# Patient Record
Sex: FEMALE | Race: WHITE | Employment: OTHER | URBAN - METROPOLITAN AREA
[De-identification: names, ages, dates, MRNs, and addresses within clinical notes are randomized per-mention and may not be internally consistent; named-entity substitution may affect disease eponyms.]

---

## 2017-01-23 RX ORDER — OMEPRAZOLE 20 MG/1
20 CAPSULE, DELAYED RELEASE ORAL DAILY PRN
Qty: 30 CAPSULE | Refills: 5 | Status: SHIPPED | OUTPATIENT
Start: 2017-01-23 | End: 2017-04-04 | Stop reason: SDUPTHER

## 2017-01-23 RX ORDER — LISINOPRIL 10 MG/1
10 TABLET ORAL DAILY
Qty: 30 TABLET | Refills: 5 | Status: SHIPPED | OUTPATIENT
Start: 2017-01-23 | End: 2017-04-04 | Stop reason: SDUPTHER

## 2017-01-23 RX ORDER — SERTRALINE HYDROCHLORIDE 100 MG/1
100 TABLET, FILM COATED ORAL DAILY
Qty: 30 TABLET | Refills: 5 | Status: SHIPPED | OUTPATIENT
Start: 2017-01-23 | End: 2017-04-04 | Stop reason: SDUPTHER

## 2017-01-23 RX ORDER — TIZANIDINE 4 MG/1
4 TABLET ORAL NIGHTLY PRN
Qty: 30 TABLET | Refills: 0 | Status: SHIPPED | OUTPATIENT
Start: 2017-01-23 | End: 2017-02-08 | Stop reason: SDUPTHER

## 2017-01-24 DIAGNOSIS — E78.2 MIXED HYPERLIPIDEMIA: Chronic | ICD-10-CM

## 2017-01-24 DIAGNOSIS — I10 ESSENTIAL HYPERTENSION: Chronic | ICD-10-CM

## 2017-01-24 DIAGNOSIS — I25.10 CORONARY ARTERY DISEASE INVOLVING NATIVE HEART WITHOUT ANGINA PECTORIS, UNSPECIFIED VESSEL OR LESION TYPE: Primary | Chronic | ICD-10-CM

## 2017-01-24 RX ORDER — CARVEDILOL 12.5 MG/1
12.5 TABLET ORAL 2 TIMES DAILY
Qty: 180 TABLET | Refills: 3 | Status: SHIPPED | OUTPATIENT
Start: 2017-01-24 | End: 2017-04-04 | Stop reason: SDUPTHER

## 2017-01-26 ENCOUNTER — TELEPHONE (OUTPATIENT)
Dept: INTERNAL MEDICINE | Age: 57
End: 2017-01-26

## 2017-01-26 DIAGNOSIS — R30.0 DYSURIA: Primary | ICD-10-CM

## 2017-01-27 ENCOUNTER — OFFICE VISIT (OUTPATIENT)
Dept: INTERNAL MEDICINE | Age: 57
End: 2017-01-27

## 2017-01-27 VITALS
DIASTOLIC BLOOD PRESSURE: 74 MMHG | WEIGHT: 259 LBS | HEART RATE: 68 BPM | BODY MASS INDEX: 40.65 KG/M2 | SYSTOLIC BLOOD PRESSURE: 120 MMHG | TEMPERATURE: 98.7 F | HEIGHT: 67 IN

## 2017-01-27 DIAGNOSIS — R63.5 WEIGHT GAIN, ABNORMAL: Chronic | ICD-10-CM

## 2017-01-27 PROCEDURE — 3014F SCREEN MAMMO DOC REV: CPT | Performed by: INTERNAL MEDICINE

## 2017-01-27 PROCEDURE — G8484 FLU IMMUNIZE NO ADMIN: HCPCS | Performed by: INTERNAL MEDICINE

## 2017-01-27 PROCEDURE — 3045F PR MOST RECENT HEMOGLOBIN A1C LEVEL 7.0-9.0%: CPT | Performed by: INTERNAL MEDICINE

## 2017-01-27 PROCEDURE — 99214 OFFICE O/P EST MOD 30 MIN: CPT | Performed by: INTERNAL MEDICINE

## 2017-01-27 PROCEDURE — 3017F COLORECTAL CA SCREEN DOC REV: CPT | Performed by: INTERNAL MEDICINE

## 2017-01-27 PROCEDURE — 1036F TOBACCO NON-USER: CPT | Performed by: INTERNAL MEDICINE

## 2017-01-27 PROCEDURE — G8598 ASA/ANTIPLAT THER USED: HCPCS | Performed by: INTERNAL MEDICINE

## 2017-01-27 PROCEDURE — G8419 CALC BMI OUT NRM PARAM NOF/U: HCPCS | Performed by: INTERNAL MEDICINE

## 2017-01-27 PROCEDURE — G8427 DOCREV CUR MEDS BY ELIG CLIN: HCPCS | Performed by: INTERNAL MEDICINE

## 2017-01-27 RX ORDER — TRAZODONE HYDROCHLORIDE 50 MG/1
TABLET ORAL
Qty: 60 TABLET | Refills: 3 | Status: SHIPPED | OUTPATIENT
Start: 2017-01-27 | End: 2017-02-10 | Stop reason: SDUPTHER

## 2017-01-27 RX ORDER — NITROFURANTOIN 25; 75 MG/1; MG/1
100 CAPSULE ORAL 2 TIMES DAILY
Qty: 10 CAPSULE | Refills: 0 | Status: SHIPPED | OUTPATIENT
Start: 2017-01-27 | End: 2017-02-01

## 2017-01-27 RX ORDER — NYSTATIN 100000 [USP'U]/G
POWDER TOPICAL
Qty: 15 G | Refills: 5 | Status: SHIPPED | OUTPATIENT
Start: 2017-01-27 | End: 2017-04-04 | Stop reason: SDUPTHER

## 2017-01-27 ASSESSMENT — ENCOUNTER SYMPTOMS
VISUAL CHANGE: 0
BLOOD IN STOOL: 0
EYE PAIN: 0
TROUBLE SWALLOWING: 0
RESPIRATORY NEGATIVE: 1
EYE DISCHARGE: 0
SORE THROAT: 0
ABDOMINAL PAIN: 0

## 2017-01-30 LAB
BUN BLDV-MCNC: 33 MG/DL
CALCIUM SERPL-MCNC: 10.2 MG/DL
CHLORIDE BLD-SCNC: 101 MMOL/L
CHOLESTEROL, TOTAL: 177 MG/DL
CHOLESTEROL/HDL RATIO: 4.32
CO2: 24 MMOL/L
CREAT SERPL-MCNC: 1.82 MG/DL
GFR CALCULATED: NORMAL
GLUCOSE BLD-MCNC: 183 MG/DL
HBA1C MFR BLD: 6.6 %
HDLC SERPL-MCNC: 41 MG/DL (ref 35–70)
LDL CHOLESTEROL CALCULATED: 93 MG/DL (ref 0–160)
POTASSIUM SERPL-SCNC: 5.1 MMOL/L
SODIUM BLD-SCNC: 139 MMOL/L
TRIGL SERPL-MCNC: 216 MG/DL
VLDLC SERPL CALC-MCNC: 43 MG/DL

## 2017-02-03 DIAGNOSIS — B96.1 KLEBSIELLA CYSTITIS: Primary | ICD-10-CM

## 2017-02-03 DIAGNOSIS — N30.90 KLEBSIELLA CYSTITIS: Primary | ICD-10-CM

## 2017-02-03 RX ORDER — CIPROFLOXACIN 500 MG/1
500 TABLET, FILM COATED ORAL 2 TIMES DAILY
Qty: 10 TABLET | Refills: 0 | Status: SHIPPED | OUTPATIENT
Start: 2017-02-03 | End: 2017-02-08

## 2017-02-08 RX ORDER — ATORVASTATIN CALCIUM 40 MG/1
40 TABLET, FILM COATED ORAL NIGHTLY
Qty: 30 TABLET | Refills: 3 | Status: SHIPPED | OUTPATIENT
Start: 2017-02-08 | End: 2017-04-04 | Stop reason: SDUPTHER

## 2017-02-08 RX ORDER — TIZANIDINE 4 MG/1
4 TABLET ORAL NIGHTLY PRN
Qty: 30 TABLET | Refills: 1 | Status: SHIPPED | OUTPATIENT
Start: 2017-02-08 | End: 2017-03-30

## 2017-02-10 ENCOUNTER — TELEPHONE (OUTPATIENT)
Dept: INTERNAL MEDICINE | Age: 57
End: 2017-02-10

## 2017-02-10 RX ORDER — TRAZODONE HYDROCHLORIDE 50 MG/1
TABLET ORAL
Qty: 60 TABLET | Refills: 3
Start: 2017-02-10 | End: 2017-04-04 | Stop reason: SDUPTHER

## 2017-02-24 ENCOUNTER — OFFICE VISIT (OUTPATIENT)
Dept: INTERNAL MEDICINE | Age: 57
End: 2017-02-24

## 2017-02-24 VITALS
HEART RATE: 69 BPM | SYSTOLIC BLOOD PRESSURE: 120 MMHG | DIASTOLIC BLOOD PRESSURE: 70 MMHG | TEMPERATURE: 97.3 F | HEIGHT: 67 IN | WEIGHT: 260 LBS | BODY MASS INDEX: 40.81 KG/M2

## 2017-02-24 DIAGNOSIS — E11.40 CONTROLLED TYPE 2 DIABETES MELLITUS WITH DIABETIC NEUROPATHY, WITH LONG-TERM CURRENT USE OF INSULIN (HCC): Primary | Chronic | ICD-10-CM

## 2017-02-24 DIAGNOSIS — Z79.4 CONTROLLED TYPE 2 DIABETES MELLITUS WITH DIABETIC NEUROPATHY, WITH LONG-TERM CURRENT USE OF INSULIN (HCC): Primary | Chronic | ICD-10-CM

## 2017-02-24 DIAGNOSIS — R29.898 WEAKNESS OF BOTH LEGS: ICD-10-CM

## 2017-02-24 PROCEDURE — 3044F HG A1C LEVEL LT 7.0%: CPT | Performed by: INTERNAL MEDICINE

## 2017-02-24 PROCEDURE — G8427 DOCREV CUR MEDS BY ELIG CLIN: HCPCS | Performed by: INTERNAL MEDICINE

## 2017-02-24 PROCEDURE — 1036F TOBACCO NON-USER: CPT | Performed by: INTERNAL MEDICINE

## 2017-02-24 PROCEDURE — G8598 ASA/ANTIPLAT THER USED: HCPCS | Performed by: INTERNAL MEDICINE

## 2017-02-24 PROCEDURE — G8417 CALC BMI ABV UP PARAM F/U: HCPCS | Performed by: INTERNAL MEDICINE

## 2017-02-24 PROCEDURE — 3017F COLORECTAL CA SCREEN DOC REV: CPT | Performed by: INTERNAL MEDICINE

## 2017-02-24 PROCEDURE — 3014F SCREEN MAMMO DOC REV: CPT | Performed by: INTERNAL MEDICINE

## 2017-02-24 PROCEDURE — 99213 OFFICE O/P EST LOW 20 MIN: CPT | Performed by: INTERNAL MEDICINE

## 2017-02-24 PROCEDURE — G8484 FLU IMMUNIZE NO ADMIN: HCPCS | Performed by: INTERNAL MEDICINE

## 2017-02-24 RX ORDER — GABAPENTIN 300 MG/1
300 CAPSULE ORAL 2 TIMES DAILY
Qty: 60 CAPSULE | Refills: 3 | Status: SHIPPED | OUTPATIENT
Start: 2017-02-24 | End: 2017-04-04 | Stop reason: SDUPTHER

## 2017-02-24 ASSESSMENT — ENCOUNTER SYMPTOMS
COUGH: 0
TROUBLE SWALLOWING: 0
CHANGE IN BOWEL HABIT: 0
EYE DISCHARGE: 0
GASTROINTESTINAL NEGATIVE: 1
EYE PAIN: 0
BLOOD IN STOOL: 0
ABDOMINAL PAIN: 0
RESPIRATORY NEGATIVE: 1
VISUAL CHANGE: 0

## 2017-03-06 RX ORDER — CLOPIDOGREL BISULFATE 75 MG/1
TABLET ORAL
Qty: 30 TABLET | Refills: 3 | Status: SHIPPED | OUTPATIENT
Start: 2017-03-06 | End: 2017-04-04 | Stop reason: SDUPTHER

## 2017-03-21 ENCOUNTER — TELEPHONE (OUTPATIENT)
Dept: INTERNAL MEDICINE | Age: 57
End: 2017-03-21

## 2017-03-30 ENCOUNTER — OFFICE VISIT (OUTPATIENT)
Dept: INTERNAL MEDICINE | Age: 57
End: 2017-03-30

## 2017-03-30 VITALS
HEIGHT: 67 IN | WEIGHT: 255 LBS | DIASTOLIC BLOOD PRESSURE: 90 MMHG | OXYGEN SATURATION: 97 % | TEMPERATURE: 98.2 F | BODY MASS INDEX: 40.02 KG/M2 | HEART RATE: 75 BPM | SYSTOLIC BLOOD PRESSURE: 160 MMHG

## 2017-03-30 DIAGNOSIS — Z78.9 IMPAIRED MOBILITY AND ACTIVITIES OF DAILY LIVING: Primary | Chronic | ICD-10-CM

## 2017-03-30 DIAGNOSIS — I10 ESSENTIAL HYPERTENSION: Chronic | ICD-10-CM

## 2017-03-30 DIAGNOSIS — I67.89 CEREBRAL MICROVASCULAR DISEASE: Chronic | ICD-10-CM

## 2017-03-30 DIAGNOSIS — Z74.09 IMPAIRED MOBILITY AND ACTIVITIES OF DAILY LIVING: Primary | Chronic | ICD-10-CM

## 2017-03-30 DIAGNOSIS — E66.09 NON MORBID OBESITY DUE TO EXCESS CALORIES: Chronic | ICD-10-CM

## 2017-03-30 DIAGNOSIS — Z96.651 STATUS POST TOTAL KNEE REPLACEMENT, RIGHT: ICD-10-CM

## 2017-03-30 PROCEDURE — G8428 CUR MEDS NOT DOCUMENT: HCPCS | Performed by: INTERNAL MEDICINE

## 2017-03-30 PROCEDURE — G8598 ASA/ANTIPLAT THER USED: HCPCS | Performed by: INTERNAL MEDICINE

## 2017-03-30 PROCEDURE — G8484 FLU IMMUNIZE NO ADMIN: HCPCS | Performed by: INTERNAL MEDICINE

## 2017-03-30 PROCEDURE — G8417 CALC BMI ABV UP PARAM F/U: HCPCS | Performed by: INTERNAL MEDICINE

## 2017-03-30 PROCEDURE — 99213 OFFICE O/P EST LOW 20 MIN: CPT | Performed by: INTERNAL MEDICINE

## 2017-03-30 PROCEDURE — 3014F SCREEN MAMMO DOC REV: CPT | Performed by: INTERNAL MEDICINE

## 2017-03-30 PROCEDURE — 3017F COLORECTAL CA SCREEN DOC REV: CPT | Performed by: INTERNAL MEDICINE

## 2017-03-30 PROCEDURE — 1036F TOBACCO NON-USER: CPT | Performed by: INTERNAL MEDICINE

## 2017-03-30 RX ORDER — LISINOPRIL 10 MG/1
10 TABLET ORAL
COMMUNITY
Start: 2014-04-15 | End: 2017-03-30

## 2017-03-30 RX ORDER — OMEPRAZOLE 20 MG/1
CAPSULE, DELAYED RELEASE ORAL
COMMUNITY
Start: 2017-01-17 | End: 2017-03-30

## 2017-03-30 RX ORDER — ATORVASTATIN CALCIUM 40 MG/1
TABLET, FILM COATED ORAL
COMMUNITY
Start: 2017-02-23 | End: 2017-03-30

## 2017-03-30 RX ORDER — ARIPIPRAZOLE 10 MG/1
TABLET ORAL
COMMUNITY
Start: 2017-02-08 | End: 2017-03-30

## 2017-03-30 RX ORDER — AMLODIPINE BESYLATE 5 MG/1
TABLET ORAL
COMMUNITY
Start: 2017-03-03 | End: 2017-04-04 | Stop reason: SDUPTHER

## 2017-03-30 RX ORDER — ARIPIPRAZOLE 10 MG/1
TABLET ORAL
COMMUNITY
Start: 2017-02-08 | End: 2017-04-03 | Stop reason: SDUPTHER

## 2017-03-30 RX ORDER — NITROFURANTOIN 25; 75 MG/1; MG/1
CAPSULE ORAL
COMMUNITY
Start: 2017-02-01 | End: 2017-04-04

## 2017-04-03 ENCOUNTER — TELEPHONE (OUTPATIENT)
Dept: INTERNAL MEDICINE | Age: 57
End: 2017-04-03

## 2017-04-03 RX ORDER — ARIPIPRAZOLE 10 MG/1
10 TABLET ORAL DAILY
Qty: 30 TABLET | Refills: 3 | Status: SHIPPED | OUTPATIENT
Start: 2017-04-03 | End: 2017-04-04 | Stop reason: SDUPTHER

## 2017-04-04 DIAGNOSIS — I10 ESSENTIAL HYPERTENSION: Chronic | ICD-10-CM

## 2017-04-04 DIAGNOSIS — E78.2 MIXED HYPERLIPIDEMIA: Chronic | ICD-10-CM

## 2017-04-04 DIAGNOSIS — I25.10 CORONARY ARTERY DISEASE INVOLVING NATIVE HEART WITHOUT ANGINA PECTORIS, UNSPECIFIED VESSEL OR LESION TYPE: Chronic | ICD-10-CM

## 2017-04-04 RX ORDER — ATORVASTATIN CALCIUM 40 MG/1
40 TABLET, FILM COATED ORAL NIGHTLY
Qty: 90 TABLET | Refills: 1 | Status: SHIPPED | OUTPATIENT
Start: 2017-04-04 | End: 2017-08-31 | Stop reason: SDUPTHER

## 2017-04-04 RX ORDER — FAMOTIDINE 20 MG
1 TABLET ORAL DAILY
Qty: 90 CAPSULE | Refills: 1 | Status: SHIPPED | OUTPATIENT
Start: 2017-04-04 | End: 2017-11-06 | Stop reason: SDUPTHER

## 2017-04-04 RX ORDER — CLOPIDOGREL BISULFATE 75 MG/1
TABLET ORAL
Qty: 90 TABLET | Refills: 1 | Status: SHIPPED | OUTPATIENT
Start: 2017-04-04 | End: 2017-04-06 | Stop reason: SDUPTHER

## 2017-04-04 RX ORDER — GABAPENTIN 300 MG/1
300 CAPSULE ORAL 2 TIMES DAILY
Qty: 180 CAPSULE | Refills: 1 | Status: SHIPPED | OUTPATIENT
Start: 2017-04-04 | End: 2017-08-31 | Stop reason: SDUPTHER

## 2017-04-04 RX ORDER — DOCUSATE SODIUM 100 MG/1
100 CAPSULE, LIQUID FILLED ORAL 2 TIMES DAILY
Qty: 180 CAPSULE | Refills: 1 | Status: SHIPPED | OUTPATIENT
Start: 2017-04-04 | End: 2017-08-31 | Stop reason: SDUPTHER

## 2017-04-04 RX ORDER — INSULIN GLARGINE 100 [IU]/ML
INJECTION, SOLUTION SUBCUTANEOUS
Qty: 3 VIAL | Refills: 3 | Status: SHIPPED | OUTPATIENT
Start: 2017-04-04 | End: 2017-04-07

## 2017-04-04 RX ORDER — ACETAMINOPHEN 500 MG
500 TABLET ORAL EVERY 6 HOURS PRN
Qty: 120 TABLET | Refills: 1 | Status: SHIPPED | OUTPATIENT
Start: 2017-04-04 | End: 2017-05-15 | Stop reason: SDUPTHER

## 2017-04-04 RX ORDER — TRAZODONE HYDROCHLORIDE 50 MG/1
TABLET ORAL
Qty: 180 TABLET | Refills: 1 | Status: SHIPPED | OUTPATIENT
Start: 2017-04-04 | End: 2017-08-31 | Stop reason: SDUPTHER

## 2017-04-04 RX ORDER — NYSTATIN 100000 [USP'U]/G
POWDER TOPICAL
Qty: 15 G | Refills: 5 | Status: SHIPPED | OUTPATIENT
Start: 2017-04-04 | End: 2017-05-24

## 2017-04-04 RX ORDER — SERTRALINE HYDROCHLORIDE 100 MG/1
100 TABLET, FILM COATED ORAL DAILY
Qty: 90 TABLET | Refills: 1 | Status: SHIPPED | OUTPATIENT
Start: 2017-04-04 | End: 2017-04-10 | Stop reason: SDUPTHER

## 2017-04-04 RX ORDER — OXYBUTYNIN CHLORIDE 10 MG/1
TABLET, EXTENDED RELEASE ORAL
Qty: 180 TABLET | Refills: 3 | Status: SHIPPED | OUTPATIENT
Start: 2017-04-04 | End: 2018-02-27 | Stop reason: SDUPTHER

## 2017-04-04 RX ORDER — ARIPIPRAZOLE 10 MG/1
10 TABLET ORAL DAILY
Qty: 90 TABLET | Refills: 1 | Status: SHIPPED | OUTPATIENT
Start: 2017-04-04 | End: 2017-04-06 | Stop reason: SDUPTHER

## 2017-04-04 RX ORDER — OMEPRAZOLE 20 MG/1
20 CAPSULE, DELAYED RELEASE ORAL DAILY PRN
Qty: 90 CAPSULE | Refills: 1 | Status: SHIPPED | OUTPATIENT
Start: 2017-04-04 | End: 2017-08-31 | Stop reason: SDUPTHER

## 2017-04-04 RX ORDER — LANOLIN ALCOHOL/MO/W.PET/CERES
1000 CREAM (GRAM) TOPICAL DAILY
Qty: 90 TABLET | Refills: 1 | Status: SHIPPED | OUTPATIENT
Start: 2017-04-04 | End: 2017-10-16

## 2017-04-04 RX ORDER — AMLODIPINE BESYLATE 5 MG/1
5 TABLET ORAL DAILY
Qty: 90 TABLET | Refills: 1 | Status: SHIPPED | OUTPATIENT
Start: 2017-04-04 | End: 2017-09-29

## 2017-04-04 RX ORDER — NITROGLYCERIN 0.4 MG/1
0.4 TABLET SUBLINGUAL EVERY 5 MIN PRN
Qty: 75 TABLET | Refills: 0 | Status: SHIPPED | OUTPATIENT
Start: 2017-04-04 | End: 2017-06-15 | Stop reason: SDUPTHER

## 2017-04-04 RX ORDER — CARVEDILOL 12.5 MG/1
12.5 TABLET ORAL 2 TIMES DAILY
Qty: 180 TABLET | Refills: 1 | Status: SHIPPED | OUTPATIENT
Start: 2017-04-04 | End: 2017-04-06 | Stop reason: SDUPTHER

## 2017-04-04 RX ORDER — LISINOPRIL 10 MG/1
10 TABLET ORAL DAILY
Qty: 90 TABLET | Refills: 1 | Status: SHIPPED | OUTPATIENT
Start: 2017-04-04 | End: 2017-08-31 | Stop reason: SDUPTHER

## 2017-04-06 DIAGNOSIS — I25.10 CORONARY ARTERY DISEASE INVOLVING NATIVE HEART WITHOUT ANGINA PECTORIS, UNSPECIFIED VESSEL OR LESION TYPE: Chronic | ICD-10-CM

## 2017-04-06 DIAGNOSIS — E78.2 MIXED HYPERLIPIDEMIA: Chronic | ICD-10-CM

## 2017-04-06 DIAGNOSIS — I10 ESSENTIAL HYPERTENSION: Chronic | ICD-10-CM

## 2017-04-06 RX ORDER — CLOPIDOGREL BISULFATE 75 MG/1
TABLET ORAL
Qty: 30 TABLET | Refills: 1 | Status: SHIPPED | OUTPATIENT
Start: 2017-04-06 | End: 2017-11-06

## 2017-04-06 RX ORDER — ARIPIPRAZOLE 10 MG/1
10 TABLET ORAL DAILY
Qty: 30 TABLET | Refills: 1 | Status: SHIPPED | OUTPATIENT
Start: 2017-04-06 | End: 2017-08-01 | Stop reason: SDUPTHER

## 2017-04-06 RX ORDER — CARVEDILOL 12.5 MG/1
12.5 TABLET ORAL 2 TIMES DAILY
Qty: 60 TABLET | Refills: 1 | Status: SHIPPED | OUTPATIENT
Start: 2017-04-06 | End: 2017-11-06

## 2017-04-07 RX ORDER — LANCETS 28 GAUGE
EACH MISCELLANEOUS
Refills: 0 | COMMUNITY
Start: 2017-04-06 | End: 2018-04-10 | Stop reason: SDUPTHER

## 2017-04-07 RX ORDER — INSULIN GLARGINE 100 [IU]/ML
INJECTION, SOLUTION SUBCUTANEOUS
Qty: 3 VIAL | Refills: 0 | OUTPATIENT
Start: 2017-04-07

## 2017-04-07 RX ORDER — BLOOD-GLUCOSE METER
KIT MISCELLANEOUS
Refills: 0 | COMMUNITY
Start: 2017-04-06 | End: 2017-08-11 | Stop reason: SDUPTHER

## 2017-04-07 RX ORDER — PEN NEEDLE, DIABETIC 31 GX5/16"
100 NEEDLE, DISPOSABLE MISCELLANEOUS 3 TIMES DAILY
Qty: 200 EACH | Refills: 3 | Status: SHIPPED | OUTPATIENT
Start: 2017-04-07 | End: 2017-07-03 | Stop reason: SDUPTHER

## 2017-04-07 RX ORDER — BLOOD-GLUCOSE METER
KIT MISCELLANEOUS
Refills: 0 | COMMUNITY
Start: 2017-04-06 | End: 2019-04-02 | Stop reason: SDUPTHER

## 2017-04-10 RX ORDER — SERTRALINE HYDROCHLORIDE 100 MG/1
100 TABLET, FILM COATED ORAL DAILY
Qty: 90 TABLET | Refills: 1 | Status: ON HOLD | OUTPATIENT
Start: 2017-04-10 | End: 2017-10-13 | Stop reason: HOSPADM

## 2017-04-24 ENCOUNTER — CARE COORDINATION (OUTPATIENT)
Dept: CARE COORDINATION | Age: 57
End: 2017-04-24

## 2017-04-26 ENCOUNTER — CARE COORDINATION (OUTPATIENT)
Dept: CARE COORDINATION | Age: 57
End: 2017-04-26

## 2017-04-27 ENCOUNTER — CARE COORDINATION (OUTPATIENT)
Dept: CARE COORDINATION | Age: 57
End: 2017-04-27

## 2017-05-15 RX ORDER — ASPIRIN 81 MG
TABLET, DELAYED RELEASE (ENTERIC COATED) ORAL
Refills: 1 | COMMUNITY
Start: 2017-04-20 | End: 2017-05-15

## 2017-05-15 RX ORDER — ACETAMINOPHEN 500 MG
500 TABLET ORAL EVERY 6 HOURS PRN
Qty: 120 TABLET | Refills: 1 | Status: SHIPPED | OUTPATIENT
Start: 2017-05-15 | End: 2017-07-05 | Stop reason: SDUPTHER

## 2017-05-16 ENCOUNTER — TELEPHONE (OUTPATIENT)
Dept: INTERNAL MEDICINE | Age: 57
End: 2017-05-16

## 2017-05-18 ENCOUNTER — CARE COORDINATION (OUTPATIENT)
Dept: CARE COORDINATION | Age: 57
End: 2017-05-18

## 2017-05-23 ENCOUNTER — CARE COORDINATION (OUTPATIENT)
Dept: CARE COORDINATION | Age: 57
End: 2017-05-23

## 2017-05-24 ENCOUNTER — OFFICE VISIT (OUTPATIENT)
Dept: INTERNAL MEDICINE | Age: 57
End: 2017-05-24

## 2017-05-24 VITALS
HEART RATE: 72 BPM | HEIGHT: 67 IN | BODY MASS INDEX: 36.95 KG/M2 | TEMPERATURE: 98.1 F | OXYGEN SATURATION: 96 % | SYSTOLIC BLOOD PRESSURE: 142 MMHG | WEIGHT: 235.4 LBS | DIASTOLIC BLOOD PRESSURE: 98 MMHG

## 2017-05-24 DIAGNOSIS — E16.2 HYPOGLYCEMIA: ICD-10-CM

## 2017-05-24 DIAGNOSIS — B37.2 CANDIDIASIS OF SKIN: Primary | Chronic | ICD-10-CM

## 2017-05-24 PROCEDURE — 3017F COLORECTAL CA SCREEN DOC REV: CPT | Performed by: INTERNAL MEDICINE

## 2017-05-24 PROCEDURE — 1036F TOBACCO NON-USER: CPT | Performed by: INTERNAL MEDICINE

## 2017-05-24 PROCEDURE — G8427 DOCREV CUR MEDS BY ELIG CLIN: HCPCS | Performed by: INTERNAL MEDICINE

## 2017-05-24 PROCEDURE — G8417 CALC BMI ABV UP PARAM F/U: HCPCS | Performed by: INTERNAL MEDICINE

## 2017-05-24 PROCEDURE — G8598 ASA/ANTIPLAT THER USED: HCPCS | Performed by: INTERNAL MEDICINE

## 2017-05-24 PROCEDURE — 99213 OFFICE O/P EST LOW 20 MIN: CPT | Performed by: INTERNAL MEDICINE

## 2017-05-24 PROCEDURE — 3014F SCREEN MAMMO DOC REV: CPT | Performed by: INTERNAL MEDICINE

## 2017-05-24 RX ORDER — NYSTATIN 100000 U/G
CREAM TOPICAL
Qty: 30 G | Refills: 3 | Status: SHIPPED | OUTPATIENT
Start: 2017-05-24 | End: 2017-07-03

## 2017-05-28 ASSESSMENT — ENCOUNTER SYMPTOMS
ABDOMINAL PAIN: 0
GASTROINTESTINAL NEGATIVE: 1
COUGH: 0
SORE THROAT: 0
EYE PAIN: 0
BLOOD IN STOOL: 0
TROUBLE SWALLOWING: 0
EYE DISCHARGE: 0
RESPIRATORY NEGATIVE: 1
NAIL CHANGES: 0
DIARRHEA: 0

## 2017-06-05 ENCOUNTER — CARE COORDINATION (OUTPATIENT)
Dept: CARE COORDINATION | Age: 57
End: 2017-06-05

## 2017-06-08 ENCOUNTER — OFFICE VISIT (OUTPATIENT)
Dept: CARDIOLOGY | Age: 57
End: 2017-06-08

## 2017-06-08 VITALS
HEIGHT: 67 IN | BODY MASS INDEX: 37.04 KG/M2 | TEMPERATURE: 97.2 F | HEART RATE: 67 BPM | SYSTOLIC BLOOD PRESSURE: 99 MMHG | DIASTOLIC BLOOD PRESSURE: 59 MMHG | WEIGHT: 236 LBS

## 2017-06-08 DIAGNOSIS — Z89.429 HISTORY OF AMPUTATION OF LESSER TOE, UNSPECIFIED LATERALITY (HCC): ICD-10-CM

## 2017-06-08 DIAGNOSIS — E78.2 MIXED HYPERLIPIDEMIA: Chronic | ICD-10-CM

## 2017-06-08 DIAGNOSIS — D50.9 IRON DEFICIENCY ANEMIA, UNSPECIFIED IRON DEFICIENCY ANEMIA TYPE: ICD-10-CM

## 2017-06-08 DIAGNOSIS — I10 ESSENTIAL HYPERTENSION: Primary | Chronic | ICD-10-CM

## 2017-06-08 DIAGNOSIS — E11.69 TYPE 2 DIABETES MELLITUS WITH OTHER SPECIFIED COMPLICATION, UNSPECIFIED LONG TERM INSULIN USE STATUS: ICD-10-CM

## 2017-06-08 DIAGNOSIS — R06.02 SHORTNESS OF BREATH: ICD-10-CM

## 2017-06-08 DIAGNOSIS — I25.10 CORONARY ARTERY DISEASE INVOLVING NATIVE HEART WITHOUT ANGINA PECTORIS, UNSPECIFIED VESSEL OR LESION TYPE: Chronic | ICD-10-CM

## 2017-06-08 PROCEDURE — 99214 OFFICE O/P EST MOD 30 MIN: CPT | Performed by: INTERNAL MEDICINE

## 2017-06-08 PROCEDURE — 1036F TOBACCO NON-USER: CPT | Performed by: INTERNAL MEDICINE

## 2017-06-08 PROCEDURE — 3017F COLORECTAL CA SCREEN DOC REV: CPT | Performed by: INTERNAL MEDICINE

## 2017-06-08 PROCEDURE — G8427 DOCREV CUR MEDS BY ELIG CLIN: HCPCS | Performed by: INTERNAL MEDICINE

## 2017-06-08 PROCEDURE — 3046F HEMOGLOBIN A1C LEVEL >9.0%: CPT | Performed by: INTERNAL MEDICINE

## 2017-06-08 PROCEDURE — G8598 ASA/ANTIPLAT THER USED: HCPCS | Performed by: INTERNAL MEDICINE

## 2017-06-08 PROCEDURE — G8417 CALC BMI ABV UP PARAM F/U: HCPCS | Performed by: INTERNAL MEDICINE

## 2017-06-08 PROCEDURE — 3014F SCREEN MAMMO DOC REV: CPT | Performed by: INTERNAL MEDICINE

## 2017-06-08 PROCEDURE — 93000 ELECTROCARDIOGRAM COMPLETE: CPT | Performed by: INTERNAL MEDICINE

## 2017-06-09 ENCOUNTER — TELEPHONE (OUTPATIENT)
Dept: INTERNAL MEDICINE | Age: 57
End: 2017-06-09

## 2017-06-15 RX ORDER — NITROGLYCERIN 0.4 MG/1
0.4 TABLET SUBLINGUAL EVERY 5 MIN PRN
Qty: 75 TABLET | Refills: 0 | Status: SHIPPED | OUTPATIENT
Start: 2017-06-15 | End: 2017-08-31 | Stop reason: SDUPTHER

## 2017-07-03 RX ORDER — NYSTATIN 100000 [USP'U]/G
POWDER TOPICAL
Refills: 5 | COMMUNITY
Start: 2017-06-21 | End: 2017-08-31 | Stop reason: SDUPTHER

## 2017-07-03 RX ORDER — DULAGLUTIDE 0.75 MG/.5ML
INJECTION, SOLUTION SUBCUTANEOUS
Qty: 2 ML | Refills: 10 | Status: SHIPPED | OUTPATIENT
Start: 2017-07-03 | End: 2018-05-14

## 2017-07-03 RX ORDER — ISOPROPYL ALCOHOL 70 ML/100ML
SWAB TOPICAL
Qty: 100 EACH | Refills: 3 | Status: SHIPPED | OUTPATIENT
Start: 2017-07-03 | End: 2019-12-27

## 2017-07-05 RX ORDER — INSULIN GLARGINE 100 [IU]/ML
INJECTION, SOLUTION SUBCUTANEOUS
Qty: 15 ML | Refills: 10 | Status: SHIPPED | OUTPATIENT
Start: 2017-07-05 | End: 2018-01-09 | Stop reason: SDUPTHER

## 2017-07-05 RX ORDER — ACETAMINOPHEN 500 MG
TABLET ORAL
Qty: 120 TABLET | Refills: 5 | Status: SHIPPED | OUTPATIENT
Start: 2017-07-05 | End: 2017-12-27 | Stop reason: SDUPTHER

## 2017-07-13 ENCOUNTER — CARE COORDINATION (OUTPATIENT)
Dept: CARE COORDINATION | Age: 57
End: 2017-07-13

## 2017-07-21 ENCOUNTER — OFFICE VISIT (OUTPATIENT)
Dept: INTERNAL MEDICINE | Age: 57
End: 2017-07-21

## 2017-07-21 VITALS
BODY MASS INDEX: 36.26 KG/M2 | WEIGHT: 231 LBS | HEIGHT: 67 IN | TEMPERATURE: 98.6 F | OXYGEN SATURATION: 96 % | SYSTOLIC BLOOD PRESSURE: 100 MMHG | HEART RATE: 81 BPM | DIASTOLIC BLOOD PRESSURE: 60 MMHG

## 2017-07-21 DIAGNOSIS — E11.40 CONTROLLED TYPE 2 DIABETES MELLITUS WITH DIABETIC NEUROPATHY, WITH LONG-TERM CURRENT USE OF INSULIN (HCC): Primary | ICD-10-CM

## 2017-07-21 DIAGNOSIS — Z79.4 CONTROLLED TYPE 2 DIABETES MELLITUS WITH DIABETIC NEUROPATHY, WITH LONG-TERM CURRENT USE OF INSULIN (HCC): Primary | ICD-10-CM

## 2017-07-21 DIAGNOSIS — Z12.31 VISIT FOR SCREENING MAMMOGRAM: ICD-10-CM

## 2017-07-21 DIAGNOSIS — B37.31 CANDIDIASIS, VULVA: ICD-10-CM

## 2017-07-21 DIAGNOSIS — F20.0 SCHIZOPHRENIA, PARANOID, CHRONIC (HCC): ICD-10-CM

## 2017-07-21 PROCEDURE — 3046F HEMOGLOBIN A1C LEVEL >9.0%: CPT | Performed by: INTERNAL MEDICINE

## 2017-07-21 PROCEDURE — 99213 OFFICE O/P EST LOW 20 MIN: CPT | Performed by: INTERNAL MEDICINE

## 2017-07-21 PROCEDURE — G8598 ASA/ANTIPLAT THER USED: HCPCS | Performed by: INTERNAL MEDICINE

## 2017-07-21 PROCEDURE — 3017F COLORECTAL CA SCREEN DOC REV: CPT | Performed by: INTERNAL MEDICINE

## 2017-07-21 PROCEDURE — 1036F TOBACCO NON-USER: CPT | Performed by: INTERNAL MEDICINE

## 2017-07-21 PROCEDURE — 3014F SCREEN MAMMO DOC REV: CPT | Performed by: INTERNAL MEDICINE

## 2017-07-21 PROCEDURE — G8417 CALC BMI ABV UP PARAM F/U: HCPCS | Performed by: INTERNAL MEDICINE

## 2017-07-21 PROCEDURE — G8428 CUR MEDS NOT DOCUMENT: HCPCS | Performed by: INTERNAL MEDICINE

## 2017-07-21 RX ORDER — FLUCONAZOLE 100 MG/1
100 TABLET ORAL DAILY
Qty: 3 TABLET | Refills: 1 | Status: SHIPPED | OUTPATIENT
Start: 2017-07-21 | End: 2017-08-11 | Stop reason: SDUPTHER

## 2017-07-21 ASSESSMENT — ENCOUNTER SYMPTOMS
BLOOD IN STOOL: 0
EYE PAIN: 0
TROUBLE SWALLOWING: 0
GASTROINTESTINAL NEGATIVE: 1
EYE DISCHARGE: 0
DIARRHEA: 0
COUGH: 0
ABDOMINAL PAIN: 0
RESPIRATORY NEGATIVE: 1

## 2017-07-30 ASSESSMENT — ENCOUNTER SYMPTOMS
EYE PAIN: 0
SHORTNESS OF BREATH: 1
COUGH: 0
CHOKING: 0
TROUBLE SWALLOWING: 0
BLOOD IN STOOL: 0
ABDOMINAL PAIN: 0
ORTHOPNEA: 0
BACK PAIN: 1
CONSTIPATION: 0
EYE DISCHARGE: 0

## 2017-07-31 ENCOUNTER — CARE COORDINATION (OUTPATIENT)
Dept: CARE COORDINATION | Age: 57
End: 2017-07-31

## 2017-08-01 RX ORDER — ARIPIPRAZOLE 10 MG/1
10 TABLET ORAL DAILY
Qty: 30 TABLET | Refills: 3 | Status: SHIPPED | OUTPATIENT
Start: 2017-08-01 | End: 2018-03-06

## 2017-08-01 RX ORDER — FLUCONAZOLE 100 MG/1
TABLET ORAL
Qty: 3 TABLET | Refills: 1 | OUTPATIENT
Start: 2017-08-01

## 2017-08-01 RX ORDER — ARIPIPRAZOLE 10 MG/1
TABLET ORAL
Qty: 30 TABLET | Refills: 3 | OUTPATIENT
Start: 2017-08-01

## 2017-08-01 RX ORDER — INSULIN ASPART 100 [IU]/ML
INJECTION, SOLUTION INTRAVENOUS; SUBCUTANEOUS
Qty: 15 ML | Refills: 3 | OUTPATIENT
Start: 2017-08-01

## 2017-08-11 RX ORDER — BLOOD-GLUCOSE METER
KIT MISCELLANEOUS
Qty: 100 EACH | Refills: 3 | Status: SHIPPED | OUTPATIENT
Start: 2017-08-11 | End: 2017-08-15 | Stop reason: SDUPTHER

## 2017-08-11 RX ORDER — FLUCONAZOLE 100 MG/1
100 TABLET ORAL DAILY
Qty: 3 TABLET | Refills: 0 | Status: SHIPPED | OUTPATIENT
Start: 2017-08-11 | End: 2017-08-14

## 2017-08-15 RX ORDER — BLOOD-GLUCOSE METER
KIT MISCELLANEOUS
Qty: 100 EACH | Refills: 3 | Status: SHIPPED | OUTPATIENT
Start: 2017-08-15 | End: 2018-05-11 | Stop reason: SDUPTHER

## 2017-08-18 ENCOUNTER — HOSPITAL ENCOUNTER (OUTPATIENT)
Dept: WOMENS IMAGING | Age: 57
Discharge: HOME | End: 2017-08-18
Payer: MEDICARE

## 2017-08-18 DIAGNOSIS — Z12.31 VISIT FOR SCREENING MAMMOGRAM: ICD-10-CM

## 2017-08-18 PROCEDURE — 77063 BREAST TOMOSYNTHESIS BI: CPT

## 2017-08-31 RX ORDER — NYSTATIN 100000 [USP'U]/G
POWDER TOPICAL
Qty: 15 G | Refills: 5 | Status: SHIPPED | OUTPATIENT
Start: 2017-08-31 | End: 2018-02-27 | Stop reason: SDUPTHER

## 2017-08-31 RX ORDER — LISINOPRIL 10 MG/1
TABLET ORAL
Qty: 90 TABLET | Refills: 1 | Status: ON HOLD | OUTPATIENT
Start: 2017-08-31 | End: 2017-10-13 | Stop reason: HOSPADM

## 2017-08-31 RX ORDER — ASPIRIN 81 MG
TABLET, DELAYED RELEASE (ENTERIC COATED) ORAL
Qty: 90 TABLET | Refills: 1 | Status: SHIPPED | OUTPATIENT
Start: 2017-08-31 | End: 2018-02-27 | Stop reason: SDUPTHER

## 2017-08-31 RX ORDER — OMEPRAZOLE 20 MG/1
CAPSULE, DELAYED RELEASE ORAL
Qty: 90 CAPSULE | Refills: 1 | Status: SHIPPED | OUTPATIENT
Start: 2017-08-31 | End: 2018-02-27 | Stop reason: SDUPTHER

## 2017-08-31 RX ORDER — ATORVASTATIN CALCIUM 40 MG/1
TABLET, FILM COATED ORAL
Qty: 90 TABLET | Refills: 1 | Status: SHIPPED | OUTPATIENT
Start: 2017-08-31 | End: 2018-02-27 | Stop reason: SDUPTHER

## 2017-08-31 RX ORDER — DOCUSATE SODIUM 100 MG/1
CAPSULE, LIQUID FILLED ORAL
Qty: 180 CAPSULE | Refills: 1 | Status: SHIPPED | OUTPATIENT
Start: 2017-08-31 | End: 2018-06-08

## 2017-08-31 RX ORDER — TRAZODONE HYDROCHLORIDE 50 MG/1
TABLET ORAL
Qty: 180 TABLET | Refills: 1 | Status: SHIPPED | OUTPATIENT
Start: 2017-08-31 | End: 2018-02-27 | Stop reason: SDUPTHER

## 2017-08-31 RX ORDER — GABAPENTIN 300 MG/1
CAPSULE ORAL
Qty: 180 CAPSULE | Refills: 1 | Status: SHIPPED | OUTPATIENT
Start: 2017-08-31 | End: 2018-02-27 | Stop reason: SDUPTHER

## 2017-08-31 RX ORDER — NITROGLYCERIN 0.4 MG/1
TABLET SUBLINGUAL
Qty: 75 TABLET | Refills: 0 | Status: SHIPPED | OUTPATIENT
Start: 2017-08-31 | End: 2017-11-29 | Stop reason: SDUPTHER

## 2017-09-05 ENCOUNTER — TELEPHONE (OUTPATIENT)
Dept: INTERNAL MEDICINE | Age: 57
End: 2017-09-05

## 2017-09-05 DIAGNOSIS — R29.898 WEAKNESS OF LOWER EXTREMITY, UNSPECIFIED LATERALITY: Primary | ICD-10-CM

## 2017-09-05 DIAGNOSIS — G81.94 HEMIPARESIS, LEFT (HCC): ICD-10-CM

## 2017-09-07 ENCOUNTER — CARE COORDINATION (OUTPATIENT)
Dept: CARE COORDINATION | Age: 57
End: 2017-09-07

## 2017-09-29 ENCOUNTER — OFFICE VISIT (OUTPATIENT)
Dept: INTERNAL MEDICINE | Age: 57
End: 2017-09-29

## 2017-09-29 VITALS
DIASTOLIC BLOOD PRESSURE: 62 MMHG | BODY MASS INDEX: 35.16 KG/M2 | WEIGHT: 224 LBS | OXYGEN SATURATION: 98 % | HEART RATE: 81 BPM | TEMPERATURE: 97.3 F | HEIGHT: 67 IN | SYSTOLIC BLOOD PRESSURE: 90 MMHG

## 2017-09-29 DIAGNOSIS — I69.354 HEMIPLEGIA AND HEMIPARESIS FOLLOWING CEREBRAL INFARCTION AFFECTING LEFT NON-DOMINANT SIDE (HCC): ICD-10-CM

## 2017-09-29 DIAGNOSIS — Z79.4 CONTROLLED TYPE 2 DIABETES MELLITUS WITH DIABETIC NEUROPATHY, WITH LONG-TERM CURRENT USE OF INSULIN (HCC): Primary | Chronic | ICD-10-CM

## 2017-09-29 DIAGNOSIS — E11.40 CONTROLLED TYPE 2 DIABETES MELLITUS WITH DIABETIC NEUROPATHY, WITH LONG-TERM CURRENT USE OF INSULIN (HCC): Primary | Chronic | ICD-10-CM

## 2017-09-29 DIAGNOSIS — Z23 NEED FOR INFLUENZA VACCINATION: ICD-10-CM

## 2017-09-29 DIAGNOSIS — I95.2 HYPOTENSION DUE TO DRUGS: ICD-10-CM

## 2017-09-29 DIAGNOSIS — R29.6 RECURRENT FALLS WHILE WALKING: ICD-10-CM

## 2017-09-29 PROCEDURE — G8427 DOCREV CUR MEDS BY ELIG CLIN: HCPCS | Performed by: INTERNAL MEDICINE

## 2017-09-29 PROCEDURE — 83036 HEMOGLOBIN GLYCOSYLATED A1C: CPT | Performed by: INTERNAL MEDICINE

## 2017-09-29 PROCEDURE — G0008 ADMIN INFLUENZA VIRUS VAC: HCPCS | Performed by: INTERNAL MEDICINE

## 2017-09-29 PROCEDURE — 3046F HEMOGLOBIN A1C LEVEL >9.0%: CPT | Performed by: INTERNAL MEDICINE

## 2017-09-29 PROCEDURE — G8417 CALC BMI ABV UP PARAM F/U: HCPCS | Performed by: INTERNAL MEDICINE

## 2017-09-29 PROCEDURE — 3014F SCREEN MAMMO DOC REV: CPT | Performed by: INTERNAL MEDICINE

## 2017-09-29 PROCEDURE — 99214 OFFICE O/P EST MOD 30 MIN: CPT | Performed by: INTERNAL MEDICINE

## 2017-09-29 PROCEDURE — G8598 ASA/ANTIPLAT THER USED: HCPCS | Performed by: INTERNAL MEDICINE

## 2017-09-29 PROCEDURE — 90688 IIV4 VACCINE SPLT 0.5 ML IM: CPT | Performed by: INTERNAL MEDICINE

## 2017-09-29 PROCEDURE — 1036F TOBACCO NON-USER: CPT | Performed by: INTERNAL MEDICINE

## 2017-09-29 PROCEDURE — 3017F COLORECTAL CA SCREEN DOC REV: CPT | Performed by: INTERNAL MEDICINE

## 2017-09-29 RX ORDER — DICYCLOMINE HCL 20 MG
20 TABLET ORAL EVERY 6 HOURS
Qty: 12 TABLET | Refills: 0 | Status: SHIPPED | OUTPATIENT
Start: 2017-09-29 | End: 2017-11-15

## 2017-09-29 ASSESSMENT — ENCOUNTER SYMPTOMS
ABDOMINAL PAIN: 0
ABDOMINAL DISTENTION: 0
DIARRHEA: 1
EYE PAIN: 0
CHOKING: 0
SHORTNESS OF BREATH: 0
VISUAL CHANGE: 0
CHEST TIGHTNESS: 0
COUGH: 0
TROUBLE SWALLOWING: 0
BLOOD IN STOOL: 0
BOWEL INCONTINENCE: 0
VOICE CHANGE: 0

## 2017-10-08 ENCOUNTER — APPOINTMENT (OUTPATIENT)
Dept: GENERAL RADIOLOGY | Age: 57
DRG: 682 | End: 2017-10-08
Payer: MEDICARE

## 2017-10-08 ENCOUNTER — HOSPITAL ENCOUNTER (INPATIENT)
Age: 57
LOS: 4 days | Discharge: HOME HEALTH | DRG: 682 | End: 2017-10-13
Attending: STUDENT IN AN ORGANIZED HEALTH CARE EDUCATION/TRAINING PROGRAM | Admitting: INTERNAL MEDICINE
Payer: MEDICARE

## 2017-10-08 ENCOUNTER — APPOINTMENT (OUTPATIENT)
Dept: CT IMAGING | Age: 57
DRG: 682 | End: 2017-10-08
Payer: MEDICARE

## 2017-10-08 DIAGNOSIS — E87.20 METABOLIC ACIDOSIS: ICD-10-CM

## 2017-10-08 DIAGNOSIS — E11.65 TYPE 2 DIABETES MELLITUS WITH HYPERGLYCEMIA, UNSPECIFIED LONG TERM INSULIN USE STATUS: ICD-10-CM

## 2017-10-08 DIAGNOSIS — D72.829 LEUKOCYTOSIS, UNSPECIFIED TYPE: ICD-10-CM

## 2017-10-08 DIAGNOSIS — R77.8 ELEVATED TROPONIN: ICD-10-CM

## 2017-10-08 DIAGNOSIS — R79.89 ELEVATED PROLACTIN LEVEL: ICD-10-CM

## 2017-10-08 DIAGNOSIS — J98.01 BRONCHOSPASM, ACUTE: ICD-10-CM

## 2017-10-08 DIAGNOSIS — R55 SYNCOPE AND COLLAPSE: Primary | ICD-10-CM

## 2017-10-08 DIAGNOSIS — N17.9 ACUTE ON CHRONIC RENAL FAILURE (HCC): ICD-10-CM

## 2017-10-08 DIAGNOSIS — R41.0 DISORIENTATION: ICD-10-CM

## 2017-10-08 DIAGNOSIS — I95.9 TRANSIENT HYPOTENSION: ICD-10-CM

## 2017-10-08 DIAGNOSIS — N18.9 ACUTE ON CHRONIC RENAL FAILURE (HCC): ICD-10-CM

## 2017-10-08 DIAGNOSIS — E66.9 OBESITY (BMI 30-39.9): ICD-10-CM

## 2017-10-08 LAB
ABO/RH: NORMAL
ALBUMIN SERPL-MCNC: 3.9 G/DL (ref 3.9–4.9)
ALP BLD-CCNC: 105 U/L (ref 40–130)
ALT SERPL-CCNC: 12 U/L (ref 0–33)
AMMONIA: 35 UMOL/L (ref 11–51)
AMPHETAMINE SCREEN, URINE: NORMAL
ANION GAP SERPL CALCULATED.3IONS-SCNC: 20 MEQ/L (ref 7–13)
ANTIBODY SCREEN: NORMAL
APTT: 29 SEC (ref 21.6–35.4)
AST SERPL-CCNC: 12 U/L (ref 0–35)
BACTERIA: ABNORMAL /HPF
BARBITURATE SCREEN URINE: NORMAL
BASE EXCESS ARTERIAL: -19 (ref -3–3)
BASE EXCESS ARTERIAL: -19 (ref -3–3)
BASOPHILS ABSOLUTE: 0 K/UL (ref 0–0.2)
BASOPHILS RELATIVE PERCENT: 0.3 %
BENZODIAZEPINE SCREEN, URINE: NORMAL
BETA-HYDROXYBUTYRATE: 1.1 MG/DL (ref 0.2–2.8)
BILIRUB SERPL-MCNC: 0.3 MG/DL (ref 0–1.2)
BILIRUBIN URINE: NEGATIVE
BLOOD, URINE: ABNORMAL
BUN BLDV-MCNC: 113 MG/DL (ref 6–20)
C-REACTIVE PROTEIN, HIGH SENSITIVITY: 10.7 MG/L (ref 0–5)
CALCIUM IONIZED: 1.36 MMOL/L (ref 1.12–1.32)
CALCIUM SERPL-MCNC: 9.1 MG/DL (ref 8.6–10.2)
CANNABINOID SCREEN URINE: NORMAL
CHLORIDE BLD-SCNC: 103 MEQ/L (ref 98–107)
CHOLESTEROL, TOTAL: 99 MG/DL (ref 0–199)
CHP ED QC CHECK: NORMAL
CHP ED QC CHECK: NORMAL
CLARITY: ABNORMAL
CO2: 8 MEQ/L (ref 22–29)
COCAINE METABOLITE SCREEN URINE: NORMAL
COLOR: YELLOW
CREAT SERPL-MCNC: 4.55 MG/DL (ref 0.5–0.9)
EOSINOPHILS ABSOLUTE: 0.1 K/UL (ref 0–0.7)
EOSINOPHILS RELATIVE PERCENT: 0.5 %
ETHANOL PERCENT: NORMAL G/DL
ETHANOL: <10 MG/DL (ref 0–0.08)
GFR AFRICAN AMERICAN: 11.9
GFR AFRICAN AMERICAN: 15
GFR NON-AFRICAN AMERICAN: 13
GFR NON-AFRICAN AMERICAN: 9.9
GLOBULIN: 3 G/DL (ref 2.3–3.5)
GLUCOSE BLD-MCNC: 176 MG/DL
GLUCOSE BLD-MCNC: 244 MG/DL (ref 60–115)
GLUCOSE BLD-MCNC: 319 MG/DL (ref 60–115)
GLUCOSE BLD-MCNC: 324 MG/DL (ref 74–109)
GLUCOSE BLD-MCNC: 343 MG/DL (ref 60–115)
GLUCOSE URINE: NEGATIVE MG/DL
HCO3 ARTERIAL: 9.4 MMOL/L (ref 21–29)
HCO3 ARTERIAL: 9.7 MMOL/L (ref 21–29)
HCT VFR BLD CALC: 31.8 % (ref 37–47)
HDLC SERPL-MCNC: 33 MG/DL (ref 40–59)
HEMOGLOBIN: 10.6 G/DL (ref 12–16)
HEMOGLOBIN: 10.8 GM/DL (ref 12–16)
INR BLD: 1.1
KETONES, URINE: NEGATIVE MG/DL
LACTATE: 0.64 MMOL/L (ref 0.4–2)
LACTIC ACID: 1 MMOL/L (ref 0.5–2.2)
LDL CHOLESTEROL CALCULATED: 38 MG/DL (ref 0–129)
LEUKOCYTE ESTERASE, URINE: ABNORMAL
LYMPHOCYTES ABSOLUTE: 2.1 K/UL (ref 1–4.8)
LYMPHOCYTES RELATIVE PERCENT: 16.4 %
Lab: NORMAL
MAGNESIUM: 2.1 MG/DL (ref 1.7–2.3)
MCH RBC QN AUTO: 27.6 PG (ref 27–31.3)
MCHC RBC AUTO-ENTMCNC: 33.2 % (ref 33–37)
MCV RBC AUTO: 83.1 FL (ref 82–100)
MONOCYTES ABSOLUTE: 0.7 K/UL (ref 0.2–0.8)
MONOCYTES RELATIVE PERCENT: 5.7 %
NEUTROPHILS ABSOLUTE: 9.8 K/UL (ref 1.4–6.5)
NEUTROPHILS RELATIVE PERCENT: 77.1 %
NITRITE, URINE: NEGATIVE
O2 SAT, ARTERIAL: 95 % (ref 93–100)
O2 SAT, ARTERIAL: 97 % (ref 93–100)
OPIATE SCREEN URINE: NORMAL
PCO2 ARTERIAL: 25 MM HG (ref 35–45)
PCO2 ARTERIAL: 27 MM HG (ref 35–45)
PDW BLD-RTO: 14.8 % (ref 11.5–14.5)
PERFORMED ON: ABNORMAL
PH ARTERIAL: 7.16 (ref 7.35–7.45)
PH ARTERIAL: 7.18 (ref 7.35–7.45)
PH UA: 5.5 (ref 5–9)
PHENCYCLIDINE SCREEN URINE: NORMAL
PLATELET # BLD: 212 K/UL (ref 130–400)
PO2 ARTERIAL: 116 MM HG (ref 75–108)
PO2 ARTERIAL: 97 MM HG (ref 75–108)
POC CHLORIDE: 112 MEQ/L (ref 99–110)
POC CREATININE: 3.7 MG/DL (ref 0.6–1.1)
POC FIO2: 21
POC FIO2: 21
POC HEMATOCRIT: 32 % (ref 36–48)
POC POTASSIUM: 4.4 MEQ/L (ref 3.5–5.1)
POC SAMPLE TYPE: ABNORMAL
POC SAMPLE TYPE: ABNORMAL
POC SODIUM: 134 MEQ/L (ref 136–145)
POTASSIUM SERPL-SCNC: 4.7 MEQ/L (ref 3.5–5.1)
PREGNANCY TEST URINE, POC: NORMAL
PRO-BNP: 3012 PG/ML
PROLACTIN: 49.1 NG/ML
PROLACTIN: NORMAL NG/ML
PROTEIN UA: 30 MG/DL
PROTHROMBIN TIME: 11.2 SEC (ref 8.1–13.7)
RBC # BLD: 3.83 M/UL (ref 4.2–5.4)
RBC UA: ABNORMAL /HPF (ref 0–2)
SODIUM BLD-SCNC: 131 MEQ/L (ref 132–144)
SPECIFIC GRAVITY UA: 1.01 (ref 1–1.03)
TCO2 ARTERIAL: 10 (ref 22–29)
TCO2 ARTERIAL: 11 (ref 22–29)
TOTAL CK: 138 U/L (ref 0–170)
TOTAL PROTEIN: 6.9 G/DL (ref 6.4–8.1)
TRIGL SERPL-MCNC: 142 MG/DL (ref 0–200)
TROPONIN: 0.02 NG/ML (ref 0–0.01)
TROPONIN: 0.03 NG/ML (ref 0–0.01)
TSH SERPL DL<=0.05 MIU/L-ACNC: 0.57 UIU/ML (ref 0.27–4.2)
URINE REFLEX TO CULTURE: YES
UROBILINOGEN, URINE: 0.2 E.U./DL
WBC # BLD: 12.8 K/UL (ref 4.8–10.8)
WBC UA: >100 /HPF (ref 0–5)

## 2017-10-08 PROCEDURE — 80307 DRUG TEST PRSMV CHEM ANLYZR: CPT

## 2017-10-08 PROCEDURE — 72125 CT NECK SPINE W/O DYE: CPT

## 2017-10-08 PROCEDURE — 82948 REAGENT STRIP/BLOOD GLUCOSE: CPT

## 2017-10-08 PROCEDURE — 82435 ASSAY OF BLOOD CHLORIDE: CPT

## 2017-10-08 PROCEDURE — 6370000000 HC RX 637 (ALT 250 FOR IP): Performed by: STUDENT IN AN ORGANIZED HEALTH CARE EDUCATION/TRAINING PROGRAM

## 2017-10-08 PROCEDURE — 85014 HEMATOCRIT: CPT

## 2017-10-08 PROCEDURE — 2580000003 HC RX 258: Performed by: PHYSICIAN ASSISTANT

## 2017-10-08 PROCEDURE — 83880 ASSAY OF NATRIURETIC PEPTIDE: CPT

## 2017-10-08 PROCEDURE — 86141 C-REACTIVE PROTEIN HS: CPT

## 2017-10-08 PROCEDURE — 82010 KETONE BODYS QUAN: CPT

## 2017-10-08 PROCEDURE — 87086 URINE CULTURE/COLONY COUNT: CPT

## 2017-10-08 PROCEDURE — 86901 BLOOD TYPING SEROLOGIC RH(D): CPT

## 2017-10-08 PROCEDURE — 80053 COMPREHEN METABOLIC PANEL: CPT

## 2017-10-08 PROCEDURE — 6360000002 HC RX W HCPCS: Performed by: STUDENT IN AN ORGANIZED HEALTH CARE EDUCATION/TRAINING PROGRAM

## 2017-10-08 PROCEDURE — 94640 AIRWAY INHALATION TREATMENT: CPT

## 2017-10-08 PROCEDURE — G0378 HOSPITAL OBSERVATION PER HR: HCPCS

## 2017-10-08 PROCEDURE — 86900 BLOOD TYPING SEROLOGIC ABO: CPT

## 2017-10-08 PROCEDURE — 36415 COLL VENOUS BLD VENIPUNCTURE: CPT

## 2017-10-08 PROCEDURE — 82330 ASSAY OF CALCIUM: CPT

## 2017-10-08 PROCEDURE — 84146 ASSAY OF PROLACTIN: CPT

## 2017-10-08 PROCEDURE — 86850 RBC ANTIBODY SCREEN: CPT

## 2017-10-08 PROCEDURE — 6360000002 HC RX W HCPCS: Performed by: PHYSICIAN ASSISTANT

## 2017-10-08 PROCEDURE — 81001 URINALYSIS AUTO W/SCOPE: CPT

## 2017-10-08 PROCEDURE — 83735 ASSAY OF MAGNESIUM: CPT

## 2017-10-08 PROCEDURE — 99285 EMERGENCY DEPT VISIT HI MDM: CPT

## 2017-10-08 PROCEDURE — 85610 PROTHROMBIN TIME: CPT

## 2017-10-08 PROCEDURE — 36600 WITHDRAWAL OF ARTERIAL BLOOD: CPT

## 2017-10-08 PROCEDURE — G0480 DRUG TEST DEF 1-7 CLASSES: HCPCS

## 2017-10-08 PROCEDURE — 82550 ASSAY OF CK (CPK): CPT

## 2017-10-08 PROCEDURE — 87077 CULTURE AEROBIC IDENTIFY: CPT

## 2017-10-08 PROCEDURE — 87040 BLOOD CULTURE FOR BACTERIA: CPT

## 2017-10-08 PROCEDURE — 84484 ASSAY OF TROPONIN QUANT: CPT

## 2017-10-08 PROCEDURE — 70450 CT HEAD/BRAIN W/O DYE: CPT

## 2017-10-08 PROCEDURE — 93005 ELECTROCARDIOGRAM TRACING: CPT

## 2017-10-08 PROCEDURE — 96365 THER/PROPH/DIAG IV INF INIT: CPT

## 2017-10-08 PROCEDURE — 82803 BLOOD GASES ANY COMBINATION: CPT

## 2017-10-08 PROCEDURE — 82140 ASSAY OF AMMONIA: CPT

## 2017-10-08 PROCEDURE — 85025 COMPLETE CBC W/AUTO DIFF WBC: CPT

## 2017-10-08 PROCEDURE — 85730 THROMBOPLASTIN TIME PARTIAL: CPT

## 2017-10-08 PROCEDURE — 82565 ASSAY OF CREATININE: CPT

## 2017-10-08 PROCEDURE — 71020 XR CHEST STANDARD TWO VW: CPT

## 2017-10-08 PROCEDURE — 83605 ASSAY OF LACTIC ACID: CPT

## 2017-10-08 PROCEDURE — 80061 LIPID PANEL: CPT

## 2017-10-08 PROCEDURE — 84132 ASSAY OF SERUM POTASSIUM: CPT

## 2017-10-08 PROCEDURE — 74176 CT ABD & PELVIS W/O CONTRAST: CPT

## 2017-10-08 PROCEDURE — 84443 ASSAY THYROID STIM HORMONE: CPT

## 2017-10-08 PROCEDURE — 87186 SC STD MICRODIL/AGAR DIL: CPT

## 2017-10-08 RX ORDER — ACETAMINOPHEN 325 MG/1
650 TABLET ORAL EVERY 4 HOURS PRN
Status: DISCONTINUED | OUTPATIENT
Start: 2017-10-08 | End: 2017-10-13 | Stop reason: HOSPADM

## 2017-10-08 RX ORDER — ALBUTEROL SULFATE 2.5 MG/3ML
2.5 SOLUTION RESPIRATORY (INHALATION) ONCE
Status: COMPLETED | OUTPATIENT
Start: 2017-10-08 | End: 2017-10-08

## 2017-10-08 RX ORDER — NITROGLYCERIN 0.4 MG/1
0.4 TABLET SUBLINGUAL EVERY 5 MIN PRN
Status: DISCONTINUED | OUTPATIENT
Start: 2017-10-08 | End: 2017-10-13 | Stop reason: HOSPADM

## 2017-10-08 RX ORDER — ATORVASTATIN CALCIUM 20 MG/1
20 TABLET, FILM COATED ORAL NIGHTLY
Status: DISCONTINUED | OUTPATIENT
Start: 2017-10-08 | End: 2017-10-08 | Stop reason: ALTCHOICE

## 2017-10-08 RX ORDER — CLOPIDOGREL BISULFATE 75 MG/1
75 TABLET ORAL ONCE
Status: DISCONTINUED | OUTPATIENT
Start: 2017-10-08 | End: 2017-10-13 | Stop reason: HOSPADM

## 2017-10-08 RX ORDER — ASPIRIN 81 MG/1
324 TABLET, CHEWABLE ORAL ONCE
Status: COMPLETED | OUTPATIENT
Start: 2017-10-08 | End: 2017-10-08

## 2017-10-08 RX ORDER — OXYBUTYNIN CHLORIDE 5 MG/1
10 TABLET, EXTENDED RELEASE ORAL 2 TIMES DAILY
Status: DISCONTINUED | OUTPATIENT
Start: 2017-10-08 | End: 2017-10-13 | Stop reason: HOSPADM

## 2017-10-08 RX ORDER — IPRATROPIUM BROMIDE AND ALBUTEROL SULFATE 2.5; .5 MG/3ML; MG/3ML
1 SOLUTION RESPIRATORY (INHALATION) ONCE
Status: COMPLETED | OUTPATIENT
Start: 2017-10-08 | End: 2017-10-08

## 2017-10-08 RX ORDER — SODIUM CHLORIDE 0.9 % (FLUSH) 0.9 %
10 SYRINGE (ML) INJECTION PRN
Status: DISCONTINUED | OUTPATIENT
Start: 2017-10-08 | End: 2017-10-13 | Stop reason: HOSPADM

## 2017-10-08 RX ORDER — INSULIN GLARGINE 100 [IU]/ML
50 INJECTION, SOLUTION SUBCUTANEOUS NIGHTLY
Status: DISCONTINUED | OUTPATIENT
Start: 2017-10-08 | End: 2017-10-13 | Stop reason: HOSPADM

## 2017-10-08 RX ORDER — DICYCLOMINE HCL 20 MG
20 TABLET ORAL EVERY 6 HOURS SCHEDULED
Status: DISCONTINUED | OUTPATIENT
Start: 2017-10-09 | End: 2017-10-13 | Stop reason: HOSPADM

## 2017-10-08 RX ORDER — CHOLECALCIFEROL (VITAMIN D3) 125 MCG
1000 CAPSULE ORAL DAILY
Status: DISCONTINUED | OUTPATIENT
Start: 2017-10-09 | End: 2017-10-13 | Stop reason: HOSPADM

## 2017-10-08 RX ORDER — ARIPIPRAZOLE 10 MG/1
10 TABLET ORAL DAILY
Status: DISCONTINUED | OUTPATIENT
Start: 2017-10-09 | End: 2017-10-13 | Stop reason: HOSPADM

## 2017-10-08 RX ORDER — CARVEDILOL 12.5 MG/1
12.5 TABLET ORAL 2 TIMES DAILY
Status: DISCONTINUED | OUTPATIENT
Start: 2017-10-08 | End: 2017-10-13 | Stop reason: HOSPADM

## 2017-10-08 RX ORDER — SODIUM CHLORIDE 9 MG/ML
INJECTION, SOLUTION INTRAVENOUS CONTINUOUS
Status: DISCONTINUED | OUTPATIENT
Start: 2017-10-08 | End: 2017-10-09

## 2017-10-08 RX ORDER — ASPIRIN 81 MG/1
81 TABLET ORAL DAILY
Status: DISCONTINUED | OUTPATIENT
Start: 2017-10-09 | End: 2017-10-13 | Stop reason: HOSPADM

## 2017-10-08 RX ORDER — ONDANSETRON 2 MG/ML
4 INJECTION INTRAMUSCULAR; INTRAVENOUS EVERY 6 HOURS PRN
Status: DISCONTINUED | OUTPATIENT
Start: 2017-10-08 | End: 2017-10-13 | Stop reason: HOSPADM

## 2017-10-08 RX ORDER — ATORVASTATIN CALCIUM 40 MG/1
40 TABLET, FILM COATED ORAL NIGHTLY
Status: DISCONTINUED | OUTPATIENT
Start: 2017-10-08 | End: 2017-10-13 | Stop reason: HOSPADM

## 2017-10-08 RX ORDER — SODIUM CHLORIDE 0.9 % (FLUSH) 0.9 %
10 SYRINGE (ML) INJECTION EVERY 12 HOURS SCHEDULED
Status: DISCONTINUED | OUTPATIENT
Start: 2017-10-08 | End: 2017-10-13 | Stop reason: HOSPADM

## 2017-10-08 RX ORDER — GABAPENTIN 300 MG/1
300 CAPSULE ORAL 2 TIMES DAILY
Status: DISCONTINUED | OUTPATIENT
Start: 2017-10-08 | End: 2017-10-13 | Stop reason: HOSPADM

## 2017-10-08 RX ADMIN — IPRATROPIUM BROMIDE 0.5 MG: 0.5 SOLUTION RESPIRATORY (INHALATION) at 15:26

## 2017-10-08 RX ADMIN — CEFTRIAXONE 1 G: 1 INJECTION, POWDER, FOR SOLUTION INTRAMUSCULAR; INTRAVENOUS at 22:56

## 2017-10-08 RX ADMIN — Medication 10 ML: at 22:46

## 2017-10-08 RX ADMIN — ENOXAPARIN SODIUM 100 MG: 100 INJECTION SUBCUTANEOUS at 20:04

## 2017-10-08 RX ADMIN — ASPIRIN 81 MG 324 MG: 81 TABLET ORAL at 19:07

## 2017-10-08 RX ADMIN — IPRATROPIUM BROMIDE AND ALBUTEROL SULFATE 1 AMPULE: .5; 3 SOLUTION RESPIRATORY (INHALATION) at 15:25

## 2017-10-08 RX ADMIN — ALBUTEROL SULFATE 2.5 MG: 2.5 SOLUTION RESPIRATORY (INHALATION) at 15:26

## 2017-10-08 RX ADMIN — SODIUM CHLORIDE: 9 INJECTION, SOLUTION INTRAVENOUS at 22:46

## 2017-10-08 ASSESSMENT — ENCOUNTER SYMPTOMS
SHORTNESS OF BREATH: 0
CHEST TIGHTNESS: 0
ABDOMINAL PAIN: 0
COUGH: 0
TROUBLE SWALLOWING: 0
SINUS PRESSURE: 0
VOMITING: 0
BACK PAIN: 0
DIARRHEA: 1

## 2017-10-08 ASSESSMENT — PULMONARY FUNCTION TESTS: PEFR_L/MIN: 120

## 2017-10-08 NOTE — ED PROVIDER NOTES
interpreted by the emergency physician with the below findings:    Chest x-ray: No infiltrate,no pleural effusion, no free air. Interpretation per the Radiologist below, if available at the time of this note:    CT ABDOMEN PELVIS WO IV CONTRAST Additional Contrast? None   Final Result   1. NORMAL APPENDIX AND MILD COLONIC DIVERTICULOSIS. NO APPENDICITIS, DIVERTICULITIS OR COLITIS. 2. SMALL BOWEL APPEARS UNREMARKABLE AND THERE IS NO EVIDENCE OF BOWEL OBSTRUCTION. 3. NO ORGANOMEGALY, MASS, \"FREE FLUID\", ABSCESS OR PNEUMOPERITONEUM IN THE ABDOMEN. 4. PRIOR HYSTERECTOMY OTHERWISE, UNREMARKABLE CT SCAN OF THE PELVIS. 5. BLADDER WALL THICKENING MAY BE FROM NONDISTENTION OF THE BLADDER OR CYSTITIS. All CT scans at this facility use dose modulation, iterative reconstruction, and/or weight based dosing when appropriate to reduce radiation dose to as low as reasonably achievable. CT CERVICAL SPINE WO CONTRAST   Final Result      NO ACUTE FRACTURES. THERE IS A NODULE IN THE LEFT LOBE OF THYROID. CORRELATE CLINICALLY AND FOLLOW-UP      All CT scans at this facility use dose modulation, iterative reconstruction, and/or weight based dosing when appropriate to reduce radiation dose to as low as reasonably achievable.       CT Head WO Contrast   Final Result      XR CHEST STANDARD (2 VW)    (Results Pending)         ED BEDSIDE ULTRASOUND:   Performed by ED Physician - none    LABS:  Labs Reviewed   HIGH SENSITIVITY CRP - Abnormal; Notable for the following:        Result Value    CRP High Sensitivity 10.7 (*)     All other components within normal limits    Narrative:     CALL  [facility] tel. [phone],  CO2 results called to and read back by FLAQUITO Medley, 10/08/2017 16:17, by  GUILLERMO   COMPREHENSIVE METABOLIC PANEL - Abnormal; Notable for the following:     Sodium 131 (*)     CO2 8 (*)     Anion Gap 20 (*)     Glucose 324 (*)      (*)     CREATININE 4.55 (*)     GFR Non- 9.9 (*) GFR  11.9 (*)     All other components within normal limits    Narrative:     CALL  [facility] tel. [phone],  BUN results called to and read back by FLAQUITO Foster, 10/08/2017 16:28, by  REYAL  CO2 results called to and read back by FLAQUITO Foster, 10/08/2017 16:17, by  REYAL   CBC WITH AUTO DIFFERENTIAL - Abnormal; Notable for the following:     WBC 12.8 (*)     RBC 3.83 (*)     Hemoglobin 10.6 (*)     Hematocrit 31.8 (*)     RDW 14.8 (*)     Neutrophils # 9.8 (*)     All other components within normal limits   LIPID PANEL - Abnormal; Notable for the following:     HDL 33 (*)     All other components within normal limits    Narrative:     CALL  [facility] tel. [phone],  CO2 results called to and read back by FLAQUITO Foster, 10/08/2017 16:17, by  REYAL   TROPONIN - Abnormal; Notable for the following:     Troponin 0.028 (*)     All other components within normal limits    Narrative:     CALL  [facility] tel. [phone],  Troponin results called to and read back by FLAQUITO Foster, 10/08/2017  16:16, by REYAL   POCT ARTERIAL - Abnormal; Notable for the following:     POC Sodium 134 (*)     POC Chloride 112 (*)     POC Glucose 319 (*)     POC Creatinine 3.7 (*)     GFR Non- 13 (*)     GFR African American 15 (*)     Calcium, Ion 1.36 (*)     pH, Arterial 7.180 (*)     pCO2, Arterial 25 (*)     pO2, Arterial 116 (*)     HCO3, Arterial 9.4 (*)     Base Excess, Arterial -19 (*)     O2 Sat, Arterial 97 (*)     TCO2, Arterial 10 (*)     POC Hematocrit 32 (*)     Hemoglobin 10.8 (*)     All other components within normal limits   POCT GLUCOSE - Abnormal; Notable for the following:     POC Glucose 343 (*)     All other components within normal limits   POC PREGNANCY UR-QUAL - Normal   POCT GLUCOSE - Normal   CULTURE BLOOD #2   CULTURE BLOOD #1   CK    Narrative:     CALL  [facility] tel. [phone],  CO2 results called to and read back by FLAQUITO Foster, 10/08/2017 16:17, bladder which may be due to nondistention or urinary tract infection. Patient has not produced any urine here in the ER we are going to straight catheter to obtain a sample. I spoke with the hospitalist and he will follow-up on the urine sample to determine whether or not this patient needs to have antibiotics. I spoke with Dr. Angelika Agarwal and he has accepted the patient [facility] hospitalist service. Reexamined the patient her blood pressures improved. I explained the findings to patient her family did verbalize understanding no further questions. CRITICAL CARE TIME   Total Critical Care time was 33minutes, excluding separately reportable procedures. There was a high probability of clinically significant/life threatening deterioration in the patient's condition which required my urgent intervention. CONSULTS:  IP CONSULT TO HOSPITALIST    PROCEDURES:  Unless otherwise noted below, none     Procedures    FINAL IMPRESSION      1. Syncope and collapse    2. Elevated troponin    3. Acute on chronic renal failure ([facility] [address]    4. Type 2 diabetes mellitus with hyperglycemia, unspecified long term insulin use status    5. Elevated prolactin level (HCC)    6. Bronchospasm, acute    7. Obesity (BMI 30-39.9)    8. Metabolic acidosis    9. Transient hypotension    10. Leukocytosis, unspecified type    11. Disorientation          DISPOSITION/PLAN   DISPOSITION     PATIENT REFERRED TO:  No follow-up provider specified.     DISCHARGE MEDICATIONS:  New Prescriptions    No medications on file          (Please note that portions of this note were completed with a voice recognition program.  Efforts were made to edit the dictations but occasionally words are mis-transcribed.)    Haley Fraga DO (electronically signed)  Attending Emergency Physician          Haley Fraga DO  10/08/17 DO [place]  10/08/17 0317

## 2017-10-08 NOTE — H&P
third toe of right foot ([facility] [address]     Type 2 diabetes mellitus with renal manifestations, controlled ([facility] [address]     Insulin dependent    Urinary incontinence due to cognitive impairment     Vitamin D deficiency      PAST SURGICAL HISTORY:    Past Surgical History:   Procedure Laterality Date     SECTION      x1    COLONOSCOPY  2014    Dr. Paz Henderson      x1 Dr. Joanna Graham, TOTAL ABDOMINAL      one ovary intact, Dr Bam Castro, menorrhagia    TOE AMPUTATION Right     TOTAL KNEE ARTHROPLASTY  16    Dr Pabon Rather:    Family History   Problem Relation Age of Onset    Cancer Mother 76     survived   Siddharth Orchard Hypertension Father     Diabetes Sister     Mental Illness Sister      SOCIAL HISTORY:    Social History     Social History    Marital status:      Spouse name: N/A    Number of children: 2    Years of education: N/A     Occupational History    disabled      Social History Main Topics    Smoking status: Passive Smoke Exposure - Never Smoker    Smokeless tobacco: Never Used    Alcohol use No    Drug use: No    Sexual activity: Not Currently     Other Topics Concern    Not on file     Social History Narrative    Born in [place], one of 5    Keeps in touch with twin sister Danilo Castromarisa, , 2 children    Worked at [employer] due to mental illness    Lived at [employer], was discharged, returned to independent living in 2017 and has adjusted well    One son and one daughter, keep in touch     MEDICATIONS:   Prior to Admission medications    Medication Sig Start Date End Date Taking?  Authorizing Provider   dicyclomine (BENTYL) 20 MG tablet Take 1 tablet by mouth every 6 hours for 3 days 9/29/17 10/2/17  Shira Griffiths MD   NOVOFINE AUTOCOVER 30G X 8 MM MISC 1 each by Does not apply route 3 times daily 17   Shira Griffiths MD   nitroGLYCERIN (NITROSTAT) 0.4 MG SL tablet DISSOLVE 1 TAB UNDER THE TONGUE AS NEEDED FOR CHEST PAIN EVERY 5 MINUTES UP TO 3 TIMES.  IF NO RELIEF CALL 911. 8/31/17   Ramila Okeefe MD   NYSTATIN 848873 UNIT/GM powder APPLY TO ABDOMINAL FOLDS EVERY 12 HOURS AS NEEDED 8/31/17   Ramila Okeefe MD   omeprazole (PRILOSEC) 20 MG delayed release capsule TAKE (1) CAPSULE BY MOUTH DAILY AS NEEDED FOR HEATBURN 8/31/17   Ramila Okeefe MD   lisinopril (PRINIVIL;ZESTRIL) 10 MG tablet TAKE (1) TABLET BY MOUTH DAILY 8/31/17   Ramila Okeefe MD   docusate sodium (COLACE) 100 MG capsule TAKE (1) CAPSULE BY MOUTH TWICE DAILY 8/31/17   Ramila Oekefe MD   gabapentin (NEURONTIN) 300 MG capsule TAKE (1) CAPSULE BY MOUTH TWICE DAILY 8/31/17   Ramila Okeefe MD   atorvastatin (LIPITOR) 40 MG tablet TAKE (1) TABLET BY MOUTH NIGHTLY 8/31/17   Ramila Okeefe MD   traZODone (DESYREL) 50 MG tablet TAKE 1-2 TABLETS BY MOUTH DAILY AT BEDTIME AS NEEDED FOR INSOMNIA 8/31/17   Paxton Hyman MD   ASPIRIN LOW DOSE 81 MG EC tablet TAKE (1) TABLET BY MOUTH DAILY 8/31/17   Ramila Okeefe MD   Insulin Pen Needle (BD AUTOSHIELD DUO) 30G X 5 MM MISC 1 each by Does not apply route 2 times daily For use with Lantus Solostar, DX: E11.29, IDDM 8/29/17   Ramila Okeefe MD   FREESTYLE LITE strip TEST every 6 hours 8/15/17   Ramila Okeefe MD   ARIPiprazole (ABILIFY) 10 MG tablet Take 1 tablet by mouth daily 8/1/17   Ramila Okeefe MD   insulin aspart (NOVOLOG FLEXPEN) 100 UNIT/ML injection pen Inject 20 Units into the skin 3 times daily (before meals) 8/1/17   Ramila Okeefe MD   MAPAP 500 MG tablet TAKE 1 TABLET BY MOUTH EVERY 6 HOURS AS NEEDED FOR PAIN OR FEVER 7/5/17   Ramila Okeefe MD   LANTUS SOLOSTAR 100 UNIT/ML injection pen INJECT 50 UNITS INTO THE SKIN TWICE DAILY 7/5/17   Ramila Okeefe MD   Alcohol Swabs (EASY TOUCH ALCOHOL PREP MEDIUM) 70 % PADS USE AS DIRECTED THREE TIMES A DAY 7/3/17   Ramila Okeefe MD   TRULICITY 9.04 XX/0.4IB SOPN INJECT 0.5ML SUBCUTANEOUSLY ONCE WEEKLY AS DIRECTED 7/3/17   Sharron Xiong MD   sertraline (ZOLOFT) 100 MG tablet Take 1 tablet by mouth daily 4/10/17   Sharron Xiong MD   Blood Glucose Monitoring Suppl (FREESTYLE LITE) CORETTA use as directed 4/6/17   Historical Provider, MD   FREESTYLE LANCETS MISC TEST every 6 hours 4/6/17   Historical Provider, MD   carvedilol (COREG) 12.5 MG tablet Take 1 tablet by mouth 2 times daily 4/6/17   Sharron Xiong MD   clopidogrel (PLAVIX) 75 MG tablet Take 1 tablet by mouth nightly 4/6/17   Sharron Xiong MD   oxybutynin (DITROPAN XL) 10 MG extended release tablet Take one po bid 4/4/17   Sharron Xiong MD   Vitamin D, Cholecalciferol, 1000 UNITS CAPS Take 1,000 Units by mouth daily 4/4/17   Sharron Xiong MD   vitamin B-12 (CYANOCOBALAMIN) 1000 MCG tablet Take 1 tablet by mouth daily 4/4/17   Sharron Xiong MD       ALLERGIES: Codeine    REVIEW OF SYSTEM:   ROS as noted in HPI, 12 point ROS reviewed and otherwise negative.     OBJECTIVE  PHYSICAL EXAM: /60   Pulse 77   Temp 97.4 °F (36.3 °C) (Oral)   Resp 18   Ht 5' 7\" (1.702 m)   Wt 230 lb (104.3 kg)   SpO2 98%   BMI 36.02 kg/m²     CONSTITUTIONAL:  alert, no apparent distress and appears older than stated age  EYES:  pupils equal, round and reactive to light, sclera clear and conjunctiva normal  ENT:  normocepalic, without obvious abnormality, atraumatic  NECK:  supple, symmetrical, trachea midline and skin normal  LUNGS:  no increased work of breathing and diminished breath sounds throughout lungs  CARDIOVASCULAR:  normal apical pulses and normal S1 and S2  ABDOMEN:  normal bowel sounds and tenderness noted in the right lower quadrant  MUSCULOSKELETAL:  there is no redness, warmth, or swelling of the joints  NEUROLOGIC:  Mental Status Exam:  Level of Alertness:   awake  Orientation:   person, place, time  SKIN:  normal skin color, texture, turgor    DATA:     Diagnostic tests reviewed for today's visit:    Most recent labs and imaging results reviewed.      LABS:    Recent Results (from the past 24 hour(s))   High sensitivity CRP    Collection Time: 10/08/17  3:00 PM   Result Value Ref Range    CRP High Sensitivity 10.7 (H) 0.0 - 5.0 mg/L   Comprehensive Metabolic Panel    Collection Time: 10/08/17  3:00 PM   Result Value Ref Range    Sodium 131 (L) 132 - 144 mEq/L    Potassium 4.7 3.5 - 5.1 mEq/L    Chloride 103 98 - 107 mEq/L    CO2 8 (LL) 22 - 29 mEq/L    Anion Gap 20 (H) 7 - 13 mEq/L    Glucose 324 (H) 74 - 109 mg/dL     (HH) 6 - 20 mg/dL    CREATININE 4.55 (H) 0.50 - 0.90 mg/dL    GFR Non-African American 9.9 (L) >60    GFR  11.9 (L) >60    Calcium 9.1 8.6 - 10.2 mg/dL    Total Protein 6.9 6.4 - 8.1 g/dL    Alb 3.9 3.9 - 4.9 g/dL    Total Bilirubin 0.3 0.0 - 1.2 mg/dL    Alkaline Phosphatase 105 40 - 130 U/L    ALT 12 0 - 33 U/L    AST 12 0 - 35 U/L    Globulin 3.0 2.3 - 3.5 g/dL   CK    Collection Time: 10/08/17  3:00 PM   Result Value Ref Range    Total  0 - 170 U/L   CBC Auto Differential    Collection Time: 10/08/17  3:00 PM   Result Value Ref Range    WBC 12.8 (H) 4.8 - 10.8 K/uL    RBC 3.83 (L) 4.20 - 5.40 M/uL    Hemoglobin 10.6 (L) 12.0 - 16.0 g/dL    Hematocrit 31.8 (L) 37.0 - 47.0 %    MCV 83.1 82.0 - 100.0 fL    MCH 27.6 27.0 - 31.3 pg    MCHC 33.2 33.0 - 37.0 %    RDW 14.8 (H) 11.5 - 14.5 %    Platelets 452 819 - 295 K/uL    Neutrophils % 77.1 %    Lymphocytes % 16.4 %    Monocytes % 5.7 %    Eosinophils % 0.5 %    Basophils % 0.3 %    Neutrophils # 9.8 (H) 1.4 - 6.5 K/uL    Lymphocytes # 2.1 1.0 - 4.8 K/uL    Monocytes # 0.7 0.2 - 0.8 K/uL    Eosinophils # 0.1 0.0 - 0.7 K/uL    Basophils # 0.0 0.0 - 0.2 K/uL   Brain Natriuretic Peptide    Collection Time: 10/08/17  3:00 PM   Result Value Ref Range    Pro-BNP 3,012 pg/mL   APTT    Collection Time: 10/08/17  3:00 PM   Result Value Ref Range    aPTT 29.0 21.6 - 35.4 sec   Lipid Panel    Collection Time: 10/08/17  3:00 PM   Result Value Ref Range 3:27 PM   Result Value Ref Range    Prolactin Not done ng/mL   POCT Glucose    Collection Time: 10/08/17  5:27 PM   Result Value Ref Range    POC Glucose 343 (H) 60 - 115 mg/dl    Performed on ACCU-CHEK        IMAGING:  Ct Abdomen Pelvis Wo Iv Contrast Additional Contrast? None    Result Date: 10/8/2017  EXAMINATION:  CT ABDOMEN AND PELVIS WITHOUT IV CONTRAST CLINICAL HISTORY:  DIABETES WITH ABDOMINAL PAIN WITH DIARRHEA X2 WEEKS, POSSIBLE COLITIS COMPARISON:  8/5/2015 TECHNIQUE:  CT abdomen and pelvis is obtained without IV contrast. FINDINGS:  There is mild colonic diverticulosis without diverticulitis. The appendix appears normal. There is no CT evidence of colitis. Small bowel appears unremarkable and there is no bowel obstruction. The liver, gallbladder, spleen, pancreas, kidneys and adrenal glands appear normal. There is no retroperitoneal adenopathy. There is no abdominal mass, \"free fluid\", abscess or pneumoperitoneum in the abdomen. CT scan of the pelvis confirms a previous hysterectomy. There is bladder wall thickening which may be secondary to nondistention of the bladder or cystitis and clinical correlation is warranted. There is no pelvic mass, adenopathy or \"free fluid\" in the pelvis. Also, there is no inguinal hernia or adenopathy. 1. NORMAL APPENDIX AND MILD COLONIC DIVERTICULOSIS. NO APPENDICITIS, DIVERTICULITIS OR COLITIS. 2. SMALL BOWEL APPEARS UNREMARKABLE AND THERE IS NO EVIDENCE OF BOWEL OBSTRUCTION. 3. NO ORGANOMEGALY, MASS, \"FREE FLUID\", ABSCESS OR PNEUMOPERITONEUM IN THE ABDOMEN. 4. PRIOR HYSTERECTOMY OTHERWISE, UNREMARKABLE CT SCAN OF THE PELVIS. 5. BLADDER WALL THICKENING MAY BE FROM NONDISTENTION OF THE BLADDER OR CYSTITIS. All CT scans at this facility use dose modulation, iterative reconstruction, and/or weight based dosing when appropriate to reduce radiation dose to as low as reasonably achievable.     Ct Head Wo Contrast    Result Date: 10/8/2017  CT HEAD WO CONTRAST CLINICAL HISTORY:  syncope, confusion Comparison: Tammie 15, 2016 TECHNIQUE: Multiple unenhanced serial axial images of the brain from the vertex of the skull to the base of the skull were performed. FINDINGS: The ventricles are dilated. This is compensatory to the surrounding moderate generalized parenchymal volume loss. No mass. No midline shift. The cisterns are patent. No acute intra-axial or extra-axial findings The visualized osseous structures are unremarkable. The visualized portion of the paranasal sinuses there is patchy opacification of the ethmoids. There is mucoperiosteal thickening of both maxillary sinuses. The,  mastoids are unremarkable. IMPRESSION NO ACUTE INTRA-AXIAL OR EXTRA-AXIAL FINDINGS. All CT scans at this facility use dose modulation, iterative reconstruction, and/or weight based dosing when appropriate to reduce radiation dose to as low as reasonably achievable. Ct Cervical Spine Wo Contrast    Result Date: 10/8/2017  CT CERVICAL SPINE WO CONTRAST CLINICAL HISTORY:  fall from standing passed out COMPARISON: NONE Findings: Multiple serial axial images of the cervical spine from the base of the skull through the upper thoracic vertebra with both sagittal and coronal reconstructions was performed. There is mild reversal of the normal expected cervical lordosis. There is multilevel degenerative joint disease. Prevertebral  soft tissues are  unremarkable. The disk spaces are narrowed at the C4-5 C5-C6, C6-C7 disc spaces No acute fractures or spondylo-listhesis. There is a nodule measuring 6 mm at the posterior aspect of the left thyroid. NO ACUTE FRACTURES. THERE IS A NODULE IN THE LEFT LOBE OF THYROID. CORRELATE CLINICALLY AND FOLLOW-UP All CT scans at this facility use dose modulation, iterative reconstruction, and/or weight based dosing when appropriate to reduce radiation dose to as low as reasonably achievable.       VTE Prophylaxis: low molecular weight heparin -  start    ASSESSMENT AND PLAN  Syncope/Encephalopathy (possibly toxic)  UTI  Metabolic acidosis  LUCÍA/CKD  Diarrhea  DM II - poorly controlled  Elevated troponin - ? 2/2 severe LUCÍA  Leukocytosis  Normocytic anemia    Admit inpt w/tele  Consult neuro and renal  NS IVF  STAT VQ scan  1mg/kg lovenox  Rocephin qd  US carotids/2d echo/EEG  Continue w/u per renal and neurology  Glucose checks w/SSI      Plan of care discussed with: patient    SIGNATURE: Rashad Cronin PA-C  DATE: October 8, 2017  TIME: 7:55 PM   Pt was seen and evaluated and discussed care with PA.  Agree with assessment and plan    Electronically signed by Beth Vyas MD on 10/8/17 at 8:16 PM      Dr. Nicol Saavedra MD - supervising physician

## 2017-10-08 NOTE — ED NOTES
Lab called with trop 0.028, CO2 of 8, BUN >100 and will dilute and repeat. Dr Barbara Briggs made aware.      Edi Martin RN  10/08/17 0463

## 2017-10-08 NOTE — ED NOTES
Report given to [facility]. Tele is on patient     Michelle Malloy.  Margi Coello, FLAQUITO  10/08/17 1940

## 2017-10-09 ENCOUNTER — APPOINTMENT (OUTPATIENT)
Dept: ULTRASOUND IMAGING | Age: 57
DRG: 682 | End: 2017-10-09
Payer: MEDICARE

## 2017-10-09 ENCOUNTER — APPOINTMENT (OUTPATIENT)
Dept: MRI IMAGING | Age: 57
DRG: 682 | End: 2017-10-09
Payer: MEDICARE

## 2017-10-09 ENCOUNTER — APPOINTMENT (OUTPATIENT)
Dept: NUCLEAR MEDICINE | Age: 57
DRG: 682 | End: 2017-10-09
Payer: MEDICARE

## 2017-10-09 LAB
ALBUMIN SERPL-MCNC: 3.5 G/DL (ref 3.9–4.9)
ALP BLD-CCNC: 93 U/L (ref 40–130)
ALT SERPL-CCNC: 10 U/L (ref 0–33)
ANION GAP SERPL CALCULATED.3IONS-SCNC: 19 MEQ/L (ref 7–13)
AST SERPL-CCNC: 11 U/L (ref 0–35)
BASOPHILS ABSOLUTE: 0.1 K/UL (ref 0–0.2)
BASOPHILS RELATIVE PERCENT: 0.5 %
BILIRUB SERPL-MCNC: 0.2 MG/DL (ref 0–1.2)
BUN BLDV-MCNC: 114 MG/DL (ref 6–20)
CALCIUM SERPL-MCNC: 8.7 MG/DL (ref 8.6–10.2)
CHLORIDE BLD-SCNC: 110 MEQ/L (ref 98–107)
CHOLESTEROL, TOTAL: 93 MG/DL (ref 0–199)
CLOSTRIDIUM DIFFICILE DNA AMPLIFICATION: NORMAL
CO2: 7 MEQ/L (ref 22–29)
CREAT SERPL-MCNC: 4.31 MG/DL (ref 0.5–0.9)
CRYPTOSPORIDIUM ANTIGEN STOOL: NORMAL
EKG ATRIAL RATE: 73 BPM
EKG ATRIAL RATE: 75 BPM
EKG P AXIS: 42 DEGREES
EKG P AXIS: 55 DEGREES
EKG P-R INTERVAL: 240 MS
EKG P-R INTERVAL: 244 MS
EKG Q-T INTERVAL: 422 MS
EKG Q-T INTERVAL: 430 MS
EKG QRS DURATION: 138 MS
EKG QRS DURATION: 142 MS
EKG QTC CALCULATION (BAZETT): 464 MS
EKG QTC CALCULATION (BAZETT): 480 MS
EKG R AXIS: -55 DEGREES
EKG R AXIS: -55 DEGREES
EKG T AXIS: 75 DEGREES
EKG T AXIS: 82 DEGREES
EKG VENTRICULAR RATE: 73 BPM
EKG VENTRICULAR RATE: 75 BPM
EOSINOPHILS ABSOLUTE: 0.2 K/UL (ref 0–0.7)
EOSINOPHILS RELATIVE PERCENT: 1.8 %
GFR AFRICAN AMERICAN: 12.7
GFR NON-AFRICAN AMERICAN: 10.5
GI BACTERIAL PATHOGENS BY PCR: NORMAL
GIARDIA ANTIGEN STOOL: NORMAL
GLOBULIN: 2.8 G/DL (ref 2.3–3.5)
GLUCOSE BLD-MCNC: 116 MG/DL (ref 60–115)
GLUCOSE BLD-MCNC: 120 MG/DL (ref 60–115)
GLUCOSE BLD-MCNC: 147 MG/DL (ref 60–115)
GLUCOSE BLD-MCNC: 164 MG/DL (ref 74–109)
GLUCOSE BLD-MCNC: 186 MG/DL (ref 60–115)
HCT VFR BLD CALC: 29.7 % (ref 37–47)
HDLC SERPL-MCNC: 29 MG/DL (ref 40–59)
HEMOGLOBIN: 9.7 G/DL (ref 12–16)
INR BLD: 1.1
LACTIC ACID: 0.5 MMOL/L (ref 0.5–2.2)
LDL CHOLESTEROL CALCULATED: 29 MG/DL (ref 0–129)
LV EF: 60 %
LVEF MODALITY: NORMAL
LYMPHOCYTES ABSOLUTE: 2.6 K/UL (ref 1–4.8)
LYMPHOCYTES RELATIVE PERCENT: 22.7 %
MCH RBC QN AUTO: 27.5 PG (ref 27–31.3)
MCHC RBC AUTO-ENTMCNC: 32.4 % (ref 33–37)
MCV RBC AUTO: 84.9 FL (ref 82–100)
MONOCYTES ABSOLUTE: 0.9 K/UL (ref 0.2–0.8)
MONOCYTES RELATIVE PERCENT: 7.7 %
NEUTROPHILS ABSOLUTE: 7.8 K/UL (ref 1.4–6.5)
NEUTROPHILS RELATIVE PERCENT: 67.3 %
PDW BLD-RTO: 14.5 % (ref 11.5–14.5)
PERFORMED ON: ABNORMAL
PLATELET # BLD: 172 K/UL (ref 130–400)
POTASSIUM SERPL-SCNC: 4 MEQ/L (ref 3.5–5.1)
PROTHROMBIN TIME: 11.8 SEC (ref 8.1–13.7)
RBC # BLD: 3.5 M/UL (ref 4.2–5.4)
SODIUM BLD-SCNC: 136 MEQ/L (ref 132–144)
TOTAL PROTEIN: 6.3 G/DL (ref 6.4–8.1)
TRIGL SERPL-MCNC: 176 MG/DL (ref 0–200)
TROPONIN: 0.02 NG/ML (ref 0–0.01)
WBC # BLD: 11.5 K/UL (ref 4.8–10.8)

## 2017-10-09 PROCEDURE — 6360000002 HC RX W HCPCS: Performed by: PHYSICIAN ASSISTANT

## 2017-10-09 PROCEDURE — 2500000003 HC RX 250 WO HCPCS: Performed by: INTERNAL MEDICINE

## 2017-10-09 PROCEDURE — 80061 LIPID PANEL: CPT

## 2017-10-09 PROCEDURE — 80053 COMPREHEN METABOLIC PANEL: CPT

## 2017-10-09 PROCEDURE — A9539 TC99M PENTETATE: HCPCS | Performed by: PHYSICIAN ASSISTANT

## 2017-10-09 PROCEDURE — 70551 MRI BRAIN STEM W/O DYE: CPT

## 2017-10-09 PROCEDURE — 87506 IADNA-DNA/RNA PROBE TQ 6-11: CPT

## 2017-10-09 PROCEDURE — A9540 TC99M MAA: HCPCS | Performed by: PHYSICIAN ASSISTANT

## 2017-10-09 PROCEDURE — 93005 ELECTROCARDIOGRAM TRACING: CPT

## 2017-10-09 PROCEDURE — 85025 COMPLETE CBC W/AUTO DIFF WBC: CPT

## 2017-10-09 PROCEDURE — 2500000003 HC RX 250 WO HCPCS

## 2017-10-09 PROCEDURE — 93880 EXTRACRANIAL BILAT STUDY: CPT

## 2017-10-09 PROCEDURE — 83605 ASSAY OF LACTIC ACID: CPT

## 2017-10-09 PROCEDURE — 78582 LUNG VENTILAT&PERFUS IMAGING: CPT

## 2017-10-09 PROCEDURE — 93306 TTE W/DOPPLER COMPLETE: CPT

## 2017-10-09 PROCEDURE — 2500000003 HC RX 250 WO HCPCS: Performed by: PHYSICIAN ASSISTANT

## 2017-10-09 PROCEDURE — 1210000000 HC MED SURG R&B

## 2017-10-09 PROCEDURE — 87328 CRYPTOSPORIDIUM AG IA: CPT

## 2017-10-09 PROCEDURE — 2580000003 HC RX 258: Performed by: PHYSICIAN ASSISTANT

## 2017-10-09 PROCEDURE — 2580000003 HC RX 258: Performed by: INTERNAL MEDICINE

## 2017-10-09 PROCEDURE — 84484 ASSAY OF TROPONIN QUANT: CPT

## 2017-10-09 PROCEDURE — 36415 COLL VENOUS BLD VENIPUNCTURE: CPT

## 2017-10-09 PROCEDURE — 6370000000 HC RX 637 (ALT 250 FOR IP): Performed by: PHYSICIAN ASSISTANT

## 2017-10-09 PROCEDURE — 87329 GIARDIA AG IA: CPT

## 2017-10-09 PROCEDURE — 87493 C DIFF AMPLIFIED PROBE: CPT

## 2017-10-09 PROCEDURE — 70544 MR ANGIOGRAPHY HEAD W/O DYE: CPT

## 2017-10-09 PROCEDURE — 6360000002 HC RX W HCPCS: Performed by: INTERNAL MEDICINE

## 2017-10-09 PROCEDURE — 85610 PROTHROMBIN TIME: CPT

## 2017-10-09 PROCEDURE — 3430000000 HC RX DIAGNOSTIC RADIOPHARMACEUTICAL: Performed by: PHYSICIAN ASSISTANT

## 2017-10-09 RX ORDER — SODIUM CHLORIDE 0.9 % (FLUSH) 0.9 %
10 SYRINGE (ML) INJECTION PRN
Status: DISCONTINUED | OUTPATIENT
Start: 2017-10-09 | End: 2017-10-13 | Stop reason: HOSPADM

## 2017-10-09 RX ORDER — SODIUM CHLORIDE 9 MG/ML
250 INJECTION, SOLUTION INTRAVENOUS ONCE
Status: COMPLETED | OUTPATIENT
Start: 2017-10-09 | End: 2017-10-10

## 2017-10-09 RX ORDER — SODIUM CHLORIDE 0.9 % (FLUSH) 0.9 %
10 SYRINGE (ML) INJECTION EVERY 12 HOURS SCHEDULED
Status: DISCONTINUED | OUTPATIENT
Start: 2017-10-09 | End: 2017-10-13 | Stop reason: HOSPADM

## 2017-10-09 RX ORDER — KIT FOR THE PREPARATION OF TECHNETIUM TC 99M PENTETATE 20 MG/1
1 INJECTION, POWDER, LYOPHILIZED, FOR SOLUTION INTRAVENOUS; RESPIRATORY (INHALATION)
Status: COMPLETED | OUTPATIENT
Start: 2017-10-09 | End: 2017-10-09

## 2017-10-09 RX ORDER — LIDOCAINE HYDROCHLORIDE 20 MG/ML
5 INJECTION, SOLUTION INFILTRATION; PERINEURAL ONCE
Status: COMPLETED | OUTPATIENT
Start: 2017-10-09 | End: 2017-10-10

## 2017-10-09 RX ORDER — WATER FOR INJ.,BACTERIOSTATIC
VIAL (ML) INJECTION
Status: COMPLETED
Start: 2017-10-09 | End: 2017-10-09

## 2017-10-09 RX ORDER — SODIUM CHLORIDE 9 MG/ML
INJECTION, SOLUTION INTRAVENOUS CONTINUOUS
Status: DISCONTINUED | OUTPATIENT
Start: 2017-10-09 | End: 2017-10-11

## 2017-10-09 RX ADMIN — Medication: at 23:07

## 2017-10-09 RX ADMIN — INSULIN GLARGINE 50 UNITS: 100 INJECTION, SOLUTION SUBCUTANEOUS at 01:52

## 2017-10-09 RX ADMIN — ASPIRIN 81 MG: 81 TABLET, COATED ORAL at 09:02

## 2017-10-09 RX ADMIN — OXYBUTYNIN CHLORIDE 10 MG: 5 TABLET, EXTENDED RELEASE ORAL at 14:32

## 2017-10-09 RX ADMIN — Medication 10 ML: at 03:35

## 2017-10-09 RX ADMIN — SODIUM CHLORIDE: 9 INJECTION, SOLUTION INTRAVENOUS at 02:58

## 2017-10-09 RX ADMIN — ARIPIPRAZOLE 10 MG: 10 TABLET ORAL at 09:03

## 2017-10-09 RX ADMIN — OXYBUTYNIN CHLORIDE 10 MG: 5 TABLET, EXTENDED RELEASE ORAL at 01:51

## 2017-10-09 RX ADMIN — ENOXAPARIN SODIUM 100 MG: 100 INJECTION SUBCUTANEOUS at 21:15

## 2017-10-09 RX ADMIN — INSULIN LISPRO 2 UNITS: 100 INJECTION, SOLUTION INTRAVENOUS; SUBCUTANEOUS at 09:12

## 2017-10-09 RX ADMIN — INSULIN LISPRO 2 UNITS: 100 INJECTION, SOLUTION INTRAVENOUS; SUBCUTANEOUS at 18:31

## 2017-10-09 RX ADMIN — DICYCLOMINE HYDROCHLORIDE 20 MG: 20 TABLET ORAL at 09:02

## 2017-10-09 RX ADMIN — MICONAZOLE NITRATE: 20 POWDER TOPICAL at 12:51

## 2017-10-09 RX ADMIN — DICYCLOMINE HYDROCHLORIDE 20 MG: 20 TABLET ORAL at 14:29

## 2017-10-09 RX ADMIN — GABAPENTIN 300 MG: 300 CAPSULE ORAL at 01:52

## 2017-10-09 RX ADMIN — ATORVASTATIN CALCIUM 40 MG: 40 TABLET, FILM COATED ORAL at 01:52

## 2017-10-09 RX ADMIN — KIT FOR THE PREPARATION OF TECHNETIUM TC 99M PENTETATE 1 MILLICURIE: 20 INJECTION, POWDER, LYOPHILIZED, FOR SOLUTION INTRAVENOUS; RESPIRATORY (INHALATION) at 03:15

## 2017-10-09 RX ADMIN — BACTERIOSTATIC WATER: 1 INJECTION, SOLUTION INTRAMUSCULAR; INTRAVENOUS; SUBCUTANEOUS at 10:00

## 2017-10-09 RX ADMIN — CEFTRIAXONE 1 G: 1 INJECTION, POWDER, FOR SOLUTION INTRAMUSCULAR; INTRAVENOUS at 21:15

## 2017-10-09 RX ADMIN — CYANOCOBALAMIN TAB 500 MCG 1000 MCG: 500 TAB at 09:02

## 2017-10-09 RX ADMIN — GABAPENTIN 300 MG: 300 CAPSULE ORAL at 14:29

## 2017-10-09 RX ADMIN — CARVEDILOL 12.5 MG: 12.5 TABLET, FILM COATED ORAL at 09:14

## 2017-10-09 RX ADMIN — VITAMIN D, TAB 1000IU (100/BT) 1000 UNITS: 25 TAB at 09:02

## 2017-10-09 RX ADMIN — CARVEDILOL 12.5 MG: 12.5 TABLET, FILM COATED ORAL at 21:14

## 2017-10-09 RX ADMIN — Medication 5.8 MILLICURIE: at 03:35

## 2017-10-09 RX ADMIN — DICYCLOMINE HYDROCHLORIDE 20 MG: 20 TABLET ORAL at 21:15

## 2017-10-09 RX ADMIN — MICONAZOLE NITRATE: 20 POWDER TOPICAL at 21:15

## 2017-10-09 RX ADMIN — DICYCLOMINE HYDROCHLORIDE 20 MG: 20 TABLET ORAL at 01:52

## 2017-10-09 ASSESSMENT — ENCOUNTER SYMPTOMS
VOMITING: 0
NAUSEA: 0
SHORTNESS OF BREATH: 0
COUGH: 0

## 2017-10-09 ASSESSMENT — PAIN SCALES - GENERAL: PAINLEVEL_OUTOF10: 0

## 2017-10-09 NOTE — PLAN OF CARE
Problem: Skin Integrity:  Goal: Will show no infection signs and symptoms  Will show no infection signs and symptoms  Outcome: Ongoing

## 2017-10-09 NOTE — PROGRESS NOTES
Brandon Hull PA-C   12.5 mg at 10/09/17 0914    clopidogrel (PLAVIX) tablet 75 mg  75 mg Oral Once Nola Prieto PA-C        dicyclomine (BENTYL) tablet 20 mg  20 mg Oral 4 times per day Shanice Adame PA-C   20 mg at 10/09/17 1429    gabapentin (NEURONTIN) capsule 300 mg  300 mg Oral BID Shanice Adame PA-C   300 mg at 10/09/17 1429    insulin lispro (HUMALOG) injection vial 20 Units  20 Units Subcutaneous TID WC Shanice Adame PA-C   Stopped at 10/09/17 1200    insulin glargine (LANTUS) injection vial 50 Units  50 Units Subcutaneous Nightly Shanice Adame PA-C   50 Units at 10/09/17 0152    nitroGLYCERIN (NITROSTAT) SL tablet 0.4 mg  0.4 mg Sublingual Q5 Min PRN Shanice Adame PA-C        miconazole (MICOTIN) 2 % powder   Topical BID Shanice Adame PA-C        oxybutynin (DITROPAN-XL) extended release tablet 10 mg  10 mg Oral BID Shanice Adame PA-C   10 mg at 10/09/17 1432    vitamin B-12 (CYANOCOBALAMIN) tablet 1,000 mcg  1,000 mcg Oral Daily Shanice Adame PA-C   1,000 mcg at 10/09/17 2509    vitamin D (CHOLECALCIFEROL) tablet 1,000 Units  1,000 Units Oral Daily Shanice Adame PA-C   1,000 Units at 10/09/17 0902    insulin lispro (HUMALOG) injection vial 0-12 Units  0-12 Units Subcutaneous TID  Shanice Adame PA-C   Stopped at 10/09/17 1200    insulin lispro (HUMALOG) injection vial 0-6 Units  0-6 Units Subcutaneous Nightly Nola Prieto PA-C        enoxaparin (LOVENOX) injection 100 mg  1 mg/kg Subcutaneous Daily Vinnie Mejias DO         Allergies   Allergen Reactions    Codeine Hives     hives     Active Problems:    * No active hospital problems. *    Blood pressure (!) 101/48, pulse 76, temperature 97.7 °F (36.5 °C), temperature source Oral, resp. rate 16, height 5' 7\" (1.702 m), weight 220 lb 7.4 oz (100 kg), SpO2 100 %, not currently breastfeeding. Subjective:  Symptoms:  She reports malaise and weakness.   No shortness of breath, cough, chest pain, headache or chest of right foot ([address]     Type 2 diabetes mellitus with renal manifestations, controlled ([address] 2015    Insulin dependent    Urinary incontinence due to cognitive impairment 2013    Vitamin D deficiency 2014       Assessment & Plan   1) metabolic encephalopathy 2 to LUCÍA  2) LUCÍA   3) metabolic Acidosis  4) DM with hyperglycemia  5 ) UTI c/w IV abx  6) HTN stable  7) CAD no CP, no SOB    Jenn Rosenthal MD  10/9/2017

## 2017-10-09 NOTE — PROGRESS NOTES
Pt to 224 via cart. Oriented to room, call light phone. Denies pain. Call light in reach.  Will monitor

## 2017-10-09 NOTE — PROGRESS NOTES
This patient is currently in Observation/Out-Patient Status. Due to Medicare, other insurance and Hospital Guidelines, a written order with a therapy specific diagnosis (dysphagia, dysarthria, aphasia) must be attached to the order before we can initiate the evaluation. Re-order speech therapy with the appropriate diagnosis, as needed. Praveen HUDDLESTON notified    Thank You Cinthia TRACY  [facility] M.A.,CCC-SLP  Speech Therapy

## 2017-10-09 NOTE — PROGRESS NOTES
12 Spoke with Dr Brianna Perales. Pt's bp low, pt denies any dizziness/lightheadness, no chest pain or shortness of breath. Told of blood gases. 0000 Dr Brianna Perales at bedside. 0112 new iv in place, bolus started. 0258 BP improved, dr slime. Rate to 125 cc/hr.

## 2017-10-09 NOTE — PROGRESS NOTES
Physical Therapy  Facility/Department: [id number]/[id number]  PT Observation Order Clarification Request:    PT evaluation attempted 10/9/17, at 8:04 AM, however this patient status is currently observation. PT evaluation & treatment order must include specified diagnosis for PT treatment. (i.e. \"PT evaluation & treatment for difficulty walking.) These clarifications may be added into the \"comments\" section of the order.      We thank you for your referral.    [address] [place] [address]) PT Department  Electronically signed by Leyla Duque PT on 10/9/17 at 8:04 AM

## 2017-10-10 ENCOUNTER — APPOINTMENT (OUTPATIENT)
Dept: INTERVENTIONAL RADIOLOGY/VASCULAR | Age: 57
DRG: 682 | End: 2017-10-10
Payer: MEDICARE

## 2017-10-10 LAB
ANION GAP SERPL CALCULATED.3IONS-SCNC: 17 MEQ/L (ref 7–13)
BASOPHILS ABSOLUTE: 0 K/UL (ref 0–0.2)
BASOPHILS RELATIVE PERCENT: 0.4 %
BUN BLDV-MCNC: 99 MG/DL (ref 6–20)
CALCIUM SERPL-MCNC: 9.3 MG/DL (ref 8.6–10.2)
CHLORIDE BLD-SCNC: 113 MEQ/L (ref 98–107)
CO2: 9 MEQ/L (ref 22–29)
CREAT SERPL-MCNC: 3.3 MG/DL (ref 0.5–0.9)
EOSINOPHILS ABSOLUTE: 0.2 K/UL (ref 0–0.7)
EOSINOPHILS RELATIVE PERCENT: 2.2 %
GFR AFRICAN AMERICAN: 17.3
GFR NON-AFRICAN AMERICAN: 14.3
GLUCOSE BLD-MCNC: 116 MG/DL (ref 60–115)
GLUCOSE BLD-MCNC: 146 MG/DL (ref 60–115)
GLUCOSE BLD-MCNC: 160 MG/DL (ref 60–115)
GLUCOSE BLD-MCNC: 167 MG/DL (ref 74–109)
GLUCOSE BLD-MCNC: 192 MG/DL (ref 60–115)
GLUCOSE BLD-MCNC: 194 MG/DL (ref 60–115)
GLUCOSE BLD-MCNC: 307 MG/DL (ref 60–115)
HCT VFR BLD CALC: 28.3 % (ref 37–47)
HEMOGLOBIN: 9.5 G/DL (ref 12–16)
LYMPHOCYTES ABSOLUTE: 2.2 K/UL (ref 1–4.8)
LYMPHOCYTES RELATIVE PERCENT: 21.7 %
MCH RBC QN AUTO: 28.2 PG (ref 27–31.3)
MCHC RBC AUTO-ENTMCNC: 33.7 % (ref 33–37)
MCV RBC AUTO: 83.6 FL (ref 82–100)
MONOCYTES ABSOLUTE: 0.8 K/UL (ref 0.2–0.8)
MONOCYTES RELATIVE PERCENT: 7.3 %
NEUTROPHILS ABSOLUTE: 7.1 K/UL (ref 1.4–6.5)
NEUTROPHILS RELATIVE PERCENT: 68.4 %
PDW BLD-RTO: 14.8 % (ref 11.5–14.5)
PERFORMED ON: ABNORMAL
PLATELET # BLD: 169 K/UL (ref 130–400)
POTASSIUM SERPL-SCNC: 4.1 MEQ/L (ref 3.5–5.1)
RBC # BLD: 3.39 M/UL (ref 4.2–5.4)
SODIUM BLD-SCNC: 139 MEQ/L (ref 132–144)
WBC # BLD: 10.3 K/UL (ref 4.8–10.8)

## 2017-10-10 PROCEDURE — 6360000002 HC RX W HCPCS: Performed by: INTERNAL MEDICINE

## 2017-10-10 PROCEDURE — 02HV33Z INSERTION OF INFUSION DEVICE INTO SUPERIOR VENA CAVA, PERCUTANEOUS APPROACH: ICD-10-PCS | Performed by: RADIOLOGY

## 2017-10-10 PROCEDURE — G9168 MEMORY CURRENT STATUS: HCPCS

## 2017-10-10 PROCEDURE — 92610 EVALUATE SWALLOWING FUNCTION: CPT

## 2017-10-10 PROCEDURE — 36415 COLL VENOUS BLD VENIPUNCTURE: CPT

## 2017-10-10 PROCEDURE — 6360000002 HC RX W HCPCS: Performed by: PHYSICIAN ASSISTANT

## 2017-10-10 PROCEDURE — 36569 INSJ PICC 5 YR+ W/O IMAGING: CPT | Performed by: RADIOLOGY

## 2017-10-10 PROCEDURE — 85025 COMPLETE CBC W/AUTO DIFF WBC: CPT

## 2017-10-10 PROCEDURE — G9170 MEMORY D/C STATUS: HCPCS

## 2017-10-10 PROCEDURE — 1210000000 HC MED SURG R&B

## 2017-10-10 PROCEDURE — 80048 BASIC METABOLIC PNL TOTAL CA: CPT

## 2017-10-10 PROCEDURE — 6370000000 HC RX 637 (ALT 250 FOR IP): Performed by: PHYSICIAN ASSISTANT

## 2017-10-10 PROCEDURE — G8988 SELF CARE GOAL STATUS: HCPCS

## 2017-10-10 PROCEDURE — 92523 SPEECH SOUND LANG COMPREHEN: CPT

## 2017-10-10 PROCEDURE — 97166 OT EVAL MOD COMPLEX 45 MIN: CPT

## 2017-10-10 PROCEDURE — 95816 EEG AWAKE AND DROWSY: CPT

## 2017-10-10 PROCEDURE — 2500000003 HC RX 250 WO HCPCS: Performed by: INTERNAL MEDICINE

## 2017-10-10 PROCEDURE — G9169 MEMORY GOAL STATUS: HCPCS

## 2017-10-10 PROCEDURE — 2580000003 HC RX 258: Performed by: PHYSICIAN ASSISTANT

## 2017-10-10 PROCEDURE — 2580000003 HC RX 258: Performed by: INTERNAL MEDICINE

## 2017-10-10 PROCEDURE — G8997 SWALLOW GOAL STATUS: HCPCS

## 2017-10-10 PROCEDURE — G8996 SWALLOW CURRENT STATUS: HCPCS

## 2017-10-10 PROCEDURE — 77001 FLUOROGUIDE FOR VEIN DEVICE: CPT | Performed by: RADIOLOGY

## 2017-10-10 PROCEDURE — 76937 US GUIDE VASCULAR ACCESS: CPT | Performed by: RADIOLOGY

## 2017-10-10 PROCEDURE — C1751 CATH, INF, PER/CENT/MIDLINE: HCPCS

## 2017-10-10 PROCEDURE — G8987 SELF CARE CURRENT STATUS: HCPCS

## 2017-10-10 RX ADMIN — GABAPENTIN 300 MG: 300 CAPSULE ORAL at 12:00

## 2017-10-10 RX ADMIN — INSULIN LISPRO 8 UNITS: 100 INJECTION, SOLUTION INTRAVENOUS; SUBCUTANEOUS at 19:11

## 2017-10-10 RX ADMIN — SODIUM CHLORIDE, PRESERVATIVE FREE 10 ML: 5 INJECTION INTRAVENOUS at 21:00

## 2017-10-10 RX ADMIN — OXYBUTYNIN CHLORIDE 10 MG: 5 TABLET, EXTENDED RELEASE ORAL at 01:10

## 2017-10-10 RX ADMIN — ASPIRIN 81 MG: 81 TABLET, COATED ORAL at 11:59

## 2017-10-10 RX ADMIN — SODIUM CHLORIDE 150 ML/HR: 9 INJECTION, SOLUTION INTRAVENOUS at 20:42

## 2017-10-10 RX ADMIN — DICYCLOMINE HYDROCHLORIDE 20 MG: 20 TABLET ORAL at 12:00

## 2017-10-10 RX ADMIN — SODIUM BICARBONATE 50 MEQ: 84 INJECTION INTRAVENOUS at 12:23

## 2017-10-10 RX ADMIN — INSULIN GLARGINE 50 UNITS: 100 INJECTION, SOLUTION SUBCUTANEOUS at 21:04

## 2017-10-10 RX ADMIN — GABAPENTIN 300 MG: 300 CAPSULE ORAL at 20:43

## 2017-10-10 RX ADMIN — GABAPENTIN 300 MG: 300 CAPSULE ORAL at 01:10

## 2017-10-10 RX ADMIN — INSULIN GLARGINE 50 UNITS: 100 INJECTION, SOLUTION SUBCUTANEOUS at 01:04

## 2017-10-10 RX ADMIN — LIDOCAINE HYDROCHLORIDE 5 ML: 20 INJECTION, SOLUTION INFILTRATION; PERINEURAL at 09:40

## 2017-10-10 RX ADMIN — ENOXAPARIN SODIUM 100 MG: 100 INJECTION SUBCUTANEOUS at 20:43

## 2017-10-10 RX ADMIN — ARIPIPRAZOLE 10 MG: 10 TABLET ORAL at 11:59

## 2017-10-10 RX ADMIN — VITAMIN D, TAB 1000IU (100/BT) 1000 UNITS: 25 TAB at 12:00

## 2017-10-10 RX ADMIN — DICYCLOMINE HYDROCHLORIDE 20 MG: 20 TABLET ORAL at 01:10

## 2017-10-10 RX ADMIN — MICONAZOLE NITRATE: 20 POWDER TOPICAL at 12:26

## 2017-10-10 RX ADMIN — CEFTRIAXONE 1 G: 1 INJECTION, POWDER, FOR SOLUTION INTRAMUSCULAR; INTRAVENOUS at 20:43

## 2017-10-10 RX ADMIN — CARVEDILOL 12.5 MG: 12.5 TABLET, FILM COATED ORAL at 21:37

## 2017-10-10 RX ADMIN — CARVEDILOL 12.5 MG: 12.5 TABLET, FILM COATED ORAL at 12:00

## 2017-10-10 RX ADMIN — DICYCLOMINE HYDROCHLORIDE 20 MG: 20 TABLET ORAL at 20:44

## 2017-10-10 RX ADMIN — SODIUM CHLORIDE 250 ML: 9 INJECTION, SOLUTION INTRAVENOUS at 09:44

## 2017-10-10 RX ADMIN — CYANOCOBALAMIN TAB 500 MCG 1000 MCG: 500 TAB at 11:59

## 2017-10-10 RX ADMIN — MICONAZOLE NITRATE 2 %: 20 POWDER TOPICAL at 20:45

## 2017-10-10 RX ADMIN — ATORVASTATIN CALCIUM 40 MG: 40 TABLET, FILM COATED ORAL at 20:43

## 2017-10-10 RX ADMIN — ATORVASTATIN CALCIUM 40 MG: 40 TABLET, FILM COATED ORAL at 01:11

## 2017-10-10 RX ADMIN — OXYBUTYNIN CHLORIDE 10 MG: 5 TABLET, EXTENDED RELEASE ORAL at 20:44

## 2017-10-10 ASSESSMENT — PAIN SCALES - GENERAL
PAINLEVEL_OUTOF10: 0

## 2017-10-10 NOTE — PLAN OF CARE
Problem: IP COMMUNICATION/DYSARTHRIA  Goal: LTG - patient will improve expressive language skills to allow for communication of wants and needs in daily activities  Outcome: Met This Shift  SLE completed.

## 2017-10-10 NOTE — PROGRESS NOTES
swallow       THERAPY RECOMMENDATIONS:   []  Therapy is not recommended     [x]  Therapy is recommended to:    []  Improve oral motor strength and range of motion    []  Improve tongue base retraction     []  Improve laryngeal strength and range of motion     [x]  Mealtime assessment of patient's tolerance of prescribed diet     []  Repeat bedside swallow study in    []  Modified Barium Swallow (MBS) is recommended and requires a Physician order    [x]  Terese Malloy RN notified                 DIET LEVEL PRIOR TO THIS EVALUATION :    DIET CARB CONTROL; Carb Control: 4 carbs/meal (approximate 1800 kcals/day)    CURRENT RESPIRATORY STATUS:   [x]Room Air  []Nasal Canula []O2  Mask  []Non Re-Breather    []Ventilator  []BiPAP  []Tracheostomy with Room Air []Tracheostomy with O2        CURRENT COGNITIVE STATUS:   [x] Alert    []Responsive       []Non-responsive   []Confused  [x] Cooperative     []Uncooperative    []Aphasic       []Impulsive              PROCEDURE   Consistencies Administered During the Evaluation   Liquids: [x]  Thin    []  Nectar   []  Honey  Solids:  [x]  Pureed/Pudding   []  Soft Solid   [x]  Lylia Julianna Cracker    []Moistened Toothette   []Other:      Method of Intake:   [x]  Cup    []  Spoon  []  Straw    [x]  Self Fed    []Set-Up     []  Fed by Clinician     Position:   []  Upright seated ([]In bed [] in chair)   [x]  Reclined upright in bed                     ORAL MECHANISM EXAMINATION  [x] Adequate lingual/labial strength  [] Generalized oral weakness  [] Left labiobuccal weakness   [] Right labiobuccal weakness  [] Left lingual deviation   [] Right lingual deviation  [] Oral apraxia    [] CNT  [] DNT    RESULTS   Oral Stage:   []  WNL []  WFL [x]  Exceptions     [x]  Dentition: []  natural  []  missing teeth  [x]  edentulous  []  partials  [] dentures     []  Inadequate labial seal resulting anterior labial spillage from:   [] right  [] left  [] midline     [x]  Decreased mastication due to:     [x]

## 2017-10-10 NOTE — HOME CARE
Current with [facility] for SN services. Sticky note already on chart for physician. Will monitor discharge plan. Electronically signed by Natalya Jon RN on 10/10/17 at 4:22 PM

## 2017-10-10 NOTE — PROGRESS NOTES
10/10/17 Unable to discharge today renal status no great improvement.  BP better today low on admission with baseline level 35 cc/min  Heart regular  Lungs clear

## 2017-10-10 NOTE — PLAN OF CARE
Problem: IP COMMUNICATION/DYSARTHRIA  Goal: LTG - Patient will improve receptive language skills to allow for completion of daily activities  Outcome: Met This Shift  SLE completed.

## 2017-10-10 NOTE — PLAN OF CARE
Problem: IP SWALLOWING  Goal: LTG - patient will tolerate the least restrictive diet consistency to allow for safe consumption of daily meals  Outcome: Ongoing  BSE completed.

## 2017-10-10 NOTE — CARE COORDINATION
10/10/17 I MET WITH THE PATIENT. SHE STATED SHE LIVES ALONE AND USES A WALKER. [facility] AND WOULD LIKE TO CONTINUE WITH SAME. MATTY MANCILLA RN FOR [facility] AWARE. WE DO NEED A RESUME [facility] ORDER. STICKY NOTE LEFT FOR THE  CC3/SW TO F/U.

## 2017-10-10 NOTE — PROGRESS NOTES
SPEECH/LANGUAGE PATHOLOGY  SPEECH/LANGUAGE/COGNITIVE EVALUATION      PATIENT NAME:  Heather Gamboa      :  1960      TODAY'S DATE:  10/10/2017  ROOM: [place] [id number]     ADMITTING DIAGNOSIS: Syncope and collapse [R55]  LUCÍA (acute kidney injury) ([facility] [N17.9]     ACTIVE PROBLEM LIST:   Patient Active Problem List   Diagnosis    CAD (coronary artery disease)    Schizophrenia, paranoid, chronic    Metabolic syndrome    Obesity    Vitamin B 12 deficiency    Cerebral microvascular disease    Mixed hyperlipidemia    Other hammer toe (acquired)    Vitamin D insufficiency    Incontinence of urine    Diabetic nephropathy with proteinuria ([facility]    HTN (hypertension)    History of type C viral hepatitis    [facility]    Urinary incontinence due to cognitive impairment    Impaired mobility and activities of daily living due OA s/p Rt total knee replacement, [facility] admit 16    Status post total knee replacement, right    History of seizures    Renal insufficiency    Stented coronary artery-plan is to stay on Plavix indefinately per Dr Maxx Martinez    Controlled type 2 diabetes mellitus with diabetic neuropathy, with long-term current use of insulin (HCC)    Hemiparesis, left (HCC)       TIME IN: 1358   TIME OUT: 1414    SPEECH PATHOLOGY DIAGNOSIS:    Mild cognitive linguistic deficits characterized by memory deficits, pt is at baseline level of function     THERAPY RECOMMENDATIONS:   [x]Speech Pathology intervention is not warranted at this time.    []Speech Pathology intervention is recommended with emphasis on the following:                 MOTOR SPEECH    Oral Peripheral Examination   [x]Adequate lingual/labial strength   []Generalized oral weakness   []Right labiobuccal weakness   []Left labiobuccal weakness   []Right lingual deviation    []Left lingual deviation   []Inadequate velopharyngeal closure  []Oral apraxia      []CNT    Parameters of Speech Production  Respiration:  [x]WFL []SOB []Inadequate for speech production  Articulation:  [x]WFL []Distortions []Anticipatory struggle []CNT  Resonance:  [x]WFL []Hypernasal  []Hyponasal  []Nasal emission []CNT  Quality:   []WFL [x]Hoarse []Harsh []Strained [] Breathiness []CNT  Pitch:    [x]WFL []High []Low []CNT  Intensity: [x]WFL []Loud []Quiet []CNT  Fluency:  [x]Intact []Dysfluent []CNT  Prosody [x]Intact []Monotone []Irregular fluctuation      RECEPTIVE LANGUAGE    Comprehension of Yes/No Questions:   [x]WNL    []Incomplete []Latent    []Inconsistent   []Perseveration []Cueing   []Unable    []CNT    Process  Simple Verbal Commands:   [x]WNL     []Incomplete  []Latent   []Inconsistent  []Perseveration  []Cueing      []Unable []CNT    Process Intermediate Verbal Commands:   [x]WNL        []Incomplete[]Latent           []Inconsistent  []Perseveration    []Cueing      []Unable          []CNT    Process Complex Verbal Commands:   [x]WNL        []Incomplete []Latent           []Inconsistent  []Perseveration    []Cueing       []Unable          []CNT     Comprehension of Conversation:     [x] WNL        []Incomplete []Latent  []Inconsistent    []Perseveration  []Cueing     []Unable []CNT        EXPRESSIVE LANGUAGE   Automatics: [x]Functional [] Impaired  [] Not Tested    Imitation:  Words:         [x]Functional [] Impaired  []Not Tested    Sentences:  [x]Functional [x] Impaired  [] Not Tested   Comments: Impaired for longer sentences, per pt \"my memory hasn't good the past few years\"    Naming:  Modality used: [x]Verbal        []Written   Confrontation Naming: [x]Functional  [] Impaired  [] Not Tested  Functional Description: [x]Functional [] Impaired   [] Not Tested  Response Naming:       [x]Functional [] Impaired   [] Not Tested    Conversation:     [x]Grammatical form: [x] Intact []Disordered [] Not Tested   []Paraphasias: []Literal errors []Semantic errors [] Neologistic errors   []Running jargon []Anomia COGNITION   Attention/Orientation  Attention: [x]Sustained attention []Easily distracted [] Not Tested  Orientation:  [x]Person  [x]Place [x]Date [x]Reason  for hospitalization    Memory   Biographical:   [x]Birthdate  [x] Age    Delayed recall:  [x]Meghan  [x]Sweater  [x]Toast []Did not recall  [] Not Tested  Comment: Meghan: cued, Sweater & Toast: Given choice of 2    Organization/Problem Solving/Reasoning   Sequencing:   [x]Verbal [x] Functional [] Impaired  [] Not Tested    Problem solving:    [x]Verbal task [x] Functional [] Impaired  [] Not Tested   Mathematics:   []Simple arithmetic: []Functional [] Impaired  [x] Not Tested   []Function based: []Functional  [] Impaired  [x] Not Tested    Pain:noneN/A  [] Nursing notified     [x]  Speech therapy not warranted at this time- discussed with: [x]  patient/  []  family. The [x]  patient/ []  family understands the diagnosis, prognosis and plan of care. Safety Devices:  [x] Call light within reach [] Chair alarm activated  [x] Bed alarm activated    [x]The admitting diagnosis and active problem list, as listed below have been reviewed prior to initiation of this evaluation.     Electronically signed by ANN MARIE Monteiro on 10/10/2017 at 2:23 PM      Speech Current  CI   Speech Goal  CI   Speech Discharge  CI     Severity Levels  CH - 0% Impaired or limited  CI - At least 1%, but less than 20%  CJ - At least 20%, but less than 40%  CK - At least 40%, but less than 60%  CL - At least 60%, but less than 80%  CM - At least 80%, but less than 100%  CN - 100% impaired or limited    Electronically signed by ANN MARIE Monteiro on 10/10/2017 at 2:29 PM

## 2017-10-10 NOTE — PROGRESS NOTES
Shweta Natarajan is a 59 y.o. female patient.  Pt was seen and evaluated, more alert, feeling better compared to yesterday    Current Facility-Administered Medications   Medication Dose Route Frequency Provider Last Rate Last Dose    sodium bicarbonate 8.4 % injection 50 mEq  50 mEq Intravenous Once Kelby Chacko MD        sodium chloride flush 0.9 % injection 10 mL  10 mL Intravenous PRN Lelon Favor, PA-C   10 mL at 10/09/17 0335    0.9 % sodium chloride infusion   Intravenous Continuous Kelby Chacko  mL/hr at 10/09/17 2114      lidocaine 2 % injection 5 mL  5 mL Intradermal Once Burnadette Elisabeth, DO        sodium chloride flush 0.9 % injection 10 mL  10 mL Intravenous 2 times per day Burnadette Elisabeth, DO        sodium chloride flush 0.9 % injection 10 mL  10 mL Intravenous PRN Burnadette Elisabeth, DO        0.9 % sodium chloride infusion  250 mL Intravenous Once Burnadette Elisabeth, DO        sodium chloride flush 0.9 % injection 10 mL  10 mL Intravenous 2 times per day Lelon Favor, PA-C   10 mL at 10/08/17 2246    sodium chloride flush 0.9 % injection 10 mL  10 mL Intravenous PRN Lelon Favor, PA-C        acetaminophen (TYLENOL) tablet 650 mg  650 mg Oral Q4H PRN Lelon Favor, PA-C        magnesium hydroxide (MILK OF MAGNESIA) 400 MG/5ML suspension 30 mL  30 mL Oral Daily PRN Lelon Favor, PA-C        ondansetron (ZOFRAN) injection 4 mg  4 mg Intravenous Q6H PRN Lelon Favor, PA-C        cefTRIAXone (ROCEPHIN) 1 g IVPB in 50 mL D5W minibag  1 g Intravenous Q24H Lelon Favor, PA-C   Stopped at 10/09/17 2145    aspirin EC tablet 81 mg  81 mg Oral Daily Lelon Favor, PA-C   81 mg at 10/09/17 0902    ARIPiprazole (ABILIFY) tablet 10 mg  10 mg Oral Daily Lelon Favor, PA-C   10 mg at 10/09/17 1273    atorvastatin (LIPITOR) tablet 40 mg  40 mg Oral Nightly Lelon Favor, PA-C   40 mg at 10/10/17 0111    carvedilol (COREG) tablet 12.5 mg  12.5 mg Oral BID Lelon Favor, right foot ([facility]     Type 2 diabetes mellitus with renal manifestations, controlled ([address] 2015    Insulin dependent    Urinary incontinence due to cognitive impairment 2013    Vitamin D deficiency 2014       Assessment & Plan   syncope  1) metabolic encephalopathy 2 to LUCÍA  Better more alert  2) LUCÍA   3) metabolic Acidosis  C/w bicarb  4) DM with hyperglycemia  Better c.w SSI  5 ) UTI c/w IV abx  6) HTN stable  7) CAD no CP, no SOB    Shy Hernández MD  10/10/2017

## 2017-10-10 NOTE — CONSULTS
Consult to Neurology  Consult performed by: Lindsey Alicia ordered by: Marcia Barnett      Gait Ataxia  PN  Myopathy  Encephalopathy this afternoon , completely awake, question catatonia  SVID, causing gait ataxia and PN  EEG  Overnight pulse oxymetry
There is no pedal edema. There is no  cyanosis or clubbing. NECK:  Supple. There are no meningeal signs. CARDIOVASCULAR SYSTEM:  S1 and S2 are normal.  No murmurs appreciated. No carotid bruits. RESPIRATORY SYSTEM:  Clear to auscultation. CNS:  She is awake, alert, and oriented to self, place, and month. Pupils are equal and reactive. Eye movements are conjugate. There is  no facial asymmetry. Speech is normal.  Tongue is midline. Palate  moves symmetrically. EXTREMITIES:  Examination of her extremities reveals no pronator  drift. Fine finger movements are symmetrical.  Strength is 4+/5. Reflexes are 2+. There are no sensory levels. There is a central  ataxia. LABORATORY DATA:  Lab examination is therefore reviewed. MRI of the  brain shows small vessel ischemic changes, mostly periventricular. Her white count is reviewed. Ammonia level 35. Sodium 136, potassium  4.0, BUN of 114, creatinine 4.31. White count 11.5, hemoglobin 9.7,  hematocrit 29.7, and platelets of 817,311. IMPRESSION:  Gait ataxia with a few falls of recent. The patient has  a combination of small vessel ischemic changes and mild myopathy with  peripheral neuropathy. This may be related to small vessel ischemic  changes. History of seizure, which was secondary to hyperosmolar state, she is  on medications. Small vessel ischemic changes causing gait ataxia as well as minor  memory issues. Syncopal episodes, which are vasovagal.  No orthostasis is reported. Carotid ultrasounds are normal.  We will follow her up with an EEG. Thank you Dr. Michelle Langford for asking us to see the patient.         Portia Osei MD    D: 10/09/2017 19:47:53       T: 10/09/2017 21:54:34     [email or web address]  Job#: [id number]     Doc#: [id number]
poor oral intake. 4.  Neuropathy. 5.  Diabetes mellitus type 2.  6.  Hypertension. 7.  GERD. 8.  Obesity. 9.  History of hepatitis C. PLAN:  IV fluids. Obtain urine for albumin/creatinine ratio. Avoid  hypotension and nephrotoxic exposure, follow laboratory data, do  bladder scan to ensure no retention.         Marin Jean Baptiste DO    D: 10/09/2017 15:31:32       T: 10/09/2017 15:36:27     [email or web address]  Job#: [id number]     Doc#: [id number]

## 2017-10-11 LAB
ANION GAP SERPL CALCULATED.3IONS-SCNC: 13 MEQ/L (ref 7–13)
BUN BLDV-MCNC: 57 MG/DL (ref 6–20)
CALCIUM SERPL-MCNC: 6.7 MG/DL (ref 8.6–10.2)
CHLORIDE BLD-SCNC: 121 MEQ/L (ref 98–107)
CO2: 12 MEQ/L (ref 22–29)
CREAT SERPL-MCNC: 1.63 MG/DL (ref 0.5–0.9)
GFR AFRICAN AMERICAN: 39
GFR NON-AFRICAN AMERICAN: 32.2
GLUCOSE BLD-MCNC: 101 MG/DL (ref 60–115)
GLUCOSE BLD-MCNC: 198 MG/DL (ref 60–115)
GLUCOSE BLD-MCNC: 230 MG/DL (ref 60–115)
GLUCOSE BLD-MCNC: 71 MG/DL (ref 74–109)
ORGANISM: ABNORMAL
PERFORMED ON: ABNORMAL
PERFORMED ON: ABNORMAL
PERFORMED ON: NORMAL
POTASSIUM SERPL-SCNC: 3 MEQ/L (ref 3.5–5.1)
SODIUM BLD-SCNC: 146 MEQ/L (ref 132–144)
URINE CULTURE, ROUTINE: ABNORMAL
URINE CULTURE, ROUTINE: ABNORMAL

## 2017-10-11 PROCEDURE — 97116 GAIT TRAINING THERAPY: CPT

## 2017-10-11 PROCEDURE — 2580000003 HC RX 258: Performed by: INTERNAL MEDICINE

## 2017-10-11 PROCEDURE — 2580000003 HC RX 258: Performed by: PHYSICIAN ASSISTANT

## 2017-10-11 PROCEDURE — 6360000002 HC RX W HCPCS: Performed by: INTERNAL MEDICINE

## 2017-10-11 PROCEDURE — 2500000003 HC RX 250 WO HCPCS: Performed by: INTERNAL MEDICINE

## 2017-10-11 PROCEDURE — G8979 MOBILITY GOAL STATUS: HCPCS

## 2017-10-11 PROCEDURE — 1210000000 HC MED SURG R&B

## 2017-10-11 PROCEDURE — 6360000002 HC RX W HCPCS: Performed by: PHYSICIAN ASSISTANT

## 2017-10-11 PROCEDURE — 97162 PT EVAL MOD COMPLEX 30 MIN: CPT

## 2017-10-11 PROCEDURE — G8978 MOBILITY CURRENT STATUS: HCPCS

## 2017-10-11 PROCEDURE — 6370000000 HC RX 637 (ALT 250 FOR IP): Performed by: INTERNAL MEDICINE

## 2017-10-11 PROCEDURE — 6370000000 HC RX 637 (ALT 250 FOR IP): Performed by: PHYSICIAN ASSISTANT

## 2017-10-11 PROCEDURE — 80048 BASIC METABOLIC PNL TOTAL CA: CPT

## 2017-10-11 RX ORDER — DEXTROSE MONOHYDRATE 50 MG/ML
100 INJECTION, SOLUTION INTRAVENOUS PRN
Status: DISCONTINUED | OUTPATIENT
Start: 2017-10-11 | End: 2017-10-13 | Stop reason: HOSPADM

## 2017-10-11 RX ORDER — POTASSIUM CHLORIDE 20 MEQ/1
40 TABLET, EXTENDED RELEASE ORAL 2 TIMES DAILY WITH MEALS
Status: DISCONTINUED | OUTPATIENT
Start: 2017-10-11 | End: 2017-10-13 | Stop reason: HOSPADM

## 2017-10-11 RX ORDER — POTASSIUM CHLORIDE 20 MEQ/1
40 TABLET, EXTENDED RELEASE ORAL ONCE
Status: DISCONTINUED | OUTPATIENT
Start: 2017-10-11 | End: 2017-10-11

## 2017-10-11 RX ORDER — DEXTROSE MONOHYDRATE 25 G/50ML
12.5 INJECTION, SOLUTION INTRAVENOUS PRN
Status: DISCONTINUED | OUTPATIENT
Start: 2017-10-11 | End: 2017-10-13 | Stop reason: HOSPADM

## 2017-10-11 RX ORDER — NICOTINE POLACRILEX 4 MG
15 LOZENGE BUCCAL PRN
Status: DISCONTINUED | OUTPATIENT
Start: 2017-10-11 | End: 2017-10-13 | Stop reason: HOSPADM

## 2017-10-11 RX ADMIN — Medication 10 ML: at 21:48

## 2017-10-11 RX ADMIN — INSULIN GLARGINE 50 UNITS: 100 INJECTION, SOLUTION SUBCUTANEOUS at 21:19

## 2017-10-11 RX ADMIN — Medication 10 ML: at 08:15

## 2017-10-11 RX ADMIN — CARVEDILOL 12.5 MG: 12.5 TABLET, FILM COATED ORAL at 08:16

## 2017-10-11 RX ADMIN — ENOXAPARIN SODIUM 100 MG: 100 INJECTION SUBCUTANEOUS at 21:20

## 2017-10-11 RX ADMIN — POTASSIUM CHLORIDE 40 MEQ: 20 TABLET, EXTENDED RELEASE ORAL at 18:38

## 2017-10-11 RX ADMIN — DICYCLOMINE HYDROCHLORIDE 20 MG: 20 TABLET ORAL at 01:28

## 2017-10-11 RX ADMIN — VITAMIN D, TAB 1000IU (100/BT) 1000 UNITS: 25 TAB at 08:16

## 2017-10-11 RX ADMIN — CEFTRIAXONE 1 G: 1 INJECTION, POWDER, FOR SOLUTION INTRAMUSCULAR; INTRAVENOUS at 21:12

## 2017-10-11 RX ADMIN — CYANOCOBALAMIN TAB 500 MCG 1000 MCG: 500 TAB at 08:16

## 2017-10-11 RX ADMIN — ASPIRIN 81 MG: 81 TABLET, COATED ORAL at 08:16

## 2017-10-11 RX ADMIN — SODIUM CHLORIDE, PRESERVATIVE FREE 10 ML: 5 INJECTION INTRAVENOUS at 21:46

## 2017-10-11 RX ADMIN — DICYCLOMINE HYDROCHLORIDE 20 MG: 20 TABLET ORAL at 08:16

## 2017-10-11 RX ADMIN — GABAPENTIN 300 MG: 300 CAPSULE ORAL at 21:09

## 2017-10-11 RX ADMIN — Medication: at 22:29

## 2017-10-11 RX ADMIN — INSULIN LISPRO 4 UNITS: 100 INJECTION, SOLUTION INTRAVENOUS; SUBCUTANEOUS at 13:06

## 2017-10-11 RX ADMIN — ATORVASTATIN CALCIUM 40 MG: 40 TABLET, FILM COATED ORAL at 21:09

## 2017-10-11 RX ADMIN — CALCIUM GLUCONATE 2 G: 94 INJECTION, SOLUTION INTRAVENOUS at 11:52

## 2017-10-11 RX ADMIN — MICONAZOLE NITRATE: 20 POWDER TOPICAL at 08:19

## 2017-10-11 RX ADMIN — SODIUM CHLORIDE, PRESERVATIVE FREE 10 ML: 5 INJECTION INTRAVENOUS at 21:47

## 2017-10-11 RX ADMIN — Medication: at 11:52

## 2017-10-11 RX ADMIN — GABAPENTIN 300 MG: 300 CAPSULE ORAL at 08:16

## 2017-10-11 RX ADMIN — OXYBUTYNIN CHLORIDE 10 MG: 5 TABLET, EXTENDED RELEASE ORAL at 08:16

## 2017-10-11 RX ADMIN — DICYCLOMINE HYDROCHLORIDE 20 MG: 20 TABLET ORAL at 21:09

## 2017-10-11 RX ADMIN — CARVEDILOL 12.5 MG: 12.5 TABLET, FILM COATED ORAL at 21:09

## 2017-10-11 RX ADMIN — MICONAZOLE NITRATE: 20 POWDER TOPICAL at 21:09

## 2017-10-11 RX ADMIN — ARIPIPRAZOLE 10 MG: 10 TABLET ORAL at 08:16

## 2017-10-11 RX ADMIN — SODIUM CHLORIDE 150 ML/HR: 9 INJECTION, SOLUTION INTRAVENOUS at 04:13

## 2017-10-11 RX ADMIN — POTASSIUM CHLORIDE 40 MEQ: 20 TABLET, EXTENDED RELEASE ORAL at 09:22

## 2017-10-11 RX ADMIN — OXYBUTYNIN CHLORIDE 10 MG: 5 TABLET, EXTENDED RELEASE ORAL at 21:09

## 2017-10-11 RX ADMIN — DICYCLOMINE HYDROCHLORIDE 20 MG: 20 TABLET ORAL at 13:36

## 2017-10-11 ASSESSMENT — PAIN SCALES - GENERAL
PAINLEVEL_OUTOF10: 0

## 2017-10-11 NOTE — PROGRESS NOTES
Pt continues On IV ATB from UTI, LUCÍA, no adverse reactions noted. Pt denies pelvic and flank pain at this time. Pt continues on IV NS at 150 cc/hr. Pt has weak trunk control and leans to the left side while sitting on the side of the bed to dangle, requiring verbal cues for safety, and although pt has not been up to ambulate this shift with her inability to sit up probability to ambulate would be unsteady, and not safe. Pt is able to verbalize her needs, however at the start of this shift pt was incontinent of a large amount of urine soaking through her depends and noted food from last meal in the bed on her her gown. Pt required moderate assist times one for a bed bath and bed linen change. Pt understands why she is here and plan of care. Call light in reach no further concerns at this time.

## 2017-10-11 NOTE — PLAN OF CARE
Problem: Mobility - Impaired:  Goal: Mobility will improve  Mobility will improve  Outcome: Ongoing  Therapy evaluation completed. Please see daily notes and/or progress notes for details related to planned treatment interventions, goals and functional abilities.

## 2017-10-11 NOTE — PROGRESS NOTES
Jose Angel Lauren is a 59 y.o. female patient.  Pt was seen and evaluated, feeling a lot better    Current Facility-Administered Medications   Medication Dose Route Frequency Provider Last Rate Last Dose    glucose (GLUTOSE) 40 % oral gel 15 g  15 g Oral PRN Diann Maya MD        dextrose 50 % solution 12.5 g  12.5 g Intravenous PRN Diann Maya MD        glucagon (rDNA) injection 1 mg  1 mg Intramuscular PRN Diann Maya MD        dextrose 5 % solution  100 mL/hr Intravenous PRN Diann Maya MD        sodium bicarbonate 50 mEq in dextrose 5 % and 0.2 % NaCl 1,000 mL infusion   Intravenous Continuous Dinan Maya MD        potassium chloride (KLOR-CON M) extended release tablet 40 mEq  40 mEq Oral BID WC Diann Maya MD        calcium gluconate 2 g in dextrose 5 % 100 mL IVPB  2 g Intravenous Once Diann Maya MD        sodium chloride flush 0.9 % injection 10 mL  10 mL Intravenous PRN Anders Jacobs PA-C   10 mL at 10/09/17 0335    sodium chloride flush 0.9 % injection 10 mL  10 mL Intravenous 2 times per day Wong Pedersen DO   10 mL at 10/10/17 2100    sodium chloride flush 0.9 % injection 10 mL  10 mL Intravenous PRN Wong Pedersen DO        sodium chloride flush 0.9 % injection 10 mL  10 mL Intravenous 2 times per day Anders Jacobs PA-C   10 mL at 10/11/17 0815    sodium chloride flush 0.9 % injection 10 mL  10 mL Intravenous PRN Anders Jacobs PA-C        acetaminophen (TYLENOL) tablet 650 mg  650 mg Oral Q4H PRN Anders Jacobs PA-C        magnesium hydroxide (MILK OF MAGNESIA) 400 MG/5ML suspension 30 mL  30 mL Oral Daily PRN Anders Jacobs PA-C        ondansetron [facility]) injection 4 mg  4 mg Intravenous Q6H PRN Anders Jacobs PA-C        cefTRIAXone (ROCEPHIN) 1 g IVPB in 50 mL D5W minibag  1 g Intravenous Q24H Anders Jacobs PA-C   Stopped at 10/10/17 2113    aspirin EC tablet 81 mg  81 mg Oral Daily Anders Jacobs PA-C   81 mg at 10/11/17 0816    ARIPiprazole (ABILIFY) tablet 10 mg  10 mg Oral Daily Monserrat Ocasio PA-C   10 mg at 10/11/17 0816    atorvastatin (LIPITOR) tablet 40 mg  40 mg Oral Nightly Monserrat Ocasio PA-C   40 mg at 10/10/17 2043    carvedilol (COREG) tablet 12.5 mg  12.5 mg Oral BID Monserrat Ocasio PA-C   12.5 mg at 10/11/17 1742    clopidogrel (PLAVIX) tablet 75 mg  75 mg Oral Once Monserrat Ocasio PA-C        dicyclomine (BENTYL) tablet 20 mg  20 mg Oral 4 times per day Monserrat Ocasio PA-C   20 mg at 10/11/17 0816    gabapentin (NEURONTIN) capsule 300 mg  300 mg Oral BID Monserrat Ocasio PA-C   300 mg at 10/11/17 0816    insulin lispro (HUMALOG) injection vial 20 Units  20 Units Subcutaneous TID  Monserrat Ocasio PA-C   20 Units at 10/10/17 1914    insulin glargine (LANTUS) injection vial 50 Units  50 Units Subcutaneous Nightly Monserrat Ocasio PA-C   50 Units at 10/10/17 2104    nitroGLYCERIN (NITROSTAT) SL tablet 0.4 mg  0.4 mg Sublingual Q5 Min PRN Monserrat Ocasio PA-C        miconazole (MICOTIN) 2 % powder   Topical BID Monserrat Ocasio PA-C        oxybutynin (DITROPAN-XL) extended release tablet 10 mg  10 mg Oral BID Monserrat Ocasio PA-C   10 mg at 10/11/17 0940    vitamin B-12 (CYANOCOBALAMIN) tablet 1,000 mcg  1,000 mcg Oral Daily Monserrat Ocasio PA-C   1,000 mcg at 10/11/17 9565    vitamin D (CHOLECALCIFEROL) tablet 1,000 Units  1,000 Units Oral Daily Monserrat Ocasio PA-C   1,000 Units at 10/11/17 0816    insulin lispro (HUMALOG) injection vial 0-12 Units  0-12 Units Subcutaneous TID  Monserrat Ocasio PA-C   8 Units at 10/10/17 1911    insulin lispro (HUMALOG) injection vial 0-6 Units  0-6 Units Subcutaneous Nightly Nola Prieto PA-C        enoxaparin (LOVENOX) injection 100 mg  1 mg/kg Subcutaneous Daily Radha Fredy Mejias DO   100 mg at 10/10/17 2043     Allergies   Allergen Reactions    Codeine Hives     hives     Active Problems:    * No active hospital problems.  *    Blood pressure (!) 150/71, pulse 71,

## 2017-10-11 NOTE — PLAN OF CARE
Problem: Falls - Risk of  Goal: Absence of falls  Outcome: Ongoing      Problem: Pain:  Goal: Pain level will decrease  Pain level will decrease   Outcome: Ongoing    Goal: Control of acute pain  Control of acute pain   Outcome: Ongoing    Goal: Control of chronic pain  Control of chronic pain   Outcome: Ongoing      Problem: Skin Integrity:  Goal: Will show no infection signs and symptoms  Will show no infection signs and symptoms   Outcome: Ongoing    Goal: Absence of new skin breakdown  Absence of new skin breakdown   Outcome: Ongoing      Problem: IP SWALLOWING  Goal: LTG - patient will tolerate the least restrictive diet consistency to allow for safe consumption of daily meals  Outcome: Ongoing      Problem: IP COMMUNICATION/DYSARTHRIA  Goal: LTG - patient will improve expressive language skills to allow for communication of wants and needs in daily activities  Outcome: Ongoing      Problem: IP BALANCE  Goal: LTG - patient will maintain standing balance to allow for completion of daily activities  Outcome: Ongoing

## 2017-10-11 NOTE — PROGRESS NOTES
Nutrition Assessment    Type and Reason for Visit: Initial, Positive Nutrition Screen    Nutrition Recommendations: Continue current diet    Malnutrition Assessment:  · Malnutrition Status: No malnutrition    Nutrition Diagnosis:   · Problem: Unintended weight loss  · Etiology: related to  (unknown etiology)     Signs and symptoms:  as evidenced by Weight loss noted    Nutrition Assessment:  · Subjective Assessment: Pt states appetite good/no nutritional complaints. She states that she had lost ~35 lbs over last~6 months, but was wanting to lose weight and was walking more for exercise/states no change in appetite. · Wound Type:  (excoriation bilateral groin and abdominal folds)  · Current Nutrition Therapies:  · Oral Diet Orders: Carb Control 4 Carbs/Meal   · Oral Diet intake: %  · Oral Nutrition Supplement (ONS) Orders: None  · Anthropometric Measures:  · Ht: 5' 7\" (170.2 cm)   · Admission Body Wt: 220 lb (99.8 kg) (10-8)  · Usual Body Wt: 260 lb (117.9 kg) (noted on 2-24; 255lb on 3/30)  · % Weight Change: 15%,  ~8months  · Ideal Body Wt: 135 lb (61.2 kg), % Ideal Body 163%  · BMI Classification: BMI 30.0 - 34.9 Obese Class I  · Comparative Standards (Estimated Nutrition Needs):  · Estimated Daily Total Kcal: 4942-4361  · Estimated Daily Protein (g): 50-61    Estimated Intake vs Estimated Needs: Intake Meets Needs    Nutrition Risk Level: Moderate    Nutrition Interventions:   Continue current diet  Continued Inpatient Monitoring (Pt states previously instructed on diabetic diet)    Nutrition Evaluation:   · Evaluation: Goals set   · Goals: Intake >75% of meals. Stable weight    · Monitoring: Meal Intake, Weight, Pertinent Labs    See Adult Nutrition Doc Flowsheet for more detail.      Electronically signed by Deondre Woodall RD, LD on 10/11/17 at 3:52 PM

## 2017-10-11 NOTE — PROGRESS NOTES
10/11/17  Suspected patient GFR would improve and now at her baseline.   Heart regular  Lungs clear    Much improved

## 2017-10-11 NOTE — PLAN OF CARE
Problem: Nutrition  Goal: Optimal nutrition therapy  Outcome: Ongoing  Nutrition Problem: Unintended weight loss  Intervention: Food and/or Nutrient Delivery: Continue current diet  Nutritional Goals: Intake >75% of meals.  Stable weight

## 2017-10-11 NOTE — PROGRESS NOTES
(gastroesophageal reflux disease)     Hemiparesis, left ([facility] [address] 2013    entered Assisted Living ([facility])    History of seizures     History of type C viral hepatitis     HTN (hypertension)     Hyperlipidemia     Impaired mobility and activities of daily living     Mediastinal lymphadenopathy     Dr Lauren Jacobs, Elane Harm    Metabolic syndrome     Neurogenic urinary incontinence     Obesity (BMI 30-39. 9)     Recurrent UTI     Renal insufficiency     S/P colonoscopy     [facility], focal active colitis    Schizophrenia, paranoid, chronic ([facility] [address]     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 Lacy Marking      x1 Dr. Adali Odom, TOTAL ABDOMINAL      one ovary intact, Dr Juli Zambrano, menorrhagia    TOE AMPUTATION Right     TOTAL KNEE ARTHROPLASTY  16    Dr Judy Mukherjee       Chart Reviewed: Yes  Patient assessed for rehabilitation services?: Yes  Family / Caregiver Present: No    Restrictions:  Restrictions/Precautions: Fall Risk  Body mass index is 38.02 kg/m². SUBJECTIVE: Subjective: \"I am feeling better than earlier. \"       Post Treatment Pain Screening:   Pain Screening  Patient Currently in Pain: No    Prior Level of Function:  Social/Functional History  Lives With: Alone  Type of Home: House  Home Layout: One level  Home Access: Level entry  Bathroom Shower/Tub: Tub/Shower unit  Bathroom Equipment: Shower chair  Home Equipment: Rolling walker  Receives Help From: Home health, Family (dtr and 48hrs/wk of HHA)  ADL Assistance: Needs assistance (with LE dressing and bathing)  Homemaking Assistance: Needs assistance (dtr and aid provide assistance)  Homemaking Responsibilities: No  Ambulation Assistance: Independent (2ww)  Transfer Assistance: Independent  Active : No  Additional Comments: pt feels that she is at 70% of previous functional mobility level    OBJECTIVE:   Vision/Hearing:  Vision: Within Functional Limits  Hearing: Within functional limits    Cognition:  Overall Orientation Status: Within Functional Limits  Follows Commands: Within Functional Limits    Observation/Palpation  Observation: no acute distress noted    ROM:  RLE AROM: WFL  LLE AROM : WFL    Strength:  Strength RLE  Comment: >3+/5 functionally assessed  Strength LLE  Comment: >3+/5 functionally assessed    Neuro:  Balance  Sitting - Static: Good;-  Sitting - Dynamic: Fair (limited functional reach ~5 inches OOBOS)  Standing - Static: Fair (requires 2ww)  Standing - Dynamic: Fair (CGA with amb with 2ww)     Motor Control  Gross Motor?: WFL       Bed mobility  Rolling to Right: Supervision  Supine to Sit: Supervision  Comment: pt reports improved mobility this PM compared to this morning, VC for activity pacing. pt reports dizziness that improved within 1 minute    Transfers  Sit to Stand: Contact guard assistance  Stand to sit: Contact guard assistance  Comment: VC for safe hand placement, PCA in to assist with IV pole and PT providing CGA with 2ww    Ambulation  Ambulation?: Yes  More Ambulation?: Yes  Ambulation 1  Surface: level tile  Device: Rolling Walker  Assistance: Contact guard assistance  Quality of Gait: flexed posture, decreased gait speed, decreased step length b/l, reciprocal, no LOB  Distance: 15ft to bedside chair   Comments: increased time for performance, good obstacle negotiation within room. VC for activity pacing and cues for safety with line management. Increased time for safe room set up and bedside chair set up.     Activity

## 2017-10-12 LAB
ANION GAP SERPL CALCULATED.3IONS-SCNC: 13 MEQ/L (ref 7–13)
BUN BLDV-MCNC: 38 MG/DL (ref 6–20)
CALCIUM SERPL-MCNC: 8.8 MG/DL (ref 8.6–10.2)
CHLORIDE BLD-SCNC: 105 MEQ/L (ref 98–107)
CO2: 20 MEQ/L (ref 22–29)
CREAT SERPL-MCNC: 1.21 MG/DL (ref 0.5–0.9)
GFR AFRICAN AMERICAN: 55
GFR NON-AFRICAN AMERICAN: 45.4
GLUCOSE BLD-MCNC: 105 MG/DL (ref 60–115)
GLUCOSE BLD-MCNC: 117 MG/DL (ref 74–109)
GLUCOSE BLD-MCNC: 127 MG/DL (ref 60–115)
GLUCOSE BLD-MCNC: 159 MG/DL (ref 60–115)
GLUCOSE BLD-MCNC: 207 MG/DL (ref 60–115)
GLUCOSE BLD-MCNC: 236 MG/DL (ref 60–115)
PERFORMED ON: ABNORMAL
PERFORMED ON: NORMAL
POTASSIUM SERPL-SCNC: 4 MEQ/L (ref 3.5–5.1)
SODIUM BLD-SCNC: 138 MEQ/L (ref 132–144)

## 2017-10-12 PROCEDURE — 80048 BASIC METABOLIC PNL TOTAL CA: CPT

## 2017-10-12 PROCEDURE — 2500000003 HC RX 250 WO HCPCS: Performed by: INTERNAL MEDICINE

## 2017-10-12 PROCEDURE — 6370000000 HC RX 637 (ALT 250 FOR IP): Performed by: INTERNAL MEDICINE

## 2017-10-12 PROCEDURE — 2580000003 HC RX 258: Performed by: INTERNAL MEDICINE

## 2017-10-12 PROCEDURE — 6360000002 HC RX W HCPCS: Performed by: INTERNAL MEDICINE

## 2017-10-12 PROCEDURE — 1210000000 HC MED SURG R&B

## 2017-10-12 PROCEDURE — 6370000000 HC RX 637 (ALT 250 FOR IP): Performed by: PHYSICIAN ASSISTANT

## 2017-10-12 PROCEDURE — 6360000002 HC RX W HCPCS: Performed by: PHYSICIAN ASSISTANT

## 2017-10-12 PROCEDURE — 2580000003 HC RX 258: Performed by: PHYSICIAN ASSISTANT

## 2017-10-12 RX ORDER — L. ACIDOPHILUS/L.BULGARICUS 1MM CELL
4 TABLET ORAL 3 TIMES DAILY
Status: DISCONTINUED | OUTPATIENT
Start: 2017-10-12 | End: 2017-10-13 | Stop reason: HOSPADM

## 2017-10-12 RX ORDER — SODIUM CHLORIDE 9 MG/ML
INJECTION, SOLUTION INTRAVENOUS CONTINUOUS
Status: DISCONTINUED | OUTPATIENT
Start: 2017-10-12 | End: 2017-10-13 | Stop reason: HOSPADM

## 2017-10-12 RX ADMIN — CYANOCOBALAMIN TAB 500 MCG 1000 MCG: 500 TAB at 09:23

## 2017-10-12 RX ADMIN — CARVEDILOL 12.5 MG: 12.5 TABLET, FILM COATED ORAL at 23:34

## 2017-10-12 RX ADMIN — GABAPENTIN 300 MG: 300 CAPSULE ORAL at 09:23

## 2017-10-12 RX ADMIN — INSULIN GLARGINE 50 UNITS: 100 INJECTION, SOLUTION SUBCUTANEOUS at 23:35

## 2017-10-12 RX ADMIN — OXYBUTYNIN CHLORIDE 10 MG: 5 TABLET, EXTENDED RELEASE ORAL at 09:23

## 2017-10-12 RX ADMIN — GABAPENTIN 300 MG: 300 CAPSULE ORAL at 23:33

## 2017-10-12 RX ADMIN — ENOXAPARIN SODIUM 30 MG: 100 INJECTION SUBCUTANEOUS at 23:35

## 2017-10-12 RX ADMIN — MICONAZOLE NITRATE: 20 POWDER TOPICAL at 23:37

## 2017-10-12 RX ADMIN — DICYCLOMINE HYDROCHLORIDE 20 MG: 20 TABLET ORAL at 09:23

## 2017-10-12 RX ADMIN — ARIPIPRAZOLE 10 MG: 10 TABLET ORAL at 09:23

## 2017-10-12 RX ADMIN — SODIUM CHLORIDE: 9 INJECTION, SOLUTION INTRAVENOUS at 17:45

## 2017-10-12 RX ADMIN — INSULIN LISPRO 2 UNITS: 100 INJECTION, SOLUTION INTRAVENOUS; SUBCUTANEOUS at 09:26

## 2017-10-12 RX ADMIN — LACTOBACILLUS TAB 4 TABLET: TAB at 14:45

## 2017-10-12 RX ADMIN — OXYBUTYNIN CHLORIDE 10 MG: 5 TABLET, EXTENDED RELEASE ORAL at 23:33

## 2017-10-12 RX ADMIN — DICYCLOMINE HYDROCHLORIDE 20 MG: 20 TABLET ORAL at 14:45

## 2017-10-12 RX ADMIN — POTASSIUM CHLORIDE 40 MEQ: 20 TABLET, EXTENDED RELEASE ORAL at 09:23

## 2017-10-12 RX ADMIN — ATORVASTATIN CALCIUM 40 MG: 40 TABLET, FILM COATED ORAL at 23:35

## 2017-10-12 RX ADMIN — Medication 10 ML: at 23:38

## 2017-10-12 RX ADMIN — CEFTRIAXONE 1 G: 1 INJECTION, POWDER, FOR SOLUTION INTRAMUSCULAR; INTRAVENOUS at 23:36

## 2017-10-12 RX ADMIN — ASPIRIN 81 MG: 81 TABLET, COATED ORAL at 09:23

## 2017-10-12 RX ADMIN — POTASSIUM CHLORIDE 40 MEQ: 20 TABLET, EXTENDED RELEASE ORAL at 17:45

## 2017-10-12 RX ADMIN — VITAMIN D, TAB 1000IU (100/BT) 1000 UNITS: 25 TAB at 09:23

## 2017-10-12 RX ADMIN — MICONAZOLE NITRATE: 20 POWDER TOPICAL at 09:30

## 2017-10-12 RX ADMIN — DICYCLOMINE HYDROCHLORIDE 20 MG: 20 TABLET ORAL at 02:27

## 2017-10-12 RX ADMIN — CARVEDILOL 12.5 MG: 12.5 TABLET, FILM COATED ORAL at 09:23

## 2017-10-12 RX ADMIN — SODIUM CHLORIDE, PRESERVATIVE FREE 10 ML: 5 INJECTION INTRAVENOUS at 23:37

## 2017-10-12 RX ADMIN — DICYCLOMINE HYDROCHLORIDE 20 MG: 20 TABLET ORAL at 23:34

## 2017-10-12 RX ADMIN — Medication: at 12:10

## 2017-10-12 RX ADMIN — LACTOBACILLUS TAB 4 TABLET: TAB at 23:32

## 2017-10-12 ASSESSMENT — PAIN SCALES - GENERAL
PAINLEVEL_OUTOF10: 0
PAINLEVEL_OUTOF10: 0

## 2017-10-12 NOTE — PLAN OF CARE
Problem: Falls - Risk of  Goal: Absence of falls  Outcome: Met This Shift      Problem: Pain:  Goal: Pain level will decrease  Pain level will decrease   Outcome: Met This Shift    Goal: Control of acute pain  Control of acute pain   Outcome: Met This Shift    Goal: Control of chronic pain  Control of chronic pain   Outcome: Met This Shift      Problem: Skin Integrity:  Goal: Will show no infection signs and symptoms  Will show no infection signs and symptoms   Outcome: Met This Shift    Goal: Absence of new skin breakdown  Absence of new skin breakdown   Outcome: Met This Shift      Problem: IP SWALLOWING  Goal: LTG - patient will tolerate the least restrictive diet consistency to allow for safe consumption of daily meals  Outcome: Met This Shift      Problem: IP COMMUNICATION/DYSARTHRIA  Goal: LTG - patient will improve expressive language skills to allow for communication of wants and needs in daily activities  Outcome: Met This Shift    Goal: LTG - Patient will improve receptive language skills to allow for completion of daily activities  Outcome: Met This Shift      Problem: IP BALANCE  Goal: LTG - patient will maintain standing balance to allow for completion of daily activities  Outcome: Ongoing      Problem: Mobility - Impaired:  Goal: Mobility will improve  Mobility will improve   Outcome: Ongoing      Problem: Nutrition  Goal: Optimal nutrition therapy  Outcome: Ongoing

## 2017-10-12 NOTE — CARE COORDINATION
DC PLAN REMAINS HOME WITH CHRISTINE HHA AND STEVE C. NEED ORDERS TO RESUME HHC.  PATIENT STATES HER DTR WILL BE STAYING WITH HER FOR A FEW DAYS

## 2017-10-12 NOTE — PROGRESS NOTES
Physical Therapy Missed Treatment   Facility/Department: [facility] MED SURG [id number]    NAME: Nicanor Crespo    : 1960 (47 y.o.)  MRN: [id number]    Account: [de-identified]  Gender: female        [x] Patient Declines PT Treatment: loose stools           [] Patient Unavailable: Will attempt PT treatment again at earliest convenience.         Electronically signed by Gi Woods PTA on 10/12/17 at 10:30 AM

## 2017-10-12 NOTE — PROGRESS NOTES
IVPB in 50 mL D5W minibag  1 g Intravenous Q24H Rebecca Menendez PA-C   Stopped at 10/11/17 2147    aspirin EC tablet 81 mg  81 mg Oral Daily KILEY ReaganC   81 mg at 10/12/17 7449    ARIPiprazole (ABILIFY) tablet 10 mg  10 mg Oral Daily KIELY ReaganC   10 mg at 10/12/17 9174    atorvastatin (LIPITOR) tablet 40 mg  40 mg Oral Nightly MARCELO Reagan-C   40 mg at 10/11/17 2109    carvedilol (COREG) tablet 12.5 mg  12.5 mg Oral BID KILEY ReaganC   12.5 mg at 10/12/17 5145    clopidogrel (PLAVIX) tablet 75 mg  75 mg Oral Once Rebecca Menendez PA-C        dicyclomine (BENTYL) tablet 20 mg  20 mg Oral 4 times per day KILEY ReaganC   20 mg at 10/12/17 0961    gabapentin (NEURONTIN) capsule 300 mg  300 mg Oral BID KILEY ReaganC   300 mg at 10/12/17 1851    insulin lispro (HUMALOG) injection vial 20 Units  20 Units Subcutaneous TID WC Rebecca Menendez PA-C   20 Units at 10/12/17 1484    insulin glargine (LANTUS) injection vial 50 Units  50 Units Subcutaneous Nightly KILEY ReaganC   50 Units at 10/11/17 2119    nitroGLYCERIN (NITROSTAT) SL tablet 0.4 mg  0.4 mg Sublingual Q5 Min PRN Rebecca Menendez PA-C        miconazole (MICOTIN) 2 % powder   Topical BID Rebecca Menendez PA-C        oxybutynin (DITROPAN-XL) extended release tablet 10 mg  10 mg Oral BID KILEY ReaganC   10 mg at 10/12/17 2573    vitamin B-12 (CYANOCOBALAMIN) tablet 1,000 mcg  1,000 mcg Oral Daily KILEY ReaganC   1,000 mcg at 10/12/17 6604    vitamin D (CHOLECALCIFEROL) tablet 1,000 Units  1,000 Units Oral Daily KILEY ReaganC   1,000 Units at 10/12/17 1125    insulin lispro (HUMALOG) injection vial 0-12 Units  0-12 Units Subcutaneous TID WC Rebecca Menendez PA-C   2 Units at 10/12/17 5362    insulin lispro (HUMALOG) injection vial 0-6 Units  0-6 Units Subcutaneous Nightly Rebecca Menendez PA-C   1 Units at 10/11/17 2116       OBJECTIVE  PHYSICAL EXAM:   BP (!) 151/76 Pulse 74   Temp 97.5 °F (36.4 °C)   Resp 18   Ht 5' 7\" (1.702 m)   Wt 242 lb 11.6 oz (110.1 kg)   SpO2 98%   BMI 38.02 kg/m²   Body mass index is 38.02 kg/m². CONSTITUTIONAL:  awake, alert, cooperative, no apparent distress, and appears stated age  ENT:  Normocephalic, without obvious abnormality, atraumatic, sinuses nontender on palpation, external ears without lesions, oral pharynx with moist mucus membranes, tonsils without erythema or exudates, gums normal and good dentition. BACK:  Symmetric, no curvature, spinous processes are non-tender on palpation, paraspinous muscles are non-tender on palpation, no costal vertebral tenderness  LUNGS:  No increased work of breathing, good air exchange, clear to auscultation bilaterally, no crackles or wheezing  CARDIOVASCULAR:  Normal apical impulse, regular rate and rhythm, normal S1 and S2, no S3 or S4, and no murmur noted  ABDOMEN:  No scars, normal bowel sounds, soft, non-distended, non-tender, no masses palpated, no hepatosplenomegally  MUSCULOSKELETAL:  There is no redness, warmth, or swelling of the joints. Full range of motion noted. Motor strength is 5 out of 5 all extremities bilaterally. Tone is normal.  NEUROLOGIC:  Awake, alert, oriented to name, place and time. Cranial nerves II-XII are grossly intact. Motor is 5 out of 5 bilaterally. Cerebellar finger to nose, heel to shin intact. Sensory is intact. Babinski down going, Romberg negative, and gait is normal.  SKIN:  no bruising or bleeding, no lesions and no jaundice    DATA:     Recent Results (from the past 24 hour(s))   POCT Glucose    Collection Time: 10/11/17  8:26 PM   Result Value Ref Range    POC Glucose 198 (H) 60 - 115 mg/dl    Performed on ACCU-CHEK    POCT Glucose    Collection Time: 10/12/17  8:43 AM   Result Value Ref Range    POC Glucose 159 (H) 60 - 115 mg/dl    Performed on ACCU-CHEK        ASSESSMENT AND PLAN   1.  UTI-continue iv abx as ordered.   2.  LUCÍA-managed   FU BUN/CR today  3. Diarrhea-will obtain culture and start lactobacillus   4. Metabolic acidosis  5. ST9-fbzkgaatgrsd-wvgejyof accuchecks with ss coverage dm meds and diet  6. HTN-controlled-continue antihypertensive meds as ordered    Pt was seen and evaluated and discussed care with PA.  Agree with assessment and plan  Electronically signed by Marita Pickering MD on 10/12/17 at 1:01 PM      SIGNATURE: MARCELO Kinsey PATIENT NAME: Brianda Enciso: October [birth year] 2017 MRN: [id number]   TIME: 12:02 PM PAGER: [phone]     Dr. Silvia Rose, [address] [place] [address] Physician

## 2017-10-13 VITALS
HEIGHT: 67 IN | DIASTOLIC BLOOD PRESSURE: 63 MMHG | HEART RATE: 73 BPM | TEMPERATURE: 98.6 F | RESPIRATION RATE: 18 BRPM | OXYGEN SATURATION: 97 % | BODY MASS INDEX: 35.74 KG/M2 | WEIGHT: 227.74 LBS | SYSTOLIC BLOOD PRESSURE: 147 MMHG

## 2017-10-13 LAB
ANION GAP SERPL CALCULATED.3IONS-SCNC: 12 MEQ/L (ref 7–13)
BLOOD CULTURE, ROUTINE: NORMAL
BUN BLDV-MCNC: 34 MG/DL (ref 6–20)
CALCIUM SERPL-MCNC: 8.7 MG/DL (ref 8.6–10.2)
CHLORIDE BLD-SCNC: 111 MEQ/L (ref 98–107)
CO2: 20 MEQ/L (ref 22–29)
CREAT SERPL-MCNC: 1.24 MG/DL (ref 0.5–0.9)
CULTURE, BLOOD 2: NORMAL
GFR AFRICAN AMERICAN: 53.4
GFR NON-AFRICAN AMERICAN: 44.2
GLUCOSE BLD-MCNC: 115 MG/DL (ref 74–109)
GLUCOSE BLD-MCNC: 122 MG/DL (ref 60–115)
GLUCOSE BLD-MCNC: 175 MG/DL (ref 60–115)
PERFORMED ON: ABNORMAL
PERFORMED ON: ABNORMAL
POTASSIUM SERPL-SCNC: 4.7 MEQ/L (ref 3.5–5.1)
SODIUM BLD-SCNC: 143 MEQ/L (ref 132–144)

## 2017-10-13 PROCEDURE — 6370000000 HC RX 637 (ALT 250 FOR IP): Performed by: PHYSICIAN ASSISTANT

## 2017-10-13 PROCEDURE — 80048 BASIC METABOLIC PNL TOTAL CA: CPT

## 2017-10-13 PROCEDURE — 2580000003 HC RX 258: Performed by: INTERNAL MEDICINE

## 2017-10-13 PROCEDURE — 6370000000 HC RX 637 (ALT 250 FOR IP): Performed by: INTERNAL MEDICINE

## 2017-10-13 PROCEDURE — 36592 COLLECT BLOOD FROM PICC: CPT

## 2017-10-13 RX ORDER — CEFUROXIME AXETIL 500 MG/1
250 TABLET ORAL 2 TIMES DAILY
Qty: 6 TABLET | Refills: 0 | Status: SHIPPED | OUTPATIENT
Start: 2017-10-13 | End: 2017-10-16

## 2017-10-13 RX ORDER — L. ACIDOPHILUS/L.BULGARICUS 1MM CELL
4 TABLET ORAL 3 TIMES DAILY
Qty: 20 TABLET | Refills: 0 | Status: SHIPPED | OUTPATIENT
Start: 2017-10-13 | End: 2018-06-08

## 2017-10-13 RX ADMIN — VITAMIN D, TAB 1000IU (100/BT) 1000 UNITS: 25 TAB at 08:29

## 2017-10-13 RX ADMIN — DICYCLOMINE HYDROCHLORIDE 20 MG: 20 TABLET ORAL at 02:52

## 2017-10-13 RX ADMIN — GABAPENTIN 300 MG: 300 CAPSULE ORAL at 08:29

## 2017-10-13 RX ADMIN — ASPIRIN 81 MG: 81 TABLET, COATED ORAL at 08:29

## 2017-10-13 RX ADMIN — CYANOCOBALAMIN TAB 500 MCG 1000 MCG: 500 TAB at 08:28

## 2017-10-13 RX ADMIN — ARIPIPRAZOLE 10 MG: 10 TABLET ORAL at 08:29

## 2017-10-13 RX ADMIN — OXYBUTYNIN CHLORIDE 10 MG: 5 TABLET, EXTENDED RELEASE ORAL at 08:28

## 2017-10-13 RX ADMIN — LACTOBACILLUS TAB 4 TABLET: TAB at 08:28

## 2017-10-13 RX ADMIN — POTASSIUM CHLORIDE 40 MEQ: 20 TABLET, EXTENDED RELEASE ORAL at 08:29

## 2017-10-13 RX ADMIN — SODIUM CHLORIDE: 9 INJECTION, SOLUTION INTRAVENOUS at 02:52

## 2017-10-13 RX ADMIN — CARVEDILOL 12.5 MG: 12.5 TABLET, FILM COATED ORAL at 08:29

## 2017-10-13 RX ADMIN — DICYCLOMINE HYDROCHLORIDE 20 MG: 20 TABLET ORAL at 08:29

## 2017-10-13 NOTE — PROGRESS NOTES
NEUROLOGY INPATIENT PROGRESS NOTE    Patient- Inell Sprain    MRN -  [id number]   Acct # - [de-identified]      - 1960    59 y.o. Subjective: The patient is seen in follow-up. Patient is alert at this time. Result Data:  CBC:   Recent Labs      10/10/17   0507   WBC  10.3   HGB  9.5*   PLT  169     BMP:    Recent Labs      10/10/17   0507  10/11/17   0713  10/12/17   1000   NA  139  146*  138   K  4.1  3.0*  4.0   CL  113*  121*  105   CO2  9*  12*  20*   BUN  99*  57*  38*   CREATININE  3.30*  1.63*  1.21*   GLUCOSE  167*  71*  117*     TSH: No results for input(s): TSH in the last 72 hours. Folic Acid: No results for input(s): FOLATE in the last 72 hours. B12:    Lab Results   Component Value Date    [id number] 453 2014     Vit. D: No components found for: VITAMIND  Lipids: No results for input(s): CHOL, TRIG, HDL, LDLCALC in the last 72 hours. Ammonia: No results for input(s): AMMONIA in the last 72 hours. LFT: No results for input(s): AST, ALT, ALB, BILITOT, ALKPHOS in the last 72 hours. Urine: No results for input(s): COLORU, PHUR, LABCAST, WBCUA, RBCUA, MUCUS, YEAST, BACTERIA, CLARITYU, SPECGRAV, LEUKOCYTESUR, UROBILINOGEN, Srinivas Lux in the last 72 hours. Invalid input(s): NITRATE, GLUCOSEUKETONESUAMORPHOUS     Imaging    Ct Abdomen Pelvis Wo Iv Contrast Additional Contrast? None    Result Date: 10/8/2017  EXAMINATION:  CT ABDOMEN AND PELVIS WITHOUT IV CONTRAST CLINICAL HISTORY:  DIABETES WITH ABDOMINAL PAIN WITH DIARRHEA X2 WEEKS, POSSIBLE COLITIS COMPARISON:  2015 TECHNIQUE:  CT abdomen and pelvis is obtained without IV contrast. FINDINGS:  There is mild colonic diverticulosis without diverticulitis. The appendix appears normal. There is no CT evidence of colitis. Small bowel appears unremarkable and there is no bowel obstruction.  The liver, gallbladder, spleen, pancreas, kidneys and adrenal glands appear normal. There is no retroperitoneal adenopathy. There is no abdominal mass, \"free fluid\", abscess or pneumoperitoneum in the abdomen. CT scan of the pelvis confirms a previous hysterectomy. There is bladder wall thickening which may be secondary to nondistention of the bladder or cystitis and clinical correlation is warranted. There is no pelvic mass, adenopathy or \"free fluid\" in the pelvis. Also, there is no inguinal hernia or adenopathy. 1. NORMAL APPENDIX AND MILD COLONIC DIVERTICULOSIS. NO APPENDICITIS, DIVERTICULITIS OR COLITIS. 2. SMALL BOWEL APPEARS UNREMARKABLE AND THERE IS NO EVIDENCE OF BOWEL OBSTRUCTION. 3. NO ORGANOMEGALY, MASS, \"FREE FLUID\", ABSCESS OR PNEUMOPERITONEUM IN THE ABDOMEN. 4. PRIOR HYSTERECTOMY OTHERWISE, UNREMARKABLE CT SCAN OF THE PELVIS. 5. BLADDER WALL THICKENING MAY BE FROM NONDISTENTION OF THE BLADDER OR CYSTITIS. All CT scans at this facility use dose modulation, iterative reconstruction, and/or weight based dosing when appropriate to reduce radiation dose to as low as reasonably achievable. Xr Chest Standard (2 Vw)    Result Date: 10/9/2017  X-RAY: A CHEST, 2 VIEWS:   CLINICAL HISTORY:   synocpe, 10 days of shortness of breath, wheezing on exam   COMPARISONS: 5/22/2016 FINDINGS:  Normal cardiac silhouette. Lungs are clear without consolidation. No pleural effusion or pneumothorax. There is minor loss within the lower thoracic vertebra, new since last exam.     NO ACUTE CARDIOPULMONARY ABNORMALITY. LOWER THORACIC VERTEBRAL FRACTURE, NEW SINCE LAST EXAM.     Ct Head Wo Contrast    Result Date: 10/8/2017  CT HEAD WO CONTRAST CLINICAL HISTORY:  syncope, confusion Comparison: Tammie 15, 2016 TECHNIQUE: Multiple unenhanced serial axial images of the brain from the vertex of the skull to the base of the skull were performed. FINDINGS: The ventricles are dilated. This is compensatory to the surrounding moderate generalized parenchymal volume loss. No mass. No midline shift. The cisterns are patent.  No anterior, middle and cerebral arteries. No demonstrable aneurysm or AVM. No evidence for hemodynamically significant stenosis. NO DEMONSTRABLE ANEURYSM. NO EVIDENCE FOR HEMODYNAMICALLY SIGNIFICANT STENOSIS. Nm Lung Vent/perfusion (vq)    Result Date: 10/9/2017  EXAMINATION: RADIONUCLIDE VENTILATION/PERFUSION LUNG SCAN (VQ SCAN) CLINICAL HISTORY: HISTORY OF DIABETES, ASTHMA AND RENAL INSUFFICIENCY, PATIENT WITH DYSPNEA, EVALUATE FOR POSSIBLE PULMONARY EMBOLISM COMPARISONS: None available. FINDINGS: Patient breathed an aerosol with 1 mCi of technetium DTPA for the ventilation scan. There is relatively homogeneous ventilation to both lungs except for a few foci of isotope aggregates in the central lung fields. Subsequently the patient received an IV injection with 5.8 mCi of technetium MAA for the perfusion lung scan. There is homogeneous perfusion to both lungs without segmental or subsegmental perfusion defects. No evidence for pulmonary emboli. 1. NEGATIVE RADIONUCLIDE VENTILATION/PERFUSION LUNG SCAN. 2. NO SCINTIGRAPHIC EVIDENCE FOR PULMONARY EMBOLISM. Ir Harpal Melody Device Placement    Result Date: 10/10/2017  EXAMINATION: PERIPHERALLY INSERTED CENTRAL CATHETER (PICC) PLACEMENT WITH ULTRASOUND GUIDANCE CLINICAL HISTORY: LONG-TERM THERAPY FOR TREATMENT OF URINARY TRACT INFECTION. After discussing the procedure and possible complications with the patient, informed consent was obtained. The patient was placed on the Special Procedures table. The right upper extremity was sterilely prepared using 2% chlorhexidine and then draped with sterile towels and a body-sized sterile drape. All personnel in the Special Procedures Room donned caps and surgical masks. In addition, the  and first assistant donned sterile gowns and gloves after proper hand cleansing. A pre-procedure time out was performed in order to assure the correct patient and procedure.   Local anesthetic was administered. A peripheral vein was accessed with sonographic guidance. A sonographic spot image was obtained for documentation. A guidewire was advanced into the vein with fluoroscopic guidance and a sheath was placed over the guidewire. A 5-Danish dual lumen PICC was advanced through the sheath, up the arm and into the central vasculature. It was positioned appropriately. The sheath was removed. The catheter was shown to aspirate and infuse properly. The flange of the catheter was affixed to the arm using a PICC securement device. A spot image of the chest showed the tip of the PICC line to lie in the superior vena cava. The patient tolerated the procedure well and without complications. CONCLUSION: SUCCESSFUL RIGHT UPPER EXTREMITY PICC PLACEMENT WITHOUT IMMEDIATE COMPLICATIONS. Ir Picc Equal Or Greater Than 5 Years    Result Date: 10/10/2017  EXAMINATION: PERIPHERALLY INSERTED CENTRAL CATHETER (PICC) PLACEMENT WITH ULTRASOUND GUIDANCE CLINICAL HISTORY: LONG-TERM THERAPY FOR TREATMENT OF URINARY TRACT INFECTION. After discussing the procedure and possible complications with the patient, informed consent was obtained. The patient was placed on the Special Procedures table. The right upper extremity was sterilely prepared using 2% chlorhexidine and then draped with sterile towels and a body-sized sterile drape. All personnel in the Special Procedures Room donned caps and surgical masks. In addition, the  and first assistant donned sterile gowns and gloves after proper hand cleansing. A pre-procedure time out was performed in order to assure the correct patient and procedure. Local anesthetic was administered. A peripheral vein was accessed with sonographic guidance. A sonographic spot image was obtained for documentation. A guidewire was advanced into the vein with fluoroscopic guidance and a sheath was placed over the guidewire.   A 5-Danish dual lumen PICC was advanced

## 2017-10-13 NOTE — DISCHARGE SUMMARY
Physician Discharge Summary     Patient ID:  Heena Martinez  [id number]  59 y.o.  1960    Admit date: 10/8/2017    Discharge date and time:10/13/17    Admitting Physician: Ric Palomo MD     Discharge Physician: [facility]    Admission Diagnoses: Syncope and collapse [R55]  LUCÍA (acute kidney injury) ([facility] [N17.9]    Discharge Diagnoses: UTI, LUCÍA,acidosis DM , hypotension, syncope  Past Medical History:   Diagnosis Date    Anxiety     CAD S/P percutaneous coronary angioplasty 2015    stent per dr Clemens Matter pontine myelinolysis [facility]) 2013    Diabetic nephropathy with proteinuria ([facility] 2014    DJD (degenerative joint disease) of knee     Dr Titus Morales GERD (gastroesophageal reflux disease)     Hemiparesis, left ([facility] 2013    [facility] ([facility]    History of seizures     History of type C viral hepatitis     HTN (hypertension)     Hyperlipidemia     Impaired mobility and activities of daily living     Mediastinal lymphadenopathy 2013    Dr rGace Higgins, Adeel Bigg    Metabolic syndrome     Neurogenic urinary incontinence 2013    Obesity (BMI 30-39. 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 ([facility]     Type 2 diabetes mellitus with renal manifestations, controlled ([facility] 2015    Insulin dependent    Urinary incontinence due to cognitive impairment 2013    Vitamin D deficiency 2014         Admission Condition: gaurded    Discharged Condition: stable  Indication for Admission: syncope, hypotension    Hospital Course: aggressive hydaration, IV abx, EEG,  CT head    Consults: neuro, Renal    Significant Diagnostic Studies:  Lab Results   Component Value Date    WBC 10.3 10/10/2017    HGB 9.5 (L) 10/10/2017    HCT 28.3 (L) 10/10/2017    MCV 83.6 10/10/2017     10/10/2017     Lab Results   Component Value Date     10/13/2017    K 4.7 10/13/2017     10/13/2017    CO2 20 10/13/2017    BUN 34 10/13/2017    CREATININE 1.24 10/13/2017    GLUCOSE 115 10/13/2017    CALCIUM 8.7 10/13/2017          Treatments:   Current Facility-Administered Medications   Medication Dose Route Frequency Provider Last Rate Last Dose    enoxaparin (LOVENOX) injection 30 mg  30 mg Subcutaneous Daily Mega Caro MD   30 mg at 10/12/17 2335    lactobacillus acidophilus [facility]) 4 tablet  4 tablet Oral TID MARCELO Schwab   4 tablet at 10/13/17 0828    0.9 % sodium chloride infusion   Intravenous Continuous Mega Caro MD 75 mL/hr at 10/13/17 0252      glucose (GLUTOSE) 40 % oral gel 15 g  15 g Oral PRN Mega Caro MD        dextrose 50 % solution 12.5 g  12.5 g Intravenous PRN Mega Caro MD        glucagon (rDNA) injection 1 mg  1 mg Intramuscular PRN Mega Caro MD        dextrose 5 % solution  100 mL/hr Intravenous PRN Mega Caro MD        potassium chloride (KLOR-CON M) extended release tablet 40 mEq  40 mEq Oral BID WC Mega Caro MD   40 mEq at 10/13/17 0829    sodium chloride flush 0.9 % injection 10 mL  10 mL Intravenous PRN Broderick Guzman PA-C   10 mL at 10/09/17 0335    sodium chloride flush 0.9 % injection 10 mL  10 mL Intravenous 2 times per day Mel Steinberg DO   10 mL at 10/12/17 2337    sodium chloride flush 0.9 % injection 10 mL  10 mL Intravenous PRN Mel Steinberg DO        sodium chloride flush 0.9 % injection 10 mL  10 mL Intravenous 2 times per day Broderick Guzman PA-C   10 mL at 10/12/17 2338    sodium chloride flush 0.9 % injection 10 mL  10 mL Intravenous PRN Broderick Guzman PA-C        acetaminophen (TYLENOL) tablet 650 mg  650 mg Oral Q4H PRN Broderick Guzman PA-C        magnesium hydroxide (MILK OF MAGNESIA) 400 MG/5ML suspension 30 mL  30 mL Oral Daily PRN Nola ARMAS 0-6 Units  0-6 Units Subcutaneous Nightly Srinivasan Dahl PA-C   1 Units at 10/11/17 2116         Discharge Exam:  HEENT: AT/NC, PERRLA, no JVD  HEART: s1/s2 wnl w/o s3  LUNG: clear  ABD: soft, NT  EXT: no edema  SKin : no rash  Neuro:no focal deficits      Disposition: home    Patient Instructions:      Activity: as toelrated  Diet: diabetic diet      Follow-up with PCP in 3 days for FU on BP  Overtime on D/C summary was 45 min  [facility] on hold bc of LUCÍA and hypotension   FU PCP      Medication List      START taking these medications    cefUROXime 500 MG tablet  Commonly known as:  CEFTIN  Take 0.5 tablets by mouth 2 times daily for 3 days     lactobacillus acidophilus  Take 4 tablets by mouth 3 times daily        CONTINUE taking these medications    ARIPiprazole 10 MG tablet  Commonly known as:  ABILIFY  Take 1 tablet by mouth daily     ASPIRIN LOW DOSE 81 MG EC tablet  Generic drug:  aspirin  TAKE (1) TABLET BY MOUTH DAILY     atorvastatin 40 MG tablet  Commonly known as:  LIPITOR  TAKE (1) TABLET BY MOUTH NIGHTLY     carvedilol 12.5 MG tablet  Commonly known as:  COREG  Take 1 tablet by mouth 2 times daily     clopidogrel 75 MG tablet  Commonly known as:  PLAVIX  Take 1 tablet by mouth nightly     dicyclomine 20 MG tablet  Commonly known as:  BENTYL  Take 1 tablet by mouth every 6 hours for 3 days     docusate sodium 100 MG capsule  Commonly known as:  COLACE  TAKE (1) CAPSULE BY MOUTH TWICE DAILY     EASY TOUCH ALCOHOL PREP MEDIUM 70 % Pads  USE AS DIRECTED THREE TIMES A DAY     FREESTYLE LANCETS Misc     FREESTYLE LITE [place]     FREESTYLE LITE strip  Generic drug:  glucose blood VI test strips  TEST every 6 hours     gabapentin 300 MG capsule  Commonly known as:  NEURONTIN  TAKE (1) CAPSULE BY MOUTH TWICE DAILY     insulin aspart 100 UNIT/ML injection pen  Commonly known as:  NOVOLOG FLEXPEN  Inject 20 Units into the skin 3 times daily (before meals)     * Insulin Pen Needle 30G X 5 MM Misc  Commonly known as: BD AUTOSHIELD DUO  1 each by Does not apply route 2 times daily For use with Lantus Solostar, DX: E11.29, IDDM     * NOVOFINE AUTOCOVER 30G X 8 MM Misc  Generic drug:  Insulin Pen Needle  1 each by Does not apply route 3 times daily     LANTUS SOLOSTAR 100 UNIT/ML injection pen  Generic drug:  insulin glargine  INJECT 50 UNITS INTO THE SKIN TWICE DAILY     MAPAP 500 MG tablet  Generic drug:  acetaminophen  TAKE 1 TABLET BY MOUTH EVERY 6 HOURS AS NEEDED FOR PAIN OR FEVER     nitroGLYCERIN 0.4 MG SL tablet  Commonly known as:  NITROSTAT  DISSOLVE 1 TAB UNDER THE TONGUE AS NEEDED FOR CHEST PAIN EVERY 5 MINUTES UP TO 3 TIMES. IF NO RELIEF CALL 911.     nystatin 644522 UNIT/GM powder  Generic drug:  nystatin  APPLY TO ABDOMINAL FOLDS EVERY 12 HOURS AS NEEDED     omeprazole 20 MG delayed release capsule  Commonly known as:  PRILOSEC  TAKE (1) CAPSULE BY MOUTH DAILY AS NEEDED FOR HEATBURN     oxybutynin 10 MG extended release tablet  Commonly known as:  DITROPAN XL  Take one po bid     traZODone 50 MG tablet  Commonly known as:  DESYREL  TAKE 1-2 TABLETS BY MOUTH DAILY AT BEDTIME AS NEEDED FOR INSOMNIA     TRULICITY 9.73 ML/3.2PI Sopn  Generic drug:  Dulaglutide  INJECT 0.5ML SUBCUTANEOUSLY ONCE WEEKLY AS DIRECTED     vitamin B-12 1000 MCG tablet  Commonly known as:  CYANOCOBALAMIN  Take 1 tablet by mouth daily     Vitamin D (Cholecalciferol) 1000 units Caps  Take 1,000 Units by mouth daily        * This list has 2 medication(s) that are the same as other medications prescribed for you. Read the directions carefully, and ask your doctor or other care provider to review them with you.             STOP taking these medications    lisinopril 10 MG tablet  Commonly known as:  PRINIVIL;ZESTRIL     sertraline 100 MG tablet  Commonly known as:  ZOLOFT           Where to Get Your Medications      These medications were sent to [facility], [address] [facility] [address] [id number]    Phone:  [phone]   · cefUROXime 500 MG tablet  · lactobacillus acidophilus         Signed:  Maria A Whitaker  10/13/2017  8:35 AM

## 2017-10-13 NOTE — FLOWSHEET NOTE
AM assessment completed. Pt has not had a bowel movement yet today. C. Diff previously negative. Dr. Winnie Shelby stated to take patient off precautions. Denies any pain/n/v at this time. Patient will be discharged home with home health care this afternoon. Call light within reach. Will continue to monitor.

## 2017-10-13 NOTE — HOME CARE
Transfer To [address] [place] [address]  [id number]  Patient Name: Leslee Weir         Address: [address] [place] [address] [address]       .   MRN: [id number]                          : 1960  (62 y.o.)       Phone:     [phone] (home)   Gender: female   Demographics Verified:  [x]Yes   []No                                                       SSN:        Diagnosis:    UTI, LUCÍA,acidosis DM , hypotension, syncope     Code Status: Resuscitation/Code Status Note on Michelle Kang Adrian  The code status was made Prior     High Risk For Readmission:  [x]Yes   []No             [x]  F2F Completed   Ordering Physician: (Dr. Crowell Ax)  PCP: Nico Joyce MD   Anticipated Discharge Date: (10/13)    Ankita Gutierrez 20 Coordinatior     []Yes   []No  Signed up for Palliative Care Services   []Yes   []No  (Ask for Palliative Care)     []  Flu Vaccine     Date:    (10/14/16)   []  PNEUMONIA   Date:    (10/15/16)                              [facility]       [x]   Skilled Nursing       [x]  Med/Surg       [] Mental Health   []   Physical Therapy    []  Home Safety Eval    []  Fast track  DME NEEDS:   ()   []   Occupational Therapy     []  Cognition and Memory  DME NEEDS:   ()   []  Speech therapy        []  Swallow Eval & Treat    []  Cognition   []  Home Care Aide  Medications that need SN teaching: (instruct in medication regime )         [] Community Resource Linkage       [] Supportive Counseling : ()   [] Short/Long Term Placement           []  Application F/U:  [] Medicaid  [] Passport      OXYGEN                                 [] Home O2     Liters per [place]  ()   [] Intermittent/Continuous   [] Bipap            [] CPap   [] Ventilator     [] Nebulizer  Supplier/Contact # :    ()            LAB WORK                                     OUTPATIENT TESTING                                   [] Coumadin Clinic         [] Other Tests ()                                   Home Care Coordinator: RN Signature Electronically signed by Macy Gleason RN on 10/13/17 at 3:12 PM   10/13/2017

## 2017-10-14 ENCOUNTER — CARE COORDINATION (OUTPATIENT)
Dept: CASE MANAGEMENT | Age: 57
End: 2017-10-14

## 2017-10-14 NOTE — CARE COORDINATION
[facility] Transitions Initial Follow Up Call    Call within 2 business days of discharge: Yes    Patient: Lorenzo Avila Patient : 1960   MRN: <[id number]>  Reason for Admission:  UTI, LUCÍA,acidosis DM , hypotension, syncope  Discharge Date: 10/13/17 RARS: [facility] Risk Score: 25.5     Spoke with: patient [address]: [facility]      Non-face-to-face services provided:  Obtained and reviewed discharge summary and/or continuity of care documents  Communication with specialists who will assume or re-assume care of the patient's system-specific problems-[facility]    Care Transitions 24 Hour Call    Do you have any ongoing symptoms?:  No  Do you have a copy of your discharge instructions?:  Yes  Do you have all of your prescriptions and are they filled?:  Yes  Have you been contacted by a [facility] Pharmacist?:  No  Have you scheduled your follow up appointment?:  Yes  Were you discharged with any Home Care or Post Acute Services:  Yes  Post Acute Services:  Home Health (Comment: [facility] )  Patient DME:  Tru lam  Do you have support at home?:  Alone  Are you an active caregiver in your home?:  No  Care Transitions Interventions     Conversation with patient was very brief, it was difficult to keep her focused on anything other than home care. Could not do sx review with her, she said she was feeling fine, stated she had her 2 new meds, Ceftin and Lactobacillus and knew to stop Lisinopril and Zoloft but would not complete full med review. Said she called her PT and was told that she couldn't come out to see her today because they didn't have orders and patient wanted me to see about getting orders sent to them. She was very anxious about this. Told her record indicated that orders had been sent but I would refax them and call and look into it and get back with her.   Refaxed the AVS/WILIAN and Transfer to Home Care Forms to [facility] at [phone]. I then called the [facility] and the answering service paged the on-call nurse. Davy Garcia, the , returned the page and I explained the situation and asked her if she knew if the referral had been received yesterday as our records indicate and if patient would be seen since I sent information today, told her patient was very anxious about getting home care resumed. She said she was not at the office but would look into and call me back. She returned my call and said referral had been received and patient was scheduled to be seen tomorrow by the nurse to resume care, that PT couldn't go out until case was reopened and that may have been confusing/miscommunicated to patient. Told her I would call patient back and relay information to her. Called patient and informed her of all of above information. Denies any needs at present time. Agreeable to f/u calls.        Follow Up  Future Appointments  Date Time Provider Aminata Cortes   10/16/2017 12:15 PM Sebastian Kline MD [facility]   12/1/2017 10:30 AM Sebastian Kline MD [facility]   6/12/2018 11:30 AM Radha Barroso MD [facility]       Yonatan Bonillay, [address] [place] [address]

## 2017-10-16 ENCOUNTER — OFFICE VISIT (OUTPATIENT)
Dept: INTERNAL MEDICINE | Age: 57
End: 2017-10-16

## 2017-10-16 ENCOUNTER — CARE COORDINATION (OUTPATIENT)
Dept: CARE COORDINATION | Age: 57
End: 2017-10-16

## 2017-10-16 VITALS
BODY MASS INDEX: 35.63 KG/M2 | WEIGHT: 227 LBS | SYSTOLIC BLOOD PRESSURE: 120 MMHG | OXYGEN SATURATION: 97 % | HEIGHT: 67 IN | TEMPERATURE: 97.8 F | DIASTOLIC BLOOD PRESSURE: 70 MMHG | HEART RATE: 67 BPM

## 2017-10-16 DIAGNOSIS — E11.40 CONTROLLED TYPE 2 DIABETES MELLITUS WITH DIABETIC NEUROPATHY, WITH LONG-TERM CURRENT USE OF INSULIN (HCC): Chronic | ICD-10-CM

## 2017-10-16 DIAGNOSIS — I10 ESSENTIAL HYPERTENSION: Primary | Chronic | ICD-10-CM

## 2017-10-16 DIAGNOSIS — Z79.4 CONTROLLED TYPE 2 DIABETES MELLITUS WITH DIABETIC NEUROPATHY, WITH LONG-TERM CURRENT USE OF INSULIN (HCC): Chronic | ICD-10-CM

## 2017-10-16 LAB — HBA1C MFR BLD: 6.4 %

## 2017-10-16 PROCEDURE — 3046F HEMOGLOBIN A1C LEVEL >9.0%: CPT | Performed by: INTERNAL MEDICINE

## 2017-10-16 PROCEDURE — G8417 CALC BMI ABV UP PARAM F/U: HCPCS | Performed by: INTERNAL MEDICINE

## 2017-10-16 PROCEDURE — 3017F COLORECTAL CA SCREEN DOC REV: CPT | Performed by: INTERNAL MEDICINE

## 2017-10-16 PROCEDURE — G8598 ASA/ANTIPLAT THER USED: HCPCS | Performed by: INTERNAL MEDICINE

## 2017-10-16 PROCEDURE — 1036F TOBACCO NON-USER: CPT | Performed by: INTERNAL MEDICINE

## 2017-10-16 PROCEDURE — 3014F SCREEN MAMMO DOC REV: CPT | Performed by: INTERNAL MEDICINE

## 2017-10-16 PROCEDURE — 1111F DSCHRG MED/CURRENT MED MERGE: CPT | Performed by: INTERNAL MEDICINE

## 2017-10-16 PROCEDURE — 99214 OFFICE O/P EST MOD 30 MIN: CPT | Performed by: INTERNAL MEDICINE

## 2017-10-16 PROCEDURE — G8484 FLU IMMUNIZE NO ADMIN: HCPCS | Performed by: INTERNAL MEDICINE

## 2017-10-16 PROCEDURE — G8427 DOCREV CUR MEDS BY ELIG CLIN: HCPCS | Performed by: INTERNAL MEDICINE

## 2017-10-16 RX ORDER — LISINOPRIL 2.5 MG/1
2.5 TABLET ORAL DAILY
Qty: 30 TABLET | Refills: 5 | Status: ON HOLD | OUTPATIENT
Start: 2017-10-16 | End: 2018-02-09 | Stop reason: HOSPADM

## 2017-10-16 ASSESSMENT — ENCOUNTER SYMPTOMS
CHOKING: 0
COUGH: 0
CHEST TIGHTNESS: 0
TROUBLE SWALLOWING: 0
EYE PAIN: 0
ABDOMINAL DISTENTION: 0
SHORTNESS OF BREATH: 0
ORTHOPNEA: 0
ABDOMINAL PAIN: 0
VOICE CHANGE: 0

## 2017-10-16 NOTE — CARE COORDINATION
Received call from patient stating that [facility] needed order to resume her care. Informed patient that I would speak with Dr Manjit Viramontes and contact [place]. Called  and spoke with Beatris Holguin. Informed her Dr Manjit Viramontes gave verbal order to resume services.
cane or walker  Follow through on orders for PT    Barriers: lack of education  Plan for overcoming my barriers: N/A  Confidence: 8/10  Anticipated Goal Completion Date: 12/31/2017            Prior to Admission medications    Medication Sig Start Date End Date Taking? Authorizing Provider   cyanocobalamin ([employer] VITAMIN B12) 1000 MCG tablet Take 1 tablet by mouth daily 10/16/17   Shira Griffiths MD   lisinopril (ZESTRIL) 2.5 MG tablet Take 1 tablet by mouth daily 10/16/17   Shira Griffiths MD   sertraline (ZOLOFT) 50 MG tablet Take 1 tablet by mouth daily 10/16/17   Shira Griffiths MD   lactobacillus acidophilus [facility]) Take 4 tablets by mouth 3 times daily 10/13/17   Jarret Flannery MD   cefUROXime (CEFTIN) 500 MG tablet Take 0.5 tablets by mouth 2 times daily for 3 days 10/13/17 10/16/17  Jarret Flannery MD   dicyclomine (BENTYL) 20 MG tablet Take 1 tablet by mouth every 6 hours for 3 days 9/29/17 10/2/17  Shira Griffiths MD   NOVOFINE AUTOCOVER 30G X 8 MM MISC 1 each by Does not apply route 3 times daily 9/27/17   Shira Griffiths MD   nitroGLYCERIN (NITROSTAT) 0.4 MG SL tablet DISSOLVE 1 TAB UNDER THE TONGUE AS NEEDED FOR CHEST PAIN EVERY 5 MINUTES UP TO 3 TIMES.  IF NO RELIEF CALL 911. 8/31/17   Shira Griffiths MD   NYSTATIN 049038 UNIT/GM powder APPLY TO ABDOMINAL FOLDS EVERY 12 HOURS AS NEEDED 8/31/17   Shira Griffiths MD   omeprazole (PRILOSEC) 20 MG delayed release capsule TAKE (1) CAPSULE BY MOUTH DAILY AS NEEDED FOR HEATBURN 8/31/17   Shira Griffiths MD   docusate sodium (COLACE) 100 MG capsule TAKE (1) CAPSULE BY MOUTH TWICE DAILY 8/31/17   Shira Griffiths MD   gabapentin (NEURONTIN) 300 MG capsule TAKE (1) CAPSULE BY MOUTH TWICE DAILY 8/31/17   Shira Griffiths MD   atorvastatin (LIPITOR) 40 MG tablet TAKE (1) TABLET BY MOUTH NIGHTLY 8/31/17   Shira Griffiths MD   traZODone (DESYREL) 50 MG tablet TAKE 1-2 TABLETS BY MOUTH DAILY AT BEDTIME AS NEEDED FOR INSOMNIA 8/31/17   Shira Griffiths MD   ASPIRIN LOW DOSE

## 2017-10-16 NOTE — PROGRESS NOTES
dizziness, headaches, mood changes, nervousness/anxiousness, seizures or tremors. Associated symptoms include fatigue, foot paresthesias, polyphagia and weakness (left side of the body). Pertinent negatives for diabetes include no chest pain, no foot ulcerations, no polydipsia, no polyuria and no weight loss. There are no hypoglycemic complications. Symptoms are improving. Diabetic complications include a CVA, heart disease, nephropathy and peripheral neuropathy. Risk factors for coronary artery disease include post-menopausal, sedentary lifestyle, obesity, hypertension and diabetes mellitus. Current diabetic treatment includes insulin injections and oral agent (monotherapy). She is compliant with treatment most of the time. Insulin injections are given by patient. Her weight is fluctuating minimally. She is following a generally healthy diet. When asked about meal planning, she reported none. She never participates in exercise. She monitors blood glucose at home 1-2 x per day. There is no change in her home blood glucose trend. Her breakfast blood glucose range is generally 110-130 mg/dl. Her dinner blood glucose range is generally 180-200 mg/dl. She sees a podiatrist.Eye exam is not current. More detail above in the chief complaint(s) and below in the review of systems.      Past Medical History:   Diagnosis Date    Anxiety     CAD S/P percutaneous coronary angioplasty 2015    stent per dr Beulah Mcdonald pontine myelinolysis [facility]) 2013    Diabetic nephropathy with proteinuria ([facility] 2014    DJD (degenerative joint disease) of knee     Dr Peggy Miguel GERD (gastroesophageal reflux disease)     Hemiparesis, left ([facility] 2013    [facility] Ass[facility] ([facility])    History of seizures     History of type C viral hepatitis     HTN (hypertension)     Hyperlipidemia     Impaired mobility and activities of daily living     Mediastinal lymphadenopathy 2013    Dr Dixie Goldman    Metabolic syndrome documented. ALLERGIES    Codeine    Current Outpatient Prescriptions on File Prior to Visit   Medication Sig Dispense Refill    lactobacillus acidophilus [facility]) Take 4 tablets by mouth 3 times daily 20 tablet 0    cefUROXime (CEFTIN) 500 MG tablet Take 0.5 tablets by mouth 2 times daily for 3 days 6 tablet 0    NOVOFINE AUTOCOVER 30G X 8 MM MISC 1 each by Does not apply route 3 times daily 100 each 3    nitroGLYCERIN (NITROSTAT) 0.4 MG SL tablet DISSOLVE 1 TAB UNDER THE TONGUE AS NEEDED FOR CHEST PAIN EVERY 5 MINUTES UP TO 3 TIMES.  IF NO RELIEF CALL 911. 75 tablet 0    NYSTATIN 917891 UNIT/GM powder APPLY TO ABDOMINAL FOLDS EVERY 12 HOURS AS NEEDED 15 g 5    omeprazole (PRILOSEC) 20 MG delayed release capsule TAKE (1) CAPSULE BY MOUTH DAILY AS NEEDED FOR HEATBURN 90 capsule 1    docusate sodium (COLACE) 100 MG capsule TAKE (1) CAPSULE BY MOUTH TWICE DAILY 180 capsule 1    gabapentin (NEURONTIN) 300 MG capsule TAKE (1) CAPSULE BY MOUTH TWICE DAILY 180 capsule 1    atorvastatin (LIPITOR) 40 MG tablet TAKE (1) TABLET BY MOUTH NIGHTLY 90 tablet 1    traZODone (DESYREL) 50 MG tablet TAKE 1-2 TABLETS BY MOUTH DAILY AT BEDTIME AS NEEDED FOR INSOMNIA 180 tablet 1    ASPIRIN LOW DOSE 81 MG EC tablet TAKE (1) TABLET BY MOUTH DAILY 90 tablet 1    Insulin Pen Needle (BD AUTOSHIELD DUO) 30G X 5 MM MISC 1 each by Does not apply route 2 times daily For use with Lantus Solostar, DX: E11.29, IDDM 100 each 3    FREESTYLE LITE strip TEST every 6 hours 100 each 3    ARIPiprazole (ABILIFY) 10 MG tablet Take 1 tablet by mouth daily 30 tablet 3    insulin aspart (NOVOLOG FLEXPEN) 100 UNIT/ML injection pen Inject 20 Units into the skin 3 times daily (before meals) 5 Pen 3    MAPAP 500 MG tablet TAKE 1 TABLET BY MOUTH EVERY 6 HOURS AS NEEDED FOR PAIN OR FEVER 120 tablet 5    LANTUS SOLOSTAR 100 UNIT/ML injection pen INJECT 50 UNITS INTO THE SKIN TWICE DAILY 15 mL 10    Alcohol Swabs (EASY TOUCH ALCOHOL PREP MEDIUM) 70 % PADS USE AS DIRECTED THREE TIMES A  each 3    TRULICITY 2.55 MI/7.2SI SOPN INJECT 0.5ML SUBCUTANEOUSLY ONCE WEEKLY AS DIRECTED 2 mL 10    Blood Glucose Monitoring Suppl (FREESTYLE LITE) CORETTA use as directed  0    FREESTYLE LANCETS MISC TEST every 6 hours  0    carvedilol (COREG) 12.5 MG tablet Take 1 tablet by mouth 2 times daily 60 tablet 1    clopidogrel (PLAVIX) 75 MG tablet Take 1 tablet by mouth nightly 30 tablet 1    oxybutynin (DITROPAN XL) 10 MG extended release tablet Take one po bid 180 tablet 3    Vitamin D, Cholecalciferol, 1000 UNITS CAPS Take 1,000 Units by mouth daily 90 capsule 1    dicyclomine (BENTYL) 20 MG tablet Take 1 tablet by mouth every 6 hours for 3 days 12 tablet 0     No current facility-administered medications on file prior to visit. Review of Systems   Constitutional: Positive for fatigue. Negative for activity change, appetite change, malaise/fatigue, unexpected weight change and weight loss. HENT: Negative for congestion, trouble swallowing and voice change. Eyes: Negative for pain. Respiratory: Negative for cough, choking, chest tightness and shortness of breath. Cardiovascular: Negative for chest pain, palpitations, orthopnea, leg swelling and PND. Gastrointestinal: Negative for abdominal distention and abdominal pain. Endocrine: Positive for polyphagia. Negative for cold intolerance, heat intolerance, polydipsia and polyuria. Genitourinary: Negative for dysuria, flank pain and hematuria. Musculoskeletal: Positive for gait problem. Negative for myalgias, neck pain and neck stiffness. Neurological: Positive for weakness (left side of the body). Negative for dizziness, tremors, seizures, syncope, light-headedness, numbness and headaches. Psychiatric/Behavioral: Negative for confusion, decreased concentration, dysphoric mood and sleep disturbance. The patient is not nervous/anxious.         Vitals:    10/16/17 1220   BP: 120/70   Site:

## 2017-10-16 NOTE — PROGRESS NOTES
Physical Therapy  Facility/Department: [facility] MED SURG [id number]  Physical Therapy Discharge      NAME: Westley Chen    : 1960 (20 y.o.)  MRN: [id number]    Account: [de-identified]  Gender: female      Patient has been discharged from acute care hospital. DC patient from current PT program.    Electronically signed by Vinicius Caraballo PT on 10/16/17 at 8:03 AM

## 2017-10-23 ENCOUNTER — CARE COORDINATION (OUTPATIENT)
Dept: CASE MANAGEMENT | Age: 57
End: 2017-10-23

## 2017-10-23 NOTE — CARE COORDINATION
[facility] Transitions Follow Up Call    10/23/2017    Patient: Jose Angel Lauren  Patient : 1960   MRN: [id number]  Reason for Admission: 10/8-10/13/17 [address] Syncope & Collapse; LUCÍA; UTI; Hypotension  Discharge Date: 10/13/17 RARS: Risk Score: 25.5     Spoke with: Michelle. Pt is in good spirits. She denies feelings of anxiety at this time. This AM BS was 109. Pt reports stable BS in low 100s range. She is following a low carb diet. She states she has all her diabetic supplies and medications available. She is monitoring her premeal BS TID. She has not had any further syncope events or falls. She is ambulating with walker. She does not have home BP monitor but shares [facility] nurse stated her vitals have been good. She lives home alone but states her DTR is helpful. Active with [facility] and [facility] services for therapy and skilled. Deanne Garcia following for [facility] services. Pt expresses no home needs or concerns. Advised final CTC call. Non-face-to-face services provided:  Assessment and support for treatment adherence and medication management-diabetes. Inpatient Assessment  Care Transitions Subsequent and Final Call    Subsequent and Final Calls  Do you have any ongoing symptoms?:  No  Do you have any questions related to your medications?:  No  Do you currently have any active services?:  Yes  Are you currently active with any services?:  Home Health  Do you have any needs or concerns that I can assist you with?:  No  Identified Barriers:  Stress  Care Transitions Interventions  Other Interventions:             Follow Up  Future Appointments  Date Time Provider Aminata Cortes   2018 9:30 AM Kristen Shrestha MD [facility] [place]   2018 11:30 AM Jose Luis Copeland MD [address] [place] [address]

## 2017-11-06 DIAGNOSIS — E78.2 MIXED HYPERLIPIDEMIA: Chronic | ICD-10-CM

## 2017-11-06 DIAGNOSIS — I10 ESSENTIAL HYPERTENSION: Chronic | ICD-10-CM

## 2017-11-06 DIAGNOSIS — I25.10 CORONARY ARTERY DISEASE INVOLVING NATIVE HEART WITHOUT ANGINA PECTORIS, UNSPECIFIED VESSEL OR LESION TYPE: Chronic | ICD-10-CM

## 2017-11-06 RX ORDER — CARVEDILOL 12.5 MG/1
TABLET ORAL
Qty: 180 TABLET | Refills: 10 | Status: SHIPPED | OUTPATIENT
Start: 2017-11-06 | End: 2018-03-07 | Stop reason: SDUPTHER

## 2017-11-06 RX ORDER — CLOPIDOGREL BISULFATE 75 MG/1
TABLET ORAL
Qty: 90 TABLET | Refills: 10 | Status: SHIPPED | OUTPATIENT
Start: 2017-11-06 | End: 2018-06-21

## 2017-11-06 RX ORDER — MELATONIN
Qty: 90 TABLET | Refills: 10 | Status: SHIPPED | OUTPATIENT
Start: 2017-11-06 | End: 2018-11-14 | Stop reason: SDUPTHER

## 2017-11-08 ENCOUNTER — TELEPHONE (OUTPATIENT)
Dept: INTERNAL MEDICINE | Age: 57
End: 2017-11-08

## 2017-11-08 RX ORDER — NITROFURANTOIN 25; 75 MG/1; MG/1
100 CAPSULE ORAL 2 TIMES DAILY
Qty: 14 CAPSULE | Refills: 0 | Status: SHIPPED | OUTPATIENT
Start: 2017-11-08 | End: 2017-11-15

## 2017-11-15 DIAGNOSIS — R19.7 DIARRHEA OF PRESUMED INFECTIOUS ORIGIN: Primary | ICD-10-CM

## 2017-11-21 ENCOUNTER — CARE COORDINATION (OUTPATIENT)
Dept: CARE COORDINATION | Age: 57
End: 2017-11-21

## 2017-11-21 NOTE — CARE COORDINATION
Management tool to seek urgent or emergent care. I will notify my provider of any symptoms that indicate a worsening of my condition. Barriers: overwhelmed by complexity of regimen  Plan for overcoming my barriers: N/A  Confidence: 7/10  Anticipated Goal Completion Date: 8/31/2017         Reduce Falls    On track (11/21/2017)             I will reduce my risk of falls by the following: Use walking aids like cane or walker  Follow through on orders for PT    Barriers: lack of education  Plan for overcoming my barriers: N/A  Confidence: 8/10  Anticipated Goal Completion Date: 12/31/2017            Prior to Admission medications    Medication Sig Start Date End Date Taking? Authorizing Provider   carvedilol (COREG) 12.5 MG tablet TAKE (1) TABLET BY MOUTH TWICE DAILY 11/6/17   Jean Claude Weaver MD   Cholecalciferol (VITAMIN D3) 1000 units TABS TAKE (1) TABLET BY MOUTH DAILY 11/6/17   Jean Claude Weaver MD   clopidogrel (PLAVIX) 75 MG tablet TAKE (1) TABLET BY MOUTH NIGHTLY 11/6/17   Jean Claude Weaver MD   cyanocobalamin ([facility] VITAMIN B12) 1000 MCG tablet Take 1 tablet by mouth daily 10/16/17   Jean Claude Weaver MD   lisinopril (ZESTRIL) 2.5 MG tablet Take 1 tablet by mouth daily 10/16/17   Jean Claude Weaver MD   sertraline (ZOLOFT) 50 MG tablet Take 1 tablet by mouth daily 10/16/17   Jean Claude Weaver MD   lactobacillus acidophilus [facility]) Take 4 tablets by mouth 3 times daily 10/13/17   Yoli Trejo MD   NOVOFINE AUTOCOVER 30G X 8 MM MISC 1 each by Does not apply route 3 times daily 9/27/17   Jean Claude Weaver MD   nitroGLYCERIN (NITROSTAT) 0.4 MG SL tablet DISSOLVE 1 TAB UNDER THE TONGUE AS NEEDED FOR CHEST PAIN EVERY 5 MINUTES UP TO 3 TIMES.  IF NO RELIEF CALL 911. 8/31/17   Jean Claude Weaver MD   NYSTATIN 810557 UNIT/GM powder APPLY TO ABDOMINAL FOLDS EVERY 12 HOURS AS NEEDED 8/31/17   Jean Claude Weaver MD   omeprazole (PRILOSEC) 20 MG delayed release capsule TAKE (1) CAPSULE BY MOUTH DAILY AS NEEDED FOR HEATBURN 8/31/17

## 2017-12-06 DIAGNOSIS — I10 ESSENTIAL HYPERTENSION: Chronic | ICD-10-CM

## 2017-12-06 DIAGNOSIS — E11.40 CONTROLLED TYPE 2 DIABETES MELLITUS WITH DIABETIC NEUROPATHY, WITH LONG-TERM CURRENT USE OF INSULIN (HCC): Chronic | ICD-10-CM

## 2017-12-06 DIAGNOSIS — Z79.4 CONTROLLED TYPE 2 DIABETES MELLITUS WITH DIABETIC NEUROPATHY, WITH LONG-TERM CURRENT USE OF INSULIN (HCC): Chronic | ICD-10-CM

## 2017-12-06 LAB
ANION GAP SERPL CALCULATED.3IONS-SCNC: 16 MEQ/L (ref 7–13)
BUN BLDV-MCNC: 24 MG/DL (ref 6–20)
CALCIUM SERPL-MCNC: 9.8 MG/DL (ref 8.6–10.2)
CHLORIDE BLD-SCNC: 104 MEQ/L (ref 98–107)
CO2: 19 MEQ/L (ref 22–29)
CREAT SERPL-MCNC: 1.36 MG/DL (ref 0.5–0.9)
GFR AFRICAN AMERICAN: 48
GFR NON-AFRICAN AMERICAN: 39.7
GLUCOSE BLD-MCNC: 190 MG/DL (ref 74–109)
POTASSIUM SERPL-SCNC: 4.9 MEQ/L (ref 3.5–5.1)
SODIUM BLD-SCNC: 139 MEQ/L (ref 132–144)

## 2017-12-08 ENCOUNTER — TELEPHONE (OUTPATIENT)
Dept: INTERNAL MEDICINE | Age: 57
End: 2017-12-08

## 2017-12-08 DIAGNOSIS — N30.00 ACUTE CYSTITIS WITHOUT HEMATURIA: Primary | ICD-10-CM

## 2017-12-08 RX ORDER — NITROFURANTOIN 25; 75 MG/1; MG/1
100 CAPSULE ORAL 2 TIMES DAILY
Qty: 14 CAPSULE | Refills: 0 | Status: SHIPPED | OUTPATIENT
Start: 2017-12-08 | End: 2017-12-15

## 2017-12-11 DIAGNOSIS — Z79.4 CONTROLLED TYPE 2 DIABETES MELLITUS WITH DIABETIC NEUROPATHY, WITH LONG-TERM CURRENT USE OF INSULIN (HCC): Primary | ICD-10-CM

## 2017-12-11 DIAGNOSIS — E11.40 CONTROLLED TYPE 2 DIABETES MELLITUS WITH DIABETIC NEUROPATHY, WITH LONG-TERM CURRENT USE OF INSULIN (HCC): Primary | ICD-10-CM

## 2017-12-11 PROCEDURE — 83036 HEMOGLOBIN GLYCOSYLATED A1C: CPT | Performed by: INTERNAL MEDICINE

## 2017-12-28 RX ORDER — ACETAMINOPHEN 500 MG
TABLET ORAL
Qty: 120 TABLET | Refills: 1 | Status: SHIPPED | OUTPATIENT
Start: 2017-12-28 | End: 2018-02-27 | Stop reason: SDUPTHER

## 2018-01-04 ENCOUNTER — CARE COORDINATION (OUTPATIENT)
Dept: CARE COORDINATION | Age: 58
End: 2018-01-04

## 2018-01-04 NOTE — CARE COORDINATION
Ambulatory Care Coordination Note  1/4/2018  CM Risk Score: 9  Andrés Mortality Risk Score:      ACC: Luis F Celaya RN    Summary Note: Patient reports 3-4 episodes of hypoglycemia in the past 2 weeks. Discussed causes of hypoglycemia and prevention. Patient feels that episodes may be related to skipping/delay of meals. Discussed prevention of hypoglycemia including small frequent meals/snacks. Instructed patient to bring blood sugar log with her to upcoming appointment with PCP. Patient verbalizes understanding. Discussed meet with her in coordination of visit to provided additional education and educational handouts. Diabetes Assessment    Medic Alert ID:  No  Meal Planning:  Plate Method, Avoidance of concentrated sweets, Carb counting   How often do you test your blood sugar?:  Meals, Bedtime   Do you have barriers with adherence to non-pharmacologic self-management interventions? (Nutrition/Exercise/Self-Monitoring):  No   Have you ever had to go to the ED for symptoms of low blood sugar?:  No       Blood Sugars less than 70   Do you have hyperglycemia symptoms?:  No   Do you have hypoglycemia symptoms?:  Yes   Frequency of Episodes:  2 Per:  Week   Last Blood Sugar Value:  135   Blood Sugar Monitoring Regimen:  Before Meals, At Bedtime   Blood Sugar Trends:  Fluctuating        Care Coordination Interventions    Program Enrollment:  Complex Care  Referral from Primary Care Provider:  Yes  Suggested Interventions and Community Resources  Diabetes Education:  Completed (Comment: Patient recently completed Diabetes Management Program)  Fall Risk Prevention: In Process  Home Care Waiver:  Completed  Home Health Services: In Process (Comment: Resume Home Health add PT.)  Meals on Wheels:  Completed  Physical Therapy: In Process (Comment: Home Health PT )  Zone Management Tools:   In Process (Comment: Diabetes Zones)  Other Services or Interventions:  Assist in obtaining medical supplies/equipment to assist

## 2018-01-09 ENCOUNTER — OFFICE VISIT (OUTPATIENT)
Dept: INTERNAL MEDICINE | Age: 58
End: 2018-01-09

## 2018-01-09 ENCOUNTER — CARE COORDINATOR VISIT (OUTPATIENT)
Dept: CARE COORDINATION | Age: 58
End: 2018-01-09

## 2018-01-09 VITALS
DIASTOLIC BLOOD PRESSURE: 60 MMHG | HEIGHT: 67 IN | OXYGEN SATURATION: 98 % | BODY MASS INDEX: 34.69 KG/M2 | WEIGHT: 221 LBS | SYSTOLIC BLOOD PRESSURE: 110 MMHG | TEMPERATURE: 99.3 F | HEART RATE: 88 BPM

## 2018-01-09 DIAGNOSIS — G81.94: ICD-10-CM

## 2018-01-09 DIAGNOSIS — E11.42 DIABETIC PERIPHERAL NEUROPATHY ASSOCIATED WITH TYPE 2 DIABETES MELLITUS (HCC): ICD-10-CM

## 2018-01-09 DIAGNOSIS — F20.9 SCHIZOPHRENIA, UNSPECIFIED TYPE (HCC): ICD-10-CM

## 2018-01-09 DIAGNOSIS — R06.09 EXERTIONAL DYSPNEA: ICD-10-CM

## 2018-01-09 DIAGNOSIS — Z79.4 CONTROLLED TYPE 2 DIABETES MELLITUS WITH DIABETIC NEUROPATHY, WITH LONG-TERM CURRENT USE OF INSULIN (HCC): Primary | ICD-10-CM

## 2018-01-09 DIAGNOSIS — S98.131S: ICD-10-CM

## 2018-01-09 DIAGNOSIS — I25.119 CORONARY ARTERY DISEASE INVOLVING NATIVE HEART WITH ANGINA PECTORIS, UNSPECIFIED VESSEL OR LESION TYPE (HCC): Chronic | ICD-10-CM

## 2018-01-09 DIAGNOSIS — I67.89: ICD-10-CM

## 2018-01-09 DIAGNOSIS — E11.40 CONTROLLED TYPE 2 DIABETES MELLITUS WITH DIABETIC NEUROPATHY, WITH LONG-TERM CURRENT USE OF INSULIN (HCC): Primary | ICD-10-CM

## 2018-01-09 PROCEDURE — 3046F HEMOGLOBIN A1C LEVEL >9.0%: CPT | Performed by: INTERNAL MEDICINE

## 2018-01-09 PROCEDURE — 3017F COLORECTAL CA SCREEN DOC REV: CPT | Performed by: INTERNAL MEDICINE

## 2018-01-09 PROCEDURE — G8598 ASA/ANTIPLAT THER USED: HCPCS | Performed by: INTERNAL MEDICINE

## 2018-01-09 PROCEDURE — 1036F TOBACCO NON-USER: CPT | Performed by: INTERNAL MEDICINE

## 2018-01-09 PROCEDURE — G8427 DOCREV CUR MEDS BY ELIG CLIN: HCPCS | Performed by: INTERNAL MEDICINE

## 2018-01-09 PROCEDURE — 99213 OFFICE O/P EST LOW 20 MIN: CPT | Performed by: INTERNAL MEDICINE

## 2018-01-09 PROCEDURE — G8484 FLU IMMUNIZE NO ADMIN: HCPCS | Performed by: INTERNAL MEDICINE

## 2018-01-09 PROCEDURE — 3014F SCREEN MAMMO DOC REV: CPT | Performed by: INTERNAL MEDICINE

## 2018-01-09 PROCEDURE — G8417 CALC BMI ABV UP PARAM F/U: HCPCS | Performed by: INTERNAL MEDICINE

## 2018-01-09 ASSESSMENT — PATIENT HEALTH QUESTIONNAIRE - PHQ9
SUM OF ALL RESPONSES TO PHQ QUESTIONS 1-9: 0
2. FEELING DOWN, DEPRESSED OR HOPELESS: 0
SUM OF ALL RESPONSES TO PHQ9 QUESTIONS 1 & 2: 0
1. LITTLE INTEREST OR PLEASURE IN DOING THINGS: 0

## 2018-01-09 ASSESSMENT — ENCOUNTER SYMPTOMS
GASTROINTESTINAL NEGATIVE: 1
BLURRED VISION: 0
CHEST TIGHTNESS: 0
TROUBLE SWALLOWING: 0
WHEEZING: 0
COUGH: 0
VOICE CHANGE: 0
ABDOMINAL PAIN: 0
ORTHOPNEA: 0
EYE PAIN: 0
ABDOMINAL DISTENTION: 0
SHORTNESS OF BREATH: 1
CHOKING: 0

## 2018-01-09 NOTE — PROGRESS NOTES
Dwight Dewey is a 59 y.o. female with history of psychosis, CVA, central pontine myelinolysis, obesity, impaired mobility, coronary artery disease, who presents with     Chief Complaint   Patient presents with    Diabetes     fasting home , A1C 6.4 in Oct.    Shortness of Breath     Since the past office visit, the patient has remained independent. She comes to the office visit today accompanied by her daughter. No hospital admissions or emergency room visits. The following laboratory reports since the past visit were reviewed (the ones pertinent to today's visit were discussed with the patient):    Orders Only on 01/09/2018   Component Date Value Ref Range Status    Sodium 01/09/2018 141  132 - 144 mEq/L Final    Comment: Revert to previous reference range. Effective:  12/14/2017      Potassium 01/09/2018 5.3* 3.5 - 5.1 mEq/L Final    Comment: Revert to previous reference range. Effective:  12/14/2017      Chloride 01/09/2018 106  98 - 107 mEq/L Final    Comment: Revert to previous reference range. Effective:  12/14/2017      CO2 01/09/2018 21* 22 - 29 mEq/L Final    Comment: Revert to previous reference range. Effective:  12/14/2017      Anion Gap 01/09/2018 14* 7 - 13 mEq/L Final    Comment: Revert to previous reference range. Effective:  12/14/2017      Glucose 01/09/2018 73* 74 - 109 mg/dL Final    BUN 01/09/2018 33* 6 - 20 mg/dL Final    CREATININE 01/09/2018 1.68* 0.50 - 0.90 mg/dL Final    GFR Non- 01/09/2018 31.1* >60 Final    Comment: >60 mL/min/1.73m2 EGFR, calc. for ages 25 and older using the  MDRD formula (not corrected for weight), is valid for stable  renal function.  GFR  01/09/2018 37.6* >60 Final    Comment: >60 mL/min/1.73m2 EGFR, calc. for ages 25 and older using the  MDRD formula (not corrected for weight), is valid for stable  renal function.       Calcium 01/09/2018 10.4* 8.6 - 10.2 mg/dL Final    Total Protein 01/09/2018 7.1 over 200 results. HPI:    Diabetes   She presents for her follow-up diabetic visit. She has type 2 diabetes mellitus. Her disease course has been fluctuating. Hypoglycemia symptoms include dizziness. Pertinent negatives for hypoglycemia include no confusion, headaches, nervousness/anxiousness, seizures or tremors. Associated symptoms include fatigue (when minimal exertion), foot paresthesias, weakness (left side of the body) and weight loss. Pertinent negatives for diabetes include no blurred vision, no chest pain, no foot ulcerations, no polydipsia and no polyuria. There are no hypoglycemic complications. Symptoms are stable. Diabetic complications include nephropathy and peripheral neuropathy. Risk factors for coronary artery disease include diabetes mellitus, hypertension, obesity, sedentary lifestyle and post-menopausal. Current diabetic treatment includes insulin injections and diet. She is compliant with treatment all of the time. Her weight is decreasing steadily. She is following a generally healthy diet. Meal planning includes avoidance of concentrated sweets. She has had a previous visit with a dietitian. She rarely participates in exercise. Her home blood glucose trend is fluctuating minimally. Her overall blood glucose range is 140-180 mg/dl. An ACE inhibitor/angiotensin II receptor blocker is being taken. Shortness of Breath   This is a recurrent problem. The current episode started more than 1 month ago. The problem occurs daily. The problem has been unchanged. Pertinent negatives include no abdominal pain, chest pain, claudication, headaches, leg swelling, neck pain, orthopnea, PND, rash, syncope or wheezing. The symptoms are aggravated by any activity. She has tried rest for the symptoms. The treatment provided no relief. Her past medical history is significant for CAD. There is no history of asthma, COPD, DVT, a heart failure, PE or a recent surgery.        More detail above in the chief Other Topics Concern    Not on file     Social History Narrative    Born in [place], one of [address] in touch with twin sister Princess Lowry to [place], , 2 children    Worked at [employer] due to mental illness    Lived at [employer], was discharged, returned to independent living in 2017 and has adjusted well    One son and one daughter, keep in touch       Family History   Problem Relation Age of Onset    Cancer Mother 76     survived   [employer] Hypertension Father     Diabetes Sister     Mental Illness Sister        Family and social history were reviewed and pertinent changes documented.     ALLERGIES    Codeine    Current Outpatient Prescriptions on File Prior to Visit   Medication Sig Dispense Refill    MAPAP 500 MG tablet TAKE 1 TABLET BY MOUTH EVERY 6 HOURS AS NEEDED FOR PAIN OR FEVER 120 tablet 1    NOVOFINE AUTOCOVER 30G X 8 MM MISC USE AS DIRECTED THREE TIMES A  each 1    NOVOLOG FLEXPEN 100 UNIT/ML injection pen INJECT 20 UNITS SUBCUTANEOUSLY THREE TIMES A DAY BEFORE MEALS 15 mL 2    nitroGLYCERIN (NITROSTAT) 0.4 MG SL tablet DISSOLVE 1 TAB UNDER THE TONGUE AS NEEDED FOR CHEST PAIN EVERY 5 MINUTES UP TO 3 TIMES IF NO RELIEF CALL 911 75 tablet 2    carvedilol (COREG) 12.5 MG tablet TAKE (1) TABLET BY MOUTH TWICE DAILY 180 tablet 10    Cholecalciferol (VITAMIN D3) 1000 units TABS TAKE (1) TABLET BY MOUTH DAILY 90 tablet 10    clopidogrel (PLAVIX) 75 MG tablet TAKE (1) TABLET BY MOUTH NIGHTLY 90 tablet 10    cyanocobalamin ([facility]S VITAMIN B12) 1000 MCG tablet Take 1 tablet by mouth daily 30 tablet 5    lisinopril (ZESTRIL) 2.5 MG tablet Take 1 tablet by mouth daily 30 tablet 5    sertraline (ZOLOFT) 50 MG tablet Take 1 tablet by mouth daily 30 tablet 5    lactobacillus acidophilus (FLORANEX) Take 4 tablets by mouth 3 times daily 20 tablet 0    NYSTATIN 132653 UNIT/GM powder APPLY TO ABDOMINAL FOLDS EVERY 12 HOURS AS NEEDED 15 g 5    omeprazole (PRILOSEC) 20 MG delayed release capsule TAKE (1) CAPSULE BY MOUTH DAILY AS NEEDED FOR HEATBURN 90 capsule 1    docusate sodium (COLACE) 100 MG capsule TAKE (1) CAPSULE BY MOUTH TWICE DAILY 180 capsule 1    gabapentin (NEURONTIN) 300 MG capsule TAKE (1) CAPSULE BY MOUTH TWICE DAILY 180 capsule 1    atorvastatin (LIPITOR) 40 MG tablet TAKE (1) TABLET BY MOUTH NIGHTLY 90 tablet 1    traZODone (DESYREL) 50 MG tablet TAKE 1-2 TABLETS BY MOUTH DAILY AT BEDTIME AS NEEDED FOR INSOMNIA 180 tablet 1    ASPIRIN LOW DOSE 81 MG EC tablet TAKE (1) TABLET BY MOUTH DAILY 90 tablet 1    FREESTYLE LITE strip TEST every 6 hours 100 each 3    ARIPiprazole (ABILIFY) 10 MG tablet Take 1 tablet by mouth daily 30 tablet 3    Alcohol Swabs (EASY TOUCH ALCOHOL PREP MEDIUM) 70 % PADS USE AS DIRECTED THREE TIMES A  each 3    TRULICITY 8.63 HY/3.4JR SOPN INJECT 0.5ML SUBCUTANEOUSLY ONCE WEEKLY AS DIRECTED 2 mL 10    Blood Glucose Monitoring Suppl (FREESTYLE LITE) CORETTA use as directed  0    FREESTYLE LANCETS MISC TEST every 6 hours  0    oxybutynin (DITROPAN XL) 10 MG extended release tablet Take one po bid 180 tablet 3     No current facility-administered medications on file prior to visit. Review of Systems   Constitutional: Positive for fatigue (when minimal exertion) and weight loss. Negative for activity change, appetite change and unexpected weight change. HENT: Negative for congestion, nosebleeds, postnasal drip, trouble swallowing and voice change. Eyes: Negative for blurred vision and pain. Respiratory: Positive for shortness of breath. Negative for cough, choking, chest tightness and wheezing. Cardiovascular: Negative for chest pain, palpitations, orthopnea, claudication, leg swelling, syncope and PND. Gastrointestinal: Negative. Negative for abdominal distention and abdominal pain. Endocrine: Negative. Negative for cold intolerance, heat intolerance, polydipsia and polyuria.    Genitourinary:

## 2018-01-09 NOTE — CARE COORDINATION
nitroGLYCERIN (NITROSTAT) 0.4 MG SL tablet DISSOLVE 1 TAB UNDER THE TONGUE AS NEEDED FOR CHEST PAIN EVERY 5 MINUTES UP TO 3 TIMES IF NO RELIEF CALL 911 11/29/17   Papito Brown MD   carvedilol (COREG) 12.5 MG tablet TAKE (1) TABLET BY MOUTH TWICE DAILY 11/6/17   Papito Brown MD   Cholecalciferol (VITAMIN D3) 1000 units TABS TAKE (1) TABLET BY MOUTH DAILY 11/6/17   Papito Brown MD   clopidogrel (PLAVIX) 75 MG tablet TAKE (1) TABLET BY MOUTH NIGHTLY 11/6/17   Papito Brown MD   cyanocobalamin (CVS VITAMIN B12) 1000 MCG tablet Take 1 tablet by mouth daily 10/16/17   Papito Brown MD   lisinopril (ZESTRIL) 2.5 MG tablet Take 1 tablet by mouth daily 10/16/17   Papito Brown MD   sertraline (ZOLOFT) 50 MG tablet Take 1 tablet by mouth daily 10/16/17   Papito Brown MD   lactobacillus acidophilus [facility]) Take 4 tablets by mouth 3 times daily 10/13/17   Jabier Benitez MD   NYSTATIN 219138 UNIT/GM powder APPLY TO ABDOMINAL FOLDS EVERY 12 HOURS AS NEEDED 8/31/17   Papito Brown MD   omeprazole (PRILOSEC) 20 MG delayed release capsule TAKE (1) CAPSULE BY MOUTH DAILY AS NEEDED FOR HEATBURN 8/31/17   Papito Brown MD   docusate sodium (COLACE) 100 MG capsule TAKE (1) CAPSULE BY MOUTH TWICE DAILY 8/31/17   Papito Brown MD   gabapentin (NEURONTIN) 300 MG capsule TAKE (1) CAPSULE BY MOUTH TWICE DAILY 8/31/17   Papito Brown MD   atorvastatin (LIPITOR) 40 MG tablet TAKE (1) TABLET BY MOUTH NIGHTLY 8/31/17   Papito Brown MD   traZODone (DESYREL) 50 MG tablet TAKE 1-2 TABLETS BY MOUTH DAILY AT BEDTIME AS NEEDED FOR INSOMNIA 8/31/17   Papito Brown MD   ASPIRIN LOW DOSE 81 MG EC tablet TAKE (1) TABLET BY MOUTH DAILY 8/31/17   Papito Brown MD   FREESTYLE LITE strip TEST every 6 hours 8/15/17   Papito Brown MD   ARIPiprazole (ABILIFY) 10 MG tablet Take 1 tablet by mouth daily 8/1/17   Papito Brown MD   Alcohol Swabs (EASY TOUCH ALCOHOL PREP MEDIUM) 70 % PADS USE AS DIRECTED THREE TIMES A DAY

## 2018-01-26 RX ORDER — INSULIN GLARGINE 100 [IU]/ML
INJECTION, SOLUTION SUBCUTANEOUS
Qty: 15 ML | Refills: 10 | Status: SHIPPED | OUTPATIENT
Start: 2018-01-26 | End: 2018-02-23 | Stop reason: SDUPTHER

## 2018-01-30 ENCOUNTER — OFFICE VISIT (OUTPATIENT)
Dept: CARDIOLOGY CLINIC | Age: 58
End: 2018-01-30
Payer: MEDICARE

## 2018-01-30 ENCOUNTER — TELEPHONE (OUTPATIENT)
Dept: ENDOCRINOLOGY | Age: 58
End: 2018-01-30

## 2018-01-30 VITALS
OXYGEN SATURATION: 98 % | RESPIRATION RATE: 16 BRPM | TEMPERATURE: 97.8 F | HEART RATE: 79 BPM | BODY MASS INDEX: 34.53 KG/M2 | DIASTOLIC BLOOD PRESSURE: 56 MMHG | SYSTOLIC BLOOD PRESSURE: 102 MMHG | HEIGHT: 67 IN | WEIGHT: 220 LBS

## 2018-01-30 DIAGNOSIS — I25.119 CORONARY ARTERY DISEASE INVOLVING NATIVE HEART WITH ANGINA PECTORIS, UNSPECIFIED VESSEL OR LESION TYPE (HCC): Chronic | ICD-10-CM

## 2018-01-30 DIAGNOSIS — E78.2 MIXED HYPERLIPIDEMIA: Chronic | ICD-10-CM

## 2018-01-30 DIAGNOSIS — I10 ESSENTIAL HYPERTENSION: Chronic | ICD-10-CM

## 2018-01-30 DIAGNOSIS — R06.09 DYSPNEA ON EXERTION: Primary | ICD-10-CM

## 2018-01-30 PROCEDURE — G8417 CALC BMI ABV UP PARAM F/U: HCPCS | Performed by: INTERNAL MEDICINE

## 2018-01-30 PROCEDURE — G8427 DOCREV CUR MEDS BY ELIG CLIN: HCPCS | Performed by: INTERNAL MEDICINE

## 2018-01-30 PROCEDURE — 3017F COLORECTAL CA SCREEN DOC REV: CPT | Performed by: INTERNAL MEDICINE

## 2018-01-30 PROCEDURE — G8484 FLU IMMUNIZE NO ADMIN: HCPCS | Performed by: INTERNAL MEDICINE

## 2018-01-30 PROCEDURE — 1036F TOBACCO NON-USER: CPT | Performed by: INTERNAL MEDICINE

## 2018-01-30 PROCEDURE — 99214 OFFICE O/P EST MOD 30 MIN: CPT | Performed by: INTERNAL MEDICINE

## 2018-01-30 PROCEDURE — G8598 ASA/ANTIPLAT THER USED: HCPCS | Performed by: INTERNAL MEDICINE

## 2018-01-30 PROCEDURE — 3014F SCREEN MAMMO DOC REV: CPT | Performed by: INTERNAL MEDICINE

## 2018-01-30 RX ORDER — ISOSORBIDE MONONITRATE 30 MG/1
30 TABLET, EXTENDED RELEASE ORAL DAILY
Qty: 30 TABLET | Refills: 3 | Status: SHIPPED | OUTPATIENT
Start: 2018-01-30 | End: 2018-06-14 | Stop reason: ALTCHOICE

## 2018-01-30 ASSESSMENT — ENCOUNTER SYMPTOMS
CHOKING: 0
APNEA: 0
COLOR CHANGE: 0
ABDOMINAL PAIN: 0
CHEST TIGHTNESS: 0
ABDOMINAL DISTENTION: 0
BACK PAIN: 0
SHORTNESS OF BREATH: 1

## 2018-01-30 NOTE — PROGRESS NOTES
Past Surgical History:   Procedure Laterality Date     SECTION      x1    COLONOSCOPY  2014    Dr. Fisher Antis      x1 Dr. Yudith Hurtado, TOTAL ABDOMINAL      one ovary intact, Dr Essie Pizarro, menorrhagia    TOE AMPUTATION Right     [facility]  16     [facility]       Family History   Problem Relation Age of Onset    Cancer Mother 76     survived   [employer] Hypertension Father     Diabetes Sister     Mental Illness Sister        Social History     Social History    Marital status:      Spouse name: N/A    Number of children: 2    Years of education: N/A     Occupational History    disabled      Social History Main Topics    Smoking status: Passive Smoke Exposure - Never Smoker    Smokeless tobacco: Never Used    Alcohol use No    Drug use: No    Sexual activity: Not Currently     Other Topics Concern    None     Social History Narrative    Born in [place], one of 5    Keeps in touch with twin sister Lucia stewart [facility], , 2 children    Worked at [employer] due to mental illness    Lived at [employer], was discharged, returned to independent living in 2017 and has adjusted well    One son and one daughter, keep in touch       Allergies   Allergen Reactions    Codeine Hives     hives       Current Outpatient Prescriptions   Medication Sig Dispense Refill    isosorbide mononitrate (IMDUR) 30 MG extended release tablet Take 1 tablet by mouth daily 30 tablet 3    LANTUS SOLOSTAR 100 UNIT/ML injection pen INJECT 50 UNITS SUBCUTANEOUSLY TWICE DAILY 15 mL 10    MAPAP 500 MG tablet TAKE 1 TABLET BY MOUTH EVERY 6 HOURS AS NEEDED FOR PAIN OR FEVER 120 tablet 1    NOVOFINE AUTOCOVER 30G X 8 MM MISC USE AS DIRECTED THREE TIMES A  each 1    NOVOLOG FLEXPEN 100 UNIT/ML injection pen INJECT 20 UNITS SUBCUTANEOUSLY THREE TIMES A DAY BEFORE MEALS 15 mL 2    nitroGLYCERIN noted.       LABS:  CBC:   Lab Results   Component Value Date    WBC 8.6 01/09/2018    RBC 3.81 01/09/2018    HGB 10.9 01/09/2018    HCT 32.3 01/09/2018    MCV 84.7 01/09/2018    MCH 28.6 01/09/2018    MCHC 33.7 01/09/2018    RDW 14.8 01/09/2018     01/09/2018    MPV 9.3 08/07/2015     Lipids:  Lab Results   Component Value Date    CHOL 93 10/09/2017    CHOL 99 10/08/2017    CHOL 177 01/30/2017     Lab Results   Component Value Date    TRIG 176 10/09/2017    TRIG 142 10/08/2017    TRIG 216 01/30/2017     Lab Results   Component Value Date    HDL 29 (L) 10/09/2017    HDL 33 (L) 10/08/2017    HDL 41 01/30/2017     Lab Results   Component Value Date    LDLCALC 29 10/09/2017    LDLCALC 38 10/08/2017    LDLCALC 93 01/30/2017     Lab Results   Component Value Date    LABVLDL 59.0 05/04/2013    VLDL 43 01/30/2017     Lab Results   Component Value Date    CHOLHDLRATIO 4.32 01/30/2017     CMP:    Lab Results   Component Value Date     01/09/2018    K 5.3 01/09/2018     01/09/2018    CO2 21 01/09/2018    BUN 33 01/09/2018    CREATININE 1.68 01/09/2018    GFRAA 37.6 01/09/2018    LABGLOM 31.1 01/09/2018    GLUCOSE 73 01/09/2018    PROT 7.1 01/09/2018    LABALBU 4.0 01/09/2018    CALCIUM 10.4 01/09/2018    BILITOT 0.3 01/09/2018    ALKPHOS 94 01/09/2018    AST 26 01/09/2018    ALT 24 01/09/2018     BMP:    Lab Results   Component Value Date     01/09/2018    K 5.3 01/09/2018     01/09/2018    CO2 21 01/09/2018    BUN 33 01/09/2018    LABALBU 4.0 01/09/2018    CREATININE 1.68 01/09/2018    CALCIUM 10.4 01/09/2018    GFRAA 37.6 01/09/2018    LABGLOM 31.1 01/09/2018    GLUCOSE 73 01/09/2018     Magnesium:    Lab Results   Component Value Date    MG 2.1 10/08/2017     TSH:  Lab Results   Component Value Date    TSH 0.574 10/08/2017       Patient Active Problem List   Diagnosis    CAD (coronary artery disease)    Schizophrenia, paranoid, chronic    Metabolic syndrome    Obesity    Vitamin B 12 Counseling:  Heart Healthy Lifestyle and Improve BMI    Return in about 4 weeks (around 2/27/2018) for followup cath.       Electronically signed by Charles Saunders MD on 1/30/2018 at 10:45 AM

## 2018-02-09 ENCOUNTER — HOSPITAL ENCOUNTER (OUTPATIENT)
Dept: CARDIAC CATH/INVASIVE PROCEDURES | Age: 58
Discharge: HOME | End: 2018-02-09
Attending: INTERNAL MEDICINE | Admitting: INTERNAL MEDICINE
Payer: MEDICARE

## 2018-02-09 VITALS
HEART RATE: 68 BPM | RESPIRATION RATE: 10 BRPM | OXYGEN SATURATION: 100 % | HEIGHT: 66 IN | DIASTOLIC BLOOD PRESSURE: 92 MMHG | SYSTOLIC BLOOD PRESSURE: 128 MMHG | WEIGHT: 210 LBS | TEMPERATURE: 97 F | BODY MASS INDEX: 33.75 KG/M2

## 2018-02-09 DIAGNOSIS — I25.119 CORONARY ARTERY DISEASE INVOLVING NATIVE HEART WITH ANGINA PECTORIS, UNSPECIFIED VESSEL OR LESION TYPE (HCC): Chronic | ICD-10-CM

## 2018-02-09 DIAGNOSIS — R06.09 DYSPNEA ON EXERTION: ICD-10-CM

## 2018-02-09 LAB
ANION GAP SERPL CALCULATED.3IONS-SCNC: 14 MEQ/L (ref 7–13)
BUN BLDV-MCNC: 31 MG/DL (ref 8–23)
CALCIUM SERPL-MCNC: 10.2 MG/DL (ref 8.6–10.2)
CHLORIDE BLD-SCNC: 103 MEQ/L (ref 98–107)
CO2: 22 MEQ/L (ref 22–29)
CREAT SERPL-MCNC: 1.63 MG/DL (ref 0.5–0.9)
GFR AFRICAN AMERICAN: 38.9
GFR NON-AFRICAN AMERICAN: 32.2
GLUCOSE BLD-MCNC: 125 MG/DL (ref 74–109)
HCT VFR BLD CALC: 30.9 % (ref 37–47)
HEMOGLOBIN: 10.5 G/DL (ref 12–16)
MCH RBC QN AUTO: 29.1 PG (ref 27–31.3)
MCHC RBC AUTO-ENTMCNC: 34.1 % (ref 33–37)
MCV RBC AUTO: 85.4 FL (ref 82–100)
PDW BLD-RTO: 14.6 % (ref 11.5–14.5)
PLATELET # BLD: 206 K/UL (ref 130–400)
POTASSIUM SERPL-SCNC: 5.5 MEQ/L (ref 3.5–5.1)
RBC # BLD: 3.61 M/UL (ref 4.2–5.4)
SODIUM BLD-SCNC: 139 MEQ/L (ref 132–144)
WBC # BLD: 9.2 K/UL (ref 4.8–10.8)

## 2018-02-09 PROCEDURE — C1769 GUIDE WIRE: HCPCS

## 2018-02-09 PROCEDURE — 93458 L HRT ARTERY/VENTRICLE ANGIO: CPT | Performed by: INTERNAL MEDICINE

## 2018-02-09 PROCEDURE — 2500000003 HC RX 250 WO HCPCS

## 2018-02-09 PROCEDURE — 2580000003 HC RX 258

## 2018-02-09 PROCEDURE — 2580000003 HC RX 258: Performed by: INTERNAL MEDICINE

## 2018-02-09 PROCEDURE — 85027 COMPLETE CBC AUTOMATED: CPT

## 2018-02-09 PROCEDURE — 6360000002 HC RX W HCPCS

## 2018-02-09 PROCEDURE — C1725 CATH, TRANSLUMIN NON-LASER: HCPCS

## 2018-02-09 PROCEDURE — C1894 INTRO/SHEATH, NON-LASER: HCPCS

## 2018-02-09 PROCEDURE — 80048 BASIC METABOLIC PNL TOTAL CA: CPT

## 2018-02-09 RX ORDER — SODIUM CHLORIDE 9 MG/ML
INJECTION, SOLUTION INTRAVENOUS CONTINUOUS
Status: CANCELLED | OUTPATIENT
Start: 2018-02-09 | End: 2018-02-09

## 2018-02-09 RX ORDER — SODIUM CHLORIDE 0.9 % (FLUSH) 0.9 %
10 SYRINGE (ML) INJECTION PRN
Status: DISCONTINUED | OUTPATIENT
Start: 2018-02-09 | End: 2018-02-09

## 2018-02-09 RX ORDER — ONDANSETRON 2 MG/ML
4 INJECTION INTRAMUSCULAR; INTRAVENOUS EVERY 6 HOURS PRN
Status: CANCELLED | OUTPATIENT
Start: 2018-02-09

## 2018-02-09 RX ORDER — ACETAMINOPHEN 325 MG/1
650 TABLET ORAL EVERY 4 HOURS PRN
Status: CANCELLED | OUTPATIENT
Start: 2018-02-09

## 2018-02-09 RX ORDER — SODIUM CHLORIDE 9 MG/ML
INJECTION, SOLUTION INTRAVENOUS CONTINUOUS
Status: DISCONTINUED | OUTPATIENT
Start: 2018-02-09 | End: 2018-02-09

## 2018-02-09 RX ORDER — ALPRAZOLAM 0.5 MG/1
0.5 TABLET ORAL
Status: DISCONTINUED | OUTPATIENT
Start: 2018-02-09 | End: 2018-02-09 | Stop reason: HOSPADM

## 2018-02-09 RX ORDER — SODIUM CHLORIDE 0.9 % (FLUSH) 0.9 %
10 SYRINGE (ML) INJECTION EVERY 12 HOURS SCHEDULED
Status: DISCONTINUED | OUTPATIENT
Start: 2018-02-09 | End: 2018-02-09

## 2018-02-09 RX ADMIN — SODIUM CHLORIDE: 9 INJECTION, SOLUTION INTRAVENOUS at 08:32

## 2018-02-09 NOTE — PROGRESS NOTES
Received from cath lab. Pt is awake, able to respond verbally, appropriate. Radial band in place right radial with good hemostasis, msp's intact distally with brisk capillary refill. MP nsr, no ectopics. Resting quietly when not verbally stimulated. IV patent with 800 cc TRISTON.

## 2018-02-14 DIAGNOSIS — I25.119 CORONARY ARTERY DISEASE INVOLVING NATIVE CORONARY ARTERY OF NATIVE HEART WITH ANGINA PECTORIS (HCC): Primary | ICD-10-CM

## 2018-02-16 ENCOUNTER — HOSPITAL ENCOUNTER (OUTPATIENT)
Dept: CARDIAC CATH/INVASIVE PROCEDURES | Age: 58
Discharge: HOME | End: 2018-02-17
Attending: INTERNAL MEDICINE | Admitting: INTERNAL MEDICINE
Payer: MEDICARE

## 2018-02-16 PROBLEM — I25.10 CAD IN NATIVE ARTERY: Status: ACTIVE | Noted: 2018-02-16

## 2018-02-16 LAB
ANION GAP SERPL CALCULATED.3IONS-SCNC: 14 MEQ/L (ref 7–13)
BUN BLDV-MCNC: 32 MG/DL (ref 8–23)
CALCIUM SERPL-MCNC: 10 MG/DL (ref 8.6–10.2)
CHLORIDE BLD-SCNC: 104 MEQ/L (ref 98–107)
CO2: 22 MEQ/L (ref 22–29)
CREAT SERPL-MCNC: 1.44 MG/DL (ref 0.5–0.9)
GFR AFRICAN AMERICAN: 44.9
GFR NON-AFRICAN AMERICAN: 37.1
GLUCOSE BLD-MCNC: 196 MG/DL (ref 74–109)
GLUCOSE BLD-MCNC: 197 MG/DL (ref 60–115)
GLUCOSE BLD-MCNC: 296 MG/DL (ref 60–115)
HBA1C MFR BLD: 5.7 % (ref 4.8–5.9)
HCT VFR BLD CALC: 30.7 % (ref 37–47)
HEMOGLOBIN: 10.7 G/DL (ref 12–16)
MCH RBC QN AUTO: 29.4 PG (ref 27–31.3)
MCHC RBC AUTO-ENTMCNC: 34.7 % (ref 33–37)
MCV RBC AUTO: 84.5 FL (ref 82–100)
PDW BLD-RTO: 14.5 % (ref 11.5–14.5)
PERFORMED ON: ABNORMAL
PLATELET # BLD: 199 K/UL (ref 130–400)
POC ACTIVATED CLOTTING TIME KAOLIN: 202 SEC (ref 82–152)
POC ACTIVATED CLOTTING TIME KAOLIN: 224 SEC (ref 82–152)
POC SAMPLE TYPE: ABNORMAL
POC SAMPLE TYPE: ABNORMAL
POTASSIUM REFLEX MAGNESIUM: 5 MEQ/L (ref 3.5–5.1)
RBC # BLD: 3.63 M/UL (ref 4.2–5.4)
SODIUM BLD-SCNC: 140 MEQ/L (ref 132–144)
WBC # BLD: 10.3 K/UL (ref 4.8–10.8)

## 2018-02-16 PROCEDURE — 85027 COMPLETE CBC AUTOMATED: CPT

## 2018-02-16 PROCEDURE — 92928 PRQ TCAT PLMT NTRAC ST 1 LES: CPT | Performed by: INTERNAL MEDICINE

## 2018-02-16 PROCEDURE — 6370000000 HC RX 637 (ALT 250 FOR IP): Performed by: NURSE PRACTITIONER

## 2018-02-16 PROCEDURE — C1894 INTRO/SHEATH, NON-LASER: HCPCS

## 2018-02-16 PROCEDURE — 2720000001 HC MISC SURG SUPPLY STERILE $51-500

## 2018-02-16 PROCEDURE — C9600 PERC DRUG-EL COR STENT SING: HCPCS | Performed by: INTERNAL MEDICINE

## 2018-02-16 PROCEDURE — 80048 BASIC METABOLIC PNL TOTAL CA: CPT

## 2018-02-16 PROCEDURE — 85347 COAGULATION TIME ACTIVATED: CPT

## 2018-02-16 PROCEDURE — 2580000003 HC RX 258: Performed by: INTERNAL MEDICINE

## 2018-02-16 PROCEDURE — 6370000000 HC RX 637 (ALT 250 FOR IP): Performed by: INTERNAL MEDICINE

## 2018-02-16 PROCEDURE — C1887 CATHETER, GUIDING: HCPCS

## 2018-02-16 PROCEDURE — C1769 GUIDE WIRE: HCPCS

## 2018-02-16 PROCEDURE — 2580000003 HC RX 258

## 2018-02-16 PROCEDURE — 83036 HEMOGLOBIN GLYCOSYLATED A1C: CPT

## 2018-02-16 PROCEDURE — C1874 STENT, COATED/COV W/DEL SYS: HCPCS

## 2018-02-16 PROCEDURE — 6360000002 HC RX W HCPCS

## 2018-02-16 PROCEDURE — 2500000003 HC RX 250 WO HCPCS

## 2018-02-16 PROCEDURE — C1725 CATH, TRANSLUMIN NON-LASER: HCPCS

## 2018-02-16 RX ORDER — ASPIRIN 81 MG/1
81 TABLET, CHEWABLE ORAL ONCE
Status: COMPLETED | OUTPATIENT
Start: 2018-02-16 | End: 2018-02-16

## 2018-02-16 RX ORDER — ASPIRIN 81 MG/1
81 TABLET ORAL ONCE
Status: COMPLETED | OUTPATIENT
Start: 2018-02-16 | End: 2018-02-17

## 2018-02-16 RX ORDER — OXYBUTYNIN CHLORIDE 5 MG/1
10 TABLET, EXTENDED RELEASE ORAL 2 TIMES DAILY
Status: DISCONTINUED | OUTPATIENT
Start: 2018-02-16 | End: 2018-02-17 | Stop reason: HOSPADM

## 2018-02-16 RX ORDER — DOCUSATE SODIUM 100 MG/1
100 CAPSULE, LIQUID FILLED ORAL 2 TIMES DAILY
Status: DISCONTINUED | OUTPATIENT
Start: 2018-02-16 | End: 2018-02-17 | Stop reason: HOSPADM

## 2018-02-16 RX ORDER — ISOSORBIDE MONONITRATE 30 MG/1
30 TABLET, EXTENDED RELEASE ORAL DAILY
Status: DISCONTINUED | OUTPATIENT
Start: 2018-02-16 | End: 2018-02-17 | Stop reason: HOSPADM

## 2018-02-16 RX ORDER — ONDANSETRON 2 MG/ML
4 INJECTION INTRAMUSCULAR; INTRAVENOUS EVERY 6 HOURS PRN
Status: DISCONTINUED | OUTPATIENT
Start: 2018-02-16 | End: 2018-02-17 | Stop reason: HOSPADM

## 2018-02-16 RX ORDER — SODIUM CHLORIDE 9 MG/ML
INJECTION, SOLUTION INTRAVENOUS CONTINUOUS
Status: DISPENSED | OUTPATIENT
Start: 2018-02-16 | End: 2018-02-16

## 2018-02-16 RX ORDER — NICOTINE POLACRILEX 4 MG
15 LOZENGE BUCCAL PRN
Status: DISCONTINUED | OUTPATIENT
Start: 2018-02-16 | End: 2018-02-17 | Stop reason: HOSPADM

## 2018-02-16 RX ORDER — ACETAMINOPHEN 325 MG/1
650 TABLET ORAL EVERY 4 HOURS PRN
Status: DISCONTINUED | OUTPATIENT
Start: 2018-02-16 | End: 2018-02-17 | Stop reason: HOSPADM

## 2018-02-16 RX ORDER — CLOPIDOGREL BISULFATE 75 MG/1
75 TABLET ORAL DAILY
Status: DISCONTINUED | OUTPATIENT
Start: 2018-02-17 | End: 2018-02-17 | Stop reason: HOSPADM

## 2018-02-16 RX ORDER — PANTOPRAZOLE SODIUM 40 MG/1
40 TABLET, DELAYED RELEASE ORAL
Status: DISCONTINUED | OUTPATIENT
Start: 2018-02-17 | End: 2018-02-17 | Stop reason: HOSPADM

## 2018-02-16 RX ORDER — ATORVASTATIN CALCIUM 40 MG/1
40 TABLET, FILM COATED ORAL NIGHTLY
Status: DISCONTINUED | OUTPATIENT
Start: 2018-02-16 | End: 2018-02-17 | Stop reason: HOSPADM

## 2018-02-16 RX ORDER — DEXTROSE MONOHYDRATE 50 MG/ML
100 INJECTION, SOLUTION INTRAVENOUS PRN
Status: DISCONTINUED | OUTPATIENT
Start: 2018-02-16 | End: 2018-02-17 | Stop reason: HOSPADM

## 2018-02-16 RX ORDER — DEXTROSE MONOHYDRATE 25 G/50ML
12.5 INJECTION, SOLUTION INTRAVENOUS PRN
Status: DISCONTINUED | OUTPATIENT
Start: 2018-02-16 | End: 2018-02-17 | Stop reason: HOSPADM

## 2018-02-16 RX ORDER — L. ACIDOPHILUS/L.BULGARICUS 1MM CELL
4 TABLET ORAL 3 TIMES DAILY
Status: DISCONTINUED | OUTPATIENT
Start: 2018-02-16 | End: 2018-02-17 | Stop reason: HOSPADM

## 2018-02-16 RX ORDER — CLOPIDOGREL BISULFATE 75 MG/1
75 TABLET ORAL ONCE
Status: COMPLETED | OUTPATIENT
Start: 2018-02-16 | End: 2018-02-16

## 2018-02-16 RX ORDER — INSULIN GLARGINE 100 [IU]/ML
40 INJECTION, SOLUTION SUBCUTANEOUS 2 TIMES DAILY
Status: DISCONTINUED | OUTPATIENT
Start: 2018-02-16 | End: 2018-02-17 | Stop reason: HOSPADM

## 2018-02-16 RX ORDER — SODIUM CHLORIDE 9 MG/ML
INJECTION, SOLUTION INTRAVENOUS CONTINUOUS
Status: DISCONTINUED | OUTPATIENT
Start: 2018-02-16 | End: 2018-02-16

## 2018-02-16 RX ORDER — TRAZODONE HYDROCHLORIDE 50 MG/1
50 TABLET ORAL NIGHTLY
Status: DISCONTINUED | OUTPATIENT
Start: 2018-02-16 | End: 2018-02-17 | Stop reason: HOSPADM

## 2018-02-16 RX ORDER — SODIUM CHLORIDE 0.9 % (FLUSH) 0.9 %
10 SYRINGE (ML) INJECTION PRN
Status: DISCONTINUED | OUTPATIENT
Start: 2018-02-16 | End: 2018-02-17 | Stop reason: HOSPADM

## 2018-02-16 RX ORDER — NITROGLYCERIN 0.4 MG/1
0.4 TABLET SUBLINGUAL EVERY 5 MIN PRN
Status: DISCONTINUED | OUTPATIENT
Start: 2018-02-16 | End: 2018-02-17 | Stop reason: HOSPADM

## 2018-02-16 RX ORDER — CARVEDILOL 12.5 MG/1
12.5 TABLET ORAL 2 TIMES DAILY
Status: DISCONTINUED | OUTPATIENT
Start: 2018-02-16 | End: 2018-02-17 | Stop reason: HOSPADM

## 2018-02-16 RX ORDER — ARIPIPRAZOLE 5 MG/1
10 TABLET ORAL DAILY
Status: DISCONTINUED | OUTPATIENT
Start: 2018-02-16 | End: 2018-02-17 | Stop reason: HOSPADM

## 2018-02-16 RX ORDER — GABAPENTIN 300 MG/1
300 CAPSULE ORAL 2 TIMES DAILY
Status: DISCONTINUED | OUTPATIENT
Start: 2018-02-16 | End: 2018-02-17 | Stop reason: HOSPADM

## 2018-02-16 RX ADMIN — SODIUM CHLORIDE: 9 INJECTION, SOLUTION INTRAVENOUS at 08:10

## 2018-02-16 RX ADMIN — OXYBUTYNIN CHLORIDE 10 MG: 5 TABLET, FILM COATED, EXTENDED RELEASE ORAL at 15:21

## 2018-02-16 RX ADMIN — ATORVASTATIN CALCIUM 40 MG: 40 TABLET, FILM COATED ORAL at 20:25

## 2018-02-16 RX ADMIN — GABAPENTIN 300 MG: 300 CAPSULE ORAL at 20:25

## 2018-02-16 RX ADMIN — DOCUSATE SODIUM 100 MG: 100 CAPSULE, LIQUID FILLED ORAL at 20:25

## 2018-02-16 RX ADMIN — GABAPENTIN 300 MG: 300 CAPSULE ORAL at 15:22

## 2018-02-16 RX ADMIN — TRAZODONE HYDROCHLORIDE 50 MG: 50 TABLET ORAL at 20:25

## 2018-02-16 RX ADMIN — OXYBUTYNIN CHLORIDE 10 MG: 5 TABLET, FILM COATED, EXTENDED RELEASE ORAL at 20:24

## 2018-02-16 RX ADMIN — INSULIN GLARGINE 40 UNITS: 100 INJECTION, SOLUTION SUBCUTANEOUS at 22:16

## 2018-02-16 RX ADMIN — CARVEDILOL 12.5 MG: 12.5 TABLET, FILM COATED ORAL at 20:25

## 2018-02-16 RX ADMIN — CLOPIDOGREL BISULFATE 75 MG: 75 TABLET ORAL at 08:09

## 2018-02-16 RX ADMIN — SODIUM CHLORIDE: 9 INJECTION, SOLUTION INTRAVENOUS at 15:21

## 2018-02-16 RX ADMIN — CARVEDILOL 12.5 MG: 12.5 TABLET, FILM COATED ORAL at 15:22

## 2018-02-16 RX ADMIN — ASPIRIN 81 MG 81 MG: 81 TABLET ORAL at 08:10

## 2018-02-16 RX ADMIN — LACTOBACILLUS TAB 4 TABLET: TAB at 20:25

## 2018-02-16 ASSESSMENT — PAIN SCALES - GENERAL: PAINLEVEL_OUTOF10: 0

## 2018-02-16 NOTE — BRIEF OP NOTE
Brief Postoperative Note    [facility]  [facility] 1960  [id number]    Pre-operative Diagnosis: CAD, mid/distal LAD severe ISR    Post-operative Diagnosis: Same    Procedure: PCI mid/distal LAD with DEWEY X 1    Anesthesia: Moderate Sedation    Surgeons/Assistants: Fernando    Estimated Blood Loss: minimal    Complications: None    Specimens: Was Not Obtained    Findings: Successful PCI mid/distal LAD with DEWEY xience 2.25/8 postdil 2.25 NC    Electronically signed by Erin Orosco MD on 2/16/2018 at 9:33 AM

## 2018-02-16 NOTE — PROGRESS NOTES
Patient returned from the cath lab with a D STAT to the right wrist which is intact. No family here.   Will order a breakfast tray

## 2018-02-16 NOTE — CONSULTS
Cardiac Rehab info given. Discussed benefits of exercise.   Scheduled to start outpatient program 2/23/18 @ 10am.

## 2018-02-17 VITALS
OXYGEN SATURATION: 99 % | HEIGHT: 67 IN | HEART RATE: 64 BPM | RESPIRATION RATE: 18 BRPM | WEIGHT: 218.26 LBS | DIASTOLIC BLOOD PRESSURE: 65 MMHG | BODY MASS INDEX: 34.26 KG/M2 | SYSTOLIC BLOOD PRESSURE: 138 MMHG | TEMPERATURE: 97.8 F

## 2018-02-17 LAB
GLUCOSE BLD-MCNC: 213 MG/DL (ref 60–115)
GLUCOSE BLD-MCNC: 238 MG/DL (ref 60–115)
PERFORMED ON: ABNORMAL
PERFORMED ON: ABNORMAL

## 2018-02-17 PROCEDURE — 6370000000 HC RX 637 (ALT 250 FOR IP): Performed by: NURSE PRACTITIONER

## 2018-02-17 PROCEDURE — 99217 PR OBSERVATION CARE DISCHARGE MANAGEMENT: CPT | Performed by: INTERNAL MEDICINE

## 2018-02-17 RX ADMIN — INSULIN GLARGINE 40 UNITS: 100 INJECTION, SOLUTION SUBCUTANEOUS at 08:10

## 2018-02-17 RX ADMIN — OXYBUTYNIN CHLORIDE 10 MG: 5 TABLET, FILM COATED, EXTENDED RELEASE ORAL at 08:13

## 2018-02-17 RX ADMIN — GABAPENTIN 300 MG: 300 CAPSULE ORAL at 08:15

## 2018-02-17 RX ADMIN — ISOSORBIDE MONONITRATE 30 MG: 30 TABLET, EXTENDED RELEASE ORAL at 08:15

## 2018-02-17 RX ADMIN — ARIPIPRAZOLE 10 MG: 5 TABLET ORAL at 08:15

## 2018-02-17 RX ADMIN — DOCUSATE SODIUM 100 MG: 100 CAPSULE, LIQUID FILLED ORAL at 08:15

## 2018-02-17 RX ADMIN — ASPIRIN 81 MG: 81 TABLET, COATED ORAL at 08:16

## 2018-02-17 RX ADMIN — PANTOPRAZOLE SODIUM 40 MG: 40 TABLET, DELAYED RELEASE ORAL at 05:37

## 2018-02-17 RX ADMIN — CLOPIDOGREL BISULFATE 75 MG: 75 TABLET ORAL at 08:14

## 2018-02-17 RX ADMIN — SERTRALINE HYDROCHLORIDE 50 MG: 50 TABLET ORAL at 08:15

## 2018-02-17 RX ADMIN — CARVEDILOL 12.5 MG: 12.5 TABLET, FILM COATED ORAL at 08:15

## 2018-02-17 RX ADMIN — LACTOBACILLUS TAB 4 TABLET: TAB at 08:13

## 2018-02-17 ASSESSMENT — PAIN SCALES - GENERAL: PAINLEVEL_OUTOF10: 0

## 2018-02-17 NOTE — PLAN OF CARE
Problem: Safety:  Goal: Free from accidental physical injury  Free from accidental physical injury  Outcome: Ongoing    Goal: Free from intentional harm  Free from intentional harm  Outcome: Ongoing      Problem: Pain:  Goal: Patient's pain/discomfort is manageable  Patient's pain/discomfort is manageable  Outcome: Ongoing

## 2018-02-17 NOTE — DISCHARGE SUMMARY
Cardiology Discharge Summary      Patient Identification:  Hugo Cook  : 1960  MRN: [id number]   Account: [de-identified]     Admit date: 2018  Discharge date: [unfilled]   Attending provider: Eve Love MD        Primary care provider: Domi Roy MD     Admission Diagnoses:  CAD in native artery         Discharge Diagnoses: Active Hospital Problems    Diagnosis Date Noted    CAD in native artery [I25.10] 2018     Priority: High    Angina, class III ([address] [I20.9]     HTN (hypertension) [I10]     Mixed hyperlipidemia [E78.2] 2010          Hospital Course:   Hugo Cook is a 59 y.o. female admitted to [facility] on 2018 for post PCI care, s/p PCI of the mid LAD with DEWEY. Procedures:   1. PCI of LAD      Consults:   IP CONSULT TO CARDIAC REHAB    Examination:  /65   Pulse 64   Temp 97.8 °F (36.6 °C) (Oral)   Resp 18   Ht 5' 7\" (1.702 m)   Wt 218 lb 4.1 oz (99 kg)   SpO2 99%   BMI 34.18 kg/m²    Physical Exam   Constitutional: She appears healthy. No distress. HENT:   Mouth/Throat: Oropharynx is clear. Eyes: Pupils are equal, round, and reactive to light. Neck: Normal range of motion. No JVD present. Cardiovascular: Regular rhythm, S1 normal, S2 normal, normal heart sounds and intact distal pulses. Exam reveals no gallop. No murmur heard. Pulses:       Radial pulses are 2+ on the right side. Dorsalis pedis pulses are 2+ on the right side. Pulmonary/Chest: Effort normal and breath sounds normal. She has no wheezes. She has no rales. She exhibits no tenderness. Abdominal: Soft. Bowel sounds are normal.   Musculoskeletal: Normal range of motion. She exhibits no edema. Neurological: She is alert and oriented to person, place, and time. She has intact cranial nerves. Skin: Skin is warm and dry. No rash noted.        Medications:  Current

## 2018-02-21 ENCOUNTER — HOSPITAL ENCOUNTER (OUTPATIENT)
Dept: CARDIAC REHAB | Age: 58
Setting detail: THERAPIES SERIES
Discharge: HOME | DRG: 683 | End: 2018-02-21
Payer: MEDICARE

## 2018-02-21 PROCEDURE — 93798 PHYS/QHP OP CAR RHAB W/ECG: CPT

## 2018-02-21 NOTE — PROCEDURES
[address]                       [address], [address] [facility]                              CARDIAC CATHETERIZATION    PATIENT NAME: James Marion                  :        1960  MED REC NO:   [id number]                            ROOM:       X871  ACCOUNT NO:   [de-identified]                           ADMIT DATE: 2018  PROVIDER:     Tennille Serrato MD    DATE OF PROCEDURE:  2018    PROCEDURE PERFORMED:  Percutaneous coronary intervention of the mid distal  LAD with drug-eluting stent implantation. INDICATIONS:  Angina pectoris; CCS class III, coronary artery disease with  previous stent. REFERRING PHYSICIAN:  Laura Thorne DO and Tennille Serrato MD.    PROCEDURE DETAILS:  Following full informed consent, the patient was  brought to the cardiac catheterization laboratory, where sterile prep and  drape administered in the usual fashion. Anesthesia was obtained in the  right wrist with lidocaine after administration of conscious sedation. The  right radial 6-Thai arterial sheath was placed without complication. Nitroglycerin and nicardipine were given via the sheath and heparin  anticoagulation was used, ACT greater than 250. Percutaneous coronary  intervention was performed through a 6-Thai XB 3.0 guide catheter and  using a hydrophilic wire. The lesion was pre-dilated with a compliant  balloon and stented with a drug-coated stent, proximal to the previously  placed stent and the previously placed stents were postdilated with a  noncompliant balloon at high pressure. Completion angiography was  performed and the guidewire was removed without complication. The sheath  was removed with D-Stat radial device used for hemostasis.     PCI NOTE:  Successful percutaneous coronary intervention of the mid distal  LAD, 99% in-stent restenosis, and distal edge stenosis reduced to 0%  residual with BHUMIKA 3 flow after dilatation with a 2.0 balloon, stenting  with the Xience 2.25 x 8 drug-eluting stent and postdilatation with the  2.25 noncompliant balloon at high pressure. CONCLUSION:  Successful PCI with DEWEY, mid distal LAD for in-stent  restenosis.         Lauren Bucio MD    D: 02/21/2018 8:26:39       T: 02/21/2018 10:09:20     [id number]  Job#: [id number]     Doc#: [id number]    CC:

## 2018-02-23 ENCOUNTER — APPOINTMENT (OUTPATIENT)
Dept: GENERAL RADIOLOGY | Age: 58
DRG: 683 | End: 2018-02-23
Payer: MEDICARE

## 2018-02-23 ENCOUNTER — HOSPITAL ENCOUNTER (INPATIENT)
Age: 58
LOS: 1 days | Discharge: HOME | DRG: 683 | End: 2018-02-25
Attending: INTERNAL MEDICINE | Admitting: INTERNAL MEDICINE
Payer: MEDICARE

## 2018-02-23 ENCOUNTER — CARE COORDINATOR VISIT (OUTPATIENT)
Dept: CARE COORDINATION | Age: 58
End: 2018-02-23

## 2018-02-23 ENCOUNTER — HOSPITAL ENCOUNTER (OUTPATIENT)
Dept: CARDIAC REHAB | Age: 58
Setting detail: THERAPIES SERIES
Discharge: HOME | DRG: 683 | End: 2018-02-23
Payer: MEDICARE

## 2018-02-23 ENCOUNTER — OFFICE VISIT (OUTPATIENT)
Dept: INTERNAL MEDICINE CLINIC | Age: 58
End: 2018-02-23
Payer: MEDICARE

## 2018-02-23 VITALS
SYSTOLIC BLOOD PRESSURE: 78 MMHG | HEIGHT: 66 IN | TEMPERATURE: 97.5 F | DIASTOLIC BLOOD PRESSURE: 48 MMHG | BODY MASS INDEX: 35.52 KG/M2 | WEIGHT: 221 LBS | HEART RATE: 86 BPM | OXYGEN SATURATION: 93 %

## 2018-02-23 DIAGNOSIS — Z98.61 S/P PTCA (PERCUTANEOUS TRANSLUMINAL CORONARY ANGIOPLASTY): ICD-10-CM

## 2018-02-23 DIAGNOSIS — E86.0 DEHYDRATION: ICD-10-CM

## 2018-02-23 DIAGNOSIS — N18.4 CHRONIC KIDNEY DISEASE (CKD), STAGE IV (SEVERE) (HCC): ICD-10-CM

## 2018-02-23 DIAGNOSIS — R77.8 ELEVATED TROPONIN: Primary | ICD-10-CM

## 2018-02-23 DIAGNOSIS — N17.9 AKI (ACUTE KIDNEY INJURY) (HCC): ICD-10-CM

## 2018-02-23 DIAGNOSIS — I95.9 HYPOTENSION, UNSPECIFIED HYPOTENSION TYPE: Primary | ICD-10-CM

## 2018-02-23 PROBLEM — R79.89 ELEVATED TROPONIN: Status: ACTIVE | Noted: 2018-02-23

## 2018-02-23 LAB
ALBUMIN SERPL-MCNC: 4 G/DL (ref 3.9–4.9)
ALP BLD-CCNC: 95 U/L (ref 40–130)
ALT SERPL-CCNC: 22 U/L (ref 0–33)
ANION GAP SERPL CALCULATED.3IONS-SCNC: 15 MEQ/L (ref 7–13)
AST SERPL-CCNC: 20 U/L (ref 0–35)
BACTERIA: ABNORMAL /HPF
BILIRUB SERPL-MCNC: 0.5 MG/DL (ref 0–1.2)
BILIRUBIN URINE: NEGATIVE
BLOOD, URINE: NEGATIVE
BUN BLDV-MCNC: 48 MG/DL (ref 8–23)
CALCIUM SERPL-MCNC: 10.2 MG/DL (ref 8.6–10.2)
CHLORIDE BLD-SCNC: 101 MEQ/L (ref 98–107)
CLARITY: ABNORMAL
CO2: 21 MEQ/L (ref 22–29)
COLOR: YELLOW
CREAT SERPL-MCNC: 2.05 MG/DL (ref 0.5–0.9)
EPITHELIAL CELLS, UA: ABNORMAL /HPF
GFR AFRICAN AMERICAN: 29.9
GFR NON-AFRICAN AMERICAN: 24.7
GLOBULIN: 2.9 G/DL (ref 2.3–3.5)
GLUCOSE BLD-MCNC: 173 MG/DL (ref 74–109)
GLUCOSE BLD-MCNC: 212 MG/DL (ref 60–115)
GLUCOSE BLD-MCNC: 213 MG/DL (ref 60–115)
GLUCOSE URINE: NEGATIVE MG/DL
HCT VFR BLD CALC: 30.9 % (ref 37–47)
HEMOGLOBIN: 10.6 G/DL (ref 12–16)
KETONES, URINE: NEGATIVE MG/DL
LEUKOCYTE ESTERASE, URINE: ABNORMAL
MCH RBC QN AUTO: 29 PG (ref 27–31.3)
MCHC RBC AUTO-ENTMCNC: 34.2 % (ref 33–37)
MCV RBC AUTO: 84.6 FL (ref 82–100)
NITRITE, URINE: NEGATIVE
PDW BLD-RTO: 14.4 % (ref 11.5–14.5)
PERFORMED ON: ABNORMAL
PERFORMED ON: ABNORMAL
PH UA: 5.5 (ref 5–9)
PLATELET # BLD: 202 K/UL (ref 130–400)
POTASSIUM SERPL-SCNC: 5 MEQ/L (ref 3.5–5.1)
PRO-BNP: 560 PG/ML
PROTEIN UA: NEGATIVE MG/DL
RBC # BLD: 3.65 M/UL (ref 4.2–5.4)
RBC UA: ABNORMAL /HPF (ref 0–2)
SODIUM BLD-SCNC: 137 MEQ/L (ref 132–144)
SPECIFIC GRAVITY UA: 1 (ref 1–1.03)
TOTAL CK: 89 U/L (ref 0–170)
TOTAL PROTEIN: 6.9 G/DL (ref 6.4–8.1)
TROPONIN: 0.01 NG/ML (ref 0–0.01)
TROPONIN: <0.01 NG/ML (ref 0–0.01)
TROPONIN: <0.01 NG/ML (ref 0–0.01)
URINE REFLEX TO CULTURE: YES
UROBILINOGEN, URINE: 0.2 E.U./DL
WBC # BLD: 9.2 K/UL (ref 4.8–10.8)
WBC UA: ABNORMAL /HPF (ref 0–5)

## 2018-02-23 PROCEDURE — G0378 HOSPITAL OBSERVATION PER HR: HCPCS

## 2018-02-23 PROCEDURE — 3014F SCREEN MAMMO DOC REV: CPT | Performed by: INTERNAL MEDICINE

## 2018-02-23 PROCEDURE — 87077 CULTURE AEROBIC IDENTIFY: CPT

## 2018-02-23 PROCEDURE — 6370000000 HC RX 637 (ALT 250 FOR IP): Performed by: INTERNAL MEDICINE

## 2018-02-23 PROCEDURE — 96372 THER/PROPH/DIAG INJ SC/IM: CPT

## 2018-02-23 PROCEDURE — 87186 SC STD MICRODIL/AGAR DIL: CPT

## 2018-02-23 PROCEDURE — 93798 PHYS/QHP OP CAR RHAB W/ECG: CPT

## 2018-02-23 PROCEDURE — 96361 HYDRATE IV INFUSION ADD-ON: CPT

## 2018-02-23 PROCEDURE — 94760 N-INVAS EAR/PLS OXIMETRY 1: CPT

## 2018-02-23 PROCEDURE — 99214 OFFICE O/P EST MOD 30 MIN: CPT | Performed by: INTERNAL MEDICINE

## 2018-02-23 PROCEDURE — 93005 ELECTROCARDIOGRAM TRACING: CPT

## 2018-02-23 PROCEDURE — 83880 ASSAY OF NATRIURETIC PEPTIDE: CPT

## 2018-02-23 PROCEDURE — 1036F TOBACCO NON-USER: CPT | Performed by: INTERNAL MEDICINE

## 2018-02-23 PROCEDURE — 94664 DEMO&/EVAL PT USE INHALER: CPT

## 2018-02-23 PROCEDURE — 80053 COMPREHEN METABOLIC PANEL: CPT

## 2018-02-23 PROCEDURE — 71046 X-RAY EXAM CHEST 2 VIEWS: CPT

## 2018-02-23 PROCEDURE — 36415 COLL VENOUS BLD VENIPUNCTURE: CPT

## 2018-02-23 PROCEDURE — 81001 URINALYSIS AUTO W/SCOPE: CPT

## 2018-02-23 PROCEDURE — 6360000002 HC RX W HCPCS: Performed by: INTERNAL MEDICINE

## 2018-02-23 PROCEDURE — 99285 EMERGENCY DEPT VISIT HI MDM: CPT

## 2018-02-23 PROCEDURE — G8417 CALC BMI ABV UP PARAM F/U: HCPCS | Performed by: INTERNAL MEDICINE

## 2018-02-23 PROCEDURE — 3017F COLORECTAL CA SCREEN DOC REV: CPT | Performed by: INTERNAL MEDICINE

## 2018-02-23 PROCEDURE — 82550 ASSAY OF CK (CPK): CPT

## 2018-02-23 PROCEDURE — 2580000003 HC RX 258: Performed by: PHYSICIAN ASSISTANT

## 2018-02-23 PROCEDURE — 96360 HYDRATION IV INFUSION INIT: CPT

## 2018-02-23 PROCEDURE — G8598 ASA/ANTIPLAT THER USED: HCPCS | Performed by: INTERNAL MEDICINE

## 2018-02-23 PROCEDURE — 85027 COMPLETE CBC AUTOMATED: CPT

## 2018-02-23 PROCEDURE — 84484 ASSAY OF TROPONIN QUANT: CPT

## 2018-02-23 PROCEDURE — 94640 AIRWAY INHALATION TREATMENT: CPT

## 2018-02-23 PROCEDURE — 2580000003 HC RX 258: Performed by: INTERNAL MEDICINE

## 2018-02-23 PROCEDURE — G8427 DOCREV CUR MEDS BY ELIG CLIN: HCPCS | Performed by: INTERNAL MEDICINE

## 2018-02-23 PROCEDURE — 87086 URINE CULTURE/COLONY COUNT: CPT

## 2018-02-23 PROCEDURE — 6370000000 HC RX 637 (ALT 250 FOR IP): Performed by: PHYSICIAN ASSISTANT

## 2018-02-23 PROCEDURE — G8484 FLU IMMUNIZE NO ADMIN: HCPCS | Performed by: INTERNAL MEDICINE

## 2018-02-23 RX ORDER — SODIUM CHLORIDE 450 MG/100ML
INJECTION, SOLUTION INTRAVENOUS CONTINUOUS
Status: DISCONTINUED | OUTPATIENT
Start: 2018-02-23 | End: 2018-02-25 | Stop reason: HOSPADM

## 2018-02-23 RX ORDER — ALBUTEROL SULFATE 2.5 MG/3ML
2.5 SOLUTION RESPIRATORY (INHALATION) EVERY 4 HOURS PRN
Status: DISCONTINUED | OUTPATIENT
Start: 2018-02-23 | End: 2018-02-25 | Stop reason: HOSPADM

## 2018-02-23 RX ORDER — NICOTINE POLACRILEX 4 MG
15 LOZENGE BUCCAL PRN
Status: DISCONTINUED | OUTPATIENT
Start: 2018-02-23 | End: 2018-02-24 | Stop reason: SDUPTHER

## 2018-02-23 RX ORDER — SODIUM CHLORIDE 0.9 % (FLUSH) 0.9 %
10 SYRINGE (ML) INJECTION EVERY 12 HOURS SCHEDULED
Status: DISCONTINUED | OUTPATIENT
Start: 2018-02-23 | End: 2018-02-25 | Stop reason: HOSPADM

## 2018-02-23 RX ORDER — DEXTROSE MONOHYDRATE 50 MG/ML
100 INJECTION, SOLUTION INTRAVENOUS PRN
Status: DISCONTINUED | OUTPATIENT
Start: 2018-02-23 | End: 2018-02-24 | Stop reason: SDUPTHER

## 2018-02-23 RX ORDER — INSULIN GLARGINE 100 [IU]/ML
30 INJECTION, SOLUTION SUBCUTANEOUS NIGHTLY
Status: DISCONTINUED | OUTPATIENT
Start: 2018-02-23 | End: 2018-02-24

## 2018-02-23 RX ORDER — ARIPIPRAZOLE 5 MG/1
10 TABLET ORAL DAILY
Status: DISCONTINUED | OUTPATIENT
Start: 2018-02-23 | End: 2018-02-25 | Stop reason: HOSPADM

## 2018-02-23 RX ORDER — TRAZODONE HYDROCHLORIDE 50 MG/1
50 TABLET ORAL NIGHTLY
Status: DISCONTINUED | OUTPATIENT
Start: 2018-02-23 | End: 2018-02-25 | Stop reason: HOSPADM

## 2018-02-23 RX ORDER — ONDANSETRON 2 MG/ML
4 INJECTION INTRAMUSCULAR; INTRAVENOUS EVERY 6 HOURS PRN
Status: DISCONTINUED | OUTPATIENT
Start: 2018-02-23 | End: 2018-02-23 | Stop reason: SDUPTHER

## 2018-02-23 RX ORDER — ATORVASTATIN CALCIUM 20 MG/1
20 TABLET, FILM COATED ORAL NIGHTLY
Status: DISCONTINUED | OUTPATIENT
Start: 2018-02-23 | End: 2018-02-25 | Stop reason: HOSPADM

## 2018-02-23 RX ORDER — SODIUM CHLORIDE 0.9 % (FLUSH) 0.9 %
10 SYRINGE (ML) INJECTION PRN
Status: DISCONTINUED | OUTPATIENT
Start: 2018-02-23 | End: 2018-02-25 | Stop reason: HOSPADM

## 2018-02-23 RX ORDER — DEXTROSE MONOHYDRATE 25 G/50ML
12.5 INJECTION, SOLUTION INTRAVENOUS PRN
Status: DISCONTINUED | OUTPATIENT
Start: 2018-02-23 | End: 2018-02-24 | Stop reason: SDUPTHER

## 2018-02-23 RX ORDER — ALBUTEROL SULFATE 2.5 MG/3ML
2.5 SOLUTION RESPIRATORY (INHALATION) EVERY 6 HOURS PRN
Status: DISCONTINUED | OUTPATIENT
Start: 2018-02-23 | End: 2018-02-23

## 2018-02-23 RX ORDER — 0.9 % SODIUM CHLORIDE 0.9 %
1000 INTRAVENOUS SOLUTION INTRAVENOUS ONCE
Status: COMPLETED | OUTPATIENT
Start: 2018-02-23 | End: 2018-02-23

## 2018-02-23 RX ORDER — ASPIRIN 81 MG/1
324 TABLET, CHEWABLE ORAL ONCE
Status: COMPLETED | OUTPATIENT
Start: 2018-02-23 | End: 2018-02-23

## 2018-02-23 RX ORDER — ACETAMINOPHEN 325 MG/1
650 TABLET ORAL EVERY 4 HOURS PRN
Status: DISCONTINUED | OUTPATIENT
Start: 2018-02-23 | End: 2018-02-25 | Stop reason: HOSPADM

## 2018-02-23 RX ORDER — NITROGLYCERIN 0.4 MG/1
0.4 TABLET SUBLINGUAL EVERY 5 MIN PRN
Status: DISCONTINUED | OUTPATIENT
Start: 2018-02-23 | End: 2018-02-25 | Stop reason: HOSPADM

## 2018-02-23 RX ORDER — ASPIRIN 81 MG/1
81 TABLET, CHEWABLE ORAL DAILY
Status: DISCONTINUED | OUTPATIENT
Start: 2018-02-24 | End: 2018-02-25 | Stop reason: HOSPADM

## 2018-02-23 RX ORDER — ONDANSETRON 2 MG/ML
4 INJECTION INTRAMUSCULAR; INTRAVENOUS EVERY 6 HOURS PRN
Status: DISCONTINUED | OUTPATIENT
Start: 2018-02-23 | End: 2018-02-25 | Stop reason: HOSPADM

## 2018-02-23 RX ADMIN — SODIUM CHLORIDE 1000 ML: 9 INJECTION, SOLUTION INTRAVENOUS at 12:40

## 2018-02-23 RX ADMIN — ASPIRIN 81 MG 324 MG: 81 TABLET ORAL at 14:32

## 2018-02-23 RX ADMIN — ARIPIPRAZOLE 10 MG: 5 TABLET ORAL at 20:16

## 2018-02-23 RX ADMIN — SODIUM CHLORIDE 1000 ML: 9 INJECTION, SOLUTION INTRAVENOUS at 14:16

## 2018-02-23 RX ADMIN — ALBUTEROL SULFATE 2.5 MG: 2.5 SOLUTION RESPIRATORY (INHALATION) at 20:38

## 2018-02-23 RX ADMIN — TRAZODONE HYDROCHLORIDE 50 MG: 50 TABLET ORAL at 20:16

## 2018-02-23 RX ADMIN — INSULIN GLARGINE 30 UNITS: 100 INJECTION, SOLUTION SUBCUTANEOUS at 23:50

## 2018-02-23 RX ADMIN — ATORVASTATIN CALCIUM 20 MG: 20 TABLET, FILM COATED ORAL at 20:16

## 2018-02-23 RX ADMIN — Medication 10 ML: at 20:15

## 2018-02-23 RX ADMIN — ENOXAPARIN SODIUM 40 MG: 40 INJECTION SUBCUTANEOUS at 18:19

## 2018-02-23 RX ADMIN — SODIUM CHLORIDE: 4.5 INJECTION, SOLUTION INTRAVENOUS at 20:15

## 2018-02-23 ASSESSMENT — ENCOUNTER SYMPTOMS
ABDOMINAL PAIN: 0
EYE DISCHARGE: 0
VOMITING: 0
SORE THROAT: 0
CHANGE IN BOWEL HABIT: 0
COLOR CHANGE: 0
DIARRHEA: 0
CHEST TIGHTNESS: 0
SHORTNESS OF BREATH: 0
BLOOD IN STOOL: 0
SHORTNESS OF BREATH: 0
COUGH: 0
WHEEZING: 0
ABDOMINAL DISTENTION: 0
SORE THROAT: 0
ABDOMINAL PAIN: 0
NAUSEA: 0
CONSTIPATION: 0
VOMITING: 0
RHINORRHEA: 0
TROUBLE SWALLOWING: 0

## 2018-02-23 ASSESSMENT — PAIN SCALES - GENERAL: PAINLEVEL_OUTOF10: 0

## 2018-02-23 NOTE — PROGRESS NOTES
Margarita Arechiga is a 59 y.o. female with numerous medical issues which include psychosis, coronary artery disease, obesity, hypertension, cerebrovascular small vessel disease, who presents with     Chief Complaint   Patient presents with    Follow-Up from Hospital     The patient had percutaneous coronary intervention with stent placement to LAD one week ago, still had chest pains even after the procedure, responds to nitroglycerin, the diabetes has been well-controlled    Fatigue     The patient came to the office today accompanied by her daughter. She appears weak and pale. The following laboratory reports since the past visit were reviewed (the ones pertinent to today's visit were discussed with the patient):    Admission on 02/23/2018   Component Date Value Ref Range Status    Ventricular Rate 02/23/2018 66  BPM Preliminary    Atrial Rate 02/23/2018 66  BPM Preliminary    P-R Interval 02/23/2018 212  ms Preliminary    QRS Duration 02/23/2018 116  ms Preliminary    Q-T Interval 02/23/2018 424  ms Preliminary    QTc Calculation (Bazett) 02/23/2018 444  ms Preliminary    P Axis 02/23/2018 19  degrees Preliminary    R Axis 02/23/2018 -56  degrees Preliminary    T Axis 02/23/2018 63  degrees Preliminary    Sodium 02/23/2018 137  132 - 144 mEq/L Final    Potassium 02/23/2018 5.0  3.5 - 5.1 mEq/L Final    Chloride 02/23/2018 101  98 - 107 mEq/L Final    CO2 02/23/2018 21* 22 - 29 mEq/L Final    Anion Gap 02/23/2018 15* 7 - 13 mEq/L Final    Glucose 02/23/2018 173* 74 - 109 mg/dL Final    BUN 02/23/2018 48* 8 - 23 mg/dL Final    CREATININE 02/23/2018 2.05* 0.50 - 0.90 mg/dL Final    GFR Non- 02/23/2018 24.7* >60 Final    Comment: >60 mL/min/1.73m2 EGFR, calc. for ages 25 and older using the  MDRD formula (not corrected for weight), is valid for stable  renal function.       GFR  02/23/2018 29.9* >60 Final    Comment: >60 mL/min/1.73m2 EGFR, calc. for ages 25 and 02/16/2018, Discharged on 02/17/2018   Component Date Value Ref Range Status    WBC 02/16/2018 10.3  4.8 - 10.8 K/uL Final    RBC 02/16/2018 3.63* 4.20 - 5.40 M/uL Final    Hemoglobin 02/16/2018 10.7* 12.0 - 16.0 g/dL Final    Hematocrit 02/16/2018 30.7* 37.0 - 47.0 % Final    MCV 02/16/2018 84.5  82.0 - 100.0 fL Final    MCH 02/16/2018 29.4  27.0 - 31.3 pg Final    MCHC 02/16/2018 34.7  33.0 - 37.0 % Final    RDW 02/16/2018 14.5  11.5 - 14.5 % Final    Platelets 76/78/9755 199  130 - 400 K/uL Final    Sodium 02/16/2018 140  132 - 144 mEq/L Final    Potassium reflex Magnesium 02/16/2018 5.0  3.5 - 5.1 mEq/L Final    Chloride 02/16/2018 104  98 - 107 mEq/L Final    CO2 02/16/2018 22  22 - 29 mEq/L Final    Anion Gap 02/16/2018 14* 7 - 13 mEq/L Final    Glucose 02/16/2018 196* 74 - 109 mg/dL Final    BUN 02/16/2018 32* 8 - 23 mg/dL Final    CREATININE 02/16/2018 1.44* 0.50 - 0.90 mg/dL Final    GFR Non- 02/16/2018 37.1* >60 Final    Comment: >60 mL/min/1.73m2 EGFR, calc. for ages 25 and older using the  MDRD formula (not corrected for weight), is valid for stable  renal function.  GFR  02/16/2018 44.9* >60 Final    Comment: >60 mL/min/1.73m2 EGFR, calc. for ages 25 and older using the  MDRD formula (not corrected for weight), is valid for stable  renal function.       Calcium 02/16/2018 10.0  8.6 - 10.2 mg/dL Final    Hemoglobin A1C 02/16/2018 5.7  4.8 - 5.9 % Final    POC Glucose 02/16/2018 296* 60 - 115 mg/dl Final    Performed on 02/16/2018 ACCU-CHEK   Final    ACT-K 02/16/2018 224* 82 - 152 sec Final    Sample Type 02/16/2018 ART   Final    Performed on 02/16/2018 SEE BELOW   Final    ACT-K 02/16/2018 202* 82 - 152 sec Final    Sample Type 02/16/2018 ART   Final    Performed on 02/16/2018 SEE BELOW   Final    POC Glucose 02/16/2018 197* 60 - 115 mg/dl Final    Performed on 02/16/2018 ACCU-CHEK   Final    POC Glucose 02/17/2018 213* 60 - 115 mg/dl one ovary intact, Dr Essie Pizarro, menorrhagia    TOE AMPUTATION Right     TOTAL KNEE ARTHROPLASTY  05/19/16    Dr Kelly Holliday Marital status:      Spouse name: N/A    Number of children: 2    Years of education: N/A     Occupational History    disabled      Social History Main Topics    Smoking status: Passive Smoke Exposure - Never Smoker    Smokeless tobacco: Never Used    Alcohol use No    Drug use: No    Sexual activity: Not Currently     Other Topics Concern    Not on file     Social History Narrative    Born in [place], one of 5    Keeps in touch with twin sister Lucia Thomason to [facility], , 2 children    Worked at [employer] due to mental illness    Lived at [employer], was discharged, returned to independent living in 2017 and has adjusted well    One son and one daughter, keep in touch       Family History   Problem Relation Age of Onset    Cancer Mother 76     survived   Harlan Carbon Hypertension Father     Diabetes Sister     Mental Illness Sister        Family and social history were reviewed and pertinent changes documented. ALLERGIES    Codeine    No current facility-administered medications on file prior to visit.       Current Outpatient Prescriptions on File Prior to Visit   Medication Sig Dispense Refill    isosorbide mononitrate (IMDUR) 30 MG extended release tablet Take 1 tablet by mouth daily 30 tablet 3    MAPAP 500 MG tablet TAKE 1 TABLET BY MOUTH EVERY 6 HOURS AS NEEDED FOR PAIN OR FEVER 120 tablet 1    NOVOFINE AUTOCOVER 30G X 8 MM MISC USE AS DIRECTED THREE TIMES A  each 1    NOVOLOG FLEXPEN 100 UNIT/ML injection pen INJECT 20 UNITS SUBCUTANEOUSLY THREE TIMES A DAY BEFORE MEALS 15 mL 2    nitroGLYCERIN (NITROSTAT) 0.4 MG SL tablet DISSOLVE 1 TAB UNDER THE TONGUE AS NEEDED FOR CHEST PAIN EVERY 5 MINUTES UP TO 3 TIMES IF NO RELIEF CALL 911 75 tablet 2    carvedilol (COREG) 12.5 MG delusional. Cognition and memory are normal. She does not express inappropriate judgment. She does not exhibit a depressed mood. She exhibits normal recent memory and normal remote memory. She is attentive. Assessment:    Laura Fair was seen today for follow-up from hospital and [facility]. Diagnoses and all orders for this visit:    Hypotension, unspecified hypotension type              Patient sent to the emergency room in wheelchair, with medical assistant, to rule out cardiac issues, dehydration, PE, others. Emergency room notified of patient's diagnosis and condition. S/P PTCA (percutaneous transluminal coronary angioplasty)    Other orders              Patient's insulin dosages reduced due to reported hypoglycemia  -     insulin glargine (LANTUS SOLOSTAR) 100 UNIT/ML injection pen; Inject 30 Units into the skin 2 times daily    Quality & Risk Score Accuracy - MEDICARE ADVANTAGE    Visit Dx:  Chronic kidney disease (CKD), stage IV (severe) ([facility])  Stable based upon last GFR. Continue current treatment plan, including avoidance of nephrotoxins, and follow up at least yearly. Additional documentation:  patient asymptomatic, followed also by nephrologist  Last edited 02/23/18 16:37 EST by Stephanie De Jesus MD             Plan:    Reviewed with the patient (/and caregiver if present): current health status, medications, activities and diet. See also orders and comments in the assessment section. Today's diagnosis (in the context of chronic problems) was discussed with patient (/and caregiver if present), questions answered. Patient sent to the emergency room in a wheelchair, with medical assistant, emergency room notified of patient's diagnosis and condition condition.     Orders Placed This Encounter   Medications    insulin glargine (LANTUS SOLOSTAR) 100 UNIT/ML injection pen     Sig: Inject 30 Units into the skin 2 times daily     Dispense:  15 mL     Refill:  2     Further workup and plan will be determined based on clinical progression and follow up test/ treatment results. Close follow up needed to evaluate treatment results and for care coordination. Return in about 1 week (around 3/2/2018). I have reviewed the patient's medical and surgical, family and social history, health maintenance schedule, and updated the computerized patient record. Please note this report has been partially produced by using speech recognition hardware. It may contain errors related to the system, including grammar, punctuation and spelling as well as words and phrases that may seem inaccurate. For any questions or concerns, please feel free to contact me for clarification.         Electronically signed by Papito Brown MD

## 2018-02-23 NOTE — ED NOTES
Pt reports that she was at Dr. Elizabeth Marcano office today for a follow up on her 2 stents placed last week. Pt reports feeling tired even after a full night of sleep. Alert and oriented x 3. Resps even and non labored. Skin pale cool and dry. +2 radials. Mucous membranes pale and dry. HIGH x 4.       Wang Ku RN  02/23/18 0907

## 2018-02-23 NOTE — CARE COORDINATION
Completed (Comment: Patient recently completed Diabetes Management Program)  Fall Risk Prevention: In Process  Home Care Waiver:  Completed  Home Health Services: In Process (Comment: Resume Home Health add PT.)  Meals on Wheels:  Completed  Physical Therapy: In Process (Comment: Home Health PT )  Zone Management Tools: In Process (Comment: Diabetes Zones)  Other Services or Interventions:  Assist in obtaining medical supplies/equipment to assist in ADLs. Goals Addressed             Most Recent     Conditions and Symptoms   On track (2/23/2018)             I will notify my provider of any barriers to my plan of care. I will follow my Zone Management tool to seek urgent or emergent care. I will notify my provider of any symptoms that indicate a worsening of my condition. Barriers: overwhelmed by complexity of regimen  Plan for overcoming my barriers: N/A  Confidence: 7/10  Anticipated Goal Completion Date: 8/31/2017         Nutrition Plan   On track (2/23/2018)             I will follow a nutritional plan as directed   Calorie Controlled:   1800 Calories    Barriers: none  Plan for overcoming my barriers: N/A  Confidence: 6/10  Anticipated Goal Completion Date: N/A, ongoing       Reduce Falls    On track (2/23/2018)             I will reduce my risk of falls by the following: Use walking aids like cane or walker  Follow through on orders for PT    Barriers: lack of education  Plan for overcoming my barriers: N/A  Confidence: 8/10  Anticipated Goal Completion Date: 12/31/2017            Prior to Admission medications    Medication Sig Start Date End Date Taking?  Authorizing Provider   insulin glargine (LANTUS SOLOSTAR) 100 UNIT/ML injection pen Inject 30 Units into the skin 2 times daily 2/23/18   Colette Haley MD   isosorbide mononitrate (IMDUR) 30 MG extended release tablet Take 1 tablet by mouth daily 1/30/18   Henry Vitale MD   MAPAP 500 MG tablet TAKE 1 TABLET BY MOUTH EVERY 6

## 2018-02-23 NOTE — PROGRESS NOTES
[facility] EMERGENCY [facility] AT [place] Respiratory Therapy Evaluation   Current Order:  ALBUTEROL Q6 PRN      Home Regimen: NONE      Ordering Physician: Isidro Sunshine  Re-evaluation Date:  N/A     Diagnosis: HYPOTENSION      Patient Status: Stable / Unstable + Physician notified    The following MDI Criteria must be met in order to convert aerosol to MDI with spacer. If unable to meet, MDI will be converted to aerosol:  []  Patient able to demonstrate the ability to use MDI effectively  []  Patient alert and cooperative  []  Patient able to take deep breath with 5-10 second hold  []  Medication(s) available in this delivery method   []  Peak flow greater than or equal to 200 ml/min            Current Order Substituted To  (same drug, same frequency)   Aerosol to MDI [] Albuterol Sulfate 0.083% unit dose by aerosol Albuterol Sulfate MDI 2 puffs by inhalation with spacer    [] Levalbuterol 1.25 mg unit dose by aerosol Levalbuterol MDI 2 puffs by inhalation with spacer    [] Levalbuterol 0.63 mg unit dose by aerosol Levalbuterol MDI 2 puffs by inhalation with spacer    [] Ipratropium Bromide 0.02% unit dose by aerosol Ipratropium Bromide MDI 2 puffs by inhalation with spacer    [] Duoneb (Ipratropium + Albuterol) unit dose by aerosol Ipratropium MDI + Albuterol MDI 2 puffs by inhalation w/spacer   MDI to Aerosol [] Albuterol Sulfate MDI Albuterol Sulfate 0.083% unit dose by aerosol    [] Levalbuterol MDI 2 puffs by inhalation Levalbuterol 1.25 mg unit dose by aerosol    [] Ipratropium Bromide MDI by inhalation Ipratropium Bromide 0.02% unit dose by aerosol    [] Combivent (Ipratropium + Albuterol) MDI by inhalation Duoneb (Ipratropium + Albuterol) unit dose by aerosol   Treatment Assessment [Frequency/Schedule]:  Change frequency to: _______ALBUTEROL Q4 PRN___________________________________________per Protocol, P&T, MEC      Points 0 1 2 3 4   Pulmonary Status  Non-Smoker  [x]   Smoking history   < 20 pack years  []   Smoking history  ?  20 pack

## 2018-02-23 NOTE — ED PROVIDER NOTES
Musculoskeletal: Normal range of motion. She exhibits no edema or deformity. Neurological: She is alert and oriented to person, place, and time. Coordination normal.   Skin: Skin is warm. There is pallor. Psychiatric: She has a normal mood and affect. DIAGNOSTIC RESULTS     EKG: All EKG's are interpreted by the Emergency Department Physician who either signs or Co-signs this chart in the absence of a cardiologist.    EKG shows sinus rhythm with a first-degree AV block at a rate of 66 bpm there is left ventricular hypertrophy and T-wave inversions in aVL QT is 424 ms. EKG of October 19,017 is similar to today's EKG with the exception of there is no longer the presence of a right bundle branch block. RADIOLOGY:   Non-plain film images such as CT, Ultrasound and MRI are read by the radiologist. Plain radiographic images are visualized and preliminarily interpreted by the emergency physician with the below findings:        Interpretation per the Radiologist below, if available at the time of this note:    No orders to display         ED BEDSIDE ULTRASOUND:   Performed by ED Physician - none    LABS:  Labs Reviewed   COMPREHENSIVE METABOLIC PANEL - Abnormal; Notable for the following:        Result Value    CO2 21 (*)     Anion Gap 15 (*)     Glucose 173 (*)     BUN 48 (*)     CREATININE 2.05 (*)     GFR Non- 24.7 (*)     GFR  29.9 (*)     All other components within normal limits   CBC - Abnormal; Notable for the following:     RBC 3.65 (*)     Hemoglobin 10.6 (*)     Hematocrit 30.9 (*)     All other components within normal limits   URINE RT REFLEX TO CULTURE - Abnormal; Notable for the following:     Clarity, UA CLOUDY (*)     Leukocyte Esterase, Urine LARGE (*)     All other components within normal limits   TROPONIN - Abnormal; Notable for the following:     Troponin 0.015 (*)     All other components within normal limits    Narrative:     CALL  [place]  [facility] tel. recognition program.  Efforts were made to edit the dictations but occasionally words are mis-transcribed.)    Quin Retana PA-C (electronically signed)  Attending Emergency Physician         Quin Retana PA-C  02/23/18 [address] [facility] Conrado Keller PA-C  02/23/18 [id number]

## 2018-02-24 PROBLEM — N17.9 ACUTE KIDNEY INJURY (HCC): Status: ACTIVE | Noted: 2018-02-24

## 2018-02-24 LAB
ANION GAP SERPL CALCULATED.3IONS-SCNC: 14 MEQ/L (ref 7–13)
BUN BLDV-MCNC: 34 MG/DL (ref 8–23)
CALCIUM SERPL-MCNC: 9.6 MG/DL (ref 8.6–10.2)
CHLORIDE BLD-SCNC: 108 MEQ/L (ref 98–107)
CHOLESTEROL, TOTAL: 118 MG/DL (ref 0–199)
CO2: 20 MEQ/L (ref 22–29)
CREAT SERPL-MCNC: 1.43 MG/DL (ref 0.5–0.9)
GFR AFRICAN AMERICAN: 45.3
GFR NON-AFRICAN AMERICAN: 37.4
GLUCOSE BLD-MCNC: 122 MG/DL (ref 60–115)
GLUCOSE BLD-MCNC: 132 MG/DL (ref 60–115)
GLUCOSE BLD-MCNC: 144 MG/DL (ref 60–115)
GLUCOSE BLD-MCNC: 146 MG/DL (ref 74–109)
GLUCOSE BLD-MCNC: 316 MG/DL (ref 60–115)
HBA1C MFR BLD: 5.6 % (ref 4.8–5.9)
HCT VFR BLD CALC: 29 % (ref 37–47)
HDLC SERPL-MCNC: 34 MG/DL (ref 40–59)
HEMOGLOBIN: 9.9 G/DL (ref 12–16)
INR BLD: 1.1
LDL CHOLESTEROL CALCULATED: 56 MG/DL (ref 0–129)
MAGNESIUM: 1.9 MG/DL (ref 1.7–2.3)
MCH RBC QN AUTO: 28.8 PG (ref 27–31.3)
MCHC RBC AUTO-ENTMCNC: 34.2 % (ref 33–37)
MCV RBC AUTO: 84.1 FL (ref 82–100)
PDW BLD-RTO: 14.6 % (ref 11.5–14.5)
PERFORMED ON: ABNORMAL
PLATELET # BLD: 162 K/UL (ref 130–400)
POTASSIUM SERPL-SCNC: 4.9 MEQ/L (ref 3.5–5.1)
PROTHROMBIN TIME: 11 SEC (ref 8.1–13.7)
RBC # BLD: 3.45 M/UL (ref 4.2–5.4)
SODIUM BLD-SCNC: 142 MEQ/L (ref 132–144)
TRIGL SERPL-MCNC: 141 MG/DL (ref 0–200)
WBC # BLD: 8.4 K/UL (ref 4.8–10.8)

## 2018-02-24 PROCEDURE — 96372 THER/PROPH/DIAG INJ SC/IM: CPT

## 2018-02-24 PROCEDURE — 36415 COLL VENOUS BLD VENIPUNCTURE: CPT

## 2018-02-24 PROCEDURE — 6370000000 HC RX 637 (ALT 250 FOR IP): Performed by: INTERNAL MEDICINE

## 2018-02-24 PROCEDURE — 93005 ELECTROCARDIOGRAM TRACING: CPT

## 2018-02-24 PROCEDURE — 6360000002 HC RX W HCPCS: Performed by: INTERNAL MEDICINE

## 2018-02-24 PROCEDURE — 80061 LIPID PANEL: CPT

## 2018-02-24 PROCEDURE — 85027 COMPLETE CBC AUTOMATED: CPT

## 2018-02-24 PROCEDURE — 83735 ASSAY OF MAGNESIUM: CPT

## 2018-02-24 PROCEDURE — 80048 BASIC METABOLIC PNL TOTAL CA: CPT

## 2018-02-24 PROCEDURE — 2580000003 HC RX 258: Performed by: INTERNAL MEDICINE

## 2018-02-24 PROCEDURE — 83036 HEMOGLOBIN GLYCOSYLATED A1C: CPT

## 2018-02-24 PROCEDURE — 85610 PROTHROMBIN TIME: CPT

## 2018-02-24 PROCEDURE — 2060000000 HC ICU INTERMEDIATE R&B

## 2018-02-24 RX ORDER — INSULIN GLARGINE 100 [IU]/ML
30 INJECTION, SOLUTION SUBCUTANEOUS 2 TIMES DAILY
Status: DISCONTINUED | OUTPATIENT
Start: 2018-02-24 | End: 2018-02-25 | Stop reason: HOSPADM

## 2018-02-24 RX ORDER — NICOTINE POLACRILEX 4 MG
15 LOZENGE BUCCAL PRN
Status: DISCONTINUED | OUTPATIENT
Start: 2018-02-24 | End: 2018-02-25 | Stop reason: HOSPADM

## 2018-02-24 RX ORDER — DEXTROSE MONOHYDRATE 50 MG/ML
100 INJECTION, SOLUTION INTRAVENOUS PRN
Status: DISCONTINUED | OUTPATIENT
Start: 2018-02-24 | End: 2018-02-25 | Stop reason: HOSPADM

## 2018-02-24 RX ORDER — DEXTROSE MONOHYDRATE 25 G/50ML
12.5 INJECTION, SOLUTION INTRAVENOUS PRN
Status: DISCONTINUED | OUTPATIENT
Start: 2018-02-24 | End: 2018-02-25 | Stop reason: HOSPADM

## 2018-02-24 RX ORDER — CIPROFLOXACIN 500 MG/1
250 TABLET, FILM COATED ORAL EVERY 12 HOURS SCHEDULED
Status: DISCONTINUED | OUTPATIENT
Start: 2018-02-24 | End: 2018-02-25 | Stop reason: HOSPADM

## 2018-02-24 RX ADMIN — TRAZODONE HYDROCHLORIDE 50 MG: 50 TABLET ORAL at 22:09

## 2018-02-24 RX ADMIN — ENOXAPARIN SODIUM 40 MG: 40 INJECTION SUBCUTANEOUS at 08:12

## 2018-02-24 RX ADMIN — ARIPIPRAZOLE 10 MG: 5 TABLET ORAL at 08:12

## 2018-02-24 RX ADMIN — Medication 10 ML: at 22:10

## 2018-02-24 RX ADMIN — ASPIRIN 81 MG CHEWABLE TABLET 81 MG: 81 TABLET CHEWABLE at 08:12

## 2018-02-24 RX ADMIN — CIPROFLOXACIN HYDROCHLORIDE 250 MG: 500 TABLET, FILM COATED ORAL at 11:03

## 2018-02-24 RX ADMIN — INSULIN GLARGINE 30 UNITS: 100 INJECTION, SOLUTION SUBCUTANEOUS at 22:10

## 2018-02-24 RX ADMIN — CIPROFLOXACIN HYDROCHLORIDE 250 MG: 500 TABLET, FILM COATED ORAL at 22:09

## 2018-02-24 RX ADMIN — ATORVASTATIN CALCIUM 20 MG: 20 TABLET, FILM COATED ORAL at 22:09

## 2018-02-24 RX ADMIN — Medication 10 ML: at 08:12

## 2018-02-24 ASSESSMENT — PAIN SCALES - GENERAL
PAINLEVEL_OUTOF10: 0

## 2018-02-24 NOTE — H&P
[address]                       [address], [address] [facility]                               HISTORY AND PHYSICAL    PATIENT NAME: Eliza Manuel                  :        1960  MED REC NO:   [id number]                            ROOM:       [id number]  ACCOUNT NO:   [de-identified]                           ADMIT DATE: 2018  PROVIDER:     Shyam Spaulding DO    HISTORY:  This is a 40-year-old female admitted to the hospital with  weakness from Dr. Sofía Amaya office. The patient is slightly hypotensive. The patient was noted to have a GFR of 25 mL a minute with a creatinine of  2.05. On review of previous labs on 10/12/2017, the patient had a BUN of 38,  creatinine of 1.2 and a GFR 45 mL per minute. The patient does have a  history of coronary artery disease and seizures with left hemiparesis  noted. She has a history of hepatitis C and diabetes with proteinuria. She has had stent placements in the past and is on Plavix therapy. The  patient appears to be in no distress at this time. Admitted with a blood  pressure of 110/60. She is in no distress, denying any chest pain or  shortness of breath. ALLERGIES:  CODEINE. HABITS:  Never smoked. No alcohol. No opioids or nonsteroidals. PAST SURGICAL HISTORY:  Hysterectomy, right toe amputation, coronary  angioplasty with stent,  section, and total knee arthroplasty. MEDICATIONS AT THE TIME OF ADMISSION:  _____, Ditropan, Trulicity, Abilify,  Lipitor, Desyrel, Neurontin, Zoloft, Prilosec, Plavix, Coreg, Imdur, and  Lantus. PHYSICAL EXAMINATION:  VITAL SIGNS:  Weight 222 pounds. Blood pressure improved since initial  admission of 150/60. Heart rate is 80 and regular. Respirations are 18 a  minute. Afebrile. HEENT:  Normocephalic. Pupils are equal and react to light. Sclerae are  clear. Throat shows no exudate. Tongue is midline. NECK:  Supple.   No JVD, bruits, or adenopathy. LUNGS:  Relatively clear. No wheezing, rales, or rhonchi. HEART:  Regular with a 1/6 systolic murmur. ABDOMEN:  Obese and soft. No guarding or rigidity. Bowel sounds are  present. No distention or ascites. EXTREMITIES:  Show the patient to have left-sided weakness to a mild degree  versus the right. The patient does have movement in all limbs. The lower  limbs show no edema. The skin is warm and dry. No cyanosis. Navarro Peeks' sign  is negative. IMPRESSION:  1. LUCÍA, secondary to possible hypotension. Etiology, uncertain if her  underlying infection. 2.  History of underlying _____ bipolar disorder. 3.  Diabetes mellitus type 2.  4.  Organic heart disease. 5.  Neuropathy. 6.  Proteinuria. 7.  CKD III. PLAN:  IV fluids. Obtain lab in the morning. Physical therapy to  ambulate. Watch blood pressure and One-Touch levels closely. Discharge  once the patient is stable and renal functions improve.         Edi Albert DO    D: 02/23/2018 21:48:59       T: 02/23/2018 21:53:12     [email or web address]  Job#: [id number]     Doc#: [id number]    CC:

## 2018-02-25 VITALS
WEIGHT: 221.56 LBS | DIASTOLIC BLOOD PRESSURE: 64 MMHG | HEIGHT: 66 IN | OXYGEN SATURATION: 98 % | SYSTOLIC BLOOD PRESSURE: 136 MMHG | BODY MASS INDEX: 35.61 KG/M2 | RESPIRATION RATE: 18 BRPM | HEART RATE: 62 BPM | TEMPERATURE: 97.5 F

## 2018-02-25 LAB
ANION GAP SERPL CALCULATED.3IONS-SCNC: 14 MEQ/L (ref 7–13)
BASOPHILS ABSOLUTE: 0 K/UL (ref 0–0.2)
BASOPHILS RELATIVE PERCENT: 0.3 %
BUN BLDV-MCNC: 27 MG/DL (ref 8–23)
CALCIUM SERPL-MCNC: 10.2 MG/DL (ref 8.6–10.2)
CHLORIDE BLD-SCNC: 104 MEQ/L (ref 98–107)
CO2: 21 MEQ/L (ref 22–29)
CREAT SERPL-MCNC: 1.44 MG/DL (ref 0.5–0.9)
EKG ATRIAL RATE: 66 BPM
EKG ATRIAL RATE: 68 BPM
EKG ATRIAL RATE: 68 BPM
EKG P AXIS: 19 DEGREES
EKG P AXIS: 41 DEGREES
EKG P AXIS: 49 DEGREES
EKG P-R INTERVAL: 210 MS
EKG P-R INTERVAL: 212 MS
EKG P-R INTERVAL: 224 MS
EKG Q-T INTERVAL: 424 MS
EKG Q-T INTERVAL: 442 MS
EKG Q-T INTERVAL: 446 MS
EKG QRS DURATION: 116 MS
EKG QRS DURATION: 122 MS
EKG QRS DURATION: 122 MS
EKG QTC CALCULATION (BAZETT): 444 MS
EKG QTC CALCULATION (BAZETT): 469 MS
EKG QTC CALCULATION (BAZETT): 474 MS
EKG R AXIS: -56 DEGREES
EKG R AXIS: -56 DEGREES
EKG R AXIS: -58 DEGREES
EKG T AXIS: 63 DEGREES
EKG T AXIS: 74 DEGREES
EKG T AXIS: 79 DEGREES
EKG VENTRICULAR RATE: 66 BPM
EKG VENTRICULAR RATE: 68 BPM
EKG VENTRICULAR RATE: 68 BPM
EOSINOPHILS ABSOLUTE: 0.4 K/UL (ref 0–0.7)
EOSINOPHILS RELATIVE PERCENT: 4.3 %
GFR AFRICAN AMERICAN: 44.9
GFR NON-AFRICAN AMERICAN: 37.1
GLUCOSE BLD-MCNC: 140 MG/DL (ref 60–115)
GLUCOSE BLD-MCNC: 147 MG/DL (ref 74–109)
GLUCOSE BLD-MCNC: 237 MG/DL (ref 60–115)
HCT VFR BLD CALC: 31.3 % (ref 37–47)
HEMOGLOBIN: 10.8 G/DL (ref 12–16)
LYMPHOCYTES ABSOLUTE: 2.9 K/UL (ref 1–4.8)
LYMPHOCYTES RELATIVE PERCENT: 30.2 %
MCH RBC QN AUTO: 28.9 PG (ref 27–31.3)
MCHC RBC AUTO-ENTMCNC: 34.6 % (ref 33–37)
MCV RBC AUTO: 83.4 FL (ref 82–100)
MONOCYTES ABSOLUTE: 0.6 K/UL (ref 0.2–0.8)
MONOCYTES RELATIVE PERCENT: 6.3 %
NEUTROPHILS ABSOLUTE: 5.7 K/UL (ref 1.4–6.5)
NEUTROPHILS RELATIVE PERCENT: 58.9 %
ORGANISM: ABNORMAL
PDW BLD-RTO: 13.8 % (ref 11.5–14.5)
PERFORMED ON: ABNORMAL
PERFORMED ON: ABNORMAL
PLATELET # BLD: 205 K/UL (ref 130–400)
POTASSIUM SERPL-SCNC: 4.9 MEQ/L (ref 3.5–5.1)
RBC # BLD: 3.75 M/UL (ref 4.2–5.4)
SODIUM BLD-SCNC: 139 MEQ/L (ref 132–144)
URINE CULTURE, ROUTINE: ABNORMAL
URINE CULTURE, ROUTINE: ABNORMAL
WBC # BLD: 9.7 K/UL (ref 4.8–10.8)

## 2018-02-25 PROCEDURE — 93005 ELECTROCARDIOGRAM TRACING: CPT

## 2018-02-25 PROCEDURE — 80048 BASIC METABOLIC PNL TOTAL CA: CPT

## 2018-02-25 PROCEDURE — 85025 COMPLETE CBC W/AUTO DIFF WBC: CPT

## 2018-02-25 PROCEDURE — 36415 COLL VENOUS BLD VENIPUNCTURE: CPT

## 2018-02-25 PROCEDURE — 6370000000 HC RX 637 (ALT 250 FOR IP): Performed by: INTERNAL MEDICINE

## 2018-02-25 RX ORDER — CIPROFLOXACIN 250 MG/1
250 TABLET, FILM COATED ORAL EVERY 12 HOURS SCHEDULED
Qty: 14 TABLET | Refills: 0 | Status: SHIPPED | OUTPATIENT
Start: 2018-02-25 | End: 2018-03-04

## 2018-02-25 RX ADMIN — ARIPIPRAZOLE 10 MG: 5 TABLET ORAL at 09:34

## 2018-02-25 RX ADMIN — CIPROFLOXACIN HYDROCHLORIDE 250 MG: 500 TABLET, FILM COATED ORAL at 09:34

## 2018-02-25 RX ADMIN — ASPIRIN 81 MG CHEWABLE TABLET 81 MG: 81 TABLET CHEWABLE at 09:35

## 2018-02-25 ASSESSMENT — PAIN SCALES - GENERAL
PAINLEVEL_OUTOF10: 0
PAINLEVEL_OUTOF10: 0

## 2018-02-25 NOTE — DISCHARGE SUMMARY
Physician Discharge Summary     Patient ID:  Dwight Dewey  [id number]  19 y.o.  1960    Admit date: 2/23/2018    Discharge date and time: 2/25/18    Admitting Physician: Cosme Johnson DO    Discharge Physician: Shae Tabor MD    Admission Diagnoses: Elevated troponin [R74.8]  Acute kidney injury [facility]) [N17.9]    Discharge Diagnoses: lucía, ckd stage 3, urinary tract infection    Admission Condition: fair    Discharged Condition: good    Indication for Admission: low bp and acute kidney injury    Hospital Course: patient was admitted for low bp and lucía. Urine sent and was positive for pyuria. Got IVF. cipro added. Urine culture positive for klebsiella sens to cipro. Sent out on 1 week of cipro with instructions to f/u with Dr. Diane Fisher and Dr. Tami Boykin. LUCÍA resolved with IVF. bp was ok on d/c.       Consults: none    Significant Diagnostic Studies: labs: cultures    Outstanding Order Results     Date and Time Order Name Status Description    2/25/2018 0131 EKG 12 lead Preliminary     2/24/2018 0503 EKG 12 lead Preliminary           Treatments: IV hydration and antibiotics: Cipro    Discharge Exam:  /64   Pulse 62   Temp 97.5 °F (36.4 °C) (Oral)   Resp 18   Ht 5' 6\" (1.676 m)   Wt 221 lb 9 oz (100.5 kg)   SpO2 98%   BMI 35.76 kg/m²   General appearance: alert, appears stated age and cooperative  Neck: no adenopathy, no carotid bruit, no JVD, supple, symmetrical, trachea midline and thyroid not enlarged, symmetric, no tenderness/mass/nodules  Lungs: clear to auscultation bilaterally  Heart: regular rate and rhythm, S1, S2 normal, no murmur, click, rub or gallop  Abdomen: soft, non-tender; bowel sounds normal; no masses,  no organomegaly  Extremities: extremities normal, atraumatic, no cyanosis or edema  Skin: Skin color, texture, turgor normal. No rashes or lesions    Disposition: home    Patient Instructions:   Meds:    Details   isosorbide mononitrate (IMDUR) 30 MG extended release tablet Take 1 tablet by mouth daily  Qty: 30 tablet, Refills: 3       LANTUS SOLOSTAR 100 UNIT/ML injection pen INJECT 50 UNITS SUBCUTANEOUSLY TWICE DAILY  Qty: 15 mL, Refills: 10       MAPAP 500 MG tablet TAKE 1 TABLET BY MOUTH EVERY 6 HOURS AS NEEDED FOR PAIN OR FEVER  Qty: 120 tablet, Refills: 1       NOVOFINE AUTOCOVER 30G X 8 MM MISC USE AS DIRECTED THREE TIMES A DAY  Qty: 100 each, Refills: 1       NOVOLOG FLEXPEN 100 UNIT/ML injection pen INJECT 20 UNITS SUBCUTANEOUSLY THREE TIMES A DAY BEFORE MEALS  Qty: 15 mL, Refills: 2       nitroGLYCERIN (NITROSTAT) 0.4 MG SL tablet DISSOLVE 1 TAB UNDER THE TONGUE AS NEEDED FOR CHEST PAIN EVERY 5 MINUTES UP TO 3 TIMES IF NO RELIEF CALL 911  Qty: 75 tablet, Refills: 2       carvedilol (COREG) 12.5 MG tablet TAKE (1) TABLET BY MOUTH TWICE DAILY  Qty: 180 tablet, Refills: 10     Associated Diagnoses: Coronary artery disease involving native heart without angina pectoris, unspecified vessel or lesion type; Mixed hyperlipidemia; Essential hypertension       Cholecalciferol (VITAMIN D3) 1000 units TABS TAKE (1) TABLET BY MOUTH DAILY  Qty: 90 tablet, Refills: 10       clopidogrel (PLAVIX) 75 MG tablet TAKE (1) TABLET BY MOUTH NIGHTLY  Qty: 90 tablet, Refills: 10       cyanocobalamin (CVS VITAMIN B12) 1000 MCG tablet Take 1 tablet by mouth daily  Qty: 30 tablet, Refills: 5       sertraline (ZOLOFT) 50 MG tablet Take 1 tablet by mouth daily  Qty: 30 tablet, Refills: 5       lactobacillus acidophilus (FLORANEX) Take 4 tablets by mouth 3 times daily  Qty: 20 tablet, Refills: 0       NYSTATIN 270411 UNIT/GM powder APPLY TO ABDOMINAL FOLDS EVERY 12 HOURS AS NEEDED  Qty: 15 g, Refills: 5       omeprazole (PRILOSEC) 20 MG delayed release capsule TAKE (1) CAPSULE BY MOUTH DAILY AS NEEDED FOR HEATBURN  Qty: 90 capsule, Refills: 1       docusate sodium (COLACE) 100 MG capsule TAKE (1) CAPSULE BY MOUTH TWICE DAILY  Qty: 180 capsule, Refills: 1       gabapentin (NEURONTIN) 300 MG capsule TAKE (1)

## 2018-02-26 ENCOUNTER — APPOINTMENT (OUTPATIENT)
Dept: CARDIAC REHAB | Age: 58
DRG: 683 | End: 2018-02-26
Payer: MEDICARE

## 2018-02-27 ENCOUNTER — CARE COORDINATION (OUTPATIENT)
Dept: CASE MANAGEMENT | Age: 58
End: 2018-02-27

## 2018-02-27 ENCOUNTER — TELEPHONE (OUTPATIENT)
Dept: INTERNAL MEDICINE CLINIC | Age: 58
End: 2018-02-27

## 2018-02-27 NOTE — CARE COORDINATION
[place]   3/28/2018 9:00 AM Ashlee Members,  [facility]   3/30/2018 9:00 AM Ashlee Members,  [facility]   4/2/2018 9:00 AM Ashlee Members,  [facility]   4/4/2018 9:00 AM Ashlee Members,  [facility]   4/6/2018 9:00 AM Ashlee Members,  [facility]   4/9/2018 9:00 AM Ashlee Members,  [facility]   4/10/2018 9:30 AM Haja Farfan MD [facility] [place]   4/10/2018 10:00 AM Luis Pro MD [facility]   4/11/2018 9:00 AM Ashlee Members,  [facility]   4/13/2018 9:00 AM Ashlee Members,  [facility]   4/16/2018 9:00 AM Ashlee Members,  [facility]   4/18/2018 9:00 AM Ashlee Members,  [facility]   4/20/2018 9:00 AM Ashlee Members,  [facility]   4/23/2018 9:00 AM Ashlee Members,  [facility]   4/25/2018 9:00 AM Ashlee Members,  [facility]   4/27/2018 9:00 AM Ashlee Members,  [facility]   4/30/2018 9:00 AM Ashlee Members,  [facility]   5/2/2018 9:00 AM Ashlee Members,  [facility]   5/4/2018 9:00 AM Ashlee Members,  [facility]   5/7/2018 9:00 AM Ashlee Members,  [facility]   5/9/2018 9:00 AM Ashlee Members,  [facility]   5/11/2018 9:00 AM Ashlee Members,  [facility]   5/14/2018 9:00 AM Ashlee Members,  [facility]   5/16/2018 9:00 AM Ashlee Members,  [facility]   5/18/2018 9:00 AM Ashlee Members,  [facility]   5/21/2018 9:00 AM Ashlee Members,  [facility]   5/23/2018 9:00 AM Ashlee Wright,  [facility]   5/25/2018 9:00 AM Ashlee Wright,  [facility]

## 2018-03-01 ENCOUNTER — CARE COORDINATION (OUTPATIENT)
Dept: CARE COORDINATION | Age: 58
End: 2018-03-01

## 2018-03-01 RX ORDER — BLOOD PRESSURE TEST KIT
1 KIT MISCELLANEOUS DAILY
Qty: 1 KIT | Refills: 0 | Status: SHIPPED | OUTPATIENT
Start: 2018-03-01 | End: 2019-12-27

## 2018-03-01 NOTE — CARE COORDINATION
injection pen INJECT 20 UNITS SUBCUTANEOUSLY THREE TIMES A DAY BEFORE MEALS 11/29/17   Nguyen Cope MD   nitroGLYCERIN (NITROSTAT) 0.4 MG SL tablet DISSOLVE 1 TAB UNDER THE TONGUE AS NEEDED FOR CHEST PAIN EVERY 5 MINUTES UP TO 3 TIMES IF NO RELIEF CALL 911 11/29/17   Nguyen Cope MD   carvedilol (COREG) 12.5 MG tablet TAKE (1) TABLET BY MOUTH TWICE DAILY 11/6/17   Nguyen Cope MD   Cholecalciferol (VITAMIN D3) 1000 units TABS TAKE (1) TABLET BY MOUTH DAILY 11/6/17   Nguyen Cope MD   clopidogrel (PLAVIX) 75 MG tablet TAKE (1) TABLET BY MOUTH NIGHTLY 11/6/17   Nguyen Cope MD   cyanocobalamin (CVS VITAMIN B12) 1000 MCG tablet Take 1 tablet by mouth daily 10/16/17   Nguyen Cope MD   sertraline (ZOLOFT) 50 MG tablet Take 1 tablet by mouth daily 10/16/17   Nguyen Cope MD   lactobacillus acidophilus [facility]) Take 4 tablets by mouth 3 times daily 10/13/17   Maday Hale MD   docusate sodium (COLACE) 100 MG capsule TAKE (1) CAPSULE BY MOUTH TWICE DAILY 8/31/17   Nguyen Cope MD   FREESTYLE LITE strip TEST every 6 hours 8/15/17   Nguyen Cope MD   ARIPiprazole (ABILIFY) 10 MG tablet Take 1 tablet by mouth daily 8/1/17   Nguyen Cope MD   Alcohol Swabs (EASY TOUCH ALCOHOL PREP MEDIUM) 70 % PADS USE AS DIRECTED THREE TIMES A DAY 7/3/17   Nguyen Cope MD   TRULICITY 1.01 UU/0.1EU SOPN INJECT 0.5ML SUBCUTANEOUSLY ONCE WEEKLY AS DIRECTED 7/3/17   Nguyen Cope MD   Blood Glucose Monitoring Suppl (FREESTYLE LITE) CORETTA use as directed 4/6/17   Historical Provider, MD   FREESTYLE LANCETS MISC TEST every 6 hours 4/6/17   Historical Provider, MD       Future Appointments  Date Time Provider Aminata Cortes   3/2/2018 9:00 AM Sybil Graves  [facility]   3/5/2018 9:00 AM Sybil Graves [address] [facility]   3/6/2018 10:45 AM Lavelle Key  [facility]   3/7/2018 9:00 AM Sybil Graves  [facility]   3/7/2018 10:15 AM Mayte Serna MD [address]   3/9/2018 9:00 AM [place] Sizer,  [facility]   3/12/2018 9:00 AM [place] Sizer,  [facility]   3/14/2018 9:00 AM Nicole Sizer,  [facility]   3/16/2018 9:00 AM [place] Sizer,  [facility]   3/19/2018 9:00 AM [place] Sizer,  [facility]   3/21/2018 9:00 AM Nicole Sizer,  [facility]   3/23/2018 9:00 AM Nicole Sizer,  [facility]   3/26/2018 9:00 AM [place] Sizer,  [facility]   3/28/2018 9:00 AM [place] Sizer,  [facility]   3/30/2018 9:00 AM Nicole Sizer,  [facility]   4/2/2018 9:00 AM Nicole Arredondor,  [facility]   4/4/2018 9:00 AM [place] Arredondor,  [facility]   4/6/2018 9:00 AM [place] Sizer,  [facility]   4/9/2018 9:00 AM [place] Sizer,  [facility]   4/10/2018 9:30 AM Mayte Serna MD [address]   4/10/2018 10:00 AM Emma Crews MD [facility]   4/11/2018 9:00 AM Nicole Sizer,  [facility]   4/13/2018 9:00 AM Nicole Sizer,  [facility]   4/16/2018 9:00 AM Nicole Sizer,  [facility]   4/18/2018 9:00 AM Nicole Sizer,  [facility]   4/20/2018 9:00 AM Nicole Sizer,  [facility]   4/23/2018 9:00 AM Nicole Sizer,  [facility]   4/25/2018 9:00 AM Nicole Sizer,  [facility]   4/27/2018 9:00 AM [place] Sizer,  [facility]   4/30/2018 9:00 AM [place] Sizer,  [facility]   5/2/2018 9:00 AM Nicole Sizer,  [facility]   5/4/2018 9:00 AM Nicole Lala  [facility]   5/7/2018 9:00 AM Nicole Lala  [facility]   5/9/2018 9:00 AM FLAQUITO Augustine [address] [facility]   5/11/2018 9:00 AM Sybil Graves  [facility]   5/14/2018 9:00 AM Sybil Graves  [facility]   5/16/2018 9:00 AM Sybil Graves  [facility]   5/18/2018 9:00 AM Sybil Graves  [facility]   5/21/2018 9:00 AM Sybil Graves  [facility]   5/23/2018 9:00 AM Sybil Graves  [facility]   5/25/2018 9:00 AM Sybil Graves  [facility]

## 2018-03-06 ENCOUNTER — OFFICE VISIT (OUTPATIENT)
Dept: CARDIOLOGY CLINIC | Age: 58
End: 2018-03-06
Payer: MEDICARE

## 2018-03-06 VITALS
SYSTOLIC BLOOD PRESSURE: 112 MMHG | WEIGHT: 224 LBS | HEART RATE: 83 BPM | BODY MASS INDEX: 36 KG/M2 | RESPIRATION RATE: 16 BRPM | OXYGEN SATURATION: 98 % | HEIGHT: 66 IN | DIASTOLIC BLOOD PRESSURE: 60 MMHG

## 2018-03-06 DIAGNOSIS — I25.119 CORONARY ARTERY DISEASE INVOLVING NATIVE HEART WITH ANGINA PECTORIS, UNSPECIFIED VESSEL OR LESION TYPE (HCC): Chronic | ICD-10-CM

## 2018-03-06 DIAGNOSIS — R06.09 DYSPNEA ON EXERTION: ICD-10-CM

## 2018-03-06 DIAGNOSIS — I25.10 CAD IN NATIVE ARTERY: ICD-10-CM

## 2018-03-06 DIAGNOSIS — I20.9 ANGINA, CLASS III (HCC): ICD-10-CM

## 2018-03-06 DIAGNOSIS — I10 ESSENTIAL HYPERTENSION: Chronic | ICD-10-CM

## 2018-03-06 DIAGNOSIS — E78.2 MIXED HYPERLIPIDEMIA: Chronic | ICD-10-CM

## 2018-03-06 DIAGNOSIS — Z95.5 STENTED CORONARY ARTERY: ICD-10-CM

## 2018-03-06 PROCEDURE — 3014F SCREEN MAMMO DOC REV: CPT | Performed by: INTERNAL MEDICINE

## 2018-03-06 PROCEDURE — 3017F COLORECTAL CA SCREEN DOC REV: CPT | Performed by: INTERNAL MEDICINE

## 2018-03-06 PROCEDURE — G8427 DOCREV CUR MEDS BY ELIG CLIN: HCPCS | Performed by: INTERNAL MEDICINE

## 2018-03-06 PROCEDURE — 1036F TOBACCO NON-USER: CPT | Performed by: INTERNAL MEDICINE

## 2018-03-06 PROCEDURE — G8484 FLU IMMUNIZE NO ADMIN: HCPCS | Performed by: INTERNAL MEDICINE

## 2018-03-06 PROCEDURE — G8598 ASA/ANTIPLAT THER USED: HCPCS | Performed by: INTERNAL MEDICINE

## 2018-03-06 PROCEDURE — 1111F DSCHRG MED/CURRENT MED MERGE: CPT | Performed by: INTERNAL MEDICINE

## 2018-03-06 PROCEDURE — G8417 CALC BMI ABV UP PARAM F/U: HCPCS | Performed by: INTERNAL MEDICINE

## 2018-03-06 PROCEDURE — 99212 OFFICE O/P EST SF 10 MIN: CPT | Performed by: INTERNAL MEDICINE

## 2018-03-06 ASSESSMENT — ENCOUNTER SYMPTOMS
ABDOMINAL PAIN: 0
CHOKING: 0
CHEST TIGHTNESS: 0
COLOR CHANGE: 0
BACK PAIN: 0
APNEA: 0
ABDOMINAL DISTENTION: 0
SHORTNESS OF BREATH: 1

## 2018-03-06 NOTE — PROGRESS NOTES
 omeprazole (PRILOSEC) 20 MG delayed release capsule TAKE 1 CAPSULE BY MOUTH DAILY AS NEEDED FOR HEARTBURN 90 capsule 1    gabapentin (NEURONTIN) 300 MG capsule TAKE ONE CAPSULE BY MOUTH TWICE DAILY 180 capsule 0    MAPAP 500 MG tablet TAKE 1 TABLET BY MOUTH EVERY 6 HOURS AS NEEDED FOR PAIN OR FEVER 120 tablet 1    insulin glargine (LANTUS SOLOSTAR) 100 UNIT/ML injection pen Inject 30 Units into the skin 2 times daily 15 mL 2    isosorbide mononitrate (IMDUR) 30 MG extended release tablet Take 1 tablet by mouth daily 30 tablet 3    NOVOLOG FLEXPEN 100 UNIT/ML injection pen INJECT 20 UNITS SUBCUTANEOUSLY THREE TIMES A DAY BEFORE MEALS 15 mL 2    nitroGLYCERIN (NITROSTAT) 0.4 MG SL tablet DISSOLVE 1 TAB UNDER THE TONGUE AS NEEDED FOR CHEST PAIN EVERY 5 MINUTES UP TO 3 TIMES IF NO RELIEF CALL 911 75 tablet 2    carvedilol (COREG) 12.5 MG tablet TAKE (1) TABLET BY MOUTH TWICE DAILY 180 tablet 10    Cholecalciferol (VITAMIN D3) 1000 units TABS TAKE (1) TABLET BY MOUTH DAILY 90 tablet 10    clopidogrel (PLAVIX) 75 MG tablet TAKE (1) TABLET BY MOUTH NIGHTLY 90 tablet 10    cyanocobalamin (CVS VITAMIN B12) 1000 MCG tablet Take 1 tablet by mouth daily 30 tablet 5    sertraline (ZOLOFT) 50 MG tablet Take 1 tablet by mouth daily 30 tablet 5    lactobacillus acidophilus (FLORANEX) Take 4 tablets by mouth 3 times daily 20 tablet 0    FREESTYLE LITE strip TEST every 6 hours 100 each 3    ARIPiprazole (ABILIFY) 10 MG tablet Take 1 tablet by mouth daily 30 tablet 3    Alcohol Swabs (EASY TOUCH ALCOHOL PREP MEDIUM) 70 % PADS USE AS DIRECTED THREE TIMES A  each 3    TRULICITY 3.42 /4.8MA SOPN INJECT 0.5ML SUBCUTANEOUSLY ONCE WEEKLY AS DIRECTED 2 mL 10    Blood Glucose Monitoring Suppl (FREESTYLE LITE) CORETTA use as directed  0    FREESTYLE LANCETS MISC TEST every 6 hours  0    docusate sodium (COLACE) 100 MG capsule TAKE (1) CAPSULE BY MOUTH TWICE DAILY 180 capsule 1     No current facility-administered are managed by  PCP. As per recent guidelines, moderate dose high intensity statin is indicated. 5. Angina, class III ([address]  resolved    6. Dyspnea on exertion  improved    7. Stented coronary artery-plan is to stay on Plavix indefinately per Dr Citlaly Briones:  Heart Healthy Lifestyle, Improve BMI and Walk Daily    Return in about 6 months (around 9/6/2018) for followup cv disease.       Electronically signed by Wesley Reyes MD on 3/6/2018 at 11:26 AM

## 2018-03-07 ENCOUNTER — OFFICE VISIT (OUTPATIENT)
Dept: INTERNAL MEDICINE CLINIC | Age: 58
End: 2018-03-07
Payer: MEDICARE

## 2018-03-07 VITALS
RESPIRATION RATE: 18 BRPM | DIASTOLIC BLOOD PRESSURE: 58 MMHG | OXYGEN SATURATION: 96 % | TEMPERATURE: 98.6 F | HEIGHT: 66 IN | SYSTOLIC BLOOD PRESSURE: 96 MMHG | HEART RATE: 81 BPM | WEIGHT: 221.9 LBS | BODY MASS INDEX: 35.66 KG/M2

## 2018-03-07 DIAGNOSIS — I10 ESSENTIAL HYPERTENSION: Chronic | ICD-10-CM

## 2018-03-07 DIAGNOSIS — E11.21 DIABETIC NEPHROPATHY WITH PROTEINURIA (HCC): Primary | ICD-10-CM

## 2018-03-07 DIAGNOSIS — N39.0 RECURRENT UTI (URINARY TRACT INFECTION): ICD-10-CM

## 2018-03-07 LAB — GLUCOSE BLD-MCNC: 173 MG/DL

## 2018-03-07 PROCEDURE — 99214 OFFICE O/P EST MOD 30 MIN: CPT | Performed by: INTERNAL MEDICINE

## 2018-03-07 PROCEDURE — G8427 DOCREV CUR MEDS BY ELIG CLIN: HCPCS | Performed by: INTERNAL MEDICINE

## 2018-03-07 PROCEDURE — 82962 GLUCOSE BLOOD TEST: CPT | Performed by: INTERNAL MEDICINE

## 2018-03-07 PROCEDURE — 3014F SCREEN MAMMO DOC REV: CPT | Performed by: INTERNAL MEDICINE

## 2018-03-07 PROCEDURE — 3044F HG A1C LEVEL LT 7.0%: CPT | Performed by: INTERNAL MEDICINE

## 2018-03-07 PROCEDURE — 1111F DSCHRG MED/CURRENT MED MERGE: CPT | Performed by: INTERNAL MEDICINE

## 2018-03-07 PROCEDURE — G8417 CALC BMI ABV UP PARAM F/U: HCPCS | Performed by: INTERNAL MEDICINE

## 2018-03-07 PROCEDURE — 1036F TOBACCO NON-USER: CPT | Performed by: INTERNAL MEDICINE

## 2018-03-07 PROCEDURE — G8484 FLU IMMUNIZE NO ADMIN: HCPCS | Performed by: INTERNAL MEDICINE

## 2018-03-07 PROCEDURE — 3017F COLORECTAL CA SCREEN DOC REV: CPT | Performed by: INTERNAL MEDICINE

## 2018-03-07 PROCEDURE — G8598 ASA/ANTIPLAT THER USED: HCPCS | Performed by: INTERNAL MEDICINE

## 2018-03-07 RX ORDER — CARVEDILOL 6.25 MG/1
6.25 TABLET ORAL 2 TIMES DAILY WITH MEALS
Qty: 180 TABLET | Refills: 0 | Status: SHIPPED | OUTPATIENT
Start: 2018-03-07 | End: 2018-05-22 | Stop reason: SDUPTHER

## 2018-03-07 RX ORDER — SULFAMETHOXAZOLE AND TRIMETHOPRIM 800; 160 MG/1; MG/1
1 TABLET ORAL NIGHTLY
Qty: 30 TABLET | Refills: 2 | Status: SHIPPED | OUTPATIENT
Start: 2018-03-07 | End: 2018-04-06

## 2018-03-07 ASSESSMENT — ENCOUNTER SYMPTOMS
CHOKING: 0
RESPIRATORY NEGATIVE: 1
ABDOMINAL PAIN: 0
CHEST TIGHTNESS: 0
BLOOD IN STOOL: 0
COUGH: 0
BLURRED VISION: 0
SHORTNESS OF BREATH: 0

## 2018-03-07 NOTE — PROGRESS NOTES
Pablo Díaz is a 59 y.o. female with coronary artery disease, cerebrovascular disease, schizophrenia, obesity, neuropathy, osteoarthritis with knee replacement status, who presents with     Chief Complaint   Patient presents with    Follow-Up from Hospital     Patient was seen at [facility] ER 2/23/18 for dehydration, admitted until 2/25/18. She was sent home with Cipro for Klebsiella UTI. Patient states she is feeling better    Hypertension     States she has been drinking plenty of water, takes all medications, no fatigue or similar complaints    Diabetes     Has been stable since hospital discharge, A1c was 5.6 just 3 weeks ago   The patient comes to the office visit today accompanied by her daughter. She worries about frequent bladder infections, monthly, especially when having to prepare for the second knee replacement.     The following laboratory reports since the past visit were reviewed (the ones pertinent to today's visit were discussed with the patient):    Office Visit on 03/07/2018   Component Date Value Ref Range Status    Glucose 03/07/2018 173  mg/dL Final   Admission on 02/23/2018, Discharged on 02/25/2018   Component Date Value Ref Range Status    Ventricular Rate 02/23/2018 66  BPM Final    Atrial Rate 02/23/2018 66  BPM Final    P-R Interval 02/23/2018 212  ms Final    QRS Duration 02/23/2018 116  ms Final    Q-T Interval 02/23/2018 424  ms Final    QTc Calculation (Bazett) 02/23/2018 444  ms Final    P Axis 02/23/2018 19  degrees Final    R Axis 02/23/2018 -56  degrees Final    T Axis 02/23/2018 63  degrees Final    Sodium 02/23/2018 137  132 - 144 mEq/L Final    Potassium 02/23/2018 5.0  3.5 - 5.1 mEq/L Final    Chloride 02/23/2018 101  98 - 107 mEq/L Final    CO2 02/23/2018 21* 22 - 29 mEq/L Final    Anion Gap 02/23/2018 15* 7 - 13 mEq/L Final    Glucose 02/23/2018 173* 74 - 109 mg/dL Final    BUN 02/23/2018 48* 8 - 23 mg/dL Final    CREATININE 02/23/2018 2.05* 0.50 - 0.90 mg/dL Final    GFR Non- 02/23/2018 24.7* >60 Final    Comment: >60 mL/min/1.73m2 EGFR, calc. for ages 25 and older using the  MDRD formula (not corrected for weight), is valid for stable  renal function.  GFR  02/23/2018 29.9* >60 Final    Comment: >60 mL/min/1.73m2 EGFR, calc. for ages 25 and older using the  MDRD formula (not corrected for weight), is valid for stable  renal function.       Calcium 02/23/2018 10.2  8.6 - 10.2 mg/dL Final    Total Protein 02/23/2018 6.9  6.4 - 8.1 g/dL Final    Alb 02/23/2018 4.0  3.9 - 4.9 g/dL Final    Total Bilirubin 02/23/2018 0.5  0.0 - 1.2 mg/dL Final    Alkaline Phosphatase 02/23/2018 95  40 - 130 U/L Final    ALT 02/23/2018 22  0 - 33 U/L Final    AST 02/23/2018 20  0 - 35 U/L Final    Globulin 02/23/2018 2.9  2.3 - 3.5 g/dL Final    WBC 02/23/2018 9.2  4.8 - 10.8 K/uL Final    RBC 02/23/2018 3.65* 4.20 - 5.40 M/uL Final    Hemoglobin 02/23/2018 10.6* 12.0 - 16.0 g/dL Final    Hematocrit 02/23/2018 30.9* 37.0 - 47.0 % Final    MCV 02/23/2018 84.6  82.0 - 100.0 fL Final    MCH 02/23/2018 29.0  27.0 - 31.3 pg Final    MCHC 02/23/2018 34.2  33.0 - 37.0 % Final    RDW 02/23/2018 14.4  11.5 - 14.5 % Final    Platelets 30/87/4255 202  130 - 400 K/uL Final    Color, UA 02/23/2018 Yellow  Straw/Yellow Final    Clarity, UA 02/23/2018 CLOUDY* Clear Final    Glucose, Ur 02/23/2018 Negative  Negative mg/dL Final    Bilirubin Urine 02/23/2018 Negative  Negative Final    Ketones, Urine 02/23/2018 Negative  Negative mg/dL Final    Specific Gravity, UA 02/23/2018 1.005  1.005 - 1.030 Final    Blood, Urine 02/23/2018 Negative  Negative Final    pH, UA 02/23/2018 5.5  5.0 - 9.0 Final    Protein, UA 02/23/2018 Negative  Negative mg/dL Final    Urobilinogen, Urine 02/23/2018 0.2  <2.0 E.U./dL Final    Nitrite, Urine 02/23/2018 Negative  Negative Final    Leukocyte Esterase, Urine 02/23/2018 LARGE* Negative Final    Urine Reflex to Culture 02/23/2018 YES   Final    Troponin 02/23/2018 0.015* 0.000 - 0.010 ng/mL Final    Total CK 02/23/2018 89  0 - 170 U/L Final    Organism 02/23/2018 Klebsiella pneumoniae ssp pneumoniae*  Final    Urine Culture, Routine 02/23/2018 >100,000 CFU/ml   Final    WBC, UA 02/23/2018 10-20* 0 - 5 /HPF Final    RBC, UA 02/23/2018 0-2  0 - 2 /HPF Final    Epi Cells 02/23/2018 3-5  /HPF Final    Bacteria, UA 02/23/2018 Few  /HPF Final    Troponin 02/23/2018 <0.010  0.000 - 0.010 ng/mL Final    Troponin 02/23/2018 <0.010  0.000 - 0.010 ng/mL Final    Pro-BNP 02/23/2018 560  pg/mL Final    Comment: NT-pro BNP ACUTE Interpretive Guidelines:        Age        Cutoff for Heart Failure     Less than 50 yrs   450 pg/mL     50-75 yrs           900 pg/mL     Greater than 75 yrs  1800 pg/mL    NT-pro BNP NON-ACUTE Interpretive Guidelines:      Age                 Reference Range    Less than 74 yrs   0-125 pg/mL    Greater than 74 yrs   0-450 pg/mL    Other possible causes of an elevated NT-proBNP include:  cardiac ischemia, acute coronary syndrome, COPD, pneumonia,  atrial fibrillation, pulmonary emboli, pulmonary hypertension,  pericarditis    Reference:  ANA Blount, et al. NT-proBNP testing for diagnosis and  short-term prognosis in acute destabilized HF: an international  pooled analysis of 1256 patients.  Heart Journal.  [id number]        POC Glucose 02/23/2018 213* 60 - 115 mg/dl Final    Performed on 02/23/2018 ACCU-CHEK   Final    Magnesium 02/24/2018 1.9  1.7 - 2.3 mg/dL Final    Cholesterol, Total 02/24/2018 118  0 - 199 mg/dL Final    Triglycerides 02/24/2018 141  0 - 200 mg/dL Final    HDL 02/24/2018 34* 40 - 59 mg/dL Final    Comment: ATP III HDL Cholestrol Classification is low.   Expected Values:    Males:    >55 = No Risk            35-55 = Moderate Risk            <35 = High Risk    Females:  >65 = No Risk            45-65 = Moderate Risk            <45 = High Risk    NCEP 02/24/2018 45.3* >60 Final    Comment: >60 mL/min/1.73m2 EGFR, calc. for ages 25 and older using the  MDRD formula (not corrected for weight), is valid for stable  renal function.  Calcium 02/24/2018 9.6  8.6 - 10.2 mg/dL Final    Hemoglobin A1C 02/24/2018 5.6  4.8 - 5.9 % Final    POC Glucose 02/23/2018 212* 60 - 115 mg/dl Final    Performed on 02/23/2018 ACCU-CHEK   Final    Ventricular Rate 02/24/2018 68  BPM Final    Atrial Rate 02/24/2018 68  BPM Final    P-R Interval 02/24/2018 224  ms Final    QRS Duration 02/24/2018 122  ms Final    Q-T Interval 02/24/2018 442  ms Final    QTc Calculation (Bazett) 02/24/2018 469  ms Final    P Axis 02/24/2018 41  degrees Final    R Axis 02/24/2018 -56  degrees Final    T Axis 02/24/2018 74  degrees Final    POC Glucose 02/24/2018 144* 60 - 115 mg/dl Final    Performed on 02/24/2018 ACCU-CHEK   Final    POC Glucose 02/24/2018 316* 60 - 115 mg/dl Final    Performed on 02/24/2018 ACCU-CHEK   Final    POC Glucose 02/24/2018 132* 60 - 115 mg/dl Final    Performed on 02/24/2018 ACCU-CHEK   Final    Sodium 02/25/2018 139  132 - 144 mEq/L Final    Potassium 02/25/2018 4.9  3.5 - 5.1 mEq/L Final    Chloride 02/25/2018 104  98 - 107 mEq/L Final    CO2 02/25/2018 21* 22 - 29 mEq/L Final    Anion Gap 02/25/2018 14* 7 - 13 mEq/L Final    Glucose 02/25/2018 147* 74 - 109 mg/dL Final    BUN 02/25/2018 27* 8 - 23 mg/dL Final    CREATININE 02/25/2018 1.44* 0.50 - 0.90 mg/dL Final    GFR Non- 02/25/2018 37.1* >60 Final    Comment: >60 mL/min/1.73m2 EGFR, calc. for ages 25 and older using the  MDRD formula (not corrected for weight), is valid for stable  renal function.  GFR  02/25/2018 44.9* >60 Final    Comment: >60 mL/min/1.73m2 EGFR, calc. for ages 25 and older using the  MDRD formula (not corrected for weight), is valid for stable  renal function.       Calcium 02/25/2018 10.2  8.6 - 10.2 mg/dL Final    WBC 02/25/2018 84.5  82.0 - 100.0 fL Final    MCH 02/16/2018 29.4  27.0 - 31.3 pg Final    MCHC 02/16/2018 34.7  33.0 - 37.0 % Final    RDW 02/16/2018 14.5  11.5 - 14.5 % Final    Platelets 97/35/5720 199  130 - 400 K/uL Final    Sodium 02/16/2018 140  132 - 144 mEq/L Final    Potassium reflex Magnesium 02/16/2018 5.0  3.5 - 5.1 mEq/L Final    Chloride 02/16/2018 104  98 - 107 mEq/L Final    CO2 02/16/2018 22  22 - 29 mEq/L Final    Anion Gap 02/16/2018 14* 7 - 13 mEq/L Final    Glucose 02/16/2018 196* 74 - 109 mg/dL Final    BUN 02/16/2018 32* 8 - 23 mg/dL Final    CREATININE 02/16/2018 1.44* 0.50 - 0.90 mg/dL Final    GFR Non- 02/16/2018 37.1* >60 Final    Comment: >60 mL/min/1.73m2 EGFR, calc. for ages 25 and older using the  MDRD formula (not corrected for weight), is valid for stable  renal function.  GFR  02/16/2018 44.9* >60 Final    Comment: >60 mL/min/1.73m2 EGFR, calc. for ages 25 and older using the  MDRD formula (not corrected for weight), is valid for stable  renal function.       Calcium 02/16/2018 10.0  8.6 - 10.2 mg/dL Final    Hemoglobin A1C 02/16/2018 5.7  4.8 - 5.9 % Final    POC Glucose 02/16/2018 296* 60 - 115 mg/dl Final    Performed on 02/16/2018 ACCU-CHEK   Final    ACT-K 02/16/2018 224* 82 - 152 sec Final    Sample Type 02/16/2018 ART   Final    Performed on 02/16/2018 SEE BELOW   Final    ACT-K 02/16/2018 202* 82 - 152 sec Final    Sample Type 02/16/2018 ART   Final    Performed on 02/16/2018 SEE BELOW   Final    POC Glucose 02/16/2018 197* 60 - 115 mg/dl Final    Performed on 02/16/2018 ACCU-CHEK   Final    POC Glucose 02/17/2018 213* 60 - 115 mg/dl Final    Performed on 02/17/2018 ACCU-CHEK   Final    POC Glucose 02/17/2018 238* 60 - 115 mg/dl Final    Performed on 02/17/2018 ACCU-CHEK   Final   Admission on 02/09/2018, Discharged on 02/09/2018   Component Date Value Ref Range Status    WBC 02/09/2018 9.2  4.8 - 10.8 K/uL Final    RBC 02/09/2018 3.61* 4.20 - 5.40 M/uL Final    Hemoglobin 02/09/2018 10.5* 12.0 - 16.0 g/dL Final    Hematocrit 02/09/2018 30.9* 37.0 - 47.0 % Final    MCV 02/09/2018 85.4  82.0 - 100.0 fL Final    MCH 02/09/2018 29.1  27.0 - 31.3 pg Final    MCHC 02/09/2018 34.1  33.0 - 37.0 % Final    RDW 02/09/2018 14.6* 11.5 - 14.5 % Final    Platelets 61/81/4997 206  130 - 400 K/uL Final    Sodium 02/09/2018 139  132 - 144 mEq/L Final    Potassium 02/09/2018 5.5* 3.5 - 5.1 mEq/L Final    Chloride 02/09/2018 103  98 - 107 mEq/L Final    CO2 02/09/2018 22  22 - 29 mEq/L Final    Anion Gap 02/09/2018 14* 7 - 13 mEq/L Final    Glucose 02/09/2018 125* 74 - 109 mg/dL Final    BUN 02/09/2018 31* 8 - 23 mg/dL Final    CREATININE 02/09/2018 1.63* 0.50 - 0.90 mg/dL Final    GFR Non- 02/09/2018 32.2* >60 Final    Comment: >60 mL/min/1.73m2 EGFR, calc. for ages 25 and older using the  MDRD formula (not corrected for weight), is valid for stable  renal function.  GFR  02/09/2018 38.9* >60 Final    Comment: >60 mL/min/1.73m2 EGFR, calc. for ages 25 and older using the  MDRD formula (not corrected for weight), is valid for stable  renal function.  Calcium 02/09/2018 10.2  8.6 - 10.2 mg/dL Final   Orders Only on 02/06/2018   Component Date Value Ref Range Status    Cholesterol, Total 02/06/2018 139  0 - 199 mg/dL Final    Triglycerides 02/06/2018 156  0 - 200 mg/dL Final    HDL 02/06/2018 43  40 - 59 mg/dL Final    Comment: ATP III HDL Cholesterol Classification is Desirable. Expected Values:    Males:    >55 = No Risk            35-55 = Moderate Risk            <35 = High Risk    Females:  >65 = No Risk            45-65 = Moderate Risk            <45 = High Risk    NCEP Guidelines:   Third Report May 2001  >59 = negative risk factor for CHD  <40 = major risk factor for CHD      LDL Calculated 02/06/2018 65  0 - 129 mg/dL Final   Orders Only on 01/09/2018   Component Date Value Ref Component Date Value Ref Range Status    WBC 01/09/2018 8.6  4.8 - 10.8 K/uL Final    RBC 01/09/2018 3.81* 4.20 - 5.40 M/uL Final    Hemoglobin 01/09/2018 10.9* 12.0 - 16.0 g/dL Final    Hematocrit 01/09/2018 32.3* 37.0 - 47.0 % Final    MCV 01/09/2018 84.7  82.0 - 100.0 fL Final    MCH 01/09/2018 28.6  27.0 - 31.3 pg Final    MCHC 01/09/2018 33.7  33.0 - 37.0 % Final    RDW 01/09/2018 14.8* 11.5 - 14.5 % Final    Platelets 04/01/8635 222  130 - 400 K/uL Final   Orders Only on 12/11/2017   Component Date Value Ref Range Status    Hemoglobin A1C 10/16/2017 6.4  % Final   There may be more visits with results that are not included. HPI:    Hypertension   This is a chronic problem. The current episode started more than 1 year ago. The problem has been waxing and waning since onset. Pertinent negatives include no anxiety, blurred vision, chest pain, malaise/fatigue, neck pain, palpitations, shortness of breath or sweats. There are no associated agents to hypertension. Risk factors for coronary artery disease include diabetes mellitus, obesity, post-menopausal state and sedentary lifestyle. Past treatments include beta blockers and direct vasodilators. The current treatment provides significant improvement. Compliance problems include diet and exercise. Hypertensive end-organ damage includes CAD/MI and CVA. There is no history of angina, heart failure, PVD or renovascular disease. There is no history of sleep apnea or a thyroid problem. Diabetes   She presents for her follow-up diabetic visit. She has type 2 diabetes mellitus. Her disease course has been stable. Hypoglycemia symptoms include dizziness. Pertinent negatives for hypoglycemia include no confusion, nervousness/anxiousness, seizures, sweats or tremors. Associated symptoms include fatigue (when minimal exertion), foot paresthesias and weakness (left side of the body).  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 Laurian Lesches      x1 Dr. Wing Kussmaul, Dr Priscilla Hendrickson 2018    HYSTERECTOMY, TOTAL ABDOMINAL      one ovary intact, Dr Selam Lock, menorrhagia    TOE AMPUTATION Right     TOTAL KNEE ARTHROPLASTY  16    Dr Marilia Valdivia Marital status:      Spouse name: N/A    Number of children: 2    Years of education: N/A     Occupational History    disabled      Social History Main Topics    Smoking status: Passive Smoke Exposure - Never Smoker    Smokeless tobacco: Never Used    Alcohol use No    Drug use: No    Sexual activity: Not Currently     Other Topics Concern    Not on file     Social History Narrative    Born in [place], one of 5    Keeps in touch with twin sister Bubba Szymanski, , 2 children    Worked at [employer] due to mental illness    Lived at [employer], was discharged, returned to independent living in 2017 and has adjusted well    One son and one daughter, keep in touch       Family History   Problem Relation Age of Onset    Cancer Mother 76     survived   [facility] Hypertension Father     Diabetes Sister     Mental Illness Sister        Family and social history were reviewed and pertinent changes documented.     ALLERGIES    Codeine    Current Outpatient Prescriptions on File Prior to Visit   Medication Sig Dispense Refill    ARIPiprazole (ABILIFY) 10 MG tablet TAKE (1) TABLET BY MOUTH DAILY 90 tablet 1    Blood Pressure KIT 1 Device by Does not apply route daily 1 kit 0  atorvastatin (LIPITOR) 40 MG tablet TAKE 1 TABLET BY MOUTH NIGHTLY 90 tablet 1    traZODone (DESYREL) 50 MG tablet TAKE 1 TO 2 TABLETS BY MOUTH EVERY NIGHT AT BEDTIME AS NEEDED FOR INSOMNIA 180 tablet 1    aspirin 81 MG EC tablet TAKE 1 TABLET BY MOUTH DAILY 90 tablet 1    oxybutynin (DITROPAN-XL) 10 MG extended release tablet TAKE (1) TABLET BY MOUTH TWICE DAILY 180 tablet 1    NOVOFINE AUTOCOVER 30G X 8 MM MISC USE AS DIRECTED THREE TIMES A  each 2    NYAMYC 838875 UNIT/GM powder APPLY TO ABDOMINAL FOLDS EVERY 12 HOURS AS NEEDED 60 g 2    omeprazole (PRILOSEC) 20 MG delayed release capsule TAKE 1 CAPSULE BY MOUTH DAILY AS NEEDED FOR HEARTBURN 90 capsule 1    gabapentin (NEURONTIN) 300 MG capsule TAKE ONE CAPSULE BY MOUTH TWICE DAILY 180 capsule 0    MAPAP 500 MG tablet TAKE 1 TABLET BY MOUTH EVERY 6 HOURS AS NEEDED FOR PAIN OR FEVER 120 tablet 1    insulin glargine (LANTUS SOLOSTAR) 100 UNIT/ML injection pen Inject 30 Units into the skin 2 times daily 15 mL 2    isosorbide mononitrate (IMDUR) 30 MG extended release tablet Take 1 tablet by mouth daily 30 tablet 3    NOVOLOG FLEXPEN 100 UNIT/ML injection pen INJECT 20 UNITS SUBCUTANEOUSLY THREE TIMES A DAY BEFORE MEALS 15 mL 2    nitroGLYCERIN (NITROSTAT) 0.4 MG SL tablet DISSOLVE 1 TAB UNDER THE TONGUE AS NEEDED FOR CHEST PAIN EVERY 5 MINUTES UP TO 3 TIMES IF NO RELIEF CALL 911 75 tablet 2    Cholecalciferol (VITAMIN D3) 1000 units TABS TAKE (1) TABLET BY MOUTH DAILY 90 tablet 10    clopidogrel (PLAVIX) 75 MG tablet TAKE (1) TABLET BY MOUTH NIGHTLY 90 tablet 10    cyanocobalamin ([employer] VITAMIN B12) 1000 MCG tablet Take 1 tablet by mouth daily 30 tablet 5    sertraline (ZOLOFT) 50 MG tablet Take 1 tablet by mouth daily 30 tablet 5    lactobacillus acidophilus (FLORANEX) Take 4 tablets by mouth 3 times daily 20 tablet 0    docusate sodium (COLACE) 100 MG capsule TAKE (1) CAPSULE BY MOUTH TWICE DAILY 180 capsule 1    FREESTYLE LITE strip Today's diagnosis (in the context of chronic problems) was discussed with patient (/and caregiver if present), questions answered. Diabetes Counseling   Patient was again counseled regarding diabetes as a chronic illness with long term risk for complications affecting the kidneys, eyes, nerves, blood vessels. The importance of maintaining a healthy lifestyle, checking blood sugar daily at home and keeping log, watching blood pressure, caloric intake, weight and physical activity were also discussed during today's office visit. Side effects, adverse effects of the medication prescribed (or refilled) today, as well as treatment plan/ rationale and result expectations have (again) been discussed with the patient who expresses understanding and consents to proceed as outlined above. Additional advise included in the \"after visit summary\". Orders Placed This Encounter   Medications    sulfamethoxazole-trimethoprim (BACTRIM DS) 800-160 MG per tablet     Sig: Take 1 tablet by mouth nightly     Dispense:  30 tablet     Refill:  2    carvedilol (COREG) 6.25 MG tablet     Sig: Take 1 tablet by mouth 2 times daily (with meals)     Dispense:  180 tablet     Refill:  0       Orders Placed This Encounter   Procedures   [facility], MD [facility] Urology     Referral Priority:   Routine     Referral Type:   Consult for Advice and Opinion     Referral Reason:   Specialty Services Required     Referred to Provider:   Gomez Slade MD     Requested Specialty:   Urology     Number of Visits Requested:   1    POCT Glucose     Further workup and plan will be determined based on clinical progression and follow up test/ treatment results. Close follow up needed to evaluate treatment results and for care coordination. 3 months    I have reviewed the patient's medical and surgical, family and social history, health maintenance schedule, and updated the computerized patient record.     Please note

## 2018-03-22 ENCOUNTER — OFFICE VISIT (OUTPATIENT)
Dept: UROLOGY | Age: 58
End: 2018-03-22
Payer: MEDICARE

## 2018-03-22 VITALS
WEIGHT: 221 LBS | HEIGHT: 67 IN | SYSTOLIC BLOOD PRESSURE: 92 MMHG | BODY MASS INDEX: 34.69 KG/M2 | DIASTOLIC BLOOD PRESSURE: 60 MMHG | HEART RATE: 77 BPM

## 2018-03-22 DIAGNOSIS — N39.0 RECURRENT UTI: Primary | ICD-10-CM

## 2018-03-22 DIAGNOSIS — R33.9 URINARY RETENTION: ICD-10-CM

## 2018-03-22 LAB
BILIRUBIN, POC: ABNORMAL
BLOOD URINE, POC: ABNORMAL
CLARITY, POC: CLEAR
COLOR, POC: YELLOW
GLUCOSE URINE, POC: ABNORMAL
KETONES, POC: ABNORMAL
LEUKOCYTE EST, POC: ABNORMAL
NITRITE, POC: ABNORMAL
PH, POC: 7
POST VOID RESIDUAL (PVR): 139 ML
PROTEIN, POC: ABNORMAL
SPECIFIC GRAVITY, POC: 1.01
UROBILINOGEN, POC: 0.2

## 2018-03-22 PROCEDURE — G8482 FLU IMMUNIZE ORDER/ADMIN: HCPCS | Performed by: UROLOGY

## 2018-03-22 PROCEDURE — 51798 US URINE CAPACITY MEASURE: CPT | Performed by: UROLOGY

## 2018-03-22 PROCEDURE — 3014F SCREEN MAMMO DOC REV: CPT | Performed by: UROLOGY

## 2018-03-22 PROCEDURE — G8417 CALC BMI ABV UP PARAM F/U: HCPCS | Performed by: UROLOGY

## 2018-03-22 PROCEDURE — G8598 ASA/ANTIPLAT THER USED: HCPCS | Performed by: UROLOGY

## 2018-03-22 PROCEDURE — 1036F TOBACCO NON-USER: CPT | Performed by: UROLOGY

## 2018-03-22 PROCEDURE — 1111F DSCHRG MED/CURRENT MED MERGE: CPT | Performed by: UROLOGY

## 2018-03-22 PROCEDURE — G8427 DOCREV CUR MEDS BY ELIG CLIN: HCPCS | Performed by: UROLOGY

## 2018-03-22 PROCEDURE — 81003 URINALYSIS AUTO W/O SCOPE: CPT | Performed by: UROLOGY

## 2018-03-22 PROCEDURE — 99213 OFFICE O/P EST LOW 20 MIN: CPT | Performed by: UROLOGY

## 2018-03-22 PROCEDURE — 3017F COLORECTAL CA SCREEN DOC REV: CPT | Performed by: UROLOGY

## 2018-03-22 RX ORDER — NITROFURANTOIN MACROCRYSTALS 50 MG/1
CAPSULE ORAL
Qty: 90 CAPSULE | Refills: 0 | Status: SHIPPED | OUTPATIENT
Start: 2018-03-22 | End: 2018-07-24

## 2018-03-22 ASSESSMENT — PATIENT HEALTH QUESTIONNAIRE - PHQ9
SUM OF ALL RESPONSES TO PHQ QUESTIONS 1-9: 0
2. FEELING DOWN, DEPRESSED OR HOPELESS: 0
1. LITTLE INTEREST OR PLEASURE IN DOING THINGS: 0
SUM OF ALL RESPONSES TO PHQ9 QUESTIONS 1 & 2: 0

## 2018-03-22 NOTE — PROGRESS NOTES
[facility] EVALUATION NOTE                                                 H&P                                                                                                                                                 Reason for Visit  Current UTI secondary to stasis    History of Present Illness  Patient has been on oxybutynin XL for some time  Started developing UTIs after oxybutynin  I will stop the oxybutynin  Stop the Bactrim due to renal insufficiency  Start the patient on nitrofurantoin 50 mg p.o. q. Day  Follow-up 1 month to check residuals      Urologic Review of Systems/Symptoms  Denies hematuria  Denies dysuria  Denies incontinence  Denies flank pain  Other Urologic: History of detrusor instability recurrent UTIs    Review of Systems  Head and neck: No issues/reviewed  Cardiac: No recent issues/reviewed  Pulmonary: No issues/reviewed  Gastrointestinal: No issues/reviewed  Neurologic: No recent issues/reviewed  Extremities: No issues/reviewed  Lymphatics: No lymphadenopathy no change  Genitourinary: See above  Skin: No issues/reviewed  Hospitalization: None recently  Currently on oxybutynin and Bactrim daily  All 14 categories of Review of Systems otherwise reviewed no other findings reported.     Past Medical History:   Diagnosis Date    Anxiety     CAD S/P percutaneous coronary angioplasty 2015, 2018    stent per dr Lucia Keller    Diabetic nephropathy with proteinuria ([facility] [address] 2014    DJD (degenerative joint disease) of knee     Dr Cornel Donald GERD (gastroesophageal reflux disease)     Hemiparesis, left ([facility] [address] 2013    [facility] Ass[facility] ([facility])    History of seizures     History of type C viral hepatitis     HTN (hypertension)     Hyperlipidemia     Impaired mobility and activities of daily living     Mediastinal lymphadenopathy 2013    [place]    Metabolic syndrome     Neurogenic urinary incontinence 2013    Neuropathy in diabetes ([facility] [address]    

## 2018-03-26 RX ORDER — PEN NEEDLE, DIABETIC, SAFETY 30 GX3/16"
NEEDLE, DISPOSABLE MISCELLANEOUS
Qty: 100 EACH | Refills: 3 | Status: SHIPPED | OUTPATIENT
Start: 2018-03-26 | End: 2018-12-12 | Stop reason: SDUPTHER

## 2018-04-10 ENCOUNTER — OFFICE VISIT (OUTPATIENT)
Dept: INTERNAL MEDICINE CLINIC | Age: 58
End: 2018-04-10
Payer: MEDICARE

## 2018-04-10 ENCOUNTER — CARE COORDINATOR VISIT (OUTPATIENT)
Dept: CARE COORDINATION | Age: 58
End: 2018-04-10

## 2018-04-10 ENCOUNTER — TELEPHONE (OUTPATIENT)
Dept: ENDOCRINOLOGY | Age: 58
End: 2018-04-10

## 2018-04-10 VITALS
TEMPERATURE: 97.7 F | WEIGHT: 229 LBS | DIASTOLIC BLOOD PRESSURE: 60 MMHG | SYSTOLIC BLOOD PRESSURE: 110 MMHG | HEART RATE: 69 BPM | OXYGEN SATURATION: 99 % | HEIGHT: 67 IN | BODY MASS INDEX: 35.94 KG/M2

## 2018-04-10 DIAGNOSIS — G81.94 HEMIPARESIS, LEFT (HCC): Primary | Chronic | ICD-10-CM

## 2018-04-10 DIAGNOSIS — M17.12 PRIMARY OSTEOARTHRITIS OF LEFT KNEE: Chronic | ICD-10-CM

## 2018-04-10 DIAGNOSIS — I67.89 CEREBRAL MICROVASCULAR DISEASE: ICD-10-CM

## 2018-04-10 PROCEDURE — 1036F TOBACCO NON-USER: CPT | Performed by: INTERNAL MEDICINE

## 2018-04-10 PROCEDURE — G8427 DOCREV CUR MEDS BY ELIG CLIN: HCPCS | Performed by: INTERNAL MEDICINE

## 2018-04-10 PROCEDURE — G8598 ASA/ANTIPLAT THER USED: HCPCS | Performed by: INTERNAL MEDICINE

## 2018-04-10 PROCEDURE — G8417 CALC BMI ABV UP PARAM F/U: HCPCS | Performed by: INTERNAL MEDICINE

## 2018-04-10 PROCEDURE — 3014F SCREEN MAMMO DOC REV: CPT | Performed by: INTERNAL MEDICINE

## 2018-04-10 PROCEDURE — 99213 OFFICE O/P EST LOW 20 MIN: CPT | Performed by: INTERNAL MEDICINE

## 2018-04-10 PROCEDURE — 3017F COLORECTAL CA SCREEN DOC REV: CPT | Performed by: INTERNAL MEDICINE

## 2018-04-10 RX ORDER — LANCETS 28 GAUGE
EACH MISCELLANEOUS
Qty: 100 EACH | Refills: 3 | Status: SHIPPED | OUTPATIENT
Start: 2018-04-10 | End: 2019-04-02 | Stop reason: SDUPTHER

## 2018-04-10 ASSESSMENT — ENCOUNTER SYMPTOMS
TROUBLE SWALLOWING: 0
PHOTOPHOBIA: 0
ABDOMINAL PAIN: 0
SHORTNESS OF BREATH: 0
CHEST TIGHTNESS: 0
CHOKING: 0
BACK PAIN: 1
RESPIRATORY NEGATIVE: 1

## 2018-04-11 PROBLEM — R79.89 ELEVATED TROPONIN: Status: RESOLVED | Noted: 2018-02-23 | Resolved: 2018-04-11

## 2018-04-11 PROBLEM — R77.8 ELEVATED TROPONIN: Status: RESOLVED | Noted: 2018-02-23 | Resolved: 2018-04-11

## 2018-04-24 ENCOUNTER — OFFICE VISIT (OUTPATIENT)
Dept: UROLOGY | Age: 58
End: 2018-04-24
Payer: MEDICARE

## 2018-04-24 VITALS
BODY MASS INDEX: 35.84 KG/M2 | DIASTOLIC BLOOD PRESSURE: 62 MMHG | HEART RATE: 89 BPM | SYSTOLIC BLOOD PRESSURE: 90 MMHG | WEIGHT: 223 LBS | HEIGHT: 66 IN

## 2018-04-24 DIAGNOSIS — N39.0 RECURRENT UTI: Primary | ICD-10-CM

## 2018-04-24 DIAGNOSIS — R33.9 URINARY RETENTION: ICD-10-CM

## 2018-04-24 LAB
BILIRUBIN, POC: ABNORMAL
BLOOD URINE, POC: ABNORMAL
CLARITY, POC: CLEAR
COLOR, POC: YELLOW
GLUCOSE URINE, POC: ABNORMAL
KETONES, POC: ABNORMAL
LEUKOCYTE EST, POC: ABNORMAL
NITRITE, POC: ABNORMAL
PH, POC: 5
POST VOID RESIDUAL (PVR): 40 ML
PROTEIN, POC: ABNORMAL
SPECIFIC GRAVITY, POC: >=1.03
UROBILINOGEN, POC: 2

## 2018-04-24 PROCEDURE — G8427 DOCREV CUR MEDS BY ELIG CLIN: HCPCS | Performed by: UROLOGY

## 2018-04-24 PROCEDURE — G8417 CALC BMI ABV UP PARAM F/U: HCPCS | Performed by: UROLOGY

## 2018-04-24 PROCEDURE — 81003 URINALYSIS AUTO W/O SCOPE: CPT | Performed by: UROLOGY

## 2018-04-24 PROCEDURE — 3017F COLORECTAL CA SCREEN DOC REV: CPT | Performed by: UROLOGY

## 2018-04-24 PROCEDURE — 99213 OFFICE O/P EST LOW 20 MIN: CPT | Performed by: UROLOGY

## 2018-04-24 PROCEDURE — 51798 US URINE CAPACITY MEASURE: CPT | Performed by: UROLOGY

## 2018-04-24 PROCEDURE — 1036F TOBACCO NON-USER: CPT | Performed by: UROLOGY

## 2018-04-24 PROCEDURE — G8598 ASA/ANTIPLAT THER USED: HCPCS | Performed by: UROLOGY

## 2018-04-24 RX ORDER — NITROFURANTOIN MACROCRYSTALS 50 MG/1
50 CAPSULE ORAL NIGHTLY
Qty: 90 CAPSULE | Refills: 3 | Status: SHIPPED | OUTPATIENT
Start: 2018-04-24 | End: 2018-05-01

## 2018-05-03 RX ORDER — ACETAMINOPHEN 500 MG
TABLET ORAL
Qty: 120 TABLET | Refills: 1 | Status: SHIPPED | OUTPATIENT
Start: 2018-05-03 | End: 2018-06-26 | Stop reason: SDUPTHER

## 2018-05-03 RX ORDER — INSULIN ASPART 100 [IU]/ML
INJECTION, SOLUTION INTRAVENOUS; SUBCUTANEOUS
Qty: 15 ML | Refills: 3 | Status: SHIPPED | OUTPATIENT
Start: 2018-05-03 | End: 2018-09-07 | Stop reason: SDUPTHER

## 2018-05-07 ENCOUNTER — TELEPHONE (OUTPATIENT)
Dept: INTERNAL MEDICINE CLINIC | Age: 58
End: 2018-05-07

## 2018-05-07 ENCOUNTER — CARE COORDINATION (OUTPATIENT)
Dept: CARE COORDINATION | Age: 58
End: 2018-05-07

## 2018-05-11 RX ORDER — BLOOD-GLUCOSE METER
KIT MISCELLANEOUS
Qty: 100 EACH | Refills: 3 | Status: SHIPPED | OUTPATIENT
Start: 2018-05-11 | End: 2018-12-04 | Stop reason: SDUPTHER

## 2018-05-14 ENCOUNTER — CARE COORDINATION (OUTPATIENT)
Dept: CARE COORDINATION | Age: 58
End: 2018-05-14

## 2018-05-14 RX ORDER — PATIROMER 8.4 G/1
POWDER, FOR SUSPENSION ORAL
Refills: 6 | COMMUNITY
Start: 2018-05-10 | End: 2018-06-08

## 2018-05-22 ENCOUNTER — CARE COORDINATION (OUTPATIENT)
Dept: CARE COORDINATION | Age: 58
End: 2018-05-22

## 2018-05-22 DIAGNOSIS — I10 ESSENTIAL HYPERTENSION: Chronic | ICD-10-CM

## 2018-05-22 DIAGNOSIS — E11.42 DIABETIC POLYNEUROPATHY ASSOCIATED WITH TYPE 2 DIABETES MELLITUS (HCC): ICD-10-CM

## 2018-05-22 RX ORDER — CARVEDILOL 6.25 MG/1
TABLET ORAL
Qty: 180 TABLET | Refills: 1 | Status: SHIPPED | OUTPATIENT
Start: 2018-05-22 | End: 2018-12-04 | Stop reason: SDUPTHER

## 2018-05-22 RX ORDER — GABAPENTIN 300 MG/1
CAPSULE ORAL
Qty: 180 CAPSULE | Refills: 0 | Status: SHIPPED | OUTPATIENT
Start: 2018-05-22 | End: 2018-06-08 | Stop reason: SDUPTHER

## 2018-05-22 RX ORDER — NYSTATIN 100000 [USP'U]/G
POWDER TOPICAL
Qty: 60 G | Refills: 2 | Status: SHIPPED | OUTPATIENT
Start: 2018-05-22 | End: 2018-08-30 | Stop reason: SDUPTHER

## 2018-05-30 LAB
ANION GAP SERPL CALCULATED.3IONS-SCNC: 18 MEQ/L (ref 7–13)
BUN BLDV-MCNC: 31 MG/DL (ref 8–23)
CALCIUM SERPL-MCNC: 9.6 MG/DL (ref 8.6–10.2)
CHLORIDE BLD-SCNC: 97 MEQ/L (ref 98–107)
CO2: 20 MEQ/L (ref 22–29)
CREAT SERPL-MCNC: 1.35 MG/DL (ref 0.5–0.9)
GFR AFRICAN AMERICAN: 48.3
GFR NON-AFRICAN AMERICAN: 40
GLUCOSE BLD-MCNC: 285 MG/DL (ref 74–109)
POTASSIUM SERPL-SCNC: 4.6 MEQ/L (ref 3.5–5.1)
SODIUM BLD-SCNC: 135 MEQ/L (ref 132–144)

## 2018-06-08 ENCOUNTER — CARE COORDINATION (OUTPATIENT)
Dept: CARE COORDINATION | Age: 58
End: 2018-06-08

## 2018-06-08 ENCOUNTER — OFFICE VISIT (OUTPATIENT)
Dept: INTERNAL MEDICINE CLINIC | Age: 58
End: 2018-06-08
Payer: MEDICARE

## 2018-06-08 VITALS
OXYGEN SATURATION: 97 % | BODY MASS INDEX: 36.64 KG/M2 | SYSTOLIC BLOOD PRESSURE: 136 MMHG | HEIGHT: 66 IN | WEIGHT: 228 LBS | DIASTOLIC BLOOD PRESSURE: 88 MMHG | HEART RATE: 81 BPM

## 2018-06-08 DIAGNOSIS — E11.40 CONTROLLED TYPE 2 DIABETES MELLITUS WITH DIABETIC NEUROPATHY, WITH LONG-TERM CURRENT USE OF INSULIN (HCC): Chronic | ICD-10-CM

## 2018-06-08 DIAGNOSIS — Z79.4 CONTROLLED TYPE 2 DIABETES MELLITUS WITH DIABETIC NEUROPATHY, WITH LONG-TERM CURRENT USE OF INSULIN (HCC): Chronic | ICD-10-CM

## 2018-06-08 DIAGNOSIS — Z01.818 PREOP GENERAL PHYSICAL EXAM: Primary | Chronic | ICD-10-CM

## 2018-06-08 PROBLEM — I25.10 CAD IN NATIVE ARTERY: Status: RESOLVED | Noted: 2018-02-16 | Resolved: 2018-06-08

## 2018-06-08 PROCEDURE — G8428 CUR MEDS NOT DOCUMENT: HCPCS | Performed by: INTERNAL MEDICINE

## 2018-06-08 PROCEDURE — 2022F DILAT RTA XM EVC RTNOPTHY: CPT | Performed by: INTERNAL MEDICINE

## 2018-06-08 PROCEDURE — 99213 OFFICE O/P EST LOW 20 MIN: CPT | Performed by: INTERNAL MEDICINE

## 2018-06-08 PROCEDURE — G8417 CALC BMI ABV UP PARAM F/U: HCPCS | Performed by: INTERNAL MEDICINE

## 2018-06-08 PROCEDURE — 3044F HG A1C LEVEL LT 7.0%: CPT | Performed by: INTERNAL MEDICINE

## 2018-06-08 PROCEDURE — 1036F TOBACCO NON-USER: CPT | Performed by: INTERNAL MEDICINE

## 2018-06-08 PROCEDURE — G8598 ASA/ANTIPLAT THER USED: HCPCS | Performed by: INTERNAL MEDICINE

## 2018-06-08 PROCEDURE — 3017F COLORECTAL CA SCREEN DOC REV: CPT | Performed by: INTERNAL MEDICINE

## 2018-06-08 RX ORDER — GABAPENTIN 300 MG/1
300 CAPSULE ORAL DAILY PRN
Qty: 90 CAPSULE | Refills: 0
Start: 2018-06-08 | End: 2018-08-30 | Stop reason: SDUPTHER

## 2018-06-08 ASSESSMENT — ENCOUNTER SYMPTOMS
SORE THROAT: 0
ABDOMINAL PAIN: 0
DIARRHEA: 0
CHOKING: 0
BACK PAIN: 1
SHORTNESS OF BREATH: 0
TROUBLE SWALLOWING: 0
CHEST TIGHTNESS: 0
SINUS PAIN: 0
PHOTOPHOBIA: 0

## 2018-06-14 ENCOUNTER — HOSPITAL ENCOUNTER (OUTPATIENT)
Dept: PREADMISSION TESTING | Age: 58
Discharge: HOME | End: 2018-06-18
Payer: MEDICARE

## 2018-06-14 VITALS
BODY MASS INDEX: 36.8 KG/M2 | WEIGHT: 229 LBS | SYSTOLIC BLOOD PRESSURE: 114 MMHG | HEART RATE: 78 BPM | RESPIRATION RATE: 20 BRPM | TEMPERATURE: 98.2 F | OXYGEN SATURATION: 97 % | HEIGHT: 66 IN | DIASTOLIC BLOOD PRESSURE: 67 MMHG

## 2018-06-14 LAB
ABO/RH: NORMAL
ANION GAP SERPL CALCULATED.3IONS-SCNC: 15 MEQ/L (ref 7–13)
ANTIBODY SCREEN: NORMAL
BACTERIA: NORMAL /HPF
BILIRUBIN URINE: NEGATIVE
BLOOD, URINE: NEGATIVE
BUN BLDV-MCNC: 23 MG/DL (ref 8–23)
CALCIUM SERPL-MCNC: 10.3 MG/DL (ref 8.6–10.2)
CHLORIDE BLD-SCNC: 97 MEQ/L (ref 98–107)
CLARITY: CLEAR
CO2: 22 MEQ/L (ref 22–29)
COLOR: YELLOW
CREAT SERPL-MCNC: 1.25 MG/DL (ref 0.5–0.9)
EPITHELIAL CELLS, UA: NORMAL /HPF
GFR AFRICAN AMERICAN: 52.8
GFR NON-AFRICAN AMERICAN: 43.7
GLUCOSE BLD-MCNC: 345 MG/DL (ref 74–109)
GLUCOSE URINE: >=1000 MG/DL
HCT VFR BLD CALC: 32.4 % (ref 37–47)
HEMOGLOBIN: 11.3 G/DL (ref 12–16)
KETONES, URINE: NEGATIVE MG/DL
LEUKOCYTE ESTERASE, URINE: NEGATIVE
MCH RBC QN AUTO: 29.4 PG (ref 27–31.3)
MCHC RBC AUTO-ENTMCNC: 34.8 % (ref 33–37)
MCV RBC AUTO: 84.4 FL (ref 82–100)
NITRITE, URINE: NEGATIVE
PDW BLD-RTO: 13.8 % (ref 11.5–14.5)
PH UA: 5.5 (ref 5–9)
PLATELET # BLD: 236 K/UL (ref 130–400)
POTASSIUM SERPL-SCNC: 4.4 MEQ/L (ref 3.5–5.1)
PROTEIN UA: >=300 MG/DL
RBC # BLD: 3.84 M/UL (ref 4.2–5.4)
RBC UA: NORMAL /HPF (ref 0–2)
SODIUM BLD-SCNC: 134 MEQ/L (ref 132–144)
SPECIFIC GRAVITY UA: 1.02 (ref 1–1.03)
URINE REFLEX TO CULTURE: YES
UROBILINOGEN, URINE: 1 E.U./DL
WBC # BLD: 8.7 K/UL (ref 4.8–10.8)
WBC UA: NORMAL /HPF (ref 0–5)

## 2018-06-14 PROCEDURE — 86901 BLOOD TYPING SEROLOGIC RH(D): CPT

## 2018-06-14 PROCEDURE — 80048 BASIC METABOLIC PNL TOTAL CA: CPT

## 2018-06-14 PROCEDURE — 85027 COMPLETE CBC AUTOMATED: CPT

## 2018-06-14 PROCEDURE — 87086 URINE CULTURE/COLONY COUNT: CPT

## 2018-06-14 PROCEDURE — 86850 RBC ANTIBODY SCREEN: CPT

## 2018-06-14 PROCEDURE — 81001 URINALYSIS AUTO W/SCOPE: CPT

## 2018-06-14 PROCEDURE — 86900 BLOOD TYPING SEROLOGIC ABO: CPT

## 2018-06-14 RX ORDER — SODIUM CHLORIDE 0.9 % (FLUSH) 0.9 %
10 SYRINGE (ML) INJECTION EVERY 12 HOURS SCHEDULED
Status: CANCELLED | OUTPATIENT
Start: 2018-06-14

## 2018-06-14 RX ORDER — LIDOCAINE HYDROCHLORIDE 10 MG/ML
1 INJECTION, SOLUTION EPIDURAL; INFILTRATION; INTRACAUDAL; PERINEURAL
Status: CANCELLED | OUTPATIENT
Start: 2018-06-14 | End: 2018-06-14

## 2018-06-14 RX ORDER — SODIUM CHLORIDE, SODIUM LACTATE, POTASSIUM CHLORIDE, CALCIUM CHLORIDE 600; 310; 30; 20 MG/100ML; MG/100ML; MG/100ML; MG/100ML
INJECTION, SOLUTION INTRAVENOUS CONTINUOUS
Status: CANCELLED | OUTPATIENT
Start: 2018-06-14

## 2018-06-14 RX ORDER — SODIUM CHLORIDE 0.9 % (FLUSH) 0.9 %
10 SYRINGE (ML) INJECTION PRN
Status: CANCELLED | OUTPATIENT
Start: 2018-06-14

## 2018-06-16 LAB — URINE CULTURE, ROUTINE: NORMAL

## 2018-06-19 ENCOUNTER — OFFICE VISIT (OUTPATIENT)
Dept: CARDIOLOGY CLINIC | Age: 58
End: 2018-06-19
Payer: MEDICARE

## 2018-06-19 VITALS
HEART RATE: 90 BPM | OXYGEN SATURATION: 96 % | HEIGHT: 66 IN | DIASTOLIC BLOOD PRESSURE: 73 MMHG | BODY MASS INDEX: 36.8 KG/M2 | WEIGHT: 229 LBS | RESPIRATION RATE: 16 BRPM | SYSTOLIC BLOOD PRESSURE: 134 MMHG

## 2018-06-19 DIAGNOSIS — E78.2 MIXED HYPERLIPIDEMIA: Primary | Chronic | ICD-10-CM

## 2018-06-19 DIAGNOSIS — Z95.5 STENTED CORONARY ARTERY: ICD-10-CM

## 2018-06-19 DIAGNOSIS — Z01.818 PRE-OP EVALUATION: ICD-10-CM

## 2018-06-19 DIAGNOSIS — I25.119 CORONARY ARTERY DISEASE INVOLVING NATIVE HEART WITH ANGINA PECTORIS, UNSPECIFIED VESSEL OR LESION TYPE (HCC): Chronic | ICD-10-CM

## 2018-06-19 DIAGNOSIS — I10 ESSENTIAL HYPERTENSION: Chronic | ICD-10-CM

## 2018-06-19 DIAGNOSIS — I20.9 ANGINA, CLASS III (HCC): ICD-10-CM

## 2018-06-19 PROCEDURE — 99213 OFFICE O/P EST LOW 20 MIN: CPT | Performed by: INTERNAL MEDICINE

## 2018-06-19 PROCEDURE — 1036F TOBACCO NON-USER: CPT | Performed by: INTERNAL MEDICINE

## 2018-06-19 PROCEDURE — G8417 CALC BMI ABV UP PARAM F/U: HCPCS | Performed by: INTERNAL MEDICINE

## 2018-06-19 PROCEDURE — 93000 ELECTROCARDIOGRAM COMPLETE: CPT | Performed by: INTERNAL MEDICINE

## 2018-06-19 PROCEDURE — G8428 CUR MEDS NOT DOCUMENT: HCPCS | Performed by: INTERNAL MEDICINE

## 2018-06-19 PROCEDURE — 3017F COLORECTAL CA SCREEN DOC REV: CPT | Performed by: INTERNAL MEDICINE

## 2018-06-19 PROCEDURE — G8598 ASA/ANTIPLAT THER USED: HCPCS | Performed by: INTERNAL MEDICINE

## 2018-06-21 ENCOUNTER — HOSPITAL ENCOUNTER (INPATIENT)
Age: 58
LOS: 3 days | Discharge: NURSING, REHAB OR LONG-TERM CARE | DRG: 470 | End: 2018-06-24
Attending: ORTHOPAEDIC SURGERY | Admitting: ORTHOPAEDIC SURGERY
Payer: MEDICARE

## 2018-06-21 ENCOUNTER — PREP FOR PROCEDURE (OUTPATIENT)
Dept: ORTHOPEDIC SURGERY | Age: 58
End: 2018-06-21

## 2018-06-21 ENCOUNTER — APPOINTMENT (OUTPATIENT)
Dept: GENERAL RADIOLOGY | Age: 58
DRG: 470 | End: 2018-06-21
Attending: ORTHOPAEDIC SURGERY
Payer: MEDICARE

## 2018-06-21 ENCOUNTER — ANESTHESIA EVENT (OUTPATIENT)
Dept: OPERATING ROOM | Age: 58
DRG: 470 | End: 2018-06-21
Payer: MEDICARE

## 2018-06-21 ENCOUNTER — ANESTHESIA (OUTPATIENT)
Dept: OPERATING ROOM | Age: 58
DRG: 470 | End: 2018-06-21
Payer: MEDICARE

## 2018-06-21 VITALS — SYSTOLIC BLOOD PRESSURE: 128 MMHG | OXYGEN SATURATION: 100 % | TEMPERATURE: 95.9 F | DIASTOLIC BLOOD PRESSURE: 61 MMHG

## 2018-06-21 DIAGNOSIS — Z96.651 STATUS POST TOTAL KNEE REPLACEMENT, RIGHT: Primary | ICD-10-CM

## 2018-06-21 PROBLEM — Z96.652 STATUS POST TOTAL LEFT KNEE REPLACEMENT: Status: ACTIVE | Noted: 2018-06-21

## 2018-06-21 LAB
GLUCOSE BLD-MCNC: 261 MG/DL (ref 60–115)
GLUCOSE BLD-MCNC: 314 MG/DL (ref 60–115)
GLUCOSE BLD-MCNC: 373 MG/DL (ref 60–115)
GLUCOSE BLD-MCNC: 418 MG/DL (ref 60–115)
GLUCOSE BLD-MCNC: 421 MG/DL (ref 60–115)
PERFORMED ON: ABNORMAL

## 2018-06-21 PROCEDURE — 6360000002 HC RX W HCPCS: Performed by: ORTHOPAEDIC SURGERY

## 2018-06-21 PROCEDURE — 6360000002 HC RX W HCPCS: Performed by: NURSE ANESTHETIST, CERTIFIED REGISTERED

## 2018-06-21 PROCEDURE — 2580000003 HC RX 258: Performed by: ORTHOPAEDIC SURGERY

## 2018-06-21 PROCEDURE — 7100000001 HC PACU RECOVERY - ADDTL 15 MIN: Performed by: ORTHOPAEDIC SURGERY

## 2018-06-21 PROCEDURE — 6370000000 HC RX 637 (ALT 250 FOR IP): Performed by: PHYSICIAN ASSISTANT

## 2018-06-21 PROCEDURE — 2500000003 HC RX 250 WO HCPCS: Performed by: NURSE PRACTITIONER

## 2018-06-21 PROCEDURE — 3600000004 HC SURGERY LEVEL 4 BASE: Performed by: ORTHOPAEDIC SURGERY

## 2018-06-21 PROCEDURE — 2720000010 HC SURG SUPPLY STERILE: Performed by: ORTHOPAEDIC SURGERY

## 2018-06-21 PROCEDURE — 6370000000 HC RX 637 (ALT 250 FOR IP): Performed by: ORTHOPAEDIC SURGERY

## 2018-06-21 PROCEDURE — 2580000003 HC RX 258

## 2018-06-21 PROCEDURE — 3600000014 HC SURGERY LEVEL 4 ADDTL 15MIN: Performed by: ORTHOPAEDIC SURGERY

## 2018-06-21 PROCEDURE — 2500000003 HC RX 250 WO HCPCS: Performed by: PHYSICIAN ASSISTANT

## 2018-06-21 PROCEDURE — C1713 ANCHOR/SCREW BN/BN,TIS/BN: HCPCS | Performed by: ORTHOPAEDIC SURGERY

## 2018-06-21 PROCEDURE — 3700000000 HC ANESTHESIA ATTENDED CARE: Performed by: ORTHOPAEDIC SURGERY

## 2018-06-21 PROCEDURE — 6360000002 HC RX W HCPCS: Performed by: ANESTHESIOLOGY

## 2018-06-21 PROCEDURE — 97166 OT EVAL MOD COMPLEX 45 MIN: CPT

## 2018-06-21 PROCEDURE — 2580000003 HC RX 258: Performed by: NURSE ANESTHETIST, CERTIFIED REGISTERED

## 2018-06-21 PROCEDURE — 1210000000 HC MED SURG R&B

## 2018-06-21 PROCEDURE — 97162 PT EVAL MOD COMPLEX 30 MIN: CPT

## 2018-06-21 PROCEDURE — C1776 JOINT DEVICE (IMPLANTABLE): HCPCS | Performed by: ORTHOPAEDIC SURGERY

## 2018-06-21 PROCEDURE — 64445 NJX AA&/STRD SCIATIC NRV IMG: CPT | Performed by: ANESTHESIOLOGY

## 2018-06-21 PROCEDURE — 73560 X-RAY EXAM OF KNEE 1 OR 2: CPT

## 2018-06-21 PROCEDURE — G8987 SELF CARE CURRENT STATUS: HCPCS

## 2018-06-21 PROCEDURE — 0SRD0J9 REPLACEMENT OF LEFT KNEE JOINT WITH SYNTHETIC SUBSTITUTE, CEMENTED, OPEN APPROACH: ICD-10-PCS | Performed by: ORTHOPAEDIC SURGERY

## 2018-06-21 PROCEDURE — 97116 GAIT TRAINING THERAPY: CPT

## 2018-06-21 PROCEDURE — 7100000000 HC PACU RECOVERY - FIRST 15 MIN: Performed by: ORTHOPAEDIC SURGERY

## 2018-06-21 PROCEDURE — 6370000000 HC RX 637 (ALT 250 FOR IP): Performed by: NURSE PRACTITIONER

## 2018-06-21 PROCEDURE — 2500000003 HC RX 250 WO HCPCS: Performed by: NURSE ANESTHETIST, CERTIFIED REGISTERED

## 2018-06-21 PROCEDURE — 2580000003 HC RX 258: Performed by: NURSE PRACTITIONER

## 2018-06-21 PROCEDURE — 3700000001 HC ADD 15 MINUTES (ANESTHESIA): Performed by: ORTHOPAEDIC SURGERY

## 2018-06-21 PROCEDURE — G8978 MOBILITY CURRENT STATUS: HCPCS

## 2018-06-21 PROCEDURE — G8979 MOBILITY GOAL STATUS: HCPCS

## 2018-06-21 PROCEDURE — 6360000002 HC RX W HCPCS

## 2018-06-21 PROCEDURE — G8988 SELF CARE GOAL STATUS: HCPCS

## 2018-06-21 PROCEDURE — 2500000003 HC RX 250 WO HCPCS: Performed by: ORTHOPAEDIC SURGERY

## 2018-06-21 DEVICE — COMPONENT TOT KNEE CAPPED STD STRYKNEES] STRYKER CORP]: Type: IMPLANTABLE DEVICE | Site: KNEE | Status: FUNCTIONAL

## 2018-06-21 DEVICE — CEMENT BNE 20ML 40GM ACRYL RESIN HI VISC RADPQ FAST SET: Type: IMPLANTABLE DEVICE | Site: KNEE | Status: FUNCTIONAL

## 2018-06-21 DEVICE — BASEPLATE TIB SZ 3 KNEE TRITANIUM CEM PRI LO PROF TRIATHLON: Type: IMPLANTABLE DEVICE | Site: KNEE | Status: FUNCTIONAL

## 2018-06-21 DEVICE — COMPONENT FEM SZ 3 L KNEE POST STBL CEM TRIATHLON: Type: IMPLANTABLE DEVICE | Site: KNEE | Status: FUNCTIONAL

## 2018-06-21 DEVICE — INSERT TIB SZ 3 THK9MM UNIV KNEE CONVENTIONAL POLYETH POST: Type: IMPLANTABLE DEVICE | Site: KNEE | Status: FUNCTIONAL

## 2018-06-21 DEVICE — COMPONENT PAT DIA29MM THK9MM SUP INFERIOR KNEE CONVENTIONAL: Type: IMPLANTABLE DEVICE | Site: KNEE | Status: FUNCTIONAL

## 2018-06-21 RX ORDER — INSULIN GLARGINE 100 [IU]/ML
28 INJECTION, SOLUTION SUBCUTANEOUS 2 TIMES DAILY
Status: DISCONTINUED | OUTPATIENT
Start: 2018-06-21 | End: 2018-06-24 | Stop reason: HOSPADM

## 2018-06-21 RX ORDER — FENTANYL CITRATE 50 UG/ML
50 INJECTION, SOLUTION INTRAMUSCULAR; INTRAVENOUS EVERY 10 MIN PRN
Status: DISCONTINUED | OUTPATIENT
Start: 2018-06-21 | End: 2018-06-21 | Stop reason: HOSPADM

## 2018-06-21 RX ORDER — DEXAMETHASONE SODIUM PHOSPHATE 10 MG/ML
INJECTION INTRAMUSCULAR; INTRAVENOUS PRN
Status: DISCONTINUED | OUTPATIENT
Start: 2018-06-21 | End: 2018-06-21 | Stop reason: SDUPTHER

## 2018-06-21 RX ORDER — SODIUM CHLORIDE 0.9 % (FLUSH) 0.9 %
10 SYRINGE (ML) INJECTION EVERY 12 HOURS SCHEDULED
Status: DISCONTINUED | OUTPATIENT
Start: 2018-06-21 | End: 2018-06-21 | Stop reason: HOSPADM

## 2018-06-21 RX ORDER — CEFAZOLIN SODIUM 1 G/50ML
INJECTION, SOLUTION INTRAVENOUS PRN
Status: DISCONTINUED | OUTPATIENT
Start: 2018-06-21 | End: 2018-06-21 | Stop reason: SDUPTHER

## 2018-06-21 RX ORDER — DOCUSATE SODIUM 100 MG/1
100 CAPSULE, LIQUID FILLED ORAL 2 TIMES DAILY
Status: DISCONTINUED | OUTPATIENT
Start: 2018-06-21 | End: 2018-06-24 | Stop reason: HOSPADM

## 2018-06-21 RX ORDER — PANTOPRAZOLE SODIUM 40 MG/1
40 TABLET, DELAYED RELEASE ORAL
Status: DISCONTINUED | OUTPATIENT
Start: 2018-06-22 | End: 2018-06-24 | Stop reason: HOSPADM

## 2018-06-21 RX ORDER — GABAPENTIN 300 MG/1
300 CAPSULE ORAL DAILY PRN
Status: DISCONTINUED | OUTPATIENT
Start: 2018-06-21 | End: 2018-06-24 | Stop reason: HOSPADM

## 2018-06-21 RX ORDER — SODIUM CHLORIDE 0.9 % (FLUSH) 0.9 %
10 SYRINGE (ML) INJECTION EVERY 12 HOURS SCHEDULED
Status: DISCONTINUED | OUTPATIENT
Start: 2018-06-21 | End: 2018-06-24 | Stop reason: HOSPADM

## 2018-06-21 RX ORDER — SODIUM CHLORIDE, SODIUM LACTATE, POTASSIUM CHLORIDE, CALCIUM CHLORIDE 600; 310; 30; 20 MG/100ML; MG/100ML; MG/100ML; MG/100ML
INJECTION, SOLUTION INTRAVENOUS
Status: COMPLETED
Start: 2018-06-21 | End: 2018-06-21

## 2018-06-21 RX ORDER — METOCLOPRAMIDE HYDROCHLORIDE 5 MG/ML
10 INJECTION INTRAMUSCULAR; INTRAVENOUS
Status: DISCONTINUED | OUTPATIENT
Start: 2018-06-21 | End: 2018-06-21 | Stop reason: HOSPADM

## 2018-06-21 RX ORDER — DIPHENHYDRAMINE HYDROCHLORIDE 50 MG/ML
12.5 INJECTION INTRAMUSCULAR; INTRAVENOUS
Status: DISCONTINUED | OUTPATIENT
Start: 2018-06-21 | End: 2018-06-21 | Stop reason: HOSPADM

## 2018-06-21 RX ORDER — SODIUM CHLORIDE 0.9 % (FLUSH) 0.9 %
10 SYRINGE (ML) INJECTION PRN
Status: DISCONTINUED | OUTPATIENT
Start: 2018-06-21 | End: 2018-06-24 | Stop reason: HOSPADM

## 2018-06-21 RX ORDER — ACETAMINOPHEN 500 MG
500 TABLET ORAL EVERY 6 HOURS PRN
Status: DISCONTINUED | OUTPATIENT
Start: 2018-06-21 | End: 2018-06-24 | Stop reason: HOSPADM

## 2018-06-21 RX ORDER — OXYCODONE HYDROCHLORIDE 5 MG/1
5 TABLET ORAL EVERY 4 HOURS PRN
Status: DISCONTINUED | OUTPATIENT
Start: 2018-06-21 | End: 2018-06-23

## 2018-06-21 RX ORDER — KETOROLAC TROMETHAMINE 30 MG/ML
30 INJECTION, SOLUTION INTRAMUSCULAR; INTRAVENOUS EVERY 6 HOURS
Status: CANCELLED | OUTPATIENT
Start: 2018-06-21 | End: 2018-06-21

## 2018-06-21 RX ORDER — ONDANSETRON 2 MG/ML
INJECTION INTRAMUSCULAR; INTRAVENOUS PRN
Status: DISCONTINUED | OUTPATIENT
Start: 2018-06-21 | End: 2018-06-21 | Stop reason: SDUPTHER

## 2018-06-21 RX ORDER — MORPHINE SULFATE 2 MG/ML
4 INJECTION, SOLUTION INTRAMUSCULAR; INTRAVENOUS
Status: CANCELLED | OUTPATIENT
Start: 2018-06-21

## 2018-06-21 RX ORDER — SODIUM CHLORIDE, SODIUM LACTATE, POTASSIUM CHLORIDE, CALCIUM CHLORIDE 600; 310; 30; 20 MG/100ML; MG/100ML; MG/100ML; MG/100ML
INJECTION, SOLUTION INTRAVENOUS CONTINUOUS
Status: DISCONTINUED | OUTPATIENT
Start: 2018-06-21 | End: 2018-06-21

## 2018-06-21 RX ORDER — SENNA AND DOCUSATE SODIUM 50; 8.6 MG/1; MG/1
1 TABLET, FILM COATED ORAL DAILY
Status: DISCONTINUED | OUTPATIENT
Start: 2018-06-21 | End: 2018-06-24 | Stop reason: HOSPADM

## 2018-06-21 RX ORDER — MORPHINE SULFATE 2 MG/ML
2 INJECTION, SOLUTION INTRAMUSCULAR; INTRAVENOUS
Status: CANCELLED | OUTPATIENT
Start: 2018-06-21

## 2018-06-21 RX ORDER — SODIUM CHLORIDE 9 MG/ML
INJECTION, SOLUTION INTRAVENOUS CONTINUOUS
Status: DISCONTINUED | OUTPATIENT
Start: 2018-06-21 | End: 2018-06-24 | Stop reason: HOSPADM

## 2018-06-21 RX ORDER — ONDANSETRON 2 MG/ML
4 INJECTION INTRAMUSCULAR; INTRAVENOUS
Status: DISCONTINUED | OUTPATIENT
Start: 2018-06-21 | End: 2018-06-21 | Stop reason: HOSPADM

## 2018-06-21 RX ORDER — KETOROLAC TROMETHAMINE 30 MG/ML
30 INJECTION, SOLUTION INTRAMUSCULAR; INTRAVENOUS EVERY 6 HOURS
Status: COMPLETED | OUTPATIENT
Start: 2018-06-21 | End: 2018-06-21

## 2018-06-21 RX ORDER — ACETAMINOPHEN 325 MG/1
650 TABLET ORAL EVERY 6 HOURS
Status: DISCONTINUED | OUTPATIENT
Start: 2018-06-21 | End: 2018-06-24 | Stop reason: HOSPADM

## 2018-06-21 RX ORDER — PROPOFOL 10 MG/ML
INJECTION, EMULSION INTRAVENOUS CONTINUOUS PRN
Status: DISCONTINUED | OUTPATIENT
Start: 2018-06-21 | End: 2018-06-21 | Stop reason: SDUPTHER

## 2018-06-21 RX ORDER — CHOLECALCIFEROL (VITAMIN D3) 125 MCG
1000 CAPSULE ORAL DAILY
Status: DISCONTINUED | OUTPATIENT
Start: 2018-06-21 | End: 2018-06-24 | Stop reason: HOSPADM

## 2018-06-21 RX ORDER — MIDAZOLAM HYDROCHLORIDE 1 MG/ML
INJECTION INTRAMUSCULAR; INTRAVENOUS PRN
Status: DISCONTINUED | OUTPATIENT
Start: 2018-06-21 | End: 2018-06-21 | Stop reason: SDUPTHER

## 2018-06-21 RX ORDER — LIDOCAINE HYDROCHLORIDE 10 MG/ML
1 INJECTION, SOLUTION EPIDURAL; INFILTRATION; INTRACAUDAL; PERINEURAL
Status: COMPLETED | OUTPATIENT
Start: 2018-06-21 | End: 2018-06-21

## 2018-06-21 RX ORDER — NICOTINE POLACRILEX 4 MG
15 LOZENGE BUCCAL PRN
Status: DISCONTINUED | OUTPATIENT
Start: 2018-06-21 | End: 2018-06-24 | Stop reason: HOSPADM

## 2018-06-21 RX ORDER — SODIUM CHLORIDE 0.9 % (FLUSH) 0.9 %
10 SYRINGE (ML) INJECTION PRN
Status: CANCELLED | OUTPATIENT
Start: 2018-06-21

## 2018-06-21 RX ORDER — MEPERIDINE HYDROCHLORIDE 25 MG/ML
12.5 INJECTION INTRAMUSCULAR; INTRAVENOUS; SUBCUTANEOUS EVERY 5 MIN PRN
Status: DISCONTINUED | OUTPATIENT
Start: 2018-06-21 | End: 2018-06-21 | Stop reason: HOSPADM

## 2018-06-21 RX ORDER — SODIUM CHLORIDE 9 MG/ML
INJECTION, SOLUTION INTRAVENOUS CONTINUOUS
Status: CANCELLED | OUTPATIENT
Start: 2018-06-21

## 2018-06-21 RX ORDER — OXYCODONE HYDROCHLORIDE AND ACETAMINOPHEN 5; 325 MG/1; MG/1
1 TABLET ORAL EVERY 4 HOURS PRN
Qty: 20 TABLET | Refills: 0 | Status: SHIPPED | OUTPATIENT
Start: 2018-06-21 | End: 2018-06-26

## 2018-06-21 RX ORDER — BISACODYL 10 MG
10 SUPPOSITORY, RECTAL RECTAL DAILY PRN
Status: CANCELLED | OUTPATIENT
Start: 2018-06-21

## 2018-06-21 RX ORDER — DOCUSATE SODIUM 100 MG/1
100 CAPSULE, LIQUID FILLED ORAL 2 TIMES DAILY
Status: CANCELLED | OUTPATIENT
Start: 2018-06-21

## 2018-06-21 RX ORDER — NITROFURANTOIN MACROCRYSTALS 50 MG/1
50 CAPSULE ORAL EVERY 12 HOURS SCHEDULED
Status: DISCONTINUED | OUTPATIENT
Start: 2018-06-21 | End: 2018-06-24 | Stop reason: HOSPADM

## 2018-06-21 RX ORDER — SENNA AND DOCUSATE SODIUM 50; 8.6 MG/1; MG/1
1 TABLET, FILM COATED ORAL DAILY
Status: CANCELLED | OUTPATIENT
Start: 2018-06-21

## 2018-06-21 RX ORDER — ONDANSETRON 2 MG/ML
4 INJECTION INTRAMUSCULAR; INTRAVENOUS EVERY 6 HOURS PRN
Status: CANCELLED | OUTPATIENT
Start: 2018-06-21

## 2018-06-21 RX ORDER — OXYCODONE HYDROCHLORIDE 5 MG/1
5 TABLET ORAL EVERY 4 HOURS PRN
Status: CANCELLED | OUTPATIENT
Start: 2018-06-21

## 2018-06-21 RX ORDER — SODIUM CHLORIDE 0.9 % (FLUSH) 0.9 %
10 SYRINGE (ML) INJECTION EVERY 12 HOURS SCHEDULED
Status: CANCELLED | OUTPATIENT
Start: 2018-06-21

## 2018-06-21 RX ORDER — ATORVASTATIN CALCIUM 40 MG/1
40 TABLET, FILM COATED ORAL NIGHTLY
Status: DISCONTINUED | OUTPATIENT
Start: 2018-06-21 | End: 2018-06-24 | Stop reason: HOSPADM

## 2018-06-21 RX ORDER — CARVEDILOL 6.25 MG/1
6.25 TABLET ORAL 2 TIMES DAILY
Status: DISCONTINUED | OUTPATIENT
Start: 2018-06-21 | End: 2018-06-24 | Stop reason: HOSPADM

## 2018-06-21 RX ORDER — SODIUM CHLORIDE, SODIUM LACTATE, POTASSIUM CHLORIDE, CALCIUM CHLORIDE 600; 310; 30; 20 MG/100ML; MG/100ML; MG/100ML; MG/100ML
INJECTION, SOLUTION INTRAVENOUS CONTINUOUS PRN
Status: DISCONTINUED | OUTPATIENT
Start: 2018-06-21 | End: 2018-06-21 | Stop reason: SDUPTHER

## 2018-06-21 RX ORDER — ARIPIPRAZOLE 10 MG/1
10 TABLET ORAL DAILY
Status: DISCONTINUED | OUTPATIENT
Start: 2018-06-21 | End: 2018-06-24 | Stop reason: HOSPADM

## 2018-06-21 RX ORDER — TRAZODONE HYDROCHLORIDE 50 MG/1
50 TABLET ORAL NIGHTLY
Status: DISCONTINUED | OUTPATIENT
Start: 2018-06-21 | End: 2018-06-24 | Stop reason: HOSPADM

## 2018-06-21 RX ORDER — ACETAMINOPHEN 325 MG/1
650 TABLET ORAL EVERY 6 HOURS
Status: CANCELLED | OUTPATIENT
Start: 2018-06-21

## 2018-06-21 RX ORDER — MORPHINE SULFATE 2 MG/ML
2 INJECTION, SOLUTION INTRAMUSCULAR; INTRAVENOUS
Status: ACTIVE | OUTPATIENT
Start: 2018-06-21 | End: 2018-06-22

## 2018-06-21 RX ORDER — BISACODYL 10 MG
10 SUPPOSITORY, RECTAL RECTAL DAILY PRN
Status: DISCONTINUED | OUTPATIENT
Start: 2018-06-21 | End: 2018-06-24 | Stop reason: HOSPADM

## 2018-06-21 RX ORDER — BUPIVACAINE HYDROCHLORIDE 5 MG/ML
INJECTION, SOLUTION EPIDURAL; INTRACAUDAL PRN
Status: DISCONTINUED | OUTPATIENT
Start: 2018-06-21 | End: 2018-06-21 | Stop reason: SDUPTHER

## 2018-06-21 RX ORDER — ROPIVACAINE HYDROCHLORIDE 5 MG/ML
INJECTION, SOLUTION EPIDURAL; INFILTRATION; PERINEURAL PRN
Status: DISCONTINUED | OUTPATIENT
Start: 2018-06-21 | End: 2018-06-21 | Stop reason: SDUPTHER

## 2018-06-21 RX ORDER — DEXTROSE MONOHYDRATE 25 G/50ML
12.5 INJECTION, SOLUTION INTRAVENOUS PRN
Status: DISCONTINUED | OUTPATIENT
Start: 2018-06-21 | End: 2018-06-24 | Stop reason: HOSPADM

## 2018-06-21 RX ORDER — ASPIRIN 81 MG/1
81 TABLET ORAL 2 TIMES DAILY
Status: DISCONTINUED | OUTPATIENT
Start: 2018-06-22 | End: 2018-06-24 | Stop reason: HOSPADM

## 2018-06-21 RX ORDER — SODIUM CHLORIDE 0.9 % (FLUSH) 0.9 %
10 SYRINGE (ML) INJECTION PRN
Status: DISCONTINUED | OUTPATIENT
Start: 2018-06-21 | End: 2018-06-21 | Stop reason: HOSPADM

## 2018-06-21 RX ORDER — MORPHINE SULFATE 4 MG/ML
4 INJECTION, SOLUTION INTRAMUSCULAR; INTRAVENOUS
Status: ACTIVE | OUTPATIENT
Start: 2018-06-21 | End: 2018-06-22

## 2018-06-21 RX ORDER — ONDANSETRON 2 MG/ML
4 INJECTION INTRAMUSCULAR; INTRAVENOUS EVERY 6 HOURS PRN
Status: DISCONTINUED | OUTPATIENT
Start: 2018-06-21 | End: 2018-06-24 | Stop reason: HOSPADM

## 2018-06-21 RX ORDER — LIDOCAINE HYDROCHLORIDE 10 MG/ML
INJECTION, SOLUTION INFILTRATION; PERINEURAL PRN
Status: DISCONTINUED | OUTPATIENT
Start: 2018-06-21 | End: 2018-06-21 | Stop reason: SDUPTHER

## 2018-06-21 RX ORDER — DEXTROSE MONOHYDRATE 50 MG/ML
100 INJECTION, SOLUTION INTRAVENOUS PRN
Status: DISCONTINUED | OUTPATIENT
Start: 2018-06-21 | End: 2018-06-24 | Stop reason: HOSPADM

## 2018-06-21 RX ORDER — MAGNESIUM HYDROXIDE 1200 MG/15ML
LIQUID ORAL CONTINUOUS PRN
Status: COMPLETED | OUTPATIENT
Start: 2018-06-21 | End: 2018-06-21

## 2018-06-21 RX ADMIN — DOCUSATE SODIUM 100 MG: 100 CAPSULE, LIQUID FILLED ORAL at 14:29

## 2018-06-21 RX ADMIN — TRAZODONE HYDROCHLORIDE 50 MG: 50 TABLET ORAL at 21:00

## 2018-06-21 RX ADMIN — NITROFURANTOIN MACROCRYSTALS 50 MG: 50 CAPSULE ORAL at 21:42

## 2018-06-21 RX ADMIN — LIDOCAINE HYDROCHLORIDE 0.1 ML: 10 INJECTION, SOLUTION EPIDURAL; INFILTRATION; INTRACAUDAL; PERINEURAL at 09:48

## 2018-06-21 RX ADMIN — SODIUM CHLORIDE, POTASSIUM CHLORIDE, SODIUM LACTATE AND CALCIUM CHLORIDE 125 ML/HR: 600; 310; 30; 20 INJECTION, SOLUTION INTRAVENOUS at 09:49

## 2018-06-21 RX ADMIN — ATORVASTATIN CALCIUM 40 MG: 40 TABLET, FILM COATED ORAL at 21:00

## 2018-06-21 RX ADMIN — KETOROLAC TROMETHAMINE 30 MG: 30 INJECTION, SOLUTION INTRAMUSCULAR at 20:59

## 2018-06-21 RX ADMIN — KETOROLAC TROMETHAMINE 30 MG: 30 INJECTION, SOLUTION INTRAMUSCULAR at 14:29

## 2018-06-21 RX ADMIN — ARIPIPRAZOLE 10 MG: 10 TABLET ORAL at 21:42

## 2018-06-21 RX ADMIN — INSULIN LISPRO 6 UNITS: 100 INJECTION, SOLUTION INTRAVENOUS; SUBCUTANEOUS at 21:42

## 2018-06-21 RX ADMIN — ROPIVACAINE HYDROCHLORIDE 15 ML: 5 INJECTION, SOLUTION EPIDURAL; INFILTRATION; PERINEURAL at 10:15

## 2018-06-21 RX ADMIN — SODIUM CHLORIDE, POTASSIUM CHLORIDE, SODIUM LACTATE AND CALCIUM CHLORIDE: 600; 310; 30; 20 INJECTION, SOLUTION INTRAVENOUS at 11:50

## 2018-06-21 RX ADMIN — CARVEDILOL 6.25 MG: 6.25 TABLET, FILM COATED ORAL at 21:00

## 2018-06-21 RX ADMIN — INSULIN GLARGINE 28 UNITS: 100 INJECTION, SOLUTION SUBCUTANEOUS at 21:42

## 2018-06-21 RX ADMIN — LIDOCAINE HYDROCHLORIDE 50 MG: 10 INJECTION, SOLUTION INFILTRATION; PERINEURAL at 10:50

## 2018-06-21 RX ADMIN — ACETAMINOPHEN 650 MG: 325 TABLET ORAL at 14:29

## 2018-06-21 RX ADMIN — SODIUM CHLORIDE, POTASSIUM CHLORIDE, SODIUM LACTATE AND CALCIUM CHLORIDE: 600; 310; 30; 20 INJECTION, SOLUTION INTRAVENOUS at 09:35

## 2018-06-21 RX ADMIN — CEFAZOLIN SODIUM 2 G: 1 INJECTION, SOLUTION INTRAVENOUS at 10:36

## 2018-06-21 RX ADMIN — OXYCODONE HYDROCHLORIDE 5 MG: 5 TABLET ORAL at 17:02

## 2018-06-21 RX ADMIN — DEXAMETHASONE SODIUM PHOSPHATE 5 MG: 10 INJECTION INTRAMUSCULAR; INTRAVENOUS at 10:15

## 2018-06-21 RX ADMIN — BUPIVACAINE HYDROCHLORIDE 2 ML: 5 INJECTION, SOLUTION EPIDURAL; INTRACAUDAL; PERINEURAL at 10:49

## 2018-06-21 RX ADMIN — PROPOFOL 50 MCG/KG/MIN: 10 INJECTION, EMULSION INTRAVENOUS at 10:50

## 2018-06-21 RX ADMIN — DOCUSATE SODIUM 100 MG: 100 CAPSULE, LIQUID FILLED ORAL at 21:00

## 2018-06-21 RX ADMIN — SERTRALINE HYDROCHLORIDE 50 MG: 50 TABLET ORAL at 21:00

## 2018-06-21 RX ADMIN — SENNOSIDES AND DOCUSATE SODIUM 1 TABLET: 8.6; 5 TABLET ORAL at 14:29

## 2018-06-21 RX ADMIN — ACETAMINOPHEN 650 MG: 325 TABLET ORAL at 20:59

## 2018-06-21 RX ADMIN — DEXAMETHASONE SODIUM PHOSPHATE 4 MG: 10 INJECTION INTRAMUSCULAR; INTRAVENOUS at 11:00

## 2018-06-21 RX ADMIN — Medication 2 G: at 18:44

## 2018-06-21 RX ADMIN — CYANOCOBALAMIN TAB 500 MCG 1000 MCG: 500 TAB at 20:59

## 2018-06-21 RX ADMIN — MIDAZOLAM HYDROCHLORIDE 4 MG: 1 INJECTION, SOLUTION INTRAMUSCULAR; INTRAVENOUS at 10:15

## 2018-06-21 RX ADMIN — SODIUM CHLORIDE: 9 INJECTION, SOLUTION INTRAVENOUS at 14:29

## 2018-06-21 RX ADMIN — Medication 10 ML: at 21:00

## 2018-06-21 RX ADMIN — SODIUM CHLORIDE, POTASSIUM CHLORIDE, SODIUM LACTATE AND CALCIUM CHLORIDE: 600; 310; 30; 20 INJECTION, SOLUTION INTRAVENOUS at 11:15

## 2018-06-21 RX ADMIN — VITAMIN D, TAB 1000IU (100/BT) 1000 UNITS: 25 TAB at 21:00

## 2018-06-21 RX ADMIN — ONDANSETRON 4 MG: 2 INJECTION INTRAMUSCULAR; INTRAVENOUS at 11:00

## 2018-06-21 RX ADMIN — MICONAZOLE NITRATE: 20 POWDER TOPICAL at 21:00

## 2018-06-21 RX ADMIN — ONDANSETRON 4 MG: 2 INJECTION INTRAMUSCULAR; INTRAVENOUS at 14:29

## 2018-06-21 ASSESSMENT — ENCOUNTER SYMPTOMS: SHORTNESS OF BREATH: 1

## 2018-06-21 ASSESSMENT — PULMONARY FUNCTION TESTS
PIF_VALUE: 16
PIF_VALUE: 0
PIF_VALUE: 16
PIF_VALUE: 1
PIF_VALUE: 1
PIF_VALUE: 16
PIF_VALUE: 0
PIF_VALUE: 16
PIF_VALUE: 0
PIF_VALUE: 16
PIF_VALUE: 0
PIF_VALUE: 16
PIF_VALUE: 0
PIF_VALUE: 17
PIF_VALUE: 16
PIF_VALUE: 16
PIF_VALUE: 1
PIF_VALUE: 16
PIF_VALUE: 16
PIF_VALUE: 0
PIF_VALUE: 0
PIF_VALUE: 16
PIF_VALUE: 0
PIF_VALUE: 16
PIF_VALUE: 0
PIF_VALUE: 16
PIF_VALUE: 0
PIF_VALUE: 16
PIF_VALUE: 16
PIF_VALUE: 0
PIF_VALUE: 16
PIF_VALUE: 0
PIF_VALUE: 0
PIF_VALUE: 16
PIF_VALUE: 16
PIF_VALUE: 0
PIF_VALUE: 0
PIF_VALUE: 1
PIF_VALUE: 16
PIF_VALUE: 0
PIF_VALUE: 16
PIF_VALUE: 1
PIF_VALUE: 16
PIF_VALUE: 16
PIF_VALUE: 1
PIF_VALUE: 16
PIF_VALUE: 1
PIF_VALUE: 16
PIF_VALUE: 16
PIF_VALUE: 0
PIF_VALUE: 16
PIF_VALUE: 0

## 2018-06-21 ASSESSMENT — PAIN SCALES - GENERAL
PAINLEVEL_OUTOF10: 1
PAINLEVEL_OUTOF10: 3
PAINLEVEL_OUTOF10: 0
PAINLEVEL_OUTOF10: 7
PAINLEVEL_OUTOF10: 0

## 2018-06-21 ASSESSMENT — PAIN DESCRIPTION - FREQUENCY: FREQUENCY: INTERMITTENT

## 2018-06-21 ASSESSMENT — PAIN DESCRIPTION - ORIENTATION: ORIENTATION: LEFT

## 2018-06-21 ASSESSMENT — PAIN DESCRIPTION - DESCRIPTORS
DESCRIPTORS: ACHING
DESCRIPTORS: ACHING

## 2018-06-21 ASSESSMENT — PAIN DESCRIPTION - PAIN TYPE: TYPE: SURGICAL PAIN

## 2018-06-21 ASSESSMENT — PAIN - FUNCTIONAL ASSESSMENT: PAIN_FUNCTIONAL_ASSESSMENT: 0-10

## 2018-06-21 ASSESSMENT — PAIN DESCRIPTION - LOCATION: LOCATION: KNEE

## 2018-06-22 LAB
ANION GAP SERPL CALCULATED.3IONS-SCNC: 17 MEQ/L (ref 7–13)
BUN BLDV-MCNC: 39 MG/DL (ref 8–23)
CALCIUM SERPL-MCNC: 9.6 MG/DL (ref 8.6–10.2)
CHLORIDE BLD-SCNC: 94 MEQ/L (ref 98–107)
CO2: 22 MEQ/L (ref 22–29)
CREAT SERPL-MCNC: 1.82 MG/DL (ref 0.5–0.9)
GFR AFRICAN AMERICAN: 34.2
GFR NON-AFRICAN AMERICAN: 28.3
GLUCOSE BLD-MCNC: 110 MG/DL (ref 60–115)
GLUCOSE BLD-MCNC: 172 MG/DL (ref 60–115)
GLUCOSE BLD-MCNC: 337 MG/DL (ref 60–115)
GLUCOSE BLD-MCNC: 411 MG/DL (ref 74–109)
GLUCOSE BLD-MCNC: 432 MG/DL (ref 60–115)
GLUCOSE BLD-MCNC: 463 MG/DL (ref 60–115)
GLUCOSE BLD-MCNC: 502 MG/DL (ref 60–115)
GLUCOSE BLD-MCNC: 534 MG/DL (ref 60–115)
HCT VFR BLD CALC: 33.3 % (ref 37–47)
HEMOGLOBIN: 11.5 G/DL (ref 12–16)
MCH RBC QN AUTO: 29.2 PG (ref 27–31.3)
MCHC RBC AUTO-ENTMCNC: 34.7 % (ref 33–37)
MCV RBC AUTO: 84.1 FL (ref 82–100)
PDW BLD-RTO: 13.4 % (ref 11.5–14.5)
PERFORMED ON: ABNORMAL
PERFORMED ON: NORMAL
PLATELET # BLD: 248 K/UL (ref 130–400)
POTASSIUM REFLEX MAGNESIUM: 4.9 MEQ/L (ref 3.5–5.1)
RBC # BLD: 3.96 M/UL (ref 4.2–5.4)
SODIUM BLD-SCNC: 133 MEQ/L (ref 132–144)
WBC # BLD: 18.3 K/UL (ref 4.8–10.8)

## 2018-06-22 PROCEDURE — 6360000002 HC RX W HCPCS: Performed by: INTERNAL MEDICINE

## 2018-06-22 PROCEDURE — 80048 BASIC METABOLIC PNL TOTAL CA: CPT

## 2018-06-22 PROCEDURE — 97116 GAIT TRAINING THERAPY: CPT

## 2018-06-22 PROCEDURE — 2580000003 HC RX 258: Performed by: ORTHOPAEDIC SURGERY

## 2018-06-22 PROCEDURE — 36415 COLL VENOUS BLD VENIPUNCTURE: CPT

## 2018-06-22 PROCEDURE — 97535 SELF CARE MNGMENT TRAINING: CPT

## 2018-06-22 PROCEDURE — 1210000000 HC MED SURG R&B

## 2018-06-22 PROCEDURE — 6370000000 HC RX 637 (ALT 250 FOR IP): Performed by: PHYSICIAN ASSISTANT

## 2018-06-22 PROCEDURE — 6360000002 HC RX W HCPCS: Performed by: ORTHOPAEDIC SURGERY

## 2018-06-22 PROCEDURE — 6370000000 HC RX 637 (ALT 250 FOR IP): Performed by: ORTHOPAEDIC SURGERY

## 2018-06-22 PROCEDURE — 85027 COMPLETE CBC AUTOMATED: CPT

## 2018-06-22 RX ORDER — HYDRALAZINE HYDROCHLORIDE 20 MG/ML
10 INJECTION INTRAMUSCULAR; INTRAVENOUS ONCE
Status: COMPLETED | OUTPATIENT
Start: 2018-06-22 | End: 2018-06-22

## 2018-06-22 RX ADMIN — ASPIRIN 81 MG: 81 TABLET, COATED ORAL at 09:23

## 2018-06-22 RX ADMIN — ACETAMINOPHEN 650 MG: 325 TABLET ORAL at 09:23

## 2018-06-22 RX ADMIN — ATORVASTATIN CALCIUM 40 MG: 40 TABLET, FILM COATED ORAL at 20:57

## 2018-06-22 RX ADMIN — NITROFURANTOIN MACROCRYSTALS 50 MG: 50 CAPSULE ORAL at 09:23

## 2018-06-22 RX ADMIN — CARVEDILOL 6.25 MG: 6.25 TABLET, FILM COATED ORAL at 20:58

## 2018-06-22 RX ADMIN — ACETAMINOPHEN 650 MG: 325 TABLET ORAL at 22:28

## 2018-06-22 RX ADMIN — CYANOCOBALAMIN TAB 500 MCG 1000 MCG: 500 TAB at 20:58

## 2018-06-22 RX ADMIN — NITROFURANTOIN MACROCRYSTALS 50 MG: 50 CAPSULE ORAL at 20:57

## 2018-06-22 RX ADMIN — SERTRALINE HYDROCHLORIDE 50 MG: 50 TABLET ORAL at 20:59

## 2018-06-22 RX ADMIN — DOCUSATE SODIUM 100 MG: 100 CAPSULE, LIQUID FILLED ORAL at 09:23

## 2018-06-22 RX ADMIN — ONDANSETRON 4 MG: 2 INJECTION INTRAMUSCULAR; INTRAVENOUS at 20:11

## 2018-06-22 RX ADMIN — MICONAZOLE NITRATE: 20 POWDER TOPICAL at 11:39

## 2018-06-22 RX ADMIN — ASPIRIN 81 MG: 81 TABLET, COATED ORAL at 20:58

## 2018-06-22 RX ADMIN — ACETAMINOPHEN 650 MG: 325 TABLET ORAL at 01:40

## 2018-06-22 RX ADMIN — INSULIN GLARGINE 28 UNITS: 100 INJECTION, SOLUTION SUBCUTANEOUS at 11:34

## 2018-06-22 RX ADMIN — ACETAMINOPHEN 650 MG: 325 TABLET ORAL at 15:52

## 2018-06-22 RX ADMIN — MICONAZOLE NITRATE: 20 POWDER TOPICAL at 22:28

## 2018-06-22 RX ADMIN — OXYCODONE HYDROCHLORIDE 5 MG: 5 TABLET ORAL at 09:23

## 2018-06-22 RX ADMIN — Medication 10 ML: at 21:00

## 2018-06-22 RX ADMIN — PANTOPRAZOLE SODIUM 40 MG: 40 TABLET, DELAYED RELEASE ORAL at 15:52

## 2018-06-22 RX ADMIN — INSULIN GLARGINE 28 UNITS: 100 INJECTION, SOLUTION SUBCUTANEOUS at 21:00

## 2018-06-22 RX ADMIN — Medication 2 G: at 01:39

## 2018-06-22 RX ADMIN — Medication 10 ML: at 09:31

## 2018-06-22 RX ADMIN — ONDANSETRON 4 MG: 2 INJECTION INTRAMUSCULAR; INTRAVENOUS at 04:37

## 2018-06-22 RX ADMIN — CARVEDILOL 6.25 MG: 6.25 TABLET, FILM COATED ORAL at 09:23

## 2018-06-22 RX ADMIN — VITAMIN D, TAB 1000IU (100/BT) 1000 UNITS: 25 TAB at 20:57

## 2018-06-22 RX ADMIN — HYDRALAZINE HYDROCHLORIDE 10 MG: 20 INJECTION INTRAMUSCULAR; INTRAVENOUS at 06:16

## 2018-06-22 RX ADMIN — ARIPIPRAZOLE 10 MG: 10 TABLET ORAL at 20:57

## 2018-06-22 RX ADMIN — SENNOSIDES AND DOCUSATE SODIUM 1 TABLET: 8.6; 5 TABLET ORAL at 09:21

## 2018-06-22 RX ADMIN — PANTOPRAZOLE SODIUM 40 MG: 40 TABLET, DELAYED RELEASE ORAL at 06:16

## 2018-06-22 ASSESSMENT — PAIN SCALES - GENERAL
PAINLEVEL_OUTOF10: 5
PAINLEVEL_OUTOF10: 1
PAINLEVEL_OUTOF10: 6
PAINLEVEL_OUTOF10: 5
PAINLEVEL_OUTOF10: 5
PAINLEVEL_OUTOF10: 3

## 2018-06-22 ASSESSMENT — PAIN DESCRIPTION - LOCATION
LOCATION: KNEE
LOCATION: KNEE

## 2018-06-22 ASSESSMENT — PAIN DESCRIPTION - PAIN TYPE
TYPE: ACUTE PAIN;SURGICAL PAIN
TYPE: ACUTE PAIN;SURGICAL PAIN

## 2018-06-22 ASSESSMENT — PAIN DESCRIPTION - DESCRIPTORS
DESCRIPTORS: ACHING
DESCRIPTORS: ACHING

## 2018-06-22 ASSESSMENT — PAIN DESCRIPTION - ORIENTATION
ORIENTATION: LEFT
ORIENTATION: LEFT

## 2018-06-23 LAB
ANION GAP SERPL CALCULATED.3IONS-SCNC: 15 MEQ/L (ref 7–13)
BUN BLDV-MCNC: 51 MG/DL (ref 8–23)
CALCIUM SERPL-MCNC: 9 MG/DL (ref 8.6–10.2)
CHLORIDE BLD-SCNC: 93 MEQ/L (ref 98–107)
CO2: 22 MEQ/L (ref 22–29)
CREAT SERPL-MCNC: 1.92 MG/DL (ref 0.5–0.9)
GFR AFRICAN AMERICAN: 32.2
GFR NON-AFRICAN AMERICAN: 26.6
GLUCOSE BLD-MCNC: 104 MG/DL (ref 60–115)
GLUCOSE BLD-MCNC: 131 MG/DL (ref 60–115)
GLUCOSE BLD-MCNC: 163 MG/DL (ref 60–115)
GLUCOSE BLD-MCNC: 167 MG/DL (ref 74–109)
GLUCOSE BLD-MCNC: 177 MG/DL (ref 60–115)
GLUCOSE BLD-MCNC: 208 MG/DL (ref 60–115)
GLUCOSE BLD-MCNC: 212 MG/DL (ref 60–115)
GLUCOSE BLD-MCNC: 72 MG/DL (ref 60–115)
HCT VFR BLD CALC: 27.8 % (ref 37–47)
HEMOGLOBIN: 9.7 G/DL (ref 12–16)
MCH RBC QN AUTO: 29.4 PG (ref 27–31.3)
MCHC RBC AUTO-ENTMCNC: 35 % (ref 33–37)
MCV RBC AUTO: 83.9 FL (ref 82–100)
PDW BLD-RTO: 14 % (ref 11.5–14.5)
PERFORMED ON: ABNORMAL
PERFORMED ON: NORMAL
PERFORMED ON: NORMAL
PLATELET # BLD: 194 K/UL (ref 130–400)
POTASSIUM SERPL-SCNC: 4.2 MEQ/L (ref 3.5–5.1)
RBC # BLD: 3.31 M/UL (ref 4.2–5.4)
SODIUM BLD-SCNC: 130 MEQ/L (ref 132–144)
WBC # BLD: 14.1 K/UL (ref 4.8–10.8)

## 2018-06-23 PROCEDURE — S0028 INJECTION, FAMOTIDINE, 20 MG: HCPCS | Performed by: INTERNAL MEDICINE

## 2018-06-23 PROCEDURE — 1210000000 HC MED SURG R&B

## 2018-06-23 PROCEDURE — 80048 BASIC METABOLIC PNL TOTAL CA: CPT

## 2018-06-23 PROCEDURE — 6370000000 HC RX 637 (ALT 250 FOR IP): Performed by: ORTHOPAEDIC SURGERY

## 2018-06-23 PROCEDURE — 36415 COLL VENOUS BLD VENIPUNCTURE: CPT

## 2018-06-23 PROCEDURE — 97110 THERAPEUTIC EXERCISES: CPT

## 2018-06-23 PROCEDURE — 6360000002 HC RX W HCPCS: Performed by: ORTHOPAEDIC SURGERY

## 2018-06-23 PROCEDURE — 97116 GAIT TRAINING THERAPY: CPT

## 2018-06-23 PROCEDURE — 6370000000 HC RX 637 (ALT 250 FOR IP): Performed by: PHYSICIAN ASSISTANT

## 2018-06-23 PROCEDURE — 2500000003 HC RX 250 WO HCPCS: Performed by: INTERNAL MEDICINE

## 2018-06-23 PROCEDURE — 85027 COMPLETE CBC AUTOMATED: CPT

## 2018-06-23 PROCEDURE — 2580000003 HC RX 258: Performed by: ORTHOPAEDIC SURGERY

## 2018-06-23 PROCEDURE — 6360000002 HC RX W HCPCS: Performed by: INTERNAL MEDICINE

## 2018-06-23 RX ORDER — CLOPIDOGREL BISULFATE 75 MG/1
75 TABLET ORAL DAILY
COMMUNITY
End: 2018-11-28 | Stop reason: SDUPTHER

## 2018-06-23 RX ORDER — CLOPIDOGREL BISULFATE 75 MG/1
75 TABLET ORAL DAILY
Status: DISCONTINUED | OUTPATIENT
Start: 2018-06-23 | End: 2018-06-24 | Stop reason: HOSPADM

## 2018-06-23 RX ORDER — DIPHENHYDRAMINE HYDROCHLORIDE 50 MG/ML
25 INJECTION INTRAMUSCULAR; INTRAVENOUS ONCE
Status: COMPLETED | OUTPATIENT
Start: 2018-06-23 | End: 2018-06-23

## 2018-06-23 RX ADMIN — CARVEDILOL 6.25 MG: 6.25 TABLET, FILM COATED ORAL at 08:13

## 2018-06-23 RX ADMIN — INSULIN GLARGINE 28 UNITS: 100 INJECTION, SOLUTION SUBCUTANEOUS at 21:33

## 2018-06-23 RX ADMIN — PANTOPRAZOLE SODIUM 40 MG: 40 TABLET, DELAYED RELEASE ORAL at 16:29

## 2018-06-23 RX ADMIN — INSULIN GLARGINE 28 UNITS: 100 INJECTION, SOLUTION SUBCUTANEOUS at 08:15

## 2018-06-23 RX ADMIN — NITROFURANTOIN MACROCRYSTALS 50 MG: 50 CAPSULE ORAL at 21:25

## 2018-06-23 RX ADMIN — ACETAMINOPHEN 650 MG: 325 TABLET ORAL at 17:22

## 2018-06-23 RX ADMIN — CLOPIDOGREL BISULFATE 75 MG: 75 TABLET ORAL at 16:29

## 2018-06-23 RX ADMIN — ASPIRIN 81 MG: 81 TABLET, COATED ORAL at 08:13

## 2018-06-23 RX ADMIN — DIPHENHYDRAMINE HYDROCHLORIDE 25 MG: 50 INJECTION, SOLUTION INTRAMUSCULAR; INTRAVENOUS at 04:06

## 2018-06-23 RX ADMIN — Medication 10 ML: at 21:29

## 2018-06-23 RX ADMIN — PANTOPRAZOLE SODIUM 40 MG: 40 TABLET, DELAYED RELEASE ORAL at 06:33

## 2018-06-23 RX ADMIN — ACETAMINOPHEN 650 MG: 325 TABLET ORAL at 11:24

## 2018-06-23 RX ADMIN — VITAMIN D, TAB 1000IU (100/BT) 1000 UNITS: 25 TAB at 21:26

## 2018-06-23 RX ADMIN — ARIPIPRAZOLE 10 MG: 10 TABLET ORAL at 21:25

## 2018-06-23 RX ADMIN — CYANOCOBALAMIN TAB 500 MCG 1000 MCG: 500 TAB at 21:25

## 2018-06-23 RX ADMIN — TRAZODONE HYDROCHLORIDE 50 MG: 50 TABLET ORAL at 21:25

## 2018-06-23 RX ADMIN — FAMOTIDINE 20 MG: 10 INJECTION, SOLUTION INTRAVENOUS at 04:06

## 2018-06-23 RX ADMIN — ONDANSETRON 4 MG: 2 INJECTION INTRAMUSCULAR; INTRAVENOUS at 12:41

## 2018-06-23 RX ADMIN — Medication 10 ML: at 08:13

## 2018-06-23 RX ADMIN — ACETAMINOPHEN 650 MG: 325 TABLET ORAL at 23:28

## 2018-06-23 RX ADMIN — SERTRALINE HYDROCHLORIDE 50 MG: 50 TABLET ORAL at 21:25

## 2018-06-23 RX ADMIN — ACETAMINOPHEN 650 MG: 325 TABLET ORAL at 04:05

## 2018-06-23 RX ADMIN — CARVEDILOL 6.25 MG: 6.25 TABLET, FILM COATED ORAL at 21:25

## 2018-06-23 RX ADMIN — NITROFURANTOIN MACROCRYSTALS 50 MG: 50 CAPSULE ORAL at 08:13

## 2018-06-23 RX ADMIN — MICONAZOLE NITRATE: 20 POWDER TOPICAL at 21:29

## 2018-06-23 RX ADMIN — ATORVASTATIN CALCIUM 40 MG: 40 TABLET, FILM COATED ORAL at 21:25

## 2018-06-23 RX ADMIN — ASPIRIN 81 MG: 81 TABLET, COATED ORAL at 21:25

## 2018-06-23 ASSESSMENT — PAIN SCALES - GENERAL
PAINLEVEL_OUTOF10: 2
PAINLEVEL_OUTOF10: 3
PAINLEVEL_OUTOF10: 3
PAINLEVEL_OUTOF10: 5
PAINLEVEL_OUTOF10: 4
PAINLEVEL_OUTOF10: 5
PAINLEVEL_OUTOF10: 5
PAINLEVEL_OUTOF10: 0

## 2018-06-23 ASSESSMENT — PAIN DESCRIPTION - LOCATION
LOCATION: KNEE

## 2018-06-23 ASSESSMENT — PAIN DESCRIPTION - ORIENTATION
ORIENTATION: LEFT

## 2018-06-23 ASSESSMENT — PAIN DESCRIPTION - DESCRIPTORS
DESCRIPTORS: ACHING

## 2018-06-23 ASSESSMENT — PAIN DESCRIPTION - PAIN TYPE
TYPE: ACUTE PAIN;SURGICAL PAIN
TYPE: SURGICAL PAIN
TYPE: ACUTE PAIN;SURGICAL PAIN

## 2018-06-23 ASSESSMENT — PAIN DESCRIPTION - FREQUENCY: FREQUENCY: INTERMITTENT

## 2018-06-24 VITALS
RESPIRATION RATE: 18 BRPM | SYSTOLIC BLOOD PRESSURE: 145 MMHG | HEIGHT: 66 IN | DIASTOLIC BLOOD PRESSURE: 63 MMHG | WEIGHT: 238 LBS | BODY MASS INDEX: 38.25 KG/M2 | TEMPERATURE: 98.1 F | OXYGEN SATURATION: 97 % | HEART RATE: 81 BPM

## 2018-06-24 LAB
ANION GAP SERPL CALCULATED.3IONS-SCNC: 15 MEQ/L (ref 7–13)
BUN BLDV-MCNC: 42 MG/DL (ref 8–23)
CALCIUM SERPL-MCNC: 9.3 MG/DL (ref 8.6–10.2)
CHLORIDE BLD-SCNC: 103 MEQ/L (ref 98–107)
CO2: 21 MEQ/L (ref 22–29)
CREAT SERPL-MCNC: 1.73 MG/DL (ref 0.5–0.9)
GFR AFRICAN AMERICAN: 36.3
GFR NON-AFRICAN AMERICAN: 30
GLUCOSE BLD-MCNC: 122 MG/DL (ref 74–109)
GLUCOSE BLD-MCNC: 134 MG/DL (ref 60–115)
GLUCOSE BLD-MCNC: 187 MG/DL (ref 60–115)
HCT VFR BLD CALC: 30.1 % (ref 37–47)
HEMOGLOBIN: 10.4 G/DL (ref 12–16)
MCH RBC QN AUTO: 29.3 PG (ref 27–31.3)
MCHC RBC AUTO-ENTMCNC: 34.6 % (ref 33–37)
MCV RBC AUTO: 84.7 FL (ref 82–100)
PDW BLD-RTO: 14 % (ref 11.5–14.5)
PERFORMED ON: ABNORMAL
PERFORMED ON: ABNORMAL
PLATELET # BLD: 202 K/UL (ref 130–400)
POTASSIUM SERPL-SCNC: 4.4 MEQ/L (ref 3.5–5.1)
RBC # BLD: 3.56 M/UL (ref 4.2–5.4)
SODIUM BLD-SCNC: 139 MEQ/L (ref 132–144)
WBC # BLD: 12.3 K/UL (ref 4.8–10.8)

## 2018-06-24 PROCEDURE — 6370000000 HC RX 637 (ALT 250 FOR IP): Performed by: ORTHOPAEDIC SURGERY

## 2018-06-24 PROCEDURE — 97116 GAIT TRAINING THERAPY: CPT

## 2018-06-24 PROCEDURE — 80048 BASIC METABOLIC PNL TOTAL CA: CPT

## 2018-06-24 PROCEDURE — 85027 COMPLETE CBC AUTOMATED: CPT

## 2018-06-24 PROCEDURE — 2580000003 HC RX 258: Performed by: ORTHOPAEDIC SURGERY

## 2018-06-24 PROCEDURE — 6370000000 HC RX 637 (ALT 250 FOR IP): Performed by: PHYSICIAN ASSISTANT

## 2018-06-24 PROCEDURE — 36415 COLL VENOUS BLD VENIPUNCTURE: CPT

## 2018-06-24 RX ADMIN — ACETAMINOPHEN 650 MG: 325 TABLET ORAL at 11:50

## 2018-06-24 RX ADMIN — ASPIRIN 81 MG: 81 TABLET, COATED ORAL at 09:02

## 2018-06-24 RX ADMIN — ACETAMINOPHEN 650 MG: 325 TABLET ORAL at 06:01

## 2018-06-24 RX ADMIN — NITROFURANTOIN MACROCRYSTALS 50 MG: 50 CAPSULE ORAL at 09:02

## 2018-06-24 RX ADMIN — PANTOPRAZOLE SODIUM 40 MG: 40 TABLET, DELAYED RELEASE ORAL at 06:01

## 2018-06-24 RX ADMIN — CYANOCOBALAMIN TAB 500 MCG 1000 MCG: 500 TAB at 09:02

## 2018-06-24 RX ADMIN — Medication 10 ML: at 09:02

## 2018-06-24 RX ADMIN — CARVEDILOL 6.25 MG: 6.25 TABLET, FILM COATED ORAL at 09:02

## 2018-06-24 RX ADMIN — INSULIN GLARGINE 28 UNITS: 100 INJECTION, SOLUTION SUBCUTANEOUS at 09:41

## 2018-06-24 RX ADMIN — SERTRALINE HYDROCHLORIDE 50 MG: 50 TABLET ORAL at 09:02

## 2018-06-24 RX ADMIN — VITAMIN D, TAB 1000IU (100/BT) 1000 UNITS: 25 TAB at 09:02

## 2018-06-24 RX ADMIN — CLOPIDOGREL BISULFATE 75 MG: 75 TABLET ORAL at 09:02

## 2018-06-24 ASSESSMENT — PAIN SCALES - GENERAL
PAINLEVEL_OUTOF10: 3
PAINLEVEL_OUTOF10: 3
PAINLEVEL_OUTOF10: 0
PAINLEVEL_OUTOF10: 3
PAINLEVEL_OUTOF10: 3

## 2018-06-24 ASSESSMENT — PAIN DESCRIPTION - ORIENTATION
ORIENTATION: LEFT
ORIENTATION: LEFT

## 2018-06-24 ASSESSMENT — PAIN DESCRIPTION - DESCRIPTORS
DESCRIPTORS: ACHING
DESCRIPTORS: ACHING

## 2018-06-24 ASSESSMENT — PAIN DESCRIPTION - PAIN TYPE: TYPE: ACUTE PAIN;SURGICAL PAIN

## 2018-06-24 ASSESSMENT — PAIN DESCRIPTION - LOCATION
LOCATION: KNEE
LOCATION: KNEE

## 2018-06-25 ENCOUNTER — CARE COORDINATION (OUTPATIENT)
Dept: CASE MANAGEMENT | Age: 58
End: 2018-06-25

## 2018-06-25 ENCOUNTER — OFFICE VISIT (OUTPATIENT)
Dept: GERIATRIC MEDICINE | Age: 58
End: 2018-06-25
Payer: MEDICARE

## 2018-06-25 DIAGNOSIS — G40.909 SEIZURE DISORDER (HCC): ICD-10-CM

## 2018-06-25 DIAGNOSIS — I10 ESSENTIAL HYPERTENSION: ICD-10-CM

## 2018-06-25 DIAGNOSIS — R53.1 WEAKNESS: ICD-10-CM

## 2018-06-25 DIAGNOSIS — E11.9 TYPE 2 DIABETES MELLITUS WITHOUT COMPLICATION, UNSPECIFIED LONG TERM INSULIN USE STATUS: ICD-10-CM

## 2018-06-25 DIAGNOSIS — M25.562 ACUTE PAIN OF LEFT KNEE: Primary | ICD-10-CM

## 2018-06-25 PROCEDURE — 3017F COLORECTAL CA SCREEN DOC REV: CPT | Performed by: INTERNAL MEDICINE

## 2018-06-25 PROCEDURE — 3045F PR MOST RECENT HEMOGLOBIN A1C LEVEL 7.0-9.0%: CPT | Performed by: INTERNAL MEDICINE

## 2018-06-25 PROCEDURE — 99305 1ST NF CARE MODERATE MDM 35: CPT | Performed by: INTERNAL MEDICINE

## 2018-06-27 RX ORDER — ACETAMINOPHEN 500 MG
TABLET ORAL
Qty: 120 TABLET | Refills: 3 | Status: SHIPPED | OUTPATIENT
Start: 2018-06-27 | End: 2018-11-14 | Stop reason: SDUPTHER

## 2018-06-28 LAB
ANION GAP SERPL CALCULATED.3IONS-SCNC: 14 MEQ/L (ref 7–13)
BUN BLDV-MCNC: 40 MG/DL (ref 8–23)
CALCIUM SERPL-MCNC: 9.4 MG/DL (ref 8.6–10.2)
CHLORIDE BLD-SCNC: 99 MEQ/L (ref 98–107)
CHOLESTEROL, TOTAL: 104 MG/DL (ref 0–199)
CO2: 22 MEQ/L (ref 22–29)
CREAT SERPL-MCNC: 1.49 MG/DL (ref 0.5–0.9)
GFR AFRICAN AMERICAN: 43.1
GFR NON-AFRICAN AMERICAN: 35.6
GLUCOSE BLD-MCNC: 218 MG/DL (ref 74–109)
HBA1C MFR BLD: 8.4 % (ref 4.8–5.9)
HCT VFR BLD CALC: 28.9 % (ref 37–47)
HDLC SERPL-MCNC: 42 MG/DL (ref 40–59)
HEMOGLOBIN: 10 G/DL (ref 12–16)
LDL CHOLESTEROL CALCULATED: 45 MG/DL (ref 0–129)
MCH RBC QN AUTO: 28.6 PG (ref 27–31.3)
MCHC RBC AUTO-ENTMCNC: 34.4 % (ref 33–37)
MCV RBC AUTO: 83 FL (ref 82–100)
PDW BLD-RTO: 13.9 % (ref 11.5–14.5)
PLATELET # BLD: 228 K/UL (ref 130–400)
POTASSIUM SERPL-SCNC: 4.9 MEQ/L (ref 3.5–5.1)
RBC # BLD: 3.48 M/UL (ref 4.2–5.4)
SODIUM BLD-SCNC: 135 MEQ/L (ref 132–144)
TRIGL SERPL-MCNC: 85 MG/DL (ref 0–200)
WBC # BLD: 11.2 K/UL (ref 4.8–10.8)

## 2018-06-29 ENCOUNTER — OFFICE VISIT (OUTPATIENT)
Dept: GERIATRIC MEDICINE | Age: 58
End: 2018-06-29
Payer: MEDICARE

## 2018-06-29 ENCOUNTER — CARE COORDINATION (OUTPATIENT)
Dept: CARE COORDINATION | Age: 58
End: 2018-06-29

## 2018-06-29 DIAGNOSIS — Z79.4 CONTROLLED TYPE 2 DIABETES MELLITUS WITH DIABETIC NEUROPATHY, WITH LONG-TERM CURRENT USE OF INSULIN (HCC): Primary | ICD-10-CM

## 2018-06-29 DIAGNOSIS — E11.40 CONTROLLED TYPE 2 DIABETES MELLITUS WITH DIABETIC NEUROPATHY, WITH LONG-TERM CURRENT USE OF INSULIN (HCC): Primary | ICD-10-CM

## 2018-06-29 PROCEDURE — 3045F PR MOST RECENT HEMOGLOBIN A1C LEVEL 7.0-9.0%: CPT | Performed by: NURSE PRACTITIONER

## 2018-06-29 PROCEDURE — 3017F COLORECTAL CA SCREEN DOC REV: CPT | Performed by: NURSE PRACTITIONER

## 2018-06-29 PROCEDURE — 99309 SBSQ NF CARE MODERATE MDM 30: CPT | Performed by: NURSE PRACTITIONER

## 2018-07-05 LAB
BUN BLDV-MCNC: 29 MG/DL
CALCIUM SERPL-MCNC: 9.2 MG/DL
CHLORIDE BLD-SCNC: 108 MMOL/L
CO2: 18 MMOL/L
CREAT SERPL-MCNC: 1.4 MG/DL
GFR CALCULATED: NORMAL
GLUCOSE BLD-MCNC: 169 MG/DL
POTASSIUM SERPL-SCNC: 4.6 MMOL/L
SODIUM BLD-SCNC: 139 MMOL/L

## 2018-07-09 ENCOUNTER — CARE COORDINATION (OUTPATIENT)
Dept: CASE MANAGEMENT | Age: 58
End: 2018-07-09

## 2018-07-10 ENCOUNTER — TELEPHONE (OUTPATIENT)
Dept: INTERNAL MEDICINE CLINIC | Age: 58
End: 2018-07-10

## 2018-07-11 ENCOUNTER — OFFICE VISIT (OUTPATIENT)
Dept: GERIATRIC MEDICINE | Age: 58
End: 2018-07-11
Payer: MEDICARE

## 2018-07-11 DIAGNOSIS — Z79.4 CONTROLLED TYPE 2 DIABETES MELLITUS WITH DIABETIC NEUROPATHY, WITH LONG-TERM CURRENT USE OF INSULIN (HCC): ICD-10-CM

## 2018-07-11 DIAGNOSIS — E11.40 CONTROLLED TYPE 2 DIABETES MELLITUS WITH DIABETIC NEUROPATHY, WITH LONG-TERM CURRENT USE OF INSULIN (HCC): ICD-10-CM

## 2018-07-11 DIAGNOSIS — I25.119 CORONARY ARTERY DISEASE INVOLVING NATIVE CORONARY ARTERY OF NATIVE HEART WITH ANGINA PECTORIS (HCC): Chronic | ICD-10-CM

## 2018-07-11 DIAGNOSIS — K21.9 GASTROESOPHAGEAL REFLUX DISEASE WITHOUT ESOPHAGITIS: ICD-10-CM

## 2018-07-11 DIAGNOSIS — M15.9 PRIMARY OSTEOARTHRITIS INVOLVING MULTIPLE JOINTS: Primary | ICD-10-CM

## 2018-07-11 PROCEDURE — 3045F PR MOST RECENT HEMOGLOBIN A1C LEVEL 7.0-9.0%: CPT | Performed by: NURSE PRACTITIONER

## 2018-07-11 PROCEDURE — 99316 NF DSCHRG MGMT 30 MIN+: CPT | Performed by: NURSE PRACTITIONER

## 2018-07-16 ENCOUNTER — CARE COORDINATION (OUTPATIENT)
Dept: CASE MANAGEMENT | Age: 58
End: 2018-07-16

## 2018-07-16 NOTE — CARE COORDINATION
Received a Patient Ping alert patient discharged from [facility] on 7/14/18 with Phuong Gary. Will continue to follow.  ADALID FountainN RN  Care Transition Coordinator  [phone]

## 2018-07-17 ENCOUNTER — CARE COORDINATION (OUTPATIENT)
Dept: CASE MANAGEMENT | Age: 58
End: 2018-07-17

## 2018-07-17 DIAGNOSIS — Z96.652 STATUS POST TOTAL LEFT KNEE REPLACEMENT: Primary | ICD-10-CM

## 2018-07-17 NOTE — CARE COORDINATION
[facility] Transitions Initial Follow Up Call    Call within 2 business days of discharge: Yes    Patient: Sasha Michael Patient : 1960   MRN: <[id number]>    Discharge Date: 18 RARS: Readmission Risk Score: 21     Spoke with: [address]: [place]    Non-face-to-face services provided:  Obtained and reviewed discharge summary and/or continuity of care documents    Care Transitions 24 Hour Call    Do you have any ongoing symptoms?:  No  Do you have a copy of your discharge instructions?:  Yes  Do you have all of your prescriptions and are they filled?:  Yes  Have you been contacted by a [employer] [address]?:  No  Have you scheduled your follow up appointment?:  Yes (Comment: 18)  Were you discharged with any Home Care or Post Acute Services:  Yes  Post Acute Services:  Home Health (Comment: [facility]  Patient DME:  [employer] Anabella transfer bench  Do you have support at home?:  Alone  Are you an active caregiver in your home?:  No  Care Transitions Interventions     Patient reports pain level at a 2, uses warm and ice packs for relief. Patient has [facility] services. Patient see ortho 18 and will see PCP 18. Medications reviewed and updated in Epic. Will notify [facility] for further transitional care.      Follow Up  Future Appointments  Date Time Provider Aminata Cortes   2018 12:30 PM Aby Yates MD [facility] [place]   2018 9:15 AM Emma Jj MD [facility]   9/10/2018 9:15 AM Aby Yates MD [facility] [place]   2018 10:15 AM Fermin Bentley MD [address]

## 2018-07-22 VITALS — HEART RATE: 88 BPM | DIASTOLIC BLOOD PRESSURE: 74 MMHG | TEMPERATURE: 97.2 F | SYSTOLIC BLOOD PRESSURE: 140 MMHG

## 2018-07-22 NOTE — PROGRESS NOTES
11:27:59  ______________________________  JOSE Gaspar/[id number]  D: 06/25/2018 18:35:48  T: 06/26/2018 05:16:51    cc: Leia Marro[facility]

## 2018-07-23 ENCOUNTER — CARE COORDINATION (OUTPATIENT)
Dept: CARE COORDINATION | Age: 58
End: 2018-07-23

## 2018-07-23 ENCOUNTER — OFFICE VISIT (OUTPATIENT)
Dept: INTERNAL MEDICINE CLINIC | Age: 58
End: 2018-07-23
Payer: MEDICARE

## 2018-07-23 VITALS
DIASTOLIC BLOOD PRESSURE: 70 MMHG | WEIGHT: 224 LBS | HEART RATE: 76 BPM | OXYGEN SATURATION: 98 % | TEMPERATURE: 96.7 F | BODY MASS INDEX: 36 KG/M2 | HEIGHT: 66 IN | SYSTOLIC BLOOD PRESSURE: 110 MMHG

## 2018-07-23 DIAGNOSIS — Z96.652 S/P TOTAL KNEE ARTHROPLASTY, LEFT: ICD-10-CM

## 2018-07-23 DIAGNOSIS — G81.94 HEMIPARESIS, LEFT (HCC): ICD-10-CM

## 2018-07-23 DIAGNOSIS — R26.89 BALANCE PROBLEMS: Primary | ICD-10-CM

## 2018-07-23 DIAGNOSIS — E53.8 VITAMIN B 12 DEFICIENCY: ICD-10-CM

## 2018-07-23 DIAGNOSIS — E11.42 DIABETIC POLYNEUROPATHY ASSOCIATED WITH TYPE 2 DIABETES MELLITUS (HCC): ICD-10-CM

## 2018-07-23 PROCEDURE — 1036F TOBACCO NON-USER: CPT | Performed by: INTERNAL MEDICINE

## 2018-07-23 PROCEDURE — 3045F PR MOST RECENT HEMOGLOBIN A1C LEVEL 7.0-9.0%: CPT | Performed by: INTERNAL MEDICINE

## 2018-07-23 PROCEDURE — G8427 DOCREV CUR MEDS BY ELIG CLIN: HCPCS | Performed by: INTERNAL MEDICINE

## 2018-07-23 PROCEDURE — G8598 ASA/ANTIPLAT THER USED: HCPCS | Performed by: INTERNAL MEDICINE

## 2018-07-23 PROCEDURE — 1111F DSCHRG MED/CURRENT MED MERGE: CPT | Performed by: INTERNAL MEDICINE

## 2018-07-23 PROCEDURE — 99214 OFFICE O/P EST MOD 30 MIN: CPT | Performed by: INTERNAL MEDICINE

## 2018-07-23 PROCEDURE — 2022F DILAT RTA XM EVC RTNOPTHY: CPT | Performed by: INTERNAL MEDICINE

## 2018-07-23 PROCEDURE — 3017F COLORECTAL CA SCREEN DOC REV: CPT | Performed by: INTERNAL MEDICINE

## 2018-07-23 PROCEDURE — G8417 CALC BMI ABV UP PARAM F/U: HCPCS | Performed by: INTERNAL MEDICINE

## 2018-07-23 RX ORDER — ASPIRIN 81 MG/1
TABLET ORAL
Refills: 1 | COMMUNITY
Start: 2018-07-17 | End: 2018-08-30 | Stop reason: SDUPTHER

## 2018-07-23 RX ORDER — FLUCONAZOLE 100 MG/1
100 TABLET ORAL ONCE
Qty: 1 TABLET | Refills: 2 | Status: SHIPPED | OUTPATIENT
Start: 2018-07-23 | End: 2018-07-23

## 2018-07-23 RX ORDER — LINAGLIPTIN 5 MG/1
TABLET, FILM COATED ORAL
Refills: 0 | COMMUNITY
Start: 2018-07-16 | End: 2018-08-01 | Stop reason: SDUPTHER

## 2018-07-23 ASSESSMENT — ENCOUNTER SYMPTOMS
CHOKING: 0
TROUBLE SWALLOWING: 0
BLOOD IN STOOL: 0
ABDOMINAL PAIN: 0
SHORTNESS OF BREATH: 0
CHEST TIGHTNESS: 0
BACK PAIN: 1

## 2018-07-23 NOTE — PROGRESS NOTES
 Chloride 06/22/2018 94* 98 - 107 mEq/L Final    CO2 06/22/2018 22  22 - 29 mEq/L Final    Anion Gap 06/22/2018 17* 7 - 13 mEq/L Final    Glucose 06/22/2018 411* 74 - 109 mg/dL Final    BUN 06/22/2018 39* 8 - 23 mg/dL Final    CREATININE 06/22/2018 1.82* 0.50 - 0.90 mg/dL Final    GFR Non- 06/22/2018 28.3* >60 Final    Comment: >60 mL/min/1.73m2 EGFR, calc. for ages 25 and older using the  MDRD formula (not corrected for weight), is valid for stable  renal function.  GFR  06/22/2018 34.2* >60 Final    Comment: >60 mL/min/1.73m2 EGFR, calc. for ages 25 and older using the  MDRD formula (not corrected for weight), is valid for stable  renal function.       Calcium 06/22/2018 9.6  8.6 - 10.2 mg/dL Final    WBC 06/22/2018 18.3* 4.8 - 10.8 K/uL Final    RBC 06/22/2018 3.96* 4.20 - 5.40 M/uL Final    Hemoglobin 06/22/2018 11.5* 12.0 - 16.0 g/dL Final    Hematocrit 06/22/2018 33.3* 37.0 - 47.0 % Final    MCV 06/22/2018 84.1  82.0 - 100.0 fL Final    MCH 06/22/2018 29.2  27.0 - 31.3 pg Final    MCHC 06/22/2018 34.7  33.0 - 37.0 % Final    RDW 06/22/2018 13.4  11.5 - 14.5 % Final    Platelets 18/45/5328 248  130 - 400 K/uL Final    POC Glucose 06/21/2018 418* 60 - 115 mg/dl Final    Performed on 06/21/2018 ACCU-CHEK   Final    Notified RN or MD    POC Glucose 06/22/2018 432* 60 - 115 mg/dl Final    Performed on 06/22/2018 ACCU-CHEK   Final    Notified RN or MD    POC Glucose 06/22/2018 534* 60 - 115 mg/dl Final    Performed on 06/22/2018 ACCU-CHEK   Final    Notified RN or MD    POC Glucose 06/22/2018 502* 60 - 115 mg/dl Final    Performed on 06/22/2018 ACCU-CHEK   Final    Notified RN or MD    POC Glucose 06/22/2018 463* 60 - 115 mg/dl Final    Performed on 06/22/2018 ACCU-CHEK   Final    POC Glucose 06/22/2018 337* 60 - 115 mg/dl Final    Performed on 06/22/2018 ACCU-CHEK   Final    WBC 06/23/2018 14.1* 4.8 - 10.8 K/uL Final    RBC 06/23/2018 3.31* 4.20 - 5.40 M/uL Final    Hemoglobin 06/23/2018 9.7* 12.0 - 16.0 g/dL Final    Hematocrit 06/23/2018 27.8* 37.0 - 47.0 % Final    MCV 06/23/2018 83.9  82.0 - 100.0 fL Final    MCH 06/23/2018 29.4  27.0 - 31.3 pg Final    MCHC 06/23/2018 35.0  33.0 - 37.0 % Final    RDW 06/23/2018 14.0  11.5 - 14.5 % Final    Platelets 29/06/5089 194  130 - 400 K/uL Final    POC Glucose 06/22/2018 172* 60 - 115 mg/dl Final    Performed on 06/22/2018 ACCU-CHEK   Final    POC Glucose 06/22/2018 110  60 - 115 mg/dl Final    Performed on 06/22/2018 ACCU-CHEK   Final    POC Glucose 06/23/2018 104  60 - 115 mg/dl Final    Performed on 06/23/2018 ACCU-CHEK   Final    POC Glucose 06/23/2018 131* 60 - 115 mg/dl Final    Performed on 06/23/2018 ACCU-CHEK   Final    Sodium 06/23/2018 130* 132 - 144 mEq/L Final    Potassium 06/23/2018 4.2  3.5 - 5.1 mEq/L Final    Chloride 06/23/2018 93* 98 - 107 mEq/L Final    CO2 06/23/2018 22  22 - 29 mEq/L Final    Anion Gap 06/23/2018 15* 7 - 13 mEq/L Final    Glucose 06/23/2018 167* 74 - 109 mg/dL Final    BUN 06/23/2018 51* 8 - 23 mg/dL Final    CREATININE 06/23/2018 1.92* 0.50 - 0.90 mg/dL Final    GFR Non- 06/23/2018 26.6* >60 Final    Comment: >60 mL/min/1.73m2 EGFR, calc. for ages 25 and older using the  MDRD formula (not corrected for weight), is valid for stable  renal function.  GFR  06/23/2018 32.2* >60 Final    Comment: >60 mL/min/1.73m2 EGFR, calc. for ages 25 and older using the  MDRD formula (not corrected for weight), is valid for stable  renal function.       Calcium 06/23/2018 9.0  8.6 - 10.2 mg/dL Final    POC Glucose 06/23/2018 208* 60 - 115 mg/dl Final    Performed on 06/23/2018 ACCU-CHEK   Final    POC Glucose 06/23/2018 212* 60 - 115 mg/dl Final    Performed on 06/23/2018 ACCU-CHEK   Final    POC Glucose 06/23/2018 72  60 - 115 mg/dl Final    Performed on 06/23/2018 ACCU-CHEK   Final    POC Glucose 06/23/2018 163* 60 - 115 Negative Final    Ketones, Urine 06/14/2018 Negative  Negative mg/dL Final    Specific [place], UA 06/14/2018 1.021  1.005 - 1.030 Final    Blood, Urine 06/14/2018 Negative  Negative Final    pH, UA 06/14/2018 5.5  5.0 - 9.0 Final    Protein, UA 06/14/2018 >=300* Negative mg/dL Final    Urobilinogen, Urine 06/14/2018 1.0  <2.0 E.U./dL Final    Nitrite, Urine 06/14/2018 Negative  Negative Final    Leukocyte Esterase, Urine 06/14/2018 Negative  Negative Final    Urine Reflex to Culture 06/14/2018 YES   Final    ABO/Rh 06/14/2018 A POS   Final    Antibody Screen 06/14/2018 NEG   Final    Sodium 06/14/2018 134  132 - 144 mEq/L Final    Potassium 06/14/2018 4.4  3.5 - 5.1 mEq/L Final    Chloride 06/14/2018 97* 98 - 107 mEq/L Final    CO2 06/14/2018 22  22 - 29 mEq/L Final    Anion Gap 06/14/2018 15* 7 - 13 mEq/L Final    Glucose 06/14/2018 345* 74 - 109 mg/dL Final    BUN 06/14/2018 23  8 - 23 mg/dL Final    CREATININE 06/14/2018 1.25* 0.50 - 0.90 mg/dL Final    GFR Non- 06/14/2018 43.7* >60 Final    Comment: >60 mL/min/1.73m2 EGFR, calc. for ages 25 and older using the  MDRD formula (not corrected for weight), is valid for stable  renal function.  GFR  06/14/2018 52.8* >60 Final    Comment: >60 mL/min/1.73m2 EGFR, calc. for ages 25 and older using the  MDRD formula (not corrected for weight), is valid for stable  renal function.       Calcium 06/14/2018 10.3* 8.6 - 10.2 mg/dL Final    WBC 06/14/2018 8.7  4.8 - 10.8 K/uL Final    RBC 06/14/2018 3.84* 4.20 - 5.40 M/uL Final    Hemoglobin 06/14/2018 11.3* 12.0 - 16.0 g/dL Final    Hematocrit 06/14/2018 32.4* 37.0 - 47.0 % Final    MCV 06/14/2018 84.4  82.0 - 100.0 fL Final    MCH 06/14/2018 29.4  27.0 - 31.3 pg Final    MCHC 06/14/2018 34.8  33.0 - 37.0 % Final    RDW 06/14/2018 13.8  11.5 - 14.5 % Final    Platelets 64/50/4069 236  130 - 400 K/uL Final    Urine Culture, Routine 06/14/2018    Final Final    RBC 04/24/2018 4.10* 4.20 - 5.40 M/uL Final    Hemoglobin 04/24/2018 12.0  12.0 - 16.0 g/dL Final    Hematocrit 04/24/2018 34.5* 37.0 - 47.0 % Final    MCV 04/24/2018 84.3  82.0 - 100.0 fL Final    MCH 04/24/2018 29.2  27.0 - 31.3 pg Final    MCHC 04/24/2018 34.6  33.0 - 37.0 % Final    RDW 04/24/2018 14.6* 11.5 - 14.5 % Final    Platelets 65/19/0012 309  130 - 400 K/uL Final   There may be more visits with results that are not included. HPI:    Knee Pain    The pain is present in the left knee. The quality of the pain is described as aching and cramping. The pain is moderate. The pain has been fluctuating since onset. Pertinent negatives include no inability to bear weight or numbness. The symptoms are aggravated by movement, palpation and weight bearing. She has tried acetaminophen and rest for the symptoms. The treatment provided moderate relief. More detail above in the chief complaint(s), interim history and below in the review of systems. Past Medical History:   Diagnosis Date    Anxiety     CAD S/P percutaneous coronary angioplasty 2015, 2018    stent per dr Shari Rodriguez    Diabetic nephropathy with proteinuria ([facility] [address] 2014    DJD (degenerative joint disease) of knee     Dr Gregory Darling GERD (gastroesophageal reflux disease)     Hemiparesis, left ([facility] [address] 2013    [facility] Ass[facility] Living [facility]    History of seizures     History of type C viral hepatitis     HTN (hypertension)     Hyperlipidemia     Impaired mobility and activities of daily living     Mediastinal lymphadenopathy 2013    Dr Wilfredo Anderson, David Choi    Metabolic syndrome     Neurogenic urinary incontinence 2013    Neuropathy in diabetes ([facility] [address] [facility]     Obesity (BMI 30-39. 9)     Recurrent UTI     Renal insufficiency     S/P colonoscopy 2014    [facility], focal active colitis    Schizophrenia, paranoid, chronic ([facility] [address]     [facility]   [employer] vessel disease, cerebrovascular 2013    Status post total knee tenderness. Exam limited due to abdominal adiposity      Musculoskeletal:        Right knee: She exhibits normal range of motion and no swelling. No tenderness found. Left knee: She exhibits decreased range of motion and swelling. Tenderness found. Both knees are status post arthroplasty   Right knee has normal range of motion, no swelling  Left knee has minimal swelling, minimal erythema, decreased flexion, slight tenderness with palpation of the midline surgical scar which is well-healed    Walks slowly with walker assistance to help with balance   Neurological: She is alert and oriented to person, place, and time. Coordination normal.   Mild left hemiparesis, old   Skin: Skin is warm and dry. There is pallor. Psychiatric: Her speech is normal. Judgment normal. Her mood appears not anxious. Her affect is blunt. She is slowed and withdrawn. Thought content is not delusional. Cognition and memory are normal. She does not express inappropriate judgment. She exhibits a depressed mood. She exhibits normal recent memory and normal remote memory. Good insight, motivation  She is attentive. Assessment:    Michelle was seen today for knee pain and vaginitis.     Diagnoses and all orders for this visit:    Balance problems              Multifactorial, ischemic brain disease, left hemiparesis, deconditioning after knee replacement, pain, neuropathy  The patient will benefit from having grab bars installed in her house to help her improve mobility and prevent falls  -     External Referral to Physical Therapy    S/P total knee arthroplasty, left  -     External Referral to Physical Therapy    Hemiparesis, left ([facility] [address]  -     External Referral to Physical Therapy    Diabetic polyneuropathy associated with type 2 diabetes mellitus ([facility] [address]  -     External Referral to Physical Therapy    Vitamin B 12 deficiency              Resume oral supplementation as soon as possible    Other orders  -     cyanocobalamin ([employer] VITAMIN B12) 1000 MCG tablet; Take 1 tablet by mouth daily  -     fluconazole (DIFLUCAN) 100 MG tablet; Take 1 tablet by mouth once for 1 dose        Plan:    Reviewed with the patient (/and caregiver if present): current health status, medications, activities and diet. See also orders and comments in the assessment section. Today's diagnosis (in the context of chronic problems) was discussed with patient (/and caregiver if present), questions answered. Patient counseled and encouraged re: daily effort to improve diet (a high fiber, low calorie, low sodium and caffeine diet, divided into 3 main meals daily) and exercise habits (more aerobic exercise as tolerated), better stress management, will result in improved health and help in better management of the current chronic conditions in the long run. Side effects, adverse effects of the medication prescribed (or refilled) today, treatment plan/ rationale and result expectations have (again) been discussed with the patient who expresses understanding and consents to proceed as outlined above. Additional advise included in the \"after visit summary\". Orders Placed This Encounter   Medications    cyanocobalamin ([employer] VITAMIN B12) 1000 MCG tablet     Sig: Take 1 tablet by mouth daily     Dispense:  30 tablet     Refill:  5    fluconazole (DIFLUCAN) 100 MG tablet     Sig: Take 1 tablet by mouth once for 1 dose     Dispense:  1 tablet     Refill:  2       Orders Placed This Encounter   Procedures    External Referral to Physical Therapy     Referral Priority:   Routine     Referral Type:   Eval and Treat     Referral Reason:   Specialty Services Required     Requested Specialty:   Physical Medicine and Rehab     Number of Visits Requested:   1     Further workup and plan will be determined based on clinical progression and follow up test/ treatment results. Close follow up needed to evaluate treatment results and for care coordination.    Return if

## 2018-07-24 ENCOUNTER — OFFICE VISIT (OUTPATIENT)
Dept: UROLOGY | Age: 58
End: 2018-07-24
Payer: MEDICARE

## 2018-07-24 VITALS
DIASTOLIC BLOOD PRESSURE: 60 MMHG | HEIGHT: 66 IN | WEIGHT: 224 LBS | HEART RATE: 71 BPM | BODY MASS INDEX: 36 KG/M2 | SYSTOLIC BLOOD PRESSURE: 108 MMHG

## 2018-07-24 DIAGNOSIS — R39.81 URINARY INCONTINENCE DUE TO COGNITIVE IMPAIRMENT: Primary | ICD-10-CM

## 2018-07-24 DIAGNOSIS — R33.9 URINARY RETENTION: ICD-10-CM

## 2018-07-24 LAB
BILIRUBIN, POC: NORMAL
BLOOD URINE, POC: NORMAL
CLARITY, POC: NORMAL
COLOR, POC: YELLOW
GLUCOSE URINE, POC: NORMAL
KETONES, POC: NORMAL
LEUKOCYTE EST, POC: NORMAL
NITRITE, POC: NORMAL
PH, POC: 5
POST VOID RESIDUAL (PVR): 123 ML
PROTEIN, POC: NORMAL
SPECIFIC GRAVITY, POC: >=1.03
UROBILINOGEN, POC: 0.2

## 2018-07-24 PROCEDURE — 1111F DSCHRG MED/CURRENT MED MERGE: CPT | Performed by: UROLOGY

## 2018-07-24 PROCEDURE — 1036F TOBACCO NON-USER: CPT | Performed by: UROLOGY

## 2018-07-24 PROCEDURE — G8427 DOCREV CUR MEDS BY ELIG CLIN: HCPCS | Performed by: UROLOGY

## 2018-07-24 PROCEDURE — G8598 ASA/ANTIPLAT THER USED: HCPCS | Performed by: UROLOGY

## 2018-07-24 PROCEDURE — 99213 OFFICE O/P EST LOW 20 MIN: CPT | Performed by: UROLOGY

## 2018-07-24 PROCEDURE — 51798 US URINE CAPACITY MEASURE: CPT | Performed by: UROLOGY

## 2018-07-24 PROCEDURE — 81003 URINALYSIS AUTO W/O SCOPE: CPT | Performed by: UROLOGY

## 2018-07-24 PROCEDURE — 3017F COLORECTAL CA SCREEN DOC REV: CPT | Performed by: UROLOGY

## 2018-07-24 PROCEDURE — G8417 CALC BMI ABV UP PARAM F/U: HCPCS | Performed by: UROLOGY

## 2018-07-24 RX ORDER — NITROFURANTOIN MACROCRYSTALS 50 MG/1
50 CAPSULE ORAL NIGHTLY
Qty: 90 CAPSULE | Refills: 3 | Status: SHIPPED | OUTPATIENT
Start: 2018-07-24 | End: 2018-07-31

## 2018-07-24 NOTE — PROGRESS NOTES
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 Tej Mejia      x1 Dr. Francesca Arauz, Dr Jaz Ferrell 2018    HYSTERECTOMY, TOTAL ABDOMINAL      one ovary intact, Dr Chopra Males, menorrhagia    [place] TOTAL KNEE ARTHROPLASTY Left 2018    LEFT KNEE TOTAL KNEE ARTHROPLASTY, SHAYNA, NERVE BLOCK performed by Vamsi Charles MD at [address] [place] [address] Right     TOTAL KNEE ARTHROPLASTY  16    Dr Cleo Torrez Marital status:      Spouse name: N/A    Number of children: 2    Years of education: N/A     Occupational History    disabled      Social History Main Topics    Smoking status: Passive Smoke Exposure - Never Smoker    Smokeless tobacco: Never Used    Alcohol use No    Drug use: No    Sexual activity: Not Currently     Other Topics Concern    None     Social History Narrative    Born in [place], one of 5    Keeps in touch with twin sister Garrett Butler to [facility], , 2 children    Worked at [employer] due to mental illness    Lived at [facility], was discharged, returned to independent living in 2017 and has adjusted well    One son and one daughter, keep in touch     Family History   Problem Relation Age of Onset    Cancer Mother 76        survived   Darcella Fannie Hypertension Father     Diabetes Sister     Mental Illness Cholecalciferol (VITAMIN D3) 1000 units TABS TAKE (1) TABLET BY MOUTH DAILY 90 tablet 10    Alcohol Swabs (EASY TOUCH ALCOHOL PREP MEDIUM) 70 % PADS USE AS DIRECTED THREE TIMES A  each 3    Blood Glucose Monitoring Suppl (FREESTYLE LITE) CORETTA use as directed  0    nitroGLYCERIN (NITROSTAT) 0.4 MG SL tablet DISSOLVE 1 TAB UNDER THE TONGUE AS NEEDED FOR CHEST PAIN EVERY 5 MINUTES UP TO 3 TIMES IF NO RELIEF CALL 911 75 tablet 2     No current facility-administered medications for this visit. Codeine  All reviewed and verified by Dr Shanice Bender on today's visit    No results found for: PSA, PSADIA  Results for POC orders placed in visit on 07/24/18   POCT Urinalysis No Micro (Auto)   Result Value Ref Range    Color, UA yellow     Clarity, UA slightly cloudy     Glucose, UA POC neg     Bilirubin, UA neg     Ketones, UA neg     Spec Grav, UA >=1.030     Blood, UA POC trace-intact     pH, UA 5.0     Protein, UA POC >=300 mg/dL     Urobilinogen, UA 0.2     Leukocytes, UA trace     Nitrite, UA neg    poct post void residual   Result Value Ref Range    post void residual 123 ml    Narrative    A point of care test   Post Void Residual was completed by performing  ultrasound scan of the bladder and  reviewed by Dr Shanice Bender       Physical Exam  Vitals:    07/24/18 0924   BP: 108/60   Pulse: 71   Weight: 224 lb (101.6 kg)   Height: 5' 6\" (1.676 m)     Constitutional: patient is oriented to person, place, and time. patient appears well-developed. not in distress. Ears: Adequate hearing/no hearing loss  Head: Normocephalic. Atraumatic  Neck: Normal range of motion. Cardiovascular: Normal rate, BP reviewed. normal rhythm  Pulmonary/Chest: Normal respiratory effort  no wheezing  Abdominal: Not distended. No suprapubic discomfort  Urologic Exam  Postvoid residual 123 mL. No flank pain. Vaginal exam not indicated . Musculoskeletal: Normal range of motion. Ambulatory.   Extremities: Mild edema   Neurological:

## 2018-07-27 LAB
ORGANISM: ABNORMAL
URINE CULTURE, ROUTINE: ABNORMAL
URINE CULTURE, ROUTINE: ABNORMAL

## 2018-07-30 VITALS
HEART RATE: 75 BPM | SYSTOLIC BLOOD PRESSURE: 145 MMHG | DIASTOLIC BLOOD PRESSURE: 75 MMHG | TEMPERATURE: 97.4 F | RESPIRATION RATE: 18 BRPM

## 2018-07-30 NOTE — PROGRESS NOTES
Jennifer Max : 1960 DOS: 2018     [address]    This is a discharge summary. The patient is a 33-year-old who had her left knee arthroplasty because of degenerative disk disease. She also has a history of anxiety, CAD, DM 2, GERD, seizures, hypertension, hyperlipidemia, renal insufficiency, schizophrenia. She actually did fairly well in physical therapy. She was walking. Her walker has a tray on it. She progressed well. Pain was under good control. She has her medications set up specifically in an exact pack just so that she does not forget to take them. She does fairly well with that. She was having some hyperglycemia while she was at our facility. Medication adjustments were done and her blood sugars were much improved at discharge. We got them down mostly under 200. She did gain a little bit of weight while she was with us due to lack of activity, but she was obese upon entering the facility itself. She has no peripheral edema. She makes no complaints at the time of my exam.    MEDICATIONS AND ALLERGIES: Tylenol p.r.n., aripiprazole 10 mg daily, atorvastatin 40 mg daily, Basaglar insulin 28 units b.i.d., carvedilol 6.25 mg b.i.d., clopidogrel 35 mg daily, cyanocobalamin 500 mcg, 2 tablets daily, gabapentin 300 mg capsules daily, nitroglycerin tablets sublingually 0.4 mg, 1 q.5 minutes x3 if needed for chest pain p.r.n., NovoLog 20 units t.i.d. at meals, she will hold if blood sugars are less than 100, Nystatin cream for abdominal fold yeast b.i.d., omeprazole 20 mg daily, sertraline 50 mg daily, trazodone 50 mg tablet, she takes 3 q.h.s. for sleep, vitamin D 1000 units daily, Tradjenta 5 mg daily.     SOCIAL HX/ FAMILY MEDICAL HX: SEE EPIC H & P     Past Medical History:   Diagnosis Date    Anxiety     CAD S/P percutaneous coronary angioplasty ,     stent per dr Shari Rodriguez    [facility] proteinuria [facility] [address]     DJD (degenerative joint disease) of knee     Dr Fabricio Pederson GERD (gastroesophageal reflux disease)     Hemiparesis, left ([address] 2013    [facility]    History of seizures     History of type C viral hepatitis     HTN (hypertension)     Hyperlipidemia     Impaired mobility and activities of daily living     Mediastinal lymphadenopathy 2013    [employer]    Metabolic syndrome     Neurogenic urinary incontinence 2013    Neuropathy in diabetes ([facility] [address]     Obesity (BMI 30-39. 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 The patient is sitting up in bed. She is in no acute distress. She is awake, alert, and pleasant. Heart has regular rate and rhythm. Lungs are clear to auscultation. Abdomen is obese. No hepatomegaly. Soft. Positive bowel sounds. Lower extremities have no edema. Dorsal pedal pulses are 1, equal bilaterally. She has some mild heme stain. ASSESSMENT AND PLAN:   1. DJD of the knee with total knee replacement on the left. She is under good pain control and healed nicely. 2.  CAD, DM 2, under better control. 3.  GERD, seizures, hypertension, hyperlipidemia, renal insufficiency, schizophrenia. The patient will go home with PT, OT, home health care. We will continue her exact pack, her new medications will be added to the exact pack. She is aware she will have to have that done.  She will follow up with her PCP in 1 to 2

## 2018-08-01 RX ORDER — LINAGLIPTIN 5 MG/1
TABLET, FILM COATED ORAL
Qty: 30 TABLET | Refills: 5 | Status: SHIPPED | OUTPATIENT
Start: 2018-08-01 | End: 2018-12-14 | Stop reason: SDUPTHER

## 2018-08-09 ENCOUNTER — TELEPHONE (OUTPATIENT)
Dept: INTERNAL MEDICINE CLINIC | Age: 58
End: 2018-08-09

## 2018-08-09 DIAGNOSIS — Z12.39 BREAST CANCER SCREENING: Primary | ICD-10-CM

## 2018-09-07 ENCOUNTER — HOSPITAL ENCOUNTER (OUTPATIENT)
Dept: WOMENS IMAGING | Age: 58
Discharge: HOME | End: 2018-09-09
Payer: MEDICARE

## 2018-09-07 DIAGNOSIS — Z12.39 BREAST CANCER SCREENING: ICD-10-CM

## 2018-09-07 PROCEDURE — 77067 SCR MAMMO BI INCL CAD: CPT

## 2018-09-07 RX ORDER — INSULIN GLARGINE 100 [IU]/ML
INJECTION, SOLUTION SUBCUTANEOUS
Qty: 15 ML | Refills: 2 | OUTPATIENT
Start: 2018-09-07

## 2018-09-10 RX ORDER — INSULIN ASPART 100 [IU]/ML
INJECTION, SOLUTION INTRAVENOUS; SUBCUTANEOUS
Qty: 15 ML | Refills: 2 | Status: SHIPPED | OUTPATIENT
Start: 2018-09-10 | End: 2018-09-11 | Stop reason: SDUPTHER

## 2018-09-11 ENCOUNTER — OFFICE VISIT (OUTPATIENT)
Dept: INTERNAL MEDICINE CLINIC | Age: 58
End: 2018-09-11
Payer: MEDICARE

## 2018-09-11 ENCOUNTER — CARE COORDINATION (OUTPATIENT)
Dept: CARE COORDINATION | Age: 58
End: 2018-09-11

## 2018-09-11 VITALS
OXYGEN SATURATION: 99 % | TEMPERATURE: 97.6 F | BODY MASS INDEX: 36.8 KG/M2 | WEIGHT: 229 LBS | HEART RATE: 67 BPM | DIASTOLIC BLOOD PRESSURE: 85 MMHG | SYSTOLIC BLOOD PRESSURE: 131 MMHG | HEIGHT: 66 IN

## 2018-09-11 DIAGNOSIS — E11.42 DIABETIC POLYNEUROPATHY ASSOCIATED WITH TYPE 2 DIABETES MELLITUS (HCC): Chronic | ICD-10-CM

## 2018-09-11 DIAGNOSIS — Z79.4 CONTROLLED TYPE 2 DIABETES MELLITUS WITH DIABETIC NEUROPATHY, WITH LONG-TERM CURRENT USE OF INSULIN (HCC): Primary | Chronic | ICD-10-CM

## 2018-09-11 DIAGNOSIS — E11.40 CONTROLLED TYPE 2 DIABETES MELLITUS WITH DIABETIC NEUROPATHY, WITH LONG-TERM CURRENT USE OF INSULIN (HCC): Primary | Chronic | ICD-10-CM

## 2018-09-11 LAB — HBA1C MFR BLD: 6.4 %

## 2018-09-11 PROCEDURE — 99214 OFFICE O/P EST MOD 30 MIN: CPT | Performed by: INTERNAL MEDICINE

## 2018-09-11 PROCEDURE — G8417 CALC BMI ABV UP PARAM F/U: HCPCS | Performed by: INTERNAL MEDICINE

## 2018-09-11 PROCEDURE — 83036 HEMOGLOBIN GLYCOSYLATED A1C: CPT | Performed by: INTERNAL MEDICINE

## 2018-09-11 PROCEDURE — 2022F DILAT RTA XM EVC RTNOPTHY: CPT | Performed by: INTERNAL MEDICINE

## 2018-09-11 PROCEDURE — 3017F COLORECTAL CA SCREEN DOC REV: CPT | Performed by: INTERNAL MEDICINE

## 2018-09-11 PROCEDURE — G8598 ASA/ANTIPLAT THER USED: HCPCS | Performed by: INTERNAL MEDICINE

## 2018-09-11 PROCEDURE — 1036F TOBACCO NON-USER: CPT | Performed by: INTERNAL MEDICINE

## 2018-09-11 PROCEDURE — G8428 CUR MEDS NOT DOCUMENT: HCPCS | Performed by: INTERNAL MEDICINE

## 2018-09-11 PROCEDURE — 3045F PR MOST RECENT HEMOGLOBIN A1C LEVEL 7.0-9.0%: CPT | Performed by: INTERNAL MEDICINE

## 2018-09-11 RX ORDER — PREGABALIN 75 MG/1
75 CAPSULE ORAL 2 TIMES DAILY
Qty: 60 CAPSULE | Refills: 1 | Status: SHIPPED | OUTPATIENT
Start: 2018-09-11 | End: 2018-12-14 | Stop reason: SDUPTHER

## 2018-09-11 ASSESSMENT — ENCOUNTER SYMPTOMS
ABDOMINAL PAIN: 0
TROUBLE SWALLOWING: 0
COUGH: 0
CHOKING: 0
SHORTNESS OF BREATH: 0
CHEST TIGHTNESS: 0
BLURRED VISION: 0
BLOOD IN STOOL: 0

## 2018-09-11 NOTE — PROGRESS NOTES
Arely Chaudhari is a 59 y.o. female with history of coronary artery disease, strokes, hypertension, psychosis, obesity, who presents with     Chief Complaint   Patient presents with    Diabetes     fasting home , A1C 6.4, the patient resumed taking her basal and preprandial insulin    Foot Pain     still prominent in spite of use of gabapentin, podiatrist suggested a trial of Lyrica       Interim history: Since the past office visit with me 2 months ago, the patient has continued to live independently. Had no emergency room or hospital admissions. Saw the urologist and had a mammogram.  Recovered very well after her second knee arthroplasty, very little residual pain and full mobility. Concerned about elevated blood sugar in the morning, over 200. Checks blood sugar 4 times daily, always preprandial, finds it elevated most of the time (150-200+). At times, especially after meals, she feels her blood sugar drops but she does not recheck it.      The following laboratory reports since the past visit were reviewed (the ones pertinent to today's visit were discussed with the patient):    Office Visit on 07/24/2018   Component Date Value Ref Range Status    Color, UA 07/24/2018 yellow   Final    Clarity, UA 07/24/2018 slightly cloudy   Final    Glucose, UA POC 07/24/2018 neg   Final    Bilirubin, UA 07/24/2018 neg   Final    Ketones, UA 07/24/2018 neg   Final    Spec Grav, UA 07/24/2018 >=1.030   Final    Blood, UA POC 07/24/2018 trace-intact   Final    pH, UA 07/24/2018 5.0   Final    Protein, UA POC 07/24/2018 >=300 mg/dL   Final    Urobilinogen, UA 07/24/2018 0.2   Final    Leukocytes, UA 07/24/2018 trace   Final    Nitrite, UA 07/24/2018 neg   Final    post void residual 07/24/2018 123  ml Final    Organism 07/24/2018 Proteus mirabilis*  Final    Urine Culture, Routine 07/24/2018 >100,000 CFU/ml   Final   Orders Only on 07/06/2018   Component Date Value Ref Range Status    Sodium 18 and older using the  MDRD formula (not corrected for weight), is valid for stable  renal function.       Calcium 06/22/2018 9.6  8.6 - 10.2 mg/dL Final    WBC 06/22/2018 18.3* 4.8 - 10.8 K/uL Final    RBC 06/22/2018 3.96* 4.20 - 5.40 M/uL Final    Hemoglobin 06/22/2018 11.5* 12.0 - 16.0 g/dL Final    Hematocrit 06/22/2018 33.3* 37.0 - 47.0 % Final    MCV 06/22/2018 84.1  82.0 - 100.0 fL Final    MCH 06/22/2018 29.2  27.0 - 31.3 pg Final    MCHC 06/22/2018 34.7  33.0 - 37.0 % Final    RDW 06/22/2018 13.4  11.5 - 14.5 % Final    Platelets 24/84/1045 248  130 - 400 K/uL Final    POC Glucose 06/21/2018 418* 60 - 115 mg/dl Final    Performed on 06/21/2018 ACCU-CHEK   Final    Notified RN or MD    POC Glucose 06/22/2018 432* 60 - 115 mg/dl Final    Performed on 06/22/2018 ACCU-CHEK   Final    Notified RN or MD    POC Glucose 06/22/2018 534* 60 - 115 mg/dl Final    Performed on 06/22/2018 ACCU-CHEK   Final    Notified RN or MD    POC Glucose 06/22/2018 502* 60 - 115 mg/dl Final    Performed on 06/22/2018 ACCU-CHEK   Final    Notified RN or MD    POC Glucose 06/22/2018 463* 60 - 115 mg/dl Final    Performed on 06/22/2018 ACCU-CHEK   Final    POC Glucose 06/22/2018 337* 60 - 115 mg/dl Final    Performed on 06/22/2018 ACCU-CHEK   Final    WBC 06/23/2018 14.1* 4.8 - 10.8 K/uL Final    RBC 06/23/2018 3.31* 4.20 - 5.40 M/uL Final    Hemoglobin 06/23/2018 9.7* 12.0 - 16.0 g/dL Final    Hematocrit 06/23/2018 27.8* 37.0 - 47.0 % Final    MCV 06/23/2018 83.9  82.0 - 100.0 fL Final    MCH 06/23/2018 29.4  27.0 - 31.3 pg Final    MCHC 06/23/2018 35.0  33.0 - 37.0 % Final    RDW 06/23/2018 14.0  11.5 - 14.5 % Final    Platelets 07/94/1592 194  130 - 400 K/uL Final    POC Glucose 06/22/2018 172* 60 - 115 mg/dl Final    Performed on 06/22/2018 ACCU-CHEK   Final    POC Glucose 06/22/2018 110  60 - 115 mg/dl Final    Performed on 06/22/2018 ACCU-CHEK   Final    POC Glucose 06/23/2018 104  60 - 115 Final    Sodium 06/14/2018 134  132 - 144 mEq/L Final    Potassium 06/14/2018 4.4  3.5 - 5.1 mEq/L Final    Chloride 06/14/2018 97* 98 - 107 mEq/L Final    CO2 06/14/2018 22  22 - 29 mEq/L Final    Anion Gap 06/14/2018 15* 7 - 13 mEq/L Final    Glucose 06/14/2018 345* 74 - 109 mg/dL Final    BUN 06/14/2018 23  8 - 23 mg/dL Final    CREATININE 06/14/2018 1.25* 0.50 - 0.90 mg/dL Final    GFR Non- 06/14/2018 43.7* >60 Final    Comment: >60 mL/min/1.73m2 EGFR, calc. for ages 25 and older using the  MDRD formula (not corrected for weight), is valid for stable  renal function.  GFR  06/14/2018 52.8* >60 Final    Comment: >60 mL/min/1.73m2 EGFR, calc. for ages 25 and older using the  MDRD formula (not corrected for weight), is valid for stable  renal function.  Calcium 06/14/2018 10.3* 8.6 - 10.2 mg/dL Final    WBC 06/14/2018 8.7  4.8 - 10.8 K/uL Final    RBC 06/14/2018 3.84* 4.20 - 5.40 M/uL Final    Hemoglobin 06/14/2018 11.3* 12.0 - 16.0 g/dL Final    Hematocrit 06/14/2018 32.4* 37.0 - 47.0 % Final    MCV 06/14/2018 84.4  82.0 - 100.0 fL Final    MCH 06/14/2018 29.4  27.0 - 31.3 pg Final    MCHC 06/14/2018 34.8  33.0 - 37.0 % Final    RDW 06/14/2018 13.8  11.5 - 14.5 % Final    Platelets 86/34/5846 236  130 - 400 K/uL Final    Urine Culture, Routine 06/14/2018    Final                    Value:<50,000 CFU/ml of mixed tory  Multiple organisms isolated, no predominance. Culture  indicates probable contamination. Please review colony count  and clinical indications to determine if a repeat culture is  necessary. No further workup to be done.  WBC, UA 06/14/2018 0-2  0 - 5 /HPF Final    RBC, UA 06/14/2018 0-2  0 - 2 /HPF Final    Epi Cells 06/14/2018 3-5  /HPF Final    Bacteria, UA 06/14/2018 Few  /HPF Final       HPI:    Diabetes   She presents for her follow-up diabetic visit. She has type 2 diabetes mellitus. Her disease course has been fluctuating. reflux disease)     Hemiparesis, left ([facility] 2013    entered Ass[facility] ([facility])    History of seizures     History of type C viral hepatitis     HTN (hypertension)     Hyperlipidemia     Impaired mobility and activities of daily living     Mediastinal lymphadenopathy     Tani An    Metabolic syndrome     Neurogenic urinary incontinence 2013    Neuropathy in diabetes ([facility]     Obesity (BMI 30-39. 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 Yanna Kyle      x1 Dr. Juanpablo Dale, Dr Vivian Cross 2018    HYSTERECTOMY, TOTAL ABDOMINAL      one ovary intact, Dr Odalys Wills, menorrhagia    NH TOTAL KNEE ARTHROPLASTY Left 2018    LEFT KNEE TOTAL KNEE ARTHROPLASTY, SHAYNA, NERVE BLOCK performed by Jeovany Zelaya MD at [address] [place] [address] Right     TOTAL KNEE ARTHROPLASTY  16    Dr Zelalem Whittaker Marital status:      Spouse name: N/A    Number of children: 2    Years of education: N/A     Occupational History    disabled      Social History Main Topics    Smoking status: Passive Smoke Exposure - Never Smoker    Smokeless tobacco: Never Used    Alcohol use No    Drug use: No    Sexual activity: Not Currently     Other Topics Concern    Not on file     Social History Narrative    Born in [place], one of 5

## 2018-09-12 ENCOUNTER — OFFICE VISIT (OUTPATIENT)
Dept: CARDIOLOGY CLINIC | Age: 58
End: 2018-09-12
Payer: MEDICARE

## 2018-09-12 VITALS
HEIGHT: 66 IN | WEIGHT: 228 LBS | SYSTOLIC BLOOD PRESSURE: 123 MMHG | RESPIRATION RATE: 16 BRPM | HEART RATE: 82 BPM | OXYGEN SATURATION: 97 % | BODY MASS INDEX: 36.64 KG/M2 | DIASTOLIC BLOOD PRESSURE: 64 MMHG

## 2018-09-12 DIAGNOSIS — I20.9 ANGINA, CLASS III (HCC): ICD-10-CM

## 2018-09-12 DIAGNOSIS — E78.2 MIXED HYPERLIPIDEMIA: Chronic | ICD-10-CM

## 2018-09-12 DIAGNOSIS — I10 ESSENTIAL HYPERTENSION: Chronic | ICD-10-CM

## 2018-09-12 DIAGNOSIS — I25.119 CORONARY ARTERY DISEASE INVOLVING NATIVE CORONARY ARTERY OF NATIVE HEART WITH ANGINA PECTORIS (HCC): Chronic | ICD-10-CM

## 2018-09-12 PROCEDURE — G8427 DOCREV CUR MEDS BY ELIG CLIN: HCPCS | Performed by: INTERNAL MEDICINE

## 2018-09-12 PROCEDURE — 3017F COLORECTAL CA SCREEN DOC REV: CPT | Performed by: INTERNAL MEDICINE

## 2018-09-12 PROCEDURE — 99214 OFFICE O/P EST MOD 30 MIN: CPT | Performed by: INTERNAL MEDICINE

## 2018-09-12 PROCEDURE — G8598 ASA/ANTIPLAT THER USED: HCPCS | Performed by: INTERNAL MEDICINE

## 2018-09-12 PROCEDURE — G8417 CALC BMI ABV UP PARAM F/U: HCPCS | Performed by: INTERNAL MEDICINE

## 2018-09-12 PROCEDURE — 1036F TOBACCO NON-USER: CPT | Performed by: INTERNAL MEDICINE

## 2018-09-12 RX ORDER — ISOSORBIDE MONONITRATE 30 MG/1
30 TABLET, EXTENDED RELEASE ORAL DAILY
Qty: 30 TABLET | Refills: 3 | Status: SHIPPED | OUTPATIENT
Start: 2018-09-12 | End: 2018-11-28 | Stop reason: SDUPTHER

## 2018-09-12 NOTE — PROGRESS NOTES
[facility] CARDIOLOGY OFFICE FOLLOW-UP      Patient: Hermilo Tompkins  YOB: 1960  MRN: [id number]    Chief Complaint:  Chief Complaint   Patient presents with    Coronary Artery Disease     10 m f/u       Subjective/HPI    The patient is followed in the office chronically for the following problems: CAD, prior PCI mid LAD restenosis and repeat PCI    The last office visit focused on the following: preop for non-cardiac surgery (knee)    Since the last office visit, the patient has developed recurrent angina. Milder than before. Occasionally taking nitro. Usually goes away within 5 min and does not need nitro. Daily three times a day. Past Medical History:   Diagnosis Date    Anxiety     CAD S/P percutaneous coronary angioplasty 2015, 2018    stent per dr Terrie Cristobal    Diabetic nephropathy with proteinuria ([address] 2014    DJD (degenerative joint disease) of knee     Dr Dian West GERD (gastroesophageal reflux disease)     Hemiparesis, left ([address] 2013    entered Assisted Living ([facility])    History of seizures     History of type C viral hepatitis     HTN (hypertension)     Hyperlipidemia     Impaired mobility and activities of daily living     Mediastinal lymphadenopathy 2013    Bernarda Agrawal    Metabolic syndrome     Neurogenic urinary incontinence 2013    Neuropathy in diabetes ([address]     Obesity (BMI 30-39. 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 ([address]     Type 2 diabetes mellitus with renal manifestations, controlled ([address] 2015    Insulin dependent, Dr Faby Stark Urinary incontinence due to cognitive impairment 2013    Vitamin D deficiency 2014       Past Surgical History:   Procedure EVERY NIGHT AT BEDTIME AS NEEDED FOR INSOMNIA 180 tablet 1    omeprazole (PRILOSEC) 20 MG delayed release capsule TAKE 1 CAPSULE BY MOUTH ONCE DAILY AS NEEDED FOR HEARTBURN 90 capsule 1    ASPIRIN LOW DOSE 81 MG EC tablet TAKE (1) TABLET BY MOUTH DAILY 90 tablet 1    atorvastatin (LIPITOR) 40 MG tablet TAKE (1) TABLET BY MOUTH NIGHTLY 90 tablet 1    nitroGLYCERIN (NITROSTAT) 0.4 MG SL tablet DISSOLVE 1 TAB UNDER THE TONGUE AS NEEDED FOR CHEST PAIN EVERY 5 MINUTES UP TO 3 TIMES.  IF NO RELIEF CALL 911. 75 tablet 1    nystatin (MYCOSTATIN) 278860 UNIT/GM powder APPLY TO ABDOMINAL FOLDS EVERY 12 HOURS AS NEEDED 60 g 2    TRADJENTA 5 MG tablet TK 1 T PO ONCE D UTD 30 tablet 5    sertraline (ZOLOFT) 50 MG tablet TAKE (1) TABLET BY MOUTH DAILY 30 tablet 5    cyanocobalamin ([employer] VITAMIN B12) 1000 MCG tablet Take 1 tablet by mouth daily 30 tablet 5    MAPAP 500 MG tablet TAKE 1 TABLET BY MOUTH EVERY 6 HOURS AS NEEDED FOR PAIN OR FEVER 120 tablet 3    clopidogrel (PLAVIX) 75 MG tablet Take 75 mg by mouth daily      insulin glargine (BASAGLAR KWIKPEN) 100 UNIT/ML injection pen Inject 28 Units into the skin 2 times daily 6 pen 2    carvedilol (COREG) 6.25 MG tablet TAKE (1) TABLET BY MOUTH TWICE DAILY WITH MEALS 180 tablet 1    FREESTYLE LITE strip TEST every 6 hours 100 each 3    FREESTYLE LANCETS MISC Test blood sugar 4 times daily (AC and HS) 100 each 3    BD AUTOSHIELD DUO 30G X 5 MM MISC USE AS DIRECTED 2 TIMES DAILY FOR USE WITH LANTUS SOLOSTAR, 100 each 3    ARIPiprazole (ABILIFY) 10 MG tablet TAKE (1) TABLET BY MOUTH DAILY 90 tablet 1    Blood Pressure KIT 1 Device by Does not apply route daily 1 kit 0    Cholecalciferol (VITAMIN D3) 1000 units TABS TAKE (1) TABLET BY MOUTH DAILY 90 tablet 10    Alcohol Swabs (EASY TOUCH ALCOHOL PREP MEDIUM) 70 % PADS USE AS DIRECTED THREE TIMES A  each 3    Blood Glucose Monitoring Suppl (FREESTYLE LITE) CORETTA use as directed  0     No current 28.9 06/28/2018    MCV 83.0 06/28/2018    MCH 28.6 06/28/2018    MCHC 34.4 06/28/2018    RDW 13.9 06/28/2018     06/28/2018    MPV 9.3 08/07/2015     Lipids:  Lab Results   Component Value Date    CHOL 104 06/28/2018    CHOL 118 02/24/2018    CHOL 139 02/06/2018     Lab Results   Component Value Date    TRIG 85 06/28/2018    TRIG 141 02/24/2018    TRIG 156 02/06/2018     Lab Results   Component Value Date    HDL 42 06/28/2018    HDL 34 (L) 02/24/2018    HDL 43 02/06/2018     Lab Results   Component Value Date    LDLCALC 45 06/28/2018    LDLCALC 56 02/24/2018    LDLCALC 65 02/06/2018     Lab Results   Component Value Date    LABVLDL 59.0 05/04/2013    VLDL 43 01/30/2017     Lab Results   Component Value Date    CHOLHDLRATIO 4.32 01/30/2017     CMP:    Lab Results   Component Value Date     07/05/2018    K 4.6 07/05/2018    K 4.9 06/22/2018     07/05/2018    CO2 18 07/05/2018    BUN 29 07/05/2018    CREATININE 1.4 07/05/2018    GFRAA 43.1 06/28/2018    LABGLOM 35.6 06/28/2018    GLUCOSE 169 07/05/2018    PROT 7.2 04/24/2018    LABALBU 4.2 04/24/2018    CALCIUM 9.2 07/05/2018    BILITOT 0.6 04/24/2018    ALKPHOS 109 04/24/2018    AST 18 04/24/2018    ALT 18 04/24/2018     BMP:    Lab Results   Component Value Date     07/05/2018    K 4.6 07/05/2018    K 4.9 06/22/2018     07/05/2018    CO2 18 07/05/2018    BUN 29 07/05/2018    LABALBU 4.2 04/24/2018    CREATININE 1.4 07/05/2018    CALCIUM 9.2 07/05/2018    GFRAA 43.1 06/28/2018    LABGLOM 35.6 06/28/2018    GLUCOSE 169 07/05/2018     Magnesium:    Lab Results   Component Value Date    MG 1.9 02/24/2018     TSH:  Lab Results   Component Value Date    TSH 0.574 10/08/2017       Patient Active Problem List   Diagnosis    Coronary artery disease involving native heart with angina pectoris (HCC)    Schizophrenia, paranoid, chronic    Metabolic syndrome    Obesity    Vitamin B 12 deficiency    Cerebral microvascular disease    Mixed hyperlipidemia    Other hammer toe (acquired)    Vitamin D insufficiency    Incontinence of urine    Diabetic nephropathy with proteinuria (HCC)    HTN (hypertension)    History of type C viral hepatitis    Urinary incontinence due to cognitive impairment    Status post total knee replacement, right    History of seizures    Renal insufficiency    Stented coronary artery-plan is to stay on Plavix indefinately per Dr Mickey Hood    Controlled type 2 diabetes mellitus with diabetic neuropathy, with long-term current use of insulin (HCC)    Hemiparesis, left (HCC)    Dyspnea on exertion    Angina, class III (HCC)    Acute kidney injury ([address]    [facility]uropathy [facility] diabetes [facility]    Status post total left knee replacement       Assessment/Plan:    1. Coronary artery disease involving native coronary artery of native heart with angina pectoris (HCC)  Recurrent angina class III, will try meds first as had recent < 1 yr PCI of the LAD and has severe renal insufficiency. - isosorbide mononitrate (IMDUR) 30 MG extended release tablet; Take 1 tablet by mouth daily  Dispense: 30 tablet; Refill: 3    2. Mixed hyperlipidemia  On statin    3. Essential hypertension  controlled    4. Angina, class III (HCC)  Add imdur  - isosorbide mononitrate (IMDUR) 30 MG extended release tablet; Take 1 tablet by mouth daily  Dispense: 30 tablet; Refill: 3       Counseling:  Heart Healthy Lifestyle, Improve BMI, Low Salt Diet and Walk Daily    Return in about 4 weeks (around 10/10/2018) for followup cv disease. Electronically signed by   Flor Cruz.  Qasim Oliver MD [facility] Director of Cardiology Services and Cardiac Catheterization Laboratory  [facility], [place]    on 9/12/2018 at 11:23 AM

## 2018-09-17 RX ORDER — ARIPIPRAZOLE 10 MG/1
TABLET ORAL
Qty: 90 TABLET | Refills: 1 | Status: SHIPPED | OUTPATIENT
Start: 2018-09-17 | End: 2018-10-12

## 2018-09-24 ENCOUNTER — CARE COORDINATION (OUTPATIENT)
Dept: CARE COORDINATION | Age: 58
End: 2018-09-24

## 2018-09-24 NOTE — CARE COORDINATION
Received call from patient reporting she has not heard anything regarding her Freestyle Yang device. Informed patient that I would contact [employer] to check status. Call placed to [facility] dedicated [facility] line. Informed that patient referral not processed until confirmation received by patient. Informed that it may take up to 14 business days for [employer] to contact patient. Recommended to have patient contact [address] by phone. Follow-up call placed to patient. Provided an update on what was communicated to me by [address] representative. Provided patient with phone number to [address] and instructions to call.

## 2018-10-01 RX ORDER — INSULIN GLARGINE 100 [IU]/ML
INJECTION, SOLUTION SUBCUTANEOUS
Qty: 15 ML | Refills: 5 | Status: SHIPPED | OUTPATIENT
Start: 2018-10-01 | End: 2019-01-07

## 2018-10-01 NOTE — TELEPHONE ENCOUNTER
PATIENT LAST SEEN 9/11/18  PLEASE APPROVE OR DENY.        Future Appointments  Date Time Provider Aminata Cortes   10/8/2018 9:00 AM SCHEDULE, NUTRITIONIST [address] [id number]   10/12/2018 9:15 AM Bibi Stovall MD [facility]   10/17/2018 8:30 AM Blanca Rider MD [address] [place] [address]   7/25/2019 10:00 AM Era Marquez MD [facility]

## 2018-10-08 ENCOUNTER — HOSPITAL ENCOUNTER (OUTPATIENT)
Dept: NUTRITION | Age: 58
Discharge: HOME | End: 2018-10-08
Payer: MEDICARE

## 2018-10-08 PROCEDURE — 97802 MEDICAL NUTRITION INDIV IN: CPT

## 2018-10-08 NOTE — PROGRESS NOTES
OUTPATIENT NUTRITION COUNSELING       Rush Khan is a 59 y.o.  female     Reason for Counseling: controlled Type 2 DM with long term insulin use    Subjective/Current Data:  Pt states she has had DM type 2 x past 20 years, now recently developing higher BS levels > 200 mg/dL. She is now taking new oral meds for DM as well as continued insulin, which is adjusted frequently. Pt also reports recent weight gain of around 10 lbs x 6 weeks. She has had no major changes to her diet, but states it is often hard to control what she eats because her daughter does all of the cooking. Pt walks frequently for exercise. She has attended DM management classes in the past and is knowledgeable of carb counting. She aims for 4 carbs at each meal. She limits sweets and only drinks Diet pop. Her meals are typically healthy and well balanced. She may snack on fruit, ie: grapes. Objective Data:    Past Medical History:  Past Medical History:   Diagnosis Date    Anxiety     CAD S/P percutaneous coronary angioplasty 2015, 2018    stent per dr Shoaib Paul    Diabetic nephropathy with [facility]uria ([facility] 2014    DJD (degenerative joint disease) of knee     Dr Michelle Limon GERD (gastroesophageal reflux disease)     Hemiparesis, left ([address] 2013    entered Assisted Living ([facility])    History of seizures     History of type C viral hepatitis     HTN (hypertension)     Hyperlipidemia     Impaired mobility and activities of daily living     Mediastinal lymphadenopathy 2013    Dr Ramirez Waters, Reford Rho    Metabolic syndrome     Neurogenic urinary incontinence 2013    Neuropathy in diabetes ([facility]     Obesity (BMI 30-39. 9)     Recurrent UTI     Renal insufficiency     S/P colonoscopy 2014    [facility], focal active colitis    Schizophrenia, paranoid, chronic ([address]     [facility]   [employer] vessel disease, cerebrovascular 2013    Status post total knee replacement, right     Traumatic amputation of third toe of right

## 2018-10-12 ENCOUNTER — OFFICE VISIT (OUTPATIENT)
Dept: INTERNAL MEDICINE CLINIC | Age: 58
End: 2018-10-12
Payer: MEDICARE

## 2018-10-12 ENCOUNTER — CARE COORDINATION (OUTPATIENT)
Dept: CARE COORDINATION | Age: 58
End: 2018-10-12

## 2018-10-12 VITALS
HEART RATE: 68 BPM | TEMPERATURE: 97.2 F | BODY MASS INDEX: 38.25 KG/M2 | SYSTOLIC BLOOD PRESSURE: 120 MMHG | WEIGHT: 238 LBS | HEIGHT: 66 IN | OXYGEN SATURATION: 95 % | DIASTOLIC BLOOD PRESSURE: 70 MMHG

## 2018-10-12 DIAGNOSIS — E11.8 CONTROLLED TYPE 2 DIABETES MELLITUS WITH COMPLICATION, WITH LONG-TERM CURRENT USE OF INSULIN (HCC): Primary | ICD-10-CM

## 2018-10-12 DIAGNOSIS — Z79.4 CONTROLLED TYPE 2 DIABETES MELLITUS WITH COMPLICATION, WITH LONG-TERM CURRENT USE OF INSULIN (HCC): Primary | ICD-10-CM

## 2018-10-12 DIAGNOSIS — Z23 NEED FOR INFLUENZA VACCINATION: ICD-10-CM

## 2018-10-12 DIAGNOSIS — F20.0 SCHIZOPHRENIA, PARANOID, CHRONIC (HCC): ICD-10-CM

## 2018-10-12 PROCEDURE — 90688 IIV4 VACCINE SPLT 0.5 ML IM: CPT | Performed by: INTERNAL MEDICINE

## 2018-10-12 PROCEDURE — G8598 ASA/ANTIPLAT THER USED: HCPCS | Performed by: INTERNAL MEDICINE

## 2018-10-12 PROCEDURE — G8427 DOCREV CUR MEDS BY ELIG CLIN: HCPCS | Performed by: INTERNAL MEDICINE

## 2018-10-12 PROCEDURE — G8417 CALC BMI ABV UP PARAM F/U: HCPCS | Performed by: INTERNAL MEDICINE

## 2018-10-12 PROCEDURE — 2022F DILAT RTA XM EVC RTNOPTHY: CPT | Performed by: INTERNAL MEDICINE

## 2018-10-12 PROCEDURE — G0008 ADMIN INFLUENZA VIRUS VAC: HCPCS | Performed by: INTERNAL MEDICINE

## 2018-10-12 PROCEDURE — 3017F COLORECTAL CA SCREEN DOC REV: CPT | Performed by: INTERNAL MEDICINE

## 2018-10-12 PROCEDURE — 99214 OFFICE O/P EST MOD 30 MIN: CPT | Performed by: INTERNAL MEDICINE

## 2018-10-12 PROCEDURE — 3044F HG A1C LEVEL LT 7.0%: CPT | Performed by: INTERNAL MEDICINE

## 2018-10-12 PROCEDURE — G8482 FLU IMMUNIZE ORDER/ADMIN: HCPCS | Performed by: INTERNAL MEDICINE

## 2018-10-12 PROCEDURE — 1036F TOBACCO NON-USER: CPT | Performed by: INTERNAL MEDICINE

## 2018-10-12 RX ORDER — ARIPIPRAZOLE 5 MG/1
TABLET ORAL
Qty: 90 TABLET | Refills: 0 | Status: SHIPPED | OUTPATIENT
Start: 2018-10-12 | End: 2018-11-28 | Stop reason: SDUPTHER

## 2018-10-12 ASSESSMENT — ENCOUNTER SYMPTOMS
CHEST TIGHTNESS: 0
TROUBLE SWALLOWING: 0
BLURRED VISION: 0
SHORTNESS OF BREATH: 0
EYE PAIN: 0
COUGH: 0
CHOKING: 0
BLOOD IN STOOL: 0
ABDOMINAL PAIN: 0

## 2018-10-12 NOTE — PROGRESS NOTES
hypoglycemia include no confusion, dizziness, nervousness/anxiousness or speech difficulty. Associated symptoms include foot paresthesias, polydipsia, polyphagia and polyuria. Pertinent negatives for diabetes include no blurred vision, no chest pain, no fatigue, no foot ulcerations, no weakness and no weight loss. There are no hypoglycemic complications. Diabetic complications include a CVA, nephropathy and peripheral neuropathy. Risk factors for coronary artery disease include diabetes mellitus, hypertension, obesity, sedentary lifestyle and post-menopausal. Current diabetic treatment includes intensive insulin program and oral agent (monotherapy). She is compliant with treatment most of the time. Her weight is increasing steadily. She is following a generally healthy diet. Meal planning includes calorie counting and carbohydrate counting. She has had a previous visit with a dietitian. She participates in exercise weekly (walking). Her breakfast blood glucose range is generally >200 mg/dl. An ACE inhibitor/angiotensin II receptor blocker is being taken. She sees a podiatrist.Eye exam is not current. More detail above in the chiefcomplaint(s), interim history and below in the review of systems. Past Medical History:   Diagnosis Date    Anxiety     CAD S/P percutaneous coronary angioplasty 2015, 2018    stent per dr Ajay Biggs    Diabetic nephropathy with proteinuria ([facility] 2014    DJD (degenerative joint disease) of knee     Dr Pedro Segura GERD (gastroesophageal reflux disease)     Hemiparesis, left ([facility] 2013    entered Assisted Living ([facility])    History of seizures     History of type C viral hepatitis     HTN (hypertension)     Hyperlipidemia     Impaired mobility and activities of daily living     Mediastinal lymphadenopathy 2013    Liudmila Martinez    Metabolic syndrome     Neurogenic urinary incontinence 2013    Neuropathy in diabetes ([facility]     Obesity (BMI 30-39. 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 Meron Julio      x1 Dr. Jazlyn Preston, Dr Unruly Whitmore 2018    HYSTERECTOMY, TOTAL ABDOMINAL      one ovary intact, Dr Heath Pantoja, menorrhagia    MA TOTAL KNEE ARTHROPLASTY Left 2018    LEFT KNEE TOTAL KNEE ARTHROPLASTY, SHAYNA, NERVE BLOCK performed by Carine Cai MD at [address] [place] [address] Right     TOTAL KNEE ARTHROPLASTY  16    Dr Xiao Moe Marital status:      Spouse name: N/A    Number of children: 2    Years of education: N/A     Occupational History    disabled      Social History Main Topics    Smoking status: Passive Smoke Exposure - Never Smoker    Smokeless tobacco: Never Used    Alcohol use No    Drug use: No    Sexual activity: Not Currently     Other Topics Concern    Not on file     Social History Narrative    Born in [place], one of 5    Keeps in touch with twin sister Rubio Esquivel to Sharon Amaya, , 2 children    Worked at [employer] due to mental illness    Lived at [employer], was discharged, returned to independent living in 2017 and has adjusted well    One son and one daughter, keep in touch       Family History   Problem Relation Age of Onset    Cancer Mother 76        survived   [facility] Hypertension Father     Diabetes Temp: 97.2 °F (36.2 °C)   TempSrc: Tympanic   SpO2: 95%   Weight: 238 lb (108 kg)   Height: 5' 6\" (1.676 m)       Physical Exam   Constitutional: She is oriented to person, place, and time. No distress. Obese, age appropriate, well groomed     HENT:   Head: Atraumatic. Eyes: Conjunctivae and EOM are normal. No scleral icterus. Neck: Neck supple. No JVD present. Cardiovascular: Regular rhythm and normal heart sounds. Exam reveals no gallop. Pulmonary/Chest: Effort normal and breath sounds normal.   Abdominal: Soft. She exhibits no distension. There is no tenderness. Exam limited due to abdominal adiposity      Musculoskeletal: Normal range of motion. Neurological: She is alert and oriented to person, place, and time. Coordination normal.   Skin: Skin is warm. Psychiatric: Her speech is normal. Her mood appears not anxious. Her affect is not labile. She is not slowed and not withdrawn. She does not exhibit a depressed mood. She exhibits normal recent memory and normal remote memory. Good insight and motivation  She is attentive. Assessment:    Michelle was seen today for diabetes, weight gain, schizophrenia and immunizations.     Diagnoses and all orders for this visit:    Controlled type 2 diabetes mellitus with complication, with long-term current use of insulin ([facility]              Continue current medication management, improve diet, monitor weight at home, close follow-up                  Schizophrenia, paranoid, chronic              Stable, decrease Abilify to 5 mg daily ( to possibly help decrease food cravings) start counseling as needed  -     [address] [facility])    Need for influenza vaccination  -     INFLUENZA, QUADV, 3 YRS AND OLDER, IM, MDV, 0.5ML ([facility])    Other orders  -     ARIPiprazole (ABILIFY) 5 MG tablet; TAKE (1) TABLET BY MOUTH DAILY        Plan:    Reviewed with the patient (/and caregiver if present): current health status,

## 2018-10-12 NOTE — CARE COORDINATION
Call placed to patient to schedule appointment with [facility] provider to establish care. Care Coordination call placed to patient. Unable to reach patient by phone. Message left regarding the purpose of the call. Provided cell phone number and requested call back. Patient returned my call.  Scheduled patient an appointment with [facility] Provider, Ailyn Butler PsyD to establish care for Tuesday, October 16 th at 8:30 am.
Home Health add PT.)  Meals on Wheels:  Completed  Physical Therapy:  Completed (Comment: Home Health PT discharged. Starting outpatient PT.)  Registered Dietician:  Completed  Zone Management Tools: In Process (Comment: Diabetes Zones)         Goals Addressed             Most Recent     Conditions and Symptoms   On track (10/12/2018)             I will notify my provider of any barriers to my plan of care. I will follow my Zone Management tool to seek urgent or emergent care. I will notify my provider of any symptoms that indicate a worsening of my condition. Barriers: overwhelmed by complexity of regimen  Plan for overcoming my barriers: N/A  Confidence: 7/10  Anticipated Goal Completion Date: 12/30/2018              Prior to Admission medications    Medication Sig Start Date End Date Taking? Authorizing Provider   ARIPiprazole (ABILIFY) 5 MG tablet TAKE (1) TABLET BY MOUTH DAILY 10/12/18   Shaylee Morocho MD   BASAGLAR KWIKPEN 100 UNIT/ML injection pen INJECT 20 UNITS SUBCUTANEOUSLY TWICE DAILY 10/1/18   Eugenio Barry MD   NOVOFINE AUTOCOVER 30G X 8 MM MISC Inject 1 each into the skin 3 times daily 9/28/18   Shaylee Morocho MD   insulin aspart (NOVOLOG FLEXPEN) 100 UNIT/ML injection pen Inject 18 units subcut tid before each meal 9/17/18   Shaylee Morocho MD   isosorbide mononitrate (IMDUR) 30 MG extended release tablet Take 1 tablet by mouth daily 9/12/18   Fatoumata Laird MD   pregabalin (LYRICA) 75 MG capsule Take 1 capsule by mouth 2 times daily for 61 days. . 9/11/18 11/11/18  Shaylee Morocho MD   traZODone (DESYREL) 50 MG tablet TAKE 1-2 TABLETS BY MOUTH EVERY NIGHT AT BEDTIME AS NEEDED FOR INSOMNIA 8/31/18   Shaylee Morocho MD   omeprazole (PRILOSEC) 20 MG delayed release capsule TAKE 1 CAPSULE BY MOUTH ONCE DAILY AS NEEDED FOR HEARTBURN 8/31/18   Lilliam Hyman MD   ASPIRIN LOW DOSE 81 MG EC tablet TAKE (1) TABLET BY MOUTH DAILY 8/31/18   Shaylee Morocho MD   atorvastatin (LIPITOR) 40 MG

## 2018-10-16 ENCOUNTER — OFFICE VISIT (OUTPATIENT)
Dept: BEHAVIORAL/MENTAL HEALTH CLINIC | Age: 58
End: 2018-10-16
Payer: MEDICARE

## 2018-10-16 DIAGNOSIS — F20.9 SCHIZOPHRENIA, CHRONIC CONDITION (HCC): ICD-10-CM

## 2018-10-16 DIAGNOSIS — F32.89 OTHER DEPRESSION: Primary | ICD-10-CM

## 2018-10-16 PROCEDURE — 90791 PSYCH DIAGNOSTIC EVALUATION: CPT | Performed by: PSYCHOLOGIST

## 2018-10-16 ASSESSMENT — PATIENT HEALTH QUESTIONNAIRE - PHQ9
1. LITTLE INTEREST OR PLEASURE IN DOING THINGS: 2
6. FEELING BAD ABOUT YOURSELF - OR THAT YOU ARE A FAILURE OR HAVE LET YOURSELF OR YOUR FAMILY DOWN: 0
7. TROUBLE CONCENTRATING ON THINGS, SUCH AS READING THE NEWSPAPER OR WATCHING TELEVISION: 2
10. IF YOU CHECKED OFF ANY PROBLEMS, HOW DIFFICULT HAVE THESE PROBLEMS MADE IT FOR YOU TO DO YOUR WORK, TAKE CARE OF THINGS AT HOME, OR GET ALONG WITH OTHER PEOPLE: 1
5. POOR APPETITE OR OVEREATING: 2
SUM OF ALL RESPONSES TO PHQ QUESTIONS 1-9: 14
2. FEELING DOWN, DEPRESSED OR HOPELESS: 1
9. THOUGHTS THAT YOU WOULD BE BETTER OFF DEAD, OR OF HURTING YOURSELF: 0
4. FEELING TIRED OR HAVING LITTLE ENERGY: 3
SUM OF ALL RESPONSES TO PHQ QUESTIONS 1-9: 14
SUM OF ALL RESPONSES TO PHQ9 QUESTIONS 1 & 2: 3
3. TROUBLE FALLING OR STAYING ASLEEP: 3
8. MOVING OR SPEAKING SO SLOWLY THAT OTHER PEOPLE COULD HAVE NOTICED. OR THE OPPOSITE, BEING SO FIGETY OR RESTLESS THAT YOU HAVE BEEN MOVING AROUND A LOT MORE THAN USUAL: 1

## 2018-10-16 NOTE — PROGRESS NOTES
3    clopidogrel (PLAVIX) 75 MG tablet Take 75 mg by mouth daily      insulin glargine (BASAGLAR KWIKPEN) 100 UNIT/ML injection pen Inject 28 Units into the skin 2 times daily 6 pen 2    carvedilol (COREG) 6.25 MG tablet TAKE (1) TABLET BY MOUTH TWICE DAILY WITH MEALS 180 tablet 1    FREESTYLE LITE strip TEST every 6 hours 100 each 3    FREESTYLE LANCETS MISC Test blood sugar 4 times daily (AC and HS) 100 each 3    BD AUTOSHIELD DUO 30G X 5 MM MISC USE AS DIRECTED 2 TIMES DAILY FOR USE WITH LANTUS SOLOSTAR, 100 each 3    Blood Pressure KIT 1 Device by Does not apply route daily 1 kit 0    Cholecalciferol (VITAMIN D3) 1000 units TABS TAKE (1) TABLET BY MOUTH DAILY 90 tablet 10    Alcohol Swabs (EASY TOUCH ALCOHOL PREP MEDIUM) 70 % PADS USE AS DIRECTED THREE TIMES A  each 3    Blood Glucose Monitoring Suppl (FREESTYLE LITE) CORETTA use as directed  0     No current facility-administered medications for this visit.         Social History:   Social History     Social History    Marital status:      Spouse name: N/A    Number of children: 2    Years of education: N/A     Occupational History    disabled      Social History Main Topics    Smoking status: Passive Smoke Exposure - Never Smoker    Smokeless tobacco: Never Used    Alcohol use No    Drug use: No    Sexual activity: Not Currently     Other Topics Concern    Not on file     Social History Narrative    Born in [place], one of 5    Keeps in touch with twin sister Krissy Szymanski, , 2 children    Worked at [employer] due to mental illness    Lived at [employer], was discharged, returned to independent living in 2017 and has adjusted well    One son and one daughter, keep in touch         Family History:   Family History   Problem Relation Age of Onset    Cancer Mother 76        survived   [employer] Hypertension Father     Diabetes Sister     Mental Illness Sister        TOBACCO:   reports Name band;

## 2018-10-16 NOTE — Clinical Note
Pt reported she is generally doing ok, but struggling somewhat with stress in home. I will plan to check in with her in a few weeks and provide strategies for managing stress at home.

## 2018-10-22 ENCOUNTER — CARE COORDINATION (OUTPATIENT)
Dept: CARE COORDINATION | Age: 58
End: 2018-10-22

## 2018-10-24 ENCOUNTER — CARE COORDINATION (OUTPATIENT)
Dept: CARE COORDINATION | Age: 58
End: 2018-10-24

## 2018-10-24 NOTE — CARE COORDINATION
Patient called requesting fax [facility] paperwork to [facility] for [facility]). Patient states that the 20% is not covered by insurance.

## 2018-10-30 RX ORDER — NITROFURANTOIN MACROCRYSTALS 50 MG/1
50 CAPSULE ORAL NIGHTLY
Qty: 90 CAPSULE | Refills: 11 | OUTPATIENT
Start: 2018-10-30 | End: 2018-11-06

## 2018-10-31 ENCOUNTER — CARE COORDINATION (OUTPATIENT)
Dept: CARE COORDINATION | Age: 58
End: 2018-10-31

## 2018-10-31 DIAGNOSIS — Z79.4 TYPE 2 DIABETES MELLITUS WITH COMPLICATION, WITH LONG-TERM CURRENT USE OF INSULIN (HCC): Primary | ICD-10-CM

## 2018-10-31 DIAGNOSIS — E11.8 TYPE 2 DIABETES MELLITUS WITH COMPLICATION, WITH LONG-TERM CURRENT USE OF INSULIN (HCC): Primary | ICD-10-CM

## 2018-10-31 RX ORDER — FLASH GLUCOSE SENSOR
KIT MISCELLANEOUS
Qty: 3 EACH | Refills: 3 | Status: SHIPPED | OUTPATIENT
Start: 2018-10-31 | End: 2018-11-13 | Stop reason: SDUPTHER

## 2018-10-31 RX ORDER — FLASH GLUCOSE SENSOR
1 KIT MISCELLANEOUS CONTINUOUS
Qty: 1 DEVICE | Refills: 0 | Status: SHIPPED | OUTPATIENT
Start: 2018-10-31 | End: 2018-11-13 | Stop reason: SDUPTHER

## 2018-11-05 ENCOUNTER — CARE COORDINATION (OUTPATIENT)
Dept: CARE COORDINATION | Age: 58
End: 2018-11-05

## 2018-11-07 ENCOUNTER — OFFICE VISIT (OUTPATIENT)
Dept: CARDIOLOGY CLINIC | Age: 58
End: 2018-11-07
Payer: MEDICARE

## 2018-11-07 VITALS
HEART RATE: 84 BPM | SYSTOLIC BLOOD PRESSURE: 128 MMHG | OXYGEN SATURATION: 93 % | BODY MASS INDEX: 38.25 KG/M2 | WEIGHT: 238 LBS | HEIGHT: 66 IN | DIASTOLIC BLOOD PRESSURE: 70 MMHG | RESPIRATION RATE: 16 BRPM

## 2018-11-07 DIAGNOSIS — R06.02 SHORTNESS OF BREATH: Primary | ICD-10-CM

## 2018-11-07 DIAGNOSIS — I25.119 CORONARY ARTERY DISEASE INVOLVING NATIVE CORONARY ARTERY OF NATIVE HEART WITH ANGINA PECTORIS (HCC): Chronic | ICD-10-CM

## 2018-11-07 DIAGNOSIS — I10 ESSENTIAL HYPERTENSION: Chronic | ICD-10-CM

## 2018-11-07 DIAGNOSIS — E78.2 MIXED HYPERLIPIDEMIA: Chronic | ICD-10-CM

## 2018-11-07 DIAGNOSIS — I20.9 ANGINA, CLASS III (HCC): ICD-10-CM

## 2018-11-07 PROCEDURE — G8427 DOCREV CUR MEDS BY ELIG CLIN: HCPCS | Performed by: INTERNAL MEDICINE

## 2018-11-07 PROCEDURE — 1036F TOBACCO NON-USER: CPT | Performed by: INTERNAL MEDICINE

## 2018-11-07 PROCEDURE — G8482 FLU IMMUNIZE ORDER/ADMIN: HCPCS | Performed by: INTERNAL MEDICINE

## 2018-11-07 PROCEDURE — G8417 CALC BMI ABV UP PARAM F/U: HCPCS | Performed by: INTERNAL MEDICINE

## 2018-11-07 PROCEDURE — G8598 ASA/ANTIPLAT THER USED: HCPCS | Performed by: INTERNAL MEDICINE

## 2018-11-07 PROCEDURE — 99214 OFFICE O/P EST MOD 30 MIN: CPT | Performed by: INTERNAL MEDICINE

## 2018-11-07 PROCEDURE — 3017F COLORECTAL CA SCREEN DOC REV: CPT | Performed by: INTERNAL MEDICINE

## 2018-11-07 NOTE — PROGRESS NOTES
30G X 8 MM MISC Inject 1 each into the skin 3 times daily 100 each 3    insulin aspart (NOVOLOG FLEXPEN) 100 UNIT/ML injection pen Inject 18 units subcut tid before each meal 3 mL 2    isosorbide mononitrate (IMDUR) 30 MG extended release tablet Take 1 tablet by mouth daily 30 tablet 3    pregabalin (LYRICA) 75 MG capsule Take 1 capsule by mouth 2 times daily for 61 days. . 60 capsule 1    traZODone (DESYREL) 50 MG tablet TAKE 1-2 TABLETS BY MOUTH EVERY NIGHT AT BEDTIME AS NEEDED FOR INSOMNIA 180 tablet 1    omeprazole (PRILOSEC) 20 MG delayed release capsule TAKE 1 CAPSULE BY MOUTH ONCE DAILY AS NEEDED FOR HEARTBURN 90 capsule 1    ASPIRIN LOW DOSE 81 MG EC tablet TAKE (1) TABLET BY MOUTH DAILY 90 tablet 1    atorvastatin (LIPITOR) 40 MG tablet TAKE (1) TABLET BY MOUTH NIGHTLY 90 tablet 1    nitroGLYCERIN (NITROSTAT) 0.4 MG SL tablet DISSOLVE 1 TAB UNDER THE TONGUE AS NEEDED FOR CHEST PAIN EVERY 5 MINUTES UP TO 3 TIMES.  IF NO RELIEF CALL 911. 75 tablet 1    nystatin (MYCOSTATIN) 303048 UNIT/GM powder APPLY TO ABDOMINAL FOLDS EVERY 12 HOURS AS NEEDED 60 g 2    TRADJENTA 5 MG tablet TK 1 T PO ONCE D UTD 30 tablet 5    sertraline (ZOLOFT) 50 MG tablet TAKE (1) TABLET BY MOUTH DAILY 30 tablet 5    cyanocobalamin ([employer] VITAMIN B12) 1000 MCG tablet Take 1 tablet by mouth daily 30 tablet 5    MAPAP 500 MG tablet TAKE 1 TABLET BY MOUTH EVERY 6 HOURS AS NEEDED FOR PAIN OR FEVER 120 tablet 3    clopidogrel (PLAVIX) 75 MG tablet Take 75 mg by mouth daily      insulin glargine (BASAGLAR KWIKPEN) 100 UNIT/ML injection pen Inject 28 Units into the skin 2 times daily 6 pen 2    carvedilol (COREG) 6.25 MG tablet TAKE (1) TABLET BY MOUTH TWICE DAILY WITH MEALS 180 tablet 1    FREESTYLE LITE strip TEST every 6 hours 100 each 3    FREESTYLE LANCETS MISC Test blood sugar 4 times daily (AC and HS) 100 each 3    BD AUTOSHIELD DUO 30G X 5 MM MISC USE AS DIRECTED 2 TIMES DAILY FOR USE WITH LANTUS SOLOSTAR, 100 each 3   

## 2018-11-09 ENCOUNTER — HOSPITAL ENCOUNTER (OUTPATIENT)
Dept: CARDIAC CATH/INVASIVE PROCEDURES | Age: 58
Discharge: HOME | End: 2018-11-09
Attending: INTERNAL MEDICINE | Admitting: INTERNAL MEDICINE
Payer: MEDICARE

## 2018-11-09 VITALS
DIASTOLIC BLOOD PRESSURE: 64 MMHG | BODY MASS INDEX: 38.25 KG/M2 | OXYGEN SATURATION: 98 % | WEIGHT: 238 LBS | HEIGHT: 66 IN | HEART RATE: 74 BPM | SYSTOLIC BLOOD PRESSURE: 135 MMHG | RESPIRATION RATE: 15 BRPM

## 2018-11-09 DIAGNOSIS — I10 ESSENTIAL HYPERTENSION: Chronic | ICD-10-CM

## 2018-11-09 DIAGNOSIS — I20.9 ANGINA, CLASS III (HCC): ICD-10-CM

## 2018-11-09 DIAGNOSIS — E78.2 MIXED HYPERLIPIDEMIA: Chronic | ICD-10-CM

## 2018-11-09 DIAGNOSIS — I25.119 CORONARY ARTERY DISEASE INVOLVING NATIVE CORONARY ARTERY OF NATIVE HEART WITH ANGINA PECTORIS (HCC): Chronic | ICD-10-CM

## 2018-11-09 DIAGNOSIS — R06.02 SHORTNESS OF BREATH: ICD-10-CM

## 2018-11-09 LAB
ALBUMIN SERPL-MCNC: 3.2 G/DL (ref 3.9–4.9)
ALP BLD-CCNC: 98 U/L (ref 40–130)
ALT SERPL-CCNC: 12 U/L (ref 0–33)
ANION GAP SERPL CALCULATED.3IONS-SCNC: 11 MEQ/L (ref 7–13)
AST SERPL-CCNC: 12 U/L (ref 0–35)
BILIRUB SERPL-MCNC: <0.2 MG/DL (ref 0–1.2)
BUN BLDV-MCNC: 26 MG/DL (ref 8–23)
CALCIUM SERPL-MCNC: 9.1 MG/DL (ref 8.6–10.2)
CHLORIDE BLD-SCNC: 103 MEQ/L (ref 98–107)
CO2: 20 MEQ/L (ref 22–29)
CREAT SERPL-MCNC: 1.56 MG/DL (ref 0.5–0.9)
EKG ATRIAL RATE: 69 BPM
EKG P AXIS: 38 DEGREES
EKG P-R INTERVAL: 204 MS
EKG Q-T INTERVAL: 426 MS
EKG QRS DURATION: 118 MS
EKG QTC CALCULATION (BAZETT): 456 MS
EKG R AXIS: -56 DEGREES
EKG T AXIS: 79 DEGREES
EKG VENTRICULAR RATE: 69 BPM
GFR AFRICAN AMERICAN: 40.9
GFR NON-AFRICAN AMERICAN: 33.8
GLOBULIN: 3.6 G/DL (ref 2.3–3.5)
GLUCOSE BLD-MCNC: 279 MG/DL (ref 74–109)
HCT VFR BLD CALC: 28.6 % (ref 37–47)
HEMOGLOBIN: 10 G/DL (ref 12–16)
INR BLD: 1
MCH RBC QN AUTO: 27.7 PG (ref 27–31.3)
MCHC RBC AUTO-ENTMCNC: 34.8 % (ref 33–37)
MCV RBC AUTO: 79.6 FL (ref 82–100)
PDW BLD-RTO: 15.4 % (ref 11.5–14.5)
PLATELET # BLD: 204 K/UL (ref 130–400)
POTASSIUM SERPL-SCNC: 4.9 MEQ/L (ref 3.5–5.1)
PROTHROMBIN TIME: 10.7 SEC (ref 9.6–12.3)
RBC # BLD: 3.6 M/UL (ref 4.2–5.4)
SODIUM BLD-SCNC: 134 MEQ/L (ref 132–144)
TOTAL PROTEIN: 6.8 G/DL (ref 6.4–8.1)
WBC # BLD: 11 K/UL (ref 4.8–10.8)

## 2018-11-09 PROCEDURE — C1894 INTRO/SHEATH, NON-LASER: HCPCS

## 2018-11-09 PROCEDURE — 93458 L HRT ARTERY/VENTRICLE ANGIO: CPT | Performed by: INTERNAL MEDICINE

## 2018-11-09 PROCEDURE — 93005 ELECTROCARDIOGRAM TRACING: CPT

## 2018-11-09 PROCEDURE — 2580000003 HC RX 258: Performed by: INTERNAL MEDICINE

## 2018-11-09 PROCEDURE — C1769 GUIDE WIRE: HCPCS

## 2018-11-09 PROCEDURE — 6360000002 HC RX W HCPCS

## 2018-11-09 PROCEDURE — 2709999900 HC NON-CHARGEABLE SUPPLY

## 2018-11-09 PROCEDURE — 2580000003 HC RX 258

## 2018-11-09 PROCEDURE — 92928 PRQ TCAT PLMT NTRAC ST 1 LES: CPT | Performed by: INTERNAL MEDICINE

## 2018-11-09 PROCEDURE — 85610 PROTHROMBIN TIME: CPT

## 2018-11-09 PROCEDURE — 2500000003 HC RX 250 WO HCPCS

## 2018-11-09 PROCEDURE — 80053 COMPREHEN METABOLIC PANEL: CPT

## 2018-11-09 PROCEDURE — C1887 CATHETER, GUIDING: HCPCS

## 2018-11-09 PROCEDURE — 85027 COMPLETE CBC AUTOMATED: CPT

## 2018-11-09 PROCEDURE — 92920 PRQ TRLUML C ANGIOP 1ART&/BR: CPT | Performed by: INTERNAL MEDICINE

## 2018-11-09 RX ORDER — ALPRAZOLAM 0.5 MG/1
0.5 TABLET ORAL
Status: DISCONTINUED | OUTPATIENT
Start: 2018-11-09 | End: 2018-11-09 | Stop reason: HOSPADM

## 2018-11-09 RX ORDER — SODIUM CHLORIDE 0.9 % (FLUSH) 0.9 %
10 SYRINGE (ML) INJECTION EVERY 12 HOURS SCHEDULED
Status: DISCONTINUED | OUTPATIENT
Start: 2018-11-09 | End: 2018-11-09

## 2018-11-09 RX ORDER — NITROGLYCERIN 0.4 MG/1
0.4 TABLET SUBLINGUAL EVERY 5 MIN PRN
Status: DISCONTINUED | OUTPATIENT
Start: 2018-11-09 | End: 2018-11-09 | Stop reason: HOSPADM

## 2018-11-09 RX ORDER — SODIUM CHLORIDE 9 MG/ML
INJECTION, SOLUTION INTRAVENOUS CONTINUOUS
Status: CANCELLED | OUTPATIENT
Start: 2018-11-09 | End: 2018-11-09

## 2018-11-09 RX ORDER — NITROFURANTOIN MACROCRYSTALS 50 MG/1
50 CAPSULE ORAL DAILY
Qty: 90 CAPSULE | Refills: 3 | Status: SHIPPED | OUTPATIENT
Start: 2018-11-09 | End: 2019-11-25 | Stop reason: SDUPTHER

## 2018-11-09 RX ORDER — SODIUM CHLORIDE 9 MG/ML
INJECTION, SOLUTION INTRAVENOUS CONTINUOUS
Status: DISCONTINUED | OUTPATIENT
Start: 2018-11-09 | End: 2018-11-09

## 2018-11-09 RX ADMIN — SODIUM CHLORIDE: 9 INJECTION, SOLUTION INTRAVENOUS at 08:38

## 2018-11-09 NOTE — OP NOTE
Brief OP note [facility] Cardiology    Access: R radial 5/6 sheath  Anticoagulation: heparin IV  Hemostasis: D-stat radial  Contrast 49 cc    Findings    LMT: nl  LAD: 90% mid ISR small vessel  LCX: mild  RCA: small non-dom mild    Dominance: right    PCI note: Successful PCI mid LAD balloon only 90% fabian 3 reduced to 10% fabian 3 2.25 NC    Conclusions: DAPT, maximal medical therapy    Alejandro Massey MD [facility] Director of Cardiology Services and Cardiac Catheterization Laboratory  [facility], [place]

## 2018-11-12 ENCOUNTER — CARE COORDINATION (OUTPATIENT)
Dept: CARE COORDINATION | Age: 58
End: 2018-11-12

## 2018-11-13 ENCOUNTER — OFFICE VISIT (OUTPATIENT)
Dept: BEHAVIORAL/MENTAL HEALTH CLINIC | Age: 58
End: 2018-11-13
Payer: MEDICARE

## 2018-11-13 DIAGNOSIS — Z79.4 TYPE 2 DIABETES MELLITUS WITH COMPLICATION, WITH LONG-TERM CURRENT USE OF INSULIN (HCC): ICD-10-CM

## 2018-11-13 DIAGNOSIS — E11.8 TYPE 2 DIABETES MELLITUS WITH COMPLICATION, WITH LONG-TERM CURRENT USE OF INSULIN (HCC): ICD-10-CM

## 2018-11-13 DIAGNOSIS — F20.9 SCHIZOPHRENIA, CHRONIC CONDITION (HCC): ICD-10-CM

## 2018-11-13 DIAGNOSIS — F32.89 OTHER DEPRESSION: Primary | ICD-10-CM

## 2018-11-13 PROCEDURE — 90832 PSYTX W PT 30 MINUTES: CPT | Performed by: PSYCHOLOGIST

## 2018-11-13 RX ORDER — FLASH GLUCOSE SENSOR
KIT MISCELLANEOUS
Qty: 3 EACH | Refills: 3 | Status: SHIPPED | OUTPATIENT
Start: 2018-11-13 | End: 2019-04-02

## 2018-11-13 RX ORDER — FLASH GLUCOSE SENSOR
1 KIT MISCELLANEOUS CONTINUOUS
Qty: 1 DEVICE | Refills: 0 | Status: SHIPPED | OUTPATIENT
Start: 2018-11-13 | End: 2019-04-02

## 2018-11-13 ASSESSMENT — PATIENT HEALTH QUESTIONNAIRE - PHQ9
4. FEELING TIRED OR HAVING LITTLE ENERGY: 1
SUM OF ALL RESPONSES TO PHQ9 QUESTIONS 1 & 2: 2
7. TROUBLE CONCENTRATING ON THINGS, SUCH AS READING THE NEWSPAPER OR WATCHING TELEVISION: 2
9. THOUGHTS THAT YOU WOULD BE BETTER OFF DEAD, OR OF HURTING YOURSELF: 0
10. IF YOU CHECKED OFF ANY PROBLEMS, HOW DIFFICULT HAVE THESE PROBLEMS MADE IT FOR YOU TO DO YOUR WORK, TAKE CARE OF THINGS AT HOME, OR GET ALONG WITH OTHER PEOPLE: 1
2. FEELING DOWN, DEPRESSED OR HOPELESS: 0
5. POOR APPETITE OR OVEREATING: 2
3. TROUBLE FALLING OR STAYING ASLEEP: 1
1. LITTLE INTEREST OR PLEASURE IN DOING THINGS: 2
SUM OF ALL RESPONSES TO PHQ QUESTIONS 1-9: 10
SUM OF ALL RESPONSES TO PHQ QUESTIONS 1-9: 10
6. FEELING BAD ABOUT YOURSELF - OR THAT YOU ARE A FAILURE OR HAVE LET YOURSELF OR YOUR FAMILY DOWN: 0
8. MOVING OR SPEAKING SO SLOWLY THAT OTHER PEOPLE COULD HAVE NOTICED. OR THE OPPOSITE, BEING SO FIGETY OR RESTLESS THAT YOU HAVE BEEN MOVING AROUND A LOT MORE THAN USUAL: 2

## 2018-11-13 NOTE — PATIENT INSTRUCTIONS
1. Use the chart and schedule below to plan pleasurable activities and tasks you need to attend to. Aim to do one each day and rate how you feel before and after  2. Use the long list below to add to your activities you enjoy  3. Aim to walk the halls at [facility] for 15 minutes on each day you go. 4. Return in about 4 weeks. Behavioral Activation Provided by [address] [employer] © 2012    You can begin to decrease depression by engaging in activities you find enjoyable, and by taking care of responsibilities that you have been neglecting. List three activities you enjoy: 1. Reading - need to go to [employer], will ask daughter to go today. 2. Walking - will walk for 15 minutes at [facility]    3. List three responsibilities you need to take care of: 1. Put away laundry    2.                3.                Try doing at least one activity or responsibility each day. Use the following scale to rate your depression, pleasant feelings, and sense of achievement before and after the activity. List Of Pleasurable Activities (meaningful activity)    Depression tried to fool us into thinking that we can rest our way out of depression, but this couldn't be farther from the truth. This is one of the evil tricks of depression. To combat this the pleasurable activities list can provide options of things to do to get people doing something/anything. Your assignment is to pick an activity that you are going to try each day either from this list or perhaps reading this list has reminded you of something else you can do. It can be a different activity each day or a different one each day. Once you have selected something then rate your mood before and after you do the activity using a scale (0-10, where 0 is the worst you have ever felt and 10 is the best you ever felt, or smiley face to frowning face, or whatever makes sense to you).  These activities are not meant to be a cure, but will help across a prairie or up a mountain  60. Drive or walk around your town and look at architecture  61. Driving  62. Early morning coffee and newspaper  63. Eat hot toast  64. Eat peppermint or cinnamon candy, slowly  65. Eating  66. Entertaining  67. Exercising  68. Fantasizing about the future  69. Flying in a plane  70. Flying kites  71. Gambling  72. Gardening  73. Get a massage  74. Getting out of (paying on) debt  75. Give or get a back rub  76. Go for a swim  77. Go look at the ocean  78. Go on a ferry ride  79. Go on a train  80. Go to a bakery or café and stand around, taking in the smells  81. Go to a museum  82. Go to the zoo and look at the animals  83. Go to wooded area and notice the smells  84. Go window-shopping  85. Go bike riding  86. Go bowling  87. Go camping  88. Go fishing  89. Go for a drive  90. Go hiking  91. Go home from work  92. Go horseback riding  93. Go on a picnic  94. Go on vacation  95. Go out to dinner  96. Go sail-boating  97. Go skating  98. Go skiing  99. Go swimming  100. Go to a movie in the middle of the week  101. Go to a party  102. Go to a spectator sport (baseball, basketball, tennis, soccer, auto racing, horse racing) 103. Go to museums  104. Go to plays and concerts  545. Go to the [id number]. Go to the beauty parlor  107. Go to the Podaddies  108. Hang pictures on your walls  109. Have a bowl of your favorite soup  110. Have a friend read to you  111. Have an ice cream cone or make an ice cream sundae  112. Have some heated water with lemon squeezed into it  113. Have a political discussion  114. Have an aquarium  115. Have a class reunions  116. Have discussions with friends  [phone]. Have family get-togethers  80. Have lunch with a friend  119. Have quiet evenings  120. Hobbies (stamp collecting, model building, etc. )  121. Hold hands  122. Hug someone  123. Hum a tune  124. Jogging, walking  125. Knitting  126. Landscape  127. Laughing  128. Lighting candles  129.  Listen to affirmation tapes  130. Listen to books on tape  131. Listen to classical music  132. Listen to emotional music such as anthems, hymnals, fight songs, or anything uplifting  133. Listen to mellow instrumental music  134. Listen to relaxation or meditation tapes  135. Listening to a stereo  136. Listening to music    137. Listening to others  138. Listening to stand-up comedy routines  139. Listening to the radio  140. Look at a piece of art and try to understand the artists conception  141. Look at art or photography books  142. Look at magazines  143. Look at photo books or magazines  144. Look at rivers, ponds, or fountains  145. Look at shop window displays  146. Look at trees, grass, or plants  147. Losing weight  148. Lying in the sun  149. Make a card from scratch and send it  150. Make a list of people you want to send holiday cards to  151. Make a meal for a friend or a loved one  80. Make a salad with green leafy lettuce, green and black olives, onions, and feta cheese  153. Make a to-do list  154. Make a gift for someone  155. Make Jigsaw puzzle  156. Make a list of tasks  157. Massage your hand, foot, arm, or leg  158. Meditating  159. Meet a friend for a game of Infox[employer] or [employer]  160. Meet someone for a meal, and pay the check when they dont expect it  161. Meeting new people  162. Memorize a poem or quotations  163. Memorize and recite prayers, poetry, or songs    164. Memorize facts about topics that interest you  165. Notice how the wind feels blowing across your face and body  166. Notice the smell of freshly cut grass  167. Organize your closet  168. [place] a room in your house a soothing color  169. Painting  170. Photography  171. Pick flowers for someone  172. Plan for the future  173. Plan a career change 174. Plan a days activities  175. Plan my career  176. Plan a party  177. Plan to go to school  178. Play a musical instrument  179. Play computer games  180. Play solitaire  181.  Play drink, feeling its warmth entering you  242. Slowly eat your favorite food, savoring every bite  243. Smell flowers  244. Smell fresh laundry  245. Soak your feet in warm water, a pool, or a stream 246. Soaking in the bathtub  247. Solving riddles mentally  248. Spending an evening with good friend  249. Splurging  250. Spray air freshener around your home  251. Squish your toes in mud  252. Start a petition for a cause or political issue you think is worthy  253. Staying on a diet  254. Take a bus ride  255. Take a friend to a spa  256. Take a long and luxurious bath  257. Take a long hot shower  258. Take inventory of your wardrobe  259. Take a sauna or a steam bath  260. Take ballet, tap dancing  261. Take care of my plants  262. Take children places  263. Talk on the phone  264. Thinking I did that pretty well after doing something  265. Think about becoming active in the community 266. Think about buying things  267. Think about getting   268. Think about having a family  56. Think about my good qualities 270. Think about past trips  271. Think about pleasant events  272. Think about [facility]  273. Think how it will be when I finish school  274. Think I have a lot more going for me than most people  275. Think I have done a full days work  276. Think Im a person who can cope  277. Think Im an OK person  278. Think Congregation thoughts  279. Thoughts about happy moments in my childhood  280. Throw a surprise birthday party  200. Tie a dollar bill to a helium balloon and release it into the juliet  282. Traveling abroad or in the [place]  283. Travel to [place]  284. Try to recall the features of faces you havent seen in a while  285. Try to remember every detail of a beautiful day you had  286. Try to understand obscure poetry  287. Turn on a fan, air purifier, or anything else that makes white noise  288. Use air freshener plug-ins  289. Use the Internet to build a [employer] file  290.

## 2018-11-13 NOTE — PROGRESS NOTES
Behavioral Health Consultation  Khanh Hazel Psy.D. Psychologist  11/13/18  9:02 AM      Time spent with Patient: 30 minutes  This is patient's second  [facility] appointment. Reason for Consult:  anxiety and history of schizophrenia and multiple medical concerns   Referring Provider: Ena Cody MD      Feedback given to PCP. S:  Pt shared that she had a stint put in her heart last week and is to begin cardiac therapy. Discussed that she is to start at 3 days per week the week after Thanksgiving. She is hesitant about this. Pt shared that she is feeling somewhat better. Emotionally. She stated that she has been using the breathing technique and it has been helpful. Also shared that she is still attending the [facility] center and is expanding her social activities. Pt shared that she will be spending time with her son today. Discussed pt's frustration with trouble losing weight. Made a plan to increase physical activity each week; pt agreeable to this. Made a plan for behavioral activation and provided list for pt to brainstorm. Pt agreeable to trying this.       O:  MSE:    Appearance    alert  Appetite abnormal - pt discussed difficulty with losing weight  Sleep disturbance No  Fatigue No  Loss of pleasure Yes  Impulsive behavior No  Speech    spontaneous, normal rate and normal volume  Mood   neutral   Affect    flat affect  Thought Content    concrete  Thought Process    linear and slow  Associations    logical connections  Insight    fair  Judgment    fair  Orientation    oriented to person, place, time, and general circumstances  Memory    recent and remote memory intact  Attention/Concentration    impaired  Morbid ideation No  Suicide Assessment    no suicidal ideation     History:    Medications:   Current Outpatient Prescriptions   Medication Sig Dispense Refill    nitrofurantoin (MACRODANTIN) 50 MG capsule Take 1 capsule by mouth daily 90 capsule 3    Continuous Blood Gluc  survived    Hypertension Father     Diabetes Sister     Mental Illness Sister          A:  Administered the PHQ9 which indicates a self report of moderate symptom distress. Pt would benefit from [facility] services to increase coping skills to provide symptom management/control/relief. PHQ Scores 11/13/2018 10/16/2018 3/22/2018 1/9/2018 11/2/2016 4/6/2016   PHQ2 Score 2 3 0 0 1 3   PHQ9 Score 10 14 0 0 4 7     Interpretation of Total Score Depression Severity: 1-4 = Minimal depression, 5-9 = Mild depression, 10-14 = Moderate depression, 15-19 = Moderately severe depression, 20-27 = Severe depression      Diagnosis:  Other Specified Depressive Disorder with anxious distress  Schizophrenia; paranoid type, BY HISTORY        Diagnosis Date    Anxiety     CAD S/P percutaneous coronary angioplasty 2015, 2018    stent per dr Burt Lin    Diabetic nephropathy with proteinuria ([facility] 2014    DJD (degenerative joint disease) of knee     Dr Lorrie Mccray GERD (gastroesophageal reflux disease)     Hemiparesis, left ([facility] [address] [facility] 2013    entered Assisted Living ([facility])    History of seizures     History of type C viral hepatitis     HTN (hypertension)     Hyperlipidemia     Impaired mobility and activities of daily living     Mediastinal lymphadenopathy 2013    Dr Marlen Finley, [facility]    Metabolic syndrome     Neurogenic urinary incontinence 2013    Neuropathy in diabetes ([address]     Obesity (BMI 30-39. 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

## 2018-11-14 ENCOUNTER — CARE COORDINATION (OUTPATIENT)
Dept: CARE COORDINATION | Age: 58
End: 2018-11-14

## 2018-11-14 RX ORDER — MELATONIN
Qty: 90 TABLET | Refills: 3 | Status: SHIPPED | OUTPATIENT
Start: 2018-11-14 | End: 2018-11-28 | Stop reason: SDUPTHER

## 2018-11-14 RX ORDER — ACETAMINOPHEN 500 MG
TABLET ORAL
Qty: 120 TABLET | Refills: 3 | Status: SHIPPED | OUTPATIENT
Start: 2018-11-14 | End: 2019-11-26

## 2018-11-15 NOTE — PROCEDURES
lesion and the mid LAD, and the lesion was dilated with compliant  balloon followed by a noncompliant balloon at high pressure. The wire  was pulled back. Completion angiography was performed. The guide and  wire were removed without event. Hemostasis obtained by D-Stat radial  device. No immediate complications. FINDINGS:  1. LMT:  Left main trunk is angiographically normal in appearance and  bifurcates into a large left anterior descending and left circumflex  coronary artery. 2.  LAD:  Left anterior descending artery is large in caliber, reaches  beyond the apex and has mild diffuse disease. There is a large diagonal  30% stenosis. There is mid LAD 30% stenosis. There are previously  placed stents in the mid LAD, which demonstrate 90% in-stent restenosis,  which is focal.  3.  LCX:  Left circumflex coronary artery is mildly diffusely diseased,  has a large lateral obtuse marginal and posterolateral obtuse marginal.   It is codominant. The obtuse marginal has 50% stenosis. 4.  RCA:  Right coronary artery is codominant, medium caliber and has no  significant stenosis. 5.  PCI NOTE:  Successful percutaneous coronary intervention of the mid  LAD, _____ angioplasty 90% stenosis, BHUMIKA 3 flow, and in-stent  restenosis reduced to 10% residual BHUMIKA 3 flow after dilation with 2.25  noncompliant balloon at high pressure.       Vinay Grimes MD    D: 11/15/2018 9:15:06       T: 11/15/2018 10:47:27     [email or web address]  Job#: [id number]     Doc#: [id number]      CC:

## 2018-11-26 ENCOUNTER — HOSPITAL ENCOUNTER (OUTPATIENT)
Dept: CARDIAC REHAB | Age: 58
Setting detail: THERAPIES SERIES
Discharge: HOME | End: 2018-11-26
Payer: MEDICARE

## 2018-11-26 PROCEDURE — 93798 PHYS/QHP OP CAR RHAB W/ECG: CPT

## 2018-11-27 ENCOUNTER — APPOINTMENT (OUTPATIENT)
Dept: CARDIAC REHAB | Age: 58
End: 2018-11-27
Payer: MEDICARE

## 2018-11-28 RX ORDER — CLOPIDOGREL BISULFATE 75 MG/1
75 TABLET ORAL DAILY
Qty: 90 TABLET | Refills: 2 | Status: SHIPPED | OUTPATIENT
Start: 2018-11-28 | End: 2019-08-01 | Stop reason: SDUPTHER

## 2018-11-29 ENCOUNTER — APPOINTMENT (OUTPATIENT)
Dept: CARDIAC REHAB | Age: 58
End: 2018-11-29
Payer: MEDICARE

## 2018-11-30 ENCOUNTER — HOSPITAL ENCOUNTER (OUTPATIENT)
Dept: CARDIAC REHAB | Age: 58
Setting detail: THERAPIES SERIES
Discharge: HOME | End: 2018-11-30
Payer: MEDICARE

## 2018-11-30 PROCEDURE — 93798 PHYS/QHP OP CAR RHAB W/ECG: CPT

## 2018-12-03 ENCOUNTER — APPOINTMENT (OUTPATIENT)
Dept: CARDIAC REHAB | Age: 58
End: 2018-12-03
Payer: MEDICARE

## 2018-12-04 ENCOUNTER — APPOINTMENT (OUTPATIENT)
Dept: CARDIAC REHAB | Age: 58
End: 2018-12-04
Payer: MEDICARE

## 2018-12-04 DIAGNOSIS — I10 ESSENTIAL HYPERTENSION: Chronic | ICD-10-CM

## 2018-12-04 RX ORDER — BLOOD-GLUCOSE METER
KIT MISCELLANEOUS
Qty: 400 EACH | Refills: 3 | Status: SHIPPED | OUTPATIENT
Start: 2018-12-04 | End: 2019-04-02 | Stop reason: SDUPTHER

## 2018-12-04 RX ORDER — NYSTATIN 100000 [USP'U]/G
POWDER TOPICAL
Qty: 60 G | Refills: 2 | Status: SHIPPED | OUTPATIENT
Start: 2018-12-04 | End: 2018-12-06 | Stop reason: SDUPTHER

## 2018-12-04 RX ORDER — CARVEDILOL 6.25 MG/1
TABLET ORAL
Qty: 180 TABLET | Refills: 1 | Status: SHIPPED | OUTPATIENT
Start: 2018-12-04 | End: 2018-12-06 | Stop reason: SDUPTHER

## 2018-12-05 ENCOUNTER — OFFICE VISIT (OUTPATIENT)
Dept: CARDIOLOGY CLINIC | Age: 58
End: 2018-12-05
Payer: MEDICARE

## 2018-12-05 VITALS
HEIGHT: 66 IN | OXYGEN SATURATION: 96 % | BODY MASS INDEX: 37.28 KG/M2 | DIASTOLIC BLOOD PRESSURE: 68 MMHG | HEART RATE: 82 BPM | WEIGHT: 232 LBS | RESPIRATION RATE: 16 BRPM | SYSTOLIC BLOOD PRESSURE: 123 MMHG

## 2018-12-05 DIAGNOSIS — I25.119 CORONARY ARTERY DISEASE INVOLVING NATIVE CORONARY ARTERY OF NATIVE HEART WITH ANGINA PECTORIS (HCC): Primary | ICD-10-CM

## 2018-12-05 DIAGNOSIS — I10 ESSENTIAL HYPERTENSION: ICD-10-CM

## 2018-12-05 DIAGNOSIS — R06.02 SHORTNESS OF BREATH: ICD-10-CM

## 2018-12-05 DIAGNOSIS — E78.2 MIXED HYPERLIPIDEMIA: ICD-10-CM

## 2018-12-05 PROCEDURE — 3017F COLORECTAL CA SCREEN DOC REV: CPT | Performed by: INTERNAL MEDICINE

## 2018-12-05 PROCEDURE — G8417 CALC BMI ABV UP PARAM F/U: HCPCS | Performed by: INTERNAL MEDICINE

## 2018-12-05 PROCEDURE — G8598 ASA/ANTIPLAT THER USED: HCPCS | Performed by: INTERNAL MEDICINE

## 2018-12-05 PROCEDURE — 1036F TOBACCO NON-USER: CPT | Performed by: INTERNAL MEDICINE

## 2018-12-05 PROCEDURE — G8482 FLU IMMUNIZE ORDER/ADMIN: HCPCS | Performed by: INTERNAL MEDICINE

## 2018-12-05 PROCEDURE — G8427 DOCREV CUR MEDS BY ELIG CLIN: HCPCS | Performed by: INTERNAL MEDICINE

## 2018-12-05 PROCEDURE — 99213 OFFICE O/P EST LOW 20 MIN: CPT | Performed by: INTERNAL MEDICINE

## 2018-12-05 NOTE — PROGRESS NOTES
[facility] CARDIOLOGY OFFICE FOLLOW-UP      Patient: Fermin Nolen  YOB: 1960  MRN: [id number]    Chief Complaint:  Chief Complaint   Patient presents with    Coronary Artery Disease     f/u Post Cath    Hypertension       Subjective/HPI    The patient is followed in the office chronically for the following problems: CAD, HTN, HPL, recent PCI    The last office visit focused on the following: dyspnea, pre-cath    Since the last office visit, the patient has had PCI balloon angioplasty mid LAD. She not had new symptoms/events. Less short of breath, has more energy after the procedure. Past Medical History:   Diagnosis Date    Anxiety     CAD S/P percutaneous coronary angioplasty ,     stent per dr Td Jones    Diabetic nephropathy with proteinuria ([address]     DJD (degenerative joint disease) of knee     Dr Rhona Hall GERD (gastroesophageal reflux disease)     Hemiparesis, left ([address] 2013    entered Assisted Living ([facility])    History of seizures     History of type C viral hepatitis     HTN (hypertension)     Hyperlipidemia     Impaired mobility and activities of daily living     Mediastinal lymphadenopathy     Dr Kalen Vasquez [facility]    Metabolic syndrome     Neurogenic urinary incontinence 2013    Neuropathy in diabetes ([address]     Obesity (BMI 30-39. 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 ([address]     Type 2 diabetes mellitus with renal manifestations, controlled ([address] 2015    Insulin dependent, Dr Adry Caro Urinary incontinence due to cognitive impairment 2013    Vitamin D deficiency 2014       Past Surgical History:   Procedure Laterality Date     SECTION      x1    mouth daily 30 tablet 5    FREESTYLE LANCETS MISC Test blood sugar 4 times daily (AC and HS) 100 each 3    BD AUTOSHIELD DUO 30G X 5 MM MISC USE AS DIRECTED 2 TIMES DAILY FOR USE WITH LANTUS SOLOSTAR, 100 each 3    Blood Pressure KIT 1 Device by Does not apply route daily 1 kit 0    Alcohol Swabs (EASY TOUCH ALCOHOL PREP MEDIUM) 70 % PADS USE AS DIRECTED THREE TIMES A  each 3    Blood Glucose Monitoring Suppl (FREESTYLE LITE) CORETTA use as directed  0    Cholecalciferol (VITAMIN D3) 1000 units TABS TAKE (1) TABLET BY MOUTH DAILY 90 tablet 3     No current facility-administered medications for this visit. Review of Systems:   Review of Systems   Constitutional: Negative for activity change and appetite change. HENT: Negative for congestion. Respiratory: Negative for apnea, choking and chest tightness. Cardiovascular: Negative for chest pain. Gastrointestinal: Negative for abdominal distention and abdominal pain. Endocrine: Negative for cold intolerance and heat intolerance. Genitourinary: Negative for dysuria and enuresis. Musculoskeletal: Negative for arthralgias and back pain. Skin: Negative for color change. Allergic/Immunologic: Negative. Neurological: Negative for dizziness, seizures, syncope and light-headedness. Psychiatric/Behavioral: Negative for agitation, behavioral problems and confusion. Physical Examination:    /68 (Site: Left Upper Arm, Position: Sitting, Cuff Size: Large Adult)   Pulse 82   Resp 16   Ht 5' 6\" (1.676 m)   Wt 232 lb (105.2 kg)   SpO2 96%   BMI 37.45 kg/m²    Physical Exam   Constitutional: The patient appears healthy. No distress. HENT: Mouth/Throat: Oropharynx is clear. Eyes: Pupils are equal, round, and reactive to light. Neck: Normal range of motion. No JVD present. Cardiovascular: Regular rhythm, S1 normal, S2 normal, normal heart sounds and intact distal pulses. Exam reveals no gallop.     No murmur 26 11/09/2018    LABALBU 3.2 11/09/2018    CREATININE 1.56 11/09/2018    CALCIUM 9.1 11/09/2018    GFRAA 40.9 11/09/2018    LABGLOM 33.8 11/09/2018    GLUCOSE 279 11/09/2018     Magnesium:    Lab Results   Component Value Date    MG 1.9 02/24/2018     TSH:  Lab Results   Component Value Date    TSH 0.574 10/08/2017       Patient Active Problem List   Diagnosis    Coronary artery disease involving native heart with angina pectoris (HCC)    Schizophrenia, paranoid, chronic    Metabolic syndrome    Obesity    Vitamin B 12 deficiency    Cerebral microvascular disease    Mixed hyperlipidemia    Other hammer toe (acquired)    Vitamin D insufficiency    Incontinence of urine    Diabetic nephropathy with proteinuria ([address]    HTN (hypertension)    History of type C viral hepatitis    Urinary incontinence due to cognitive impairment    Status post total knee replacement, right    History of seizures    Renal insufficiency    Stented coronary artery-plan is to stay on Plavix indefinately per Dr Kaylee Chen Controlled type 2 diabetes mellitus with diabetic neuropathy, with long-term current use of insulin (HCC)    [facility]    Dyspnea on exertion    Angina, class III ([address]    Acute kidney injury ([address]    Neuropathy in diabetes ([address]    Status post total left knee replacement       Assessment/Plan:    1. Coronary artery disease involving native coronary artery of native heart with angina pectoris [facility])  Coronary artery disease: This patient has known CAD and recent PCI. Currently the angina class is II. The patient will be treated with guideline-directed therapy including aspirin, statin, b-blocker. The patient currently does need a thienopyridine. The patient's risk factors of hypertension/hyperlipidemia/obesity/diabetes will be optimally managed.   The patient is a non-smoker, appropriate counseling given to avoid tobacco.  The patient currently has minimal symptoms, and will continue on

## 2018-12-06 ENCOUNTER — APPOINTMENT (OUTPATIENT)
Dept: CARDIAC REHAB | Age: 58
End: 2018-12-06
Payer: MEDICARE

## 2018-12-06 DIAGNOSIS — I10 ESSENTIAL HYPERTENSION: Chronic | ICD-10-CM

## 2018-12-07 ENCOUNTER — HOSPITAL ENCOUNTER (OUTPATIENT)
Dept: CARDIAC REHAB | Age: 58
Setting detail: THERAPIES SERIES
Discharge: HOME | End: 2018-12-07
Payer: MEDICARE

## 2018-12-07 PROCEDURE — 93798 PHYS/QHP OP CAR RHAB W/ECG: CPT

## 2018-12-10 ENCOUNTER — APPOINTMENT (OUTPATIENT)
Dept: CARDIAC REHAB | Age: 58
End: 2018-12-10
Payer: MEDICARE

## 2018-12-10 ENCOUNTER — HOSPITAL ENCOUNTER (OUTPATIENT)
Dept: CARDIAC REHAB | Age: 58
Setting detail: THERAPIES SERIES
Discharge: HOME | End: 2018-12-10
Payer: MEDICARE

## 2018-12-10 PROCEDURE — 93798 PHYS/QHP OP CAR RHAB W/ECG: CPT

## 2018-12-11 ENCOUNTER — APPOINTMENT (OUTPATIENT)
Dept: CARDIAC REHAB | Age: 58
End: 2018-12-11
Payer: MEDICARE

## 2018-12-12 RX ORDER — NYSTATIN 100000 [USP'U]/G
POWDER TOPICAL
Qty: 60 G | Refills: 3 | Status: SHIPPED | OUTPATIENT
Start: 2018-12-12 | End: 2019-03-13 | Stop reason: SDUPTHER

## 2018-12-12 RX ORDER — CARVEDILOL 6.25 MG/1
TABLET ORAL
Qty: 180 TABLET | Refills: 1 | Status: SHIPPED | OUTPATIENT
Start: 2018-12-12 | End: 2019-05-29 | Stop reason: SDUPTHER

## 2018-12-13 ENCOUNTER — APPOINTMENT (OUTPATIENT)
Dept: CARDIAC REHAB | Age: 58
End: 2018-12-13
Payer: MEDICARE

## 2018-12-14 ENCOUNTER — OFFICE VISIT (OUTPATIENT)
Dept: INTERNAL MEDICINE CLINIC | Age: 58
End: 2018-12-14
Payer: MEDICARE

## 2018-12-14 VITALS
HEART RATE: 71 BPM | DIASTOLIC BLOOD PRESSURE: 64 MMHG | OXYGEN SATURATION: 93 % | HEIGHT: 66 IN | TEMPERATURE: 97.8 F | WEIGHT: 236 LBS | BODY MASS INDEX: 37.93 KG/M2 | SYSTOLIC BLOOD PRESSURE: 114 MMHG

## 2018-12-14 DIAGNOSIS — Z79.4 CONTROLLED TYPE 2 DIABETES MELLITUS WITH DIABETIC NEUROPATHY, WITH LONG-TERM CURRENT USE OF INSULIN (HCC): Primary | ICD-10-CM

## 2018-12-14 DIAGNOSIS — E11.40 CHRONIC PAINFUL DIABETIC NEUROPATHY (HCC): ICD-10-CM

## 2018-12-14 DIAGNOSIS — E11.40 CONTROLLED TYPE 2 DIABETES MELLITUS WITH DIABETIC NEUROPATHY, WITH LONG-TERM CURRENT USE OF INSULIN (HCC): ICD-10-CM

## 2018-12-14 DIAGNOSIS — Z79.4 CONTROLLED TYPE 2 DIABETES MELLITUS WITH DIABETIC NEUROPATHY, WITH LONG-TERM CURRENT USE OF INSULIN (HCC): ICD-10-CM

## 2018-12-14 DIAGNOSIS — I10 ESSENTIAL HYPERTENSION: ICD-10-CM

## 2018-12-14 DIAGNOSIS — E11.40 CONTROLLED TYPE 2 DIABETES MELLITUS WITH DIABETIC NEUROPATHY, WITH LONG-TERM CURRENT USE OF INSULIN (HCC): Primary | ICD-10-CM

## 2018-12-14 LAB
ANION GAP SERPL CALCULATED.3IONS-SCNC: 11 MEQ/L (ref 7–13)
BUN BLDV-MCNC: 29 MG/DL (ref 8–23)
CALCIUM SERPL-MCNC: 9.6 MG/DL (ref 8.6–10.2)
CHLORIDE BLD-SCNC: 103 MEQ/L (ref 98–107)
CO2: 23 MEQ/L (ref 22–29)
CREAT SERPL-MCNC: 1.69 MG/DL (ref 0.5–0.9)
CREATININE URINE: 81.7 MG/DL
GFR AFRICAN AMERICAN: 37.2
GFR NON-AFRICAN AMERICAN: 30.8
GLUCOSE BLD-MCNC: 95 MG/DL (ref 74–109)
HCT VFR BLD CALC: 29.5 % (ref 37–47)
HEMOGLOBIN: 10 G/DL (ref 12–16)
MCH RBC QN AUTO: 27.1 PG (ref 27–31.3)
MCHC RBC AUTO-ENTMCNC: 34 % (ref 33–37)
MCV RBC AUTO: 79.6 FL (ref 82–100)
MICROALBUMIN UR-MCNC: 410.3 MG/DL
MICROALBUMIN/CREAT UR-RTO: 5022 MG/G (ref 0–30)
PDW BLD-RTO: 15.7 % (ref 11.5–14.5)
PLATELET # BLD: 240 K/UL (ref 130–400)
POTASSIUM SERPL-SCNC: 4.9 MEQ/L (ref 3.5–5.1)
RBC # BLD: 3.71 M/UL (ref 4.2–5.4)
SODIUM BLD-SCNC: 137 MEQ/L (ref 132–144)
WBC # BLD: 9.2 K/UL (ref 4.8–10.8)

## 2018-12-14 PROCEDURE — 3044F HG A1C LEVEL LT 7.0%: CPT | Performed by: INTERNAL MEDICINE

## 2018-12-14 PROCEDURE — G8598 ASA/ANTIPLAT THER USED: HCPCS | Performed by: INTERNAL MEDICINE

## 2018-12-14 PROCEDURE — 2022F DILAT RTA XM EVC RTNOPTHY: CPT | Performed by: INTERNAL MEDICINE

## 2018-12-14 PROCEDURE — 1036F TOBACCO NON-USER: CPT | Performed by: INTERNAL MEDICINE

## 2018-12-14 PROCEDURE — 3017F COLORECTAL CA SCREEN DOC REV: CPT | Performed by: INTERNAL MEDICINE

## 2018-12-14 PROCEDURE — G8482 FLU IMMUNIZE ORDER/ADMIN: HCPCS | Performed by: INTERNAL MEDICINE

## 2018-12-14 PROCEDURE — G8417 CALC BMI ABV UP PARAM F/U: HCPCS | Performed by: INTERNAL MEDICINE

## 2018-12-14 PROCEDURE — G8427 DOCREV CUR MEDS BY ELIG CLIN: HCPCS | Performed by: INTERNAL MEDICINE

## 2018-12-14 PROCEDURE — 99214 OFFICE O/P EST MOD 30 MIN: CPT | Performed by: INTERNAL MEDICINE

## 2018-12-14 RX ORDER — LINAGLIPTIN 5 MG/1
5 TABLET, FILM COATED ORAL DAILY
Qty: 30 TABLET | Refills: 5 | Status: SHIPPED | OUTPATIENT
Start: 2018-12-14 | End: 2019-07-18 | Stop reason: SDUPTHER

## 2018-12-14 RX ORDER — PREGABALIN 75 MG/1
75 CAPSULE ORAL 2 TIMES DAILY
Qty: 60 CAPSULE | Refills: 2 | Status: SHIPPED | OUTPATIENT
Start: 2018-12-14 | End: 2019-03-13 | Stop reason: SDUPTHER

## 2018-12-14 ASSESSMENT — ENCOUNTER SYMPTOMS
BLOOD IN STOOL: 0
SHORTNESS OF BREATH: 0
TROUBLE SWALLOWING: 0
CHOKING: 0
COUGH: 0
CHEST TIGHTNESS: 0
ABDOMINAL PAIN: 0
BLURRED VISION: 0
EYE PAIN: 0

## 2018-12-14 NOTE — PROGRESS NOTES
Anxiety     CAD S/P percutaneous coronary angioplasty ,     stent per dr Felice French    Diabetic nephropathy with proteinuria ([facility]     DJD (degenerative joint disease) of knee     Dr Shanika Sierra GERD (gastroesophageal reflux disease)     Hemiparesis, left ([facility] [address] 2013    entered Ass[facility] ([facility])    History of seizures     History of type C viral hepatitis     HTN (hypertension)     Hyperlipidemia     Impaired mobility and activities of daily living     Mediastinal lymphadenopathy     Dr Mychal Rothman, [facility]    Metabolic syndrome     Neurogenic urinary incontinence 2013    Neuropathy in diabetes ([facility] [address] [facility]     Obesity (BMI 30-39. 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 Tammie Palomares      x1 Dr. Brodie Orozco, Dr Neema Madsen 2018    HYSTERECTOMY, TOTAL ABDOMINAL      one ovary intact, Dr Hernesto Neri, menorrhagia    OR TOTAL KNEE ARTHROPLASTY Left 2018    LEFT KNEE TOTAL KNEE ARTHROPLASTY, SHAYNA, NERVE BLOCK performed by Dion Brown MD at [address] [facility] [address] Right     TOTAL KNEE ARTHROPLASTY  16    Dr Zepeda [place] Marital status:      Spouse name: N/A    Number of children: 2    Years of education: N/A     Occupational History    disabled      Social History Main Topics    Smoking Blood Gluc  (FREESTYLE KAIT READER) CORETTA 1 Device by Does not apply route continuous 1 Device 0    Continuous Blood Gluc Sensor (FREESTYLE KAIT SENSOR SYSTEM) MISC Change sensor every 10 days 3 each 3    nitrofurantoin (MACRODANTIN) 50 MG capsule Take 1 capsule by mouth daily 90 capsule 3    BASAGLAR KWIKPEN 100 UNIT/ML injection pen INJECT 20 UNITS SUBCUTANEOUSLY TWICE DAILY 15 mL 5    NOVOFINE AUTOCOVER 30G X 8 MM MISC Inject 1 each into the skin 3 times daily 100 each 3    insulin aspart (NOVOLOG FLEXPEN) 100 UNIT/ML injection pen Inject 18 units subcut tid before each meal 3 mL 2    traZODone (DESYREL) 50 MG tablet TAKE 1-2 TABLETS BY MOUTH EVERY NIGHT AT BEDTIME AS NEEDED FOR INSOMNIA 180 tablet 1    omeprazole (PRILOSEC) 20 MG delayed release capsule TAKE 1 CAPSULE BY MOUTH ONCE DAILY AS NEEDED FOR HEARTBURN 90 capsule 1    ASPIRIN LOW DOSE 81 MG EC tablet TAKE (1) TABLET BY MOUTH DAILY 90 tablet 1    atorvastatin (LIPITOR) 40 MG tablet TAKE (1) TABLET BY MOUTH NIGHTLY 90 tablet 1    nitroGLYCERIN (NITROSTAT) 0.4 MG SL tablet DISSOLVE 1 TAB UNDER THE TONGUE AS NEEDED FOR CHEST PAIN EVERY 5 MINUTES UP TO 3 TIMES. IF NO RELIEF CALL 911. 75 tablet 1    sertraline (ZOLOFT) 50 MG tablet TAKE (1) TABLET BY MOUTH DAILY 30 tablet 5    cyanocobalamin ([employer] VITAMIN B12) 1000 MCG tablet Take 1 tablet by mouth daily 30 tablet 5    FREESTYLE LANCETS MISC Test blood sugar 4 times daily (AC and HS) 100 each 3    Blood Pressure KIT 1 Device by Does not apply route daily 1 kit 0    Alcohol Swabs (EASY TOUCH ALCOHOL PREP MEDIUM) 70 % PADS USE AS DIRECTED THREE TIMES A  each 3    Blood Glucose Monitoring Suppl (FREESTYLE LITE) CORETTA use as directed  0     No current facility-administered medications on file prior to visit. Review of Systems   Constitutional: Negative for appetite change, fatigue, unexpected weight change and weight loss.    HENT: Negative for congestion, nosebleeds and not labile. She is not slowed and not withdrawn. Thought content is not paranoid and not delusional. She does not express inappropriate judgment. She does not exhibit a depressed mood. She exhibits normal recent memory and normal remote memory. Good insight and motivation  She is attentive. Assessment:    Michelle was seen today for diabetes and leg pain. Diagnoses and all orders for this visit:    Controlled type 2 diabetes mellitus with diabetic neuropathy, with long-term current use of insulin ([address]              Stable on the current medication, to focus on lifestyle especially weight loss  -     Cancel: Microalbumin / Creatinine Urine Ratio; Future  -     CBC; Future    Chronic painful diabetic neuropathy ([facility] [address]              The patient is evaluated today prior to controlled medication refill. The patient has been on a stable dose of medication for at least 3 months, without impairing side effects, with good insight into the problem, compliant with the treatment plan, free from adverse effects from the medication (such as concentration problems, behavioral disturbances, falls, etc), and with no limitation in work/ daily activities abilities. The medication provides relief to the symptoms for which it is prescribed. -     pregabalin (LYRICA) 75 MG capsule; Take 1 capsule by mouth 2 times daily for 90 days. .    Other orders  -     TRADJENTA 5 MG tablet; Take 1 tablet by mouth daily        Plan:    Reviewed with the patient (/and caregiver if present): current health status, medications, activities and diet. See also orders and comments in the assessment section. Today's diagnosis (in the context ofchronic problems) was discussed with patient (/and caregiver if present), questions answered. Diabetes Counseling   Patient was again counseled regarding diabetes as a chronic illness with long term risk for complications affecting the kidneys, eyes, nerves, blood vessels.  The importance of maintaining a healthy lifestyle, checking blood sugar daily at home and keeping log, watching blood pressure, caloric intake, weight and physical activity were also addressed during today's office visit. Orders Placed This Encounter   Medications    TRADJENTA 5 MG tablet     Sig: Take 1 tablet by mouth daily     Dispense:  30 tablet     Refill:  5    pregabalin (LYRICA) 75 MG capsule     Sig: Take 1 capsule by mouth 2 times daily for 90 days. .     Dispense:  60 capsule     Refill:  2       Orders Placed This Encounter   Procedures    CBC     Standing Status:   Future     Number of Occurrences:   1     Standing Expiration Date:   12/14/2019     Further workup and plan will be determined based on clinical progression and follow up test/ treatment results. Close follow up needed to evaluate treatment results and for care coordination. Return in about 3 months (around 3/14/2019). I have reviewed the patient's medical and surgical, family and social history, health maintenance schedule, and updated the computerized patient record. Please note this report has been partially produced by using speech recognition hardware. It may contain errors related to the system, including grammar, punctuation and spelling as well as words and phrases that may seem inaccurate. For anyquestions or concerns, please feel free to contact me for clarification.         Electronically signed by Katia Cochran MD

## 2018-12-17 ENCOUNTER — APPOINTMENT (OUTPATIENT)
Dept: CARDIAC REHAB | Age: 58
End: 2018-12-17
Payer: MEDICARE

## 2018-12-17 ENCOUNTER — HOSPITAL ENCOUNTER (OUTPATIENT)
Dept: CARDIAC REHAB | Age: 58
Setting detail: THERAPIES SERIES
Discharge: HOME | End: 2018-12-17
Payer: MEDICARE

## 2018-12-17 PROCEDURE — 93798 PHYS/QHP OP CAR RHAB W/ECG: CPT

## 2018-12-19 ENCOUNTER — TELEPHONE (OUTPATIENT)
Dept: CARDIOLOGY CLINIC | Age: 58
End: 2018-12-19

## 2018-12-21 ENCOUNTER — HOSPITAL ENCOUNTER (OUTPATIENT)
Dept: CARDIAC REHAB | Age: 58
Setting detail: THERAPIES SERIES
Discharge: HOME | End: 2018-12-21
Payer: MEDICARE

## 2018-12-21 PROCEDURE — 93798 PHYS/QHP OP CAR RHAB W/ECG: CPT

## 2018-12-24 ENCOUNTER — APPOINTMENT (OUTPATIENT)
Dept: CARDIAC REHAB | Age: 58
End: 2018-12-24
Payer: MEDICARE

## 2018-12-26 ENCOUNTER — HOSPITAL ENCOUNTER (OUTPATIENT)
Dept: CARDIAC REHAB | Age: 58
Setting detail: THERAPIES SERIES
Discharge: HOME | End: 2018-12-26
Payer: MEDICARE

## 2018-12-26 PROCEDURE — 93798 PHYS/QHP OP CAR RHAB W/ECG: CPT

## 2018-12-28 ENCOUNTER — APPOINTMENT (OUTPATIENT)
Dept: CARDIAC REHAB | Age: 58
End: 2018-12-28
Payer: MEDICARE

## 2018-12-28 ENCOUNTER — HOSPITAL ENCOUNTER (OUTPATIENT)
Dept: CARDIAC REHAB | Age: 58
Setting detail: THERAPIES SERIES
Discharge: HOME | End: 2018-12-28
Payer: MEDICARE

## 2018-12-28 PROCEDURE — 93798 PHYS/QHP OP CAR RHAB W/ECG: CPT

## 2018-12-31 ENCOUNTER — APPOINTMENT (OUTPATIENT)
Dept: CARDIAC REHAB | Age: 58
End: 2018-12-31
Payer: MEDICARE

## 2019-01-04 ENCOUNTER — HOSPITAL ENCOUNTER (OUTPATIENT)
Dept: ORTHOPEDIC SURGERY | Age: 59
Discharge: HOME | End: 2019-01-06
Payer: MEDICARE

## 2019-01-04 ENCOUNTER — CARE COORDINATION (OUTPATIENT)
Dept: CARE COORDINATION | Age: 59
End: 2019-01-04

## 2019-01-04 ENCOUNTER — APPOINTMENT (OUTPATIENT)
Dept: CARDIAC REHAB | Age: 59
End: 2019-01-04
Payer: MEDICARE

## 2019-01-04 DIAGNOSIS — M25.569 ARTHRALGIA OF LOWER LEG, UNSPECIFIED LATERALITY: ICD-10-CM

## 2019-01-04 PROCEDURE — 73562 X-RAY EXAM OF KNEE 3: CPT

## 2019-01-07 ENCOUNTER — APPOINTMENT (OUTPATIENT)
Dept: CARDIAC REHAB | Age: 59
End: 2019-01-07
Payer: MEDICARE

## 2019-01-08 ENCOUNTER — OFFICE VISIT (OUTPATIENT)
Dept: BEHAVIORAL/MENTAL HEALTH CLINIC | Age: 59
End: 2019-01-08
Payer: MEDICARE

## 2019-01-08 DIAGNOSIS — F20.9 SCHIZOPHRENIA, CHRONIC CONDITION (HCC): ICD-10-CM

## 2019-01-08 DIAGNOSIS — F32.89 OTHER DEPRESSION: Primary | ICD-10-CM

## 2019-01-08 PROCEDURE — 90832 PSYTX W PT 30 MINUTES: CPT | Performed by: PSYCHOLOGIST

## 2019-01-08 ASSESSMENT — PATIENT HEALTH QUESTIONNAIRE - PHQ9
SUM OF ALL RESPONSES TO PHQ QUESTIONS 1-9: 6
3. TROUBLE FALLING OR STAYING ASLEEP: 0
8. MOVING OR SPEAKING SO SLOWLY THAT OTHER PEOPLE COULD HAVE NOTICED. OR THE OPPOSITE, BEING SO FIGETY OR RESTLESS THAT YOU HAVE BEEN MOVING AROUND A LOT MORE THAN USUAL: 1
6. FEELING BAD ABOUT YOURSELF - OR THAT YOU ARE A FAILURE OR HAVE LET YOURSELF OR YOUR FAMILY DOWN: 0
5. POOR APPETITE OR OVEREATING: 1
4. FEELING TIRED OR HAVING LITTLE ENERGY: 2
9. THOUGHTS THAT YOU WOULD BE BETTER OFF DEAD, OR OF HURTING YOURSELF: 1
2. FEELING DOWN, DEPRESSED OR HOPELESS: 0
SUM OF ALL RESPONSES TO PHQ9 QUESTIONS 1 & 2: 0
10. IF YOU CHECKED OFF ANY PROBLEMS, HOW DIFFICULT HAVE THESE PROBLEMS MADE IT FOR YOU TO DO YOUR WORK, TAKE CARE OF THINGS AT HOME, OR GET ALONG WITH OTHER PEOPLE: 1
7. TROUBLE CONCENTRATING ON THINGS, SUCH AS READING THE NEWSPAPER OR WATCHING TELEVISION: 1
SUM OF ALL RESPONSES TO PHQ QUESTIONS 1-9: 6
1. LITTLE INTEREST OR PLEASURE IN DOING THINGS: 0

## 2019-01-11 ENCOUNTER — APPOINTMENT (OUTPATIENT)
Dept: CARDIAC REHAB | Age: 59
End: 2019-01-11
Payer: MEDICARE

## 2019-01-11 ENCOUNTER — HOSPITAL ENCOUNTER (OUTPATIENT)
Dept: CARDIAC REHAB | Age: 59
Setting detail: THERAPIES SERIES
Discharge: HOME | End: 2019-01-11
Payer: MEDICARE

## 2019-01-11 PROCEDURE — 93798 PHYS/QHP OP CAR RHAB W/ECG: CPT

## 2019-01-14 ENCOUNTER — APPOINTMENT (OUTPATIENT)
Dept: CARDIAC REHAB | Age: 59
End: 2019-01-14
Payer: MEDICARE

## 2019-01-18 ENCOUNTER — APPOINTMENT (OUTPATIENT)
Dept: CARDIAC REHAB | Age: 59
End: 2019-01-18
Payer: MEDICARE

## 2019-01-21 ENCOUNTER — APPOINTMENT (OUTPATIENT)
Dept: CARDIAC REHAB | Age: 59
End: 2019-01-21
Payer: MEDICARE

## 2019-01-25 ENCOUNTER — APPOINTMENT (OUTPATIENT)
Dept: CARDIAC REHAB | Age: 59
End: 2019-01-25
Payer: MEDICARE

## 2019-01-28 ENCOUNTER — APPOINTMENT (OUTPATIENT)
Dept: CARDIAC REHAB | Age: 59
End: 2019-01-28
Payer: MEDICARE

## 2019-01-30 RX ORDER — INSULIN ASPART 100 [IU]/ML
INJECTION, SOLUTION INTRAVENOUS; SUBCUTANEOUS
Qty: 15 ML | Refills: 11 | OUTPATIENT
Start: 2019-01-30

## 2019-02-01 ENCOUNTER — APPOINTMENT (OUTPATIENT)
Dept: CARDIAC REHAB | Age: 59
End: 2019-02-01
Payer: MEDICARE

## 2019-02-04 ENCOUNTER — APPOINTMENT (OUTPATIENT)
Dept: CARDIAC REHAB | Age: 59
End: 2019-02-04
Payer: MEDICARE

## 2019-02-08 ENCOUNTER — APPOINTMENT (OUTPATIENT)
Dept: CARDIAC REHAB | Age: 59
End: 2019-02-08
Payer: MEDICARE

## 2019-02-11 ENCOUNTER — APPOINTMENT (OUTPATIENT)
Dept: CARDIAC REHAB | Age: 59
End: 2019-02-11
Payer: MEDICARE

## 2019-02-14 ENCOUNTER — CARE COORDINATION (OUTPATIENT)
Dept: CARE COORDINATION | Age: 59
End: 2019-02-14

## 2019-02-15 ENCOUNTER — APPOINTMENT (OUTPATIENT)
Dept: CARDIAC REHAB | Age: 59
End: 2019-02-15
Payer: MEDICARE

## 2019-02-18 ENCOUNTER — APPOINTMENT (OUTPATIENT)
Dept: CARDIAC REHAB | Age: 59
End: 2019-02-18
Payer: MEDICARE

## 2019-02-22 ENCOUNTER — APPOINTMENT (OUTPATIENT)
Dept: CARDIAC REHAB | Age: 59
End: 2019-02-22
Payer: MEDICARE

## 2019-02-25 ENCOUNTER — HOSPITAL ENCOUNTER (OUTPATIENT)
Dept: CARDIAC REHAB | Age: 59
Setting detail: THERAPIES SERIES
Discharge: HOME | End: 2019-02-25
Payer: MEDICARE

## 2019-02-25 ENCOUNTER — APPOINTMENT (OUTPATIENT)
Dept: CARDIAC REHAB | Age: 59
End: 2019-02-25
Payer: MEDICARE

## 2019-02-25 PROCEDURE — 93798 PHYS/QHP OP CAR RHAB W/ECG: CPT

## 2019-02-27 RX ORDER — TRAZODONE HYDROCHLORIDE 50 MG/1
TABLET ORAL
Qty: 180 TABLET | Refills: 1 | Status: SHIPPED | OUTPATIENT
Start: 2019-02-27 | End: 2019-09-19 | Stop reason: SDUPTHER

## 2019-02-27 RX ORDER — ATORVASTATIN CALCIUM 40 MG/1
TABLET, FILM COATED ORAL
Qty: 90 TABLET | Refills: 1 | Status: SHIPPED | OUTPATIENT
Start: 2019-02-27 | End: 2019-09-19 | Stop reason: SDUPTHER

## 2019-02-27 RX ORDER — ASPIRIN 81 MG/1
TABLET, COATED ORAL
Qty: 90 TABLET | Refills: 1 | Status: SHIPPED | OUTPATIENT
Start: 2019-02-27 | End: 2019-09-19 | Stop reason: SDUPTHER

## 2019-02-28 RX ORDER — NITROFURANTOIN MACROCRYSTALS 50 MG/1
CAPSULE ORAL
Refills: 3 | Status: ON HOLD | COMMUNITY
Start: 2019-02-12 | End: 2019-11-25 | Stop reason: HOSPADM

## 2019-02-28 RX ORDER — UBIDECARENONE 100 MG
CAPSULE ORAL
Refills: 3 | COMMUNITY
Start: 2019-02-15 | End: 2019-02-28

## 2019-03-01 ENCOUNTER — APPOINTMENT (OUTPATIENT)
Dept: CARDIAC REHAB | Age: 59
End: 2019-03-01
Payer: MEDICARE

## 2019-03-04 ENCOUNTER — HOSPITAL ENCOUNTER (OUTPATIENT)
Dept: CARDIAC REHAB | Age: 59
Setting detail: THERAPIES SERIES
Discharge: HOME | End: 2019-03-04
Payer: MEDICARE

## 2019-03-04 ENCOUNTER — APPOINTMENT (OUTPATIENT)
Dept: CARDIAC REHAB | Age: 59
End: 2019-03-04
Payer: MEDICARE

## 2019-03-04 PROCEDURE — 93798 PHYS/QHP OP CAR RHAB W/ECG: CPT

## 2019-03-08 ENCOUNTER — APPOINTMENT (OUTPATIENT)
Dept: CARDIAC REHAB | Age: 59
End: 2019-03-08
Payer: MEDICARE

## 2019-03-11 ENCOUNTER — HOSPITAL ENCOUNTER (OUTPATIENT)
Dept: CARDIAC REHAB | Age: 59
Setting detail: THERAPIES SERIES
Discharge: HOME | End: 2019-03-11
Payer: MEDICARE

## 2019-03-11 ENCOUNTER — APPOINTMENT (OUTPATIENT)
Dept: CARDIAC REHAB | Age: 59
End: 2019-03-11
Payer: MEDICARE

## 2019-03-11 ENCOUNTER — CARE COORDINATION (OUTPATIENT)
Dept: CARE COORDINATION | Age: 59
End: 2019-03-11

## 2019-03-11 PROCEDURE — 93798 PHYS/QHP OP CAR RHAB W/ECG: CPT

## 2019-03-12 ENCOUNTER — HOSPITAL ENCOUNTER (OUTPATIENT)
Dept: GENERAL RADIOLOGY | Age: 59
Discharge: HOME | End: 2019-03-14
Payer: MEDICARE

## 2019-03-12 DIAGNOSIS — M79.671 FOOT PAIN, RIGHT: ICD-10-CM

## 2019-03-12 PROCEDURE — 73630 X-RAY EXAM OF FOOT: CPT

## 2019-03-13 DIAGNOSIS — E11.40 CHRONIC PAINFUL DIABETIC NEUROPATHY (HCC): ICD-10-CM

## 2019-03-13 RX ORDER — NYSTATIN 100000 [USP'U]/G
POWDER TOPICAL
Qty: 60 G | Refills: 3 | Status: SHIPPED | OUTPATIENT
Start: 2019-03-13 | End: 2019-12-27

## 2019-03-13 RX ORDER — ARIPIPRAZOLE 5 MG/1
TABLET ORAL
Qty: 90 TABLET | Refills: 0 | Status: CANCELLED | OUTPATIENT
Start: 2019-03-13

## 2019-03-13 RX ORDER — PREGABALIN 75 MG/1
75 CAPSULE ORAL 2 TIMES DAILY
Qty: 60 CAPSULE | Refills: 2 | Status: SHIPPED | OUTPATIENT
Start: 2019-03-13 | End: 2019-03-19 | Stop reason: SDUPTHER

## 2019-03-15 ENCOUNTER — APPOINTMENT (OUTPATIENT)
Dept: CARDIAC REHAB | Age: 59
End: 2019-03-15
Payer: MEDICARE

## 2019-03-18 ENCOUNTER — APPOINTMENT (OUTPATIENT)
Dept: CARDIAC REHAB | Age: 59
End: 2019-03-18
Payer: MEDICARE

## 2019-03-22 ENCOUNTER — APPOINTMENT (OUTPATIENT)
Dept: CARDIAC REHAB | Age: 59
End: 2019-03-22
Payer: MEDICARE

## 2019-03-25 ENCOUNTER — APPOINTMENT (OUTPATIENT)
Dept: CARDIAC REHAB | Age: 59
End: 2019-03-25
Payer: MEDICARE

## 2019-03-29 ENCOUNTER — APPOINTMENT (OUTPATIENT)
Dept: CARDIAC REHAB | Age: 59
End: 2019-03-29
Payer: MEDICARE

## 2019-04-01 ENCOUNTER — HOSPITAL ENCOUNTER (OUTPATIENT)
Dept: CARDIAC REHAB | Age: 59
Setting detail: THERAPIES SERIES
Discharge: HOME | End: 2019-04-01
Payer: MEDICARE

## 2019-04-01 PROCEDURE — 93798 PHYS/QHP OP CAR RHAB W/ECG: CPT

## 2019-04-02 ENCOUNTER — OFFICE VISIT (OUTPATIENT)
Dept: INTERNAL MEDICINE CLINIC | Age: 59
End: 2019-04-02
Payer: MEDICARE

## 2019-04-02 VITALS
DIASTOLIC BLOOD PRESSURE: 66 MMHG | RESPIRATION RATE: 18 BRPM | HEIGHT: 67 IN | OXYGEN SATURATION: 96 % | SYSTOLIC BLOOD PRESSURE: 111 MMHG | WEIGHT: 233.6 LBS | HEART RATE: 70 BPM | BODY MASS INDEX: 36.66 KG/M2 | TEMPERATURE: 98.2 F

## 2019-04-02 DIAGNOSIS — Z79.4 CONTROLLED TYPE 2 DIABETES MELLITUS WITH DIABETIC NEUROPATHY, WITH LONG-TERM CURRENT USE OF INSULIN (HCC): Primary | ICD-10-CM

## 2019-04-02 DIAGNOSIS — E11.40 CONTROLLED TYPE 2 DIABETES MELLITUS WITH DIABETIC NEUROPATHY, WITH LONG-TERM CURRENT USE OF INSULIN (HCC): Primary | ICD-10-CM

## 2019-04-02 DIAGNOSIS — E78.5 HYPERLIPIDEMIA, UNSPECIFIED HYPERLIPIDEMIA TYPE: ICD-10-CM

## 2019-04-02 PROCEDURE — 3017F COLORECTAL CA SCREEN DOC REV: CPT | Performed by: INTERNAL MEDICINE

## 2019-04-02 PROCEDURE — 99213 OFFICE O/P EST LOW 20 MIN: CPT | Performed by: INTERNAL MEDICINE

## 2019-04-02 PROCEDURE — 3044F HG A1C LEVEL LT 7.0%: CPT | Performed by: INTERNAL MEDICINE

## 2019-04-02 PROCEDURE — G8427 DOCREV CUR MEDS BY ELIG CLIN: HCPCS | Performed by: INTERNAL MEDICINE

## 2019-04-02 PROCEDURE — 2022F DILAT RTA XM EVC RTNOPTHY: CPT | Performed by: INTERNAL MEDICINE

## 2019-04-02 PROCEDURE — 1036F TOBACCO NON-USER: CPT | Performed by: INTERNAL MEDICINE

## 2019-04-02 PROCEDURE — G8417 CALC BMI ABV UP PARAM F/U: HCPCS | Performed by: INTERNAL MEDICINE

## 2019-04-02 PROCEDURE — G8598 ASA/ANTIPLAT THER USED: HCPCS | Performed by: INTERNAL MEDICINE

## 2019-04-02 RX ORDER — BLOOD-GLUCOSE METER
KIT MISCELLANEOUS
Qty: 1 DEVICE | Refills: 5 | Status: SHIPPED | OUTPATIENT
Start: 2019-04-02 | End: 2019-12-27

## 2019-04-02 RX ORDER — LANCETS 28 GAUGE
EACH MISCELLANEOUS
Qty: 150 EACH | Refills: 5 | Status: SHIPPED | OUTPATIENT
Start: 2019-04-02 | End: 2019-12-27

## 2019-04-02 RX ORDER — BLOOD-GLUCOSE METER
KIT MISCELLANEOUS
Qty: 200 EACH | Refills: 5 | Status: SHIPPED | OUTPATIENT
Start: 2019-04-02 | End: 2019-05-04 | Stop reason: SDUPTHER

## 2019-04-02 ASSESSMENT — ENCOUNTER SYMPTOMS
ABDOMINAL PAIN: 0
EYE PAIN: 0
CHOKING: 0
CHEST TIGHTNESS: 0
TROUBLE SWALLOWING: 0
SHORTNESS OF BREATH: 0
COUGH: 0
BLOOD IN STOOL: 0
BLURRED VISION: 0

## 2019-04-02 NOTE — PROGRESS NOTES
Jonel Barr is a 62 y.o. female with coronary artery disease, history of strokes, history of seizures, hypertension, obesity, neuropathy, psychosis, who presents with     Chief Complaint   Patient presents with    Diabetes     A1C 6.7 as done 19 BG done at home 170    Hyperlipidemia       Interim history: Since her last office visit with me 4 months ago, the patient continued to live independently with no emergencies, accidents, hospital admissions. Has followed with her cardiologist, nurse care coordinator and nephrologist.   Her twin sister  just 2 weeks ago after complications from heart surgery. Medication refills requests were received by the office and done electronically. The following laboratory reports since the past visit were reviewed (the ones pertinent to today's visit were discussed with the patient/ caregiver):    Orders Only on 2019   Component Date Value Ref Range Status    Sodium 2019 143  135 - 144 mEq/L Final    Comment: Effective:  2019  New reference range for this analyte has been established.  Potassium 2019 4.3  3.4 - 4.9 mEq/L Final    Comment: Effective:  2019  New reference range for this analyte has been established.  Chloride 2019 107  95 - 107 mEq/L Final    Comment: Effective:  2019  New reference range for this analyte has been established.  CO2 2019 22  20 - 31 mEq/L Final    Comment: Effective:  2019  New reference range for this analyte has been established.  Anion Gap 2019 14  9 - 15 mEq/L Final    Comment: Effective:  2019  New reference range for this analyte has been established.  Glucose 2019 155* 70 - 99 mg/dL Final    Comment: Effective:  2019  New reference range for this analyte has been established.       BUN 2019 32* 8 - 23 mg/dL Final    CREATININE 2019 1.96* 0.50 - 0.90 mg/dL Final    GFR Non- 2019 25.9* >60 Final Comment: >60 mL/min/1.73m2 EGFR, calc. for ages 25 and older using the  MDRD formula (not corrected for weight), is valid for stable  renal function.  GFR  02/26/2019 31.4* >60 Final    Comment: >60 mL/min/1.73m2 EGFR, calc. for ages 25 and older using the  MDRD formula (not corrected for weight), is valid for stable  renal function.  Calcium 02/26/2019 9.9  8.5 - 9.9 mg/dL Final    Comment: Effective:  2/7/2019  New reference range for this analyte has been established.  Total Protein 02/26/2019 6.8  6.3 - 8.0 g/dL Final    Comment: Effective:  2/7/2019  New reference range for this analyte has been established.  Alb 02/26/2019 3.2* 3.5 - 4.6 g/dL Final    Comment: Effective:  2/7/2019  New reference range for this analyte has been established.  Total Bilirubin 02/26/2019 0.3  0.2 - 0.7 mg/dL Final    Comment: Effective:  2/7/2019  New reference range for this analyte has been established.  Alkaline Phosphatase 02/26/2019 100  40 - 130 U/L Final    ALT 02/26/2019 12  0 - 33 U/L Final    AST 02/26/2019 15  0 - 35 U/L Final    Globulin 02/26/2019 3.6* 2.3 - 3.5 g/dL Final   Orders Only on 02/26/2019   Component Date Value Ref Range Status    Cholesterol, Total 02/26/2019 137  0 - 199 mg/dL Final    ATP III Cholesterol classification is Desirable.  Triglycerides 02/26/2019 116  0 - 150 mg/dL Final    Comment: ATP III Triglycerides Classification is Normal.  Effective:  2/7/2019  New reference range for this analyte has been established.  HDL 02/26/2019 55  40 - 59 mg/dL Final    Comment: ATP III HDL Cholesterol Classification is Desirable. Expected Values:    Males:    >55 = No Risk            35-55 = Moderate Risk            <35 = High Risk    Females:  >65 = No Risk            45-65 = Moderate Risk            <45 = High Risk    NCEP Guidelines:   Third Report May 2001  >59 = negative risk factor for CHD  <40 = major risk factor for CHD      LDL Calculated 02/26/2019 59  0 - 129 mg/dL Final    ATP III LDL Classification is Optimal.   Orders Only on 02/26/2019   Component Date Value Ref Range Status    Hemoglobin A1C 02/26/2019 6.7* 4.8 - 5.9 % Final   Orders Only on 02/26/2019   Component Date Value Ref Range Status    WBC 02/26/2019 11.0* 4.8 - 10.8 K/uL Final    RBC 02/26/2019 3.88* 4.20 - 5.40 M/uL Final    Hemoglobin 02/26/2019 10.6* 12.0 - 16.0 g/dL Final    Hematocrit 02/26/2019 30.7* 37.0 - 47.0 % Final    MCV 02/26/2019 79.2* 82.0 - 100.0 fL Final    MCH 02/26/2019 27.2  27.0 - 31.3 pg Final    MCHC 02/26/2019 34.4  33.0 - 37.0 % Final    RDW 02/26/2019 15.1* 11.5 - 14.5 % Final    Platelets 99/96/1845 258  130 - 400 K/uL Final       HPI:    Diabetes   She presents for her follow-up diabetic visit. She has type 2 diabetes mellitus. Her disease course has been fluctuating. Hypoglycemia symptoms include hunger. Pertinent negatives for hypoglycemia include no confusion, dizziness, nervousness/anxiousness or speech difficulty. Associated symptoms include foot paresthesias. Pertinent negatives for diabetes include no blurred vision, no chest pain, no fatigue, no foot ulcerations, no polydipsia, no polyphagia, no polyuria, no weakness and no weight loss. There are no hypoglycemic complications. Diabetic complications include a CVA, nephropathy and peripheral neuropathy. Risk factors for coronary artery disease include diabetes mellitus, hypertension, obesity, sedentary lifestyle and post-menopausal. Current diabetic treatment includes intensive insulin program and oral agent (monotherapy). She is compliant with treatment most of the time. Her weight is fluctuating minimally. She is following a generally healthy diet. Meal planning includes avoidance of concentrated sweets. She has had a previous visit with a dietitian. She rarely (walking) participates in exercise. Her breakfast blood glucose range is generally >200 mg/dl.  An ACE inhibitor/angiotensin II receptor blocker is being taken. She sees a podiatrist.Eye exam is not current. Hyperlipidemia   This is a chronic problem. The current episode started more than 1 year ago. Recent lipid tests were reviewed and are variable. Exacerbating diseases include chronic renal disease, diabetes and obesity. Factors aggravating her hyperlipidemia include fatty foods and atypical antipsychotics. Pertinent negatives include no chest pain, myalgias or shortness of breath. Current antihyperlipidemic treatment includes statins. The current treatment provides moderate improvement of lipids. Compliance problems include adherence to diet and adherence to exercise. Risk factors for coronary artery disease include diabetes mellitus, hypertension, obesity, a sedentary lifestyle and post-menopausal.       More detail above in the chiefcomplaint(s), interim history and below in the review of systems. Past Medical History:   Diagnosis Date    Anxiety     CAD S/P percutaneous coronary angioplasty 2015, 2018    stent per dr Erick Randle    Diabetic nephropathy with proteinuria ([facility] [address] 2014    DJD (degenerative joint disease) of knee     Dr Jay Guerra GERD (gastroesophageal reflux disease)     Hemiparesis, left ([address] 2013    entered Assisted Living ([facility])    History of seizures     History of type C viral hepatitis     HTN (hypertension)     Hyperlipidemia     Impaired mobility and activities of daily living     Mediastinal lymphadenopathy 2013    Dr Veronica Clarke, Jen Giordano    Metabolic syndrome     Neurogenic urinary incontinence 2013    Neuropathy in diabetes ([address]     Obesity (BMI 30-39. 9)     Recurrent UTI     S/P colonoscopy 2014    [facility], focal active colitis    Schizophrenia, paranoid, chronic ([address]     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 Sivan Do      x1 Dr. Rayne Valdivia, Dr Pallavi Reno 2018    HYSTERECTOMY, TOTAL ABDOMINAL      one ovary intact, Dr Manuel Castro, menorrhagia    HI TOTAL KNEE ARTHROPLASTY Left 2018    LEFT KNEE TOTAL KNEE ARTHROPLASTY, SHAYNA, NERVE BLOCK performed by Leah Hanson MD at [address] [place] [address] Right     TOTAL KNEE ARTHROPLASTY  16    Dr Lidia Venegas History     Socioeconomic History    Marital status:      Spouse name: Not on file    Number of children: 2    Years of education: Not on file    Highest education level: Not on file   Occupational History    Occupation: disabled   Social Needs    Financial resource strain: Not on file    Food insecurity:     Worry: Not on file     Inability: Not on file   Aplica needs:     Medical: Not on file     Non-medical: Not on file   Tobacco Use    Smoking status: Passive Smoke Exposure - Never Smoker    Smokeless tobacco: Never Used   Substance and Sexual Activity    Alcohol use: No     Alcohol/week: 0.0 oz    Drug use: No    Sexual activity: Not Currently   Lifestyle    Physical activity:     Days per week: Not on file     Minutes per session: Not on file    Stress: Not on file   Relationships    Social connections:     Talks on phone: Not on file     Gets together: Not on file     Attends [employer] service: Not on file     Active member of club or organization: Not on file     Attends meetings of clubs or organizations: Not on file     Relationship status: Not on file    Intimate partner violence:     Fear of current or ex partner: Not on file     Emotionally abused: Not on file     Physically abused: Not on file     Forced sexual activity: Not on file   Other Topics Concern    Not on file   Social History Narrative    Born in [place], one of 5    Twin sister Selma Pedersen, very ill in 2018, [place] [address]    Moved to [facility], , 2 children, one son and one daughter    Worked at [employer], as a nurse's aide    Disabled due to mental illness    Lived at [facility], was discharged, returned to independent living in 2017 in the daughter's house and has adjusted well    One son and one daughter, live in the same house with patient, Radha Arreola pays the rent    [employer]       Family History   Problem Relation Age of Onset    Cancer Mother 76        survived    Hypertension Father     Diabetes Sister     Mental Illness Sister        Family and socialhistory were reviewed and pertinent changes documented.     ALLERGIES    Codeine    Current Outpatient Medications on File Prior to Visit   Medication Sig Dispense Refill    LYRICA 75 MG capsule TAKE ONE (1) CAPSULE BY MOUTH TWICE DAILY 60 capsule 2    ARIPiprazole (ABILIFY) 5 MG tablet TAKE 1 TABLET BY MOUTH DAILY 90 tablet 1    nystatin (MYCOSTATIN) 899541 UNIT/GM powder APPLY TO ABDOMINAL FOLDS EVERY 12 HOURS AS NEEDED 60 g 3    sertraline (ZOLOFT) 50 MG tablet TAKE (1) TABLET BY MOUTH DAILY 30 tablet 5    Insulin Pen Needle (BD AUTOSHIELD DUO) 30G X 5 MM MISC USE AS DIRECTED 2 TIMES DAILY FOR USE WITH LANTUS SOLOSTAR, 100 each 3    nitrofurantoin (MACRODANTIN) 50 MG capsule TAKE 1 CAPSULE BY MOUTH DAILY  3    traZODone (DESYREL) 50 MG tablet TAKE ONE OR TWO TABLETS BY MOUTH EVERY NIGHT AT BEDTIME AS NEEDED FOR INSOMNIA 180 tablet 1    ASPIRIN LOW DOSE 81 MG EC tablet TAKE (1) TABLET BY MOUTH DAILY 90 tablet 1    atorvastatin (LIPITOR) 40 MG tablet TAKE 1 TABLET BY MOUTH NIGHTLY 90 tablet 1    insulin aspart (NOVOLOG FLEXPEN) 100 UNIT/ML injection pen Inject subcut 18 units before breakfast and dinner, 8 units before lunch 3 mL 2  insulin glargine (BASAGLAR KWIKPEN) 100 UNIT/ML injection pen Inject 23 Units into the skin 2 times daily 6 pen 2    Cholecalciferol (VITAMIN D3) 1000 units TABS TAKE (1) TABLET BY MOUTH DAILY 90 tablet 3    TRADJENTA 5 MG tablet Take 1 tablet by mouth daily 30 tablet 5    carvedilol (COREG) 6.25 MG tablet TAKE ONE (1) TABLET BY MOUTH TWICE DAILY WITH MEALS 180 tablet 1    clopidogrel (PLAVIX) 75 MG tablet Take 1 tablet by mouth daily 90 tablet 2    isosorbide mononitrate (IMDUR) 30 MG extended release tablet TAKE 1 TABLET BY MOUTH DAILY 30 tablet 5    acetaminophen (TYLENOL) 500 MG tablet TAKE 1 TABLET BY MOUTH EVERY 6 HOURS AS NEEDED FOR PAIN OR FEVER 120 tablet 3    omeprazole (PRILOSEC) 20 MG delayed release capsule TAKE 1 CAPSULE BY MOUTH ONCE DAILY AS NEEDED FOR HEARTBURN 90 capsule 1    nitroGLYCERIN (NITROSTAT) 0.4 MG SL tablet DISSOLVE 1 TAB UNDER THE TONGUE AS NEEDED FOR CHEST PAIN EVERY 5 MINUTES UP TO 3 TIMES. IF NO RELIEF CALL 911. 75 tablet 1    Blood Pressure KIT 1 Device by Does not apply route daily 1 kit 0    Alcohol Swabs (EASY TOUCH ALCOHOL PREP MEDIUM) 70 % PADS USE AS DIRECTED THREE TIMES A  each 3     No current facility-administered medications on file prior to visit. Review of Systems   Constitutional: Negative for appetite change, fatigue, unexpected weight change and weight loss. HENT: Negative for congestion, nosebleeds and trouble swallowing. Eyes: Negative for blurred vision and pain. Respiratory: Negative for cough, choking, chest tightness and shortness of breath. Cardiovascular: Negative for chest pain, palpitations and leg swelling. Gastrointestinal: Negative for abdominal pain and blood in stool. Endocrine: Negative for polydipsia, polyphagia and polyuria. Genitourinary: Positive for frequency. Negative for dysuria and hematuria. Musculoskeletal: Negative for joint swelling, myalgias and neck pain. Skin: Negative for rash and wound. Allergic/Immunologic: Negative for immunocompromised state. Neurological: Positive for numbness. Negative for dizziness, syncope, speech difficulty, weakness and light-headedness. Psychiatric/Behavioral: Negative for confusion, decreased concentration, dysphoric mood, hallucinations and sleep disturbance. The patient is not nervous/anxious. Vitals:    04/02/19 1101   BP: 111/66   Site: Left Upper Arm   Position: Sitting   Cuff Size: Large Adult   Pulse: 70   Resp: 18   Temp: 98.2 °F (36.8 °C)   TempSrc: Tympanic   SpO2: 96%   Weight: 233 lb 9.6 oz (106 kg)   Height: 5' 7\" (1.702 m)       Physical Exam   Constitutional: She is oriented to person, place, and time. No distress. Obese, age appropriate, well groomed     HENT:   Head: Atraumatic. Eyes: Conjunctivae and EOM are normal. No scleral icterus. Neck: Neck supple. No JVD present. Cardiovascular: Regular rhythm and normal heart sounds. Exam reveals no gallop. Pulmonary/Chest: Effort normal and breath sounds normal.   Abdominal: Soft. She exhibits no distension. There is no tenderness. Exam limited due to abdominal adiposity      Musculoskeletal: Normal range of motion. Walks with cane assistance   Neurological: She is alert and oriented to person, place, and time. Coordination normal.   Skin: Skin is warm. No pallor. Psychiatric: Her speech is normal. Her mood appears not anxious. Her affect is not labile. She is not slowed and not withdrawn. Thought content is not paranoid and not delusional. She does not express inappropriate judgment. She does not exhibit a depressed mood. She exhibits normal recent memory and normal remote memory. Good insight and motivation   Mood stable, no evidence of cognitive deficit She is attentive. Assessment:    Michelle was seen today for diabetes and hyperlipidemia.     Diagnoses and all orders for this visit:    Controlled type 2 diabetes mellitus with diabetic neuropathy, with long-term current use of insulin (HCC)              Due to reported occasional hypoglycemia, and due to increased appetite in general, will decrease basal insulin to 20 units twice a day  The patient is frustrated for not being able to lose weight. She is not a good candidate for metformin or Victoza due to low GFR. Continue to monitor kidney function and weight, reevaluate treatment at the next visit in 3 months      Hyperlipidemia, unspecified hyperlipidemia type              On statin which is well tolerated, continue to monitor lipids    Other orders  -     FREESTYLE LITE strip; TEST BG 4 X DAILY Dx:E11.9, INSULIN DEPENDENT  -     FREESTYLE LANCETS MISC; Test blood sugar 4 times daily (AC and HS)  -     Blood Glucose Monitoring Suppl (FREESTYLE LITE) CORETTA; use as directed        Plan:    Reviewed with the patient (/and caregiver if present): current health status, medications, activities and diet. See also orders and comments in the assessment section. Today's diagnosis (in the context ofchronic problems) was discussed with patient (/and caregiver if present), questions answered. Orders Placed This Encounter   Medications    FREESTYLE LITE strip     Sig: TEST BG 4 X DAILY Dx:E11.9, INSULIN DEPENDENT     Dispense:  200 each     Refill:  5    FREESTYLE LANCETS MISC     Sig: Test blood sugar 4 times daily (AC and HS)     Dispense:  150 each     Refill:  5    Blood Glucose Monitoring Suppl (FREESTYLE LITE) CORETTA     Sig: use as directed     Dispense:  1 Device     Refill:  5       No orders of the defined types were placed in this encounter. Close follow up needed to evaluate treatment results and for care coordination. Return in about 3 months (around 7/2/2019). I have reviewed the patient's medical and surgical, family and social history, health maintenance schedule, and updated the computerized patient record. Please note this report has been partially produced by using speech recognition hardware.  It may contain errors related to the system, including grammar, punctuation and spelling as well as words and phrases that may seem inaccurate. For anyquestions or concerns, please feel free to contact me for clarification.         Electronically signed by Sharifa Barriga MD

## 2019-04-04 ENCOUNTER — TELEPHONE (OUTPATIENT)
Dept: INTERNAL MEDICINE CLINIC | Age: 59
End: 2019-04-04

## 2019-04-04 NOTE — TELEPHONE ENCOUNTER
Pt states she needs a form filled out by the doctor for test strips sent to pharmacy. Pt wants called when [facility] to the [facility].   She wants it to go to [facility] on [facility]

## 2019-04-05 ENCOUNTER — HOSPITAL ENCOUNTER (OUTPATIENT)
Dept: CARDIAC REHAB | Age: 59
Setting detail: THERAPIES SERIES
Discharge: HOME | End: 2019-04-05
Payer: MEDICARE

## 2019-04-05 ENCOUNTER — OFFICE VISIT (OUTPATIENT)
Dept: BEHAVIORAL/MENTAL HEALTH CLINIC | Age: 59
End: 2019-04-05
Payer: MEDICARE

## 2019-04-05 DIAGNOSIS — F32.89 OTHER DEPRESSION: Primary | ICD-10-CM

## 2019-04-05 DIAGNOSIS — F20.9 SCHIZOPHRENIA, CHRONIC CONDITION (HCC): ICD-10-CM

## 2019-04-05 PROCEDURE — 90832 PSYTX W PT 30 MINUTES: CPT | Performed by: PSYCHOLOGIST

## 2019-04-05 PROCEDURE — 93798 PHYS/QHP OP CAR RHAB W/ECG: CPT

## 2019-04-05 ASSESSMENT — PATIENT HEALTH QUESTIONNAIRE - PHQ9
3. TROUBLE FALLING OR STAYING ASLEEP: 1
SUM OF ALL RESPONSES TO PHQ QUESTIONS 1-9: 4
6. FEELING BAD ABOUT YOURSELF - OR THAT YOU ARE A FAILURE OR HAVE LET YOURSELF OR YOUR FAMILY DOWN: 0
8. MOVING OR SPEAKING SO SLOWLY THAT OTHER PEOPLE COULD HAVE NOTICED. OR THE OPPOSITE, BEING SO FIGETY OR RESTLESS THAT YOU HAVE BEEN MOVING AROUND A LOT MORE THAN USUAL: 1
2. FEELING DOWN, DEPRESSED OR HOPELESS: 2
7. TROUBLE CONCENTRATING ON THINGS, SUCH AS READING THE NEWSPAPER OR WATCHING TELEVISION: 0
SUM OF ALL RESPONSES TO PHQ QUESTIONS 1-9: 4
9. THOUGHTS THAT YOU WOULD BE BETTER OFF DEAD, OR OF HURTING YOURSELF: 0
4. FEELING TIRED OR HAVING LITTLE ENERGY: 0
1. LITTLE INTEREST OR PLEASURE IN DOING THINGS: 0
5. POOR APPETITE OR OVEREATING: 0
10. IF YOU CHECKED OFF ANY PROBLEMS, HOW DIFFICULT HAVE THESE PROBLEMS MADE IT FOR YOU TO DO YOUR WORK, TAKE CARE OF THINGS AT HOME, OR GET ALONG WITH OTHER PEOPLE: 1
SUM OF ALL RESPONSES TO PHQ9 QUESTIONS 1 & 2: 2

## 2019-04-05 NOTE — PATIENT INSTRUCTIONS
Talk with your daughter about setting up a schedule and routine for laundry. See below for some additional tips on dealing with grief. Bereavement, Grief, and Mourning  Bereavement is the state of having lost a significant other to death. Grief is the personal response to the loss. Mourning is the public expression of that loss. What Is Normal Grief? Grief reactions vary depending on who we are, who we lost, our relationship with that person, the circumstances around their passing, and how much their loss affects our day-to-day functioning. Different people may express grief differently; you may even have different grief responses between one loss and another. Reactions to grief and loss include not just emotional symptoms but also behavioral and physical symptoms. These reactions often change over time. All are normal for a short period. The following are some of the symptoms you may experience:   Emotional. Shock, denial, numbness, sadness, anxiety, guilt, fear, anger, irritability.  Behavioral. Crying unexpectedly, sleep changes, not eating, withdrawing from others, restlessness, trouble making decisions.  Physical. Concentration problems, exhaustion or fatigue, decreased energy, memory problems, upset stomach, pain, and headaches. Symptoms that are not normal and may signal the need to talk to a professional include, use of drugs or alcohol, violence, and thoughts of killing oneself. Duration  The duration of grief varies from person to person. Research shows that the average recovery time is 18 to 24 months. Grief reactions can be stronger around significant dates, like the anniversary of the persons death, birthdays, and holidays. Giving Yourself Time to Terence Hodges  It is normal and important to express your grief and to work through the concerns that arise for you at this time. Avoiding your feelings may not be helpful and may delay or prolong your grief.  The following are some suggestions writings of others. Preserve whatever you find out. Celebrate your time together.  Find a way to memorialize your loved one. Planting a tree or garden in the name of your loved one, dedicating a work to their memory, contributing to a geno in their name, and other such activities can be helpful.  Begin your day with your loved one. If your grief is young, youll probably wake up thinking of that person anyway. So why not decide that youll include her or him from the start? Focus this time in a positive way. Bring to your mind fulfilling memories. Recall lessons that this person taught you, gifts he or she gave you. Think about how you can spend your day in ways that would be keeping in with your loved ones best self and with your best self. Then carry that best self with you through your day.  Donate their possessions meaningfully. Whether you give your loved ones personal possessions to someone you know or to a stranger, find ways to pass these things along so that others might benefit from them. Family members of friends might like to receive keepsakes. They or others might deserve tools, utensils, books or sporting equipment. 7-bites can put clothes to good use. Some wish to do this quickly following the death, while others wish to wait awhile.  Donate in the others name. Honor the [employer] and spirit by giving a gift or gifts to a cause the other would appreciate. A favorite geno? A local ? A building project? Extend that persons influence even further.  Visit the grave. Not all people prefer to do this. But if it feels right to you, then do so. Dont let others convince you this is a morbid thing to do. Spend whatever time feels right there. Stand or sit in the quietness and do what comes naturally: be silent or talk, breathe deeply or cry, recollect or pray.   You may wish to add your distinctive touch to the [place] - straighten it a bit, or add little signs of your love.  Ask for a copy of the Joanna's. If the  liturgy or memorial service holds special meaning for you because of what was spoken or read, ask for the words. Whoever participated in that ritual will feel gratified that what they prepared was appreciated. Turn to these words whenever you want. Some people find these thoughts provide even more help weeks and months after the service.  Give yourself respites from your grief. Just because youre grieving doesnt mean you must always be feeling sad or forlorn. Theres value in sometimes consciously deciding that youll think about something else for awhile, or that youll do something youve always enjoyed doing. Sometimes this happens naturally and its only later you realize that your grief has taken a back seat. Let it, this is not an indication you love that person any less or that youre forgetting them. Its a sign that youre human and you need relief from the unrelenting pressure. It can also be a healthy sign youre healing.  Give thanks every day. Whatever has happened to you, you still have things to be thankful for. Perhaps its your memories, your remaining family, your support, your work; you own health - all sorts of things. Draw your attention to those parts of life that are worth appreciating, then appreciate them.  Monitor signs of dependency. While its normal to become more dependent upon others for awhile immediately after a death, it will not be helpful to continue in that role long-term. Watch for signs that youre prolonging your need for assistance. Congratulate yourself when you do things for yourself.  Consider joining a grief-support group, contacting a grief counselor, or speak to a cleargy person for additional support and help. Remember that depressive symptoms (e.g., feeling sad) are a fundamental part of normal bereavement.  Staying active and finding support from others can help you to work through the grief process.  Read of how others have responded to a loved ones death. You may feel that your own grief is all you can handle. But if youd like to look at the ways others have done it, try:     Beyond Grief:  A Guide for Recovering from the Death of a Loved One     by Peggy Gutiérrez Remembering with Love by Lorrie Joseph and [address] [facility] [address] by Larry Rodriguez and Jennifer Dale The Grief Recovery Handbook: The Action Program for Moving     Beyond Death, Divorce, & Other Losses by Daisha Charles       A Grief Observed by [place] Griffithserel  by Farzaneh Terrell When Good-Bye Is Forever by Veronica Galola       Men and Grief by Wendy Encinas for a Son. There are many others. Check with a . Adapted from ANNA Prakash., Harjeet Wayne M.S., Yuliana(2009). Integrated Behavioral Health in [facility] and [facility] healthcare (2013). Grief & Mourning Ver3. 0.

## 2019-04-05 NOTE — PROGRESS NOTES
Behavioral Health Consultation  Radha Navarro Psy.D. Psychologist  19  3:39 PM      Time spent with Patient: 30 minutes  This is patient's fourth  [facility] appointment. Reason for Consult:  depression  Referring Provider: Cee Goodman MD      Feedback given to PCP. S:  Pt reports that her twin sister  two weeks ago. Her sister had been in a nursing home and then hospice and then she was brought home for the last 5 days and the pt was able to be there for her last days. Pt reports that she has had a lower mood than usual as a result, but feels relieved that her sister is no longer suffering. She notes that she had accepted her sister's impending death for some time. Pt expressed some concern that she was not able to cry about it. Discussed some possible reasons for this. Pt reported that things at home are going ok, but expressed some frustration with her daughter due to not attending to laundry. Pt reported that she spends a lot of time with her son who visits with her every day. Pt also reported that she is back to doing cardiac rehab and feels this is going well. Pt expressed interested in following up in one month.   Pt denied SI and HI.    O:  MSE:    Appearance    alert, cooperative  Appetite normal  Sleep disturbance occasional  Fatigue No  Loss of pleasure No  Impulsive behavior No  Speech    spontaneous, normal rate and normal volume  Mood    neutral  Affect    flat affect  Thought Content    intact  Thought Process    linear, goal directed and coherent  Associations    logical connections  Insight    Fair  Judgment    Intact  Orientation    oriented to person, place, time, and general circumstances  Memory    recent and remote memory intact  Attention/Concentration    intact  Morbid ideation No  Suicide Assessment    no suicidal ideation      History:    Medications:   Current Outpatient Medications   Medication Sig Dispense Refill    FREESTYLE LITE strip TEST BG 4 X DAILY Dx:E11.9, INSULIN DEPENDENT 200 each 5    FREESTYLE LANCETS MISC Test blood sugar 4 times daily (AC and HS) 150 each 5    Blood Glucose Monitoring Suppl (FREESTYLE LITE) CORETTA use as directed 1 Device 5    insulin glargine (BASAGLAR KWIKPEN) 100 UNIT/ML injection pen Inject 20 Units into the skin 2 times daily 6 pen 2    LYRICA 75 MG capsule TAKE ONE (1) CAPSULE BY MOUTH TWICE DAILY 60 capsule 2    ARIPiprazole (ABILIFY) 5 MG tablet TAKE 1 TABLET BY MOUTH DAILY 90 tablet 1    nystatin (MYCOSTATIN) 198288 UNIT/GM powder APPLY TO ABDOMINAL FOLDS EVERY 12 HOURS AS NEEDED 60 g 3    sertraline (ZOLOFT) 50 MG tablet TAKE (1) TABLET BY MOUTH DAILY 30 tablet 5    Insulin Pen Needle (BD AUTOSHIELD DUO) 30G X 5 MM MISC USE AS DIRECTED 2 TIMES DAILY FOR USE WITH LANTUS SOLOSTAR, 100 each 3    nitrofurantoin (MACRODANTIN) 50 MG capsule TAKE 1 CAPSULE BY MOUTH DAILY  3    traZODone (DESYREL) 50 MG tablet TAKE ONE OR TWO TABLETS BY MOUTH EVERY NIGHT AT BEDTIME AS NEEDED FOR INSOMNIA 180 tablet 1    ASPIRIN LOW DOSE 81 MG EC tablet TAKE (1) TABLET BY MOUTH DAILY 90 tablet 1    atorvastatin (LIPITOR) 40 MG tablet TAKE 1 TABLET BY MOUTH NIGHTLY 90 tablet 1    insulin aspart (NOVOLOG FLEXPEN) 100 UNIT/ML injection pen Inject subcut 18 units before breakfast and dinner, 8 units before lunch 3 mL 2    Cholecalciferol (VITAMIN D3) 1000 units TABS TAKE (1) TABLET BY MOUTH DAILY 90 tablet 3    TRADJENTA 5 MG tablet Take 1 tablet by mouth daily 30 tablet 5    carvedilol (COREG) 6.25 MG tablet TAKE ONE (1) TABLET BY MOUTH TWICE DAILY WITH MEALS 180 tablet 1    clopidogrel (PLAVIX) 75 MG tablet Take 1 tablet by mouth daily 90 tablet 2    isosorbide mononitrate (IMDUR) 30 MG extended release tablet TAKE 1 TABLET BY MOUTH DAILY 30 tablet 5    acetaminophen (TYLENOL) 500 MG tablet TAKE 1 TABLET BY MOUTH EVERY 6 HOURS AS NEEDED FOR PAIN OR FEVER 120 tablet 3    omeprazole (PRILOSEC) 20 MG delayed release capsule TAKE 1 CAPSULE BY MOUTH ONCE DAILY AS NEEDED FOR HEARTBURN 90 capsule 1    nitroGLYCERIN (NITROSTAT) 0.4 MG SL tablet DISSOLVE 1 TAB UNDER THE TONGUE AS NEEDED FOR CHEST PAIN EVERY 5 MINUTES UP TO 3 TIMES. IF NO RELIEF CALL 911. 75 tablet 1    Blood Pressure KIT 1 Device by Does not apply route daily 1 kit 0    Alcohol Swabs (EASY TOUCH ALCOHOL PREP MEDIUM) 70 % PADS USE AS DIRECTED THREE TIMES A  each 3     No current facility-administered medications for this visit. A:  Administered the PHQ9 which indicates a self report of minimal symptom distress. Pt is not highly symptomatic at this time, but is dealing with grief due to the loss of her sister and would benefit from continued services to address grief reaction and prevent worsening of depressive symptoms. PHQ Scores 4/5/2019 1/8/2019 11/13/2018 10/16/2018 3/22/2018 1/9/2018 11/2/2016   PHQ2 Score 2 0 2 3 0 0 1   PHQ9 Score 4 6 10 14 0 0 4     Interpretation of Total Score Depression Severity: 1-4 = Minimal depression, 5-9 = Mild depression, 10-14 = Moderate depression, 15-19 = Moderately severe depression, 20-27 = Severe depression      Diagnosis:  Other Specified Depressive Disorder with anxious distress  Schizophrenia; paranoid type, BY HISTORY         Diagnosis Date    Anxiety     CAD S/P percutaneous coronary angioplasty 2015, 2018    stent per dr Kenia Anders    Diabetic nephropathy with proteinuria ([facility] [address] 2014    DJD (degenerative joint disease) of knee     Dr Radha Stinson GERD (gastroesophageal reflux disease)     Hemiparesis, left ([facility] [address] 2013    [facility] Ass[facility] Living [facility]    History of seizures     History of type C viral hepatitis     HTN (hypertension)     Hyperlipidemia     Impaired mobility and activities of daily living     Mediastinal lymphadenopathy 2013    Jodie Mercer    Metabolic syndrome     Neurogenic urinary incontinence 2013    Neuropathy in diabetes ([facility] [address]     Obesity (BMI 30-39. 9)     Recurrent UTI

## 2019-04-08 ENCOUNTER — HOSPITAL ENCOUNTER (OUTPATIENT)
Dept: CARDIAC REHAB | Age: 59
Setting detail: THERAPIES SERIES
Discharge: HOME | End: 2019-04-08
Payer: MEDICARE

## 2019-04-08 PROCEDURE — 93798 PHYS/QHP OP CAR RHAB W/ECG: CPT

## 2019-04-15 ENCOUNTER — HOSPITAL ENCOUNTER (OUTPATIENT)
Dept: CARDIAC REHAB | Age: 59
Setting detail: THERAPIES SERIES
Discharge: HOME | End: 2019-04-15
Payer: MEDICARE

## 2019-04-15 PROCEDURE — 93798 PHYS/QHP OP CAR RHAB W/ECG: CPT

## 2019-04-17 ENCOUNTER — CARE COORDINATION (OUTPATIENT)
Dept: CARE COORDINATION | Age: 59
End: 2019-04-17

## 2019-04-17 NOTE — CARE COORDINATION
Ambulatory Care Coordination Note  4/17/2019  CM Risk Score: 5  Andrés Mortality Risk Score:      ACC: Valeri Arzola RN    Summary Note: Care Coordination call placed to patient. Discussed discharge from Care Coordination. Patient verbalized agreement. Patient understands that I will not be reaching out to her on a regular basis, but she may re-enroll should she have future needs. Lab Results   Component Value Date    LABA1C 6.7 (H) 02/26/2019    LABA1C 6.4 09/11/2018    LABA1C 8.4 (H) 06/28/2018        Diabetes Assessment    Medic Alert ID:  No  Meal Planning:  Plate Method, Avoidance of concentrated sweets, Carb counting   How often do you test your blood sugar?:  Meals, Bedtime   Do you have barriers with adherence to non-pharmacologic self-management interventions? (Nutrition/Exercise/Self-Monitoring):  No   Have you ever had to go to the ED for symptoms of low blood sugar?:  No       Other symptoms causing concern   Do you have hyperglycemia symptoms?:  No   Do you have hypoglycemia symptoms?:  No   Blood Sugar Monitoring Regimen:  Before Meals, At Bedtime   Blood Sugar Trends:  No Change            Care Coordination Interventions    Program Enrollment:  Complex Care  Referral from Primary Care Provider:  Yes  Suggested Interventions and Community Resources  Cardiac Rehab:  Completed (Comment: Participating - [facility])  Diabetes Education:  Completed (Comment: Patient recently completed Diabetes Management Program)  Fall Risk Prevention:  Completed  Home Care Waiver:  Completed  Home Health Services:  Completed (Comment: George Guo add PT.)  Meals on Wheels:  Completed  Physical Therapy:  Completed (Comment: Home Health PT discharged.  Starting outpatient PT.)  Registered Dietician:  Completed  Zone Management Tools:  Completed (Comment: Diabetes Zones)         Goals Addressed                 This Visit's Progress     COMPLETED: Conditions and Symptoms        I will notify my provider of any barriers to my plan of care. I will follow my Zone Management tool to seek urgent or emergent care. I will notify my provider of any symptoms that indicate a worsening of my condition. Barriers: overwhelmed by complexity of regimen  Plan for overcoming my barriers: N/A  Confidence: 7/10  Anticipated Goal Completion Date: 3/30/2019              Prior to Admission medications    Medication Sig Start Date End Date Taking?  Authorizing Provider   FREESTYLE LITE strip TEST BG 4 X DAILY Dx:E11.9, INSULIN DEPENDENT 4/2/19   Tad Diane MD   FREESTYLE LANCETS MISC Test blood sugar 4 times daily (AC and HS) 4/2/19   Tad Diane MD   Blood Glucose Monitoring Suppl (FREESTYLE LITE) CORETTA use as directed 4/2/19   Tad Diane MD   insulin glargine (BASAGLAR KWIKPEN) 100 UNIT/ML injection pen Inject 20 Units into the skin 2 times daily 4/2/19   Tad Diane MD   LYRICA 75 MG capsule TAKE ONE (1) CAPSULE BY MOUTH TWICE DAILY 3/22/19 6/20/19  Tad Diane MD   ARIPiprazole (ABILIFY) 5 MG tablet TAKE 1 TABLET BY MOUTH DAILY 3/22/19   Tad Diane MD   nystatin (MYCOSTATIN) 450749 UNIT/GM powder APPLY TO ABDOMINAL FOLDS EVERY 12 HOURS AS NEEDED 3/13/19   Tad Diane MD   sertraline (ZOLOFT) 50 MG tablet TAKE (1) TABLET BY MOUTH DAILY 3/13/19   Tad Diane MD   Insulin Pen Needle (BD AUTOSHIELD DUO) 30G X 5 MM MISC USE AS DIRECTED 2 TIMES DAILY FOR USE WITH LANTUS SOLOSTAR, 3/12/19   Tad Diane MD   nitrofurantoin (MACRODANTIN) 50 MG capsule TAKE 1 CAPSULE BY MOUTH DAILY 2/12/19   Historical Provider, MD   traZODone (DESYREL) 50 MG tablet TAKE ONE OR TWO TABLETS BY MOUTH EVERY NIGHT AT BEDTIME AS NEEDED FOR INSOMNIA 2/27/19   Tad Diane MD   ASPIRIN LOW DOSE 81 MG EC tablet TAKE (1) TABLET BY MOUTH DAILY 2/27/19   Tad Diane MD   atorvastatin (LIPITOR) 40 MG tablet TAKE 1 TABLET BY MOUTH NIGHTLY 2/27/19   Tad Diane MD   insulin aspart (NOVOLOG FLEXPEN) 100 UNIT/ML injection pen Inject subcut 18 units before breakfast and dinner, 8 units before lunch 2/12/19   Cory Newell MD   Cholecalciferol (VITAMIN D3) 1000 units TABS TAKE (1) TABLET BY MOUTH DAILY 12/14/18   Cory Newell MD   TRADJENTA 5 MG tablet Take 1 tablet by mouth daily 12/14/18   Cory Newell MD   carvedilol (COREG) 6.25 MG tablet TAKE ONE (1) TABLET BY MOUTH TWICE DAILY WITH MEALS 12/12/18   Cory Newell MD   clopidogrel (PLAVIX) 75 MG tablet Take 1 tablet by mouth daily 11/28/18   Jillian Arana MD   isosorbide mononitrate (IMDUR) 30 MG extended release tablet TAKE 1 TABLET BY MOUTH DAILY 11/28/18   Jillian Arana MD   acetaminophen (TYLENOL) 500 MG tablet TAKE 1 TABLET BY MOUTH EVERY 6 HOURS AS NEEDED FOR PAIN OR FEVER 11/14/18   Cory Newell MD   omeprazole (PRILOSEC) 20 MG delayed release capsule TAKE 1 CAPSULE BY MOUTH ONCE DAILY AS NEEDED FOR HEARTBURN 8/31/18   Cory Newell MD   nitroGLYCERIN (NITROSTAT) 0.4 MG SL tablet DISSOLVE 1 TAB UNDER THE TONGUE AS NEEDED FOR CHEST PAIN EVERY 5 MINUTES UP TO 3 TIMES.  IF NO RELIEF CALL 911. 8/31/18   Cory Newell MD   Blood Pressure KIT 1 Device by Does not apply route daily 3/1/18   Cory Newell MD   Alcohol Swabs (EASY TOUCH ALCOHOL PREP MEDIUM) 70 % PADS USE AS DIRECTED THREE TIMES A DAY 7/3/17   Cory Newell MD       Future Appointments   Date Time Provider Aminata Cortes   4/19/2019 11:00 AM Debbie Keys  [facility]   4/22/2019 11:00 AM Debbie Keys  [facility]   4/24/2019 11:00 AM Debbie Keys  [facility]   4/26/2019 11:00 AM Debbie Keys  [facility]   4/29/2019 11:00 AM Debbie Keys  [facility]   5/1/2019 11:00 AM Debbie Keys  [facility]   5/3/2019 11:00 AM Debbie Keys  [facility]   5/6/2019 11:00 AM Debbie Keys  [facility]   5/7/2019 11:00 AM Ryan Ortiz

## 2019-04-22 ENCOUNTER — HOSPITAL ENCOUNTER (OUTPATIENT)
Dept: CARDIAC REHAB | Age: 59
Setting detail: THERAPIES SERIES
Discharge: HOME | End: 2019-04-22
Payer: MEDICARE

## 2019-04-22 PROCEDURE — 93798 PHYS/QHP OP CAR RHAB W/ECG: CPT

## 2019-04-26 ENCOUNTER — HOSPITAL ENCOUNTER (OUTPATIENT)
Dept: CARDIAC REHAB | Age: 59
Setting detail: THERAPIES SERIES
Discharge: HOME | End: 2019-04-26
Payer: MEDICARE

## 2019-04-26 PROCEDURE — 93798 PHYS/QHP OP CAR RHAB W/ECG: CPT

## 2019-04-29 ENCOUNTER — HOSPITAL ENCOUNTER (OUTPATIENT)
Dept: CARDIAC REHAB | Age: 59
Setting detail: THERAPIES SERIES
Discharge: HOME | End: 2019-04-29
Payer: MEDICARE

## 2019-04-29 PROCEDURE — 93798 PHYS/QHP OP CAR RHAB W/ECG: CPT

## 2019-05-03 ENCOUNTER — HOSPITAL ENCOUNTER (OUTPATIENT)
Dept: CARDIAC REHAB | Age: 59
Setting detail: THERAPIES SERIES
Discharge: HOME | End: 2019-05-03
Payer: MEDICARE

## 2019-05-03 PROCEDURE — 93798 PHYS/QHP OP CAR RHAB W/ECG: CPT

## 2019-05-06 ENCOUNTER — HOSPITAL ENCOUNTER (OUTPATIENT)
Dept: CARDIAC REHAB | Age: 59
Setting detail: THERAPIES SERIES
Discharge: HOME | End: 2019-05-06
Payer: MEDICARE

## 2019-05-06 PROCEDURE — 93798 PHYS/QHP OP CAR RHAB W/ECG: CPT

## 2019-05-08 RX ORDER — BLOOD-GLUCOSE METER
KIT MISCELLANEOUS
Qty: 300 STRIP | Refills: 5 | Status: SHIPPED | OUTPATIENT
Start: 2019-05-08 | End: 2019-12-27

## 2019-05-09 ENCOUNTER — OFFICE VISIT (OUTPATIENT)
Dept: BEHAVIORAL/MENTAL HEALTH CLINIC | Age: 59
End: 2019-05-09
Payer: MEDICARE

## 2019-05-09 DIAGNOSIS — F20.9 SCHIZOPHRENIA, CHRONIC CONDITION (HCC): ICD-10-CM

## 2019-05-09 DIAGNOSIS — F32.89 OTHER DEPRESSION: Primary | ICD-10-CM

## 2019-05-09 PROCEDURE — 90832 PSYTX W PT 30 MINUTES: CPT | Performed by: PSYCHOLOGIST

## 2019-05-09 ASSESSMENT — PATIENT HEALTH QUESTIONNAIRE - PHQ9
8. MOVING OR SPEAKING SO SLOWLY THAT OTHER PEOPLE COULD HAVE NOTICED. OR THE OPPOSITE, BEING SO FIGETY OR RESTLESS THAT YOU HAVE BEEN MOVING AROUND A LOT MORE THAN USUAL: 1
2. FEELING DOWN, DEPRESSED OR HOPELESS: 1
SUM OF ALL RESPONSES TO PHQ QUESTIONS 1-9: 10
10. IF YOU CHECKED OFF ANY PROBLEMS, HOW DIFFICULT HAVE THESE PROBLEMS MADE IT FOR YOU TO DO YOUR WORK, TAKE CARE OF THINGS AT HOME, OR GET ALONG WITH OTHER PEOPLE: 1
SUM OF ALL RESPONSES TO PHQ9 QUESTIONS 1 & 2: 2
5. POOR APPETITE OR OVEREATING: 3
4. FEELING TIRED OR HAVING LITTLE ENERGY: 0
3. TROUBLE FALLING OR STAYING ASLEEP: 3
SUM OF ALL RESPONSES TO PHQ QUESTIONS 1-9: 10
9. THOUGHTS THAT YOU WOULD BE BETTER OFF DEAD, OR OF HURTING YOURSELF: 0
6. FEELING BAD ABOUT YOURSELF - OR THAT YOU ARE A FAILURE OR HAVE LET YOURSELF OR YOUR FAMILY DOWN: 0
1. LITTLE INTEREST OR PLEASURE IN DOING THINGS: 1
7. TROUBLE CONCENTRATING ON THINGS, SUCH AS READING THE NEWSPAPER OR WATCHING TELEVISION: 1

## 2019-05-09 NOTE — PROGRESS NOTES
Behavioral Health Consultation  Sienna Lisa Psy.D. Psychologist  19  9:57 AM      Time spent with Patient: 30 minutes  This is patient's fifth  [facility] appointment. Reason for Consult:  depression and stress  Referring Provider: Luis Carlos Laura MD      Feedback given to PCP. S:  Pt reports that she is overeating more lately and not exercising. Pt reports very minimal walking at home. Pt reports that things have been ok. Pt is still occasionally \"talking\" to her  sister, despite knowing that she can't hear her. She uses this primarily to \"vent. \" Pt reports that she continues to struggle with ongoing insomnia; she notes that Trazadone does help but she still wakes up throughout night. Pt reported ongoing dispute regarding the laundry, though she notes that her daughter does do her laundry, she has to \"nag\" her a lot. Pt reports that her uncle  yesterday. She reports that this was expected, but it is bringing back memories of her sister. Pt reports having less interest in reading and difficulty focusing; discussed ways of increasing her concentration. Also discussed behavioral activation and rationale to increase exercise and reduce depression.     O:  MSE:    Appearance    alert, cooperative  Appetite abnormal: overeating  Sleep disturbance Yes  Fatigue Yes  Loss of pleasure No  Impulsive behavior No  Speech    spontaneous, normal rate and normal volume  Mood    Depressed  Affect    depressed affect  Thought Content    helplessness  Thought Process    linear, goal directed and coherent  Associations    logical connections  Insight    Fair  Judgment    Intact  Orientation    oriented to person, place, time, and general circumstances  Memory    recent and remote memory intact  Attention/Concentration    intact  Morbid ideation No  Suicide Assessment    no suicidal ideation      History:    Medications:   Current Outpatient Medications   Medication Sig Dispense Refill    FREESTYLE LITE strip TEST BLOOD GLUCOSE FOUR TIMES DAILY 300 strip 5    insulin aspart (NOVOLOG FLEXPEN) 100 UNIT/ML injection pen INJECT 18 UNITS SUBCUTANEOUSLY BEFORE BREAKFAST AND DINNER AND 8 UNITS BEFORE LUNCH 15 mL 3    FREESTYLE LANCETS MISC Test blood sugar 4 times daily (AC and HS) 150 each 5    Blood Glucose Monitoring Suppl (FREESTYLE LITE) CORETTA use as directed 1 Device 5    insulin glargine (BASAGLAR KWIKPEN) 100 UNIT/ML injection pen Inject 20 Units into the skin 2 times daily 6 pen 2    LYRICA 75 MG capsule TAKE ONE (1) CAPSULE BY MOUTH TWICE DAILY 60 capsule 2    ARIPiprazole (ABILIFY) 5 MG tablet TAKE 1 TABLET BY MOUTH DAILY 90 tablet 1    nystatin (MYCOSTATIN) 456846 UNIT/GM powder APPLY TO ABDOMINAL FOLDS EVERY 12 HOURS AS NEEDED 60 g 3    sertraline (ZOLOFT) 50 MG tablet TAKE (1) TABLET BY MOUTH DAILY 30 tablet 5    Insulin Pen Needle (BD AUTOSHIELD DUO) 30G X 5 MM MISC USE AS DIRECTED 2 TIMES DAILY FOR USE WITH LANTUS SOLOSTAR, 100 each 3    nitrofurantoin (MACRODANTIN) 50 MG capsule TAKE 1 CAPSULE BY MOUTH DAILY  3    traZODone (DESYREL) 50 MG tablet TAKE ONE OR TWO TABLETS BY MOUTH EVERY NIGHT AT BEDTIME AS NEEDED FOR INSOMNIA 180 tablet 1    ASPIRIN LOW DOSE 81 MG EC tablet TAKE (1) TABLET BY MOUTH DAILY 90 tablet 1    atorvastatin (LIPITOR) 40 MG tablet TAKE 1 TABLET BY MOUTH NIGHTLY 90 tablet 1    Cholecalciferol (VITAMIN D3) 1000 units TABS TAKE (1) TABLET BY MOUTH DAILY 90 tablet 3    TRADJENTA 5 MG tablet Take 1 tablet by mouth daily 30 tablet 5    carvedilol (COREG) 6.25 MG tablet TAKE ONE (1) TABLET BY MOUTH TWICE DAILY WITH MEALS 180 tablet 1    clopidogrel (PLAVIX) 75 MG tablet Take 1 tablet by mouth daily 90 tablet 2    isosorbide mononitrate (IMDUR) 30 MG extended release tablet TAKE 1 TABLET BY MOUTH DAILY 30 tablet 5    acetaminophen (TYLENOL) 500 MG tablet TAKE 1 TABLET BY MOUTH EVERY 6 HOURS AS NEEDED FOR PAIN OR FEVER 120 tablet 3    omeprazole (PRILOSEC) 20 MG delayed release capsule TAKE 1 CAPSULE BY MOUTH ONCE DAILY AS NEEDED FOR HEARTBURN 90 capsule 1    nitroGLYCERIN (NITROSTAT) 0.4 MG SL tablet DISSOLVE 1 TAB UNDER THE TONGUE AS NEEDED FOR CHEST PAIN EVERY 5 MINUTES UP TO 3 TIMES. IF NO RELIEF CALL 911. 75 tablet 1    Blood Pressure KIT 1 Device by Does not apply route daily 1 kit 0    Alcohol Swabs (EASY TOUCH ALCOHOL PREP MEDIUM) 70 % PADS USE AS DIRECTED THREE TIMES A  each 3     No current facility-administered medications for this visit. A:  Administered the PHQ9 which indicates a self report of moderate symptom distress. Pt would benefit from [facility] services to increase coping skills to provide symptom management/control/relief. PHQ Scores 5/9/2019 4/5/2019 1/8/2019 11/13/2018 10/16/2018 3/22/2018 1/9/2018   PHQ2 Score 2 2 0 2 3 0 0   PHQ9 Score 10 4 6 10 14 0 0     Interpretation of Total Score Depression Severity: 1-4 = Minimal depression, 5-9 = Mild depression, 10-14 = Moderate depression, 15-19 = Moderately severe depression, 20-27 = Severe depression      Diagnosis:  Other Specified Depressive Disorder with anxious distress  Schizophrenia; paranoid type, BY HISTORY    Plan:  Pt interventions:  Trained in strategies for increasing balanced thinking, Discussed and set plan for behavioral activation, Discussed self-care (sleep, nutrition, rewarding activities, social support, exercise), [place]-setting to identify pt's primary goals for [facility] visit / overall health, Supportive techniques, Emphasized self-care as important for managing overall health and Motivational Interviewing to target pt's increasing exercise      Pt Behavioral Change Plan:  Use the chart below to start to plan pleasurable activities and activities to increase exercise.   Follow up in 4 weeks      Please note this report has been partially produced using speech recognition software  And may cause contain errors related to that system including grammar, punctuation and spelling as well as words and phrases that may seem inappropriate. If there are questions or concerns please feel free to contact me to clarify.

## 2019-05-16 ENCOUNTER — OFFICE VISIT (OUTPATIENT)
Dept: FAMILY MEDICINE CLINIC | Age: 59
End: 2019-05-16
Payer: MEDICARE

## 2019-05-16 VITALS
TEMPERATURE: 97.1 F | RESPIRATION RATE: 16 BRPM | OXYGEN SATURATION: 96 % | WEIGHT: 237 LBS | HEIGHT: 67 IN | DIASTOLIC BLOOD PRESSURE: 70 MMHG | HEART RATE: 70 BPM | BODY MASS INDEX: 37.2 KG/M2 | SYSTOLIC BLOOD PRESSURE: 130 MMHG

## 2019-05-16 DIAGNOSIS — R25.2 LEG CRAMPS: ICD-10-CM

## 2019-05-16 DIAGNOSIS — I20.9 ANGINA, CLASS II (HCC): Chronic | ICD-10-CM

## 2019-05-16 DIAGNOSIS — I25.119 CORONARY ARTERY DISEASE INVOLVING NATIVE CORONARY ARTERY OF NATIVE HEART WITH ANGINA PECTORIS (HCC): Primary | Chronic | ICD-10-CM

## 2019-05-16 DIAGNOSIS — G81.94 HEMIPARESIS, LEFT (HCC): ICD-10-CM

## 2019-05-16 DIAGNOSIS — Z79.4 CONTROLLED TYPE 2 DIABETES MELLITUS WITH DIABETIC NEUROPATHY, WITH LONG-TERM CURRENT USE OF INSULIN (HCC): ICD-10-CM

## 2019-05-16 DIAGNOSIS — E11.40 CHRONIC PAINFUL DIABETIC NEUROPATHY (HCC): ICD-10-CM

## 2019-05-16 DIAGNOSIS — I10 ESSENTIAL HYPERTENSION: Chronic | ICD-10-CM

## 2019-05-16 DIAGNOSIS — E11.40 CONTROLLED TYPE 2 DIABETES MELLITUS WITH DIABETIC NEUROPATHY, WITH LONG-TERM CURRENT USE OF INSULIN (HCC): ICD-10-CM

## 2019-05-16 DIAGNOSIS — N18.30 CKD (CHRONIC KIDNEY DISEASE) STAGE 3, GFR 30-59 ML/MIN (HCC): Chronic | ICD-10-CM

## 2019-05-16 DIAGNOSIS — E11.21 DIABETIC NEPHROPATHY WITH PROTEINURIA (HCC): ICD-10-CM

## 2019-05-16 PROBLEM — T43.505A NEUROLEPTIC-INDUCED TARDIVE DYSKINESIA: Chronic | Status: ACTIVE | Noted: 2019-05-16

## 2019-05-16 PROBLEM — G24.01 NEUROLEPTIC-INDUCED TARDIVE DYSKINESIA: Chronic | Status: ACTIVE | Noted: 2019-05-16

## 2019-05-16 PROBLEM — Z96.652 STATUS POST TOTAL LEFT KNEE REPLACEMENT: Status: RESOLVED | Noted: 2018-06-21 | Resolved: 2019-05-16

## 2019-05-16 LAB
ANION GAP SERPL CALCULATED.3IONS-SCNC: 14 MEQ/L (ref 9–15)
BUN BLDV-MCNC: 35 MG/DL (ref 8–23)
CALCIUM SERPL-MCNC: 9.6 MG/DL (ref 8.5–9.9)
CHLORIDE BLD-SCNC: 104 MEQ/L (ref 95–107)
CO2: 20 MEQ/L (ref 20–31)
CREAT SERPL-MCNC: 1.95 MG/DL (ref 0.5–0.9)
GFR AFRICAN AMERICAN: 31.5
GFR NON-AFRICAN AMERICAN: 26.1
GLUCOSE BLD-MCNC: 195 MG/DL (ref 70–99)
HBA1C MFR BLD: 6.4 % (ref 4.8–5.9)
MAGNESIUM: 1.9 MG/DL (ref 1.7–2.4)
POTASSIUM SERPL-SCNC: 4.8 MEQ/L (ref 3.4–4.9)
SODIUM BLD-SCNC: 138 MEQ/L (ref 135–144)

## 2019-05-16 PROCEDURE — G8598 ASA/ANTIPLAT THER USED: HCPCS | Performed by: FAMILY MEDICINE

## 2019-05-16 PROCEDURE — 3017F COLORECTAL CA SCREEN DOC REV: CPT | Performed by: FAMILY MEDICINE

## 2019-05-16 PROCEDURE — 2022F DILAT RTA XM EVC RTNOPTHY: CPT | Performed by: FAMILY MEDICINE

## 2019-05-16 PROCEDURE — 1036F TOBACCO NON-USER: CPT | Performed by: FAMILY MEDICINE

## 2019-05-16 PROCEDURE — G8427 DOCREV CUR MEDS BY ELIG CLIN: HCPCS | Performed by: FAMILY MEDICINE

## 2019-05-16 PROCEDURE — 3044F HG A1C LEVEL LT 7.0%: CPT | Performed by: FAMILY MEDICINE

## 2019-05-16 PROCEDURE — G8417 CALC BMI ABV UP PARAM F/U: HCPCS | Performed by: FAMILY MEDICINE

## 2019-05-16 PROCEDURE — 99214 OFFICE O/P EST MOD 30 MIN: CPT | Performed by: FAMILY MEDICINE

## 2019-05-16 NOTE — PROGRESS NOTES
Subjective  Poli Brizuela, 62 y.o. female presents today with:  Chief Complaint   Patient presents with   [address]     Former Dr Zoey Lee pt    Diabetes     does not see Dr Elena Julio.  this morning. pt states she sees Dr Adilene Francis for podiatry.  Hypertension     denies headache and chest pain. States dizziness x 3 days hx vertigo.  Hyperlipidemia           HPI    This is a new patient to me. I have reviewed the past medical and surgical history, social history and family history provided. I have reviewed  medication, previous testing and working diagnoses. I have reviewed the allergies and health maintenance information and correlated it into my decision making for this patient for care, diagnostics, consultations and treatment for today's visit. CAD s/p PA mid LAD. Also prior stents x 2. On Plavix. No chest pain. Has chronic shortness of breath. No tobacco use. Followed by Dr. Landen Moore. Patient is here for DM f/u. Sugars have been moderately well-controlled and patient has not been experiencing hyper- or hypoglycemic symptoms. Compliance with meds is good and there are no side effects. Patient is not eat low carb diet and does not exercise regularly. Is up to date on foot and eye exams and immunizations. Thinks sugar is going up because of recently decreased dose of insulin. Has chronic moderate to severe neuropathic pain bilateral feet for which she takes Lyrica. [address] services for ADLs: cognitive impairment; needs assist with bathing because she cannot stand for a long time and she is unable to reach her back; has h/o CVA and has left arm and leg hemiparesis balance is abnormal so uses walker; mild assist with toileting; needs assist with food prep because is unable to stand for prolonged periods. Needs med organizer but can admin insulin herself. Cramps in legs during mornings for about one week.     No other questions and or concerns for today's visit      Review of Systems  No fevers chills sweats nausea vomiting rashes or swollen glands    Past Medical History:   Diagnosis Date    Anxiety     CAD S/P percutaneous coronary angioplasty ,     stent per dr Roly Duran Diabetic nephropathy with proteinuria ([facility]     DJD (degenerative joint disease) of knee     Dr Steven Delgadillo GERD (gastroesophageal reflux disease)     Hemiparesis, left ([facility] 2013    entered Ass[facility] Living ([facility]    History of seizures     History of type C viral hepatitis     HTN (hypertension)     Hyperlipidemia     Impaired mobility and activities of daily living     Mediastinal lymphadenopathy     Dr Talat Ba, [facility]    Metabolic syndrome     Neurogenic urinary incontinence 2013    Neuropathy in diabetes ([facility]     Obesity (BMI 30-39. 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 Joie Marcano      x1 Dr. Umm Knight, Dr Glenna Moss 2018    HYSTERECTOMY, TOTAL ABDOMINAL      one ovary intact, Dr Kay Vela, menorrhagia    [facility] Left 2018    LEFT KNEE TOTAL KNEE ARTHROPLASTY, SHAYNA, NERVE BLOCK performed by Arun Menendez MD at [address] [place] [address] Right     TOTAL KNEE ARTHROPLASTY  16    Dr Gray Im History     Socioeconomic History    Marital status:      Spouse name: Not on file    Number of children: 2    Years of education: Not on file    Highest education level: Not on file   Occupational History    Occupation: disabled   Social Needs    Financial resource strain: Not on file    Food insecurity:     Worry: Not on file     Inability: Not on file   Topicmarks needs:     Medical: Not on file     Non-medical: Not on file   Tobacco Use    Smoking status: Passive Smoke Exposure - Never Smoker    Smokeless tobacco: Never Used   Substance and Sexual Activity    Alcohol use: No     Alcohol/week: 0.0 oz    Drug use: No    Sexual activity: Not Currently   Lifestyle    Physical activity:     Days per week: Not on file     Minutes per session: Not on file    Stress: Not on file   Relationships    Social connections:     Talks on phone: Not on file     Gets together: Not on file     Attends Yarsani service: Not on file     Active member of club or organization: Not on file     Attends meetings of clubs or organizations: Not on file     Relationship status: Not on file    Intimate partner violence:     Fear of current or ex partner: Not on file     Emotionally abused: Not on file     Physically abused: Not on file     Forced sexual activity: Not on file   Other Topics Concern    Not on file   Social History Narrative    Born in [place], one of 5    Twin sister Reyes, very ill in 2018, [place] [id number]    Moved to [place], , 2 children, one son and one daughter    Worked at [employer], as a nurse's aide    Disabled due to mental illness    Lived at [facility], was discharged, returned to independent living in 2017 in the daughter's house and has adjusted well    One son and one daughter, live in the same house with patient, Cathy Diggs pays the rent    Hobbies reading (misteries)     Family History   Problem Relation Age of Onset    Cancer Mother 76        survived   [facility] Hypertension Father     Diabetes Sister     Mental Illness Sister      Allergies Allergen Reactions    Codeine Hives     hives     Current Outpatient Medications   Medication Sig Dispense Refill    Magnesium 400 MG CAPS Take 1 capsule by mouth daily 30 capsule 11    FREESTYLE LITE strip TEST BLOOD GLUCOSE FOUR TIMES DAILY 300 strip 5    insulin aspart (NOVOLOG FLEXPEN) 100 UNIT/ML injection pen INJECT 18 UNITS SUBCUTANEOUSLY BEFORE BREAKFAST AND DINNER AND 8 UNITS BEFORE LUNCH 15 mL 3    FREESTYLE LANCETS MISC Test blood sugar 4 times daily (AC and HS) 150 each 5    Blood Glucose Monitoring Suppl (FREESTYLE LITE) CORETTA use as directed 1 Device 5    insulin glargine (BASAGLAR KWIKPEN) 100 UNIT/ML injection pen Inject 20 Units into the skin 2 times daily 6 pen 2    LYRICA 75 MG capsule TAKE ONE (1) CAPSULE BY MOUTH TWICE DAILY 60 capsule 2    ARIPiprazole (ABILIFY) 5 MG tablet TAKE 1 TABLET BY MOUTH DAILY 90 tablet 1    nystatin (MYCOSTATIN) 284017 UNIT/GM powder APPLY TO ABDOMINAL FOLDS EVERY 12 HOURS AS NEEDED 60 g 3    sertraline (ZOLOFT) 50 MG tablet TAKE (1) TABLET BY MOUTH DAILY 30 tablet 5    Insulin Pen Needle (BD AUTOSHIELD DUO) 30G X 5 MM MISC USE AS DIRECTED 2 TIMES DAILY FOR USE WITH LANTUS SOLOSTAR, 100 each 3    nitrofurantoin (MACRODANTIN) 50 MG capsule TAKE 1 CAPSULE BY MOUTH DAILY  3    traZODone (DESYREL) 50 MG tablet TAKE ONE OR TWO TABLETS BY MOUTH EVERY NIGHT AT BEDTIME AS NEEDED FOR INSOMNIA 180 tablet 1    ASPIRIN LOW DOSE 81 MG EC tablet TAKE (1) TABLET BY MOUTH DAILY 90 tablet 1    atorvastatin (LIPITOR) 40 MG tablet TAKE 1 TABLET BY MOUTH NIGHTLY 90 tablet 1    Cholecalciferol (VITAMIN D3) 1000 units TABS TAKE (1) TABLET BY MOUTH DAILY 90 tablet 3    TRADJENTA 5 MG tablet Take 1 tablet by mouth daily 30 tablet 5    carvedilol (COREG) 6.25 MG tablet TAKE ONE (1) TABLET BY MOUTH TWICE DAILY WITH MEALS 180 tablet 1    clopidogrel (PLAVIX) 75 MG tablet Take 1 tablet by mouth daily 90 tablet 2    isosorbide mononitrate (IMDUR) 30 MG extended release CHOL 137 02/26/2019    TRIG 116 02/26/2019    HDL 55 02/26/2019    LDLCALC 59 02/26/2019        Assessment & Plan   Visit Diagnoses and Associated Orders     Coronary artery disease involving native coronary artery of native heart with angina pectoris ([facility]    -  Primary    Improving, good control with atorvastatin, carvedilol, clopidigrel and optimal glucose control         Essential hypertension        Stable and well-controlled on current meds. Chronic painful diabetic neuropathy (HCC)        Stable, fair control on current meds. Diabetic nephropathy with proteinuria ([facility])        Stable, fair control on current meds. Follow labs. Hemiparesis, left ([facility])        Stable, fair control. Nonprogressive. Receives home health care for debility. CKD (chronic kidney disease) stage 3, GFR 30-59 ml/min ([facility])        Stable, fair control on current meds. Follow labs. Basic Metabolic Panel [[id number] Custom]   - Future Order         Controlled type 2 diabetes mellitus with diabetic neuropathy, with long-term current use of insulin ([facility])        Stable, well controlled on current meds. Follow labs. Hemoglobin A1C [[id number] Custom]   - Future Order         Angina, class II ([facility])        Stable, fair control on him to her. Also on statin and Plavix as well as carvedilol. Followed by cardiology. Leg cramps        Check labs. Try magnesium 400 mg by mouth at night.     Basic Metabolic Panel [[id number] Custom]   - Future Order    Magnesium [[id number] Custom]   - Future Order    Magnesium 400 MG CAPS [[id number]]                   Reviewed with the patient: all disease processes, current clinical status, medications, activities and diet.      Side effects, adverse effects of the medication prescribed today, as well as treatment plan/ rationale and result expectations have been discussed with the patient who expresses understanding and desires to proceed.     Close follow up to evaluate treatment results and for coordination of care. I have reviewed the patient's medical history in detail and updated the computerized patient record. More than 50% of the appointment was spent in face-to-face counseling, education and care coordination. Orders Placed This Encounter   Procedures    Hemoglobin A1C     Standing Status:   Future     Number of Occurrences:   1     Standing Expiration Date:   7/16/2019    Basic Metabolic Panel     Standing Status:   Future     Number of Occurrences:   1     Standing Expiration Date:   5/16/2020    Magnesium     Standing Status:   Future     Number of Occurrences:   1     Standing Expiration Date:   5/16/2020     Orders Placed This Encounter   Medications    Magnesium 400 MG CAPS     Sig: Take 1 capsule by mouth daily     Dispense:  30 capsule     Refill:  11     There are no discontinued medications. No follow-ups on file. Quality & Risk Score Accuracy    Last edited 05/16/19 10:20 EDT by Melba Armendariz MD           Controlled Substances Monitoring:     RX Monitoring 3/22/2019   Attestation The Prescription Monitoring Report for this patient was reviewed today. Chronic Pain Routine Monitoring Obtaining appropriate analgesic effect of treatment. ;No signs of potential drug abuse or diversion identified: otherwise, see note documentation       Melba Armendariz MD

## 2019-05-20 ENCOUNTER — HOSPITAL ENCOUNTER (OUTPATIENT)
Dept: CARDIAC REHAB | Age: 59
Setting detail: THERAPIES SERIES
Discharge: HOME | End: 2019-05-20
Payer: MEDICARE

## 2019-05-20 PROCEDURE — 93798 PHYS/QHP OP CAR RHAB W/ECG: CPT

## 2019-05-21 DIAGNOSIS — E11.22 CKD STAGE 4 DUE TO TYPE 2 DIABETES MELLITUS (HCC): Primary | ICD-10-CM

## 2019-05-21 DIAGNOSIS — N18.4 CKD STAGE 4 DUE TO TYPE 2 DIABETES MELLITUS (HCC): Primary | ICD-10-CM

## 2019-05-29 DIAGNOSIS — I10 ESSENTIAL HYPERTENSION: Chronic | ICD-10-CM

## 2019-05-30 RX ORDER — CARVEDILOL 6.25 MG/1
TABLET ORAL
Qty: 180 TABLET | Refills: 11 | Status: SHIPPED | OUTPATIENT
Start: 2019-05-30 | End: 2019-12-27

## 2019-06-05 ENCOUNTER — TELEPHONE (OUTPATIENT)
Dept: FAMILY MEDICINE CLINIC | Age: 59
End: 2019-06-05

## 2019-06-05 ENCOUNTER — OFFICE VISIT (OUTPATIENT)
Dept: CARDIOLOGY CLINIC | Age: 59
End: 2019-06-05
Payer: MEDICARE

## 2019-06-05 VITALS
BODY MASS INDEX: 36.57 KG/M2 | HEIGHT: 67 IN | SYSTOLIC BLOOD PRESSURE: 114 MMHG | HEART RATE: 75 BPM | OXYGEN SATURATION: 96 % | RESPIRATION RATE: 16 BRPM | WEIGHT: 233 LBS | DIASTOLIC BLOOD PRESSURE: 62 MMHG

## 2019-06-05 DIAGNOSIS — I20.9 ANGINA, CLASS II (HCC): Chronic | ICD-10-CM

## 2019-06-05 DIAGNOSIS — I10 ESSENTIAL HYPERTENSION: Chronic | ICD-10-CM

## 2019-06-05 DIAGNOSIS — I25.119 CORONARY ARTERY DISEASE INVOLVING NATIVE CORONARY ARTERY OF NATIVE HEART WITH ANGINA PECTORIS (HCC): Primary | Chronic | ICD-10-CM

## 2019-06-05 DIAGNOSIS — E78.2 MIXED HYPERLIPIDEMIA: Chronic | ICD-10-CM

## 2019-06-05 DIAGNOSIS — Z95.5 STENTED CORONARY ARTERY: ICD-10-CM

## 2019-06-05 DIAGNOSIS — I20.9 ANGINA, CLASS III (HCC): ICD-10-CM

## 2019-06-05 PROCEDURE — G8428 CUR MEDS NOT DOCUMENT: HCPCS | Performed by: INTERNAL MEDICINE

## 2019-06-05 PROCEDURE — G8598 ASA/ANTIPLAT THER USED: HCPCS | Performed by: INTERNAL MEDICINE

## 2019-06-05 PROCEDURE — 99213 OFFICE O/P EST LOW 20 MIN: CPT | Performed by: INTERNAL MEDICINE

## 2019-06-05 PROCEDURE — G8417 CALC BMI ABV UP PARAM F/U: HCPCS | Performed by: INTERNAL MEDICINE

## 2019-06-05 PROCEDURE — 3017F COLORECTAL CA SCREEN DOC REV: CPT | Performed by: INTERNAL MEDICINE

## 2019-06-05 PROCEDURE — 1036F TOBACCO NON-USER: CPT | Performed by: INTERNAL MEDICINE

## 2019-06-05 RX ORDER — ISOSORBIDE MONONITRATE 60 MG/1
60 TABLET, EXTENDED RELEASE ORAL DAILY
Qty: 30 TABLET | Refills: 3 | Status: SHIPPED | OUTPATIENT
Start: 2019-06-05 | End: 2019-08-27 | Stop reason: SDUPTHER

## 2019-06-05 NOTE — PROGRESS NOTES
[facility] CARDIOLOGY OFFICE FOLLOW-UP      Patient: Liam Dow  YOB: 1960  MRN: [id number]    Chief Complaint:  Chief Complaint   Patient presents with    Coronary Artery Disease     10 m f/u    Hypertension    Shortness of Breath       Subjective/HPI    The patient is followed in the cardiology office chronically for the following problems: CAD, PCI LAD, HTN, HPL, CKD    The last encounter focused on the following: followup    Testing/hospitalizations/procedures performed since last encounter: PCI mid LAD    Since the last encounter, the patient has not had new symptoms/events. Occasional angina, has used nitro only a couple of times. Short of breath unchanged after intervention. Past Medical History:   Diagnosis Date    Anxiety     CAD S/P percutaneous coronary angioplasty 2015, 2018    stent per dr Dar Tomas    Diabetic nephropathy with proteinuria ([facility] [address] 2014    DJD (degenerative joint disease) of knee     Dr Aga Donaldson GERD (gastroesophageal reflux disease)     Hemiparesis, left ([address] 2013    entered Assisted Living ([facility])    History of seizures     History of type C viral hepatitis     HTN (hypertension)     Hyperlipidemia     Impaired mobility and activities of daily living     Mediastinal lymphadenopathy 2013    [facility]    Metabolic syndrome     Neurogenic urinary incontinence 2013    Neuropathy in diabetes ([address]     Obesity (BMI 30-39. 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 Maia Reyes      x1 Dr. Alana Grande, Dr Harini Wong 2018    HYSTERECTOMY, TOTAL ABDOMINAL      one ovary intact, Dr Clari Rey, menorrhagia    [place] TOTAL KNEE ARTHROPLASTY Left 2018    LEFT KNEE TOTAL KNEE ARTHROPLASTY, SHAYNA, NERVE BLOCK performed by Isaiah Hankins MD at [address] [place] [address] Right     TOTAL KNEE ARTHROPLASTY  16    Dr Remy Escalera       Family History   Problem Relation Age of Onset    Cancer Mother 76        survived   [de-identified] Hypertension Father     Diabetes Sister     Mental Illness Sister        Social History     Socioeconomic History    Marital status:      Spouse name: None    Number of children: 2    Years of education: None    Highest education level: None   Occupational History    Occupation: disabled   Social Needs    Financial resource strain: None    Food insecurity:     Worry: None     Inability: None    Transportation needs:     Medical: None     Non-medical: None   Tobacco Use    Smoking status: Passive Smoke Exposure - Never Smoker    Smokeless tobacco: Never Used   Substance and Sexual Activity    Alcohol use: No     Alcohol/week: 0.0 oz    Drug use: No    Sexual activity: Not Currently   Lifestyle    Physical activity:     Days per week: None     Minutes per session: None    Stress: None   Relationships    Social connections:     Talks on phone: None     Gets together: None     Attends [employer] service: None     Active member of club or organization: None     Attends meetings of clubs or organizations: None     Relationship status: None    Intimate partner violence:     Fear of current or ex partner: None     Emotionally abused: None     Physically abused: None     Forced sexual activity: None   Other Topics Concern    None   Social History CAPSULE BY MOUTH DAILY  3    traZODone (DESYREL) 50 MG tablet TAKE ONE OR TWO TABLETS BY MOUTH EVERY NIGHT AT BEDTIME AS NEEDED FOR INSOMNIA 180 tablet 1    ASPIRIN LOW DOSE 81 MG EC tablet TAKE (1) TABLET BY MOUTH DAILY 90 tablet 1    atorvastatin (LIPITOR) 40 MG tablet TAKE 1 TABLET BY MOUTH NIGHTLY 90 tablet 1    Cholecalciferol (VITAMIN D3) 1000 units TABS TAKE (1) TABLET BY MOUTH DAILY 90 tablet 3    TRADJENTA 5 MG tablet Take 1 tablet by mouth daily 30 tablet 5    clopidogrel (PLAVIX) 75 MG tablet Take 1 tablet by mouth daily 90 tablet 2    acetaminophen (TYLENOL) 500 MG tablet TAKE 1 TABLET BY MOUTH EVERY 6 HOURS AS NEEDED FOR PAIN OR FEVER 120 tablet 3    omeprazole (PRILOSEC) 20 MG delayed release capsule TAKE 1 CAPSULE BY MOUTH ONCE DAILY AS NEEDED FOR HEARTBURN 90 capsule 1    Blood Pressure KIT 1 Device by Does not apply route daily 1 kit 0    Alcohol Swabs (EASY TOUCH ALCOHOL PREP MEDIUM) 70 % PADS USE AS DIRECTED THREE TIMES A  each 3    nitroGLYCERIN (NITROSTAT) 0.4 MG SL tablet DISSOLVE 1 TAB UNDER THE TONGUE AS NEEDED FOR CHEST PAIN EVERY 5 MINUTES UP TO 3 TIMES. IF NO RELIEF CALL 911. 75 tablet 1     No current facility-administered medications for this visit. Review of Systems:   Review of Systems   Constitutional: Negative for activity change and appetite change. HENT: Negative for congestion. Respiratory: Negative for apnea, choking and chest tightness. Cardiovascular: Negative for chest pain. Gastrointestinal: Negative for abdominal distention and abdominal pain. Endocrine: Negative for cold intolerance and heat intolerance. Genitourinary: Negative for dysuria and enuresis. Musculoskeletal: Negative for arthralgias and back pain. Skin: Negative for color change. Allergic/Immunologic: Negative. Neurological: Negative for dizziness, seizures, syncope and light-headedness.    Psychiatric/Behavioral: Negative for agitation, behavioral problems and confusion. Physical Examination:    /62 (Site: Left Upper Arm, Position: Sitting, Cuff Size: Large Adult)   Pulse 75   Resp 16   Ht 5' 7\" (1.702 m)   Wt 233 lb (105.7 kg)   LMP  (LMP Unknown)   SpO2 96%   BMI 36.49 kg/m²    Physical Exam   Constitutional: The patient appears healthy. No distress. HENT: Mouth/Throat: Oropharynx is clear. Eyes: Pupils are equal, round, and reactive to light. Neck: Normal range of motion. No JVD present. Cardiovascular: Regular rhythm, S1 normal, S2 normal, normal heart sounds and intact distal pulses. Exam reveals no gallop. No murmur heard. Pulses:       Radial pulses are 2+ on the right side. Dorsalis pedis pulses are 2+ on the right side. Pulmonary/Chest: Effort normal and breath sounds normal. No wheezes. No rales. No tenderness. Abdominal: Soft. Bowel sounds are normal.   Musculoskeletal: Normal range of motion. No edema. Neurological: The patient is alert and oriented to person, place, and time. Intact cranial nerves. Skin: Skin is warm and dry. No rash noted.        LABS:  CBC:   Lab Results   Component Value Date    WBC 11.0 02/26/2019    RBC 3.88 02/26/2019    HGB 10.6 02/26/2019    HCT 30.7 02/26/2019    MCV 79.2 02/26/2019    MCH 27.2 02/26/2019    MCHC 34.4 02/26/2019    RDW 15.1 02/26/2019     02/26/2019    MPV 9.3 08/07/2015     Lipids:  Lab Results   Component Value Date    CHOL 137 02/26/2019    CHOL 104 06/28/2018    CHOL 118 02/24/2018     Lab Results   Component Value Date    TRIG 116 02/26/2019    TRIG 85 06/28/2018    TRIG 141 02/24/2018     Lab Results   Component Value Date    HDL 55 02/26/2019    HDL 42 06/28/2018    HDL 34 (L) 02/24/2018     Lab Results   Component Value Date    LDLCALC 59 02/26/2019    LDLCALC 45 06/28/2018    LDLCALC 56 02/24/2018     Lab Results   Component Value Date    LABVLDL 59.0 05/04/2013    VLDL 43 01/30/2017     Lab Results   Component Value Date    CHOLHDLRATIO 4.32 01/30/2017 CMP:    Lab Results   Component Value Date     05/16/2019    K 4.8 05/16/2019    K 4.9 06/22/2018     05/16/2019    CO2 20 05/16/2019    BUN 35 05/16/2019    CREATININE 1.95 05/16/2019    GFRAA 31.5 05/16/2019    LABGLOM 26.1 05/16/2019    GLUCOSE 195 05/16/2019    PROT 6.8 02/26/2019    LABALBU 3.2 02/26/2019    CALCIUM 9.6 05/16/2019    BILITOT 0.3 02/26/2019    ALKPHOS 100 02/26/2019    AST 15 02/26/2019    ALT 12 02/26/2019     BMP:    Lab Results   Component Value Date     05/16/2019    K 4.8 05/16/2019    K 4.9 06/22/2018     05/16/2019    CO2 20 05/16/2019    BUN 35 05/16/2019    LABALBU 3.2 02/26/2019    CREATININE 1.95 05/16/2019    CALCIUM 9.6 05/16/2019    GFRAA 31.5 05/16/2019    LABGLOM 26.1 05/16/2019    GLUCOSE 195 05/16/2019     Magnesium:    Lab Results   Component Value Date    MG 1.9 05/16/2019     TSH:  Lab Results   Component Value Date    TSH 0.574 10/08/2017       Patient Active Problem List   Diagnosis    Coronary artery disease involving native heart with angina pectoris (HCC)    Schizophrenia, paranoid, chronic    Metabolic syndrome    Obesity    Vitamin B 12 deficiency    Cerebral microvascular disease    Mixed hyperlipidemia    Other hammer toe (acquired)    Vitamin D insufficiency    Incontinence of urine    Diabetic nephropathy with proteinuria ([address]    HTN (hypertension)    History of type C viral hepatitis    Urinary incontinence due to cognitive impairment    History of seizures    Stented coronary artery-plan is to stay on Plavix indefinately per Dr Petra Anand Controlled type 2 diabetes mellitus with diabetic neuropathy, with long-term current use of insulin (HCC)    Hemiparesis, left (HCC)    Angina, class II ([address]    CKD (chronic kidney disease) stage 3, GFR 30-59 ml/min (HCC)    Chronic painful diabetic neuropathy (HCC)    Tardive dyskinesia       Medications:  Current Outpatient Medications   Medication Sig Dispense Refill    isosorbide mononitrate (IMDUR) 60 MG extended release tablet Take 1 tablet by mouth daily 30 tablet 3    carvedilol (COREG) 6.25 MG tablet TAKE 1 TABLET BY MOUTH TWICE DAILY WITH MEALS 180 tablet 11    Magnesium 400 MG CAPS Take 1 capsule by mouth daily 30 capsule 11    FREESTYLE LITE strip TEST BLOOD GLUCOSE FOUR TIMES DAILY 300 strip 5    insulin aspart (NOVOLOG FLEXPEN) 100 UNIT/ML injection pen INJECT 18 UNITS SUBCUTANEOUSLY BEFORE BREAKFAST AND DINNER AND 8 UNITS BEFORE LUNCH 15 mL 3    FREESTYLE LANCETS MISC Test blood sugar 4 times daily (AC and HS) 150 each 5    Blood Glucose Monitoring Suppl (FREESTYLE LITE) CORETTA use as directed 1 Device 5    insulin glargine (BASAGLAR KWIKPEN) 100 UNIT/ML injection pen Inject 20 Units into the skin 2 times daily 6 pen 2    LYRICA 75 MG capsule TAKE ONE (1) CAPSULE BY MOUTH TWICE DAILY 60 capsule 2    ARIPiprazole (ABILIFY) 5 MG tablet TAKE 1 TABLET BY MOUTH DAILY 90 tablet 1    nystatin (MYCOSTATIN) 500523 UNIT/GM powder APPLY TO ABDOMINAL FOLDS EVERY 12 HOURS AS NEEDED 60 g 3    sertraline (ZOLOFT) 50 MG tablet TAKE (1) TABLET BY MOUTH DAILY 30 tablet 5    Insulin Pen Needle (BD AUTOSHIELD DUO) 30G X 5 MM MISC USE AS DIRECTED 2 TIMES DAILY FOR USE WITH LANTUS SOLOSTAR, 100 each 3    nitrofurantoin (MACRODANTIN) 50 MG capsule TAKE 1 CAPSULE BY MOUTH DAILY  3    traZODone (DESYREL) 50 MG tablet TAKE ONE OR TWO TABLETS BY MOUTH EVERY NIGHT AT BEDTIME AS NEEDED FOR INSOMNIA 180 tablet 1    ASPIRIN LOW DOSE 81 MG EC tablet TAKE (1) TABLET BY MOUTH DAILY 90 tablet 1    atorvastatin (LIPITOR) 40 MG tablet TAKE 1 TABLET BY MOUTH NIGHTLY 90 tablet 1    Cholecalciferol (VITAMIN D3) 1000 units TABS TAKE (1) TABLET BY MOUTH DAILY 90 tablet 3    TRADJENTA 5 MG tablet Take 1 tablet by mouth daily 30 tablet 5    clopidogrel (PLAVIX) 75 MG tablet Take 1 tablet by mouth daily 90 tablet 2    acetaminophen (TYLENOL) 500 MG tablet TAKE 1 TABLET BY MOUTH EVERY 6 HOURS AS NEEDED FOR PAIN OR FEVER 120 tablet 3    omeprazole (PRILOSEC) 20 MG delayed release capsule TAKE 1 CAPSULE BY MOUTH ONCE DAILY AS NEEDED FOR HEARTBURN 90 capsule 1    Blood Pressure KIT 1 Device by Does not apply route daily 1 kit 0    Alcohol Swabs (EASY TOUCH ALCOHOL PREP MEDIUM) 70 % PADS USE AS DIRECTED THREE TIMES A  each 3    nitroGLYCERIN (NITROSTAT) 0.4 MG SL tablet DISSOLVE 1 TAB UNDER THE TONGUE AS NEEDED FOR CHEST PAIN EVERY 5 MINUTES UP TO 3 TIMES. IF NO RELIEF CALL 911. 75 tablet 1     No current facility-administered medications for this visit. Assessment/Plan:    1. Coronary artery disease involving native coronary artery of native heart with angina pectoris (HCC)  DAPT, statin, b-blocker and RF modification  Add imdur for angina  - isosorbide mononitrate (IMDUR) 60 MG extended release tablet; Take 1 tablet by mouth daily  Dispense: 30 tablet; Refill: 3    2. Mixed hyperlipidemia  The patient has hyperlipidemia without statin intolerance. The patient will be continued on high intensity statin. The labs are managed by PCP. As per recent guidelines, moderate dose high intensity statin is indicated. 3. Essential hypertension  Patient has essential hypertension on BP medications. The guideline-directed target for BP in this patient is <130/80. In order to reach our target BP we will continue current BP medications, increasing the dose of current meds or adding new to reach target. 4. Angina, class II (HCC)    - isosorbide mononitrate (IMDUR) 60 MG extended release tablet; Take 1 tablet by mouth daily  Dispense: 30 tablet; Refill: 3       Counseling: the patient was counseled regarding diet, exercise, weight control and heart healthy lifestyle. Return in about 3 months (around 9/5/2019) for followup cv disease, followup new med. Electronically signed by   Amandeep Randhawa.  Matt Muñoz MD [facility] Director of Cardiology Services and Cardiac Catheterization Laboratory  Time Sharri Iraheta    on 6/5/2019 at 10:07 AM

## 2019-06-10 RX ORDER — INSULIN GLARGINE 100 [IU]/ML
INJECTION, SOLUTION SUBCUTANEOUS
Qty: 15 ML | Refills: 10 | Status: ON HOLD | OUTPATIENT
Start: 2019-06-10 | End: 2019-10-08 | Stop reason: SDUPTHER

## 2019-06-11 ENCOUNTER — TELEPHONE (OUTPATIENT)
Dept: FAMILY MEDICINE CLINIC | Age: 59
End: 2019-06-11

## 2019-06-11 NOTE — TELEPHONE ENCOUNTER
Patient had recent labs done, ordered by Dr. Shekhar Musa. She wanted to let you know so that you can also review them.

## 2019-06-20 DIAGNOSIS — E11.40 CHRONIC PAINFUL DIABETIC NEUROPATHY (HCC): ICD-10-CM

## 2019-06-28 RX ORDER — ARIPIPRAZOLE 5 MG/1
TABLET ORAL
Qty: 90 TABLET | Refills: 1 | Status: SHIPPED | OUTPATIENT
Start: 2019-06-28 | End: 2019-12-27

## 2019-06-28 NOTE — TELEPHONE ENCOUNTER
Patient called to advise that the pharmacy never got the refill on 6/21/2019    Rx request   Requested Prescriptions     Pending Prescriptions Disp Refills    ARIPiprazole (ABILIFY) 5 MG tablet 90 tablet 1     Sig: TAKE 1 TABLET BY MOUTH DAILY     LOV 5/16/2019  Next Visit Date:  Future Appointments   Date Time Provider Aminata Cortes   7/2/2019  9:30 AM Shaniqua Harper MD [address]   7/25/2019 10:00 AM Beatriz Dempsey MD [facility]   8/13/2019  3:00 PM Boni Reveles MD [facility]   9/11/2019 10:15 AM Mason Dozier MD [facility]

## 2019-07-02 ENCOUNTER — OFFICE VISIT (OUTPATIENT)
Dept: INTERNAL MEDICINE CLINIC | Age: 59
End: 2019-07-02
Payer: MEDICARE

## 2019-07-02 ENCOUNTER — TELEPHONE (OUTPATIENT)
Dept: INTERNAL MEDICINE CLINIC | Age: 59
End: 2019-07-02

## 2019-07-02 VITALS
RESPIRATION RATE: 18 BRPM | TEMPERATURE: 98.4 F | DIASTOLIC BLOOD PRESSURE: 65 MMHG | WEIGHT: 233.6 LBS | OXYGEN SATURATION: 94 % | BODY MASS INDEX: 36.66 KG/M2 | HEIGHT: 67 IN | SYSTOLIC BLOOD PRESSURE: 113 MMHG | HEART RATE: 60 BPM

## 2019-07-02 DIAGNOSIS — R53.83 FATIGUE, UNSPECIFIED TYPE: ICD-10-CM

## 2019-07-02 DIAGNOSIS — E11.40 CONTROLLED TYPE 2 DIABETES MELLITUS WITH DIABETIC NEUROPATHY, WITH LONG-TERM CURRENT USE OF INSULIN (HCC): Primary | ICD-10-CM

## 2019-07-02 DIAGNOSIS — Z79.4 CONTROLLED TYPE 2 DIABETES MELLITUS WITH DIABETIC NEUROPATHY, WITH LONG-TERM CURRENT USE OF INSULIN (HCC): Primary | ICD-10-CM

## 2019-07-02 DIAGNOSIS — E11.40 CHRONIC PAINFUL DIABETIC NEUROPATHY (HCC): ICD-10-CM

## 2019-07-02 PROCEDURE — G8427 DOCREV CUR MEDS BY ELIG CLIN: HCPCS | Performed by: INTERNAL MEDICINE

## 2019-07-02 PROCEDURE — 2022F DILAT RTA XM EVC RTNOPTHY: CPT | Performed by: INTERNAL MEDICINE

## 2019-07-02 PROCEDURE — 99214 OFFICE O/P EST MOD 30 MIN: CPT | Performed by: INTERNAL MEDICINE

## 2019-07-02 PROCEDURE — 3044F HG A1C LEVEL LT 7.0%: CPT | Performed by: INTERNAL MEDICINE

## 2019-07-02 PROCEDURE — 3017F COLORECTAL CA SCREEN DOC REV: CPT | Performed by: INTERNAL MEDICINE

## 2019-07-02 PROCEDURE — G8598 ASA/ANTIPLAT THER USED: HCPCS | Performed by: INTERNAL MEDICINE

## 2019-07-02 PROCEDURE — G8417 CALC BMI ABV UP PARAM F/U: HCPCS | Performed by: INTERNAL MEDICINE

## 2019-07-02 PROCEDURE — 1036F TOBACCO NON-USER: CPT | Performed by: INTERNAL MEDICINE

## 2019-07-02 RX ORDER — UBIDECARENONE 100 MG
CAPSULE ORAL
Refills: 3 | COMMUNITY
Start: 2019-06-12 | End: 2019-07-02

## 2019-07-02 RX ORDER — UBIDECARENONE 75 MG
100 CAPSULE ORAL DAILY
Qty: 30 TABLET | Refills: 5 | Status: SHIPPED | OUTPATIENT
Start: 2019-07-02 | End: 2019-11-20 | Stop reason: SDUPTHER

## 2019-07-02 RX ORDER — PREGABALIN 75 MG/1
CAPSULE ORAL
Qty: 60 CAPSULE | Refills: 2 | Status: SHIPPED | OUTPATIENT
Start: 2019-07-02 | End: 2019-09-09 | Stop reason: SDUPTHER

## 2019-07-02 ASSESSMENT — ENCOUNTER SYMPTOMS
BLOOD IN STOOL: 0
SHORTNESS OF BREATH: 0
TROUBLE SWALLOWING: 0
ABDOMINAL PAIN: 0
CHOKING: 0
EYE PAIN: 0
BLURRED VISION: 0
CHEST TIGHTNESS: 0
NAUSEA: 0
COUGH: 0
CHANGE IN BOWEL HABIT: 0

## 2019-07-02 NOTE — PROGRESS NOTES
35* 8 - 23 mg/dL Final    CREATININE 05/16/2019 1.95* 0.50 - 0.90 mg/dL Final    GFR Non- 05/16/2019 26.1* >60 Final    Comment: >60 mL/min/1.73m2 EGFR, calc. for ages 25 and older using the  MDRD formula (not corrected for weight), is valid for stable  renal function.  GFR  05/16/2019 31.5* >60 Final    Comment: >60 mL/min/1.73m2 EGFR, calc. for ages 25 and older using the  MDRD formula (not corrected for weight), is valid for stable  renal function.  Calcium 05/16/2019 9.6  8.5 - 9.9 mg/dL Final    Hemoglobin A1C 05/16/2019 6.4* 4.8 - 5.9 % Final       HPI:    Diabetes   She presents for her follow-up diabetic visit. She has type 2 diabetes mellitus. Her disease course has been fluctuating. There are no hypoglycemic associated symptoms. Pertinent negatives for hypoglycemia include no confusion, dizziness, nervousness/anxiousness or speech difficulty. Associated symptoms include fatigue and foot paresthesias. Pertinent negatives for diabetes include no blurred vision, no chest pain, no foot ulcerations, no polydipsia, no polyuria, no weakness and no weight loss. There are no hypoglycemic complications. Diabetic complications include nephropathy and peripheral neuropathy. Risk factors for coronary artery disease include diabetes mellitus, hypertension, obesity, sedentary lifestyle and post-menopausal. Current diabetic treatment includes intensive insulin program and oral agent (monotherapy). She is compliant with treatment most of the time. Her weight is stable. When asked about meal planning, she reported none. She rarely participates in exercise. Her home blood glucose trend is fluctuating minimally. Her bedtime blood glucose range is generally >200 mg/dl. An ACE inhibitor/angiotensin II receptor blocker is being taken. She sees a podiatrist.Eye exam is not current. Fatigue   This is a new problem. The current episode started 1 to 4 weeks ago.  The problem occurs improved health and help in better management of the current chronic conditions in the long run. The current medical regimen is effective;  continue present plan and medications. Orders Placed This Encounter   Medications    pregabalin (LYRICA) 75 MG capsule     Sig: TAKE (1) CAPSULE BY MOUTH TWICE DAILY     Dispense:  60 capsule     Refill:  2    vitamin B-12 (CYANOCOBALAMIN) 100 MCG tablet     Sig: Take 1 tablet by mouth daily     Dispense:  30 tablet     Refill:  5       Orders Placed This Encounter   Procedures    TSH with Reflex     Standing Status:   Future     Standing Expiration Date:   7/2/2020   Further workup and plan will be determined based on clinical progression and follow up test/ treatment results. Close follow up needed to evaluate treatment results and for care coordination. Return in about 2 months (around 9/2/2019). I have reviewed the patient's medical and surgical, family and social history, health maintenance schedule, and updated the computerized patient record. Please note this report has been partially produced by using speech recognition hardware. It may contain errors related to the system, including grammar, punctuation and spelling as well as words and phrases that may seem inaccurate. For anyquestions or concerns, please feel free to contact me for clarification.         Electronically signed by Nela Ray MD

## 2019-07-03 DIAGNOSIS — E11.40 CHRONIC PAINFUL DIABETIC NEUROPATHY (HCC): ICD-10-CM

## 2019-07-09 RX ORDER — PREGABALIN 75 MG/1
CAPSULE ORAL
Qty: 60 CAPSULE | Refills: 2 | OUTPATIENT
Start: 2019-07-09 | End: 2019-10-03

## 2019-08-01 DIAGNOSIS — I25.119 CORONARY ARTERY DISEASE INVOLVING NATIVE CORONARY ARTERY OF NATIVE HEART WITH ANGINA PECTORIS (HCC): Primary | Chronic | ICD-10-CM

## 2019-08-01 DIAGNOSIS — I10 ESSENTIAL HYPERTENSION: Chronic | ICD-10-CM

## 2019-08-12 RX ORDER — CLOPIDOGREL BISULFATE 75 MG/1
75 TABLET ORAL DAILY
Qty: 90 TABLET | Refills: 11 | Status: SHIPPED | OUTPATIENT
Start: 2019-08-12 | End: 2019-12-27

## 2019-08-13 ENCOUNTER — OFFICE VISIT (OUTPATIENT)
Dept: FAMILY MEDICINE CLINIC | Age: 59
End: 2019-08-13
Payer: MEDICARE

## 2019-08-13 ENCOUNTER — OFFICE VISIT (OUTPATIENT)
Dept: UROLOGY | Age: 59
End: 2019-08-13
Payer: MEDICARE

## 2019-08-13 VITALS
WEIGHT: 241 LBS | HEIGHT: 67 IN | SYSTOLIC BLOOD PRESSURE: 138 MMHG | RESPIRATION RATE: 16 BRPM | HEART RATE: 70 BPM | BODY MASS INDEX: 37.83 KG/M2 | DIASTOLIC BLOOD PRESSURE: 74 MMHG | TEMPERATURE: 98.1 F | OXYGEN SATURATION: 98 %

## 2019-08-13 VITALS
SYSTOLIC BLOOD PRESSURE: 118 MMHG | HEIGHT: 67 IN | BODY MASS INDEX: 36.88 KG/M2 | HEART RATE: 68 BPM | WEIGHT: 235 LBS | DIASTOLIC BLOOD PRESSURE: 64 MMHG

## 2019-08-13 DIAGNOSIS — I25.119 CORONARY ARTERY DISEASE INVOLVING NATIVE CORONARY ARTERY OF NATIVE HEART WITH ANGINA PECTORIS (HCC): Chronic | ICD-10-CM

## 2019-08-13 DIAGNOSIS — N18.4 CKD (CHRONIC KIDNEY DISEASE) STAGE 4, GFR 15-29 ML/MIN (HCC): Chronic | ICD-10-CM

## 2019-08-13 DIAGNOSIS — N39.0 RECURRENT UTI: Primary | ICD-10-CM

## 2019-08-13 DIAGNOSIS — Z79.4 CONTROLLED TYPE 2 DIABETES MELLITUS WITH DIABETIC NEUROPATHY, WITH LONG-TERM CURRENT USE OF INSULIN (HCC): ICD-10-CM

## 2019-08-13 DIAGNOSIS — E11.40 CONTROLLED TYPE 2 DIABETES MELLITUS WITH DIABETIC NEUROPATHY, WITH LONG-TERM CURRENT USE OF INSULIN (HCC): ICD-10-CM

## 2019-08-13 DIAGNOSIS — R21 SKIN RASH: ICD-10-CM

## 2019-08-13 DIAGNOSIS — G81.94 HEMIPARESIS, LEFT (HCC): ICD-10-CM

## 2019-08-13 DIAGNOSIS — I10 ESSENTIAL HYPERTENSION: Primary | Chronic | ICD-10-CM

## 2019-08-13 LAB
BILIRUBIN, POC: NORMAL
BLOOD URINE, POC: NORMAL
CLARITY, POC: CLEAR
COLOR, POC: YELLOW
GLUCOSE URINE, POC: NORMAL
KETONES, POC: NORMAL
LEUKOCYTE EST, POC: NORMAL
NITRITE, POC: NORMAL
PH, POC: 6
PROTEIN, POC: NORMAL
SPECIFIC GRAVITY, POC: 1.02
UROBILINOGEN, POC: 0.2

## 2019-08-13 PROCEDURE — G8598 ASA/ANTIPLAT THER USED: HCPCS | Performed by: FAMILY MEDICINE

## 2019-08-13 PROCEDURE — G8417 CALC BMI ABV UP PARAM F/U: HCPCS | Performed by: UROLOGY

## 2019-08-13 PROCEDURE — 3017F COLORECTAL CA SCREEN DOC REV: CPT | Performed by: UROLOGY

## 2019-08-13 PROCEDURE — G8598 ASA/ANTIPLAT THER USED: HCPCS | Performed by: UROLOGY

## 2019-08-13 PROCEDURE — 99214 OFFICE O/P EST MOD 30 MIN: CPT | Performed by: FAMILY MEDICINE

## 2019-08-13 PROCEDURE — G8417 CALC BMI ABV UP PARAM F/U: HCPCS | Performed by: FAMILY MEDICINE

## 2019-08-13 PROCEDURE — 99213 OFFICE O/P EST LOW 20 MIN: CPT | Performed by: UROLOGY

## 2019-08-13 PROCEDURE — 3017F COLORECTAL CA SCREEN DOC REV: CPT | Performed by: FAMILY MEDICINE

## 2019-08-13 PROCEDURE — G8427 DOCREV CUR MEDS BY ELIG CLIN: HCPCS | Performed by: FAMILY MEDICINE

## 2019-08-13 PROCEDURE — 81003 URINALYSIS AUTO W/O SCOPE: CPT | Performed by: UROLOGY

## 2019-08-13 PROCEDURE — 2022F DILAT RTA XM EVC RTNOPTHY: CPT | Performed by: FAMILY MEDICINE

## 2019-08-13 PROCEDURE — G8427 DOCREV CUR MEDS BY ELIG CLIN: HCPCS | Performed by: UROLOGY

## 2019-08-13 PROCEDURE — 1036F TOBACCO NON-USER: CPT | Performed by: UROLOGY

## 2019-08-13 PROCEDURE — 3044F HG A1C LEVEL LT 7.0%: CPT | Performed by: FAMILY MEDICINE

## 2019-08-13 PROCEDURE — 1036F TOBACCO NON-USER: CPT | Performed by: FAMILY MEDICINE

## 2019-08-13 RX ORDER — TRIAMCINOLONE ACETONIDE 1 MG/G
CREAM TOPICAL
Qty: 80 G | Refills: 1 | Status: SHIPPED | OUTPATIENT
Start: 2019-08-13 | End: 2019-09-19 | Stop reason: ALTCHOICE

## 2019-08-13 NOTE — PROGRESS NOTES
Subjective  Angel Luis Ovalle, 62 y.o. female presents today with:  Chief Complaint   Patient presents with    Diabetes    Hypertension     denies chest pain and headache. SOB at times.  Hyperlipidemia    Coronary Artery Disease    Obesity    Rash     x 2 weeks. bilateral arms. pt states very itchy, spreading. HPI    CAD s/p PA mid LAD. Also prior stents x 2. On Plavix. No chest pain. Has chronic shortness of breath. No tobacco use. Followed by Dr. Trey Dueñas. HAs appt with Dr. Guillermo Rowan in October.      Patient is here for DM f/u. Sugars have been moderately well-controlled around 180 and patient has not been experiencing hyper- or hypoglycemic symptoms. Compliance with meds is good and there are no side effects. Patient is not eating  low carb diet and does not exercise regularly. Is up to date on foot and eye exams and immunizations. Thinks sugar is going up because of recently decreased dose of insulin. Has chronic moderate to severe neuropathic pain bilateral feet for which she takes Lyrica.     [facility] services for ADLs: cognitive impairment; needs assist with bathing because she cannot stand for a long time and she is unable to reach her back; has h/o CVA and has left arm and leg hemiparesis balance is abnormal so uses walker; mild assist with toileting; needs assist with food prep because is unable to stand for prolonged periods. Needs med organizer but can admin insulin herself. Rash. For the last 2 weeks she has had itchy red bumps on her forearms. They are not spreading. She scratches them and they bleed and form scabs. Then, she picks the scabs. No known exposures to parasitic infestation.     No other questions and or concerns for today's visit      Review of Systems  No fevers, chills, sweats. No unintended weight loss. No abdominal pain, nausea, vomiting, diarrhea, constipation, bloody stools, black tarry stools. No rashes. No swollen glands.       Past Medical History: Diagnosis Date    Anxiety     CAD S/P percutaneous coronary angioplasty ,     stent per dr Baron Manuel Diabetic nephropathy with proteinuria ([facility]     DJD (degenerative joint disease) of knee     Dr Asad Carl GERD (gastroesophageal reflux disease)     Hemiparesis, left ([facility] [address] [facility] 2013    [facility] ([facility]    History of seizures     History of type C viral hepatitis     HTN (hypertension)     Hyperlipidemia     Impaired mobility and activities of daily living     Mediastinal lymphadenopathy     Dr Pau Neely, [facility]    Metabolic syndrome     Neurogenic urinary incontinence 2013    Neuropathy in diabetes ([facility]     Obesity (BMI 30-39. 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 Wendy Hutchins      x1 Dr. Jared Brooks, Dr Aubree Lopes 2018    HYSTERECTOMY, TOTAL ABDOMINAL      one ovary intact, Dr Gaurang Gutiérrez, menorrhagia    [place] TOTAL KNEE ARTHROPLASTY Left 2018    LEFT KNEE TOTAL KNEE ARTHROPLASTY, SHAYNA, NERVE BLOCK performed by Leonidas Turcios MD at [address] [place] [address] Right     TOTAL KNEE ARTHROPLASTY  16    Dr Loya Broad History     Socioeconomic History    Marital status:      Spouse name: Not on file    Number of children: 2    Years of education: Not on file   Jazmin Germain

## 2019-08-13 NOTE — PROGRESS NOTES
Intimate partner violence:     Fear of current or ex partner: None     Emotionally abused: None     Physically abused: None     Forced sexual activity: None   Other Topics Concern    None   Social History Narrative    Born in [place], one of 5    Twin sister Reyes, very ill in 2018, [place] [address]    Moved to [facility], , 2 children, one son and one daughter    Worked at [employer], as a nurse's aide    Disabled due to mental illness    Lived at [facility], was discharged, returned to independent living in 2017 in the daughter's house and has adjusted well    One son and one daughter, live in the same house with patient, Micah Dance pays the rent    [employer])     Family History   Problem Relation Age of Onset    Cancer Mother 76        survived   [facility] Hypertension Father     Diabetes Sister     Mental Illness Sister      Current Outpatient Medications   Medication Sig Dispense Refill    clopidogrel (PLAVIX) 75 MG tablet TAKE 1 TABLET BY MOUTH DAILY 90 tablet 11    TRADJENTA 5 MG tablet TAKE 1 TABLET BY MOUTH DAILY 90 tablet 1    pregabalin (LYRICA) 75 MG capsule TAKE (1) CAPSULE BY MOUTH TWICE DAILY 60 capsule 2    NOVOFINE AUTOCOVER 30G X 8 MM MISC USE AS DIRECTED THREE TIMES A DAY  3    vitamin B-12 (CYANOCOBALAMIN) 100 MCG tablet Take 1 tablet by mouth daily 30 tablet 5    ARIPiprazole (ABILIFY) 5 MG tablet TAKE 1 TABLET BY MOUTH DAILY 90 tablet 1    BASAGLAR KWIKPEN 100 UNIT/ML injection pen INJECT 20 UNITS SUBCUTANEOUSLY TWICE DAILY 15 mL 10    isosorbide mononitrate (IMDUR) 60 MG extended release tablet Take 1 tablet by mouth daily 30 tablet 3    carvedilol (COREG) 6.25 MG tablet TAKE 1 TABLET BY MOUTH TWICE DAILY WITH MEALS 180 tablet 11    Magnesium 400 MG CAPS Take 1 capsule by mouth daily 30 capsule 11    FREESTYLE LITE strip TEST BLOOD GLUCOSE FOUR TIMES DAILY 300 strip 5    insulin aspart (NOVOLOG FLEXPEN) 100 UNIT/ML injection pen INJECT 18 UNITS

## 2019-08-27 ENCOUNTER — CARE COORDINATION (OUTPATIENT)
Dept: CARE COORDINATION | Age: 59
End: 2019-08-27

## 2019-08-27 DIAGNOSIS — R53.83 FATIGUE, UNSPECIFIED TYPE: ICD-10-CM

## 2019-08-27 LAB — TSH REFLEX: 1.3 UIU/ML (ref 0.44–3.86)

## 2019-08-29 NOTE — CARE COORDINATION
independently?:  No     Current Housing:  Private Residence        Per the [address], did the patient have 2 or more falls or 1 fall with injury in the past year?:  Yes  How often do you think you are about to fall and you do NOT fall? For example, you grab something to stabilize yourself or hold onto a wall/furniture?:  Rarely  Use of a Mobility Aid:  Yes  Difficulty walking/impaired gait:  Yes  Issues with feet or shoes like numbness, edema, shoes not fitting:  No  Changes in vision, poor vision or poor lighting in environment:  No  Dizziness:  No     Frequent urination at night?:  Yes  Do you use rails/bars?:  No  Do you have a non-slip tub mat?:  Yes     Are you experiencing loss of meaning?:  No  Are you experiencing loss of hope and peace?:  No     Thinking about your patient's physical health needs, are there any symptoms or problems (risk indicators) you are unsure about that require further investigation?:  No identified areas of uncertainly or problems already being investigated   Are the patients physical health problems impacting on their mental well-being?:  Mild impact on mental well-being e.g. \"\"feeling fed-up\"\", \"\"reduced enjoyment\"\"   Are there any problems with your patients lifestyle behaviors (alcohol, drugs, diet, exercise) that are impacting on physical or mental well-being?:  No identified areas of concern   Do you have any other concerns about your patients mental well-being?  How would you rate their severity and impact on the patient?:  No identified areas of concern   How would you rate their home environment in terms of safety and stability (including domestic violence, insecure housing, neighbor harassment)?:  Consistently safe, supportive, stable, no identified problems   How do daily activities impact on the patient's well-being? (include current or anticipated unemployment, work, caregiving, access to transportation or other):  Some general dissatisfaction but no concern

## 2019-08-30 ENCOUNTER — CARE COORDINATION (OUTPATIENT)
Dept: CARE COORDINATION | Age: 59
End: 2019-08-30

## 2019-08-30 NOTE — PATIENT INSTRUCTIONS
Patient Education        Hypoglycemia: Care Instructions  Your Care Instructions    Hypoglycemia means that your blood sugar is low and your body is not getting enough fuel. Some people get low blood sugar from not eating often enough. Some medicines to treat diabetes can cause low blood sugar. People who have had surgery on their stomachs or intestines may get hypoglycemia. Problems with the pancreas, kidneys, or liver also can cause low blood sugar. A snack or drink with sugar in it will raise your blood sugar and should ease your symptoms right away. Your doctor may recommend that you change or stop your medicines until you can get your blood sugar levels under control. In the long run, you may need to change your diet and eating habits so that you get enough fuel for your body throughout the day. Follow-up care is a key part of your treatment and safety. Be sure to make and go to all appointments, and call your doctor if you are having problems. It's also a good idea to know your test results and keep a list of the medicines you take. How can you care for yourself at home? · Learn to recognize the early signs of low blood sugar. Signs include:  ? Nausea. ? Hunger. ? Feeling nervous, irritable, or shaky. ? Cold, clammy, wet skin. ? Sweating (when you are not exercising). ? A fast heartbeat.  ? Numbness or tingling of the fingertips or lips. · If you feel an episode of low blood sugar coming on, drink fruit juice or sugared (not diet) soda, or eat sugar in the form of candy, cubes, or tablets. [employer] are another [employer]. · Eat small, frequent meals so that you do not get too hungry between meals. · Balance extra exercise with eating more. · Keep a written record of your low blood sugar episodes, including when you last ate and what you ate, so that you can learn what causes your blood sugar to drop.   · Make sure your family, friends, and coworkers know the symptoms of low blood sugar and [email or web address] and sign in to your [employer] account. Enter L328 in the [employer] box to learn more about \"Learning About Low Blood Sugar (Hypoglycemia) in Diabetes. \"     If you do not have an account, please click on the \"Sign Up Now\" link. Current as of: July 25, 2018  Content Version: 12.1  © 8405-2853 Healthwise, Incorporated. Care instructions adapted under license by [facility] ([facility]). If you have questions about a medical condition or this instruction, always ask your healthcare professional. [place] [address] any warranty or liability for your use of this information.

## 2019-09-03 ENCOUNTER — OFFICE VISIT (OUTPATIENT)
Dept: INTERNAL MEDICINE CLINIC | Age: 59
End: 2019-09-03
Payer: MEDICARE

## 2019-09-03 ENCOUNTER — CARE COORDINATION (OUTPATIENT)
Dept: CARE COORDINATION | Age: 59
End: 2019-09-03

## 2019-09-03 VITALS
SYSTOLIC BLOOD PRESSURE: 132 MMHG | TEMPERATURE: 97.8 F | OXYGEN SATURATION: 92 % | HEART RATE: 70 BPM | BODY MASS INDEX: 36.62 KG/M2 | WEIGHT: 233.8 LBS | DIASTOLIC BLOOD PRESSURE: 78 MMHG

## 2019-09-03 DIAGNOSIS — E11.40 CONTROLLED TYPE 2 DIABETES MELLITUS WITH DIABETIC NEUROPATHY, WITH LONG-TERM CURRENT USE OF INSULIN (HCC): Primary | ICD-10-CM

## 2019-09-03 DIAGNOSIS — I25.119 CORONARY ARTERY DISEASE INVOLVING NATIVE CORONARY ARTERY OF NATIVE HEART WITH ANGINA PECTORIS (HCC): ICD-10-CM

## 2019-09-03 DIAGNOSIS — L30.9 DERMATITIS, UNSPECIFIED: ICD-10-CM

## 2019-09-03 DIAGNOSIS — R06.09 EXERTIONAL DYSPNEA: ICD-10-CM

## 2019-09-03 DIAGNOSIS — Z79.4 CONTROLLED TYPE 2 DIABETES MELLITUS WITH DIABETIC NEUROPATHY, WITH LONG-TERM CURRENT USE OF INSULIN (HCC): Primary | ICD-10-CM

## 2019-09-03 LAB — HBA1C MFR BLD: 6.9 %

## 2019-09-03 PROCEDURE — G8427 DOCREV CUR MEDS BY ELIG CLIN: HCPCS | Performed by: INTERNAL MEDICINE

## 2019-09-03 PROCEDURE — 83036 HEMOGLOBIN GLYCOSYLATED A1C: CPT | Performed by: INTERNAL MEDICINE

## 2019-09-03 PROCEDURE — 3017F COLORECTAL CA SCREEN DOC REV: CPT | Performed by: INTERNAL MEDICINE

## 2019-09-03 PROCEDURE — 1036F TOBACCO NON-USER: CPT | Performed by: INTERNAL MEDICINE

## 2019-09-03 PROCEDURE — G8417 CALC BMI ABV UP PARAM F/U: HCPCS | Performed by: INTERNAL MEDICINE

## 2019-09-03 PROCEDURE — 2022F DILAT RTA XM EVC RTNOPTHY: CPT | Performed by: INTERNAL MEDICINE

## 2019-09-03 PROCEDURE — 3044F HG A1C LEVEL LT 7.0%: CPT | Performed by: INTERNAL MEDICINE

## 2019-09-03 PROCEDURE — G8598 ASA/ANTIPLAT THER USED: HCPCS | Performed by: INTERNAL MEDICINE

## 2019-09-03 PROCEDURE — 99214 OFFICE O/P EST MOD 30 MIN: CPT | Performed by: INTERNAL MEDICINE

## 2019-09-03 RX ORDER — CEPHALEXIN 250 MG/1
250 CAPSULE ORAL 3 TIMES DAILY
Qty: 9 CAPSULE | Refills: 0 | Status: SHIPPED | OUTPATIENT
Start: 2019-09-03 | End: 2019-09-06

## 2019-09-03 RX ORDER — LANOLIN ALCOHOL/MO/W.PET/CERES
3 CREAM (GRAM) TOPICAL NIGHTLY PRN
Qty: 30 TABLET | Refills: 1 | Status: SHIPPED | OUTPATIENT
Start: 2019-09-03 | End: 2019-12-27

## 2019-09-03 ASSESSMENT — ENCOUNTER SYMPTOMS
SINUS PRESSURE: 0
DIARRHEA: 0
COUGH: 0
SPUTUM PRODUCTION: 0
SHORTNESS OF BREATH: 1
WHEEZING: 0
NAIL CHANGES: 0
BACK PAIN: 1
EYE PAIN: 0

## 2019-09-03 NOTE — PROGRESS NOTES
06/05/2019 92  40 - 130 U/L Final    ALT 06/05/2019 13  0 - 33 U/L Final    AST 06/05/2019 16  0 - 35 U/L Final    Globulin 06/05/2019 3.2  2.3 - 3.5 g/dL Final   Orders Only on 06/05/2019   Component Date Value Ref Range Status    WBC 06/05/2019 8.7  4.8 - 10.8 K/uL Final    RBC 06/05/2019 3.88* 4.20 - 5.40 M/uL Final    Hemoglobin 06/05/2019 11.1* 12.0 - 16.0 g/dL Final    Hematocrit 06/05/2019 31.7* 37.0 - 47.0 % Final    MCV 06/05/2019 81.6* 82.0 - 100.0 fL Final    MCH 06/05/2019 28.6  27.0 - 31.3 pg Final    MCHC 06/05/2019 35.1  33.0 - 37.0 % Final    RDW 06/05/2019 14.6* 11.5 - 14.5 % Final    Platelets 61/58/3421 224  130 - 400 K/uL Final    Neutrophils % 06/05/2019 74.1  % Final    Lymphocytes % 06/05/2019 16.1  % Final    Monocytes % 06/05/2019 6.2  % Final    Eosinophils % 06/05/2019 2.1  % Final    Basophils % 06/05/2019 1.5  % Final    Neutrophils Absolute 06/05/2019 6.4  1.4 - 6.5 K/uL Final    Lymphocytes Absolute 06/05/2019 1.4  1.0 - 4.8 K/uL Final    Monocytes Absolute 06/05/2019 0.5  0.2 - 0.8 K/uL Final    Eosinophils Absolute 06/05/2019 0.2  0.0 - 0.7 K/uL Final    Basophils Absolute 06/05/2019 0.1  0.0 - 0.2 K/uL Final       HPI:    Diabetes   She presents for her follow-up diabetic visit. She has type 2 diabetes mellitus. Hypoglycemia symptoms include nervousness/anxiousness. Pertinent negatives for hypoglycemia include no dizziness or tremors. Associated symptoms include fatigue and foot paresthesias. Pertinent negatives for diabetes include no chest pain, no foot ulcerations, no polydipsia, no polyuria and no weight loss. There are no hypoglycemic complications. Diabetic complications include nephropathy and peripheral neuropathy. Risk factors for coronary artery disease include diabetes mellitus, hypertension, obesity, sedentary lifestyle and post-menopausal. Current diabetic treatment includes insulin injections and oral agent (monotherapy).  She is compliant with activity: Not Currently   Lifestyle    Physical activity:     Days per week: Not on file     Minutes per session: Not on file    Stress: Not on file   Relationships    Social connections:     Talks on phone: Not on file     Gets together: Not on file     Attends Scientologist service: Not on file     Active member of club or organization: Not on file     Attends meetings of clubs or organizations: Not on file     Relationship status: Not on file    Intimate partner violence:     Fear of current or ex partner: Not on file     Emotionally abused: Not on file     Physically abused: Not on file     Forced sexual activity: Not on file   Other Topics Concern    Not on file   Social History Narrative    Born in [place], one of 5    Twin sister Reyes, very ill in 2018, [place] [address]    Moved to [facility], , 2 children, one son and one daughter    Worked at [employer], as a nurse's aide    Disabled due to mental illness    Lived at [email or web address], was discharged, returned to independent living in 2017 in the daughter's house and has adjusted well    One son and one daughter, live in the same house with patient, Germania Crawley pays the rent    [employer] reading ([employer])       Family History   Problem Relation Age of Onset    Cancer Mother 76        survived    Hypertension Father     Diabetes Sister     Mental Illness Sister        Family and socialhistory were reviewed and pertinent changes documented. ALLERGIES    Codeine    Current Outpatient Medications on File Prior to Visit   Medication Sig Dispense Refill    isosorbide mononitrate (IMDUR) 60 MG extended release tablet TAKE 1 TABLET BY MOUTH DAILY 30 tablet 10    triamcinolone (KENALOG) 0.1 % cream Apply topically 2 times daily.  80 g 1    clopidogrel (PLAVIX) 75 MG tablet TAKE 1 TABLET BY MOUTH DAILY 90 tablet 11    TRADJENTA 5 MG tablet TAKE 1 TABLET BY MOUTH DAILY 90 tablet 1    pregabalin (LYRICA) 75 MG capsule TAKE (1) CAPSULE BY MOUTH capsule 1    nitroGLYCERIN (NITROSTAT) 0.4 MG SL tablet DISSOLVE 1 TAB UNDER THE TONGUE AS NEEDED FOR CHEST PAIN EVERY 5 MINUTES UP TO 3 TIMES. IF NO RELIEF CALL 911. 75 tablet 1    Blood Pressure KIT 1 Device by Does not apply route daily 1 kit 0    Alcohol Swabs (EASY TOUCH ALCOHOL PREP MEDIUM) 70 % PADS USE AS DIRECTED THREE TIMES A  each 3     No current facility-administered medications on file prior to visit. Review of Systems   Constitutional: Positive for fatigue. Negative for weight loss. HENT: Negative for congestion, nosebleeds and sinus pressure. Eyes: Negative for pain. Respiratory: Positive for shortness of breath. Negative for cough, sputum production and wheezing. Cardiovascular: Negative for chest pain, palpitations, claudication, leg swelling and PND. Gastrointestinal: Negative for diarrhea. Endocrine: Negative for cold intolerance, heat intolerance, polydipsia and polyuria. Genitourinary: Negative for difficulty urinating, dysuria and hematuria. Musculoskeletal: Positive for back pain. Negative for myalgias. Skin: Positive for rash. Negative for nail changes. Neurological: Negative for dizziness, tremors and light-headedness. Psychiatric/Behavioral: Positive for sleep disturbance (unable to sleep more than 2-3 hours nightly). Negative for decreased concentration and dysphoric mood. The patient is nervous/anxious. Vitals:    09/03/19 0918   BP: 132/78   Site: Right Wrist   Position: Sitting   Cuff Size: Small Adult   Pulse: 70   Temp: 97.8 °F (36.6 °C)   TempSrc: Temporal   SpO2: 92%   Weight: 233 lb 12.8 oz (106.1 kg)       Physical Exam   Constitutional: She is oriented to person, place, and time. No distress. Obese, age appropriate, grooming fair     HENT:   Head: Atraumatic. Eyes: Conjunctivae are normal. No scleral icterus. Neck: Neck supple. No JVD present. No thyromegaly present. Cardiovascular: Regular rhythm and normal heart sounds.

## 2019-09-09 DIAGNOSIS — E11.22 TYPE 2 DIABETES MELLITUS WITH STAGE 3 CHRONIC KIDNEY DISEASE, WITH LONG-TERM CURRENT USE OF INSULIN (HCC): Primary | ICD-10-CM

## 2019-09-09 DIAGNOSIS — Z79.4 TYPE 2 DIABETES MELLITUS WITH STAGE 3 CHRONIC KIDNEY DISEASE, WITH LONG-TERM CURRENT USE OF INSULIN (HCC): Primary | ICD-10-CM

## 2019-09-09 DIAGNOSIS — N18.30 TYPE 2 DIABETES MELLITUS WITH STAGE 3 CHRONIC KIDNEY DISEASE, WITH LONG-TERM CURRENT USE OF INSULIN (HCC): Primary | ICD-10-CM

## 2019-09-10 ENCOUNTER — CARE COORDINATION (OUTPATIENT)
Dept: CARE COORDINATION | Age: 59
End: 2019-09-10

## 2019-09-11 NOTE — CARE COORDINATION
Ambulatory Care Coordination Note  9/10/2019   CM Risk Score: 6  Charlson 10 Year Mortality Risk Score: 100%     ACC: Gerson Russ, RN    Summary Note: call to walter to discuss health coaching. She reports her blood sugar is ranging in the 150's. She is attempting to limit carbohydrate intake. Discussion of hba1c with noting her last one was 6.9. Will also mail information. Also discussion of vaccinations        Care Coordination Interventions    Program Enrollment:  Complex Care  Referral from Primary Care Provider:  No  Suggested Interventions and Community Resources  Medi Set or Pill Pack:  Completed (Comment: exact care)  Zone Management Tools:  Completed (Comment: 8/29 mailed)         Goals Addressed                 This Visit's Progress     Conditions and Symptoms   No change     I will follow my Zone Management tool to seek urgent or emergent care. I will notify my provider of any symptoms that indicate a worsening of my condition. Barriers: none  Plan for overcoming my barriers: care coordination  Confidence: 7/10  Anticipated Goal Completion Date: 11/27/2019              Prior to Admission medications    Medication Sig Start Date End Date Taking? Authorizing Provider   pregabalin (LYRICA) 75 MG capsule TAKE ONE (1) CAPSULE BY MOUTH TWICE DAILY 9/10/19 12/10/19  Loli Hogue MD   melatonin 3 MG TABS tablet Take 1 tablet by mouth nightly as needed (insomnia) 9/3/19   Loli Hogue MD   isosorbide mononitrate (IMDUR) 60 MG extended release tablet TAKE 1 TABLET BY MOUTH DAILY 8/28/19   Sebastian Long MD   triamcinolone (KENALOG) 0.1 % cream Apply topically 2 times daily.  8/13/19   Stephen Del Real MD   clopidogrel (PLAVIX) 75 MG tablet TAKE 1 TABLET BY MOUTH DAILY 8/12/19   Sebastian Long MD   TRADJENTA 5 MG tablet TAKE 1 TABLET BY MOUTH DAILY 7/18/19   Stephen Del Real MD   NOVOFINE AUTOCOVER 30G X 8 MM MISC USE AS DIRECTED THREE TIMES A DAY 5/17/19   Historical 500 MG tablet TAKE 1 TABLET BY MOUTH EVERY 6 HOURS AS NEEDED FOR PAIN OR FEVER 11/14/18   Shyla Reyes MD   omeprazole (PRILOSEC) 20 MG delayed release capsule TAKE 1 CAPSULE BY MOUTH ONCE DAILY AS NEEDED FOR HEARTBURN 8/31/18   Shyla Reyes MD   nitroGLYCERIN (NITROSTAT) 0.4 MG SL tablet DISSOLVE 1 TAB UNDER THE TONGUE AS NEEDED FOR CHEST PAIN EVERY 5 MINUTES UP TO 3 TIMES.  IF NO RELIEF CALL 911. 8/31/18   Shyla Reyes MD   Blood Pressure KIT 1 Device by Does not apply route daily 3/1/18   Shyla Reyes MD   Alcohol Swabs (EASY TOUCH ALCOHOL PREP MEDIUM) 70 % PADS USE AS DIRECTED THREE TIMES A DAY 7/3/17   Shyla Reyes MD       Future Appointments   Date Time Provider Aminata Cortes   10/2/2019 10:30 AM Pauly Tovar MD [address] [place] [address]   11/18/2019 11:15 AM Anjana Ramos MD [facility]   12/3/2019 10:00 AM Shyla Reyes MD [facility]

## 2019-09-12 ENCOUNTER — TELEPHONE (OUTPATIENT)
Dept: PHARMACY | Facility: CLINIC | Age: 59
End: 2019-09-12

## 2019-09-13 ENCOUNTER — TELEPHONE (OUTPATIENT)
Dept: INTERNAL MEDICINE CLINIC | Age: 59
End: 2019-09-13

## 2019-09-17 ENCOUNTER — CARE COORDINATION (OUTPATIENT)
Dept: CARE COORDINATION | Age: 59
End: 2019-09-17

## 2019-09-18 DIAGNOSIS — E11.40 CONTROLLED TYPE 2 DIABETES MELLITUS WITH DIABETIC NEUROPATHY, WITH LONG-TERM CURRENT USE OF INSULIN (HCC): Primary | ICD-10-CM

## 2019-09-18 DIAGNOSIS — Z79.4 CONTROLLED TYPE 2 DIABETES MELLITUS WITH DIABETIC NEUROPATHY, WITH LONG-TERM CURRENT USE OF INSULIN (HCC): Primary | ICD-10-CM

## 2019-09-18 NOTE — TELEPHONE ENCOUNTER
The pharmacy called they needed the pen needles to say 5 times a day to cover both pens she is taking. The problem was with both scripts the insurance looked at it as a duplicate and would not fill and she was running out. I gave the pharmacy a verbal order to update script.

## 2019-09-19 ENCOUNTER — SCHEDULED TELEPHONE ENCOUNTER (OUTPATIENT)
Dept: PHARMACY | Facility: CLINIC | Age: 59
End: 2019-09-19

## 2019-09-19 NOTE — CARE COORDINATION
Ambulatory Care Coordination Note  9/17/2019  CM Risk Score: 6  Charlson 10 Year Mortality Risk Score: 100%     ACC: Renetta Sanchez RN    Summary Note: call to walter to discuss health . She reports today blood sugar 235. That is elevated for her. She usually ranges in the middle 100's. She states she has not been exercising and did have some pasta. She limited pasta portion. She will contact pcp office if blood sugar continues to be elevated on Friday. Discussed hyperglycemia symptoms. She reports frequent urination but states that is not new. She also has increase in thirst. Reports compliance with medication  Diabetes Assessment    Medic Alert ID:  No  Meal Planning:  Avoidance of concentrated sweets, Carb counting   How often do you test your blood sugar?:  Meals, Bedtime   Do you have barriers with adherence to non-pharmacologic self-management interventions? (Nutrition/Exercise/Self-Monitoring):  Yes   Have you ever had to go to the ED for symptoms of low blood sugar?:  No       Increase or Decrease trend in Blood Sugars              Care Coordination Interventions    Program Enrollment:  Complex Care  Referral from Primary Care Provider:  No  Suggested Interventions and Community Resources  Medi Set or Pill Pack:  Completed (Comment: exact care)  Pharmacist:  Completed (Comment: 9/11 in basket)  Zone Management Tools:  Completed (Comment: 8/29 mailed)         Goals Addressed                 This Visit's Progress     Conditions and Symptoms   Worsening     I will follow my Zone Management tool to seek urgent or emergent care. I will notify my provider of any symptoms that indicate a worsening of my condition. Barriers: none  Plan for overcoming my barriers: care coordination  Confidence: 7/10  Anticipated Goal Completion Date: 11/27/2019              Prior to Admission medications    Medication Sig Start Date End Date Taking?  Authorizing Provider   Insulin Pen Needle (BD AUTOSHIELD DUO) 30G X 5 MM MISC USE

## 2019-09-19 NOTE — TELEPHONE ENCOUNTER
Zoya Conte MD, medication review completed with patient:  - Patient with shortness of breath at rest. She is pretty winded during a conversation over the phone.   - Patient would like to move up her apt with you due to this SOB. She does see cardio next month but wanted to get lungs checked as well. - She is having to sleep on 2 pillows at night but states no edema or weight increase      Thank you,  Alejandro AsherChristiana [address]   Toll Free: [phone] option 7  =======================================================    CLINICAL PHARMACY NOTE - Medication Review  Patient outreach to review medications - Spoke with patient. SUBJECTIVE/OBJECTIVE:   Idalia Queen is a 62 y.o. female referred to a clinical pharmacy specialist given their history of DM.     Medications:  Medication Sig    pregabalin (LYRICA) 75 MG capsule TAKE ONE (1) CAPSULE BY MOUTH TWICE DAILY    melatonin 3 MG TABS tablet Take 1 tablet by mouth nightly as needed (insomnia)    isosorbide mononitrate (IMDUR) 60 MG extended release tablet TAKE 1 TABLET BY MOUTH DAILY    clopidogrel (PLAVIX) 75 MG tablet TAKE 1 TABLET BY MOUTH DAILY    TRADJENTA 5 MG tablet TAKE 1 TABLET BY MOUTH DAILY    vitamin B-12 (CYANOCOBALAMIN) 100 MCG tablet Take 1 tablet by mouth daily    ARIPiprazole (ABILIFY) 5 MG tablet TAKE 1 TABLET BY MOUTH DAILY    BASAGLAR KWIKPEN 100 UNIT/ML injection pen INJECT 20 UNITS SUBCUTANEOUSLY TWICE DAILY    carvedilol (COREG) 6.25 MG tablet TAKE 1 TABLET BY MOUTH TWICE DAILY WITH MEALS    Magnesium 400 MG CAPS Take 1 capsule by mouth daily    insulin aspart (NOVOLOG FLEXPEN) 100 UNIT/ML injection pen INJECT 18 UNITS SUBCUTANEOUSLY BEFORE BREAKFAST AND DINNER AND 8 UNITS BEFORE LUNCH    nystatin (MYCOSTATIN) 994044 UNIT/GM powder APPLY TO ABDOMINAL FOLDS EVERY 12 HOURS AS NEEDED    sertraline (ZOLOFT) 50 MG tablet TAKE (1) TABLET BY MOUTH DAILY    nitrofurantoin

## 2019-09-24 ENCOUNTER — TELEPHONE (OUTPATIENT)
Dept: INTERNAL MEDICINE CLINIC | Age: 59
End: 2019-09-24

## 2019-09-24 NOTE — TELEPHONE ENCOUNTER
Called the patient to inform her that her prescription was ready for  and asked her if it was easier to have me fax it. I am faxing the prescription to [facility] .

## 2019-10-02 ENCOUNTER — APPOINTMENT (OUTPATIENT)
Dept: GENERAL RADIOLOGY | Age: 59
DRG: 250 | End: 2019-10-02
Attending: INTERNAL MEDICINE
Payer: MEDICARE

## 2019-10-02 ENCOUNTER — HOSPITAL ENCOUNTER (INPATIENT)
Age: 59
LOS: 5 days | Discharge: HOME HEALTH | DRG: 250 | End: 2019-10-08
Attending: INTERNAL MEDICINE | Admitting: INTERNAL MEDICINE
Payer: MEDICARE

## 2019-10-02 ENCOUNTER — OFFICE VISIT (OUTPATIENT)
Dept: CARDIOLOGY CLINIC | Age: 59
End: 2019-10-02
Payer: MEDICARE

## 2019-10-02 VITALS
DIASTOLIC BLOOD PRESSURE: 66 MMHG | RESPIRATION RATE: 18 BRPM | HEART RATE: 65 BPM | BODY MASS INDEX: 36.56 KG/M2 | WEIGHT: 233.4 LBS | SYSTOLIC BLOOD PRESSURE: 130 MMHG | OXYGEN SATURATION: 96 %

## 2019-10-02 DIAGNOSIS — I25.119 CORONARY ARTERY DISEASE INVOLVING NATIVE CORONARY ARTERY OF NATIVE HEART WITH ANGINA PECTORIS (HCC): Primary | Chronic | ICD-10-CM

## 2019-10-02 DIAGNOSIS — N18.4 CKD (CHRONIC KIDNEY DISEASE) STAGE 4, GFR 15-29 ML/MIN (HCC): Primary | Chronic | ICD-10-CM

## 2019-10-02 DIAGNOSIS — R06.02 SHORTNESS OF BREATH: ICD-10-CM

## 2019-10-02 DIAGNOSIS — E78.2 MIXED HYPERLIPIDEMIA: Chronic | ICD-10-CM

## 2019-10-02 DIAGNOSIS — I10 ESSENTIAL HYPERTENSION: Chronic | ICD-10-CM

## 2019-10-02 LAB
ALBUMIN SERPL-MCNC: 3.6 G/DL (ref 3.5–4.6)
ALP BLD-CCNC: 104 U/L (ref 40–130)
ALT SERPL-CCNC: 14 U/L (ref 0–33)
ANION GAP SERPL CALCULATED.3IONS-SCNC: 13 MEQ/L (ref 9–15)
AST SERPL-CCNC: 16 U/L (ref 0–35)
BILIRUB SERPL-MCNC: 0.5 MG/DL (ref 0.2–0.7)
BUN BLDV-MCNC: 30 MG/DL (ref 8–23)
CALCIUM SERPL-MCNC: 10 MG/DL (ref 8.5–9.9)
CHLORIDE BLD-SCNC: 103 MEQ/L (ref 95–107)
CO2: 20 MEQ/L (ref 20–31)
CREAT SERPL-MCNC: 2.38 MG/DL (ref 0.5–0.9)
GFR AFRICAN AMERICAN: 25
GFR NON-AFRICAN AMERICAN: 20.7
GLOBULIN: 3.6 G/DL (ref 2.3–3.5)
GLUCOSE BLD-MCNC: 102 MG/DL (ref 60–115)
GLUCOSE BLD-MCNC: 196 MG/DL (ref 60–115)
GLUCOSE BLD-MCNC: 223 MG/DL (ref 70–99)
HCT VFR BLD CALC: 31.4 % (ref 37–47)
HEMOGLOBIN: 10.6 G/DL (ref 12–16)
LV EF: 60 %
LVEF MODALITY: NORMAL
MCH RBC QN AUTO: 28.6 PG (ref 27–31.3)
MCHC RBC AUTO-ENTMCNC: 33.7 % (ref 33–37)
MCV RBC AUTO: 85 FL (ref 82–100)
PDW BLD-RTO: 14.4 % (ref 11.5–14.5)
PERFORMED ON: ABNORMAL
PERFORMED ON: NORMAL
PLATELET # BLD: 198 K/UL (ref 130–400)
POTASSIUM REFLEX MAGNESIUM: 5.1 MEQ/L (ref 3.4–4.9)
RBC # BLD: 3.7 M/UL (ref 4.2–5.4)
SODIUM BLD-SCNC: 136 MEQ/L (ref 135–144)
TOTAL PROTEIN: 7.2 G/DL (ref 6.3–8)
TROPONIN: <0.01 NG/ML (ref 0–0.01)
TROPONIN: <0.01 NG/ML (ref 0–0.01)
WBC # BLD: 9 K/UL (ref 4.8–10.8)

## 2019-10-02 PROCEDURE — G8598 ASA/ANTIPLAT THER USED: HCPCS | Performed by: INTERNAL MEDICINE

## 2019-10-02 PROCEDURE — 96376 TX/PRO/DX INJ SAME DRUG ADON: CPT

## 2019-10-02 PROCEDURE — 93306 TTE W/DOPPLER COMPLETE: CPT

## 2019-10-02 PROCEDURE — 3017F COLORECTAL CA SCREEN DOC REV: CPT | Performed by: INTERNAL MEDICINE

## 2019-10-02 PROCEDURE — 6370000000 HC RX 637 (ALT 250 FOR IP): Performed by: PHYSICIAN ASSISTANT

## 2019-10-02 PROCEDURE — 84484 ASSAY OF TROPONIN QUANT: CPT

## 2019-10-02 PROCEDURE — 99214 OFFICE O/P EST MOD 30 MIN: CPT | Performed by: INTERNAL MEDICINE

## 2019-10-02 PROCEDURE — 2500000003 HC RX 250 WO HCPCS: Performed by: INTERNAL MEDICINE

## 2019-10-02 PROCEDURE — 1036F TOBACCO NON-USER: CPT | Performed by: INTERNAL MEDICINE

## 2019-10-02 PROCEDURE — 85027 COMPLETE CBC AUTOMATED: CPT

## 2019-10-02 PROCEDURE — 51702 INSERT TEMP BLADDER CATH: CPT

## 2019-10-02 PROCEDURE — G0378 HOSPITAL OBSERVATION PER HR: HCPCS

## 2019-10-02 PROCEDURE — 96374 THER/PROPH/DIAG INJ IV PUSH: CPT

## 2019-10-02 PROCEDURE — 96372 THER/PROPH/DIAG INJ SC/IM: CPT

## 2019-10-02 PROCEDURE — 6370000000 HC RX 637 (ALT 250 FOR IP): Performed by: INTERNAL MEDICINE

## 2019-10-02 PROCEDURE — 2580000003 HC RX 258: Performed by: INTERNAL MEDICINE

## 2019-10-02 PROCEDURE — G8427 DOCREV CUR MEDS BY ELIG CLIN: HCPCS | Performed by: INTERNAL MEDICINE

## 2019-10-02 PROCEDURE — G8417 CALC BMI ABV UP PARAM F/U: HCPCS | Performed by: INTERNAL MEDICINE

## 2019-10-02 PROCEDURE — G8482 FLU IMMUNIZE ORDER/ADMIN: HCPCS | Performed by: INTERNAL MEDICINE

## 2019-10-02 PROCEDURE — 36415 COLL VENOUS BLD VENIPUNCTURE: CPT

## 2019-10-02 PROCEDURE — 71046 X-RAY EXAM CHEST 2 VIEWS: CPT

## 2019-10-02 PROCEDURE — 80053 COMPREHEN METABOLIC PANEL: CPT

## 2019-10-02 PROCEDURE — 6360000002 HC RX W HCPCS: Performed by: INTERNAL MEDICINE

## 2019-10-02 RX ORDER — LANOLIN ALCOHOL/MO/W.PET/CERES
3 CREAM (GRAM) TOPICAL NIGHTLY PRN
Status: DISCONTINUED | OUTPATIENT
Start: 2019-10-02 | End: 2019-10-08 | Stop reason: HOSPADM

## 2019-10-02 RX ORDER — ONDANSETRON 2 MG/ML
4 INJECTION INTRAMUSCULAR; INTRAVENOUS EVERY 6 HOURS PRN
Status: DISCONTINUED | OUTPATIENT
Start: 2019-10-02 | End: 2019-10-08 | Stop reason: HOSPADM

## 2019-10-02 RX ORDER — ASPIRIN 81 MG/1
81 TABLET, CHEWABLE ORAL DAILY
Status: DISCONTINUED | OUTPATIENT
Start: 2019-10-02 | End: 2019-10-08 | Stop reason: HOSPADM

## 2019-10-02 RX ORDER — PANTOPRAZOLE SODIUM 40 MG/1
40 TABLET, DELAYED RELEASE ORAL
Status: DISCONTINUED | OUTPATIENT
Start: 2019-10-03 | End: 2019-10-08 | Stop reason: HOSPADM

## 2019-10-02 RX ORDER — ISOSORBIDE MONONITRATE 60 MG/1
60 TABLET, EXTENDED RELEASE ORAL DAILY
Status: DISCONTINUED | OUTPATIENT
Start: 2019-10-02 | End: 2019-10-08 | Stop reason: HOSPADM

## 2019-10-02 RX ORDER — ACETAMINOPHEN 325 MG/1
650 TABLET ORAL EVERY 4 HOURS PRN
Status: DISCONTINUED | OUTPATIENT
Start: 2019-10-02 | End: 2019-10-02

## 2019-10-02 RX ORDER — PREGABALIN 75 MG/1
75 CAPSULE ORAL 2 TIMES DAILY
Status: DISCONTINUED | OUTPATIENT
Start: 2019-10-02 | End: 2019-10-08 | Stop reason: HOSPADM

## 2019-10-02 RX ORDER — SODIUM CHLORIDE 0.9 % (FLUSH) 0.9 %
10 SYRINGE (ML) INJECTION PRN
Status: DISCONTINUED | OUTPATIENT
Start: 2019-10-02 | End: 2019-10-07

## 2019-10-02 RX ORDER — ATORVASTATIN CALCIUM 40 MG/1
40 TABLET, FILM COATED ORAL NIGHTLY
Status: DISCONTINUED | OUTPATIENT
Start: 2019-10-02 | End: 2019-10-08 | Stop reason: HOSPADM

## 2019-10-02 RX ORDER — SODIUM CHLORIDE 0.9 % (FLUSH) 0.9 %
10 SYRINGE (ML) INJECTION EVERY 12 HOURS SCHEDULED
Status: DISCONTINUED | OUTPATIENT
Start: 2019-10-02 | End: 2019-10-07

## 2019-10-02 RX ORDER — DEXTROSE MONOHYDRATE 50 MG/ML
100 INJECTION, SOLUTION INTRAVENOUS PRN
Status: DISCONTINUED | OUTPATIENT
Start: 2019-10-02 | End: 2019-10-08 | Stop reason: HOSPADM

## 2019-10-02 RX ORDER — TRAZODONE HYDROCHLORIDE 50 MG/1
50 TABLET ORAL NIGHTLY PRN
Status: DISCONTINUED | OUTPATIENT
Start: 2019-10-02 | End: 2019-10-08 | Stop reason: HOSPADM

## 2019-10-02 RX ORDER — ACETAMINOPHEN 500 MG
500 TABLET ORAL EVERY 6 HOURS PRN
Status: DISCONTINUED | OUTPATIENT
Start: 2019-10-02 | End: 2019-10-08 | Stop reason: HOSPADM

## 2019-10-02 RX ORDER — CARVEDILOL 6.25 MG/1
6.25 TABLET ORAL 2 TIMES DAILY
Status: DISCONTINUED | OUTPATIENT
Start: 2019-10-02 | End: 2019-10-08 | Stop reason: HOSPADM

## 2019-10-02 RX ORDER — ARIPIPRAZOLE 5 MG/1
5 TABLET ORAL DAILY
Status: DISCONTINUED | OUTPATIENT
Start: 2019-10-02 | End: 2019-10-08 | Stop reason: HOSPADM

## 2019-10-02 RX ORDER — NICOTINE POLACRILEX 4 MG
15 LOZENGE BUCCAL PRN
Status: DISCONTINUED | OUTPATIENT
Start: 2019-10-02 | End: 2019-10-08 | Stop reason: HOSPADM

## 2019-10-02 RX ORDER — NITROGLYCERIN 0.4 MG/1
0.4 TABLET SUBLINGUAL EVERY 5 MIN PRN
Status: DISCONTINUED | OUTPATIENT
Start: 2019-10-02 | End: 2019-10-08 | Stop reason: HOSPADM

## 2019-10-02 RX ORDER — FUROSEMIDE 10 MG/ML
40 INJECTION INTRAMUSCULAR; INTRAVENOUS 2 TIMES DAILY
Status: DISCONTINUED | OUTPATIENT
Start: 2019-10-02 | End: 2019-10-04

## 2019-10-02 RX ORDER — DEXTROSE MONOHYDRATE 25 G/50ML
12.5 INJECTION, SOLUTION INTRAVENOUS PRN
Status: DISCONTINUED | OUTPATIENT
Start: 2019-10-02 | End: 2019-10-08 | Stop reason: HOSPADM

## 2019-10-02 RX ORDER — CLOPIDOGREL BISULFATE 75 MG/1
75 TABLET ORAL DAILY
Status: DISCONTINUED | OUTPATIENT
Start: 2019-10-02 | End: 2019-10-08 | Stop reason: HOSPADM

## 2019-10-02 RX ADMIN — FUROSEMIDE 40 MG: 10 INJECTION, SOLUTION INTRAMUSCULAR; INTRAVENOUS at 13:32

## 2019-10-02 RX ADMIN — TRAZODONE HYDROCHLORIDE 50 MG: 50 TABLET ORAL at 23:42

## 2019-10-02 RX ADMIN — ATORVASTATIN CALCIUM 40 MG: 40 TABLET, FILM COATED ORAL at 20:23

## 2019-10-02 RX ADMIN — ENOXAPARIN SODIUM 30 MG: 30 INJECTION SUBCUTANEOUS at 20:22

## 2019-10-02 RX ADMIN — PREGABALIN 75 MG: 75 CAPSULE ORAL at 20:22

## 2019-10-02 RX ADMIN — Medication 10 ML: at 20:23

## 2019-10-02 RX ADMIN — INSULIN LISPRO 1 UNITS: 100 INJECTION, SOLUTION INTRAVENOUS; SUBCUTANEOUS at 16:36

## 2019-10-02 RX ADMIN — MICONAZOLE NITRATE: 20 POWDER TOPICAL at 20:22

## 2019-10-02 RX ADMIN — CARVEDILOL 6.25 MG: 6.25 TABLET, FILM COATED ORAL at 20:23

## 2019-10-02 RX ADMIN — FUROSEMIDE 40 MG: 10 INJECTION, SOLUTION INTRAMUSCULAR; INTRAVENOUS at 20:22

## 2019-10-02 RX ADMIN — MELATONIN 3 MG: at 23:42

## 2019-10-02 RX ADMIN — ACETAMINOPHEN 500 MG: 500 TABLET ORAL at 15:36

## 2019-10-02 ASSESSMENT — PAIN SCALES - GENERAL: PAINLEVEL_OUTOF10: 5

## 2019-10-03 LAB
ANION GAP SERPL CALCULATED.3IONS-SCNC: 15 MEQ/L (ref 9–15)
BACTERIA: NEGATIVE /HPF
BILIRUBIN URINE: NEGATIVE
BLOOD, URINE: ABNORMAL
BUN BLDV-MCNC: 42 MG/DL (ref 8–23)
CALCIUM SERPL-MCNC: 9.9 MG/DL (ref 8.5–9.9)
CHLORIDE BLD-SCNC: 99 MEQ/L (ref 95–107)
CLARITY: CLEAR
CO2: 23 MEQ/L (ref 20–31)
COLOR: YELLOW
CREAT SERPL-MCNC: 2.67 MG/DL (ref 0.5–0.9)
CREATININE URINE: 40.1 MG/DL
EPITHELIAL CELLS, UA: ABNORMAL /HPF (ref 0–5)
GFR AFRICAN AMERICAN: 21.9
GFR NON-AFRICAN AMERICAN: 18.1
GLUCOSE BLD-MCNC: 179 MG/DL (ref 60–115)
GLUCOSE BLD-MCNC: 240 MG/DL (ref 70–99)
GLUCOSE BLD-MCNC: 242 MG/DL (ref 60–115)
GLUCOSE BLD-MCNC: 288 MG/DL (ref 60–115)
GLUCOSE BLD-MCNC: 359 MG/DL (ref 60–115)
GLUCOSE URINE: 250 MG/DL
HYALINE CASTS: ABNORMAL /HPF (ref 0–5)
KETONES, URINE: NEGATIVE MG/DL
LEUKOCYTE ESTERASE, URINE: ABNORMAL
NITRITE, URINE: NEGATIVE
PERFORMED ON: ABNORMAL
PH UA: 5.5 (ref 5–9)
POTASSIUM SERPL-SCNC: 4.2 MEQ/L (ref 3.4–4.9)
PRO-BNP: 2236 PG/ML
PROTEIN PROTEIN: 137 MG/DL
PROTEIN UA: >=300 MG/DL
PROTEIN/CREAT RATIO: 3.4 ML/ML
PROTEIN/CREAT RATIO: 3.4 ML/ML (ref 0–0.2)
RBC UA: ABNORMAL /HPF (ref 0–5)
SODIUM BLD-SCNC: 137 MEQ/L (ref 135–144)
SPECIFIC GRAVITY UA: 1.01 (ref 1–1.03)
UROBILINOGEN, URINE: 0.2 E.U./DL
WBC UA: ABNORMAL /HPF (ref 0–5)

## 2019-10-03 PROCEDURE — 83880 ASSAY OF NATRIURETIC PEPTIDE: CPT

## 2019-10-03 PROCEDURE — 81001 URINALYSIS AUTO W/SCOPE: CPT

## 2019-10-03 PROCEDURE — 51702 INSERT TEMP BLADDER CATH: CPT

## 2019-10-03 PROCEDURE — 99223 1ST HOSP IP/OBS HIGH 75: CPT | Performed by: INTERNAL MEDICINE

## 2019-10-03 PROCEDURE — 36415 COLL VENOUS BLD VENIPUNCTURE: CPT

## 2019-10-03 PROCEDURE — 84156 ASSAY OF PROTEIN URINE: CPT

## 2019-10-03 PROCEDURE — G0008 ADMIN INFLUENZA VIRUS VAC: HCPCS | Performed by: PHYSICIAN ASSISTANT

## 2019-10-03 PROCEDURE — 80048 BASIC METABOLIC PNL TOTAL CA: CPT

## 2019-10-03 PROCEDURE — G0378 HOSPITAL OBSERVATION PER HR: HCPCS

## 2019-10-03 PROCEDURE — 6370000000 HC RX 637 (ALT 250 FOR IP): Performed by: INTERNAL MEDICINE

## 2019-10-03 PROCEDURE — 99222 1ST HOSP IP/OBS MODERATE 55: CPT | Performed by: INTERNAL MEDICINE

## 2019-10-03 PROCEDURE — 6360000002 HC RX W HCPCS: Performed by: INTERNAL MEDICINE

## 2019-10-03 PROCEDURE — 90686 IIV4 VACC NO PRSV 0.5 ML IM: CPT | Performed by: PHYSICIAN ASSISTANT

## 2019-10-03 PROCEDURE — 6360000002 HC RX W HCPCS: Performed by: PHYSICIAN ASSISTANT

## 2019-10-03 PROCEDURE — 99220 PR INITIAL OBSERVATION CARE/DAY 70 MINUTES: CPT | Performed by: INTERNAL MEDICINE

## 2019-10-03 PROCEDURE — 96372 THER/PROPH/DIAG INJ SC/IM: CPT

## 2019-10-03 PROCEDURE — 2580000003 HC RX 258: Performed by: INTERNAL MEDICINE

## 2019-10-03 PROCEDURE — 97165 OT EVAL LOW COMPLEX 30 MIN: CPT

## 2019-10-03 PROCEDURE — 97161 PT EVAL LOW COMPLEX 20 MIN: CPT

## 2019-10-03 PROCEDURE — 96376 TX/PRO/DX INJ SAME DRUG ADON: CPT

## 2019-10-03 RX ORDER — INSULIN GLARGINE 100 [IU]/ML
40 INJECTION, SOLUTION SUBCUTANEOUS NIGHTLY
Status: DISCONTINUED | OUTPATIENT
Start: 2019-10-03 | End: 2019-10-04

## 2019-10-03 RX ADMIN — INSULIN LISPRO 5 UNITS: 100 INJECTION, SOLUTION INTRAVENOUS; SUBCUTANEOUS at 11:21

## 2019-10-03 RX ADMIN — FUROSEMIDE 40 MG: 10 INJECTION, SOLUTION INTRAMUSCULAR; INTRAVENOUS at 08:19

## 2019-10-03 RX ADMIN — INSULIN GLARGINE 40 UNITS: 100 INJECTION, SOLUTION SUBCUTANEOUS at 21:31

## 2019-10-03 RX ADMIN — LINAGLIPTIN 5 MG: 5 TABLET, FILM COATED ORAL at 08:20

## 2019-10-03 RX ADMIN — CARVEDILOL 6.25 MG: 6.25 TABLET, FILM COATED ORAL at 08:20

## 2019-10-03 RX ADMIN — CLOPIDOGREL BISULFATE 75 MG: 75 TABLET ORAL at 08:20

## 2019-10-03 RX ADMIN — ENOXAPARIN SODIUM 30 MG: 30 INJECTION SUBCUTANEOUS at 21:02

## 2019-10-03 RX ADMIN — PREGABALIN 75 MG: 75 CAPSULE ORAL at 21:02

## 2019-10-03 RX ADMIN — Medication 10 ML: at 08:19

## 2019-10-03 RX ADMIN — MICONAZOLE NITRATE: 20 POWDER TOPICAL at 21:30

## 2019-10-03 RX ADMIN — MAGNESIUM OXIDE TAB 400 MG (241.3 MG ELEMENTAL MG) 400 MG: 400 (241.3 MG) TAB at 08:20

## 2019-10-03 RX ADMIN — PANTOPRAZOLE SODIUM 40 MG: 40 TABLET, DELAYED RELEASE ORAL at 05:34

## 2019-10-03 RX ADMIN — CARVEDILOL 6.25 MG: 6.25 TABLET, FILM COATED ORAL at 21:03

## 2019-10-03 RX ADMIN — INSULIN LISPRO 3 UNITS: 100 INJECTION, SOLUTION INTRAVENOUS; SUBCUTANEOUS at 16:30

## 2019-10-03 RX ADMIN — ATORVASTATIN CALCIUM 40 MG: 40 TABLET, FILM COATED ORAL at 21:03

## 2019-10-03 RX ADMIN — INFLUENZA A VIRUS A/BRISBANE/02/2018 IVR-190 (H1N1) ANTIGEN (PROPIOLACTONE INACTIVATED), INFLUENZA A VIRUS A/KANSAS/14/2017 X-327 (H3N2) ANTIGEN (PROPIOLACTONE INACTIVATED), INFLUENZA B VIRUS B/MARYLAND/15/2016 ANTIGEN (PROPIOLACTONE INACTIVATED), INFLUENZA B VIRUS B/PHUKET/3073/2013 BVR-1B ANTIGEN (PROPIOLACTONE INACTIVATED) 0.5 ML: 15; 15; 15; 15 INJECTION, SUSPENSION INTRAMUSCULAR at 21:19

## 2019-10-03 RX ADMIN — MICONAZOLE NITRATE: 20 POWDER TOPICAL at 08:23

## 2019-10-03 RX ADMIN — FUROSEMIDE 40 MG: 10 INJECTION, SOLUTION INTRAMUSCULAR; INTRAVENOUS at 15:58

## 2019-10-03 RX ADMIN — TRAZODONE HYDROCHLORIDE 50 MG: 50 TABLET ORAL at 21:30

## 2019-10-03 RX ADMIN — PREGABALIN 75 MG: 75 CAPSULE ORAL at 08:19

## 2019-10-03 RX ADMIN — Medication 10 ML: at 21:03

## 2019-10-03 RX ADMIN — SERTRALINE HYDROCHLORIDE 50 MG: 50 TABLET ORAL at 08:21

## 2019-10-03 RX ADMIN — ARIPIPRAZOLE 5 MG: 5 TABLET ORAL at 08:20

## 2019-10-03 RX ADMIN — MELATONIN 3 MG: at 21:30

## 2019-10-03 RX ADMIN — INSULIN LISPRO 2 UNITS: 100 INJECTION, SOLUTION INTRAVENOUS; SUBCUTANEOUS at 08:24

## 2019-10-03 RX ADMIN — ISOSORBIDE MONONITRATE 60 MG: 60 TABLET, EXTENDED RELEASE ORAL at 08:20

## 2019-10-03 RX ADMIN — ASPIRIN 81 MG 81 MG: 81 TABLET ORAL at 08:20

## 2019-10-03 ASSESSMENT — ENCOUNTER SYMPTOMS
BACK PAIN: 0
EYE DISCHARGE: 0
ABDOMINAL DISTENTION: 0
ABDOMINAL PAIN: 0
CHEST TIGHTNESS: 0
APNEA: 0
COLOR CHANGE: 0
CHOKING: 0
SHORTNESS OF BREATH: 1

## 2019-10-03 ASSESSMENT — PAIN SCALES - GENERAL
PAINLEVEL_OUTOF10: 0

## 2019-10-04 ENCOUNTER — TELEPHONE (OUTPATIENT)
Dept: FAMILY MEDICINE CLINIC | Age: 59
End: 2019-10-04

## 2019-10-04 PROBLEM — I50.30 DIASTOLIC CHF (HCC): Status: ACTIVE | Noted: 2019-10-04

## 2019-10-04 LAB
ANION GAP SERPL CALCULATED.3IONS-SCNC: 16 MEQ/L (ref 9–15)
BASOPHILS ABSOLUTE: 0.1 K/UL (ref 0–0.2)
BASOPHILS RELATIVE PERCENT: 0.6 %
BUN BLDV-MCNC: 53 MG/DL (ref 8–23)
CALCIUM SERPL-MCNC: 9.7 MG/DL (ref 8.5–9.9)
CHLORIDE BLD-SCNC: 99 MEQ/L (ref 95–107)
CO2: 22 MEQ/L (ref 20–31)
CREAT SERPL-MCNC: 3.4 MG/DL (ref 0.5–0.9)
EOSINOPHILS ABSOLUTE: 0.2 K/UL (ref 0–0.7)
EOSINOPHILS RELATIVE PERCENT: 2.4 %
FERRITIN: 159.9 NG/ML (ref 13–150)
GFR AFRICAN AMERICAN: 16.6
GFR NON-AFRICAN AMERICAN: 13.7
GLUCOSE BLD-MCNC: 157 MG/DL (ref 60–115)
GLUCOSE BLD-MCNC: 205 MG/DL (ref 70–99)
GLUCOSE BLD-MCNC: 212 MG/DL (ref 60–115)
GLUCOSE BLD-MCNC: 264 MG/DL (ref 60–115)
GLUCOSE BLD-MCNC: 308 MG/DL (ref 60–115)
HCT VFR BLD CALC: 29.9 % (ref 37–47)
HEMOGLOBIN: 10.2 G/DL (ref 12–16)
IRON SATURATION: 27 % (ref 11–46)
IRON: 58 UG/DL (ref 37–145)
LYMPHOCYTES ABSOLUTE: 2.7 K/UL (ref 1–4.8)
LYMPHOCYTES RELATIVE PERCENT: 26.7 %
MCH RBC QN AUTO: 28.5 PG (ref 27–31.3)
MCHC RBC AUTO-ENTMCNC: 34.1 % (ref 33–37)
MCV RBC AUTO: 83.5 FL (ref 82–100)
MONOCYTES ABSOLUTE: 0.7 K/UL (ref 0.2–0.8)
MONOCYTES RELATIVE PERCENT: 7.5 %
NEUTROPHILS ABSOLUTE: 6.3 K/UL (ref 1.4–6.5)
NEUTROPHILS RELATIVE PERCENT: 62.8 %
PDW BLD-RTO: 14.4 % (ref 11.5–14.5)
PERFORMED ON: ABNORMAL
PLATELET # BLD: 169 K/UL (ref 130–400)
POTASSIUM SERPL-SCNC: 3.8 MEQ/L (ref 3.4–4.9)
RBC # BLD: 3.58 M/UL (ref 4.2–5.4)
SODIUM BLD-SCNC: 137 MEQ/L (ref 135–144)
TOTAL IRON BINDING CAPACITY: 213 UG/DL (ref 178–450)
WBC # BLD: 9.9 K/UL (ref 4.8–10.8)

## 2019-10-04 PROCEDURE — 83540 ASSAY OF IRON: CPT

## 2019-10-04 PROCEDURE — 80048 BASIC METABOLIC PNL TOTAL CA: CPT

## 2019-10-04 PROCEDURE — 99232 SBSQ HOSP IP/OBS MODERATE 35: CPT | Performed by: INTERNAL MEDICINE

## 2019-10-04 PROCEDURE — 2060000000 HC ICU INTERMEDIATE R&B

## 2019-10-04 PROCEDURE — 83550 IRON BINDING TEST: CPT

## 2019-10-04 PROCEDURE — 2580000003 HC RX 258: Performed by: INTERNAL MEDICINE

## 2019-10-04 PROCEDURE — 99232 SBSQ HOSP IP/OBS MODERATE 35: CPT | Performed by: PHYSICIAN ASSISTANT

## 2019-10-04 PROCEDURE — 36415 COLL VENOUS BLD VENIPUNCTURE: CPT

## 2019-10-04 PROCEDURE — 6370000000 HC RX 637 (ALT 250 FOR IP): Performed by: PHYSICIAN ASSISTANT

## 2019-10-04 PROCEDURE — 97116 GAIT TRAINING THERAPY: CPT

## 2019-10-04 PROCEDURE — 85025 COMPLETE CBC W/AUTO DIFF WBC: CPT

## 2019-10-04 PROCEDURE — 6370000000 HC RX 637 (ALT 250 FOR IP): Performed by: INTERNAL MEDICINE

## 2019-10-04 PROCEDURE — 6360000002 HC RX W HCPCS: Performed by: INTERNAL MEDICINE

## 2019-10-04 PROCEDURE — 82728 ASSAY OF FERRITIN: CPT

## 2019-10-04 RX ORDER — INSULIN GLARGINE 100 [IU]/ML
50 INJECTION, SOLUTION SUBCUTANEOUS NIGHTLY
Status: DISCONTINUED | OUTPATIENT
Start: 2019-10-04 | End: 2019-10-07

## 2019-10-04 RX ADMIN — MICONAZOLE NITRATE: 20 POWDER TOPICAL at 22:57

## 2019-10-04 RX ADMIN — INSULIN LISPRO 2 UNITS: 100 INJECTION, SOLUTION INTRAVENOUS; SUBCUTANEOUS at 15:57

## 2019-10-04 RX ADMIN — ASPIRIN 81 MG 81 MG: 81 TABLET ORAL at 08:48

## 2019-10-04 RX ADMIN — SERTRALINE HYDROCHLORIDE 50 MG: 50 TABLET ORAL at 08:48

## 2019-10-04 RX ADMIN — CLOPIDOGREL BISULFATE 75 MG: 75 TABLET ORAL at 08:48

## 2019-10-04 RX ADMIN — CARVEDILOL 6.25 MG: 6.25 TABLET, FILM COATED ORAL at 22:18

## 2019-10-04 RX ADMIN — FUROSEMIDE 40 MG: 10 INJECTION, SOLUTION INTRAMUSCULAR; INTRAVENOUS at 08:48

## 2019-10-04 RX ADMIN — ISOSORBIDE MONONITRATE 60 MG: 60 TABLET, EXTENDED RELEASE ORAL at 08:48

## 2019-10-04 RX ADMIN — PANTOPRAZOLE SODIUM 40 MG: 40 TABLET, DELAYED RELEASE ORAL at 05:57

## 2019-10-04 RX ADMIN — INSULIN GLARGINE 50 UNITS: 100 INJECTION, SOLUTION SUBCUTANEOUS at 22:21

## 2019-10-04 RX ADMIN — CARVEDILOL 6.25 MG: 6.25 TABLET, FILM COATED ORAL at 08:48

## 2019-10-04 RX ADMIN — MAGNESIUM OXIDE TAB 400 MG (241.3 MG ELEMENTAL MG) 400 MG: 400 (241.3 MG) TAB at 08:48

## 2019-10-04 RX ADMIN — ARIPIPRAZOLE 5 MG: 5 TABLET ORAL at 08:47

## 2019-10-04 RX ADMIN — PREGABALIN 75 MG: 75 CAPSULE ORAL at 22:18

## 2019-10-04 RX ADMIN — ENOXAPARIN SODIUM 30 MG: 30 INJECTION SUBCUTANEOUS at 22:19

## 2019-10-04 RX ADMIN — INSULIN LISPRO 2 UNITS: 100 INJECTION, SOLUTION INTRAVENOUS; SUBCUTANEOUS at 08:49

## 2019-10-04 RX ADMIN — MICONAZOLE NITRATE: 20 POWDER TOPICAL at 08:47

## 2019-10-04 RX ADMIN — PREGABALIN 75 MG: 75 CAPSULE ORAL at 08:47

## 2019-10-04 RX ADMIN — ATORVASTATIN CALCIUM 40 MG: 40 TABLET, FILM COATED ORAL at 22:18

## 2019-10-04 RX ADMIN — INSULIN LISPRO 4 UNITS: 100 INJECTION, SOLUTION INTRAVENOUS; SUBCUTANEOUS at 12:27

## 2019-10-04 RX ADMIN — Medication 10 ML: at 08:48

## 2019-10-04 ASSESSMENT — PAIN SCALES - GENERAL
PAINLEVEL_OUTOF10: 0

## 2019-10-05 ENCOUNTER — APPOINTMENT (OUTPATIENT)
Dept: GENERAL RADIOLOGY | Age: 59
DRG: 250 | End: 2019-10-05
Attending: INTERNAL MEDICINE
Payer: MEDICARE

## 2019-10-05 LAB
ANION GAP SERPL CALCULATED.3IONS-SCNC: 14 MEQ/L (ref 9–15)
BUN BLDV-MCNC: 46 MG/DL (ref 8–23)
CALCIUM SERPL-MCNC: 9.6 MG/DL (ref 8.5–9.9)
CHLORIDE BLD-SCNC: 94 MEQ/L (ref 95–107)
CO2: 23 MEQ/L (ref 20–31)
CREAT SERPL-MCNC: 2.77 MG/DL (ref 0.5–0.9)
GFR AFRICAN AMERICAN: 21
GFR NON-AFRICAN AMERICAN: 17.4
GLUCOSE BLD-MCNC: 132 MG/DL (ref 60–115)
GLUCOSE BLD-MCNC: 192 MG/DL (ref 70–99)
GLUCOSE BLD-MCNC: 205 MG/DL (ref 60–115)
GLUCOSE BLD-MCNC: 263 MG/DL (ref 60–115)
GLUCOSE BLD-MCNC: 342 MG/DL (ref 60–115)
PERFORMED ON: ABNORMAL
POTASSIUM REFLEX MAGNESIUM: 3.8 MEQ/L (ref 3.4–4.9)
SODIUM BLD-SCNC: 131 MEQ/L (ref 135–144)

## 2019-10-05 PROCEDURE — 2580000003 HC RX 258: Performed by: INTERNAL MEDICINE

## 2019-10-05 PROCEDURE — 99232 SBSQ HOSP IP/OBS MODERATE 35: CPT | Performed by: PHYSICIAN ASSISTANT

## 2019-10-05 PROCEDURE — 6370000000 HC RX 637 (ALT 250 FOR IP): Performed by: INTERNAL MEDICINE

## 2019-10-05 PROCEDURE — 51702 INSERT TEMP BLADDER CATH: CPT

## 2019-10-05 PROCEDURE — 6370000000 HC RX 637 (ALT 250 FOR IP): Performed by: PHYSICIAN ASSISTANT

## 2019-10-05 PROCEDURE — 6360000002 HC RX W HCPCS: Performed by: INTERNAL MEDICINE

## 2019-10-05 PROCEDURE — 2060000000 HC ICU INTERMEDIATE R&B

## 2019-10-05 PROCEDURE — 71046 X-RAY EXAM CHEST 2 VIEWS: CPT

## 2019-10-05 PROCEDURE — 80048 BASIC METABOLIC PNL TOTAL CA: CPT

## 2019-10-05 PROCEDURE — 36415 COLL VENOUS BLD VENIPUNCTURE: CPT

## 2019-10-05 RX ADMIN — ATORVASTATIN CALCIUM 40 MG: 40 TABLET, FILM COATED ORAL at 21:10

## 2019-10-05 RX ADMIN — ISOSORBIDE MONONITRATE 60 MG: 60 TABLET, EXTENDED RELEASE ORAL at 09:11

## 2019-10-05 RX ADMIN — MAGNESIUM OXIDE TAB 400 MG (241.3 MG ELEMENTAL MG) 400 MG: 400 (241.3 MG) TAB at 09:11

## 2019-10-05 RX ADMIN — INSULIN LISPRO 4 UNITS: 100 INJECTION, SOLUTION INTRAVENOUS; SUBCUTANEOUS at 12:10

## 2019-10-05 RX ADMIN — ASPIRIN 81 MG 81 MG: 81 TABLET ORAL at 09:11

## 2019-10-05 RX ADMIN — Medication 10 ML: at 09:00

## 2019-10-05 RX ADMIN — Medication 10 ML: at 21:10

## 2019-10-05 RX ADMIN — INSULIN GLARGINE 50 UNITS: 100 INJECTION, SOLUTION SUBCUTANEOUS at 21:12

## 2019-10-05 RX ADMIN — CARVEDILOL 6.25 MG: 6.25 TABLET, FILM COATED ORAL at 21:10

## 2019-10-05 RX ADMIN — CLOPIDOGREL BISULFATE 75 MG: 75 TABLET ORAL at 09:11

## 2019-10-05 RX ADMIN — CARVEDILOL 6.25 MG: 6.25 TABLET, FILM COATED ORAL at 09:11

## 2019-10-05 RX ADMIN — SERTRALINE HYDROCHLORIDE 50 MG: 50 TABLET ORAL at 09:11

## 2019-10-05 RX ADMIN — TRAZODONE HYDROCHLORIDE 50 MG: 50 TABLET ORAL at 21:10

## 2019-10-05 RX ADMIN — MICONAZOLE NITRATE: 20 POWDER TOPICAL at 09:12

## 2019-10-05 RX ADMIN — PREGABALIN 75 MG: 75 CAPSULE ORAL at 09:11

## 2019-10-05 RX ADMIN — INSULIN LISPRO 2 UNITS: 100 INJECTION, SOLUTION INTRAVENOUS; SUBCUTANEOUS at 16:46

## 2019-10-05 RX ADMIN — PREGABALIN 75 MG: 75 CAPSULE ORAL at 21:10

## 2019-10-05 RX ADMIN — PANTOPRAZOLE SODIUM 40 MG: 40 TABLET, DELAYED RELEASE ORAL at 06:39

## 2019-10-05 RX ADMIN — ENOXAPARIN SODIUM 30 MG: 30 INJECTION SUBCUTANEOUS at 21:11

## 2019-10-05 RX ADMIN — ARIPIPRAZOLE 5 MG: 5 TABLET ORAL at 09:11

## 2019-10-05 RX ADMIN — MICONAZOLE NITRATE: 20 POWDER TOPICAL at 21:20

## 2019-10-05 ASSESSMENT — PAIN SCALES - GENERAL
PAINLEVEL_OUTOF10: 0

## 2019-10-06 LAB
ANION GAP SERPL CALCULATED.3IONS-SCNC: 13 MEQ/L (ref 9–15)
BUN BLDV-MCNC: 44 MG/DL (ref 8–23)
CALCIUM SERPL-MCNC: 9.6 MG/DL (ref 8.5–9.9)
CHLORIDE BLD-SCNC: 97 MEQ/L (ref 95–107)
CO2: 21 MEQ/L (ref 20–31)
CREAT SERPL-MCNC: 2.71 MG/DL (ref 0.5–0.9)
GFR AFRICAN AMERICAN: 21.5
GFR NON-AFRICAN AMERICAN: 17.8
GLUCOSE BLD-MCNC: 129 MG/DL (ref 60–115)
GLUCOSE BLD-MCNC: 227 MG/DL (ref 60–115)
GLUCOSE BLD-MCNC: 246 MG/DL (ref 60–115)
GLUCOSE BLD-MCNC: 294 MG/DL (ref 70–99)
GLUCOSE BLD-MCNC: 320 MG/DL (ref 60–115)
PERFORMED ON: ABNORMAL
POTASSIUM SERPL-SCNC: 4.3 MEQ/L (ref 3.4–4.9)
SODIUM BLD-SCNC: 131 MEQ/L (ref 135–144)

## 2019-10-06 PROCEDURE — 6370000000 HC RX 637 (ALT 250 FOR IP): Performed by: PHYSICIAN ASSISTANT

## 2019-10-06 PROCEDURE — 97116 GAIT TRAINING THERAPY: CPT

## 2019-10-06 PROCEDURE — 6370000000 HC RX 637 (ALT 250 FOR IP): Performed by: INTERNAL MEDICINE

## 2019-10-06 PROCEDURE — 2060000000 HC ICU INTERMEDIATE R&B

## 2019-10-06 PROCEDURE — 80048 BASIC METABOLIC PNL TOTAL CA: CPT

## 2019-10-06 PROCEDURE — 36415 COLL VENOUS BLD VENIPUNCTURE: CPT

## 2019-10-06 PROCEDURE — 51702 INSERT TEMP BLADDER CATH: CPT

## 2019-10-06 PROCEDURE — 2580000003 HC RX 258: Performed by: INTERNAL MEDICINE

## 2019-10-06 RX ORDER — SODIUM CHLORIDE 9 MG/ML
INJECTION, SOLUTION INTRAVENOUS CONTINUOUS
Status: DISCONTINUED | OUTPATIENT
Start: 2019-10-07 | End: 2019-10-07

## 2019-10-06 RX ADMIN — CARVEDILOL 6.25 MG: 6.25 TABLET, FILM COATED ORAL at 08:40

## 2019-10-06 RX ADMIN — MICONAZOLE NITRATE: 20 POWDER TOPICAL at 08:40

## 2019-10-06 RX ADMIN — Medication 10 ML: at 08:43

## 2019-10-06 RX ADMIN — INSULIN LISPRO 4 UNITS: 100 INJECTION, SOLUTION INTRAVENOUS; SUBCUTANEOUS at 12:04

## 2019-10-06 RX ADMIN — MAGNESIUM OXIDE TAB 400 MG (241.3 MG ELEMENTAL MG) 400 MG: 400 (241.3 MG) TAB at 08:41

## 2019-10-06 RX ADMIN — CARVEDILOL 6.25 MG: 6.25 TABLET, FILM COATED ORAL at 21:32

## 2019-10-06 RX ADMIN — CLOPIDOGREL BISULFATE 75 MG: 75 TABLET ORAL at 08:40

## 2019-10-06 RX ADMIN — ARIPIPRAZOLE 5 MG: 5 TABLET ORAL at 08:40

## 2019-10-06 RX ADMIN — INSULIN GLARGINE 20 UNITS: 100 INJECTION, SOLUTION SUBCUTANEOUS at 21:31

## 2019-10-06 RX ADMIN — INSULIN LISPRO 2 UNITS: 100 INJECTION, SOLUTION INTRAVENOUS; SUBCUTANEOUS at 16:42

## 2019-10-06 RX ADMIN — INSULIN LISPRO 2 UNITS: 100 INJECTION, SOLUTION INTRAVENOUS; SUBCUTANEOUS at 08:43

## 2019-10-06 RX ADMIN — Medication 10 ML: at 21:32

## 2019-10-06 RX ADMIN — SERTRALINE HYDROCHLORIDE 50 MG: 50 TABLET ORAL at 08:40

## 2019-10-06 RX ADMIN — ATORVASTATIN CALCIUM 40 MG: 40 TABLET, FILM COATED ORAL at 21:31

## 2019-10-06 RX ADMIN — ASPIRIN 81 MG 81 MG: 81 TABLET ORAL at 08:40

## 2019-10-06 RX ADMIN — PANTOPRAZOLE SODIUM 40 MG: 40 TABLET, DELAYED RELEASE ORAL at 06:06

## 2019-10-06 RX ADMIN — ISOSORBIDE MONONITRATE 60 MG: 60 TABLET, EXTENDED RELEASE ORAL at 08:40

## 2019-10-06 RX ADMIN — PREGABALIN 75 MG: 75 CAPSULE ORAL at 08:40

## 2019-10-06 RX ADMIN — MICONAZOLE NITRATE: 20 POWDER TOPICAL at 21:34

## 2019-10-06 RX ADMIN — PREGABALIN 75 MG: 75 CAPSULE ORAL at 21:32

## 2019-10-06 ASSESSMENT — ENCOUNTER SYMPTOMS
SHORTNESS OF BREATH: 1
SORE THROAT: 0
CHEST TIGHTNESS: 0
ABDOMINAL DISTENTION: 0
COUGH: 0
SINUS PRESSURE: 0
WHEEZING: 0
EYE DISCHARGE: 0
ABDOMINAL PAIN: 0
DIARRHEA: 0
BACK PAIN: 0
CHEST TIGHTNESS: 1
CONSTIPATION: 0
NAUSEA: 0
EYE REDNESS: 0
FACIAL SWELLING: 0

## 2019-10-06 ASSESSMENT — PAIN SCALES - GENERAL: PAINLEVEL_OUTOF10: 0

## 2019-10-07 ENCOUNTER — APPOINTMENT (OUTPATIENT)
Dept: CARDIAC CATH/INVASIVE PROCEDURES | Age: 59
DRG: 250 | End: 2019-10-07
Attending: INTERNAL MEDICINE
Payer: MEDICARE

## 2019-10-07 LAB
ANION GAP SERPL CALCULATED.3IONS-SCNC: 15 MEQ/L (ref 9–15)
BUN BLDV-MCNC: 50 MG/DL (ref 8–23)
CALCIUM SERPL-MCNC: 9.9 MG/DL (ref 8.5–9.9)
CHLORIDE BLD-SCNC: 103 MEQ/L (ref 95–107)
CO2: 20 MEQ/L (ref 20–31)
CREAT SERPL-MCNC: 2.56 MG/DL (ref 0.5–0.9)
GFR AFRICAN AMERICAN: 23
GFR NON-AFRICAN AMERICAN: 19
GLUCOSE BLD-MCNC: 147 MG/DL (ref 70–99)
GLUCOSE BLD-MCNC: 167 MG/DL (ref 60–115)
GLUCOSE BLD-MCNC: 270 MG/DL (ref 60–115)
GLUCOSE BLD-MCNC: 289 MG/DL (ref 60–115)
PERFORMED ON: ABNORMAL
POTASSIUM SERPL-SCNC: 4.1 MEQ/L (ref 3.4–4.9)
SODIUM BLD-SCNC: 138 MEQ/L (ref 135–144)

## 2019-10-07 PROCEDURE — 6370000000 HC RX 637 (ALT 250 FOR IP): Performed by: PHYSICIAN ASSISTANT

## 2019-10-07 PROCEDURE — 2580000003 HC RX 258

## 2019-10-07 PROCEDURE — C1725 CATH, TRANSLUMIN NON-LASER: HCPCS

## 2019-10-07 PROCEDURE — 92920 PRQ TRLUML C ANGIOP 1ART&/BR: CPT | Performed by: INTERNAL MEDICINE

## 2019-10-07 PROCEDURE — 2500000003 HC RX 250 WO HCPCS

## 2019-10-07 PROCEDURE — 85347 COAGULATION TIME ACTIVATED: CPT

## 2019-10-07 PROCEDURE — 82948 REAGENT STRIP/BLOOD GLUCOSE: CPT

## 2019-10-07 PROCEDURE — C1769 GUIDE WIRE: HCPCS

## 2019-10-07 PROCEDURE — B2111ZZ FLUOROSCOPY OF MULTIPLE CORONARY ARTERIES USING LOW OSMOLAR CONTRAST: ICD-10-PCS | Performed by: INTERNAL MEDICINE

## 2019-10-07 PROCEDURE — B2151ZZ FLUOROSCOPY OF LEFT HEART USING LOW OSMOLAR CONTRAST: ICD-10-PCS | Performed by: INTERNAL MEDICINE

## 2019-10-07 PROCEDURE — 99232 SBSQ HOSP IP/OBS MODERATE 35: CPT | Performed by: INTERNAL MEDICINE

## 2019-10-07 PROCEDURE — 2709999900 HC NON-CHARGEABLE SUPPLY

## 2019-10-07 PROCEDURE — 36415 COLL VENOUS BLD VENIPUNCTURE: CPT

## 2019-10-07 PROCEDURE — 4A023N7 MEASUREMENT OF CARDIAC SAMPLING AND PRESSURE, LEFT HEART, PERCUTANEOUS APPROACH: ICD-10-PCS | Performed by: INTERNAL MEDICINE

## 2019-10-07 PROCEDURE — 2060000000 HC ICU INTERMEDIATE R&B

## 2019-10-07 PROCEDURE — 93458 L HRT ARTERY/VENTRICLE ANGIO: CPT | Performed by: INTERNAL MEDICINE

## 2019-10-07 PROCEDURE — 02703ZZ DILATION OF CORONARY ARTERY, ONE ARTERY, PERCUTANEOUS APPROACH: ICD-10-PCS | Performed by: INTERNAL MEDICINE

## 2019-10-07 PROCEDURE — 6360000004 HC RX CONTRAST MEDICATION: Performed by: INTERNAL MEDICINE

## 2019-10-07 PROCEDURE — C1887 CATHETER, GUIDING: HCPCS

## 2019-10-07 PROCEDURE — 80048 BASIC METABOLIC PNL TOTAL CA: CPT

## 2019-10-07 PROCEDURE — 6370000000 HC RX 637 (ALT 250 FOR IP): Performed by: INTERNAL MEDICINE

## 2019-10-07 PROCEDURE — 6360000002 HC RX W HCPCS

## 2019-10-07 PROCEDURE — 2580000003 HC RX 258: Performed by: INTERNAL MEDICINE

## 2019-10-07 RX ORDER — INSULIN GLARGINE 100 [IU]/ML
60 INJECTION, SOLUTION SUBCUTANEOUS NIGHTLY
Status: DISCONTINUED | OUTPATIENT
Start: 2019-10-08 | End: 2019-10-08 | Stop reason: HOSPADM

## 2019-10-07 RX ORDER — SODIUM CHLORIDE 9 MG/ML
INJECTION, SOLUTION INTRAVENOUS CONTINUOUS
Status: DISPENSED | OUTPATIENT
Start: 2019-10-07 | End: 2019-10-08

## 2019-10-07 RX ADMIN — MICONAZOLE NITRATE: 20 POWDER TOPICAL at 20:18

## 2019-10-07 RX ADMIN — MAGNESIUM OXIDE TAB 400 MG (241.3 MG ELEMENTAL MG) 400 MG: 400 (241.3 MG) TAB at 17:46

## 2019-10-07 RX ADMIN — IOPAMIDOL 49 ML: 612 INJECTION, SOLUTION INTRAVENOUS at 11:54

## 2019-10-07 RX ADMIN — INSULIN LISPRO 3 UNITS: 100 INJECTION, SOLUTION INTRAVENOUS; SUBCUTANEOUS at 17:45

## 2019-10-07 RX ADMIN — SODIUM CHLORIDE: 9 INJECTION, SOLUTION INTRAVENOUS at 19:04

## 2019-10-07 RX ADMIN — ATORVASTATIN CALCIUM 40 MG: 40 TABLET, FILM COATED ORAL at 20:16

## 2019-10-07 RX ADMIN — PREGABALIN 75 MG: 75 CAPSULE ORAL at 20:16

## 2019-10-07 RX ADMIN — SERTRALINE HYDROCHLORIDE 50 MG: 50 TABLET ORAL at 17:48

## 2019-10-07 RX ADMIN — CARVEDILOL 6.25 MG: 6.25 TABLET, FILM COATED ORAL at 20:17

## 2019-10-07 RX ADMIN — PANTOPRAZOLE SODIUM 40 MG: 40 TABLET, DELAYED RELEASE ORAL at 06:25

## 2019-10-07 RX ADMIN — MELATONIN 3 MG: at 21:45

## 2019-10-07 RX ADMIN — SODIUM CHLORIDE: 9 INJECTION, SOLUTION INTRAVENOUS at 06:25

## 2019-10-07 RX ADMIN — Medication 10 ML: at 08:02

## 2019-10-07 RX ADMIN — TRAZODONE HYDROCHLORIDE 50 MG: 50 TABLET ORAL at 21:47

## 2019-10-07 RX ADMIN — INSULIN GLARGINE 50 UNITS: 100 INJECTION, SOLUTION SUBCUTANEOUS at 20:18

## 2019-10-07 RX ADMIN — ISOSORBIDE MONONITRATE 60 MG: 60 TABLET, EXTENDED RELEASE ORAL at 17:32

## 2019-10-07 ASSESSMENT — PAIN SCALES - GENERAL: PAINLEVEL_OUTOF10: 0

## 2019-10-08 VITALS
DIASTOLIC BLOOD PRESSURE: 59 MMHG | SYSTOLIC BLOOD PRESSURE: 131 MMHG | TEMPERATURE: 97.5 F | BODY MASS INDEX: 39.82 KG/M2 | HEART RATE: 61 BPM | RESPIRATION RATE: 18 BRPM | WEIGHT: 239 LBS | OXYGEN SATURATION: 99 % | HEIGHT: 65 IN

## 2019-10-08 LAB
ANION GAP SERPL CALCULATED.3IONS-SCNC: 13 MEQ/L (ref 9–15)
BACTERIA: ABNORMAL /HPF
BILIRUBIN URINE: NEGATIVE
BLOOD, URINE: NEGATIVE
BUN BLDV-MCNC: 46 MG/DL (ref 8–23)
CALCIUM SERPL-MCNC: 9.3 MG/DL (ref 8.5–9.9)
CHLORIDE BLD-SCNC: 110 MEQ/L (ref 95–107)
CLARITY: CLEAR
CO2: 18 MEQ/L (ref 20–31)
COLOR: YELLOW
CREAT SERPL-MCNC: 2.45 MG/DL (ref 0.5–0.9)
EPITHELIAL CELLS, UA: ABNORMAL /HPF (ref 0–5)
GFR AFRICAN AMERICAN: 24.2
GFR NON-AFRICAN AMERICAN: 20
GLUCOSE BLD-MCNC: 169 MG/DL (ref 60–115)
GLUCOSE BLD-MCNC: 226 MG/DL (ref 70–99)
GLUCOSE BLD-MCNC: 249 MG/DL (ref 60–115)
GLUCOSE BLD-MCNC: 303 MG/DL (ref 60–115)
GLUCOSE URINE: 250 MG/DL
HCT VFR BLD CALC: 29.6 % (ref 37–47)
HEMOGLOBIN: 10.3 G/DL (ref 12–16)
HYALINE CASTS: ABNORMAL /HPF (ref 0–5)
KETONES, URINE: NEGATIVE MG/DL
LEUKOCYTE ESTERASE, URINE: NEGATIVE
NITRITE, URINE: NEGATIVE
PERFORMED ON: ABNORMAL
PH UA: 5 (ref 5–9)
POC ACTIVATED CLOTTING TIME KAOLIN: 230 SEC (ref 82–152)
POC SAMPLE TYPE: ABNORMAL
POTASSIUM SERPL-SCNC: 4.8 MEQ/L (ref 3.4–4.9)
PROTEIN UA: >=300 MG/DL
RBC UA: ABNORMAL /HPF (ref 0–5)
SODIUM BLD-SCNC: 141 MEQ/L (ref 135–144)
SPECIFIC GRAVITY UA: 1.02 (ref 1–1.03)
UROBILINOGEN, URINE: 0.2 E.U./DL
WBC UA: ABNORMAL /HPF (ref 0–5)

## 2019-10-08 PROCEDURE — 99232 SBSQ HOSP IP/OBS MODERATE 35: CPT | Performed by: INTERNAL MEDICINE

## 2019-10-08 PROCEDURE — 81001 URINALYSIS AUTO W/SCOPE: CPT

## 2019-10-08 PROCEDURE — 96376 TX/PRO/DX INJ SAME DRUG ADON: CPT

## 2019-10-08 PROCEDURE — 85018 HEMOGLOBIN: CPT

## 2019-10-08 PROCEDURE — 97116 GAIT TRAINING THERAPY: CPT

## 2019-10-08 PROCEDURE — 36415 COLL VENOUS BLD VENIPUNCTURE: CPT

## 2019-10-08 PROCEDURE — 99232 SBSQ HOSP IP/OBS MODERATE 35: CPT | Performed by: PHYSICIAN ASSISTANT

## 2019-10-08 PROCEDURE — 85014 HEMATOCRIT: CPT

## 2019-10-08 PROCEDURE — 6370000000 HC RX 637 (ALT 250 FOR IP): Performed by: INTERNAL MEDICINE

## 2019-10-08 PROCEDURE — 80048 BASIC METABOLIC PNL TOTAL CA: CPT

## 2019-10-08 PROCEDURE — 96372 THER/PROPH/DIAG INJ SC/IM: CPT

## 2019-10-08 PROCEDURE — 96374 THER/PROPH/DIAG INJ IV PUSH: CPT

## 2019-10-08 RX ADMIN — INSULIN LISPRO 4 UNITS: 100 INJECTION, SOLUTION INTRAVENOUS; SUBCUTANEOUS at 11:54

## 2019-10-08 RX ADMIN — PREGABALIN 75 MG: 75 CAPSULE ORAL at 08:10

## 2019-10-08 RX ADMIN — PANTOPRAZOLE SODIUM 40 MG: 40 TABLET, DELAYED RELEASE ORAL at 05:59

## 2019-10-08 RX ADMIN — MICONAZOLE NITRATE: 20 POWDER TOPICAL at 08:12

## 2019-10-08 RX ADMIN — INSULIN LISPRO 1 UNITS: 100 INJECTION, SOLUTION INTRAVENOUS; SUBCUTANEOUS at 16:43

## 2019-10-08 RX ADMIN — INSULIN LISPRO 2 UNITS: 100 INJECTION, SOLUTION INTRAVENOUS; SUBCUTANEOUS at 08:13

## 2019-10-08 RX ADMIN — ASPIRIN 81 MG 81 MG: 81 TABLET ORAL at 08:10

## 2019-10-08 RX ADMIN — MAGNESIUM OXIDE TAB 400 MG (241.3 MG ELEMENTAL MG) 400 MG: 400 (241.3 MG) TAB at 08:10

## 2019-10-08 RX ADMIN — SERTRALINE HYDROCHLORIDE 50 MG: 50 TABLET ORAL at 08:10

## 2019-10-08 RX ADMIN — CLOPIDOGREL BISULFATE 75 MG: 75 TABLET ORAL at 08:11

## 2019-10-08 RX ADMIN — ISOSORBIDE MONONITRATE 60 MG: 60 TABLET, EXTENDED RELEASE ORAL at 08:10

## 2019-10-08 RX ADMIN — CARVEDILOL 6.25 MG: 6.25 TABLET, FILM COATED ORAL at 08:10

## 2019-10-08 RX ADMIN — ARIPIPRAZOLE 5 MG: 5 TABLET ORAL at 08:10

## 2019-10-08 ASSESSMENT — PAIN SCALES - GENERAL: PAINLEVEL_OUTOF10: 0

## 2019-10-09 ENCOUNTER — CARE COORDINATION (OUTPATIENT)
Dept: CASE MANAGEMENT | Age: 59
End: 2019-10-09

## 2019-10-10 ENCOUNTER — CARE COORDINATION (OUTPATIENT)
Dept: CASE MANAGEMENT | Age: 59
End: 2019-10-10

## 2019-10-11 ENCOUNTER — CARE COORDINATION (OUTPATIENT)
Dept: CASE MANAGEMENT | Age: 59
End: 2019-10-11

## 2019-10-11 DIAGNOSIS — I50.30 DIASTOLIC CONGESTIVE HEART FAILURE, UNSPECIFIED HF CHRONICITY (HCC): Primary | ICD-10-CM

## 2019-10-14 ENCOUNTER — CARE COORDINATION (OUTPATIENT)
Dept: CASE MANAGEMENT | Age: 59
End: 2019-10-14

## 2019-10-14 LAB
ANION GAP SERPL CALCULATED.3IONS-SCNC: 14 MEQ/L (ref 9–15)
BUN BLDV-MCNC: 30 MG/DL (ref 8–23)
CALCIUM SERPL-MCNC: 9.9 MG/DL (ref 8.5–9.9)
CHLORIDE BLD-SCNC: 105 MEQ/L (ref 95–107)
CO2: 20 MEQ/L (ref 20–31)
CREAT SERPL-MCNC: 2.47 MG/DL (ref 0.5–0.9)
GFR AFRICAN AMERICAN: 24
GFR NON-AFRICAN AMERICAN: 19.8
GLUCOSE BLD-MCNC: 173 MG/DL (ref 70–99)
POTASSIUM SERPL-SCNC: 4.8 MEQ/L (ref 3.4–4.9)
SODIUM BLD-SCNC: 139 MEQ/L (ref 135–144)

## 2019-10-15 ENCOUNTER — CARE COORDINATION (OUTPATIENT)
Dept: CASE MANAGEMENT | Age: 59
End: 2019-10-15

## 2019-10-16 ENCOUNTER — CARE COORDINATION (OUTPATIENT)
Dept: CASE MANAGEMENT | Age: 59
End: 2019-10-16

## 2019-10-16 ENCOUNTER — OFFICE VISIT (OUTPATIENT)
Dept: PALLATIVE CARE | Age: 59
End: 2019-10-16
Payer: MEDICARE

## 2019-10-16 VITALS
WEIGHT: 232.4 LBS | BODY MASS INDEX: 37.35 KG/M2 | TEMPERATURE: 98 F | OXYGEN SATURATION: 95 % | RESPIRATION RATE: 20 BRPM | SYSTOLIC BLOOD PRESSURE: 106 MMHG | HEIGHT: 66 IN | HEART RATE: 68 BPM | DIASTOLIC BLOOD PRESSURE: 53 MMHG

## 2019-10-16 DIAGNOSIS — I50.30 DIASTOLIC CONGESTIVE HEART FAILURE, UNSPECIFIED HF CHRONICITY (HCC): ICD-10-CM

## 2019-10-16 DIAGNOSIS — Z51.5 PALLIATIVE CARE PATIENT: ICD-10-CM

## 2019-10-16 DIAGNOSIS — N18.4 CKD (CHRONIC KIDNEY DISEASE) STAGE 4, GFR 15-29 ML/MIN (HCC): ICD-10-CM

## 2019-10-16 DIAGNOSIS — R06.02 SHORTNESS OF BREATH: Primary | ICD-10-CM

## 2019-10-16 PROCEDURE — G8427 DOCREV CUR MEDS BY ELIG CLIN: HCPCS | Performed by: NURSE PRACTITIONER

## 2019-10-16 PROCEDURE — 1036F TOBACCO NON-USER: CPT | Performed by: NURSE PRACTITIONER

## 2019-10-16 PROCEDURE — G8482 FLU IMMUNIZE ORDER/ADMIN: HCPCS | Performed by: NURSE PRACTITIONER

## 2019-10-16 PROCEDURE — G8598 ASA/ANTIPLAT THER USED: HCPCS | Performed by: NURSE PRACTITIONER

## 2019-10-16 PROCEDURE — 1111F DSCHRG MED/CURRENT MED MERGE: CPT | Performed by: NURSE PRACTITIONER

## 2019-10-16 PROCEDURE — G8417 CALC BMI ABV UP PARAM F/U: HCPCS | Performed by: NURSE PRACTITIONER

## 2019-10-16 PROCEDURE — 99205 OFFICE O/P NEW HI 60 MIN: CPT | Performed by: NURSE PRACTITIONER

## 2019-10-16 PROCEDURE — 3017F COLORECTAL CA SCREEN DOC REV: CPT | Performed by: NURSE PRACTITIONER

## 2019-10-16 ASSESSMENT — ENCOUNTER SYMPTOMS
TROUBLE SWALLOWING: 0
VOMITING: 0
SINUS PAIN: 0
NAUSEA: 0
WHEEZING: 0
CONSTIPATION: 0
SORE THROAT: 0
BACK PAIN: 0
DIARRHEA: 0
COUGH: 1
ABDOMINAL PAIN: 0
CHEST TIGHTNESS: 0
SHORTNESS OF BREATH: 1

## 2019-10-18 ENCOUNTER — CARE COORDINATION (OUTPATIENT)
Dept: CARE COORDINATION | Age: 59
End: 2019-10-18

## 2019-10-20 ENCOUNTER — TELEPHONE (OUTPATIENT)
Dept: OTHER | Facility: CLINIC | Age: 59
End: 2019-10-20

## 2019-10-20 ENCOUNTER — APPOINTMENT (OUTPATIENT)
Dept: CT IMAGING | Age: 59
End: 2019-10-20
Payer: MEDICARE

## 2019-10-20 ENCOUNTER — HOSPITAL ENCOUNTER (EMERGENCY)
Age: 59
Discharge: HOME | End: 2019-10-20
Attending: FAMILY MEDICINE
Payer: MEDICARE

## 2019-10-20 ENCOUNTER — APPOINTMENT (OUTPATIENT)
Dept: GENERAL RADIOLOGY | Age: 59
End: 2019-10-20
Payer: MEDICARE

## 2019-10-20 VITALS
HEIGHT: 67 IN | BODY MASS INDEX: 36.1 KG/M2 | SYSTOLIC BLOOD PRESSURE: 148 MMHG | RESPIRATION RATE: 18 BRPM | OXYGEN SATURATION: 97 % | HEART RATE: 68 BPM | TEMPERATURE: 96.2 F | WEIGHT: 230 LBS | DIASTOLIC BLOOD PRESSURE: 79 MMHG

## 2019-10-20 DIAGNOSIS — R29.898 WEAKNESS OF BOTH LOWER EXTREMITIES: ICD-10-CM

## 2019-10-20 DIAGNOSIS — R29.6 FREQUENT FALLS: Primary | ICD-10-CM

## 2019-10-20 LAB
ALBUMIN SERPL-MCNC: 3.7 G/DL (ref 3.5–4.6)
ALP BLD-CCNC: 107 U/L (ref 40–130)
ALT SERPL-CCNC: 16 U/L (ref 0–33)
ANION GAP SERPL CALCULATED.3IONS-SCNC: 14 MEQ/L (ref 9–15)
AST SERPL-CCNC: 23 U/L (ref 0–35)
BASOPHILS ABSOLUTE: 0.1 K/UL (ref 0–0.2)
BASOPHILS RELATIVE PERCENT: 0.6 %
BILIRUB SERPL-MCNC: 0.5 MG/DL (ref 0.2–0.7)
BUN BLDV-MCNC: 31 MG/DL (ref 8–23)
CALCIUM SERPL-MCNC: 10.3 MG/DL (ref 8.5–9.9)
CHLORIDE BLD-SCNC: 103 MEQ/L (ref 95–107)
CO2: 21 MEQ/L (ref 20–31)
CREAT SERPL-MCNC: 2.63 MG/DL (ref 0.5–0.9)
EKG ATRIAL RATE: 60 BPM
EKG P AXIS: 42 DEGREES
EKG P-R INTERVAL: 240 MS
EKG Q-T INTERVAL: 436 MS
EKG QRS DURATION: 122 MS
EKG QTC CALCULATION (BAZETT): 436 MS
EKG R AXIS: 112 DEGREES
EKG T AXIS: -2 DEGREES
EKG VENTRICULAR RATE: 60 BPM
EOSINOPHILS ABSOLUTE: 0.2 K/UL (ref 0–0.7)
EOSINOPHILS RELATIVE PERCENT: 2.7 %
GFR AFRICAN AMERICAN: 22.3
GFR NON-AFRICAN AMERICAN: 18.4
GLOBULIN: 3.7 G/DL (ref 2.3–3.5)
GLUCOSE BLD-MCNC: 88 MG/DL (ref 70–99)
HCT VFR BLD CALC: 31.2 % (ref 37–47)
HEMOGLOBIN: 10.7 G/DL (ref 12–16)
LYMPHOCYTES ABSOLUTE: 1.8 K/UL (ref 1–4.8)
LYMPHOCYTES RELATIVE PERCENT: 19.9 %
MCH RBC QN AUTO: 28.7 PG (ref 27–31.3)
MCHC RBC AUTO-ENTMCNC: 34.4 % (ref 33–37)
MCV RBC AUTO: 83.5 FL (ref 82–100)
MONOCYTES ABSOLUTE: 0.6 K/UL (ref 0.2–0.8)
MONOCYTES RELATIVE PERCENT: 6.8 %
NEUTROPHILS ABSOLUTE: 6.5 K/UL (ref 1.4–6.5)
NEUTROPHILS RELATIVE PERCENT: 70 %
PDW BLD-RTO: 14.5 % (ref 11.5–14.5)
PLATELET # BLD: 236 K/UL (ref 130–400)
POTASSIUM SERPL-SCNC: 5.2 MEQ/L (ref 3.4–4.9)
PRO-BNP: 1735 PG/ML
PROCALCITONIN: 0.07 NG/ML (ref 0–0.15)
RBC # BLD: 3.74 M/UL (ref 4.2–5.4)
SODIUM BLD-SCNC: 138 MEQ/L (ref 135–144)
TOTAL PROTEIN: 7.4 G/DL (ref 6.3–8)
TROPONIN: 0.03 NG/ML (ref 0–0.01)
WBC # BLD: 9.2 K/UL (ref 4.8–10.8)

## 2019-10-20 PROCEDURE — 99285 EMERGENCY DEPT VISIT HI MDM: CPT

## 2019-10-20 PROCEDURE — 70450 CT HEAD/BRAIN W/O DYE: CPT

## 2019-10-20 PROCEDURE — 93005 ELECTROCARDIOGRAM TRACING: CPT | Performed by: FAMILY MEDICINE

## 2019-10-20 PROCEDURE — 97162 PT EVAL MOD COMPLEX 30 MIN: CPT

## 2019-10-20 PROCEDURE — 85025 COMPLETE CBC W/AUTO DIFF WBC: CPT

## 2019-10-20 PROCEDURE — 84484 ASSAY OF TROPONIN QUANT: CPT

## 2019-10-20 PROCEDURE — 80053 COMPREHEN METABOLIC PANEL: CPT

## 2019-10-20 PROCEDURE — 97166 OT EVAL MOD COMPLEX 45 MIN: CPT

## 2019-10-20 PROCEDURE — 84145 PROCALCITONIN (PCT): CPT

## 2019-10-20 PROCEDURE — 36415 COLL VENOUS BLD VENIPUNCTURE: CPT

## 2019-10-20 PROCEDURE — 71045 X-RAY EXAM CHEST 1 VIEW: CPT

## 2019-10-20 PROCEDURE — 83880 ASSAY OF NATRIURETIC PEPTIDE: CPT

## 2019-10-20 ASSESSMENT — PAIN SCALES - GENERAL
PAINLEVEL_OUTOF10: 5
PAINLEVEL_OUTOF10: 5

## 2019-10-20 ASSESSMENT — PAIN DESCRIPTION - DESCRIPTORS: DESCRIPTORS: ACHING

## 2019-10-20 ASSESSMENT — PAIN DESCRIPTION - FREQUENCY: FREQUENCY: CONTINUOUS

## 2019-10-20 ASSESSMENT — PAIN DESCRIPTION - PAIN TYPE
TYPE: ACUTE PAIN

## 2019-10-20 ASSESSMENT — PAIN DESCRIPTION - LOCATION
LOCATION: BACK

## 2019-10-20 ASSESSMENT — ENCOUNTER SYMPTOMS
GASTROINTESTINAL NEGATIVE: 1
EYES NEGATIVE: 1
RESPIRATORY NEGATIVE: 1
ALLERGIC/IMMUNOLOGIC NEGATIVE: 1

## 2019-10-21 ENCOUNTER — OFFICE VISIT (OUTPATIENT)
Dept: GERIATRIC MEDICINE | Age: 59
End: 2019-10-21
Payer: MEDICARE

## 2019-10-21 ENCOUNTER — CARE COORDINATION (OUTPATIENT)
Dept: CASE MANAGEMENT | Age: 59
End: 2019-10-21

## 2019-10-21 DIAGNOSIS — E11.40 CHRONIC PAINFUL DIABETIC NEUROPATHY (HCC): ICD-10-CM

## 2019-10-21 PROCEDURE — G8482 FLU IMMUNIZE ORDER/ADMIN: HCPCS | Performed by: FAMILY MEDICINE

## 2019-10-21 PROCEDURE — 99306 1ST NF CARE HIGH MDM 50: CPT | Performed by: FAMILY MEDICINE

## 2019-10-21 PROCEDURE — 3044F HG A1C LEVEL LT 7.0%: CPT | Performed by: FAMILY MEDICINE

## 2019-10-21 PROCEDURE — 93010 ELECTROCARDIOGRAM REPORT: CPT | Performed by: INTERNAL MEDICINE

## 2019-10-21 RX ORDER — PREGABALIN 75 MG/1
CAPSULE ORAL
Qty: 60 CAPSULE | Refills: 1 | Status: SHIPPED | OUTPATIENT
Start: 2019-10-21 | End: 2020-08-28

## 2019-10-22 ENCOUNTER — OFFICE VISIT (OUTPATIENT)
Dept: ENDOCRINOLOGY | Age: 59
End: 2019-10-22
Payer: MEDICARE

## 2019-10-22 VITALS
BODY MASS INDEX: 37.82 KG/M2 | DIASTOLIC BLOOD PRESSURE: 56 MMHG | HEART RATE: 60 BPM | SYSTOLIC BLOOD PRESSURE: 120 MMHG | HEIGHT: 65 IN | WEIGHT: 227 LBS

## 2019-10-22 DIAGNOSIS — E11.65 UNCONTROLLED TYPE 2 DIABETES MELLITUS WITH HYPERGLYCEMIA (HCC): Primary | ICD-10-CM

## 2019-10-22 PROBLEM — E11.40 CONTROLLED TYPE 2 DIABETES MELLITUS WITH DIABETIC NEUROPATHY, WITH LONG-TERM CURRENT USE OF INSULIN (HCC): Status: RESOLVED | Noted: 2017-02-24 | Resolved: 2019-10-22

## 2019-10-22 PROBLEM — Z79.4 CONTROLLED TYPE 2 DIABETES MELLITUS WITH DIABETIC NEUROPATHY, WITH LONG-TERM CURRENT USE OF INSULIN (HCC): Status: RESOLVED | Noted: 2017-02-24 | Resolved: 2019-10-22

## 2019-10-22 LAB
ALBUMIN SERPL-MCNC: 3.6 G/DL
ALP BLD-CCNC: 96 U/L
ALT SERPL-CCNC: 13 U/L
ANION GAP SERPL CALCULATED.3IONS-SCNC: NORMAL MMOL/L
AST SERPL-CCNC: 16 U/L
AVERAGE GLUCOSE: 151
BASOPHILS ABSOLUTE: ABNORMAL
BASOPHILS RELATIVE PERCENT: ABNORMAL
BILIRUB SERPL-MCNC: 0.7 MG/DL (ref 0.1–1.4)
BUN BLDV-MCNC: 35 MG/DL
CALCIUM SERPL-MCNC: 10.1 MG/DL
CHLORIDE BLD-SCNC: 105 MMOL/L
CHP ED QC CHECK: NORMAL
CO2: 25 MMOL/L
CREAT SERPL-MCNC: 2.7 MG/DL
EOSINOPHILS ABSOLUTE: ABNORMAL
EOSINOPHILS RELATIVE PERCENT: ABNORMAL
GFR CALCULATED: NORMAL
GLUCOSE BLD-MCNC: 159 MG/DL
GLUCOSE BLD-MCNC: 204 MG/DL
HBA1C MFR BLD: 6.9 %
HCT VFR BLD CALC: 28.7 % (ref 36–46)
HEMOGLOBIN: 10 G/DL (ref 12–16)
LYMPHOCYTES ABSOLUTE: ABNORMAL
LYMPHOCYTES RELATIVE PERCENT: ABNORMAL
MCH RBC QN AUTO: 28.8 PG
MCHC RBC AUTO-ENTMCNC: 34.9 G/DL
MCV RBC AUTO: 82.7 FL
MONOCYTES ABSOLUTE: ABNORMAL
MONOCYTES RELATIVE PERCENT: ABNORMAL
NEUTROPHILS ABSOLUTE: ABNORMAL
NEUTROPHILS RELATIVE PERCENT: ABNORMAL
PLATELET # BLD: 177 K/ΜL
PMV BLD AUTO: 10.3 FL
POTASSIUM SERPL-SCNC: 4.8 MMOL/L
RBC # BLD: 3.48 10^6/ΜL
SODIUM BLD-SCNC: 139 MMOL/L
TOTAL PROTEIN: 6.5
VITAMIN D 25-HYDROXY: 21
VITAMIN D2, 25 HYDROXY: NORMAL
VITAMIN D3,25 HYDROXY: NORMAL
WBC # BLD: 7.9 10^3/ML

## 2019-10-22 PROCEDURE — G8417 CALC BMI ABV UP PARAM F/U: HCPCS | Performed by: PHYSICIAN ASSISTANT

## 2019-10-22 PROCEDURE — 2022F DILAT RTA XM EVC RTNOPTHY: CPT | Performed by: PHYSICIAN ASSISTANT

## 2019-10-22 PROCEDURE — 3044F HG A1C LEVEL LT 7.0%: CPT | Performed by: PHYSICIAN ASSISTANT

## 2019-10-22 PROCEDURE — G8598 ASA/ANTIPLAT THER USED: HCPCS | Performed by: PHYSICIAN ASSISTANT

## 2019-10-22 PROCEDURE — G8427 DOCREV CUR MEDS BY ELIG CLIN: HCPCS | Performed by: PHYSICIAN ASSISTANT

## 2019-10-22 PROCEDURE — 1036F TOBACCO NON-USER: CPT | Performed by: PHYSICIAN ASSISTANT

## 2019-10-22 PROCEDURE — 1111F DSCHRG MED/CURRENT MED MERGE: CPT | Performed by: PHYSICIAN ASSISTANT

## 2019-10-22 PROCEDURE — 99214 OFFICE O/P EST MOD 30 MIN: CPT | Performed by: PHYSICIAN ASSISTANT

## 2019-10-22 PROCEDURE — 3017F COLORECTAL CA SCREEN DOC REV: CPT | Performed by: PHYSICIAN ASSISTANT

## 2019-10-22 PROCEDURE — G8482 FLU IMMUNIZE ORDER/ADMIN: HCPCS | Performed by: PHYSICIAN ASSISTANT

## 2019-10-22 PROCEDURE — 82962 GLUCOSE BLOOD TEST: CPT | Performed by: PHYSICIAN ASSISTANT

## 2019-10-22 ASSESSMENT — ENCOUNTER SYMPTOMS
NAUSEA: 0
EYE PAIN: 0
RHINORRHEA: 0
SORE THROAT: 0
SINUS PRESSURE: 0
SHORTNESS OF BREATH: 0
ABDOMINAL PAIN: 0
DIARRHEA: 0
VOMITING: 0
EYE REDNESS: 0
WHEEZING: 0
COUGH: 0

## 2019-10-30 ENCOUNTER — OFFICE VISIT (OUTPATIENT)
Dept: CARDIOLOGY CLINIC | Age: 59
End: 2019-10-30
Payer: MEDICARE

## 2019-10-30 ENCOUNTER — OFFICE VISIT (OUTPATIENT)
Dept: GERIATRIC MEDICINE | Age: 59
End: 2019-10-30
Payer: MEDICARE

## 2019-10-30 VITALS
RESPIRATION RATE: 18 BRPM | WEIGHT: 234.2 LBS | BODY MASS INDEX: 38.97 KG/M2 | OXYGEN SATURATION: 96 % | SYSTOLIC BLOOD PRESSURE: 118 MMHG | HEART RATE: 63 BPM | DIASTOLIC BLOOD PRESSURE: 70 MMHG

## 2019-10-30 DIAGNOSIS — I25.119 CORONARY ARTERY DISEASE INVOLVING NATIVE CORONARY ARTERY OF NATIVE HEART WITH ANGINA PECTORIS (HCC): Primary | Chronic | ICD-10-CM

## 2019-10-30 DIAGNOSIS — M17.0 ARTHRITIS OF BOTH KNEES: ICD-10-CM

## 2019-10-30 DIAGNOSIS — E78.2 MIXED HYPERLIPIDEMIA: ICD-10-CM

## 2019-10-30 DIAGNOSIS — I50.30 DIASTOLIC CONGESTIVE HEART FAILURE, UNSPECIFIED HF CHRONICITY (HCC): ICD-10-CM

## 2019-10-30 DIAGNOSIS — R06.02 SHORTNESS OF BREATH: ICD-10-CM

## 2019-10-30 DIAGNOSIS — I10 ESSENTIAL HYPERTENSION: ICD-10-CM

## 2019-10-30 DIAGNOSIS — R06.02 SHORTNESS OF BREATH: Primary | ICD-10-CM

## 2019-10-30 DIAGNOSIS — G81.94 HEMIPARESIS, LEFT (HCC): ICD-10-CM

## 2019-10-30 PROCEDURE — 99316 NF DSCHRG MGMT 30 MIN+: CPT | Performed by: NURSE PRACTITIONER

## 2019-10-30 PROCEDURE — G8482 FLU IMMUNIZE ORDER/ADMIN: HCPCS | Performed by: INTERNAL MEDICINE

## 2019-10-30 PROCEDURE — 99213 OFFICE O/P EST LOW 20 MIN: CPT | Performed by: INTERNAL MEDICINE

## 2019-10-30 PROCEDURE — 1036F TOBACCO NON-USER: CPT | Performed by: INTERNAL MEDICINE

## 2019-10-30 PROCEDURE — 1111F DSCHRG MED/CURRENT MED MERGE: CPT | Performed by: INTERNAL MEDICINE

## 2019-10-30 PROCEDURE — G8417 CALC BMI ABV UP PARAM F/U: HCPCS | Performed by: INTERNAL MEDICINE

## 2019-10-30 PROCEDURE — 3017F COLORECTAL CA SCREEN DOC REV: CPT | Performed by: INTERNAL MEDICINE

## 2019-10-30 PROCEDURE — G8482 FLU IMMUNIZE ORDER/ADMIN: HCPCS | Performed by: NURSE PRACTITIONER

## 2019-10-30 PROCEDURE — G8598 ASA/ANTIPLAT THER USED: HCPCS | Performed by: INTERNAL MEDICINE

## 2019-10-30 PROCEDURE — G8427 DOCREV CUR MEDS BY ELIG CLIN: HCPCS | Performed by: INTERNAL MEDICINE

## 2019-11-01 LAB
BASOPHILS ABSOLUTE: ABNORMAL /ΜL
BASOPHILS RELATIVE PERCENT: ABNORMAL %
BUN BLDV-MCNC: 30 MG/DL
CALCIUM SERPL-MCNC: 10 MG/DL
CHLORIDE BLD-SCNC: 99 MMOL/L
CO2: 23 MMOL/L
CREAT SERPL-MCNC: 2.5 MG/DL
EOSINOPHILS ABSOLUTE: ABNORMAL /ΜL
EOSINOPHILS RELATIVE PERCENT: ABNORMAL %
GFR CALCULATED: 20
GLUCOSE BLD-MCNC: 256 MG/DL
HCT VFR BLD CALC: 29.1 % (ref 36–46)
HEMOGLOBIN: 10.1 G/DL (ref 12–16)
LYMPHOCYTES ABSOLUTE: ABNORMAL /ΜL
LYMPHOCYTES RELATIVE PERCENT: ABNORMAL %
MCH RBC QN AUTO: 29.3 PG
MCHC RBC AUTO-ENTMCNC: 34.9 G/DL
MCV RBC AUTO: 83.9 FL
MONOCYTES ABSOLUTE: ABNORMAL /ΜL
MONOCYTES RELATIVE PERCENT: ABNORMAL %
NEUTROPHILS ABSOLUTE: ABNORMAL /ΜL
NEUTROPHILS RELATIVE PERCENT: ABNORMAL %
PLATELET # BLD: 198 K/ΜL
PMV BLD AUTO: 10 FL
POTASSIUM SERPL-SCNC: 5.1 MMOL/L
RBC # BLD: 3.46 10^6/ΜL
SODIUM BLD-SCNC: 131 MMOL/L
WBC # BLD: 7.4 10^3/ML

## 2019-11-05 ENCOUNTER — CARE COORDINATION (OUTPATIENT)
Dept: CARE COORDINATION | Age: 59
End: 2019-11-05

## 2019-11-05 ENCOUNTER — TELEPHONE (OUTPATIENT)
Dept: FAMILY MEDICINE CLINIC | Age: 59
End: 2019-11-05

## 2019-11-05 ENCOUNTER — OFFICE VISIT (OUTPATIENT)
Dept: PULMONOLOGY | Age: 59
End: 2019-11-05
Payer: MEDICARE

## 2019-11-05 VITALS
HEIGHT: 65 IN | WEIGHT: 234 LBS | RESPIRATION RATE: 16 BRPM | HEART RATE: 75 BPM | TEMPERATURE: 97 F | OXYGEN SATURATION: 96 % | BODY MASS INDEX: 38.99 KG/M2 | SYSTOLIC BLOOD PRESSURE: 120 MMHG | DIASTOLIC BLOOD PRESSURE: 64 MMHG

## 2019-11-05 DIAGNOSIS — R06.02 SHORTNESS OF BREATH: ICD-10-CM

## 2019-11-05 DIAGNOSIS — G47.30 SLEEP APNEA, UNSPECIFIED TYPE: Primary | ICD-10-CM

## 2019-11-05 DIAGNOSIS — I27.20 PULMONARY HYPERTENSION (HCC): ICD-10-CM

## 2019-11-05 PROCEDURE — 1036F TOBACCO NON-USER: CPT | Performed by: INTERNAL MEDICINE

## 2019-11-05 PROCEDURE — G8598 ASA/ANTIPLAT THER USED: HCPCS | Performed by: INTERNAL MEDICINE

## 2019-11-05 PROCEDURE — G8427 DOCREV CUR MEDS BY ELIG CLIN: HCPCS | Performed by: INTERNAL MEDICINE

## 2019-11-05 PROCEDURE — 3017F COLORECTAL CA SCREEN DOC REV: CPT | Performed by: INTERNAL MEDICINE

## 2019-11-05 PROCEDURE — 99214 OFFICE O/P EST MOD 30 MIN: CPT | Performed by: INTERNAL MEDICINE

## 2019-11-05 PROCEDURE — G8417 CALC BMI ABV UP PARAM F/U: HCPCS | Performed by: INTERNAL MEDICINE

## 2019-11-05 PROCEDURE — G8482 FLU IMMUNIZE ORDER/ADMIN: HCPCS | Performed by: INTERNAL MEDICINE

## 2019-11-05 PROCEDURE — 1111F DSCHRG MED/CURRENT MED MERGE: CPT | Performed by: INTERNAL MEDICINE

## 2019-11-05 RX ORDER — GUAIFENESIN 100 MG/5ML
200 SOLUTION ORAL EVERY 4 HOURS PRN
Status: ON HOLD | COMMUNITY
End: 2019-11-25 | Stop reason: HOSPADM

## 2019-11-05 ASSESSMENT — ENCOUNTER SYMPTOMS
ABDOMINAL PAIN: 0
NAUSEA: 0
SINUS PRESSURE: 0
VOICE CHANGE: 0
TROUBLE SWALLOWING: 0
WHEEZING: 0
VOMITING: 0
COUGH: 0
RHINORRHEA: 0
SHORTNESS OF BREATH: 1
EYE ITCHING: 0
CHEST TIGHTNESS: 0
SORE THROAT: 0
DIARRHEA: 0
EYE DISCHARGE: 0

## 2019-11-06 NOTE — TELEPHONE ENCOUNTER
Called patient and she stated that she would like a prescription for a lift chair as her legs are still weak. Patient states that she finished therapy on Sunday and that she is coming to see the Doctor on the 18 as she she needs a to keep her legs elevated due to the heart condition and has a form to send to and the name of the company is [email or web address] and the fax number is [phone].  Patient states this is what she was calling in regards to

## 2019-11-06 NOTE — TELEPHONE ENCOUNTER
She needs to be seen. And please have her bring any necessary paperwork her insurance company requires.

## 2019-11-08 LAB
BASOPHILS ABSOLUTE: ABNORMAL /ΜL
BASOPHILS RELATIVE PERCENT: ABNORMAL %
BUN BLDV-MCNC: 23 MG/DL
CALCIUM SERPL-MCNC: 9.4 MG/DL
CHLORIDE BLD-SCNC: 97 MMOL/L
CO2: 23 MMOL/L
CREAT SERPL-MCNC: 2.2 MG/DL
EOSINOPHILS ABSOLUTE: ABNORMAL /ΜL
EOSINOPHILS RELATIVE PERCENT: ABNORMAL %
GFR CALCULATED: NORMAL
GLUCOSE BLD-MCNC: 258 MG/DL
HCT VFR BLD CALC: 27.6 % (ref 36–46)
HEMOGLOBIN: 9.5 G/DL (ref 12–16)
LYMPHOCYTES ABSOLUTE: ABNORMAL /ΜL
LYMPHOCYTES RELATIVE PERCENT: ABNORMAL %
MCH RBC QN AUTO: 28.5 PG
MCHC RBC AUTO-ENTMCNC: 34.4 G/DL
MCV RBC AUTO: 82.8 FL
MONOCYTES ABSOLUTE: ABNORMAL /ΜL
MONOCYTES RELATIVE PERCENT: ABNORMAL %
NEUTROPHILS ABSOLUTE: ABNORMAL /ΜL
NEUTROPHILS RELATIVE PERCENT: ABNORMAL %
PLATELET # BLD: 162 K/ΜL
PMV BLD AUTO: 10.1 FL
POTASSIUM SERPL-SCNC: 4.7 MMOL/L
RBC # BLD: 3.33 10^6/ΜL
SODIUM BLD-SCNC: 130 MMOL/L
WBC # BLD: 6.7 10^3/ML

## 2019-11-12 ENCOUNTER — OFFICE VISIT (OUTPATIENT)
Dept: GERIATRIC MEDICINE | Age: 59
End: 2019-11-12
Payer: MEDICARE

## 2019-11-12 DIAGNOSIS — R29.6 FREQUENT FALLS: ICD-10-CM

## 2019-11-12 DIAGNOSIS — F20.0 PARANOID SCHIZOPHRENIA (HCC): ICD-10-CM

## 2019-11-12 DIAGNOSIS — R53.1 WEAKNESS: Primary | ICD-10-CM

## 2019-11-12 DIAGNOSIS — I50.30 DIASTOLIC CONGESTIVE HEART FAILURE, UNSPECIFIED HF CHRONICITY (HCC): ICD-10-CM

## 2019-11-12 PROCEDURE — G8482 FLU IMMUNIZE ORDER/ADMIN: HCPCS | Performed by: NURSE PRACTITIONER

## 2019-11-12 PROCEDURE — 99316 NF DSCHRG MGMT 30 MIN+: CPT | Performed by: NURSE PRACTITIONER

## 2019-11-13 ENCOUNTER — TELEPHONE (OUTPATIENT)
Dept: FAMILY MEDICINE CLINIC | Age: 59
End: 2019-11-13

## 2019-11-13 ENCOUNTER — OFFICE VISIT (OUTPATIENT)
Dept: PALLATIVE CARE | Age: 59
End: 2019-11-13
Payer: MEDICARE

## 2019-11-13 VITALS
TEMPERATURE: 98.2 F | BODY MASS INDEX: 39.51 KG/M2 | SYSTOLIC BLOOD PRESSURE: 145 MMHG | WEIGHT: 237.4 LBS | DIASTOLIC BLOOD PRESSURE: 69 MMHG | HEART RATE: 81 BPM | OXYGEN SATURATION: 94 % | RESPIRATION RATE: 20 BRPM

## 2019-11-13 DIAGNOSIS — R06.02 SHORTNESS OF BREATH: Primary | ICD-10-CM

## 2019-11-13 DIAGNOSIS — I50.30 DIASTOLIC CONGESTIVE HEART FAILURE, UNSPECIFIED HF CHRONICITY (HCC): ICD-10-CM

## 2019-11-13 DIAGNOSIS — Z51.5 PALLIATIVE CARE PATIENT: ICD-10-CM

## 2019-11-13 PROCEDURE — 1036F TOBACCO NON-USER: CPT | Performed by: NURSE PRACTITIONER

## 2019-11-13 PROCEDURE — 3017F COLORECTAL CA SCREEN DOC REV: CPT | Performed by: NURSE PRACTITIONER

## 2019-11-13 PROCEDURE — G8482 FLU IMMUNIZE ORDER/ADMIN: HCPCS | Performed by: NURSE PRACTITIONER

## 2019-11-13 PROCEDURE — 99214 OFFICE O/P EST MOD 30 MIN: CPT | Performed by: NURSE PRACTITIONER

## 2019-11-13 PROCEDURE — G8598 ASA/ANTIPLAT THER USED: HCPCS | Performed by: NURSE PRACTITIONER

## 2019-11-13 PROCEDURE — G8417 CALC BMI ABV UP PARAM F/U: HCPCS | Performed by: NURSE PRACTITIONER

## 2019-11-13 PROCEDURE — G8427 DOCREV CUR MEDS BY ELIG CLIN: HCPCS | Performed by: NURSE PRACTITIONER

## 2019-11-13 ASSESSMENT — ENCOUNTER SYMPTOMS
TROUBLE SWALLOWING: 0
VOMITING: 0
WHEEZING: 0
DIARRHEA: 0
BACK PAIN: 0
CHEST TIGHTNESS: 0
NAUSEA: 0
SINUS PAIN: 0
SORE THROAT: 0
ABDOMINAL PAIN: 0
CONSTIPATION: 0
SHORTNESS OF BREATH: 0
COUGH: 0

## 2019-11-18 ENCOUNTER — HOSPITAL ENCOUNTER (EMERGENCY)
Age: 59
Discharge: HOME | End: 2019-11-18
Attending: EMERGENCY MEDICINE
Payer: MEDICARE

## 2019-11-18 ENCOUNTER — APPOINTMENT (OUTPATIENT)
Dept: GENERAL RADIOLOGY | Age: 59
End: 2019-11-18
Payer: MEDICARE

## 2019-11-18 VITALS
BODY MASS INDEX: 36.57 KG/M2 | RESPIRATION RATE: 18 BRPM | TEMPERATURE: 97.7 F | DIASTOLIC BLOOD PRESSURE: 67 MMHG | HEART RATE: 55 BPM | OXYGEN SATURATION: 97 % | WEIGHT: 233 LBS | HEIGHT: 67 IN | SYSTOLIC BLOOD PRESSURE: 144 MMHG

## 2019-11-18 DIAGNOSIS — S42.301A CLOSED FRACTURE OF SHAFT OF RIGHT HUMERUS, UNSPECIFIED FRACTURE MORPHOLOGY, INITIAL ENCOUNTER: Primary | ICD-10-CM

## 2019-11-18 PROCEDURE — 6370000000 HC RX 637 (ALT 250 FOR IP): Performed by: EMERGENCY MEDICINE

## 2019-11-18 PROCEDURE — 29105 APPLICATION LONG ARM SPLINT: CPT

## 2019-11-18 PROCEDURE — 99283 EMERGENCY DEPT VISIT LOW MDM: CPT

## 2019-11-18 PROCEDURE — 73060 X-RAY EXAM OF HUMERUS: CPT

## 2019-11-18 RX ORDER — OXYCODONE HYDROCHLORIDE AND ACETAMINOPHEN 5; 325 MG/1; MG/1
1-2 TABLET ORAL EVERY 4 HOURS PRN
Qty: 20 TABLET | Refills: 0 | Status: ON HOLD | OUTPATIENT
Start: 2019-11-18 | End: 2019-11-25 | Stop reason: HOSPADM

## 2019-11-18 RX ORDER — OXYCODONE HYDROCHLORIDE AND ACETAMINOPHEN 5; 325 MG/1; MG/1
1 TABLET ORAL ONCE
Status: COMPLETED | OUTPATIENT
Start: 2019-11-18 | End: 2019-11-18

## 2019-11-18 RX ADMIN — OXYCODONE HYDROCHLORIDE AND ACETAMINOPHEN 1 TABLET: 5; 325 TABLET ORAL at 08:02

## 2019-11-18 ASSESSMENT — PAIN DESCRIPTION - ORIENTATION: ORIENTATION: RIGHT

## 2019-11-18 ASSESSMENT — ENCOUNTER SYMPTOMS
SORE THROAT: 0
NAUSEA: 0
EYE PAIN: 0
ABDOMINAL PAIN: 0
SHORTNESS OF BREATH: 0
CHEST TIGHTNESS: 0
VOMITING: 0

## 2019-11-18 ASSESSMENT — PAIN SCALES - GENERAL
PAINLEVEL_OUTOF10: 10

## 2019-11-18 ASSESSMENT — PAIN DESCRIPTION - PAIN TYPE: TYPE: ACUTE PAIN

## 2019-11-18 ASSESSMENT — PAIN DESCRIPTION - DESCRIPTORS: DESCRIPTORS: ACHING

## 2019-11-20 ENCOUNTER — HOSPITAL ENCOUNTER (EMERGENCY)
Age: 59
Discharge: HOME | DRG: 917 | End: 2019-11-20
Attending: EMERGENCY MEDICINE
Payer: MEDICARE

## 2019-11-20 VITALS
RESPIRATION RATE: 19 BRPM | DIASTOLIC BLOOD PRESSURE: 64 MMHG | OXYGEN SATURATION: 96 % | TEMPERATURE: 98.5 F | HEIGHT: 67 IN | WEIGHT: 233 LBS | SYSTOLIC BLOOD PRESSURE: 152 MMHG | HEART RATE: 80 BPM | BODY MASS INDEX: 36.57 KG/M2

## 2019-11-20 DIAGNOSIS — T40.601A OPIATE OVERDOSE, ACCIDENTAL OR UNINTENTIONAL, INITIAL ENCOUNTER (HCC): Primary | ICD-10-CM

## 2019-11-20 LAB
ACETAMINOPHEN LEVEL: <5 UG/ML (ref 10–30)
CHP ED QC CHECK: YES
EKG ATRIAL RATE: 83 BPM
EKG P AXIS: 67 DEGREES
EKG P-R INTERVAL: 248 MS
EKG Q-T INTERVAL: 400 MS
EKG QRS DURATION: 128 MS
EKG QTC CALCULATION (BAZETT): 470 MS
EKG R AXIS: -58 DEGREES
EKG T AXIS: 80 DEGREES
EKG VENTRICULAR RATE: 83 BPM
GLUCOSE BLD-MCNC: 284 MG/DL (ref 60–115)
GLUCOSE BLD-MCNC: 287 MG/DL
PERFORMED ON: ABNORMAL

## 2019-11-20 PROCEDURE — 6370000000 HC RX 637 (ALT 250 FOR IP): Performed by: EMERGENCY MEDICINE

## 2019-11-20 PROCEDURE — G0480 DRUG TEST DEF 1-7 CLASSES: HCPCS

## 2019-11-20 PROCEDURE — 96374 THER/PROPH/DIAG INJ IV PUSH: CPT

## 2019-11-20 PROCEDURE — 93005 ELECTROCARDIOGRAM TRACING: CPT | Performed by: EMERGENCY MEDICINE

## 2019-11-20 PROCEDURE — 36415 COLL VENOUS BLD VENIPUNCTURE: CPT

## 2019-11-20 PROCEDURE — 96372 THER/PROPH/DIAG INJ SC/IM: CPT

## 2019-11-20 PROCEDURE — 99284 EMERGENCY DEPT VISIT MOD MDM: CPT

## 2019-11-20 PROCEDURE — 96376 TX/PRO/DX INJ SAME DRUG ADON: CPT

## 2019-11-20 PROCEDURE — 6360000002 HC RX W HCPCS: Performed by: EMERGENCY MEDICINE

## 2019-11-20 RX ORDER — IBUPROFEN 800 MG/1
800 TABLET ORAL ONCE
Status: COMPLETED | OUTPATIENT
Start: 2019-11-20 | End: 2019-11-20

## 2019-11-20 RX ORDER — NALOXONE HYDROCHLORIDE 0.4 MG/ML
0.4 INJECTION, SOLUTION INTRAMUSCULAR; INTRAVENOUS; SUBCUTANEOUS ONCE
Status: COMPLETED | OUTPATIENT
Start: 2019-11-20 | End: 2019-11-20

## 2019-11-20 RX ORDER — NALOXONE HYDROCHLORIDE 0.4 MG/ML
0.2 INJECTION, SOLUTION INTRAMUSCULAR; INTRAVENOUS; SUBCUTANEOUS ONCE
Status: COMPLETED | OUTPATIENT
Start: 2019-11-20 | End: 2019-11-20

## 2019-11-20 RX ADMIN — IBUPROFEN 800 MG: 800 TABLET, FILM COATED ORAL at 10:54

## 2019-11-20 RX ADMIN — NALOXONE HYDROCHLORIDE 0.4 MG: 0.4 INJECTION, SOLUTION INTRAMUSCULAR; INTRAVENOUS; SUBCUTANEOUS at 11:11

## 2019-11-20 RX ADMIN — NALOXONE HYDROCHLORIDE 0.4 MG: 0.4 INJECTION, SOLUTION INTRAMUSCULAR; INTRAVENOUS; SUBCUTANEOUS at 12:35

## 2019-11-20 RX ADMIN — NALOXONE HYDROCHLORIDE 0.2 MG: 0.4 INJECTION, SOLUTION INTRAMUSCULAR; INTRAVENOUS; SUBCUTANEOUS at 08:49

## 2019-11-20 RX ADMIN — NALOXONE HYDROCHLORIDE 0.2 MG: 0.4 INJECTION, SOLUTION INTRAMUSCULAR; INTRAVENOUS; SUBCUTANEOUS at 10:12

## 2019-11-20 ASSESSMENT — PAIN SCALES - GENERAL
PAINLEVEL_OUTOF10: 7
PAINLEVEL_OUTOF10: 7
PAINLEVEL_OUTOF10: 10

## 2019-11-20 ASSESSMENT — ENCOUNTER SYMPTOMS
VOICE CHANGE: 0
TROUBLE SWALLOWING: 0
COLOR CHANGE: 0
CHOKING: 0
BACK PAIN: 0
COUGH: 0
STRIDOR: 0
DIARRHEA: 0
EYE PAIN: 0
SORE THROAT: 0
CONSTIPATION: 0
ABDOMINAL DISTENTION: 0
ABDOMINAL PAIN: 0
BLOOD IN STOOL: 0
NAUSEA: 0
SINUS PRESSURE: 0
WHEEZING: 0
VOMITING: 0
ANAL BLEEDING: 0
CHEST TIGHTNESS: 0
RHINORRHEA: 0
EYE DISCHARGE: 0
SHORTNESS OF BREATH: 0
EYE ITCHING: 0
EYE REDNESS: 0
FACIAL SWELLING: 0
PHOTOPHOBIA: 0

## 2019-11-20 ASSESSMENT — PAIN DESCRIPTION - LOCATION: LOCATION: ARM

## 2019-11-20 ASSESSMENT — PAIN DESCRIPTION - ORIENTATION: ORIENTATION: RIGHT

## 2019-11-21 ENCOUNTER — CARE COORDINATION (OUTPATIENT)
Dept: CARE COORDINATION | Age: 59
End: 2019-11-21

## 2019-11-21 ENCOUNTER — TELEPHONE (OUTPATIENT)
Dept: FAMILY MEDICINE CLINIC | Age: 59
End: 2019-11-21

## 2019-11-22 ENCOUNTER — TELEPHONE (OUTPATIENT)
Dept: FAMILY MEDICINE CLINIC | Age: 59
End: 2019-11-22

## 2019-11-22 ENCOUNTER — APPOINTMENT (OUTPATIENT)
Dept: CT IMAGING | Age: 59
DRG: 917 | End: 2019-11-22
Payer: MEDICARE

## 2019-11-22 ENCOUNTER — APPOINTMENT (OUTPATIENT)
Dept: GENERAL RADIOLOGY | Age: 59
DRG: 917 | End: 2019-11-22
Payer: MEDICARE

## 2019-11-22 ENCOUNTER — HOSPITAL ENCOUNTER (INPATIENT)
Age: 59
LOS: 3 days | Discharge: NURSING, REHAB OR LONG-TERM CARE | DRG: 917 | End: 2019-11-25
Attending: STUDENT IN AN ORGANIZED HEALTH CARE EDUCATION/TRAINING PROGRAM | Admitting: INTERNAL MEDICINE
Payer: MEDICARE

## 2019-11-22 DIAGNOSIS — D64.9 LOW HEMOGLOBIN: ICD-10-CM

## 2019-11-22 DIAGNOSIS — N17.9 ACUTE KIDNEY INJURY (HCC): ICD-10-CM

## 2019-11-22 DIAGNOSIS — R41.82 ALTERED MENTAL STATUS, UNSPECIFIED ALTERED MENTAL STATUS TYPE: ICD-10-CM

## 2019-11-22 DIAGNOSIS — R53.83 FATIGUE, UNSPECIFIED TYPE: Primary | ICD-10-CM

## 2019-11-22 PROBLEM — G93.40 ENCEPHALOPATHY: Status: ACTIVE | Noted: 2019-11-22

## 2019-11-22 LAB
ALBUMIN SERPL-MCNC: 3.4 G/DL (ref 3.5–4.6)
ALP BLD-CCNC: 92 U/L (ref 40–130)
ALT SERPL-CCNC: 13 U/L (ref 0–33)
AMMONIA: 22 UMOL/L (ref 11–51)
ANION GAP SERPL CALCULATED.3IONS-SCNC: 14 MEQ/L (ref 9–15)
AST SERPL-CCNC: 18 U/L (ref 0–35)
BASE EXCESS ARTERIAL: -5 (ref -3–3)
BASOPHILS ABSOLUTE: 0.1 K/UL (ref 0–0.2)
BASOPHILS RELATIVE PERCENT: 1 %
BILIRUB SERPL-MCNC: 0.3 MG/DL (ref 0.2–0.7)
BUN BLDV-MCNC: 79 MG/DL (ref 8–23)
CALCIUM IONIZED: 1.28 MMOL/L (ref 1.12–1.32)
CALCIUM SERPL-MCNC: 9.7 MG/DL (ref 8.5–9.9)
CHLORIDE BLD-SCNC: 106 MEQ/L (ref 95–107)
CK MB: 4.4 NG/ML (ref 0–3.8)
CO2: 20 MEQ/L (ref 20–31)
CREAT SERPL-MCNC: 5.04 MG/DL (ref 0.5–0.9)
CREATINE KINASE-MB INDEX: 2.3 % (ref 0–3.5)
EKG ATRIAL RATE: 69 BPM
EKG P AXIS: 27 DEGREES
EKG P-R INTERVAL: 246 MS
EKG Q-T INTERVAL: 432 MS
EKG QRS DURATION: 130 MS
EKG QTC CALCULATION (BAZETT): 462 MS
EKG R AXIS: -52 DEGREES
EKG T AXIS: 78 DEGREES
EKG VENTRICULAR RATE: 69 BPM
EOSINOPHILS ABSOLUTE: 0.4 K/UL (ref 0–0.7)
EOSINOPHILS RELATIVE PERCENT: 4.8 %
ETHANOL PERCENT: NORMAL G/DL
ETHANOL: <10 MG/DL (ref 0–0.08)
GFR AFRICAN AMERICAN: 10.5
GFR AFRICAN AMERICAN: 11
GFR NON-AFRICAN AMERICAN: 8.7
GFR NON-AFRICAN AMERICAN: 9
GLOBULIN: 3.3 G/DL (ref 2.3–3.5)
GLUCOSE BLD-MCNC: 140 MG/DL (ref 70–99)
GLUCOSE BLD-MCNC: 180 MG/DL (ref 60–115)
GLUCOSE BLD-MCNC: 196 MG/DL (ref 60–115)
HCO3 ARTERIAL: 21.1 MMOL/L (ref 21–29)
HCT VFR BLD CALC: 27.9 % (ref 37–47)
HEMOGLOBIN: 9.5 G/DL (ref 12–16)
HEMOGLOBIN: 9.9 GM/DL (ref 12–16)
LACTATE: 0.43 MMOL/L (ref 0.4–2)
LYMPHOCYTES ABSOLUTE: 1.6 K/UL (ref 1–4.8)
LYMPHOCYTES RELATIVE PERCENT: 20.2 %
MCH RBC QN AUTO: 29 PG (ref 27–31.3)
MCHC RBC AUTO-ENTMCNC: 34 % (ref 33–37)
MCV RBC AUTO: 85.1 FL (ref 82–100)
MONOCYTES ABSOLUTE: 0.6 K/UL (ref 0.2–0.8)
MONOCYTES RELATIVE PERCENT: 7.7 %
NEUTROPHILS ABSOLUTE: 5.4 K/UL (ref 1.4–6.5)
NEUTROPHILS RELATIVE PERCENT: 66.3 %
O2 SAT, ARTERIAL: 90 % (ref 93–100)
PCO2 ARTERIAL: 39 MM HG (ref 35–45)
PDW BLD-RTO: 14.7 % (ref 11.5–14.5)
PERFORMED ON: ABNORMAL
PERFORMED ON: ABNORMAL
PH ARTERIAL: 7.34 (ref 7.35–7.45)
PLATELET # BLD: 222 K/UL (ref 130–400)
PO2 ARTERIAL: 61 MM HG (ref 75–108)
POC CHLORIDE: 111 MEQ/L (ref 99–110)
POC CREATININE: 4.9 MG/DL (ref 0.6–1.2)
POC HEMATOCRIT: 29 % (ref 36–48)
POC POTASSIUM: 4.6 MEQ/L (ref 3.5–5.1)
POC SAMPLE TYPE: ABNORMAL
POC SODIUM: 142 MEQ/L (ref 136–145)
POTASSIUM SERPL-SCNC: 4.6 MEQ/L (ref 3.4–4.9)
RBC # BLD: 3.28 M/UL (ref 4.2–5.4)
REASON FOR REJECTION: NORMAL
REJECTED TEST: NORMAL
SODIUM BLD-SCNC: 140 MEQ/L (ref 135–144)
TCO2 ARTERIAL: 22 (ref 22–29)
TOTAL CK: 190 U/L (ref 0–170)
TOTAL PROTEIN: 6.7 G/DL (ref 6.3–8)
TROPONIN: 0.02 NG/ML (ref 0–0.01)
WBC # BLD: 8.1 K/UL (ref 4.8–10.8)

## 2019-11-22 PROCEDURE — 82330 ASSAY OF CALCIUM: CPT

## 2019-11-22 PROCEDURE — 84300 ASSAY OF URINE SODIUM: CPT

## 2019-11-22 PROCEDURE — 82435 ASSAY OF BLOOD CHLORIDE: CPT

## 2019-11-22 PROCEDURE — 84540 ASSAY OF URINE/UREA-N: CPT

## 2019-11-22 PROCEDURE — 99285 EMERGENCY DEPT VISIT HI MDM: CPT

## 2019-11-22 PROCEDURE — 1210000000 HC MED SURG R&B

## 2019-11-22 PROCEDURE — 6360000002 HC RX W HCPCS: Performed by: STUDENT IN AN ORGANIZED HEALTH CARE EDUCATION/TRAINING PROGRAM

## 2019-11-22 PROCEDURE — 85014 HEMATOCRIT: CPT

## 2019-11-22 PROCEDURE — 2580000003 HC RX 258: Performed by: PHYSICIAN ASSISTANT

## 2019-11-22 PROCEDURE — 85025 COMPLETE CBC W/AUTO DIFF WBC: CPT

## 2019-11-22 PROCEDURE — 71045 X-RAY EXAM CHEST 1 VIEW: CPT

## 2019-11-22 PROCEDURE — 84295 ASSAY OF SERUM SODIUM: CPT

## 2019-11-22 PROCEDURE — 36415 COLL VENOUS BLD VENIPUNCTURE: CPT

## 2019-11-22 PROCEDURE — 84132 ASSAY OF SERUM POTASSIUM: CPT

## 2019-11-22 PROCEDURE — 83605 ASSAY OF LACTIC ACID: CPT

## 2019-11-22 PROCEDURE — 82570 ASSAY OF URINE CREATININE: CPT

## 2019-11-22 PROCEDURE — 82803 BLOOD GASES ANY COMBINATION: CPT

## 2019-11-22 PROCEDURE — 80307 DRUG TEST PRSMV CHEM ANLYZR: CPT

## 2019-11-22 PROCEDURE — 82553 CREATINE MB FRACTION: CPT

## 2019-11-22 PROCEDURE — G0480 DRUG TEST DEF 1-7 CLASSES: HCPCS

## 2019-11-22 PROCEDURE — 96374 THER/PROPH/DIAG INJ IV PUSH: CPT

## 2019-11-22 PROCEDURE — 82550 ASSAY OF CK (CPK): CPT

## 2019-11-22 PROCEDURE — 93005 ELECTROCARDIOGRAM TRACING: CPT | Performed by: PHYSICIAN ASSISTANT

## 2019-11-22 PROCEDURE — 80053 COMPREHEN METABOLIC PANEL: CPT

## 2019-11-22 PROCEDURE — 70450 CT HEAD/BRAIN W/O DYE: CPT

## 2019-11-22 PROCEDURE — 36600 WITHDRAWAL OF ARTERIAL BLOOD: CPT

## 2019-11-22 PROCEDURE — 82565 ASSAY OF CREATININE: CPT

## 2019-11-22 PROCEDURE — 84484 ASSAY OF TROPONIN QUANT: CPT

## 2019-11-22 PROCEDURE — 82140 ASSAY OF AMMONIA: CPT

## 2019-11-22 PROCEDURE — 6360000002 HC RX W HCPCS: Performed by: PHYSICIAN ASSISTANT

## 2019-11-22 RX ORDER — ONDANSETRON 2 MG/ML
4 INJECTION INTRAMUSCULAR; INTRAVENOUS EVERY 6 HOURS PRN
Status: DISCONTINUED | OUTPATIENT
Start: 2019-11-22 | End: 2019-11-25 | Stop reason: HOSPADM

## 2019-11-22 RX ORDER — NALOXONE HYDROCHLORIDE 1 MG/ML
4 INJECTION INTRAMUSCULAR; INTRAVENOUS; SUBCUTANEOUS ONCE
Status: COMPLETED | OUTPATIENT
Start: 2019-11-22 | End: 2019-11-22

## 2019-11-22 RX ORDER — HEPARIN SODIUM 5000 [USP'U]/ML
5000 INJECTION, SOLUTION INTRAVENOUS; SUBCUTANEOUS EVERY 8 HOURS SCHEDULED
Status: DISCONTINUED | OUTPATIENT
Start: 2019-11-22 | End: 2019-11-25 | Stop reason: HOSPADM

## 2019-11-22 RX ORDER — CLOPIDOGREL BISULFATE 75 MG/1
75 TABLET ORAL DAILY
Status: DISCONTINUED | OUTPATIENT
Start: 2019-11-23 | End: 2019-11-25 | Stop reason: HOSPADM

## 2019-11-22 RX ORDER — INSULIN GLARGINE 100 [IU]/ML
15 INJECTION, SOLUTION SUBCUTANEOUS NIGHTLY
Status: DISCONTINUED | OUTPATIENT
Start: 2019-11-22 | End: 2019-11-25 | Stop reason: HOSPADM

## 2019-11-22 RX ORDER — CARVEDILOL 6.25 MG/1
6.25 TABLET ORAL 2 TIMES DAILY WITH MEALS
Status: DISCONTINUED | OUTPATIENT
Start: 2019-11-23 | End: 2019-11-25 | Stop reason: HOSPADM

## 2019-11-22 RX ORDER — SODIUM CHLORIDE 9 MG/ML
INJECTION, SOLUTION INTRAVENOUS CONTINUOUS
Status: DISCONTINUED | OUTPATIENT
Start: 2019-11-22 | End: 2019-11-22

## 2019-11-22 RX ORDER — ISOSORBIDE MONONITRATE 60 MG/1
60 TABLET, EXTENDED RELEASE ORAL DAILY
Status: DISCONTINUED | OUTPATIENT
Start: 2019-11-23 | End: 2019-11-25

## 2019-11-22 RX ORDER — NALOXONE HYDROCHLORIDE 1 MG/ML
2 INJECTION INTRAMUSCULAR; INTRAVENOUS; SUBCUTANEOUS ONCE
Status: COMPLETED | OUTPATIENT
Start: 2019-11-22 | End: 2019-11-22

## 2019-11-22 RX ORDER — NALOXONE HYDROCHLORIDE 0.4 MG/ML
0.4 INJECTION, SOLUTION INTRAMUSCULAR; INTRAVENOUS; SUBCUTANEOUS ONCE
Status: COMPLETED | OUTPATIENT
Start: 2019-11-22 | End: 2019-11-22

## 2019-11-22 RX ORDER — ATORVASTATIN CALCIUM 40 MG/1
40 TABLET, FILM COATED ORAL NIGHTLY
Status: DISCONTINUED | OUTPATIENT
Start: 2019-11-22 | End: 2019-11-25 | Stop reason: HOSPADM

## 2019-11-22 RX ORDER — SODIUM CHLORIDE 0.9 % (FLUSH) 0.9 %
10 SYRINGE (ML) INJECTION PRN
Status: DISCONTINUED | OUTPATIENT
Start: 2019-11-22 | End: 2019-11-25 | Stop reason: HOSPADM

## 2019-11-22 RX ORDER — SODIUM CHLORIDE 0.9 % (FLUSH) 0.9 %
10 SYRINGE (ML) INJECTION EVERY 12 HOURS SCHEDULED
Status: DISCONTINUED | OUTPATIENT
Start: 2019-11-22 | End: 2019-11-25 | Stop reason: HOSPADM

## 2019-11-22 RX ORDER — NALOXONE HYDROCHLORIDE 0.4 MG/ML
0.4 INJECTION, SOLUTION INTRAMUSCULAR; INTRAVENOUS; SUBCUTANEOUS PRN
Status: DISCONTINUED | OUTPATIENT
Start: 2019-11-22 | End: 2019-11-25 | Stop reason: HOSPADM

## 2019-11-22 RX ORDER — ACETAMINOPHEN 325 MG/1
650 TABLET ORAL EVERY 4 HOURS PRN
Status: DISCONTINUED | OUTPATIENT
Start: 2019-11-22 | End: 2019-11-25 | Stop reason: HOSPADM

## 2019-11-22 RX ORDER — SODIUM CHLORIDE 9 MG/ML
INJECTION, SOLUTION INTRAVENOUS CONTINUOUS
Status: DISCONTINUED | OUTPATIENT
Start: 2019-11-22 | End: 2019-11-24

## 2019-11-22 RX ORDER — DEXTROSE MONOHYDRATE 25 G/50ML
12.5 INJECTION, SOLUTION INTRAVENOUS PRN
Status: DISCONTINUED | OUTPATIENT
Start: 2019-11-22 | End: 2019-11-25 | Stop reason: HOSPADM

## 2019-11-22 RX ORDER — DEXTROSE MONOHYDRATE 50 MG/ML
100 INJECTION, SOLUTION INTRAVENOUS PRN
Status: DISCONTINUED | OUTPATIENT
Start: 2019-11-22 | End: 2019-11-25 | Stop reason: HOSPADM

## 2019-11-22 RX ORDER — FLUMAZENIL 0.1 MG/ML
0.5 INJECTION, SOLUTION INTRAVENOUS ONCE
Status: DISCONTINUED | OUTPATIENT
Start: 2019-11-22 | End: 2019-11-22

## 2019-11-22 RX ORDER — NICOTINE POLACRILEX 4 MG
15 LOZENGE BUCCAL PRN
Status: DISCONTINUED | OUTPATIENT
Start: 2019-11-22 | End: 2019-11-25 | Stop reason: HOSPADM

## 2019-11-22 RX ADMIN — NALOXONE HYDROCHLORIDE 0.4 MG: 0.4 INJECTION, SOLUTION INTRAMUSCULAR; INTRAVENOUS; SUBCUTANEOUS at 16:57

## 2019-11-22 RX ADMIN — SODIUM CHLORIDE: 9 INJECTION, SOLUTION INTRAVENOUS at 16:57

## 2019-11-22 RX ADMIN — NALOXONE HYDROCHLORIDE 2 MG: 1 INJECTION PARENTERAL at 17:18

## 2019-11-22 RX ADMIN — NALOXONE HYDROCHLORIDE 4 MG: 1 INJECTION PARENTERAL at 17:05

## 2019-11-22 ASSESSMENT — ENCOUNTER SYMPTOMS
CHOKING: 0
ABDOMINAL PAIN: 0
COLOR CHANGE: 0
DIARRHEA: 0
RHINORRHEA: 0
SORE THROAT: 0
CONSTIPATION: 0
COUGH: 0
BACK PAIN: 0
SHORTNESS OF BREATH: 0
EYE DISCHARGE: 0
ABDOMINAL DISTENTION: 0
VOMITING: 0
NAUSEA: 0

## 2019-11-23 LAB
ANION GAP SERPL CALCULATED.3IONS-SCNC: 14 MEQ/L (ref 9–15)
BASOPHILS ABSOLUTE: 0 K/UL (ref 0–0.2)
BASOPHILS RELATIVE PERCENT: 0.3 %
BUN BLDV-MCNC: 73 MG/DL (ref 8–23)
CALCIUM SERPL-MCNC: 9.5 MG/DL (ref 8.5–9.9)
CHLORIDE BLD-SCNC: 114 MEQ/L (ref 95–107)
CO2: 18 MEQ/L (ref 20–31)
CREAT SERPL-MCNC: 4.37 MG/DL (ref 0.5–0.9)
EOSINOPHILS ABSOLUTE: 0.3 K/UL (ref 0–0.7)
EOSINOPHILS RELATIVE PERCENT: 4.5 %
GFR AFRICAN AMERICAN: 12.4
GFR NON-AFRICAN AMERICAN: 10.2
GLUCOSE BLD-MCNC: 126 MG/DL (ref 70–99)
GLUCOSE BLD-MCNC: 151 MG/DL (ref 60–115)
GLUCOSE BLD-MCNC: 183 MG/DL (ref 60–115)
GLUCOSE BLD-MCNC: 217 MG/DL (ref 60–115)
GLUCOSE BLD-MCNC: 262 MG/DL (ref 60–115)
HCT VFR BLD CALC: 26.5 % (ref 37–47)
HEMOGLOBIN: 8.9 G/DL (ref 12–16)
LYMPHOCYTES ABSOLUTE: 2 K/UL (ref 1–4.8)
LYMPHOCYTES RELATIVE PERCENT: 25.9 %
MCH RBC QN AUTO: 29 PG (ref 27–31.3)
MCHC RBC AUTO-ENTMCNC: 33.7 % (ref 33–37)
MCV RBC AUTO: 86.2 FL (ref 82–100)
MONOCYTES ABSOLUTE: 0.5 K/UL (ref 0.2–0.8)
MONOCYTES RELATIVE PERCENT: 7 %
NEUTROPHILS ABSOLUTE: 4.8 K/UL (ref 1.4–6.5)
NEUTROPHILS RELATIVE PERCENT: 62.3 %
PDW BLD-RTO: 14.5 % (ref 11.5–14.5)
PERFORMED ON: ABNORMAL
PLATELET # BLD: 198 K/UL (ref 130–400)
POTASSIUM SERPL-SCNC: 4.8 MEQ/L (ref 3.4–4.9)
RBC # BLD: 3.08 M/UL (ref 4.2–5.4)
SODIUM BLD-SCNC: 146 MEQ/L (ref 135–144)
WBC # BLD: 7.6 K/UL (ref 4.8–10.8)

## 2019-11-23 PROCEDURE — 2580000003 HC RX 258: Performed by: PHYSICIAN ASSISTANT

## 2019-11-23 PROCEDURE — 2580000003 HC RX 258: Performed by: INTERNAL MEDICINE

## 2019-11-23 PROCEDURE — 1210000000 HC MED SURG R&B

## 2019-11-23 PROCEDURE — 6370000000 HC RX 637 (ALT 250 FOR IP): Performed by: INTERNAL MEDICINE

## 2019-11-23 PROCEDURE — 36415 COLL VENOUS BLD VENIPUNCTURE: CPT

## 2019-11-23 PROCEDURE — 93010 ELECTROCARDIOGRAM REPORT: CPT | Performed by: INTERNAL MEDICINE

## 2019-11-23 PROCEDURE — 85025 COMPLETE CBC W/AUTO DIFF WBC: CPT

## 2019-11-23 PROCEDURE — 80048 BASIC METABOLIC PNL TOTAL CA: CPT

## 2019-11-23 RX ORDER — 0.9 % SODIUM CHLORIDE 0.9 %
500 INTRAVENOUS SOLUTION INTRAVENOUS ONCE
Status: COMPLETED | OUTPATIENT
Start: 2019-11-23 | End: 2019-11-23

## 2019-11-23 RX ADMIN — INSULIN LISPRO 1 UNITS: 100 INJECTION, SOLUTION INTRAVENOUS; SUBCUTANEOUS at 22:43

## 2019-11-23 RX ADMIN — SODIUM CHLORIDE 500 ML: 9 INJECTION, SOLUTION INTRAVENOUS at 10:31

## 2019-11-23 RX ADMIN — HEPARIN SODIUM 5000 UNITS: 5000 INJECTION, SOLUTION INTRAVENOUS; SUBCUTANEOUS at 22:42

## 2019-11-23 RX ADMIN — HEPARIN SODIUM 5000 UNITS: 5000 INJECTION, SOLUTION INTRAVENOUS; SUBCUTANEOUS at 00:26

## 2019-11-23 RX ADMIN — CARVEDILOL 6.25 MG: 6.25 TABLET, FILM COATED ORAL at 16:54

## 2019-11-23 RX ADMIN — ATORVASTATIN CALCIUM 40 MG: 40 TABLET, FILM COATED ORAL at 22:42

## 2019-11-23 RX ADMIN — SODIUM CHLORIDE: 9 INJECTION, SOLUTION INTRAVENOUS at 00:30

## 2019-11-23 RX ADMIN — INSULIN GLARGINE 15 UNITS: 100 INJECTION, SOLUTION SUBCUTANEOUS at 22:43

## 2019-11-23 RX ADMIN — CLOPIDOGREL BISULFATE 75 MG: 75 TABLET ORAL at 10:23

## 2019-11-23 RX ADMIN — ACETAMINOPHEN 650 MG: 325 TABLET ORAL at 22:42

## 2019-11-23 RX ADMIN — HEPARIN SODIUM 5000 UNITS: 5000 INJECTION, SOLUTION INTRAVENOUS; SUBCUTANEOUS at 16:53

## 2019-11-23 RX ADMIN — HEPARIN SODIUM 5000 UNITS: 5000 INJECTION, SOLUTION INTRAVENOUS; SUBCUTANEOUS at 10:23

## 2019-11-23 RX ADMIN — Medication 10 ML: at 10:23

## 2019-11-23 RX ADMIN — CARVEDILOL 6.25 MG: 6.25 TABLET, FILM COATED ORAL at 10:23

## 2019-11-23 RX ADMIN — INSULIN GLARGINE 15 UNITS: 100 INJECTION, SOLUTION SUBCUTANEOUS at 00:27

## 2019-11-23 RX ADMIN — ISOSORBIDE MONONITRATE 60 MG: 60 TABLET, EXTENDED RELEASE ORAL at 10:23

## 2019-11-23 ASSESSMENT — PAIN SCALES - GENERAL
PAINLEVEL_OUTOF10: 4
PAINLEVEL_OUTOF10: 10
PAINLEVEL_OUTOF10: 3

## 2019-11-24 LAB
ANION GAP SERPL CALCULATED.3IONS-SCNC: 11 MEQ/L (ref 9–15)
BASOPHILS ABSOLUTE: 0 K/UL (ref 0–0.2)
BASOPHILS RELATIVE PERCENT: 0.7 %
BUN BLDV-MCNC: 58 MG/DL (ref 8–23)
CALCIUM SERPL-MCNC: 7.8 MG/DL (ref 8.5–9.9)
CHLORIDE BLD-SCNC: 118 MEQ/L (ref 95–107)
CO2: 16 MEQ/L (ref 20–31)
CREAT SERPL-MCNC: 3.55 MG/DL (ref 0.5–0.9)
EOSINOPHILS ABSOLUTE: 0.3 K/UL (ref 0–0.7)
EOSINOPHILS RELATIVE PERCENT: 4.7 %
GFR AFRICAN AMERICAN: 15.8
GFR NON-AFRICAN AMERICAN: 13
GLUCOSE BLD-MCNC: 148 MG/DL (ref 60–115)
GLUCOSE BLD-MCNC: 210 MG/DL (ref 60–115)
GLUCOSE BLD-MCNC: 248 MG/DL (ref 60–115)
GLUCOSE BLD-MCNC: 249 MG/DL (ref 60–115)
GLUCOSE BLD-MCNC: 90 MG/DL (ref 70–99)
HCT VFR BLD CALC: 22.5 % (ref 37–47)
HEMOGLOBIN: 7.6 G/DL (ref 12–16)
LYMPHOCYTES ABSOLUTE: 1.5 K/UL (ref 1–4.8)
LYMPHOCYTES RELATIVE PERCENT: 24.8 %
MCH RBC QN AUTO: 29.2 PG (ref 27–31.3)
MCHC RBC AUTO-ENTMCNC: 33.6 % (ref 33–37)
MCV RBC AUTO: 86.7 FL (ref 82–100)
MONOCYTES ABSOLUTE: 0.5 K/UL (ref 0.2–0.8)
MONOCYTES RELATIVE PERCENT: 8.2 %
NEUTROPHILS ABSOLUTE: 3.7 K/UL (ref 1.4–6.5)
NEUTROPHILS RELATIVE PERCENT: 61.6 %
PDW BLD-RTO: 14.9 % (ref 11.5–14.5)
PERFORMED ON: ABNORMAL
PLATELET # BLD: 163 K/UL (ref 130–400)
POTASSIUM SERPL-SCNC: 4.1 MEQ/L (ref 3.4–4.9)
RBC # BLD: 2.59 M/UL (ref 4.2–5.4)
SODIUM BLD-SCNC: 145 MEQ/L (ref 135–144)
WBC # BLD: 6 K/UL (ref 4.8–10.8)

## 2019-11-24 PROCEDURE — 2500000003 HC RX 250 WO HCPCS: Performed by: PHYSICIAN ASSISTANT

## 2019-11-24 PROCEDURE — 2580000003 HC RX 258: Performed by: PHYSICIAN ASSISTANT

## 2019-11-24 PROCEDURE — 6360000002 HC RX W HCPCS: Performed by: INTERNAL MEDICINE

## 2019-11-24 PROCEDURE — 80048 BASIC METABOLIC PNL TOTAL CA: CPT

## 2019-11-24 PROCEDURE — 6370000000 HC RX 637 (ALT 250 FOR IP): Performed by: PHYSICIAN ASSISTANT

## 2019-11-24 PROCEDURE — 85025 COMPLETE CBC W/AUTO DIFF WBC: CPT

## 2019-11-24 PROCEDURE — 6370000000 HC RX 637 (ALT 250 FOR IP): Performed by: INTERNAL MEDICINE

## 2019-11-24 PROCEDURE — 1210000000 HC MED SURG R&B

## 2019-11-24 PROCEDURE — 2580000003 HC RX 258: Performed by: INTERNAL MEDICINE

## 2019-11-24 PROCEDURE — 36415 COLL VENOUS BLD VENIPUNCTURE: CPT

## 2019-11-24 RX ORDER — PANTOPRAZOLE SODIUM 40 MG/1
40 TABLET, DELAYED RELEASE ORAL
Status: DISCONTINUED | OUTPATIENT
Start: 2019-11-24 | End: 2019-11-25 | Stop reason: HOSPADM

## 2019-11-24 RX ORDER — NITROGLYCERIN 0.4 MG/1
0.4 TABLET SUBLINGUAL EVERY 5 MIN PRN
Status: DISCONTINUED | OUTPATIENT
Start: 2019-11-24 | End: 2019-11-25 | Stop reason: HOSPADM

## 2019-11-24 RX ORDER — NITROFURANTOIN MACROCRYSTALS 50 MG/1
100 CAPSULE ORAL EVERY 12 HOURS SCHEDULED
Status: DISCONTINUED | OUTPATIENT
Start: 2019-11-24 | End: 2019-11-24

## 2019-11-24 RX ORDER — ARIPIPRAZOLE 5 MG/1
5 TABLET ORAL DAILY
Status: DISCONTINUED | OUTPATIENT
Start: 2019-11-24 | End: 2019-11-25 | Stop reason: HOSPADM

## 2019-11-24 RX ORDER — SODIUM CHLORIDE, SODIUM LACTATE, POTASSIUM CHLORIDE, CALCIUM CHLORIDE 600; 310; 30; 20 MG/100ML; MG/100ML; MG/100ML; MG/100ML
INJECTION, SOLUTION INTRAVENOUS CONTINUOUS
Status: DISCONTINUED | OUTPATIENT
Start: 2019-11-24 | End: 2019-11-25

## 2019-11-24 RX ORDER — ASPIRIN 81 MG/1
81 TABLET ORAL DAILY
Status: DISCONTINUED | OUTPATIENT
Start: 2019-11-24 | End: 2019-11-25 | Stop reason: HOSPADM

## 2019-11-24 RX ORDER — PREGABALIN 75 MG/1
75 CAPSULE ORAL DAILY
Status: DISCONTINUED | OUTPATIENT
Start: 2019-11-24 | End: 2019-11-25 | Stop reason: HOSPADM

## 2019-11-24 RX ORDER — TRAZODONE HYDROCHLORIDE 50 MG/1
50 TABLET ORAL NIGHTLY
Status: DISCONTINUED | OUTPATIENT
Start: 2019-11-24 | End: 2019-11-25 | Stop reason: HOSPADM

## 2019-11-24 RX ORDER — CARVEDILOL 6.25 MG/1
6.25 TABLET ORAL 2 TIMES DAILY
Status: DISCONTINUED | OUTPATIENT
Start: 2019-11-24 | End: 2019-11-24 | Stop reason: SDUPTHER

## 2019-11-24 RX ORDER — CLOPIDOGREL BISULFATE 75 MG/1
1 TABLET ORAL DAILY
Status: DISCONTINUED | OUTPATIENT
Start: 2019-11-24 | End: 2019-11-24 | Stop reason: SDUPTHER

## 2019-11-24 RX ORDER — LANOLIN ALCOHOL/MO/W.PET/CERES
400 CREAM (GRAM) TOPICAL DAILY
Status: DISCONTINUED | OUTPATIENT
Start: 2019-11-24 | End: 2019-11-25 | Stop reason: HOSPADM

## 2019-11-24 RX ORDER — ATORVASTATIN CALCIUM 40 MG/1
40 TABLET, FILM COATED ORAL DAILY
Status: DISCONTINUED | OUTPATIENT
Start: 2019-11-24 | End: 2019-11-24 | Stop reason: SDUPTHER

## 2019-11-24 RX ORDER — UBIDECARENONE 75 MG
100 CAPSULE ORAL DAILY
Status: DISCONTINUED | OUTPATIENT
Start: 2019-11-24 | End: 2019-11-25 | Stop reason: HOSPADM

## 2019-11-24 RX ORDER — VITAMIN B COMPLEX
1000 TABLET ORAL DAILY
Status: DISCONTINUED | OUTPATIENT
Start: 2019-11-24 | End: 2019-11-25 | Stop reason: HOSPADM

## 2019-11-24 RX ORDER — ISOSORBIDE MONONITRATE 60 MG/1
1 TABLET, EXTENDED RELEASE ORAL DAILY
Status: DISCONTINUED | OUTPATIENT
Start: 2019-11-24 | End: 2019-11-24 | Stop reason: SDUPTHER

## 2019-11-24 RX ORDER — LANOLIN ALCOHOL/MO/W.PET/CERES
3 CREAM (GRAM) TOPICAL NIGHTLY PRN
Status: DISCONTINUED | OUTPATIENT
Start: 2019-11-24 | End: 2019-11-25 | Stop reason: HOSPADM

## 2019-11-24 RX ORDER — GUAIFENESIN 100 MG/5ML
200 SOLUTION ORAL EVERY 4 HOURS PRN
Status: DISCONTINUED | OUTPATIENT
Start: 2019-11-24 | End: 2019-11-25 | Stop reason: HOSPADM

## 2019-11-24 RX ORDER — ACETAMINOPHEN 325 MG/1
650 TABLET ORAL EVERY 6 HOURS PRN
Status: DISCONTINUED | OUTPATIENT
Start: 2019-11-24 | End: 2019-11-24 | Stop reason: SDUPTHER

## 2019-11-24 RX ADMIN — ACETAMINOPHEN 650 MG: 325 TABLET ORAL at 22:20

## 2019-11-24 RX ADMIN — HEPARIN SODIUM 5000 UNITS: 5000 INJECTION, SOLUTION INTRAVENOUS; SUBCUTANEOUS at 22:19

## 2019-11-24 RX ADMIN — ISOSORBIDE MONONITRATE 60 MG: 60 TABLET, EXTENDED RELEASE ORAL at 10:41

## 2019-11-24 RX ADMIN — MAGNESIUM OXIDE TAB 400 MG (241.3 MG ELEMENTAL MG) 400 MG: 400 (241.3 MG) TAB at 10:41

## 2019-11-24 RX ADMIN — INSULIN LISPRO 1 UNITS: 100 INJECTION, SOLUTION INTRAVENOUS; SUBCUTANEOUS at 22:20

## 2019-11-24 RX ADMIN — Medication 10 ML: at 23:37

## 2019-11-24 RX ADMIN — INSULIN GLARGINE 15 UNITS: 100 INJECTION, SOLUTION SUBCUTANEOUS at 22:21

## 2019-11-24 RX ADMIN — CLOPIDOGREL BISULFATE 75 MG: 75 TABLET ORAL at 10:41

## 2019-11-24 RX ADMIN — ACETAMINOPHEN 650 MG: 325 TABLET ORAL at 04:07

## 2019-11-24 RX ADMIN — LINAGLIPTIN 5 MG: 5 TABLET, FILM COATED ORAL at 10:41

## 2019-11-24 RX ADMIN — HEPARIN SODIUM 5000 UNITS: 5000 INJECTION, SOLUTION INTRAVENOUS; SUBCUTANEOUS at 10:40

## 2019-11-24 RX ADMIN — VITAMIN D, TAB 1000IU (100/BT) 1000 UNITS: 25 TAB at 10:40

## 2019-11-24 RX ADMIN — MICONAZOLE NITRATE: 20 POWDER TOPICAL at 22:21

## 2019-11-24 RX ADMIN — PREGABALIN 75 MG: 75 CAPSULE ORAL at 10:40

## 2019-11-24 RX ADMIN — CARVEDILOL 6.25 MG: 6.25 TABLET, FILM COATED ORAL at 10:41

## 2019-11-24 RX ADMIN — TRAZODONE HYDROCHLORIDE 50 MG: 50 TABLET ORAL at 22:20

## 2019-11-24 RX ADMIN — PANTOPRAZOLE SODIUM 40 MG: 40 TABLET, DELAYED RELEASE ORAL at 16:59

## 2019-11-24 RX ADMIN — SERTRALINE HYDROCHLORIDE 50 MG: 50 TABLET ORAL at 10:41

## 2019-11-24 RX ADMIN — PANTOPRAZOLE SODIUM 40 MG: 40 TABLET, DELAYED RELEASE ORAL at 10:41

## 2019-11-24 RX ADMIN — CARVEDILOL 6.25 MG: 6.25 TABLET, FILM COATED ORAL at 16:59

## 2019-11-24 RX ADMIN — ASPIRIN 81 MG: 81 TABLET, COATED ORAL at 10:40

## 2019-11-24 RX ADMIN — DARBEPOETIN ALFA 40 MCG: 40 SOLUTION INTRAVENOUS; SUBCUTANEOUS at 15:21

## 2019-11-24 RX ADMIN — SODIUM CHLORIDE, POTASSIUM CHLORIDE, SODIUM LACTATE AND CALCIUM CHLORIDE: 600; 310; 30; 20 INJECTION, SOLUTION INTRAVENOUS at 11:54

## 2019-11-24 RX ADMIN — VITAM B12 100 MCG: 100 TAB at 10:41

## 2019-11-24 RX ADMIN — ATORVASTATIN CALCIUM 40 MG: 40 TABLET, FILM COATED ORAL at 22:20

## 2019-11-24 RX ADMIN — HEPARIN SODIUM 5000 UNITS: 5000 INJECTION, SOLUTION INTRAVENOUS; SUBCUTANEOUS at 16:59

## 2019-11-24 RX ADMIN — SODIUM CHLORIDE: 9 INJECTION, SOLUTION INTRAVENOUS at 04:03

## 2019-11-24 RX ADMIN — Medication 10 ML: at 10:40

## 2019-11-24 RX ADMIN — ARIPIPRAZOLE 5 MG: 5 TABLET ORAL at 10:41

## 2019-11-24 ASSESSMENT — PAIN SCALES - GENERAL
PAINLEVEL_OUTOF10: 6
PAINLEVEL_OUTOF10: 7

## 2019-11-25 VITALS
DIASTOLIC BLOOD PRESSURE: 68 MMHG | RESPIRATION RATE: 16 BRPM | OXYGEN SATURATION: 97 % | HEART RATE: 72 BPM | SYSTOLIC BLOOD PRESSURE: 167 MMHG | TEMPERATURE: 97.7 F | BODY MASS INDEX: 37.98 KG/M2 | WEIGHT: 242 LBS | HEIGHT: 67 IN

## 2019-11-25 LAB
AMPHETAMINE SCREEN, URINE: ABNORMAL
ANION GAP SERPL CALCULATED.3IONS-SCNC: 11 MEQ/L (ref 9–15)
BACTERIA: ABNORMAL /HPF
BARBITURATE SCREEN URINE: ABNORMAL
BASOPHILS ABSOLUTE: 0 K/UL (ref 0–0.2)
BASOPHILS RELATIVE PERCENT: 0.5 %
BENZODIAZEPINE SCREEN, URINE: ABNORMAL
BILIRUBIN URINE: NEGATIVE
BLOOD, URINE: ABNORMAL
BUN BLDV-MCNC: 50 MG/DL (ref 8–23)
CALCIUM SERPL-MCNC: 9.6 MG/DL (ref 8.5–9.9)
CANNABINOID SCREEN URINE: ABNORMAL
CHLORIDE BLD-SCNC: 110 MEQ/L (ref 95–107)
CLARITY: CLEAR
CO2: 17 MEQ/L (ref 20–31)
COCAINE METABOLITE SCREEN URINE: ABNORMAL
COLOR: YELLOW
CREAT SERPL-MCNC: 3 MG/DL (ref 0.5–0.9)
CREATININE URINE: 68.4 MG/DL
EOSINOPHILS ABSOLUTE: 0.3 K/UL (ref 0–0.7)
EOSINOPHILS RELATIVE PERCENT: 4.2 %
EPITHELIAL CELLS, UA: ABNORMAL /HPF (ref 0–5)
GFR AFRICAN AMERICAN: 19.1
GFR NON-AFRICAN AMERICAN: 15.8
GLUCOSE BLD-MCNC: 197 MG/DL (ref 70–99)
GLUCOSE BLD-MCNC: 217 MG/DL (ref 60–115)
GLUCOSE BLD-MCNC: 325 MG/DL (ref 60–115)
GLUCOSE URINE: 250 MG/DL
HCT VFR BLD CALC: 26.6 % (ref 37–47)
HEMOGLOBIN: 8.6 G/DL (ref 12–16)
HYALINE CASTS: ABNORMAL /HPF (ref 0–5)
KETONES, URINE: NEGATIVE MG/DL
LEUKOCYTE ESTERASE, URINE: NEGATIVE
LYMPHOCYTES ABSOLUTE: 1.7 K/UL (ref 1–4.8)
LYMPHOCYTES RELATIVE PERCENT: 26 %
Lab: ABNORMAL
MCH RBC QN AUTO: 28.5 PG (ref 27–31.3)
MCHC RBC AUTO-ENTMCNC: 32.5 % (ref 33–37)
MCV RBC AUTO: 87.8 FL (ref 82–100)
METHADONE SCREEN, URINE: ABNORMAL
MONOCYTES ABSOLUTE: 0.5 K/UL (ref 0.2–0.8)
MONOCYTES RELATIVE PERCENT: 7 %
NEUTROPHILS ABSOLUTE: 4 K/UL (ref 1.4–6.5)
NEUTROPHILS RELATIVE PERCENT: 62.3 %
NITRITE, URINE: NEGATIVE
OPIATE SCREEN URINE: ABNORMAL
OXYCODONE URINE: POSITIVE
PDW BLD-RTO: 14.5 % (ref 11.5–14.5)
PERFORMED ON: ABNORMAL
PERFORMED ON: ABNORMAL
PH UA: 5 (ref 5–9)
PHENCYCLIDINE SCREEN URINE: ABNORMAL
PLATELET # BLD: 184 K/UL (ref 130–400)
POTASSIUM SERPL-SCNC: 4.7 MEQ/L (ref 3.4–4.9)
PROPOXYPHENE SCREEN: ABNORMAL
PROTEIN UA: >=300 MG/DL
RBC # BLD: 3.03 M/UL (ref 4.2–5.4)
RBC UA: ABNORMAL /HPF (ref 0–5)
SODIUM BLD-SCNC: 138 MEQ/L (ref 135–144)
SODIUM URINE: 70 MEQ/L
SPECIFIC GRAVITY UA: 1.01 (ref 1–1.03)
UREA NITROGEN, UR: 558 MG/DL
URINE REFLEX TO CULTURE: YES
UROBILINOGEN, URINE: 0.2 E.U./DL
WBC # BLD: 6.5 K/UL (ref 4.8–10.8)
WBC UA: ABNORMAL /HPF (ref 0–5)

## 2019-11-25 PROCEDURE — 6370000000 HC RX 637 (ALT 250 FOR IP): Performed by: INTERNAL MEDICINE

## 2019-11-25 PROCEDURE — 6370000000 HC RX 637 (ALT 250 FOR IP): Performed by: PHYSICIAN ASSISTANT

## 2019-11-25 PROCEDURE — 2580000003 HC RX 258: Performed by: PHYSICIAN ASSISTANT

## 2019-11-25 PROCEDURE — 2580000003 HC RX 258: Performed by: INTERNAL MEDICINE

## 2019-11-25 PROCEDURE — 36415 COLL VENOUS BLD VENIPUNCTURE: CPT

## 2019-11-25 PROCEDURE — 81001 URINALYSIS AUTO W/SCOPE: CPT

## 2019-11-25 PROCEDURE — 87086 URINE CULTURE/COLONY COUNT: CPT

## 2019-11-25 PROCEDURE — 80048 BASIC METABOLIC PNL TOTAL CA: CPT

## 2019-11-25 PROCEDURE — 87186 SC STD MICRODIL/AGAR DIL: CPT

## 2019-11-25 PROCEDURE — 93010 ELECTROCARDIOGRAM REPORT: CPT | Performed by: INTERNAL MEDICINE

## 2019-11-25 PROCEDURE — 87077 CULTURE AEROBIC IDENTIFY: CPT

## 2019-11-25 PROCEDURE — 85025 COMPLETE CBC W/AUTO DIFF WBC: CPT

## 2019-11-25 RX ORDER — ISOSORBIDE MONONITRATE 120 MG/1
120 TABLET, EXTENDED RELEASE ORAL DAILY
Status: DISCONTINUED | OUTPATIENT
Start: 2019-11-26 | End: 2019-11-25 | Stop reason: HOSPADM

## 2019-11-25 RX ORDER — NITROFURANTOIN MACROCRYSTALS 50 MG/1
50 CAPSULE ORAL DAILY
Qty: 90 CAPSULE | Refills: 11 | Status: SHIPPED | OUTPATIENT
Start: 2019-11-25 | End: 2019-11-25 | Stop reason: HOSPADM

## 2019-11-25 RX ORDER — INSULIN LISPRO 100 [IU]/ML
2 INJECTION, SOLUTION INTRAVENOUS; SUBCUTANEOUS
Qty: 1 PEN | Refills: 0 | DISCHARGE
Start: 2019-11-25 | End: 2019-12-27

## 2019-11-25 RX ORDER — ISOSORBIDE MONONITRATE 60 MG/1
60 TABLET, EXTENDED RELEASE ORAL ONCE
Status: COMPLETED | OUTPATIENT
Start: 2019-11-25 | End: 2019-11-25

## 2019-11-25 RX ORDER — INSULIN GLARGINE 100 [IU]/ML
15 INJECTION, SOLUTION SUBCUTANEOUS NIGHTLY
Qty: 1 VIAL | Refills: 3 | DISCHARGE
Start: 2019-11-25 | End: 2019-12-27 | Stop reason: ALTCHOICE

## 2019-11-25 RX ORDER — NALOXONE HYDROCHLORIDE 0.4 MG/ML
0.4 INJECTION, SOLUTION INTRAMUSCULAR; INTRAVENOUS; SUBCUTANEOUS PRN
DISCHARGE
Start: 2019-11-25 | End: 2019-12-27 | Stop reason: ALTCHOICE

## 2019-11-25 RX ORDER — ISOSORBIDE MONONITRATE 120 MG/1
120 TABLET, EXTENDED RELEASE ORAL DAILY
Qty: 30 TABLET | Refills: 3 | DISCHARGE
Start: 2019-11-26 | End: 2019-12-27 | Stop reason: ALTCHOICE

## 2019-11-25 RX ORDER — HEPARIN SODIUM 5000 [USP'U]/ML
5000 INJECTION, SOLUTION INTRAVENOUS; SUBCUTANEOUS EVERY 8 HOURS SCHEDULED
DISCHARGE
Start: 2019-11-25 | End: 2019-12-27 | Stop reason: ALTCHOICE

## 2019-11-25 RX ADMIN — SODIUM CHLORIDE, POTASSIUM CHLORIDE, SODIUM LACTATE AND CALCIUM CHLORIDE: 600; 310; 30; 20 INJECTION, SOLUTION INTRAVENOUS at 02:38

## 2019-11-25 RX ADMIN — MICONAZOLE NITRATE: 20 POWDER TOPICAL at 09:11

## 2019-11-25 RX ADMIN — ISOSORBIDE MONONITRATE 60 MG: 60 TABLET, EXTENDED RELEASE ORAL at 09:12

## 2019-11-25 RX ADMIN — MAGNESIUM OXIDE TAB 400 MG (241.3 MG ELEMENTAL MG) 400 MG: 400 (241.3 MG) TAB at 09:12

## 2019-11-25 RX ADMIN — CARVEDILOL 6.25 MG: 6.25 TABLET, FILM COATED ORAL at 09:12

## 2019-11-25 RX ADMIN — SERTRALINE HYDROCHLORIDE 50 MG: 50 TABLET ORAL at 09:12

## 2019-11-25 RX ADMIN — HEPARIN SODIUM 5000 UNITS: 5000 INJECTION, SOLUTION INTRAVENOUS; SUBCUTANEOUS at 09:11

## 2019-11-25 RX ADMIN — ISOSORBIDE MONONITRATE 60 MG: 60 TABLET, EXTENDED RELEASE ORAL at 11:27

## 2019-11-25 RX ADMIN — LINAGLIPTIN 5 MG: 5 TABLET, FILM COATED ORAL at 09:12

## 2019-11-25 RX ADMIN — VITAMIN D, TAB 1000IU (100/BT) 1000 UNITS: 25 TAB at 09:17

## 2019-11-25 RX ADMIN — CLOPIDOGREL BISULFATE 75 MG: 75 TABLET ORAL at 09:12

## 2019-11-25 RX ADMIN — PREGABALIN 75 MG: 75 CAPSULE ORAL at 09:12

## 2019-11-25 RX ADMIN — ASPIRIN 81 MG: 81 TABLET, COATED ORAL at 09:12

## 2019-11-25 RX ADMIN — PANTOPRAZOLE SODIUM 40 MG: 40 TABLET, DELAYED RELEASE ORAL at 06:41

## 2019-11-25 RX ADMIN — VITAM B12 100 MCG: 100 TAB at 09:12

## 2019-11-25 RX ADMIN — ARIPIPRAZOLE 5 MG: 5 TABLET ORAL at 09:12

## 2019-11-25 RX ADMIN — Medication 10 ML: at 09:18

## 2019-11-26 ENCOUNTER — CARE COORDINATION (OUTPATIENT)
Dept: CASE MANAGEMENT | Age: 59
End: 2019-11-26

## 2019-11-26 DIAGNOSIS — E11.40 CHRONIC PAINFUL DIABETIC NEUROPATHY (HCC): ICD-10-CM

## 2019-11-26 RX ORDER — PREGABALIN 75 MG/1
CAPSULE ORAL
Qty: 60 CAPSULE | Refills: 1 | OUTPATIENT
Start: 2019-11-26 | End: 2019-12-28

## 2019-11-27 LAB
ORGANISM: ABNORMAL
URINE CULTURE, ROUTINE: ABNORMAL

## 2019-11-29 ENCOUNTER — OFFICE VISIT (OUTPATIENT)
Dept: GERIATRIC MEDICINE | Age: 59
End: 2019-11-29
Payer: MEDICARE

## 2019-11-29 DIAGNOSIS — S42.411D CLOSED SUPRACONDYLAR FRACTURE OF RIGHT HUMERUS WITH ROUTINE HEALING, SUBSEQUENT ENCOUNTER: ICD-10-CM

## 2019-11-29 DIAGNOSIS — E86.0 DEHYDRATION: ICD-10-CM

## 2019-11-29 DIAGNOSIS — R35.0 URINARY FREQUENCY: Primary | ICD-10-CM

## 2019-11-29 DIAGNOSIS — R29.6 FREQUENT FALLS: ICD-10-CM

## 2019-11-29 PROCEDURE — 99309 SBSQ NF CARE MODERATE MDM 30: CPT | Performed by: NURSE PRACTITIONER

## 2019-11-29 PROCEDURE — G8482 FLU IMMUNIZE ORDER/ADMIN: HCPCS | Performed by: NURSE PRACTITIONER

## 2019-12-02 ENCOUNTER — OFFICE VISIT (OUTPATIENT)
Dept: GERIATRIC MEDICINE | Age: 59
End: 2019-12-02
Payer: MEDICARE

## 2019-12-02 ENCOUNTER — HOSPITAL ENCOUNTER (OUTPATIENT)
Dept: PULMONOLOGY | Age: 59
Discharge: HOME | End: 2019-12-02
Payer: COMMERCIAL

## 2019-12-02 VITALS
TEMPERATURE: 98.5 F | OXYGEN SATURATION: 98 % | WEIGHT: 244 LBS | BODY MASS INDEX: 38.3 KG/M2 | SYSTOLIC BLOOD PRESSURE: 163 MMHG | RESPIRATION RATE: 16 BRPM | HEIGHT: 67 IN | DIASTOLIC BLOOD PRESSURE: 92 MMHG | HEART RATE: 75 BPM

## 2019-12-02 DIAGNOSIS — S42.351D CLOSED DISPLACED COMMINUTED FRACTURE OF SHAFT OF RIGHT HUMERUS WITH ROUTINE HEALING, SUBSEQUENT ENCOUNTER: ICD-10-CM

## 2019-12-02 DIAGNOSIS — R06.02 SHORTNESS OF BREATH: ICD-10-CM

## 2019-12-02 DIAGNOSIS — I10 UNCONTROLLED HYPERTENSION: ICD-10-CM

## 2019-12-02 DIAGNOSIS — M79.601 ARM PAIN, RIGHT: Primary | ICD-10-CM

## 2019-12-02 PROCEDURE — 6360000002 HC RX W HCPCS: Performed by: INTERNAL MEDICINE

## 2019-12-02 PROCEDURE — 3044F HG A1C LEVEL LT 7.0%: CPT | Performed by: INTERNAL MEDICINE

## 2019-12-02 PROCEDURE — 99305 1ST NF CARE MODERATE MDM 35: CPT | Performed by: INTERNAL MEDICINE

## 2019-12-02 PROCEDURE — 94060 EVALUATION OF WHEEZING: CPT | Performed by: INTERNAL MEDICINE

## 2019-12-02 PROCEDURE — 94726 PLETHYSMOGRAPHY LUNG VOLUMES: CPT

## 2019-12-02 PROCEDURE — 94726 PLETHYSMOGRAPHY LUNG VOLUMES: CPT | Performed by: INTERNAL MEDICINE

## 2019-12-02 PROCEDURE — 94729 DIFFUSING CAPACITY: CPT

## 2019-12-02 PROCEDURE — G8482 FLU IMMUNIZE ORDER/ADMIN: HCPCS | Performed by: INTERNAL MEDICINE

## 2019-12-02 PROCEDURE — 94729 DIFFUSING CAPACITY: CPT | Performed by: INTERNAL MEDICINE

## 2019-12-02 PROCEDURE — 94060 EVALUATION OF WHEEZING: CPT

## 2019-12-02 RX ORDER — ALBUTEROL SULFATE 2.5 MG/3ML
2.5 SOLUTION RESPIRATORY (INHALATION) ONCE
Status: COMPLETED | OUTPATIENT
Start: 2019-12-02 | End: 2019-12-02

## 2019-12-02 RX ADMIN — ALBUTEROL SULFATE 2.5 MG: 2.5 SOLUTION RESPIRATORY (INHALATION) at 12:19

## 2019-12-03 ENCOUNTER — OFFICE VISIT (OUTPATIENT)
Dept: PULMONOLOGY | Age: 59
End: 2019-12-03
Payer: MEDICARE

## 2019-12-03 VITALS
HEIGHT: 67 IN | DIASTOLIC BLOOD PRESSURE: 60 MMHG | HEART RATE: 65 BPM | WEIGHT: 237 LBS | BODY MASS INDEX: 37.2 KG/M2 | TEMPERATURE: 99 F | RESPIRATION RATE: 16 BRPM | SYSTOLIC BLOOD PRESSURE: 132 MMHG | OXYGEN SATURATION: 96 %

## 2019-12-03 DIAGNOSIS — R06.02 SHORTNESS OF BREATH: Primary | ICD-10-CM

## 2019-12-03 DIAGNOSIS — I27.20 PULMONARY HYPERTENSION (HCC): ICD-10-CM

## 2019-12-03 DIAGNOSIS — G47.30 SLEEP APNEA, UNSPECIFIED TYPE: ICD-10-CM

## 2019-12-03 DIAGNOSIS — E66.9 OBESITY (BMI 30-39.9): ICD-10-CM

## 2019-12-03 PROCEDURE — 3017F COLORECTAL CA SCREEN DOC REV: CPT | Performed by: INTERNAL MEDICINE

## 2019-12-03 PROCEDURE — 1111F DSCHRG MED/CURRENT MED MERGE: CPT | Performed by: INTERNAL MEDICINE

## 2019-12-03 PROCEDURE — G8598 ASA/ANTIPLAT THER USED: HCPCS | Performed by: INTERNAL MEDICINE

## 2019-12-03 PROCEDURE — G8417 CALC BMI ABV UP PARAM F/U: HCPCS | Performed by: INTERNAL MEDICINE

## 2019-12-03 PROCEDURE — 99214 OFFICE O/P EST MOD 30 MIN: CPT | Performed by: INTERNAL MEDICINE

## 2019-12-03 PROCEDURE — G8427 DOCREV CUR MEDS BY ELIG CLIN: HCPCS | Performed by: INTERNAL MEDICINE

## 2019-12-03 PROCEDURE — G8482 FLU IMMUNIZE ORDER/ADMIN: HCPCS | Performed by: INTERNAL MEDICINE

## 2019-12-03 PROCEDURE — 1036F TOBACCO NON-USER: CPT | Performed by: INTERNAL MEDICINE

## 2019-12-03 RX ORDER — AMLODIPINE BESYLATE 2.5 MG/1
2.5 TABLET ORAL DAILY
COMMUNITY
End: 2019-12-27

## 2019-12-03 ASSESSMENT — ENCOUNTER SYMPTOMS
EYE REDNESS: 0
CHOKING: 0
CONSTIPATION: 0
TROUBLE SWALLOWING: 0
STRIDOR: 0
TROUBLE SWALLOWING: 0
VOICE CHANGE: 0
SINUS PRESSURE: 0
WHEEZING: 0
ABDOMINAL PAIN: 0
RHINORRHEA: 0
BACK PAIN: 0
DIARRHEA: 0
SHORTNESS OF BREATH: 1
CONSTIPATION: 0
ANAL BLEEDING: 0
DIARRHEA: 0
SORE THROAT: 0
VOMITING: 0
COUGH: 0
CHEST TIGHTNESS: 0
CHEST TIGHTNESS: 0
RHINORRHEA: 0
BLOOD IN STOOL: 0
TROUBLE SWALLOWING: 0
VOICE CHANGE: 0
FACIAL SWELLING: 0
SHORTNESS OF BREATH: 0
EYE ITCHING: 0
PHOTOPHOBIA: 0
ABDOMINAL PAIN: 0
SORE THROAT: 0
COLOR CHANGE: 0
EYE DISCHARGE: 0
VOMITING: 0
NAUSEA: 0
SINUS PRESSURE: 0
NAUSEA: 0
ABDOMINAL DISTENTION: 0
ABDOMINAL PAIN: 0
BLOOD IN STOOL: 0
SHORTNESS OF BREATH: 0
EYE ITCHING: 0
PHOTOPHOBIA: 0
EYE DISCHARGE: 0
VOICE CHANGE: 0
COUGH: 0
WHEEZING: 1
COUGH: 0
EYE PAIN: 0
CHEST TIGHTNESS: 0

## 2019-12-04 LAB
BUN BLDV-MCNC: 34 MG/DL
CALCIUM SERPL-MCNC: 9.8 MG/DL
CHLORIDE BLD-SCNC: 103 MMOL/L
CO2: 25 MMOL/L
CREAT SERPL-MCNC: 2.5 MG/DL
GFR CALCULATED: 20
GLUCOSE BLD-MCNC: 314 MG/DL
POTASSIUM SERPL-SCNC: 4.9 MMOL/L
SODIUM BLD-SCNC: 137 MMOL/L

## 2019-12-06 ENCOUNTER — OFFICE VISIT (OUTPATIENT)
Dept: GERIATRIC MEDICINE | Age: 59
End: 2019-12-06
Payer: MEDICARE

## 2019-12-06 DIAGNOSIS — E11.65 UNCONTROLLED TYPE 2 DIABETES MELLITUS WITH HYPERGLYCEMIA (HCC): Primary | ICD-10-CM

## 2019-12-06 DIAGNOSIS — D63.8 ANEMIA, CHRONIC DISEASE: ICD-10-CM

## 2019-12-06 DIAGNOSIS — S42.411D CLOSED SUPRACONDYLAR FRACTURE OF RIGHT HUMERUS WITH ROUTINE HEALING, SUBSEQUENT ENCOUNTER: ICD-10-CM

## 2019-12-06 PROCEDURE — 3044F HG A1C LEVEL LT 7.0%: CPT | Performed by: NURSE PRACTITIONER

## 2019-12-06 PROCEDURE — 99309 SBSQ NF CARE MODERATE MDM 30: CPT | Performed by: NURSE PRACTITIONER

## 2019-12-06 PROCEDURE — G8482 FLU IMMUNIZE ORDER/ADMIN: HCPCS | Performed by: NURSE PRACTITIONER

## 2019-12-10 LAB
BUN BLDV-MCNC: 42 MG/DL
CALCIUM SERPL-MCNC: 9.8 MG/DL
CHLORIDE BLD-SCNC: 108 MMOL/L
CO2: 23 MMOL/L
CREAT SERPL-MCNC: 2.6 MG/DL
GFR CALCULATED: 19
GLUCOSE BLD-MCNC: 110 MG/DL
POTASSIUM SERPL-SCNC: 5.2 MMOL/L
SODIUM BLD-SCNC: 139 MMOL/L

## 2019-12-11 ENCOUNTER — NURSE ONLY (OUTPATIENT)
Dept: GERIATRIC MEDICINE | Age: 59
End: 2019-12-11
Payer: MEDICARE

## 2019-12-11 DIAGNOSIS — I13.0 HYPERTENSIVE HEART AND RENAL DISEASE WITH CONGESTIVE HEART FAILURE (HCC): Primary | ICD-10-CM

## 2019-12-11 LAB
BASOPHILS ABSOLUTE: ABNORMAL
BASOPHILS RELATIVE PERCENT: 0.4 %
BUN BLDV-MCNC: 39 MG/DL
CALCIUM SERPL-MCNC: 9.8 MG/DL
CHLORIDE BLD-SCNC: 106 MMOL/L
CO2: 25 MMOL/L
CREAT SERPL-MCNC: 2.7 MG/DL
EOSINOPHILS ABSOLUTE: 0.4 /ΜL
EOSINOPHILS RELATIVE PERCENT: 5.1 %
GFR CALCULATED: 18
GLUCOSE BLD-MCNC: 141 MG/DL
HCT VFR BLD CALC: 26.5 % (ref 36–46)
HEMOGLOBIN: 9.1 G/DL (ref 12–16)
LYMPHOCYTES ABSOLUTE: 1.9 /ΜL
LYMPHOCYTES RELATIVE PERCENT: 23.9 %
MCH RBC QN AUTO: 29.4 PG
MCHC RBC AUTO-ENTMCNC: 34.4 G/DL
MCV RBC AUTO: 85.6 FL
MONOCYTES ABSOLUTE: 0.6 /ΜL
MONOCYTES RELATIVE PERCENT: 7.6 %
NEUTROPHILS ABSOLUTE: 4.9 /ΜL
NEUTROPHILS RELATIVE PERCENT: 63 %
PLATELET # BLD: 190 K/ΜL
PMV BLD AUTO: 9.9 FL
POTASSIUM SERPL-SCNC: 5.3 MMOL/L
RBC # BLD: 3.1 10^6/ΜL
SODIUM BLD-SCNC: 138 MMOL/L
WBC # BLD: 7.8 10^3/ML

## 2019-12-11 PROCEDURE — G0180 MD CERTIFICATION HHA PATIENT: HCPCS | Performed by: INTERNAL MEDICINE

## 2019-12-13 DIAGNOSIS — E11.40 CHRONIC PAINFUL DIABETIC NEUROPATHY (HCC): ICD-10-CM

## 2019-12-13 DIAGNOSIS — N18.4 CKD (CHRONIC KIDNEY DISEASE) STAGE 4, GFR 15-29 ML/MIN (HCC): Chronic | ICD-10-CM

## 2019-12-13 DIAGNOSIS — I50.30 DIASTOLIC CONGESTIVE HEART FAILURE, UNSPECIFIED HF CHRONICITY (HCC): Primary | ICD-10-CM

## 2019-12-13 DIAGNOSIS — I67.89 CEREBRAL MICROVASCULAR DISEASE: ICD-10-CM

## 2019-12-13 DIAGNOSIS — G93.40 ENCEPHALOPATHY: ICD-10-CM

## 2019-12-13 DIAGNOSIS — I25.119 CORONARY ARTERY DISEASE INVOLVING NATIVE HEART WITH ANGINA PECTORIS, UNSPECIFIED VESSEL OR LESION TYPE (HCC): Chronic | ICD-10-CM

## 2019-12-17 ENCOUNTER — TELEPHONE (OUTPATIENT)
Dept: ENDOCRINOLOGY | Age: 59
End: 2019-12-17

## 2019-12-18 ENCOUNTER — CARE COORDINATION (OUTPATIENT)
Dept: CARE COORDINATION | Age: 59
End: 2019-12-18

## 2019-12-18 LAB
BASOPHILS ABSOLUTE: ABNORMAL
BASOPHILS RELATIVE PERCENT: 0.6 %
BUN BLDV-MCNC: 40 MG/DL
CALCIUM SERPL-MCNC: 10 MG/DL
CHLORIDE BLD-SCNC: 104 MMOL/L
CO2: 23 MMOL/L
CREAT SERPL-MCNC: 2.6 MG/DL
EOSINOPHILS ABSOLUTE: 0.4 /ΜL
EOSINOPHILS RELATIVE PERCENT: 4.6 %
GFR CALCULATED: NORMAL
GLUCOSE BLD-MCNC: 156 MG/DL
HCT VFR BLD CALC: 28.5 % (ref 36–46)
HEMOGLOBIN: 9.7 G/DL (ref 12–16)
LYMPHOCYTES ABSOLUTE: 1.9 /ΜL
LYMPHOCYTES RELATIVE PERCENT: 23.6 %
MCH RBC QN AUTO: 29.1 PG
MCHC RBC AUTO-ENTMCNC: 34 G/DL
MCV RBC AUTO: 85.5 FL
MONOCYTES ABSOLUTE: 0.6 /ΜL
MONOCYTES RELATIVE PERCENT: 7.6 %
NEUTROPHILS ABSOLUTE: 5 /ΜL
NEUTROPHILS RELATIVE PERCENT: 63.6 %
PLATELET # BLD: 186 K/ΜL
PMV BLD AUTO: 9.9 FL
POTASSIUM SERPL-SCNC: 5.1 MMOL/L
RBC # BLD: 3.34 10^6/ΜL
SODIUM BLD-SCNC: 135 MMOL/L
WBC # BLD: 7.9 10^3/ML

## 2019-12-19 ENCOUNTER — OFFICE VISIT (OUTPATIENT)
Dept: GERIATRIC MEDICINE | Age: 59
End: 2019-12-19
Payer: MEDICARE

## 2019-12-19 VITALS
RESPIRATION RATE: 18 BRPM | DIASTOLIC BLOOD PRESSURE: 80 MMHG | SYSTOLIC BLOOD PRESSURE: 142 MMHG | TEMPERATURE: 98.1 F | WEIGHT: 232 LBS | HEART RATE: 72 BPM | BODY MASS INDEX: 38.61 KG/M2

## 2019-12-19 PROCEDURE — G8482 FLU IMMUNIZE ORDER/ADMIN: HCPCS | Performed by: NURSE PRACTITIONER

## 2019-12-19 PROCEDURE — 3044F HG A1C LEVEL LT 7.0%: CPT | Performed by: NURSE PRACTITIONER

## 2019-12-19 PROCEDURE — 99309 SBSQ NF CARE MODERATE MDM 30: CPT | Performed by: NURSE PRACTITIONER

## 2019-12-23 ENCOUNTER — OFFICE VISIT (OUTPATIENT)
Dept: PALLATIVE CARE | Age: 59
End: 2019-12-23
Payer: MEDICARE

## 2019-12-23 VITALS
RESPIRATION RATE: 20 BRPM | SYSTOLIC BLOOD PRESSURE: 145 MMHG | HEART RATE: 69 BPM | OXYGEN SATURATION: 98 % | DIASTOLIC BLOOD PRESSURE: 74 MMHG

## 2019-12-23 VITALS
RESPIRATION RATE: 18 BRPM | DIASTOLIC BLOOD PRESSURE: 64 MMHG | TEMPERATURE: 98.1 F | HEART RATE: 64 BPM | OXYGEN SATURATION: 97 % | SYSTOLIC BLOOD PRESSURE: 147 MMHG

## 2019-12-23 DIAGNOSIS — G89.29 OTHER CHRONIC PAIN: ICD-10-CM

## 2019-12-23 DIAGNOSIS — S42.411G CLOSED SUPRACONDYLAR FRACTURE OF RIGHT HUMERUS WITH DELAYED HEALING, SUBSEQUENT ENCOUNTER: ICD-10-CM

## 2019-12-23 DIAGNOSIS — R06.02 SHORTNESS OF BREATH: Primary | ICD-10-CM

## 2019-12-23 DIAGNOSIS — N18.4 CKD (CHRONIC KIDNEY DISEASE) STAGE 4, GFR 15-29 ML/MIN (HCC): ICD-10-CM

## 2019-12-23 DIAGNOSIS — Z51.5 PALLIATIVE CARE PATIENT: ICD-10-CM

## 2019-12-23 DIAGNOSIS — R60.9 EDEMA, UNSPECIFIED TYPE: ICD-10-CM

## 2019-12-23 DIAGNOSIS — I50.30 DIASTOLIC CONGESTIVE HEART FAILURE, UNSPECIFIED HF CHRONICITY (HCC): ICD-10-CM

## 2019-12-23 DIAGNOSIS — Z86.19 HISTORY OF TYPE C VIRAL HEPATITIS: ICD-10-CM

## 2019-12-23 PROBLEM — S42.411A CLOSED SUPRACONDYLAR FRACTURE OF RIGHT HUMERUS: Status: ACTIVE | Noted: 2019-12-23

## 2019-12-23 PROCEDURE — G8482 FLU IMMUNIZE ORDER/ADMIN: HCPCS | Performed by: FAMILY MEDICINE

## 2019-12-23 PROCEDURE — 99309 SBSQ NF CARE MODERATE MDM 30: CPT | Performed by: FAMILY MEDICINE

## 2019-12-23 ASSESSMENT — ENCOUNTER SYMPTOMS
DIARRHEA: 0
BACK PAIN: 0
VOICE CHANGE: 0
COLOR CHANGE: 0
GASTROINTESTINAL NEGATIVE: 1
COUGH: 0
WHEEZING: 0
SORE THROAT: 0
VOMITING: 0
SINUS PAIN: 0
EYES NEGATIVE: 1
CHEST TIGHTNESS: 0
ABDOMINAL PAIN: 0
TROUBLE SWALLOWING: 0
SHORTNESS OF BREATH: 0
ALLERGIC/IMMUNOLOGIC NEGATIVE: 1
NAUSEA: 0
CONSTIPATION: 0

## 2019-12-26 LAB
BASOPHILS ABSOLUTE: ABNORMAL
BASOPHILS RELATIVE PERCENT: ABNORMAL
BUN BLDV-MCNC: 42 MG/DL
CALCIUM SERPL-MCNC: 9.7 MG/DL
CHLORIDE BLD-SCNC: 107 MMOL/L
CO2: 23 MMOL/L
CREAT SERPL-MCNC: 2.7 MG/DL
EOSINOPHILS ABSOLUTE: ABNORMAL
EOSINOPHILS RELATIVE PERCENT: ABNORMAL
GFR CALCULATED: 18
GLUCOSE BLD-MCNC: 161 MG/DL
HCT VFR BLD CALC: 28.8 % (ref 36–46)
HEMOGLOBIN: 9.7 G/DL (ref 12–16)
LYMPHOCYTES ABSOLUTE: ABNORMAL
LYMPHOCYTES RELATIVE PERCENT: ABNORMAL
MCH RBC QN AUTO: 28.8 PG
MCHC RBC AUTO-ENTMCNC: 33.8 G/DL
MCV RBC AUTO: 85.1 FL
MONOCYTES ABSOLUTE: ABNORMAL
MONOCYTES RELATIVE PERCENT: ABNORMAL
NEUTROPHILS ABSOLUTE: ABNORMAL
NEUTROPHILS RELATIVE PERCENT: ABNORMAL
PLATELET # BLD: 174 K/ΜL
PMV BLD AUTO: 10 FL
POTASSIUM SERPL-SCNC: 4.9 MMOL/L
RBC # BLD: 3.38 10^6/ΜL
SODIUM BLD-SCNC: 137 MMOL/L
WBC # BLD: 7.9 10^3/ML

## 2019-12-31 VITALS
HEART RATE: 70 BPM | BODY MASS INDEX: 37.75 KG/M2 | OXYGEN SATURATION: 100 % | DIASTOLIC BLOOD PRESSURE: 80 MMHG | TEMPERATURE: 98.1 F | WEIGHT: 241 LBS | SYSTOLIC BLOOD PRESSURE: 142 MMHG | RESPIRATION RATE: 20 BRPM

## 2019-12-31 VITALS
DIASTOLIC BLOOD PRESSURE: 80 MMHG | RESPIRATION RATE: 18 BRPM | HEART RATE: 72 BPM | WEIGHT: 232 LBS | BODY MASS INDEX: 36.34 KG/M2 | SYSTOLIC BLOOD PRESSURE: 142 MMHG | TEMPERATURE: 98.1 F | OXYGEN SATURATION: 98 %

## 2019-12-31 NOTE — PROGRESS NOTES
([facility] [place])    History of seizures     History of type C viral hepatitis     HTN (hypertension)     Hyperlipidemia     Impaired mobility and activities of daily living     Mediastinal lymphadenopathy 2013    [facility]    Metabolic syndrome     Neurogenic urinary incontinence 2013    [facility] in [facility]     Obesity (BMI 30-39. 9)     Recurrent UTI     S/P colonoscopy 2014    [facility], focal active colitis    Schizophrenia, paranoid, chronic ([facility]     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 Sitting up in the chair in the room, in no acute distress. Her feet are elevated to help reduce swelling. She is awake, alert, pleasant. Mucosa is moist and pink. Peripheral pulses are palpable. Lower extremities have 1+ edema in pedal ankle area. Heart is regular. Lungs are clear. No cyanosis. Abdomen is obese. Positive bowel sounds. ASSESSMENT AND PLAN:   1. Right humerus fracture, nonweightbearing. Therapy on hold. 2.  DM 2, hyperglycemia. Mozcorinne Skeeters is up to 30 units and we will increase her Humalog to 12 units t.i.d. POC, medications, laboratories reviewed. We will continue to follow.           Electronically Signed By: Alain Kussmaul, DNP-C, GNP-BC on 12/31/2019 09:21:58  ______________________________  Alain Kussmaul, DNP-C, GNP-BC  /[id number]  D: 12/30/2019 11:45:38  T: 12/30/2019 22:57:05    cc: [address] [place]

## 2020-01-01 RX ORDER — PREGABALIN 25 MG/1
75 CAPSULE ORAL ONCE
Qty: 3 CAPSULE | Refills: 0 | Status: SHIPPED | OUTPATIENT
Start: 2020-01-01 | End: 2020-01-31 | Stop reason: SDUPTHER

## 2020-01-02 LAB
BASOPHILS ABSOLUTE: ABNORMAL
BASOPHILS RELATIVE PERCENT: ABNORMAL
BUN BLDV-MCNC: 39 MG/DL
CALCIUM SERPL-MCNC: 9.9 MG/DL
CHLORIDE BLD-SCNC: 107 MMOL/L
CO2: 22 MMOL/L
CREAT SERPL-MCNC: 2.5 MG/DL
EOSINOPHILS ABSOLUTE: ABNORMAL
EOSINOPHILS RELATIVE PERCENT: ABNORMAL
GFR CALCULATED: NORMAL
GLUCOSE BLD-MCNC: 150 MG/DL
HCT VFR BLD CALC: 27.7 % (ref 36–46)
HEMOGLOBIN: 9.7 G/DL (ref 12–16)
LYMPHOCYTES ABSOLUTE: ABNORMAL
LYMPHOCYTES RELATIVE PERCENT: ABNORMAL
MCH RBC QN AUTO: 29.4 PG
MCHC RBC AUTO-ENTMCNC: 35 G/DL
MCV RBC AUTO: 83.9 FL
MONOCYTES ABSOLUTE: ABNORMAL
MONOCYTES RELATIVE PERCENT: ABNORMAL
NEUTROPHILS ABSOLUTE: ABNORMAL
NEUTROPHILS RELATIVE PERCENT: ABNORMAL
PLATELET # BLD: 200 K/ΜL
PMV BLD AUTO: 9.8 FL
POTASSIUM SERPL-SCNC: 4.9 MMOL/L
RBC # BLD: 3.3 10^6/ΜL
SODIUM BLD-SCNC: 138 MMOL/L
WBC # BLD: 8.1 10^3/ML

## 2020-01-03 VITALS
BODY MASS INDEX: 36.02 KG/M2 | TEMPERATURE: 98.4 F | DIASTOLIC BLOOD PRESSURE: 62 MMHG | RESPIRATION RATE: 18 BRPM | HEART RATE: 80 BPM | WEIGHT: 230 LBS | SYSTOLIC BLOOD PRESSURE: 130 MMHG

## 2020-01-03 NOTE — PROGRESS NOTES
PAST MEDICAL HISTORY:  CAD status post percutaneous coronary angioplasty, CHF, type 2 diabetes mellitus, GERD, hypertension, hyperlipidemia, obesity, history of type C viral hepatitis, history of seizures, chronic paranoid schizophrenia, vitamin D deficiency, chronic knee pain status post total knee arthroplasty bilateral.    ALLERGIES:  Codeine. FAMILY HISTORY:  Hypertension in her father, diabetes and mental illness in a sister and cancer (?) in her mother. FAMILY HISTORY:  The patient is a  mother of 2 children who is no longer working as she is considered as \"disabled\". No history of tobacco, alcohol or illicit drug use. MEDICATIONS:  Insulin aspart 18 units subcu before breakfast and dinner and eight units before lunch, Imdur 60 mg extended release tablet once daily, magnesium 400 mg capsule once daily, melatonin 3 mg tablets once q.h.s., Macrodantin 50 mg capsule once daily, Nitrostat 0.4 mg sublingual tablets 1 tab SL p.r.n. for chest pain q.5 minutes x 3 doses before calling 911, nystatin 100,000 unit powder applied to abdominal fold q. twice daily, Prilosec 20 mg capsule once daily as needed for heartburn, Abilify 5 mg tablet once daily, aspirin 81 mg enteric coated once daily, atorvastatin 40 mg q.h.s., basaglar insulin 30 units q.h.s. to keep fasting glucose between 150 and 180, Coreg 6.25 mg tablet twice daily, Plavix 75 mg tablet daily, vitamin D3 1000 unit capsule once daily, Lyrica 75 mg capsule twice daily, Zoloft 50 mg tablet once daily, tradjenta 5 mg tablet once daily, trazodone 50 mg tablet q.h.s. take 1 to 2 tablets by mouth q.h.s. for insomnia and vitamin B12 100 mcg tablet once daily. REVIEW OF SYSTEMS:  As in HPI above. PHYSICAL EXAMINATION:  Her vital signs include blood pressure 130/62 mmHg, heart rate is 80 per minute, respiratory rate is 18 per minute. Temperature is 98.4 and she is saturating at 98% on room air.   Hospital records shows that she is 5 feet 7 inches tall and weighed 230 pounds as of 10/20/2019. The patient is a warm and pleasant, but chronically ill appearing, middle-aged  female, who is alert, but not pale or febrile to the touch. She is morbidly obese. Head is normocephalic and atraumatic with pupils equal, round, and reactive to light and accommodation. She has moist oral mucosa. Neck is supple without JVD or palpable thyromegaly. Lungs are clear to auscultations bilaterally and she has audible heart tones, S1 and S2 with regular rate and rhythm. Abdomen is obese, but nontender. No joint swelling or pedal edema. Skin is warm, dry, and intact. She is alert and well-oriented. Muscle strength is about 4/five globally. She has appropriate affect. ASSESSMENT AND PLAN:  1. Generalized muscle weakness:  PT, OT, ST to evaluate and treat accordingly. 2.   Hypertensive heart disease with chronic diastolic heart failure and CKD stage IV. Control BP and avoid use of nephrotoxic agents. 3.   Type 2 diabetes mellitus, uncontrolled with complications and long-term use of insulin. We need to obtain serum A1c, but we will continue to monitor her blood sugar a.c. and h.s. and give insulin supplement as directed. 4.   Abnormality of gait and mobility. The patient was encouraged to continue using her assistive device. 5.   History of recurrent falls: Fall prevention counseled. 6.   Weight loss also counseled.          Electronically Signed By: Ivelisse Brennan MD on 12/31/2019 17:23:04  ______________________________  Ivelisse Brennan MD  /[id number]  D: 10/21/2019 23:24:57  T: 10/22/2019 19:33:46    cc: [facility]

## 2020-01-09 ENCOUNTER — OFFICE VISIT (OUTPATIENT)
Dept: GERIATRIC MEDICINE | Age: 60
End: 2020-01-09
Payer: MEDICARE

## 2020-01-09 LAB
BASOPHILS ABSOLUTE: ABNORMAL
BASOPHILS RELATIVE PERCENT: 0.3 %
BUN BLDV-MCNC: 46 MG/DL
CALCIUM SERPL-MCNC: 10.2 MG/DL
CHLORIDE BLD-SCNC: 108 MMOL/L
CO2: 23 MMOL/L
CREAT SERPL-MCNC: 2.9 MG/DL
EOSINOPHILS ABSOLUTE: 0.3 /ΜL
EOSINOPHILS RELATIVE PERCENT: 3.7 %
GFR CALCULATED: NORMAL
GLUCOSE BLD-MCNC: 136 MG/DL
HCT VFR BLD CALC: 27.1 % (ref 36–46)
HEMOGLOBIN: 9.4 G/DL (ref 12–16)
LYMPHOCYTES ABSOLUTE: 2.1 /ΜL
LYMPHOCYTES RELATIVE PERCENT: 28.1 %
MCH RBC QN AUTO: 28.8 PG
MCHC RBC AUTO-ENTMCNC: 34.7 G/DL
MCV RBC AUTO: 82.9 FL
MONOCYTES ABSOLUTE: 0.5 /ΜL
MONOCYTES RELATIVE PERCENT: 6.1 %
NEUTROPHILS ABSOLUTE: 4.5 /ΜL
NEUTROPHILS RELATIVE PERCENT: 61.8 %
PLATELET # BLD: 192 K/ΜL
PMV BLD AUTO: 9.6 FL
POTASSIUM SERPL-SCNC: 5.2 MMOL/L
RBC # BLD: 3.27 10^6/ΜL
SODIUM BLD-SCNC: 138 MMOL/L
WBC # BLD: 7.3 10^3/ML

## 2020-01-09 PROCEDURE — 3046F HEMOGLOBIN A1C LEVEL >9.0%: CPT | Performed by: NURSE PRACTITIONER

## 2020-01-09 PROCEDURE — 99308 SBSQ NF CARE LOW MDM 20: CPT | Performed by: NURSE PRACTITIONER

## 2020-01-09 PROCEDURE — G8482 FLU IMMUNIZE ORDER/ADMIN: HCPCS | Performed by: NURSE PRACTITIONER

## 2020-01-16 LAB
BASOPHILS ABSOLUTE: ABNORMAL
BASOPHILS ABSOLUTE: NORMAL
BASOPHILS RELATIVE PERCENT: ABNORMAL
BASOPHILS RELATIVE PERCENT: NORMAL
BUN BLDV-MCNC: 46 MG/DL
CALCIUM SERPL-MCNC: 10.1 MG/DL
CHLORIDE BLD-SCNC: 107 MMOL/L
CO2: 22 MMOL/L
CREAT SERPL-MCNC: 2.8 MG/DL
EOSINOPHILS ABSOLUTE: ABNORMAL
EOSINOPHILS ABSOLUTE: NORMAL
EOSINOPHILS RELATIVE PERCENT: ABNORMAL
EOSINOPHILS RELATIVE PERCENT: NORMAL
GFR CALCULATED: NORMAL
GLUCOSE BLD-MCNC: 206 MG/DL
HCT VFR BLD CALC: 27.9 % (ref 36–46)
HCT VFR BLD CALC: NORMAL %
HEMOGLOBIN: 9.6 G/DL (ref 12–16)
HEMOGLOBIN: NORMAL
LYMPHOCYTES ABSOLUTE: ABNORMAL
LYMPHOCYTES ABSOLUTE: NORMAL
LYMPHOCYTES RELATIVE PERCENT: ABNORMAL
LYMPHOCYTES RELATIVE PERCENT: NORMAL
MCH RBC QN AUTO: 28.5 PG
MCH RBC QN AUTO: NORMAL PG
MCHC RBC AUTO-ENTMCNC: 34.3 G/DL
MCHC RBC AUTO-ENTMCNC: NORMAL G/DL
MCV RBC AUTO: 83.1 FL
MCV RBC AUTO: NORMAL FL
MONOCYTES ABSOLUTE: ABNORMAL
MONOCYTES ABSOLUTE: NORMAL
MONOCYTES RELATIVE PERCENT: ABNORMAL
MONOCYTES RELATIVE PERCENT: NORMAL
NEUTROPHILS ABSOLUTE: ABNORMAL
NEUTROPHILS ABSOLUTE: NORMAL
NEUTROPHILS RELATIVE PERCENT: ABNORMAL
NEUTROPHILS RELATIVE PERCENT: NORMAL
PLATELET # BLD: 184 K/ΜL
PLATELET # BLD: NORMAL 10*3/UL
PMV BLD AUTO: 9.9 FL
PMV BLD AUTO: NORMAL FL
POTASSIUM SERPL-SCNC: 5.5 MMOL/L
RBC # BLD: 3.6 10^6/ΜL
RBC # BLD: NORMAL 10*6/UL
SODIUM BLD-SCNC: 137 MMOL/L
WBC # BLD: 7.7 10^3/ML
WBC # BLD: NORMAL 10*3/UL

## 2020-01-19 VITALS — DIASTOLIC BLOOD PRESSURE: 80 MMHG | HEART RATE: 78 BPM | SYSTOLIC BLOOD PRESSURE: 122 MMHG | RESPIRATION RATE: 18 BRPM

## 2020-01-19 NOTE — PROGRESS NOTES
[facility]  [address], [address]    1/9/2020    Juliana Clark  is a 62 y.o. in the  being seen for a f/u of elevated creatinine. She has a nephrologist he told her (per her report to us) that she may eventually need dialysis, her kidneys will continue to worsen. So now she is hyper fixated on her bun and sCr. Chief Complaint   Patient presents with    Chronic Kidney Disease     worried about creatinine        HPI lives in  [place] until she is weight bearing on her arm then she will begin rehab AGAIN. Worried her BG are too high. Printed levels and given to pt to reassure they are in good control. Past Medical History:   Diagnosis Date    Anxiety     CAD S/P percutaneous coronary angioplasty 2015, 2018    stent per dr Ines Diehl CHF (congestive heart failure) ([address]     Diabetic nephropathy with proteinuria ([address] 2014    DJD (degenerative joint disease) of knee     Dr Odalis Victor GERD (gastroesophageal reflux disease)     Hemiparesis, left ([address] 2013    entered Assisted Living ([facility])    History of seizures     History of type C viral hepatitis     HTN (hypertension)     Hyperlipidemia     Impaired mobility and activities of daily living     Mediastinal lymphadenopathy 2013    Dr Lei Chandler, Trice Washburn    Metabolic syndrome     Neurogenic urinary incontinence 2013    Neuropathy in diabetes ([address]     Obesity (BMI 30-39. 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 Jennifer Doran      x1 Dr. Angie Holguin, Dr Osiris Mobley CATH LAB PROCEDURE  10/02/2019    HYSTERECTOMY, TOTAL ABDOMINAL      one ovary intact, Dr Margarita Rouse, menorrhagia    VA TOTAL KNEE ARTHROPLASTY Left 2018    LEFT KNEE TOTAL KNEE ARTHROPLASTY, SHAYNA, NERVE BLOCK performed by Anand Ghosh MD at [address] [place] [address] Right     TOTAL KNEE ARTHROPLASTY  16    Dr Mima Hutson     Family History   Problem Relation Age of Onset    Cancer Mother 76        survived   Otelia Alok Hypertension Father     Diabetes Sister     Mental Illness Sister      Social History     Socioeconomic History    Marital status:      Spouse name: Not on file    Number of children: 2    Years of education: Not on file    Highest education level: Not on file   Occupational History    Occupation: disabled   Social Needs    Financial resource strain: Not on file    Food insecurity:     Worry: Not on file     Inability: Not on file   [employer] needs:     Medical: Not on file     Non-medical: Not on file   Tobacco Use    Smoking status: Passive Smoke Exposure - Never Smoker    Smokeless tobacco: Never Used   Substance and Sexual Activity    Alcohol use: No     Alcohol/week: 0.0 standard drinks    Drug use: No    Sexual activity: Not Currently   Lifestyle    Physical activity:     Days per week: Not on file     Minutes per session: Not on file    Stress: Not on file   Relationships    Social connections:     Talks on phone: Not on file     Gets together: Not on file     Attends Congregational service: Not on file     Active member of club or organization: Not on file     Attends meetings of clubs or organizations: Not on file     Relationship status: Not on file    Intimate partner violence:     Fear of current or ex partner: Not on file     Emotionally abused: Not on file     Physically abused: Not on file     Forced sexual activity: Not on file   Other Topics Concern    Not on file   Social History Narrative    Born in [place], one of 5    Twin sister Reyes, very ill in 2018, [place] [address]    Moved to [facility], , 2 children, one son and one daughter    Worked at [employer], as a nurse's aide    Disabled due to mental illness    Lived at [employer], was discharged, returned to independent living in 2017 in the daughter's house and has adjusted well    One son and one daughter, live in the same house with patient, Slava Manjarrez pays the rent    [employer] reading (misteries)       Allergies: Codeine  NF MEDICATIONS REVIEWED    ROS:  See HPI  Constitutional: There are no reports of behavioral issues, change in appetite, fever, or weakness. No gross bleeding issues. Respiratory: denies SOB, dyspnea, dyspnea on exertion, change in exercise capacity  Cardiovascular: denies CP, lightheadedness, palpitations, PND, orthopnea, or claudication. GI: No N/V/D. : no reports of dysuria  Extremities: No reports of untreated pain issues, +edema    Physical exam:   /80   Pulse 78   Resp 18   LMP  (LMP Unknown)   Constitutional: Awake and alert sitting up in lounge chair   Cardiovascular: Regular rate  -c/r  Respiratory: LCTA  GI: abdomen +BS NT ND  : no suprapubic tenderness  Extremities: tr pedal edema, PPP    ASSESSMENT:     Diagnosis Orders   1. CKD (chronic kidney disease) stage 3, GFR 30-59 ml/min ([facility])     2. Uncontrolled type 2 diabetes mellitus with hyperglycemia (HCC)         PLAN:   1. sCr 2.9 is going up, nephrology aware. adhere to the JNC VIII guidelines for HTN management   2. Her BG levels are very good. On Lantus 30 units will hold if BG <100 and gets humalog 12u tid at meals we will hold if not eating or BG < 100    Return in about 1 month (around 2/9/2020).     Pertinent POC, labs, have been reviewed, continue same. Encourage fluids and good nutrition. Stress fall prevention strategies.     Alexandra Mullins, APRN-CNP      Alexandra Mullisn DNP, MSN, RN, GNP-BC, NP-C  Adult/[facility]oni Nurse Practitioner  [address], [place]  Department of Geriatrics  8:30am-4:30pm   [phone]  After hours Answering service [phone]

## 2020-01-20 ENCOUNTER — OFFICE VISIT (OUTPATIENT)
Dept: PALLATIVE CARE | Age: 60
End: 2020-01-20
Payer: MEDICARE

## 2020-01-20 VITALS
DIASTOLIC BLOOD PRESSURE: 72 MMHG | OXYGEN SATURATION: 98 % | SYSTOLIC BLOOD PRESSURE: 156 MMHG | RESPIRATION RATE: 20 BRPM | HEART RATE: 82 BPM

## 2020-01-20 PROCEDURE — 99309 SBSQ NF CARE MODERATE MDM 30: CPT | Performed by: FAMILY MEDICINE

## 2020-01-20 PROCEDURE — G8482 FLU IMMUNIZE ORDER/ADMIN: HCPCS | Performed by: FAMILY MEDICINE

## 2020-01-20 ASSESSMENT — ENCOUNTER SYMPTOMS
BACK PAIN: 0
ABDOMINAL PAIN: 0
SINUS PAIN: 0
DIARRHEA: 0
VOICE CHANGE: 0
EYES NEGATIVE: 1
NAUSEA: 0
ALLERGIC/IMMUNOLOGIC NEGATIVE: 1
SHORTNESS OF BREATH: 1
COUGH: 0
CONSTIPATION: 0
WHEEZING: 0
CHEST TIGHTNESS: 0
GASTROINTESTINAL NEGATIVE: 1
TROUBLE SWALLOWING: 0
SORE THROAT: 0
VOMITING: 0
COLOR CHANGE: 0

## 2020-01-20 NOTE — PROGRESS NOTES
Drug use: No    Sexual activity: Not Currently   Lifestyle    Physical activity:     Days per week: Not on file     Minutes per session: Not on file    Stress: Not on file   Relationships    Social connections:     Talks on phone: Not on file     Gets together: Not on file     Attends Judaism service: Not on file     Active member of club or organization: Not on file     Attends meetings of clubs or organizations: Not on file     Relationship status: Not on file    Intimate partner violence:     Fear of current or ex partner: Not on file     Emotionally abused: Not on file     Physically abused: Not on file     Forced sexual activity: Not on file   Other Topics Concern    Not on file   Social History Narrative    Born in [place], one of 5    Twin sister Reyes, very ill in 2018, [place] [id number]    Moved to [facility], , 2 children, one son and one daughter    Worked at [employer], as a nurse's aide    Disabled due to mental illness    Lived at [employer], was discharged, returned to independent living in 2017 in the daughter's house and has adjusted well    One son and one daughter, live in the same house with patient, Candise Sever pays the rent    [employer] reading ([employer])     Family History   Problem Relation Age of Onset    Cancer Mother 76        survived   Sharran Gee Hypertension Father     Diabetes Sister     Mental Illness Sister      Allergies   Allergen Reactions    Codeine Hives     hives     Current Outpatient Medications on File Prior to Visit   Medication Sig Dispense Refill    pregabalin (LYRICA) 25 MG capsule Take 3 capsules by mouth once for 1 dose.  3 capsule 0    vitamin B-12 (CYANOCOBALAMIN) 100 MCG tablet TAKE 1 TABLET BY MOUTH DAILY 30 tablet 11    D3-1000 1000 units CAPS TAKE 1 CAPSULE BY MOUTH ONCE DAILY 90 capsule 2    pregabalin (LYRICA) 75 MG capsule Give one capsule po bid 60 capsule 1    traZODone (DESYREL) 50 MG tablet TAKE 1 TO 2 TABLETS BY MOUTH EVERY NIGHT AT HENT:      Head: Normocephalic and atraumatic. Eyes:      Pupils: Pupils are equal, round, and reactive to light. Neck:      Musculoskeletal: Normal range of motion and neck supple. Cardiovascular:      Rate and Rhythm: Normal rate and regular rhythm. Heart sounds: No murmur. No friction rub. No gallop. Pulmonary:      Effort: Pulmonary effort is normal.      Breath sounds: Normal breath sounds. No wheezing or rales. Chest:      Chest wall: No tenderness. Abdominal:      General: Bowel sounds are normal. There is no distension. Palpations: Abdomen is soft. Tenderness: There is no tenderness. There is no guarding. Musculoskeletal: Normal range of motion. General: No tenderness or deformity. Skin:     General: Skin is warm and dry. Findings: No erythema or rash. Neurological:      Mental Status: She is alert and oriented to person, place, and time. Motor: Weakness present. Coordination: Coordination abnormal.      Gait: Gait abnormal.         Assessment and Plan:      1. Shortness of breath  2. Edema, unspecified type  3. Diastolic congestive heart failure, unspecified HF chronicity (HCC)  - Stable    -Continue diuretic as prescribed  -Daily weights, call if you gain 3 lbs in one day or 5 lbs. in one week, or for increased edema, sob, cough, orthopnea, or chest pain  -Elevate BLE while in dependent position  -Avoid added salt; reviewed basic skin care.  -Compression socks on during day, off at night  -Maintain follow up with cardiology-    4. CKD (chronic kidney disease) stage 4, GFR 15-29 ml/min ([facility])  - stable disease process  - Had conversation about possiable future need for dialysis if GFR remains trending downward and how proper HTN and diabetic control can aid in maintaining kidney fiunction    5. Closed supracondylar fracture of right humerus with delayed healing, subsequent encounter  6.  Other chronic pain  Controlled on current regime  - F/U with

## 2020-01-23 LAB
BUN BLDV-MCNC: 38 MG/DL
CALCIUM SERPL-MCNC: 10.4 MG/DL
CHLORIDE BLD-SCNC: 107 MMOL/L
CO2: 21 MMOL/L
CREAT SERPL-MCNC: 2.9 MG/DL
GFR CALCULATED: NORMAL
GLUCOSE BLD-MCNC: 193 MG/DL
POTASSIUM SERPL-SCNC: 4.9 MMOL/L
SODIUM BLD-SCNC: 138 MMOL/L

## 2020-01-24 LAB
BASOPHILS ABSOLUTE: ABNORMAL
BASOPHILS RELATIVE PERCENT: ABNORMAL
EOSINOPHILS ABSOLUTE: ABNORMAL
EOSINOPHILS RELATIVE PERCENT: ABNORMAL
HCT VFR BLD CALC: 26.6 % (ref 36–46)
HEMOGLOBIN: 9.2 G/DL (ref 12–16)
LYMPHOCYTES ABSOLUTE: ABNORMAL
LYMPHOCYTES RELATIVE PERCENT: ABNORMAL
MCH RBC QN AUTO: 29.2 PG
MCHC RBC AUTO-ENTMCNC: 34.7 G/DL
MCV RBC AUTO: 84.2 FL
MONOCYTES ABSOLUTE: ABNORMAL
MONOCYTES RELATIVE PERCENT: ABNORMAL
NEUTROPHILS ABSOLUTE: ABNORMAL
NEUTROPHILS RELATIVE PERCENT: ABNORMAL
PLATELET # BLD: 168 K/ΜL
PMV BLD AUTO: 10.2 FL
RBC # BLD: 3.16 10^6/ΜL
WBC # BLD: 7.1 10^3/ML

## 2020-01-28 ENCOUNTER — OFFICE VISIT (OUTPATIENT)
Dept: GERIATRIC MEDICINE | Age: 60
End: 2020-01-28
Payer: MEDICARE

## 2020-01-28 PROCEDURE — 99309 SBSQ NF CARE MODERATE MDM 30: CPT | Performed by: NURSE PRACTITIONER

## 2020-01-28 PROCEDURE — 3046F HEMOGLOBIN A1C LEVEL >9.0%: CPT | Performed by: NURSE PRACTITIONER

## 2020-01-28 PROCEDURE — G8482 FLU IMMUNIZE ORDER/ADMIN: HCPCS | Performed by: NURSE PRACTITIONER

## 2020-01-29 ENCOUNTER — OFFICE VISIT (OUTPATIENT)
Dept: CARDIOLOGY CLINIC | Age: 60
End: 2020-01-29
Payer: MEDICARE

## 2020-01-29 VITALS
SYSTOLIC BLOOD PRESSURE: 126 MMHG | HEART RATE: 57 BPM | RESPIRATION RATE: 16 BRPM | DIASTOLIC BLOOD PRESSURE: 70 MMHG | OXYGEN SATURATION: 97 %

## 2020-01-29 PROCEDURE — G8427 DOCREV CUR MEDS BY ELIG CLIN: HCPCS | Performed by: INTERNAL MEDICINE

## 2020-01-29 PROCEDURE — G8482 FLU IMMUNIZE ORDER/ADMIN: HCPCS | Performed by: INTERNAL MEDICINE

## 2020-01-29 PROCEDURE — 1036F TOBACCO NON-USER: CPT | Performed by: INTERNAL MEDICINE

## 2020-01-29 PROCEDURE — G8417 CALC BMI ABV UP PARAM F/U: HCPCS | Performed by: INTERNAL MEDICINE

## 2020-01-29 PROCEDURE — 3017F COLORECTAL CA SCREEN DOC REV: CPT | Performed by: INTERNAL MEDICINE

## 2020-01-29 PROCEDURE — 99213 OFFICE O/P EST LOW 20 MIN: CPT | Performed by: INTERNAL MEDICINE

## 2020-01-29 RX ORDER — MAGNESIUM OXIDE 400 MG/1
400 TABLET ORAL DAILY
COMMUNITY
End: 2021-04-27 | Stop reason: SDUPTHER

## 2020-01-29 RX ORDER — ISOSORBIDE MONONITRATE 60 MG/1
120 TABLET, EXTENDED RELEASE ORAL DAILY
Status: ON HOLD | COMMUNITY
End: 2020-05-30 | Stop reason: HOSPADM

## 2020-01-29 RX ORDER — CARVEDILOL 6.25 MG/1
6.25 TABLET ORAL 2 TIMES DAILY WITH MEALS
Status: ON HOLD | COMMUNITY
End: 2020-05-30 | Stop reason: HOSPADM

## 2020-01-29 RX ORDER — PANTOPRAZOLE SODIUM 20 MG/1
20 TABLET, DELAYED RELEASE ORAL DAILY
Status: ON HOLD | COMMUNITY
End: 2020-05-24

## 2020-01-29 RX ORDER — CLOPIDOGREL BISULFATE 75 MG/1
75 TABLET ORAL DAILY
Status: ON HOLD | COMMUNITY
End: 2020-06-12 | Stop reason: HOSPADM

## 2020-01-29 RX ORDER — ARIPIPRAZOLE 5 MG/1
5 TABLET ORAL DAILY
COMMUNITY
End: 2020-05-01

## 2020-01-29 RX ORDER — LANOLIN ALCOHOL/MO/W.PET/CERES
3 CREAM (GRAM) TOPICAL NIGHTLY PRN
COMMUNITY
End: 2020-03-10

## 2020-01-29 RX ORDER — AMLODIPINE BESYLATE 2.5 MG/1
2.5 TABLET ORAL DAILY
Status: ON HOLD | COMMUNITY
End: 2020-05-24

## 2020-01-29 RX ORDER — ATORVASTATIN CALCIUM 40 MG/1
40 TABLET, FILM COATED ORAL DAILY
COMMUNITY
End: 2020-05-04 | Stop reason: SDUPTHER

## 2020-01-29 NOTE — PROGRESS NOTES
[facility] CARDIOLOGY OFFICE FOLLOW-UP      Patient: Chitra Parmar  YOB: 1960  MRN: [id number]    Chief Complaint:  Chief Complaint   Patient presents with    3 Month Follow-Up    Coronary Artery Disease    Congestive Heart Failure    Hypertension    Chest Pain     intermittent pressure feeling relieved by lying down. no nitro needed/used       Subjective/HPI    The patient is followed in the cardiology office chronically for the following problems: CAD, prior PCI of mid LAD, small vessel, recurrent ISR, dyspnea, chronic diastolic CHF, chronic kidney disease    The last encounter focused on the following: hospital for renal failure, CHF    Testing/hospitalizations/procedures performed since last encounter: PCI mid LAD balloon only    Since the last encounter, the patient has been short of breath but not as bad as before PCI. No chest pain. Past Medical History:   Diagnosis Date    Anxiety     CAD S/P percutaneous coronary angioplasty 2015, 2018    stent per dr Ramiro Dale [facility] ([facility]est[facility]) [facility]     Diabetic nephropathy with proteinuria ([facility] 2014    DJD (degenerative joint disease) of knee     Dr Nelida Mireles GERD (gastroesophageal reflux disease)     Hemiparesis, left ([address] 2013    entered Assisted Living ([facility])    History of seizures     History of type C viral hepatitis     HTN (hypertension)     Hyperlipidemia     Impaired mobility and activities of daily living     Mediastinal lymphadenopathy 2013    Dr Brennen Carreon, Arvil Countess    Metabolic syndrome     Neurogenic urinary incontinence 2013    Neuropathy in diabetes ([facility]     Obesity (BMI 30-39. 9)     Recurrent UTI     S/P colonoscopy 2014    [facility], focal active colitis    Schizophrenia, paranoid, chronic ([address]     [facility]   [employer] vessel disease, cerebrovascular 2013    Status post total knee replacement, right     Status post total left knee replacement 6/21/2018    Traumatic Musculoskeletal: Normal range of motion. No edema. Neurological: The patient is alert and oriented to person, place, and time. Intact cranial nerves. Skin: Skin is warm and dry. No rash noted.        LABS:  CBC:   Lab Results   Component Value Date    WBC 7.1 01/24/2020    RBC 3.16 01/24/2020    HGB 9.2 01/24/2020    HCT 26.6 01/24/2020    MCV 84.2 01/24/2020    MCH 29.2 01/24/2020    MCHC 34.7 01/24/2020    RDW 14.5 11/25/2019     01/24/2020    MPV 10.2 01/24/2020     Lipids:  Lab Results   Component Value Date    CHOL 137 02/26/2019    CHOL 104 06/28/2018    CHOL 118 02/24/2018     Lab Results   Component Value Date    TRIG 116 02/26/2019    TRIG 85 06/28/2018    TRIG 141 02/24/2018     Lab Results   Component Value Date    HDL 55 02/26/2019    HDL 42 06/28/2018    HDL 34 (L) 02/24/2018     Lab Results   Component Value Date    LDLCALC 59 02/26/2019    LDLCALC 45 06/28/2018    LDLCALC 56 02/24/2018     Lab Results   Component Value Date    LABVLDL 59.0 05/04/2013    VLDL 43 01/30/2017     Lab Results   Component Value Date    CHOLHDLRATIO 4.32 01/30/2017     CMP:    Lab Results   Component Value Date     01/23/2020    K 4.9 01/23/2020    K 3.8 10/05/2019     01/23/2020    CO2 21 01/23/2020    BUN 38 01/23/2020    CREATININE 2.9 01/23/2020    GFRAA 19.1 11/25/2019    LABGLOM 18 12/26/2019    LABGLOM 15.8 11/25/2019    GLUCOSE 193 01/23/2020    PROT 6.7 11/22/2019    LABALBU 3.4 11/22/2019    CALCIUM 10.4 01/23/2020    BILITOT 0.3 11/22/2019    ALKPHOS 92 11/22/2019    AST 18 11/22/2019    ALT 13 11/22/2019     BMP:    Lab Results   Component Value Date     01/23/2020    K 4.9 01/23/2020    K 3.8 10/05/2019     01/23/2020    CO2 21 01/23/2020    BUN 38 01/23/2020    LABALBU 3.4 11/22/2019    CREATININE 2.9 01/23/2020    CALCIUM 10.4 01/23/2020    GFRAA 19.1 11/25/2019    LABGLOM 18 12/26/2019    LABGLOM 15.8 11/25/2019    GLUCOSE 193 01/23/2020     Magnesium:    Lab Results Component Value Date    MG 1.9 05/16/2019     TSH:  Lab Results   Component Value Date    TSH 0.574 10/08/2017       Patient Active Problem List   Diagnosis    Coronary artery disease involving native heart with angina pectoris (HCC)    Schizophrenia, paranoid, chronic    Metabolic syndrome    Obesity    Vitamin B 12 deficiency    Cerebral microvascular disease    Mixed hyperlipidemia    Other hammer toe (acquired)    Vitamin D insufficiency    Incontinence of urine    Diabetic [facility]ropathy with proteinuria [facility]    HTN (hypertension)    History of type C viral hepatitis    Urinary incontinence due to cognitive impairment    History of seizures    Stented coronary artery-plan is to stay on Plavix indefinately per Dr Lester Paula    Hemiparesis, left ([address]    Angina, class II ([facility]    CKD (chronic kidney disease) stage 4, GFR 15-29 ml/min ([facility])    Chronic painful diabetic neuropathy ([facility])    Tardive dyskinesia    Shortness of breath    Uncontrolled type 2 diabetes mellitus with hyperglycemia ([facility])    Diastolic CHF ([facility]    Sleep apnea    Pulmonary hypertension ([facility]    Encephalopathy    Obesity (BMI 30-39. 9)    Edema    Closed supracondylar fracture of right humerus    Other chronic pain    Palliative care patient       Medications:  Current Outpatient Medications   Medication Sig Dispense Refill    amLODIPine (NORVASC) 2.5 MG tablet Take 2.5 mg by mouth daily      ARIPiprazole (ABILIFY) 5 MG tablet Take 5 mg by mouth daily      aspirin 81 MG tablet Take 81 mg by mouth daily      atorvastatin (LIPITOR) 40 MG tablet Take 40 mg by mouth daily      carvedilol (COREG) 6.25 MG tablet Take 6.25 mg by mouth 2 times daily (with meals)      clopidogrel (PLAVIX) 75 MG tablet Take 75 mg by mouth daily      isosorbide mononitrate (IMDUR) 60 MG extended release tablet Take 120 mg by mouth daily      magnesium oxide (MAG-OX) 400 MG tablet Take 400 mg by mouth daily      melatonin 3 MG TABS tablet Take 3 mg by mouth nightly as needed      pantoprazole (PROTONIX) 20 MG tablet Take 20 mg by mouth daily      pregabalin (LYRICA) 25 MG capsule Take 3 capsules by mouth once for 1 dose. (Patient taking differently: Take 75 mg by mouth 2 times daily. ) 3 capsule 0    vitamin B-12 (CYANOCOBALAMIN) 100 MCG tablet TAKE 1 TABLET BY MOUTH DAILY 30 tablet 11    D3-1000 1000 units CAPS TAKE 1 CAPSULE BY MOUTH ONCE DAILY 90 capsule 2    pregabalin (LYRICA) 75 MG capsule Give one capsule po bid 60 capsule 1    traZODone (DESYREL) 50 MG tablet TAKE 1 TO 2 TABLETS BY MOUTH EVERY NIGHT AT BEDTIME AS NEEDED FOR INSOMNIA 180 tablet 1    TRADJENTA 5 MG tablet TAKE 1 TABLET BY MOUTH DAILY 90 tablet 1    sertraline (ZOLOFT) 50 MG tablet TAKE (1) TABLET BY MOUTH DAILY 30 tablet 5     No current facility-administered medications for this visit. Assessment/Plan:    1. Coronary artery disease involving native heart with angina pectoris, unspecified vessel or lesion type (HCC)  DAPT, statin, b-blocker and RF modification      2. Mixed hyperlipidemia  statin    3. Essential hypertension  Control bp       Counseling: the patient was counseled regarding diet, exercise, weight control and heart healthy lifestyle. Return in about 3 months (around 4/29/2020) for followup cv disease. Electronically signed by   Roxy Shay.  Chrissie Roberts MD [facility] Director of Cardiology Services and Cardiac Catheterization Laboratory  [facility], [place]    on 1/29/2020 at 10:59 AM

## 2020-01-30 LAB
BASOPHILS ABSOLUTE: ABNORMAL
BASOPHILS RELATIVE PERCENT: ABNORMAL
BUN BLDV-MCNC: 42 MG/DL
CALCIUM SERPL-MCNC: 9.8 MG/DL
CHLORIDE BLD-SCNC: 108 MMOL/L
CO2: 21 MMOL/L
CREAT SERPL-MCNC: 2.7 MG/DL
EOSINOPHILS ABSOLUTE: ABNORMAL
EOSINOPHILS RELATIVE PERCENT: ABNORMAL
GFR CALCULATED: 18
GLUCOSE BLD-MCNC: 146 MG/DL
HCT VFR BLD CALC: 27.1 % (ref 36–46)
HEMOGLOBIN: 9.5 G/DL (ref 12–16)
LYMPHOCYTES ABSOLUTE: ABNORMAL
LYMPHOCYTES RELATIVE PERCENT: ABNORMAL
MCH RBC QN AUTO: 29.5 PG
MCHC RBC AUTO-ENTMCNC: 35.1 G/DL
MCV RBC AUTO: 84.1 FL
MONOCYTES ABSOLUTE: ABNORMAL
MONOCYTES RELATIVE PERCENT: ABNORMAL
NEUTROPHILS ABSOLUTE: ABNORMAL
NEUTROPHILS RELATIVE PERCENT: ABNORMAL
PLATELET # BLD: 187 K/ΜL
PMV BLD AUTO: 9.4 FL
POTASSIUM SERPL-SCNC: 4.8 MMOL/L
RBC # BLD: 3.22 10^6/ΜL
SODIUM BLD-SCNC: 137 MMOL/L
WBC # BLD: 8.9 10^3/ML

## 2020-01-31 RX ORDER — PREGABALIN 75 MG/1
75 CAPSULE ORAL 2 TIMES DAILY
Qty: 60 CAPSULE | Refills: 0 | Status: SHIPPED | OUTPATIENT
Start: 2020-01-31 | End: 2020-03-18

## 2020-01-31 NOTE — TELEPHONE ENCOUNTER
Rx requested:  Requested Prescriptions     Pending Prescriptions Disp Refills    pregabalin (LYRICA) 25 MG capsule 3 capsule 0     Sig: Take 3 capsules by mouth once for 1 dose.        Last Office Visit:   1/20/2020      Last filled:  1/1/2020    Next Visit Date:  Future Appointments   Date Time Provider Aminata Cortes   2/27/2020  9:00 AM Adilene Yoder[address]   3/3/2020  1:00 PM Deyanira Valera MD [facility]

## 2020-02-05 ENCOUNTER — OFFICE VISIT (OUTPATIENT)
Dept: GERIATRIC MEDICINE | Age: 60
End: 2020-02-05
Payer: MEDICARE

## 2020-02-05 PROCEDURE — G8482 FLU IMMUNIZE ORDER/ADMIN: HCPCS | Performed by: NURSE PRACTITIONER

## 2020-02-05 PROCEDURE — 99309 SBSQ NF CARE MODERATE MDM 30: CPT | Performed by: NURSE PRACTITIONER

## 2020-02-13 LAB
BASOPHILS ABSOLUTE: ABNORMAL
BASOPHILS RELATIVE PERCENT: ABNORMAL
BUN BLDV-MCNC: 60 MG/DL
CALCIUM SERPL-MCNC: 9.9 MG/DL
CHLORIDE BLD-SCNC: 108 MMOL/L
CO2: 24 MMOL/L
CREAT SERPL-MCNC: 2.8 MG/DL
EOSINOPHILS ABSOLUTE: ABNORMAL
EOSINOPHILS RELATIVE PERCENT: ABNORMAL
GFR CALCULATED: 17
GLUCOSE BLD-MCNC: 95 MG/DL
HCT VFR BLD CALC: 27.6 % (ref 36–46)
HEMOGLOBIN: 9.5 G/DL (ref 12–16)
LYMPHOCYTES ABSOLUTE: ABNORMAL
LYMPHOCYTES RELATIVE PERCENT: ABNORMAL
MCH RBC QN AUTO: 29.5 PG
MCHC RBC AUTO-ENTMCNC: 34.4 G/DL
MCV RBC AUTO: 85.9 FL
MONOCYTES ABSOLUTE: ABNORMAL
MONOCYTES RELATIVE PERCENT: ABNORMAL
NEUTROPHILS ABSOLUTE: ABNORMAL
NEUTROPHILS RELATIVE PERCENT: ABNORMAL
PLATELET # BLD: 180 K/ΜL
PMV BLD AUTO: 9.7 FL
POTASSIUM SERPL-SCNC: 4.7 MMOL/L
RBC # BLD: 3.21 10^6/ΜL
SODIUM BLD-SCNC: 141 MMOL/L
WBC # BLD: 9.3 10^3/ML

## 2020-02-17 VITALS
OXYGEN SATURATION: 98 % | RESPIRATION RATE: 18 BRPM | HEART RATE: 75 BPM | SYSTOLIC BLOOD PRESSURE: 140 MMHG | DIASTOLIC BLOOD PRESSURE: 72 MMHG | TEMPERATURE: 98.3 F

## 2020-02-17 NOTE — PROGRESS NOTES
Electronically Signed By: ALESHIA Christy, GNP-BC on 02/16/2020 21:16:04  ______________________________  ALESHIA Christy, GNP-BC  /[id number]  D: 02/12/2020 15:27:23  T: 02/13/2020 11:35:34

## 2020-02-20 VITALS
TEMPERATURE: 92 F | DIASTOLIC BLOOD PRESSURE: 74 MMHG | WEIGHT: 240 LBS | SYSTOLIC BLOOD PRESSURE: 150 MMHG | BODY MASS INDEX: 37.59 KG/M2 | HEART RATE: 76 BPM | RESPIRATION RATE: 18 BRPM

## 2020-02-20 LAB
BASOPHILS ABSOLUTE: ABNORMAL
BASOPHILS RELATIVE PERCENT: ABNORMAL
BUN BLDV-MCNC: 42 MG/DL
CALCIUM SERPL-MCNC: 10 MG/DL
CHLORIDE BLD-SCNC: 110 MMOL/L
CO2: 21 MMOL/L
CREAT SERPL-MCNC: 3.1 MG/DL
EOSINOPHILS ABSOLUTE: ABNORMAL
EOSINOPHILS RELATIVE PERCENT: ABNORMAL
GFR CALCULATED: 15
GLUCOSE BLD-MCNC: 149 MG/DL
HCT VFR BLD CALC: 27.6 % (ref 36–46)
HEMOGLOBIN: 9.4 G/DL (ref 12–16)
LYMPHOCYTES ABSOLUTE: ABNORMAL
LYMPHOCYTES RELATIVE PERCENT: ABNORMAL
MCH RBC QN AUTO: 28.5 PG
MCHC RBC AUTO-ENTMCNC: 34.2 G/DL
MCV RBC AUTO: 83.4 FL
MONOCYTES ABSOLUTE: ABNORMAL
MONOCYTES RELATIVE PERCENT: ABNORMAL
NEUTROPHILS ABSOLUTE: ABNORMAL
NEUTROPHILS RELATIVE PERCENT: ABNORMAL
PLATELET # BLD: 167 K/ΜL
PMV BLD AUTO: 10.5 FL
POTASSIUM SERPL-SCNC: 5.3 MMOL/L
RBC # BLD: 3.31 10^6/ΜL
SODIUM BLD-SCNC: 141 MMOL/L
WBC # BLD: 7.5 10^3/ML

## 2020-02-20 NOTE — PROGRESS NOTES
[facility] : 1960 DOS: 2020     [address]    The patient wants to see me again. She often tells the nurses every time I am in the building she wants to see me. The patient is bipolar. She does have some OCD issues. I did indicate to her that she is doing well. I have given her reassurance and I have also told her she does not need to see me every time I am in the building. She can talk to the nurses about her complaints and that she is doing well with therapy. Her diabetes is stable. She complains to me now that she has left foot pain. In describing it, it is worse when she puts her weight on it. It is the left foot bottom plantar aspect. She is not sure if it gets any better when she is walking on it. She is ambulating more. I gave her an exercise prescription that she was supposed to be doing because she is gaining weight. She is eating too many carbs and she is not watching her diet well while she is here. Plus with her recent fracture of her arm she has been very sedentary. Up until recently she was not allowed to weightbear on the arm, so she was not really doing much activity. Now she can do lots of activity and I told her to keep moving. She is not having any shortness of breath. No nausea, vomiting. No diarrhea. No change in vision or hearing. There is no foot injury that she is aware of. She has not had any falls in the last week or so. She said she did not have any accidents. MEDICATIONS AND ALLERGIES: Reviewed in the nursing facility chart.    Past Medical History:   Diagnosis Date    Anxiety     CAD S/P percutaneous coronary angioplasty ,     stent per dr Velia Hermosillo [facility]estive [facility] failure) [facility]     Diabetic nephropathy with proteinuria ([facility] [facility]     DJD (degenerative joint disease) of knee     Dr Kiet Orr GERD (gastroesophageal reflux disease)     Hemiparesis, left ([facility] [address] [facility]     [facility]  History of seizures     History of type C viral hepatitis     HTN (hypertension)     Hyperlipidemia     Impaired mobility and activities of daily living     Mediastinal lymphadenopathy 2013    Jorge Mtz    Metabolic syndrome     Neurogenic urinary incontinence 2013    [facility] in diabetes [facility] [address] [facility]     Obesity (BMI 30-39. 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 She is sitting up in chair. No acute distress. Her blood pressure is up 150/74. Weight is up 240 pounds. She does have pain in palpating the heel. There is no unusual swelling. There is no redness or erythema. There is no foreign bodies noted. There is no lumps, bumps, no open areas. ASSESSMENT AND PLAN: Plantar fasciitis. We will put her on prednisone 10 mg for 2 days. She does not want a cortisone injection, which I suggested she see a podiatrist for an injection and she refused. We will increase her insulin for the next 5 days because she will be on the prednisone. I did tell her that it we will increase her insulin needs. We will continue to follow.           Electronically Signed By: ALESHIA Torres, GNP-BC on 02/20/2020 08:40:43  ______________________________  ALESHIA Torres, LLOYD  /[id number]  D: 02/19/2020 11:31:53  T: 02/19/2020 18:13:32    cc: [facility]

## 2020-02-27 ENCOUNTER — OFFICE VISIT (OUTPATIENT)
Dept: PALLATIVE CARE | Age: 60
End: 2020-02-27
Payer: MEDICARE

## 2020-02-27 VITALS
SYSTOLIC BLOOD PRESSURE: 136 MMHG | HEART RATE: 71 BPM | OXYGEN SATURATION: 98 % | DIASTOLIC BLOOD PRESSURE: 76 MMHG | RESPIRATION RATE: 20 BRPM

## 2020-02-27 LAB
BASOPHILS ABSOLUTE: ABNORMAL
BASOPHILS RELATIVE PERCENT: ABNORMAL
BUN BLDV-MCNC: 46 MG/DL
CALCIUM SERPL-MCNC: 9.5 MG/DL
CHLORIDE BLD-SCNC: 110 MMOL/L
CO2: 22 MMOL/L
CREAT SERPL-MCNC: 2.6 MG/DL
EOSINOPHILS ABSOLUTE: ABNORMAL
EOSINOPHILS RELATIVE PERCENT: ABNORMAL
GFR CALCULATED: NORMAL
GLUCOSE BLD-MCNC: 220 MG/DL
HCT VFR BLD CALC: 27.4 % (ref 36–46)
HEMOGLOBIN: 9.5 G/DL (ref 12–16)
LYMPHOCYTES ABSOLUTE: ABNORMAL
LYMPHOCYTES RELATIVE PERCENT: ABNORMAL
MCH RBC QN AUTO: 29.5 PG
MCHC RBC AUTO-ENTMCNC: 34.7 G/DL
MCV RBC AUTO: 85 FL
MONOCYTES ABSOLUTE: ABNORMAL
MONOCYTES RELATIVE PERCENT: ABNORMAL
NEUTROPHILS ABSOLUTE: ABNORMAL
NEUTROPHILS RELATIVE PERCENT: ABNORMAL
PLATELET # BLD: 147 K/ΜL
PMV BLD AUTO: 10 FL
POTASSIUM SERPL-SCNC: 5.2 MMOL/L
RBC # BLD: 3.23 10^6/ΜL
SODIUM BLD-SCNC: 138 MMOL/L
WBC # BLD: 6.8 10^3/ML

## 2020-02-27 PROCEDURE — 99309 SBSQ NF CARE MODERATE MDM 30: CPT | Performed by: FAMILY MEDICINE

## 2020-02-27 PROCEDURE — G8482 FLU IMMUNIZE ORDER/ADMIN: HCPCS | Performed by: FAMILY MEDICINE

## 2020-02-27 ASSESSMENT — ENCOUNTER SYMPTOMS
GASTROINTESTINAL NEGATIVE: 1
ALLERGIC/IMMUNOLOGIC NEGATIVE: 1
BACK PAIN: 0
COUGH: 0
EYES NEGATIVE: 1
CHEST TIGHTNESS: 0
NAUSEA: 0
TROUBLE SWALLOWING: 0
WHEEZING: 0
DIARRHEA: 0
SHORTNESS OF BREATH: 1
SORE THROAT: 0
ABDOMINAL PAIN: 0
CONSTIPATION: 0
COLOR CHANGE: 0
VOICE CHANGE: 0
SINUS PAIN: 0
VOMITING: 0

## 2020-02-27 NOTE — PROGRESS NOTES
Subjective:      Patient Id: Seen at [facility] for symptom management. She was accompanied to the appointment by: self. Chief Complaint   Patient presents with    Leg Swelling    Pain          Connor Schulz is a 56 y.o. female seen and examined for symptomatic mangement related to Chronic pain and debility post fracture and CHF. Bacilio Hale has complex medical history that includes DM2, seizures, Hep C, HTN, neuropathy, Paranoid schizophrenia, and Right humerus fracture. . Home visit is necessary in lieu of office due to significant frailty and high symptom burden from comorbid illnesses. Pt is calm, cooperative and in NAD. Edema at baseline. Taking diuretics as ordered. Does not use home O2. Denies excessive fatigue, fever, chills, myalgias, increased sputum production or cough, nausea, vomiting, chest pain, or orthopnea. . States pain is well controlled on current regime. Rates pain at a 0/ 10 and states it occurs in her right humerus and BLE with neuropathy. Describes pain as a intermittent ache. Currently taking tylenol PRN with great relief. Denies Sedation, Constipation, or other adverse effects on current regime. Patient decided to go home despite trouble at home meeting home care needs.  Will follow at discharge      Past Medical History:   Diagnosis Date    Anxiety     CAD S/P percutaneous coronary angioplasty 2015, 2018    stent per dr Juan J Howe CHF (congestive heart failure) [facility]     Diabetic nephropathy with proteinuria ([facility] [address] [facility] 2014    DJD (degenerative joint disease) of knee     Dr Kalyn Porter GERD (gastroesophageal reflux disease)     Hemiparesis, left ([address] 2013    entered Assisted Living ([facility])    History of seizures     History of type C viral hepatitis     HTN (hypertension)     Hyperlipidemia     Impaired mobility and activities of daily living     Mediastinal lymphadenopathy 2013    Rodrigo Terry    Metabolic syndrome     Neurogenic urinary incontinence 2013    Neuropathy in diabetes ([facility] [facility]     Obesity (BMI 30-39. 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 Ilia Saeed      x1 Dr. Sarah Kate, Dr Evan Brush CATH LAB PROCEDURE  10/02/2019    HYSTERECTOMY, TOTAL ABDOMINAL      one ovary intact, Dr Janiya Avila, menorrhagia    RI TOTAL KNEE ARTHROPLASTY Left 2018    LEFT KNEE TOTAL KNEE ARTHROPLASTY, SHAYNA, NERVE BLOCK performed by Pilo Randolph MD at [address] [place] [address] Right     TOTAL KNEE ARTHROPLASTY  16    Dr Stephanie Rubio History     Socioeconomic History    Marital status:      Spouse name: Not on file    Number of children: 2    Years of education: Not on file    Highest education level: Not on file   Occupational History    Occupation: disabled   Social Needs    Financial resource strain: Not on file    Food insecurity:     Worry: Not on file     Inability: Not on file   [employer] needs:     Medical: Not on file     Non-medical: Not on file   Tobacco Use    Smoking status: Passive Smoke Exposure - Never Smoker    Smokeless tobacco: Never Used   Substance and Sexual Activity    Alcohol use: No     Alcohol/week: 0.0 standard drinks    Drug use: No    Sexual activity: Not Currently   Lifestyle    Physical activity: numbness and headaches. Hematological: Negative. Psychiatric/Behavioral: Negative. Negative for agitation, confusion, decreased concentration, sleep disturbance and suicidal ideas. The patient is not nervous/anxious. Objective:   /76   Pulse 71   Resp 20   LMP  (LMP Unknown)   SpO2 98%    Wt Readings from Last 3 Encounters:   02/05/20 240 lb (108.9 kg)   12/19/19 241 lb (109.3 kg)   12/06/19 232 lb (105.2 kg)     Physical Exam  Constitutional:       Appearance: She is well-developed. HENT:      Head: Normocephalic and atraumatic. Eyes:      Pupils: Pupils are equal, round, and reactive to light. Neck:      Musculoskeletal: Normal range of motion and neck supple. Cardiovascular:      Rate and Rhythm: Normal rate and regular rhythm. Heart sounds: No murmur. No friction rub. No gallop. Pulmonary:      Effort: Pulmonary effort is normal.      Breath sounds: Normal breath sounds. No wheezing or rales. Chest:      Chest wall: No tenderness. Abdominal:      General: Bowel sounds are normal. There is no distension. Palpations: Abdomen is soft. Tenderness: There is no abdominal tenderness. There is no guarding. Musculoskeletal: Normal range of motion. General: No tenderness or deformity. Skin:     General: Skin is warm and dry. Findings: No erythema or rash. Neurological:      Mental Status: She is alert and oriented to person, place, and time. Motor: Weakness present. Coordination: Coordination abnormal.      Gait: Gait abnormal.         Assessment and Plan:      1. Shortness of breath  2. Edema, unspecified type  3. Diastolic congestive heart failure, unspecified HF chronicity (HCC)  - Stable    -Continue diuretic as prescribed  -Daily weights, call if you gain 3 lbs in one day or 5 lbs.  in one week, or for increased edema, sob, cough, orthopnea, or chest pain  -Elevate BLE while in dependent position  -Avoid added salt; reviewed basic skin care.  -Compression socks on during day, off at night  -Maintain follow up with cardiology-    4. CKD (chronic kidney disease) stage 4, GFR 15-29 ml/min (HCC)  - stable disease process  - Had conversation about possiable future need for dialysis if GFR remains trending downward and how proper HTN and diabetic control can aid in maintaining kidney fiunction    5. Closed supracondylar fracture of right humerus with delayed healing, subsequent encounter  6. Other chronic pain  Controlled on current regime    Pt is tolerating current pain meds without adverse effects or over sedation. Lowest effective dose used. Pt advised to call and notify palliative care for any adverse effects or sedation  Pt is able to maintain adequate functional level and participate in ADLs  OARRS reviewed. There is no indication of aberrant behavior  Pt advised to call for increasing or uncontrolled pain. Risk vs benefit assessed. Pt educated on risk of addiction. Pt advised to take only as prescribed and not to change frequency of pain meds without consulting palliative care first.    7. History of type C viral hepatitis  - F/U with ID as scheduled    8. Palliative care patient  - Call for any questions, concerns or change in condition. Much support, guidance and active listening provided. There are no discontinued medications. Discussed with patient/surrogate: POC, Treatment risks/benefits, and alternatives including as noted above. All questions were answered. Gave much active listening, presence, and emotional support. Due to acuity, symptomatology and high-risk medication management,   I advised patient to Return in about 4 weeks (around 3/26/2020), or if symptoms worsen or fail to improve. Total Time 30 mins   > 50% Time Spent Counseling/Care coordination?  yes     Abigail Dias, LORETO - CNP    Collaborating physician: Dr. Roman Rand

## 2020-03-04 ENCOUNTER — OFFICE VISIT (OUTPATIENT)
Dept: GERIATRIC MEDICINE | Age: 60
End: 2020-03-04
Payer: MEDICARE

## 2020-03-04 PROCEDURE — 99316 NF DSCHRG MGMT 30 MIN+: CPT | Performed by: NURSE PRACTITIONER

## 2020-03-04 PROCEDURE — 3046F HEMOGLOBIN A1C LEVEL >9.0%: CPT | Performed by: NURSE PRACTITIONER

## 2020-03-04 PROCEDURE — G8482 FLU IMMUNIZE ORDER/ADMIN: HCPCS | Performed by: NURSE PRACTITIONER

## 2020-03-05 LAB
BASOPHILS ABSOLUTE: ABNORMAL
BASOPHILS RELATIVE PERCENT: 0.6 %
BUN BLDV-MCNC: 39 MG/DL
CALCIUM SERPL-MCNC: 9.7 MG/DL
CHLORIDE BLD-SCNC: 107 MMOL/L
CO2: 24 MMOL/L
CREAT SERPL-MCNC: 2.6 MG/DL
EOSINOPHILS ABSOLUTE: 0.2 /ΜL
EOSINOPHILS RELATIVE PERCENT: 3.5 %
GFR CALCULATED: 19
GLUCOSE BLD-MCNC: 178 MG/DL
HCT VFR BLD CALC: 27 % (ref 36–46)
HEMOGLOBIN: 9.1 G/DL (ref 12–16)
LYMPHOCYTES ABSOLUTE: 1.6 /ΜL
LYMPHOCYTES RELATIVE PERCENT: 22.9 %
MCH RBC QN AUTO: 28.2 PG
MCHC RBC AUTO-ENTMCNC: 33.6 G/DL
MCV RBC AUTO: 84 FL
MONOCYTES ABSOLUTE: 0.5 /ΜL
MONOCYTES RELATIVE PERCENT: 7.1 %
NEUTROPHILS ABSOLUTE: 4.6 /ΜL
NEUTROPHILS RELATIVE PERCENT: 65.9 %
PLATELET # BLD: 169 K/ΜL
PMV BLD AUTO: 10 FL
POTASSIUM SERPL-SCNC: 5.2 MMOL/L
RBC # BLD: 3.22 10^6/ΜL
SODIUM BLD-SCNC: 139 MMOL/L
WBC # BLD: 7 10^3/ML

## 2020-03-10 RX ORDER — LANOLIN ALCOHOL/MO/W.PET/CERES
CREAM (GRAM) TOPICAL
Qty: 180 TABLET | Refills: 4 | Status: SHIPPED | OUTPATIENT
Start: 2020-03-10 | End: 2020-03-17 | Stop reason: SDUPTHER

## 2020-03-17 RX ORDER — LANOLIN ALCOHOL/MO/W.PET/CERES
CREAM (GRAM) TOPICAL
Qty: 180 TABLET | Refills: 4 | Status: ON HOLD | OUTPATIENT
Start: 2020-03-17 | End: 2021-02-18

## 2020-03-17 RX ORDER — PREGABALIN 75 MG/1
CAPSULE ORAL
COMMUNITY
Start: 2020-03-02 | End: 2020-05-04 | Stop reason: SDUPTHER

## 2020-03-17 RX ORDER — SERTRALINE HYDROCHLORIDE 25 MG/1
TABLET, FILM COATED ORAL
COMMUNITY
Start: 2020-03-07 | End: 2020-04-06 | Stop reason: ALTCHOICE

## 2020-03-17 RX ORDER — LIRAGLUTIDE 6 MG/ML
INJECTION SUBCUTANEOUS
COMMUNITY
Start: 2015-09-04 | End: 2020-03-18

## 2020-03-17 RX ORDER — LOPERAMIDE HYDROCHLORIDE 2 MG/1
CAPSULE ORAL
Status: ON HOLD | COMMUNITY
Start: 2015-08-25 | End: 2020-05-24

## 2020-03-17 RX ORDER — NITROGLYCERIN 0.4 MG/1
TABLET SUBLINGUAL
Status: ON HOLD | COMMUNITY
Start: 2015-09-22 | End: 2022-07-16 | Stop reason: HOSPADM

## 2020-03-17 RX ORDER — PANTOPRAZOLE SODIUM 40 MG/1
TABLET, DELAYED RELEASE ORAL
COMMUNITY
Start: 2020-01-26 | End: 2020-03-18

## 2020-03-17 RX ORDER — SITAGLIPTIN 50 MG/1
TABLET, FILM COATED ORAL
COMMUNITY
Start: 2020-02-27 | End: 2020-03-18

## 2020-03-17 RX ORDER — PREDNISONE 10 MG/1
TABLET ORAL
COMMUNITY
Start: 2020-02-05 | End: 2020-03-18

## 2020-03-17 RX ORDER — LINAGLIPTIN 5 MG/1
5 TABLET, FILM COATED ORAL DAILY
Qty: 90 TABLET | Refills: 1 | Status: SHIPPED | OUTPATIENT
Start: 2020-03-17 | End: 2020-03-31 | Stop reason: ALTCHOICE

## 2020-03-17 NOTE — TELEPHONE ENCOUNTER
Appointment for 3/18/2020  has been canceled. The patient would like to set up a Phone visit. Med list reviewed Yes  Are refills needed Yes    Are you Hypertensive No Last reading no  Are you Diabetic Yes Last reading 179  Do you have chronic lung conditions No how is your breathing good.         Travel screening done Yes

## 2020-03-18 ENCOUNTER — TELEPHONE (OUTPATIENT)
Dept: PALLATIVE CARE | Age: 60
End: 2020-03-18

## 2020-03-18 ENCOUNTER — TELEMEDICINE (OUTPATIENT)
Dept: FAMILY MEDICINE CLINIC | Age: 60
End: 2020-03-18
Payer: MEDICARE

## 2020-03-18 PROCEDURE — 99442 PR PHYS/QHP TELEPHONE EVALUATION 11-20 MIN: CPT | Performed by: FAMILY MEDICINE

## 2020-03-18 NOTE — PROGRESS NOTES
LEFT KNEE TOTAL KNEE ARTHROPLASTY, SHAYNA, NERVE BLOCK performed by Poppy Guzman MD at [address] [place] [address] Right     TOTAL KNEE ARTHROPLASTY  05/19/16    Dr Maxwell Alcantar History     Socioeconomic History    Marital status:      Spouse name: Not on file    Number of children: 2    Years of education: Not on file    Highest education level: Not on file   Occupational History    Occupation: disabled   Social Needs    Financial resource strain: Not on file    Food insecurity     Worry: Not on file     Inability: Not on file   [employer] needs     Medical: Not on file     Non-medical: Not on file   Tobacco Use    Smoking status: Passive Smoke Exposure - Never Smoker    Smokeless tobacco: Never Used   Substance and Sexual Activity    Alcohol use: No     Alcohol/week: 0.0 standard drinks    Drug use: No    Sexual activity: Not Currently   Lifestyle    Physical activity     Days per week: Not on file     Minutes per session: Not on file    Stress: Not on file   Relationships    Social connections     Talks on phone: Not on file     Gets together: Not on file     Attends Restorationist service: Not on file     Active member of club or organization: Not on file     Attends meetings of clubs or organizations: Not on file     Relationship status: Not on file    Intimate partner violence     Fear of current or ex partner: Not on file     Emotionally abused: Not on file     Physically abused: Not on file     Forced sexual activity: Not on file   Other Topics Concern    Not on file   Social History Narrative    Born in [place], one of 5    Twin sister Reyes, very ill in 2018, [place] [id number]    Moved to [facility], , 2 children, one son and one daughter    Worked at [employer], as a nurse's aide    Disabled due to mental illness    Lived at [employer], was discharged, returned to independent living in 2017 in the daughter's house and has adjusted well    One son and one daughter, live in the same house with patient, Mercedes Ross pays the rent    [employer] reading (mistVerified Identity Pass)     Family History   Problem Relation Age of Onset    Cancer Mother 76        survived   [facility] Hypertension Father     Diabetes Sister     Mental Illness Sister      Allergies   Allergen Reactions    Codeine Hives     hives     Current Outpatient Medications   Medication Sig Dispense Refill    TRADJENTA 5 MG tablet Take 1 tablet by mouth daily 90 tablet 1    melatonin 3 MG TABS tablet TAKE 1 TABLET BY MOUTH EVERY NIGHT AS NEEDED FOR INSOMNIA 180 tablet 4    sertraline (ZOLOFT) 25 MG tablet       pregabalin (LYRICA) 75 MG capsule       nitroGLYCERIN (NITROSTAT) 0.4 MG SL tablet Place 1 tablet under the tongue every 5 minutes as needed for Chest pain      loperamide (IMODIUM) 2 MG capsule Take 1 capsule by mouth every 4 hours as needed for Diarrhea      amLODIPine (NORVASC) 2.5 MG tablet Take 2.5 mg by mouth daily      ARIPiprazole (ABILIFY) 5 MG tablet Take 5 mg by mouth daily      aspirin 81 MG tablet Take 81 mg by mouth daily      atorvastatin (LIPITOR) 40 MG tablet Take 40 mg by mouth daily      carvedilol (COREG) 6.25 MG tablet Take 6.25 mg by mouth 2 times daily (with meals)      clopidogrel (PLAVIX) 75 MG tablet Take 75 mg by mouth daily      isosorbide mononitrate (IMDUR) 60 MG extended release tablet Take 120 mg by mouth daily      magnesium oxide (MAG-OX) 400 MG tablet Take 400 mg by mouth daily      pantoprazole (PROTONIX) 20 MG tablet Take 20 mg by mouth daily      vitamin B-12 (CYANOCOBALAMIN) 100 MCG tablet TAKE 1 TABLET BY MOUTH DAILY 30 tablet 11    D3-1000 1000 units CAPS TAKE 1 CAPSULE BY MOUTH ONCE DAILY 90 capsule 2    pregabalin (LYRICA) 75 MG capsule Give one capsule po bid 60 capsule 1    traZODone (DESYREL) 50 MG tablet TAKE 1 TO 2 TABLETS BY MOUTH EVERY NIGHT AT BEDTIME AS NEEDED FOR INSOMNIA 180 tablet 1    sertraline (ZOLOFT) 50 MG tablet TAKE (1) TABLET BY MOUTH DAILY 30 tablet 5     No current facility-administered medications for this visit. PMH, Surgical Hx, Family Hx, and Social Hxreviewed and updated. Health Maintenance reviewed. Objective    There were no vitals filed for this visit. Physical Exam    Be alert and oriented x3. Affect appears flat. Memory appears intact. Speech somewhat slow but no flight of thought or tangential thought. Lab Results   Component Value Date    LABA1C 6.9 10/22/2019    LABA1C 6.9 09/03/2019    LABA1C 6.6 (H) 06/05/2019     Lab Results   Component Value Date    LABMICR 183.30 (H) 08/20/2019    CREATININE 2.6 03/05/2020     Lab Results   Component Value Date    ALT 13 11/22/2019    AST 18 11/22/2019     Lab Results   Component Value Date    CHOL 137 02/26/2019    TRIG 116 02/26/2019    HDL 55 02/26/2019    LDLCALC 59 02/26/2019        Assessment & Plan   Visit Diagnoses and Associated Orders     Coronary artery disease involving native heart with angina pectoris, unspecified vessel or lesion type ([address]    -  Primary    Stable and well-controlled on current meds. Patient will call for refills. Defer Imdur to cardiology         Chronic diastolic congestive heart failure ([address]        Stable and well-controlled on current meds. Followed by nephrology and cardiology. Essential hypertension        Appears to be stable and well-controlled on current meds. Pulmonary hypertension ([address]        Appears to be stable and well-controlled on current meds. Chronic painful diabetic neuropathy (HCC)        Stable, fair control. Continue Lyrica which will be prescribed through this office. Diabetic nephropathy with proteinuria ([address]             Uncontrolled type 2 diabetes mellitus with hyperglycemia (HCC)             Hemiparesis, left (HCC)        Stable and fairly well-controlled         CKD (chronic kidney disease) stage 4, GFR 15-29 ml/min ([facility])        Stable and well-controlled on current meds.

## 2020-03-19 ENCOUNTER — TELEPHONE (OUTPATIENT)
Dept: FAMILY MEDICINE CLINIC | Age: 60
End: 2020-03-19

## 2020-03-19 NOTE — TELEPHONE ENCOUNTER
[facility] called and stated that they were wondering if the provider would follow the patient for home health orders.

## 2020-03-26 VITALS — HEART RATE: 72 BPM | BODY MASS INDEX: 37.12 KG/M2 | WEIGHT: 237 LBS | TEMPERATURE: 98 F | RESPIRATION RATE: 20 BRPM

## 2020-03-26 NOTE — PROGRESS NOTES
Sarah Chris : 1960 DOS: 2020     [facility]    This is a discharge summary. She was here for PT/OT rehabilitation after she had fallen and had a right humerus fracture. She had gone home. She accidentally overdosed on narcotics. She was not taking them correctly. She does have a mental disability, also paranoid schizophrenia and then she returned back here for more physical therapy. She had had originally some encephalopathy from the narcotic overdose. She has DM2 with neuropathy, paranoid schizophrenia, tardive dyskinesia, CHF, hyperlipidemia, hypertension, GERD, DJD, left hemiparesis with ataxia, seizure disorder. While she was here at this time, she did follow up with cardiac. Her CAD is stable. She is possibly New York Heart class II. Lipids are stable, they are good. Her blood sugars are under good control. She had a few anions once because of her noncompliance with carbohydrate p.o. intake. She is ambulatory with a walker. She had had some occasional wheezing on and off. She took NSAIDs for a short time and that improved. She is full weight-bearing of her right humerus in the right arm and she is doing well with that. No problems with BMs, eating and drinking well. No urinary issues. No change in vision or hearing. No headaches. No nausea, vomiting. No chest pain, shortness of breath. MEDICATIONS AND ALLERGIES: Reviewed in the nursing facility chart. She is allergic to codeine. Medications at discharge include aspirin 81 daily, atorvastatin 40 mg daily, Basaglar is 30 units daily, carvedilol 6.25 b.i.d., clopidogrel 25 mg daily, vitamin D3 1000 units daily, Humalog rapidly acting 12 units consistent with 3 times at meals, hold if her blood sugar is less than 120. PHYSICAL EXAMINATION: Vital Signs: 98, 72, 20, weight is up to 237 pounds. She is sitting up in a chair in her room. She is in no acute distress. She is awake and alert, oriented x3.  Her EOMs are home taxing and needs assistance of one to leave home. Certification for [address]:  Based on the above findings, I certify that this patient meets the criteria for being homebound and has the skilled need for intermittent care as indicated above. The patient is under my care and I have intitated the request for the Plan of Care. A physician and/or non-physician practitioner and collaborating physician will follow this patient and periodically review the POC.     Signature:      Date: 3/4/2020    Dr. Chavez Hoffmann collaborator          Electronically Signed By: ALESHIA Christianson GNP-BC on 03/23/2020 19:05:12  ______________________________  ALESHIA Christianson GNP-BC  /[id number]  D: 03/22/2020 17:20:07  T: 03/23/2020 00:59:27    cc: - [phone]

## 2020-03-27 ENCOUNTER — TELEPHONE (OUTPATIENT)
Dept: FAMILY MEDICINE CLINIC | Age: 60
End: 2020-03-27

## 2020-03-27 NOTE — TELEPHONE ENCOUNTER
[facility] would like to confirm unit increase for Basaglar. Pt was on 50 units at night. Pt states she is taking 30 units twice daily, total of 60 units a day. If this is correct, pharmacy is requesting new refill encounter.

## 2020-03-27 NOTE — TELEPHONE ENCOUNTER
She is followed by Chad Aceves in endocrine. That dosing does not correlate with any listed in her chart.

## 2020-03-27 NOTE — TELEPHONE ENCOUNTER
Rx request   Requested Prescriptions     Pending Prescriptions Disp Refills    nystatin (MYCOSTATIN) [id number] UNIT/GM powder 60 g 3     Sig: APPLY TO ABDOMINAL FOLDS EVERY 12 HOURS AS NEEDED     LOV 8/13/2019  Next Visit Date:  Future Appointments   Date Time Provider Aminata Cortes   5/20/2020 12:30 PM Brianna Reddy  [address]

## 2020-03-30 RX ORDER — NYSTATIN 100000 [USP'U]/G
POWDER TOPICAL
Qty: 60 G | Refills: 3 | Status: ON HOLD | OUTPATIENT
Start: 2020-03-30 | End: 2020-05-24

## 2020-03-31 ENCOUNTER — VIRTUAL VISIT (OUTPATIENT)
Dept: ENDOCRINOLOGY | Age: 60
End: 2020-03-31
Payer: MEDICARE

## 2020-03-31 PROCEDURE — 99442 PR PHYS/QHP TELEPHONE EVALUATION 11-20 MIN: CPT | Performed by: PHYSICIAN ASSISTANT

## 2020-03-31 RX ORDER — BLOOD-GLUCOSE TRANSMITTER
1 EACH MISCELLANEOUS CONTINUOUS
Qty: 1 EACH | Refills: 3 | Status: SHIPPED | OUTPATIENT
Start: 2020-03-31 | End: 2020-06-16

## 2020-03-31 RX ORDER — BLOOD-GLUCOSE SENSOR
1 EACH MISCELLANEOUS CONTINUOUS
Qty: 3 EACH | Refills: 11 | Status: SHIPPED | OUTPATIENT
Start: 2020-03-31 | End: 2020-06-16

## 2020-03-31 RX ORDER — BLOOD-GLUCOSE,RECEIVER,CONT
1 EACH MISCELLANEOUS CONTINUOUS
Qty: 1 DEVICE | Refills: 0 | Status: SHIPPED | OUTPATIENT
Start: 2020-03-31 | End: 2020-06-16

## 2020-03-31 ASSESSMENT — ENCOUNTER SYMPTOMS
SORE THROAT: 0
RHINORRHEA: 0
EYE REDNESS: 0
NAUSEA: 0
DIARRHEA: 0
SINUS PRESSURE: 0
ABDOMINAL PAIN: 0
COUGH: 0
EYE PAIN: 0
VOMITING: 0
WHEEZING: 0
SHORTNESS OF BREATH: 0

## 2020-03-31 NOTE — PATIENT INSTRUCTIONS
Endocrinology-diabetes    1. Check your blood sugars 4 times a day, before meals and at night  2. Document these numbers and a blood glucose log and bring them with you to your follow-up appointment. 3. Do not take your mealtime insulin if your blood sugars less than 100  4. Call our office if you have blood sugars less than 80 or greater then 200 on two or more occasions  5. Call our office if you have any questions regarding your blood sugars or insulin dosing regiment  6. Signs of low blood sugar include sweating , heart racing, dizziness and weakness. Check your blood sugar if you have any of these symptoms. 7. Increase Lantus 35 units at night   8. Continue Novolog 18 units morning, 8 units lunch, 18 units at dinner   9. Discontinue Tradjenta   10. Pt wants to start a vegan diet   11.  Dexcom G6 ordered

## 2020-03-31 NOTE — PROGRESS NOTES
3/31/2020    TELEHEALTH EVALUATION -- Audio/Visual (During [facility]- public health emergency)    Due to Manuel 19 outbreak, patient's office visit was converted to a virtual visit. Patient was contacted and agreed to proceed with a virtual visit via Telephone Visit total visit time 16 minutes  The risks and benefits of converting to a virtual visit were discussed in light of the current infectious disease epidemic. Patient also understood that insurance coverage and co-pays are up to their individual insurance plans. ASSESSMENT:   Diagnosis Orders   1. Uncontrolled type 2 diabetes mellitus with hyperglycemia ([address]     2. Coronary artery disease involving native heart with angina pectoris, unspecified vessel or lesion type ([address]         PLAN:  1. Increase Lantus 35 units at night   2. Continue Novolog 18 units morning, 8 units lunch, 18 units at dinner   3. Discontinue Tradjenta   4. Pt wants to start a vegan diet   5. Dexcom G6 ordered     HPI:    Sonal Moore (:  1960) has requested an audio/video evaluation for the following concern(s):  She presents for her follow-up diabetic visit. She has type 2 diabetes mellitus. The initial diagnosis of diabetes was made 25 years ago. Her disease course has been stable. Hypoglycemia symptoms include confusion (with hypoglycemia), sweats (with hypoglycemia) and tremors (with hypoglycemia). Pertinent negatives for hypoglycemia include no dizziness or headaches. Associated symptoms include foot paresthesias and weakness. Pertinent negatives for diabetes include no chest pain and no fatigue. Hypoglycemia complications include required assistance. Diabetic complications include heart disease and peripheral neuropathy. Risk factors for coronary artery disease include dyslipidemia, hypertension and family history. Current diabetic treatment includes insulin injections. She is compliant with treatment all of the time.  She is currently taking insulin pre-breakfast, pre-lunch, pre-dinner and at bedtime. Insulin injections are given by patient and adult caretaker. Rotation sites for injection include the abdominal wall. She is following a generally unhealthy diet. Meal planning includes avoidance of concentrated sweets. She rarely participates in exercise. She monitors blood glucose at home 5+ x per day (Multiple insulin dosing changes beign made ). Blood glucose monitoring compliance is good. There is no change in her home blood glucose trend. Her overall blood glucose range is 140-180 mg/dl. An ACE inhibitor/angiotensin II receptor blocker is being taken. She sees a podiatrist.Eye exam is current. Review of Systems   Constitutional: Negative for chills, fatigue and fever. HENT: Negative for congestion, ear pain, postnasal drip, rhinorrhea, sinus pressure and sore throat. Eyes: Negative for pain and redness. Respiratory: Negative for cough, shortness of breath and wheezing. Cardiovascular: Negative for chest pain, palpitations and leg swelling. Gastrointestinal: Negative for abdominal pain, diarrhea, nausea and vomiting. Genitourinary: Negative for difficulty urinating. Musculoskeletal: Negative for arthralgias. Skin: Negative for rash. Neurological: Negative for dizziness and headaches. Results for Volodymyr Stewart (MRN [id number]) as of 3/31/2020 11:03   Ref.  Range 3/5/2020 00:00   Sodium Latest Units: mmol/L 139   Potassium Latest Units: mmol/L 5.2   Chloride Latest Units: mmol/L 107   CO2 Latest Units: mmol/L 24   BUN Latest Units: mg/dL 39   Creatinine Unknown 2.6   Gfr Calculated Unknown 19   Glucose Latest Units: mg/dL 178   Calcium Latest Units: mg/dL 9.7   WBC Latest Units: 10^3/mL 7.0   RBC Latest Units: 10^6/µL 3.22   Hemoglobin Quant Latest Ref Range: 12.0 - 16.0 g/dL 9.1 (A)   Hematocrit Latest Ref Range: 36 - 46 % 27.0 (A)   MCV Latest Units: fL 84.0   MCH Latest Units: pg 28.2   MCHC Latest Units: g/dL 33.6   MPV Latest Units: fL extended release tablet Take 120 mg by mouth daily  Historical Provider, MD   magnesium oxide (MAG-OX) 400 MG tablet Take 400 mg by mouth daily  Historical Provider, MD   pantoprazole (PROTONIX) 20 MG tablet Take 20 mg by mouth daily  Historical Provider, MD   vitamin B-12 (CYANOCOBALAMIN) 100 MCG tablet TAKE 1 TABLET BY MOUTH DAILY  Ember Henderson MD   D3-1000 1000 units CAPS TAKE 1 CAPSULE BY MOUTH ONCE DAILY  Lida Rodriguez MD   pregabalin (LYRICA) 75 MG capsule Give one capsule po bid  LORETO Long - ETHAN   traZODone (DESYREL) 50 MG tablet TAKE 1 TO 2 TABLETS BY MOUTH EVERY NIGHT AT BEDTIME AS NEEDED FOR INSOMNIA  Lida Rodriguez MD   sertraline (ZOLOFT) 50 MG tablet TAKE (1) TABLET BY MOUTH DAILY  Lida Rodriguez MD       Social History     Tobacco Use    Smoking status: Passive Smoke Exposure - Never Smoker    Smokeless tobacco: Never Used   Substance Use Topics    Alcohol use: No     Alcohol/week: 0.0 standard drinks    Drug use: No        Allergies   Allergen Reactions    Codeine Hives     hives   ,   Past Medical History:   Diagnosis Date    Anxiety     CAD S/P percutaneous coronary angioplasty 2015, 2018    stent per dr Sarah Kate, [address] CHF [facility]congestive heart failure) [facility]     Diabetic nephropathy with proteinuria ([facility] 2014    DJD (degenerative joint disease) of knee     Dr Stephens Bumpers GERD (gastroesophageal reflux disease)     Hemip[facility] left ([facility] 2013    [facility] ([facility]    History of seizures     History of type C viral hepatitis     HTN (hypertension)     Hyperlipidemia     Impaired mobility and activities of daily living     Mediastinal lymphadenopathy 2013    Michelle Jernigan    Metabolic syndrome     Neurogenic urinary incontinence 2013    [facility]uropathy in diabetes [facility]     Obesity (BMI 30-39. 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)     Type 2 diabetes mellitus with renal manifestations, controlled ([facility] 2015    Insulin dependent, Dr Torrence Blizzard Urinary incontinence due to cognitive impairment 2013    Vitamin D deficiency 2014   ,   Family History   Problem Relation Age of Onset    Cancer Mother 76        survived   Lonita Apt Hypertension Father     Diabetes Sister     Mental Illness Sister          Due to this being a TeleHealth encounter, evaluation of the following organ systems is limited: Vitals/Constitutional/EENT/Resp/CV/GI//MS/Neuro/Skin/Heme-Lymph-Imm. No follow-ups on file. An  electronic signature was used to authenticate this note. --MARCELO Montes on 3/31/2020 at 11:02 AM        Pursuant to the emergency declaration under the [facility] waiver authority and the [employer] Act, this Virtual  Visit was conducted, with patient's consent, to reduce the patient's risk of exposure to COVID-19 and provide continuity of care for an established patient. Services were provided through a video synchronous discussion virtually to substitute for in-person clinic visit.

## 2020-04-01 RX ORDER — PEN NEEDLE, DIABETIC 30 GX5/16"
NEEDLE, DISPOSABLE MISCELLANEOUS
Qty: 100 EACH | Refills: 10 | Status: SHIPPED | OUTPATIENT
Start: 2020-04-01 | End: 2021-03-11 | Stop reason: SDUPTHER

## 2020-04-01 NOTE — TELEPHONE ENCOUNTER
Pharmacy requesting medication refill.  Please approve or deny this request.    Rx requested:  Requested Prescriptions     Pending Prescriptions Disp Refills    SURE COMFORT PEN NEEDLES 30G X 8 MM [facility] [Pharmacy Med Name: Angelina Zhang [id number] PEN NL 30GX 5/16\" NEDL] 100 each 10     Sig: USE AS DIRECTED FIVE TIMES A DAY         Last Office Visit:   8/13/2019      Next Visit Date:  Future Appointments   Date Time Provider Aminata Cortes   5/20/2020 12:30 PM Yola Kellogg MD [facility]   6/22/2020 11:00 AM Hannah Du, [address] [place] [address]

## 2020-04-07 NOTE — TELEPHONE ENCOUNTER
[facility] calling to request new Rx of Basaglar with current directions. Send Rx to listed 3 [facility].

## 2020-04-08 RX ORDER — INSULIN GLARGINE 100 [IU]/ML
INJECTION, SOLUTION SUBCUTANEOUS
Qty: 15 ML | Refills: 3 | Status: SHIPPED | OUTPATIENT
Start: 2020-04-08 | End: 2020-06-16

## 2020-04-14 LAB
ALBUMIN SERPL-MCNC: 3.6 G/DL (ref 3.5–4.6)
ALP BLD-CCNC: 124 U/L (ref 40–130)
ALT SERPL-CCNC: 16 U/L (ref 0–33)
ANION GAP SERPL CALCULATED.3IONS-SCNC: 15 MEQ/L (ref 9–15)
AST SERPL-CCNC: 15 U/L (ref 0–35)
BASOPHILS ABSOLUTE: 0 K/UL (ref 0–0.2)
BASOPHILS RELATIVE PERCENT: 0.4 %
BILIRUB SERPL-MCNC: 0.3 MG/DL (ref 0.2–0.7)
BUN BLDV-MCNC: 52 MG/DL (ref 8–23)
CALCIUM SERPL-MCNC: 9.6 MG/DL (ref 8.5–9.9)
CHLORIDE BLD-SCNC: 104 MEQ/L (ref 95–107)
CO2: 20 MEQ/L (ref 20–31)
CREAT SERPL-MCNC: 2.67 MG/DL (ref 0.5–0.9)
EOSINOPHILS ABSOLUTE: 0.2 K/UL (ref 0–0.7)
EOSINOPHILS RELATIVE PERCENT: 3 %
GFR AFRICAN AMERICAN: 21.9
GFR NON-AFRICAN AMERICAN: 18.1
GLOBULIN: 3.3 G/DL (ref 2.3–3.5)
GLUCOSE BLD-MCNC: 258 MG/DL (ref 70–99)
HCT VFR BLD CALC: 29.4 % (ref 37–47)
HEMOGLOBIN: 10.1 G/DL (ref 12–16)
LYMPHOCYTES ABSOLUTE: 1.7 K/UL (ref 1–4.8)
LYMPHOCYTES RELATIVE PERCENT: 20.2 %
MCH RBC QN AUTO: 29.7 PG (ref 27–31.3)
MCHC RBC AUTO-ENTMCNC: 34.3 % (ref 33–37)
MCV RBC AUTO: 86.5 FL (ref 82–100)
MONOCYTES ABSOLUTE: 0.5 K/UL (ref 0.2–0.8)
MONOCYTES RELATIVE PERCENT: 6.4 %
NEUTROPHILS ABSOLUTE: 5.8 K/UL (ref 1.4–6.5)
NEUTROPHILS RELATIVE PERCENT: 70 %
PDW BLD-RTO: 14.5 % (ref 11.5–14.5)
PLATELET # BLD: 174 K/UL (ref 130–400)
POTASSIUM SERPL-SCNC: 5 MEQ/L (ref 3.4–4.9)
RBC # BLD: 3.4 M/UL (ref 4.2–5.4)
SODIUM BLD-SCNC: 139 MEQ/L (ref 135–144)
TOTAL PROTEIN: 6.9 G/DL (ref 6.3–8)
WBC # BLD: 8.3 K/UL (ref 4.8–10.8)

## 2020-04-17 ENCOUNTER — VIRTUAL VISIT (OUTPATIENT)
Dept: PALLATIVE CARE | Age: 60
End: 2020-04-17
Payer: MEDICARE

## 2020-04-17 PROCEDURE — 99442 PR PHYS/QHP TELEPHONE EVALUATION 11-20 MIN: CPT | Performed by: FAMILY MEDICINE

## 2020-04-17 ASSESSMENT — ENCOUNTER SYMPTOMS
VOMITING: 0
VOICE CHANGE: 0
SORE THROAT: 0
EYES NEGATIVE: 1
SINUS PAIN: 0
CONSTIPATION: 0
DIARRHEA: 0
ALLERGIC/IMMUNOLOGIC NEGATIVE: 1
CHEST TIGHTNESS: 0
NAUSEA: 0
COUGH: 0
TROUBLE SWALLOWING: 0
BACK PAIN: 0
SHORTNESS OF BREATH: 1
WHEEZING: 0
COLOR CHANGE: 0
ABDOMINAL PAIN: 0
GASTROINTESTINAL NEGATIVE: 1

## 2020-04-17 NOTE — PROGRESS NOTES
([facility]     Obesity (BMI 30-39. 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 Melody Pedroza      x1 Dr. Robert Trejo, Dr Melvina Zimmerman CATH LAB PROCEDURE  10/02/2019    HYSTERECTOMY, TOTAL ABDOMINAL      one ovary intact, Dr Zamzam Wallis, menorrhagia    MS TOTAL KNEE ARTHROPLASTY Left 2018    LEFT KNEE TOTAL KNEE ARTHROPLASTY, SHAYNA, NERVE BLOCK performed by Rock Eusebio MD at [address] [place] [address] Right     TOTAL KNEE ARTHROPLASTY  16    Dr Esvin Barnett History     Socioeconomic History    Marital status:      Spouse name: Not on file    Number of children: 2    Years of education: Not on file    Highest education level: Not on file   Occupational History    Occupation: disabled   Social Needs    Financial resource strain: Not on file    Food insecurity     Worry: Not on file     Inability: Not on file   [employer] needs     Medical: Not on file     Non-medical: Not on file   Tobacco Use    Smoking status: Passive Smoke Exposure - Never Smoker    Smokeless tobacco: Never Used   Substance and Sexual Activity    Alcohol use: No     Alcohol/week: 0.0 standard drinks    Drug use: No    Sexual activity: Not Currently   Lifestyle    Physical activity     Days per week: Not on file     Minutes per session: Not on file    Stress: Not on file   Relationships    Social connections     Talks on phone: Not on file     Gets together: Not on file     Attends [employer] service: Not on file     Active member of club or organization: Not on file     Attends meetings of clubs or organizations: Not on file     Relationship status: Not on file    Intimate partner violence     Fear of current or ex partner: Not on file     Emotionally abused: Not on file     Physically abused: Not on file     Forced sexual activity: Not on file   Other Topics Concern    Not on file   Social History Narrative    Born in [place], one of 5    Twin sister Reyes, very ill in 2018, [place] [id number]    Moved to [facility], , 2 children, one son and one daughter    Worked at [employer], as a nurse's aide    Disabled due to mental illness    Lived at [employer], was discharged, returned to independent living in 2017 in the daughter's house and has adjusted well    One son and one daughter, live in the same house with patient, Herb Asencio pays the rent    Hobbies reading ([employer])     Family History   Problem Relation Age of Onset    Cancer Mother 76        survived   Larson Hypertension Father     Diabetes Sister     Mental Illness Sister      Allergies   Allergen Reactions    Codeine Hives     hives     Current Outpatient Medications on File Prior to Visit   Medication Sig Dispense Refill    BASAGLAR KWIKPEN 100 UNIT/ML injection pen Inject 35 units nightly 15 mL 3    sertraline (ZOLOFT) 50 MG tablet TAKE (1) TABLET BY MOUTH DAILY 90 tablet 0    SURE COMFORT PEN NEEDLES 30G X 8 MM MISC USE AS DIRECTED FIVE TIMES A  each 10    Continuous Blood Gluc  (DEXCOM G6 ) CORETTA 1 Device by Does not apply route continuous 1 Device 0    Continuous Blood Gluc Sensor (DEXCOM G6 SENSOR) MISC 1 Device by Does not apply route continuous 3 each 11    Continuous Blood Gluc Transmit (DEXCOM G6 TRANSMITTER) MISC 1 Device by Does Other chronic pain  Controlled on current regime. F/U with ortho as scheduled    7. History of type C viral hepatitis  - F/U with ID as scheduled    8. Palliative care patient  - Call for any questions, concerns or change in condition. Much support, guidance and active listening provided. There are no discontinued medications. Discussed with patient/surrogate: POC, Treatment risks/benefits, and alternatives including as noted above. All questions were answered. Gave much active listening, presence, and emotional support. Due to acuity, symptomatology and high-risk medication management,   I advised patient to Return in about 4 weeks (around 5/15/2020), or if symptoms worsen or fail to improve. Total Time 20 mins   > 50% Time Spent Counseling/Care coordination?  yes     LORETO Rodriguez - CNP    Collaborating physician: Dr. Juanjose Wilson

## 2020-04-30 RX ORDER — PREGABALIN 75 MG/1
CAPSULE ORAL
Qty: 60 CAPSULE | Refills: 5 | OUTPATIENT
Start: 2020-04-30

## 2020-04-30 RX ORDER — TRAZODONE HYDROCHLORIDE 50 MG/1
TABLET ORAL
Qty: 180 TABLET | Refills: 10 | OUTPATIENT
Start: 2020-04-30

## 2020-04-30 RX ORDER — ATORVASTATIN CALCIUM 40 MG/1
TABLET, FILM COATED ORAL
Qty: 90 TABLET | Refills: 10 | OUTPATIENT
Start: 2020-04-30

## 2020-04-30 RX ORDER — ASPIRIN 81 MG/1
TABLET, COATED ORAL
Qty: 90 TABLET | Refills: 10 | OUTPATIENT
Start: 2020-04-30

## 2020-05-01 RX ORDER — ARIPIPRAZOLE 5 MG/1
TABLET ORAL
Qty: 30 TABLET | Refills: 0 | Status: SHIPPED | OUTPATIENT
Start: 2020-05-01 | End: 2020-06-05 | Stop reason: SDUPTHER

## 2020-05-01 NOTE — TELEPHONE ENCOUNTER
This is the first fill of Abilify since establishing care on 4/1/2020    Next visit 5/20/2020      Rx request   Requested Prescriptions     Pending Prescriptions Disp Refills    ARIPiprazole (ABILIFY) 5 MG tablet [Pharmacy Med Name: ARIPIPRAZOLE 5 MG TABS 5 TAB] 90 tablet 1     Sig: TAKE 1 TABLET BY MOUTH DAILY     LOV 8/13/2019  Next Visit Date:  Future Appointments   Date Time Provider Aminata Cortes   5/12/2020  3:00 PM Maria A Gray APRN - CNP [facility] [address] [facility]   5/20/2020 12:30 PM Chanda Noguera MD [facility]   6/22/2020 11:00 AM Sofiya Fields, [address] [place] [address]

## 2020-05-04 RX ORDER — PREGABALIN 75 MG/1
75 CAPSULE ORAL 2 TIMES DAILY
Qty: 180 CAPSULE | Refills: 0 | Status: SHIPPED | OUTPATIENT
Start: 2020-05-04 | End: 2020-06-01

## 2020-05-04 RX ORDER — TRAZODONE HYDROCHLORIDE 50 MG/1
50 TABLET ORAL NIGHTLY
Qty: 90 TABLET | Refills: 0 | Status: ON HOLD | OUTPATIENT
Start: 2020-05-04 | End: 2020-05-24

## 2020-05-04 RX ORDER — ATORVASTATIN CALCIUM 40 MG/1
40 TABLET, FILM COATED ORAL DAILY
Qty: 90 TABLET | Refills: 3 | Status: SHIPPED | OUTPATIENT
Start: 2020-05-04 | End: 2021-04-28 | Stop reason: SDUPTHER

## 2020-05-13 ENCOUNTER — TELEPHONE (OUTPATIENT)
Dept: ENDOCRINOLOGY | Age: 60
End: 2020-05-13

## 2020-05-13 ENCOUNTER — VIRTUAL VISIT (OUTPATIENT)
Dept: PALLATIVE CARE | Age: 60
End: 2020-05-13
Payer: MEDICARE

## 2020-05-13 PROCEDURE — 99442 PR PHYS/QHP TELEPHONE EVALUATION 11-20 MIN: CPT | Performed by: FAMILY MEDICINE

## 2020-05-13 ASSESSMENT — ENCOUNTER SYMPTOMS
SHORTNESS OF BREATH: 1
WHEEZING: 0
SINUS PAIN: 0
ALLERGIC/IMMUNOLOGIC NEGATIVE: 1
VOMITING: 0
SORE THROAT: 0
COUGH: 0
COLOR CHANGE: 0
ABDOMINAL PAIN: 0
BACK PAIN: 0
CONSTIPATION: 0
VOICE CHANGE: 0
TROUBLE SWALLOWING: 0
EYES NEGATIVE: 1
CHEST TIGHTNESS: 0
DIARRHEA: 0
NAUSEA: 0
GASTROINTESTINAL NEGATIVE: 1

## 2020-05-13 NOTE — PROGRESS NOTES
Subjective:      Patient Id: Seen via TV for symptom management. She was accompanied to the appointment by: self. Chief Complaint   Patient presents with    Shortness of Breath    Pain    Leg Swelling     Time spent with Patient: 20mins  Patient was made aware he will be billed for telephone service. Pt presents via telephonic contact due to COVID-19 pandemic emergency protocol. Deana Chen is a 56 y.o. female seen and examined for symptomatic mangement related to CHF and debility post fx. Yue Vogt has complex medical history that includes DM2, seizures, Hep C, HTN, neuropathy, Paranoid schizophrenia, and Right humerus fracture. Pt is calm, cooperative and in NAD. Edema at baseline. Taking diuretics as ordered. Does not use home O2. Denies excessive fatigue, fever, chills, myalgias, increased sputum production or cough, nausea, vomiting, chest pain, or orthopnea. States pain is well controlled on current regime. Rates pain at a 0/ 10 and states it occurs in her right humerus and BLE with neuropathy. Describes pain as a intermittent ache. Currently taking tylenol PRN with great relief. Denies Sedation, Constipation, or other adverse effects on current regime.       Past Medical History:   Diagnosis Date    Anxiety     CAD S/P percutaneous coronary angioplasty 2015, 2018    stent per dr Darling Hoang, [address] CHF (congestive heart failure) ([facility]     Diabetic nephropathy with proteinuria ([facility] 2014    DJD (degenerative joint disease) of knee     Dr Laila Morales GERD (gastroesophageal reflux disease)     Hemiparesis, left ([facility] 2013    [facility] ([facility]    History of seizures     History of type C viral hepatitis     HTN (hypertension)     Hyperlipidemia     Impaired mobility and activities of daily living     Mediastinal lymphadenopathy 2013    Trini Llanes    Metabolic syndrome     Neurogenic urinary incontinence 2013    Neuropathy in diabetes for shortness of breath. Negative for cough, chest tightness and wheezing. Cardiovascular: Positive for leg swelling. Negative for chest pain and palpitations. Gastrointestinal: Negative. Negative for abdominal pain, constipation, diarrhea, nausea and vomiting. Endocrine: Negative. Negative for polydipsia, polyphagia and polyuria. Genitourinary: Negative. Negative for dysuria, flank pain, frequency and urgency. Musculoskeletal: Positive for arthralgias, gait problem and joint swelling. Negative for back pain and myalgias. Skin: Negative. Negative for color change and rash. Allergic/Immunologic: Negative. Neurological: Positive for weakness. Negative for dizziness, tremors, seizures, syncope, speech difficulty, numbness and headaches. Hematological: Negative. Psychiatric/Behavioral: Negative. Negative for agitation, confusion, decreased concentration, sleep disturbance and suicidal ideas. The patient is not nervous/anxious. Objective:   LMP  (LMP Unknown)    Wt Readings from Last 3 Encounters:   03/26/20 237 lb (107.5 kg)   02/05/20 240 lb (108.9 kg)   12/19/19 241 lb (109.3 kg)     Physical Exam  Pulmonary:      Effort: Pulmonary effort is normal.   Neurological:      Mental Status: She is alert and oriented to person, place, and time. Assessment and Plan:      1. Shortness of breath  2. Edema, unspecified type  3. Diastolic congestive heart failure, unspecified HF chronicity (HCC)  - Stable    -Continue diuretic as prescribed  -Daily weights, call if you gain 3 lbs in one day or 5 lbs. in one week, or for increased edema, sob, cough, orthopnea, or chest pain  -Elevate BLE while in dependent position  -Avoid added salt; reviewed basic skin care.  -Compression socks on during day, off at night  -Maintain follow up with cardiology-    4. CKD (chronic kidney disease) stage 4, GFR 15-29 ml/min ([facility])  - stable disease process    5.  Closed supracondylar fracture of right humerus with delayed healing, subsequent encounter  6. Other chronic pain  Controlled on current regime. F/U with ortho as scheduled    7. History of type C viral hepatitis  - F/U with ID as scheduled    8. Palliative care patient  - Call for any questions, concerns or change in condition. Much support, guidance and active listening provided. There are no discontinued medications. Discussed with patient/surrogate: POC, Treatment risks/benefits, and alternatives including as noted above. All questions were answered. Gave much active listening, presence, and emotional support. Due to acuity, symptomatology and high-risk medication management,   I advised patient to Return in about 4 weeks (around 6/10/2020), or if symptoms worsen or fail to improve. Total Time 20 mins   > 50% Time Spent Counseling/Care coordination?  yes     LORETO Licea - CNP    Collaborating physician: Dr. Chay Andino

## 2020-05-14 ENCOUNTER — TELEPHONE (OUTPATIENT)
Dept: ENDOCRINOLOGY | Age: 60
End: 2020-05-14

## 2020-05-14 RX ORDER — INSULIN GLARGINE 100 [IU]/ML
INJECTION, SOLUTION SUBCUTANEOUS
Qty: 15 PEN | Refills: 3 | Status: ON HOLD
Start: 2020-05-14 | End: 2020-06-15 | Stop reason: HOSPADM

## 2020-05-20 ENCOUNTER — VIRTUAL VISIT (OUTPATIENT)
Dept: FAMILY MEDICINE CLINIC | Age: 60
End: 2020-05-20
Payer: MEDICARE

## 2020-05-20 PROCEDURE — 3044F HG A1C LEVEL LT 7.0%: CPT | Performed by: FAMILY MEDICINE

## 2020-05-20 PROCEDURE — 99214 OFFICE O/P EST MOD 30 MIN: CPT | Performed by: FAMILY MEDICINE

## 2020-05-20 PROCEDURE — 2022F DILAT RTA XM EVC RTNOPTHY: CPT | Performed by: FAMILY MEDICINE

## 2020-05-20 PROCEDURE — 3017F COLORECTAL CA SCREEN DOC REV: CPT | Performed by: FAMILY MEDICINE

## 2020-05-20 PROCEDURE — G8427 DOCREV CUR MEDS BY ELIG CLIN: HCPCS | Performed by: FAMILY MEDICINE

## 2020-05-20 RX ORDER — SULFAMETHOXAZOLE AND TRIMETHOPRIM 800; 160 MG/1; MG/1
1 TABLET ORAL 2 TIMES DAILY
Qty: 10 TABLET | Refills: 0 | Status: ON HOLD | OUTPATIENT
Start: 2020-05-20 | End: 2020-05-24

## 2020-05-20 NOTE — PROGRESS NOTES
tablet Place 1 tablet under the tongue every 5 minutes as needed for Chest pain  Historical Provider, MD   loperamide (IMODIUM) 2 MG capsule Take 1 capsule by mouth every 4 hours as needed for Diarrhea  Historical Provider, MD   amLODIPine (NORVASC) 2.5 MG tablet Take 2.5 mg by mouth daily  Historical Provider, MD   carvedilol (COREG) 6.25 MG tablet Take 6.25 mg by mouth 2 times daily (with meals)  Historical Provider, MD   clopidogrel (PLAVIX) 75 MG tablet Take 75 mg by mouth daily  Historical Provider, MD   isosorbide mononitrate (IMDUR) 60 MG extended release tablet Take 120 mg by mouth daily  Historical Provider, MD   magnesium oxide (MAG-OX) 400 MG tablet Take 400 mg by mouth daily  Historical Provider, MD   pantoprazole (PROTONIX) 20 MG tablet Take 20 mg by mouth daily  Historical Provider, MD   vitamin B-12 (CYANOCOBALAMIN) 100 MCG tablet TAKE 1 TABLET BY MOUTH DAILY  Steff Plascencia MD   D3-1000 1000 units CAPS TAKE 1 CAPSULE BY MOUTH ONCE DAILY  Delvis Grossman MD   pregabalin (LYRICA) 75 MG capsule Give one capsule po bid  LORETO Bond - CNP       Social History     Tobacco Use    Smoking status: Passive Smoke Exposure - Never Smoker    Smokeless tobacco: Never Used   Substance Use Topics    Alcohol use: No     Alcohol/week: 0.0 standard drinks    Drug use: No        Allergies   Allergen Reactions    Codeine Hives     hives   ,   Past Medical History:   Diagnosis Date    Anxiety     CAD S/P percutaneous coronary angioplasty 2015, 2018    stent per dr Cuca Reynaga    [facility]) [facility]     Diabetic nephropathy with proteinuria ([facility] 2014    DJD (degenerative joint disease) of knee     Dr All Shankar GERD (gastroesophageal reflux disease)     Hemiparesis, left ([facility] 2013    [facility] ([facility]    History of seizures     History of type C viral hepatitis     HTN (hypertension)     Hyperlipidemia     Impaired mobility and homes.    --Jerod Marin MD on 5/20/2020 at 1:20 PM    An electronic signature was used to authenticate this note.

## 2020-05-24 ENCOUNTER — APPOINTMENT (OUTPATIENT)
Dept: NUCLEAR MEDICINE | Age: 60
DRG: 246 | End: 2020-05-24
Payer: MEDICARE

## 2020-05-24 ENCOUNTER — APPOINTMENT (OUTPATIENT)
Dept: GENERAL RADIOLOGY | Age: 60
DRG: 246 | End: 2020-05-24
Payer: MEDICARE

## 2020-05-24 ENCOUNTER — HOSPITAL ENCOUNTER (INPATIENT)
Age: 60
LOS: 4 days | Discharge: HOME | DRG: 246 | End: 2020-05-30
Attending: INTERNAL MEDICINE | Admitting: INTERNAL MEDICINE
Payer: MEDICARE

## 2020-05-24 PROBLEM — R07.9 CHEST PAIN: Status: ACTIVE | Noted: 2020-05-24

## 2020-05-24 LAB
ALBUMIN SERPL-MCNC: 3.2 G/DL (ref 3.5–4.6)
ALP BLD-CCNC: 104 U/L (ref 40–130)
ALT SERPL-CCNC: 42 U/L (ref 0–33)
ANION GAP SERPL CALCULATED.3IONS-SCNC: 16 MEQ/L (ref 9–15)
AST SERPL-CCNC: 39 U/L (ref 0–35)
BASE EXCESS ARTERIAL: -6 (ref -3–3)
BASOPHILS ABSOLUTE: 0.1 K/UL (ref 0–0.2)
BASOPHILS RELATIVE PERCENT: 0.8 %
BILIRUB SERPL-MCNC: 0.3 MG/DL (ref 0.2–0.7)
BUN BLDV-MCNC: 71 MG/DL (ref 8–23)
CALCIUM IONIZED: 1.21 MMOL/L (ref 1.12–1.32)
CALCIUM SERPL-MCNC: 9.4 MG/DL (ref 8.5–9.9)
CHLORIDE BLD-SCNC: 94 MEQ/L (ref 95–107)
CK MB: 5.3 NG/ML (ref 0–3.8)
CO2: 17 MEQ/L (ref 20–31)
CREAT SERPL-MCNC: 3.76 MG/DL (ref 0.5–0.9)
CREATINE KINASE-MB INDEX: 2.3 % (ref 0–3.5)
D DIMER: 2.25 MG/L FEU (ref 0–0.5)
EOSINOPHILS ABSOLUTE: 0.2 K/UL (ref 0–0.7)
EOSINOPHILS RELATIVE PERCENT: 2.2 %
GFR AFRICAN AMERICAN: 14.7
GFR AFRICAN AMERICAN: 17
GFR NON-AFRICAN AMERICAN: 12.2
GFR NON-AFRICAN AMERICAN: 14
GLOBULIN: 3.2 G/DL (ref 2.3–3.5)
GLUCOSE BLD-MCNC: 171 MG/DL (ref 70–99)
GLUCOSE BLD-MCNC: 198 MG/DL (ref 60–115)
GLUCOSE BLD-MCNC: 289 MG/DL (ref 60–115)
HBA1C MFR BLD: 7.1 % (ref 4.8–5.9)
HCO3 ARTERIAL: 19.1 MMOL/L (ref 21–29)
HCT VFR BLD CALC: 26.1 % (ref 37–47)
HEMOGLOBIN: 7.4 GM/DL (ref 12–16)
HEMOGLOBIN: 8.9 G/DL (ref 12–16)
LACTATE: 1.11 MMOL/L (ref 0.4–2)
LYMPHOCYTES ABSOLUTE: 1 K/UL (ref 1–4.8)
LYMPHOCYTES RELATIVE PERCENT: 13.9 %
MAGNESIUM: 2.4 MG/DL (ref 1.7–2.4)
MCH RBC QN AUTO: 28.5 PG (ref 27–31.3)
MCHC RBC AUTO-ENTMCNC: 34.2 % (ref 33–37)
MCV RBC AUTO: 83.4 FL (ref 82–100)
MONOCYTES ABSOLUTE: 0.6 K/UL (ref 0.2–0.8)
MONOCYTES RELATIVE PERCENT: 7.9 %
NEUTROPHILS ABSOLUTE: 5.2 K/UL (ref 1.4–6.5)
NEUTROPHILS RELATIVE PERCENT: 75.2 %
O2 SAT, ARTERIAL: 98 % (ref 93–100)
PCO2 ARTERIAL: 32 MM HG (ref 35–45)
PDW BLD-RTO: 14.3 % (ref 11.5–14.5)
PERFORMED ON: ABNORMAL
PERFORMED ON: ABNORMAL
PH ARTERIAL: 7.39 (ref 7.35–7.45)
PLATELET # BLD: 143 K/UL (ref 130–400)
PO2 ARTERIAL: 98 MM HG (ref 75–108)
POC CHLORIDE: 101 MEQ/L (ref 99–110)
POC CREATININE: 3.3 MG/DL (ref 0.6–1.2)
POC HEMATOCRIT: 22 % (ref 36–48)
POC POTASSIUM: 4.8 MEQ/L (ref 3.5–5.1)
POC SAMPLE TYPE: ABNORMAL
POC SODIUM: 129 MEQ/L (ref 136–145)
POTASSIUM SERPL-SCNC: 5.5 MEQ/L (ref 3.4–4.9)
PRO-BNP: 4303 PG/ML
RBC # BLD: 3.12 M/UL (ref 4.2–5.4)
SODIUM BLD-SCNC: 127 MEQ/L (ref 135–144)
TCO2 ARTERIAL: 20 (ref 22–29)
TOTAL CK: 231 U/L (ref 0–170)
TOTAL PROTEIN: 6.4 G/DL (ref 6.3–8)
TROPONIN: <0.01 NG/ML (ref 0–0.01)
WBC # BLD: 7 K/UL (ref 4.8–10.8)

## 2020-05-24 PROCEDURE — 71045 X-RAY EXAM CHEST 1 VIEW: CPT

## 2020-05-24 PROCEDURE — 83735 ASSAY OF MAGNESIUM: CPT

## 2020-05-24 PROCEDURE — 78582 LUNG VENTILAT&PERFUS IMAGING: CPT

## 2020-05-24 PROCEDURE — 6370000000 HC RX 637 (ALT 250 FOR IP): Performed by: PHYSICIAN ASSISTANT

## 2020-05-24 PROCEDURE — G0378 HOSPITAL OBSERVATION PER HR: HCPCS

## 2020-05-24 PROCEDURE — 36415 COLL VENOUS BLD VENIPUNCTURE: CPT

## 2020-05-24 PROCEDURE — 80053 COMPREHEN METABOLIC PANEL: CPT

## 2020-05-24 PROCEDURE — 85014 HEMATOCRIT: CPT

## 2020-05-24 PROCEDURE — 84484 ASSAY OF TROPONIN QUANT: CPT

## 2020-05-24 PROCEDURE — 82803 BLOOD GASES ANY COMBINATION: CPT

## 2020-05-24 PROCEDURE — 82330 ASSAY OF CALCIUM: CPT

## 2020-05-24 PROCEDURE — 96374 THER/PROPH/DIAG INJ IV PUSH: CPT

## 2020-05-24 PROCEDURE — 2580000003 HC RX 258: Performed by: FAMILY MEDICINE

## 2020-05-24 PROCEDURE — 36600 WITHDRAWAL OF ARTERIAL BLOOD: CPT

## 2020-05-24 PROCEDURE — 6370000000 HC RX 637 (ALT 250 FOR IP): Performed by: INTERNAL MEDICINE

## 2020-05-24 PROCEDURE — 84132 ASSAY OF SERUM POTASSIUM: CPT

## 2020-05-24 PROCEDURE — A9539 TC99M PENTETATE: HCPCS | Performed by: PHYSICIAN ASSISTANT

## 2020-05-24 PROCEDURE — 3430000000 HC RX DIAGNOSTIC RADIOPHARMACEUTICAL: Performed by: PHYSICIAN ASSISTANT

## 2020-05-24 PROCEDURE — 85025 COMPLETE CBC W/AUTO DIFF WBC: CPT

## 2020-05-24 PROCEDURE — 83605 ASSAY OF LACTIC ACID: CPT

## 2020-05-24 PROCEDURE — 84295 ASSAY OF SERUM SODIUM: CPT

## 2020-05-24 PROCEDURE — 82435 ASSAY OF BLOOD CHLORIDE: CPT

## 2020-05-24 PROCEDURE — 82565 ASSAY OF CREATININE: CPT

## 2020-05-24 PROCEDURE — A9540 TC99M MAA: HCPCS | Performed by: PHYSICIAN ASSISTANT

## 2020-05-24 PROCEDURE — 93005 ELECTROCARDIOGRAM TRACING: CPT | Performed by: INTERNAL MEDICINE

## 2020-05-24 PROCEDURE — 99285 EMERGENCY DEPT VISIT HI MDM: CPT

## 2020-05-24 PROCEDURE — 6370000000 HC RX 637 (ALT 250 FOR IP): Performed by: FAMILY MEDICINE

## 2020-05-24 PROCEDURE — 82553 CREATINE MB FRACTION: CPT

## 2020-05-24 PROCEDURE — 83036 HEMOGLOBIN GLYCOSYLATED A1C: CPT

## 2020-05-24 PROCEDURE — 82550 ASSAY OF CK (CPK): CPT

## 2020-05-24 PROCEDURE — 83880 ASSAY OF NATRIURETIC PEPTIDE: CPT

## 2020-05-24 PROCEDURE — 2580000003 HC RX 258: Performed by: PHYSICIAN ASSISTANT

## 2020-05-24 PROCEDURE — 85379 FIBRIN DEGRADATION QUANT: CPT

## 2020-05-24 PROCEDURE — 6360000002 HC RX W HCPCS: Performed by: PHYSICIAN ASSISTANT

## 2020-05-24 RX ORDER — CARVEDILOL 6.25 MG/1
6.25 TABLET ORAL 2 TIMES DAILY WITH MEALS
Status: DISCONTINUED | OUTPATIENT
Start: 2020-05-24 | End: 2020-05-29

## 2020-05-24 RX ORDER — ACETAMINOPHEN 325 MG/1
650 TABLET ORAL EVERY 6 HOURS PRN
Status: DISCONTINUED | OUTPATIENT
Start: 2020-05-24 | End: 2020-05-30 | Stop reason: HOSPADM

## 2020-05-24 RX ORDER — SODIUM CHLORIDE 0.9 % (FLUSH) 0.9 %
10 SYRINGE (ML) INJECTION PRN
Status: DISCONTINUED | OUTPATIENT
Start: 2020-05-24 | End: 2020-05-30 | Stop reason: HOSPADM

## 2020-05-24 RX ORDER — LANOLIN ALCOHOL/MO/W.PET/CERES
3 CREAM (GRAM) TOPICAL NIGHTLY PRN
Status: DISCONTINUED | OUTPATIENT
Start: 2020-05-24 | End: 2020-05-30 | Stop reason: HOSPADM

## 2020-05-24 RX ORDER — PROMETHAZINE HYDROCHLORIDE 12.5 MG/1
12.5 TABLET ORAL EVERY 6 HOURS PRN
Status: DISCONTINUED | OUTPATIENT
Start: 2020-05-24 | End: 2020-05-30 | Stop reason: HOSPADM

## 2020-05-24 RX ORDER — DEXTROSE MONOHYDRATE 50 MG/ML
100 INJECTION, SOLUTION INTRAVENOUS PRN
Status: DISCONTINUED | OUTPATIENT
Start: 2020-05-24 | End: 2020-05-30 | Stop reason: HOSPADM

## 2020-05-24 RX ORDER — ONDANSETRON 2 MG/ML
4 INJECTION INTRAMUSCULAR; INTRAVENOUS EVERY 6 HOURS PRN
Status: DISCONTINUED | OUTPATIENT
Start: 2020-05-24 | End: 2020-05-30 | Stop reason: HOSPADM

## 2020-05-24 RX ORDER — NITROFURANTOIN MACROCRYSTALS 50 MG/1
50 CAPSULE ORAL DAILY
Status: DISCONTINUED | OUTPATIENT
Start: 2020-05-24 | End: 2020-05-24

## 2020-05-24 RX ORDER — KIT FOR THE PREPARATION OF TECHNETIUM TC 99M PENTETATE 20 MG/1
1 INJECTION, POWDER, LYOPHILIZED, FOR SOLUTION INTRAVENOUS; RESPIRATORY (INHALATION)
Status: COMPLETED | OUTPATIENT
Start: 2020-05-24 | End: 2020-05-24

## 2020-05-24 RX ORDER — LANOLIN ALCOHOL/MO/W.PET/CERES
400 CREAM (GRAM) TOPICAL DAILY
Status: DISCONTINUED | OUTPATIENT
Start: 2020-05-24 | End: 2020-05-30 | Stop reason: HOSPADM

## 2020-05-24 RX ORDER — NITROFURANTOIN MACROCRYSTALS 50 MG/1
50 CAPSULE ORAL DAILY
Status: ON HOLD | COMMUNITY
End: 2020-06-12 | Stop reason: HOSPADM

## 2020-05-24 RX ORDER — ASPIRIN 81 MG/1
81 TABLET, CHEWABLE ORAL DAILY
Status: DISCONTINUED | OUTPATIENT
Start: 2020-05-25 | End: 2020-05-30 | Stop reason: HOSPADM

## 2020-05-24 RX ORDER — 0.9 % SODIUM CHLORIDE 0.9 %
500 INTRAVENOUS SOLUTION INTRAVENOUS ONCE
Status: COMPLETED | OUTPATIENT
Start: 2020-05-24 | End: 2020-05-24

## 2020-05-24 RX ORDER — SODIUM POLYSTYRENE SULFONATE 15 G/60ML
15 SUSPENSION ORAL; RECTAL ONCE
Status: COMPLETED | OUTPATIENT
Start: 2020-05-24 | End: 2020-05-24

## 2020-05-24 RX ORDER — CLOPIDOGREL BISULFATE 75 MG/1
75 TABLET ORAL DAILY
Status: DISCONTINUED | OUTPATIENT
Start: 2020-05-24 | End: 2020-05-30 | Stop reason: HOSPADM

## 2020-05-24 RX ORDER — ARIPIPRAZOLE 5 MG/1
5 TABLET ORAL DAILY
Status: DISCONTINUED | OUTPATIENT
Start: 2020-05-24 | End: 2020-05-30 | Stop reason: HOSPADM

## 2020-05-24 RX ORDER — NICOTINE POLACRILEX 4 MG
15 LOZENGE BUCCAL PRN
Status: DISCONTINUED | OUTPATIENT
Start: 2020-05-24 | End: 2020-05-30 | Stop reason: HOSPADM

## 2020-05-24 RX ORDER — SODIUM CHLORIDE 9 MG/ML
INJECTION, SOLUTION INTRAVENOUS CONTINUOUS
Status: DISCONTINUED | OUTPATIENT
Start: 2020-05-24 | End: 2020-05-26

## 2020-05-24 RX ORDER — ATORVASTATIN CALCIUM 20 MG/1
20 TABLET, FILM COATED ORAL NIGHTLY
Status: DISCONTINUED | OUTPATIENT
Start: 2020-05-24 | End: 2020-05-30

## 2020-05-24 RX ORDER — DEXTROSE MONOHYDRATE 25 G/50ML
12.5 INJECTION, SOLUTION INTRAVENOUS PRN
Status: DISCONTINUED | OUTPATIENT
Start: 2020-05-24 | End: 2020-05-30 | Stop reason: HOSPADM

## 2020-05-24 RX ORDER — MORPHINE SULFATE 2 MG/ML
4 INJECTION, SOLUTION INTRAMUSCULAR; INTRAVENOUS
Status: DISCONTINUED | OUTPATIENT
Start: 2020-05-24 | End: 2020-05-30 | Stop reason: HOSPADM

## 2020-05-24 RX ORDER — ACETAMINOPHEN 650 MG/1
650 SUPPOSITORY RECTAL EVERY 6 HOURS PRN
Status: DISCONTINUED | OUTPATIENT
Start: 2020-05-24 | End: 2020-05-30 | Stop reason: HOSPADM

## 2020-05-24 RX ORDER — SODIUM CHLORIDE 0.9 % (FLUSH) 0.9 %
10 SYRINGE (ML) INJECTION EVERY 12 HOURS SCHEDULED
Status: DISCONTINUED | OUTPATIENT
Start: 2020-05-24 | End: 2020-05-30 | Stop reason: HOSPADM

## 2020-05-24 RX ORDER — ONDANSETRON 2 MG/ML
4 INJECTION INTRAMUSCULAR; INTRAVENOUS EVERY 30 MIN PRN
Status: DISCONTINUED | OUTPATIENT
Start: 2020-05-24 | End: 2020-05-30 | Stop reason: HOSPADM

## 2020-05-24 RX ORDER — ISOSORBIDE MONONITRATE 60 MG/1
120 TABLET, EXTENDED RELEASE ORAL DAILY
Status: DISCONTINUED | OUTPATIENT
Start: 2020-05-24 | End: 2020-05-30 | Stop reason: HOSPADM

## 2020-05-24 RX ORDER — NITROGLYCERIN 0.4 MG/1
0.4 TABLET SUBLINGUAL EVERY 5 MIN PRN
Status: COMPLETED | OUTPATIENT
Start: 2020-05-24 | End: 2020-05-28

## 2020-05-24 RX ORDER — ASPIRIN 81 MG/1
324 TABLET, CHEWABLE ORAL ONCE
Status: COMPLETED | OUTPATIENT
Start: 2020-05-24 | End: 2020-05-24

## 2020-05-24 RX ORDER — NITROGLYCERIN 0.4 MG/1
0.4 TABLET SUBLINGUAL EVERY 5 MIN PRN
Status: DISCONTINUED | OUTPATIENT
Start: 2020-05-24 | End: 2020-05-24 | Stop reason: SDUPTHER

## 2020-05-24 RX ORDER — INSULIN GLARGINE 100 [IU]/ML
35 INJECTION, SOLUTION SUBCUTANEOUS NIGHTLY
Status: DISCONTINUED | OUTPATIENT
Start: 2020-05-24 | End: 2020-05-30 | Stop reason: HOSPADM

## 2020-05-24 RX ORDER — PREGABALIN 75 MG/1
75 CAPSULE ORAL DAILY
Status: DISCONTINUED | OUTPATIENT
Start: 2020-05-24 | End: 2020-05-30 | Stop reason: HOSPADM

## 2020-05-24 RX ADMIN — SERTRALINE HYDROCHLORIDE 50 MG: 50 TABLET ORAL at 17:14

## 2020-05-24 RX ADMIN — ASPIRIN 81 MG 324 MG: 81 TABLET ORAL at 10:43

## 2020-05-24 RX ADMIN — NITROGLYCERIN 0.4 MG: 0.4 TABLET, ORALLY DISINTEGRATING SUBLINGUAL at 11:04

## 2020-05-24 RX ADMIN — ATORVASTATIN CALCIUM 20 MG: 20 TABLET, FILM COATED ORAL at 21:37

## 2020-05-24 RX ADMIN — NITROGLYCERIN 0.4 MG: 0.4 TABLET, ORALLY DISINTEGRATING SUBLINGUAL at 10:43

## 2020-05-24 RX ADMIN — ISOSORBIDE MONONITRATE 120 MG: 60 TABLET, EXTENDED RELEASE ORAL at 17:14

## 2020-05-24 RX ADMIN — Medication 10 ML: at 12:10

## 2020-05-24 RX ADMIN — Medication 400 MG: at 17:14

## 2020-05-24 RX ADMIN — CARVEDILOL 6.25 MG: 6.25 TABLET, FILM COATED ORAL at 17:14

## 2020-05-24 RX ADMIN — KIT FOR THE PREPARATION OF TECHNETIUM TC 99M PENTETATE 1 MILLICURIE: 20 INJECTION, POWDER, LYOPHILIZED, FOR SOLUTION INTRAVENOUS; RESPIRATORY (INHALATION) at 11:40

## 2020-05-24 RX ADMIN — SODIUM CHLORIDE: 9 INJECTION, SOLUTION INTRAVENOUS at 18:56

## 2020-05-24 RX ADMIN — CLOPIDOGREL BISULFATE 75 MG: 75 TABLET ORAL at 17:14

## 2020-05-24 RX ADMIN — ARIPIPRAZOLE 5 MG: 5 TABLET ORAL at 17:14

## 2020-05-24 RX ADMIN — INSULIN GLARGINE 35 UNITS: 100 INJECTION, SOLUTION SUBCUTANEOUS at 21:39

## 2020-05-24 RX ADMIN — PREGABALIN 75 MG: 75 CAPSULE ORAL at 17:14

## 2020-05-24 RX ADMIN — Medication 10 ML: at 12:11

## 2020-05-24 RX ADMIN — SODIUM POLYSTYRENE SULFONATE 15 G: 15 SUSPENSION ORAL; RECTAL at 18:55

## 2020-05-24 RX ADMIN — SODIUM CHLORIDE 500 ML: 9 INJECTION, SOLUTION INTRAVENOUS at 10:43

## 2020-05-24 RX ADMIN — ONDANSETRON 4 MG: 2 INJECTION INTRAMUSCULAR; INTRAVENOUS at 11:04

## 2020-05-24 RX ADMIN — Medication 5.1 MILLICURIE: at 12:10

## 2020-05-24 ASSESSMENT — ENCOUNTER SYMPTOMS
SHORTNESS OF BREATH: 1
VOMITING: 0
NAUSEA: 0
EYE DISCHARGE: 0
BACK PAIN: 0
CHEST TIGHTNESS: 0
SINUS PAIN: 0
RHINORRHEA: 0
ABDOMINAL PAIN: 0
COUGH: 0
COLOR CHANGE: 0
EYE REDNESS: 0
DIARRHEA: 0

## 2020-05-24 ASSESSMENT — PAIN DESCRIPTION - ORIENTATION: ORIENTATION: RIGHT;LEFT

## 2020-05-24 ASSESSMENT — PAIN DESCRIPTION - DESCRIPTORS: DESCRIPTORS: ACHING

## 2020-05-24 ASSESSMENT — PAIN DESCRIPTION - FREQUENCY: FREQUENCY: INTERMITTENT

## 2020-05-24 ASSESSMENT — PAIN SCALES - GENERAL
PAINLEVEL_OUTOF10: 0
PAINLEVEL_OUTOF10: 0
PAINLEVEL_OUTOF10: 8

## 2020-05-24 ASSESSMENT — PAIN DESCRIPTION - LOCATION: LOCATION: ABDOMEN

## 2020-05-24 ASSESSMENT — PAIN DESCRIPTION - PAIN TYPE: TYPE: ACUTE PAIN

## 2020-05-24 ASSESSMENT — PAIN DESCRIPTION - PROGRESSION: CLINICAL_PROGRESSION: GRADUALLY WORSENING

## 2020-05-24 ASSESSMENT — PAIN DESCRIPTION - ONSET: ONSET: ON-GOING

## 2020-05-24 NOTE — ACP (ADVANCE CARE PLANNING)
Advance Care Planning   Advance Care Planning Clinical Specialist  Conversation Note      Date of ACP Conversation: 5/24/2020    Conversation Conducted with:   Patient with Loyd Duarte: Next of Kin by law (only applies in absence of above)     ACP Clinical Specialist: [address] [facility], [facility] makes decisions on behalf of the incapacitated patient: Decision Maker is asked to consider and make decisions based on patient values, known preferences, or best interests. Current Designated Health Care Decision Maker:   (as entered in [address] [place]  field. Validate  this information as still accurate & up-to-date; edit [address] field as needed.)    If no Decision Maker listed above or available through scanned documents, then:    [address]   Who do you trust to make healthcare decisions for you? Name:   Magalie Stone: daughter  Phone  Number: [phone]  Can this person be reached and be able to respond quickly, such as within a few minutes or hours? Yes    Who would be your back-up decision maker? Pt states only her daughter. For below questions, when conducting conversation with [address], substitute \"she\" and \"her\" for \"you\" and \"your\". Hospitalization: pt is being hospitalized. Ventilation  If you were in your present state of health and suddenly became very ill and were unable to breathe on your own, what would your preference be about the use of a ventilator (breathing machine) if it were available to you? If patient would desire the use of a ventilator (breathing machine), answer \"yes\", if not \"no\":yes    If your health were to worsen and it became clear that your chance of recovery was unlikely, would that change your answer?  No    Resuscitation  CPR works best to restart the heart when there is a sudden event, like a heart attack, in someone who is

## 2020-05-24 NOTE — CONSULTS
Continuous Blood Gluc  (DEXCOM G6 ) CORETTA 1 Device by Does not apply route continuous 3/31/20  Yes Sophie Mcgregor MD   Continuous Blood Gluc Sensor (DEXCOM G6 SENSOR) MISC 1 Device by Does not apply route continuous 3/31/20  Yes Sophie Mcgregor MD   Continuous Blood Gluc Transmit (DEXCOM G6 TRANSMITTER) MISC 1 Device by Does not apply route continuous 3/31/20  Yes Sophie Mcgregor MD   melatonin 3 MG TABS tablet TAKE 1 TABLET BY MOUTH EVERY NIGHT AS NEEDED FOR INSOMNIA 3/17/20  Yes Vero Shankar MD   nitroGLYCERIN (NITROSTAT) 0.4 MG SL tablet Place 1 tablet under the tongue every 5 minutes as needed for Chest pain 9/22/15  Yes Historical Provider, MD   carvedilol (COREG) 6.25 MG tablet Take 6.25 mg by mouth 2 times daily (with meals)   Yes Historical Provider, MD   clopidogrel (PLAVIX) 75 MG tablet Take 75 mg by mouth daily   Yes Historical Provider, MD   isosorbide mononitrate (IMDUR) 60 MG extended release tablet Take 120 mg by mouth daily   Yes Historical Provider, MD   magnesium oxide (MAG-OX) 400 MG tablet Take 400 mg by mouth daily   Yes Historical Provider, MD   vitamin B-12 (CYANOCOBALAMIN) 100 MCG tablet TAKE 1 TABLET BY MOUTH DAILY 19 Yes Vero Shankar MD   D3-1000 1000 units CAPS TAKE 1 CAPSULE BY MOUTH ONCE DAILY 10/22/19  Yes Eze Greenwood MD   pregabalin (LYRICA) 75 MG capsule Give one capsule po bid 10/21/19 1/29/20  LORETO Florez - CNP       Allergies:  Codeine    REVIEW OF SYSTEMS:  Negative except for what is in HPI and 10 pt systems reviewed and negative. PHYSICAL EXAM:  Vitals:  BP (!) 153/74   Pulse 62   Temp 97.3 °F (36.3 °C) (Oral)   Resp 20   Ht 5' 7\" (1.702 m)   Wt 245 lb 13 oz (111.5 kg)   LMP  (LMP Unknown)   SpO2 98%   BMI 38.50 kg/m²   Pulse Ox:  SpO2  Av %  Min: 98 %  Max: 100 %  Supplemental O2:      CONSTITUTIONAL:  awake, alert, cooperative, no apparent distress, and appears stated age  [de-identified]: Normocephalic, PERRLA  NECK: no JVD, no LAD  HEART: RRR, no murmurs, gallops, or rubs  LUNGS: clear to auscultation bilaterally, no wheezes, crackles, or rhonchi.   ABDOMEN: soft/NT/ND, positive BS  MUSCULOSKELETAL: negative for edema, +2 pulses  SKIN: intact without rash or jaundice  NEURO:  CN II-XII intact and no focal deficits    DATA:  Recent Results (from the past 24 hour(s))   CBC Auto Differential    Collection Time: 05/24/20 10:30 AM   Result Value Ref Range    WBC 7.0 4.8 - 10.8 K/uL    RBC 3.12 (L) 4.20 - 5.40 M/uL    Hemoglobin 8.9 (L) 12.0 - 16.0 g/dL    Hematocrit 26.1 (L) 37.0 - 47.0 %    MCV 83.4 82.0 - 100.0 fL    MCH 28.5 27.0 - 31.3 pg    MCHC 34.2 33.0 - 37.0 %    RDW 14.3 11.5 - 14.5 %    Platelets 008 134 - 788 K/uL    Neutrophils % 75.2 %    Lymphocytes % 13.9 %    Monocytes % 7.9 %    Eosinophils % 2.2 %    Basophils % 0.8 %    Neutrophils Absolute 5.2 1.4 - 6.5 K/uL    Lymphocytes Absolute 1.0 1.0 - 4.8 K/uL    Monocytes Absolute 0.6 0.2 - 0.8 K/uL    Eosinophils Absolute 0.2 0.0 - 0.7 K/uL    Basophils Absolute 0.1 0.0 - 0.2 K/uL   Comprehensive Metabolic Panel    Collection Time: 05/24/20 10:30 AM   Result Value Ref Range    Sodium 127 (L) 135 - 144 mEq/L    Potassium 5.5 (H) 3.4 - 4.9 mEq/L    Chloride 94 (L) 95 - 107 mEq/L    CO2 17 (L) 20 - 31 mEq/L    Anion Gap 16 (H) 9 - 15 mEq/L    Glucose 171 (H) 70 - 99 mg/dL    BUN 71 (H) 8 - 23 mg/dL    CREATININE 3.76 (H) 0.50 - 0.90 mg/dL    GFR Non-African American 12.2 (L) >60    GFR  14.7 (L) >60    Calcium 9.4 8.5 - 9.9 mg/dL    Total Protein 6.4 6.3 - 8.0 g/dL    Alb 3.2 (L) 3.5 - 4.6 g/dL    Total Bilirubin 0.3 0.2 - 0.7 mg/dL    Alkaline Phosphatase 104 40 - 130 U/L    ALT 42 (H) 0 - 33 U/L    AST 39 (H) 0 - 35 U/L    Globulin 3.2 2.3 - 3.5 g/dL   Magnesium    Collection Time: 05/24/20 10:30 AM   Result Value Ref Range    Magnesium 2.4 1.7 - 2.4 mg/dL   Troponin    Collection Time: 05/24/20 10:30 AM   Result Value Ref Range    Troponin <0.010 0.000 - 0.010 ng/mL   D-Dimer, Quantitative    Collection Time: 05/24/20 10:30 AM   Result Value Ref Range    D-Dimer, Quant 2.25 (HH) 0.00 - 0.50 mg/L FEU   CK    Collection Time: 05/24/20 10:30 AM   Result Value Ref Range    Total  (H) 0 - 170 U/L   CKMB & RELATIVE PERCENT    Collection Time: 05/24/20 10:30 AM   Result Value Ref Range    CK-MB 5.3 (H) 0.0 - 3.8 ng/mL    CK-MB Index 2.3 0.0 - 3.5 %   POCT Arterial    Collection Time: 05/24/20 11:32 AM   Result Value Ref Range    POC Sodium 129 (L) 136 - 145 mEq/L    POC Potassium 4.8 3.5 - 5.1 mEq/L    POC Chloride 101 99 - 110 mEq/L    POC Glucose 198 (H) 60 - 115 mg/dl    POC Creatinine 3.3 (H) 0.6 - 1.2 mg/dL    GFR Non-African American 14 (A) >60    GFR  17 (A) >60    Calcium, Ion 1.21 1.12 - 1.32 mmol/L    pH, Arterial 7.389 7.350 - 7.450    pCO2, Arterial 32 (L) 35 - 45 mm Hg    pO2, Arterial 98 (HH) 75 - 108 mm Hg    HCO3, Arterial 19.1 (L) 21.0 - 29.0 mmol/L    Base Excess, Arterial -6 (L) -3 - 3    O2 Sat, Arterial 98 (HH) 93 - 100 %    TCO2, Arterial 20 (L) 22 - 29    Lactate 1.11 0.40 - 2.00 mmol/L    POC Hematocrit 22 (L) 36 - 48 %    Hemoglobin 7.4 (L) 12.0 - 16.0 gm/dL    Sample Type ART     Performed on SEE BELOW    Troponin    Collection Time: 05/24/20  1:53 PM   Result Value Ref Range    Troponin <0.010 0.000 - 0.010 ng/mL   Brain Natriuretic Peptide    Collection Time: 05/24/20  1:53 PM   Result Value Ref Range    Pro-BNP 4,303 pg/mL   Hemoglobin A1c    Collection Time: 05/24/20  5:47 PM   Result Value Ref Range    Hemoglobin A1C 7.1 (H) 4.8 - 5.9 %         ASSESSMENT AND PLAN:  1)  Unstable angina  Patient is placed in observation for cycling of troponins, repeat ekg, further cardiac orders per attending physician. 2)  DM 2  Sliding scale insulin, hemoglobin A1c, continue home long-acting insulin.     3)  LUCÍA on CKD stage 4  Gentle IV hydration    4)  Hyponatremia, hyperkalemia  NS iv fluids,

## 2020-05-24 NOTE — ED PROVIDER NOTES
tablet, R-4Normal      nitroGLYCERIN (NITROSTAT) 0.4 MG SL tablet Place 1 tablet under the tongue every 5 minutes as needed for Chest painHistorical Med      clopidogrel (PLAVIX) 75 MG tablet Take 75 mg by mouth dailyHistorical Med      magnesium oxide (MAG-OX) 400 MG tablet Take 400 mg by mouth dailyHistorical Med      vitamin B-12 (CYANOCOBALAMIN) 100 MCG tablet TAKE 1 TABLET BY MOUTH DAILY, Disp-30 tablet, R-11Normal      D3-1000 1000 units CAPS TAKE 1 CAPSULE BY MOUTH ONCE DAILY, Disp-90 capsule, R-2Normal       !! - Potential duplicate medications found. Please discuss with provider.           ALLERGIES     Codeine    FAMILY HISTORY       Family History   Problem Relation Age of Onset   [facility] Cancer Mother 76        survived   [facility] Hypertension Father     Diabetes Sister     Mental Illness Sister           SOCIAL HISTORY       Social History     Socioeconomic History    Marital status:      Spouse name: None    Number of children: 2    Years of education: None    Highest education level: None   Occupational History    Occupation: disabled   Social Needs    Financial resource strain: None    Food insecurity     Worry: None     Inability: None    Transportation needs     Medical: None     Non-medical: None   Tobacco Use    Smoking status: Passive Smoke Exposure - Never Smoker    Smokeless tobacco: Never Used   Substance and Sexual Activity    Alcohol use: No     Alcohol/week: 0.0 standard drinks    Drug use: No    Sexual activity: Not Currently   Lifestyle    Physical activity     Days per week: None     Minutes per session: None    Stress: None   Relationships    Social connections     Talks on phone: None     Gets together: None     Attends Yarsani service: None     Active member of club or organization: None     Attends meetings of clubs or organizations: None     Relationship status: None    Intimate partner violence     Fear of current or ex partner: None     Emotionally abused: None Physically abused: None     Forced sexual activity: None   Other Topics Concern    None   Social History Narrative    Born in [place], one of 5    Twin sister Reyes, very ill in 2018, [place] [address]    Moved to [facility], , 2 children, one son and one daughter    Worked at [employer], as a nurse's aide    Disabled due to mental illness    Lived at [facility], was discharged, returned to independent living in 2017 in the daughter's house and has adjusted well    One son and one daughter, live in the same house with patient, Hugo Jurbijan pays the rent    [employer] reading ([employer])       SCREENINGS    [place] Coma Scale  Eye Opening: Spontaneous  Best Verbal Response: Oriented  Best Motor Response: Obeys commands  [place] Coma Scale Score: 15         PHYSICAL EXAM    (up to 7 for level 4, 8 or more for level 5)     ED Triage Vitals [05/24/20 1019]   BP Temp Temp Source Pulse Resp SpO2 Height Weight   116/61 97.8 °F (36.6 °C) Oral 65 20 100 % 5' 7\" (1.702 m) 230 lb (104.3 kg)       Physical Exam  Vitals signs and nursing note reviewed. Constitutional:       General: She is not in acute distress. Appearance: Normal appearance. She is normal weight. HENT:      Head: Normocephalic. Right Ear: Tympanic membrane, ear canal and external ear normal.      Left Ear: Tympanic membrane, ear canal and external ear normal.      Nose: Nose normal.      Mouth/Throat:      Mouth: Mucous membranes are moist.      Pharynx: Oropharynx is clear. No oropharyngeal exudate or posterior oropharyngeal erythema. Eyes:      Conjunctiva/sclera: Conjunctivae normal.      Pupils: Pupils are equal, round, and reactive to light. Neck:      Musculoskeletal: Normal range of motion. No neck rigidity. Cardiovascular:      Rate and Rhythm: Normal rate and regular rhythm. Pulses: Normal pulses. Heart sounds: Normal heart sounds. No murmur. No friction rub.    Pulmonary:      Effort: Pulmonary effort is normal. POCT GLUCOSE   POCT GLUCOSE   POCT GLUCOSE   POCT GLUCOSE   POCT GLUCOSE   POCT GLUCOSE   POCT GLUCOSE   POCT GLUCOSE   POCT GLUCOSE   POCT GLUCOSE   POCT GLUCOSE       All other labs were within normal range or not returned as of this dictation. EMERGENCY DEPARTMENT COURSE and DIFFERENTIAL DIAGNOSIS/MDM:   Vitals:    Vitals:    05/29/20 1722 05/29/20 1929 05/30/20 0748 05/30/20 0800   BP: (!) 157/72 (!) 146/59 (!) 164/74    Pulse:  66 60 60   Resp:   17    Temp:  97.3 °F (36.3 °C) 97.9 °F (36.6 °C)    TempSrc:   Oral    SpO2:  98% 99%    Weight:       Height:           57-year of age female who presents the ED with chest pain that started last night is progressively gotten worse. Patient given nitro x1 with some relief, given a second with, more relief. EKG, CBC, CMP, troponin, d-dimer, CK obtained. Along with chest x-ray. Patient noted to have elevated d-dimer so a VQ scan was ordered secondary to patient's history of CKD and a creatinine of over 3. Patient also noted to have elevated CK. I believe patient symptoms are cardiac in nature. Dr. Diony Schwartz was consulted and agreed to admit the patient with request of also consulting Dr. Dorian Guerra secondary to Pt's worsening creatinine. .  Dr. Yoana Moran was on for Dr. Dorian Guerra service and agreed to see the patient tomorrow. Patient was advised, and agreeable to admission. MDM        REASSESSMENT              CONSULTS:  IP CONSULT TO HOSPITALIST  IP CONSULT TO HOSPITALIST    PROCEDURES:  Unless otherwise noted below, none     Procedures    FINAL IMPRESSION      1.  Chest pain, unspecified type          DISPOSITION/PLAN   DISPOSITION        PATIENT REFERREDTO:  Neil Farooq MD  [facility], [address] [place] [id number] [phone]    Schedule an appointment as soon as possible for a visit in 2 weeks        DISCHARGEMEDICATIONS:  Discharge Medication List as of 5/30/2020 10:49 AM      START taking these medications    Details   albuterol (PROVENTIL) (2.5 MG/3ML) 0.083% nebulizer solution Take 3 mLs by nebulization every 6 hours as needed for Wheezing, Disp-120 each, R-3Normal      furosemide (LASIX) 40 MG tablet Take 1 tablet by mouth daily, Disp-60 tablet, R-3Normal           Controlled Substances Monitoring:     RX Monitoring 11/18/2019   Attestation -   Acute Pain Prescriptions Prescription exceeds daily limit for a specific reason. See comments or note. Periodic Controlled Substance Monitoring No signs of potential drug abuse or diversion identified.        (Please note that portions of this note were completed with a voice recognition program.  Efforts were made to edit the dictations but occasionally words are mis-transcribed.)    Viral Dodge PA-C (electronically signed)           Viral Dodge PA-C  05/24/20 [address] [place] [address]  06/05/20 4415

## 2020-05-24 NOTE — ED TRIAGE NOTES
[facility] with complaint of sob, chest pain, dizziness, and right arm pain. msp intact. Patient A&OX3. Skin pink, warm, and dry. Patient states \"every time I feel like this I need a stent in my heart\". No distress noted. Neuro wnl.

## 2020-05-25 ENCOUNTER — APPOINTMENT (OUTPATIENT)
Dept: ULTRASOUND IMAGING | Age: 60
DRG: 246 | End: 2020-05-25
Payer: MEDICARE

## 2020-05-25 LAB
ANION GAP SERPL CALCULATED.3IONS-SCNC: 15 MEQ/L (ref 9–15)
BACTERIA: NEGATIVE /HPF
BASOPHILS ABSOLUTE: 0 K/UL (ref 0–0.2)
BASOPHILS RELATIVE PERCENT: 0.6 %
BILIRUBIN URINE: NEGATIVE
BLOOD, URINE: NEGATIVE
BUN BLDV-MCNC: 65 MG/DL (ref 8–23)
CALCIUM SERPL-MCNC: 9.1 MG/DL (ref 8.5–9.9)
CHLORIDE BLD-SCNC: 102 MEQ/L (ref 95–107)
CHOLESTEROL, TOTAL: 107 MG/DL (ref 0–199)
CLARITY: CLEAR
CO2: 18 MEQ/L (ref 20–31)
COLOR: YELLOW
CREAT SERPL-MCNC: 3.47 MG/DL (ref 0.5–0.9)
CREATININE URINE: 53.4 MG/DL
EOSINOPHILS ABSOLUTE: 0.2 K/UL (ref 0–0.7)
EOSINOPHILS RELATIVE PERCENT: 3.3 %
EPITHELIAL CELLS, UA: NORMAL /HPF (ref 0–5)
GFR AFRICAN AMERICAN: 16.2
GFR NON-AFRICAN AMERICAN: 13.4
GLUCOSE BLD-MCNC: 102 MG/DL (ref 70–99)
GLUCOSE BLD-MCNC: 111 MG/DL (ref 60–115)
GLUCOSE BLD-MCNC: 119 MG/DL (ref 60–115)
GLUCOSE BLD-MCNC: 148 MG/DL (ref 60–115)
GLUCOSE BLD-MCNC: 251 MG/DL (ref 60–115)
GLUCOSE URINE: NEGATIVE MG/DL
HCT VFR BLD CALC: 25.4 % (ref 37–47)
HCT VFR BLD CALC: 25.7 % (ref 37–47)
HDLC SERPL-MCNC: 44 MG/DL (ref 40–59)
HEMOGLOBIN: 8.5 G/DL (ref 12–16)
HEMOGLOBIN: 8.6 G/DL (ref 12–16)
HYALINE CASTS: NORMAL /HPF (ref 0–5)
KETONES, URINE: NEGATIVE MG/DL
LDL CHOLESTEROL CALCULATED: 46 MG/DL (ref 0–129)
LEUKOCYTE ESTERASE, URINE: NEGATIVE
LYMPHOCYTES ABSOLUTE: 1.2 K/UL (ref 1–4.8)
LYMPHOCYTES RELATIVE PERCENT: 19.9 %
MAGNESIUM: 2.5 MG/DL (ref 1.7–2.4)
MCH RBC QN AUTO: 28.6 PG (ref 27–31.3)
MCH RBC QN AUTO: 28.6 PG (ref 27–31.3)
MCHC RBC AUTO-ENTMCNC: 33.5 % (ref 33–37)
MCHC RBC AUTO-ENTMCNC: 33.5 % (ref 33–37)
MCV RBC AUTO: 85.2 FL (ref 82–100)
MCV RBC AUTO: 85.3 FL (ref 82–100)
MONOCYTES ABSOLUTE: 0.7 K/UL (ref 0.2–0.8)
MONOCYTES RELATIVE PERCENT: 12.6 %
NEUTROPHILS ABSOLUTE: 3.7 K/UL (ref 1.4–6.5)
NEUTROPHILS RELATIVE PERCENT: 63.6 %
NITRITE, URINE: NEGATIVE
PDW BLD-RTO: 14.2 % (ref 11.5–14.5)
PDW BLD-RTO: 14.6 % (ref 11.5–14.5)
PERFORMED ON: ABNORMAL
PERFORMED ON: NORMAL
PH UA: 5.5 (ref 5–9)
PLATELET # BLD: 153 K/UL (ref 130–400)
PLATELET # BLD: 156 K/UL (ref 130–400)
POTASSIUM SERPL-SCNC: 4.7 MEQ/L (ref 3.4–4.9)
PROTEIN UA: >=300 MG/DL
RBC # BLD: 2.98 M/UL (ref 4.2–5.4)
RBC # BLD: 3.02 M/UL (ref 4.2–5.4)
RBC UA: NORMAL /HPF (ref 0–5)
SODIUM BLD-SCNC: 135 MEQ/L (ref 135–144)
SPECIFIC GRAVITY UA: 1.01 (ref 1–1.03)
TRIGL SERPL-MCNC: 85 MG/DL (ref 0–150)
UREA NITROGEN, UR: 480 MG/DL
UROBILINOGEN, URINE: 1 E.U./DL
WBC # BLD: 5.8 K/UL (ref 4.8–10.8)
WBC # BLD: 5.9 K/UL (ref 4.8–10.8)
WBC UA: NORMAL /HPF (ref 0–5)

## 2020-05-25 PROCEDURE — 82570 ASSAY OF URINE CREATININE: CPT

## 2020-05-25 PROCEDURE — 80061 LIPID PANEL: CPT

## 2020-05-25 PROCEDURE — 85027 COMPLETE CBC AUTOMATED: CPT

## 2020-05-25 PROCEDURE — 36415 COLL VENOUS BLD VENIPUNCTURE: CPT

## 2020-05-25 PROCEDURE — 85025 COMPLETE CBC W/AUTO DIFF WBC: CPT

## 2020-05-25 PROCEDURE — 76775 US EXAM ABDO BACK WALL LIM: CPT

## 2020-05-25 PROCEDURE — 2580000003 HC RX 258: Performed by: INTERNAL MEDICINE

## 2020-05-25 PROCEDURE — 6370000000 HC RX 637 (ALT 250 FOR IP): Performed by: INTERNAL MEDICINE

## 2020-05-25 PROCEDURE — G0378 HOSPITAL OBSERVATION PER HR: HCPCS

## 2020-05-25 PROCEDURE — 6360000002 HC RX W HCPCS: Performed by: INTERNAL MEDICINE

## 2020-05-25 PROCEDURE — 81001 URINALYSIS AUTO W/SCOPE: CPT

## 2020-05-25 PROCEDURE — 83735 ASSAY OF MAGNESIUM: CPT

## 2020-05-25 PROCEDURE — 96372 THER/PROPH/DIAG INJ SC/IM: CPT

## 2020-05-25 PROCEDURE — 80048 BASIC METABOLIC PNL TOTAL CA: CPT

## 2020-05-25 PROCEDURE — 99220 PR INITIAL OBSERVATION CARE/DAY 70 MINUTES: CPT | Performed by: INTERNAL MEDICINE

## 2020-05-25 PROCEDURE — 6370000000 HC RX 637 (ALT 250 FOR IP): Performed by: FAMILY MEDICINE

## 2020-05-25 PROCEDURE — 93005 ELECTROCARDIOGRAM TRACING: CPT | Performed by: INTERNAL MEDICINE

## 2020-05-25 PROCEDURE — 84540 ASSAY OF URINE/UREA-N: CPT

## 2020-05-25 RX ORDER — 0.9 % SODIUM CHLORIDE 0.9 %
500 INTRAVENOUS SOLUTION INTRAVENOUS ONCE
Status: COMPLETED | OUTPATIENT
Start: 2020-05-25 | End: 2020-05-25

## 2020-05-25 RX ORDER — 0.9 % SODIUM CHLORIDE 0.9 %
500 INTRAVENOUS SOLUTION INTRAVENOUS ONCE
Status: DISCONTINUED | OUTPATIENT
Start: 2020-05-25 | End: 2020-05-30 | Stop reason: HOSPADM

## 2020-05-25 RX ADMIN — ATORVASTATIN CALCIUM 20 MG: 20 TABLET, FILM COATED ORAL at 20:35

## 2020-05-25 RX ADMIN — PREGABALIN 75 MG: 75 CAPSULE ORAL at 09:18

## 2020-05-25 RX ADMIN — DARBEPOETIN ALFA 40 MCG: 40 SOLUTION INTRAVENOUS; SUBCUTANEOUS at 17:39

## 2020-05-25 RX ADMIN — CARVEDILOL 6.25 MG: 6.25 TABLET, FILM COATED ORAL at 17:39

## 2020-05-25 RX ADMIN — SODIUM CHLORIDE 500 ML: 9 INJECTION, SOLUTION INTRAVENOUS at 14:36

## 2020-05-25 RX ADMIN — MELATONIN 3 MG: at 20:35

## 2020-05-25 RX ADMIN — SERTRALINE HYDROCHLORIDE 50 MG: 50 TABLET ORAL at 09:18

## 2020-05-25 RX ADMIN — CLOPIDOGREL BISULFATE 75 MG: 75 TABLET ORAL at 09:18

## 2020-05-25 RX ADMIN — CARVEDILOL 6.25 MG: 6.25 TABLET, FILM COATED ORAL at 09:22

## 2020-05-25 RX ADMIN — Medication 400 MG: at 09:18

## 2020-05-25 RX ADMIN — ISOSORBIDE MONONITRATE 120 MG: 60 TABLET, EXTENDED RELEASE ORAL at 09:18

## 2020-05-25 RX ADMIN — ASPIRIN 81 MG 81 MG: 81 TABLET ORAL at 09:18

## 2020-05-25 RX ADMIN — ENOXAPARIN SODIUM 30 MG: 30 INJECTION SUBCUTANEOUS at 14:41

## 2020-05-25 RX ADMIN — ARIPIPRAZOLE 5 MG: 5 TABLET ORAL at 09:18

## 2020-05-25 RX ADMIN — INSULIN GLARGINE 35 UNITS: 100 INJECTION, SOLUTION SUBCUTANEOUS at 22:23

## 2020-05-25 RX ADMIN — Medication 10 ML: at 20:35

## 2020-05-25 ASSESSMENT — ENCOUNTER SYMPTOMS
COLOR CHANGE: 0
BACK PAIN: 0
ABDOMINAL DISTENTION: 0
ABDOMINAL PAIN: 0
SHORTNESS OF BREATH: 1
APNEA: 0
CHOKING: 0
EYE DISCHARGE: 0
CHEST TIGHTNESS: 1

## 2020-05-25 ASSESSMENT — PAIN SCALES - GENERAL
PAINLEVEL_OUTOF10: 0
PAINLEVEL_OUTOF10: 0

## 2020-05-25 NOTE — H&P
History and Physical  Patient: Shweta Banks  Unit/Bed: O270/W465-83  YOB: 1960  MRN: [id number]  Acct: [de-identified]   Admitting Diagnosis: Chest pain [R07.9]  Chest pain [R07.9]  Admit Date:  5/24/2020  Hospital Day: 0      Chief Complaint: chest pain, dyspnea    History of Present Illness: 56year old female with recurrent stenosis of the mid LAD. Also with history of diastolic CHF. CKD with creatinine baseline mid 2 range. Presents now with atypical chest pain, shortness of breath. Found to have many electrolyte abnormalities including hyponatremia. Patient states the shortness of breath feels exactly like prior heart problems, and she has always had restenosis when she feels this way. PMHx:  Past Medical History:   Diagnosis Date    Anxiety     CAD S/P percutaneous coronary angioplasty 2015, 2018    stent per dr Darrow Litten CHF (congestive heart failure) ([address]     Diabetic nephropathy with proteinuria ([address] 2014    DJD (degenerative joint disease) of knee     Dr Blancas March GERD (gastroesophageal reflux disease)     Hemiparesis, left ([address] 2013    [facility]    History of seizures     History of type C viral hepatitis     HTN (hypertension)     Hyperlipidemia     Impaired mobility and activities of daily living     [facility]    Metabolic syndrome     Neurogenic urinary incontinence 2013    Neuropathy in diabetes ([address]     Obesity (BMI 30-39. 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)     Type 2 diabetes mellitus with renal manifestations, controlled ([address] 2015    Insulin dependent, Dr Star Duron 0.9 % injection 10 mL  10 mL Intravenous PRN Facundo Glover MD   10 mL at 05/24/20 1211    sodium chloride flush 0.9 % injection 10 mL  10 mL Intravenous 2 times per day Facundo Glover MD        sodium chloride flush 0.9 % injection 10 mL  10 mL Intravenous PRN Facundo Glover MD        acetaminophen (TYLENOL) tablet 650 mg  650 mg Oral Q6H PRN Facundo Glover MD        Or   Milinda Broom acetaminophen (TYLENOL) suppository 650 mg  650 mg Rectal Q6H PRN Facundo Glover MD        magnesium hydroxide (MILK OF MAGNESIA) 400 MG/5ML suspension 30 mL  30 mL Oral Daily PRN Facundo Glover MD        promethazine (PHENERGAN) tablet 12.5 mg  12.5 mg Oral Q6H PRN Facundo Glover MD        Or    ondansetron (ZOFRAN) injection 4 mg  4 mg Intravenous Q6H PRN Facundo Glover MD        atorvastatin (LIPITOR) tablet 20 mg  20 mg Oral Nightly Facundo Glover MD   20 mg at 05/24/20 2137    aspirin chewable tablet 81 mg  81 mg Oral Daily Facundo Glover MD   81 mg at 05/25/20 8159    pregabalin (LYRICA) capsule 75 mg  75 mg Oral Daily Uziel Allen MD   75 mg at 05/25/20 0721    carvedilol (COREG) tablet 6.25 mg  6.25 mg Oral BID WC Uziel Allen MD   6.25 mg at 05/25/20 3509    clopidogrel (PLAVIX) tablet 75 mg  75 mg Oral Daily Uziel Allen MD   75 mg at 05/25/20 7426    isosorbide mononitrate (IMDUR) extended release tablet 120 mg  120 mg Oral Daily Uziel Allen MD   120 mg at 05/25/20 7571    magnesium oxide (MAG-OX) tablet 400 mg  400 mg Oral Daily Uziel Allen MD   400 mg at 05/25/20 9506    melatonin tablet 3 mg  3 mg Oral Nightly PRN Uziel Allen MD        sertraline (ZOLOFT) tablet 50 mg  50 mg Oral Daily Uziel Allen MD   50 mg at 05/25/20 0684    ARIPiprazole (ABILIFY) tablet 5 mg  5 mg Oral Daily Uziel Allen MD   5 mg at 05/25/20 0918    glucose (GLUTOSE) 40 % oral gel 15 g  15 g Oral DEEPIKAN Uziel Allen MD Component Value Date    WBC 5.9 05/25/2020    RBC 3.02 05/25/2020    HGB 8.6 05/25/2020    HCT 25.7 05/25/2020    MCV 85.2 05/25/2020    MCH 28.6 05/25/2020    MCHC 33.5 05/25/2020    RDW 14.6 05/25/2020     05/25/2020    MPV 10.0 03/05/2020     CBC with Differential:    Lab Results   Component Value Date    WBC 5.9 05/25/2020    RBC 3.02 05/25/2020    HGB 8.6 05/25/2020    HCT 25.7 05/25/2020     05/25/2020    MCV 85.2 05/25/2020    MCH 28.6 05/25/2020    MCHC 33.5 05/25/2020    RDW 14.6 05/25/2020    BANDSPCT 5 06/14/2013    LYMPHOPCT 13.9 05/24/2020    MONOPCT 7.9 05/24/2020    EOSPCT 3.5 03/05/2020    BASOPCT 0.8 05/24/2020    MONOSABS 0.6 05/24/2020    LYMPHSABS 1.0 05/24/2020    EOSABS 0.2 05/24/2020    BASOSABS 0.1 05/24/2020     CMP:    Lab Results   Component Value Date     05/24/2020    K 5.5 05/24/2020    K 3.8 10/05/2019    CL 94 05/24/2020    CO2 17 05/24/2020    BUN 71 05/24/2020    CREATININE 3.3 05/24/2020    CREATININE 3.76 05/24/2020    GFRAA 17 05/24/2020    LABGLOM 14 05/24/2020    GLUCOSE 171 05/24/2020    PROT 6.4 05/24/2020    LABALBU 3.2 05/24/2020    CALCIUM 9.4 05/24/2020    BILITOT 0.3 05/24/2020    ALKPHOS 104 05/24/2020    AST 39 05/24/2020    ALT 42 05/24/2020     BMP:    Lab Results   Component Value Date     05/24/2020    K 5.5 05/24/2020    K 3.8 10/05/2019    CL 94 05/24/2020    CO2 17 05/24/2020    BUN 71 05/24/2020    LABALBU 3.2 05/24/2020    CREATININE 3.3 05/24/2020    CREATININE 3.76 05/24/2020    CALCIUM 9.4 05/24/2020    GFRAA 17 05/24/2020    LABGLOM 14 05/24/2020    GLUCOSE 171 05/24/2020     Magnesium:    Lab Results   Component Value Date    MG 2.5 05/25/2020     Troponin:    Lab Results   Component Value Date    TROPONINI <0.010 05/24/2020       EKG: EKG: normal EKG, normal sinus rhythm. Assessment/Plan:    Active Hospital Problems    Diagnosis Date Noted    Chest pain [R07.9] 05/24/2020           Appreciate renal and medical consults.

## 2020-05-25 NOTE — CONSULTS
COLONOSCOPY  1/9/2014    Dr. Emma Holguin      x1 Dr. Estrella Gomes, Dr Mary Kay Tuttle CATH LAB PROCEDURE  10/02/2019    HYSTERECTOMY, TOTAL ABDOMINAL      one ovary intact, Dr Aleena Valiente, menorrhagia    CT TOTAL KNEE ARTHROPLASTY Left 6/21/2018    LEFT KNEE TOTAL KNEE ARTHROPLASTY, SHAYNA, NERVE BLOCK performed by Slava Thomas MD at [address] [place] [address] Right     TOTAL KNEE ARTHROPLASTY  05/19/16    Dr Wisdom Remedies Medications:    No current facility-administered medications on file prior to encounter. Current Outpatient Medications on File Prior to Encounter   Medication Sig Dispense Refill    nitrofurantoin (MACRODANTIN) 50 MG capsule Take 50 mg by mouth daily      insulin lispro (HUMALOG) 100 UNIT/ML injection vial Inject 12 Units into the skin 3 times daily (before meals)      LANTUS SOLOSTAR 100 UNIT/ML injection pen Inject 35 units at night 15 pen 3    aspirin 81 MG tablet Take 1 tablet by mouth daily 90 tablet 3    atorvastatin (LIPITOR) 40 MG tablet Take 1 tablet by mouth daily 90 tablet 3    pregabalin (LYRICA) 75 MG capsule Take 1 capsule by mouth 2 times daily for 90 days.  180 capsule 0    ARIPiprazole (ABILIFY) 5 MG tablet TAKE 1 TABLET BY MOUTH DAILY 30 tablet 0    BASAGLAR KWIKPEN 100 UNIT/ML injection pen Inject 35 units nightly 15 mL 3    sertraline (ZOLOFT) 50 MG tablet TAKE (1) TABLET BY MOUTH DAILY 90 tablet 0    SURE COMFORT PEN NEEDLES 30G X 8 MM MISC USE AS DIRECTED FIVE TIMES A  each 10    Continuous Blood Gluc  (DEXCOM G6 ) CORETTA 1 Device by Does not apply route continuous 1 Device 0    Continuous Blood Gluc Sensor (DEXCOM G6 SENSOR) MISC 1 Device by Does not apply route continuous 3 each 11    Continuous Blood Gluc Transmit (DEXCOM G6 TRANSMITTER) MISC 1 Device by Does not apply route continuous 1 each 3    melatonin 3 MG TABS tablet TAKE 1 TABLET BY MOUTH EVERY NIGHT murmur, gallop or rub. Abdomen:  +bs, soft, nt, nd, no hepatosplenomegaly   Extremities: Extremities warm to touch, pink, with no edema. Psychiatric: mood and affect appropriate; judgement and insight intact  Musculoskeletal:  Rom, muscular strength intact; digits, nails normal    Data/  CBC:   Lab Results   Component Value Date    WBC 5.9 05/25/2020    RBC 3.02 05/25/2020    HGB 8.6 05/25/2020    HCT 25.7 05/25/2020    MCV 85.2 05/25/2020    MCH 28.6 05/25/2020    MCHC 33.5 05/25/2020    RDW 14.6 05/25/2020     05/25/2020    MPV 10.0 03/05/2020     BMP:    Lab Results   Component Value Date     05/25/2020    K 4.7 05/25/2020    K 3.8 10/05/2019     05/25/2020    CO2 18 05/25/2020    BUN 65 05/25/2020    LABALBU 3.2 05/24/2020    CREATININE 3.47 05/25/2020    CALCIUM 9.1 05/25/2020    GFRAA 16.2 05/25/2020    LABGLOM 13.4 05/25/2020    GLUCOSE 102 05/25/2020     Nm Lung Vent/perfusion (vq)    Result Date: 5/24/2020  EXAMINATION: NM LUNG VENT/PERFUSION (VQ) CLINICAL HISTORY: ELEVATED D-DIMER COMPARISONS: CHEST RADIOGRAPH EARLIER SAME DAY. FINDINGS: 1.0 millicuries technetium DTPA, and 5.1 mCi technetium MAA, administered in standard fashion. Static imaging obtained in anterior, posterior, right and left lateral, and right and left posterior oblique projections. Matched defect identified, right middle lobe. Area corresponds to region of airspace disease on chest radiograph. LOW PROBABILITY FOR PULMONARY EMBOLISM. Xr Chest Portable    Result Date: 5/24/2020  EXAMINATION: XR CHEST PORTABLE CLINICAL HISTORY: CHEST PAIN, SHORTNESS OF BREATH COMPARISONS: NOVEMBER 22, 2019 FINDINGS: Osseous structures intact. Cardiopericardial silhouette normal. Pulmonary vasculature normal. Lungs clear. NO ACUTE CARDIOPULMONARY DISEASE. Assessment/  55 yo lady with ckd stage 4. Risk factors of DM, HTN, CAD with stent. EF 60%. B/l cr in mid 2's. Followed by dr. Alexandrea Georges. Admitted with cp/sob.   Cr worse than baseline but slowly improving. CXR and v/q negative. Has anemia. Plan/  1- cont ivf as ordered  2- if cath needed, ok with proceeding with limiting dye load as much as possible  3- aranesp and check iron levels    Thank you for the consultation. Please do not hesitate to call with questions.     Sheila Powell

## 2020-05-25 NOTE — PROGRESS NOTES
darbepoetin chadwick-polysorbate  40 mcg Subcutaneous Weekly    sodium chloride flush  10 mL Intravenous 2 times per day    atorvastatin  20 mg Oral Nightly    aspirin  81 mg Oral Daily    pregabalin  75 mg Oral Daily    carvedilol  6.25 mg Oral BID WC    clopidogrel  75 mg Oral Daily    isosorbide mononitrate  120 mg Oral Daily    magnesium oxide  400 mg Oral Daily    sertraline  50 mg Oral Daily    ARIPiprazole  5 mg Oral Daily    insulin glargine  35 Units Subcutaneous Nightly    insulin lispro  12 Units Subcutaneous TID WC    insulin lispro  0-12 Units Subcutaneous TID WC    insulin lispro  0-6 Units Subcutaneous Nightly     PRN Meds: nitroGLYCERIN, ondansetron, morphine, sodium chloride flush, sodium chloride flush, acetaminophen **OR** acetaminophen, magnesium hydroxide, promethazine **OR** ondansetron, melatonin, glucose, dextrose, glucagon (rDNA), dextrose    Labs:   Recent Labs     05/24/20  1030 05/24/20  1132 05/25/20  0605 05/25/20  0608   WBC 7.0  --  5.8 5.9   HGB 8.9* 7.4* 8.5* 8.6*   HCT 26.1*  --  25.4* 25.7*     --  156 153     Recent Labs     05/24/20  1030 05/24/20  1132 05/25/20  0605   *  --  135   K 5.5*  --  4.7   CL 94*  --  102   CO2 17*  --  18*   BUN 71*  --  65*   CREATININE 3.76* 3.3* 3.47*   CALCIUM 9.4  --  9.1     Recent Labs     05/24/20  1030   AST 39*   ALT 42*   BILITOT 0.3   ALKPHOS 104     No results for input(s): INR in the last 72 hours.   Recent Labs     05/24/20  1030 05/24/20  1353 05/24/20  1747   CKTOTAL 231*  --   --    TROPONINI <0.010 <0.010 <0.010       Urinalysis:   Lab Results   Component Value Date    NITRU Negative 11/22/2019    WBCUA 6-10 11/25/2019    BACTERIA MANY 11/25/2019    RBCUA 3-5 11/25/2019    BLOODU SMALL 11/22/2019    SPECGRAV 1.013 11/22/2019    GLUCOSEU 250 11/22/2019       Radiology:   Most recent    Chest CT      WITH CONTRAST:  Results for orders placed during the hospital encounter of 12/16/14   CT Chest W Contrast of 10/02/19   XR CHEST STANDARD (2 VW)    Narrative EXAMINATION: Chest x-ray, 2 view    HISTORY: Shortness of breath    TECHNIQUE: Frontal and lateral views of the chest    COMPARISON: Chest x-ray from October 2, 2019    FINDINGS:     Cardiomediastinal silhouette is within normal limits. No pneumothorax, pleural effusion, or focal consolidation. Chronic cerebral compression deformity of a lower thoracic vertebral body. No acute osseous abnormality. Degenerative changes of the spine. Impression No acute intrathoracic process. XR CHEST STANDARD (2 VW)    Narrative History: Shortness of breath    COMPARISON: February 23, 2018 chest x-ray and December 16, 2014 CT chest.. FINDINGS:     Cardiac silhouette is at the upper limits of normal in size with cardiothoracic ratio of approximately 0.50, likely related to hypoinflation. Mediastinal contour unremarkable. Mild nonspecific prominence of the bronchovascular markings unchanged and   accentuated by hypoinflation. . There are no focal infiltrates, pulmonary edema or pleural effusions. Linear density within the anterior aspect of the cardiac silhouette on the lateral film is compatible with coronary vascular stents . Stable compression deformity of the T11 vertebral body since 2018 chest x-ray with approximately one third reduction in height. This was not present on the CT from December 16, 2014. Right mild to moderate and mild left acromial clavicular joint degenerative changes. Healed right-sided rib fractures unchanged. .        Impression Impression:     Stable chest x-ray since February 23, 2018. No acute infiltrates. Portable:   Results for orders placed during the hospital encounter of 05/24/20   XR CHEST PORTABLE    Narrative EXAMINATION: XR CHEST PORTABLE    CLINICAL HISTORY: CHEST PAIN, SHORTNESS OF BREATH    COMPARISONS: NOVEMBER 22, 2019    FINDINGS: Osseous structures intact.  Cardiopericardial silhouette normal. Pulmonary vasculature

## 2020-05-26 ENCOUNTER — APPOINTMENT (OUTPATIENT)
Dept: GENERAL RADIOLOGY | Age: 60
DRG: 246 | End: 2020-05-26
Payer: MEDICARE

## 2020-05-26 LAB
ANION GAP SERPL CALCULATED.3IONS-SCNC: 10 MEQ/L (ref 9–15)
BASOPHILS ABSOLUTE: 0 K/UL (ref 0–0.2)
BASOPHILS RELATIVE PERCENT: 0.6 %
BUN BLDV-MCNC: 62 MG/DL (ref 8–23)
CALCIUM SERPL-MCNC: 9.1 MG/DL (ref 8.5–9.9)
CHLORIDE BLD-SCNC: 108 MEQ/L (ref 95–107)
CO2: 21 MEQ/L (ref 20–31)
CREAT SERPL-MCNC: 3.42 MG/DL (ref 0.5–0.9)
EOSINOPHILS ABSOLUTE: 0.2 K/UL (ref 0–0.7)
EOSINOPHILS RELATIVE PERCENT: 3.7 %
FERRITIN: 63.3 NG/ML (ref 13–150)
FOLATE: 16.8 NG/ML (ref 7.3–26.1)
GFR AFRICAN AMERICAN: 16.4
GFR NON-AFRICAN AMERICAN: 13.6
GLUCOSE BLD-MCNC: 113 MG/DL (ref 70–99)
GLUCOSE BLD-MCNC: 127 MG/DL (ref 60–115)
GLUCOSE BLD-MCNC: 130 MG/DL (ref 60–115)
GLUCOSE BLD-MCNC: 132 MG/DL (ref 60–115)
GLUCOSE BLD-MCNC: 209 MG/DL (ref 60–115)
GLUCOSE BLD-MCNC: 80 MG/DL (ref 60–115)
HCT VFR BLD CALC: 25.7 % (ref 37–47)
HEMOGLOBIN: 8.6 G/DL (ref 12–16)
IRON SATURATION: 9 % (ref 11–46)
IRON: 22 UG/DL (ref 37–145)
LYMPHOCYTES ABSOLUTE: 1.5 K/UL (ref 1–4.8)
LYMPHOCYTES RELATIVE PERCENT: 29.4 %
MCH RBC QN AUTO: 28.4 PG (ref 27–31.3)
MCHC RBC AUTO-ENTMCNC: 33.3 % (ref 33–37)
MCV RBC AUTO: 85.3 FL (ref 82–100)
MONOCYTES ABSOLUTE: 0.5 K/UL (ref 0.2–0.8)
MONOCYTES RELATIVE PERCENT: 10.1 %
NEUTROPHILS ABSOLUTE: 2.9 K/UL (ref 1.4–6.5)
NEUTROPHILS RELATIVE PERCENT: 56.2 %
PDW BLD-RTO: 14.3 % (ref 11.5–14.5)
PERFORMED ON: ABNORMAL
PERFORMED ON: NORMAL
PLATELET # BLD: 148 K/UL (ref 130–400)
POTASSIUM SERPL-SCNC: 4.8 MEQ/L (ref 3.4–4.9)
RBC # BLD: 3.02 M/UL (ref 4.2–5.4)
SODIUM BLD-SCNC: 139 MEQ/L (ref 135–144)
TOTAL IRON BINDING CAPACITY: 239 UG/DL (ref 178–450)
VITAMIN B-12: 918 PG/ML (ref 232–1245)
WBC # BLD: 5.1 K/UL (ref 4.8–10.8)

## 2020-05-26 PROCEDURE — 2060000000 HC ICU INTERMEDIATE R&B

## 2020-05-26 PROCEDURE — 6360000002 HC RX W HCPCS: Performed by: INTERNAL MEDICINE

## 2020-05-26 PROCEDURE — 93010 ELECTROCARDIOGRAM REPORT: CPT | Performed by: INTERNAL MEDICINE

## 2020-05-26 PROCEDURE — 80048 BASIC METABOLIC PNL TOTAL CA: CPT

## 2020-05-26 PROCEDURE — 2580000003 HC RX 258: Performed by: INTERNAL MEDICINE

## 2020-05-26 PROCEDURE — 82607 VITAMIN B-12: CPT

## 2020-05-26 PROCEDURE — 6370000000 HC RX 637 (ALT 250 FOR IP): Performed by: FAMILY MEDICINE

## 2020-05-26 PROCEDURE — 82728 ASSAY OF FERRITIN: CPT

## 2020-05-26 PROCEDURE — 6370000000 HC RX 637 (ALT 250 FOR IP): Performed by: INTERNAL MEDICINE

## 2020-05-26 PROCEDURE — 82746 ASSAY OF FOLIC ACID SERUM: CPT

## 2020-05-26 PROCEDURE — 96372 THER/PROPH/DIAG INJ SC/IM: CPT

## 2020-05-26 PROCEDURE — 85025 COMPLETE CBC W/AUTO DIFF WBC: CPT

## 2020-05-26 PROCEDURE — 36415 COLL VENOUS BLD VENIPUNCTURE: CPT

## 2020-05-26 PROCEDURE — 71045 X-RAY EXAM CHEST 1 VIEW: CPT

## 2020-05-26 PROCEDURE — 83540 ASSAY OF IRON: CPT

## 2020-05-26 PROCEDURE — 2580000003 HC RX 258: Performed by: FAMILY MEDICINE

## 2020-05-26 PROCEDURE — 83550 IRON BINDING TEST: CPT

## 2020-05-26 RX ORDER — DIPHENOXYLATE HYDROCHLORIDE AND ATROPINE SULFATE 2.5; .025 MG/1; MG/1
1 TABLET ORAL 4 TIMES DAILY PRN
Status: DISCONTINUED | OUTPATIENT
Start: 2020-05-26 | End: 2020-05-30 | Stop reason: HOSPADM

## 2020-05-26 RX ORDER — FUROSEMIDE 10 MG/ML
20 INJECTION INTRAMUSCULAR; INTRAVENOUS ONCE
Status: COMPLETED | OUTPATIENT
Start: 2020-05-26 | End: 2020-05-26

## 2020-05-26 RX ADMIN — SODIUM CHLORIDE: 9 INJECTION, SOLUTION INTRAVENOUS at 06:48

## 2020-05-26 RX ADMIN — ASPIRIN 81 MG 81 MG: 81 TABLET ORAL at 08:24

## 2020-05-26 RX ADMIN — SERTRALINE HYDROCHLORIDE 50 MG: 50 TABLET ORAL at 08:24

## 2020-05-26 RX ADMIN — PREGABALIN 75 MG: 75 CAPSULE ORAL at 08:23

## 2020-05-26 RX ADMIN — Medication 10 ML: at 08:25

## 2020-05-26 RX ADMIN — CLOPIDOGREL BISULFATE 75 MG: 75 TABLET ORAL at 08:24

## 2020-05-26 RX ADMIN — ENOXAPARIN SODIUM 30 MG: 30 INJECTION SUBCUTANEOUS at 08:24

## 2020-05-26 RX ADMIN — ISOSORBIDE MONONITRATE 120 MG: 60 TABLET, EXTENDED RELEASE ORAL at 08:23

## 2020-05-26 RX ADMIN — ATORVASTATIN CALCIUM 20 MG: 20 TABLET, FILM COATED ORAL at 21:01

## 2020-05-26 RX ADMIN — INSULIN GLARGINE 35 UNITS: 100 INJECTION, SOLUTION SUBCUTANEOUS at 21:01

## 2020-05-26 RX ADMIN — Medication 10 ML: at 21:01

## 2020-05-26 RX ADMIN — CARVEDILOL 6.25 MG: 6.25 TABLET, FILM COATED ORAL at 08:24

## 2020-05-26 RX ADMIN — DIPHENOXYLATE HYDROCHLORIDE AND ATROPINE SULFATE 1 TABLET: 2.5; .025 TABLET ORAL at 10:29

## 2020-05-26 RX ADMIN — MELATONIN 3 MG: at 21:03

## 2020-05-26 RX ADMIN — Medication 400 MG: at 08:24

## 2020-05-26 RX ADMIN — ARIPIPRAZOLE 5 MG: 5 TABLET ORAL at 08:23

## 2020-05-26 RX ADMIN — DIPHENOXYLATE HYDROCHLORIDE AND ATROPINE SULFATE 1 TABLET: 2.5; .025 TABLET ORAL at 18:51

## 2020-05-26 RX ADMIN — FUROSEMIDE 20 MG: 10 INJECTION, SOLUTION INTRAVENOUS at 21:01

## 2020-05-26 RX ADMIN — CARVEDILOL 6.25 MG: 6.25 TABLET, FILM COATED ORAL at 16:50

## 2020-05-26 ASSESSMENT — PAIN SCALES - GENERAL: PAINLEVEL_OUTOF10: 0

## 2020-05-26 NOTE — PROGRESS NOTES
12/16/14   CT Chest W Contrast    Narrative CT CHEST WITH INTRAVENOUS CONTRAST 16 December, 2014     HISTORY Followup pulmonary nodules     COMPARISON CT Chest with IV contrast, 18 December, 2013 and all 3  other more remote CTs of the chest going back to 4 May, 2013 (the more  remote high resolution CT chest from 14 May 2005 is not available from  the archive)     TECHNIQUE Spiral CT of the chest after the uneventful administration  of 75 mL OptiRAY 320 intravenous contrast.  Coronal reformatted images  were reviewed. FINDINGS  The 8 mm right lung fissural lymph node on axial 31 of today's exam is  unchanged. Compared to the oldest available ET of the chest 4 May,  2013.     3 mm subpleural left lower lobe nodule on axial 41 is unchanged from  the older CT as well. No new noncalcified lung nodules. No consolidation or any other evidence of active infection in the  chest.     No pleural effusion, pneumothorax or endobronchial lesion. Few nonenlarged mediastinal lymph nodes are unchanged in size, number  and distribution. No jayden adenopathy in the chest.     No acute cardiovascular findings. Included upper abdomen, soft tissues of the chest wall and thoracic  skeleton of the acute findings. CONCLUSION     NO INTERVAL CHANGE. RIGHT LUNG FISSURAL LYMPH NODE IN THE SUBPLEURAL LEFT LOWER LOBE 3 MM  NODULE ARE BOTH UNCHANGED COMPARED TO THE OLDEST AVAILABLE PERTINENT  IMAGING, CT CHEST 4 MAY 2013. NO NEW LUNG NODULES. NO ADENOPATHY IN THE CHEST. NO ACUTE UNANTICIPATED INCIDENTAL FINDINGS. Daniella Laird M.D. Released Samantha Whitley M.D. Released Date Time- 12/16/14 1047   This document has been electronically signed. ------------------------------------------------------------------------------        WITHOUT CONTRAST: No results found for this or any previous visit.     CXR      2-view:   Results for orders placed during the hospital encounter of 10/02/19   XR CHEST STANDARD (2 VW)    Narrative EXAMINATION: Chest x-ray, 2 view    HISTORY: Shortness of breath    TECHNIQUE: Frontal and lateral views of the chest    COMPARISON: Chest x-ray from October 2, 2019    FINDINGS:     Cardiomediastinal silhouette is within normal limits. No pneumothorax, pleural effusion, or focal consolidation. Chronic cerebral compression deformity of a lower thoracic vertebral body. No acute osseous abnormality. Degenerative changes of the spine. Impression No acute intrathoracic process. XR CHEST STANDARD (2 VW)    Narrative History: Shortness of breath    COMPARISON: February 23, 2018 chest x-ray and December 16, 2014 CT chest.. FINDINGS:     Cardiac silhouette is at the upper limits of normal in size with cardiothoracic ratio of approximately 0.50, likely related to hypoinflation. Mediastinal contour unremarkable. Mild nonspecific prominence of the bronchovascular markings unchanged and   accentuated by hypoinflation. . There are no focal infiltrates, pulmonary edema or pleural effusions. Linear density within the anterior aspect of the cardiac silhouette on the lateral film is compatible with coronary vascular stents . Stable compression deformity of the T11 vertebral body since 2018 chest x-ray with approximately one third reduction in height. This was not present on the CT from December 16, 2014. Right mild to moderate and mild left acromial clavicular joint degenerative changes. Healed right-sided rib fractures unchanged. .        Impression Impression:     Stable chest x-ray since February 23, 2018. No acute infiltrates. Portable:   Results for orders placed during the hospital encounter of 05/24/20   XR CHEST PORTABLE    Narrative EXAMINATION: XR CHEST PORTABLE    CLINICAL HISTORY: CHEST PAIN, SHORTNESS OF BREATH    COMPARISONS: NOVEMBER 22, 2019    FINDINGS: Osseous structures intact.  Cardiopericardial silhouette normal. Pulmonary vasculature normal. Lungs clear. Impression NO ACUTE CARDIOPULMONARY DISEASE. Echo No results found for this or any previous visit. Assessment/Plan:    Active Hospital Problems    Diagnosis Date Noted    Chest pain [R07.9] 05/24/2020     Chest Pain: mgmt per cardio  DMII: Continue Humalog 12U TID, medium dose SSI, with 35U lantus    LUCÍA with CKD: multifactorial CKD with HTN, DMII. Avoid any further nephrotoxic agents. Follow bmp daily, nephrology following. Dyspnea: CXR now. D/C IVF. May need lasix x1 dose. Hyponatremia, Hyperkalemia resolved. Pt denies any NSAID use, possible mild RTA 4 2/2 Diabetic nephropathy. Follow UA. DVT PPX. Renal dose lovenox. Hold for any procedures if indicated in AM.     Additional work up or/and treatment plan may be added today or then after based on clinical progression. I am managing a portion of pt care. Some medical issues are handled byother specialists. Additional work up and treatment should be done in out pt setting by pt PCP and other out pt providers. In addition to examining and evaluating pt, I spent additional time explaining care, normaland abnormal findings, and treatment plan. All of pt questions were answered. Counseling, diet and education were provided. Case will be discussed with nursing staff when appropriate. Family will be updated if and whenappropriate.       Electronically signed by eLydi Webb DO on 5/26/2020 at 5:59 PM

## 2020-05-26 NOTE — FLOWSHEET NOTE
2200- Patient head to toe assessment completed, vitals stable. Patient resting comfortably. 0000- Patient resting, no c/o pain      0630- Patient had soft BM, requested something for diarrhea.  Will pass on to report    Electronically signed by Kenneth Kennedy RN on 5/26/2020 at 6:57 AM

## 2020-05-27 ENCOUNTER — APPOINTMENT (OUTPATIENT)
Dept: CARDIAC CATH/INVASIVE PROCEDURES | Age: 60
DRG: 246 | End: 2020-05-27
Payer: MEDICARE

## 2020-05-27 LAB
ANION GAP SERPL CALCULATED.3IONS-SCNC: 11 MEQ/L (ref 9–15)
BASOPHILS ABSOLUTE: 0 K/UL (ref 0–0.2)
BASOPHILS RELATIVE PERCENT: 0.5 %
BUN BLDV-MCNC: 59 MG/DL (ref 8–23)
CALCIUM SERPL-MCNC: 9.5 MG/DL (ref 8.5–9.9)
CHLORIDE BLD-SCNC: 111 MEQ/L (ref 95–107)
CO2: 20 MEQ/L (ref 20–31)
CREAT SERPL-MCNC: 3.49 MG/DL (ref 0.5–0.9)
EOSINOPHILS ABSOLUTE: 0.2 K/UL (ref 0–0.7)
EOSINOPHILS RELATIVE PERCENT: 3.7 %
GFR AFRICAN AMERICAN: 16.1
GFR NON-AFRICAN AMERICAN: 13.3
GLUCOSE BLD-MCNC: 107 MG/DL (ref 70–99)
GLUCOSE BLD-MCNC: 113 MG/DL (ref 60–115)
GLUCOSE BLD-MCNC: 120 MG/DL (ref 60–115)
GLUCOSE BLD-MCNC: 138 MG/DL (ref 60–115)
GLUCOSE BLD-MCNC: 242 MG/DL (ref 60–115)
HCT VFR BLD CALC: 26.4 % (ref 37–47)
HEMOGLOBIN: 8.8 G/DL (ref 12–16)
LYMPHOCYTES ABSOLUTE: 2 K/UL (ref 1–4.8)
LYMPHOCYTES RELATIVE PERCENT: 29.6 %
MCH RBC QN AUTO: 28.8 PG (ref 27–31.3)
MCHC RBC AUTO-ENTMCNC: 33.4 % (ref 33–37)
MCV RBC AUTO: 86.2 FL (ref 82–100)
MONOCYTES ABSOLUTE: 0.5 K/UL (ref 0.2–0.8)
MONOCYTES RELATIVE PERCENT: 7.2 %
NEUTROPHILS ABSOLUTE: 3.9 K/UL (ref 1.4–6.5)
NEUTROPHILS RELATIVE PERCENT: 59 %
PDW BLD-RTO: 14.5 % (ref 11.5–14.5)
PERFORMED ON: ABNORMAL
PERFORMED ON: NORMAL
PLATELET # BLD: 161 K/UL (ref 130–400)
POC ACTIVATED CLOTTING TIME KAOLIN: 197 SEC (ref 82–152)
POC SAMPLE TYPE: ABNORMAL
POTASSIUM SERPL-SCNC: 4.8 MEQ/L (ref 3.4–4.9)
RBC # BLD: 3.07 M/UL (ref 4.2–5.4)
SODIUM BLD-SCNC: 142 MEQ/L (ref 135–144)
WBC # BLD: 6.6 K/UL (ref 4.8–10.8)

## 2020-05-27 PROCEDURE — 2060000000 HC ICU INTERMEDIATE R&B

## 2020-05-27 PROCEDURE — 2709999900 HC NON-CHARGEABLE SUPPLY

## 2020-05-27 PROCEDURE — C1769 GUIDE WIRE: HCPCS

## 2020-05-27 PROCEDURE — 94664 DEMO&/EVAL PT USE INHALER: CPT

## 2020-05-27 PROCEDURE — 80048 BASIC METABOLIC PNL TOTAL CA: CPT

## 2020-05-27 PROCEDURE — C9600 PERC DRUG-EL COR STENT SING: HCPCS | Performed by: INTERNAL MEDICINE

## 2020-05-27 PROCEDURE — C1725 CATH, TRANSLUMIN NON-LASER: HCPCS

## 2020-05-27 PROCEDURE — C1887 CATHETER, GUIDING: HCPCS

## 2020-05-27 PROCEDURE — B2111ZZ FLUOROSCOPY OF MULTIPLE CORONARY ARTERIES USING LOW OSMOLAR CONTRAST: ICD-10-PCS | Performed by: INTERNAL MEDICINE

## 2020-05-27 PROCEDURE — 027034Z DILATION OF CORONARY ARTERY, ONE ARTERY WITH DRUG-ELUTING INTRALUMINAL DEVICE, PERCUTANEOUS APPROACH: ICD-10-PCS | Performed by: INTERNAL MEDICINE

## 2020-05-27 PROCEDURE — 6360000002 HC RX W HCPCS

## 2020-05-27 PROCEDURE — 2580000003 HC RX 258

## 2020-05-27 PROCEDURE — 93458 L HRT ARTERY/VENTRICLE ANGIO: CPT | Performed by: INTERNAL MEDICINE

## 2020-05-27 PROCEDURE — C1874 STENT, COATED/COV W/DEL SYS: HCPCS

## 2020-05-27 PROCEDURE — 2500000003 HC RX 250 WO HCPCS

## 2020-05-27 PROCEDURE — 6360000004 HC RX CONTRAST MEDICATION: Performed by: INTERNAL MEDICINE

## 2020-05-27 PROCEDURE — 94667 MNPJ CHEST WALL 1ST: CPT

## 2020-05-27 PROCEDURE — B2151ZZ FLUOROSCOPY OF LEFT HEART USING LOW OSMOLAR CONTRAST: ICD-10-PCS | Performed by: INTERNAL MEDICINE

## 2020-05-27 PROCEDURE — 92928 PRQ TCAT PLMT NTRAC ST 1 LES: CPT | Performed by: INTERNAL MEDICINE

## 2020-05-27 PROCEDURE — 6370000000 HC RX 637 (ALT 250 FOR IP): Performed by: FAMILY MEDICINE

## 2020-05-27 PROCEDURE — 82948 REAGENT STRIP/BLOOD GLUCOSE: CPT

## 2020-05-27 PROCEDURE — C1894 INTRO/SHEATH, NON-LASER: HCPCS

## 2020-05-27 PROCEDURE — 36415 COLL VENOUS BLD VENIPUNCTURE: CPT

## 2020-05-27 PROCEDURE — 2580000003 HC RX 258: Performed by: INTERNAL MEDICINE

## 2020-05-27 PROCEDURE — 85347 COAGULATION TIME ACTIVATED: CPT

## 2020-05-27 PROCEDURE — 4A023N7 MEASUREMENT OF CARDIAC SAMPLING AND PRESSURE, LEFT HEART, PERCUTANEOUS APPROACH: ICD-10-PCS | Performed by: INTERNAL MEDICINE

## 2020-05-27 PROCEDURE — 6370000000 HC RX 637 (ALT 250 FOR IP): Performed by: INTERNAL MEDICINE

## 2020-05-27 PROCEDURE — 94150 VITAL CAPACITY TEST: CPT

## 2020-05-27 PROCEDURE — 85025 COMPLETE CBC W/AUTO DIFF WBC: CPT

## 2020-05-27 RX ORDER — SODIUM CHLORIDE 9 MG/ML
INJECTION, SOLUTION INTRAVENOUS CONTINUOUS
Status: DISCONTINUED | OUTPATIENT
Start: 2020-05-27 | End: 2020-05-28

## 2020-05-27 RX ORDER — ALBUTEROL SULFATE 2.5 MG/3ML
2.5 SOLUTION RESPIRATORY (INHALATION) EVERY 6 HOURS PRN
Status: DISCONTINUED | OUTPATIENT
Start: 2020-05-27 | End: 2020-05-30 | Stop reason: HOSPADM

## 2020-05-27 RX ORDER — SODIUM CHLORIDE 9 MG/ML
INJECTION, SOLUTION INTRAVENOUS CONTINUOUS
Status: DISPENSED | OUTPATIENT
Start: 2020-05-27 | End: 2020-05-27

## 2020-05-27 RX ADMIN — ASPIRIN 81 MG 81 MG: 81 TABLET ORAL at 09:28

## 2020-05-27 RX ADMIN — CARVEDILOL 6.25 MG: 6.25 TABLET, FILM COATED ORAL at 18:00

## 2020-05-27 RX ADMIN — ARIPIPRAZOLE 5 MG: 5 TABLET ORAL at 09:28

## 2020-05-27 RX ADMIN — CLOPIDOGREL BISULFATE 75 MG: 75 TABLET ORAL at 09:28

## 2020-05-27 RX ADMIN — MELATONIN 3 MG: at 21:54

## 2020-05-27 RX ADMIN — ISOSORBIDE MONONITRATE 120 MG: 60 TABLET, EXTENDED RELEASE ORAL at 09:28

## 2020-05-27 RX ADMIN — SODIUM CHLORIDE: 9 INJECTION, SOLUTION INTRAVENOUS at 01:41

## 2020-05-27 RX ADMIN — Medication 400 MG: at 09:28

## 2020-05-27 RX ADMIN — Medication 10 ML: at 20:52

## 2020-05-27 RX ADMIN — IOPAMIDOL 50 ML: 612 INJECTION, SOLUTION INTRAVENOUS at 13:33

## 2020-05-27 RX ADMIN — SODIUM CHLORIDE: 9 INJECTION, SOLUTION INTRAVENOUS at 18:00

## 2020-05-27 RX ADMIN — PREGABALIN 75 MG: 75 CAPSULE ORAL at 09:28

## 2020-05-27 RX ADMIN — ATORVASTATIN CALCIUM 20 MG: 20 TABLET, FILM COATED ORAL at 20:52

## 2020-05-27 RX ADMIN — SERTRALINE HYDROCHLORIDE 50 MG: 50 TABLET ORAL at 09:28

## 2020-05-27 RX ADMIN — INSULIN GLARGINE 35 UNITS: 100 INJECTION, SOLUTION SUBCUTANEOUS at 20:52

## 2020-05-27 RX ADMIN — CARVEDILOL 6.25 MG: 6.25 TABLET, FILM COATED ORAL at 09:27

## 2020-05-27 NOTE — FLOWSHEET NOTE
Patient returned from cath lab. VS stable. Rt radial dressing dry and intact. Small shadow seen. Patient denies c/o at this time.

## 2020-05-27 NOTE — PROGRESS NOTES
Right radial d stat no bleeding or hematoma. Patient taking fluids at this time.   Voided 500 out on the bedpan

## 2020-05-27 NOTE — PROGRESS NOTES
chloride flush  10 mL Intravenous 2 times per day    atorvastatin  20 mg Oral Nightly    aspirin  81 mg Oral Daily    pregabalin  75 mg Oral Daily    carvedilol  6.25 mg Oral BID WC    clopidogrel  75 mg Oral Daily    isosorbide mononitrate  120 mg Oral Daily    magnesium oxide  400 mg Oral Daily    sertraline  50 mg Oral Daily    ARIPiprazole  5 mg Oral Daily    insulin glargine  35 Units Subcutaneous Nightly    insulin lispro  12 Units Subcutaneous TID WC    insulin lispro  0-12 Units Subcutaneous TID WC    insulin lispro  0-6 Units Subcutaneous Nightly     PRN Meds: diphenoxylate-atropine, nitroGLYCERIN, ondansetron, morphine, sodium chloride flush, sodium chloride flush, acetaminophen **OR** acetaminophen, magnesium hydroxide, promethazine **OR** ondansetron, melatonin, glucose, dextrose, glucagon (rDNA), dextrose    Labs:   Recent Labs     05/25/20  0608 05/26/20  0603 05/27/20  0619   WBC 5.9 5.1 6.6   HGB 8.6* 8.6* 8.8*   HCT 25.7* 25.7* 26.4*    148 161     Recent Labs     05/25/20  0605 05/26/20  0603 05/27/20  0619    139 142   K 4.7 4.8 4.8    108* 111*   CO2 18* 21 20   BUN 65* 62* 59*   CREATININE 3.47* 3.42* 3.49*   CALCIUM 9.1 9.1 9.5     Recent Labs     05/24/20  1030   AST 39*   ALT 42*   BILITOT 0.3   ALKPHOS 104     No results for input(s): INR in the last 72 hours.   Recent Labs     05/24/20  1030 05/24/20  1353 05/24/20  1747   CKTOTAL 231*  --   --    TROPONINI <0.010 <0.010 <0.010       Urinalysis:   Lab Results   Component Value Date    NITRU Negative 05/25/2020    WBCUA 0-2 05/25/2020    BACTERIA Negative 05/25/2020    RBCUA 0-2 05/25/2020    BLOODU Negative 05/25/2020    SPECGRAV 1.011 05/25/2020    GLUCOSEU Negative 05/25/2020       Radiology:   Most recent    Chest CT      WITH CONTRAST:  Results for orders placed during the hospital encounter of 12/16/14   CT Chest W Contrast    Narrative CT CHEST WITH INTRAVENOUS CONTRAST 16 December, 2014     HISTORY x-ray, 2 view    HISTORY: Shortness of breath    TECHNIQUE: Frontal and lateral views of the chest    COMPARISON: Chest x-ray from October 2, 2019    FINDINGS:     Cardiomediastinal silhouette is within normal limits. No pneumothorax, pleural effusion, or focal consolidation. Chronic cerebral compression deformity of a lower thoracic vertebral body. No acute osseous abnormality. Degenerative changes of the spine. Impression No acute intrathoracic process. XR CHEST STANDARD (2 VW)    Narrative History: Shortness of breath    COMPARISON: February 23, 2018 chest x-ray and December 16, 2014 CT chest.. FINDINGS:     Cardiac silhouette is at the upper limits of normal in size with cardiothoracic ratio of approximately 0.50, likely related to hypoinflation. Mediastinal contour unremarkable. Mild nonspecific prominence of the bronchovascular markings unchanged and   accentuated by hypoinflation. . There are no focal infiltrates, pulmonary edema or pleural effusions. Linear density within the anterior aspect of the cardiac silhouette on the lateral film is compatible with coronary vascular stents . Stable compression deformity of the T11 vertebral body since 2018 chest x-ray with approximately one third reduction in height. This was not present on the CT from December 16, 2014. Right mild to moderate and mild left acromial clavicular joint degenerative changes. Healed right-sided rib fractures unchanged. .        Impression Impression:     Stable chest x-ray since February 23, 2018. No acute infiltrates. Portable:   Results for orders placed during the hospital encounter of 05/24/20   XR CHEST PORTABLE    Narrative EXAMINATION: XR CHEST PORTABLE    CLINICAL HISTORY: CHEST PAIN, SHORTNESS OF BREATH    COMPARISONS: NOVEMBER 22, 2019    FINDINGS: Osseous structures intact. Cardiopericardial silhouette normal. Pulmonary vasculature normal. Lungs clear. Impression NO ACUTE CARDIOPULMONARY DISEASE. Echo No results found for this or any previous visit. Assessment/Plan:    Active Hospital Problems    Diagnosis Date Noted    Chest pain [R07.9] 05/24/2020     Chest Pain: mgmt per cardio. Plan for [facility]   DMII: Continue Humalog 12U TID, medium dose SSI, with 35U lantus    LUCÍA with CKD: multifactorial CKD with HTN, DMII. Avoid any further nephrotoxic agents. Follow bmp daily, nephrology following. Dyspnea: CXR ok. Prn inhalers updated     Hyponatremia, Hyperkalemia resolved. Pt denies any NSAID use, possible mild RTA 4 2/2 Diabetic nephropathy. DVT PPX. Renal dose lovenox. Hold for any procedures if indicated in AM.     Additional work up or/and treatment plan may be added today or then after based on clinical progression. I am managing a portion of pt care. Some medical issues are handled byother specialists. Additional work up and treatment should be done in out pt setting by pt PCP and other out pt providers. In addition to examining and evaluating pt, I spent additional time explaining care, normaland abnormal findings, and treatment plan. All of pt questions were answered. Counseling, diet and education were provided. Case will be discussed with nursing staff when appropriate. Family will be updated if and whenappropriate.       Electronically signed by Riya Shepard DO on 5/27/2020 at 9:55 AM

## 2020-05-27 NOTE — PROGRESS NOTES
[facility] [place] [facility] [place]   Current Order:  Albuterol q6 prn      Home Regimen: none      Ordering Physician: chel  Re-evaluation Date:  eval done     Diagnosis: chest pain      Patient Status: Stable / Unstable + Physician notified    The following MDI Criteria must be met in order to convert aerosol to MDI with spacer. If unable to meet, MDI will be converted to aerosol:  []  Patient able to demonstrate the ability to use MDI effectively  []  Patient alert and cooperative  []  Patient able to take deep breath with 5-10 second hold  []  Medication(s) available in this delivery method   []  Peak flow greater than or equal to 200 ml/min            Current Order Substituted To  (same drug, same frequency)   Aerosol to MDI [] Albuterol Sulfate 0.083% unit dose by aerosol Albuterol Sulfate MDI 2 puffs by inhalation with spacer    [] Levalbuterol 1.25 mg unit dose by aerosol Levalbuterol MDI 2 puffs by inhalation with spacer    [] Levalbuterol 0.63 mg unit dose by aerosol Levalbuterol MDI 2 puffs by inhalation with spacer    [] Ipratropium Bromide 0.02% unit dose by aerosol Ipratropium Bromide MDI 2 puffs by inhalation with spacer    [] Duoneb (Ipratropium + Albuterol) unit dose by aerosol Ipratropium MDI + Albuterol MDI 2 puffs by inhalation w/spacer   MDI to Aerosol [] Albuterol Sulfate MDI Albuterol Sulfate 0.083% unit dose by aerosol    [] Levalbuterol MDI 2 puffs by inhalation Levalbuterol 1.25 mg unit dose by aerosol    [] Ipratropium Bromide MDI by inhalation Ipratropium Bromide 0.02% unit dose by aerosol    [] Combivent (Ipratropium + Albuterol) MDI by inhalation Duoneb (Ipratropium + Albuterol) unit dose by aerosol   Treatment Assessment [Frequency/Schedule]:  Change frequency to: __________no change________________________________________per Protocol, P&T, [facility]      Points 0 1 2 3 4   Pulmonary Status  Non-Smoker  [x]   Smoking history   < 20 pack years  []   Smoking history  ?  20 pack years  []   Pulmonary Disorder  (acute or chronic)  []   Severe or Chronic w/ Exacerbation  []     Surgical Status No [x]   Surgeries     General []   Surgery Lower []   Abdominal Thoracic or []   Upper Abdominal Thoracic with  PulmonaryDisorder  []     Chest X-ray Clear/Not  Ordered     [x]  Chronic Changes  Results Pending  []  Infiltrates, atelectasis, pleural effusion, or edema  []  Infiltrates in more than one lobe []  Infiltrate + Atelectasis, &/or pleural effusion  []    Respiratory Pattern Regular,  RR = 12-20 [x]  Increased,  RR = 21-25 []  GONZALEZ, irregular,  or RR = 26-30 []  Decreased FEV1  or RR = 31-35 []  Severe SOB, use  of accessory muscles, or RR ? 35  []    Mental Status Alert, oriented,  Cooperative [x]  Confused but Follows commands []  Lethargic or unable to follow commands []  Obtunded  []  Comatose  []    Breath Sounds Clear to  auscultation  []  Decreased unilaterally or  in bases only []  Decreased  bilaterally  [x]  Crackles or intermittent wheezes []  Wheezes []    Cough Strong, Spontan., & nonproductive [x]  Strong,  spontaneous, &  productive []  Weak,  Nonproductive []  Weak, productive or  with wheezes []  No spontaneous  cough or may require suctioning []    Level of Activity Ambulatory [x]  Ambulatory w/ Assist  []  Non-ambulatory []  Paraplegic []  Quadriplegic []    Total    Score:___2____     Triage Score:__5______      Tri       Triage:     1. (>20) Freq: Q3    2. (16-20) Freq: Q4   3. (11-15) Freq: QID & Albuterol Q2 PRN    4. (6-10) Freq: TID & Albuterol Q2 PRN    5. (0-5) Freq Q4prn

## 2020-05-27 NOTE — PROGRESS NOTES
Pt returned from lab in stable condition. AOX3, reports CP is \"better\". D stat in place to right wrist. Intact, no signs of bleeding or hematoma.

## 2020-05-27 NOTE — PROGRESS NOTES
Nephrology Progress Note    Assessment:  CKD-4 d/t DM  DM type-2  OHDX unstable angina   Schizophrenia  Anemia      Plan:  Cardiac cath today patient understands risks told again re: kidneys  And ATN    Patient Active Problem List:     Coronary artery disease involving native heart with angina pectoris (HCC)     Schizophrenia, paranoid, chronic     Metabolic syndrome     Obesity     Vitamin B 12 deficiency     Cerebral microvascular disease     Mixed hyperlipidemia     Other hammer toe (acquired)     Vitamin D insufficiency     Incontinence of urine     Diabetic nephropathy with proteinuria (HCC)     HTN (hypertension)     History of type C viral hepatitis     Urinary incontinence due to cognitive impairment     History of seizures     Stented coronary artery-plan is to stay on Plavix indefinately per Dr Corbett Dakins     Hemiparesis, left [facility])     Angina, class II ([facility] [address]     CKD (chronic kidney disease) stage 4, GFR 15-29 ml/min (HCC)     Chronic painful diabetic neuropathy (HCC)     Tardive dyskinesia     Shortness of breath     Uncontrolled type 2 diabetes mellitus with hyperglycemia (HCC)     Diastolic CHF ([facility] [address]     Sleep apnea     Pulmonary hypertension (HCC)     Encephalopathy     Obesity (BMI 30-39. 9)     Edema     Closed supracondylar fracture of right humerus     Other chronic pain     Palliative care patient     Chest pain      Subjective:  Admit Date: 5/24/2020    Interval History: no chest pains today    Medications:  Scheduled Meds:   darbepoetin chadwick-polysorbate  40 mcg Subcutaneous Weekly    sodium chloride  500 mL Intravenous Once    enoxaparin  30 mg Subcutaneous Daily    sodium chloride flush  10 mL Intravenous 2 times per day    atorvastatin  20 mg Oral Nightly    aspirin  81 mg Oral Daily    pregabalin  75 mg Oral Daily    carvedilol  6.25 mg Oral BID WC    clopidogrel  75 mg Oral Daily    isosorbide mononitrate  120 mg Oral Daily    magnesium oxide  400 mg Oral Daily    sertraline  50 mg Oral Daily    ARIPiprazole  5 mg Oral Daily    insulin glargine  35 Units Subcutaneous Nightly    insulin lispro  12 Units Subcutaneous TID WC    insulin lispro  0-12 Units Subcutaneous TID WC    insulin lispro  0-6 Units Subcutaneous Nightly     Continuous Infusions:   sodium chloride 75 mL/hr at 05/27/20 0141    dextrose         CBC:   Recent Labs     05/26/20  0603 05/27/20  0619   WBC 5.1 6.6   HGB 8.6* 8.8*    161     CMP:    Recent Labs     05/25/20  0605 05/26/20  0603 05/27/20  0619    139 142   K 4.7 4.8 4.8    108* 111*   CO2 18* 21 20   BUN 65* 62* 59*   CREATININE 3.47* 3.42* 3.49*   GLUCOSE 102* 113* 107*   CALCIUM 9.1 9.1 9.5   LABGLOM 13.4* 13.6* 13.3*     Troponin:   Recent Labs     05/24/20  1747   TROPONINI <0.010     BNP: No results for input(s): BNP in the last 72 hours. INR: No results for input(s): INR in the last 72 hours. Lipids:   Recent Labs     05/25/20  0608   CHOL 107   TRIG 85   HDL 44     Liver:   Recent Labs     05/24/20  1030   AST 39*   ALT 42*   ALKPHOS 104   PROT 6.4   LABALBU 3.2*   BILITOT 0.3     Iron:    Recent Labs     05/26/20  0603   FERRITIN 63.3     Urinalysis: No results for input(s): UA in the last 72 hours.     Objective:  Vitals: BP (!) 158/66   Pulse 59   Temp 97.5 °F (36.4 °C) (Oral)   Resp 16   Ht 5' 7\" (1.702 m)   Wt 238 lb 9.6 oz (108.2 kg)   LMP  (LMP Unknown)   SpO2 98%   BMI 37.37 kg/m²    Wt Readings from Last 3 Encounters:   05/27/20 238 lb 9.6 oz (108.2 kg)   03/26/20 237 lb (107.5 kg)   02/05/20 240 lb (108.9 kg)      24HR INTAKE/OUTPUT:      Intake/Output Summary (Last 24 hours) at 5/27/2020 0942  Last data filed at 5/27/2020 0340  Gross per 24 hour   Intake 867 ml   Output 850 ml   Net 17 ml       General: alert, in no apparent distress  HEENT: normocephalic, atraumatic, anicteric  Neck: supple, no mass  Lungs: non-labored respirations, clear to auscultation bilaterally  Heart: regular rate and rhythm, 1/6 systolic murmurs or rubs  Abdomen: soft, non-tender, non-distended obese  Ext: no cyanosis, no peripheral edema  Neuro: alert and oriented, no gross abnormalities  Psych: normal mood and affect  Skin: no rash      Electronically signed by Lizzy Valle MD

## 2020-05-28 LAB
ANION GAP SERPL CALCULATED.3IONS-SCNC: 9 MEQ/L (ref 9–15)
BASOPHILS ABSOLUTE: 0 K/UL (ref 0–0.2)
BASOPHILS RELATIVE PERCENT: 0.6 %
BUN BLDV-MCNC: 47 MG/DL (ref 8–23)
CALCIUM SERPL-MCNC: 9.8 MG/DL (ref 8.5–9.9)
CHLORIDE BLD-SCNC: 109 MEQ/L (ref 95–107)
CO2: 21 MEQ/L (ref 20–31)
CREAT SERPL-MCNC: 2.48 MG/DL (ref 0.5–0.9)
EKG ATRIAL RATE: 55 BPM
EKG ATRIAL RATE: 58 BPM
EKG ATRIAL RATE: 59 BPM
EKG ATRIAL RATE: 65 BPM
EKG P AXIS: 41 DEGREES
EKG P AXIS: 65 DEGREES
EKG P AXIS: 67 DEGREES
EKG P AXIS: 77 DEGREES
EKG P-R INTERVAL: 242 MS
EKG P-R INTERVAL: 256 MS
EKG P-R INTERVAL: 258 MS
EKG P-R INTERVAL: 282 MS
EKG Q-T INTERVAL: 432 MS
EKG Q-T INTERVAL: 448 MS
EKG Q-T INTERVAL: 474 MS
EKG Q-T INTERVAL: 478 MS
EKG QRS DURATION: 126 MS
EKG QRS DURATION: 130 MS
EKG QRS DURATION: 132 MS
EKG QRS DURATION: 98 MS
EKG QTC CALCULATION (BAZETT): 424 MS
EKG QTC CALCULATION (BAZETT): 457 MS
EKG QTC CALCULATION (BAZETT): 465 MS
EKG QTC CALCULATION (BAZETT): 469 MS
EKG R AXIS: -51 DEGREES
EKG R AXIS: -54 DEGREES
EKG R AXIS: -55 DEGREES
EKG R AXIS: -57 DEGREES
EKG T AXIS: 18 DEGREES
EKG T AXIS: 78 DEGREES
EKG T AXIS: 79 DEGREES
EKG T AXIS: 82 DEGREES
EKG VENTRICULAR RATE: 55 BPM
EKG VENTRICULAR RATE: 58 BPM
EKG VENTRICULAR RATE: 59 BPM
EKG VENTRICULAR RATE: 65 BPM
EOSINOPHILS ABSOLUTE: 0.2 K/UL (ref 0–0.7)
EOSINOPHILS RELATIVE PERCENT: 2.6 %
GFR AFRICAN AMERICAN: 23.8
GFR NON-AFRICAN AMERICAN: 19.7
GLUCOSE BLD-MCNC: 110 MG/DL (ref 60–115)
GLUCOSE BLD-MCNC: 134 MG/DL (ref 60–115)
GLUCOSE BLD-MCNC: 54 MG/DL (ref 60–115)
GLUCOSE BLD-MCNC: 71 MG/DL (ref 60–115)
GLUCOSE BLD-MCNC: 84 MG/DL (ref 70–99)
GLUCOSE BLD-MCNC: 95 MG/DL (ref 60–115)
HCT VFR BLD CALC: 27.9 % (ref 37–47)
HEMOGLOBIN: 9.1 G/DL (ref 12–16)
LYMPHOCYTES ABSOLUTE: 1.8 K/UL (ref 1–4.8)
LYMPHOCYTES RELATIVE PERCENT: 27.8 %
MCH RBC QN AUTO: 28.1 PG (ref 27–31.3)
MCHC RBC AUTO-ENTMCNC: 32.8 % (ref 33–37)
MCV RBC AUTO: 85.7 FL (ref 82–100)
MONOCYTES ABSOLUTE: 0.4 K/UL (ref 0.2–0.8)
MONOCYTES RELATIVE PERCENT: 5.8 %
NEUTROPHILS ABSOLUTE: 4.1 K/UL (ref 1.4–6.5)
NEUTROPHILS RELATIVE PERCENT: 63.2 %
PDW BLD-RTO: 14.5 % (ref 11.5–14.5)
PERFORMED ON: ABNORMAL
PERFORMED ON: ABNORMAL
PERFORMED ON: NORMAL
PLATELET # BLD: 171 K/UL (ref 130–400)
POTASSIUM SERPL-SCNC: 5 MEQ/L (ref 3.4–4.9)
RBC # BLD: 3.25 M/UL (ref 4.2–5.4)
SODIUM BLD-SCNC: 139 MEQ/L (ref 135–144)
WBC # BLD: 6.6 K/UL (ref 4.8–10.8)

## 2020-05-28 PROCEDURE — 2060000000 HC ICU INTERMEDIATE R&B

## 2020-05-28 PROCEDURE — 6370000000 HC RX 637 (ALT 250 FOR IP): Performed by: FAMILY MEDICINE

## 2020-05-28 PROCEDURE — 93005 ELECTROCARDIOGRAM TRACING: CPT | Performed by: INTERNAL MEDICINE

## 2020-05-28 PROCEDURE — 6360000002 HC RX W HCPCS: Performed by: INTERNAL MEDICINE

## 2020-05-28 PROCEDURE — 85025 COMPLETE CBC W/AUTO DIFF WBC: CPT

## 2020-05-28 PROCEDURE — 6370000000 HC RX 637 (ALT 250 FOR IP): Performed by: INTERNAL MEDICINE

## 2020-05-28 PROCEDURE — 99232 SBSQ HOSP IP/OBS MODERATE 35: CPT | Performed by: INTERNAL MEDICINE

## 2020-05-28 PROCEDURE — 2580000003 HC RX 258: Performed by: INTERNAL MEDICINE

## 2020-05-28 PROCEDURE — 80048 BASIC METABOLIC PNL TOTAL CA: CPT

## 2020-05-28 PROCEDURE — 36415 COLL VENOUS BLD VENIPUNCTURE: CPT

## 2020-05-28 PROCEDURE — 94150 VITAL CAPACITY TEST: CPT

## 2020-05-28 RX ORDER — FUROSEMIDE 10 MG/ML
40 INJECTION INTRAMUSCULAR; INTRAVENOUS 2 TIMES DAILY
Status: DISCONTINUED | OUTPATIENT
Start: 2020-05-28 | End: 2020-05-30

## 2020-05-28 RX ORDER — DIPHENHYDRAMINE HCL 25 MG
25 TABLET ORAL EVERY 6 HOURS PRN
Status: DISCONTINUED | OUTPATIENT
Start: 2020-05-28 | End: 2020-05-30 | Stop reason: HOSPADM

## 2020-05-28 RX ADMIN — PROMETHAZINE HYDROCHLORIDE 12.5 MG: 12.5 TABLET ORAL at 00:12

## 2020-05-28 RX ADMIN — ISOSORBIDE MONONITRATE 120 MG: 60 TABLET, EXTENDED RELEASE ORAL at 08:07

## 2020-05-28 RX ADMIN — CARVEDILOL 6.25 MG: 6.25 TABLET, FILM COATED ORAL at 17:03

## 2020-05-28 RX ADMIN — Medication 10 ML: at 08:07

## 2020-05-28 RX ADMIN — PREGABALIN 75 MG: 75 CAPSULE ORAL at 08:06

## 2020-05-28 RX ADMIN — ATORVASTATIN CALCIUM 20 MG: 20 TABLET, FILM COATED ORAL at 21:35

## 2020-05-28 RX ADMIN — ENOXAPARIN SODIUM 30 MG: 30 INJECTION SUBCUTANEOUS at 08:07

## 2020-05-28 RX ADMIN — ARIPIPRAZOLE 5 MG: 5 TABLET ORAL at 08:06

## 2020-05-28 RX ADMIN — FUROSEMIDE 40 MG: 10 INJECTION, SOLUTION INTRAMUSCULAR; INTRAVENOUS at 17:03

## 2020-05-28 RX ADMIN — DIPHENHYDRAMINE HCL 25 MG: 25 TABLET ORAL at 23:55

## 2020-05-28 RX ADMIN — DIPHENHYDRAMINE HCL 25 MG: 25 TABLET ORAL at 17:03

## 2020-05-28 RX ADMIN — SERTRALINE HYDROCHLORIDE 50 MG: 50 TABLET ORAL at 08:07

## 2020-05-28 RX ADMIN — DIPHENHYDRAMINE HCL 25 MG: 25 TABLET ORAL at 11:28

## 2020-05-28 RX ADMIN — Medication 10 ML: at 21:35

## 2020-05-28 RX ADMIN — CLOPIDOGREL BISULFATE 75 MG: 75 TABLET ORAL at 08:07

## 2020-05-28 RX ADMIN — Medication 400 MG: at 08:07

## 2020-05-28 RX ADMIN — NITROGLYCERIN 0.4 MG: 0.4 TABLET, ORALLY DISINTEGRATING SUBLINGUAL at 21:35

## 2020-05-28 RX ADMIN — FUROSEMIDE 40 MG: 10 INJECTION, SOLUTION INTRAMUSCULAR; INTRAVENOUS at 13:38

## 2020-05-28 RX ADMIN — ASPIRIN 81 MG 81 MG: 81 TABLET ORAL at 08:07

## 2020-05-28 RX ADMIN — CARVEDILOL 6.25 MG: 6.25 TABLET, FILM COATED ORAL at 08:07

## 2020-05-28 RX ADMIN — DIPHENOXYLATE HYDROCHLORIDE AND ATROPINE SULFATE 1 TABLET: 2.5; .025 TABLET ORAL at 10:27

## 2020-05-28 ASSESSMENT — PAIN SCALES - GENERAL
PAINLEVEL_OUTOF10: 7
PAINLEVEL_OUTOF10: 0
PAINLEVEL_OUTOF10: 0

## 2020-05-28 ASSESSMENT — PAIN DESCRIPTION - LOCATION: LOCATION: CHEST

## 2020-05-28 ASSESSMENT — PAIN DESCRIPTION - ORIENTATION: ORIENTATION: MID

## 2020-05-28 NOTE — PROGRESS NOTES
Hospitalist Daily Progress Note  Name: Shweta Banks  Age: 56 y.o. Gender: female  CodeStatus: Full Code  Allergies: Codeine    Chief Complaint:Chest Pain (sob, dizziness, rigth ear pain x1 week. )    Primary Care Provider: Hernan Donahue MD  InpatientTreatment Team: Treatment Team: Attending Provider: Lisa Ramirez MD; Consulting Physician: Rose Birmingham DO; : Darlene Mccann RN; Utilization Reviewer: Laurence Banegas RN; Consulting Physician: Riya Shepard DO; Registered Nurse: Diana Ramirez RN; Tech: Violetta Bonilla; : Deepa Simons RN  Admission Date: 5/24/2020      Subjective: feels well. No chest pain. Physical Exam  Constitutional:       Appearance: Normal appearance. HENT:      Head: Normocephalic and atraumatic. Nose: Nose normal.      Mouth/Throat:      Mouth: Mucous membranes are moist.      Pharynx: Oropharynx is clear. Cardiovascular:      Rate and Rhythm: Normal rate and regular rhythm. Heart sounds: No murmur. No gallop. Pulmonary:      Effort: No respiratory distress. Breath sounds: Wheezing present. Abdominal:      General: There is no distension. Tenderness: There is no abdominal tenderness. There is no guarding. Musculoskeletal: Normal range of motion. Right lower leg: No edema. Left lower leg: No edema. Neurological:      General: No focal deficit present. Mental Status: She is alert and oriented to person, place, and time. Psychiatric:      Comments: Odd mood, affect.  Good comprehension         Review of Systems    Medications:  Reviewed    Infusion Medications:    dextrose       Scheduled Medications:    furosemide  40 mg Intravenous BID    darbepoetin chadwick-polysorbate  40 mcg Subcutaneous Weekly    sodium chloride  500 mL Intravenous Once    enoxaparin  30 mg Subcutaneous Daily    sodium chloride flush  10 mL Intravenous 2 times per day    atorvastatin  20 mg Oral Nightly CTs of the chest going back to 4 May, 2013 (the more  remote high resolution CT chest from 14 May 2005 is not available from  the archive)     TECHNIQUE Spiral CT of the chest after the uneventful administration  of 75 mL OptiRAY 320 intravenous contrast.  Coronal reformatted images  were reviewed. FINDINGS  The 8 mm right lung fissural lymph node on axial 31 of today's exam is  unchanged. Compared to the oldest available ET of the chest 4 May,  2013.     3 mm subpleural left lower lobe nodule on axial 41 is unchanged from  the older CT as well. No new noncalcified lung nodules. No consolidation or any other evidence of active infection in the  chest.     No pleural effusion, pneumothorax or endobronchial lesion. Few nonenlarged mediastinal lymph nodes are unchanged in size, number  and distribution. No jayden adenopathy in the chest.     No acute cardiovascular findings. Included upper abdomen, soft tissues of the chest wall and thoracic  skeleton of the acute findings. CONCLUSION     NO INTERVAL CHANGE. RIGHT LUNG FISSURAL LYMPH NODE IN THE SUBPLEURAL LEFT LOWER LOBE 3 MM  NODULE ARE BOTH UNCHANGED COMPARED TO THE OLDEST AVAILABLE PERTINENT  IMAGING, CT CHEST 4 MAY 2013. NO NEW LUNG NODULES. NO ADENOPATHY IN THE CHEST. NO ACUTE UNANTICIPATED INCIDENTAL FINDINGS. Arnulfo Méndez   M.JOE Released BySena Bletre M.D. Released Date Time- 12/16/14 1047   This document has been electronically signed. ------------------------------------------------------------------------------        WITHOUT CONTRAST: No results found for this or any previous visit.     CXR      2-view:   Results for orders placed during the hospital encounter of 10/02/19   XR CHEST STANDARD (2 VW)    Narrative EXAMINATION: Chest x-ray, 2 view    HISTORY: Shortness of breath    TECHNIQUE: Frontal and lateral views of the chest    COMPARISON: Chest Diagnosis Date Noted    Chest pain [R07.9] 05/24/2020     Chest Pain: mgmt per cardio. DMII: Continue Humalog TID, medium dose SSI, with lantus    LUCÍA with CKD: multifactorial CKD with HTN, DMII. Avoid any further nephrotoxic agents. Follow bmp daily, nephrology following. Dyspnea: CXR ok. Prn inhalers updated     Hyponatremia, Hyperkalemia resolved. Pt denies any NSAID use, possible mild RTA 4 2/2 Diabetic nephropathy. DVT PPX. Renal dose lovenox. Additional work up or/and treatment plan may be added today or then after based on clinical progression. I am managing a portion of pt care. Some medical issues are handled byother specialists. Additional work up and treatment should be done in out pt setting by pt PCP and other out pt providers. In addition to examining and evaluating pt, I spent additional time explaining care, normaland abnormal findings, and treatment plan. All of pt questions were answered. Counseling, diet and education were provided. Case will be discussed with nursing staff when appropriate. Family will be updated if and whenappropriate.       Electronically signed by Tad Lacy DO on 5/28/2020 at 1:27 PM

## 2020-05-28 NOTE — PROGRESS NOTES
Nephrology Progress Note    Assessment:  CKD-4 no nephrotoxic affect presenting at this time with dye exposure  OHDX CAD stent  DM type-2  Schizophrenia      Plan: follow bmp as out-patient if to be discharged today  Lasix dose IV now    Patient Active Problem List:     Coronary artery disease involving native heart with angina pectoris (HCC)     Schizophrenia, paranoid, chronic     Metabolic syndrome     Obesity     Vitamin B 12 deficiency     Cerebral microvascular disease     Mixed hyperlipidemia     Other hammer toe (acquired)     Vitamin D insufficiency     Incontinence of urine     Diabetic nephropathy with proteinuria (HCC)     HTN (hypertension)     History of type C viral hepatitis     Urinary incontinence due to cognitive impairment     History of seizures     Stented coronary artery-plan is to stay on Plavix indefinately per Dr Candice Trejo     Hemiparesis, left [facility])     Angina, class II ([address]     CKD (chronic kidney disease) stage 4, GFR 15-29 ml/min (HCC)     Chronic painful diabetic neuropathy (HCC)     Tardive dyskinesia     Shortness of breath     Uncontrolled type 2 diabetes mellitus with hyperglycemia (HCC)     Diastolic CHF ([facility] [address]     Sleep apnea     Pulmonary hypertension (HCC)     Encephalopathy     Obesity (BMI 30-39. 9)     Edema     Closed supracondylar fracture of right humerus     Other chronic pain     Palliative care patient     Chest pain      Subjective:  Admit Date: 5/24/2020    Interval History: no problems or concerns    Medications:  Scheduled Meds:   darbepoetin chadwick-polysorbate  40 mcg Subcutaneous Weekly    sodium chloride  500 mL Intravenous Once    enoxaparin  30 mg Subcutaneous Daily    sodium chloride flush  10 mL Intravenous 2 times per day    atorvastatin  20 mg Oral Nightly    aspirin  81 mg Oral Daily    pregabalin  75 mg Oral Daily    carvedilol  6.25 mg Oral BID WC    clopidogrel  75 mg Oral Daily    isosorbide mononitrate  120 mg Oral Daily    magnesium oxide  400 mg Oral Daily    sertraline  50 mg Oral Daily    ARIPiprazole  5 mg Oral Daily    insulin glargine  35 Units Subcutaneous Nightly    insulin lispro  12 Units Subcutaneous TID WC    insulin lispro  0-12 Units Subcutaneous TID WC    insulin lispro  0-6 Units Subcutaneous Nightly     Continuous Infusions:   sodium chloride 75 mL/hr at 05/27/20 1800    dextrose         CBC:   Recent Labs     05/27/20  0619 05/28/20  0622   WBC 6.6 6.6   HGB 8.8* 9.1*    171     CMP:    Recent Labs     05/26/20  0603 05/27/20  0619 05/28/20  0622    142 139   K 4.8 4.8 5.0*   * 111* 109*   CO2 21 20 21   BUN 62* 59* 47*   CREATININE 3.42* 3.49* 2.48*   GLUCOSE 113* 107* 84   CALCIUM 9.1 9.5 9.8   LABGLOM 13.6* 13.3* 19.7*     Troponin: No results for input(s): TROPONINI in the last 72 hours. BNP: No results for input(s): BNP in the last 72 hours. INR: No results for input(s): INR in the last 72 hours. Lipids: No results for input(s): CHOL, LDLDIRECT, TRIG, HDL, AMYLASE, LIPASE in the last 72 hours. Liver: No results for input(s): AST, ALT, ALKPHOS, PROT, LABALBU, BILITOT in the last 72 hours. Invalid input(s): BILDIR  Iron:    Recent Labs     05/26/20  0603   FERRITIN 63.3     Urinalysis: No results for input(s): UA in the last 72 hours.     Objective:  Vitals: BP (!) 174/74   Pulse 63   Temp 97.3 °F (36.3 °C) (Oral)   Resp 16   Ht 5' 7\" (1.702 m)   Wt 239 lb 14.4 oz (108.8 kg)   LMP  (LMP Unknown)   SpO2 97%   BMI 37.57 kg/m²    Wt Readings from Last 3 Encounters:   05/28/20 239 lb 14.4 oz (108.8 kg)   03/26/20 237 lb (107.5 kg)   02/05/20 240 lb (108.9 kg)      24HR INTAKE/OUTPUT:      Intake/Output Summary (Last 24 hours) at 5/28/2020 1031  Last data filed at 5/28/2020 0656  Gross per 24 hour   Intake --   Output 600 ml   Net -600 ml       General: alert, in no apparent distress  HEENT: normocephalic, atraumatic, anicteric  Neck: supple, no mass  Lungs: non-labored respirations, clear to auscultation bilaterally  Heart: regular rate and rhythm, no murmurs or rubs  Abdomen: soft, non-tender, non-distended  Ext: no cyanosis, no peripheral edema  Neuro: alert and oriented, no gross abnormalities  Psych: normal mood and affect  Skin: no rash      Electronically signed by Radha Hdez MD

## 2020-05-28 NOTE — PROGRESS NOTES
Progress Note  Patient: Jax Frausto  Unit/Bed: O776/G050-98  YOB: 1960  MRN: [id number]  Acct: [de-identified]   Admitting Diagnosis: Chest pain [R07.9]  Chest pain [R07.9]  Chest pain [R07.9]  Admit Date:  5/24/2020  Hospital Day: 2    Chief Complaint: dyspnea     Subjective/HPI: 56year old female with CKD, acute on chronic diastolic CHF, HTN, HPL, CAD with multiple PCI of the mid/distal LAD for recurrent restenosis. S/P PCI mid LAD yesterday, elevated LVEDP found. Renal function stable. Still short of breath, with orthopnea. EKG:  Review of Systems:   Review of Systems      Physical Examination:    BP (!) 174/74   Pulse 63   Temp 97.3 °F (36.3 °C) (Oral)   Resp 16   Ht 5' 7\" (1.702 m)   Wt 239 lb 14.4 oz (108.8 kg)   LMP  (LMP Unknown)   SpO2 97%   BMI 37.57 kg/m²    Physical Exam   Constitutional: She appears healthy. No distress. HENT:   Mouth/Throat: Oropharynx is clear. Eyes: Pupils are equal, round, and reactive to light. Neck: Normal range of motion. No JVD present. Cardiovascular: Regular rhythm, S1 normal, S2 normal, normal heart sounds and intact distal pulses. Exam reveals no gallop. No murmur heard. Pulses:       Radial pulses are 2+ on the right side. Dorsalis pedis pulses are 2+ on the right side. Pulmonary/Chest: Effort normal and breath sounds normal. She has no wheezes. She has no rales. She exhibits no tenderness. Abdominal: Soft. Bowel sounds are normal.   Musculoskeletal: Normal range of motion. General: No edema. Neurological: She is alert and oriented to person, place, and time. She has intact cranial nerves. Skin: Skin is warm and dry. No rash noted.        LABS:  CBC:   Lab Results   Component Value Date    WBC 6.6 05/28/2020    RBC 3.25 05/28/2020    HGB 9.1 05/28/2020    HCT 27.9 05/28/2020    MCV 85.7 05/28/2020    MCH 28.1 05/28/2020    MCHC 32.8 05/28/2020    RDW 14.5 05/28/2020     05/28/2020    MPV 10.0

## 2020-05-29 LAB
ANION GAP SERPL CALCULATED.3IONS-SCNC: 12 MEQ/L (ref 9–15)
BASOPHILS ABSOLUTE: 0 K/UL (ref 0–0.2)
BASOPHILS RELATIVE PERCENT: 0.5 %
BUN BLDV-MCNC: 41 MG/DL (ref 8–23)
CALCIUM SERPL-MCNC: 10 MG/DL (ref 8.5–9.9)
CHLORIDE BLD-SCNC: 107 MEQ/L (ref 95–107)
CO2: 21 MEQ/L (ref 20–31)
CREAT SERPL-MCNC: 2.82 MG/DL (ref 0.5–0.9)
EOSINOPHILS ABSOLUTE: 0.2 K/UL (ref 0–0.7)
EOSINOPHILS RELATIVE PERCENT: 3 %
GFR AFRICAN AMERICAN: 20.5
GFR NON-AFRICAN AMERICAN: 17
GLUCOSE BLD-MCNC: 118 MG/DL (ref 60–115)
GLUCOSE BLD-MCNC: 119 MG/DL (ref 60–115)
GLUCOSE BLD-MCNC: 119 MG/DL (ref 70–99)
GLUCOSE BLD-MCNC: 183 MG/DL (ref 60–115)
GLUCOSE BLD-MCNC: 245 MG/DL (ref 60–115)
HCT VFR BLD CALC: 29.6 % (ref 37–47)
HEMOGLOBIN: 9.9 G/DL (ref 12–16)
LYMPHOCYTES ABSOLUTE: 1.9 K/UL (ref 1–4.8)
LYMPHOCYTES RELATIVE PERCENT: 23.5 %
MCH RBC QN AUTO: 28.3 PG (ref 27–31.3)
MCHC RBC AUTO-ENTMCNC: 33.5 % (ref 33–37)
MCV RBC AUTO: 84.6 FL (ref 82–100)
MONOCYTES ABSOLUTE: 0.6 K/UL (ref 0.2–0.8)
MONOCYTES RELATIVE PERCENT: 7.5 %
NEUTROPHILS ABSOLUTE: 5.3 K/UL (ref 1.4–6.5)
NEUTROPHILS RELATIVE PERCENT: 65.5 %
PDW BLD-RTO: 14 % (ref 11.5–14.5)
PERFORMED ON: ABNORMAL
PLATELET # BLD: 196 K/UL (ref 130–400)
POTASSIUM SERPL-SCNC: 4.4 MEQ/L (ref 3.4–4.9)
RBC # BLD: 3.49 M/UL (ref 4.2–5.4)
SODIUM BLD-SCNC: 140 MEQ/L (ref 135–144)
WBC # BLD: 8 K/UL (ref 4.8–10.8)

## 2020-05-29 PROCEDURE — 99232 SBSQ HOSP IP/OBS MODERATE 35: CPT | Performed by: INTERNAL MEDICINE

## 2020-05-29 PROCEDURE — 80048 BASIC METABOLIC PNL TOTAL CA: CPT

## 2020-05-29 PROCEDURE — 6370000000 HC RX 637 (ALT 250 FOR IP): Performed by: INTERNAL MEDICINE

## 2020-05-29 PROCEDURE — 85025 COMPLETE CBC W/AUTO DIFF WBC: CPT

## 2020-05-29 PROCEDURE — 6370000000 HC RX 637 (ALT 250 FOR IP): Performed by: FAMILY MEDICINE

## 2020-05-29 PROCEDURE — 6360000002 HC RX W HCPCS: Performed by: INTERNAL MEDICINE

## 2020-05-29 PROCEDURE — 2060000000 HC ICU INTERMEDIATE R&B

## 2020-05-29 PROCEDURE — 2580000003 HC RX 258: Performed by: INTERNAL MEDICINE

## 2020-05-29 PROCEDURE — 36415 COLL VENOUS BLD VENIPUNCTURE: CPT

## 2020-05-29 RX ORDER — CARVEDILOL 12.5 MG/1
12.5 TABLET ORAL 2 TIMES DAILY WITH MEALS
Status: DISCONTINUED | OUTPATIENT
Start: 2020-05-29 | End: 2020-05-30 | Stop reason: HOSPADM

## 2020-05-29 RX ADMIN — ENOXAPARIN SODIUM 30 MG: 30 INJECTION SUBCUTANEOUS at 08:26

## 2020-05-29 RX ADMIN — Medication 400 MG: at 08:26

## 2020-05-29 RX ADMIN — SERTRALINE HYDROCHLORIDE 50 MG: 50 TABLET ORAL at 08:26

## 2020-05-29 RX ADMIN — FUROSEMIDE 40 MG: 10 INJECTION, SOLUTION INTRAMUSCULAR; INTRAVENOUS at 08:26

## 2020-05-29 RX ADMIN — Medication 10 ML: at 21:28

## 2020-05-29 RX ADMIN — DIPHENHYDRAMINE HCL 25 MG: 25 TABLET ORAL at 20:50

## 2020-05-29 RX ADMIN — DIPHENHYDRAMINE HCL 25 MG: 25 TABLET ORAL at 08:29

## 2020-05-29 RX ADMIN — Medication 10 ML: at 08:27

## 2020-05-29 RX ADMIN — PREGABALIN 75 MG: 75 CAPSULE ORAL at 08:26

## 2020-05-29 RX ADMIN — MELATONIN 3 MG: at 20:50

## 2020-05-29 RX ADMIN — FUROSEMIDE 40 MG: 10 INJECTION, SOLUTION INTRAMUSCULAR; INTRAVENOUS at 17:22

## 2020-05-29 RX ADMIN — INSULIN GLARGINE 35 UNITS: 100 INJECTION, SOLUTION SUBCUTANEOUS at 20:50

## 2020-05-29 RX ADMIN — CARVEDILOL 6.25 MG: 6.25 TABLET, FILM COATED ORAL at 08:26

## 2020-05-29 RX ADMIN — CLOPIDOGREL BISULFATE 75 MG: 75 TABLET ORAL at 08:26

## 2020-05-29 RX ADMIN — DIPHENHYDRAMINE HCL 25 MG: 25 TABLET ORAL at 14:37

## 2020-05-29 RX ADMIN — ARIPIPRAZOLE 5 MG: 5 TABLET ORAL at 08:26

## 2020-05-29 RX ADMIN — ASPIRIN 81 MG 81 MG: 81 TABLET ORAL at 08:26

## 2020-05-29 RX ADMIN — CARVEDILOL 12.5 MG: 12.5 TABLET, FILM COATED ORAL at 17:22

## 2020-05-29 RX ADMIN — ISOSORBIDE MONONITRATE 120 MG: 60 TABLET, EXTENDED RELEASE ORAL at 08:26

## 2020-05-29 RX ADMIN — DIPHENOXYLATE HYDROCHLORIDE AND ATROPINE SULFATE 1 TABLET: 2.5; .025 TABLET ORAL at 14:37

## 2020-05-29 RX ADMIN — ATORVASTATIN CALCIUM 20 MG: 20 TABLET, FILM COATED ORAL at 20:50

## 2020-05-29 ASSESSMENT — PAIN SCALES - GENERAL
PAINLEVEL_OUTOF10: 0

## 2020-05-29 NOTE — PROGRESS NOTES
2130- Pt c/o 7/10 CP/pressure. EKG obtained and shows NSR. 1 nitro given per orders. 2230- Pt stated that she is feeling much better now. No signs of distress noted.

## 2020-05-29 NOTE — PROGRESS NOTES
murmurs or rubs  Abdomen: soft, non-tender, non-distended obese  Ext: no cyanosis, no peripheral edema  Neuro: alert and oriented, no gross abnormalities  Psych: normal mood and affect  Skin: no rash      Electronically signed by Angelina Alejo MD

## 2020-05-29 NOTE — PROGRESS NOTES
Daily    pregabalin  75 mg Oral Daily    carvedilol  6.25 mg Oral BID     clopidogrel  75 mg Oral Daily    isosorbide mononitrate  120 mg Oral Daily    magnesium oxide  400 mg Oral Daily    sertraline  50 mg Oral Daily    ARIPiprazole  5 mg Oral Daily    insulin glargine  35 Units Subcutaneous Nightly    insulin lispro  12 Units Subcutaneous TID     insulin lispro  0-12 Units Subcutaneous TID     insulin lispro  0-6 Units Subcutaneous Nightly     PRN Meds: diphenhydrAMINE, albuterol, diphenoxylate-atropine, ondansetron, morphine, sodium chloride flush, sodium chloride flush, acetaminophen **OR** acetaminophen, magnesium hydroxide, promethazine **OR** ondansetron, melatonin, glucose, dextrose, glucagon (rDNA), dextrose    Labs:   Recent Labs     05/27/20 0619 05/28/20 0622 05/29/20  0605   WBC 6.6 6.6 8.0   HGB 8.8* 9.1* 9.9*   HCT 26.4* 27.9* 29.6*    171 196     Recent Labs     05/27/20 0619 05/28/20 0622 05/29/20  0605    139 140   K 4.8 5.0* 4.4   * 109* 107   CO2 20 21 21   BUN 59* 47* 41*   CREATININE 3.49* 2.48* 2.82*   CALCIUM 9.5 9.8 10.0*     No results for input(s): AST, ALT, BILIDIR, BILITOT, ALKPHOS in the last 72 hours. No results for input(s): INR in the last 72 hours. No results for input(s): [place] Renetta in the last 72 hours.     Urinalysis:   Lab Results   Component Value Date    NITRU Negative 05/25/2020    WBCUA 0-2 05/25/2020    BACTERIA Negative 05/25/2020    RBCUA 0-2 05/25/2020    BLOODU Negative 05/25/2020    SPECGRAV 1.011 05/25/2020    GLUCOSEU Negative 05/25/2020       Radiology:   Most recent    Chest CT      WITH CONTRAST:  Results for orders placed during the hospital encounter of 12/16/14   CT Chest W Contrast    Narrative CT CHEST WITH INTRAVENOUS CONTRAST 16 December, 2014     HISTORY Followup pulmonary nodules     COMPARISON CT Chest with IV contrast, 18 December, 2013 and all 3  other more remote CTs of the chest going back to 4 May, 2013 (the more  remote high resolution CT chest from 14 May 2005 is not available from  the archive)     TECHNIQUE Spiral CT of the chest after the uneventful administration  of 75 mL OptiRAY 320 intravenous contrast.  Coronal reformatted images  were reviewed. FINDINGS  The 8 mm right lung fissural lymph node on axial 31 of today's exam is  unchanged. Compared to the oldest available ET of the chest 4 May,  2013.     3 mm subpleural left lower lobe nodule on axial 41 is unchanged from  the older CT as well. No new noncalcified lung nodules. No consolidation or any other evidence of active infection in the  chest.     No pleural effusion, pneumothorax or endobronchial lesion. Few nonenlarged mediastinal lymph nodes are unchanged in size, number  and distribution. No jayden adenopathy in the chest.     No acute cardiovascular findings. Included upper abdomen, soft tissues of the chest wall and thoracic  skeleton of the acute findings. CONCLUSION     NO INTERVAL CHANGE. RIGHT LUNG FISSURAL LYMPH NODE IN THE SUBPLEURAL LEFT LOWER LOBE 3 MM  NODULE ARE BOTH UNCHANGED COMPARED TO THE OLDEST AVAILABLE PERTINENT  IMAGING, CT CHEST 4 MAY 2013. NO NEW LUNG NODULES. NO ADENOPATHY IN THE CHEST. NO ACUTE UNANTICIPATED INCIDENTAL FINDINGS. Ti Echavarria M.D. Released ByMaritza Ocasio M.D. Released Date Time- 12/16/14 1047   This document has been electronically signed. ------------------------------------------------------------------------------        WITHOUT CONTRAST: No results found for this or any previous visit.     CXR      2-view:   Results for orders placed during the hospital encounter of 10/02/19   XR CHEST STANDARD (2 VW)    Narrative EXAMINATION: Chest x-ray, 2 view    HISTORY: Shortness of breath    TECHNIQUE: Frontal and lateral views of the chest    COMPARISON: Chest x-ray from October 2, 2019    FINDINGS: Cardiomediastinal silhouette is within normal limits. No pneumothorax, pleural effusion, or focal consolidation. Chronic cerebral compression deformity of a lower thoracic vertebral body. No acute osseous abnormality. Degenerative changes of the spine. Impression No acute intrathoracic process. XR CHEST STANDARD (2 VW)    Narrative History: Shortness of breath    COMPARISON: February 23, 2018 chest x-ray and December 16, 2014 CT chest.. FINDINGS:     Cardiac silhouette is at the upper limits of normal in size with cardiothoracic ratio of approximately 0.50, likely related to hypoinflation. Mediastinal contour unremarkable. Mild nonspecific prominence of the bronchovascular markings unchanged and   accentuated by hypoinflation. . There are no focal infiltrates, pulmonary edema or pleural effusions. Linear density within the anterior aspect of the cardiac silhouette on the lateral film is compatible with coronary vascular stents . Stable compression deformity of the T11 vertebral body since 2018 chest x-ray with approximately one third reduction in height. This was not present on the CT from December 16, 2014. Right mild to moderate and mild left acromial clavicular joint degenerative changes. Healed right-sided rib fractures unchanged. .        Impression Impression:     Stable chest x-ray since February 23, 2018. No acute infiltrates. Portable:   Results for orders placed during the hospital encounter of 05/24/20   XR CHEST PORTABLE    Narrative EXAMINATION: XR CHEST PORTABLE    CLINICAL HISTORY: CHEST PAIN, SHORTNESS OF BREATH    COMPARISONS: NOVEMBER 22, 2019    FINDINGS: Osseous structures intact. Cardiopericardial silhouette normal. Pulmonary vasculature normal. Lungs clear. Impression NO ACUTE CARDIOPULMONARY DISEASE. Echo No results found for this or any previous visit.           Assessment/Plan:    Active Hospital Problems    Diagnosis Date Noted    Chest pain [R07.9]

## 2020-05-30 VITALS
HEIGHT: 67 IN | DIASTOLIC BLOOD PRESSURE: 74 MMHG | BODY MASS INDEX: 36.18 KG/M2 | HEART RATE: 60 BPM | OXYGEN SATURATION: 99 % | WEIGHT: 230.5 LBS | RESPIRATION RATE: 17 BRPM | TEMPERATURE: 97.9 F | SYSTOLIC BLOOD PRESSURE: 164 MMHG

## 2020-05-30 LAB
ALBUMIN SERPL-MCNC: 3.5 G/DL (ref 3.5–4.6)
ALP BLD-CCNC: 118 U/L (ref 40–130)
ALT SERPL-CCNC: 24 U/L (ref 0–33)
ANION GAP SERPL CALCULATED.3IONS-SCNC: 12 MEQ/L (ref 9–15)
AST SERPL-CCNC: 16 U/L (ref 0–35)
BASOPHILS ABSOLUTE: 0.1 K/UL (ref 0–0.2)
BASOPHILS RELATIVE PERCENT: 0.6 %
BILIRUB SERPL-MCNC: 0.5 MG/DL (ref 0.2–0.7)
BUN BLDV-MCNC: 40 MG/DL (ref 8–23)
CALCIUM SERPL-MCNC: 10 MG/DL (ref 8.5–9.9)
CHLORIDE BLD-SCNC: 103 MEQ/L (ref 95–107)
CO2: 24 MEQ/L (ref 20–31)
CREAT SERPL-MCNC: 2.91 MG/DL (ref 0.5–0.9)
EOSINOPHILS ABSOLUTE: 0.3 K/UL (ref 0–0.7)
EOSINOPHILS RELATIVE PERCENT: 3.6 %
GFR AFRICAN AMERICAN: 19.8
GFR NON-AFRICAN AMERICAN: 16.4
GLOBULIN: 3.4 G/DL (ref 2.3–3.5)
GLUCOSE BLD-MCNC: 135 MG/DL (ref 70–99)
GLUCOSE BLD-MCNC: 148 MG/DL (ref 60–115)
HCT VFR BLD CALC: 30.6 % (ref 37–47)
HEMOGLOBIN: 10.4 G/DL (ref 12–16)
LYMPHOCYTES ABSOLUTE: 2 K/UL (ref 1–4.8)
LYMPHOCYTES RELATIVE PERCENT: 24 %
MCH RBC QN AUTO: 28.4 PG (ref 27–31.3)
MCHC RBC AUTO-ENTMCNC: 33.9 % (ref 33–37)
MCV RBC AUTO: 83.8 FL (ref 82–100)
MONOCYTES ABSOLUTE: 0.6 K/UL (ref 0.2–0.8)
MONOCYTES RELATIVE PERCENT: 7.1 %
NEUTROPHILS ABSOLUTE: 5.3 K/UL (ref 1.4–6.5)
NEUTROPHILS RELATIVE PERCENT: 64.7 %
PDW BLD-RTO: 14.2 % (ref 11.5–14.5)
PERFORMED ON: ABNORMAL
PLATELET # BLD: 203 K/UL (ref 130–400)
POTASSIUM SERPL-SCNC: 4.4 MEQ/L (ref 3.4–4.9)
RBC # BLD: 3.65 M/UL (ref 4.2–5.4)
SODIUM BLD-SCNC: 139 MEQ/L (ref 135–144)
TOTAL PROTEIN: 6.9 G/DL (ref 6.3–8)
WBC # BLD: 8.2 K/UL (ref 4.8–10.8)

## 2020-05-30 PROCEDURE — 2580000003 HC RX 258: Performed by: INTERNAL MEDICINE

## 2020-05-30 PROCEDURE — 6370000000 HC RX 637 (ALT 250 FOR IP): Performed by: FAMILY MEDICINE

## 2020-05-30 PROCEDURE — 6370000000 HC RX 637 (ALT 250 FOR IP): Performed by: INTERNAL MEDICINE

## 2020-05-30 PROCEDURE — 36415 COLL VENOUS BLD VENIPUNCTURE: CPT

## 2020-05-30 PROCEDURE — 85025 COMPLETE CBC W/AUTO DIFF WBC: CPT

## 2020-05-30 PROCEDURE — 99239 HOSP IP/OBS DSCHRG MGMT >30: CPT | Performed by: INTERNAL MEDICINE

## 2020-05-30 PROCEDURE — 80053 COMPREHEN METABOLIC PANEL: CPT

## 2020-05-30 RX ORDER — ATORVASTATIN CALCIUM 80 MG/1
80 TABLET, FILM COATED ORAL NIGHTLY
Status: DISCONTINUED | OUTPATIENT
Start: 2020-05-30 | End: 2020-05-30 | Stop reason: HOSPADM

## 2020-05-30 RX ORDER — ISOSORBIDE MONONITRATE 120 MG/1
120 TABLET, EXTENDED RELEASE ORAL DAILY
Qty: 30 TABLET | Refills: 3 | Status: SHIPPED | OUTPATIENT
Start: 2020-05-30 | End: 2020-06-01

## 2020-05-30 RX ORDER — FUROSEMIDE 40 MG/1
40 TABLET ORAL DAILY
Status: DISCONTINUED | OUTPATIENT
Start: 2020-05-30 | End: 2020-05-30 | Stop reason: HOSPADM

## 2020-05-30 RX ORDER — ALBUTEROL SULFATE 2.5 MG/3ML
2.5 SOLUTION RESPIRATORY (INHALATION) EVERY 6 HOURS PRN
Qty: 120 EACH | Refills: 3 | Status: SHIPPED | OUTPATIENT
Start: 2020-05-30 | End: 2020-06-16

## 2020-05-30 RX ORDER — CARVEDILOL 12.5 MG/1
12.5 TABLET ORAL 2 TIMES DAILY WITH MEALS
Qty: 60 TABLET | Refills: 3 | Status: SHIPPED | OUTPATIENT
Start: 2020-05-30 | End: 2020-06-01

## 2020-05-30 RX ORDER — AMLODIPINE BESYLATE 10 MG/1
10 TABLET ORAL DAILY
Qty: 30 TABLET | Refills: 3 | Status: SHIPPED | OUTPATIENT
Start: 2020-05-30 | End: 2020-06-01

## 2020-05-30 RX ORDER — PANTOPRAZOLE SODIUM 20 MG/1
20 TABLET, DELAYED RELEASE ORAL DAILY
Qty: 30 TABLET | Refills: 3 | Status: SHIPPED | OUTPATIENT
Start: 2020-05-30 | End: 2020-06-01

## 2020-05-30 RX ORDER — FUROSEMIDE 40 MG/1
40 TABLET ORAL DAILY
Qty: 60 TABLET | Refills: 3 | Status: SHIPPED | OUTPATIENT
Start: 2020-05-30 | End: 2020-06-01

## 2020-05-30 RX ORDER — AMLODIPINE BESYLATE 10 MG/1
10 TABLET ORAL DAILY
Status: DISCONTINUED | OUTPATIENT
Start: 2020-05-30 | End: 2020-05-30 | Stop reason: HOSPADM

## 2020-05-30 RX ADMIN — CARVEDILOL 12.5 MG: 12.5 TABLET, FILM COATED ORAL at 08:34

## 2020-05-30 RX ADMIN — FUROSEMIDE 40 MG: 40 TABLET ORAL at 08:48

## 2020-05-30 RX ADMIN — ISOSORBIDE MONONITRATE 120 MG: 60 TABLET, EXTENDED RELEASE ORAL at 08:34

## 2020-05-30 RX ADMIN — Medication 10 ML: at 08:35

## 2020-05-30 RX ADMIN — PREGABALIN 75 MG: 75 CAPSULE ORAL at 08:34

## 2020-05-30 RX ADMIN — DIPHENOXYLATE HYDROCHLORIDE AND ATROPINE SULFATE 1 TABLET: 2.5; .025 TABLET ORAL at 08:48

## 2020-05-30 RX ADMIN — Medication 400 MG: at 08:34

## 2020-05-30 RX ADMIN — ASPIRIN 81 MG 81 MG: 81 TABLET ORAL at 08:34

## 2020-05-30 RX ADMIN — ARIPIPRAZOLE 5 MG: 5 TABLET ORAL at 08:34

## 2020-05-30 RX ADMIN — AMLODIPINE BESYLATE 10 MG: 10 TABLET ORAL at 08:48

## 2020-05-30 RX ADMIN — DIPHENHYDRAMINE HCL 25 MG: 25 TABLET ORAL at 08:48

## 2020-05-30 RX ADMIN — CLOPIDOGREL BISULFATE 75 MG: 75 TABLET ORAL at 08:34

## 2020-05-30 RX ADMIN — SERTRALINE HYDROCHLORIDE 50 MG: 50 TABLET ORAL at 08:34

## 2020-05-30 ASSESSMENT — PAIN SCALES - GENERAL
PAINLEVEL_OUTOF10: 0
PAINLEVEL_OUTOF10: 0

## 2020-05-30 NOTE — DISCHARGE SUMMARY
START taking these medications    Details   albuterol (PROVENTIL) (2.5 MG/3ML) 0.083% nebulizer solution Take 3 mLs by nebulization every 6 hours as needed for Wheezing  Qty: 120 each, Refills: 3      furosemide (LASIX) 40 MG tablet Take 1 tablet by mouth daily  Qty: 60 tablet, Refills: 3         CONTINUE these medications which have CHANGED    Details   isosorbide mononitrate (IMDUR) 120 MG extended release tablet Take 1 tablet by mouth daily  Qty: 30 tablet, Refills: 3      carvedilol (COREG) 12.5 MG tablet Take 1 tablet by mouth 2 times daily (with meals)  Qty: 60 tablet, Refills: 3      amLODIPine (NORVASC) 10 MG tablet Take 1 tablet by mouth daily  Qty: 30 tablet, Refills: 3      pantoprazole (PROTONIX) 20 MG tablet Take 1 tablet by mouth daily  Qty: 30 tablet, Refills: 3         CONTINUE these medications which have NOT CHANGED    Details   nitrofurantoin (MACRODANTIN) 50 MG capsule Take 50 mg by mouth daily      insulin lispro (HUMALOG) 100 UNIT/ML injection vial Inject 12 Units into the skin 3 times daily (before meals)      !! LANTUS SOLOSTAR 100 UNIT/ML injection pen Inject 35 units at night  Qty: 15 pen, Refills: 3      aspirin 81 MG tablet Take 1 tablet by mouth daily  Qty: 90 tablet, Refills: 3      atorvastatin (LIPITOR) 40 MG tablet Take 1 tablet by mouth daily  Qty: 90 tablet, Refills: 3      pregabalin (LYRICA) 75 MG capsule Take 1 capsule by mouth 2 times daily for 90 days. Qty: 180 capsule, Refills: 0    Associated Diagnoses: Chronic painful diabetic neuropathy (HCC)      ARIPiprazole (ABILIFY) 5 MG tablet TAKE 1 TABLET BY MOUTH DAILY  Qty: 30 tablet, Refills: 0      !!  BASAGLAR KWIKPEN 100 UNIT/ML injection pen Inject 35 units nightly  Qty: 15 mL, Refills: 3      sertraline (ZOLOFT) 50 MG tablet TAKE (1) TABLET BY MOUTH DAILY  Qty: 90 tablet, Refills: 0      SURE COMFORT PEN NEEDLES 30G X 8 MM MISC USE AS DIRECTED FIVE TIMES A DAY  Qty: 100 each, Refills: 10      Continuous Blood Gluc  (DEXCOM G6 ) CORETTA 1 Device by Does not apply route continuous  Qty: 1 Device, Refills: 0      Continuous Blood Gluc Sensor (DEXCOM G6 SENSOR) MISC 1 Device by Does not apply route continuous  Qty: 3 each, Refills: 11      Continuous Blood Gluc Transmit (DEXCOM G6 TRANSMITTER) MISC 1 Device by Does not apply route continuous  Qty: 1 each, Refills: 3      melatonin 3 MG TABS tablet TAKE 1 TABLET BY MOUTH EVERY NIGHT AS NEEDED FOR INSOMNIA  Qty: 180 tablet, Refills: 4      nitroGLYCERIN (NITROSTAT) 0.4 MG SL tablet Place 1 tablet under the tongue every 5 minutes as needed for Chest pain      clopidogrel (PLAVIX) 75 MG tablet Take 75 mg by mouth daily      magnesium oxide (MAG-OX) 400 MG tablet Take 400 mg by mouth daily      vitamin B-12 (CYANOCOBALAMIN) 100 MCG tablet TAKE 1 TABLET BY MOUTH DAILY  Qty: 30 tablet, Refills: 11      D3-1000 1000 units CAPS TAKE 1 CAPSULE BY MOUTH ONCE DAILY  Qty: 90 capsule, Refills: 2       !! - Potential duplicate medications found. Please discuss with provider. STOP taking these medications       sulfamethoxazole-trimethoprim (BACTRIM DS) 800-160 MG per tablet Comments:   Reason for Stopping:         traZODone (DESYREL) 50 MG tablet Comments:   Reason for Stopping:         nystatin (MYCOSTATIN) 469971 UNIT/GM powder Comments:   Reason for Stopping:         loperamide (IMODIUM) 2 MG capsule Comments:   Reason for Stopping:               Significant Diagnostics:   Radiology: Nm Lung Vent/perfusion (vq)    Result Date: 5/24/2020  EXAMINATION: NM LUNG VENT/PERFUSION (VQ) CLINICAL HISTORY: ELEVATED D-DIMER COMPARISONS: CHEST RADIOGRAPH EARLIER SAME DAY. FINDINGS: 1.0 millicuries technetium DTPA, and 5.1 mCi technetium MAA, administered in standard fashion. Static imaging obtained in anterior, posterior, right and left lateral, and right and left posterior oblique projections. Matched defect identified, right middle lobe.  Area corresponds to region of airspace disease on chest radiograph. LOW PROBABILITY FOR PULMONARY EMBOLISM. Xr Chest Portable    Result Date: 5/24/2020  EXAMINATION: XR CHEST PORTABLE CLINICAL HISTORY: CHEST PAIN, SHORTNESS OF BREATH COMPARISONS: NOVEMBER 22, 2019 FINDINGS: Osseous structures intact. Cardiopericardial silhouette normal. Pulmonary vasculature normal. Lungs clear. NO ACUTE CARDIOPULMONARY DISEASE. Us Retroperitoneal Limited    Result Date: 5/26/2020  US RETROPERITONEAL LIMITED : 5/25/2020 CLINICAL HISTORY:  LUCÍA . COMPARISON: CT abdomen pelvis 10/18/2017. TECHNIQUE: Transabdominal ultrasound of the kidneys was performed. FINDINGS: The study is mildly limited by the patient's body habitus. Both kidneys appear normal in size, position and morphology, not significantly changed from the previous CT. There is no hydronephrosis, visualized nephrolithiasis, abnormal perinephric collections, solid or cystic renal masses identified. The right kidney measures approximately 11.2 cm in length. The left kidney approximately 11 cm. Average renal cortical thickness measures approximately 1 cm bilaterally. NEGATIVE MILDLY LIMITED RENAL ULTRASOUND.         Labs:   Recent Results (from the past 72 hour(s))   POCT Glucose    Collection Time: 05/27/20 10:59 AM   Result Value Ref Range    POC Glucose 120 (H) 60 - 115 mg/dl    Performed on ACCU-CHEK    POCT Arterial    Collection Time: 05/27/20  1:25 PM   Result Value Ref Range    ACT-K 197 (H) 82 - 152 sec    Sample Type ART     Performed on SEE BELOW    POCT Glucose    Collection Time: 05/27/20  5:06 PM   Result Value Ref Range    POC Glucose 242 (H) 60 - 115 mg/dl    Performed on ACCU-CHEK    POCT Glucose    Collection Time: 05/27/20  8:48 PM   Result Value Ref Range    POC Glucose 138 (H) 60 - 115 mg/dl    Performed on ACCU-CHEK    CBC Auto Differential    Collection Time: 05/28/20  6:22 AM   Result Value Ref Range    WBC 6.6 4.8 - 10.8 K/uL    RBC 3.25 (L) 4.20 - 5.40 M/uL    Hemoglobin 9.1 (L) 12.0 - 16.0

## 2020-05-30 NOTE — PROGRESS NOTES
3  other more remote CTs of the chest going back to 4 May, 2013 (the more  remote high resolution CT chest from 14 May 2005 is not available from  the archive)     TECHNIQUE Spiral CT of the chest after the uneventful administration  of 75 mL OptiRAY 320 intravenous contrast.  Coronal reformatted images  were reviewed. FINDINGS  The 8 mm right lung fissural lymph node on axial 31 of today's exam is  unchanged. Compared to the oldest available ET of the chest 4 May,  2013.     3 mm subpleural left lower lobe nodule on axial 41 is unchanged from  the older CT as well. No new noncalcified lung nodules. No consolidation or any other evidence of active infection in the  chest.     No pleural effusion, pneumothorax or endobronchial lesion. Few nonenlarged mediastinal lymph nodes are unchanged in size, number  and distribution. No jayden adenopathy in the chest.     No acute cardiovascular findings. Included upper abdomen, soft tissues of the chest wall and thoracic  skeleton of the acute findings. CONCLUSION     NO INTERVAL CHANGE. RIGHT LUNG FISSURAL LYMPH NODE IN THE SUBPLEURAL LEFT LOWER LOBE 3 MM  NODULE ARE BOTH UNCHANGED COMPARED TO THE OLDEST AVAILABLE PERTINENT  IMAGING, CT CHEST 4 MAY 2013. NO NEW LUNG NODULES. NO ADENOPATHY IN THE CHEST. NO ACUTE UNANTICIPATED INCIDENTAL FINDINGS. Kris Watkins M.D. Released ByFrances Huffman M.D. Released Date Time- 12/16/14 1047   This document has been electronically signed. ------------------------------------------------------------------------------        WITHOUT CONTRAST: No results found for this or any previous visit.     CXR      2-view:   Results for orders placed during the hospital encounter of 10/02/19   XR CHEST STANDARD (2 VW)    Narrative EXAMINATION: Chest x-ray, 2 view    HISTORY: Shortness of breath    TECHNIQUE: Frontal and lateral views of the chest    COMPARISON: Chest x-ray from October 2, 2019    FINDINGS:     Cardiomediastinal silhouette is within normal limits. No pneumothorax, pleural effusion, or focal consolidation. Chronic cerebral compression deformity of a lower thoracic vertebral body. No acute osseous abnormality. Degenerative changes of the spine. Impression No acute intrathoracic process. XR CHEST STANDARD (2 VW)    Narrative History: Shortness of breath    COMPARISON: February 23, 2018 chest x-ray and December 16, 2014 CT chest.. FINDINGS:     Cardiac silhouette is at the upper limits of normal in size with cardiothoracic ratio of approximately 0.50, likely related to hypoinflation. Mediastinal contour unremarkable. Mild nonspecific prominence of the bronchovascular markings unchanged and   accentuated by hypoinflation. . There are no focal infiltrates, pulmonary edema or pleural effusions. Linear density within the anterior aspect of the cardiac silhouette on the lateral film is compatible with coronary vascular stents . Stable compression deformity of the T11 vertebral body since 2018 chest x-ray with approximately one third reduction in height. This was not present on the CT from December 16, 2014. Right mild to moderate and mild left acromial clavicular joint degenerative changes. Healed right-sided rib fractures unchanged. .        Impression Impression:     Stable chest x-ray since February 23, 2018. No acute infiltrates. Portable:   Results for orders placed during the hospital encounter of 05/24/20   XR CHEST PORTABLE    Narrative EXAMINATION: XR CHEST PORTABLE    CLINICAL HISTORY: CHEST PAIN, SHORTNESS OF BREATH    COMPARISONS: NOVEMBER 22, 2019    FINDINGS: Osseous structures intact. Cardiopericardial silhouette normal. Pulmonary vasculature normal. Lungs clear. Impression NO ACUTE CARDIOPULMONARY DISEASE. Echo No results found for this or any previous visit.           Assessment/Plan:    Active

## 2020-06-01 ENCOUNTER — TELEPHONE (OUTPATIENT)
Dept: FAMILY MEDICINE CLINIC | Age: 60
End: 2020-06-01

## 2020-06-01 ENCOUNTER — CARE COORDINATION (OUTPATIENT)
Dept: CASE MANAGEMENT | Age: 60
End: 2020-06-01

## 2020-06-01 PROCEDURE — 1111F DSCHRG MED/CURRENT MED MERGE: CPT | Performed by: FAMILY MEDICINE

## 2020-06-01 RX ORDER — ISOSORBIDE MONONITRATE 120 MG/1
120 TABLET, EXTENDED RELEASE ORAL DAILY
Qty: 90 TABLET | Refills: 1 | Status: ON HOLD | OUTPATIENT
Start: 2020-06-01 | End: 2020-07-02 | Stop reason: HOSPADM

## 2020-06-01 RX ORDER — CARVEDILOL 12.5 MG/1
TABLET ORAL
Qty: 180 TABLET | Refills: 1 | Status: ON HOLD | OUTPATIENT
Start: 2020-06-01 | End: 2020-07-02 | Stop reason: HOSPADM

## 2020-06-01 RX ORDER — FUROSEMIDE 40 MG/1
40 TABLET ORAL DAILY
Qty: 90 TABLET | Refills: 1 | Status: ON HOLD | OUTPATIENT
Start: 2020-06-01 | End: 2020-06-12 | Stop reason: SDUPTHER

## 2020-06-01 RX ORDER — AMLODIPINE BESYLATE 10 MG/1
10 TABLET ORAL DAILY
Qty: 90 TABLET | Refills: 1 | Status: SHIPPED | OUTPATIENT
Start: 2020-06-01 | End: 2021-05-25

## 2020-06-01 RX ORDER — PANTOPRAZOLE SODIUM 20 MG/1
20 TABLET, DELAYED RELEASE ORAL DAILY
Qty: 90 TABLET | Refills: 1 | Status: SHIPPED | OUTPATIENT
Start: 2020-06-01 | End: 2020-11-02

## 2020-06-01 NOTE — CARE COORDINATION
[facility] Transitions Initial Follow Up Call    Call within 2 business days of discharge: Yes    Patient: Mathew Sung Patient : 1960   MRN: [id number]  Reason for Admission: -2020 ED [facility] IP Angina, DHF, CKD, HTN, s/p PCI mid LAD. Discharge Date: 20 RARS: Readmission Risk Score: 26  NR    Last Discharge [facility]       Complaint Diagnosis Description Type Department Provider    20 Chest Pain Chest pain, unspecified type ED to Hosp-Admission (Discharged) (ADMITTED) Opal Medina MD           Spoke withMarimar Blankenship    Reports she had chest discomfort most of last night. She took two nitro tablets following protocol and states it was effective for relief. States she felt SOB and a little dizzy at time of event and denied any nausea,vomiting, sweating, or pain radiation at the time. Does not have a working neb machine for [facility]. PCP routed request for order. Reports this AM fasting BS was 270. Has appt w/ diabetic ed.  A1C 7.1. Denies current chest pain/pressure, palpitations, dizziness, and feels SOB is at baseline. Denies any home needs. Reports her ins denied BS monitor/meter. Enc her to discuss this at DM ed appt, v/u. Has glucometer. Facility: [facility]    PCP appt  3:30, Dr Sivan Flores  10:30, and Pall Care appt  2:00. [address] . Pt advised. Med rec/1111F order completed. Non-face-to-face services provided:  Obtained and reviewed discharge summary and/or continuity of care documents  Education of patient/family/caregiver/guardian to support self-management-Reviewed symptoms Acute MI to call 911. Reviewed CV-19 symptoms and precautions to follow.     Care Transitions 24 Hour Call    Do you have any ongoing symptoms?:  Yes  Patient-reported symptoms:  Chest Pain  Interventions for patient-reported symptoms:  Notified PCP/Physician  Do you have a copy of your discharge instructions?:  Yes  Do you have all of your

## 2020-06-02 NOTE — PROCEDURES
[address]                      [address], [address] [facility]                            CARDIAC CATHETERIZATION    PATIENT NAME: Kimo Russo                  :        1960  MED REC NO:   [id number]                            ROOM:       [id number]  ACCOUNT NO:   [de-identified]                           ADMIT DATE: 2020  PROVIDER:     Lieutenant Kojo MD    DATE OF PROCEDURE:  2020    PROCEDURE PERFORMED:  1. Coronary angiography. 2.  Hemodynamic measurement of left ventricle. 3.  Percutaneous coronary intervention of mid LAD with drug-eluting  stent. PROCEDURE DETAILS:  Following full informed consent, the patient was  brought to the cath lab where sterile prep and drape were administered  in the usual fashion. Anesthesia was obtained in the right wrist with  lidocaine after administration of conscious sedation. A right radial  5-6 slender Terumo sheath placed without complication. Heparin was  given intravenously. Nitroglycerin, nicardipine given via the sheath. A 6-Faroese XB 3.0 guide catheter was advanced and selectively engaged in  the left main coronary artery following which contrast was delivered in  the left main for cineangiography of the left system. This revealed a  lesion of mid LAD. The wire was advanced. The lesion was pre-dilated  with a balloon and stented with a drug-eluting stent and post-dilated  with noncompliant balloon at high pressure. Completion angiography was  performed and the guide and wire were removed. Right coronary artery  was not injected due to being small and nondominant. A pigtail catheter was placed in the left ventricle. Hemodynamic  measurements were made and pullback gradient was obtained. This  catheter was removed and sheath removed. Hemostasis was obtained by  manual pressure. No immediate complications. FINDINGS:  1.   LMT:  Left main trunk is angiographically normal.  Bifurcates into a  large left anterior descending and left circumflex coronary artery. 2.  LAD:  Left anterior descending artery has 90% mid in-stent  restenosis in the same area as previously it had been balloon dilated  twice. 3.  LCX:  Left circumflex has mild diffuse irregularity and is dominant,  large in caliber, reaches the inferior wall, inferolateral wall and  lateral wall. 4.  RCA:  Right coronary artery is small, nondominant, not injected. 5.  Left ventriculography:  Ejection fraction by echo is normal  recently, not injected due to contrast sparing. 6.  Hemodynamic:  Left ventricular end-diastolic pressure is 20 with no  gradient across the aortic valve. 7.  Dominance:  Left. PCI NOTE:  Successful percutaneous coronary intervention of mid-LAD 95%  stenosis with BHUMIKA-3 flow reduced to 10% residual with BHUMIKA-3 flow after  implantation of Xience 2.5 x 18 drug-eluting stent, post-dilated with a  2.5 noncompliant balloon at high pressure. CONCLUSION:  Dual antiplatelet therapy, maximal medical therapy and  increase diuresis.         Ivan Suarez MD    D: 06/02/2020 14:27:34       T: 06/02/2020 14:39:45     [email or web address]  Job#: [id number]     Doc#: [id number]      CC:

## 2020-06-05 ENCOUNTER — CARE COORDINATION (OUTPATIENT)
Dept: CASE MANAGEMENT | Age: 60
End: 2020-06-05

## 2020-06-05 ENCOUNTER — OFFICE VISIT (OUTPATIENT)
Dept: FAMILY MEDICINE CLINIC | Age: 60
End: 2020-06-05
Payer: MEDICARE

## 2020-06-05 PROCEDURE — 99496 TRANSJ CARE MGMT HIGH F2F 7D: CPT | Performed by: FAMILY MEDICINE

## 2020-06-05 PROCEDURE — 1111F DSCHRG MED/CURRENT MED MERGE: CPT | Performed by: FAMILY MEDICINE

## 2020-06-05 RX ORDER — ARIPIPRAZOLE 5 MG/1
5 TABLET ORAL DAILY
Qty: 30 TABLET | Refills: 2 | Status: SHIPPED | OUTPATIENT
Start: 2020-06-05 | End: 2020-08-28

## 2020-06-05 RX ORDER — LOPERAMIDE HYDROCHLORIDE 2 MG/1
2 CAPSULE ORAL 4 TIMES DAILY PRN
Qty: 30 CAPSULE | Refills: 0 | Status: SHIPPED | OUTPATIENT
Start: 2020-06-05 | End: 2020-06-16

## 2020-06-05 NOTE — PROGRESS NOTES
Post-Discharge Transitional Care Management Services or Hospital Follow Up      Gen Blackwell   YOB: 1960    Date of Office Visit:  6/5/2020  Date of Hospital Admission: 5/24/20  Date of Hospital Discharge: 5/30/20  Readmission Risk Score(high >=14%. Medium >=10%):Readmission Risk Score: 26      Care management risk score Rising risk (score 2-5) and Complex Care (Scores >=6): 15     Non face to face  following discharge, date last encounter closed (first attempt may have been earlier): 6/1/2020  3:32 PM 6/1/2020  3:32 PM    Call initiated 2 business days of discharge: Yes     Patient Active Problem List   Diagnosis    Coronary artery disease involving native heart with angina pectoris ([address]    Schizophrenia, paranoid, chronic    Metabolic syndrome    Obesity    Vitamin B 12 deficiency    Cerebral microvascular disease    Mixed hyperlipidemia    Other hammer toe (acquired)    Vitamin D insufficiency    Incontinence of urine    Diabetic nephropathy with proteinuria ([address]    HTN (hypertension)    History of type C viral hepatitis    Urinary incontinence due to cognitive impairment    History of seizures    Stented coronary artery-plan is to stay on Plavix indefinately per Dr Elizabeth Melendez    Hemiparesis, left [facility])    Angina, class II ([address]    CKD (chronic kidney disease) stage 4, GFR 15-29 ml/min (HCC)    Chronic painful diabetic neuropathy (HCC)    Tardive dyskinesia    Shortness of breath    Uncontrolled type 2 diabetes mellitus with hyperglycemia ([address]    Diastolic CHF ([address]    Sleep apnea    Pulmonary hypertension ([address]    Encephalopathy    Obesity (BMI 30-39. 9)    Edema    Closed supracondylar fracture of right humerus    Other chronic pain    Palliative care patient    Chest pain       Allergies   Allergen Reactions    Codeine Hives     hives       Medications listed as ordered at the time of discharge from [facility], [address] [place] [address] Medication

## 2020-06-05 NOTE — TELEPHONE ENCOUNTER
Rx request   Requested Prescriptions     Pending Prescriptions Disp Refills    ARIPiprazole (ABILIFY) 5 MG tablet 30 tablet 0    Magnesium Oxide -Mg Supplement 400 MG CAPS 30 capsule 11     LOV 8/13/2019  Next Visit Date:  Future Appointments   Date Time Provider Aminata Cortes   6/5/2020  3:30 PM Derwood Castleman, MD [facility]   6/8/2020  2:00 PM Argelia Yanes APRN - CNP [place] [address] [address] [facility]   6/17/2020 10:30 AM Yolis White MD [facility]   6/22/2020 11:00 AM Solomon Lesches, [address] [place] [address]   8/20/2020 11:30 AM Derwood Castleman, MD [address] [place] [address]

## 2020-06-10 ENCOUNTER — TELEPHONE (OUTPATIENT)
Dept: OTHER | Facility: CLINIC | Age: 60
End: 2020-06-10

## 2020-06-10 ENCOUNTER — HOSPITAL ENCOUNTER (OUTPATIENT)
Age: 60
Setting detail: OBSERVATION
Discharge: HOME | End: 2020-06-12
Attending: EMERGENCY MEDICINE | Admitting: INTERNAL MEDICINE
Payer: MEDICARE

## 2020-06-10 ENCOUNTER — APPOINTMENT (OUTPATIENT)
Dept: GENERAL RADIOLOGY | Age: 60
End: 2020-06-10
Payer: MEDICARE

## 2020-06-10 LAB
ALBUMIN SERPL-MCNC: 3.6 G/DL (ref 3.5–4.6)
ALP BLD-CCNC: 101 U/L (ref 40–130)
ALT SERPL-CCNC: 23 U/L (ref 0–33)
ANION GAP SERPL CALCULATED.3IONS-SCNC: 14 MEQ/L (ref 9–15)
APTT: 30.8 SEC (ref 24.4–36.8)
AST SERPL-CCNC: 26 U/L (ref 0–35)
BASOPHILS ABSOLUTE: 0 K/UL (ref 0–0.2)
BASOPHILS RELATIVE PERCENT: 0.5 %
BILIRUB SERPL-MCNC: 0.3 MG/DL (ref 0.2–0.7)
BUN BLDV-MCNC: 88 MG/DL (ref 8–23)
CALCIUM SERPL-MCNC: 9.5 MG/DL (ref 8.5–9.9)
CHLORIDE BLD-SCNC: 95 MEQ/L (ref 95–107)
CO2: 18 MEQ/L (ref 20–31)
CREAT SERPL-MCNC: 3.16 MG/DL (ref 0.5–0.9)
EOSINOPHILS ABSOLUTE: 0.3 K/UL (ref 0–0.7)
EOSINOPHILS RELATIVE PERCENT: 3.1 %
GFR AFRICAN AMERICAN: 18
GFR NON-AFRICAN AMERICAN: 14.9
GLOBULIN: 3.2 G/DL (ref 2.3–3.5)
GLUCOSE BLD-MCNC: 133 MG/DL (ref 70–99)
GLUCOSE BLD-MCNC: 190 MG/DL (ref 60–115)
HCT VFR BLD CALC: 26.5 % (ref 37–47)
HEMOGLOBIN: 9.1 G/DL (ref 12–16)
INR BLD: 1.2
LYMPHOCYTES ABSOLUTE: 1.8 K/UL (ref 1–4.8)
LYMPHOCYTES RELATIVE PERCENT: 20.4 %
MCH RBC QN AUTO: 28.4 PG (ref 27–31.3)
MCHC RBC AUTO-ENTMCNC: 34.1 % (ref 33–37)
MCV RBC AUTO: 83.2 FL (ref 82–100)
MONOCYTES ABSOLUTE: 0.9 K/UL (ref 0.2–0.8)
MONOCYTES RELATIVE PERCENT: 10 %
NEUTROPHILS ABSOLUTE: 5.8 K/UL (ref 1.4–6.5)
NEUTROPHILS RELATIVE PERCENT: 66 %
PDW BLD-RTO: 14.1 % (ref 11.5–14.5)
PERFORMED ON: ABNORMAL
PLATELET # BLD: 158 K/UL (ref 130–400)
PLATELET AGGREGATION P2Y12: 334 PRU (ref 18–435)
PLATELET AGGREGATION: 399 ARU (ref 350–549)
POTASSIUM REFLEX MAGNESIUM: 3.9 MEQ/L (ref 3.4–4.9)
PRO-BNP: 5425 PG/ML
PROTHROMBIN TIME: 15.2 SEC (ref 12.3–14.9)
RBC # BLD: 3.19 M/UL (ref 4.2–5.4)
SODIUM BLD-SCNC: 127 MEQ/L (ref 135–144)
TOTAL PROTEIN: 6.8 G/DL (ref 6.3–8)
TROPONIN: 0.03 NG/ML (ref 0–0.01)
WBC # BLD: 8.8 K/UL (ref 4.8–10.8)

## 2020-06-10 PROCEDURE — 71045 X-RAY EXAM CHEST 1 VIEW: CPT

## 2020-06-10 PROCEDURE — 80053 COMPREHEN METABOLIC PANEL: CPT

## 2020-06-10 PROCEDURE — 99285 EMERGENCY DEPT VISIT HI MDM: CPT

## 2020-06-10 PROCEDURE — 84484 ASSAY OF TROPONIN QUANT: CPT

## 2020-06-10 PROCEDURE — 36415 COLL VENOUS BLD VENIPUNCTURE: CPT

## 2020-06-10 PROCEDURE — G0378 HOSPITAL OBSERVATION PER HR: HCPCS

## 2020-06-10 PROCEDURE — 85025 COMPLETE CBC W/AUTO DIFF WBC: CPT

## 2020-06-10 PROCEDURE — 93005 ELECTROCARDIOGRAM TRACING: CPT | Performed by: EMERGENCY MEDICINE

## 2020-06-10 PROCEDURE — 85730 THROMBOPLASTIN TIME PARTIAL: CPT

## 2020-06-10 PROCEDURE — 6370000000 HC RX 637 (ALT 250 FOR IP): Performed by: EMERGENCY MEDICINE

## 2020-06-10 PROCEDURE — 85610 PROTHROMBIN TIME: CPT

## 2020-06-10 PROCEDURE — 85576 BLOOD PLATELET AGGREGATION: CPT

## 2020-06-10 PROCEDURE — 83880 ASSAY OF NATRIURETIC PEPTIDE: CPT

## 2020-06-10 RX ORDER — ASPIRIN 81 MG/1
324 TABLET, CHEWABLE ORAL ONCE
Status: DISCONTINUED | OUTPATIENT
Start: 2020-06-10 | End: 2020-06-10

## 2020-06-10 RX ORDER — POTASSIUM CHLORIDE 7.45 MG/ML
10 INJECTION INTRAVENOUS PRN
Status: DISCONTINUED | OUTPATIENT
Start: 2020-06-10 | End: 2020-06-12 | Stop reason: HOSPADM

## 2020-06-10 RX ORDER — ATORVASTATIN CALCIUM 80 MG/1
80 TABLET, FILM COATED ORAL NIGHTLY
Status: DISCONTINUED | OUTPATIENT
Start: 2020-06-11 | End: 2020-06-12 | Stop reason: HOSPADM

## 2020-06-10 RX ORDER — TRAMADOL HYDROCHLORIDE 50 MG/1
50 TABLET ORAL ONCE
Status: COMPLETED | OUTPATIENT
Start: 2020-06-10 | End: 2020-06-10

## 2020-06-10 RX ORDER — POTASSIUM CHLORIDE 20 MEQ/1
40 TABLET, EXTENDED RELEASE ORAL PRN
Status: DISCONTINUED | OUTPATIENT
Start: 2020-06-10 | End: 2020-06-12 | Stop reason: HOSPADM

## 2020-06-10 RX ORDER — NITROGLYCERIN 0.4 MG/1
0.4 TABLET SUBLINGUAL EVERY 5 MIN PRN
Status: DISCONTINUED | OUTPATIENT
Start: 2020-06-10 | End: 2020-06-11 | Stop reason: SDUPTHER

## 2020-06-10 RX ORDER — ASPIRIN 81 MG/1
81 TABLET ORAL DAILY
Status: DISCONTINUED | OUTPATIENT
Start: 2020-06-11 | End: 2020-06-12 | Stop reason: HOSPADM

## 2020-06-10 RX ADMIN — TRAMADOL HYDROCHLORIDE 50 MG: 50 TABLET, FILM COATED ORAL at 17:58

## 2020-06-10 RX ADMIN — NITROGLYCERIN 1 INCH: 20 OINTMENT TOPICAL at 20:17

## 2020-06-10 ASSESSMENT — PAIN DESCRIPTION - FREQUENCY
FREQUENCY: CONTINUOUS
FREQUENCY: CONTINUOUS

## 2020-06-10 ASSESSMENT — PAIN DESCRIPTION - PAIN TYPE
TYPE: ACUTE PAIN
TYPE: ACUTE PAIN

## 2020-06-10 ASSESSMENT — PAIN DESCRIPTION - LOCATION
LOCATION: CHEST
LOCATION: CHEST

## 2020-06-10 ASSESSMENT — PAIN SCALES - GENERAL
PAINLEVEL_OUTOF10: 8
PAINLEVEL_OUTOF10: 0
PAINLEVEL_OUTOF10: 6
PAINLEVEL_OUTOF10: 0
PAINLEVEL_OUTOF10: 8

## 2020-06-10 ASSESSMENT — PAIN DESCRIPTION - DESCRIPTORS
DESCRIPTORS: SHARP
DESCRIPTORS: SHARP

## 2020-06-10 NOTE — ED PROVIDER NOTES
(Left, 6/21/2018); and Diagnostic Cardiac Cath Lab Procedure (10/02/2019). Social History:  reports that she is a non-smoker but has been exposed to tobacco smoke. She has never used smokeless tobacco. She reports that she does not drink alcohol or use drugs. Family History: family history includes Cancer (age of onset: 76) in her mother; Diabetes in her sister; Hypertension in her father; Mental Illness in her sister. The patients home medications have been reviewed. Allergies: Codeine    ---------------------------------------------------PHYSICAL EXAM--------------------------------------     Constitutional/General: Alert and oriented x3, well appearing, non toxic in NAD  Head: Normocephalic and atraumatic  Eyes: PERRL, EOMI  Mouth: Oropharynx clear, handling secretions, no trismus  Neck: Supple, full ROM, non tender to palpation in the midline, no stridor, no crepitus, no meningeal signs  Pulmonary: Lungs clear to auscultation bilaterally, no wheezes, rales, or rhonchi. Not in respiratory distress  Cardiovascular:  Regular rate. Regular rhythm. No murmurs, gallops, or rubs. 2+ distal pulses  Chest: No chest wall tenderness  Abdomen: Soft. Non tender. Non distended. +BS. No rebound, guarding, or rigidity. No pulsatile masses appreciated. Musculoskeletal: Moves all extremities x 4. Warm and well perfused, no clubbing, cyanosis, or edema. Capillary refill <3 seconds  Skin: warm and dry. No rashes. Neurologic: GCS 15, CN 2-12 grossly intact, no focal deficits, symmetric strength 5/5 in the upper and lower extremities bilaterally  Psych: Normal Affect    -------------------------------------------------- RESULTS -------------------------------------------------  I have personally reviewed all laboratory and imaging results for this patient. Results are listed below.      LABS:  Results for orders placed or performed during the hospital encounter of 06/10/20   CBC Auto Differential   Result Value Ref Interpretation  Interpreted by emergency department physician    Rhythm: normal sinus  and 1 degree AV block  Rate: normal  Axis: left  Conduction: normal  ST Segments: no acute change  T Waves: no acute change    Clinical Impression: no acute changes, non-specific EKG and 1st degree AV block  Comparison to prior EKG: stable as compared to patient's most recent EKG      ------------------------- NURSING NOTES AND VITALS REVIEWED ---------------------------   The nursing notes within the ED encounter and vital signs as below have been reviewed by myself. BP (!) 148/62   Pulse 68   Temp 98.2 °F (36.8 °C) (Oral)   Resp 18   Ht 5' 7\" (1.702 m)   Wt 231 lb (104.8 kg)   LMP  (LMP Unknown)   SpO2 97%   BMI 36.18 kg/m²   Oxygen Saturation Interpretation: Normal    The patients available past medical records and past encounters were reviewed. ------------------------------ ED COURSE/MEDICAL DECISION MAKING----------------------  Medications   nitroglycerin (NITRO-BID) 2 % ointment 1 inch (has no administration in time range)   traMADol (ULTRAM) tablet 50 mg (50 mg Oral Given 6/10/20 1758)             Medical Decision Making:    Patient has reproducible chest wall pain EKG and lab work are unremarkable she was given aspirin nitroglycerin paste with complete resolution of her symptoms. She has chronic renal insufficiency. Her troponin was elevated on this visit. However on prior visits her troponins have all been negative in light of renal insufficiency. She has restenosed twice in the past year    Re-Evaluations:             Re-evaluation. Patients symptoms are improving      Consultations:             Phone consult placed to the hospitalist, and he will admit the patient.   We will obtain PA GG studies to see if she has been taking her antiplatelet therapy          This patient's ED course included: re-evaluation prior to disposition and a personal history and physicial eaxmination    This patient has remained hemodynamically stable during their ED course. Counseling: The emergency provider has spoken with the patient and discussed todays results, in addition to providing specific details for the plan of care and counseling regarding the diagnosis and prognosis. Questions are answered at this time and they are agreeable with the plan.       --------------------------------- IMPRESSION AND DISPOSITION ---------------------------------    IMPRESSION  1. Chest pain, unspecified type    2. Elevated troponin        DISPOSITION  Disposition: Admit to telemetry  Patient condition is good        NOTE: This report was transcribed using voice recognition software.  Every effort was made to ensure accuracy; however, inadvertent computerized transcription errors may be present          Kemi Rodriguez MD  06/10/20 2014

## 2020-06-10 NOTE — ED NOTES
Pt resting in bed, states pain is slightly better,denies SOB at this time, given food, call light within reach, denies any other needs at this time.      Celestino Pinto RN  06/10/20 6615

## 2020-06-10 NOTE — TELEPHONE ENCOUNTER
Writer contacted Dr. Penni Goodpasture via text,   ED provider to inform of 30 day readmission risk.

## 2020-06-11 LAB
ALBUMIN SERPL-MCNC: 3.7 G/DL (ref 3.5–4.6)
ALP BLD-CCNC: 112 U/L (ref 40–130)
ALT SERPL-CCNC: 30 U/L (ref 0–33)
ANION GAP SERPL CALCULATED.3IONS-SCNC: 15 MEQ/L (ref 9–15)
AST SERPL-CCNC: 30 U/L (ref 0–35)
BILIRUB SERPL-MCNC: 0.4 MG/DL (ref 0.2–0.7)
BUN BLDV-MCNC: 89 MG/DL (ref 8–23)
CALCIUM SERPL-MCNC: 9.8 MG/DL (ref 8.5–9.9)
CHLORIDE BLD-SCNC: 102 MEQ/L (ref 95–107)
CHOLESTEROL, TOTAL: 101 MG/DL (ref 0–199)
CO2: 17 MEQ/L (ref 20–31)
CREAT SERPL-MCNC: 3.28 MG/DL (ref 0.5–0.9)
EKG ATRIAL RATE: 62 BPM
EKG ATRIAL RATE: 65 BPM
EKG P AXIS: 43 DEGREES
EKG P AXIS: 65 DEGREES
EKG P-R INTERVAL: 278 MS
EKG P-R INTERVAL: 290 MS
EKG Q-T INTERVAL: 412 MS
EKG Q-T INTERVAL: 418 MS
EKG QRS DURATION: 104 MS
EKG QRS DURATION: 108 MS
EKG QTC CALCULATION (BAZETT): 418 MS
EKG QTC CALCULATION (BAZETT): 434 MS
EKG R AXIS: -54 DEGREES
EKG R AXIS: -62 DEGREES
EKG T AXIS: 21 DEGREES
EKG T AXIS: 32 DEGREES
EKG VENTRICULAR RATE: 62 BPM
EKG VENTRICULAR RATE: 65 BPM
GFR AFRICAN AMERICAN: 17.2
GFR NON-AFRICAN AMERICAN: 14.2
GLOBULIN: 3 G/DL (ref 2.3–3.5)
GLUCOSE BLD-MCNC: 130 MG/DL (ref 60–115)
GLUCOSE BLD-MCNC: 150 MG/DL (ref 60–115)
GLUCOSE BLD-MCNC: 236 MG/DL (ref 70–99)
GLUCOSE BLD-MCNC: 486 MG/DL (ref 60–115)
HBA1C MFR BLD: 6.8 % (ref 4.8–5.9)
HCT VFR BLD CALC: 27.2 % (ref 37–47)
HDLC SERPL-MCNC: 48 MG/DL (ref 40–59)
HEMOGLOBIN: 9.3 G/DL (ref 12–16)
LDL CHOLESTEROL CALCULATED: 37 MG/DL (ref 0–129)
MCH RBC QN AUTO: 28.3 PG (ref 27–31.3)
MCHC RBC AUTO-ENTMCNC: 34.3 % (ref 33–37)
MCV RBC AUTO: 82.4 FL (ref 82–100)
PDW BLD-RTO: 14.2 % (ref 11.5–14.5)
PERFORMED ON: ABNORMAL
PLATELET # BLD: 153 K/UL (ref 130–400)
POTASSIUM REFLEX MAGNESIUM: 3.8 MEQ/L (ref 3.4–4.9)
RBC # BLD: 3.3 M/UL (ref 4.2–5.4)
SODIUM BLD-SCNC: 134 MEQ/L (ref 135–144)
TOTAL PROTEIN: 6.7 G/DL (ref 6.3–8)
TRIGL SERPL-MCNC: 82 MG/DL (ref 0–150)
TROPONIN: 0.02 NG/ML (ref 0–0.01)
TROPONIN: 0.02 NG/ML (ref 0–0.01)
WBC # BLD: 8.1 K/UL (ref 4.8–10.8)

## 2020-06-11 PROCEDURE — 36415 COLL VENOUS BLD VENIPUNCTURE: CPT

## 2020-06-11 PROCEDURE — 84484 ASSAY OF TROPONIN QUANT: CPT

## 2020-06-11 PROCEDURE — 6370000000 HC RX 637 (ALT 250 FOR IP): Performed by: INTERNAL MEDICINE

## 2020-06-11 PROCEDURE — 96372 THER/PROPH/DIAG INJ SC/IM: CPT

## 2020-06-11 PROCEDURE — 80061 LIPID PANEL: CPT

## 2020-06-11 PROCEDURE — G0378 HOSPITAL OBSERVATION PER HR: HCPCS

## 2020-06-11 PROCEDURE — 93010 ELECTROCARDIOGRAM REPORT: CPT | Performed by: INTERNAL MEDICINE

## 2020-06-11 PROCEDURE — 6360000002 HC RX W HCPCS: Performed by: INTERNAL MEDICINE

## 2020-06-11 PROCEDURE — 85027 COMPLETE CBC AUTOMATED: CPT

## 2020-06-11 PROCEDURE — 93005 ELECTROCARDIOGRAM TRACING: CPT | Performed by: INTERNAL MEDICINE

## 2020-06-11 PROCEDURE — 94664 DEMO&/EVAL PT USE INHALER: CPT

## 2020-06-11 PROCEDURE — 99215 OFFICE O/P EST HI 40 MIN: CPT | Performed by: INTERNAL MEDICINE

## 2020-06-11 PROCEDURE — 83036 HEMOGLOBIN GLYCOSYLATED A1C: CPT

## 2020-06-11 PROCEDURE — 80053 COMPREHEN METABOLIC PANEL: CPT

## 2020-06-11 RX ORDER — ALBUTEROL SULFATE 2.5 MG/3ML
2.5 SOLUTION RESPIRATORY (INHALATION) EVERY 6 HOURS PRN
Status: DISCONTINUED | OUTPATIENT
Start: 2020-06-11 | End: 2020-06-12 | Stop reason: HOSPADM

## 2020-06-11 RX ORDER — ISOSORBIDE MONONITRATE 120 MG/1
120 TABLET, EXTENDED RELEASE ORAL DAILY
Status: DISCONTINUED | OUTPATIENT
Start: 2020-06-11 | End: 2020-06-12 | Stop reason: HOSPADM

## 2020-06-11 RX ORDER — ATORVASTATIN CALCIUM 40 MG/1
40 TABLET, FILM COATED ORAL DAILY
Status: DISCONTINUED | OUTPATIENT
Start: 2020-06-11 | End: 2020-06-11

## 2020-06-11 RX ORDER — LOPERAMIDE HYDROCHLORIDE 2 MG/1
2 CAPSULE ORAL 4 TIMES DAILY PRN
Status: DISCONTINUED | OUTPATIENT
Start: 2020-06-11 | End: 2020-06-12 | Stop reason: HOSPADM

## 2020-06-11 RX ORDER — CARVEDILOL 12.5 MG/1
12.5 TABLET ORAL 3 TIMES DAILY
Status: DISCONTINUED | OUTPATIENT
Start: 2020-06-11 | End: 2020-06-12 | Stop reason: HOSPADM

## 2020-06-11 RX ORDER — LANOLIN ALCOHOL/MO/W.PET/CERES
400 CREAM (GRAM) TOPICAL DAILY
Status: DISCONTINUED | OUTPATIENT
Start: 2020-06-11 | End: 2020-06-12 | Stop reason: HOSPADM

## 2020-06-11 RX ORDER — AMLODIPINE BESYLATE 10 MG/1
10 TABLET ORAL DAILY
Status: DISCONTINUED | OUTPATIENT
Start: 2020-06-11 | End: 2020-06-12 | Stop reason: HOSPADM

## 2020-06-11 RX ORDER — CLOPIDOGREL BISULFATE 75 MG/1
75 TABLET ORAL DAILY
Status: DISCONTINUED | OUTPATIENT
Start: 2020-06-11 | End: 2020-06-11

## 2020-06-11 RX ORDER — NITROGLYCERIN 0.4 MG/1
0.4 TABLET SUBLINGUAL EVERY 5 MIN PRN
Status: DISCONTINUED | OUTPATIENT
Start: 2020-06-11 | End: 2020-06-12 | Stop reason: HOSPADM

## 2020-06-11 RX ORDER — DEXTROSE MONOHYDRATE 50 MG/ML
100 INJECTION, SOLUTION INTRAVENOUS PRN
Status: DISCONTINUED | OUTPATIENT
Start: 2020-06-11 | End: 2020-06-12 | Stop reason: HOSPADM

## 2020-06-11 RX ORDER — ARIPIPRAZOLE 5 MG/1
5 TABLET ORAL DAILY
Status: DISCONTINUED | OUTPATIENT
Start: 2020-06-11 | End: 2020-06-12 | Stop reason: HOSPADM

## 2020-06-11 RX ORDER — INSULIN GLARGINE 100 [IU]/ML
35 INJECTION, SOLUTION SUBCUTANEOUS NIGHTLY
Status: DISCONTINUED | OUTPATIENT
Start: 2020-06-11 | End: 2020-06-12 | Stop reason: HOSPADM

## 2020-06-11 RX ORDER — NICOTINE POLACRILEX 4 MG
15 LOZENGE BUCCAL PRN
Status: DISCONTINUED | OUTPATIENT
Start: 2020-06-11 | End: 2020-06-12 | Stop reason: HOSPADM

## 2020-06-11 RX ORDER — RANOLAZINE 500 MG/1
500 TABLET, EXTENDED RELEASE ORAL 2 TIMES DAILY
Status: DISCONTINUED | OUTPATIENT
Start: 2020-06-11 | End: 2020-06-12 | Stop reason: HOSPADM

## 2020-06-11 RX ORDER — UBIDECARENONE 75 MG
100 CAPSULE ORAL DAILY
Status: DISCONTINUED | OUTPATIENT
Start: 2020-06-11 | End: 2020-06-12 | Stop reason: HOSPADM

## 2020-06-11 RX ORDER — PREGABALIN 75 MG/1
75 CAPSULE ORAL DAILY
Status: DISCONTINUED | OUTPATIENT
Start: 2020-06-11 | End: 2020-06-12 | Stop reason: HOSPADM

## 2020-06-11 RX ORDER — LANOLIN ALCOHOL/MO/W.PET/CERES
3 CREAM (GRAM) TOPICAL NIGHTLY PRN
Status: DISCONTINUED | OUTPATIENT
Start: 2020-06-11 | End: 2020-06-12 | Stop reason: HOSPADM

## 2020-06-11 RX ORDER — PANTOPRAZOLE SODIUM 20 MG/1
20 TABLET, DELAYED RELEASE ORAL DAILY
Status: DISCONTINUED | OUTPATIENT
Start: 2020-06-11 | End: 2020-06-12 | Stop reason: HOSPADM

## 2020-06-11 RX ORDER — CARVEDILOL 12.5 MG/1
12.5 TABLET ORAL 2 TIMES DAILY WITH MEALS
Status: DISCONTINUED | OUTPATIENT
Start: 2020-06-11 | End: 2020-06-11

## 2020-06-11 RX ORDER — ACETAMINOPHEN 325 MG/1
650 TABLET ORAL EVERY 6 HOURS PRN
Status: DISCONTINUED | OUTPATIENT
Start: 2020-06-11 | End: 2020-06-12 | Stop reason: HOSPADM

## 2020-06-11 RX ORDER — DEXTROSE MONOHYDRATE 25 G/50ML
12.5 INJECTION, SOLUTION INTRAVENOUS PRN
Status: DISCONTINUED | OUTPATIENT
Start: 2020-06-11 | End: 2020-06-12 | Stop reason: HOSPADM

## 2020-06-11 RX ADMIN — ACETAMINOPHEN 650 MG: 325 TABLET, FILM COATED ORAL at 15:24

## 2020-06-11 RX ADMIN — INSULIN GLARGINE 35 UNITS: 100 INJECTION, SOLUTION SUBCUTANEOUS at 20:46

## 2020-06-11 RX ADMIN — ARIPIPRAZOLE 5 MG: 5 TABLET ORAL at 08:51

## 2020-06-11 RX ADMIN — Medication 400 MG: at 08:51

## 2020-06-11 RX ADMIN — SERTRALINE HYDROCHLORIDE 50 MG: 50 TABLET, FILM COATED ORAL at 08:52

## 2020-06-11 RX ADMIN — ATORVASTATIN CALCIUM 80 MG: 80 TABLET, FILM COATED ORAL at 20:36

## 2020-06-11 RX ADMIN — NITROGLYCERIN 0.4 MG: 0.4 TABLET, ORALLY DISINTEGRATING SUBLINGUAL at 22:09

## 2020-06-11 RX ADMIN — INSULIN LISPRO 12 UNITS: 100 INJECTION, SOLUTION INTRAVENOUS; SUBCUTANEOUS at 16:42

## 2020-06-11 RX ADMIN — RANOLAZINE 500 MG: 500 TABLET, FILM COATED, EXTENDED RELEASE ORAL at 12:06

## 2020-06-11 RX ADMIN — AMLODIPINE BESYLATE 10 MG: 10 TABLET ORAL at 08:52

## 2020-06-11 RX ADMIN — ENOXAPARIN SODIUM 30 MG: 30 INJECTION SUBCUTANEOUS at 08:58

## 2020-06-11 RX ADMIN — ASPIRIN 81 MG: 81 TABLET, COATED ORAL at 08:51

## 2020-06-11 RX ADMIN — INSULIN LISPRO 12 UNITS: 100 INJECTION, SOLUTION INTRAVENOUS; SUBCUTANEOUS at 08:59

## 2020-06-11 RX ADMIN — TICAGRELOR 90 MG: 90 TABLET ORAL at 08:52

## 2020-06-11 RX ADMIN — PREGABALIN 75 MG: 75 CAPSULE ORAL at 08:52

## 2020-06-11 RX ADMIN — ISOSORBIDE MONONITRATE 120 MG: 120 TABLET ORAL at 08:58

## 2020-06-11 RX ADMIN — VITAM B12 100 MCG: 100 TAB at 12:54

## 2020-06-11 RX ADMIN — MELATONIN 3 MG: at 22:35

## 2020-06-11 RX ADMIN — INSULIN LISPRO 12 UNITS: 100 INJECTION, SOLUTION INTRAVENOUS; SUBCUTANEOUS at 11:44

## 2020-06-11 RX ADMIN — CARVEDILOL 12.5 MG: 12.5 TABLET, FILM COATED ORAL at 08:51

## 2020-06-11 RX ADMIN — RANOLAZINE 500 MG: 500 TABLET, FILM COATED, EXTENDED RELEASE ORAL at 20:36

## 2020-06-11 RX ADMIN — TICAGRELOR 90 MG: 90 TABLET ORAL at 20:36

## 2020-06-11 RX ADMIN — PANTOPRAZOLE SODIUM 20 MG: 20 TABLET, DELAYED RELEASE ORAL at 08:52

## 2020-06-11 ASSESSMENT — ENCOUNTER SYMPTOMS
BLOOD IN STOOL: 0
RESPIRATORY NEGATIVE: 1
WHEEZING: 0
SHORTNESS OF BREATH: 0
EYES NEGATIVE: 1
GASTROINTESTINAL NEGATIVE: 1
CHEST TIGHTNESS: 0
NAUSEA: 0
COUGH: 0
STRIDOR: 0

## 2020-06-11 ASSESSMENT — PAIN DESCRIPTION - ORIENTATION: ORIENTATION: LEFT;RIGHT

## 2020-06-11 ASSESSMENT — PAIN DESCRIPTION - PROGRESSION: CLINICAL_PROGRESSION: GRADUALLY WORSENING

## 2020-06-11 ASSESSMENT — PAIN SCALES - GENERAL
PAINLEVEL_OUTOF10: 6
PAINLEVEL_OUTOF10: 4
PAINLEVEL_OUTOF10: 0

## 2020-06-11 ASSESSMENT — PAIN DESCRIPTION - LOCATION: LOCATION: LEG

## 2020-06-11 NOTE — CONSULTS
Inpatient consult to Cardiology  Consult performed by: Joe Decker MD  Consult ordered by: Meera Colon DO          Patient Name: Letha Gray Date: 6/10/2020  5:16 PM  MR #: [id number]  : 1960    Attending Physician: Shirley Shah DO  Reason for consult: CP    History of Presenting Illness:      Sebastian Onofre is a 56 y.o. female on hospital day 0 with a history of . History Obtained From:  patient, electronic medical record    Presents to ER with persisitent CP. CP is bothering her most hours of the day. It is pressure with no radiation but causes diaphoresis and dyspnea. States it is like he  CP that lead to PCI. She recently had Redo Mid LAD on 2020 with sub optimal results. Her initial Mid LAD stent was never fully deployed and has been a culprit for repeat interventions to the segment. LVEF is normal. No Arrhythmia. She still has CP this am. She has advanced CKD. History:      EKG: SR 70s  Past Medical History:   Diagnosis Date    Anxiety     CAD S/P percutaneous coronary angioplasty ,     stent per dr Pari Rodrigez CHF (congestive heart failure) ([address]     Diabetic nephropathy with proteinuria ([address]     DJD (degenerative joint disease) of knee     Dr Radha Moreno GERD (gastroesophageal reflux disease)     Hemiparesis, left ([address] 2013    entered Assisted Living ([facility])    History of seizures     History of type C viral hepatitis     HTN (hypertension)     Hyperlipidemia     Impaired mobility and activities of daily living     Mediastinal lymphadenopathy 2013    Natalie Hoyt    Metabolic syndrome     Neurogenic urinary incontinence 2013    Neuropathy in diabetes ([address]     Obesity (BMI 30-39. 9)     Recurrent UTI     S/P colonoscopy 2014    [facility], focal active colitis    Schizophrenia, paranoid, chronic ([address]     [facility]   [employer] vessel disease, cerebrovascular 2013    Status post total knee replacement, right     taking: Reported on 6/1/2020)  nitrofurantoin (MACRODANTIN) 50 MG capsule, Take 50 mg by mouth daily  insulin lispro (HUMALOG) 100 UNIT/ML injection vial, Inject 12 Units into the skin 3 times daily (before meals)  LANTUS SOLOSTAR 100 UNIT/ML injection pen, Inject 35 units at night  aspirin 81 MG tablet, Take 1 tablet by mouth daily  atorvastatin (LIPITOR) 40 MG tablet, Take 1 tablet by mouth daily  BASAGLAR KWIKPEN 100 UNIT/ML injection pen, Inject 35 units nightly  sertraline (ZOLOFT) 50 MG tablet, TAKE (1) TABLET BY MOUTH DAILY  SURE COMFORT PEN NEEDLES 30G X 8 MM MISC, USE AS DIRECTED FIVE TIMES A DAY  Continuous Blood Gluc  (DEXCOM G6 ) CORETTA, 1 Device by Does not apply route continuous  Continuous Blood Gluc Sensor (DEXCOM G6 SENSOR) MISC, 1 Device by Does not apply route continuous  Continuous Blood Gluc Transmit (DEXCOM G6 TRANSMITTER) MISC, 1 Device by Does not apply route continuous  melatonin 3 MG TABS tablet, TAKE 1 TABLET BY MOUTH EVERY NIGHT AS NEEDED FOR INSOMNIA  nitroGLYCERIN (NITROSTAT) 0.4 MG SL tablet, Place 1 tablet under the tongue every 5 minutes as needed for Chest pain  clopidogrel (PLAVIX) 75 MG tablet, Take 75 mg by mouth daily  magnesium oxide (MAG-OX) 400 MG tablet, Take 400 mg by mouth daily  vitamin B-12 (CYANOCOBALAMIN) 100 MCG tablet, TAKE 1 TABLET BY MOUTH DAILY  D3-1000 1000 units CAPS, TAKE 1 CAPSULE BY MOUTH ONCE DAILY  pregabalin (LYRICA) 75 MG capsule, Give one capsule po bid    Current Hospital Medications:     Scheduled Meds:   vitamin B-12  100 mcg Oral Daily    magnesium oxide  400 mg Oral Daily    insulin lispro  12 Units Subcutaneous TID AC    isosorbide mononitrate  120 mg Oral Daily    pantoprazole  20 mg Oral Daily    amLODIPine  10 mg Oral Daily    ARIPiprazole  5 mg Oral Daily    insulin glargine  35 Units Subcutaneous Nightly    pregabalin  75 mg Oral Daily    sertraline  50 mg Oral Daily    ticagrelor  90 mg Oral BID    insulin lispro 0-6 Units Subcutaneous TID     insulin lispro  0-3 Units Subcutaneous Nightly    ranolazine  500 mg Oral BID    carvedilol  12.5 mg Oral TID    atorvastatin  80 mg Oral Nightly    enoxaparin  30 mg Subcutaneous Daily    aspirin  81 mg Oral Daily     Continuous Infusions:   dextrose       PRN Meds:.albuterol, loperamide, nitroGLYCERIN, glucose, dextrose, glucagon (rDNA), dextrose, potassium chloride **OR** potassium alternative oral replacement **OR** potassium chloride  .  dextrose          Allergies: Allergies   Allergen Reactions    Codeine Hives     hives        Review of Systems:       Review of Systems   Constitutional: Negative. Negative for diaphoresis and fatigue. HENT: Negative. Eyes: Negative. Respiratory: Negative. Negative for cough, chest tightness, shortness of breath, wheezing and stridor. Cardiovascular: Positive for chest pain. Negative for palpitations and leg swelling. Gastrointestinal: Negative. Negative for blood in stool and nausea. Genitourinary: Negative. Musculoskeletal: Negative. Skin: Negative. Neurological: Negative. Negative for dizziness, syncope, weakness and light-headedness. Hematological: Negative. Psychiatric/Behavioral: Negative. Objective Findings:     Vitals:BP (!) 140/69   Pulse 68   Temp 98.2 °F (36.8 °C) (Oral)   Resp 18   Ht 5' 7\" (1.702 m)   Wt 231 lb 1.6 oz (104.8 kg)   LMP  (LMP Unknown)   SpO2 98%   Breastfeeding No   BMI 36.20 kg/m²      Physical Examination:    Physical Exam   Constitutional: She appears healthy. No distress. HENT:   Normal cephalic and Atraumatic   Eyes: Pupils are equal, round, and reactive to light. Neck: Normal range of motion and thyroid normal. Neck supple. No JVD present. No neck adenopathy. No thyromegaly present. Cardiovascular: Normal rate, regular rhythm, normal heart sounds, intact distal pulses and normal pulses.    Pulmonary/Chest: Effort normal and breath sounds normal. She has no wheezes. She has no rales. She exhibits no tenderness. Abdominal: Soft. Bowel sounds are normal. There is no abdominal tenderness. Musculoskeletal: Normal range of motion. General: No tenderness or edema. Neurological: She is alert and oriented to person, place, and time. Skin: Skin is warm. No cyanosis. Nails show no clubbing. Results/ Medications reviewed 6/11/2020, 8:57 AM     Laboratory, Microbiology, Pathology, Radiology, Cardiology, Medications and Transcriptions reviewed  Scheduled Meds:   vitamin B-12  100 mcg Oral Daily    magnesium oxide  400 mg Oral Daily    insulin lispro  12 Units Subcutaneous TID AC    isosorbide mononitrate  120 mg Oral Daily    pantoprazole  20 mg Oral Daily    amLODIPine  10 mg Oral Daily    ARIPiprazole  5 mg Oral Daily    insulin glargine  35 Units Subcutaneous Nightly    pregabalin  75 mg Oral Daily    sertraline  50 mg Oral Daily    ticagrelor  90 mg Oral BID    insulin lispro  0-6 Units Subcutaneous TID WC    insulin lispro  0-3 Units Subcutaneous Nightly    ranolazine  500 mg Oral BID    carvedilol  12.5 mg Oral TID    atorvastatin  80 mg Oral Nightly    enoxaparin  30 mg Subcutaneous Daily    aspirin  81 mg Oral Daily     Continuous Infusions:   dextrose         Recent Labs     06/10/20  1841 06/11/20  0803   WBC 8.8 8.1   HGB 9.1* 9.3*   HCT 26.5* 27.2*   MCV 83.2 82.4    153     Recent Labs     06/10/20  1841   *   K 3.9   CL 95   CO2 18*   BUN 88*   CREATININE 3.16*     Recent Labs     06/10/20  1841   AST 26   ALT 23   BILITOT 0.3   ALKPHOS 101     No results for input(s): LIPASE, AMYLASE in the last 72 hours. Recent Labs     06/10/20  1841   PROT 6.8   INR 1.2     Nm Lung Vent/perfusion (vq)    Result Date: 5/24/2020  EXAMINATION: NM LUNG VENT/PERFUSION (VQ) CLINICAL HISTORY: ELEVATED D-DIMER COMPARISONS: CHEST RADIOGRAPH EARLIER SAME DAY.  FINDINGS: 1.0 millicuries technetium DTPA, and 5.1 mCi

## 2020-06-11 NOTE — PROGRESS NOTES
Physician Progress Note    2020   8:58 AM    Name:  Shweta Banks  MRN:    [id number]     IP Day: 0     Admit Date: 6/10/2020  5:16 PM  PCP: Hernan Donahue MD    Code Status:  Full Code      Assessment and Plan: Active Problems/ diagnosis:   Chest pain-atypical cardiac pain versus noncardiac pain. History of CAD status post PCI  Schizophrenia-labile  CKD 4  Diabetes  History of seizure  Hypertension  Hyperlipidemia    2020  -Continue to trend cardiac enzymes  -She was started on Brilinta yesterday by overnight nocturnist, resume statin, home medications. Pain is controlled at this time. DVT PPx     Subjective:      no new events. No new symptoms.     Physical Examination:      Vitals:  BP (!) 140/69   Pulse 68   Temp 98.2 °F (36.8 °C) (Oral)   Resp 18   Ht 5' 7\" (1.702 m)   Wt 231 lb 1.6 oz (104.8 kg)   LMP  (LMP Unknown)   SpO2 98%   Breastfeeding No   BMI 36.20 kg/m²   Temp (24hrs), Av °F (36.7 °C), Min:97.5 °F (36.4 °C), Max:98.2 °F (36.8 °C)      General appearance: alert, cooperative and no distress  Mental Status: oriented to person, place and time and normal affect  Lungs: clear to auscultation bilaterally, normal effort  Heart: regular rate and rhythm, no murmur  Abdomen: soft, nontender, nondistended, bowel sounds present, no masses  Extremities: no edema, redness, tenderness in the calves  Skin: no gross lesions, rashes    Data:     Labs:  Recent Labs     06/10/20  1841 20  0803   WBC 8.8 8.1   HGB 9.1* 9.3*    153     Recent Labs     06/10/20  184   *   K 3.9   CL 95   CO2 18*   BUN 88*   CREATININE 3.16*   GLUCOSE 133*     Recent Labs     06/10/20  184   AST 26   ALT 23   BILITOT 0.3   ALKPHOS 101       Current Facility-Administered Medications   Medication Dose Route Frequency Provider Last Rate Last Dose    vitamin B-12 (CYANOCOBALAMIN) tablet 100 mcg  100 mcg Oral Daily Vinnie Mejias DO        magnesium oxide (MAG-OX) tablet 400 mg  400 mg Oral Daily Claudine Jhaveriar, DO   400 mg at 06/11/20 0851    insulin lispro (HUMALOG) injection vial 12 Units  12 Units Subcutaneous TID AC Vinnie Mejias, DO        albuterol (PROVENTIL) nebulizer solution 2.5 mg  2.5 mg Nebulization Q6H PRN Claudine Mejias, DO        isosorbide mononitrate (IMDUR) extended release tablet 120 mg  120 mg Oral Daily Vinnie Mejias, DO   120 mg at 06/11/20 0858    pantoprazole (PROTONIX) tablet 20 mg  20 mg Oral Daily Vinnie CHANDRA Mejias, DO   20 mg at 06/11/20 0852    amLODIPine (NORVASC) tablet 10 mg  10 mg Oral Daily Cheryn Lundborg D Randell, DO   10 mg at 06/11/20 3790    ARIPiprazole (ABILIFY) tablet 5 mg  5 mg Oral Daily Chemotia Mejias, DO   5 mg at 06/11/20 7882    loperamide (IMODIUM) capsule 2 mg  2 mg Oral 4x Daily PRN Claudine Mejias, DO        insulin glargine (LANTUS) injection vial 35 Units  35 Units Subcutaneous Nightly Claudine Mejias, DO        nitroGLYCERIN (NITROSTAT) SL tablet 0.4 mg  0.4 mg Sublingual Q5 Min PRN Claudine Mejias, DO        pregabalin (LYRICA) capsule 75 mg  75 mg Oral Daily Claudine Mejias, DO   75 mg at 06/11/20 0852    sertraline (ZOLOFT) tablet 50 mg  50 mg Oral Daily Cheryn Lundborg CHANDRA Mejias, DO   50 mg at 06/11/20 8326    ticagrelor (BRILINTA) tablet 90 mg  90 mg Oral BID Chemotia Mejias, DO   90 mg at 06/11/20 0852    glucose (GLUTOSE) 40 % oral gel 15 g  15 g Oral PRN Claudine Mejias, DO        dextrose 50 % IV solution  12.5 g Intravenous PRN Claudine Mejias, DO        glucagon (rDNA) injection 1 mg  1 mg Intramuscular PRN Claudine Mejias, DO        dextrose 5 % solution  100 mL/hr Intravenous PRN Claudine Mejias, DO        insulin lispro (HUMALOG) injection vial 0-6 Units  0-6 Units Subcutaneous TID RENÉ Mejias DO        insulin lispro (HUMALOG) injection vial 0-3 Units  0-3 Units Subcutaneous Nightly Claudine Mejias DO        ranolazine (RANEXA) extended release tablet 500 mg  500 mg Oral BID Petra Suarez MD        carvedilol (COREG) tablet 12.5 mg  12.5 mg Oral TID Sofia Peña

## 2020-06-11 NOTE — H&P
MOUTH DAILY 6/5/20   Tara Donaldson MD   loperamide (RA ANTI-DIARRHEAL) 2 MG capsule Take 1 capsule by mouth 4 times daily as needed for Diarrhea 6/5/20 6/15/20  Tara Donaldson MD   isosorbide mononitrate (IMDUR) 120 MG extended release tablet TAKE 1 TABLET BY MOUTH DAILY 6/1/20   Klaus Duran MD   pantoprazole (PROTONIX) 20 MG tablet TAKE 1 TABLET BY MOUTH DAILY 6/1/20   Klaus Duran MD   furosemide (LASIX) 40 MG tablet TAKE 1 TABLET BY MOUTH DAILY 6/1/20   Klaus Duran MD   amLODIPine (NORVASC) 10 MG tablet TAKE 1 TABLET BY MOUTH DAILY 6/1/20   Klaus Duran MD   carvedilol (COREG) 12.5 MG tablet TAKE 1 TABLET BY MOUTH TWICE DAILY WITH MEALS 6/1/20   Klaus Duran MD   albuterol (PROVENTIL) (2.5 MG/3ML) 0.083% nebulizer solution Take 3 mLs by nebulization every 6 hours as needed for Wheezing  Patient not taking: Reported on 6/1/2020 5/30/20   Klaus Duran MD   nitrofurantoin (MACRODANTIN) 50 MG capsule Take 50 mg by mouth daily    Historical Provider, MD   insulin lispro (HUMALOG) 100 UNIT/ML injection vial Inject 12 Units into the skin 3 times daily (before meals)    Historical Provider, MD   LANADILENE SOLOSTAR 100 UNIT/ML injection pen Inject 35 units at night 5/14/20   Edson Delgado MD   aspirin 81 MG tablet Take 1 tablet by mouth daily 5/4/20   Tara Donaldson MD   atorvastatin (LIPITOR) 40 MG tablet Take 1 tablet by mouth daily 5/4/20   Tara Donaldson MD   [facility] KWIKPEN 100 UNIT/ML injection pen Inject 35 units nightly 4/8/20   Edson Delgado MD   sertraline (ZOLOFT) 50 MG tablet TAKE (1) TABLET BY MOUTH DAILY 4/6/20   Tara Donaldson MD   SURE COMFORT PEN NEEDLES 30G X 8 MM MISC USE AS DIRECTED FIVE TIMES A DAY 4/1/20   Tara Donaldson MD   Continuous Blood Gluc  (DEXCOM G6 ) CORETTA 1 Device by Does not apply route continuous 3/31/20   Edson Delgado MD   Continuous Blood Gluc Sensor (DEXCOM G6 SENSOR) MISC 1 Device by Does continue statin antiplatelets  4. Type 2 diabetes: Sliding scale insulin low-dose. Lantus 35, Humalog 12 3 times daily  5. CKD4 with hypokalemia: Avoid unnecessary nephrotoxic agents. Potassium supplementation  6. DVT prophylaxis with Lovenox    Patient Active Problem List   Diagnosis Code    Coronary artery disease involving native heart with angina pectoris (HCC) I25.119    Schizophrenia, paranoid, chronic A88.1    Metabolic syndrome Z93.18    Obesity E66.9    Vitamin B 12 deficiency E53.8    Cerebral microvascular disease I67.9    Mixed hyperlipidemia E78.2    Other hammer toe (acquired) M20.40    Vitamin D insufficiency E55.9    Incontinence of urine R32    Diabetic nephropathy with proteinuria ([facility]) E11.21    HTN (hypertension) I10    History of type C viral hepatitis Z86.19    Urinary incontinence due to cognitive impairment R39.81    History of seizures Z87.898    Stented coronary artery-plan is to stay on Plavix indefinately per Dr Jeronimo Larios Z95.5    Hemiparesis, left ([address] G81.94    Angina, class II ([address] I20.9    CKD (chronic kidney disease) stage 4, GFR 15-29 ml/min ([facility]) N18.4    Chronic painful diabetic neuropathy ([facility]) E11.40    Tardive dyskinesia G24.01    Shortness of breath R06.02    Uncontrolled type 2 diabetes mellitus with hyperglycemia ([facility]) T88.72    Diastolic CHF ([facility]) V11.48    Sleep apnea G47.30    Pulmonary hypertension ([facility]) I27.20    Encephalopathy G93.40    Obesity (BMI 30-39. 9) E66.9    Edema R60.9    Closed supracondylar fracture of right humerus S42.411A    Other chronic pain G89.29    Palliative care patient Z51.5    Chest pain R07.9       Fouzia Mccann MD  Admitting Hospitalist    Emergency Contact:

## 2020-06-11 NOTE — CARE COORDINATION
[facility] EMERGENCY [facility] Case Management Initial Discharge Assessment    Met with Patient to discuss discharge plan. PCP: Lyn Michelle MD                                Date of Last Visit: AUG    If no PCP, list provided? N/A    Discharge Planning    Living Arrangements: at home dependent on family care    Who do you live with? DTR    Who helps you with your care:  family    If lives at home:     Do you have any barriers navigating in your home? yes - WEAK    Patient can perform ADL? No    Current Services (outpatient and in home) :  [facility] W/EJQ- DTR IS AIDE    Dialysis: No    Is transportation available to get to your appointments? Yes- BUS OR DTR    DME Equipment:  yes - WALKER    Respiratory equipment: None    Respiratory provider:  no     Pharmacy:  yes - [address] with Medication Assistance Program?  No      Patient agreeable to [place] ? Declined    Patient agreeable to SNF/Rehab? Yes, [facility]    Other discharge needs identified? N/A    Freedom of choice list provided with basic dialogue that supports the patient's individualized plan of care/goals and shares the quality data associated with the providers. Yes    The plan for Transition of Care is related to the following treatment goals: Isidoro King    Initial Discharge Plan? (Note: please see concurrent daily documentation for any updates after initial note). SPOKE WITH PATIENT. SHE WANTS TO GO TO [facility]. SHE WAS THERE BEFORE AND DOES NOT WANT TO GO ANYWHERE ELSE. SHE ALSO DOES NOT WANT [facility]. PT HAS HAD A DAY STAY 5/24-5/30.  LSW UPDATED FOR REFERRAL TO [facility]    The Patient and/or patient representative: PATIENT was provided with choice of any post-acute providers for care and equipment and agrees with discharge plan  Yes    Electronically signed by Opal Wall RN on 6/11/2020 at 2:01 PM

## 2020-06-12 VITALS
BODY MASS INDEX: 36.27 KG/M2 | OXYGEN SATURATION: 99 % | DIASTOLIC BLOOD PRESSURE: 41 MMHG | WEIGHT: 231.1 LBS | HEIGHT: 67 IN | RESPIRATION RATE: 16 BRPM | HEART RATE: 56 BPM | TEMPERATURE: 97.9 F | SYSTOLIC BLOOD PRESSURE: 92 MMHG

## 2020-06-12 LAB
ANION GAP SERPL CALCULATED.3IONS-SCNC: 13 MEQ/L (ref 9–15)
BUN BLDV-MCNC: 87 MG/DL (ref 8–23)
CALCIUM SERPL-MCNC: 9.1 MG/DL (ref 8.5–9.9)
CHLORIDE BLD-SCNC: 104 MEQ/L (ref 95–107)
CO2: 19 MEQ/L (ref 20–31)
CREAT SERPL-MCNC: 3.49 MG/DL (ref 0.5–0.9)
GFR AFRICAN AMERICAN: 16
GFR NON-AFRICAN AMERICAN: 13.3
GLUCOSE BLD-MCNC: 146 MG/DL (ref 70–99)
GLUCOSE BLD-MCNC: 148 MG/DL (ref 60–115)
GLUCOSE BLD-MCNC: 184 MG/DL (ref 60–115)
GLUCOSE BLD-MCNC: 305 MG/DL (ref 60–115)
HCT VFR BLD CALC: 26.8 % (ref 37–47)
HEMOGLOBIN: 9.2 G/DL (ref 12–16)
MCH RBC QN AUTO: 28.2 PG (ref 27–31.3)
MCHC RBC AUTO-ENTMCNC: 34.3 % (ref 33–37)
MCV RBC AUTO: 82.2 FL (ref 82–100)
PDW BLD-RTO: 14.3 % (ref 11.5–14.5)
PERFORMED ON: ABNORMAL
PLATELET # BLD: 151 K/UL (ref 130–400)
POTASSIUM SERPL-SCNC: 3.9 MEQ/L (ref 3.4–4.9)
RBC # BLD: 3.26 M/UL (ref 4.2–5.4)
SODIUM BLD-SCNC: 136 MEQ/L (ref 135–144)
WBC # BLD: 8.1 K/UL (ref 4.8–10.8)

## 2020-06-12 PROCEDURE — 6370000000 HC RX 637 (ALT 250 FOR IP): Performed by: INTERNAL MEDICINE

## 2020-06-12 PROCEDURE — 93010 ELECTROCARDIOGRAM REPORT: CPT | Performed by: INTERNAL MEDICINE

## 2020-06-12 PROCEDURE — 6360000002 HC RX W HCPCS: Performed by: INTERNAL MEDICINE

## 2020-06-12 PROCEDURE — 99213 OFFICE O/P EST LOW 20 MIN: CPT | Performed by: INTERNAL MEDICINE

## 2020-06-12 PROCEDURE — 96372 THER/PROPH/DIAG INJ SC/IM: CPT

## 2020-06-12 PROCEDURE — 85027 COMPLETE CBC AUTOMATED: CPT

## 2020-06-12 PROCEDURE — 97166 OT EVAL MOD COMPLEX 45 MIN: CPT

## 2020-06-12 PROCEDURE — 36415 COLL VENOUS BLD VENIPUNCTURE: CPT

## 2020-06-12 PROCEDURE — G0378 HOSPITAL OBSERVATION PER HR: HCPCS

## 2020-06-12 PROCEDURE — 97162 PT EVAL MOD COMPLEX 30 MIN: CPT

## 2020-06-12 PROCEDURE — 80048 BASIC METABOLIC PNL TOTAL CA: CPT

## 2020-06-12 RX ORDER — FUROSEMIDE 40 MG/1
80 TABLET ORAL DAILY
Qty: 90 TABLET | Refills: 1 | Status: SHIPPED | OUTPATIENT
Start: 2020-06-12 | End: 2020-06-12 | Stop reason: SDUPTHER

## 2020-06-12 RX ORDER — RANOLAZINE 500 MG/1
500 TABLET, EXTENDED RELEASE ORAL 2 TIMES DAILY
Qty: 60 TABLET | Refills: 3 | Status: SHIPPED | OUTPATIENT
Start: 2020-06-12 | End: 2020-11-24 | Stop reason: SDUPTHER

## 2020-06-12 RX ORDER — RANOLAZINE 500 MG/1
500 TABLET, EXTENDED RELEASE ORAL 2 TIMES DAILY
Qty: 60 TABLET | Refills: 3 | Status: SHIPPED | OUTPATIENT
Start: 2020-06-12 | End: 2020-06-12

## 2020-06-12 RX ORDER — FUROSEMIDE 40 MG/1
80 TABLET ORAL DAILY
Qty: 90 TABLET | Refills: 1 | Status: ON HOLD | OUTPATIENT
Start: 2020-06-12 | End: 2020-07-02 | Stop reason: HOSPADM

## 2020-06-12 RX ADMIN — AMLODIPINE BESYLATE 10 MG: 10 TABLET ORAL at 08:06

## 2020-06-12 RX ADMIN — ASPIRIN 81 MG: 81 TABLET, COATED ORAL at 08:05

## 2020-06-12 RX ADMIN — PREGABALIN 75 MG: 75 CAPSULE ORAL at 08:06

## 2020-06-12 RX ADMIN — INSULIN LISPRO 12 UNITS: 100 INJECTION, SOLUTION INTRAVENOUS; SUBCUTANEOUS at 12:15

## 2020-06-12 RX ADMIN — SERTRALINE HYDROCHLORIDE 50 MG: 50 TABLET, FILM COATED ORAL at 08:06

## 2020-06-12 RX ADMIN — ARIPIPRAZOLE 5 MG: 5 TABLET ORAL at 08:06

## 2020-06-12 RX ADMIN — ENOXAPARIN SODIUM 30 MG: 30 INJECTION SUBCUTANEOUS at 08:05

## 2020-06-12 RX ADMIN — INSULIN LISPRO 12 UNITS: 100 INJECTION, SOLUTION INTRAVENOUS; SUBCUTANEOUS at 16:49

## 2020-06-12 RX ADMIN — RANOLAZINE 500 MG: 500 TABLET, FILM COATED, EXTENDED RELEASE ORAL at 08:05

## 2020-06-12 RX ADMIN — INSULIN LISPRO 12 UNITS: 100 INJECTION, SOLUTION INTRAVENOUS; SUBCUTANEOUS at 08:08

## 2020-06-12 RX ADMIN — TICAGRELOR 90 MG: 90 TABLET ORAL at 08:06

## 2020-06-12 RX ADMIN — VITAM B12 100 MCG: 100 TAB at 17:18

## 2020-06-12 RX ADMIN — Medication 400 MG: at 08:05

## 2020-06-12 RX ADMIN — ISOSORBIDE MONONITRATE 120 MG: 120 TABLET ORAL at 08:06

## 2020-06-12 RX ADMIN — CARVEDILOL 12.5 MG: 12.5 TABLET, FILM COATED ORAL at 08:05

## 2020-06-12 RX ADMIN — PANTOPRAZOLE SODIUM 20 MG: 20 TABLET, DELAYED RELEASE ORAL at 08:05

## 2020-06-12 ASSESSMENT — ENCOUNTER SYMPTOMS
NAUSEA: 0
WHEEZING: 0
SHORTNESS OF BREATH: 0
EYES NEGATIVE: 1
RESPIRATORY NEGATIVE: 1
BLOOD IN STOOL: 0
GASTROINTESTINAL NEGATIVE: 1
CHEST TIGHTNESS: 0
COUGH: 0
STRIDOR: 0

## 2020-06-12 ASSESSMENT — PAIN SCALES - GENERAL
PAINLEVEL_OUTOF10: 0

## 2020-06-12 NOTE — PROGRESS NOTES
Normotonic  Tone LUE  LUE Tone: Hypotonic  Coordination  Movements Are Fluid And Coordinated: No  Coordination and Movement description: Fine motor impairments, Decreased speed, Decreased accuracy    Hand Dominance:  Hand Dominance  Hand Dominance: Left    ADL Status:  ADL  Feeding: Setup  Grooming: Setup  UE Bathing: Setup  LE Bathing: Maximum assistance  UE Dressing: Setup  LE Dressing: Maximum assistance  Toileting: Unable to assess(comment)(anticipate min A)  Toilet Transfers  Toilet Transfer: Unable to assess  Toilet Transfers Comments: anticipate CGA       Therapy key for assistance levels -   Independent = Pt. is able to perform task with no assistance but may require a device   Stand by assistance = Pt. does not perform task at an independent level but does not need physical assistance, requires verbal cues  Minimal, Moderate, Maximal Assistance = Pt. requires physical assistance (25%, 50%, 75% assist from helper) for task but is able to actively participate in task   Dependent = Pt. requires total assistance with task and is not able to actively participate with task completion     Functional Mobility:     Transfers  Sit to stand: Supervision  Stand to sit: Supervision    Bed Mobility  Bed mobility  Rolling to Right: Stand by assistance  Supine to Sit: Supervision  Sit to Supine: Supervision    Seated and Standing Balance:  Balance  Sitting Balance: Supervision  Standing Balance: Stand by assistance    Functional Endurance:  Activity Tolerance  Activity Tolerance: Patient Tolerated treatment well    D/C Recommendations:  OT D/C RECOMMENDATIONS  REQUIRES OT FOLLOW UP: Yes      OT Follow Up:  OT D/C RECOMMENDATIONS  REQUIRES OT FOLLOW UP: Yes       Assessment/Discharge Disposition:     Performance deficits / Impairments: Decreased functional mobility , Decreased strength, Decreased endurance, Decreased coordination, Decreased ADL status, Decreased balance, Decreased fine motor control  Prognosis:

## 2020-06-12 NOTE — PROGRESS NOTES
Assessment completed earlier in the shift. VSS. . Snack and insulin given. At 2200 pt was c/o 6/10 chest pain. /48. HR 62 Tele-NSR. 1 nitro was administered. Pts pain resolved after nitro. Melatonin ordered from hospitalist and pt is sleeping well. Call light in reach. Will continue to monitor.  Electronically signed by Andrey Rdz RN on 6/12/2020 at 2:23 AM

## 2020-06-12 NOTE — PROGRESS NOTES
Physical Therapy Med Surg Initial Assessment  Facility/Department: [address] [facility]  Room: [place] [address]       NAME: Amandeep Rivera  : 1960 (16 y.o.)  MRN: [id number]  CODE STATUS: Full Code    Date of Service: 2020    Patient Diagnosis(es): Chest pain [R07.9]   Chief Complaint   Patient presents with    Chest Pain     pt c/o chest painand SOB thsat started this AM     Patient Active Problem List    Diagnosis Date Noted    Chest pain 2020    Edema 2019    Closed supracondylar fracture of right humerus 2019    Other chronic pain 2019    Palliative care patient 2019    Obesity (BMI 30-39.9) 2019    Encephalopathy 2019    Sleep apnea 2019    Pulmonary hypertension ([address]     Diastolic CHF ([address]     Uncontrolled type 2 diabetes mellitus with hyperglycemia ([address]     Shortness of breath 10/02/2019    Tardive dyskinesia 2019    Chronic painful diabetic neuropathy (HCC)     CKD (chronic kidney disease) stage 4, GFR 15-29 ml/min ([address] 2018    Angina, class II ([address]     Stented coronary artery-plan is to stay on Plavix indefinately per Dr Jeronimo Larios 2016    History of seizures     Urinary incontinence due to cognitive impairment     HTN (hypertension)     History of type C viral hepatitis     Diabetic nephropathy with proteinuria ([address]     Incontinence of urine 2014    Vitamin D insufficiency 2014    Vitamin B 12 deficiency 2013    Cerebral microvascular disease 2013    Hemiparesis, left ([address] 2013    Coronary artery disease involving native heart with angina pectoris ([address]     Schizophrenia, paranoid, chronic     Metabolic syndrome     Obesity     Mixed hyperlipidemia 2010    Other hammer toe (acquired) 2007        Past Medical History:   Diagnosis Date    Anxiety     CAD S/P percutaneous coronary angioplasty ,     stent per dr Harjit Tuttle dynamic and and reactive balance activities     Motor Control  Gross Motor?: WFL  Sensation  Overall Sensation Status: WFL    Bed mobility  Supine to Sit: Supervision  Sit to Supine: Supervision    Transfers  Sit to Stand: Stand by assistance  Stand to sit: Stand by assistance    Ambulation  Ambulation?: Yes  Ambulation 1  Surface: level tile  Device: No Device  Assistance: Stand by assistance  Quality of Gait: trendelenberg lurch, decreased gait speed  Gait Deviations: Slow Tere; Increased JAKE  Distance: 25ft  Comments: SOB upon exertion      PT Education  PT Education: Goals;PT Role;Plan of Care;General Safety    ASSESSMENT:   Body structures, Functions, Activity limitations: Decreased functional mobility ; Decreased ADL status; Decreased strength;Decreased high-level IADLs;Decreased balance  Decision Making: Medium Complexity  History: med  Exam: med  Clinical Presentation: med    Prognosis: Good  Barriers to Learning: none    DISCHARGE RECOMMENDATIONS:  Discharge Recommendations: Continue to assess pending progress    Assessment: Pt presents with decreased functional mobility, impaired BLE strength, impaired standing balance, and impaired cardiorespiratory endurance. Pt reports that she is functioning at 90% of her baseline. Pt would benefit from PT to address deficits, improve indep, decrease fall risk, and improve QOL. REQUIRES PT FOLLOW UP: Yes      PLAN OF CARE:  Plan  Times per week: 3-6  Current Treatment Recommendations: Strengthening, Functional Mobility Training, Neuromuscular Re-education, Home Exercise Program, Equipment Evaluation, Education, & procurement, Modalities, Safety Education & Training, Gait Training, Transfer Training, Balance Training, Stair training, Patient/Caregiver Education & Training, Positioning  Safety Devices  Type of devices:  All fall risk precautions in place    Goals:  Patient goals : \"get stronger, get moving, be more indep\"  Long term goals  Long term goal 1: bed mobility with indep   Long term goal 2: functional transfers with indep   Long term goal 3: amb 150ft with indep   Long term goal 4: DGI >21/24 to demonstrate decreased fall risk    AMPAC (6 CLICK) BASIC MOBILITY  AM-PAC Inpatient Mobility Raw Score : 19     Therapy Time:   Individual   Time In [address] [place] [address]   Time Out 0957   Minutes [address] [place], [place], 06/12/20 at 10:04 AM         Definitions for assistance levels  Independent = pt does not require any physical supervision or assistance from another person for activity completion. Device may be needed.   Stand by assistance = pt requires verbal cues or instructions from another person, close to but not touching, to perform the activity  Minimal assistance= pt performs 75% or more of the activity; assistance is required to complete the activity  Moderate assistance= pt performs 50% of the activity; assistance is required to complete the activity  Maximal assistance = pt performs 25% of the activity; assistance is required to complete the activity  Dependent = pt requires total physical assistance to accomplish the task

## 2020-06-12 NOTE — PROGRESS NOTES
6/21/2018    LEFT KNEE TOTAL KNEE ARTHROPLASTY, SHAYNA, NERVE BLOCK performed by Adina Atwood MD at [facility] TOE AMPUTATION Right     TOTAL KNEE ARTHROPLASTY  05/19/16    Dr Heladio Oconnell     Family History   Problem Relation Age of Onset    Cancer Mother 76        survived   [facility] Hypertension Father     Diabetes Sister     Mental Illness Sister      Social History     Socioeconomic History    Marital status:      Spouse name: None    Number of children: 2    Years of education: None    Highest education level: None   Occupational History    Occupation: disabled   Social Needs    Financial resource strain: None    Food insecurity     Worry: None     Inability: None    Transportation needs     Medical: None     Non-medical: None   Tobacco Use    Smoking status: Passive Smoke Exposure - Never Smoker    Smokeless tobacco: Never Used   Substance and Sexual Activity    Alcohol use: No     Alcohol/week: 0.0 standard drinks    Drug use: No    Sexual activity: Not Currently   Lifestyle    Physical activity     Days per week: None     Minutes per session: None    Stress: None   Relationships    Social connections     Talks on phone: None     Gets together: None     Attends Mandaeism service: None     Active member of club or organization: None     Attends meetings of clubs or organizations: None     Relationship status: None    Intimate partner violence     Fear of current or ex partner: None     Emotionally abused: None     Physically abused: None     Forced sexual activity: None   Other Topics Concern    None   Social History Narrative    Born in [place], one of 5    Twin sister Reyes, very ill in 2018, [place] [address]    Moved to [facility], , 2 children, one son and one daughter    Worked at [employer], as a nurse's aide    Disabled due to mental illness    Lived at [facility], was discharged, returned to independent living in 2017 in the daughter's house and has adjusted well    One son and one daughter, live in the same house with patient, Letha Bowden pays the rent    [employer] reading (misteries)       Subjective/HPI tolerate Ranexa well. Only 1 brief CP last pm. No CP this am. No arrhythmia on Telemetry. EKG:SR        Review of Systems:   Review of Systems   Constitutional: Negative. Negative for diaphoresis and fatigue. HENT: Negative. Eyes: Negative. Respiratory: Negative. Negative for cough, chest tightness, shortness of breath, wheezing and stridor. Cardiovascular: Negative. Negative for chest pain, palpitations and leg swelling. Gastrointestinal: Negative. Negative for blood in stool and nausea. Genitourinary: Negative. Musculoskeletal: Negative. Skin: Negative. Neurological: Negative. Negative for dizziness, syncope, weakness and light-headedness. Hematological: Negative. Psychiatric/Behavioral: Negative. Physical Examination:    BP (!) 127/55   Pulse 60   Temp 97.3 °F (36.3 °C) (Oral)   Resp 16   Ht 5' 7\" (1.702 m)   Wt 231 lb 1.6 oz (104.8 kg)   LMP  (LMP Unknown)   SpO2 100%   Breastfeeding No   BMI 36.20 kg/m²    Physical Exam   Constitutional: She appears healthy. No distress. HENT:   Normal cephalic and Atraumatic   Eyes: Pupils are equal, round, and reactive to light. Neck: Normal range of motion and thyroid normal. Neck supple. No JVD present. No neck adenopathy. No thyromegaly present. Cardiovascular: Normal rate, regular rhythm, intact distal pulses and normal pulses. Murmur heard. Pulmonary/Chest: Effort normal and breath sounds normal. She has no wheezes. She has no rales. She exhibits no tenderness. Abdominal: Soft. Bowel sounds are normal. There is no abdominal tenderness. Musculoskeletal: Normal range of motion. General: No tenderness or edema. Neurological: She is alert and oriented to person, place, and time. Skin: Skin is warm. No cyanosis. Nails show no clubbing.

## 2020-06-12 NOTE — DISCHARGE SUMMARY
reflexes. Psychiatric: Alert and oriented, thought content appropriate, normal insight  Capillary Refill: Brisk,< 3 seconds   Peripheral Pulses: +2 palpable, equal bilaterally     Patient was seen by the following consultants while admitted to [facility]:   Consults:  [address]    Significant Diagnostic Studies:    Refer to chart     Please refer to chart if no studies are shown here    Xr Chest Portable    Result Date: 6/11/2020  EXAMINATION: XR CHEST PORTABLE DATE AND TIME:6/10/2020 5:30 PM CLINICAL HISTORY: Shortness of breath   chest pain  COMPARISONS: May 26, 2020  FINDINGS: The heart, mediastinum and pulmonary vasculature are within normal limits. Visualized lung fields are clear. Bones unremarkable. NO ACTIVE LUNG DISEASE.        Discharge Medications:       [facility]:[id number]    Printed on:06/12/20 0912   Medication Information                      albuterol (PROVENTIL) (2.5 MG/3ML) 0.083% nebulizer solution  Take 3 mLs by nebulization every 6 hours as needed for Wheezing             amLODIPine (NORVASC) 10 MG tablet  TAKE 1 TABLET BY MOUTH DAILY             ARIPiprazole (ABILIFY) 5 MG tablet  Take 1 tablet by mouth daily             aspirin 81 MG tablet  Take 1 tablet by mouth daily             atorvastatin (LIPITOR) 40 MG tablet  Take 1 tablet by mouth daily             BASAGLAR KWIKPEN 100 UNIT/ML injection pen  Inject 35 units nightly             carvedilol (COREG) 12.5 MG tablet  TAKE 1 TABLET BY MOUTH TWICE DAILY WITH MEALS             Continuous Blood Gluc  (DEXCOM G6 ) CORETTA  1 Device by Does not apply route continuous             Continuous Blood Gluc Sensor (DEXCOM G6 SENSOR) MISC  1 Device by Does not apply route continuous             Continuous Blood Gluc Transmit (DEXCOM G6 TRANSMITTER) MISC  1 Device by Does not apply route continuous             D3-1000 1000 units CAPS  TAKE 1 CAPSULE BY MOUTH ONCE DAILY             furosemide (LASIX) 40 MG tablet  TAKE 1 TABLET BY MOUTH DAILY             insulin lispro (HUMALOG) 100 UNIT/ML injection vial  Inject 12 Units into the skin 3 times daily (before meals)             isosorbide mononitrate (IMDUR) 120 MG extended release tablet  TAKE 1 TABLET BY MOUTH DAILY             LANTUS SOLOSTAR 100 UNIT/ML injection pen  Inject 35 units at night             loperamide (RA ANTI-DIARRHEAL) 2 MG capsule  Take 1 capsule by mouth 4 times daily as needed for Diarrhea             magnesium oxide (MAG-OX) 400 MG tablet  Take 400 mg by mouth daily             Magnesium Oxide -Mg Supplement 400 MG CAPS  TAKE 1 CAPSULE BY MOUTH DAILY             melatonin 3 MG TABS tablet  TAKE 1 TABLET BY MOUTH EVERY NIGHT AS NEEDED FOR INSOMNIA             nitroGLYCERIN (NITROSTAT) 0.4 MG SL tablet  Place 1 tablet under the tongue every 5 minutes as needed for Chest pain             pantoprazole (PROTONIX) 20 MG tablet  TAKE 1 TABLET BY MOUTH DAILY             pregabalin (LYRICA) 75 MG capsule  Give one capsule po bid             ranolazine (RANEXA) 500 MG extended release tablet  Take 1 tablet by mouth 2 times daily             sertraline (ZOLOFT) 50 MG tablet  TAKE (1) TABLET BY MOUTH DAILY             SURE COMFORT PEN NEEDLES 30G X 8 MM MISC  USE AS DIRECTED FIVE TIMES A DAY             ticagrelor (BRILINTA) 90 MG TABS tablet  Take 1 tablet by mouth 2 times daily             vitamin B-12 (CYANOCOBALAMIN) 100 MCG tablet  TAKE 1 TABLET BY MOUTH DAILY                 Disposition:   If discharged to Home, Any [place] [address] needs that were indicated and/or required as been addressed and set up by Social Work. Condition at discharge: good     Activity: activity as tolerated    Total time taken for discharging this patient: 40 minutes. Greater than 70% of time was spent focused exclusively on this patient.  Time was taken to review chart, discuss plans with consultants, reconciling medications, discussing plan answering questions with patient.      Paradise Diamond  6/12/2020, 9:12 AM  ----------------------------------------------------------------------------------------------------------------------    Leslee Weir,

## 2020-06-12 NOTE — DISCHARGE INSTR - COC
Continuity of Care Form    Patient Name: Wali White   :  1960  MRN:  [id number]    Admit date:  6/10/2020  Discharge date:  2020    Code Status Order: Full Code   Advance Directives:   [facility] Documentation     Date/Time Healthcare Directive Type of Healthcare Directive Copy in [address] Agent's Name Healthcare Agent's Phone Number    06/10/20 3620  Yes, patient has an advance directive for healthcare treatment  Durable power of  for health care --  Adult Filiberto Arevalo  [phone]          Admitting Physician:  Tony Morrison DO  PCP: Jori Dlaey MD    Discharging Nurse: [facility] Unit/Room#: S403/D411-21  Discharging Unit Phone Number: ***    Emergency Contact:   Extended Emergency Contact Information  Primary Emergency Contact: Yasmeen Allan [address] Phone: [phone]  Work Phone: [phone]  Mobile Phone: [phone]  Relation: Child    Past Surgical History:  Past Surgical History:   Procedure Laterality Date     SECTION      x1    COLONOSCOPY  2014    Dr. Althea Iqbal      x1 Dr. Yunior Cortés, Dr Odell Sabot CATH LAB PROCEDURE  10/02/2019    HYSTERECTOMY, TOTAL ABDOMINAL      one ovary intact, Dr Zhao Verdugo, menorrhagia    [facility] Left 2018    LEFT KNEE TOTAL KNEE ARTHROPLASTY, SHAYNA, NERVE BLOCK performed by Lee Alnoso MD at [address] [place] [address] Right     TOTAL KNEE ARTHROPLASTY  16    Dr Kristel Beal       Immunization History:   Immunization History   Administered Date(s) Administered    Influenza Vaccine, unspecified formulation 10/14/2016    Influenza Virus Vaccine 10/20/2014, 10/30/2015, 10/14/2016, 2017, 10/12/2018    Influenza Whole 10/20/2014    Influenza, Quadv, IM, (6 mo and older Fluzone, Flulaval, Fluarix and 3 yrs and older Afluria) 09/29/2017, 10/12/2018    Influenza, [place], IM, PF (6 mo and older Fluzone, Flulaval, Fluarix, and 3 yrs and older Afluria) 10/03/2019    Influenza, Triv, 3 Years and older, IM (Afluria (5 yrs and older) 10/14/2016    Pneumococcal Polysaccharide ([id number]) 10/15/2016       Active Problems:  Patient Active Problem List   Diagnosis Code    Coronary artery disease involving native heart with angina pectoris (HCC) I25.119    Schizophrenia, paranoid, chronic U95.9    Metabolic syndrome F21.58    Obesity E66.9    Vitamin B 12 deficiency E53.8    Cerebral microvascular disease I67.9    Mixed hyperlipidemia E78.2    Other hammer toe (acquired) M20.40    Vitamin D insufficiency E55.9    Incontinence of urine R32    Diabetic nephropathy with proteinuria ([facility]) E11.21    HTN (hypertension) I10    History of type C viral hepatitis Z86.19    Urinary incontinence due to cognitive impairment R39.81    History of seizures Z87.898    Stented coronary artery-plan is to stay on Plavix indefinately per Dr Manuela Burger Z95.5    Hemiparesis, left ([address] G81.94    Angina, class II ([address] I20.9    CKD (chronic kidney disease) stage 4, GFR 15-29 ml/min ([facility]) N18.4    Chronic painful diabetic neuropathy ([facility]) E11.40    Tardive dyskinesia G24.01    Shortness of breath R06.02    Uncontrolled type 2 diabetes mellitus with hyperglycemia ([facility]) A48.36    Diastolic CHF ([facility]) U25.45    Sleep apnea G47.30    Pulmonary hypertension (HCC) I27.20    Encephalopathy G93.40    Obesity (BMI 30-39. 9) E66.9    Edema R60.9    Closed supracondylar fracture of right humerus S42.411A    Other chronic pain G89.29    Palliative care patient Z51.5    Chest pain R07.9       Isolation/Infection:   Isolation          No Isolation        Patient Infection Status     None to display          Nurse Assessment:  Last Vital Signs: BP (!) 92/41   Pulse 56   Temp 97.9 °F (36.6 °C) (Oral)   Resp 16   Ht 5' 7\" (1.702 m)   Wt 231 lb 1.6 Risk of Unplanned Readmission:        0           Discharging to Facility/ Agency     · Address:  · Phone  · Fax:    Dialysis Facility (if applicable)   · Name:  · Address:  · Dialysis Schedule:  · Phone:  · Fax:    / signature: Electronically signed by HIPOLITO Arora on 6/12/20 at 11:24 AM EDT    PHYSICIAN SECTION    Prognosis: Fair    Condition at Discharge: Stable    Rehab Potential (if transferring to Rehab): Good    Recommended Labs or Other Treatments After Discharge:     Physician Certification: I certify the above information and transfer of Nettie Vazquez  is necessary for the continuing treatment of the diagnosis listed and that she requires George Love for less 30 days.      Update Admission H&P: No change in H&P    PHYSICIAN SIGNATURE:  Electronically signed by Wang Brown DO on 6/12/20 at 9:12 AM EDT

## 2020-06-13 ENCOUNTER — CARE COORDINATION (OUTPATIENT)
Dept: CARE COORDINATION | Age: 60
End: 2020-06-13

## 2020-06-13 NOTE — CARE COORDINATION
Ambulatory Care Coordination Note  6/13/2020  CM Risk Score: 15  Charlson 10 Year Mortality Risk Score: 100%     ACC: Ann Marie Moore, RN    Summary Note: received message from hospital nurse at hospital requesting care coordination to assist in the management of walter due to chronic conditions. Call to walter and she is in agreement with program. She does monitor blood sugar with range in the 200's. She reports shortness of breath but has not noted decline in resp status since home from hospital. She lives with her daughter. Receives meals on wheels and [facility] services with therapy. Will place referral to pharmacy and dietician. Zone sheet education started. Exact care for meds and reports compliance  Diabetes Assessment    Medic Alert ID:  No  Meal Planning:  Avoidance of concentrated sweets, Carb counting   How often do you test your blood sugar?:  Meals, Bedtime   Do you have barriers with adherence to non-pharmacologic self-management interventions?  (Nutrition/Exercise/Self-Monitoring):  Yes   Have you ever had to go to the ED for symptoms of low blood sugar?:  No       No patient-reported symptoms       and   Congestive Heart Failure Assessment    Are you currently restricting fluids?:  No Restriction  Do you understand a low sodium diet?:  Yes  Do you understand how to read food labels?:  Yes  How many restaurant meals do you eat per week?:  1-2  Do you salt your food before tasting it?:  No     No patient-reported symptoms      Symptoms:   CHF associated dyspnea on exertion: Pos, CHF associated shortness of breath: Pos      Symptom course:  stable  Weight trend:  stable  Salt intake watch compared to last visit:  stable         Ambulatory Care Coordination Assessment    Care Coordination Protocol  Program Enrollment:  Complex Care  Referral from Primary Care Provider:  No  Week 1 - Initial Assessment     Do you have all of your prescriptions and are they filled?:  Yes  Barriers to medication adherence: impact on mental well-being e.g. \"\"feeling fed-up\"\", \"\"reduced enjoyment\"\"   Are there any problems with your patients lifestyle behaviors (alcohol, drugs, diet, exercise) that are impacting on physical or mental well-being?:  Some mild concern of potential negative impact on well-being   Do you have any other concerns about your patients mental well-being? How would you rate their severity and impact on the patient?:  No identified areas of concern   How would you rate their home environment in terms of safety and stability (including domestic violence, insecure housing, neighbor harassment)?:  Consistently safe, supportive, stable, no identified problems   How do daily activities impact on the patient's well-being? (include current or anticipated unemployment, work, caregiving, access to transportation or other):  Some general dissatisfaction but no concern   How would you rate their social network (family, work, friends)?:  Adequate participation with social networks   How would you rate their financial resources (including ability to afford all required medical care)?:  Financially secure, resources adequate, no identified problems   How wells does the patient now understand their health and well-being (symptoms, signs or risk factors) and what they need to do to manage their health?:  Reasonable to good understanding and already engages in managing health or is willing to undertake better management   How well do you think your patient can engage in healthcare discussions? (Barriers include language, deafness, aphasia, alcohol or drug problems, learning difficulties, concentration):  Clear and open communication, no identified barriers   Do other services need to be involved to help this patient?:  Other care/services not in place and required   Are current services involved with this patient well-coordinated?  (Include coordination with other services you are now recommendation):  Required care/services

## 2020-06-14 ENCOUNTER — HOSPITAL ENCOUNTER (OUTPATIENT)
Age: 60
Setting detail: OBSERVATION
Discharge: NURSING, REHAB OR LONG-TERM CARE | End: 2020-06-15
Attending: FAMILY MEDICINE | Admitting: INTERNAL MEDICINE
Payer: MEDICARE

## 2020-06-14 ENCOUNTER — APPOINTMENT (OUTPATIENT)
Dept: GENERAL RADIOLOGY | Age: 60
End: 2020-06-14
Payer: MEDICARE

## 2020-06-14 PROBLEM — R06.00 DYSPNEA: Status: ACTIVE | Noted: 2020-06-14

## 2020-06-14 LAB
ALBUMIN SERPL-MCNC: 3.5 G/DL (ref 3.5–4.6)
ALP BLD-CCNC: 110 U/L (ref 40–130)
ALT SERPL-CCNC: 28 U/L (ref 0–33)
ANION GAP SERPL CALCULATED.3IONS-SCNC: 13 MEQ/L (ref 9–15)
AST SERPL-CCNC: 28 U/L (ref 0–35)
BASOPHILS ABSOLUTE: 0 K/UL (ref 0–0.2)
BASOPHILS RELATIVE PERCENT: 0.5 %
BILIRUB SERPL-MCNC: 0.4 MG/DL (ref 0.2–0.7)
BUN BLDV-MCNC: 81 MG/DL (ref 8–23)
CALCIUM SERPL-MCNC: 9.7 MG/DL (ref 8.5–9.9)
CHLORIDE BLD-SCNC: 99 MEQ/L (ref 95–107)
CK MB: 3.7 NG/ML (ref 0–3.8)
CO2: 19 MEQ/L (ref 20–31)
CREAT SERPL-MCNC: 3.72 MG/DL (ref 0.5–0.9)
CREATINE KINASE-MB INDEX: 3.3 % (ref 0–3.5)
EKG ATRIAL RATE: 58 BPM
EKG P AXIS: 53 DEGREES
EKG P-R INTERVAL: 270 MS
EKG Q-T INTERVAL: 450 MS
EKG QRS DURATION: 108 MS
EKG QTC CALCULATION (BAZETT): 441 MS
EKG R AXIS: -50 DEGREES
EKG T AXIS: 27 DEGREES
EKG VENTRICULAR RATE: 58 BPM
EOSINOPHILS ABSOLUTE: 0.4 K/UL (ref 0–0.7)
EOSINOPHILS RELATIVE PERCENT: 4.1 %
GFR AFRICAN AMERICAN: 14.9
GFR NON-AFRICAN AMERICAN: 12.3
GLOBULIN: 3.2 G/DL (ref 2.3–3.5)
GLUCOSE BLD-MCNC: 94 MG/DL (ref 70–99)
HCT VFR BLD CALC: 27.3 % (ref 37–47)
HEMOGLOBIN: 9.4 G/DL (ref 12–16)
LYMPHOCYTES ABSOLUTE: 1.5 K/UL (ref 1–4.8)
LYMPHOCYTES RELATIVE PERCENT: 17.3 %
MCH RBC QN AUTO: 28.2 PG (ref 27–31.3)
MCHC RBC AUTO-ENTMCNC: 34.5 % (ref 33–37)
MCV RBC AUTO: 81.8 FL (ref 82–100)
MONOCYTES ABSOLUTE: 0.7 K/UL (ref 0.2–0.8)
MONOCYTES RELATIVE PERCENT: 7.5 %
NEUTROPHILS ABSOLUTE: 6.3 K/UL (ref 1.4–6.5)
NEUTROPHILS RELATIVE PERCENT: 70.6 %
PDW BLD-RTO: 14.5 % (ref 11.5–14.5)
PLATELET # BLD: 199 K/UL (ref 130–400)
POTASSIUM SERPL-SCNC: 4.4 MEQ/L (ref 3.4–4.9)
PRO-BNP: 5807 PG/ML
PROCALCITONIN: 0.1 NG/ML (ref 0–0.15)
RBC # BLD: 3.34 M/UL (ref 4.2–5.4)
SARS-COV-2, NAAT: NOT DETECTED
SODIUM BLD-SCNC: 131 MEQ/L (ref 135–144)
TOTAL CK: 112 U/L (ref 0–170)
TOTAL PROTEIN: 6.7 G/DL (ref 6.3–8)
TROPONIN: 0.03 NG/ML (ref 0–0.01)
WBC # BLD: 8.9 K/UL (ref 4.8–10.8)

## 2020-06-14 PROCEDURE — 93005 ELECTROCARDIOGRAM TRACING: CPT

## 2020-06-14 PROCEDURE — G0378 HOSPITAL OBSERVATION PER HR: HCPCS

## 2020-06-14 PROCEDURE — 84145 PROCALCITONIN (PCT): CPT

## 2020-06-14 PROCEDURE — 82553 CREATINE MB FRACTION: CPT

## 2020-06-14 PROCEDURE — 83880 ASSAY OF NATRIURETIC PEPTIDE: CPT

## 2020-06-14 PROCEDURE — 84484 ASSAY OF TROPONIN QUANT: CPT

## 2020-06-14 PROCEDURE — U0002 COVID-19 LAB TEST NON-CDC: HCPCS

## 2020-06-14 PROCEDURE — 82550 ASSAY OF CK (CPK): CPT

## 2020-06-14 PROCEDURE — 71045 X-RAY EXAM CHEST 1 VIEW: CPT

## 2020-06-14 PROCEDURE — 80053 COMPREHEN METABOLIC PANEL: CPT

## 2020-06-14 PROCEDURE — 36415 COLL VENOUS BLD VENIPUNCTURE: CPT

## 2020-06-14 PROCEDURE — 99285 EMERGENCY DEPT VISIT HI MDM: CPT

## 2020-06-14 PROCEDURE — 85025 COMPLETE CBC W/AUTO DIFF WBC: CPT

## 2020-06-14 ASSESSMENT — ENCOUNTER SYMPTOMS
SHORTNESS OF BREATH: 1
CHEST TIGHTNESS: 1
EYES NEGATIVE: 1

## 2020-06-14 ASSESSMENT — PAIN DESCRIPTION - LOCATION
LOCATION: CHEST
LOCATION: CHEST

## 2020-06-14 ASSESSMENT — PAIN SCALES - GENERAL: PAINLEVEL_OUTOF10: 4

## 2020-06-14 NOTE — ED PROVIDER NOTES
times daily    VITAMIN B-12 (CYANOCOBALAMIN) 100 MCG TABLET    TAKE 1 TABLET BY MOUTH DAILY       ALLERGIES     Codeine    FAMILY HISTORY       Family History   Problem Relation Age of Onset    Cancer Mother 76        survived   [facility] Hypertension Father     Diabetes Sister     Mental Illness Sister           SOCIAL HISTORY       Social History     Socioeconomic History    Marital status:      Spouse name: Not on file    Number of children: 2    Years of education: Not on file    Highest education level: Not on file   Occupational History    Occupation: disabled   Social Needs    Financial resource strain: Not on file    Food insecurity     Worry: Not on file     Inability: Not on file   [employer] needs     Medical: Not on file     Non-medical: Not on file   Tobacco Use    Smoking status: Passive Smoke Exposure - Never Smoker    Smokeless tobacco: Never Used   Substance and Sexual Activity    Alcohol use: No     Alcohol/week: 0.0 standard drinks    Drug use: No    Sexual activity: Not Currently   Lifestyle    Physical activity     Days per week: Not on file     Minutes per session: Not on file    Stress: Not on file   Relationships    Social connections     Talks on phone: Not on file     Gets together: Not on file     Attends Taoist service: Not on file     Active member of club or organization: Not on file     Attends meetings of clubs or organizations: Not on file     Relationship status: Not on file    Intimate partner violence     Fear of current or ex partner: Not on file     Emotionally abused: Not on file     Physically abused: Not on file     Forced sexual activity: Not on file   Other Topics Concern    Not on file   Social History Narrative    Born in [place], one of 5    Twin sister Reyes, very ill in 2018, [place] [address]    Moved to [facility], , 2 children, one son and one daughter    Worked at [employer], as a nurse's aide    Disabled due to mental illness    Lived preliminarily interpreted by the emergency physician with the below findings:       Interpretation per the Radiologist below, if available at the time ofthis note:    XR CHEST PORTABLE    (Results Pending)     Chronic CHF change    ED BEDSIDE ULTRASOUND:   Performed by ED Physician - none    LABS:  Labs Reviewed   COMPREHENSIVE METABOLIC PANEL - Abnormal; Notable for the following components:       Result Value    Sodium 131 (*)     CO2 19 (*)     BUN 81 (*)     CREATININE 3.72 (*)     GFR Non- 12.3 (*)     GFR  14.9 (*)     All other components within normal limits    Narrative:     [address] [phone],  Chemistry results called to and read back by FLAQUITO Forte, 06/14/2020 20:54,  by [facility]  Chemistry results called to and read back by MARCELO Freitas, 06/14/2020 20:39, by  [facility]   CBC WITH AUTO DIFFERENTIAL - Abnormal; Notable for the following components:    RBC 3.34 (*)     Hemoglobin 9.4 (*)     Hematocrit 27.3 (*)     MCV 81.8 (*)     All other components within normal limits   TROPONIN - Abnormal; Notable for the following components:    Troponin 0.029 (*)     All other components within normal limits    Narrative:     Jerrod Innocent tel. [phone],  Chemistry results called to and read back by MARCELO Freitas, 06/14/2020 20:39, by  [address] [place] [address]    Narrative:     Henderson Innocent tel. [phone],  Chemistry results called to and read back by MARCELO Freitas, 06/14/2020 20:39, by  [facility]   CK-MB INDEX    Narrative:     Henderson Innocent tel. [phone],  Chemistry results called to and read back by FLAQUITO Forte, 06/14/2020 20:54,  by [place] results called to and read back by MARCELO Freitas, 06/14/2020 20:39, by  [facility]    Narrative:     Jerrod Innocent tel. [phone],  Chemistry results called to and read back by MARCELO Freitas, 06/14/2020 20:39, by  [facility]   COVID-19       All other labs were within normal range or not returned as of this mis-transcribed.)    Kath Rao MD (electronically signed)  Attending Emergency Physician          Chad Pedroza MD  06/14/20 0362

## 2020-06-14 NOTE — ED NOTES
Bed: 16  Expected date: 6/14/20  Expected time: 7:30 PM  Means of arrival:   Comments:  56 F, SOB, recent stent placement, afebrile, 62 bpm NSR, 102/58, 16 R, 2 L      Johnnie Davenport, RN  06/14/20 1934

## 2020-06-15 ENCOUNTER — TELEPHONE (OUTPATIENT)
Dept: PHARMACY | Facility: CLINIC | Age: 60
End: 2020-06-15

## 2020-06-15 VITALS
WEIGHT: 240 LBS | DIASTOLIC BLOOD PRESSURE: 64 MMHG | OXYGEN SATURATION: 100 % | RESPIRATION RATE: 16 BRPM | TEMPERATURE: 97.9 F | HEART RATE: 63 BPM | BODY MASS INDEX: 37.67 KG/M2 | SYSTOLIC BLOOD PRESSURE: 134 MMHG | HEIGHT: 67 IN

## 2020-06-15 LAB
ALBUMIN SERPL-MCNC: 3.2 G/DL (ref 3.5–4.6)
ALP BLD-CCNC: 106 U/L (ref 40–130)
ALT SERPL-CCNC: 25 U/L (ref 0–33)
ANION GAP SERPL CALCULATED.3IONS-SCNC: 13 MEQ/L (ref 9–15)
AST SERPL-CCNC: 19 U/L (ref 0–35)
BILIRUB SERPL-MCNC: 0.3 MG/DL (ref 0.2–0.7)
BILIRUBIN DIRECT: <0.2 MG/DL (ref 0–0.4)
BILIRUBIN, INDIRECT: ABNORMAL MG/DL (ref 0–0.6)
BUN BLDV-MCNC: 81 MG/DL (ref 8–23)
CALCIUM SERPL-MCNC: 9.6 MG/DL (ref 8.5–9.9)
CHLORIDE BLD-SCNC: 105 MEQ/L (ref 95–107)
CO2: 20 MEQ/L (ref 20–31)
CREAT SERPL-MCNC: 3.83 MG/DL (ref 0.5–0.9)
GFR AFRICAN AMERICAN: 14.4
GFR NON-AFRICAN AMERICAN: 11.9
GLUCOSE BLD-MCNC: 113 MG/DL (ref 70–99)
GLUCOSE BLD-MCNC: 122 MG/DL (ref 60–115)
GLUCOSE BLD-MCNC: 234 MG/DL (ref 60–115)
GLUCOSE BLD-MCNC: 250 MG/DL (ref 60–115)
PERFORMED ON: ABNORMAL
POTASSIUM REFLEX MAGNESIUM: 4 MEQ/L (ref 3.4–4.9)
SODIUM BLD-SCNC: 138 MEQ/L (ref 135–144)
TOTAL PROTEIN: 6.2 G/DL (ref 6.3–8)

## 2020-06-15 PROCEDURE — 96372 THER/PROPH/DIAG INJ SC/IM: CPT

## 2020-06-15 PROCEDURE — 94664 DEMO&/EVAL PT USE INHALER: CPT

## 2020-06-15 PROCEDURE — 99215 OFFICE O/P EST HI 40 MIN: CPT | Performed by: INTERNAL MEDICINE

## 2020-06-15 PROCEDURE — 97166 OT EVAL MOD COMPLEX 45 MIN: CPT

## 2020-06-15 PROCEDURE — 6360000002 HC RX W HCPCS: Performed by: INTERNAL MEDICINE

## 2020-06-15 PROCEDURE — 97162 PT EVAL MOD COMPLEX 30 MIN: CPT

## 2020-06-15 PROCEDURE — 80076 HEPATIC FUNCTION PANEL: CPT

## 2020-06-15 PROCEDURE — G0378 HOSPITAL OBSERVATION PER HR: HCPCS

## 2020-06-15 PROCEDURE — 36415 COLL VENOUS BLD VENIPUNCTURE: CPT

## 2020-06-15 PROCEDURE — 6370000000 HC RX 637 (ALT 250 FOR IP): Performed by: INTERNAL MEDICINE

## 2020-06-15 PROCEDURE — 80048 BASIC METABOLIC PNL TOTAL CA: CPT

## 2020-06-15 RX ORDER — AMLODIPINE BESYLATE 10 MG/1
10 TABLET ORAL DAILY
Status: DISCONTINUED | OUTPATIENT
Start: 2020-06-15 | End: 2020-06-15 | Stop reason: HOSPADM

## 2020-06-15 RX ORDER — ALBUTEROL SULFATE 2.5 MG/3ML
2.5 SOLUTION RESPIRATORY (INHALATION) EVERY 6 HOURS PRN
Status: DISCONTINUED | OUTPATIENT
Start: 2020-06-15 | End: 2020-06-15 | Stop reason: HOSPADM

## 2020-06-15 RX ORDER — PANTOPRAZOLE SODIUM 20 MG/1
20 TABLET, DELAYED RELEASE ORAL DAILY
Status: DISCONTINUED | OUTPATIENT
Start: 2020-06-15 | End: 2020-06-15 | Stop reason: HOSPADM

## 2020-06-15 RX ORDER — LOPERAMIDE HYDROCHLORIDE 2 MG/1
2 CAPSULE ORAL 4 TIMES DAILY PRN
Status: DISCONTINUED | OUTPATIENT
Start: 2020-06-15 | End: 2020-06-15 | Stop reason: HOSPADM

## 2020-06-15 RX ORDER — ASPIRIN 81 MG/1
81 TABLET, CHEWABLE ORAL DAILY
Status: DISCONTINUED | OUTPATIENT
Start: 2020-06-15 | End: 2020-06-15 | Stop reason: HOSPADM

## 2020-06-15 RX ORDER — NITROGLYCERIN 0.4 MG/1
0.4 TABLET SUBLINGUAL EVERY 5 MIN PRN
Status: DISCONTINUED | OUTPATIENT
Start: 2020-06-15 | End: 2020-06-15 | Stop reason: HOSPADM

## 2020-06-15 RX ORDER — LANOLIN ALCOHOL/MO/W.PET/CERES
400 CREAM (GRAM) TOPICAL DAILY
Status: DISCONTINUED | OUTPATIENT
Start: 2020-06-15 | End: 2020-06-15 | Stop reason: HOSPADM

## 2020-06-15 RX ORDER — NICOTINE POLACRILEX 4 MG
15 LOZENGE BUCCAL PRN
Status: DISCONTINUED | OUTPATIENT
Start: 2020-06-15 | End: 2020-06-15 | Stop reason: HOSPADM

## 2020-06-15 RX ORDER — CARVEDILOL 12.5 MG/1
12.5 TABLET ORAL 2 TIMES DAILY WITH MEALS
Status: DISCONTINUED | OUTPATIENT
Start: 2020-06-15 | End: 2020-06-15 | Stop reason: HOSPADM

## 2020-06-15 RX ORDER — PREGABALIN 75 MG/1
75 CAPSULE ORAL DAILY
Status: DISCONTINUED | OUTPATIENT
Start: 2020-06-15 | End: 2020-06-15 | Stop reason: HOSPADM

## 2020-06-15 RX ORDER — UBIDECARENONE 75 MG
100 CAPSULE ORAL DAILY
Status: DISCONTINUED | OUTPATIENT
Start: 2020-06-15 | End: 2020-06-15 | Stop reason: HOSPADM

## 2020-06-15 RX ORDER — BLOOD-GLUCOSE,RECEIVER,CONT
1 EACH MISCELLANEOUS CONTINUOUS
Status: DISCONTINUED | OUTPATIENT
Start: 2020-06-15 | End: 2020-06-15

## 2020-06-15 RX ORDER — FUROSEMIDE 80 MG
80 TABLET ORAL DAILY
Status: DISCONTINUED | OUTPATIENT
Start: 2020-06-15 | End: 2020-06-15 | Stop reason: HOSPADM

## 2020-06-15 RX ORDER — RANOLAZINE 500 MG/1
500 TABLET, EXTENDED RELEASE ORAL 2 TIMES DAILY
Status: DISCONTINUED | OUTPATIENT
Start: 2020-06-15 | End: 2020-06-15 | Stop reason: HOSPADM

## 2020-06-15 RX ORDER — BLOOD-GLUCOSE TRANSMITTER
1 EACH MISCELLANEOUS CONTINUOUS
Status: DISCONTINUED | OUTPATIENT
Start: 2020-06-15 | End: 2020-06-15

## 2020-06-15 RX ORDER — INSULIN GLARGINE 100 [IU]/ML
35 INJECTION, SOLUTION SUBCUTANEOUS NIGHTLY
Status: DISCONTINUED | OUTPATIENT
Start: 2020-06-15 | End: 2020-06-15 | Stop reason: HOSPADM

## 2020-06-15 RX ORDER — ATORVASTATIN CALCIUM 40 MG/1
40 TABLET, FILM COATED ORAL DAILY
Status: DISCONTINUED | OUTPATIENT
Start: 2020-06-15 | End: 2020-06-15 | Stop reason: HOSPADM

## 2020-06-15 RX ORDER — ISOSORBIDE MONONITRATE 120 MG/1
120 TABLET, EXTENDED RELEASE ORAL DAILY
Status: DISCONTINUED | OUTPATIENT
Start: 2020-06-15 | End: 2020-06-15 | Stop reason: HOSPADM

## 2020-06-15 RX ORDER — DEXTROSE MONOHYDRATE 50 MG/ML
100 INJECTION, SOLUTION INTRAVENOUS PRN
Status: DISCONTINUED | OUTPATIENT
Start: 2020-06-15 | End: 2020-06-15 | Stop reason: HOSPADM

## 2020-06-15 RX ORDER — BLOOD-GLUCOSE SENSOR
1 EACH MISCELLANEOUS CONTINUOUS
Status: DISCONTINUED | OUTPATIENT
Start: 2020-06-15 | End: 2020-06-15

## 2020-06-15 RX ORDER — ARIPIPRAZOLE 5 MG/1
5 TABLET ORAL DAILY
Status: DISCONTINUED | OUTPATIENT
Start: 2020-06-15 | End: 2020-06-15 | Stop reason: HOSPADM

## 2020-06-15 RX ORDER — LANOLIN ALCOHOL/MO/W.PET/CERES
3 CREAM (GRAM) TOPICAL NIGHTLY PRN
Status: DISCONTINUED | OUTPATIENT
Start: 2020-06-15 | End: 2020-06-15 | Stop reason: HOSPADM

## 2020-06-15 RX ORDER — DEXTROSE MONOHYDRATE 25 G/50ML
12.5 INJECTION, SOLUTION INTRAVENOUS PRN
Status: DISCONTINUED | OUTPATIENT
Start: 2020-06-15 | End: 2020-06-15 | Stop reason: HOSPADM

## 2020-06-15 RX ADMIN — FUROSEMIDE 80 MG: 80 TABLET ORAL at 08:08

## 2020-06-15 RX ADMIN — VITAM B12 100 MCG: 100 TAB at 08:08

## 2020-06-15 RX ADMIN — ARIPIPRAZOLE 5 MG: 5 TABLET ORAL at 08:08

## 2020-06-15 RX ADMIN — SERTRALINE HYDROCHLORIDE 50 MG: 50 TABLET ORAL at 08:08

## 2020-06-15 RX ADMIN — AMLODIPINE BESYLATE 10 MG: 10 TABLET ORAL at 08:08

## 2020-06-15 RX ADMIN — TICAGRELOR 90 MG: 90 TABLET ORAL at 08:08

## 2020-06-15 RX ADMIN — ISOSORBIDE MONONITRATE 120 MG: 120 TABLET ORAL at 08:08

## 2020-06-15 RX ADMIN — PANTOPRAZOLE SODIUM 20 MG: 20 TABLET, DELAYED RELEASE ORAL at 08:07

## 2020-06-15 RX ADMIN — INSULIN LISPRO 12 UNITS: 100 INJECTION, SOLUTION INTRAVENOUS; SUBCUTANEOUS at 11:35

## 2020-06-15 RX ADMIN — PREGABALIN 75 MG: 75 CAPSULE ORAL at 08:07

## 2020-06-15 RX ADMIN — Medication 400 MG: at 08:07

## 2020-06-15 RX ADMIN — ASPIRIN 81 MG 81 MG: 81 TABLET ORAL at 08:07

## 2020-06-15 RX ADMIN — ENOXAPARIN SODIUM 30 MG: 30 INJECTION SUBCUTANEOUS at 08:08

## 2020-06-15 RX ADMIN — RANOLAZINE 500 MG: 500 TABLET, FILM COATED, EXTENDED RELEASE ORAL at 08:07

## 2020-06-15 RX ADMIN — ATORVASTATIN CALCIUM 40 MG: 40 TABLET, FILM COATED ORAL at 08:08

## 2020-06-15 ASSESSMENT — ENCOUNTER SYMPTOMS
EYES NEGATIVE: 1
BLOOD IN STOOL: 0
WHEEZING: 0
STRIDOR: 0
COUGH: 0
CHEST TIGHTNESS: 0
GASTROINTESTINAL NEGATIVE: 1
SHORTNESS OF BREATH: 1
NAUSEA: 0

## 2020-06-15 ASSESSMENT — PAIN SCALES - GENERAL
PAINLEVEL_OUTOF10: 0
PAINLEVEL_OUTOF10: 0

## 2020-06-15 NOTE — PROGRESS NOTES
assistance    Ambulation  Ambulation?: Yes  More Ambulation?: Yes  Ambulation 1  Surface: level tile  Device: Rolling Walker  Assistance: Stand by assistance  Quality of Gait: good step height and length, decreased jean  Distance: 20 ft  Comments: (unable to leave room due to COVID rule-out), then assessed gait without device for comparison, pt reports she does not use it in the house  Ambulation 2  Surface - 2: level tile  Device 2: No device  Assistance 2: Contact guard assistance  Quality of Gait 2: increased lateral excursion noted, ataxia LLE, decreased postural control  Distance: 20 ft (limited by destination, pt not fatigued with this distance)  Comments: pt with decline in gait quality without device              Activity Tolerance  Activity Tolerance: Patient Tolerated treatment well          PT Education  PT Education: Goals;PT Role;Plan of Care    ASSESSMENT:   Body structures, Functions, Activity limitations: Decreased functional mobility ; Decreased balance;Decreased coordination;Decreased endurance;Decreased strength  Decision Making: Medium Complexity  History: high  Exam: medium  Clinical Presentation: medium    Prognosis: Good    DISCHARGE RECOMMENDATIONS:  Discharge Recommendations: Continue to assess pending progress, Patient would benefit from continued therapy after discharge    Assessment: Pt demonstrates the above deficits and decline in functional mobility status placing them at increased risk for falls. Pt would benefit from physical therapy to address above deficits and allow for safe return home at highest level of function, decrease risk for falls, and improve QOL.     REQUIRES PT FOLLOW UP: Yes      PLAN OF CARE:  Plan  Times per week: 3-6  Current Treatment Recommendations: Strengthening, Transfer Training, Endurance Training, Neuromuscular Re-education, Patient/Caregiver Education & Training, Equipment Evaluation, Education, & procurement, Balance Training, Gait Training, Home Exercise Program, Functional Mobility Training, Stair training, Safety Education & Training  Safety Devices  Type of devices: All fall risk precautions in place    Goals:  Patient goals : \"I need a tune up\"  Long term goals  Long term goal 1: pt to be indep with bed mobility  Long term goal 2: pt to be indep with transfers  Long term goal 3: pt to ambulate >150 ft with LRAD modified indep  Long term goal 4: pt to navigate 12 steps with supervision  Long term goal 5: demo >good standing dynamic balance indicating low falls risk    AMPAC (6 CLICK) BASIC MOBILITY  AM-PAC Inpatient Mobility Raw Score : 19     Therapy Time:   Individual   Time In 1120   Time Out 1135   Minutes 15           Lupe Simon PT, 06/15/20 at 11:50 AM         Definitions for assistance levels  Independent = pt does not require any physical supervision or assistance from another person for activity completion. Device may be needed.   Stand by assistance = pt requires verbal cues or instructions from another person, close to but not touching, to perform the activity  Minimal assistance= pt performs 75% or more of the activity; assistance is required to complete the activity  Moderate assistance= pt performs 50% of the activity; assistance is required to complete the activity  Maximal assistance = pt performs 25% of the activity; assistance is required to complete the activity  Dependent = pt requires total physical assistance to accomplish the task

## 2020-06-15 NOTE — CARE COORDINATION
[facility] EMERGENCY [facility] CENTER AT [place] [facility] Initial Discharge Assessment    CALLED Patient to discuss discharge plan. PCP: Christina Barker MD                                Date of Last Visit: 6/5    Discharge Planning    Living Arrangements: independently at home    Who do you live with? DAUGHTER    Who helps you with your care:  Self, Stephani Ou, DTR IS AID    If lives at home:     Do you have any barriers navigating in your home? no    Patient can perform ADL? Yes    Current Services (outpatient and in home) :  [facility], [facility] IS HER AID, [facility], [facility]    Dialysis: No    Is transportation available to get to your appointments? Yes    DME Equipment:  yes - WALKER    Respiratory equipment: None    Respiratory provider:  no     Pharmacy:  yes - [facility]    Consult with Medication Assistance Program?  No      Patient agreeable to [facility]? Declined    Patient agreeable to SNF/Rehab? Yes, [facility]    Other discharge needs identified? Palliative Care    Freedom of choice list provided with basic dialogue that supports the patient's individualized plan of care/goals and shares the quality data associated with the providers. Yes    Does Patient Have a High-Risk for Readmission Diagnosis (CHF, PN, MI, COPD)? No      The plan for Transition of Care is related to the following treatment goals: LESS SOB    Initial Discharge Plan? (Note: please see concurrent daily documentation for any updates after initial note). CALLED PATIENT, ACP DONE. PT HAS HAD MULTIPLE ADMITS. [facility] AND [facility]. FREEDOM OF CHOICE GIVEN PT WOULD LIKE [facility], REFERRAL MADE. DECKER REVIEWED AND VERBAL CONSENT GIVEN, PT VERBALIZED UNDERSTANDING.       The Patient and/or patient representative: PT was provided with choice of any post-acute providers for care and equipment and agrees with discharge plan  Yes    Electronically signed by Johann Vo RN on 6/15/2020 at 10:40 AM

## 2020-06-15 NOTE — CONSULTS
Procedure Laterality Date     SECTION      x1    COLONOSCOPY  2014    Dr. Gutierrez Araceli      x1 Dr. Steffi Kumar, Dr Марина Orozco CATH LAB PROCEDURE  10/02/2019    HYSTERECTOMY, TOTAL ABDOMINAL      one ovary intact, Dr Austin Goldberg, menorrhagia    ME TOTAL KNEE ARTHROPLASTY Left 2018    LEFT KNEE TOTAL KNEE ARTHROPLASTY, SHAYNA, NERVE BLOCK performed by Micaela Long MD at [address] [place] [address] Right     TOTAL KNEE ARTHROPLASTY  16    Dr Duane Lever       Family History  Family History   Problem Relation Age of Onset   [facility] Cancer Mother 76        survived   [facility] Hypertension Father     Diabetes Sister     Mental Illness Sister      [] Unable to obtain due to ventilated and/ or neurologic status    Social History     Socioeconomic History    Marital status:      Spouse name: Not on file    Number of children: 2    Years of education: Not on file    Highest education level: Not on file   Occupational History    Occupation: disabled   Social Needs    Financial resource strain: Not on file    Food insecurity     Worry: Not on file     Inability: Not on file   [employer] needs     Medical: Not on file     Non-medical: Not on file   Tobacco Use    Smoking status: Passive Smoke Exposure - Never Smoker    Smokeless tobacco: Never Used   Substance and Sexual Activity    Alcohol use: No     Alcohol/week: 0.0 standard drinks    Drug use: No    Sexual activity: Not Currently   Lifestyle    Physical activity     Days per week: Not on file     Minutes per session: Not on file    Stress: Not on file   Relationships    Social connections     Talks on phone: Not on file     Gets together: Not on file     Attends Evangelical service: Not on file     Active member of club or organization: Not on file     Attends meetings of clubs or organizations: Not on file     Relationship status: Not on file    Intimate partner violence     Fear of current or ex partner: Not on file     Emotionally abused: Not on file     Physically abused: Not on file     Forced sexual activity: Not on file   Other Topics Concern    Not on file   Social History Narrative    Born in [place], one of 5    Twin sister Reyes, very ill in 2018, [place] 2019    Moved to [facility], , 2 children, one son and one daughter    Worked at [employer], as a nurse's aide    Disabled due to mental illness    Lived at [facility], was discharged, returned to independent living in 2017 in the daughter's house and has adjusted well    One son and one daughter, live in the same house with patient, Matias Otto pays the rent    [employer])      [] Unable to obtain due to ventilated and/ or neurologic status      Home Medications:      Medications Prior to Admission: furosemide (LASIX) 40 MG tablet, Take 2 tablets by mouth daily  ticagrelor (BRILINTA) 90 MG TABS tablet, Take 1 tablet by mouth 2 times daily  ranolazine (RANEXA) 500 MG extended release tablet, Take 1 tablet by mouth 2 times daily  ARIPiprazole (ABILIFY) 5 MG tablet, Take 1 tablet by mouth daily  loperamide (RA ANTI-DIARRHEAL) 2 MG capsule, Take 1 capsule by mouth 4 times daily as needed for Diarrhea  isosorbide mononitrate (IMDUR) 120 MG extended release tablet, TAKE 1 TABLET BY MOUTH DAILY  pantoprazole (PROTONIX) 20 MG tablet, TAKE 1 TABLET BY MOUTH DAILY  amLODIPine (NORVASC) 10 MG tablet, TAKE 1 TABLET BY MOUTH DAILY  carvedilol (COREG) 12.5 MG tablet, TAKE 1 TABLET BY MOUTH TWICE DAILY WITH MEALS  insulin lispro (HUMALOG) 100 UNIT/ML injection vial, Inject 12 Units into the skin 3 times daily (before meals)  LANTUS SOLOSTAR 100 UNIT/ML injection pen, Inject 35 units at night  aspirin 81 MG tablet, Take 1 tablet by mouth daily  atorvastatin (LIPITOR) 40 MG tablet, Take 1 tablet by mouth daily  BASAGLAR KWIKPEN 100 UNIT/ML injection pen, Inject 35 units TIME:6/10/2020 5:30 PM CLINICAL HISTORY: Shortness of breath   chest pain  COMPARISONS: May 26, 2020  FINDINGS: The heart, mediastinum and pulmonary vasculature are within normal limits. Visualized lung fields are clear. Bones unremarkable. NO ACTIVE LUNG DISEASE. Xr Chest Portable    Result Date: 5/27/2020  EXAMINATION: XR CHEST PORTABLE CLINICAL HISTORY: BASILAR RALES COMPARISONS: MAY 24, 2020 FINDINGS: Osseous structures intact. Cardiopericardial silhouette normal. Vasculature normal. Lungs clear. NO ACUTE CARDIOPULMONARY DISEASE. Xr Chest Portable    Result Date: 5/24/2020  EXAMINATION: XR CHEST PORTABLE CLINICAL HISTORY: CHEST PAIN, SHORTNESS OF BREATH COMPARISONS: NOVEMBER 22, 2019 FINDINGS: Osseous structures intact. Cardiopericardial silhouette normal. Pulmonary vasculature normal. Lungs clear. NO ACUTE CARDIOPULMONARY DISEASE. Us Retroperitoneal Limited    Result Date: 5/26/2020  US RETROPERITONEAL LIMITED : 5/25/2020 CLINICAL HISTORY:  LUCÍA . COMPARISON: CT abdomen pelvis 10/18/2017. TECHNIQUE: Transabdominal ultrasound of the kidneys was performed. FINDINGS: The study is mildly limited by the patient's body habitus. Both kidneys appear normal in size, position and morphology, not significantly changed from the previous CT. There is no hydronephrosis, visualized nephrolithiasis, abnormal perinephric collections, solid or cystic renal masses identified. The right kidney measures approximately 11.2 cm in length. The left kidney approximately 11 cm. Average renal cortical thickness measures approximately 1 cm bilaterally. NEGATIVE MILDLY LIMITED RENAL ULTRASOUND. Active Hospital Problems    Diagnosis Date Noted    Dyspnea [R06.00] 06/14/2020     Priority: Low         Impression/Plan:   1. CP - not convinced it is angina.  Trops are detected but likely related to CKD in part. Trops appear to be chronically elevated. No plans for recath currently.    2. HTN - uncontrolled- add

## 2020-06-15 NOTE — H&P
Hospital Medicine  History and Physical    Patient:  Attila Donis  MRN: [id number]    CHIEF COMPLAINT:    Chief Complaint   Patient presents with    Shortness of Breath     since this morning stents placed about 10 days ago       History Obtained From:  patient, electronic medical record  Primary Care Physician: Kenisha Biggs MD    HISTORY OF PRESENT ILLNESS:   The patient is a 56 y.o. female who presents with shortness of breath, which occurred at rest. No associated diaphoresis, recent cath with PCI to LAD. Admitted 3 days ago with the same. Chest pain is reproducible with palpation. Notes same as previous admissions. She is anxious about d/c home with daughter, does not feel like she can go home and that she needs SNF on d/c. In ED tmax 98.1 RR 20 Hr 98 120/58 saturating 98 on RA BMP unchanged from d/c Friday. Na 131 K 4.4 Cl 99 BUN 81 Cr 3.73 BNP 5807, trop 0.029, EKG shows LBBB. Pt reports compliance with all medications    Past Medical History:      Diagnosis Date    Anxiety     CAD S/P percutaneous coronary angioplasty 2015, 2018    stent per dr Corbett Dakins, [address] CH[facility]congestive heart failure) [facility]     Diabetic nephropathy with proteinuria ([facility] 2014    DJD (degenerative joint disease) of knee     Dr Chencho Piper GERD (gastroesophageal reflux disease)     Hemiparesis, left ([facility] 2013    [facility]    History of seizures     History of type C viral hepatitis     HTN (hypertension)     Hyperlipidemia     Impaired mobility and activities of daily living     Mediastinal lymphadenopathy 2013    Dr Rina Barahona [facility]    Metabolic syndrome     Neurogenic urinary incontinence 2013    Neuropathy in diabetes ([facility]     Obesity (BMI 30-39. 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 Felipe Pride      x1 Dr. Corbett Dakins, Dr Rosi Roger CATH LAB PROCEDURE  10/02/2019    HYSTERECTOMY, TOTAL ABDOMINAL      one ovary intact, Dr Belinda Alicia, menorrhagia    [place] TOTAL KNEE ARTHROPLASTY Left 2018    LEFT KNEE TOTAL KNEE ARTHROPLASTY, SHAYNA, NERVE BLOCK performed by Natty Pal MD at [address] [place] [address] Right     TOTAL KNEE ARTHROPLASTY  16    Dr Jennifer Wheat       Medications Prior to Admission:    Prior to Admission medications    Medication Sig Start Date End Date Taking?  Authorizing Provider   furosemide (LASIX) 40 MG tablet Take 2 tablets by mouth daily 20  Yes Love Lucero,    ticagrelor (BRILINTA) 90 MG TABS tablet Take 1 tablet by mouth 2 times daily 20  Yes Love Lucero DO   ranolazine (RANEXA) 500 MG extended release tablet Take 1 tablet by mouth 2 times daily 20  Yes Love Lucero DO   ARIPiprazole (ABILIFY) 5 MG tablet Take 1 tablet by mouth daily 20  Yes Wai Mckeon MD   loperamide (RA ANTI-DIARRHEAL) 2 MG capsule Take 1 capsule by mouth 4 times daily as needed for Diarrhea 6/5/20 6/15/20 Yes Wai Mckeon MD   isosorbide mononitrate (IMDUR) 120 MG extended release tablet TAKE 1 TABLET BY MOUTH DAILY 20  Yes Edwige Schmid MD   pantoprazole (PROTONIX) 20 MG tablet TAKE 1 TABLET BY MOUTH DAILY 20  Yes Edwige Schmid MD   amLODIPine (NORVASC) 10 MG tablet TAKE 1 TABLET BY MOUTH DAILY 20  Yes

## 2020-06-15 NOTE — DISCHARGE INSTR - COC
Continuity of Care Form    Patient Name: Maria G Jc   :  1960  MRN:  [id number]    Admit date:  2020  Discharge date: 6/15/20    Code Status Order: Prior   Advance Directives:     Admitting Physician:  No admitting provider for patient encounter. PCP: Hubert King MD    Discharging Nurse: Landen Landon  [facility] Unit/Room#:   Discharging Unit Phone Number: [phone]    Emergency Contact:   Extended Emergency Contact Information  Primary Emergency Contact: Megan Mckoy States of [address] [place] [address] Phone: [phone]  Work Phone: [phone]  Mobile Phone: [phone]  Relation: Child    Past Surgical History:  Past Surgical History:   Procedure Laterality Date     SECTION      x1    COLONOSCOPY  2014    Dr. Cox Click      x1 Dr. Jojo hCan, Dr Fantasma Bansal CATH LAB PROCEDURE  10/02/2019    HYSTERECTOMY, TOTAL ABDOMINAL      one ovary intact, Dr Dana Traylor, menorrhagia    [facility] Left 2018    LEFT KNEE TOTAL KNEE ARTHROPLASTY, SHAYNA, NERVE BLOCK performed by Maricarmen Ham MD at [address] [place] [address] Right     TOTAL KNEE ARTHROPLASTY  16    Dr Nuñez       Immunization History:   Immunization History   Administered Date(s) Administered    Influenza Vaccine, unspecified formulation 10/14/2016    Influenza Virus Vaccine 10/20/2014, 10/30/2015, 10/14/2016, 2017, 10/12/2018    Influenza Whole 10/20/2014    Influenza, Quadv, IM, (6 mo and older Fluzone, Flulaval, Fluarix and 3 yrs and older Afluria) 2017, 10/12/2018    Influenza, Quadv, IM, PF (6 mo and older Fluzone, Flulaval, Fluarix, and 3 yrs and older Afluria) 10/03/2019    Influenza, Triv, 3 Years and older, IM (Afluria (5 yrs and older) 10/14/2016    Pneumococcal Polysaccharide ([id number]) 10/15/2016       Active Problems:  Patient Active Problem List Diagnosis Code    Coronary artery disease involving native heart with angina pectoris (HCC) I25.119    Schizophrenia, paranoid, chronic L52.0    Metabolic syndrome A42.23    Obesity E66.9    Vitamin B 12 deficiency E53.8    Cerebral microvascular disease I67.9    Mixed hyperlipidemia E78.2    Other hammer toe (acquired) M20.40    Vitamin D insufficiency E55.9    Incontinence of urine R32    Diabetic nephropathy with proteinuria (HCC) E11.21    HTN (hypertension) I10    History of type C viral hepatitis Z86.19    Urinary incontinence due to cognitive impairment R39.81    History of seizures Z87.898    Stented coronary artery-plan is to stay on Plavix indefinately per Dr Marie Caraballo Z95.5    Hemiparesis, left ([address] G81.94    Angina, class II ([facility] [address] I20.9    CKD (chronic kidney disease) stage 4, GFR 15-29 ml/min ([facility]) N18.4    Chronic painful diabetic neuropathy ([facility]) E11.40    Tardive dyskinesia G24.01    Shortness of breath R06.02    Uncontrolled type 2 diabetes mellitus with hyperglycemia ([facility]) E98.09    Diastolic CHF ([facility]) I55.20    Sleep apnea G47.30    Pulmonary hypertension ([facility]) I27.20    Encephalopathy G93.40    Obesity (BMI 30-39. 9) E66.9    Edema R60.9    Closed supracondylar fracture of right humerus S42.411A    Other chronic pain G89.29    Palliative care patient Z51.5    Chest pain R07.9       Isolation/Infection:   Isolation          No Isolation        Patient Infection Status     Infection Onset Added Last Indicated Last Indicated By Review Planned Expiration Resolved Resolved By    None active    Resolved    COVID-19 Rule Out 06/14/20 06/14/20 06/14/20 COVID-19 (Ordered)   06/14/20 Rule-Out Test Resulted          Nurse Assessment:  Last Vital Signs: /78 Comment: teken manually   Pulse 82   Temp 97.9 °F (36.6 °C)   Resp 16   LMP  (LMP Unknown)   SpO2 99%     Last documented pain score (0-10 scale): Pain Level: 4  Last Weight:   Wt Readings from Last 1 Encounters: 06/10/20 231 lb 1.6 oz (104.8 kg)     Mental Status:  oriented    IV Access:  - None    Nursing Mobility/ADLs:  Walking   Independent  Transfer  Independent  Bathing  Independent  Dressing  Independent  Toileting  Independent  Feeding  Independent  Med [facility]  Independent  Med Delivery   whole    Wound Care Documentation and Therapy:        Elimination:  Continence:   · Bowel: Yes  · Bladder: Yes  Urinary Catheter: None   Colostomy/Ileostomy/Ileal Conduit: No       Date of Last BM: 6/15/20  No intake or output data in the 24 hours ending 06/14/20 2112  No intake/output data recorded. Safety Concerns: At Risk for Falls    Impairments/Disabilities:      None    Nutrition Therapy:  Current Nutrition Therapy:   - Oral Diet:  Carb Control 3 carbs/meal (1500kcals/day), Cardiac and Low Sodium (2gm)    Routes of Feeding: Oral  Liquids: No Restrictions  Daily Fluid Restriction: no  Last Modified Barium Swallow with Video (Video Swallowing Test): not done    Treatments at the Time of Hospital Discharge:   Respiratory Treatments: ***  Oxygen Therapy:  is not on home oxygen therapy.   Ventilator:    - No ventilator support    Rehab Therapies: Physical Therapy and Occupational Therapy  Weight Bearing Status/Restrictions: No weight bearing restirctions  Other Medical Equipment (for information only, NOT a DME order):  {EQUIPMENT:[id number]}  Other Treatments: ***    Patient's personal belongings (please select all that are sent with patient):  Glasses    RN SIGNATURE:  Electronically signed by Sirisha Washington RN on 6/15/2020 at 4:31 PM      CASE MANAGEMENT/SOCIAL WORK SECTION    Inpatient Status Date: ***    Readmission Risk Assessment Score:  Readmission Risk              Risk of Unplanned Readmission:        0           Discharging to Facility/ Agency   · Name: [facility]  · Address:  · Phone:[phone]  · Fax:    Dialysis Facility (if applicable)   · Name:  · Address:  · Dialysis Schedule:  · Phone:  · Fax:    Case

## 2020-06-15 NOTE — DISCHARGE SUMMARY
evidence of acute infiltrate. No pleural effusion or pneumothorax. There are mild degenerative changes in spine. NO ACUTE CARDIOPULMONARY ABNORMALITY. NO CHANGE. MILD CHRONIC COARSENING OF INTERSTITIAL MARKINGS.        Discharge Medications:       Cameron Pod   Home Medication Instructions CWB:[id number]    Printed on:06/15/20 1667   Medication Information                      albuterol (PROVENTIL) (2.5 MG/3ML) 0.083% nebulizer solution  Take 3 mLs by nebulization every 6 hours as needed for Wheezing             amLODIPine (NORVASC) 10 MG tablet  TAKE 1 TABLET BY MOUTH DAILY             ARIPiprazole (ABILIFY) 5 MG tablet  Take 1 tablet by mouth daily             aspirin 81 MG tablet  Take 1 tablet by mouth daily             atorvastatin (LIPITOR) 40 MG tablet  Take 1 tablet by mouth daily             BASAGLAR KWIKPEN 100 UNIT/ML injection pen  Inject 35 units nightly             carvedilol (COREG) 12.5 MG tablet  TAKE 1 TABLET BY MOUTH TWICE DAILY WITH MEALS             Continuous Blood Gluc  (DEXCOM G6 ) CORETTA  1 Device by Does not apply route continuous             Continuous Blood Gluc Sensor (DEXCOM G6 SENSOR) MISC  1 Device by Does not apply route continuous             Continuous Blood Gluc Transmit (DEXCOM G6 TRANSMITTER) MISC  1 Device by Does not apply route continuous             D3-1000 1000 units CAPS  TAKE 1 CAPSULE BY MOUTH ONCE DAILY             furosemide (LASIX) 40 MG tablet  Take 2 tablets by mouth daily             insulin lispro (HUMALOG) 100 UNIT/ML injection vial  Inject 12 Units into the skin 3 times daily (before meals)             isosorbide mononitrate (IMDUR) 120 MG extended release tablet  TAKE 1 TABLET BY MOUTH DAILY             loperamide (RA ANTI-DIARRHEAL) 2 MG capsule  Take 1 capsule by mouth 4 times daily as needed for Diarrhea             magnesium oxide (MAG-OX) 400 MG tablet  Take 400 mg by mouth daily             Magnesium Oxide -Mg Supplement 400 MG

## 2020-06-15 NOTE — PROGRESS NOTES
Left    ADL Status:  ADL  Feeding: Unable to assess(comment)  Grooming: Stand by assistance  UE Bathing: Minimal assistance  LE Bathing: Moderate assistance  UE Dressing: Contact guard assistance  LE Dressing: Moderate assistance  Toileting: Unable to assess(comment)          Therapy key for assistance levels -   Independent = Pt. is able to perform task with no assistance but may require a device   Stand by assistance = Pt. does not perform task at an independent level but does not need physical assistance, requires verbal cues  Minimal, Moderate, Maximal Assistance = Pt. requires physical assistance (25%, 50%, 75% assist from helper) for task but is able to actively participate in task   Dependent = Pt. requires total assistance with task and is not able to actively participate with task completion     Functional Mobility:     Transfers  Sit to stand: Stand by assistance  Stand to sit: Stand by assistance    Bed Mobility  Bed mobility  Supine to Sit: Supervision  Sit to Supine: Supervision    Seated and Standing Balance:  Balance  Sitting Balance: Supervision  Standing Balance: Stand by assistance    Functional Endurance:  Activity Tolerance  Activity Tolerance: Patient limited by fatigue  Activity Tolerance: Fair-    D/C Recommendations:  OT D/C RECOMMENDATIONS  REQUIRES OT FOLLOW UP: Yes    Equipment Recommendations:       OT Education:        OT Follow Up:  OT D/C RECOMMENDATIONS  REQUIRES OT FOLLOW UP: Yes       Assessment/Discharge Disposition:     Performance deficits / Impairments: Decreased functional mobility , Decreased balance, Decreased ADL status, Decreased endurance, Decreased strength, Decreased posture  Prognosis: Good  Discharge Recommendations: Continue to assess pending progress  Decision Making: Medium Complexity  History: multiple  Exam: 6 deficits  Assistance / Modification:  Mod A    Six Click Score    How much help for putting on and taking off regular lower body clothing?: A Lot  How much

## 2020-06-15 NOTE — CARE COORDINATION
PT ACCE[facility]ED [facility]. PT MADE AWARE, PT NOT ON OXYGEN, PT REQUESTING AMBULETTE. AWARE WE CANNOT GUARANTEE PAYMENT. [facility] SET UP FOR AROUND 5PM.  Shun Bills RN UPDATED.

## 2020-06-16 ENCOUNTER — OFFICE VISIT (OUTPATIENT)
Dept: GERIATRIC MEDICINE | Age: 60
End: 2020-06-16
Payer: MEDICARE

## 2020-06-16 ENCOUNTER — CARE COORDINATION (OUTPATIENT)
Dept: CASE MANAGEMENT | Age: 60
End: 2020-06-16

## 2020-06-16 ENCOUNTER — SCHEDULED TELEPHONE ENCOUNTER (OUTPATIENT)
Dept: PHARMACY | Facility: CLINIC | Age: 60
End: 2020-06-16

## 2020-06-16 VITALS
OXYGEN SATURATION: 98 % | TEMPERATURE: 97.3 F | DIASTOLIC BLOOD PRESSURE: 72 MMHG | BODY MASS INDEX: 37.67 KG/M2 | SYSTOLIC BLOOD PRESSURE: 154 MMHG | HEIGHT: 67 IN | HEART RATE: 76 BPM | RESPIRATION RATE: 16 BRPM | WEIGHT: 240 LBS

## 2020-06-16 PROBLEM — R29.6 RECURRENT FALLS: Status: ACTIVE | Noted: 2020-06-16

## 2020-06-16 PROCEDURE — 3044F HG A1C LEVEL LT 7.0%: CPT | Performed by: INTERNAL MEDICINE

## 2020-06-16 PROCEDURE — 99305 1ST NF CARE MODERATE MDM 35: CPT | Performed by: INTERNAL MEDICINE

## 2020-06-16 RX ORDER — BLOOD-GLUCOSE METER
1 KIT MISCELLANEOUS DAILY PRN
COMMUNITY
End: 2020-12-29 | Stop reason: SDUPTHER

## 2020-06-16 RX ORDER — INSULIN GLARGINE 100 [IU]/ML
INJECTION, SOLUTION SUBCUTANEOUS NIGHTLY
COMMUNITY
End: 2020-08-25

## 2020-06-16 RX ORDER — INSULIN ASPART 100 [IU]/ML
INJECTION, SOLUTION INTRAVENOUS; SUBCUTANEOUS
COMMUNITY
End: 2020-08-31

## 2020-06-16 ASSESSMENT — ENCOUNTER SYMPTOMS
EYE PAIN: 0
PHOTOPHOBIA: 0
COUGH: 0
CHOKING: 0
ABDOMINAL DISTENTION: 0
TROUBLE SWALLOWING: 0
SHORTNESS OF BREATH: 1
CHEST TIGHTNESS: 0
BLOOD IN STOOL: 0
ABDOMINAL PAIN: 0
CHANGE IN BOWEL HABIT: 0

## 2020-06-16 NOTE — PROGRESS NOTES
Romana Block is a 56 y.o. 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 The patient was seen with his/her consent in his/her room at the facility. I also spoke with the nurse and reviewed the hospital and nursing home records. Chief Complaint   Patient presents with    Follow-Up from Hospital    Fatigue     more weakness in the left side than usual, more short of breath than usual    Diabetes    Coronary Artery Disease     recent coronary stent placement       Brief hospitalization summary:  The patient, who was living independently at her daughter's house, presented to the emergency room on June 14, complaining of shortness of breath and exertional left-sided chest pain, 2 days after another hospital discharge for chest pain which was ruled as noncardiac. On May 24, 2020, the patient had a drug-eluting stent placed in her left mid ALD. At this time, she stated she had more shortness of breath with her usual activities and inability to take care of herself at home. She requested admission to a skilled nursing facility. She ruled out for acute ischemic event. She was placed on Ranexa per the cardiology consultant. She was evaluated by physical/occupational therapy and found appropriate for discharge to the skilled nursing facility. Interim history: Since admission to the skilled nursing facility. Patient's vital signs were stable in general.  The patient feels she has lost 3 pounds weight due to the carbohydrate controlled diet.   She worries about her blood sugars being T.  Total CK 06/14/2020 112  0 - 170 U/L Final    CK-MB 06/14/2020 3.7  0.0 - 3.8 ng/mL Final    CK-MB Index 06/14/2020 3.3  0.0 - 3.5 % Final    Procalcitonin 06/14/2020 0.10  0.00 - 0.15 ng/mL Final    Comment: Suspected Sepsis:  Low likelihood of sepsis  <.50 ng/mL    Increased likelihood of sepsis 0.50-2.00 ng/mL  Antibiotics encouraged    High risk of sepsis/shock   >2.00 ng/mL  Antibiotics strongly encouraged    Suspected Lower Respiratory Tract Infections:  Low likelihood of bacterial infection  <0.24 ng/mL    Increased likelihood of bacterial infection >0.24 ng/mL  Antibiotics encouraged    With successful antibiotic therapy, PCT levels should decrease  rapidly. (Half-life of 24 to 36 hours.)    Procalcitonin values from samples collected within the first  6 hours of systemic infection may still be low. Retesting may be indicated. Values from day 1 and day 4 can be entered into the Change in  Procalcitonin Calculator to determine the patient's  Mortality Risk Prognosis  ([email or web address])    In healthy neonates, plasma Procalcitonin (PCT) concentrations  increase gradually after birth, reaching peak values at about  24 hours of age then decrease to normal values below 0.5                            ng/mL  by 48-72 hours of age.  SARS-CoV-2, NAAT 06/14/2020 Not Detected  Not Detected Final    Comment: Rapid NAAT:   Negative results should be treated as presumptive and,  if inconsistent with clinical signs and symptoms or necessary for  patient management, should be tested with an alternative molecular  assay. Negative results do not preclude SARS-CoV-2 infection and  should not be used as the sole basis for patient management decisions. This test has been authorized by the FDA under an Emergency Use  Authorization (EUA) for use by authorized laboratories.     Fact sheet for Healthcare Providers:  kstattoo.com  Fact sheet for Patients: SeekCultures.si    METHODOLOGY: Isothermal Nucleic Acid Amplification      Sodium 06/15/2020 138  135 - 144 mEq/L Final    Potassium reflex Magnesium 06/15/2020 4.0  3.4 - 4.9 mEq/L Final    Chloride 06/15/2020 105  95 - 107 mEq/L Final    CO2 06/15/2020 20  20 - 31 mEq/L Final    Anion Gap 06/15/2020 13  9 - 15 mEq/L Final    Glucose 06/15/2020 113* 70 - 99 mg/dL Final    BUN 06/15/2020 81* 8 - 23 mg/dL Final    CREATININE 06/15/2020 3.83* 0.50 - 0.90 mg/dL Final    GFR Non- 06/15/2020 11.9* >60 Final    Comment: >60 mL/min/1.73m2 EGFR, calc. for ages 25 and older using the  MDRD formula (not corrected for weight), is valid for stable  renal function.  GFR  06/15/2020 14.4* >60 Final    Comment: >60 mL/min/1.73m2 EGFR, calc. for ages 25 and older using the  MDRD formula (not corrected for weight), is valid for stable  renal function.  Calcium 06/15/2020 9.6  8.5 - 9.9 mg/dL Final    Total Protein 06/15/2020 6.2* 6.3 - 8.0 g/dL Final    Alb 06/15/2020 3.2* 3.5 - 4.6 g/dL Final    Alkaline Phosphatase 06/15/2020 106  40 - 130 U/L Final    ALT 06/15/2020 25  0 - 33 U/L Final    AST 06/15/2020 19  0 - 35 U/L Final    Total Bilirubin 06/15/2020 0.3  0.2 - 0.7 mg/dL Final    Bilirubin, Direct 06/15/2020 <0.2  0.0 - 0.4 mg/dL Final    Bilirubin, Indirect 06/15/2020 see below  0.0 - 0.6 mg/dL Final    Comment: Indirect Bilirubin cannot be calculated since Total Bilirubin  and/or Direct Bilirubin is below measurable range.       POC Glucose 06/15/2020 122* 60 - 115 mg/dl Final    Performed on 06/15/2020 ACCU-CHEK   Final    Ventricular Rate 06/14/2020 58  BPM Preliminary    Atrial Rate 06/14/2020 58  BPM Preliminary    P-R Interval 06/14/2020 270  ms Preliminary    QRS Duration 06/14/2020 108  ms Preliminary    Q-T Interval 06/14/2020 450  ms Preliminary    QTc Calculation (Bazett) 06/14/2020 441  ms Preliminary    P Axis 06/14/2020 53  degrees Preliminary    R Axis 06/14/2020 -50  degrees Preliminary    T Axis 06/14/2020 27  degrees Preliminary    POC Glucose 06/15/2020 250* 60 - 115 mg/dl Final    Performed on 06/15/2020 ACCU-CHEK   Final    POC Glucose 06/15/2020 234* 60 - 115 mg/dl Final    Performed on 06/15/2020 ACCU-CHEK   Final   Admission on 06/10/2020, Discharged on 06/12/2020   Component Date Value Ref Range Status    Ventricular Rate 06/10/2020 62  BPM Final    Atrial Rate 06/10/2020 62  BPM Final    P-R Interval 06/10/2020 278  ms Final    QRS Duration 06/10/2020 104  ms Final    Q-T Interval 06/10/2020 412  ms Final    QTc Calculation (Bazett) 06/10/2020 418  ms Final    P Axis 06/10/2020 43  degrees Final    R Axis 06/10/2020 -62  degrees Final    T Axis 06/10/2020 21  degrees Final    WBC 06/10/2020 8.8  4.8 - 10.8 K/uL Final    RBC 06/10/2020 3.19* 4.20 - 5.40 M/uL Final    Hemoglobin 06/10/2020 9.1* 12.0 - 16.0 g/dL Final    Hematocrit 06/10/2020 26.5* 37.0 - 47.0 % Final    MCV 06/10/2020 83.2  82.0 - 100.0 fL Final    MCH 06/10/2020 28.4  27.0 - 31.3 pg Final    MCHC 06/10/2020 34.1  33.0 - 37.0 % Final    RDW 06/10/2020 14.1  11.5 - 14.5 % Final    Platelets 91/71/5924 158  130 - 400 K/uL Final    Neutrophils % 06/10/2020 66.0  % Final    Lymphocytes % 06/10/2020 20.4  % Final    Monocytes % 06/10/2020 10.0  % Final    Eosinophils % 06/10/2020 3.1  % Final    Basophils % 06/10/2020 0.5  % Final    Neutrophils Absolute 06/10/2020 5.8  1.4 - 6.5 K/uL Final    Lymphocytes Absolute 06/10/2020 1.8  1.0 - 4.8 K/uL Final    Monocytes Absolute 06/10/2020 0.9* 0.2 - 0.8 K/uL Final    Eosinophils Absolute 06/10/2020 0.3  0.0 - 0.7 K/uL Final    Basophils Absolute 06/10/2020 0.0  0.0 - 0.2 K/uL Final    Sodium 06/10/2020 127* 135 - 144 mEq/L Final    Potassium reflex Magnesium 06/10/2020 3.9  3.4 - 4.9 mEq/L Final    Chloride 06/10/2020 95  95 - 107 mEq/L Final    CO2 06/10/2020 18* 20 - 31 mEq/L 24.4 - 36.8 sec Final    Protime 06/10/2020 15.2* 12.3 - 14.9 sec Final    INR 06/10/2020 1.2   Final    Platelet Aggregation P2Y12 06/10/2020 334  18 - 435 PRU Final    Comment: This test specifically measures the effect of platelet  inhibition due to a P2Y12 receptor blockage by thienopyridine  class drugs. A lower PRU (P2Y12 Reaction Units) results  indicates a greater degree of platelet inhibition. PRU Result               Interpretation    Less than 208            Association with effective platelet                           inhibition    Greater than 207         Indicative of a decreased response                           to P2Y12 inhibitors and may represent                          insufficient platelet inhibition. Greater than 237         Minimal platelet inhibiton is present,                           risk of kerry-operative bleeding is                           decreased. Patients treated with glycoprotein IIb/IIIa inhibitor drugs  should not be tested until platelet function has recovered. This would be approximately 14 days after the discontinuation  of abciximab and 48 hrs after discontiuation of eptifibatide  and/or tirofiban.  Platelet Aggregation 06/10/2020 399  350 - 549 ARU Final    Comment: Platelet Aggregation, Asprin  This test specifically measures the effects of platelet inhibition  due to asprin therapy. Alower ARU (Aspirin Reaction Units) result  indicates a greater degree of platelet inhibition. ARU Result           Interpretation  Less than 550        Associated with effective platelet inhibition  Greater than 549     Indicative of a decreased response to aspirin                       therapy and may represent insufficient platelet                       inhibition.       POC Glucose 06/10/2020 190* 60 - 115 mg/dl Final    Performed on 06/10/2020 ACCU-CHEK   Final    Troponin 06/11/2020 0.023* 0.000 - 0.010 ng/mL Final    Methodology by Troponin T.    Troponin 06/11/2020 0.022* 0.000 - 0.010 ng/mL Final    Methodology by Troponin T.    Cholesterol, Total 06/11/2020 101  0 - 199 mg/dL Final    ATP III Cholesterol classification is Desirable.  Triglycerides 06/11/2020 82  0 - 150 mg/dL Final    ATP III Triglycerides Classification is Normal.    HDL 06/11/2020 48  40 - 59 mg/dL Final    Comment: ATP III HDL Cholesterol Classification is Desirable. Expected Values:    Males:    >55 = No Risk            35-55 = Moderate Risk            <35 = High Risk    Females:  >65 = No Risk            45-65 = Moderate Risk            <45 = High Risk    NCEP Guidelines: Third Report May 2001  >59 = negative risk factor for CHD  <40 = major risk factor for CHD      LDL Calculated 06/11/2020 37  0 - 129 mg/dL Final    ATP III LDL Classification is Optimal.    WBC 06/11/2020 8.1  4.8 - 10.8 K/uL Final    RBC 06/11/2020 3.30* 4.20 - 5.40 M/uL Final    Hemoglobin 06/11/2020 9.3* 12.0 - 16.0 g/dL Final    Hematocrit 06/11/2020 27.2* 37.0 - 47.0 % Final    MCV 06/11/2020 82.4  82.0 - 100.0 fL Final    MCH 06/11/2020 28.3  27.0 - 31.3 pg Final    MCHC 06/11/2020 34.3  33.0 - 37.0 % Final    RDW 06/11/2020 14.2  11.5 - 14.5 % Final    Platelets 19/15/3001 153  130 - 400 K/uL Final    Sodium 06/11/2020 134* 135 - 144 mEq/L Final    Potassium reflex Magnesium 06/11/2020 3.8  3.4 - 4.9 mEq/L Final    Chloride 06/11/2020 102  95 - 107 mEq/L Final    CO2 06/11/2020 17* 20 - 31 mEq/L Final    Anion Gap 06/11/2020 15  9 - 15 mEq/L Final    Glucose 06/11/2020 236* 70 - 99 mg/dL Final    BUN 06/11/2020 89* 8 - 23 mg/dL Final    CREATININE 06/11/2020 3.28* 0.50 - 0.90 mg/dL Final    GFR Non- 06/11/2020 14.2* >60 Final    Comment: >60 mL/min/1.73m2 EGFR, calc. for ages 25 and older using the  MDRD formula (not corrected for weight), is valid for stable  renal function.  GFR  06/11/2020 17.2* >60 Final    Comment: >60 mL/min/1.73m2 EGFR, calc.  for ages 25 and older using the  MDRD formula (not corrected for weight), is valid for stable  renal function.       Calcium 06/11/2020 9.8  8.5 - 9.9 mg/dL Final    Total Protein 06/11/2020 6.7  6.3 - 8.0 g/dL Final    Alb 06/11/2020 3.7  3.5 - 4.6 g/dL Final    Total Bilirubin 06/11/2020 0.4  0.2 - 0.7 mg/dL Final    Alkaline Phosphatase 06/11/2020 112  40 - 130 U/L Final    ALT 06/11/2020 30  0 - 33 U/L Final    AST 06/11/2020 30  0 - 35 U/L Final    Globulin 06/11/2020 3.0  2.3 - 3.5 g/dL Final    Ventricular Rate 06/11/2020 65  BPM Final    Atrial Rate 06/11/2020 65  BPM Final    P-R Interval 06/11/2020 290  ms Final    QRS Duration 06/11/2020 108  ms Final    Q-T Interval 06/11/2020 418  ms Final    QTc Calculation (Bazett) 06/11/2020 434  ms Final    P Axis 06/11/2020 65  degrees Final    R Axis 06/11/2020 -54  degrees Final    T Axis 06/11/2020 32  degrees Final    Hemoglobin A1C 06/11/2020 6.8* 4.8 - 5.9 % Final    POC Glucose 06/11/2020 486* 60 - 115 mg/dl Final    Performed on 06/11/2020 ACCU-CHEK   Final    Notified RN or MD    POC Glucose 06/11/2020 150* 60 - 115 mg/dl Final    Performed on 06/11/2020 ACCU-CHEK   Final    WBC 06/12/2020 8.1  4.8 - 10.8 K/uL Final    RBC 06/12/2020 3.26* 4.20 - 5.40 M/uL Final    Hemoglobin 06/12/2020 9.2* 12.0 - 16.0 g/dL Final    Hematocrit 06/12/2020 26.8* 37.0 - 47.0 % Final    MCV 06/12/2020 82.2  82.0 - 100.0 fL Final    MCH 06/12/2020 28.2  27.0 - 31.3 pg Final    MCHC 06/12/2020 34.3  33.0 - 37.0 % Final    RDW 06/12/2020 14.3  11.5 - 14.5 % Final    Platelets 84/17/3717 151  130 - 400 K/uL Final    Sodium 06/12/2020 136  135 - 144 mEq/L Final    Potassium 06/12/2020 3.9  3.4 - 4.9 mEq/L Final    Chloride 06/12/2020 104  95 - 107 mEq/L Final    CO2 06/12/2020 19* 20 - 31 mEq/L Final    Anion Gap 06/12/2020 13  9 - 15 mEq/L Final    Glucose 06/12/2020 146* 70 - 99 mg/dL Final    BUN 06/12/2020 87* 8 - 23 mg/dL Final    CREATININE 06/12/2020 3.49* 0.50 - 0.90 mg/dL Final    GFR Non- 06/12/2020 13.3* >60 Final    Comment: >60 mL/min/1.73m2 EGFR, calc. for ages 25 and older using the  MDRD formula (not corrected for weight), is valid for stable  renal function.  GFR  06/12/2020 16.0* >60 Final    Comment: >60 mL/min/1.73m2 EGFR, calc. for ages 25 and older using the  MDRD formula (not corrected for weight), is valid for stable  renal function.  Calcium 06/12/2020 9.1  8.5 - 9.9 mg/dL Final    POC Glucose 06/11/2020 130* 60 - 115 mg/dl Final    Performed on 06/11/2020 ACCU-CHEK   Final    Notified RN or MD    POC Glucose 06/12/2020 148* 60 - 115 mg/dl Final    Performed on 06/12/2020 ACCU-CHEK   Final    Notified RN or MD    POC Glucose 06/12/2020 305* 60 - 115 mg/dl Final    Performed on 06/12/2020 ACCU-CHEK   Final    POC Glucose 06/12/2020 184* 60 - 115 mg/dl Final    Performed on 06/12/2020 ACCU-CHEK   Final   No results displayed because visit has over 200 results. Orders Only on 04/14/2020   Component Date Value Ref Range Status    Hemoglobin A1C 04/14/2020 6.0* 4.8 - 5.9 % Final   Orders Only on 04/14/2020   Component Date Value Ref Range Status    Iron 04/14/2020 40  37 - 145 ug/dL Final    TIBC 04/14/2020 222  178 - 450 ug/dL Final    Iron Saturation 04/14/2020 18  11 - 46 % Final   Orders Only on 04/14/2020   Component Date Value Ref Range Status    Phosphorus 04/14/2020 3.8  2.3 - 4.8 mg/dL Final       HPI:    Fatigue   This is a recurrent problem. The current episode started more than 1 month ago. The problem occurs daily. The problem has been waxing and waning. Associated symptoms include chest pain, fatigue and weakness. Pertinent negatives include no abdominal pain, change in bowel habit, chills, congestion, coughing, diaphoresis, headaches, joint swelling or urinary symptoms. The symptoms are aggravated by exertion and stress.  She has tried rest and sleep for the for chills, diaphoresis and weight gain. HENT: Negative for congestion, nosebleeds and trouble swallowing. Eyes: Negative for photophobia and pain. Respiratory: Positive for shortness of breath (exertional). Negative for cough, choking and chest tightness. Cardiovascular: Positive for chest pain. Negative for palpitations and leg swelling. Gastrointestinal: Negative for abdominal distention, abdominal pain, blood in stool and change in bowel habit. Endocrine: Negative for cold intolerance, heat intolerance, polydipsia and polyuria. Genitourinary: Positive for frequency and urgency. Negative for difficulty urinating. Musculoskeletal: Negative for joint swelling. Skin: Negative for wound. Neurological: Positive for weakness. Negative for dizziness, tremors, syncope, speech difficulty and headaches. Psychiatric/Behavioral: Negative for confusion, decreased concentration, dysphoric mood and suicidal ideas. The patient is not nervous/anxious. Vitals:    06/16/20 1322   BP: (!) 154/72   Pulse: 76   Resp: 16   Temp: 97.3 °F (36.3 °C)   SpO2: 98%   Weight: 240 lb (108.9 kg)   Height: 5' 7\" (1.702 m)       Physical Exam  Constitutional:       General: She is not in acute distress. Appearance: She is obese. She is not ill-appearing. Comments: Age-appropriate, chronically ill-appearing, grooming appears good  Ambulates in the room slowly but without assistance   HENT:      Head: Atraumatic. Nose: No rhinorrhea. Mouth/Throat:      Mouth: Mucous membranes are dry. Eyes:      General: No scleral icterus. Extraocular Movements: Extraocular movements intact. Conjunctiva/sclera: Conjunctivae normal.   Neck:      Musculoskeletal: No neck rigidity. Cardiovascular:      Rate and Rhythm: Regular rhythm. Occasional extrasystoles are present. Pulses:           Radial pulses are 2+ on the right side and 2+ on the left side. Heart sounds: Heart sounds are distant.  No due to a good possibility for hypoventilation/atelectasis. If shortness of breath and wheezing continue, will need PFTs. Of note is that latest chest x-ray revealed mild coarsening of interstitial markings, to be followed with repeat chest x-ray in 1 month. Diabetes mellitus type 2 with complications, uncontrolled ([facility] [address]                  Closely watch blood sugar in the setting of chronic kidney disease, with insulin on board and supervised diet, to prevent hypoglycemic episodes. CAD S/P percutaneous coronary angioplasty                  Stable, no evidence of ongoing angina. Continue all current cardiac medications including recently started Ranexa, monitor blood pressure per usual protocol. Plan:    See all orders and comments in the assessment section. Reviewed with the patient: today's diagnosis and associated problems, treatment plans, prognosis, questions answered. Orders for laboratories placed in the nursing home chart. Close follow up needed to evaluate treatment results and for care coordination. Follow up in one week by CNP or by MD.     I have reviewed the patient's medical and surgical, family and social history, health maintenance schedule, and updated the computerized patient record. Please note this report has been partially produced by using speech recognition hardware. It may contain errors related to the system, including grammar, punctuation and spelling as well as words and phrases that may seem inaccurate. For anyquestions or concerns, please feel free to contact me for clarification.         Electronically signed by Mery Astudillo MD

## 2020-06-16 NOTE — CARE COORDINATION
06/15/2020    K 4.0 06/15/2020     06/15/2020    CO2 20 06/15/2020         Anthropometric Measurements:    Height: 67 inches (170.18 cm)  Weight: 240 lb (109.09 kg)  BMI: 37.59 (obesity class II)  IBW: 135 lb (61.36 kg) +-10%  %IBW: 177.7%  AdBW: 177 lb (80.45 kg)    Physical Exam Findings:  Deferred    Nutriti on Interview: RD called pt, explained reason for call and role in care. Pt states appetite is okay- typically eats 3 meals/day. Pt explains she is in a nursing home right now but at home she is on a slim fast diet- per pt she will drink a slim fast twice/day and eat one meal when at home. See food recall below. RD referred for DM and CHF diet education and reinforcement. RD explained the importance of eating consistently throughout the day to help keep blood sugars steady, avoiding spikes and dips. Reviewed which foods contain carbohydrates and which foods do not contain carbohydrates. Explained carbohydrates in food raise blood glucose and the importance of eating balanced meals using the MyPlate MGNSDFORT-5/5 plate fruits and/or vegetables, 1/4 plate protein and 1/4 plate starchy carbohydrates with 8 oz glass of low fat milk if desired. RD used the pt's breakfast example to explain which components of the meal are complete and which components are incomplete. Pt verbalizes understanding. RD explained the importance of watching the amount of sodium to prevent body from holding extra fluid. Explained sodium is hidden in a lot of foods and the importance of avoiding the salt shaker- pt states she does not use the salt shaker- RD encouraged pt to continue doing so. RD discussed when pt is home to read food labels choosing foods with 140 mg of sodium or less per serving or ones with the American Heart Association heart check pete.  Pt had a specific question about diet soda- RD discussed avoiding pop including diet pop is the best and explained why- pt states she used to drink pop but now she only drinks

## 2020-06-18 ENCOUNTER — OFFICE VISIT (OUTPATIENT)
Dept: GERIATRIC MEDICINE | Age: 60
End: 2020-06-18
Payer: MEDICARE

## 2020-06-18 PROCEDURE — 99308 SBSQ NF CARE LOW MDM 20: CPT | Performed by: NURSE PRACTITIONER

## 2020-06-19 LAB
BASOPHILS ABSOLUTE: ABNORMAL
BASOPHILS RELATIVE PERCENT: ABNORMAL
BUN BLDV-MCNC: 57 MG/DL
CALCIUM SERPL-MCNC: 8.8 MG/DL
CHLORIDE BLD-SCNC: 93 MMOL/L
CO2: 22 MMOL/L
CREAT SERPL-MCNC: 3.7 MG/DL
EOSINOPHILS ABSOLUTE: ABNORMAL
EOSINOPHILS RELATIVE PERCENT: ABNORMAL
GFR CALCULATED: 12
GLUCOSE BLD-MCNC: 67 MG/DL
HCT VFR BLD CALC: 26.9 % (ref 36–46)
HEMOGLOBIN: 9.1 G/DL (ref 12–16)
LYMPHOCYTES ABSOLUTE: ABNORMAL
LYMPHOCYTES RELATIVE PERCENT: ABNORMAL
MCH RBC QN AUTO: 27.2 PG
MCHC RBC AUTO-ENTMCNC: 11 G/DL
MCV RBC AUTO: 80.8 FL
MONOCYTES ABSOLUTE: ABNORMAL
MONOCYTES RELATIVE PERCENT: ABNORMAL
NEUTROPHILS ABSOLUTE: ABNORMAL
NEUTROPHILS RELATIVE PERCENT: ABNORMAL
PLATELET # BLD: 206 K/ΜL
PMV BLD AUTO: ABNORMAL FL
POTASSIUM SERPL-SCNC: 4.3 MMOL/L
RBC # BLD: 3.33 10^6/ΜL
SODIUM BLD-SCNC: 128 MMOL/L
WBC # BLD: 8.3 10^3/ML

## 2020-06-22 ENCOUNTER — VIRTUAL VISIT (OUTPATIENT)
Dept: ENDOCRINOLOGY | Age: 60
End: 2020-06-22
Payer: MEDICARE

## 2020-06-22 ENCOUNTER — VIRTUAL VISIT (OUTPATIENT)
Dept: CARDIOLOGY CLINIC | Age: 60
End: 2020-06-22
Payer: MEDICARE

## 2020-06-22 PROCEDURE — G8428 CUR MEDS NOT DOCUMENT: HCPCS | Performed by: INTERNAL MEDICINE

## 2020-06-22 PROCEDURE — 99213 OFFICE O/P EST LOW 20 MIN: CPT | Performed by: PHYSICIAN ASSISTANT

## 2020-06-22 PROCEDURE — 99214 OFFICE O/P EST MOD 30 MIN: CPT | Performed by: INTERNAL MEDICINE

## 2020-06-22 PROCEDURE — 1111F DSCHRG MED/CURRENT MED MERGE: CPT | Performed by: INTERNAL MEDICINE

## 2020-06-22 PROCEDURE — 3017F COLORECTAL CA SCREEN DOC REV: CPT | Performed by: INTERNAL MEDICINE

## 2020-06-22 ASSESSMENT — ENCOUNTER SYMPTOMS
SORE THROAT: 0
VOMITING: 0
RESPIRATORY NEGATIVE: 1
SINUS PRESSURE: 0
WHEEZING: 0
CHEST TIGHTNESS: 0
RHINORRHEA: 0
COUGH: 0
EYE REDNESS: 0
EYES NEGATIVE: 1
BLOOD IN STOOL: 0
SHORTNESS OF BREATH: 0
DIARRHEA: 0
SHORTNESS OF BREATH: 0
GASTROINTESTINAL NEGATIVE: 1
NAUSEA: 0
WHEEZING: 0
EYE PAIN: 0
NAUSEA: 0
STRIDOR: 0
ABDOMINAL PAIN: 0
COUGH: 0

## 2020-06-22 NOTE — PROGRESS NOTES
Subsequent Progress Note  Patient: Lesly Flores  YOB: 1960  MRN: [id number]    Chief Complaint:  Chief Complaint   Patient presents with   Shagufta Vargas pt   CV Data:    Subjective/HPI: TELEHEALTH EVALUATION -- Audio/Visual (During RRQJC-85 public health emergency)    Pt is at a nursing home now. No cp occ SOB no falls no bleed. Takes meds. Recently Plavix stopped and Brilinta added. Had recerrnet admissions for CP     EKG:    Past Medical History:   Diagnosis Date    Anxiety     CAD S/P percutaneous coronary angioplasty ,     stents per dr Elizabeth El CKD stage 4 due to type 2 diabetes mellitus ([address]     Diabetic nephropathy with proteinuria ([address]     DJD (degenerative joint disease) of knee     Dr Ngozi Barnett GERD (gastroesophageal reflux disease)     Hemiparesis, left ([address] 2013    [facility] Assisted Living ([facility]    History of heart failure     History of seizures     History of type C viral hepatitis     HTN (hypertension)     Hyperlipidemia     Impaired mobility and activities of daily living     Mediastinal lymphadenopathy     [facility] Emelina Avery, [facility]    Metabolic syndrome     Neurogenic urinary incontinence 2013    Neuropathy in diabetes ([address]     Obesity (BMI 30-39. 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 ([address]     Type 2 diabetes mellitus with renal manifestations, controlled ([address] 2015    Insulin dependent, Dr Renaldo Gee Urinary incontinence due to cognitive impairment 2013    Vitamin D deficiency 2014       Past Surgical History:   Procedure Laterality Date     SECTION      x1    COLONOSCOPY  2014 Diet, Take Precautions to Prevent Falls and Walk Daily  Pursuant to the emergency declaration under the [facility] waiver authority and the [employer] Act, this Virtual Visit was conducted, with patient's consent, to reduce the patient's risk of exposure to COVID-19 and provide continuity of care for an established patient. Services were provided through a video synchronous discussion virtually to substitute for in-person clinic visit. Visit completed using [employer] me. Patient was located at home and I was located in the clinic. Return in about 4 weeks (around 7/20/2020).       Electronically signed by Tanya Broussard MD on 6/22/2020 at 2:57 PM

## 2020-06-22 NOTE — PROGRESS NOTES
diabetic treatment includes insulin injections. She is compliant with treatment all of the time. She is currently taking insulin pre-breakfast, pre-lunch, pre-dinner and at bedtime. Insulin injections are given by patient and adult caretaker. Rotation sites for injection include the abdominal wall. She is following a generally unhealthy diet. Meal planning includes avoidance of concentrated sweets. She rarely participates in exercise. She monitors blood glucose at home 5+ x per day (Multiple insulin dosing changes beign made ). Blood glucose monitoring compliance is good. There is no change in her home blood glucose trend. Her overall blood glucose range is 140-180 mg/dl. An ACE inhibitor/angiotensin II receptor blocker is being taken. She sees a podiatrist.Eye exam is current. Review of Systems   Constitutional: Negative for chills, fatigue and fever. HENT: Negative for congestion, ear pain, postnasal drip, rhinorrhea, sinus pressure and sore throat. Eyes: Negative for pain and redness. Respiratory: Negative for cough, shortness of breath and wheezing. Cardiovascular: Negative for chest pain, palpitations and leg swelling. Gastrointestinal: Negative for abdominal pain, diarrhea, nausea and vomiting. Genitourinary: Negative for difficulty urinating. Musculoskeletal: Negative for arthralgias. Skin: Negative for rash. Neurological: Negative for dizziness and headaches. Lab Results   Component Value Date    LABA1C 6.8 (H) 06/11/2020    LABA1C 7.1 (H) 05/24/2020    LABA1C 6.0 (H) 04/14/2020   Results for Aaron Brewer (MRN [id number]) as of 6/22/2020 10:41   Ref.  Range 6/19/2020 00:00   WBC Latest Units: 10^3/mL 8.3   RBC Latest Units: 10^6/µL 3.33   Hemoglobin Quant Latest Ref Range: 12.0 - 16.0 g/dL 9.1 (A)   Hematocrit Latest Ref Range: 36 - 46 % 26.9 (A)   MCV Latest Units: fL 80.8   MCH Latest Units: pg 27.2   MCHC Latest Units: g/dL 11.0   Platelet Count Latest Units: K/µL 206 Ref. Range 6/15/2020 05:51   Sodium Latest Ref Range: 135 - 144 mEq/L 138   Potassium Latest Ref Range: 3.4 - 4.9 mEq/L 4.0   Chloride Latest Ref Range: 95 - 107 mEq/L 105   CO2 Latest Ref Range: 20 - 31 mEq/L 20   BUN Latest Ref Range: 8 - 23 mg/dL 81 (HH)   Creatinine Latest Ref Range: 0.50 - 0.90 mg/dL 3.83 (H)   Anion Gap Latest Ref Range: 9 - 15 mEq/L 13   GFR Non- Latest Ref Range: >60  11.9 (L)   GFR  Latest Ref Range: >60  14.4 (L)   Glucose Latest Ref Range: 70 - 99 mg/dL 113 (H)   Calcium Latest Ref Range: 8.5 - 9.9 mg/dL 9.6   Total Protein Latest Ref Range: 6.3 - 8.0 g/dL 6.2 (L)   Albumin Latest Ref Range: 3.5 - 4.6 g/dL 3.2 (L)   Alk Phos Latest Ref Range: 40 - 130 U/L 106   ALT Latest Ref Range: 0 - 33 U/L 25   AST Latest Ref Range: 0 - 35 U/L 19   Bilirubin Latest Ref Range: 0.2 - 0.7 mg/dL 0.3   Bilirubin, Direct Latest Ref Range: 0.0 - 0.4 mg/dL <0.2   Bilirubin, Indirect Latest Ref Range: 0.0 - 0.6 mg/dL see below         Prior to Visit Medications    Medication Sig Taking? Authorizing Provider   insulin aspart (NOVOLOG FLEXPEN) 100 UNIT/ML injection pen Inject into the skin 3 times daily (before meals) 12 units TID before meals. Also use sliding scale- 1 unit for every 50 over 150.   Historical Provider, MD   insulin glargine (LANTUS SOLOSTAR) 100 UNIT/ML injection pen Inject into the skin nightly Inject 35 units once nightly  Historical Provider, MD   Blood Glucose Monitoring Suppl (FREESTYLE LITE) CORETTA 1 Device by Does not apply route daily as needed Use freestyle meter to test blood sugar as needed  Historical Provider, MD   furosemide (LASIX) 40 MG tablet Take 2 tablets by mouth daily  Pitney Jazmine, DO   ticagrelor (BRILINTA) 90 MG TABS tablet Take 1 tablet by mouth 2 times daily  Pitney Jazmine, DO   ranolazine (RANEXA) 500 MG extended release tablet Take 1 tablet by mouth 2 times daily  Love Lucero DO   ARIPiprazole (ABILIFY) 5 MG tablet Take 1 stents per dr Dean Mckeon    CKD stage 4 due to type 2 diabetes mellitus ([address]     Diabetic nephropathy with proteinuria ([address] 2014    DJD (degenerative joint disease) of knee     Dr Mely Palumbo GERD (gastroesophageal reflux disease)     Hemiparesis, left ([address] 2013    entered Assisted Living ([facility])    History of heart failure     History of seizures     History of type C viral hepatitis     HTN (hypertension)     Hyperlipidemia     Impaired mobility and activities of daily living     Mediastinal lymphadenopathy 2013    Gustabo Smith    Metabolic syndrome     Neurogenic urinary incontinence 2013    Neuropathy in diabetes ([address]     Obesity (BMI 30-39. 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 No follow-ups on file. An  electronic signature was used to authenticate this note.     --MARCELO Ventura on 6/22/2020 at 10:40 AM        Pursuant to the emergency declaration under the [address] [place] [address], [address] [place] [address] authority and the [employer] Act, this Virtual  Visit was

## 2020-06-25 ENCOUNTER — OFFICE VISIT (OUTPATIENT)
Dept: GERIATRIC MEDICINE | Age: 60
End: 2020-06-25
Payer: MEDICARE

## 2020-06-25 VITALS
HEART RATE: 68 BPM | SYSTOLIC BLOOD PRESSURE: 138 MMHG | RESPIRATION RATE: 16 BRPM | OXYGEN SATURATION: 98 % | BODY MASS INDEX: 40.1 KG/M2 | DIASTOLIC BLOOD PRESSURE: 66 MMHG | WEIGHT: 256 LBS | TEMPERATURE: 98.1 F

## 2020-06-25 PROBLEM — E66.813 OBESITY, CLASS III, BMI 40-49.9 (MORBID OBESITY) (HCC): Status: ACTIVE | Noted: 2019-12-03

## 2020-06-25 PROBLEM — E66.01 OBESITY, CLASS III, BMI 40-49.9 (MORBID OBESITY) (HCC): Status: ACTIVE | Noted: 2019-12-03

## 2020-06-25 PROCEDURE — 3044F HG A1C LEVEL LT 7.0%: CPT | Performed by: NURSE PRACTITIONER

## 2020-06-25 PROCEDURE — 99316 NF DSCHRG MGMT 30 MIN+: CPT | Performed by: NURSE PRACTITIONER

## 2020-06-26 LAB
BUN BLDV-MCNC: 75 MG/DL
CALCIUM SERPL-MCNC: 9.1 MG/DL
CHLORIDE BLD-SCNC: 93 MMOL/L
CO2: 20 MMOL/L
CREAT SERPL-MCNC: 4.6 MG/DL
GFR CALCULATED: NORMAL
GLUCOSE BLD-MCNC: 63 MG/DL
POTASSIUM SERPL-SCNC: 4.6 MMOL/L
SODIUM BLD-SCNC: 126 MMOL/L

## 2020-06-26 RX ORDER — TRIAMCINOLONE ACETONIDE 1 MG/G
CREAM TOPICAL
Qty: 80 G | Refills: 0 | Status: SHIPPED | OUTPATIENT
Start: 2020-06-26 | End: 2020-07-29

## 2020-06-26 RX ORDER — NYSTATIN 100000 [USP'U]/G
POWDER TOPICAL
Qty: 60 G | Refills: 3 | Status: SHIPPED | OUTPATIENT
Start: 2020-06-26 | End: 2020-10-26

## 2020-06-26 NOTE — PROGRESS NOTES
[facility]  [address] [place] [address], [address]      Yolie Willis  is a 56 y.o. in the [place] being seen for a f/u of   Chief Complaint   Patient presents with   [facility] Other     discharge [facility]       6/25/2020    HPI patient was here for PT OT rehabilitation after she was having shortness of breath and stated she was unable to care for herself  She does have CAD she had a history of a recent stent  Patient is 30 pounds heavier than when she was here last time for rehabilitation we talked extensively about her diet at today's discharge visit  She states she has been watching her weight she is eating low carb diet however at lunch today she has 2 full meals  She has had education regarding her carb controlled diet and insulin along with exercise prescription in the past this continues to be an ongoing problem she is blatantly noncompliant  During her stay here she was ambulatory with a walker she has shortness of breath with exercise dyspnea with exertion dyspnea with ADLs but she is not short of breath at rest  She continues to be concerned about her blood sugars she constantly asked me what are my going to do about her blood sugars over 200 she does not have a lot of blood sugars over 200 I constantly reassure her that her blood sugars are related to her p.o. intake and she needs to stop carb loading she also saw Dr. Chelsea Martin her endocrinologist while she was here he made no changes to her medications at this time  She had no angina and no hypoxia issues no COPD exacerbation      Past Medical History:   Diagnosis Date    Anxiety     CAD S/P percutaneous coronary angioplasty 2015, 2018    stents per dr Reji Lew CKD stage 4 due to type 2 diabetes mellitus ([address]     Diabetic nephropathy with proteinuria ([address] 2014    DJD (degenerative joint disease) of knee     Dr Deepali Chandler GERD (gastroesophageal reflux disease)     Hemiparesis, left ([address] 2013    [facility] ([facility]    History of heart failure     History of seizures     History of type C viral hepatitis     HTN (hypertension)     Hyperlipidemia     Impaired mobility and activities of daily living     Mediastinal lymphadenopathy     Dr Sanya Ashraf, Finis Schilder    Metabolic syndrome     Neurogenic urinary incontinence 2013    [facility]urop[facility] in diabetes [facility]     Obesity (BMI 30-39. 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 Cox Click      x1 Dr. Jojo Chan, Dr Fantasma Bansal CATH LAB PROCEDURE  10/02/2019    HYSTERECTOMY, TOTAL ABDOMINAL      one ovary intact, Dr Dana Traylor, menorrhagia    NE TOTAL KNEE ARTHROPLASTY Left 2018    LEFT KNEE TOTAL KNEE ARTHROPLASTY, SHAYNA, NERVE BLOCK performed by Maricarmen Ham MD at [address] [place] [address] Right     TOTAL KNEE ARTHROPLASTY  16    Dr Nuñez     Family History   Problem Relation Age of Onset    Cancer Mother 76        survived   Thomasena Adams Hypertension Father     Diabetes Sister     Mental Illness Sister      Social History     Socioeconomic History    Marital status:      Spouse name: Not on file    Number of children: 2    Years of education: Not on file    Highest education level: Not on file   Occupational History    Occupation: disabled   Social Needs    Financial resource strain: Not on file    Food insecurity     Worry: Not on file     Inability: Not on file    Transportation needs     Medical: Not on file     Non-medical: Not on file   Tobacco Use    Smoking status: Passive Smoke Exposure - Never Smoker    Smokeless tobacco: Never Used   Substance and Sexual Activity    Alcohol use: No     Alcohol/week: 0.0 standard drinks    Drug use: No    Sexual activity: Not Currently   Lifestyle    Physical activity     Days per week: Not on file     Minutes per session: Not on file    Stress: Not on file   Relationships    Social connections     Talks on phone: Not on file     Gets together: Not on file     Attends Spiritism service: Not on file     Active member of club or organization: Not on file     Attends meetings of clubs or organizations: Not on file     Relationship status: Not on file    Intimate partner violence     Fear of current or ex partner: Not on file     Emotionally abused: Not on file     Physically abused: Not on file     Forced sexual activity: Not on file   Other Topics Concern    Not on file   Social History Narrative    Born in [place], one of 5    Twin sister Reyes, very ill in 2018, [place] [id number]    Moved to [facility] [facility], , 2 children, one son and one daughter    Worked at [employer], as a nurse's aide    Disabled due to mental illness    Lived at [employer] was discharged, returned to independent living in 2017 in the daughter's house and has adjusted well    One son and one daughter, live in the same house with patient, Bill Scanlon pays the rent    [employer] reading (misteries)       Allergies: Codeine and Oxycontin [oxycodone hcl]  NF MEDICATIONS REVIEWED  At discharge medications   pantoprazole 20 mg daily   pregabalin 75 mg twice daily   pro-air HFA 2 puffs every 6 hours as needed for wheezing   Ranexa  mg twice daily sertraline 50 mg daily   ticagrelor 90 mg twice daily   vitamin B12 100 mcg daily   vitamin D3 25 mcg daily  Nitroglycerin 0.4 mg sublingually as needed   melatonin 3 mg nightly   magnesium oxide 400 mg daily loperamide 2 mg as needed   isosorbide mononitrate  mg daily lispro 12 units 3 times a day prior to meals   furosemide 40 mg 2 tabs or 80 mg daily amlodipine 10 mg daily   Tylenol as needed   aripiprazole 5 mg daily   aspirin 81 mg daily   atorvastatin 40 mg daily   Basaglar long-acting insulin 35 units nightly   carvedilol 12.5 mg twice a day    Allergies codeine and OxyContin      ROS:   Constitutional: There are no reports of behavioral issues, change in appetite, fever, or weakness. Respiratory: intermittent SOB   Cardiovascular: No CP  GI: No N/V/D. : no reports of dysuria  Extremities: No reports of pain issues, nor edema  Rehabilitation: Participated in rehabilitation and is able to perform ADLs, toilet self, obtain nutrition and remove self from home in an emergency. Requests home health therapy at discharge. Physical exam:   /66   Pulse 68   Temp 98.1 °F (36.7 °C)   Resp 16   Wt 256 lb (116.1 kg)   LMP  (LMP Unknown)   SpO2 98%   BMI 40.10 kg/m²   Constitutional: Awake and alert sitting up in room. Cardiovascular: Regular rate  -c/r  Respiratory: LCTA  GI: abdomen NT ND, obese  : no suprapubic tenderness  Extremities: no edema, DP pulses +  Mobility: is able to transfer in out of bed to chair    ASSESSMENT:     Diagnosis Orders   1. Obesity, Class III, BMI 40-49.9 (morbid obesity) ([facility]     2. Shortness of breath     3. Diastolic congestive heart failure, unspecified HF chronicity ([facility]     4. Uncontrolled type 2 diabetes mellitus with hyperglycemia ([facility]     5. Coronary artery disease involving native coronary artery of native heart with angina pectoris (HCC)         PLAN:   1. 7# wt gain in 5 days, extra dose of lasix + 10mEq KCl x one today. Weigh tomorrow  She can still go home Saturday as planned   2 sob stable, may be related to obesity, non cardiac  3.  Stable

## 2020-06-27 NOTE — PROGRESS NOTES
 Social connections     Talks on phone: Not on file     Gets together: Not on file     Attends Jewish service: Not on file     Active member of club or organization: Not on file     Attends meetings of clubs or organizations: Not on file     Relationship status: Not on file    Intimate partner violence     Fear of current or ex partner: Not on file     Emotionally abused: Not on file     Physically abused: Not on file     Forced sexual activity: Not on file   Other Topics Concern    Not on file   Social History Narrative    Born in [place], one of 5    Twin sister Reyes, very ill in 2018, [place] [phone]    Moved to [facility], , 2 children, one son and one daughter    Worked at [employer], as a nurse's aide    Disabled due to mental illness    Lived at [employer], was discharged, returned to independent living in 2017 in the daughter's house and has adjusted well    One son and one daughter, live in the same house with patient, Tristin Casey pays the rent    [email or web address] reading (misteries)       Allergies: Codeine and Oxycontin [oxycodone hcl]  NF MEDICATIONS REVIEWED    ROS:   Constitutional: There are no reports of behavioral issues, change in appetite, fever, or increased weakness. No bleeding issues. Respiratory: + SOB, dyspnea, dyspnea on exertion, change in exercise capacity  Cardiovascular: denies CP, lightheadedness, palpitations, PND, orthopnea, or claudication. GI: No N/V/D. : no reports of dysuria  Extremities: No reports of pain issues, edema   Psych: at baseline awake alert lucid    Physical exam:   /62   Pulse 78   Temp 98.1 °F (36.7 °C)   Resp 16   Wt 237 lb (107.5 kg)   LMP  (LMP Unknown)   SpO2 97%   BMI 37.12 kg/m²   Constitutional: Awake and alert sitting up in lounge chair in room, general weakness  HEENT: Normocephalic, intact facial symmetry, EOMs intact, sclera non icteric, pupils are equal and round, buccal mucosa pink and moist  Speech clear but sparse.

## 2020-06-28 ENCOUNTER — HOSPITAL ENCOUNTER (INPATIENT)
Age: 60
LOS: 4 days | Discharge: NURSING, REHAB OR LONG-TERM CARE | DRG: 291 | End: 2020-07-02
Attending: FAMILY MEDICINE | Admitting: INTERNAL MEDICINE
Payer: MEDICARE

## 2020-06-28 ENCOUNTER — APPOINTMENT (OUTPATIENT)
Dept: GENERAL RADIOLOGY | Age: 60
DRG: 291 | End: 2020-06-28
Payer: MEDICARE

## 2020-06-28 VITALS
OXYGEN SATURATION: 97 % | SYSTOLIC BLOOD PRESSURE: 133 MMHG | BODY MASS INDEX: 37.12 KG/M2 | RESPIRATION RATE: 16 BRPM | DIASTOLIC BLOOD PRESSURE: 62 MMHG | HEART RATE: 78 BPM | WEIGHT: 237 LBS | TEMPERATURE: 98.1 F

## 2020-06-28 PROBLEM — N19 RENAL FAILURE: Status: ACTIVE | Noted: 2020-06-28

## 2020-06-28 LAB
ALBUMIN SERPL-MCNC: 4 G/DL (ref 3.5–4.6)
ALP BLD-CCNC: 105 U/L (ref 40–130)
ALT SERPL-CCNC: 34 U/L (ref 0–33)
ANION GAP SERPL CALCULATED.3IONS-SCNC: 12 MEQ/L (ref 9–15)
AST SERPL-CCNC: 45 U/L (ref 0–35)
BASOPHILS ABSOLUTE: 0.1 K/UL (ref 0–0.2)
BASOPHILS RELATIVE PERCENT: 0.8 %
BILIRUB SERPL-MCNC: 0.5 MG/DL (ref 0.2–0.7)
BUN BLDV-MCNC: 72 MG/DL (ref 8–23)
CALCIUM SERPL-MCNC: 9.8 MG/DL (ref 8.5–9.9)
CHLORIDE BLD-SCNC: 92 MEQ/L (ref 95–107)
CO2: 22 MEQ/L (ref 20–31)
CREAT SERPL-MCNC: 4.13 MG/DL (ref 0.5–0.9)
EKG ATRIAL RATE: 60 BPM
EKG Q-T INTERVAL: 420 MS
EKG QRS DURATION: 122 MS
EKG QTC CALCULATION (BAZETT): 420 MS
EKG R AXIS: -52 DEGREES
EKG T AXIS: 33 DEGREES
EKG VENTRICULAR RATE: 60 BPM
EOSINOPHILS ABSOLUTE: 0.2 K/UL (ref 0–0.7)
EOSINOPHILS RELATIVE PERCENT: 2.8 %
GFR AFRICAN AMERICAN: 13.2
GFR NON-AFRICAN AMERICAN: 10.9
GLOBULIN: 2.9 G/DL (ref 2.3–3.5)
GLUCOSE BLD-MCNC: 135 MG/DL (ref 60–115)
GLUCOSE BLD-MCNC: 71 MG/DL (ref 70–99)
HCT VFR BLD CALC: 26.2 % (ref 37–47)
HEMOGLOBIN: 8.9 G/DL (ref 12–16)
LYMPHOCYTES ABSOLUTE: 1 K/UL (ref 1–4.8)
LYMPHOCYTES RELATIVE PERCENT: 14 %
MCH RBC QN AUTO: 27.4 PG (ref 27–31.3)
MCHC RBC AUTO-ENTMCNC: 33.8 % (ref 33–37)
MCV RBC AUTO: 81.1 FL (ref 82–100)
MONOCYTES ABSOLUTE: 0.6 K/UL (ref 0.2–0.8)
MONOCYTES RELATIVE PERCENT: 7.6 %
NEUTROPHILS ABSOLUTE: 5.5 K/UL (ref 1.4–6.5)
NEUTROPHILS RELATIVE PERCENT: 74.8 %
PDW BLD-RTO: 14.6 % (ref 11.5–14.5)
PERFORMED ON: ABNORMAL
PLATELET # BLD: 208 K/UL (ref 130–400)
POTASSIUM SERPL-SCNC: 5 MEQ/L (ref 3.4–4.9)
PRO-BNP: 8185 PG/ML
RBC # BLD: 3.23 M/UL (ref 4.2–5.4)
SARS-COV-2, NAAT: NOT DETECTED
SODIUM BLD-SCNC: 126 MEQ/L (ref 135–144)
TOTAL PROTEIN: 6.9 G/DL (ref 6.3–8)
TROPONIN: 0.01 NG/ML (ref 0–0.01)
TROPONIN: <0.01 NG/ML (ref 0–0.01)
TSH SERPL DL<=0.05 MIU/L-ACNC: 0.45 UIU/ML (ref 0.44–3.86)
WBC # BLD: 7.3 K/UL (ref 4.8–10.8)

## 2020-06-28 PROCEDURE — 83880 ASSAY OF NATRIURETIC PEPTIDE: CPT

## 2020-06-28 PROCEDURE — 71045 X-RAY EXAM CHEST 1 VIEW: CPT

## 2020-06-28 PROCEDURE — 80053 COMPREHEN METABOLIC PANEL: CPT

## 2020-06-28 PROCEDURE — 6360000002 HC RX W HCPCS: Performed by: FAMILY MEDICINE

## 2020-06-28 PROCEDURE — 2580000003 HC RX 258: Performed by: INTERNAL MEDICINE

## 2020-06-28 PROCEDURE — 6360000002 HC RX W HCPCS: Performed by: INTERNAL MEDICINE

## 2020-06-28 PROCEDURE — 84484 ASSAY OF TROPONIN QUANT: CPT

## 2020-06-28 PROCEDURE — 51702 INSERT TEMP BLADDER CATH: CPT

## 2020-06-28 PROCEDURE — 1210000000 HC MED SURG R&B

## 2020-06-28 PROCEDURE — 36415 COLL VENOUS BLD VENIPUNCTURE: CPT

## 2020-06-28 PROCEDURE — 99285 EMERGENCY DEPT VISIT HI MDM: CPT

## 2020-06-28 PROCEDURE — 84443 ASSAY THYROID STIM HORMONE: CPT

## 2020-06-28 PROCEDURE — U0002 COVID-19 LAB TEST NON-CDC: HCPCS

## 2020-06-28 PROCEDURE — 85025 COMPLETE CBC W/AUTO DIFF WBC: CPT

## 2020-06-28 PROCEDURE — 6370000000 HC RX 637 (ALT 250 FOR IP): Performed by: INTERNAL MEDICINE

## 2020-06-28 PROCEDURE — 96374 THER/PROPH/DIAG INJ IV PUSH: CPT

## 2020-06-28 PROCEDURE — 93005 ELECTROCARDIOGRAM TRACING: CPT | Performed by: FAMILY MEDICINE

## 2020-06-28 RX ORDER — SODIUM POLYSTYRENE SULFONATE 15 G/60ML
15 SUSPENSION ORAL; RECTAL ONCE
Status: DISCONTINUED | OUTPATIENT
Start: 2020-06-28 | End: 2020-07-02 | Stop reason: HOSPADM

## 2020-06-28 RX ORDER — MAGNESIUM SULFATE IN WATER 40 MG/ML
2 INJECTION, SOLUTION INTRAVENOUS PRN
Status: DISCONTINUED | OUTPATIENT
Start: 2020-06-28 | End: 2020-07-02 | Stop reason: HOSPADM

## 2020-06-28 RX ORDER — INSULIN GLARGINE 100 [IU]/ML
30 INJECTION, SOLUTION SUBCUTANEOUS NIGHTLY
Status: DISCONTINUED | OUTPATIENT
Start: 2020-06-28 | End: 2020-07-02 | Stop reason: HOSPADM

## 2020-06-28 RX ORDER — VITAMIN B COMPLEX
1 TABLET ORAL DAILY
Status: DISCONTINUED | OUTPATIENT
Start: 2020-06-28 | End: 2020-07-02 | Stop reason: HOSPADM

## 2020-06-28 RX ORDER — ATORVASTATIN CALCIUM 40 MG/1
40 TABLET, FILM COATED ORAL DAILY
Status: DISCONTINUED | OUTPATIENT
Start: 2020-06-28 | End: 2020-07-02 | Stop reason: HOSPADM

## 2020-06-28 RX ORDER — POTASSIUM CHLORIDE 7.45 MG/ML
10 INJECTION INTRAVENOUS PRN
Status: DISCONTINUED | OUTPATIENT
Start: 2020-06-28 | End: 2020-07-02 | Stop reason: HOSPADM

## 2020-06-28 RX ORDER — POTASSIUM CHLORIDE 20 MEQ/1
40 TABLET, EXTENDED RELEASE ORAL PRN
Status: DISCONTINUED | OUTPATIENT
Start: 2020-06-28 | End: 2020-07-02 | Stop reason: HOSPADM

## 2020-06-28 RX ORDER — RANOLAZINE 500 MG/1
500 TABLET, EXTENDED RELEASE ORAL 2 TIMES DAILY
Status: DISCONTINUED | OUTPATIENT
Start: 2020-06-28 | End: 2020-07-02 | Stop reason: HOSPADM

## 2020-06-28 RX ORDER — DEXTROSE MONOHYDRATE 25 G/50ML
12.5 INJECTION, SOLUTION INTRAVENOUS PRN
Status: DISCONTINUED | OUTPATIENT
Start: 2020-06-28 | End: 2020-07-02 | Stop reason: HOSPADM

## 2020-06-28 RX ORDER — ONDANSETRON 2 MG/ML
4 INJECTION INTRAMUSCULAR; INTRAVENOUS EVERY 6 HOURS PRN
Status: DISCONTINUED | OUTPATIENT
Start: 2020-06-28 | End: 2020-07-02 | Stop reason: HOSPADM

## 2020-06-28 RX ORDER — LANOLIN ALCOHOL/MO/W.PET/CERES
3 CREAM (GRAM) TOPICAL NIGHTLY PRN
Status: DISCONTINUED | OUTPATIENT
Start: 2020-06-28 | End: 2020-07-02 | Stop reason: HOSPADM

## 2020-06-28 RX ORDER — PROMETHAZINE HYDROCHLORIDE 25 MG/1
12.5 TABLET ORAL EVERY 6 HOURS PRN
Status: DISCONTINUED | OUTPATIENT
Start: 2020-06-28 | End: 2020-07-02 | Stop reason: HOSPADM

## 2020-06-28 RX ORDER — SODIUM CHLORIDE 0.9 % (FLUSH) 0.9 %
10 SYRINGE (ML) INJECTION EVERY 12 HOURS SCHEDULED
Status: DISCONTINUED | OUTPATIENT
Start: 2020-06-28 | End: 2020-07-02 | Stop reason: HOSPADM

## 2020-06-28 RX ORDER — ACETAMINOPHEN 650 MG/1
650 SUPPOSITORY RECTAL EVERY 6 HOURS PRN
Status: DISCONTINUED | OUTPATIENT
Start: 2020-06-28 | End: 2020-07-02 | Stop reason: HOSPADM

## 2020-06-28 RX ORDER — DEXTROSE MONOHYDRATE 50 MG/ML
100 INJECTION, SOLUTION INTRAVENOUS PRN
Status: DISCONTINUED | OUTPATIENT
Start: 2020-06-28 | End: 2020-07-02 | Stop reason: HOSPADM

## 2020-06-28 RX ORDER — ARIPIPRAZOLE 5 MG/1
5 TABLET ORAL DAILY
Status: DISCONTINUED | OUTPATIENT
Start: 2020-06-28 | End: 2020-07-02 | Stop reason: HOSPADM

## 2020-06-28 RX ORDER — ACETAMINOPHEN 325 MG/1
650 TABLET ORAL EVERY 6 HOURS PRN
Status: DISCONTINUED | OUTPATIENT
Start: 2020-06-28 | End: 2020-07-02 | Stop reason: HOSPADM

## 2020-06-28 RX ORDER — PANTOPRAZOLE SODIUM 20 MG/1
20 TABLET, DELAYED RELEASE ORAL DAILY
Status: DISCONTINUED | OUTPATIENT
Start: 2020-06-28 | End: 2020-07-02 | Stop reason: HOSPADM

## 2020-06-28 RX ORDER — SODIUM CHLORIDE 0.9 % (FLUSH) 0.9 %
10 SYRINGE (ML) INJECTION PRN
Status: DISCONTINUED | OUTPATIENT
Start: 2020-06-28 | End: 2020-07-02 | Stop reason: HOSPADM

## 2020-06-28 RX ORDER — TRIAMCINOLONE ACETONIDE 1 MG/G
CREAM TOPICAL DAILY
Status: DISCONTINUED | OUTPATIENT
Start: 2020-06-28 | End: 2020-07-02 | Stop reason: HOSPADM

## 2020-06-28 RX ORDER — ASPIRIN 81 MG/1
81 TABLET, CHEWABLE ORAL DAILY
Status: DISCONTINUED | OUTPATIENT
Start: 2020-06-28 | End: 2020-07-02 | Stop reason: HOSPADM

## 2020-06-28 RX ORDER — UBIDECARENONE 75 MG
100 CAPSULE ORAL DAILY
Status: DISCONTINUED | OUTPATIENT
Start: 2020-06-28 | End: 2020-07-02 | Stop reason: HOSPADM

## 2020-06-28 RX ORDER — FUROSEMIDE 10 MG/ML
60 INJECTION INTRAMUSCULAR; INTRAVENOUS ONCE
Status: COMPLETED | OUTPATIENT
Start: 2020-06-28 | End: 2020-06-28

## 2020-06-28 RX ORDER — PREGABALIN 75 MG/1
75 CAPSULE ORAL 2 TIMES DAILY
Status: DISCONTINUED | OUTPATIENT
Start: 2020-06-28 | End: 2020-07-02 | Stop reason: HOSPADM

## 2020-06-28 RX ORDER — ISOSORBIDE MONONITRATE 120 MG/1
120 TABLET, EXTENDED RELEASE ORAL DAILY
Status: DISCONTINUED | OUTPATIENT
Start: 2020-06-28 | End: 2020-07-02

## 2020-06-28 RX ORDER — NICOTINE POLACRILEX 4 MG
15 LOZENGE BUCCAL PRN
Status: DISCONTINUED | OUTPATIENT
Start: 2020-06-28 | End: 2020-07-02 | Stop reason: HOSPADM

## 2020-06-28 RX ORDER — POLYETHYLENE GLYCOL 3350 17 G/17G
17 POWDER, FOR SOLUTION ORAL DAILY PRN
Status: DISCONTINUED | OUTPATIENT
Start: 2020-06-28 | End: 2020-07-02 | Stop reason: HOSPADM

## 2020-06-28 RX ORDER — LANOLIN ALCOHOL/MO/W.PET/CERES
400 CREAM (GRAM) TOPICAL DAILY
Status: DISCONTINUED | OUTPATIENT
Start: 2020-06-28 | End: 2020-07-02 | Stop reason: HOSPADM

## 2020-06-28 RX ADMIN — Medication 10 ML: at 22:32

## 2020-06-28 RX ADMIN — MELATONIN 3 MG: at 22:31

## 2020-06-28 RX ADMIN — PREGABALIN 75 MG: 75 CAPSULE ORAL at 22:31

## 2020-06-28 RX ADMIN — TICAGRELOR 90 MG: 90 TABLET ORAL at 22:32

## 2020-06-28 RX ADMIN — FUROSEMIDE 60 MG: 10 INJECTION, SOLUTION INTRAMUSCULAR; INTRAVENOUS at 15:10

## 2020-06-28 RX ADMIN — FUROSEMIDE 5 MG/HR: 10 INJECTION, SOLUTION INTRAVENOUS at 22:33

## 2020-06-28 RX ADMIN — INSULIN GLARGINE 30 UNITS: 100 INJECTION, SOLUTION SUBCUTANEOUS at 22:32

## 2020-06-28 ASSESSMENT — ENCOUNTER SYMPTOMS
COUGH: 0
GASTROINTESTINAL NEGATIVE: 1
ABDOMINAL PAIN: 0
ALLERGIC/IMMUNOLOGIC NEGATIVE: 1
SPUTUM PRODUCTION: 0
WHEEZING: 0
SWOLLEN GLANDS: 0
SORE THROAT: 0
VOMITING: 0
EYES NEGATIVE: 1
HEMOPTYSIS: 0
SHORTNESS OF BREATH: 1

## 2020-06-28 ASSESSMENT — PAIN SCALES - GENERAL: PAINLEVEL_OUTOF10: 0

## 2020-06-28 ASSESSMENT — PAIN SCALES - WONG BAKER: WONGBAKER_NUMERICALRESPONSE: 0

## 2020-06-28 NOTE — H&P
History and Physical      HISTORY OF PRESENT ILLNESS:      The patient is a 56 y.o. female with significant past medical history of CKD stg4, CAD (recent PCI to LAD), recurrent hospital admission (4 since 5/30) discharged from  yesterday presenting with weight gain and LE edema. Pt reports gradual weight gain since last discharge as well as increased edema in LE and abdomen. In ED BNP is elevated, CXR with cardiomegaly. Na 126, K 5.0, BUN 72, Scr 4.1. She denies fever, chills, chest pain, cough, pain. Pt reports compliance with home medications, no change in diet. Past Medical History:        Diagnosis Date    Anxiety     CAD S/P percutaneous coronary angioplasty 2015, 2018    stents per dr Thomas Left CKD stage 4 due to type 2 diabetes mellitus ([address]     Diabetic nephropathy with proteinuria ([address] 2014    DJD (degenerative joint disease) of knee     Dr Ruma Hernandez GERD (gastroesophageal reflux disease)     Hemiparesis, left ([address] 2013    entered Ass[facility] Living ([facility])    History of heart failure     History of seizures     History of type C viral hepatitis     HTN (hypertension)     Hyperlipidemia     Impaired mobility and activities of daily living     Mediastinal lymphadenopathy 2013    Dr Chon Rosa, Hanna Oshea    Metabolic syndrome     Neurogenic urinary incontinence 2013    Neuropathy in diabetes ([address]     Obesity (BMI 30-39. 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 Stacey Beltre      x1 Dr. Tripp Bennett, Dr Octavio Gomez CATH LAB PROCEDURE  10/02/2019    HYSTERECTOMY, TOTAL ABDOMINAL      one ovary intact, Dr Benedicto Curtis, menorrhagia    TX TOTAL KNEE ARTHROPLASTY Left 2018    LEFT KNEE TOTAL KNEE ARTHROPLASTY, SHAYNA, NERVE BLOCK performed by Consuello Kehr, MD at [facility] Right     TOTAL KNEE ARTHROPLASTY  16    Dr Yoselyn Griffin         Medications Prior to Admission:    Not in a hospital admission. Allergies:  Codeine and Oxycontin [oxycodone hcl]    Social History:   Denies tobacco, ETOH or illicit drug use. Family History:       Problem Relation Age of Onset    Cancer Mother 76        survived   Jordana Ramirez Hypertension Father     Diabetes Sister     Mental Illness Sister      REVIEW OF SYSTEMS:  12 point review of systems negative unless noted above. PHYSICAL EXAM:    Vitals:  /72   Pulse 64   Temp 98.1 °F (36.7 °C)   Resp 22   Ht 5' 7\" (1.702 m)   Wt 262 lb (118.8 kg)   LMP  (LMP Unknown)   SpO2 99%   BMI 41.04 kg/m²   GEN: Alert and oriented. Morbidly obese, NAD  HEENT: Normocephalic, atraumatic. JUAN, Neck supple. No lymphadenopathy, thyromegaly or JVD. Resp: Diminished b/l, no wheezing rhonchi or rales. No respiratory distress  Cardio: RRR. No murmur  Abd: Obese abdomen. Soft, nondistended. NTTP. BS present  Ext: 2+ b.l le edema LE NTTP  Skin: No rashes, several bruises to UE in various stages of healing  Psych: Appropriate mood and affect. Neuro: A&O X3. CN II-X intact. Gait deferred.        DATA:  CBC:   Lab Results   Component Value Date    WBC 7.3 2020    RBC 3.23 2020    HGB 8.9 2020    HCT 26.2 2020    MCV 81.1 2020    MCH 27.4 2020    MCHC 33.8 2020    RDW 14.6 2020     2020    MPV 10.0 03/05/2020     BMP:    Lab Results   Component Value Date     06/28/2020    K 5.0 06/28/2020    K 4.0 06/15/2020    CL 92 06/28/2020    CO2 22 06/28/2020    BUN 72 06/28/2020    LABALBU 4.0 06/28/2020    CREATININE 4.13 06/28/2020    CALCIUM 9.8 06/28/2020    GFRAA 13.2 06/28/2020    LABGLOM 10.9 06/28/2020    GLUCOSE 71 06/28/2020     ASSESSMENT AND PLAN:      1. LUCÍA on CKD4: Strict Is&Os. Nephrology consulted. 2. Acute diastolic CHF:  Last echo EF wnl, however given recent PCI, repeat Echo. Cardiology consulted. Trend troponin. Telemetry. Home Lasix 80mg daily. Start Lasix Drip, Strict Is and Os, howard placement for accurate UO. Check  TSH. 3. CAD/ HLD: Continue home medications  4. T2DM: Continue home regimen, CCD, SS.      Code Status: Full  Lovenox

## 2020-06-28 NOTE — ED NOTES
Pt assisted to bedside commode  Pt demanding told nurse get Dr now  Pt demanding that all her band aids on her forearms be changed no bleeding noted band aids applied      Juanpablo Lozano, FLAQUITO  06/28/20 [id number]

## 2020-06-28 NOTE — ED PROVIDER NOTES
[facility] ED  eMERGENCY dEPARTMENT eNCOUnter      Pt Name: Sheldon Melo  MRN: [id number]  [place] 1960  Date of evaluation: 6/28/2020  Provider: Jhonny Parson MD    CHIEF COMPLAINT       Chief Complaint   Patient presents with    Leg Swelling      pt was at [place] for rehab and was d/c yesterday. pt states gaining 22lbs within 2weeks.  Shortness of Breath         HISTORY OF PRESENT ILLNESS   (Location/Symptom, Timing/Onset,Context/Setting, Quality, Duration, Modifying Factors, Severity)  Note limiting factors. Sheldon Melo is a 56 y.o. female who presents to the emergency department     56years old female patient with known coronary artery disease diabetes hypertension stage IV CKD and chronic CHF presented to the ER today with worsening shortness of breath bilateral lower extremity swelling feel like she is following up with fluid in the last few days despite taking 80 mg of Lasix . Was released from rehab yesterday and states she have gained 20 pounds 22 pounds in the last 2 weeks despite taking Lasix 80 mg daily denies any fever chills body ache denies any increased cough denies any other complaint or concerns     The history is provided by the patient. Shortness of Breath   Severity:  Moderate  Onset quality:  Gradual  Duration:  1 week  Timing:  Constant  Progression:  Worsening  Chronicity:  Recurrent  Context: activity    Context: not animal exposure, not emotional upset, not fumes, not known allergens, not occupational exposure, not pollens, not smoke exposure, not strong odors, not URI and not weather changes    Relieved by:  Nothing  Worsened by:   Activity  Ineffective treatments:  Diuretics  Associated symptoms: no abdominal pain, no chest pain, no claudication, no cough, no diaphoresis, no ear pain, no fever, no headaches, no hemoptysis, no neck pain, no PND, no rash, no sore throat, no sputum production, no syncope, no swollen glands, no vomiting and no wheezing    Risk factors: obesity    Risk factors: no recent alcohol use, no family hx of DVT and no hx of cancer        NursingNotes were reviewed. REVIEW OF SYSTEMS    (2-9 systems for level 4, 10 or more for level 5)     Review of Systems   Constitutional: Negative. Negative for diaphoresis and fever. HENT: Negative. Negative for ear pain and sore throat. Eyes: Negative. Respiratory: Positive for shortness of breath. Negative for cough, hemoptysis, sputum production and wheezing. Cardiovascular: Negative. Negative for chest pain, claudication, syncope and PND. Gastrointestinal: Negative. Negative for abdominal pain and vomiting. Endocrine: Negative. Genitourinary: Negative. Musculoskeletal: Negative. Negative for neck pain. Skin: Negative. Negative for rash. Allergic/Immunologic: Negative. Neurological: Negative. Negative for headaches. Hematological: Negative. Psychiatric/Behavioral: Negative. All other systems reviewed and are negative. Except as noted above the remainder of the review of systems was reviewed and negative. PAST MEDICAL HISTORY     Past Medical History:   Diagnosis Date    Anxiety     CAD S/P percutaneous coronary angioplasty 2015, 2018    stents per dr Candance Meadow CKD stage 4 due to type 2 diabetes mellitus ([facility]     Diabetic nephropathy with proteinuria ([facility] 2014    DJD (degenerative joint disease) of knee     Dr Hany Huynh GERD (gastroesophageal reflux disease)     Hemiparesis, left ([facility] 2013    [facility] Assisted Living ([facility]    History of heart failure     History of seizures     History of type C viral hepatitis     HTN (hypertension)     Hyperlipidemia     Impaired mobility and activities of daily living     Mediastinal lymphadenopathy 2013    Analilia Mejía    Metabolic syndrome     Neurogenic urinary incontinence 2013    Neuropathy in diabetes ([facility]     Obesity (BMI 30-39. 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 Artis Yousif      x1 Dr. Aamir Molina, Dr Gunderson Ano    [address] CATH LAB PROCEDURE  10/02/2019    HYSTERECTOMY, TOTAL ABDOMINAL      one ovary intact, Dr America Lock, menorrhagia    MO TOTAL KNEE ARTHROPLASTY Left 2018    LEFT KNEE TOTAL KNEE ARTHROPLASTY, SHAYNA, NERVE BLOCK performed by Amy Mccarthy MD at [address] [place] [address] Right     TOTAL KNEE ARTHROPLASTY  16    Dr Nicholas Acevedo       Previous Medications    AMLODIPINE (NORVASC) 10 MG TABLET    TAKE 1 TABLET BY MOUTH DAILY    ARIPIPRAZOLE (ABILIFY) 5 MG TABLET    Take 1 tablet by mouth daily    ASPIRIN 81 MG TABLET    Take 1 tablet by mouth daily    ATORVASTATIN (LIPITOR) 40 MG TABLET    Take 1 tablet by mouth daily    BLOOD GLUCOSE MONITORING SUPPL (FREESTYLE LITE) CORETTA    1 Device by Does not apply route daily as needed Use freestyle meter to test blood sugar as needed    CARVEDILOL (COREG) 12.5 MG TABLET    TAKE 1 TABLET BY MOUTH TWICE DAILY WITH MEALS    D3-1000 1000 UNITS CAPS    TAKE 1 CAPSULE BY MOUTH ONCE DAILY    FUROSEMIDE (LASIX) 40 MG TABLET    Take 2 tablets by mouth daily    INSULIN ASPART (NOVOLOG FLEXPEN) 100 UNIT/ML INJECTION PEN    Inject into the skin 3 times daily (before meals) 12 units TID before meals. Also use sliding scale- 1 unit for every 50 over 150.     INSULIN GLARGINE (LANTUS SOLOSTAR) 100 UNIT/ML INJECTION PEN    Inject into the skin nightly Inject 35 units once nightly    ISOSORBIDE MONONITRATE (IMDUR) 120 MG EXTENDED RELEASE TABLET    TAKE 1 TABLET BY MOUTH DAILY    MAGNESIUM OXIDE (MAG-OX) 400 MG TABLET    Take 400 mg by mouth daily    MELATONIN 3 MG TABS TABLET    TAKE 1 TABLET BY MOUTH EVERY NIGHT AS NEEDED FOR INSOMNIA    NITROGLYCERIN (NITROSTAT) 0.4 MG SL TABLET    Place 1 tablet under the tongue every 5 minutes as needed for Chest pain    NYSTATIN (NYAMYC) 583541 UNIT/GM POWDER    APPLY TO ABDOMINAL FOLDS EVERY 12 HOURS AS NEEDED    PANTOPRAZOLE (PROTONIX) 20 MG TABLET    TAKE 1 TABLET BY MOUTH DAILY    PREGABALIN (LYRICA) 75 MG CAPSULE    Give one capsule po bid    RANOLAZINE (RANEXA) 500 MG EXTENDED RELEASE TABLET    Take 1 tablet by mouth 2 times daily    SERTRALINE (ZOLOFT) 50 MG TABLET    TAKE 1 TABLET BY MOUTH DAILY    SURE COMFORT PEN NEEDLES 30G X 8 MM MISC    USE AS DIRECTED FIVE TIMES A DAY    TICAGRELOR (BRILINTA) 90 MG TABS TABLET    Take 1 tablet by mouth 2 times daily    TRIAMCINOLONE (KENALOG) 0.1 % CREAM    APPLY TO AFFECTED AREA TWICE DAILY    VITAMIN B-12 (CYANOCOBALAMIN) 100 MCG TABLET    TAKE 1 TABLET BY MOUTH DAILY       ALLERGIES     Codeine and Oxycontin [oxycodone hcl]    FAMILY HISTORY       Family History   Problem Relation Age of Onset    Cancer Mother 76        survived   Asha Reyes Hypertension Father     Diabetes Sister     Mental Illness Sister           SOCIAL HISTORY       Social History     Socioeconomic History    Marital status:      Spouse name: None    Number of children: 2    Years of education: None    Highest education level: None   Occupational History    Occupation: disabled   Social Needs    Financial resource strain: None    Food insecurity     Worry: None     Inability: None    Transportation needs     Medical: None     Non-medical: None   Tobacco Use    Smoking status: Passive Smoke Exposure - Never Smoker    Smokeless tobacco: Never Used   Substance and Sexual Activity    Alcohol use: No     Alcohol/week: 0.0 standard drinks    Drug use: No    Sexual activity: Not Currently   Lifestyle    Physical activity     Days per week: None     Minutes per session: None    Stress: None   Relationships    Social connections     Talks on phone: None     Gets together: None     Attends Taoism service: None     Active member of club or organization: None     Attends meetings of clubs or organizations: None     Relationship status: None    Intimate partner violence     Fear of current or ex partner: None     Emotionally abused: None     Physically abused: None     Forced sexual activity: None   Other Topics Concern    None   Social History Narrative    Born in [place], one of 5    Twin sister Reyes, very ill in 2018, [place] [address]    Moved to [facility], , 2 children, one son and one daughter    Worked at [employer], as a nurse's aide    Disabled due to mental illness    Lived at [employer], was discharged, returned to independent living in 2017 in the daughter's house and has adjusted well    One son and one daughter, live in the same house with patient, ItaloYOLLEGE pays the rent    Dash Hudson reading ([place])       SCREENINGS      @[id number])@      PHYSICAL EXAM    (up to 7 for level 4, 8 or more for level 5)     ED Triage Vitals   BP Temp Temp src Pulse Resp SpO2 Height Weight   06/28/20 1340 06/28/20 1334 -- 06/28/20 1334 06/28/20 1334 06/28/20 1334 06/28/20 1334 06/28/20 1334   (!) 121/57 98.1 °F (36.7 °C)  63 19 98 % 5' 7\" (1.702 m) 262 lb (118.8 kg)       Physical Exam  Constitutional:       Appearance: Normal appearance. Neck:      Musculoskeletal: Normal range of motion and neck supple. Cardiovascular:      Pulses: Normal pulses. Heart sounds: Normal heart sounds. No murmur.    Pulmonary:      Breath sounds: Examination of the right-lower field reveals rhonchi. Examination of the left-lower field reveals rhonchi. Rhonchi present. Musculoskeletal:      Right lower leg: Edema present. Left lower leg: Edema present. Neurological:      Mental Status: She is alert. DIAGNOSTIC RESULTS     EKG: All EKG's are interpreted by the Emergency Department Physician who either signs or Co-signsthis chart in the absence of a cardiologist.    EKG: Sinus rhythm left axis deviation right bundle branch block old compared to old EKG heart rate 60. RADIOLOGY:   Non-plain filmimages such as CT, Ultrasound and MRI are read by the radiologist. Plain radiographic images are visualized and preliminarily interpreted by the emergency physician with the below findings:         Interpretation per the Radiologist below, if available at the time ofthis note:    XR CHEST PORTABLE    (Results Pending)     CHF, increase pulmonary CONGESTION     ED BEDSIDE ULTRASOUND:   Performed by ED Physician - none    LABS:  Labs Reviewed   COMPREHENSIVE METABOLIC PANEL - Abnormal; Notable for the following components:       Result Value    Sodium 126 (*)     Potassium 5.0 (*)     Chloride 92 (*)     BUN 72 (*)     CREATININE 4.13 (*)     GFR Non- 10.9 (*)     GFR  13.2 (*)     ALT 34 (*)     AST 45 (*)     All other components within normal limits   CBC WITH AUTO DIFFERENTIAL - Abnormal; Notable for the following components:    RBC 3.23 (*)     Hemoglobin 8.9 (*)     Hematocrit 26.2 (*)     MCV 81.1 (*)     RDW 14.6 (*)     All other components within normal limits   TROPONIN - Abnormal; Notable for the following components:    Troponin 0.015 (*)     All other components within normal limits    Narrative:     Chuck Orona  BENOIT tel. [phone],  Trop results called to and read back by Fariba Matute RN, 06/28/2020 15:39, by  [place]    Narrative:     CALL  Jaden LARIOS tel. [phone],  Trop results called to and read back by Naman Grande RN, 06/28/2020 15:39, by  Everett Lamas       All other labs were within normal range or not returned as of this dictation. EMERGENCY DEPARTMENT COURSE and DIFFERENTIAL DIAGNOSIS/MDM:   Vitals:    Vitals:    06/28/20 1334 06/28/20 1340 06/28/20 1516   BP:  (!) 121/57 (!) 141/72   Pulse: 63  62   Resp: 19  22   Temp: 98.1 °F (36.7 °C)     SpO2: 98%  100%   Weight: 262 lb (118.8 kg)     Height: 5' 7\" (1.702 m)         MDM  Number of Diagnoses or Management Options  Acute on chronic combined systolic and diastolic congestive heart failure ([facility]):   Stage 4 chronic kidney disease [facility]):   Diagnosis management comments: 56years old with known history of coronary artery disease diabetes hypertension CKD and chronic CHF presented today with acute CHF exacerbation and worsening kidney function was given Lasix 60 IV in the ER    BMPElevated chest x-ray consistent with increased pulmonary congestion patient will be admitted under the hospitalist with a nephrology consult acute on chronic CHF with worsening kidney function       Amount and/or Complexity of Data Reviewed  Clinical lab tests: ordered and reviewed  Tests in the radiology section of CPT®: ordered and reviewed        CONSULTS:  None    PROCEDURES:  Unless otherwise noted below, none     Procedures    FINAL IMPRESSION      1. Acute on chronic combined systolic and diastolic congestive heart failure (HCC)    2. Stage 4 chronic kidney disease ([facility]          DISPOSITION/PLAN   DISPOSITION Decision To Admit 06/28/2020 04:00:52 PM      PATIENT REFERRED TO:  No follow-up provider specified.     DISCHARGE MEDICATIONS:  New Prescriptions    No medications on file          (Please note thatportions of this note were completed with a voice recognition program.  Efforts were made to edit the dictations but occasionally words are mis-transcribed.)    Carol Nixon MD (electronically signed)  Attending Emergency Physician          Aggie Randle, MD  06/28/20 0986

## 2020-06-28 NOTE — ED NOTES
EKG done and given to Dr. Martha Dorado. Pt placed on continuous cardiac monitor. Tele strip printed and mounted.      Jessica Hatfield RN  06/28/20 2767

## 2020-06-28 NOTE — ED NOTES
No change in pt condition. Skin pink w/d. resp non labored. At rest     Kyra Hatchet E. Loralie Snooks, RN  06/28/20 1693

## 2020-06-29 LAB
ANION GAP SERPL CALCULATED.3IONS-SCNC: 14 MEQ/L (ref 9–15)
BUN BLDV-MCNC: 67 MG/DL (ref 8–23)
CALCIUM SERPL-MCNC: 9.8 MG/DL (ref 8.5–9.9)
CHLORIDE BLD-SCNC: 99 MEQ/L (ref 95–107)
CO2: 21 MEQ/L (ref 20–31)
CREAT SERPL-MCNC: 3.97 MG/DL (ref 0.5–0.9)
GFR AFRICAN AMERICAN: 13.8
GFR NON-AFRICAN AMERICAN: 11.4
GLUCOSE BLD-MCNC: 103 MG/DL (ref 60–115)
GLUCOSE BLD-MCNC: 143 MG/DL (ref 70–99)
GLUCOSE BLD-MCNC: 179 MG/DL (ref 60–115)
GLUCOSE BLD-MCNC: 179 MG/DL (ref 60–115)
GLUCOSE BLD-MCNC: 83 MG/DL (ref 60–115)
GLUCOSE BLD-MCNC: 94 MG/DL (ref 60–115)
PARATHYROID HORMONE INTACT: 304.8 PG/ML (ref 15–65)
PERFORMED ON: ABNORMAL
PERFORMED ON: ABNORMAL
PERFORMED ON: NORMAL
PHOSPHORUS: 4.9 MG/DL (ref 2.3–4.8)
POTASSIUM SERPL-SCNC: 3.9 MEQ/L (ref 3.4–4.9)
SARS-COV-2, NAAT: NOT DETECTED
SODIUM BLD-SCNC: 134 MEQ/L (ref 135–144)
TROPONIN: <0.01 NG/ML (ref 0–0.01)

## 2020-06-29 PROCEDURE — 83970 ASSAY OF PARATHORMONE: CPT

## 2020-06-29 PROCEDURE — 80048 BASIC METABOLIC PNL TOTAL CA: CPT

## 2020-06-29 PROCEDURE — 6370000000 HC RX 637 (ALT 250 FOR IP): Performed by: INTERNAL MEDICINE

## 2020-06-29 PROCEDURE — 36415 COLL VENOUS BLD VENIPUNCTURE: CPT

## 2020-06-29 PROCEDURE — 99223 1ST HOSP IP/OBS HIGH 75: CPT | Performed by: INTERNAL MEDICINE

## 2020-06-29 PROCEDURE — 93010 ELECTROCARDIOGRAM REPORT: CPT | Performed by: INTERNAL MEDICINE

## 2020-06-29 PROCEDURE — U0002 COVID-19 LAB TEST NON-CDC: HCPCS

## 2020-06-29 PROCEDURE — 84100 ASSAY OF PHOSPHORUS: CPT

## 2020-06-29 PROCEDURE — 6360000002 HC RX W HCPCS: Performed by: INTERNAL MEDICINE

## 2020-06-29 PROCEDURE — 1210000000 HC MED SURG R&B

## 2020-06-29 PROCEDURE — 2580000003 HC RX 258: Performed by: INTERNAL MEDICINE

## 2020-06-29 PROCEDURE — 97162 PT EVAL MOD COMPLEX 30 MIN: CPT

## 2020-06-29 PROCEDURE — 97166 OT EVAL MOD COMPLEX 45 MIN: CPT

## 2020-06-29 RX ADMIN — Medication 10 ML: at 10:15

## 2020-06-29 RX ADMIN — INSULIN LISPRO 13 UNITS: 100 INJECTION, SOLUTION INTRAVENOUS; SUBCUTANEOUS at 17:46

## 2020-06-29 RX ADMIN — PREGABALIN 75 MG: 75 CAPSULE ORAL at 10:15

## 2020-06-29 RX ADMIN — VITAMIN D, TAB 1000IU (100/BT) 1000 UNITS: 25 TAB at 10:14

## 2020-06-29 RX ADMIN — RANOLAZINE 500 MG: 500 TABLET, FILM COATED, EXTENDED RELEASE ORAL at 02:50

## 2020-06-29 RX ADMIN — ATORVASTATIN CALCIUM 40 MG: 40 TABLET, FILM COATED ORAL at 10:15

## 2020-06-29 RX ADMIN — PREGABALIN 75 MG: 75 CAPSULE ORAL at 21:43

## 2020-06-29 RX ADMIN — INSULIN LISPRO 13 UNITS: 100 INJECTION, SOLUTION INTRAVENOUS; SUBCUTANEOUS at 12:00

## 2020-06-29 RX ADMIN — ENOXAPARIN SODIUM 30 MG: 30 INJECTION SUBCUTANEOUS at 10:24

## 2020-06-29 RX ADMIN — VITAM B12 100 MCG: 100 TAB at 10:14

## 2020-06-29 RX ADMIN — RANOLAZINE 500 MG: 500 TABLET, FILM COATED, EXTENDED RELEASE ORAL at 10:14

## 2020-06-29 RX ADMIN — PANTOPRAZOLE SODIUM 20 MG: 20 TABLET, DELAYED RELEASE ORAL at 10:14

## 2020-06-29 RX ADMIN — FUROSEMIDE 5 MG/HR: 10 INJECTION, SOLUTION INTRAVENOUS at 12:05

## 2020-06-29 RX ADMIN — ARIPIPRAZOLE 5 MG: 5 TABLET ORAL at 10:14

## 2020-06-29 RX ADMIN — MELATONIN 3 MG: at 21:46

## 2020-06-29 RX ADMIN — RANOLAZINE 500 MG: 500 TABLET, FILM COATED, EXTENDED RELEASE ORAL at 21:43

## 2020-06-29 RX ADMIN — ASPIRIN 81 MG 81 MG: 81 TABLET ORAL at 10:14

## 2020-06-29 RX ADMIN — TICAGRELOR 90 MG: 90 TABLET ORAL at 21:43

## 2020-06-29 RX ADMIN — TICAGRELOR 90 MG: 90 TABLET ORAL at 10:14

## 2020-06-29 RX ADMIN — INSULIN LISPRO 1 UNITS: 100 INJECTION, SOLUTION INTRAVENOUS; SUBCUTANEOUS at 17:47

## 2020-06-29 RX ADMIN — Medication 400 MG: at 10:15

## 2020-06-29 RX ADMIN — SERTRALINE HYDROCHLORIDE 50 MG: 50 TABLET ORAL at 10:15

## 2020-06-29 RX ADMIN — ISOSORBIDE MONONITRATE 120 MG: 120 TABLET ORAL at 10:15

## 2020-06-29 RX ADMIN — INSULIN LISPRO 1 UNITS: 100 INJECTION, SOLUTION INTRAVENOUS; SUBCUTANEOUS at 13:32

## 2020-06-29 ASSESSMENT — ENCOUNTER SYMPTOMS
VOMITING: 0
BLOOD IN STOOL: 0
CHEST TIGHTNESS: 0
EYES NEGATIVE: 1
SHORTNESS OF BREATH: 0
SHORTNESS OF BREATH: 1
GASTROINTESTINAL NEGATIVE: 1
NAUSEA: 0
DIARRHEA: 0
WHEEZING: 0
COUGH: 0
STRIDOR: 0

## 2020-06-29 ASSESSMENT — PAIN SCALES - GENERAL
PAINLEVEL_OUTOF10: 0
PAINLEVEL_OUTOF10: 0

## 2020-06-29 NOTE — CONSULTS
Renal consult   CKD-4-5   DM type-2  OHDX recurrent CHF  Anemia    Plan agree with present IV lasix await labs tomorrow may need to start dialysis discussed with patient recurrent hospitalizations

## 2020-06-29 NOTE — PROGRESS NOTES
Jerri Arana is a 56 y.o. female patient.  Pt was seen and evaluated, feeling somewhat better    Current Facility-Administered Medications   Medication Dose Route Frequency Provider Last Rate Last Dose    ARIPiprazole (ABILIFY) tablet 5 mg  5 mg Oral Daily Mildred Mclean, DO   5 mg at 06/29/20 1014    aspirin chewable tablet 81 mg  81 mg Oral Daily Mildred Mclean, DO   81 mg at 06/29/20 1014    atorvastatin (LIPITOR) tablet 40 mg  40 mg Oral Daily Mildred Vinay, DO   40 mg at 06/29/20 1015    insulin lispro (HUMALOG) injection vial 13 Units  13 Units Subcutaneous TID WC Mildred Mclean, DO        insulin glargine (LANTUS) injection vial 30 Units  30 Units Subcutaneous Nightly William Dewitt, DO   30 Units at 06/28/20 2232    isosorbide mononitrate (IMDUR) extended release tablet 120 mg  120 mg Oral Daily Mildred Vinay, DO   120 mg at 06/29/20 1015    magnesium oxide (MAG-OX) tablet 400 mg  400 mg Oral Daily Mildred Vinay, DO   400 mg at 06/29/20 1015    melatonin tablet 3 mg  3 mg Oral Nightly PRN William Dewitt, DO   3 mg at 06/28/20 2231    pantoprazole (PROTONIX) tablet 20 mg  20 mg Oral Daily Mildred Vinay, DO   20 mg at 06/29/20 1014    pregabalin (LYRICA) capsule 75 mg  75 mg Oral BID William Dewitt, DO   75 mg at 06/29/20 1015    ranolazine (RANEXA) extended release tablet 500 mg  500 mg Oral BID William Dewitt, DO   500 mg at 06/29/20 1014    sertraline (ZOLOFT) tablet 50 mg  50 mg Oral Daily Mildred Vinay, DO   50 mg at 06/29/20 1015    ticagrelor (BRILINTA) tablet 90 mg  90 mg Oral BID William Dewitt, DO   90 mg at 06/29/20 1014    vitamin B-12 (CYANOCOBALAMIN) tablet 100 mcg  100 mcg Oral Daily William Kenyonon, DO   100 mcg at 06/29/20 1014    triamcinolone (KENALOG) 0.1 % cream   Topical Daily William Kenyonon, DO        Vitamin D (CHOLECALCIFEROL) tablet 1,000 Units  1 tablet Oral Daily Mildred Mclean, DO   1,000 Units at 06/29/20 1014    glucose (GLUTOSE) 40 % oral gel 15 g  15 g Oral PRN William Dewitt DO        dextrose 50 % IV solution  12.5 g Intravenous PRN Luberta Flash, DO        glucagon (rDNA) injection 1 mg  1 mg Intramuscular PRN Luberta Flash, DO        dextrose 5 % solution  100 mL/hr Intravenous PRN Luberta Flash, DO        insulin lispro (HUMALOG) injection vial 0-6 Units  0-6 Units Subcutaneous TID WC Luberta Flash, DO        insulin lispro (HUMALOG) injection vial 0-3 Units  0-3 Units Subcutaneous Nightly Luberta Flash, DO        sodium chloride flush 0.9 % injection 10 mL  10 mL Intravenous 2 times per day Luberta Flash, DO   10 mL at 06/29/20 1015    sodium chloride flush 0.9 % injection 10 mL  10 mL Intravenous PRN Luberta Flash, DO        acetaminophen (TYLENOL) tablet 650 mg  650 mg Oral Q6H PRN Luberta Flash, DO        Or    acetaminophen (TYLENOL) suppository 650 mg  650 mg Rectal Q6H PRN Luberta Flash, DO        polyethylene glycol (GLYCOLAX) packet 17 g  17 g Oral Daily PRN Luberta Flash, DO        promethazine (PHENERGAN) tablet 12.5 mg  12.5 mg Oral Q6H PRN Luberta Flash, DO        Or    ondansetron (ZOFRAN) injection 4 mg  4 mg Intravenous Q6H PRN Luberta Flash, DO        enoxaparin (LOVENOX) injection 30 mg  30 mg Subcutaneous Daily Luberta Flash, DO   30 mg at 06/29/20 1024    potassium chloride (KLOR-CON M) extended release tablet 40 mEq  40 mEq Oral PRN Luberta Flash, DO        Or    potassium bicarb-citric acid (EFFER-K) effervescent tablet 40 mEq  40 mEq Oral PRN Luberta Flash, DO        Or    potassium chloride 10 mEq/100 mL IVPB (Peripheral Line)  10 mEq Intravenous PRN Luberta Flash, DO        potassium chloride 10 mEq/100 mL IVPB (Peripheral Line)  10 mEq Intravenous PRN Luberta Flash, DO        furosemide (LASIX) 100 mg in dextrose 5 % 100 mL infusion  5 mg/hr Intravenous Continuous Mildred Vinay, DO 5 mL/hr at 06/28/20 2233 5 mg/hr at 06/28/20 2233    magnesium sulfate 2 g in 50 mL IVPB premix  2 g Intravenous PRN Luberta Flash, DO        sodium polystyrene (KAYEXALATE) 15 GM/60ML suspension 15 g  15 g Oral Once Elroy Early, DO         Allergies   Allergen Reactions    Codeine Hives     hives    Oxycontin [Oxycodone Hcl] Hives     Active Problems:    Renal failure  Resolved Problems:    * No resolved hospital problems. *    Blood pressure 125/61, pulse 67, temperature 97.5 °F (36.4 °C), temperature source Oral, resp. rate 22, height 5' 7\" (1.702 m), weight 255 lb 12.8 oz (116 kg), SpO2 93 %, not currently breastfeeding. Subjective:  Symptoms:  She reports malaise and weakness. No shortness of breath, cough, chest pain, headache, chest pressure, anorexia, diarrhea or anxiety. Diet:  No nausea or vomiting. Objective:  General Appearance:  Comfortable, well-appearing and in no acute distress. Vital signs: (most recent): Blood pressure 125/61, pulse 67, temperature 97.5 °F (36.4 °C), temperature source Oral, resp. rate 22, height 5' 7\" (1.702 m), weight 255 lb 12.8 oz (116 kg), SpO2 93 %, not currently breastfeeding. HEENT: Normal HEENT exam.    Lungs:  Normal effort. Heart: Normal rate. Regular rhythm. S2 normal.    Abdomen: Abdomen is soft. Bowel sounds are normal.     Extremities: (+ edema)  Pulses: Distal pulses are intact. Neurological: Patient is alert. Pupils:  Pupils are equal, round, and reactive to light. Skin:  Warm and dry.       Lab Results   Component Value Date    WBC 7.3 06/28/2020    HGB 8.9 (L) 06/28/2020    HCT 26.2 (L) 06/28/2020    MCV 81.1 (L) 06/28/2020     06/28/2020     Lab Results   Component Value Date     06/28/2020    K 5.0 06/28/2020    K 4.0 06/15/2020    CL 92 06/28/2020    CO2 22 06/28/2020    BUN 72 06/28/2020    CREATININE 4.13 06/28/2020    GLUCOSE 71 06/28/2020    CALCIUM 9.8 06/28/2020        Assessment & Plan  1) LUCÍA on CKD stage 4 to 5  2) acute diastolic HF  3) CAD   4) COVID rule out  Second testing today if negative , take off isolation  C/w lasix drip  JENNI nephology and cardiology  Monitor renal function  Kameron Leon MD  6/29/2020

## 2020-06-29 NOTE — PROGRESS NOTES
Pt second Covid test sent, pt also medicated with 14 units of humalog per orders. Pt states that is her usual dose and that she always takes that at home. Pt ate entire dinner this evening. Will continue to monitor.

## 2020-06-29 NOTE — PROGRESS NOTES
Nutrition Education    Type and Reason for Visit:     Nutrition Assessment:  Consult received for CHF education.  pt currently in isolation for r/o COIVD-19, will follow up for d/c needs  ·     Electronically signed by Coral Chapman RD, LD on 6/29/20 at 12:48 PM EDT

## 2020-06-29 NOTE — PROGRESS NOTES
[facility] OCCUPATIONAL THERAPY EVALUATION - ACUTE     NAME: Erick Muñoz  : 1960 (60 y.o.)  MRN: [id number]  CODE STATUS: Full Code  Room: [place] [address]    Date of Service: 2020    Patient Diagnosis(es): Renal failure [N19]   Chief Complaint   Patient presents with    Leg Swelling      pt was at [facility] for rehab and was d/c yesterday. pt states gaining 22lbs within 2weeks.     Shortness of Breath     Patient Active Problem List    Diagnosis Date Noted    Renal failure 2020    Recurrent falls 2020    Chest pain 2020    Edema 2019    Closed supracondylar fracture of right humerus 2019    Other chronic pain 2019    Palliative care patient 2019    Obesity, Class III, BMI 40-49.9 (morbid obesity) ([address] 2019    Sleep apnea 2019    Pulmonary hypertension ([address]     Diastolic congestive heart failure ([address] 10/04/2019    Uncontrolled type 2 diabetes mellitus with hyperglycemia (HCC)     Shortness of breath 10/02/2019    Tardive dyskinesia 2019    Chronic painful diabetic neuropathy (HCC)     CKD (chronic kidney disease) stage 4, GFR 15-29 ml/min (HCC) 2018    Angina, class II ([address]     Stented coronary artery-plan is to stay on Plavix indefinately per Dr Marycruz Giles 2016    History of seizures     Urinary incontinence due to cognitive impairment     HTN (hypertension)     History of type C viral hepatitis     Diabetic nephropathy with proteinuria ([address]     Incontinence of urine 2014    Vitamin D insufficiency 2014    Vitamin B 12 deficiency 2013    Cerebral microvascular disease 2013    Hemiparesis, left ([address] 2013    Coronary artery disease involving native heart with angina pectoris (HCC)     Schizophrenia, paranoid, chronic     Metabolic syndrome     Mixed hyperlipidemia 2010    Other hammer toe (acquired) 2007        Past Medical History:   Diagnosis Date    Safety Devices  Safety Devices in place: Yes  Type of devices: All fall risk precautions in place   Initially in place: No    Subjective: \"There is always someone with me. \"       Pain Reassessment: 0/10 reported. Prior Level of Function:  Social/Functional History  Lives With: Daughter  Type of Home: House  Home Layout: Two level, 1/2 bath on main level(full bathroom upstairs, bedroom on main level)  Home Access: Ramped entrance  Bathroom Shower/Tub: Tub/Shower unit(goes upstairs about 1-2x/wk for showers, dtr assists)  Bathroom Equipment: Grab bars in shower, Shower chair  Home Equipment: Rolling walker  ADL Assistance: Needs assistance  Homemaking Assistance: Needs assistance  Ambulation Assistance: Independent(WW)  Transfer Assistance: Independent  Active : No  Additional Comments: was at [facility] for rehab, returned home for 1 day prior to re-admission to hospital    OBJECTIVE:     Orientation Status:  Orientation  Overall Orientation Status: Within Functional Limits    Observation:  Observation/Palpation  Posture: Fair  Observation: mild flexion noted which patient attempts to correct    Cognition Status:  Cognition  Cognition Comment: follows one step commands consistently    Perception Status:  Perception  Overall Perceptual Status: WFL    Sensation Status:  Sensation  Overall Sensation Status: Impaired  Light Touch: Partial deficits in the LLE, Partial deficits in the RLE    Vision and Hearing Status:  Vision  Vision: Impaired  Vision Exceptions: Wears glasses at all times  Hearing  Hearing: Within functional limits     ROM:   LUE AROM (degrees)  LUE AROM : WFL  Left Hand AROM (degrees)  Left Hand AROM: WFL  RUE AROM (degrees)  RUE AROM : WFL  Right Hand AROM (degrees)  Right Hand AROM: WFL    Strength:  LUE Strength  Gross LUE Strength: WFL  L Hand General: 4-/5  RUE Strength  Gross RUE Strength: WFL  R Hand General: 4-/5    Coordination, Tone, Quality of Movement:    Tone RUE  RUE Tone: Normotonic  Tone LUE  LUE Tone: Normotonic  Coordination  Movements Are Fluid And Coordinated: No  Coordination and Movement description: Decreased speed, Left UE, Right UE    Hand Dominance:  Hand Dominance  Hand Dominance: Right    ADL Status:  ADL  Feeding: Unable to assess(comment)  Grooming: Setup  UE Bathing: Setup  LE Bathing: Minimal assistance  UE Dressing: Setup  LE Dressing: Minimal assistance  Toileting: Unable to assess(comment)          Therapy key for assistance levels -   Independent = Pt. is able to perform task with no assistance but may require a device   Stand by assistance = Pt. does not perform task at an independent level but does not need physical assistance, requires verbal cues  Minimal, Moderate, Maximal Assistance = Pt. requires physical assistance (25%, 50%, 75% assist from helper) for task but is able to actively participate in task   Dependent = Pt. requires total assistance with task and is not able to actively participate with task completion     Functional Mobility:     Transfers  Sit to stand: Supervision  Stand to sit: Supervision    Bed Mobility  Bed mobility  Supine to Sit: Supervision    Seated and Standing Balance:  Balance  Sitting Balance: Supervision  Standing Balance: Supervision    Functional Endurance:  Activity Tolerance  Activity Tolerance: Patient Tolerated treatment well  Activity Tolerance: fair-    D/C Recommendations:  OT D/C RECOMMENDATIONS  REQUIRES OT FOLLOW UP: Yes    Equipment Recommendations:       OT Education:        OT Follow Up:  OT D/C RECOMMENDATIONS  REQUIRES OT FOLLOW UP: Yes       Assessment/Discharge Disposition:     Performance deficits / Impairments: Decreased ADL status, Decreased strength  Prognosis: Fair  Discharge Recommendations: Continue to assess pending progress  Decision Making: Medium Complexity  History: multiple  Exam: 2 deficits  Assistance / Modification: Min A    Six Click Score    How much help for putting on and taking off regular lower body clothing?: A Little  How much help for Bathing?: A Little  How much help for Toileting?: None  How much help for putting on and taking off regular upper body clothing?: None  How much help for taking care of personal grooming?: None  How much help for eating meals?: None  AM-[facility] Inpatient Daily Activity Raw Score: 22  AM-PAC Inpatient ADL T-Scale Score : 47.1  ADL Inpatient CMS 0-100% Score: 25.8    Plan:  Plan  Times per week: 1-4x/wk  Current Treatment Recommendations: Self-Care / ADL, Strengthening    Goals:   Patient will:    - Be SBA in LB ADLs  - Improve bilateral UE strength and endurance to FAir+ in order to participate in self-care activities as projected. - Access appropriate D/C site with as few architectural barriers as possible. Patient Goal: Patient goals :  To return to home with family      Discussed and agreed upon: Yes Comments:     Therapy Time:   OT Individual Minutes  Time In: 1436  Time Out: [facility]  Minutes: 15    Electronically signed by:    CATHY Downs  9/52/4065, 3:56 PM Electronically signed by CATHY Downs on 8/66/32 at 3:43 PM EDT

## 2020-06-29 NOTE — CONSULTS
Nicki Goel [address] [place] [address], [id number] [place] [address]                                  CONSULTATION    PATIENT NAME: Unique Taylor                  :        1960  MED REC NO:   [id number]                            ROOM:       W595  ACCOUNT NO:   [de-identified]                           ADMIT DATE: 2020  PROVIDER:     Kavya Oliva DO    CONSULT DATE:  2020    RENAL CONSULT    HISTORY OF PRESENT ILLNESS:  A 55-year-old  female admitted to  the hospital with shortness of breath and swelling of the ankles and  legs. The patient has had multiple admissions for pulmonary edema - CHF  in the past, and on 2020, her serum creatinine was 2.8 with a GFR  of 17 mL per minute. She arrives to the hospital with a serum  creatinine of 4 and a GFR of 11 mL per minute. She was mildly  hyponatremic with hyperkalemia at 5.0 mEq. The patient has chronic  anemia, presently her hemoglobin 8.9. She has known CKD IV-V following  in the office. She has a long history of diabetes, hypertension,  hyperlipidemia, and organic heart disease. At this time, the patient  denies any dysuria, gross hematuria, or use of nonsteroidal  anti-inflammatories. PAST MEDICAL HISTORY:  CKD IV-V, diabetes mellitus type 2, hypertension,  hyperlipidemia, organic heart disease, history of pulmonary  hypertension, history of seizures, and history of hepatitis C. PAST SURGICAL HISTORY:  Cardiac catheterization, colonoscopy, ,  hysterectomy, right first toe amputation, and left knee replacement. FAMILY HISTORY:  Noncontributory. HABITS:  Never smoked. No alcohol or opioids. MEDICATIONS:  At the time of her admission to the hospital would be;  Nitrostat, Lyrica, vitamin D, mag oxide, Lipitor, Coreg, Norvasc,  Protonix, Imdur, Abilify, Ranexa, Lasix, Zoloft, insulin coverage, and  Brilinta.     ALLERGIES TO MEDICATIONS:  CODEINE and

## 2020-06-29 NOTE — PROGRESS NOTES
Pt in bed resting. Admission documented. Lung sounds diminished bilaterally. No cough noted at this time. Afebrile, VSS.  A&O. 18 Fr howard placed 700 clear yellow urine out, she tolerated well. Lasix infusing per MAR. Medicated per MAR. VSS, afebrile. A&O. No needs at this time. Will continue to monitor.  Electronically signed by Tania Petersen RN on 6/28/2020 at 11:54 PM

## 2020-06-29 NOTE — PROGRESS NOTES
Physical Therapy Med Surg Initial Assessment  Facility/Department: [facility]  Room: [facility]/N517-33       NAME: Nivia Braun  : 1960 (72 y.o.)  MRN: [id number]  CODE STATUS: Full Code    Date of Service: 2020    Patient Diagnosis(es): Renal failure [N19]   Chief Complaint   Patient presents with    Leg Swelling      pt was at [facility] for rehab and was d/c yesterday. pt states gaining 22lbs within 2weeks.     Shortness of Breath     Patient Active Problem List    Diagnosis Date Noted    Renal failure 2020    Recurrent falls 2020    Chest pain 2020    Edema 2019    Closed supracondylar fracture of right humerus 2019    Other chronic pain 2019    Palliative care patient 2019    Obesity, Class III, BMI 40-49.9 (morbid obesity) ([address] 2019    Sleep apnea 2019    Pulmonary hypertension ([address]     Diastolic congestive heart failure ([address] 10/04/2019    Uncontrolled type 2 diabetes mellitus with hyperglycemia (HCC)     Shortness of breath 10/02/2019    Tardive dyskinesia 2019    Chronic painful diabetic neuropathy (HCC)     CKD (chronic kidney disease) stage 4, GFR 15-29 ml/min (HCC) 2018    Angina, class II ([address]     Stented coronary artery-plan is to stay on Plavix indefinately per Dr Duane Scott 2016    History of seizures     Urinary incontinence due to cognitive impairment     HTN (hypertension)     History of type C viral hepatitis     Diabetic nephropathy with proteinuria ([address]     Incontinence of urine 2014    Vitamin D insufficiency 2014    Vitamin B 12 deficiency 2013    Cerebral microvascular disease 2013    Hemiparesis, left ([address] 2013    Coronary artery disease involving native heart with angina pectoris (HCC)     Schizophrenia, paranoid, chronic     Metabolic syndrome     Mixed hyperlipidemia 2010    Other hammer toe (acquired) 2007        Past Medical History:   Diagnosis Date    Anxiety     CAD S/P percutaneous coronary angioplasty , 2018    stents per dr Sidney Walter CKD stage 4 due to type 2 diabetes mellitus ([facility]     Diabetic nephropathy with proteinuria ([address]     DJD (degenerative joint disease) of knee     Dr Karen De Santiago GERD (gastroesophageal reflux disease)     Hemiparesis, left ([address]     [facility]    History of heart failure     History of seizures     History of type C viral hepatitis     HTN (hypertension)     Hyperlipidemia     Impaired mobility and activities of daily living     Mediastinal lymphadenopathy     [facility] [place]    Metabolic syndrome     Neurogenic urinary incontinence 2013    Neuropathy in diabetes ([address]     Obesity (BMI 30-39. 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 Salvador Bond      x1 Dr. Diaz, Dr Angelito Amezquita CATH LAB PROCEDURE  10/02/2019    HYSTERECTOMY, TOTAL ABDOMINAL      one ovary intact, Dr Mcmahan Pod, menorrhagia    [place] TOTAL KNEE ARTHROPLASTY Left 2018    LEFT KNEE TOTAL KNEE ARTHROPLASTY, SHAYNA, NERVE BLOCK performed by Dav Avalos MD at [address] [place] [address] Right     TOTAL KNEE ARTHROPLASTY  16    Dr Tobias Olivarez       Chart Reviewed: Yes  Patient assessed for rehabilitation services?: Yes  Family / Caregiver Present: No  General Comment  Comments: Pt awake in bed, agreeable to PT eval.    Restrictions:  Restrictions/Precautions: Fall Risk  Position Activity Restriction  Other position/activity restrictions: droplet (+) precautions     SUBJECTIVE:      Pain  Pre Treatment Pain Screening  Pain at present: 0    Post Treatment Pain Screening:   Pain Assessment  Pain Level: 0    Prior Level of Function:  Social/Functional History  Lives With: Daughter  Type of Home: House  Home Layout: Two level, 1/2 bath on main level(full bathroom upstairs, bedroom on main level)  Home Access: Ramped entrance  Bathroom Shower/Tub: Tub/Shower unit(goes upstairs about 1-2x/wk for showers, dtr assists)  Bathroom Equipment: Grab bars in shower, Shower chair  Home Equipment: Rolling walker  ADL Assistance: Needs assistance  Homemaking Assistance: Needs assistance  Ambulation Assistance: Independent(WW)  Transfer Assistance: Independent  Active : No  Additional Comments: was at [email or web address] for rehab, returned home for 1 day prior to re-admission to hospital    OBJECTIVE:   Vision: Impaired  Vision Exceptions: Wears glasses at all times  Hearing: Within functional limits    Cognition:  Overall Orientation Status: Within Functional Limits  Follows Commands: Within Functional Limits    Observation/Palpation  Observation: pt has multiple bruises on UEs and on chest, pt states \"It's because of my kidneys\" denying falls    ROM:  RLE AROM: WFL  LLE AROM : WFL    Strength:  Strength RLE  Strength RLE: WFL  Strength LLE  Comment: hip 4-/5, knee and ankle WFL    Neuro:  Balance  Sitting - Static: Good  Sitting - Dynamic: Good  Standing - Static: Fair  Standing - Dynamic: Fair        Sensation  Overall Sensation Status: Impaired(reports neuropathy in B hands and feet)    Bed mobility  Supine to Sit: Supervision    Transfers  Sit to Stand: Stand by assistance  Stand to sit: HEP    [facility] (6 CLICK) BASIC MOBILITY  AM-PAC Inpatient Mobility Raw Score : 19     Therapy Time:   Individual   Time In 8948   Time Out 1530   Minutes [place]/Juan Manuel Mckinney, 06/29/20 at 3:47 PM         Definitions for assistance levels  Independent = pt does not require any physical supervision or assistance from another person for activity completion. Device may be needed.   Stand by assistance = pt requires verbal cues or instructions from another person, close to but not touching, to perform the activity  Minimal assistance= pt performs 75% or more of the activity; assistance is required to complete the activity  Moderate assistance= pt performs 50% of the activity; assistance is required to complete the activity  Maximal assistance = pt performs 25% of the activity; assistance is required to complete the activity  Dependent = pt requires total physical assistance to accomplish the task

## 2020-06-29 NOTE — CARE COORDINATION
[facility] Case Management Initial Discharge Assessment    Met with patient on phone(due to isolation) to discuss discharge plan. PCP: Monika Cooper MD                                Date of Last Visit: 6/25/20    If no PCP, list provided? N/A    Discharge Planning    Living Arrangements: at home dependent on family care    Who do you live with? Daughter and 3 teenage grandchildren    Who helps you with your care:  family    If lives at home:     Do you have any barriers navigating in your home? no    Patient can perform ADL? No    Current Services (outpatient and in home) :  [facility]), Palliative Care ([address] [facility]) and [address] [facility] Help/Aides ([address] [facility] [address] through Mervat Cesar is aide)    Dialysis: No    Is transportation available to get to your appointments? Yes    DME Equipment:  yes - Walker    Respiratory equipment: None    Respiratory provider:  no     Pharmacy:  yes - [facility]    Consult with Medication Assistance Program?  No      Patient agreeable to [address]? Yes, [address]    Patient agreeable to SNF/Rehab? No    Other discharge needs identified? N/A-But follow for possible renal needs    Freedom of choice list provided with basic dialogue that supports the patient's individualized plan of care/goals and shares the quality data associated with the providers. Yes    Does Patient Have a High-Risk for Readmission Diagnosis (CHF, PN, MI, COPD)? No    The plan for Transition of Care is related to the following treatment goals:Resolve    Initial Discharge Plan? (Note: please see concurrent daily documentation for any updates after initial note). Pt states she was at [facility] and discharged 6/27. She states she is current with [facility] and Palliative Care. Her daughter is her Waiver Aide through [employer]. Pt plans to return to home with the same and declined DC to SNF.     The Patient and/or patient representative: patient was provided with choice of any post-acute providers for care and equipment and agrees with discharge plan  Yes    Electronically signed by HIPOLITO Hayes on 6/29/2020 at 3:11 PM

## 2020-06-29 NOTE — CONSULTS
Consults    Patient Name: Marilia Agustin Date: 2020  1:39 PM  MR #: [id number]  : 1960    Attending Physician: Cheryle Median, MD  Reason for consult: HF    History of Presenting Illness:      Mj Avila is a 56 y.o. female on hospital day 1 with a history of . History Obtained From:  patient, electronic medical record    Pt admitted from NH for weight gain and volume overload. She admits to being more SOB. She has been compliant with her meds at the [facility]. She denies CP. She has had multiple recent admissions    She diuresed near 3L overnight on Lasix gtt. She has advanced CKD      History:      EKG:  Past Medical History:   Diagnosis Date    Anxiety     CAD S/P percutaneous coronary angioplasty ,     stents per dr Shweta Salinas CKD stage 4 due to type 2 diabetes mellitus ([address]     Diabetic nephropathy with proteinuria ([address]     DJD (degenerative joint disease) of knee     Dr Chacha Tijerina GERD (gastroesophageal reflux disease)     Hemiparesis, left ([address] 2013    [facility] Ass[facility] Living ([facility])    History of heart failure     History of seizures     History of type C viral hepatitis     HTN (hypertension)     Hyperlipidemia     Impaired mobility and activities of daily living     Mediastinal lymphadenopathy 2013    Dusty Hanson Right    Metabolic syndrome     Neurogenic urinary incontinence 2013    Neuropathy in diabetes ([address]     Obesity (BMI 30-39. 9)     Recurrent UTI     S/P colonoscopy     [facility], focal active colitis    Schizophrenia, paranoid, chronic ([address]     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)     Type 2 diabetes mellitus with renal manifestations, controlled ([address] 2015    Insulin dependent, Dr Jonathan Watkins Urinary incontinence due to cognitive organizations: Not on file     Relationship status: Not on file    Intimate partner violence     Fear of current or ex partner: Not on file     Emotionally abused: Not on file     Physically abused: Not on file     Forced sexual activity: Not on file   Other Topics Concern    Not on file   Social History Narrative    Born in [place], one of 5    Twin sister Reyes, very ill in 2018, [place] [address]    Moved to [facility], , 2 children, one son and one daughter    Worked at [employer], as a nurse's aide    Disabled due to mental illness    Lived at [employer], was discharged, returned to independent living in 2017 in the daughter's house and has adjusted well    One son and one daughter, live in the same house with patient, Sammie Vizcarra pays the rent    [employer] reading (misteries)      [] Unable to obtain due to ventilated and/ or neurologic status      Home Medications:      Medications Prior to Admission: triamcinolone (KENALOG) 0.1 % cream, APPLY TO AFFECTED AREA TWICE DAILY  sertraline (ZOLOFT) 50 MG tablet, TAKE 1 TABLET BY MOUTH DAILY  nystatin (NYAMYC) 486701 UNIT/GM powder, APPLY TO ABDOMINAL FOLDS EVERY 12 HOURS AS NEEDED  ticagrelor (BRILINTA) 90 MG TABS tablet, Take 1 tablet by mouth 2 times daily  insulin aspart (NOVOLOG FLEXPEN) 100 UNIT/ML injection pen, Inject into the skin 3 times daily (before meals) 12 units TID before meals. Also use sliding scale- 1 unit for every 50 over 150.   insulin glargine (LANTUS SOLOSTAR) 100 UNIT/ML injection pen, Inject into the skin nightly Inject 35 units once nightly  Blood Glucose Monitoring Suppl (FREESTYLE LITE) CORETTA, 1 Device by Does not apply route daily as needed Use freestyle meter to test blood sugar as needed  furosemide (LASIX) 40 MG tablet, Take 2 tablets by mouth daily  ranolazine (RANEXA) 500 MG extended release tablet, Take 1 tablet by mouth 2 times daily  ARIPiprazole (ABILIFY) 5 MG tablet, Take 1 tablet by mouth daily  isosorbide mononitrate (IMDUR) 120 MG extended release tablet, TAKE 1 TABLET BY MOUTH DAILY  pantoprazole (PROTONIX) 20 MG tablet, TAKE 1 TABLET BY MOUTH DAILY  amLODIPine (NORVASC) 10 MG tablet, TAKE 1 TABLET BY MOUTH DAILY  carvedilol (COREG) 12.5 MG tablet, TAKE 1 TABLET BY MOUTH TWICE DAILY WITH MEALS  aspirin 81 MG tablet, Take 1 tablet by mouth daily  atorvastatin (LIPITOR) 40 MG tablet, Take 1 tablet by mouth daily  SURE COMFORT PEN NEEDLES 30G X 8 MM MISC, USE AS DIRECTED FIVE TIMES A DAY  melatonin 3 MG TABS tablet, TAKE 1 TABLET BY MOUTH EVERY NIGHT AS NEEDED FOR INSOMNIA  magnesium oxide (MAG-OX) 400 MG tablet, Take 400 mg by mouth daily  vitamin B-12 (CYANOCOBALAMIN) 100 MCG tablet, TAKE 1 TABLET BY MOUTH DAILY  D3-1000 1000 units CAPS, TAKE 1 CAPSULE BY MOUTH ONCE DAILY  pregabalin (LYRICA) 75 MG capsule, Give one capsule po bid  nitroGLYCERIN (NITROSTAT) 0.4 MG SL tablet, Place 1 tablet under the tongue every 5 minutes as needed for Chest pain    Current Hospital Medications:     Scheduled Meds:   ARIPiprazole  5 mg Oral Daily    aspirin  81 mg Oral Daily    atorvastatin  40 mg Oral Daily    insulin lispro  13 Units Subcutaneous TID WC    insulin glargine  30 Units Subcutaneous Nightly    isosorbide mononitrate  120 mg Oral Daily    magnesium oxide  400 mg Oral Daily    pantoprazole  20 mg Oral Daily    pregabalin  75 mg Oral BID    ranolazine  500 mg Oral BID    sertraline  50 mg Oral Daily    ticagrelor  90 mg Oral BID    vitamin B-12  100 mcg Oral Daily    triamcinolone   Topical Daily    Vitamin D  1 tablet Oral Daily    insulin lispro  0-6 Units Subcutaneous TID     insulin lispro  0-3 Units Subcutaneous Nightly    sodium chloride flush  10 mL Intravenous 2 times per day    enoxaparin  30 mg Subcutaneous Daily    sodium polystyrene  15 g Oral Once     Continuous Infusions:   dextrose      furosemide (LASIX) 1mg/ml infusion 5 mg/hr (06/28/20 2237)     PRN Meds:.melatonin, glucose, dextrose, glucagon (rDNA), dextrose, sodium chloride flush, acetaminophen **OR** acetaminophen, polyethylene glycol, promethazine **OR** ondansetron, potassium chloride **OR** potassium alternative oral replacement **OR** potassium chloride, potassium chloride, magnesium sulfate  .  dextrose      furosemide (LASIX) 1mg/ml infusion 5 mg/hr (06/28/20 1106)        Allergies: Allergies   Allergen Reactions    Codeine Hives     hives    Oxycontin [Oxycodone Hcl] Hives        Review of Systems:       Review of Systems   Constitutional: Negative. Negative for diaphoresis and fatigue. HENT: Negative. Eyes: Negative. Respiratory: Positive for shortness of breath. Negative for cough, chest tightness, wheezing and stridor. Cardiovascular: Negative. Negative for chest pain, palpitations and leg swelling. Gastrointestinal: Negative. Negative for blood in stool and nausea. Genitourinary: Negative. Musculoskeletal: Positive for arthralgias and gait problem. Skin: Negative. Neurological: Positive for weakness. Negative for dizziness, syncope and light-headedness. Hematological: Negative. Psychiatric/Behavioral: Negative. Objective Findings:     Vitals:/61   Pulse 67   Temp 97.5 °F (36.4 °C) (Oral)   Resp 22   Ht 5' 7\" (1.702 m)   Wt 255 lb 12.8 oz (116 kg)   LMP  (LMP Unknown)   SpO2 93%   BMI 40.06 kg/m²      Physical Examination:    Physical Exam   Constitutional: No distress. She appears chronically ill and acutely ill. HENT:   Normal cephalic and Atraumatic   Eyes: Pupils are equal, round, and reactive to light. Neck: Normal range of motion and thyroid normal. Neck supple. No JVD present. No neck adenopathy. No thyromegaly present. Cardiovascular: Normal rate, regular rhythm, intact distal pulses and normal pulses. Murmur heard. Pulmonary/Chest: Effort normal. She has no wheezes. She has bibasilar rales. She exhibits no tenderness. Abdominal: Soft.  Bowel sounds are normal. There is no abdominal tenderness. Musculoskeletal: Normal range of motion. General: Edema present. No tenderness. Neurological: She is alert and oriented to person, place, and time. Skin: Skin is warm. No cyanosis. Nails show no clubbing. Results/ Medications reviewed 6/29/2020, 11:59 AM     Laboratory, Microbiology, Pathology, Radiology, Cardiology, Medications and Transcriptions reviewed  Scheduled Meds:   ARIPiprazole  5 mg Oral Daily    aspirin  81 mg Oral Daily    atorvastatin  40 mg Oral Daily    insulin lispro  13 Units Subcutaneous TID WC    insulin glargine  30 Units Subcutaneous Nightly    isosorbide mononitrate  120 mg Oral Daily    magnesium oxide  400 mg Oral Daily    pantoprazole  20 mg Oral Daily    pregabalin  75 mg Oral BID    ranolazine  500 mg Oral BID    sertraline  50 mg Oral Daily    ticagrelor  90 mg Oral BID    vitamin B-12  100 mcg Oral Daily    triamcinolone   Topical Daily    Vitamin D  1 tablet Oral Daily    insulin lispro  0-6 Units Subcutaneous TID WC    insulin lispro  0-3 Units Subcutaneous Nightly    sodium chloride flush  10 mL Intravenous 2 times per day    enoxaparin  30 mg Subcutaneous Daily    sodium polystyrene  15 g Oral Once     Continuous Infusions:   dextrose      furosemide (LASIX) 1mg/ml infusion 5 mg/hr (06/28/20 2233)       Recent Labs     06/28/20  1400   WBC 7.3   HGB 8.9*   HCT 26.2*   MCV 81.1*        Recent Labs     06/28/20  1400   *   K 5.0*   CL 92*   CO2 22   BUN 72*   CREATININE 4.13*     Recent Labs     06/28/20  1400   AST 45*   ALT 34*   BILITOT 0.5   ALKPHOS 105     No results for input(s): LIPASE, AMYLASE in the last 72 hours. Recent Labs     06/28/20  1400   PROT 6.9     Xr Chest Portable    Result Date: 6/28/2020  EXAMINATION: XR CHEST PORTABLE CLINICAL HISTORY: SHORTNESS OF BREATH. WEIGHT INCREASING.  COMPARISONS: CHEST RADIOGRAPH, JUNE 14, 2020, KAREEM 10, 2020 FINDINGS: Osseous structures intact. Cardiopericardial silhouette upper limits normal. Pulmonary vasculature normal. Lungs clear. BORDERLINE CARDIOMEGALY. Xr Chest Portable    Result Date: 6/15/2020  EXAMINATION: Portable AP ERECT view of the chest. CLINICAL HISTORY: Short of breath since this morning. History chronic kidney disease. Cardiac stents placed 2 days ago. Diabetic. DATE: 6/14/2020 8:00 PM COMPARISONS: 6/10/2020 FINDINGS: There is borderline cardiac enlargement, unchanged. There is mild coarsening of interstitial markings, but unchanged. No evidence of acute infiltrate. No pleural effusion or pneumothorax. There are mild degenerative changes in spine. NO ACUTE CARDIOPULMONARY ABNORMALITY. NO CHANGE. MILD CHRONIC COARSENING OF INTERSTITIAL MARKINGS. Xr Chest Portable    Result Date: 6/11/2020  EXAMINATION: XR CHEST PORTABLE DATE AND TIME:6/10/2020 5:30 PM CLINICAL HISTORY: Shortness of breath   chest pain  COMPARISONS: May 26, 2020  FINDINGS: The heart, mediastinum and pulmonary vasculature are within normal limits. Visualized lung fields are clear. Bones unremarkable. NO ACTIVE LUNG DISEASE. Active Hospital Problems    Diagnosis Date Noted    Renal failure [N19] 06/28/2020     Priority: Low         Impression/Plan:   1. Advanced CKD - diuresing well. Continue same. She likely will need HD at some point  2. CAD- no cp. RECENT lad miya ON dapt  3. LVEF 60%  HTN Stable     Thank you for allowing us to participate in the care of this patient. Will continue to follow. Please call if questions or concerns arise.     Electronically signed by Deuce Farrell MD on 6/29/2020 at 11:59 AM

## 2020-06-30 LAB
ANION GAP SERPL CALCULATED.3IONS-SCNC: 15 MEQ/L (ref 9–15)
BUN BLDV-MCNC: 63 MG/DL (ref 8–23)
CALCIUM SERPL-MCNC: 9.3 MG/DL (ref 8.5–9.9)
CHLORIDE BLD-SCNC: 99 MEQ/L (ref 95–107)
CO2: 23 MEQ/L (ref 20–31)
CREAT SERPL-MCNC: 4.01 MG/DL (ref 0.5–0.9)
GFR AFRICAN AMERICAN: 13.7
GFR NON-AFRICAN AMERICAN: 11.3
GLUCOSE BLD-MCNC: 106 MG/DL (ref 60–115)
GLUCOSE BLD-MCNC: 110 MG/DL (ref 70–99)
GLUCOSE BLD-MCNC: 134 MG/DL (ref 60–115)
GLUCOSE BLD-MCNC: 157 MG/DL (ref 60–115)
GLUCOSE BLD-MCNC: 265 MG/DL (ref 60–115)
HEPATITIS B SURFACE ANTIGEN INTERPRETATION: NORMAL
HEPATITIS C ANTIBODY INTERPRETATION: REACTIVE
LV EF: 60 %
LVEF MODALITY: NORMAL
MAGNESIUM: 2 MG/DL (ref 1.7–2.4)
PERFORMED ON: ABNORMAL
PERFORMED ON: NORMAL
POTASSIUM SERPL-SCNC: 3.9 MEQ/L (ref 3.4–4.9)
SODIUM BLD-SCNC: 137 MEQ/L (ref 135–144)

## 2020-06-30 PROCEDURE — 6370000000 HC RX 637 (ALT 250 FOR IP): Performed by: INTERNAL MEDICINE

## 2020-06-30 PROCEDURE — 97116 GAIT TRAINING THERAPY: CPT

## 2020-06-30 PROCEDURE — 36415 COLL VENOUS BLD VENIPUNCTURE: CPT

## 2020-06-30 PROCEDURE — 94664 DEMO&/EVAL PT USE INHALER: CPT

## 2020-06-30 PROCEDURE — 2580000003 HC RX 258: Performed by: INTERNAL MEDICINE

## 2020-06-30 PROCEDURE — 83735 ASSAY OF MAGNESIUM: CPT

## 2020-06-30 PROCEDURE — 1210000000 HC MED SURG R&B

## 2020-06-30 PROCEDURE — 93306 TTE W/DOPPLER COMPLETE: CPT

## 2020-06-30 PROCEDURE — 86704 HEP B CORE ANTIBODY TOTAL: CPT

## 2020-06-30 PROCEDURE — 6360000002 HC RX W HCPCS: Performed by: INTERNAL MEDICINE

## 2020-06-30 PROCEDURE — 87340 HEPATITIS B SURFACE AG IA: CPT

## 2020-06-30 PROCEDURE — 80048 BASIC METABOLIC PNL TOTAL CA: CPT

## 2020-06-30 PROCEDURE — 5A1D70Z PERFORMANCE OF URINARY FILTRATION, INTERMITTENT, LESS THAN 6 HOURS PER DAY: ICD-10-PCS | Performed by: INTERNAL MEDICINE

## 2020-06-30 PROCEDURE — 86803 HEPATITIS C AB TEST: CPT

## 2020-06-30 PROCEDURE — 99233 SBSQ HOSP IP/OBS HIGH 50: CPT | Performed by: INTERNAL MEDICINE

## 2020-06-30 PROCEDURE — 94640 AIRWAY INHALATION TREATMENT: CPT

## 2020-06-30 RX ORDER — HEPARIN SODIUM 1000 [USP'U]/ML
4000 INJECTION, SOLUTION INTRAVENOUS; SUBCUTANEOUS ONCE
Status: COMPLETED | OUTPATIENT
Start: 2020-06-30 | End: 2020-07-01

## 2020-06-30 RX ORDER — HEPARIN SODIUM 1000 [USP'U]/ML
2000 INJECTION, SOLUTION INTRAVENOUS; SUBCUTANEOUS ONCE
Status: COMPLETED | OUTPATIENT
Start: 2020-06-30 | End: 2020-07-02

## 2020-06-30 RX ORDER — IPRATROPIUM BROMIDE AND ALBUTEROL SULFATE 2.5; .5 MG/3ML; MG/3ML
1 SOLUTION RESPIRATORY (INHALATION) EVERY 4 HOURS PRN
Status: DISCONTINUED | OUTPATIENT
Start: 2020-06-30 | End: 2020-07-02 | Stop reason: HOSPADM

## 2020-06-30 RX ADMIN — SERTRALINE HYDROCHLORIDE 50 MG: 50 TABLET ORAL at 08:21

## 2020-06-30 RX ADMIN — INSULIN LISPRO 13 UNITS: 100 INJECTION, SOLUTION INTRAVENOUS; SUBCUTANEOUS at 12:35

## 2020-06-30 RX ADMIN — PREGABALIN 75 MG: 75 CAPSULE ORAL at 20:52

## 2020-06-30 RX ADMIN — PANTOPRAZOLE SODIUM 20 MG: 20 TABLET, DELAYED RELEASE ORAL at 08:20

## 2020-06-30 RX ADMIN — ISOSORBIDE MONONITRATE 120 MG: 120 TABLET ORAL at 08:21

## 2020-06-30 RX ADMIN — VITAM B12 100 MCG: 100 TAB at 08:21

## 2020-06-30 RX ADMIN — RANOLAZINE 500 MG: 500 TABLET, FILM COATED, EXTENDED RELEASE ORAL at 08:20

## 2020-06-30 RX ADMIN — FUROSEMIDE 5 MG/HR: 10 INJECTION, SOLUTION INTRAVENOUS at 08:20

## 2020-06-30 RX ADMIN — MELATONIN 3 MG: at 20:56

## 2020-06-30 RX ADMIN — ARIPIPRAZOLE 5 MG: 5 TABLET ORAL at 08:21

## 2020-06-30 RX ADMIN — Medication 10 ML: at 08:20

## 2020-06-30 RX ADMIN — INSULIN LISPRO 1 UNITS: 100 INJECTION, SOLUTION INTRAVENOUS; SUBCUTANEOUS at 12:35

## 2020-06-30 RX ADMIN — VITAMIN D, TAB 1000IU (100/BT) 1000 UNITS: 25 TAB at 08:21

## 2020-06-30 RX ADMIN — ENOXAPARIN SODIUM 30 MG: 30 INJECTION SUBCUTANEOUS at 08:19

## 2020-06-30 RX ADMIN — Medication 400 MG: at 08:22

## 2020-06-30 RX ADMIN — TICAGRELOR 90 MG: 90 TABLET ORAL at 20:52

## 2020-06-30 RX ADMIN — ASPIRIN 81 MG 81 MG: 81 TABLET ORAL at 08:21

## 2020-06-30 RX ADMIN — TICAGRELOR 90 MG: 90 TABLET ORAL at 08:21

## 2020-06-30 RX ADMIN — IPRATROPIUM BROMIDE AND ALBUTEROL SULFATE 1 AMPULE: .5; 3 SOLUTION RESPIRATORY (INHALATION) at 15:33

## 2020-06-30 RX ADMIN — INSULIN GLARGINE 30 UNITS: 100 INJECTION, SOLUTION SUBCUTANEOUS at 21:27

## 2020-06-30 RX ADMIN — INSULIN LISPRO 13 UNITS: 100 INJECTION, SOLUTION INTRAVENOUS; SUBCUTANEOUS at 08:52

## 2020-06-30 RX ADMIN — RANOLAZINE 500 MG: 500 TABLET, FILM COATED, EXTENDED RELEASE ORAL at 20:52

## 2020-06-30 RX ADMIN — ATORVASTATIN CALCIUM 40 MG: 40 TABLET, FILM COATED ORAL at 08:22

## 2020-06-30 RX ADMIN — PREGABALIN 75 MG: 75 CAPSULE ORAL at 08:21

## 2020-06-30 ASSESSMENT — ENCOUNTER SYMPTOMS
GASTROINTESTINAL NEGATIVE: 1
VOMITING: 0
NAUSEA: 0
BLOOD IN STOOL: 0
SHORTNESS OF BREATH: 0
CHEST TIGHTNESS: 0
SHORTNESS OF BREATH: 1
EYES NEGATIVE: 1
STRIDOR: 0
COUGH: 0
DIARRHEA: 0
WHEEZING: 0

## 2020-06-30 ASSESSMENT — PAIN SCALES - WONG BAKER: WONGBAKER_NUMERICALRESPONSE: 0

## 2020-06-30 ASSESSMENT — PAIN SCALES - GENERAL: PAINLEVEL_OUTOF10: 0

## 2020-06-30 NOTE — PROGRESS NOTES
2007        Past Medical History:   Diagnosis Date    Anxiety     CAD S/P percutaneous coronary angioplasty , 2018    stents per dr Sidney Walter CKD stage 4 due to type 2 diabetes mellitus ([address]     Diabetic nephropathy with proteinuria ([address]     DJD (degenerative joint disease) of knee     Dr Karen De Santiago GERD (gastroesophageal reflux disease)     Hemiparesis, left ([address]     entered Ass[facility] Living ([facility])    History of heart failure     History of seizures     History of type C viral hepatitis     HTN (hypertension)     Hyperlipidemia     Impaired mobility and activities of daily living     Mediastinal lymphadenopathy     [facility]    Metabolic syndrome     Neurogenic urinary incontinence 2013    Neuropathy in diabetes ([address]     Obesity (BMI 30-39. 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 Salvador Bond      x1 Dr. Diaz, Dr Angelito Amezquita CATH LAB PROCEDURE  10/02/2019    HYSTERECTOMY, TOTAL ABDOMINAL      one ovary intact, Dr Mcmahan Pod, menorrhagia    NH TOTAL KNEE ARTHROPLASTY Left 2018    LEFT KNEE TOTAL KNEE ARTHROPLASTY, SHAYNA, NERVE BLOCK performed by Dav Avalos MD at [address] [place] [address] Right     TOTAL KNEE ARTHROPLASTY  16    Dr Tobias Olivarez All fall risk precautions in place, Bed alarm in place     AMPA (6 CLICK) BASIC MOBILITY  AM-PAC Inpatient Mobility Raw Score : 22     Therapy Time   Individual   Time In 0904   Time Out 0915   Minutes 11      Minutes: 11  Transfers/Bedmobility: 2  Gait: 1111 EMinh Pablo Rehman PTA, 06/30/20 at 9:31 AM         Definitions for assistance levels  Independent = pt does not require any physical supervision or assistance from another person for activity completion. Device may be needed.   Stand by assistance = pt requires verbal cues or instructions from another person, close to but not touching, to perform the activity  Minimal assistance= pt performs 75% or more of the activity; assistance is required to complete the activity  Moderate assistance= pt performs 50% of the activity; assistance is required to complete the activity  Maximal assistance = pt performs 25% of the activity; assistance is required to complete the activity  Dependent = pt requires total physical assistance to accomplish the task

## 2020-06-30 NOTE — FLOWSHEET NOTE
0730: Handoff complete. This RN assumed care of the pt. Pt has been repositioned in bed and has no further needs at this time. 0820: AM meds and assessment completed. Pt is A&OX4. PERRLA. Lungs are diminished bilaterally. Pt has C/O SOB. Dr. Stacey Chong is aware. Bowel sounds are active X4. Generalized non-pitting edema noted. Pulses are palpable. Pt has several areas of scabbing. She has a scratch/scab to the right forearm that is bleeding. Pt states that she has been picking at it. Dressing changed. Pt set up for breakfast.   1239: Pt accidentally pulled out her IV. New #22 placed in the left hand  1657: radiology called and stated that the pt has a 0900 appointment on 7/1/2020 for the insertion of the dialysis cath. She stated to hold the lovenox in the AM. Consent signed and in the chart.

## 2020-06-30 NOTE — PROGRESS NOTES
Oral Daily    pantoprazole  20 mg Oral Daily    pregabalin  75 mg Oral BID    ranolazine  500 mg Oral BID    sertraline  50 mg Oral Daily    ticagrelor  90 mg Oral BID    vitamin B-12  100 mcg Oral Daily    triamcinolone   Topical Daily    Vitamin D  1 tablet Oral Daily    insulin lispro  0-6 Units Subcutaneous TID WC    insulin lispro  0-3 Units Subcutaneous Nightly    sodium chloride flush  10 mL Intravenous 2 times per day    enoxaparin  30 mg Subcutaneous Daily    sodium polystyrene  15 g Oral Once     Continuous Infusions:   dextrose      furosemide (LASIX) 1mg/ml infusion 5 mg/hr (06/30/20 0820)       CBC:   Recent Labs     06/28/20  1400   WBC 7.3   HGB 8.9*        CMP:    Recent Labs     06/28/20  1400 06/29/20  1115 06/30/20  0519   * 134* 137   K 5.0* 3.9 3.9   CL 92* 99 99   CO2 22 21 23   BUN 72* 67* 63*   CREATININE 4.13* 3.97* 4.01*   GLUCOSE 71 143* 110*   CALCIUM 9.8 9.8 9.3   LABGLOM 10.9* 11.4* 11.3*     Troponin:   Recent Labs     06/28/20  2355   TROPONINI <0.010     BNP: No results for input(s): BNP in the last 72 hours. INR: No results for input(s): INR in the last 72 hours. Lipids: No results for input(s): CHOL, LDLDIRECT, TRIG, HDL, AMYLASE, LIPASE in the last 72 hours. Liver:   Recent Labs     06/28/20  1400   AST 45*   ALT 34*   ALKPHOS 105   PROT 6.9   LABALBU 4.0   BILITOT 0.5     Iron:  No results for input(s): IRONS, FERRITIN in the last 72 hours. Invalid input(s): LABIRONS  Urinalysis: No results for input(s): UA in the last 72 hours.     Objective:  Vitals: BP (!) 158/67   Pulse 77   Temp 98.1 °F (36.7 °C) (Oral)   Resp 16   Ht 5' 7\" (1.702 m)   Wt 262 lb (118.8 kg)   LMP  (LMP Unknown)   SpO2 99%   BMI 41.04 kg/m²    Wt Readings from Last 3 Encounters:   06/29/20 262 lb (118.8 kg)   06/25/20 256 lb (116.1 kg)   06/28/20 237 lb (107.5 kg)      24HR INTAKE/OUTPUT:      Intake/Output Summary (Last 24 hours) at 6/30/2020 1004  Last data filed at 6/30/2020 0827  Gross per 24 hour   Intake 408.92 ml   Output 4950 ml   Net -4541.08 ml       General: alert, in no apparent distress  HEENT: normocephalic, atraumatic, anicteric  Neck: supple, no mass  Lungs: non-labored respirations, clear to auscultation bilaterally  Heart: regular rate and rhythm, no murmurs or rubs  Abdomen: soft, non-tender, non-distended  Ext: no cyanosis, no peripheral edema  Neuro: alert and oriented, no gross abnormalities  Psych: normal mood and affect  Skin: no rash      Electronically signed by Lamar Cochran MD

## 2020-06-30 NOTE — DISCHARGE INSTR - COC
Continuity of Care Form    Patient Name: Mj Avila   :  1960  MRN:  [id number]    Admit date:  2020  Discharge date:  2020    Code Status Order: Full Code   Advance Directives:   [facility] Documentation     Date/Time Healthcare Directive Type of Healthcare Directive Copy in [address] Agent's Name Healthcare Agent's Phone Number    20  Yes, patient has an advance directive for healthcare treatment  Durable power of  for health care  Yes, copy in chart  Adult [facility]  [phone]          Admitting Physician:  Cheryle Median, MD  PCP: Dustin Arias MD    Discharging Nurse: [facility] Unit/Room#: X895/E738-93  Discharging Unit Phone Number: ***    Emergency Contact:   Extended Emergency Contact Information  Primary Emergency Contact: [address] Phone: [phone]  Work Phone: [phone]  Mobile Phone: [phone]  Relation: Child    Past Surgical History:  Past Surgical History:   Procedure Laterality Date     SECTION      x1    COLONOSCOPY  2014    Dr. Ordaz Burn      x1 Dr. Michelle Galeana, Dr Mary Pendleton CATH LAB PROCEDURE  10/02/2019    HYSTERECTOMY, TOTAL ABDOMINAL      one ovary intact, Dr Arlin Kemp, menorrhagia   [place] [facility] Left 2018    LEFT KNEE TOTAL KNEE ARTHROPLASTY, SHAYNA, NERVE BLOCK performed by Gagan Vann MD at [address] [place] [address] Right     TOTAL KNEE ARTHROPLASTY  16    Dr Eric Knox       Immunization History:   Immunization History   Administered Date(s) Administered    Influenza Vaccine, unspecified formulation 10/14/2016    Influenza Virus Vaccine 10/20/2014, 10/30/2015, 10/14/2016, 2017, 10/12/2018    Influenza Whole 10/20/2014    Influenza, Quadv, IM, (6 mo and older Fluzone, Flulaval, Fluarix and 3 yrs and older Afluria) 09/29/2017, 10/12/2018    Influenza, Celestia Loron, IM, PF (6 mo and older Fluzone, Flulaval, Fluarix, and 3 yrs and older Afluria) 10/03/2019    Influenza, Triv, 3 Years and older, IM (Afluria (5 yrs and older) 10/14/2016    Pneumococcal Polysaccharide ([id number]) 10/15/2016       Active Problems:  Patient Active Problem List   Diagnosis Code    Coronary artery disease involving native heart with angina pectoris (HCC) I25.119    Schizophrenia, paranoid, chronic F37.9    Metabolic syndrome E55.81    Vitamin B 12 deficiency E53.8    Cerebral microvascular disease I67.9    Mixed hyperlipidemia E78.2    Other hammer toe (acquired) M20.40    Vitamin D insufficiency E55.9    Incontinence of urine R32    Diabetic nephropathy with proteinuria ([facility] E11.21    HTN (hypertension) I10    History of type C viral hepatitis Z86.19    Urinary incontinence due to cognitive impairment R39.81    History of seizures Z87.898    Stented coronary artery-plan is to stay on Plavix indefinately per Dr Delmi Rosado Z95.5    Hemiparesis, left ([address] G81.94    Angina, class II ([address] I20.9    CKD (chronic kidney disease) stage 4, GFR 15-29 ml/min ([facility]) N18.4    Chronic painful diabetic neuropathy ([facility]) E11.40    Tardive dyskinesia G24.01    Shortness of breath R06.02    Uncontrolled type 2 diabetes mellitus with hyperglycemia ([facility]) K45.82    Diastolic congestive heart failure ([facility]) I50.30    Sleep apnea G47.30    Pulmonary hypertension ([facility]) I27.20    Obesity, Class III, BMI 40-49.9 (morbid obesity) ([facility]) E66.01    Edema R60.9    Closed supracondylar fracture of right humerus S42.411A    Other chronic pain G89.29    Palliative care patient Z51.5    Chest pain R07.9    Recurrent falls R29.6    Renal failure N19       Isolation/Infection:   Isolation          No Isolation        Patient Infection Status     Infection Onset Added Last Indicated Last Indicated By Review Planned Expiration Resolved Resolved By    None active    Resolved    COVID-19 Rule Out 06/29/20 06/29/20 06/29/20 COVID-19 (Ordered)   06/29/20 Rule-Out Test Resulted    COVID-19 Rule Out 06/28/20 06/28/20 06/28/20 COVID-19 (Ordered)   06/28/20 Rule-Out Test Resulted    COVID-19 Rule Out 06/14/20 06/14/20 06/14/20 COVID-19 (Ordered)   06/14/20 Rule-Out Test Resulted          Nurse Assessment:  Last Vital Signs: BP (!) 137/54   Pulse 76   Temp 98.1 °F (36.7 °C) (Oral)   Resp 16   Ht 5' 7\" (1.702 m)   Wt 262 lb (118.8 kg)   LMP  (LMP Unknown)   SpO2 100%   BMI 41.04 kg/m²     Last documented pain score (0-10 scale): Pain Level: 0  Last Weight:   Wt Readings from Last 1 Encounters:   06/29/20 262 lb (118.8 kg)     Mental Status:  oriented    IV Access:  - None    Nursing Mobility/ADLs:  Walking   Assisted  Transfer  Assisted  Bathing  Assisted  Dressing  Assisted  Toileting  Assisted  Feeding  Independent  Med Admin  Independent  Med Delivery   whole    Wound Care Documentation and Therapy:  Wound 06/28/20 Arm Distal;Left;Lower red, open  (Active)   Dressing Status Clean;Dry 6/30/2020  8:20 AM   Dressing Changed Changed/New 6/30/2020  8:20 AM   Dressing/Treatment Dry dressing 6/30/2020  8:20 AM   Number of days: 1        Elimination:  Continence:   · Bowel: Yes  · Bladder: Yes  Urinary Catheter: Insertion Date: 6/28/2020 and Removal Date 7/2/2020   Colostomy/Ileostomy/Ileal Conduit: No       Date of Last BM: 7/2/2020      Intake/Output Summary (Last 24 hours) at 6/30/2020 1508  Last data filed at 6/30/2020 1430  Gross per 24 hour   Intake 528.92 ml   Output 5200 ml   Net -4671.08 ml     I/O last 3 completed shifts: In: 528.9 [P.O.:360; I.V.:168.9]  Out: 5200 [Urine:5200]    Safety Concerns: At Risk for Falls    Impairments/Disabilities:      None    Nutrition Therapy:  Current Nutrition Therapy:   - Oral Diet:  Cardiac and Renal    Routes of Feeding: Oral  Liquids:  Thin Liquids  Daily Fluid Restriction: no  Last Modified Barium Swallow with Video (Video Swallowing Test): not done    Treatments at the Time of Hospital Discharge:   Respiratory Treatments: ***  Oxygen Therapy:  is not on home oxygen therapy. Ventilator:    - No ventilator support    Rehab Therapies: Physical Therapy and Occupational Therapy  Weight Bearing Status/Restrictions: No weight bearing restirctions  Other Medical Equipment (for information only, NOT a DME order):  walker  Other Treatments: ***    Patient's personal belongings (please select all that are sent with patient):  {[facility] DME Belongings:[id number]}    RN SIGNATURE:   Electronically signed by Eric Ames RN on 7/2/2020 at 3:37 PM      CASE MANAGEMENT/SOCIAL WORK SECTION    Inpatient Status Date: 06/28/2020    Readmission Risk Assessment Score:  Readmission Risk              Risk of Unplanned Readmission:        41           Discharging to Facility/ Agency   · Name: [facility]  · Address:  · Phone:  · Fax:    Dialysis Facility (if applicable)   · Name:[facility]  · Address:  · Dialysis Schedule:TUESDAY, Thursday, Saturday AT 11:40. FIRST DAY AT THE CENTER IS Tuesday. ARRIVE 30 MIN EARLY TO COMPLETE PAPERWORK.     · Phone:  · Fax:    / signature: Electronically signed by HIPOLITO Odonnell on 6/30/20 at 3:08 PM EDT    PHYSICIAN SECTION    Prognosis:fair    Condition at Discharge: fair    Rehab Potential (if transferring to Rehab): stable    Recommended Labs or Other Treatments After Discharge: CBC and BMP in 2 to 3 days    PHYSICIAN SIGNATURE:  Electronically signed by Jesus Hansen MD on 7/2/20 at 11:07 AM EDT    [facility] as out pt regarding starting [address] as outpt for afib

## 2020-06-30 NOTE — PLAN OF CARE
Problem: Falls - Risk of:  Goal: Will remain free from falls  Description: Will remain free from falls  Outcome: Ongoing  Goal: Absence of physical injury  Description: Absence of physical injury  Outcome: Ongoing     Problem: Urinary Retention:  Goal: Urinary elimination within specified parameters  Description: Urinary elimination within specified parameters  Outcome: Ongoing  Goal: Able to perform urinary catheter care  Description: Able to perform urinary catheter care  Outcome: Ongoing  Goal: Able to perform urinary self-catheterization  Description: Able to perform urinary self-catheterization  Outcome: Ongoing  Goal: Ability to reestablish a normal urinary elimination pattern will improve - after catheter removal  Description: Ability to reestablish a normal urinary elimination pattern will improve - after catheter removal  Outcome: Ongoing  Goal: Ability to recognize the need to void and respond appropriately will improve  Description: Ability to recognize the need to void and respond appropriately will improve  Outcome: Ongoing  Goal: Absence of postvoid residual urine  Description: Absence of postvoid residual urine  Outcome: Ongoing     Problem: Tissue Perfusion - Renal, Altered:  Goal: Electrolytes within specified parameters  Description: Electrolytes within specified parameters  Outcome: Ongoing  Goal: Urine creatinine clearance will be within specified parameters  Description: Urine creatinine clearance will be within specified parameters  Outcome: Ongoing  Goal: Serum creatinine will be within specified parameters  Description: Serum creatinine will be within specified parameters  Outcome: Ongoing  Goal: Ability to achieve a balanced intake and output will improve  Description: Ability to achieve a balanced intake and output will improve  Outcome: Ongoing     Problem: IP DRESSINGS LOWER EXTREMITIES  Goal: LTG - patient will dress lower body with or without assistive device  Outcome: Ongoing Problem: Mobility - Impaired:  Goal: Mobility will improve  Description: Therapy evaluation completed. Please see daily notes and/or progress notes for details related to planned treatment interventions, goals and functional abilities.      Outcome: Ongoing

## 2020-06-30 NOTE — FLOWSHEET NOTE
Dr. Jose schilling served by enrique Callahan to dc isolation and transfer. Waiting on bed to become ready. Pt informed. Evening assessment complete, VSS. No signs of distress. Reynolds having a lot of clear yellow output. Medicated with prn melatonin per request. Pt refused lantus.      Electronically signed by Rashaad Lopez RN on 6/29/2020 at 8:05 PM

## 2020-06-30 NOTE — PROGRESS NOTES
[facility] [place] Respiratory Therapy Evaluation   Current Order:  Duoneb Q4 PRN    Home Regimen: PRN per PT      Ordering Physician: Ted Myrick  Re-evaluation Date:       Diagnosis: Renal Failure      Patient Status: Stable    The following MDI Criteria must be met in order to convert aerosol to MDI with spacer. If unable to meet, MDI will be converted to aerosol:  []  Patient able to demonstrate the ability to use MDI effectively  []  Patient alert and cooperative  []  Patient able to take deep breath with 5-10 second hold  []  Medication(s) available in this delivery method   []  Peak flow greater than or equal to 200 ml/min            Current Order Substituted To  (same drug, same frequency)   Aerosol to MDI [] Albuterol Sulfate 0.083% unit dose by aerosol Albuterol Sulfate MDI 2 puffs by inhalation with spacer    [] Levalbuterol 1.25 mg unit dose by aerosol Levalbuterol MDI 2 puffs by inhalation with spacer    [] Levalbuterol 0.63 mg unit dose by aerosol Levalbuterol MDI 2 puffs by inhalation with spacer    [] Ipratropium Bromide 0.02% unit dose by aerosol Ipratropium Bromide MDI 2 puffs by inhalation with spacer    [] Duoneb (Ipratropium + Albuterol) unit dose by aerosol Ipratropium MDI + Albuterol MDI 2 puffs by inhalation w/spacer   MDI to Aerosol [] Albuterol Sulfate MDI Albuterol Sulfate 0.083% unit dose by aerosol    [] Levalbuterol MDI 2 puffs by inhalation Levalbuterol 1.25 mg unit dose by aerosol    [] Ipratropium Bromide MDI by inhalation Ipratropium Bromide 0.02% unit dose by aerosol    [] Combivent (Ipratropium + Albuterol) MDI by inhalation Duoneb (Ipratropium + Albuterol) unit dose by aerosol   Treatment Assessment [Frequency/Schedule]:  Change frequency to: ______No changes____________________________________________per Protocol, P&T, MEC      Points 0 1 2 3 4   Pulmonary Status  Non-Smoker  []   Smoking history   < 20 pack years  [x]   Smoking history  ?  20 pack years  []   Pulmonary Disorder  (acute or chronic)  []   Severe or Chronic w/ Exacerbation  []     Surgical Status No [x]   Surgeries     General []   Surgery Lower []   Abdominal Thoracic or []   Upper Abdominal Thoracic with  PulmonaryDisorder  []     Chest X-ray Clear/Not  Ordered     [x]  Chronic Changes  Results Pending  []  Infiltrates, atelectasis, pleural effusion, or edema  []  Infiltrates in more than one lobe []  Infiltrate + Atelectasis, &/or pleural effusion  []    Respiratory Pattern Regular,  RR = 12-20 [x]  Increased,  RR = 21-25 []  GONZALEZ, irregular,  or RR = 26-30 []  Decreased FEV1  or RR = 31-35 []  Severe SOB, use  of accessory muscles, or RR ? 35  []    Mental Status Alert, oriented,  Cooperative [x]  Confused but Follows commands []  Lethargic or unable to follow commands []  Obtunded  []  Comatose  []    Breath Sounds Clear to  auscultation  []  Decreased unilaterally or  in bases only []  Decreased  bilaterally  [x]  Crackles or intermittent wheezes []  Wheezes []    Cough Strong, Spontan., & nonproductive [x]  Strong,  spontaneous, &  productive []  Weak,  Nonproductive []  Weak, productive or  with wheezes []  No spontaneous  cough or may require suctioning []    Level of Activity Ambulatory []  Ambulatory w/ Assist  [x]  Non-ambulatory []  Paraplegic []  Quadriplegic []    Total    Score:____4___     Triage Score:_____5___      Tri       Triage:     1. (>20) Freq: Q3    2. (16-20) Freq: Q4   3. (11-15) Freq: QID & Albuterol Q2 PRN    4. (6-10) Freq: TID & Albuterol Q2 PRN    5. (0-5) Freq Q4prn

## 2020-06-30 NOTE — PROGRESS NOTES
[place] [facility] status post negative covid test x 2. Patient is on a lasix drip. BS on arrival 103. Patient in good spirits. Gerson Nares to go home. Patient also stated that she wanted to be left alone to sleep.

## 2020-06-30 NOTE — PLAN OF CARE
Problem: Falls - Risk of:  Goal: Will remain free from falls  Description: Will remain free from falls  6/30/2020 1114 by Nicole Escamilla RN  Outcome: Ongoing  6/30/2020 0736 by Nicole Escamilla RN  Outcome: Ongoing  Goal: Absence of physical injury  Description: Absence of physical injury  6/30/2020 1114 by Nicole Escamilla RN  Outcome: Ongoing  6/30/2020 0736 by Nicole Escamilla RN  Outcome: Ongoing     Problem: Urinary Retention:  Goal: Urinary elimination within specified parameters  Description: Urinary elimination within specified parameters  6/30/2020 1114 by Nicole Escamilla RN  Outcome: Ongoing  6/30/2020 0736 by Nicole Escamilla RN  Outcome: Ongoing  Goal: Able to perform urinary catheter care  Description: Able to perform urinary catheter care  6/30/2020 1114 by Nicole Escamilla RN  Outcome: Ongoing  6/30/2020 0736 by Nicole Escamilla RN  Outcome: Ongoing  Goal: Able to perform urinary self-catheterization  Description: Able to perform urinary self-catheterization  6/30/2020 1114 by Nicole Escamilla RN  Outcome: Ongoing  6/30/2020 0736 by Nicole Escamilla RN  Outcome: Ongoing  Goal: Ability to reestablish a normal urinary elimination pattern will improve - after catheter removal  Description: Ability to reestablish a normal urinary elimination pattern will improve - after catheter removal  6/30/2020 1114 by Nicole Escamilla RN  Outcome: Ongoing  6/30/2020 0736 by Nicole Escamilla RN  Outcome: Ongoing  Goal: Ability to recognize the need to void and respond appropriately will improve  Description: Ability to recognize the need to void and respond appropriately will improve  6/30/2020 1114 by Nicole Escamilla RN  Outcome: Ongoing  6/30/2020 0736 by Nicole Escamilla RN  Outcome: Ongoing  Goal: Absence of postvoid residual urine  Description: Absence of postvoid residual urine  6/30/2020 1114 by Nicole Escamilla RN  Outcome: Ongoing  6/30/2020 0736 by Nicole Escamilla RN  Outcome: Ongoing     Problem: Tissue Perfusion - Renal, integrity will be maintained  Description: Skin integrity will be maintained  Outcome: Ongoing     Problem:  Activity:  Goal: Fatigue will decrease  Description: Fatigue will decrease  Outcome: Ongoing  Goal: Risk for activity intolerance will decrease  Description: Risk for activity intolerance will decrease  Outcome: Ongoing     Problem: Coping:  Goal: Ability to cope will improve  Description: Ability to cope will improve  Outcome: Ongoing     Problem: Fluid Volume:  Goal: Will show no signs or symptoms of fluid imbalance  Description: Will show no signs or symptoms of fluid imbalance  Outcome: Ongoing     Problem: Health Behavior:  Goal: Ability to manage health-related needs will improve  Description: Ability to manage health-related needs will improve  Outcome: Ongoing  Goal: Identification of resources available to assist in meeting health care needs will improve  Description: Identification of resources available to assist in meeting health care needs will improve  Outcome: Ongoing     Problem: Nutritional:  Goal: Ability to identify appropriate dietary choices will improve  Description: Ability to identify appropriate dietary choices will improve  Outcome: Ongoing     Problem: Physical Regulation:  Goal: Ability to maintain clinical measurements within normal limits will improve  Description: Ability to maintain clinical measurements within normal limits will improve  Outcome: Ongoing  Goal: Complications related to the disease process, condition or treatment will be avoided or minimized  Description: Complications related to the disease process, condition or treatment will be avoided or minimized  Outcome: Ongoing     Problem: Sensory:  Goal: General experience of comfort will improve  Description: General experience of comfort will improve  Outcome: Ongoing

## 2020-06-30 NOTE — PROGRESS NOTES
Pt to be ran tomorrow after line placement. Dr. Tim Ruelas notified. FLAQUITO Box from [facility] notified of change.

## 2020-06-30 NOTE — PROGRESS NOTES
Progress Note  Patient: Nivia Braun  Unit/Bed: V126/U181-74  YOB: 1960  MRN: [id number]  Acct: [de-identified]   Admitting Diagnosis: Renal failure [N19]  Admit Date:  2020  Hospital Day: 2    Chief Complaint: SOB CKD CAD     Histories:  Past Medical History:   Diagnosis Date    Anxiety     CAD S/P percutaneous coronary angioplasty ,     stents per dr Cristina Lee CKD stage 4 due to type 2 diabetes mellitus ([address]     Diabetic nephropathy with proteinuria ([address]     DJD (degenerative joint disease) of knee     Dr Michelle Adams GERD (gastroesophageal reflux disease)     Hemiparesis, left ([facility] 2013    entered Ass[facility] Living ([facility])    History of heart failure     History of seizures     History of type C viral hepatitis     HTN (hypertension)     Hyperlipidemia     Impaired mobility and activities of daily living     Mediastinal lymphadenopathy 2013    Dr Erica Butler, [facility]    Metabolic syndrome     Neurogenic urinary incontinence 2013    Neuropathy in diabetes ([facility]     Obesity (BMI 30-39. 9)     Recurrent UTI     S/P colonoscopy 2014    [facility], focal active colitis    Schizophrenia, paranoid, chronic ([address]     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 Vita Perez      x1 Dr. Duane Scott, Dr Jennifer Dejesus CATH LAB PROCEDURE  10/02/2019    HYSTERECTOMY, TOTAL ABDOMINAL      one ovary intact, Dr Lilli Stokes, menorrhagia    DC TOTAL KNEE ARTHROPLASTY Left 6/21/2018    LEFT KNEE TOTAL KNEE ARTHROPLASTY, SHAYNA, NERVE BLOCK performed by Keith Mcgregor MD at [address] [place] [address] TOE AMPUTATION Right     TOTAL KNEE ARTHROPLASTY  05/19/16    Dr Ricardo Valencia     Family History   Problem Relation Age of Onset    Cancer Mother 76        survived   [facility] Hypertension Father     Diabetes Sister     Mental Illness Sister      Social History     Socioeconomic History    Marital status:      Spouse name: None    Number of children: 2    Years of education: None    Highest education level: None   Occupational History    Occupation: disabled   Social Needs    Financial resource strain: None    Food insecurity     Worry: None     Inability: None    Transportation needs     Medical: None     Non-medical: None   Tobacco Use    Smoking status: Passive Smoke Exposure - Never Smoker    Smokeless tobacco: Never Used   Substance and Sexual Activity    Alcohol use: No     Alcohol/week: 0.0 standard drinks    Drug use: No    Sexual activity: Not Currently   Lifestyle    Physical activity     Days per week: None     Minutes per session: None    Stress: None   Relationships    Social connections     Talks on phone: None     Gets together: None     Attends Jewish service: None     Active member of club or organization: None     Attends meetings of clubs or organizations: None     Relationship status: None    Intimate partner violence     Fear of current or ex partner: None     Emotionally abused: None     Physically abused: None     Forced sexual activity: None   Other Topics Concern    None   Social History Narrative    Born in [place], one of 5    Twin sister Reyes, very ill in 2018, [place] [address]    Moved to [facility], , 2 children, one son and one daughter    Worked at [employer], as a nurse's aide    Disabled due to mental illness    Lived at [facility], was discharged, returned to independent living in 2017 in the daughter's house and has adjusted well    One son and one daughter, live in the same house with patient, Raymond Grossman pays the rent    Hobbies reading (misteries)       Subjective/HPI still weak and SOB. Walked in room little. Eating ok. Diuresed 6.3 L thus far. EKG:SR 70s        Review of Systems:   Review of Systems   Constitutional: Negative. Negative for diaphoresis and fatigue. HENT: Negative. Eyes: Negative. Respiratory: Positive for shortness of breath. Negative for cough, chest tightness, wheezing and stridor. Cardiovascular: Negative. Negative for chest pain, palpitations and leg swelling. Gastrointestinal: Negative. Negative for blood in stool and nausea. Genitourinary: Negative. Musculoskeletal: Negative. Skin: Negative. Neurological: Negative. Negative for dizziness, syncope, weakness and light-headedness. Hematological: Negative. Psychiatric/Behavioral: Negative. Physical Examination:    BP (!) 158/67   Pulse 77   Temp 98.1 °F (36.7 °C) (Oral)   Resp 16   Ht 5' 7\" (1.702 m)   Wt 262 lb (118.8 kg)   LMP  (LMP Unknown)   SpO2 99%   BMI 41.04 kg/m²    Physical Exam   Constitutional: She appears healthy. No distress. HENT:   Normal cephalic and Atraumatic   Eyes: Pupils are equal, round, and reactive to light. Neck: Normal range of motion and thyroid normal. Neck supple. No JVD present. No neck adenopathy. No thyromegaly present. Cardiovascular: Normal rate, regular rhythm, intact distal pulses and normal pulses. Murmur heard. Pulmonary/Chest: Effort normal and breath sounds normal. She has no wheezes. She has no rales. She exhibits no tenderness. Abdominal: Soft. Bowel sounds are normal. There is no abdominal tenderness. Musculoskeletal: Normal range of motion. General: No tenderness or edema. Neurological: She is alert and oriented to person, place, and time. Skin: Skin is warm. No cyanosis.  Nails show no clubbing. LABS:  CBC:   Lab Results   Component Value Date    WBC 7.3 06/28/2020    RBC 3.23 06/28/2020    HGB 8.9 06/28/2020    HCT 26.2 06/28/2020    MCV 81.1 06/28/2020    MCH 27.4 06/28/2020    MCHC 33.8 06/28/2020    RDW 14.6 06/28/2020     06/28/2020    MPV 10.0 03/05/2020     CBC with Differential:    Lab Results   Component Value Date    WBC 7.3 06/28/2020    RBC 3.23 06/28/2020    HGB 8.9 06/28/2020    HCT 26.2 06/28/2020     06/28/2020    MCV 81.1 06/28/2020    MCH 27.4 06/28/2020    MCHC 33.8 06/28/2020    RDW 14.6 06/28/2020    BANDSPCT 5 06/14/2013    LYMPHOPCT 14.0 06/28/2020    MONOPCT 7.6 06/28/2020    EOSPCT 3.5 03/05/2020    BASOPCT 0.8 06/28/2020    MONOSABS 0.6 06/28/2020    LYMPHSABS 1.0 06/28/2020    EOSABS 0.2 06/28/2020    BASOSABS 0.1 06/28/2020     CMP:    Lab Results   Component Value Date     06/30/2020    K 3.9 06/30/2020    K 4.0 06/15/2020    CL 99 06/30/2020    CO2 23 06/30/2020    BUN 63 06/30/2020    CREATININE 4.01 06/30/2020    GFRAA 13.7 06/30/2020    LABGLOM 11.3 06/30/2020    GLUCOSE 110 06/30/2020    PROT 6.9 06/28/2020    LABALBU 4.0 06/28/2020    CALCIUM 9.3 06/30/2020    BILITOT 0.5 06/28/2020    ALKPHOS 105 06/28/2020    AST 45 06/28/2020    ALT 34 06/28/2020     BMP:    Lab Results   Component Value Date     06/30/2020    K 3.9 06/30/2020    K 4.0 06/15/2020    CL 99 06/30/2020    CO2 23 06/30/2020    BUN 63 06/30/2020    LABALBU 4.0 06/28/2020    CREATININE 4.01 06/30/2020    CALCIUM 9.3 06/30/2020    GFRAA 13.7 06/30/2020    LABGLOM 11.3 06/30/2020    GLUCOSE 110 06/30/2020     Magnesium:    Lab Results   Component Value Date    MG 2.0 06/30/2020     Troponin:    Lab Results   Component Value Date    TROPONINI <0.010 06/28/2020        Active Hospital Problems    Diagnosis Date Noted    Renal failure [N19] 06/28/2020     Priority: Low        Assessment/Plan:  1. Advanced CKD - diuresing well. Continue same.   she will receive HD catheter this admission. Follow labs  2. CAD- no cp. Recent LAD DEWEY- DAPT. 3. LVEF 60%  4.  HTN Stable       Electronically signed by Huan Moffett MD on 6/30/2020 at 8:04 AM

## 2020-07-01 ENCOUNTER — APPOINTMENT (OUTPATIENT)
Dept: INTERVENTIONAL RADIOLOGY/VASCULAR | Age: 60
DRG: 291 | End: 2020-07-01
Payer: MEDICARE

## 2020-07-01 LAB
ANION GAP SERPL CALCULATED.3IONS-SCNC: 13 MEQ/L (ref 9–15)
BUN BLDV-MCNC: 54 MG/DL (ref 8–23)
CALCIUM SERPL-MCNC: 10.2 MG/DL (ref 8.5–9.9)
CHLORIDE BLD-SCNC: 98 MEQ/L (ref 95–107)
CO2: 27 MEQ/L (ref 20–31)
CREAT SERPL-MCNC: 3.75 MG/DL (ref 0.5–0.9)
GFR AFRICAN AMERICAN: 14.8
GFR NON-AFRICAN AMERICAN: 12.2
GLUCOSE BLD-MCNC: 108 MG/DL (ref 70–99)
GLUCOSE BLD-MCNC: 170 MG/DL (ref 60–115)
GLUCOSE BLD-MCNC: 233 MG/DL (ref 60–115)
GLUCOSE BLD-MCNC: 243 MG/DL (ref 60–115)
MAGNESIUM: 2 MG/DL (ref 1.7–2.4)
PERFORMED ON: ABNORMAL
POTASSIUM SERPL-SCNC: 4.2 MEQ/L (ref 3.4–4.9)
PRO-BNP: 6138 PG/ML
SODIUM BLD-SCNC: 138 MEQ/L (ref 135–144)

## 2020-07-01 PROCEDURE — 2500000003 HC RX 250 WO HCPCS: Performed by: RADIOLOGY

## 2020-07-01 PROCEDURE — 2580000003 HC RX 258: Performed by: RADIOLOGY

## 2020-07-01 PROCEDURE — 2580000003 HC RX 258

## 2020-07-01 PROCEDURE — 1210000000 HC MED SURG R&B

## 2020-07-01 PROCEDURE — 36558 INSERT TUNNELED CV CATH: CPT | Performed by: RADIOLOGY

## 2020-07-01 PROCEDURE — 76937 US GUIDE VASCULAR ACCESS: CPT | Performed by: RADIOLOGY

## 2020-07-01 PROCEDURE — 36415 COLL VENOUS BLD VENIPUNCTURE: CPT

## 2020-07-01 PROCEDURE — 6370000000 HC RX 637 (ALT 250 FOR IP): Performed by: RADIOLOGY

## 2020-07-01 PROCEDURE — 6360000002 HC RX W HCPCS: Performed by: INTERNAL MEDICINE

## 2020-07-01 PROCEDURE — 77001 FLUOROGUIDE FOR VEIN DEVICE: CPT | Performed by: RADIOLOGY

## 2020-07-01 PROCEDURE — 2580000003 HC RX 258: Performed by: INTERNAL MEDICINE

## 2020-07-01 PROCEDURE — 6370000000 HC RX 637 (ALT 250 FOR IP): Performed by: INTERNAL MEDICINE

## 2020-07-01 PROCEDURE — 02HV33Z INSERTION OF INFUSION DEVICE INTO SUPERIOR VENA CAVA, PERCUTANEOUS APPROACH: ICD-10-PCS | Performed by: RADIOLOGY

## 2020-07-01 PROCEDURE — 83735 ASSAY OF MAGNESIUM: CPT

## 2020-07-01 PROCEDURE — C1881 DIALYSIS ACCESS SYSTEM: HCPCS

## 2020-07-01 PROCEDURE — 80048 BASIC METABOLIC PNL TOTAL CA: CPT

## 2020-07-01 PROCEDURE — 99222 1ST HOSP IP/OBS MODERATE 55: CPT | Performed by: RADIOLOGY

## 2020-07-01 PROCEDURE — 83880 ASSAY OF NATRIURETIC PEPTIDE: CPT

## 2020-07-01 PROCEDURE — 90935 HEMODIALYSIS ONE EVALUATION: CPT

## 2020-07-01 PROCEDURE — 99232 SBSQ HOSP IP/OBS MODERATE 35: CPT | Performed by: INTERNAL MEDICINE

## 2020-07-01 PROCEDURE — 6360000002 HC RX W HCPCS: Performed by: RADIOLOGY

## 2020-07-01 RX ORDER — SODIUM CHLORIDE 9 MG/ML
INJECTION, SOLUTION INTRAVENOUS
Status: DISPENSED
Start: 2020-07-01 | End: 2020-07-01

## 2020-07-01 RX ORDER — LIDOCAINE HYDROCHLORIDE 20 MG/ML
INJECTION, SOLUTION INFILTRATION; PERINEURAL
Status: COMPLETED | OUTPATIENT
Start: 2020-07-01 | End: 2020-07-01

## 2020-07-01 RX ORDER — 0.9 % SODIUM CHLORIDE 0.9 %
INTRAVENOUS SOLUTION INTRAVENOUS CONTINUOUS PRN
Status: COMPLETED | OUTPATIENT
Start: 2020-07-01 | End: 2020-07-01

## 2020-07-01 RX ORDER — HEPARIN SODIUM,PORCINE/PF 1 UNIT/ML
2100 SYRINGE (ML) INTRAVENOUS PRN
Status: DISCONTINUED | OUTPATIENT
Start: 2020-07-01 | End: 2020-07-02

## 2020-07-01 RX ORDER — HEPARIN SODIUM 1000 [USP'U]/ML
INJECTION, SOLUTION INTRAVENOUS; SUBCUTANEOUS
Status: COMPLETED | OUTPATIENT
Start: 2020-07-01 | End: 2020-07-01

## 2020-07-01 RX ADMIN — ATORVASTATIN CALCIUM 40 MG: 40 TABLET, FILM COATED ORAL at 18:33

## 2020-07-01 RX ADMIN — SERTRALINE HYDROCHLORIDE 50 MG: 50 TABLET ORAL at 18:32

## 2020-07-01 RX ADMIN — VITAMIN D, TAB 1000IU (100/BT) 1000 UNITS: 25 TAB at 18:33

## 2020-07-01 RX ADMIN — ARIPIPRAZOLE 5 MG: 5 TABLET ORAL at 18:33

## 2020-07-01 RX ADMIN — HEPARIN SODIUM 4200 UNITS: 1000 INJECTION INTRAVENOUS; SUBCUTANEOUS at 09:52

## 2020-07-01 RX ADMIN — SODIUM CHLORIDE 500 ML: 9 INJECTION, SOLUTION INTRAVENOUS at 09:47

## 2020-07-01 RX ADMIN — HEPARIN SODIUM 4000 UNITS: 1000 INJECTION INTRAVENOUS; SUBCUTANEOUS at 16:02

## 2020-07-01 RX ADMIN — PANTOPRAZOLE SODIUM 20 MG: 20 TABLET, DELAYED RELEASE ORAL at 18:33

## 2020-07-01 RX ADMIN — Medication 2 EACH: at 09:57

## 2020-07-01 RX ADMIN — ISOSORBIDE MONONITRATE 120 MG: 120 TABLET ORAL at 18:33

## 2020-07-01 RX ADMIN — LIDOCAINE HYDROCHLORIDE 7 ML: 20 INJECTION, SOLUTION INFILTRATION; PERINEURAL at 09:39

## 2020-07-01 RX ADMIN — INSULIN GLARGINE 30 UNITS: 100 INJECTION, SOLUTION SUBCUTANEOUS at 23:37

## 2020-07-01 RX ADMIN — FUROSEMIDE 5 MG/HR: 10 INJECTION, SOLUTION INTRAVENOUS at 07:27

## 2020-07-01 RX ADMIN — PREGABALIN 75 MG: 75 CAPSULE ORAL at 21:16

## 2020-07-01 RX ADMIN — VITAM B12 100 MCG: 100 TAB at 18:34

## 2020-07-01 RX ADMIN — Medication 400 MG: at 18:34

## 2020-07-01 RX ADMIN — RANOLAZINE 500 MG: 500 TABLET, FILM COATED, EXTENDED RELEASE ORAL at 21:16

## 2020-07-01 ASSESSMENT — ENCOUNTER SYMPTOMS
CHEST TIGHTNESS: 0
NAUSEA: 0
EYES NEGATIVE: 1
VOMITING: 0
DIARRHEA: 0
BLOOD IN STOOL: 0
PHOTOPHOBIA: 0
STRIDOR: 0
GASTROINTESTINAL NEGATIVE: 1
COUGH: 0
SHORTNESS OF BREATH: 0
WHEEZING: 0
ABDOMINAL PAIN: 0
BACK PAIN: 0
CONSTIPATION: 0
SHORTNESS OF BREATH: 1

## 2020-07-01 ASSESSMENT — PAIN SCALES - GENERAL
PAINLEVEL_OUTOF10: 0

## 2020-07-01 NOTE — PROGRESS NOTES
Progress Note  Patient: Jerri Arana  Unit/Bed: I036/C839-00  YOB: 1960  MRN: [id number]  Acct: [de-identified]   Admitting Diagnosis: Renal failure [N19]  Admit Date:  2020  Hospital Day: 3    Chief Complaint: SOB CKD CAD     Histories:  Past Medical History:   Diagnosis Date    Anxiety     CAD S/P percutaneous coronary angioplasty ,     stents per dr Chuck Chong CKD stage 4 due to type 2 diabetes mellitus ([facility]     Diabetic nephropathy with proteinuria ([address]     DJD (degenerative joint disease) of knee     Dr Mauricio Glover GERD (gastroesophageal reflux disease)     Hemiparesis, left ([facility] 2013    entered Assisted Living ([facility])    History of heart failure     History of seizures     History of type C viral hepatitis     HTN (hypertension)     Hyperlipidemia     Impaired mobility and activities of daily living     Mediastinal lymphadenopathy     Dr Dutch Lovell, [facility]    Metabolic syndrome     Neurogenic urinary incontinence 2013    Neuropathy in diabetes ([facility]     Obesity (BMI 30-39. 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 Silvestre Alvares      x1 Dr. David Gill, Dr Roger Cota CATH LAB PROCEDURE  10/02/2019    HYSTERECTOMY, TOTAL ABDOMINAL      one ovary intact, Dr Christa Grande, menorrhagia    ME TOTAL KNEE ARTHROPLASTY Left 6/21/2018    LEFT KNEE TOTAL KNEE ARTHROPLASTY, SHAYNA, NERVE BLOCK performed by Robert Graham MD at [address] [place] [address] Right     TOTAL KNEE ARTHROPLASTY  05/19/16    Dr Abner Slade [address]UN[address]ELED [address] Right 07/01/2020    tunneled HD catheter per Dr Leal Overall     Family History   Problem Relation Age of Onset    Cancer Mother 76        survived   [place] Hypertension Father     Diabetes Sister     Mental Illness Sister      Social History     Socioeconomic History    Marital status:      Spouse name: None    Number of children: 2    Years of education: None    Highest education level: None   Occupational History    Occupation: disabled   Social Needs    Financial resource strain: None    Food insecurity     Worry: None     Inability: None    Transportation needs     Medical: None     Non-medical: None   Tobacco Use    Smoking status: Passive Smoke Exposure - Never Smoker    Smokeless tobacco: Never Used   Substance and Sexual Activity    Alcohol use: No     Alcohol/week: 0.0 standard drinks    Drug use: No    Sexual activity: Not Currently   Lifestyle    Physical activity     Days per week: None     Minutes per session: None    Stress: None   Relationships    Social connections     Talks on phone: None     Gets together: None     Attends Moravian service: None     Active member of club or organization: None     Attends meetings of clubs or organizations: None     Relationship status: None    Intimate partner violence     Fear of current or ex partner: None     Emotionally abused: None     Physically abused: None     Forced sexual activity: None   Other Topics Concern    None   Social History Narrative    Born in [place], one of 5    Twin sister Reyes, very ill in 2018, [place] [address]    Moved to [facility], , 2 children, one son and one daughter    Worked at [employer], as a nurse's aide    Disabled due to mental illness    Lived at [facility], was discharged, returned to independent living in 2017 in the daughter's house and has adjusted well    One son and one daughter, live in the same house with patient, Izabella Bauer pays the rent    Hobbies reading (misteries)       Subjective/HPI still weak and SOB. Walked in room little. Eating ok. Received HD catheter this AM     EKG:SR 70s        Review of Systems:   Review of Systems   Constitutional: Negative. Negative for diaphoresis and fatigue. HENT: Negative. Eyes: Negative. Respiratory: Positive for shortness of breath. Negative for cough, chest tightness, wheezing and stridor. Cardiovascular: Negative. Negative for chest pain, palpitations and leg swelling. Gastrointestinal: Negative. Negative for blood in stool and nausea. Genitourinary: Negative. Musculoskeletal: Negative. Skin: Negative. Neurological: Negative. Negative for dizziness, syncope, weakness and light-headedness. Hematological: Negative. Psychiatric/Behavioral: Negative. Physical Examination:    BP (!) 176/78   Pulse 75   Temp 98.4 °F (36.9 °C) (Oral)   Resp 12   Ht 5' 7\" (1.702 m)   Wt 262 lb (118.8 kg)   LMP  (LMP Unknown)   SpO2 97%   BMI 41.04 kg/m²    Physical Exam   Constitutional: She appears healthy. No distress. HENT:   Normal cephalic and Atraumatic   Eyes: Pupils are equal, round, and reactive to light. Neck: Normal range of motion and thyroid normal. Neck supple. No JVD present. No neck adenopathy. No thyromegaly present. Cardiovascular: Normal rate, regular rhythm, intact distal pulses and normal pulses. Murmur heard. Pulmonary/Chest: Effort normal and breath sounds normal. She has no wheezes. She has no rales. She exhibits no tenderness. Abdominal: Soft. Bowel sounds are normal. There is no abdominal tenderness. Musculoskeletal: Normal range of motion. General: No tenderness or edema.    Neurological: She is alert and oriented to person, place, and time. Skin: Skin is warm. No cyanosis. Nails show no clubbing.        LABS:  CBC:   Lab Results   Component Value Date    WBC 7.3 06/28/2020    RBC 3.23 06/28/2020    HGB 8.9 06/28/2020    HCT 26.2 06/28/2020    MCV 81.1 06/28/2020    MCH 27.4 06/28/2020    MCHC 33.8 06/28/2020    RDW 14.6 06/28/2020     06/28/2020    MPV 10.0 03/05/2020     CBC with Differential:    Lab Results   Component Value Date    WBC 7.3 06/28/2020    RBC 3.23 06/28/2020    HGB 8.9 06/28/2020    HCT 26.2 06/28/2020     06/28/2020    MCV 81.1 06/28/2020    MCH 27.4 06/28/2020    MCHC 33.8 06/28/2020    RDW 14.6 06/28/2020    BANDSPCT 5 06/14/2013    LYMPHOPCT 14.0 06/28/2020    MONOPCT 7.6 06/28/2020    EOSPCT 3.5 03/05/2020    BASOPCT 0.8 06/28/2020    MONOSABS 0.6 06/28/2020    LYMPHSABS 1.0 06/28/2020    EOSABS 0.2 06/28/2020    BASOSABS 0.1 06/28/2020     CMP:    Lab Results   Component Value Date     07/01/2020    K 4.2 07/01/2020    K 4.0 06/15/2020    CL 98 07/01/2020    CO2 27 07/01/2020    BUN 54 07/01/2020    CREATININE 3.75 07/01/2020    GFRAA 14.8 07/01/2020    LABGLOM 12.2 07/01/2020    GLUCOSE 108 07/01/2020    PROT 6.9 06/28/2020    LABALBU 4.0 06/28/2020    CALCIUM 10.2 07/01/2020    BILITOT 0.5 06/28/2020    ALKPHOS 105 06/28/2020    AST 45 06/28/2020    ALT 34 06/28/2020     BMP:    Lab Results   Component Value Date     07/01/2020    K 4.2 07/01/2020    K 4.0 06/15/2020    CL 98 07/01/2020    CO2 27 07/01/2020    BUN 54 07/01/2020    LABALBU 4.0 06/28/2020    CREATININE 3.75 07/01/2020    CALCIUM 10.2 07/01/2020    GFRAA 14.8 07/01/2020    LABGLOM 12.2 07/01/2020    GLUCOSE 108 07/01/2020     Magnesium:    Lab Results   Component Value Date    MG 2.0 07/01/2020     Troponin:    Lab Results   Component Value Date    TROPONINI <0.010 06/28/2020        Active Hospital Problems    Diagnosis Date Noted    Renal failure [N19] 06/28/2020     Priority: Low Assessment/Plan:  1. Advanced CKD - diuresing well. Continue same. she will receive HD today. 2. CAD- no cp. Recent LAD DEWEY- DAPT. 3. LVEF 60%  4.  HTN Stable       Electronically signed by Gunnar Andrew MD on 7/1/2020 at 11:39 AM

## 2020-07-01 NOTE — CONSULTS
[facility]    [facility])    History of heart failure     History of seizures     History of type C viral hepatitis     HTN (hypertension)     Hyperlipidemia     Impaired mobility and activities of daily living     Mediastinal lymphadenopathy 2013    [facility]    Metabolic syndrome     Neurogenic urinary incontinence 2013    Neuropathy in diabetes ([facility]     Obesity (BMI 30-39. 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)     Type 2 diabetes mellitus with renal manifestations, controlled ([facility] 2015    Insulin dependent, Dr Ajay Mcnamara Urinary incontinence due to cognitive impairment 2013    Vitamin D deficiency 2014       Social History     Socioeconomic History    Marital status:      Spouse name: None    Number of children: 2    Years of education: None    Highest education level: None   Occupational History    Occupation: disabled   Social Needs    Financial resource strain: None    Food insecurity     Worry: None     Inability: None    Transportation needs     Medical: None     Non-medical: None   Tobacco Use    Smoking status: Passive Smoke Exposure - Never Smoker    Smokeless tobacco: Never Used   Substance and Sexual Activity    Alcohol use: No     Alcohol/week: 0.0 standard drinks    Drug use: No    Sexual activity: Not Currently   Lifestyle    Physical activity     Days per week: None     Minutes per session: None    Stress: None   Relationships    Social connections     Talks on phone: None     Gets together: None     Attends [employer] service: None     Active member of club or organization: None     Attends meetings of clubs or organizations: None Relationship status: None    Intimate partner violence     Fear of current or ex partner: None     Emotionally abused: None     Physically abused: None     Forced sexual activity: None   Other Topics Concern    None   Social History Narrative    Born in [place], one of 5    Twin sister Reyes, very ill in 2018, [place] 89    Moved to [place], , 2 children, one son and one daughter    Worked at [employer], as a nurse's aide    Disabled due to mental illness    Lived at [employer] [employer], was discharged, returned to independent living in 2017 in the daughter's house and has adjusted well    One son and one daughter, live in the same house with patient, Sammie Vizcarra pays the rent    [employer] reading (misteries)       Allergies   Allergen Reactions    Codeine Hives     hives    Oxycontin [Oxycodone Hcl] Hives       No current facility-administered medications on file prior to encounter. Current Outpatient Medications on File Prior to Encounter   Medication Sig Dispense Refill    triamcinolone (KENALOG) 0.1 % cream APPLY TO AFFECTED AREA TWICE DAILY 80 g 0    sertraline (ZOLOFT) 50 MG tablet TAKE 1 TABLET BY MOUTH DAILY 90 tablet 0    nystatin (NYAMYC) 081870 UNIT/GM powder APPLY TO ABDOMINAL FOLDS EVERY 12 HOURS AS NEEDED 60 g 3    ticagrelor (BRILINTA) 90 MG TABS tablet Take 1 tablet by mouth 2 times daily 60 tablet 5    insulin aspart (NOVOLOG FLEXPEN) 100 UNIT/ML injection pen Inject into the skin 3 times daily (before meals) 12 units TID before meals. Also use sliding scale- 1 unit for every 50 over 150.       insulin glargine (LANTUS SOLOSTAR) 100 UNIT/ML injection pen Inject into the skin nightly Inject 35 units once nightly      Blood Glucose Monitoring Suppl (FREESTYLE LITE) CORETTA 1 Device by Does not apply route daily as needed Use freestyle meter to test blood sugar as needed      furosemide (LASIX) 40 MG tablet Take 2 tablets by mouth daily 90 tablet 1    ranolazine (RANEXA) 500 MG extended release tablet Take 1 tablet by mouth 2 times daily 60 tablet 3    ARIPiprazole (ABILIFY) 5 MG tablet Take 1 tablet by mouth daily 30 tablet 2    isosorbide mononitrate (IMDUR) 120 MG extended release tablet TAKE 1 TABLET BY MOUTH DAILY 90 tablet 1    pantoprazole (PROTONIX) 20 MG tablet TAKE 1 TABLET BY MOUTH DAILY 90 tablet 1    amLODIPine (NORVASC) 10 MG tablet TAKE 1 TABLET BY MOUTH DAILY 90 tablet 1    carvedilol (COREG) 12.5 MG tablet TAKE 1 TABLET BY MOUTH TWICE DAILY WITH MEALS 180 tablet 1    aspirin 81 MG tablet Take 1 tablet by mouth daily 90 tablet 3    atorvastatin (LIPITOR) 40 MG tablet Take 1 tablet by mouth daily 90 tablet 3    SURE COMFORT PEN NEEDLES 30G X 8 MM MISC USE AS DIRECTED FIVE TIMES A  each 10    melatonin 3 MG TABS tablet TAKE 1 TABLET BY MOUTH EVERY NIGHT AS NEEDED FOR INSOMNIA 180 tablet 4    magnesium oxide (MAG-OX) 400 MG tablet Take 400 mg by mouth daily      vitamin B-12 (CYANOCOBALAMIN) 100 MCG tablet TAKE 1 TABLET BY MOUTH DAILY 30 tablet 11    D3-1000 1000 units CAPS TAKE 1 CAPSULE BY MOUTH ONCE DAILY 90 capsule 2    pregabalin (LYRICA) 75 MG capsule Give one capsule po bid 60 capsule 1    nitroGLYCERIN (NITROSTAT) 0.4 MG SL tablet Place 1 tablet under the tongue every 5 minutes as needed for Chest pain         Review of Systems   Constitutional: Positive for fatigue. Negative for chills, diaphoresis and fever. HENT: Negative. Negative for congestion, ear pain, hearing loss and tinnitus. Eyes: Negative. Negative for photophobia. Respiratory: Positive for shortness of breath. Negative for cough and wheezing. Cardiovascular: Positive for leg swelling. Negative for chest pain and palpitations. Gastrointestinal: Negative. Negative for abdominal pain, constipation, diarrhea, nausea and vomiting. Genitourinary: Negative. Negative for dysuria, flank pain, frequency, hematuria and urgency. Musculoskeletal: Negative. Negative for back pain and neck pain. Skin: Negative. Negative for rash. Allergic/Immunologic: Negative for environmental allergies. Neurological: Negative. Negative for dizziness, tremors, weakness and headaches. Hematological: Does not bruise/bleed easily. Psychiatric/Behavioral: Negative. Negative for hallucinations and suicidal ideas. The patient is not nervous/anxious. OBJECTIVE:  BP (!) 176/78   Pulse 75   Temp 98.4 °F (36.9 °C) (Oral)   Resp 12   Ht 5' 7\" (1.702 m)   Wt 262 lb (118.8 kg)   LMP  (LMP Unknown)   SpO2 97%   BMI 41.04 kg/m²     Physical Exam  Constitutional:       General: She is not in acute distress. Appearance: She is well-developed. She is not diaphoretic. HENT:      Head: Normocephalic and atraumatic. Nose: Nose normal.      Mouth/Throat:      Pharynx: No oropharyngeal exudate. Eyes:      General: No scleral icterus. Right eye: No discharge. Left eye: No discharge. Conjunctiva/sclera: Conjunctivae normal.   Neck:      Musculoskeletal: Neck supple. Thyroid: No thyromegaly. Vascular: No JVD. Trachea: No tracheal deviation. Cardiovascular:      Rate and Rhythm: Normal rate and regular rhythm. Heart sounds: Normal heart sounds. No murmur. No friction rub. No gallop. Pulmonary:      Effort: No respiratory distress. Breath sounds: No stridor. Wheezing present. No rales. Chest:      Chest wall: No tenderness. Abdominal:      General: Bowel sounds are normal. There is no distension. Palpations: Abdomen is soft. There is no mass. Tenderness: There is no abdominal tenderness. There is no guarding or rebound. Hernia: No hernia is present. Musculoskeletal:         General: Swelling present. No tenderness or deformity. Right lower leg: Edema present. Left lower leg: Edema present. Skin:     General: Skin is dry. Coloration: Skin is not pale. Findings: No erythema or rash. Neurological:      Mental Status: She is alert and oriented to person, place, and time. Cranial Nerves: No cranial nerve deficit. Psychiatric:         Behavior: Behavior normal.         Thought Content: Thought content normal.         Judgment: Judgment normal.         ASSESSMENT ANDPLAN:    ASSESSMENT: Patient with acute on chronic kidney disease, now to the point requiring hemodialysis. Patient has concomitant congestive heart failure, diabetes, and pulmonary edema as well as other medical problems. PLAN: Place a permanent tunneled dialysis catheter in the right internal jugular vein under ultrasound and fluoroscopy guidance.     Vinh Escalante MD, Marline Mazariegos

## 2020-07-01 NOTE — PROGRESS NOTES
Treatment time: 180 minutes    Net UF: 1826 mL    Pre weight: 111.6 kg   Post weight: n/a  EDW: n/a    Access used: CVC right IJ  Access function: good    Medications or blood products given: n/a    Regular outpatient schedule: n/a    Summary of response to treatment: Pt tolerated tx fairly, tx terminated early due to bleeding from catheter insertion site, new dressing applied to site, Dr. Rachel Bruce notified, no new orders at this time, catheter clamped and locked with heparin, report given to [facility], RN. Copy of dialysis treatment record placed in chart, to be scanned into EMR.

## 2020-07-01 NOTE — PROGRESS NOTES
Jimi Moreira is a 56 y.o. female patient.  Pt was seen and evaluated, no overnight events    Current Facility-Administered Medications   Medication Dose Route Frequency Provider Last Rate Last Dose    sodium chloride 0.9 % infusion             ipratropium-albuterol (DUONEB) nebulizer solution 1 ampule  1 ampule Inhalation Q4H PRN Desiree Lucia MD   1 ampule at 06/30/20 1533    heparin (porcine) injection 2,000 Units  2,000 Units Intravenous Once Edmund Muñiz,         heparin (porcine) injection 4,000 Units  4,000 Units Intercatheter Once Edmund Muñiz,         epoetin chadwick-epbx (RETACRIT) injection 10,000 Units  10,000 Units Subcutaneous Once Edmund Muñiz, DO        ARIPiprazole (ABILIFY) tablet 5 mg  5 mg Oral Daily Danvers Longest, DO   5 mg at 06/30/20 1290    aspirin chewable tablet 81 mg  81 mg Oral Daily Danvers Longest, DO   Stopped at 07/01/20 0900    atorvastatin (LIPITOR) tablet 40 mg  40 mg Oral Daily Terese Longest, DO   40 mg at 06/30/20 1579    insulin lispro (HUMALOG) injection vial 13 Units  13 Units Subcutaneous TID WC Mildred Ivnay, DO   13 Units at 06/30/20 1235    insulin glargine (LANTUS) injection vial 30 Units  30 Units Subcutaneous Nightly Terese Longest, DO   30 Units at 06/30/20 2127    isosorbide mononitrate (IMDUR) extended release tablet 120 mg  120 mg Oral Daily Danvers Longest, DO   120 mg at 06/30/20 1934    magnesium oxide (MAG-OX) tablet 400 mg  400 mg Oral Daily Danvers Longest, DO   400 mg at 06/30/20 2651    melatonin tablet 3 mg  3 mg Oral Nightly PRN Danvers Longest, DO   3 mg at 06/30/20 2056    pantoprazole (PROTONIX) tablet 20 mg  20 mg Oral Daily Terese Longest, DO   20 mg at 06/30/20 0820    pregabalin (LYRICA) capsule 75 mg  75 mg Oral BID Terese Longest, DO   75 mg at 06/30/20 2052    ranolazine (RANEXA) extended release tablet 500 mg  500 mg Oral BID Terese Longest, DO   500 mg at 06/30/20 2052    sertraline (ZOLOFT) tablet 50 mg  50 mg Oral Daily Mildred Vinay, DO   50 mg at 06/30/20 0821    ticagrelor (BRILINTA) tablet 90 mg  90 mg Oral BID Elroy Early, DO   90 mg at 06/30/20 2052    vitamin B-12 (CYANOCOBALAMIN) tablet 100 mcg  100 mcg Oral Daily Elroy Early, DO   100 mcg at 06/30/20 3025    triamcinolone (KENALOG) 0.1 % cream   Topical Daily Elroy Early, DO        Vitamin D (CHOLECALCIFEROL) tablet 1,000 Units  1 tablet Oral Daily Elroy Early, DO   1,000 Units at 06/30/20 0821    glucose (GLUTOSE) 40 % oral gel 15 g  15 g Oral PRN Elroy Early, DO        dextrose 50 % IV solution  12.5 g Intravenous PRN Elroy Early, DO        glucagon (rDNA) injection 1 mg  1 mg Intramuscular PRN Elroy Early, DO        dextrose 5 % solution  100 mL/hr Intravenous PRN Elroy Early, DO        insulin lispro (HUMALOG) injection vial 0-6 Units  0-6 Units Subcutaneous TID WC Elroy Early, DO   1 Units at 06/30/20 1235    insulin lispro (HUMALOG) injection vial 0-3 Units  0-3 Units Subcutaneous Nightly Elroy Early, DO   2 Units at 06/30/20 2125    sodium chloride flush 0.9 % injection 10 mL  10 mL Intravenous 2 times per day Elroy Early, DO   10 mL at 06/30/20 0820    sodium chloride flush 0.9 % injection 10 mL  10 mL Intravenous PRN Elroy Early, DO        acetaminophen (TYLENOL) tablet 650 mg  650 mg Oral Q6H PRN Elroy Early, DO        Or    acetaminophen (TYLENOL) suppository 650 mg  650 mg Rectal Q6H PRN Elroy Early, DO        polyethylene glycol (GLYCOLAX) packet 17 g  17 g Oral Daily PRN Elroy Early, DO        promethazine (PHENERGAN) tablet 12.5 mg  12.5 mg Oral Q6H PRN Elroy Early, DO        Or    ondansetron (ZOFRAN) injection 4 mg  4 mg Intravenous Q6H PRN Elroy Early, DO        [Held by provider] enoxaparin (LOVENOX) injection 30 mg  30 mg Subcutaneous Daily Elroy Early, DO   Stopped at 07/01/20 0900    potassium chloride (KLOR-CON M) extended release tablet 40 mEq  40 mEq Oral PRN Elroy Early, DO        Or    potassium bicarb-citric acid (EFFER-K) effervescent tablet 40 mEq  40 mEq Oral PRN Juan Jose Em, DO        Or    potassium chloride 10 mEq/100 mL IVPB (Peripheral Line)  10 mEq Intravenous PRN Juan Jose Em, DO        potassium chloride 10 mEq/100 mL IVPB (Peripheral Line)  10 mEq Intravenous PRN Juan Jose Em, DO        furosemide (LASIX) 100 mg in dextrose 5 % 100 mL infusion  5 mg/hr Intravenous Continuous Juan Jose Em, DO 5 mL/hr at 07/01/20 0727 5 mg/hr at 07/01/20 0796    magnesium sulfate 2 g in 50 mL IVPB premix  2 g Intravenous PRN Juan Jose Em, DO        sodium polystyrene (KAYEXALATE) 15 GM/60ML suspension 15 g  15 g Oral Once Juan Jose Em, DO         Allergies   Allergen Reactions    Codeine Hives     hives    Oxycontin [Oxycodone Hcl] Hives     Active Problems:    Renal failure  Resolved Problems:    * No resolved hospital problems. *    Blood pressure (!) 176/78, pulse 75, temperature 98.4 °F (36.9 °C), temperature source Oral, resp. rate 12, height 5' 7\" (1.702 m), weight 262 lb (118.8 kg), SpO2 97 %, not currently breastfeeding. Subjective:  Symptoms:  She reports malaise and weakness. No shortness of breath, cough, chest pain, headache, chest pressure, anorexia, diarrhea or anxiety. Diet:  No nausea or vomiting. Objective:  General Appearance:  Comfortable, well-appearing and in no acute distress. Vital signs: (most recent): Blood pressure (!) 176/78, pulse 75, temperature 98.4 °F (36.9 °C), temperature source Oral, resp. rate 12, height 5' 7\" (1.702 m), weight 262 lb (118.8 kg), SpO2 97 %, not currently breastfeeding. HEENT: Normal HEENT exam.    Lungs:  Normal effort. Heart: Normal rate. Regular rhythm. S2 normal.    Abdomen: Abdomen is soft. Bowel sounds are normal.     Extremities: (+ edema)  Pulses: Distal pulses are intact. Neurological: Patient is alert. Pupils:  Pupils are equal, round, and reactive to light. Skin:  Warm and dry.       Lab Results   Component Value Date    WBC 7.3 06/28/2020    HGB 8.9 (L) 06/28/2020    HCT 26.2 (L) 06/28/2020    MCV 81.1 (L) 06/28/2020     06/28/2020     Lab Results   Component Value Date     07/01/2020    K 4.2 07/01/2020    K 4.0 06/15/2020    CL 98 07/01/2020    CO2 27 07/01/2020    BUN 54 07/01/2020    CREATININE 3.75 07/01/2020    GLUCOSE 108 07/01/2020    CALCIUM 10.2 07/01/2020        Assessment & Plan  1) LUCÍA on CKD stage 4 to 5 now ESRD going to be on HD  2) acute diastolic HF  3) CAD   4) COVID rule out  Second testing today if negative , take off isolation  C/w lasix drip  FU nephology and cardiology  Getting HD catheter today  Needs placement to KOV  5) chronic hep c  FU ID as outpt for treatment  Michelle Dodge MD  7/1/2020

## 2020-07-01 NOTE — FLOWSHEET NOTE
Patient went for vas cath placement this am, returned to the floor then was taken to dialysis per bed.

## 2020-07-01 NOTE — PROGRESS NOTES
Physical Therapy Missed Treatment   Facility/Department: [facility] MED SURG F574/H626-24    NAME: Haven Sneed    : 1960 (28 y.o.)  MRN: [id number]    Account: [de-identified]  Gender: female    Chart reviewed, attempted PT at 13:05. Patient unavailable 2° to:    [] Hold per nsg request    [] Pt declined     [] Nsg notified   [] Other notified    [x] Pt. . off floor for test/procedure. Pt is in Dialysis. [] Pt. Unavailable        Will attempt PT treatment again at earliest convenience.       Electronically signed by Soni Maria PTA on 20 at 1:09 PM EDT

## 2020-07-01 NOTE — PROGRESS NOTES
Nephrology Progress Note    Assessment:  ESRDX IHD  Schizophrenia  DM type-2  Hypertension  Hx Viral hepatitis-C  OHDX CAD  Hx Seizures      Plan: dialysis after v dialysis catheter placed    Patient Active Problem List:     Coronary artery disease involving native heart with angina pectoris (HCC)     Schizophrenia, paranoid, chronic     Metabolic syndrome     Vitamin B 12 deficiency     Cerebral microvascular disease     Mixed hyperlipidemia     Other hammer toe (acquired)     Vitamin D insufficiency     Incontinence of urine     Diabetic nephropathy with proteinuria (HCC)     HTN (hypertension)     History of type C viral hepatitis     Urinary incontinence due to cognitive impairment     History of seizures     Stented coronary artery-plan is to stay on Plavix indefinately per Dr Geo Velarde     Hemiparesis, left [facility])     Angina, class II ([address]     CKD (chronic kidney disease) stage 4, GFR 15-29 ml/min (HCC)     Chronic painful diabetic neuropathy (HCC)     Tardive dyskinesia     Shortness of breath     Uncontrolled type 2 diabetes mellitus with hyperglycemia (HCC)     Diastolic congestive heart failure (HCC)     Sleep apnea     Pulmonary hypertension (HCC)     Obesity, Class III, BMI 40-49.9 (morbid obesity) (HCC)     Edema     Closed supracondylar fracture of right humerus     Other chronic pain     Palliative care patient     Chest pain     Recurrent falls     Renal failure      Subjective:  Admit Date: 6/28/2020    Interval History:Feeling ok no issues    Medications:  Scheduled Meds:   heparin (porcine)  2,000 Units Intravenous Once    heparin (porcine)  4,000 Units Intercatheter Once    epoetin chadwick-epbx  10,000 Units Subcutaneous Once    ARIPiprazole  5 mg Oral Daily    aspirin  81 mg Oral Daily    atorvastatin  40 mg Oral Daily    insulin lispro  13 Units Subcutaneous TID WC    insulin glargine  30 Units Subcutaneous Nightly    isosorbide mononitrate  120 mg Oral Daily    magnesium oxide  400 mg Oral Daily    pantoprazole  20 mg Oral Daily    pregabalin  75 mg Oral BID    ranolazine  500 mg Oral BID    sertraline  50 mg Oral Daily    ticagrelor  90 mg Oral BID    vitamin B-12  100 mcg Oral Daily    triamcinolone   Topical Daily    Vitamin D  1 tablet Oral Daily    insulin lispro  0-6 Units Subcutaneous TID WC    insulin lispro  0-3 Units Subcutaneous Nightly    sodium chloride flush  10 mL Intravenous 2 times per day    [Held by provider] enoxaparin  30 mg Subcutaneous Daily    sodium polystyrene  15 g Oral Once     Continuous Infusions:   dextrose      furosemide (LASIX) 1mg/ml infusion 5 mg/hr (07/01/20 0727)       CBC:   Recent Labs     06/28/20  1400   WBC 7.3   HGB 8.9*        CMP:    Recent Labs     06/29/20  1115 06/30/20  0519 07/01/20  0530   * 137 138   K 3.9 3.9 4.2   CL 99 99 98   CO2 21 23 27   BUN 67* 63* 54*   CREATININE 3.97* 4.01* 3.75*   GLUCOSE 143* 110* 108*   CALCIUM 9.8 9.3 10.2*   LABGLOM 11.4* 11.3* 12.2*     Troponin:   Recent Labs     06/28/20  2355   TROPONINI <0.010     BNP: No results for input(s): BNP in the last 72 hours. INR: No results for input(s): INR in the last 72 hours. Lipids: No results for input(s): CHOL, LDLDIRECT, TRIG, HDL, AMYLASE, LIPASE in the last 72 hours. Liver:   Recent Labs     06/28/20  1400   AST 45*   ALT 34*   ALKPHOS 105   PROT 6.9   LABALBU 4.0   BILITOT 0.5     Iron:  No results for input(s): IRONS, FERRITIN in the last 72 hours. Invalid input(s): LABIRONS  Urinalysis: No results for input(s): UA in the last 72 hours.     Objective:  Vitals: BP (!) 171/68   Pulse 80   Temp 98.4 °F (36.9 °C) (Oral)   Resp 18   Ht 5' 7\" (1.702 m)   Wt 262 lb (118.8 kg)   LMP  (LMP Unknown)   SpO2 100%   BMI 41.04 kg/m²    Wt Readings from Last 3 Encounters:   06/29/20 262 lb (118.8 kg)   06/25/20 256 lb (116.1 kg)   06/28/20 237 lb (107.5 kg)      24HR INTAKE/OUTPUT:      Intake/Output Summary (Last 24 hours) at 7/1/2020 0905  Last data filed at 7/1/2020 0816  Gross per 24 hour   Intake 650.42 ml   Output 6900 ml   Net -6249.58 ml       General: alert, in no apparent distress  HEENT: normocephalic, atraumatic, anicteric  Neck: supple, no mass  Lungs: non-labored respirations, clear to auscultation bilaterally  Heart: regular rate and rhythm, 1/6 systolic murmurs or rubs  Abdomen: soft, non-tender, non-distended  Ext: no cyanosis, no peripheral edema   Neuro: alert and oriented, no gross abnormalities  Psych: normal mood and affect  Skin: no rash      Electronically signed by Kalyani Downey MD

## 2020-07-02 ENCOUNTER — OFFICE VISIT (OUTPATIENT)
Dept: GERIATRIC MEDICINE | Age: 60
End: 2020-07-02
Payer: MEDICARE

## 2020-07-02 ENCOUNTER — TELEPHONE (OUTPATIENT)
Dept: FAMILY MEDICINE CLINIC | Age: 60
End: 2020-07-02

## 2020-07-02 VITALS
HEIGHT: 67 IN | SYSTOLIC BLOOD PRESSURE: 141 MMHG | TEMPERATURE: 98.1 F | WEIGHT: 223 LBS | RESPIRATION RATE: 15 BRPM | HEART RATE: 82 BPM | DIASTOLIC BLOOD PRESSURE: 57 MMHG | BODY MASS INDEX: 35 KG/M2 | OXYGEN SATURATION: 100 %

## 2020-07-02 LAB
ANION GAP SERPL CALCULATED.3IONS-SCNC: 14 MEQ/L (ref 9–15)
BUN BLDV-MCNC: 31 MG/DL (ref 8–23)
CALCIUM SERPL-MCNC: 9.7 MG/DL (ref 8.5–9.9)
CHLORIDE BLD-SCNC: 98 MEQ/L (ref 95–107)
CO2: 27 MEQ/L (ref 20–31)
CREAT SERPL-MCNC: 2.67 MG/DL (ref 0.5–0.9)
GFR AFRICAN AMERICAN: 21.9
GFR NON-AFRICAN AMERICAN: 18.1
GLUCOSE BLD-MCNC: 145 MG/DL (ref 60–115)
GLUCOSE BLD-MCNC: 162 MG/DL (ref 70–99)
GLUCOSE BLD-MCNC: 176 MG/DL (ref 60–115)
GLUCOSE BLD-MCNC: 249 MG/DL (ref 60–115)
HEPATITIS B CORE TOTAL ANTIBODY: NEGATIVE
MAGNESIUM: 1.9 MG/DL (ref 1.7–2.4)
PERFORMED ON: ABNORMAL
POTASSIUM SERPL-SCNC: 3.9 MEQ/L (ref 3.4–4.9)
SARS-COV-2, NAAT: NOT DETECTED
SODIUM BLD-SCNC: 139 MEQ/L (ref 135–144)

## 2020-07-02 PROCEDURE — 90935 HEMODIALYSIS ONE EVALUATION: CPT

## 2020-07-02 PROCEDURE — 6360000002 HC RX W HCPCS: Performed by: INTERNAL MEDICINE

## 2020-07-02 PROCEDURE — 36415 COLL VENOUS BLD VENIPUNCTURE: CPT

## 2020-07-02 PROCEDURE — 83735 ASSAY OF MAGNESIUM: CPT

## 2020-07-02 PROCEDURE — 80048 BASIC METABOLIC PNL TOTAL CA: CPT

## 2020-07-02 PROCEDURE — 97116 GAIT TRAINING THERAPY: CPT

## 2020-07-02 PROCEDURE — 3044F HG A1C LEVEL LT 7.0%: CPT | Performed by: INTERNAL MEDICINE

## 2020-07-02 PROCEDURE — 99305 1ST NF CARE MODERATE MDM 35: CPT | Performed by: INTERNAL MEDICINE

## 2020-07-02 PROCEDURE — 2580000003 HC RX 258: Performed by: INTERNAL MEDICINE

## 2020-07-02 PROCEDURE — 99233 SBSQ HOSP IP/OBS HIGH 50: CPT | Performed by: INTERNAL MEDICINE

## 2020-07-02 PROCEDURE — U0002 COVID-19 LAB TEST NON-CDC: HCPCS

## 2020-07-02 PROCEDURE — 6370000000 HC RX 637 (ALT 250 FOR IP): Performed by: INTERNAL MEDICINE

## 2020-07-02 RX ORDER — HEPARIN SODIUM 1000 [USP'U]/ML
2100 INJECTION, SOLUTION INTRAVENOUS; SUBCUTANEOUS PRN
Status: DISCONTINUED | OUTPATIENT
Start: 2020-07-02 | End: 2020-07-02 | Stop reason: HOSPADM

## 2020-07-02 RX ORDER — CARVEDILOL 3.12 MG/1
3.12 TABLET ORAL 2 TIMES DAILY
Qty: 60 TABLET | Refills: 3 | Status: SHIPPED | OUTPATIENT
Start: 2020-07-02 | End: 2020-09-04 | Stop reason: SDUPTHER

## 2020-07-02 RX ORDER — ISOSORBIDE MONONITRATE 30 MG/1
30 TABLET, EXTENDED RELEASE ORAL DAILY
Status: DISCONTINUED | OUTPATIENT
Start: 2020-07-03 | End: 2020-07-02 | Stop reason: HOSPADM

## 2020-07-02 RX ORDER — IPRATROPIUM BROMIDE AND ALBUTEROL SULFATE 2.5; .5 MG/3ML; MG/3ML
3 SOLUTION RESPIRATORY (INHALATION) EVERY 4 HOURS PRN
Qty: 360 ML | Refills: 0 | Status: SHIPPED | OUTPATIENT
Start: 2020-07-02 | End: 2021-01-26

## 2020-07-02 RX ORDER — CARVEDILOL 3.12 MG/1
3.12 TABLET ORAL 2 TIMES DAILY
Status: DISCONTINUED | OUTPATIENT
Start: 2020-07-02 | End: 2020-07-02 | Stop reason: HOSPADM

## 2020-07-02 RX ORDER — ISOSORBIDE MONONITRATE 30 MG/1
30 TABLET, EXTENDED RELEASE ORAL DAILY
Qty: 30 TABLET | Refills: 3 | Status: SHIPPED | OUTPATIENT
Start: 2020-07-03 | End: 2020-07-27 | Stop reason: SDUPTHER

## 2020-07-02 RX ADMIN — ASPIRIN 81 MG 81 MG: 81 TABLET ORAL at 08:35

## 2020-07-02 RX ADMIN — TRIAMCINOLONE ACETONIDE: 1 CREAM TOPICAL at 08:36

## 2020-07-02 RX ADMIN — INSULIN LISPRO 13 UNITS: 100 INJECTION, SOLUTION INTRAVENOUS; SUBCUTANEOUS at 17:11

## 2020-07-02 RX ADMIN — INSULIN LISPRO 13 UNITS: 100 INJECTION, SOLUTION INTRAVENOUS; SUBCUTANEOUS at 08:36

## 2020-07-02 RX ADMIN — PANTOPRAZOLE SODIUM 20 MG: 20 TABLET, DELAYED RELEASE ORAL at 08:35

## 2020-07-02 RX ADMIN — ATORVASTATIN CALCIUM 40 MG: 40 TABLET, FILM COATED ORAL at 08:35

## 2020-07-02 RX ADMIN — FUROSEMIDE 5 MG/HR: 10 INJECTION, SOLUTION INTRAVENOUS at 02:18

## 2020-07-02 RX ADMIN — Medication 400 MG: at 08:35

## 2020-07-02 RX ADMIN — HEPARIN SODIUM 2000 UNITS: 1000 INJECTION INTRAVENOUS; SUBCUTANEOUS at 11:48

## 2020-07-02 RX ADMIN — HEPARIN SODIUM 2100 UNITS: 1000 INJECTION, SOLUTION INTRAVENOUS; SUBCUTANEOUS at 13:04

## 2020-07-02 RX ADMIN — VITAMIN D, TAB 1000IU (100/BT) 1000 UNITS: 25 TAB at 08:35

## 2020-07-02 RX ADMIN — ISOSORBIDE MONONITRATE 120 MG: 120 TABLET ORAL at 08:35

## 2020-07-02 RX ADMIN — ARIPIPRAZOLE 5 MG: 5 TABLET ORAL at 08:35

## 2020-07-02 RX ADMIN — INSULIN LISPRO 1 UNITS: 100 INJECTION, SOLUTION INTRAVENOUS; SUBCUTANEOUS at 08:36

## 2020-07-02 RX ADMIN — HEPARIN SODIUM 2100 UNITS: 1000 INJECTION, SOLUTION INTRAVENOUS; SUBCUTANEOUS at 13:02

## 2020-07-02 RX ADMIN — PREGABALIN 75 MG: 75 CAPSULE ORAL at 08:35

## 2020-07-02 RX ADMIN — TICAGRELOR 90 MG: 90 TABLET ORAL at 08:36

## 2020-07-02 RX ADMIN — INSULIN LISPRO 2 UNITS: 100 INJECTION, SOLUTION INTRAVENOUS; SUBCUTANEOUS at 17:11

## 2020-07-02 RX ADMIN — SERTRALINE HYDROCHLORIDE 50 MG: 50 TABLET ORAL at 08:35

## 2020-07-02 RX ADMIN — RANOLAZINE 500 MG: 500 TABLET, FILM COATED, EXTENDED RELEASE ORAL at 08:35

## 2020-07-02 ASSESSMENT — PAIN DESCRIPTION - PROGRESSION
CLINICAL_PROGRESSION: GRADUALLY WORSENING

## 2020-07-02 ASSESSMENT — ENCOUNTER SYMPTOMS
STRIDOR: 0
EYES NEGATIVE: 1
BLOOD IN STOOL: 0
COUGH: 0
CHEST TIGHTNESS: 0
SHORTNESS OF BREATH: 1
WHEEZING: 0
GASTROINTESTINAL NEGATIVE: 1
NAUSEA: 0

## 2020-07-02 ASSESSMENT — PAIN SCALES - WONG BAKER
WONGBAKER_NUMERICALRESPONSE: 0

## 2020-07-02 ASSESSMENT — PAIN SCALES - GENERAL
PAINLEVEL_OUTOF10: 0

## 2020-07-02 NOTE — PROGRESS NOTES
Progress Note  Patient: Cha Mascorro  Unit/Bed: R566/D861-22  YOB: 1960  MRN: [id number]  Acct: [de-identified]   Admitting Diagnosis: Renal failure [N19]  Admit Date:  2020  Hospital Day: 4    Chief Complaint: SOB CKD CAD AF    Histories:  Past Medical History:   Diagnosis Date    Anxiety     CAD S/P percutaneous coronary angioplasty ,     stents per dr Myriam Morales CKD stage 4 due to type 2 diabetes mellitus ([address]     Diabetic nephropathy with proteinuria ([address]     DJD (degenerative joint disease) of knee     Dr Vee Haley GERD (gastroesophageal reflux disease)     [facility]ip[facility]is[facility] 2013    entered Assisted Living ([facility])    History of heart failure     History of seizures     History of type C viral hepatitis     HTN (hypertension)     Hyperlipidemia     Impaired mobility and activities of daily living     Mediastinal lymphadenopathy 2013    Dr Nichole Pak, [facility]    Metabolic syndrome     Neurogenic urinary incontinence 2013    Neuropathy in diabetes ([address]     Obesity (BMI 30-39. 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 Lupe Hackett      x1 Dr. Jona Ivory, Dr Sherryle Charlotte CATH LAB PROCEDURE  10/02/2019    HYSTERECTOMY, TOTAL ABDOMINAL      one ovary intact, Dr Dominic Wright, illness    Lived at [facility], was discharged, returned to independent living in 2017 in the daughter's house and has adjusted well    One son and one daughter, live in the same house with patient, Sammie Vizcarra pays the rent    Hobbies reading (misteries)       Subjective/HPI pt again receiving HD. Had 1st HD yesterday with removal 1.5 L. Today's goal is 3L out. Lots of oozing from new HD Catheter site. EKG:SR 76s  Had several rapid AF overnight to rate 140s. Review of Systems:   Review of Systems   Constitutional: Negative. Negative for diaphoresis and fatigue. HENT: Negative. Eyes: Negative. Respiratory: Positive for shortness of breath. Negative for cough, chest tightness, wheezing and stridor. Cardiovascular: Negative. Negative for chest pain, palpitations and leg swelling. Gastrointestinal: Negative. Negative for blood in stool and nausea. Genitourinary: Negative. Musculoskeletal: Negative. Skin: Negative. Neurological: Negative. Negative for dizziness, syncope, weakness and light-headedness. Hematological: Negative. Psychiatric/Behavioral: Negative. Physical Examination:    /60   Pulse 72   Temp 98.1 °F (36.7 °C)   Resp 18   Ht 5' 7\" (1.702 m)   Wt 226 lb 8 oz (102.7 kg)   LMP  (LMP Unknown)   SpO2 99%   BMI 35.47 kg/m²    Physical Exam   Constitutional: She appears healthy. No distress. HENT:   Normal cephalic and Atraumatic   Eyes: Pupils are equal, round, and reactive to light. Neck: Normal range of motion and thyroid normal. Neck supple. No JVD present. No neck adenopathy. No thyromegaly present. Cardiovascular: Normal rate, regular rhythm, intact distal pulses and normal pulses. Murmur heard. Pulmonary/Chest: Effort normal and breath sounds normal. She has no wheezes. She has no rales. She exhibits no tenderness. Abdominal: Soft. Bowel sounds are normal. There is no abdominal tenderness.    Musculoskeletal: Normal range of motion. General: No tenderness or edema. Neurological: She is alert and oriented to person, place, and time. Skin: Skin is warm. No cyanosis. Nails show no clubbing.        LABS:  CBC:   Lab Results   Component Value Date    WBC 7.3 06/28/2020    RBC 3.23 06/28/2020    HGB 8.9 06/28/2020    HCT 26.2 06/28/2020    MCV 81.1 06/28/2020    MCH 27.4 06/28/2020    MCHC 33.8 06/28/2020    RDW 14.6 06/28/2020     06/28/2020    MPV 10.0 03/05/2020     CBC with Differential:    Lab Results   Component Value Date    WBC 7.3 06/28/2020    RBC 3.23 06/28/2020    HGB 8.9 06/28/2020    HCT 26.2 06/28/2020     06/28/2020    MCV 81.1 06/28/2020    MCH 27.4 06/28/2020    MCHC 33.8 06/28/2020    RDW 14.6 06/28/2020    BANDSPCT 5 06/14/2013    LYMPHOPCT 14.0 06/28/2020    MONOPCT 7.6 06/28/2020    EOSPCT 3.5 03/05/2020    BASOPCT 0.8 06/28/2020    MONOSABS 0.6 06/28/2020    LYMPHSABS 1.0 06/28/2020    EOSABS 0.2 06/28/2020    BASOSABS 0.1 06/28/2020     CMP:    Lab Results   Component Value Date     07/02/2020    K 3.9 07/02/2020    K 4.0 06/15/2020    CL 98 07/02/2020    CO2 27 07/02/2020    BUN 31 07/02/2020    CREATININE 2.67 07/02/2020    GFRAA 21.9 07/02/2020    LABGLOM 18.1 07/02/2020    GLUCOSE 162 07/02/2020    PROT 6.9 06/28/2020    LABALBU 4.0 06/28/2020    CALCIUM 9.7 07/02/2020    BILITOT 0.5 06/28/2020    ALKPHOS 105 06/28/2020    AST 45 06/28/2020    ALT 34 06/28/2020     BMP:    Lab Results   Component Value Date     07/02/2020    K 3.9 07/02/2020    K 4.0 06/15/2020    CL 98 07/02/2020    CO2 27 07/02/2020    BUN 31 07/02/2020    LABALBU 4.0 06/28/2020    CREATININE 2.67 07/02/2020    CALCIUM 9.7 07/02/2020    GFRAA 21.9 07/02/2020    LABGLOM 18.1 07/02/2020    GLUCOSE 162 07/02/2020     Magnesium:    Lab Results   Component Value Date    MG 1.9 07/02/2020     Troponin:    Lab Results   Component Value Date    TROPONINI <0.010 06/28/2020        Active Hospital Problems    Diagnosis Date Noted    Renal failure [N19] 06/28/2020     Priority: Low        Assessment/Plan:  1. Advanced CKD - started HD. 2. PAF- this is new. Will resume BB. Will need NOAC but wait 3 days to start since all the oozing from access site. 3. CAD- no cp. Recent LAD DEWEY- DAPT. 4. LVEF 60%  5. HTN Stable.  BP will drop with HD so will decrease Imdur to 30 from 471.   6. Precert for SNF       Electronically signed by Katja Delgado MD on 7/2/2020 at 11:50 AM

## 2020-07-02 NOTE — FLOWSHEET NOTE
Shift assessment complete. Patient denies sob, cp, n/v. Patient A&Ox4, Bp is elevated. Patient vasc cath is bleeding, dressing is saturated. Dressing will be changed. Patient ambulated to BR and had BM. Patient has no further needs at this time. Bed alarm. Call light within reach. Will continue to monitor.

## 2020-07-02 NOTE — PLAN OF CARE
Problem: Falls - Risk of:  Goal: Will remain free from falls  Description: Will remain free from falls  Outcome: Ongoing  Goal: Absence of physical injury  Description: Absence of physical injury  Outcome: Ongoing     Problem: Urinary Retention:  Goal: Urinary elimination within specified parameters  Description: Urinary elimination within specified parameters  Outcome: Ongoing  Goal: Able to perform urinary catheter care  Description: Able to perform urinary catheter care  Outcome: Ongoing  Goal: Able to perform urinary self-catheterization  Description: Able to perform urinary self-catheterization  Outcome: Ongoing  Goal: Ability to reestablish a normal urinary elimination pattern will improve - after catheter removal  Description: Ability to reestablish a normal urinary elimination pattern will improve - after catheter removal  Outcome: Ongoing  Goal: Ability to recognize the need to void and respond appropriately will improve  Description: Ability to recognize the need to void and respond appropriately will improve  Outcome: Ongoing  Goal: Absence of postvoid residual urine  Description: Absence of postvoid residual urine  Outcome: Ongoing     Problem: Tissue Perfusion - Renal, Altered:  Goal: Electrolytes within specified parameters  Description: Electrolytes within specified parameters  Outcome: Ongoing  Goal: Urine creatinine clearance will be within specified parameters  Description: Urine creatinine clearance will be within specified parameters  Outcome: Ongoing  Goal: Serum creatinine will be within specified parameters  Description: Serum creatinine will be within specified parameters  Outcome: Ongoing  Goal: Ability to achieve a balanced intake and output will improve  Description: Ability to achieve a balanced intake and output will improve  Outcome: Ongoing     Problem: IP DRESSINGS LOWER EXTREMITIES  Goal: LTG - patient will dress lower body with or without assistive device  Outcome: Ongoing Problem: Mobility - Impaired:  Goal: Mobility will improve  Description: Therapy evaluation completed. Please see daily notes and/or progress notes for details related to planned treatment interventions, goals and functional abilities. Outcome: Ongoing     Problem: Skin Integrity:  Goal: Will show no infection signs and symptoms  Description: Will show no infection signs and symptoms  Outcome: Ongoing  Goal: Absence of new skin breakdown  Description: Absence of new skin breakdown  Outcome: Ongoing  Goal: Status of oral mucous membranes will improve  Description: Status of oral mucous membranes will improve  Outcome: Ongoing  Goal: Skin integrity will be maintained  Description: Skin integrity will be maintained  Outcome: Ongoing     Problem:  Activity:  Goal: Fatigue will decrease  Description: Fatigue will decrease  Outcome: Ongoing  Goal: Risk for activity intolerance will decrease  Description: Risk for activity intolerance will decrease  Outcome: Ongoing     Problem: Coping:  Goal: Ability to cope will improve  Description: Ability to cope will improve  Outcome: Ongoing     Problem: Fluid Volume:  Goal: Will show no signs or symptoms of fluid imbalance  Description: Will show no signs or symptoms of fluid imbalance  Outcome: Ongoing     Problem: Health Behavior:  Goal: Ability to manage health-related needs will improve  Description: Ability to manage health-related needs will improve  Outcome: Ongoing  Goal: Identification of resources available to assist in meeting health care needs will improve  Description: Identification of resources available to assist in meeting health care needs will improve  Outcome: Ongoing     Problem: Nutritional:  Goal: Ability to identify appropriate dietary choices will improve  Description: Ability to identify appropriate dietary choices will improve  Outcome: Ongoing     Problem: Physical Regulation:  Goal: Ability to maintain clinical measurements within normal limits will improve  Description: Ability to maintain clinical measurements within normal limits will improve  Outcome: Ongoing  Goal: Complications related to the disease process, condition or treatment will be avoided or minimized  Description: Complications related to the disease process, condition or treatment will be avoided or minimized  Outcome: Ongoing     Problem: Sensory:  Goal: General experience of comfort will improve  Description: General experience of comfort will improve  Outcome: Ongoing

## 2020-07-02 NOTE — PROGRESS NOTES
Nephrology Progress Note    Assessment:  ESRDX IHD  OHDX  Hypertension  DM type-2  Anemia  Shizophrenia      Plan: dialysis today 3 hours  Send to [facility] [place] [facility]    Patient Active Problem List:     Coronary artery disease involving native heart with angina pectoris (HCC)     Schizophrenia, paranoid, chronic     Metabolic syndrome     Vitamin B 12 deficiency     Cerebral microvascular disease     Mixed hyperlipidemia     Other hammer toe (acquired)     Vitamin D insufficiency     Incontinence of urine     Diabetic nephropathy with proteinuria (HCC)     HTN (hypertension)     History of type C viral hepatitis     Urinary incontinence due to cognitive impairment     History of seizures     Stented coronary artery-plan is to stay on Plavix indefinately per Dr Hurman Harada     Hemiparesis, left [facility])     Angina, class II ([address]     CKD (chronic kidney disease) stage 4, GFR 15-29 ml/min (HCC)     Chronic painful diabetic neuropathy (HCC)     Tardive dyskinesia     Shortness of breath     Uncontrolled type 2 diabetes mellitus with hyperglycemia (HCC)     Diastolic congestive heart failure (HCC)     Sleep apnea     Pulmonary hypertension (HCC)     Obesity, Class III, BMI 40-49.9 (morbid obesity) (HCC)     Edema     Closed supracondylar fracture of right humerus     Other chronic pain     Palliative care patient     Chest pain     Recurrent falls     Renal failure      Subjective:  Admit Date: 6/28/2020    Interval History: no issues    Medications:  Scheduled Meds:   heparin (porcine)  2,000 Units Intravenous Once    epoetin chadwick-epbx  10,000 Units Subcutaneous Once    ARIPiprazole  5 mg Oral Daily    aspirin  81 mg Oral Daily    atorvastatin  40 mg Oral Daily    insulin lispro  13 Units Subcutaneous TID WC    insulin glargine  30 Units Subcutaneous Nightly    isosorbide mononitrate  120 mg Oral Daily    magnesium oxide  400 mg Oral Daily    pantoprazole  20 mg Oral Daily    pregabalin  75 mg Oral BID    ranolazine  500 mg Oral BID    sertraline  50 mg Oral Daily    ticagrelor  90 mg Oral BID    vitamin B-12  100 mcg Oral Daily    triamcinolone   Topical Daily    Vitamin D  1 tablet Oral Daily    insulin lispro  0-6 Units Subcutaneous TID WC    insulin lispro  0-3 Units Subcutaneous Nightly    sodium chloride flush  10 mL Intravenous 2 times per day    [Held by provider] enoxaparin  30 mg Subcutaneous Daily    sodium polystyrene  15 g Oral Once     Continuous Infusions:   dextrose      furosemide (LASIX) 1mg/ml infusion 5 mg/hr (07/02/20 0218)       CBC: No results for input(s): WBC, HGB, PLT in the last 72 hours. CMP:    Recent Labs     06/30/20  0519 07/01/20  0530 07/02/20  0522    138 139   K 3.9 4.2 3.9   CL 99 98 98   CO2 23 27 27   BUN 63* 54* 31*   CREATININE 4.01* 3.75* 2.67*   GLUCOSE 110* 108* 162*   CALCIUM 9.3 10.2* 9.7   LABGLOM 11.3* 12.2* 18.1*     Troponin: No results for input(s): TROPONINI in the last 72 hours. BNP: No results for input(s): BNP in the last 72 hours. INR: No results for input(s): INR in the last 72 hours. Lipids: No results for input(s): CHOL, LDLDIRECT, TRIG, HDL, AMYLASE, LIPASE in the last 72 hours. Liver: No results for input(s): AST, ALT, ALKPHOS, PROT, LABALBU, BILITOT in the last 72 hours. Invalid input(s): BILDIR  Iron:  No results for input(s): IRONS, FERRITIN in the last 72 hours. Invalid input(s): LABIRONS  Urinalysis: No results for input(s): UA in the last 72 hours.     Objective:  Vitals: BP (!) 150/71   Pulse 79   Temp 99 °F (37.2 °C) (Oral)   Resp 18   Ht 5' 7\" (1.702 m)   Wt 230 lb 1.6 oz (104.4 kg)   LMP  (LMP Unknown)   SpO2 99%   BMI 36.04 kg/m²    Wt Readings from Last 3 Encounters:   07/02/20 230 lb 1.6 oz (104.4 kg)   06/25/20 256 lb (116.1 kg)   06/28/20 237 lb (107.5 kg)      24HR INTAKE/OUTPUT:      Intake/Output Summary (Last 24 hours) at 7/2/2020 0922  Last data filed at 7/2/2020 0846  Gross per 24 hour   Intake 963.67 ml   Output 6750 ml   Net -5786.33 ml       General: alert, in no apparent distress  HEENT: normocephalic, atraumatic, anicteric  Neck: supple, no mass  Chest catheter left wall   Lungs: non-labored respirations, clear to auscultation bilaterally  Heart: regular rate and rhythm, no murmurs or rubs  Abdomen: soft, non-tender, non-distended obese  Ext: no cyanosis, no peripheral edema  Neuro: alert and oriented, no gross abnormalities  Psych: normal mood and affect  Skin: no rash      Electronically signed by Manoj Joshi MD

## 2020-07-02 NOTE — FLOWSHEET NOTE
8581: Handoff complete. This RN assumed care of the pt. Pt resting in bed with no stated needs. 0159: Assessment completed. Pt is A&Ox4. PERRLA. Lungs are clear bilaterally. No SOB or CP. S1, S2 noted. Bowel sounds are active X4. Pulses palpable. Generalized edema noted. Pt   0953: Perfect serve sent to Dr. Shauna Mcnamara regarding the lasix drip who stated it could be d/c'd  1010: pt off floor to dialysis  1102: the monitor room called to report that the pt had a 6 second burst of a-fib.; Notified Dr. Elliott Maurer via perfect serve  [phone] [id number]: notified Dr. Janelle Carballo of new a-fib via perfect serve  125 [id number]: pt returned from dialysis. VSS. Glucose 145. Reynolds removed. 100ml urine emptied. Pt set up to eat lunch  1529: pt has been accepted to [facility].  COVID sent to the lab  21 : report called to [facility])

## 2020-07-02 NOTE — TELEPHONE ENCOUNTER
[address] [place] [address] called and was wondering if the provider would fill out a CMN for the patient to get her albuterol. The order was placed by Sofia Marycruz but he is refusing to sing the paperwork and stated that it would need to go to the pulmonologist. Pharmacy stated that sense the patient hasn't seen pulm in a year that the paper work got get denied for the patient. Pharmacy also stated that if the provider doesn't want to sign paper work that she can have the medication filled locally for the patient.

## 2020-07-02 NOTE — PROGRESS NOTES
Physical Therapy Missed Treatment   Facility/Department: [facility] MED SURG X742/G320-00    NAME: Jeff Caceres    : 1960 (5 y.o.)  MRN: [id number]    Account: [de-identified]  Gender: female    Chart reviewed, attempted PT at 1125. Patient unavailable 2° to:    [] Hold per nsg request    [] Pt declined    [] Nsg notified   [] Other notified    [] Pt. . off floor for test/procedure. [x] Pt. Unavailable: dialysis      Will attempt PT treatment again at earliest convenience.       Electronically signed by Lucia Conroy PT on 20 at 11:25 AM EDT

## 2020-07-02 NOTE — PROGRESS NOTES
Treatment time: 135 minutes    Net UF: 2000 ml    Pre weight: 102.9 kg  Post weight: 101.2 kg  EDW: n/a    Access used: CVC right IJ  Access function: good    Medications or blood products given: 2000 units heparin    Regular outpatient schedule: n/a    Summary of response to treatment: Pt tolerated tx fairly, tx terminated 45 minutes early due to hypotension, Dr. Anita Randolph notified, no new orders at this time, report given to [address] Amberly RN. Copy of dialysis treatment record placed in chart, to be scanned into EMR.

## 2020-07-02 NOTE — DISCHARGE SUMMARY
Physician Discharge Summary     Patient ID:  Avril North  [id number]  81 y.o.  1960    Admit date: 6/28/2020    Discharge date and time: 7/2/20    Admitting Physician: Jesus Hansen MD     Discharge Physician: [facility]    Admission Diagnoses: Renal failure [N19]    Discharge Diagnoses: ESRD on HD  LUCÍA on CJD  Acute diastolic HF  Chronic hep c  afib now sinus  BB resumed by cardiology    Past Medical History:   Diagnosis Date    Anxiety     CAD S/P percutaneous coronary angioplasty 2015, 2018    stents per dr Moe Bond CKD stage 4 due to type 2 diabetes mellitus ([address]     Diabetic nephropathy with proteinuria ([address] 2014    DJD (degenerative joint disease) of knee     Dr Jessie Reyes GERD (gastroesophageal reflux disease)     Hemiparesis, left ([address] 2013    entered Assisted Living ([facility])    History of heart failure     History of seizures     History of type C viral hepatitis     HTN (hypertension)     Hyperlipidemia     Impaired mobility and activities of daily living     Mediastinal lymphadenopathy 2013    Dr Khadijah Andrea, [facility]    Metabolic syndrome     Neurogenic urinary incontinence 2013    Neuropathy in diabetes ([address]     Obesity (BMI 30-39. 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 ([address]     Type 2 diabetes mellitus with renal manifestations, controlled ([address] 2015    Insulin dependent, Dr Von Sweet Urinary incontinence due to cognitive impairment 2013    Vitamin D deficiency 2014         Admission Condition: fair    Discharged Condition: stable  Indication for Admission: fluid overload    Hospital Course: lasix trip  HD catheter was place by IR  Started on HD  Covid rule melatonin tablet 3 mg  3 mg Oral Nightly PRN Donnalee Oh, DO   3 mg at 06/30/20 2056    pantoprazole (PROTONIX) tablet 20 mg  20 mg Oral Daily Donnalee Oh, DO   20 mg at 07/02/20 0835    pregabalin (LYRICA) capsule 75 mg  75 mg Oral BID Donnalee Oh, DO   75 mg at 07/02/20 0835    ranolazine (RANEXA) extended release tablet 500 mg  500 mg Oral BID Donnalee Oh, DO   500 mg at 07/02/20 0835    sertraline (ZOLOFT) tablet 50 mg  50 mg Oral Daily Donnalee Oh, DO   50 mg at 07/02/20 0835    ticagrelor (BRILINTA) tablet 90 mg  90 mg Oral BID Donnalee Oh, DO   90 mg at 07/02/20 4258    vitamin B-12 (CYANOCOBALAMIN) tablet 100 mcg  100 mcg Oral Daily Donnalee Oh, DO   100 mcg at 07/01/20 1834    triamcinolone (KENALOG) 0.1 % cream   Topical Daily Donnalee Oh, DO        Vitamin D (CHOLECALCIFEROL) tablet 1,000 Units  1 tablet Oral Daily Donnalee Oh, DO   1,000 Units at 07/02/20 0835    glucose (GLUTOSE) 40 % oral gel 15 g  15 g Oral PRN Donnalee Oh, DO        dextrose 50 % IV solution  12.5 g Intravenous PRN Donnalee Oh, DO        glucagon (rDNA) injection 1 mg  1 mg Intramuscular PRN Donnalee Oh, DO        dextrose 5 % solution  100 mL/hr Intravenous PRN Donnalee Oh, DO        insulin lispro (HUMALOG) injection vial 0-6 Units  0-6 Units Subcutaneous TID WC Donnalee Oh, DO   1 Units at 07/02/20 0836    insulin lispro (HUMALOG) injection vial 0-3 Units  0-3 Units Subcutaneous Nightly Donnalee Oh, DO   1 Units at 07/01/20 2336    sodium chloride flush 0.9 % injection 10 mL  10 mL Intravenous 2 times per day Donnalee Oh, DO   10 mL at 06/30/20 0820    sodium chloride flush 0.9 % injection 10 mL  10 mL Intravenous PRN Donnalee Oh, DO        acetaminophen (TYLENOL) tablet 650 mg  650 mg Oral Q6H PRN Donnalee Oh, DO        Or    acetaminophen (TYLENOL) suppository 650 mg  650 mg Rectal Q6H PRN Donroyceee Oh, DO        polyethylene glycol (GLYCOLAX) packet 17 g  17 g Oral Daily PRN Dayneee Oh, DO        promethazine (PHENERGAN) tablet 12.5 mg  12.5 mg Oral Q6H PRN Emmanuelle Specter, DO        Or    ondansetron (ZOFRAN) injection 4 mg  4 mg Intravenous Q6H PRN Emmanuelle Specter, DO        [Held by provider] enoxaparin (LOVENOX) injection 30 mg  30 mg Subcutaneous Daily Melvin Specter, DO   Stopped at 07/01/20 0900    potassium chloride (KLOR-CON M) extended release tablet 40 mEq  40 mEq Oral PRN Emmanuelle Specter, DO        Or    potassium bicarb-citric acid (EFFER-K) effervescent tablet 40 mEq  40 mEq Oral PRN Emmanuelle Specter, DO        Or    potassium chloride 10 mEq/100 mL IVPB (Peripheral Line)  10 mEq Intravenous PRN Melvin Specter, DO        potassium chloride 10 mEq/100 mL IVPB (Peripheral Line)  10 mEq Intravenous PRN Emmanuelle Specter, DO        furosemide (LASIX) 100 mg in dextrose 5 % 100 mL infusion  5 mg/hr Intravenous Continuous Emmanuelle Specter, DO 5 mL/hr at 07/02/20 0218 5 mg/hr at 07/02/20 0218    magnesium sulfate 2 g in 50 mL IVPB premix  2 g Intravenous PRN Melvin Specter, DO        sodium polystyrene (KAYEXALATE) 15 GM/60ML suspension 15 g  15 g Oral Once Emmanuelle Specter, DO           Discharge Exam:  HEENT: AT/NC, PERRLA, no JVD  HEART: s1/s2 wnl w/o s3  LUNG: clear  ABD: soft, NT  EXT: no edema  SKin : no rash  Neuro:no focal deficits      Disposition: home    Patient Instructions:   [unfilled]  Activity: as tolerated  Diet: renal diet    Follow-up with PC in  1 week for hep c  OAC as per cardiology  Pt was oozing from the catheter site so they  recommended that to hold any [facility] for now   Possible restart [facility] as outpt  Overtime on dc summary was 45 min  Signed:  Marquis Antony  7/2/2020  11:09 AM

## 2020-07-02 NOTE — CARE COORDINATION
Sonya Nam has accepted the pt for admission today. Dialysis was set up and pt will be a T, TH,S at 11:40 with her first day being Tuesday. Pt and KOV aware.

## 2020-07-02 NOTE — PROGRESS NOTES
Physical Therapy Med Surg Daily Treatment Note  Facility/Department: [facility]  [id number]       NAME: Jerri Arana  : 1960 (56 y.o.)  MRN: [id number]  CODE STATUS: Full Code    Date of Service: 2020    Patient Diagnosis(es): Renal failure [N19]   Chief Complaint   Patient presents with    Leg Swelling      pt was at [facility] for rehab and was d/c yesterday. pt states gaining 22lbs within 2weeks.     Shortness of Breath     Patient Active Problem List    Diagnosis Date Noted    Renal failure 2020    Recurrent falls 2020    Chest pain 2020    Edema 2019    Closed supracondylar fracture of right humerus 2019    Other chronic pain 2019    Palliative care patient 2019    Obesity, Class III, BMI 40-49.9 (morbid obesity) ([address] 2019    Sleep apnea 2019    Pulmonary hypertension ([address]     Diastolic congestive heart failure ([address] 10/04/2019    Uncontrolled type 2 diabetes mellitus with hyperglycemia (HCC)     Shortness of breath 10/02/2019    Tardive dyskinesia 2019    Chronic painful diabetic neuropathy (HCC)     CKD (chronic kidney disease) stage 4, GFR 15-29 ml/min (HCC) 2018    Angina, class II ([address]     Stented coronary artery-plan is to stay on Plavix indefinately per Dr David Gill 2016    History of seizures     Urinary incontinence due to cognitive impairment     HTN (hypertension)     History of type C viral hepatitis     Diabetic nephropathy with proteinuria ([address]     Incontinence of urine 2014    Vitamin D insufficiency 2014    Vitamin B 12 deficiency 2013    Cerebral microvascular disease 2013    Hemiparesis, left ([address] 2013    Coronary artery disease involving native heart with angina pectoris (HCC)     Schizophrenia, paranoid, chronic     Metabolic syndrome     Mixed hyperlipidemia 2010    Other hammer toe (acquired) 2007        Past Medical History:   Diagnosis Date    Anxiety     CAD S/P percutaneous coronary angioplasty , 2018    stents per dr Sandra Barajas CKD stage 4 due to type 2 diabetes mellitus [facility]     Diabetic nephropathy with proteinuria ([facility]     DJD (degenerative joint disease) of knee     Dr Ismael Saravia GERD (gastroesophageal reflux disease)     Hemiparesis, left ([facility]     entered Assisted Living ([facility])    History of heart failure     History of seizures     History of type C viral hepatitis     HTN (hypertension)     Hyperlipidemia     Impaired mobility and activities of daily living     Mediastinal lymphadenopathy     Aiden Barnes    Metabolic syndrome     Neurogenic urinary incontinence 2013    Neuropathy in diabetes ([facility]     Obesity (BMI 30-39. 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 Randal Enciso      x1 Dr. Hurman Harada, Dr Lemuel Santroo CATH LAB PROCEDURE  10/02/2019    HYSTERECTOMY, TOTAL ABDOMINAL      one ovary intact, Dr Markham Score, menorrhagia    MD TOTAL KNEE ARTHROPLASTY Left 2018    LEFT KNEE TOTAL KNEE ARTHROPLASTY, SHAYNA, NERVE BLOCK performed by Bernadine Chao MD at [address] [place] [address] Right     TOTAL KNEE ARTHROPLASTY  16    Dr Ismael Saravia TUNNELED VENOUS CATHETER PLACEMENT Right 07/01/2020    tunneled HD catheter per Dr Rey Ramos       Restrictions  Restrictions/Precautions: Fall Risk    SUBJECTIVE   Subjective  Subjective: Pt stated shes leaving tomorrow. Pre-Session Pain Report     Pain Screening  Patient Currently in Pain: Denies  Pre Treatment Pain Screening  Pain at present: 0  Scale Used: Numeric Score  Intervention List: Patient able to continue with treatment    Post-Session Pain Report            OBJECTIVE        Bed mobility  Rolling to Left: Modified independent  Supine to Sit: Modified independent  Sit to Supine: Modified independent    Transfers  Sit to Stand: Supervision  Stand to sit: Supervision    Ambulation  Ambulation?: Yes  Ambulation 1  Surface: level tile  Device: Rolling Walker  Assistance: Stand by assistance;Contact guard assistance  Quality of Gait: slow pace  Gait Deviations: Slow Tere  Distance: 100 ft                    Exercises  Hip Flexion: x10  Hip Abduction: x10  Ankle Pumps: x10                               ASSESSMENT   Assessment: Pt able to complpte ex's standing this date with good ROM and strength noted.  Vc's to stand upright     Discharge Recommendations:  Patient would benefit from continued therapy after discharge    Goals  Long term goals  Long term goal 1: pt to be indep with bed mobility  Long term goal 2: pt to be indep with transfers  Long term goal 3: pt to ambulate >50 ft with supervision with [address] [place] [address]  Long term goal 4: pt to be indep with HEP  Patient Goals   Patient goals : to go home    PLAN    Times per week: 2-4  Safety Devices  Type of devices: Call light within reach, All fall risk precautions in place, Left in bed     AMPAC (6 CLICK) BASIC MOBILITY  AM-PAC Inpatient Mobility Raw Score : 22     Therapy Time   Individual   Time In 1405   Time Out 1422   Minutes 17     Gait: 10min  TherEx: [address] [facility], 07/02/20 at 2:29 PM         Definitions for assistance levels  Independent = pt does not require

## 2020-07-02 NOTE — FLOWSHEET NOTE
Vas cath continues to have a small amt of drainage  with clots, gauze applied to underside of ports to catch the drainage.

## 2020-07-02 NOTE — CARE COORDINATION
PER LSW PT HAS BEEN ACCEPTED TO [facility]. PT IS AWARE. AWAITING COVID TEST AS REQUESTED. PT IS AWARE TO CALL [facility] ONCE DISCHARGED FROM [facility] FOR RIDES TO HEMODIALYSIS, ADVISED TO ALSO CALL [facility]. SECOND IMM REVIEWED AND SIGNED, PT VERBALIZED UNDERSTANDING.

## 2020-07-03 ENCOUNTER — CARE COORDINATION (OUTPATIENT)
Dept: CASE MANAGEMENT | Age: 60
End: 2020-07-03

## 2020-07-03 ENCOUNTER — OFFICE VISIT (OUTPATIENT)
Dept: GERIATRIC MEDICINE | Age: 60
End: 2020-07-03
Payer: MEDICARE

## 2020-07-03 PROCEDURE — 99309 SBSQ NF CARE MODERATE MDM 30: CPT | Performed by: INTERNAL MEDICINE

## 2020-07-03 PROCEDURE — 3044F HG A1C LEVEL LT 7.0%: CPT | Performed by: INTERNAL MEDICINE

## 2020-07-06 LAB
ANION GAP SERPL CALCULATED.3IONS-SCNC: 13 MEQ/L (ref 9–15)
BUN BLDV-MCNC: 41 MG/DL (ref 8–23)
CALCIUM SERPL-MCNC: 9.7 MG/DL (ref 8.5–9.9)
CHLORIDE BLD-SCNC: 100 MEQ/L (ref 95–107)
CO2: 21 MEQ/L (ref 20–31)
CREAT SERPL-MCNC: 3.16 MG/DL (ref 0.5–0.9)
GFR AFRICAN AMERICAN: 18
GFR NON-AFRICAN AMERICAN: 14.9
GLUCOSE BLD-MCNC: 152 MG/DL (ref 70–99)
HCT VFR BLD CALC: 23.5 % (ref 37–47)
HEMOGLOBIN: 8.1 G/DL (ref 12–16)
MCH RBC QN AUTO: 28 PG (ref 27–31.3)
MCHC RBC AUTO-ENTMCNC: 34.6 % (ref 33–37)
MCV RBC AUTO: 80.9 FL (ref 82–100)
PDW BLD-RTO: 14 % (ref 11.5–14.5)
PLATELET # BLD: 128 K/UL (ref 130–400)
POTASSIUM SERPL-SCNC: 4.4 MEQ/L (ref 3.4–4.9)
RBC # BLD: 2.9 M/UL (ref 4.2–5.4)
SODIUM BLD-SCNC: 134 MEQ/L (ref 135–144)
WBC # BLD: 6.6 K/UL (ref 4.8–10.8)

## 2020-07-07 ENCOUNTER — CARE COORDINATION (OUTPATIENT)
Dept: CASE MANAGEMENT | Age: 60
End: 2020-07-07

## 2020-07-07 ENCOUNTER — OFFICE VISIT (OUTPATIENT)
Dept: GERIATRIC MEDICINE | Age: 60
End: 2020-07-07
Payer: MEDICARE

## 2020-07-07 PROCEDURE — 3044F HG A1C LEVEL LT 7.0%: CPT | Performed by: NURSE PRACTITIONER

## 2020-07-07 PROCEDURE — 99309 SBSQ NF CARE MODERATE MDM 30: CPT | Performed by: NURSE PRACTITIONER

## 2020-07-07 NOTE — CARE COORDINATION
Contacted [address] and left voicemail regarding Dietitian follow up. Left call back number and will follow up as appropriate.      [address] [facility], [address] [place] [address]

## 2020-07-08 ENCOUNTER — CARE COORDINATION (OUTPATIENT)
Dept: CASE MANAGEMENT | Age: 60
End: 2020-07-08

## 2020-07-10 ENCOUNTER — CARE COORDINATION (OUTPATIENT)
Dept: CASE MANAGEMENT | Age: 60
End: 2020-07-10

## 2020-07-10 NOTE — CARE COORDINATION
Contacted [address] and left voicemail regarding Dietitian follow up. Left call back number. RD spoke with patient for initial nutrition assessment on 6/16. RD outreached 7/7, 7/8 and 7/10 for follow up- unable to reach patient and left VM each outreach. RD will continue to follow/assist with patient return call.        [address]

## 2020-07-17 ENCOUNTER — OFFICE VISIT (OUTPATIENT)
Dept: GERIATRIC MEDICINE | Age: 60
End: 2020-07-17
Payer: MEDICARE

## 2020-07-17 PROCEDURE — 99308 SBSQ NF CARE LOW MDM 20: CPT | Performed by: NURSE PRACTITIONER

## 2020-07-21 ENCOUNTER — OFFICE VISIT (OUTPATIENT)
Dept: GERIATRIC MEDICINE | Age: 60
End: 2020-07-21
Payer: MEDICARE

## 2020-07-21 PROCEDURE — 3044F HG A1C LEVEL LT 7.0%: CPT | Performed by: NURSE PRACTITIONER

## 2020-07-21 PROCEDURE — 99310 SBSQ NF CARE HIGH MDM 45: CPT | Performed by: NURSE PRACTITIONER

## 2020-07-27 ENCOUNTER — CARE COORDINATION (OUTPATIENT)
Dept: CASE MANAGEMENT | Age: 60
End: 2020-07-27

## 2020-07-27 ENCOUNTER — OFFICE VISIT (OUTPATIENT)
Dept: CARDIOLOGY CLINIC | Age: 60
End: 2020-07-27
Payer: MEDICARE

## 2020-07-27 VITALS
TEMPERATURE: 97.9 F | HEIGHT: 67 IN | SYSTOLIC BLOOD PRESSURE: 136 MMHG | BODY MASS INDEX: 36.6 KG/M2 | RESPIRATION RATE: 18 BRPM | OXYGEN SATURATION: 95 % | HEART RATE: 84 BPM | DIASTOLIC BLOOD PRESSURE: 74 MMHG | WEIGHT: 233.2 LBS

## 2020-07-27 VITALS
SYSTOLIC BLOOD PRESSURE: 130 MMHG | OXYGEN SATURATION: 97 % | RESPIRATION RATE: 18 BRPM | BODY MASS INDEX: 34.77 KG/M2 | HEART RATE: 84 BPM | DIASTOLIC BLOOD PRESSURE: 62 MMHG | WEIGHT: 222 LBS

## 2020-07-27 PROBLEM — N18.6 ESRD (END STAGE RENAL DISEASE) ON DIALYSIS (HCC): Status: ACTIVE | Noted: 2020-07-27

## 2020-07-27 PROBLEM — R26.2 DIFFICULTY IN WALKING: Status: ACTIVE | Noted: 2020-07-27

## 2020-07-27 PROBLEM — E66.812 CLASS 2 SEVERE OBESITY WITH SERIOUS COMORBIDITY AND BODY MASS INDEX (BMI) OF 36.0 TO 36.9 IN ADULT (HCC): Status: ACTIVE | Noted: 2019-12-03

## 2020-07-27 PROBLEM — Z99.2 ESRD (END STAGE RENAL DISEASE) ON DIALYSIS (HCC): Status: ACTIVE | Noted: 2020-07-27

## 2020-07-27 PROCEDURE — 3017F COLORECTAL CA SCREEN DOC REV: CPT | Performed by: INTERNAL MEDICINE

## 2020-07-27 PROCEDURE — G8417 CALC BMI ABV UP PARAM F/U: HCPCS | Performed by: INTERNAL MEDICINE

## 2020-07-27 PROCEDURE — 1036F TOBACCO NON-USER: CPT | Performed by: INTERNAL MEDICINE

## 2020-07-27 PROCEDURE — 99213 OFFICE O/P EST LOW 20 MIN: CPT | Performed by: INTERNAL MEDICINE

## 2020-07-27 PROCEDURE — 1111F DSCHRG MED/CURRENT MED MERGE: CPT | Performed by: INTERNAL MEDICINE

## 2020-07-27 PROCEDURE — G8427 DOCREV CUR MEDS BY ELIG CLIN: HCPCS | Performed by: INTERNAL MEDICINE

## 2020-07-27 PROCEDURE — 93000 ELECTROCARDIOGRAM COMPLETE: CPT | Performed by: INTERNAL MEDICINE

## 2020-07-27 RX ORDER — ISOSORBIDE MONONITRATE 60 MG/1
60 TABLET, EXTENDED RELEASE ORAL DAILY
Qty: 90 TABLET | Refills: 3 | Status: SHIPPED | OUTPATIENT
Start: 2020-07-27 | End: 2021-10-22 | Stop reason: DRUGHIGH

## 2020-07-27 NOTE — PROGRESS NOTES
Name: [facility] [address] [facility]: [facility]  Ailyn Clark  Date: 7/21/2020     Subjective:     Chief Complaint   Patient presents with    Follow-up     evaluation prior to discharge, evaluation of chronic conditions    Blood Sugar Problem     [facility] with skilled nursing    Congestive Heart Failure       HPI  Adri Saenz is a 56 y.o. female being seen today for evaluation prior to discharge this Friday. Resident was admitted to [facility] for skilled nursing care and rehab secondary to debility and deconditioning. She was treated in the hospital for acute kidney injury on CKD stage IV, acute diastolic heart failure, CAD, and uncontrolled type 2 diabetes. She presented there with shortness of breath and fluid overload. Dialysis was initiated via dialysis catheter that was placed in the right subclavian. She has been receiving dialysis every Monday Wednesday and Friday since admission. She has complex medical history that includes coronary artery disease with stent placement in the left coronary artery history of seizures hyperlipidemia hypertension anxiety depression metabolic syndrome viral hepatitis C. Resident has been participating in physical therapy and has made progress. Physical therapy staff report that she is transferring with modified independence bed mobility is modified independence she does ambulate with a front wheel walker with modified independence she is able to ambulate without assistive devices with standby assist for about 200 feet or more. She has taken 12 steps with standby assistance with occasional support. She does require moderate independence for ADLs, family helps with IADLs. One of the barriers to therapy is poor self pacing, patient does not like to take breaks therefore she will need to continue PT OT at discharge for safety, balance, endurance, and strengthening.   Upon evaluation today she is sitting up in recliner chair, in no acute distress, wants to continue therapy in the home environment. She denies complaints of chest pain, chest palpitations, irregular heartbeat, shortness of breath, nausea, vomiting, diarrhea, constipation, lightheadedness, or dizziness. Her main concern is getting stronger so she is still has generalized muscle weakness and difficulty with endurance and fatigue. Vital signs are stable, no fever. She has family support and states she is ready for discharge this week. Her weight has been fluctuating between 227 pounds on admission to 233 pounds today. Peripheral edema is much improved. Requiring any oxygen, pulse ox greater than 92% at all times on room air. Her blood sugars have running between 100-395, she takes Lantus and NovoLog sliding scale. Diet instruction given. Dietitian has given diet instruction and education around weight loss. She has had some weight loss over the past couple of months but needs to continue to try to lose more weight. Clinically she is stable for discharge. Past Medical History:   Diagnosis Date    Anxiety     CAD S/P percutaneous coronary angioplasty 2015, 2018    stents per dr Jaison Mcleod CKD stage 4 due to type 2 diabetes mellitus ([facility]     Diabetic nephropathy with proteinuria ([facility] 2014    DJD (degenerative joint disease) of knee     Dr Shelly Darling GERD (gastroesophageal reflux disease)     Hemiparesis, left ([facility] 2013    entered Ass[facility] Living ([facility])    History of heart failure     History of seizures     History of type C viral hepatitis     HTN (hypertension)     Hyperlipidemia     Impaired mobility and activities of daily living     Mediastinal lymphadenopathy 2013    Dom Erwin    Metabolic syndrome     Neurogenic urinary incontinence 2013    Neuropathy in diabetes ([facility]     Obesity (BMI 30-39. 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 Objective:   /74   Pulse 84   Temp 97.9 °F (36.6 °C)   Resp 18   Ht 5' 7\" (1.702 m)   Wt 233 lb 3.2 oz (105.8 kg)   LMP  (LMP Unknown)   SpO2 95%   BMI 36.52 kg/m²     Physical Exam Constitutional:  up in recliner chair, well-developed, in no acute distess  Pulmonary/Chest:  breathing unlabored, chest excursion symmetrical, no use of accessory muscles, lung sounds clear, no shortness of breath, no dyspnea  Cardiovascular:  S1-S2 regular, no murmur, 2+ DP x 2, mild edema  Abd/GI:  abdomen soft, round, non-distended, non-tender, no masses, active bowel sounds x 4 quadrants  MS/Extremities:  HIGH, ROM limited, abnormal gait, no clubbing, no cyanosis  Psych:  A/O x 3, cooperative with care, mild anxiety, appropriate mood and affect,   Skin:  warm and dry, color normal, no lesions, no rashes     Diagnosis Orders   1. ESRD (end stage renal disease) on dialysis ([address]     2. Diastolic congestive heart failure, unspecified HF chronicity ([address]     3. Uncontrolled type 2 diabetes mellitus with hyperglycemia ([address]     4. Difficulty in walking     5. Class 2 severe obesity with serious comorbidity and body mass index (BMI) of 36.0 to 36.9 in adult, unspecified obesity type ([address]     6.  Coronary artery disease involving native coronary artery of native heart with angina pectoris ([address]         Assessment and activities and diet. Side effects, adverse effects of the medication prescribed, as well as treatment plan and result expectations. Discussed diagnostic results, other suggested diagnostic testing, prognosis, risk and benefits of treatment options, importance of compliance with treatment options, risk factor reduction, and family education. Discussed discharge plans with PT/OT, dietitian, and nursing. I have reviewed the patient's medical history in detail and updated the computerized patient record. This note was partially generated using Dragon voice recognition system, and there may be some incorrect words, spellings, punctuation that were not noticed in checking the note before saving.     Mao Yap, APRN-CNP
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
## 2020-07-27 NOTE — CARE COORDINATION
[facility] Transitions Initial Follow Up Call    Call within 2 business days of discharge: Yes    Patient: Sheldon Melo Patient : 1960   MRN: <[id number]>  Reason for Admission: HF and ARF  Discharge Date: 20 RARS: Readmission Risk Score: 40      Last Discharge [facility]       Complaint Diagnosis Description Type Department Provider    20 Leg Swelling; Shortness of Breath Acute on chronic combined systolic and diastolic congestive heart failure ([address] . .. ED to Hosp-Admission (Discharged) (ADMITTED) Gerry Gilford, MD; Aaron Pedroza, ... Spoke with [facility] after 2 pt identifiers. Explained purpose of call and she said she was doing fine and as I was asking her about [facility] she hung up on me. Ending episode as declined services.       Isabel Brown, [phone]  Spoke with: [facility]: [address] services provided:      Care Transitions 24 Hour Call    Were you discharged with any Home Care or Post Acute Services:  Yes  Post Acute Services:   (Comment: [address])  Patient DME:  [facility] [place] chair  Do you have support at home?:  Child, Grandchild  Are you an active caregiver in your home?:  No  Care Transitions Interventions     DME Assistance:  Completed          Follow Up  Future Appointments   Date Time Provider Aminata Cortes   8/10/2020 10:30 AM [facility]   10/28/2020 10:30 AM Roberto Ho MD [facility]       Isabel Brown, [address] [place] [address]

## 2020-07-30 NOTE — PROGRESS NOTES
PATIENT: Miriam Wynn : 1960 DOS: 2020     Seen for admission history and physical.    CHIEF COMPLAINT:  Dialysis access impairment, bleeding diathesis, end-stage renal disease. HISTORY OF PRESENT ILLNESS:  Patient is seen in her room today. Torers, interactive. Recently hospitalized for placement of her dialysis access which apparently had complications. Patient had significant bleeding, require compression. Patient had been stabilized, maintained on dialysis. Patient's blood sugars were monitored closely given her ongoing diabetes, somewhat brittle in nature. Patient is transferred here for ongoing course of care and continued hemodialysis. Is globally weak at a time. Torres, interactive at this time. Cognitively intact. Patient is pain-free. She does have some discomfort at her dialysis access port. Otherwise, she is without issue. PAST MEDICAL HISTORY:  Includes end-stage renal disease, on hemodialysis; diabetes, hypertension, degenerative joint disease, obesity, constipation, urinary incontinence, schizophrenia, amputation of the 3rd toe of the right foot, status post bilateral knee replacements, cerebrovascular disease. MEDICATIONS:  On arrival included the following:  She is on vitamin D, vitamin B, Tylenol, Zoloft 50 mg daily. She is on Ranexa 500 mg twice daily, Protonix 40 mg daily, pantoprazole 20 mg daily, sliding scale insulin, Norvasc 10 mg daily,Imdur, Lantus SoloSTAR 350 units subcu daily, DuoNeb, Coreg 3.125 mg twice daily, Brilinta  twice daily, aspirin 81 mg daily, Abilify 5 mg twice daily. FAMILY HISTORY:  Includes diabetes, coronary artery disease. SOCIAL HISTORY:  The patient currently is a nonsmoker, nondrinker. REVIEW OF SYSTEMS:  The patient denies nausea, vomiting, emesis. Has discomfort in her chest port site. No acute psychosis, bleeding diathesis. Rest of 14-point review of systems is unremarkable.     PHYSICAL EXAMINATION:  The

## 2020-08-02 NOTE — PROGRESS NOTES
Patient Name: Cathie Valencia  YOB: 1960  Medical Record Number: [id number]              History of Present Illness:      Review of Systems    Review of Systems: All 14 review of systems negative other than as stated above    Social History     Tobacco Use    Smoking status: Passive Smoke Exposure - Never Smoker    Smokeless tobacco: Never Used   Substance Use Topics    Alcohol use: No     Alcohol/week: 0.0 standard drinks    Drug use: No         Past Medical History:   Diagnosis Date    Anxiety     CAD S/P percutaneous coronary angioplasty ,     stents per dr Iglesia Clarke CKD stage 4 due to type 2 diabetes mellitus ([facility]     Diabetic nephropathy with proteinuria ([address]     DJD (degenerative joint disease) of knee     Dr Lisa Singer GERD (gastroesophageal reflux disease)     Hemiparesis, left ([facility] 2013    [facility] Living ([facility]    History of heart failure     History of seizures     History of type C viral hepatitis     HTN (hypertension)     Hyperlipidemia     Impaired mobility and activities of daily living     Mediastinal lymphadenopathy 2013    [facility]    Metabolic syndrome     Neurogenic urinary incontinence 2013    Neuropathy in diabetes ([address]     Obesity (BMI 30-39. 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 Jeanie [facility]      x1 Dr. Tea Bardales, Dr Jaycee Arechiga 2018    DIAGNOSTIC CARDIAC CATH LAB PROCEDURE  10/02/2019    HYSTERECTOMY, TOTAL ABDOMINAL      one ovary intact, Dr Carolina Lay, menorrhagia    SC TOTAL KNEE ARTHROPLASTY Left 6/21/2018    LEFT KNEE TOTAL KNEE ARTHROPLASTY, SHAYNA, NERVE BLOCK performed by Omaira Gonzales MD at [address] [place] [address] Right     TOTAL KNEE ARTHROPLASTY  05/19/16    Dr Wade Adame [address]ELED [address] Right 07/01/2020    tunneled HD catheter per Dr Harpal Linder Medications on File Prior to Visit   Medication Sig Dispense Refill    ipratropium-albuterol (DUONEB) 0.5-2.5 (3) MG/3ML SOLN nebulizer solution Inhale 3 mLs into the lungs every 4 hours as needed for Shortness of Breath 360 mL 0    carvedilol (COREG) 3.125 MG tablet Take 1 tablet by mouth 2 times daily 60 tablet 3    sertraline (ZOLOFT) 50 MG tablet TAKE 1 TABLET BY MOUTH DAILY 90 tablet 0    nystatin (NYAMYC) 633299 UNIT/GM powder APPLY TO ABDOMINAL FOLDS EVERY 12 HOURS AS NEEDED 60 g 3    insulin aspart (NOVOLOG FLEXPEN) 100 UNIT/ML injection pen Inject into the skin 3 times daily (before meals) 12 units TID before meals. Also use sliding scale- 1 unit for every 50 over 150.       insulin glargine (LANTUS SOLOSTAR) 100 UNIT/ML injection pen Inject into the skin nightly Inject 35 units once nightly      Blood Glucose Monitoring Suppl (FREESTYLE LITE) CORETTA 1 Device by Does not apply route daily as needed Use freestyle meter to test blood sugar as needed      ranolazine (RANEXA) 500 MG extended release tablet Take 1 tablet by mouth 2 times daily 60 tablet 3    ARIPiprazole (ABILIFY) 5 MG tablet Take 1 tablet by mouth daily 30 tablet 2    pantoprazole (PROTONIX) 20 MG tablet TAKE 1 TABLET BY MOUTH DAILY 90 tablet 1    amLODIPine (NORVASC) 10 MG tablet TAKE 1 TABLET BY MOUTH DAILY 90 tablet 1    aspirin 81 MG tablet Take 1 tablet by mouth daily 90 tablet 3    atorvastatin (LIPITOR) 40 MG tablet Take 1 tablet by mouth daily 90 tablet 3    SURE COMFORT PEN NEEDLES 30G X 8 MM MISC USE AS DIRECTED FIVE TIMES A  each 10    melatonin 3 MG TABS tablet TAKE 1 TABLET BY MOUTH EVERY NIGHT AS NEEDED FOR INSOMNIA 180 tablet 4    nitroGLYCERIN (NITROSTAT) 0.4 MG SL tablet Place 1 tablet under the tongue every 5 minutes as needed for Chest pain      magnesium oxide (MAG-OX) 400 MG tablet Take 400 mg by mouth daily      vitamin B-12 (CYANOCOBALAMIN) 100 MCG tablet TAKE 1 TABLET BY MOUTH DAILY 30 tablet 11    D3-1000 1000 units CAPS TAKE 1 CAPSULE BY MOUTH ONCE DAILY 90 capsule 2    pregabalin (LYRICA) 75 MG capsule Give one capsule po bid 60 capsule 1     No current facility-administered medications on file prior to visit. Allergies   Allergen Reactions    Codeine Hives     hives    Oxycontin [Oxycodone Hcl] Hives         Family History   Problem Relation Age of Onset    Cancer Mother 76        survived   [facility] Hypertension Father     Diabetes Sister     Mental Illness Sister          Physical Exam:      Physical Exam    not currently breastfeeding.       .   Lab Results   Component Value Date    WBC 6.6 07/06/2020    HGB 8.1 (L) 07/06/2020    HCT 23.5 (L) 07/06/2020    MCV 80.9 (L) 07/06/2020     (L) 07/06/2020     Lab Results   Component Value Date     07/06/2020    K 4.4 07/06/2020    K 4.0 06/15/2020     07/06/2020    CO2 21 07/06/2020    BUN 41 07/06/2020    CREATININE 3.16 07/06/2020    GLUCOSE 152 07/06/2020    CALCIUM 9.7 07/06/2020                ASSESSMENT:  Patient Active Problem List   Diagnosis    Coronary artery disease involving native heart with angina pectoris (HCC)    Schizophrenia, paranoid, chronic    Metabolic syndrome    Vitamin B 12 deficiency    Cerebral microvascular disease    Mixed hyperlipidemia    Other hammer toe (acquired)    Vitamin D insufficiency    Incontinence of urine   

## 2020-08-03 ENCOUNTER — APPOINTMENT (OUTPATIENT)
Dept: CT IMAGING | Age: 60
End: 2020-08-03
Payer: MEDICARE

## 2020-08-03 ENCOUNTER — HOSPITAL ENCOUNTER (EMERGENCY)
Age: 60
Discharge: HOME | End: 2020-08-03
Attending: EMERGENCY MEDICINE
Payer: MEDICARE

## 2020-08-03 VITALS
OXYGEN SATURATION: 99 % | RESPIRATION RATE: 18 BRPM | HEIGHT: 67 IN | TEMPERATURE: 98.7 F | WEIGHT: 219 LBS | BODY MASS INDEX: 34.37 KG/M2 | HEART RATE: 74 BPM | DIASTOLIC BLOOD PRESSURE: 56 MMHG | SYSTOLIC BLOOD PRESSURE: 126 MMHG

## 2020-08-03 PROCEDURE — 6370000000 HC RX 637 (ALT 250 FOR IP): Performed by: EMERGENCY MEDICINE

## 2020-08-03 PROCEDURE — 70450 CT HEAD/BRAIN W/O DYE: CPT

## 2020-08-03 PROCEDURE — 6360000002 HC RX W HCPCS: Performed by: EMERGENCY MEDICINE

## 2020-08-03 PROCEDURE — 90471 IMMUNIZATION ADMIN: CPT | Performed by: EMERGENCY MEDICINE

## 2020-08-03 PROCEDURE — 99284 EMERGENCY DEPT VISIT MOD MDM: CPT

## 2020-08-03 PROCEDURE — 12004 RPR S/N/AX/GEN/TRK7.6-12.5CM: CPT

## 2020-08-03 PROCEDURE — 90715 TDAP VACCINE 7 YRS/> IM: CPT | Performed by: EMERGENCY MEDICINE

## 2020-08-03 PROCEDURE — 2500000003 HC RX 250 WO HCPCS: Performed by: EMERGENCY MEDICINE

## 2020-08-03 RX ORDER — TRAMADOL HYDROCHLORIDE 50 MG/1
50 TABLET ORAL ONCE
Status: COMPLETED | OUTPATIENT
Start: 2020-08-03 | End: 2020-08-03

## 2020-08-03 RX ORDER — LIDOCAINE HYDROCHLORIDE AND EPINEPHRINE BITARTRATE 20; .01 MG/ML; MG/ML
20 INJECTION, SOLUTION SUBCUTANEOUS ONCE
Status: COMPLETED | OUTPATIENT
Start: 2020-08-03 | End: 2020-08-03

## 2020-08-03 RX ADMIN — TETANUS TOXOID, REDUCED DIPHTHERIA TOXOID AND ACELLULAR PERTUSSIS VACCINE, ADSORBED 0.5 ML: 5; 2.5; 8; 8; 2.5 SUSPENSION INTRAMUSCULAR at 16:49

## 2020-08-03 RX ADMIN — TRAMADOL HYDROCHLORIDE 50 MG: 50 TABLET, FILM COATED ORAL at 16:49

## 2020-08-03 RX ADMIN — LIDOCAINE HYDROCHLORIDE,EPINEPHRINE BITARTRATE 20 ML: 20; .01 INJECTION, SOLUTION INFILTRATION; PERINEURAL at 16:52

## 2020-08-03 ASSESSMENT — PAIN SCALES - GENERAL
PAINLEVEL_OUTOF10: 8
PAINLEVEL_OUTOF10: 6

## 2020-08-03 NOTE — ED PROVIDER NOTES
HPI:  8/3/20, Time: 4:11 PM EDT         Jerri Arana is a 56 y.o. female presenting to the ED for head injury, beginning 1 hour ago. The complaint has been persistent, moderate in severity, and worsened by nothing. Tetanus is not up to date. She fell striking her head on the wall causing a laceration. She takes Brilinta as a blood thinner. She has a mild headache gradual in onset it is not the worst headache of her life. No neck pain no back pain she denies any other injuries bleeding is controlled upon arrival    ROS:   Pertinent positives and negatives are stated within HPI, all other systems reviewed and are negative.  --------------------------------------------- PAST HISTORY ---------------------------------------------  Past Medical History:  has a past medical history of Anxiety, CAD S/P percutaneous coronary angioplasty, CKD stage 4 due to type 2 diabetes mellitus ([address] Diabetic nephropathy with proteinuria ([address] DJD (degenerative joint disease) of knee, GERD (gastroesophageal reflux disease), Hemiparesis, left ([address] History of heart failure, History of seizures, History of type C viral hepatitis, HTN (hypertension), Hyperlipidemia, Impaired mobility and activities of daily living, Mediastinal lymphadenopathy, Metabolic syndrome, Neurogenic urinary incontinence, Neuropathy in diabetes ([address] Obesity (BMI 30-39.9), Recurrent UTI, S/P colonoscopy, Schizophrenia, paranoid, chronic ([address] Small vessel disease, cerebrovascular, Status post total knee replacement, right, Status post total left knee replacement, 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 Past Surgical History:  has a past surgical history that includes Hysterectomy, total abdominal; Toe amputation (Right); Coronary angioplasty with stent; Colonoscopy (2014);   section; Total knee arthroplasty (05/19/16); pr total knee arthroplasty (Left, 6/21/2018); Diagnostic Cardiac Cath Lab Procedure (10/02/2019); and Tunneled venous catheter placement (Right, 07/01/2020). Social History:  reports that she is a non-smoker but has been exposed to tobacco smoke. She has never used smokeless tobacco. She reports that she does not drink alcohol or use drugs. Family History: family history includes Cancer (age of onset: 76) in her mother; Diabetes in her sister; Hypertension in her father; Mental Illness in her sister. The patients home medications have been reviewed. Allergies: Codeine and Oxycontin [oxycodone hcl]    ---------------------------------------------------PHYSICAL EXAM--------------------------------------     Constitutional/General: Alert and oriented x3, well appearing, non toxic in NAD  Head: Normocephalic and a 10 cm superficial linear laceration in the center of the scalp no foreign bodies noted  Eyes: PERRL, EOMI  Mouth: Oropharynx clear, handling secretions, no trismus  Neck: Supple, full ROM, non tender to palpation in the midline, no stridor, no crepitus, no meningeal signs  Pulmonary: Lungs clear to auscultation bilaterally, no wheezes, rales, or rhonchi. Not in respiratory distress  Cardiovascular:  Regular rate. Regular rhythm. No murmurs, gallops, or rubs. 2+ distal pulses  Chest: no chest wall tenderness  Abdomen: Soft. Non tender. Non distended. +BS. No rebound, guarding, or rigidity. No pulsatile masses appreciated. Musculoskeletal: Moves all extremities x 4. Warm and well perfused, no clubbing, cyanosis, or edema. Capillary refill <3 seconds  Skin: warm and dry. No rashes.    Neurologic: GCS 15, CN 2-12 grossly intact, no focal deficits, symmetric strength 5/5 in the upper and lower extremities bilaterally  Psych: Normal Affect    -------------------------------------------------- RESULTS -------------------------------------------------  I staples: 10      Re-Evaluations:             Re-evaluation. Patients symptoms are improving      Consultations: This patient's ED course included: re-evaluation prior to disposition and a personal history and physicial eaxmination    This patient has remained hemodynamically stable and improved during their ED course. Counseling: The emergency provider has spoken with the patient and discussed todays results, in addition to providing specific details for the plan of care and counseling regarding the diagnosis and prognosis. Questions are answered at this time and they are agreeable with the plan.       --------------------------------- IMPRESSION AND DISPOSITION ---------------------------------    IMPRESSION  1. Injury of head, initial encounter    2. Laceration of scalp, initial encounter        DISPOSITION  Disposition: Discharge to home  Patient condition is good        NOTE: This report was transcribed using voice recognition software.  Every effort was made to ensure accuracy; however, inadvertent computerized transcription errors may be present          Patsy Delgado MD  08/03/20 0452

## 2020-08-05 ENCOUNTER — TELEPHONE (OUTPATIENT)
Dept: FAMILY MEDICINE CLINIC | Age: 60
End: 2020-08-05

## 2020-08-05 VITALS
RESPIRATION RATE: 18 BRPM | SYSTOLIC BLOOD PRESSURE: 124 MMHG | OXYGEN SATURATION: 99 % | TEMPERATURE: 98 F | HEART RATE: 74 BPM | DIASTOLIC BLOOD PRESSURE: 80 MMHG

## 2020-08-05 PROBLEM — R53.1 WEAKNESS: Status: ACTIVE | Noted: 2020-08-05

## 2020-08-05 NOTE — TELEPHONE ENCOUNTER
Care manager called to inform the patient that she was seen in the ED 08/03/2020 for a fall but back at home.

## 2020-08-05 NOTE — PROGRESS NOTES
Name: [facility] [address]: [facility]  [address]  Ailyn José  Date: 7/7/2020     Subjective:     Chief Complaint   Patient presents with    Follow-up       HPI  Mayo Rondon is a 56 y.o. female being seen today for evaluation of acute rehab progress and management of chronic conditions. Resident admitted to [facility] for acute rehab secondary to debility and deconditioning. She does have a history of diastolic heart failure chronic kidney on hemodialysis disease diabetes type 2 schizophrenia neuropathy hypertension and hyperlipidemia. She is participating in physical therapy and making progress. Resident is able to ambulate without assistive devices but does use front wheel walker. She has a right subclavian dialysis catheter that they are using for her hemodialysis 3 times a week. She does experience some shortness of breath with exertion in therapy that resolves with rest.  Blood sugars are inconsistent and range between 100-300 she is currently on Lantus and sliding scale insulin coverage. Labs done on 7/6/2020 revealed anemia and chronic kidney disease; H&H 8.1/23.5 BUN is 41 creatinine 3.16. She is on Abilify for her schizophrenia which seems to manage her behaviors. She denies complaints of any pain or discomfort at this time says pain is 0 out of 10 on a scale of 10. Resident also denies complaints of chest pain chest palpitations nausea vomiting diarrhea constipation lightheadedness or dizziness. Is ready to discharge home stating that her daughter helps with her ADLs while she is home and will have plenty of support post discharge. Vital signs are stable no fever continue PT OT as ordered. Continue medications as ordered.     Past Medical History:   Diagnosis Date    Anxiety     CAD S/P percutaneous coronary angioplasty 2015, 2018    stents per dr Emilia Monte CKD stage 4 due to type 2 diabetes mellitus [facility]     Diabetic nephropathy with proteinuria ([facility] 2014    DJD (degenerative joint disease) of knee     Dr Ruma Hernandez GERD (gastroesophageal reflux disease)     Hemiparesis, left ([facility] 2013    [facility] Living ([facility])    History of heart failure     History of seizures     History of type C viral hepatitis     HTN (hypertension)     Hyperlipidemia     Impaired mobility and activities of daily living     Mediastinal lymphadenopathy 2013    Dr Chon Rosa, [facility]    Metabolic syndrome     Neurogenic urinary incontinence 2013    Neuropathy in diabetes ([facility]     Obesity (BMI 30-39. 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         Objective:   /80   Pulse 74   Temp 98 °F (36.7 °C)   Resp 18   LMP  (LMP Unknown)   SpO2 99%     Physical Exam Constitutional:  up in wheelchair, well-developed, in no acute distess  Pulmonary/Chest:  breathing unlabored, chest excursion symmetrical, no use of accessory muscles, lung sounds clear, no shortness of breath, no dyspnea  Cardiovascular:  S1-S2 regular, no murmur, 2+ DP x 2, edema  Abd/GI:  abdomen soft, round, non-distended, non-tender, no masses, active bowel sounds x 4 quadrants  MS/Extremities:  HIGH, ROM limited, abnormal gait, no clubbing, no cyanosis  Psych:  A/O x 3, cooperative with care, no

## 2020-08-06 ENCOUNTER — TELEPHONE (OUTPATIENT)
Dept: CARDIOLOGY CLINIC | Age: 60
End: 2020-08-06

## 2020-08-06 NOTE — TELEPHONE ENCOUNTER
Tam Spaulding from [place] [address] is calling. She is wanting to know if we can change the directions on the ranexa? It is prescribed as 500mg BID, but when she takes it on dialysis days it is dialiized out. Can they change it to once daily dosing after dialysis on Tuesday Thursday and Saturday?

## 2020-08-10 ENCOUNTER — VIRTUAL VISIT (OUTPATIENT)
Dept: FAMILY MEDICINE CLINIC | Age: 60
End: 2020-08-10
Payer: MEDICARE

## 2020-08-10 ENCOUNTER — OFFICE VISIT (OUTPATIENT)
Dept: FAMILY MEDICINE CLINIC | Age: 60
End: 2020-08-10
Payer: MEDICARE

## 2020-08-10 VITALS
OXYGEN SATURATION: 98 % | DIASTOLIC BLOOD PRESSURE: 82 MMHG | SYSTOLIC BLOOD PRESSURE: 126 MMHG | RESPIRATION RATE: 15 BRPM | HEART RATE: 93 BPM | TEMPERATURE: 98 F

## 2020-08-10 PROBLEM — R79.89 ELEVATED PROLACTIN LEVEL: Status: ACTIVE | Noted: 2020-08-10

## 2020-08-10 PROBLEM — T40.601A OPIATE OVERDOSE (HCC): Status: ACTIVE | Noted: 2020-08-10

## 2020-08-10 PROBLEM — S98.131A: Status: ACTIVE | Noted: 2020-08-10

## 2020-08-10 PROBLEM — G40.909 SEIZURE DISORDER (HCC): Status: ACTIVE | Noted: 2020-08-10

## 2020-08-10 PROCEDURE — 99212 OFFICE O/P EST SF 10 MIN: CPT | Performed by: PHYSICIAN ASSISTANT

## 2020-08-10 PROCEDURE — 99214 OFFICE O/P EST MOD 30 MIN: CPT | Performed by: FAMILY MEDICINE

## 2020-08-10 PROCEDURE — 3017F COLORECTAL CA SCREEN DOC REV: CPT | Performed by: FAMILY MEDICINE

## 2020-08-10 PROCEDURE — G8427 DOCREV CUR MEDS BY ELIG CLIN: HCPCS | Performed by: FAMILY MEDICINE

## 2020-08-10 RX ORDER — ASPIRIN 81 MG/1
TABLET, COATED ORAL
COMMUNITY
Start: 2020-07-14 | End: 2020-09-23 | Stop reason: SDUPTHER

## 2020-08-10 RX ORDER — MAGNESIUM HYDROXIDE/ALUMINUM HYDROXICE/SIMETHICONE 120; 1200; 1200 MG/30ML; MG/30ML; MG/30ML
SUSPENSION ORAL
COMMUNITY
End: 2020-09-23

## 2020-08-10 RX ORDER — SULFAMETHOXAZOLE AND TRIMETHOPRIM 400; 80 MG/1; MG/1
1 TABLET ORAL 2 TIMES DAILY
Qty: 10 TABLET | Refills: 0 | Status: SHIPPED | OUTPATIENT
Start: 2020-08-10 | End: 2020-08-15

## 2020-08-10 ASSESSMENT — PATIENT HEALTH QUESTIONNAIRE - PHQ9
SUM OF ALL RESPONSES TO PHQ QUESTIONS 1-9: 0
1. LITTLE INTEREST OR PLEASURE IN DOING THINGS: 0
SUM OF ALL RESPONSES TO PHQ9 QUESTIONS 1 & 2: 0
SUM OF ALL RESPONSES TO PHQ QUESTIONS 1-9: 0
2. FEELING DOWN, DEPRESSED OR HOPELESS: 0

## 2020-08-10 NOTE — PROGRESS NOTES
Subjective     Michelle Avery 56 y.o. female presents 8/10/20 with   Chief Complaint   Patient presents with    Suture / Staple Removal     Patient present today with needing staples removed from the head. Patient states that they have been in for one week and benito any complication. HPI  Patient is here for staple removal.  She had 10 staples placed on her scalp at [facility] ER on 8/3/20. She denies pain, drainage, swelling or bleeding. Reviewed thefollowing history:    Past Medical History:   Diagnosis Date    Anxiety     CAD S/P percutaneous coronary angioplasty , 2018    stents per dr Shweta Salinas CKD stage 4 due to type 2 diabetes mellitus ([address]     Diabetic nephropathy with proteinuria ([address]     DJD (degenerative joint disease) of knee     Dr Chacha Tijerina GERD (gastroesophageal reflux disease)     Hemiparesis, left ([address] 2013    entered Assisted Living ([facility])    History of heart failure     History of seizures     History of type C viral hepatitis     HTN (hypertension)     Hyperlipidemia     Impaired mobility and activities of daily living     Mediastinal lymphadenopathy 2013    Dr Jannette Gutierrez, [facility] Right    Metabolic syndrome     Neurogenic urinary incontinence 2013    Neuropathy in diabetes ([address]     Obesity (BMI 30-39. 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 Dina Hendrickson      x1 Dr. Diane Hanson, Dr Sabrina Berrios 2018   [address] CATH LAB PROCEDURE  10/02/2019    HYSTERECTOMY, TOTAL ABDOMINAL      one ovary intact, Dr Ana Paula Ramos, menorrhagia    UT TOTAL KNEE ARTHROPLASTY Left 6/21/2018    LEFT KNEE TOTAL KNEE ARTHROPLASTY, SHAYNA, NERVE BLOCK performed by Cricket Sheikh MD at [address] [place] [address] Right     TOTAL KNEE ARTHROPLASTY  05/19/16    Dr Moiz Davis TUNNELED [address] Right 07/01/2020    tunneled HD catheter per Dr Adolfo Echols     Family History   Problem Relation Age of Onset    Cancer Mother 76        survived   Carlos Manuel Wills Hypertension Father     Diabetes Sister     Mental Illness Sister        Allergies   Allergen Reactions    Codeine Hives     hives    Oxycontin [Oxycodone Hcl] Hives       Current Outpatient Medications   Medication Sig Dispense Refill    sulfamethoxazole-trimethoprim (BACTRIM) 400-80 MG per tablet Take 1 tablet by mouth 2 times daily for 5 days 10 tablet 0    aluminum & magnesium hydroxide-simethicone (ALMACONE) 200-200-20 MG/5ML SUSP suspension Take 30 mLs by mouth every 4 hours as needed for Indigestion      ASPIRIN LOW DOSE 81 MG EC tablet       Magnesium Oxide -Mg Supplement 400 MG CAPS       triamcinolone (KENALOG) 0.1 % cream APPLY TO AFFECTED AREA TWICE DAILY 80 g 1    ticagrelor (BRILINTA) 90 MG TABS tablet Take 90 mg by mouth 2 times daily      isosorbide mononitrate (IMDUR) 60 MG extended release tablet Take 1 tablet by mouth daily 90 tablet 3    ipratropium-albuterol (DUONEB) 0.5-2.5 (3) MG/3ML SOLN nebulizer solution Inhale 3 mLs into the lungs every 4 hours as needed for Shortness of Breath 360 mL 0    carvedilol (COREG) 3.125 MG tablet Take 1 tablet by mouth 2 times daily 60 tablet 3    sertraline (ZOLOFT) 50 MG tablet TAKE 1 TABLET BY MOUTH DAILY 90 tablet 0    nystatin (NYAMYC) 386463 UNIT/GM powder APPLY TO ABDOMINAL FOLDS EVERY 12 Constitutional:       Appearance: Normal appearance. HENT:      Head:     Cardiovascular:      Rate and Rhythm: Normal rate and regular rhythm. Pulmonary:      Effort: Pulmonary effort is normal.      Breath sounds: Normal breath sounds. Neurological:      Mental Status: She is alert. Assessment and Plan      ICD-10-CM    1. Encounter for staple removal  Z48.02 [id number] - RI REMOVAL OF SUTURES       Orders Placed This Encounter   Procedures     - RI REMOVAL OF SUTURES     Successfully removed all 10 staples from scalp. No signs of infection. Patient tolerated well. No orders of the defined types were placed in this encounter. Reviewed with the patient: current clinicalstatus, medications, activities and diet. Side effects, adverse effects of the medication prescribed today, as well as treatment plan and result expectations have been discussed with the patient who expressesunderstanding and desires to proceed. Close follow up to evaluate treatment results and for coordination of care. I have reviewed the patient's medical history in detail and updated the computerized patientrecord. Return if symptoms worsen or fail to improve, for follow up with PCP.     MARCELO Riggins

## 2020-08-11 NOTE — PROGRESS NOTES
Jerri Arana is a 56 y.o. female evaluated via telephone on 8/10/2020. Consent:  She and/or health care decision maker is aware that that she may receive a bill for this telephone service, depending on her insurance coverage, and has provided verbal consent to proceed: Yes      Documentation:  I communicated with the patient and/or health care decision maker about see below. Details of this discussion including any medical advice provided: see below      I affirm this is a Patient Initiated Episode with an Established Patient who has not had a related appointment within my department in the past 7 days or scheduled within the next 24 hours. Total Time: minutes: 11-20 minutes    Note: not billable if this call serves to triage the patient into an appointment for the relevant concern      Torin VALDEZ     8/10/2020    TELEHEALTH EVALUATION -- Audio (During - public health emergency)    HPI:    Jerri Arana (:  1960) has requested an audio evaluation for the following concern(s):    ; Has been hospitalized several times, has had extended stays at skilled nursing facilities related to deconditioning, mental health concerns, multiple complicated chronic conditions including coronary artery disease, uncontrolled type 2 diabetes mellitus, CKD 4. She recently fell at  Home as she was transferring from one seated position to another and hit her head against a wall. She now has 14 staples on her head. She states that she uses a walker for most ambulation but that transfers are definitely challenging for her. She does have home health care including home PT and OT. She has chronic shortness of breath which is not changed. No new cough, nausea, vomiting. She has intermittent diarrhea. That diarrhea started 2 days ago along with burning dysuria and frequent urination without gross blood evident. Her temperature over the last 2 days has been approximately 99 degrees.   She does have a history of UTIs. Review of Systems    Prior to Visit Medications    Medication Sig Taking? Authorizing Provider   sulfamethoxazole-trimethoprim (BACTRIM) 400-80 MG per tablet Take 1 tablet by mouth 2 times daily for 5 days Yes Letha Keller, MD   aluminum & magnesium hydroxide-simethicone (ALMACONE) 200-200-20 MG/5ML SUSP suspension Take 30 mLs by mouth every 4 hours as needed for Indigestion  Historical Provider, MD   ASPIRIN LOW DOSE 81 MG EC tablet   Historical Provider, MD   Magnesium Oxide -Mg Supplement 400 MG CAPS   Historical Provider, MD   triamcinolone (KENALOG) 0.1 % cream APPLY TO AFFECTED AREA TWICE DAILY  Fabialegama Daily, MD   ticagrelor (BRILINTA) 90 MG TABS tablet Take 90 mg by mouth 2 times daily  Historical Provider, MD   isosorbide mononitrate (IMDUR) 60 MG extended release tablet Take 1 tablet by mouth daily  Oneedwarda MD Dex[place]   ipratropium-albuterol (DUONEB) 0.5-2.5 (3) MG/3ML SOLN nebulizer solution Inhale 3 mLs into the lungs every 4 hours as needed for Shortness of Breath  Yash Martin MD   carvedilol (COREG) 3.125 MG tablet Take 1 tablet by mouth 2 times daily  Yash Martin MD   sertraline (ZOLOFT) 50 MG tablet TAKE 1 TABLET BY MOUTH DAILY  Evalegama Daily, MD   nystatin ([address]) 546025 UNIT/GM powder APPLY TO ABDOMINAL FOLDS EVERY 12 HOURS AS NEEDED  Letha Keller MD   insulin aspart (NOVOLOG FLEXPEN) 100 UNIT/ML injection pen Inject into the skin 3 times daily (before meals) 12 units TID before meals. Also use sliding scale- 1 unit for every 50 over 150.   Historical Provider, MD   insulin glargine (LANTUS SOLOSTAR) 100 UNIT/ML injection pen Inject into the skin nightly Inject 35 units once nightly  Historical Provider, MD   Blood Glucose Monitoring Suppl (FREESTYLE LITE) CORETTA 1 Device by Does not apply route daily as needed Use freestyle meter to test blood sugar as needed  Historical Provider, MD   ranolazine (RANEXA) 500 MG extended release tablet Take 1 tablet by mouth 2 times daily  Love Lucero DO   ARIPiprazole (ABILIFY) 5 MG tablet Take 1 tablet by mouth daily  Karen Baca MD   pantoprazole (PROTONIX) 20 MG tablet TAKE 1 TABLET BY MOUTH DAILY  Kofi Hernandez MD   amLODIPine (NORVASC) 10 MG tablet TAKE 1 TABLET BY MOUTH DAILY  Kofi Hernandez MD   aspirin 81 MG tablet Take 1 tablet by mouth daily  Karen Baca MD   atorvastatin (LIPITOR) 40 MG tablet Take 1 tablet by mouth daily  Karen Baca MD   SURE COMFORT PEN NEEDLES 30G X 8 MM MISC USE AS DIRECTED FIVE TIMES A DAY  Karen Baca MD   melatonin 3 MG TABS tablet TAKE 1 TABLET BY MOUTH EVERY NIGHT AS NEEDED FOR INSOMNIA  Karen Baca MD   nitroGLYCERIN (NITROSTAT) 0.4 MG SL tablet Place 1 tablet under the tongue every 5 minutes as needed for Chest pain  Historical Provider, MD   magnesium oxide (MAG-OX) 400 MG tablet Take 400 mg by mouth daily  Historical Provider, MD   vitamin B-12 (CYANOCOBALAMIN) 100 MCG tablet TAKE 1 TABLET BY MOUTH DAILY  Karen Baca MD   pregabalin (LYRICA) 75 MG capsule Give one capsule po bid  Rashi Lawler APRN - CNP       Social History     Tobacco Use    Smoking status: Passive Smoke Exposure - Never Smoker    Smokeless tobacco: Never Used   Substance Use Topics    Alcohol use: No     Alcohol/week: 0.0 standard drinks    Drug use: No        Allergies   Allergen Reactions    Codeine Hives     hives    Oxycontin [Oxycodone Hcl] Hives   ,   Past Medical History:   Diagnosis Date    Anxiety     CAD S/P percutaneous coronary angioplasty 2015, 2018    stents per dr Ilia Dean    CKD stage 4 due to type 2 diabetes mellitus [facility]     [facility] 2014    DJD (degenerative joint disease) of knee     Dr Michelle Adams GERD (gastroesophageal reflux disease)     Hemiparesis, left ([facility] 2013 Hypertension Father     Diabetes Sister     Mental Illness Sister        PHYSICAL EXAMINATION:  [ INSTRUCTIONS:  \"[x]\" Indicates a positive item  \"[]\" Indicates a negative item  -- DELETE ALL ITEMS NOT EXAMINED]  Vital Signs: unavailable    Patient appears to be alert and oriented to person, place, time, situation and is in no acute distress. Respiratory effort appears normal. Mood appears stable and speech and thought are grossly normal.    ASSESSMENT/PLAN:  Diagnoses and all orders for this visit:    Hemiparesis, left ([address]  Comments:  Stable, fair control in that patient is having difficulty with ambulation and transfers. PT involved. Patient requests wheelchair. Unsure w/c appropriate; aw    Schizophrenia, paranoid, chronic  Comments:  Stable, fair control. ESRD (end stage renal disease) on dialysis [facility])  Comments: Worse, fair control. Undergoing vascular mapping and preparation for fistula placement. Essential hypertension  Comments:  Stable and well-controlled. Recent blood pressure 111/70. Acute cystitis without hematuria  Comments:  Treat empirically with Bactrim single strength twice daily for 5 days. Orders:  -     sulfamethoxazole-trimethoprim (BACTRIM) 400-80 MG per tablet; Take 1 tablet by mouth 2 times daily for 5 days          Return in about 2 months (around 10/10/2020), or multiple cc - OV. Lily Lynch is a 56 y.o. female being evaluated by a Virtual Visit (telephonic visit) encounter to address concerns as mentioned above. A caregiver was present when appropriate. Due to this being a TeleHealth encounter (During [facility] public health emergency), evaluation of the following organ systems was limited: Vitals/Constitutional/EENT/Resp/CV/GI//MS/Neuro/Skin/Heme-Lymph-Imm.   Pursuant to the emergency declaration under the [facility] authority and the [employer], this Virtual Visit was conducted with patient's (and/or legal guardian's) consent, to reduce the patient's risk of exposure to COVID-19 and provide necessary medical care. The patient (and/or legal guardian) has also been advised to contact this office for worsening conditions or problems, and seek emergency medical treatment and/or call 911 if deemed necessary. Services were provided through a telephonic synchronous discussion virtually to substitute for in-person clinic visit. Patient and provider were located at their individual homes. --Wesley Moreira MD on 8/11/2020 at 5:23 PM    An electronic signature was used to authenticate this note.

## 2020-08-13 ENCOUNTER — TELEPHONE (OUTPATIENT)
Dept: FAMILY MEDICINE CLINIC | Age: 60
End: 2020-08-13

## 2020-08-13 NOTE — TELEPHONE ENCOUNTER
Patient called in today and was wondering what the status of the paperwork for the wheel chair. Patient states that the physical therapy states that they have sent over the paper work.

## 2020-08-17 VITALS
HEART RATE: 68 BPM | TEMPERATURE: 98.1 F | DIASTOLIC BLOOD PRESSURE: 76 MMHG | OXYGEN SATURATION: 97 % | SYSTOLIC BLOOD PRESSURE: 131 MMHG | RESPIRATION RATE: 18 BRPM

## 2020-08-17 PROBLEM — R79.89 ELEVATED PROLACTIN LEVEL: Status: RESOLVED | Noted: 2020-08-10 | Resolved: 2020-08-17

## 2020-08-17 PROBLEM — T40.601A OPIATE OVERDOSE (HCC): Status: RESOLVED | Noted: 2020-08-10 | Resolved: 2020-08-17

## 2020-08-17 PROBLEM — G40.909 SEIZURE DISORDER (HCC): Status: RESOLVED | Noted: 2020-08-10 | Resolved: 2020-08-17

## 2020-08-17 NOTE — PROGRESS NOTES
Name: [facility] [address]: [facility]  [facility]  Ailyn José  Date: 7/17/2020     Subjective:     Chief Complaint   Patient presents with    Follow-up       HPI  Nivia Braun is a 56 y.o. female being seen today for evaluation of acute rehab progress and management of chronic conditions. She is participating in physical therapy and making progress. Resident is able to ambulate without assistive devices but does use front wheel walker. Physical therapist this reports that resident will be ready to discontinue therapy in about a week, she is moderate independence for bathing and dressing, no tub transfers since patient is on dialysis and has a dialysis catheter, and they recommend home health care at discharge for further strengthening, balance, endurance, and gait. She does experience some shortness of breath with exertion in therapy that resolves with rest.  Blood sugars are inconsistent and range between 100-300 she is currently on Lantus and sliding scale insulin coverage. Most recent labs are within acceptable range. She is on Abilify for her schizophrenia which seems to manage her behaviors. She denies complaints of any pain or discomfort at this time says pain is 0 out of 10 on a scale of 10. Resident also denies complaints of chest pain chest palpitations nausea vomiting diarrhea constipation lightheadedness or dizziness. Resident thinks she will be ready to discharge home soon. She does have an appointment in the future for evaluation for an AV fistula for hemodialysis. Vital signs are stable no fever continue PT OT as ordered. Continue medications as ordered. Allergies:  Reviewed in nursing facility records  Medications:  Reviewed and reconciled in nursing facility records    Review of Systems Pertinent positives as noted in HPI.         Objective:   /76   Pulse 68   Temp 98.1 °F (36.7 °C)   Resp 18   LMP  (LMP Unknown)   SpO2 97% updated the computerized patient record. This note was partially generated using Dragon voice recognition system, and there may be some incorrect words, spellings, punctuation that were not noticed in checking the note before saving.     LORETO Martinez-CNP

## 2020-08-24 ENCOUNTER — OFFICE VISIT (OUTPATIENT)
Dept: FAMILY MEDICINE CLINIC | Age: 60
End: 2020-08-24
Payer: MEDICARE

## 2020-08-24 VITALS
DIASTOLIC BLOOD PRESSURE: 84 MMHG | HEART RATE: 66 BPM | SYSTOLIC BLOOD PRESSURE: 128 MMHG | TEMPERATURE: 97.6 F | RESPIRATION RATE: 15 BRPM | OXYGEN SATURATION: 98 %

## 2020-08-24 PROCEDURE — 1036F TOBACCO NON-USER: CPT | Performed by: NURSE PRACTITIONER

## 2020-08-24 PROCEDURE — 3017F COLORECTAL CA SCREEN DOC REV: CPT | Performed by: NURSE PRACTITIONER

## 2020-08-24 PROCEDURE — G8427 DOCREV CUR MEDS BY ELIG CLIN: HCPCS | Performed by: NURSE PRACTITIONER

## 2020-08-24 PROCEDURE — 99213 OFFICE O/P EST LOW 20 MIN: CPT | Performed by: NURSE PRACTITIONER

## 2020-08-24 PROCEDURE — G8417 CALC BMI ABV UP PARAM F/U: HCPCS | Performed by: NURSE PRACTITIONER

## 2020-08-24 RX ORDER — FLUCONAZOLE 150 MG/1
TABLET ORAL
Qty: 2 TABLET | Refills: 0 | Status: SHIPPED | OUTPATIENT
Start: 2020-08-24 | End: 2020-12-24 | Stop reason: ALTCHOICE

## 2020-08-24 ASSESSMENT — ENCOUNTER SYMPTOMS
DIARRHEA: 0
ABDOMINAL PAIN: 0
BACK PAIN: 0
SORE THROAT: 0
NAUSEA: 0

## 2020-08-24 NOTE — PATIENT INSTRUCTIONS
Patient Education        Candidiasis: Care Instructions  Your Care Instructions  Candidiasis (say \"vku-esk-HY-uh-doreen\") is a yeast infection. Yeast normally lives in your body. But it can cause problems if your body's defenses don't work as they should. Some medicines can increase your chance of getting a yeast infection. These include antibiotics, steroids, and cancer drugs. And some diseases like AIDS and diabetes can make you more likely to get yeast infections. There are different types of yeast infections. Karli Huertaler is a yeast infection in the mouth. It usually occurs in people with weak immune systems. It causes white patches inside the mouth and throat. Yeast infections of the skin usually occur in skin folds where the skin stays moist. They cause red, oozing patches on your skin. Babies can get these infections under the diaper. People who often wear gloves can get them on their hands. Many women get vaginal yeast infections. They are most common when women take antibiotics. These infections can cause the vagina to itch and burn. They also cause white discharge that looks like cottage cheese. In rare cases, yeast infects the blood. This can cause serious disease. This kind of infection is treated with medicine given through a needle into a vein (IV). After you start treatment, a yeast infection usually goes away quickly. But if your immune system is weak, the infection may come back. Tell your doctor if you get yeast infections often. Follow-up care is a key part of your treatment and safety. Be sure to make and go to all appointments, and call your doctor if you are having problems. It's also a good idea to know your test results and keep a list of the medicines you take. How can you care for yourself at home? · Take your medicines exactly as prescribed. Call your doctor if you think you are having a problem with your medicine. · Use antibiotics only as directed by your doctor.   · Eat yogurt with live cultures. It has bacteria called lactobacillus. It may help prevent some types of yeast infections. · Keep your skin clean and dry. Put powder on moist places. · If you are using a cream or suppository to treat a vaginal yeast infection, don't use condoms or a diaphragm. Use a different type of birth control. · Eat a healthy diet and get regular exercise. This will help keep your immune system strong. When should you call for help? Watch closely for changes in your health, and be sure to contact your doctor if:  · You do not get better as expected. Where can you learn more? Go to https://chpepiceweb.health[employer]row[employer] Ltd. org and sign in to your Cumul[employer] account. Enter I427 in the TagArray box to learn more about \"Candidiasis: Care Instructions. \"     If you do not have an account, please click on the \"Sign Up Now\" link. Current as of: November 8, 2019               Content Version: 12.5  © 6015-4607 [employer]. Care instructions adapted under license by [facility] [employer] [facility] ([facility]). If you have questions about a medical condition or this instruction, always ask your healthcare professional. [place] [address] any warranty or liability for your use of this information. Patient Education        Vaginal Yeast Infection: Care Instructions  Your Care Instructions     A vaginal yeast infection is caused by too many yeast cells in the vagina. This is common in women of all ages. Itching, vaginal discharge and irritation, and other symptoms can bother you. But yeast infections don't often cause other health problems. Some medicines can increase your risk of getting a yeast infection. These include antibiotics, birth control pills, hormones, and steroids. You may also be more likely to get a yeast infection if you are pregnant, have diabetes, douche, or wear tight clothes. With treatment, most yeast infections get better in 2 to 3 days.   Follow-up care is a key part of your treatment and safety. Be sure to make and go to all appointments, and call your doctor if you are having problems. It's also a good idea to know your test results and keep a list of the medicines you take. How can you care for yourself at home? · Take your medicines exactly as prescribed. Call your doctor if you think you are having a problem with your medicine. · Ask your doctor about over-the-counter (OTC) medicines for yeast infections. They may cost less than prescription medicines. If you use an OTC treatment, read and follow all instructions on the label. · Do not use tampons while using a vaginal cream or suppository. The tampons can absorb the medicine. Use pads instead. · Wear loose cotton clothing. Do not wear nylon or other fabric that holds body heat and moisture close to the skin. · Try sleeping without underwear. · Do not scratch. Relieve itching with a cold pack or a cool bath. · Do not wash your vaginal area more than once a day. Use plain water or a mild, unscented soap. Air-dry the vaginal area. · Change out of wet swimsuits after swimming. · Do not have sex until you have finished your treatment. · Do not douche. When should you call for help? Call your doctor now or seek immediate medical care if:  · You have unexpected vaginal bleeding. · You have new or increased pain in your vagina or pelvis. Watch closely for changes in your health, and be sure to contact your doctor if:  · You have a fever. · You are not getting better after 2 days. · Your symptoms come back after you finish your medicines. Where can you learn more? Go to https://Incentientjudit.[employer]. org and sign in to your [employer]al [employer]ognition Systems account. Enter M031 in the Cloze box to learn more about \"Vaginal Yeast Infection: Care Instructions. \"     If you do not have an account, please click on the \"Sign Up Now\" link.   Current as of: November 8, 2019               Content Version: 12.5  © 5183-3670 Healthwise, Incorporated. Care instructions adapted under license by [facility] ([facility]). If you have questions about a medical condition or this instruction, always ask your healthcare professional. [address] any warranty or liability for your use of this information.

## 2020-08-24 NOTE — PROGRESS NOTES
Subjective:      Patient ID: Heidi Westfall is a 56 y.o. female who presents today for:  Chief Complaint   Patient presents with    Vaginal Discharge     Patient present today wt C/O vaginal itching. Patient states that this has been going on for two days and has showed no sings of imrpovement.  Vaginal Itching     x 2 days       Vaginal Itching   The patient's primary symptoms include genital itching and vaginal discharge (white colored per pt). The patient's pertinent negatives include no genital lesions, genital odor, missed menses or pelvic pain. This is a new problem. The current episode started yesterday (x 2 days). The problem occurs daily. The problem has been unchanged. The patient is experiencing no pain. The problem affects both sides. She is not pregnant. Associated symptoms include frequency (pt reports her occasional frequency is due to her renal issues -on dialysis). Pertinent negatives include no abdominal pain, anorexia, back pain, chills, constipation, diarrhea, discolored urine, dysuria, fever, flank pain, headaches, hematuria, nausea, painful intercourse, rash, sore throat, urgency or vomiting. The vaginal discharge was white and thick (pt reports the discharge is sticky, white and \"cottage cheese in apppearance\". ). There has been no bleeding. She has not been passing clots. She has not been passing tissue. Nothing aggravates the symptoms. She has tried nothing for the symptoms. She is not sexually active. No, her partner does not have an STD. She uses nothing for contraception. She is postmenopausal. There is no history of an abdominal surgery, a  section, an ectopic pregnancy, endometriosis, a gynecological surgery, herpes simplex, menorrhagia, miscarriage, ovarian cysts, perineal abscess, PID, an STD, a terminated pregnancy or vaginosis. Vaginal Discharge   The patient's primary symptoms include genital itching and vaginal discharge (white colored per pt).  The patient's pertinent negatives include no genital lesions, genital odor, missed menses or pelvic pain. This is a new problem. The current episode started yesterday (x 2 days). The problem occurs daily. The problem has been unchanged. The patient is experiencing no pain. The problem affects both sides. She is not pregnant. Associated symptoms include frequency (pt reports her occasional frequency is due to her renal issues -on dialysis). Pertinent negatives include no abdominal pain, anorexia, back pain, chills, constipation, diarrhea, discolored urine, dysuria, fever, flank pain, headaches, hematuria, nausea, painful intercourse, rash, sore throat, urgency or vomiting. The vaginal discharge was white and thick. There has been no bleeding. She has not been passing clots. She has not been passing tissue. Nothing aggravates the symptoms. She has tried nothing for the symptoms. She is not sexually active. No, her partner does not have an STD. She uses nothing for contraception. She is postmenopausal. There is no history of an abdominal surgery, a  section, an ectopic pregnancy, endometriosis, a gynecological surgery, herpes simplex, menorrhagia, miscarriage, ovarian cysts, perineal abscess, PID, an STD, a terminated pregnancy or vaginosis. Pt reports just finishing an ATB yesterday. Explained to pt people can get yeast infections from ATB's.       Past Medical History:   Diagnosis Date    Anxiety     CAD S/P percutaneous coronary angioplasty ,     stents per dr Javier Lopes CKD stage 4 due to type 2 diabetes mellitus ([address]     Diabetic nephropathy with proteinuria ([address]     DJD (degenerative joint disease) of knee     Dr Cecilia Santos GERD (gastroesophageal reflux disease)     Hemiparesis, left ([address] 2013    [facility])    History of heart failure     History of seizures     History of type C viral hepatitis     HTN (hypertension)     Hyperlipidemia     Impaired mobility and activities of daily living     Mediastinal lymphadenopathy     Lexie Siddiqi    Metabolic syndrome     Neurogenic urinary incontinence 2013    [facility]uropathy in diabetes [facility]     Obesity (BMI 30-39. 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 Donaldo Turcios      x1 Dr. Areli Valentino, Dr Padmini Bond CATH LAB PROCEDURE  10/02/2019    HYSTERECTOMY, TOTAL ABDOMINAL      one ovary intact, Dr Ariadna Irby, menorrhagia    VT TOTAL KNEE ARTHROPLASTY Left 2018    LEFT KNEE TOTAL KNEE ARTHROPLASTY, SHAYNA, NERVE BLOCK performed by Attila Musa MD at [address] [place] [address] Right     TOTAL KNEE ARTHROPLASTY  16    Dr Rubin Wolff TUNNELED [address] Right 2020    tunneled HD catheter per Dr Raeanne Boeck Marital status:      Spouse name: Not on file    Number of children: 2    Years of education: Not on file    Highest education level: Not on file   Occupational History    Occupation: disabled   Social Needs    Financial resource strain: Not on file    Food insecurity     Worry: Not on file     Inability: Not on file   [employer] needs     Medical: Not on file     Non-medical: Not on file   Tobacco Use    Smoking status: Passive Smoke Exposure - Never Smoker    Smokeless tobacco: Never Used   Substance and Sexual Activity    Alcohol use: No     Alcohol/week: 0.0 standard drinks    Drug use: No    Sexual activity: Not Currently   Lifestyle    Physical activity     Days per week: Not on file     Minutes per session: Not on file    Stress: Not on file   Relationships    Social connections     Talks on phone: Not on file     Gets together: Not on file     Attends Catholic service: Not on file     Active member of club or organization: Not on file     Attends meetings of clubs or organizations: Not on file     Relationship status: Not on file    Intimate partner violence     Fear of current or ex partner: Not on file     Emotionally abused: Not on file     Physically abused: Not on file     Forced sexual activity: Not on file   Other Topics Concern    Not on file   Social History Narrative    Born in [place], one of 5    Twin sister Reyes, very ill in 2018, [place] [address]    Moved to [place], , 2 children, one son and one daughter    Worked at [employer], as a nurse's aide    Disabled due to mental illness    Lived at [employer], was discharged, returned to independent living in 2017 in the daughter's house and has adjusted well    One son and one daughter, live in the same house with patient, Jere Mckenna pays the rent    [facility] reading ([employer])     Family History   Problem Relation Age of Onset    Cancer Mother 76        survived   Mely Langton Hypertension Father     Diabetes Sister     Mental Illness Sister      Allergies   Allergen Reactions    Codeine Hives     hives    Oxycontin [Oxycodone Hcl] Hives         Review of Systems   Constitutional: Negative for activity change, chills and fever. HENT: Negative for congestion, drooling, ear pain, postnasal drip, rhinorrhea, sore throat and trouble swallowing. Eyes: Negative for visual disturbance.    Respiratory: Negative for apnea, cough, chest tightness, shortness of breath and wheezing. Cardiovascular: Negative for chest pain and palpitations. Gastrointestinal: Negative for abdominal distention, abdominal pain, anorexia, constipation, diarrhea, nausea and vomiting. Endocrine: Negative for polydipsia, polyphagia and polyuria. Genitourinary: Positive for frequency (pt reports her occasional frequency is due to her renal issues -on dialysis) and vaginal discharge (white colored per pt). Negative for dysuria, flank pain, hematuria, menorrhagia, missed menses, pelvic pain, urgency, vaginal bleeding and vaginal pain. Musculoskeletal: Negative for arthralgias, back pain and myalgias. Skin: Negative for rash. Allergic/Immunologic: Negative for immunocompromised state. Neurological: Negative for dizziness, weakness, light-headedness and headaches. Hematological: Negative for adenopathy. Psychiatric/Behavioral: Negative for confusion. All other systems reviewed and are negative. Objective:   /84   Pulse 66   Temp 97.6 °F (36.4 °C) (Oral)   Resp 15   LMP  (LMP Unknown)   SpO2 98%     Physical Exam  Vitals signs and nursing note (pt denied a physical exam of vagina today) reviewed. Constitutional:       General: She is awake. She is not in acute distress. Appearance: Normal appearance. She is well-developed, well-groomed and overweight. She is not ill-appearing, toxic-appearing or diaphoretic. HENT:      Head: Normocephalic and atraumatic. Nose: Nose normal.      Mouth/Throat:      Mouth: Mucous membranes are moist.   Eyes:      Conjunctiva/sclera: Conjunctivae normal.      Pupils: Pupils are equal, round, and reactive to light. Neck:      Musculoskeletal: Normal range of motion. Cardiovascular:      Rate and Rhythm: Normal rate and regular rhythm. Pulses: Normal pulses. Heart sounds: Normal heart sounds. No murmur.    Pulmonary:      Effort: Pulmonary effort is normal. No tachypnea, bradypnea or respiratory distress. Breath sounds: Normal breath sounds. No stridor. No decreased breath sounds or wheezing. Chest:      Chest wall: No tenderness. Abdominal:      General: Bowel sounds are normal. There is no distension. Palpations: Abdomen is soft. Tenderness: There is no abdominal tenderness. There is no right CVA tenderness, left CVA tenderness, guarding or rebound. Hernia: No hernia is present. Genitourinary:     Labia:         Right: No injury. Left: No injury. Urethra: No urethral pain. Vagina: Vaginal discharge (pt reports sticky, thick white discharge) present. Comments: Pt reports vaginal itching and just finishing an ATB yesterday. Pt declines a physical exam today. Pt reports vagina is slightly red and irritated from itching and she has some white sticky thick discharge. Pt educated that ATB's can cause yeast infections. Pt verbalized understanding. Musculoskeletal: Normal range of motion. General: No signs of injury. Left lower leg: No edema. Lymphadenopathy:      Cervical: No cervical adenopathy. Skin:     General: Skin is warm and dry. Capillary Refill: Capillary refill takes less than 2 seconds. Neurological:      General: No focal deficit present. Mental Status: She is alert and oriented to person, place, and time. Mental status is at baseline. Motor: No weakness. Coordination: Coordination normal.   Psychiatric:         Mood and Affect: Mood normal.         Behavior: Behavior normal. Behavior is cooperative. Thought Content: Thought content normal.         Judgment: Judgment normal.         Assessment:       Diagnosis Orders   1. Yeast infection of the vagina  fluconazole (DIFLUCAN) 150 MG tablet         Plan:      No orders of the defined types were placed in this encounter.     Orders Placed This Encounter   Medications    fluconazole (DIFLUCAN) 150 MG tablet     Sig: Take one 150 mg tablet today orally and then 72 hours later (3 days) take take the other 150 mg tablet orally. Dispense:  2 tablet     Refill:  0   Patient presents today with c/o possible yeast infection as pt reports a white,sticky, thick vaginal discharge and itching that started yesterday. Pt reports her last ATB dose was 8/23 and she noticed these s/s. Educated pt about ATB's and collaborator Dr. Childress Round stated two pills of Diflucan can assist this pt even if she has stage 4 kidney failure. Pt prescribed two doses of Diflucan and explained how to take the pills. Pt verbalized understanding and advised to follow up with PCP if her s/s persist/worsen. Discussed signs and symptoms which require immediate follow-up in ED/call to 911. Patient verbalized understanding. Pt left the [facility] today in stable condition. Return if symptoms worsen or fail to improve. Reviewed with the patient: current clinical status, medications, activities and diet. Side effects, adverse effects of the medication prescribed today, as well as treatment plan and result expectations have been discussed with the patient who expresses understanding and desires to proceed. Close follow up to evaluate treatment results and for coordination of care. I have reviewed the patient's medical history in detail and updated the computerized patient record.       [place], LORETO - CNP

## 2020-08-25 RX ORDER — INSULIN GLARGINE 100 [IU]/ML
INJECTION, SOLUTION SUBCUTANEOUS
Qty: 15 ML | Refills: 10 | Status: ON HOLD | OUTPATIENT
Start: 2020-08-25 | End: 2021-01-14 | Stop reason: SDUPTHER

## 2020-08-25 ASSESSMENT — ENCOUNTER SYMPTOMS
VOMITING: 0
APNEA: 0
WHEEZING: 0
COUGH: 0
SHORTNESS OF BREATH: 0
ABDOMINAL DISTENTION: 0
CONSTIPATION: 0
RHINORRHEA: 0
CHEST TIGHTNESS: 0
TROUBLE SWALLOWING: 0

## 2020-08-31 RX ORDER — INSULIN ASPART 100 [IU]/ML
INJECTION, SOLUTION INTRAVENOUS; SUBCUTANEOUS
Qty: 15 ML | Refills: 10 | Status: SHIPPED | OUTPATIENT
Start: 2020-08-31 | End: 2020-09-02 | Stop reason: SDUPTHER

## 2020-09-02 ENCOUNTER — TELEPHONE (OUTPATIENT)
Dept: FAMILY MEDICINE CLINIC | Age: 60
End: 2020-09-02

## 2020-09-02 NOTE — TELEPHONE ENCOUNTER
We can try to get one with those diagnoses but I am unable to determine specs for w/c. Recommend transport chair only.

## 2020-09-02 NOTE — TELEPHONE ENCOUNTER
Patient was denied by PT for a wheelchair. Family and HHC stress that she would great willie benefit from having a wheelchair to help her transport to and from appointments and dialysis. Can we try to get her one from a [employer] company biased on her current problem list mainly her gait instibility, SOB, CAD with angina, CKD, diastolic CHF and Pulmonary HTN.

## 2020-09-02 NOTE — TELEPHONE ENCOUNTER
Jaye Lozano was contacted to check on the status of the wheelchair the patient had requested. They will be calling back after checking with the family.

## 2020-09-04 RX ORDER — CARVEDILOL 3.12 MG/1
3.12 TABLET ORAL 2 TIMES DAILY
Qty: 60 TABLET | Refills: 3 | Status: SHIPPED | OUTPATIENT
Start: 2020-09-04 | End: 2020-09-09 | Stop reason: SDUPTHER

## 2020-09-08 RX ORDER — WHEELCHAIR
EACH MISCELLANEOUS
Qty: 1 EACH | Refills: 0 | Status: SHIPPED | OUTPATIENT
Start: 2020-09-08 | End: 2021-10-11

## 2020-09-08 RX ORDER — INSULIN ASPART 100 [IU]/ML
INJECTION, SOLUTION INTRAVENOUS; SUBCUTANEOUS
Qty: 15 ML | Refills: 10 | Status: ON HOLD | OUTPATIENT
Start: 2020-09-08 | End: 2020-12-28 | Stop reason: SDUPTHER

## 2020-09-09 RX ORDER — CARVEDILOL 3.12 MG/1
3.12 TABLET ORAL 2 TIMES DAILY
Qty: 60 TABLET | Refills: 3 | Status: SHIPPED | OUTPATIENT
Start: 2020-09-09 | End: 2020-09-23 | Stop reason: ALTCHOICE

## 2020-09-16 NOTE — TELEPHONE ENCOUNTER
Rx request   Requested Prescriptions     Pending Prescriptions Disp Refills    triamcinolone (KENALOG) 0.1 % cream [Pharmacy Med Name: TRIAMCINOLONE 0.1% CRM 80GM 0.1 CRM] 80 g 1     Sig: APPLY TO AFFECTED AREA TWICE DAILY AS DIRECTED     LOV 8/10/2020  Next Visit Date:  Future Appointments   Date Time Provider Aminata Cortes   9/23/2020 12:00 PM Iron Barnhart MD [facility]   9/23/2020 12:15 PM Iron Barnhart MD [facility]   10/28/2020 10:30 AM Sujit Haider MD [facility]

## 2020-09-17 RX ORDER — TRIAMCINOLONE ACETONIDE 1 MG/G
CREAM TOPICAL
Qty: 80 G | Refills: 1 | Status: SHIPPED | OUTPATIENT
Start: 2020-09-17 | End: 2020-09-22 | Stop reason: SDUPTHER

## 2020-09-22 RX ORDER — TRIAMCINOLONE ACETONIDE 1 MG/G
CREAM TOPICAL
Qty: 80 G | Refills: 1 | Status: SHIPPED | OUTPATIENT
Start: 2020-09-22 | End: 2020-09-23

## 2020-09-22 NOTE — TELEPHONE ENCOUNTER
Rx request   Requested Prescriptions     Pending Prescriptions Disp Refills    triamcinolone (KENALOG) 0.1 % cream 80 g 1     Sig: APPLY TO AFFECTED AREA TWICE DAILY AS DIRECTED    sertraline (ZOLOFT) 50 MG tablet 90 tablet 0     Sig: TAKE 1 TABLET BY MOUTH DAILY     LOV 8/10/2020  Next Visit Date:  Future Appointments   Date Time Provider Aminata Cortes   9/23/2020 11:20 AM Shruthi Kahn, [address] [place] [address]   9/23/2020 12:00 PM Lorren Burkitt, MD [facility]   9/23/2020 12:15 PM Lorren Burkitt, MD [facility]   10/28/2020 10:30 AM Sophia Stacy MD [address] [place] [address]

## 2020-09-23 ENCOUNTER — VIRTUAL VISIT (OUTPATIENT)
Dept: FAMILY MEDICINE CLINIC | Age: 60
End: 2020-09-23
Payer: MEDICARE

## 2020-09-23 ENCOUNTER — OFFICE VISIT (OUTPATIENT)
Dept: ENDOCRINOLOGY | Age: 60
End: 2020-09-23
Payer: MEDICARE

## 2020-09-23 VITALS
BODY MASS INDEX: 34.21 KG/M2 | WEIGHT: 218 LBS | DIASTOLIC BLOOD PRESSURE: 64 MMHG | SYSTOLIC BLOOD PRESSURE: 109 MMHG | HEIGHT: 67 IN | HEART RATE: 74 BPM

## 2020-09-23 PROBLEM — J45.40 MODERATE PERSISTENT ASTHMA WITHOUT COMPLICATION: Chronic | Status: ACTIVE | Noted: 2020-09-23

## 2020-09-23 LAB
CHP ED QC CHECK: NORMAL
GLUCOSE BLD-MCNC: 245 MG/DL

## 2020-09-23 PROCEDURE — 3017F COLORECTAL CA SCREEN DOC REV: CPT | Performed by: FAMILY MEDICINE

## 2020-09-23 PROCEDURE — 3044F HG A1C LEVEL LT 7.0%: CPT | Performed by: FAMILY MEDICINE

## 2020-09-23 PROCEDURE — 99214 OFFICE O/P EST MOD 30 MIN: CPT | Performed by: PHYSICIAN ASSISTANT

## 2020-09-23 PROCEDURE — 3017F COLORECTAL CA SCREEN DOC REV: CPT | Performed by: PHYSICIAN ASSISTANT

## 2020-09-23 PROCEDURE — G0438 PPPS, INITIAL VISIT: HCPCS | Performed by: FAMILY MEDICINE

## 2020-09-23 PROCEDURE — G8427 DOCREV CUR MEDS BY ELIG CLIN: HCPCS | Performed by: FAMILY MEDICINE

## 2020-09-23 PROCEDURE — 99214 OFFICE O/P EST MOD 30 MIN: CPT | Performed by: FAMILY MEDICINE

## 2020-09-23 PROCEDURE — 82962 GLUCOSE BLOOD TEST: CPT | Performed by: PHYSICIAN ASSISTANT

## 2020-09-23 PROCEDURE — 3044F HG A1C LEVEL LT 7.0%: CPT | Performed by: PHYSICIAN ASSISTANT

## 2020-09-23 PROCEDURE — 2022F DILAT RTA XM EVC RTNOPTHY: CPT | Performed by: PHYSICIAN ASSISTANT

## 2020-09-23 PROCEDURE — 2022F DILAT RTA XM EVC RTNOPTHY: CPT | Performed by: FAMILY MEDICINE

## 2020-09-23 PROCEDURE — 1036F TOBACCO NON-USER: CPT | Performed by: PHYSICIAN ASSISTANT

## 2020-09-23 PROCEDURE — G8417 CALC BMI ABV UP PARAM F/U: HCPCS | Performed by: PHYSICIAN ASSISTANT

## 2020-09-23 PROCEDURE — G8427 DOCREV CUR MEDS BY ELIG CLIN: HCPCS | Performed by: PHYSICIAN ASSISTANT

## 2020-09-23 RX ORDER — FOLIC ACID/VIT B COMPLEX AND C 0.8 MG
1 TABLET ORAL DAILY
COMMUNITY
End: 2021-08-17

## 2020-09-23 RX ORDER — FLUTICASONE PROPIONATE 110 UG/1
2 AEROSOL, METERED RESPIRATORY (INHALATION) 2 TIMES DAILY
Qty: 1 INHALER | Refills: 12 | Status: SHIPPED | OUTPATIENT
Start: 2020-09-23 | End: 2021-08-17

## 2020-09-23 RX ORDER — CINACALCET 30 MG/1
30 TABLET, FILM COATED ORAL DAILY
COMMUNITY
End: 2020-09-23

## 2020-09-23 RX ORDER — GLUCOSAMINE HCL/CHONDROITIN SU 500-400 MG
1 CAPSULE ORAL
Qty: 150 STRIP | Refills: 3 | Status: SHIPPED | OUTPATIENT
Start: 2020-09-23 | End: 2020-09-28

## 2020-09-23 RX ORDER — ALBUTEROL SULFATE 90 UG/1
2 AEROSOL, METERED RESPIRATORY (INHALATION) EVERY 6 HOURS PRN
COMMUNITY
End: 2021-08-17

## 2020-09-23 ASSESSMENT — PATIENT HEALTH QUESTIONNAIRE - PHQ9
2. FEELING DOWN, DEPRESSED OR HOPELESS: 0
1. LITTLE INTEREST OR PLEASURE IN DOING THINGS: 0
SUM OF ALL RESPONSES TO PHQ QUESTIONS 1-9: 0
SUM OF ALL RESPONSES TO PHQ QUESTIONS 1-9: 0
SUM OF ALL RESPONSES TO PHQ9 QUESTIONS 1 & 2: 0

## 2020-09-23 ASSESSMENT — ENCOUNTER SYMPTOMS
SORE THROAT: 0
EYE PAIN: 0
RHINORRHEA: 0
COUGH: 0
VOMITING: 0
ABDOMINAL PAIN: 0
EYE REDNESS: 0
SHORTNESS OF BREATH: 0
SINUS PRESSURE: 0
DIARRHEA: 0
WHEEZING: 0
NAUSEA: 0

## 2020-09-23 ASSESSMENT — LIFESTYLE VARIABLES: HOW OFTEN DO YOU HAVE A DRINK CONTAINING ALCOHOL: 0

## 2020-09-23 NOTE — PROGRESS NOTES
prn albuterol - less than 3 times a week. Mild baseline shortness of breath, occasional wheezing, mild, occasional chest tightness. Occasional cough at night - 3 times a week. Has been evaluated by Dr. Vinh Cary for reactive airways and pHTN. Patient is being treated for mood disorder and has been compliant with meds which do not cause side effects. Mood is stable. No suicidal ideation. Sleep is normal.    Review of Systems    Prior to Visit Medications    Medication Sig Taking? Authorizing Provider   B Complex-C-Folic Acid (NEPHRO VITAMINS) 0.8 MG TABS Take by mouth Yes Historical Provider, MD   albuterol sulfate HFA (VENTOLIN HFA) 108 (90 Base) MCG/ACT inhaler Inhale 2 puffs into the lungs every 6 hours as needed for Wheezing Yes Historical Provider, MD   sertraline (ZOLOFT) 50 MG tablet TAKE 1 TABLET BY MOUTH DAILY Yes Karen Baca MD   fluticasone (FLOVENT HFA) 110 MCG/ACT inhaler Inhale 2 puffs into the lungs 2 times daily Yes Karen Baca MD   insulin aspart (NOVOLOG FLEXPEN) 100 UNIT/ML injection pen INJECT 18 UNITS SUBCUTANEOUSLY BEFORE BREAKFAST AND DINNER AND 8 UNITS BEFORE LUNCH Yes MARCELO Melara   Misc. Devices [facility]) MISC To use with transport outside of the house. Yes Karen Baca MD   ARIPiprazole (ABILIFY) 5 MG tablet TAKE 1 TABLET BY MOUTH DAILY Yes Karen Baca MD   pregabalin (LYRICA) 75 MG capsule Take 1 capsule by mouth 2 times daily for 90 days. TAKE 1 CAPSULE BY MOUTH TWICE DAILY Yes Karen Baca MD   LANTUS SOLOSTAR 100 UNIT/ML injection pen INJECT 35 UNITS SUBCUTANEOUSLY AT NIGHT Yes Edgar Oneal MD   fluconazole (DIFLUCAN) 150 MG tablet Take one 150 mg tablet today orally and then 72 hours later (3 days) take take the other 150 mg tablet orally.  Yes Libra Coy, APRN - CNP   Magnesium Oxide -Mg Supplement 400 MG CAPS  Yes Historical Provider, MD   ticagrelor (BRILINTA) 90 MG TABS tablet Take 90 mg by mouth 2 times daily Yes Historical Provider, MD   isosorbide mononitrate (IMDUR) 60 MG extended release tablet Take 1 tablet by mouth daily Yes Sarah Mariano MD   ipratropium-albuterol (DUONEB) 0.5-2.5 (3) MG/3ML SOLN nebulizer solution Inhale 3 mLs into the lungs every 4 hours as needed for Shortness of Breath Yes Lisa Guerra MD   nystatin ([address]) 685474 UNIT/GM powder APPLY TO ABDOMINAL FOLDS EVERY 12 HOURS AS NEEDED Yes Myrtle Suarez MD   Blood Glucose Monitoring Suppl (FREESTYLE LITE) CORETTA 1 Device by Does not apply route daily as needed Use freestyle meter to test blood sugar as needed Yes Historical Provider, MD   ranolazine (RANEXA) 500 MG extended release tablet Take 1 tablet by mouth 2 times daily Yes Khalif Mir DO   pantoprazole (PROTONIX) 20 MG tablet TAKE 1 TABLET BY MOUTH DAILY Yes Mallorie Friday, MD   amLODIPine (NORVASC) 10 MG tablet TAKE 1 TABLET BY MOUTH DAILY Yes Mallorie Friday, MD   aspirin 81 MG tablet Take 1 tablet by mouth daily Yes Myrtle Suarez MD   atorvastatin (LIPITOR) 40 MG tablet Take 1 tablet by mouth daily Yes Myrtle Suarez MD   SURE COMFORT PEN NEEDLES 30G X 8 MM MISC USE AS DIRECTED FIVE TIMES A DAY Yes Myrtle Suarez MD   melatonin 3 MG TABS tablet TAKE 1 TABLET BY MOUTH EVERY NIGHT AS NEEDED FOR INSOMNIA Yes Myrtle Suarez MD   nitroGLYCERIN (NITROSTAT) 0.4 MG SL tablet Place 1 tablet under the tongue every 5 minutes as needed for Chest pain Yes Historical Provider, MD   magnesium oxide (MAG-OX) 400 MG tablet Take 400 mg by mouth daily Yes Historical Provider, MD   vitamin B-12 (CYANOCOBALAMIN) 100 MCG tablet TAKE 1 TABLET BY MOUTH DAILY Yes Myrtle Suarez MD   blood glucose monitor strips 1 strip by Other route 4 times daily (before meals and nightly) Pt test 4x daily Dx E11.65.   May substitute for generic or insurance covered product  MARCELO Trivedi       Social History     Tobacco Use    Smoking status: Passive Smoke Exposure - Never Smoker    Smokeless tobacco: Never Used   Substance Use Topics    Alcohol use: No     Alcohol/week: 0.0 standard drinks    Drug use: No        Allergies   Allergen Reactions    Codeine Hives     hives    Oxycontin [Oxycodone Hcl] Hives   ,   Past Medical History:   Diagnosis Date    Anxiety     CAD S/P percutaneous coronary angioplasty ,     stents per dr Maisha Iraheta CKD stage 4 due to type 2 diabetes mellitus ([facility]     Diabetic nephropathy with proteinuria ([facility]     DJD (degenerative joint disease) of knee     Dr Agus Schmitt GERD (gastroesophageal reflux disease)     Hemiparesis, left ([facility] 2013    [facility] Ass[facility] Living ([facility]    History of heart failure     History of seizures     History of type C viral hepatitis     HTN (hypertension)     Hyperlipidemia     Impaired mobility and activities of daily living     Mediastinal lymphadenopathy     Maegan Vines    Metabolic syndrome     Moderate persistent asthma without complication     Neurogenic urinary incontinence 2013    Neuropathy in diabetes ([facility]     Obesity (BMI 30-39. 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 Sanju Chapman      x1 Dr. Kennedy Wyatt, Dr eJtt Recinos CATH LAB PROCEDURE  10/02/2019    HYSTERECTOMY, TOTAL ABDOMINAL      one ovary intact, Dr Ana Paula Ramos, menorrhagia    DE TOTAL KNEE ARTHROPLASTY Left 6/21/2018    LEFT KNEE TOTAL KNEE ARTHROPLASTY, SHAYNA, NERVE BLOCK performed by Cricket Sheikh MD at [address] [place] [address] Right     TOTAL KNEE ARTHROPLASTY  05/19/16    Dr Moiz Davis TUNNELED [address] Right 07/01/2020    tunneled HD catheter per Dr Adolfo Echols   ,   Social History     Tobacco Use    Smoking status: Passive Smoke Exposure - Never Smoker    Smokeless tobacco: Never Used   Substance Use Topics    Alcohol use: No     Alcohol/week: 0.0 standard drinks    Drug use: No   ,   Family History   Problem Relation Age of Onset    Cancer Mother 76        survived   Carlos Manuel Wills Hypertension Father     Diabetes Sister     Mental Illness Sister        PHYSICAL EXAMINATION:  [ INSTRUCTIONS:  \"[x]\" Indicates a positive item  \"[]\" Indicates a negative item  -- DELETE ALL ITEMS NOT EXAMINED]  Vital Signs: unavailable    Patient appears to be alert and oriented to person, place, time, situation and is in no acute distress. Respiratory effort appears normal. Mood appears stable and speech and thought are grossly normal.    ASSESSMENT/PLAN:  Creta Gilford was seen today for coronary artery disease and discuss medications. Diagnoses and all orders for this visit:    Moderate persistent asthma without complication  Comments:  Flovent and as needed albuterol and amlodipine  Orders:  -     fluticasone (FLOVENT HFA) 110 MCG/ACT inhaler; Inhale 2 puffs into the lungs 2 times daily    Schizophrenia, paranoid, chronic  Comments:  Stable and well-controlled. Continue Zoloft and Abilify. Orders:  -     sertraline (ZOLOFT) 50 MG tablet; TAKE 1 TABLET BY MOUTH DAILY    Essential hypertension  Comments:  Stable and well-controlled on Imdur    Pulmonary hypertension ([facility]  Comments:  Stable and well-controlled. Followed by pulmonology.     Angina, class II ([facility]  Comments:  Stable and well-controlled on Ranexa. Uncontrolled type 2 diabetes mellitus with hyperglycemia ([facility]  Comments: Worse, fair control. Recent Lantus increase. Followed by endocrinology. Diabetic nephropathy with proteinuria (HCC)  Comments: Followed by nephrology. Stable, poor control. Chronic painful diabetic neuropathy (HCC)  Comments:  Stable and relatively well-controlled on pregabalin    Other screening mammogram  -     NARESH DIGITAL SCREEN W OR WO CAD BILATERAL; Future          Return in about 3 months (around 12/23/2020) for DM. Marcelino Villafana is a 56 y.o. female being evaluated by a Virtual Visit (telephonic visit) encounter to address concerns as mentioned above. A caregiver was present when appropriate. Due to this being a TeleHealth encounter ([facility]), evaluation of the following organ systems was limited: Vitals/Constitutional/EENT/Resp/CV/GI//MS/Neuro/Skin/Heme-Lymph-Imm. Pursuant to the emergency declaration under the [address] [place] [address] and the [employer] Act, this Virtual Visit was conducted with patient's (and/or legal guardian's) consent, to reduce the patient's risk of exposure to COVID-19 and provide necessary medical care. The patient (and/or legal guardian) has also been advised to contact this office for worsening conditions or problems, and seek emergency medical treatment and/or call 911 if deemed necessary. Services were provided through a telephonic synchronous discussion virtually to substitute for in-person clinic visit. Patient and provider were located at their individual homes. --Monika Cooper MD on 9/23/2020 at 5:28 PM    An electronic signature was used to authenticate this note.

## 2020-09-23 NOTE — PATIENT INSTRUCTIONS
Endocrinology-diabetes    1. Check your blood sugars 4 times a day, before meals and at night  2. Document these numbers and a blood glucose log and bring them with you to your follow-up appointment. 3. Do not take your mealtime insulin if your blood sugars less than 140  4. Call our office if you have blood sugars less than 80 or greater then 250 on two or more occasions  5. Call our office if you have any questions regarding your blood sugars or insulin dosing regiment  6. Signs of low blood sugar include sweating , heart racing, dizziness and weakness. Check your blood sugar if you have any of these symptoms. Medications  1. Increase Lantus 40 units at night   2. Novolog 12 units before meals   3.  PLUS Sliding scale 150-200 2 unit, 200-250 4 units, 250 or higher 6 units

## 2020-09-23 NOTE — PATIENT INSTRUCTIONS
Personalized Preventive Plan for Carmelo Aceves - 9/23/2020  Medicare offers a range of preventive health benefits. Some of the tests and screenings are paid in full while other may be subject to a deductible, co-insurance, and/or copay. Some of these benefits include a comprehensive review of your medical history including lifestyle, illnesses that may run in your family, and various assessments and screenings as appropriate. After reviewing your medical record and screening and assessments performed today your provider may have ordered immunizations, labs, imaging, and/or referrals for you. A list of these orders (if applicable) as well as your Preventive Care list are included within your After Visit Summary for your review. Other Preventive Recommendations:    · A preventive eye exam performed by an eye specialist is recommended every 1-2 years to screen for glaucoma; cataracts, macular degeneration, and other eye disorders. · A preventive dental visit is recommended every 6 months. · Try to get at least 150 minutes of exercise per week or 10,000 steps per day on a pedometer . · Order or download the FREE \"Exercise & Physical Activity: Your Everyday Guide\" from [employer]. Call 2-859.838.2085 or search [employer] online. · You need 0640-5825 mg of calcium and 5806-2997 IU of vitamin D per day. It is possible to meet your calcium requirement with diet alone, but a vitamin D supplement is usually necessary to meet this goal.  · When exposed to the sun, use a sunscreen that protects against both UVA and UVB radiation with an SPF of 30 or greater. Reapply every 2 to 3 hours or after sweating, drying off with a towel, or swimming. · Always wear a seat belt when traveling in a car. Always wear a helmet when riding a bicycle or motorcycle. Heart-Healthy Diet   Sodium, Fat, and Cholesterol Controlled Diet       What Is a Heart Healthy Diet?    A heart-healthy diet is one that limits sodium , certain types of fat , and cholesterol . This type of diet is recommended for:   People with any form of cardiovascular disease (eg, coronary heart disease , peripheral vascular disease , previous heart attack , previous stroke )   People with risk factors for cardiovascular disease, such as high blood pressure , high cholesterol , or diabetes   Anyone who wants to lower their risk of developing cardiovascular disease   Sodium    Sodium is a mineral found in many foods. In general, most people consume much more sodium than they need. Diets high in sodium can increase blood pressure and lead to edema (water retention). On a heart-healthy diet, you should consume no more than 2,300 mg (milligrams) of sodium per dayabout the amount in one teaspoon of table salt. The foods highest in sodium include table salt (about 50% sodium), processed foods, convenience foods, and preserved foods. Cholesterol    Cholesterol is a fat-like, waxy substance in your blood. Our bodies make some cholesterol. It is also found in animal products, with the highest amounts in fatty meat, egg yolks, whole milk, cheese, shellfish, and organ meats. On a heart-healthy diet, you should limit your cholesterol intake to less than 200 mg per day. It is normal and important to have some cholesterol in your bloodstream. But too much cholesterol can cause plaque to build up within your arteries, which can eventually lead to a heart attack or stroke. The two types of cholesterol that are most commonly referred to are:   Low-density lipoprotein (LDL) cholesterol  Also known as bad cholesterol, this is the cholesterol that tends to build up along your arteries. Bad cholesterol levels are increased by eating fats that are saturated or hydrogenated. Optimal level of this cholesterol is less than 100. Over 130 starts to get risky for heart disease.    High-density lipoprotein (HDL) cholesterol  Also known as good cholesterol, this type of cholesterol actually carries cholesterol away from your arteries and may, therefore, help lower your risk of having a heart attack. You want this level to be high (ideally greater than 60). It is a risk to have a level less than 40. You can raise this good cholesterol by eating olive oil, canola oil, avocados, or nuts. Exercise raises this level, too. Fat    Fat is calorie dense and packs a lot of calories into a small amount of food. Even though fats should be limited due to their high calorie content, not all fats are bad. In fact, some fats are quite healthful. Fat can be broken down into four main types. The good-for-you fats are:   Monounsaturated fat  found in oils such as olive and canola, avocados, and nuts and natural nut butters; can decrease cholesterol levels, while keeping levels of HDL cholesterol high   Polyunsaturated fat  found in oils such as safflower, sunflower, soybean, corn, and sesame; can decrease total cholesterol and LDL cholesterol   Omega-3 fatty acids  particularly those found in fatty fish (such as salmon, trout, tuna, mackerel, herring, and sardines); can decrease risk of arrhythmias, decrease triglyceride levels, and slightly lower blood pressure   The fats that you want to limit are:   Saturated fat  found in animal products, many fast foods, and a few vegetables; increases total blood cholesterol, including LDL levels   Animal fats that are saturated include: butter, lard, whole-milk dairy products, meat fat, and poultry skin   Vegetable fats that are saturated include: hydrogenated shortening, palm oil, coconut oil, cocoa butter   Hydrogenated or trans fat  found in margarine and vegetable shortening, most shelf stable snack foods, and fried foods; increases LDL and decreases HDL     It is generally recommended that you limit your total fat for the day to less than 30% of your total calories.  If you follow an 1800-calorie heart healthy diet, for example, this would mean 60 grams of fat or less per day. Saturated fat and trans fat in your diet raises your blood cholesterol the most, much more than dietary cholesterol does. For this reason, on a heart-healthy diet, less than 7% of your calories should come from saturated fat and ideally 0% from trans fat. On an 1800-calorie diet, this translates into less than 14 grams of saturated fat per day, leaving 46 grams of fat to come from mono- and polyunsaturated fats.    Food Choices on a Heart Healthy Diet   Food Category   Foods Recommended   Foods to Avoid   Grains   Breads and rolls without salted tops Most dry and cooked cereals Unsalted crackers and breadsticks Low-sodium or homemade breadcrumbs or stuffing All rice and pastas   Breads, rolls, and crackers with salted tops High-fat baked goods (eg, muffins, donuts, pastries) Quick breads, self-rising flour, and biscuit mixes Regular bread crumbs Instant hot cereals Commercially prepared rice, pasta, or stuffing mixes   Vegetables   Most fresh, frozen, and low-sodium canned vegetables Low-sodium and salt-free vegetable juices Canned vegetables if unsalted or rinsed   Regular canned vegetables and juices, including sauerkraut and pickled vegetables Frozen vegetables with sauces Commercially prepared potato and vegetable mixes   Fruits   Most fresh, frozen, and canned fruits All fruit juices   Fruits processed with salt or sodium   Milk   Nonfat or low-fat (1%) milk Nonfat or low-fat yogurt Cottage cheese, low-fat ricotta, cheeses labeled as low-fat and low-sodium   Whole milk Reduced-fat (2%) milk Malted and chocolate milk Full fat yogurt Most cheeses (unless low-fat and low salt) Buttermilk (no more than 1 cup per week)   Meats and Beans   Lean cuts of fresh or frozen beef, veal, lamb, or pork (look for the word loin) Fresh or frozen poultry without the skin Fresh or frozen fish and some shellfish Egg whites and egg substitutes (Limit whole eggs to three per week) Tofu Nuts or seeds (unsalted, dry-roasted), low-sodium peanut butter Dried peas, beans, and lentils   Any smoked, cured, salted, or canned meat, fish, or poultry (including mcmanus, chipped beef, cold cuts, hot dogs, sausages, sardines, and anchovies) Poultry skins Breaded and/or fried fish or meats Canned peas, beans, and lentils Salted nuts   Fats and Oils   Olive oil and canola oil Low-sodium, low-fat salad dressings and mayonnaise   Butter, margarine, coconut and palm oils, mcmanus fat   Snacks, Sweets, and Condiments   Low-sodium or unsalted versions of broths, soups, soy sauce, and condiments Pepper, herbs, and spices; vinegar, lemon, or lime juice Low-fat frozen desserts (yogurt, sherbet, fruit bars) Sugar, cocoa powder, honey, syrup, jam, and preserves Low-fat, trans-fat free cookies, cakes, and pies Alcon and animal crackers, fig bars, pito snaps   High-fat desserts Broth, soups, gravies, and sauces, made from instant mixes or other high-sodium ingredients Salted snack foods Canned olives Meat tenderizers, seasoning salt, and most flavored vinegars   Beverages   Low-sodium carbonated beverages Tea and coffee in moderation Soy milk   Commercially softened water   Suggestions   Make whole grains, fruits, and vegetables the base of your diet. Choose heart-healthy fats such as canola, olive, and flaxseed oil, and foods high in heart-healthy fats, such as nuts, seeds, soybeans, tofu, and fish. Eat fish at least twice per week; the fish highest in omega-3 fatty acids and lowest in mercury include salmon, herring, mackerel, sardines, and canned chunk light tuna. If you eat fish less than twice per week or have high triglycerides, talk to your doctor about taking fish oil supplements. Read food labels.    For products low in fat and cholesterol, look for fat free, low-fat, cholesterol free, saturated fat free, and trans fat freeAlso scan the Nutrition Facts Label, which lists saturated fat, trans fat, 30 or greater is considered to be obese  A waist circumference greater than 35 inches (88 cm) in women and 40 inches (102 cm) in men increases the risk of obesity-related complications, such as heart disease and diabetes. People who are obese and who have a larger waist size may need more aggressive weight loss treatment than others. Talk to your doctor or nurse for advice. Types of treatment -- Based on your measurements and your medical history, your doctor or nurse can determine what combination of weight loss treatments would work best for you. Treatments may include changes in lifestyle, exercise, dieting, and, in some cases, weight loss medicines or weight loss surgery. Weight loss surgery, also called bariatric surgery, is reserved for people with severe obesity who have not responded to other weight loss treatments. SETTING A WEIGHT LOSS GOAL -- It is important to set a realistic weight loss goal. Your first goal should be to avoid gaining more weight and staying at your current weight (or within 5 percent). Many people have a \"dream\" weight that is difficult or impossible to achieve. People at high risk of developing diabetes who are able to lose 5 percent of their body weight and maintain this weight will reduce their risk of developing diabetes by about 50 percent and reduce their blood pressure. This is a success. Losing more than 15 percent of your body weight and staying at this weight is an extremely good result, even if you never reach your \"dream\" or \"ideal\" weight. LIFESTYLE CHANGES -- Programs that help you to change your lifestyle are usually run by psychologists or other professionals. The goals of lifestyle changes are to help you change your eating habits, become more active, and be more aware of how much you eat and exercise, helping you to make healthier choices. This type of treatment can be broken down into three steps:   The triggers that make you want to eat   Eating   What happens after you eat  Triggers to eat -- Determining what triggers you to eat involves figuring out what foods you eat and where and when you eat. To figure out what triggers you to eat, keep a record for a few days of everything you eat, the places where you eat, how often you eat, and the emotions you were feeling when you ate. For some people, the trigger is related to a certain time of day or night. For others, the trigger is related to a certain place, like sitting at a desk working. Eating -- You can change your eating habits by breaking the chain of events between the trigger for eating and eating itself. There are many ways to do this. For instance, you can:  Limit where you eat to a few places (eg, dining room)   Restrict the number of utensils (eg, only a fork) used for eating   Drink a sip of water between each bite   Chew your food a certain number of times   Get up and stop eating every few minutes  What happens after you eat -- Rewarding yourself for good eating behaviors can help you to develop better habits. This is not a reward for weight loss; instead, it is a reward for changing unhealthy behaviors. Do not use food as a reward. Some people find money, clothing, or personal care (eg, a hair cut, manicure, or massage) to be effective rewards. Treat yourself immediately after making better eating choices to reinforce the value of the good behavior. You need to have clear behavior goals, and you must have a time frame for reaching your goals. Reward small changes along the way to your final goal.  Other factors that contribute to successful weight loss -- Changing your behavior involves more than just changing unhealthy eating habits; it also involves finding people around you to support your weight loss, reducing stress, and learning to be strong when tempted by food. Establish a \"lamar\" system -- Having a friend or family member available to provide support and reinforce good behavior is very helpful. The support person needs to understand your goals. Learn to be strong -- Learning to be strong when tempted by food is an important part of losing weight. As an example, you will need to learn how to say \"no\" and continue to say no when urged to eat at parties and social gatherings. Develop strategies for events before you go, such as eating before you go or taking low-calorie snacks and drinks with you. Develop a support system -- Having a support system is helpful when losing weight. This is why many [place] cn groups are successful. Family support is also essential; if your family does not support your efforts to lose weight, this can slow your progress or even keep you from losing weight. Positive thinking -- People often have conversations with themselves in their head; these conversations can be positive or negative. If you eat a piece of cake that was not planned, you may respond by thinking, \"Oh, you stupid idiot, you've blown your diet! \" and as a result, you may eat more cake. A positive thought for the same event could be, \"Well, I ate cake when it was not on my plan. Now I should do something to get back on track. \" A positive approach is much more likely to be successful than a negative one. Reduce stress -- Although stress is a part of everyday life, it can trigger uncontrolled eating in some people. It is important to find a way to get through these difficult times without eating or by eating low-calorie food, like raw vegetables. It may be helpful to imagine a relaxing place that allows you to temporarily escape from stress. With deep breaths and closed eyes, you can imagine this relaxing place for a few minutes. Self-help programs -- Self-help programs like [employer] Watchers®, Overeaters Anonymous®, and Take Off Patrick (TOPS)© work for some people.  As with all weight loss programs, you are most likely to be successful with these plans if you make long-term changes in how you eat.  CHOOSING A DIET -- A calorie is a unit of energy found in food. Your body needs calories to function. The goal of any diet is to burn up more calories than you eat. How quickly you lose weight depends upon several factors, such as your age, gender, and starting weight. Older people have a slower metabolism than young people, so they lose weight more slowly. Men lose more weight than women of similar height and weight when dieting because they use more energy. People who are extremely overweight lose weight more quickly than those who are only mildly overweight. Try not to drink alcohol or drinks with added sugar, and most sweets (candy, cakes, cookies), since they rarely contain important nutrients. Portion-controlled diets -- One simple way to diet is to buy packaged foods, like frozen low-calorie meals or meal-replacement canned drinks. A typical meal plan for one day may include:  A meal-replacement drink or breakfast bar for breakfast   A meal-replacement drink or a frozen low-calorie (250 to 350 calories) meal for lunch   A frozen low-calorie meal or other prepackaged, calorie-controlled meal, along with extra vegetables for dinner  This would give you 1000 to 1500 calories per day. Low-fat diet -- To reduce the amount of fat in your diet, you can:  Eat low-fat foods. Low-fat foods are those that contain less than 30 percent of calories from fat. Fat is listed on the food facts label (figure 1). Count fat grams. For a 1500 calorie diet, this would mean about 45 g or fewer of fat per day. Low-carbohydrate diet -- Low- and very-low-carbohydrate diets (eg, Atkins diet, [employer]) have become popular ways to lose weight quickly.   With a very-low-carbohydrate diet, you eat between 0 and 60 grams of carbohydrates per day (a standard diet contains 200 to 300 grams of carbohydrates)   With a low-carbohydrate diet, you eat between 60 and 130 grams of carbohydrates per day  Carbohydrates are found in fruits, vegetables, and grains (including breads, rice, pasta, and cereal), alcoholic beverages, and in dairy products. Meat and fish do not contain carbohydrates. Side effects of very-low-carbohydrate diets can include constipation, headache, bad breath, muscle cramps, diarrhea, and weakness. Mediterranean diet -- The term \"Mediterranean diet\" refers to a way of eating that is common in [place] regions around the [facility]. Although there is some variation in Mediterranean diets, there are some similarities. Most Mediterranean diets include:  A high level of monounsaturated fats (from olive or canola oil, walnuts, pecans, almonds) and a low level of saturated fats (from butter)   A high amount of vegetables, fruits, legumes, and grains (7 to 10 servings of fruits and vegetables per day)   A moderate amount of milk and dairy products, mostly in the form of cheese. Use low-fat dairy products (skim milk, fat-free yogurt, low-fat cheese). A relatively low amount of red meat and meat products. Substitute fish or poultry for red meat. For those who drink alcohol, a modest amount (mainly as red wine) may help to protect against cardiovascular disease. A modest amount is up to one (4 ounce) glass per day for women and up to two glasses per day for men. Which diet is best? -- Studies have compared different diets, including:  Very-low-carbohydrate (Atkins)   Macronutrient balance controlling glycemic load (Zone®)   Reduced-calorie (Weight Watchers®)   Very-low-fat (Ornish)  No one diet is \"best\" for weight loss. Any diet will help you to lose weight if you stick with the diet. Therefore, it is important to choose a diet that includes foods you like. Fad diets -- Fad diets often promise quick weight loss (more than 1 to 2 pounds per week) and may claim that you do not need to exercise or give up favorite foods. Some fad diets cost a lot of money, because you have to pay for seminars or pills. Fad diets generally lack any scientific evidence that they are safe and effective, but instead rely on \"before\" and \"after\" photos or testimonials. Diets that sound too good to be true usually are. These plans are a waste of time and money and are not recommended. A doctor, nurse, or nutritionist can help you find a safe and effective way to lose weight and keep it off. WEIGHT LOSS MEDICINES -- Taking a weight loss medicine may be helpful when used in combination with diet, exercise, and lifestyle changes. However, it is important to understand the risks and benefits of these medicines. It is also important to be realistic about your goal weight using a weight loss medicine; you may not reach your \"dream\" weight, but you may be able to reduce your risk of diabetes or heart disease. Weight loss medicines may be recommended for people who have not been able to lose weight with diet and exercise who have a:  BMI of 30 or more    BMI between 27 and 29.9 and have other medical problems, such as diabetes, high cholesterol, or high blood pressure  Two weight loss medicines are approved in the [place] for long-term use. These are sibutramine and orlistat. Other weight loss medicines (phentermine, diethylpropion) are available but are only approved for short-term use (up to 12 weeks). Sibutramine -- Sibutramine (Meridia®, Reductil®) is a medicine that reduces your appetite. In people who take the medicine for one year, the average weight loss is 10 percent of the initial body weight (5 percent more than those who took a placebo treatment). Side effects of sibutramine include insomnia, dry mouth, and constipation. Increases in blood pressure can occur. Therefore, blood pressure is usually monitored during treatment. There is no evidence that sibutramine causes heart or lung problems (like dexfenfluramine and fenfluramine (Phen/Fen)).  However, experts agree that sibutramine should not used by people with coronary heart disease, heart failure, uncontrolled hypertension, stroke, irregular heart rhythms, or peripheral vascular disease (poor circulation in the legs). Orlistat -- Orlistat (Xenical® 120 mg capsules) is a medicine that reduces the amount of fat your body absorbs from the foods you eat. A lower-dose version is now available without a prescription (Jaciel® 60 mg capsules) in many countries, including the [place]. The medicine is recommended three times per day, taken with a meal; you can skip a dose if you skip a meal or if the meal contains no fat. After one year of treatment with orlistat, the average weight loss is approximately 8 to 10 percent of initial body weight (4 percent more than in those who took a placebo). Cholesterol levels often improve, and blood pressure sometimes falls. In people with diabetes, orlistat may help control blood sugar levels. Side effects occur in 15 to 10 percent of people and may include stomach cramps, gas, diarrhea, leakage of stool, or oily stools. These problems are more likely when you take orlistat with a high-fat meal (if more than 30 percent of calories in the meal are from fat). Side effects usually improve as you learn to avoid high-fat foods. Severe liver injury has been reported rarely in patients taking orlistat, but it is not known if orlistat caused the liver problems. Diet supplements -- Diet supplements are widely used by people who are trying to lose weight, although the safety and efficacy of these supplements are often unproven. A few of the more common diet supplements are discussed below; none of these are recommended because they have not been studied carefully, and there is no proof they are safe or effective. Chitosan and wheat dextrin are ineffective for weight loss, and their use is not recommended. Ephedra, a compound related to ephedrine, is no longer available in the [place] due to safety concerns.  Many nonprescription diet pills need to work with these providers for several weeks or months before surgery. The nutritionist will explain what and how much you will be able to eat after surgery. You may also need to lose a small amount of weight before surgery. The mental health specialist will help you to cope with stress and other factors that can make it harder to lose weight or trigger you to eat   The medical doctor will determine whether you need other tests, counseling, or treatment before surgery. He or she might also help you begin a medical weight loss program so that you can lose some weight before surgery. The bariatric surgeon will meet with you to discuss the surgeries available to treat obesity. He or she will also make sure you are a good candidate for surgery. TYPES OF WEIGHT LOSS SURGERY -- There are several types of weight loss surgeries, the most common being lap banding, gastric bypass, and gastric sleeve. Lap banding -- Laparoscopic adjustable gastric banding (LAGB), or lap banding, is a surgery that uses an adjustable band around the opening to the stomach (figure 1). This reduces the amount of food that you can eat at one time. Lap banding is done through small incisions, with a laparoscope. The band can be adjusted after surgery, allowing you to eat more or less food. Adjustments to the size and tightness of the band are made by using a needle to add or remove fluid from a port (a small container under the skin that is connected to the band). Adding fluid to the band makes it tighter which restricts the amount of food you can eat and may help you to lose more weight. Lap banding is a popular choice because it is relatively simple to perform, can be adjusted or removed, and has a low risk of serious complications immediately after surgery. However, weight loss with the lap band depends on your ability to follow the program closely.   You will need to prepare nutritious meals that \"work with\" the band, not against it. For example, the lap band will not work well if you eat or drink a large amount of liquid calories (like ice cream). The band will not help you to feel full when you eat/drink liquid calories. Weight loss ranges from 45 to 75 percent after two years. As an example, a person who is 120 pounds overweight could expect to lose approximately 54 to 90 pounds in the two years after lap banding. Gastric bypass -- Jane-en-Y gastric bypass, also called gastric bypass, helps you to lose weight by reducing the amount of food you can eat and reducing the number of calories and nutrients you absorb from the food you eat. To perform gastric bypass, a surgeon creates a small stomach pouch by dividing the stomach and attaching it to the small intestine. This helps you to lose weight in two ways: The smaller stomach can hold less food than before surgery. This causes you to feel full after eating a very small amount of food or liquid. Over time, the pouch might stretch, allowing you to eat more food. The body absorbs fewer calories, since food bypasses most of the stomach as well as the upper small intestine. This new arrangement seems to decrease your appetite and change how you break down foods by changing the release of various hormones. Gastric bypass can be performed as open surgery (through an incision on the abdomen) or laparoscopically, which uses smaller incisions and smaller instruments. Both the laparoscopic and open techniques have risks and benefits. You and your surgeon should work together to decide which surgery, if any, is right for you. Gastric bypass has a high success rate, and people lose an average of 62 to 68 percent of their excess body weight in the first year. Weight loss typically levels off after one to two years, with an overall excess weight loss between 50 and 75 percent. For a person who is 120 pounds overweight, an average of 60 to 90 pounds of weight loss would be expected.   Gastric reduce the risk of illness or death associated with obesity. Weight loss surgery can also help you to feel and look better, reduce the amount of money you spend on medicines, and cut down on sick days. As an example, weight loss surgery can improve health problems related to obesity (diabetes, high blood pressure, high cholesterol, sleep apnea) to the point that you need less or no medicine. Finally, weight loss surgery might reduce your risk of developing heart disease, cancer, and certain infections. AFTER WEIGHT LOSS SURGERY -- You will need to stay in the hospital until your team feels that it is safe for you to leave (on average, one to three days). Do not drive if you are taking prescription pain medicine. Begin exercising as soon as possible once you have healed; most weight loss centers will design an exercise program for you. Once you are home, it is important to eat and drink exactly what your doctor and dietitian recommend. You will see your doctor, nurse, and dietitian on a regular basis after surgery to monitor your health, diet, and weight loss. You will be able to slowly increase how much you eat over time, although it will always be important to:  Eat small, frequent meals and not skip meals   Chew your food slowly and completely   Avoid eating while \"distracted\" (such as eating while watching TV)   Stop eating when you feel full   Drink liquids at least 30 minutes before or after eating   Avoid foods high in fat or sugar   Take vitamin supplements, as recommended  It can take several months to learn to listen to your body so that you know when you are hungry and when you are full. You may dislike foods you previously loved, and you may begin to prefer new foods. This can be a frustrating process for some people, so talk to your dietitian if you are having trouble. It usually takes between one and two years to lose weight after surgery.  After reaching their goal weight, some people have plastic surgery (called \"body contouring\") to remove excess skin from the body, particularly in the abdominal area. Before you decide to have weight loss surgery, you must commit to staying healthy for life. This includes following up with your healthcare team, exercising most days of the week, and eating a sensible diet every day. It can be difficult to develop new eating and exercise habits after weight loss surgery, and you will have to work hard to stick to your goals. Recovering from surgery and losing weight can be stressful and emotional, and it is important to have the support of family and friends. Working with a , therapist, or support group can help you through the ups and downs. WHERE TO GET MORE INFORMATION -- Your healthcare provider is the best source of information for questions and concerns related to your medical problem. This article will be updated as needed every four months on our Web site ([email or web address])  ·     High-Fiber Diet     What Is Fiber? Dietary fiber is a form of carbohydrate found in plants that cannot be digested by humans. All plants contain fiber, including fruits, vegetables, grains, and legumes. Fiber is often classified into two categories: soluble and insoluble. Soluble fiber draws water into the bowel and can help slow digestion. Examples of foods that are high in soluble fiber include oatmeal, oat bran, barley, legumes (eg, beans and peas), apples, and strawberries. Insoluble fiber speeds digestion and can add bulk to the stool. Examples of foods that are high in insoluble fiber include whole-wheat products, wheat bran, cauliflower, green beans, and potatoes. Why Follow a High-Fiber Diet? A high-fiber diet is often recommended to prevent and treat constipation , hemorrhoids , diverticulitis , and irritable bowel syndrome .  Eating a high-fiber diet can also help improve your cholesterol levels, lower your risk of coronary heart disease , reduce your risk of type 2 diabetes , and lower your weight. For people with type 1 or 2 diabetes, a high-fiber diet can also help stabilize blood sugar levels. How Much Fiber Should I Eat? A high-fiber diet should contain  20-35 grams  of fiber a day. This is actually the amount recommended for the general adult population; however, most Americans eat only 15 grams of fiber per day. Digestion of Fiber   Eating a higher fiber diet than usual can take some getting used to by your body's digestive system. To avoid the side effects of sudden increases in dietary fiber (eg, gas, cramping, bloating, and diarrhea), increase fiber gradually and be sure to drink plenty of fluids every day. Tips for Increasing Fiber Intake   Whenever possible, choose whole grains over refined grains (eg, brown rice instead of white rice, whole-wheat bread instead of white bread). Include a variety of grains in your diet, such as wheat, rye, barley, oats, quinoa, and bulgur. Eat more vegetarian-based meals. Here are some ideas: black bean burgers, eggplant lasagna, and veggie tofu stir-york. Choose high-fiber snacks, such as fruits, popcorn, whole-grain crackers, and nuts. Make whole-grain cereal or whole-grain toast part of your daily breakfast regime. When eating out, whether ordering a sandwich or dinner, ask for extra vegetables. When baking, replace part of the white flour with whole-wheat flour. Whole-wheat flour is particularly easy to incorporate into a recipe. High-Fiber Diet Eating Guide   Food Category   Foods Recommended   Notes   Grains   Whole-grain breads, muffins, bagels, or doni bread Rye bread Whole-wheat crackers or crisp breads Whole-grain or bran cereals Oatmeal, oat bran, or grits Wheat germ Whole-wheat pasta and brown rice   Read the ingredients list on food labels. Look for products that list \"whole\" as the first ingredient (eg, whole-wheat, whole oats).  Choose cereals with at least 2 grams of fiber per serving. Vegetables   All vegetables, especially asparagus, bean sprouts, broccoli, [place] sprouts, cabbage, carrots, cauliflower, celery, corn, greens, green beans, green pepper, onions, peas, potatoes (with skin), snow peas, spinach, squash, sweet potatoes, tomatoes, zucchini   For maximum fiber intake, eat the peels of fruits and vegetablesjust be sure to wash them well first.   Fruits   All fruits, especially apples, berries, grapefruits, mangoes, nectarines, oranges, peaches, pears, dried fruits (figs, dates, prunes, raisins)   Choose raw fruits and vegetables over juice, cooked, or cannedraw fruit has more fiber. Dried fruit is also a good source of fiber. Milk   With the exception of yogurt containing inulin (a type of fiber), dairy foods provide little fiber. Add more fiber by topping your yogurt or cottage cheese with fresh fruit, whole grain or bran cereals, nuts, or seeds. Meats and Beans   All beans and peas, especially Garbanzo beans, kidney beans, lentils, lima beans, split peas, and mcclellan beans All nuts and seeds, especially almonds, peanuts, [place] nuts, cashews, peanut butter, walnuts, sesame and sunflower seeds All meat, poultry, fish, and eggs   Increase fiber in meat dishes by adding mcclellan beans, kidney beans, black-eyed peas, bran, or oatmeal. If you are following a low-fat diet, use nuts and seeds only in moderation. Fats and Oils   All in moderation   Fats and oils do not provide fiber   Snacks, Sweets, and Condiments   Fruit Nuts Popcorn, whole-wheat pretzels, or trail mix made with dried fruits, nuts, and seeds Cakes, breads, and cookies made with oatmeal or whole-wheat flour   Most snack foods do not provide much fiber. Choose snacks with at least 2 grams of fiber per serving. Last Reviewed: March 2011 Ra Aguilar MS, MPH, RD   Updated: 3/29/2011   ·   Smoking Cessation  This document explains the best ways for you to quit smoking and new treatments to help.  It lists new medicines that can double or triple your chances of quitting and quitting for good. It also considers ways to avoid relapses and concerns you may have about quitting, including weight gain. NICOTINE: A POWERFUL ADDICTION  If you have tried to quit smoking, you know how hard it can be. It is hard because nicotine is a very addictive drug. For some people, it can be as addictive as heroin or cocaine. Usually, people make 2 or 3 tries, or more, before finally being able to quit. Each time you try to quit, you can learn about what helps and what hurts. Quitting takes hard work and a lot of effort, but you can quit smoking. QUITTING SMOKING IS ONE OF THE MOST IMPORTANT THINGS YOU WILL EVER DO. You will live longer, feel better, and live better. The impact on your body of quitting smoking is felt almost immediately:  Within 20 minutes, blood pressure decreases. Pulse returns to its normal level. After 8 hours, carbon monoxide levels in the blood return to normal. Oxygen level increases. After 24 hours, chance of heart attack starts to decrease. Breath, hair, and body stop smelling like smoke. After 48 hours, damaged nerve endings begin to recover. Sense of taste and smell improve. After 72 hours, the body is virtually free of nicotine. Bronchial tubes relax and breathing becomes easier. After 2 to 12 weeks, lungs can hold more air. Exercise becomes easier and circulation improves. Quitting will reduce your risk of having a heart attack, stroke, cancer, or lung disease:  After 1 year, the risk of coronary heart disease is cut in half. After 5 years, the risk of stroke falls to the same as a nonsmoker. After 10 years, the risk of lung cancer is cut in half and the risk of other cancers decreases significantly. After 15 years, the risk of coronary heart disease drops, usually to the level of a nonsmoker. If you are pregnant, quitting smoking will improve your chances of having a healthy baby.   The people you live with, especially your children, will be healthier. You will have extra money to spend on things other than cigarettes. FIVE KEYS TO QUITTING  Studies have shown that these 5 steps will help you quit smoking and quit for good. You have the best chances of quitting if you use them together:  Get ready. Get support and encouragement. Learn new skills and behaviors. Get medicine to reduce your nicotine addiction and use it correctly. Be prepared for relapse or difficult situations. Be determined to continue trying to quit, even if you do not succeed at first.  1. GET READY  Set a quit date. Change your environment. Get rid of ALL cigarettes, ashtrays, matches, and lighters in your home, car, and place of work. Do not let people smoke in your home. Review your past attempts to quit. Think about what worked and what did not. Once you quit, do not smoke. NOT EVEN A PUFF! 2. GET SUPPORT AND ENCOURAGEMENT  Studies have shown that you have a better chance of being successful if you have help. You can get support in many ways. Tell your family, friends, and coworkers that you are going to quit and need their support. Ask them not to smoke around you. Talk to your caregivers (doctor, dentist, nurse, pharmacist, psychologist, and/or smoking counselor). Get individual, group, or telephone counseling and support. The more counseling you have, the better your chances are of quitting. Programs are available at [facility] and health centers. Call your local health department for information about programs in your area. Spiritual beliefs and practices may help some smokers quit. Quit meters are small computer programs online or downloadable that keep track of quit statistics, such as amount of \"quit-time,\" cigarettes not smoked, and money saved.   Many smokers find one or more of the many self-help books available useful in helping them quit and stay off tobacco.  3. LEARN NEW SKILLS AND BEHAVIORS  Try to distract yourself from urges to smoke. Talk to someone, go for a walk, or occupy your time with a task. When you first try to quit, change your routine. Take a different route to work. Drink tea instead of coffee. Eat breakfast in a different place. Do something to reduce your stress. Take a hot bath, exercise, or read a book. Plan something enjoyable to do every day. Reward yourself for not smoking. Explore interactive web-based programs that specialize in helping you quit. 4. GET MEDICINE AND USE IT CORRECTLY  Medicines can help you stop smoking and decrease the urge to smoke. Combining medicine with the above behavioral methods and support can quadruple your chances of successfully quitting smoking. The [place] Food and Drug Administration (FDA) has approved 7 medicines to help you quit smoking. These medicines fall into 3 categories. Nicotine replacement therapy (delivers nicotine to your body without the negative effects and risks of smoking):  Nicotine gum: Available over-the-counter. Nicotine lozenges: Available over-the-counter. Nicotine inhaler: Available by prescription. Nicotine nasal spray: Available by prescription. Nicotine skin patches (transdermal): Available by prescription and over-the-counter. Antidepressant medicine (helps people abstain from smoking, but how this works is unknown): Bupropion sustained-release (SR) tablets: Available by prescription. Nicotinic receptor partial agonist (simulates the effect of nicotine in your brain):  Varenicline tartrate tablets: Available by prescription. Ask your caregiver for advice about which medicines to use and how to use them. Carefully read the information on the package. Everyone who is trying to quit may benefit from using a medicine.  If you are pregnant or trying to become pregnant, nursing an infant, you are under age 25, or you smoke fewer than 10 cigarettes per day, talk to your caregiver before taking any nicotine replacement medicines. You should stop using a nicotine replacement product and call your caregiver if you experience nausea, dizziness, weakness, vomiting, fast or irregular heartbeat, mouth problems with the lozenge or gum, or redness or swelling of the skin around the patch that does not go away. Do not use any other product containing nicotine while using a nicotine replacement product. Talk to your caregiver before using these products if you have diabetes, heart disease, asthma, stomach ulcers, you had a recent heart attack, you have high blood pressure that is not controlled with medicine, a history of irregular heartbeat, or you have been prescribed medicine to help you quit smoking. 5. BE PREPARED FOR RELAPSE OR DIFFICULT SITUATIONS  Most relapses occur within the first 3 months after quitting. Do not be discouraged if you start smoking again. Remember, most people try several times before they finally quit. You may have symptoms of withdrawal because your body is used to nicotine. You may crave cigarettes, be irritable, feel very hungry, cough often, get headaches, or have difficulty concentrating. The withdrawal symptoms are only temporary. They are strongest when you first quit, but they will go away within 10 to 14 days. Here are some difficult situations to watch for:  Alcohol. Avoid drinking alcohol. Drinking lowers your chances of successfully quitting. Caffeine. Try to reduce the amount of caffeine you consume. It also lowers your chances of successfully quitting. Other smokers. Being around smoking can make you want to smoke. Avoid smokers. Weight gain. Many smokers will gain weight when they quit, usually less than 10 pounds. Eat a healthy diet and stay active. Do not let weight gain distract you from your main goal, quitting smoking. Some medicines that help you quit smoking may also help delay weight gain. You can always lose the weight gained after you quit. Bad mood or depression. There are a lot of ways to improve your mood other than smoking. If you are having problems with any of these situations, talk to your caregiver. SPECIAL SITUATIONS AND CONDITIONS  Studies suggest that everyone can quit smoking. Your situation or condition can give you a special reason to quit. Pregnant women/new mothers: By quitting, you protect your baby's health and your own. Hospitalized patients: By quitting, you reduce health problems and help healing. Heart attack patients: By quitting, you reduce your risk of a second heart attack. Lung, head, and neck cancer patients: By quitting, you reduce your chance of a second cancer. Parents of children and adolescents: By quitting, you protect your children from illnesses caused by secondhand smoke. QUESTIONS TO THINK ABOUT  Think about the following questions before you try to stop smoking. You may want to talk about your answers with your caregiver. Why do you want to quit? If you tried to quit in the past, what helped and what did not? What will be the most difficult situations for you after you quit? How will you plan to handle them? Who can help you through the tough times? Your family? Friends? Caregiver? What pleasures do you get from smoking? What ways can you still get pleasure if you quit? Here are some questions to ask your caregiver: How can you help me to be successful at quitting? What medicine do you think would be best for me and how should I take it? What should I do if I need more help? What is smoking withdrawal like? How can I get information on withdrawal?  Quitting takes hard work and a lot of effort, but you can quit smoking. FOR MORE INFORMATION   Smokefree. gov ([employer]) provides free, accurate, evidence-based information and professional assistance to help support the immediate and long-term needs of people trying to quit smoking.   Document Released: 12/12/2002 Document Revised: 12/06/2012 Document Reviewed: 10/04/2010  [employer]® Patient Information ©2012 [employer]. ·     Keeping Home a [facility]       As we get older, changes in balance, gait, strength, vision, hearing, and cognition make even the most youthful senior more prone to accidents. Falls are one of the leading health risks for older people. This increased risk of falling is related to:   Aging process (eg, decreased muscle strength, slowed reflexes)   Higher incidence of chronic health problems (eg, arthritis, diabetes) that may limit mobility, agility or sensory awareness   Side effects of medicine (eg, dizziness, blurred vision)especially medicines like prescription pain medicines and drugs used to treat mental health conditions   Depending on the brittleness of your bones, the consequences of a fall can be serious and long lasting. Home Life   Research by the Association of Aging [facility]) shows that some home accidents among older adults can be prevented by making simple lifestyle changes and basic modifications and repairs to the home environment. Here are some lifestyle changes that experts recommend:   Have your hearing and vision checked regularly. Be sure to wear prescription glasses that are right for you. Speak to your doctor or pharmacist about the possible side effects of your medicines. A number of medicines can cause dizziness. If you have problems with sleep, talk to your doctor. Limit your intake of alcohol. If necessary, use a cane or walker to help maintain your balance. Wear supportive, rubber-soled shoes, even at home. If you live in a region that gets wintry weather, you may want to put special cleats on your shoes to prevent you from slipping on the snow and ice. Exercise regularly to help maintain muscle tone, agility, and balance. Always hold the banister when going up or down stairs. Also, use  bars when getting in or out of the bath or shower, or using the toilet.    To avoid dizziness, get up slowly from a lying down position. Sit up first, dangling your legs for a minute or two before rising to a standing position. Overall Home Safety Check   According to the Consumer Product Safety Commision's \"Older Consumer Home Safety Checklist,\" it is important to check for potential hazards in each room. And remember, proper lighting is an essential factor in home safety. If you cannot see clearly, you are more likely to fall. Important questions to ask yourself include:   Are lamp, electric, extension, and telephone cords placed out of the flow of traffic and maintained in good condition? Have frayed cords been replaced? Are all small rugs and runners slip resistant? If not, you can secure them to the floor with a special double-sided carpet tape. Are smoke detectors properly locatedone on every floor of your home and one outside of every sleeping area? Are they in good working order? Are batteries replaced at least once a year? Do you have a well-maintained carbon monoxide detector outside every sleeping are in your home? Does your furniture layout leave plenty of space to maneuver between and around chairs, tables, beds, and sofas? Are hallways, stairs and passages between rooms well lit? Can you reach a lamp without getting out of bed? Are floor surfaces well maintained? Shag rugs, high-pile carpeting, tile floors, and polished wood floors can be particularly slippery. Stairs should always have handrails and be carpeted or fitted with a non-skid tread. Is your telephone easily reachable. Is the cord safely tucked away? Room by Room   According to the [employer] Aging, bathrooms and homer are the two most potentially hazardous rooms in your home. In the Kitchen    Be sure your stove is in proper working order and always make sure burners and the oven are off before you go out or go to sleep.     Keep pots on the back burners, turn handles away from the front of the stove, and keep stove clean and free of grease build-up. Kitchen ventilation systems and range exhausts should be working properly. Keep flammable objects such as towels and pot holders away from the cooking area except when in use. Make sure kitchen curtains are tied back. Move cords and appliances away from the sink and hot surfaces. If extension cords are needed, install wiring guides so they do not hang over the sink, range, or working areas. Look for coffee pots, kettles and toaster ovens with automatic shut-offs. Keep a mop handy in the kitchen so you can wipe up spills instantly. You should also have a small fire extinguisher. Arrange your kitchen with frequently used items on lower shelves to avoid the need to stand on a stepstool to reach them. Make sure countertops are well-lit to avoid injuries while cutting and preparing food. In the Bathroom    Use a non-slip mat or decals in the tub and shower, since wet, soapy tile or porcelain surfaces are extremely slippery. Make sure bathroom rugs are non-skid or tape them firmly to the floor. Bathtubs should have at least one, preferably two, grab bars, firmly attached to structural supports in the wall. (Do not use built-in soap holders or glass shower doors as grab bars.)    Tub seats fitted with non-slip material on the legs allow you to wash sitting down. For people with limited mobility, bathtub transfer benches allow you to slide safely into the tub. Raised toilet seats and toilet safety rails are helpful for those with knee or hip problems. In the [facility]    Make sure you use a nightlight and that the area around your bed is clear of potential obstacles. Be careful with electric blankets and never go to sleep with a heating pad, which can cause serious burns even if on a low setting. Use fire-resistant mattress covers and pillows, and NEVER smoke in bed.     Keep a phone next to the bed that is programmed to dial 911 at the push of a MD   Updated: 3/7/2011     ·        Advance Care Planning: Care Instructions  Your Care Instructions     It can be hard to live with an illness that cannot be cured. But if your health is getting worse, you may want to make decisions about end-of-life care. Planning for the end of your life does not mean that you are giving up. It is a way to make sure that your wishes are met. Clearly stating your wishes can make it easier for your loved ones. Making plans while you are still able may also ease your mind and make your final days less stressful and more meaningful. Follow-up care is a key part of your treatment and safety. Be sure to make and go to all appointments, and call your doctor if you are having problems. It's also a good idea to know your test results and keep a list of the medicines you take. What can you do to plan for the end of life? You can bring these issues up with your doctor. You do not need to wait until your doctor starts the conversation. You might start with \"I would not be willing to live with . Derril Angel Derril Monarch Derril Angel \" When you complete this sentence it helps your doctor understand your wishes. Talk openly and honestly with your doctor. This is the best way to understand the decisions you will need to make as your health changes. Know that you can always change your mind. Ask your doctor about commonly used life-support measures. These include tube feedings, breathing machines, and fluids given through a vein (IV). Understanding these treatments will help you decide whether you want them. You may choose to have these life-supporting treatments for a limited time. This allows a trial period to see whether they will help you. You may also decide that you want your doctor to take only certain measures to keep you alive. It is important to spell out these conditions so that your doctor and family understand them. Talk to your doctor about how long you are likely to live.  He or she may be able to give you an idea of what usually happens with your specific illness. Think about preparing papers that state your wishes. This way there will not be any confusion about what you want. You can change your instructions at any time. Which papers should you prepare? Advance directives are legal papers that tell doctors how you want to be cared for at the end of your life. You do not need a  to write these papers. Ask your doctor or your state health department for information on how to write your advance directives. They may have the forms for each of these types of papers. Make sure your doctor has a copy of these on file, and give a copy to a family member or close friend. Consider a do-not-resuscitate order (DNR). This order asks that no extra treatments be done if your heart stops or you stop breathing. Extra treatments may include cardiopulmonary resuscitation (CPR), electrical shock to restart your heart, or a machine to breathe for you. If you decide to have a DNR order, ask your doctor to explain and write it. Place the order in your home where everyone can easily see it. Consider a living will. A living will explains your wishes about life support and other treatments at the end of your life if you become unable to speak for yourself. Living rizo tell doctors to use or not use treatments that would keep you alive. You must have one or two witnesses or a notary present when you sign this form. A living will may be called something else in your state. Consider a medical power of . This form allows you to name a person to make decisions about your care if you are not able to. Most people ask a close friend or family member. Talk to this person about the kinds of treatments you want and those that you do not want. Make sure this person understands your wishes. A medical power of  may be called something else in your state. These legal papers are simple to change.  Tell your doctor what you want to change, and ask him or her to make a note in your medical file. Give your family updated copies of the papers. Where can you learn more? Go to https://chpepiceweb.[email or web address]. org and sign in to your JasonDBt account. Enter P184 in the [employer] box to learn more about \"Advance Care Planning: Care Instructions. \"     If you do not have an account, please click on the \"Sign Up Now\" link. Current as of: December 9, 2019               Content Version: 12.5  © 2601-3562 Healthwise, Incorporated. Care instructions adapted under license by [facility] ([facility]). If you have questions about a medical condition or this instruction, always ask your healthcare professional. [address] any warranty or liability for your use of this information. ·        Learning About Living Perroy  What is a living will? A living will, also called a declaration, is a legal form. It tells your family and your doctor your wishes when you can't speak for yourself. It's used by the health professionals who will treat you as you near the end of your life or if you get seriously hurt or ill. If you put your wishes in writing, your loved ones and others will know what kind of care you want. They won't need to guess. This can ease your mind and be helpful to others. And you can change or cancel your living will at any time. A living will is not the same as an estate or property will. An estate will explains what you want to happen with your money and property after you die. How do you use it? A living will is used to describe the kinds of treatment or life support you want as you near the end of your life or if you get seriously hurt or ill. Keep these facts in mind about living rizo. Your living will is used only if you can't speak or make decisions for yourself. Most often, one or more doctors must certify that you can't speak or decide for yourself before your living will takes effect.   If you get better and can speak for yourself again, you can accept or refuse any treatment. It doesn't matter what you said in your living will. Some states may limit your right to refuse treatment in certain cases. For example, you may need to clearly state in your living will that you don't want artificial hydration and nutrition, such as being fed through a tube. Is a living will a legal document? A living will is a legal document. Each state has its own laws about living rizo. And a living will may be called something else in your state. Here are some things to know about living rizo. You don't need an  to complete a living will. But legal advice can be helpful if your state's laws are unclear. It can also help if your health history is complicated or your family can't agree on what should be in your living will. You can change your living will at any time. Some people find that their wishes about end-of-life care change as their health changes. If you make big changes to your living will, complete a new form. If you move to another state, make sure that your living will is legal in the state where you now live. In most cases, doctors will respect your wishes even if you have a form from a different state. You might use a universal form that has been approved by many states. This kind of form can sometimes be filled out and stored online. Your digital copy will then be available wherever you have a connection to the internet. The doctors and nurses who need to treat you can find it right away. Your state may offer an online registry. This is another place where you can store your living will online. It's a good idea to get your living will notarized. This means using a person called a  to watch two people sign, or witness, your living will. What should you know when you create a living will?   Here are some questions to ask yourself as you make your living will:  Do you know enough about life support methods that might be used? If not, talk to your doctor so you know what might be done if you can't breathe on your own, your heart stops, or you can't swallow. What things would you still want to be able to do after you receive life-support methods? Would you want to be able to walk? To speak? To eat on your own? To live without the help of machines? Do you want certain [facility] practices performed if you become very ill? If you have a choice, where do you want to be cared for? In your home? At a hospital or nursing home? If you have a choice at the end of your life, where would you prefer to die? At home? In a hospital or nursing home? Somewhere else? Would you prefer to be buried or cremated? Do you want your organs to be donated after you die? What should you do with your living will? Make sure that your family members and your health care agent have copies of your living will (also called a declaration). Give your doctor a copy of your living will. Ask him or her to keep it as part of your medical record. If you have more than one doctor, make sure that each one has a copy. Put a copy of your living will where it can be easily found. For example, some people may put a copy on their refrigerator door. If you are using a digital copy, be sure your doctor, family members, and health care agent know how to find and access it. Where can you learn more? Go to https://MiQ [email or web address].eVoter. org and sign in to your [employer] account. Enter B683 in the [facility] box to learn more about \"Learning About Living Perrokellen. \"     If you do not have an account, please click on the \"Sign Up Now\" link. Current as of: December 9, 2019               Content Version: 12.5  © 7897-5651 Healthwise, Incorporated. Care instructions adapted under license by [facility] ([facility]).  If you have questions about a medical condition or this instruction, always ask your healthcare professional. [place]orporated disclaims any warranty or liability for your use of this information. ·        Learning About Medical Power of   What is a medical power of ? A medical power of , also called a durable power of  for health care, is one type of the legal forms called advance directives. It lets you name the person you want to make treatment decisions for you if you can't speak or decide for yourself. The person you choose is called your health care agent. This person is also called a health care proxy or health care surrogate. A medical power of  may be called something else in your state. How do you choose a health care agent? Choose your health care agent carefully. This person may or may not be a family member. Talk to the person before you make your final decision. Make sure he or she is comfortable with this responsibility. It's a good idea to choose someone who:  Is at least 25years old. Knows you well and understands what makes life meaningful for you. Understands your Tenriism and moral values. Will do what you want, not what he or she wants. Will be able to make difficult choices at a stressful time. Will be able to refuse or stop treatment, if that is what you would want, even if you could die. Will be firm and confident with health professionals if needed. Will ask questions to get needed information. Lives near you or agrees to travel to you if needed. Your family may help you make medical decisions while you can still be part of that process. But it's important to choose one person to be your health care agent in case you aren't able to make decisions for yourself. If you don't fill out the legal form and name a health care agent, the decisions your family can make may be limited. A health care agent may be called something else in your state. Who will make decisions for you if you don't have a health care agent?   If you don't have a health care agent or a living will, you may not get the care you want. Decisions may be made by family members who disagree about your medical care. Or decisions may be made by a medical professional who doesn't know you well. In some cases, a  makes the decisions. When you name a health care agent, it is very clear who has the power to make health decisions for you. How do you name a health care agent? You name your health care agent on a legal form. This form is usually called a medical power of . Ask your hospital, state bar association, or office on aging where to find these forms. You must sign the form to make it legal. Some states require you to get the form notarized. This means that a person called a  watches you sign the form and then he or she signs the form. Some states also require that two or more witnesses sign the form. Be sure to tell your family members and doctors who your health care agent is. Where can you learn more? Go to [email or web address]://[email or web address].[email or web address]. org and sign in to your Amiigo account. Enter [id number] in the InsuranceLibrary.com box to learn more about \"Learning About [address]. \"     If you do not have an account, please click on the \"Sign Up Now\" link. Current as of: December 9, 2019               Content Version: 12.5  © 0402-6722 Healthwise, Incorporated. Care instructions adapted under license by [facility] ([facility]). If you have questions about a medical condition or this instruction, always ask your healthcare professional. [address] any warranty or liability for your use of this information.     ·

## 2020-09-23 NOTE — PROGRESS NOTES
Medicare Annual Wellness Visit  Are Name: Marcellus Mcadams Date: 2020   MRN: [id number] Sex: Female   Age: 56 y.o. Ethnicity: Non-/Non    : 1960 Race: Reema [place] is here for Medicare AWV    Screenings for behavioral, psychosocial and functional/safety risks, and cognitive dysfunction are all negative except as indicated below. These results, as well as other patient data from the [address] form, are documented in Flowsheets linked to this Encounter. Allergies   Allergen Reactions    Codeine Hives     hives    Oxycontin [Oxycodone Hcl] Hives       Prior to Visit Medications    Medication Sig Taking? Authorizing Provider   B Complex-C-Folic Acid (NEPHRO VITAMINS) 0.8 MG TABS Take by mouth  Historical Provider, MD   albuterol sulfate HFA (VENTOLIN HFA) 108 (90 Base) MCG/ACT inhaler Inhale 2 puffs into the lungs every 6 hours as needed for Wheezing  Historical Provider, MD   sertraline (ZOLOFT) 50 MG tablet TAKE 1 TABLET BY MOUTH DAILY  Eduardo Melton MD   blood glucose monitor strips 1 strip by Other route 4 times daily (before meals and nightly) Pt test 4x daily Dx E11.65. May substitute for generic or insurance covered product  MARCELO Brasher   fluticasone (FLOVENT HFA) 110 MCG/ACT inhaler Inhale 2 puffs into the lungs 2 times daily  Eduardo Melton MD   insulin aspart (NOVOLOG FLEXPEN) 100 UNIT/ML injection pen INJECT 18 UNITS SUBCUTANEOUSLY BEFORE BREAKFAST AND DINNER AND 8 UNITS BEFORE LUNCH  MARCELO Brasher   Misc. Devices [facility]) MISC To use with transport outside of the house. Eduardo Melton MD   ARIPiprazole (ABILIFY) 5 MG tablet TAKE 1 TABLET BY MOUTH DAILY  Eduardo Melton MD   pregabalin (LYRICA) 75 MG capsule Take 1 capsule by mouth 2 times daily for 90 days.  TAKE 1 CAPSULE BY MOUTH TWICE DAILY  Eduardo Melton MD   LANTUS SOLOSTAR 100 UNIT/ML injection pen INJECT 30 [facility] [address], MD   fluconazole (DIFLUCAN) 150 MG tablet Take one 150 mg tablet today orally and then 72 hours later (3 days) take take the other 150 mg tablet orally.   Libra Coy, LORETO - CNP   Magnesium Oxide -Mg Supplement 400 MG CAPS   Historical Provider, MD   ticagrelor (BRILINTA) 90 MG TABS tablet Take 90 mg by mouth 2 times daily  Historical Provider, MD   isosorbide mononitrate (IMDUR) 60 MG extended release tablet Take 1 tablet by mouth daily  Hesham Holland MD   ipratropium-albuterol (DUONEB) 0.5-2.5 (3) MG/3ML SOLN nebulizer solution Inhale 3 mLs into the lungs every 4 hours as needed for Shortness of Breath  Brenna Little MD   nystatin ([address]) 126148 UNIT/GM powder APPLY TO ABDOMINAL FOLDS EVERY 12 HOURS AS NEEDED  Ramón Garcia MD   Blood Glucose Monitoring Suppl (FREESTYLE LITE) CORETTA 1 Device by Does not apply route daily as needed Use freestyle meter to test blood sugar as needed  Historical Provider, MD   ranolazine (RANEXA) 500 MG extended release tablet Take 1 tablet by mouth 2 times daily  Marcia Brigitte,    pantoprazole (PROTONIX) 20 MG tablet TAKE 1 TABLET BY MOUTH DAILY  Yamile Mistry MD   amLODIPine (NORVASC) 10 MG tablet TAKE 1 TABLET BY MOUTH DAILY  Yamile Mistry MD   aspirin 81 MG tablet Take 1 tablet by mouth daily  Ramón Garcia MD   atorvastatin (LIPITOR) 40 MG tablet Take 1 tablet by mouth daily  Ramón Garcia MD   SURE COMFORT PEN NEEDLES 30G X 8 MM MISC USE AS DIRECTED FIVE TIMES A DAY  Ramón Garcia MD   melatonin 3 MG TABS tablet TAKE 1 TABLET BY MOUTH EVERY NIGHT AS NEEDED FOR INSOMNIA  Ramón Garcia MD   nitroGLYCERIN (NITROSTAT) 0.4 MG SL tablet Place 1 tablet under the tongue every 5 minutes as needed for Chest pain  Historical Provider, MD   magnesium oxide (MAG-OX) 400 MG tablet Take 400 mg by mouth daily  Historical Provider, MD   vitamin B-12 (CYANOCOBALAMIN) 100 MCG tablet TAKE 1 TABLET BY MOUTH DAILY  Gloria Jackman MD       Past Medical History:   Diagnosis Date    Anxiety     CAD S/P percutaneous coronary angioplasty ,     stents per dr Emilia Monte CKD stage 4 due to type 2 diabetes mellitus ([facility]     Diabetic nephropathy with proteinuria ([facility]     DJD (degenerative joint disease) of knee     Dr Ally Stone GERD (gastroesophageal reflux disease)     Hemiparesis, left ([facility] 2013    [facility] Ass[facility]    History of heart failure     History of seizures     History of type C viral hepatitis     HTN (hypertension)     Hyperlipidemia     Impaired mobility and activities of daily living     Mediastinal lymphadenopathy     [place]    Metabolic syndrome     Moderate persistent asthma without complication     Neurogenic urinary incontinence 2013    Neuropathy in diabetes ([facility]     Obesity (BMI 30-39. 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 Ricardo Gupta      x1 Dr. Delmi Rosado, Dr Arlin Mckoy CATH LAB PROCEDURE  10/02/2019    HYSTERECTOMY, TOTAL ABDOMINAL      one ovary intact, Dr Lenka Lock, menorrhagia    CA TOTAL KNEE ARTHROPLASTY Left 2018    LEFT KNEE TOTAL KNEE ARTHROPLASTY, SHAYNA, NERVE BLOCK performed by Isabel Lal MD at [address] [place] [address] Right     TOTAL KNEE ARTHROPLASTY  05/19/16    Dr Lisa Singer TUNNELED [address] Right 07/01/2020    tunneled HD catheter per Dr Alaina Capps       Family History   Problem Relation Age of Onset    Cancer Mother 76        survived   [employer] Hypertension Father     Diabetes Sister     Mental Illness Sister        CareTeam (Including outside providers/suppliers regularly involved in providing care):   Patient Care Team:  Lakeisha Chapa MD as PCP - General (Family Medicine)  Lakeisha Chapa MD as PCP - [facility]  Brunilda Licea MD (Urology)  Edin Diaz MD as Cardiologist (Interventional Cardiology)  LORETO Hirsch - CNP as Advanced Practice Nurse (Palliative Medicine)    Wt Readings from Last 3 Encounters:   09/23/20 218 lb (98.9 kg)   08/03/20 219 lb (99.3 kg)   07/27/20 222 lb (100.7 kg)      Patient-Reported Vitals 9/23/2020   Patient-Reported Weight 219lb   Patient-Reported Height 5' 7\"   Patient-Reported Systolic 034   Patient-Reported Diastolic 80      There is no height or weight on file to calculate BMI. Based upon direct observation of the patient, evaluation of cognition reveals recent and remote memory intact. Patient's complete Health Risk Assessment and screening values have been reviewed and are found in Flowsheets. The following problems were reviewed today and where indicated follow up appointments were made and/or referrals ordered. Positive Risk Factor Screenings with Interventions:     Health Habits/Nutrition:  Health Habits/Nutrition  Do you exercise for at least 20 minutes 2-3 times per week?: Yes  Have you lost any weight without trying in the past 3 months?: (!) Yes(14lb)  Do you eat fewer than 2 meals per day?: No  Have you seen a dentist within the past year?: Yes  There is no height or weight on file to calculate BMI.   Health Habits/Nutrition Interventions:  · Inadequate physical activity:  educational materials provided to promote increased physical activity  · Nutritional issues:  educational materials for healthy, well-balanced diet provided  · Dental exam overdue:  patient encouraged to make appointment with his/her dentist    Hearing/Vision:  No exam data present  Hearing/Vision  Do you or your family notice any trouble with your hearing?: No  Do you have difficulty driving, watching TV, or doing any of your daily activities because of your eyesight?: (!) Yes  Have you had an eye exam within the past year?: Yes  Hearing/Vision Interventions:  · Vision concerns:  patient encouraged to make appointment with his/her eye specialist    ADL:  ADLs  In the past 7 days, did you need help from others to perform any of the following everyday activities? Eating, dressing, grooming, bathing, toileting, or walking/balance?: (!) Bathing  In the past 7 days, did you need help from others to take care of any of the following?  Laundry, housekeeping, banking/finances, shopping, telephone use, food preparation, transportation, or taking medications?: (!) Laundry, Housekeeping, [employer], Shopping, Food Preparation, Transportation  ADL Interventions:  · Patient declines any further evaluation/treatment for this issue    Personalized Preventive Plan   Current Health Maintenance Status  Immunization History   Administered Date(s) Administered    Influenza Vaccine, unspecified formulation 10/14/2016    Influenza Virus Vaccine 10/20/2014, 10/30/2015, 10/14/2016, 09/29/2017, 10/12/2018    Influenza Whole 10/20/2014    Influenza, Quadv, IM, (6 mo and older Fluzone, Flulaval, Fluarix and 3 yrs and older Afluria) 09/29/2017, 10/12/2018    Influenza, Quadv, IM, PF (6 mo and older Fluzone, Flulaval, Fluarix, and 3 yrs and older Afluria) 10/03/2019    Influenza, Triv, 3 Years and older, IM (Afluria (5 yrs and older) 10/14/2016    Pneumococcal Polysaccharide ([id number]) 10/15/2016    Tdap (Boostrix, Adacel) 08/03/2020        Health Maintenance   Topic Date Due    Flu vaccine (1) 09/01/2020    Breast cancer screen  09/07/2020    Shingles Vaccine (1 of 2) 10/23/2020 (Originally 1/25/2008)    Pneumococcal 0-64 years Vaccine (2 of 3 - PCV13) 10/23/2020 (Originally 10/15/2017)    A1C test (Diabetic or Prediabetic)  09/04/2021    Potassium monitoring  09/12/2021    Creatinine monitoring  09/12/2021    Colon cancer screen colonoscopy  01/09/2024    DTaP/Tdap/Td vaccine (2 - Td) 08/03/2030    Hepatitis A vaccine  Aged Out    Hib vaccine  Aged Out    Meningococcal (ACWY) vaccine  Aged Out     Recommendations for [email or web address] Due: see orders and patient instructions/AVS.  . Recommended screening schedule for the next 5-10 years is provided to the patient in written form: see Patient Instructions/AVS.    There are no diagnoses linked to this encounter. Deonte Crump is a 56 y.o. female being evaluated by a Virtual Visit (phone) encounter to address concerns as mentioned above. A caregiver was present when appropriate. Due to this being a TeleHealth encounter (During TZOTQ-94 public health emergency), evaluation of the following organ systems was limited: Vitals/Constitutional/EENT/Resp/CV/GI//MS/Neuro/Skin/Heme-Lymph-Imm. Pursuant to the emergency declaration under the [address] [place] [address] authority and the [employer] Act, this Virtual Visit was conducted with patient's (and/or legal guardian's) consent, to reduce the patient's risk of exposure to COVID-19 and provide necessary medical care. The patient (and/or legal guardian) has also been advised to contact this office for worsening conditions or problems, and seek emergency medical treatment and/or call 911 if deemed necessary.      Patient identification was verified at the start of the visit: Yes    Services were provided through phone to substitute for in-person clinic visit. Patient and provider were located at their individual homes. --Bayron Carrillo MD on 9/23/2020 at 5:29 PM    An electronic signature was used to authenticate this note.

## 2020-09-23 NOTE — PROGRESS NOTES
Assessment:       Diagnosis Orders   1. Uncontrolled type 2 diabetes mellitus with hyperglycemia (HCC)  POCT Glucose    Hemoglobin A1C    Comprehensive Metabolic Panel    Alcohol prep pad     AVG am glucose 200-250  CRF-recent dialysis started  PLAN:     1. Increase Lantus 40 units at night   2. Novolog 12 units before meals   3. PLUS Sliding scale 150-200 2 unit, 200-250 4 units, 250 or higher 6 units   4. Dexcom G6 ordered   5. Follow-up in 3 months      Orders Placed This Encounter   Procedures    Alcohol prep pad    Hemoglobin A1C     Standing Status:   Future     Standing Expiration Date:   2021    Comprehensive Metabolic Panel     Standing Status:   Future     Standing Expiration Date:   2021    POCT Glucose     Orders Placed This Encounter   Medications    blood glucose monitor strips     Si strip by Other route 4 times daily (before meals and nightly) Pt test 4x daily Dx E11.65. May substitute for generic or insurance covered product     Dispense:  150 strip     Refill:  3     Return in about 3 months (around 2020) for Diabetes. Subjective:     Chief Complaint   Patient presents with    Diabetes     Vitals:    20 1136   BP: 109/64   Site: Left Upper Arm   Position: Sitting   Cuff Size: Large Adult   Pulse: 74   Weight: 218 lb (98.9 kg)   Height: 5' 7\" (1.702 m)     Wt Readings from Last 3 Encounters:   20 218 lb (98.9 kg)   20 219 lb (99.3 kg)   20 222 lb (100.7 kg)     BP Readings from Last 3 Encounters:   20 109/64   20 128/84   08/10/20 126/82     Diabetes   She presents for her follow-up diabetic visit. She has type 2 diabetes mellitus. The initial diagnosis of diabetes was made 25 years ago. Her disease course has been stable. Hypoglycemia symptoms include confusion (with hypoglycemia), sweats (with hypoglycemia) and tremors (with hypoglycemia). Pertinent negatives for hypoglycemia include no dizziness or headaches.  Associated symptoms include foot paresthesias and weakness. Pertinent negatives for diabetes include no chest pain, no fatigue, no polydipsia and no polyuria. Hypoglycemia complications include required assistance. Diabetic complications include heart disease and peripheral neuropathy. Risk factors for coronary artery disease include dyslipidemia, hypertension and family history. Current diabetic treatment includes insulin injections. She is compliant with treatment all of the time. She is currently taking insulin pre-breakfast, pre-lunch, pre-dinner and at bedtime. Insulin injections are given by patient and adult caretaker. Rotation sites for injection include the abdominal wall. She is following a generally unhealthy diet. Meal planning includes avoidance of concentrated sweets. She rarely participates in exercise. She monitors blood glucose at home 5+ x per day (Multiple insulin dosing changes beign made ). Blood glucose monitoring compliance is good. There is no change in her home blood glucose trend. Her overall blood glucose range is 140-180 mg/dl. An ACE inhibitor/angiotensin II receptor blocker is being taken. She sees a podiatrist.Eye exam is current.      Past Medical History:   Diagnosis Date    Anxiety     CAD S/P percutaneous coronary angioplasty 2015, 2018    stents per dr Moe Bond CKD stage 4 due to type 2 diabetes mellitus ([address]     Diabetic nephropathy with proteinuria ([address] 2014    DJD (degenerative joint disease) of knee     Dr Jessie Reyes GERD (gastroesophageal reflux disease)     Hemiparesis, left ([address] 2013    [facility] Assisted Living ([facility]    History of heart failure     History of seizures     History of type C viral hepatitis     HTN (hypertension)     Hyperlipidemia     Impaired mobility and activities of daily living     Mediastinal lymphadenopathy 2013    Obie Saldivar    Metabolic syndrome     Neurogenic urinary incontinence 2013    Neuropathy in diabetes ([facility]  Obesity (BMI 30-39. 9)     Recurrent UTI     S/P colonoscopy     [facility], focal active colitis    Schizophrenia, paranoid, chronic (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     Type 2 diabetes mellitus with renal manifestations, controlled ([facility]     Insulin dependent, Dr Livan Thomas Urinary incontinence due to cognitive impairment     Vitamin D deficiency      Past Surgical History:   Procedure Laterality Date     SECTION      x1    COLONOSCOPY  2014    Dr. Ricardo Gupta      x1 Dr. Delmi Rosado, Dr Arlin Mckoy CATH LAB PROCEDURE  10/02/2019    HYSTERECTOMY, TOTAL ABDOMINAL      one ovary intact, Dr Lenka Lock, menorrhagia    [place] TOTAL KNEE ARTHROPLASTY Left 2018    LEFT KNEE TOTAL KNEE ARTHROPLASTY, SHAYNA, NERVE BLOCK performed by Charlie Leal MD at [address] [place] [address] Right     TOTAL KNEE ARTHROPLASTY  16    Dr Ally Stone [address]UN[address]ELED [address] Right 2020    tunneled HD catheter per Dr Lia Mckeon Marital status:      Spouse name: Not on file    Number of children: 2    Years of education: Not on file    Highest education level: Not on file   Occupational History    Occupation: disabled   Social Needs    Financial resource strain: Not on file    Food insecurity     Worry: Not on file     Inability: Not on file   [employer] needs     Medical: Not on file     Non-medical: Not on file   Tobacco Use    Smoking status: Passive Smoke Exposure - Never Smoker    Smokeless tobacco: Never Used   Substance and Sexual Activity    Alcohol use: No     Alcohol/week: 0.0 standard drinks    Drug use: No    Sexual activity: Not Currently Lifestyle    Physical activity     Days per week: Not on file     Minutes per session: Not on file    Stress: Not on file   Relationships    Social connections     Talks on phone: Not on file     Gets together: Not on file     Attends Buddhism service: Not on file     Active member of club or organization: Not on file     Attends meetings of clubs or organizations: Not on file     Relationship status: Not on file    Intimate partner violence     Fear of current or ex partner: Not on file     Emotionally abused: Not on file     Physically abused: Not on file     Forced sexual activity: Not on file   Other Topics Concern    Not on file   Social History Narrative    Born in [place], one of 5    Twin sister Reyes, very ill in 2018, [place] [address]    Moved to [facility], , 2 children, one son and one daughter    Worked at [employer], as a nurse's aide    Disabled due to mental illness    Lived at [place], was discharged, returned to independent living in 2017 in the daughter's house and has adjusted well    One son and one daughter, live in the same house with patient, Izabella Bauer pays the rent    [employer] reading ([employer])     Family History   Problem Relation Age of Onset    Cancer Mother 76        survived    Hypertension Father     Diabetes Sister     Mental Illness Sister      Allergies   Allergen Reactions    Codeine Hives     hives    Oxycontin [Oxycodone Hcl] Hives       Current Outpatient Medications:     B Complex-C-Folic Acid (NEPHRO VITAMINS) 0.8 MG TABS, Take by mouth, Disp: , Rfl:     cinacalcet (SENSIPAR) 30 MG tablet, Take 30 mg by mouth daily, Disp: , Rfl:     blood glucose monitor strips, 1 strip by Other route 4 times daily (before meals and nightly) Pt test 4x daily Dx E11.65.   May substitute for generic or insurance covered product, Disp: 150 strip, Rfl: 3    triamcinolone (KENALOG) 0.1 % cream, APPLY TO AFFECTED AREA TWICE DAILY AS DIRECTED (Patient not taking: Reported on 9/23/2020), Disp: 80 g, Rfl: 1    sertraline (ZOLOFT) 50 MG tablet, TAKE 1 TABLET BY MOUTH DAILY, Disp: 90 tablet, Rfl: 0    carvedilol (COREG) 3.125 MG tablet, Take 1 tablet by mouth 2 times daily (Patient not taking: Reported on 9/23/2020), Disp: 60 tablet, Rfl: 3    insulin aspart (NOVOLOG FLEXPEN) 100 UNIT/ML injection pen, INJECT 18 UNITS SUBCUTANEOUSLY BEFORE BREAKFAST AND DINNER AND 8 UNITS BEFORE LUNCH, Disp: 15 mL, Rfl: 10    Misc. Devices [facility]) MIS, To use with transport outside of the house., Disp: 1 each, Rfl: 0    ARIPiprazole (ABILIFY) 5 MG tablet, TAKE 1 TABLET BY MOUTH DAILY, Disp: 30 tablet, Rfl: 2    pregabalin (LYRICA) 75 MG capsule, Take 1 capsule by mouth 2 times daily for 90 days. TAKE 1 CAPSULE BY MOUTH TWICE DAILY, Disp: 60 capsule, Rfl: 2    LANTUS SOLOSTAR 100 UNIT/ML injection pen, INJECT 35 UNITS SUBCUTANEOUSLY AT NIGHT, Disp: 15 mL, Rfl: 10    fluconazole (DIFLUCAN) 150 MG tablet, Take one 150 mg tablet today orally and then 72 hours later (3 days) take take the other 150 mg tablet orally. , Disp: 2 tablet, Rfl: 0    aluminum & magnesium hydroxide-simethicone (ALMACONE) 200-200-20 MG/5ML SUSP suspension, Take 30 mLs by mouth every 4 hours as needed for Indigestion, Disp: , Rfl:     Magnesium Oxide -Mg Supplement 400 MG CAPS, , Disp: , Rfl:     ticagrelor (BRILINTA) 90 MG TABS tablet, Take 90 mg by mouth 2 times daily, Disp: , Rfl:     isosorbide mononitrate (IMDUR) 60 MG extended release tablet, Take 1 tablet by mouth daily, Disp: 90 tablet, Rfl: 3    ipratropium-albuterol (DUONEB) 0.5-2.5 (3) MG/3ML SOLN nebulizer solution, Inhale 3 mLs into the lungs every 4 hours as needed for Shortness of Breath, Disp: 360 mL, Rfl: 0    nystatin (NYAMYC) 870374 UNIT/GM powder, APPLY TO ABDOMINAL FOLDS EVERY 12 HOURS AS NEEDED, Disp: 60 g, Rfl: 3    Blood Glucose Monitoring Suppl (FREESTYLE LITE) CORETTA, 1 Device by Does not apply route daily as needed Use freestyle meter to test blood sugar as needed, Disp: , Rfl:     ranolazine (RANEXA) 500 MG extended release tablet, Take 1 tablet by mouth 2 times daily, Disp: 60 tablet, Rfl: 3    pantoprazole (PROTONIX) 20 MG tablet, TAKE 1 TABLET BY MOUTH DAILY, Disp: 90 tablet, Rfl: 1    amLODIPine (NORVASC) 10 MG tablet, TAKE 1 TABLET BY MOUTH DAILY, Disp: 90 tablet, Rfl: 1    aspirin 81 MG tablet, Take 1 tablet by mouth daily, Disp: 90 tablet, Rfl: 3    atorvastatin (LIPITOR) 40 MG tablet, Take 1 tablet by mouth daily, Disp: 90 tablet, Rfl: 3    SURE COMFORT PEN NEEDLES 30G X 8 MM MISC, USE AS DIRECTED FIVE TIMES A DAY, Disp: 100 each, Rfl: 10    melatonin 3 MG TABS tablet, TAKE 1 TABLET BY MOUTH EVERY NIGHT AS NEEDED FOR INSOMNIA, Disp: 180 tablet, Rfl: 4    nitroGLYCERIN (NITROSTAT) 0.4 MG SL tablet, Place 1 tablet under the tongue every 5 minutes as needed for Chest pain, Disp: , Rfl:     vitamin B-12 (CYANOCOBALAMIN) 100 MCG tablet, TAKE 1 TABLET BY MOUTH DAILY, Disp: 30 tablet, Rfl: 11    albuterol sulfate HFA (VENTOLIN HFA) 108 (90 Base) MCG/ACT inhaler, Inhale 2 puffs into the lungs every 6 hours as needed for Wheezing, Disp: , Rfl:     magnesium oxide (MAG-OX) 400 MG tablet, Take 400 mg by mouth daily, Disp: , Rfl:   Lab Results   Component Value Date     (L) 09/12/2020    K 4.1 09/12/2020     09/12/2020    CO2 21 07/06/2020    BUN 41 (H) 07/06/2020    CREATININE 3.50 (H) 09/12/2020    GLUCOSE 245 09/23/2020    CALCIUM 10.0 09/12/2020    PROT 6.9 06/28/2020    LABALBU 4.0 06/28/2020    BILITOT 0.5 06/28/2020    ALKPHOS 105 06/28/2020    AST 45 (H) 06/28/2020    ALT 34 (H) 06/28/2020    LABGLOM 13 (A) 09/12/2020    GFRAA 16 (A) 09/12/2020    GLOB 2.9 06/28/2020     Lab Results   Component Value Date    WBC 6.2 09/04/2020    HGB 10.0 (L) 09/04/2020    HCT 28.4 (L) 09/04/2020    MCV 88 09/04/2020     09/04/2020     Lab Results   Component Value Date    LABA1C 6.1 09/04/2020    LABA1C 6.8 (H) 06/11/2020    LABA1C 7.1 (H) 05/24/2020     Lab Results   Component Value Date    CHOL 101 06/11/2020    CHOL 107 05/25/2020    CHOL 137 02/26/2019     Lab Results   Component Value Date    TRIG 82 06/11/2020    TRIG 85 05/25/2020    TRIG 116 02/26/2019     Lab Results   Component Value Date    HDL 48 06/11/2020    HDL 44 05/25/2020    HDL 55 02/26/2019     Lab Results   Component Value Date    LDLCALC 37 06/11/2020    LDLCALC 46 05/25/2020    LDLCALC 59 02/26/2019     Lab Results   Component Value Date    LABVLDL 59.0 05/04/2013    VLDL 43 01/30/2017     Lab Results   Component Value Date    CHOLHDLRATIO 4.32 01/30/2017     No results found for: TESTM  Lab Results   Component Value Date    TSH 0.454 06/28/2020    TSH 0.574 10/08/2017    TSH 1.320 11/22/2015       Review of Systems   Constitutional: Negative for chills, fatigue and fever. HENT: Negative for congestion, ear pain, postnasal drip, rhinorrhea, sinus pressure and sore throat. Eyes: Negative for pain and redness. Respiratory: Negative for cough, shortness of breath and wheezing. Cardiovascular: Negative for chest pain, palpitations and leg swelling. Gastrointestinal: Negative for abdominal pain, diarrhea, nausea and vomiting. Endocrine: Negative for cold intolerance, heat intolerance, polydipsia and polyuria. Genitourinary: Negative for difficulty urinating. Musculoskeletal: Positive for gait problem. Negative for arthralgias. Leg weakness with multiple falls    Skin: Negative for rash. Allergic/Immunologic: Negative for environmental allergies. Neurological: Positive for tremors (with hypoglycemia) and weakness. Negative for dizziness and headaches. Psychiatric/Behavioral: Positive for confusion (with hypoglycemia). Objective:   Physical Exam  Vitals signs reviewed. Constitutional:       Appearance: She is well-developed. HENT:      Head: Normocephalic and atraumatic. Nose: No congestion.       Mouth/Throat:

## 2020-09-28 ENCOUNTER — TELEPHONE (OUTPATIENT)
Dept: CARDIOLOGY CLINIC | Age: 60
End: 2020-09-28

## 2020-09-28 RX ORDER — BLOOD-GLUCOSE METER
KIT MISCELLANEOUS
Qty: 150 STRIP | Refills: 3 | Status: SHIPPED | OUTPATIENT
Start: 2020-09-28 | End: 2021-01-18 | Stop reason: SDUPTHER

## 2020-09-28 NOTE — TELEPHONE ENCOUNTER
The patients daughter called and she is asking about the Carvedilol 3.125mg. She has not been taking it since she was discharged back in the summer from [address] [place] had been decreased at that time from 12.5 to the 3.125mg. Her PCP discontinued the med a week ago since the patient stated she was not taking it at this time. The patient and family are asking should she be on he coreg 3.125mg bid or should she continue to hold at this time. Thank you.

## 2020-09-30 ENCOUNTER — HOSPITAL ENCOUNTER (OUTPATIENT)
Dept: WOMENS IMAGING | Age: 60
Discharge: HOME | End: 2020-10-02
Payer: MEDICARE

## 2020-09-30 PROCEDURE — 77067 SCR MAMMO BI INCL CAD: CPT

## 2020-10-05 ENCOUNTER — NURSE TRIAGE (OUTPATIENT)
Dept: OTHER | Facility: CLINIC | Age: 60
End: 2020-10-05

## 2020-10-05 ENCOUNTER — TELEPHONE (OUTPATIENT)
Dept: ENDOCRINOLOGY | Age: 60
End: 2020-10-05

## 2020-10-14 ENCOUNTER — HOSPITAL ENCOUNTER (OUTPATIENT)
Dept: WOMENS IMAGING | Age: 60
Discharge: HOME | End: 2020-10-16
Payer: MEDICARE

## 2020-10-14 ENCOUNTER — APPOINTMENT (OUTPATIENT)
Dept: ULTRASOUND IMAGING | Age: 60
End: 2020-10-14
Payer: MEDICARE

## 2020-10-14 PROCEDURE — 77065 DX MAMMO INCL CAD UNI: CPT

## 2020-10-28 ENCOUNTER — OFFICE VISIT (OUTPATIENT)
Dept: CARDIOLOGY CLINIC | Age: 60
End: 2020-10-28
Payer: MEDICARE

## 2020-10-28 VITALS — HEART RATE: 80 BPM | OXYGEN SATURATION: 98 % | DIASTOLIC BLOOD PRESSURE: 60 MMHG | SYSTOLIC BLOOD PRESSURE: 120 MMHG

## 2020-10-28 PROCEDURE — G8427 DOCREV CUR MEDS BY ELIG CLIN: HCPCS | Performed by: INTERNAL MEDICINE

## 2020-10-28 PROCEDURE — G8484 FLU IMMUNIZE NO ADMIN: HCPCS | Performed by: INTERNAL MEDICINE

## 2020-10-28 PROCEDURE — G8417 CALC BMI ABV UP PARAM F/U: HCPCS | Performed by: INTERNAL MEDICINE

## 2020-10-28 PROCEDURE — 3017F COLORECTAL CA SCREEN DOC REV: CPT | Performed by: INTERNAL MEDICINE

## 2020-10-28 PROCEDURE — 1036F TOBACCO NON-USER: CPT | Performed by: INTERNAL MEDICINE

## 2020-10-28 PROCEDURE — 99214 OFFICE O/P EST MOD 30 MIN: CPT | Performed by: INTERNAL MEDICINE

## 2020-10-28 ASSESSMENT — ENCOUNTER SYMPTOMS
CHEST TIGHTNESS: 0
STRIDOR: 0
BLOOD IN STOOL: 0
WHEEZING: 0
NAUSEA: 0
GASTROINTESTINAL NEGATIVE: 1
EYES NEGATIVE: 1
RESPIRATORY NEGATIVE: 1
COUGH: 0
SHORTNESS OF BREATH: 0

## 2020-10-28 NOTE — PROGRESS NOTES
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 Ismael Harper      x1 Dr. Jeanine Riley, Dr Kimberly Landon CATH LAB PROCEDURE  10/02/2019    HYSTERECTOMY, TOTAL ABDOMINAL      one ovary intact, Dr Rod Avila, menorrhagia    [place] TOTAL KNEE ARTHROPLASTY Left 2018    LEFT KNEE TOTAL KNEE ARTHROPLASTY, SHAYNA, NERVE BLOCK performed by Rivera Gooden MD at [address] [place] [address] Right     TOTAL KNEE ARTHROPLASTY  16    Dr Chalo Elise TUN[address]ELED [address] Right 2020    tunneled HD catheter per Dr Vivi De Jesus       Family History   Problem Relation Age of Onset    Cancer Mother 76        survived   [facility] Hypertension Father     Diabetes Sister     Mental Illness Sister        Social History     Socioeconomic History    Marital status:      Spouse name: Not on file    Number of children: 2    Years of education: Not on file    Highest education level: Not on file   Occupational History    Occupation: disabled   Social Needs    Financial resource strain: Not on file    Food insecurity     Worry: Not on file     Inability: Not on file   [employer] needs     Medical: Not on file     Non-medical: Not on file   Tobacco Use    Smoking status: Passive Smoke Exposure - Never Smoker    Smokeless tobacco: Never Used   Substance and Sexual Activity    Alcohol use: No     Alcohol/week: 0.0 standard drinks    Drug use: No    Sexual activity: Not Currently   Lifestyle    Physical activity     Days per week: Not on file     Minutes per session: Not on file    Stress: Not on file   Relationships    Social connections     Talks on phone: Not on file     Gets together: Not on file     Attends Alevism service: Not on file     Active member of club or organization: Not on file     Attends meetings of clubs or organizations: Not on file     Relationship status: Not on file    Intimate partner violence     Fear of current or ex partner: Not on file     Emotionally abused: Not on file     Physically abused: Not on file     Forced sexual activity: Not on file   Other Topics Concern    Not on file   Social History Narrative    Born in [place], one of 5    Twin sister Reyes, very ill in 2018, [place] [address]    Moved to [facility], , 2 children, one son and one daughter    Worked at [employer], as a nurse's aide    Disabled due to mental illness    Lived at [employer], was discharged, returned to independent living in 2017 in the daughter's house and has adjusted well    One son and one daughter, live in the same house with patient, Kori Parrish pays the rent    Hobbies reading ([employer])       Allergies   Allergen Reactions    Codeine Hives     hives    Oxycontin [Oxycodone Hcl] Hives       Current Outpatient Medications   Medication Sig Dispense Refill    nystatin (NYAMYC) 792651 UNIT/GM powder APPLY TO ABDOMINAL FOLDS EVERY 12 HOURS AS NEEDED 60 g 2    D3-1000 25 MCG (1000 UT) CAPS TAKE 1 CAPSULE BY MOUTH ONCE DAILY 90 capsule 4    FREESTYLE LITE strip TEST FOUR TIMES DAILY BEFORE MEALS AND NIGHTLY 150 strip 3    B Complex-C-Folic Acid (NEPHRO VITAMINS) 0.8 MG TABS Take by mouth      albuterol sulfate HFA (VENTOLIN HFA) 108 (90 Base) MCG/ACT inhaler Inhale 2 puffs into the lungs every 6 hours as needed for Wheezing      sertraline (ZOLOFT) 50 MG tablet TAKE 1 TABLET BY MOUTH DAILY 90 tablet 3    fluticasone (FLOVENT HFA) 110 MCG/ACT inhaler Inhale 2 puffs into the lungs 2 times daily 1 Inhaler 12    insulin aspart (NOVOLOG FLEXPEN) 100 UNIT/ML injection pen INJECT 18 UNITS SUBCUTANEOUSLY BEFORE BREAKFAST AND DINNER AND 8 UNITS BEFORE LUNCH 15 mL 10    Misc. Devices [facility]) MISC To use with transport outside of the house. 1 each 0    ARIPiprazole (ABILIFY) 5 MG tablet TAKE 1 TABLET BY MOUTH DAILY 30 tablet 2    pregabalin (LYRICA) 75 MG capsule Take 1 capsule by mouth 2 times daily for 90 days. TAKE 1 CAPSULE BY MOUTH TWICE DAILY 60 capsule 2    LANTUS SOLOSTAR 100 UNIT/ML injection pen INJECT 35 UNITS SUBCUTANEOUSLY AT NIGHT 15 mL 10    fluconazole (DIFLUCAN) 150 MG tablet Take one 150 mg tablet today orally and then 72 hours later (3 days) take take the other 150 mg tablet orally.  2 tablet 0    Magnesium Oxide -Mg Supplement 400 MG CAPS       ticagrelor (BRILINTA) 90 MG TABS tablet Take 90 mg by mouth 2 times daily      isosorbide mononitrate (IMDUR) 60 MG extended release tablet Take 1 tablet by mouth daily 90 tablet 3    ipratropium-albuterol (DUONEB) 0.5-2.5 (3) MG/3ML SOLN nebulizer solution Inhale 3 mLs into the lungs every 4 hours as needed for Shortness of Breath 360 mL 0    Blood Glucose Monitoring Suppl (FREESTYLE LITE) CORETTA 1 Device by Does not apply route daily as needed Use freestyle meter to test blood sugar as needed      ranolazine (RANEXA) 500 MG extended release tablet Take 1 tablet by mouth 2 times daily 60 tablet 3    pantoprazole (PROTONIX) 20 MG tablet TAKE 1 TABLET BY MOUTH DAILY 90 tablet 1    amLODIPine (NORVASC) 10 MG tablet TAKE 1 TABLET BY MOUTH DAILY 90 tablet 1    aspirin 81 MG tablet Take 1 tablet by mouth daily 90 tablet 3    atorvastatin (LIPITOR) 40 MG tablet Take 1 tablet by mouth daily 90 tablet 3    SURE COMFORT PEN NEEDLES 30G X 8 MM MISC USE AS DIRECTED FIVE TIMES A  each 10    melatonin 3 MG TABS tablet TAKE 1 TABLET BY MOUTH EVERY NIGHT AS NEEDED FOR INSOMNIA 180 tablet 4    nitroGLYCERIN (NITROSTAT) 0.4 MG SL tablet Place 1 tablet under the tongue every 5 minutes as needed for Chest pain      Incontinence of urine    Diabetic nephropathy with proteinuria (HCC)    Essential hypertension    History of type C viral hepatitis    Urinary incontinence due to cognitive impairment    History of seizures    Stented coronary artery-plan is to stay on Plavix indefinately per Dr Adri Avina    Hemiparesis, left [facility])    Angina, class II ([address]    CKD (chronic kidney disease) stage 4, GFR 15-29 ml/min (HCC)    Chronic painful diabetic neuropathy (HCC)    Tardive dyskinesia    Shortness of breath    Uncontrolled type 2 diabetes mellitus with hyperglycemia (HCC)    Diastolic congestive heart failure (HCC)    Sleep apnea    Pulmonary hypertension (HCC)    Class 2 severe obesity with serious comorbidity and body mass index (BMI) of 36.0 to 36.9 in adult ([address]    Edema    Closed supracondylar fracture of right humerus    Other chronic pain    Palliative care patient    Chest pain    Recurrent falls    Renal failure    Difficulty in walking    ESRD (end stage renal disease) on dialysis ([address]    Weakness    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 Modified Medications    No medications on file       No orders of the defined types were placed in this encounter. Assessment/Plan:    1. Coronary artery disease involving native heart with angina pectoris, unspecified vessel or lesion type ([address]  Stable. She was suppose to f/u Dr. Jhon Barry. Next appt in  1 month w him. Refills given    2. Diastolic congestive heart failure, unspecified HF chronicity (HCC)  stable    3. Stented coronary artery    4. Shortness of breath   stable     5. Mixed hyperlipidemia  Statin     6. Essential hypertension       7. Coronary artery disease involving native coronary artery of native heart with angina pectoris ([address]    8. Weight gain- I believe it is erroneous sscale reading.  She has not Peripheral

## 2020-10-29 RX ORDER — UBIDECARENONE 75 MG
100 CAPSULE ORAL DAILY
Qty: 30 TABLET | Refills: 10 | Status: SHIPPED | OUTPATIENT
Start: 2020-10-29 | End: 2021-01-18 | Stop reason: SDUPTHER

## 2020-10-29 NOTE — TELEPHONE ENCOUNTER
The patient was seen in the office on 10/28/20 and the issue was addressed with Dr. Ericka Saucedo at that time.

## 2020-11-02 RX ORDER — PANTOPRAZOLE SODIUM 20 MG/1
20 TABLET, DELAYED RELEASE ORAL DAILY
Qty: 90 TABLET | Refills: 3 | Status: SHIPPED | OUTPATIENT
Start: 2020-11-02 | End: 2021-10-26

## 2020-11-02 RX ORDER — TICAGRELOR 90 MG/1
TABLET ORAL
Qty: 60 TABLET | Refills: 11 | Status: SHIPPED | OUTPATIENT
Start: 2020-11-02 | End: 2021-10-26

## 2020-11-03 RX ORDER — ARIPIPRAZOLE 5 MG/1
5 TABLET ORAL DAILY
Qty: 30 TABLET | Refills: 2 | Status: ON HOLD | OUTPATIENT
Start: 2020-11-03 | End: 2021-02-18

## 2020-11-03 NOTE — TELEPHONE ENCOUNTER
Rx request   Requested Prescriptions     Pending Prescriptions Disp Refills    ARIPiprazole (ABILIFY) 5 MG tablet [Pharmacy Med Name: ARIPIPRAZOLE 5 MG TABS 5 Tablet] 30 tablet 2     Sig: TAKE 1 TABLET BY MOUTH DAILY     LOV 9/23/2020  Next Visit Date:  Future Appointments   Date Time Provider Aminata Cortes   12/23/2020 11:00 AM MARCELO Wiley [facility] Aaron   12/24/2020  8:30 AM Be[facility] Jamison [facility]   1/27/2021 12:45 PM Kenny Kerr MD [address] [place] [address]

## 2020-11-04 DIAGNOSIS — E11.40 CHRONIC PAINFUL DIABETIC NEUROPATHY (HCC): ICD-10-CM

## 2020-11-04 NOTE — TELEPHONE ENCOUNTER
Rx request   Requested Prescriptions     Pending Prescriptions Disp Refills    triamcinolone (KENALOG) 0.1 % cream 80 g 1     Sig: APPLY TO AFFECTED AREA TWICE DAILY AS DIRECTED    pregabalin (LYRICA) 75 MG capsule 60 capsule 2     Sig: Take 1 capsule by mouth 2 times daily for 90 days.  TAKE 1 CAPSULE BY MOUTH TWICE DAILY     LOV 9/23/2020  Next Visit Date:  Future Appointments   Date Time Provider Aminata Cortes   12/23/2020 11:00 AM MARCELO [facility] Sakina Walker   12/24/2020  8:30 AM MD Aaron Araujo [facility] Aaron   1/27/2021 12:45 PM Darwin Patel MD [address] [place] [address]

## 2020-11-06 RX ORDER — PREGABALIN 75 MG/1
75 CAPSULE ORAL 2 TIMES DAILY
Qty: 60 CAPSULE | Refills: 2 | OUTPATIENT
Start: 2020-11-06 | End: 2021-02-04

## 2020-11-06 RX ORDER — TRIAMCINOLONE ACETONIDE 1 MG/G
CREAM TOPICAL
Qty: 80 G | Refills: 1 | Status: ON HOLD | OUTPATIENT
Start: 2020-11-06 | End: 2021-01-20

## 2020-11-24 RX ORDER — PREGABALIN 75 MG/1
CAPSULE ORAL
Qty: 60 CAPSULE | Refills: 2 | OUTPATIENT
Start: 2020-11-24 | End: 2021-02-22

## 2020-11-24 RX ORDER — RANOLAZINE 500 MG/1
500 TABLET, EXTENDED RELEASE ORAL 2 TIMES DAILY
Qty: 180 TABLET | Refills: 2 | Status: SHIPPED | OUTPATIENT
Start: 2020-11-24 | End: 2021-10-13 | Stop reason: SDUPTHER

## 2020-11-24 NOTE — TELEPHONE ENCOUNTER
Rx request   Requested Prescriptions     Pending Prescriptions Disp Refills    pregabalin (LYRICA) 75 MG capsule 60 capsule 2     LOV 9/23/2020  Next Visit Date:  Future Appointments   Date Time Provider Aminata Cortes   12/23/2020 11:00 [facility]   12/24/2020  8:30 AM MD Aaron Head [facility] Aaron   1/27/2021 12:45 PM Jannette Curry MD [facility]

## 2020-11-30 ENCOUNTER — TELEPHONE (OUTPATIENT)
Dept: INTERVENTIONAL RADIOLOGY/VASCULAR | Age: 60
End: 2020-11-30

## 2020-11-30 NOTE — TELEPHONE ENCOUNTER
PATIENT WAS GIVEN THIS INFORMATION VIA PHONE CONVERSATION - VOICED UNDERSTANDING  >  YOUR PROCEDURE IS SCHEDULED ON: 12/2/20 @ NOON  >  You will need to arrive at 11:00 am from home and check in at the Welcome Desk located through the Outpatient Entrance before your scheduled time of the procedure.

## 2020-12-02 ENCOUNTER — HOSPITAL ENCOUNTER (OUTPATIENT)
Dept: INTERVENTIONAL RADIOLOGY/VASCULAR | Age: 60
Discharge: HOME | End: 2020-12-04
Payer: MEDICARE

## 2020-12-02 PROCEDURE — 2500000003 HC RX 250 WO HCPCS: Performed by: RADIOLOGY

## 2020-12-02 PROCEDURE — 77001 FLUOROGUIDE FOR VEIN DEVICE: CPT | Performed by: RADIOLOGY

## 2020-12-02 PROCEDURE — 36589 REMOVAL TUNNELED CV CATH: CPT | Performed by: RADIOLOGY

## 2020-12-02 PROCEDURE — 2709999900 IR REMOVE TUNNELED CVAD WO SQ PORT/PUMP

## 2020-12-02 RX ORDER — LIDOCAINE HYDROCHLORIDE 20 MG/ML
INJECTION, SOLUTION INFILTRATION; PERINEURAL
Status: COMPLETED | OUTPATIENT
Start: 2020-12-02 | End: 2020-12-02

## 2020-12-02 RX ADMIN — LIDOCAINE HYDROCHLORIDE 10 ML: 20 INJECTION, SOLUTION INFILTRATION; PERINEURAL at 11:47

## 2020-12-02 NOTE — SEDATION DOCUMENTATION
1140 Procedure explained, consent signed. 1145 Timeout done, upper right chest prepped with chloraprep, draped with sterile towels. 1147 Lidocaine 2% 10cc given by Dr Jose Martin Brody to numb skin. 1150 Catheter removed by Dr Jalaine Aase, then pressure held by Jessica CERNA.  1205 Pressure released, no oozing noted but lump under skin, per Dr Jose Martin Brody possible hematoma or lidocaine under skin, he recommended a pressure dressing over initial dressing. 1210 Jessica CERNA adding dressing at the direction of Merlene ALEXANDER. Pt tolerated well. Will have chest xray next and then D/C to home by provided ride. 1222 Discharge insructions reviewed, pt verbalized understanding. Pt having post HD cath removal CXR now. 1230 Pt d/c to home, will wait in lobby for Garret Singer that brought her.   Scheduled Pickup time is 1pm.

## 2020-12-03 ENCOUNTER — TELEPHONE (OUTPATIENT)
Dept: FAMILY MEDICINE CLINIC | Age: 60
End: 2020-12-03

## 2020-12-03 RX ORDER — PREGABALIN 75 MG/1
CAPSULE ORAL
Qty: 60 CAPSULE | Refills: 2 | Status: CANCELLED | OUTPATIENT
Start: 2020-12-03

## 2020-12-03 NOTE — TELEPHONE ENCOUNTER
Pt called in asking if her lyrica could be sent over electronically. She said has been without this med for a week now.     Thanks

## 2020-12-03 NOTE — TELEPHONE ENCOUNTER
Rx request   Requested Prescriptions     Pending Prescriptions Disp Refills    pregabalin (LYRICA) 75 MG capsule 60 capsule 2     Sig: TAKE ONE (1) CAPSULE BY MOUTH TWICE DAILY     LOV 9/23/2020  Next Visit Date:  Future Appointments   Date Time Provider Aminata Cortes   12/23/2020 11:00 AM MARCELO Jesus [facility]   12/24/2020  8:30 AM Chau Valiente MD [facility]   1/27/2021 12:45 PM Swati Vela MD [facility]

## 2020-12-24 ENCOUNTER — VIRTUAL VISIT (OUTPATIENT)
Dept: FAMILY MEDICINE CLINIC | Age: 60
End: 2020-12-24
Payer: MEDICARE

## 2020-12-24 PROBLEM — N18.4 CKD (CHRONIC KIDNEY DISEASE) STAGE 4, GFR 15-29 ML/MIN (HCC): Chronic | Status: RESOLVED | Noted: 2018-02-24 | Resolved: 2020-12-24

## 2020-12-24 PROBLEM — S98.131A: Status: RESOLVED | Noted: 2020-08-10 | Resolved: 2020-12-24

## 2020-12-24 PROBLEM — B37.0 THRUSH: Chronic | Status: ACTIVE | Noted: 2020-12-24

## 2020-12-24 PROCEDURE — 2022F DILAT RTA XM EVC RTNOPTHY: CPT | Performed by: FAMILY MEDICINE

## 2020-12-24 PROCEDURE — 99215 OFFICE O/P EST HI 40 MIN: CPT | Performed by: FAMILY MEDICINE

## 2020-12-24 PROCEDURE — 3044F HG A1C LEVEL LT 7.0%: CPT | Performed by: FAMILY MEDICINE

## 2020-12-24 PROCEDURE — G8427 DOCREV CUR MEDS BY ELIG CLIN: HCPCS | Performed by: FAMILY MEDICINE

## 2020-12-24 PROCEDURE — 3017F COLORECTAL CA SCREEN DOC REV: CPT | Performed by: FAMILY MEDICINE

## 2020-12-24 RX ORDER — FLUCONAZOLE 100 MG/1
100 TABLET ORAL DAILY
Qty: 14 TABLET | Refills: 0 | Status: ON HOLD | OUTPATIENT
Start: 2020-12-24 | End: 2020-12-27 | Stop reason: HOSPADM

## 2020-12-24 NOTE — PROGRESS NOTES
ARIPiprazole (ABILIFY) 5 MG tablet TAKE 1 TABLET BY MOUTH DAILY Yes Elizabeth Kaye MD   BRILINTA 90 MG TABS tablet TAKE 1 TABLET BY MOUTH 2 TIMES DAILY Yes Bette Sandifer, MD   pantoprazole (PROTONIX) 20 MG tablet TAKE 1 TABLET BY MOUTH DAILY Yes Bette Sandifer, MD   vitamin B-12 (CYANOCOBALAMIN) 100 MCG tablet TAKE 1 TABLET BY MOUTH DAILY Yes Elizabeth Kaye MD   nystatin ([address]) 937670 UNIT/GM powder APPLY TO ABDOMINAL FOLDS EVERY 12 HOURS AS NEEDED Yes Elizabeth Kaye MD   FREESTYLE LITE strip TEST FOUR TIMES DAILY BEFORE MEALS AND NIGHTLY Yes MARCELO Lara   B Complex-C-Folic Acid (NEPHRO VITAMINS) 0.8 MG TABS Take by mouth Yes Historical Provider, MD   albuterol sulfate HFA (VENTOLIN HFA) 108 (90 Base) MCG/ACT inhaler Inhale 2 puffs into the lungs every 6 hours as needed for Wheezing Yes Historical Provider, MD   sertraline (ZOLOFT) 50 MG tablet TAKE 1 TABLET BY MOUTH DAILY Yes Elizabeth Kaye MD   fluticasone (FLOVENT HFA) 110 MCG/ACT inhaler Inhale 2 puffs into the lungs 2 times daily Yes Elizabeth Kaye MD   insulin aspart (NOVOLOG FLEXPEN) 100 UNIT/ML injection pen INJECT 18 UNITS SUBCUTANEOUSLY BEFORE BREAKFAST AND DINNER AND 8 UNITS BEFORE LUNCH Yes MARCELO Lara   Misc. Devices [facility]) MISC To use with transport outside of the house.  Yes Elizabeth Kaye MD   LANTUS SOLOSTAR 100 UNIT/ML injection pen INJECT 35 UNITS SUBCUTANEOUSLY AT NIGHT  Patient taking differently: Indications: 44 units  Yes Sofiya Plasencia MD   isosorbide mononitrate (IMDUR) 60 MG extended release tablet Take 1 tablet by mouth daily Yes Hannah Tsai MD   ipratropium-albuterol (DUONEB) 0.5-2.5 (3) MG/3ML SOLN nebulizer solution Inhale 3 mLs into the lungs every 4 hours as needed for Shortness of Breath Yes Cheryle Cox MD Blood Glucose Monitoring Suppl (FREESTYLE LITE) CORETTA 1 Device by Does not apply route daily as needed Use freestyle meter to test blood sugar as needed Yes Historical Provider, MD   amLODIPine (NORVASC) 10 MG tablet TAKE 1 TABLET BY MOUTH DAILY Yes Cintia Nina MD   aspirin 81 MG tablet Take 1 tablet by mouth daily Yes Alissa Omer MD   atorvastatin (LIPITOR) 40 MG tablet Take 1 tablet by mouth daily Yes Alissa Omer MD   SURE COMFORT PEN NEEDLES 30G X 8 MM MISC USE AS DIRECTED FIVE TIMES A DAY Yes Alissa Omer MD   melatonin 3 MG TABS tablet TAKE 1 TABLET BY MOUTH EVERY NIGHT AS NEEDED FOR INSOMNIA Yes Alissa Omer MD   nitroGLYCERIN (NITROSTAT) 0.4 MG SL tablet Place 1 tablet under the tongue every 5 minutes as needed for Chest pain Yes Historical Provider, MD   magnesium oxide (MAG-OX) 400 MG tablet Take 400 mg by mouth daily Yes Historical Provider, MD   D3-1000 25 MCG (1000 UT) CAPS TAKE 1 CAPSULE BY MOUTH ONCE DAILY  Patient not taking: Reported on 12/24/2020  Alissa Omer MD   Magnesium Oxide -Mg Supplement 400 MG CAPS   Historical Provider, MD       Social History     Tobacco Use    Smoking status: Passive Smoke Exposure - Never Smoker    Smokeless tobacco: Never Used   Substance Use Topics    Alcohol use: No     Alcohol/week: 0.0 standard drinks    Drug use: No        Allergies   Allergen Reactions    Codeine Hives     hives    Oxycontin [Oxycodone Hcl] Hives   ,   Past Medical History:   Diagnosis Date    Anxiety     CAD S/P percutaneous coronary angioplasty 2015, 2018    stents per dr Arlene Medrano    CKD (chronic kidney disease) stage 4, GFR 15-29 ml/min ([facility] [address] 2/24/2018    CKD stage 4 due to type 2 diabetes mellitus ([facility]     Diabetic nephropathy with proteinuria ([facility] [facility] 2014    DJD (degenerative joint disease) of knee     Dr Ruby Noel GERD (gastroesophageal reflux disease)     Hemiparesis, left ([facility] [address] [facility] 2013 entered Assisted Living ([facility])    History of heart failure     History of seizures     History of type C viral hepatitis     HTN (hypertension)     Hyperlipidemia     Impaired mobility and activities of daily living     Mediastinal lymphadenopathy     Dr Mary Tejada, Feli Nicholas    Metabolic syndrome     Moderate persistent asthma without complication 2474    Neurogenic urinary incontinence 2013    Neuropathy in diabetes ([facility] [address] [facility]     Obesity (BMI 30-39. 9)     Recurrent UTI     S/P colonoscopy     [facility], focal active colitis    Schizophrenia, paranoid, chronic ([facility] [address]     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 [address]     Type 2 diabetes mellitus with renal manifestations, controlled ([facility] [address]     Insulin dependent, Dr Carmen Haley Urinary incontinence due to cognitive impairment     Vitamin D deficiency    ,   Past Surgical History:   Procedure Laterality Date     SECTION      x1    COLONOSCOPY  2014    Dr. Andrea Alejo      x1 Dr. Jovi Hamilton, Dr Roman Skill CATH LAB PROCEDURE  10/02/2019    HYSTERECTOMY, TOTAL ABDOMINAL      one ovary intact, Dr Alessia Melendez, menorrhagia    [facility] Left 2018    LEFT KNEE TOTAL KNEE ARTHROPLASTY, SHAYNA, NERVE BLOCK performed by Damián Whitney MD at [facility] Right     TOTAL KNEE ARTHROPLASTY  16    Dr Qureshi Patches TUN[address]ELED [address] Right 2020    tunneled HD catheter per Dr Leann Ledesma   ,   Social History     Tobacco Use    Smoking status: Passive Smoke Exposure - Never Smoker    Smokeless tobacco: Never Used   Substance Use Topics    Alcohol use: No     Alcohol/week: 0.0 standard drinks    Drug use: No   , Family History   Problem Relation Age of Onset   [address] [facility] Cancer Mother 76        survived   [address] [facility] Hypertension Father     Diabetes Sister     Mental Illness Sister        PHYSICAL EXAMINATION:  [ INSTRUCTIONS:  \"[x]\" Indicates a positive item  \"[]\" Indicates a negative item  -- DELETE ALL ITEMS NOT EXAMINED]  Vital Signs: unavailable    Patient appears to be alert and oriented to person, place, time, situation and is in no acute distress. Respiratory effort appears normal. Mood appears stable and speech and thought are grossly normal.    ASSESSMENT/PLAN:  Joe Thomas was seen today for oral pain and vaginal itching. Diagnoses and all orders for this visit:    Coronary artery disease involving native coronary artery of native heart with angina pectoris ([address]  Comments:  Stable and fairly well-controlled on current meds. Followed by cardiology. Essential hypertension  Comments:  Reviewed meds, weight loss, diet. Diastolic congestive heart failure, unspecified HF chronicity ([address]  Comments:  Stable with fair control on current meds. Followed by cardiology    Pulmonary hypertension ([address]  Comments:  Stable with fair control. Followed by pulmonology    Moderate persistent asthma without complication  Comments:  Stable with fair control. Followed by pulmonology    Chronic painful diabetic neuropathy ([address]  Comments:  Stable with fair control on Lyrica. No signs of misuse or diversion. Refill as needed. Uncontrolled type 2 diabetes mellitus with hyperglycemia ([address]  Comments: Worse, fair control. Has follow-up with Derek Harrington on Monday.     Hemiparesis, left (HCC)  Comments:  Stable and well-controlled in that condition has not worsened    ESRD (end stage renal disease) on dialysis [facility])  Comments:  Stable and with fair control in that there is no evidence of worsening condition    Mixed hyperlipidemia  Comments:  Stable and well-controlled on current meds Class 2 severe obesity due to excess calories with serious comorbidity and body mass index (BMI) of 36.0 to 36.9 in adult [facility])  Comments:  Stable with fair control. Urged healthy diet. Thrush  Comments:  Treat with fluconazole 100 mg daily for 2 weeks. Call for  persistent symptoms  Orders:  -     fluconazole (DIFLUCAN) 100 MG tablet; Take 1 tablet by mouth daily for 14 days    Need for shingles vaccine  -     zoster recombinant adjuvanted vaccine [facility]) 50 MCG/0.5ML SUSR injection; Inject 0.5 mLs into the muscle See Admin Instructions 1 dose now and repeat in 2-6 months          Return in about 3 months (around 3/24/2021) for CAD, CKD, COPD, DM, HTN, HLD, Mood Disorder, Obesity - OVMinh Garcia is a 56 y.o. female being evaluated by a Virtual Visit (telephonic visit) encounter to address concerns as mentioned above. A caregiver was present when appropriate. Due to this being a TeleHealth encounter ([facility]), evaluation of the following organ systems was limited: Vitals/Constitutional/EENT/Resp/CV/GI//MS/Neuro/Skin/Heme-Lymph-Imm. Pursuant to the emergency declaration under the [address] [place] [place] [address], [address] [place] [address] authority and the [employer] and [employer] General Act, this Virtual Visit was conducted with patient's (and/or legal guardian's) consent, to reduce the patient's risk of exposure to COVID-19 and provide necessary medical care. The patient (and/or legal guardian) has also been advised to contact this office for worsening conditions or problems, and seek emergency medical treatment and/or call 911 if deemed necessary. Services were provided through a telephonic synchronous discussion virtually to substitute for in-person clinic visit. Patient and provider were located at their individual homes.     --Yaron Allen MD on 12/24/2020 at 9:23 AM An electronic signature was used to authenticate this note.

## 2020-12-25 ENCOUNTER — APPOINTMENT (OUTPATIENT)
Dept: GENERAL RADIOLOGY | Age: 60
DRG: 177 | End: 2020-12-25
Payer: MEDICARE

## 2020-12-25 ENCOUNTER — HOSPITAL ENCOUNTER (INPATIENT)
Age: 60
LOS: 2 days | Discharge: HOME | DRG: 177 | End: 2020-12-28
Attending: INTERNAL MEDICINE | Admitting: INTERNAL MEDICINE
Payer: MEDICARE

## 2020-12-25 LAB
ALBUMIN SERPL-MCNC: 4 G/DL (ref 3.5–4.6)
ALP BLD-CCNC: 144 U/L (ref 40–130)
ALT SERPL-CCNC: 17 U/L (ref 0–33)
ANION GAP SERPL CALCULATED.3IONS-SCNC: 17 MEQ/L (ref 9–15)
AST SERPL-CCNC: 25 U/L (ref 0–35)
BASOPHILS ABSOLUTE: 0 K/UL (ref 0–0.2)
BASOPHILS RELATIVE PERCENT: 0.3 %
BILIRUB SERPL-MCNC: 0.8 MG/DL (ref 0.2–0.7)
BUN BLDV-MCNC: 41 MG/DL (ref 8–23)
CALCIUM SERPL-MCNC: 8.1 MG/DL (ref 8.5–9.9)
CHLORIDE BLD-SCNC: 82 MEQ/L (ref 95–107)
CO2: 28 MEQ/L (ref 20–31)
CREAT SERPL-MCNC: 5.57 MG/DL (ref 0.5–0.9)
EKG ATRIAL RATE: 71 BPM
EKG P AXIS: 90 DEGREES
EKG P-R INTERVAL: 248 MS
EKG Q-T INTERVAL: 492 MS
EKG QRS DURATION: 140 MS
EKG QTC CALCULATION (BAZETT): 534 MS
EKG R AXIS: -52 DEGREES
EKG T AXIS: 89 DEGREES
EKG VENTRICULAR RATE: 71 BPM
EOSINOPHILS ABSOLUTE: 0.1 K/UL (ref 0–0.7)
EOSINOPHILS RELATIVE PERCENT: 1.6 %
GFR AFRICAN AMERICAN: 9.3
GFR NON-AFRICAN AMERICAN: 7.7
GLOBULIN: 3.4 G/DL (ref 2.3–3.5)
GLUCOSE BLD-MCNC: 444 MG/DL (ref 70–99)
HBA1C MFR BLD: 7.2 % (ref 4.8–5.9)
HCT VFR BLD CALC: 24 % (ref 37–47)
HEMOGLOBIN: 8.7 G/DL (ref 12–16)
LYMPHOCYTES ABSOLUTE: 1.7 K/UL (ref 1–4.8)
LYMPHOCYTES RELATIVE PERCENT: 19.7 %
MAGNESIUM: 2.6 MG/DL (ref 1.7–2.4)
MCH RBC QN AUTO: 32.4 PG (ref 27–31.3)
MCHC RBC AUTO-ENTMCNC: 36.3 % (ref 33–37)
MCV RBC AUTO: 89.5 FL (ref 82–100)
MONOCYTES ABSOLUTE: 0.8 K/UL (ref 0.2–0.8)
MONOCYTES RELATIVE PERCENT: 9.5 %
NEUTROPHILS ABSOLUTE: 6 K/UL (ref 1.4–6.5)
NEUTROPHILS RELATIVE PERCENT: 68.9 %
PDW BLD-RTO: 15.2 % (ref 11.5–14.5)
PLATELET # BLD: 242 K/UL (ref 130–400)
POTASSIUM SERPL-SCNC: 3.4 MEQ/L (ref 3.4–4.9)
RBC # BLD: 2.68 M/UL (ref 4.2–5.4)
SARS-COV-2, NAAT: DETECTED
SODIUM BLD-SCNC: 127 MEQ/L (ref 135–144)
TOTAL CK: 68 U/L (ref 0–170)
TOTAL PROTEIN: 7.4 G/DL (ref 6.3–8)
TROPONIN: 0.02 NG/ML (ref 0–0.01)
WBC # BLD: 8.8 K/UL (ref 4.8–10.8)

## 2020-12-25 PROCEDURE — 2580000003 HC RX 258: Performed by: PHYSICIAN ASSISTANT

## 2020-12-25 PROCEDURE — 84484 ASSAY OF TROPONIN QUANT: CPT

## 2020-12-25 PROCEDURE — 83036 HEMOGLOBIN GLYCOSYLATED A1C: CPT

## 2020-12-25 PROCEDURE — 84295 ASSAY OF SERUM SODIUM: CPT

## 2020-12-25 PROCEDURE — 99285 EMERGENCY DEPT VISIT HI MDM: CPT

## 2020-12-25 PROCEDURE — 82010 KETONE BODYS QUAN: CPT

## 2020-12-25 PROCEDURE — 82330 ASSAY OF CALCIUM: CPT

## 2020-12-25 PROCEDURE — 80053 COMPREHEN METABOLIC PANEL: CPT

## 2020-12-25 PROCEDURE — 71045 X-RAY EXAM CHEST 1 VIEW: CPT

## 2020-12-25 PROCEDURE — 85025 COMPLETE CBC W/AUTO DIFF WBC: CPT

## 2020-12-25 PROCEDURE — 36415 COLL VENOUS BLD VENIPUNCTURE: CPT

## 2020-12-25 PROCEDURE — 83735 ASSAY OF MAGNESIUM: CPT

## 2020-12-25 PROCEDURE — 81001 URINALYSIS AUTO W/SCOPE: CPT

## 2020-12-25 PROCEDURE — 84132 ASSAY OF SERUM POTASSIUM: CPT

## 2020-12-25 PROCEDURE — 6370000000 HC RX 637 (ALT 250 FOR IP): Performed by: PHYSICIAN ASSISTANT

## 2020-12-25 PROCEDURE — 93005 ELECTROCARDIOGRAM TRACING: CPT | Performed by: PHYSICIAN ASSISTANT

## 2020-12-25 PROCEDURE — 36600 WITHDRAWAL OF ARTERIAL BLOOD: CPT

## 2020-12-25 PROCEDURE — 82803 BLOOD GASES ANY COMBINATION: CPT

## 2020-12-25 PROCEDURE — 82550 ASSAY OF CK (CPK): CPT

## 2020-12-25 PROCEDURE — 82565 ASSAY OF CREATININE: CPT

## 2020-12-25 PROCEDURE — 85014 HEMATOCRIT: CPT

## 2020-12-25 PROCEDURE — U0002 COVID-19 LAB TEST NON-CDC: HCPCS

## 2020-12-25 PROCEDURE — 83605 ASSAY OF LACTIC ACID: CPT

## 2020-12-25 PROCEDURE — 82435 ASSAY OF BLOOD CHLORIDE: CPT

## 2020-12-25 RX ORDER — MECLIZINE HYDROCHLORIDE 25 MG/1
25 TABLET ORAL ONCE
Status: COMPLETED | OUTPATIENT
Start: 2020-12-25 | End: 2020-12-25

## 2020-12-25 RX ORDER — 0.9 % SODIUM CHLORIDE 0.9 %
1000 INTRAVENOUS SOLUTION INTRAVENOUS ONCE
Status: COMPLETED | OUTPATIENT
Start: 2020-12-25 | End: 2020-12-26

## 2020-12-25 RX ADMIN — MECLIZINE HYDROCHLORIDE 25 MG: 25 TABLET ORAL at 23:38

## 2020-12-25 RX ADMIN — SODIUM CHLORIDE 1000 ML: 9 INJECTION, SOLUTION INTRAVENOUS at 23:32

## 2020-12-25 ASSESSMENT — ENCOUNTER SYMPTOMS
DIARRHEA: 1
TROUBLE SWALLOWING: 0
ABDOMINAL PAIN: 0
APNEA: 0
SHORTNESS OF BREATH: 0
COLOR CHANGE: 0
EYE PAIN: 0
ALLERGIC/IMMUNOLOGIC NEGATIVE: 1

## 2020-12-26 ENCOUNTER — APPOINTMENT (OUTPATIENT)
Dept: NUCLEAR MEDICINE | Age: 60
DRG: 177 | End: 2020-12-26
Payer: MEDICARE

## 2020-12-26 PROBLEM — U07.1 COVID-19: Status: ACTIVE | Noted: 2020-12-26

## 2020-12-26 LAB
ALBUMIN SERPL-MCNC: 3.1 G/DL (ref 3.5–4.6)
ALP BLD-CCNC: 122 U/L (ref 40–130)
ALT SERPL-CCNC: 17 U/L (ref 0–33)
ANION GAP SERPL CALCULATED.3IONS-SCNC: 16 MEQ/L (ref 9–15)
APTT: 31.2 SEC (ref 24.4–36.8)
AST SERPL-CCNC: 28 U/L (ref 0–35)
BACTERIA: NEGATIVE /HPF
BASE EXCESS VENOUS: 5 (ref -3–3)
BASOPHILS ABSOLUTE: 0 K/UL (ref 0–0.2)
BASOPHILS RELATIVE PERCENT: 0.3 %
BETA-HYDROXYBUTYRATE: 1 MG/DL (ref 0.2–2.8)
BILIRUB SERPL-MCNC: 0.6 MG/DL (ref 0.2–0.7)
BILIRUBIN URINE: ABNORMAL
BLOOD, URINE: NEGATIVE
BUN BLDV-MCNC: 44 MG/DL (ref 8–23)
C-REACTIVE PROTEIN: 48.8 MG/L (ref 0–5)
CALCIUM IONIZED: 0.9 MMOL/L (ref 1.12–1.32)
CALCIUM SERPL-MCNC: 7.5 MG/DL (ref 8.5–9.9)
CHLORIDE BLD-SCNC: 90 MEQ/L (ref 95–107)
CLARITY: CLEAR
CO2: 25 MEQ/L (ref 20–31)
COLOR: ABNORMAL
CREAT SERPL-MCNC: 5.53 MG/DL (ref 0.5–0.9)
D DIMER: 3.02 MG/L FEU (ref 0–0.5)
EOSINOPHILS ABSOLUTE: 0.1 K/UL (ref 0–0.7)
EOSINOPHILS RELATIVE PERCENT: 1 %
EPITHELIAL CELLS, UA: NORMAL /HPF (ref 0–5)
FIBRINOGEN: 505 MG/DL (ref 235–507)
GFR AFRICAN AMERICAN: 11
GFR AFRICAN AMERICAN: 9.4
GFR NON-AFRICAN AMERICAN: 7.8
GFR NON-AFRICAN AMERICAN: 9
GLOBULIN: 3.5 G/DL (ref 2.3–3.5)
GLUCOSE BLD-MCNC: 121 MG/DL (ref 60–115)
GLUCOSE BLD-MCNC: 335 MG/DL (ref 60–115)
GLUCOSE BLD-MCNC: 386 MG/DL (ref 60–115)
GLUCOSE BLD-MCNC: 401 MG/DL (ref 70–99)
GLUCOSE BLD-MCNC: 443 MG/DL (ref 60–115)
GLUCOSE BLD-MCNC: 455 MG/DL (ref 60–115)
GLUCOSE BLD-MCNC: 482 MG/DL (ref 60–115)
GLUCOSE BLD-MCNC: 498 MG/DL (ref 60–115)
GLUCOSE BLD-MCNC: 511 MG/DL (ref 60–115)
GLUCOSE BLD-MCNC: 549 MG/DL (ref 60–115)
GLUCOSE URINE: 500 MG/DL
HBA1C MFR BLD: 7.7 % (ref 4.8–5.9)
HCO3 VENOUS: 28.4 MMOL/L (ref 23–29)
HCT VFR BLD CALC: 20.8 % (ref 37–47)
HEMOGLOBIN: 7.5 G/DL (ref 12–16)
HEMOGLOBIN: 8.2 GM/DL (ref 12–16)
HYALINE CASTS: NORMAL /HPF (ref 0–5)
INR BLD: 1.2
KETONES, URINE: ABNORMAL MG/DL
LACTATE DEHYDROGENASE: 296 U/L (ref 135–214)
LACTATE: 5.41 MMOL/L (ref 0.4–2)
LEUKOCYTE ESTERASE, URINE: ABNORMAL
LYMPHOCYTES ABSOLUTE: 0.8 K/UL (ref 1–4.8)
LYMPHOCYTES RELATIVE PERCENT: 10.5 %
MCH RBC QN AUTO: 32.2 PG (ref 27–31.3)
MCHC RBC AUTO-ENTMCNC: 35.9 % (ref 33–37)
MCV RBC AUTO: 89.8 FL (ref 82–100)
MONOCYTES ABSOLUTE: 0.3 K/UL (ref 0.2–0.8)
MONOCYTES RELATIVE PERCENT: 4.5 %
NEUTROPHILS ABSOLUTE: 6 K/UL (ref 1.4–6.5)
NEUTROPHILS RELATIVE PERCENT: 83.7 %
NITRITE, URINE: NEGATIVE
O2 SAT, VEN: 76 %
PCO2, VEN: 40.6 MM HG (ref 40–50)
PDW BLD-RTO: 14.8 % (ref 11.5–14.5)
PERFORMED ON: ABNORMAL
PH UA: 5 (ref 5–9)
PH VENOUS: 7.45 (ref 7.35–7.45)
PLATELET # BLD: 184 K/UL (ref 130–400)
PO2, VEN: 39 MM HG
POC CHLORIDE: 91 MEQ/L (ref 99–110)
POC CREATININE: 5 MG/DL (ref 0.6–1.2)
POC HEMATOCRIT: 24 % (ref 36–48)
POC POTASSIUM: 3.5 MEQ/L (ref 3.5–5.1)
POC SAMPLE TYPE: ABNORMAL
POC SODIUM: 133 MEQ/L (ref 136–145)
POTASSIUM REFLEX MAGNESIUM: 3.8 MEQ/L (ref 3.4–4.9)
PROCALCITONIN: 0.25 NG/ML (ref 0–0.15)
PROTEIN UA: 100 MG/DL
PROTHROMBIN TIME: 15.3 SEC (ref 12.3–14.9)
RBC # BLD: 2.32 M/UL (ref 4.2–5.4)
RBC UA: NORMAL /HPF (ref 0–5)
SODIUM BLD-SCNC: 131 MEQ/L (ref 135–144)
SPECIFIC GRAVITY UA: 1.02 (ref 1–1.03)
TCO2 CALC VENOUS: 30 MMOL/L
TOTAL PROTEIN: 6.6 G/DL (ref 6.3–8)
URINE REFLEX TO CULTURE: ABNORMAL
UROBILINOGEN, URINE: 1 E.U./DL
WBC # BLD: 7.2 K/UL (ref 4.8–10.8)
WBC UA: NORMAL /HPF (ref 0–5)

## 2020-12-26 PROCEDURE — 3430000000 HC RX DIAGNOSTIC RADIOPHARMACEUTICAL: Performed by: INTERNAL MEDICINE

## 2020-12-26 PROCEDURE — 6370000000 HC RX 637 (ALT 250 FOR IP): Performed by: INTERNAL MEDICINE

## 2020-12-26 PROCEDURE — 2580000003 HC RX 258: Performed by: INTERNAL MEDICINE

## 2020-12-26 PROCEDURE — 85730 THROMBOPLASTIN TIME PARTIAL: CPT

## 2020-12-26 PROCEDURE — 6360000002 HC RX W HCPCS: Performed by: NURSE PRACTITIONER

## 2020-12-26 PROCEDURE — 85379 FIBRIN DEGRADATION QUANT: CPT

## 2020-12-26 PROCEDURE — 78580 LUNG PERFUSION IMAGING: CPT

## 2020-12-26 PROCEDURE — 6370000000 HC RX 637 (ALT 250 FOR IP): Performed by: PHYSICIAN ASSISTANT

## 2020-12-26 PROCEDURE — 85610 PROTHROMBIN TIME: CPT

## 2020-12-26 PROCEDURE — 94761 N-INVAS EAR/PLS OXIMETRY MLT: CPT

## 2020-12-26 PROCEDURE — 94664 DEMO&/EVAL PT USE INHALER: CPT

## 2020-12-26 PROCEDURE — 99221 1ST HOSP IP/OBS SF/LOW 40: CPT | Performed by: INTERNAL MEDICINE

## 2020-12-26 PROCEDURE — 36415 COLL VENOUS BLD VENIPUNCTURE: CPT

## 2020-12-26 PROCEDURE — 85025 COMPLETE CBC W/AUTO DIFF WBC: CPT

## 2020-12-26 PROCEDURE — 84145 PROCALCITONIN (PCT): CPT

## 2020-12-26 PROCEDURE — 85384 FIBRINOGEN ACTIVITY: CPT

## 2020-12-26 PROCEDURE — 2060000000 HC ICU INTERMEDIATE R&B

## 2020-12-26 PROCEDURE — 94640 AIRWAY INHALATION TREATMENT: CPT

## 2020-12-26 PROCEDURE — 6360000002 HC RX W HCPCS: Performed by: INTERNAL MEDICINE

## 2020-12-26 PROCEDURE — 86140 C-REACTIVE PROTEIN: CPT

## 2020-12-26 PROCEDURE — 80053 COMPREHEN METABOLIC PANEL: CPT

## 2020-12-26 PROCEDURE — 83615 LACTATE (LD) (LDH) ENZYME: CPT

## 2020-12-26 PROCEDURE — 83036 HEMOGLOBIN GLYCOSYLATED A1C: CPT

## 2020-12-26 PROCEDURE — A9540 TC99M MAA: HCPCS | Performed by: INTERNAL MEDICINE

## 2020-12-26 RX ORDER — INSULIN GLARGINE 100 [IU]/ML
35 INJECTION, SOLUTION SUBCUTANEOUS NIGHTLY
Status: DISCONTINUED | OUTPATIENT
Start: 2020-12-26 | End: 2020-12-26

## 2020-12-26 RX ORDER — AMLODIPINE BESYLATE 10 MG/1
10 TABLET ORAL DAILY
Status: DISCONTINUED | OUTPATIENT
Start: 2020-12-26 | End: 2020-12-28 | Stop reason: HOSPADM

## 2020-12-26 RX ORDER — ATORVASTATIN CALCIUM 40 MG/1
40 TABLET, FILM COATED ORAL DAILY
Status: DISCONTINUED | OUTPATIENT
Start: 2020-12-26 | End: 2020-12-28 | Stop reason: HOSPADM

## 2020-12-26 RX ORDER — LANOLIN ALCOHOL/MO/W.PET/CERES
400 CREAM (GRAM) TOPICAL DAILY
Status: DISCONTINUED | OUTPATIENT
Start: 2020-12-26 | End: 2020-12-28 | Stop reason: HOSPADM

## 2020-12-26 RX ORDER — PANTOPRAZOLE SODIUM 20 MG/1
20 TABLET, DELAYED RELEASE ORAL DAILY
Status: DISCONTINUED | OUTPATIENT
Start: 2020-12-26 | End: 2020-12-28 | Stop reason: HOSPADM

## 2020-12-26 RX ORDER — INSULIN GLARGINE 100 [IU]/ML
70 INJECTION, SOLUTION SUBCUTANEOUS NIGHTLY
Status: DISCONTINUED | OUTPATIENT
Start: 2020-12-26 | End: 2020-12-26

## 2020-12-26 RX ORDER — LANOLIN ALCOHOL/MO/W.PET/CERES
3 CREAM (GRAM) TOPICAL NIGHTLY PRN
Status: DISCONTINUED | OUTPATIENT
Start: 2020-12-26 | End: 2020-12-28 | Stop reason: HOSPADM

## 2020-12-26 RX ORDER — ARIPIPRAZOLE 5 MG/1
5 TABLET ORAL DAILY
Status: DISCONTINUED | OUTPATIENT
Start: 2020-12-26 | End: 2020-12-28 | Stop reason: HOSPADM

## 2020-12-26 RX ORDER — DEXTROSE MONOHYDRATE 50 MG/ML
100 INJECTION, SOLUTION INTRAVENOUS PRN
Status: DISCONTINUED | OUTPATIENT
Start: 2020-12-26 | End: 2020-12-28 | Stop reason: HOSPADM

## 2020-12-26 RX ORDER — SODIUM CHLORIDE 0.9 % (FLUSH) 0.9 %
10 SYRINGE (ML) INJECTION PRN
Status: DISCONTINUED | OUTPATIENT
Start: 2020-12-26 | End: 2020-12-28 | Stop reason: HOSPADM

## 2020-12-26 RX ORDER — IPRATROPIUM BROMIDE AND ALBUTEROL SULFATE 2.5; .5 MG/3ML; MG/3ML
3 SOLUTION RESPIRATORY (INHALATION) EVERY 4 HOURS PRN
Status: DISCONTINUED | OUTPATIENT
Start: 2020-12-26 | End: 2020-12-28 | Stop reason: HOSPADM

## 2020-12-26 RX ORDER — ISOSORBIDE MONONITRATE 60 MG/1
60 TABLET, EXTENDED RELEASE ORAL DAILY
Status: DISCONTINUED | OUTPATIENT
Start: 2020-12-26 | End: 2020-12-28 | Stop reason: HOSPADM

## 2020-12-26 RX ORDER — ASPIRIN 81 MG/1
81 TABLET, CHEWABLE ORAL DAILY
Status: DISCONTINUED | OUTPATIENT
Start: 2020-12-26 | End: 2020-12-28 | Stop reason: HOSPADM

## 2020-12-26 RX ORDER — INSULIN GLARGINE 100 [IU]/ML
40 INJECTION, SOLUTION SUBCUTANEOUS NIGHTLY
Status: DISCONTINUED | OUTPATIENT
Start: 2020-12-27 | End: 2020-12-27

## 2020-12-26 RX ORDER — HEPARIN SODIUM 5000 [USP'U]/ML
5000 INJECTION, SOLUTION INTRAVENOUS; SUBCUTANEOUS EVERY 8 HOURS SCHEDULED
Status: DISCONTINUED | OUTPATIENT
Start: 2020-12-26 | End: 2020-12-28 | Stop reason: HOSPADM

## 2020-12-26 RX ORDER — GUAIFENESIN 600 MG/1
600 TABLET, EXTENDED RELEASE ORAL 2 TIMES DAILY
Status: DISCONTINUED | OUTPATIENT
Start: 2020-12-26 | End: 2020-12-28 | Stop reason: HOSPADM

## 2020-12-26 RX ORDER — FLUTICASONE PROPIONATE 110 UG/1
2 AEROSOL, METERED RESPIRATORY (INHALATION) 2 TIMES DAILY
Status: DISCONTINUED | OUTPATIENT
Start: 2020-12-26 | End: 2020-12-28 | Stop reason: HOSPADM

## 2020-12-26 RX ORDER — UBIDECARENONE 75 MG
100 CAPSULE ORAL DAILY
Status: DISCONTINUED | OUTPATIENT
Start: 2020-12-26 | End: 2020-12-28 | Stop reason: HOSPADM

## 2020-12-26 RX ORDER — NITROGLYCERIN 0.4 MG/1
0.4 TABLET SUBLINGUAL EVERY 5 MIN PRN
Status: DISCONTINUED | OUTPATIENT
Start: 2020-12-26 | End: 2020-12-28 | Stop reason: HOSPADM

## 2020-12-26 RX ORDER — ONDANSETRON 2 MG/ML
4 INJECTION INTRAMUSCULAR; INTRAVENOUS EVERY 6 HOURS PRN
Status: DISCONTINUED | OUTPATIENT
Start: 2020-12-26 | End: 2020-12-28 | Stop reason: HOSPADM

## 2020-12-26 RX ORDER — RANOLAZINE 500 MG/1
500 TABLET, EXTENDED RELEASE ORAL 2 TIMES DAILY
Status: DISCONTINUED | OUTPATIENT
Start: 2020-12-26 | End: 2020-12-28 | Stop reason: HOSPADM

## 2020-12-26 RX ORDER — DEXAMETHASONE 6 MG/1
6 TABLET ORAL DAILY
Status: DISCONTINUED | OUTPATIENT
Start: 2020-12-26 | End: 2020-12-28 | Stop reason: HOSPADM

## 2020-12-26 RX ORDER — DEXTROSE MONOHYDRATE 25 G/50ML
12.5 INJECTION, SOLUTION INTRAVENOUS PRN
Status: DISCONTINUED | OUTPATIENT
Start: 2020-12-26 | End: 2020-12-28 | Stop reason: HOSPADM

## 2020-12-26 RX ORDER — NICOTINE POLACRILEX 4 MG
15 LOZENGE BUCCAL PRN
Status: DISCONTINUED | OUTPATIENT
Start: 2020-12-26 | End: 2020-12-28 | Stop reason: HOSPADM

## 2020-12-26 RX ADMIN — Medication 10 ML: at 17:50

## 2020-12-26 RX ADMIN — TICAGRELOR 90 MG: 90 TABLET ORAL at 21:23

## 2020-12-26 RX ADMIN — ASPIRIN 81 MG CHEWABLE TABLET 81 MG: 81 TABLET CHEWABLE at 09:05

## 2020-12-26 RX ADMIN — INSULIN HUMAN 12 UNITS: 100 INJECTION, SOLUTION PARENTERAL at 01:14

## 2020-12-26 RX ADMIN — HEPARIN SODIUM 5000 UNITS: 5000 INJECTION INTRAVENOUS; SUBCUTANEOUS at 15:00

## 2020-12-26 RX ADMIN — ARIPIPRAZOLE 5 MG: 5 TABLET ORAL at 09:07

## 2020-12-26 RX ADMIN — AMLODIPINE BESYLATE 10 MG: 10 TABLET ORAL at 09:06

## 2020-12-26 RX ADMIN — INSULIN LISPRO 12 UNITS: 100 INJECTION, SOLUTION INTRAVENOUS; SUBCUTANEOUS at 12:59

## 2020-12-26 RX ADMIN — INSULIN HUMAN 10 UNITS: 100 INJECTION, SOLUTION PARENTERAL at 17:30

## 2020-12-26 RX ADMIN — HEPARIN SODIUM 5000 UNITS: 5000 INJECTION INTRAVENOUS; SUBCUTANEOUS at 06:37

## 2020-12-26 RX ADMIN — TICAGRELOR 90 MG: 90 TABLET ORAL at 09:05

## 2020-12-26 RX ADMIN — INSULIN GLARGINE 70 UNITS: 100 INJECTION, SOLUTION SUBCUTANEOUS at 22:38

## 2020-12-26 RX ADMIN — FLUTICASONE PROPIONATE 2 PUFF: 110 AEROSOL, METERED RESPIRATORY (INHALATION) at 08:50

## 2020-12-26 RX ADMIN — PREGABALIN 75 MG: 50 CAPSULE ORAL at 09:05

## 2020-12-26 RX ADMIN — GUAIFENESIN 600 MG: 600 TABLET, EXTENDED RELEASE ORAL at 09:05

## 2020-12-26 RX ADMIN — Medication 400 MG: at 09:06

## 2020-12-26 RX ADMIN — PANTOPRAZOLE SODIUM 20 MG: 20 TABLET, DELAYED RELEASE ORAL at 09:06

## 2020-12-26 RX ADMIN — VITAM B12 100 MCG: 100 TAB at 15:00

## 2020-12-26 RX ADMIN — DEXAMETHASONE 6 MG: 6 TABLET ORAL at 03:24

## 2020-12-26 RX ADMIN — HEPARIN SODIUM 5000 UNITS: 5000 INJECTION INTRAVENOUS; SUBCUTANEOUS at 21:34

## 2020-12-26 RX ADMIN — ONDANSETRON 4 MG: 2 INJECTION INTRAMUSCULAR; INTRAVENOUS at 03:24

## 2020-12-26 RX ADMIN — Medication 5.6 MILLICURIE: at 17:50

## 2020-12-26 RX ADMIN — ATORVASTATIN CALCIUM 40 MG: 40 TABLET, FILM COATED ORAL at 21:32

## 2020-12-26 RX ADMIN — FLUTICASONE PROPIONATE 2 PUFF: 110 AEROSOL, METERED RESPIRATORY (INHALATION) at 20:54

## 2020-12-26 RX ADMIN — ISOSORBIDE MONONITRATE 60 MG: 60 TABLET, EXTENDED RELEASE ORAL at 09:06

## 2020-12-26 RX ADMIN — IPRATROPIUM BROMIDE AND ALBUTEROL 1 PUFF: 20; 100 SPRAY, METERED RESPIRATORY (INHALATION) at 08:50

## 2020-12-26 RX ADMIN — GUAIFENESIN 600 MG: 600 TABLET, EXTENDED RELEASE ORAL at 21:23

## 2020-12-26 RX ADMIN — ONDANSETRON 4 MG: 2 INJECTION INTRAMUSCULAR; INTRAVENOUS at 15:00

## 2020-12-26 RX ADMIN — RANOLAZINE 500 MG: 500 TABLET, FILM COATED, EXTENDED RELEASE ORAL at 21:23

## 2020-12-26 RX ADMIN — INSULIN LISPRO 12 UNITS: 100 INJECTION, SOLUTION INTRAVENOUS; SUBCUTANEOUS at 09:10

## 2020-12-26 RX ADMIN — Medication 3 MG: at 21:32

## 2020-12-26 RX ADMIN — RANOLAZINE 500 MG: 500 TABLET, FILM COATED, EXTENDED RELEASE ORAL at 09:05

## 2020-12-26 ASSESSMENT — PAIN SCALES - GENERAL: PAINLEVEL_OUTOF10: 0

## 2020-12-26 NOTE — ACP (ADVANCE CARE PLANNING)
Advance Care Planning     Advance Care Planning Activator (Inpatient)  Conversation Note      Date of ACP Conversation: 12/25/2020    Conversation Conducted with: Patient with Decision Making Capacity   Healthcare Decision Maker: Named in Advance Directive or Healthcare Power of Yariel (name) daughter Bibi Villanueva ([phone])    ACP Activator: [facility] makes decisions on behalf of the incapacitated patient: Decision Maker is asked to consider and make decisions based on patient values, known preferences, or best interests. Health Care Decision Maker:     Current Designated Health Care Decision Maker:    Primary Decision Maker: Nelida Cuello - Mariama - [phone]  (If there is a [address] named in the [address] Makers\" box in the ACP activity, but it is not visible above, be sure to open that field and then select the health care decision maker relationship (ie \"primary\") in the blank space to the right of the name.) Validate  this information as still accurate & up-to-date; edit [address] field as needed.)    Note: Assess and validate information in current ACP documents, as indicated. Note: If the relationship of these Decision-Makers to the patient does NOT follow your state's Next of Kin hierarchy, recommend that patient complete ACP document that meets state-specific requirements to allow them to act on the patient's behalf when appropriate. Care Preferences    Ventilation: \"If you were in your present state of health and suddenly became very ill and were unable to breathe on your own, what would your preference be about the use of a ventilator (breathing machine) if it were available to you? \"      Would the patient desire the use of ventilator (breathing machine)?: yes - Short-term only    \"If your health worsens and it becomes clear that your chance of recovery is unlikely, what would your preference be about the use of a ventilator (breathing machine) if it were available to you? \"     Would the patient desire the use of ventilator (breathing machine)?: No      Resuscitation  \"CPR works best to restart the heart when there is a sudden event, like a heart attack, in someone who is otherwise healthy. Unfortunately, CPR does not typically restart the heart for people who have serious health conditions or who are very sick. \"    \"In the event your heart stopped as a result of an underlying serious health condition, would you want attempts to be made to restart your heart (answer \"yes\" for attempt to resuscitate) or would you prefer a natural death (answer \"no\" for do not attempt to resuscitate)? \" yes      NOTE: If the patient has a valid advance directive AND now provides care preference(s) that are inconsistent with that prior directive, advise the patient to consider either: creating a new advance directive that complies with state-specific requirements; or, if that is not possible, orally revoking that prior directive in accordance with state-specific requirements, which must be documented in the EHR. [x] Yes   [] No   Educated Patient / Jah Public regarding differences between Advance Directives and portable DNR orders.     Length of ACP Conversation in minutes:      Conversation Outcomes:  [x] ACP discussion completed  [x] Existing advance directive reviewed with patient; no changes to patient's previously recorded wishes  [] New Advance Directive completed  [] Portable Do Not Rescitate prepared for Provider review and signature  [] POLST/POST/MOLST/MOST prepared for Provider review and signature      Follow-up plan:    [] Schedule follow-up conversation to continue planning  [] Referred individual to Provider for additional questions/concerns   [] Advised patient/agent/surrogate to review completed ACP document and update if needed with changes in condition, patient preferences or care setting    [x] This note routed to one or more involved healthcare providers     NOTE: CM was unable to reach patient and spoke with daughter/POA to verify Advance Directives and patient's wishes re: vent/CPR.

## 2020-12-26 NOTE — FLOWSHEET NOTE
Patient arrived to unit via cart. Oriented patient to unit, room and call light. 2464- Admissions completed. Patient aox4. Denies cp, n/v.  Patient states some sob at times, patient stating at 100% on RA. Home medications reconciled, NP notified to restart. Patient   States has some nausea, notified NP for orders. Patient states lives at home with daughter, usually uses walker at home. [address] daughter did have covid. Patient 1 bag of belonging and shoes put in closet. Sugar rechecked of 335. Will continue to monitor.

## 2020-12-26 NOTE — ED PROVIDER NOTES
[address] [facility]  EMERGENCY DEPARTMENT ENCOUNTER      Pt Name: Amilcar Brooks  MRN: [id number]  [place] 1960  Date of evaluation: 12/25/2020  Provider: Romelia English PA-C    CHIEF COMPLAINT       Chief Complaint   Patient presents with    Hyperglycemia     high blood sugar today         HISTORY OF PRESENT ILLNESS   (Location/Symptom, Timing/Onset, Context/Setting, Quality, Duration, Modifying Factors, Severity)  Note limiting factors. Amilcar Brooks is a 56 y.o. female who presents to the emergency department with elevated blood sugars all day today. Patient states that her meter ran high. Patient did take 44 units of Lantus prior to coming to the emergency department. Patient states that she did have diarrhea yesterday but denies any abdominal pain, nausea or vomiting. Patient denies any known fevers, cough or congestion. Patient denies any chest pain or palpitations. Patient states that her typical sugars run in the 200s but yesterday they were at 300. Patient does complain of a vertiginous type of dizziness with the room spinning around her    HPI    Nursing Notes were reviewed. REVIEW OF SYSTEMS    (2-9 systems for level 4, 10 or more for level 5)     Review of Systems   Constitutional: Negative for diaphoresis and fever. HENT: Negative for hearing loss and trouble swallowing. Eyes: Negative for pain. Respiratory: Negative for apnea and shortness of breath. Cardiovascular: Negative for chest pain. Gastrointestinal: Positive for diarrhea. Negative for abdominal pain. Endocrine: Positive for polydipsia. Genitourinary: Negative for hematuria. Musculoskeletal: Negative for neck pain and neck stiffness. Skin: Negative for color change. Allergic/Immunologic: Negative. Neurological: Positive for dizziness. Negative for numbness. Hematological: Negative. Psychiatric/Behavioral: Negative. All other systems reviewed and are negative.       Except as noted above the remainder of the review of systems was reviewed and negative. PAST MEDICAL HISTORY     Past Medical History:   Diagnosis Date    Anxiety     CAD S/P percutaneous coronary angioplasty ,     stents per dr Kerry August    CKD (chronic kidney disease) stage 4, GFR 15-29 ml/min ([address] 2018    CKD stage 4 due to type 2 diabetes mellitus ([address]     Diabetic nephropathy with proteinuria ([address]     DJD (degenerative joint disease) of knee     Dr Oliver Adame GERD (gastroesophageal reflux disease)     Hemiparesis, left ([address] 2013    [facility] Assisted Living ([facility])    History of heart failure     History of seizures     History of type C viral hepatitis     HTN (hypertension)     Hyperlipidemia     Impaired mobility and activities of daily living     Mediastinal lymphadenopathy     Rachele Lovelace    Metabolic syndrome     Moderate persistent asthma without complication     Neurogenic urinary incontinence 2013    Neuropathy in diabetes ([address]     Obesity (BMI 30-39. 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 Fuentes Dura      x1 Dr. Anabel Mcnamara, Dr Rony Bond CATH LAB PROCEDURE  10/02/2019    HYSTERECTOMY, TOTAL ABDOMINAL      one ovary intact, Dr Williamson Degree, menorrhagia    TX TOTAL KNEE ARTHROPLASTY Left 6/21/2018    LEFT KNEE TOTAL KNEE ARTHROPLASTY, SHAYNA, NERVE BLOCK performed by Tani Mejias MD at [address] [place] [address] Right     TOTAL KNEE ARTHROPLASTY  05/19/16    Dr Law Record TUNNELED [address] Right 07/01/2020    tunneled HD catheter per Dr Hdz Half       Previous Medications    ALBUTEROL SULFATE HFA (VENTOLIN HFA) 108 (90 BASE) MCG/ACT INHALER    Inhale 2 puffs into the lungs every 6 hours as needed for Wheezing    AMLODIPINE (NORVASC) 10 MG TABLET    TAKE 1 TABLET BY MOUTH DAILY    ARIPIPRAZOLE (ABILIFY) 5 MG TABLET    TAKE 1 TABLET BY MOUTH DAILY    ASPIRIN 81 MG TABLET    Take 1 tablet by mouth daily    ATORVASTATIN (LIPITOR) 40 MG TABLET    Take 1 tablet by mouth daily    B COMPLEX-C-FOLIC ACID (NEPHRO VITAMINS) 0.8 MG TABS    Take by mouth    BLOOD GLUCOSE MONITORING SUPPL (FREESTYLE LITE) CORETTA    1 Device by Does not apply route daily as needed Use freestyle meter to test blood sugar as needed    BRILINTA 90 MG TABS TABLET    TAKE 1 TABLET BY MOUTH 2 TIMES DAILY    D3-1000 25 MCG (1000 UT) CAPS    TAKE 1 CAPSULE BY MOUTH ONCE DAILY    FLUCONAZOLE (DIFLUCAN) 100 MG TABLET    Take 1 tablet by mouth daily for 14 days    FLUTICASONE (FLOVENT HFA) 110 MCG/ACT INHALER    Inhale 2 puffs into the lungs 2 times daily    FREESTYLE LITE STRIP    TEST FOUR TIMES DAILY BEFORE MEALS AND NIGHTLY    INSULIN ASPART (NOVOLOG FLEXPEN) 100 UNIT/ML INJECTION PEN    INJECT 18 UNITS SUBCUTANEOUSLY BEFORE BREAKFAST AND DINNER AND 8 UNITS BEFORE LUNCH    IPRATROPIUM-ALBUTEROL (DUONEB) 0.5-2.5 (3) MG/3ML SOLN NEBULIZER SOLUTION    Inhale 3 mLs into the lungs every 4 hours as needed for Shortness of Breath    ISOSORBIDE MONONITRATE (IMDUR) 60 MG EXTENDED RELEASE TABLET    Take 1 tablet by mouth daily    LANTUS SOLOSTAR 100 UNIT/ML INJECTION PEN    INJECT 35 UNITS SUBCUTANEOUSLY AT NIGHT MAGNESIUM OXIDE (MAG-OX) 400 MG TABLET    Take 400 mg by mouth daily    MAGNESIUM OXIDE -MG SUPPLEMENT 400 MG CAPS        MELATONIN 3 MG TABS TABLET    TAKE 1 TABLET BY MOUTH EVERY NIGHT AS NEEDED FOR INSOMNIA    MISC. DEVICES [facility]) MISC    To use with transport outside of the house.     NITROGLYCERIN (NITROSTAT) 0.4 MG SL TABLET    Place 1 tablet under the tongue every 5 minutes as needed for Chest pain    NYSTATIN (NYAMYC) 784629 UNIT/GM POWDER    APPLY TO ABDOMINAL FOLDS EVERY 12 HOURS AS NEEDED    PANTOPRAZOLE (PROTONIX) 20 MG TABLET    TAKE 1 TABLET BY MOUTH DAILY    PREGABALIN (LYRICA) 75 MG CAPSULE    TAKE ONE (1) CAPSULE BY MOUTH TWICE DAILY    RANOLAZINE (RANEXA) 500 MG EXTENDED RELEASE TABLET    Take 1 tablet by mouth 2 times daily    SERTRALINE (ZOLOFT) 50 MG TABLET    TAKE 1 TABLET BY MOUTH DAILY    SURE COMFORT PEN NEEDLES 30G X 8 MM MISC    USE AS DIRECTED FIVE TIMES A DAY    TRIAMCINOLONE (KENALOG) 0.1 % CREAM    APPLY TO AFFECTED AREA TWICE DAILY AS DIRECTED    VITAMIN B-12 (CYANOCOBALAMIN) 100 MCG TABLET    TAKE 1 TABLET BY MOUTH DAILY    ZOSTER RECOMBINANT ADJUVANTED VACCINE (SHINGRIX) 50 MCG/0.5ML SUSR INJECTION    Inject 0.5 mLs into the muscle See Admin Instructions 1 dose now and repeat in 2-6 months       ALLERGIES     Codeine and Oxycontin [oxycodone hcl]    FAMILY HISTORY       Family History   Problem Relation Age of Onset    Cancer Mother 76        survived   Rubin Garcia Hypertension Father     Diabetes Sister     Mental Illness Sister           SOCIAL HISTORY       Social History     Socioeconomic History    Marital status:      Spouse name: None    Number of children: 2    Years of education: None    Highest education level: None   Occupational History    Occupation: disabled   Social Needs    Financial resource strain: None    Food insecurity     Worry: None     Inability: None    Transportation needs     Medical: None     Non-medical: None   Tobacco Use    Smoking status: Passive Smoke Exposure - Never Smoker    Smokeless tobacco: Never Used   Substance and Sexual Activity    Alcohol use: No     Alcohol/week: 0.0 standard drinks    Drug use: No    Sexual activity: Not Currently   Lifestyle    Physical activity     Days per week: None     Minutes per session: None    Stress: None   Relationships    Social connections     Talks on phone: None     Gets together: None     Attends Latter-day service: None     Active member of club or organization: None     Attends meetings of clubs or organizations: None     Relationship status: None    Intimate partner violence     Fear of current or ex partner: None     Emotionally abused: None     Physically abused: None     Forced sexual activity: None   Other Topics Concern    None   Social History Narrative    Born in [place], one of 5    Twin sister Reyes, very ill in 2018, [place] [address]    Moved to [facility], , 2 children, one son and one daughter    Worked at [employer], as a nurse's aide    Disabled due to mental illness    Lived at [employer], was discharged, returned to independent living in 2017 in the daughter's house and has adjusted well    One son and one daughter, live in the same house with patient, Kayley Aguayo pays the rent    [employer] reading (misteries)       SCREENINGS        [place] Coma Scale  Best Verbal Response: Oriented  Best Motor Response: Obeys commands               PHYSICAL EXAM    (up to 7 for level 4, 8 or more for level 5)     ED Triage Vitals   BP Temp Temp src Pulse Resp SpO2 Height Weight   -- -- -- -- -- -- -- --       Physical Exam  Vitals signs and nursing note reviewed. Constitutional:       General: She is not in acute distress. Appearance: She is well-developed. She is not diaphoretic. HENT:      Head: Normocephalic and atraumatic. Mouth/Throat:      Mouth: Mucous membranes are dry. Pharynx: No oropharyngeal exudate.    Eyes:      General: No scleral icterus. Conjunctiva/sclera: Conjunctivae normal.      Pupils: Pupils are equal, round, and reactive to light. Neck:      Musculoskeletal: Normal range of motion and neck supple. Trachea: No tracheal deviation. Cardiovascular:      Rate and Rhythm: Normal rate. Heart sounds: Normal heart sounds. Pulmonary:      Effort: Pulmonary effort is normal. No respiratory distress. Breath sounds: Normal breath sounds. Abdominal:      General: Bowel sounds are normal. There is no distension. Palpations: Abdomen is soft. Musculoskeletal: Normal range of motion. Skin:     General: Skin is warm and dry. Findings: No erythema or rash. Neurological:      Mental Status: She is alert and oriented to person, place, and time. Cranial Nerves: No cranial nerve deficit. Motor: No abnormal muscle tone. Psychiatric:         Behavior: Behavior normal.         Thought Content:  Thought content normal.         Judgment: Judgment normal.         DIAGNOSTIC RESULTS     EKG: All EKG's are interpreted by the Emergency Department Physician who either signs or Co-signs this chart in the absence of a cardiologist.    Normal sinus rhythm with first-degree AV block, rate 71 bpm, right bundle branch block with left anterior fascicular block    RADIOLOGY:   Non-plain film images such as CT, Ultrasound and MRI are read by the radiologist. Plain radiographic images are visualized and preliminarily interpreted by the emergency physician with the below findings:    Neg    Interpretation per the Radiologist below, if available at the time of this note:    XR CHEST PORTABLE    (Results Pending)         ED BEDSIDE ULTRASOUND:   Performed by ED Physician - none    LABS:  Labs Reviewed   COMPREHENSIVE METABOLIC PANEL - Abnormal; Notable for the following components:       Result Value    Sodium 127 (*)     Chloride 82 (*)     Anion Gap 17 (*)     Glucose 444 (*)     BUN 41 (*)     CREATININE 5.57 (*)     GFR Base Excess, Gomez 5 (*)     Lactate 5.41 (*)     Hemoglobin 8.2 (*)     POC Hematocrit 24 (*)     All other components within normal limits   CK    Narrative:     CALL  [facility] tel. [phone],  Glucose results called to and read back by remberto galloway, 12/25/2020 23:57, by  [facility]  Troponin results called to and read back by remberto galloway, 12/25/2020 23:56, by  [facility]   URINE RT REFLEX TO CULTURE   BETA-HYDROXYBUTYRATE   POCT EPOC BLOOD GAS, LACTIC ACID, ICA       All other labs were within normal range or not returned as of this dictation. EMERGENCY DEPARTMENT COURSE and DIFFERENTIAL DIAGNOSIS/MDM:   Vitals:    Vitals:    12/25/20 2316 12/26/20 0000 12/26/20 0030   BP: (!) 115/56  (!) 112/56   Pulse: 75 71 74   Resp: 20  13   Temp: 98 °F (36.7 °C)     TempSrc: Oral     SpO2: 97%  100%   Weight: 217 lb (98.4 kg)     Height: 5' 7\" (1.702 m)       Presented the emergency department with elevated blood sugars for the past 2 days as well as diarrhea over the past 2 days as well. Patient is Covid positive. Patient has a significant lactic acidosis, dehydration but no signs of DKA today. Discussed with the hospitalist will admit the patient for further evaluation and care      MDM      REASSESSMENT          CONSULTS:  None    PROCEDURES:  Unless otherwise noted below, none     Procedures        FINAL IMPRESSION      1. COVID-19    2. Hyperglycemia          DISPOSITION/PLAN   DISPOSITION Admitted 12/26/2020 12:26:43 AM      PATIENT REFERRED TO:  No follow-up provider specified. DISCHARGE MEDICATIONS:  New Prescriptions    No medications on file     Controlled Substances Monitoring:     RX Monitoring 11/18/2019   Attestation -   Acute Pain Prescriptions Prescription exceeds daily limit for a specific reason. See comments or note. Periodic Controlled Substance Monitoring No signs of potential drug abuse or diversion identified.        (Please note that portions of this note were completed with a voice recognition program.  Efforts were made to edit the dictations but occasionally words are mis-transcribed.)    Addis Kay PA-C (electronically signed)  Attending Emergency Physician            Addis Kay PA-C  12/26/20 2717

## 2020-12-26 NOTE — ED TRIAGE NOTES
Patient presents with complaints of high blood sugar today. Patient states her blood sugar meter read \"high\" at home. Patient took 44 units of Lantus prior to EMS arrival. Patient's only complaints is dizziness off and on today. Skin is pink, warm, and dry. Respirations equal and unlabored.  No distress noted on arrival.

## 2020-12-26 NOTE — CARE COORDINATION
[facility] Case Management Initial Discharge Assessment    Spoke with patient's daughter/RIKA Alfaro to discuss patient's care needs and discharge plan (patient did not answer phone in room on 5W when CM called). PCP: Ceferino Mederos MD                                  Date of Last Visit: Virtual visit 12/24/20    If no PCP, list provided? N/A    Discharge Planning    Living Arrangements: Patient lives at home dependent on family care . Who do you live with? Daughter Opal Chen and her family. Who helps you with your care: Family - [facility] aide care each week through [address] [place] [address]. Daughter provides this HHA care. Patient requires assistance with all ADLs and IADLs. If lives at home:     Do you have any barriers navigating in your home? yes - Daughter assists. Patient can perform ADL? No - requires assist with all care. Current Services (outpatient and in home) :  [facility] - Will require an order to continue at [place]. Dialysis: Yes, Location [facility] [place] ([facility]), Chair Time has varied recently. Is transportation available to get to your appointments? Yes - Patient receives transportation through [employer] to appointments and dialysis treatments. DME Equipment:  Patient uses a walker, requires incontinence products. Respiratory equipment: None    Respiratory provider:  REBECCA     Pharmacy:  Long-term meds: [facility]; Short-term meds [facility]    Consult with Medication Assistance Program?  No      Patient agreeable to [facility] AT [facility]? Yes, [address]    Patient agreeable to SNF/Rehab? Patient has 24 hour care/HHA assist at home provided by her daughter. Patient has previously been at [facility] for rehab, discharged in Summer 2020. Other discharge needs identified? Other - [place].     Freedom of choice list provided with basic dialogue that supports the patient's

## 2020-12-26 NOTE — PLAN OF CARE
Problem: Airway Clearance - Ineffective  Goal: Achieve or maintain patent airway  Outcome: Met This Shift     Problem: Gas Exchange - Impaired  Goal: Absence of hypoxia  Outcome: Met This Shift  Goal: Promote optimal lung function  Outcome: Met This Shift     Problem: Breathing Pattern - Ineffective  Goal: Ability to achieve and maintain a regular respiratory rate  Outcome: Met This Shift     Problem:  Body Temperature -  Risk of, Imbalanced  Goal: Ability to maintain a body temperature within defined limits  Outcome: Met This Shift  Goal: Will regain or maintain usual level of consciousness  Outcome: Met This Shift  Goal: Complications related to the disease process, condition or treatment will be avoided or minimized  Outcome: Met This Shift     Problem: Nutrition Deficits  Goal: Optimize nutrtional status  Outcome: Met This Shift

## 2020-12-26 NOTE — ED NOTES
Bed: 16  Expected date: 12/25/20  Expected time: 10:59 PM  Means of arrival: [facility]  Comments:  59 F high blood sugar. Alert and orientated x 4.       Karyn Napoles RN  12/25/20 4115

## 2020-12-26 NOTE — PROGRESS NOTES
seen and examined. Agree with Dr Edis Fraga A/P. Will follow daily. Anticipate dc tomorrow after HD. Renal and endo on board.

## 2020-12-26 NOTE — FLOWSHEET NOTE
12/26/2020 @ 0401 [facility] Dr. Nikolas Epperson is here & aware of consult # [phone]    12/26/2020 @ 5203 Notified Dr. Dioni Liu of Endocrinology consult via [address] [place] [address]

## 2020-12-26 NOTE — CARE COORDINATION
[facility] Case Management Initial Discharge Assessment    Met with Patient to discuss discharge plan. PCP: Saúl Longoria MD                                Date of Last Visit: DEC. 24TH    If no PCP, list provided? N/A    Discharge Planning    Living Arrangements: at home dependent on family care    Who do you live with? DAUGHTER MICHAEL    Who helps you with your care:  family    If lives at home:     Do you have any barriers navigating in your home? no    Patient can perform ADL? Yes    Current Services (outpatient and in home) :  [address] [facility] ([address] [facility])    Dialysis: Yes, Location [facility], Chair Time T,TH,SAT    Is transportation available to get to your appointments? Yes    DME Equipment:  yes - WALKER WITH SEAT, BUT SEAT IS BROKEN PER DTR    Respiratory equipment: None    Respiratory provider:  no     Pharmacy:  yes - [address] with Medication Assistance Program?  No      Patient agreeable to [facility] AT [facility]? Yes, [address] [facility]    Patient agreeable to SNF/Rehab? Declined    Other discharge needs identified? N/A    Freedom of choice list provided with basic dialogue that supports the patient's individualized plan of care/goals and shares the quality data associated with the providers. Yes    Does Patient Have a High-Risk for Readmission Diagnosis (CHF, PN, MI, COPD)? No      The plan for Transition of Care is related to the following treatment goals:MEDS, VITALS    Initial Discharge Plan? (Note: please see concurrent daily documentation for any updates after initial note).     RETURN HOME WITH DTR    The Patient and/or patient representative: Russell Johnson was provided with choice of any post-acute providers for care and equipment and agrees with discharge plan  Yes    Electronically signed by Merari Burnett RN on 12/26/2020 at 5:02 PM

## 2020-12-26 NOTE — PROGRESS NOTES
[facility] [place] Respiratory Therapy Evaluation   Current Order:  q6h      Home Regimen:  prn     Ordering Physician: chel  Re-evaluation Date:  n/a     Diagnosis: covid      Patient Status: Stable / Unstable + Physician notified    The following MDI Criteria must be met in order to convert aerosol to MDI with spacer. If unable to meet, MDI will be converted to aerosol:  []  Patient able to demonstrate the ability to use MDI effectively  []  Patient alert and cooperative  []  Patient able to take deep breath with 5-10 second hold  []  Medication(s) available in this delivery method   []  Peak flow greater than or equal to 200 ml/min            Current Order Substituted To  (same drug, same frequency)   Aerosol to MDI [] Albuterol Sulfate 0.083% unit dose by aerosol Albuterol Sulfate MDI 2 puffs by inhalation with spacer    [] Levalbuterol 1.25 mg unit dose by aerosol Levalbuterol MDI 2 puffs by inhalation with spacer    [] Levalbuterol 0.63 mg unit dose by aerosol Levalbuterol MDI 2 puffs by inhalation with spacer    [] Ipratropium Bromide 0.02% unit dose by aerosol Ipratropium Bromide MDI 2 puffs by inhalation with spacer    [] Duoneb (Ipratropium + Albuterol) unit dose by aerosol Ipratropium MDI + Albuterol MDI 2 puffs by inhalation w/spacer   MDI to Aerosol [] Albuterol Sulfate MDI Albuterol Sulfate 0.083% unit dose by aerosol    [] Levalbuterol MDI 2 puffs by inhalation Levalbuterol 1.25 mg unit dose by aerosol    [] Ipratropium Bromide MDI by inhalation Ipratropium Bromide 0.02% unit dose by aerosol    [] Combivent (Ipratropium + Albuterol) MDI by inhalation Duoneb (Ipratropium + Albuterol) unit dose by aerosol   Treatment Assessment [Frequency/Schedule]:  Change frequency to: ____________Q4PRN______________________________________per Protocol, P&T, MEC      Points 0 1 2 3 4   Pulmonary Status  Non-Smoker  [x]   Smoking history   < 20 pack years  []   Smoking history  ?  20 pack years  []   Pulmonary Disorder  (acute or chronic)  []   Severe or Chronic w/ Exacerbation  []     Surgical Status No [x]   Surgeries     General []   Surgery Lower []   Abdominal Thoracic or []   Upper Abdominal Thoracic with  PulmonaryDisorder  []     Chest X-ray Clear/Not  Ordered     []  Chronic Changes  Results Pending  []  Infiltrates, atelectasis, pleural effusion, or edema  [x]  Infiltrates in more than one lobe []  Infiltrate + Atelectasis, &/or pleural effusion  []    Respiratory Pattern Regular,  RR = 12-20 [x]  Increased,  RR = 21-25 []  GONZALEZ, irregular,  or RR = 26-30 []  Decreased FEV1  or RR = 31-35 []  Severe SOB, use  of accessory muscles, or RR ? 35  []    Mental Status Alert, oriented,  Cooperative [x]  Confused but Follows commands []  Lethargic or unable to follow commands []  Obtunded  []  Comatose  []    Breath Sounds Clear to  auscultation  [x]  Decreased unilaterally or  in bases only []  Decreased  bilaterally  []  Crackles or intermittent wheezes []  Wheezes []    Cough Strong, Spontan., & nonproductive [x]  Strong,  spontaneous, &  productive []  Weak,  Nonproductive []  Weak, productive or  with wheezes []  No spontaneous  cough or may require suctioning []    Level of Activity Ambulatory []  Ambulatory w/ Assist  [x]  Non-ambulatory []  Paraplegic []  Quadriplegic []    Total    Score:___3____     Triage Score:__5______      Tri       Triage:     1. (>20) Freq: Q3    2. (16-20) Freq: Q4   3. (11-15) Freq: QID & Albuterol Q2 PRN    4. (6-10) Freq: TID & Albuterol Q2 PRN    5. (0-5) Freq Q4prn

## 2020-12-26 NOTE — CONSULTS
[facility]  Consult Note           Reason for Consult:  ESRD management   Requesting Physician:  Dr. Asha Ramso     Chief Complaint:  Weakness, lethargy, hyperglycemia  History Obtained From:  patient, electronic medical record    History of Present Ilness:    56y.o. year old female with history s/f ESRd on IHD TTS, CAD s/p DEWEY, CHF, T2DM, GERD, asthma and schizophrenia who presented for persistent hyperglycemia and lethargy. Has only had enough strength to go to HD. On presentation pt diagnosed w/ COVID (had exposure to granddaughter who had it ~3 weeks ago). BG in the 4-55 range (pt asking to be seen by Peña Garcia). CXR showing ill defined RUL opacity. Recommended to get PA/lateral. Pt currently not on O2. Past Medical History:        Diagnosis Date    Anxiety     CAD S/P percutaneous coronary angioplasty 2015, 2018    stents per dr Andrade July    CHF (congestive heart failure) ([address]     CKD (chronic kidney disease) stage 4, GFR 15-29 ml/min ([address] 2/24/2018    CKD stage 4 due to type 2 diabetes mellitus ([address]     COPD (chronic obstructive pulmonary disease) ([address]     Diabetic nephropathy with proteinuria ([address] 2014    DJD (degenerative joint disease) of knee     Dr Rina Boyle GERD (gastroesophageal reflux disease)     Hemiparesis, left ([address] 2013    entered Assisted Living ([facility])    Hemod[facility] patient [facility])     History of heart failure     History of seizures     History of type C viral hepatitis     HTN (hypertension)     Hyperlipidemia     Impaired mobility and activities of daily living     Mediastinal lymphadenopathy 2013    Dayanna Reilly    Metabolic syndrome     Moderate persistent asthma without complication 8/02/1253    Neurogenic urinary incontinence 2013    Neuropathy in diabetes ([address]     Obesity (BMI 30-39. 9)     Recurrent UTI     S/P colonoscopy 2014    [facility], focal active colitis    Schizophrenia, paranoid, chronic ([address]     [facility] (CYANOCOBALAMIN) 100 MCG tablet TAKE 1 TABLET BY MOUTH DAILY 30 tablet 10    nystatin (NYAMYC) 489850 UNIT/GM powder APPLY TO ABDOMINAL FOLDS EVERY 12 HOURS AS NEEDED 60 g 2    B Complex-C-Folic Acid (NEPHRO VITAMINS) 0.8 MG TABS Take by mouth daily       sertraline (ZOLOFT) 50 MG tablet TAKE 1 TABLET BY MOUTH DAILY 90 tablet 3    insulin aspart (NOVOLOG FLEXPEN) 100 UNIT/ML injection pen INJECT 18 UNITS SUBCUTANEOUSLY BEFORE BREAKFAST AND DINNER AND 8 UNITS BEFORE LUNCH 15 mL 10    LANTUS SOLOSTAR 100 UNIT/ML injection pen INJECT 35 UNITS SUBCUTANEOUSLY AT NIGHT (Patient taking differently: Indications: 44 units ) 15 mL 10    isosorbide mononitrate (IMDUR) 60 MG extended release tablet Take 1 tablet by mouth daily 90 tablet 3    amLODIPine (NORVASC) 10 MG tablet TAKE 1 TABLET BY MOUTH DAILY 90 tablet 1    aspirin 81 MG tablet Take 1 tablet by mouth daily 90 tablet 3    SURE COMFORT PEN NEEDLES 30G X 8 MM MISC USE AS DIRECTED FIVE TIMES A  each 10    FREESTYLE LITE strip TEST FOUR TIMES DAILY BEFORE MEALS AND NIGHTLY 150 strip 3    albuterol sulfate HFA (VENTOLIN HFA) 108 (90 Base) MCG/ACT inhaler Inhale 2 puffs into the lungs every 6 hours as needed for Wheezing      fluticasone (FLOVENT HFA) 110 MCG/ACT inhaler Inhale 2 puffs into the lungs 2 times daily (Patient taking differently: Inhale 2 puffs into the lungs 2 times daily as needed ) 1 Inhaler 12    [employer] [facility] To use with transport outside of the house.  1 each 0    Magnesium Oxide -Mg Supplement 400 MG CAPS       ipratropium-albuterol (DUONEB) 0.5-2.5 (3) MG/3ML SOLN nebulizer solution Inhale 3 mLs into the lungs every 4 hours as needed for Shortness of Breath 360 mL 0    Blood Glucose Monitoring Suppl (FREESTYLE LITE) CORETTA 1 Device by Does not apply route daily as needed Use freestyle meter to test blood sugar as needed      atorvastatin (LIPITOR) 40 MG tablet Take 1 tablet by mouth daily 90 tablet 3    melatonin 3 MG TABS tablet TAKE 1 TABLET BY MOUTH EVERY NIGHT AS NEEDED FOR INSOMNIA 180 tablet 4    nitroGLYCERIN (NITROSTAT) 0.4 MG SL tablet Place 1 tablet under the tongue every 5 minutes as needed for Chest pain      magnesium oxide (MAG-OX) 400 MG tablet Take 400 mg by mouth daily         Allergies:  Codeine and Oxycontin [oxycodone hcl]    Social History:    Social History     Socioeconomic History    Marital status:      Spouse name: Not on file    Number of children: 2    Years of education: Not on file    Highest education level: Not on file   Occupational History    Occupation: disabled   Social Needs    Financial resource strain: Not on file    Food insecurity     Worry: Not on file     Inability: Not on file   [employer] needs     Medical: Not on file     Non-medical: Not on file   Tobacco Use    Smoking status: Passive Smoke Exposure - Never Smoker    Smokeless tobacco: Never Used   Substance and Sexual Activity    Alcohol use: No     Alcohol/week: 0.0 standard drinks    Drug use: No    Sexual activity: Not Currently   Lifestyle    Physical activity     Days per week: Not on file     Minutes per session: Not on file    Stress: Not on file   Relationships    Social connections     Talks on phone: Not on file     Gets together: Not on file     Attends [employer] service: Not on file     Active member of club or organization: Not on file     Attends meetings of clubs or organizations: Not on file     Relationship status: Not on file    Intimate partner violence     Fear of current or ex partner: Not on file     Emotionally abused: Not on file     Physically abused: Not on file     Forced sexual activity: Not on file   Other Topics Concern    Not on file   Social History Narrative    Born in [place], one of 5    Twin sister Reyes, very ill in 2018, [place] [address]    Moved to [facility], [place], , 2 children, one son and one daughter    Worked at [employer], as a nurse's aide    Disabled due to mental illness    Lived at [facility], was discharged, returned to independent living in 2017 in the daughter's house and has adjusted well    One son and one daughter, live in the same house with patient, Eduardo Schofield pays the rent    [employer] reading ([employer])       Family History:   Family History   Problem Relation Age of Onset    Cancer Mother 76        survived   [facility] Hypertension Father     Diabetes Sister     Mental Illness Sister        Review of Systems:   Positives in bold  Constitutional: fever, chills, fatigue, malaise   HENT:  rhinorrhea, sinus pain, sore throat, epistaxis    Eyes:  photophobia, visual disturbance, eye redness  Respiratory: shortness of breath, cough, hemoptysis    Cardiovascular: chest pain, palpitations, orthopnea, leg swelling   Gastrointestinal: abdominal pain, nausea, vomiting, diarrhea, constipation   Endocrine: polydipsia, polyphagia, cold intolerance, heat intolerance  Genitourinary: dysuria, flank pain, frequency, urgency   Hematologic: easy bruising, easy bleeding  Musculoskeletal: back pain, neck pain, myalgias, arthalgias  Neurological: syncope, lightheadedness, dizziness, seizures, tremors, weakness  Psychiatric/Behavioral: anxiety, depression, hallucinations  Skin: rash, itching    Physical exam:   Constitutional:  VITALS:  BP (!) 122/51   Pulse 76   Temp 97.7 °F (36.5 °C) (Oral)   Resp 18   Ht 5' 7\" (1.702 m)   Wt 217 lb (98.4 kg)   LMP  (LMP Unknown)   SpO2 97%   BMI 33.99 kg/m²     General: alert, in no apparent distress  HEENT: normocephalic, atraumatic, anicteric  Neck: supple, no mass  Lungs: non-labored respirations, clear to auscultation bilaterally  Heart: regular rate and rhythm, no murmurs or rubs  Abdomen: soft, non-tender, non-distended  MSK: no joint swelling or tenderness  Ext: no cyanosis, no peripheral edema  Neuro: alert and oriented, no gross abnormalities  Psych: normal mood and affect    Data/  CBC:   Lab Results Component Value Date    WBC 7.2 12/26/2020    RBC 2.32 12/26/2020    HGB 7.5 12/26/2020    HCT 20.8 12/26/2020    MCV 89.8 12/26/2020    MCH 32.2 12/26/2020    MCHC 35.9 12/26/2020    RDW 14.8 12/26/2020     12/26/2020    MPV 10.0 03/05/2020     BMP:    Lab Results   Component Value Date     12/26/2020    K 3.8 12/26/2020    CL 90 12/26/2020    CO2 25 12/26/2020    BUN 44 12/26/2020    LABALBU 3.1 12/26/2020    CREATININE 5.53 12/26/2020    CALCIUM 7.5 12/26/2020    GFRAA 9.4 12/26/2020    LABGLOM 7.8 12/26/2020    GLUCOSE 401 12/26/2020    GLUCOSE 102 10/16/2020     Xr Chest Portable    Result Date: 12/26/2020  COMPARISON: 6/14/20; 6/28/20 HISTORY: hyperglycemia TECHNIQUE: AP view FINDINGS: Ill defined Opacity right upper lobe. The cardiac silhouette is enlarged. . The bony structures are grossly intact. Recommend PA and Lateral to assess opacity RUL. Could be overlapping ribs. Ir Remove Tunneled Cvad Wo Sq Port/pump    1. Successful removal of a tunneled dialysis catheter from the right chest and internal jugular vein. DIAGNOSIS: Patient with previous renal failure, receiving dialysis through central venous tunneled catheter. Patient no longer requires the catheter. RADIATION DOSE: 0.49 mGy FINDINGS: Patient was placed supine on the procedure table. The chest and neck including the existing tunneled catheter were prepped and draped in sterile fashion. Lidocaine was used to anesthetize the skin tunnel site. Hemostats were used to dissect the Dacron anchor cuff from the surrounding tissue. Under gentle traction and while holding pressure at the neck venotomy site, the catheter was easily removed. The catheter was checked for any missing components, catheter was intact. Pressure was held at the neck until hemostasis was achieved. Sterile dressing was applied. Patient tolerated the procedure well without any complications. Patient was discharged home in normal and stable condition. Assessment:  56y.o. year old female with history s/f ESRd on IHD TTS, CAD s/p DEWEY, CHF, T2DM, GERD, asthma and schizophrenia who presented for persistent hyperglycemia and lethargy. 1. ESRD on IHD TTS, pt of Dr. Kerwin Spurling at [facility]   2. Covid infection   3. HTN  4. Persistent hyperglycemia: endocrinology consulted  5. Renal anemia  6. Hyponatremia  7. Secondary renal hyperparathyroidism: on sensipar     Plan:  - will resume HD TThSun starting tomorrow  - checking phos and iron studies    Thank you for the consultation. Will continue to follow  Please do not hesitate to call with questions.     Nikunj Nick MD

## 2020-12-26 NOTE — ED NOTES
Patient resting on cart with eyes closed. No distress observed.      Amandeep Scherer RN  12/26/20 [id number]

## 2020-12-26 NOTE — H&P
Hospital Medicine  History and Physical    Patient:  Bridger Gasca  MRN: [id number]    CHIEF COMPLAINT:    Chief Complaint   Patient presents with    Hyperglycemia     high blood sugar today       History Obtained From:  patient, electronic medical record  Primary Care Physician: Ceferino Mederos MD    HISTORY OF PRESENT ILLNESS:   The patient is a 56 y.o. female who presents with persistent hyperglycemia weakness fatigue and sleeping 12 to 16 hours daily. Patient states that for the past 4 days or so she has had severe weakness and is only been able to get up to go to dialysis. Her family was very concerned brought her to the hospital for evaluation. Patient has been exposed to coronavirus her granddaughter was diagnosed 3 weeks previously on arrival to the ED she tested positive as well. Patient has had a persistent hyperglycemia in the 4-5 100s for the past several days she was also noted to have an elevated lactic acid of 5.41 and dyspnea with significant exertion. Basic metabolic panel shows a hyponatremia sodium 127 BUN 41 creatinine 5.57 calcium 0.0 and a troponin of 0.024 however the patient does have end-stage renal disease and undergoes hemodialysis schedule which has been modified for Christmas she has neck scheduled for Sunday. Patient's glucose was significantly elevated in the emergency department and she was started on insulin infusion however she responded with aggressive high-dose subcu insulin in the ED was able to be transferred to the Covid floor. The patient is currently saturating well on room air and admits only toasts dyspnea with significant exertion. Chest x-ray does show bilateral cephalization of the vasculature but soft tissue overlay obscuring clear imaging of the lung bases however there is no clear focal infiltrates suggestive of an acute bacterial pneumonia.   Read is pending    Past Medical History:      Diagnosis Date    Anxiety     CAD S/P percutaneous coronary angioplasty , 2018    stents per dr Taj Vásquez    CHF (congestive heart failure) ([address]     CKD (chronic kidney disease) stage 4, GFR 15-29 ml/min ([address] 2018    CKD stage 4 due to type 2 diabetes mellitus ([address]     COPD (chronic obstructive pulmonary disease) ([address]     Diabetic nephropathy with proteinuria ([address]     DJD (degenerative joint disease) of knee     Dr Alejandro Neal GERD (gastroesophageal reflux disease)     Hemiparesis, left ([address] 2013    [facility])    Hemod[facility])     History of heart failure     History of seizures     History of type C viral hepatitis     HTN (hypertension)     Hyperlipidemia     Impaired mobility and activities of daily living     Mediastinal lymphadenopathy     Brandie Davila    Metabolic syndrome     Moderate persistent asthma without complication 1187    Neurogenic urinary incontinence 2013    Neuropathy in diabetes ([address]     Obesity (BMI 30-39. 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 Cleve Carrasquillo      x1 Dr. Zeus Mckeon, Dr Mary Ellen Parkinson CATH LAB PROCEDURE  10/02/2019    HYSTERECTOMY, TOTAL ABDOMINAL      one ovary intact, Dr Vinh Cardoza, menorrhagia    VA TOTAL KNEE ARTHROPLASTY Left pen INJECT 35 UNITS SUBCUTANEOUSLY AT NIGHT  Patient taking differently: Indications: 44 units  8/25/20  Yes Alejandro Narayan MD   isosorbide mononitrate (IMDUR) 60 MG extended release tablet Take 1 tablet by mouth daily 7/27/20  Yes Natalie Jones MD   amLODIPine (NORVASC) 10 MG tablet TAKE 1 TABLET BY MOUTH DAILY 6/1/20  Yes Abdoul Kim MD   aspirin 81 MG tablet Take 1 tablet by mouth daily 5/4/20  Yes Soledad Broussard MD   SURE COMFORT PEN NEEDLES 30G X 8 MM MISC USE AS DIRECTED FIVE TIMES A DAY 4/1/20  Yes Soledad Broussard MD   FREESTYLE LITE strip TEST FOUR TIMES DAILY BEFORE MEALS AND NIGHTLY 9/28/20   MARCELO Pérez   albuterol sulfate HFA (VENTOLIN HFA) 108 (90 Base) MCG/ACT inhaler Inhale 2 puffs into the lungs every 6 hours as needed for Wheezing    Historical Provider, MD   fluticasone (FLOVENT HFA) 110 MCG/ACT inhaler Inhale 2 puffs into the lungs 2 times daily  Patient taking differently: Inhale 2 puffs into the lungs 2 times daily as needed  9/23/20 9/23/21  Soledad Broussard MD   [facility] To use with transport outside of the house.  9/8/20   Soledad Broussard MD   Magnesium Oxide -Mg Supplement 400 MG CAPS  8/1/20   Historical Provider, MD   ipratropium-albuterol (DUONEB) 0.5-2.5 (3) MG/3ML SOLN nebulizer solution Inhale 3 mLs into the lungs every 4 hours as needed for Shortness of Breath 7/2/20   Tawny Huffman MD   Blood Glucose Monitoring Suppl (FREESTYLE LITE) CORETTA 1 Device by Does not apply route daily as needed Use freestyle meter to test blood sugar as needed    Historical Provider, MD   atorvastatin (LIPITOR) 40 MG tablet Take 1 tablet by mouth daily 5/4/20   Soledad Broussard MD   melatonin 3 MG TABS tablet TAKE 1 TABLET BY MOUTH EVERY NIGHT AS NEEDED FOR INSOMNIA 3/17/20   Soledad Broussard MD   nitroGLYCERIN (NITROSTAT) 0.4 MG SL tablet Place 1 tablet under the tongue every 5 minutes as needed for Chest pain 9/22/15   Historical Provider, MD   magnesium oxide (MAG-OX) 400 MG tablet Take 400 mg by mouth daily    Historical Provider, MD       Allergies:  Codeine and Oxycontin [oxycodone hcl]    Social History:   TOBACCO:   reports that she is a non-smoker but has been exposed to tobacco smoke. She has never used smokeless tobacco.  ETOH:   reports no history of alcohol use. OCCUPATION: None    Family History:       Problem Relation Age of Onset    Cancer Mother 76        survived   Vidya Hannah Hypertension Father     Diabetes Sister     Mental Illness Sister        REVIEW OF SYSTEMS:  Ten systems reviewed and negative except for as above. Physical Exam:    Vitals: BP (!) 120/51   Pulse 76   Temp 97.7 °F (36.5 °C) (Oral)   Resp 16   Ht 5' 7\" (1.702 m)   Wt 217 lb (98.4 kg)   LMP  (LMP Unknown)   SpO2 94%   BMI 33.99 kg/m²   Constitutional: alert, appears stated age and cooperative  Skin: Skin color, texture, turgor normal. No rashes or lesions  Eyes:Eye: Normal external eye, conjunctiva, JUAN. ENT: Head: Normocephalic, no lesions, without obvious abnormality. Neck: no adenopathy, no carotid bruit, no JVD, supple, symmetrical, trachea midline and thyroid not enlarged, symmetric, no tenderness/mass/nodules  Respiratory: clear to auscultation bilaterally throughout most of the lung fields few basilar rales that do not clear with cough  Cardiovascular: regular rate and rhythm, systolic murmur 1 out of 6  Gastrointestinal: soft, non-tender; bowel sounds normal; no masses,  no organomegaly  Genitourinary: Deferred  Musculoskeletal:extremities normal, atraumatic, no cyanosis or edema right forearm graft  Neurologic: Mental status AAOx3 No facial asymmetry or droop. Normal muscle strength b/l. Psychiatric: Appropriate mood and affect.  Good insight and judgement  Hematologic: No obvious bruising or bleeding    Recent Labs     12/25/20  2315 12/25/20  2359   WBC 8.8  --    HGB 8.7* 8.2*     --      Recent Labs     12/25/20  2315 12/25/20  2359   *  --    K 3.4  --    CL 82*  --    CO2 28  --    BUN 41*  --    CREATININE 5.57* 5.0*   GLUCOSE 444*  --    AST 25  --    ALT 17  --    BILITOT 0.8*  --    ALKPHOS 144*  --      Troponin T:   Recent Labs     12/25/20 2315   TROPONINI 0.024*       Lactate: 5.41  ABGs:   Lab Results   Component Value Date    PHART 7.389 05/24/2020    PO2ART 98 05/24/2020    QYA8OZJ 32 05/24/2020     INR: No results for input(s): INR in the last 72 hours. URINALYSIS:  Recent Labs     12/25/20 2315   COLORU DARK YELLOW*   PHUR 5.0   WBCUA 0-2   RBCUA 0-2   BACTERIA Negative   CLARITYU Clear   SPECGRAV 1.020   LEUKOCYTESUR TRACE*   UROBILINOGEN 1.0   BILIRUBINUR MODERATE*   BLOODU Negative   GLUCOSEU 500*     -----------------------------------------------------------------   No results found. EKG: Normal sinus rhythm with first-degree AV block, right bundle branch block previous on old EKG 6/28    Assessment and Plan   1. Persistent hyperglycemia: Start high-dose sliding scale coverage with her home insulin coverage. Consult endocrinology. Discontinue any steroids as the patient has not hypoxic. The patient remains stable without any hypoxic and likely discharge without any steroids on her home insulin. Inflammation secondary to the Covid infection is likely driving her hyperglycemia. Trend inflammatory Covid markers. 2. History of coronary disease: Resume Brilinta. EKG is relatively unchanged. Patient is without any chest pain dyspnea can likely be attributed to the coronavirus infection. Troponins are unchanged  3. Paranoid schizophrenia: Seemingly well controlled at this time  4. ESRD on hemodialysis consult nephrology to resume hemodialysis tomorrow scheduled  5. Hypertension: Norvasc  6.  DVT prophylaxis with heparin per Covid protocol    Patient Active Problem List   Diagnosis Code    Coronary artery disease involving native heart with angina pectoris ([facility] I25.119   

## 2020-12-26 NOTE — ED NOTES
POC Glucose 482. Dr. Janie Santanaate aware and states patient can go to floor. Update to 5 [facility] nurse.      Nara Lopez RN  12/26/20 [id number]

## 2020-12-27 LAB
ALBUMIN SERPL-MCNC: 3 G/DL (ref 3.5–4.6)
ALP BLD-CCNC: 118 U/L (ref 40–130)
ALT SERPL-CCNC: 20 U/L (ref 0–33)
ANION GAP SERPL CALCULATED.3IONS-SCNC: 23 MEQ/L (ref 9–15)
ANISOCYTOSIS: ABNORMAL
APTT: 20.3 SEC (ref 24.4–36.8)
AST SERPL-CCNC: 36 U/L (ref 0–35)
BASOPHILS ABSOLUTE: 0 K/UL (ref 0–0.2)
BASOPHILS RELATIVE PERCENT: 0.1 %
BILIRUB SERPL-MCNC: 0.5 MG/DL (ref 0.2–0.7)
BUN BLDV-MCNC: 58 MG/DL (ref 8–23)
CALCIUM SERPL-MCNC: 8.3 MG/DL (ref 8.5–9.9)
CHLORIDE BLD-SCNC: 86 MEQ/L (ref 95–107)
CO2: 19 MEQ/L (ref 20–31)
CREAT SERPL-MCNC: 6.87 MG/DL (ref 0.5–0.9)
D DIMER: 3.65 MG/L FEU (ref 0–0.5)
EOSINOPHILS ABSOLUTE: 0 K/UL (ref 0–0.7)
EOSINOPHILS RELATIVE PERCENT: 0 %
FERRITIN: 1732 NG/ML (ref 13–150)
FIBRINOGEN: 400 MG/DL (ref 235–507)
GFR AFRICAN AMERICAN: 7.3
GFR NON-AFRICAN AMERICAN: 6.1
GLOBULIN: 3.9 G/DL (ref 2.3–3.5)
GLUCOSE BLD-MCNC: 194 MG/DL (ref 60–115)
GLUCOSE BLD-MCNC: 209 MG/DL (ref 60–115)
GLUCOSE BLD-MCNC: 236 MG/DL (ref 70–99)
GLUCOSE BLD-MCNC: 244 MG/DL (ref 60–115)
GLUCOSE BLD-MCNC: 257 MG/DL (ref 60–115)
GLUCOSE BLD-MCNC: 406 MG/DL (ref 60–115)
HCT VFR BLD CALC: 21.7 % (ref 37–47)
HEMOGLOBIN: 7.4 G/DL (ref 12–16)
INR BLD: 1.1
IRON SATURATION: 42 % (ref 11–46)
IRON: 82 UG/DL (ref 37–145)
LYMPHOCYTES ABSOLUTE: 1.4 K/UL (ref 1–4.8)
LYMPHOCYTES RELATIVE PERCENT: 11.2 %
MCH RBC QN AUTO: 31.8 PG (ref 27–31.3)
MCHC RBC AUTO-ENTMCNC: 34 % (ref 33–37)
MCV RBC AUTO: 93.5 FL (ref 82–100)
MONOCYTES ABSOLUTE: 0.7 K/UL (ref 0.2–0.8)
MONOCYTES RELATIVE PERCENT: 5.1 %
NEUTROPHILS ABSOLUTE: 10.7 K/UL (ref 1.4–6.5)
NEUTROPHILS RELATIVE PERCENT: 83.6 %
PDW BLD-RTO: 15.4 % (ref 11.5–14.5)
PERFORMED ON: ABNORMAL
PHOSPHORUS: 4.6 MG/DL (ref 2.3–4.8)
PLATELET # BLD: 215 K/UL (ref 130–400)
PLATELET SLIDE REVIEW: NORMAL
POTASSIUM REFLEX MAGNESIUM: 4.6 MEQ/L (ref 3.4–4.9)
PROTHROMBIN TIME: 14.5 SEC (ref 12.3–14.9)
RBC # BLD: 2.32 M/UL (ref 4.2–5.4)
SODIUM BLD-SCNC: 128 MEQ/L (ref 135–144)
TOTAL IRON BINDING CAPACITY: 194 UG/DL (ref 178–450)
TOTAL PROTEIN: 6.9 G/DL (ref 6.3–8)
WBC # BLD: 12.8 K/UL (ref 4.8–10.8)

## 2020-12-27 PROCEDURE — 6360000002 HC RX W HCPCS: Performed by: INTERNAL MEDICINE

## 2020-12-27 PROCEDURE — 6370000000 HC RX 637 (ALT 250 FOR IP): Performed by: NURSE PRACTITIONER

## 2020-12-27 PROCEDURE — 85025 COMPLETE CBC W/AUTO DIFF WBC: CPT

## 2020-12-27 PROCEDURE — 85384 FIBRINOGEN ACTIVITY: CPT

## 2020-12-27 PROCEDURE — 6370000000 HC RX 637 (ALT 250 FOR IP): Performed by: INTERNAL MEDICINE

## 2020-12-27 PROCEDURE — 85730 THROMBOPLASTIN TIME PARTIAL: CPT

## 2020-12-27 PROCEDURE — 80053 COMPREHEN METABOLIC PANEL: CPT

## 2020-12-27 PROCEDURE — 85610 PROTHROMBIN TIME: CPT

## 2020-12-27 PROCEDURE — 82728 ASSAY OF FERRITIN: CPT

## 2020-12-27 PROCEDURE — 36415 COLL VENOUS BLD VENIPUNCTURE: CPT

## 2020-12-27 PROCEDURE — 6360000002 HC RX W HCPCS: Performed by: NURSE PRACTITIONER

## 2020-12-27 PROCEDURE — 94761 N-INVAS EAR/PLS OXIMETRY MLT: CPT

## 2020-12-27 PROCEDURE — 83540 ASSAY OF IRON: CPT

## 2020-12-27 PROCEDURE — 83550 IRON BINDING TEST: CPT

## 2020-12-27 PROCEDURE — 85379 FIBRIN DEGRADATION QUANT: CPT

## 2020-12-27 PROCEDURE — 2060000000 HC ICU INTERMEDIATE R&B

## 2020-12-27 PROCEDURE — 84100 ASSAY OF PHOSPHORUS: CPT

## 2020-12-27 PROCEDURE — 94640 AIRWAY INHALATION TREATMENT: CPT

## 2020-12-27 RX ORDER — ACETAMINOPHEN 325 MG/1
650 TABLET ORAL EVERY 4 HOURS PRN
Status: DISCONTINUED | OUTPATIENT
Start: 2020-12-27 | End: 2020-12-28 | Stop reason: HOSPADM

## 2020-12-27 RX ORDER — INSULIN GLARGINE 100 [IU]/ML
30 INJECTION, SOLUTION SUBCUTANEOUS
Status: DISCONTINUED | OUTPATIENT
Start: 2020-12-27 | End: 2020-12-28 | Stop reason: HOSPADM

## 2020-12-27 RX ADMIN — ASPIRIN 81 MG CHEWABLE TABLET 81 MG: 81 TABLET CHEWABLE at 10:26

## 2020-12-27 RX ADMIN — ONDANSETRON 4 MG: 2 INJECTION INTRAMUSCULAR; INTRAVENOUS at 13:30

## 2020-12-27 RX ADMIN — Medication 400 MG: at 10:26

## 2020-12-27 RX ADMIN — ACETAMINOPHEN 650 MG: 325 TABLET, FILM COATED ORAL at 02:43

## 2020-12-27 RX ADMIN — INSULIN GLARGINE 30 UNITS: 100 INJECTION, SOLUTION SUBCUTANEOUS at 13:31

## 2020-12-27 RX ADMIN — TICAGRELOR 90 MG: 90 TABLET ORAL at 23:07

## 2020-12-27 RX ADMIN — ISOSORBIDE MONONITRATE 60 MG: 60 TABLET, EXTENDED RELEASE ORAL at 10:27

## 2020-12-27 RX ADMIN — PREGABALIN 75 MG: 50 CAPSULE ORAL at 10:26

## 2020-12-27 RX ADMIN — HEPARIN SODIUM 5000 UNITS: 5000 INJECTION INTRAVENOUS; SUBCUTANEOUS at 13:30

## 2020-12-27 RX ADMIN — GUAIFENESIN 600 MG: 600 TABLET, EXTENDED RELEASE ORAL at 23:04

## 2020-12-27 RX ADMIN — GUAIFENESIN 600 MG: 600 TABLET, EXTENDED RELEASE ORAL at 10:26

## 2020-12-27 RX ADMIN — FLUTICASONE PROPIONATE 2 PUFF: 110 AEROSOL, METERED RESPIRATORY (INHALATION) at 20:52

## 2020-12-27 RX ADMIN — FLUTICASONE PROPIONATE 2 PUFF: 110 AEROSOL, METERED RESPIRATORY (INHALATION) at 07:49

## 2020-12-27 RX ADMIN — HEPARIN SODIUM 5000 UNITS: 5000 INJECTION INTRAVENOUS; SUBCUTANEOUS at 22:00

## 2020-12-27 RX ADMIN — VITAM B12 100 MCG: 100 TAB at 10:26

## 2020-12-27 RX ADMIN — PANTOPRAZOLE SODIUM 20 MG: 20 TABLET, DELAYED RELEASE ORAL at 10:27

## 2020-12-27 RX ADMIN — TICAGRELOR 90 MG: 90 TABLET ORAL at 10:26

## 2020-12-27 RX ADMIN — RANOLAZINE 500 MG: 500 TABLET, FILM COATED, EXTENDED RELEASE ORAL at 10:26

## 2020-12-27 RX ADMIN — RANOLAZINE 500 MG: 500 TABLET, FILM COATED, EXTENDED RELEASE ORAL at 23:05

## 2020-12-27 RX ADMIN — HEPARIN SODIUM 5000 UNITS: 5000 INJECTION INTRAVENOUS; SUBCUTANEOUS at 06:10

## 2020-12-27 RX ADMIN — ARIPIPRAZOLE 5 MG: 5 TABLET ORAL at 10:26

## 2020-12-27 ASSESSMENT — PAIN SCALES - GENERAL
PAINLEVEL_OUTOF10: 0
PAINLEVEL_OUTOF10: 8
PAINLEVEL_OUTOF10: 0

## 2020-12-27 NOTE — CONSULTS
Nicki Goel [address]                      [facility], [address] [facility]                                  CONSULTATION    PATIENT NAME: Jonnie Howell                  :        1960  MED REC NO:   [id number]                            ROOM:       Y606  ACCOUNT NO:   [de-identified]                           ADMIT DATE: 2020  PROVIDER:     Micki Abreu MD    CONSULT DATE:  2020    ENDOCRINE CONSULT    REFERRING PROVIDER:  Valeri Steel DO    REASON FOR CONSULT:  Management of uncontrolled diabetes, hyperglycemia  after steroid use. CHIEF COMPLAINT AND HISTORY OF PRESENT ILLNESS:  The patient is  57-year-old female with known history of diabetes, end-stage renal  disease on dialysis. The patient is admitted initially or evaluated  initially for hyperglycemia. The patient has been sleeping and tired  for last 12-16 hours. She complained of severe weakness. Apparently,  also exposed to COVID to her granddaughter who had it 3 weeks ago. The  patient did test positive for COVID and did get a dose of Decadron which  was discontinued by nephrologist.  Blood sugars had gone as high as 500,  which has come down to 121 after insulin administration. Initial  glucose at the time of admission was 444. Chemistries from ; sodium was 127, potassium 3.4, chloride was 82,  CO2 was 28, BUN 41, creatinine was 4.47. The patient's insulin dose was  increased to 70 units of Lantus at night, Humalog 30 with each meals and  high dose Humalog coverage was started, which was changed Lantus to 40  units at bedtime, Humalog 12 units with each meals and low-dose Humalog  coverage. The patient also received regular 12 units IV dose x1. P.o. intake has been on the low side here. The patient's last hemoglobin A1c  was 7.7. Prior A1c have been between 6 to 8 range.     PAST MEDICAL HISTORY:  Significant for type 2 diabetes, schizophrenia, history of end-stage renal disease, coronary artery disease, congestive  heart failure, COPD. PAST SURGICAL HISTORY:  Hysterectomy, toe amputation, angioplasty with  stent, colonoscopy, C-sections, arthroplasty. FAMILY HISTORY:  Cancer, hypertension. PERSONAL AND SOCIAL HISTORY:  Passive smoker exposure. Denies any  substance abuse. MEDICATIONS:  Here included Lantus, Humalog as above, Norvasc, Abilify,  Lipitor, Decadron one time dose, Imdur, Protonix, Lyrica, Brilinta,  Ranexa, vitamin B12. ALLERGIES:  Include CODEINE, OXYCONTIN. REVIEW OF SYSTEMS:  Other than hyperglycemia, fatigue, increased  sleepiness, 14-point review of system was negative. Physical examination was as per hospitalist as it was a virtual visit. ASSESSMENT:  Uncontrolled diabetes, improving, status post use of  Decadron; COVID positive; end-stage renal disease on dialysis. PLAN:  Adjust Lantus to 40 units at night, low-dose Humalog coverage,  Humalog 8 units with each meals, hold if sugars less than 120. Continue  other supportive measures. Blood sugar goal here 140-200 range. Continue other supportive measures in the mean _____ control closely. Thank you for the consult.         Jean Keys MD    D: 12/26/2020 23:17:14       T: 12/26/2020 23:28:31     [email or web address]  Job#: [id number]     Doc#: [id number]    CC:

## 2020-12-27 NOTE — CARE COORDINATION
Spoke with patient and discussed discharge planning. She states she is in dialysis now and will call daughter Eulalio Urban to pick her up once she is discharged. She is agreeable to this CM calling her in the a.m. tomorrow to confirm set up for [facility]. Sara Barkley RN on plan.

## 2020-12-27 NOTE — PROGRESS NOTES
Nephrology Progress Note    Assessment:  56y.o. year old female with history s/f ESRd on IHD TTS, CAD s/p DEWEY, CHF, T2DM, GERD, asthma and schizophrenia who presented for persistent hyperglycemia and lethargy.     1. ESRD on IHD TTS, pt of Dr. Chris Worrell at [facility]   2. Covid infection   3. HTN  4. Persistent hyperglycemia: endocrinology consulted  5. Renal anemia  6. Hyponatremia  7.  Secondary renal hyperparathyroidism: on sensipar      Plan:  - will resume HD TThSun starting tomorrow  - iron studies pending    Patient Active Problem List:     Coronary artery disease involving native heart with angina pectoris (HCC)     Schizophrenia, paranoid, chronic     Metabolic syndrome     Vitamin B 12 deficiency     Cerebral microvascular disease     Mixed hyperlipidemia     Other hammer toe (acquired)     Vitamin D insufficiency     Incontinence of urine     Diabetic nephropathy with proteinuria (HCC)     Essential hypertension     History of type C viral hepatitis     Urinary incontinence due to cognitive impairment     History of seizures     Stented coronary artery-plan is to stay on Plavix indefinately per Dr Megha Ambrocio     Hemiparesis, left ([address]     Angina, class II ([address]     Chronic painful diabetic neuropathy ([address]     Tardive dyskinesia     Shortness of breath     Uncontrolled type 2 diabetes mellitus with hyperglycemia (HCC)     Diastolic congestive heart failure (HCC)     Sleep apnea     Pulmonary hypertension (HCC)     Class 2 severe obesity with serious comorbidity and body mass index (BMI) of 36.0 to 36.9 in adult [facility])     Edema     Closed supracondylar fracture of right humerus     Other chronic pain     Palliative care patient     Chest pain     Recurrent falls     Renal failure     Difficulty in walking     ESRD (end stage renal disease) on dialysis ([address]     Weakness     Moderate persistent asthma without complication     Thrush     COVID-19      Subjective:  Admit Date: 12/25/2020    Interval History: on RA, 101/44   Pulse 81   Temp 97.5 °F (36.4 °C) (Oral)   Resp 20   Ht 5' 7\" (1.702 m)   Wt 217 lb (98.4 kg)   LMP  (LMP Unknown)   SpO2 100%   BMI 33.99 kg/m²    Wt Readings from Last 3 Encounters:   12/26/20 217 lb (98.4 kg)   09/23/20 218 lb (98.9 kg)   08/03/20 219 lb (99.3 kg)      24HR INTAKE/OUTPUT:      Intake/Output Summary (Last 24 hours) at 12/27/2020 1217  Last data filed at 12/26/2020 1932  Gross per 24 hour   Intake 800 ml   Output --   Net 800 ml       General: alert, in no apparent distress  HEENT: normocephalic, atraumatic, anicteric  Neck: supple, no mass  Lungs: non-labored respirations, clear to auscultation bilaterally  Heart: regular rate and rhythm, no murmurs or rubs  Abdomen: soft, non-tender, non-distended  MSK: no joint swelling or tenderness  Ext: no cyanosis, no peripheral edema  Neuro: alert and oriented, no gross abnormalities  Psych: normal mood and affect      Electronically signed by Ольга Zhang MD

## 2020-12-27 NOTE — FLOWSHEET NOTE
12/27/20 1751   Vital Signs   BP (!) 133/51   Temp 97.8 °F (36.6 °C)   Pulse 81   Resp 18   Weight 210 lb 15.7 oz (95.7 kg)   Weight Method Actual;Bed scale   Percent Weight Change -3.14   Post-Hemodialysis Assessment   Post-Treatment Procedures Blood returned; Access bleeding time < 10 minutes   Machine Disinfection Process Acid/Vinegar Clean;Heat Disinfect; Machine Absence of Bleach Machine   Dialyzer Clearance Moderately streaked   Duration of Treatment (minutes) 3.5 minutes   Hemodialysis Intake (ml) 400 ml   Hemodialysis Output (ml) 3400 ml   NET Removed (ml) 3000 ml   Tolerated Treatment Good   Patient Response to Treatment Pt tolerated tx well. Needles pulled. Hemostasis achieved. Dressing applied.    Bilateral Breath Sounds Diminished   Edema Generalized

## 2020-12-27 NOTE — CARE COORDINATION
Phoned dialysis RN. Clarified that patient is needed to have dialysis first. She aware and will continue to follow.

## 2020-12-27 NOTE — DISCHARGE SUMMARY
Hospital Medicine Discharge Summary    Librado Monzon  :  1960  MRN:  [id number]    Admit date:  2020  Discharge date:  2020    Admitting Physician:  Vicky Urias DO  Primary Care Physician:  Saskia Cagle MD      Discharge Diagnoses:      Hyperglycemia- no DKA, h./o DM  ESRD on HD TTS  covid 19 infection- asymptomatic     Chief Complaint   Patient presents with    Hyperglycemia     high blood sugar today     Hospital Course:       Admitted with hyperglycemia. Insulin adjusted by Endo. ESRD, HD resumed by renal. No covid 19 symptoms. Steroids stopped on admission. Educated on covid 19 isolation. Exam on discharge:   BP (!) 101/44   Pulse 81   Temp 97.5 °F (36.4 °C) (Oral)   Resp 20   Ht 5' 7\" (1.702 m)   Wt 217 lb (98.4 kg)   LMP  (LMP Unknown)   SpO2 100%   BMI 33.99 kg/m²   General appearance: No apparent distress, appears stated age and cooperative. HEENT: Pupils equal, round, and reactive to light. Conjunctivae/corneas clear. Neck: Supple, with full range of motion. No jugular venous distention. Trachea midline. Respiratory:  Normal respiratory effort. Clear to auscultation, bilaterally without Rales/Wheezes/Rhonchi. Cardiovascular: Regular rate and rhythm with normal S1/S2 without murmurs, rubs or gallops. Abdomen: Soft, non-tender, non-distended with normal bowel sounds. Musculoskeletal: No clubbing, cyanosis or edema bilaterally. Full range of motion without deformity. Skin: Skin color, texture, turgor normal.  No rashes or lesions. Neuro: Non Focal. Symetrical motor and tone. Nl Comprehension, Alert,awake and oriented. NL CN. Symetrical tone and reflexes.   Psychiatric: Alert and oriented, thought content appropriate, normal insight  Capillary Refill: Brisk,< 3 seconds   Peripheral Pulses: +2 palpable, equal bilaterally     Patient was seen by the following consultants while admitted to [facility]:   Consults:  Arti Yoder TABLET BY MOUTH 2 TIMES DAILY             fluticasone (FLOVENT HFA) 110 MCG/ACT inhaler  Inhale 2 puffs into the lungs 2 times daily             FREESTYLE LITE strip  TEST FOUR TIMES DAILY BEFORE MEALS AND NIGHTLY             insulin aspart (NOVOLOG FLEXPEN) 100 UNIT/ML injection pen  INJECT 18 UNITS SUBCUTANEOUSLY BEFORE BREAKFAST AND DINNER AND 8 UNITS BEFORE LUNCH             ipratropium-albuterol (DUONEB) 0.5-2.5 (3) MG/3ML SOLN nebulizer solution  Inhale 3 mLs into the lungs every 4 hours as needed for Shortness of Breath             isosorbide mononitrate (IMDUR) 60 MG extended release tablet  Take 1 tablet by mouth daily             LANTUS SOLOSTAR 100 UNIT/ML injection pen  INJECT 35 UNITS SUBCUTANEOUSLY AT NIGHT             magnesium oxide (MAG-OX) 400 MG tablet  Take 400 mg by mouth daily             Magnesium Oxide -Mg Supplement 400 MG CAPS               melatonin 3 MG TABS tablet  TAKE 1 TABLET BY MOUTH EVERY NIGHT AS NEEDED FOR INSOMNIA             Misc. Devices [facility]) MISC  To use with transport outside of the house.              nitroGLYCERIN (NITROSTAT) 0.4 MG SL tablet  Place 1 tablet under the tongue every 5 minutes as needed for Chest pain             nystatin (NYAMYC) 919642 UNIT/GM powder  APPLY TO ABDOMINAL FOLDS EVERY 12 HOURS AS NEEDED             pantoprazole (PROTONIX) 20 MG tablet  TAKE 1 TABLET BY MOUTH DAILY             pregabalin (LYRICA) 75 MG capsule  TAKE ONE (1) CAPSULE BY MOUTH TWICE DAILY             ranolazine (RANEXA) 500 MG extended release tablet  Take 1 tablet by mouth 2 times daily             sertraline (ZOLOFT) 50 MG tablet  TAKE 1 TABLET BY MOUTH DAILY             SURE COMFORT PEN NEEDLES 30G X 8 MM MISC  USE AS DIRECTED FIVE TIMES A DAY             triamcinolone (KENALOG) 0.1 % cream  APPLY TO AFFECTED AREA TWICE DAILY AS DIRECTED             vitamin B-12 (CYANOCOBALAMIN) 100 MCG tablet  TAKE 1 TABLET BY MOUTH DAILY             insulin to be reconciled by Dr Lorena Moore Disposition:   If discharged to Home, Any [facility] needs that were indicated and/or required as been addressed and set up by Social Work. Condition at discharge: good     Activity: activity as tolerated    Total time taken for discharging this patient: 40 minutes. Greater than 70% of time was spent focused exclusively on this patient. Time was taken to review chart, discuss plans with consultants, reconciling medications, discussing plan answering questions with patient.      Sarthak Day  12/27/2020, 12:33 PM  ----------------------------------------------------------------------------------------------------------------------    Kenya Doshi

## 2020-12-27 NOTE — CARE COORDINATION
Phoned [address] [facility] office of [facility]. They gave number for [facility] #[phone] to verify when patient is on the schedule. Left voicemail for LSW at [facility]. Phoned again, spoke with an RN that gave me the manager's number. Phoned [phone] and spoke with Checo Watkins at [facility]. She states she is not aware of this patient and that [facility] needs to initiate transfer. She is agreeable to this CM calling in the a.m to set up for Tuesday. Will also call [facility]. Will continue to follow.

## 2020-12-27 NOTE — FLOWSHEET NOTE
Perfect-serve message sent to Dr Sarah Ramos to update on patients glucose of 244 for breakfast and 406 prior to lunch today to see if patient needs another day of glucose monitoring to make insulin adjustments or if he is okay with discharge today after dialysis, awaiting reply.

## 2020-12-28 ENCOUNTER — TELEPHONE (OUTPATIENT)
Dept: FAMILY MEDICINE CLINIC | Age: 60
End: 2020-12-28

## 2020-12-28 VITALS
HEIGHT: 67 IN | SYSTOLIC BLOOD PRESSURE: 90 MMHG | TEMPERATURE: 97.7 F | BODY MASS INDEX: 33.11 KG/M2 | OXYGEN SATURATION: 100 % | HEART RATE: 80 BPM | DIASTOLIC BLOOD PRESSURE: 70 MMHG | RESPIRATION RATE: 18 BRPM | WEIGHT: 210.98 LBS

## 2020-12-28 LAB
ALBUMIN SERPL-MCNC: 3.5 G/DL (ref 3.5–4.6)
ALP BLD-CCNC: 132 U/L (ref 40–130)
ALT SERPL-CCNC: 26 U/L (ref 0–33)
ANION GAP SERPL CALCULATED.3IONS-SCNC: 13 MEQ/L (ref 9–15)
APTT: 27.8 SEC (ref 24.4–36.8)
AST SERPL-CCNC: 31 U/L (ref 0–35)
BASOPHILS ABSOLUTE: 0 K/UL (ref 0–0.2)
BASOPHILS RELATIVE PERCENT: 0.3 %
BILIRUB SERPL-MCNC: 0.5 MG/DL (ref 0.2–0.7)
BUN BLDV-MCNC: 36 MG/DL (ref 8–23)
CALCIUM SERPL-MCNC: 8.2 MG/DL (ref 8.5–9.9)
CHLORIDE BLD-SCNC: 88 MEQ/L (ref 95–107)
CO2: 30 MEQ/L (ref 20–31)
CREAT SERPL-MCNC: 4.34 MG/DL (ref 0.5–0.9)
D DIMER: 5.78 MG/L FEU (ref 0–0.5)
EOSINOPHILS ABSOLUTE: 0.1 K/UL (ref 0–0.7)
EOSINOPHILS RELATIVE PERCENT: 0.8 %
FIBRINOGEN: 440 MG/DL (ref 235–507)
GFR AFRICAN AMERICAN: 12.5
GFR NON-AFRICAN AMERICAN: 10.3
GLOBULIN: 3.7 G/DL (ref 2.3–3.5)
GLUCOSE BLD-MCNC: 245 MG/DL (ref 60–115)
GLUCOSE BLD-MCNC: 304 MG/DL (ref 70–99)
GLUCOSE BLD-MCNC: 327 MG/DL (ref 60–115)
HCT VFR BLD CALC: 23.6 % (ref 37–47)
HEMOGLOBIN: 8.1 G/DL (ref 12–16)
INR BLD: 1.1
LYMPHOCYTES ABSOLUTE: 1.9 K/UL (ref 1–4.8)
LYMPHOCYTES RELATIVE PERCENT: 17.9 %
MCH RBC QN AUTO: 30.9 PG (ref 27–31.3)
MCHC RBC AUTO-ENTMCNC: 34.4 % (ref 33–37)
MCV RBC AUTO: 89.7 FL (ref 82–100)
MONOCYTES ABSOLUTE: 0.8 K/UL (ref 0.2–0.8)
MONOCYTES RELATIVE PERCENT: 7.3 %
NEUTROPHILS ABSOLUTE: 8 K/UL (ref 1.4–6.5)
NEUTROPHILS RELATIVE PERCENT: 73.7 %
PDW BLD-RTO: 15.5 % (ref 11.5–14.5)
PERFORMED ON: ABNORMAL
PERFORMED ON: ABNORMAL
PLATELET # BLD: 237 K/UL (ref 130–400)
POTASSIUM REFLEX MAGNESIUM: 3.8 MEQ/L (ref 3.4–4.9)
PROTHROMBIN TIME: 14.1 SEC (ref 12.3–14.9)
RBC # BLD: 2.63 M/UL (ref 4.2–5.4)
SODIUM BLD-SCNC: 131 MEQ/L (ref 135–144)
TOTAL PROTEIN: 7.2 G/DL (ref 6.3–8)
WBC # BLD: 10.8 K/UL (ref 4.8–10.8)

## 2020-12-28 PROCEDURE — 85730 THROMBOPLASTIN TIME PARTIAL: CPT

## 2020-12-28 PROCEDURE — 6370000000 HC RX 637 (ALT 250 FOR IP): Performed by: INTERNAL MEDICINE

## 2020-12-28 PROCEDURE — 93010 ELECTROCARDIOGRAM REPORT: CPT | Performed by: INTERNAL MEDICINE

## 2020-12-28 PROCEDURE — 85025 COMPLETE CBC W/AUTO DIFF WBC: CPT

## 2020-12-28 PROCEDURE — 94640 AIRWAY INHALATION TREATMENT: CPT

## 2020-12-28 PROCEDURE — 85379 FIBRIN DEGRADATION QUANT: CPT

## 2020-12-28 PROCEDURE — 99232 SBSQ HOSP IP/OBS MODERATE 35: CPT | Performed by: INTERNAL MEDICINE

## 2020-12-28 PROCEDURE — 80053 COMPREHEN METABOLIC PANEL: CPT

## 2020-12-28 PROCEDURE — 85610 PROTHROMBIN TIME: CPT

## 2020-12-28 PROCEDURE — 6360000002 HC RX W HCPCS: Performed by: INTERNAL MEDICINE

## 2020-12-28 PROCEDURE — 94760 N-INVAS EAR/PLS OXIMETRY 1: CPT

## 2020-12-28 PROCEDURE — 36415 COLL VENOUS BLD VENIPUNCTURE: CPT

## 2020-12-28 PROCEDURE — 85384 FIBRINOGEN ACTIVITY: CPT

## 2020-12-28 RX ORDER — INSULIN ASPART 100 [IU]/ML
INJECTION, SOLUTION INTRAVENOUS; SUBCUTANEOUS
Qty: 15 ML | Refills: 10 | Status: ON HOLD | OUTPATIENT
Start: 2020-12-28 | End: 2021-01-14 | Stop reason: SDUPTHER

## 2020-12-28 RX ADMIN — PANTOPRAZOLE SODIUM 20 MG: 20 TABLET, DELAYED RELEASE ORAL at 10:32

## 2020-12-28 RX ADMIN — Medication 400 MG: at 10:32

## 2020-12-28 RX ADMIN — HEPARIN SODIUM 5000 UNITS: 5000 INJECTION INTRAVENOUS; SUBCUTANEOUS at 06:50

## 2020-12-28 RX ADMIN — RANOLAZINE 500 MG: 500 TABLET, FILM COATED, EXTENDED RELEASE ORAL at 10:33

## 2020-12-28 RX ADMIN — ASPIRIN 81 MG CHEWABLE TABLET 81 MG: 81 TABLET CHEWABLE at 10:30

## 2020-12-28 RX ADMIN — GUAIFENESIN 600 MG: 600 TABLET, EXTENDED RELEASE ORAL at 10:30

## 2020-12-28 RX ADMIN — INSULIN GLARGINE 30 UNITS: 100 INJECTION, SOLUTION SUBCUTANEOUS at 11:02

## 2020-12-28 RX ADMIN — PREGABALIN 75 MG: 50 CAPSULE ORAL at 10:32

## 2020-12-28 RX ADMIN — VITAM B12 100 MCG: 100 TAB at 10:33

## 2020-12-28 RX ADMIN — ARIPIPRAZOLE 5 MG: 5 TABLET ORAL at 10:30

## 2020-12-28 RX ADMIN — FLUTICASONE PROPIONATE 2 PUFF: 110 AEROSOL, METERED RESPIRATORY (INHALATION) at 07:28

## 2020-12-28 RX ADMIN — TICAGRELOR 90 MG: 90 TABLET ORAL at 10:33

## 2020-12-28 ASSESSMENT — PAIN SCALES - GENERAL
PAINLEVEL_OUTOF10: 0

## 2020-12-28 NOTE — CARE COORDINATION
Phoned Angela Keane at [facility] in [place]. She to initiate transfer to [facility] so patient can be scheduled for a chair time since she is COVID +. Will follow up with [facility] KEN GONZALEZ shortly to verify chair time, so that [facility] tranport can be set up.

## 2020-12-28 NOTE — CARE COORDINATION
Msg sent from Dr Carla Disla. Okay to discharge. Notified [facility], RN. Phoned [facility] at [facility]. She is confirmed set up for tomorrow at 0815. Notified patient and FLAQUITO Beaver.

## 2020-12-28 NOTE — FLOWSHEET NOTE
0845- AM assessment complete. Will hold BP meds for /49. Will monitor. [address] [facility] [address] Dominique Solis re: BP 90/70, pt asymptomatic, no new orders    1445- Pt and daughter Glenn Walker updated on discharge. Message sent to Dr Fatimah Gillette to reconcile diabetic meds    [phone]- Spoke with daughter Glenn Walker on phone and went thru DC instructions, verbalized understanding. She is on her way to  pt    1551- Pt verbalized DC instructions.  Transport here to pick pt up via   Electronically signed by Edgar Goldstein RN on 12/28/2020 at 3:52 PM

## 2020-12-28 NOTE — CARE COORDINATION
Phoned daughter Jay Turcios to update her of discharge planning. She aware of [facility] transport and dialysis schedule. She requests Anisha Scruggs RN to call her to discuss updated meds and changes for d/c. Msg sent to physician for Carlyn.

## 2020-12-28 NOTE — TELEPHONE ENCOUNTER
QUINN  Pt's Care manager from [address] [place] [address] called just to let you know that Pt was admitted into the hospital on Bodega Day for elevated blood sugars. Pt was also tested for Covid and was positive.  Pt is still in the hospital and may get released today if her blood sugars are stable.    :)

## 2020-12-29 ENCOUNTER — CARE COORDINATION (OUTPATIENT)
Dept: CASE MANAGEMENT | Age: 60
End: 2020-12-29

## 2020-12-29 ENCOUNTER — TELEPHONE (OUTPATIENT)
Dept: FAMILY MEDICINE CLINIC | Age: 60
End: 2020-12-29

## 2020-12-29 RX ORDER — BLOOD-GLUCOSE METER
1 KIT MISCELLANEOUS DAILY PRN
Qty: 1 DEVICE | Refills: 0 | Status: SHIPPED | OUTPATIENT
Start: 2020-12-29

## 2020-12-29 ASSESSMENT — ENCOUNTER SYMPTOMS: SHORTNESS OF BREATH: 1

## 2020-12-29 NOTE — PROGRESS NOTES
Progress Note  Date:2020       [id number]  Patient Mary Kay Jimenez     YOB: 1960     Age:60 y.o. Chief complaint uncontrolled diabetes    Subjective    Subjective:  Symptoms:  Improved. She reports shortness of breath. Diet:  Poor intake. Activity level: Returning to normal.       Review of Systems   Constitutional: Positive for fatigue. Respiratory: Positive for shortness of breath. Objective         Vitals Last 24 Hours:  TEMPERATURE:  Temp  Av.7 °F (36.5 °C)  Min: 97.7 °F (36.5 °C)  Max: 97.7 °F (36.5 °C)  RESPIRATIONS RANGE: Resp  Av  Min: 18  Max: 18  PULSE OXIMETRY RANGE: SpO2  Av %  Min: 100 %  Max: 100 %  PULSE RANGE: Pulse  Av.5  Min: 73  Max: 80  BLOOD PRESSURE RANGE: Systolic (78SJA), DOF:62 , Min:90 , VEB:162   ; Diastolic (41VRE), DXS:99, Min:49, Max:70    I/O (24Hr): Intake/Output Summary (Last 24 hours) at 2020 0755  Last data filed at 2020 1315  Gross per 24 hour   Intake 480 ml   Output --   Net 480 ml     Objective:  Vital signs: (most recent): Blood pressure 90/70, pulse 80, temperature 97.7 °F (36.5 °C), temperature source Oral, resp. rate 18, height 5' 7\" (1.702 m), weight 210 lb 15.7 oz (95.7 kg), SpO2 100 %, not currently breastfeeding.   Vital signs are normal.      Labs/Imaging/Diagnostics    Labs:  CBC:  Recent Labs     20  0709 20  0651   WBC 12.8* 10.8   RBC 2.32* 2.63*   HGB 7.4* 8.1*   HCT 21.7* 23.6*   MCV 93.5 89.7   RDW 15.4* 15.5*    237     CHEMISTRIES:  Recent Labs     20  0709 20  0651   * 131*   K 4.6 3.8   CL 86* 88*   CO2 19* 30   BUN 58* 36*   CREATININE 6.87* 4.34*   GLUCOSE 236* 304*   PHOS 4.6  --      PT/INR:  Recent Labs     20  0709 20  0651   PROTIME 14.5 14.1   INR 1.1 1.1     APTT:  Recent Labs     20  0709 20  0651   APTT 20.3* 27.8     LIVER PROFILE:  Recent Labs     20  0709 20  0651   AST 36* 31   ALT 20 26 BILITOT 0.5 0.5   ALKPHOS 118 132*       Imaging Last 24 Hours:  No results found. Assessment//Plan           Hospital Problems           Last Modified POA    COVID-19 12/26/2020 Yes        Assessment:    Condition: In stable condition. Improving. (Uncontrolled type 2 diabetes  COVID-19 pneumonia). Plan:   Discharge home. (Okay to discharge patient on Lantus 35units at night plus NovoLog 12 units with each meals patient to follow-up in the office in 2 weeks time  Reviewed nurses notes meds were reconciled).        Electronically signed by Becca Rush MD on 12/29/20 at 7:55 AM EST

## 2020-12-29 NOTE — TELEPHONE ENCOUNTER
Patient requesting medication refill.  Please approve or deny this request.    Rx requested:  Requested Prescriptions     Pending Prescriptions Disp Refills    Blood Glucose Monitoring Suppl (FREESTYLE LITE) CORETTA 1 Device      Si Device by Does not apply route daily as needed Use freestyle meter to test blood sugar as needed         Last Office Visit:   2020      Next Visit Date:  Future Appointments   Date Time Provider Aminata Cortes   2021  9:30 AM Elizabeth Kaye MD [facility]   2021 12:45 PM Bette Sandifer, MD [facility]

## 2020-12-29 NOTE — CARE COORDINATION
[facility] Transitions Initial Follow Up Call    Call within 2 business days of discharge: Yes    Patient: Jason Randolph Patient : 1960   MRN: [id number]  Reason for Admission: -2020 [address] CV-19, Hyperglycemia  Discharge Date: 20 RARS: Readmission Risk Score: 38  NR   CV-19 pos. ESRD on HD TTS  CT/CV-19 Transitions    Care Transitions request call back to P: [phone] for initial outreach.     Care Transitions 24 Hour Call    Post Acute Services:  (Comment: Ledy Baca [address])  Patient DME: Brittani Dietz chair  Do you have support at home?: Child, Marin De Anda  Are you an active caregiver in your home?: No  Care Transitions Interventions         Follow Up  Future Appointments   Date Time Provider Aminata Cortes   2021  9:30 AM Nathaly Workman MD [facility]   2021 12:45 PM Jay Amador MD [place] [address], [address] [facility]

## 2020-12-29 NOTE — TELEPHONE ENCOUNTER
Mikhail Mckenna from [facility] health care called & said PT was released from hospital yesterday and she would need an order to continue her home health care.

## 2020-12-30 ENCOUNTER — CARE COORDINATION (OUTPATIENT)
Dept: CASE MANAGEMENT | Age: 60
End: 2020-12-30

## 2020-12-30 PROCEDURE — 1111F DSCHRG MED/CURRENT MED MERGE: CPT | Performed by: FAMILY MEDICINE

## 2020-12-30 NOTE — CARE COORDINATION
[facility] Transitions Initial Follow Up Call    Call within 2 business days of discharge: Yes    Patient: Pernell Begum Patient : 1960   MRN: [id number]  Reason for Admission: -2020 [facility] CV-19, Hyperglycemia  Discharge Date: 20 RARS: Readmission Risk Score: 38  NR   CV-19 pos. ESRD on HD TTS  PCP  9:30  Med rec/1111F order completed  CT/CV-19 Transitions      Challenges to be reviewed by the provider   Additional needs identified to be addressed with provider No  none    Discussed COVID-19 related testing which was available at this time. Test results were positive. Patient informed of results, if available? Yes         Method of communication with provider : none    Advance Care Planning:   Does patient have an Advance Directive:  reviewed and current. Was this a readmission? No  Patient stated reason for admission: CV-19, High sugars  Patients top risk factors for readmission: functional cognitive ability and medical condition    Care Transition Nurse (CTN) contacted the patient by telephone to perform post hospital discharge assessment. Verified name and  with patient as identifiers. Provided introduction to self, and explanation of the CTN role. CTN reviewed discharge instructions, medical action plan and red flags with patient who verbalized understanding. Patient given an opportunity to ask questions and does not have any further questions or concerns at this time. Were discharge instructions available to patient? Yes. Reviewed appropriate site of care based on symptoms and resources available to patient including: Reviewed insulins. Reviewed to report trending high or low BS, v/u. Reviewed to report symptom changes with CV-19. . The patient agrees to contact the PCP office for questions related to their healthcare. Medication reconciliation was performed with patient, who verbalizes understanding of administration of home medications.  Advised obtaining a 90-day supply of all daily and as-needed medications. Covid Risk Education    Patient has following risk factors of: diabetes. Education provided regarding infection prevention, and signs and symptoms of COVID-19 and when to seek medical attention with patient who verbalized understanding. Discussed exposure protocols and quarantine From CDC: Are you at higher risk for severe illness?   and given an opportunity for questions and concerns. The patient agrees to contact the COVID-19 hotline [phone] or PCP office for questions related to COVID-19. For more information on steps you can take to protect yourself, see CDC's How to Protect Yourself     Patient/family/caregiver given information for [employer] and agrees to enroll no  Patient's preferred e-mail: declines  Patient's preferred phone number: declines    Discussed follow-up appointments. If no appointment was previously scheduled, appointment scheduling offered: 1/4 9:30. Is follow up appointment scheduled within 7 days of discharge? Yes  Non-[facility] follow up appointment(s):     Plan for follow-up call in 5-7 days based on severity of symptoms and risk factors. Plan for next call: symptom management-CV-19  CTN provided contact information for future needs. CTN spoke w/ pt. Reports generalized fatigue. Denies any cough, SOB, fever, chills, or flu-like symptoms. Pt denies any edema concerns. Reports today's fasting BS was 263. Reviewed insulins and to report trending BS highs or lows, v/u. States she does not need HHC right now. Has used [facility] in the past. Denies any home or med needs.      Care Transitions 24 Hour Call    Do you have any ongoing symptoms?: Yes  Patient-reported symptoms: Fatigue  Do you have a copy of your discharge instructions?: Yes  Do you have all of your prescriptions and are they filled?: Yes  Have you been contacted by a [address]?: No  Have you scheduled your follow up appointment?: Yes  Were you discharged with any Home Care or Post Acute Services: No  Post Acute Services:  (Comment: Ingrid Baca [address])  Patient DME: Rebeca Nissen chair  Do you have support at home?: Child, Grandchild  Do you feel like you have everything you need to keep you well at home?: Yes  Are you an active caregiver in your home?: No  Care Transitions Interventions         Follow Up  Future Appointments   Date Time Provider Aminata Cortes   1/4/2021  9:30 AM Elena Pierre MD [facility]   1/27/2021 12:45 PM Nancy Lizama MD [address]

## 2021-01-03 ENCOUNTER — HOSPITAL ENCOUNTER (EMERGENCY)
Age: 61
Discharge: HOME | End: 2021-01-03
Payer: MEDICARE

## 2021-01-03 ENCOUNTER — TELEPHONE (OUTPATIENT)
Dept: OTHER | Facility: CLINIC | Age: 61
End: 2021-01-03

## 2021-01-03 ENCOUNTER — APPOINTMENT (OUTPATIENT)
Dept: CT IMAGING | Age: 61
End: 2021-01-03
Payer: MEDICARE

## 2021-01-03 VITALS
SYSTOLIC BLOOD PRESSURE: 106 MMHG | RESPIRATION RATE: 16 BRPM | WEIGHT: 207 LBS | TEMPERATURE: 98.1 F | HEART RATE: 77 BPM | OXYGEN SATURATION: 98 % | DIASTOLIC BLOOD PRESSURE: 57 MMHG | HEIGHT: 67 IN | BODY MASS INDEX: 32.49 KG/M2

## 2021-01-03 DIAGNOSIS — R42 DIZZINESS: Primary | ICD-10-CM

## 2021-01-03 DIAGNOSIS — E87.6 HYPOKALEMIA: ICD-10-CM

## 2021-01-03 DIAGNOSIS — E86.0 DEHYDRATION: ICD-10-CM

## 2021-01-03 LAB
ALBUMIN SERPL-MCNC: 4 G/DL (ref 3.5–4.6)
ALP BLD-CCNC: 118 U/L (ref 40–130)
ALT SERPL-CCNC: 12 U/L (ref 0–33)
ANION GAP SERPL CALCULATED.3IONS-SCNC: 12 MEQ/L (ref 9–15)
AST SERPL-CCNC: 15 U/L (ref 0–35)
BASOPHILS ABSOLUTE: 0 K/UL (ref 0–0.2)
BASOPHILS RELATIVE PERCENT: 0.4 %
BILIRUB SERPL-MCNC: 1 MG/DL (ref 0.2–0.7)
BUN BLDV-MCNC: 9 MG/DL (ref 8–23)
CALCIUM SERPL-MCNC: 8.9 MG/DL (ref 8.5–9.9)
CHLORIDE BLD-SCNC: 89 MEQ/L (ref 95–107)
CO2: 31 MEQ/L (ref 20–31)
CREAT SERPL-MCNC: 2.49 MG/DL (ref 0.5–0.9)
EOSINOPHILS ABSOLUTE: 0.3 K/UL (ref 0–0.7)
EOSINOPHILS RELATIVE PERCENT: 2.7 %
GFR AFRICAN AMERICAN: 23.6
GFR NON-AFRICAN AMERICAN: 19.5
GLOBULIN: 2.9 G/DL (ref 2.3–3.5)
GLUCOSE BLD-MCNC: 280 MG/DL (ref 70–99)
HCT VFR BLD CALC: 26.7 % (ref 37–47)
HEMOGLOBIN: 9.1 G/DL (ref 12–16)
LYMPHOCYTES ABSOLUTE: 1.3 K/UL (ref 1–4.8)
LYMPHOCYTES RELATIVE PERCENT: 12 %
MAGNESIUM: 2.1 MG/DL (ref 1.7–2.4)
MCH RBC QN AUTO: 31 PG (ref 27–31.3)
MCHC RBC AUTO-ENTMCNC: 34.1 % (ref 33–37)
MCV RBC AUTO: 90.7 FL (ref 82–100)
MONOCYTES ABSOLUTE: 1 K/UL (ref 0.2–0.8)
MONOCYTES RELATIVE PERCENT: 8.9 %
NEUTROPHILS ABSOLUTE: 8.2 K/UL (ref 1.4–6.5)
NEUTROPHILS RELATIVE PERCENT: 76 %
PDW BLD-RTO: 16.2 % (ref 11.5–14.5)
PHOSPHORUS: 2.1 MG/DL (ref 2.3–4.8)
PLATELET # BLD: 211 K/UL (ref 130–400)
POTASSIUM SERPL-SCNC: 3.1 MEQ/L (ref 3.4–4.9)
RBC # BLD: 2.95 M/UL (ref 4.2–5.4)
SARS-COV-2, NAAT: DETECTED
SODIUM BLD-SCNC: 132 MEQ/L (ref 135–144)
TOTAL PROTEIN: 6.9 G/DL (ref 6.3–8)
WBC # BLD: 10.7 K/UL (ref 4.8–10.8)

## 2021-01-03 PROCEDURE — 2580000003 HC RX 258: Performed by: PHYSICIAN ASSISTANT

## 2021-01-03 PROCEDURE — 6370000000 HC RX 637 (ALT 250 FOR IP): Performed by: PHYSICIAN ASSISTANT

## 2021-01-03 PROCEDURE — 99285 EMERGENCY DEPT VISIT HI MDM: CPT

## 2021-01-03 PROCEDURE — 85025 COMPLETE CBC W/AUTO DIFF WBC: CPT

## 2021-01-03 PROCEDURE — 96360 HYDRATION IV INFUSION INIT: CPT

## 2021-01-03 PROCEDURE — U0002 COVID-19 LAB TEST NON-CDC: HCPCS

## 2021-01-03 PROCEDURE — 36415 COLL VENOUS BLD VENIPUNCTURE: CPT

## 2021-01-03 PROCEDURE — 80053 COMPREHEN METABOLIC PANEL: CPT

## 2021-01-03 PROCEDURE — 70450 CT HEAD/BRAIN W/O DYE: CPT

## 2021-01-03 PROCEDURE — 84100 ASSAY OF PHOSPHORUS: CPT

## 2021-01-03 PROCEDURE — 83735 ASSAY OF MAGNESIUM: CPT

## 2021-01-03 RX ORDER — 0.9 % SODIUM CHLORIDE 0.9 %
250 INTRAVENOUS SOLUTION INTRAVENOUS ONCE
Status: COMPLETED | OUTPATIENT
Start: 2021-01-03 | End: 2021-01-03

## 2021-01-03 RX ORDER — MECLIZINE HYDROCHLORIDE 25 MG/1
25 TABLET ORAL ONCE
Status: COMPLETED | OUTPATIENT
Start: 2021-01-03 | End: 2021-01-03

## 2021-01-03 RX ORDER — MECLIZINE HYDROCHLORIDE 25 MG/1
25 TABLET ORAL 3 TIMES DAILY PRN
Qty: 15 TABLET | Refills: 0 | Status: ON HOLD | OUTPATIENT
Start: 2021-01-03 | End: 2021-01-14 | Stop reason: HOSPADM

## 2021-01-03 RX ORDER — POTASSIUM CHLORIDE 750 MG/1
10 TABLET, FILM COATED, EXTENDED RELEASE ORAL ONCE
Status: COMPLETED | OUTPATIENT
Start: 2021-01-03 | End: 2021-01-03

## 2021-01-03 RX ADMIN — SODIUM CHLORIDE 250 ML: 9 INJECTION, SOLUTION INTRAVENOUS at 20:51

## 2021-01-03 RX ADMIN — POTASSIUM CHLORIDE 10 MEQ: 750 TABLET, FILM COATED, EXTENDED RELEASE ORAL at 21:24

## 2021-01-03 RX ADMIN — MECLIZINE HYDROCHLORIDE 25 MG: 25 TABLET ORAL at 19:20

## 2021-01-03 ASSESSMENT — ENCOUNTER SYMPTOMS
ABDOMINAL PAIN: 0
TROUBLE SWALLOWING: 0
SHORTNESS OF BREATH: 0
ALLERGIC/IMMUNOLOGIC NEGATIVE: 1
COLOR CHANGE: 0
EYE PAIN: 0
APNEA: 0

## 2021-01-03 NOTE — ED NOTES
Bed: 19  Expected date:   Expected time:   Means of arrival:   Comments:  [address]  210 BS 79 HR 13/70 97%RA     Samanta Smart  01/03/21 8604

## 2021-01-04 ENCOUNTER — CARE COORDINATION (OUTPATIENT)
Dept: CASE MANAGEMENT | Age: 61
End: 2021-01-04

## 2021-01-04 ENCOUNTER — VIRTUAL VISIT (OUTPATIENT)
Dept: FAMILY MEDICINE CLINIC | Age: 61
End: 2021-01-04
Payer: MEDICARE

## 2021-01-04 ENCOUNTER — TELEPHONE (OUTPATIENT)
Dept: FAMILY MEDICINE CLINIC | Age: 61
End: 2021-01-04

## 2021-01-04 DIAGNOSIS — E11.21 TYPE 2 DIABETES MELLITUS WITH DIABETIC NEPHROPATHY, UNSPECIFIED WHETHER LONG TERM INSULIN USE (HCC): ICD-10-CM

## 2021-01-04 DIAGNOSIS — U07.1 COVID-19: Primary | ICD-10-CM

## 2021-01-04 PROCEDURE — 1111F DSCHRG MED/CURRENT MED MERGE: CPT | Performed by: FAMILY MEDICINE

## 2021-01-04 PROCEDURE — 99215 OFFICE O/P EST HI 40 MIN: CPT | Performed by: FAMILY MEDICINE

## 2021-01-04 ASSESSMENT — PATIENT HEALTH QUESTIONNAIRE - PHQ9
1. LITTLE INTEREST OR PLEASURE IN DOING THINGS: 0
SUM OF ALL RESPONSES TO PHQ QUESTIONS 1-9: 0
2. FEELING DOWN, DEPRESSED OR HOPELESS: 0
SUM OF ALL RESPONSES TO PHQ QUESTIONS 1-9: 0
SUM OF ALL RESPONSES TO PHQ9 QUESTIONS 1 & 2: 0

## 2021-01-04 NOTE — PROGRESS NOTES
Guido Muñiz is a 56 y.o. female evaluated via telephone on 1/4/2021. Consent:  She and/or health care decision maker is aware that that she may receive a bill for this telephone service, depending on her insurance coverage, and has provided verbal consent to proceed: Yes      Documentation:  I communicated with the patient and/or health care decision maker about see below. Details of this discussion including any medical advice provided: see below      I affirm this is a Patient Initiated Episode with an Established Patient who has not had a related appointment within my department in the past 7 days or scheduled within the next 24 hours. Total Time:     Note: not billable if this call serves to triage the patient into an appointment for the relevant concern      Gloria VALDEZ     1/4/2021  Guido Muñiz is a 56 y.o. female being evaluated by a Virtual Visit (telephonic visit) encounter to address concerns as mentioned above. A caregiver was present when appropriate. Due to this being a TeleHealth encounter (During [facility] emergency), evaluation of the following organ systems was limited: Vitals/Constitutional/EENT/Resp/CV/GI//MS/Neuro/Skin/Heme-Lymph-Imm. Pursuant to the emergency declaration under the [address] [place] [address] authority and the [employer] Act, this Virtual Visit was conducted with patient's (and/or legal guardian's) consent, to reduce the patient's risk of exposure to COVID-19 and provide necessary medical care. The patient (and/or legal guardian) has also been advised to contact this office for worsening conditions or problems, and seek emergency medical treatment and/or call 911 if deemed necessary. Services were provided through a telephonic synchronous discussion virtually to substitute for in-person clinic visit. Patient and provider were located at their individual homes. --Tila Suárez MD on 1/4/2021 at 9:47 AM    An electronic signature was used to authenticate this note. Post-Discharge Transitional Care Management Services or Hospital Follow Up      Olga Lawson   YOB: 1960    Date of Office Visit:  1/4/2021  Date of Hospital Admission: 1/3/21  Date of Hospital Discharge: 1/3/21  Readmission Risk Score(high >=14%.  Medium >=10%):Readmission Risk Score: 38      Care management risk score Rising risk (score 2-5) and Complex Care (Scores >=6): 12     Non face to face  following discharge, date last encounter closed (first attempt may have been earlier): 12/30/2020  3:57 PM 12/30/2020  3:57 PM    Call initiated 2 business days of discharge: Yes     Patient Active Problem List   Diagnosis    Coronary artery disease involving native heart with angina pectoris ([address]    Schizophrenia, paranoid, chronic    Metabolic syndrome    Vitamin B 12 deficiency    Cerebral microvascular disease    Mixed hyperlipidemia    Other hammer toe (acquired)    Vitamin D insufficiency    Incontinence of urine    Diabetic nephropathy with proteinuria ([address]    Essential hypertension    History of type C viral hepatitis    Urinary incontinence due to cognitive impairment    History of seizures    Stented coronary artery-plan is to stay on Plavix indefinately per Dr Naseem Paul    Hemiparesis, [facility])    Angina, class II ([address]    Chronic painful diabetic neuropathy ([address]    Tardive dyskinesia    Shortness of breath    Uncontrolled type 2 diabetes mellitus with hyperglycemia ([address]    Diastolic congestive heart failure ([address]    Sleep apnea    Pulmonary hypertension ([address]  Class 2 severe obesity with serious comorbidity and body mass index (BMI) of 36.0 to 36.9 in adult [facility])    Edema    Closed supracondylar fracture of right humerus    Other chronic pain    Palliative care patient    Chest pain    Recurrent falls    Renal failure    Difficulty in walking    ESRD (end stage renal disease) on dialysis (HCC)    Weakness    Moderate persistent asthma without complication    Thrush    COVID-19       Allergies   Allergen Reactions    Codeine Hives     hives    Oxycontin [Oxycodone Hcl] Hives       Medications listed as ordered at the time of discharge from [facility]   [place], [id number] [facility] Medication Instructions ADRI:    Printed on:01/04/21 5070   Medication Information                      albuterol sulfate HFA (VENTOLIN HFA) 108 (90 Base) MCG/ACT inhaler  Inhale 2 puffs into the lungs every 6 hours as needed for Wheezing             amLODIPine (NORVASC) 10 MG tablet  TAKE 1 TABLET BY MOUTH DAILY             ARIPiprazole (ABILIFY) 5 MG tablet  TAKE 1 TABLET BY MOUTH DAILY             aspirin 81 MG tablet  Take 1 tablet by mouth daily             atorvastatin (LIPITOR) 40 MG tablet  Take 1 tablet by mouth daily             B Complex-C-Folic Acid (NEPHRO VITAMINS) 0.8 MG TABS  Take by mouth daily              Blood Glucose Monitoring Suppl (FREESTYLE LITE) CORETTA  1 Device by Does not apply route daily as needed (Diabetes) Use freestyle meter to test blood sugar as needed             BRILINTA 90 MG TABS tablet  TAKE 1 TABLET BY MOUTH 2 TIMES DAILY             fluticasone (FLOVENT HFA) 110 MCG/ACT inhaler  Inhale 2 puffs into the lungs 2 times daily             FREESTYLE LITE strip  TEST FOUR TIMES DAILY BEFORE MEALS AND NIGHTLY             insulin aspart (NOVOLOG FLEXPEN) 100 UNIT/ML injection pen  INJECT 12 units with each meals             ipratropium-albuterol (DUONEB) 0.5-2.5 (3) MG/3ML SOLN nebulizer solution Inhale 3 mLs into the lungs every 4 hours as needed for Shortness of Breath             isosorbide mononitrate (IMDUR) 60 MG extended release tablet  Take 1 tablet by mouth daily             LANTUS SOLOSTAR 100 UNIT/ML injection pen  INJECT 35 UNITS SUBCUTANEOUSLY AT NIGHT             magnesium oxide (MAG-OX) 400 MG tablet  Take 400 mg by mouth daily             Magnesium Oxide -Mg Supplement 400 MG CAPS               meclizine (ANTIVERT) 25 MG tablet  Take 1 tablet by mouth 3 times daily as needed for Dizziness             melatonin 3 MG TABS tablet  TAKE 1 TABLET BY MOUTH EVERY NIGHT AS NEEDED FOR INSOMNIA             Misc. Devices [facility]) MISC  To use with transport outside of the house.              nitroGLYCERIN (NITROSTAT) 0.4 MG SL tablet  Place 1 tablet under the tongue every 5 minutes as needed for Chest pain             nystatin (NYAMYC) 635357 UNIT/GM powder  APPLY TO ABDOMINAL FOLDS EVERY 12 HOURS AS NEEDED             pantoprazole (PROTONIX) 20 MG tablet  TAKE 1 TABLET BY MOUTH DAILY             pregabalin (LYRICA) 75 MG capsule  TAKE ONE (1) CAPSULE BY MOUTH TWICE DAILY             ranolazine (RANEXA) 500 MG extended release tablet  Take 1 tablet by mouth 2 times daily             sertraline (ZOLOFT) 50 MG tablet  TAKE 1 TABLET BY MOUTH DAILY             SURE COMFORT PEN NEEDLES 30G X 8 MM MISC  USE AS DIRECTED FIVE TIMES A DAY             triamcinolone (KENALOG) 0.1 % cream  APPLY TO AFFECTED AREA TWICE DAILY AS DIRECTED             vitamin B-12 (CYANOCOBALAMIN) 100 MCG tablet  TAKE 1 TABLET BY MOUTH DAILY                   Medications marked \"taking\" at this time  Outpatient Medications Marked as Taking for the 1/4/21 encounter (Virtual Visit) with Hazel Medina MD   Medication Sig Dispense Refill    meclizine (ANTIVERT) 25 MG tablet Take 1 tablet by mouth 3 times daily as needed for Dizziness 15 tablet 0  Blood Glucose Monitoring Suppl (FREESTYLE LITE) CORETTA 1 Device by Does not apply route daily as needed (Diabetes) Use freestyle meter to test blood sugar as needed 1 Device 0    insulin aspart (NOVOLOG FLEXPEN) 100 UNIT/ML injection pen INJECT 12 units with each meals 15 mL 10    ranolazine (RANEXA) 500 MG extended release tablet Take 1 tablet by mouth 2 times daily 180 tablet 2    pregabalin (LYRICA) 75 MG capsule TAKE ONE (1) CAPSULE BY MOUTH TWICE DAILY 60 capsule 2    triamcinolone (KENALOG) 0.1 % cream APPLY TO AFFECTED AREA TWICE DAILY AS DIRECTED 80 g 1    ARIPiprazole (ABILIFY) 5 MG tablet TAKE 1 TABLET BY MOUTH DAILY 30 tablet 2    BRILINTA 90 MG TABS tablet TAKE 1 TABLET BY MOUTH 2 TIMES DAILY 60 tablet 11    pantoprazole (PROTONIX) 20 MG tablet TAKE 1 TABLET BY MOUTH DAILY 90 tablet 3    vitamin B-12 (CYANOCOBALAMIN) 100 MCG tablet TAKE 1 TABLET BY MOUTH DAILY 30 tablet 10    nystatin (NYAMYC) 908668 UNIT/GM powder APPLY TO ABDOMINAL FOLDS EVERY 12 HOURS AS NEEDED 60 g 2    FREESTYLE LITE strip TEST FOUR TIMES DAILY BEFORE MEALS AND NIGHTLY 150 strip 3    B Complex-C-Folic Acid (NEPHRO VITAMINS) 0.8 MG TABS Take by mouth daily       albuterol sulfate HFA (VENTOLIN HFA) 108 (90 Base) MCG/ACT inhaler Inhale 2 puffs into the lungs every 6 hours as needed for Wheezing      sertraline (ZOLOFT) 50 MG tablet TAKE 1 TABLET BY MOUTH DAILY 90 tablet 3    fluticasone (FLOVENT HFA) 110 MCG/ACT inhaler Inhale 2 puffs into the lungs 2 times daily (Patient taking differently: Inhale 2 puffs into the lungs 2 times daily as needed ) 1 Inhaler 12    LANTUS SOLOSTAR 100 UNIT/ML injection pen INJECT 35 UNITS SUBCUTANEOUSLY AT NIGHT 15 mL 10    Magnesium Oxide -Mg Supplement 400 MG CAPS       isosorbide mononitrate (IMDUR) 60 MG extended release tablet Take 1 tablet by mouth daily 90 tablet 3  ipratropium-albuterol (DUONEB) 0.5-2.5 (3) MG/3ML SOLN nebulizer solution Inhale 3 mLs into the lungs every 4 hours as needed for Shortness of Breath 360 mL 0    amLODIPine (NORVASC) 10 MG tablet TAKE 1 TABLET BY MOUTH DAILY 90 tablet 1    aspirin 81 MG tablet Take 1 tablet by mouth daily 90 tablet 3    atorvastatin (LIPITOR) 40 MG tablet Take 1 tablet by mouth daily 90 tablet 3    SURE COMFORT PEN NEEDLES 30G X 8 MM MISC USE AS DIRECTED FIVE TIMES A  each 10    melatonin 3 MG TABS tablet TAKE 1 TABLET BY MOUTH EVERY NIGHT AS NEEDED FOR INSOMNIA 180 tablet 4    nitroGLYCERIN (NITROSTAT) 0.4 MG SL tablet Place 1 tablet under the tongue every 5 minutes as needed for Chest pain      magnesium oxide (MAG-OX) 400 MG tablet Take 400 mg by mouth daily          Medications patient taking as of now reconciled against medications ordered at time of hospital discharge: Yes    Chief Complaint   Patient presents with    Follow-Up from Hospital     [facility]. Positive COVID       HPI    Inpatient course: Discharge summary reviewed- see chart. D/C summary below. Hospital Medicine Discharge Summary     Modesta Lopez                   :  1960                     MRN:  [id number]     Admit date:  2020                    Discharge date:  2020     Admitting Physician:  Emery Arana DO  Primary Care Physician:  Jocelyne Martinez MD        Discharge Diagnoses:       Hyperglycemia- no DKA, h./o DM  ESRD on HD TTS  covid 19 infection- asymptomatic           Chief Complaint   Patient presents with    Hyperglycemia       high blood sugar today      Hospital Course:         Admitted with hyperglycemia. Insulin adjusted by Endo. ESRD, HD resumed by renal. No covid 19 symptoms. Steroids stopped on admission.  Educated on covid 19 isolation.     Interval history/Current status: Continues to have no symptoms of COVID-19. Is receiving HD in isolation at separate facility x 3 weeks total. Notes that she just feels tired. More tired than usual. Is sleeping a lot and has headaches. Review of Systems    There were no vitals filed for this visit. There is no height or weight on file to calculate BMI. Wt Readings from Last 3 Encounters:   01/03/21 207 lb (93.9 kg)   12/27/20 210 lb 15.7 oz (95.7 kg)   09/23/20 218 lb (98.9 kg)     BP Readings from Last 3 Encounters:   01/03/21 (!) 106/57   12/28/20 90/70   10/28/20 120/60       Physical Exam    Patient appears to be alert and oriented to person, place, time, situation and is in no acute distress. Mood appears stable and speech and thought are grossly as baseline. Assessment/Plan:  Renuka Haney was seen today for follow-up from hospital.    Diagnoses and all orders for this visit:    COVID-19  Comments:  REviewed alarm symptoms and self quarantine protocol.     Type 2 diabetes mellitus with diabetic nephropathy, unspecified whether long term insulin use (HCC)  Comments:  improving, fair control; reviewed meds; followed by endocrine            Medical Decision Making: high complexity

## 2021-01-04 NOTE — ED PROVIDER NOTES
[address] [facility]  EMERGENCY DEPARTMENT ENCOUNTER      Pt Name: Farhat Ng  MRN: [id number]  [place] 1960  Date of evaluation: 1/3/2021  Provider: Mile Gutierrez PA-C    CHIEF COMPLAINT       Chief Complaint   Patient presents with    Dizziness         HISTORY OF PRESENT ILLNESS   (Location/Symptom, Timing/Onset, Context/Setting, Quality, Duration, Modifying Factors, Severity)  Note limiting factors. Farhat Ng is a 56 y.o. female who presents to the emergency department with complaints of vertiginous type of dizziness that began after dialysis this morning. Patient was in the emergency department 10 days ago for same symptoms and diagnosed at that time with Covid and hyperglycemia. Blood sugar by EMS was less than 300. Patient denies any headache. Patient denies any abdominal pain, nausea or vomiting. Patient denies any chest pain, palpitations or shortness of breath    HPI    Nursing Notes were reviewed. REVIEW OF SYSTEMS    (2-9 systems for level 4, 10 or more for level 5)     Review of Systems   Constitutional: Negative for diaphoresis and fever. HENT: Negative for hearing loss and trouble swallowing. Eyes: Negative for pain. Respiratory: Negative for apnea and shortness of breath. Cardiovascular: Negative for chest pain. Gastrointestinal: Negative for abdominal pain. Endocrine: Negative. Genitourinary: Negative for hematuria. Musculoskeletal: Negative for neck pain and neck stiffness. Skin: Negative for color change. Allergic/Immunologic: Negative. Neurological: Positive for dizziness. Negative for numbness. Hematological: Negative. Psychiatric/Behavioral: Negative. All other systems reviewed and are negative. Except as noted above the remainder of the review of systems was reviewed and negative.        PAST MEDICAL HISTORY     Past Medical History:   Diagnosis Date    Anxiety     CAD S/P percutaneous coronary angioplasty 2015, 2018 stents per dr Fredric Lesches    CHF (congestive heart failure) ([address]     CKD (chronic kidney disease) stage 4, GFR 15-29 ml/min ([address] 2018    CKD stage 4 due to type 2 diabetes mellitus ([address]     COPD (chronic obstructive pulmonary disease) ([address]     Diabetic nephropathy with proteinuria ([address]     DJD (degenerative joint disease) of knee     Dr Zenaida Aguilar GERD (gastroesophageal reflux disease)     Hemiparesis, left ([address] 2013    [facility] Assisted Living ([facility])    Hemodialysis patient [facility])     History of heart failure     History of seizures     History of type C viral hepatitis     HTN (hypertension)     Hyperlipidemia     Impaired mobility and activities of daily living     Mediastinal lymphadenopathy     Dr Eileen Barno, [facility]    Metabolic syndrome     Moderate persistent asthma without complication 1829    Neurogenic urinary incontinence 2013    Neuropathy in diabetes ([address]     Obesity (BMI 30-39. 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 Bijan Salvador      x1 Dr. Christa Esparza, Dr Medina Wilbur CATH LAB PROCEDURE  10/02/2019    HYSTERECTOMY, TOTAL ABDOMINAL      one ovary intact, Dr Dustin Calvo, menorrhagia    OK TOTAL KNEE ARTHROPLASTY Left 2018 LEFT KNEE TOTAL KNEE ARTHROPLASTY, SHAYNA, NERVE BLOCK performed by Linda Price MD at [address] [place] [address] Right     TOTAL KNEE ARTHROPLASTY  05/19/16    Dr Zenaida Aguilar TUNNELED [address] Right 07/01/2020    tunneled HD catheter per Dr Krupa Zuleta       Previous Medications    ALBUTEROL SULFATE HFA (VENTOLIN HFA) 108 (90 BASE) MCG/ACT INHALER    Inhale 2 puffs into the lungs every 6 hours as needed for Wheezing    AMLODIPINE (NORVASC) 10 MG TABLET    TAKE 1 TABLET BY MOUTH DAILY    ARIPIPRAZOLE (ABILIFY) 5 MG TABLET    TAKE 1 TABLET BY MOUTH DAILY    ASPIRIN 81 MG TABLET    Take 1 tablet by mouth daily    ATORVASTATIN (LIPITOR) 40 MG TABLET    Take 1 tablet by mouth daily    B COMPLEX-C-FOLIC ACID (NEPHRO VITAMINS) 0.8 MG TABS    Take by mouth daily     BLOOD GLUCOSE MONITORING SUPPL (FREESTYLE LITE) CORETTA    1 Device by Does not apply route daily as needed (Diabetes) Use freestyle meter to test blood sugar as needed    BRILINTA 90 MG TABS TABLET    TAKE 1 TABLET BY MOUTH 2 TIMES DAILY    FLUTICASONE (FLOVENT HFA) 110 MCG/ACT INHALER    Inhale 2 puffs into the lungs 2 times daily    FREESTYLE LITE STRIP    TEST FOUR TIMES DAILY BEFORE MEALS AND NIGHTLY    INSULIN ASPART (NOVOLOG FLEXPEN) 100 UNIT/ML INJECTION PEN    INJECT 12 units with each meals    IPRATROPIUM-ALBUTEROL (DUONEB) 0.5-2.5 (3) MG/3ML SOLN NEBULIZER SOLUTION    Inhale 3 mLs into the lungs every 4 hours as needed for Shortness of Breath    ISOSORBIDE MONONITRATE (IMDUR) 60 MG EXTENDED RELEASE TABLET    Take 1 tablet by mouth daily    LANTUS SOLOSTAR 100 UNIT/ML INJECTION PEN    INJECT 35 UNITS SUBCUTANEOUSLY AT NIGHT    MAGNESIUM OXIDE (MAG-OX) 400 MG TABLET    Take 400 mg by mouth daily    MAGNESIUM OXIDE -MG SUPPLEMENT 400 MG CAPS        MELATONIN 3 MG TABS TABLET    TAKE 1 TABLET BY MOUTH EVERY NIGHT AS NEEDED FOR INSOMNIA    MISC.  DEVICES [facility]) MISC    To use with transport outside of the house.     NITROGLYCERIN (NITROSTAT) 0.4 MG SL TABLET    Place 1 tablet under the tongue every 5 minutes as needed for Chest pain    NYSTATIN (NYAMYC) 462508 UNIT/GM POWDER    APPLY TO ABDOMINAL FOLDS EVERY 12 HOURS AS NEEDED    PANTOPRAZOLE (PROTONIX) 20 MG TABLET    TAKE 1 TABLET BY MOUTH DAILY    PREGABALIN (LYRICA) 75 MG CAPSULE    TAKE ONE (1) CAPSULE BY MOUTH TWICE DAILY    RANOLAZINE (RANEXA) 500 MG EXTENDED RELEASE TABLET    Take 1 tablet by mouth 2 times daily    SERTRALINE (ZOLOFT) 50 MG TABLET    TAKE 1 TABLET BY MOUTH DAILY    SURE COMFORT PEN NEEDLES 30G X 8 MM MISC    USE AS DIRECTED FIVE TIMES A DAY    TRIAMCINOLONE (KENALOG) 0.1 % CREAM    APPLY TO AFFECTED AREA TWICE DAILY AS DIRECTED    VITAMIN B-12 (CYANOCOBALAMIN) 100 MCG TABLET    TAKE 1 TABLET BY MOUTH DAILY       ALLERGIES     Codeine and Oxycontin [oxycodone hcl]    FAMILY HISTORY       Family History   Problem Relation Age of Onset    Cancer Mother 76        survived   Logan Peralta Hypertension Father     Diabetes Sister     Mental Illness Sister           SOCIAL HISTORY       Social History     Socioeconomic History    Marital status:      Spouse name: None    Number of children: 2    Years of education: None    Highest education level: None   Occupational History    Occupation: disabled   Social Needs    Financial resource strain: None    Food insecurity     Worry: None     Inability: None    Transportation needs     Medical: None     Non-medical: None   Tobacco Use    Smoking status: Passive Smoke Exposure - Never Smoker    Smokeless tobacco: Never Used   Substance and Sexual Activity    Alcohol use: No     Alcohol/week: 0.0 standard drinks    Drug use: No    Sexual activity: Not Currently   Lifestyle    Physical activity     Days per week: None     Minutes per session: None    Stress: None   Relationships    Social connections     Talks on phone: None     Gets together: None     Attends [employer] service: None Active member of club or organization: None     Attends meetings of clubs or organizations: None     Relationship status: None    Intimate partner violence     Fear of current or ex partner: None     Emotionally abused: None     Physically abused: None     Forced sexual activity: None   Other Topics Concern    None   Social History Narrative    Born in [place], one of 5    Twin sister Reyes, very ill in 2018, [place] [address]    Moved to [place] [facility], , 2 children, one son and one daughter    Worked at [employer], as a nurse's aide    Disabled due to mental illness    Lived at [email or web address], was discharged, returned to independent living in 2017 in the daughter's house and has adjusted well    One son and one daughter, live in the same house with patient, Maria Guadalupe Herrera pays the rent    [employer] reading ([place] [employer])       SCREENINGS                        PHYSICAL EXAM    (up to 7 for level 4, 8 or more for level 5)     ED Triage Vitals [01/03/21 1852]   BP Temp Temp src Pulse Resp SpO2 Height Weight   (!) 126/53 98.1 °F (36.7 °C) -- 80 19 99 % 5' 7\" (1.702 m) 207 lb (93.9 kg)       Physical Exam  Vitals signs and nursing note reviewed. Constitutional:       General: She is not in acute distress. Appearance: She is well-developed. She is not diaphoretic. HENT:      Head: Normocephalic and atraumatic. Mouth/Throat:      Pharynx: No oropharyngeal exudate. Eyes:      General: No scleral icterus. Conjunctiva/sclera: Conjunctivae normal.      Pupils: Pupils are equal, round, and reactive to light. Neck:      Musculoskeletal: Normal range of motion and neck supple. Trachea: No tracheal deviation. Cardiovascular:      Rate and Rhythm: Normal rate. Heart sounds: Normal heart sounds. Pulmonary:      Effort: Pulmonary effort is normal. No respiratory distress. Breath sounds: Normal breath sounds. Abdominal:      General: Bowel sounds are normal. There is no distension. Palpations: Abdomen is soft. Musculoskeletal: Normal range of motion. Skin:     General: Skin is warm and dry. Findings: No erythema or rash. Neurological:      Mental Status: She is alert and oriented to person, place, and time. Cranial Nerves: No cranial nerve deficit. Motor: No abnormal muscle tone. Psychiatric:         Behavior: Behavior normal.         Thought Content:  Thought content normal.         Judgment: Judgment normal.         DIAGNOSTIC RESULTS     EKG: All EKG's are interpreted by the Emergency Department Physician who either signs or Co-signs this chart in the absence of a cardiologist.      RADIOLOGY:   Non-plain film images such as CT, Ultrasound and MRI are read by the radiologist. Plain radiographic images are visualized and preliminarily interpreted by the emergency physician with the below findings:    Neg    Interpretation per the Radiologist below, if available at the time of this note:    CT Head WO Contrast   Final Result            ED BEDSIDE ULTRASOUND:   Performed by ED Physician - none    LABS:  Labs Reviewed   COMPREHENSIVE METABOLIC PANEL - Abnormal; Notable for the following components:       Result Value    Sodium 132 (*)     Potassium 3.1 (*)     Chloride 89 (*)     Glucose 280 (*)     CREATININE 2.49 (*)     GFR Non- 19.5 (*)     GFR  23.6 (*)     Total Bilirubin 1.0 (*)     All other components within normal limits   CBC WITH AUTO DIFFERENTIAL - Abnormal; Notable for the following components:    RBC 2.95 (*)     Hemoglobin 9.1 (*)     Hematocrit 26.7 (*)     RDW 16.2 (*)     Neutrophils Absolute 8.2 (*)     Monocytes Absolute 1.0 (*)     All other components within normal limits   PHOSPHORUS - Abnormal; Notable for the following components:    Phosphorus 2.1 (*)     All other components within normal limits   COVID-19 - Abnormal; Notable for the following components:    SARS-CoV-2, NAAT DETECTED (*)     All other components within normal limits    Narrative:     Krystian Harrell tel. [phone],  COVID results called to and read back by remberto galloway, 01/03/2021 20:02, by  [facility]   MAGNESIUM   URINE RT REFLEX TO CULTURE       All other labs were within normal range or not returned as of this dictation. EMERGENCY DEPARTMENT COURSE and DIFFERENTIAL DIAGNOSIS/MDM:   Vitals:    Vitals:    01/03/21 1852 01/03/21 1957 01/03/21 2053   BP: (!) 126/53 (!) 125/45 (!) 124/52   Pulse: 80 77 73   Resp: 19 20 16   Temp: 98.1 °F (36.7 °C)     SpO2: 99% 98% 98%   Weight: 207 lb (93.9 kg)     Height: 5' 7\" (1.702 m)           MDM      REASSESSMENT      Patient presented the emergency department with complaints of vertiginous type of dizziness that began after dialysis today. Patient was found to be mildly dehydrated with very mild hyponatremia and hypokalemia. Patient was given 250 mL of saline as well as Antivert and 10 mEq of potassium. Patient does state improved symptoms after the meds. Patient will be discharged home with prescription for Antivert and referred to PCP for outpatient follow-up    CONSULTS:  None    PROCEDURES:  Unless otherwise noted below, none     Procedures        FINAL IMPRESSION      1. Dizziness    2. Dehydration    3. Hypokalemia          DISPOSITION/PLAN   DISPOSITION Discharge - Pending Orders Complete 01/03/2021 09:11:48 PM      PATIENT REFERRED TO:  Troy Cherry MD  [place] [address], [facility], [address]  [address] [place] [address]  [phone]    Call in 1 day        DISCHARGE MEDICATIONS:  New Prescriptions    MECLIZINE (ANTIVERT) 25 MG TABLET    Take 1 tablet by mouth 3 times daily as needed for Dizziness     Controlled Substances Monitoring:     RX Monitoring 11/18/2019   Attestation -   Acute Pain Prescriptions Prescription exceeds daily limit for a specific reason. See comments or note. Periodic Controlled Substance Monitoring No signs of potential drug abuse or diversion identified.        (Please note that portions

## 2021-01-04 NOTE — ED NOTES
Nasal swab obtained by this RN, labeled and sent to lab via tube system.       Palma Maciel RN  01/03/21 7507

## 2021-01-04 NOTE — ED NOTES
Discharge education reviewed. Patient instructed to take Meclizine as prescribed and to follow up with PCP. Pt understands to come back to the ED with any new or worsening symptoms. No questions or concerns at this time.          Polina Holcomb RN  01/03/21 1480

## 2021-01-04 NOTE — ED NOTES
Patient up to [facility] passed a large semi formed stool.  Unable to collect urine due to stool in it     Valentina Oh RN  01/03/21 1939

## 2021-01-04 NOTE — CARE COORDINATION
[facility] Transitions Follow Up Call    2021    Patient: France Sosa  Patient : 1960   MRN: [id number]  Reason for Admission: -2020 [address] CV-19, Hyperglycemia. 1/3/2021 [address] ED Dizziness, Hyponatremia, Dehydration. Discharge Date: 1/3/21 RARS: Readmission Risk Score: 38    Care Transitions request call back to P: [phone] for post ED outreach. Care Transitions Subsequent and Final Call    Subsequent and Final Calls  Care Transitions Interventions  Other Interventions:            Follow Up  Future Appointments   Date Time Provider Aminata Cortes   2021 12:45 PM Chasity Mims MD [address]

## 2021-01-05 ENCOUNTER — CARE COORDINATION (OUTPATIENT)
Dept: CASE MANAGEMENT | Age: 61
End: 2021-01-05

## 2021-01-05 NOTE — CARE COORDINATION
Patient: Randal Rock     Patient : 1960   MRN: [id number]         Reason for Admission: -2020 ED [facility] CV-19, Hyperglycemia. 1/3/2021 ED [facility] ED Dizziness, Hyponatremia, Dehydration. Discharge Date: 1/3/21         RARS: Readmission Risk Score: 38     Care Transitions request call back to P: [phone] for post ED outreach. Two call attempts made.

## 2021-01-07 ENCOUNTER — CARE COORDINATION (OUTPATIENT)
Dept: CASE MANAGEMENT | Age: 61
End: 2021-01-07

## 2021-01-07 DIAGNOSIS — U07.1 COVID-19: Primary | ICD-10-CM

## 2021-01-07 PROCEDURE — 1111F DSCHRG MED/CURRENT MED MERGE: CPT | Performed by: FAMILY MEDICINE

## 2021-01-07 NOTE — TELEPHONE ENCOUNTER
Patient called her walker stefan and she would like a new prescription for a rollator with a basket faxed over to [facility].     Rx request   Requested Prescriptions     Pending Prescriptions Disp Refills    Misc.  Devices (BARIATRIC ROLLATOR) MISC 1 each 0     Sig: Rolllator with a basket     LOV 1/4/2021  Next Visit Date:  Future Appointments   Date Time Provider Aminata Cortes   1/27/2021 12:45 PM Louis Saldivar MD [facility]   5/4/2021 10:00 AM Ruma Arredondo MD [address] [place] [address]

## 2021-01-07 NOTE — TELEPHONE ENCOUNTER
Rx request   Requested Prescriptions     Pending Prescriptions Disp Refills    triamcinolone (KENALOG) 0.1 % cream 80 g 1     Sig: APPLY TO AFFECTED AREA TWICE DAILY AS DIRECTED     LOV 1/4/2021  Next Visit Date:  Future Appointments   Date Time Provider Aminata Cortes   1/27/2021 12:45 PM Alison Prakash MD [facility]   5/4/2021 10:00 AM Sisi Eubanks MD [address] [place] [address]

## 2021-01-07 NOTE — CARE COORDINATION
[facility] ED Follow Up Call    2021    Patient: Randal Carrasquillo Patient : 1960   MRN: [id number]  Reason for Admission: -2020 [address] CV-19, Hyperglycemia. 1/3/2021 [address] ED Dizziness, Hyponatremia, Dehydration. CT/CV-19 Transitions      Challenges to be reviewed by the provider   Additional needs identified to be addressed with provider Yes  none    Discussed COVID-19 related testing which was available at this time. Test results were positive. Patient informed of results, if available? Yes         Method of communication with provider : chart routing for walker script. Advance Care Planning:   Does patient have an Advance Directive:  reviewed and current. Was this a readmission? No  Patient stated reason for admission: CV-19, High BS  Patients top risk factors for readmission: functional cognitive ability, ineffective coping, lack of knowledge about disease, level of motivation and medical condition    Care Transition Nurse (CTN) contacted the patient by telephone to perform post hospital discharge assessment. Verified name and  with patient as identifiers. Provided introduction to self, and explanation of the CTN role. CTN reviewed discharge instructions, medical action plan and red flags with patient who verbalized understanding. Patient given an opportunity to ask questions and does not have any further questions or concerns at this time. Were discharge instructions available to patient? Yes. Reviewed appropriate site of care based on symptoms and resources available to patient including: Reviewed worsened symptoms of CV-19 to report/go to ED. Reviewed daily BS management. . The patient agrees to contact the PCP office for questions related to their healthcare. Medication reconciliation was performed with patient, who verbalizes understanding of administration of home medications.  Advised obtaining a 90-day supply of all daily and as-needed medications. Covid Risk Education    Patient has following risk factors of: diabetes and chronic kidney disease. Education provided regarding infection prevention, and signs and symptoms of COVID-19 and when to seek medical attention with patient who verbalized understanding. Discussed exposure protocols and quarantine From CDC: Are you at higher risk for severe illness?   and given an opportunity for questions and concerns. The patient agrees to contact the COVID-19 hotline [phone] or PCP office for questions related to COVID-19. For more information on steps you can take to protect yourself, see CDC's How to Protect Yourself     Patient/family/caregiver given information for [employer] and agrees to enroll no  Patient's preferred e-mail: declines  Patient's preferred phone number: declines    Discussed follow-up appointments. If no appointment was previously scheduled, appointment scheduling offered: Attended appt 1/4/2021. Is follow up appointment scheduled within 7 days of discharge? Yes  Non-[facility] follow up appointment(s):     Plan for follow-up call in 5-7 days based on severity of symptoms and risk factors. Plan for next call: symptom management-CV-19, BS ranges. CTN provided contact information for future needs. CTN spoke w/ pt who was at [facility]. States she has been getting HD for approx 6 months now. Denies any new/worsened edema. Was unable to report any home BS ranges and did not check BS today but states she checks it \"most times\". Advised on the importance of good BS management for best immune function w/ covid recovery, v/u. Denies any SOB, cough, fever, chills, or flu-like symptoms. States \" I have no symptoms. I was surprised when they told me I had it\". States needs new walker and PCP routed. Advised to follow quarantine recommendations, v/u. Denies any home needs. Enc to sleep in prone position for better air exchange during HS.  Enc to hydrate and eat nutritiously.        Care Transitions ED Follow Up    Care Transitions Interventions  Do you have a copy of your discharge instructions?: Yes   Do you understand what to report and when to return?: Yes   Are you following your discharge instructions?: Yes   Do you have all of your prescriptions and are they filled?: Yes   Post Acute Services:  (Comment: Sasha Huynh [facility])         Patient DME: Anny engle

## 2021-01-08 RX ORDER — TRIAMCINOLONE ACETONIDE 1 MG/G
CREAM TOPICAL
Qty: 80 G | Refills: 1 | OUTPATIENT
Start: 2021-01-08

## 2021-01-08 NOTE — TELEPHONE ENCOUNTER
Please clarify whether or notpatient needs refills. I think this was an automated request. If yes, what does she use it for?

## 2021-01-12 ENCOUNTER — HOSPITAL ENCOUNTER (INPATIENT)
Age: 61
LOS: 1 days | Discharge: HOME | DRG: 177 | End: 2021-01-14
Attending: INTERNAL MEDICINE | Admitting: INTERNAL MEDICINE
Payer: MEDICARE

## 2021-01-12 ENCOUNTER — APPOINTMENT (OUTPATIENT)
Dept: GENERAL RADIOLOGY | Age: 61
DRG: 177 | End: 2021-01-12
Payer: MEDICARE

## 2021-01-12 ENCOUNTER — TELEPHONE (OUTPATIENT)
Dept: OTHER | Facility: CLINIC | Age: 61
End: 2021-01-12

## 2021-01-12 DIAGNOSIS — R07.9 CHEST PAIN, UNSPECIFIED TYPE: Primary | ICD-10-CM

## 2021-01-12 LAB
ALBUMIN SERPL-MCNC: 4.4 G/DL (ref 3.5–4.6)
ALP BLD-CCNC: 149 U/L (ref 40–130)
ALT SERPL-CCNC: 15 U/L (ref 0–33)
ANION GAP SERPL CALCULATED.3IONS-SCNC: 14 MEQ/L (ref 9–15)
AST SERPL-CCNC: 30 U/L (ref 0–35)
BASOPHILS ABSOLUTE: 0 K/UL (ref 0–0.2)
BASOPHILS RELATIVE PERCENT: 0.5 %
BILIRUB SERPL-MCNC: 1 MG/DL (ref 0.2–0.7)
BUN BLDV-MCNC: 13 MG/DL (ref 8–23)
CALCIUM SERPL-MCNC: 9.8 MG/DL (ref 8.5–9.9)
CHLORIDE BLD-SCNC: 91 MEQ/L (ref 95–107)
CO2: 29 MEQ/L (ref 20–31)
CREAT SERPL-MCNC: 2.04 MG/DL (ref 0.5–0.9)
EKG ATRIAL RATE: 78 BPM
EKG P AXIS: 51 DEGREES
EKG P-R INTERVAL: 276 MS
EKG Q-T INTERVAL: 450 MS
EKG QRS DURATION: 132 MS
EKG QTC CALCULATION (BAZETT): 513 MS
EKG R AXIS: -52 DEGREES
EKG T AXIS: 75 DEGREES
EKG VENTRICULAR RATE: 78 BPM
EOSINOPHILS ABSOLUTE: 0.1 K/UL (ref 0–0.7)
EOSINOPHILS RELATIVE PERCENT: 1.7 %
GFR AFRICAN AMERICAN: 29.8
GFR NON-AFRICAN AMERICAN: 24.6
GLOBULIN: 3.6 G/DL (ref 2.3–3.5)
GLUCOSE BLD-MCNC: 226 MG/DL (ref 60–115)
GLUCOSE BLD-MCNC: 256 MG/DL (ref 70–99)
HCT VFR BLD CALC: 28.7 % (ref 37–47)
HEMOGLOBIN: 10.1 G/DL (ref 12–16)
LYMPHOCYTES ABSOLUTE: 1.4 K/UL (ref 1–4.8)
LYMPHOCYTES RELATIVE PERCENT: 18.3 %
MAGNESIUM: 2 MG/DL (ref 1.7–2.4)
MCH RBC QN AUTO: 31.1 PG (ref 27–31.3)
MCHC RBC AUTO-ENTMCNC: 35.2 % (ref 33–37)
MCV RBC AUTO: 88.4 FL (ref 82–100)
MONOCYTES ABSOLUTE: 0.6 K/UL (ref 0.2–0.8)
MONOCYTES RELATIVE PERCENT: 8.2 %
NEUTROPHILS ABSOLUTE: 5.6 K/UL (ref 1.4–6.5)
NEUTROPHILS RELATIVE PERCENT: 71.3 %
PDW BLD-RTO: 15.7 % (ref 11.5–14.5)
PERFORMED ON: ABNORMAL
PLATELET # BLD: 179 K/UL (ref 130–400)
POTASSIUM SERPL-SCNC: 3.6 MEQ/L (ref 3.4–4.9)
RBC # BLD: 3.25 M/UL (ref 4.2–5.4)
SARS-COV-2, NAAT: DETECTED
SODIUM BLD-SCNC: 134 MEQ/L (ref 135–144)
TOTAL PROTEIN: 8 G/DL (ref 6.3–8)
TROPONIN: 0.02 NG/ML (ref 0–0.01)
WBC # BLD: 7.9 K/UL (ref 4.8–10.8)

## 2021-01-12 PROCEDURE — 80053 COMPREHEN METABOLIC PANEL: CPT

## 2021-01-12 PROCEDURE — 84484 ASSAY OF TROPONIN QUANT: CPT

## 2021-01-12 PROCEDURE — 36415 COLL VENOUS BLD VENIPUNCTURE: CPT

## 2021-01-12 PROCEDURE — U0002 COVID-19 LAB TEST NON-CDC: HCPCS

## 2021-01-12 PROCEDURE — G0378 HOSPITAL OBSERVATION PER HR: HCPCS

## 2021-01-12 PROCEDURE — 85025 COMPLETE CBC W/AUTO DIFF WBC: CPT

## 2021-01-12 PROCEDURE — 71045 X-RAY EXAM CHEST 1 VIEW: CPT

## 2021-01-12 PROCEDURE — 83735 ASSAY OF MAGNESIUM: CPT

## 2021-01-12 PROCEDURE — 99283 EMERGENCY DEPT VISIT LOW MDM: CPT

## 2021-01-12 PROCEDURE — 93005 ELECTROCARDIOGRAM TRACING: CPT | Performed by: NURSE PRACTITIONER

## 2021-01-12 ASSESSMENT — ENCOUNTER SYMPTOMS
BLOOD IN STOOL: 0
COUGH: 0
EYE REDNESS: 0
SHORTNESS OF BREATH: 1
EYE DISCHARGE: 0
BACK PAIN: 0
TROUBLE SWALLOWING: 0
SORE THROAT: 0
WHEEZING: 0
EYE PAIN: 0
DIARRHEA: 0
NAUSEA: 0
RHINORRHEA: 0
VOMITING: 0
ABDOMINAL PAIN: 0
CONSTIPATION: 0
COLOR CHANGE: 0

## 2021-01-12 ASSESSMENT — PAIN SCALES - GENERAL: PAINLEVEL_OUTOF10: 7

## 2021-01-12 ASSESSMENT — PAIN DESCRIPTION - FREQUENCY: FREQUENCY: INTERMITTENT

## 2021-01-12 ASSESSMENT — PAIN DESCRIPTION - DESCRIPTORS: DESCRIPTORS: SHARP

## 2021-01-12 ASSESSMENT — PAIN DESCRIPTION - LOCATION: LOCATION: CHEST

## 2021-01-12 ASSESSMENT — PAIN DESCRIPTION - PAIN TYPE: TYPE: ACUTE PAIN

## 2021-01-12 NOTE — ED PROVIDER NOTES
[address] ENCOUNTER      Pt Name: Karen Mercado  MRN: [id number]  [place] 1960  Date of evaluation: 1/12/2021  Provider: LORETO Starr CNP    CHIEF COMPLAINT       Chief Complaint   Patient presents with    Chest Pain         HISTORY OF PRESENT ILLNESS   (Location/Symptom, Timing/Onset,Context/Setting, Quality, Duration, Modifying Factors, Severity)  Note limiting factors. Karen Mercado is a 56 y.o. female who presents to the emergency department with a chart reviewed past medical history of hyperlipidemia, hypertension, coronary artery disease, schizophrenia, viral hepatitis, and diabetes for chief complaint of chest pain. Patient states that she was sent over from Dr. Cecelia Lim when she called for chest pain that started at 8 AM this morning. It has been midsternal 7 out of 10 intermittent. Denies diaphoresis or dizziness. Reports shortness of breath. She states she was in dialysis and got short of breath and was placed on O2. She is 97% on room air upon arrival to the ER. She has no alleviating or modifying factors at this time. Nursing Notes were reviewed. REVIEW OF SYSTEMS    (2-9 systems for level 4, 10 or more for level 5)     Review of Systems   Constitutional: Negative for activity change, appetite change, fatigue and fever. HENT: Negative for congestion, ear pain, rhinorrhea, sore throat and trouble swallowing. Eyes: Negative for pain, discharge and redness. Respiratory: Positive for shortness of breath. Negative for cough and wheezing. Cardiovascular: Positive for chest pain. Negative for palpitations. Gastrointestinal: Negative for abdominal pain, blood in stool, constipation, diarrhea, nausea and vomiting. Endocrine: Negative for polydipsia and polyuria. Genitourinary: Negative for decreased urine volume, dysuria, flank pain and hematuria. Musculoskeletal: Negative for arthralgias, back pain and myalgias.    Skin: Negative impairment     Vitamin D deficiency      Past Surgical History:   Procedure Laterality Date     SECTION      x1    COLONOSCOPY  2014    Dr. Criselda Marion      x1 Dr. Yunior Willams, Dr Mariaa Breen CATH LAB PROCEDURE  10/02/2019    HYSTERECTOMY, TOTAL ABDOMINAL      one ovary intact, Dr Kedar Hunter, menorrhagia    [place] TOTAL KNEE ARTHROPLASTY Left 2018    LEFT KNEE TOTAL KNEE ARTHROPLASTY, SHAYNA, NERVE BLOCK performed by Naomie Parker MD at [facility] Right     TOTAL KNEE ARTHROPLASTY  16    Dr [employer] TUNNELED [address] Right 2020    tunneled HD catheter per Dr Darden Duty Marital status:      Spouse name: None    Number of children: 2    Years of education: None    Highest education level: None   Occupational History    Occupation: disabled   Social Needs    Financial resource strain: None    Food insecurity     Worry: None     Inability: None    Transportation needs     Medical: None     Non-medical: None   Tobacco Use    Smoking status: Passive Smoke Exposure - Never Smoker    Smokeless tobacco: Never Used   Substance and Sexual Activity    Alcohol use: No     Alcohol/week: 0.0 standard drinks    Drug use: No    Sexual activity: Not Currently   Lifestyle    Physical activity     Days per week: None     Minutes per session: None    Stress: None   Relationships    Social connections     Talks on phone: None     Gets together: None     Attends Anabaptism service: None     Active member of club or organization: None     Attends meetings of clubs or organizations: None     Relationship status: None    Intimate partner violence     Fear of current or ex partner: None     Emotionally abused: None     Physically abused: None     Forced sexual activity: None   Other Topics Concern    None   Social History Narrative Born in [place], one of 5    Twin sister Radha Rodrigues, very ill in 2018, [place] [address]    Moved to [address] [place] [address], , 2 children, one son and one daughter    Worked at [employer], as a nurse's aide    Disabled due to mental illness    Lived at [facility], was discharged, returned to independent living in 2017 in the daughter's house and has adjusted well    One son and one daughter, live in the same house with patient, Zunilda Orozco pays the rent    [employer] reading (misteries)       SCREENINGS    [place] Coma Scale  Eye Opening: Spontaneous  Best Verbal Response: Oriented  Best Motor Response: Obeys commands  Michele Coma Scale Score: 15        PHYSICAL EXAM    (up to 7 for level 4, 8 or more for level 5)     ED Triage Vitals [01/12/21 1521]   BP Temp Temp src Pulse Resp SpO2 Height Weight   113/64 98.1 °F (36.7 °C) -- 77 18 100 % 5' 7\" (1.702 m) 207 lb (93.9 kg)       Physical Exam  Vitals signs and nursing note reviewed. Constitutional:       General: She is not in acute distress. Appearance: She is well-developed. She is not diaphoretic. HENT:      Head: Normocephalic and atraumatic. Nose: Nose normal.   Eyes:      Conjunctiva/sclera: Conjunctivae normal.      Pupils: Pupils are equal, round, and reactive to light. Neck:      Musculoskeletal: Normal range of motion and neck supple. Cardiovascular:      Rate and Rhythm: Normal rate and regular rhythm. Heart sounds: Normal heart sounds. Pulmonary:      Effort: Pulmonary effort is normal. No respiratory distress. Breath sounds: Normal breath sounds. Abdominal:      General: Bowel sounds are normal.      Palpations: Abdomen is soft. Tenderness: There is no abdominal tenderness. Skin:     General: Skin is warm and dry. Capillary Refill: Capillary refill takes less than 2 seconds. Findings: No rash. Neurological:      Mental Status: She is alert and oriented to person, place, and time. Cranial Nerves:  No cranial nerve deficit. Psychiatric:         Behavior: Behavior normal.         RESULTS     EKG: All EKG's are interpreted by the Emergency Department Physician who either signs or Co-signsthis chart in the absence of a cardiologist.    Sinus rhythm rate of 78 bpm, no ST elevations, QT of 450    RADIOLOGY:   Non-plain filmimages such as CT, Ultrasound and MRI are read by the radiologist. Plain radiographic images are visualized and preliminarily interpreted by the emergency physician with the below findings:        Interpretation per the Radiologist below, if available at the time ofthis note:    XR CHEST PORTABLE   Final Result      No acute radiographic abnormality or significant interval change.                   ED BEDSIDE ULTRASOUND:   Performed by ED Physician - none    LABS:  Labs Reviewed   COMPREHENSIVE METABOLIC PANEL - Abnormal; Notable for the following components:       Result Value    Sodium 134 (*)     Chloride 91 (*)     Glucose 256 (*)     CREATININE 2.04 (*)     GFR Non- 24.6 (*)     GFR  29.8 (*)     Total Bilirubin 1.0 (*)     Alkaline Phosphatase 149 (*)     Globulin 3.6 (*)     All other components within normal limits   CBC WITH AUTO DIFFERENTIAL - Abnormal; Notable for the following components:    RBC 3.25 (*)     Hemoglobin 10.1 (*)     Hematocrit 28.7 (*)     RDW 15.7 (*)     All other components within normal limits   TROPONIN - Abnormal; Notable for the following components:    Troponin 0.018 (*)     All other components within normal limits    Narrative:     Guerrero Bennett tel. [phone],  TROP results called to and read back by  Eufemia Craft, 01/12/2021 17:08, by  Gardiner Hashimoto   COVID-19 - Abnormal; Notable for the following components:    SARS-CoV-2, NAAT DETECTED (*)     All other components within normal limits    Narrative:     Guerrero Bennett tel. [phone],  COVID-19 result called to [facility] ED, 01/12/2021 18:39, by [address] [place] [address] PANEL - Abnormal; Notable for the following components:    Sodium 131 (*)     Chloride 93 (*)     Glucose 221 (*)     CREATININE 2.77 (*)     GFR Non- 17.3 (*)     GFR  20.9 (*)     All other components within normal limits   HEMOGLOBIN A1C - Abnormal; Notable for the following components:    Hemoglobin A1C 7.2 (*)     All other components within normal limits    Narrative:     Collection has been rescheduled by ROSEMARIE at 01/13/2021 15:50 Reason: Add   on   POCT GLUCOSE - Abnormal; Notable for the following components:    POC Glucose 226 (*)     All other components within normal limits   POCT GLUCOSE - Abnormal; Notable for the following components:    POC Glucose 203 (*)     All other components within normal limits   POCT GLUCOSE - Abnormal; Notable for the following components:    POC Glucose 238 (*)     All other components within normal limits   POCT GLUCOSE - Abnormal; Notable for the following components:    POC Glucose 241 (*)     All other components within normal limits   POCT GLUCOSE - Abnormal; Notable for the following components:    POC Glucose 250 (*)     All other components within normal limits   POCT GLUCOSE - Abnormal; Notable for the following components:    POC Glucose 250 (*)     All other components within normal limits   POCT GLUCOSE - Abnormal; Notable for the following components:    POC Glucose 348 (*)     All other components within normal limits   POCT GLUCOSE - Abnormal; Notable for the following components:    POC Glucose 177 (*)     All other components within normal limits   MAGNESIUM   TROPONIN   POCT GLUCOSE   POCT GLUCOSE   POCT GLUCOSE   POCT GLUCOSE   POCT GLUCOSE       All other labs were within normal range or not returned as of this dictation.     EMERGENCY DEPARTMENT COURSE and DIFFERENTIAL DIAGNOSIS/MDM:   Vitals:    Vitals:    01/14/21 1300 01/14/21 1335 01/14/21 1345 01/14/21 1350   BP: (!) 130/59 135/78 (!) 136/53 (!) 136/53   Pulse: 74 76 89 86   Resp: 14 16 18 20   Temp:  97.9 °F (36.6 °C) 97.8 °F (36.6 °C) 97.9 °F (36.6 °C)   TempSrc:   Oral Oral   SpO2:   100% 94%   Weight:       Height:                MDM   Patient is a 49-year-old female presenting to the ER with a chief complaint of chest pain. Hemodynamically stable nontoxic-appearing. Cardiac work-up initiated. Lab work unremarkable. Chest x-ray is negative. Patient remains 94% on room air. Patient is a patient of Dr. Sara Chavez and he is going to told her to come to the emergency department. I think it would be in patient's best interest to have a chest pain rule out stay in the hospital.  I spoke to , cardiologist, patient is admitted to the service of cardiology in a stable condition with stable vital signs. CRITICAL CARE TIME       CONSULTS:  IP CONSULT TO NEPHROLOGY  IP CONSULT TO ENDOCRINOLOGY  IP CONSULT TO HOSPITALIST    PROCEDURES:  Unless otherwise noted below, none     Procedures    FINAL IMPRESSION      1. Chest pain, unspecified type          DISPOSITION/PLAN   DISPOSITION Admitted 01/12/2021 05:23:19 PM      PATIENT REFERRED TO:  Veroniac Melton MD  [facility], [address] [place] [address]  [phone]    On 1/18/2021  VIRTUAL VISIT @ 2PM, For hospital followup., IF YOU CAN NOT MAKE THIS APPOINTMENT PLEASE CALL.     Delvis Mireles [id number]  [address]  [phone]    Schedule an appointment as soon as possible for a visit in 1 week  Call to schedule cardiology follow-up      DISCHARGE MEDICATIONS:  Discharge Medication List as of 1/14/2021  4:04 PM      START taking these medications    Details   metoprolol tartrate (LOPRESSOR) 25 MG tablet Take 0.5 tablets by mouth 2 times daily, Disp-30 tablet, R-1Normal                (Please notethat portions of this note were completed with a voice recognition program.  Efforts were made to edit the dictations but occasionally words are mis-transcribed.)    Gilbert Real, LORETO - CNP (electronically signed)  Attending Emergency Physician          [facility], LORETO - CNP  01/15/21 8505

## 2021-01-13 LAB
ANION GAP SERPL CALCULATED.3IONS-SCNC: 11 MEQ/L (ref 9–15)
BUN BLDV-MCNC: 18 MG/DL (ref 8–23)
CALCIUM SERPL-MCNC: 9 MG/DL (ref 8.5–9.9)
CHLORIDE BLD-SCNC: 93 MEQ/L (ref 95–107)
CO2: 27 MEQ/L (ref 20–31)
CREAT SERPL-MCNC: 2.77 MG/DL (ref 0.5–0.9)
GFR AFRICAN AMERICAN: 20.9
GFR NON-AFRICAN AMERICAN: 17.3
GLUCOSE BLD-MCNC: 203 MG/DL (ref 60–115)
GLUCOSE BLD-MCNC: 221 MG/DL (ref 70–99)
GLUCOSE BLD-MCNC: 238 MG/DL (ref 60–115)
GLUCOSE BLD-MCNC: 241 MG/DL (ref 60–115)
GLUCOSE BLD-MCNC: 250 MG/DL (ref 60–115)
GLUCOSE BLD-MCNC: 250 MG/DL (ref 60–115)
HBA1C MFR BLD: 7.2 % (ref 4.8–5.9)
PERFORMED ON: ABNORMAL
POTASSIUM SERPL-SCNC: 3.5 MEQ/L (ref 3.4–4.9)
SODIUM BLD-SCNC: 131 MEQ/L (ref 135–144)
TROPONIN: <0.01 NG/ML (ref 0–0.01)

## 2021-01-13 PROCEDURE — 6360000002 HC RX W HCPCS: Performed by: INTERNAL MEDICINE

## 2021-01-13 PROCEDURE — 84484 ASSAY OF TROPONIN QUANT: CPT

## 2021-01-13 PROCEDURE — 5A1D70Z PERFORMANCE OF URINARY FILTRATION, INTERMITTENT, LESS THAN 6 HOURS PER DAY: ICD-10-PCS | Performed by: STUDENT IN AN ORGANIZED HEALTH CARE EDUCATION/TRAINING PROGRAM

## 2021-01-13 PROCEDURE — 94640 AIRWAY INHALATION TREATMENT: CPT

## 2021-01-13 PROCEDURE — 99221 1ST HOSP IP/OBS SF/LOW 40: CPT | Performed by: INTERNAL MEDICINE

## 2021-01-13 PROCEDURE — 6370000000 HC RX 637 (ALT 250 FOR IP): Performed by: PHYSICIAN ASSISTANT

## 2021-01-13 PROCEDURE — 2060000000 HC ICU INTERMEDIATE R&B

## 2021-01-13 PROCEDURE — 6370000000 HC RX 637 (ALT 250 FOR IP): Performed by: INTERNAL MEDICINE

## 2021-01-13 PROCEDURE — 99222 1ST HOSP IP/OBS MODERATE 55: CPT | Performed by: INTERNAL MEDICINE

## 2021-01-13 PROCEDURE — 94761 N-INVAS EAR/PLS OXIMETRY MLT: CPT

## 2021-01-13 PROCEDURE — 83036 HEMOGLOBIN GLYCOSYLATED A1C: CPT

## 2021-01-13 PROCEDURE — 36415 COLL VENOUS BLD VENIPUNCTURE: CPT

## 2021-01-13 PROCEDURE — 80048 BASIC METABOLIC PNL TOTAL CA: CPT

## 2021-01-13 PROCEDURE — 2580000003 HC RX 258: Performed by: INTERNAL MEDICINE

## 2021-01-13 RX ORDER — INSULIN GLARGINE 100 [IU]/ML
35 INJECTION, SOLUTION SUBCUTANEOUS NIGHTLY
Status: DISCONTINUED | OUTPATIENT
Start: 2021-01-13 | End: 2021-01-13

## 2021-01-13 RX ORDER — MECLIZINE HCL 12.5 MG/1
25 TABLET ORAL 3 TIMES DAILY PRN
Status: DISCONTINUED | OUTPATIENT
Start: 2021-01-13 | End: 2021-01-14 | Stop reason: HOSPADM

## 2021-01-13 RX ORDER — SODIUM CHLORIDE 9 MG/ML
INJECTION, SOLUTION INTRAVENOUS CONTINUOUS
Status: DISCONTINUED | OUTPATIENT
Start: 2021-01-13 | End: 2021-01-14

## 2021-01-13 RX ORDER — LANOLIN ALCOHOL/MO/W.PET/CERES
3 CREAM (GRAM) TOPICAL NIGHTLY PRN
Status: DISCONTINUED | OUTPATIENT
Start: 2021-01-13 | End: 2021-01-14 | Stop reason: HOSPADM

## 2021-01-13 RX ORDER — SODIUM CHLORIDE 0.9 % (FLUSH) 0.9 %
10 SYRINGE (ML) INJECTION PRN
Status: DISCONTINUED | OUTPATIENT
Start: 2021-01-13 | End: 2021-01-14 | Stop reason: HOSPADM

## 2021-01-13 RX ORDER — ATORVASTATIN CALCIUM 80 MG/1
80 TABLET, FILM COATED ORAL NIGHTLY
Status: DISCONTINUED | OUTPATIENT
Start: 2021-01-13 | End: 2021-01-13

## 2021-01-13 RX ORDER — INSULIN GLARGINE 100 [IU]/ML
50 INJECTION, SOLUTION SUBCUTANEOUS NIGHTLY
Status: DISCONTINUED | OUTPATIENT
Start: 2021-01-13 | End: 2021-01-14 | Stop reason: HOSPADM

## 2021-01-13 RX ORDER — AMLODIPINE BESYLATE 10 MG/1
10 TABLET ORAL DAILY
Status: DISCONTINUED | OUTPATIENT
Start: 2021-01-13 | End: 2021-01-14 | Stop reason: HOSPADM

## 2021-01-13 RX ORDER — FLUTICASONE PROPIONATE 110 UG/1
2 AEROSOL, METERED RESPIRATORY (INHALATION) 2 TIMES DAILY
Status: DISCONTINUED | OUTPATIENT
Start: 2021-01-13 | End: 2021-01-14 | Stop reason: HOSPADM

## 2021-01-13 RX ORDER — CLOPIDOGREL BISULFATE 75 MG/1
75 TABLET ORAL DAILY
Status: DISCONTINUED | OUTPATIENT
Start: 2021-01-13 | End: 2021-01-13

## 2021-01-13 RX ORDER — NICOTINE POLACRILEX 4 MG
15 LOZENGE BUCCAL PRN
Status: DISCONTINUED | OUTPATIENT
Start: 2021-01-13 | End: 2021-01-14 | Stop reason: HOSPADM

## 2021-01-13 RX ORDER — ACETAMINOPHEN 325 MG/1
650 TABLET ORAL EVERY 4 HOURS PRN
Status: DISCONTINUED | OUTPATIENT
Start: 2021-01-13 | End: 2021-01-14 | Stop reason: HOSPADM

## 2021-01-13 RX ORDER — LANOLIN ALCOHOL/MO/W.PET/CERES
400 CREAM (GRAM) TOPICAL DAILY
Status: DISCONTINUED | OUTPATIENT
Start: 2021-01-13 | End: 2021-01-14 | Stop reason: HOSPADM

## 2021-01-13 RX ORDER — ISOSORBIDE MONONITRATE 60 MG/1
60 TABLET, EXTENDED RELEASE ORAL DAILY
Status: DISCONTINUED | OUTPATIENT
Start: 2021-01-13 | End: 2021-01-14 | Stop reason: HOSPADM

## 2021-01-13 RX ORDER — NITROGLYCERIN 0.4 MG/1
0.4 TABLET SUBLINGUAL EVERY 5 MIN PRN
Status: DISCONTINUED | OUTPATIENT
Start: 2021-01-13 | End: 2021-01-14 | Stop reason: HOSPADM

## 2021-01-13 RX ORDER — ARIPIPRAZOLE 5 MG/1
5 TABLET ORAL DAILY
Status: DISCONTINUED | OUTPATIENT
Start: 2021-01-13 | End: 2021-01-14 | Stop reason: HOSPADM

## 2021-01-13 RX ORDER — SODIUM CHLORIDE 0.9 % (FLUSH) 0.9 %
10 SYRINGE (ML) INJECTION EVERY 12 HOURS SCHEDULED
Status: DISCONTINUED | OUTPATIENT
Start: 2021-01-13 | End: 2021-01-14 | Stop reason: HOSPADM

## 2021-01-13 RX ORDER — DEXTROSE MONOHYDRATE 25 G/50ML
12.5 INJECTION, SOLUTION INTRAVENOUS PRN
Status: DISCONTINUED | OUTPATIENT
Start: 2021-01-13 | End: 2021-01-14 | Stop reason: HOSPADM

## 2021-01-13 RX ORDER — ASPIRIN 81 MG/1
81 TABLET, CHEWABLE ORAL DAILY
Status: DISCONTINUED | OUTPATIENT
Start: 2021-01-13 | End: 2021-01-14 | Stop reason: HOSPADM

## 2021-01-13 RX ORDER — ATORVASTATIN CALCIUM 40 MG/1
40 TABLET, FILM COATED ORAL NIGHTLY
Status: DISCONTINUED | OUTPATIENT
Start: 2021-01-13 | End: 2021-01-14 | Stop reason: HOSPADM

## 2021-01-13 RX ORDER — ONDANSETRON 2 MG/ML
4 INJECTION INTRAMUSCULAR; INTRAVENOUS EVERY 8 HOURS PRN
Status: DISCONTINUED | OUTPATIENT
Start: 2021-01-13 | End: 2021-01-14 | Stop reason: HOSPADM

## 2021-01-13 RX ORDER — IPRATROPIUM BROMIDE AND ALBUTEROL SULFATE 2.5; .5 MG/3ML; MG/3ML
3 SOLUTION RESPIRATORY (INHALATION) EVERY 4 HOURS PRN
Status: DISCONTINUED | OUTPATIENT
Start: 2021-01-13 | End: 2021-01-14 | Stop reason: HOSPADM

## 2021-01-13 RX ORDER — RANOLAZINE 500 MG/1
500 TABLET, EXTENDED RELEASE ORAL 2 TIMES DAILY
Status: DISCONTINUED | OUTPATIENT
Start: 2021-01-13 | End: 2021-01-14 | Stop reason: HOSPADM

## 2021-01-13 RX ORDER — PANTOPRAZOLE SODIUM 20 MG/1
20 TABLET, DELAYED RELEASE ORAL DAILY
Status: DISCONTINUED | OUTPATIENT
Start: 2021-01-13 | End: 2021-01-14 | Stop reason: HOSPADM

## 2021-01-13 RX ORDER — DEXTROSE MONOHYDRATE 50 MG/ML
100 INJECTION, SOLUTION INTRAVENOUS PRN
Status: DISCONTINUED | OUTPATIENT
Start: 2021-01-13 | End: 2021-01-14 | Stop reason: HOSPADM

## 2021-01-13 RX ADMIN — RANOLAZINE 500 MG: 500 TABLET, FILM COATED, EXTENDED RELEASE ORAL at 22:45

## 2021-01-13 RX ADMIN — INSULIN GLARGINE 50 UNITS: 100 INJECTION, SOLUTION SUBCUTANEOUS at 22:00

## 2021-01-13 RX ADMIN — FLUTICASONE PROPIONATE 2 PUFF: 110 AEROSOL, METERED RESPIRATORY (INHALATION) at 19:42

## 2021-01-13 RX ADMIN — ENOXAPARIN SODIUM 40 MG: 100 INJECTION SUBCUTANEOUS at 09:00

## 2021-01-13 RX ADMIN — Medication 10 ML: at 22:54

## 2021-01-13 RX ADMIN — INSULIN LISPRO 6 UNITS: 100 INJECTION, SOLUTION INTRAVENOUS; SUBCUTANEOUS at 18:07

## 2021-01-13 RX ADMIN — Medication 3 MG: at 23:53

## 2021-01-13 RX ADMIN — ISOSORBIDE MONONITRATE 60 MG: 60 TABLET, EXTENDED RELEASE ORAL at 17:54

## 2021-01-13 RX ADMIN — PANTOPRAZOLE SODIUM 20 MG: 20 TABLET, DELAYED RELEASE ORAL at 17:54

## 2021-01-13 RX ADMIN — TICAGRELOR 90 MG: 90 TABLET ORAL at 22:44

## 2021-01-13 RX ADMIN — AMLODIPINE BESYLATE 10 MG: 10 TABLET ORAL at 17:53

## 2021-01-13 RX ADMIN — PREGABALIN 75 MG: 50 CAPSULE ORAL at 22:45

## 2021-01-13 RX ADMIN — Medication 10 ML: at 09:00

## 2021-01-13 RX ADMIN — ARIPIPRAZOLE 5 MG: 5 TABLET ORAL at 17:53

## 2021-01-13 RX ADMIN — ASPIRIN 81 MG: 81 TABLET, CHEWABLE ORAL at 17:54

## 2021-01-13 RX ADMIN — ATORVASTATIN CALCIUM 40 MG: 40 TABLET, FILM COATED ORAL at 22:45

## 2021-01-13 RX ADMIN — Medication 400 MG: at 17:54

## 2021-01-13 RX ADMIN — SERTRALINE 50 MG: 50 TABLET, FILM COATED ORAL at 17:54

## 2021-01-13 RX ADMIN — SODIUM CHLORIDE: 9 INJECTION, SOLUTION INTRAVENOUS at 02:45

## 2021-01-13 ASSESSMENT — ENCOUNTER SYMPTOMS
VOICE CHANGE: 0
ABDOMINAL DISTENTION: 0
FACIAL SWELLING: 0
CHEST TIGHTNESS: 0
ALLERGIC/IMMUNOLOGIC NEGATIVE: 1
SHORTNESS OF BREATH: 1
COUGH: 1
SHORTNESS OF BREATH: 0
NAUSEA: 0
VOMITING: 0
BLOOD IN STOOL: 0
GASTROINTESTINAL NEGATIVE: 1
COLOR CHANGE: 0
EYES NEGATIVE: 1
ANAL BLEEDING: 0
TROUBLE SWALLOWING: 0
WHEEZING: 0
APNEA: 0

## 2021-01-13 NOTE — CONSULTS
[facility] Nephrology  Consult Note           Reason for Consult:  ESRD management   Requesting Physician:  Dr. Kadi Crespo     Chief Complaint:  chest pain  History Obtained From:  patient, electronic medical record     History of Present Ilness:    56y.o. year old female with history s/f ESRd on IHD TTS, CAD s/p DEWEY, CHF, T2DM, GERD, asthma and schizophrenia who presented for SOB which started at dialysis. Had initially c/o about chest pain earlier that day. She was told that O2 sat was 88% on RA. Note that patient was recently diagnosed w/ covid ~10 days ago (01/03), remains positive. Labs are stable. CXR showed no acute abnormalities    Past Medical History:        Diagnosis Date    Anxiety     CAD S/P percutaneous coronary angioplasty 2015, 2018    stents per dr Gwen Galloway CHF (congestive heart failure) ([address]     CKD (chronic kidney disease) stage 4, GFR 15-29 ml/min ([address] 2/24/2018    CKD stage 4 due to type 2 diabetes mellitus ([address]     COPD (chronic obstructive pulmonary disease) ([address]     Diabetic nephropathy with proteinuria ([address] 2014    DJD (degenerative joint disease) of knee     Dr Staci Pro GERD (gastroesophageal reflux disease)     Hemiparesis, left ([address] 2013    entered Assisted Living ([facility])    Hemodialysis patient [facility])     History of heart failure     History of seizures     History of type C viral hepatitis     HTN (hypertension)     Hyperlipidemia     Impaired mobility and activities of daily living     Mediastinal lymphadenopathy 2013    Juany Francois    Metabolic syndrome     Moderate persistent asthma without complication 0/99/2655    Neurogenic urinary incontinence 2013    Neuropathy in diabetes ([address]     Obesity (BMI 30-39. 9)     Recurrent UTI     S/P colonoscopy 2014    [facility], focal active colitis    Schizophrenia, paranoid, chronic ([address]     [facility] vessel disease, cerebrovascular 2013    Status post total knee replacement, right     Status post total left knee replacement 2018   Imer Stanley 2020    Traumatic am[facility] of third toe of right foot [facility]     Type 2 diabetes mellitus with renal manifestations, controlled ([facility]     Insulin dependent, Dr Garcia Urinary incontinence due to cognitive impairment     Vitamin D deficiency        Past Surgical History:        Procedure Laterality Date     SECTION      x1    COLONOSCOPY  2014    Dr. Anna Ward      x1 Dr. Malathi Trotter, Dr Deanne Rubio CATH LAB PROCEDURE  10/02/2019    HYSTERECTOMY, TOTAL ABDOMINAL      one ovary intact, Dr Lc Dia, menorrhagia    [place] TOTAL KNEE ARTHROPLASTY Left 2018    LEFT KNEE TOTAL KNEE ARTHROPLASTY, SHAYNA, NERVE BLOCK performed by Ever Layton MD at [facility] Right     TOTAL KNEE ARTHROPLASTY  16    Dr Aramis Parkinson [address]UNNELED [address] Right 2020    tunneled HD catheter per Dr Bijan Woody Medications:    No current facility-administered medications on file prior to encounter.       Current Outpatient Medications on File Prior to Encounter   Medication Sig Dispense Refill    insulin aspart (NOVOLOG FLEXPEN) 100 UNIT/ML injection pen INJECT 12 units with each meals 15 mL 10    ranolazine (RANEXA) 500 MG extended release tablet Take 1 tablet by mouth 2 times daily 180 tablet 2    pregabalin (LYRICA) 75 MG capsule TAKE ONE (1) CAPSULE BY MOUTH TWICE DAILY 60 capsule 2    ARIPiprazole (ABILIFY) 5 MG tablet TAKE 1 TABLET BY MOUTH DAILY 30 tablet 2    BRILINTA 90 MG TABS tablet TAKE 1 TABLET BY MOUTH 2 TIMES DAILY 60 tablet 11    pantoprazole (PROTONIX) 20 MG tablet TAKE 1 TABLET BY MOUTH DAILY 90 tablet 3    vitamin B-12 (CYANOCOBALAMIN) 100 MCG tablet TAKE 1 TABLET BY MOUTH DAILY 30 tablet 10    nystatin (NYAMYC) 018796 UNIT/GM powder APPLY TO ABDOMINAL FOLDS EVERY 12 HOURS AS NEEDED 60 g 2 daughter's house and has adjusted well    One son and one daughter, live in the same house with patient, Sumeet Cary pays the rent    Hobbies reading ([employer])       Family History:   Family History   Problem Relation Age of Onset    Cancer Mother 76        survived   Rebekah Horn Hypertension Father     Diabetes Sister     Mental Illness Sister        Review of Systems:   Positives in bold  Constitutional: fever, chills, fatigue, malaise   HENT:  rhinorrhea, sinus pain, sore throat, epistaxis    Eyes:  photophobia, visual disturbance, eye redness  Respiratory: shortness of breath, cough, hemoptysis    Cardiovascular: chest pain, palpitations, orthopnea, leg swelling   Gastrointestinal: abdominal pain, nausea, vomiting, diarrhea, constipation   Endocrine: polydipsia, polyphagia, cold intolerance, heat intolerance  Genitourinary: dysuria, flank pain, frequency, urgency   Hematologic: easy bruising, easy bleeding  Musculoskeletal: back pain, neck pain, myalgias, arthalgias  Neurological: syncope, lightheadedness, dizziness, seizures, tremors, weakness  Psychiatric/Behavioral: anxiety, depression, hallucinations  Skin: rash, itching    Physical exam:   Constitutional:  VITALS:  /83   Pulse 77   Temp 98.2 °F (36.8 °C) (Oral)   Resp 20   Ht 5' 7\" (1.702 m)   Wt 207 lb (93.9 kg)   LMP  (LMP Unknown)   SpO2 97%   BMI 32.42 kg/m²     General: alert, in no apparent distress  HEENT: normocephalic, atraumatic, anicteric  Neck: supple, no mass  Lungs: non-labored respirations, clear to auscultation bilaterally  Heart: regular rate and rhythm, no murmurs or rubs  Abdomen: soft, non-tender, non-distended  MSK: no joint swelling or tenderness  Ext: no cyanosis, no peripheral edema  Neuro: alert and oriented, no gross abnormalities  Psych: normal mood and affect    Data/  CBC:   Lab Results   Component Value Date    WBC 7.9 01/12/2021    RBC 3.25 01/12/2021    HGB 10.1 01/12/2021    HCT 28.7 01/12/2021    MCV 88.4 01/12/2021 MCH 31.1 01/12/2021    MCHC 35.2 01/12/2021    RDW 15.7 01/12/2021     01/12/2021    MPV 10.0 03/05/2020     BMP:    Lab Results   Component Value Date     01/13/2021    K 3.5 01/13/2021    K 3.8 12/28/2020    CL 93 01/13/2021    CO2 27 01/13/2021    BUN 18 01/13/2021    LABALBU 4.4 01/12/2021    CREATININE 2.77 01/13/2021    CALCIUM 9.0 01/13/2021    GFRAA 20.9 01/13/2021    LABGLOM 17.3 01/13/2021    GLUCOSE 221 01/13/2021    GLUCOSE 102 10/16/2020     Ct Head Wo Contrast    Result Date: 1/3/2021  CT HEAD WO CONTRAST CLINICAL HISTORY:  dizziness Comparison: None TECHNIQUE: Multiple unenhanced serial axial images of the brain from the vertex of the skull to the base of the skull were performed. FINDINGS: The ventricles are dilated. This is compensatory to the surrounding  generalized parenchymal volume loss. No mass. No midline shift. The cisterns are patent. No acute intra-axial or extra-axial findings The visualized osseous structures are unremarkable. There is patchy opacification of the anterior posterior ethmoids. There is mucoperiosteal thickening with air-fluid level in the maxillary sinus. There is bilateral almost complete opacification of the maxillary sinuses. The mastoids are well aerated. Sheela Gunnison IMPRESSION NO ACUTE INTRA-AXIAL OR EXTRA-AXIAL FINDINGS. CHANGES IN PARANASAL SINUSES DESCRIBED ABOVE. CORRELATE CLINICALLY All CT scans at this facility use dose modulation, iterative reconstruction, and/or weight based dosing when appropriate to reduce radiation dose to as low as reasonably achievable. Nm Lung Scan Perfusion Only    Result Date: 12/27/2020  Nuclear medicine perfusion study. HISTORY: Elevated d-dimer. FINDINGS: Perfusion scintigraphy performed utilizing 5.6 mCi technetium MAA. No ventilation scan performed. Study done in comparison with chest radiograph, December 25, 2020, at 2326 hours.  Imaging obtained in anterior, posterior, right and left lateral, and right  and left decubitus positions. Subsegmental perfusion defect identified corresponding to small effusion and airspace disease right lung base. Low probability, pulmonary embolism. Xr Chest Portable    Result Date: 1/12/2021  XR CHEST PORTABLE Clinical History:     chest pain . Comparison:  12/25/2020  RESULT: No consolidation. Probable bibasilar scarring/atelectasis, unchanged. No pleural effusion. No pneumothorax. Normal pulmonary vascular pattern. Stable mildly enlarged cardiomediastinal silhouette. No acute osseous findings. Degenerative changes. No acute radiographic abnormality or significant interval change. Xr Chest Portable    Result Date: 12/26/2020  COMPARISON: 6/14/20; 6/28/20 HISTORY: hyperglycemia TECHNIQUE: AP view FINDINGS: Ill defined Opacity right upper lobe. The cardiac silhouette is enlarged. . The bony structures are grossly intact. Recommend PA and Lateral to assess opacity RUL. Could be overlapping ribs. Assessment:  56y.o. year old female with history s/f ESRd on IHD TTS, CAD s/p DEWEY, CHF, T2DM, GERD, asthma and schizophrenia who presented for persistent hyperglycemia and lethargy.     1. ESRD on IHD TTS, [facility] of Dr[facility] Marcella [facility] at [facility]   2. Covid infection: initially diagnosed 01/03  3. HTN  4. Renal anemia  5. Secondary renal hyperparathyroidism: on sensipar      Plan:  - will resume HD TTS starting tomorrow      Thank you for the consultation. Will continue to follow  Please do not hesitate to call with questions.     Irving Awan MD

## 2021-01-13 NOTE — CONSULTS
Consult Note  Patient: Linda Ennis  Unit/Bed: E542/N484-35  YOB: 1960  MRN: [id number]  Acct: [de-identified]   Admitting Diagnosis: Chest pain [R07.9]  Date:  1/12/2021  Hospital Day: 0      Chief Complaint:  Covid positive, end-stage renal disease and chest pain    History of Present Illness:  78-year-old female with history of end-stage renal disease on HD TTS, CAD status post drug-eluting stent to the mid LAD CHF type 2 diabetes GERD asthma schizophrenia was seen on [place] for similar type of problem. He complained of shortness of breath started on dialysis. Also has some chest pain. O2 on room air was 88%. 10 days ago Covid was positive.   We were consulted for cardiology chest pain  Allergies   Allergen Reactions    Codeine Hives     hives    Oxycontin [Oxycodone Hcl] Hives       Current Facility-Administered Medications   Medication Dose Route Frequency Provider Last Rate Last Admin    sodium chloride flush 0.9 % injection 10 mL  10 mL Intravenous 2 times per day Claudine Obrien MD   10 mL at 01/13/21 0900    sodium chloride flush 0.9 % injection 10 mL  10 mL Intravenous PRN Claudine Obrien MD        enoxaparin (LOVENOX) injection 40 mg  40 mg Subcutaneous Daily Claudine Obrien MD   40 mg at 01/13/21 0900    acetaminophen (TYLENOL) tablet 650 mg  650 mg Oral Q4H PRN Claudine Obrien MD        0.9 % sodium chloride infusion   Intravenous Continuous Claudine Obrien MD 75 mL/hr at 01/13/21 0245 New Bag at 01/13/21 0245    ondansetron (ZOFRAN) injection 4 mg  4 mg Intravenous Q8H PRN Claudine Obrien MD           PMHx:  Past Medical History:   Diagnosis Date    Anxiety     CAD S/P percutaneous coronary angioplasty 2015, 2018    stents per dr Yazmin Davidson    [facility]est[facility]) [facility]     CKD (chronic kidney disease) stage 4, GFR 15-29 ml/min ([facility] 2/24/2018    CKD stage 4 due to type 2 diabetes mellitus ([facility]     [facility] obstructive [facility]     Diabetic nephropathy with proteinuria [facility]     DJD (degenerative joint disease) of knee     Dr Claudia Brewer GERD (gastroesophageal reflux disease)     Hemiparesis, left ([facility]     [facility]    Hemodialysis patient [facility])     History of heart failure     History of seizures     History of type C viral hepatitis     HTN (hypertension)     Hyperlipidemia     Impaired mobility and activities of daily living     Mediastinal lymphadenopathy     Dr Ness Garrett, [facility]    Metabolic syndrome     Moderate persistent asthma without complication 5766    Neurogenic urinary incontinence 2013    Neuropathy in diabetes ([facility]     Obesity (BMI 30-39. 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 Riley Wihttaker      x1 Dr. Mervat Bryant, Dr Amber Blanton CATH LAB PROCEDURE  10/02/2019    HYSTERECTOMY, TOTAL ABDOMINAL      one ovary intact, Dr Diana Thomson, menorrhagia    DE TOTAL KNEE ARTHROPLASTY Left 2018    LEFT KNEE TOTAL KNEE ARTHROPLASTY, SHAYNA, NERVE BLOCK performed by Niels Roberts MD at [address] [place] [address] Right     TOTAL KNEE ARTHROPLASTY  16    Dr Claudia Brewer TUNNELED [address] Right 2020    tunneled HD catheter per Dr Papito Finley Social Hx:  Social History     Socioeconomic History    Marital status:      Spouse name: None    Number of children: 2    Years of education: None    Highest education level: None   Occupational History    Occupation: disabled   Social Needs    Financial resource strain: None    Food insecurity     Worry: None     Inability: None    Transportation needs     Medical: None     Non-medical: None   Tobacco Use    Smoking status: Passive Smoke Exposure - Never Smoker    Smokeless tobacco: Never Used   Substance and Sexual Activity    Alcohol use: No     Alcohol/week: 0.0 standard drinks    Drug use: No    Sexual activity: Not Currently   Lifestyle    Physical activity     Days per week: None     Minutes per session: None    Stress: None   Relationships    Social connections     Talks on phone: None     Gets together: None     Attends Latter day service: None     Active member of club or organization: None     Attends meetings of clubs or organizations: None     Relationship status: None    Intimate partner violence     Fear of current or ex partner: None     Emotionally abused: None     Physically abused: None     Forced sexual activity: None   Other Topics Concern    None   Social History Narrative    Born in [place], one of 5    Twin sister Reyes, very ill in 2018, [place] [id number]    Moved to [facility], , 2 children, one son and one daughter    Worked at [employer], as a nurse's aide    Disabled due to mental illness    Lived at [facility], was discharged, returned to independent living in 2017 in the daughter's house and has adjusted well    One son and one daughter, live in the same house with patient, Krystyna Duarte pays the rent    [employer] reading ([place])       Family Hx:  Family History   Problem Relation Age of Onset    Cancer Mother 76        survived   [facility] Hypertension Father     Diabetes Sister     Mental Illness Sister        Review of Systems:   Review of Systems   Constitutional: Negative for activity change, appetite change, diaphoresis, fatigue and unexpected weight change. HENT: Negative for facial swelling, nosebleeds, trouble swallowing and voice change. Respiratory: Negative for apnea, chest tightness, shortness of breath and wheezing. Cardiovascular: Negative for chest pain, palpitations and leg swelling. Gastrointestinal: Negative for abdominal distention, anal bleeding, blood in stool, nausea and vomiting. Genitourinary: Negative for decreased urine volume and dysuria. Musculoskeletal: Negative for gait problem and myalgias. Skin: Negative for color change, pallor, rash and wound. Neurological: Negative for dizziness, syncope, facial asymmetry, weakness, light-headedness, numbness and headaches. Hematological: Does not bruise/bleed easily. Psychiatric/Behavioral: Negative for agitation, behavioral problems, confusion, hallucinations and suicidal ideas. The patient is not nervous/anxious. All other systems reviewed and are negative.         Physical Examination:    /83   Pulse 77   Temp 98.2 °F (36.8 °C) (Oral)   Resp 20   Ht 5' 7\" (1.702 m)   Wt 207 lb (93.9 kg)   LMP  (LMP Unknown)   SpO2 97%   BMI 32.42 kg/m²    Physical Exam  Not done because of Covid status and to minimize the risk of spread  LABS:  CBC:  Lab Results   Component Value Date    WBC 7.9 01/12/2021    RBC 3.25 01/12/2021    HGB 10.1 01/12/2021    HCT 28.7 01/12/2021    MCV 88.4 01/12/2021    MCH 31.1 01/12/2021    MCHC 35.2 01/12/2021    RDW 15.7 01/12/2021     01/12/2021    MPV 10.0 03/05/2020     CBC with Differential:   Lab Results   Component Value Date    WBC 7.9 01/12/2021    RBC 3.25 01/12/2021    HGB 10.1 01/12/2021    HCT 28.7 01/12/2021     01/12/2021    MCV 88.4 01/12/2021    MCH 31.1 01/12/2021    MCHC 35.2 01/12/2021    RDW 15.7 01/12/2021    NRBC 0.0 10/16/2020    BANDSPCT 5 06/14/2013    LYMPHOPCT 18.3 01/12/2021    [facility]    EOSPCT 3.5 03/05/2020    BASOPCT 0.5 01/12/2021    MONOSABS 0.6 01/12/2021    LYMPHSABS 1.4 01/12/2021    EOSABS 0.1 01/12/2021    BASOSABS 0.0 01/12/2021     CMP:    Lab Results   Component Value Date     01/13/2021    K 3.5 01/13/2021    K 3.8 12/28/2020    CL 93 01/13/2021    CO2 27 01/13/2021    BUN 18 01/13/2021    CREATININE 2.77 01/13/2021    GFRAA 20.9 01/13/2021    LABGLOM 17.3 01/13/2021    GLUCOSE 221 01/13/2021    GLUCOSE 102 10/16/2020    PROT 8.0 01/12/2021    LABALBU 4.4 01/12/2021    CALCIUM 9.0 01/13/2021    BILITOT 1.0 01/12/2021    ALKPHOS 149 01/12/2021    AST 30 01/12/2021    ALT 15 01/12/2021     BMP:    Lab Results   Component Value Date     01/13/2021    K 3.5 01/13/2021    K 3.8 12/28/2020    CL 93 01/13/2021    CO2 27 01/13/2021    BUN 18 01/13/2021    LABALBU 4.4 01/12/2021    CREATININE 2.77 01/13/2021    CALCIUM 9.0 01/13/2021    GFRAA 20.9 01/13/2021    LABGLOM 17.3 01/13/2021    GLUCOSE 221 01/13/2021    GLUCOSE 102 10/16/2020     Magnesium:    Lab Results   Component Value Date    MG 2.0 01/12/2021     Troponin:    Lab Results   Component Value Date    TROPONINI <0.010 01/13/2021       Radiology:  Xr Chest Portable    Result Date: 1/12/2021  XR CHEST PORTABLE Clinical History:     chest pain . Comparison:  12/25/2020  RESULT: No consolidation. Probable bibasilar scarring/atelectasis, unchanged. No pleural effusion. No pneumothorax. Normal pulmonary vascular pattern. Stable mildly enlarged cardiomediastinal silhouette. No acute osseous findings. Degenerative changes. No acute radiographic abnormality or significant interval change. EKG:  Assessment:    Active Hospital Problems    Diagnosis Date Noted    Chest pain [R07.9] 05/24/2020     1. Covid positive  2. Chest pain  3. End-stage renal disease on hemodialysis  4. Angioplasty of the mid LAD with drug-eluting stent       Plan:Patient not needing to be admitted to Cardiology. Multiple comobidities. Will talk to Dr Rani Schaffer. Needs primary care admit  Will resume plavix statin ASA  Electronically signed by Danii Adamson MD on 1/13/2021 at 3:16 PM

## 2021-01-14 VITALS
SYSTOLIC BLOOD PRESSURE: 136 MMHG | HEART RATE: 86 BPM | RESPIRATION RATE: 20 BRPM | DIASTOLIC BLOOD PRESSURE: 53 MMHG | HEIGHT: 67 IN | BODY MASS INDEX: 32.49 KG/M2 | WEIGHT: 207 LBS | TEMPERATURE: 97.9 F | OXYGEN SATURATION: 94 %

## 2021-01-14 LAB
GLUCOSE BLD-MCNC: 177 MG/DL (ref 60–115)
GLUCOSE BLD-MCNC: 348 MG/DL (ref 60–115)
PERFORMED ON: ABNORMAL
PERFORMED ON: ABNORMAL

## 2021-01-14 PROCEDURE — 93010 ELECTROCARDIOGRAM REPORT: CPT | Performed by: INTERNAL MEDICINE

## 2021-01-14 PROCEDURE — 6370000000 HC RX 637 (ALT 250 FOR IP): Performed by: PHYSICIAN ASSISTANT

## 2021-01-14 PROCEDURE — 94640 AIRWAY INHALATION TREATMENT: CPT

## 2021-01-14 PROCEDURE — 94761 N-INVAS EAR/PLS OXIMETRY MLT: CPT

## 2021-01-14 PROCEDURE — 2580000003 HC RX 258: Performed by: INTERNAL MEDICINE

## 2021-01-14 PROCEDURE — 99231 SBSQ HOSP IP/OBS SF/LOW 25: CPT | Performed by: INTERNAL MEDICINE

## 2021-01-14 PROCEDURE — 6360000002 HC RX W HCPCS: Performed by: INTERNAL MEDICINE

## 2021-01-14 RX ORDER — INSULIN ASPART 100 [IU]/ML
INJECTION, SOLUTION INTRAVENOUS; SUBCUTANEOUS
Qty: 15 ML | Refills: 10 | Status: SHIPPED | OUTPATIENT
Start: 2021-01-14 | End: 2021-03-12 | Stop reason: SDUPTHER

## 2021-01-14 RX ORDER — SODIUM CHLORIDE 9 MG/ML
INJECTION, SOLUTION INTRAVENOUS
Status: DISCONTINUED
Start: 2021-01-14 | End: 2021-01-14 | Stop reason: HOSPADM

## 2021-01-14 RX ORDER — INSULIN GLARGINE 100 [IU]/ML
45 INJECTION, SOLUTION SUBCUTANEOUS NIGHTLY
Qty: 15 ML | Refills: 10 | COMMUNITY
Start: 2021-01-14 | End: 2021-03-12 | Stop reason: SDUPTHER

## 2021-01-14 RX ADMIN — TICAGRELOR 90 MG: 90 TABLET ORAL at 13:50

## 2021-01-14 RX ADMIN — PANTOPRAZOLE SODIUM 20 MG: 20 TABLET, DELAYED RELEASE ORAL at 13:49

## 2021-01-14 RX ADMIN — FLUTICASONE PROPIONATE 2 PUFF: 110 AEROSOL, METERED RESPIRATORY (INHALATION) at 08:42

## 2021-01-14 RX ADMIN — ENOXAPARIN SODIUM 40 MG: 100 INJECTION SUBCUTANEOUS at 13:51

## 2021-01-14 RX ADMIN — INSULIN LISPRO 2 UNITS: 100 INJECTION, SOLUTION INTRAVENOUS; SUBCUTANEOUS at 13:49

## 2021-01-14 RX ADMIN — RANOLAZINE 500 MG: 500 TABLET, FILM COATED, EXTENDED RELEASE ORAL at 13:50

## 2021-01-14 RX ADMIN — SERTRALINE 50 MG: 50 TABLET, FILM COATED ORAL at 13:51

## 2021-01-14 RX ADMIN — Medication 400 MG: at 13:50

## 2021-01-14 RX ADMIN — ARIPIPRAZOLE 5 MG: 5 TABLET ORAL at 13:51

## 2021-01-14 RX ADMIN — PREGABALIN 75 MG: 50 CAPSULE ORAL at 13:49

## 2021-01-14 RX ADMIN — AMLODIPINE BESYLATE 10 MG: 10 TABLET ORAL at 13:49

## 2021-01-14 RX ADMIN — ISOSORBIDE MONONITRATE 60 MG: 60 TABLET, EXTENDED RELEASE ORAL at 13:50

## 2021-01-14 RX ADMIN — ASPIRIN 81 MG: 81 TABLET, CHEWABLE ORAL at 13:49

## 2021-01-14 RX ADMIN — Medication 10 ML: at 13:50

## 2021-01-14 ASSESSMENT — PAIN SCALES - GENERAL: PAINLEVEL_OUTOF10: 0

## 2021-01-14 NOTE — CARE COORDINATION
CALL PLACED TO [facility] TO NOTIFY OF NEED FOR RESUMPTION OF [address] [facility]. ORDER FAXED. ALSO CALLED TO VERIFY HD SCHEDULE FOR THE PATIENT. PLEASE SEE THE WILIAN. I PLACED SCHEDULE AND LOCATION FOR THE HD ON THERE. I CONFIRMED THAT THE PATIENT DOES NOT NEED TO GO TO A [facility] AS SHE HAS COMPLETED HER 21 DAYS. THEY ARE AWARE OF THE HER DISCHARGE BACK HOME TODAY. WILL UPDATE NURSING ON D/C PLAN. Emotional support provided.

## 2021-01-14 NOTE — PROGRESS NOTES
Progress Note  Patient: Jose Galicia  Unit/Bed: K467/A046-62  YOB: 1960  MRN: [id number]  Acct: [de-identified]   Admitting Diagnosis: Chest pain [R07.9]  Date:  1/12/2021  Hospital Day: 1    Chief Complaint:  Covid positive. Subjective  Has some chest pain which appears to be noncardiac.  1 troponin was negative. Because of the atypical nature of chest pain I canceled the other troponins. Discussed with nurse. Patient doing well today. Had dialysis. No complaints of chest pain. Review of Systems:   Review of Systems    Pt not personally examined    Physical Examination:    BP (!) 136/53   Pulse 86   Temp 97.9 °F (36.6 °C) (Oral)   Resp 20   Ht 5' 7\" (1.702 m)   Wt 207 lb (93.9 kg)   LMP  (LMP Unknown)   SpO2 94%   BMI 32.42 kg/m²    Physical Exam    Deferred due to COVID-19 status and to minimize exposure/risk of spread.     LABS:  CBC:   Lab Results   Component Value Date    WBC 7.9 01/12/2021    RBC 3.25 01/12/2021    HGB 10.1 01/12/2021    HCT 28.7 01/12/2021    MCV 88.4 01/12/2021    MCH 31.1 01/12/2021    MCHC 35.2 01/12/2021    RDW 15.7 01/12/2021     01/12/2021    MPV 10.0 03/05/2020     CBC with Differential:   Lab Results   Component Value Date    WBC 7.9 01/12/2021    RBC 3.25 01/12/2021    HGB 10.1 01/12/2021    HCT 28.7 01/12/2021     01/12/2021    MCV 88.4 01/12/2021    MCH 31.1 01/12/2021    MCHC 35.2 01/12/2021    RDW 15.7 01/12/2021    NRBC 0.0 10/16/2020    BANDSPCT 5 06/14/2013    LYMPHOPCT 18.3 01/12/2021    MONOPCT 8.2 01/12/2021    EOSPCT 3.5 03/05/2020    BASOPCT 0.5 01/12/2021    MONOSABS 0.6 01/12/2021    LYMPHSABS 1.4 01/12/2021    EOSABS 0.1 01/12/2021    BASOSABS 0.0 01/12/2021     CMP:    Lab Results   Component Value Date     01/13/2021    K 3.5 01/13/2021    K 3.8 12/28/2020    CL 93 01/13/2021    CO2 27 01/13/2021    BUN 18 01/13/2021    CREATININE 2.77 01/13/2021    GFRAA 20.9 01/13/2021    LABGLOM 17.3 01/13/2021    GLUCOSE 221 01/13/2021    GLUCOSE 102 10/16/2020    PROT 8.0 01/12/2021    LABALBU 4.4 01/12/2021    CALCIUM 9.0 01/13/2021    BILITOT 1.0 01/12/2021    ALKPHOS 149 01/12/2021    AST 30 01/12/2021    ALT 15 01/12/2021     BMP:    Lab Results   Component Value Date     01/13/2021    K 3.5 01/13/2021    K 3.8 12/28/2020    CL 93 01/13/2021    CO2 27 01/13/2021    BUN 18 01/13/2021    LABALBU 4.4 01/12/2021    CREATININE 2.77 01/13/2021    CALCIUM 9.0 01/13/2021    GFRAA 20.9 01/13/2021    LABGLOM 17.3 01/13/2021    GLUCOSE 221 01/13/2021    GLUCOSE 102 10/16/2020     Magnesium:    Lab Results   Component Value Date    MG 2.0 01/12/2021     Troponin:    Lab Results   Component Value Date    TROPONINI <0.010 01/13/2021       Radiology:  Xr Chest Portable    Result Date: 1/12/2021  XR CHEST PORTABLE Clinical History:     chest pain . Comparison:  12/25/2020  RESULT: No consolidation. Probable bibasilar scarring/atelectasis, unchanged. No pleural effusion. No pneumothorax. Normal pulmonary vascular pattern. Stable mildly enlarged cardiomediastinal silhouette. No acute osseous findings. Degenerative changes. No acute radiographic abnormality or significant interval change. EKG:  Assessment:    Active Hospital Problems    Diagnosis Date Noted    Chest pain [R07.9] 05/24/2020         Covid positive          Mid LAD stent     Plan:  Talked to [facility]. Okay to discharge from cardiac standpoint.   Follow-up with S[facility] as outpatient    Electronically signed by Meghan Cantu MD on 1/14/2021 at 3:02 PM

## 2021-01-14 NOTE — CARE COORDINATION
[facility] Case Management Initial Discharge Assessment    Met with Patient to discuss discharge plan. PCP: Vik Herrera MD                                Date of Last Visit: 1/4 HAD VIRTUAL VISIT    If no PCP, list provided? N/A    Discharge Planning    Living Arrangements: at home dependent on family care    Who do you live with? dtr    Who helps you with your care:  self or family    If lives at home:     Do you have any barriers navigating in your home? no    Patient can perform ADL? Yes    Current Services (outpatient and in home) :  [address] [facility] ([address])    Dialysis: Yes, Location [facility] , Chair Time 1 PM    Is transportation available to get to your appointments? Yes    DME Equipment:  no    Respiratory equipment: None    Respiratory provider:  no     Pharmacy:  yes - [address] with Medication Assistance Program?  No      Patient agreeable to [facility]? Yes, [address] [facility] [address] [facility]    Patient agreeable to SNF/Rehab? No    Other discharge needs identified? N/A    Freedom of choice list provided with basic dialogue that supports the patient's individualized plan of care/goals and shares the quality data associated with the providers. Yes    Does Patient Have a High-Risk for Readmission Diagnosis (CHF, PN, MI, COPD)? No    The plan for Transition of Care is related to the following treatment [address]    Initial Discharge Plan? (Note: please see concurrent daily documentation for any updates after initial note).     D/C HOME W/ EJQ HHC AND RETURN TO [facility] HD T-R-SAT AT 1PM.     The Patient and/or patient representative: Juvencio Morrison was provided with choice of any post-acute providers for care and equipment and agrees with discharge plan  Yes    Electronically signed by Aurora Forrester RN on 1/14/2021 at 2:47 PM

## 2021-01-14 NOTE — DISCHARGE INSTR - COC
Continuity of Care Form    Patient Name: Claudeen Nation   :  1960  MRN:  [id number]    Admit date:  2021  Discharge date:  2021    Code Status Order: Full Code   Advance Directives:     Admitting Physician:   Sakina Mckenna MD  PCP: Jackson Kilgore MD    Discharging Nurse: Eddi [facility] Unit/Room#: E452/T073-87  Discharging Unit Phone Number: [phone]    Emergency Contact:   Extended Emergency Contact Information  Primary Emergency Contact: [address] Phone: [phone]  Work Phone: [phone]  Mobile Phone: [phone]  Relation: Child    Past Surgical History:  Past Surgical History:   Procedure Laterality Date     SECTION      x1    COLONOSCOPY  2014    Dr. Efrain Lange      x1 Dr. Domenico Olmedo, Dr Wade Or CATH LAB PROCEDURE  10/02/2019    HYSTERECTOMY, TOTAL ABDOMINAL      one ovary intact, Dr Mary Cerda, menorrhagia    [facility] Left 2018    LEFT KNEE TOTAL KNEE ARTHROPLASTY, SHAYNA, NERVE BLOCK performed by Rob Koehler MD at [address] [place] [address] Right     TOTAL KNEE ARTHROPLASTY  16    Dr Trudy Blandon TUNNELED [address] Right 2020    tunneled HD catheter per Dr Nas Everett       Immunization History:   Immunization History   Administered Date(s) Administered    Influenza Vaccine, unspecified formulation 10/14/2016    Influenza Virus Vaccine 10/20/2014, 10/30/2015, 10/14/2016, 2017, 10/12/2018    Influenza Whole 10/20/2014    Influenza, Quadv, IM, (6 mo and older Fluzone, Flulaval, Fluarix and 3 yrs and older Afluria) 2017, 10/12/2018    Influenza, Quadv, IM, PF (6 mo and older Fluzone, Flulaval, Fluarix, and 3 yrs and older Afluria) 10/03/2019    Influenza, Triv, 3 Years and older, IM (Afluria (5 yrs and older) 10/14/2016    Pneumococcal Polysaccharide ([id number]) 10/15/2016    Tdap (Boostrix, Adacel) 08/03/2020       Active Problems:  Patient Active Problem List   Diagnosis Code    Coronary artery disease involving native heart with angina pectoris (HCC) I25.119    Schizophrenia, paranoid, chronic O72.5    Metabolic syndrome G08.12    Vitamin B 12 deficiency E53.8    Cerebral microvascular disease I67.89    Mixed hyperlipidemia E78.2    Other hammer toe (acquired) M20.40    Vitamin D insufficiency E55.9    Incontinence of urine R32    Diabetic nephropathy with proteinuria ([address] E11.21    Essential hypertension I10    History of type C viral hepatitis Z86.19    Urinary incontinence due to cognitive impairment R39.81    History of seizures Z87.898    Stented coronary artery-plan is to stay on Plavix indefinately per Dr Citlaly Cristobal Z95.5    Hemiparesis, left ([address] G81.94    Angina, class II ([address] I20.9    Chronic painful diabetic neuropathy ([address] E11.40    Tardive dyskinesia G24.01    Shortness of breath R06.02    Uncontrolled type 2 diabetes mellitus with hyperglycemia ([facility]) I82.43    Diastolic congestive heart failure (HCC) I50.30    Sleep apnea G47.30    Pulmonary hypertension ([facility]) I27.20    Class 2 severe obesity with serious comorbidity and body mass index (BMI) of 36.0 to 36.9 in adult ([facility]) E66.01, Z68.36    Edema R60.9    Closed supracondylar fracture of right humerus S42.411A    Other chronic pain G89.29    Palliative care patient Z51.5    Chest pain R07.9    Recurrent falls R29.6    Renal failure N19    Difficulty in walking R26.2    ESRD (end stage renal disease) on dialysis ([address] N18.6, Z99.2    Weakness R53.1    Moderate persistent asthma without complication R27.97    Thrush B37.0    COVID-19 U07.1       Isolation/Infection:   Isolation          Droplet Plus        Patient Infection Status     Infection Onset Added Last Indicated Last Indicated By Review Planned Expiration Resolved Resolved By    COVID-19  01/13/21 01/13/21 Tita El RN 01/20/21 01/27/21      Positive 1/12/21. Electronically signed by Tita El RN on 1/13/21 at 7:22 AM EST       Resolved    COVID-19 12/25/20 12/25/20 01/12/21 COVID-19   01/13/21 Tita El RN    COVID-19 Rule Out 12/25/20 12/25/20 12/25/20 COVID-19 (Ordered)   12/25/20 Rule-Out Test Resulted    COVID-19 Rule Out 07/02/20 07/02/20 07/02/20 COVID-19 (Ordered)   07/02/20 Rule-Out Test Resulted    COVID-19 Rule Out 06/29/20 06/29/20 06/29/20 COVID-19 (Ordered)   06/29/20 Rule-Out Test Resulted    COVID-19 Rule Out 06/28/20 06/28/20 06/28/20 COVID-19 (Ordered)   06/28/20 Rule-Out Test Resulted    COVID-19 Rule Out 06/14/20 06/14/20 06/14/20 COVID-19 (Ordered)   06/14/20 Rule-Out Test Resulted          Nurse Assessment:  Last Vital Signs: /60   Pulse 75   Temp 97.1 °F (36.2 °C)   Resp 14   Ht 5' 7\" (1.702 m)   Wt 207 lb (93.9 kg)   LMP  (LMP Unknown)   SpO2 98%   BMI 32.42 kg/m²     Last documented pain score (0-10 scale): Pain Level: 0  Last Weight:   Wt Readings from Last 1 Encounters:   01/12/21 207 lb (93.9 kg)     Mental Status:  oriented, alert, logical, thought processes intact and able to concentrate and follow conversation    IV Access:  None    Nursing Mobility/ADLs:  Walking   Assisted  Transfer  Independent  Bathing  Assisted  Dressing  Independent  Toileting  Assisted  Feeding  Independent  Med Admin  Assisted  Med Delivery   whole    Wound Care Documentation and Therapy:  Wound 06/28/20 Arm Distal;Left;Lower red, open  (Active)   Number of days: 199        Elimination:  Continence:   · Bowel: Yes  · Bladder: Yes  Urinary Catheter: None   Colostomy/Ileostomy/Ileal Conduit: No       Date of Last BM: 01/13/2021  No intake or output data in the 24 hours ending 01/14/21 1212  I/O last 3 completed shifts:   In: 18 [P.O.:480]  Out: -     Safety Concerns:     None    Impairments/Disabilities:      None    Nutrition Therapy:  Current Nutrition Therapy:   - Oral Diet: General    Routes of Feeding: Oral  Liquids: No Restrictions  Daily Fluid Restriction: no  Last Modified Barium Swallow with Video (Video Swallowing Test): not done    Treatments at the Time of Hospital Discharge:   Respiratory Treatments:   Oxygen Therapy:  is not on home oxygen therapy. Ventilator:    - No ventilator support    Rehab Therapies: {THERAPEUTIC INTERVENTION:2141223650}  Weight Bearing Status/Restrictions: [address] [facility] Weight Bearin}  Other Medical Equipment (for information only, NOT a DME order):  {EQUIPMENT:059231573}  Other Treatments: ***    Patient's personal belongings (please select all that are sent with patient):  {CHP DME Belongings:360936844}    RN SIGNATURE:  {Esignature:606385423}    CASE MANAGEMENT/SOCIAL WORK SECTION    Inpatient Status Date: 2021    Readmission Risk Assessment Score:  Readmission Risk              Risk of Unplanned Readmission:        30           Discharging to Facility/ Agency   · Name: [address] [place] [address].    · [address] [facility] [address]  · Phone: [phone]   · Fax: [phone]      Dialysis Facility (if applicable)   · Name: [facility]  · Address: TO RESUME AT [address] [place] [address]  · [place] [address] 21 DAYS AT [address] [place], [place] [address]  · Dialysis Schedule: Tuesday, Thursday, AND Saturday AT 1 PM.   · Phone: [phone]      / signature: Electronically signed by Leonie Logan RN on 21 at 12:20 PM EST    PHYSICIAN SECTION    Prognosis: {Prognosis:8623344223}    Condition at Discharge: [address] Patient Condition:082096731}    Rehab Potential (if transferring to Rehab): {Prognosis:4196073342}    Recommended Labs or Other Treatments After Discharge: ***    Physician Certification: I certify the above information and transfer of Jhon Aguilar  is necessary for the continuing treatment of the diagnosis listed and that she requires {Admit to Appropriate Level of Care:02600} for {GREATER/LESS:360938842} 30 days.      Update Admission H&P: {CHP DME Changes in PIBOP:576676041}    PHYSICIAN SIGNATURE:  {Esignature:885429676}

## 2021-01-14 NOTE — CONSULTS
[address]                      [address], [address] [facility]                                  CONSULTATION    PATIENT NAME: Rivera Osei                  :        1960  MED REC NO:   [id number]                            ROOM:       Y212  ACCOUNT NO:   [de-identified]                           ADMIT DATE: 2021  PROVIDER:     Daria Shay MD    CONSULT DATE:  2021    ENDOCRINE CONSULT    REFERRING PROVIDER:  Maulik Llanes MD    REASON FOR CONSULT:  Management of uncontrolled diabetes. CHIEF COMPLAINT AND HISTORY OF PRESENT ILLNESS:  The patient is a  75-year-old female with known history of diabetes, end-stage renal  disease on hemodialysis admitted with chest pain. The patient was  admitted in the hospital recently a month ago for COVID-19 pneumonia. Blood sugars have been staying in the 200-280 range. Her last  hemoglobin A1c was 7.2 done today. Prior A1c has been between 6 to 7.7  range. The patient's insulin dose was adjusted here from Lantus 50  units at night, Humalog was added to 6 units with each meal plus Humalog  coverage. The patient is currently not on any steroids, Decadron at  this time. Initial admitting diagnosis was COVID-19 pneumonia,  end-stage renal disease, angina, history of angioplasty; p.o. intake has  been on the low side here. PAST MEDICAL HISTORY:  Type 2 diabetes, end-stage renal disease on  hemodialysis, history of schizophrenia, status post colonoscopy,  hemiparesis of left side, neurogenic urinary incontinence, coronary  artery disease, GERD, arthritis, congestive heart failure. PAST SURGICAL HISTORY:  Hysterectomy, toe amputation, angioplasty with  stent, colonoscopy, knee arthroscopic surgery. FAMILY HISTORY:  Cancer, hypertension, diabetes. PERSONAL AND SOCIAL HISTORY:  Passive smoker exposure. Denies any  alcohol or substance abuse.

## 2021-01-14 NOTE — DISCHARGE SUMMARY
[facility] Medicine Discharge Summary    Paola Talbert  :  1960  MRN:  [id number]    Admit date:  2021  Discharge date:  2021    Admitting Physician: Guerrero Barron MD  Primary Care Physician:  Barbra Lyle MD    Discharge Diagnoses:    Principal Problem:    Chest pain  Resolved Problems:    * No resolved hospital problems. *    Chief Complaint   Patient presents with    Chest Pain       Condition: improved  Activity: no strenuous activity   Diet: cardiac, renal  Disposition: home with existing home care  Functional Status: ambulatory    Significant Findings:     Trop <0.01    Hospital Course:   64-year-old female with history of ESRD, type 2 diabetes, CAD on dual antiplatelet, reactive airway disease, schizophrenia, recent COVID-19 infection (diagnosed 2020) presented with chest pain and dyspnea. She does not have any hypoxia and was able to ambulate. Her reactive airway disease did not appear to be in exacerbation. She had her insulin adjusted by endocrinology as reflected below. Her chest pain was evaluated by cardiology who felt she was stable to discharge home. She will resume her existing home care services. Given her history of cardiac disease and history of multiple presentations for chest pain, I added low-dose Lopressor which can be further titrated as outpatient.       To Do:  [ ] f/u with PCP    Exam on Discharge:   BP (!) 136/53   Pulse 86   Temp 97.9 °F (36.6 °C) (Oral)   Resp 20   Ht 5' 7\" (1.702 m)   Wt 207 lb (93.9 kg)   LMP  (LMP Unknown)   SpO2 94%   BMI 32.42 kg/m²   General: alert, in no apparent distress  HEENT: normocephalic, atraumatic, anicteric  Lungs: non-labored respirations, clear to auscultation bilaterally  Heart: regular rate and rhythm, no murmurs or rubs  Abdomen: soft, non-tender, non-distended  MSK: no joint swelling or tenderness  Ext: no cyanosis, no peripheral edema  Neuro: alert and oriented, no gross abnormalities    Discharge Medication List:   Dilcia De La Fuente   Home Medication Instructions XOQ:[id number]    Printed on:01/14/21 2429   Medication Information                      albuterol sulfate HFA (VENTOLIN HFA) 108 (90 Base) MCG/ACT inhaler  Inhale 2 puffs into the lungs every 6 hours as needed for Wheezing             amLODIPine (NORVASC) 10 MG tablet  TAKE 1 TABLET BY MOUTH DAILY             ARIPiprazole (ABILIFY) 5 MG tablet  TAKE 1 TABLET BY MOUTH DAILY             aspirin 81 MG tablet  Take 1 tablet by mouth daily             atorvastatin (LIPITOR) 40 MG tablet  Take 1 tablet by mouth daily             B Complex-C-Folic Acid (NEPHRO VITAMINS) 0.8 MG TABS  Take by mouth daily              Blood Glucose Monitoring Suppl (FREESTYLE LITE) CORETTA  1 Device by Does not apply route daily as needed (Diabetes) Use freestyle meter to test blood sugar as needed             BRILINTA 90 MG TABS tablet  TAKE 1 TABLET BY MOUTH 2 TIMES DAILY             fluticasone (FLOVENT HFA) 110 MCG/ACT inhaler  Inhale 2 puffs into the lungs 2 times daily             FREESTYLE LITE strip  TEST FOUR TIMES DAILY BEFORE MEALS AND NIGHTLY             insulin aspart (NOVOLOG FLEXPEN) 100 UNIT/ML injection pen  INJECT 6 units with each meals             ipratropium-albuterol (DUONEB) 0.5-2.5 (3) MG/3ML SOLN nebulizer solution  Inhale 3 mLs into the lungs every 4 hours as needed for Shortness of Breath             isosorbide mononitrate (IMDUR) 60 MG extended release tablet  Take 1 tablet by mouth daily             LANTUS SOLOSTAR 100 UNIT/ML injection pen  Inject 45 Units into the skin nightly             magnesium oxide (MAG-OX) 400 MG tablet  Take 400 mg by mouth daily             melatonin 3 MG TABS tablet  TAKE 1 TABLET BY MOUTH EVERY NIGHT AS NEEDED FOR INSOMNIA             metoprolol tartrate (LOPRESSOR) 25 MG tablet  Take 0.5 tablets by mouth 2 times daily             Misc.  Devices (BARIATRIC ROLLATOR) MISC  Rolllator with a basket             Misc. Devices [facility] MISC  To use with transport outside of the house.              nitroGLYCERIN (NITROSTAT) 0.4 MG SL tablet  Place 1 tablet under the tongue every 5 minutes as needed for Chest pain             nystatin (NYAMYC) 365933 UNIT/GM powder  APPLY TO ABDOMINAL FOLDS EVERY 12 HOURS AS NEEDED             pantoprazole (PROTONIX) 20 MG tablet  TAKE 1 TABLET BY MOUTH DAILY             pregabalin (LYRICA) 75 MG capsule  TAKE ONE (1) CAPSULE BY MOUTH TWICE DAILY             ranolazine (RANEXA) 500 MG extended release tablet  Take 1 tablet by mouth 2 times daily             sertraline (ZOLOFT) 50 MG tablet  TAKE 1 TABLET BY MOUTH DAILY             SURE COMFORT PEN NEEDLES 30G X 8 MM MISC  USE AS DIRECTED FIVE TIMES A DAY             triamcinolone (KENALOG) 0.1 % cream  APPLY TO AFFECTED AREA TWICE DAILY AS DIRECTED             vitamin B-12 (CYANOCOBALAMIN) 100 MCG tablet  TAKE 1 TABLET BY MOUTH DAILY                 DC time 37 minutes    Signed:  Latosha Malin  1/14/2021, 3:52 PM

## 2021-01-14 NOTE — H&P
Hospitalist History and Physical  Name: Bre Marcial  Age: 56 y.o. Gender: female  CodeStatus: Full Code  Allergies: Codeine  Oxycontin [Oxycodone Hcl]    Chief Complaint:Chest Pain    Primary Care Provider: Chuy Escalante MD  InpatientTreatment Team: Treatment Team: Attending Provider: Mildred Howell MD; Patient Care Tech: Birtha Person; Consulting Physician: Brooke Rosales DO; Consulting Physician: Missael Diego MD; Registered Nurse: Jose Barajas, FLAQUITO; Physician: Jerri Laura MD; Registered Nurse: Juanjose Wiseman RN  Admission Date: 1/12/2021      Subjective: Patient is a 77-year-old female with past medical history of end-stage renal disease on IHD, heart failure,  hyperlipidemia, hypertension, coronary artery disease, viral hepatitis, type 2 diabetes and schizophrenia who presented to the emergency department earlier today for midsternal chest pain AND shortness of breath. Patient reported that she had called her cardiologist (Dr. Cha Persaud) who advised patient to be seen in the emergency room for further work-up. On 1/3/2021 patient was diagnosed with COVID-19 pneumonia, she remains positive. Labs are baseline for patient. CXR showed no acute abnormalities. Cardiac enzymes negative. She is hemodynamically stable and afebrile. Patient originally admitted to cardiology services; however, due to the complexity of patient's medical needs internal medicine was consulted to take over for primary care. Patient Seen, Chart, Labs, Radiologystudies, and Consults reviewed. Patient currently denies headache, chest pain, shortness of breath, N/V/D/C, fever/chills. Physical Exam  Cardiovascular:      Rate and Rhythm: Normal rate and regular rhythm. Pulses: Normal pulses. Heart sounds: Normal heart sounds. Pulmonary:      Effort: Pulmonary effort is normal.      Breath sounds: Normal breath sounds. Review of Systems   Constitutional: Positive for appetite change and fatigue. HENT: Negative. Eyes: Negative. Respiratory: Positive for cough and shortness of breath. Cardiovascular: Positive for chest pain. Gastrointestinal: Negative. Endocrine: Negative. Genitourinary: Negative. Musculoskeletal: Negative. Skin: Negative. Allergic/Immunologic: Negative. Psychiatric/Behavioral: Negative. Medications:  Reviewed     No current facility-administered medications on file prior to encounter.       Current Outpatient Medications on File Prior to Encounter   Medication Sig Dispense Refill    insulin aspart (NOVOLOG FLEXPEN) 100 UNIT/ML injection pen INJECT 12 units with each meals 15 mL 10    ranolazine (RANEXA) 500 MG extended release tablet Take 1 tablet by mouth 2 times daily 180 tablet 2    pregabalin (LYRICA) 75 MG capsule TAKE ONE (1) CAPSULE BY MOUTH TWICE DAILY 60 capsule 2    ARIPiprazole (ABILIFY) 5 MG tablet TAKE 1 TABLET BY MOUTH DAILY 30 tablet 2    BRILINTA 90 MG TABS tablet TAKE 1 TABLET BY MOUTH 2 TIMES DAILY 60 tablet 11    pantoprazole (PROTONIX) 20 MG tablet TAKE 1 TABLET BY MOUTH DAILY 90 tablet 3    vitamin B-12 (CYANOCOBALAMIN) 100 MCG tablet TAKE 1 TABLET BY MOUTH DAILY 30 tablet 10    nystatin (NYAMYC) 355884 UNIT/GM powder APPLY TO ABDOMINAL FOLDS EVERY 12 HOURS AS NEEDED 60 g 2    B Complex-C-Folic Acid (NEPHRO VITAMINS) 0.8 MG TABS Take by mouth daily       sertraline (ZOLOFT) 50 MG tablet TAKE 1 TABLET BY MOUTH DAILY 90 tablet 3    LANTUS SOLOSTAR 100 UNIT/ML injection pen INJECT 35 UNITS SUBCUTANEOUSLY AT NIGHT 15 mL 10    isosorbide mononitrate (IMDUR) 60 MG extended release tablet Take 1 tablet by mouth daily 90 tablet 3    amLODIPine (NORVASC) 10 MG tablet TAKE 1 TABLET BY MOUTH DAILY 90 tablet 1    aspirin 81 MG tablet Take 1 tablet by mouth daily 90 tablet 3    atorvastatin (LIPITOR) 40 MG tablet Take 1 tablet by mouth daily 90 tablet 3    melatonin 3 MG TABS tablet TAKE 1 TABLET BY MOUTH EVERY NIGHT AS NEEDED FOR INSOMNIA 180 tablet 4    nitroGLYCERIN (NITROSTAT) 0.4 MG SL tablet Place 1 tablet under the tongue every 5 minutes as needed for Chest pain      magnesium oxide (MAG-OX) 400 MG tablet Take 400 mg by mouth daily      Misc. Devices (BARIATRIC ROLLATOR) MISC Rolllator with a basket 1 each 0    meclizine (ANTIVERT) 25 MG tablet Take 1 tablet by mouth 3 times daily as needed for Dizziness 15 tablet 0    Blood Glucose Monitoring Suppl (FREESTYLE LITE) CORETTA 1 Device by Does not apply route daily as needed (Diabetes) Use freestyle meter to test blood sugar as needed 1 Device 0    triamcinolone (KENALOG) 0.1 % cream APPLY TO AFFECTED AREA TWICE DAILY AS DIRECTED 80 g 1    FREESTYLE LITE strip TEST FOUR TIMES DAILY BEFORE MEALS AND NIGHTLY 150 strip 3    albuterol sulfate HFA (VENTOLIN HFA) 108 (90 Base) MCG/ACT inhaler Inhale 2 puffs into the lungs every 6 hours as needed for Wheezing      fluticasone (FLOVENT HFA) 110 MCG/ACT inhaler Inhale 2 puffs into the lungs 2 times daily (Patient taking differently: Inhale 2 puffs into the lungs 2 times daily as needed ) 1 Inhaler 12    Misc. Devices [facility]) MIS To use with transport outside of the house.  1 each 0    ipratropium-albuterol (DUONEB) 0.5-2.5 (3) MG/3ML SOLN nebulizer solution Inhale 3 mLs into the lungs every 4 hours as needed for Shortness of Breath 360 mL 0    SURE COMFORT PEN NEEDLES 30G X 8 MM MISC USE AS DIRECTED FIVE TIMES A  each 10        Past Medical History:   Diagnosis Date    Anxiety     CAD S/P percutaneous coronary angioplasty 2015, 2018    stents per dr Kaya Cheek    [facility]estive [facility] failure) [facility]     CKD (chronic kidney disease) stage 4, GFR 15-29 ml/min ([address] 2/24/2018    CKD stage 4 due to type 2 diabetes mellitus ([address]     COPD (chronic obstructive pulmonary disease) (HCC)     Diabetic nephropathy with proteinuria ([address] 2014    DJD (degenerative joint disease) of knee     Dr Minh Baron GERD (gastroesophageal reflux disease)     Hemiparesis, left ([facility]     [facility] Ass[facility] Living ([facility]    Hemodialysis patient [facility])     History of heart failure     History of seizures     History of type C viral hepatitis     HTN (hypertension)     Hyperlipidemia     Impaired mobility and activities of daily living     Mediastinal lymphadenopathy     Amy Scruggs    Metabolic syndrome     Moderate persistent asthma without complication     Neurogenic urinary incontinence 2013    Neuropathy in diabetes ([facility]     Obesity (BMI 30-39. 9)     Recurrent UTI     S/P colonoscopy     [facility], focal active colitis    Schizophrenia, paranoid, chronic ([facility]     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 Glenna Chan      x1 Dr. Alexandra Blanco, Dr Borges Repress CATH LAB PROCEDURE  10/02/2019    HYSTERECTOMY, TOTAL ABDOMINAL      one ovary intact, Dr Doreen Marino, menorrhagia    VT TOTAL KNEE ARTHROPLASTY Left 2018    LEFT KNEE TOTAL KNEE ARTHROPLASTY, SHAYNA, NERVE BLOCK performed by Ra Schulte MD at [address] [place] [address] Right     TOTAL KNEE ARTHROPLASTY  16    Dr Jorge Billings TUNNELED [address] Right 2020    tunneled HD catheter per Dr Jenene Councilman        Family History   Problem Relation Age of Onset    Cancer Mother 76        survived   Dot Rebeka Hypertension Father     Diabetes Sister  Mental Illness Sister         Social History     Socioeconomic History    Marital status:      Spouse name: Not on file    Number of children: 2    Years of education: Not on file    Highest education level: Not on file   Occupational History    Occupation: disabled   Social Needs    Financial resource strain: Not on file    Food insecurity     Worry: Not on file     Inability: Not on file   [employer] needs     Medical: Not on file     Non-medical: Not on file   Tobacco Use    Smoking status: Passive Smoke Exposure - Never Smoker    Smokeless tobacco: Never Used   Substance and Sexual Activity    Alcohol use: No     Alcohol/week: 0.0 standard drinks    Drug use: No    Sexual activity: Not Currently   Lifestyle    Physical activity     Days per week: Not on file     Minutes per session: Not on file    Stress: Not on file   Relationships    Social connections     Talks on phone: Not on file     Gets together: Not on file     Attends Mandaeism service: Not on file     Active member of club or organization: Not on file     Attends meetings of clubs or organizations: Not on file     Relationship status: Not on file    Intimate partner violence     Fear of current or ex partner: Not on file     Emotionally abused: Not on file     Physically abused: Not on file     Forced sexual activity: Not on file   Other Topics Concern    Not on file   Social History Narrative    Born in [place], one of 5    Twin sister Reyes, very ill in 2018, [place] [id number]    Moved to [facility], , 2 children, one son and one daughter    Worked at [employer], as a nurse's aide    Disabled due to mental illness    Lived at [facility], was discharged, returned to independent living in 2017 in the daughter's house and has adjusted well    One son and one daughter, live in the same house with patient, Renuka Haney pays the rent    Hobbies reading (misteries)        Infusion Medications:    sodium chloride 75 mL/hr at 01/13/21 0245    dextrose       Scheduled Medications:    sodium chloride flush  10 mL Intravenous 2 times per day    enoxaparin  40 mg Subcutaneous Daily    amLODIPine  10 mg Oral Daily    ARIPiprazole  5 mg Oral Daily    aspirin  81 mg Oral Daily    atorvastatin  40 mg Oral Nightly    ticagrelor  90 mg Oral BID    fluticasone  2 puff Inhalation BID    isosorbide mononitrate  60 mg Oral Daily    magnesium oxide  400 mg Oral Daily    pantoprazole  20 mg Oral Daily    pregabalin  75 mg Oral BID    ranolazine  500 mg Oral BID    sertraline  50 mg Oral Daily    insulin lispro  0-12 Units Subcutaneous TID WC    insulin lispro  0-6 Units Subcutaneous Nightly    insulin lispro  6 Units Subcutaneous TID WC    insulin glargine  50 Units Subcutaneous Nightly     PRN Meds: sodium chloride flush, acetaminophen, ondansetron, [Held by provider] ipratropium-albuterol, meclizine, nitroGLYCERIN, glucose, dextrose, glucagon (rDNA), dextrose    Labs:   Recent Labs     01/12/21  1530   WBC 7.9   HGB 10.1*   HCT 28.7*        Recent Labs     01/12/21  1530 01/13/21  0849   * 131*   K 3.6 3.5   CL 91* 93*   CO2 29 27   BUN 13 18   CREATININE 2.04* 2.77*   CALCIUM 9.8 9.0     Recent Labs     01/12/21  1530   AST 30   ALT 15   BILITOT 1.0*   ALKPHOS 149*     No results for input(s): INR in the last 72 hours. Recent Labs     01/12/21  1530 01/13/21  0849   TROPONINI 0.018* <0.010       Urinalysis:   Lab Results   Component Value Date    NITRU Negative 12/25/2020    WBCUA 0-2 12/25/2020    BACTERIA Negative 12/25/2020    RBCUA 0-2 12/25/2020    BLOODU Negative 12/25/2020    SPECGRAV 1.020 12/25/2020    GLUCOSEU 500 12/25/2020       Radiology:   Most recent    Chest CT      WITH CONTRAST:No results found for this or any previous visit. WITHOUT CONTRAST: No results found for this or any previous visit.     CXR      2-view:   Results for orders placed during the hospital encounter of 10/02/19   XR CHEST STANDARD (2 VW)    Narrative EXAMINATION: Chest x-ray, 2 view    HISTORY: Shortness of breath    TECHNIQUE: Frontal and lateral views of the chest    COMPARISON: Chest x-ray from October 2, 2019    FINDINGS:     Cardiomediastinal silhouette is within normal limits. No pneumothorax, pleural effusion, or focal consolidation. Chronic cerebral compression deformity of a lower thoracic vertebral body. No acute osseous abnormality. Degenerative changes of the spine. Impression No acute intrathoracic process. XR CHEST STANDARD (2 VW)    Narrative History: Shortness of breath    COMPARISON: February 23, 2018 chest x-ray and December 16, 2014 CT chest.. FINDINGS:     Cardiac silhouette is at the upper limits of normal in size with cardiothoracic ratio of approximately 0.50, likely related to hypoinflation. Mediastinal contour unremarkable. Mild nonspecific prominence of the bronchovascular markings unchanged and   accentuated by hypoinflation. . There are no focal infiltrates, pulmonary edema or pleural effusions. Linear density within the anterior aspect of the cardiac silhouette on the lateral film is compatible with coronary vascular stents . Stable compression deformity of the T11 vertebral body since 2018 chest x-ray with approximately one third reduction in height. This was not present on the CT from December 16, 2014. Right mild to moderate and mild left acromial clavicular joint degenerative changes. Healed right-sided rib fractures unchanged. .        Impression Impression:     Stable chest x-ray since February 23, 2018. No acute infiltrates. Portable:   Results for orders placed during the hospital encounter of 01/12/21   XR CHEST PORTABLE    Narrative XR CHEST PORTABLE    Clinical History:     chest pain . Comparison:  12/25/2020      RESULT:     No consolidation. Probable bibasilar scarring/atelectasis, unchanged. No pleural effusion. No pneumothorax.  Normal pulmonary vascular pattern. Stable mildly enlarged cardiomediastinal silhouette. No acute osseous findings. Degenerative changes. Impression No acute radiographic abnormality or significant interval change. Echo No results found for this or any previous visit. Assessment/Plan:    # Covid 19: SPO2 100% on room air. Does not require oxygen, steroids or anticoagulation per protocol. Patient to remain under contact precautions and we will continue to monitor SPO2. # ESRD with renal anemia on IHD TTS: Nephrology will resume HD TTS tomorrow. Continue to monitor H&H    # Angina: Angioplasty of the mid LAD with drug-eluting stent. Cardiology to follow. Resume Plavix, statin and aspirin. DMII with hyperglycemia: Endocrinology consulted for hyperglycemia. We will continue ISS, hypoglycemia protocol POCT Glucose TIDAC & QHS          I personally spent estimated 60 minutes with this patient today. Additional work up or/and treatment plan may be added today or then after based on clinical progression. I am managing a portion of pt care. Some medical issues are handled by other specialists. Additional work up and treatment should be done in out pt setting by pt PCP and other out pt providers. In addition to examining and evaluating pt, I spent additional time explaining care, normaland abnormal findings, and treatment plan. All of pt questions were answered. Counseling, diet and education were provided. Case will be discussed with nursing staff when appropriate. Family will be updated if and when appropriate.       Electronically signed by LORETO Villeda CNP on 1/13/2021 at 7:25 PM

## 2021-01-14 NOTE — PROGRESS NOTES
Nephrology Progress Note    Assessment:  56 y. o. year old female with history s/f ESRd on IHD TTS, CAD s/p DEWEY, CHF, T2DM, GERD, asthma and schizophrenia who presented for persistent hyperglycemia and lethargy.     1. ESRD on IHD TTS, pt of Dr. Rylee Powell at [facility]   2. Covid infection: initially diagnosed 01/03, not currently requiring any therapy   3. HTN  4. Renal anemia  5.  Secondary renal hyperparathyroidism: on sensipar      Plan:  - continue IHD TTS   - ok for discharge from renal standpoint once medically cleared     Patient Active Problem List:     Coronary artery disease involving native heart with angina pectoris (HCC)     Schizophrenia, paranoid, chronic     Metabolic syndrome     Vitamin B 12 deficiency     Cerebral microvascular disease     Mixed hyperlipidemia     Other hammer toe (acquired)     Vitamin D insufficiency     Incontinence of urine     Diabetic nephropathy with proteinuria ([address]     Essential hypertension     History of type C viral hepatitis     Urinary incontinence due to cognitive impairment     History of seizures     Stented coronary artery-plan is to stay on Plavix indefinately per Dr Citlaly Cristobal     Hemiparesis, left ([address]     Angina, class II ([address]     Chronic painful diabetic neuropathy ([address]     Tardive dyskinesia     Shortness of breath     Uncontrolled type 2 diabetes mellitus with hyperglycemia (HCC)     Diastolic congestive heart failure (HCC)     Sleep apnea     Pulmonary hypertension (HCC)     Class 2 severe obesity with serious comorbidity and body mass index (BMI) of 36.0 to 36.9 in adult [facility])     Edema     Closed supracondylar fracture of right humerus     Other chronic pain     Palliative care patient     Chest pain     Recurrent falls     Renal failure     Difficulty in walking     ESRD (end stage renal disease) on dialysis ([address]     Weakness     Moderate persistent asthma without complication     Thrush     COVID-19      Subjective:  Admit Date: 1/12/2021    Interval History: getting IHD, remains on room air     Medications:  Scheduled Meds:   sodium chloride flush  10 mL Intravenous 2 times per day    enoxaparin  40 mg Subcutaneous Daily    amLODIPine  10 mg Oral Daily    ARIPiprazole  5 mg Oral Daily    aspirin  81 mg Oral Daily    atorvastatin  40 mg Oral Nightly    ticagrelor  90 mg Oral BID    fluticasone  2 puff Inhalation BID    isosorbide mononitrate  60 mg Oral Daily    magnesium oxide  400 mg Oral Daily    pantoprazole  20 mg Oral Daily    pregabalin  75 mg Oral BID    ranolazine  500 mg Oral BID    sertraline  50 mg Oral Daily    insulin lispro  0-12 Units Subcutaneous TID WC    insulin lispro  0-6 Units Subcutaneous Nightly    insulin lispro  6 Units Subcutaneous TID WC    insulin glargine  50 Units Subcutaneous Nightly     Continuous Infusions:   sodium chloride      dextrose         CBC:   Recent Labs     01/12/21  1530   WBC 7.9   HGB 10.1*        CMP:    Recent Labs     01/12/21  1530 01/13/21  0849   * 131*   K 3.6 3.5   CL 91* 93*   CO2 29 27   BUN 13 18   CREATININE 2.04* 2.77*   GLUCOSE 256* 221*   CALCIUM 9.8 9.0   LABGLOM 24.6* 17.3*     Troponin:   Recent Labs     01/13/21  0849   TROPONINI <0.010     BNP: No results for input(s): BNP in the last 72 hours. INR: No results for input(s): INR in the last 72 hours. Lipids: No results for input(s): CHOL, LDLDIRECT, TRIG, HDL, AMYLASE, LIPASE in the last 72 hours. Liver:   Recent Labs     01/12/21  1530   AST 30   ALT 15   ALKPHOS 149*   PROT 8.0   LABALBU 4.4   BILITOT 1.0*     Iron:  No results for input(s): IRONS, FERRITIN in the last 72 hours. Invalid input(s): LABIRONS  Urinalysis: No results for input(s): UA in the last 72 hours.     Objective:  Vitals: BP (!) 113/49   Pulse 76   Temp 97.1 °F (36.2 °C)   Resp 16   Ht 5' 7\" (1.702 m)   Wt 207 lb (93.9 kg)   LMP  (LMP Unknown)   SpO2 98%   BMI 32.42 kg/m²    Wt Readings from Last 3 Encounters:   01/12/21 207 lb (93.9 kg)   01/03/21 207 lb (93.9 kg)   12/27/20 210 lb 15.7 oz (95.7 kg)      24HR INTAKE/OUTPUT:      Intake/Output Summary (Last 24 hours) at 1/14/2021 1130  Last data filed at 1/13/2021 1210  Gross per 24 hour   Intake 240 ml   Output --   Net 240 ml          General: alert, in no apparent distress  HEENT: normocephalic, atraumatic, anicteric  Lungs: non-labored respirations, clear to auscultation bilaterally  Heart: regular rate and rhythm, no murmurs or rubs  Abdomen: soft, non-tender, non-distended  MSK: no joint swelling or tenderness  Ext: no cyanosis, no peripheral edema  Neuro: alert and oriented, no gross abnormalities      Electronically signed by Shelly Peabody, MD

## 2021-01-14 NOTE — PROGRESS NOTES
Dialysis note    2200mLs removed during Hd, tolerated well, no adverse events noted. Blood was returned post Hd, needles pulled and sites held until hemostasis was achieved.

## 2021-01-15 ENCOUNTER — CARE COORDINATION (OUTPATIENT)
Dept: CASE MANAGEMENT | Age: 61
End: 2021-01-15

## 2021-01-15 DIAGNOSIS — R07.9 CHEST PAIN, UNSPECIFIED TYPE: Primary | ICD-10-CM

## 2021-01-15 PROCEDURE — 1111F DSCHRG MED/CURRENT MED MERGE: CPT | Performed by: FAMILY MEDICINE

## 2021-01-15 NOTE — CARE COORDINATION
Alma Rosa [facility] Transitions Initial Follow Up Call    Call within 2 business days of discharge: Yes    Patient: France Sosa Patient : 1960   MRN: [id number]  Reason for Admission: -2021 [address] Chest Pain, CV-19. Discharge Date: 21 RARS: Readmission Risk Score: 30  Readmit  2021 CV-19 pos. PCP VV  2:00  [facility]  Med rec/1111F order completed. CT/CV-19 Transitions      Challenges to be reviewed by the provider   Additional needs identified to be addressed with provider No  [facility] [address]    Discussed COVID-19 related testing which was available at this time. Test results were positive. Patient informed of results, if available? Yes         Method of communication with provider : none    Advance Care Planning:   Does patient have an Advance Directive:  reviewed and current. Was this a readmission? Yes  Patient stated reason for admission: CP, CV-19  Patients top risk factors for readmission: functional cognitive ability and medical condition    Care Transition Nurse (CTN) contacted the patient by telephone to perform post hospital discharge assessment. Verified name and  with patient as identifiers. Provided introduction to self, and explanation of the CTN role. CTN reviewed discharge instructions, medical action plan and red flags with patient who verbalized understanding. Patient given an opportunity to ask questions and does not have any further questions or concerns at this time. Were discharge instructions available to patient? Yes. Reviewed appropriate site of care based on symptoms and resources available to patient including: When to call 911 and Reviewed quarantine. Reviewed worsened symptoms to notify her PCP. Marguerite Ham The patient agrees to contact the PCP office for questions related to their healthcare. Medication reconciliation was performed with patient, who verbalizes understanding of administration of home medications.  Advised obtaining a 90-day supply of all daily and as-needed medications. Covid Risk Education    Patient has following risk factors of: diabetes and schizophrenia. Education provided regarding infection prevention, and signs and symptoms of COVID-19 and when to seek medical attention with patient who verbalized understanding. Discussed exposure protocols and quarantine From CDC: Are you at higher risk for severe illness?   and given an opportunity for questions and concerns. The patient agrees to contact the COVID-19 hotline [phone] or PCP office for questions related to COVID-19. For more information on steps you can take to protect yourself, see CDC's How to Protect Yourself     Patient/family/caregiver given information for [employer] and agrees to enroll no  Patient's preferred e-mail: Does not have an email account. Patient's preferred phone number: declines    Discussed follow-up appointments. If no appointment was previously scheduled, appointment scheduling offered: Pt advised to schedule fu w/ endocrinology after her quarintine. Hosp FU 1/18 2:00. Is follow up appointment scheduled within 7 days of discharge? Yes  Non-[facility] follow up appointment(s):     Plan for follow-up call in 5-7 days based on severity of symptoms and risk factors. Plan for next call: BRANDEN. CV-19. Resp status. CTN provided contact information for future needs. Pt reports she feels \"fine\". Denies any chest pain/pressure, palpitations, or dizziness. States she has exertional SOB but \"it's not bad\". Reminded to use her inhaler meds, v/u. Reviewed quarantine. States today's BS was 341. Pt instructed to schedule fu w/ Dr Meena Hardy, v/u. Reviewed meds/insulins. Pt states her highest BS are in the AM. States she is using her long acting insulin nightly and restates proper dose back to me. Strongly enc her to avoid unnecessary carbs in foods and fluids, v/u. States she has VV w/ [facility]. Denies any home or med needs at this time.  Pt enc to call me w/ any upcoming needs/concerns, v/u.       Care Transitions 24 Hour Call    Do you have any ongoing symptoms?: Yes  Patient-reported symptoms: Shortness of Breath  Do you have a copy of your discharge instructions?: Yes  Do you have all of your prescriptions and are they filled?: Yes  Have you been contacted by a [facility] Pharmacist?: No  Have you scheduled your follow up appointment?: Yes  Were you discharged with any Home Care or Post Acute Services: Yes  Post Acute Services: Home Health (Comment: [facility] [address])  Patient DME: Shara Mckeon chair  Do you have support at home?: Child, Grandchild  Do you feel like you have everything you need to keep you well at home?: Yes  Are you an active caregiver in your home?: No  Care Transitions Interventions         Follow Up  Future Appointments   Date Time Provider Aminata Cortes   1/18/2021  2:00 PM Terence Keating PA-C [facility]   1/27/2021 12:45 PM Perry Blankenship MD [address] [place] [address]   5/4/2021 10:00 AM Rey Stone MD [address] [place] [address]

## 2021-01-18 ENCOUNTER — VIRTUAL VISIT (OUTPATIENT)
Dept: FAMILY MEDICINE CLINIC | Age: 61
End: 2021-01-18
Payer: MEDICARE

## 2021-01-18 DIAGNOSIS — J45.40 MODERATE PERSISTENT ASTHMA WITHOUT COMPLICATION: Chronic | ICD-10-CM

## 2021-01-18 DIAGNOSIS — Z99.2 ESRD (END STAGE RENAL DISEASE) ON DIALYSIS (HCC): ICD-10-CM

## 2021-01-18 DIAGNOSIS — E11.40 CHRONIC PAINFUL DIABETIC NEUROPATHY (HCC): ICD-10-CM

## 2021-01-18 DIAGNOSIS — N18.6 ESRD (END STAGE RENAL DISEASE) ON DIALYSIS (HCC): ICD-10-CM

## 2021-01-18 DIAGNOSIS — U07.1 COVID-19: ICD-10-CM

## 2021-01-18 DIAGNOSIS — R53.1 WEAKNESS: ICD-10-CM

## 2021-01-18 DIAGNOSIS — R07.9 CHEST PAIN, UNSPECIFIED TYPE: Primary | ICD-10-CM

## 2021-01-18 DIAGNOSIS — I25.119 CORONARY ARTERY DISEASE INVOLVING NATIVE CORONARY ARTERY OF NATIVE HEART WITH ANGINA PECTORIS (HCC): Chronic | ICD-10-CM

## 2021-01-18 PROCEDURE — 2022F DILAT RTA XM EVC RTNOPTHY: CPT | Performed by: PHYSICIAN ASSISTANT

## 2021-01-18 PROCEDURE — 3051F HG A1C>EQUAL 7.0%<8.0%: CPT | Performed by: PHYSICIAN ASSISTANT

## 2021-01-18 PROCEDURE — G8427 DOCREV CUR MEDS BY ELIG CLIN: HCPCS | Performed by: PHYSICIAN ASSISTANT

## 2021-01-18 PROCEDURE — 99214 OFFICE O/P EST MOD 30 MIN: CPT | Performed by: PHYSICIAN ASSISTANT

## 2021-01-18 PROCEDURE — 1111F DSCHRG MED/CURRENT MED MERGE: CPT | Performed by: PHYSICIAN ASSISTANT

## 2021-01-18 PROCEDURE — 3017F COLORECTAL CA SCREEN DOC REV: CPT | Performed by: PHYSICIAN ASSISTANT

## 2021-01-18 RX ORDER — PREGABALIN 75 MG/1
CAPSULE ORAL
Qty: 60 CAPSULE | Refills: 2 | Status: SHIPPED | OUTPATIENT
Start: 2021-01-18 | End: 2021-01-22 | Stop reason: SDUPTHER

## 2021-01-18 RX ORDER — BLOOD-GLUCOSE METER
1 KIT MISCELLANEOUS
Qty: 200 STRIP | Refills: 5 | Status: SHIPPED | OUTPATIENT
Start: 2021-01-18 | End: 2021-03-11 | Stop reason: SDUPTHER

## 2021-01-18 RX ORDER — UBIDECARENONE 75 MG
100 CAPSULE ORAL DAILY
Qty: 30 TABLET | Refills: 10 | Status: SHIPPED | OUTPATIENT
Start: 2021-01-18 | End: 2021-08-17

## 2021-01-18 ASSESSMENT — ENCOUNTER SYMPTOMS
ABDOMINAL PAIN: 0
COLOR CHANGE: 0
COUGH: 0
SHORTNESS OF BREATH: 0
WHEEZING: 0
CHEST TIGHTNESS: 0
TROUBLE SWALLOWING: 0
ABDOMINAL DISTENTION: 0

## 2021-01-18 NOTE — PROGRESS NOTES
2021    TELEHEALTH EVALUATION -- Audio/Visual (During QAEFS-97 public health emergency)    Due to [place] 19 outbreak, patient's office visit was converted to a virtual visit. Patient was contacted and agreed to proceed with a virtual visit via Telephone Visit--total length of call 15-20 minutes. The risks and benefits of converting to a virtual visit were discussed in light of the current infectious disease epidemic. Patient also understood that insurance coverage and co-pays are up to their individual insurance plans. HPI:    Bruno Stone (:  1960) has requested an audio/video evaluation for the following concern(s):    On virtual visit today for hospital follow up. Patient is routinely followed by Dom Pedroza. Hospital follow up. Patient admitted to [facility] on 2021 for chest pain and dyspnea. Diagnosed with covid-19 on 2020. History of ESRD, type 2 diabees, CAD on DAPT, reactive airway disease and schizophrenia. Did not have hypoxia on hospital admission, she was able to ambulate on room air. Not found to need supplemental oxygen. While admitted, insulin therapy was adjusted by endocrinology. Cardiology was consulted and CP was evaluated. Low-dose lopressor was added to medication regimen. Patient found to be stable and was discharged home. [facility] services resumed. Patient is home, continued with HD treatment three times weekly. She dialyzes T, R, S.   Able to tolerate treatment. Denies worsening cough, SOB, CP has resolved. Notes to have continued fatigue. No abnormal bleeding, epistaxis, ecchymosis. Appetite is good. Asking for a refill today for her B-12 supplement and gabapentin for diabetic retinopathy. Review of Systems   Constitutional: Positive for activity change and fatigue. Negative for appetite change, chills, diaphoresis and fever. HENT: Negative for trouble swallowing. Respiratory: Negative for cough, chest tightness, shortness of breath and wheezing. Cardiovascular: Negative for chest pain, palpitations and leg swelling. Gastrointestinal: Negative for abdominal distention and abdominal pain. Skin: Negative for color change. Neurological: Negative for dizziness, light-headedness, numbness and headaches. Hematological: Bruises/bleeds easily. Psychiatric/Behavioral: Negative for agitation, dysphoric mood and sleep disturbance. The patient is not nervous/anxious. Prior to Visit Medications    Medication Sig Taking? Authorizing Provider   pregabalin (LYRICA) 75 MG capsule TAKE ONE (1) CAPSULE BY MOUTH TWICE DAILY Yes Mariela Staples PA-C   vitamin B-12 (CYANOCOBALAMIN) 100 MCG tablet Take 1 tablet by mouth daily Yes Mariela Staples PA-C   blood glucose test strips (FREESTYLE LITE) strip 1 each by Does not apply route 4 times daily (before meals and nightly) As needed. MARCELO Garnett   insulin aspart (NOVOLOG FLEXPEN) 100 UNIT/ML injection pen INJECT 6 units with each meals  Shey Mora DO   LANTUS SOLOSTAR 100 UNIT/ML injection pen Inject 45 Units into the skin nightly  Shey Mora DO   metoprolol tartrate (LOPRESSOR) 25 MG tablet Take 0.5 tablets by mouth 2 times daily  Shey Mora DO   Misc.  Devices (BARIATRIC ROLLATOR) MISC Rolllator with a basket  Chad Eng MD   Blood Glucose Monitoring Suppl (FREESTYLE LITE) CORETTA 1 Device by Does not apply route daily as needed (Diabetes) Use freestyle meter to test blood sugar as needed  MARCELO Garnett   ranolazine (RANEXA) 500 MG extended release tablet Take 1 tablet by mouth 2 times daily  Scar Eagle MD   triamcinolone (KENALOG) 0.1 % cream APPLY TO AFFECTED AREA TWICE DAILY AS DIRECTED  Chad Eng MD   ARIPiprazole (ABILIFY) 5 MG tablet TAKE 1 TABLET BY MOUTH DAILY  Chad Eng MD BRILINTA 90 MG TABS tablet TAKE 1 TABLET BY MOUTH 2 TIMES DAILY  Anastasiya Vidal MD   pantoprazole (PROTONIX) 20 MG tablet TAKE 1 TABLET BY MOUTH DAILY  Anastasiya Vidal MD   nystatin (NYAMYC) 127551 UNIT/GM powder APPLY TO ABDOMINAL FOLDS EVERY 12 HOURS AS NEEDED  Kiarra Granados MD   B Complex-C-Folic Acid (NEPHRO VITAMINS) 0.8 MG TABS Take by mouth daily   Historical Provider, MD   albuterol sulfate HFA (VENTOLIN HFA) 108 (90 Base) MCG/ACT inhaler Inhale 2 puffs into the lungs every 6 hours as needed for Wheezing  Historical Provider, MD   sertraline (ZOLOFT) 50 MG tablet TAKE 1 TABLET BY MOUTH DAILY  Kiarra Granados MD   fluticasone (FLOVENT HFA) 110 MCG/ACT inhaler Inhale 2 puffs into the lungs 2 times daily  Patient taking differently: Inhale 2 puffs into the lungs 2 times daily as needed   Kiarra Granados MD   Misc. Devices [facility]) MISC To use with transport outside of the house.   Kiarra Granados MD   isosorbide mononitrate (IMDUR) 60 MG extended release tablet Take 1 tablet by mouth daily  Tiffanie Wilkins MD   ipratropium-albuterol (DUONEB) 0.5-2.5 (3) MG/3ML SOLN nebulizer solution Inhale 3 mLs into the lungs every 4 hours as needed for Shortness of Breath  Luis Hayden MD   amLODIPine (NORVASC) 10 MG tablet TAKE 1 TABLET BY MOUTH DAILY  Anastasiya Vidal MD   aspirin 81 MG tablet Take 1 tablet by mouth daily  Kiarra Granados MD   atorvastatin (LIPITOR) 40 MG tablet Take 1 tablet by mouth daily  Kiarra Granados MD   SURE COMFORT PEN NEEDLES 30G X 8 MM MISC USE AS DIRECTED FIVE TIMES A DAY  Kiarra Granados MD   melatonin 3 MG TABS tablet TAKE 1 TABLET BY MOUTH EVERY NIGHT AS NEEDED FOR INSOMNIA  Kiarra Granados MD   nitroGLYCERIN (NITROSTAT) 0.4 MG SL tablet Place 1 tablet under the tongue every 5 minutes as needed for Chest pain  Historical Provider, MD magnesium oxide (MAG-OX) 400 MG tablet Take 400 mg by mouth daily  Historical Provider, MD       Social History     Tobacco Use    Smoking status: Passive Smoke Exposure - Never Smoker    Smokeless tobacco: Never Used   Substance Use Topics    Alcohol use: No     Alcohol/week: 0.0 standard drinks    Drug use: No        Allergies   Allergen Reactions    Codeine Hives     hives    Oxycontin [Oxycodone Hcl] Hives   ,   Past Medical History:   Diagnosis Date    Anxiety     CAD S/P percutaneous coronary angioplasty 2015, 2018    stents per dr Ramon Paz    [facility]estive [facility] failure) [facility]     CKD (chronic kidney disease) stage 4, GFR 15-29 ml/min ([facility] 2/24/2018    CKD stage 4 due to type 2 diabetes mellitus ([facility]     COPD (chronic obstructive pulmonary disease) ([facility]     Diabetic nephropathy with proteinuria ([facility] 2014    DJD (degenerative joint disease) of knee     Dr Cathy Patel GERD (gastroesophageal reflux disease)     Hemiparesis, left ([facility] 2013    entered Assisted Living ([facility]    Hemodialysis patient [facility])     History of heart failure     History of seizures     History of type C viral hepatitis     HTN (hypertension)     Hyperlipidemia     Impaired mobility and activities of daily living     Mediastinal lymphadenopathy 2013    Alvina Millerenzo Horse    Metabolic syndrome     Moderate persistent asthma without complication 9/62/9346    Neurogenic urinary incontinence 2013    Neuropathy in diabetes ([facility]     Obesity (BMI 30-39. 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 Janine Carballo      x1 Dr. Yovani Owens, Dr Kapil Clifford CATH LAB PROCEDURE  10/02/2019    HYSTERECTOMY, TOTAL ABDOMINAL      one ovary intact, Dr Selena Yepez, menorrhagia    IL TOTAL KNEE ARTHROPLASTY Left 2018    LEFT KNEE TOTAL KNEE ARTHROPLASTY, SHAYNA, NERVE BLOCK performed by Ricardo Arevalo MD at [address] [place] [address] Right     TOTAL KNEE ARTHROPLASTY  16    Dr Quentin Hargrove TUNNELED [address] Right 2020    tunneled HD catheter per Dr Delores Dailey   ,   Social History     Tobacco Use    Smoking status: Passive Smoke Exposure - Never Smoker    Smokeless tobacco: Never Used   Substance Use Topics    Alcohol use: No     Alcohol/week: 0.0 standard drinks    Drug use: No   ,   Family History   Problem Relation Age of Onset    Cancer Mother 76        survived   Gisele Puente Hypertension Father     Diabetes Sister     Mental Illness Sister    ,   Immunization History   Administered Date(s) Administered    Influenza Vaccine, unspecified formulation 10/14/2016    Influenza Virus Vaccine 10/20/2014, 10/30/2015, 10/14/2016, 2017, 10/12/2018    Influenza Whole 10/20/2014    Influenza, Quadv, IM, (6 mo and older Fluzone, Flulaval, Fluarix and 3 yrs and older Afluria) 2017, 10/12/2018    Influenza, Quadv, IM, PF (6 mo and older Fluzone, Flulaval, Fluarix, and 3 yrs and older Afluria) 10/03/2019    Influenza, Triv, 3 Years and older, IM (Afluria (5 yrs and older) 10/14/2016    Pneumococcal Polysaccharide ([id number]) 10/15/2016    Tdap (Boostrix, Adacel) 2020   ,   Health Maintenance   Topic Date Due    Shingles Vaccine (1 of 2) 2008  Flu vaccine (1) 06/30/2021 (Originally 9/1/2020)    Pneumococcal 0-64 years Vaccine (2 of 3 - PCV13) 12/24/2021 (Originally 10/15/2017)    Annual Wellness Visit (AWV)  09/24/2021    A1C test (Diabetic or Prediabetic)  01/13/2022    Potassium monitoring  01/13/2022    Creatinine monitoring  01/13/2022    Breast cancer screen  10/14/2022    Colon cancer screen colonoscopy  01/09/2024    DTaP/Tdap/Td vaccine (2 - Td) 08/03/2030    Hepatitis A vaccine  Aged Out    Hib vaccine  Aged Out    Meningococcal (ACWY) vaccine  Aged Out       PHYSICAL EXAMINATION:  [ INSTRUCTIONS:  \"[x]\" Indicates a positive item  \"[]\" Indicates a negative item  -- DELETE ALL ITEMS NOT EXAMINED]  [x] Alert  [x] Oriented to person/place/time    [x] No apparent distress  [] Toxic appearing    [] Face flushed appearing [] Sclera clear  [] Lips are cyanotic      [x] Breathing appears normal  [] Appears tachypneic      [] Rash on visible skin    [] Cranial Nerves II-XII grossly intact    [] Motor grossly intact in visible upper extremities    [] Motor grossly intact in visible lower extremities    [x] Normal Mood  [] Anxious appearing    [] Depressed appearing  [] Confused appearing      [] Poor short term memory  [] Poor long term memory    [] OTHER:      Due to this being a TeleHealth encounter, evaluation of the following organ systems is limited: Vitals/Constitutional/EENT/Resp/CV/GI//MS/Neuro/Skin/Heme-Lymph-Imm. ASSESSMENT/PLAN:  1. Chest pain, unspecified type  Reports no further episode of CP. Low dose lopressor added to medication regimen. 2. COVID-19  Continues with fatigue. No other symptoms at this time. 3. Coronary artery disease involving native coronary artery of native heart with angina pectoris (HCC)  Stable. Continue current medications.    Has follow up with cardiology on 1/27/2021.     4. Chronic painful diabetic neuropathy ([facility] OARRS report ran and is consistent with current and past history concerning scheduled medications. - pregabalin (LYRICA) 75 MG capsule; TAKE ONE (1) CAPSULE BY MOUTH TWICE DAILY  Dispense: 60 capsule; Refill: 2    5. Moderate persistent asthma without complication  Stable. 6. ESRD (end stage renal disease) on dialysis ([facility]  Compliant with dialysis. Has dialysis tomorrow. 7. Weakness  Monitor. Return if symptoms worsen or fail to improve. An  electronic signature was used to authenticate this note. --Rio Antunez PA-C on 1/18/2021 at 7:24 PM        Pursuant to the emergency declaration under the [facility] waiver authority and the [employer] Act, this Virtual  Visit was conducted, with patient's consent, to reduce the patient's risk of exposure to COVID-19 and provide continuity of care for an established patient. Services were provided through a video synchronous discussion virtually to substitute for in-person clinic visit.

## 2021-01-18 NOTE — TELEPHONE ENCOUNTER
Patient calling to request RX refills for freestyle Lite test strips and lancets. Send RXs to [facility].

## 2021-01-19 ENCOUNTER — TELEPHONE (OUTPATIENT)
Dept: OTHER | Facility: CLINIC | Age: 61
End: 2021-01-19

## 2021-01-19 ENCOUNTER — TELEPHONE (OUTPATIENT)
Dept: FAMILY MEDICINE CLINIC | Age: 61
End: 2021-01-19

## 2021-01-19 ENCOUNTER — APPOINTMENT (OUTPATIENT)
Dept: CT IMAGING | Age: 61
DRG: 177 | End: 2021-01-19
Payer: MEDICARE

## 2021-01-19 ENCOUNTER — HOSPITAL ENCOUNTER (INPATIENT)
Age: 61
LOS: 1 days | Discharge: NURSING, REHAB OR LONG-TERM CARE | DRG: 177 | End: 2021-01-22
Attending: INTERNAL MEDICINE | Admitting: INTERNAL MEDICINE
Payer: MEDICARE

## 2021-01-19 DIAGNOSIS — N18.6 STAGE 5 CHRONIC KIDNEY DISEASE ON CHRONIC DIALYSIS (HCC): ICD-10-CM

## 2021-01-19 DIAGNOSIS — S00.03XA CONTUSION OF SCALP, INITIAL ENCOUNTER: ICD-10-CM

## 2021-01-19 DIAGNOSIS — R07.9 CHEST PAIN, UNSPECIFIED TYPE: ICD-10-CM

## 2021-01-19 DIAGNOSIS — R29.6 FALLS FREQUENTLY: Primary | ICD-10-CM

## 2021-01-19 DIAGNOSIS — R10.10 PAIN OF UPPER ABDOMEN: ICD-10-CM

## 2021-01-19 DIAGNOSIS — Z99.2 STAGE 5 CHRONIC KIDNEY DISEASE ON CHRONIC DIALYSIS (HCC): ICD-10-CM

## 2021-01-19 LAB
ALBUMIN SERPL-MCNC: 4 G/DL (ref 3.5–4.6)
ALP BLD-CCNC: 169 U/L (ref 40–130)
ALT SERPL-CCNC: 16 U/L (ref 0–33)
ANION GAP SERPL CALCULATED.3IONS-SCNC: 12 MEQ/L (ref 9–15)
APTT: 34 SEC (ref 24.4–36.8)
AST SERPL-CCNC: 34 U/L (ref 0–35)
BASOPHILS ABSOLUTE: 0.1 K/UL (ref 0–0.2)
BASOPHILS RELATIVE PERCENT: 0.9 %
BILIRUB SERPL-MCNC: 0.6 MG/DL (ref 0.2–0.7)
BUN BLDV-MCNC: 22 MG/DL (ref 8–23)
CALCIUM SERPL-MCNC: 9.3 MG/DL (ref 8.5–9.9)
CHLORIDE BLD-SCNC: 90 MEQ/L (ref 95–107)
CO2: 29 MEQ/L (ref 20–31)
CREAT SERPL-MCNC: 3.16 MG/DL (ref 0.5–0.9)
EKG ATRIAL RATE: 66 BPM
EKG P AXIS: 72 DEGREES
EKG P-R INTERVAL: 252 MS
EKG Q-T INTERVAL: 494 MS
EKG QRS DURATION: 140 MS
EKG QTC CALCULATION (BAZETT): 517 MS
EKG R AXIS: -51 DEGREES
EKG T AXIS: 68 DEGREES
EKG VENTRICULAR RATE: 66 BPM
EOSINOPHILS ABSOLUTE: 0.2 K/UL (ref 0–0.7)
EOSINOPHILS RELATIVE PERCENT: 2.3 %
GFR AFRICAN AMERICAN: 18
GFR NON-AFRICAN AMERICAN: 14.8
GLOBULIN: 3.4 G/DL (ref 2.3–3.5)
GLUCOSE BLD-MCNC: 159 MG/DL (ref 70–99)
HCT VFR BLD CALC: 27 % (ref 37–47)
HEMOGLOBIN: 9.6 G/DL (ref 12–16)
INR BLD: 1.1
LYMPHOCYTES ABSOLUTE: 1.7 K/UL (ref 1–4.8)
LYMPHOCYTES RELATIVE PERCENT: 19.6 %
MCH RBC QN AUTO: 31.4 PG (ref 27–31.3)
MCHC RBC AUTO-ENTMCNC: 35.5 % (ref 33–37)
MCV RBC AUTO: 88.5 FL (ref 82–100)
MONOCYTES ABSOLUTE: 0.5 K/UL (ref 0.2–0.8)
MONOCYTES RELATIVE PERCENT: 6.3 %
NEUTROPHILS ABSOLUTE: 6 K/UL (ref 1.4–6.5)
NEUTROPHILS RELATIVE PERCENT: 70.9 %
PDW BLD-RTO: 15.5 % (ref 11.5–14.5)
PLATELET # BLD: 220 K/UL (ref 130–400)
POTASSIUM SERPL-SCNC: 4 MEQ/L (ref 3.4–4.9)
PROTHROMBIN TIME: 14.4 SEC (ref 12.3–14.9)
RBC # BLD: 3.06 M/UL (ref 4.2–5.4)
SARS-COV-2, NAAT: DETECTED
SODIUM BLD-SCNC: 131 MEQ/L (ref 135–144)
TOTAL PROTEIN: 7.4 G/DL (ref 6.3–8)
WBC # BLD: 8.4 K/UL (ref 4.8–10.8)

## 2021-01-19 PROCEDURE — 72131 CT LUMBAR SPINE W/O DYE: CPT

## 2021-01-19 PROCEDURE — 85730 THROMBOPLASTIN TIME PARTIAL: CPT

## 2021-01-19 PROCEDURE — 70450 CT HEAD/BRAIN W/O DYE: CPT

## 2021-01-19 PROCEDURE — 71260 CT THORAX DX C+: CPT

## 2021-01-19 PROCEDURE — 36415 COLL VENOUS BLD VENIPUNCTURE: CPT

## 2021-01-19 PROCEDURE — G0378 HOSPITAL OBSERVATION PER HR: HCPCS

## 2021-01-19 PROCEDURE — 85025 COMPLETE CBC W/AUTO DIFF WBC: CPT

## 2021-01-19 PROCEDURE — 6360000004 HC RX CONTRAST MEDICATION: Performed by: PHYSICIAN ASSISTANT

## 2021-01-19 PROCEDURE — 93005 ELECTROCARDIOGRAM TRACING: CPT | Performed by: PHYSICIAN ASSISTANT

## 2021-01-19 PROCEDURE — 96372 THER/PROPH/DIAG INJ SC/IM: CPT

## 2021-01-19 PROCEDURE — 85610 PROTHROMBIN TIME: CPT

## 2021-01-19 PROCEDURE — 74177 CT ABD & PELVIS W/CONTRAST: CPT

## 2021-01-19 PROCEDURE — 80053 COMPREHEN METABOLIC PANEL: CPT

## 2021-01-19 PROCEDURE — U0002 COVID-19 LAB TEST NON-CDC: HCPCS

## 2021-01-19 PROCEDURE — 99285 EMERGENCY DEPT VISIT HI MDM: CPT

## 2021-01-19 PROCEDURE — 6360000002 HC RX W HCPCS: Performed by: INTERNAL MEDICINE

## 2021-01-19 PROCEDURE — 72125 CT NECK SPINE W/O DYE: CPT

## 2021-01-19 PROCEDURE — 72128 CT CHEST SPINE W/O DYE: CPT

## 2021-01-19 RX ORDER — ACETAMINOPHEN 650 MG/1
650 SUPPOSITORY RECTAL EVERY 6 HOURS PRN
Status: DISCONTINUED | OUTPATIENT
Start: 2021-01-19 | End: 2021-01-22 | Stop reason: HOSPADM

## 2021-01-19 RX ORDER — PROMETHAZINE HYDROCHLORIDE 12.5 MG/1
12.5 TABLET ORAL EVERY 6 HOURS PRN
Status: DISCONTINUED | OUTPATIENT
Start: 2021-01-19 | End: 2021-01-22 | Stop reason: HOSPADM

## 2021-01-19 RX ORDER — HEPARIN SODIUM 5000 [USP'U]/ML
5000 INJECTION, SOLUTION INTRAVENOUS; SUBCUTANEOUS EVERY 8 HOURS SCHEDULED
Status: DISCONTINUED | OUTPATIENT
Start: 2021-01-19 | End: 2021-01-22 | Stop reason: HOSPADM

## 2021-01-19 RX ORDER — ONDANSETRON 2 MG/ML
4 INJECTION INTRAMUSCULAR; INTRAVENOUS EVERY 6 HOURS PRN
Status: DISCONTINUED | OUTPATIENT
Start: 2021-01-19 | End: 2021-01-20 | Stop reason: SDUPTHER

## 2021-01-19 RX ORDER — ACETAMINOPHEN 325 MG/1
650 TABLET ORAL EVERY 6 HOURS PRN
Status: DISCONTINUED | OUTPATIENT
Start: 2021-01-19 | End: 2021-01-22 | Stop reason: HOSPADM

## 2021-01-19 RX ORDER — POLYETHYLENE GLYCOL 3350 17 G/17G
17 POWDER, FOR SOLUTION ORAL DAILY PRN
Status: DISCONTINUED | OUTPATIENT
Start: 2021-01-19 | End: 2021-01-22 | Stop reason: HOSPADM

## 2021-01-19 RX ADMIN — HEPARIN SODIUM 5000 UNITS: 5000 INJECTION INTRAVENOUS; SUBCUTANEOUS at 22:46

## 2021-01-19 RX ADMIN — IOPAMIDOL 100 ML: 612 INJECTION, SOLUTION INTRAVENOUS at 17:46

## 2021-01-19 ASSESSMENT — ENCOUNTER SYMPTOMS
ABDOMINAL PAIN: 0
FACIAL SWELLING: 0
BACK PAIN: 0
EYE DISCHARGE: 0
SHORTNESS OF BREATH: 1
PHOTOPHOBIA: 0
VOICE CHANGE: 0
SORE THROAT: 0
APNEA: 0
COUGH: 0
ABDOMINAL DISTENTION: 0
ANAL BLEEDING: 0

## 2021-01-19 ASSESSMENT — PAIN SCALES - GENERAL
PAINLEVEL_OUTOF10: 7
PAINLEVEL_OUTOF10: 7

## 2021-01-19 ASSESSMENT — PAIN DESCRIPTION - FREQUENCY
FREQUENCY: INTERMITTENT
FREQUENCY: CONTINUOUS

## 2021-01-19 NOTE — ED PROVIDER NOTES
asymmetry and headaches. Hematological: Does not bruise/bleed easily. Psychiatric/Behavioral: Negative for behavioral problems, self-injury and sleep disturbance. All other systems reviewed and are negative. Except as noted above the remainder of the review of systems was reviewed and negative. PAST MEDICAL HISTORY     Past Medical History:   Diagnosis Date    Anxiety     CAD S/P percutaneous coronary angioplasty 2015, 2018    stents per dr Vijay Carvajal    CHF (congestive heart failure) ([address]     CKD (chronic kidney disease) stage 4, GFR 15-29 ml/min ([address] 2/24/2018    CKD stage 4 due to type 2 diabetes mellitus ([address]     COPD (chronic obstructive pulmonary disease) ([address]     Diabetic nephropathy with proteinuria ([address] 2014    DJD (degenerative joint disease) of knee     Dr Aramis Parkinson GERD (gastroesophageal reflux disease)     Hemiparesis, left ([address] 2013    [facility] Assisted Living ([facility])    Hemodialysis patient [facility])     History of heart failure     History of seizures     History of type C viral hepatitis     HTN (hypertension)     Hyperlipidemia     Impaired mobility and activities of daily living     Mediastinal lymphadenopathy 2013    Bryant Miranda    Metabolic syndrome     Moderate persistent asthma without complication 5/66/9458    Neurogenic urinary incontinence 2013    Neuropathy in diabetes ([address]     Obesity (BMI 30-39. 9)     Recurrent UTI     S/P colonoscopy 2014    [facility], focal active colitis    Schizophrenia, paranoid, chronic ([address]     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 ([address]     Type 2 diabetes mellitus with renal manifestations, controlled ([address] 2015    Insulin dependent, Dr Garcia Urinary incontinence due to cognitive impairment 2013    strain: None    Food insecurity     Worry: None     Inability: None    Transportation needs     Medical: None     Non-medical: None   Tobacco Use    Smoking status: Passive Smoke Exposure - Never Smoker    Smokeless tobacco: Never Used   Substance and Sexual Activity    Alcohol use: No     Alcohol/week: 0.0 standard drinks    Drug use: No    Sexual activity: Not Currently   Lifestyle    Physical activity     Days per week: None     Minutes per session: None    Stress: None   Relationships    Social connections     Talks on phone: None     Gets together: None     Attends Rastafari service: None     Active member of club or organization: None     Attends meetings of clubs or organizations: None     Relationship status: None    Intimate partner violence     Fear of current or ex partner: None     Emotionally abused: None     Physically abused: None     Forced sexual activity: None   Other Topics Concern    None   Social History Narrative    Born in [place], one of 5    Twin sister Reyes, very ill in 2018, [place] [id number]    Moved to [facility], , 2 children, one son and one daughter    Worked at [employer], as a nurse's aide    Disabled due to mental illness    Lived at [facility], was discharged, returned to independent living in 2017 in the daughter's house and has adjusted well    One son and one daughter, live in the same house with patient, Shelly Moss pays the rent    [employer] reading (misteries)       SCREENINGS    [place] Coma Scale  Eye Opening: Spontaneous  Best Verbal Response: Oriented  Best Motor Response: Obeys commands  Michele Coma Scale Score: 15 @FLOW([id number])@      PHYSICAL EXAM    (up to 7 for level 4, 8 or more for level 5)     ED Triage Vitals [01/19/21 1553]   BP Temp Temp Source Pulse Resp SpO2 Height Weight   (!) 109/52 97.7 °F (36.5 °C) Oral 66 16 99 % 5' 7\" (1.702 m) 207 lb (93.9 kg)       Physical Exam  Vitals signs and nursing note reviewed.    Constitutional: General: She is not in acute distress. Appearance: She is well-developed. HENT:      Head: Normocephalic and atraumatic. Right Ear: Tympanic membrane normal.      Left Ear: Tympanic membrane normal.      Nose: Nose normal.      Mouth/Throat:      Mouth: Mucous membranes are moist.      Pharynx: No oropharyngeal exudate or posterior oropharyngeal erythema. Eyes:      General:         Right eye: No discharge. Left eye: No discharge. Extraocular Movements: Extraocular movements intact. Pupils: Pupils are equal, round, and reactive to light. Neck:      Musculoskeletal: Normal range of motion and neck supple. Cardiovascular:      Rate and Rhythm: Normal rate and regular rhythm. Heart sounds: Normal heart sounds. Pulmonary:      Effort: Pulmonary effort is normal. No respiratory distress. Breath sounds: Normal breath sounds. No stridor. Abdominal:      General: Bowel sounds are normal. There is no distension. Palpations: Abdomen is soft. Tenderness: There is abdominal tenderness. There is right CVA tenderness and left CVA tenderness. Musculoskeletal: Normal range of motion. Arms:    Skin:     General: Skin is warm. Findings: No erythema. Neurological:      Mental Status: She is alert and oriented to person, place, and time. Psychiatric:         Mood and Affect: Mood normal.         DIAGNOSTIC RESULTS     EKG: All EKG's are interpreted by the Emergency Department Physician who either signs or Co-signsthis chart in the absence of a cardiologist.    EKG sinus first-degree AV block rate 66 ND interval 252 ms  ms  ms.     RADIOLOGY:   Non-plain filmimages such as CT, Ultrasound and MRI are read by the radiologist. Plain radiographic images are visualized and preliminarily interpreted by the emergency physician with the below findings:    See rad report    Interpretation per the Radiologist below, if available at the time ofthis note:    CT Head WO Contrast   Final Result   FINAL IMPRESSION:      NO ACUTE INTRACRANIAL PROCESS, FRACTURE, OR EVIDENCE OF CERVICAL SPINE INJURY IDENTIFIED. MODERATELY EXTENSIVE CERVICAL SPONDYLOSIS, NOT SIGNIFICANTLY CHANGED FROM 10/8/2017. CT CERVICAL SPINE WO CONTRAST   Final Result   FINAL IMPRESSION:      NO ACUTE INTRACRANIAL PROCESS, FRACTURE, OR EVIDENCE OF CERVICAL SPINE INJURY IDENTIFIED. MODERATELY EXTENSIVE CERVICAL SPONDYLOSIS, NOT SIGNIFICANTLY CHANGED FROM 10/8/2017. CT THORACIC SPINE WO CONTRAST   Final Result   FINAL IMPRESSION:       NO ACUTE FRACTURES OR POSTTRAUMATIC COMPLICATION IDENTIFIED. BORDERLINE WALL THICKENING AND/OR FLUID AROUND AN OTHERWISE UNREMARKABLE. CHRONIC FINDINGS, AS NOTED. CT CHEST W CONTRAST   Final Result   FINAL IMPRESSION:       NO ACUTE FRACTURES OR POSTTRAUMATIC COMPLICATION IDENTIFIED. BORDERLINE WALL THICKENING AND/OR FLUID AROUND AN OTHERWISE UNREMARKABLE. CHRONIC FINDINGS, AS NOTED. CT ABDOMEN PELVIS W IV CONTRAST Additional Contrast? None   Final Result   FINAL IMPRESSION:       NO ACUTE FRACTURES OR POSTTRAUMATIC COMPLICATION IDENTIFIED. BORDERLINE WALL THICKENING AND/OR FLUID AROUND AN OTHERWISE UNREMARKABLE. CHRONIC FINDINGS, AS NOTED. CT LUMBAR SPINE WO CONTRAST   Final Result   FINAL IMPRESSION:       NO ACUTE FRACTURES OR POSTTRAUMATIC COMPLICATION IDENTIFIED. BORDERLINE WALL THICKENING AND/OR FLUID AROUND AN OTHERWISE UNREMARKABLE. CHRONIC FINDINGS, AS NOTED.                   ED BEDSIDE ULTRASOUND:   Performed by ED Physician - none    LABS:  Labs Reviewed   COMPREHENSIVE METABOLIC PANEL - Abnormal; Notable for the following components:       Result Value    Sodium 131 (*)     Chloride 90 (*)     Glucose 159 (*)     CREATININE 3.16 (*)     GFR Non- 14.8 (*)     GFR  18.0 (*)     Alkaline Phosphatase 169 (*)     All other components within normal limits   CBC WITH AUTO DIFFERENTIAL - Abnormal; Notable for the following components:    RBC 3.06 (*)     Hemoglobin 9.6 (*)     Hematocrit 27.0 (*)     MCH 31.4 (*)     RDW 15.5 (*)     All other components within normal limits   COVID-19 - Abnormal; Notable for the following components:    SARS-CoV-2, NAAT DETECTED (*)     All other components within normal limits    Narrative:     Rashawn Anika  [facility] tel. [phone],  Covid results called to and read back by Tasia Briggs, 01/19/2021 19:29, by  Jayleen Lindsay   APTT       All other labs were within normal range or not returned as of this dictation. EMERGENCY DEPARTMENT COURSE and DIFFERENTIAL DIAGNOSIS/MDM:   Vitals:    Vitals:    01/19/21 1553 01/19/21 1708 01/19/21 1834   BP: (!) 109/52 (!) 129/54 (!) 119/52   Pulse: 66 68 67   Resp: 16 14 14   Temp: 97.7 °F (36.5 °C)     TempSrc: Oral     SpO2: 99% 100% 100%   Weight: 207 lb (93.9 kg)     Height: 5' 7\" (1.702 m)              MDM  Number of Diagnoses or Management Options  Chest pain, unspecified type  Contusion of scalp, initial encounter  Falls frequently  Pain of upper abdomen  Diagnosis management comments: Discussed with . Nephrology he approves IV contrasted chest and abdomen CT if patient is admitted he will dialyze patient tomorrow if not he will dialyze patient on Thursday. Had frequent falls review of records notes that family would like to consider rehab is having difficulty caring for the patient patient would like to discuss admission for placement. Discussed with Dr. Jay Summerfield will admit for placement to rehab temporarily. Amount and/or Complexity of Data Reviewed  Clinical lab tests: reviewed and ordered  Tests in the radiology section of CPT®: ordered        CRITICAL CARE TIME     CONSULTS:  IP CONSULT TO NEPHROLOGY    PROCEDURES:  Unless otherwise noted below, none     Procedures    FINAL IMPRESSION      1. Falls frequently    2.  Contusion of scalp, initial encounter    3. Chest pain, unspecified type    4. Pain of upper abdomen    5. Stage 5 chronic kidney disease on chronic dialysis (HCC)          DISPOSITION/PLAN   DISPOSITION        PATIENT REFERRED TO:  No follow-up provider specified.     DISCHARGE MEDICATIONS:  New Prescriptions    No medications on file          (Please note that portions of this note were completed with a voice recognition program.  Efforts were made to edit the dictations but occasionally words are mis-transcribed.)    Juan Andrews PA-C (electronically signed)  Attending Emergency Physician       Juan Andrews PA-C  01/19/21 2005

## 2021-01-19 NOTE — ED TRIAGE NOTES
Pt [facility] ED [facility]. [facility] reports they were called to patient dialysis center due to chest pain. EMS reported that pt stated that she fell twice today 4:30am and 8am.  Patient reports during triage that she came in today because she's weak that her leg   She states that she just gets out of breath all the time   Pt is currently stating at 100% SPO2 on the monitor   EMS states the same O2 level   When patient was asked about the chest pain EMS was called for she said \"oh I do have some\" pain is at a 7.   Pt vitals stable  MARCELO Laws at bedside

## 2021-01-19 NOTE — ED NOTES
Bed: 28  Expected date:   Expected time:   Means of arrival:   Comments:  61f chest pain after falling this AM, 127/61, 82, 100RA     April MARIAM Costello RN  01/19/21 1910

## 2021-01-20 LAB
ANION GAP SERPL CALCULATED.3IONS-SCNC: 14 MEQ/L (ref 9–15)
BASOPHILS ABSOLUTE: 0 K/UL (ref 0–0.2)
BASOPHILS RELATIVE PERCENT: 0.6 %
BUN BLDV-MCNC: 29 MG/DL (ref 8–23)
CALCIUM SERPL-MCNC: 9.6 MG/DL (ref 8.5–9.9)
CHLORIDE BLD-SCNC: 91 MEQ/L (ref 95–107)
CO2: 27 MEQ/L (ref 20–31)
CREAT SERPL-MCNC: 4.02 MG/DL (ref 0.5–0.9)
D DIMER: 2.57 MG/L FEU (ref 0–0.5)
EOSINOPHILS ABSOLUTE: 0.2 K/UL (ref 0–0.7)
EOSINOPHILS RELATIVE PERCENT: 3.7 %
GFR AFRICAN AMERICAN: 13.6
GFR NON-AFRICAN AMERICAN: 11.2
GLUCOSE BLD-MCNC: 262 MG/DL (ref 60–115)
GLUCOSE BLD-MCNC: 268 MG/DL (ref 70–99)
GLUCOSE BLD-MCNC: 276 MG/DL (ref 60–115)
GLUCOSE BLD-MCNC: 288 MG/DL (ref 60–115)
GLUCOSE BLD-MCNC: 303 MG/DL (ref 60–115)
GLUCOSE BLD-MCNC: 316 MG/DL (ref 60–115)
HBA1C MFR BLD: 7.3 % (ref 4.8–5.9)
HCT VFR BLD CALC: 25.1 % (ref 37–47)
HEMOGLOBIN: 8.7 G/DL (ref 12–16)
LYMPHOCYTES ABSOLUTE: 1 K/UL (ref 1–4.8)
LYMPHOCYTES RELATIVE PERCENT: 18 %
MAGNESIUM: 2.3 MG/DL (ref 1.7–2.4)
MCH RBC QN AUTO: 31.3 PG (ref 27–31.3)
MCHC RBC AUTO-ENTMCNC: 34.9 % (ref 33–37)
MCV RBC AUTO: 89.8 FL (ref 82–100)
MONOCYTES ABSOLUTE: 0.6 K/UL (ref 0.2–0.8)
MONOCYTES RELATIVE PERCENT: 9.8 %
NEUTROPHILS ABSOLUTE: 3.8 K/UL (ref 1.4–6.5)
NEUTROPHILS RELATIVE PERCENT: 67.9 %
PDW BLD-RTO: 16 % (ref 11.5–14.5)
PERFORMED ON: ABNORMAL
PHOSPHORUS: 4.6 MG/DL (ref 2.3–4.8)
PLATELET # BLD: 175 K/UL (ref 130–400)
POTASSIUM REFLEX MAGNESIUM: 4 MEQ/L (ref 3.4–4.9)
RBC # BLD: 2.79 M/UL (ref 4.2–5.4)
SODIUM BLD-SCNC: 132 MEQ/L (ref 135–144)
WBC # BLD: 5.6 K/UL (ref 4.8–10.8)

## 2021-01-20 PROCEDURE — 36415 COLL VENOUS BLD VENIPUNCTURE: CPT

## 2021-01-20 PROCEDURE — 93010 ELECTROCARDIOGRAM REPORT: CPT | Performed by: INTERNAL MEDICINE

## 2021-01-20 PROCEDURE — 94761 N-INVAS EAR/PLS OXIMETRY MLT: CPT

## 2021-01-20 PROCEDURE — 96372 THER/PROPH/DIAG INJ SC/IM: CPT

## 2021-01-20 PROCEDURE — 83036 HEMOGLOBIN GLYCOSYLATED A1C: CPT

## 2021-01-20 PROCEDURE — 85025 COMPLETE CBC W/AUTO DIFF WBC: CPT

## 2021-01-20 PROCEDURE — 83735 ASSAY OF MAGNESIUM: CPT

## 2021-01-20 PROCEDURE — 80048 BASIC METABOLIC PNL TOTAL CA: CPT

## 2021-01-20 PROCEDURE — 97166 OT EVAL MOD COMPLEX 45 MIN: CPT

## 2021-01-20 PROCEDURE — 6360000002 HC RX W HCPCS: Performed by: INTERNAL MEDICINE

## 2021-01-20 PROCEDURE — 6370000000 HC RX 637 (ALT 250 FOR IP): Performed by: INTERNAL MEDICINE

## 2021-01-20 PROCEDURE — 85379 FIBRIN DEGRADATION QUANT: CPT

## 2021-01-20 PROCEDURE — 94640 AIRWAY INHALATION TREATMENT: CPT

## 2021-01-20 PROCEDURE — G0378 HOSPITAL OBSERVATION PER HR: HCPCS

## 2021-01-20 PROCEDURE — 94664 DEMO&/EVAL PT USE INHALER: CPT

## 2021-01-20 PROCEDURE — 84100 ASSAY OF PHOSPHORUS: CPT

## 2021-01-20 PROCEDURE — 2500000003 HC RX 250 WO HCPCS: Performed by: INTERNAL MEDICINE

## 2021-01-20 RX ORDER — ONDANSETRON 2 MG/ML
4 INJECTION INTRAMUSCULAR; INTRAVENOUS EVERY 6 HOURS PRN
Status: DISCONTINUED | OUTPATIENT
Start: 2021-01-20 | End: 2021-01-22 | Stop reason: HOSPADM

## 2021-01-20 RX ORDER — ASCORBIC ACID 500 MG
500 TABLET ORAL 2 TIMES DAILY
Status: DISCONTINUED | OUTPATIENT
Start: 2021-01-20 | End: 2021-01-22 | Stop reason: HOSPADM

## 2021-01-20 RX ORDER — DEXTROSE MONOHYDRATE 50 MG/ML
100 INJECTION, SOLUTION INTRAVENOUS PRN
Status: DISCONTINUED | OUTPATIENT
Start: 2021-01-20 | End: 2021-01-22 | Stop reason: HOSPADM

## 2021-01-20 RX ORDER — PREGABALIN 75 MG/1
75 CAPSULE ORAL DAILY
Status: DISCONTINUED | OUTPATIENT
Start: 2021-01-20 | End: 2021-01-22 | Stop reason: HOSPADM

## 2021-01-20 RX ORDER — ASPIRIN 81 MG/1
81 TABLET ORAL DAILY
Status: DISCONTINUED | OUTPATIENT
Start: 2021-01-20 | End: 2021-01-22 | Stop reason: HOSPADM

## 2021-01-20 RX ORDER — NICOTINE POLACRILEX 4 MG
15 LOZENGE BUCCAL PRN
Status: DISCONTINUED | OUTPATIENT
Start: 2021-01-20 | End: 2021-01-22 | Stop reason: HOSPADM

## 2021-01-20 RX ORDER — FLUTICASONE PROPIONATE 110 UG/1
1 AEROSOL, METERED RESPIRATORY (INHALATION) 2 TIMES DAILY
Status: DISCONTINUED | OUTPATIENT
Start: 2021-01-20 | End: 2021-01-22 | Stop reason: HOSPADM

## 2021-01-20 RX ORDER — ACETAMINOPHEN 80 MG
TABLET,CHEWABLE ORAL ONCE
Status: DISCONTINUED | OUTPATIENT
Start: 2021-01-20 | End: 2021-01-22 | Stop reason: HOSPADM

## 2021-01-20 RX ORDER — ARIPIPRAZOLE 5 MG/1
5 TABLET ORAL DAILY
Status: DISCONTINUED | OUTPATIENT
Start: 2021-01-20 | End: 2021-01-22 | Stop reason: HOSPADM

## 2021-01-20 RX ORDER — CHOLESTYRAMINE LIGHT 4 G/5.7G
4 POWDER, FOR SUSPENSION ORAL 2 TIMES DAILY
Status: DISCONTINUED | OUTPATIENT
Start: 2021-01-20 | End: 2021-01-22 | Stop reason: HOSPADM

## 2021-01-20 RX ORDER — DEXTROSE MONOHYDRATE 25 G/50ML
12.5 INJECTION, SOLUTION INTRAVENOUS PRN
Status: DISCONTINUED | OUTPATIENT
Start: 2021-01-20 | End: 2021-01-22 | Stop reason: HOSPADM

## 2021-01-20 RX ORDER — SODIUM CHLORIDE 450 MG/100ML
INJECTION, SOLUTION INTRAVENOUS CONTINUOUS
Status: DISCONTINUED | OUTPATIENT
Start: 2021-01-20 | End: 2021-01-22 | Stop reason: HOSPADM

## 2021-01-20 RX ORDER — ATORVASTATIN CALCIUM 40 MG/1
40 TABLET, FILM COATED ORAL DAILY
Status: DISCONTINUED | OUTPATIENT
Start: 2021-01-20 | End: 2021-01-22 | Stop reason: HOSPADM

## 2021-01-20 RX ORDER — LANOLIN ALCOHOL/MO/W.PET/CERES
400 CREAM (GRAM) TOPICAL DAILY
Status: DISCONTINUED | OUTPATIENT
Start: 2021-01-20 | End: 2021-01-22 | Stop reason: HOSPADM

## 2021-01-20 RX ORDER — RANOLAZINE 500 MG/1
500 TABLET, EXTENDED RELEASE ORAL 2 TIMES DAILY
Status: DISCONTINUED | OUTPATIENT
Start: 2021-01-20 | End: 2021-01-22 | Stop reason: HOSPADM

## 2021-01-20 RX ORDER — AMLODIPINE BESYLATE 10 MG/1
10 TABLET ORAL DAILY
Status: DISCONTINUED | OUTPATIENT
Start: 2021-01-20 | End: 2021-01-22 | Stop reason: HOSPADM

## 2021-01-20 RX ORDER — ZINC SULFATE 50(220)MG
50 CAPSULE ORAL DAILY
Status: DISCONTINUED | OUTPATIENT
Start: 2021-01-20 | End: 2021-01-22 | Stop reason: HOSPADM

## 2021-01-20 RX ORDER — PANTOPRAZOLE SODIUM 20 MG/1
20 TABLET, DELAYED RELEASE ORAL DAILY
Status: DISCONTINUED | OUTPATIENT
Start: 2021-01-20 | End: 2021-01-22 | Stop reason: HOSPADM

## 2021-01-20 RX ORDER — INSULIN GLARGINE 100 [IU]/ML
45 INJECTION, SOLUTION SUBCUTANEOUS NIGHTLY
Status: DISCONTINUED | OUTPATIENT
Start: 2021-01-20 | End: 2021-01-22 | Stop reason: HOSPADM

## 2021-01-20 RX ORDER — IPRATROPIUM BROMIDE AND ALBUTEROL SULFATE 2.5; .5 MG/3ML; MG/3ML
3 SOLUTION RESPIRATORY (INHALATION) EVERY 4 HOURS PRN
Status: DISCONTINUED | OUTPATIENT
Start: 2021-01-20 | End: 2021-01-22 | Stop reason: HOSPADM

## 2021-01-20 RX ORDER — ISOSORBIDE MONONITRATE 60 MG/1
60 TABLET, EXTENDED RELEASE ORAL DAILY
Status: DISCONTINUED | OUTPATIENT
Start: 2021-01-20 | End: 2021-01-22 | Stop reason: HOSPADM

## 2021-01-20 RX ADMIN — INSULIN LISPRO 8 UNITS: 100 INJECTION, SOLUTION INTRAVENOUS; SUBCUTANEOUS at 11:55

## 2021-01-20 RX ADMIN — Medication 400 MG: at 09:00

## 2021-01-20 RX ADMIN — HEPARIN SODIUM 5000 UNITS: 5000 INJECTION INTRAVENOUS; SUBCUTANEOUS at 06:10

## 2021-01-20 RX ADMIN — OXYCODONE HYDROCHLORIDE AND ACETAMINOPHEN 500 MG: 500 TABLET ORAL at 21:48

## 2021-01-20 RX ADMIN — TICAGRELOR 90 MG: 90 TABLET ORAL at 09:00

## 2021-01-20 RX ADMIN — HEPARIN SODIUM 5000 UNITS: 5000 INJECTION INTRAVENOUS; SUBCUTANEOUS at 14:13

## 2021-01-20 RX ADMIN — PANTOPRAZOLE SODIUM 20 MG: 20 TABLET, DELAYED RELEASE ORAL at 09:00

## 2021-01-20 RX ADMIN — MICONAZOLE NITRATE: 2 POWDER TOPICAL at 09:02

## 2021-01-20 RX ADMIN — CHOLESTYRAMINE 4 G: 4 POWDER, FOR SUSPENSION ORAL at 21:48

## 2021-01-20 RX ADMIN — ARIPIPRAZOLE 5 MG: 5 TABLET ORAL at 09:01

## 2021-01-20 RX ADMIN — METOPROLOL TARTRATE 12.5 MG: 25 TABLET, FILM COATED ORAL at 21:48

## 2021-01-20 RX ADMIN — HEPARIN SODIUM 5000 UNITS: 5000 INJECTION INTRAVENOUS; SUBCUTANEOUS at 22:13

## 2021-01-20 RX ADMIN — INSULIN LISPRO 8 UNITS: 100 INJECTION, SOLUTION INTRAVENOUS; SUBCUTANEOUS at 17:00

## 2021-01-20 RX ADMIN — RANOLAZINE 500 MG: 500 TABLET, FILM COATED, EXTENDED RELEASE ORAL at 21:48

## 2021-01-20 RX ADMIN — FLUTICASONE PROPIONATE 1 PUFF: 110 AEROSOL, METERED RESPIRATORY (INHALATION) at 08:57

## 2021-01-20 RX ADMIN — TICAGRELOR 90 MG: 90 TABLET ORAL at 21:48

## 2021-01-20 RX ADMIN — MICONAZOLE NITRATE: 2 POWDER TOPICAL at 21:50

## 2021-01-20 RX ADMIN — SERTRALINE 50 MG: 50 TABLET, FILM COATED ORAL at 09:01

## 2021-01-20 RX ADMIN — CHOLESTYRAMINE 4 G: 4 POWDER, FOR SUSPENSION ORAL at 15:42

## 2021-01-20 RX ADMIN — INSULIN LISPRO 8 UNITS: 100 INJECTION, SOLUTION INTRAVENOUS; SUBCUTANEOUS at 08:00

## 2021-01-20 RX ADMIN — RANOLAZINE 500 MG: 500 TABLET, FILM COATED, EXTENDED RELEASE ORAL at 09:01

## 2021-01-20 RX ADMIN — METOPROLOL TARTRATE 12.5 MG: 25 TABLET, FILM COATED ORAL at 09:01

## 2021-01-20 RX ADMIN — ISOSORBIDE MONONITRATE 60 MG: 60 TABLET, EXTENDED RELEASE ORAL at 09:01

## 2021-01-20 RX ADMIN — ZINC SULFATE 220 MG (50 MG) CAPSULE 50 MG: CAPSULE at 15:42

## 2021-01-20 RX ADMIN — ATORVASTATIN CALCIUM 40 MG: 40 TABLET, FILM COATED ORAL at 09:01

## 2021-01-20 RX ADMIN — ASPIRIN 81 MG: 81 TABLET, COATED ORAL at 09:00

## 2021-01-20 RX ADMIN — INSULIN GLARGINE 45 UNITS: 100 INJECTION, SOLUTION SUBCUTANEOUS at 21:48

## 2021-01-20 RX ADMIN — PREGABALIN 75 MG: 75 CAPSULE ORAL at 09:00

## 2021-01-20 NOTE — ACP (ADVANCE CARE PLANNING)
Advance Care Planning     Advance Care Planning Activator (Inpatient)  Conversation Note      Date of ACP Conversation: 1/19/2021    Conversation Conducted with: Patient with Decision Making Capacity    ACP Activator: [address] makes decisions on behalf of the incapacitated patient: Decision Maker is asked to consider and make decisions based on patient values, known preferences, or best interests. Health Care Decision Maker:     Current Designated Health Care Decision Maker:   Primary Decision Maker: Gerda Baltazar - Child - [phone]  (If there is a [address] named in the [address]\" box in the ACP activity, but it is not visible above, be sure to open that field and then select the health care decision maker relationship (ie \"primary\") in the blank space to the right of the name.) Validate  this information as still accurate & up-to-date; edit [place] field as needed.)    Note: Assess and validate information in current ACP documents, as indicated. If no Decision Maker listed above or available through scanned documents, then:    If no Authorized Decision Maker has previously been identified, then patient chooses [place]:  \"Who would you like to name as your primary health care decision-maker? \"               Name: Gerda Baltazar        Relationship: daughter          Phone number: [phone]  Ismael Banegas this person be reached easily? \" Yes     Note: If the relationship of these Decision-Makers to the patient does NOT follow your state's Next of Kin hierarchy, recommend that patient complete ACP document that meets state-specific requirements to allow them to act on the patient's behalf when appropriate. Care Preferences    Ventilation:   \"If you were in your present state of health and suddenly became very ill and were unable to breathe on your own, what would your preference be about the use of a ventilator (breathing machine) if it were available to you? \"      Would the patient desire the use of ventilator (breathing machine)?: yes    \"If your health worsens and it becomes clear that your chance of recovery is unlikely, what would your preference be about the use of a ventilator (breathing machine) if it were available to you? \"     Would the patient desire the use of ventilator (breathing machine)?: Yes      Resuscitation  \"CPR works best to restart the heart when there is a sudden event, like a heart attack, in someone who is otherwise healthy. Unfortunately, CPR does not typically restart the heart for people who have serious health conditions or who are very sick. \"    \"In the event your heart stopped as a result of an underlying serious health condition, would you want attempts to be made to restart your heart (answer \"yes\" for attempt to resuscitate) or would you prefer a natural death (answer \"no\" for do not attempt to resuscitate)? \" yes      NOTE: If the patient has a valid advance directive AND now provides care preference(s) that are inconsistent with that prior directive, advise the patient to consider either: creating a new advance directive that complies with state-specific requirements; or, if that is not possible, orally revoking that prior directive in accordance with state-specific requirements, which must be documented in the EHR. [x] Yes   [] No   Educated Patient / Kalpana Craw regarding differences between Advance Directives and portable DNR orders.     Length of ACP Conversation in minutes:  10    Conversation Outcomes:  [x] ACP discussion completed  [] Existing advance directive reviewed with patient; no changes to patient's previously recorded wishes  [] New Advance Directive completed  [] Portable Do Not Rescitate prepared for Provider review and signature  [] POLST/POST/MOLST/MOST prepared for Provider review and signature      Follow-up plan:    [] Schedule follow-up conversation to continue planning  [] Referred individual to Provider for additional questions/concerns   [] Advised patient/agent/surrogate to review completed ACP document and update if needed with changes in condition, patient preferences or care setting    [x] This note routed to one or more involved healthcare providers

## 2021-01-20 NOTE — TELEPHONE ENCOUNTER
Dr. Lopez Smaller  Please see LSW note below, looks like they are planning placement    Social work note: Called pt to discuss DC planning. Per pt she is requesting a SNF. She was specific in her choice asking for [facility]. Referral faxed to Rivera Vicente, liaison for [facility]. Pt requested that LSW call her dtr Zeinab Castillo.  Electronically signed by HIPOLITO Shin on 1/20/2021 at 10:27 AM    Thank you, if she goes back home and you need me to enroll, just let me know    Thank You     Del

## 2021-01-20 NOTE — PROGRESS NOTES
Pulmonary Disorder  (acute or chronic)  []   Severe or Chronic w/ Exacerbation  []     Surgical Status No [x]   Surgeries     General []   Surgery Lower []   Abdominal Thoracic or []   Upper Abdominal Thoracic with  PulmonaryDisorder  []     Chest X-ray Clear/Not  Ordered     []  Chronic Changes  Results Pending  [x]  Infiltrates, atelectasis, pleural effusion, or edema  []  Infiltrates in more than one lobe []  Infiltrate + Atelectasis, &/or pleural effusion  []    Respiratory Pattern Regular,  RR = 12-20 [x]  Increased,  RR = 21-25 []  GONZALEZ, irregular,  or RR = 26-30 []  Decreased FEV1  or RR = 31-35 []  Severe SOB, use  of accessory muscles, or RR ? 35  []    Mental Status Alert, oriented,  Cooperative [x]  Confused but Follows commands []  Lethargic or unable to follow commands []  Obtunded  []  Comatose  []    Breath Sounds Clear to  auscultation  []  Decreased unilaterally or  in bases only [x]  Decreased  bilaterally  []  Crackles or intermittent wheezes []  Wheezes []    Cough Strong, Spontan., & nonproductive [x]  Strong,  spontaneous, &  productive []  Weak,  Nonproductive []  Weak, productive or  with wheezes []  No spontaneous  cough or may require suctioning []    Level of Activity Ambulatory []  Ambulatory w/ Assist  [x]  Non-ambulatory []  Paraplegic []  Quadriplegic []    Total    Score:____3___     Triage Score:__5______      Tri       Triage:     1. (>20) Freq: Q3    2. (16-20) Freq: Q4   3. (11-15) Freq: QID & Albuterol Q2 PRN    4. (6-10) Freq: TID & Albuterol Q2 PRN    5. (0-5) Freq Q4prn

## 2021-01-20 NOTE — CARE COORDINATION
Social work note: Called pt to discuss DC planning. Per pt she is requesting a SNF. She was specific in her choice asking for [facility]. Referral faxed to [facility], liaison for [facility]. Pt requested that LSW call her dtr Zeinab Castillo.  Electronically signed by HIPOLITO Shin on 1/20/2021 at 10:27 AM

## 2021-01-20 NOTE — PROGRESS NOTES
[facility] OCCUPATIONAL THERAPY EVALUATION - ACUTE     NAME: Corrine Mendoza  : 1960 (89 y.o.)  MRN: [id number]  CODE STATUS: Full Code  Room: [place] [id number]    Date of Service: 2021    Patient Diagnosis(es): Falls frequently [R29.6]   Chief Complaint   Patient presents with    Extremity Weakness     Patient Active Problem List    Diagnosis Date Noted    Falls frequently 2021    Post PTCA     COVID-19 2020    Thrush 2020    Moderate persistent asthma without complication     Weakness 2020    Difficulty in walking 2020    ESRD (end stage renal disease) on [facility] ([facility] 2020    Renal failure 2020    Recurrent falls 2020    Chest pain 2020    Edema 2019    Closed supracondylar fracture of right humerus 2019    Other chronic pain 2019    Palliative care patient 2019    Class 2 severe obesity with serious comorbidity and body mass index (BMI) of 36.0 to 36.9 in adult [facility]) 2019    Sleep apnea 2019    Pulmonary hypertension ([address]     Diastolic congestive heart failure ([address] 10/04/2019    Uncontrolled type 2 diabetes mellitus with hyperglycemia (HCC)     Shortness of breath 10/02/2019    Tardive dyskinesia 2019    Chronic painful diabetic neuropathy (HCC)     Angina, class II ([address]     Stented coronary artery-plan is to stay on Plavix indefinately per Dr Bird Joseph 2016    History of seizures     Urinary incontinence due to cognitive impairment     Essential hypertension     History of type C viral hepatitis     Diabetic nephropathy with proteinuria ([address]     Incontinence of urine 2014    Vitamin D insufficiency 2014    Vitamin B 12 deficiency 2013    Cerebral microvascular disease 2013    Hemiparesis, left ([address] 2013    Coronary artery disease involving native heart with angina pectoris (HCC)     Schizophrenia, paranoid, chronic     Metabolic syndrome     Mixed hyperlipidemia 2010    Other hammer toe (acquired) 2007        Past Medical History:   Diagnosis Date    Anxiety     CAD S/P percutaneous coronary angioplasty ,     stents per dr Maria Ines Nye CHF (congestive heart failure) ([facility]     CKD (chronic kidney disease) stage 4, GFR 15-29 ml/min ([address] 2018    CKD stage 4 due to type 2 diabetes mellitus ([address]     COPD (chronic obstructive pulmonary disease) ([address]     Diabetic nephropathy with proteinuria ([address]     DJD (degenerative joint disease) of knee     Dr Aramis Parkinson GERD (gastroesophageal reflux disease)     Hemiparesis, left ([address] 2013    [facility] Assisted Living ([facility])    Hemod[facility] patient [facility])     History of heart failure     History of seizures     History of type C viral hepatitis     HTN (hypertension)     Hyperlipidemia     Impaired mobility and activities of daily living     Mediastinal lymphadenopathy     Bryant Miranda Campbell    Metabolic syndrome     Moderate persistent asthma without complication 9728    Neurogenic urinary incontinence 2013    Neuropathy in diabetes ([address]     Obesity (BMI 30-39. 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 Keith Singh      x1 Dr. Malathi Trotter,  in the [facility]    Vision and Hearing Status:  Vision  Vision: Impaired  Vision Exceptions: Wears glasses for reading  Hearing  Hearing: Within functional limits     ROM:   LUE AROM (degrees)  LUE General AROM: limited shoulder flexion  Left Hand AROM (degrees)  Left Hand AROM: WFL  RUE AROM (degrees)  RUE General AROM: limited shoulder flexion  Right Hand AROM (degrees)  Right Hand AROM: WFL    Strength:  LUE Strength  L Hand General: 4/5  LUE Strength Comment: limited shoulder  RUE Strength  R Hand General: 4/5  RUE Strength Comment: limited shoulder    Coordination, Tone, Quality of Movement: Tone RUE  RUE Tone: Normotonic  Tone LUE  LUE Tone: Normotonic  Coordination  Movements Are Fluid And Coordinated: Yes    Hand Dominance:  Hand Dominance  Hand Dominance: Left    ADL Status:  ADL  Feeding: Unable to assess(comment)  Grooming: Stand by assistance  UE Bathing: Stand by assistance  LE Bathing:  Moderate assistance  UE Dressing: Stand by assistance  LE Dressing: Maximum assistance  Toileting: Unable to assess(comment)          Therapy key for assistance levels -   Independent = Pt. is able to perform task with no assistance but may require a device   Stand by assistance = Pt. does not perform task at an independent level but does not need physical assistance, requires verbal cues  Minimal, Moderate, Maximal Assistance = Pt. requires physical assistance (25%, 50%, 75% assist from helper) for task but is able to actively participate in task   Dependent = Pt. requires total assistance with task and is not able to actively participate with task completion     Functional Mobility:     Transfers  Sit to stand: Minimal assistance  Stand to sit: Minimal assistance    Bed Mobility  Bed mobility  Supine to Sit: Minimal assistance  Sit to Supine: Minimal assistance    Seated and Standing Balance:  Balance  Sitting Balance: Supervision  Standing Balance: Minimal assistance    Functional Endurance:  Activity Tolerance  Activity signed by:    ELDA Mc/L  4/96/4697, 4:17 PM Electronically signed by ELDA Mc/L on 7/29/71 at 4:17 PM EST

## 2021-01-20 NOTE — TELEPHONE ENCOUNTER
Good Morning Dr.De Rey Carmichael     I will work with inpatient LSW and coordinator to assist this pt  Will update you as I know more    Thank You  Del

## 2021-01-20 NOTE — CONSULTS
Renal consult dictated    Diarrhea  ESRDx T-Th-Sat  Diarrhea viral ?  Left arm fistula  AnemiaHx Seizures  Hx Hepatitis-c  Prior Covid now dialysis at routine clinic  Schzophrenia hx  DM type-2  OHDX DHF  Plan IV  Fluids d/t diarrhea  Dialysis tomorrow

## 2021-01-20 NOTE — CONSULTS
[facility]  Consult Note           Reason for Consult:  ESRD management  Requesting Physician:  Dr. Nathaniel Turner     Chief Complaint:  weakness  History Obtained From:  patient, electronic medical record    History of Present Ilness:    56 y. o. year old female with history s/f ESRd on [facility] TTS, CAD s/p DEWEY, CHF, T2DM, GERD, asthma and schizophrenia who presented for weakness, fatigue, GONZALEZ and frequent falls. Was recently admitted for chest pain. Which she also c/o this time as well. On presentation pt hemodynamically stable. Had HD yesterday prior to coming in. Remains positive for COVID. CT imaging neg. Past Medical History:        Diagnosis Date    Anxiety     CAD S/P percutaneous coronary angioplasty 2015, 2018    stents per dr Shahrzad Arzola    [facility]estive [facility]     CKD (chronic kidney disease) stage 4, GFR 15-29 ml/min ([address] 2/24/2018    CKD stage 4 due to type 2 diabetes mellitus ([address]     COPD (chronic obstructive pulmonary disease) ([address]     Diabetic nephropathy with proteinuria ([address] 2014    DJD (degenerative joint disease) of knee     Dr Hopkins Miss GERD (gastroesophageal reflux disease)     Hemiparesis, left ([facility] 2013    entered Assisted Living ([facility])    Hemodialysis patient [facility])     History of heart failure     History of seizures     History of type C viral hepatitis     HTN (hypertension)     Hyperlipidemia     Impaired mobility and activities of daily living     Mediastinal lymphadenopathy 2013    Jorge Valentino    Metabolic syndrome     Moderate persistent asthma without complication 1/74/7019    Neurogenic urinary incontinence 2013    Neuropathy in diabetes ([address]     Obesity (BMI 30-39. 9)     Recurrent UTI     S/P colonoscopy 2014    [facility], focal active colitis    Schizophrenia, paranoid, chronic ([address]     [facility]   [employer] vessel disease, cerebrovascular 2013    Status post total knee replacement, right  BRILINTA 90 MG TABS tablet TAKE 1 TABLET BY MOUTH 2 TIMES DAILY 60 tablet 11    pantoprazole (PROTONIX) 20 MG tablet TAKE 1 TABLET BY MOUTH DAILY 90 tablet 3    B Complex-C-Folic Acid (NEPHRO VITAMINS) 0.8 MG TABS Take by mouth daily       sertraline (ZOLOFT) 50 MG tablet TAKE 1 TABLET BY MOUTH DAILY 90 tablet 3    isosorbide mononitrate (IMDUR) 60 MG extended release tablet Take 1 tablet by mouth daily 90 tablet 3    amLODIPine (NORVASC) 10 MG tablet TAKE 1 TABLET BY MOUTH DAILY 90 tablet 1    aspirin 81 MG tablet Take 1 tablet by mouth daily 90 tablet 3    atorvastatin (LIPITOR) 40 MG tablet Take 1 tablet by mouth daily 90 tablet 3    melatonin 3 MG TABS tablet TAKE 1 TABLET BY MOUTH EVERY NIGHT AS NEEDED FOR INSOMNIA 180 tablet 4    nitroGLYCERIN (NITROSTAT) 0.4 MG SL tablet Place 1 tablet under the tongue every 5 minutes as needed for Chest pain      magnesium oxide (MAG-OX) 400 MG tablet Take 400 mg by mouth daily      blood glucose test strips (FREESTYLE LITE) strip 1 each by Does not apply route 4 times daily (before meals and nightly) As needed. 200 strip 5    Misc.  Devices (BARIATRIC ROLLATOR) MISC Rolllator with a basket 1 each 0    Blood Glucose Monitoring Suppl (FREESTYLE LITE) CORETTA 1 Device by Does not apply route daily as needed (Diabetes) Use freestyle meter to test blood sugar as needed 1 Device 0    triamcinolone (KENALOG) 0.1 % cream APPLY TO AFFECTED AREA TWICE DAILY AS DIRECTED 80 g 1    nystatin (NYAMYC) 108589 UNIT/GM powder APPLY TO ABDOMINAL FOLDS EVERY 12 HOURS AS NEEDED 60 g 2    albuterol sulfate HFA (VENTOLIN HFA) 108 (90 Base) MCG/ACT inhaler Inhale 2 puffs into the lungs every 6 hours as needed for Wheezing      fluticasone (FLOVENT HFA) 110 MCG/ACT inhaler Inhale 2 puffs into the lungs 2 times daily (Patient taking differently: Inhale 2 puffs into the lungs 2 times daily as needed ) 1 Inhaler 12  Misc. Devices [facility]) MISC To use with transport outside of the house.  1 each 0    ipratropium-albuterol (DUONEB) 0.5-2.5 (3) MG/3ML SOLN nebulizer solution Inhale 3 mLs into the lungs every 4 hours as needed for Shortness of Breath 360 mL 0    SURE COMFORT PEN NEEDLES 30G X 8 MM MISC USE AS DIRECTED FIVE TIMES A  each 10       Allergies:  Codeine and Oxycontin [oxycodone hcl]    Social History:    Social History     Socioeconomic History    Marital status:      Spouse name: Not on file    Number of children: 2    Years of education: Not on file    Highest education level: Not on file   Occupational History    Occupation: disabled   Social Needs    Financial resource strain: Not on file    Food insecurity     Worry: Not on file     Inability: Not on file   [employer] needs     Medical: Not on file     Non-medical: Not on file   Tobacco Use    Smoking status: Passive Smoke Exposure - Never Smoker    Smokeless tobacco: Never Used   Substance and Sexual Activity    Alcohol use: No     Alcohol/week: 0.0 standard drinks    Drug use: No    Sexual activity: Not Currently   Lifestyle    Physical activity     Days per week: Not on file     Minutes per session: Not on file    Stress: Not on file   Relationships    Social connections     Talks on phone: Not on file     Gets together: Not on file     Attends [employer] service: Not on file     Active member of club or organization: Not on file     Attends meetings of clubs or organizations: Not on file     Relationship status: Not on file    Intimate partner violence     Fear of current or ex partner: Not on file     Emotionally abused: Not on file     Physically abused: Not on file     Forced sexual activity: Not on file   Other Topics Concern    Not on file   Social History Narrative    Born in [place], one of 5    Twin sister Reyes, very ill in 2018, [place] [address] Moved to [facility], , 2 children, one son and one daughter    Worked at [employer], as a nurse's aide    Disabled due to mental illness    Lived at [facility], was discharged, returned to independent living in 2017 in the daughter's house and has adjusted well    One son and one daughter, live in the same house with patient, Hillary Bosch pays the rent    Hobbies reading ([employer])       Family History:   Family History   Problem Relation Age of Onset    Cancer Mother 76        survived   Buren Neer Hypertension Father     Diabetes Sister     Mental Illness Sister        Review of Systems:   Positives in bold  Constitutional: fever, chills, fatigue, malaise   HENT:  rhinorrhea, sinus pain, sore throat, epistaxis    Eyes:  photophobia, visual disturbance, eye redness  Respiratory: shortness of breath, cough, hemoptysis    Cardiovascular: chest pain, palpitations, orthopnea, leg swelling   Gastrointestinal: abdominal pain, nausea, vomiting, diarrhea, constipation   Endocrine: polydipsia, polyphagia, cold intolerance, heat intolerance  Genitourinary: dysuria, flank pain, frequency, urgency   Hematologic: easy bruising, easy bleeding  Musculoskeletal: back pain, neck pain, myalgias, arthalgias  Neurological: syncope, lightheadedness, dizziness, seizures, tremors, weakness  Psychiatric/Behavioral: anxiety, depression, hallucinations  Skin: rash, itching    Physical exam:   Constitutional:  VITALS:  BP (!) 132/44   Pulse 74   Temp 97.4 °F (36.3 °C) (Oral)   Resp 16   Ht 5' 7\" (1.702 m)   Wt 213 lb (96.6 kg)   LMP  (LMP Unknown)   SpO2 100%   BMI 33.36 kg/m²     General: alert, in no apparent distress  HEENT: normocephalic, atraumatic, anicteric  Neck: supple, no mass  Lungs: non-labored respirations, clear to auscultation bilaterally  Heart: regular rate and rhythm, no murmurs or rubs  Abdomen: soft, non-tender, non-distended  MSK: no joint swelling or tenderness  Ext: no cyanosis, no peripheral edema Neuro: alert and oriented, no gross abnormalities  Psych: normal mood and affect    Data/  CBC:   Lab Results   Component Value Date    WBC 5.6 01/20/2021    RBC 2.79 01/20/2021    HGB 8.7 01/20/2021    HCT 25.1 01/20/2021    MCV 89.8 01/20/2021    MCH 31.3 01/20/2021    MCHC 34.9 01/20/2021    RDW 16.0 01/20/2021     01/20/2021    MPV 10.0 03/05/2020     BMP:    Lab Results   Component Value Date     01/20/2021    K 4.0 01/20/2021    CL 91 01/20/2021    CO2 27 01/20/2021    BUN 29 01/20/2021    LABALBU 4.0 01/19/2021    CREATININE 4.02 01/20/2021    CALCIUM 9.6 01/20/2021    GFRAA 13.6 01/20/2021    LABGLOM 11.2 01/20/2021    GLUCOSE 268 01/20/2021    GLUCOSE 102 10/16/2020     Ct Head Wo Contrast    Result Date: 1/19/2021  CT HEAD WO CONTRAST, CT CERVICAL SPINE WO CONTRAST: 1/19/2021 CLINICAL HISTORY:  fall with head injury . COMPARISON: Head CT 1/3/2021 and cervical spine CT 10/8/2017. TECHNIQUE: ROUTINE All CT scans at this facility use dose modulation, iterative reconstruction, and/or weight based dosing when appropriate to reduce radiation dose to as low as reasonably achievable. HEAD CT FINDINGS: There is no intracranial hemorrhage, mass effect, midline shift, extra-axial collection, hydrocephalus, evidence of a recent or remote ischemic infarct or skull fracture identified. Mild generalized cerebral volume loss and mild white matter changes are again noted. Chronic opacification of the paranasal sinuses appears unchanged. CERVICAL SPINE CT FINDINGS: The spine is visualized from the craniovertebral junction through the T3-4  level. There is no fracture, dislocation, or acute paraspinous soft tissue abnormalities identified. Moderate reversal of the normal cervical lordosis with moderately extensive degenerative changes are not significantly changed from the prior study. FINAL IMPRESSION: NO ACUTE INTRACRANIAL PROCESS, FRACTURE, OR EVIDENCE OF CERVICAL SPINE INJURY IDENTIFIED. MODERATELY EXTENSIVE CERVICAL SPONDYLOSIS, NOT SIGNIFICANTLY CHANGED FROM 10/8/2017. Ct Head Wo Contrast    Result Date: 1/3/2021  CT HEAD WO CONTRAST CLINICAL HISTORY:  dizziness Comparison: None TECHNIQUE: Multiple unenhanced serial axial images of the brain from the vertex of the skull to the base of the skull were performed. FINDINGS: The ventricles are dilated. This is compensatory to the surrounding  generalized parenchymal volume loss. No mass. No midline shift. The cisterns are patent. No acute intra-axial or extra-axial findings The visualized osseous structures are unremarkable. There is patchy opacification of the anterior posterior ethmoids. There is mucoperiosteal thickening with air-fluid level in the maxillary sinus. There is bilateral almost complete opacification of the maxillary sinuses. The mastoids are well aerated. Hosea Kick IMPRESSION NO ACUTE INTRA-AXIAL OR EXTRA-AXIAL FINDINGS. CHANGES IN PARANASAL SINUSES DESCRIBED ABOVE. CORRELATE CLINICALLY All CT scans at this facility use dose modulation, iterative reconstruction, and/or weight based dosing when appropriate to reduce radiation dose to as low as reasonably achievable.      Ct Chest W Contrast    Result Date: 1/19/2021 CT CHEST W CONTRAST, CT ABDOMEN PELVIS W IV CONTRAST, CT LUMBAR SPINE WO CONTRAST, CT THORACIC SPINE WO CONTRAST : 1/19/2021 CLINICAL HISTORY:  chest pain post fall . COMPARISON: CT abdomen pelvis 10/8/2017 and chest CT 12/16/2014. TECHNIQUE: Spiral images were obtained of the chest, abdomen and pelvis after the uneventful intravenous administration of approximately 100 mL of Isovue-370 contrast. Routine multiplanar reformatted reconstructions were obtained; including dedicated thoracic and lumbar spine reconstructions. All CT scans at this facility use dose modulation, iterative reconstruction, and/or weight based dosing when appropriate to reduce radiation dose to as low as reasonably achievable. CHEST CT FINDINGS: There are no displaced fractures, significant pulmonary contusion, evidence of great vessel injury, significant hematoma, pneumothorax, pleural or pericardial effusion. Mild subpleural interstitial lung disease of the mid to lower lung fields has mildly progressed from 12/16/2014. Mild mediastinal and hilar lymphadenopathy is probably reactive. ABDOMEN AND PELVIS CT FINDINGS: There is no evidence of solid organ injury, displaced fractures, or significant hematoma. Borderline wall thickening and/or fluid is noted around an otherwise unremarkable. The liver, spleen, pancreas, adrenal glands, kidneys, great vessels, unopacified bowel loops, urinary bladder, and additional images of pelvis are unremarkable. THORACIC SPINE CT FINDINGS: An old mild to moderate compression fractures of T11 and mild degenerative changes of the thoracic spine are present. There are no acute fractures, dislocation or acute paraspinous soft tissue abnormalities identified. LUMBAR SPINE CT FINDINGS: There is no fracture, dislocation, or acute paraspinous soft tissue abnormalities identified. Mild degenerative changes are noted. FINAL IMPRESSION: NO ACUTE FRACTURES OR POSTTRAUMATIC COMPLICATION IDENTIFIED. BORDERLINE WALL THICKENING AND/OR FLUID AROUND AN OTHERWISE UNREMARKABLE. CHRONIC FINDINGS, AS NOTED.      Ct Lumbar Spine Wo Contrast    Result Date: 1/19/2021 FINAL IMPRESSION: NO ACUTE FRACTURES OR POSTTRAUMATIC COMPLICATION IDENTIFIED. BORDERLINE WALL THICKENING AND/OR FLUID AROUND AN OTHERWISE UNREMARKABLE. CHRONIC FINDINGS, AS NOTED. Nm Lung Scan Perfusion Only    Result Date: 12/27/2020  Nuclear medicine perfusion study. HISTORY: Elevated d-dimer. FINDINGS: Perfusion scintigraphy performed utilizing 5.6 mCi technetium MAA. No ventilation scan performed. Study done in comparison with chest radiograph, December 25, 2020, at 2326 hours. Imaging obtained in anterior, posterior, right and left lateral, and right  and left decubitus positions. Subsegmental perfusion defect identified corresponding to small effusion and airspace disease right lung base. Low probability, pulmonary embolism.     Ct Abdomen Pelvis W Iv Contrast Additional Contrast? None    Result Date: 1/19/2021

## 2021-01-20 NOTE — H&P
Hospital Medicine  History and Physical    Patient:  Alyse Piper  MRN: [id number]    CHIEF COMPLAINT:    Chief Complaint   Patient presents with    Extremity Weakness       History Obtained From:  patient, electronic medical record  Primary Care Physician: Linda Alonso MD    HISTORY OF PRESENT ILLNESS:   The patient is a 56 y.o. female who presents with progressive weakness, fatigue, dyspnea with exertion, frequent falls from home. She is a 68-year-old female with a history of ESRD dialysis Tuesday Thursday Saturday, type 2 diabetes, coronary disease with dual antiplatelet therapy, schizophrenia, CHF, who was recently discharged with COVID-19 pneumonia on 12/27 she was discharged home but had persistent weakness and fatigue with the above symptoms and has been unable to ambulate at home due to this fatigue progressively decompensating state. Daughter brought her to the emergency department as she is not able to care for her at this time with her weakness and difficulty with ambulation. Daughter brought her to the ER for rehab evaluation for possible skilled placement versus acute rehab placement. Patient has reproducible chest pain with palpation but no associated diaphoresis or EKG changes. She states that her only complaints are her weakness fatigue and dyspnea. She is not having any further fevers or chills no GI symptoms no bruising or bleeding. Vitals are stable in the emergency department on room air, basic metabolic profile significant only for a BUN 22 creatinine 3.16. CBC is relatively benign normocytic anemia 9.6 hemoglobin. Covid testing remains positive.   First positive noted to be 12/25    Past Medical History:      Diagnosis Date    Anxiety     CAD S/P percutaneous coronary angioplasty 2015, 2018    stents per dr Cee Jones CHF (congestive heart failure) ([facility]     CKD (chronic kidney disease) stage 4, GFR 15-29 ml/min ([address] 2/24/2018    CKD stage 4 due to type 2 diabetes mellitus ([facility]     COPD (chronic obstructive pulmonary disease) (HCC)     Diabetic nephropathy with proteinuria ([address]     DJD (degenerative joint disease) of knee     Dr Radha Damon GERD (gastroesophageal reflux disease)     Hemiparesis, left ([address] 2013    entered Assisted Living ([facility])    Hemodialysis patient [facility])     History of heart failure     History of seizures     History of type C viral hepatitis     HTN (hypertension)     Hyperlipidemia     Impaired mobility and activities of daily living     Mediastinal lymphadenopathy     Opal Rodriguez    Metabolic syndrome     Moderate persistent asthma without complication     Neurogenic urinary incontinence 2013    Neuropathy in diabetes ([facility]     Obesity (BMI 30-39. 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 Wayne Palmer      x1 Dr. Roro Willams, Dr Bean Osei CATH LAB PROCEDURE  10/02/2019    HYSTERECTOMY, TOTAL ABDOMINAL      one ovary intact, Dr Sahrmila Lizama, menorrhagia    [place] TOTAL KNEE ARTHROPLASTY Left 2018    LEFT KNEE TOTAL KNEE ARTHROPLASTY, SHAYNA, NERVE BLOCK performed by Willie Najera MD at [address] [place] [address] Right     TOTAL KNEE ARTHROPLASTY  16    Dr Radha Damon TUNNELED VENOUS CATHETER PLACEMENT Right 07/01/2020    tunneled HD catheter per Dr Sun Man       Medications Prior to Admission:    Prior to Admission medications    Medication Sig Start Date End Date Taking?  Authorizing Provider   pregabalin (LYRICA) 75 MG capsule TAKE ONE (1) CAPSULE BY MOUTH TWICE DAILY 1/18/21 4/18/21 Yes Mariela Staples PA-C   vitamin B-12 (CYANOCOBALAMIN) 100 MCG tablet Take 1 tablet by mouth daily  Patient taking differently: Take 100 mcg by mouth daily At night 1/18/21 1/18/22 Yes Mariela Staples PA-C   insulin aspart (NOVOLOG FLEXPEN) 100 UNIT/ML injection pen INJECT 6 units with each meals 1/14/21  Yes Aldalla Canal, DO   LANTUS SOLOSTAR 100 UNIT/ML injection pen Inject 45 Units into the skin nightly 1/14/21  Yes Alyssa Canal, DO   metoprolol tartrate (LOPRESSOR) 25 MG tablet Take 0.5 tablets by mouth 2 times daily 1/14/21  Yes Alyssa Major DO   ranolazine (RANEXA) 500 MG extended release tablet Take 1 tablet by mouth 2 times daily 11/24/20  Yes Ilana Hermosillo MD   ARIPiprazole (ABILIFY) 5 MG tablet TAKE 1 TABLET BY MOUTH DAILY 11/3/20  Yes Russ Alonso MD   BRILINTA 90 MG TABS tablet TAKE 1 TABLET BY MOUTH 2 TIMES DAILY 11/2/20  Yes Rona Stewart MD   pantoprazole (PROTONIX) 20 MG tablet TAKE 1 TABLET BY MOUTH DAILY 11/2/20  Yes Rona Stewart MD   B Complex-C-Folic Acid (NEPHRO VITAMINS) 0.8 MG TABS Take by mouth daily    Yes Historical Provider, MD   sertraline (ZOLOFT) 50 MG tablet TAKE 1 TABLET BY MOUTH DAILY 9/23/20  Yes Russ Alonso MD   isosorbide mononitrate (IMDUR) 60 MG extended release tablet Take 1 tablet by mouth daily 7/27/20  Yes Ilana Hermosillo MD   amLODIPine (NORVASC) 10 MG tablet TAKE 1 TABLET BY MOUTH DAILY 6/1/20  Yes Rona Stewart MD   aspirin 81 MG tablet Take 1 tablet by mouth daily 5/4/20  Yes Russ Alonso MD   atorvastatin (LIPITOR) 40 MG tablet Take 1 tablet by mouth daily 5/4/20  Yes Rachele Persaud MD Cj   melatonin 3 MG TABS tablet TAKE 1 TABLET BY MOUTH EVERY NIGHT AS NEEDED FOR INSOMNIA 3/17/20  Yes Terrance Bernabe MD   nitroGLYCERIN (NITROSTAT) 0.4 MG SL tablet Place 1 tablet under the tongue every 5 minutes as needed for Chest pain 9/22/15  Yes Historical Provider, MD   magnesium oxide (MAG-OX) 400 MG tablet Take 400 mg by mouth daily   Yes Historical Provider, MD   blood glucose test strips (FREESTYLE LITE) strip 1 each by Does not apply route 4 times daily (before meals and nightly) As needed. 1/18/21   MARCELO Pa   Misc. Devices (BARIATRIC ROLLATOR) MISC Rolllator with a basket 1/12/21   Terrance Bernabe MD   Blood Glucose Monitoring Suppl (FREESTYLE LITE) CORETTA 1 Device by Does not apply route daily as needed (Diabetes) Use freestyle meter to test blood sugar as needed 12/29/20   MARCELO Pa   triamcinolone (KENALOG) 0.1 % cream APPLY TO AFFECTED AREA TWICE DAILY AS DIRECTED 11/6/20   Terrance Bernabe MD   nystatin ([address]) 642416 UNIT/GM powder APPLY TO ABDOMINAL FOLDS EVERY 12 HOURS AS NEEDED 10/26/20   Terrance Bernabe MD   albuterol sulfate HFA (VENTOLIN HFA) 108 (90 Base) MCG/ACT inhaler Inhale 2 puffs into the lungs every 6 hours as needed for Wheezing    Historical Provider, MD   fluticasone (FLOVENT HFA) 110 MCG/ACT inhaler Inhale 2 puffs into the lungs 2 times daily  Patient taking differently: Inhale 2 puffs into the lungs 2 times daily as needed  9/23/20 9/23/21  Terrance Bernabe MD   [facility] [place] [place]. Devices [facility] To use with transport outside of the house.  9/8/20   Terrance Bernabe MD   ipratropium-albuterol (DUONEB) 0.5-2.5 (3) MG/3ML SOLN nebulizer solution Inhale 3 mLs into the lungs every 4 hours as needed for Shortness of Breath 7/2/20   Hernan Best MD   SURE COMFORT PEN NEEDLES 30G X 8 MM MISC USE AS DIRECTED FIVE TIMES A DAY 4/1/20   Terrance Bernabe MD       Allergies:  Codeine and Oxycontin [oxycodone hcl]    Social History:   TOBACCO:   reports that she is a non-smoker but has been exposed to tobacco smoke. She has never used smokeless tobacco.  ETOH:   reports no history of alcohol use. OCCUPATION: None    Family History:       Problem Relation Age of Onset    Cancer Mother 76        survived   Demetra Chen Hypertension Father     Diabetes Sister     Mental Illness Sister        REVIEW OF SYSTEMS:  Ten systems reviewed and negative except for as above. Physical Exam:    Vitals: BP (!) 122/53   Pulse 70   Temp 97.7 °F (36.5 °C) (Oral)   Resp 18   Ht 5' 7\" (1.702 m)   Wt 213 lb (96.6 kg)   LMP  (LMP Unknown)   SpO2 99%   BMI 33.36 kg/m²   Constitutional: alert, appears stated age and cooperative  Skin: Skin color, texture, turgor normal. No rashes or lesions  Eyes:Eye: Normal external eye, conjunctiva, JUAN. ENT: Head: Normocephalic, no lesions, without obvious abnormality. Neck: no adenopathy, no carotid bruit, no JVD, supple, symmetrical, trachea midline and thyroid not enlarged, symmetric, no tenderness/mass/nodules  Respiratory: Rhonchi bilaterally good air movement no wheezes no rales  Cardiovascular: regular rate and rhythm, S1, S2 normal, no murmur, click, rub or gallop  Gastrointestinal: soft, non-tender; bowel sounds normal; no masses,  no organomegaly  Genitourinary: Deferred  Musculoskeletal:extremities normal, atraumatic, no cyanosis or edema  Neurologic: Mental status AAOx3 No facial asymmetry or droop. Normal muscle strength b/l. Psychiatric: Appropriate mood and affect.  Good insight and judgement  Hematologic: No obvious bruising or bleeding    Recent Labs     01/19/21  1630   WBC 8.4   HGB 9.6*        Recent Labs     01/19/21  1630   *   K 4.0   CL 90*   CO2 29   BUN 22   CREATININE 3.16*   GLUCOSE 159*   AST 34   ALT 16   BILITOT 0.6   ALKPHOS 169*       ABGs:   Lab Results   Component Value Date    PHART 7.389 05/24/2020    PO2ART 98 05/24/2020 HDG1WUK 32 05/24/2020     INR:   Recent Labs     01/19/21  1630   INR 1.1     URINALYSIS:No results for input(s): NITRITE, COLORU, PHUR, LABCAST, WBCUA, RBCUA, MUCUS, TRICHOMONAS, YEAST, BACTERIA, CLARITYU, SPECGRAV, LEUKOCYTESUR, UROBILINOGEN, BILIRUBINUR, BLOODU, GLUCOSEU, AMORPHOUS in the last 72 hours. Invalid input(s): Heriberto Olivares  -----------------------------------------------------------------   Ct Head Wo Contrast    Result Date: 1/19/2021  CT HEAD WO CONTRAST, CT CERVICAL SPINE WO CONTRAST: 1/19/2021 CLINICAL HISTORY:  fall with head injury . COMPARISON: Head CT 1/3/2021 and cervical spine CT 10/8/2017. TECHNIQUE: ROUTINE All CT scans at this facility use dose modulation, iterative reconstruction, and/or weight based dosing when appropriate to reduce radiation dose to as low as reasonably achievable. HEAD CT FINDINGS: There is no intracranial hemorrhage, mass effect, midline shift, extra-axial collection, hydrocephalus, evidence of a recent or remote ischemic infarct or skull fracture identified. Mild generalized cerebral volume loss and mild white matter changes are again noted. Chronic opacification of the paranasal sinuses appears unchanged. CERVICAL SPINE CT FINDINGS: The spine is visualized from the craniovertebral junction through the T3-4  level. There is no fracture, dislocation, or acute paraspinous soft tissue abnormalities identified. Moderate reversal of the normal cervical lordosis with moderately extensive degenerative changes are not significantly changed from the prior study. FINAL IMPRESSION: NO ACUTE INTRACRANIAL PROCESS, FRACTURE, OR EVIDENCE OF CERVICAL SPINE INJURY IDENTIFIED. MODERATELY EXTENSIVE CERVICAL SPONDYLOSIS, NOT SIGNIFICANTLY CHANGED FROM 10/8/2017. Ct Chest W Contrast    Result Date: 1/19/2021  CT CHEST W CONTRAST, CT ABDOMEN PELVIS W IV CONTRAST, CT LUMBAR SPINE WO CONTRAST, CT THORACIC SPINE WO CONTRAST : 1/19/2021 CLINICAL HISTORY:  chest pain post fall . COMPARISON: CT abdomen pelvis 10/8/2017 and chest CT 12/16/2014. TECHNIQUE: Spiral images were obtained of the chest, abdomen and pelvis after the uneventful intravenous administration of approximately 100 mL of Isovue-370 contrast. Routine multiplanar reformatted reconstructions were obtained; including dedicated thoracic and lumbar spine reconstructions. All CT scans at this facility use dose modulation, iterative reconstruction, and/or weight based dosing when appropriate to reduce radiation dose to as low as reasonably achievable. CHEST CT FINDINGS: There are no displaced fractures, significant pulmonary contusion, evidence of great vessel injury, significant hematoma, pneumothorax, pleural or pericardial effusion. Mild subpleural interstitial lung disease of the mid to lower lung fields has mildly progressed from 12/16/2014. Mild mediastinal and hilar lymphadenopathy is probably reactive. ABDOMEN AND PELVIS CT FINDINGS: There is no evidence of solid organ injury, displaced fractures, or significant hematoma. Borderline wall thickening and/or fluid is noted around an otherwise unremarkable. The liver, spleen, pancreas, adrenal glands, kidneys, great vessels, unopacified bowel loops, urinary bladder, and additional images of pelvis are unremarkable. THORACIC SPINE CT FINDINGS: An old mild to moderate compression fractures of T11 and mild degenerative changes of the thoracic spine are present. There are no acute fractures, dislocation or acute paraspinous soft tissue abnormalities identified. LUMBAR SPINE CT FINDINGS: There is no fracture, dislocation, or acute paraspinous soft tissue abnormalities identified. Mild degenerative changes are noted. FINAL IMPRESSION: NO ACUTE FRACTURES OR POSTTRAUMATIC COMPLICATION IDENTIFIED. BORDERLINE WALL THICKENING AND/OR FLUID AROUND AN OTHERWISE UNREMARKABLE. CHRONIC FINDINGS, AS NOTED.      Ct Cervical Spine Wo Contrast    Result Date: 1/19/2021  CT HEAD WO CONTRAST, CT CERVICAL SPINE WO CONTRAST: 1/19/2021 CLINICAL HISTORY:  fall with head injury . COMPARISON: Head CT 1/3/2021 and cervical spine CT 10/8/2017. TECHNIQUE: ROUTINE All CT scans at this facility use dose modulation, iterative reconstruction, and/or weight based dosing when appropriate to reduce radiation dose to as low as reasonably achievable. HEAD CT FINDINGS: There is no intracranial hemorrhage, mass effect, midline shift, extra-axial collection, hydrocephalus, evidence of a recent or remote ischemic infarct or skull fracture identified. Mild generalized cerebral volume loss and mild white matter changes are again noted. Chronic opacification of the paranasal sinuses appears unchanged. CERVICAL SPINE CT FINDINGS: The spine is visualized from the craniovertebral junction through the T3-4  level. There is no fracture, dislocation, or acute paraspinous soft tissue abnormalities identified. Moderate reversal of the normal cervical lordosis with moderately extensive degenerative changes are not significantly changed from the prior study. FINAL IMPRESSION: NO ACUTE INTRACRANIAL PROCESS, FRACTURE, OR EVIDENCE OF CERVICAL SPINE INJURY IDENTIFIED. MODERATELY EXTENSIVE CERVICAL SPONDYLOSIS, NOT SIGNIFICANTLY CHANGED FROM 10/8/2017. Ct Thoracic Spine Wo Contrast    Result Date: 1/19/2021  CT CHEST W CONTRAST, CT ABDOMEN PELVIS W IV CONTRAST, CT LUMBAR SPINE WO CONTRAST, CT THORACIC SPINE WO CONTRAST : 1/19/2021 CLINICAL HISTORY:  chest pain post fall . COMPARISON: CT abdomen pelvis 10/8/2017 and chest CT 12/16/2014. TECHNIQUE: Spiral images were obtained of the chest, abdomen and pelvis after the uneventful intravenous administration of approximately 100 mL of Isovue-370 contrast. Routine multiplanar reformatted reconstructions were obtained; including dedicated thoracic and lumbar spine reconstructions.  All CT scans at this facility use dose modulation, iterative reconstruction, and/or weight based dosing when appropriate to reduce radiation dose to as low as reasonably achievable. CHEST CT FINDINGS: There are no displaced fractures, significant pulmonary contusion, evidence of great vessel injury, significant hematoma, pneumothorax, pleural or pericardial effusion. Mild subpleural interstitial lung disease of the mid to lower lung fields has mildly progressed from 12/16/2014. Mild mediastinal and hilar lymphadenopathy is probably reactive. ABDOMEN AND PELVIS CT FINDINGS: There is no evidence of solid organ injury, displaced fractures, or significant hematoma. Borderline wall thickening and/or fluid is noted around an otherwise unremarkable. The liver, spleen, pancreas, adrenal glands, kidneys, great vessels, unopacified bowel loops, urinary bladder, and additional images of pelvis are unremarkable. THORACIC SPINE CT FINDINGS: An old mild to moderate compression fractures of T11 and mild degenerative changes of the thoracic spine are present. There are no acute fractures, dislocation or acute paraspinous soft tissue abnormalities identified. LUMBAR SPINE CT FINDINGS: There is no fracture, dislocation, or acute paraspinous soft tissue abnormalities identified. Mild degenerative changes are noted. FINAL IMPRESSION: NO ACUTE FRACTURES OR POSTTRAUMATIC COMPLICATION IDENTIFIED. BORDERLINE WALL THICKENING AND/OR FLUID AROUND AN OTHERWISE UNREMARKABLE. CHRONIC FINDINGS, AS NOTED. Ct Lumbar Spine Wo Contrast    Result Date: 1/19/2021  CT CHEST W CONTRAST, CT ABDOMEN PELVIS W IV CONTRAST, CT LUMBAR SPINE WO CONTRAST, CT THORACIC SPINE WO CONTRAST : 1/19/2021 CLINICAL HISTORY:  chest pain post fall . COMPARISON: CT abdomen pelvis 10/8/2017 and chest CT 12/16/2014.  TECHNIQUE: Spiral images were obtained of the chest, abdomen and pelvis after the uneventful intravenous administration of approximately 100 mL of Isovue-370 contrast. Routine multiplanar reformatted reconstructions were obtained; including dedicated thoracic and lumbar spine reconstructions. All CT scans at this facility use dose modulation, iterative reconstruction, and/or weight based dosing when appropriate to reduce radiation dose to as low as reasonably achievable. CHEST CT FINDINGS: There are no displaced fractures, significant pulmonary contusion, evidence of great vessel injury, significant hematoma, pneumothorax, pleural or pericardial effusion. Mild subpleural interstitial lung disease of the mid to lower lung fields has mildly progressed from 12/16/2014. Mild mediastinal and hilar lymphadenopathy is probably reactive. ABDOMEN AND PELVIS CT FINDINGS: There is no evidence of solid organ injury, displaced fractures, or significant hematoma. Borderline wall thickening and/or fluid is noted around an otherwise unremarkable. The liver, spleen, pancreas, adrenal glands, kidneys, great vessels, unopacified bowel loops, urinary bladder, and additional images of pelvis are unremarkable. THORACIC SPINE CT FINDINGS: An old mild to moderate compression fractures of T11 and mild degenerative changes of the thoracic spine are present. There are no acute fractures, dislocation or acute paraspinous soft tissue abnormalities identified. LUMBAR SPINE CT FINDINGS: There is no fracture, dislocation, or acute paraspinous soft tissue abnormalities identified. Mild degenerative changes are noted. FINAL IMPRESSION: NO ACUTE FRACTURES OR POSTTRAUMATIC COMPLICATION IDENTIFIED. BORDERLINE WALL THICKENING AND/OR FLUID AROUND AN OTHERWISE UNREMARKABLE. CHRONIC FINDINGS, AS NOTED. Ct Abdomen Pelvis W Iv Contrast Additional Contrast? None    Result Date: 1/19/2021  CT CHEST W CONTRAST, CT ABDOMEN PELVIS W IV CONTRAST, CT LUMBAR SPINE WO CONTRAST, CT THORACIC SPINE WO CONTRAST : 1/19/2021 CLINICAL HISTORY:  chest pain post fall . COMPARISON: CT abdomen pelvis 10/8/2017 and chest CT 12/16/2014.  TECHNIQUE: Spiral images were obtained of the chest, abdomen and pelvis after the uneventful placement. 2. Recent COVID-19 infection: Currently asymptomatic saturating well on room air. Monitor for any decompensation  3. ESRD on hemodialysis: Consult to nephrology, diabetic renal diet  4. Hypertension: Norvasc, Imdur  5. Depression: Abilify, Lyrica, Zoloft  6. Coronary disease: Statin, aspirin, Brilinta metoprolol 25 twice daily, Ranexa  7. Type 2 diabetes: Lantus 45 units nightly, 8 units 3 times daily before meals and low-dose sliding scale coverage  8. Reactive airway disease: Combivent and Flovent inhaler  9.  DVT prophylaxis with heparin subcu    Patient Active Problem List   Diagnosis Code    Coronary artery disease involving native heart with angina pectoris (HCC) I25.119    Schizophrenia, paranoid, chronic O59.5    Metabolic syndrome P31.19    Vitamin B 12 deficiency E53.8    Cerebral microvascular disease I67.89    Mixed hyperlipidemia E78.2    Other hammer toe (acquired) M20.40    Vitamin D insufficiency E55.9    Incontinence of urine R32    Diabetic nephropathy with proteinuria ([address] E11.21    Essential hypertension I10    History of type C viral hepatitis Z86.19    Urinary incontinence due to cognitive impairment R39.81    History of seizures Z87.898    Stented coronary artery-plan is to stay on Plavix indefinately per Dr Eryn Valle Z95.5    Hemiparesis, left ([address] G81.94    Angina, class II ([address] I20.9    Chronic painful diabetic neuropathy ([address] E11.40    Tardive dyskinesia G24.01    Shortness of breath R06.02    Uncontrolled type 2 diabetes mellitus with hyperglycemia (HCC) B06.62    Diastolic congestive heart failure (HCC) I50.30    Sleep apnea G47.30    Pulmonary hypertension (HCC) I27.20    Class 2 severe obesity with serious comorbidity and body mass index (BMI) of 36.0 to 36.9 in adult (HCC) E66.01, Z68.36    Edema R60.9    Closed supracondylar fracture of right humerus S42.411A    Other chronic pain G89.29    Palliative care patient Z51.5    Chest pain R07.9  Recurrent falls R29.6    Renal failure N19    Difficulty in walking R26.2    ESRD (end stage renal disease) on dialysis (HCC) N18.6, Z99.2    Weakness R53.1    Moderate persistent asthma without complication G96.22    Thrush B37.0    COVID-19 U07.1    Post PTCA Z98.61    Falls frequently R29.6       Burke Antonio DO  Admitting Hospitalist    Emergency Contact: WDL

## 2021-01-20 NOTE — PROGRESS NOTES
Hospitalist Progress Note      PCP: Luisa Dumont MD    Date of Admission: 1/19/2021    Chief Complaint:  No acute events, afebrile, stable HD    Medications:  Reviewed    Infusion Medications    dextrose      sodium chloride       Scheduled Medications    magnesium oxide  400 mg Oral Daily    aspirin  81 mg Oral Daily    atorvastatin  40 mg Oral Daily    amLODIPine  10 mg Oral Daily    isosorbide mononitrate  60 mg Oral Daily    sertraline  50 mg Oral Daily    ARIPiprazole  5 mg Oral Daily    pantoprazole  20 mg Oral Daily    ranolazine  500 mg Oral BID    insulin glargine  45 Units Subcutaneous Nightly    metoprolol tartrate  12.5 mg Oral BID    pregabalin  75 mg Oral Daily    ticagrelor  90 mg Oral BID    fluticasone  1 puff Inhalation BID    miconazole   Topical BID    insulin lispro  0.08 Units/kg Subcutaneous TID WC    insulin lispro  0-6 Units Subcutaneous TID WC    insulin lispro  0-3 Units Subcutaneous Nightly    pill splitter   Does not apply Once    cholestyramine light  4 g Oral BID    epoetin chadwick-epbx  8,000 Units Subcutaneous Once in dialysis    heparin (porcine)  5,000 Units Subcutaneous 3 times per day     PRN Meds: ipratropium-albuterol, glucose, dextrose, glucagon (rDNA), dextrose, albuterol-ipratropium, promethazine **OR** ondansetron, polyethylene glycol, acetaminophen **OR** acetaminophen      Intake/Output Summary (Last 24 hours) at 1/20/2021 1316  Last data filed at 1/20/2021 1000  Gross per 24 hour   Intake 480 ml   Output --   Net 480 ml       Exam:    BP (!) 127/46   Pulse 69   Temp 97.5 °F (36.4 °C) (Oral)   Resp 18   Ht 5' 7\" (1.702 m)   Wt 213 lb (96.6 kg)   LMP  (LMP Unknown)   SpO2 99%   BMI 33.36 kg/m²     General appearance: appears stated age and cooperative. Respiratory: Clear to auscultation bilaterally   Cardiovascular: regular rate and rhythm, S1/S2 . Abdomen: Soft, active bowel sounds. Musculoskeletal: No edema bilaterally. Labs:   Recent Labs     01/19/21  1630 01/20/21  0704   WBC 8.4 5.6   HGB 9.6* 8.7*   HCT 27.0* 25.1*    175     Recent Labs     01/19/21  1630 01/20/21  0704   * 132*   K 4.0 4.0   CL 90* 91*   CO2 29 27   BUN 22 29*   CREATININE 3.16* 4.02*   CALCIUM 9.3 9.6   PHOS  --  4.6     Recent Labs     01/19/21  1630   AST 34   ALT 16   BILITOT 0.6   ALKPHOS 169*     Recent Labs     01/19/21  1630   INR 1.1     No results for input(s): CKTOTAL, TROPONINI in the last 72 hours. Urinalysis:      Lab Results   Component Value Date    NITRU Negative 12/25/2020    WBCUA 0-2 12/25/2020    BACTERIA Negative 12/25/2020    RBCUA 0-2 12/25/2020    BLOODU Negative 12/25/2020    SPECGRAV 1.020 12/25/2020    GLUCOSEU 500 12/25/2020       Radiology:  CT Head WO Contrast   Final Result   FINAL IMPRESSION:      NO ACUTE INTRACRANIAL PROCESS, FRACTURE, OR EVIDENCE OF CERVICAL SPINE INJURY IDENTIFIED. MODERATELY EXTENSIVE CERVICAL SPONDYLOSIS, NOT SIGNIFICANTLY CHANGED FROM 10/8/2017. CT CERVICAL SPINE WO CONTRAST   Final Result   FINAL IMPRESSION:      NO ACUTE INTRACRANIAL PROCESS, FRACTURE, OR EVIDENCE OF CERVICAL SPINE INJURY IDENTIFIED. MODERATELY EXTENSIVE CERVICAL SPONDYLOSIS, NOT SIGNIFICANTLY CHANGED FROM 10/8/2017. CT THORACIC SPINE WO CONTRAST   Final Result   FINAL IMPRESSION:       NO ACUTE FRACTURES OR POSTTRAUMATIC COMPLICATION IDENTIFIED. BORDERLINE WALL THICKENING AND/OR FLUID AROUND AN OTHERWISE UNREMARKABLE. CHRONIC FINDINGS, AS NOTED. CT CHEST W CONTRAST   Final Result   FINAL IMPRESSION:       NO ACUTE FRACTURES OR POSTTRAUMATIC COMPLICATION IDENTIFIED. BORDERLINE WALL THICKENING AND/OR FLUID AROUND AN OTHERWISE UNREMARKABLE. CHRONIC FINDINGS, AS NOTED. CT ABDOMEN PELVIS W IV CONTRAST Additional Contrast? None   Final Result   FINAL IMPRESSION:       NO ACUTE FRACTURES OR POSTTRAUMATIC COMPLICATION IDENTIFIED.       BORDERLINE WALL THICKENING AND/OR FLUID AROUND AN OTHERWISE UNREMARKABLE. CHRONIC FINDINGS, AS NOTED. CT LUMBAR SPINE WO CONTRAST   Final Result   FINAL IMPRESSION:       NO ACUTE FRACTURES OR POSTTRAUMATIC COMPLICATION IDENTIFIED. BORDERLINE WALL THICKENING AND/OR FLUID AROUND AN OTHERWISE UNREMARKABLE. CHRONIC FINDINGS, AS NOTED.                     Assessment/Plan:    68-year-old female with a history of ESRD dialysis Tuesday Thursday Saturday, type 2 diabetes, coronary disease with dual antiplatelet therapy, schizophrenia, CHF, who was recently managed for COVID-19 pneumonia who presented with:     Weakness and falls  - likely due to recent COVID infection  - PT/OT eval    Recent COVID-19 infection  - on RA    HTN, CAD  - continue home regimen    ESRD on IHD  - management per nephrology    IDDM2 with hyperglycemia  - ISS, lantus    Anemia   - follow H/H    Diet: DIET RENAL; Carb Control: 3 carb choices (45 gms)/meal; No Added Salt (3-4 GM)    Code Status: Full Code      Disposition - might need SNF,  following          Electronically signed by Cami Krause MD on 1/20/2021 at 1:16 PM

## 2021-01-20 NOTE — CARE COORDINATION
[facility] Case Management Initial Discharge Assessment    Met with Patient to discuss discharge plan. PCP: Lucio Muse MD                                Date of Last Visit: 2 weeks    If no PCP, list provided? N/A    Discharge Planning    Living Arrangements: independently at home    Who do you live with? family    Who helps you with your care:  self    If lives at home:     Do you have any barriers navigating in your home? no    Patient can perform ADL? Yes    Current Services (outpatient and in home) :  [employer] Whe[employer] ([address] [place] [address])    Dialysis: No    Is transportation available to get to your appointments? Yes    DME Equipment:  yes - walker    Respiratory equipment: None    Respiratory provider:  no     Pharmacy:  [facility] - [facility]    Consult with Medication Assistance Program?  No        Does Patient Have a High-Risk for Readmission Diagnosis (CHF, PN, MI, COPD)? No        Initial Discharge Plan? (Note: please see concurrent daily documentation for any updates after initial note). CM to assess for d/c needs.       Electronically signed by Celso King on 1/19/2021 at 9:25 PM

## 2021-01-20 NOTE — CONSULTS
[address]                      [address], [address] [facility]                                  CONSULTATION    PATIENT NAME: Chris Kenny                  :        1960  MED REC NO:   [id number]                            ROOM:       W572  ACCOUNT NO:   [de-identified]                           ADMIT DATE: 2021  PROVIDER:     Estelle Alfonso DO    CONSULT DATE:  2021    HISTORY OF PRESENT ILLNESS:  A 49-year-old mildly obese female admitted  to the hospital with progressive weakness, fatigue, and shortness of  breath with exertion. The patient had been having falls at home, but  denies striking her head. The patient has had COVID-19 at the end of  2020. She was placed in a dialysis [address] [place] [address] Road and now has returned  to the routine dialysis center after being deemed negative. She does  receive hemodialysis Tuesday, Thursday, Saturday through a left arm  fistula. She has known organic heart disease with coronary vessel  disease, known history of CHF. The patient has a history of  schizophrenia, but has been stable. She is a known diabetic with  insulin dependency. She has hyperlipidemia, history of seizures, and  history of hepatitis C in the past.  Treatment uncertain, COPD but  stable. The patient has been compliant with dialysis. She has an  active fistula. The patient has been having diarrhea for the past 4 days with only fair  intake of fluids. She denies any blood in the stool. PAST MEDICAL HISTORY:  End-stage renal disease, hypertension,  hyperlipidemia, schizophrenia, COPD, coronary artery disease, diastolic  heart failure, prior COVID-19 positivity. PAST SURGICAL HISTORY:  Left arm fistula with bruit and thrill,  colonoscopy, , hysterectomy, right toe amputation, total left  knee replacement, cardiac cath with stents. FAMILY HISTORY:  Noncontributory. MEDICATIONS:  At the time of her admission in to the hospital; Flovent,  Ventolin, Kenalog cream, nystatin powder, melatonin, mag oxide, Lipitor,  aspirin, Norvasc, Nephrocaps, Protonix, Brilinta, Abilify, Ranexa,  Lantus, Lyrica. ALLERGIES TO MEDICATIONS:  CODEINE and OXYCONTIN. REVIEW OF SYSTEMS:  Unremarkable. PHYSICAL EXAMINATION:  VITAL SIGNS:  5 feet and 7 inches, 213 pounds. Blood pressure is  130/40, heart rate 70, respirations 16, afebrile. HEENT:  Normocephalic. Pupils equal and reactive to light. Sclera is  clear. NECK:  Supple. No JVD or adenopathy. CHEST:  Lungs are clear with decreased sounds. No wheezing, rales or  rhonchi. CARDIOVASCULAR:  Heart is regular, 1/6 systolic murmur. ABDOMEN:  Obese, soft. No guarding or rigidity. Bowel sounds are  present. EXTREMITIES:  Show left arm fistula with bruit and thrill. Lower limb  showed no edema. IMPRESSION:  End-stage renal disease, dialysis Tuesday, Thursday,  Saturday through fistula; diarrhea of viral possibility; left arm  fistula, bruit and thrill; history of anemia; history of seizures;  history of hepatitis C; prior COVID-19 positive, now presently dialyzing  at routine [facility], indications being negative prior to returning  to that unit; history of schizophrenia; diabetes mellitus type 2;  organic heart disease with coronary artery disease and diastolic heart  failure. PLAN:  IV fluids. Watch for diarrhea. Check stools. IV fluids to  begin due to mild dehydration. Dialysis tomorrow. COVID testing  uncertain at this time as she has been positive in the past and has been  out for 20 days.         Sarah Mazariegos DO    D: 01/20/2021 14:57:19       T: 01/20/2021 15:04:41     [id number]  Job#: [id number]     Doc#: [id number]    CC:

## 2021-01-21 LAB
ANION GAP SERPL CALCULATED.3IONS-SCNC: 17 MEQ/L (ref 9–15)
BUN BLDV-MCNC: 35 MG/DL (ref 8–23)
CALCIUM SERPL-MCNC: 9.5 MG/DL (ref 8.5–9.9)
CHLORIDE BLD-SCNC: 88 MEQ/L (ref 95–107)
CO2: 25 MEQ/L (ref 20–31)
CREAT SERPL-MCNC: 4.88 MG/DL (ref 0.5–0.9)
GFR AFRICAN AMERICAN: 10.9
GFR NON-AFRICAN AMERICAN: 9
GLUCOSE BLD-MCNC: 159 MG/DL (ref 70–99)
GLUCOSE BLD-MCNC: 177 MG/DL (ref 60–115)
GLUCOSE BLD-MCNC: 202 MG/DL (ref 60–115)
GLUCOSE BLD-MCNC: 225 MG/DL (ref 60–115)
GLUCOSE BLD-MCNC: 254 MG/DL (ref 60–115)
GLUCOSE BLD-MCNC: 84 MG/DL (ref 60–115)
HCT VFR BLD CALC: 24.4 % (ref 37–47)
HEMOGLOBIN: 8.4 G/DL (ref 12–16)
MAGNESIUM: 2.3 MG/DL (ref 1.7–2.4)
MCH RBC QN AUTO: 30.6 PG (ref 27–31.3)
MCHC RBC AUTO-ENTMCNC: 34.5 % (ref 33–37)
MCV RBC AUTO: 88.8 FL (ref 82–100)
PDW BLD-RTO: 15.6 % (ref 11.5–14.5)
PERFORMED ON: ABNORMAL
PERFORMED ON: NORMAL
PHOSPHORUS: 4.8 MG/DL (ref 2.3–4.8)
PLATELET # BLD: 196 K/UL (ref 130–400)
POTASSIUM REFLEX MAGNESIUM: 3.9 MEQ/L (ref 3.4–4.9)
RBC # BLD: 2.75 M/UL (ref 4.2–5.4)
SODIUM BLD-SCNC: 130 MEQ/L (ref 135–144)
WBC # BLD: 7.9 K/UL (ref 4.8–10.8)

## 2021-01-21 PROCEDURE — 96372 THER/PROPH/DIAG INJ SC/IM: CPT

## 2021-01-21 PROCEDURE — 2580000003 HC RX 258

## 2021-01-21 PROCEDURE — 6370000000 HC RX 637 (ALT 250 FOR IP): Performed by: INTERNAL MEDICINE

## 2021-01-21 PROCEDURE — 94761 N-INVAS EAR/PLS OXIMETRY MLT: CPT

## 2021-01-21 PROCEDURE — 94640 AIRWAY INHALATION TREATMENT: CPT

## 2021-01-21 PROCEDURE — 83735 ASSAY OF MAGNESIUM: CPT

## 2021-01-21 PROCEDURE — 6360000002 HC RX W HCPCS: Performed by: INTERNAL MEDICINE

## 2021-01-21 PROCEDURE — 36415 COLL VENOUS BLD VENIPUNCTURE: CPT

## 2021-01-21 PROCEDURE — 2060000000 HC ICU INTERMEDIATE R&B

## 2021-01-21 PROCEDURE — 6360000002 HC RX W HCPCS: Performed by: STUDENT IN AN ORGANIZED HEALTH CARE EDUCATION/TRAINING PROGRAM

## 2021-01-21 PROCEDURE — 80048 BASIC METABOLIC PNL TOTAL CA: CPT

## 2021-01-21 PROCEDURE — 85027 COMPLETE CBC AUTOMATED: CPT

## 2021-01-21 PROCEDURE — 97162 PT EVAL MOD COMPLEX 30 MIN: CPT

## 2021-01-21 PROCEDURE — 84100 ASSAY OF PHOSPHORUS: CPT

## 2021-01-21 RX ORDER — SODIUM CHLORIDE 9 MG/ML
INJECTION, SOLUTION INTRAVENOUS
Status: COMPLETED
Start: 2021-01-21 | End: 2021-01-21

## 2021-01-21 RX ADMIN — ZINC SULFATE 220 MG (50 MG) CAPSULE 50 MG: CAPSULE at 08:46

## 2021-01-21 RX ADMIN — SERTRALINE 50 MG: 50 TABLET, FILM COATED ORAL at 08:46

## 2021-01-21 RX ADMIN — Medication 400 MG: at 08:46

## 2021-01-21 RX ADMIN — CHOLESTYRAMINE 4 G: 4 POWDER, FOR SUSPENSION ORAL at 08:47

## 2021-01-21 RX ADMIN — RANOLAZINE 500 MG: 500 TABLET, FILM COATED, EXTENDED RELEASE ORAL at 20:35

## 2021-01-21 RX ADMIN — RANOLAZINE 500 MG: 500 TABLET, FILM COATED, EXTENDED RELEASE ORAL at 08:46

## 2021-01-21 RX ADMIN — METOPROLOL TARTRATE 12.5 MG: 25 TABLET, FILM COATED ORAL at 20:36

## 2021-01-21 RX ADMIN — OXYCODONE HYDROCHLORIDE AND ACETAMINOPHEN 500 MG: 500 TABLET ORAL at 08:47

## 2021-01-21 RX ADMIN — INSULIN LISPRO 8 UNITS: 100 INJECTION, SOLUTION INTRAVENOUS; SUBCUTANEOUS at 12:00

## 2021-01-21 RX ADMIN — PANTOPRAZOLE SODIUM 20 MG: 20 TABLET, DELAYED RELEASE ORAL at 08:46

## 2021-01-21 RX ADMIN — TICAGRELOR 90 MG: 90 TABLET ORAL at 08:46

## 2021-01-21 RX ADMIN — METOPROLOL TARTRATE 12.5 MG: 25 TABLET, FILM COATED ORAL at 08:46

## 2021-01-21 RX ADMIN — HEPARIN SODIUM 5000 UNITS: 5000 INJECTION INTRAVENOUS; SUBCUTANEOUS at 05:58

## 2021-01-21 RX ADMIN — INSULIN LISPRO 8 UNITS: 100 INJECTION, SOLUTION INTRAVENOUS; SUBCUTANEOUS at 08:00

## 2021-01-21 RX ADMIN — INSULIN GLARGINE 45 UNITS: 100 INJECTION, SOLUTION SUBCUTANEOUS at 23:12

## 2021-01-21 RX ADMIN — ARIPIPRAZOLE 5 MG: 5 TABLET ORAL at 08:46

## 2021-01-21 RX ADMIN — OXYCODONE HYDROCHLORIDE AND ACETAMINOPHEN 500 MG: 500 TABLET ORAL at 20:35

## 2021-01-21 RX ADMIN — EPOETIN ALFA-EPBX 8000 UNITS: 4000 INJECTION, SOLUTION INTRAVENOUS; SUBCUTANEOUS at 18:46

## 2021-01-21 RX ADMIN — ISOSORBIDE MONONITRATE 60 MG: 60 TABLET, EXTENDED RELEASE ORAL at 08:46

## 2021-01-21 RX ADMIN — SODIUM CHLORIDE: 9 INJECTION, SOLUTION INTRAVENOUS at 18:47

## 2021-01-21 RX ADMIN — PREGABALIN 75 MG: 75 CAPSULE ORAL at 08:46

## 2021-01-21 RX ADMIN — MICONAZOLE NITRATE: 2 POWDER TOPICAL at 08:47

## 2021-01-21 RX ADMIN — ASPIRIN 81 MG: 81 TABLET, COATED ORAL at 08:46

## 2021-01-21 RX ADMIN — ATORVASTATIN CALCIUM 40 MG: 40 TABLET, FILM COATED ORAL at 08:46

## 2021-01-21 RX ADMIN — FLUTICASONE PROPIONATE 1 PUFF: 110 AEROSOL, METERED RESPIRATORY (INHALATION) at 20:54

## 2021-01-21 RX ADMIN — MICONAZOLE NITRATE: 2 POWDER TOPICAL at 20:41

## 2021-01-21 ASSESSMENT — PAIN SCALES - GENERAL
PAINLEVEL_OUTOF10: 0
PAINLEVEL_OUTOF10: 0

## 2021-01-21 NOTE — PROGRESS NOTES
Nephrology Progress Note    Assessment:  ESRDX  Prior Covid-19 positive  Hx CVA  Hx Viral hepatitis-C  Hx Schizohrenia  DM type-2  Hypertension      Plan: continue dialysis support control diarrhea    Patient Active Problem List:     Coronary artery disease involving native heart with angina pectoris (HCC)     Schizophrenia, paranoid, chronic     Metabolic syndrome     Vitamin B 12 deficiency     Cerebral microvascular disease     Mixed hyperlipidemia     Other hammer toe (acquired)     Vitamin D insufficiency     Incontinence of urine     Diabetic nephropathy with proteinuria (HCC)     Essential hypertension     History of type C viral hepatitis     Urinary incontinence due to cognitive impairment     History of seizures     Stented coronary artery-plan is to stay on Plavix indefinately per Dr Sloan Doran     Hemiparesis, left ([address]     Angina, class II ([address]     Chronic painful diabetic neuropathy ([address]     Tardive dyskinesia     Shortness of breath     Uncontrolled type 2 diabetes mellitus with hyperglycemia (HCC)     Diastolic congestive heart failure (HCC)     Sleep apnea     Pulmonary hypertension (HCC)     Class 2 severe obesity with serious comorbidity and body mass index (BMI) of 36.0 to 36.9 in adult [facility])     Edema     Closed supracondylar fracture of right humerus     Other chronic pain     Palliative care patient     Chest pain     Recurrent falls     Renal failure     Difficulty in walking     ESRD (end stage renal disease) on dialysis (HCC)     Weakness     Moderate persistent asthma without complication     Thrush     COVID-19     Post PTCA     Falls frequently      Subjective:  Admit Date: 1/19/2021    Interval History: weakness No SOB    Medications:  Scheduled Meds:   magnesium oxide  400 mg Oral Daily    aspirin  81 mg Oral Daily    atorvastatin  40 mg Oral Daily    amLODIPine  10 mg Oral Daily    isosorbide mononitrate  60 mg Oral Daily    sertraline  50 mg Oral Daily    ARIPiprazole  5 mg Oral Daily    pantoprazole  20 mg Oral Daily    ranolazine  500 mg Oral BID    insulin glargine  45 Units Subcutaneous Nightly    metoprolol tartrate  12.5 mg Oral BID    pregabalin  75 mg Oral Daily    ticagrelor  90 mg Oral BID    fluticasone  1 puff Inhalation BID    miconazole   Topical BID    insulin lispro  0.08 Units/kg Subcutaneous TID WC    insulin lispro  0-6 Units Subcutaneous TID WC    insulin lispro  0-3 Units Subcutaneous Nightly    pill splitter   Does not apply Once    cholestyramine light  4 g Oral BID    zinc sulfate  50 mg Oral Daily    ascorbic acid  500 mg Oral BID    [Held by provider] heparin (porcine)  5,000 Units Subcutaneous 3 times per day     Continuous Infusions:   dextrose      sodium chloride         CBC:   Recent Labs     01/20/21  0704 01/21/21  0639   WBC 5.6 7.9   HGB 8.7* 8.4*    196     CMP:    Recent Labs     01/19/21  1630 01/20/21  0704 01/21/21  0639   * 132* 130*   K 4.0 4.0 3.9   CL 90* 91* 88*   CO2 29 27 25   BUN 22 29* 35*   CREATININE 3.16* 4.02* 4.88*   GLUCOSE 159* 268* 159*   CALCIUM 9.3 9.6 9.5   LABGLOM 14.8* 11.2* 9.0*     Troponin: No results for input(s): TROPONINI in the last 72 hours. BNP: No results for input(s): BNP in the last 72 hours. INR:   Recent Labs     01/19/21  1630   INR 1.1     Lipids: No results for input(s): CHOL, LDLDIRECT, TRIG, HDL, AMYLASE, LIPASE in the last 72 hours. Liver:   Recent Labs     01/19/21  1630   AST 34   ALT 16   ALKPHOS 169*   PROT 7.4   LABALBU 4.0   BILITOT 0.6     Iron:  No results for input(s): IRONS, FERRITIN in the last 72 hours. Invalid input(s): LABIRONS  Urinalysis: No results for input(s): UA in the last 72 hours.     Objective:  Vitals: BP (!) 123/54   Pulse 65   Temp 97.9 °F (36.6 °C) (Oral)   Resp 16   Ht 5' 7\" (1.702 m)   Wt 213 lb (96.6 kg)   LMP  (LMP Unknown)   SpO2 100%   BMI 33.36 kg/m²    Wt Readings from Last 3 Encounters:   01/19/21 213 lb (96.6 kg)   01/12/21 207 lb (93.9 kg)   01/03/21 207 lb (93.9 kg)      24HR INTAKE/OUTPUT:  No intake or output data in the 24 hours ending 01/21/21 1553    General: alert, in no apparent distress  HEENT: normocephalic, atraumatic, anicteric  Neck: supple, no mass  Lungs: non-labored respirations, clear to auscultation bilaterally  Heart: regular rate and rhythm, 1/67 systol;ic murmurs or rubs  Abdomen: soft, non-tender, non-distended  Ext: no cyanosis, no peripheral edema left arm accss  Neuro: alert and oriented, no gross abnormalities  Psych: normal mood and affect  Skin: no rash      Electronically signed by Kelby Dowling MD

## 2021-01-21 NOTE — PROGRESS NOTES
 Schizophrenia, paranoid, chronic     Metabolic syndrome     Mixed hyperlipidemia 2010    Other hammer toe (acquired) 2007        Past Medical History:   Diagnosis Date    Anxiety     CAD S/P percutaneous coronary angioplasty ,     stents per dr Ramon Paz    CHF (congestive heart failure) ([address]     CKD (chronic kidney disease) stage 4, GFR 15-29 ml/min ([address] 2018    CKD stage 4 due to type 2 diabetes mellitus ([address]     COPD (chronic obstructive pulmonary disease) ([address]     Diabetic nephropathy with proteinuria ([address]     DJD (degenerative joint disease) of knee     Dr Cathy Patel GERD (gastroesophageal reflux disease)     Hemiparesis, left ([address] 2013    [facility] Assisted Living ([facility])    Hemodialysis patient [facility])     History of heart failure     History of seizures     History of type C viral hepatitis     HTN (hypertension)     Hyperlipidemia     Impaired mobility and activities of daily living     Mediastinal lymphadenopathy     [facility]    Metabolic syndrome     Moderate persistent asthma without complication     Neurogenic urinary incontinence 2013    Neuropathy in diabetes ([address]     Obesity (BMI 30-39. 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 Mariposa Wolfe PLACEMENT      x1 Dr. Soila Alpers, Dr Manfred Schafer CATH LAB PROCEDURE  10/02/2019    HYSTERECTOMY, TOTAL ABDOMINAL      one ovary intact, Dr Flaco Borja, menorrhagia    DE TOTAL KNEE ARTHROPLASTY Left 6/21/2018    LEFT KNEE TOTAL KNEE ARTHROPLASTY, SHAYNA, NERVE BLOCK performed by Samra Rose MD at [facility] Right     TOTAL KNEE ARTHROPLASTY  05/19/16    Dr Jiang Lung TUNNELED [address] Right 07/01/2020    tunneled HD catheter per Dr Chasity Bliss: Yes  Patient assessed for rehabilitation services?: Yes  Family / Caregiver Present: No  General Comment  Comments: Pt laying in bed. Agreeable to PT eval    Restrictions:  Restrictions/Precautions: Fall Risk, Isolation, Contact Precautions(high fall risk per Lawler score; COVID19 + and cdiff r/o)     SUBJECTIVE: Subjective: Pt reports recent fall at home and difficulty ambulating and negotiating stairs just prior to admission. Pain  Pre Treatment Pain Screening  Pain at present: 0  Scale Used: Numeric Score    Post Treatment Pain Screening:   Pain Screening  Patient Currently in Pain: No  Pain Assessment  Pain Level: 0    Prior Level of Function:  Social/Functional History  Lives With: Daughter, Family(son in law and grand children)  Home Layout: Two level, 1/2 bath on main level, Able to Live on Main level with bedroom/bathroom(Tub shower on second floor.   Pt bathes upstairs 12 steps up with handrail)  Home Access: Ramped entrance  Bathroom Shower/Tub: Tub/Shower unit  Home Equipment: Rolling walker(rollator)  Receives Help From: Family  ADL Assistance: Needs assistance  Bath: Minimal assistance  Homemaking Responsibilities: No  Ambulation Assistance: Independent(rollator PRN)  Transfer Assistance: Independent  Active : No  Additional Comments: Pt reports that she is never at home alone    OBJECTIVE:   Vision: Impaired  Vision Exceptions: Wears glasses for reading(pt states she has cataracts)  Hearing: Within functional limits    Cognition:  Overall Orientation Status: Within Functional Limits  Orientation Level: Oriented to situation, Oriented to person, Oriented to place, Oriented to time  Follows Commands: Within Functional Limits    Observation/Palpation  Posture: Fair(forward head, rounded shoulders, increased Tkyphosis, flexed posture)  Observation: pleasant, cooperative, no acute distress noted, on RA    ROM:  RLE General PROM: decreased hip flexor and HS flexibility noted  RLE AROM: WFL  LLE General PROM: decreased hip flexor and HS flexibility noted  LLE AROM : WFL    Strength:  Strength RLE  Comment: hip 3+/5, knee 4/5, knee 4/5  Strength LLE  Comment: hip 3+/5, knee 4/5, knee 4/5    Neuro:  Balance  Posture: Fair  Sitting - Static: Good  Sitting - Dynamic: Good;+  Standing - Static: Fair;+(stood with no UE support 1 min)  Standing - Dynamic: Fair     Tone RLE  RLE Tone: Normotonic  Tone LLE  LLE Tone: Normotonic  Motor Control  Gross Motor?: WFL  Sensation  Overall Sensation Status: Impaired  Additional Comments: pt reports neuropathy in B hands and feet    Bed mobility  Supine to Sit: Stand by assistance  Sit to Supine: Minimal assistance  Scooting: Stand by assistance  Comment: increased time and effort    Transfers  Sit to Stand: Stand by assistance  Stand to sit: Stand by assistance    Ambulation  Ambulation?: Yes  Ambulation 1  Surface: level tile  Device: Rolling Walker  Assistance: Contact guard assistance  Gait Deviations: Increased JAKE; Decreased step length  Distance: 10ft x 4  Comments: pt requires standing rest breaks d/t fatigue    Activity Tolerance  Activity Tolerance: Patient Tolerated treatment well  Activity Tolerance: Pt with bleeding noted from recent \"belly shot\". sterile gauze held until bleeding stopped, gown and sheets changed.  RN notified and aware      PT Education  PT Education: Goals;PT Role;Plan of Care    ASSESSMENT:   Body structures, Functions, Activity limitations: Decreased functional mobility ; Decreased safe awareness;Decreased strength;Decreased balance;Decreased endurance;Decreased posture  Decision Making: Medium Complexity  History: high  Exam: high  Clinical Presentation: med    Prognosis: Good  Barriers to Learning: none    DISCHARGE RECOMMENDATIONS:  Discharge Recommendations: Continue to assess pending progress, Patient would benefit from continued therapy after discharge    Assessment: Pt demonstrates the above deficits and decline in functional mobility status placing them at increased risk for falls. Pt would benefit from physical therapy to address above deficits and allow for safe return home at highest level of function, decrease risk for falls, and improve QOL.     REQUIRES PT FOLLOW UP: Yes      PLAN OF CARE:  Plan  Times per week: 3-6  Current Treatment Recommendations: Functional Mobility Training, Strengthening, Neuromuscular Re-education, Home Exercise Program, Equipment Evaluation, Education, & procurement, Modalities, Safety Education & Training, Gait Training, Transfer Training, Stair training, Endurance Training, Balance Training, Patient/Caregiver Education & Training, Positioning  Safety Devices  Type of devices: Left in bed, Call light within reach, Bed alarm in place    Goals:  Patient goals : \"get therapy, stronger and walk better so I don't fall\"  Long term goals  Long term goal 1: Bed mobility with indep  Long term goal 2: Functional transfers with indep  Long term goal 3: Amb 50ft with LRAD and indep  Long term goal 4: 4 steps x 3 reps with SBA to navigate home environement    [facility] (6 CLICK) Camille [address] Raw Score : 17    Therapy Time:   Individual   Time In 0902   Time Out 0922   Minutes Moralesallisonastrid Paresh[address] [place] [address], 01/21/21 at 9:32 AM       Definitions for assistance levels  Independent = pt does not require any physical supervision or assistance from another person for activity completion. Device may be needed.   Stand by assistance = pt requires verbal cues or instructions from another person, close to but not touching, to perform the activity  Minimal assistance= pt performs 75% or more of the activity; assistance is required to complete the activity  Moderate assistance= pt performs 50% of the activity; assistance is required to complete the activity  Maximal assistance = pt performs 25% of the activity; assistance is required to complete the activity  Dependent = pt requires total physical assistance to accomplish the task

## 2021-01-21 NOTE — CARE COORDINATION
Social work note: Spoke with patient to let her know that LSW is awaiting info from [email or web address]. Electronically signed by HIPOLITO Fatima on 1/21/2021 at 12:31 PM     1425: Attempt made to verify pt's dialysis treatment time at [facility] as the [employer] on [employer] who take COVID+ reports they will not have her on [facility] Dr. Jennifer Lamar reports they have to call LSW back as they have to clarify information due to pt's COVID status. Electronically signed by HIPOLITO Fatima on 1/21/2021 at 2:30 PM     1435: Call back from [facility] on [employer]. Pt's chair time is T/Th/Sa at [facility] in [address] [place] [address] time is 12/45pm. Electronically signed by HIPOLITO Fatima on 1/21/2021 at 2:37 PM     25 [id number]: [id number] completed.  Electronically signed by HIPOLITO Fatima on 1/21/2021 at 4:06 PM

## 2021-01-21 NOTE — PROGRESS NOTES
Hospitalist Progress Note      PCP: Barbra Lyle MD    Date of Admission: 1/19/2021    Chief Complaint:  No acute events, afebrile, stable HD, was bleeding from [facility] heparin injection site per nursing staff    Medications:  Reviewed    Infusion Medications    dextrose      sodium chloride       Scheduled Medications    magnesium oxide  400 mg Oral Daily    aspirin  81 mg Oral Daily    atorvastatin  40 mg Oral Daily    amLODIPine  10 mg Oral Daily    isosorbide mononitrate  60 mg Oral Daily    sertraline  50 mg Oral Daily    ARIPiprazole  5 mg Oral Daily    pantoprazole  20 mg Oral Daily    ranolazine  500 mg Oral BID    insulin glargine  45 Units Subcutaneous Nightly    metoprolol tartrate  12.5 mg Oral BID    pregabalin  75 mg Oral Daily    ticagrelor  90 mg Oral BID    fluticasone  1 puff Inhalation BID    miconazole   Topical BID    insulin lispro  0.08 Units/kg Subcutaneous TID WC    insulin lispro  0-6 Units Subcutaneous TID WC    insulin lispro  0-3 Units Subcutaneous Nightly    pill splitter   Does not apply Once    cholestyramine light  4 g Oral BID    zinc sulfate  50 mg Oral Daily    ascorbic acid  500 mg Oral BID    heparin (porcine)  5,000 Units Subcutaneous 3 times per day     PRN Meds: ipratropium-albuterol, glucose, dextrose, glucagon (rDNA), dextrose, albuterol-ipratropium, ondansetron, promethazine **OR** [DISCONTINUED] ondansetron, polyethylene glycol, acetaminophen **OR** acetaminophen    No intake or output data in the 24 hours ending 01/21/21 1339    Exam:    BP (!) 123/54   Pulse 65   Temp 97.9 °F (36.6 °C) (Oral)   Resp 16   Ht 5' 7\" (1.702 m)   Wt 213 lb (96.6 kg)   LMP  (LMP Unknown)   SpO2 100%   BMI 33.36 kg/m²     General appearance: appears stated age and cooperative. Respiratory: Clear to auscultation bilaterally   Cardiovascular: regular rate and rhythm, S1/S2 .   Abdomen: Soft, active bowel sounds, hematoma present in the LLQ.  Musculoskeletal: No edema bilaterally. Labs:   Recent Labs     01/19/21  1630 01/20/21  0704 01/21/21  0639   WBC 8.4 5.6 7.9   HGB 9.6* 8.7* 8.4*   HCT 27.0* 25.1* 24.4*    175 196     Recent Labs     01/19/21  1630 01/20/21  0704 01/21/21  0639   * 132* 130*   K 4.0 4.0 3.9   CL 90* 91* 88*   CO2 29 27 25   BUN 22 29* 35*   CREATININE 3.16* 4.02* 4.88*   CALCIUM 9.3 9.6 9.5   PHOS  --  4.6 4.8     Recent Labs     01/19/21  1630   AST 34   ALT 16   BILITOT 0.6   ALKPHOS 169*     Recent Labs     01/19/21  1630   INR 1.1     No results for input(s): CKTOTAL, TROPONINI in the last 72 hours. Urinalysis:      Lab Results   Component Value Date    NITRU Negative 12/25/2020    WBCUA 0-2 12/25/2020    BACTERIA Negative 12/25/2020    RBCUA 0-2 12/25/2020    BLOODU Negative 12/25/2020    SPECGRAV 1.020 12/25/2020    GLUCOSEU 500 12/25/2020       Radiology:  CT Head WO Contrast   Final Result   FINAL IMPRESSION:      NO ACUTE INTRACRANIAL PROCESS, FRACTURE, OR EVIDENCE OF CERVICAL SPINE INJURY IDENTIFIED. MODERATELY EXTENSIVE CERVICAL SPONDYLOSIS, NOT SIGNIFICANTLY CHANGED FROM 10/8/2017. CT CERVICAL SPINE WO CONTRAST   Final Result   FINAL IMPRESSION:      NO ACUTE INTRACRANIAL PROCESS, FRACTURE, OR EVIDENCE OF CERVICAL SPINE INJURY IDENTIFIED. MODERATELY EXTENSIVE CERVICAL SPONDYLOSIS, NOT SIGNIFICANTLY CHANGED FROM 10/8/2017. CT THORACIC SPINE WO CONTRAST   Final Result   FINAL IMPRESSION:       NO ACUTE FRACTURES OR POSTTRAUMATIC COMPLICATION IDENTIFIED. BORDERLINE WALL THICKENING AND/OR FLUID AROUND AN OTHERWISE UNREMARKABLE. CHRONIC FINDINGS, AS NOTED. CT CHEST W CONTRAST   Final Result   FINAL IMPRESSION:       NO ACUTE FRACTURES OR POSTTRAUMATIC COMPLICATION IDENTIFIED. BORDERLINE WALL THICKENING AND/OR FLUID AROUND AN OTHERWISE UNREMARKABLE. CHRONIC FINDINGS, AS NOTED.             CT ABDOMEN PELVIS W IV CONTRAST Additional Contrast? None   Final Result   FINAL IMPRESSION:       NO ACUTE FRACTURES OR POSTTRAUMATIC COMPLICATION IDENTIFIED. BORDERLINE WALL THICKENING AND/OR FLUID AROUND AN OTHERWISE UNREMARKABLE. CHRONIC FINDINGS, AS NOTED. CT LUMBAR SPINE WO CONTRAST   Final Result   FINAL IMPRESSION:       NO ACUTE FRACTURES OR POSTTRAUMATIC COMPLICATION IDENTIFIED. BORDERLINE WALL THICKENING AND/OR FLUID AROUND AN OTHERWISE UNREMARKABLE. CHRONIC FINDINGS, AS NOTED.                     Assessment/Plan:    64-year-old female with a history of ESRD dialysis Tuesday Thursday Saturday, type 2 diabetes, coronary disease with dual antiplatelet therapy, schizophrenia, CHF, who was recently managed for COVID-19 pneumonia who presented with:     Weakness and falls  - likely due to deconditioning from recent COVID infection  - continue PT/ OT     Recent COVID-19 infection  - on RA    HTN, CAD  - continue home regimen    ESRD on IHD  - management per nephrology    IDDM2 with hyperglycemia  - ISS, lantus    Anemia   - follow H/H    Diet: DIET RENAL; Carb Control: 3 carb choices (45 gms)/meal; No Added Salt (3-4 GM)    Code Status: Full Code      Disposition - SNF,  following          Electronically signed by Marline Salas MD on 1/21/2021 at 1:39 PM

## 2021-01-22 VITALS
BODY MASS INDEX: 33.43 KG/M2 | WEIGHT: 213 LBS | RESPIRATION RATE: 16 BRPM | DIASTOLIC BLOOD PRESSURE: 47 MMHG | SYSTOLIC BLOOD PRESSURE: 104 MMHG | TEMPERATURE: 97.9 F | HEART RATE: 65 BPM | OXYGEN SATURATION: 100 % | HEIGHT: 67 IN

## 2021-01-22 DIAGNOSIS — E11.40 CHRONIC PAINFUL DIABETIC NEUROPATHY (HCC): ICD-10-CM

## 2021-01-22 LAB
ANION GAP SERPL CALCULATED.3IONS-SCNC: 13 MEQ/L (ref 9–15)
BUN BLDV-MCNC: 21 MG/DL (ref 8–23)
C-REACTIVE PROTEIN, HIGH SENSITIVITY: 12.7 MG/L (ref 0–5)
CALCIUM SERPL-MCNC: 8.9 MG/DL (ref 8.5–9.9)
CHLORIDE BLD-SCNC: 90 MEQ/L (ref 95–107)
CO2: 26 MEQ/L (ref 20–31)
CREAT SERPL-MCNC: 3.88 MG/DL (ref 0.5–0.9)
FERRITIN: 795.1 NG/ML (ref 13–150)
GFR AFRICAN AMERICAN: 14.2
GFR NON-AFRICAN AMERICAN: 11.7
GLUCOSE BLD-MCNC: 127 MG/DL (ref 60–115)
GLUCOSE BLD-MCNC: 184 MG/DL (ref 60–115)
GLUCOSE BLD-MCNC: 186 MG/DL (ref 70–99)
GLUCOSE BLD-MCNC: 196 MG/DL (ref 60–115)
GLUCOSE BLD-MCNC: 217 MG/DL (ref 60–115)
HCT VFR BLD CALC: 23.5 % (ref 37–47)
HEMOGLOBIN: 8.1 G/DL (ref 12–16)
MAGNESIUM: 2.1 MG/DL (ref 1.7–2.4)
MCH RBC QN AUTO: 30.9 PG (ref 27–31.3)
MCHC RBC AUTO-ENTMCNC: 34.5 % (ref 33–37)
MCV RBC AUTO: 89.4 FL (ref 82–100)
PDW BLD-RTO: 15.5 % (ref 11.5–14.5)
PERFORMED ON: ABNORMAL
PHOSPHORUS: 4.1 MG/DL (ref 2.3–4.8)
PLATELET # BLD: 152 K/UL (ref 130–400)
POTASSIUM REFLEX MAGNESIUM: 3.9 MEQ/L (ref 3.4–4.9)
RBC # BLD: 2.63 M/UL (ref 4.2–5.4)
SODIUM BLD-SCNC: 129 MEQ/L (ref 135–144)
WBC # BLD: 6.3 K/UL (ref 4.8–10.8)

## 2021-01-22 PROCEDURE — 86141 C-REACTIVE PROTEIN HS: CPT

## 2021-01-22 PROCEDURE — 82728 ASSAY OF FERRITIN: CPT

## 2021-01-22 PROCEDURE — 36415 COLL VENOUS BLD VENIPUNCTURE: CPT

## 2021-01-22 PROCEDURE — 6370000000 HC RX 637 (ALT 250 FOR IP): Performed by: NURSE PRACTITIONER

## 2021-01-22 PROCEDURE — 85027 COMPLETE CBC AUTOMATED: CPT

## 2021-01-22 PROCEDURE — 80048 BASIC METABOLIC PNL TOTAL CA: CPT

## 2021-01-22 PROCEDURE — 6370000000 HC RX 637 (ALT 250 FOR IP): Performed by: INTERNAL MEDICINE

## 2021-01-22 PROCEDURE — 83735 ASSAY OF MAGNESIUM: CPT

## 2021-01-22 PROCEDURE — 94761 N-INVAS EAR/PLS OXIMETRY MLT: CPT

## 2021-01-22 PROCEDURE — 84100 ASSAY OF PHOSPHORUS: CPT

## 2021-01-22 PROCEDURE — 94640 AIRWAY INHALATION TREATMENT: CPT

## 2021-01-22 RX ORDER — PREGABALIN 75 MG/1
CAPSULE ORAL
Qty: 60 CAPSULE | Refills: 2 | Status: SHIPPED | OUTPATIENT
Start: 2021-01-22 | End: 2021-02-24 | Stop reason: SDUPTHER

## 2021-01-22 RX ORDER — LANOLIN ALCOHOL/MO/W.PET/CERES
3 CREAM (GRAM) TOPICAL NIGHTLY PRN
Status: DISCONTINUED | OUTPATIENT
Start: 2021-01-22 | End: 2021-01-22 | Stop reason: HOSPADM

## 2021-01-22 RX ADMIN — MICONAZOLE NITRATE: 2 POWDER TOPICAL at 10:03

## 2021-01-22 RX ADMIN — ISOSORBIDE MONONITRATE 60 MG: 60 TABLET, EXTENDED RELEASE ORAL at 09:58

## 2021-01-22 RX ADMIN — INSULIN LISPRO 8 UNITS: 100 INJECTION, SOLUTION INTRAVENOUS; SUBCUTANEOUS at 09:39

## 2021-01-22 RX ADMIN — ATORVASTATIN CALCIUM 40 MG: 40 TABLET, FILM COATED ORAL at 09:57

## 2021-01-22 RX ADMIN — PANTOPRAZOLE SODIUM 20 MG: 20 TABLET, DELAYED RELEASE ORAL at 09:57

## 2021-01-22 RX ADMIN — Medication 400 MG: at 09:57

## 2021-01-22 RX ADMIN — ARIPIPRAZOLE 5 MG: 5 TABLET ORAL at 09:57

## 2021-01-22 RX ADMIN — PREGABALIN 75 MG: 75 CAPSULE ORAL at 09:57

## 2021-01-22 RX ADMIN — OXYCODONE HYDROCHLORIDE AND ACETAMINOPHEN 500 MG: 500 TABLET ORAL at 09:57

## 2021-01-22 RX ADMIN — FLUTICASONE PROPIONATE 1 PUFF: 110 AEROSOL, METERED RESPIRATORY (INHALATION) at 08:09

## 2021-01-22 RX ADMIN — TICAGRELOR 90 MG: 90 TABLET ORAL at 09:57

## 2021-01-22 RX ADMIN — RANOLAZINE 500 MG: 500 TABLET, FILM COATED, EXTENDED RELEASE ORAL at 09:57

## 2021-01-22 RX ADMIN — INSULIN LISPRO 8 UNITS: 100 INJECTION, SOLUTION INTRAVENOUS; SUBCUTANEOUS at 12:30

## 2021-01-22 RX ADMIN — CHOLESTYRAMINE 4 G: 4 POWDER, FOR SUSPENSION ORAL at 09:58

## 2021-01-22 RX ADMIN — Medication 3 MG: at 02:02

## 2021-01-22 RX ADMIN — SERTRALINE 50 MG: 50 TABLET, FILM COATED ORAL at 09:57

## 2021-01-22 RX ADMIN — ZINC SULFATE 220 MG (50 MG) CAPSULE 50 MG: CAPSULE at 09:57

## 2021-01-22 RX ADMIN — ASPIRIN 81 MG: 81 TABLET, COATED ORAL at 09:57

## 2021-01-22 NOTE — PROGRESS NOTES
Pt still bleeding from subQ hep from last night. Heavier pressure dressing placed. perfectserved dr Jay Rdz regarding her [address]. Ok to [employer] the dose.  Not given

## 2021-01-22 NOTE — PROGRESS NOTES
Hospitalist Progress Note      PCP: Barbra Lyle MD    Date of Admission: 1/19/2021    Chief Complaint:  No acute events, afebrile, stable HD, bleeding from [place] heparin injection site stopped per nursing staff    Medications:  Reviewed    Infusion Medications    dextrose      sodium chloride       Scheduled Medications    magnesium oxide  400 mg Oral Daily    aspirin  81 mg Oral Daily    atorvastatin  40 mg Oral Daily    amLODIPine  10 mg Oral Daily    isosorbide mononitrate  60 mg Oral Daily    sertraline  50 mg Oral Daily    ARIPiprazole  5 mg Oral Daily    pantoprazole  20 mg Oral Daily    ranolazine  500 mg Oral BID    insulin glargine  45 Units Subcutaneous Nightly    metoprolol tartrate  12.5 mg Oral BID    pregabalin  75 mg Oral Daily    ticagrelor  90 mg Oral BID    fluticasone  1 puff Inhalation BID    miconazole   Topical BID    insulin lispro  0.08 Units/kg Subcutaneous TID WC    insulin lispro  0-6 Units Subcutaneous TID WC    insulin lispro  0-3 Units Subcutaneous Nightly    pill splitter   Does not apply Once    cholestyramine light  4 g Oral BID    zinc sulfate  50 mg Oral Daily    ascorbic acid  500 mg Oral BID    [Held by provider] heparin (porcine)  5,000 Units Subcutaneous 3 times per day     PRN Meds: melatonin, ipratropium-albuterol, glucose, dextrose, glucagon (rDNA), dextrose, albuterol-ipratropium, ondansetron, promethazine **OR** [DISCONTINUED] ondansetron, polyethylene glycol, acetaminophen **OR** acetaminophen    No intake or output data in the 24 hours ending 01/22/21 1306    Exam:    BP (!) 104/47   Pulse 65   Temp 97.9 °F (36.6 °C) (Oral)   Resp 16   Ht 5' 7\" (1.702 m)   Wt 213 lb (96.6 kg)   LMP  (LMP Unknown)   SpO2 100%   BMI 33.36 kg/m²     General appearance: appears stated age and cooperative. Respiratory: Clear to auscultation bilaterally   Cardiovascular: regular rate and rhythm, S1/S2 .   Abdomen: Soft, active bowel sounds, hematoma present in the LLQ. Musculoskeletal: No edema bilaterally. Labs:   Recent Labs     01/20/21  0704 01/21/21  0639 01/22/21  0638   WBC 5.6 7.9 6.3   HGB 8.7* 8.4* 8.1*   HCT 25.1* 24.4* 23.5*    196 152     Recent Labs     01/20/21  0704 01/21/21  0639 01/22/21  0638   * 130* 129*   K 4.0 3.9 3.9   CL 91* 88* 90*   CO2 27 25 26   BUN 29* 35* 21   CREATININE 4.02* 4.88* 3.88*   CALCIUM 9.6 9.5 8.9   PHOS 4.6 4.8 4.1     Recent Labs     01/19/21  1630   AST 34   ALT 16   BILITOT 0.6   ALKPHOS 169*     Recent Labs     01/19/21  1630   INR 1.1     No results for input(s): CKTOTAL, TROPONINI in the last 72 hours. Urinalysis:      Lab Results   Component Value Date    NITRU Negative 12/25/2020    WBCUA 0-2 12/25/2020    BACTERIA Negative 12/25/2020    RBCUA 0-2 12/25/2020    BLOODU Negative 12/25/2020    SPECGRAV 1.020 12/25/2020    GLUCOSEU 500 12/25/2020       Radiology:  CT Head WO Contrast   Final Result   FINAL IMPRESSION:      NO ACUTE INTRACRANIAL PROCESS, FRACTURE, OR EVIDENCE OF CERVICAL SPINE INJURY IDENTIFIED. MODERATELY EXTENSIVE CERVICAL SPONDYLOSIS, NOT SIGNIFICANTLY CHANGED FROM 10/8/2017. CT CERVICAL SPINE WO CONTRAST   Final Result   FINAL IMPRESSION:      NO ACUTE INTRACRANIAL PROCESS, FRACTURE, OR EVIDENCE OF CERVICAL SPINE INJURY IDENTIFIED. MODERATELY EXTENSIVE CERVICAL SPONDYLOSIS, NOT SIGNIFICANTLY CHANGED FROM 10/8/2017. CT THORACIC SPINE WO CONTRAST   Final Result   FINAL IMPRESSION:       NO ACUTE FRACTURES OR POSTTRAUMATIC COMPLICATION IDENTIFIED. BORDERLINE WALL THICKENING AND/OR FLUID AROUND AN OTHERWISE UNREMARKABLE. CHRONIC FINDINGS, AS NOTED. CT CHEST W CONTRAST   Final Result   FINAL IMPRESSION:       NO ACUTE FRACTURES OR POSTTRAUMATIC COMPLICATION IDENTIFIED. BORDERLINE WALL THICKENING AND/OR FLUID AROUND AN OTHERWISE UNREMARKABLE. CHRONIC FINDINGS, AS NOTED.             CT ABDOMEN PELVIS W IV CONTRAST Additional Contrast? None   Final Result   FINAL IMPRESSION:       NO ACUTE FRACTURES OR POSTTRAUMATIC COMPLICATION IDENTIFIED. BORDERLINE WALL THICKENING AND/OR FLUID AROUND AN OTHERWISE UNREMARKABLE. CHRONIC FINDINGS, AS NOTED. CT LUMBAR SPINE WO CONTRAST   Final Result   FINAL IMPRESSION:       NO ACUTE FRACTURES OR POSTTRAUMATIC COMPLICATION IDENTIFIED. BORDERLINE WALL THICKENING AND/OR FLUID AROUND AN OTHERWISE UNREMARKABLE. CHRONIC FINDINGS, AS NOTED. Assessment/Plan:    56-year-old female with a history of ESRD dialysis Tuesday Thursday Saturday, type 2 diabetes, coronary disease with dual antiplatelet therapy, schizophrenia, CHF, who was recently managed for COVID-19 pneumonia who presented with:     Weakness and falls  - likely due to deconditioning from recent COVID infection  - continue PT/ OT     Recent COVID-19 infection  - on RA, asymptomatic    HTN, CAD  - continue home regimen    ESRD on IHD  - management per nephrology    IDDM2 with hyperglycemia  - ISS, lantus    Anemia   - H/H remained stable    Diet: DIET RENAL; Carb Control: 3 carb choices (45 gms)/meal; No Added Salt (3-4 GM);  Safety Tray; Safety Tray (Disposables)    Code Status: Full Code      Disposition - SNF today          Electronically signed by Daniel Sparrow MD on 1/22/2021 at 1:06 PM

## 2021-01-22 NOTE — PROGRESS NOTES
Contacted Willian Brown- this patients designated contact at 1133 AM. No answer- left message. Will try again later.      Electronically signed by Roman Maldonado RN on 1/22/2021 at 11:57 AM

## 2021-01-22 NOTE — DISCHARGE INSTR - COC
Continuity of Care Form    Patient Name: Kimberlee Severs   :  1960  MRN:  [id number]    Admit date:  2021  Discharge date:  21    Code Status Order: Full Code   Advance Directives:      Admitting Physician:  Jolanta Schmitz DO  PCP: Lucio Muse MD    Discharging Nurse: [facility] Unit/Room#: T797/D492-07  Discharging Unit Phone Number: [phone]    Emergency Contact:   Extended Emergency Contact Information  Primary Emergency Contact: Lexx Fuller [address] Phone: [phone]  Work Phone: [phone]  Mobile Phone: [phone]  Relation: Child    Past Surgical History:  Past Surgical History:   Procedure Laterality Date     SECTION      x1    COLONOSCOPY  2014    Dr. Janine Carballo      x1 Dr. Yovani Owens, Dr Kapil Clifford CATH LAB PROCEDURE  10/02/2019    HYSTERECTOMY, TOTAL ABDOMINAL      one ovary intact, Dr Selena Yepez, menorrhagia    [facility] Left 2018    LEFT KNEE TOTAL KNEE ARTHROPLASTY, SHAYNA, NERVE BLOCK performed by Ricardo Arevalo MD at [address] [place] [address] Right     TOTAL KNEE ARTHROPLASTY  16    Dr Quentin Hargrove TUNNELED [address] Right 2020    tunneled HD catheter per Dr Delores Dailey       Immunization History:   Immunization History   Administered Date(s) Administered    Influenza Vaccine, unspecified formulation 10/14/2016    Influenza Virus Vaccine 10/20/2014, 10/30/2015, 10/14/2016, 2017, 10/12/2018    Influenza Whole 10/20/2014    Influenza, Quadv, IM, (6 mo and older Fluzone, Flulaval, Fluarix and 3 yrs and older Afluria) 2017, 10/12/2018    Influenza, Quadv, IM, PF (6 mo and older Fluzone, Flulaval, Fluarix, and 3 yrs and older Afluria) 10/03/2019    Influenza, Triv, 3 Years and older, IM (Afluria (5 yrs and older) 10/14/2016    Pneumococcal Polysaccharide (Pyygiazhy52) 10/15/2016    Tdap (Boostrix, Adacel) 08/03/2020       Active Problems:  Patient Active Problem List   Diagnosis Code    Coronary artery disease involving native heart with angina pectoris ([facility]) I25.119    Schizophrenia, paranoid, chronic D62.2    Metabolic syndrome Y62.75    Vitamin B 12 deficiency E53.8    Cerebral microvascular disease I67.89    Mixed hyperlipidemia E78.2    Other hammer toe (acquired) M20.40    Vitamin D insufficiency E55.9    Incontinence of urine R32    Diabetic nephropathy with proteinuria ([address] E11.21    Essential hypertension I10    History of type C viral hepatitis Z86.19    Urinary incontinence due to cognitive impairment R39.81    History of seizures Z87.898    Stented coronary artery-plan is to stay on Plavix indefinately per Dr Kodak Eng Z95.5    Hemiparesis, left ([address] G81.94    Angina, class II ([address] I20.9    Chronic painful diabetic neuropathy ([address] E11.40    Tardive dyskinesia G24.01    Shortness of breath R06.02    Uncontrolled type 2 diabetes mellitus with hyperglycemia ([facility]) U34.16    Diastolic congestive heart failure ([facility]) I50.30    Sleep apnea G47.30    Pulmonary hypertension ([facility]) I27.20    Class 2 severe obesity with serious comorbidity and body mass index (BMI) of 36.0 to 36.9 in adult ([facility]) E66.01, Z68.36    Edema R60.9    Closed supracondylar fracture of right humerus S42.411A    Other chronic pain G89.29    Palliative care patient Z51.5    Chest pain R07.9    Recurrent falls R29.6    Renal failure N19    Difficulty in walking R26.2    ESRD (end stage renal disease) on dialysis ([facility]) N18.6, Z99.2    Weakness R53.1    Moderate persistent asthma without complication D11.63    Thrush B37.0    COVID-19 U07.1    Post PTCA Z98.61    Falls frequently R29.6       Isolation/Infection:   Isolation            C Diff Contact          Patient Infection Status       Infection Onset Added Last Indicated Last Indicated By Review Planned Expiration Resolved Resolved By    COVID-19  01/13/21 01/19/21 COVID-19 01/26/21 02/02/21      Positive 1/12/21. Electronically signed by Yanci De Leon RN on 1/13/21 at 7:22 AM EST       Resolved    C-diff Rule Out 01/20/21 01/20/21 01/20/21 Gastrointestinal Panel by DNA (Ordered)   01/22/21 Delonte Almeida RN    Order discontinued    COVID-19 12/25/20 12/25/20 01/12/21 COVID-19   01/13/21 Yanci De Leon RN    COVID-19 Rule Out 12/25/20 12/25/20 12/25/20 COVID-19 (Ordered)   12/25/20 Rule-Out Test Resulted    COVID-19 Rule Out 07/02/20 07/02/20 07/02/20 COVID-19 (Ordered)   07/02/20 Rule-Out Test Resulted    COVID-19 Rule Out 06/29/20 06/29/20 06/29/20 COVID-19 (Ordered)   06/29/20 Rule-Out Test Resulted    COVID-19 Rule Out 06/28/20 06/28/20 06/28/20 COVID-19 (Ordered)   06/28/20 Rule-Out Test Resulted    COVID-19 Rule Out 06/14/20 06/14/20 06/14/20 COVID-19 (Ordered)   06/14/20 Rule-Out Test Resulted            Nurse Assessment:  Last Vital Signs: BP (!) 104/47   Pulse 65   Temp 97.9 °F (36.6 °C) (Oral)   Resp 16   Ht 5' 7\" (1.702 m)   Wt 213 lb (96.6 kg)   LMP  (LMP Unknown)   SpO2 100%   BMI 33.36 kg/m²     Last documented pain score (0-10 scale): Pain Level: 0  Last Weight:   Wt Readings from Last 1 Encounters:   01/19/21 213 lb (96.6 kg)     Mental Status:  alert    IV Access:  Fistula    Nursing Mobility/ADLs:  Walking   Independent  Transfer  Independent  Bathing  Assisted  Dressing  Independent  Toileting  Assisted  Feeding  Independent  Med Admin  Assisted  Med Delivery   whole    Wound Care Documentation and Therapy:  Wound 06/28/20 Arm Distal;Left;Lower red, open  (Active)   Number of days: 207        Elimination:  Continence:   · Bowel: Yes  · Bladder: Yes  Urinary Catheter: None   Colostomy/Ileostomy/Ileal Conduit: No       Date of Last BM: 1/21/21  No intake or output data in the 24 hours ending 01/22/21 1240  No intake/output data recorded.     Safety Concerns: Electronically signed by Claudio Diehl MD on 1/22/21 at 1:13 PM EST

## 2021-01-22 NOTE — CARE COORDINATION
Social work note: [facility] set up for [facility] via cot. Dtr, pt and liaison updated. RN aware.  Electronically signed by HIPOLITO Lezama on 1/22/2021 at 12:45 PM

## 2021-01-22 NOTE — DISCHARGE SUMMARY
reversal of the normal cervical lordosis with moderately extensive degenerative changes are not significantly changed from the prior study. FINAL IMPRESSION: NO ACUTE INTRACRANIAL PROCESS, FRACTURE, OR EVIDENCE OF CERVICAL SPINE INJURY IDENTIFIED. MODERATELY EXTENSIVE CERVICAL SPONDYLOSIS, NOT SIGNIFICANTLY CHANGED FROM 10/8/2017. Ct Chest W Contrast    Result Date: 1/19/2021  CT CHEST W CONTRAST, CT ABDOMEN PELVIS W IV CONTRAST, CT LUMBAR SPINE WO CONTRAST, CT THORACIC SPINE WO CONTRAST : 1/19/2021 CLINICAL HISTORY:  chest pain post fall . COMPARISON: CT abdomen pelvis 10/8/2017 and chest CT 12/16/2014. TECHNIQUE: Spiral images were obtained of the chest, abdomen and pelvis after the uneventful intravenous administration of approximately 100 mL of Isovue-370 contrast. Routine multiplanar reformatted reconstructions were obtained; including dedicated thoracic and lumbar spine reconstructions. All CT scans at this facility use dose modulation, iterative reconstruction, and/or weight based dosing when appropriate to reduce radiation dose to as low as reasonably achievable. CHEST CT FINDINGS: There are no displaced fractures, significant pulmonary contusion, evidence of great vessel injury, significant hematoma, pneumothorax, pleural or pericardial effusion. Mild subpleural interstitial lung disease of the mid to lower lung fields has mildly progressed from 12/16/2014. Mild mediastinal and hilar lymphadenopathy is probably reactive. ABDOMEN AND PELVIS CT FINDINGS: There is no evidence of solid organ injury, displaced fractures, or significant hematoma. Borderline wall thickening and/or fluid is noted around an otherwise unremarkable. The liver, spleen, pancreas, adrenal glands, kidneys, great vessels, unopacified bowel loops, urinary bladder, and additional images of pelvis are unremarkable.  THORACIC SPINE CT FINDINGS: An old mild to moderate compression fractures of T11 and mild degenerative changes of the thoracic spine are present. There are no acute fractures, dislocation or acute paraspinous soft tissue abnormalities identified. LUMBAR SPINE CT FINDINGS: There is no fracture, dislocation, or acute paraspinous soft tissue abnormalities identified. Mild degenerative changes are noted. FINAL IMPRESSION: NO ACUTE FRACTURES OR POSTTRAUMATIC COMPLICATION IDENTIFIED. BORDERLINE WALL THICKENING AND/OR FLUID AROUND AN OTHERWISE UNREMARKABLE. CHRONIC FINDINGS, AS NOTED. Ct Cervical Spine Wo Contrast    Result Date: 1/19/2021  CT HEAD WO CONTRAST, CT CERVICAL SPINE WO CONTRAST: 1/19/2021 CLINICAL HISTORY:  fall with head injury . COMPARISON: Head CT 1/3/2021 and cervical spine CT 10/8/2017. TECHNIQUE: ROUTINE All CT scans at this facility use dose modulation, iterative reconstruction, and/or weight based dosing when appropriate to reduce radiation dose to as low as reasonably achievable. HEAD CT FINDINGS: There is no intracranial hemorrhage, mass effect, midline shift, extra-axial collection, hydrocephalus, evidence of a recent or remote ischemic infarct or skull fracture identified. Mild generalized cerebral volume loss and mild white matter changes are again noted. Chronic opacification of the paranasal sinuses appears unchanged. CERVICAL SPINE CT FINDINGS: The spine is visualized from the craniovertebral junction through the T3-4  level. There is no fracture, dislocation, or acute paraspinous soft tissue abnormalities identified. Moderate reversal of the normal cervical lordosis with moderately extensive degenerative changes are not significantly changed from the prior study. FINAL IMPRESSION: NO ACUTE INTRACRANIAL PROCESS, FRACTURE, OR EVIDENCE OF CERVICAL SPINE INJURY IDENTIFIED. MODERATELY EXTENSIVE CERVICAL SPONDYLOSIS, NOT SIGNIFICANTLY CHANGED FROM 10/8/2017.      Ct Thoracic Spine Wo Contrast    Result Date: 1/19/2021  CT CHEST W CONTRAST, CT ABDOMEN PELVIS W IV CONTRAST, CT LUMBAR SPINE WO CONTRAST, CT THORACIC SPINE WO CONTRAST : 1/19/2021 CLINICAL HISTORY:  chest pain post fall . COMPARISON: CT abdomen pelvis 10/8/2017 and chest CT 12/16/2014. TECHNIQUE: Spiral images were obtained of the chest, abdomen and pelvis after the uneventful intravenous administration of approximately 100 mL of Isovue-370 contrast. Routine multiplanar reformatted reconstructions were obtained; including dedicated thoracic and lumbar spine reconstructions. All CT scans at this facility use dose modulation, iterative reconstruction, and/or weight based dosing when appropriate to reduce radiation dose to as low as reasonably achievable. CHEST CT FINDINGS: There are no displaced fractures, significant pulmonary contusion, evidence of great vessel injury, significant hematoma, pneumothorax, pleural or pericardial effusion. Mild subpleural interstitial lung disease of the mid to lower lung fields has mildly progressed from 12/16/2014. Mild mediastinal and hilar lymphadenopathy is probably reactive. ABDOMEN AND PELVIS CT FINDINGS: There is no evidence of solid organ injury, displaced fractures, or significant hematoma. Borderline wall thickening and/or fluid is noted around an otherwise unremarkable. The liver, spleen, pancreas, adrenal glands, kidneys, great vessels, unopacified bowel loops, urinary bladder, and additional images of pelvis are unremarkable. THORACIC SPINE CT FINDINGS: An old mild to moderate compression fractures of T11 and mild degenerative changes of the thoracic spine are present. There are no acute fractures, dislocation or acute paraspinous soft tissue abnormalities identified. LUMBAR SPINE CT FINDINGS: There is no fracture, dislocation, or acute paraspinous soft tissue abnormalities identified. Mild degenerative changes are noted. FINAL IMPRESSION: NO ACUTE FRACTURES OR POSTTRAUMATIC COMPLICATION IDENTIFIED. BORDERLINE WALL THICKENING AND/OR FLUID AROUND AN OTHERWISE UNREMARKABLE. CHRONIC FINDINGS, AS NOTED.      Ct Lumbar Spine Wo Contrast    Result Date: 1/19/2021  CT CHEST W CONTRAST, CT ABDOMEN PELVIS W IV CONTRAST, CT LUMBAR SPINE WO CONTRAST, CT THORACIC SPINE WO CONTRAST : 1/19/2021 CLINICAL HISTORY:  chest pain post fall . COMPARISON: CT abdomen pelvis 10/8/2017 and chest CT 12/16/2014. TECHNIQUE: Spiral images were obtained of the chest, abdomen and pelvis after the uneventful intravenous administration of approximately 100 mL of Isovue-370 contrast. Routine multiplanar reformatted reconstructions were obtained; including dedicated thoracic and lumbar spine reconstructions. All CT scans at this facility use dose modulation, iterative reconstruction, and/or weight based dosing when appropriate to reduce radiation dose to as low as reasonably achievable. CHEST CT FINDINGS: There are no displaced fractures, significant pulmonary contusion, evidence of great vessel injury, significant hematoma, pneumothorax, pleural or pericardial effusion. Mild subpleural interstitial lung disease of the mid to lower lung fields has mildly progressed from 12/16/2014. Mild mediastinal and hilar lymphadenopathy is probably reactive. ABDOMEN AND PELVIS CT FINDINGS: There is no evidence of solid organ injury, displaced fractures, or significant hematoma. Borderline wall thickening and/or fluid is noted around an otherwise unremarkable. The liver, spleen, pancreas, adrenal glands, kidneys, great vessels, unopacified bowel loops, urinary bladder, and additional images of pelvis are unremarkable. THORACIC SPINE CT FINDINGS: An old mild to moderate compression fractures of T11 and mild degenerative changes of the thoracic spine are present. There are no acute fractures, dislocation or acute paraspinous soft tissue abnormalities identified. LUMBAR SPINE CT FINDINGS: There is no fracture, dislocation, or acute paraspinous soft tissue abnormalities identified. Mild degenerative changes are noted.      FINAL IMPRESSION: NO ACUTE FRACTURES OR POSTTRAUMATIC COMPLICATION IDENTIFIED. BORDERLINE WALL THICKENING AND/OR FLUID AROUND AN OTHERWISE UNREMARKABLE. CHRONIC FINDINGS, AS NOTED. Ct Abdomen Pelvis W Iv Contrast Additional Contrast? None    Result Date: 1/19/2021  CT CHEST W CONTRAST, CT ABDOMEN PELVIS W IV CONTRAST, CT LUMBAR SPINE WO CONTRAST, CT THORACIC SPINE WO CONTRAST : 1/19/2021 CLINICAL HISTORY:  chest pain post fall . COMPARISON: CT abdomen pelvis 10/8/2017 and chest CT 12/16/2014. TECHNIQUE: Spiral images were obtained of the chest, abdomen and pelvis after the uneventful intravenous administration of approximately 100 mL of Isovue-370 contrast. Routine multiplanar reformatted reconstructions were obtained; including dedicated thoracic and lumbar spine reconstructions. All CT scans at this facility use dose modulation, iterative reconstruction, and/or weight based dosing when appropriate to reduce radiation dose to as low as reasonably achievable. CHEST CT FINDINGS: There are no displaced fractures, significant pulmonary contusion, evidence of great vessel injury, significant hematoma, pneumothorax, pleural or pericardial effusion. Mild subpleural interstitial lung disease of the mid to lower lung fields has mildly progressed from 12/16/2014. Mild mediastinal and hilar lymphadenopathy is probably reactive. ABDOMEN AND PELVIS CT FINDINGS: There is no evidence of solid organ injury, displaced fractures, or significant hematoma. Borderline wall thickening and/or fluid is noted around an otherwise unremarkable. The liver, spleen, pancreas, adrenal glands, kidneys, great vessels, unopacified bowel loops, urinary bladder, and additional images of pelvis are unremarkable. THORACIC SPINE CT FINDINGS: An old mild to moderate compression fractures of T11 and mild degenerative changes of the thoracic spine are present. There are no acute fractures, dislocation or acute paraspinous soft tissue abnormalities identified.  LUMBAR SPINE CT FINDINGS: There is no fracture, dislocation, or acute paraspinous soft tissue abnormalities identified. Mild degenerative changes are noted. FINAL IMPRESSION: NO ACUTE FRACTURES OR POSTTRAUMATIC COMPLICATION IDENTIFIED. BORDERLINE WALL THICKENING AND/OR FLUID AROUND AN OTHERWISE UNREMARKABLE. CHRONIC FINDINGS, AS NOTED. Discharge Medications:       Ozzie Huitron   Home Medication Instructions UOE:[id number]    Printed on:01/22/21 2986   Medication Information                      albuterol sulfate HFA (VENTOLIN HFA) 108 (90 Base) MCG/ACT inhaler  Inhale 2 puffs into the lungs every 6 hours as needed for Wheezing             amLODIPine (NORVASC) 10 MG tablet  TAKE 1 TABLET BY MOUTH DAILY             ARIPiprazole (ABILIFY) 5 MG tablet  TAKE 1 TABLET BY MOUTH DAILY             aspirin 81 MG tablet  Take 1 tablet by mouth daily             atorvastatin (LIPITOR) 40 MG tablet  Take 1 tablet by mouth daily             B Complex-C-Folic Acid (NEPHRO VITAMINS) 0.8 MG TABS  Take by mouth daily              Blood Glucose Monitoring Suppl (FREESTYLE LITE) CORETTA  1 Device by Does not apply route daily as needed (Diabetes) Use freestyle meter to test blood sugar as needed             blood glucose test strips (FREESTYLE LITE) strip  1 each by Does not apply route 4 times daily (before meals and nightly) As needed.              BRILINTA 90 MG TABS tablet  TAKE 1 TABLET BY MOUTH 2 TIMES DAILY             fluticasone (FLOVENT HFA) 110 MCG/ACT inhaler  Inhale 2 puffs into the lungs 2 times daily             insulin aspart (NOVOLOG FLEXPEN) 100 UNIT/ML injection pen  INJECT 6 units with each meals             ipratropium-albuterol (DUONEB) 0.5-2.5 (3) MG/3ML SOLN nebulizer solution  Inhale 3 mLs into the lungs every 4 hours as needed for Shortness of Breath             isosorbide mononitrate (IMDUR) 60 MG extended release tablet  Take 1 tablet by mouth daily             LANTUS SOLOSTAR 100 UNIT/ML injection pen  Inject 45 Units into the skin nightly             magnesium oxide (MAG-OX) 400 MG tablet  Take 400 mg by mouth daily             melatonin 3 MG TABS tablet  TAKE 1 TABLET BY MOUTH EVERY NIGHT AS NEEDED FOR INSOMNIA             metoprolol tartrate (LOPRESSOR) 25 MG tablet  Take 0.5 tablets by mouth 2 times daily             Misc. Devices (BARIATRIC ROLLATOR) MISC  Rolllator with a basket             Misc. Devices [facility]) MISC  To use with transport outside of the house. nitroGLYCERIN (NITROSTAT) 0.4 MG SL tablet  Place 1 tablet under the tongue every 5 minutes as needed for Chest pain             nystatin (NYAMYC) 651033 UNIT/GM powder  APPLY TO ABDOMINAL FOLDS EVERY 12 HOURS AS NEEDED             pantoprazole (PROTONIX) 20 MG tablet  TAKE 1 TABLET BY MOUTH DAILY             pregabalin (LYRICA) 75 MG capsule  TAKE ONE (1) CAPSULE BY MOUTH TWICE DAILY             ranolazine (RANEXA) 500 MG extended release tablet  Take 1 tablet by mouth 2 times daily             sertraline (ZOLOFT) 50 MG tablet  TAKE 1 TABLET BY MOUTH DAILY             SURE COMFORT PEN NEEDLES 30G X 8 MM MISC  USE AS DIRECTED FIVE TIMES A DAY             triamcinolone (KENALOG) 0.1 % cream  APPLY TO AFFECTED AREAS TWICE DAILY AS DIRECTED             vitamin B-12 (CYANOCOBALAMIN) 100 MCG tablet  Take 1 tablet by mouth daily                 Disposition:   Discharged to SNF. Any [facility] needs that were indicated and/or required as been addressed and set up by Social Work. Condition at discharge: Pt was medically stable at the time of discharge. Significant improvement in clinical condition compared to initial condition at presentation to hospital    Activity: activity as tolerated, fall precautions. Total time taken for discharging this patient: 40 minutes. Greater than 70% of time was spent focused exclusively on this patient.  Time was taken to review chart, discuss plans with consultants, reconciling medications, discussing plan answering questions with patient. Walter Montanez  1/22/2021, 1:13 PM  ----------------------------------------------------------------------------------------------------------------------    Modesta Jessica,     Please return to ER or call 911 if you develop any significant signs or symptoms.     I may not have addressed all of your medical illnesses or the abnormal blood work or imaging therefore please ask your PCP, Jocelyne Martinez MD ,  to obtain [facility] record to follow up on all of the abnormal labs, imaging and findings that I have and have not addressed during your hospitalization.      Discharging you from the hospital does not mean that your medical care ends here and now. You may still need additional work up, investigation, monitoring, and treatment to be handled from this point on by outside providers including your PCP, Jocelyne Martinez MD , Specialists and other healthcare providers.      Please review your list of discharge medications prior to resuming medications you might still have at home, as the medications you need to be taking, dosages or how often you must take them may have changed. For medication questions, contact your retail pharmacy and your PCP, Jocelyne Martinez MD .     ** I STRONGLY RECOMMEND that you follow up with Jocelyne Martienz MD within 3 to 5 days for a post hospitalization evaluation. This specific office visit is covered by your insurance, and is not the same as your annual doctor visit/ check up. This office visit is important, as it may prevent need for repeat and/or future hospitalizations. **    Your medical team at [facility] ([facility]) appreciates the opportunity to work with you to get well!     Sincerely,  Mali Kaur

## 2021-01-22 NOTE — PROGRESS NOTES
Physical Therapy Missed Treatment   Facility/Department: [facility] MED SURG A234/[id number]    NAME: Vickie Oconnor    : 1960 (56 y.o.)  MRN: [id number]    Account: [de-identified]  Gender: female        [x] Patient Declines PT Treatment:  Setting up D/C plans, declined at this time           [] Patient Unavailable: Will attempt PT treatment again at earliest convenience.         Electronically signed by Queenie Briscoe PTA on 21 at 12:09 PM EST

## 2021-01-22 NOTE — PROGRESS NOTES
Nephrology Progress Note    Assessment:  ESRDX  Prior Covid-19 positive  Hx CVA  Hx Viral hepatitis-C  Hx Schizohrenia  DM type-2  Hypertension      Plan:   - continue IHD while inpatient otherwise pt chair time at [facility] is TTS at 12:45 pm per chart review     Patient Active Problem List:     Coronary artery disease involving native heart with angina pectoris (HCC)     Schizophrenia, paranoid, chronic     Metabolic syndrome     Vitamin B 12 deficiency     Cerebral microvascular disease     Mixed hyperlipidemia     Other hammer toe (acquired)     Vitamin D insufficiency     Incontinence of urine     Diabetic nephropathy with proteinuria ([address]     Essential hypertension     History of type C viral hepatitis     Urinary incontinence due to cognitive impairment     History of seizures     Stented coronary artery-plan is to stay on Plavix indefinately per Dr Naseem Paul     Hemiparesis, left ([address]     Angina, class II ([address]     Chronic painful diabetic neuropathy ([address]     Tardive dyskinesia     Shortness of breath     Uncontrolled type 2 diabetes mellitus with hyperglycemia (HCC)     Diastolic congestive heart failure (HCC)     Sleep apnea     Pulmonary hypertension (HCC)     Class 2 severe obesity with serious comorbidity and body mass index (BMI) of 36.0 to 36.9 in adult [facility])     Edema     Closed supracondylar fracture of right humerus     Other chronic pain     Palliative care patient     Chest pain     Recurrent falls     Renal failure     Difficulty in walking     ESRD (end stage renal disease) on dialysis ([address]     Weakness     Moderate persistent asthma without complication     Thrush     COVID-19     Post PTCA     Falls frequently      Subjective:  Admit Date: 1/19/2021    Interval History: no acute overnight events, remains on room, [facility] confirmed chair time for today, bleeding from SQ hep last night     Medications:  Scheduled Meds:   magnesium oxide  400 mg Oral Daily    aspirin  81 mg Oral Daily  atorvastatin  40 mg Oral Daily    amLODIPine  10 mg Oral Daily    isosorbide mononitrate  60 mg Oral Daily    sertraline  50 mg Oral Daily    ARIPiprazole  5 mg Oral Daily    pantoprazole  20 mg Oral Daily    ranolazine  500 mg Oral BID    insulin glargine  45 Units Subcutaneous Nightly    metoprolol tartrate  12.5 mg Oral BID    pregabalin  75 mg Oral Daily    ticagrelor  90 mg Oral BID    fluticasone  1 puff Inhalation BID    miconazole   Topical BID    insulin lispro  0.08 Units/kg Subcutaneous TID WC    insulin lispro  0-6 Units Subcutaneous TID WC    insulin lispro  0-3 Units Subcutaneous Nightly    pill splitter   Does not apply Once    cholestyramine light  4 g Oral BID    zinc sulfate  50 mg Oral Daily    ascorbic acid  500 mg Oral BID    [Held by provider] heparin (porcine)  5,000 Units Subcutaneous 3 times per day     Continuous Infusions:   dextrose      sodium chloride         CBC:   Recent Labs     01/21/21  0639 01/22/21  0638   WBC 7.9 6.3   HGB 8.4* 8.1*    152     CMP:    Recent Labs     01/20/21  0704 01/21/21  0639 01/22/21  0638   * 130* 129*   K 4.0 3.9 3.9   CL 91* 88* 90*   CO2 27 25 26   BUN 29* 35* 21   CREATININE 4.02* 4.88* 3.88*   GLUCOSE 268* 159* 186*   CALCIUM 9.6 9.5 8.9   LABGLOM 11.2* 9.0* 11.7*     Troponin: No results for input(s): TROPONINI in the last 72 hours. BNP: No results for input(s): BNP in the last 72 hours. INR:   Recent Labs     01/19/21  1630   INR 1.1     Lipids: No results for input(s): CHOL, LDLDIRECT, TRIG, HDL, AMYLASE, LIPASE in the last 72 hours. Liver:   Recent Labs     01/19/21  1630   AST 34   ALT 16   ALKPHOS 169*   PROT 7.4   LABALBU 4.0   BILITOT 0.6     Iron:    Recent Labs     01/22/21  0638   FERRITIN 795.1*     Urinalysis: No results for input(s): UA in the last 72 hours.     Objective:  Vitals: BP (!) 104/47   Pulse 65   Temp 97.9 °F (36.6 °C) (Oral)   Resp 16   Ht 5' 7\" (1.702 m)   Wt 213 lb (96.6 kg) LMP  (LMP Unknown)   SpO2 100%   BMI 33.36 kg/m²    Wt Readings from Last 3 Encounters:   01/19/21 213 lb (96.6 kg)   01/12/21 207 lb (93.9 kg)   01/03/21 207 lb (93.9 kg)      24HR INTAKE/OUTPUT:  No intake or output data in the 24 hours ending 01/22/21 1029    General: alert, in no apparent distress  HEENT: normocephalic, atraumatic, anicteric  Lungs: non-labored respirations, clear to auscultation bilaterally  Heart: regular rate and rhythm, 1/67 systol;ic murmurs or rubs  Abdomen: soft, non-tender, non-distended  Ext: no cyanosis, no peripheral edema, LUE AVF  Neuro: alert and oriented, no gross abnormalities        Electronically signed by Bouchra Garíca MD

## 2021-01-24 NOTE — PROGRESS NOTES
Physician Progress Note      PATIENT:               Surjit Barraza  CSN #:                  [id number]  :                       1960  ADMIT DATE:       2021 3:13 PM  [address] [place] DATE:        2021 4:33 PM  RESPONDING  PROVIDER #:        Marques RILEY DO          QUERY TEXT:    Pt with h/o CAD, admitted with chest pain and dyspnea. Angina documented in   H&P. \"Chest pain appears to be noncardiac\" noted by Dr. Di Grove on 1/4-15. Troponin 0.018/<0.010. Pt remains COVID-19 positive . If possible, please   document the etiology of chest pain[de-identified]    The medical record reflects the following:  Risk Factors: CAD s/p DEWEY, COVID-19, DM2, ESRD, GERD, HTN, HLD, HF  Clinical Indicators: troponin 0.018/<0.010, EKG-SR with 1st degree AVB, rate   of 78 bpm, no ST elevations, QT of 450  CXR showed no acute abnormalities  Treatment: Lopressor, Plavix, ASA, statin, Imdur, Ranexa, Brilinta, cardiology   consult, EKG, troponin, droplet plus isolation    Thank you, Alexander Edwards RN BSN CDS  [phone]  Options provided:  -- Chest pain due to CAD with unstable angina  -- Chest pain due to COVID-19  -- Chest pain due to GERD  -- Chest pain due to other, Please document other cause. -- Other - I will add my own diagnosis  -- Disagree - Not applicable / Not valid  -- Disagree - Clinically unable to determine / Unknown  -- Refer to Clinical Documentation Reviewer    PROVIDER RESPONSE TEXT:    This patient has chest pain due to COVID-19.     Query created by: Hakn Mcneal on 2021 2:29 PM      Electronically signed by:  Carlos Eduardo Viveros DO 2021 8:28 AM

## 2021-01-25 ENCOUNTER — OFFICE VISIT (OUTPATIENT)
Dept: GERIATRIC MEDICINE | Age: 61
End: 2021-01-25
Payer: MEDICARE

## 2021-01-25 DIAGNOSIS — R26.81 UNSTEADINESS: ICD-10-CM

## 2021-01-25 DIAGNOSIS — U07.1 COVID-19: Primary | ICD-10-CM

## 2021-01-25 DIAGNOSIS — E13.40 OTHER SPECIFIED DIABETES MELLITUS WITH DIABETIC NEUROPATHY, UNSPECIFIED (HCC): ICD-10-CM

## 2021-01-25 DIAGNOSIS — N18.6 END STAGE RENAL DISEASE (HCC): ICD-10-CM

## 2021-01-25 PROCEDURE — 3051F HG A1C>EQUAL 7.0%<8.0%: CPT | Performed by: INTERNAL MEDICINE

## 2021-01-25 PROCEDURE — 99305 1ST NF CARE MODERATE MDM 35: CPT | Performed by: INTERNAL MEDICINE

## 2021-01-25 PROCEDURE — G8484 FLU IMMUNIZE NO ADMIN: HCPCS | Performed by: INTERNAL MEDICINE

## 2021-01-26 ENCOUNTER — CARE COORDINATION (OUTPATIENT)
Dept: CASE MANAGEMENT | Age: 61
End: 2021-01-26

## 2021-01-26 RX ORDER — BROMFENAC SODIUM 0.7 MG/ML
1 SOLUTION/ DROPS OPHTHALMIC DAILY
COMMUNITY
Start: 2021-01-24 | End: 2021-01-26

## 2021-01-28 ENCOUNTER — OFFICE VISIT (OUTPATIENT)
Dept: GERIATRIC MEDICINE | Age: 61
End: 2021-01-28
Payer: MEDICARE

## 2021-01-28 DIAGNOSIS — E88.81 METABOLIC SYNDROME: ICD-10-CM

## 2021-01-28 DIAGNOSIS — F20.0 SCHIZOPHRENIA, PARANOID, CHRONIC (HCC): ICD-10-CM

## 2021-01-28 DIAGNOSIS — Z79.4 CONTROLLED TYPE 2 DIABETES MELLITUS WITH DIABETIC NEUROPATHY, WITH LONG-TERM CURRENT USE OF INSULIN (HCC): Primary | ICD-10-CM

## 2021-01-28 DIAGNOSIS — E11.40 CONTROLLED TYPE 2 DIABETES MELLITUS WITH DIABETIC NEUROPATHY, WITH LONG-TERM CURRENT USE OF INSULIN (HCC): Primary | ICD-10-CM

## 2021-01-28 PROCEDURE — 3051F HG A1C>EQUAL 7.0%<8.0%: CPT | Performed by: INTERNAL MEDICINE

## 2021-01-28 PROCEDURE — 99309 SBSQ NF CARE MODERATE MDM 30: CPT | Performed by: INTERNAL MEDICINE

## 2021-01-28 PROCEDURE — G8484 FLU IMMUNIZE NO ADMIN: HCPCS | Performed by: INTERNAL MEDICINE

## 2021-01-29 ENCOUNTER — OFFICE VISIT (OUTPATIENT)
Dept: GERIATRIC MEDICINE | Age: 61
End: 2021-01-29
Payer: MEDICARE

## 2021-01-29 DIAGNOSIS — G47.00 INSOMNIA, UNSPECIFIED TYPE: Primary | ICD-10-CM

## 2021-01-29 PROCEDURE — 99309 SBSQ NF CARE MODERATE MDM 30: CPT | Performed by: NURSE PRACTITIONER

## 2021-01-29 PROCEDURE — G8484 FLU IMMUNIZE NO ADMIN: HCPCS | Performed by: NURSE PRACTITIONER

## 2021-02-02 ENCOUNTER — OFFICE VISIT (OUTPATIENT)
Dept: GERIATRIC MEDICINE | Age: 61
End: 2021-02-02
Payer: MEDICARE

## 2021-02-02 ENCOUNTER — TELEPHONE (OUTPATIENT)
Dept: FAMILY MEDICINE CLINIC | Age: 61
End: 2021-02-02

## 2021-02-02 DIAGNOSIS — G81.94 HEMIPARESIS, LEFT (HCC): ICD-10-CM

## 2021-02-02 DIAGNOSIS — F20.0 SCHIZOPHRENIA, PARANOID, CHRONIC (HCC): ICD-10-CM

## 2021-02-02 DIAGNOSIS — R53.1 WEAKNESS: Primary | ICD-10-CM

## 2021-02-02 DIAGNOSIS — G62.81 CRITICAL ILLNESS POLYNEUROPATHY (HCC): ICD-10-CM

## 2021-02-02 PROCEDURE — 99356 PR PROLONGED SVC I/P OR OBS SETTING 1ST HOUR: CPT | Performed by: NURSE PRACTITIONER

## 2021-02-02 PROCEDURE — 99310 SBSQ NF CARE HIGH MDM 45: CPT | Performed by: NURSE PRACTITIONER

## 2021-02-02 PROCEDURE — G8484 FLU IMMUNIZE NO ADMIN: HCPCS | Performed by: NURSE PRACTITIONER

## 2021-02-02 NOTE — TELEPHONE ENCOUNTER
Pharmacy called in and said they cannot fill the rx for the rollator that it needs sent to a local pharmacy.

## 2021-02-09 ENCOUNTER — OFFICE VISIT (OUTPATIENT)
Dept: GERIATRIC MEDICINE | Age: 61
End: 2021-02-09
Payer: MEDICARE

## 2021-02-09 DIAGNOSIS — E11.40 CONTROLLED TYPE 2 DIABETES MELLITUS WITH DIABETIC NEUROPATHY, WITH LONG-TERM CURRENT USE OF INSULIN (HCC): ICD-10-CM

## 2021-02-09 DIAGNOSIS — I25.119 ATHEROSCLEROSIS OF NATIVE CORONARY ARTERY OF NATIVE HEART WITH ANGINA PECTORIS (HCC): ICD-10-CM

## 2021-02-09 DIAGNOSIS — Z79.4 CONTROLLED TYPE 2 DIABETES MELLITUS WITH DIABETIC NEUROPATHY, WITH LONG-TERM CURRENT USE OF INSULIN (HCC): ICD-10-CM

## 2021-02-09 DIAGNOSIS — F32.A DEPRESSION, UNSPECIFIED DEPRESSION TYPE: ICD-10-CM

## 2021-02-09 DIAGNOSIS — I50.31 ACUTE DIASTOLIC (CONGESTIVE) HEART FAILURE (HCC): ICD-10-CM

## 2021-02-09 DIAGNOSIS — N18.6 END STAGE RENAL DISEASE (HCC): ICD-10-CM

## 2021-02-09 DIAGNOSIS — J44.9 CHRONIC OBSTRUCTIVE PULMONARY DISEASE, UNSPECIFIED COPD TYPE (HCC): ICD-10-CM

## 2021-02-09 DIAGNOSIS — I10 ESSENTIAL (PRIMARY) HYPERTENSION: ICD-10-CM

## 2021-02-09 DIAGNOSIS — R53.1 WEAKNESS: Primary | ICD-10-CM

## 2021-02-09 PROCEDURE — G8484 FLU IMMUNIZE NO ADMIN: HCPCS | Performed by: NURSE PRACTITIONER

## 2021-02-09 PROCEDURE — 3051F HG A1C>EQUAL 7.0%<8.0%: CPT | Performed by: NURSE PRACTITIONER

## 2021-02-09 PROCEDURE — 99316 NF DSCHRG MGMT 30 MIN+: CPT | Performed by: NURSE PRACTITIONER

## 2021-02-10 ENCOUNTER — VIRTUAL VISIT (OUTPATIENT)
Dept: CARDIOLOGY CLINIC | Age: 61
End: 2021-02-10
Payer: MEDICARE

## 2021-02-10 DIAGNOSIS — R06.02 SHORTNESS OF BREATH: ICD-10-CM

## 2021-02-10 DIAGNOSIS — I10 ESSENTIAL HYPERTENSION: ICD-10-CM

## 2021-02-10 DIAGNOSIS — I50.30 DIASTOLIC CONGESTIVE HEART FAILURE, UNSPECIFIED HF CHRONICITY (HCC): ICD-10-CM

## 2021-02-10 DIAGNOSIS — Z95.5 STENTED CORONARY ARTERY: ICD-10-CM

## 2021-02-10 DIAGNOSIS — I25.119 CORONARY ARTERY DISEASE INVOLVING NATIVE CORONARY ARTERY OF NATIVE HEART WITH ANGINA PECTORIS (HCC): Primary | ICD-10-CM

## 2021-02-10 DIAGNOSIS — I25.119 CORONARY ARTERY DISEASE INVOLVING NATIVE HEART WITH ANGINA PECTORIS, UNSPECIFIED VESSEL OR LESION TYPE (HCC): ICD-10-CM

## 2021-02-10 DIAGNOSIS — E78.2 MIXED HYPERLIPIDEMIA: ICD-10-CM

## 2021-02-10 PROCEDURE — 3017F COLORECTAL CA SCREEN DOC REV: CPT | Performed by: INTERNAL MEDICINE

## 2021-02-10 PROCEDURE — 99214 OFFICE O/P EST MOD 30 MIN: CPT | Performed by: INTERNAL MEDICINE

## 2021-02-10 PROCEDURE — 1111F DSCHRG MED/CURRENT MED MERGE: CPT | Performed by: INTERNAL MEDICINE

## 2021-02-10 PROCEDURE — G8428 CUR MEDS NOT DOCUMENT: HCPCS | Performed by: INTERNAL MEDICINE

## 2021-02-10 ASSESSMENT — ENCOUNTER SYMPTOMS
BLOOD IN STOOL: 0
RESPIRATORY NEGATIVE: 1
COUGH: 0
WHEEZING: 0
EYES NEGATIVE: 1
STRIDOR: 0
NAUSEA: 0
SHORTNESS OF BREATH: 0
CHEST TIGHTNESS: 0
GASTROINTESTINAL NEGATIVE: 1

## 2021-02-10 NOTE — PROGRESS NOTES
Subsequent Progress Note  Patient: Linda Ennis  YOB: 1960  MRN: [id number]    Chief Complaint:  Chief Complaint   Patient presents with   Corlis Liter     Dr. Lopez Route pt   CV Data:  6/2020 Echo EF 60  Mild mR  RVSP 46   7/2020 LAD DEWEY. She has prior stents as well. Subjective/HPI: TELEHEALTH EVALUATION -- Audio/Visual (During DUVGI-56 public health emergency)    Pt is at a nursing home now. No cp occ SOB no falls no bleed. Takes meds. Recently Plavix stopped and Brilinta added. Had recerrnet admissions for CP    10/28/2020 started HD( T Thur and Sat). Past 3 days gaine ~ 10 LBS according to her scale at home and HD. She is not short of breath and denies CP. She is here due to discrepancy in weight. 2/10/21 TELEHEALTH EVALUATION -- Audio/Visual (During BUYGI-66 public health emergency)    At [facility]. No further falls no cp some sob eats well takes meds. Will go home net week.  Doing well w HD    EKG:    Past Medical History:   Diagnosis Date    Anxiety     CAD S/P percutaneous coronary angioplasty 2015, 2018    stents per dr Shanon Rodriguez CHF (congestive heart failure) ([address]     CKD (chronic kidney disease) stage 4, GFR 15-29 ml/min ([address] 2/24/2018    CKD stage 4 due to type 2 diabetes mellitus ([address]     COPD (chronic obstructive pulmonary disease) ([address]     Diabetic nephropathy with proteinuria ([address] 2014    DJD (degenerative joint disease) of knee     Dr Néstor Biggs GERD (gastroesophageal reflux disease)     Hemiparesis, left ([address] 2013    entered Assisted Living ([facility])    Hemodialysis patient [facility])     History of heart failure     History of seizures     History of type C viral hepatitis     HTN (hypertension)     Hyperlipidemia     Impaired mobility and activities of daily living     Mediastinal lymphadenopathy 2013    Cm Moore    Metabolic syndrome     Moderate persistent asthma without complication 2020    Neurogenic urinary incontinence 2013    Neuropathy in diabetes (HCC)     Obesity (BMI 30-39. 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 Raúl Chacon      x1 Dr. Eleazar Manuel, Dr Becky Posada CATH LAB PROCEDURE  10/02/2019    HYSTERECTOMY, TOTAL ABDOMINAL      one ovary intact, Dr Julienne Cooper, menorrhagia    UT TOTAL KNEE ARTHROPLASTY Left 2018    LEFT KNEE TOTAL KNEE ARTHROPLASTY, SHAYNA, NERVE BLOCK performed by Juan Murdock MD at [address] [place] [address] Right     TOTAL KNEE ARTHROPLASTY  16    Dr Becca Marshall [address]UN[address]ELED [address] Right 2020    tunneled HD catheter per Dr Ashley Garduno       Family History   Problem Relation Age of Onset    Cancer Mother 76        survived   Floyce Reyes Hypertension Father     Diabetes Sister     Mental Illness Sister        Social History     Socioeconomic History    Marital status:      Spouse name: Not on file    Number of children: 2    Years of education: Not on file    Highest education level: Not on file   Occupational History    Occupation: disabled   Social Needs    Financial resource strain: Not on file    Food insecurity     Worry: Not on file     Inability: Not on file   Kadmon needs     Medical: Not on file Non-medical: Not on file   Tobacco Use    Smoking status: Passive Smoke Exposure - Never Smoker    Smokeless tobacco: Never Used   Substance and Sexual Activity    Alcohol use: No     Alcohol/week: 0.0 standard drinks    Drug use: No    Sexual activity: Not Currently   Lifestyle    Physical activity     Days per week: Not on file     Minutes per session: Not on file    Stress: Not on file   Relationships    Social connections     Talks on phone: Not on file     Gets together: Not on file     Attends Muslim service: Not on file     Active member of club or organization: Not on file     Attends meetings of clubs or organizations: Not on file     Relationship status: Not on file    Intimate partner violence     Fear of current or ex partner: Not on file     Emotionally abused: Not on file     Physically abused: Not on file     Forced sexual activity: Not on file   Other Topics Concern    Not on file   Social History Narrative    Born in [place], one of 5    Twin sister Reyes, very ill in 2018, [place] [address]    Moved to [facility], , 2 children, one son and one daughter    Worked at [employer], as a nurse's aide    Disabled due to mental illness    Lived at [facility], was discharged, returned to independent living in 2017 in the daughter's house and has adjusted well    One son and one daughter, live in the same house with patient, Rebekah Reed pays the rent    Hobbies reading (misteries)       Allergies   Allergen Reactions    Codeine Hives     hives    Oxycontin [Oxycodone Hcl] Hives       Current Outpatient Medications   Medication Sig Dispense Refill    pregabalin (LYRICA) 75 MG capsule TAKE ONE (1) CAPSULE BY MOUTH TWICE DAILY 60 capsule 2    triamcinolone (KENALOG) 0.1 % cream APPLY TO AFFECTED AREAS TWICE DAILY AS DIRECTED 80 g 10    nystatin (NYAMYC) 283725 UNIT/GM powder APPLY TO ABDOMINAL FOLDS EVERY 12 HOURS AS NEEDED 60 g 10    blood glucose test strips (FREESTYLE LITE) strip 1 each by Does not apply route 4 times daily (before meals and nightly) As needed. 200 strip 5    vitamin B-12 (CYANOCOBALAMIN) 100 MCG tablet Take 1 tablet by mouth daily (Patient taking differently: Take 100 mcg by mouth daily At night) 30 tablet 10    insulin aspart (NOVOLOG FLEXPEN) 100 UNIT/ML injection pen INJECT 6 units with each meals 15 mL 10    LANTUS SOLOSTAR 100 UNIT/ML injection pen Inject 45 Units into the skin nightly 15 mL 10    metoprolol tartrate (LOPRESSOR) 25 MG tablet Take 0.5 tablets by mouth 2 times daily 30 tablet 1    Misc. Devices (BARIATRIC ROLLATOR) MISC Rolllator with a basket 1 each 0    Blood Glucose Monitoring Suppl (FREESTYLE LITE) CORETTA 1 Device by Does not apply route daily as needed (Diabetes) Use freestyle meter to test blood sugar as needed 1 Device 0    ranolazine (RANEXA) 500 MG extended release tablet Take 1 tablet by mouth 2 times daily 180 tablet 2    ARIPiprazole (ABILIFY) 5 MG tablet TAKE 1 TABLET BY MOUTH DAILY 30 tablet 2    BRILINTA 90 MG TABS tablet TAKE 1 TABLET BY MOUTH 2 TIMES DAILY 60 tablet 11    pantoprazole (PROTONIX) 20 MG tablet TAKE 1 TABLET BY MOUTH DAILY 90 tablet 3    B Complex-C-Folic Acid (NEPHRO VITAMINS) 0.8 MG TABS Take by mouth daily       albuterol sulfate HFA (VENTOLIN HFA) 108 (90 Base) MCG/ACT inhaler Inhale 2 puffs into the lungs every 6 hours as needed for Wheezing      sertraline (ZOLOFT) 50 MG tablet TAKE 1 TABLET BY MOUTH DAILY 90 tablet 3    fluticasone (FLOVENT HFA) 110 MCG/ACT inhaler Inhale 2 puffs into the lungs 2 times daily (Patient taking differently: Inhale 2 puffs into the lungs 2 times daily as needed ) 1 Inhaler 12    Misc. Devices [facility]) MIS To use with transport outside of the house.  1 each 0    isosorbide mononitrate (IMDUR) 60 MG extended release tablet Take 1 tablet by mouth daily 90 tablet 3    amLODIPine (NORVASC) 10 MG tablet TAKE 1 TABLET BY MOUTH DAILY 90 tablet 1    aspirin 81 MG tablet Take 1 tablet by mouth daily 90 tablet 3    atorvastatin (LIPITOR) 40 MG tablet Take 1 tablet by mouth daily 90 tablet 3    SURE COMFORT PEN NEEDLES 30G X 8 MM MISC USE AS DIRECTED FIVE TIMES A  each 10    melatonin 3 MG TABS tablet TAKE 1 TABLET BY MOUTH EVERY NIGHT AS NEEDED FOR INSOMNIA 180 tablet 4    nitroGLYCERIN (NITROSTAT) 0.4 MG SL tablet Place 1 tablet under the tongue every 5 minutes as needed for Chest pain      magnesium oxide (MAG-OX) 400 MG tablet Take 400 mg by mouth daily       No current facility-administered medications for this visit. Review of Systems:   Review of Systems   Constitutional: Negative. Negative for diaphoresis and fatigue. HENT: Negative. Eyes: Negative. Respiratory: Negative. Negative for cough, chest tightness, shortness of breath, wheezing and stridor. Cardiovascular: Negative. Negative for chest pain, palpitations and leg swelling. Gastrointestinal: Negative. Negative for blood in stool and nausea. Genitourinary: Negative. Musculoskeletal: Negative. Skin: Negative. Neurological: Negative. Negative for dizziness, syncope, weakness and light-headedness. Hematological: Negative. Psychiatric/Behavioral: Negative.           Physical Examination:    LMP  (LMP Unknown)    Physical Exam    LABS:  CBC:   Lab Results   Component Value Date    WBC 6.3 01/22/2021    RBC 2.63 01/22/2021    HGB 8.1 01/22/2021    HCT 23.5 01/22/2021    MCV 89.4 01/22/2021    MCH 30.9 01/22/2021    MCHC 34.5 01/22/2021    RDW 15.5 01/22/2021     01/22/2021    MPV 10.0 03/05/2020     Lipids:  Lab Results   Component Value Date    CHOL 101 06/11/2020    CHOL 107 05/25/2020    CHOL 137 02/26/2019     Lab Results   Component Value Date    TRIG 82 06/11/2020    TRIG 85 05/25/2020    TRIG 116 02/26/2019     Lab Results   Component Value Date    HDL 48 06/11/2020    HDL 44 05/25/2020    HDL 55 02/26/2019     Lab Results   Component Value Date    LDLCALC 37 06/11/2020    LDLCALC 46 05/25/2020    LDLCALC 59 02/26/2019     Lab Results   Component Value Date    LABVLDL 59.0 05/04/2013    VLDL 43 01/30/2017     Lab Results   Component Value Date    CHOLHDLRATIO 4.32 01/30/2017     CMP:    Lab Results   Component Value Date     01/22/2021    K 3.9 01/22/2021    CL 90 01/22/2021    CO2 26 01/22/2021    BUN 21 01/22/2021    CREATININE 3.88 01/22/2021    GFRAA 14.2 01/22/2021    LABGLOM 11.7 01/22/2021    GLUCOSE 186 01/22/2021    GLUCOSE 102 10/16/2020    PROT 7.4 01/19/2021    LABALBU 4.0 01/19/2021    CALCIUM 8.9 01/22/2021    BILITOT 0.6 01/19/2021    ALKPHOS 169 01/19/2021    AST 34 01/19/2021    ALT 16 01/19/2021     BMP:    Lab Results   Component Value Date     01/22/2021    K 3.9 01/22/2021    CL 90 01/22/2021    CO2 26 01/22/2021    BUN 21 01/22/2021    LABALBU 4.0 01/19/2021    CREATININE 3.88 01/22/2021    CALCIUM 8.9 01/22/2021    GFRAA 14.2 01/22/2021    LABGLOM 11.7 01/22/2021    GLUCOSE 186 01/22/2021    GLUCOSE 102 10/16/2020     Magnesium:    Lab Results   Component Value Date    MG 2.1 01/22/2021     TSH:  Lab Results   Component Value Date    TSH 0.454 06/28/2020       Patient Active Problem List   Diagnosis    Coronary artery disease involving native heart with angina pectoris (HCC)    Schizophrenia, paranoid, chronic    Metabolic syndrome    Vitamin B 12 deficiency    Cerebral microvascular disease    Mixed hyperlipidemia    Other hammer toe (acquired)    Vitamin D insufficiency    Incontinence of urine    Diabetic nephropathy with proteinuria ([facility]    Essential hypertension    History of type C viral hepatitis    Urinary incontinence due to cognitive impairment    History of seizures    Stented coronary artery-plan is to stay on Plavix indefinately per Dr Mervat Bryant    Hemiparesis, left ([facility]    Angina, class II ([address]    Chronic painful diabetic neuropathy ([facility]    Tardive dyskinesia    Shortness of breath    Uncontrolled type 2 diabetes mellitus with hyperglycemia (HCC)    Diastolic congestive heart failure (HCC)    Sleep apnea    Pulmonary hypertension (HCC)    Class 2 severe obesity with serious comorbidity and body mass index (BMI) of 36.0 to 36.9 in adult [facility])    Edema    Closed supracondylar fracture of right humerus    Other chronic pain    Palliative care patient    Chest pain    Recurrent falls    Renal failure    Difficulty in walking    ESRD (end stage renal disease) on dialysis (HCC)    Weakness    Moderate persistent asthma without complication    Thrush    COVID-19    Post PTCA    Falls frequently       There are no discontinued medications. Modified Medications    No medications on file       No orders of the defined types were placed in this encounter. Assessment/Plan:    1. Coronary artery disease involving native heart with angina pectoris, unspecified vessel or lesion type ([facility]  Stable. Refills given    2. Diastolic congestive heart failure, unspecified HF chronicity (HCC)  Stable. No edema. 3. Stented coronary artery    4. Shortness of breath   stable - no worse    5. Mixed hyperlipidemia  Statin     6. Essential hypertension       7. Coronary artery disease involving native coronary artery of native heart with angina pectoris (HCC)         Counseling:  Heart Healthy Lifestyle, Low Salt Diet, Take Precautions to Prevent Falls and Walk Daily    Return in about 1 month (around 3/10/2021). Pursuant to the emergency declaration under the [facility], [facility] waiver authority and the [employer] Act, this Virtual Visit was conducted, with patient's consent, to reduce the patient's risk of exposure to COVID-19 and provide continuity of care for an established patient. Services were provided through a video synchronous discussion virtually to substitute for in-person clinic visit.   Visit completed using doxy. me. Patient was located at home and I was located in the clinic.   Electronically signed by Pilar Landeros MD on 2/10/2021 at 4:01 PM

## 2021-02-16 ENCOUNTER — APPOINTMENT (OUTPATIENT)
Dept: CT IMAGING | Age: 61
DRG: 542 | End: 2021-02-16
Payer: MEDICARE

## 2021-02-16 ENCOUNTER — HOSPITAL ENCOUNTER (INPATIENT)
Age: 61
LOS: 6 days | Discharge: NURSING, REHAB OR LONG-TERM CARE | DRG: 542 | End: 2021-02-24
Attending: SURGERY | Admitting: SURGERY
Payer: MEDICARE

## 2021-02-16 ENCOUNTER — TELEPHONE (OUTPATIENT)
Dept: OTHER | Facility: CLINIC | Age: 61
End: 2021-02-16

## 2021-02-16 ENCOUNTER — OFFICE VISIT (OUTPATIENT)
Dept: GERIATRIC MEDICINE | Age: 61
End: 2021-02-16
Payer: MEDICARE

## 2021-02-16 DIAGNOSIS — E11.40 CONTROLLED TYPE 2 DIABETES MELLITUS WITH DIABETIC NEUROPATHY, WITH LONG-TERM CURRENT USE OF INSULIN (HCC): ICD-10-CM

## 2021-02-16 DIAGNOSIS — S22.41XA CLOSED FRACTURE OF MULTIPLE RIBS OF RIGHT SIDE, INITIAL ENCOUNTER: ICD-10-CM

## 2021-02-16 DIAGNOSIS — R10.11 RIGHT UPPER QUADRANT ABDOMINAL PAIN: ICD-10-CM

## 2021-02-16 DIAGNOSIS — R29.6 FALLS FREQUENTLY: ICD-10-CM

## 2021-02-16 DIAGNOSIS — U07.1 COVID-19: ICD-10-CM

## 2021-02-16 DIAGNOSIS — Z99.2 STAGE 5 CHRONIC KIDNEY DISEASE ON CHRONIC DIALYSIS (HCC): ICD-10-CM

## 2021-02-16 DIAGNOSIS — R53.1 WEAKNESS: Primary | ICD-10-CM

## 2021-02-16 DIAGNOSIS — S20.212A CHEST WALL CONTUSION, LEFT, INITIAL ENCOUNTER: ICD-10-CM

## 2021-02-16 DIAGNOSIS — N18.6 STAGE 5 CHRONIC KIDNEY DISEASE ON CHRONIC DIALYSIS (HCC): ICD-10-CM

## 2021-02-16 DIAGNOSIS — S20.211A CONTUSION OF RIGHT CHEST WALL, INITIAL ENCOUNTER: Primary | ICD-10-CM

## 2021-02-16 DIAGNOSIS — Z79.4 CONTROLLED TYPE 2 DIABETES MELLITUS WITH DIABETIC NEUROPATHY, WITH LONG-TERM CURRENT USE OF INSULIN (HCC): ICD-10-CM

## 2021-02-16 PROBLEM — S20.219A CHEST WALL CONTUSION, UNSPECIFIED LATERALITY, INITIAL ENCOUNTER: Status: ACTIVE | Noted: 2021-02-16

## 2021-02-16 LAB
ALBUMIN SERPL-MCNC: 4.6 G/DL (ref 3.5–4.6)
ALP BLD-CCNC: 127 U/L (ref 40–130)
ALT SERPL-CCNC: 16 U/L (ref 0–33)
ANION GAP SERPL CALCULATED.3IONS-SCNC: 14 MEQ/L (ref 9–15)
APTT: 34.1 SEC (ref 24.4–36.8)
AST SERPL-CCNC: 23 U/L (ref 0–35)
BASOPHILS ABSOLUTE: 0 K/UL (ref 0–0.2)
BASOPHILS RELATIVE PERCENT: 0.4 %
BILIRUB SERPL-MCNC: 1 MG/DL (ref 0.2–0.7)
BUN BLDV-MCNC: 22 MG/DL (ref 8–23)
CALCIUM SERPL-MCNC: 10.1 MG/DL (ref 8.5–9.9)
CHLORIDE BLD-SCNC: 92 MEQ/L (ref 95–107)
CO2: 31 MEQ/L (ref 20–31)
CREAT SERPL-MCNC: 2.52 MG/DL (ref 0.5–0.9)
EKG ATRIAL RATE: 81 BPM
EKG P AXIS: 76 DEGREES
EKG P-R INTERVAL: 298 MS
EKG Q-T INTERVAL: 416 MS
EKG QRS DURATION: 136 MS
EKG QTC CALCULATION (BAZETT): 483 MS
EKG R AXIS: -50 DEGREES
EKG T AXIS: 77 DEGREES
EKG VENTRICULAR RATE: 81 BPM
EOSINOPHILS ABSOLUTE: 0.1 K/UL (ref 0–0.7)
EOSINOPHILS RELATIVE PERCENT: 1 %
GFR AFRICAN AMERICAN: 23.3
GFR NON-AFRICAN AMERICAN: 19.3
GLOBULIN: 3.8 G/DL (ref 2.3–3.5)
GLUCOSE BLD-MCNC: 164 MG/DL (ref 70–99)
HCT VFR BLD CALC: 29.7 % (ref 37–47)
HEMOGLOBIN: 10.2 G/DL (ref 12–16)
INR BLD: 1.2
LYMPHOCYTES ABSOLUTE: 0.6 K/UL (ref 1–4.8)
LYMPHOCYTES RELATIVE PERCENT: 10.1 %
MCH RBC QN AUTO: 31.1 PG (ref 27–31.3)
MCHC RBC AUTO-ENTMCNC: 34.4 % (ref 33–37)
MCV RBC AUTO: 90.6 FL (ref 82–100)
MONOCYTES ABSOLUTE: 0.5 K/UL (ref 0.2–0.8)
MONOCYTES RELATIVE PERCENT: 8.6 %
NEUTROPHILS ABSOLUTE: 4.9 K/UL (ref 1.4–6.5)
NEUTROPHILS RELATIVE PERCENT: 79.9 %
PDW BLD-RTO: 15.3 % (ref 11.5–14.5)
PLATELET # BLD: 141 K/UL (ref 130–400)
POTASSIUM SERPL-SCNC: 3.4 MEQ/L (ref 3.4–4.9)
PROTHROMBIN TIME: 15.2 SEC (ref 12.3–14.9)
RBC # BLD: 3.28 M/UL (ref 4.2–5.4)
SODIUM BLD-SCNC: 137 MEQ/L (ref 135–144)
TOTAL PROTEIN: 8.4 G/DL (ref 6.3–8)
WBC # BLD: 6.1 K/UL (ref 4.8–10.8)

## 2021-02-16 PROCEDURE — 70450 CT HEAD/BRAIN W/O DYE: CPT

## 2021-02-16 PROCEDURE — 87635 SARS-COV-2 COVID-19 AMP PRB: CPT

## 2021-02-16 PROCEDURE — G0378 HOSPITAL OBSERVATION PER HR: HCPCS

## 2021-02-16 PROCEDURE — 3044F HG A1C LEVEL LT 7.0%: CPT | Performed by: NURSE PRACTITIONER

## 2021-02-16 PROCEDURE — 99316 NF DSCHRG MGMT 30 MIN+: CPT | Performed by: NURSE PRACTITIONER

## 2021-02-16 PROCEDURE — 85730 THROMBOPLASTIN TIME PARTIAL: CPT

## 2021-02-16 PROCEDURE — 36415 COLL VENOUS BLD VENIPUNCTURE: CPT

## 2021-02-16 PROCEDURE — 99220 PR INITIAL OBSERVATION CARE/DAY 70 MINUTES: CPT | Performed by: SURGERY

## 2021-02-16 PROCEDURE — G8484 FLU IMMUNIZE NO ADMIN: HCPCS | Performed by: NURSE PRACTITIONER

## 2021-02-16 PROCEDURE — 71250 CT THORAX DX C-: CPT

## 2021-02-16 PROCEDURE — 93005 ELECTROCARDIOGRAM TRACING: CPT | Performed by: PHYSICIAN ASSISTANT

## 2021-02-16 PROCEDURE — 99285 EMERGENCY DEPT VISIT HI MDM: CPT

## 2021-02-16 PROCEDURE — 85610 PROTHROMBIN TIME: CPT

## 2021-02-16 PROCEDURE — 80053 COMPREHEN METABOLIC PANEL: CPT

## 2021-02-16 PROCEDURE — 72125 CT NECK SPINE W/O DYE: CPT

## 2021-02-16 PROCEDURE — 85025 COMPLETE CBC W/AUTO DIFF WBC: CPT

## 2021-02-16 PROCEDURE — 74176 CT ABD & PELVIS W/O CONTRAST: CPT

## 2021-02-16 ASSESSMENT — ENCOUNTER SYMPTOMS
PHOTOPHOBIA: 0
VOICE CHANGE: 0
COUGH: 0
SHORTNESS OF BREATH: 0
ABDOMINAL DISTENTION: 0
BACK PAIN: 0
APNEA: 0
ANAL BLEEDING: 0
EYE DISCHARGE: 0

## 2021-02-16 NOTE — LETTER
INCIDENTAL FINDINGS REPORT  02/19/21    [facility] record number: [id number]        Dear Olga Lawson:    While reviewing the diagnostic tests performed by the [address] [facility], we discovered an abnormality that is not associated with the your current reason for admission. You have received a copy of the report from the diagnostic test(s), CT abdomen/pelvis with the abnormality(s) listed below. 1. There is fatty atrophy of the pancreas. 2. Subtle nodularity of the liver suggests cirrhosis. Per our discussion, you have been notified of these incidental findings and have agreed to follow up with a Primary Care Physician. If a Primary Care Physician is needed, please call [phone]. For any questions or concerns, please call our office at [phone].     Sincerely,        Sandy Pham PA-C  0 [facility]  Emergency General Surgery Service    cc:  Medical Records      I have read and understand the above recommendations:        Signature (Relationship to patient)

## 2021-02-16 NOTE — ED TRIAGE NOTES
Pt presents to ED from home with c/o weakness. Pt states that she was just d/c'd from [facility] today and upon leaving the facility she almost fell and noted that she was weak. Pt also admits to mid-sternal chest pain that started today; pt admits to SOB, nausea, and becoming diaphoretic at onset. Upon assessment, pt is A/Ox4, skin p/w/d, resp even and unlabored, msp's intact. Pt denies n/v/d, fever, or chills.

## 2021-02-17 ENCOUNTER — APPOINTMENT (OUTPATIENT)
Dept: GENERAL RADIOLOGY | Age: 61
DRG: 542 | End: 2021-02-17
Payer: MEDICARE

## 2021-02-17 LAB
GFR AFRICAN AMERICAN: 20
GFR NON-AFRICAN AMERICAN: 16
GLUCOSE BLD-MCNC: 175 MG/DL (ref 60–115)
GLUCOSE BLD-MCNC: 229 MG/DL (ref 60–115)
GLUCOSE BLD-MCNC: 277 MG/DL (ref 60–115)
GLUCOSE BLD-MCNC: 291 MG/DL (ref 60–115)
PERFORMED ON: ABNORMAL
POC CREATININE: 2.9 MG/DL (ref 0.6–1.2)
POC SAMPLE TYPE: ABNORMAL
SARS-COV-2, NAAT: DETECTED

## 2021-02-17 PROCEDURE — 73060 X-RAY EXAM OF HUMERUS: CPT

## 2021-02-17 PROCEDURE — 94664 DEMO&/EVAL PT USE INHALER: CPT

## 2021-02-17 PROCEDURE — 6370000000 HC RX 637 (ALT 250 FOR IP): Performed by: INTERNAL MEDICINE

## 2021-02-17 PROCEDURE — 94150 VITAL CAPACITY TEST: CPT

## 2021-02-17 PROCEDURE — G0378 HOSPITAL OBSERVATION PER HR: HCPCS

## 2021-02-17 PROCEDURE — 97162 PT EVAL MOD COMPLEX 30 MIN: CPT

## 2021-02-17 PROCEDURE — 6370000000 HC RX 637 (ALT 250 FOR IP): Performed by: NURSE PRACTITIONER

## 2021-02-17 PROCEDURE — 97166 OT EVAL MOD COMPLEX 45 MIN: CPT

## 2021-02-17 PROCEDURE — 94761 N-INVAS EAR/PLS OXIMETRY MLT: CPT

## 2021-02-17 PROCEDURE — 6370000000 HC RX 637 (ALT 250 FOR IP): Performed by: SURGERY

## 2021-02-17 PROCEDURE — 2580000003 HC RX 258: Performed by: SURGERY

## 2021-02-17 PROCEDURE — 93010 ELECTROCARDIOGRAM REPORT: CPT | Performed by: INTERNAL MEDICINE

## 2021-02-17 RX ORDER — PROMETHAZINE HYDROCHLORIDE 12.5 MG/1
12.5 TABLET ORAL EVERY 6 HOURS PRN
Status: DISCONTINUED | OUTPATIENT
Start: 2021-02-17 | End: 2021-02-24 | Stop reason: HOSPADM

## 2021-02-17 RX ORDER — ASPIRIN 81 MG/1
81 TABLET ORAL DAILY
Status: DISCONTINUED | OUTPATIENT
Start: 2021-02-17 | End: 2021-02-17 | Stop reason: SDUPTHER

## 2021-02-17 RX ORDER — DIPHENHYDRAMINE HCL 25 MG
25 TABLET ORAL EVERY 12 HOURS PRN
Status: DISCONTINUED | OUTPATIENT
Start: 2021-02-17 | End: 2021-02-24 | Stop reason: HOSPADM

## 2021-02-17 RX ORDER — SODIUM CHLORIDE 0.9 % (FLUSH) 0.9 %
10 SYRINGE (ML) INJECTION EVERY 12 HOURS SCHEDULED
Status: DISCONTINUED | OUTPATIENT
Start: 2021-02-17 | End: 2021-02-24 | Stop reason: HOSPADM

## 2021-02-17 RX ORDER — LANOLIN ALCOHOL/MO/W.PET/CERES
3 CREAM (GRAM) TOPICAL NIGHTLY PRN
Status: DISCONTINUED | OUTPATIENT
Start: 2021-02-17 | End: 2021-02-17 | Stop reason: CLARIF

## 2021-02-17 RX ORDER — INSULIN GLARGINE 100 [IU]/ML
25 INJECTION, SOLUTION SUBCUTANEOUS NIGHTLY
Status: DISCONTINUED | OUTPATIENT
Start: 2021-02-17 | End: 2021-02-18

## 2021-02-17 RX ORDER — POLYETHYLENE GLYCOL 3350 17 G/17G
17 POWDER, FOR SOLUTION ORAL DAILY PRN
Status: DISCONTINUED | OUTPATIENT
Start: 2021-02-17 | End: 2021-02-21

## 2021-02-17 RX ORDER — ISOSORBIDE MONONITRATE 60 MG/1
60 TABLET, EXTENDED RELEASE ORAL DAILY
Status: DISCONTINUED | OUTPATIENT
Start: 2021-02-17 | End: 2021-02-24 | Stop reason: HOSPADM

## 2021-02-17 RX ORDER — ACETAMINOPHEN 325 MG/1
650 TABLET ORAL EVERY 4 HOURS PRN
Status: DISCONTINUED | OUTPATIENT
Start: 2021-02-17 | End: 2021-02-24 | Stop reason: HOSPADM

## 2021-02-17 RX ORDER — ACETAMINOPHEN 325 MG/1
650 TABLET ORAL EVERY 4 HOURS PRN
Status: DISCONTINUED | OUTPATIENT
Start: 2021-02-17 | End: 2021-02-17

## 2021-02-17 RX ORDER — LANOLIN ALCOHOL/MO/W.PET/CERES
400 CREAM (GRAM) TOPICAL DAILY
Status: DISCONTINUED | OUTPATIENT
Start: 2021-02-17 | End: 2021-02-17

## 2021-02-17 RX ORDER — TRAMADOL HYDROCHLORIDE 50 MG/1
50 TABLET ORAL EVERY 6 HOURS PRN
Status: DISCONTINUED | OUTPATIENT
Start: 2021-02-17 | End: 2021-02-24 | Stop reason: HOSPADM

## 2021-02-17 RX ORDER — ATORVASTATIN CALCIUM 40 MG/1
40 TABLET, FILM COATED ORAL DAILY
Status: DISCONTINUED | OUTPATIENT
Start: 2021-02-17 | End: 2021-02-24 | Stop reason: HOSPADM

## 2021-02-17 RX ORDER — SODIUM CHLORIDE 0.9 % (FLUSH) 0.9 %
10 SYRINGE (ML) INJECTION PRN
Status: DISCONTINUED | OUTPATIENT
Start: 2021-02-17 | End: 2021-02-24 | Stop reason: HOSPADM

## 2021-02-17 RX ORDER — RANOLAZINE 500 MG/1
500 TABLET, EXTENDED RELEASE ORAL 2 TIMES DAILY
Status: DISCONTINUED | OUTPATIENT
Start: 2021-02-17 | End: 2021-02-24 | Stop reason: HOSPADM

## 2021-02-17 RX ORDER — PREGABALIN 75 MG/1
75 CAPSULE ORAL 2 TIMES DAILY
Status: DISCONTINUED | OUTPATIENT
Start: 2021-02-17 | End: 2021-02-24 | Stop reason: HOSPADM

## 2021-02-17 RX ORDER — PANTOPRAZOLE SODIUM 20 MG/1
20 TABLET, DELAYED RELEASE ORAL DAILY
Status: DISCONTINUED | OUTPATIENT
Start: 2021-02-17 | End: 2021-02-24 | Stop reason: HOSPADM

## 2021-02-17 RX ORDER — DOCUSATE SODIUM 100 MG/1
100 CAPSULE, LIQUID FILLED ORAL 2 TIMES DAILY
Status: DISCONTINUED | OUTPATIENT
Start: 2021-02-17 | End: 2021-02-24 | Stop reason: HOSPADM

## 2021-02-17 RX ORDER — ASPIRIN 81 MG/1
81 TABLET, CHEWABLE ORAL DAILY
Status: DISCONTINUED | OUTPATIENT
Start: 2021-02-17 | End: 2021-02-24 | Stop reason: HOSPADM

## 2021-02-17 RX ORDER — TRAMADOL HYDROCHLORIDE 50 MG/1
100 TABLET ORAL EVERY 6 HOURS PRN
Status: DISCONTINUED | OUTPATIENT
Start: 2021-02-17 | End: 2021-02-24 | Stop reason: HOSPADM

## 2021-02-17 RX ORDER — LIDOCAINE 4 G/G
1 PATCH TOPICAL DAILY
Status: DISCONTINUED | OUTPATIENT
Start: 2021-02-17 | End: 2021-02-24 | Stop reason: HOSPADM

## 2021-02-17 RX ORDER — CHOLECALCIFEROL (VITAMIN D3) 125 MCG
5 CAPSULE ORAL NIGHTLY PRN
Status: DISCONTINUED | OUTPATIENT
Start: 2021-02-17 | End: 2021-02-24 | Stop reason: HOSPADM

## 2021-02-17 RX ORDER — ACETAMINOPHEN 80 MG
TABLET,CHEWABLE ORAL ONCE
Status: COMPLETED | OUTPATIENT
Start: 2021-02-17 | End: 2021-02-17

## 2021-02-17 RX ORDER — UBIDECARENONE 75 MG
100 CAPSULE ORAL DAILY
Status: DISCONTINUED | OUTPATIENT
Start: 2021-02-17 | End: 2021-02-24 | Stop reason: HOSPADM

## 2021-02-17 RX ORDER — DIPHENHYDRAMINE HCL 25 MG
25 TABLET ORAL EVERY 6 HOURS PRN
Status: DISCONTINUED | OUTPATIENT
Start: 2021-02-17 | End: 2021-02-17

## 2021-02-17 RX ORDER — SENNA PLUS 8.6 MG/1
1 TABLET ORAL NIGHTLY
Status: DISCONTINUED | OUTPATIENT
Start: 2021-02-17 | End: 2021-02-24 | Stop reason: HOSPADM

## 2021-02-17 RX ORDER — ONDANSETRON 2 MG/ML
4 INJECTION INTRAMUSCULAR; INTRAVENOUS EVERY 6 HOURS PRN
Status: DISCONTINUED | OUTPATIENT
Start: 2021-02-17 | End: 2021-02-24 | Stop reason: HOSPADM

## 2021-02-17 RX ORDER — CHOLECALCIFEROL (VITAMIN D3) 10 MCG
1 TABLET ORAL DAILY
Status: DISCONTINUED | OUTPATIENT
Start: 2021-02-17 | End: 2021-02-24 | Stop reason: HOSPADM

## 2021-02-17 RX ORDER — LANOLIN ALCOHOL/MO/W.PET/CERES
400 CREAM (GRAM) TOPICAL DAILY
Status: DISCONTINUED | OUTPATIENT
Start: 2021-02-17 | End: 2021-02-24 | Stop reason: HOSPADM

## 2021-02-17 RX ORDER — LIDOCAINE 4 G/G
1 PATCH TOPICAL DAILY
Status: DISCONTINUED | OUTPATIENT
Start: 2021-02-17 | End: 2021-02-17 | Stop reason: SDUPTHER

## 2021-02-17 RX ORDER — IPRATROPIUM BROMIDE AND ALBUTEROL SULFATE 2.5; .5 MG/3ML; MG/3ML
1 SOLUTION RESPIRATORY (INHALATION) EVERY 6 HOURS PRN
Status: DISCONTINUED | OUTPATIENT
Start: 2021-02-17 | End: 2021-02-17

## 2021-02-17 RX ORDER — ARIPIPRAZOLE 5 MG/1
5 TABLET ORAL DAILY
Status: DISCONTINUED | OUTPATIENT
Start: 2021-02-17 | End: 2021-02-24 | Stop reason: HOSPADM

## 2021-02-17 RX ADMIN — TRAMADOL HYDROCHLORIDE 100 MG: 50 TABLET, FILM COATED ORAL at 02:54

## 2021-02-17 RX ADMIN — DOCUSATE SODIUM 100 MG: 100 CAPSULE, LIQUID FILLED ORAL at 22:20

## 2021-02-17 RX ADMIN — INSULIN GLARGINE 25 UNITS: 100 INJECTION, SOLUTION SUBCUTANEOUS at 22:20

## 2021-02-17 RX ADMIN — RANOLAZINE 500 MG: 500 TABLET, FILM COATED, EXTENDED RELEASE ORAL at 22:20

## 2021-02-17 RX ADMIN — PREGABALIN 75 MG: 75 CAPSULE ORAL at 09:29

## 2021-02-17 RX ADMIN — Medication 400 MG: at 10:28

## 2021-02-17 RX ADMIN — ISOSORBIDE MONONITRATE 60 MG: 60 TABLET, EXTENDED RELEASE ORAL at 10:28

## 2021-02-17 RX ADMIN — Medication 10 ML: at 09:00

## 2021-02-17 RX ADMIN — INSULIN LISPRO 6 UNITS: 100 INJECTION, SOLUTION INTRAVENOUS; SUBCUTANEOUS at 16:52

## 2021-02-17 RX ADMIN — Medication: at 03:00

## 2021-02-17 RX ADMIN — METOPROLOL TARTRATE 12.5 MG: 25 TABLET, FILM COATED ORAL at 09:29

## 2021-02-17 RX ADMIN — NEPHROCAP 1 MG: 1 CAP ORAL at 10:28

## 2021-02-17 RX ADMIN — ARIPIPRAZOLE 5 MG: 5 TABLET ORAL at 09:30

## 2021-02-17 RX ADMIN — DIPHENHYDRAMINE HCL 25 MG: 25 TABLET ORAL at 02:54

## 2021-02-17 RX ADMIN — INSULIN LISPRO 6 UNITS: 100 INJECTION, SOLUTION INTRAVENOUS; SUBCUTANEOUS at 12:27

## 2021-02-17 RX ADMIN — TRAMADOL HYDROCHLORIDE 100 MG: 50 TABLET, FILM COATED ORAL at 12:34

## 2021-02-17 RX ADMIN — TRAMADOL HYDROCHLORIDE 100 MG: 50 TABLET, FILM COATED ORAL at 18:53

## 2021-02-17 RX ADMIN — INSULIN LISPRO 2 UNITS: 100 INJECTION, SOLUTION INTRAVENOUS; SUBCUTANEOUS at 10:44

## 2021-02-17 RX ADMIN — TICAGRELOR 90 MG: 90 TABLET ORAL at 22:20

## 2021-02-17 RX ADMIN — METOPROLOL TARTRATE 12.5 MG: 25 TABLET, FILM COATED ORAL at 22:20

## 2021-02-17 RX ADMIN — ATORVASTATIN CALCIUM 40 MG: 40 TABLET, FILM COATED ORAL at 09:30

## 2021-02-17 RX ADMIN — Medication 100 MCG: at 18:53

## 2021-02-17 RX ADMIN — DIPHENHYDRAMINE HCL 25 MG: 25 TABLET ORAL at 23:27

## 2021-02-17 RX ADMIN — SENNOSIDES 8.6 MG: 8.6 TABLET, FILM COATED ORAL at 22:20

## 2021-02-17 RX ADMIN — Medication 3 MG: at 02:54

## 2021-02-17 RX ADMIN — DIPHENHYDRAMINE HCL 25 MG: 25 TABLET ORAL at 10:28

## 2021-02-17 RX ADMIN — SERTRALINE HYDROCHLORIDE 50 MG: 50 TABLET ORAL at 09:00

## 2021-02-17 RX ADMIN — PANTOPRAZOLE SODIUM 20 MG: 20 TABLET, DELAYED RELEASE ORAL at 09:29

## 2021-02-17 RX ADMIN — ASPIRIN 81 MG CHEWABLE TABLET 81 MG: 81 TABLET CHEWABLE at 09:30

## 2021-02-17 RX ADMIN — PREGABALIN 75 MG: 75 CAPSULE ORAL at 22:20

## 2021-02-17 ASSESSMENT — PAIN DESCRIPTION - PAIN TYPE: TYPE: ACUTE PAIN

## 2021-02-17 ASSESSMENT — PAIN SCALES - GENERAL
PAINLEVEL_OUTOF10: 7
PAINLEVEL_OUTOF10: 7
PAINLEVEL_OUTOF10: 10
PAINLEVEL_OUTOF10: 9

## 2021-02-17 NOTE — CONSULTS
[address]                      [address], [address] [facility]                                  CONSULTATION    PATIENT NAME: Kinjal Jj                  :        1960  MED REC NO:   [id number]                            ROOM:       [id number]  ACCOUNT NO:   [de-identified]                           ADMIT DATE: 2021  PROVIDER:     Jesus Manuel Chen DO    CONSULT DATE:  2021    RENAL CONSULT    HISTORY OF PRESENT ILLNESS:  A 20-year-old  female admitted to  the hospital after falling at home. The patient struck her ribs, and  evidently according to her, has five fractures, nothing documented on  the chart at this time. She does have end-stage renal disease for which  she receives hemodialysis therapy on Tuesday, Thursday, Saturday through  her right arm fistula. The patient has multiple medical problems  including that of coronary artery disease with heart failure, reduced  ejection fraction. Known history of seizures, history of viral  hepatitis C. The patient has known hypertension, hyperlipidemia,  diabetes mellitus type 2. The patient is overweight. Her only  complaint at this time is right chest wall pain from the fall. She has  had a history of previous COVID-19 in the past approximately 4-6 weeks  ago. PAST MEDICAL HISTORY:  End-stage renal disease, coronary artery disease,  heart failure, hyperlipidemia, hypertension, schizophrenia,  osteoporosis, viral hepatitis C diagnosis, history of seizures, diabetes  mellitus type 2, obesity, history of COVID-19. PAST SURGICAL HISTORY:  Right fistula, right first toe amputation,  hysterectomy, multiple angioplasties, , left knee arthroplasty. HABITS:  No smoking, no alcohol use, no nonsteroidals.     MEDICATIONS:  At the time of her admission to the hospital would be  melatonin, Lipitor, aspirin, Norvasc, Imdur, Flovent, Zoloft, Ventolin, Nephrocaps, Brilinta, Abilify, Ranexa, Lantus insulin, NovoLog coverage,  nystatin powder, Lyrica. ALLERGIES TO MEDICATIONS:  CODEINE and OXYCONTIN. REVIEW OF SYSTEMS AND FAMILY HISTORY:  Noncontributory. PHYSICAL EXAMINATION:  VITAL SIGNS:  5 feet 7 inches, 200 pounds. Blood pressure 140/60, heart  rate 75, respirations 16, afebrile. HEENT:  Normocephalic. Pupils equal and react to light. Extraocular  muscles intact. NECK:  Supple. No JVD or adenopathy. CHEST:  The lungs are clear. Chest wall shows positive tenderness at  the right thoracic cage. CARDIOVASCULAR:  Heart is regular, 1/6 systolic murmur distant in  nature. ABDOMEN:  Soft, obese. No guarding or rigidity. Bowel sounds are  present. EXTREMITIES:  Showed no edema. SKIN:  Warm and dry. Right arm fistula with bruit and thrill. IMPRESSION:  End-stage renal disease, Tuesday, Thursday, Saturday  dialysis. Right arm fistula with bruit and thrill, organic heart  disease, coronary artery disease with reduced heart failure. Chest wall  contusion, rule out fractures. Diabetes mellitus type 2. History of  hepatitis C, history of COVID-19, obesity. PLAN:  Dialysis tomorrow, Lidoderm patches to ribs, discharge at  discretion based on primary diagnosis.         Quita Navarro DO    D: 02/17/2021 10:47:29       T: 02/17/2021 10:55:06     [email or web address]  Job#: [id number]     Doc#: [id number]    CC:

## 2021-02-17 NOTE — ED PROVIDER NOTES
easily. Psychiatric/Behavioral: Negative for behavioral problems, self-injury and sleep disturbance. All other systems reviewed and are negative. Except as noted above the remainder of the review of systems was reviewed and negative. PAST MEDICAL HISTORY     Past Medical History:   Diagnosis Date    Anxiety     CAD S/P percutaneous coronary angioplasty 2015, 2018    stents per dr Wyatt Mak    CH[facility]estive heart failure) [facility]     CKD (chronic kidney disease) stage 4, GFR 15-29 ml/min ([address] 2/24/2018    CKD stage 4 due to type 2 diabetes mellitus ([address]     COPD (chronic obstructive pulmonary disease) ([address]     Diabetic nephropathy with proteinuria ([address] 2014    DJD (degenerative joint disease) of knee     Dr Quentin Hargrove GERD (gastroesophageal reflux disease)     Hemiparesis, left ([facility] 2013    [facility] ([facility])    Hemodialysis patient [facility])     History of heart failure     History of seizures     History of type C viral hepatitis     HTN (hypertension)     Hyperlipidemia     Impaired mobility and activities of daily living     Mediastinal lymphadenopathy 2013    Dr Reji Lacy, [place]    Metabolic syndrome     Moderate persistent asthma without complication 4/04/2433    Neurogenic urinary incontinence 2013    Neuropathy in diabetes ([address]     Obesity (BMI 30-39. 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 Thomson Necessary      x1 Dr. Arjun Pacheco, Dr Brisa Horton    [address] CATH LAB PROCEDURE  10/02/2019    HYSTERECTOMY, TOTAL ABDOMINAL      one ovary intact, Dr Max Fraga, menorrhagia    DE TOTAL KNEE ARTHROPLASTY Left 2018    LEFT KNEE TOTAL KNEE ARTHROPLASTY, SHAYNA, NERVE BLOCK performed by James Sahu MD at [address] [place] [address] Right     TOTAL KNEE ARTHROPLASTY  16    Dr Deana Andino TUNNELED [address] Right 2020    tunneled HD catheter per Dr Michelle Rivers       Previous Medications    ALBUTEROL SULFATE HFA (VENTOLIN HFA) 108 (90 BASE) MCG/ACT INHALER    Inhale 2 puffs into the lungs every 6 hours as needed for Wheezing    AMLODIPINE (NORVASC) 10 MG TABLET    TAKE 1 TABLET BY MOUTH DAILY    ARIPIPRAZOLE (ABILIFY) 5 MG TABLET    TAKE 1 TABLET BY MOUTH DAILY    ASPIRIN 81 MG TABLET    Take 1 tablet by mouth daily    ATORVASTATIN (LIPITOR) 40 MG TABLET    Take 1 tablet by mouth daily    B COMPLEX-C-FOLIC ACID (NEPHRO VITAMINS) 0.8 MG TABS    Take by mouth daily     BLOOD GLUCOSE MONITORING SUPPL (FREESTYLE LITE) CORETTA    1 Device by Does not apply route daily as needed (Diabetes) Use freestyle meter to test blood sugar as needed    BLOOD GLUCOSE TEST STRIPS (FREESTYLE LITE) STRIP    1 each by Does not apply route 4 times daily (before meals and nightly) As needed.     BRILINTA 90 MG TABS TABLET    TAKE 1 TABLET BY MOUTH 2 TIMES DAILY    FLUTICASONE (FLOVENT HFA) 110 MCG/ACT INHALER    Inhale 2 puffs into the lungs 2 times daily    INSULIN ASPART (NOVOLOG FLEXPEN) 100 UNIT/ML INJECTION PEN    INJECT 6 units with each meals    ISOSORBIDE MONONITRATE (IMDUR) 60 MG EXTENDED RELEASE TABLET    Take 1 tablet by mouth daily    LANTUS SOLOSTAR 100 UNIT/ML INJECTION PEN    Inject 45 Units into the skin nightly    MAGNESIUM OXIDE (MAG-OX) 400 MG TABLET    Take 400 mg by mouth daily    MELATONIN 3 MG TABS TABLET    TAKE 1 TABLET BY MOUTH EVERY NIGHT AS NEEDED FOR INSOMNIA    METOPROLOL TARTRATE (LOPRESSOR) 25 MG TABLET    Take 0.5 tablets by mouth 2 times daily    MISC. DEVICES (BARIATRIC ROLLATOR) MISC    Rolllator with a basket    MIS. DEVICES [facility]) MIS    To use with transport outside of the house.     NITROGLYCERIN (NITROSTAT) 0.4 MG SL TABLET    Place 1 tablet under the tongue every 5 minutes as needed for Chest pain    NYSTATIN (NYAMYC) 519444 UNIT/GM POWDER    APPLY TO ABDOMINAL FOLDS EVERY 12 HOURS AS NEEDED    PANTOPRAZOLE (PROTONIX) 20 MG TABLET    TAKE 1 TABLET BY MOUTH DAILY    PREGABALIN (LYRICA) 75 MG CAPSULE    TAKE ONE (1) CAPSULE BY MOUTH TWICE DAILY    RANOLAZINE (RANEXA) 500 MG EXTENDED RELEASE TABLET    Take 1 tablet by mouth 2 times daily    SERTRALINE (ZOLOFT) 50 MG TABLET    TAKE 1 TABLET BY MOUTH DAILY    SURE COMFORT PEN NEEDLES 30G X 8 MM MISC    USE AS DIRECTED FIVE TIMES A DAY    TRIAMCINOLONE (KENALOG) 0.1 % CREAM    APPLY TO AFFECTED AREAS TWICE DAILY AS DIRECTED    VITAMIN B-12 (CYANOCOBALAMIN) 100 MCG TABLET    Take 1 tablet by mouth daily       ALLERGIES     Codeine and Oxycontin [oxycodone hcl]    FAMILY HISTORY       Family History   Problem Relation Age of Onset    Cancer Mother 76        survived   Larson Hypertension Father     Diabetes Sister     Mental Illness Sister           SOCIAL HISTORY       Social History     Socioeconomic History    Marital status:      Spouse name: None    Number of children: 2    Years of education: None    Highest education level: None   Occupational History    Occupation: disabled   Social Needs    Financial resource strain: None    Food insecurity     Worry: None     Inability: None    Transportation needs     Medical: None     Non-medical: None   Tobacco Use    Smoking status: Passive Smoke Exposure - Never Smoker    Smokeless tobacco: Never Used   Substance and Sexual Activity    Alcohol use: No     Alcohol/week: 0.0 standard drinks    Drug use: No    Sexual activity: Not Currently   Lifestyle    Physical activity     Days per week: None     Minutes per session: None    Stress: None   Relationships    Social connections     Talks on phone: None     Gets together: None     Attends Rastafari service: None     Active member of club or organization: None     Attends meetings of clubs or organizations: None     Relationship status: None    Intimate partner violence     Fear of current or ex partner: None     Emotionally abused: None     Physically abused: None     Forced sexual activity: None   Other Topics Concern    None   Social History Narrative    Born in [place], one of 5    Twin sister Reyes, very ill in 2018, [place] [address]    Moved to [facility], , 2 children, one son and one daughter    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    Disabled due to mental illness    Lived at [facility], was discharged, returned to independent living in 2017 in the daughter's house and has adjusted well    One son and one daughter, live in the same house with patient, Lucia Mazariegos pays the rent    [employer] reading (misteries)       SCREENINGS      @GBZO([id number])@      PHYSICAL EXAM    (up to 7 for level 4, 8 or more for level 5)     ED Triage Vitals [02/16/21 1826]   BP Temp Temp Source Pulse Resp SpO2 Height Weight   (!) 156/91 98 °F (36.7 °C) Temporal 79 19 100 % 5' 7\" (1.702 m) 200 lb (90.7 kg)       Physical Exam  Vitals signs and nursing note reviewed. Constitutional:       General: She is not in acute distress. Appearance: She is well-developed. HENT:      Head: Normocephalic and atraumatic.       Right Ear: Tympanic membrane normal.      Left Ear: Tympanic membrane normal.      Nose: Nose normal.      Mouth/Throat:      Mouth: Mucous membranes are moist.      Pharynx: No oropharyngeal exudate or posterior oropharyngeal erythema. Eyes:      General:         Right eye: No discharge. Left eye: No discharge. Extraocular Movements: Extraocular movements intact. Pupils: Pupils are equal, round, and reactive to light. Neck:      Musculoskeletal: Normal range of motion and neck supple. Muscular tenderness present. Comments: C5-C7 tenderness. Cardiovascular:      Rate and Rhythm: Normal rate and regular rhythm. Heart sounds: Normal heart sounds. Pulmonary:      Effort: Pulmonary effort is normal. No respiratory distress. Breath sounds: Normal breath sounds. No stridor. Chest:      Chest wall: Tenderness present. Abdominal:      General: Bowel sounds are normal. There is no distension. Palpations: Abdomen is soft. Tenderness: There is abdominal tenderness. There is no right CVA tenderness or left CVA tenderness. Comments: Right abdominal and chest wall bruising and tenderness. Musculoskeletal: Normal range of motion. Skin:     General: Skin is warm. Findings: Bruising present. No erythema. Neurological:      Mental Status: She is alert and oriented to person, place, and time. Motor: No weakness. Psychiatric:         Mood and Affect: Mood normal.         DIAGNOSTIC RESULTS     EKG: All EKG's are interpreted by the Emergency Department Physician who either signs or Co-signsthis chart in the absence of a cardiologist.    Sinus rhythm first-degree AV block. With the 298 ms WV interval  ms.   ms.     RADIOLOGY:   Non-plain filmimages such as CT, Ultrasound and MRI are read by the radiologist. Plain radiographic images are visualized and preliminarily interpreted by the emergency physician with the below findings:    See CAT scan preliminary reports    Interpretation per the Radiologist below, if available at the time ofthis note:    CT Head WO Contrast    (Results Pending)   CT CERVICAL SPINE WO CONTRAST    (Results Pending)   CT ABDOMEN PELVIS WO CONTRAST Additional Contrast? None    (Results Pending)   CT CHEST WO CONTRAST    (Results Pending)         ED BEDSIDE ULTRASOUND:   Performed by ED Physician - none    LABS:  Labs Reviewed   COVID-19, RAPID - Abnormal; Notable for the following components:       Result Value    SARS-CoV-2, NAAT DETECTED (*)     All other components within normal limits    Narrative:     Kristy Cruz  [facility] tel. [phone],  Microbiology results called to and read back by FLAQUITO Agosto, 02/17/2021 00:26,  by [address] [facility] - Abnormal; Notable for the following components:    Chloride 92 (*)     Glucose 164 (*)     CREATININE 2.52 (*)     GFR Non- 19.3 (*)     GFR  23.3 (*)     Calcium 10.1 (*)     Total Protein 8.4 (*)     Total Bilirubin 1.0 (*)     Globulin 3.8 (*)     All other components within normal limits   CBC WITH AUTO DIFFERENTIAL - Abnormal; Notable for the following components:    RBC 3.28 (*)     Hemoglobin 10.2 (*)     Hematocrit 29.7 (*)     RDW 15.3 (*)     Lymphocytes Absolute 0.6 (*)     All other components within normal limits   PROTIME-INR - Abnormal; Notable for the following components:    Protime 15.2 (*)     All other components within normal limits   APTT       All other labs were within normal range or not returned as of this dictation.     EMERGENCY DEPARTMENT COURSE and DIFFERENTIAL DIAGNOSIS/MDM:   Vitals:    Vitals:    02/16/21 2230 02/16/21 2300 02/16/21 2330 02/17/21 0000   BP: (!) 139/59 (!) 137/59 (!) 142/63 (!) 140/58   Pulse: 80 78 79 79   Resp: 16 15 16 16   Temp:       TempSrc:       SpO2: 98% 100% 100% 100%   Weight:       Height:                MDM  Number of Diagnoses or Management Options  Closed fracture of multiple ribs of right side, initial encounter  Contusion of right chest wall, initial encounter  Right upper quadrant abdominal pain  Stage 5 chronic kidney disease on chronic dialysis ([facility]  Diagnosis management comments: Patient dialysis patient Tuesday Thursday Saturday she sees Dr. Julissa Small, I discussed with Dr. Merly Barnhart, he approves IV contrast if patient admitted with a consult his group if not patient to contact nephrology tomorrow. dw Dr Costa Reading re multiple fractures on ct and t11. Fx/. He will admit patient       Amount and/or Complexity of Data Reviewed  Clinical lab tests: reviewed and ordered  Tests in the radiology section of CPT®: ordered and reviewed  Discuss the patient with other providers: yes            CONSULTS:  IP CONSULT TO INTERNAL MEDICINE  IP CONSULT TO NEPHROLOGY    PROCEDURES:  Unless otherwise noted below, none     Procedures    FINAL IMPRESSION      1. Contusion of right chest wall, initial encounter    2. Right upper quadrant abdominal pain    3. Closed fracture of multiple ribs of right side, initial encounter    4. Stage 5 chronic kidney disease on chronic dialysis (HCC)          DISPOSITION/PLAN   DISPOSITION        PATIENT REFERRED TO:  No follow-up provider specified.     DISCHARGE MEDICATIONS:  New Prescriptions    No medications on file          (Please note that portions of this note were completed with a voice recognition program.  Efforts were made to edit the dictations but occasionally words are mis-transcribed.)    Douglas Osborne PA-C (electronically signed)  Attending Emergency Physician       Douglas Osborne PA-C  02/17/21 0128

## 2021-02-17 NOTE — PROGRESS NOTES
Physical Therapy Med Surg Initial Assessment  Fac[facility]Department[facility]  [facility]       NAME: Maral Granados  : 1960 (23 y.o.)  MRN: [id number]  CODE STATUS: Full Code    Date of Service: 2021    Patient Diagnosis(es): Chest wall contusion, unspecified laterality, initial encounter [V36.198Z]   Chief Complaint   Patient presents with    Extremity Weakness     Patient Active Problem List    Diagnosis Date Noted    Chest wall contusion, unspecified laterality, initial encounter 2021    Falls frequently 2021    Post PTCA     COVID-19 2020    Thrush 2020    Moderate persistent asthma without complication     Weakness 2020    Difficulty in walking 2020    ESRD (end stage renal disease) on dialysis ([address] 2020    Renal failure 2020    Recurrent falls 2020    Chest pain 2020    Edema 2019    Closed supracondylar fracture of right humerus 2019    Other chronic pain 2019    Palliative care patient 2019    Class 2 severe obesity with serious comorbidity and body mass index (BMI) of 36.0 to 36.9 in adult [facility]) 2019    Sleep apnea 2019    Pulmonary hypertension ([address]     Diastolic congestive heart failure ([address] 10/04/2019    Uncontrolled type 2 diabetes mellitus with hyperglycemia (HCC)     Shortness of breath 10/02/2019    Tardive dyskinesia 2019    Chronic painful diabetic neuropathy (HCC)     Angina, class II ([address]     Stented coronary artery-plan is to stay on Plavix indefinately per Dr Citlaly Cristobal 2016    History of seizures     Urinary incontinence due to cognitive impairment     Essential hypertension     History of type C viral hepatitis     Diabetic nephropathy with proteinuria ([address]     Incontinence of urine 2014    Vitamin D insufficiency 2014    Vitamin B 12 deficiency 2013    Cerebral microvascular disease 06/05/2013    Hemiparesis, left ([address] 01/01/2013    Coronary artery disease involving native heart with angina pectoris (HCC)     Schizophrenia, paranoid, chronic     Metabolic syndrome     Mixed hyperlipidemia 12/09/2010    Other hammer toe (acquired) 12/13/2007        Past Medical History:   Diagnosis Date    Anxiety     CAD S/P percutaneous coronary angioplasty 2015, 2018    stents per dr Earl Arias    CHF (congestive heart failure) ([address]     CKD (chronic kidney disease) stage 4, GFR 15-29 ml/min ([address] 2/24/2018    CKD stage 4 due to type 2 diabetes mellitus ([address]     COPD (chronic obstructive pulmonary disease) ([address]     Diabetic nephropathy with proteinuria ([address] 2014    DJD (degenerative joint disease) of knee     Dr Jed Damon GERD (gastroesophageal reflux disease)     Hemiparesis, left ([address] 2013    entered Assisted Living ([facility])    [facility]od[facility])     History of heart failure     History of seizures     History of type C viral hepatitis     HTN (hypertension)     Hyperlipidemia     Impaired mobility and activities of daily living     Mediastinal lymphadenopathy 2013    Tabatha Nunez    Metabolic syndrome     Moderate persistent asthma without complication 8/40/4851    Neurogenic urinary incontinence 2013    Neuropathy in diabetes ([address]     Obesity (BMI 30-39. 9)     Recurrent UTI     S/P colonoscopy 2014    [facility], focal active colitis    Schizophrenia, paranoid, chronic ([address]     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 Avilez [facility]      x1 Dr. Jenn Casey, Dr Claudette Mc CATH LAB PROCEDURE  10/02/2019    HYSTERECTOMY, TOTAL ABDOMINAL      one ovary intact, Dr Scarlet Bonilla, menorrhagia    [place] TOTAL KNEE ARTHROPLASTY Left 2018    LEFT KNEE TOTAL KNEE ARTHROPLASTY, SHAYNA, NERVE BLOCK performed by Rufina Sellers MD at [address] [place] [address] Right     TOTAL KNEE ARTHROPLASTY  16    Dr Bobbi Crespo [address] Right 2020    tunneled HD catheter per Dr Alen Elmore: Yes  Patient assessed for rehabilitation services?: Yes  Family / Caregiver Present: No  General Comment  Comments: pt lethargic; PT/OT co-eval    Restrictions:  Restrictions/Precautions: Fall Risk(High per phillips; droplet +)     SUBJECTIVE:      Pain  Pre Treatment Pain Screening  Pain at present: 7(chest pain due to coughing)  Intervention List: Patient able to continue with treatment;Nurse/physician notified  Comments / Details: Pt stated nurse provided pain meds directly before therapy eval    Post Treatment Pain Screening:   Pain Screening  Patient Currently in Pain: Yes  Pain Assessment  Pain Level:  7(chest pain due to coughing)    Prior Level of Function:  Social/Functional History  Lives With: Daughter(reports never home alone)  Type of Home: House  Home Layout: Two level  Home Access: Ramped entrance  Bathroom Shower/Tub: Tub/Shower unit  Bathroom Equipment: Shower chair  Home Equipment: 4 wheeled walker, Hospital bed  ADL Assistance: Needs assistance(assistance washing back and socks/shoes)  Homemaking Assistance: Needs assistance(dtr completes)  Homemaking Responsibilities: No  Ambulation Assistance: Independent(rollator)  Transfer Assistance: Independent  Active : No  Patient's  Info: takes with bus with a ramp  Additional Comments: Has family assist- 7 people at home per case management; pt is inconsistent historian    OBJECTIVE:   Vision: Impaired  Vision Exceptions: Wears glasses for reading  Hearing: Within functional limits    Cognition:  Overall Orientation Status: Within Functional Limits  Follows Commands: Within Functional Limits    Observation/Palpation  Observation: Pt alert and attentive, pleasant; no SOB or dizziness with activity; slurs speech at times due to lethargy    ROM:  RLE AROM: WFL  LLE AROM : WFL    Strength:  Strength RLE  Comment: grossly 4/5  Strength LLE  Comment: grossly 3+/5    Neuro:  Balance  Posture: Fair(increased trunk flexion with gait)  Sitting - Static: Good  Sitting - Dynamic: Good  Standing - Static: Fair;-  Standing - Dynamic: Fair;-(requires B UE support to maintain steadiness during standing)     Tone RLE  RLE Tone: Normotonic  Tone LLE  LLE Tone: Normotonic  Sensation  Overall Sensation Status: Impaired(hands and feet)         Transfers  Sit to Stand: Stand by assistance  Stand to sit: Stand by assistance  Comment: [employer]    Ambulation  Ambulation?: Yes  Ambulation 1  Surface: level tile  Device: Rolling Walker  Assistance: Contact guard assistance  Comments: difficulty maintaining safe distance inside AD and exhibits increased trunk flexion    Activity Tolerance  Activity Tolerance: Patient limited by fatigue    PT Education  PT Education: Goals;PT Role;Transfer Training;Plan of Care;General Safety;Gait Training;Functional Mobility Training    ASSESSMENT:   Body structures, Functions, Activity limitations: Decreased functional mobility ; Decreased safe awareness;Decreased balance;Decreased endurance; Increased pain;Decreased strength;Decreased sensation;Decreased posture  Decision Making: Medium Complexity  History: high  Exam: med  Clinical Presentation: med    Prognosis: Good    DISCHARGE RECOMMENDATIONS:  Discharge Recommendations: Continue to assess pending progress, Patient would benefit from continued therapy after discharge    Assessment: Pt exhibits decreased LE strength, increased chest pain, decreased standing posture, decreased balance with carryover to increased assistance with all functional mobility. Continued PT is indicated. REQUIRES PT FOLLOW UP: Yes      PLAN OF CARE:  Plan  Times per week: 3-6  Current Treatment Recommendations: Strengthening, Transfer Training, Endurance Training, Neuromuscular Re-education, Patient/Caregiver Education & Training, ROM, Wheelchair Mobility Training, Manual Therapy - Soft Tissue Mobilization, Pain Management, Equipment Evaluation, Education, & procurement, Balance Training, Gait Training, Home Exercise Program, Functional Mobility Training, Safety Education & Training, Positioning  Safety Devices  Type of devices: Call light within reach, Bed alarm in place, Left in bed    Goals:  Patient goals : \"go home\"  Long term goals  Long term goal 1: Pt will demonstrate amb SBA with safest AD 50ft to allow pt to amb inside her home with safety. Long term goal 2: Pt will demonstrate transfers SBA with safest AD to increase pt safety with functional mobility. Long term goal 3: Pt will demonstrate TGUG > or = 13 sec indicating decreased risk for falls. [facility] (6 CLICK) BASIC MOBILITY  AM-PAC Inpatient Mobility Raw Score : 18    Therapy Time:   Individual   Time In 5216   Time Out 1451   [facility], [address] [facility], 02/17/21 at 3:36 PM         Definitions for assistance levels  Independent = pt does not require any physical supervision or assistance from another person for activity completion. Device may be needed.   Stand by assistance = pt requires verbal cues or instructions from another person, close to but not touching, to perform the activity  Minimal assistance= pt performs 75% or more of the activity; assistance is required to complete the activity  Moderate assistance= pt performs 50% of the activity; assistance is required to complete the activity  Maximal assistance = pt performs 25% of the activity; assistance is required to complete the activity  Dependent = pt requires total physical assistance to accomplish the task

## 2021-02-17 NOTE — PROGRESS NOTES
[facility]   Facility/Department: [address]  Speech Language Pathology    NAME:Michelle Ospina Face  : 1960  ROOM: [id number]/[id number]      DATE: 2021      This patient is currently in Observation/Outpatient Status. Due to Medicare, other insurance and Hospital Guidelines, a written order with a therapy specific diagnosis (dysphagia, dysarthria, aphasia) must be attached to the order before we can initiate the evaluation. Re-order speech therapy with the appropriate diagnosis, as needed. Discussed with Roberta HUDDLESTON. Per Roberta she is unsure why order was placed. Roberta to speak with MD to determine if ST eval is needed. If needed Errol Plata will add diagnosis.      Thank you,  Valeri Lucero, CCC-SLP, Date: 2021, Time: 11:00 AM

## 2021-02-17 NOTE — H&P
Trauma Consult / H & P Note    BASIC INJURY INFORMATION:  Level of activation: Trauma Consult  Mode of transport: EMS  Mechanism of injury: Fall from Standing  Complicating features: NA  Protective measures: NA    HISTORY OF PRESENT INJURY:   Modesta Lopez is a 59 y.o. female w/multiple medical problems including ESRD on HD, CAD, CHF, DM, obesity who presents after partial fall while getting into car after discharge from rehab following a prolonged hospitalization with COVID. Pt is complaining of moderate Right chest wall pain, constant, sharp, worse with movement/pressure. ED did CT scan with prelim read concerning for multiple rib fxs and trauma consulted. PRIMARY SURVEY:  Airway: Intact  Breathing: Normal   Breath Sounds: Breath Sounds Equal Bilaterally  Circulation:    Pulses: Normal   Skin: Normal skin color, texture and turgor  Disability:   Pupils: PERRL   GCS:    Best Eyes: 4    Best Verbal: 5    Best Motor: 6    Total: 15    SECONDARY SURVEY:  Vitals:   Vitals:    02/16/21 2030 02/16/21 2130 02/16/21 2200 02/16/21 2230   BP: (!) 133/57 (!) 141/62 (!) 137/57 (!) 139/59   Pulse: 83 81 81 80   Resp: 18 18 20 16   Temp:       TempSrc:       SpO2: 97% 98% 95% 98%   Weight:       Height:         Neurologic: Alert and Oriented, Appropriate and Moves all Extremities  HEENT:   Head: No lacerations, bony step-offs, or abrasions   Eyes: EOM intact   Ears: Not Examined   Nose: No bloody discharge; Throat: Oral cavity without trauma  Neck: No midline tenderness  Pulmonary: Additional findings: right chest wall tenderness and ecchymosis  Cardiovascular:    Pulses: Bilateral radial, femoral, DP and PT pulses are normal;  Abdomen: Appearance: Non-distended, No scars, lacerations, contusions;    Rectal: Not performed  Pelvis/Perineum: Pelvis is stable to palpation;  Musculoskeletal:    Back/Spine: Additional findings: no deformity   Extremities: No gross upper or lower extremity signs of trauma;    PAST MEDICAL HISTORY:  Past Medical History:   Diagnosis Date    Anxiety     CAD S/P percutaneous coronary angioplasty , 2018    stents per dr Shahrzad Arzola    CHF (congestive heart failure) ([address]     CKD (chronic kidney disease) stage 4, GFR 15-29 ml/min ([address] 2018    CKD stage 4 due to type 2 diabetes mellitus ([address]     COPD (chronic obstructive pulmonary disease) ([address]     Diabetic nephropathy with proteinuria ([address]     DJD (degenerative joint disease) of knee     Dr Hopkins Miss GERD (gastroesophageal reflux disease)     Hemiparesis, left ([address] 2013    entered Assisted Living ([facility])    Hemodialysis patient [facility])     History of heart failure     History of seizures     History of type C viral hepatitis     HTN (hypertension)     Hyperlipidemia     Impaired mobility and activities of daily living     Mediastinal lymphadenopathy     Jorge Valentino    Metabolic syndrome     Moderate persistent asthma without complication 3496    Neurogenic urinary incontinence 2013    Neuropathy in diabetes ([address]     Obesity (BMI 30-39. 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 Laila Agustin      x1 Dr. Sofía Jon, Dr Lorrie Marcelo CATH LAB PROCEDURE  10/02/2019    HYSTERECTOMY, TOTAL ABDOMINAL      one ovary intact, Dr Baron Goode, menorrhagia    DC TOTAL KNEE ARTHROPLASTY Left 6/21/2018    LEFT KNEE TOTAL KNEE ARTHROPLASTY, SHAYNA, NERVE BLOCK performed by Glen Campbell MD at [address] [place] [address] Right     TOTAL KNEE ARTHROPLASTY  05/19/16    Dr Whitney Speaker TUNNELED [address] Right 07/01/2020    tunneled HD catheter per Dr Holly Encarnacion       PRE-ADMISSION MEDICATIONS:   Prior to Admission medications    Medication Sig Start Date End Date Taking? Authorizing Provider   pregabalin (LYRICA) 75 MG capsule TAKE ONE (1) CAPSULE BY MOUTH TWICE DAILY 1/22/21 4/22/21  Man Hansen APRN - CNP   triamcinolone (KENALOG) 0.1 % cream APPLY TO AFFECTED AREAS TWICE DAILY AS DIRECTED 1/20/21   Corinne Alexanders, MD   [facility]) 732239 UNIT/GM powder APPLY TO ABDOMINAL FOLDS EVERY 12 HOURS AS NEEDED 1/20/21   Corinne Alexanders, MD   blood glucose test strips (FREESTYLE LITE) strip 1 each by Does not apply route 4 times daily (before meals and nightly) As needed. 1/18/21   MARCELO Norman   vitamin B-12 (CYANOCOBALAMIN) 100 MCG tablet Take 1 tablet by mouth daily  Patient taking differently: Take 100 mcg by mouth daily At night 1/18/21 1/18/22  Mariela Staples PA-C   insulin aspart (NOVOLOG FLEXPEN) 100 UNIT/ML injection pen INJECT 6 units with each meals 1/14/21   Sonal Joseluis, DO   LANTUS SOLOSTAR 100 UNIT/ML injection pen Inject 45 Units into the skin nightly 1/14/21   Sonal Huggins, DO   metoprolol tartrate (LOPRESSOR) 25 MG tablet Take 0.5 tablets by mouth 2 times daily 1/14/21   Sonal Joseluis, DO   Misc.  Devices (BARIATRIC ROLLATOR) MISC Rolllator with a basket 1/12/21   Corinne Alexanders, MD   Blood Glucose Monitoring Suppl (FREESTYLE LITE) CORETTA 1 Device by Does not apply route daily as needed (Diabetes) Use freestyle meter to test blood sugar as needed 12/29/20   MARCELO Norman   ranolazine (RANEXA) 500 MG extended release tablet Take 1 tablet by mouth 2 times daily 11/24/20   Kiya Sparrow MD   ARIPiprazole (ABILIFY) 5 MG tablet TAKE 1 TABLET BY MOUTH DAILY 11/3/20   Yossi Bates MD   BRILINTA 90 MG TABS tablet TAKE 1 TABLET BY MOUTH 2 TIMES DAILY 11/2/20   Jace Onofre MD   pantoprazole (PROTONIX) 20 MG tablet TAKE 1 TABLET BY MOUTH DAILY 11/2/20   Jace Onofre MD   B Complex-C-Folic Acid (NEPHRO VITAMINS) 0.8 MG TABS Take by mouth daily     Historical Provider, MD   albuterol sulfate HFA (VENTOLIN HFA) 108 (90 Base) MCG/ACT inhaler Inhale 2 puffs into the lungs every 6 hours as needed for Wheezing    Historical Provider, MD   sertraline (ZOLOFT) 50 MG tablet TAKE 1 TABLET BY MOUTH DAILY 9/23/20   Yossi Bates MD   fluticasone (FLOVENT HFA) 110 MCG/ACT inhaler Inhale 2 puffs into the lungs 2 times daily  Patient taking differently: Inhale 2 puffs into the lungs 2 times daily as needed  9/23/20 9/23/21  Yossi Bates MD   Misc. Devices [facility]) MISC To use with transport outside of the house.  9/8/20   Yossi Bates MD   isosorbide mononitrate (IMDUR) 60 MG extended release tablet Take 1 tablet by mouth daily 7/27/20   Kiya Sparrow MD   amLODIPine (NORVASC) 10 MG tablet TAKE 1 TABLET BY MOUTH DAILY 6/1/20   Jace Onofre MD   aspirin 81 MG tablet Take 1 tablet by mouth daily 5/4/20   Yossi Bates MD   atorvastatin (LIPITOR) 40 MG tablet Take 1 tablet by mouth daily 5/4/20   Yossi Bates MD   SURE COMFORT PEN NEEDLES 30G X 8 MM MISC USE AS DIRECTED FIVE TIMES A DAY 4/1/20   Yossi Bates MD   melatonin 3 MG TABS tablet TAKE 1 TABLET BY MOUTH EVERY NIGHT AS NEEDED FOR INSOMNIA 3/17/20   Yossi Bates MD   nitroGLYCERIN (NITROSTAT) 0.4 MG SL tablet Place 1 tablet under the tongue every 5 minutes as needed for Chest pain 9/22/15   Historical Provider, MD   magnesium oxide (MAG-OX) 400 MG tablet Take 400 mg by mouth daily    Historical Provider, MD       ALLERGIES:  Codeine and Oxycontin [oxycodone hcl]    SOCIAL HISTORY:   Social History     Socioeconomic History    Marital status:      Spouse name: None    Number of children: 2    Years of education: None    Highest education level: None   Occupational History    Occupation: disabled   Social Needs    Financial resource strain: None    Food insecurity     Worry: None     Inability: None    Transportation needs     Medical: None     Non-medical: None   Tobacco Use    Smoking status: Passive Smoke Exposure - Never Smoker    Smokeless tobacco: Never Used   Substance and Sexual Activity    Alcohol use: No     Alcohol/week: 0.0 standard drinks    Drug use: No    Sexual activity: Not Currently   Lifestyle    Physical activity     Days per week: None     Minutes per session: None    Stress: None   Relationships    Social connections     Talks on phone: None     Gets together: None     Attends Yarsanism service: None     Active member of club or organization: None     Attends meetings of clubs or organizations: None     Relationship status: None    Intimate partner violence     Fear of current or ex partner: None     Emotionally abused: None     Physically abused: None     Forced sexual activity: None   Other Topics Concern    None   Social History Narrative    Born in [place], one of 5    Twin sister Reyes, very ill in 2018, [place] [id number]    Moved to [facility], , 2 children, one son and one daughter    Worked at [employer], as a nurse's aide    Disabled due to mental illness    Lived at [facility], was discharged, returned to independent living in 2017 in the daughter's house and has adjusted well    One son and one daughter, live in the same house with patient, Shelby Santos pays the rent    [email or web address] reading (misteries)       FAMILY HISTORY:  Family History   Problem Relation Age of Onset    Cancer Mother 76        survived   Scheryl Gemma Hypertension Father     Diabetes Sister     Mental Illness Sister            REVIEW OF SYSTEMS:      Review of Systems   Constitutional: Positive for activity change. HENT: Negative. Eyes: Negative. Respiratory: Positive for chest tightness. Cardiovascular: Negative. Gastrointestinal: Negative. Endocrine: Negative. Genitourinary: On hemodialysis   Musculoskeletal: Positive for myalgias. Skin: Negative. Neurological: Negative. Hematological: Bruises/bleeds easily. Psychiatric/Behavioral: Negative. Constitutional: Negative for  weight loss  HENT: Negative for congestion, facial swelling and bloody nose  Eyes: Negative for  vision changes  Respiratory: Negative for shortness of breath, difficulty breathing  Cardiovascular: Negative for chest wall pain. Gastrointestinal: Negative for abdominal distention, abdominal pain and vomiting. Genitourinary: Negative for  hematuria  Musculoskeletal: Negative for gait difficulties   Skin: Negative for bruising, abrasions  Neurological: Negative for dizziness, weakness and light-headedness. Hematological: Negative for easy bruising/bleeding  Psychiatric/Behavioral: Negative for behavioral problems. Except as noted above the remainder of the review of systems was reviewed and negative.          BASIC LABS:    CBC with Differential:    Lab Results   Component Value Date    WBC 6.1 02/16/2021    RBC 3.28 02/16/2021    HGB 10.2 02/16/2021    HCT 29.7 02/16/2021     02/16/2021    MCV 90.6 02/16/2021    MCH 31.1 02/16/2021    MCHC 34.4 02/16/2021    RDW 15.3 02/16/2021    NRBC 0.0 10/16/2020    BANDSPCT 5 06/14/2013    LYMPHOPCT 10.1 02/16/2021    MONOPCT 8.6 02/16/2021    EOSPCT 3.5 03/05/2020    BASOPCT 0.4 02/16/2021    MONOSABS 0.5 02/16/2021    LYMPHSABS 0.6 02/16/2021    EOSABS 0.1 02/16/2021    BASOSABS 0.0 02/16/2021     CMP:    Lab Results   Component Value Date     02/16/2021    K 3.4 02/16/2021    K 3.9 01/22/2021    CL 92 02/16/2021    CO2 31 02/16/2021    BUN 22 02/16/2021    CREATININE 2.52 02/16/2021    GFRAA 23.3 02/16/2021    LABGLOM 19.3 02/16/2021    GLUCOSE 164 02/16/2021    GLUCOSE 102 10/16/2020    PROT 8.4 02/16/2021    LABALBU 4.6 02/16/2021    CALCIUM 10.1 02/16/2021    BILITOT 1.0 02/16/2021    ALKPHOS 127 02/16/2021    AST 23 02/16/2021    ALT 16 02/16/2021     BMP:    Lab Results   Component Value Date     02/16/2021    K 3.4 02/16/2021    K 3.9 01/22/2021    CL 92 02/16/2021    CO2 31 02/16/2021    BUN 22 02/16/2021    LABALBU 4.6 02/16/2021    CREATININE 2.52 02/16/2021    CALCIUM 10.1 02/16/2021    GFRAA 23.3 02/16/2021    LABGLOM 19.3 02/16/2021    GLUCOSE 164 02/16/2021    GLUCOSE 102 10/16/2020     Magnesium:  Lab Results   Component Value Date    MG 2.1 01/22/2021     Troponin:    Lab Results   Component Value Date    TROPONINI <0.010 01/13/2021     INR:    Recent Labs     02/16/21  1915   INR 1.2           RADIOLOGY:  Preliminary chest CT with concern for multiple right sided acute vs chronic rib fxs and chest wall contusion    ASSESSMENT:  Pt is 57yo F w/hx of multiple medical problems including ESRD on HD, CAD, CHF, DM who apparently fell getting into a car leaving rehab following prior COVID admission found to have chest wall contusion and possible multiple rib fxs (acute vs chronic)      PLAN:  57yo F with multiple medical problems who fell from standing getting into car with chest wall contusion and acute vs chronic rib fxs    -admit for pulmonary toilet/monitoring/encourage IS  -pain control  -per ED nephrology aware of admission  -PT/OT  -f/u final CT reads    [address] [facility]

## 2021-02-17 NOTE — PROGRESS NOTES
Physical Therapy  Facility/Department: [facility] [place] MED SURG G316/C645-11      PT evaluation attempted 2/17/21, at 8:14 AM EST, however this patient status is currently observation. Per facility protocol, PT evaluation and treatment orders for patients in observation status must include a PT specific diagnosis (i.e. \"difficulty ambulating\", \"lack of coordination\", etc.) These order specifications may be added to the \"comments\" section of the PT evaluation and treatment order. Requested updated Physical Therapy orders from referring provider via \"sticky note\". Coordination team notified.      We thank you for your referral!    [address] [place] [address],  [address] Physical Therapy Department    Electronically signed by Pebbles Rosas PT on 2/17/21 at 8:14 AM EST

## 2021-02-17 NOTE — CARE COORDINATION
Per Rene Patterson, the patient was discharged yesterday with [facility] and [place] from [facility]. The LSW talked with the patient's daughter, Cassandra So. The patient's daughter states the patient went from being discharged at [facility] to the ED at [facility]. The patient's daughter states the aide at [facility] said the patient had been weak and that she should take the patient to the ED. The patient lives at home (prior to being at [facility]) with 7 other people. The patient does not have O2 and uses a rollator. The patient goes to [facility] on [address], Thurs and Saturday for dialysis. The patient obtains her prescriptions from [facility] in [place].  The patient's daughter verbally signed the [address]. Electronically signed by HIPOLITO Pierce on 2/17/21 at 12:47 PM EST    The LSW returned a phone call to [place],  with [facility] ([phone]). Romney states if the patient stays in observation, they do NOT need a new HHC order. The patient's HHC order was for RN, and PT/OT. [facility],  would like called when the patient is discharged. Electronically signed by HIPOLITO Pierce on 2/17/21 at 3:04 PM EST    OT states patient can go home as long as someone walks with the patient. The patient told OT she has a hospital bed an rollator at home.  Electronically signed by Alvino Gentile on 2/17/21 at 3:08 PM EST

## 2021-02-17 NOTE — CONSULTS
Hospital Medicine  History and Physical    Patient:  Corrine Mendoza  MRN: [id number]    CHIEF COMPLAINT:    Chief Complaint   Patient presents with    Extremity Weakness       History Obtained From:  Patient, EMR  Primary Care Physician: Vargas Cole MD    HISTORY OF PRESENT ILLNESS:   The patient is a 59 y.o. female was admitted by the trauma service after a fall and chest wall contusion, recent rib fracture. The patient has multiple medical problems that therefore hospitalist consult was requested. The patient has end-stage renal disease on hemodialysis. Patient has hypertension and hyperlipidemia. Patient has diabetes. Patient tested positive for COVID-19. Patient has hepatitis C. No  palpitation. No abdominal pain, nausea or vomiting. No fever or chills. No hematemesis or melena. No headaches, loss of consciousness or seizure.     Past Medical History:      Diagnosis Date    Anxiety     CAD S/P percutaneous coronary angioplasty 2015, 2018    stents per dr Gab Webb    CHF (congestive heart failure) ([address]     CKD (chronic kidney disease) stage 4, GFR 15-29 ml/min ([address] 2/24/2018    CKD stage 4 due to type 2 diabetes mellitus ([address]     COPD (chronic obstructive pulmonary disease) ([address]     Diabetic nephropathy with proteinuria ([address] 2014    DJD (degenerative joint disease) of knee     Dr Staci Pro GERD (gastroesophageal reflux disease)     Hemiparesis, left ([address] 2013    entered Assisted Living ([facility])    Hemodialysis patient [facility])     History of heart failure     History of seizures     History of type C viral hepatitis     HTN (hypertension)     Hyperlipidemia     Impaired mobility and activities of daily living     Mediastinal lymphadenopathy 2013    Juany Francois    Metabolic syndrome     Moderate persistent asthma without complication 4/84/8166    Neurogenic urinary incontinence 2013    Neuropathy in diabetes ([address]     Obesity (BMI (CYANOCOBALAMIN) 100 MCG tablet Take 1 tablet by mouth daily  Patient taking differently: Take 100 mcg by mouth daily At night 1/18/21 1/18/22  Mariela Staples PA-C   insulin aspart (NOVOLOG FLEXPEN) 100 UNIT/ML injection pen INJECT 6 units with each meals 1/14/21   Toula Sima, DO   LANTUS SOLOSTAR 100 UNIT/ML injection pen Inject 45 Units into the skin nightly 1/14/21   Toula Sima, DO   metoprolol tartrate (LOPRESSOR) 25 MG tablet Take 0.5 tablets by mouth 2 times daily 1/14/21   Toula Sima, DO   Misc. Devices (BARIATRIC ROLLATOR) MISC Rolllator with a basket 1/12/21   Akin Dick MD   Blood Glucose Monitoring Suppl (FREESTYLE LITE) CORETTA 1 Device by Does not apply route daily as needed (Diabetes) Use freestyle meter to test blood sugar as needed 12/29/20   MARCELO Greenberg   ranolazine (RANEXA) 500 MG extended release tablet Take 1 tablet by mouth 2 times daily 11/24/20   Phylicia Juarez MD   ARIPiprazole (ABILIFY) 5 MG tablet TAKE 1 TABLET BY MOUTH DAILY 11/3/20   Akin Dick MD   BRILINTA 90 MG TABS tablet TAKE 1 TABLET BY MOUTH 2 TIMES DAILY 11/2/20   Rinku Donis MD   pantoprazole (PROTONIX) 20 MG tablet TAKE 1 TABLET BY MOUTH DAILY 11/2/20   Rinku Donis MD   B Complex-C-Folic Acid (NEPHRO VITAMINS) 0.8 MG TABS Take by mouth daily     Historical Provider, MD   albuterol sulfate HFA (VENTOLIN HFA) 108 (90 Base) MCG/ACT inhaler Inhale 2 puffs into the lungs every 6 hours as needed for Wheezing    Historical Provider, MD   sertraline (ZOLOFT) 50 MG tablet TAKE 1 TABLET BY MOUTH DAILY 9/23/20   Akin Dick MD   fluticasone (FLOVENT HFA) 110 MCG/ACT inhaler Inhale 2 puffs into the lungs 2 times daily  Patient taking differently: Inhale 2 puffs into the lungs 2 times daily as needed  9/23/20 9/23/21  Akin Dick MD   [facility]. Devices [facility]) MISC To use with transport outside of the house.  9/8/20   Akin Dick, MD   isosorbide mononitrate (IMDUR) 60 MG extended release tablet Take 1 tablet by mouth daily 7/27/20   Faustino Redmond MD   amLODIPine (NORVASC) 10 MG tablet TAKE 1 TABLET BY MOUTH DAILY 6/1/20   Chary Trejo MD   aspirin 81 MG tablet Take 1 tablet by mouth daily 5/4/20   Shira Whitney MD   atorvastatin (LIPITOR) 40 MG tablet Take 1 tablet by mouth daily 5/4/20   Shira Whitney MD   SURE COMFORT PEN NEEDLES 30G X 8 MM MISC USE AS DIRECTED FIVE TIMES A DAY 4/1/20   Shira Whitney MD   melatonin 3 MG TABS tablet TAKE 1 TABLET BY MOUTH EVERY NIGHT AS NEEDED FOR INSOMNIA 3/17/20   Shira Whitney MD   nitroGLYCERIN (NITROSTAT) 0.4 MG SL tablet Place 1 tablet under the tongue every 5 minutes as needed for Chest pain 9/22/15   Historical Provider, MD   magnesium oxide (MAG-OX) 400 MG tablet Take 400 mg by mouth daily    Historical Provider, MD       Allergies:  Codeine and Oxycontin [oxycodone hcl]    Social History:   TOBACCO:   reports that she is a non-smoker but has been exposed to tobacco smoke. She has never used smokeless tobacco.  ETOH:   reports no history of alcohol use. Family History:       Problem Relation Age of Onset   Ronaldo Reyes Cancer Mother 76        survived   Ronaldo Reyes Hypertension Father     Diabetes Sister     Mental Illness Sister        REVIEW OF SYSTEMS:  Ten systems reviewed and negative except for stated in HPI    Physical Exam:    Vitals: /77   Pulse 76   Temp 97.8 °F (36.6 °C) (Oral)   Resp 19   Ht 5' 7\" (1.702 m)   Wt 200 lb (90.7 kg)   LMP  (LMP Unknown)   SpO2 100%   BMI 31.32 kg/m²   General appearance: alert, appears stated age and cooperative, no apparent distress  Skin: Pale skin color, texture, turgor normal. No rashes or lesions  HEENT: Head: Normocephalic, no lesions, without obvious abnormality.   Neck: no adenopathy, no carotid bruit, no JVD, supple, symmetrical, trachea midline and thyroid not enlarged, symmetric, no tenderness/mass/nodules  Lungs: clear to auscultation bilaterally  Heart: regular rate and rhythm, S1, S2 normal, no murmur, click, rub or gallop  Abdomen: soft, non-tender; bowel sounds normal; no masses,  no organomegaly  Extremities: extremities normal, atraumatic, no cyanosis or edema  Neurologic: Mental status: Alert, oriented, thought content appropriate     Recent Labs     02/16/21 1915   WBC 6.1   HGB 10.2*        Recent Labs     02/16/21 1915 02/16/21 1941     --    K 3.4  --    CL 92*  --    CO2 31  --    BUN 22  --    CREATININE 2.52* 2.9*   GLUCOSE 164*  --    AST 23  --    ALT 16  --    BILITOT 1.0*  --    ALKPHOS 127  --      Troponin T: No results for input(s): TROPONINI in the last 72 hours. ABGs:   Lab Results   Component Value Date    PHART 7.389 05/24/2020    PO2ART 98 05/24/2020    MEC0GDC 32 05/24/2020     INR:   Recent Labs     02/16/21 1915   INR 1.2     URINALYSIS:No results for input(s): NITRITE, COLORU, PHUR, LABCAST, WBCUA, RBCUA, MUCUS, TRICHOMONAS, YEAST, BACTERIA, CLARITYU, SPECGRAV, LEUKOCYTESUR, UROBILINOGEN, BILIRUBINUR, BLOODU, GLUCOSEU, AMORPHOUS in the last 72 hours. Invalid input(s): [facility]  -----------------------------------------------------------------   Ct Abdomen Pelvis Wo Contrast Additional Contrast? None    Result Date: 2/17/2021  EXAM: CT of the chest abdomen and pelvis without contrast History: Pain after a fall Technique: CT of the chest abdomen and pelvis was performed without contrast from the thoracic inlet through the ischial tuberosities. Multiplanar reformats were obtained. Comparison: CT chest abdomen pelvis from January 19, 2021 Findings: CHEST: Visualized portion of the thyroid gland is within normal limits. No axillary, mediastinal, or hilar lymphadenopathy. No thoracic aortic aneurysm. Evidence of coronary artery disease. Heart size is normal. Mitral annular calcification. Trace pericardial effusion is unchanged.  Esophagus is within exiting: car. Headache Comparison:  CT brain, January 19, 2021, January 3, 2021, August 3, 2020 Findings: Extra-axial spaces:  Normal. Intracranial hemorrhage:  None. Ventricular system:  Normal for age. Basal Cisterns:  Normal. Cerebral Parenchyma:  Normal. Midline Shift:  None. Cerebellum:  Normal. Paranasal sinuses and mastoid air cells: Partial opacification, ethmoid sinuses, and near complete opacification bilateral maxillary sinuses, unchanged. Partial opacification, mastoid air cells, mildly progressed since prior study. Visualized Orbits:  Normal. Visualized upper cervical spine:  Normal. Sella:  Normal. Skull base:  Normal.     Impression: Pansinusitis. Mild interval progression left mastoiditis. All CT scans at this facility use dose modulation, iterative reconstruction, and/or weight based dosing when appropriate to reduce radiation dose to as low as reasonably achievable. Ct Chest Wo Contrast    Result Date: 2/17/2021  EXAM: CT of the chest abdomen and pelvis without contrast History: Pain after a fall Technique: CT of the chest abdomen and pelvis was performed without contrast from the thoracic inlet through the ischial tuberosities. Multiplanar reformats were obtained. Comparison: CT chest abdomen pelvis from January 19, 2021 Findings: CHEST: Visualized portion of the thyroid gland is within normal limits. No axillary, mediastinal, or hilar lymphadenopathy. No thoracic aortic aneurysm. Evidence of coronary artery disease. Heart size is normal. Mitral annular calcification. Trace pericardial effusion is unchanged. Esophagus is within normal limits. No pulmonary nodule or mass. No consolidation, pleural effusion, or pneumothorax. Bibasilar atelectasis and/or scarring. Compression deformity of T11 with loss of height of approximately 50% is unchanged from prior CT. Chronic deformities of lateral right ribs 4 through 9.  No acute osseous abnormality of the chest. ABDOMEN/PELVIS: Lack of intravenous contrast precludes optimal evaluation of the abdominal and pelvic viscera. Subtle nodularity of the liver suggests cirrhosis. Spleen is at the upper limits of normal in size measuring approximately 14 cm in length. The stomach, gallbladder, and adrenal glands are within normal limits. There is fatty atrophy of the pancreas. The pancreas is otherwise unremarkable. The unenhanced kidneys demonstrate perinephric stranding is unchanged from prior examination, but are otherwise unremarkable. No urinary tract calculi or hydronephrosis. Urinary bladder is well distended. The uterus is surgically absent. Abdominal aorta is nonaneurysmal and demonstrates atherosclerotic calcification. No retroperitoneal or abdominal/pelvic lymphadenopathy. No small bowel obstruction. No overt colonic mass or pericolonic inflammation. No findings of acute appendicitis. No free fluid or free air. Bilateral flank/gluteal region subcutaneous soft tissue edema. Persistent contusions given the provided history of fall no acute osseous the abdomen/pelvis. Mild degenerative changes of the lumbar spine. No acute intrathoracic or intra-abdominal process. Chronic right rib deformities and unchanged compression deformity of T11 when compared to CT of January 19, 2021. Findings suggesting cirrhosis. All CT scans at this facility use dose modulation, iterative reconstruction, and/or weight based dosing when appropriate to reduce radiation dose to as low as reasonably achievable. Ct Cervical Spine Wo Contrast    Result Date: 2/17/2021  CT cervical spine without intravenous contrast medium. HISTORY: Fall. Neck pain. TECHNICAL FACTORS: CT cervical spine obtained and formatted as 2.5 mm contiguous axial images from skull base to the level of. Sagittal and coronal reconstructions were obtained during postprocessing. No contrast medium was utilized. COMPARISON: CT cervical spine, October 8, 2017, January 19, 2021.  FINDINGS: Cervical vertebral bodies are normal in height and alignment. Loss of cervical lordosis, with mild reversal and apex at C4, unchanged. Atlantooccipital articulation maintained. Atlantoaxial interval preserved. Neural foraminal narrowing, left C3-4, left C5-6, left C6-7. Diffuse disc space narrowing, C4-5, through T1-T2, with mild interval progression. Anterior osteophytes C3-C6 again identified. No fractures, dislocations, bone lesions. Limited imaging lung apices without anomaly. Mucosal thickening, bilateral sphenoid sinuses. Carotid arteries and soft tissues are without anomaly. No fracture. Marked degenerative change cervical spine. Sphenoid sinusitis. All CT scans at this facility use dose modulation, iterative reconstruction, and/or weight based dosing when appropriate to reduce radiation dose to as low as reasonably achievable. Assessment and Plan     *End-stage renal disease, volume status, electrolytes, acid-base balance to be addressed by nephrology team.    *Right proximal arm pain and discomfort, rule out fracture. *Chest contusion, muscular skeletal or other organ injury secondary to trauma are to be addressed by the trauma team given their expertise. *Hypertension, stable. *Diabetes. Continue sliding scale coverage. *COVID-19. Negative chest to see me for any infiltrates. No hypoxemia. No indication for Decadron or remdesivir at this time. Continue isolation. *Coronary artery disease. No evidence of active angina. *Anemia, stable, no evidence of acute blood loss. Thank you very much for this consultation. Please call me the benefit of any question.     Patient Active Problem List   Diagnosis Code    Coronary artery disease involving native heart with angina pectoris ([facility]) I25.119    Schizophrenia, paranoid, chronic P73.6    Metabolic syndrome W58.88    Vitamin B 12 deficiency E53.8    Cerebral microvascular disease I67.89    Mixed hyperlipidemia E78.2    Other hammer toe (acquired) M20.40    Vitamin D insufficiency E55.9    Incontinence of urine R32    Diabetic nephropathy with proteinuria ([facility]) E11.21    Essential hypertension I10    History of type C viral hepatitis Z86.19    Urinary incontinence due to cognitive impairment R39.81    History of seizures Z87.898    Stented coronary artery-plan is to stay on Plavix indefinately per Dr Arthur Hernandez Z95.5    Hemiparesis, left ([address] G81.94    Angina, class II ([address] I20.9    Chronic painful diabetic neuropathy ([address] E11.40    Tardive dyskinesia G24.01    Shortness of breath R06.02    Uncontrolled type 2 diabetes mellitus with hyperglycemia ([facility]) Y69.59    Diastolic congestive heart failure ([facility]) I50.30    Sleep apnea G47.30    Pulmonary hypertension ([facility]) I27.20    Class 2 severe obesity with serious comorbidity and body mass index (BMI) of 36.0 to 36.9 in adult ([facility]) E66.01, Z68.36    Edema R60.9    Closed supracondylar fracture of right humerus S42.411A    Other chronic pain G89.29    Palliative care patient Z51.5    Chest pain R07.9    Recurrent falls R29.6    Renal failure N19    Difficulty in walking R26.2    ESRD (end stage renal disease) on dialysis ([facility]) N18.6, Z99.2    Weakness R53.1    Moderate persistent asthma without complication M83.32    Thrush B37.0    COVID-19 U07.1    Post PTCA Z98.61    Falls frequently R29.6    Chest wall contusion, unspecified laterality, initial encounter [address] MD  Admitting Hospitalist    TTS: 85mins where I focused more than 75% of my attention on rendering care, and planning treatment course for this patient, in addition to talking to RN team, mid levels, consulting with other physicians and following up on labs and imaging. High Risk Readmission Screening Tool Score Noted.      Emergency Contact:

## 2021-02-17 NOTE — CONSULTS
Renal consult dictated    ESRDX t-th-sat  OHD HFrEF  Chest wall contusion r/o fractures  Right arm fistula  DM type-2  Hx Hepatits-C  Hx Covid-19  Obesity      Plan dialysis tomorrow  Lidoderm Patch to ribs   discharge at your discretion  Labs reviewed

## 2021-02-17 NOTE — PROGRESS NOTES
([address] 2013    Coronary artery disease involving native heart with angina pectoris (HCC)     Schizophrenia, paranoid, chronic     Metabolic syndrome     Mixed hyperlipidemia 2010    Other hammer toe (acquired) 2007        Past Medical History:   Diagnosis Date    Anxiety     CAD S/P percutaneous coronary angioplasty ,     stents per dr Anju Greer CHF (congestive heart failure) ([address]     CKD (chronic kidney disease) stage 4, GFR 15-29 ml/min ([address] 2018    CKD stage 4 due to type 2 diabetes mellitus ([address]     COPD (chronic obstructive pulmonary disease) ([address]     Diabetic nephropathy with proteinuria ([address]     DJD (degenerative joint disease) of knee     Dr Quentin Hargrove GERD (gastroesophageal reflux disease)     Hemiparesis, left ([address]     entered Assisted Living ([facility])    Hemodialysis patient [facility])     History of heart failure     History of seizures     History of type C viral hepatitis     HTN (hypertension)     Hyperlipidemia     Impaired mobility and activities of daily living     Mediastinal lymphadenopathy     Dr Reji Lacy, Otto Clave    Metabolic syndrome     Moderate persistent asthma without complication 3715    Neurogenic urinary incontinence 2013    Neuropathy in diabetes ([address]     Obesity (BMI 30-39. 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 Raymond Peña      x1 Dr. Bird Joseph, Dr Adrian Aus CATH LAB PROCEDURE  10/02/2019    HYSTERECTOMY, TOTAL ABDOMINAL      one ovary intact, Dr Cris Martinez, menorrhagia    [place] TOTAL KNEE ARTHROPLASTY Left 6/21/2018    LEFT KNEE TOTAL KNEE ARTHROPLASTY, SHAYNA, NERVE BLOCK performed by Dewayne Crowley MD at [address] [place] [address] Right     TOTAL KNEE ARTHROPLASTY  05/19/16    Dr Staci Pro TUNNELED [address] Right 07/01/2020    tunneled HD catheter per Dr Thompson Abo        Restrictions  Restrictions/Precautions: Fall Risk(High per phillips; droplet +)     Safety Devices: Safety Devices  Safety Devices in place: Yes  Type of devices: All fall risk precautions in place        Subjective  Pre Treatment Pain Screening  Pain at present: 7  Scale Used: Numeric Score  Intervention List: Patient able to continue with treatment, Patient declined any intervention  Comments / Details: Pt states she just had pain medication    Pain Reassessment:   Pain Assessment  Patient Currently in Pain: Yes  Pain Assessment: 0-10  Pain Level:  7(Ribcage)  Pain Type: Acute pain  Pain Location: Rib cage       Prior Level of Function:  Social/Functional History  Lives With: Daughter(reports never home alone)  Type of Home: House  Home Layout: Two level  Home Access: Ramped entrance  Bathroom Shower/Tub: Tub/Shower unit  Bathroom Equipment: Shower chair  Home Equipment: 4 wheeled walker, Hospital bed  ADL Assistance: Needs assistance(assistance washing back and socks/shoes)  Homemaking Assistance: Needs assistance(dtr completes)  Homemaking Responsibilities: No  Ambulation Assistance: Independent(rollator)  Transfer Assistance: Independent  Active : No  Patient's  Info: takes with bus with a ramp  Additional Comments: Has family assist- 7 people at home per case management    OBJECTIVE:     Orientation Status:  Orientation  Overall Orientation Status: Within Functional Limits    Observation:  Observation/Palpation  Posture: Good  Observation: Pt alert and attentive, pleasant    Cognition Status:  Cognition  Overall Cognitive Status: Exceptions  Arousal/Alertness: Appropriate responses to stimuli  Following Commands: Follows one step commands consistently, Follows multistep commands with increased time  Attention Span: Difficulty dividing attention  Memory: Appears intact  Safety Judgement: Decreased awareness of need for assistance  Problem Solving: Assistance required to implement solutions  Insights: Decreased awareness of deficits  Initiation: Requires cues for some  Sequencing: Requires cues for some  Cognition Comment: Verbal cues for safety and sequencing for initial use of [employer]    Perception Status:  Perception  Overall Perceptual Status: WFL    Sensation Status:  Sensation  Overall Sensation Status: Impaired(hands and feet)    Vision and Hearing Status:  Vision  Vision: Impaired  Vision Exceptions: Wears glasses for reading  Hearing  Hearing: Within functional limits     ROM:   LUE AROM (degrees)  LUE AROM : WFL  Left Hand AROM (degrees)  Left Hand AROM: WFL  RUE AROM (degrees)  RUE AROM : WFL  Right Hand AROM (degrees)  Right Hand AROM: WFL    Strength:  LUE Strength  Gross LUE Strength: Exceptions to [facility]  L Hand General: 3+/5  LUE Strength Comment: 3+/5 all planes  RUE Strength  Gross RUE Strength: Exceptions to [facility]  R Hand General: 3+/5  RUE Strength Comment: 3+/5 all planes    Coordination, Tone, Quality of Movement:    Tone RUE  RUE Tone: Normotonic  Tone LUE  LUE Tone: Normotonic  Coordination  Movements Are Fluid And Coordinated: No  Coordination and Movement description: Fine motor impairments, Gross motor impairments, Decreased speed, Decreased accuracy, Left UE, Right UE  Quality of Movement Other  Comment: RUE more affected than LUE    Hand Dominance:  Hand Dominance  Hand Dominance: Left    ADL Status:  ADL  Feeding: Setup  Grooming: Supervision  UE Bathing: Supervision  LE Bathing: Minimal assistance  UE Dressing: Supervision  LE Dressing: Minimal assistance  Toileting: Contact guard assistance  Additional Comments: Simulated ADLs as above.  Decreased overall balance, G- safety awareness, difficulty reaching feet at baseline  Toilet Transfers  Toilet Transfer: Unable to assess  Toilet Transfers Comments: Anticipate SBA based on other mobility       Therapy key for assistance levels -   Independent = Pt. is able to perform task with no assistance but may require a device   Stand by assistance = Pt. does not perform task at an independent level but does not need physical assistance, requires verbal cues  Minimal, Moderate, Maximal Assistance = Pt. requires physical assistance (25%, 50%, 75% assist from helper) for task but is able to actively participate in task   Dependent = Pt. requires total assistance with task and is not able to actively participate with task completion     Functional Mobility:  Functional Mobility  Functional - Mobility Device: Rolling Walker  Activity: To/from bathroom  Assist Level: Contact guard assistance  Functional Mobility Comments: Increased time for ambulation; mildly decreased safety awareness in ambulation and difficulty controlling WW; pt. utiilizes rollator at baseline and is unfamiliar with [employer]  Transfers  Sit to stand: Stand by assistance  Stand to sit: Stand by assistance  Transfer Comments: G- safety, increased effort    Bed Mobility  Bed mobility  Supine to Sit: Supervision  Sit to Supine: Stand by assistance  Scooting: Stand by assistance  Comment: HOB elevated, uses hospital bed at baseline    Seated and Standing Balance:  Balance  Sitting Balance: Supervision  Standing Balance: Stand by assistance    Functional Endurance:  Activity Tolerance  Activity Tolerance: Patient Tolerated treatment well    D/C Recommendations:  OT D/C RECOMMENDATIONS  REQUIRES OT FOLLOW UP: Yes    Equipment Recommendations:  OT Equipment Recommendations  Other: Continue to assess    OT Education:   OT Education  OT Education: OT Role, Plan of Care  Patient Education: Educated pt. on role of acute care OT  Barriers to Learning: Mild decreased awareness of deficits    OT Follow Up:  OT D/C RECOMMENDATIONS  REQUIRES OT FOLLOW UP: Yes       Assessment/Discharge Disposition:  Assessment: Pt is a 59year old woman from home with family who presents to [facility] with weakness. Pt. demonstrates the above deficits which impact her ability to perform ADLs and IADLs. Pt. would benefit from continued OT to maximize independence and safety with ADLt asks.   Performance deficits / Impairments: Decreased functional mobility , Decreased ADL status, Decreased strength, Decreased balance, Decreased endurance, Decreased high-level IADLs, Decreased fine motor control, Decreased coordination, Decreased safe awareness, Decreased cognition, Decreased sensation  Prognosis: Good  Discharge Recommendations: Continue to assess pending progress  Decision Making: Medium Complexity  History: Pt's medical history is moderately complex  Exam: Pt. has 11 performance deficits  Assistance / Modification: Pt. requires min A    Six Click Score   How much help for putting on and taking off regular lower body clothing?: A Lot  How much help for Bathing?: A Lot  How much help for Toileting?: A Little  How much help for putting on and taking off regular upper body clothing?: A Little  How much help for taking care of personal grooming?: A Little  How much help for eating meals?: A Little  AM-[facility] Inpatient Daily Activity Raw Score: 16  AM-PAC Inpatient ADL T-Scale Score : 35.96  ADL Inpatient CMS 0-100% Score: 53.32    Plan:  Plan  Times per week: 1-3x  Plan weeks: Length of acute stay  Current Treatment Recommendations: Strengthening, Balance Training, Functional Mobility Training, Endurance Training, Neuromuscular Re-education, Safety Education & Training, Pain Management, Patient/Caregiver Education & Training, Equipment Evaluation, Education, & procurement, Self-Care / ADL, Home Management Training, Cognitive/Perceptual Training    Goals:   Patient will:    - Improve functional endurance to tolerate/complete 30 mins of ADL's  - Be Mod I in UB ADLs   - Be Min A in LB ADLs  - Be Mod I in ADL transfers without LOB  - Be Mod I in toileting tasks  - Improve B hand fine motor coordination to [facility] in order to manage clothing fasteners/self-care containers in a timely manner  - Improve B UE strength and endurance to 4/5 in order to participate in self-care activities as projected. - Access appropriate D/C site with as few architectural barriers as possible. - Sequence self-care tasks with no verbal cues for safety    Patient Goal: Patient goals : \"I want to get home\"     Discussed and agreed upon: Yes Comments:     Therapy Time:   OT Individual Minutes  Time In: 1438  Time Out: 4455 [address]  Minutes: 13    Eval: 13 minutes     Electronically signed by:     CATHY Tony  2/17/2021, 3:10 PM

## 2021-02-17 NOTE — PROGRESS NOTES
TRAUMA DAILY PROGRESS NOTE      Patient Name: Mariela Schumacher  Admission Date 2021    Hospital Day: 0  Patient seen and examined on 2021    INTERVAL HISTORY/EVENTS     Background:  Mariela Schumacher is a 59 y.o. female with PMH significant for CAD s/p stents (, ), ESRD on HD (//Sat), HTN, HLD, DM, Hepatitis C, GERD, COPD, seizures, anxiety and COVID-19. She presented to the ED on 2021 after becoming weak and nearly falling while transferring from a wheelchair to a car. Patient denies falling. Trauma work up found chronic R sided rib fractures as well as stable T11 compression fracture from prior 2021 imaging. Initially there was concern that the rib fractures were new so the patient was admitted to the [facility] under the Trauma service. Nephrology and the inpatient hospitalist service was consulted. Hospital Course:  2021: Presented to ED after becoming weak while transferring from a wheelchair to a car. Denies traumatic injury/fall. Admitted to the [facility] under the Trauma service. 24 Hour Events: This is my first time meeting this patient. No acute events reported overnight per nursing or the patient. Patient reports mild right chest wall pain, otherwise no specific complaints. She denies any additional chest pain or shortness of breath. She does report generalized weakness. Labs reviewed from admission. No new labs this AM.    Intake: No volume recorded  Output: No volume recorded   BM: No occurrences      PHYSICAL EXAM     Vitals Average, Min and Max for last 24 hours:  Temp: Temp: 97.8 °F (36.6 °C);  Temp  Av.8 °F (36.6 °C)  Min: 97.7 °F (36.5 °C)  Max: 98 °F (36.7 °C)  Respirations: Resp  Av.2  Min: 14  Max: 20  Pulse: Pulse  Av.9  Min: 78  Max: 83  Blood Pressure: Systolic (54CAC), ONS:013 , Min:117 , WNN:216   ; Diastolic (98FWU), FNJ:45, Min:50, Max:91    SpO2: SpO2  Av.7 %  Min: 95 %  Max: 100 %    24hr Intake/Output: No intake or output Value Date    MG 2.1 01/22/2021     PT/INR:    Lab Results   Component Value Date    PROTIME 15.2 02/16/2021    INR 1.2 02/16/2021     PTT:    Lab Results   Component Value Date    APTT 34.1 02/16/2021       IMAGING RESULTS (PERSONALLY REVIEWED)     CT abdomen/pelvis (final read): Lack of intravenous contrast precludes optimal evaluation of the abdominal and pelvic viscera. Subtle nodularity of the liver suggests cirrhosis. Spleen is at the upper limits of normal in size measuring approximately 14 cm in length. The stomach, gallbladder, and adrenal glands are within normal limits. There is fatty atrophy of the pancreas. The pancreas is otherwise unremarkable. The unenhanced kidneys demonstrate perinephric stranding is unchanged from prior examination, but are otherwise unremarkable. No urinary tract calculi or hydronephrosis. Urinary bladder is well distended. The uterus is surgically absent. Abdominal aorta is nonaneurysmal and demonstrates atherosclerotic calcification. No retroperitoneal or abdominal/pelvic lymphadenopathy. No small bowel obstruction. No overt colonic mass or pericolonic inflammation. No findings of acute appendicitis. No free fluid or free air. Bilateral flank/gluteal region subcutaneous soft tissue edema. Persistent contusions given the provided history of fall no acute osseous the abdomen/pelvis. Mild degenerative changes of the lumbar spine. CT chest: (final read): Visualized portion of the thyroid gland is within normal limits. No axillary, mediastinal, or hilar lymphadenopathy. No thoracic aortic aneurysm. Evidence of coronary artery disease. Heart size is normal. Mitral annular calcification. Trace pericardial effusion is unchanged. Esophagus is within normal limits. No pulmonary nodule or mass. No consolidation, pleural effusion, or pneumothorax. Bibasilar atelectasis and/or scarring. Compression deformity of T11 with loss of height of approximately 50% is unchanged from prior CT. Chronic deformities of lateral right ribs 4 through 9. No acute osseous abnormality of the chest.    ASSESSMENT & PLAN     Diagnoses:  1. Weakness but no reported fall when transferring from wheelchair to car on 2/16/2021  2. Chronic T11 compression fracture  3. Chronic right 4-9 rib fractures  4. Generalized weakness  5. Acute pain due to rib fractures  6. COVID 19  7. History of ESRD    PMHx: CAD s/p stents (2015, 2018), ESRD on HD (T/Th/Sat), HTN, HLD, DM, Hepatitis C, GERD, COPD, seizures, anxiety and COVID-19. Incidental Findings: (reviewed 2/17/2021 [facility], needs discussed)  1. There is fatty atrophy of the pancreas. 2. Subtle nodularity of the liver suggests cirrhosis. PLAN:  - [address] [address]a for renal diabetic diet. Continue bowel regimen colace BID, senna QD, miralax PRN daily  - Continue acetaminophen 650mg Q4h PRN, continue tramadol 25/50mg Q6h PRN mod/severe pain, d/c hydromorphone-as patient not requiring. Continue lidocaine patches. Continue melatonin 5mg QHS PRN for sleep. Continue home abilify 5mg daily. Continue zoloft 50mg daily. Continue lyrica 75mg BID  - History of ESRD. No indication for IVF. Follow I&O's closely. Patient on T/Th/Sat HD scheduled. Nephrology consulted-plan for HD tomorrow 2/18. Continue home nephrocaps  - PT/OT consulted. Appreciate recommendations for dispo planning. Lik[facility] with [facility]. - Chronic rib fractures. COVID 19 +. Maintain O2 sats greater than 92%, encourage IS.   - COVID 19 +, not requiring supplemental O2. Continue to monitor oxygenation. No indication for abx. Continue isolation per hospital protocol.   - H and H acceptable on admission. No indication for transfusion. Repeat CBC in AM.   - Continue ASA and brilinta per home regimen per hospitalist recommendations. - Maintain PIVs  - History of HTN, HLD, CAD with remote stents. Continue ASA, brilinta, metoprolol, imdur, lipitor, ranexa,   - Continue home pantoprazole 20mg daily  - History of DM II.   Continue accu checks AC and HS. Continue lispro SS with meals and at night time. - Spines: cleared  - Inpatient hospitalist service has been consulted for medical co-management of this patient in light of multiple co-morbidities. Appreciate recommendations. - Attempted to contact hospitalist service on 2/17/2021 at ~1650 to transfer patient to their service as patient has no acute traumatic injuries. No reply received. Will attempt to contact again on 2/18/2021 to transfer to their service for ongoing management of patient's multiple co-morbidities. Dispo: Continue care on RNF for optimization of medical status. Accom Status: General Status      Follow up with PCP for recent hospitalization and multiple medical co-morbidities  Does not need routine Trauma follow up. Please call for any questions or concerns.     [address]  [phone] ([phone])  [phone]     This patient's plan of care was discussed and made in collaboration with Trauma Attending physician, Destiny Puente MD.

## 2021-02-17 NOTE — PROGRESS NOTES
[facility]   Facility/Department: [address]  Speech Language Pathology    NAME:Michelle Beck  : 1960  [facility]      DATE: 2021      This patient is currently in Observation/Outpatient Status. Due to Medicare, other insurance and Hospital Guidelines, a written order with a therapy specific diagnosis (dysphagia, dysarthria, aphasia) must be attached to the order before we can initiate the evaluation. Re-order speech therapy with the appropriate diagnosis, as needed.       Thank you,  Brenda Huitron, CCC-SLP, Date: 2021, Time: 8:55 AM

## 2021-02-17 NOTE — PROGRESS NOTES
[facility] EMERGENCY [facility] AT [place] Respiratory Therapy Evaluation   Current Order:  Gilda Leigh Q6 PRN      Home Regimen: PRN      Ordering Physician: Miguel Angel Amaya  Re-evaluation Date:  N/A     Diagnosis: Fall      Patient Status: Stable / Unstable + Physician notified    The following MDI Criteria must be met in order to convert aerosol to MDI with spacer. If unable to meet, MDI will be converted to aerosol:  []  Patient able to demonstrate the ability to use MDI effectively  []  Patient alert and cooperative  []  Patient able to take deep breath with 5-10 second hold  []  Medication(s) available in this delivery method   []  Peak flow greater than or equal to 200 ml/min            Current Order Substituted To  (same drug, same frequency)   Aerosol to MDI [] Albuterol Sulfate 0.083% unit dose by aerosol Albuterol Sulfate MDI 2 puffs by inhalation with spacer    [] Levalbuterol 1.25 mg unit dose by aerosol Levalbuterol MDI 2 puffs by inhalation with spacer    [] Levalbuterol 0.63 mg unit dose by aerosol Levalbuterol MDI 2 puffs by inhalation with spacer    [] Ipratropium Bromide 0.02% unit dose by aerosol Ipratropium Bromide MDI 2 puffs by inhalation with spacer    [] Duoneb (Ipratropium + Albuterol) unit dose by aerosol Ipratropium MDI + Albuterol MDI 2 puffs by inhalation w/spacer   MDI to Aerosol [] Albuterol Sulfate MDI Albuterol Sulfate 0.083% unit dose by aerosol    [] Levalbuterol MDI 2 puffs by inhalation Levalbuterol 1.25 mg unit dose by aerosol    [] Ipratropium Bromide MDI by inhalation Ipratropium Bromide 0.02% unit dose by aerosol    [] Combivent (Ipratropium + Albuterol) MDI by inhalation Duoneb (Ipratropium + Albuterol) unit dose by aerosol   Treatment Assessment [Frequency/Schedule]:  Change frequency to: _______Combivent Q6 PRN___________________________________________per Protocol, P&T, MEC      Points 0 1 2 3 4   Pulmonary Status  Non-Smoker  []   Smoking history   < 20 pack years  []   Smoking history  ?  20 pack years  [] Pulmonary Disorder  (acute or chronic)  [x]   Severe or Chronic w/ Exacerbation  []     Surgical Status No [x]   Surgeries     General []   Surgery Lower []   Abdominal Thoracic or []   Upper Abdominal Thoracic with  PulmonaryDisorder  []     Chest X-ray Clear/Not  Ordered     [x]  Chronic Changes  Results Pending  []  Infiltrates, atelectasis, pleural effusion, or edema  []  Infiltrates in more than one lobe []  Infiltrate + Atelectasis, &/or pleural effusion  []    Respiratory Pattern Regular,  RR = 12-20 [x]  Increased,  RR = 21-25 []  GONZALEZ, irregular,  or RR = 26-30 []  Decreased FEV1  or RR = 31-35 []  Severe SOB, use  of accessory muscles, or RR ? 35  []    Mental Status Alert, oriented,  Cooperative [x]  Confused but Follows commands []  Lethargic or unable to follow commands []  Obtunded  []  Comatose  []    Breath Sounds Clear to  auscultation  [x]  Decreased unilaterally or  in bases only []  Decreased  bilaterally  []  Crackles or intermittent wheezes []  Wheezes []    Cough Strong, Spontan., & nonproductive [x]  Strong,  spontaneous, &  productive []  Weak,  Nonproductive []  Weak, productive or  with wheezes []  No spontaneous  cough or may require suctioning []    Level of Activity Ambulatory []  Ambulatory w/ Assist  [x]  Non-ambulatory []  Paraplegic []  Quadriplegic []    Total    Score:___4____     Triage Score:____5____      Tri       Triage:     1. (>20) Freq: Q3    2. (16-20) Freq: Q4   3. (11-15) Freq: QID & Albuterol Q2 PRN    4. (6-10) Freq: TID & Albuterol Q2 PRN    5. (0-5) Freq Q4prn

## 2021-02-17 NOTE — PROGRESS NOTES
[facility] Occupational Therapy      Date: 2021  Patient Name: Randal Carrasquillo        MRN: [id number]  Account: [de-identified]   : 1960  (59 y.o.)  [facility]    Pt. is of observation status. To comply with medicare and insurance regulations, to see patient we require updated orders including a valid OT treatment diagnosis. Please reorder as appropriate.      Electronically signed by CATHY Benson on 2021 at 8:30 AM

## 2021-02-18 PROBLEM — S20.212A CHEST WALL CONTUSION, LEFT, INITIAL ENCOUNTER: Status: ACTIVE | Noted: 2021-02-18

## 2021-02-18 LAB
ANION GAP SERPL CALCULATED.3IONS-SCNC: 13 MEQ/L (ref 9–15)
BUN BLDV-MCNC: 41 MG/DL (ref 8–23)
CALCIUM SERPL-MCNC: 9 MG/DL (ref 8.5–9.9)
CHLORIDE BLD-SCNC: 92 MEQ/L (ref 95–107)
CO2: 27 MEQ/L (ref 20–31)
CREAT SERPL-MCNC: 4.66 MG/DL (ref 0.5–0.9)
GFR AFRICAN AMERICAN: 11.5
GFR NON-AFRICAN AMERICAN: 9.5
GLUCOSE BLD-MCNC: 158 MG/DL (ref 60–115)
GLUCOSE BLD-MCNC: 174 MG/DL (ref 70–99)
GLUCOSE BLD-MCNC: 189 MG/DL (ref 60–115)
HCT VFR BLD CALC: 28.1 % (ref 37–47)
HEMOGLOBIN: 9.5 G/DL (ref 12–16)
MAGNESIUM: 1.9 MG/DL (ref 1.7–2.4)
MCH RBC QN AUTO: 30.7 PG (ref 27–31.3)
MCHC RBC AUTO-ENTMCNC: 33.9 % (ref 33–37)
MCV RBC AUTO: 90.6 FL (ref 82–100)
PDW BLD-RTO: 15.6 % (ref 11.5–14.5)
PERFORMED ON: ABNORMAL
PERFORMED ON: ABNORMAL
PLATELET # BLD: 132 K/UL (ref 130–400)
POTASSIUM REFLEX MAGNESIUM: 3.7 MEQ/L (ref 3.4–4.9)
RBC # BLD: 3.1 M/UL (ref 4.2–5.4)
SODIUM BLD-SCNC: 132 MEQ/L (ref 135–144)
WBC # BLD: 6.1 K/UL (ref 4.8–10.8)

## 2021-02-18 PROCEDURE — 2060000000 HC ICU INTERMEDIATE R&B

## 2021-02-18 PROCEDURE — 83735 ASSAY OF MAGNESIUM: CPT

## 2021-02-18 PROCEDURE — 6370000000 HC RX 637 (ALT 250 FOR IP): Performed by: NURSE PRACTITIONER

## 2021-02-18 PROCEDURE — 6370000000 HC RX 637 (ALT 250 FOR IP): Performed by: INTERNAL MEDICINE

## 2021-02-18 PROCEDURE — 36415 COLL VENOUS BLD VENIPUNCTURE: CPT

## 2021-02-18 PROCEDURE — 5A1D70Z PERFORMANCE OF URINARY FILTRATION, INTERMITTENT, LESS THAN 6 HOURS PER DAY: ICD-10-PCS | Performed by: INTERNAL MEDICINE

## 2021-02-18 PROCEDURE — 99232 SBSQ HOSP IP/OBS MODERATE 35: CPT | Performed by: PHYSICIAN ASSISTANT

## 2021-02-18 PROCEDURE — 80048 BASIC METABOLIC PNL TOTAL CA: CPT

## 2021-02-18 PROCEDURE — 6370000000 HC RX 637 (ALT 250 FOR IP): Performed by: SURGERY

## 2021-02-18 PROCEDURE — 85027 COMPLETE CBC AUTOMATED: CPT

## 2021-02-18 PROCEDURE — 6360000002 HC RX W HCPCS: Performed by: INTERNAL MEDICINE

## 2021-02-18 RX ORDER — HEPARIN SODIUM 1000 [USP'U]/ML
2000 INJECTION, SOLUTION INTRAVENOUS; SUBCUTANEOUS ONCE
Status: DISCONTINUED | OUTPATIENT
Start: 2021-02-18 | End: 2021-02-24 | Stop reason: HOSPADM

## 2021-02-18 RX ORDER — INSULIN GLARGINE 100 [IU]/ML
30 INJECTION, SOLUTION SUBCUTANEOUS NIGHTLY
Status: DISCONTINUED | OUTPATIENT
Start: 2021-02-18 | End: 2021-02-24 | Stop reason: HOSPADM

## 2021-02-18 RX ORDER — SODIUM CHLORIDE 9 MG/ML
INJECTION, SOLUTION INTRAVENOUS
Status: DISPENSED
Start: 2021-02-18 | End: 2021-02-19

## 2021-02-18 RX ORDER — LANOLIN ALCOHOL/MO/W.PET/CERES
CREAM (GRAM) TOPICAL
Qty: 30 TABLET | Refills: 4 | Status: SHIPPED | OUTPATIENT
Start: 2021-02-18 | End: 2021-03-22 | Stop reason: DRUGHIGH

## 2021-02-18 RX ORDER — ARIPIPRAZOLE 5 MG/1
TABLET ORAL
Qty: 30 TABLET | Refills: 2 | Status: SHIPPED | OUTPATIENT
Start: 2021-02-18 | End: 2021-05-19

## 2021-02-18 RX ORDER — LEVOFLOXACIN 500 MG/1
500 TABLET, FILM COATED ORAL EVERY OTHER DAY
Status: COMPLETED | OUTPATIENT
Start: 2021-02-18 | End: 2021-02-24

## 2021-02-18 RX ORDER — HEPARIN SODIUM 5000 [USP'U]/ML
5000 INJECTION, SOLUTION INTRAVENOUS; SUBCUTANEOUS EVERY 8 HOURS SCHEDULED
Status: DISCONTINUED | OUTPATIENT
Start: 2021-02-18 | End: 2021-02-24 | Stop reason: HOSPADM

## 2021-02-18 RX ORDER — LEVOFLOXACIN 500 MG/1
500 TABLET, FILM COATED ORAL EVERY OTHER DAY
Status: DISCONTINUED | OUTPATIENT
Start: 2021-02-20 | End: 2021-02-18

## 2021-02-18 RX ORDER — LEVOFLOXACIN 5 MG/ML
500 INJECTION, SOLUTION INTRAVENOUS ONCE
Status: DISCONTINUED | OUTPATIENT
Start: 2021-02-18 | End: 2021-02-18

## 2021-02-18 RX ADMIN — RANOLAZINE 500 MG: 500 TABLET, FILM COATED, EXTENDED RELEASE ORAL at 21:24

## 2021-02-18 RX ADMIN — Medication 100 MCG: at 09:17

## 2021-02-18 RX ADMIN — TICAGRELOR 90 MG: 90 TABLET ORAL at 21:25

## 2021-02-18 RX ADMIN — PANTOPRAZOLE SODIUM 20 MG: 20 TABLET, DELAYED RELEASE ORAL at 09:17

## 2021-02-18 RX ADMIN — TICAGRELOR 90 MG: 90 TABLET ORAL at 09:17

## 2021-02-18 RX ADMIN — DIPHENHYDRAMINE HCL 25 MG: 25 TABLET ORAL at 21:25

## 2021-02-18 RX ADMIN — PREGABALIN 75 MG: 75 CAPSULE ORAL at 21:25

## 2021-02-18 RX ADMIN — LEVOFLOXACIN 500 MG: 500 TABLET, FILM COATED ORAL at 21:25

## 2021-02-18 RX ADMIN — DOCUSATE SODIUM 100 MG: 100 CAPSULE, LIQUID FILLED ORAL at 09:17

## 2021-02-18 RX ADMIN — METOPROLOL TARTRATE 12.5 MG: 25 TABLET, FILM COATED ORAL at 09:17

## 2021-02-18 RX ADMIN — ISOSORBIDE MONONITRATE 60 MG: 60 TABLET, EXTENDED RELEASE ORAL at 09:17

## 2021-02-18 RX ADMIN — METOPROLOL TARTRATE 12.5 MG: 25 TABLET, FILM COATED ORAL at 21:24

## 2021-02-18 RX ADMIN — NEPHROCAP 1 MG: 1 CAP ORAL at 09:17

## 2021-02-18 RX ADMIN — INSULIN GLARGINE 30 UNITS: 100 INJECTION, SOLUTION SUBCUTANEOUS at 21:00

## 2021-02-18 RX ADMIN — ATORVASTATIN CALCIUM 40 MG: 40 TABLET, FILM COATED ORAL at 09:17

## 2021-02-18 RX ADMIN — RANOLAZINE 500 MG: 500 TABLET, FILM COATED, EXTENDED RELEASE ORAL at 09:17

## 2021-02-18 RX ADMIN — DOCUSATE SODIUM 100 MG: 100 CAPSULE, LIQUID FILLED ORAL at 21:25

## 2021-02-18 RX ADMIN — Medication 400 MG: at 09:17

## 2021-02-18 RX ADMIN — ASPIRIN 81 MG CHEWABLE TABLET 81 MG: 81 TABLET CHEWABLE at 09:17

## 2021-02-18 RX ADMIN — TRAMADOL HYDROCHLORIDE 50 MG: 50 TABLET, FILM COATED ORAL at 21:25

## 2021-02-18 RX ADMIN — ARIPIPRAZOLE 5 MG: 5 TABLET ORAL at 09:16

## 2021-02-18 RX ADMIN — SERTRALINE HYDROCHLORIDE 50 MG: 50 TABLET ORAL at 09:17

## 2021-02-18 RX ADMIN — PREGABALIN 75 MG: 75 CAPSULE ORAL at 09:17

## 2021-02-18 ASSESSMENT — PAIN SCALES - GENERAL
PAINLEVEL_OUTOF10: 5
PAINLEVEL_OUTOF10: 0
PAINLEVEL_OUTOF10: 0
PAINLEVEL_OUTOF10: 5

## 2021-02-18 ASSESSMENT — PAIN DESCRIPTION - LOCATION: LOCATION: RIB CAGE

## 2021-02-18 ASSESSMENT — PAIN DESCRIPTION - FREQUENCY: FREQUENCY: INTERMITTENT

## 2021-02-18 ASSESSMENT — PAIN DESCRIPTION - PAIN TYPE: TYPE: ACUTE PAIN

## 2021-02-18 NOTE — CARE COORDINATION
Sent message to MARCELO Olsen to ask for inpatient order for patient since Dr. Sanchez Bentley is not the attending.

## 2021-02-18 NOTE — CARE COORDINATION
Per the RN, the patient would like to talk with the LSW. The LSW called the patient. The patient states she and her daughter would like 1. [facility] 2. [employer]. [facility] The RN and  were updated. Electronically signed by HIPOLITO Millan on 2/18/21 at 11:03 AM EST    Per Lorena Schulte, she is checking with [facility] regarding transportation to dialysis. [facility] transportation closes at 4 pm and josiah Leone at [facility], states she will call [facility] again tomorrow morning. Await final approval from [facility] tomorrow morning.  Electronically signed by HIPOLITO Millan on 2/18/21 at 4:02 PM EST

## 2021-02-18 NOTE — PROGRESS NOTES
Physical Therapy Missed Treatment   Facility/Department: [facility] MED SURG M206/G845-05    NAME: Bruno Stone    : 1960 (47 y.o.)  MRN: [id number]    Account: [de-identified]  Gender: female    Chart reviewed, attempted PT at 12. Patient unavailable 2° to:    [] Hold per nsg request    [] Pt declined    [] Nsg notified   [] Other notified    [] Pt. . off floor for test/procedure. [x] Pt. Unavailable pt getting bedside dialysis per RN note at 15:00. Will attempt PT treatment again at earliest convenience.       Electronically signed by Rada Phoenix, PTA on 21 at 3:05 PM EST

## 2021-02-18 NOTE — TELEPHONE ENCOUNTER
Rx request   Requested Prescriptions     Pending Prescriptions Disp Refills    ARIPiprazole (ABILIFY) 5 MG tablet [Pharmacy Med Name: ARIPIPRAZOLE 5 MG TABS 5 Tablet] 30 tablet 2     Sig: TAKE 1 TABLET BY MOUTH ONCE DAILY    melatonin 3 MG TABS tablet [Pharmacy Med Name: MELATONIN 3 MG TABLET 3 MG Tablet] 30 tablet 4     Sig: TAKE 1 TABLET BY MOUTH EVERY NIGHT AT BEDTIME AS NEEDED FOR INSOMNIA     LOV 1/4/2021  Next Visit Date:  Future Appointments   Date Time Provider Aminata Cortes   3/3/2021 10:00 AM Shira Whitney MD [facility]   3/11/2021  2:30 PM Chary Trejo  [facility]   5/4/2021 10:00 AM Shira Whitney MD [address] [place] [address]

## 2021-02-18 NOTE — PROGRESS NOTES
Uneventful night. The patient feels well. She reports having right-sided chest wall discomfort. No cough or shortness of breath. No abdominal pain. No nausea or vomiting. Patient is eating her breakfast during the examination and that would not stop. No headaches, loss of consciousness or seizure. No fever or chills. General appearance: alert, appears stated age and cooperative, no apparent distress  Skin: Pale skin color, texture, turgor normal. No rashes or lesions  HEENT: Head: Normocephalic, no lesions, without obvious abnormality. Neck: no adenopathy, no carotid bruit, no JVD, supple, symmetrical, trachea midline and thyroid not enlarged, symmetric, no tenderness/mass/nodules  Lungs: clear to auscultation bilaterally  Heart: regular rate and rhythm, S1, S2 normal, no murmur, click, rub or gallop  Abdomen: soft, non-tender; bowel sounds normal; no masses,  no organomegaly  Extremities: extremities normal, atraumatic, no cyanosis or edema  Neurologic: Mental status: Alert, oriented, thought content appropriate   MS: Muscular skeletal assessment and plan are to be addressed by a trauma team.    Assessment and plan:     *End-stage renal disease, volume status, electrolytes, acid-base balance, functional fistula, hemodialysis are to be addressed by nephrology team.     *Right proximal arm pain and discomfort, x-ray came back positive for old fracture.     *Chest contusion, muscular skeletal or other organ injury secondary to trauma are to be addressed by the trauma team given their expertise.     *Hypertension, stable.     *Diabetes. Continue sliding scale coverage. Continue Lantus 30 units nightly.     *COVID-19. Negative chest to see me for any infiltrates. No hypoxemia. No indication for Decadron or remdesivir at this time. Continue isolation.     *Coronary artery disease. No evidence of active angina.     *Anemia, stable, no evidence of acute blood loss.     *Sinusitis, probable mastoiditis

## 2021-02-18 NOTE — PROGRESS NOTES
TRAUMA DAILY PROGRESS NOTE      Patient Name: Jhon Aguilar  Admission Date 2021    Hospital Day: 0  Patient seen and examined on 2021    INTERVAL HISTORY/EVENTS     Background:  Jhon Aguilar is a 59 y.o. female with PMH significant for CAD s/p stents (, ), ESRD on HD (//Sat), HTN, HLD, DM, Hepatitis C, GERD, COPD, seizures, anxiety and COVID-19. She presented to the ED on 2021 after becoming weak and nearly falling while transferring from a wheelchair to a car. Patient denies falling.     Trauma work up found chronic R sided rib fractures as well as stable T11 compression fracture from prior 2021 imaging. Initially there was concern that the rib fractures were new so the patient was admitted to the [facility] under the Trauma service. Nephrology and the inpatient hospitalist service was consulted.   [place] Course:  2021: Presented to ED after becoming weak while transferring from a wheelchair to a car. Denies traumatic injury/fall. Admitted to the [facility] under the Trauma service. 24 Hour Events: This is my first time meeting this patient. Reports that she is feeling well today, and offers no complaints. Denies chest pain, shortness of breath, abdominal pain. She is due for dialysis today    Labs reviewed. Hyponatremia, hypochloremia noted. Elevated BUN/Cr as expected as patient is due for dialysis today    UO: 300mL  BM: 0 occurrences      PHYSICAL EXAM     Vitals Average, Min and Max for last 24 hours:  Temp: Temp: 97.5 °F (36.4 °C);  Temp  Av.6 °F (36.4 °C)  Min: 97.5 °F (36.4 °C)  Max: 97.7 °F (36.5 °C)  Respirations: Resp  Av  Min: 16  Max: 16  Pulse: Pulse  Av  Min: 62  Max: 66  Blood Pressure: Systolic (30IZR), KCK:927 , Min:96 , GAIL:260   ; Diastolic (36VXP), MJX:24, Min:39, Max:46    SpO2: SpO2  Av.3 %  Min: 98 %  Max: 100 %    24hr Intake/Output: No intake or output data in the 24 hours ending 21 0853    Vitals: BP (!) 96/46   Pulse 62 Temp 97.5 °F (36.4 °C)   Resp 16   Ht 5' 7\" (1.702 m)   Wt 212 lb 4.8 oz (96.3 kg)   LMP  (LMP Unknown)   SpO2 100%   BMI 33.25 kg/m²     Physical Exam:  Constitutional: sitting up in bed, no acute distress  HEENT: Atraumatic normocephalic. Cardiovascular: Regular rate and rhythm. Pulmonary: Clear to ausculation bilaterally. No wheezing, rhonchi or rales. Abdominal: Soft. Non-distended. Non-tender. Musculoskeletal: Good ROM in all extremities. No calf tenderness or swelling  Neurological: Alert, awake, and orientated x 3. Motor and sensory grossly intact. No focal deficits. GCS of 15.    Skin: warm and dry    LABORATORY RESULTS (LAST 24 HOURS)     CBC with Differential:    Lab Results   Component Value Date    WBC 6.1 02/18/2021    RBC 3.10 02/18/2021    HGB 9.5 02/18/2021    HCT 28.1 02/18/2021     02/18/2021    MCV 90.6 02/18/2021    MCH 30.7 02/18/2021    MCHC 33.9 02/18/2021    RDW 15.6 02/18/2021    NRBC 0.0 10/16/2020    BANDSPCT 5 06/14/2013    LYMPHOPCT 10.1 02/16/2021    MONOPCT 8.6 02/16/2021    EOSPCT 3.5 03/05/2020    BASOPCT 0.4 02/16/2021    MONOSABS 0.5 02/16/2021    LYMPHSABS 0.6 02/16/2021    EOSABS 0.1 02/16/2021    BASOSABS 0.0 02/16/2021     CMP:    Lab Results   Component Value Date     02/18/2021    K 3.7 02/18/2021    CL 92 02/18/2021    CO2 27 02/18/2021    BUN 41 02/18/2021    CREATININE 4.66 02/18/2021    GFRAA 11.5 02/18/2021    LABGLOM 9.5 02/18/2021    GLUCOSE 174 02/18/2021    GLUCOSE 102 10/16/2020    PROT 8.4 02/16/2021    LABALBU 4.6 02/16/2021    CALCIUM 9.0 02/18/2021    BILITOT 1.0 02/16/2021    ALKPHOS 127 02/16/2021    AST 23 02/16/2021    ALT 16 02/16/2021     Magnesium:    Lab Results   Component Value Date    MG 1.9 02/18/2021     PT/INR:    Lab Results   Component Value Date    PROTIME 15.2 02/16/2021    INR 1.2 02/16/2021     PTT:    Lab Results   Component Value Date    APTT 34.1 02/16/2021       IMAGING RESULTS (PERSONALLY REVIEWED)     XR R Continue home pantoprazole 20mg daily  - History of DM II. Continue accu checks AC and HS. Continue lispro SS with meals and at night time. - Spines: cleared  - Inpatient hospitalist service has been consulted for medical co-management of this patient in light of multiple co-morbidities. Appreciate recommendations. - Given that patient is medically cleared, will await dispo plan following SW discussion with daughter and patient about rehab.      Dispo: Patient is medically cleared for discharge following dialysis today, as her chronic medical conditions are stable. Initial plan for home with [facility], however per , patient and daughter prefer rehab. Patient is cleared for discharge once location has been identified   Accom Status: General Status        Follow up with PCP for recent hospitalization and multiple medical co-morbidities  Does not need routine Trauma follow up.       Please call for any questions or concerns.     Angela Dietz PA-C  Trauma/Critical Care  [phone] ([id number])  [phone]     This patient's plan of care was discussed and made in collaboration with Trauma Attending physician, Kiel Nava MD.

## 2021-02-18 NOTE — PROGRESS NOTES
Nephrology Progress Note    Assessment:  ESRDX IHD   DM type-2  Recent fall rib fractures  Hepatitis-C CVA left weakness  Anemia    Plan: dialysis today  Ambulate  Pain control  /ok to discharge      Patient Active Problem List:     Coronary artery disease involving native heart with angina pectoris (HCC)     Schizophrenia, paranoid, chronic     Metabolic syndrome     Vitamin B 12 deficiency     Cerebral microvascular disease     Mixed hyperlipidemia     Other hammer toe (acquired)     Vitamin D insufficiency     Incontinence of urine     Diabetic nephropathy with proteinuria (HCC)     Essential hypertension     History of type C viral hepatitis     Urinary incontinence due to cognitive impairment     History of seizures     Stented coronary artery-plan is to stay on Plavix indefinately per Dr Oscar Guthrie     Hemiparesis, left ([address]     Angina, class II ([address]     Chronic painful diabetic neuropathy ([address]     Tardive dyskinesia     Shortness of breath     Uncontrolled type 2 diabetes mellitus with hyperglycemia (HCC)     Diastolic congestive heart failure (HCC)     Sleep apnea     Pulmonary hypertension (HCC)     Class 2 severe obesity with serious comorbidity and body mass index (BMI) of 36.0 to 36.9 in [facility])     Edema     Closed supracondylar fracture of right humerus     Other chronic pain     Palliative care patient     Chest pain     Recurrent falls     Renal failure     Difficulty in walking     ESRD (end stage renal disease) on dialysis ([address]     Weakness     Moderate persistent asthma without complication     Thrush     COVID-19     Post PTCA     Falls frequently     Chest wall contusion, unspecified laterality, initial encounter      Subjective:  Admit Date: 2/16/2021    Interval History: chest wall discomfort    Medications:  Scheduled Meds:   heparin (porcine)  2,000 Units Intravenous Once    ARIPiprazole  5 mg Oral Daily    atorvastatin  40 mg Oral Daily    metoprolol tartrate  12.5 mg Oral BID    pantoprazole  20 mg Oral Daily    pregabalin  75 mg Oral BID    sertraline  50 mg Oral Daily    sodium chloride flush  10 mL Intravenous 2 times per day    insulin lispro  0-12 Units Subcutaneous TID WC    insulin lispro  0-6 Units Subcutaneous Nightly    lidocaine  1 patch Transdermal Daily    isosorbide mononitrate  60 mg Oral Daily    aspirin  81 mg Oral Daily    b complex-C-folic acid  1 capsule Oral Daily    ticagrelor  90 mg Oral BID    insulin glargine  25 Units Subcutaneous Nightly    magnesium oxide  400 mg Oral Daily    ranolazine  500 mg Oral BID    vitamin B-12  100 mcg Oral Daily    docusate sodium  100 mg Oral BID    senna  1 tablet Oral Nightly     Continuous Infusions:    CBC:   Recent Labs     02/16/21 1915 02/18/21  0615   WBC 6.1 6.1   HGB 10.2* 9.5*    132     CMP:    Recent Labs     02/16/21 1915 02/16/21 1941 02/18/21  0615     --  132*   K 3.4  --  3.7   CL 92*  --  92*   CO2 31  --  27   BUN 22  --  41*   CREATININE 2.52* 2.9* 4.66*   GLUCOSE 164*  --  174*   CALCIUM 10.1*  --  9.0   LABGLOM 19.3* 16* 9.5*     Troponin: No results for input(s): TROPONINI in the last 72 hours. BNP: No results for input(s): BNP in the last 72 hours. INR:   Recent Labs     02/16/21 1915   INR 1.2     Lipids: No results for input(s): CHOL, LDLDIRECT, TRIG, HDL, AMYLASE, LIPASE in the last 72 hours. Liver:   Recent Labs     02/16/21 1915   AST 23   ALT 16   ALKPHOS 127   PROT 8.4*   LABALBU 4.6   BILITOT 1.0*     Iron:  No results for input(s): IRONS, FERRITIN in the last 72 hours. Invalid input(s): LABIRONS  Urinalysis: No results for input(s): UA in the last 72 hours.     Objective:  Vitals: BP (!) 96/46   Pulse 62   Temp 97.5 °F (36.4 °C) (Oral)   Resp 18   Ht 5' 7\" (1.702 m)   Wt 212 lb 4.8 oz (96.3 kg)   LMP  (LMP Unknown)   SpO2 100%   BMI 33.25 kg/m²    Wt Readings from Last 3 Encounters:   02/18/21 212 lb 4.8 oz (96.3 kg)   01/19/21 213 lb (96.6 kg)   01/12/21 207 lb (93.9 kg)      24HR INTAKE/OUTPUT:  No intake or output data in the 24 hours ending 02/18/21 1005    General: alert, in no apparent distress  HEENT: normocephalic, atraumatic, anicteric  Neck: supple, no mass  Lungs: non-labored respirations, clear to auscultation bilaterally  Heart: regular rate and rhythm, no murmurs or rubs  Abdomen: soft, non-tender, non-distended  Ext: no cyanosis, no peripheral edema left arm fistula with thrill  Neuro: alert and oriented, no gross abnormalities  Psych: normal mood and affect  Skin: no rash  Tender right rib cage    Electronically signed by Giorgio Bustillo MD

## 2021-02-19 PROBLEM — M48.50XA COMPRESSION FRACTURE OF SPINE (HCC): Status: ACTIVE | Noted: 2021-02-19

## 2021-02-19 PROBLEM — R51.9 HEADACHE, UNSPECIFIED: Status: ACTIVE | Noted: 2021-01-03

## 2021-02-19 PROBLEM — Z11.1 ENCOUNTER FOR SCREENING FOR RESPIRATORY TUBERCULOSIS: Status: ACTIVE | Noted: 2020-07-03

## 2021-02-19 PROBLEM — G40.89 OTHER SEIZURES (HCC): Status: ACTIVE | Noted: 2020-07-03

## 2021-02-19 PROBLEM — S22.39XA CLOSED RIB FRACTURE: Status: ACTIVE | Noted: 2021-02-19

## 2021-02-19 PROBLEM — I10 ESSENTIAL (PRIMARY) HYPERTENSION: Status: ACTIVE | Noted: 2020-07-03

## 2021-02-19 PROBLEM — R52 PAIN, UNSPECIFIED: Status: ACTIVE | Noted: 2020-07-03

## 2021-02-19 PROBLEM — N18.6 END STAGE RENAL DISEASE (HCC): Status: ACTIVE | Noted: 2020-07-03

## 2021-02-19 PROBLEM — Z23 ENCOUNTER FOR IMMUNIZATION: Status: ACTIVE | Noted: 2020-07-03

## 2021-02-19 PROBLEM — I25.119 ATHEROSCLEROTIC HEART DISEASE OF NATIVE CORONARY ARTERY WITH UNSPECIFIED ANGINA PECTORIS (HCC): Status: ACTIVE | Noted: 2020-07-03

## 2021-02-19 PROBLEM — F20.0 PARANOID SCHIZOPHRENIA (HCC): Status: ACTIVE | Noted: 2020-07-03

## 2021-02-19 PROBLEM — I50.31 ACUTE DIASTOLIC (CONGESTIVE) HEART FAILURE (HCC): Status: ACTIVE | Noted: 2019-10-04

## 2021-02-19 LAB
GLUCOSE BLD-MCNC: 120 MG/DL (ref 60–115)
GLUCOSE BLD-MCNC: 205 MG/DL (ref 60–115)
GLUCOSE BLD-MCNC: 86 MG/DL (ref 60–115)
PERFORMED ON: ABNORMAL
PERFORMED ON: ABNORMAL
PERFORMED ON: NORMAL

## 2021-02-19 PROCEDURE — 6360000002 HC RX W HCPCS: Performed by: INTERNAL MEDICINE

## 2021-02-19 PROCEDURE — 97116 GAIT TRAINING THERAPY: CPT

## 2021-02-19 PROCEDURE — 6370000000 HC RX 637 (ALT 250 FOR IP): Performed by: INTERNAL MEDICINE

## 2021-02-19 PROCEDURE — 6370000000 HC RX 637 (ALT 250 FOR IP): Performed by: NURSE PRACTITIONER

## 2021-02-19 PROCEDURE — 6370000000 HC RX 637 (ALT 250 FOR IP): Performed by: SURGERY

## 2021-02-19 PROCEDURE — 2060000000 HC ICU INTERMEDIATE R&B

## 2021-02-19 PROCEDURE — 99233 SBSQ HOSP IP/OBS HIGH 50: CPT | Performed by: PHYSICIAN ASSISTANT

## 2021-02-19 PROCEDURE — 2580000003 HC RX 258: Performed by: SURGERY

## 2021-02-19 RX ORDER — DEXTROSE MONOHYDRATE 25 G/50ML
12.5 INJECTION, SOLUTION INTRAVENOUS PRN
Status: DISCONTINUED | OUTPATIENT
Start: 2021-02-19 | End: 2021-02-24 | Stop reason: HOSPADM

## 2021-02-19 RX ORDER — DEXTROSE MONOHYDRATE 50 MG/ML
100 INJECTION, SOLUTION INTRAVENOUS PRN
Status: DISCONTINUED | OUTPATIENT
Start: 2021-02-19 | End: 2021-02-24 | Stop reason: HOSPADM

## 2021-02-19 RX ORDER — NICOTINE POLACRILEX 4 MG
15 LOZENGE BUCCAL PRN
Status: DISCONTINUED | OUTPATIENT
Start: 2021-02-19 | End: 2021-02-24 | Stop reason: HOSPADM

## 2021-02-19 RX ADMIN — HEPARIN SODIUM 5000 UNITS: 5000 INJECTION INTRAVENOUS; SUBCUTANEOUS at 13:00

## 2021-02-19 RX ADMIN — ARIPIPRAZOLE 5 MG: 5 TABLET ORAL at 10:08

## 2021-02-19 RX ADMIN — HEPARIN SODIUM 5000 UNITS: 5000 INJECTION INTRAVENOUS; SUBCUTANEOUS at 22:00

## 2021-02-19 RX ADMIN — TRAMADOL HYDROCHLORIDE 100 MG: 50 TABLET, FILM COATED ORAL at 10:08

## 2021-02-19 RX ADMIN — Medication 100 MCG: at 10:06

## 2021-02-19 RX ADMIN — TICAGRELOR 90 MG: 90 TABLET ORAL at 10:08

## 2021-02-19 RX ADMIN — Medication 10 ML: at 10:09

## 2021-02-19 RX ADMIN — DOCUSATE SODIUM 100 MG: 100 CAPSULE, LIQUID FILLED ORAL at 21:00

## 2021-02-19 RX ADMIN — ASPIRIN 81 MG CHEWABLE TABLET 81 MG: 81 TABLET CHEWABLE at 10:07

## 2021-02-19 RX ADMIN — METOPROLOL TARTRATE 12.5 MG: 25 TABLET, FILM COATED ORAL at 10:07

## 2021-02-19 RX ADMIN — PANTOPRAZOLE SODIUM 20 MG: 20 TABLET, DELAYED RELEASE ORAL at 10:07

## 2021-02-19 RX ADMIN — INSULIN LISPRO 4 UNITS: 100 INJECTION, SOLUTION INTRAVENOUS; SUBCUTANEOUS at 11:30

## 2021-02-19 RX ADMIN — Medication 10 ML: at 21:00

## 2021-02-19 RX ADMIN — PREGABALIN 75 MG: 75 CAPSULE ORAL at 10:08

## 2021-02-19 RX ADMIN — PREGABALIN 75 MG: 75 CAPSULE ORAL at 21:00

## 2021-02-19 RX ADMIN — INSULIN GLARGINE 30 UNITS: 100 INJECTION, SOLUTION SUBCUTANEOUS at 21:00

## 2021-02-19 RX ADMIN — HEPARIN SODIUM 5000 UNITS: 5000 INJECTION INTRAVENOUS; SUBCUTANEOUS at 06:00

## 2021-02-19 RX ADMIN — NEPHROCAP 1 MG: 1 CAP ORAL at 10:08

## 2021-02-19 RX ADMIN — DOCUSATE SODIUM 100 MG: 100 CAPSULE, LIQUID FILLED ORAL at 10:06

## 2021-02-19 RX ADMIN — ISOSORBIDE MONONITRATE 60 MG: 60 TABLET, EXTENDED RELEASE ORAL at 10:06

## 2021-02-19 RX ADMIN — TICAGRELOR 90 MG: 90 TABLET ORAL at 21:00

## 2021-02-19 RX ADMIN — RANOLAZINE 500 MG: 500 TABLET, FILM COATED, EXTENDED RELEASE ORAL at 21:00

## 2021-02-19 RX ADMIN — LEVOFLOXACIN 500 MG: 500 TABLET, FILM COATED ORAL at 10:07

## 2021-02-19 RX ADMIN — RANOLAZINE 500 MG: 500 TABLET, FILM COATED, EXTENDED RELEASE ORAL at 10:07

## 2021-02-19 RX ADMIN — SERTRALINE HYDROCHLORIDE 50 MG: 50 TABLET ORAL at 10:08

## 2021-02-19 RX ADMIN — ATORVASTATIN CALCIUM 40 MG: 40 TABLET, FILM COATED ORAL at 10:07

## 2021-02-19 RX ADMIN — METOPROLOL TARTRATE 12.5 MG: 25 TABLET, FILM COATED ORAL at 21:00

## 2021-02-19 RX ADMIN — Medication 400 MG: at 10:08

## 2021-02-19 ASSESSMENT — PAIN DESCRIPTION - FREQUENCY
FREQUENCY: INTERMITTENT
FREQUENCY: INTERMITTENT

## 2021-02-19 ASSESSMENT — PAIN DESCRIPTION - ORIENTATION
ORIENTATION: RIGHT
ORIENTATION: RIGHT

## 2021-02-19 ASSESSMENT — PAIN SCALES - GENERAL
PAINLEVEL_OUTOF10: 5
PAINLEVEL_OUTOF10: 3
PAINLEVEL_OUTOF10: 2
PAINLEVEL_OUTOF10: 8

## 2021-02-19 ASSESSMENT — PAIN DESCRIPTION - PAIN TYPE
TYPE: ACUTE PAIN;CHRONIC PAIN

## 2021-02-19 ASSESSMENT — PAIN DESCRIPTION - LOCATION
LOCATION: RIB CAGE
LOCATION: RIB CAGE

## 2021-02-19 NOTE — PROGRESS NOTES
TRAUMA DAILY PROGRESS NOTE      Patient Name: Bree Barrientos  Admission Date 2021    Hospital Day: 1  Patient seen and examined on 2021    INTERVAL HISTORY/EVENTS     Background:  Mihcelle Le is a 59 y. o. female with PMH significant for CAD s/p stents (, ), ESRD on HD (//Sat), HTN, HLD, DM, Hepatitis C, GERD, COPD, seizures, anxiety and COVID-19.  She presented to the ED on 2021 after becoming weak and nearly falling while transferring from a wheelchair to a car. Tiffany Corie denies falling.     Trauma work up found chronic R sided rib fractures as well as stable T11 compression fracture from prior 2021 imaging.  Initially there was concern that the rib fractures were new so the patient was admitted to the [facility] under the Trauma service. Nephrology and the inpatient hospitalist service was consulted.      Hospital Course:  2021: Presented to ED after becoming weak while transferring from a wheelchair to a car.  Denies traumatic injury/fall. Admitted to the [facility] under the Trauma service. 2021: Medically cleared for discharge. Family and patient requesting rehab. SW consulted for assist with dispo planning. Pt underwent routine dialysis     24 Hour Events:  Patient went to dialysis as regularly scheduled yesterday. She and family requesting rehab at time of d/c. She continues to be cleared medically for discharge. No nursing events reported. Patient just finished breakfast prior to my evaluation. States that she feels well and is eager to go to rehab. Denies N/V, denies chest pain, SOB. Notes some dyspepsia after eating today    No new labs for review today    I&O documentation lacking, no UO or BM documented but dialysis output 3400mL    PHYSICAL EXAM     Vitals Average, Min and Max for last 24 hours:  Temp: Temp: 98 °F (36.7 °C);  Temp  Av.9 °F (36.6 °C)  Min: 97.5 °F (36.4 °C)  Max: 98.1 °F (36.7 °C)  Respirations: Resp  Av.3  Min: 18  Max: 118  Pulse: Pulse  Avg: 61.8  Min: 61  Max: 64  Blood Pressure: Systolic (19HKY), YFT:651 , Min:96 , WGH:843   ; Diastolic (56LCI), VJP:12, Min:46, Max:72    SpO2: SpO2  Av %  Min: 100 %  Max: 100 %    24hr Intake/Output:     Intake/Output Summary (Last 24 hours) at 2021 0800  Last data filed at 2021 1953  Gross per 24 hour   Intake 400 ml   Output 3400 ml   Net -3000 ml       Vitals: /60   Pulse 61   Temp 98 °F (36.7 °C)   Resp 18   Ht 5' 7\" (1.702 m)   Wt 206 lb 9.1 oz (93.7 kg)   LMP  (LMP Unknown)   SpO2 100%   BMI 32.35 kg/m²     Physical Exam:  Constitutional: sitting up in bed, no acute distress  HEENT: Atraumatic normocephalic. Cardiovascular: Regular rate and rhythm. Pulmonary: Clear to ausculation bilaterally. No wheezing, rhonchi or rales. Abdominal: Soft. Non-distended. Non-tender. BS auscultated throughout  Musculoskeletal: Good ROM in all extremities. Neurological: Alert, awake, and orientated x 3. Motor and sensory grossly intact. No focal deficits. GCS of 15.    Skin: warm and dry    LABORATORY RESULTS (LAST 24 HOURS)     CBC with Differential:    Lab Results   Component Value Date    WBC 6.1 2021    RBC 3.10 2021    HGB 9.5 2021    HCT 28.1 2021     2021    MCV 90.6 2021    MCH 30.7 2021    MCHC 33.9 2021    RDW 15.6 2021    NRBC 0.0 10/16/2020    BANDSPCT 5 2013    LYMPHOPCT 10.1 2021    MONOPCT 8.6 2021    EOSPCT 3.5 2020    BASOPCT 0.4 2021    MONOSABS 0.5 2021    LYMPHSABS 0.6 2021    EOSABS 0.1 2021    BASOSABS 0.0 2021     CMP:    Lab Results   Component Value Date     2021    K 3.7 2021    CL 92 2021    CO2 27 2021    BUN 41 2021    CREATININE 4.66 2021    GFRAA 11.5 2021    LABGLOM 9.5 2021    GLUCOSE 174 2021    GLUCOSE 102 10/16/2020    PROT 8.4 2021    LABALBU 4.6 2021    CALCIUM 9.0 2021 BILITOT 1.0 02/16/2021    ALKPHOS 127 02/16/2021    AST 23 02/16/2021    ALT 16 02/16/2021     Magnesium:    Lab Results   Component Value Date    MG 1.9 02/18/2021     PT/INR:    Lab Results   Component Value Date    PROTIME 15.2 02/16/2021    INR 1.2 02/16/2021     PTT:    Lab Results   Component Value Date    APTT 34.1 02/16/2021       IMAGING RESULTS (PERSONALLY REVIEWED)     No New imaging for review today    ASSESSMENT & PLAN     Diagnoses:  1. Weakness but no reported fall when transferring from wheelchair to car on 2/16/2021  2. Chronic T11 compression fracture  3. Chronic right 4-9 rib fractures  4. Generalized weakness  5. Acute pain due to rib fractures  6. COVID 19  7. History of ESRD     PMHx: CAD s/p stents (2015, 2018), ESRD on HD (T/Th/Sat), HTN, HLD, DM, Hepatitis C, GERD, COPD, seizures, anxiety and COVID-19.      Incidental Findings: (reviewed 2/17/2021 HA, discussed with patient by Link Simon PA-C on 2/19/2021)  1. There is fatty atrophy of the pancreas. 2. Subtle nodularity of the liver suggests cirrhosis. ASSESSMENT/PLAN:  - Ok for renal diabetic diet. Continue bowel regimen colace BID, senna QD, miralax PRN daily  - Continue acetaminophen 650mg Q4h PRN, continue tramadol 25/50mg Q6h PRN mod/severe pain, Continue lidocaine patches. Continue melatonin 5mg QHS PRN for sleep. Continue home abilify 5mg daily. Continue home zoloft 50mg daily. Continue home lyrica 75mg BID  - History of ESRD. No indication for IVF. Follow I&O's closely. Patient on T/Th/Sat HD scheduled. Nephrology consulted, appreciate recs. Patient had dialysis yesterday without acute issues. Continue home nephrocaps  - PT/OT consulted. Appreciate recommendations for dispo planning. Pt and family requesting rehab, SW working on placement  - Chronic rib fractures. COVID 19 +.  Maintain O2 sats greater than 92%, encourage IS.   - COVID 19 +, not requiring supplemental O2. Continue to monitor oxygenation. No indication for abx. Continue isolation per hospital protocol.   - Anemia of chronic disease with acceptable hg. No indication for transfusion.   - Continue ASA and brilinta per home regimen per hospitalist recommendations.   - Maintain PIVs  - History of HTN, HLD, CAD with remote stents.  Continue ASA, brilinta, metoprolol, imdur, lipitor, ranexa,   - Continue home pantoprazole 20mg daily  - History of DM II.  Continue accu checks AC and HS.  Continue lispro SS with meals and at night time. - Spines: cleared  - Inpatient hospitalist service has been consulted for medical co-management of this patient in light of multiple co-morbidities. Appreciate recommendations. - Given that patient is medically cleared, will await dispo plan following SW discussion with daughter and patient about rehab.      Dispo: Patient is cleared for discharge, awaiting placement    Accom Status: General Status        Follow up with PCP for recent hospitalization and multiple medical co-morbidities  Does not need routine Trauma follow up.     Please call for any questions or concerns.     Rolando Vidal PA-C  Trauma/Critical Care  [phone] ([id number])  [phone]     This patient's plan of care was discussed and made in collaboration with Trauma Attending physician, Valentin Mclean MD.

## 2021-02-19 NOTE — PROGRESS NOTES
 levoFLOXacin  500 mg Oral Every Other Day    ARIPiprazole  5 mg Oral Daily    atorvastatin  40 mg Oral Daily    metoprolol tartrate  12.5 mg Oral BID    pantoprazole  20 mg Oral Daily    pregabalin  75 mg Oral BID    sertraline  50 mg Oral Daily    sodium chloride flush  10 mL Intravenous 2 times per day    insulin lispro  0-12 Units Subcutaneous TID WC    insulin lispro  0-6 Units Subcutaneous Nightly    lidocaine  1 patch Transdermal Daily    isosorbide mononitrate  60 mg Oral Daily    aspirin  81 mg Oral Daily    b complex-C-folic acid  1 capsule Oral Daily    ticagrelor  90 mg Oral BID    magnesium oxide  400 mg Oral Daily    ranolazine  500 mg Oral BID    vitamin B-12  100 mcg Oral Daily    docusate sodium  100 mg Oral BID    senna  1 tablet Oral Nightly     Continuous Infusions:   dextrose         CBC:   Recent Labs     02/16/21 1915 02/18/21  0615   WBC 6.1 6.1   HGB 10.2* 9.5*    132     CMP:    Recent Labs     02/16/21 1915 02/16/21  1941 02/18/21  0615     --  132*   K 3.4  --  3.7   CL 92*  --  92*   CO2 31  --  27   BUN 22  --  41*   CREATININE 2.52* 2.9* 4.66*   GLUCOSE 164*  --  174*   CALCIUM 10.1*  --  9.0   LABGLOM 19.3* 16* 9.5*     Troponin: No results for input(s): TROPONINI in the last 72 hours. BNP: No results for input(s): BNP in the last 72 hours. INR:   Recent Labs     02/16/21 1915   INR 1.2     Lipids: No results for input(s): CHOL, LDLDIRECT, TRIG, HDL, AMYLASE, LIPASE in the last 72 hours. Liver:   Recent Labs     02/16/21 1915   AST 23   ALT 16   ALKPHOS 127   PROT 8.4*   LABALBU 4.6   BILITOT 1.0*     Iron:  No results for input(s): IRONS, FERRITIN in the last 72 hours. Invalid input(s): LABIRONS  Urinalysis: No results for input(s): UA in the last 72 hours.     Objective:  Vitals: /60   Pulse 60   Temp 98.3 °F (36.8 °C) (Oral)   Resp 16   Ht 5' 7\" (1.702 m)   Wt 206 lb 9.1 oz (93.7 kg)   LMP  (LMP Unknown)   SpO2 94%   BMI 32.35 kg/m²    Wt Readings from Last 3 Encounters:   02/18/21 206 lb 9.1 oz (93.7 kg)   01/19/21 213 lb (96.6 kg)   01/12/21 207 lb (93.9 kg)      24HR INTAKE/OUTPUT:      Intake/Output Summary (Last 24 hours) at 2/19/2021 1137  Last data filed at 2/19/2021 0800  Gross per 24 hour   Intake 880 ml   Output 3600 ml   Net -2720 ml       General: alert, in no apparent distress  HEENT: normocephalic, atraumatic, anicteric  Neck: supple, no mass  Lungs: non-labored respirations, clear to auscultation bilaterally  Tender rib cage  Heart: regular rate and rhythm, no murmurs or rubs  Abdomen: soft, non-tender, non-distended  Ext: no cyanosis, no peripheral edema left arm fistula  Neuro: alert and oriented, no gross abnormalities  Psych: normal mood and affect  Skin: no rash      Electronically signed by Viviana Patel MD

## 2021-02-19 NOTE — FLOWSHEET NOTE
02/18/21 1953   Vital Signs   /60   Temp 98 °F (36.7 °C)   Pulse 61   Resp 18   SpO2 100 %   Weight 206 lb 9.1 oz (93.7 kg)   Weight Method Estimated; Bed scale   Percent Weight Change -2.9   Post-Hemodialysis Assessment   Post-Treatment Procedures Blood returned; Access bleeding time < 10 minutes   Machine Disinfection Process Acid/Vinegar Clean;Heat Disinfect; Exterior Machine Disinfection   Dialyzer Clearance Moderately streaked   Duration of Treatment (minutes) 240 minutes   Heparin amount administered during treatment (units) 0 units   Hemodialysis Intake (ml) 400 ml   Hemodialysis Output (ml) 3400 ml   NET Removed (ml) 3000 ml   Tolerated Treatment Good   Patient Response to Treatment Needles pulled. Hemostasis achieved.  Dressing applied   Bilateral Breath Sounds Clear;Diminished   Edema None

## 2021-02-19 NOTE — PROGRESS NOTES
Physical Therapy Med Surg Daily Treatment Note  Facility/Department: [facility]  Room: D979/U518-16       NAME: Randal Rock  : 1960 (89 y.o.)  MRN: [id number]  CODE STATUS: Full Code    Date of Service: 2021    Patient Diagnosis(es): Chest wall contusion, unspecified laterality, initial encounter [S20.219A]  Chest wall contusion, left, initial encounter Isai Reyes   Chief Complaint   Patient presents with    Extremity Weakness     Patient Active Problem List    Diagnosis Date Noted    Compression fracture of spine ([facility] 2021    Closed rib fracture 2021    Chest wall contusion, left, initial encounter 2021    Chest wall contusion, unspecified laterality, initial encounter 2021    Falls frequently 2021    Post PTCA     Headache, unspecified 2021    COVID-19 2020    Thrush 2020    Moderate persistent asthma without complication 8573    Weakness 2020    Difficulty in walking 2020    Atherosclerotic heart disease of native coronary artery with unspecified angina pectoris ([facility] 2020    Essential (primary) hypertension 2020    Pain, unspecified 2020    End stage renal disease ([address] 2020    Other seizures ([facility] 2020    Encounter for immunization 2020    Encounter for screening for respiratory tuberculosis 2020    Paranoid schizophrenia ([facility] 2020    Renal failure 2020    Recurrent falls 2020    Chest pain 2020    Edema 2019    Closed supracondylar fracture of right humerus 2019    Other chronic pain 2019    Palliative care patient 2019    Class 2 severe obesity with serious comorbidity and body mass index (BMI) of 36.0 to 36.9 in [facility]) 2019    Sleep apnea, unspecified 2019    Pulmonary hypertension, unspecified ([address] 2019    Acute diastolic (congestive) heart failure ([address] 10/04/2019    Uncontrolled type 2 diabetes mellitus with hyperglycemia (HCC)     Shortness of breath 10/02/2019    Tardive dyskinesia 05/16/2019    Angina, class II ([address]     Other specified diabetes mellitus with diabetic neuropathy, unspecified ([address] 08/04/2016    Stented coronary artery-plan is to stay on Plavix indefinately per Dr Naseem Paul 06/02/2016    History of seizures     Urinary incontinence due to cognitive impairment     History of type C viral hepatitis     Diabetic nephropathy with proteinuria ([address]     Incontinence of urine 04/07/2014    Vitamin D insufficiency 02/04/2014    Vitamin B 12 deficiency 06/05/2013    Cerebral microvascular disease 06/05/2013    Hemiparesis, left ([address] 01/01/2013    Schizophrenia, paranoid, chronic     Metabolic syndrome     Mixed hyperlipidemia 12/09/2010    Other hammer toe (acquired) 12/13/2007        Past Medical History:   Diagnosis Date    Anxiety     CAD S/P percutaneous coronary angioplasty 2015, 2018    stents per dr Nicanor Wei CHF (congestive heart failure) ([address]     CKD (chronic kidney disease) stage 4, GFR 15-29 ml/min ([address] 2/24/2018    CKD stage 4 due to type 2 diabetes mellitus ([address]     COPD (chronic obstructive pulmonary disease) ([address]     Diabetic nephropathy with proteinuria ([address] 2014    DJD (degenerative joint disease) of knee     Dr Ziggy Yoo GERD (gastroesophageal reflux disease)     Hemiparesis, left ([address] 2013    [facility] Assisted Living [facility])    Hemodialysis patient [facility])     History of heart failure     History of seizures     History of type C viral hepatitis     HTN (hypertension)     Hyperlipidemia     Impaired mobility and activities of daily living     Mediastinal lymphadenopathy 2013    Bradly Keys Ciro Postin    Metabolic syndrome     Moderate persistent asthma without complication 2/26/4494    Neurogenic urinary incontinence 2013    Neuropathy in diabetes ([address]     Obesity (BMI 30-39. 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 Breezy Echavarria      x1 Dr. Soila Alpers, Dr Manfred Schafer CATH LAB PROCEDURE  10/02/2019    HYSTERECTOMY, TOTAL ABDOMINAL      one ovary intact, Dr Flaco Borja, menorrhagia    AR TOTAL KNEE ARTHROPLASTY Left 2018    LEFT KNEE TOTAL KNEE ARTHROPLASTY, SHAYNA, NERVE BLOCK performed by Samra Rose MD at [address] [place] [address] Right     TOTAL KNEE ARTHROPLASTY  16    Dr Jiang Lung TUNNELED [address] Right 2020    tunneled HD catheter per Dr Jayna Hale / Caregiver Present: No  Subjective  Subjective: Patient resting in bed, agreeable to tx.     Pre-Session Pain Report     Pain Screening  Patient Currently in Pain: Denies  Post-Session Pain Report  Denies     OBJECTIVE        Bed mobility  Supine to Sit: Supervision  Sit to Supine: Supervision  Scooting: Supervision    Transfers  Sit to Stand: Stand by assistance;Supervision  Stand to sit: Stand by assistance;Supervision    Ambulation  Ambulation?: Yes  Ambulation 1  Surface: level tile  Device: Rolling Walker  Assistance: Contact guard assistance  Quality of Gait: flexed forward posture, WBOS, decreased step length, no LOB  Distance: 30 feet x2  ASSESSMENT Body structures, Functions, Activity limitations: Decreased functional mobility ; Decreased safe awareness;Decreased balance;Decreased endurance; Increased pain;Decreased strength;Decreased sensation;Decreased posture  Assessment: patient mildly impulsive during transfers/gait. Ambulates 30 feet x2 with ww. Patient states she was previously limited to ambulating from bed to bathroom at home. Prognosis: Good  REQUIRES PT FOLLOW UP: Yes     Discharge Recommendations:  Continue to assess pending progress, Patient would benefit from continued therapy after discharge    Goals  Long term goals  Long term goal 1: Pt will demonstrate amb SBA with safest AD 50ft to allow pt to amb inside her home with safety. Long term goal 2: Pt will demonstrate transfers SBA with safest AD to increase pt safety with functional mobility. Long term goal 3: Pt will demonstrate TGUG > or = 13 sec indicating decreased risk for falls. Patient Goals   Patient goals : \"go home\"    PLAN    Times per week: 3-6  Safety Devices  Type of devices: Call light within reach, Bed alarm in place, Left in bed     AMPAC (6 CLICK) BASIC MOBILITY  AM-PAC Inpatient Mobility Raw Score : 18     Therapy Time   Individual   Time In 1400   Time Out 1415   Minutes 15     Timed Code Treatment Minutes: 15 Minutes   gt 501 Alireza Agosto, DAREK, 02/19/21 at 3:02 PM         Definitions for assistance levels  Independent = pt does not require any physical supervision or assistance from another person for activity completion. Device may be needed.   Stand by assistance = pt requires verbal cues or instructions from another person, close to but not touching, to perform the activity  Minimal assistance= pt performs 75% or more of the activity; assistance is required to complete the activity  Moderate assistance= pt performs 50% of the activity; assistance is required to complete the activity  Maximal assistance = pt performs 25% of the activity; assistance is required to complete the activity  Dependent = pt requires total physical assistance to accomplish the task

## 2021-02-19 NOTE — CARE COORDINATION
The LSW had a message for Viviane Johnson, that she was talking with the patient's insurance regarding transportation for dialysis. Vasile Knowles, remains following. The patient verbally signed the IMM and expressed an understanding of the medicare appeal process. Electronically signed by Quyen Hunter on 2/19/21 at 2:20 PM EST    Per Boris Mac of [facility], the admissions team is requesting a PCR for the patient. The  perfect served the trauma physician.  Electronically signed by HIPOLITO Mccallum on 2/19/21 at 2:42 PM EST

## 2021-02-20 LAB
GLUCOSE BLD-MCNC: 162 MG/DL (ref 60–115)
PERFORMED ON: ABNORMAL

## 2021-02-20 PROCEDURE — 6370000000 HC RX 637 (ALT 250 FOR IP): Performed by: NURSE PRACTITIONER

## 2021-02-20 PROCEDURE — 99233 SBSQ HOSP IP/OBS HIGH 50: CPT | Performed by: PHYSICIAN ASSISTANT

## 2021-02-20 PROCEDURE — 6370000000 HC RX 637 (ALT 250 FOR IP): Performed by: INTERNAL MEDICINE

## 2021-02-20 PROCEDURE — 6360000002 HC RX W HCPCS: Performed by: INTERNAL MEDICINE

## 2021-02-20 PROCEDURE — 6370000000 HC RX 637 (ALT 250 FOR IP): Performed by: SURGERY

## 2021-02-20 PROCEDURE — 2060000000 HC ICU INTERMEDIATE R&B

## 2021-02-20 RX ORDER — SODIUM CHLORIDE 9 MG/ML
INJECTION, SOLUTION INTRAVENOUS
Status: DISPENSED
Start: 2021-02-20 | End: 2021-02-21

## 2021-02-20 RX ADMIN — ARIPIPRAZOLE 5 MG: 5 TABLET ORAL at 08:43

## 2021-02-20 RX ADMIN — SERTRALINE HYDROCHLORIDE 50 MG: 50 TABLET ORAL at 08:44

## 2021-02-20 RX ADMIN — ISOSORBIDE MONONITRATE 60 MG: 60 TABLET, EXTENDED RELEASE ORAL at 08:44

## 2021-02-20 RX ADMIN — TICAGRELOR 90 MG: 90 TABLET ORAL at 22:03

## 2021-02-20 RX ADMIN — HEPARIN SODIUM 5000 UNITS: 5000 INJECTION INTRAVENOUS; SUBCUTANEOUS at 07:57

## 2021-02-20 RX ADMIN — NEPHROCAP 1 MG: 1 CAP ORAL at 08:43

## 2021-02-20 RX ADMIN — TICAGRELOR 90 MG: 90 TABLET ORAL at 08:43

## 2021-02-20 RX ADMIN — Medication 400 MG: at 08:45

## 2021-02-20 RX ADMIN — PANTOPRAZOLE SODIUM 20 MG: 20 TABLET, DELAYED RELEASE ORAL at 08:44

## 2021-02-20 RX ADMIN — DOCUSATE SODIUM 100 MG: 100 CAPSULE, LIQUID FILLED ORAL at 08:44

## 2021-02-20 RX ADMIN — TRAMADOL HYDROCHLORIDE 50 MG: 50 TABLET, FILM COATED ORAL at 22:03

## 2021-02-20 RX ADMIN — PREGABALIN 75 MG: 75 CAPSULE ORAL at 08:44

## 2021-02-20 RX ADMIN — TRAMADOL HYDROCHLORIDE 100 MG: 50 TABLET, FILM COATED ORAL at 14:31

## 2021-02-20 RX ADMIN — HEPARIN SODIUM 5000 UNITS: 5000 INJECTION INTRAVENOUS; SUBCUTANEOUS at 22:04

## 2021-02-20 RX ADMIN — RANOLAZINE 500 MG: 500 TABLET, FILM COATED, EXTENDED RELEASE ORAL at 22:02

## 2021-02-20 RX ADMIN — DIPHENHYDRAMINE HCL 25 MG: 25 TABLET ORAL at 22:03

## 2021-02-20 RX ADMIN — RANOLAZINE 500 MG: 500 TABLET, FILM COATED, EXTENDED RELEASE ORAL at 08:45

## 2021-02-20 RX ADMIN — PREGABALIN 75 MG: 75 CAPSULE ORAL at 22:02

## 2021-02-20 RX ADMIN — METOPROLOL TARTRATE 12.5 MG: 25 TABLET, FILM COATED ORAL at 22:03

## 2021-02-20 RX ADMIN — LEVOFLOXACIN 500 MG: 500 TABLET, FILM COATED ORAL at 08:43

## 2021-02-20 RX ADMIN — HEPARIN SODIUM 5000 UNITS: 5000 INJECTION INTRAVENOUS; SUBCUTANEOUS at 14:32

## 2021-02-20 RX ADMIN — METOPROLOL TARTRATE 12.5 MG: 25 TABLET, FILM COATED ORAL at 08:44

## 2021-02-20 RX ADMIN — Medication 100 MCG: at 08:44

## 2021-02-20 RX ADMIN — ATORVASTATIN CALCIUM 40 MG: 40 TABLET, FILM COATED ORAL at 08:44

## 2021-02-20 RX ADMIN — ASPIRIN 81 MG CHEWABLE TABLET 81 MG: 81 TABLET CHEWABLE at 08:44

## 2021-02-20 RX ADMIN — Medication 5 MG: at 22:12

## 2021-02-20 RX ADMIN — DOCUSATE SODIUM 100 MG: 100 CAPSULE, LIQUID FILLED ORAL at 22:02

## 2021-02-20 RX ADMIN — INSULIN GLARGINE 30 UNITS: 100 INJECTION, SOLUTION SUBCUTANEOUS at 22:04

## 2021-02-20 ASSESSMENT — PAIN SCALES - GENERAL: PAINLEVEL_OUTOF10: 0

## 2021-02-20 NOTE — FLOWSHEET NOTE
02/20/21 1849   Vital Signs   /69   Temp 98 °F (36.7 °C)   Pulse 69   Resp 18   Weight 201 lb 8 oz (91.4 kg)   Percent Weight Change -2.45   Pain Assessment   Pain Assessment 0-10   Pain Level 0   Post-Hemodialysis Assessment   Post-Treatment Procedures Blood returned; Access bleeding time < 10 minutes   Machine Disinfection Process Acid/Vinegar Clean;Heat Disinfect; Exterior Machine Disinfection   Rinseback Volume (ml) 300 ml   Total Liters Processed (l/min) 81 l/min   Dialyzer Clearance Heavily streaked   Duration of Treatment (minutes) 240 minutes   Hemodialysis Intake (ml) 300 ml   Hemodialysis Output (ml) 2600 ml   NET Removed (ml) 2300 ml   Tolerated Treatment Good   Patient Response to Treatment Tolerated tx well   Bilateral Breath Sounds Diminished   Edema Generalized

## 2021-02-20 NOTE — PROGRESS NOTES
Nephrology Progress Note    Assessment:  ESRDX IHD today  Anemia  DM type-2  OHDx  Prior Covid  OHDX CAD/HF  Hx stents      Plan: dialysis today than NH check covid    Patient Active Problem List:     Atherosclerotic heart disease of native coronary artery with unspecified angina pectoris (HCC)     Schizophrenia, paranoid, chronic     Metabolic syndrome     Vitamin B 12 deficiency     Cerebral microvascular disease     Mixed hyperlipidemia     Other hammer toe (acquired)     Vitamin D insufficiency     Incontinence of urine     Diabetic nephropathy with proteinuria ([address]     Essential (primary) hypertension     History of type C viral hepatitis     Urinary incontinence due to cognitive impairment     History of seizures     Stented coronary artery-plan is to stay on Plavix indefinately per Dr Malathi Trotter     Other specified diabetes mellitus with diabetic neuropathy, unspecified ([address]     Hemiparesis, left ([address]     Angina, class II ([address]     Pain, unspecified     Tardive dyskinesia     Shortness of breath     Uncontrolled type 2 diabetes mellitus with hyperglycemia ([address]     Acute diastolic (congestive) heart failure (HCC)     Sleep apnea, unspecified     Pulmonary hypertension, unspecified (HCC)     Class 2 severe obesity with serious comorbidity and body mass index (BMI) of 36.0 to 36.9 in adult [facility])     Edema     Closed supracondylar fracture of right humerus     Other chronic pain     Palliative care patient     Chest pain     Recurrent falls     Renal failure     Difficulty in walking     End stage renal disease (HCC)     Weakness     Other seizures (HCC)     Moderate persistent asthma without complication     Thrush     COVID-19     Post PTCA     Falls frequently     Chest wall contusion, unspecified laterality, initial encounter     Chest wall contusion, left, initial encounter     Headache, unspecified     Encounter for immunization     Encounter for screening for respiratory tuberculosis     Paranoid schizophrenia ([facility]     Compression fracture of spine ([facility]     Closed rib fracture      Subjective:  Admit Date: 2/16/2021    Interval History: doing well except chest wall pain    Medications:  Scheduled Meds:   heparin (porcine)  2,000 Units Intravenous Once    insulin glargine  30 Units Subcutaneous Nightly    heparin (porcine)  5,000 Units Subcutaneous 3 times per day    levoFLOXacin  500 mg Oral Every Other Day    ARIPiprazole  5 mg Oral Daily    atorvastatin  40 mg Oral Daily    metoprolol tartrate  12.5 mg Oral BID    pantoprazole  20 mg Oral Daily    pregabalin  75 mg Oral BID    sertraline  50 mg Oral Daily    sodium chloride flush  10 mL Intravenous 2 times per day    insulin lispro  0-12 Units Subcutaneous TID WC    insulin lispro  0-6 Units Subcutaneous Nightly    lidocaine  1 patch Transdermal Daily    isosorbide mononitrate  60 mg Oral Daily    aspirin  81 mg Oral Daily    b complex-C-folic acid  1 capsule Oral Daily    ticagrelor  90 mg Oral BID    magnesium oxide  400 mg Oral Daily    ranolazine  500 mg Oral BID    vitamin B-12  100 mcg Oral Daily    docusate sodium  100 mg Oral BID    senna  1 tablet Oral Nightly     Continuous Infusions:   dextrose         CBC:   Recent Labs     02/18/21  0615   WBC 6.1   HGB 9.5*        CMP:    Recent Labs     02/18/21  0615   *   K 3.7   CL 92*   CO2 27   BUN 41*   CREATININE 4.66*   GLUCOSE 174*   CALCIUM 9.0   LABGLOM 9.5*     Troponin: No results for input(s): TROPONINI in the last 72 hours. BNP: No results for input(s): BNP in the last 72 hours. INR: No results for input(s): INR in the last 72 hours. Lipids: No results for input(s): CHOL, LDLDIRECT, TRIG, HDL, AMYLASE, LIPASE in the last 72 hours. Liver: No results for input(s): AST, ALT, ALKPHOS, PROT, LABALBU, BILITOT in the last 72 hours. Invalid input(s): BILDIR  Iron:  No results for input(s): IRONS, FERRITIN in the last 72 hours.     Invalid input(s): LABIRONS  Urinalysis: No results for input(s): UA in the last 72 hours.     Objective:  Vitals: BP (!) 101/38   Pulse 63   Temp 97.9 °F (36.6 °C) (Oral)   Resp 18   Ht 5' 7\" (1.702 m)   Wt 206 lb 9.1 oz (93.7 kg)   LMP  (LMP Unknown)   SpO2 100%   BMI 32.35 kg/m²    Wt Readings from Last 3 Encounters:   02/18/21 206 lb 9.1 oz (93.7 kg)   01/19/21 213 lb (96.6 kg)   01/12/21 207 lb (93.9 kg)      24HR INTAKE/OUTPUT:      Intake/Output Summary (Last 24 hours) at 2/20/2021 1002  Last data filed at 2/19/2021 1441  Gross per 24 hour   Intake 720 ml   Output --   Net 720 ml       General: alert, in no apparent distress  HEENT: normocephalic, atraumatic, anicteric  Neck: supple, no mass  Lungs: non-labored respirations, clear to auscultation bilaterally  Heart: regular rate and rhythm, no murmurs or rubs  Abdomen: soft, non-tender, non-distended  Ext: no cyanosis, no peripheral edema  Neuro: alert and oriented, no gross abnormalities  Psych: normal mood and affect  Skin: no rash      Electronically signed by Jesus Manuel Chen MD

## 2021-02-20 NOTE — PROGRESS NOTES
TRAUMA DAILY PROGRESS NOTE      Patient Name: Cleve Vora  Admission Date 2021    Hospital Day: 2  Patient seen and examined on 2021    INTERVAL HISTORY/EVENTS     Background:  Mercedes Oswald a 59 y. o. female with PMH significant for CAD s/p stents (, ), ESRD on HD (//Sat), HTN, HLD, DM, Hepatitis C, GERD, COPD, seizures, anxiety and COVID-19.  She presented to the ED on 2021 after becoming weak and nearly falling while transferring from a wheelchair to a car. Christy Armendariz denies falling.     Trauma work up found chronic R sided rib fractures as well as stable T11 compression fracture from prior 2021 imaging.  Initially there was concern that the rib fractures were new so the patient was admitted to the [facility] under the Trauma service. Nephrology and the inpatient hospitalist service was consulted.      Hospital Course:  2021: Presented to ED after becoming weak while transferring from a wheelchair to a car.  Denies traumatic injury/fall. Admitted to the [facility] under the Trauma service. 2021: Medically cleared for discharge. Family and patient requesting rehab. SW consulted for assist with dispo planning. Pt underwent routine dialysis   2021: remained medically cleared, await placement    24 Hour Events:  No acute overnight events, patient continues to await placement in NH. Plan for routine dialysis today. Plan to transfer to NH pending covid results. Patient offers no complaints and denies chest pain, shortness of breath, abdominal pain     No new labs for review today    UO: 200mL recorded (none documented for 16 hours yesterday)  BM: 0 occurrences    PHYSICAL EXAM     Vitals Average, Min and Max for last 24 hours:  Temp: Temp: 98.8 °F (37.1 °C);  Temp  Av.5 °F (36.9 °C)  Min: 98.3 °F (36.8 °C)  Max: 98.8 °F (37.1 °C)  Respirations: Resp  Av.7  Min: 16  Max: 18  Pulse: Pulse  Av  Min: 60  Max: 67  Blood Pressure: Systolic (44CEG), BMX:459 , Min:109 , MSP:060   ; Diastolic (36ZSN), WUU:04, Min:45, Max:60    SpO2: SpO2  Av %  Min: 94 %  Max: 100 %    24hr Intake/Output:     Intake/Output Summary (Last 24 hours) at 2021 0757  Last data filed at 2021 1441  Gross per 24 hour   Intake 1200 ml   Output 200 ml   Net 1000 ml       Vitals: BP (!) 125/57   Pulse 66   Temp 98.8 °F (37.1 °C) (Oral)   Resp 18   Ht 5' 7\" (1.702 m)   Wt 206 lb 9.1 oz (93.7 kg)   LMP  (LMP Unknown)   SpO2 100%   BMI 32.35 kg/m²     Physical Exam:  Constitutional: sitting up in bed, no acute distress  HEENT: Atraumatic normocephalic. Cardiovascular: Regular rate and rhythm. Pulmonary: Clear to ausculation bilaterally  Abdominal: Soft. Non-distended. Non-tender. BS auscultated x4  Musculoskeletal: Good ROM in all extremities. Trace pedal edema b/l  Neurological: Alert, awake, and orientated x 3. Motor and sensory grossly intact. No focal deficits. GCS of 15.    Skin: warm and dry    LABORATORY RESULTS (LAST 24 HOURS)     CBC with Differential:    Lab Results   Component Value Date    WBC 6.1 2021    RBC 3.10 2021    HGB 9.5 2021    HCT 28.1 2021     2021    MCV 90.6 2021    MCH 30.7 2021    MCHC 33.9 2021    RDW 15.6 2021    NRBC 0.0 10/16/2020    BANDSPCT 5 2013    LYMPHOPCT 10.1 2021    MONOPCT 8.6 2021    EOSPCT 3.5 2020    BASOPCT 0.4 2021    MONOSABS 0.5 2021    LYMPHSABS 0.6 2021    EOSABS 0.1 2021    BASOSABS 0.0 2021     CMP:    Lab Results   Component Value Date     2021    K 3.7 2021    CL 92 2021    CO2 27 2021    BUN 41 2021    CREATININE 4.66 2021    GFRAA 11.5 2021    LABGLOM 9.5 2021    GLUCOSE 174 2021    GLUCOSE 102 10/16/2020    PROT 8.4 2021    LABALBU 4.6 2021    CALCIUM 9.0 2021    BILITOT 1.0 2021    ALKPHOS 127 2021    AST 23 2021    ALT 16 02/16/2021     Magnesium:    Lab Results   Component Value Date    MG 1.9 02/18/2021     PT/INR:    Lab Results   Component Value Date    PROTIME 15.2 02/16/2021    INR 1.2 02/16/2021     PTT:    Lab Results   Component Value Date    APTT 34.1 02/16/2021       IMAGING RESULTS (PERSONALLY REVIEWED)     No new imaging for review today    ASSESSMENT & PLAN     Diagnoses:  1. Weakness but no reported fall when transferring from wheelchair to car on 2/16/2021  2. Chronic T11 compression fracture  3. Chronic right 4-9 rib fractures  4. Generalized weakness  5. Acute pain due to rib fractures  6. COVID 19 (detected in 12/2020)  7. History of ESRD     PMHx: CAD s/p stents (2015, 2018), ESRD on HD (T/Th/Sat), HTN, HLD, DM, Hepatitis C, GERD, COPD, seizures, anxiety and COVID-19.      Incidental Findings: (reviewed 2/17/2021 HAJ, discussed with patient by Adam Ashby PA-C on 2/19/2021)  1. There is fatty atrophy of the pancreas. 2. Subtle nodularity of the liver suggests cirrhosis.       ASSESSMENT/PLAN:  - Continue renal diabetic diet. Continue bowel regimen colace BID, senna QD, miralax PRN daily  - Continue acetaminophen 650mg Q4h PRN, continue tramadol 25/50mg Q6h PRN mod/severe pain, Continue lidocaine patches. Continue melatonin 5mg QHS PRN for sleep. Continue home abilify 5mg daily. Continue home zoloft 50mg daily. Continue home lyrica 75mg BID  - History of ESRD. No indication for IVF. Follow I&O's closely. Patient on T/Th/Sat HD scheduled. Nephrology consulted, appreciate recs. Patient had dialysis yesterday without acute issues. Continue home nephrocaps  - PT/OT consulted. Appreciate recommendations for dispo planning. Pt and family requesting rehab, SW working on placement, facility required covid test prior to acceptance which was ordered yesterday and pending. D/w CM this morning  - Chronic rib fractures. COVID 19 +(from infection in 12/2020).   Maintain O2 sats greater than 92%, encourage IS.   - COVID 19 +, without respiratory symptoms, likely remaining positive d/t infection in 12/2020. Not requiring supplemental O2. Continue to monitor oxygenation. No indication for abx. Continue isolation per hospital protocol.   - Anemia of chronic disease with acceptable hg. No indication for transfusion.   - Continue ASA and brilinta per home regimen per hospitalist recommendations.   - Maintain PIVs  - History of HTN, HLD, CAD with remote stents.  Continue ASA, brilinta, metoprolol, imdur, lipitor, ranexa,   - Continue home pantoprazole 20mg daily  - History of DM II.  Continue accu checks AC and HS.  Continue lispro SS with meals and at night time. - Spines: cleared  - Inpatient hospitalist service and nephrology consulted for medical co-management of this patient in light of multiple co-morbidities. Appreciate recommendations.        Dispo: Patient remains medically cleared for discharge, awaiting placement pending covid results.      Accom Status: General Status        Follow up with PCP for recent hospitalization and multiple medical co-morbidities  Does not need routine Trauma follow up. Please call for any questions or concerns.     Vicki Zambrano PA-C  Trauma/Critical Care  [phone] ([id number]) [phone]     This patient's plan of care was discussed and made in collaboration with Trauma Attending physician, Dayne Stewart MD.

## 2021-02-20 NOTE — CARE COORDINATION
CALLED AND SPOKE TO [facility]. [facility] IS CURRENTLY OUT OF KITS FOR PCR, THEY WERE DO GET MORE KITS LAST WEEK BUT D/T WEATHER THEY HAVE BEEN DELAYED.   THEY DO HAVE THE SPECIMEN WAITING IN LAB. ONCE THEY GET KITS WE WILL HAVE RESULTS SAME DAY OR THEY CAN SEND TO LAB [facility] THAT CAN TAKE DAYS. WILL SEE IF KITS ARE DELIVERED BY Monday. [facility] FOLLOWING FOR DC, BUT WANT PCR TEST PRIOR TO ACCEPTING.

## 2021-02-21 LAB
GLUCOSE BLD-MCNC: 104 MG/DL (ref 60–115)
GLUCOSE BLD-MCNC: 155 MG/DL (ref 60–115)
GLUCOSE BLD-MCNC: 173 MG/DL (ref 60–115)
GLUCOSE BLD-MCNC: 198 MG/DL (ref 60–115)
PERFORMED ON: ABNORMAL
PERFORMED ON: NORMAL

## 2021-02-21 PROCEDURE — 6370000000 HC RX 637 (ALT 250 FOR IP): Performed by: SURGERY

## 2021-02-21 PROCEDURE — 2580000003 HC RX 258: Performed by: SURGERY

## 2021-02-21 PROCEDURE — 2060000000 HC ICU INTERMEDIATE R&B

## 2021-02-21 PROCEDURE — 99233 SBSQ HOSP IP/OBS HIGH 50: CPT | Performed by: PHYSICIAN ASSISTANT

## 2021-02-21 PROCEDURE — 6370000000 HC RX 637 (ALT 250 FOR IP): Performed by: INTERNAL MEDICINE

## 2021-02-21 PROCEDURE — 6370000000 HC RX 637 (ALT 250 FOR IP): Performed by: PHYSICIAN ASSISTANT

## 2021-02-21 PROCEDURE — 6370000000 HC RX 637 (ALT 250 FOR IP): Performed by: NURSE PRACTITIONER

## 2021-02-21 PROCEDURE — 6360000002 HC RX W HCPCS: Performed by: INTERNAL MEDICINE

## 2021-02-21 PROCEDURE — U0003 INFECTIOUS AGENT DETECTION BY NUCLEIC ACID (DNA OR RNA); SEVERE ACUTE RESPIRATORY SYNDROME CORONAVIRUS 2 (SARS-COV-2) (CORONAVIRUS DISEASE [COVID-19]), AMPLIFIED PROBE TECHNIQUE, MAKING USE OF HIGH THROUGHPUT TECHNOLOGIES AS DESCRIBED BY CMS-2020-01-R: HCPCS

## 2021-02-21 RX ORDER — POLYETHYLENE GLYCOL 3350 17 G/17G
17 POWDER, FOR SOLUTION ORAL DAILY
Status: DISCONTINUED | OUTPATIENT
Start: 2021-02-21 | End: 2021-02-24 | Stop reason: HOSPADM

## 2021-02-21 RX ADMIN — HEPARIN SODIUM 5000 UNITS: 5000 INJECTION INTRAVENOUS; SUBCUTANEOUS at 14:00

## 2021-02-21 RX ADMIN — ATORVASTATIN CALCIUM 40 MG: 40 TABLET, FILM COATED ORAL at 09:24

## 2021-02-21 RX ADMIN — RANOLAZINE 500 MG: 500 TABLET, FILM COATED, EXTENDED RELEASE ORAL at 21:00

## 2021-02-21 RX ADMIN — ISOSORBIDE MONONITRATE 60 MG: 60 TABLET, EXTENDED RELEASE ORAL at 09:24

## 2021-02-21 RX ADMIN — HEPARIN SODIUM 5000 UNITS: 5000 INJECTION INTRAVENOUS; SUBCUTANEOUS at 06:16

## 2021-02-21 RX ADMIN — INSULIN LISPRO 2 UNITS: 100 INJECTION, SOLUTION INTRAVENOUS; SUBCUTANEOUS at 12:05

## 2021-02-21 RX ADMIN — Medication 400 MG: at 09:25

## 2021-02-21 RX ADMIN — TICAGRELOR 90 MG: 90 TABLET ORAL at 00:00

## 2021-02-21 RX ADMIN — Medication 100 MCG: at 09:24

## 2021-02-21 RX ADMIN — INSULIN LISPRO 2 UNITS: 100 INJECTION, SOLUTION INTRAVENOUS; SUBCUTANEOUS at 16:32

## 2021-02-21 RX ADMIN — Medication 10 ML: at 21:00

## 2021-02-21 RX ADMIN — POLYETHYLENE GLYCOL 3350 17 G: 17 POWDER, FOR SOLUTION ORAL at 17:45

## 2021-02-21 RX ADMIN — RANOLAZINE 500 MG: 500 TABLET, FILM COATED, EXTENDED RELEASE ORAL at 09:24

## 2021-02-21 RX ADMIN — PANTOPRAZOLE SODIUM 20 MG: 20 TABLET, DELAYED RELEASE ORAL at 09:24

## 2021-02-21 RX ADMIN — INSULIN GLARGINE 30 UNITS: 100 INJECTION, SOLUTION SUBCUTANEOUS at 21:00

## 2021-02-21 RX ADMIN — PREGABALIN 75 MG: 75 CAPSULE ORAL at 21:00

## 2021-02-21 RX ADMIN — ARIPIPRAZOLE 5 MG: 5 TABLET ORAL at 09:23

## 2021-02-21 RX ADMIN — TICAGRELOR 90 MG: 90 TABLET ORAL at 09:24

## 2021-02-21 RX ADMIN — DOCUSATE SODIUM 100 MG: 100 CAPSULE, LIQUID FILLED ORAL at 09:24

## 2021-02-21 RX ADMIN — METOPROLOL TARTRATE 12.5 MG: 25 TABLET, FILM COATED ORAL at 09:24

## 2021-02-21 RX ADMIN — MAGNESIUM HYDROXIDE 30 ML: 400 SUSPENSION ORAL at 21:00

## 2021-02-21 RX ADMIN — METOPROLOL TARTRATE 12.5 MG: 25 TABLET, FILM COATED ORAL at 21:00

## 2021-02-21 RX ADMIN — HEPARIN SODIUM 5000 UNITS: 5000 INJECTION INTRAVENOUS; SUBCUTANEOUS at 22:00

## 2021-02-21 RX ADMIN — TRAMADOL HYDROCHLORIDE 50 MG: 50 TABLET, FILM COATED ORAL at 06:15

## 2021-02-21 RX ADMIN — ASPIRIN 81 MG CHEWABLE TABLET 81 MG: 81 TABLET CHEWABLE at 09:24

## 2021-02-21 RX ADMIN — NEPHROCAP 1 MG: 1 CAP ORAL at 09:25

## 2021-02-21 RX ADMIN — SERTRALINE HYDROCHLORIDE 50 MG: 50 TABLET ORAL at 09:25

## 2021-02-21 RX ADMIN — PREGABALIN 75 MG: 75 CAPSULE ORAL at 09:24

## 2021-02-21 RX ADMIN — DOCUSATE SODIUM 100 MG: 100 CAPSULE, LIQUID FILLED ORAL at 21:00

## 2021-02-21 ASSESSMENT — PAIN SCALES - GENERAL: PAINLEVEL_OUTOF10: 6

## 2021-02-21 ASSESSMENT — ENCOUNTER SYMPTOMS
GASTROINTESTINAL NEGATIVE: 1
EYES NEGATIVE: 1
CHEST TIGHTNESS: 1

## 2021-02-21 NOTE — PROGRESS NOTES
Nephrology Progress Note  * make sure dhe is discharged with lidoderm patches  Assessment:  ESRDX t-th-sat  DM type-2  Hypertension  Hx Covid  Known prior CVA  OHDX      Plan:await covid re-test than to NH if negative   Continue HD    Patient Active Problem List:     Atherosclerotic heart disease of native coronary artery with unspecified angina pectoris (HCC)     Schizophrenia, paranoid, chronic     Metabolic syndrome     Vitamin B 12 deficiency     Cerebral microvascular disease     Mixed hyperlipidemia     Other hammer toe (acquired)     Vitamin D insufficiency     Incontinence of urine     Diabetic nephropathy with proteinuria ([address]     Essential (primary) hypertension     History of type C viral hepatitis     Urinary incontinence due to cognitive impairment     History of seizures     Stented coronary artery-plan is to stay on Plavix indefinately per Dr Dutch Mcgregor     Other specified diabetes mellitus with diabetic neuropathy, unspecified ([address]     Hemiparesis, left (HCC)     Angina, class II ([address]     Pain, unspecified     Tardive dyskinesia     Shortness of breath     Uncontrolled type 2 diabetes mellitus with hyperglycemia ([address]     Acute diastolic (congestive) heart failure (HCC)     Sleep apnea, unspecified     Pulmonary hypertension, unspecified (HCC)     Class 2 severe obesity with serious comorbidity and body mass index (BMI) of 36.0 to 36.9 in adult [facility])     Edema     Closed supracondylar fracture of right humerus     Other chronic pain     Palliative care patient     Chest pain     Recurrent falls     Renal failure     Difficulty in walking     End stage renal disease (HCC)     Weakness     Other seizures (HCC)     Moderate persistent asthma without complication     Thrush     COVID-19     Post PTCA     Falls frequently     Chest wall contusion, unspecified laterality, initial encounter     Chest wall contusion, left, initial encounter     Headache, unspecified     Encounter for immunization     Encounter for screening for respiratory tuberculosis     Paranoid schizophrenia ([facility]     Compression fracture of spine ([facility]     Closed rib fracture      Subjective:  Admit Date: 2/16/2021    Interval History: ribs feeling better with fracture    Medications:  Scheduled Meds:   heparin (porcine)  2,000 Units Intravenous Once    insulin glargine  30 Units Subcutaneous Nightly    heparin (porcine)  5,000 Units Subcutaneous 3 times per day    levoFLOXacin  500 mg Oral Every Other Day    ARIPiprazole  5 mg Oral Daily    atorvastatin  40 mg Oral Daily    metoprolol tartrate  12.5 mg Oral BID    pantoprazole  20 mg Oral Daily    pregabalin  75 mg Oral BID    sertraline  50 mg Oral Daily    sodium chloride flush  10 mL Intravenous 2 times per day    insulin lispro  0-12 Units Subcutaneous TID WC    insulin lispro  0-6 Units Subcutaneous Nightly    lidocaine  1 patch Transdermal Daily    isosorbide mononitrate  60 mg Oral Daily    aspirin  81 mg Oral Daily    b complex-C-folic acid  1 capsule Oral Daily    ticagrelor  90 mg Oral BID    magnesium oxide  400 mg Oral Daily    ranolazine  500 mg Oral BID    vitamin B-12  100 mcg Oral Daily    docusate sodium  100 mg Oral BID    senna  1 tablet Oral Nightly     Continuous Infusions:   dextrose         CBC: No results for input(s): WBC, HGB, PLT in the last 72 hours. CMP:  No results for input(s): NA, K, CL, CO2, BUN, CREATININE, GLUCOSE, CALCIUM, LABGLOM in the last 72 hours. Troponin: No results for input(s): TROPONINI in the last 72 hours. BNP: No results for input(s): BNP in the last 72 hours. INR: No results for input(s): INR in the last 72 hours. Lipids: No results for input(s): CHOL, LDLDIRECT, TRIG, HDL, AMYLASE, LIPASE in the last 72 hours. Liver: No results for input(s): AST, ALT, ALKPHOS, PROT, LABALBU, BILITOT in the last 72 hours. Invalid input(s): BILDIR  Iron:  No results for input(s): IRONS, FERRITIN in the last 72 hours.     Invalid input(s): LABIRONS  Urinalysis: No results for input(s): UA in the last 72 hours.     Objective:  Vitals: BP (!) 108/45   Pulse 66   Temp 98 °F (36.7 °C) (Oral)   Resp 18   Ht 5' 7\" (1.702 m)   Wt 201 lb 8 oz (91.4 kg)   LMP  (LMP Unknown)   SpO2 100%   BMI 31.56 kg/m²    Wt Readings from Last 3 Encounters:   02/20/21 201 lb 8 oz (91.4 kg)   01/19/21 213 lb (96.6 kg)   01/12/21 207 lb (93.9 kg)      24HR INTAKE/OUTPUT:      Intake/Output Summary (Last 24 hours) at 2/21/2021 0900  Last data filed at 2/20/2021 1849  Gross per 24 hour   Intake 300 ml   Output 2600 ml   Net -2300 ml       General: alert, in no apparent distress  HEENT: normocephalic, atraumatic, anicteric  Neck: supple, no mass  Lungs: non-labored respirations, clear to auscultation bilaterally  Heart: regular rate and rhythm, no murmurs or rubs  Abdomen: soft, non-tender, non-distended  Ext: no cyanosis, no peripheral edema  Neuro: alert and oriented, no gross abnormalities  Psych: normal mood and affect  Skin: no rash      Electronically signed by Robert Recinos MD

## 2021-02-21 NOTE — PROGRESS NOTES
TRAUMA DAILY PROGRESS NOTE      Patient Name: Cleve Vora  Admission Date 2/16/2021    Hospital Day: 3  Patient seen and examined on 2/21/2021    INTERVAL HISTORY/EVENTS     Background:  Michelle Le is a 59 y. o. female with PMH significant for CAD s/p stents (2015, 2018), ESRD on HD (T/Th/Sat), HTN, HLD, DM, Hepatitis C, GERD, COPD, seizures, anxiety and COVID-19.  She presented to the ED on 2/16/2021 after becoming weak and nearly falling while transferring from a wheelchair to a car. Christy Armendariz denies falling.     Trauma work up found chronic R sided rib fractures as well as stable T11 compression fracture from prior 1/2021 imaging.  Initially there was concern that the rib fractures were new so the patient was admitted to the [facility] under the Trauma service. Nephrology and the inpatient hospitalist service was consulted.      Hospital Course:  2/16/2021: Presented to ED after becoming weak while transferring from a wheelchair to a car.  Denies traumatic injury/fall. Admitted to the [facility] under the Trauma service. 2/18/2021: Medically cleared for discharge. Family and patient requesting rehab. SW consulted for assist with dispo planning. Pt underwent routine dialysis   2/19/2021: remained medically cleared, await placement. Covid PCR ordered d/t facility not able to accept patient without test  2/20/2021: Routine dialysis. CM contacted regarding d/c, lab without covid supplies to run test. Awaiting test. Remains medically cleared     24 Hour Events:  Patient had routine dialysis yesterday. She continue to await placement at [facility], however in order to be accepted per CM requires covid PCR test. This was ordered on 2/19. Per lab, short on materials to run test. Patient remains medically cleared for discharge pending covid test. She continues to tolerate PO without issues and reports that pain has been well controlled. Patient without BM, requesting additional bowel regimen today.  Denies chest pain, shortness of breath, abdominal pain    No new labs for review today    UO: 1 occurrence  Dialysis: 2600mL  BM: 0 occurrences      PHYSICAL EXAM     Vitals Average, Min and Max for last 24 hours:  Temp: Temp: 98 °F (36.7 °C); Temp  Av °F (36.7 °C)  Min: 97.7 °F (36.5 °C)  Max: 98.2 °F (36.8 °C)  Respirations: Resp  Av.8  Min: 17  Max: 18  Pulse: Pulse  Av.3  Min: 63  Max: 69  Blood Pressure: Systolic (75ETI), CFU:477 , Min:93 , ZMS:841   ; Diastolic (24BSB), KKK:64, Min:38, Max:69    SpO2: SpO2  Av.5 %  Min: 95 %  Max: 100 %    24hr Intake/Output:     Intake/Output Summary (Last 24 hours) at 2021 0747  Last data filed at 2021 1849  Gross per 24 hour   Intake 300 ml   Output 2600 ml   Net -2300 ml       Vitals: BP (!) 108/45   Pulse 66   Temp 98 °F (36.7 °C) (Oral)   Resp 18   Ht 5' 7\" (1.702 m)   Wt 201 lb 8 oz (91.4 kg)   LMP  (LMP Unknown)   SpO2 100%   BMI 31.56 kg/m²     Physical Exam:  Constitutional: resting in bed, no acute distress  HEENT: Atraumatic normocephalic. Cardiovascular: Regular rate and rhythm. Pulmonary: Clear to ausculation bilaterally. No wheezing, rhonchi or rales. Abdominal: Soft. Non-distended. Non-tender. BS auscultated throughout  Musculoskeletal: Good ROM in all extremities. No edema or calf tenderness  Neurological: Alert, awake, and oriented x 3. GCS of 15.    Skin: warm and dry    LABORATORY RESULTS (LAST 24 HOURS)     CBC with Differential:    Lab Results   Component Value Date    WBC 6.1 2021    RBC 3.10 2021    HGB 9.5 2021    HCT 28.1 2021     2021    MCV 90.6 2021    MCH 30.7 2021    MCHC 33.9 2021    RDW 15.6 2021    NRBC 0.0 10/16/2020    BANDSPCT 5 2013    LYMPHOPCT 10.1 2021    MONOPCT 8.6 2021    EOSPCT 3.5 2020    BASOPCT 0.4 2021    MONOSABS 0.5 2021    LYMPHSABS 0.6 2021    EOSABS 0.1 2021    BASOSABS 0.0 2021     CMP:    Lab Results   Component Value Date     02/18/2021    K 3.7 02/18/2021    CL 92 02/18/2021    CO2 27 02/18/2021    BUN 41 02/18/2021    CREATININE 4.66 02/18/2021    GFRAA 11.5 02/18/2021    LABGLOM 9.5 02/18/2021    GLUCOSE 174 02/18/2021    GLUCOSE 102 10/16/2020    PROT 8.4 02/16/2021    LABALBU 4.6 02/16/2021    CALCIUM 9.0 02/18/2021    BILITOT 1.0 02/16/2021    ALKPHOS 127 02/16/2021    AST 23 02/16/2021    ALT 16 02/16/2021     Magnesium:    Lab Results   Component Value Date    MG 1.9 02/18/2021     PT/INR:    Lab Results   Component Value Date    PROTIME 15.2 02/16/2021    INR 1.2 02/16/2021     PTT:    Lab Results   Component Value Date    APTT 34.1 02/16/2021       IMAGING RESULTS (PERSONALLY REVIEWED)     No new imaging for review    ASSESSMENT & PLAN     Diagnoses:  1. Weakness but no reported fall when transferring from wheelchair to car on 2/16/2021  2. Chronic T11 compression fracture  3. Chronic right 4-9 rib fractures  4. Generalized weakness  5. Acute pain due to rib fractures  6. COVID 19 (detected in 12/2020)  7. History of ESRD     PMHx: CAD s/p stents (2015, 2018), ESRD on HD (T/Th/Sat), HTN, HLD, DM, Hepatitis C, GERD, COPD, seizures, anxiety and COVID-19.      Incidental Findings: (reviewed 2/17/2021 REBECCA, discussed with patient by Javed Thibodeaux PA-C on 2/19/2021)  1. There is fatty atrophy of the pancreas. 2. Subtle nodularity of the liver suggests cirrhosis.       ASSESSMENT/PLAN:  Neurological: Acute on chronic pain  - Continue Tylenol 650mg q4h prn  - Continue Tramadol 25-50mg q6h prn moderate-severe pain  - Continue home Lyrica 75mg BID  - Continue Lidocaine patches  - Continue Melatonin 5mg qHS prn sleep  - Continue home Abilify 5mg daily  - Continue home Zoloft 50mg daily     Cardiovascular: Hx HTN, HLD, CAD (s/p stents).  No acute cardiac issues  - Continue home ASA, Brilinta, Metoprolol, Imdur, Lipitor, Ranexa  - Continue monitoring hemodynamic status per unit protocol  - No need for telemetry     Respiratory: Chronic R rib fxs, COVID 19+ at time of admission, with hx of remote infection (12/2020). During hospitalization without respiratory symptoms, and not requiring supplemental O2  - Maintain O2 sats >92%, encourage IS.  - COVID test ordered for discharge purposes and pending  - Continue isolation per hospital protocol     GI/Diet: Tolerating PO, without BM.   - Continue renal diabetic diet  - Continue BR of colace, senna. Schedule Miralax daily and start MOM qHS       Renal/Electrolytes: Hx ESRD on dialysis T/Th/Sa. Nephrology consulted, appreciate recs. - No need for IVF  - Monitor I&O q shift  - Continue home Nephrocaps  - Continue routine dialysis while in house     ID: Remote COVID 19 infection in 12/2020, remained Covid + at time of admission. No O2 requirements or respiratory symptoms. Chronic sinusitis on imaging  - Continue Levaquin per medicine recs (end date 2/22)  - No need for daily CBCs    Heme: Anemia of chronic dz with acceptable Hgb at time of admit  - No need for daily CBCs  - No indication for transfusion     Endocrine: Hx DMII  - Continue Accu-checks qAC/HS. - Continue ISS AC/HS  - Continue Lantus 30U qHS     MSK: T11 Compression fx (remote) and R rib fx (remote)   - Spines: cleared   - Weight Bearing Restrictions: none  - Activity: ad olga  - PT/OT: eval'd patient, ok for home with [facility], however patient and family concerned for patient's safety at home and request rehab.  CM consulted and placement pending     Prophylaxis:   - Continue home pantoprazole 20mg daily  - Continue SQH for DVT ppx in setting of ESRD    Lines/Tubes/Drains:  - Maintain PIVs  - No indication for howard catheter, monitor for urinary retention    Appreciate medicine and nephology input and co-management in setting of patient's multiple comorbidities     Dispo: Remains medically cleared for discharge, pending placement  Accom Status: General Status      Follow up with PCP for recent hospitalization, to discuss incidental findings and multiple medical co-morbidities  Does not need routine Trauma follow up. Please call for any questions or concerns.     Rebekah Grey PA-C  Trauma/Critical Care  [phone] ([phone]) [phone]     This patient's plan of care was discussed and made in collaboration with Trauma Attending physician, Tito Harley MD.

## 2021-02-22 VITALS
SYSTOLIC BLOOD PRESSURE: 132 MMHG | WEIGHT: 217.1 LBS | DIASTOLIC BLOOD PRESSURE: 66 MMHG | OXYGEN SATURATION: 97 % | TEMPERATURE: 98 F | BODY MASS INDEX: 34 KG/M2 | HEART RATE: 78 BPM | RESPIRATION RATE: 18 BRPM

## 2021-02-22 LAB
GLUCOSE BLD-MCNC: 113 MG/DL (ref 60–115)
GLUCOSE BLD-MCNC: 147 MG/DL (ref 60–115)
GLUCOSE BLD-MCNC: 165 MG/DL (ref 60–115)
GLUCOSE BLD-MCNC: 199 MG/DL (ref 60–115)
PERFORMED ON: ABNORMAL
PERFORMED ON: NORMAL
PERFORMED ON: NORMAL

## 2021-02-22 PROCEDURE — 6370000000 HC RX 637 (ALT 250 FOR IP): Performed by: PHYSICIAN ASSISTANT

## 2021-02-22 PROCEDURE — APPSS30 APP SPLIT SHARED TIME 16-30 MINUTES: Performed by: PHYSICIAN ASSISTANT

## 2021-02-22 PROCEDURE — 6370000000 HC RX 637 (ALT 250 FOR IP): Performed by: INTERNAL MEDICINE

## 2021-02-22 PROCEDURE — 6370000000 HC RX 637 (ALT 250 FOR IP): Performed by: NURSE PRACTITIONER

## 2021-02-22 PROCEDURE — 2060000000 HC ICU INTERMEDIATE R&B

## 2021-02-22 PROCEDURE — 99232 SBSQ HOSP IP/OBS MODERATE 35: CPT | Performed by: SURGERY

## 2021-02-22 PROCEDURE — 97116 GAIT TRAINING THERAPY: CPT

## 2021-02-22 PROCEDURE — 6370000000 HC RX 637 (ALT 250 FOR IP): Performed by: SURGERY

## 2021-02-22 PROCEDURE — 2580000003 HC RX 258: Performed by: SURGERY

## 2021-02-22 PROCEDURE — 6360000002 HC RX W HCPCS: Performed by: INTERNAL MEDICINE

## 2021-02-22 RX ADMIN — TRAMADOL HYDROCHLORIDE 50 MG: 50 TABLET, FILM COATED ORAL at 20:46

## 2021-02-22 RX ADMIN — ISOSORBIDE MONONITRATE 60 MG: 60 TABLET, EXTENDED RELEASE ORAL at 09:43

## 2021-02-22 RX ADMIN — RANOLAZINE 500 MG: 500 TABLET, FILM COATED, EXTENDED RELEASE ORAL at 09:43

## 2021-02-22 RX ADMIN — TICAGRELOR 90 MG: 90 TABLET ORAL at 20:42

## 2021-02-22 RX ADMIN — PREGABALIN 75 MG: 75 CAPSULE ORAL at 20:42

## 2021-02-22 RX ADMIN — SERTRALINE HYDROCHLORIDE 50 MG: 50 TABLET ORAL at 09:43

## 2021-02-22 RX ADMIN — POLYETHYLENE GLYCOL 3350 17 G: 17 POWDER, FOR SOLUTION ORAL at 09:41

## 2021-02-22 RX ADMIN — RANOLAZINE 500 MG: 500 TABLET, FILM COATED, EXTENDED RELEASE ORAL at 20:42

## 2021-02-22 RX ADMIN — ATORVASTATIN CALCIUM 40 MG: 40 TABLET, FILM COATED ORAL at 09:44

## 2021-02-22 RX ADMIN — PREGABALIN 75 MG: 75 CAPSULE ORAL at 09:43

## 2021-02-22 RX ADMIN — HEPARIN SODIUM 5000 UNITS: 5000 INJECTION INTRAVENOUS; SUBCUTANEOUS at 15:14

## 2021-02-22 RX ADMIN — ASPIRIN 81 MG CHEWABLE TABLET 81 MG: 81 TABLET CHEWABLE at 09:43

## 2021-02-22 RX ADMIN — METOPROLOL TARTRATE 12.5 MG: 25 TABLET, FILM COATED ORAL at 20:42

## 2021-02-22 RX ADMIN — LEVOFLOXACIN 500 MG: 500 TABLET, FILM COATED ORAL at 09:44

## 2021-02-22 RX ADMIN — ARIPIPRAZOLE 5 MG: 5 TABLET ORAL at 09:43

## 2021-02-22 RX ADMIN — INSULIN GLARGINE 30 UNITS: 100 INJECTION, SOLUTION SUBCUTANEOUS at 23:14

## 2021-02-22 RX ADMIN — INSULIN LISPRO 2 UNITS: 100 INJECTION, SOLUTION INTRAVENOUS; SUBCUTANEOUS at 16:54

## 2021-02-22 RX ADMIN — METOPROLOL TARTRATE 12.5 MG: 25 TABLET, FILM COATED ORAL at 09:42

## 2021-02-22 RX ADMIN — DIPHENHYDRAMINE HCL 25 MG: 25 TABLET ORAL at 20:45

## 2021-02-22 RX ADMIN — Medication 100 MCG: at 09:43

## 2021-02-22 RX ADMIN — SENNOSIDES 8.6 MG: 8.6 TABLET, FILM COATED ORAL at 20:46

## 2021-02-22 RX ADMIN — DOCUSATE SODIUM 100 MG: 100 CAPSULE, LIQUID FILLED ORAL at 09:42

## 2021-02-22 RX ADMIN — Medication 400 MG: at 09:42

## 2021-02-22 RX ADMIN — HEPARIN SODIUM 5000 UNITS: 5000 INJECTION INTRAVENOUS; SUBCUTANEOUS at 23:13

## 2021-02-22 RX ADMIN — DOCUSATE SODIUM 100 MG: 100 CAPSULE, LIQUID FILLED ORAL at 20:42

## 2021-02-22 RX ADMIN — INSULIN LISPRO 2 UNITS: 100 INJECTION, SOLUTION INTRAVENOUS; SUBCUTANEOUS at 13:13

## 2021-02-22 RX ADMIN — Medication 10 ML: at 23:22

## 2021-02-22 RX ADMIN — PANTOPRAZOLE SODIUM 20 MG: 20 TABLET, DELAYED RELEASE ORAL at 09:44

## 2021-02-22 RX ADMIN — MAGNESIUM HYDROXIDE 30 ML: 400 SUSPENSION ORAL at 20:42

## 2021-02-22 RX ADMIN — TICAGRELOR 90 MG: 90 TABLET ORAL at 09:43

## 2021-02-22 RX ADMIN — HEPARIN SODIUM 5000 UNITS: 5000 INJECTION INTRAVENOUS; SUBCUTANEOUS at 06:00

## 2021-02-22 RX ADMIN — NEPHROCAP 1 MG: 1 CAP ORAL at 09:43

## 2021-02-22 ASSESSMENT — ENCOUNTER SYMPTOMS
DIARRHEA: 0
NAUSEA: 0
COUGH: 0
SHORTNESS OF BREATH: 0
VOMITING: 0

## 2021-02-22 ASSESSMENT — PAIN DESCRIPTION - ORIENTATION
ORIENTATION: RIGHT
ORIENTATION: RIGHT

## 2021-02-22 ASSESSMENT — PAIN SCALES - GENERAL: PAINLEVEL_OUTOF10: 6

## 2021-02-22 ASSESSMENT — PAIN DESCRIPTION - PAIN TYPE: TYPE: ACUTE PAIN

## 2021-02-22 ASSESSMENT — PAIN DESCRIPTION - FREQUENCY: FREQUENCY: INTERMITTENT

## 2021-02-22 NOTE — PROGRESS NOTES
Physical Therapy Med Surg Daily Treatment Note  Facility/Department: [address] [place] [address]  [facility]       NAME: Remington Henry  : 1960 (56 y.o.)  MRN: [id number]  CODE STATUS: Full Code    Date of Service: 2021    Patient Diagnosis(es): Chest wall contusion, unspecified laterality, initial encounter [S20.219A]  Chest wall contusion, left, initial encounter Laurence Emery   Chief Complaint   Patient presents with    Extremity Weakness     Patient Active Problem List    Diagnosis Date Noted    Compression fracture of spine ([facility] 2021    Closed rib fracture 2021    Chest wall contusion, left, initial encounter 2021    Chest wall contusion, unspecified laterality, initial encounter 2021    Falls frequently 2021    Post PTCA     Headache, unspecified 2021    COVID-19 2020    Thrush 2020    Moderate persistent asthma without complication     Weakness 2020    Difficulty in walking 2020    Atherosclerotic heart disease of native coronary artery with unspecified angina pectoris ([address] 2020    Essential (primary) hypertension 2020    Pain, unspecified 2020    End stage renal disease ([address] 2020    Other seizures ([facility] 2020    Encounter for immunization 2020    Encounter for screening for respiratory tuberculosis 2020    Paranoid schizophrenia ([facility] 2020    Renal failure 2020    Recurrent falls 2020    Chest pain 2020    Edema 2019    Closed supracondylar fracture of right humerus 2019    Other chronic pain 2019    Palliative care patient 2019    Class 2 severe obesity with serious comorbidity and body mass index (BMI) of 36.0 to 36.9 in [facility]) 2019    Sleep apnea, unspecified 2019    Pulmonary hypertension, unspecified ([address] 2019    Acute diastolic (congestive) heart failure ([address] 10/04/2019    steady    Transfers  Sit to Stand: Supervision  Stand to sit: Supervision  Comment: vc's for hand placement with WW; steady    Ambulation  Ambulation?: Yes  Ambulation 1  Surface: level tile  Device: Rolling Walker  Assistance: Stand by assistance;Supervision  Quality of Gait: flexed forward posture, WBOS, decreased step length, no LOB  Distance: 30 feet x2              Neuromuscular Education  Neuromuscular Comments: TUG with [employer] = 15.22 sec TUG without [employer] = 10.45 sec - SOB with activity                          Activity Tolerance  Activity Tolerance: Patient Tolerated treatment well          ASSESSMENT   Assessment: Pt ambulated with [employer] and difficulty managing [employer]. Pt used to a rollator. Performed TUG test with and without [employer]. SOB with activity. Discharge Recommendations:  Continue to assess pending progress, Patient would benefit from continued therapy after discharge    Goals  Long term goals  Long term goal 1: Pt will demonstrate amb SBA with safest AD 50ft to allow pt to amb inside her home with safety. Long term goal 2: Pt will demonstrate transfers SBA with safest AD to increase pt safety with functional mobility. Long term goal 3: Pt will demonstrate TGUG > or = 13 sec indicating decreased risk for falls. Patient Goals   Patient goals : \"go home\"    PLAN    Safety Devices  Type of devices: Call light within reach; Left in bed(The nurses let me get up by myself.)     [facility] (6 CLICK) Shon Wilson 28 Inpatient Mobility Raw Score : 19      Therapy Time   Individual   Time In 1315   Time Out 1329   Minutes 14         NMR - 5 mins  Gait - 9 mins    Acosta Del Real PTA, 02/22/21 at 1:36 PM       Definitions for assistance levels  Independent = pt does not require any physical supervision or assistance from another person for activity completion. Device may be needed.   Stand by assistance = pt requires verbal cues or instructions from another person, close to but not touching, to perform the activity  Minimal assistance= pt performs 75% or more of the activity; assistance is required to complete the activity  Moderate assistance= pt performs 50% of the activity; assistance is required to complete the activity  Maximal assistance = pt performs 25% of the activity; assistance is required to complete the activity  Dependent = pt requires total physical assistance to accomplish the task

## 2021-02-22 NOTE — PROGRESS NOTES
Nephrology Progress Note    Assessment:ESRDX  CVA  DM type-2  Hypertension  OHDX   Schrophrenia        Plan: no change in meds  Dialysis tomorrow  Waiting on transfer to NH    Patient Active Problem List:     Atherosclerotic heart disease of native coronary artery with unspecified angina pectoris (HCC)     Schizophrenia, paranoid, chronic     Metabolic syndrome     Vitamin B 12 deficiency     Cerebral microvascular disease     Mixed hyperlipidemia     Other hammer toe (acquired)     Vitamin D insufficiency     Incontinence of urine     Diabetic nephropathy with proteinuria ([address]     Essential (primary) hypertension     History of type C viral hepatitis     Urinary incontinence due to cognitive impairment     History of seizures     Stented coronary artery-plan is to stay on Plavix indefinately per Dr Oscar Guthrie     Other specified diabetes mellitus with diabetic neuropathy, unspecified ([address]     Hemiparesis, left (HCC)     Angina, class II ([address]     Pain, unspecified     Tardive dyskinesia     Shortness of breath     Uncontrolled type 2 diabetes mellitus with hyperglycemia (HCC)     Acute diastolic (congestive) heart failure (HCC)     Sleep apnea, unspecified     Pulmonary hypertension, unspecified (HCC)     Class 2 severe obesity with serious comorbidity and body mass index (BMI) of 36.0 to 36.9 in adult [facility])     Edema     Closed supracondylar fracture of right humerus     Other chronic pain     Palliative care patient     Chest pain     Recurrent falls     Renal failure     Difficulty in walking     End stage renal disease (HCC)     Weakness     Other seizures (HCC)     Moderate persistent asthma without complication     Thrush     COVID-19     Post PTCA     Falls frequently     Chest wall contusion, unspecified laterality, initial encounter     Chest wall contusion, left, initial encounter     Headache, unspecified     Encounter for immunization     Encounter for screening for respiratory tuberculosis     Paranoid schizophrenia ([facility]     Compression fracture of spine ([facility]     Closed rib fracture      Subjective:  Admit Date: 2/16/2021    Interval History: no issues rib cage pain d/t fractures  Medications:  Scheduled Meds:   polyethylene glycol  17 g Oral Daily    magnesium hydroxide  30 mL Oral Nightly    heparin (porcine)  2,000 Units Intravenous Once    insulin glargine  30 Units Subcutaneous Nightly    heparin (porcine)  5,000 Units Subcutaneous 3 times per day    levoFLOXacin  500 mg Oral Every Other Day    ARIPiprazole  5 mg Oral Daily    atorvastatin  40 mg Oral Daily    metoprolol tartrate  12.5 mg Oral BID    pantoprazole  20 mg Oral Daily    pregabalin  75 mg Oral BID    sertraline  50 mg Oral Daily    sodium chloride flush  10 mL Intravenous 2 times per day    insulin lispro  0-12 Units Subcutaneous TID WC    insulin lispro  0-6 Units Subcutaneous Nightly    lidocaine  1 patch Transdermal Daily    isosorbide mononitrate  60 mg Oral Daily    aspirin  81 mg Oral Daily    b complex-C-folic acid  1 capsule Oral Daily    ticagrelor  90 mg Oral BID    magnesium oxide  400 mg Oral Daily    ranolazine  500 mg Oral BID    vitamin B-12  100 mcg Oral Daily    docusate sodium  100 mg Oral BID    senna  1 tablet Oral Nightly     Continuous Infusions:   dextrose         CBC: No results for input(s): WBC, HGB, PLT in the last 72 hours. CMP:  No results for input(s): NA, K, CL, CO2, BUN, CREATININE, GLUCOSE, CALCIUM, LABGLOM in the last 72 hours. Troponin: No results for input(s): TROPONINI in the last 72 hours. BNP: No results for input(s): BNP in the last 72 hours. INR: No results for input(s): INR in the last 72 hours. Lipids: No results for input(s): CHOL, LDLDIRECT, TRIG, HDL, AMYLASE, LIPASE in the last 72 hours. Liver: No results for input(s): AST, ALT, ALKPHOS, PROT, LABALBU, BILITOT in the last 72 hours.     Invalid input(s): BILDIR  Iron:  No results for input(s): IRONS, FERRITIN in the last 72 hours. Invalid input(s): LABIRONS  Urinalysis: No results for input(s): UA in the last 72 hours.     Objective:  Vitals: BP (!) 125/47   Pulse 69   Temp 97.9 °F (36.6 °C) (Oral)   Resp 19   Ht 5' 7\" (1.702 m)   Wt 201 lb 8 oz (91.4 kg)   LMP  (LMP Unknown)   SpO2 94%   BMI 31.56 kg/m²    Wt Readings from Last 3 Encounters:   02/20/21 201 lb 8 oz (91.4 kg)   01/29/21 217 lb 1.6 oz (98.5 kg)   01/19/21 213 lb (96.6 kg)      24HR INTAKE/OUTPUT:  No intake or output data in the 24 hours ending 02/22/21 1243    General: alert, in no apparent distress  HEENT: normocephalic, atraumatic, anicteric  Neck: supple, no mass  Lungs: non-labored respirations, clear to auscultation bilaterally  Heart: regular rate and rhythm, no murmurs or rubs  Abdomen: soft, non-tender, non-distended  Ext: no cyanosis, no peripheral edema  Neuro: alert and oriented, no gross abnormalities  Psych: normal mood and affect  Skin: no rash      Electronically signed by Alexandria Schuster MD

## 2021-02-22 NOTE — PROGRESS NOTES
TRAUMA DAILY PROGRESS NOTE      Patient Name: Olga Lawson  Admission Date 2/16/2021    Hospital Day: 4  Patient seen and examined on 2/22/2021    INTERVAL HISTORY/EVENTS     Background:  Michelle Le is a 59 y. o. female with PMH significant for CAD s/p stents (2015, 2018), ESRD on HD (T/Th/Sat), HTN, HLD, DM, Hepatitis C, GERD, COPD, seizures, anxiety and COVID-19.  She presented to the ED on 2/16/2021 after becoming weak and nearly falling while transferring from a wheelchair to a car. Vernal Vera denies falling.     Trauma work up found chronic R sided rib fractures as well as stable T11 compression fracture from prior 1/2021 imaging.  Initially there was concern that the rib fractures were new so the patient was admitted to the [facility] under the Trauma service. Nephrology and the inpatient hospitalist service was consulted.      Hospital Course:  2/16/2021: Presented to ED after becoming weak while transferring from a wheelchair to a car.  Denies traumatic injury/fall. Admitted to the [facility] under the Trauma service. 2/18/2021: Medically cleared for discharge. Family and patient requesting rehab. SW consulted for assist with dispo planning. Pt underwent routine dialysis   2/19/2021: remained medically cleared, await placement. Covid PCR ordered d/t facility not able to accept patient without test  2/20/2021: Routine dialysis. CM contacted regarding d/c, lab without covid supplies to run test. Awaiting test. Remains medically cleared   2/21/2021: awaiting covid results, medially cleared     24 Hour Events:  No acute overnight events. Patient states that she is feeling well today, and anxious to be discharge to SNF. Patient reports BM this morning. Tolerating PO without difficulty.  Awaiting Covid PCR test prior to patient being transferred to SNF    No new labs for review today    No I&O documented, patient reports BM x1 this morning, and voiding 2 times daily (which is normal for her 2/2 ESRD)      PHYSICAL EXAM Vitals Average, Min and Max for last 24 hours:  Temp: Temp: 98 °F (36.7 °C); No data recorded  Respirations: No data recorded  Pulse: No data recorded  Blood Pressure: No data recorded.  ; No data recorded. SpO2: No data recorded    24hr Intake/Output: No intake or output data in the 24 hours ending 02/22/21 0747    Vitals: BP (!) 108/45   Pulse 66   Temp 98 °F (36.7 °C) (Oral)   Resp 18   Ht 5' 7\" (1.702 m)   Wt 201 lb 8 oz (91.4 kg)   LMP  (LMP Unknown)   SpO2 100%   BMI 31.56 kg/m²     Physical Exam:  Constitutional: sitting up in bed, no acute distress, non-toxic appearing  Cardiovascular: Regular rate and rhythm. Pulmonary: Clear to ausculation bilaterally. No wheezing, rhonchi or rales. Abdominal: Soft, non-tender to palpation, BS auscultated throughout  Musculoskeletal: Good ROM in all extremities. No calf tenderness or swelling   Neurological: Alert, awake, and orientated x 3. Motor and sensory grossly intact. No focal deficits. GCS of 15.    Skin: warm and dry    LABORATORY RESULTS (LAST 24 HOURS)     CBC with Differential:    Lab Results   Component Value Date    WBC 6.1 02/18/2021    RBC 3.10 02/18/2021    HGB 9.5 02/18/2021    HCT 28.1 02/18/2021     02/18/2021    MCV 90.6 02/18/2021    MCH 30.7 02/18/2021    MCHC 33.9 02/18/2021    RDW 15.6 02/18/2021    NRBC 0.0 10/16/2020    BANDSPCT 5 06/14/2013    LYMPHOPCT 10.1 02/16/2021    MONOPCT 8.6 02/16/2021    EOSPCT 3.5 03/05/2020    BASOPCT 0.4 02/16/2021    MONOSABS 0.5 02/16/2021    LYMPHSABS 0.6 02/16/2021    EOSABS 0.1 02/16/2021    BASOSABS 0.0 02/16/2021     CMP:    Lab Results   Component Value Date     02/18/2021    K 3.7 02/18/2021    CL 92 02/18/2021    CO2 27 02/18/2021    BUN 41 02/18/2021    CREATININE 4.66 02/18/2021    GFRAA 11.5 02/18/2021    LABGLOM 9.5 02/18/2021    GLUCOSE 174 02/18/2021    GLUCOSE 102 10/16/2020    PROT 8.4 02/16/2021    LABALBU 4.6 02/16/2021    CALCIUM 9.0 02/18/2021    BILITOT 1.0 02/16/2021    ALKPHOS 127 02/16/2021    AST 23 02/16/2021    ALT 16 02/16/2021     Magnesium:    Lab Results   Component Value Date    MG 1.9 02/18/2021     PT/INR:    Lab Results   Component Value Date    PROTIME 15.2 02/16/2021    INR 1.2 02/16/2021     PTT:    Lab Results   Component Value Date    APTT 34.1 02/16/2021       IMAGING RESULTS (PERSONALLY REVIEWED)     No new imaging for review today    ASSESSMENT & PLAN     Diagnoses:  1. Weakness but no reported fall when transferring from wheelchair to car on 2/16/2021  2. Chronic T11 compression fracture  3. Chronic right 4-9 rib fractures  4. Generalized weakness  5. Acute pain due to rib fractures  6. COVID 19 (detected in 12/2020)  7. History of ESRD     PMHx: CAD s/p stents (2015, 2018), ESRD on HD (T/Th/Sat), HTN, HLD, DM, Hepatitis C, GERD, COPD, seizures, anxiety and COVID-19.      Incidental Findings: (reviewed 2/17/2021 HA, discussed with patient by Edwige Camp PA-C on 2/19/2021)  1. There is fatty atrophy of the pancreas. 2. Subtle nodularity of the liver suggests cirrhosis.       ASSESSMENT/PLAN:  Neurological: Acute on chronic pain  - Continue Tylenol 650mg q4h prn  - Continue Tramadol 25-50mg q6h prn moderate-severe pain  - Continue home Lyrica 75mg BID  - Continue Lidocaine patches  - Continue Melatonin 5mg qHS prn sleep  - Continue home Abilify 5mg daily  - Continue home Zoloft 50mg daily     Cardiovascular: Hx HTN, HLD, CAD (s/p stents). No acute cardiac issues  - Continue home ASA, Brilinta, Metoprolol, Imdur, Lipitor, Ranexa  - Continue monitoring hemodynamic status per unit protocol  - No need for telemetry     Respiratory: Chronic R rib fxs, COVID 19+ at time of admission, with hx of remote infection (12/2020).  During hospitalization without respiratory symptoms, and not requiring supplemental O2  - Maintain O2 sats >92%, encourage IS.  - COVID test ordered for discharge purposes and pending  - Continue isolation per hospital Trauma Attending physician, Leonidas Stark MD.    Teaching Physician Note    I saw and evaluated the patient and reviewed all labs and imaging in the last 24 hours. I personally obtained the key and critical portions of the history and physical exam.  I reviewed the EFFIE's documentation and discussed the patient with the EFFIE. I agree with the EFFIE's medical decision making as documented in the EFFIE note. 59y female admitted with chronic rib fractures after fall. No acute events overnight. Awaiting COVID PCR for placement. Dialysis tomorrow if still in house.     Leonidas Stark MD  Trauma, Surgical Critical Care, and Emergency General Surgery

## 2021-02-22 NOTE — CARE COORDINATION
Per Sulema, admissions at [facility], await the PCR results. Per the RN, the patient's droplet precautions have been cancelled. Electronically signed by HIPOLITO Mccallum on 2/22/21 at 2:08 PM EST    The LSW met with the patient. The patient is aware that [facility] and [employer] remain following. Await the PCR test/results. Electronically signed by Quyen Hunter on 2/22/21 at 2:47 PM EST    Patient and her daughter request SNF at discharge. Per the patient's PT - the patient is having difficulty managing the wheeled walker. The patient is short of breath with activity.  Electronically signed by HIPOLITO Mccallum on 2/22/21 at 2:59 PM EST

## 2021-02-22 NOTE — PROGRESS NOTES
[address]  Chief Complaint   Patient presents with    Insomnia       REASON FOR VISIT:  The patient being seen today for acute visit for insomnia. SUBJECTIVE:  The resident reports that she is having difficulty sleeping. She would like her melatonin increased. Otherwise, no concerns. FAMILY AND SOCIAL HISTORY:  Refer to H&P. Past Medical History:   Diagnosis Date    Anxiety     CAD S/P percutaneous coronary angioplasty 2015, 2018    stents per dr Denilson Frost    CHF (congestive heart failure) ([address]     CKD (chronic kidney disease) stage 4, GFR 15-29 ml/min ([address] 2/24/2018    CKD stage 4 due to type 2 diabetes mellitus ([address]     COPD (chronic obstructive pulmonary disease) ([address]     Diabetic nephropathy with proteinuria ([address] 2014    DJD (degenerative joint disease) of knee     Dr Benson Barry GERD (gastroesophageal reflux disease)     Hemiparesis, left ([address] 2013    [facility] Assisted Living ([facility])    Hemodialysis patient [facility])     History of heart failure     History of seizures     History of type C viral hepatitis     HTN (hypertension)     Hyperlipidemia     Impaired mobility and activities of daily living     Mediastinal lymphadenopathy 2013    Robert March    Metabolic syndrome     Moderate persistent asthma without complication 1/69/4244    Neurogenic urinary incontinence 2013    Neuropathy in diabetes ([address]     Obesity (BMI 30-39. 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

## 2021-02-22 NOTE — PROGRESS NOTES
GLUCOSE, PHOS, MG in the last 72 hours. Invalid input(s): CA  PT/INR:No results for input(s): PROTIME, INR in the last 72 hours. APTT:No results for input(s): APTT in the last 72 hours. LIVER PROFILE:No results for input(s): AST, ALT, BILIDIR, BILITOT, ALKPHOS in the last 72 hours. Imaging Last 24 Hours:  No results found. Assessment//Plan           Hospital Problems           Last Modified POA    * (Principal) Chest wall contusion, unspecified laterality, initial encounter 2/19/2021 Yes    End stage renal disease ([facility] 2/19/2021 Yes    Chest wall contusion, left, initial encounter 2/18/2021 Yes    Compression fracture of spine ([facility] 2/19/2021 Yes    Closed rib fracture 2/19/2021 Yes        Assessment & Plan  2/22: Continue with maintenance hemodialysis. Patient diagnosed with Covid on the December does not need to be on this isolation. Patient is not shortness of breath does not have a cough or fever. Spoke with nursing. Patient chest pain is a lot better compare to before. Anemia of chronic dz, HTN stable  Awaiting for placement.   Electronically signed by Davon Oshea MD on 2/22/21 at 2:29 PM EST

## 2021-02-23 PROBLEM — S20.211A CONTUSION OF RIGHT CHEST WALL: Status: ACTIVE | Noted: 2021-02-16

## 2021-02-23 LAB
ANION GAP SERPL CALCULATED.3IONS-SCNC: 14 MEQ/L (ref 9–15)
BASOPHILS ABSOLUTE: 0 K/UL (ref 0–0.2)
BASOPHILS RELATIVE PERCENT: 0.7 %
BUN BLDV-MCNC: 55 MG/DL (ref 8–23)
CALCIUM SERPL-MCNC: 9.3 MG/DL (ref 8.5–9.9)
CHLORIDE BLD-SCNC: 96 MEQ/L (ref 95–107)
CO2: 23 MEQ/L (ref 20–31)
CREAT SERPL-MCNC: 6.51 MG/DL (ref 0.5–0.9)
EOSINOPHILS ABSOLUTE: 0.3 K/UL (ref 0–0.7)
EOSINOPHILS RELATIVE PERCENT: 4.7 %
GFR AFRICAN AMERICAN: 7.8
GFR NON-AFRICAN AMERICAN: 6.4
GLUCOSE BLD-MCNC: 216 MG/DL (ref 60–115)
GLUCOSE BLD-MCNC: 240 MG/DL (ref 70–99)
GLUCOSE BLD-MCNC: 267 MG/DL (ref 60–115)
HCT VFR BLD CALC: 30.6 % (ref 37–47)
HEMOGLOBIN: 10.4 G/DL (ref 12–16)
LYMPHOCYTES ABSOLUTE: 1.3 K/UL (ref 1–4.8)
LYMPHOCYTES RELATIVE PERCENT: 22 %
MCH RBC QN AUTO: 30.4 PG (ref 27–31.3)
MCHC RBC AUTO-ENTMCNC: 34.1 % (ref 33–37)
MCV RBC AUTO: 89.2 FL (ref 82–100)
MONOCYTES ABSOLUTE: 0.5 K/UL (ref 0.2–0.8)
MONOCYTES RELATIVE PERCENT: 7.5 %
NEUTROPHILS ABSOLUTE: 3.9 K/UL (ref 1.4–6.5)
NEUTROPHILS RELATIVE PERCENT: 65.1 %
PDW BLD-RTO: 16 % (ref 11.5–14.5)
PERFORMED ON: ABNORMAL
PERFORMED ON: ABNORMAL
PLATELET # BLD: 168 K/UL (ref 130–400)
POTASSIUM SERPL-SCNC: 4.5 MEQ/L (ref 3.4–4.9)
RBC # BLD: 3.43 M/UL (ref 4.2–5.4)
SARS-COV-2: NOT DETECTED
SODIUM BLD-SCNC: 133 MEQ/L (ref 135–144)
SOURCE: NORMAL
WBC # BLD: 6 K/UL (ref 4.8–10.8)

## 2021-02-23 PROCEDURE — 2580000003 HC RX 258: Performed by: SURGERY

## 2021-02-23 PROCEDURE — 97116 GAIT TRAINING THERAPY: CPT

## 2021-02-23 PROCEDURE — 6370000000 HC RX 637 (ALT 250 FOR IP): Performed by: INTERNAL MEDICINE

## 2021-02-23 PROCEDURE — 99232 SBSQ HOSP IP/OBS MODERATE 35: CPT | Performed by: SURGERY

## 2021-02-23 PROCEDURE — 80048 BASIC METABOLIC PNL TOTAL CA: CPT

## 2021-02-23 PROCEDURE — 85025 COMPLETE CBC W/AUTO DIFF WBC: CPT

## 2021-02-23 PROCEDURE — 6370000000 HC RX 637 (ALT 250 FOR IP): Performed by: SURGERY

## 2021-02-23 PROCEDURE — 2060000000 HC ICU INTERMEDIATE R&B

## 2021-02-23 PROCEDURE — 36415 COLL VENOUS BLD VENIPUNCTURE: CPT

## 2021-02-23 PROCEDURE — 6370000000 HC RX 637 (ALT 250 FOR IP): Performed by: NURSE PRACTITIONER

## 2021-02-23 PROCEDURE — 6360000002 HC RX W HCPCS: Performed by: INTERNAL MEDICINE

## 2021-02-23 PROCEDURE — 6370000000 HC RX 637 (ALT 250 FOR IP): Performed by: PHYSICIAN ASSISTANT

## 2021-02-23 RX ADMIN — POLYETHYLENE GLYCOL 3350 17 G: 17 POWDER, FOR SOLUTION ORAL at 09:29

## 2021-02-23 RX ADMIN — INSULIN GLARGINE 30 UNITS: 100 INJECTION, SOLUTION SUBCUTANEOUS at 23:11

## 2021-02-23 RX ADMIN — ISOSORBIDE MONONITRATE 60 MG: 60 TABLET, EXTENDED RELEASE ORAL at 09:30

## 2021-02-23 RX ADMIN — RANOLAZINE 500 MG: 500 TABLET, FILM COATED, EXTENDED RELEASE ORAL at 21:15

## 2021-02-23 RX ADMIN — HEPARIN SODIUM 5000 UNITS: 5000 INJECTION INTRAVENOUS; SUBCUTANEOUS at 23:11

## 2021-02-23 RX ADMIN — PREGABALIN 75 MG: 75 CAPSULE ORAL at 09:30

## 2021-02-23 RX ADMIN — PREGABALIN 75 MG: 75 CAPSULE ORAL at 21:06

## 2021-02-23 RX ADMIN — METOPROLOL TARTRATE 12.5 MG: 25 TABLET, FILM COATED ORAL at 21:05

## 2021-02-23 RX ADMIN — HEPARIN SODIUM 5000 UNITS: 5000 INJECTION INTRAVENOUS; SUBCUTANEOUS at 14:04

## 2021-02-23 RX ADMIN — Medication 400 MG: at 09:31

## 2021-02-23 RX ADMIN — TICAGRELOR 90 MG: 90 TABLET ORAL at 09:30

## 2021-02-23 RX ADMIN — SENNOSIDES 8.6 MG: 8.6 TABLET, FILM COATED ORAL at 21:06

## 2021-02-23 RX ADMIN — INSULIN LISPRO 4 UNITS: 100 INJECTION, SOLUTION INTRAVENOUS; SUBCUTANEOUS at 12:22

## 2021-02-23 RX ADMIN — PANTOPRAZOLE SODIUM 20 MG: 20 TABLET, DELAYED RELEASE ORAL at 09:31

## 2021-02-23 RX ADMIN — ARIPIPRAZOLE 5 MG: 5 TABLET ORAL at 09:29

## 2021-02-23 RX ADMIN — DOCUSATE SODIUM 100 MG: 100 CAPSULE, LIQUID FILLED ORAL at 21:05

## 2021-02-23 RX ADMIN — Medication 10 ML: at 23:18

## 2021-02-23 RX ADMIN — TRAMADOL HYDROCHLORIDE 100 MG: 50 TABLET, FILM COATED ORAL at 14:12

## 2021-02-23 RX ADMIN — RANOLAZINE 500 MG: 500 TABLET, FILM COATED, EXTENDED RELEASE ORAL at 09:30

## 2021-02-23 RX ADMIN — DIPHENHYDRAMINE HCL 25 MG: 25 TABLET ORAL at 21:14

## 2021-02-23 RX ADMIN — ATORVASTATIN CALCIUM 40 MG: 40 TABLET, FILM COATED ORAL at 09:30

## 2021-02-23 RX ADMIN — DOCUSATE SODIUM 100 MG: 100 CAPSULE, LIQUID FILLED ORAL at 09:32

## 2021-02-23 RX ADMIN — HEPARIN SODIUM 5000 UNITS: 5000 INJECTION INTRAVENOUS; SUBCUTANEOUS at 07:00

## 2021-02-23 RX ADMIN — SERTRALINE HYDROCHLORIDE 50 MG: 50 TABLET ORAL at 09:31

## 2021-02-23 RX ADMIN — NEPHROCAP 1 MG: 1 CAP ORAL at 09:29

## 2021-02-23 RX ADMIN — ASPIRIN 81 MG CHEWABLE TABLET 81 MG: 81 TABLET CHEWABLE at 09:30

## 2021-02-23 RX ADMIN — TRAMADOL HYDROCHLORIDE 50 MG: 50 TABLET, FILM COATED ORAL at 21:14

## 2021-02-23 RX ADMIN — Medication 10 ML: at 09:32

## 2021-02-23 RX ADMIN — TICAGRELOR 90 MG: 90 TABLET ORAL at 21:06

## 2021-02-23 RX ADMIN — Medication 100 MCG: at 09:30

## 2021-02-23 RX ADMIN — METOPROLOL TARTRATE 12.5 MG: 25 TABLET, FILM COATED ORAL at 09:30

## 2021-02-23 ASSESSMENT — ENCOUNTER SYMPTOMS
NAUSEA: 0
DIARRHEA: 0
COUGH: 0
SHORTNESS OF BREATH: 0
VOMITING: 0

## 2021-02-23 ASSESSMENT — PAIN DESCRIPTION - PAIN TYPE
TYPE: ACUTE PAIN
TYPE: ACUTE PAIN

## 2021-02-23 ASSESSMENT — PAIN SCALES - GENERAL
PAINLEVEL_OUTOF10: 0
PAINLEVEL_OUTOF10: 7

## 2021-02-23 NOTE — PROGRESS NOTES
Nutrition Assessment     Type and Reason for Visit: Initial, RD Nutrition Re-Screen/LOS(LOS #7)    Nutrition Recommendations/Plan: Continue Renal;Carb Control 4 diet    Nutrition Assessment:  Pt being seen for LOS #7, remains stable from a nutrition standpoint with adequate po intake.   Will continue to monitor while inpatient    Malnutrition Assessment:  Malnutrition Status: No malnutrition    Current Nutrition Therapies:    DIET RENAL; Carb Control: 4 carb choices (60 gms)/meal (%)    Anthropometric Measures:  · Height: 5' 7\" (170.2 cm)  · Current Body Wt: 201 lb (91.2 kg)(wt changes with fluid shifts from HD)   · BMI: 31.5    Nutrition Diagnosis:   No nutrition diagnosis at this time     Nutrition Interventions:   Food and/or Nutrient Delivery:  Continue Current Diet  Nutrition Education/Counseling:   No recommendations at this time  Coordination of Nutrition Care:   Continue Inpatient Monitoring    Goals:  po intake > 75% of meals, anticipate wt changes with fluid shifts from dialysis     Nutrition Monitoring and Evaluation:   Food/Nutrient Intake Outcomes:  Food and Nutrient Intake  Physical Signs/Symptoms Outcomes:  Biochemical Data, Weight     Discharge Planning:          Electronically signed by Silvana Saba RD, LD on 2/23/21 at 11:23 AM EST

## 2021-02-23 NOTE — PROGRESS NOTES
TRAUMA DAILY PROGRESS NOTE      Patient Name: Haritha Golden  Admission Date 2/16/2021    Hospital Day: 5  Patient seen and examined on 2/23/2021    INTERVAL HISTORY/EVENTS     Background:  Michelle Le is a 59 y. o. female with PMH significant for CAD s/p stents (2015, 2018), ESRD on HD (T/Th/Sat), HTN, HLD, DM, Hepatitis C, GERD, COPD, seizures, anxiety and COVID-19.  She presented to the ED on 2/16/2021 after becoming weak and nearly falling while transferring from a wheelchair to a car. Nuha Harley denies falling.     Trauma work up found chronic R sided rib fractures as well as stable T11 compression fracture from prior 1/2021 imaging.  Initially there was concern that the rib fractures were new so the patient was admitted to the [facility] under the Trauma service. Nephrology and the inpatient hospitalist service was consulted.      Hospital Course:  2/16/2021: Presented to ED after becoming weak while transferring from a wheelchair to a car.  Denies traumatic injury/fall. Admitted to the [facility] under the Trauma service. 2/18/2021: Medically cleared for discharge. Family and patient requesting rehab. SW consulted for assist with dispo planning. Pt underwent routine dialysis   2/19/2021: remained medically cleared, await placement. Covid PCR ordered d/t facility not able to accept patient without test  2/20/2021: Routine dialysis. CM contacted regarding d/c, lab without covid supplies to run test. Awaiting test. Remains medically cleared   2/21/2021: awaiting covid results, medially cleared   2/22/2021: No change in care plan      24 Hour Events:  No acute overnight events. Vitals acceptable. Pt is tolerating PO and voiding on her own. Endorses some hard stools, denies any abdominal pain/n/v. Pt endorses continued right sided rib pain, improved with medications. Denies any new chest pain or sob. IS 1500cc. 100% on RA. Awaiting Covid PCR test prior to patient being transferred to SNF.  Nephrology following and plan for HD today. No new labs for review. No new imaging for review. No I/O documented. PHYSICAL EXAM     Vitals Average, Min and Max for last 24 hours:  Temp: Temp: 97.9 °F (36.6 °C); Temp  Av.8 °F (36.6 °C)  Min: 97.6 °F (36.4 °C)  Max: 97.9 °F (36.6 °C)  Respirations: Resp  Av.3  Min: 18  Max: 19  Pulse: Pulse  Av  Min: 68  Max: 74  Blood Pressure: Systolic (85EBZ), DSH:667 , Min:110 , WSX:867   ; Diastolic (41NON), IWP:09, Min:59, Max:60    SpO2: SpO2  Av.5 %  Min: 97 %  Max: 100 %    24hr Intake/Output: No intake or output data in the 24 hours ending 21 0936    Vitals: /60   Pulse 74   Temp 97.9 °F (36.6 °C) (Oral)   Resp 18   Ht 5' 7\" (1.702 m)   Wt 201 lb 8 oz (91.4 kg)   LMP  (LMP Unknown)   SpO2 100%   BMI 31.56 kg/m²     Physical Exam:  Constitutional: sleeping but easily awoken, pleasant  Cardiovascular: Regular rate and rhythm. Pulmonary: Clear to ausculation bilaterally. No wheezing, rhonchi or rales. Abdominal: Soft, non-tender to palpation, BS auscultated throughout  Musculoskeletal: Good ROM in all extremities. No calf tenderness or swelling   Neurological: Alert, awake, and orientated x 3. Motor and sensory grossly intact. No focal deficits. GCS of 15.    Skin: warm and dry    LABORATORY RESULTS (LAST 24 HOURS)     CBC with Differential:    Lab Results   Component Value Date    WBC 6.1 2021    RBC 3.10 2021    HGB 9.5 2021    HCT 28.1 2021     2021    MCV 90.6 2021    MCH 30.7 2021    MCHC 33.9 2021    RDW 15.6 2021    NRBC 0.0 10/16/2020    BANDSPCT 5 2013    LYMPHOPCT 10.1 2021    MONOPCT 8.6 2021    EOSPCT 3.5 2020    BASOPCT 0.4 2021    MONOSABS 0.5 2021    LYMPHSABS 0.6 2021    EOSABS 0.1 2021    BASOSABS 0.0 2021     CMP:    Lab Results   Component Value Date     2021    K 3.7 2021    CL 92 2021    CO2 27 02/18/2021    BUN 41 02/18/2021    CREATININE 4.66 02/18/2021    GFRAA 11.5 02/18/2021    LABGLOM 9.5 02/18/2021    GLUCOSE 174 02/18/2021    GLUCOSE 102 10/16/2020    PROT 8.4 02/16/2021    LABALBU 4.6 02/16/2021    CALCIUM 9.0 02/18/2021    BILITOT 1.0 02/16/2021    ALKPHOS 127 02/16/2021    AST 23 02/16/2021    ALT 16 02/16/2021     Magnesium:    Lab Results   Component Value Date    MG 1.9 02/18/2021     PT/INR:    Lab Results   Component Value Date    PROTIME 15.2 02/16/2021    INR 1.2 02/16/2021     PTT:    Lab Results   Component Value Date    APTT 34.1 02/16/2021       IMAGING RESULTS (PERSONALLY REVIEWED)     No new imaging for review today    ASSESSMENT & PLAN     Diagnoses:  1. Weakness but no reported fall when transferring from wheelchair to car on 2/16/2021  2. Chronic T11 compression fracture  3. Chronic right 4-9 rib fractures  4. Generalized weakness  5. Acute pain due to rib fractures  6. COVID 19 (detected in 12/2020)  7. History of ESRD     PMHx: CAD s/p stents (2015, 2018), ESRD on HD (T/Th/Sat), HTN, HLD, DM, Hepatitis C, GERD, COPD, seizures, anxiety and COVID-19.      Incidental Findings: (reviewed 2/17/2021 HA, discussed with patient by Edwige Camp PA-C on 2/19/2021)  1. There is fatty atrophy of the pancreas. 2. Subtle nodularity of the liver suggests cirrhosis.       ASSESSMENT/PLAN:  Neurological: Acute on chronic pain  - Continue Tylenol 650mg q4h prn  - Continue Tramadol 25-50mg q6h prn moderate-severe pain  - Continue home Lyrica 75mg BID  - Continue Lidocaine patches  - Continue Melatonin 5mg qHS prn sleep  - Continue home Abilify 5mg daily  - Continue home Zoloft 50mg daily     Cardiovascular: Hx HTN, HLD, CAD (s/p stents).  No acute cardiac issues  - Continue home ASA, Brilinta, Metoprolol, Imdur, Lipitor, Ranexa  - Continue monitoring hemodynamic status per unit protocol  - No need for telemetry     Respiratory: Chronic R rib fxs, COVID 19+ at time of admission, with hx of remote infection (12/2020). During hospitalization without respiratory symptoms, and not requiring supplemental O2. IS 1500 cc, 100% on RA  - Maintain O2 sats >92%, encourage IS   - COVID test ordered for discharge purposes and pending  - Continue isolation per hospital protocol     GI/Diet: Tolerating PO, endorses hard BM.   - Continue renal diabetic diet  - Continue BR of colace, senna, scheduled Miralax daily. - Continue MOM x 3 doses total qHS     Renal/Electrolytes: Hx ESRD on dialysis T/Th/Sa. Nephrology following and plan for HD today. - No need for IVF  - Monitor I&O q shift  - Continue home Nephrocaps  - Continue routine dialysis while in house     ID: Remote COVID 19 infection in 12/2020, remained Covid + at time of admission. No O2 requirements or respiratory symptoms. Chronic sinusitis on imaging  - Levaquin completed per medicine recs (end date 2/22)  - No need for daily CBCs     Heme: Anemia of chronic dz with acceptable Hgb at time of admit  - No need for daily CBCs  - No indication for transfusion     Endocrine: Hx DMII  - Continue Accu-checks qAC/HS. - Continue ISS AC/HS  - Continue Lantus 30U qHS     MSK: T11 Compression fx (remote) and R rib fx (remote)   - Spines: cleared   - Weight Bearing Restrictions: none  - Activity: ad olga  - PT/OT: eval'd patient, ok for home with Phu[facility] Gary, however patient and family concerned for patient's safety at home and request rehab. CM consulted and placement pending      Prophylaxis:   - Continue home pantoprazole 20mg daily  - Continue SQH for DVT ppx in setting of ESRD     Lines/Tubes/Drains:  - Maintain PIVs  - No indication for howard catheter, monitor for urinary retention        Dispo: Remains medically cleared for discharge, pending placement  Accom Status: General Status     Follow up with PCP for recent hospitalization, to discuss incidental findings and multiple medical co-morbidities  Does not need routine Trauma follow up.     Please call for any questions or concerns.     [address] [place] [address]   [phone] ([phone]) [phone]     This patient's plan of care was discussed and made in collaboration with Trauma Attending physician, Gerson Khan MD.

## 2021-02-23 NOTE — DISCHARGE INSTR - COC
Continuity of Care Form    Patient Name: Paola Talbert   :  1960  MRN:  [id number]    Admit date:  2021  Discharge date:  2021    Code Status Order: Full Code   Advance Directives:      Admitting Physician:  Salo Hogue MD  PCP: Barbra Lyle MD    Discharging Nurse: [facility] Unit/Room#: U509/H254-05  Discharging Unit Phone Number: [phone]    Emergency Contact:   Extended Emergency Contact Information  Primary Emergency Contact: Nichelle [address] Phone: [phone]  Work Phone: [phone]  Mobile Phone: [phone]  Relation: Child    Past Surgical History:  Past Surgical History:   Procedure Laterality Date     SECTION      x1    COLONOSCOPY  2014    Dr. Miguel Angel Martin      x1 Dr. Tammi Marvin, Dr Matt Soni CATH LAB PROCEDURE  10/02/2019    HYSTERECTOMY, TOTAL ABDOMINAL      one ovary intact, Dr Samara Juarez, menorrhagia    [facility] Left 2018    LEFT KNEE TOTAL KNEE ARTHROPLASTY, SHAYNA, NERVE BLOCK performed by Nuris Kc MD at [address] [place] [address] Right     TOTAL KNEE ARTHROPLASTY  16    Dr Jazmine Helms TUNNELED [address] Right 2020    tunneled HD catheter per Dr Connie Rivero       Immunization History:   Immunization History   Administered Date(s) Administered    Influenza Vaccine, unspecified formulation 10/14/2016    Influenza Virus Vaccine 10/20/2014, 10/30/2015, 10/14/2016, 2017, 10/12/2018    Influenza Whole 10/20/2014    Influenza, Quadv, IM, (6 mo and older Fluzone, Flulaval, Fluarix and 3 yrs and older Afluria) 2017, 10/12/2018    Influenza, Quadv, IM, PF (6 mo and older Fluzone, Flulaval, Fluarix, and 3 yrs and older Afluria) 10/03/2019    Influenza, Triv, 3 Years and older, IM (Afluria (5 yrs and older) 10/14/2016    Pneumococcal Polysaccharide ([id number]) 10/15/2016    Tdap (Boostrix, Adacel) 08/03/2020       Active Problems:  Patient Active Problem List   Diagnosis Code    Atherosclerotic heart disease of native coronary artery with unspecified angina pectoris (HCC) I25.119    Schizophrenia, paranoid, chronic Q48.9    Metabolic syndrome H29.13    Vitamin B 12 deficiency E53.8    Cerebral microvascular disease I67.89    Mixed hyperlipidemia E78.2    Other hammer toe (acquired) M20.40    Vitamin D insufficiency E55.9    Incontinence of urine R32    Diabetic nephropathy with proteinuria (HCC) E11.21    Essential (primary) hypertension I10    History of type C viral hepatitis Z86.19    Urinary incontinence due to cognitive impairment R39.81    History of seizures Z87.898    Stented coronary artery-plan is to stay on Plavix indefinately per Dr Yariel Padron Z95.5    Other specified diabetes mellitus with diabetic neuropathy, unspecified ([address] E13.40    Hemiparesis, left ([address] G81.94    Angina, class II ([address] I20.9    Pain, unspecified R52    Tardive dyskinesia G24.01    Shortness of breath R06.02    Uncontrolled type 2 diabetes mellitus with hyperglycemia (HCC) E11.65    Acute diastolic (congestive) heart failure ([facility]) I50.31    Sleep apnea, unspecified G47.30    Pulmonary hypertension, unspecified (HCC) I27.20    Class 2 severe obesity with serious comorbidity and body mass index (BMI) of 36.0 to 36.9 in adult ([facility]) E66.01, Z68.36    Edema R60.9    Closed supracondylar fracture of right humerus S42.411A    Other chronic pain G89.29    Palliative care patient Z51.5    Chest pain R07.9    Recurrent falls R29.6    Renal failure N19    Difficulty in walking R26.2    End stage renal disease (HCC) N18.6    Weakness R53.1    Other seizures ([facility]) G40.89    Moderate persistent asthma without complication H60.41    Thrush B37.0    COVID-19 U07.1    Post PTCA Z98.61    Falls frequently R29.6    Contusion of right chest wall S20.211A    Chest wall contusion, left, initial encounter S20.212A    Headache, unspecified R51.9    Encounter for immunization Z23    Encounter for screening for respiratory tuberculosis Z11.1    Paranoid schizophrenia ([facility] F20.0    Compression fracture of spine ([facility] M48.50XA    Closed rib fracture S22.39XA       Isolation/Infection:   Isolation            No Isolation          Patient Infection Status       Infection Onset Added Last Indicated Last Indicated By Review Planned Expiration Resolved Resolved By    None active    Resolved    COVID-19 Rule Out 21 Covid-19 Ambulatory (Ordered)   21 Vasile Rouse RN    Per Dr. Sharyle Haff no need for droplet plus isolation    COVID-19 21 COVID-19, Rapid   21 Vasile Rouse RN    Isolation status removed per provider    C-diff Rule Out 21 Gastrointestinal Panel by DNA (Ordered)   21 Vasile Rouse RN    Order discontinued    COVID-19  21 COVID-19   21     Positive 21. Electronically signed by Olive Mak RN on 21 at 7:22 AM EST       COVID-19 20 COVID-19   21 Olive Mak RN    COVID-19 Rule Out 20 COVID-19 (Ordered)   20 Rule-Out Test Resulted    COVID-19 Rule Out 20 COVID-19 (Ordered)   20 Rule-Out Test Resulted    COVID-19 Rule Out 20 COVID-19 (Ordered)   20 Rule-Out Test Resulted    COVID-19 Rule Out 20 COVID-19 (Ordered)   20 Rule-Out Test Resulted    COVID-19 Rule Out 20 COVID-19 (Ordered)   20 Rule-Out Test Resulted    Pt has been possitive on December            Nurse Assessment:  Last Vital Signs: BP (!) 122/55   Pulse 67   Temp 97.5 °F (36.4 °C) (Oral)   Resp 18   Ht 5' 7\" (1.702 m)   Wt 201 lb 8 oz (91.4 kg)   LMP  (LMP Unknown)   SpO2 97%   BMI 31.56 kg/m²     Last documented pain score (0-10 scale): Pain Level: 0  Last Weight:   Wt Readings from Last 1 Encounters:   02/20/21 201 lb 8 oz (91.4 kg)     Mental Status:  oriented and alert    IV Access:  - None    Nursing Mobility/ADLs:  Walking   Assisted  Transfer  Assisted  Bathing  Assisted  Dressing  Assisted  Toileting  Independent  Feeding  Independent  Med Admin  Independent  Med Delivery   whole    Wound Care Documentation and Therapy:  Wound 06/28/20 Arm Distal;Left;Lower red, open  (Active)   Number of days: 239        Elimination:  Continence:   · Bowel: Yes  · Bladder: Yes  Urinary Catheter: None   Colostomy/Ileostomy/Ileal Conduit: No       Date of Last BM: 2/23/2021  No intake or output data in the 24 hours ending 02/23/21 1230  No intake/output data recorded. Safety Concerns:     History of Falls (last 30 days) and At Risk for Falls    Impairments/Disabilities:      None    Nutrition Therapy:  Current Nutrition Therapy:   - Oral Diet:  Carb Control 4 carbs/meal (1800kcals/day) and Renal    Routes of Feeding: Oral  Liquids: Thin Liquids  Daily Fluid Restriction: no  Last Modified Barium Swallow with Video (Video Swallowing Test): not done    Treatments at the Time of Hospital Discharge:   Respiratory Treatments:   Oxygen Therapy:  is not on home oxygen therapy. Ventilator:    - No ventilator support    Rehab Therapies: Physical Therapy and Occupational Therapy  Weight Bearing Status/Restrictions: No weight bearing restirctions  Other Medical Equipment (for information only, NOT a DME order):  walker  Other Treatments: Blood sugar checks before meals and bedtime. Hemodialysis tues, thurs, sat. Right AV fistula    Patient's personal belongings (please select all that are sent with patient):  {[facility] DME Belongings:[id number]}    RN SIGNATURE: Electronically signed by Torin Lemus on 2/24/2021 at 2:28 PM      CASE MANAGEMENT/SOCIAL WORK SECTION    Inpatient Status Date: ***    Readmission Risk Assessment Score:  Readmission Risk              Risk of Unplanned Readmission:        38           Discharging to Facility/ Agency   · Name: [facility]  · Address:  · Phone:[phone]  · Fax:    Dialysis Facility (if applicable)   · Name:  · Address:  · Dialysis Schedule:  · Phone:  · Fax:    / signature:Electronically signed by HIPOLITO Liu on 2/23/21 at 12:30 PM EST      PHYSICIAN SECTION    Prognosis: Good    Condition at Discharge: Stable    Rehab Potential (if transferring to Rehab): Good    Recommended Labs or Other Treatments After Discharge:   - Pt to continue hemodialysis at normal schedule, with appropriate labs while at SNF.  - Patient will continue VTE prophylaxis with Heparin 5,000 units TID while in SNF, and use may be discontinued per discretion of SNF provider. Physician Certification: I certify the above information and transfer of Jose Galicia  is necessary for the continuing treatment of the diagnosis listed and that she requires [place] for less 30 days.      Update Admission H&P: No change in H&P    PHYSICIAN ASSISTANT SIGNATURE:  Electronically signed by Luciano Moody PA-C on 2/24/21 at 11:30 AM EST

## 2021-02-23 NOTE — PROGRESS NOTES
Progress Note  Date:2021       Room:[facility]/W188-01  Patient Name:Michelle Silver     YOB: 1960     Age:61 y.o. Subjective    Subjective:  Symptoms:  No shortness of breath, malaise, cough, chest pain, weakness, headache, chest pressure, anorexia, diarrhea or anxiety. Diet:  No nausea or vomiting. Review of Systems   Respiratory: Negative for cough and shortness of breath. Cardiovascular: Negative for chest pain. Gastrointestinal: Negative for anorexia, diarrhea, nausea and vomiting. Neurological: Negative for weakness. Objective         Vitals Last 24 Hours:  TEMPERATURE:  Temp  Av.7 °F (36.5 °C)  Min: 97.5 °F (36.4 °C)  Max: 97.9 °F (36.6 °C)  RESPIRATIONS RANGE: Resp  Av.3  Min: 18  Max: 19  PULSE OXIMETRY RANGE: SpO2  Av %  Min: 97 %  Max: 100 %  PULSE RANGE: Pulse  Av.8  Min: 67  Max: 74  BLOOD PRESSURE RANGE: Systolic (66RUB), YTT:415 , Min:110 , CK   ; Diastolic (49FAT), RVK:82, Min:55, Max:60    I/O (24Hr): No intake or output data in the 24 hours ending 21 1206  Objective:  General Appearance:  Comfortable, well-appearing and in no acute distress. Vital signs: (most recent): Blood pressure (!) 122/55, pulse 67, temperature 97.5 °F (36.4 °C), temperature source Oral, resp. rate 18, height 5' 7\" (1.702 m), weight 201 lb 8 oz (91.4 kg), SpO2 97 %, not currently breastfeeding. HEENT: Normal HEENT exam.    Lungs:  Normal effort. Heart: Normal rate. Regular rhythm. S1 normal and S2 normal.    Abdomen: Abdomen is soft. Bowel sounds are normal.   There is no epigastric area tenderness. Neurological: Patient is alert and oriented to person, place and time. Pupils:  Pupils are equal, round, and reactive to light. Skin:  Warm. Labs/Imaging/Diagnostics    Labs:  CBC:No results for input(s): WBC, RBC, HGB, HCT, MCV, RDW, PLT in the last 72 hours.   CHEMISTRIES:No results for input(s): NA, K, CL, CO2, BUN, CREATININE, GLUCOSE, PHOS, MG in the last 72 hours. Invalid input(s): CA  PT/INR:No results for input(s): PROTIME, INR in the last 72 hours. APTT:No results for input(s): APTT in the last 72 hours. LIVER PROFILE:No results for input(s): AST, ALT, BILIDIR, BILITOT, ALKPHOS in the last 72 hours. Imaging Last 24 Hours:  No results found. Assessment//Plan           Hospital Problems           Last Modified POA    * (Principal) Contusion of right chest wall 2/23/2021 Yes    End stage renal disease ([facility] 2/19/2021 Yes    Chest wall contusion, left, initial encounter 2/18/2021 Yes    Compression fracture of spine ([facility] 2/19/2021 Yes    Closed rib fracture 2/19/2021 Yes        Assessment & Plan    2/22: Continue with maintenance hemodialysis. Patient diagnosed with Covid on the December does not need to be on this isolation. Patient is not shortness of breath does not have a cough or fever. Spoke with nursing. Patient chest pain is a lot better compare to before. Anemia of chronic dz, HTN stable  Awaiting for placement. 2/23: Patient waiting for pre-CERT. No overnight events. Off isolation. No new complaints. Pt needs to fu with PCP in 1 week, instructed the pt.   Electronically signed by Shanelle Ron MD on 2/22/21 at 2:29 PM EST

## 2021-02-23 NOTE — PROGRESS NOTES
Nephrology Progress Note    Assessment:  ESRDX IHD  Rib fractures  Anemia  OHDX CAD  CVA  DM type-2      Plan:dialysis today  NH once covid negative test    Patient Active Problem List:     Atherosclerotic heart disease of native coronary artery with unspecified angina pectoris (HCC)     Schizophrenia, paranoid, chronic     Metabolic syndrome     Vitamin B 12 deficiency     Cerebral microvascular disease     Mixed hyperlipidemia     Other hammer toe (acquired)     Vitamin D insufficiency     Incontinence of urine     Diabetic nephropathy with proteinuria ([address]     Essential (primary) hypertension     History of type C viral hepatitis     Urinary incontinence due to cognitive impairment     History of seizures     Stented coronary artery-plan is to stay on Plavix indefinately per Dr Dutch Mcgregor     Other specified diabetes mellitus with diabetic neuropathy, unspecified ([address]     Hemiparesis, left (HCC)     Angina, class II ([address]     Pain, unspecified     Tardive dyskinesia     Shortness of breath     Uncontrolled type 2 diabetes mellitus with hyperglycemia (HCC)     Acute diastolic (congestive) heart failure (HCC)     Sleep apnea, unspecified     Pulmonary hypertension, unspecified (HCC)     Class 2 severe obesity with serious comorbidity and body mass index (BMI) of 36.0 to 36.9 in adult [facility])     Edema     Closed supracondylar fracture of right humerus     Other chronic pain     Palliative care patient     Chest pain     Recurrent falls     Renal failure     Difficulty in walking     End stage renal disease (HCC)     Weakness     Other seizures (HCC)     Moderate persistent asthma without complication     Thrush     COVID-19     Post PTCA     Falls frequently     Chest wall contusion, unspecified laterality, initial encounter     Chest wall contusion, left, initial encounter     Headache, unspecified     Encounter for immunization     Encounter for screening for respiratory tuberculosis     Paranoid schizophrenia ([address] Compression fracture of spine (HCC)     Closed rib fracture      Subjective:  Admit Date: 2/16/2021    Interval History:awaiting covid test than NH    Medications:  Scheduled Meds:   polyethylene glycol  17 g Oral Daily    heparin (porcine)  2,000 Units Intravenous Once    insulin glargine  30 Units Subcutaneous Nightly    heparin (porcine)  5,000 Units Subcutaneous 3 times per day    levoFLOXacin  500 mg Oral Every Other Day    ARIPiprazole  5 mg Oral Daily    atorvastatin  40 mg Oral Daily    metoprolol tartrate  12.5 mg Oral BID    pantoprazole  20 mg Oral Daily    pregabalin  75 mg Oral BID    sertraline  50 mg Oral Daily    sodium chloride flush  10 mL Intravenous 2 times per day    insulin lispro  0-12 Units Subcutaneous TID WC    insulin lispro  0-6 Units Subcutaneous Nightly    lidocaine  1 patch Transdermal Daily    isosorbide mononitrate  60 mg Oral Daily    aspirin  81 mg Oral Daily    b complex-C-folic acid  1 capsule Oral Daily    ticagrelor  90 mg Oral BID    magnesium oxide  400 mg Oral Daily    ranolazine  500 mg Oral BID    vitamin B-12  100 mcg Oral Daily    docusate sodium  100 mg Oral BID    senna  1 tablet Oral Nightly     Continuous Infusions:   dextrose         CBC: No results for input(s): WBC, HGB, PLT in the last 72 hours. CMP:  No results for input(s): NA, K, CL, CO2, BUN, CREATININE, GLUCOSE, CALCIUM, LABGLOM in the last 72 hours. Troponin: No results for input(s): TROPONINI in the last 72 hours. BNP: No results for input(s): BNP in the last 72 hours. INR: No results for input(s): INR in the last 72 hours. Lipids: No results for input(s): CHOL, LDLDIRECT, TRIG, HDL, AMYLASE, LIPASE in the last 72 hours. Liver: No results for input(s): AST, ALT, ALKPHOS, PROT, LABALBU, BILITOT in the last 72 hours. Invalid input(s): BILDIR  Iron:  No results for input(s): IRONS, FERRITIN in the last 72 hours.     Invalid input(s): LABIRONS  Urinalysis: No results for input(s): UA in the last 72 hours.     Objective:  Vitals: /60   Pulse 74   Temp 97.9 °F (36.6 °C) (Oral)   Resp 18   Ht 5' 7\" (1.702 m)   Wt 201 lb 8 oz (91.4 kg)   LMP  (LMP Unknown)   SpO2 100%   BMI 31.56 kg/m²    Wt Readings from Last 3 Encounters:   02/20/21 201 lb 8 oz (91.4 kg)   01/29/21 217 lb 1.6 oz (98.5 kg)   01/19/21 213 lb (96.6 kg)      24HR INTAKE/OUTPUT:  No intake or output data in the 24 hours ending 02/23/21 1017    General: alert, in no apparent distress  HEENT: normocephalic, atraumatic, anicteric  Neck: supple, no mass  Lungs: non-labored respirations, clear to auscultation bilaterally  Heart: regular rate and rhythm, no murmurs or rubs  Abdomen: soft, non-tender, non-distended  Ext: no cyanosis, no peripheral edema fistula working  Neuro: alert and oriented, no gross abnormalities  Psych: normal mood and affect  Skin: no rash      Electronically signed by Devin Sim MD

## 2021-02-23 NOTE — PROGRESS NOTES
with hyperglycemia ([address]     Shortness of breath 10/02/2019    Tardive dyskinesia 05/16/2019    Angina, class II ([address]     Other specified diabetes mellitus with diabetic neuropathy, unspecified ([address] 08/04/2016    Stented coronary artery-plan is to stay on Plavix indefinately per Dr Yunior Willams 06/02/2016    History of seizures     Urinary incontinence due to cognitive impairment     History of type C viral hepatitis     Diabetic nephropathy with proteinuria ([address]     Incontinence of urine 04/07/2014    Vitamin D insufficiency 02/04/2014    Vitamin B 12 deficiency 06/05/2013    Cerebral microvascular disease 06/05/2013    Hemiparesis, left ([address] 01/01/2013    Schizophrenia, paranoid, chronic     Metabolic syndrome     Mixed hyperlipidemia 12/09/2010    Other hammer toe (acquired) 12/13/2007        Past Medical History:   Diagnosis Date    Anxiety     CAD S/P percutaneous coronary angioplasty 2015, 2018    stents per dr Yovani Bahena CHF (congestive heart failure) ([address]     CKD (chronic kidney disease) stage 4, GFR 15-29 ml/min ([address] 2/24/2018    CKD stage 4 due to type 2 diabetes mellitus ([address]     COPD (chronic obstructive pulmonary disease) ([address]     Diabetic nephropathy with proteinuria ([address] 2014    DJD (degenerative joint disease) of knee     Dr Daniele Gaspar GERD (gastroesophageal reflux disease)     Hemiparesis, left ([address] 2013    [facility] Assisted Living ([facility]    Hemodialysis patient [facility])     History of heart failure     History of seizures     History of type C viral hepatitis     HTN (hypertension)     Hyperlipidemia     Impaired mobility and activities of daily living     Mediastinal lymphadenopathy 2013    Arely Montero    Metabolic syndrome     Moderate persistent asthma without complication 8/16/2884    Neurogenic urinary incontinence 2013    Neuropathy in diabetes ([address]     Obesity (BMI 30-39. 9)     Recurrent UTI     S/P colonoscopy 2014 [facility], focal active colitis    Schizophrenia, paranoid, chronic ([address]     [facility]   [employer] vessel disease, cerebrovascular     Status post total knee replacement, right     Status post total left knee replacement 2018   Myrna Chen 2020    Traumatic [facility]putation of third toe of right foot [facility]     Type 2 diabetes mellitus with renal manifestations, controlled ([facility]     Insulin dependent, Dr Raj Mcneal Urinary incontinence due to cognitive impairment     Vitamin D deficiency      Past Surgical History:   Procedure Laterality Date     SECTION      x1    COLONOSCOPY  2014    Dr. Efrain Lange      x1 Dr. Domenico Olmedo, Dr Wade Or CATH LAB PROCEDURE  10/02/2019    HYSTERECTOMY, TOTAL ABDOMINAL      one ovary intact, Dr Mary Cerda, menorrhagia    ME TOTAL KNEE ARTHROPLASTY Left 2018    LEFT KNEE TOTAL KNEE ARTHROPLASTY, SHAYNA, NERVE BLOCK performed by Rob Koehler MD at [address] [place] [address] Right     TOTAL KNEE ARTHROPLASTY  16    Dr Yates Elder TUNNELED [address] Right 2020    tunneled HD catheter per Dr Nas Everett       Restrictions  Restrictions/Precautions: Fall Risk(High per phillips)    SUBJECTIVE   General  Chart Reviewed: Yes  Family / Caregiver Present: No  Subjective  Subjective: \"I'd like to walk. \"    Pre-Session Pain Report  Pre Treatment Pain Screening  Pain at present: 0  Intervention List: Patient able to continue with treatment  Pain Screening  Patient Currently in Pain: Denies       Post-Session Pain Report  Pain Assessment  Pain Level: 0         OBJECTIVE        Bed mobility  Rolling to Left: Modified independent  Supine to Sit: Modified independent  Sit to Supine: Modified independent  Scooting: Modified independent  Comment: HOB flat, use of HR. Good technique.     Transfers  Sit to Stand: Supervision  Stand to sit: Supervision  Comment: VC's for hand placement on bed vs rollator    Ambulation  Ambulation?: Yes  Ambulation 1  Surface: level tile  Device: Rollator  Assistance: Stand by assistance  Quality of Gait: improved posture, wbos, lateral sway, decreased step length, decreased last heel strike, deviates to L  Gait Deviations: Slow Tere;Deviated path;Decreased step length  Distance: 50' x 2  Comments: Some dizziness after ambulating 50', standing RB utilized with vc's for improved breathing technique. SPO2 remains WNL pre/post ambulation. ASSESSMENT   Assessment: Rollator utilized secondary to pt using one at home. Pt displays decreased safety with gait training this date d/t deviating to L and trouble avoiding objects when manuevering through doorways or turning. Fatigue post ambulation, SpO2 96%. Discharge Recommendations:  Continue to assess pending progress, Patient would benefit from continued therapy after discharge    Goals  Long term goals  Long term goal 1: Pt will demonstrate amb SBA with safest AD 50ft to allow pt to amb inside her home with safety. Long term goal 2: Pt will demonstrate transfers SBA with safest AD to increase pt safety with functional mobility. Long term goal 3: Pt will demonstrate TGUG > or = 13 sec indicating decreased risk for falls. Patient Goals   Patient goals : \"go home\"    PLAN    Times per week: 3-6  Plan Comment: Cont. POC  Safety Devices  Type of devices: Call light within reach, Left in bed, Nurse notified(Pt states she is allowed to get up to use RR by self. Confirmed with NSG)     [facility] (6 CLICK) Shon Elmer Wilson 28 Inpatient Mobility Raw Score : 19     Therapy Time   Individual   Time In 1035   Time Out 1045   Minutes 10      BM/Trsf: 3  Gait: 7       Misael Martinez PTA, 02/23/21 at 10:56 AM         Definitions for assistance levels  Independent = pt does not require any physical supervision or assistance from another person for activity completion. Device may be needed.   Stand by assistance = pt requires verbal cues or instructions from another person, close to but not touching, to perform the activity  Minimal assistance= pt performs 75% or more of the activity; assistance is required to complete the activity  Moderate assistance= pt performs 50% of the activity; assistance is required to complete the activity  Maximal assistance = pt performs 25% of the activity; assistance is required to complete the activity  Dependent = pt requires total physical assistance to accomplish the task

## 2021-02-23 NOTE — PROGRESS NOTES
Treatment time: 210 minutes    Net UF: 1200 mL    Pre weight: 91.4 kg  Post weight: 90.2 kg  EDW: n/a    Access used: RIght upper arm AVF  Access function: good    Medications or blood products given: n/a    Regular outpatient schedule: TTS    Summary of response to treatment: Patient tolerated treatment well, treatment terminated 1/2 hour early to prevent machine from clotting off, blood returned, report given to Emerson Andrade [facility], transport requested for patient. Copy of dialysis treatment record placed in chart, to be scanned into EMR.

## 2021-02-24 VITALS
RESPIRATION RATE: 18 BRPM | HEART RATE: 61 BPM | SYSTOLIC BLOOD PRESSURE: 99 MMHG | OXYGEN SATURATION: 100 % | DIASTOLIC BLOOD PRESSURE: 49 MMHG | WEIGHT: 201.5 LBS | HEIGHT: 67 IN | TEMPERATURE: 97.4 F | BODY MASS INDEX: 31.63 KG/M2

## 2021-02-24 DIAGNOSIS — E11.40 CHRONIC PAINFUL DIABETIC NEUROPATHY (HCC): ICD-10-CM

## 2021-02-24 LAB
ANION GAP SERPL CALCULATED.3IONS-SCNC: 14 MEQ/L (ref 9–15)
BUN BLDV-MCNC: 31 MG/DL (ref 8–23)
CALCIUM SERPL-MCNC: 9.4 MG/DL (ref 8.5–9.9)
CHLORIDE BLD-SCNC: 96 MEQ/L (ref 95–107)
CO2: 25 MEQ/L (ref 20–31)
CREAT SERPL-MCNC: 4.49 MG/DL (ref 0.5–0.9)
GFR AFRICAN AMERICAN: 12
GFR NON-AFRICAN AMERICAN: 9.9
GLUCOSE BLD-MCNC: 129 MG/DL (ref 60–115)
GLUCOSE BLD-MCNC: 167 MG/DL (ref 60–115)
GLUCOSE BLD-MCNC: 83 MG/DL (ref 70–99)
MAGNESIUM: 2.1 MG/DL (ref 1.7–2.4)
PERFORMED ON: ABNORMAL
PERFORMED ON: ABNORMAL
POTASSIUM SERPL-SCNC: 3.7 MEQ/L (ref 3.4–4.9)
SODIUM BLD-SCNC: 135 MEQ/L (ref 135–144)

## 2021-02-24 PROCEDURE — 6370000000 HC RX 637 (ALT 250 FOR IP): Performed by: NURSE PRACTITIONER

## 2021-02-24 PROCEDURE — 6370000000 HC RX 637 (ALT 250 FOR IP): Performed by: INTERNAL MEDICINE

## 2021-02-24 PROCEDURE — 6360000002 HC RX W HCPCS: Performed by: INTERNAL MEDICINE

## 2021-02-24 PROCEDURE — 2580000003 HC RX 258: Performed by: SURGERY

## 2021-02-24 PROCEDURE — 36415 COLL VENOUS BLD VENIPUNCTURE: CPT

## 2021-02-24 PROCEDURE — 6370000000 HC RX 637 (ALT 250 FOR IP): Performed by: SURGERY

## 2021-02-24 PROCEDURE — 80048 BASIC METABOLIC PNL TOTAL CA: CPT

## 2021-02-24 PROCEDURE — 99239 HOSP IP/OBS DSCHRG MGMT >30: CPT | Performed by: PHYSICIAN ASSISTANT

## 2021-02-24 PROCEDURE — 97116 GAIT TRAINING THERAPY: CPT

## 2021-02-24 PROCEDURE — 6370000000 HC RX 637 (ALT 250 FOR IP): Performed by: PHYSICIAN ASSISTANT

## 2021-02-24 PROCEDURE — 83735 ASSAY OF MAGNESIUM: CPT

## 2021-02-24 RX ORDER — HEPARIN SODIUM 5000 [USP'U]/ML
5000 INJECTION, SOLUTION INTRAVENOUS; SUBCUTANEOUS EVERY 8 HOURS SCHEDULED
Qty: 45 ML | Refills: 0 | Status: SHIPPED | OUTPATIENT
Start: 2021-02-24 | End: 2021-07-15

## 2021-02-24 RX ORDER — TRAMADOL HYDROCHLORIDE 50 MG/1
50 TABLET ORAL EVERY 6 HOURS PRN
Qty: 28 TABLET | Refills: 0 | Status: SHIPPED | OUTPATIENT
Start: 2021-02-24 | End: 2021-03-02 | Stop reason: SDUPTHER

## 2021-02-24 RX ORDER — ACETAMINOPHEN 325 MG/1
650 TABLET ORAL EVERY 4 HOURS PRN
Qty: 120 TABLET | Refills: 0 | Status: SHIPPED | OUTPATIENT
Start: 2021-02-24

## 2021-02-24 RX ORDER — PREGABALIN 75 MG/1
CAPSULE ORAL
Qty: 60 CAPSULE | Refills: 2 | Status: SHIPPED | OUTPATIENT
Start: 2021-02-24 | End: 2021-05-12 | Stop reason: SDUPTHER

## 2021-02-24 RX ORDER — PSEUDOEPHEDRINE HCL 30 MG
100 TABLET ORAL 2 TIMES DAILY
Qty: 30 CAPSULE | Refills: 0 | Status: ON HOLD | OUTPATIENT
Start: 2021-02-24 | End: 2022-06-02 | Stop reason: SINTOL

## 2021-02-24 RX ORDER — SENNA PLUS 8.6 MG/1
1 TABLET ORAL NIGHTLY
Qty: 15 TABLET | Refills: 0 | Status: SHIPPED | OUTPATIENT
Start: 2021-02-24 | End: 2021-03-11

## 2021-02-24 RX ADMIN — METOPROLOL TARTRATE 12.5 MG: 25 TABLET, FILM COATED ORAL at 09:57

## 2021-02-24 RX ADMIN — LEVOFLOXACIN 500 MG: 500 TABLET, FILM COATED ORAL at 09:57

## 2021-02-24 RX ADMIN — TRAMADOL HYDROCHLORIDE 100 MG: 50 TABLET, FILM COATED ORAL at 05:07

## 2021-02-24 RX ADMIN — PANTOPRAZOLE SODIUM 20 MG: 20 TABLET, DELAYED RELEASE ORAL at 09:57

## 2021-02-24 RX ADMIN — TICAGRELOR 90 MG: 90 TABLET ORAL at 09:57

## 2021-02-24 RX ADMIN — SERTRALINE HYDROCHLORIDE 50 MG: 50 TABLET ORAL at 09:57

## 2021-02-24 RX ADMIN — DOCUSATE SODIUM 100 MG: 100 CAPSULE, LIQUID FILLED ORAL at 09:58

## 2021-02-24 RX ADMIN — HEPARIN SODIUM 5000 UNITS: 5000 INJECTION INTRAVENOUS; SUBCUTANEOUS at 13:24

## 2021-02-24 RX ADMIN — POLYETHYLENE GLYCOL 3350 17 G: 17 POWDER, FOR SOLUTION ORAL at 09:58

## 2021-02-24 RX ADMIN — ISOSORBIDE MONONITRATE 60 MG: 60 TABLET, EXTENDED RELEASE ORAL at 09:57

## 2021-02-24 RX ADMIN — ARIPIPRAZOLE 5 MG: 5 TABLET ORAL at 09:58

## 2021-02-24 RX ADMIN — INSULIN LISPRO 2 UNITS: 100 INJECTION, SOLUTION INTRAVENOUS; SUBCUTANEOUS at 12:00

## 2021-02-24 RX ADMIN — NEPHROCAP 1 MG: 1 CAP ORAL at 09:58

## 2021-02-24 RX ADMIN — RANOLAZINE 500 MG: 500 TABLET, FILM COATED, EXTENDED RELEASE ORAL at 09:57

## 2021-02-24 RX ADMIN — DIPHENHYDRAMINE HCL 25 MG: 25 TABLET ORAL at 12:01

## 2021-02-24 RX ADMIN — HEPARIN SODIUM 5000 UNITS: 5000 INJECTION INTRAVENOUS; SUBCUTANEOUS at 05:07

## 2021-02-24 RX ADMIN — ATORVASTATIN CALCIUM 40 MG: 40 TABLET, FILM COATED ORAL at 09:57

## 2021-02-24 RX ADMIN — ASPIRIN 81 MG CHEWABLE TABLET 81 MG: 81 TABLET CHEWABLE at 09:57

## 2021-02-24 RX ADMIN — PREGABALIN 75 MG: 75 CAPSULE ORAL at 09:58

## 2021-02-24 RX ADMIN — Medication 400 MG: at 09:57

## 2021-02-24 RX ADMIN — Medication 100 MCG: at 09:57

## 2021-02-24 NOTE — DISCHARGE SUMMARY
[address]  [address]  [place], [address] [facility] Buddy Cope  MRN: [id number]  YOB: 1960  59 y. o.female      Attending  Payton Barcenas MD ?   Date of Admission  2/16/2021 Date of Discharge  02/24/21      ? DIAGNOSES:  Principal Problem:    Contusion of right chest wall  Active Problems:    Controlled type 2 diabetes mellitus with diabetic neuropathy, with long-term current use of insulin ([facility])    Class 2 severe obesity with serious comorbidity and body mass index (BMI) of 36.0 to 36.9 in adult [facility])    End stage renal disease ([facility])    Chest wall contusion, left, initial encounter    Compression fracture of spine ([facility])    Closed rib fracture  Resolved Problems:    * No resolved hospital problems. *         PROCEDURES:  None  ?     DISCHARGE MEDICATIONS:    Wing Nathanjett   Home Medication Instructions UXC:[id number]    Printed on:02/24/21 1128   Medication Information                      acetaminophen (TYLENOL) 325 MG tablet  Take 2 tablets by mouth every 4 hours as needed for Pain (For mild to moderate pain (Pain 1-6 out of 10 on pain scale))             albuterol sulfate HFA (VENTOLIN HFA) 108 (90 Base) MCG/ACT inhaler  Inhale 2 puffs into the lungs every 6 hours as needed for Wheezing             amLODIPine (NORVASC) 10 MG tablet  TAKE 1 TABLET BY MOUTH DAILY             ARIPiprazole (ABILIFY) 5 MG tablet  TAKE 1 TABLET BY MOUTH ONCE DAILY             aspirin 81 MG tablet  Take 1 tablet by mouth daily             atorvastatin (LIPITOR) 40 MG tablet  Take 1 tablet by mouth daily             B Complex-C-Folic Acid (NEPHRO VITAMINS) 0.8 MG TABS  Take by mouth daily              Blood Glucose Monitoring Suppl (FREESTYLE LITE) CORETTA  1 Device by Does not apply route daily as needed (Diabetes) Use freestyle meter to test blood sugar as needed             blood glucose test strips (FREESTYLE LITE) strip  1 each by Does not apply route 4 times daily (before meals and nightly) As needed. BRILINTA 90 MG TABS tablet  TAKE 1 TABLET BY MOUTH 2 TIMES DAILY             docusate sodium (COLACE, DULCOLAX) 100 MG CAPS  Take 100 mg by mouth 2 times daily For the prevention and treatment of constipation while taking pain medications. fluticasone (FLOVENT HFA) 110 MCG/ACT inhaler  Inhale 2 puffs into the lungs 2 times daily             Heparin Sodium, Porcine, (HEPARIN, PORCINE,) 5000 UNIT/ML injection  Inject 1 mL into the skin every 8 hours For the prevention of blood clots in convalescent setting while increasing ambulation. insulin aspart (NOVOLOG FLEXPEN) 100 UNIT/ML injection pen  INJECT 6 units with each meals             isosorbide mononitrate (IMDUR) 60 MG extended release tablet  Take 1 tablet by mouth daily             LANTUS SOLOSTAR 100 UNIT/ML injection pen  Inject 45 Units into the skin nightly             magnesium oxide (MAG-OX) 400 MG tablet  Take 400 mg by mouth daily             melatonin 3 MG TABS tablet  TAKE 1 TABLET BY MOUTH EVERY NIGHT AT BEDTIME AS NEEDED FOR INSOMNIA             metoprolol tartrate (LOPRESSOR) 25 MG tablet  Take 0.5 tablets by mouth 2 times daily             Misc. Devices (BARIATRIC ROLLATOR) MISC  Rolllator with a basket             Mis. Devices [facility]  To use with transport outside of the house.              nitroGLYCERIN (NITROSTAT) 0.4 MG SL tablet  Place 1 tablet under the tongue every 5 minutes as needed for Chest pain             nystatin (NYAMYC) 222599 UNIT/GM powder  APPLY TO ABDOMINAL FOLDS EVERY 12 HOURS AS NEEDED             pantoprazole (PROTONIX) 20 MG tablet  TAKE 1 TABLET BY MOUTH DAILY             pregabalin (LYRICA) 75 MG capsule  TAKE ONE (1) CAPSULE BY MOUTH TWICE DAILY             ranolazine (RANEXA) 500 MG extended release tablet  Take 1 tablet by mouth 2 times daily             senna (SENOKOT) 8.6 MG tablet  Take 1 tablet by mouth nightly for 15 days For the prevention and treatment of constipation while taking pain medications. sertraline (ZOLOFT) 50 MG tablet  TAKE 1 TABLET BY MOUTH DAILY             SURE COMFORT PEN NEEDLES 30G X 8 MM MISC  USE AS DIRECTED FIVE TIMES A DAY             traMADol (ULTRAM) 50 MG tablet  Take 1 tablet by mouth every 6 hours as needed for Pain (For severe pain only. (Pain 7-10 out of 10 on pain scale)) for up to 7 days. triamcinolone (KENALOG) 0.1 % cream  APPLY TO AFFECTED AREAS TWICE DAILY AS DIRECTED             vitamin B-12 (CYANOCOBALAMIN) 100 MCG tablet  Take 1 tablet by mouth daily                    REASON FOR HOSPITALIZATION:  Michelle Le is a 59 y. o. female with PMH significant for CAD s/p stents (2015, 2018), ESRD on HD (T/Th/Sat), HTN, HLD, DM, Hepatitis C, GERD, COPD, seizures, anxiety and COVID-19.  She presented to the ED on 2/16/2021 after becoming weak and nearly falling while transferring from a wheelchair to a car. Chayo Hall denies falling.     Trauma work up found chronic R sided rib fractures as well as stable T11 compression fracture from prior 1/2021 imaging.  Initially there was concern that the rib fractures were new so the patient was admitted to the [facility] under the Trauma service. Nephrology and the inpatient hospitalist service was consulted. SIGNIFICANT FINDINGS:  Catalog of Injuries:   1. Weakness but no reported fall when transferring from wheelchair to car on 2/16/2021  2. Chronic T11 compression fracture  3. Chronic right 4-9 rib fractures  4. Generalized weakness  5. Acute pain due to rib fractures  6. COVID 19 (detected in 12/2020), PCR negative 2/24  7. History of ESRD     PMHx: CAD s/p stents (2015, 2018), ESRD on HD (T/Th/Sat), HTN, HLD, DM, Hepatitis C, GERD, COPD, seizures, anxiety and COVID-19.      Incidental Findings: (reviewed 2/17/2021 REBECCA, discussed with patient by Kilo May PA-C on 2/19/2021)  1. There is fatty atrophy of the pancreas.   2. Subtle nodularity of the liver suggests cirrhosis. HOSPITAL COURSE:  2/16/2021: Presented to ED after becoming weak while transferring from a wheelchair to a car.  Denies traumatic injury/fall. Admitted to the [facility] under the Trauma service. 2/18/2021: Medically cleared for discharge. Family and patient requesting rehab. SW consulted for assist with dispo planning. Pt underwent routine dialysis   2/19/2021: remained medically cleared, await placement. Covid PCR ordered d/t facility not able to accept patient without test  2/20/2021: Routine dialysis. CM contacted regarding d/c, lab without covid supplies to run test. Awaiting test. Remains medically cleared   2/21/2021: awaiting covid results, medially cleared   2/22/2021: No change in care plan  2/24/2021: Remains medically cleared for d/c. Reached 1200 on IS. PCR Covid negative. Discharged to SNF. At time of discharge, Rosey Gomez was tolerating a regular diabetic diet, tolerating regular dialysis schedule, and had adequate analgesia on oral pain medications. Pt's ambulating with stand by assist using Rolator. The patient had no signs or symptoms of complications. Patient was determined stable for discharge to: [address]    The patient was seen and examined on the day of discharge with the following findings:  Constitutional: Awake, alert, in no acute distress. Pleasant. Clear speech, and pt aware and verbalizes d/c plan. Cardiovascular: Regular rate and rhythm. Pulmonary: Clear to ausculation bilaterally. No wheezing, rhonchi or rales. Reaching 1200 on IS. Abdominal: Soft, non-tender, non-distended. Musculoskeletal: Healing ecchymosis to L upper arm. No tenderness. Extremity ROM intact. No calf tenderness or swelling. Neurological: Alert, awake, and orientated x3. Motor and sensory grossly intact. No focal deficits. GCS of 15. Skin: Warm and dry. Healing ecchymosis in L upper arm. Pinpoint lesions on L arm, pt scratched and eschar peeled off, scant bleeding. ANTICIPATED FOLLOW UP:  Future Appointments   Date Time Provider Aminata Bennettisti   3/3/2021 10:00 AM [facility]   3/11/2021  2:30 PM Tucker Dias MD [address] [place] [address]   5/4/2021 10:00 AM Jose Dubois MD [facility] [place]     No discharge procedures on file. Other indicated follow up and instructions for scheduling:    - Follow up with PCP for recent hospitalization, to discuss incidental findings and multiple medical co-morbidities  - Follow up with ENT (Dr. Guillermo Lawson) for sinusitis found on CT scan. Does not need routine Trauma follow up. VTE RISK AT DISCHARGE:  Per trauma program protocol, the patient does not require post-discharge VTE prophylaxis. Patient will continue VTE prophylaxis with Heparin 5,000 units while in SNF, and use may be discontinued per discretion of SNF provider. --  Luz Marquez PA-C  Trauma/Critical Care  Emergency General Surgery  [phone] ([phone]) [phone]    34 minutes were spent on the discharge of this patient including final examination of the patient, discussion of the hospital stay, instructions for continuing care to all relevant caregivers, preparation of discharge records, prescriptions and referral forms, and clear identification of reasons to return to clinic or to emergency room.

## 2021-02-24 NOTE — PROGRESS NOTES
Physical Therapy Med Surg Daily Treatment Note  Facility/Department: [address] [facility]  [facility]       NAME: Jhon Aguilar  : 1960 (26 y.o.)  MRN: [id number]  CODE STATUS: Full Code    Date of Service: 2021    Patient Diagnosis(es): Chest wall contusion, unspecified laterality, initial encounter [S20.219A]  Chest wall contusion, left, initial encounter Luis F Salcedo   Chief Complaint   Patient presents with    Extremity Weakness     Patient Active Problem List    Diagnosis Date Noted    [facility] spine [facility] 2021    Closed rib fracture 2021    Chest wall contusion, left, initial encounter 2021    Contusion of right chest wall 2021    Falls frequently 2021    Post PTCA     Headache, unspecified 2021    COVID-19 2020    Thrush 2020    Moderate persistent asthma without complication     Weakness 2020    Difficulty in walking 2020    Atherosclerotic heart disease of native coronary artery with unspecified angina pectoris ([address] 2020    Essential (primary) hypertension 2020    Pain, unspecified 2020    End stage renal disease ([address] 2020    Other seizures ([facility] 2020    Encounter for immunization 2020    Encounter for screening for respiratory tuberculosis 2020    Paranoid schizophrenia ([facility] 2020    Renal failure 2020    Recurrent falls 2020    Chest pain 2020    Edema 2019    Closed supracondylar fracture of right humerus 2019    Other chronic pain 2019    Palliative care patient 2019    Class 2 severe obesity with serious comorbidity and body mass index (BMI) of 36.0 to 36.9 in [facility]) 2019    Sleep apnea, unspecified 2019    Pulmonary hypertension, unspecified ([address] 2019    Acute diastolic (congestive) heart failure ([address] 10/04/2019    Uncontrolled type 2 diabetes mellitus with hyperglycemia ([address]     Shortness of breath 10/02/2019    Tardive dyskinesia 05/16/2019    Angina, class II ([address]     Controlled type 2 diabetes mellitus with diabetic neuropathy, with long-term current use of insulin ([address] 02/24/2017    Other specified diabetes mellitus with diabetic neuropathy, unspecified ([address] 08/04/2016    Stented coronary artery-plan is to stay on Plavix indefinately per Dr Tammy Johns 06/02/2016    History of seizures     Urinary incontinence due to cognitive impairment     History of type C viral hepatitis     Diabetic nephropathy with proteinuria ([address]     Incontinence of urine 04/07/2014    Vitamin D insufficiency 02/04/2014    Vitamin B 12 deficiency 06/05/2013    Cerebral microvascular disease 06/05/2013    Hemiparesis, left ([address] 01/01/2013    Schizophrenia, paranoid, chronic     Metabolic syndrome     Mixed hyperlipidemia 12/09/2010    Other hammer toe (acquired) 12/13/2007        Past Medical History:   Diagnosis Date    Anxiety     CAD S/P percutaneous coronary angioplasty 2015, 2018    stents per dr Milton Grace    CHF (congestive heart failure) ([address]     CKD (chronic kidney disease) stage 4, GFR 15-29 ml/min ([address] 2/24/2018    CKD stage 4 due to type 2 diabetes mellitus ([address]     COPD (chronic obstructive pulmonary disease) ([address]     Diabetic nephropathy with proteinuria ([address] 2014    DJD (degenerative joint disease) of knee     Dr Deyvi Flores GERD (gastroesophageal reflux disease)     Hemiparesis, left ([address] 2013    entered Assisted Living ([place])    Hemodialysis patient [facility])     History of heart failure     History of seizures     History of type C viral hepatitis     HTN (hypertension)     Hyperlipidemia     Impaired mobility and activities of daily living     Mediastinal lymphadenopathy 2013    Dr Sonnie Hashimoto, [facility]    Metabolic syndrome     Moderate persistent asthma without complication 0/77/6363    Neurogenic urinary incontinence 2013    Neuropathy in diabetes ([facility]     Obesity (BMI 30-39. 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 Jory Hale      x1 Dr. Fabio Smith, Dr Steven Pham CATH LAB PROCEDURE  10/02/2019    HYSTERECTOMY, TOTAL ABDOMINAL      one ovary intact, Dr Vivi River, menorrhagia    SC TOTAL KNEE ARTHROPLASTY Left 2018    LEFT KNEE TOTAL KNEE ARTHROPLASTY, SHAYNA, NERVE BLOCK performed by Opal Chu MD at [address] [place] [address] Right     TOTAL KNEE ARTHROPLASTY  16    Dr Cathy Patel TUNNELED [address] Right 2020    tunneled HD catheter per Dr Omayra Del Cid  Chart Reviewed: Yes  Response To Previous Treatment: Patient with no complaints from previous session. Family / Caregiver Present: No  Subjective  Subjective: \"I think I might be leaving today. \"    Pre-Session Pain Report     Pain Screening  Patient Currently in Pain: Denies  Post-Session Pain Report  Denies     OBJECTIVE   Bed mobility  Rolling to Left: Modified independent  Supine to Sit: Modified independent  Sit to Supine: Modified independent  Scooting: Modified independent  Comment: HOB flat, patient completes without bed rail    Transfers  Sit to Stand: Supervision  Stand to sit: Supervision    Ambulation  Ambulation?: Yes  Ambulation 1  Surface: level tile  Device: Rollator  Assistance: Stand by assistance  Quality of Gait: increased lateral sway, decreased jean, mild path deviations, 2 small LOB- patient able to self correct  Distance: 175 feet, 75 feet-1 seated RB due to fatigue     ASSESSMENT   Assessment: patient continues to demonstrate decreased awareness of environment/ ability to negotiate obstacles when ambulating. Mildly impulsive throughout session Fatigued post ambulation. Discharge Recommendations:  Continue to assess pending progress, Patient would benefit from continued therapy after discharge    Goals  Long term goals  Long term goal 1: Pt will demonstrate amb SBA with safest AD 50ft to allow pt to amb inside her home with safety. Long term goal 2: Pt will demonstrate transfers SBA with safest AD to increase pt safety with functional mobility. Long term goal 3: Pt will demonstrate TGUG > or = 13 sec indicating decreased risk for falls. Patient Goals   Patient goals : \"go home\"    PLAN    Times per week: 3-6  Plan Comment: Cont. POC  Safety Devices  Type of devices: Call light within reach, Left in bed,bed alarm on   [facility] (6 CLICK) BASIC MOBILITY  AM-PAC Inpatient Mobility Raw Score : 18     Therapy Time   Individual   Time In 1100   Time Out 1115   Minutes 15     Timed Code Treatment Minutes: 15 Minutes   gt 501 Alireza Agosto, PTA, 02/24/21 at 12:41 PM         Definitions for assistance levels  Independent = pt does not require any physical supervision or assistance from another person for activity completion. Device may be needed.   Stand by assistance = pt requires verbal cues or instructions from another person, close to but not touching, to perform the activity  Minimal assistance= pt performs 75% or more of the activity; assistance is required to complete the activity  Moderate assistance= pt performs 50% of the activity; assistance is required to complete the activity  Maximal assistance = pt performs 25% of the activity; assistance is required to complete the activity  Dependent = pt requires total physical assistance to accomplish the task

## 2021-02-24 NOTE — CARE COORDINATION
The LSW left a message for Sulema admissions at [facility], that the PCR test came back negative. Electronically signed by HIPOLITO Walker on 2/24/21 at 10:29 AM EST    Per ayanna Leone at [facility], she is setting up transport with the patient's insurance for dialysis. The LSW met with the patient and she verbally signed her IMM. The patient expressed an understanding of the medicare appeal process. The RN, , patient and physician are aware that the LSW awaits to hear back from [facility] regarding the transportation for dialysis. The RN recommends the patient transports to the SNF by cot today. Electronically signed by HIPOLITO Walker on 2/24/21 at 11:09 AM EST    The LSW set the discharge at 5 pm by cot (RN recommends cot due to recent fall) to [facility]. The LSW updated the patient's RN. The patient's RN states she talked to the patient's daughter earlier. The RN, Dustin Galo, states she will notify the patient and her daughter of the discharge time. Electronically signed by HIPOLITO Walker on 2/24/21 at 1:44 PM EST    [id number] sent. Landon Sevilla of [facility], notified of the discharge time for today.  Electronically signed by Yasmin Donahue on 2/24/21 at 1:45 PM EST Statement Selected

## 2021-02-24 NOTE — FLOWSHEET NOTE
Report given to Suraj Connolly at [facility]. Patient and daughter aware patient is being transferred today. Attempted to call daughter Neri Ramachandran again to tell her the  time but there was no answer. Electronically signed by Valarie Ignacio on 2/24/2021 at 3:08 PM

## 2021-02-24 NOTE — PROGRESS NOTES
Nephrology Progress Note    Assessment:  ESRDX  DM type-2  Hx Covid  Hx CVA  Rib fractures healing  OHDX CAD    Plan: dialysis today hope NH tomorrow    Patient Active Problem List:     Atherosclerotic heart disease of native coronary artery with unspecified angina pectoris (HCC)     Schizophrenia, paranoid, chronic     Metabolic syndrome     Vitamin B 12 deficiency     Cerebral microvascular disease     Mixed hyperlipidemia     Other hammer toe (acquired)     Vitamin D insufficiency     Incontinence of urine     Diabetic nephropathy with proteinuria ([address]     Essential (primary) hypertension     History of type C viral hepatitis     Urinary incontinence due to cognitive impairment     History of seizures     Stented coronary artery-plan is to stay on Plavix indefinately per Dr Ajay Diggs     Other specified diabetes mellitus with diabetic neuropathy, unspecified ([address]     Controlled type 2 diabetes mellitus with diabetic neuropathy, with long-term current use of insulin (HCC)     Hemiparesis, left (HCC)     Angina, class II ([address]     Pain, unspecified     Tardive dyskinesia     Shortness of breath     Uncontrolled type 2 diabetes mellitus with hyperglycemia (HCC)     Acute diastolic (congestive) heart failure (HCC)     Sleep apnea, unspecified     Pulmonary hypertension, unspecified (HCC)     Class 2 severe obesity with serious comorbidity and body mass index (BMI) of 36.0 to 36.9 in [facility])     Edema     Closed supracondylar fracture of right humerus     Other chronic pain     Palliative care patient     Chest pain     Recurrent falls     Renal failure     Difficulty in walking     End stage renal disease (HCC)     Weakness     Other seizures (HCC)     Moderate persistent asthma without complication     Thrush     COVID-19     Post PTCA     Falls frequently     Contusion of right chest wall     Chest wall contusion, left, initial encounter     Headache, unspecified     Encounter for immunization     Encounter for results for input(s): IRONS, FERRITIN in the last 72 hours. Invalid input(s): LABIRONS  Urinalysis: No results for input(s): UA in the last 72 hours.     Objective:  Vitals: BP (!) 99/49   Pulse 61   Temp 97.4 °F (36.3 °C) (Oral)   Resp 18   Ht 5' 7\" (1.702 m)   Wt 201 lb 8 oz (91.4 kg)   LMP  (LMP Unknown)   SpO2 100%   BMI 31.56 kg/m²    Wt Readings from Last 3 Encounters:   02/20/21 201 lb 8 oz (91.4 kg)   01/29/21 217 lb 1.6 oz (98.5 kg)   01/19/21 213 lb (96.6 kg)      24HR INTAKE/OUTPUT:  No intake or output data in the 24 hours ending 02/24/21 1757    General: alert, in no apparent distress  HEENT: normocephalic, atraumatic, anicteric  Neck: supple, no mass  Lungs: non-labored respirations, clear to auscultation bilaterally  Heart: regular rate and rhythm, no murmurs or rubs  Abdomen: soft, non-tender, non-distended  Ext: no cyanosis, no peripheral edema  Neuro: alert and oriented, no gross abnormalities  Psych: normal mood and affect  Skin: no rash      Electronically signed by Anthony Gomes MD

## 2021-02-24 NOTE — PROGRESS NOTES
Patient Name: Guido Muñiz  Date: 2/24/2021  YOB: 1960  Medical Record Number: [id number]              History of Present Illness:      Review of Systems    Review of Systems: All 14 review of systems negative other than as stated above    Social History     Tobacco Use    Smoking status: Passive Smoke Exposure - Never Smoker    Smokeless tobacco: Never Used   Substance Use Topics    Alcohol use: No     Alcohol/week: 0.0 standard drinks    Drug use: No         Past Medical History:   Diagnosis Date    Anxiety     CAD S/P percutaneous coronary angioplasty 2015, 2018    stents per dr Kaya Cheek    [facility]est[facility]) [facility]     CKD (chronic kidney disease) stage 4, GFR 15-29 ml/min ([facility] 2/24/2018    CKD stage 4 due to type 2 diabetes mellitus ([facility]     COPD (chronic obstructive pulmonary disease) ([facility]     Diabetic nephropathy with proteinuria ([facility] 2014    DJD (degenerative joint disease) of knee     Dr Minh Baron GERD (gastroesophageal reflux disease)     Hemiparesis, left ([facility] 2013    [facility] ([facility])    Hemodialysis patient [facility])     History of heart failure     History of seizures     History of type C viral hepatitis     HTN (hypertension)     Hyperlipidemia     Impaired mobility and activities of daily living     Mediastinal lymphadenopathy 2013    Dr Diaz [facility], [facility]    Metabolic syndrome     Moderate persistent asthma without complication 4/15/5559    Neurogenic urinary incontinence 2013    Neuropathy in diabetes ([facility]     Obesity (BMI 30-39. 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 Devices (BARIATRIC ROLLATOR) MISC Rolllator with a basket 1 each 0    Blood Glucose Monitoring Suppl (FREESTYLE LITE) CORETTA 1 Device by Does not apply route daily as needed (Diabetes) Use freestyle meter to test blood sugar as needed 1 Device 0    ranolazine (RANEXA) 500 MG extended release tablet Take 1 tablet by mouth 2 times daily 180 tablet 2    BRILINTA 90 MG TABS tablet TAKE 1 TABLET BY MOUTH 2 TIMES DAILY 60 tablet 11    pantoprazole (PROTONIX) 20 MG tablet TAKE 1 TABLET BY MOUTH DAILY 90 tablet 3    B Complex-C-Folic Acid (NEPHRO VITAMINS) 0.8 MG TABS Take by mouth daily       albuterol sulfate HFA (VENTOLIN HFA) 108 (90 Base) MCG/ACT inhaler Inhale 2 puffs into the lungs every 6 hours as needed for Wheezing      sertraline (ZOLOFT) 50 MG tablet TAKE 1 TABLET BY MOUTH DAILY 90 tablet 3    fluticasone (FLOVENT HFA) 110 MCG/ACT inhaler Inhale 2 puffs into the lungs 2 times daily (Patient taking differently: Inhale 2 puffs into the lungs 2 times daily as needed ) 1 Inhaler 12    [facility] [employer] [place] use with transport outside of the house.  1 each 0    isosorbide mononitrate (IMDUR) 60 MG extended release tablet Take 1 tablet by mouth daily 90 tablet 3    amLODIPine (NORVASC) 10 MG tablet TAKE 1 TABLET BY MOUTH DAILY 90 tablet 1    aspirin 81 MG tablet Take 1 tablet by mouth daily 90 tablet 3    atorvastatin (LIPITOR) 40 MG tablet Take 1 tablet by mouth daily 90 tablet 3    SURE COMFORT PEN NEEDLES 30G X 8 MM MISC USE AS DIRECTED FIVE TIMES A  each 10    nitroGLYCERIN (NITROSTAT) 0.4 MG SL tablet Place 1 tablet under the tongue every 5 minutes as needed for Chest pain      magnesium oxide (MAG-OX) 400 MG tablet Take 400 mg by mouth daily         Allergies   Allergen Reactions    Codeine Hives     hives    Oxycontin [Oxycodone Hcl] Hives         Family History   Problem Relation Age of Onset    Cancer Mother 76        survived   Duana Hark Hypertension Father  Diabetes Sister     Mental Illness Sister          Physical Exam:      Physical Exam    not currently breastfeeding.       .   Lab Results   Component Value Date    WBC 6.0 02/23/2021    HGB 10.4 (L) 02/23/2021    HCT 30.6 (L) 02/23/2021    MCV 89.2 02/23/2021     02/23/2021     Lab Results   Component Value Date     02/23/2021    K 4.5 02/23/2021    K 3.7 02/18/2021    CL 96 02/23/2021    CO2 23 02/23/2021    BUN 55 02/23/2021    CREATININE 6.51 02/23/2021    GLUCOSE 240 02/23/2021    GLUCOSE 102 10/16/2020    CALCIUM 9.3 02/23/2021                ASSESSMENT:  Patient Active Problem List   Diagnosis    Atherosclerotic heart disease of native coronary artery with unspecified angina pectoris (HCC)    Schizophrenia, paranoid, chronic    Metabolic syndrome    Vitamin B 12 deficiency    Cerebral microvascular disease    Mixed hyperlipidemia    Other hammer toe (acquired)    Vitamin D insufficiency    Incontinence of urine    Diabetic nephropathy with proteinuria ([address]    Essential (primary) hypertension    History of type C viral hepatitis    Urinary incontinence due to cognitive impairment    History of seizures    Stented coronary artery-plan is to stay on Plavix indefinately per Dr Sneha Galindo Other specified diabetes mellitus with diabetic neuropathy, unspecified ([address]    Hemiparesis, left ([address]    Angina, class II ([address]    Pain, unspecified    Tardive dyskinesia    Shortness of breath    Uncontrolled type 2 diabetes mellitus with hyperglycemia (HCC)    Acute diastolic (congestive) heart failure (HCC)    Sleep apnea, unspecified    Pulmonary hypertension, unspecified (HCC)    Class 2 severe obesity with serious comorbidity and body mass index (BMI) of 36.0 to 36.9 in adult ([address]    Edema    Closed supracondylar fracture of right humerus    Other chronic pain    Palliative care patient    Chest pain    Recurrent falls    Renal failure    Difficulty in walking  End stage renal disease ([facility]    Weakness    Other seizures (HCC)    Moderate persistent asthma without complication    Thrush    COVID-19    Post PTCA    Falls frequently    Contusion of right chest wall    Chest wall contusion, left, initial encounter    Headache, unspecified    Encounter for immunization    Encounter for screening for respiratory tuberculosis    Paranoid schizophrenia ([facility]    Compression fracture of spine ([facility]    Closed rib fracture         PLAN:     Diagnosis Orders   1. COVID-19     2. Unsteadiness     3. Other specified diabetes mellitus with diabetic neuropathy, unspecified ([facility]     4.  End stage renal disease ([facility]

## 2021-02-24 NOTE — DISCHARGE INSTR - DIET

## 2021-02-25 ENCOUNTER — CARE COORDINATION (OUTPATIENT)
Dept: CASE MANAGEMENT | Age: 61
End: 2021-02-25

## 2021-02-25 VITALS
WEIGHT: 218.2 LBS | OXYGEN SATURATION: 95 % | TEMPERATURE: 98.9 F | BODY MASS INDEX: 34.17 KG/M2 | SYSTOLIC BLOOD PRESSURE: 122 MMHG | HEART RATE: 79 BPM | RESPIRATION RATE: 18 BRPM | DIASTOLIC BLOOD PRESSURE: 68 MMHG

## 2021-02-25 ASSESSMENT — ENCOUNTER SYMPTOMS
NAUSEA: 0
SHORTNESS OF BREATH: 0
DIARRHEA: 0
COUGH: 0
VOMITING: 0

## 2021-02-25 NOTE — PROGRESS NOTES
PATIENTTorrendereck Asif : 1960 DOS: 2021     [address]  Chief Complaint   Patient presents with    Other     DC Summary       REASON FOR VISIT:  The patient is being seen today for discharge summary. Resident was originally admitted to this facility with primary diagnoses of weakness, fatigue, ESRD on hemodialysis, hypertension, depression, CAD, DM, COPD, and CHF. SUBJECTIVE:  Resident has completed her therapy and is now ready to go home. Nursing staff offer no new concerns. FAMILY AND SOCIAL HISTORY:  Refer to H&P. Past Medical History:   Diagnosis Date    Anxiety     CAD S/P percutaneous coronary angioplasty ,     stents per dr Madelyn Reza    CHF (congestive heart failure) ([facility]     CKD (chronic kidney disease) stage 4, GFR 15-29 ml/min ([address] 2018    CKD stage 4 due to type 2 diabetes mellitus ([address]     COPD (chronic obstructive pulmonary disease) ([address]     Diabetic nephropathy with proteinuria ([address]     DJD (degenerative joint disease) of knee     Dr Jaz Hubbard GERD (gastroesophageal reflux disease)     Hemiparesis, left ([facility] 2013    [facility] Ass[facility] ([facility])    Hemod[facility] patient [facility])     History of heart failure     History of seizures     History of type C viral hepatitis     HTN (hypertension)     Hyperlipidemia     Impaired mobility and activities of daily living     Mediastinal lymphadenopathy     Cara Gómez    Metabolic syndrome     Moderate persistent asthma without complication     Neurogenic urinary incontinence 2013    Neuropathy in diabetes ([address]     Obesity (BMI 30-39. 9)     Recurrent UTI     S/P colonoscopy     [facility], focal active colitis    Schizophrenia, paranoid, chronic ([address]     [facility]   [employer] vessel disease, cerebrovascular 2013    Status post total knee replacement, right     Status post total left knee replacement 2018 REVIEW OF SYSTEMS:  Resident denies any headache, dizziness, blurred vision, chest pain, shortness of breath, abdominal pain, nausea, vomiting, or changes in her bowel or bladder habits. She reports that she is happy, still interested in things, and looking forward to going home. Otherwise, no concerns. PHYSICAL EXAMINATION:  Most recent vital signs:  /68, temp 98.9, heart rate 79, respirations 18, pulse oxing 95% on room air, weight 218.2.  CONSTITUTIONAL:  Resident is alert, elderly female, in no apparent distress, pleasant and cooperative with exam.  INTEGUMENT:  Pink, warm, dry. HEENT:  Normocephalic, atraumatic in appearance. Conjunctivae pink. Sclerae nonicteric. Mucous membranes pink, moist.  No oropharyngeal exudates. NECK:  Supple. No cervical or clavicular lymphadenopathy. CARDIOVASCULAR:  Regular rate and rhythm. No murmurs, gallops, or rubs noted. She does have fistula in her right upper extremity with a good bruit and thrill. RESPIRATORY:  Lungs are clear throughout all fields. Respirations even, nonlabored. ABDOMEN:  Obese, soft, nontender, nondistended. Normoactive bowel sounds. PV:  Peripheral pulses present. No edema noted. ASSESSMENT AND PLAN:  1. Weakness, fatigue. Resident has completed her therapy. She is now ready to go home. 2.   ESRD on hemodialysis. Resident's BUN is 36, creatinine 5.1. She does follow with Dr. Lianne Asif. She will continue to follow with him at discharge. 3.   Hypertension. Resident's blood pressure is stable. She has not had any hypertensive or hypotensive events while at the facility. She will continue her antihypertensive medication as ordered. 4.   Depression. Resident's mood is stable. She reports that she is happy, still interested in doing things, and is looking forward to going home. 5.   CAD. Resident has not had any chest pain or chest discomfort. She will follow up with her PCP after discharge and continue her current medication regimen. 6.   Diabetes mellitus. Resident's blood sugars have been stable. Hemoglobin A1c 7.2. She will continue her current diabetic medication regimen. 7.   COPD. Resident's respiratory status is stable. She has not had any hypoxic episodes or episodes of respiratory distress. 8.   CHF. Resident is not exhibiting any signs or symptoms of fluid overload. I have reviewed this resident's medication and treatment plan, as well as, most recent lab work. No further changes will be made at this time. Return for 1-2 weeks with pcp. Electronically Signed By: Miller De Leon CNP on 02/24/2021 23:22:39  ______________________________  ETHAN Hale/[id number]  D: 02/22/2021 06:25:16  T: 02/22/2021 07:40:49    cc: - [facility]

## 2021-02-25 NOTE — PROGRESS NOTES
Progress Note  Date:2021       Room:[place] [id number]  Patient Name:Michelle Lawson     YOB: 1960     Age:61 y.o. Subjective    Subjective:  Symptoms:  No shortness of breath, malaise, cough, chest pain, weakness, headache, chest pressure, anorexia, diarrhea or anxiety. Diet:  No nausea or vomiting. Review of Systems   Respiratory: Negative for cough and shortness of breath. Cardiovascular: Negative for chest pain. Gastrointestinal: Negative for anorexia, diarrhea, nausea and vomiting. Neurological: Negative for weakness. Objective         Vitals Last 24 Hours:  TEMPERATURE:  Temp  Av.4 °F (36.3 °C)  Min: 97.4 °F (36.3 °C)  Max: 97.4 °F (36.3 °C)  RESPIRATIONS RANGE: Resp  Av  Min: 18  Max: 18  PULSE OXIMETRY RANGE: SpO2  Av %  Min: 100 %  Max: 100 %  PULSE RANGE: Pulse  Av  Min: 61  Max: 61  BLOOD PRESSURE RANGE: Systolic (69NJX), EFZ:18 , Min:99 , FWI:08   ; Diastolic (66FFG), WOV:64, Min:49, Max:49    I/O (24Hr): No intake or output data in the 24 hours ending 21 1046  Objective:  General Appearance:  Comfortable, well-appearing and in no acute distress. Vital signs: (most recent): Blood pressure (!) 99/49, pulse 61, temperature 97.4 °F (36.3 °C), temperature source Oral, resp. rate 18, height 5' 7\" (1.702 m), weight 201 lb 8 oz (91.4 kg), SpO2 100 %, not currently breastfeeding. HEENT: Normal HEENT exam.    Lungs:  Normal effort. Heart: Normal rate. Regular rhythm. S1 normal and S2 normal.    Abdomen: Abdomen is soft. Bowel sounds are normal.   There is no epigastric area tenderness. Neurological: Patient is alert and oriented to person, place and time. Pupils:  Pupils are equal, round, and reactive to light. Skin:  Warm.       Labs/Imaging/Diagnostics    Labs:  CBC:  Recent Labs     21  1236   WBC 6.0   RBC 3.43*   HGB 10.4*   HCT 30.6*   MCV 89.2   RDW 16.0*        CHEMISTRIES:  Recent Labs 02/23/21  1236 02/24/21  0611   * 135   K 4.5 3.7   CL 96 96   CO2 23 25   BUN 55* 31*   CREATININE 6.51* 4.49*   GLUCOSE 240* 83   MG  --  2.1     PT/INR:No results for input(s): PROTIME, INR in the last 72 hours. APTT:No results for input(s): APTT in the last 72 hours. LIVER PROFILE:No results for input(s): AST, ALT, BILIDIR, BILITOT, ALKPHOS in the last 72 hours. Imaging Last 24 Hours:  No results found. Assessment//Plan           Hospital Problems           Last Modified POA    * (Principal) Contusion of right chest wall 2/24/2021 Yes    Controlled type 2 diabetes mellitus with diabetic neuropathy, with long-term current use of insulin ([facility] 2/24/2021 Yes    Overview Signed 2/24/2017  2:31 PM by Monserrat Medina MD     stable off pioglitazone  to focus on a better diet         Related Goals    Blood Pressure < 140/90    Reduce calorie intake to 1500 calories per day    Blood Pressure < 140/90    Weight (lb) < 200    Exercise 3x per week (30 min per time)    Attending meditation / other stress management classes    Reduce calorie intake to 2000 calories per day    Blood Pressure < 140/90    Nutrition Plan    Activity Plan    Reduce calorie intake to 1600 calories per day    Class 2 severe obesity with serious comorbidity and body mass index (BMI) of 36.0 to 36.9 in adult [facility]) 2/24/2021 Yes    End stage renal disease ([facility] 2/19/2021 Yes    Chest wall contusion, left, initial encounter 2/18/2021 Yes    Compression fracture of spine ([facility] 2/19/2021 Yes    Closed rib fracture 2/24/2021 Yes        Assessment & Plan    2/22: Continue with maintenance hemodialysis. Patient diagnosed with Covid on the December does not need to be on this isolation. Patient is not shortness of breath does not have a cough or fever. Spoke with nursing. Patient chest pain is a lot better compare to before. Anemia of chronic dz, HTN stable  Awaiting for placement. 2/23: Patient waiting for pre-CERT. No overnight events. Off isolation. No new complaints. Pt needs to fu with PCP in 1 week, instructed the pt.  2/24: pt denies any needs, will sign off.   Electronically signed by Dee Dee Ortez MD on 2/22/21 at 2:29 PM EST

## 2021-02-26 ENCOUNTER — TELEPHONE (OUTPATIENT)
Dept: ADMINISTRATIVE | Age: 61
End: 2021-02-26

## 2021-02-26 PROBLEM — G62.81 CRITICAL ILLNESS POLYNEUROPATHY (HCC): Status: ACTIVE | Noted: 2021-02-26

## 2021-02-26 PROBLEM — F32.A DEPRESSION: Status: ACTIVE | Noted: 2021-02-26

## 2021-02-26 PROBLEM — J44.9 CHRONIC OBSTRUCTIVE PULMONARY DISEASE (HCC): Status: ACTIVE | Noted: 2021-02-26

## 2021-02-26 NOTE — PROGRESS NOTES
[facility]  [address] [place] [address], [address]    2/2/2021    Bre Marcial  is a 59 y.o. in the [place] being seen for a f/u of   Chief Complaint   Patient presents with    Follow-Up from Hospital     for weakness falls        HPI patient being seen for follow-up of rehab admission  She had a short hospitalization for weakness falling deconditioning after she had recently had a Covid infection  She is technically recuperated from Covid without any residual issues except for the weakness  She is a frequent fall or she had multiple falls and she had fractures of her limbs in the past  She has diabetes noncompliant she has neuropathy in the legs she also has ataxia and imbalance issues  She is recently been on intermittent hemodialysis for her continued worsening renal function  She states to me she is doing okay she is still making urine she urinates a few times a day very small amounts however  She feels like she has been safe when she is been up with her walker  Moving her bowels no problems with her bathroom privileges with a walker  She says she is not having any lower extremity edema any longer since has been on the intermittent hemodialysis her blood pressure and blood sugars are okay  Per the medical record her blood sugars are elevated especially lunch dinner at bedtime  She states her morning blood sugars are good and review of the records they are 180s to low 200s and she just did state that those are good numbers for her  Once again diabetic education was done with her as at her previous visits I did gated her that her first morning blood sugar should be below 120 postprandial blood sugar should be 200 and of the day blood sugars should be 180s  We reviewed carbohydrate intake carb counting carb amounts per meal, and carbohydrate foods They are not complaining of any un addressed pain issues. Have had no recent choking or dysphagia issues. NO agitation or unusual mental behavioral issues. Past Medical History:   Diagnosis Date    Anxiety     CAD S/P percutaneous coronary angioplasty ,     stents per dr Reinier Valencia    [facility]     CKD (chronic kidney disease) stage 4, GFR 15-29 ml/min ([facility] 2018    CKD stage 4 due to type 2 diabetes mellitus ([facility]     COPD (chronic obstructive pulmonary disease) ([facility]     Diabetic nephropathy with proteinuria ([address]     DJD (degenerative joint disease) of knee     Dr Gabrielle Birch GERD (gastroesophageal reflux disease)     Hemiparesis, left ([facility] 2013    [facility] ([facility]    Hemod[facility] patient [facility])     History of heart failure     History of seizures     History of type C viral hepatitis     HTN (hypertension)     Hyperlipidemia     Impaired mobility and activities of daily living     Mediastinal lymphadenopathy 2013    Yamini Sheehan    Metabolic syndrome     Moderate persistent asthma without complication 4223    Neurogenic urinary incontinence 2013    Neuropathy in diabetes ([address]     Obesity (BMI 30-39. 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 Ana Cristina Xiao Braulio Sweet      x1 Dr. Tammy Johns, Dr Brandt Noriega CATH LAB PROCEDURE  10/02/2019    HYSTERECTOMY, TOTAL ABDOMINAL      one ovary intact, Dr Pratibha Mims, menorrhagia    ND TOTAL KNEE ARTHROPLASTY Left 6/21/2018    LEFT KNEE TOTAL KNEE ARTHROPLASTY, SHAYNA, NERVE BLOCK performed by Kenia Donaldson MD at [address] [place] [address] TOE AMPUTATION Right     TOTAL KNEE ARTHROPLASTY  05/19/16    Dr Deyvi Flores [address]UN[address]ELED [address] Right 07/01/2020    tunneled HD catheter per Dr Karuna Herron     Family History   Problem Relation Age of Onset    Cancer Mother 76        survived   Swapnil Pummel Hypertension Father     Diabetes Sister     Mental Illness Sister      Social History     Socioeconomic History    Marital status:      Spouse name: Not on file    Number of children: 2    Years of education: Not on file    Highest education level: Not on file   Occupational History    Occupation: disabled   Social Needs    Financial resource strain: Not on file    Food insecurity     Worry: Not on file     Inability: Not on file   [employer] needs     Medical: Not on file     Non-medical: Not on file   Tobacco Use    Smoking status: Passive Smoke Exposure - Never Smoker    Smokeless tobacco: Never Used   Substance and Sexual Activity    Alcohol use: No     Alcohol/week: 0.0 standard drinks    Drug use: No    Sexual activity: Not Currently   Lifestyle    Physical activity     Days per week: Not on file     Minutes per session: Not on file    Stress: Not on file   Relationships    Social connections     Talks on phone: Not on file     Gets together: Not on file     Attends Church service: Not on file     Active member of club or organization: Not on file     Attends meetings of clubs or organizations: Not on file     Relationship status: Not on file    Intimate partner violence     Fear of current or ex partner: Not on file Emotionally abused: Not on file     Physically abused: Not on file     Forced sexual activity: Not on file   Other Topics Concern    Not on file   Social History Narrative    Born in [place], one of 5    Twin sister Reyes, very ill in 2018, [facility]    Moved to [facility], , 2 children, one son and one daughter    Worked at [employer], as a nurse's aide    Disabled due to mental illness    Lived at [employer], was discharged, returned to independent living in 2017 in the daughter's house and has adjusted well    One son and one daughter, live in the same house with patient, Angela Arenas pays the rent    [employer] reading ([employer]       Allergies: Codeine and Oxycontin [oxycodone hcl]  NF MEDICATIONS REVIEWED    ROS:   Constitutional: There are no reports of behavioral issues, change in appetite, fever. No bleeding issues. Up with walker  Respiratory: denies SOB, dyspnea, dyspnea on exertion, change in exercise capacity  Cardiovascular: denies CP, lightheadedness, palpitations, PND, orthopnea, or claudication. GI: No N/V/D. : no reports of dysuria, no incontinent of urine   Extremities: No reports of pain issues, edema  Psych: at baseline lucid but forgetful, at baseline     Physical exam:   Weight 217 pounds   Blood pressure 132/74   Temperature 98   Pulse 78   Respiratory 16   97% room air   constitutional: Awake and alert sitting up in lounge chair, general weakness  HEENT: Normocephalic, intact facial symmetry, EOMs intact, sclera non icteric, pupils are equal and round, buccal mucosa pink and moist  Speech clear . NECK: - carotid bruit, euthyroid, no mass visualized  Cardiovascular: Regular rate S1S2 normal, NO S3S4  Respiratory: LCTA, even unlabored respirations, no accessory muscle use noted  GI: obese abdomen NT, +BS x 4 Q, ND  : continent of urine  Extremities: no ankle edema, PPP  SKELETAL: no redness, or swelling over joints.  Limited ROM but spontaneous movement x 4 Psych: pleasant. needs re-directed,  good judgement, normal thought content  SKIN: no gross skin lesions noted on visualized skin, warm and dry    ASSESSMENT:     Diagnosis Orders   1. Weakness     2. Schizophrenia, paranoid, chronic ([address]     3. Hemiparesis, left ([address]     4. Critical illness polyneuropathy ([facility]         PLAN:  1.  PT OT rehabilitation  2. Stable none behavior at this time she has no paranoia she is not having any schizoid behaviors  3. Hemiparesis and weakness do lead to falls and ataxia  4. Polyneuropathy also adds to her ataxic weakness falls she has diabetes is not controlled she is not very compliant with carbohydrate intake  She is on sliding scale #1 we will add 9 units Novolog to each meal and will continue to follow  BMP CBC as needed weekly  adhere to the JNC VIII guidelines for HTN management and the NCEP ATP III guidelines for LDL-C management. Return if symptoms worsen or fail to improve. Pertinent POC, medications, labs, have been reviewed, continue same. Encourage fluids and good nutrition. Stress fall prevention strategies. Timed Visit  Time in 9am Time out 1005am  with the patient, direct face to face patient contact at the bedside, and on the patient's unit for multiple complex medical problem for diagnosis of weakness from Covid, her new intermittent hemodialysis and its effects on her urine output and blood sugars, uncontrolled diabetes, low carbohydrate diet consistent carbohydrates, signs and symptoms of high and low blood sugar, review of abnormal lab results, diagnosis possibilities, treatment options, risks and benefits, reviewing NF, hospital charts, labs; discussion with the  Pt, therapists and nursing regarding rehabilitation  POC and services.       Monalisa Portillo, LORETO-CNP      Monalisa Portillo, DNP, MSN, RN, GNP-BC, NP-C  Adult/Toni Nurse Practitioner  [facility]  Department of Geriatrics  8:30am-4:30pm   [phone] After hours Answering service [phone]      Please note this report is partially produced by using speech recognition hardware. It may contain errors related to the system, including grammar, punctuation and spelling as well as words and phrases that may seem inaccurate.   For any questions or concerns feel free to contact me for clarification

## 2021-02-28 NOTE — PROGRESS NOTES
Patient Name: Olga Lawson  Date: 2/27/2021  YOB: 1960  Medical Record Number: [id number]              History of Present Illness:      Review of Systems    Review of Systems: All 14 review of systems negative other than as stated above    Social History     Tobacco Use    Smoking status: Passive Smoke Exposure - Never Smoker    Smokeless tobacco: Never Used   Substance Use Topics    Alcohol use: No     Alcohol/week: 0.0 standard drinks    Drug use: No         Past Medical History:   Diagnosis Date    Anxiety     CAD S/P percutaneous coronary angioplasty 2015, 2018    stents per dr Joshua Correa    [facility]     CKD (chronic kidney disease) stage 4, GFR 15-29 ml/min ([facility] 2/24/2018    CKD stage 4 due to type 2 diabetes mellitus ([facility]     COPD (chronic obstructive pulmonary disease) ([facility]     Diabetic nephropathy with proteinuria ([facility] 2014    DJD (degenerative joint disease) of knee     Dr Ziggy Yoo GERD (gastroesophageal reflux disease)     Hemiparesis, left ([facility] 2013    [facility] ([facility]    Hemod[facility] patient [facility])     History of heart failure     History of seizures     History of type C viral hepatitis     HTN (hypertension)     Hyperlipidemia     Impaired mobility and activities of daily living     Mediastinal lymphadenopathy 2013    Bradly Keys Ciro Postin    Metabolic syndrome     Moderate persistent asthma without complication 9/45/5561    Neurogenic urinary incontinence 2013    Neuropathy in diabetes ([facility]     Obesity (BMI 30-39. 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 Emma Wilkins      x1 Dr. Citlaly Cristobal, Dr Vickey Bustillos CATH LAB PROCEDURE  10/02/2019    HYSTERECTOMY, TOTAL ABDOMINAL      one ovary intact, Dr Miguel Del Angel, menorrhagia    ID TOTAL KNEE ARTHROPLASTY Left 2018    LEFT KNEE TOTAL KNEE ARTHROPLASTY, SHAYNA, NERVE BLOCK performed by Ervin Garcia MD at [address] [place] [address] Right     TOTAL KNEE ARTHROPLASTY  16    Dr Gomez Divers TUNNELED [address] Right 2020    tunneled HD catheter per Dr José Miguel Trivedi Medications on File Prior to Visit   Medication Sig Dispense Refill    triamcinolone (KENALOG) 0.1 % cream APPLY TO AFFECTED AREAS TWICE DAILY AS DIRECTED 80 g 10    nystatin (NYAMYC) 048981 UNIT/GM powder APPLY TO ABDOMINAL FOLDS EVERY 12 HOURS AS NEEDED 60 g 10    blood glucose test strips (FREESTYLE LITE) strip 1 each by Does not apply route 4 times daily (before meals and nightly) As needed. 200 strip 5    vitamin B-12 (CYANOCOBALAMIN) 100 MCG tablet Take 1 tablet by mouth daily (Patient taking differently: Take 100 mcg by mouth daily At night) 30 tablet 10    insulin aspart (NOVOLOG FLEXPEN) 100 UNIT/ML injection pen INJECT 6 units with each meals 15 mL 10    LANTUS SOLOSTAR 100 UNIT/ML injection pen Inject 45 Units into the skin nightly 15 mL 10    metoprolol tartrate (LOPRESSOR) 25 MG tablet Take 0.5 tablets by mouth 2 times daily 30 tablet 1    Misc.  Devices (BARIATRIC ROLLATOR) MISC Rolllator with a basket 1 each 0  Blood Glucose Monitoring Suppl (FREESTYLE LITE) CORETTA 1 Device by Does not apply route daily as needed (Diabetes) Use freestyle meter to test blood sugar as needed 1 Device 0    ranolazine (RANEXA) 500 MG extended release tablet Take 1 tablet by mouth 2 times daily 180 tablet 2    BRILINTA 90 MG TABS tablet TAKE 1 TABLET BY MOUTH 2 TIMES DAILY 60 tablet 11    pantoprazole (PROTONIX) 20 MG tablet TAKE 1 TABLET BY MOUTH DAILY 90 tablet 3    B Complex-C-Folic Acid (NEPHRO VITAMINS) 0.8 MG TABS Take 1 tablet by mouth daily       albuterol sulfate HFA (VENTOLIN HFA) 108 (90 Base) MCG/ACT inhaler Inhale 2 puffs into the lungs every 6 hours as needed for Wheezing      sertraline (ZOLOFT) 50 MG tablet TAKE 1 TABLET BY MOUTH DAILY 90 tablet 3    fluticasone (FLOVENT HFA) 110 MCG/ACT inhaler Inhale 2 puffs into the lungs 2 times daily 1 Inhaler 12    Misc. Devices [facility]) MISC To use with transport outside of the house. 1 each 0    isosorbide mononitrate (IMDUR) 60 MG extended release tablet Take 1 tablet by mouth daily 90 tablet 3    amLODIPine (NORVASC) 10 MG tablet TAKE 1 TABLET BY MOUTH DAILY 90 tablet 1    aspirin 81 MG tablet Take 1 tablet by mouth daily 90 tablet 3    atorvastatin (LIPITOR) 40 MG tablet Take 1 tablet by mouth daily 90 tablet 3    SURE COMFORT PEN NEEDLES 30G X 8 MM MISC USE AS DIRECTED FIVE TIMES A  each 10    nitroGLYCERIN (NITROSTAT) 0.4 MG SL tablet Place 1 tablet under the tongue every 5 minutes as needed for Chest pain      magnesium oxide (MAG-OX) 400 MG tablet Take 400 mg by mouth daily       No current facility-administered medications on file prior to visit.         Allergies   Allergen Reactions    Codeine Hives     hives    Oxycontin [Oxycodone Hcl] Hives         Family History   Problem Relation Age of Onset   [employer] Officer Cancer Mother 76        survived   [employer] Hypertension Father     Diabetes Sister     Mental Illness Sister          Physical Exam:      Physical Exam not currently breastfeeding.       .   Lab Results   Component Value Date    WBC 6.0 02/23/2021    HGB 10.4 (L) 02/23/2021    HCT 30.6 (L) 02/23/2021    MCV 89.2 02/23/2021     02/23/2021     Lab Results   Component Value Date     02/24/2021    K 3.7 02/24/2021    K 3.7 02/18/2021    CL 96 02/24/2021    CO2 25 02/24/2021    BUN 31 02/24/2021    CREATININE 4.49 02/24/2021    GLUCOSE 83 02/24/2021    GLUCOSE 102 10/16/2020    CALCIUM 9.4 02/24/2021                ASSESSMENT:  Patient Active Problem List   Diagnosis    Atherosclerotic heart disease of native coronary artery with unspecified angina pectoris (HCC)    Schizophrenia, paranoid, chronic    Metabolic syndrome    Vitamin B 12 deficiency    Cerebral microvascular disease    Mixed hyperlipidemia    Other hammer toe (acquired)    Vitamin D insufficiency    Incontinence of urine    Diabetic nephropathy with proteinuria ([address]    Essential (primary) hypertension    History of type C viral hepatitis    Urinary incontinence due to cognitive impairment    History of seizures    Stented coronary artery-plan is to stay on Plavix indefinately per Dr Watson Padron Other specified diabetes mellitus with diabetic neuropathy, unspecified ([address]    Controlled type 2 diabetes mellitus with diabetic neuropathy, with long-term current use of insulin (HCC)    Hemiparesis, left (HCC)    Angina, class II ([address]    Pain, unspecified    Tardive dyskinesia    Shortness of breath    Uncontrolled type 2 diabetes mellitus with hyperglycemia (HCC)    Acute diastolic (congestive) heart failure (HCC)    Sleep apnea, unspecified    Pulmonary hypertension, unspecified (HCC)    Class 2 severe obesity with serious comorbidity and body mass index (BMI) of 36.0 to 36.9 in adult ([address]    Edema    Closed supracondylar fracture of right humerus    Other chronic pain    Palliative care patient    Chest pain    Recurrent falls    Renal failure  Difficulty in walking    End stage renal disease (HCC)    Weakness    Other seizures (HCC)    Moderate persistent asthma without complication    Thrush    COVID-19    Post PTCA    Falls frequently    Contusion of right chest wall    Chest wall contusion, left, initial encounter    Headache, unspecified    Encounter for immunization    Encounter for screening for respiratory tuberculosis    Paranoid schizophrenia ([address]    Compression fracture of spine ([address]    Closed rib fracture    Depression    Chronic obstructive pulmonary disease ([address]    Critical illness polyneuropathy ([address]         PLAN:

## 2021-03-02 DIAGNOSIS — S20.212A CHEST WALL CONTUSION, LEFT, INITIAL ENCOUNTER: ICD-10-CM

## 2021-03-02 DIAGNOSIS — S20.211A CONTUSION OF RIGHT CHEST WALL, INITIAL ENCOUNTER: ICD-10-CM

## 2021-03-02 DIAGNOSIS — S22.41XA CLOSED FRACTURE OF MULTIPLE RIBS OF RIGHT SIDE, INITIAL ENCOUNTER: ICD-10-CM

## 2021-03-02 LAB
ANION GAP SERPL CALCULATED.3IONS-SCNC: 13 MEQ/L (ref 9–15)
BUN BLDV-MCNC: 34 MG/DL (ref 8–23)
CALCIUM SERPL-MCNC: 9.9 MG/DL (ref 8.5–9.9)
CHLORIDE BLD-SCNC: 96 MEQ/L (ref 95–107)
CO2: 26 MEQ/L (ref 20–31)
CREAT SERPL-MCNC: 4.59 MG/DL (ref 0.5–0.9)
GFR AFRICAN AMERICAN: 11.7
GFR NON-AFRICAN AMERICAN: 9.6
GLUCOSE BLD-MCNC: 183 MG/DL (ref 70–99)
HBA1C MFR BLD: 6.5 % (ref 4.8–5.9)
HCT VFR BLD CALC: 31.3 % (ref 37–47)
HEMOGLOBIN: 10.5 G/DL (ref 12–16)
MCH RBC QN AUTO: 30.3 PG (ref 27–31.3)
MCHC RBC AUTO-ENTMCNC: 33.6 % (ref 33–37)
MCV RBC AUTO: 90 FL (ref 82–100)
PDW BLD-RTO: 16 % (ref 11.5–14.5)
PLATELET # BLD: 125 K/UL (ref 130–400)
POTASSIUM SERPL-SCNC: 4.1 MEQ/L (ref 3.4–4.9)
RBC # BLD: 3.47 M/UL (ref 4.2–5.4)
SODIUM BLD-SCNC: 135 MEQ/L (ref 135–144)
WBC # BLD: 5.2 K/UL (ref 4.8–10.8)

## 2021-03-02 RX ORDER — TRAMADOL HYDROCHLORIDE 50 MG/1
50 TABLET ORAL EVERY 6 HOURS PRN
Qty: 56 TABLET | Refills: 0 | Status: SHIPPED | OUTPATIENT
Start: 2021-03-02 | End: 2021-03-09 | Stop reason: SDUPTHER

## 2021-03-05 ENCOUNTER — OFFICE VISIT (OUTPATIENT)
Dept: GERIATRIC MEDICINE | Age: 61
End: 2021-03-05
Payer: MEDICARE

## 2021-03-05 ENCOUNTER — OFFICE VISIT (OUTPATIENT)
Dept: ENT CLINIC | Age: 61
End: 2021-03-05
Payer: MEDICARE

## 2021-03-05 VITALS
SYSTOLIC BLOOD PRESSURE: 120 MMHG | OXYGEN SATURATION: 99 % | HEART RATE: 65 BPM | TEMPERATURE: 97.7 F | DIASTOLIC BLOOD PRESSURE: 60 MMHG

## 2021-03-05 DIAGNOSIS — J06.9 URI WITH COUGH AND CONGESTION: Primary | ICD-10-CM

## 2021-03-05 DIAGNOSIS — R29.6 FALLS FREQUENTLY: ICD-10-CM

## 2021-03-05 DIAGNOSIS — Z79.4 CONTROLLED TYPE 2 DIABETES MELLITUS WITH DIABETIC NEUROPATHY, WITH LONG-TERM CURRENT USE OF INSULIN (HCC): ICD-10-CM

## 2021-03-05 DIAGNOSIS — E11.40 CONTROLLED TYPE 2 DIABETES MELLITUS WITH DIABETIC NEUROPATHY, WITH LONG-TERM CURRENT USE OF INSULIN (HCC): ICD-10-CM

## 2021-03-05 DIAGNOSIS — S22.41XA CLOSED FRACTURE OF MULTIPLE RIBS OF RIGHT SIDE, INITIAL ENCOUNTER: ICD-10-CM

## 2021-03-05 DIAGNOSIS — S42.411D CLOSED SUPRACONDYLAR FRACTURE OF RIGHT HUMERUS WITH ROUTINE HEALING, SUBSEQUENT ENCOUNTER: Primary | ICD-10-CM

## 2021-03-05 DIAGNOSIS — J44.9 CHRONIC OBSTRUCTIVE PULMONARY DISEASE, UNSPECIFIED COPD TYPE (HCC): ICD-10-CM

## 2021-03-05 PROCEDURE — G8484 FLU IMMUNIZE NO ADMIN: HCPCS | Performed by: NURSE PRACTITIONER

## 2021-03-05 PROCEDURE — 99308 SBSQ NF CARE LOW MDM 20: CPT | Performed by: NURSE PRACTITIONER

## 2021-03-05 PROCEDURE — 99203 OFFICE O/P NEW LOW 30 MIN: CPT | Performed by: OTOLARYNGOLOGY

## 2021-03-05 PROCEDURE — 3044F HG A1C LEVEL LT 7.0%: CPT | Performed by: NURSE PRACTITIONER

## 2021-03-05 ASSESSMENT — ENCOUNTER SYMPTOMS
SINUS PAIN: 0
COLOR CHANGE: 0
SORE THROAT: 0
PHOTOPHOBIA: 0
CHEST TIGHTNESS: 0
WHEEZING: 0
VOICE CHANGE: 0
APNEA: 0
SINUS PRESSURE: 0
TROUBLE SWALLOWING: 0
FACIAL SWELLING: 0
CHOKING: 0
STRIDOR: 0
SHORTNESS OF BREATH: 0
EYE PAIN: 0
RHINORRHEA: 0
COUGH: 0

## 2021-03-05 NOTE — PROGRESS NOTES
Subjective:      Patient ID: Paola Talbert is a 59 y.o. female who presents today for:  Chief Complaint   Patient presents with    Sinus Problem     MASTOIDITIS       HPIpatient brought from Nursing home with an allege sinusitis. NO documentation of the symtoms patient denies any clinical signs nor symptoms    Past Medical History:   Diagnosis Date    Anxiety     CAD S/P percutaneous coronary angioplasty 2015, 2018    stents per dr Leslie Vega    CHF (congestive heart failure) ([address]     CKD (chronic kidney disease) stage 4, GFR 15-29 ml/min ([address] 2/24/2018    CKD stage 4 due to type 2 diabetes mellitus ([address]     COPD (chronic obstructive pulmonary disease) ([address]     Diabetic nephropathy with proteinuria ([address] 2014    DJD (degenerative joint disease) of knee     Dr Jazmine Helms GERD (gastroesophageal reflux disease)     Hemiparesis, left ([address] 2013    entered Assisted Living ([facility])    Hemodialysis patient [facility])     History of heart failure     History of seizures     History of type C viral hepatitis     HTN (hypertension)     Hyperlipidemia     Impaired mobility and activities of daily living     Mediastinal lymphadenopathy 2013    Marilia Delgado    Metabolic syndrome     Moderate persistent asthma without complication 6/04/8102    Neurogenic urinary incontinence 2013    Neuropathy in diabetes ([address]     Obesity (BMI 30-39. 9)     Recurrent UTI     S/P colonoscopy 2014    [facility], focal active colitis    Schizophrenia, paranoid, chronic ([address]     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 Avilez Math      x1 Dr. Jenn Casey, Dr Claudette Mc CATH LAB PROCEDURE  10/02/2019    HYSTERECTOMY, TOTAL ABDOMINAL      one ovary intact, Dr Scarlet Bonilla, menorrhagia    [place] TOTAL KNEE ARTHROPLASTY Left 2018    LEFT KNEE TOTAL KNEE ARTHROPLASTY, SHAYNA, NERVE BLOCK performed by Rufina Sellers MD at [address] [place] [address] Right     TOTAL KNEE ARTHROPLASTY  16    Dr Bobbi Crespo TUNNELED [address] Right 2020    tunneled HD catheter per Dr Gabi Machuca History    Marital status:      Spouse name: Not on file    Number of children: 2    Years of education: Not on file    Highest education level: Not on file   Occupational History    Occupation: disabled   Social Needs    Financial resource strain: Not on file    Food insecurity     Worry: Not on file     Inability: Not on file   [employer] needs     Medical: Not on file     Non-medical: Not on file   Tobacco Use    Smoking status: Passive Smoke Exposure - Never Smoker    Smokeless tobacco: Never Used   Substance and Sexual Activity    Alcohol use: No     Alcohol/week: 0.0 standard drinks    Drug use: No    Sexual activity: Not Currently   Lifestyle    Physical activity     Days per week: Not on file     Minutes per session: Not on file    Stress: Not on file   Relationships    Social connections     Talks on phone: Not on file     Gets together: Not on file     Attends Holiness service: Not on file     Active member of club or organization: Not on file     Attends meetings of clubs or organizations: Not on file     Relationship status: Not on file    Intimate partner violence     Fear of current or ex partner: Not on file     Emotionally abused: Not on file Physically abused: Not on file     Forced sexual activity: Not on file   Other Topics Concern    Not on file   Social History Narrative    Born in [place], one of 5    Twin sister Batsheva Tamayo, very ill in 2018, [place] [address]    Moved to [facility], , 2 children, one son and one daughter    Worked at [employer], as a nurse's aide    Disabled due to mental illness    Lived at [facility], was discharged, returned to independent living in 2017 in the daughter's house and has adjusted well    One son and one daughter, live in the same house with patient, Luke Rodriguez pays the rent    [employer] reading ([employer])     Family History   Problem Relation Age of Onset    Cancer Mother 76        survived   Demetra Chen Hypertension Father     Diabetes Sister     Mental Illness Sister      Allergies   Allergen Reactions    Codeine Hives     hives    Oxycontin [Oxycodone Hcl] Hives         Review of Systems   Constitutional: Negative for appetite change, chills, fatigue and fever. HENT: Negative for congestion, dental problem, drooling, ear discharge, ear pain, facial swelling, hearing loss, mouth sores, nosebleeds, postnasal drip, rhinorrhea, sinus pressure, sinus pain, sneezing, sore throat, tinnitus, trouble swallowing and voice change. Eyes: Negative for photophobia, pain and visual disturbance. Respiratory: Negative for apnea, cough, choking, chest tightness, shortness of breath, wheezing and stridor. Cardiovascular: Negative for chest pain and palpitations. Musculoskeletal: Negative for joint swelling, myalgias, neck pain and neck stiffness. Skin: Negative for color change, pallor, rash and wound. Allergic/Immunologic: Negative for environmental allergies and food allergies. Neurological: Negative for dizziness, tremors, seizures, syncope, facial asymmetry, speech difficulty, weakness, light-headedness, numbness and headaches. Hematological: Negative for adenopathy.        Objective: /60 (Site: Left Upper Arm, Position: Sitting, Cuff Size: Large Adult)   Pulse 65   Temp 97.7 °F (36.5 °C) (Oral)   LMP  (LMP Unknown)   SpO2 99%     Physical Exam  Constitutional:       General: She is not in acute distress. Appearance: Normal appearance. She is normal weight. She is not ill-appearing, toxic-appearing or diaphoretic. HENT:      Head: Normocephalic. No raccoon eyes, Gee's sign, abrasion, contusion, masses, right periorbital erythema, left periorbital erythema or laceration. Hair is normal.      Jaw: No trismus, tenderness, swelling, pain on movement or malocclusion. Salivary Glands: Right salivary gland is not diffusely enlarged or tender. Left salivary gland is not diffusely enlarged or tender. Comments: No mastoiditis     Right Ear: Hearing, tympanic membrane, ear canal and external ear normal. No decreased hearing noted. No laceration, drainage, swelling or tenderness. No middle ear effusion. There is no impacted cerumen. No foreign body. No mastoid tenderness. No PE tube. No hemotympanum. Tympanic membrane is not injected, scarred, perforated, erythematous, retracted or bulging. Tympanic membrane has normal mobility. Left Ear: Hearing, tympanic membrane, ear canal and external ear normal. No decreased hearing noted. No laceration, drainage, swelling or tenderness. No middle ear effusion. There is no impacted cerumen. No foreign body. No mastoid tenderness. No PE tube. No hemotympanum. Tympanic membrane is not injected, scarred, perforated, erythematous, retracted or bulging. Tympanic membrane has normal mobility. Nose: Nose normal. No nasal deformity, septal deviation, signs of injury, laceration, nasal tenderness, mucosal edema, congestion or rhinorrhea. Right Nostril: No foreign body, epistaxis, septal hematoma or occlusion. Left Nostril: No foreign body, epistaxis, septal hematoma or occlusion. Right Turbinates: Not enlarged, swollen or pale. Left Turbinates: Not enlarged, swollen or pale. Right Sinus: No maxillary sinus tenderness or frontal sinus tenderness. Left Sinus: No maxillary sinus tenderness or frontal sinus tenderness. Mouth/Throat:      Lips: Pink. No lesions. Mouth: Mucous membranes are moist. No injury, lacerations, oral lesions or angioedema. Dentition: Does not have dentures. No dental tenderness, gingival swelling, dental caries, dental abscesses or gum lesions. Tongue: No lesions. Tongue does not deviate from midline. Palate: No mass and lesions. Pharynx: Oropharynx is clear. No pharyngeal swelling, oropharyngeal exudate, posterior oropharyngeal erythema or uvula swelling. Tonsils: No tonsillar exudate or tonsillar abscesses. Eyes:      General: No scleral icterus. Right eye: No discharge. Left eye: No discharge. Extraocular Movements: Extraocular movements intact. Conjunctiva/sclera: Conjunctivae normal.      Pupils: Pupils are equal, round, and reactive to light. Neck:      Musculoskeletal: No neck rigidity or muscular tenderness. Vascular: No carotid bruit. Musculoskeletal:         General: No swelling, tenderness, deformity or signs of injury. Lymphadenopathy:      Cervical: No cervical adenopathy. Skin:     General: Skin is warm and dry. Coloration: Skin is not jaundiced or pale. Findings: No abrasion, abscess, acne, bruising, ecchymosis, erythema, signs of injury, laceration, lesion, petechiae, rash or wound. Rash is not crusting, macular, nodular, papular, purpuric, pustular, scaling, urticarial or vesicular. Neurological:      Mental Status: She is alert. Cranial Nerves: No cranial nerve deficit. Sensory: No sensory deficit. Motor: No weakness.       Coordination: Coordination normal.         Assessment:       Diagnosis Orders 1. URI with cough and congestion           Plan:      No orders of the defined types were placed in this encounter. No orders of the defined types were placed in this encounter. Return if symptoms worsen or fail to improve.       Sam Melchor MD

## 2021-03-09 DIAGNOSIS — S20.211A CONTUSION OF RIGHT CHEST WALL, INITIAL ENCOUNTER: ICD-10-CM

## 2021-03-09 DIAGNOSIS — S22.41XA CLOSED FRACTURE OF MULTIPLE RIBS OF RIGHT SIDE, INITIAL ENCOUNTER: ICD-10-CM

## 2021-03-09 DIAGNOSIS — S20.212A CHEST WALL CONTUSION, LEFT, INITIAL ENCOUNTER: ICD-10-CM

## 2021-03-09 RX ORDER — TRAMADOL HYDROCHLORIDE 50 MG/1
50 TABLET ORAL EVERY 6 HOURS PRN
Qty: 56 TABLET | Refills: 0 | Status: SHIPPED | OUTPATIENT
Start: 2021-03-09 | End: 2021-03-22 | Stop reason: ALTCHOICE

## 2021-03-11 ENCOUNTER — TELEPHONE (OUTPATIENT)
Dept: ENDOCRINOLOGY | Age: 61
End: 2021-03-11

## 2021-03-11 RX ORDER — LANCETS 28 GAUGE
EACH MISCELLANEOUS
Qty: 150 EACH | Refills: 3 | Status: SHIPPED | OUTPATIENT
Start: 2021-03-11

## 2021-03-12 RX ORDER — ISOPROPYL ALCOHOL 70 ML/100ML
SWAB TOPICAL
Qty: 100 EACH | Refills: 3 | Status: SHIPPED | OUTPATIENT
Start: 2021-03-12

## 2021-03-12 RX ORDER — BLOOD-GLUCOSE METER
1 KIT MISCELLANEOUS
Qty: 200 STRIP | Refills: 5 | Status: SHIPPED | OUTPATIENT
Start: 2021-03-12

## 2021-03-12 RX ORDER — PEN NEEDLE, DIABETIC 30 GX5/16"
NEEDLE, DISPOSABLE MISCELLANEOUS
Qty: 100 EACH | Refills: 10 | Status: SHIPPED | OUTPATIENT
Start: 2021-03-12 | End: 2021-04-20

## 2021-03-12 RX ORDER — INSULIN GLARGINE 100 [IU]/ML
45 INJECTION, SOLUTION SUBCUTANEOUS NIGHTLY
Qty: 15 ML | Refills: 10 | Status: SHIPPED | OUTPATIENT
Start: 2021-03-12 | End: 2021-06-28 | Stop reason: SDUPTHER

## 2021-03-12 RX ORDER — INSULIN ASPART 100 [IU]/ML
INJECTION, SOLUTION INTRAVENOUS; SUBCUTANEOUS
Qty: 15 ML | Refills: 10 | Status: SHIPPED | OUTPATIENT
Start: 2021-03-12 | End: 2021-06-28 | Stop reason: SDUPTHER

## 2021-03-15 ENCOUNTER — CARE COORDINATION (OUTPATIENT)
Dept: CASE MANAGEMENT | Age: 61
End: 2021-03-15

## 2021-03-15 VITALS
RESPIRATION RATE: 18 BRPM | DIASTOLIC BLOOD PRESSURE: 72 MMHG | SYSTOLIC BLOOD PRESSURE: 124 MMHG | HEART RATE: 73 BPM | BODY MASS INDEX: 33.36 KG/M2 | WEIGHT: 213 LBS | TEMPERATURE: 98 F

## 2021-03-15 NOTE — PROGRESS NOTES
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 Glenna Chan      x1 Dr. Alexandra Blanco, Dr Borges Repress CATH LAB PROCEDURE  10/02/2019    HYSTERECTOMY, TOTAL ABDOMINAL      one ovary intact, Dr Doreen Marino, menorrhagia    [place] TOTAL KNEE ARTHROPLASTY Left 2018    LEFT KNEE TOTAL KNEE ARTHROPLASTY, SHAYNA, NERVE BLOCK performed by Ra Schulte MD at [address] [place] [address] Right     TOTAL KNEE ARTHROPLASTY  16    Dr Jorge Billings [address]ELED [address] Right 2020    tunneled HD catheter per Dr Jenene Councilman     Family History   Problem Relation Age of Onset    Cancer Mother 76        survived   [facility] Hypertension Father     Diabetes Sister     Mental Illness Sister      Social History     Socioeconomic History    Marital status:      Spouse name: Not on file    Number of children: 2    Years of education: Not on file    Highest education level: Not on file   Occupational History    Occupation: disabled   Social Needs    Financial resource strain: Not on file    Food insecurity     Worry: Not on file     Inability: Not on file   [employer] needs     Medical: Not on file     Non-medical: Not on file   Tobacco Use    Smoking status: Passive Smoke Exposure - Never Smoker    Smokeless tobacco: Never Used   Substance and Sexual Activity    Alcohol rehabilitation and is able to perform ADLs, toilet self, obtain nutrition and remove self from home in an emergency. Requests home health therapy at discharge. Physical exam:   /72   Pulse 73   Temp 98 °F (36.7 °C)   Resp 18   Wt 213 lb (96.6 kg)   LMP  (LMP Unknown)   BMI 33.36 kg/m²   Constitutional: Awake and alert sitting up in room. Speech is clear and appropriate  Obese abdomen  Cardiovascular: Regular rate   -c/r  Respiratory: LCTA  GI: abdomen NT ND  : no suprapubic tenderness  Extremities: trace ankle edema, DP pulses 2+/4   Mobility: is able to transfer in out of bed to chair    ASSESSMENT:     Diagnosis Orders   1. Weakness     2. Falls frequently     3. COVID-19     4. Controlled type 2 diabetes mellitus with diabetic neuropathy, with long-term current use of insulin ([address]         PLAN:   1. Patient did make gains that she will have PT OT at home  2. Patient continues to fall despite therapy she does have neuropathy in the feet and also left-sided weakness which is multifactorial for her falls  3. COVID-19 infection is resolved she does not appear to have any residual  4. Diabetes is generally under control she does not continually take her medications as prescribed and she does not follow a consistent carb controlled diet we encouraged her to do a [facility] diet she needs follow-up with endocrine she needs an RN for medication management she has end-stage renal disease now Monday Wednesday Friday intermittent hemodialysis      Return PCP 2 weeks. Total time taken for discharging this patient: 35 minutes. Time was taken to review chart, discuss plans with discharge planner, nursing, reconciling medications, discussing plan answering questions with patient. Pertinent POC, labs, have been reviewed  Discharge patient from facility when arrangements are made. Home Health Agency with PT/OT and Nursing if needed. Same medications and Send medications with patient.       [place] is needed to allow pt to participate safely in mobility related activities of daily living. The pt has a mobility limitation that significantly impairs his/her ability to participate in one or more mobility related activities of daily living in the home which prevents pt from accomplishing the MRADL or places pt in a reasonably determined heightened risk of morbidity secondary to the attempts to perform MRADL or prevents pt from completing the MRADL within a reasonable time frame  Pt is able to safely use the walker and the functional mobility deficit can be sufficiently resolved by the use to the walker. Phylicia Blankenship, LORETO-CNP  Referral to Home Health:   Documentation of Face to Face Encounter   Certification statement    Name: Abdi Rosenthal: Roselyn Ohm  Date: 2021                   : 1960    I completed a face-to-face encounter on 2021 with the above named patient. In this encounter a medical condition was addressed, which is the primary reason for home health care. Based on my findings, the following services are medically necessary (Check all the apply):    [x] Skilled Nursing [] Speech Language Pathology [x] Physical Therapy    [] MS[facility]  [facility] [address]  [x] Occupational Therapy    The following specific clinical findings (not the diagnosis) support the need for skilled services as selected above:    RN for medication management  Therapy for strengthening and increase in endurance to reduce general weakness and improve balance for ataxic gait. The follow findings support that this patient is homebound including the need for any assistance of others and he use of Assistive Devices - what makes leaving home a considerable and taxing effort:    Using walker  that causes leaving home taxing and needs assistance of one to leave home.      Certification for [address]:  Based on the above findings, I certify that this patient meets the criteria for being homebound and has the skilled need for intermittent care as indicated above. The patient is under my care and I have intitated the request for the Plan of Care. A physician and/or non-physician practitioner and collaborating physician will follow this patient and periodically review the POC. Signature:      Date: 2/16/2021    Dr. Terri Vigil collaborator        Daylin Castillo, DNP, MSN, RN, GNP-BC, NP-C  Adult/Toni Nurse Practitioner  [address]  Department of Geriatrics  8:30am-4:30pm   [phone]  After hours Answering service [phone]      Please note this report is partially produced by using speech recognition hardware. It may contain errors related to the system, including grammar, punctuation and spelling as well as words and phrases that may seem inaccurate.   For any questions or concerns feel free to contact me for clarification

## 2021-03-15 NOTE — CARE COORDINATION
[facility] Transitions Initial Follow Up Call    Call within 2 business days of discharge: Yes    Patient: Derrick Ambriz Patient : 1960   MRN: <[id number]>  Reason for Admission: R chest wall contusion  Discharge Date: 21 RARS: Readmission Risk Score: 48      Last Discharge [facility]       Complaint Diagnosis Description Type Department Provider    21 Extremity Weakness Contusion of right chest wall, initial encounter . .. ED to Hosp-Admission (Discharged) (ADMITTED) Rona Schroeder MD; Jason Hankins. .. Acute Care Course:  Pt with multiple hospitalizations:   to [place] with CoV[facility]  1/3 ED visit    [facility] with dizziness   to     [facility] with Chest Pain   to     falls and weakness   to     [facility] (25 days) - Rehab for weakness from [facility]   to     sent to [facility] after a near fall from wheel chair and found to have R rib fxs and T11 fx which are                                 chronic.  to 3/12    [facility] with d/c home with [facility]. Sig Hx:   ESRD on HD, DMII with poly neuroapathy and inulin, obese, CAD with stents, Hep C, GERD, COPD, seizures,       DME:     Conversation:  Left HIPPA compliant message regarding the nature of the call and a request for a return call with my contact information      SERGIO Mckeon, [phone]  Orlando Ontiveros / [facility] Transition Nurse         Follow up plan:   Will reattempt      Care Transitions 24 Hour Call    Patient DME: Levy Saucedo chair  Do you have support at home?: Child, [address] [place]  Are you an active caregiver in your home?: No  Care Transitions Interventions         Follow Up  Future Appointments   Date Time Provider Aminata Cortes   3/22/2021  8:00 AM MD Aaron Vargas [facility] Aaron   2021 10:00 AM Melba Moss MD [facility], BSN, RN   Óscar Powers/ [facility] Transition Nurse  [phone]

## 2021-03-17 ENCOUNTER — CARE COORDINATION (OUTPATIENT)
Dept: CASE MANAGEMENT | Age: 61
End: 2021-03-17

## 2021-03-17 DIAGNOSIS — U07.1 COVID-19: Primary | ICD-10-CM

## 2021-03-17 PROCEDURE — 1111F DSCHRG MED/CURRENT MED MERGE: CPT | Performed by: FAMILY MEDICINE

## 2021-03-17 NOTE — CARE COORDINATION
[facility] Transitions Follow Up Call    3/17/2021    Patient: Leila Leigh  Patient : 1960   MRN: [id number]  Reason for Admission: R chest contusion  Discharge Date: 21 RARS: Readmission Risk Score: 48    Acute Care Course:  Pt with multiple hospitalizations:   to [facility] with CoVID  1/3 ED visit    [facility] with [facility]iness   to     [facility] with Chest Pain   to     falls and weakness   to     [facility] (25 days) - Rehab for weakness from CoVID   to     sent to [facility] after a near fall from wheel chair and found to have R rib fxs and T11 fx which are                                 chronic.  to 3/12    [facility] with d/c home with [place].            Sig Hx:   ESRD on HD, DMII with poly neuroapathy and inulin, obese, CAD with stents, Hep C, GERD, COPD, seizures,         DME:      Conversation:   Spoke with Debby after 2 IDs. States she is doing fine. She has Home Health from [place] which is an Aide to help her with a shower. She states her breathing is better and she is not on O2. She denies chest pain, Seh denies weakness. States she is exercising to improve her strength. She does not sleep well and this is normal and she takes melatonin. She has HD on , , and Sat and she takes the bus to HD. On 3/30 she has a vv with her PCP. Denies constipation. Daughter prepares meals. Daughter prepares the pill planner and ensures she takes her insulin. Joellen Rivero it was ok to speak to daughter    Mariposa Chavez to Ghassan Emery who stated it was ok for dietician to speak to her about a renal diet. Reviewed med with her and that Vit B12 was cyanocobalamin and educated on watching for constipation. Mom's BG in 200\"s. She questioned the bruising on her mom and I educated that pt did have heparin at hospital and was a renal pt which both can lead to bruising but to bring it to the MDs attention at visit. Left my name and number for future questions.        Spoke with: Deb    Challenges to be reviewed by the provider   Additional needs identified to be addressed with provider Yes      Family to discuss bruising with you at upcoming visit. Method of communication with provider : chart routing      Advance Care Planning:   Does patient have an Advance Directive:  reviewed and current. Was this a readmission? No  Patient stated reason for admission: CoVID  Patients top risk factors for readmission: medical condition    Care Transition Nurse (CTN) contacted the patient by telephone to perform post hospital discharge assessment. Verified name and  with patient as identifiers. Provided introduction to self, and explanation of the CTN role. CTN reviewed discharge instructions, medical action plan and red flags with patient who verbalized understanding. Patient given an opportunity to ask questions and does not have any further questions or concerns at this time. Were discharge instructions available to patient? Yes. Reviewed appropriate site of care based on symptoms and resources available to patient including: PCP. The patient agrees to contact the PCP office for questions related to their healthcare. Medication reconciliation was performed with family, who verbalizes understanding of administration of home medications. Advised obtaining a 90-day supply of all daily and as-needed medications. Discussed follow-up appointments. If no appointment was previously scheduled, appointment scheduling offered: Yes. Is follow up appointment scheduled within 7 days of discharge? Yes  [facility] follow up appointment(s):     Plan for follow-up call in 7-10 days based on severity of symptoms and risk factors. Plan for next call: self management-check on progress  CTN provided contact information for future needs.           Care Transitions Subsequent and Final Call    Subsequent and Final Calls  Do you have any ongoing symptoms?: No  Have your medications changed?: No  Do you have any questions related to your medications?: No  Do you currently have any active services?: Yes  Are you currently active with any services?: Home Health  Do you have any needs or concerns that I can assist you with?: No  Care Transitions Interventions  Other Interventions:            Follow Up  Future Appointments   Date Time Provider Aminata Cortes   3/22/2021  8:00 AM Lito Black  Judy Licona   5/4/2021 10:00 AM Lito Black MD [facility] [facility], BSN, RN   [address] [facility]/ [facility] Transition Nurse  [phone]

## 2021-03-21 PROBLEM — Z11.1 ENCOUNTER FOR SCREENING FOR RESPIRATORY TUBERCULOSIS: Status: RESOLVED | Noted: 2020-07-03 | Resolved: 2021-03-21

## 2021-03-22 ENCOUNTER — VIRTUAL VISIT (OUTPATIENT)
Dept: FAMILY MEDICINE CLINIC | Age: 61
End: 2021-03-22
Payer: MEDICARE

## 2021-03-22 DIAGNOSIS — N18.6 END STAGE RENAL DISEASE (HCC): ICD-10-CM

## 2021-03-22 DIAGNOSIS — F51.04 PSYCHOPHYSIOLOGICAL INSOMNIA: ICD-10-CM

## 2021-03-22 DIAGNOSIS — I25.119 ATHEROSCLEROSIS OF NATIVE CORONARY ARTERY OF NATIVE HEART WITH ANGINA PECTORIS (HCC): ICD-10-CM

## 2021-03-22 DIAGNOSIS — J43.1 PANLOBULAR EMPHYSEMA (HCC): ICD-10-CM

## 2021-03-22 DIAGNOSIS — Z79.4 CONTROLLED TYPE 2 DIABETES MELLITUS WITH DIABETIC NEUROPATHY, WITH LONG-TERM CURRENT USE OF INSULIN (HCC): Primary | ICD-10-CM

## 2021-03-22 DIAGNOSIS — I10 ESSENTIAL (PRIMARY) HYPERTENSION: ICD-10-CM

## 2021-03-22 DIAGNOSIS — I50.32 CHRONIC DIASTOLIC CONGESTIVE HEART FAILURE (HCC): Chronic | ICD-10-CM

## 2021-03-22 DIAGNOSIS — G40.909 SEIZURE DISORDER (HCC): ICD-10-CM

## 2021-03-22 DIAGNOSIS — R29.6 FALLS FREQUENTLY: ICD-10-CM

## 2021-03-22 DIAGNOSIS — E11.40 CONTROLLED TYPE 2 DIABETES MELLITUS WITH DIABETIC NEUROPATHY, WITH LONG-TERM CURRENT USE OF INSULIN (HCC): Primary | ICD-10-CM

## 2021-03-22 PROCEDURE — G8428 CUR MEDS NOT DOCUMENT: HCPCS | Performed by: FAMILY MEDICINE

## 2021-03-22 PROCEDURE — 2022F DILAT RTA XM EVC RTNOPTHY: CPT | Performed by: FAMILY MEDICINE

## 2021-03-22 PROCEDURE — G8484 FLU IMMUNIZE NO ADMIN: HCPCS | Performed by: FAMILY MEDICINE

## 2021-03-22 PROCEDURE — G8926 SPIRO NO PERF OR DOC: HCPCS | Performed by: FAMILY MEDICINE

## 2021-03-22 PROCEDURE — 1111F DSCHRG MED/CURRENT MED MERGE: CPT | Performed by: FAMILY MEDICINE

## 2021-03-22 PROCEDURE — 1036F TOBACCO NON-USER: CPT | Performed by: FAMILY MEDICINE

## 2021-03-22 PROCEDURE — 3023F SPIROM DOC REV: CPT | Performed by: FAMILY MEDICINE

## 2021-03-22 PROCEDURE — 3044F HG A1C LEVEL LT 7.0%: CPT | Performed by: FAMILY MEDICINE

## 2021-03-22 PROCEDURE — 99214 OFFICE O/P EST MOD 30 MIN: CPT | Performed by: FAMILY MEDICINE

## 2021-03-22 PROCEDURE — G8417 CALC BMI ABV UP PARAM F/U: HCPCS | Performed by: FAMILY MEDICINE

## 2021-03-22 PROCEDURE — 3017F COLORECTAL CA SCREEN DOC REV: CPT | Performed by: FAMILY MEDICINE

## 2021-03-22 RX ORDER — MELATONIN 10 MG
10 CAPSULE ORAL NIGHTLY
Qty: 30 CAPSULE | Refills: 12 | Status: SHIPPED | OUTPATIENT
Start: 2021-03-22 | End: 2021-06-23 | Stop reason: SDUPTHER

## 2021-03-22 NOTE — PROGRESS NOTES
Olga Lawson is a 59 y.o. female evaluated via telephone on 3/22/2021. Consent:  She and/or health care decision maker is aware that that she may receive a bill for this telephone service, depending on her insurance coverage, and has provided verbal consent to proceed: Yes      I affirm this is a Patient Initiated Episode with an Established Patient who has not had a related appointment within my department in the past 7 days or scheduled within the next 24 hours. Total Time:     Note: not billable if this call serves to triage the patient into an appointment for the relevant concern      Lupe VALDEZ     3/22/2021    TELEHEALTH EVALUATION -- Audio (During [facility]BAYY-97 public health emergency)    Chief Complaint   Patient presents with    Follow-Up from Isidoro Duarte (:  1960) has requested an audio evaluation for the following concern(s): Was recently admitted to the hospital after falling and with suspected new rib fractures which were ultimately determined to be old stable fractures. Was admitted to SNF for PT. Was released around . Her \"daughter gets paid to take care of me. \"    Today Chemomaulik Manchester states that she has no new issues. She has not had falls recently though she has a history of chronic falls resulting in rib fractures and vertebral compression fractures. She states that she uses a Rollator at all times and that she no longer bends over to  things. She has no throw rugs or other trip hazards and she states that she has night lights and well lit spaces. She has had no seizures in years. She states that her insomnia persists and requests increase in melatonin dosing. Otherwise she has no fevers, chills, sweats. No nausea, vomiting, diarrhea. She has no chest pain, shortness of breath beyond her baseline , no palpitations. No edema beyond baseline. End-stage renal disease well-controlled with routine dialysis.   She is not experiencing hypo or hyperglycemic symptoms. She received both Covid immunizations. D/C Summary Synopsis on 02/16/2021 as follows:   REASON FOR HOSPITALIZATION:  Michelle Le is a 59 y. o. female with PMH significant for CAD s/p stents (2015, 2018), ESRD on HD (T/Th/Sat), HTN, HLD, DM, Hepatitis C, GERD, COPD, seizures, anxiety and COVID-19.  She presented to the ED on 2/16/2021 after becoming weak and nearly falling while transferring from a wheelchair to a car. Tiffanyzeyad Fontenot denies falling.     Trauma work up found chronic R sided rib fractures as well as stable T11 compression fracture from prior 1/2021 imaging.  Initially there was concern that the rib fractures were new so the patient was admitted to the [facility] under the Trauma service. Nephrology and the inpatient hospitalist service was consulted.        SIGNIFICANT FINDINGS:  Catalog of Injuries:   1. Weakness but no reported fall when transferring from wheelchair to car on 2/16/2021  2. Chronic T11 compression fracture  3. Chronic right 4-9 rib fractures  4. Generalized weakness  5. Acute pain due to rib fractures  6. COVID 19 (detected in 12/2020), PCR negative 2/24  7. History of ESRD     PMHx: CAD s/p stents (2015, 2018), ESRD on HD (T/Th/Sat), HTN, HLD, DM, Hepatitis C, GERD, COPD, seizures, anxiety and COVID-19.      Incidental Findings: (reviewed 2/17/2021 HA, discussed with patient by Javed Thibodeaux PA-C on 2/19/2021)  1. There is fatty atrophy of the pancreas. 2. Subtle nodularity of the liver suggests cirrhosis.        HOSPITAL COURSE:  2/16/2021: Presented to ED after becoming weak while transferring from a wheelchair to a car.  Denies traumatic injury/fall. Admitted to the [facility] under the Trauma service. 2/18/2021: Medically cleared for discharge. Family and patient requesting rehab. SW consulted for assist with dispo planning. Pt underwent routine dialysis   2/19/2021: remained medically cleared, await placement.  Covid PCR ordered d/t facility not able to accept patient without test  2/20/2021: Routine dialysis. CM contacted regarding d/c, lab without covid supplies to run test. Awaiting test. Remains medically cleared   2/21/2021: awaiting covid results, medially cleared   2/22/2021: No change in care plan  2/24/2021: Remains medically cleared for d/c. Reached 1200 on IS. PCR Covid negative. Discharged to SNF.           Review of Systems see above. Prior to Visit Medications    Medication Sig Taking? Authorizing Provider   blood glucose test strips (FREESTYLE LITE) strip 1 each by Does not apply route 4 times daily (before meals and nightly) As needed. MARCELO Muniz   LANTUS SOLOSTAR 100 UNIT/ML injection pen Inject 45 Units into the skin nightly  MARCELO Muniz   insulin aspart (NOVOLOG FLEXPEN) 100 UNIT/ML injection pen INJECT 6 units with each meals  MARCELO Muniz   Alcohol Swabs (EASY TOUCH ALCOHOL PREP MEDIUM) 70 % PADS USE AS DIRECTED THREE TIMES A DAY  MARCELO Muniz   Insulin Pen Needle (SURE COMFORT PEN NEEDLES) 30G X 8 MM MISC USE AS DIRECTED FIVE TIMES A DAY  MARCELO Muniz   FreeStyle Lancets MISC Test 4x daily  MARCELO Muniz   Heparin Sodium, Porcine, (HEPARIN, PORCINE,) 5000 UNIT/ML injection Inject 1 mL into the skin every 8 hours For the prevention of blood clots in convalescent setting while increasing ambulation. Patient not taking: Reported on 3/17/2021  Lena Dixon PA-C   docusate sodium (COLACE, DULCOLAX) 100 MG CAPS Take 100 mg by mouth 2 times daily For the prevention and treatment of constipation while taking pain medications.   Patient not taking: Reported on 3/17/2021  Lena Dixon PA-C   acetaminophen (TYLENOL) 325 MG tablet Take 2 tablets by mouth every 4 hours as needed for Pain (For mild to moderate pain (Pain 1-6 out of 10 on pain scale))  Lena Dixon PA-C   pregabalin (LYRICA) 75 MG capsule TAKE ONE (1) CAPSULE BY MOUTH TWICE DAILY  LORETO Mckenzie - CNP   ARIPiprazole (ABILIFY) 5 MG tablet TAKE 1 TABLET BY MOUTH ONCE DAILY  Reji Maddox MD   melatonin 3 MG TABS tablet TAKE 1 TABLET BY MOUTH EVERY NIGHT AT BEDTIME AS NEEDED FOR INSOMNIA  Reji Maddox MD   triamcinolone (KENALOG) 0.1 % cream APPLY TO AFFECTED AREAS TWICE DAILY AS DIRECTED  Reji Maddox MD   nystatin ([address]) 439285 UNIT/GM powder APPLY TO ABDOMINAL FOLDS EVERY 12 HOURS AS NEEDED  Reji Maddox MD   vitamin B-12 (CYANOCOBALAMIN) 100 MCG tablet Take 1 tablet by mouth daily  Patient taking differently: Take 100 mcg by mouth daily At night  Mariela Staples PA-C   metoprolol tartrate (LOPRESSOR) 25 MG tablet Take 0.5 tablets by mouth 2 times daily  DO Romi Dykesc. Devices (BARIATRIC ROLLATOR) MISC Rolllator with a basket  Reji Maddox MD   Blood Glucose Monitoring Suppl (FREESTYLE LITE) CORETTA 1 Device by Does not apply route daily as needed (Diabetes) Use freestyle meter to test blood sugar as needed  MARCELO Wise   ranolazine (RANEXA) 500 MG extended release tablet Take 1 tablet by mouth 2 times daily  Shruthi Sexton MD   BRILINTA 90 MG TABS tablet TAKE 1 TABLET BY MOUTH 2 TIMES DAILY  Katarina Barbosa MD   pantoprazole (PROTONIX) 20 MG tablet TAKE 1 TABLET BY MOUTH DAILY  Katarina Barbosa MD   B Complex-C-Folic Acid (NEPHRO VITAMINS) 0.8 MG TABS Take 1 tablet by mouth daily   Historical Provider, MD   albuterol sulfate HFA (VENTOLIN HFA) 108 (90 Base) MCG/ACT inhaler Inhale 2 puffs into the lungs every 6 hours as needed for Wheezing  Historical Provider, MD   sertraline (ZOLOFT) 50 MG tablet TAKE 1 TABLET BY MOUTH DAILY  Reji Maddox MD   fluticasone (FLOVENT HFA) 110 MCG/ACT inhaler Inhale 2 puffs into the lungs 2 times daily  Reji Maddox MD   [facility]. Devices [facility]) MIS To use with transport outside of the house.   Reji Maddox MD   isosorbide mononitrate (IMDUR) 60 MG extended release tablet Take 1 tablet by mouth daily  Tara Cabrera MD   amLODIPine (NORVASC) 10 MG tablet TAKE 1 TABLET BY MOUTH DAILY  Oksana Queen MD   aspirin 81 MG tablet Take 1 tablet by mouth daily  Analia Cespedes MD   atorvastatin (LIPITOR) 40 MG tablet Take 1 tablet by mouth daily  Analia Cespedes MD   nitroGLYCERIN (NITROSTAT) 0.4 MG SL tablet Place 1 tablet under the tongue every 5 minutes as needed for Chest pain  Historical Provider, MD   magnesium oxide (MAG-OX) 400 MG tablet Take 400 mg by mouth daily  Historical Provider, MD             PHYSICAL EXAMINATION:    Patient-Reported Vitals 9/23/2020   Patient-Reported Weight 219lb   Patient-Reported Height 5' 7\"   Patient-Reported Systolic 551   Patient-Reported Diastolic 80          Patient appears to be alert and oriented to person, place, time, situation and is in no acute distress.   Respiratory effort appears normal. Mood appears stable and speech and thought are grossly normal.      Lab Results   Component Value Date     06/14/2013     TSH:   Lab Results   Component Value Date    TSH 0.454 06/28/2020     Lipid Profile: No components found for: CHLPL  Lab Results   Component Value Date    TRIG 82 06/11/2020    TRIG 85 05/25/2020    TRIG 116 02/26/2019     Lab Results   Component Value Date    HDL 48 06/11/2020    HDL 44 05/25/2020    HDL 55 02/26/2019     Lab Results   Component Value Date    LDLCALC 37 06/11/2020    LDLCALC 46 05/25/2020    LDLCALC 59 02/26/2019     Lab Results   Component Value Date    LABVLDL 59.0 05/04/2013     Hemoglobin A1C:   Lab Results   Component Value Date    LABA1C 6.5 (H) 03/02/2021             ASSESSMENT/PLAN:  Kenji Cai was seen today for follow-up from hospital.    Diagnoses and all orders for this visit:    Controlled type 2 diabetes mellitus with diabetic neuropathy, with long-term current use of insulin ([address]  Comments:  Stable and well-controlled on current meds    End stage renal disease ([facility]  Comments:  Stable and well-controlled on HD    Atherosclerosis of native coronary artery of native heart with angina pectoris ([address]  Comments:  Stable and well-controlled on current meds. Panlobular emphysema ([facility]  Comments:  Stable and well-controlled on current meds    Seizure disorder ([facility]  Comments:  Stable and well-controlled. And active for years. Chronic diastolic congestive heart failure (HCC)  Comments:  Stable and well-controlled on current meds    Essential (primary) hypertension  Comments:  Stable and well-controlled on current meds    Psychophysiological insomnia  Comments: Worse, poor control. Increase melatonin to 10 mg. Falls frequently  Comments:  Poorly controlled. Discussed at length safety precautions. Patient emphatically defers residing in SNF. Return in about 4 months (around 7/22/2021) for CKD, Mood Disorder, falls - OV 8 or 830am lease. Cleve Vora is a 59 y.o. female being evaluated by a Virtual Visit (telephonic visit) encounter to address concerns as mentioned above. A caregiver was present when appropriate. Due to this being a TeleHealth encounter (During [facility] public health emergency), evaluation of the following organ systems was limited: Vitals/Constitutional/EENT/Resp/CV/GI//MS/Neuro/Skin/Heme-Lymph-Imm. Pursuant to the emergency declaration under the [address] [place] [address] and the [employer] and [employer] Act, this Virtual Visit was conducted with patient's (and/or legal guardian's) consent, to reduce the patient's risk of exposure to COVID-19 and provide necessary medical care. The patient (and/or legal guardian) has also been advised to contact this office for worsening conditions or problems, and seek emergency medical treatment and/or call 911 if deemed necessary.      Services were provided through a telephonic synchronous discussion virtually to substitute for in-person clinic visit. Patient and provider were located at their individual homes. --Albert Turcios MD on 3/22/2021 at 8:38 AM    An electronic signature was used to authenticate this note.

## 2021-03-24 ENCOUNTER — CARE COORDINATION (OUTPATIENT)
Dept: CASE MANAGEMENT | Age: 61
End: 2021-03-24

## 2021-03-24 NOTE — CARE COORDINATION
[facility] Transitions Follow Up Call    3/24/2021    Patient: Ciera Jernigan  Patient : 1960   MRN: <[id number]>  Reason for Admission  Discharge Date: 21 RARS: Readmission Risk Score: 48    Acute Care Course:   to [facility] with [facility]  1/3 ED visit    [place] with dizziness   to     [facility] with Chest Pain   to     falls and weakness   to     [facility] (25 days) - Rehab for weakness from [facility]   to     sent to [id number] after a near fall from wheel chair and found to have R rib fxs and T11 fx which are                                 chronic.  to 3/12    [facility] with [facility] home with [facility]    Sig Hx:   ESRD on HD, DMII with poly neuroapathy and inulin, obese, CAD with stents, Hep C, GERD, COPD, seizures  DME:   walker  Conversation:  Kenji Cai reports she is doing pretty good. She has a good appetite. Adheres to her fluid restriction. Bladder and bowel elimination has a regular pattern. Patient denies sob, chills, fever, cough, fatigue, weight gain, weakness, cp, falls or swelling. Ambulates with a walker. She has a bath aide. Declined to have PT. Blood sugars have been running in the 200's. She says that is normal for her. Caller encouraged patient to watch her carbohydrate and sugar intake. Taking medication as prescribed. No questions or concerns at this time. Will follow up. Follow up plan:   Check progress      Care Transitions Subsequent and Final Call    Subsequent and Final Calls  Do you have any ongoing symptoms?: No  Have your medications changed?: No  Do you have any questions related to your medications?: No  Do you currently have any active services?: Yes  Are you currently active with any services?: Home Health  Do you have any needs or concerns that I can assist you with?: No  Identified Barriers: None  Care Transitions Interventions  Other Interventions:            Follow Up  Future Appointments   Date Time Provider Aminata Cortes   3/29/2021  1:30 PM Andrei Beck, [address]   5/4/2021 10:00 AM MD [facility]   7/27/2021  8:00 AM Kim Chao [place], [place]

## 2021-03-29 ENCOUNTER — OFFICE VISIT (OUTPATIENT)
Dept: ENDOCRINOLOGY | Age: 61
End: 2021-03-29
Payer: MEDICARE

## 2021-03-29 VITALS
WEIGHT: 210 LBS | HEART RATE: 65 BPM | HEIGHT: 67 IN | SYSTOLIC BLOOD PRESSURE: 107 MMHG | BODY MASS INDEX: 32.96 KG/M2 | DIASTOLIC BLOOD PRESSURE: 67 MMHG

## 2021-03-29 DIAGNOSIS — E11.65 UNCONTROLLED TYPE 2 DIABETES MELLITUS WITH HYPERGLYCEMIA (HCC): Primary | ICD-10-CM

## 2021-03-29 DIAGNOSIS — E11.40 CHRONIC PAINFUL DIABETIC NEUROPATHY (HCC): ICD-10-CM

## 2021-03-29 LAB
CHP ED QC CHECK: NORMAL
GLUCOSE BLD-MCNC: 217 MG/DL

## 2021-03-29 PROCEDURE — 99213 OFFICE O/P EST LOW 20 MIN: CPT | Performed by: INTERNAL MEDICINE

## 2021-03-29 PROCEDURE — G8427 DOCREV CUR MEDS BY ELIG CLIN: HCPCS | Performed by: INTERNAL MEDICINE

## 2021-03-29 PROCEDURE — G8484 FLU IMMUNIZE NO ADMIN: HCPCS | Performed by: INTERNAL MEDICINE

## 2021-03-29 PROCEDURE — 3017F COLORECTAL CA SCREEN DOC REV: CPT | Performed by: INTERNAL MEDICINE

## 2021-03-29 PROCEDURE — G8417 CALC BMI ABV UP PARAM F/U: HCPCS | Performed by: INTERNAL MEDICINE

## 2021-03-29 PROCEDURE — 82962 GLUCOSE BLOOD TEST: CPT | Performed by: INTERNAL MEDICINE

## 2021-03-29 PROCEDURE — 3044F HG A1C LEVEL LT 7.0%: CPT | Performed by: INTERNAL MEDICINE

## 2021-03-29 PROCEDURE — 1036F TOBACCO NON-USER: CPT | Performed by: INTERNAL MEDICINE

## 2021-03-29 PROCEDURE — 2022F DILAT RTA XM EVC RTNOPTHY: CPT | Performed by: INTERNAL MEDICINE

## 2021-03-29 RX ORDER — PREGABALIN 75 MG/1
CAPSULE ORAL
Qty: 60 CAPSULE | Refills: 2 | OUTPATIENT
Start: 2021-03-29 | End: 2021-06-27

## 2021-03-29 NOTE — PROGRESS NOTES
Subjective:      Patient ID: Nisha Middleton is a 59 y.o. female. Follow-up on her type 2 diabetes patient was seen in January for uncontrolled diabetes currently on Lantus 45 units at night Humalog 6 with each meal complications include end-stage renal disease on dialysis patient was admitted in the hospital for COVID-19 pneumonia hemoglobin A1c last 1 was 6.5 patient testing blood sugar 2-3 times daily  Diabetes  She presents for her follow-up diabetic visit. She has type 2 diabetes mellitus. Associated symptoms include fatigue. There are no hypoglycemic complications. Symptoms are improving. Diabetic complications include nephropathy and peripheral neuropathy. Risk factors for coronary artery disease include obesity. Current diabetic treatment includes insulin injections. She is currently taking insulin pre-breakfast, pre-lunch, pre-dinner and at bedtime. Her overall blood glucose range is 130-140 mg/dl. (Lab Results       Component                Value               Date                       LABA1C                   6.5 (H)             03/02/2021            )       Results for Que Quarles (MRN [id number]) as of 3/29/2021 13:30   Ref. Range 12/25/2020 23:15 12/26/2020 07:22 1/13/2021 18:33 1/20/2021 07:04 3/2/2021 07:05   Hemoglobin A1C Latest Ref Range: 4.8 - 5.9 % 7.2 (H) 7.7 (H) 7.2 (H) 7.3 (H) 6.5 (H)         Results for Que Quarles (MRN [id number]) as of 3/29/2021 13:30   Ref.  Range 2/24/2021 16:25 3/2/2021 07:05 3/29/2021 13:15   Sodium Latest Ref Range: 135 - 144 mEq/L  135    Potassium Latest Ref Range: 3.4 - 4.9 mEq/L  4.1    Chloride Latest Ref Range: 95 - 107 mEq/L  96    CO2 Latest Ref Range: 20 - 31 mEq/L  26    BUN Latest Ref Range: 8 - 23 mg/dL  34 (H)    Creatinine Latest Ref Range: 0.50 - 0.90 mg/dL  4.59 (H)    Anion Gap Latest Ref Range: 9 - 15 mEq/L  13    GFR Non- Latest Ref Range: >60   9.6 (L)    GFR African American Latest Ref Range: >60   11.7 (L)    Glucose Latest Units: mg/dL  183 (H) 217   POC Glucose Latest Ref Range: 60 - 115 mg/dl 129 (H)     Calcium Latest Ref Range: 8.5 - 9.9 mg/dL  9.9    Hemoglobin A1C Latest Ref Range: 4.8 - 5.9 %  6.5 (H)    WBC Latest Ref Range: 4.8 - 10.8 K/uL  5.2    RBC Latest Ref Range: 4.20 - 5.40 M/uL  3.47 (L)    Hemoglobin Quant Latest Ref Range: 12.0 - 16.0 g/dL  10.5 (L)    Hematocrit Latest Ref Range: 37.0 - 47.0 %  31.3 (L)    MCV Latest Ref Range: 82.0 - 100.0 fL  90.0    MCH Latest Ref Range: 27.0 - 31.3 pg  30.3    MCHC Latest Ref Range: 33.0 - 37.0 %  33.6    RDW Latest Ref Range: 11.5 - 14.5 %  16.0 (H)    Platelet Count Latest Ref Range: 130 - 400 K/uL  125 (L)      Patient Active Problem List   Diagnosis    Atherosclerotic heart disease of native coronary artery with unspecified angina pectoris (HCC)    Schizophrenia, paranoid, chronic    Metabolic syndrome    Vitamin B 12 deficiency    Cerebral microvascular disease    Mixed hyperlipidemia    Other hammer toe (acquired)    Vitamin D insufficiency    Incontinence of urine    Diabetic nephropathy with proteinuria (HCC)    Essential (primary) hypertension    History of type C viral hepatitis    Urinary incontinence due to cognitive impairment    History of seizures    Stented coronary artery-plan is to stay on Plavix indefinately per Dr Katherin Ross Other specified diabetes mellitus with diabetic neuropathy, unspecified ([address]    Controlled type 2 diabetes mellitus with diabetic neuropathy, with long-term current use of insulin (HCC)    Hemiparesis, left (HCC)    Angina, class II ([address]    Pain, unspecified    Tardive dyskinesia    Shortness of breath    Uncontrolled type 2 diabetes mellitus with hyperglycemia (HCC)    Chronic diastolic congestive heart failure (HCC)    Sleep apnea, unspecified    Pulmonary hypertension, unspecified (HCC)    Class 2 severe obesity with serious comorbidity and body mass index (BMI) of 36.0 to 36.9 in [facility])    Edema    Closed supracondylar fracture of right humerus    Other chronic pain    Palliative care patient    Chest pain    Recurrent falls    Renal failure    Difficulty in walking    End stage renal disease (HCC)    Weakness    Other seizures (HCC)    Moderate persistent asthma without complication    Thrush    COVID-19    Post PTCA    Falls frequently    Contusion of right chest wall    Chest wall contusion, left, initial encounter    Headache, unspecified    Encounter for immunization    Paranoid schizophrenia ([facility]    Compression fracture of spine (HCC)    Closed rib fracture    Depression    Chronic obstructive pulmonary disease (HCC)    Critical illness polyneuropathy (HCC)     Allergies   Allergen Reactions    Codeine Hives     hives    Oxycontin [Oxycodone Hcl] Hives       Current Outpatient Medications:     melatonin 10 MG CAPS capsule, Take 1 capsule by mouth nightly, Disp: 30 capsule, Rfl: 12    blood glucose test strips (FREESTYLE LITE) strip, 1 each by Does not apply route 4 times daily (before meals and nightly) As needed. , Disp: 200 strip, Rfl: 5    LANTUS SOLOSTAR 100 UNIT/ML injection pen, Inject 45 Units into the skin nightly, Disp: 15 mL, Rfl: 10    insulin aspart (NOVOLOG FLEXPEN) 100 UNIT/ML injection pen, INJECT 6 units with each meals, Disp: 15 mL, Rfl: 10    Alcohol Swabs (EASY TOUCH ALCOHOL PREP MEDIUM) 70 % PADS, USE AS DIRECTED THREE TIMES A DAY, Disp: 100 each, Rfl: 3    Insulin Pen Needle (SURE COMFORT PEN NEEDLES) 30G X 8 MM MISC, USE AS DIRECTED FIVE TIMES A DAY, Disp: 100 each, Rfl: 10    FreeStyle Lancets MISC, Test 4x daily, Disp: 150 each, Rfl: 3    Heparin Sodium, Porcine, (HEPARIN, PORCINE,) 5000 UNIT/ML injection, Inject 1 mL into the skin every 8 hours For the prevention of blood clots in convalescent setting while increasing ambulation. , Disp: 45 mL, Rfl: 0    docusate sodium (COLACE, DULCOLAX) 100 MG CAPS, Take 100 mg by mouth 2 times daily For the prevention and treatment of constipation while taking pain medications. , Disp: 30 capsule, Rfl: 0    acetaminophen (TYLENOL) 325 MG tablet, Take 2 tablets by mouth every 4 hours as needed for Pain (For mild to moderate pain (Pain 1-6 out of 10 on pain scale)), Disp: 120 tablet, Rfl: 0    pregabalin (LYRICA) 75 MG capsule, TAKE ONE (1) CAPSULE BY MOUTH TWICE DAILY, Disp: 60 capsule, Rfl: 2    ARIPiprazole (ABILIFY) 5 MG tablet, TAKE 1 TABLET BY MOUTH ONCE DAILY, Disp: 30 tablet, Rfl: 2    triamcinolone (KENALOG) 0.1 % cream, APPLY TO AFFECTED AREAS TWICE DAILY AS DIRECTED, Disp: 80 g, Rfl: 10    nystatin (NYAMYC) 533977 UNIT/GM powder, APPLY TO ABDOMINAL FOLDS EVERY 12 HOURS AS NEEDED, Disp: 60 g, Rfl: 10    vitamin B-12 (CYANOCOBALAMIN) 100 MCG tablet, Take 1 tablet by mouth daily (Patient taking differently: Take 100 mcg by mouth daily At night), Disp: 30 tablet, Rfl: 10    metoprolol tartrate (LOPRESSOR) 25 MG tablet, Take 0.5 tablets by mouth 2 times daily, Disp: 30 tablet, Rfl: 1    Misc.  Devices (BARIATRIC ROLLATOR) MISC, Rolllator with a basket, Disp: 1 each, Rfl: 0    Blood Glucose Monitoring Suppl (FREESTYLE LITE) CORETTA, 1 Device by Does not apply route daily as needed (Diabetes) Use freestyle meter to test blood sugar as needed, Disp: 1 Device, Rfl: 0    ranolazine (RANEXA) 500 MG extended release tablet, Take 1 tablet by mouth 2 times daily, Disp: 180 tablet, Rfl: 2    BRILINTA 90 MG TABS tablet, TAKE 1 TABLET BY MOUTH 2 TIMES DAILY, Disp: 60 tablet, Rfl: 11    pantoprazole (PROTONIX) 20 MG tablet, TAKE 1 TABLET BY MOUTH DAILY, Disp: 90 tablet, Rfl: 3    B Complex-C-Folic Acid (NEPHRO VITAMINS) 0.8 MG TABS, Take 1 tablet by mouth daily , Disp: , Rfl:     albuterol sulfate HFA (VENTOLIN HFA) 108 (90 Base) MCG/ACT inhaler, Inhale 2 puffs into the lungs every 6 hours as needed for Wheezing, Disp: , Rfl:     sertraline (ZOLOFT) 50 MG tablet, TAKE 1 TABLET BY MOUTH DAILY, Disp: 90 tablet, Rfl: 3    fluticasone (FLOVENT HFA) 110 MCG/ACT inhaler, Inhale 2 puffs into the lungs 2 times daily, Disp: 1 Inhaler, Rfl: 12    Misc. Devices [facility]) MISC, To use with transport outside of the house., Disp: 1 each, Rfl: 0    isosorbide mononitrate (IMDUR) 60 MG extended release tablet, Take 1 tablet by mouth daily, Disp: 90 tablet, Rfl: 3    amLODIPine (NORVASC) 10 MG tablet, TAKE 1 TABLET BY MOUTH DAILY, Disp: 90 tablet, Rfl: 1    aspirin 81 MG tablet, Take 1 tablet by mouth daily, Disp: 90 tablet, Rfl: 3    atorvastatin (LIPITOR) 40 MG tablet, Take 1 tablet by mouth daily, Disp: 90 tablet, Rfl: 3    nitroGLYCERIN (NITROSTAT) 0.4 MG SL tablet, Place 1 tablet under the tongue every 5 minutes as needed for Chest pain, Disp: , Rfl:     magnesium oxide (MAG-OX) 400 MG tablet, Take 400 mg by mouth daily, Disp: , Rfl:     Review of Systems   Constitutional: Positive for fatigue. Vitals:    03/29/21 1310   BP: 107/67   Pulse: 65   Weight: 210 lb (95.3 kg)   Height: 5' 7\" (1.702 m)       Objective:   Physical Exam  Vitals signs reviewed. Constitutional:       Appearance: Normal appearance. She is obese. HENT:      Head: Normocephalic and atraumatic. Nose: Nose normal.   Eyes:      Extraocular Movements: Extraocular movements intact. Neck:      Musculoskeletal: Normal range of motion and neck supple. Cardiovascular:      Rate and Rhythm: Normal rate. Musculoskeletal: Normal range of motion. Neurological:      General: No focal deficit present. Mental Status: She is alert and oriented to person, place, and time. Psychiatric:         Mood and Affect: Mood is depressed. Assessment:       Diagnosis Orders   1.  Uncontrolled type 2 diabetes mellitus with hyperglycemia (HCC)  POCT Glucose    Basic Metabolic Panel    Hemoglobin A1C           Plan:      Orders Placed This Encounter   Procedures    Basic Metabolic Panel     Standing Status:   Future     Standing Expiration Date: 3/29/2022    Hemoglobin A1C     Standing Status:   Future     Standing Expiration Date:   3/29/2022    POCT Glucose     Continue Lantus 45 units at bedtime plus NovoLog 6 units with each meals follow-up in 3 to 6 months time          Darling Luu MD

## 2021-03-30 ENCOUNTER — CARE COORDINATION (OUTPATIENT)
Dept: CASE MANAGEMENT | Age: 61
End: 2021-03-30

## 2021-03-30 NOTE — CARE COORDINATION
[facility] Transitions Follow Up Call    Patient: Buddy Cope Patient : 1960   MRN: [id number]  Reason for Admission: fall    Discharge Date: 21 RARS: Readmission Risk Score: 48      Last Discharge [address] [address]       Complaint Diagnosis Description Type Department Provider    21 Extremity Weakness Contusion of right chest wall, initial encounter . .. ED to Hosp-Admission (Discharged) (ADMITTED) Marianna Benitez MD; Chirag Biggs. .. Spoke with [facility] after 2 ids. Stated she is doing well and keeping her HD appts. She is following her fluid restriction and she is using her walker. She states she has shoulder pain and taking Tramadol for that. Her ribs no longer hurt. She denies SOB and chest pain and edema. Her BG are high but MD aware. They are working on it per her but when asked she could not state what was being done. I encouraged to restrict carbs.         Spoke with: [facility] Transitions 24 Hour Call    Do you have all of your prescriptions and are they filled?: Yes  Patient DME: Margarita Acosta chair  Do you have support at home?: Child, Grandchild  Are you an active caregiver in your home?: No  Care Transitions Interventions         Follow Up  Future Appointments   Date Time Provider Aminata Cortes   2021 10:00 AM Corinne Alexanders,  [address]   2021  1:30 PM Juliana Matos  [address]   2021  8:00 AM Corinne Alexanders, MD [address] [facility] [address], ADALIDN, RN   [address] Nicki Pineda [facility]/ [facility] Transition Nurse  [phone]

## 2021-04-05 VITALS
OXYGEN SATURATION: 98 % | RESPIRATION RATE: 18 BRPM | SYSTOLIC BLOOD PRESSURE: 110 MMHG | DIASTOLIC BLOOD PRESSURE: 66 MMHG | TEMPERATURE: 97.2 F | HEART RATE: 69 BPM

## 2021-04-05 PROBLEM — S22.41XA MULTIPLE CLOSED FRACTURES OF RIBS OF RIGHT SIDE: Status: ACTIVE | Noted: 2021-04-05

## 2021-04-05 NOTE — PROGRESS NOTES
Name: [facility] And : [facility]  [facility]   Ailyn servin  Date: 3/5/2021     Subjective:     Chief Complaint   Patient presents with    Follow-up     Right humerus fracture multiple rib fractures type 2 diabetes COPD falls       HPI  Zackery Hinds is a 59 y.o. female being seen today for evaluation of acute rehab progress. Resident has been participating in PT and OT, she is ambulating 270 feet with Rollator contact-guard assist, fatigues easily and becomes unsafe, 12 steps contact-guard assist with handrail on the right side, transfers with supervision/minimal assist especially when becomes fatigued. OT reports moderate independence for toileting, upper body and lower body bathing min assist, upper body dressing min assist to don bra, lower body dressing standby assist.  Patient will have 24-hour care at home which she will need. Resident is being cut by insurance, potential discharge 3/12/2021. Will need home health care with PT and OT. Resident denies complaints of pain, chest pain, irregular heartbeat, chest palpitations, lightheadedness, dizziness, nausea, diarrhea, vomiting, constipation, dysuria. She wants to go home. Blood sugars are within acceptable range, no falls since admission. COPD is at baseline. Lung sounds are clear. Vital signs stable, no fever.     Past Medical History:   Diagnosis Date    Anxiety     CAD S/P percutaneous coronary angioplasty 2015, 2018    stents per dr Page January    CHF (congestive heart failure) ([address]     CKD (chronic kidney disease) stage 4, GFR 15-29 ml/min ([address] 2/24/2018    CKD stage 4 due to type 2 diabetes mellitus ([address]     COPD (chronic obstructive pulmonary disease) ([address]     Diabetic nephropathy with proteinuria ([address] 2014    DJD (degenerative joint disease) of knee     Dr Angel Real GERD (gastroesophageal reflux disease)     Hemiparesis, left ([address] 2013    entered Assisted Living ([place] neuropathy, with long-term current use of insulin ([facility]     5. Chronic obstructive pulmonary disease, unspecified COPD type ([facility]           Assessment and Plan:      1. Closed supracondylar fracture of right humerus with routine healing, subsequent encounter  Follow-up with Ortho as scheduled    2. Closed fracture of multiple ribs of right side, initial encounter  Follow-up with Ortho as scheduled    3. Falls frequently  Continue PT OT as ordered    4. Controlled type 2 diabetes mellitus with diabetic neuropathy, with long-term current use of insulin ([facility])  Blood sugars within acceptable range, continue medication as ordered. 5. Chronic obstructive pulmonary disease, unspecified COPD type ([facility]  Symptoms are at baseline, continue meds as ordered. Reviewed labs:  Yes    I have reviewed the patient's medical history in detail and updated the computerized patient record. This note was partially generated using Dragon voice recognition system, and there may be some incorrect words, spellings, punctuation that were not noticed in checking the note before saving.     Hermilo Ybarra, APRN-CNP

## 2021-04-09 ENCOUNTER — TELEPHONE (OUTPATIENT)
Dept: FAMILY MEDICINE CLINIC | Age: 61
End: 2021-04-09

## 2021-04-09 NOTE — TELEPHONE ENCOUNTER
Just a FYI:   Tiara Aleman from [facility] [place] calling to introduce himself as patients [facility]: [phone]     Thank you.

## 2021-04-15 ENCOUNTER — TELEPHONE (OUTPATIENT)
Dept: FAMILY MEDICINE CLINIC | Age: 61
End: 2021-04-15

## 2021-04-15 RX ORDER — FLUCONAZOLE 150 MG/1
TABLET ORAL
Qty: 2 TABLET | Refills: 1 | Status: SHIPPED | OUTPATIENT
Start: 2021-04-15 | End: 2021-07-15

## 2021-04-15 NOTE — TELEPHONE ENCOUNTER
Patient is calling to request something for yeast infection, she is having the vaginal itching real bad and white discharge. Offered her the [address] and she wanted me to send you a message first to see if you can sent something to her pharmacy. Please advise and thanks!

## 2021-04-20 ENCOUNTER — HOSPITAL ENCOUNTER (EMERGENCY)
Age: 61
Discharge: LEFT BEFORE DISCHARGE | End: 2021-04-20
Payer: MEDICARE

## 2021-04-20 RX ORDER — PEN NEEDLE, DIABETIC 30 GX5/16"
NEEDLE, DISPOSABLE MISCELLANEOUS
Qty: 100 EACH | Refills: 10 | Status: SHIPPED | OUTPATIENT
Start: 2021-04-20 | End: 2021-10-20 | Stop reason: SDUPTHER

## 2021-04-20 NOTE — TELEPHONE ENCOUNTER
Rx request   Requested Prescriptions     Pending Prescriptions Disp Refills    Insulin Pen Needle (SURE COMFORT PEN NEEDLES) 30G X 8 MM MISC [Pharmacy Med Name: Gin Cabrera [id number] PEN NL 30GX 5/16\" NEDL] 100 each 10     Sig: USE AS DIRECTED FIVE TIMES A DAILY     LOV 3/22/2021  Next Visit Date:  Future Appointments   Date Time Provider Aminata Cortes   5/4/2021 10:00 AM Reji Leung MD [facility]   6/28/2021  1:30 PM Manolo Watkins MD [address] [place] [address]   7/27/2021  8:00 AM Reji Leung MD [address] [place] [address]

## 2021-04-28 RX ORDER — ATORVASTATIN CALCIUM 40 MG/1
40 TABLET, FILM COATED ORAL DAILY
Qty: 90 TABLET | Refills: 4 | Status: SHIPPED | OUTPATIENT
Start: 2021-04-28 | End: 2022-05-02

## 2021-04-28 RX ORDER — MAGNESIUM OXIDE 400 MG/1
400 TABLET ORAL DAILY
Qty: 90 TABLET | Refills: 4 | Status: SHIPPED | OUTPATIENT
Start: 2021-04-28 | End: 2022-05-02

## 2021-05-04 ENCOUNTER — OFFICE VISIT (OUTPATIENT)
Dept: FAMILY MEDICINE CLINIC | Age: 61
End: 2021-05-04
Payer: MEDICAID

## 2021-05-04 DIAGNOSIS — Z00.00 ROUTINE GENERAL MEDICAL EXAMINATION AT A HEALTH CARE FACILITY: Primary | ICD-10-CM

## 2021-05-04 PROCEDURE — 3017F COLORECTAL CA SCREEN DOC REV: CPT | Performed by: FAMILY MEDICINE

## 2021-05-04 PROCEDURE — G0438 PPPS, INITIAL VISIT: HCPCS | Performed by: FAMILY MEDICINE

## 2021-05-04 ASSESSMENT — PATIENT HEALTH QUESTIONNAIRE - PHQ9
1. LITTLE INTEREST OR PLEASURE IN DOING THINGS: 0
SUM OF ALL RESPONSES TO PHQ QUESTIONS 1-9: 0
2. FEELING DOWN, DEPRESSED OR HOPELESS: 0
SUM OF ALL RESPONSES TO PHQ QUESTIONS 1-9: 0

## 2021-05-04 ASSESSMENT — LIFESTYLE VARIABLES: HOW OFTEN DO YOU HAVE A DRINK CONTAINING ALCOHOL: 0

## 2021-05-04 NOTE — PROGRESS NOTES
Medicare Annual Wellness Visit  Are Name: Orion Adair Date: 2021   MRN: [id number] Sex: Female   Age: 59 y.o. Ethnicity: Non-/Non    : 1960 Race: White      Michelle Parekh is here for No chief complaint on file. Screenings for behavioral, psychosocial and functional/safety risks, and cognitive dysfunction are all negative except as indicated below. These results, as well as other patient data from the [address] form, are documented in Flowsheets linked to this Encounter. Allergies   Allergen Reactions    Codeine Hives     hives    Oxycontin [Oxycodone Hcl] Hives       Prior to Visit Medications    Medication Sig Taking? Authorizing Provider   magnesium oxide (MAG-OX) 400 MG tablet Take 1 tablet by mouth daily  Bhavana Escalante MD   atorvastatin (LIPITOR) 40 MG tablet Take 1 tablet by mouth daily  Bhavana Escalante MD   Insulin Pen Needle (SURE COMFORT PEN NEEDLES) 30G X 8 MM MISC USE AS DIRECTED FIVE TIMES A DAILY  MARCELO Kumar   fluconazole (DIFLUCAN) 150 MG tablet Take one tab let now and repeat in 48 hours if symptoms persists; do not take statin cholesterol medicine on the days you take this medicine  Bhavana Escalante MD   melatonin 10 MG CAPS capsule Take 1 capsule by mouth nightly  Bhavana Escalante MD   blood glucose test strips (FREESTYLE LITE) strip 1 each by Does not apply route 4 times daily (before meals and nightly) As needed.   MARCELO Kumar   LANTUS SOLOSTAR 100 UNIT/ML injection pen Inject 45 Units into the skin nightly  MARCELO Kumar   insulin aspart (NOVOLOG FLEXPEN) 100 UNIT/ML injection pen INJECT 6 units with each meals  MARCELO Kumar   Alcohol Swabs (EASY TOUCH ALCOHOL PREP MEDIUM) 70 % PADS USE AS DIRECTED THREE TIMES A DAY  MARCELO Kumar   FreeStyle Lancets MISC Test 4x daily  MARCELO Kumar   Heparin Sodium, Porcine, (HEPARIN, PORCINE,) 5000 UNIT/ML injection Inject 1 mL into the skin every 8 hours For the prevention of blood clots in convalescent setting while increasing ambulation. Lena iDxon PA-C   docusate sodium (COLACE, DULCOLAX) 100 MG CAPS Take 100 mg by mouth 2 times daily For the prevention and treatment of constipation while taking pain medications. Lena Dixon PA-C   acetaminophen (TYLENOL) 325 MG tablet Take 2 tablets by mouth every 4 hours as needed for Pain (For mild to moderate pain (Pain 1-6 out of 10 on pain scale))  Lena Dixon PA-C   pregabalin (LYRICA) 75 MG capsule TAKE ONE (1) CAPSULE BY MOUTH TWICE DAILY  LORETO Thorpe CNP   ARIPiprazole (ABILIFY) 5 MG tablet TAKE 1 TABLET BY MOUTH ONCE DAILY  Johanna Dorsey MD   triamcinolone (KENALOG) 0.1 % cream APPLY TO AFFECTED AREAS TWICE DAILY AS DIRECTED  Johanna Dorsey MD   nystatin ([address]) 950137 UNIT/GM powder APPLY TO ABDOMINAL FOLDS EVERY 12 HOURS AS NEEDED  Johanna Dorsey MD   vitamin B-12 (CYANOCOBALAMIN) 100 MCG tablet Take 1 tablet by mouth daily  Patient taking differently: Take 100 mcg by mouth daily At night  Mariela Staples PA-C   metoprolol tartrate (LOPRESSOR) 25 MG tablet Take 0.5 tablets by mouth 2 times daily  Lyle Alexander DO   [facility].  Devices (BARIATRIC ROLLATOR) MISC Rolllator with a basket  Johanna Dorsey MD   Blood Glucose Monitoring Suppl (FREESTYLE LITE) CORETTA 1 Device by Does not apply route daily as needed (Diabetes) Use freestyle meter to test blood sugar as needed  MARCELO Young   ranolazine (RANEXA) 500 MG extended release tablet Take 1 tablet by mouth 2 times daily  Celso Tuttle MD   BRILINTA 90 MG TABS tablet TAKE 1 TABLET BY MOUTH 2 TIMES DAILY  Anahi Allan MD   pantoprazole (PROTONIX) 20 MG tablet TAKE 1 TABLET BY MOUTH DAILY  Anahi Allan MD   B Complex-C-Folic Acid (NEPHRO VITAMINS) 0.8 MG TABS Take 1 tablet by mouth daily   Historical Provider, MD   albuterol sulfate HFA (VENTOLIN HFA) 108 (90 Base) MCG/ACT inhaler Inhale 2 puffs into the lungs every 6 hours as needed for Wheezing  Historical Provider, MD   sertraline (ZOLOFT) 50 MG tablet TAKE 1 TABLET BY MOUTH DAILY  Cecile Luu MD   fluticasone (FLOVENT HFA) 110 MCG/ACT inhaler Inhale 2 puffs into the lungs 2 times daily  Cecile Luu MD   Misc. Devices [facility]) MISC To use with transport outside of the house.   Cecile Luu MD   isosorbide mononitrate (IMDUR) 60 MG extended release tablet Take 1 tablet by mouth daily  Ines Hubbard MD   amLODIPine (NORVASC) 10 MG tablet TAKE 1 TABLET BY MOUTH DAILY  Rocco Butcher MD   aspirin 81 MG tablet Take 1 tablet by mouth daily  Cecile Luu MD   nitroGLYCERIN (NITROSTAT) 0.4 MG SL tablet Place 1 tablet under the tongue every 5 minutes as needed for Chest pain  Historical Provider, MD       Past Medical History:   Diagnosis Date    Anxiety     CAD S/P percutaneous coronary angioplasty 2015, 2018    stents per dr Carlos Davis    CHF (congestive heart failure) ([address]     CKD (chronic kidney disease) stage 4, GFR 15-29 ml/min ([address] 2/24/2018    CKD stage 4 due to type 2 diabetes mellitus ([address]     COPD (chronic obstructive pulmonary disease) ([address]     Diabetic nephropathy with proteinuria ([address] 2014    DJD (degenerative joint disease) of knee     Dr Vivi Schulz GERD (gastroesophageal reflux disease)     Hemiparesis, left ([address] 2013    entered Assisted Living ([facility])    Hemodialysis patient [facility])     History of heart failure     History of seizures     History of type C viral hepatitis     HTN (hypertension)     Hyperlipidemia     Impaired mobility and activities of daily living     Mediastinal lymphadenopathy 2013    Edward Corona    Metabolic syndrome     Moderate persistent asthma without complication 0/29/4201    Neurogenic urinary incontinence 2013    [facility] in diabetes [facility]     Obesity (BMI 30-39. 9)     Recurrent UTI     S/P colonoscopy     [facility], focal active colitis    Schizophrenia, paranoid, chronic ([facility]     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 Yeison Bhatia      x1 Dr. Leatha Khan, Dr Tia Hutchins CATH LAB PROCEDURE  10/02/2019    HYSTERECTOMY, TOTAL ABDOMINAL      one ovary intact, Dr Raheel Gallegos, menorrhagia    KS TOTAL KNEE ARTHROPLASTY Left 2018    LEFT KNEE TOTAL KNEE ARTHROPLASTY, SHAYNA, NERVE BLOCK performed by Omaira Chambers MD at [address] [place] [address] Right     TOTAL KNEE ARTHROPLASTY  16    Dr Rodriguez Ready TUNNELED [address] Right 2020    tunneled HD catheter per Dr Zohaib Mendoza Age of Onset    Cancer Mother 76        survived   Flordia Pleas Hypertension Father     Diabetes Sister     Mental Illness Sister        CareTeam (Including outside providers/suppliers regularly involved in providing care):   Patient Care Team:  Vangie Mcdonald MD as PCP - General (Family Medicine)  Vangie Mcdonald MD as PCP - [facility] Empaneled Provider  Birgit Dean MD (Urology)  Sandra Love MD as Cardiologist (Interventional Cardiology)    Wt Readings from Last 3 Encounters:   21 210 lb (95.3 kg)   21 201 lb 8 oz (91.4 kg)   03/15/21 213 lb (96.6 kg) Patient-Reported Vitals 9/23/2020   Patient-Reported Weight 219lb   Patient-Reported Height 5' 7\"   Patient-Reported Systolic 063   Patient-Reported Diastolic 80      There is no height or weight on file to calculate BMI. Based upon direct observation of the patient, evaluation of cognition reveals recent and remote memory intact. Patient's complete Health Risk Assessment and screening values have been reviewed and are found in Flowsheets. The following problems were reviewed today and where indicated follow up appointments were made and/or referrals ordered. Positive Risk Factor Screenings with Interventions:     Fall Risk:  Timed Up and Go Test > 12 seconds? (Complete if either Fall Risk answers are Yes): (!) yes  2 or more falls in past year?: (!) yes(6 falls - fell 2 weeks ago fell in bathtub; patient refuses to move to SNF)  Fall with injury in past year?: (!) yes(fractured ribs)  Fall Risk Interventions:    · Home safety tips provided; has received OT and PT numerous times        General Health and ACP:  General  In general, how would you say your health is?: Fair  In the past 7 days, have you experienced any of the following?  New or Increased Pain, New or Increased Fatigue, Loneliness, Social Isolation, Stress or Anger?: (!) New or Increased Fatigue(d/t dialysis)  Do you get the social and emotional support that you need?: Yes  Do you have a Living Will?: Yes  Advance Directives     Power of  Living Will ACP-Advance Directive ACP-Power of     Not on File Not on File Not on File Filed      General Health Risk Interventions:  · related to TIW HD    Health Habits/Nutrition:  Health Habits/Nutrition  Do you exercise for at least 20 minutes 2-3 times per week?: Yes  Have you lost any weight without trying in the past 3 months?: No  Do you eat only one meal per day?: (!) Yes  Have you seen the dentist within the past year?: N/A - wear dentures     Health Habits/Nutrition Interventions: · clarification: eats 3 meals daily    Hearing/Vision:  No exam data present  Hearing/Vision  Do you or your family notice any trouble with your hearing that hasn't been managed with hearing aids?: No  Do you have difficulty driving, watching TV, or doing any of your daily activities because of your eyesight?: (!) Yes  Have you had an eye exam within the past year?: Yes(cataract surgery tomorrow)  Hearing/Vision Interventions:  · Vision concerns:  catarectomy tomorrow     ADL:  ADLs  In the past 7 days, did you need help from others to perform any of the following everyday activities? Eating, dressing, grooming, bathing, toileting, or walking/balance?: (!) Dressing, Bathing, Walking/Balance(chronic debility, balance disorderhas [facility])  In the past 7 days, did you need help from others to take care of any of the following?  Laundry, housekeeping, banking/finances, shopping, telephone use, food preparation, transportation, or taking medications?: [employer], Housekeeping, Shopping, Food Preparation, Transportation, Banking/Finances, Taking Medications(has HH 49  hours a week)  ADL Interventions:  · Has [facility] 49 hours a weel    Personalized Preventive Plan   Current Health Maintenance Status  Immunization History   Administered Date(s) Administered    Influenza Vaccine, unspecified formulation 10/14/2016    Influenza Virus Vaccine 10/20/2014, 10/30/2015, 10/14/2016, 09/29/2017, 10/12/2018    Influenza Whole 10/20/2014    Influenza, Quadv, IM, (6 mo and older Fluzone, Flulaval, Fluarix and 3 yrs and older Afluria) 09/29/2017, 10/12/2018    Influenza, Quadv, IM, PF (6 mo and older Fluzone, Flulaval, Fluarix, and 3 yrs and older Afluria) 10/03/2019    Influenza, Triv, 3 Years and older, IM (Afluria (5 yrs and older) 10/14/2016    Pneumococcal Polysaccharide ([id number]) 10/15/2016    Tdap (Boostrix, Adacel) 08/03/2020        Health Maintenance   Topic Date Due    COVID-19 Vaccine (1) Never done    Shingles Vaccine (1 of 2) Never done    Pneumococcal 0-64 years Vaccine (3 of 3 - PCV13) 12/24/2021 (Originally 9/8/2021)    Flu vaccine (Season Ended) 09/01/2021    Annual Wellness Visit (AWV)  09/24/2021    A1C test (Diabetic or Prediabetic)  03/02/2022    Potassium monitoring  03/02/2022    Creatinine monitoring  03/02/2022    Breast cancer screen  10/14/2022    Colon cancer screen colonoscopy  01/09/2024    DTaP/Tdap/Td vaccine (2 - Td) 08/03/2030    Hepatitis A vaccine  Aged Out    Hib vaccine  Aged Out    Meningococcal (ACWY) vaccine  Aged Out     Recommendations for Building Successful Teens Due: see orders and patient instructions/AVS.  . Recommended screening schedule for the next 5-10 years is provided to the patient in written form: see Patient Instructions/AVS.    There are no diagnoses linked to this encounter. Juliette Edouard is a 59 y.o. female being evaluated by a Virtual Visit (phone) encounter to address concerns as mentioned above. A caregiver was present when appropriate. Due to this being a TeleHealth encounter (During JGEDU-29 public health emergency), evaluation of the following organ systems was limited: Vitals/Constitutional/EENT/Resp/CV/GI//MS/Neuro/Skin/Heme-Lymph-Imm. Pursuant to the emergency declaration under the [address] [place] [address], [address] [place] [address] authority and the [employer] and [employer] Act, this Virtual Visit was conducted with patient's (and/or legal guardian's) consent, to reduce the patient's risk of exposure to COVID-19 and provide necessary medical care. The patient (and/or legal guardian) has also been advised to contact this office for worsening conditions or problems, and seek emergency medical treatment and/or call 911 if deemed necessary. Patient identification was verified at the start of the visit: Yes    Services were provided through phone to substitute for in-person clinic visit.  Patient and

## 2021-05-04 NOTE — PATIENT INSTRUCTIONS
Personalized Preventive Plan for iNcole Goddard - 5/4/2021  Medicare offers a range of preventive health benefits. Some of the tests and screenings are paid in full while other may be subject to a deductible, co-insurance, and/or copay. Some of these benefits include a comprehensive review of your medical history including lifestyle, illnesses that may run in your family, and various assessments and screenings as appropriate. After reviewing your medical record and screening and assessments performed today your provider may have ordered immunizations, labs, imaging, and/or referrals for you. A list of these orders (if applicable) as well as your Preventive Care list are included within your After Visit Summary for your review. Other Preventive Recommendations:    · A preventive eye exam performed by an eye specialist is recommended every 1-2 years to screen for glaucoma; cataracts, macular degeneration, and other eye disorders. · A preventive dental visit is recommended every 6 months. · Try to get at least 150 minutes of exercise per week or 10,000 steps per day on a pedometer . · Order or download the FREE \"Exercise & Physical Activity: Your Everyday Guide\" from [employer]. Call 9-112.981.4618 or search [employer] online. · You need 1500-8662 mg of calcium and 7759-5888 IU of vitamin D per day. It is possible to meet your calcium requirement with diet alone, but a vitamin D supplement is usually necessary to meet this goal.  · When exposed to the sun, use a sunscreen that protects against both UVA and UVB radiation with an SPF of 30 or greater. Reapply every 2 to 3 hours or after sweating, drying off with a towel, or swimming. · Always wear a seat belt when traveling in a car. Always wear a helmet when riding a bicycle or motorcycle.

## 2021-05-11 DIAGNOSIS — E11.40 CHRONIC PAINFUL DIABETIC NEUROPATHY (HCC): ICD-10-CM

## 2021-05-11 NOTE — TELEPHONE ENCOUNTER
Rx request   Requested Prescriptions     Pending Prescriptions Disp Refills    pregabalin (LYRICA) 75 MG capsule 60 capsule 2     Sig: TAKE ONE (1) CAPSULE BY MOUTH TWICE DAILY     LOV 5/4/2021  Next Visit Date:  Future Appointments   Date Time Provider Aminata Cortes   6/28/2021  1:30 PM LANDON Hernandez  [address]   7/27/2021  8:00 AM Charlene Green MD [address]   5/10/2022 12:00 PM Charlene Green MD [address] [place] [address]

## 2021-05-12 RX ORDER — PREGABALIN 75 MG/1
CAPSULE ORAL
Qty: 60 CAPSULE | Refills: 2 | Status: SHIPPED | OUTPATIENT
Start: 2021-05-12 | End: 2021-07-15

## 2021-05-13 ENCOUNTER — TELEPHONE (OUTPATIENT)
Dept: FAMILY MEDICINE CLINIC | Age: 61
End: 2021-05-13

## 2021-05-14 ENCOUNTER — HOSPITAL ENCOUNTER (EMERGENCY)
Age: 61
Discharge: HOME | End: 2021-05-15
Attending: EMERGENCY MEDICINE
Payer: MEDICARE

## 2021-05-14 ENCOUNTER — APPOINTMENT (OUTPATIENT)
Dept: CT IMAGING | Age: 61
End: 2021-05-14
Payer: MEDICARE

## 2021-05-14 DIAGNOSIS — S20.211A CONTUSION OF RIGHT CHEST WALL, INITIAL ENCOUNTER: Primary | ICD-10-CM

## 2021-05-14 PROCEDURE — 99283 EMERGENCY DEPT VISIT LOW MDM: CPT

## 2021-05-14 PROCEDURE — 71250 CT THORAX DX C-: CPT

## 2021-05-14 ASSESSMENT — ENCOUNTER SYMPTOMS
SORE THROAT: 0
WHEEZING: 0
NAUSEA: 0
ABDOMINAL PAIN: 0
EYE DISCHARGE: 0
CHEST TIGHTNESS: 0
ABDOMINAL DISTENTION: 0
PHOTOPHOBIA: 0
SHORTNESS OF BREATH: 0
COUGH: 0
VOMITING: 0

## 2021-05-14 ASSESSMENT — PAIN SCALES - GENERAL: PAINLEVEL_OUTOF10: 7

## 2021-05-15 VITALS
BODY MASS INDEX: 32.49 KG/M2 | HEART RATE: 70 BPM | OXYGEN SATURATION: 100 % | RESPIRATION RATE: 22 BRPM | DIASTOLIC BLOOD PRESSURE: 47 MMHG | TEMPERATURE: 98.1 F | HEIGHT: 67 IN | SYSTOLIC BLOOD PRESSURE: 116 MMHG | WEIGHT: 207 LBS

## 2021-05-15 PROCEDURE — 6370000000 HC RX 637 (ALT 250 FOR IP): Performed by: EMERGENCY MEDICINE

## 2021-05-15 RX ORDER — HYDROCODONE BITARTRATE AND ACETAMINOPHEN 5; 325 MG/1; MG/1
1 TABLET ORAL ONCE
Status: DISCONTINUED | OUTPATIENT
Start: 2021-05-15 | End: 2021-05-15

## 2021-05-15 RX ORDER — TRAMADOL HYDROCHLORIDE 50 MG/1
50 TABLET ORAL ONCE
Status: COMPLETED | OUTPATIENT
Start: 2021-05-15 | End: 2021-05-15

## 2021-05-15 RX ADMIN — TRAMADOL HYDROCHLORIDE 50 MG: 50 TABLET, FILM COATED ORAL at 00:32

## 2021-05-15 NOTE — ED NOTES
Dr Darylene Kluver at bedside with explanation of results and possible discharge. Discharge education reviewed. Patient instructed to follow up with PCP and come back to the ED with any new or worsening symptoms. No questions or concerns at this time.          Hillary Dietz RN  05/15/21 [id number]

## 2021-05-15 NOTE — ED PROVIDER NOTES
[facility] ED  eMERGENCY dEPARTMENT eNCOUnter      Pt Name: Romeo Franks  MRN: [id number]  [place] 1960  Date of evaluation: 5/14/2021  Provider: Yanci Banks MD    CHIEF COMPLAINT     No chief complaint on file. HISTORY OF PRESENT ILLNESS   (Location/Symptom, Timing/Onset,Context/Setting, Quality, Duration, Modifying Factors, Severity)  Note limiting factors. Romeo Franks is a 59 y.o. female who presents to the emergency department for evaluation of right-sided rib pain after receiving Heimlich maneuver. Patient has had a very complicated past several months with frequent falls and multiple fractured ribs on the right. Last known rib fractures were 6 weeks ago right-sided. She is on dialysis for end-stage renal disease and probably has new osteoporosis. Tonight she was eating a pot roast and was choking on the the roast requiring Heimlich maneuver to be given by family. He had no loss of consciousness. However now she is complaining of right-sided rib pain. No shortness of breath. No vomiting. No abdominal pain. HPI    NursingNotes were reviewed. REVIEW OF SYSTEMS    (2-9 systems for level 4, 10 or more for level 5)     Review of Systems   Constitutional: Negative for chills and diaphoresis. HENT: Negative for congestion, ear pain, mouth sores and sore throat. Eyes: Negative for photophobia and discharge. Respiratory: Negative for cough, chest tightness, shortness of breath and wheezing. Cardiovascular: Negative for chest pain and palpitations. Gastrointestinal: Negative for abdominal distention, abdominal pain, nausea and vomiting. Endocrine: Negative for cold intolerance. Genitourinary: Negative for difficulty urinating. Musculoskeletal: Negative for arthralgias. Skin: Negative for pallor and rash. Allergic/Immunologic: Negative for immunocompromised state. Neurological: Negative for dizziness and syncope.    Hematological: Negative for adenopathy. Psychiatric/Behavioral: Negative for agitation and hallucinations. All other systems reviewed and are negative. Except as noted above the remainder of the review of systems was reviewed and negative. PAST MEDICAL HISTORY     Past Medical History:   Diagnosis Date    Anxiety     CAD S/P percutaneous coronary angioplasty 2015, 2018    stents per dr Mohsen Rhoades    CH[facility]estive [facility] failure) [facility]     CKD (chronic kidney disease) stage 4, GFR 15-29 ml/min ([address] 2/24/2018    CKD stage 4 due to type 2 diabetes mellitus ([address]     COPD (chronic obstructive pulmonary disease) ([address]     Diabetic nephropathy with proteinuria ([address] 2014    DJD (degenerative joint disease) of knee     Dr Radha Ramos GERD (gastroesophageal reflux disease)     Hemiparesis, left ([facility] 2013    entered Assisted Living ([facility])    Hemod[facility] patient [facility])     History of heart failure     History of seizures     History of type C viral hepatitis     HTN (hypertension)     Hyperlipidemia     Impaired mobility and activities of daily living     Mediastinal lymphadenopathy 2013    Amber Roque    Metabolic syndrome     Moderate persistent asthma without complication 5/00/5139    Neurogenic urinary incontinence 2013    Neuropathy in diabetes ([address]     Obesity (BMI 30-39. 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 Mily Pinto      x1 Dr. Aaron Brand, Dr Maxwell Simon    [address] CATH LAB PROCEDURE  10/02/2019    HYSTERECTOMY, TOTAL ABDOMINAL      one ovary intact, Dr Jerod Zuniga, menorrhagia    NC TOTAL KNEE ARTHROPLASTY Left 2018    LEFT KNEE TOTAL KNEE ARTHROPLASTY, SHAYNA, NERVE BLOCK performed by Leslie Vance MD at [address] [place] [address] Right     TOTAL KNEE ARTHROPLASTY  16    Dr Meena Eddy TUNNELED [address] Right 2020    tunneled HD catheter per Dr Kristopher Abdul       Previous Medications    ACETAMINOPHEN (TYLENOL) 325 MG TABLET    Take 2 tablets by mouth every 4 hours as needed for Pain (For mild to moderate pain (Pain 1-6 out of 10 on pain scale))    ALBUTEROL SULFATE HFA (VENTOLIN HFA) 108 (90 BASE) MCG/ACT INHALER    Inhale 2 puffs into the lungs every 6 hours as needed for Wheezing    ALCOHOL SWABS (EASY TOUCH ALCOHOL PREP MEDIUM) 70 % PADS    USE AS DIRECTED THREE TIMES A DAY    AMLODIPINE (NORVASC) 10 MG TABLET    TAKE 1 TABLET BY MOUTH DAILY    ARIPIPRAZOLE (ABILIFY) 5 MG TABLET    TAKE 1 TABLET BY MOUTH ONCE DAILY    ASPIRIN 81 MG TABLET    Take 1 tablet by mouth daily    ATORVASTATIN (LIPITOR) 40 MG TABLET    Take 1 tablet by mouth daily    B COMPLEX-C-FOLIC ACID (NEPHRO VITAMINS) 0.8 MG TABS    Take 1 tablet by mouth daily     BLOOD GLUCOSE MONITORING SUPPL (FREESTYLE LITE) CORETTA    1 Device by Does not apply route daily as needed (Diabetes) Use freestyle meter to test blood sugar as needed    BLOOD GLUCOSE TEST STRIPS (FREESTYLE LITE) STRIP    1 each by Does not apply route 4 times daily (before meals and nightly) As needed.     BRILINTA 90 MG TABS TABLET    TAKE 1 TABLET BY MOUTH 2 TIMES DAILY    DOCUSATE SODIUM (COLACE, DULCOLAX) 100 MG CAPS    Take 100 mg by mouth 2 times daily For the prevention and TWICE DAILY AS DIRECTED    VITAMIN B-12 (CYANOCOBALAMIN) 100 MCG TABLET    Take 1 tablet by mouth daily       ALLERGIES     Codeine and Oxycontin [oxycodone hcl]    FAMILY HISTORY       Family History   Problem Relation Age of Onset    Cancer Mother 76        survived   Diane Valencia Hypertension Father     Diabetes Sister     Mental Illness Sister           SOCIAL HISTORY       Social History     Socioeconomic History    Marital status:      Spouse name: None    Number of children: 2    Years of education: None    Highest education level: None   Occupational History    Occupation: disabled   Social Needs    Financial resource strain: None    Food insecurity     Worry: None     Inability: None    Transportation needs     Medical: None     Non-medical: None   Tobacco Use    Smoking status: Passive Smoke Exposure - Never Smoker    Smokeless tobacco: Never Used   Substance and Sexual Activity    Alcohol use: No     Alcohol/week: 0.0 standard drinks    Drug use: No    Sexual activity: Not Currently   Lifestyle    Physical activity     Days per week: None     Minutes per session: None    Stress: None   Relationships    Social connections     Talks on phone: None     Gets together: None     Attends Jainism service: None     Active member of club or organization: None     Attends meetings of clubs or organizations: None     Relationship status: None    Intimate partner violence     Fear of current or ex partner: None     Emotionally abused: None     Physically abused: None     Forced sexual activity: None   Other Topics Concern    None   Social History Narrative    Born in [place], one of 5    Twin sister Reyes, very ill in 2018, [place] [address]    Moved to [facility], , 2 children, one son and one daughter    Worked at [employer], as a nurse's aide    Disabled due to mental illness    Lived at [facility], was discharged, returned to independent living in 2017 in the daughter's house and has adjusted well    One son and one daughter, live in the same house with patient, Hetal Salgado pays the rent    [employer] reading ([place])       SCREENINGS    [place] Coma Scale  Eye Opening: Spontaneous  Best Verbal Response: Oriented  Best Motor Response: Obeys commands  [place] Coma Scale Score: 15 @FLOW([id number])@      PHYSICAL EXAM    (up to 7 for level 4, 8 or more for level 5)     ED Triage Vitals [05/14/21 2255]   BP Temp Temp Source Pulse Resp SpO2 Height Weight   (!) 112/59 98.1 °F (36.7 °C) Oral 73 22 95 % 5' 7\" (1.702 m) 207 lb (93.9 kg)       Physical Exam  Vitals signs and nursing note reviewed. Constitutional:       Appearance: She is well-developed. HENT:      Head: Normocephalic. Nose: Nose normal.      Mouth/Throat:      Mouth: Mucous membranes are moist.   Eyes:      Conjunctiva/sclera: Conjunctivae normal.      Pupils: Pupils are equal, round, and reactive to light. Neck:      Musculoskeletal: Normal range of motion and neck supple. Cardiovascular:      Rate and Rhythm: Normal rate and regular rhythm. Heart sounds: Normal heart sounds. Pulmonary:      Effort: Pulmonary effort is normal.      Breath sounds: Normal breath sounds. Abdominal:      General: Bowel sounds are normal.      Palpations: Abdomen is soft. Tenderness: There is no abdominal tenderness. There is no guarding. Musculoskeletal: Normal range of motion. Arms:    Skin:     General: Skin is warm and dry. Capillary Refill: Capillary refill takes less than 2 seconds. Neurological:      Mental Status: She is alert and oriented to person, place, and time.    Psychiatric:         Mood and Affect: Mood normal.         DIAGNOSTIC RESULTS     EKG: All EKG's are interpreted by the Emergency Department Physician who either signs or Co-signsthis chart in the absence of a cardiologist.      RADIOLOGY:   Non-plain filmimages such as CT, Ultrasound and MRI are read by the radiologist. Plain radiographic images are visualized and preliminarily interpreted by the emergency physician with the below findings:    Interpretation per the Radiologist below, if available at the time ofthis note:    LalyMinh April [address]    (Results Pending)         ED BEDSIDE ULTRASOUND:   Performed by ED Physician - none    LABS:  Labs Reviewed - No data to display    All other labs were within normal range or not returned as of this dictation. EMERGENCY DEPARTMENT COURSE and DIFFERENTIAL DIAGNOSIS/MDM:   Vitals:    Vitals:    05/14/21 2255 05/14/21 2258 05/14/21 2259   BP: (!) 112/59     Pulse: 73  90   Resp: 22     Temp: 98.1 °F (36.7 °C)     TempSrc: Oral     SpO2: 95% 100%    Weight: 207 lb (93.9 kg)     Height: 5' 7\" (1.702 m)          MDM patient's CT of the chest was negative for acute injury. It shows the old rib fractures that are unchanged. No other acute pathology. She is discharged home improved. CONSULTS:  None    PROCEDURES:  Unless otherwise noted below, none     Procedures    FINAL IMPRESSION      1. Contusion of right chest wall, initial encounter          DISPOSITION/PLAN   DISPOSITION Decision To Discharge 05/15/2021 12:29:58 AM      PATIENT REFERRED TO:  No follow-up provider specified.     DISCHARGE MEDICATIONS:  New Prescriptions    No medications on file          (Please note that portions of this note were completed with a voice recognition program.  Efforts were made to edit the dictations but occasionally words are mis-transcribed.)    Caroline Sinclair MD (electronically signed)  Attending Emergency Physician         Caroline Sinclair MD  05/15/21 0030

## 2021-05-15 NOTE — ED TRIAGE NOTES
Shon  February with broken ribs, went home from [facility], fell in tub 1 month ago and had another broken rib, was given heimlich maneuver today due to choking, now having pain to right side ribs

## 2021-05-17 ENCOUNTER — TELEPHONE (OUTPATIENT)
Dept: FAMILY MEDICINE CLINIC | Age: 61
End: 2021-05-17

## 2021-05-17 ENCOUNTER — CARE COORDINATION (OUTPATIENT)
Dept: CARE COORDINATION | Age: 61
End: 2021-05-17

## 2021-05-17 DIAGNOSIS — Z78.0 MENOPAUSE: Primary | ICD-10-CM

## 2021-05-17 DIAGNOSIS — Z87.81 FREQUENT FRACTURES OF BONE: ICD-10-CM

## 2021-05-17 DIAGNOSIS — R13.10 DYSPHAGIA, UNSPECIFIED TYPE: ICD-10-CM

## 2021-05-17 NOTE — TELEPHONE ENCOUNTER
I have written an external referral for speech therapy through this company to evaluate.   And I have written for a DEXA scan

## 2021-05-17 NOTE — CARE COORDINATION
Ambulatory Care Coordination Note  5/17/2021  CM Risk Score: 17  Charlson 10 Year Mortality Risk Score: 100%     ACC: Stefan Rincon, FLAQUITO    Summary Note: Phone call to [facility] for an ER follow up and to discuss care coordination and my role, unable to reach [facility], contact number is not working properly, I mailed introductory letter to [facility] and will reach out to her at later date.

## 2021-05-18 NOTE — TELEPHONE ENCOUNTER
Called daughter Prem Perales, as requested by [facility]. She is aware of pcp note and referrals ordered. No other questions. Also LVM for Michelle to let her know of referrals as well.

## 2021-05-19 ENCOUNTER — TELEPHONE (OUTPATIENT)
Dept: FAMILY MEDICINE CLINIC | Age: 61
End: 2021-05-19

## 2021-05-19 RX ORDER — ARIPIPRAZOLE 5 MG/1
TABLET ORAL
Qty: 30 TABLET | Refills: 2 | Status: SHIPPED | OUTPATIENT
Start: 2021-05-19 | End: 2021-10-22 | Stop reason: SDUPTHER

## 2021-05-19 NOTE — TELEPHONE ENCOUNTER
Patient aware of note and aware of walk in. She states she will get otc. No other questions at this time.

## 2021-05-19 NOTE — TELEPHONE ENCOUNTER
Pt calling to ask if pcp will write a script for benadryl? She is aware she can get otc. Her body is itching and she believes it is caused from switching her laundry detergent. Please advise and thank you.

## 2021-05-19 NOTE — TELEPHONE ENCOUNTER
Rx request   Requested Prescriptions     Pending Prescriptions Disp Refills    metoprolol tartrate (LOPRESSOR) 25 MG tablet 30 tablet 1     Sig: Take 0.5 tablets by mouth 2 times daily     LOV 5/4/2021  Next Visit Date:  Future Appointments   Date Time Provider Aminata Cortes   5/21/2021 11:40 AM YUSEF BONE DENSITY RM [facility] Fac RAD   6/28/2021  1:30 PM Andrei Blankenship MD [address] [place] [address]   7/27/2021  8:00 AM Jeremiah Morrell MD [facility] Y[facility]   5/10/2022 12:00 PM Jeremiah Morrell MD [address] [place] [address]

## 2021-05-19 NOTE — TELEPHONE ENCOUNTER
I am sorry that Doneta Sender is having itching all over her body. It is concerning to me that this is happening and I would rather she be evaluated for a new problem .

## 2021-05-21 ENCOUNTER — HOSPITAL ENCOUNTER (OUTPATIENT)
Dept: WOMENS IMAGING | Age: 61
Discharge: HOME | End: 2021-05-23
Payer: MEDICARE

## 2021-05-21 DIAGNOSIS — Z78.0 MENOPAUSE: ICD-10-CM

## 2021-05-21 DIAGNOSIS — Z87.81 FREQUENT FRACTURES OF BONE: ICD-10-CM

## 2021-05-21 PROCEDURE — 77080 DXA BONE DENSITY AXIAL: CPT

## 2021-05-25 ENCOUNTER — TELEPHONE (OUTPATIENT)
Dept: FAMILY MEDICINE CLINIC | Age: 61
End: 2021-05-25

## 2021-05-25 DIAGNOSIS — I10 ESSENTIAL (PRIMARY) HYPERTENSION: Primary | ICD-10-CM

## 2021-05-25 RX ORDER — ASPIRIN 81 MG/1
81 TABLET ORAL DAILY
Qty: 30 TABLET | Refills: 12 | Status: SHIPPED | OUTPATIENT
Start: 2021-05-25

## 2021-05-25 RX ORDER — AMLODIPINE BESYLATE 10 MG/1
10 TABLET ORAL DAILY
Qty: 30 TABLET | Refills: 3 | Status: SHIPPED | OUTPATIENT
Start: 2021-05-25 | End: 2021-08-17

## 2021-05-25 NOTE — TELEPHONE ENCOUNTER
[facility] pharmacy calling to refill Aspirin 81 mg tablet for patient. I received the following message when I tried to pend. \"The following medication records are no longer available for ordering. Place a new order with a different medication record. \"    Please advise and thank you.

## 2021-05-26 DIAGNOSIS — E11.40 CHRONIC PAINFUL DIABETIC NEUROPATHY (HCC): ICD-10-CM

## 2021-05-26 RX ORDER — PREGABALIN 75 MG/1
CAPSULE ORAL
Qty: 60 CAPSULE | Refills: 2 | OUTPATIENT
Start: 2021-05-26 | End: 2021-08-23

## 2021-05-26 NOTE — TELEPHONE ENCOUNTER
Rx request   Requested Prescriptions     Pending Prescriptions Disp Refills    pregabalin (LYRICA) 75 MG capsule 60 capsule 2     Sig: TAKE ONE (1) CAPSULE BY MOUTH TWICE DAILY     LOV 5/4/2021  Next Visit Date:  Future Appointments   Date Time Provider Aminata Cortes   6/28/2021  1:30 PM LANDON Hernandez  [address]   7/27/2021  8:00 AM Cristiana Pisano MD [facility] EMERGENCY [facility] AT [place]   5/10/2022 12:00 PM Cristiana Pisano MD [address] [place] [address]

## 2021-05-26 NOTE — TELEPHONE ENCOUNTER
Patient called informed that she has two refills and she can call pharmacy to get it filled.     MU ELAM

## 2021-06-09 ENCOUNTER — TELEPHONE (OUTPATIENT)
Dept: FAMILY MEDICINE CLINIC | Age: 61
End: 2021-06-09

## 2021-06-09 NOTE — TELEPHONE ENCOUNTER
Patient is aware of [facility] recommendations and has no other questions at this time.

## 2021-06-09 NOTE — TELEPHONE ENCOUNTER
Patient is calling to ask if pcp will send a prescription to the pharmacy for diarrhea. She states she has had this for 2 days and she cannot eat or drink anything. I informed patient of the Walk In Care, but she is worried about leaving the house. Pharmacy: [facility], [address] [place] [address]    Please advise and thank you.

## 2021-06-09 NOTE — TELEPHONE ENCOUNTER
I am sorry that Blake Hinton is not feeling well because of diarrhea. Her health is relatively delicate and, because of that, I am not comfortable treating diarrhea with anything other than Pepto-Bismol over-the-counter until she is evaluated. I would recommend she go to the emergency department for further evaluation.

## 2021-06-17 ENCOUNTER — CARE COORDINATION (OUTPATIENT)
Dept: CARE COORDINATION | Age: 61
End: 2021-06-17

## 2021-06-17 NOTE — CARE COORDINATION
Ambulatory Care Coordination Note  6/17/2021  CM Risk Score: 17  Charlson 10 Year Mortality Risk Score: 100%     ACC: Iglesia Mendoza RN    Summary Note: SECOND ATTEMPT- I  Called Michelle to discuss care coordination and my role, no answer, I left a message with the nature of my call and requested call back. Intro letter had been sent previously.           Future Appointments   Date Time Provider Aminata Cortes   6/28/2021  1:30 PM LANDON Hernandez MD [address] [place] [address]   7/27/2021  8:00 AM Samra Cuenca MD [facility]   5/10/2022 12:00 PM Samra Cuenca MD [address] [place] [address]

## 2021-06-21 DIAGNOSIS — E11.65 UNCONTROLLED TYPE 2 DIABETES MELLITUS WITH HYPERGLYCEMIA (HCC): ICD-10-CM

## 2021-06-21 LAB
ANION GAP SERPL CALCULATED.3IONS-SCNC: 22 MEQ/L (ref 9–15)
BUN BLDV-MCNC: 83 MG/DL (ref 8–23)
CALCIUM SERPL-MCNC: 9.4 MG/DL (ref 8.5–9.9)
CHLORIDE BLD-SCNC: 86 MEQ/L (ref 95–107)
CO2: 22 MEQ/L (ref 20–31)
CREAT SERPL-MCNC: 8.47 MG/DL (ref 0.5–0.9)
GFR AFRICAN AMERICAN: 5.7
GFR NON-AFRICAN AMERICAN: 4.8
GLUCOSE BLD-MCNC: 203 MG/DL (ref 70–99)
HBA1C MFR BLD: 6.4 % (ref 4.8–5.9)
POTASSIUM SERPL-SCNC: 3.6 MEQ/L (ref 3.4–4.9)
SODIUM BLD-SCNC: 130 MEQ/L (ref 135–144)

## 2021-06-23 DIAGNOSIS — F51.04 PSYCHOPHYSIOLOGICAL INSOMNIA: ICD-10-CM

## 2021-06-23 RX ORDER — MELATONIN 10 MG
10 CAPSULE ORAL NIGHTLY
Qty: 30 CAPSULE | Refills: 12 | Status: SHIPPED | OUTPATIENT
Start: 2021-06-23 | End: 2022-03-16 | Stop reason: SDUPTHER

## 2021-06-24 ENCOUNTER — CARE COORDINATION (OUTPATIENT)
Dept: CARE COORDINATION | Age: 61
End: 2021-06-24

## 2021-06-24 NOTE — CARE COORDINATION
Ambulatory Care Coordination Note  6/24/2021  CM Risk Score: 9  Charlson 10 Year Mortality Risk Score: 100%     ACC: Ai Martinez, RN    Summary Note: Third attempt, Phone call attempt to patient today to follow up to discuss Care Coordination and my role. Left vm message and requested phone call back. Intro letter was mailed previously.           Future Appointments   Date Time Provider Aminata Cortes   6/28/2021  1:30 PM Nay Stevens MD [address] [place] [address]   7/27/2021  8:00 AM MD Aaron Araujo [address] [place], [place] [address]   5/10/2022 12:00 PM Deangelo León MD [address] [place] [address]

## 2021-06-28 ENCOUNTER — TELEPHONE (OUTPATIENT)
Dept: FAMILY MEDICINE CLINIC | Age: 61
End: 2021-06-28

## 2021-06-28 ENCOUNTER — OFFICE VISIT (OUTPATIENT)
Dept: ENDOCRINOLOGY | Age: 61
End: 2021-06-28
Payer: MEDICARE

## 2021-06-28 VITALS
DIASTOLIC BLOOD PRESSURE: 58 MMHG | WEIGHT: 219.2 LBS | BODY MASS INDEX: 34.4 KG/M2 | OXYGEN SATURATION: 95 % | HEART RATE: 61 BPM | HEIGHT: 67 IN | SYSTOLIC BLOOD PRESSURE: 90 MMHG

## 2021-06-28 DIAGNOSIS — E11.65 UNCONTROLLED TYPE 2 DIABETES MELLITUS WITH HYPERGLYCEMIA (HCC): Primary | ICD-10-CM

## 2021-06-28 LAB
CHP ED QC CHECK: NORMAL
GLUCOSE BLD-MCNC: 292 MG/DL

## 2021-06-28 PROCEDURE — 82962 GLUCOSE BLOOD TEST: CPT | Performed by: INTERNAL MEDICINE

## 2021-06-28 PROCEDURE — 2022F DILAT RTA XM EVC RTNOPTHY: CPT | Performed by: INTERNAL MEDICINE

## 2021-06-28 PROCEDURE — 3044F HG A1C LEVEL LT 7.0%: CPT | Performed by: INTERNAL MEDICINE

## 2021-06-28 PROCEDURE — 1036F TOBACCO NON-USER: CPT | Performed by: INTERNAL MEDICINE

## 2021-06-28 PROCEDURE — 99213 OFFICE O/P EST LOW 20 MIN: CPT | Performed by: INTERNAL MEDICINE

## 2021-06-28 PROCEDURE — G8427 DOCREV CUR MEDS BY ELIG CLIN: HCPCS | Performed by: INTERNAL MEDICINE

## 2021-06-28 PROCEDURE — 3017F COLORECTAL CA SCREEN DOC REV: CPT | Performed by: INTERNAL MEDICINE

## 2021-06-28 PROCEDURE — G8417 CALC BMI ABV UP PARAM F/U: HCPCS | Performed by: INTERNAL MEDICINE

## 2021-06-28 RX ORDER — INSULIN GLARGINE 100 [IU]/ML
INJECTION, SOLUTION SUBCUTANEOUS
Qty: 10 PEN | Refills: 10 | Status: SHIPPED | OUTPATIENT
Start: 2021-06-28 | End: 2021-10-08 | Stop reason: SDUPTHER

## 2021-06-28 RX ORDER — INSULIN ASPART 100 [IU]/ML
INJECTION, SOLUTION INTRAVENOUS; SUBCUTANEOUS
Qty: 10 PEN | Refills: 3 | Status: ON HOLD | OUTPATIENT
Start: 2021-06-28 | End: 2022-07-01

## 2021-06-28 NOTE — TELEPHONE ENCOUNTER
Patient calling because she believes she has a UTI. She states it burns when she urinates and has discharge. She is asking if a prescription can be sent to her pharmacy. Pharmacy: [address]    Please advise and thank you.

## 2021-06-28 NOTE — PROGRESS NOTES
2021    Assessment:       Diagnosis Orders   1. Uncontrolled type 2 diabetes mellitus with hyperglycemia (HCC)  POCT Glucose    Basic Metabolic Panel    Hemoglobin A1C         PLAN:     Increase insulin dose blood sugar target 122 180  Orders Placed This Encounter   Medications    LANTUS SOLOSTAR 100 UNIT/ML injection pen     Si units at bedtime     Dispense:  10 pen     Refill:  10    insulin aspart (NOVOLOG FLEXPEN) 100 UNIT/ML injection pen     Sig: INJECT 8-10  units with each meals     Dispense:  10 pen     Refill:  3           Orders Placed This Encounter   Procedures    POCT Glucose     Subjective:     Chief Complaint   Patient presents with    Diabetes     Vitals:    21 1335 21 1341 21 1349   BP: 98/63 (!) 87/55 (!) 90/58   Pulse: 61     SpO2: 95%     Weight: 219 lb 3.2 oz (99.4 kg)     Height: 5' 7\" (1.702 m)       Wt Readings from Last 3 Encounters:   21 219 lb 3.2 oz (99.4 kg)   21 207 lb (93.9 kg)   21 210 lb (95.3 kg)     BP Readings from Last 3 Encounters:   21 (!) 90/58   05/15/21 (!) 116/47   21 107/67     Follow-up on type 2 diabetes complications include neuropathy renal failure currently on Lantus 45 at night Humalog 6 with each meals blood sugars are higher lately due to diet and lack of activity    Diabetes  She presents for her follow-up diabetic visit. She has type 2 diabetes mellitus. Her disease course has been worsening. Associated symptoms include fatigue. Diabetic complications include heart disease, nephropathy and peripheral neuropathy. Current diabetic treatment includes insulin injections. She is currently taking insulin pre-breakfast, pre-lunch, pre-dinner and at bedtime. Her overall blood glucose range is 180-200 mg/dl.  (Lab Results       Component                Value               Date                       LABA1C                   6.4 (H)             2021            )     Past Medical History:   Diagnosis Date PA TOTAL KNEE ARTHROPLASTY Left 6/21/2018    LEFT KNEE TOTAL KNEE ARTHROPLASTY, SHAYNA, NERVE BLOCK performed by Lizzeth Retana MD at [address] TOE AMPUTATION Right     TOTAL KNEE ARTHROPLASTY  05/19/16    Dr Page Rosenthal TUNNELED [address] Right 07/01/2020    tunneled HD catheter per Dr Charla Osborne Marital status:      Spouse name: Not on file    Number of children: 2    Years of education: Not on file    Highest education level: Not on file   Occupational History    Occupation: disabled   Tobacco Use    Smoking status: Passive Smoke Exposure - Never Smoker    Smokeless tobacco: Never Used   Vaping Use    Vaping Use: Never used   Substance and Sexual Activity    Alcohol use: No     Alcohol/week: 0.0 standard drinks    Drug use: No    Sexual activity: Not Currently   Other Topics Concern    Not on file   Social History Narrative    Born in [place], one of 5    Twin sister Reyes, very ill in 2018, [place] [id number]    Moved to [facility], , 2 children, one son and one daughter    Worked at [employer], as a nurse's aide    Disabled due to mental illness    Lived at [facility], was discharged, returned to independent living in 2017 in the daughter's house and has adjusted well    One son and one daughter, live in the same house with patient, Libertad Hobbs pays the rent    Hobbies reading (misteries)     Social Determinants of Health     Financial Resource Strain:     Difficulty of Paying Living Expenses:    Food Insecurity:     Worried About Running Out of Food in the Last Year:     [address] in the Last Year:    Transportation Needs:     Lack of Transportation (Medical):      Lack of Transportation (Non-Medical):    Physical Activity:     Days of Exercise per Week:     Minutes of Exercise per Session:    Stress:     Feeling of Stress :    Social Connections:     Frequency of Communication with Friends and Family:     Frequency of Social Gatherings with Friends and Family:     Attends [employer]oration[employer] Services:     Active Member of Clubs or Organizations:     Attends Club or Organization Meetings:     Marital Status:    Intimate Partner Violence:     Fear of Current or Ex-Partner:     Emotionally Abused:     Physically Abused:     Sexually Abused:      Family History   Problem Relation Age of Onset    Cancer Mother 76        survived   Trini Eden Hypertension Father     Diabetes Sister     Mental Illness Sister      Allergies   Allergen Reactions    Codeine Hives     hives    Oxycontin [Oxycodone Hcl] Hives       Current Outpatient Medications:     melatonin 10 MG CAPS capsule, Take 1 capsule by mouth nightly, Disp: 30 capsule, Rfl: 12    amLODIPine (NORVASC) 10 MG tablet, TAKE 1 TABLET BY MOUTH DAILY, Disp: 30 tablet, Rfl: 3    aspirin EC 81 MG EC tablet, Take 1 tablet by mouth daily, Disp: 30 tablet, Rfl: 12    metoprolol tartrate (LOPRESSOR) 25 MG tablet, Take 0.5 tablets by mouth 2 times daily, Disp: 90 tablet, Rfl: 0    ARIPiprazole (ABILIFY) 5 MG tablet, TAKE 1 TABLET BY MOUTH ONCE DAILY, Disp: 30 tablet, Rfl: 2    pregabalin (LYRICA) 75 MG capsule, TAKE ONE (1) CAPSULE BY MOUTH TWICE DAILY, Disp: 60 capsule, Rfl: 2    magnesium oxide (MAG-OX) 400 MG tablet, Take 1 tablet by mouth daily, Disp: 90 tablet, Rfl: 4    atorvastatin (LIPITOR) 40 MG tablet, Take 1 tablet by mouth daily, Disp: 90 tablet, Rfl: 4    Insulin Pen Needle (SURE COMFORT PEN NEEDLES) 30G X 8 MM MISC, USE AS DIRECTED FIVE TIMES A DAILY, Disp: 100 each, Rfl: 10    fluconazole (DIFLUCAN) 150 MG tablet, Take one tab let now and repeat in 48 hours if symptoms persists; do not take statin cholesterol medicine on the days you take this medicine, Disp: 2 tablet, Rfl: 1    blood glucose test strips (FREESTYLE LITE) strip, 1 each by Does not apply route 4 times daily (before meals and nightly) As needed. , Disp: 200 strip, Rfl: 5    LANTUS SOLOSTAR 100 UNIT/ML injection pen, Inject 45 Units into the skin nightly, Disp: 15 mL, Rfl: 10    insulin aspart (NOVOLOG FLEXPEN) 100 UNIT/ML injection pen, INJECT 6 units with each meals, Disp: 15 mL, Rfl: 10    Alcohol Swabs (EASY TOUCH ALCOHOL PREP MEDIUM) 70 % PADS, USE AS DIRECTED THREE TIMES A DAY, Disp: 100 each, Rfl: 3    FreeStyle Lancets MISC, Test 4x daily, Disp: 150 each, Rfl: 3    Heparin Sodium, Porcine, (HEPARIN, PORCINE,) 5000 UNIT/ML injection, Inject 1 mL into the skin every 8 hours For the prevention of blood clots in convalescent setting while increasing ambulation. , Disp: 45 mL, Rfl: 0    docusate sodium (COLACE, DULCOLAX) 100 MG CAPS, Take 100 mg by mouth 2 times daily For the prevention and treatment of constipation while taking pain medications. , Disp: 30 capsule, Rfl: 0    acetaminophen (TYLENOL) 325 MG tablet, Take 2 tablets by mouth every 4 hours as needed for Pain (For mild to moderate pain (Pain 1-6 out of 10 on pain scale)), Disp: 120 tablet, Rfl: 0    triamcinolone (KENALOG) 0.1 % cream, APPLY TO AFFECTED AREAS TWICE DAILY AS DIRECTED, Disp: 80 g, Rfl: 10    nystatin (NYAMYC) 803647 UNIT/GM powder, APPLY TO ABDOMINAL FOLDS EVERY 12 HOURS AS NEEDED, Disp: 60 g, Rfl: 10    vitamin B-12 (CYANOCOBALAMIN) 100 MCG tablet, Take 1 tablet by mouth daily (Patient taking differently: Take 100 mcg by mouth daily At night), Disp: 30 tablet, Rfl: 10    Misc.  Devices (BARIATRIC ROLLATOR) MISC, Rolllator with a basket, Disp: 1 each, Rfl: 0    Blood Glucose Monitoring Suppl (FREESTYLE LITE) CORETTA, 1 Device by Does not apply route daily as needed (Diabetes) Use freestyle meter to test blood sugar as needed, Disp: 1 Device, Rfl: 0    ranolazine (RANEXA) 500 MG extended release tablet, Take 1 tablet by mouth 2 times daily, Disp: 180 tablet, Rfl: 2    BRILINTA 90 MG TABS tablet, TAKE 1 TABLET BY MOUTH 2 TIMES DAILY, Disp: 60 tablet, Rfl: 11    pantoprazole (PROTONIX) 20 MG tablet, TAKE 1 TABLET BY MOUTH DAILY, Disp: 90 tablet, Rfl: 3    B Complex-C-Folic Acid (NEPHRO VITAMINS) 0.8 MG TABS, Take 1 tablet by mouth daily , Disp: , Rfl:     albuterol sulfate HFA (VENTOLIN HFA) 108 (90 Base) MCG/ACT inhaler, Inhale 2 puffs into the lungs every 6 hours as needed for Wheezing, Disp: , Rfl:     sertraline (ZOLOFT) 50 MG tablet, TAKE 1 TABLET BY MOUTH DAILY, Disp: 90 tablet, Rfl: 3    fluticasone (FLOVENT HFA) 110 MCG/ACT inhaler, Inhale 2 puffs into the lungs 2 times daily, Disp: 1 Inhaler, Rfl: 12    Misc.  Devices [facility]) MIS, To use with transport outside of the house., Disp: 1 each, Rfl: 0    isosorbide mononitrate (IMDUR) 60 MG extended release tablet, Take 1 tablet by mouth daily, Disp: 90 tablet, Rfl: 3    nitroGLYCERIN (NITROSTAT) 0.4 MG SL tablet, Place 1 tablet under the tongue every 5 minutes as needed for Chest pain, Disp: , Rfl:   Lab Results   Component Value Date     (L) 06/21/2021    K 3.6 06/21/2021    CL 86 (L) 06/21/2021    CO2 22 06/21/2021    BUN 83 (HH) 06/21/2021    CREATININE 8.47 (HH) 06/21/2021    GLUCOSE 292 06/28/2021    CALCIUM 9.4 06/21/2021    PROT 8.4 (H) 02/16/2021    LABALBU 4.6 02/16/2021    BILITOT 1.0 (H) 02/16/2021    ALKPHOS 127 02/16/2021    AST 23 02/16/2021    ALT 16 02/16/2021    LABGLOM 4.8 (L) 06/21/2021    GFRAA 5.7 (L) 06/21/2021    GLOB 3.8 (H) 02/16/2021     Lab Results   Component Value Date    WBC 9.9 05/12/2021    HGB 11.5 (L) 05/12/2021    HCT 33.5 (L) 05/12/2021    MCV 89 05/12/2021     05/12/2021     Lab Results   Component Value Date    LABA1C 6.4 (H) 06/21/2021    LABA1C 6.5 (H) 03/02/2021    LABA1C 7.3 (H) 01/20/2021     Lab Results   Component Value Date    HDL 48 06/11/2020    HDL 44 05/25/2020    HDL 55 02/26/2019    LDLCALC 37 06/11/2020    LDLCALC 46 05/25/2020    LDLCALC 59 02/26/2019    CHOL 101 06/11/2020    CHOL 107 05/25/2020    CHOL 137 02/26/2019    TRIG 82 06/11/2020    TRIG 85 05/25/2020    TRIG 116 02/26/2019 Review of Systems   Constitutional: Positive for fatigue. Endocrine: Negative. All other systems reviewed and are negative. Objective:   Physical Exam  Vitals reviewed. Constitutional:       Appearance: Normal appearance. She is obese. HENT:      Head: Normocephalic and atraumatic. Hair is normal.      Right Ear: External ear normal.      Left Ear: External ear normal.      Nose: Nose normal.   Eyes:      General: No scleral icterus. Right eye: No discharge. Left eye: No discharge. Extraocular Movements: Extraocular movements intact. Conjunctiva/sclera: Conjunctivae normal.   Neck:      Trachea: Trachea normal.   Cardiovascular:      Rate and Rhythm: Normal rate. Pulmonary:      Effort: Pulmonary effort is normal.   Musculoskeletal:         General: Normal range of motion. Cervical back: Normal range of motion and neck supple. Neurological:      General: No focal deficit present. Mental Status: She is alert. Psychiatric:         Mood and Affect: Mood is anxious.

## 2021-07-01 ENCOUNTER — CARE COORDINATION (OUTPATIENT)
Dept: CARE COORDINATION | Age: 61
End: 2021-07-01

## 2021-07-01 NOTE — CARE COORDINATION
Received message from [facility] requesting to contact patient as she is looking to get into a SNF for therapy due to falls. Called patient 3 times at 9:08 am, 10:19 am and 1:09pm  and left a message. Will try to reach patient again next week.

## 2021-07-06 ENCOUNTER — CARE COORDINATION (OUTPATIENT)
Dept: CARE COORDINATION | Age: 61
End: 2021-07-06

## 2021-07-06 NOTE — CARE COORDINATION
Ambulatory Care Coordination Note  7/6/2021  CM Risk Score: 17  Charlson 10 Year Mortality Risk Score: 100%     ACC: Colin Sebastian, RN     Summary Note: I received message from the office of Dr. Agus Holder stating that Teresita Garcia was requesting assistance with placement at a SNF. I called Teresita Garcia to discuss, she did not answer- so I called her EC and daughter Johanna Willard- she answered and gave the phone to her mother. Teresita Garcia states she has been having falls recently and would like to go to a SNF for therapy, [facility] needed some documentation and information to start the process. Per Teresita Garcia, they need a current History and Physical, demographic list, med list, prescription/order for SNF and therapy evaluation. I asked King Villarreal to follow up with [facility] to inquire on what specifically they needed the order to say, and verify what documentation was needed from Dr. Hamida Zepeda. I sent message to Dr. Agus Holder and Lexie-  requesting an office visit asap to get this process started, awaiting to hear back from them. I discussed care coordination and my role  with Teresita Garcia, I will hold off on enrollment if patient will be going to a SNF. Will follow and assist as needed for SNF placement, if she does not go to a SNF, I will enroll in Care Coordination. I forwarded [facility] contact information to  Lexie, I made her aware that Leni Levy at [facility] is helping coordinate this admission-I sent message to Lexie inquiring if I can help with placement issue, I requested PIERCE Sparrow assist with scheduling office visit to discuss SNF placement. Goals Addressed    None         Prior to Admission medications    Medication Sig Start Date End Date Taking?  Authorizing Provider   LANTUS SOLOSTAR 100 UNIT/ML injection pen 55 units at bedtime 6/28/21   Jack Moran MD   insulin aspart (NOVOLOG FLEXPEN) 100 UNIT/ML injection pen INJECT 8-10  units with each meals 6/28/21   Jack Moran MD melatonin 10 MG CAPS capsule Take 1 capsule by mouth nightly 6/23/21   Robbin Gillette MD   amLODIPine (NORVASC) 10 MG tablet TAKE 1 TABLET BY MOUTH DAILY 5/25/21   Marielena Boyer MD   aspirin EC 81 MG EC tablet Take 1 tablet by mouth daily 5/25/21   Robbin Gillette MD   metoprolol tartrate (LOPRESSOR) 25 MG tablet Take 0.5 tablets by mouth 2 times daily 5/19/21   Robbin Gillette MD   ARIPiprazole (ABILIFY) 5 MG tablet TAKE 1 TABLET BY MOUTH ONCE DAILY 5/19/21   Robbin Gillette MD   pregabalin (LYRICA) 75 MG capsule TAKE ONE (1) CAPSULE BY MOUTH TWICE DAILY 5/12/21 8/9/21  Robbin Gillette MD   magnesium oxide (MAG-OX) 400 MG tablet Take 1 tablet by mouth daily 4/28/21   Robbin Gillette MD   atorvastatin (LIPITOR) 40 MG tablet Take 1 tablet by mouth daily 4/28/21   Robbin Gillette MD   Insulin Pen Needle (SURE COMFORT PEN NEEDLES) 30G X 8 MM MISC USE AS DIRECTED FIVE TIMES A DAILY 4/20/21   MARCELO Malhotra   fluconazole (DIFLUCAN) 150 MG tablet Take one tab let now and repeat in 48 hours if symptoms persists; do not take statin cholesterol medicine on the days you take this medicine 4/15/21   Robbin Gillette MD   blood glucose test strips (FREESTYLE LITE) strip 1 each by Does not apply route 4 times daily (before meals and nightly) As needed. 3/12/21   MARCELO Malhotra   Alcohol Swabs (EASY TOUCH ALCOHOL PREP MEDIUM) 70 % PADS USE AS DIRECTED THREE TIMES A DAY 3/12/21   MARCELO Malhotra   FreeStyle Lancets MISC Test 4x daily 3/11/21   MARCELO Malhotra   Heparin Sodium, Porcine, (HEPARIN, PORCINE,) 5000 UNIT/ML injection Inject 1 mL into the skin every 8 hours For the prevention of blood clots in convalescent setting while increasing ambulation.  2/24/21   Lena Dixon PA-C   docusate sodium (COLACE, DULCOLAX) 100 MG CAPS Take 100 mg by mouth 2 times daily For the prevention and treatment of Kaden Cabrera MD   isosorbide mononitrate (IMDUR) 60 MG extended release tablet Take 1 tablet by mouth daily 7/27/20   Amelia Dias MD   nitroGLYCERIN (NITROSTAT) 0.4 MG SL tablet Place 1 tablet under the tongue every 5 minutes as needed for Chest pain 9/22/15   Historical Provider, MD       Future Appointments   Date Time Provider Aminata Quinterosi   7/26/2021  2:30 PM Kaden Cabrera MD [facility]   9/27/2021  2:00 PM Paige Lobo MD [address] [place] [address]   5/10/2022 12:00 PM Kaden Cabrera MD [address] [place] [address]

## 2021-07-06 NOTE — CARE COORDINATION
Received phone call from patient stating that she would still like to go into a nursing home. Inquired if patient had a nusing home in mind that she would like. Patient reports that she wants to go to [facility] due to her falls. Patient shared that she fell twice while at dialysis and they recommenced that she go to a nursing home. Patient reports that she called Ignacia Tsai already to see what she may need sent over to stat admission process. Patient states she will need physical, medication list and a order to admit. Informed patient that this writer will call Ignacia Tsai to start process. Patient reports that Lacey Dorman is working on getting her an appointment.

## 2021-07-06 NOTE — CARE COORDINATION
Called FLASH Avery to share information requested for admission to [facility]. FLASH has been in contact with staff at PCP office who reports that they will work with [place] [facility] to get what is needed. FLASH reports that if she needs this writer for assistance she will reach out.

## 2021-07-07 ENCOUNTER — CARE COORDINATION (OUTPATIENT)
Dept: CARE COORDINATION | Age: 61
End: 2021-07-07

## 2021-07-07 ENCOUNTER — TELEPHONE (OUTPATIENT)
Dept: FAMILY MEDICINE CLINIC | Age: 61
End: 2021-07-07

## 2021-07-07 ENCOUNTER — OFFICE VISIT (OUTPATIENT)
Dept: FAMILY MEDICINE CLINIC | Age: 61
End: 2021-07-07
Payer: MEDICARE

## 2021-07-07 VITALS
HEIGHT: 67 IN | OXYGEN SATURATION: 98 % | WEIGHT: 219.4 LBS | RESPIRATION RATE: 20 BRPM | BODY MASS INDEX: 34.44 KG/M2 | DIASTOLIC BLOOD PRESSURE: 42 MMHG | TEMPERATURE: 98.2 F | SYSTOLIC BLOOD PRESSURE: 92 MMHG | HEART RATE: 68 BPM

## 2021-07-07 DIAGNOSIS — I10 ESSENTIAL (PRIMARY) HYPERTENSION: ICD-10-CM

## 2021-07-07 DIAGNOSIS — I27.20 PULMONARY HYPERTENSION, UNSPECIFIED (HCC): ICD-10-CM

## 2021-07-07 DIAGNOSIS — I36.1 NONRHEUMATIC TRICUSPID VALVE REGURGITATION: Chronic | ICD-10-CM

## 2021-07-07 DIAGNOSIS — N18.6 END STAGE RENAL DISEASE (HCC): ICD-10-CM

## 2021-07-07 DIAGNOSIS — I25.119 ATHEROSCLEROSIS OF NATIVE CORONARY ARTERY OF NATIVE HEART WITH ANGINA PECTORIS (HCC): ICD-10-CM

## 2021-07-07 DIAGNOSIS — G24.01 TARDIVE DYSKINESIA: Chronic | ICD-10-CM

## 2021-07-07 DIAGNOSIS — G81.94 HEMIPARESIS, LEFT (HCC): Primary | ICD-10-CM

## 2021-07-07 DIAGNOSIS — R29.6 FALLS FREQUENTLY: ICD-10-CM

## 2021-07-07 DIAGNOSIS — Z78.9 NEED FOR EXTENDED CARE FACILITY: Chronic | ICD-10-CM

## 2021-07-07 DIAGNOSIS — N30.01 ACUTE CYSTITIS WITH HEMATURIA: ICD-10-CM

## 2021-07-07 DIAGNOSIS — F20.0 SCHIZOPHRENIA, PARANOID, CHRONIC (HCC): ICD-10-CM

## 2021-07-07 DIAGNOSIS — G62.81 CRITICAL ILLNESS POLYNEUROPATHY (HCC): ICD-10-CM

## 2021-07-07 DIAGNOSIS — R53.1 WEAKNESS: ICD-10-CM

## 2021-07-07 DIAGNOSIS — R26.2 DIFFICULTY IN WALKING: ICD-10-CM

## 2021-07-07 DIAGNOSIS — I34.0 NONRHEUMATIC MITRAL VALVE REGURGITATION: Chronic | ICD-10-CM

## 2021-07-07 DIAGNOSIS — I50.32 CHRONIC DIASTOLIC CONGESTIVE HEART FAILURE (HCC): Chronic | ICD-10-CM

## 2021-07-07 DIAGNOSIS — J43.1 PANLOBULAR EMPHYSEMA (HCC): ICD-10-CM

## 2021-07-07 DIAGNOSIS — E11.65 UNCONTROLLED TYPE 2 DIABETES MELLITUS WITH HYPERGLYCEMIA (HCC): ICD-10-CM

## 2021-07-07 DIAGNOSIS — J45.40 MODERATE PERSISTENT ASTHMA WITHOUT COMPLICATION: Chronic | ICD-10-CM

## 2021-07-07 LAB
BILIRUBIN, POC: ABNORMAL
BLOOD URINE, POC: ABNORMAL
CLARITY, POC: ABNORMAL
COLOR, POC: ABNORMAL
GLUCOSE URINE, POC: ABNORMAL
KETONES, POC: ABNORMAL
LEUKOCYTE EST, POC: ABNORMAL
NITRITE, POC: ABNORMAL
PH, POC: 5.5
PROTEIN, POC: ABNORMAL
SPECIFIC GRAVITY, POC: 1.02
UROBILINOGEN, POC: ABNORMAL

## 2021-07-07 PROCEDURE — G8427 DOCREV CUR MEDS BY ELIG CLIN: HCPCS | Performed by: FAMILY MEDICINE

## 2021-07-07 PROCEDURE — G8926 SPIRO NO PERF OR DOC: HCPCS | Performed by: FAMILY MEDICINE

## 2021-07-07 PROCEDURE — 3017F COLORECTAL CA SCREEN DOC REV: CPT | Performed by: FAMILY MEDICINE

## 2021-07-07 PROCEDURE — 2022F DILAT RTA XM EVC RTNOPTHY: CPT | Performed by: FAMILY MEDICINE

## 2021-07-07 PROCEDURE — G8417 CALC BMI ABV UP PARAM F/U: HCPCS | Performed by: FAMILY MEDICINE

## 2021-07-07 PROCEDURE — 99215 OFFICE O/P EST HI 40 MIN: CPT | Performed by: FAMILY MEDICINE

## 2021-07-07 PROCEDURE — 1036F TOBACCO NON-USER: CPT | Performed by: FAMILY MEDICINE

## 2021-07-07 PROCEDURE — 3023F SPIROM DOC REV: CPT | Performed by: FAMILY MEDICINE

## 2021-07-07 PROCEDURE — 81002 URINALYSIS NONAUTO W/O SCOPE: CPT | Performed by: FAMILY MEDICINE

## 2021-07-07 PROCEDURE — 3044F HG A1C LEVEL LT 7.0%: CPT | Performed by: FAMILY MEDICINE

## 2021-07-07 RX ORDER — SULFAMETHOXAZOLE AND TRIMETHOPRIM 800; 160 MG/1; MG/1
1 TABLET ORAL 2 TIMES DAILY
Qty: 14 TABLET | Refills: 0 | Status: SHIPPED | OUTPATIENT
Start: 2021-07-07 | End: 2021-07-14

## 2021-07-07 SDOH — ECONOMIC STABILITY: FOOD INSECURITY: WITHIN THE PAST 12 MONTHS, THE FOOD YOU BOUGHT JUST DIDN'T LAST AND YOU DIDN'T HAVE MONEY TO GET MORE.: NEVER TRUE

## 2021-07-07 SDOH — ECONOMIC STABILITY: FOOD INSECURITY: WITHIN THE PAST 12 MONTHS, YOU WORRIED THAT YOUR FOOD WOULD RUN OUT BEFORE YOU GOT MONEY TO BUY MORE.: NEVER TRUE

## 2021-07-07 SDOH — ECONOMIC STABILITY: TRANSPORTATION INSECURITY
IN THE PAST 12 MONTHS, HAS LACK OF TRANSPORTATION KEPT YOU FROM MEETINGS, WORK, OR FROM GETTING THINGS NEEDED FOR DAILY LIVING?: NO

## 2021-07-07 SDOH — ECONOMIC STABILITY: TRANSPORTATION INSECURITY
IN THE PAST 12 MONTHS, HAS THE LACK OF TRANSPORTATION KEPT YOU FROM MEDICAL APPOINTMENTS OR FROM GETTING MEDICATIONS?: NO

## 2021-07-07 ASSESSMENT — SOCIAL DETERMINANTS OF HEALTH (SDOH): HOW HARD IS IT FOR YOU TO PAY FOR THE VERY BASICS LIKE FOOD, HOUSING, MEDICAL CARE, AND HEATING?: NOT HARD AT ALL

## 2021-07-07 NOTE — CARE COORDINATION
Ambulatory Care Coordination Note  7/7/2021  CM Risk Score: 17  Charlson 10 Year Mortality Risk Score: 100%     ACC: Ninoska Whitlock, RN    Summary Note: Received message from Lexie at Dr. Akbar Alvarado's office stating that Rylee Bazan had been scheduled for an office visit on 7/7/21 at 10 am, I called Urmila's dtr- Angelina and made her aware of date and time of office visit, she will be taking her mother to the appointment this am. I gave Tobi Eye my contact information and advised if she had any other needs in assisting her mother to get into [place], she could call me and I would assist as needed. I will not enroll Urmila in Care Coordination at this time as the plan is for her to go to a SNF for therapy. Prior to Admission medications    Medication Sig Start Date End Date Taking?  Authorizing Provider   LANTUS SOLOSTAR 100 UNIT/ML injection pen 55 units at bedtime 6/28/21   Janeen Fuchs MD   insulin aspart (NOVOLOG FLEXPEN) 100 UNIT/ML injection pen INJECT 8-10  units with each meals 6/28/21   Janeen Fuchs MD   melatonin 10 MG CAPS capsule Take 1 capsule by mouth nightly 6/23/21   Elena Pierre MD   amLODIPine (NORVASC) 10 MG tablet TAKE 1 TABLET BY MOUTH DAILY 5/25/21   Nancy Lizama MD   aspirin EC 81 MG EC tablet Take 1 tablet by mouth daily 5/25/21   Elena Pierre MD   metoprolol tartrate (LOPRESSOR) 25 MG tablet Take 0.5 tablets by mouth 2 times daily 5/19/21   Elena Pierre MD   ARIPiprazole (ABILIFY) 5 MG tablet TAKE 1 TABLET BY MOUTH ONCE DAILY 5/19/21   Elena Pierre MD   pregabalin (LYRICA) 75 MG capsule TAKE ONE (1) CAPSULE BY MOUTH TWICE DAILY 5/12/21 8/9/21  Elena Pierre MD   magnesium oxide (MAG-OX) 400 MG tablet Take 1 tablet by mouth daily 4/28/21   Elena Pierre MD   atorvastatin (LIPITOR) 40 MG tablet Take 1 tablet by mouth daily 4/28/21   Elena Pierre MD   Insulin Pen Needle (SURE COMFORT PEN NEEDLES) 30G X 8 MM MISC USE AS DIRECTED FIVE TIMES A DAILY 4/20/21   MARCELO Gomez   fluconazole (DIFLUCAN) 150 MG tablet Take one tab let now and repeat in 48 hours if symptoms persists; do not take statin cholesterol medicine on the days you take this medicine 4/15/21   Marta Dean MD   blood glucose test strips (FREESTYLE LITE) strip 1 each by Does not apply route 4 times daily (before meals and nightly) As needed. 3/12/21   MARCELO Gomez   Alcohol Swabs (EASY TOUCH ALCOHOL PREP MEDIUM) 70 % PADS USE AS DIRECTED THREE TIMES A DAY 3/12/21   MARCELO Gomez   FreeStyle Lancets MISC Test 4x daily 3/11/21   MARCELO Gomez   Heparin Sodium, Porcine, (HEPARIN, PORCINE,) 5000 UNIT/ML injection Inject 1 mL into the skin every 8 hours For the prevention of blood clots in convalescent setting while increasing ambulation. 2/24/21   Lena Dixon PA-C   docusate sodium (COLACE, DULCOLAX) 100 MG CAPS Take 100 mg by mouth 2 times daily For the prevention and treatment of constipation while taking pain medications. 2/24/21   Lena Dixon PA-C   acetaminophen (TYLENOL) 325 MG tablet Take 2 tablets by mouth every 4 hours as needed for Pain (For mild to moderate pain (Pain 1-6 out of 10 on pain scale)) 2/24/21   Lena Dixon PA-C   triamcinolone (KENALOG) 0.1 % cream APPLY TO AFFECTED AREAS TWICE DAILY AS DIRECTED 1/20/21   Marta Dean MD   [facility]) [id number] UNIT/GM powder APPLY TO ABDOMINAL FOLDS EVERY 12 HOURS AS NEEDED 1/20/21   Marta Dean MD   vitamin B-12 (CYANOCOBALAMIN) 100 MCG tablet Take 1 tablet by mouth daily  Patient taking differently: Take 100 mcg by mouth daily At night 1/18/21 1/18/22  Mariela Staples PA-C   Misc.  Devices (BARIATRIC ROLLATOR) MISC Rolllator with a basket 1/12/21   Marta Dean MD   Blood Glucose Monitoring Suppl (FREESTYLE LITE) CORETTA 1 Device by Does not apply route daily as needed (Diabetes) Use freestyle meter to test blood sugar as needed 12/29/20   MARCELO Ramos   ranolazine (RANEXA) 500 MG extended release tablet Take 1 tablet by mouth 2 times daily 11/24/20   Phylicia Palomares MD   BRILINTA 90 MG TABS tablet TAKE 1 TABLET BY MOUTH 2 TIMES DAILY 11/2/20   Rina Wall MD   pantoprazole (PROTONIX) 20 MG tablet TAKE 1 TABLET BY MOUTH DAILY 11/2/20   Rina Wall MD   B Complex-C-Folic Acid (NEPHRO VITAMINS) 0.8 MG TABS Take 1 tablet by mouth daily     Historical Provider, MD   albuterol sulfate HFA (VENTOLIN HFA) 108 (90 Base) MCG/ACT inhaler Inhale 2 puffs into the lungs every 6 hours as needed for Wheezing    Historical Provider, MD   sertraline (ZOLOFT) 50 MG tablet TAKE 1 TABLET BY MOUTH DAILY 9/23/20   Moriah Daley MD   fluticasone (FLOVENT HFA) 110 MCG/ACT inhaler Inhale 2 puffs into the lungs 2 times daily 9/23/20 9/23/21  Moriah Daley MD   Misc. Devices [facility]) MISC To use with transport outside of the house.  9/8/20   Moriah Daley MD   isosorbide mononitrate (IMDUR) 60 MG extended release tablet Take 1 tablet by mouth daily 7/27/20   Phylicia Palomares MD   nitroGLYCERIN (NITROSTAT) 0.4 MG SL tablet Place 1 tablet under the tongue every 5 minutes as needed for Chest pain 9/22/15   Historical Provider, MD       Future Appointments   Date Time Provider Aminata Cortes   7/7/2021 10:00 AM [facility]   7/26/2021  2:30 PM Moriah Daley MD [facility]   9/27/2021  2:00 PM Justine Acuña MD [facility]   5/10/2022 12:00 PM Moriah Daley MD [address] [place] [address]

## 2021-07-07 NOTE — CARE COORDINATION
Ambulatory Care Coordination Note  7/7/2021  CM Risk Score: 17  Charlson 10 Year Mortality Risk Score: 100%     ACC: Contreras Enriquez, RN    Summary Note: Received phone call from Eric Carlisle, states she has the order for SNF, I advised I would send message to - [employer], as she had been in contact with Faustino Stewart and was assisting with this matter. Prior to Admission medications    Medication Sig Start Date End Date Taking?  Authorizing Provider   sulfamethoxazole-trimethoprim (BACTRIM DS) 800-160 MG per tablet Take 1 tablet by mouth 2 times daily for 7 days 7/7/21 7/14/21  Hamida Galeana MD   LANTUS SOLOSTAR 100 UNIT/ML injection pen 55 units at bedtime 6/28/21   Becca Rush MD   insulin aspart (NOVOLOG FLEXPEN) 100 UNIT/ML injection pen INJECT 8-10  units with each meals 6/28/21   Becca Rush MD   melatonin 10 MG CAPS capsule Take 1 capsule by mouth nightly 6/23/21   Hamida Galeana MD   amLODIPine (NORVASC) 10 MG tablet TAKE 1 TABLET BY MOUTH DAILY 5/25/21   Tess Orosco MD   aspirin EC 81 MG EC tablet Take 1 tablet by mouth daily 5/25/21   Hamida Galeana MD   metoprolol tartrate (LOPRESSOR) 25 MG tablet Take 0.5 tablets by mouth 2 times daily 5/19/21   Hamida Galeana MD   ARIPiprazole (ABILIFY) 5 MG tablet TAKE 1 TABLET BY MOUTH ONCE DAILY 5/19/21   Hamida Galeana MD   pregabalin (LYRICA) 75 MG capsule TAKE ONE (1) CAPSULE BY MOUTH TWICE DAILY 5/12/21 8/9/21  Hamida Gaelana MD   magnesium oxide (MAG-OX) 400 MG tablet Take 1 tablet by mouth daily 4/28/21   Hamida Galeana MD   atorvastatin (LIPITOR) 40 MG tablet Take 1 tablet by mouth daily 4/28/21   Hamida Galeana MD   Insulin Pen Needle (SURE COMFORT PEN NEEDLES) 30G X 8 MM MISC USE AS DIRECTED FIVE TIMES A DAILY 4/20/21   MARCELO Mascorro   fluconazole (DIFLUCAN) 150 MG tablet Take one tab let now and repeat in 48 hours if symptoms persists; do not take statin cholesterol medicine on the days you take this medicine  Patient not taking: Reported on 7/7/2021 4/15/21   Belem Jamison MD   blood glucose test strips (FREESTYLE LITE) strip 1 each by Does not apply route 4 times daily (before meals and nightly) As needed. 3/12/21   MARCELO Wiley   Alcohol Swabs (EASY TOUCH ALCOHOL PREP MEDIUM) 70 % PADS USE AS DIRECTED THREE TIMES A DAY 3/12/21   MARCELO Wiley   FreeStyle Lancets MISC Test 4x daily 3/11/21   MARCELO Wiley   Heparin Sodium, Porcine, (HEPARIN, PORCINE,) 5000 UNIT/ML injection Inject 1 mL into the skin every 8 hours For the prevention of blood clots in convalescent setting while increasing ambulation. 2/24/21   Lena Dixon PA-C   docusate sodium (COLACE, DULCOLAX) 100 MG CAPS Take 100 mg by mouth 2 times daily For the prevention and treatment of constipation while taking pain medications. 2/24/21   Lena Dixon PA-C   acetaminophen (TYLENOL) 325 MG tablet Take 2 tablets by mouth every 4 hours as needed for Pain (For mild to moderate pain (Pain 1-6 out of 10 on pain scale)) 2/24/21   Lena Dixon PA-C   triamcinolone (KENALOG) 0.1 % cream APPLY TO AFFECTED AREAS TWICE DAILY AS DIRECTED 1/20/21   Belem Jamison MD   [facility]) 438026 UNIT/GM powder APPLY TO ABDOMINAL FOLDS EVERY 12 HOURS AS NEEDED 1/20/21   Belem Jamison MD   vitamin B-12 (CYANOCOBALAMIN) 100 MCG tablet Take 1 tablet by mouth daily  Patient taking differently: Take 100 mcg by mouth daily At night 1/18/21 1/18/22  Mariela Staples PA-C   Misc.  Devices (BARIATRIC ROLLATOR) MISC Rolllator with a basket 1/12/21   Belem Jamison MD   Blood Glucose Monitoring Suppl (FREESTYLE LITE) CORETTA 1 Device by Does not apply route daily as needed (Diabetes) Use freestyle meter to test blood sugar as needed 12/29/20   MARCELO Wiley   ranolazine (RANEXA) 500 MG extended release tablet Take 1

## 2021-07-07 NOTE — PROGRESS NOTES
[address] [facility], 59 y.o. female presents today with:  Chief Complaint   Patient presents with    Other     snff placement physcial            HPI    Frequent falls - Feels weak when she walks and fears falling. Fell lat week at dialysis and one week prior fell at home. No LOC, no injury this time. Wants to go back to NH to get \"skilled therapy. \"  Needs help with bathing (has fallen in shower); daughter assist with washing;daughter makes all her meals; does not require assist with toileting. Has occasional incontinence of stool and urine (wears pull ups). Patient is here for follow-up of CAD. Gets chest pain at night when she is lying down. No shortness of breath, palpitations, edema, paroxysmal nocturnal dyspnea, orthopnea. Is compliant with meds which do not cause significant side effects. Avoids salt. No exercise d/t debility. Dysuria. Has burning with burning urine. Increased urination. No fevers or abdominal. No emesis. Chronic nausea.      No other questions and or concerns for today's visit          Past Medical History:   Diagnosis Date    Anxiety     CAD S/P percutaneous coronary angioplasty 2015, 2018    stents per dr Natty Quiñones CHF (congestive heart failure) ([address]     CKD (chronic kidney disease) stage 4, GFR 15-29 ml/min ([address] 2/24/2018    CKD stage 4 due to type 2 diabetes mellitus ([address]     COPD (chronic obstructive pulmonary disease) ([address]     Diabetic nephropathy with proteinuria ([address] 2014    DJD (degenerative joint disease) of knee     Dr Ayad Bob GERD (gastroesophageal reflux disease)     Hemiparesis, left ([address] 2013    entered Assisted Living ([facility])    Hemodialysis patient [facility])     History of heart failure     History of seizures     History of type C viral hepatitis     HTN (hypertension)     Hyperlipidemia     Impaired mobility and activities of daily living     Mediastinal lymphadenopathy 2013    Dr Paramjit Ambrosio Metabolic syndrome     Moderate persistent asthma without complication 3314    Need for extended care facility 2021    Neurogenic urinary incontinence 2013    Neuropathy in diabetes [facility])     Nonrheumatic mitral valve regurgitation 2021    Nonrheumatic tricuspid valve regurgitation 2021    Obesity (BMI 30-39. 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 Rhonda Puente      x1 Dr. Savi Meyer, Dr Aurora Gómez CATH LAB PROCEDURE  10/02/2019    HYSTERECTOMY, TOTAL ABDOMINAL      one ovary intact, Dr Velvet Shelby, menorrhagia    [place] TOTAL KNEE ARTHROPLASTY Left 2018    LEFT KNEE TOTAL KNEE ARTHROPLASTY, SHAYNA, NERVE BLOCK performed by Lorraine Whitney MD at [address] [place] [address] Right     TOTAL KNEE ARTHROPLASTY  16    Dr Lisa Thomas TUN[address]ELED [address] Right 2020    tunneled HD catheter per Dr Lino Munoz Marital status:      Spouse name: Not on file    Number of children: 2    Years of education: Not on file    Highest education level: Not on file   Occupational History    Occupation: disabled   Tobacco Use    Smoking status: Passive Smoke Exposure - Never Smoker    Smokeless tobacco: Never Used   Vaping Use    Vaping Use: Never used   Substance and Sexual Activity    Alcohol use: No     Alcohol/week: 0.0 standard drinks    Drug use: No    Sexual activity: Not Currently   Other Topics Concern    Not on file   Social History Narrative    Born in [place], one of 5    Twin sister Reyes, very ill in 2018, [place] [address]    Moved to [facility], , 2 children, one son and one daughter    Worked at [employer], as a nurse's aide    Disabled due to mental illness    Lived at [facility], was discharged, returned to independent living in 2017 in the daughter's house and has adjusted well    One son and one daughter, live in the same house with patient, Debbi Rocha pays the rent    [employer] reading (misteries)     Social Determinants of Health     Financial Resource Strain: Low Risk     Difficulty of Paying Living Expenses: Not hard at all   Food Insecurity: No Food Insecurity    Worried About [address] in the Last Year: Never true    [address] [place] in the Last Year: Never true   Transportation Needs: No Transportation Needs    Lack of Transportation (Medical): No    Lack of Transportation (Non-Medical):  No   Physical Activity:     Days of Exercise per Week:     Minutes of Exercise per Session:    Stress:     Feeling of Stress :    Social Connections:     Frequency of Communication with Friends and Family:     Frequency of Social Gatherings with Friends and Family:     Attends Latter day Services:     Active Member of Clubs or Organizations:     Attends Club or Organization Meetings:     Marital Status:    Intimate Partner Violence:     Fear of Current or Ex-Partner:     Emotionally Abused:     Physically Abused:     Sexually Abused:      Family History   Problem Relation Age of Onset    Cancer Mother 76        survived   Efe Loser Hypertension Father     Diabetes Sister     Mental Illness Sister      Allergies   Allergen Reactions    Codeine Hives     hives    Oxycontin [Oxycodone Hcl] Hives Current Outpatient Medications   Medication Sig Dispense Refill    LANTUS SOLOSTAR 100 UNIT/ML injection pen 55 units at bedtime 10 pen 10    insulin aspart (NOVOLOG FLEXPEN) 100 UNIT/ML injection pen INJECT 8-10  units with each meals 10 pen 3    melatonin 10 MG CAPS capsule Take 1 capsule by mouth nightly 30 capsule 12    amLODIPine (NORVASC) 10 MG tablet TAKE 1 TABLET BY MOUTH DAILY 30 tablet 3    aspirin EC 81 MG EC tablet Take 1 tablet by mouth daily 30 tablet 12    metoprolol tartrate (LOPRESSOR) 25 MG tablet Take 0.5 tablets by mouth 2 times daily 90 tablet 0    ARIPiprazole (ABILIFY) 5 MG tablet TAKE 1 TABLET BY MOUTH ONCE DAILY 30 tablet 2    pregabalin (LYRICA) 75 MG capsule TAKE ONE (1) CAPSULE BY MOUTH TWICE DAILY 60 capsule 2    magnesium oxide (MAG-OX) 400 MG tablet Take 1 tablet by mouth daily 90 tablet 4    atorvastatin (LIPITOR) 40 MG tablet Take 1 tablet by mouth daily 90 tablet 4    Insulin Pen Needle (SURE COMFORT PEN NEEDLES) 30G X 8 MM MISC USE AS DIRECTED FIVE TIMES A DAILY 100 each 10    blood glucose test strips (FREESTYLE LITE) strip 1 each by Does not apply route 4 times daily (before meals and nightly) As needed. 200 strip 5    Alcohol Swabs (EASY TOUCH ALCOHOL PREP MEDIUM) 70 % PADS USE AS DIRECTED THREE TIMES A  each 3    FreeStyle Lancets MISC Test 4x daily 150 each 3    Heparin Sodium, Porcine, (HEPARIN, PORCINE,) 5000 UNIT/ML injection Inject 1 mL into the skin every 8 hours For the prevention of blood clots in convalescent setting while increasing ambulation. 45 mL 0    docusate sodium (COLACE, DULCOLAX) 100 MG CAPS Take 100 mg by mouth 2 times daily For the prevention and treatment of constipation while taking pain medications.  30 capsule 0    acetaminophen (TYLENOL) 325 MG tablet Take 2 tablets by mouth every 4 hours as needed for Pain (For mild to moderate pain (Pain 1-6 out of 10 on pain scale)) 120 tablet 0    triamcinolone (KENALOG) 0.1 % cream APPLY TO AFFECTED AREAS TWICE DAILY AS DIRECTED 80 g 10    nystatin (NYAMYC) 594796 UNIT/GM powder APPLY TO ABDOMINAL FOLDS EVERY 12 HOURS AS NEEDED 60 g 10    vitamin B-12 (CYANOCOBALAMIN) 100 MCG tablet Take 1 tablet by mouth daily (Patient taking differently: Take 100 mcg by mouth daily At night) 30 tablet 10    Misc. Devices (BARIATRIC ROLLATOR) MISC Rolllator with a basket 1 each 0    Blood Glucose Monitoring Suppl (FREESTYLE LITE) CORETTA 1 Device by Does not apply route daily as needed (Diabetes) Use freestyle meter to test blood sugar as needed 1 Device 0    ranolazine (RANEXA) 500 MG extended release tablet Take 1 tablet by mouth 2 times daily 180 tablet 2    BRILINTA 90 MG TABS tablet TAKE 1 TABLET BY MOUTH 2 TIMES DAILY 60 tablet 11    pantoprazole (PROTONIX) 20 MG tablet TAKE 1 TABLET BY MOUTH DAILY 90 tablet 3    B Complex-C-Folic Acid (NEPHRO VITAMINS) 0.8 MG TABS Take 1 tablet by mouth daily       albuterol sulfate HFA (VENTOLIN HFA) 108 (90 Base) MCG/ACT inhaler Inhale 2 puffs into the lungs every 6 hours as needed for Wheezing      sertraline (ZOLOFT) 50 MG tablet TAKE 1 TABLET BY MOUTH DAILY 90 tablet 3    fluticasone (FLOVENT HFA) 110 MCG/ACT inhaler Inhale 2 puffs into the lungs 2 times daily 1 Inhaler 12    [facility]. Devices [facility]) [facility] To use with transport outside of the house. 1 each 0    isosorbide mononitrate (IMDUR) 60 MG extended release tablet Take 1 tablet by mouth daily 90 tablet 3    nitroGLYCERIN (NITROSTAT) 0.4 MG SL tablet Place 1 tablet under the tongue every 5 minutes as needed for Chest pain      fluconazole (DIFLUCAN) 150 MG tablet Take one tab let now and repeat in 48 hours if symptoms persists; do not take statin cholesterol medicine on the days you take this medicine (Patient not taking: Reported on 7/7/2021) 2 tablet 1     No current facility-administered medications for this visit. PMH, Surgical Hx, Family Hx, and Social Hxreviewed and updated.   [facility] Maintenance reviewed. Objective    Vitals:    07/07/21 1010   BP: (!) 92/42   Site: Left Upper Arm   Position: Sitting   Cuff Size: Large Adult   Pulse: 68   Resp: 20   Temp: 98.2 °F (36.8 °C)   TempSrc: Temporal   SpO2: 98%   Weight: 219 lb 6.4 oz (99.5 kg)   Height: 5' 7\" (1.702 m)        Physical Exam  Constitutional:       General: She is not in acute distress. Appearance: She is well-developed. HENT:      Head: Normocephalic and atraumatic. Eyes:      General: No scleral icterus. Conjunctiva/sclera: Conjunctivae normal.   Cardiovascular:      Rate and Rhythm: Normal rate and regular rhythm. Heart sounds: Normal heart sounds. Pulmonary:      Effort: Pulmonary effort is normal. No respiratory distress. Breath sounds: No wheezing or rales. Comments: Diffusely diminished  Abdominal:      General: Bowel sounds are normal. There is no distension. Palpations: Abdomen is soft. There is no mass. Tenderness: There is no abdominal tenderness. Skin:     General: Skin is warm and dry. Neurological:      Mental Status: She is alert and oriented to person, place, and time. Comments: Flat facies and affect, mild LUE cogwheeling; R>L pill-rolling tremor; wide, unstable, short-strided gait with mild ataxia   Psychiatric:         Attention and Perception: Attention and perception normal.         Mood and Affect: Affect is flat. Speech: Speech is delayed. Behavior: Behavior is slowed. Behavior is cooperative. Cognition and Memory: Cognition is impaired. Judgment: Judgment is inappropriate.            Lab Results   Component Value Date    LABA1C 6.4 (H) 06/21/2021    LABA1C 6.5 (H) 03/02/2021    LABA1C 7.3 (H) 01/20/2021     Lab Results   Component Value Date    LABMICR 183.30 (H) 08/20/2019    CREATININE 8.47 (HH) 06/21/2021     Lab Results   Component Value Date    ALT 16 02/16/2021    AST 23 02/16/2021     Lab Results   Component Value Date    CHOL 101 06/11/2020    TRIG 82 06/11/2020    HDL 48 06/11/2020    LDLCALC 37 06/11/2020        Assessment & Plan   Visit Diagnoses and Associated Orders     Hemiparesis, left ([facility]    -  Primary         End stage renal disease ([facility]             Falls frequently             Difficulty in walking             Schizophrenia, paranoid, chronic ([facility])             Weakness             Tardive dyskinesia             Critical illness polyneuropathy ([facility]             Uncontrolled type 2 diabetes mellitus with hyperglycemia ([facility])             Moderate persistent asthma without complication             Panlobular emphysema ([facility])             Chronic diastolic congestive heart failure ([facility]             Essential (primary) hypertension             Pulmonary hypertension, unspecified ([facility]             Atherosclerosis of native coronary artery of native heart with angina pectoris ([facility]             Nonrheumatic mitral valve regurgitation             Nonrheumatic tricuspid valve regurgitation             Need for extended care facility             Acute cystitis with hematuria        POCT Urinalysis no Micro [[id number] Custom]      Culture, Urine [[id number] Custom]   - Future Order               Lab Results   Component Value Date    WBC 9.9 05/12/2021    HGB 11.5 (L) 05/12/2021    HCT 33.5 (L) 05/12/2021     05/12/2021    CHOL 101 06/11/2020    TRIG 82 06/11/2020    HDL 48 06/11/2020    ALT 16 02/16/2021    AST 23 02/16/2021     (L) 06/21/2021    K 3.6 06/21/2021    CL 86 (L) 06/21/2021    CREATININE 8.47 (HH) 06/21/2021    BUN 83 (HH) 06/21/2021    CO2 22 06/21/2021    TSH 0.454 06/28/2020    INR 1.2 02/16/2021    LABA1C 6.4 (H) 06/21/2021    LABMICR 183.30 (H) 08/20/2019       Orders Placed This Encounter   Procedures    Culture, Urine     Standing Status:   Future     Standing Expiration Date:   7/7/2022     Order Specific Question:   Specify (ex-cath, midstream, cysto, etc)?      Answer:   midstream    POCT Urinalysis no Micro Spent 45 minutes on appt. Patient with multiple complex, poorly controlled medical conditions and very, very frequent falls which have resulted in multiple injuries. Patient is dependent in most ADLS and most other self-care activities. She continues to fall in spite of daughter's constant presence at home and in spite of [place] [id number] services. I recommend she be admitted to SNF for rehab (per her request)  And strongly recommend permanent placement. No orders of the defined types were placed in this encounter. There are no discontinued medications. Return for please change next appt to OV in mid September. Controlled Substance Monitoring:    Acute and Chronic Pain Monitoring:   RX Monitoring 11/18/2019   Attestation -   Acute Pain Prescriptions Prescription exceeds daily limit for a specific reason. See comments or note. Periodic Controlled Substance Monitoring No signs of potential drug abuse or diversion identified.            Bertha Rivera MD

## 2021-07-07 NOTE — TELEPHONE ENCOUNTER
Called admissions, [facility], faxed office note from today's visit so patient can be considered SNF placement. Jonathan Saldivar stated patient will be contacted after her information is review.

## 2021-07-09 ENCOUNTER — CARE COORDINATION (OUTPATIENT)
Dept: CARE COORDINATION | Age: 61
End: 2021-07-09

## 2021-07-09 LAB
ORGANISM: ABNORMAL
URINE CULTURE, ROUTINE: ABNORMAL

## 2021-07-09 NOTE — CARE COORDINATION
Ambulatory Care Coordination Note  7/9/2021  CM Risk Score: 17  Charlson 10 Year Mortality Risk Score: 100%     ACC: Dom Delgado, RN    Summary Note:  I called [facility] to inquire on status of SNF placement , I spoke with Urmila's daughter- Artist Sherly and she stated they are just needing a few more pieces, they need immunization record and to get the precert from [facility] and then she should be able to get admitted. Prior to Admission medications    Medication Sig Start Date End Date Taking?  Authorizing Provider   sulfamethoxazole-trimethoprim (BACTRIM DS) 800-160 MG per tablet Take 1 tablet by mouth 2 times daily for 7 days 7/7/21 7/14/21  Kaden Cabrera MD   LANTUS SOLOSTAR 100 UNIT/ML injection pen 55 units at bedtime 6/28/21   Paige Lobo MD   insulin aspart (NOVOLOG FLEXPEN) 100 UNIT/ML injection pen INJECT 8-10  units with each meals 6/28/21   Paige Lobo MD   melatonin 10 MG CAPS capsule Take 1 capsule by mouth nightly 6/23/21   Kaden Cabrera MD   amLODIPine (NORVASC) 10 MG tablet TAKE 1 TABLET BY MOUTH DAILY 5/25/21   Leia Allen MD   aspirin EC 81 MG EC tablet Take 1 tablet by mouth daily 5/25/21   Kaden Cabrera MD   metoprolol tartrate (LOPRESSOR) 25 MG tablet Take 0.5 tablets by mouth 2 times daily 5/19/21   Kaden Cabrera MD   ARIPiprazole (ABILIFY) 5 MG tablet TAKE 1 TABLET BY MOUTH ONCE DAILY 5/19/21   Kaden Cabrera MD   pregabalin (LYRICA) 75 MG capsule TAKE ONE (1) CAPSULE BY MOUTH TWICE DAILY 5/12/21 8/9/21  Kaden Cabrera MD   magnesium oxide (MAG-OX) 400 MG tablet Take 1 tablet by mouth daily 4/28/21   Kaden Cabrera MD   atorvastatin (LIPITOR) 40 MG tablet Take 1 tablet by mouth daily 4/28/21   Kaden Cabrera MD   Insulin Pen Needle (SURE COMFORT PEN NEEDLES) 30G X 8 MM MISC USE AS DIRECTED FIVE TIMES A DAILY 4/20/21   MARCELO Cutler   fluconazole (DIFLUCAN) 150 MG tablet Take one tab let now and repeat in 48 hours if symptoms persists; do not take statin cholesterol medicine on the days you take this medicine  Patient not taking: Reported on 7/7/2021 4/15/21   Cheryle Padron MD   blood glucose test strips (FREESTYLE LITE) strip 1 each by Does not apply route 4 times daily (before meals and nightly) As needed. 3/12/21   MARCELO Bardales   Alcohol Swabs (EASY TOUCH ALCOHOL PREP MEDIUM) 70 % PADS USE AS DIRECTED THREE TIMES A DAY 3/12/21   MARCELO Bardales   FreeStyle Lancets MISC Test 4x daily 3/11/21   MARCELO Bardales   Heparin Sodium, Porcine, (HEPARIN, PORCINE,) 5000 UNIT/ML injection Inject 1 mL into the skin every 8 hours For the prevention of blood clots in convalescent setting while increasing ambulation. 2/24/21   Lena Dixon PA-C   docusate sodium (COLACE, DULCOLAX) 100 MG CAPS Take 100 mg by mouth 2 times daily For the prevention and treatment of constipation while taking pain medications. 2/24/21   Lena Dixon PA-C   acetaminophen (TYLENOL) 325 MG tablet Take 2 tablets by mouth every 4 hours as needed for Pain (For mild to moderate pain (Pain 1-6 out of 10 on pain scale)) 2/24/21   Lena Dixon PA-C   triamcinolone (KENALOG) 0.1 % cream APPLY TO AFFECTED AREAS TWICE DAILY AS DIRECTED 1/20/21   Cheryle Padron MD   [facility]) 536154 UNIT/GM powder APPLY TO ABDOMINAL FOLDS EVERY 12 HOURS AS NEEDED 1/20/21   Cheryle Padron MD   vitamin B-12 (CYANOCOBALAMIN) 100 MCG tablet Take 1 tablet by mouth daily  Patient taking differently: Take 100 mcg by mouth daily At night 1/18/21 1/18/22  Mariela Staples PA-C   [facility]  Devices (BARIATRIC ROLLATOR) MISC Rolllator with a basket 1/12/21   Cheryle Padron MD   Blood Glucose Monitoring Suppl (FREESTYLE LITE) CORETTA 1 Device by Does not apply route daily as needed (Diabetes) Use freestyle meter to test blood sugar as needed 12/29/20   MARCELO Bardales

## 2021-07-15 ENCOUNTER — OFFICE VISIT (OUTPATIENT)
Dept: GERIATRIC MEDICINE | Age: 61
End: 2021-07-15
Payer: MEDICARE

## 2021-07-15 DIAGNOSIS — N18.6 ESRD (END STAGE RENAL DISEASE) ON DIALYSIS (HCC): ICD-10-CM

## 2021-07-15 DIAGNOSIS — J42 CHRONIC BRONCHITIS, UNSPECIFIED CHRONIC BRONCHITIS TYPE (HCC): Primary | ICD-10-CM

## 2021-07-15 DIAGNOSIS — R53.1 WEAKNESS: ICD-10-CM

## 2021-07-15 DIAGNOSIS — Z99.2 ESRD (END STAGE RENAL DISEASE) ON DIALYSIS (HCC): ICD-10-CM

## 2021-07-15 DIAGNOSIS — G40.909 SEIZURE DISORDER (HCC): ICD-10-CM

## 2021-07-15 PROCEDURE — 99305 1ST NF CARE MODERATE MDM 35: CPT | Performed by: INTERNAL MEDICINE

## 2021-07-16 ENCOUNTER — OFFICE VISIT (OUTPATIENT)
Dept: GERIATRIC MEDICINE | Age: 61
End: 2021-07-16
Payer: MEDICARE

## 2021-07-16 DIAGNOSIS — R53.1 WEAKNESS: Primary | ICD-10-CM

## 2021-07-16 DIAGNOSIS — M25.512 CHRONIC PAIN OF BOTH SHOULDERS: ICD-10-CM

## 2021-07-16 DIAGNOSIS — E11.40 CONTROLLED TYPE 2 DIABETES MELLITUS WITH DIABETIC NEUROPATHY, WITH LONG-TERM CURRENT USE OF INSULIN (HCC): ICD-10-CM

## 2021-07-16 DIAGNOSIS — R26.2 DIFFICULTY IN WALKING: ICD-10-CM

## 2021-07-16 DIAGNOSIS — G89.29 CHRONIC PAIN OF BOTH SHOULDERS: ICD-10-CM

## 2021-07-16 DIAGNOSIS — Z79.4 CONTROLLED TYPE 2 DIABETES MELLITUS WITH DIABETIC NEUROPATHY, WITH LONG-TERM CURRENT USE OF INSULIN (HCC): ICD-10-CM

## 2021-07-16 DIAGNOSIS — Z99.2 ESRD (END STAGE RENAL DISEASE) ON DIALYSIS (HCC): ICD-10-CM

## 2021-07-16 DIAGNOSIS — M25.511 CHRONIC PAIN OF BOTH SHOULDERS: ICD-10-CM

## 2021-07-16 DIAGNOSIS — N18.6 ESRD (END STAGE RENAL DISEASE) ON DIALYSIS (HCC): ICD-10-CM

## 2021-07-16 DIAGNOSIS — K92.1 MELENA: ICD-10-CM

## 2021-07-16 LAB
HBA1C MFR BLD: 6.6 % (ref 4.8–5.9)
HCT VFR BLD CALC: 31.6 % (ref 37–47)
HEMOGLOBIN: 10.8 G/DL (ref 12–16)
MCH RBC QN AUTO: 32.7 PG (ref 27–31.3)
MCHC RBC AUTO-ENTMCNC: 34.2 % (ref 33–37)
MCV RBC AUTO: 95.5 FL (ref 82–100)
PDW BLD-RTO: 16.4 % (ref 11.5–14.5)
PLATELET # BLD: 242 K/UL (ref 130–400)
RBC # BLD: 3.31 M/UL (ref 4.2–5.4)
TSH SERPL DL<=0.05 MIU/L-ACNC: 0.86 UIU/ML (ref 0.44–3.86)
WBC # BLD: 7.9 K/UL (ref 4.8–10.8)

## 2021-07-16 PROCEDURE — 3044F HG A1C LEVEL LT 7.0%: CPT | Performed by: NURSE PRACTITIONER

## 2021-07-16 PROCEDURE — 99309 SBSQ NF CARE MODERATE MDM 30: CPT | Performed by: NURSE PRACTITIONER

## 2021-07-20 ENCOUNTER — OFFICE VISIT (OUTPATIENT)
Dept: GERIATRIC MEDICINE | Age: 61
End: 2021-07-20
Payer: MEDICARE

## 2021-07-20 DIAGNOSIS — E11.65 UNCONTROLLED TYPE 2 DIABETES MELLITUS WITH HYPERGLYCEMIA (HCC): ICD-10-CM

## 2021-07-20 DIAGNOSIS — J44.9 CHRONIC OBSTRUCTIVE PULMONARY DISEASE, UNSPECIFIED COPD TYPE (HCC): ICD-10-CM

## 2021-07-20 DIAGNOSIS — Z99.2 ESRD (END STAGE RENAL DISEASE) ON DIALYSIS (HCC): ICD-10-CM

## 2021-07-20 DIAGNOSIS — R53.1 WEAKNESS: Primary | ICD-10-CM

## 2021-07-20 DIAGNOSIS — N18.6 ESRD (END STAGE RENAL DISEASE) ON DIALYSIS (HCC): ICD-10-CM

## 2021-07-20 PROCEDURE — 3044F HG A1C LEVEL LT 7.0%: CPT | Performed by: NURSE PRACTITIONER

## 2021-07-20 PROCEDURE — 99308 SBSQ NF CARE LOW MDM 20: CPT | Performed by: NURSE PRACTITIONER

## 2021-07-30 ENCOUNTER — OFFICE VISIT (OUTPATIENT)
Dept: GERIATRIC MEDICINE | Age: 61
End: 2021-07-30
Payer: MEDICARE

## 2021-07-30 DIAGNOSIS — Z99.2 ESRD (END STAGE RENAL DISEASE) ON DIALYSIS (HCC): ICD-10-CM

## 2021-07-30 DIAGNOSIS — J43.1 PANLOBULAR EMPHYSEMA (HCC): Primary | ICD-10-CM

## 2021-07-30 DIAGNOSIS — R53.1 WEAKNESS: ICD-10-CM

## 2021-07-30 DIAGNOSIS — N18.6 ESRD (END STAGE RENAL DISEASE) ON DIALYSIS (HCC): ICD-10-CM

## 2021-07-30 PROCEDURE — 99309 SBSQ NF CARE MODERATE MDM 30: CPT | Performed by: INTERNAL MEDICINE

## 2021-08-01 ENCOUNTER — HOSPITAL ENCOUNTER (INPATIENT)
Age: 61
LOS: 5 days | Discharge: ANOTHER HOSPITAL | DRG: 286 | End: 2021-08-07
Attending: EMERGENCY MEDICINE | Admitting: INTERNAL MEDICINE
Payer: MEDICARE

## 2021-08-01 VITALS
OXYGEN SATURATION: 98 % | HEART RATE: 77 BPM | SYSTOLIC BLOOD PRESSURE: 124 MMHG | TEMPERATURE: 97.4 F | RESPIRATION RATE: 18 BRPM | DIASTOLIC BLOOD PRESSURE: 61 MMHG

## 2021-08-01 DIAGNOSIS — Z99.2 ESRD (END STAGE RENAL DISEASE) ON DIALYSIS (HCC): ICD-10-CM

## 2021-08-01 DIAGNOSIS — R07.9 CHEST PAIN, UNSPECIFIED TYPE: Primary | ICD-10-CM

## 2021-08-01 DIAGNOSIS — N18.6 ESRD (END STAGE RENAL DISEASE) ON DIALYSIS (HCC): ICD-10-CM

## 2021-08-01 DIAGNOSIS — R77.8 ELEVATED TROPONIN: ICD-10-CM

## 2021-08-01 LAB
BASOPHILS ABSOLUTE: 0 K/UL (ref 0–0.2)
BASOPHILS RELATIVE PERCENT: 0.4 %
EOSINOPHILS ABSOLUTE: 0.6 K/UL (ref 0–0.7)
EOSINOPHILS RELATIVE PERCENT: 6 %
HCT VFR BLD CALC: 32.3 % (ref 37–47)
HEMOGLOBIN: 11.2 G/DL (ref 12–16)
LYMPHOCYTES ABSOLUTE: 1.9 K/UL (ref 1–4.8)
LYMPHOCYTES RELATIVE PERCENT: 20.2 %
MCH RBC QN AUTO: 32.8 PG (ref 27–31.3)
MCHC RBC AUTO-ENTMCNC: 34.7 % (ref 33–37)
MCV RBC AUTO: 94.7 FL (ref 82–100)
MONOCYTES ABSOLUTE: 0.9 K/UL (ref 0.2–0.8)
MONOCYTES RELATIVE PERCENT: 9.6 %
NEUTROPHILS ABSOLUTE: 6.1 K/UL (ref 1.4–6.5)
NEUTROPHILS RELATIVE PERCENT: 63.8 %
PDW BLD-RTO: 18.7 % (ref 11.5–14.5)
PLATELET # BLD: 213 K/UL (ref 130–400)
RBC # BLD: 3.41 M/UL (ref 4.2–5.4)
WBC # BLD: 9.6 K/UL (ref 4.8–10.8)

## 2021-08-01 PROCEDURE — 36415 COLL VENOUS BLD VENIPUNCTURE: CPT

## 2021-08-01 PROCEDURE — 80053 COMPREHEN METABOLIC PANEL: CPT

## 2021-08-01 PROCEDURE — 6370000000 HC RX 637 (ALT 250 FOR IP): Performed by: EMERGENCY MEDICINE

## 2021-08-01 PROCEDURE — 85025 COMPLETE CBC W/AUTO DIFF WBC: CPT

## 2021-08-01 PROCEDURE — 99285 EMERGENCY DEPT VISIT HI MDM: CPT

## 2021-08-01 PROCEDURE — 84484 ASSAY OF TROPONIN QUANT: CPT

## 2021-08-01 RX ORDER — ACETAMINOPHEN 500 MG
1000 TABLET ORAL ONCE
Status: COMPLETED | OUTPATIENT
Start: 2021-08-01 | End: 2021-08-01

## 2021-08-01 RX ORDER — METHOCARBAMOL 500 MG/1
1000 TABLET, FILM COATED ORAL ONCE
Status: COMPLETED | OUTPATIENT
Start: 2021-08-01 | End: 2021-08-02

## 2021-08-01 RX ADMIN — ACETAMINOPHEN 1000 MG: 500 TABLET ORAL at 23:44

## 2021-08-01 ASSESSMENT — ENCOUNTER SYMPTOMS
WHEEZING: 0
COUGH: 0
ABDOMINAL DISTENTION: 0
VOMITING: 0
NAUSEA: 0
SHORTNESS OF BREATH: 0
ABDOMINAL PAIN: 0
CONSTIPATION: 0
CHEST TIGHTNESS: 0
DIARRHEA: 0
COLOR CHANGE: 0

## 2021-08-01 ASSESSMENT — PAIN DESCRIPTION - ORIENTATION: ORIENTATION: MID

## 2021-08-01 ASSESSMENT — PAIN SCALES - GENERAL
PAINLEVEL_OUTOF10: 7
PAINLEVEL_OUTOF10: 7

## 2021-08-01 ASSESSMENT — PAIN DESCRIPTION - LOCATION: LOCATION: CHEST;STERNUM

## 2021-08-01 NOTE — PROGRESS NOTES
Name: [facility] And : [facility]  [facility]  Ailyn José  Date: 7/20/2021     Subjective:     Chief Complaint   Patient presents with    Follow-up       HPI  Justine Salgado is a 59 y.o. female being seen today for evaluation of acute rehab progress, end-stage renal disease on dialysis, COPD and diabetes. Resident is participating in acute rehab, therapy states that she is ambulating 200 feet with a Rollator with contact-guard assistance. She does have loss of balance more with a Rollator versus a front wheel walker, 12 stairs with contact-guard assistance, transfers with supervision, patient has a 24-hour assist at home however had has lot of falls at home. Moderate independence and supervision for toileting task, upper body bathing min assist, upper body dressing mod assist, lower body bathing standby assist, lower body bathing min assist.  She does go to dialysis every Tuesday Thursday Saturday, has some fatigue afterwards, tolerating it well. Lung sounds are clear, on oxygen at 2 L per nasal cannula as needed. Blood sugars are within acceptable range, no signs and symptoms of hypoglycemia. Vital signs stable no fever appetite is good urine output is good bowel movement every 1 to 2 days. Resident states she has 0 out of 10 pain on a scale of 10. She is a AV fistula in the right arm positive thrill and bruit. We will continue to monitor for rehab.     Past Medical History:   Diagnosis Date    Anxiety     CAD S/P percutaneous coronary angioplasty 2015, 2018    stents per dr Renetta Stone    CHF (congestive heart failure) ([address]     CKD (chronic kidney disease) stage 4, GFR 15-29 ml/min ([address] 2/24/2018    CKD stage 4 due to type 2 diabetes mellitus ([address]     COPD (chronic obstructive pulmonary disease) ([address]     Diabetic nephropathy with proteinuria ([address] 2014    DJD (degenerative joint disease) of knee     Dr Caleb Negron GERD (gastroesophageal Skin: Negative for color change and wound. Neurological: Positive for weakness. Negative for dizziness and light-headedness. Hematological: Does not bruise/bleed easily. Psychiatric/Behavioral: Negative for behavioral problems and confusion. The patient is not nervous/anxious. Objective:   /61   Pulse 77   Temp 97.4 °F (36.3 °C)   Resp 18   LMP  (LMP Unknown)   SpO2 98%     Constitutional:  up in wheelchair, well-developed, in no acute distess  Pulmonary/Chest:  breathing unlabored, chest excursion symmetrical, no use of accessory muscles, lung sounds clear, no shortness of breath at rest, dyspnea with activity  Cardiovascular:  S1-S2 regular, no murmur, 2+ DP x 2, edema  Abd/GI:  abdomen soft, round, non-distended, non-tender, no masses, active bowel sounds x 4 quadrants  MS/Extremities:  HIGH, ROM limited, abnormal gait, no clubbing, no cyanosis  Psych:  A/O x 3, cooperative with care, no anxiety, appropriate mood and affect,   Skin:  warm and dry, color normal, no lesions, no rashes     Diagnosis Orders   1. Weakness     2. Chronic obstructive pulmonary disease, unspecified COPD type ([address]     3. ESRD (end stage renal disease) on dialysis ([address]     4. Uncontrolled type 2 diabetes mellitus with hyperglycemia (HCC)         Assessment and Plan:      1. Weakness  Making progress, continue PT OT as ordered. 2. Chronic obstructive pulmonary disease, unspecified COPD type ([address]  D oxygen as ordered. Continue medications as ordered. Respiratory therapy consult. 3. ESRD (end stage renal disease) on dialysis [facility])  Dialysis on every Tuesday Thursday Saturday. Continue medications as ordered. 4. Uncontrolled type 2 diabetes mellitus with hyperglycemia (HCC)  Continue insulin sliding scale coverage as ordered. Reviewed labs:  Yes      I have reviewed the patient's medical history in detail and updated the computerized patient record.     This note was partially generated using Dragon voice recognition system, and there may be some incorrect words, spellings, punctuation that were not noticed in checking the note before saving.     Scotty Garvey, LORETO-CNP

## 2021-08-02 ENCOUNTER — APPOINTMENT (OUTPATIENT)
Dept: GENERAL RADIOLOGY | Age: 61
DRG: 286 | End: 2021-08-02
Payer: MEDICARE

## 2021-08-02 LAB
ALBUMIN SERPL-MCNC: 4.1 G/DL (ref 3.5–4.6)
ALP BLD-CCNC: 98 U/L (ref 40–130)
ALT SERPL-CCNC: 15 U/L (ref 0–33)
ANION GAP SERPL CALCULATED.3IONS-SCNC: 16 MEQ/L (ref 9–15)
AST SERPL-CCNC: 20 U/L (ref 0–35)
BILIRUB SERPL-MCNC: 0.5 MG/DL (ref 0.2–0.7)
BUN BLDV-MCNC: 37 MG/DL (ref 8–23)
CALCIUM SERPL-MCNC: 9.7 MG/DL (ref 8.5–9.9)
CHLORIDE BLD-SCNC: 88 MEQ/L (ref 95–107)
CO2: 24 MEQ/L (ref 20–31)
CREAT SERPL-MCNC: 5.12 MG/DL (ref 0.5–0.9)
GFR AFRICAN AMERICAN: 10.3
GFR NON-AFRICAN AMERICAN: 8.5
GLOBULIN: 2.8 G/DL (ref 2.3–3.5)
GLUCOSE BLD-MCNC: 180 MG/DL (ref 60–115)
GLUCOSE BLD-MCNC: 193 MG/DL (ref 60–115)
GLUCOSE BLD-MCNC: 260 MG/DL (ref 70–99)
LV EF: 60 %
LVEF MODALITY: NORMAL
PERFORMED ON: ABNORMAL
PERFORMED ON: ABNORMAL
POTASSIUM REFLEX MAGNESIUM: 3.9 MEQ/L (ref 3.4–4.9)
SARS-COV-2, NAAT: NOT DETECTED
SODIUM BLD-SCNC: 128 MEQ/L (ref 135–144)
TOTAL PROTEIN: 6.9 G/DL (ref 6.3–8)
TROPONIN: 0.02 NG/ML (ref 0–0.01)

## 2021-08-02 PROCEDURE — 6370000000 HC RX 637 (ALT 250 FOR IP): Performed by: INTERNAL MEDICINE

## 2021-08-02 PROCEDURE — 87635 SARS-COV-2 COVID-19 AMP PRB: CPT

## 2021-08-02 PROCEDURE — 93306 TTE W/DOPPLER COMPLETE: CPT

## 2021-08-02 PROCEDURE — 5A1D70Z PERFORMANCE OF URINARY FILTRATION, INTERMITTENT, LESS THAN 6 HOURS PER DAY: ICD-10-PCS | Performed by: INTERNAL MEDICINE

## 2021-08-02 PROCEDURE — 84484 ASSAY OF TROPONIN QUANT: CPT

## 2021-08-02 PROCEDURE — 36415 COLL VENOUS BLD VENIPUNCTURE: CPT

## 2021-08-02 PROCEDURE — 6370000000 HC RX 637 (ALT 250 FOR IP): Performed by: EMERGENCY MEDICINE

## 2021-08-02 PROCEDURE — 2580000003 HC RX 258: Performed by: INTERNAL MEDICINE

## 2021-08-02 PROCEDURE — 99223 1ST HOSP IP/OBS HIGH 75: CPT | Performed by: INTERNAL MEDICINE

## 2021-08-02 PROCEDURE — 71046 X-RAY EXAM CHEST 2 VIEWS: CPT

## 2021-08-02 PROCEDURE — 2060000000 HC ICU INTERMEDIATE R&B

## 2021-08-02 RX ORDER — ATORVASTATIN CALCIUM 80 MG/1
80 TABLET, FILM COATED ORAL NIGHTLY
Status: DISCONTINUED | OUTPATIENT
Start: 2021-08-02 | End: 2021-08-02 | Stop reason: SDUPTHER

## 2021-08-02 RX ORDER — ACETAMINOPHEN 650 MG/1
650 SUPPOSITORY RECTAL EVERY 6 HOURS PRN
Status: DISCONTINUED | OUTPATIENT
Start: 2021-08-02 | End: 2021-08-07 | Stop reason: HOSPADM

## 2021-08-02 RX ORDER — ASPIRIN 81 MG/1
81 TABLET, CHEWABLE ORAL DAILY
Status: DISCONTINUED | OUTPATIENT
Start: 2021-08-03 | End: 2021-08-02

## 2021-08-02 RX ORDER — POLYETHYLENE GLYCOL 3350 17 G/17G
17 POWDER, FOR SOLUTION ORAL DAILY PRN
Status: DISCONTINUED | OUTPATIENT
Start: 2021-08-02 | End: 2021-08-07 | Stop reason: HOSPADM

## 2021-08-02 RX ORDER — ARIPIPRAZOLE 5 MG/1
5 TABLET ORAL DAILY
Status: DISCONTINUED | OUTPATIENT
Start: 2021-08-02 | End: 2021-08-07 | Stop reason: HOSPADM

## 2021-08-02 RX ORDER — ASPIRIN 81 MG/1
81 TABLET, CHEWABLE ORAL DAILY
Status: DISCONTINUED | OUTPATIENT
Start: 2021-08-02 | End: 2021-08-07 | Stop reason: HOSPADM

## 2021-08-02 RX ORDER — PANTOPRAZOLE SODIUM 20 MG/1
20 TABLET, DELAYED RELEASE ORAL DAILY
Status: DISCONTINUED | OUTPATIENT
Start: 2021-08-02 | End: 2021-08-07 | Stop reason: HOSPADM

## 2021-08-02 RX ORDER — RANOLAZINE 500 MG/1
500 TABLET, EXTENDED RELEASE ORAL 2 TIMES DAILY
Status: DISCONTINUED | OUTPATIENT
Start: 2021-08-02 | End: 2021-08-02

## 2021-08-02 RX ORDER — AMLODIPINE BESYLATE 10 MG/1
10 TABLET ORAL DAILY
Status: DISCONTINUED | OUTPATIENT
Start: 2021-08-02 | End: 2021-08-07 | Stop reason: HOSPADM

## 2021-08-02 RX ORDER — ONDANSETRON 2 MG/ML
4 INJECTION INTRAMUSCULAR; INTRAVENOUS EVERY 6 HOURS PRN
Status: DISCONTINUED | OUTPATIENT
Start: 2021-08-02 | End: 2021-08-07 | Stop reason: HOSPADM

## 2021-08-02 RX ORDER — RANOLAZINE 500 MG/1
500 TABLET, EXTENDED RELEASE ORAL 2 TIMES DAILY
Status: DISCONTINUED | OUTPATIENT
Start: 2021-08-02 | End: 2021-08-07 | Stop reason: HOSPADM

## 2021-08-02 RX ORDER — TRAMADOL HYDROCHLORIDE 50 MG/1
50 TABLET ORAL EVERY 8 HOURS PRN
Status: DISCONTINUED | OUTPATIENT
Start: 2021-08-02 | End: 2021-08-07 | Stop reason: HOSPADM

## 2021-08-02 RX ORDER — DEXTROSE MONOHYDRATE 50 MG/ML
100 INJECTION, SOLUTION INTRAVENOUS PRN
Status: DISCONTINUED | OUTPATIENT
Start: 2021-08-02 | End: 2021-08-07 | Stop reason: HOSPADM

## 2021-08-02 RX ORDER — ACETAMINOPHEN 325 MG/1
650 TABLET ORAL EVERY 4 HOURS PRN
Status: DISCONTINUED | OUTPATIENT
Start: 2021-08-02 | End: 2021-08-07 | Stop reason: HOSPADM

## 2021-08-02 RX ORDER — LANOLIN ALCOHOL/MO/W.PET/CERES
400 CREAM (GRAM) TOPICAL DAILY
Status: DISCONTINUED | OUTPATIENT
Start: 2021-08-02 | End: 2021-08-07 | Stop reason: HOSPADM

## 2021-08-02 RX ORDER — MECOBALAMIN 5000 MCG
10 TABLET,DISINTEGRATING ORAL NIGHTLY
Status: DISCONTINUED | OUTPATIENT
Start: 2021-08-02 | End: 2021-08-07 | Stop reason: HOSPADM

## 2021-08-02 RX ORDER — ATORVASTATIN CALCIUM 40 MG/1
40 TABLET, FILM COATED ORAL NIGHTLY
Status: DISCONTINUED | OUTPATIENT
Start: 2021-08-02 | End: 2021-08-07 | Stop reason: HOSPADM

## 2021-08-02 RX ORDER — HEPARIN SODIUM 1000 [USP'U]/ML
1000 INJECTION, SOLUTION INTRAVENOUS; SUBCUTANEOUS ONCE
Status: DISCONTINUED | OUTPATIENT
Start: 2021-08-02 | End: 2021-08-07 | Stop reason: HOSPADM

## 2021-08-02 RX ORDER — NITROGLYCERIN 0.4 MG/1
0.4 TABLET SUBLINGUAL EVERY 5 MIN PRN
Status: DISCONTINUED | OUTPATIENT
Start: 2021-08-02 | End: 2021-08-07 | Stop reason: HOSPADM

## 2021-08-02 RX ORDER — SODIUM CHLORIDE 0.9 % (FLUSH) 0.9 %
5-40 SYRINGE (ML) INJECTION EVERY 12 HOURS SCHEDULED
Status: DISCONTINUED | OUTPATIENT
Start: 2021-08-02 | End: 2021-08-07 | Stop reason: HOSPADM

## 2021-08-02 RX ORDER — NICOTINE POLACRILEX 4 MG
15 LOZENGE BUCCAL PRN
Status: DISCONTINUED | OUTPATIENT
Start: 2021-08-02 | End: 2021-08-07 | Stop reason: HOSPADM

## 2021-08-02 RX ORDER — SERTRALINE HYDROCHLORIDE 25 MG/1
50 TABLET, FILM COATED ORAL DAILY
Status: DISCONTINUED | OUTPATIENT
Start: 2021-08-02 | End: 2021-08-07 | Stop reason: HOSPADM

## 2021-08-02 RX ORDER — SODIUM CHLORIDE 0.9 % (FLUSH) 0.9 %
5-40 SYRINGE (ML) INJECTION PRN
Status: DISCONTINUED | OUTPATIENT
Start: 2021-08-02 | End: 2021-08-07 | Stop reason: HOSPADM

## 2021-08-02 RX ORDER — ACETAMINOPHEN 325 MG/1
650 TABLET ORAL EVERY 6 HOURS PRN
Status: DISCONTINUED | OUTPATIENT
Start: 2021-08-02 | End: 2021-08-07 | Stop reason: HOSPADM

## 2021-08-02 RX ORDER — DEXTROSE MONOHYDRATE 25 G/50ML
12.5 INJECTION, SOLUTION INTRAVENOUS PRN
Status: DISCONTINUED | OUTPATIENT
Start: 2021-08-02 | End: 2021-08-07 | Stop reason: HOSPADM

## 2021-08-02 RX ORDER — ISOSORBIDE MONONITRATE 60 MG/1
60 TABLET, EXTENDED RELEASE ORAL DAILY
Status: DISCONTINUED | OUTPATIENT
Start: 2021-08-02 | End: 2021-08-07 | Stop reason: HOSPADM

## 2021-08-02 RX ORDER — SODIUM CHLORIDE 9 MG/ML
25 INJECTION, SOLUTION INTRAVENOUS PRN
Status: DISCONTINUED | OUTPATIENT
Start: 2021-08-02 | End: 2021-08-07 | Stop reason: HOSPADM

## 2021-08-02 RX ORDER — ARIPIPRAZOLE 2 MG/1
5 TABLET ORAL DAILY
Status: DISCONTINUED | OUTPATIENT
Start: 2021-08-02 | End: 2021-08-02

## 2021-08-02 RX ORDER — TRAMADOL HYDROCHLORIDE 50 MG/1
50 TABLET ORAL EVERY 8 HOURS PRN
COMMUNITY
End: 2021-08-17

## 2021-08-02 RX ORDER — INSULIN GLARGINE 100 [IU]/ML
55 INJECTION, SOLUTION SUBCUTANEOUS NIGHTLY
Status: DISCONTINUED | OUTPATIENT
Start: 2021-08-02 | End: 2021-08-07 | Stop reason: HOSPADM

## 2021-08-02 RX ORDER — INSULIN GLARGINE 100 [IU]/ML
40 INJECTION, SOLUTION SUBCUTANEOUS NIGHTLY
Status: DISCONTINUED | OUTPATIENT
Start: 2021-08-02 | End: 2021-08-07 | Stop reason: HOSPADM

## 2021-08-02 RX ORDER — ONDANSETRON 4 MG/1
4 TABLET, ORALLY DISINTEGRATING ORAL EVERY 8 HOURS PRN
Status: DISCONTINUED | OUTPATIENT
Start: 2021-08-02 | End: 2021-08-07 | Stop reason: HOSPADM

## 2021-08-02 RX ORDER — HEPARIN SODIUM 1000 [USP'U]/ML
2000 INJECTION, SOLUTION INTRAVENOUS; SUBCUTANEOUS ONCE
Status: DISCONTINUED | OUTPATIENT
Start: 2021-08-02 | End: 2021-08-07 | Stop reason: HOSPADM

## 2021-08-02 RX ORDER — LIDOCAINE 4 G/G
1 PATCH TOPICAL DAILY
Status: DISCONTINUED | OUTPATIENT
Start: 2021-08-02 | End: 2021-08-07 | Stop reason: HOSPADM

## 2021-08-02 RX ADMIN — METOPROLOL TARTRATE 12.5 MG: 25 TABLET, FILM COATED ORAL at 22:12

## 2021-08-02 RX ADMIN — ASPIRIN 81 MG: 81 TABLET, CHEWABLE ORAL at 14:50

## 2021-08-02 RX ADMIN — TRAMADOL HYDROCHLORIDE 50 MG: 50 TABLET, FILM COATED ORAL at 14:50

## 2021-08-02 RX ADMIN — INSULIN GLARGINE 55 UNITS: 100 INJECTION, SOLUTION SUBCUTANEOUS at 22:14

## 2021-08-02 RX ADMIN — Medication 400 MG: at 22:13

## 2021-08-02 RX ADMIN — ARIPIPRAZOLE 5 MG: 5 TABLET ORAL at 14:49

## 2021-08-02 RX ADMIN — Medication 10 MG: at 22:13

## 2021-08-02 RX ADMIN — TICAGRELOR 90 MG: 90 TABLET ORAL at 14:49

## 2021-08-02 RX ADMIN — Medication 10 ML: at 23:14

## 2021-08-02 RX ADMIN — SERTRALINE HYDROCHLORIDE 50 MG: 25 TABLET ORAL at 14:49

## 2021-08-02 RX ADMIN — METHOCARBAMOL 1000 MG: 500 TABLET ORAL at 00:24

## 2021-08-02 RX ADMIN — METOPROLOL TARTRATE 12.5 MG: 25 TABLET, FILM COATED ORAL at 14:49

## 2021-08-02 RX ADMIN — AMLODIPINE BESYLATE 10 MG: 10 TABLET ORAL at 14:49

## 2021-08-02 RX ADMIN — Medication 400 MG: at 14:49

## 2021-08-02 RX ADMIN — RANOLAZINE 500 MG: 500 TABLET, FILM COATED, EXTENDED RELEASE ORAL at 14:50

## 2021-08-02 RX ADMIN — PANTOPRAZOLE SODIUM 20 MG: 20 TABLET, DELAYED RELEASE ORAL at 14:50

## 2021-08-02 RX ADMIN — ATORVASTATIN CALCIUM 40 MG: 40 TABLET, FILM COATED ORAL at 22:13

## 2021-08-02 RX ADMIN — ACETAMINOPHEN 650 MG: 325 TABLET ORAL at 08:19

## 2021-08-02 RX ADMIN — ISOSORBIDE MONONITRATE 60 MG: 60 TABLET, EXTENDED RELEASE ORAL at 14:50

## 2021-08-02 RX ADMIN — RANOLAZINE 500 MG: 500 TABLET, FILM COATED, EXTENDED RELEASE ORAL at 22:13

## 2021-08-02 RX ADMIN — TICAGRELOR 90 MG: 90 TABLET ORAL at 23:13

## 2021-08-02 RX ADMIN — TRAMADOL HYDROCHLORIDE 50 MG: 50 TABLET, FILM COATED ORAL at 23:13

## 2021-08-02 ASSESSMENT — PAIN SCALES - GENERAL
PAINLEVEL_OUTOF10: 4
PAINLEVEL_OUTOF10: 6
PAINLEVEL_OUTOF10: 7
PAINLEVEL_OUTOF10: 6

## 2021-08-02 ASSESSMENT — ENCOUNTER SYMPTOMS
SHORTNESS OF BREATH: 1
CHEST TIGHTNESS: 1
ABDOMINAL PAIN: 0
DIARRHEA: 0
EYES NEGATIVE: 1
COUGH: 0
GASTROINTESTINAL NEGATIVE: 1
ALLERGIC/IMMUNOLOGIC NEGATIVE: 1
WHEEZING: 0
NAUSEA: 0
BACK PAIN: 0
SORE THROAT: 0
VOMITING: 0

## 2021-08-02 ASSESSMENT — PAIN DESCRIPTION - DESCRIPTORS: DESCRIPTORS: DISCOMFORT

## 2021-08-02 ASSESSMENT — PAIN DESCRIPTION - ORIENTATION: ORIENTATION: MID;LEFT;ANTERIOR

## 2021-08-02 ASSESSMENT — PAIN DESCRIPTION - LOCATION: LOCATION: CHEST

## 2021-08-02 NOTE — CARE COORDINATION
Per Nora Peters, admissions at [facility], the patient would like to hold [facility] bed at [facility]. The patient can return to [facility] at discharge. Nora Fran states she talked to the patient and she wants to hold her bed under her [facility] at the facility. [facility] will continue to follow the patient while she is at the hospital. The patient does not need a precert to return to [facility].  Electronically signed by HIPOLITO Pitts on 8/2/21 at 1:10 PM EDT

## 2021-08-02 NOTE — H&P
Chief Complaint   Patient presents with    Chest Pain        Patient is a 59 y.o. female who presents with a chief complaint of chest pain. Patient is followed on a regular basis by Dr. Kamari uLu MD.  Patient with past medical history of coronary disease status post PCI, end-stage renal disease hemodialysis dependent, hypertension, hyperlipidemia, class I morbid obesity who presented with midsternal chest pressure and heaviness that felt similar to her previous symptoms prior to PCI. Patient with history of PCI of the mid LAD in-stent restenosis in May 2020. Patient has had PTCA to the mid LAD stent a few times previously. She states she is noncompliant with her medications especially her dual antiplatelet therapy. Troponin is mildly elevated in the setting of end-stage renal disease. Patient states she developed relief with sublingual nitroglycerin. She had associated shortness of breath as well as nausea and diaphoresis.     Past Medical History:   Diagnosis Date    Anxiety     CAD S/P percutaneous coronary angioplasty 2015, 2018    stents per dr Radha Luu    CHF (congestive heart failure) ([address]     CKD (chronic kidney disease) stage 4, GFR 15-29 ml/min ([address] 2/24/2018    CKD stage 4 due to type 2 diabetes mellitus ([address]     COPD (chronic obstructive pulmonary disease) ([address]     Diabetic nephropathy with proteinuria ([address] 2014    DJD (degenerative joint disease) of knee     Dr Sam Bhakta GERD (gastroesophageal reflux disease)     Hemiparesis, left ([address] 2013    [facility] Assisted [facility]    Hemodialysis patient [facility])     History of heart failure     History of seizures     History of type C viral hepatitis     HTN (hypertension)     Hyperlipidemia     Impaired mobility and activities of daily living     Mediastinal lymphadenopathy 2013    Nataliia Barbosa    Metabolic syndrome     Moderate persistent asthma without complication 5/76/8438    Need for extended care facility 7/7/2021    Neurogenic urinary incontinence 2013    Neuropathy in diabetes [facility])     Nonrheumatic mitral valve regurgitation 7/7/2021    Nonrheumatic tricuspid valve regurgitation 7/7/2021    Obesity (BMI 30-39. 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 ([address]     Type 2 diabetes mellitus with renal manifestations, controlled ([address] 2015    Insulin dependent, Dr Soto Orantes Urinary incontinence due to cognitive impairment 2013    Vitamin D deficiency 2014      Patient Active Problem List   Diagnosis    Atherosclerotic heart disease of native coronary artery with unspecified angina pectoris (HCC)    Schizophrenia, paranoid, chronic    Metabolic syndrome    Vitamin B 12 deficiency    Cerebral microvascular disease    Mixed hyperlipidemia    Other hammer toe (acquired)    Vitamin D insufficiency    Incontinence of urine    Diabetic nephropathy with proteinuria ([address]    Essential (primary) hypertension    History of type C viral hepatitis    Urinary incontinence due to cognitive impairment    History of seizures    Stented coronary artery-plan is to stay on Plavix indefinately per Dr Banuelos Ee Other specified diabetes mellitus with diabetic neuropathy, unspecified ([address]    Controlled type 2 diabetes mellitus with diabetic neuropathy, with long-term current use of insulin (HCC)    Hemiparesis, left (HCC)    Angina, class II ([address]    Pain, unspecified    Tardive dyskinesia    Shortness of breath    Uncontrolled type 2 diabetes mellitus with hyperglycemia (HCC)    Chronic diastolic congestive heart failure (HCC)    Sleep apnea, unspecified    Pulmonary hypertension, unspecified ([address]    Class 2 severe obesity with serious comorbidity and body mass index (BMI) of 36.0 to 36.9 in adult [facility])    Edema    Closed supracondylar fracture of right humerus    Other chronic pain    Palliative care patient    Chest pain    Recurrent falls    Renal failure    Difficulty in walking    ESRD (end stage renal disease) on dialysis (HCC)    Weakness    Other seizures (HCC)    Moderate persistent asthma without complication    Thrush    COVID-19    Post PTCA    Falls frequently    Contusion of right chest wall    Chest wall contusion, left, initial encounter    Headache, unspecified    Encounter for immunization    Paranoid schizophrenia ([address]    Compression fracture of spine ([address]    Closed rib fracture    Depression    Chronic obstructive pulmonary disease (HCC)    Critical illness polyneuropathy ([address]    Multiple closed fractures of ribs of right side    Nonrheumatic mitral valve regurgitation    Nonrheumatic tricuspid valve regurgitation    Need for extended care facility       Past Surgical History:   Procedure Laterality Date     SECTION      x1    COLONOSCOPY  2014    Dr. Andrea Alejo      x1 Dr. Jovi Hamilton, Dr Roman Skill CATH LAB PROCEDURE  10/02/2019    HYSTERECTOMY, TOTAL ABDOMINAL      one ovary intact, Dr Alessia Melendez, menorrhagia    NV TOTAL KNEE ARTHROPLASTY Left 2018    LEFT KNEE TOTAL KNEE ARTHROPLASTY, SHAYNA, NERVE BLOCK performed by Damián Whitney MD at [address] [place] [address] TOE AMPUTATION Right     TOTAL KNEE ARTHROPLASTY  16    Dr Qureshi Patches TUNNELED [address] Right 2020    tunneled HD catheter per Dr Phillip Antunez History     Socioeconomic History    Marital status:      Spouse name: None    Number of children: 2    Years of education: None    Highest education level: None   Occupational History    Occupation: disabled   Tobacco Use    Smoking status: Passive Smoke Exposure - Never Smoker    Smokeless tobacco: Never Used   Vaping Use    Vaping Use: Never used   Substance and Sexual Activity    Alcohol use: No     Alcohol/week: 0.0 standard drinks    Drug use: No    Sexual activity: Not Currently   Other Topics Concern    None   Social History Narrative    Born in [place], one of 5    Twin sister Reyes, very ill in 2018, [place] 2019    Moved to [facility], , 2 children, one son and one daughter    Worked at [employer], as a nurse's aide    Disabled due to mental illness    Lived at [facility], was discharged, returned to independent living in 2017 in the daughter's house and has adjusted well    One son and one daughter, live in the same house with patientShruthi pays the rent    [employer] reading (misteries)     Social Determinants of Health     Financial Resource Strain: Low Risk     Difficulty of Paying Living Expenses: Not hard at all   Food Insecurity: No Food Insecurity    Worried About Running Out of Food in the Last Year: Never true    Yung of Food in the Last Year: Never true   Transportation Needs: No Transportation Needs    Lack of Transportation (Medical): No    Lack of Transportation (Non-Medical):  No   Physical Activity:     Days of Exercise per Week:     Minutes of Exercise per Session:    Stress:     Feeling of Stress :    Social Connections:     Frequency of Communication with Friends and Family:     Frequency of Social Gatherings with Friends and Family:     Attends Orthodoxy Services:     Active Member of Clubs or Organizations:     Attends Club or Organization Meetings:     Marital Status:    Intimate Partner Violence:     Fear of Current or Ex-Partner:     Emotionally Abused:     Physically Abused:     Sexually Abused:        Family History   Problem Relation Age of Onset    Cancer Mother 76        survived   [facility] Hypertension Father     Diabetes Sister     Mental Illness Sister        Current Facility-Administered Medications   Medication Dose Route Frequency Provider Last Rate Last Admin    lidocaine 4 % external patch 1 patch  1 patch Transdermal Daily [facility] Holiday, DO   1 patch at 08/02/21 0301    sodium chloride flush 0.9 % injection 5-40 mL  5-40 mL Intravenous 2 times per day [facility] Lynda, DO        sodium chloride flush 0.9 % injection 5-40 mL  5-40 mL Intravenous PRN [facility] Holiday, DO        0.9 % sodium chloride infusion  25 mL Intravenous PRN [facility] [place], DO        ondansetron (ZOFRAN-ODT) disintegrating tablet 4 mg  4 mg Oral Q8H PRN [facility] Holiday, DO        Or    ondansetron (ZOFRAN) injection 4 mg  4 mg Intravenous Q6H PRN [facility], [place], DO        acetaminophen (TYLENOL) tablet 650 mg  650 mg Oral Q6H PRN [facility], [place], DO   650 mg at 08/02/21 1884    Or    acetaminophen (TYLENOL) suppository 650 mg  650 mg Rectal Q6H PRN [facility], [place], DO        polyethylene glycol (GLYCOLAX) packet 17 g  17 g Oral Daily PRN [facility], DO        [START ON 8/3/2021] aspirin chewable tablet 81 mg  81 mg Oral Daily [facility], [place], DO        enoxaparin (LOVENOX) injection 30 mg  30 mg Subcutaneous Daily [facility] [place], DO        nitroGLYCERIN (NITROSTAT) SL tablet 0.4 mg  0.4 mg Sublingual Q5 Min PRN [facility] [place], DO        heparin (porcine) injection 2,000 Units  2,000 Units Intravenous Once Anand Mantle, DO        heparin (porcine) injection 1,000 Units  1,000 Units Intravenous Once Anand Mantle, DO        ranolazine [facility]) extended release tablet 500 mg  500 mg Oral BID Anand Mantle, DO        sertraline (ZOLOFT) tablet 50 mg  50 mg Oral Daily Cortez Rodrigo Bescak, DO        ARIPiprazole (ABILIFY) tablet 5 mg  5 mg Oral Daily Cortez Rodrigo Bescak, DO        metoprolol tartrate (LOPRESSOR) tablet 25 mg  25 mg Oral BID Anand Mantle, DO        aspirin chewable tablet 81 mg  81 mg Oral Daily Anand Mantle, DO        insulin glargine (LANTUS) injection vial 40 Units  40 Units Subcutaneous Nightly Rodriguez Javier Bescak, DO        atorvastatin (LIPITOR) tablet 40 mg  40 mg Oral Nightly Liya Troncoso, DO        acetaminophen (TYLENOL) tablet 650 mg  650 mg Oral Q4H PRN Piute Javier Bescak, DO        glucose (GLUTOSE) 40 % oral gel 15 g  15 g Oral PRN Liya Troncoso, DO        dextrose 50 % IV solution  12.5 g Intravenous PRN Liya Troncoso, DO        glucagon (rDNA) injection 1 mg  1 mg Intramuscular PRN Liya Troncoso, DO        dextrose 5 % solution  100 mL/hr Intravenous PRN Liya Troncoso, DO           ALLERGIES: Codeine and Oxycontin [oxycodone hcl]    Review of Systems   Constitutional: Negative. Negative for chills and fever. HENT: Negative. Eyes: Negative. Respiratory: Positive for shortness of breath. Negative for wheezing. Cardiovascular: Positive for chest pain. Negative for palpitations and leg swelling. Gastrointestinal: Negative. Negative for abdominal pain, nausea and vomiting. Endocrine: Negative. Genitourinary: Negative. Musculoskeletal: Negative. Skin: Negative. Negative for rash. Allergic/Immunologic: Negative. Neurological: Negative for dizziness, weakness and headaches. Psychiatric/Behavioral: Negative. VITALS:  Blood pressure (!) 115/55, pulse 74, temperature 97.7 °F (36.5 °C), resp. rate 16, height 5' 7\" (1.702 m), weight 220 lb 1.6 oz (99.8 kg), SpO2 98 %, not currently breastfeeding. Body mass index is 34.47 kg/m². Physical Exam  Constitutional:       Appearance: She is well-developed. She is not diaphoretic. HENT:      Head: Normocephalic and atraumatic. Eyes:      Conjunctiva/sclera: Conjunctivae normal.      Pupils: Pupils are equal, round, and reactive to light. Neck:      Thyroid: No thyromegaly. Vascular: Normal carotid pulses. No carotid bruit, hepatojugular reflux or JVD. Trachea: No tracheal deviation.    Cardiovascular:      Rate and Rhythm: Normal rate and regular rhythm. Chest Wall: PMI is not displaced. Pulses: Intact distal pulses. Carotid pulses are 3+ on the right side and 3+ on the left side. Radial pulses are 3+ on the right side and 3+ on the left side. Femoral pulses are 3+ on the right side and 3+ on the left side. Popliteal pulses are 3+ on the right side and 3+ on the left side. Dorsalis pedis pulses are 3+ on the right side and 3+ on the left side. Posterior tibial pulses are 3+ on the right side and 3+ on the left side. Heart sounds: Normal heart sounds, S1 normal and S2 normal. No murmur heard. No friction rub. No gallop. No S3 or S4 sounds. Pulmonary:      Effort: Pulmonary effort is normal. No tachypnea or respiratory distress. Breath sounds: Normal breath sounds. No wheezing, rhonchi or rales. Chest:      Chest wall: No tenderness. Abdominal:      General: Bowel sounds are normal. There is no distension. Palpations: Abdomen is soft. Tenderness: There is no abdominal tenderness. There is no guarding or rebound. Musculoskeletal:         General: No tenderness. Normal range of motion. Skin:     General: Skin is warm. Findings: No erythema or rash. Nails: There is no clubbing. Neurological:      Mental Status: She is alert and oriented to person, place, and time. Cranial Nerves: No cranial nerve deficit. Coordination: Coordination normal.   Psychiatric:         Behavior: Behavior normal.         Thought Content:  Thought content normal.         Judgment: Judgment normal.         LABS:  Recent Results (from the past 24 hour(s))   CBC Auto Differential    Collection Time: 08/01/21 11:45 PM   Result Value Ref Range    WBC 9.6 4.8 - 10.8 K/uL    RBC 3.41 (L) 4.20 - 5.40 M/uL    Hemoglobin 11.2 (L) 12.0 - 16.0 g/dL    Hematocrit 32.3 (L) 37.0 - 47.0 %    MCV 94.7 82.0 - 100.0 fL    MCH 32.8 (H) 27.0 - 31.3 pg    MCHC 34.7 33.0 - 37.0 %    RDW 18.7 (H) 11.5 - 14.5 %    Platelets 427 836 - 275 K/uL    Neutrophils % 63.8 %    Lymphocytes % 20.2 %    Monocytes % 9.6 %    Eosinophils % 6.0 %    Basophils % 0.4 %    Neutrophils Absolute 6.1 1.4 - 6.5 K/uL    Lymphocytes Absolute 1.9 1.0 - 4.8 K/uL    Monocytes Absolute 0.9 (H) 0.2 - 0.8 K/uL    Eosinophils Absolute 0.6 0.0 - 0.7 K/uL    Basophils Absolute 0.0 0.0 - 0.2 K/uL   Comprehensive Metabolic Panel w/ Reflex to MG    Collection Time: 08/01/21 11:45 PM   Result Value Ref Range    Sodium 128 (L) 135 - 144 mEq/L    Potassium reflex Magnesium 3.9 3.4 - 4.9 mEq/L    Chloride 88 (L) 95 - 107 mEq/L    CO2 24 20 - 31 mEq/L    Anion Gap 16 (H) 9 - 15 mEq/L    Glucose 260 (H) 70 - 99 mg/dL    BUN 37 (H) 8 - 23 mg/dL    CREATININE 5.12 (H) 0.50 - 0.90 mg/dL    GFR Non-African American 8.5 (L) >60    GFR  10.3 (L) >60    Calcium 9.7 8.5 - 9.9 mg/dL    Total Protein 6.9 6.3 - 8.0 g/dL    Albumin 4.1 3.5 - 4.6 g/dL    Total Bilirubin 0.5 0.2 - 0.7 mg/dL    Alkaline Phosphatase 98 40 - 130 U/L    ALT 15 0 - 33 U/L    AST 20 0 - 35 U/L    Globulin 2.8 2.3 - 3.5 g/dL   Troponin    Collection Time: 08/01/21 11:45 PM   Result Value Ref Range    Troponin 0.022 (HH) 0.000 - 0.010 ng/mL   Troponin    Collection Time: 08/02/21  2:00 AM   Result Value Ref Range    Troponin 0.021 (HH) 0.000 - 0.010 ng/mL   COVID-19, Rapid    Collection Time: 08/02/21  3:25 AM    Specimen: Nasopharyngeal Swab   Result Value Ref Range    SARS-CoV-2, NAAT Not Detected Not Detected   Troponin    Collection Time: 08/02/21  4:55 AM   Result Value Ref Range    Troponin 0.021 (HH) 0.000 - 0.010 ng/mL     Troponin:   Lab Results   Component Value Date    TROPONINI 0.021 08/02/2021             ASSESSMENT:    Angina class IV  Elevated cardiac enzyme question with demand ischemia versus cardiac ischemia.   History of CAD status post PCI of the mid LAD in-stent restenosis in May 2020  End-stage renal disease hemodialysis

## 2021-08-02 NOTE — ED TRIAGE NOTES
Pt presents to ER by EMS from [facility]. EMS was called for chest pain, midsternal, 7/10, non-radiating. EKG obtained on arrival. STEMI ruled out by provider. PT received one SL Nitro PTA--  No relief.

## 2021-08-02 NOTE — PROGRESS NOTES
Assessment completed. Pt Ax0x3. NSR on tele. VSS at rest. Denies any needs at this time. C/O 4/10 left upper chest pain. Pt is resting comfortably in bed. Home medication have been req'd and are ready to be reordered. Pt ambulates with a walker and a stand by assist.    Call light in reach, bed alarm on, will note any deviations. Pt is current with Dr. Vicente Brewer and Dr. Terrie Allen for nephro.      Electronically signed by Rebekah Cedeno RN on 8/2/2021 at 6:06 AM

## 2021-08-02 NOTE — ED NOTES
Pt report was called to RN on [address] Pitts pack was placed and pt was taken to floor      Francois Queen RN  08/02/21 8522

## 2021-08-02 NOTE — PROGRESS NOTES
0820 Medicated with PO Tylenol for c/o mid-sternal chest pain. Pain is reproduceable. Dr. Loyd Gil is aware.

## 2021-08-02 NOTE — ED NOTES
Critical troponin received from lab = 0.021. MD notified with verbal readback.      Anshul Pak RN  08/02/21 8875

## 2021-08-02 NOTE — ED NOTES
Bed: 04  Expected date:   Expected time:   Means of arrival:   Comments:  ems     Alireza Del Cid RN  08/01/21 4414

## 2021-08-02 NOTE — CONSULTS
[address]                      [address], [address] [facility]                                  CONSULTATION    PATIENT NAME: Ezequiel Rodriguez                  :        1960  MED REC NO:   [id number]                            ROOM:       P144  ACCOUNT NO:   [de-identified]                           ADMIT DATE: 2021  PROVIDER:     Danny Dempsey DO    CONSULT DATE:  2021    RENAL CONSULT    HISTORY OF PRESENT ILLNESS:  A 58-year-old female admitted to the  hospital with chest discomfort. The patient had chest pain, substernal  in nature for 1 hour prior to coming to the hospital.  She was mildly  short of breath and diaphoretic. The patient is at the nursing home at  this time. She does receive hemodialysis Tuesday, , and   at [facility] in [place] through a left arm fistula. She has a  known history of hypertension, hyperlipidemia, diabetes, coronary artery  disease with mitral valve replacement, COPD, and history of  schizophrenia. The patient appears to be in no distress at this time. PAST MEDICAL HISTORY:  Mitral valve disease, end-stage renal disease,  coronary artery disease with history of CHF, hypertension,  schizophrenia, history of seizure activity. PAST SURGICAL HISTORY:  Colonoscopy, , coronary angioplasty  with stents, hysterectomy, right first toe amputation. FAMILY HISTORY:  Noncontributory. HABITS:  No smoking, no alcohol use. No opioids or nonsteroidals. ALLERGIES TO MEDICATIONS:  CODEINE and OXYCONTIN. REVIEW OF SYSTEMS:  Noncontributory. MEDICATIONS:  At the time of her admission to the hospital would include  Flovent, Nitrostat, Zoloft, Protonix, Brilinta, Ranexa, nystatin powder,  Lipitor, mag oxide, Abilify, Lopressor, Norvasc, melatonin, Lantus,  Ultram, insulin, Humalog. PHYSICAL EXAMINATION:  VITAL SIGNS:  5 feet and 7 inches, 220 pounds.   Blood pressure 110/60,  heart rate 70, respirations 16, afebrile. HEENT:  Normocephalic. Pupils equal and reactive to light. Extraocular  muscles intact. NECK:  Supple. No JVD or adenopathy. CHEST:  The lungs are clear. CARDIOVASCULAR:  Heart is regular with 1/6 systolic murmur. Midline  surgical scar present and healed. ABDOMEN:  Soft. No guarding or rigidity. Bowel sounds are present. EXTREMITIES:  Show no edema. Skin is warm and dry. Left arm with bruit  and thrill from a fistula. IMPRESSION:  1. End-stage renal disease on hemodialysis support. 2.  Chest pain, rule out possible underlying angina, ischemic disease. 3.  COPD. 4.  Schizophrenia. 5.  History of mitral valve replacement and stenting of coronary  vessels. 6.  Diabetes mellitus type 2.  7.  Hyperlipidemia. 8.  Hypertension. PLAN:  Telemetry. Check troponin levels scheduled for dialysis  tomorrow. Avoid hypotension. Cardiology to evaluate. Control blood  sugars. ADA diet.         Kelsy Mcmillan DO    D: 08/02/2021 12:08:19       T: 08/02/2021 12:13:16     [email or web address]  Job#: [id number]     Doc#: [id number]    CC:

## 2021-08-02 NOTE — DISCHARGE INSTR - COC
Continuity of Care Form    Patient Name: Lashell Marroquin   :  1960  MRN:  [id number]    Admit date:  2021  Discharge date:  2021    Code Status Order: Full Code   Advance Directives:     Admitting Physician:  Regis Dakins, DO  PCP: Berry Edwards MD    Discharging Nurse: [facility]/Room#: S563/F477-95  Discharging Unit Phone Number: [phone]    Emergency Contact:   Extended Emergency Contact Information  Primary Emergency Contact: Loan Acosta [address] Phone: [phone]  Work Phone: [phone]  Mobile Phone: [phone]  Relation: Child    Past Surgical History:  Past Surgical History:   Procedure Laterality Date     SECTION      x1    COLONOSCOPY  2014    Dr. Fran Gilmore      x1 Dr. Nuris Herrera, Dr Joseph Carlyle CATH LAB PROCEDURE  10/02/2019    HYSTERECTOMY, TOTAL ABDOMINAL      one ovary intact, Dr Tatiana Aceves, menorrhagia    [facility] Left 2018    LEFT KNEE TOTAL KNEE ARTHROPLASTY, SHAYNA, NERVE BLOCK performed by Sarah Silva MD at [address] [place] [address] Right     TOTAL KNEE ARTHROPLASTY  16    Dr Cherylene Bianchi TUN[address]ELED [address] Right 2020    tunneled HD catheter per Dr Nicky Srinivasan       Immunization History:   Immunization History   Administered Date(s) Administered    COVID-19, Maxwell Samuels, PF, 100mcg/0.5mL 2021, 2021    Influenza Vaccine, unspecified formulation 10/14/2016    Influenza Virus Vaccine 10/20/2014, 10/30/2015, 10/14/2016, 2017, 10/12/2018    Influenza Whole 10/20/2014    Influenza, Quadv, IM, (6 mo and older Fluzone, Flulaval, Fluarix and 3 yrs and older Afluria) 2017, 10/12/2018    Influenza, Quadv, IM, PF (6 mo and older Fluzone, Flulaval, Fluarix, and 3 yrs and older Afluria) 10/03/2019    Influenza, Triv, 3 Years and older, IM (Afluria (5 yrs and older) 10/14/2016    Pneumococcal Polysaccharide ([id number]) 10/15/2016    Tdap (Boostrix, Adacel) 08/03/2020       Active Problems:  Patient Active Problem List   Diagnosis Code    Atherosclerotic heart disease of native coronary artery with unspecified angina pectoris ([facility]) I25.119    Schizophrenia, paranoid, chronic Z39.8    Metabolic syndrome E83.46    Vitamin B 12 deficiency E53.8    Cerebral microvascular disease I67.89    Mixed hyperlipidemia E78.2    Other hammer toe (acquired) M20.40    Vitamin D insufficiency E55.9    Incontinence of urine R32    Diabetic nephropathy with proteinuria ([facility] E11.21    Essential (primary) hypertension I10    History of type C viral hepatitis Z86.19    Urinary incontinence due to cognitive impairment R39.81    History of seizures Z87.898    Stented coronary artery-plan is to stay on Plavix indefinately per Dr Juanita Desai Z95.5    Other specified diabetes mellitus with diabetic neuropathy, unspecified ([facility] E13.40    Controlled type 2 diabetes mellitus with diabetic neuropathy, with long-term current use of insulin ([facility]) E11.40, Z79.4    Hemiparesis, left ([facility]) G81.94    Angina, class II ([facility] I20.9    Pain, unspecified R52    Tardive dyskinesia G24.01    Shortness of breath R06.02    Uncontrolled type 2 diabetes mellitus with hyperglycemia ([facility]) E11.65    Chronic diastolic congestive heart failure ([facility]) I50.32    Sleep apnea, unspecified G47.30    Pulmonary hypertension, unspecified ([facility]) I27.20    Class 2 severe obesity with serious comorbidity and body mass index (BMI) of 36.0 to 36.9 in adult ([facility]) E66.01, Z68.36    Edema R60.9    Closed supracondylar fracture of right humerus S42.411A    Other chronic pain G89.29    Palliative care patient Z51.5    Chest pain R07.9    Recurrent falls R29.6    Renal failure N19    Difficulty in walking R26.2    ESRD (end stage renal disease) on dialysis ([facility]) N18.6, Z99.2    Weakness R53.1    Other seizures ([facility] G40.89    Moderate persistent asthma without complication U88.80    Thrush B37.0    COVID-19 U07.1    Post PTCA Z98.61    Falls frequently R29.6    Contusion of right chest wall S20.211A    Chest wall contusion, left, initial encounter S20.212A    Headache, unspecified R51.9    Encounter for immunization Z23    Paranoid schizophrenia ([facility] F20.0    Compression fracture of spine ([facility]) M48.50XA    Closed rib fracture S22.39XA    Depression F32.9    Chronic obstructive pulmonary disease (HCC) J44.9    Critical illness polyneuropathy ([facility]) G62.81    Multiple closed fractures of ribs of right side S22.41XA    Nonrheumatic mitral valve regurgitation I34.0    Nonrheumatic tricuspid valve regurgitation I36.1    Need for extended care facility Z78.9       Isolation/Infection:   Isolation          No Isolation        Patient Infection Status     Infection Onset Added Last Indicated Last Indicated By Review Planned Expiration Resolved Resolved By    None active    Resolved    COVID-19 Rule Out 21 Covid-19 Ambulatory (Ordered)   21 Brian Herrera RN    Per Dr. Torie Mohan no need for droplet plus isolation    COVID-19 21 COVID-19, Rapid   21 Brian Herrera RN    Isolation status removed per provider    C-diff Rule Out 21 Gastrointestinal Panel by DNA (Ordered)   21 Brian Herrera RN    Order discontinued    COVID-19  21 COVID-19   21     Positive 21. Electronically signed by Eduardo Hyde RN on 21 at 7:22 AM EST       COVID-19 20 COVID-19   21 Eduardo Hyde RN    COVID-19 Rule Out 20 COVID-19 (Ordered)   20 Rule-Out Test Resulted    COVID-19 Rule Out 20 COVID-19 (Ordered)   20 Rule-Out Test Resulted    COVID-19 Rule Out 20 COVID-19 (Ordered) 06/29/20 Rule-Out Test Resulted    COVID-19 Rule Out 06/28/20 06/28/20 06/28/20 COVID-19 (Ordered)   06/28/20 Rule-Out Test Resulted    COVID-19 Rule Out 06/14/20 06/14/20 06/14/20 COVID-19 (Ordered)   06/14/20 Rule-Out Test Resulted    Pt has been possitive on December          Nurse Assessment:  Last Vital Signs: BP (!) 115/55   Pulse 74   Temp 97.7 °F (36.5 °C)   Resp 16   Ht 5' 7\" (1.702 m)   Wt 220 lb 1.6 oz (99.8 kg)   LMP  (LMP Unknown)   SpO2 98%   BMI 34.47 kg/m²     Last documented pain score (0-10 scale): Pain Level: 6  Last Weight:   Wt Readings from Last 1 Encounters:   08/02/21 220 lb 1.6 oz (99.8 kg)     Mental Status:  oriented, alert, coherent, logical, thought processes intact and able to concentrate and follow conversation    IV Access:  - Peripheral IV - site  left hand, insertion date: 8/4/2021    Nursing Mobility/ADLs:  Walking   Independent  Transfer  Independent  Bathing  Assisted  Dressing  Assisted  [address]  Assisted  Med Delivery   whole    Wound Care Documentation and Therapy:        Elimination:  Continence:   · Bowel: Yes  · Bladder: Yes  Urinary Catheter: None   Colostomy/Ileostomy/Ileal Conduit: No       Date of Last BM: 8/7/2021  No intake or output data in the 24 hours ending 08/02/21 1307  No intake/output data recorded. Safety Concerns:     None    Impairments/Disabilities:      None    Nutrition Therapy:  Current Nutrition Therapy:   - Oral Diet:  Cardiac and Low Fat    Routes of Feeding: Oral  Liquids: Thin Liquids  Daily Fluid Restriction: no  Last Modified Barium Swallow with Video (Video Swallowing Test): not done    Treatments at the Time of Hospital Discharge:   Respiratory Treatments: see mar  Oxygen Therapy:  is not on home oxygen therapy.   Ventilator:    - No ventilator support    Rehab Therapies: n/a  Weight Bearing Status/Restrictions: No weight bearing restirctions  Other Medical Equipment (for information only, NOT a DME order):  walker  Other Treatments: Pt gets dialysis Tues, Thurs, and Saturdays. Right AV fistula    Patient's personal belongings (please select all that are sent with patient):  clothing    RN SIGNATURE:  Electronically signed by Christiano Helton RN on 8/7/21 at 8:00 AM EDT    CASE MANAGEMENT/SOCIAL WORK SECTION    Inpatient Status Date: ***    Readmission Risk Assessment Score:  Readmission Risk              Risk of Unplanned Readmission:  37           Discharging to Facility/ Agency   · Name: [facility]  · Address:  · Phone:[phone]  · Fax:    Dialysis Facility (if applicable)   · Name:  · Address:  · Dialysis Schedule:  · Phone:  · Fax:    / signature:Electronically signed by HIPOLITO Villalobos on 8/2/21 at 1:07 PM EDT      PHYSICIAN SECTION    Prognosis: {Prognosis:5697013732}    Condition at Discharge: Chilango Taylor Patient Condition:[id number]}    Rehab Potential (if transferring to Rehab): {Prognosis:2378989011}    Recommended Labs or Other Treatments After Discharge: ***    Physician Certification: I certify the above information and transfer of Jayna Culver  is necessary for the continuing treatment of the diagnosis listed and that she requires {Admit to Appropriate Level of Care:86242} for {GREATER/LESS:613656817} 30 days.      Update Admission H&P: {CHP DME Changes in AXZEQ:258841334}    PHYSICIAN SIGNATURE:  {Esignature:[id number]}

## 2021-08-02 NOTE — ED PROVIDER NOTES
Past Medical History:   Diagnosis Date    Anxiety     CAD S/P percutaneous coronary angioplasty , 2018    stents per dr Randell Whitaker    CHF (congestive heart failure) ([address]     CKD (chronic kidney disease) stage 4, GFR 15-29 ml/min ([address] 2018    CKD stage 4 due to type 2 diabetes mellitus ([address]     COPD (chronic obstructive pulmonary disease) ([address]     Diabetic nephropathy with proteinuria ([address]     DJD (degenerative joint disease) of knee     Dr Rowan Abarca GERD (gastroesophageal reflux disease)     Hemiparesis, left ([address] 2013    entered Assisted Living ([facility])    Hemodialysis patient [facility])     History of heart failure     History of seizures     History of type C viral hepatitis     HTN (hypertension)     Hyperlipidemia     Impaired mobility and activities of daily living     Mediastinal lymphadenopathy     Nicolás Kirkpatrick    Metabolic syndrome     Moderate persistent asthma without complication 3/92/4225    Need for extended care facility 2021    Neurogenic urinary incontinence 2013    Neuropathy in diabetes [facility])     Nonrheumatic mitral valve regurgitation 2021    Nonrheumatic tricuspid valve regurgitation 2021    Obesity (BMI 30-39. 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 Devora Bowden Zenaida Hou      x1 Dr. Sandee Ritter, Dr Cecily Cordova 2018   [address] CATH LAB PROCEDURE  10/02/2019    HYSTERECTOMY, TOTAL ABDOMINAL      one ovary intact, Dr Cheng Oneill, menorrhagia    AR TOTAL KNEE ARTHROPLASTY Left 6/21/2018    LEFT KNEE TOTAL KNEE ARTHROPLASTY, SHAYNA, NERVE BLOCK performed by Lisseth Dixon MD at [address] [place] [address] Right     TOTAL KNEE ARTHROPLASTY  05/19/16    Dr Kelly Oconnor TUNNELED [address] Right 07/01/2020    tunneled HD catheter per Dr Veronica Shepard       Current Discharge Medication List      CONTINUE these medications which have NOT CHANGED    Details   traMADol (ULTRAM) 50 MG tablet Take 50 mg by mouth every 8 hours as needed for Pain.       LANTUS SOLOSTAR 100 UNIT/ML injection pen 55 units at bedtime  Qty: 10 pen, Refills: 10      insulin aspart (NOVOLOG FLEXPEN) 100 UNIT/ML injection pen INJECT 8-10  units with each meals  Qty: 10 pen, Refills: 3      melatonin 10 MG CAPS capsule Take 1 capsule by mouth nightly  Qty: 30 capsule, Refills: 12    Associated Diagnoses: Psychophysiological insomnia      amLODIPine (NORVASC) 10 MG tablet TAKE 1 TABLET BY MOUTH DAILY  Qty: 30 tablet, Refills: 3    Associated Diagnoses: Essential (primary) hypertension      aspirin EC 81 MG EC tablet Take 1 tablet by mouth daily  Qty: 30 tablet, Refills: 12      metoprolol tartrate (LOPRESSOR) 25 MG tablet Take 0.5 tablets by mouth 2 times daily  Qty: 90 tablet, Refills: 0      ARIPiprazole (ABILIFY) 5 MG tablet TAKE 1 TABLET BY MOUTH ONCE DAILY  Qty: 30 tablet, Refills: 2      magnesium oxide (MAG-OX) 400 MG tablet Take 1 tablet by mouth daily  Qty: 90 tablet, Refills: 4      atorvastatin (LIPITOR) 40 MG tablet Take 1 tablet by mouth daily  Qty: 90 tablet, Refills: 4      docusate sodium (COLACE, DULCOLAX) 100 MG CAPS Take 100 mg by mouth 2 times daily For the prevention and treatment of constipation while taking pain medications. Qty: 30 capsule, Refills: 0      acetaminophen (TYLENOL) 325 MG tablet Take 2 tablets by mouth every 4 hours as needed for Pain (For mild to moderate pain (Pain 1-6 out of 10 on pain scale))  Qty: 120 tablet, Refills: 0      triamcinolone (KENALOG) 0.1 % cream APPLY TO AFFECTED AREAS TWICE DAILY AS DIRECTED  Qty: 80 g, Refills: 10      nystatin (NYAMYC) 062317 UNIT/GM powder APPLY TO ABDOMINAL FOLDS EVERY 12 HOURS AS NEEDED  Qty: 60 g, Refills: 10      vitamin B-12 (CYANOCOBALAMIN) 100 MCG tablet Take 1 tablet by mouth daily  Qty: 30 tablet, Refills: 10      ranolazine (RANEXA) 500 MG extended release tablet Take 1 tablet by mouth 2 times daily  Qty: 180 tablet, Refills: 2      BRILINTA 90 MG TABS tablet TAKE 1 TABLET BY MOUTH 2 TIMES DAILY  Qty: 60 tablet, Refills: 11    Associated Diagnoses: Coronary artery disease involving native coronary artery of native heart with angina pectoris (HCC)      pantoprazole (PROTONIX) 20 MG tablet TAKE 1 TABLET BY MOUTH DAILY  Qty: 90 tablet, Refills: 3      albuterol sulfate HFA (VENTOLIN HFA) 108 (90 Base) MCG/ACT inhaler Inhale 2 puffs into the lungs every 6 hours as needed for Wheezing      sertraline (ZOLOFT) 50 MG tablet TAKE 1 TABLET BY MOUTH DAILY  Qty: 90 tablet, Refills: 3    Associated Diagnoses: Schizophrenia, paranoid, chronic (HCC)      fluticasone (FLOVENT HFA) 110 MCG/ACT inhaler Inhale 2 puffs into the lungs 2 times daily  Qty: 1 Inhaler, Refills: 12    Associated Diagnoses:  Moderate persistent asthma without complication      isosorbide mononitrate (IMDUR) 60 MG extended release tablet Take 1 tablet by mouth daily  Qty: 90 tablet, Refills: 3      nitroGLYCERIN (NITROSTAT) 0.4 MG SL tablet Place 1 tablet under the tongue every 5 minutes as needed for Chest pain      Insulin Pen Needle (SURE COMFORT PEN NEEDLES) 30G X 8 MM MISC USE AS DIRECTED FIVE TIMES A DAILY  Qty: 100 each, Refills: 10      blood glucose test strips (FREESTYLE LITE) strip 1 each by Does not apply route 4 times daily (before meals and nightly) As needed. Qty: 200 strip, Refills: 5    Comments: **Patient requests 90 days supply**      Alcohol Swabs (EASY TOUCH ALCOHOL PREP MEDIUM) 70 % PADS USE AS DIRECTED THREE TIMES A DAY  Qty: 100 each, Refills: 3      FreeStyle Lancets MISC Test 4x daily  Qty: 150 each, Refills: 3      !! Misc. Devices (BARIATRIC ROLLATOR) MISC Rolllator with a basket  Qty: 1 each, Refills: 0      Blood Glucose Monitoring Suppl (FREESTYLE LITE) CORETTA 1 Device by Does not apply route daily as needed (Diabetes) Use freestyle meter to test blood sugar as needed  Qty: 1 Device, Refills: 0    Associated Diagnoses: Uncontrolled type 2 diabetes mellitus with hyperglycemia (HCC)      B Complex-C-Folic Acid (NEPHRO VITAMINS) 0.8 MG TABS Take 1 tablet by mouth daily       !! Misc. Devices [facility]) MISC To use with transport outside of the house. Qty: 1 each, Refills: 0    Associated Diagnoses: Coronary artery disease involving native heart with angina pectoris, unspecified vessel or lesion type ([address] Chronic diastolic congestive heart failure (HCC); CKD (chronic kidney disease) stage 4, GFR 15-29 ml/min ([address] Pulmonary hypertension ([address]       ! ! - Potential duplicate medications found. Please discuss with provider.           ALLERGIES     Codeine and Oxycontin [oxycodone hcl]    FAMILY HISTORY       Family History   Problem Relation Age of Onset   [place] Cancer Mother 76        survived   [place] Hypertension Father     Diabetes Sister     Mental Illness Sister           SOCIAL HISTORY       Social History     Socioeconomic History    Marital status:      Spouse name: None    Number of children: 2    Years of education: None    Highest education level: None   Occupational History    Occupation: disabled   Tobacco Use    Smoking status: Passive Smoke Exposure - Never Smoker    Smokeless tobacco: Never Used   Vaping Use    Vaping Use: Never used   Substance and Sexual Activity    Alcohol use: No     Alcohol/week: 0.0 standard drinks    Drug use: No    Sexual activity: Not Currently   Other Topics Concern    None   Social History Narrative    Born in [place], one of 5    Twin sister Reyes, very ill in 2018, [place] [address]    Moved to [facility], , 2 children, one son and one daughter    Worked at [employer], as a nurse's aide    Disabled due to mental illness    Lived at "[employer] was discharged, returned to independent living in 2017 in the daughter's house and has adjusted well    One son and one daughter, live in the same house with patient, Chirag Taylor pays the rent    Hobbies reading (misteries)     Social Determinants of Health     Financial Resource Strain: Low Risk     Difficulty of Paying Living Expenses: Not hard at all   Food Insecurity: No Food Insecurity    Worried About [email or web address] in the Last Year: Never true    [address] [place] in the Last Year: Never true   Transportation Needs: No Transportation Needs    Lack of Transportation (Medical): No    Lack of Transportation (Non-Medical): No   Physical Activity:     Days of Exercise per Week:     Minutes of Exercise per Session:    Stress:     Feeling of Stress :    Social Connections:     Frequency of Communication with Friends and Family:     Frequency of Social Gatherings with Friends and Family:     Attends Anabaptism Services:     Active Member of Clubs or Organizations:     Attends Club or Organization Meetings:     Marital Status:    Intimate Partner Violence:     Fear of Current or Ex-Partner:     Emotionally Abused:     Physically Abused:     Sexually Abused:          PHYSICAL EXAM        ED Triage Vitals [08/01/21 2305]   BP Temp Temp Source Pulse Resp SpO2 Height Weight   (!) 143/74 98.2 °F (36.8 °C) Oral 75 18 97 % -- --       Physical Exam  Vitals and nursing note reviewed. Constitutional:       General: She is not in acute distress.      Appearance: She is obese. She is not ill-appearing, toxic-appearing or diaphoretic. HENT:      Head: Normocephalic and atraumatic. Mouth/Throat:      Mouth: Mucous membranes are moist.      Pharynx: Oropharynx is clear. Eyes:      Extraocular Movements: Extraocular movements intact. Conjunctiva/sclera: Conjunctivae normal.      Pupils: Pupils are equal, round, and reactive to light. Cardiovascular:      Rate and Rhythm: Normal rate and regular rhythm. Pulses: Normal pulses. Pulmonary:      Effort: Pulmonary effort is normal. No respiratory distress. Chest:      Chest wall: Tenderness present. Abdominal:      General: There is no distension. Palpations: Abdomen is soft. Tenderness: There is no abdominal tenderness. There is no guarding or rebound. Musculoskeletal:         General: No tenderness or signs of injury. Cervical back: Normal range of motion and neck supple. No tenderness. Right lower leg: No edema. Left lower leg: No edema. Skin:     General: Skin is warm and dry. Capillary Refill: Capillary refill takes less than 2 seconds. Neurological:      General: No focal deficit present. Mental Status: She is alert and oriented to person, place, and time. Psychiatric:         Mood and Affect: Affect is flat. Behavior: Behavior is cooperative.            LABS:  Labs Reviewed   CBC WITH AUTO DIFFERENTIAL - Abnormal; Notable for the following components:       Result Value    RBC 3.41 (*)     Hemoglobin 11.2 (*)     Hematocrit 32.3 (*)     MCH 32.8 (*)     RDW 18.7 (*)     Monocytes Absolute 0.9 (*)     All other components within normal limits   COMPREHENSIVE METABOLIC PANEL W/ REFLEX TO MG FOR LOW K - Abnormal; Notable for the following components:    Sodium 128 (*)     Chloride 88 (*)     Anion Gap 16 (*)     Glucose 260 (*)     BUN 37 (*)     CREATININE 5.12 (*)     GFR Non- 8.5 (*)     GFR  10.3 (*)     All other components within normal limits    Narrative:     Roxanna Steve tel. [phone],  TROP results called to and read back by [facility] RN, 08/02/2021 00:18, by  [facility]   TROPONIN - Abnormal; Notable for the following components:    Troponin 0.022 (*)     All other components within normal limits    Narrative:     Roxanna Steve tel. [phone],  TROP results called to and read back by [facility] RN, 08/02/2021 00:18, by  [facility]   TROPONIN - Abnormal; Notable for the following components:    Troponin 0.021 (*)     All other components within normal limits    Narrative:     Roxanna Steve tel. [phone],  TROP results called to and read back by Analilia Wild RN, 08/02/2021 02:56,  by [facility]   COVID-19, RAPID   TROPONIN         MDM:   Vitals:    Vitals:    08/02/21 0400 08/02/21 0429 08/02/21 0545 08/02/21 0706   BP: 119/72 (!) 107/41  (!) 115/55   Pulse: 70 76  74   Resp:  16     Temp:  97.7 °F (36.5 °C)  97.7 °F (36.5 °C)   TempSrc:  Oral     SpO2:  98%  98%   Weight:   220 lb 1.6 oz (99.8 kg)    Height:   5' 7\" (1.702 m)        59 y.o. female per chart review has a h/o ESRD on HD, HTN, HLD, DM, CAD s/p stenting, MVR, COPD and Schizophrenia presenting to the ED via EMS c/o sternal chest pain with initial onset 1 hour PTA associated with mild SOB and diaphoresis. Upon arrival, patient awake, alert and in no acute distress, grossly neurovascularly intact. EKG as noted above. Chest x-ray without gross evidence of acute cardiopulmonary abnormalities. Given findings concern for ACS given elevated heart score with multiple risk factors, previous stenting and similar previous anginal events. Also considered musculoskeletal etiology however given CP very reproducible with movement and palpation. Initial troponin mildly elevated. Delta troponin without significant variation, but still obtained within 6 hours, unable to rule out ACS at this time.   Therefore discussed with Dr. Purnima Pollard who is amenable to accepting care of the patient for further ACS rule out. Patient was understanding and amenable to the plan of care. CRITICAL CARE TIME           PROCEDURES:  Unlessotherwise noted below, none      Procedures      FINAL IMPRESSION      1. Chest pain, unspecified type    2. Elevated troponin    3.  ESRD (end stage renal disease) on dialysis [facility])          DISPOSITION/PLAN   DISPOSITION Admitted 08/02/2021 03:33:23 AM          Blanka Hobbs MD (electronically signed)  Attending Emergency Physician     Blanka Hobbs MD  08/02/21 6539

## 2021-08-03 PROBLEM — I20.89 ANGINA AT REST: Status: ACTIVE | Noted: 2020-05-24

## 2021-08-03 PROBLEM — I20.8 ANGINA AT REST (HCC): Status: ACTIVE | Noted: 2020-05-24

## 2021-08-03 LAB
GLUCOSE BLD-MCNC: 103 MG/DL (ref 60–115)
GLUCOSE BLD-MCNC: 166 MG/DL (ref 60–115)
GLUCOSE BLD-MCNC: 182 MG/DL (ref 60–115)
GLUCOSE BLD-MCNC: 262 MG/DL (ref 60–115)
HCT VFR BLD CALC: 32.4 % (ref 37–47)
HEMOGLOBIN: 11.2 G/DL (ref 12–16)
MCH RBC QN AUTO: 32.8 PG (ref 27–31.3)
MCHC RBC AUTO-ENTMCNC: 34.7 % (ref 33–37)
MCV RBC AUTO: 94.4 FL (ref 82–100)
PDW BLD-RTO: 18 % (ref 11.5–14.5)
PERFORMED ON: ABNORMAL
PERFORMED ON: NORMAL
PLATELET # BLD: 204 K/UL (ref 130–400)
RBC # BLD: 3.43 M/UL (ref 4.2–5.4)
WBC # BLD: 7.9 K/UL (ref 4.8–10.8)

## 2021-08-03 PROCEDURE — 99233 SBSQ HOSP IP/OBS HIGH 50: CPT | Performed by: INTERNAL MEDICINE

## 2021-08-03 PROCEDURE — 2580000003 HC RX 258: Performed by: INTERNAL MEDICINE

## 2021-08-03 PROCEDURE — 6360000002 HC RX W HCPCS: Performed by: PHYSICIAN ASSISTANT

## 2021-08-03 PROCEDURE — 93005 ELECTROCARDIOGRAM TRACING: CPT | Performed by: INTERNAL MEDICINE

## 2021-08-03 PROCEDURE — 2060000000 HC ICU INTERMEDIATE R&B

## 2021-08-03 PROCEDURE — 6370000000 HC RX 637 (ALT 250 FOR IP): Performed by: INTERNAL MEDICINE

## 2021-08-03 PROCEDURE — 85027 COMPLETE CBC AUTOMATED: CPT

## 2021-08-03 PROCEDURE — APPSS30 APP SPLIT SHARED TIME 16-30 MINUTES: Performed by: PHYSICIAN ASSISTANT

## 2021-08-03 PROCEDURE — 36415 COLL VENOUS BLD VENIPUNCTURE: CPT

## 2021-08-03 PROCEDURE — 97161 PT EVAL LOW COMPLEX 20 MIN: CPT

## 2021-08-03 RX ORDER — SODIUM CHLORIDE 9 MG/ML
INJECTION, SOLUTION INTRAVENOUS
Status: DISPENSED
Start: 2021-08-03 | End: 2021-08-04

## 2021-08-03 RX ORDER — HEPARIN SODIUM 5000 [USP'U]/ML
5000 INJECTION, SOLUTION INTRAVENOUS; SUBCUTANEOUS EVERY 8 HOURS SCHEDULED
Status: DISCONTINUED | OUTPATIENT
Start: 2021-08-03 | End: 2021-08-07 | Stop reason: HOSPADM

## 2021-08-03 RX ADMIN — Medication 10 MG: at 21:19

## 2021-08-03 RX ADMIN — RANOLAZINE 500 MG: 500 TABLET, FILM COATED, EXTENDED RELEASE ORAL at 21:19

## 2021-08-03 RX ADMIN — ASPIRIN 81 MG: 81 TABLET, CHEWABLE ORAL at 10:13

## 2021-08-03 RX ADMIN — TRAMADOL HYDROCHLORIDE 50 MG: 50 TABLET, FILM COATED ORAL at 18:36

## 2021-08-03 RX ADMIN — PANTOPRAZOLE SODIUM 20 MG: 20 TABLET, DELAYED RELEASE ORAL at 10:15

## 2021-08-03 RX ADMIN — METOPROLOL TARTRATE 25 MG: 25 TABLET, FILM COATED ORAL at 21:19

## 2021-08-03 RX ADMIN — ISOSORBIDE MONONITRATE 60 MG: 60 TABLET, EXTENDED RELEASE ORAL at 21:23

## 2021-08-03 RX ADMIN — ARIPIPRAZOLE 5 MG: 5 TABLET ORAL at 12:28

## 2021-08-03 RX ADMIN — INSULIN GLARGINE 55 UNITS: 100 INJECTION, SOLUTION SUBCUTANEOUS at 21:21

## 2021-08-03 RX ADMIN — HEPARIN SODIUM 5000 UNITS: 5000 INJECTION INTRAVENOUS; SUBCUTANEOUS at 21:24

## 2021-08-03 RX ADMIN — TICAGRELOR 90 MG: 90 TABLET ORAL at 10:16

## 2021-08-03 RX ADMIN — RANOLAZINE 500 MG: 500 TABLET, FILM COATED, EXTENDED RELEASE ORAL at 10:15

## 2021-08-03 RX ADMIN — Medication 10 ML: at 21:24

## 2021-08-03 RX ADMIN — Medication 10 ML: at 10:16

## 2021-08-03 RX ADMIN — ATORVASTATIN CALCIUM 40 MG: 40 TABLET, FILM COATED ORAL at 21:19

## 2021-08-03 RX ADMIN — TICAGRELOR 90 MG: 90 TABLET ORAL at 21:19

## 2021-08-03 RX ADMIN — SERTRALINE HYDROCHLORIDE 50 MG: 25 TABLET ORAL at 10:15

## 2021-08-03 RX ADMIN — Medication 400 MG: at 10:15

## 2021-08-03 RX ADMIN — AMLODIPINE BESYLATE 10 MG: 10 TABLET ORAL at 21:23

## 2021-08-03 ASSESSMENT — ENCOUNTER SYMPTOMS
SHORTNESS OF BREATH: 0
NAUSEA: 0
CHEST TIGHTNESS: 1
VOMITING: 0
COLOR CHANGE: 0

## 2021-08-03 ASSESSMENT — PAIN SCALES - GENERAL
PAINLEVEL_OUTOF10: 0
PAINLEVEL_OUTOF10: 0
PAINLEVEL_OUTOF10: 7
PAINLEVEL_OUTOF10: 0

## 2021-08-03 NOTE — PROGRESS NOTES
Nephrology Progress Note    Assessment:  ESRDX IHD  Unstable angina hx CAD  Hypertension  CVA  DM type-2  Anemia  Access left arm     Plan: awaiting cardiac cath.  Tomorrow than dialysis thursday    Patient Active Problem List:     Atherosclerotic heart disease of native coronary artery with unspecified angina pectoris (HCC)     Schizophrenia, paranoid, chronic     Metabolic syndrome     Vitamin B 12 deficiency     Cerebral microvascular disease     Mixed hyperlipidemia     Other hammer toe (acquired)     Vitamin D insufficiency     Incontinence of urine     Diabetic nephropathy with proteinuria ([address]     Essential (primary) hypertension     History of type C viral hepatitis     Urinary incontinence due to cognitive impairment     History of seizures     Stented coronary artery-plan is to stay on Plavix indefinately per Dr Mariana Rivera     Other specified diabetes mellitus with diabetic neuropathy, unspecified ([address]     Controlled type 2 diabetes mellitus with diabetic neuropathy, with long-term current use of insulin (HCC)     Hemiparesis, left (HCC)     Angina, class II (HCC)     Pain, unspecified     Tardive dyskinesia     Shortness of breath     Uncontrolled type 2 diabetes mellitus with hyperglycemia (HCC)     Chronic diastolic congestive heart failure (HCC)     Sleep apnea, unspecified     Pulmonary hypertension, unspecified (HCC)     Class 2 severe obesity with serious comorbidity and body mass index (BMI) of 36.0 to 36.9 in [facility])     Edema     Closed supracondylar fracture of right humerus     Other chronic pain     Palliative care patient     Chest pain     Recurrent falls     Renal failure     Difficulty in walking     ESRD (end stage renal disease) on dialysis ([address]     Weakness     Other seizures (HCC)     Moderate persistent asthma without complication     Thrush     COVID-19     Post PTCA     Falls frequently     Contusion of right chest wall     Chest wall contusion, left, initial encounter Headache, unspecified     Encounter for immunization     Paranoid schizophrenia ([facility]     Compression fracture of spine ([facility]     Closed rib fracture     Depression     Chronic obstructive pulmonary disease (HCC)     Critical illness polyneuropathy ([facility]     Multiple closed fractures of ribs of right side     Nonrheumatic mitral valve regurgitation     Nonrheumatic tricuspid valve regurgitation     Need for extended care facility      Subjective:  Admit Date: 8/1/2021    Interval History:doing well    Medications:  Scheduled Meds:   lidocaine  1 patch Transdermal Daily    sodium chloride flush  5-40 mL Intravenous 2 times per day    enoxaparin  30 mg Subcutaneous Daily    heparin (porcine)  2,000 Units Intravenous Once    heparin (porcine)  1,000 Units Intravenous Once    sertraline  50 mg Oral Daily    metoprolol tartrate  25 mg Oral BID    aspirin  81 mg Oral Daily    insulin glargine  40 Units Subcutaneous Nightly    atorvastatin  40 mg Oral Nightly    amLODIPine  10 mg Oral Daily    ticagrelor  90 mg Oral BID    isosorbide mononitrate  60 mg Oral Daily    insulin glargine  55 Units Subcutaneous Nightly    magnesium oxide  400 mg Oral Daily    melatonin  10 mg Oral Nightly    metoprolol tartrate  12.5 mg Oral BID    pantoprazole  20 mg Oral Daily    ranolazine  500 mg Oral BID    ARIPiprazole  5 mg Oral Daily    insulin lispro  0-6 Units Subcutaneous TID WC    insulin lispro  0-3 Units Subcutaneous Nightly     Continuous Infusions:   sodium chloride      dextrose         CBC:   Recent Labs     08/01/21  2345 08/03/21  0653   WBC 9.6 7.9   HGB 11.2* 11.2*    204     CMP:    Recent Labs     08/01/21  2345   *   K 3.9   CL 88*   CO2 24   BUN 37*   CREATININE 5.12*   GLUCOSE 260*   CALCIUM 9.7   LABGLOM 8.5*     Troponin:   Recent Labs     08/02/21  0455   TROPONINI 0.021*     BNP: No results for input(s): BNP in the last 72 hours.   INR: No results for input(s): INR in the last 72 hours. Lipids: No results for input(s): CHOL, LDLDIRECT, TRIG, HDL, AMYLASE, LIPASE in the last 72 hours. Liver:   Recent Labs     08/01/21  2345   AST 20   ALT 15   ALKPHOS 98   PROT 6.9   LABALBU 4.1   BILITOT 0.5     Iron:  No results for input(s): IRONS, FERRITIN in the last 72 hours. Invalid input(s): LABIRONS  Urinalysis: No results for input(s): UA in the last 72 hours.     Objective:  Vitals: BP (!) 96/43   Pulse 63   Temp 97.5 °F (36.4 °C) (Oral)   Resp 16   Ht 5' 7\" (1.702 m)   Wt 218 lb 9.6 oz (99.2 kg)   LMP  (LMP Unknown)   SpO2 100%   BMI 34.24 kg/m²    Wt Readings from Last 3 Encounters:   08/03/21 218 lb 9.6 oz (99.2 kg)   07/07/21 219 lb 6.4 oz (99.5 kg)   06/28/21 219 lb 3.2 oz (99.4 kg)      24HR INTAKE/OUTPUT:  No intake or output data in the 24 hours ending 08/03/21 0908    General: alert, in no apparent distress  HEENT: normocephalic, atraumatic, anicteric  Neck: supple, no mass  Lungs: non-labored respirations, clear to auscultation bilaterally  Heart: regular rate and rhythm, no murmurs or rubs  Abdomen: soft, non-tender, non-distended  Ext: no cyanosis, no peripheral edema access left armNeuro: alert and oriented, no gross abnormalities  Psych: normal mood and affect  Skin: no rash      Electronically signed by Kvng Vera DO, MD

## 2021-08-03 NOTE — CARE COORDINATION
The patient is from [facility] and is a bed hold. Tierney Cope, admissions at [facility], remains following.  Electronically signed by HIPOLITO Ndiaye on 8/3/21 at 4:08 PM EDT

## 2021-08-03 NOTE — FLOWSHEET NOTE
0900   Am nursing  assessment completed. Pt :       Awake and resting        Alert and oriented. Breakfast: completed  RESP:      Even and non labored at rest         Lung sounds:      diminished                           Oxygen:  Complaints of: deneis  Pain: denies  IV: SL   TELE: SR  Dressings:   Precautions:              Falls:   0     Ozzy: 19  Chart and meds reviewed. Noted Labs:   Plan for today:  Call light in reach. 1335 pt to dialysis per bed. No complaints. 1822 returned from dialysis.   Electronically signed by Flavia Mcdonough RN on 8/3/2021 at 6:23 PM

## 2021-08-03 NOTE — PROGRESS NOTES
Progress Note  Patient: Nolberto Abbott  Unit/Bed: H745/R400-08  YOB: 1960  MRN: [id number]  Acct: [de-identified]   Admitting Diagnosis: Chest pain [R07.9]  Date:  8/1/2021  Hospital Day: 1    Chief Complaint:  Chest pain    Subjective        8/3/21: Seen in dialysis. Continues to complain of episodes of chest heaviness. Requesting pain meds for chest pain. Blood pressure low at 96/43 this morning. Serial troponins mildly elevated in a flat pattern in this admission. Due to ongoing chest pain complaints, history of coronary artery disease status post multiple PCI with most recent PCI of in-stent restenosis to the LAD in May 2020, plan for cardiac catheterization tomorrow to rule out progression of CAD.    8/2/21: Patient is a 59 y.o. female who presents with a chief complaint of chest pain. Patient is followed on a regular basis by Dr. Jessie Lara MD.  Patient with past medical history of coronary disease status post PCI, end-stage renal disease hemodialysis dependent, hypertension, hyperlipidemia, class I morbid obesity who presented with midsternal chest pressure and heaviness that felt similar to her previous symptoms prior to PCI. Patient with history of PCI of the mid LAD in-stent restenosis in May 2020. Patient has had PTCA to the mid LAD stent a few times previously. She states she is noncompliant with her medications especially her dual antiplatelet therapy. Troponin is mildly elevated in the setting of end-stage renal disease. Patient states she developed relief with sublingual nitroglycerin. She had associated shortness of breath as well as nausea and diaphoresis. Review of Systems:   Review of Systems   Constitutional: Negative for activity change and fever. HENT: Negative for congestion. Respiratory: Positive for chest tightness. Negative for shortness of breath. Cardiovascular: Positive for chest pain. Negative for palpitations and leg swelling. Gastrointestinal: Negative for nausea and vomiting. Musculoskeletal: Negative for arthralgias. Skin: Negative for color change, pallor and rash. Neurological: Negative for dizziness and syncope. Psychiatric/Behavioral: Negative for agitation and behavioral problems. Physical Examination:    BP (!) 96/43   Pulse 63   Temp 97.5 °F (36.4 °C) (Oral)   Resp 16   Ht 5' 7\" (1.702 m)   Wt 218 lb 9.6 oz (99.2 kg)   LMP  (LMP Unknown)   SpO2 100%   BMI 34.24 kg/m²    Physical Exam  Constitutional:       General: She is not in acute distress. Appearance: Normal appearance. HENT:      Head: Normocephalic and atraumatic. Cardiovascular:      Rate and Rhythm: Normal rate and regular rhythm. Pulmonary:      Effort: Pulmonary effort is normal. No respiratory distress. Breath sounds: No wheezing, rhonchi or rales. Abdominal:      Palpations: Abdomen is soft. Tenderness: There is no abdominal tenderness. Musculoskeletal:         General: Normal range of motion. Cervical back: Normal range of motion and neck supple. Right lower leg: No edema. Left lower leg: No edema. Skin:     General: Skin is warm and dry. Neurological:      General: No focal deficit present. Mental Status: She is alert and oriented to person, place, and time. Cranial Nerves: No cranial nerve deficit.    Psychiatric:         Mood and Affect: Mood normal.         Behavior: Behavior normal.         LABS:  CBC:   Lab Results   Component Value Date    WBC 7.9 08/03/2021    RBC 3.43 08/03/2021    HGB 11.2 08/03/2021    HCT 32.4 08/03/2021    MCV 94.4 08/03/2021    MCH 32.8 08/03/2021    MCHC 34.7 08/03/2021    RDW 18.0 08/03/2021     08/03/2021    MPV 10.0 03/05/2020     CBC with Differential:   Lab Results   Component Value Date    WBC 7.9 08/03/2021    RBC 3.43 08/03/2021    HGB 11.2 08/03/2021    HCT 32.4 08/03/2021     08/03/2021    MCV 94.4 08/03/2021    MCH 32.8 08/03/2021 MCHC 34.7 08/03/2021    RDW 18.0 08/03/2021    NRBC 0.0 07/30/2021    BANDSPCT 5 06/14/2013    LYMPHOPCT 20.2 08/01/2021    MONOPCT 9.6 08/01/2021    EOSPCT 3.5 03/05/2020    BASOPCT 0.4 08/01/2021    MONOSABS 0.9 08/01/2021    LYMPHSABS 1.9 08/01/2021    EOSABS 0.6 08/01/2021    BASOSABS 0.0 08/01/2021     CMP:    Lab Results   Component Value Date     08/01/2021    K 3.9 08/01/2021    CL 88 08/01/2021    CO2 24 08/01/2021    BUN 37 08/01/2021    CREATININE 5.12 08/01/2021    GFRAA 10.3 08/01/2021    LABGLOM 8.5 08/01/2021    GLUCOSE 260 08/01/2021    GLUCOSE 419 07/30/2021    PROT 6.9 08/01/2021    LABALBU 4.1 08/01/2021    CALCIUM 9.7 08/01/2021    BILITOT 0.5 08/01/2021    ALKPHOS 98 08/01/2021    AST 20 08/01/2021    ALT 15 08/01/2021     BMP:    Lab Results   Component Value Date     08/01/2021    K 3.9 08/01/2021    CL 88 08/01/2021    CO2 24 08/01/2021    BUN 37 08/01/2021    LABALBU 4.1 08/01/2021    CREATININE 5.12 08/01/2021    CALCIUM 9.7 08/01/2021    GFRAA 10.3 08/01/2021    LABGLOM 8.5 08/01/2021    GLUCOSE 260 08/01/2021    GLUCOSE 419 07/30/2021     Magnesium:    Lab Results   Component Value Date    MG 2.1 02/24/2021     Troponin:    Lab Results   Component Value Date    TROPONINI 0.021 08/02/2021       Radiology:  XR CHEST (2 VW)    Result Date: 8/2/2021  XR CHEST (2 VW) : 8/2/2021 CLINICAL HISTORY:  CP . COMPARISON: Portable chest 12/25/2020 and chest CT without contrast 5/14/2021. TECHNIQUE: A portable upright AP radiograph of the chest was obtained. FINDINGS: Shallow inspiratory volumes are present without significant infiltrate identified. There is no cardiomegaly, significant pleural effusion, vascular congestion, pneumothorax, or displaced fractures identified. NO EVIDENCE OF ACTIVE CARDIOPULMONARY DISEASE, BY PORTABLE CHEST RADIOGRAPHY. [facility] 5/27/20:  FINDINGS:  1.   LMT:  Left main trunk is angiographically normal.  Bifurcates into a  large left anterior descending and left circumflex coronary artery. 2.  LAD:  Left anterior descending artery has 90% mid in-stent  restenosis in the same area as previously it had been balloon dilated  twice. 3.  LCX:  Left circumflex has mild diffuse irregularity and is dominant,  large in caliber, reaches the inferior wall, inferolateral wall and  lateral wall. 4.  RCA:  Right coronary artery is small, nondominant, not injected. 5.  Left ventriculography:  Ejection fraction by echo is normal  recently, not injected due to contrast sparing. 6.  Hemodynamic:  Left ventricular end-diastolic pressure is 20 with no  gradient across the aortic valve. 7.  Dominance:  Left.     PCI NOTE:  Successful percutaneous coronary intervention of mid-LAD 95%  stenosis with BHUMIKA-3 flow reduced to 10% residual with BHUMIKA-3 flow after  implantation of Xience 2.5 x 18 drug-eluting stent, post-dilated with a  2.5 noncompliant balloon at high pressure.     CONCLUSION:  Dual antiplatelet therapy, maximal medical therapy and  increase diuresis.     Paulina Ball MD      Echocardiogram 6/30/20:  Conclusions   Summary   Normal mitral valve structure and function. 1+ MR   Normal aortic valve structure and function. Trace AR   Normal tricuspid valve structure and function. 2+ TR   RVSP 46 mmHg   Normal left ventricle structure and function. Left ventricular ejection fraction is visually estimated at 60%. Pseudonormal filling pattern noted. Signature   ----------------------------------------------------------------   Electronically signed by Lincoln Null MD(Interpreting   physician) on 06/30/2020 10:22 AM   ----------------------------------------------------------------       EKG 8/3/21: SR 69, 1st degree AVB, RBBB, LAFB, QTc 495ms    Telemetry 8/3/21: SB/SR 56-60s      Assessment:    Active Hospital Problems    Diagnosis Date Noted    Chest pain [R07.9] 05/24/2020     1. Angina  2. Elevated troponin  3.  Hx CAD s/p PCI--last PCI DEWEY of LAD ISR in May 2020  4. ESRD-HDD  5. HTN  6. Dyslipidemia  7. Obesity       Plan:  1. Maximize medical therapy-aspirin 81 mg p.o. daily, Brilinta 90 mg p.o. twice daily, Lopressor 25 mg p.o. twice daily, magnesium oxide 400 mg p.o. daily, Ranexa 500 mg p.o. twice daily, Protonix 20 mg p.o. daily, Imdur 60 mg p.o. daily, Lipitor 40 mg p.o. daily, amlodipine 10 mg p.o. daily, insulin as directed, sublingual nitroglycerin as needed for chest pain, DVT prophylaxis with Lovenox 30 mg subcu daily  2. Cardiac/diabetic diet recommended  3. Review echocardiogram  4. Monitor on telemetry for any tachycardia or bradycardia arrhythmias  5. Maintain potassium greater than 4, magnesium greater than 2  6. GI/DVT prophylaxis  7. Nephrology recommendations regarding hemodialysis management  8. Plan for cardiac catheterization with Dr. Serrano Been likely tomorrow Wednesday, 8/4/2021 given symptoms concerning for angina, elevated troponin and history of CAD status post PCI  9. Further recommendations to follow        Electronically signed by MARCELO Galeano on 8/3/2021 at 2:28 PM      Attending Supervising [de-identified] Attestation Statement  The patient is a 59 y.o. female. I have performed a history and physical examination of the patient. I discussed the case with the physician assistant. I reviewed the patient's Past Medical History, Past Surgical History, Medications, and Allergies.      Physical Exam:  Vitals:    08/03/21 1530 08/03/21 1600 08/03/21 1615 08/03/21 1630   BP: (!) 133/59 (!) 134/56 (!) 101/43 (!) 127/58   Pulse: 66 66 60 64   Resp:       Temp:       TempSrc:       SpO2:       Weight:       Height:                 Pulmonary/Chest: clear to auscultation bilaterally- no wheezes, rales or rhonchi, normal air movement, no respiratory distress  Cardiovascular: normal rate, normal S1 and S2, no gallops, intact distal pulses and no carotid bruits  Abdomen: soft, non-tender, non-distended, normal bowel sounds, no masses or organomegaly    Active Hospital Problems    Diagnosis Date Noted    Chest pain [R07.9] 05/24/2020     Priority: High        I reviewed and agree with the findings and plan documented in her note . ASSESSMENT:     Angina class IV  Elevated cardiac enzyme question with demand ischemia versus cardiac ischemia. History of CAD status post PCI of the mid LAD in-stent restenosis in May 2020  End-stage renal disease hemodialysis dependent  Essential hypertension  Hyperlipidemia  Morbid obesity          PLAN:   1. As always, aggressive risk factor modification is strongly recommended. We should adhere to the JNC VIII guidelines for HTN management and the NCEPATP III guidelines for LDL-C management. 2. Had a long talk with the patient regarding their symptoms, test results and the different treatment options available which include cardiac cath, alternate imaging modality and medical therapy. Patient would like to proceed with cardiac catheterization and understands the risks and benefits of the procedure. 3. Will await 2 d echo. 4. Max cardiac meds  5.  Monitor on tele  6. nephro for HD       Electronically signed by Lachelle Cuello DO on 8/3/21 at 4:49 PM EDT

## 2021-08-03 NOTE — PROGRESS NOTES
Physical Therapy Med Surg Initial Assessment  Facility/Department: [address] [place] [address]  Room: [place] [id number]       NAME: David Hale  : 1960 (43 y.o.)  MRN: [id number]  CODE STATUS: Full Code    Date of Service: 8/3/2021    Patient Diagnosis(es): Chest pain [R07.9]   Chief Complaint   Patient presents with    Chest Pain     Patient Active Problem List    Diagnosis Date Noted    Nonrheumatic mitral valve regurgitation 2021    Nonrheumatic tricuspid valve regurgitation 2021    Need for extended care facility 2021    Multiple closed fractures of ribs of right side 2021    Depression 2021    Chronic obstructive pulmonary disease ([facility] 2021    Critical illness polyneuropathy ([facility] 2021    Compression fracture of spine ([facility] 2021    Closed rib fracture 2021    Chest wall contusion, left, initial encounter 2021    Contusion of right chest wall 2021    Falls frequently 2021    Post PTCA     Headache, unspecified 2021    COVID-19 2020    Thrush 2020    Moderate persistent asthma without complication     Weakness 2020    Difficulty in walking 2020    Atherosclerotic heart disease of native coronary artery with unspecified angina pectoris ([address] 2020    Essential (primary) hypertension 2020    Pain, unspecified 2020    ESRD (end stage renal disease) on dialysis ([address] 2020    Other seizures ([facility] 2020    Encounter for immunization 2020    Paranoid schizophrenia ([facility] 2020    Renal failure 2020    Recurrent falls 2020    Chest pain 2020    Edema 2019    Closed supracondylar fracture of right humerus 2019    Other chronic pain 2019    Palliative care patient 2019    Class 2 severe obesity with serious comorbidity and body mass index (BMI) of 36.0 to 36.9 in [facility]) 2019    Sleep apnea, unspecified 11/05/2019    Pulmonary hypertension, unspecified ([address] 11/05/2019    Chronic diastolic congestive heart failure ([address] 10/04/2019    Uncontrolled type 2 diabetes mellitus with hyperglycemia (HCC)     Shortness of breath 10/02/2019    Tardive dyskinesia 05/16/2019    Angina, class II ([address]     Controlled type 2 diabetes mellitus with diabetic neuropathy, with long-term current use of insulin ([address] 02/24/2017    Other specified diabetes mellitus with diabetic neuropathy, unspecified ([address] 08/04/2016    Stented coronary artery-plan is to stay on Plavix indefinately per Dr Supriya Xiong 06/02/2016    History of seizures     Urinary incontinence due to cognitive impairment     History of type C viral hepatitis     Diabetic nephropathy with proteinuria ([address]     Incontinence of urine 04/07/2014    Vitamin D insufficiency 02/04/2014    Vitamin B 12 deficiency 06/05/2013    Cerebral microvascular disease 06/05/2013    Hemiparesis, left ([address] 01/01/2013    Schizophrenia, paranoid, chronic     Metabolic syndrome     Mixed hyperlipidemia 12/09/2010    Other hammer toe (acquired) 12/13/2007        Past Medical History:   Diagnosis Date    Anxiety     CAD S/P percutaneous coronary angioplasty 2015, 2018    stents per dr Radha Luu    CHF (congestive heart failure) ([address]     CKD (chronic kidney disease) stage 4, GFR 15-29 ml/min ([address] 2/24/2018    CKD stage 4 due to type 2 diabetes mellitus ([address]     COPD (chronic obstructive pulmonary disease) ([address]     Diabetic nephropathy with proteinuria ([address] 2014    DJD (degenerative joint disease) of knee     Dr Sam Bhakta GERD (gastroesophageal reflux disease)     Hemiparesis, left ([address] 2013    entered Assisted Living ([facility])    Hemodialysis patient [facility])     History of heart failure     History of seizures     History of type C viral hepatitis     HTN (hypertension)     Hyperlipidemia     Impaired mobility and activities of daily living     Mediastinal lymphadenopathy     Jean Cuevas    Metabolic syndrome     Moderate persistent asthma without complication 3/00/3422    Need for extended care facility 2021    Neurogenic urinary incontinence     Neuropathy in diabetes [facility])     Nonrheumatic mitral valve regurgitation 2021    Nonrheumatic tricuspid valve regurgitation 2021    Obesity (BMI 30-39. 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     Type 2 diabetes mellitus with renal manifestations, controlled ([address]     Insulin dependent, Dr Rukhsana Lozano Urinary incontinence due to cognitive impairment     Vitamin D deficiency      Past Surgical History:   Procedure Laterality Date     SECTION      x1    COLONOSCOPY  2014    Dr. Arabella Prado      x1 Dr. Elliott Lepe, Dr Dimitri Del aVlle CATH LAB PROCEDURE  10/02/2019    HYSTERECTOMY, TOTAL ABDOMINAL      one ovary intact, Dr Lorie Cabrera, menorrhagia    NE TOTAL KNEE ARTHROPLASTY Left 2018    LEFT KNEE TOTAL KNEE ARTHROPLASTY, SHAYNA, NERVE BLOCK performed by Margaux Christiansen MD at [address] [place] [address] Right     TOTAL KNEE ARTHROPLASTY  16    Dr Cheney Child TUNNELED [address] Right 2020    tunneled HD catheter per Dr Monet Cardenas       Chart Reviewed: Yes  Family / Caregiver Present: No    Restrictions:  Restrictions/Precautions: Up Ad Rosie     SUBJECTIVE:      Pain  Pre Treatment Pain Screening  Pain at present: 0    Post Treatment Pain Screening:   Pain Assessment  Pain Level: 0    Prior Level of Function:  Social/Functional

## 2021-08-03 NOTE — FLOWSHEET NOTE
08/03/21 1800   Vital Signs   BP (!) 129/57   Temp 97 °F (36.1 °C)   Resp 16   Weight 212 lb 8.4 oz (96.4 kg)   Percent Weight Change -2.82   Pain Assessment   Pain Level 0   Post-Hemodialysis Assessment   Post-Treatment Procedures Blood returned; Access bleeding time < 10 minutes   Machine Disinfection Process Acid/Vinegar Clean;Heat Disinfect   Rinseback Volume (ml) 200 ml   Total Liters Processed (l/min) 88 l/min   Dialyzer Clearance Moderately streaked   Duration of Treatment (minutes) 240 minutes   Hemodialysis Intake (ml) 400 ml   Hemodialysis Output (ml) 3400 ml   NET Removed (ml) 3000 ml   Tolerated Treatment Good   Patient Response to Treatment Well tolerated   Bilateral Breath Sounds Clear

## 2021-08-04 ENCOUNTER — TELEPHONE (OUTPATIENT)
Dept: CARDIOLOGY CLINIC | Age: 61
End: 2021-08-04

## 2021-08-04 LAB
ANION GAP SERPL CALCULATED.3IONS-SCNC: 17 MEQ/L (ref 9–15)
BUN BLDV-MCNC: 32 MG/DL (ref 8–23)
CALCIUM SERPL-MCNC: 9.7 MG/DL (ref 8.5–9.9)
CHLORIDE BLD-SCNC: 94 MEQ/L (ref 95–107)
CO2: 26 MEQ/L (ref 20–31)
CREAT SERPL-MCNC: 4.63 MG/DL (ref 0.5–0.9)
EKG ATRIAL RATE: 69 BPM
EKG ATRIAL RATE: 75 BPM
EKG P AXIS: 40 DEGREES
EKG P AXIS: 53 DEGREES
EKG P-R INTERVAL: 274 MS
EKG P-R INTERVAL: 280 MS
EKG Q-T INTERVAL: 450 MS
EKG Q-T INTERVAL: 462 MS
EKG QRS DURATION: 140 MS
EKG QRS DURATION: 142 MS
EKG QTC CALCULATION (BAZETT): 495 MS
EKG QTC CALCULATION (BAZETT): 502 MS
EKG R AXIS: -57 DEGREES
EKG R AXIS: -57 DEGREES
EKG T AXIS: 54 DEGREES
EKG T AXIS: 61 DEGREES
EKG VENTRICULAR RATE: 69 BPM
EKG VENTRICULAR RATE: 75 BPM
GFR AFRICAN AMERICAN: 11.5
GFR NON-AFRICAN AMERICAN: 9.5
GLUCOSE BLD-MCNC: 130 MG/DL (ref 70–99)
GLUCOSE BLD-MCNC: 144 MG/DL (ref 60–115)
GLUCOSE BLD-MCNC: 186 MG/DL (ref 60–115)
GLUCOSE BLD-MCNC: 186 MG/DL (ref 60–115)
LV EF: 55 %
LVEF MODALITY: NORMAL
PERFORMED ON: ABNORMAL
POTASSIUM SERPL-SCNC: 4.3 MEQ/L (ref 3.4–4.9)
SODIUM BLD-SCNC: 137 MEQ/L (ref 135–144)

## 2021-08-04 PROCEDURE — C1760 CLOSURE DEV, VASC: HCPCS

## 2021-08-04 PROCEDURE — 2580000003 HC RX 258

## 2021-08-04 PROCEDURE — 6360000002 HC RX W HCPCS

## 2021-08-04 PROCEDURE — 80048 BASIC METABOLIC PNL TOTAL CA: CPT

## 2021-08-04 PROCEDURE — 93458 L HRT ARTERY/VENTRICLE ANGIO: CPT | Performed by: INTERNAL MEDICINE

## 2021-08-04 PROCEDURE — 4A023N7 MEASUREMENT OF CARDIAC SAMPLING AND PRESSURE, LEFT HEART, PERCUTANEOUS APPROACH: ICD-10-PCS | Performed by: INTERNAL MEDICINE

## 2021-08-04 PROCEDURE — 2580000003 HC RX 258: Performed by: PHYSICIAN ASSISTANT

## 2021-08-04 PROCEDURE — 6360000004 HC RX CONTRAST MEDICATION: Performed by: INTERNAL MEDICINE

## 2021-08-04 PROCEDURE — 2580000003 HC RX 258: Performed by: INTERNAL MEDICINE

## 2021-08-04 PROCEDURE — B2111ZZ FLUOROSCOPY OF MULTIPLE CORONARY ARTERIES USING LOW OSMOLAR CONTRAST: ICD-10-PCS | Performed by: INTERNAL MEDICINE

## 2021-08-04 PROCEDURE — 6370000000 HC RX 637 (ALT 250 FOR IP): Performed by: INTERNAL MEDICINE

## 2021-08-04 PROCEDURE — 6360000002 HC RX W HCPCS: Performed by: PHYSICIAN ASSISTANT

## 2021-08-04 PROCEDURE — 2709999900 HC NON-CHARGEABLE SUPPLY

## 2021-08-04 PROCEDURE — C1894 INTRO/SHEATH, NON-LASER: HCPCS

## 2021-08-04 PROCEDURE — C1769 GUIDE WIRE: HCPCS

## 2021-08-04 PROCEDURE — 93010 ELECTROCARDIOGRAM REPORT: CPT | Performed by: INTERNAL MEDICINE

## 2021-08-04 PROCEDURE — B2151ZZ FLUOROSCOPY OF LEFT HEART USING LOW OSMOLAR CONTRAST: ICD-10-PCS | Performed by: INTERNAL MEDICINE

## 2021-08-04 PROCEDURE — 36415 COLL VENOUS BLD VENIPUNCTURE: CPT

## 2021-08-04 PROCEDURE — 2500000003 HC RX 250 WO HCPCS

## 2021-08-04 PROCEDURE — 2060000000 HC ICU INTERMEDIATE R&B

## 2021-08-04 RX ORDER — SODIUM CHLORIDE 9 MG/ML
INJECTION, SOLUTION INTRAVENOUS CONTINUOUS
Status: DISCONTINUED | OUTPATIENT
Start: 2021-08-04 | End: 2021-08-07 | Stop reason: HOSPADM

## 2021-08-04 RX ORDER — SODIUM CHLORIDE 9 MG/ML
25 INJECTION, SOLUTION INTRAVENOUS PRN
Status: DISCONTINUED | OUTPATIENT
Start: 2021-08-04 | End: 2021-08-07 | Stop reason: HOSPADM

## 2021-08-04 RX ORDER — SODIUM CHLORIDE 0.9 % (FLUSH) 0.9 %
5-40 SYRINGE (ML) INJECTION EVERY 12 HOURS SCHEDULED
Status: DISCONTINUED | OUTPATIENT
Start: 2021-08-04 | End: 2021-08-07 | Stop reason: HOSPADM

## 2021-08-04 RX ORDER — SODIUM CHLORIDE 0.9 % (FLUSH) 0.9 %
5-40 SYRINGE (ML) INJECTION PRN
Status: DISCONTINUED | OUTPATIENT
Start: 2021-08-04 | End: 2021-08-07 | Stop reason: HOSPADM

## 2021-08-04 RX ADMIN — RANOLAZINE 500 MG: 500 TABLET, FILM COATED, EXTENDED RELEASE ORAL at 20:09

## 2021-08-04 RX ADMIN — IOPAMIDOL 65 ML: 612 INJECTION, SOLUTION INTRAVENOUS at 11:31

## 2021-08-04 RX ADMIN — ATORVASTATIN CALCIUM 40 MG: 40 TABLET, FILM COATED ORAL at 20:08

## 2021-08-04 RX ADMIN — Medication 10 MG: at 20:08

## 2021-08-04 RX ADMIN — TRAMADOL HYDROCHLORIDE 50 MG: 50 TABLET, FILM COATED ORAL at 23:12

## 2021-08-04 RX ADMIN — INSULIN GLARGINE 55 UNITS: 100 INJECTION, SOLUTION SUBCUTANEOUS at 20:10

## 2021-08-04 RX ADMIN — TICAGRELOR 90 MG: 90 TABLET ORAL at 08:08

## 2021-08-04 RX ADMIN — Medication 10 ML: at 20:11

## 2021-08-04 RX ADMIN — Medication 400 MG: at 08:08

## 2021-08-04 RX ADMIN — METOPROLOL TARTRATE 25 MG: 25 TABLET, FILM COATED ORAL at 20:09

## 2021-08-04 RX ADMIN — SODIUM CHLORIDE: 9 INJECTION, SOLUTION INTRAVENOUS at 08:10

## 2021-08-04 RX ADMIN — TICAGRELOR 90 MG: 90 TABLET ORAL at 20:09

## 2021-08-04 RX ADMIN — ARIPIPRAZOLE 5 MG: 5 TABLET ORAL at 08:08

## 2021-08-04 RX ADMIN — TRAMADOL HYDROCHLORIDE 50 MG: 50 TABLET, FILM COATED ORAL at 14:54

## 2021-08-04 RX ADMIN — AMLODIPINE BESYLATE 10 MG: 10 TABLET ORAL at 08:07

## 2021-08-04 RX ADMIN — METOPROLOL TARTRATE 25 MG: 25 TABLET, FILM COATED ORAL at 08:07

## 2021-08-04 RX ADMIN — SERTRALINE HYDROCHLORIDE 50 MG: 25 TABLET ORAL at 08:08

## 2021-08-04 RX ADMIN — ISOSORBIDE MONONITRATE 60 MG: 60 TABLET, EXTENDED RELEASE ORAL at 08:08

## 2021-08-04 RX ADMIN — ASPIRIN 81 MG: 81 TABLET, CHEWABLE ORAL at 08:08

## 2021-08-04 RX ADMIN — Medication 10 ML: at 08:08

## 2021-08-04 RX ADMIN — RANOLAZINE 500 MG: 500 TABLET, FILM COATED, EXTENDED RELEASE ORAL at 08:08

## 2021-08-04 RX ADMIN — TRAMADOL HYDROCHLORIDE 50 MG: 50 TABLET, FILM COATED ORAL at 02:59

## 2021-08-04 RX ADMIN — HEPARIN SODIUM 5000 UNITS: 5000 INJECTION INTRAVENOUS; SUBCUTANEOUS at 20:09

## 2021-08-04 ASSESSMENT — PAIN SCALES - GENERAL
PAINLEVEL_OUTOF10: 0
PAINLEVEL_OUTOF10: 7
PAINLEVEL_OUTOF10: 6
PAINLEVEL_OUTOF10: 0
PAINLEVEL_OUTOF10: 0

## 2021-08-04 ASSESSMENT — PAIN DESCRIPTION - FREQUENCY: FREQUENCY: CONTINUOUS

## 2021-08-04 ASSESSMENT — PAIN DESCRIPTION - ONSET: ONSET: ON-GOING

## 2021-08-04 ASSESSMENT — PAIN DESCRIPTION - ORIENTATION: ORIENTATION: MID;LEFT;ANTERIOR

## 2021-08-04 ASSESSMENT — PAIN DESCRIPTION - LOCATION: LOCATION: CHEST

## 2021-08-04 ASSESSMENT — PAIN DESCRIPTION - DESCRIPTORS: DESCRIPTORS: DISCOMFORT;HEAVINESS

## 2021-08-04 ASSESSMENT — PAIN DESCRIPTION - PROGRESSION: CLINICAL_PROGRESSION: GRADUALLY WORSENING

## 2021-08-04 ASSESSMENT — PAIN DESCRIPTION - PAIN TYPE: TYPE: ACUTE PAIN

## 2021-08-04 NOTE — TELEPHONE ENCOUNTER
Patient daughter called to request  to direct admit her mother to [facility] since he is referring patient to [facility]. Angelina states her mother is still having chest pain and afraid to go home. Explained to patient's daughter - Patient is still in the care of the Hospital and she can ask the nurse to schedule with the  to see what options are available for direct admit.  I also perfect served Dr. Susan Frazier

## 2021-08-04 NOTE — PROGRESS NOTES
Nephrology Progress Note    Assessment:  Angina  ESRDX  Left arm fistula  Bipolar  Hypertension  Hx hepatitis-c      Plan: Catherization today dialysis tomorrow    Patient Active Problem List:     Atherosclerotic heart disease of native coronary artery with unspecified angina pectoris (HCC)     Schizophrenia, paranoid, chronic     Metabolic syndrome     Vitamin B 12 deficiency     Cerebral microvascular disease     Mixed hyperlipidemia     Other hammer toe (acquired)     Vitamin D insufficiency     Incontinence of urine     Diabetic nephropathy with proteinuria ([address]     Essential (primary) hypertension     History of type C viral hepatitis     Urinary incontinence due to cognitive impairment     History of seizures     Stented coronary artery-plan is to stay on Plavix indefinately per Dr Atiya Garcia     Other specified diabetes mellitus with diabetic neuropathy, unspecified ([address]     Controlled type 2 diabetes mellitus with diabetic neuropathy, with long-term current use of insulin (HCC)     Hemiparesis, left (HCC)     Angina, class II ([address]     Pain, unspecified     Tardive dyskinesia     Shortness of breath     Uncontrolled type 2 diabetes mellitus with hyperglycemia (HCC)     Chronic diastolic congestive heart failure (HCC)     Sleep apnea, unspecified     Pulmonary hypertension, unspecified (HCC)     Class 2 severe obesity with serious comorbidity and body mass index (BMI) of 36.0 to 36.9 in adult [facility])     Edema     Closed supracondylar fracture of right humerus     Other chronic pain     Palliative care patient     Angina at rest [facility])     Recurrent falls     Renal failure     Difficulty in walking     ESRD (end stage renal disease) on dialysis ([address]     Weakness     Other seizures (HCC)     Moderate persistent asthma without complication     Thrush     COVID-19     Post PTCA     Falls frequently     Contusion of right chest wall     Chest wall contusion, left, initial encounter     Headache, unspecified Encounter for immunization     Paranoid schizophrenia ([facility]     Compression fracture of spine ([facility]     Closed rib fracture     Depression     Chronic obstructive pulmonary disease (HCC)     Critical illness polyneuropathy ([facility]     Multiple closed fractures of ribs of right side     Nonrheumatic mitral valve regurgitation     Nonrheumatic tricuspid valve regurgitation     Need for extended care facility      Subjective:  Admit Date: 8/1/2021    Interval History: going for dialysis     Medications:  Scheduled Meds:   sodium chloride flush  5-40 mL Intravenous 2 times per day    heparin (porcine)  5,000 Units Subcutaneous 3 times per day    lidocaine  1 patch Transdermal Daily    sodium chloride flush  5-40 mL Intravenous 2 times per day    heparin (porcine)  2,000 Units Intravenous Once    heparin (porcine)  1,000 Units Intravenous Once    sertraline  50 mg Oral Daily    metoprolol tartrate  25 mg Oral BID    aspirin  81 mg Oral Daily    insulin glargine  40 Units Subcutaneous Nightly    atorvastatin  40 mg Oral Nightly    amLODIPine  10 mg Oral Daily    ticagrelor  90 mg Oral BID    isosorbide mononitrate  60 mg Oral Daily    insulin glargine  55 Units Subcutaneous Nightly    magnesium oxide  400 mg Oral Daily    melatonin  10 mg Oral Nightly    pantoprazole  20 mg Oral Daily    ranolazine  500 mg Oral BID    ARIPiprazole  5 mg Oral Daily    insulin lispro  0-6 Units Subcutaneous TID WC    insulin lispro  0-3 Units Subcutaneous Nightly     Continuous Infusions:   sodium chloride 75 mL/hr at 08/04/21 0810    sodium chloride      sodium chloride      dextrose         CBC:   Recent Labs     08/01/21  2345 08/03/21  0653   WBC 9.6 7.9   HGB 11.2* 11.2*    204     CMP:    Recent Labs     08/01/21  2345 08/04/21  0715   * 137   K 3.9 4.3   CL 88* 94*   CO2 24 26   BUN 37* 32*   CREATININE 5.12* 4.63*   GLUCOSE 260* 130*   CALCIUM 9.7 9.7   LABGLOM 8.5* 9.5*     Troponin:   Recent Labs     08/02/21  0455   TROPONINI 0.021*     BNP: No results for input(s): BNP in the last 72 hours. INR: No results for input(s): INR in the last 72 hours. Lipids: No results for input(s): CHOL, LDLDIRECT, TRIG, HDL, AMYLASE, LIPASE in the last 72 hours. Liver:   Recent Labs     08/01/21  2345   AST 20   ALT 15   ALKPHOS 98   PROT 6.9   LABALBU 4.1   BILITOT 0.5     Iron:  No results for input(s): IRONS, FERRITIN in the last 72 hours. Invalid input(s): LABIRONS  Urinalysis: No results for input(s): UA in the last 72 hours.     Objective:  Vitals: BP (!) 124/59   Pulse 59   Temp 98 °F (36.7 °C) (Temporal)   Resp 18   Ht 5' 7\" (1.702 m)   Wt 212 lb 8.4 oz (96.4 kg)   LMP  (LMP Unknown)   SpO2 99%   BMI 33.29 kg/m²    Wt Readings from Last 3 Encounters:   08/03/21 212 lb 8.4 oz (96.4 kg)   07/07/21 219 lb 6.4 oz (99.5 kg)   06/28/21 219 lb 3.2 oz (99.4 kg)      24HR INTAKE/OUTPUT:      Intake/Output Summary (Last 24 hours) at 8/4/2021 0934  Last data filed at 8/4/2021 0731  Gross per 24 hour   Intake 1010 ml   Output 3400 ml   Net -2390 ml       General: alert, in no apparent distress  HEENT: normocephalic, atraumatic, anicteric  Neck: supple, no mass  Lungs: non-labored respirations, clear to auscultation bilaterally  Heart: regular rate and rhythm, no murmurs or rubs  Abdomen: soft, non-tender, non-distended  Ext: no cyanosis, no peripheral edema access left arm  Neuro: alert and oriented, no gross abnormalities  Psych: normal mood and affect  Skin: no rash      Electronically signed by Ferd Bosworth, DO, MD

## 2021-08-04 NOTE — PROGRESS NOTES
0820 Morning meds given with sip of water per order. IVF initiated and NPO status maintained. Patient did not sign consent for procedure because she says Dr. Al Novak did not explain everything to her including risks/ benefits. Verbal education provided. Patient denies needs. 0900 Pt. To cath lab via wheelchair. 1300 Returned from cath in stable condition. Eating lunch. HOB maintained at 30 degrees. Right groin stable, no bleeding. Patient denies needs.

## 2021-08-04 NOTE — PROGRESS NOTES
Arrived pre/post cath from cath lab. Pt alert and oriented. Denies pain. Vascade to right groin. No bleeding or hematoma.

## 2021-08-04 NOTE — PROGRESS NOTES
Arrived pre/post cath via wheelchair from [facility] alert and oriented. Pt requesting to speak with Dr Jack Man again before she will sign consent. [id number] Dr Jack Man at bedside.

## 2021-08-04 NOTE — BRIEF OP NOTE
Section of Cardiology  Adult Brief Cardiac Cath Procedure Note        Procedure(s):  LHC, b/l coronary angio    Pre-operative Diagnosis:  angina    H&P Status: Completed and reviewed. Post-operative Diagnosis:      LV EF of 55%  Lm normal   LAD mid to distal LAD stents(? Intramyocardial) with 2 layers of stents, BHUMIKA II flow, 80% stenosis. ? Thrombus     Findings:  See full report    Complications:  none    Primary Proceduralist:   Dr.Wes Howell DO    Plan    Max med rx  rfm  Refer to [facility] for second opinion in near future. High risk lesion given recurrent stenosis, multiple PTCA for ISR and double layer of stents and ? Intramyocardial. High risk of perforation likely. ? Need for CABG.          Full procedure note to follow

## 2021-08-04 NOTE — CARE COORDINATION
The LSW notified Wilfredo Brito, admissions at [facility], that the patient is a possible discharge today. Per the 1W , the patient would like to talk with the LSW. The patient states she would like to be transferred to the [facility]. The patient states she has chest pain. The LSW updated the patient's RN and the .   Electronically signed by HIPOLITO Gallegos on 8/4/21 at 1:47 PM EDT

## 2021-08-05 VITALS
TEMPERATURE: 97.9 F | DIASTOLIC BLOOD PRESSURE: 70 MMHG | HEART RATE: 70 BPM | OXYGEN SATURATION: 99 % | SYSTOLIC BLOOD PRESSURE: 133 MMHG | RESPIRATION RATE: 18 BRPM

## 2021-08-05 PROBLEM — M25.511 CHRONIC PAIN OF BOTH SHOULDERS: Status: ACTIVE | Noted: 2021-08-05

## 2021-08-05 PROBLEM — G89.29 CHRONIC PAIN OF BOTH SHOULDERS: Status: ACTIVE | Noted: 2021-08-05

## 2021-08-05 PROBLEM — K92.1 MELENA: Status: ACTIVE | Noted: 2021-08-05

## 2021-08-05 PROBLEM — M25.512 CHRONIC PAIN OF BOTH SHOULDERS: Status: ACTIVE | Noted: 2021-08-05

## 2021-08-05 LAB
ANION GAP SERPL CALCULATED.3IONS-SCNC: 17 MEQ/L (ref 9–15)
BUN BLDV-MCNC: 53 MG/DL (ref 8–23)
CALCIUM SERPL-MCNC: 9.6 MG/DL (ref 8.5–9.9)
CHLORIDE BLD-SCNC: 95 MEQ/L (ref 95–107)
CO2: 24 MEQ/L (ref 20–31)
CREAT SERPL-MCNC: 6.37 MG/DL (ref 0.5–0.9)
GFR AFRICAN AMERICAN: 8
GFR NON-AFRICAN AMERICAN: 6.6
GLUCOSE BLD-MCNC: 155 MG/DL (ref 60–115)
GLUCOSE BLD-MCNC: 168 MG/DL (ref 60–115)
GLUCOSE BLD-MCNC: 65 MG/DL (ref 70–99)
GLUCOSE BLD-MCNC: 71 MG/DL (ref 60–115)
PERFORMED ON: ABNORMAL
PERFORMED ON: ABNORMAL
PERFORMED ON: NORMAL
POTASSIUM SERPL-SCNC: 4.2 MEQ/L (ref 3.4–4.9)
SODIUM BLD-SCNC: 136 MEQ/L (ref 135–144)

## 2021-08-05 PROCEDURE — 36415 COLL VENOUS BLD VENIPUNCTURE: CPT

## 2021-08-05 PROCEDURE — 2060000000 HC ICU INTERMEDIATE R&B

## 2021-08-05 PROCEDURE — 6370000000 HC RX 637 (ALT 250 FOR IP): Performed by: INTERNAL MEDICINE

## 2021-08-05 PROCEDURE — 97165 OT EVAL LOW COMPLEX 30 MIN: CPT

## 2021-08-05 PROCEDURE — 2580000003 HC RX 258: Performed by: PHYSICIAN ASSISTANT

## 2021-08-05 PROCEDURE — 90935 HEMODIALYSIS ONE EVALUATION: CPT

## 2021-08-05 PROCEDURE — 2580000003 HC RX 258: Performed by: INTERNAL MEDICINE

## 2021-08-05 PROCEDURE — 99239 HOSP IP/OBS DSCHRG MGMT >30: CPT | Performed by: INTERNAL MEDICINE

## 2021-08-05 PROCEDURE — 6360000002 HC RX W HCPCS: Performed by: PHYSICIAN ASSISTANT

## 2021-08-05 PROCEDURE — 80048 BASIC METABOLIC PNL TOTAL CA: CPT

## 2021-08-05 RX ADMIN — HEPARIN SODIUM 5000 UNITS: 5000 INJECTION INTRAVENOUS; SUBCUTANEOUS at 05:52

## 2021-08-05 RX ADMIN — PANTOPRAZOLE SODIUM 20 MG: 20 TABLET, DELAYED RELEASE ORAL at 14:34

## 2021-08-05 RX ADMIN — TICAGRELOR 90 MG: 90 TABLET ORAL at 20:43

## 2021-08-05 RX ADMIN — ARIPIPRAZOLE 5 MG: 5 TABLET ORAL at 14:32

## 2021-08-05 RX ADMIN — INSULIN GLARGINE 40 UNITS: 100 INJECTION, SOLUTION SUBCUTANEOUS at 20:44

## 2021-08-05 RX ADMIN — ASPIRIN 81 MG: 81 TABLET, CHEWABLE ORAL at 14:34

## 2021-08-05 RX ADMIN — ISOSORBIDE MONONITRATE 60 MG: 60 TABLET, EXTENDED RELEASE ORAL at 14:33

## 2021-08-05 RX ADMIN — AMLODIPINE BESYLATE 10 MG: 10 TABLET ORAL at 14:33

## 2021-08-05 RX ADMIN — METOPROLOL TARTRATE 25 MG: 25 TABLET, FILM COATED ORAL at 20:42

## 2021-08-05 RX ADMIN — Medication 10 MG: at 20:47

## 2021-08-05 RX ADMIN — HEPARIN SODIUM 5000 UNITS: 5000 INJECTION INTRAVENOUS; SUBCUTANEOUS at 20:44

## 2021-08-05 RX ADMIN — Medication 400 MG: at 14:33

## 2021-08-05 RX ADMIN — Medication 10 ML: at 09:34

## 2021-08-05 RX ADMIN — RANOLAZINE 500 MG: 500 TABLET, FILM COATED, EXTENDED RELEASE ORAL at 14:32

## 2021-08-05 RX ADMIN — Medication 10 ML: at 20:54

## 2021-08-05 RX ADMIN — TRAMADOL HYDROCHLORIDE 50 MG: 50 TABLET, FILM COATED ORAL at 20:42

## 2021-08-05 RX ADMIN — RANOLAZINE 500 MG: 500 TABLET, FILM COATED, EXTENDED RELEASE ORAL at 20:42

## 2021-08-05 RX ADMIN — TICAGRELOR 90 MG: 90 TABLET ORAL at 14:32

## 2021-08-05 RX ADMIN — METOPROLOL TARTRATE 25 MG: 25 TABLET, FILM COATED ORAL at 14:33

## 2021-08-05 RX ADMIN — ATORVASTATIN CALCIUM 40 MG: 40 TABLET, FILM COATED ORAL at 20:42

## 2021-08-05 RX ADMIN — Medication 10 ML: at 20:43

## 2021-08-05 RX ADMIN — Medication 5 ML: at 20:54

## 2021-08-05 RX ADMIN — SERTRALINE HYDROCHLORIDE 50 MG: 25 TABLET ORAL at 14:33

## 2021-08-05 ASSESSMENT — PAIN SCALES - GENERAL
PAINLEVEL_OUTOF10: 6
PAINLEVEL_OUTOF10: 0

## 2021-08-05 NOTE — PROGRESS NOTES
[facility] OCCUPATIONAL THERAPY EVALUATION - ACUTE     NAME: Karla Cockayne  : 1960 (65 y.o.)  MRN: [id number]  CODE STATUS: Full Code  Room: J851/M227-91    Date of Service: 2021    Patient Diagnosis(es): Chest pain [R07.9]   Chief Complaint   Patient presents with    Chest Pain     Patient Active Problem List    Diagnosis Date Noted    Nonrheumatic mitral valve regurgitation 2021    Nonrheumatic tricuspid valve regurgitation 2021    Need for extended care facility 2021    Multiple closed fractures of ribs of right side 2021    Depression 2021    Chronic obstructive pulmonary disease ([facility] 2021    Critical illness polyneuropathy ([facility] 2021    Compression fracture of spine ([facility] 2021    Closed rib fracture 2021    Chest wall contusion, left, initial encounter 2021    Contusion of right chest wall 2021    Falls frequently 2021    Post PTCA     Headache, unspecified 2021    COVID-19 2020    Thrush 2020    Moderate persistent asthma without complication     Weakness 2020    Difficulty in walking 2020    Atherosclerotic heart disease of native coronary artery with unspecified angina pectoris ([facility] 2020    Essential (primary) hypertension 2020    Pain, unspecified 2020    ESRD (end stage renal disease) on dialysis ([facility] 2020    Other seizures ([facility] 2020    Encounter for immunization 2020    Paranoid schizophrenia ([facility] 2020    Renal failure 2020    Recurrent falls 2020    Angina at rest [facility]) 2020    Edema 2019    Closed supracondylar fracture of right humerus 2019    Other chronic pain 2019    Palliative care patient 2019    Class 2 severe obesity with serious comorbidity and body mass index (BMI) of 36.0 to 36.9 in adult [facility]) 2019    Sleep apnea, unspecified 2019  Pulmonary hypertension, unspecified ([address] 11/05/2019    Chronic diastolic congestive heart failure ([address] 10/04/2019    Uncontrolled type 2 diabetes mellitus with hyperglycemia (HCC)     Shortness of breath 10/02/2019    Tardive dyskinesia 05/16/2019    Angina, class II ([address]     Controlled type 2 diabetes mellitus with diabetic neuropathy, with long-term current use of insulin ([address] 02/24/2017    Other specified diabetes mellitus with diabetic neuropathy, unspecified ([address] 08/04/2016    Stented coronary artery-plan is to stay on Plavix indefinately per Dr Vicenta Israel 06/02/2016    History of seizures     Urinary incontinence due to cognitive impairment     History of type C viral hepatitis     Diabetic nephropathy with proteinuria ([address]     Incontinence of urine 04/07/2014    Vitamin D insufficiency 02/04/2014    Vitamin B 12 deficiency 06/05/2013    Cerebral microvascular disease 06/05/2013    Hemiparesis, left ([address] 01/01/2013    Schizophrenia, paranoid, chronic     Metabolic syndrome     Mixed hyperlipidemia 12/09/2010    Other hammer toe (acquired) 12/13/2007        Past Medical History:   Diagnosis Date    Anxiety     CAD S/P percutaneous coronary angioplasty 2015, 2018    stents per dr Bg Gray    CHF (congestive heart failure) ([address]     CKD (chronic kidney disease) stage 4, GFR 15-29 ml/min ([address] 2/24/2018    CKD stage 4 due to type 2 diabetes mellitus ([address]     COPD (chronic obstructive pulmonary disease) ([address]     Diabetic nephropathy with proteinuria ([address] 2014    DJD (degenerative joint disease) of knee     Dr Joann Cody GERD (gastroesophageal reflux disease)     Hemiparesis, left ([address] 2013    [facility]    Hemodialysis patient [facility])     History of heart failure     History of seizures     History of type C viral hepatitis     HTN (hypertension)     Hyperlipidemia     Impaired mobility and activities of daily living    Keesha Varela Mediastinal lymphadenopathy     Katie Alarcon    Metabolic syndrome     Moderate persistent asthma without complication 4/10/8766    Need for extended care facility 2021    Neurogenic urinary incontinence 2013    Neuropathy in diabetes [facility])     Nonrheumatic mitral valve regurgitation 2021    Nonrheumatic tricuspid valve regurgitation 2021    Obesity (BMI 30-39. 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 Marcia Montiel      x1 Dr. Barber Echols, Dr Gil Dash CATH LAB PROCEDURE  10/02/2019    HYSTERECTOMY, TOTAL ABDOMINAL      one ovary intact, Dr Carmen Bond, menorrhagia    TN TOTAL KNEE ARTHROPLASTY Left 2018    LEFT KNEE TOTAL KNEE ARTHROPLASTY, SHAYNA, NERVE BLOCK performed by Светлана Noriega MD at [address] [place] [address] Right     TOTAL KNEE ARTHROPLASTY  16    Dr Connie Ornelas TUNNELED [address] Right 2020    tunneled HD catheter per Dr Briceno Bernard: Daria Meade in place: Yes  Type of devices: Call light within reach   Initially in place: No    Subjective:Pt pleasant and cooperative with session.        Pain Reassessment: 0/10 pain reported          Prior Level of Function:  Social/Functional History  Lives With:  (grandchildren)  Type of Home: House  Home Layout: Two level, One level, 1/2 bath on main level, Able to Live on Main level with bedroom/bathroom  Home Access: Ramped entrance  Bathroom Shower/Tub: Tub/Shower unit  Bathroom Equipment: Shower chair, Hand-held shower  Home Equipment: 4 wheeled walker  ADL Assistance: Needs assistance (daughter assists)  Homemaking Assistance:  (daughter does cooking and cleaning)  Homemaking Responsibilities: No  Ambulation Assistance: Independent (rollator)  Transfer Assistance: Independent  Active : No    OBJECTIVE:     Orientation Status:  Orientation  Overall Orientation Status: Within Functional Limits    Observation:  Observation/Palpation  Posture: Good  Observation: right arm bandage    Cognition Status:  Cognition  Overall Cognitive Status: WFL    Perception Status:  Perception  Overall Perceptual Status: WFL    Sensation Status:  Sensation  Overall Sensation Status: Impaired  Light Touch: Partial deficits in the RLE, Partial deficits in the LLE, Partial deficits in the RUE, Partial deficits in the LUE    Vision and Hearing Status:  Vision  Vision: Impaired  Vision Exceptions: Wears glasses for distance  Hearing  Hearing: Within functional limits     ROM:   LUE AROM (degrees)  LUE AROM : WFL  Left Hand AROM (degrees)  Left Hand AROM: WFL  RUE AROM (degrees)  RUE AROM : WFL  Right Hand AROM (degrees)  Right Hand AROM: WFL    Strength:  LUE Strength  L Hand General: 4/5  RUE Strength  Gross RUE Strength: WFL  R Hand General: 4/5    Coordination, Tone, Quality of Movement:    Tone RUE  RUE Tone: Normotonic  Tone LUE  LUE Tone: Normotonic  Coordination  Movements Are Fluid And Coordinated: No  Coordination and Movement description: Decreased speed, Right UE, Left UE    Hand Dominance:  Hand Dominance  Hand Dominance: Left    ADL Status:  ADL  Feeding: Unable to assess(comment)  Grooming: Setup  UE Bathing: Setup  LE Bathing: Setup  UE Dressing: Setup  LE Dressing: Setup  Toileting: Unable to assess(comment)          Therapy key for assistance levels -   Independent = Pt. is able to perform task with no assistance but may require a device   Stand by assistance = Pt. does not perform task at an independent level but does not need physical assistance, requires verbal cues  Minimal, Moderate, Maximal Assistance = Pt. requires physical assistance (25%, 50%, 75% assist from helper) for task but is able to actively participate in task   Dependent = Pt. requires total assistance with task and is not able to actively participate with task completion     Functional Mobility:     Transfers  Sit to stand: Supervision  Stand to sit: Supervision    Bed Mobility  Bed mobility  Supine to Sit: Independent  Sit to Supine: Independent    Seated and Standing Balance:  Balance  Sitting Balance: Supervision  Standing Balance: Supervision    Functional Endurance:  Activity Tolerance  Activity Tolerance: Patient Tolerated treatment well    D/C Recommendations:  OT D/C RECOMMENDATIONS  REQUIRES OT FOLLOW UP: No    Equipment Recommendations:       OT Education:        OT Follow Up:  OT D/C RECOMMENDATIONS  REQUIRES OT FOLLOW UP: No       Assessment/Discharge Disposition:     Prognosis: Good  Discharge Recommendations: Continue to assess pending progress  Decision Making: Low Complexity  History: multiple  Exam: no deficits  Assistance / Modification: no assist    Six Click Score    How much help for putting on and taking off regular lower body clothing?: None  How much help for Bathing?: None  How much help for Toileting?: None  How much help for putting on and taking off regular upper body clothing?: None  How much help for taking care of personal grooming?: None  How much help for eating meals?: None  AM-[facility] Inpatient Daily Activity Raw Score: 24  AM-[facility] Inpatient ADL T-Scale Score : 57.54  ADL Inpatient CMS 0-100% Score: 0    Plan:  Plan  Times per week: N/A    Goals:   Patient will:      Patient Goal: Patient goals :  To return to home when ready      Discussed and agreed upon: Yes Comments:     Therapy Time:   OT Individual Minutes  Time In: 0902  Time Out: 0915  Minutes: 13    Eval: 13 minutes     Electronically signed by:    CATHY Chauhan, ELDA/L  8/2/9876, 9:59 AM Electronically signed by CATHY Chauhan on 0/1/51 at 9:56 AM EDT

## 2021-08-05 NOTE — PROGRESS NOTES
Name: [facility] And : [facility]  Ailyn José  Date: 7/16/2021     Subjective:     Chief Complaint   Patient presents with    Follow-up       HPI  Mecca Glaser is a 59 y.o. female being seen today for evaluation of acute rehab progress, weakness, difficulty walking, end-stage renal date disease with hemodialysis, diabetes, blood in stool. Resident admitted from home due to weakness and frequent falls for acute rehab. She is participating in physical and occupational therapy, making progress. She is up with a Rollator, is able to walk more than 100 feet. Requires assist of 1 with ADLs mobility transfers. States \"I think I will be here couple weeks just to get a bit stronger and work on balance. \"  She denies complaints of chest pain chest palpitations irregular heartbeat lightheadedness dizziness nausea vomiting diarrhea constipation. She did state that she had a hard stool yesterday and had some blood around her stool. Denies hemorrhoids. She is complaining of 8 out of 10 pain in both of her shoulders which she states is chronic. Right now pain is relieved with rest and Tylenol but she states Tylenol is not helping; pain is exacerbated with therapy. She is requesting tramadol which he uses on occasion for pain management. Nursing staff report labs from today H&H 10.8 and 36.1 hemoglobin A1c 6.6 TSH 0.856. Resident is a type II diabetic with neuropathy, blood sugar was 163 today, current medication Lantus and NovoLog sliding scale. She has dialysis 3 days a week on Tuesday Thursday and Saturday, scheduled to go for tomorrow. Vital signs stable no fever.     Past Medical History:   Diagnosis Date    Anxiety     CAD S/P percutaneous coronary angioplasty 2015, 2018    stents per dr Ross Delgado    CHF (congestive heart failure) ([facility]     CKD (chronic kidney disease) stage 4, GFR 15-29 ml/min ([address] 2/24/2018    CKD stage 4 due to type 2 diabetes mellitus ([address]     COPD (chronic obstructive pulmonary disease) (HCC)     Diabetic nephropathy with proteinuria ([address] 2014    DJD (degenerative joint disease) of knee     Dr Cheney Child GERD (gastroesophageal reflux disease)     Hemiparesis, left ([address] 2013    entered Assisted Living ([place])    Hemodialysis patient [facility])     History of heart failure     History of seizures     History of type C viral hepatitis     HTN (hypertension)     Hyperlipidemia     Impaired mobility and activities of daily living     Mediastinal lymphadenopathy 2013    Jean Cuevas    Metabolic syndrome     Moderate persistent asthma without complication 1/49/6104    Need for extended care facility 7/7/2021    Neurogenic urinary incontinence 2013    Neuropathy in diabetes [facility])     Nonrheumatic mitral valve regurgitation 7/7/2021    Nonrheumatic tricuspid valve regurgitation 7/7/2021    Obesity (BMI 30-39. 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         Objective:   /70   Pulse 70   Temp 97.9 °F (36.6 °C)   Resp 18   LMP  (LMP Unknown)   SpO2 99%     Physical Exam Constitutional:  up in chair, well-developed, in no acute distess  Eyes: PERRLA, conjunctiva/lids normal, sclera clear, EOMI  ENT: Oropharynx normal, no nasal discharge, mucous membranes moist  Neck: Neck supple, no JVD, no thyromegaly, no lymphadenopathy  Pulmonary/Chest:  breathing unlabored, chest excursion symmetrical, no use of accessory muscles, lung sounds clear, no shortness of breath at rest, dyspnea with activity  Cardiovascular:  S1-S2 regular, no murmur, 2+ DP x 2, no edema  Abd/GI:  abdomen soft, round, non-distended, non-tender, no masses, active bowel sounds x 4 quadrants, no visible hemorrhoid  MS/Extremities:  HIGH, ROM limited, abnormal gait, no clubbing, no cyanosis  Psych:  A/O x 3, cooperative with care, no anxiety, appropriate mood and affect  Skin:  warm and dry, color normal, no lesions, no rashes     Diagnosis Orders   1. Weakness     2. Difficulty in walking     3. Controlled type 2 diabetes mellitus with diabetic neuropathy, with long-term current use of insulin ([facility]     4. ESRD (end stage renal disease) on dialysis ([facility]     5. Chronic pain of both shoulders     6. Melena           Assessment and Plan:      1. Weakness  Continue PT and OT to focus on strengthening    2. Difficulty in walking  Continue PT with a focus on endurance balance gait    3. Controlled type 2 diabetes mellitus with diabetic neuropathy, with long-term current use of insulin (HCC)  Continue Lantus and insulin sliding scale as ordered    4. ESRD (end stage renal disease) on dialysis [facility]  Dialysis Tuesday Thursday and Saturday    5. Chronic pain of both shoulders  Voltaren gel 1% apply 2 g to right and 2 g to left shoulder twice daily. Tramadol 50 mg p.o. every 8 hours as needed for pain  Colace 100 mg p.o. twice daily    6. Melena  Stool for occult blood x2. No blood in stool today. Reviewed labs:  Yes      I have reviewed the patient's medical history in detail and updated the computerized patient record.     This note was partially generated using Dragon voice recognition system, and there may be some incorrect words, spellings, punctuation that were not noticed in checking the note before saving.     Milvia Roberto, APRN-CNP

## 2021-08-05 NOTE — PROGRESS NOTES
Nephrology Progress Note    Assessment:  ESRDX DM type-2  OHDX high CAD dx d/t stenting past  COPD  Bipolar/schizo  Anemia  Hypertension controlled        Plan:no new issues dialysis today needs refferal [facility] 2nd opinion    Patient Active Problem List:     Atherosclerotic heart disease of native coronary artery with unspecified angina pectoris (HCC)     Schizophrenia, paranoid, chronic     Metabolic syndrome     Vitamin B 12 deficiency     Cerebral microvascular disease     Mixed hyperlipidemia     Other hammer toe (acquired)     Vitamin D insufficiency     Incontinence of urine     Diabetic nephropathy with proteinuria ([address]     Essential (primary) hypertension     History of type C viral hepatitis     Urinary incontinence due to cognitive impairment     History of seizures     Stented coronary artery-plan is to stay on Plavix indefinately per Dr Vicenta Israel     Other specified diabetes mellitus with diabetic neuropathy, unspecified ([address]     Controlled type 2 diabetes mellitus with diabetic neuropathy, with long-term current use of insulin (HCC)     Hemiparesis, left (HCC)     Angina, class II ([address]     Pain, unspecified     Tardive dyskinesia     Shortness of breath     Uncontrolled type 2 diabetes mellitus with hyperglycemia (HCC)     Chronic diastolic congestive heart failure (HCC)     Sleep apnea, unspecified     Pulmonary hypertension, unspecified (HCC)     Class 2 severe obesity with serious comorbidity and body mass index (BMI) of 36.0 to 36.9 in adult [facility])     Edema     Closed supracondylar fracture of right humerus     Other chronic pain     Palliative care patient     Angina at rest [facility])     Recurrent falls     Renal failure     Difficulty in walking     ESRD (end stage renal disease) on dialysis ([address]     Weakness     Other seizures (HCC)     Moderate persistent asthma without complication     Thrush     COVID-19     Post PTCA     Falls frequently     Contusion of right chest wall     Chest wall contusion, left, initial encounter     Headache, unspecified     Encounter for immunization     Paranoid schizophrenia ([facility]     Compression fracture of spine ([facility]     Closed rib fracture     Depression     Chronic obstructive pulmonary disease (HCC)     Critical illness polyneuropathy ([facility]     Multiple closed fractures of ribs of right side     Nonrheumatic mitral valve regurgitation     Nonrheumatic tricuspid valve regurgitation     Need for extended care facility      Subjective:  Admit Date: 8/1/2021    Interval History: no real issues    Medications:  Scheduled Meds:   sodium chloride flush  5-40 mL Intravenous 2 times per day    heparin (porcine)  5,000 Units Subcutaneous 3 times per day    lidocaine  1 patch Transdermal Daily    sodium chloride flush  5-40 mL Intravenous 2 times per day    heparin (porcine)  2,000 Units Intravenous Once    heparin (porcine)  1,000 Units Intravenous Once    sertraline  50 mg Oral Daily    metoprolol tartrate  25 mg Oral BID    aspirin  81 mg Oral Daily    insulin glargine  40 Units Subcutaneous Nightly    atorvastatin  40 mg Oral Nightly    amLODIPine  10 mg Oral Daily    ticagrelor  90 mg Oral BID    isosorbide mononitrate  60 mg Oral Daily    insulin glargine  55 Units Subcutaneous Nightly    magnesium oxide  400 mg Oral Daily    melatonin  10 mg Oral Nightly    pantoprazole  20 mg Oral Daily    ranolazine  500 mg Oral BID    ARIPiprazole  5 mg Oral Daily    insulin lispro  0-6 Units Subcutaneous TID WC    insulin lispro  0-3 Units Subcutaneous Nightly     Continuous Infusions:   sodium chloride 75 mL/hr at 08/04/21 0810    sodium chloride      sodium chloride      dextrose         CBC:   Recent Labs     08/03/21  0653   WBC 7.9   HGB 11.2*        CMP:    Recent Labs     08/04/21  0715 08/05/21  0621    136   K 4.3 4.2   CL 94* 95   CO2 26 24   BUN 32* 53*   CREATININE 4.63* 6.37*   GLUCOSE 130* 65*   CALCIUM 9.7 9.6   LABGLOM 9.5* 6.6* Troponin: No results for input(s): TROPONINI in the last 72 hours. BNP: No results for input(s): BNP in the last 72 hours. INR: No results for input(s): INR in the last 72 hours. Lipids: No results for input(s): CHOL, LDLDIRECT, TRIG, HDL, AMYLASE, LIPASE in the last 72 hours. Liver: No results for input(s): AST, ALT, ALKPHOS, PROT, LABALBU, BILITOT in the last 72 hours. Invalid input(s): BILDIR  Iron:  No results for input(s): IRONS, FERRITIN in the last 72 hours. Invalid input(s): LABIRONS  Urinalysis: No results for input(s): UA in the last 72 hours.     Objective:  Vitals: BP (!) 117/56   Pulse 60   Temp 97.9 °F (36.6 °C) (Oral)   Resp 18   Ht 5' 7\" (1.702 m)   Wt 216 lb 1.6 oz (98 kg)   LMP  (LMP Unknown)   SpO2 100%   BMI 33.85 kg/m²    Wt Readings from Last 3 Encounters:   08/05/21 216 lb 1.6 oz (98 kg)   07/07/21 219 lb 6.4 oz (99.5 kg)   06/28/21 219 lb 3.2 oz (99.4 kg)      24HR INTAKE/OUTPUT:  No intake or output data in the 24 hours ending 08/05/21 0821    General: alert, in no apparent distress  HEENT: normocephalic, atraumatic, anicteric  Neck: supple, no mass  Lungs: non-labored respirations, clear to auscultation bilaterally  Heart: regular rate and rhythm, no murmurs or rubs  Abdomen: soft, non-tender, non-distendedobese  Ext: no cyanosis, no peripheral edema access left arm  Neuro: alert and oriented, no gross abnormalities  Psych: normal mood and affect  Skin: no rash      Electronically signed by Kirk Valdes DO, MD

## 2021-08-05 NOTE — DISCHARGE SUMMARY
Discharge Summary    Date: 8/5/2021  Patient Name: Nolberto Abbott YOB: 1960 Age: 59 y.o. Admit Date: 8/1/2021  Discharge Date: 8/5/2021  Discharge Condition: Good    Admission Diagnosis  Chest pain (R07.9)     Discharge Diagnosis  Active Problems: Angina at rest (HCC)Resolved Problems: * No resolved hospital problems. [facility] Stay  Narrative of Hospital Course:  Patient presented with anginal symptoms and underwent cardiac catheterization showing mid to distal LAD in-stent restenosis. This is considered a high risk lesion given small caliber vessel, double layer of stents, heavily calcified lesion. Patient will be transferred to [facility] for second opinion. Possible evaluation for CABG. Consultants:  IP CONSULT TO NEPHROLOGY    Surgeries/procedures Performed:       Treatments:    Cardiac Medications        Discharge Plan/Disposition:  Home    Hospital/Incidental Findings Requiring Follow Up:    Patient Instructions:    Diet: Cardiac Diet    Activity:No Lifting, Driving or Strenuous Excercise  For number of days (if applicable): Other Instructions:    Provider Follow-Up:   No follow-ups on file.      Significant Diagnostic Studies:    Recent Labs:  Admission on 08/01/2021WBC                                           Date: 08/01/2021Value: 9.6         Ref range: 4.8 - 10.8 K/uL    Status: FinalRBC                                           Date: 08/01/2021Value: 3.41*       Ref range: 4.20 - 5.40 M/uL   Status: FinalHemoglobin                                    Date: 08/01/2021Value: 11.2*       Ref range: 12.0 - 16.0 g/dL   Status: FinalHematocrit                                    Date: 08/01/2021Value: 32.3*       Ref range: 37.0 - 47.0 %      Status: FinalMCV                                           Date: 08/01/2021Value: 94.7        Ref range: 82.0 - 100.0 fL    Status: [address] [place]                                           Date: 08/01/2021Value: 32.8*       Ref range: 27.0 - 31.3 pg Status: [address]                                          Date: 08/01/2021Value: 34.7        Ref range: 33.0 - 37.0 %      Status: FinalRDW                                           Date: 08/01/2021Value: 18.7*       Ref range: 11.5 - 14.5 %      Status: FinalPlatelets                                     Date: 08/01/2021Value: 213         Ref range: 130 - 400 K/uL     Status: FinalNeutrophils %                                 Date: 08/01/2021Value: 63.8        Ref range: %                  Status: FinalLymphocytes %                                 Date: 08/01/2021Value: 20.2        Ref range: %                  Status: FinalMonocytes %                                   Date: 08/01/2021Value: 9.6         Ref range: %                  Status: FinalEosinophils %                                 Date: 08/01/2021Value: 6.0         Ref range: %                  Status: FinalBasophils %                                   Date: 08/01/2021Value: 0.4         Ref range: %                  Status: FinalNeutrophils Absolute                          Date: 08/01/2021Value: 6.1         Ref range: 1.4 - 6.5 K/uL     Status: FinalLymphocytes Absolute                          Date: 08/01/2021Value: 1.9         Ref range: 1.0 - 4.8 K/uL     Status: FinalMonocytes Absolute                            Date: 08/01/2021Value: 0.9*        Ref range: 0.2 - 0.8 K/uL     Status: FinalEosinophils Absolute                          Date: 08/01/2021Value: 0.6         Ref range: 0.0 - 0.7 K/uL     Status: FinalBasophils Absolute                            Date: 08/01/2021Value: 0.0         Ref range: 0.0 - 0.2 K/uL     Status: FinalSodium                                        Date: 08/01/2021Value: 128*        Ref range: 135 - 144 mEq/L    Status: FinalPotassium reflex Magnesium                    Date: 08/01/2021Value: 3.9         Ref range: 3.4 - 4.9 mEq/L    Status: FinalChloride                                      Date: 08/01/2021Value: 88* Ref range: 95 - 107 mEq/L     Status: FinalCO2                                           Date: 08/01/2021Value: 24          Ref range: 20 - 31 mEq/L      Status: FinalAnion Gap                                     Date: 08/01/2021Value: 16*         Ref range: 9 - 15 mEq/L       Status: FinalGlucose                                       Date: 08/01/2021Value: 260*        Ref range: 70 - 99 mg/dL      Status: FinalBUN                                           Date: 08/01/2021Value: 37*         Ref range: 8 - 23 mg/dL       Status: FinalCREATININE                                    Date: 08/01/2021Value: 5.12*       Ref range: 0.50 - 0.90 mg/dL  Status: FinalGFR Non-                      Date: 08/01/2021Value: 8.5*        Ref range: >60                Status: Final              Comment: >60 mL/min/1.73m2 EGFR, calc. for ages 25 and older using theMDRD formula (not corrected for weight), is valid for stablerenal function. GFR                           Date: 08/01/2021Value: 10.3*       Ref range: >60                Status: Final              Comment: >60 mL/min/1.73m2 EGFR, calc. for ages 25 and older using theMDRD formula (not corrected for weight), is valid for stablerenal function. Calcium                                       Date: 08/01/2021Value: 9.7         Ref range: 8.5 - 9.9 mg/dL    Status: FinalTotal Protein                                 Date: 08/01/2021Value: 6.9         Ref range: 6.3 - 8.0 g/dL     Status: FinalAlbumin                                       Date: 08/01/2021Value: 4.1         Ref range: 3.5 - 4.6 g/dL     Status: FinalTotal Bilirubin                               Date: 08/01/2021Value: 0.5         Ref range: 0.2 - 0.7 mg/dL    Status: FinalAlkaline Phosphatase                          Date: 08/01/2021Value: 98          Ref range: 40 - 130 U/L       Status: FinalALT                                           Date: 08/01/2021Value: 15          Ref range: 0 - 33 U/L         Status: FinalAST                                           Date: 08/01/2021Value: 20          Ref range: 0 - 35 U/L         Status: FinalGlobulin                                      Date: 08/01/2021Value: 2.8         Ref range: 2.3 - 3.5 g/dL     Status: FinalTroponin                                      Date: 08/01/2021Value: 0.022*      Ref range: 0.000 - 0.010 ng*  Status: Final              Comment: Methodology by Troponin T. Troponin                                      Date: 08/02/2021Value: 0.021*      Ref range: 0.000 - 0.010 ng*  Status: Final              Comment: Methodology by Troponin T. SARS-CoV-2, NAAT                              Date: 08/02/2021Value: Not Detected                   Ref range: Not Detected       Status: Final              Comment: Rapid NAAT:   Negative results should be treated as presumptive and,if inconsistent with clinical signs and symptoms or necessary forpatient management, should be tested with an alternative molecularassay. Negative results do not preclude SARS-CoV-2 infection andshould not be used as the sole basis for patient management decisions. This test has been authorized by the FDA under an Emergency UseAuthorization (EUA) for use by authorized laboratories. Fact sheet for [employer] sheet for Patients: [place]: Isothermal Nucleic Acid AmplificationTroponin                                      Date: 08/02/2021Value: 0.021*      Ref range: 0.000 - 0.010 ng*  Status: Final              Comment: Methodology by Troponin T. POC Glucose                                   Date: 08/02/2021Value: 193*        Ref range: 60 - 115 mg/dl     Status: FinalPerformed on                                  Date: 08/02/2021Value: ACCU-CHEK     Status: [address] [facility]                                           Date: 08/03/2021Value: 7.9         Ref range: 4.8 - 10.8 K/uL    Status: [address]                                           Date: 08/03/2021Value: 3.43*       Ref range: 4.20 - 5.40 M/uL   Status: FinalHemoglobin                                    Date: 08/03/2021Value: 11.2*       Ref range: 12.0 - 16.0 g/dL   Status: FinalHematocrit                                    Date: 08/03/2021Value: 32.4*       Ref range: 37.0 - 47.0 %      Status: FinalMCV                                           Date: 08/03/2021Value: 94.4        Ref range: 82.0 - 100.0 fL    Status: [address] [place]                                           Date: 08/03/2021Value: 32.8*       Ref range: 27.0 - 31.3 pg     Status: [address]                                          Date: 08/03/2021Value: 34.7        Ref range: 33.0 - 37.0 %      Status: FinalRDW                                           Date: 08/03/2021Value: 18.0*       Ref range: 11.5 - 14.5 %      Status: FinalPlatelets                                     Date: 08/03/2021Value: 204         Ref range: 130 - 400 K/uL     Status: FinalPOC Glucose                                   Date: 08/02/2021Value: 180*        Ref range: 60 - 115 mg/dl     Status: FinalPerformed on                                  Date: 08/02/2021Value: ACCU-CHEK     Status: FinalVentricular Rate                              Date: 08/03/2021Value: 69          Ref range: BPM                Status: FinalAtrial Rate                                   Date: 08/03/2021Value: 69          Ref range: BPM                Status: FinalP-R Interval                                  Date: 08/03/2021Value: 280         Ref range: ms                 Status: FinalQRS Duration                                  Date: 08/03/2021Value: 142         Ref range: ms                 Status: FinalQ-T Interval                                  Date: 08/03/2021Value: 462         Ref range: ms                 Status: FinalQTc Calculation (Bazett)                      Date: 08/03/2021Value: 495         Ref range: ms Status: FinalP Axis                                        Date: 08/03/2021Value: 40          Ref range: degrees            Status: FinalR Axis                                        Date: 08/03/2021Value: -62         Ref range: degrees            Status: FinalT Axis                                        Date: 08/03/2021Value: 54          Ref range: degrees            Status: FinalPOC Glucose                                   Date: 08/03/2021Value: 182*        Ref range: 60 - 115 mg/dl     Status: FinalPerformed on                                  Date: 08/03/2021Value: ACCU-CHEK     Status: FinalPOC Glucose                                   Date: 08/03/2021Value: 262*        Ref range: 60 - 115 mg/dl     Status: FinalPerformed on                                  Date: 08/03/2021Value: ACCU-CHEK     Status: FinalSodium                                        Date: 08/04/2021Value: 137         Ref range: 135 - 144 mEq/L    Status: FinalPotassium                                     Date: 08/04/2021Value: 4.3         Ref range: 3.4 - 4.9 mEq/L    Status: FinalChloride                                      Date: 08/04/2021Value: 94*         Ref range: 95 - 107 mEq/L     Status: FinalCO2                                           Date: 08/04/2021Value: 26          Ref range: 20 - 31 mEq/L      Status: FinalAnion Gap                                     Date: 08/04/2021Value: 17*         Ref range: 9 - 15 mEq/L       Status: FinalGlucose                                       Date: 08/04/2021Value: 130*        Ref range: 70 - 99 mg/dL      Status: FinalBUN                                           Date: 08/04/2021Value: 32*         Ref range: 8 - 23 mg/dL       Status: FinalCREATININE                                    Date: 08/04/2021Value: 4.63*       Ref range: 0.50 - 0.90 mg/dL  Status: FinalGFR Non-                      Date: 08/04/2021Value: 9.5*        Ref range: >60                Status: Final Comment: >60 mL/min/1.73m2 EGFR, calc. for ages 25 and older using theMDRD formula (not corrected for weight), is valid for stablerenal function. GFR                           Date: 08/04/2021Value: 11.5*       Ref range: >60                Status: Final              Comment: >60 mL/min/1.73m2 EGFR, calc. for ages 25 and older using theMDRD formula (not corrected for weight), is valid for stablerenal function. Calcium                                       Date: 08/04/2021Value: 9.7         Ref range: 8.5 - 9.9 mg/dL    Status: FinalPOC Glucose                                   Date: 08/03/2021Value: 103         Ref range: 60 - 115 mg/dl     Status: FinalPerformed on                                  Date: 08/03/2021Value: ACCU-CHEK     Status: FinalPOC Glucose                                   Date: 08/03/2021Value: 166*        Ref range: 60 - 115 mg/dl     Status: FinalPerformed on                                  Date: 08/03/2021Value: ACCU-CHEK     Status: FinalPOC Glucose                                   Date: 08/04/2021Value: 144*        Ref range: 60 - 115 mg/dl     Status: FinalPerformed on                                  Date: 08/04/2021Value: ACCU-CHEK     Status: FinalVentricular Rate                              Date: 08/01/2021Value: 75          Ref range: BPM                Status: FinalAtrial Rate                                   Date: 08/01/2021Value: 75          Ref range: BPM                Status: FinalP-R Interval                                  Date: 08/01/2021Value: 274         Ref range: ms                 Status: FinalQRS Duration                                  Date: 08/01/2021Value: 140         Ref range: ms                 Status: FinalQ-T Interval                                  Date: 08/01/2021Value: 450         Ref range: ms                 Status: FinalQTc Calculation (Bazett)                      Date: 08/01/2021Value: 502         Ref range: ms Status: FinalP Axis                                        Date: 08/01/2021Value: 48          Ref range: degrees            Status: FinalR Axis                                        Date: 08/01/2021Value: -62         Ref range: degrees            Status: FinalT Axis                                        Date: 08/01/2021Value: 61          Ref range: degrees            Status: FinalPOC Glucose                                   Date: 08/04/2021Value: 186*        Ref range: 60 - 115 mg/dl     Status: FinalPerformed on                                  Date: 08/04/2021Value: ACCU-CHEK     Status: FinalSodium                                        Date: 08/05/2021Value: 136         Ref range: 135 - 144 mEq/L    Status: FinalPotassium                                     Date: 08/05/2021Value: 4.2         Ref range: 3.4 - 4.9 mEq/L    Status: FinalChloride                                      Date: 08/05/2021Value: 95          Ref range: 95 - 107 mEq/L     Status: FinalCO2                                           Date: 08/05/2021Value: 24          Ref range: 20 - 31 mEq/L      Status: FinalAnion Gap                                     Date: 08/05/2021Value: 17*         Ref range: 9 - 15 mEq/L       Status: FinalGlucose                                       Date: 08/05/2021Value: 65*         Ref range: 70 - 99 mg/dL      Status: FinalBUN                                           Date: 08/05/2021Value: 48*         Ref range: 8 - 23 mg/dL       Status: FinalCREATININE                                    Date: 08/05/2021Value: 6.37*       Ref range: 0.50 - 0.90 mg/dL  Status: FinalGFR Non-                      Date: 08/05/2021Value: 6.6*        Ref range: >60                Status: Final              Comment: >60 mL/min/1.73m2 EGFR, calc. for ages 25 and older using theMDRD formula (not corrected for weight), is valid for stablerenal function. GFR                           Date: 08/05/2021Value: 8.0*        Ref range: >60                Status: Final              Comment: >60 mL/min/1.73m2 EGFR, calc. for ages 25 and older using theMDRD formula (not corrected for weight), is valid for stablerenal function. Calcium                                       Date: 08/05/2021Value: 9.6         Ref range: 8.5 - 9.9 mg/dL    Status: FinalPOC Glucose                                   Date: 08/04/2021Value: 186*        Ref range: 60 - 115 mg/dl     Status: FinalPerformed on                                  Date: 08/04/2021Value: ACCU-CHEK     Status: Final              Comment: Notified RN or [address] Glucose                                   Date: 08/05/2021Value: 71          Ref range: 60 - 115 mg/dl     Status: FinalPerformed on                                  Date: 08/05/2021Value: ACCU-CHEK     Status: Final              Comment: Notified RN or MD------------    Radiology last 7 days:  XR CHEST (2 VW)Result Date: 8/2/2021NO EVIDENCE OF ACTIVE CARDIOPULMONARY DISEASE, BY PORTABLE CHEST RADIOGRAPHY. [unfilled]    Discharge Medications    Current Discharge Medication List    Current Discharge Medication List    Current Discharge Medication ListCONTINUE these medications which have NOT CHANGEDtraMADol (ULTRAM) 50 MG tabletTake 50 mg by mouth every 8 hours as needed for Pain. LANTUS SOLOSTAR 100 UNIT/ML injection pen55 units at bedtimeQty: 10 pen Refills: 10insulin aspart (NOVOLOG FLEXPEN) 100 UNIT/ML injection penINJECT 8-10  units with each mealsQty: 10 pen Refills: 3melatonin 10 MG CAPS capsuleTake 1 capsule by mouth nightlyQty: 30 capsule Refills: 12Associated Diagnoses:Psychophysiological insomniaamLODIPine (NORVASC) 10 MG tabletTAKE 1 TABLET BY MOUTH DAILYQty: 30 tablet Refills: 3Associated Diagnoses:Essential (primary) hypertensionaspirin EC 81 MG EC tabletTake 1 tablet by mouth dailyQty: 30 tablet Refills: 12metoprolol tartrate (LOPRESSOR) 25 MG tabletTake 0.5 tablets by mouth 2 times dailyQty: 90 tablet Refills: 0ARIPiprazole (ABILIFY) 5 MG tabletTAKE 1 TABLET BY MOUTH ONCE DAILYQty: 30 tablet Refills: 2magnesium oxide (MAG-OX) 400 MG tabletTake 1 tablet by mouth dailyQty: 90 tablet Refills: 4atorvastatin (LIPITOR) 40 MG tabletTake 1 tablet by mouth dailyQty: 90 tablet Refills: 4docusate sodium (COLACE, DULCOLAX) 100 MG CAPSTake 100 mg by mouth 2 times daily For the prevention and treatment of constipation while taking pain medications. Qty: 30 capsule Refills: 0acetaminophen (TYLENOL) 325 MG tabletTake 2 tablets by mouth every 4 hours as needed for Pain (For mild to moderate pain (Pain 1-6 out of 10 on pain scale))Qty: 120 tablet Refills: 0triamcinolone (KENALOG) 0.1 % creamAPPLY TO AFFECTED AREAS TWICE DAILY AS DIRECTEDQty: 80 g Refills: 10nystatin (NYAMYC) 374123 UNIT/GM powderAPPLY TO ABDOMINAL FOLDS EVERY 12 HOURS AS NEEDEDQty: 60 g Refills: 10vitamin B-12 (CYANOCOBALAMIN) 100 MCG tabletTake 1 tablet by mouth dailyQty: 30 tablet Refills: 10ranolazine (RANEXA) 500 MG extended release tabletTake 1 tablet by mouth 2 times dailyQty: 180 tablet Refills: 2BRILINTA 90 MG TABS tabletTAKE 1 TABLET BY MOUTH 2 TIMES DAILYQty: 60 tablet Refills: 11Associated Diagnoses:Coronary artery disease involving native coronary artery of native heart with angina pectoris (HCC)pantoprazole (PROTONIX) 20 MG tabletTAKE 1 TABLET BY MOUTH DAILYQty: 90 tablet Refills: 3albuterol sulfate HFA (VENTOLIN HFA) 108 (90 Base) MCG/ACT inhalerInhale 2 puffs into the lungs every 6 hours as needed for Wheezingsertraline (ZOLOFT) 50 MG tabletTAKE 1 TABLET BY MOUTH DAILYQty: 90 tablet Refills: 3Associated Diagnoses:Schizophrenia, paranoid, chronic (HCC)fluticasone (FLOVENT HFA) 110 MCG/ACT inhalerInhale 2 puffs into the lungs 2 times dailyQty: 1 Inhaler Refills: 12Associated Diagnoses: Moderate persistent asthma without complicationisosorbide mononitrate (IMDUR) 60 MG extended release tabletTake 1 tablet by mouth dailyQty: 90 tablet Refills: 3nitroGLYCERIN (NITROSTAT) 0.4 MG SL tabletPlace 1 tablet under the tongue every 5 minutes as needed for Chest painInsulin Pen Needle (SURE COMFORT PEN NEEDLES) 30G X 8 MM MISCUSE AS DIRECTED FIVE TIMES A DAILYQty: 100 each Refills: 10blood glucose test strips (FREESTYLE LITE) strip1 each by Does not apply route 4 times daily (before meals and nightly) As needed. Qty: 200 strip Refills: 5Comments: **Patient requests 90 days supply**Alcohol Swabs (EASY TOUCH ALCOHOL PREP MEDIUM) 70 % PADSUSE AS DIRECTED THREE TIMES A DAYQty: 100 each Refills: 3FreeStyle Lancets MISCTest 4x dailyQty: 150 each Refills: 3!! Misc. Devices (BARIATRIC ROLLATOR) MISCRolllator with a basketQty: 1 each Refills: 0Blood Glucose Monitoring Suppl (FREESTYLE LITE) DEVI1 Device by Does not apply route daily as needed (Diabetes) Use freestyle meter to test blood sugar as neededQty: 1 Device Refills: 0Associated Diagnoses:Uncontrolled type 2 diabetes mellitus with hyperglycemia (HCC)B Complex-C-Folic Acid (NEPHRO VITAMINS) 0.8 MG TABSTake 1 tablet by mouth daily !! Misc. Devices [facility]) MISCTo use with transport outside of the house. Qty: 1 each Refills: 0Associated Diagnoses:Coronary artery disease involving native heart with angina pectoris, unspecified vessel or lesion type ([address] Chronic diastolic congestive heart failure (HCC); CKD (chronic kidney disease) stage 4, GFR 15-29 ml/min ([address] Pulmonary hypertension ([address]! ! - Potential duplicate medications found. Please discuss with provider. Current Discharge Medication List    Time Spent on Discharge:3E] minutes were spent in patient examination, evaluation, counseling as well as medication reconciliation, prescriptions for required medications, discharge plan, and follow up.     Electronically signed by Fran Patel DO on 8/5/21 at 10:35 AM EDT

## 2021-08-05 NOTE — FLOWSHEET NOTE
2005-HS assessment completed. Pt denies CP/SOB at this time. Vitals stable on RA. Plan of care discussed. Pt denies any needs at this time. Call light in reach. Falls precautions in place. Team to continue to monitor.  Electronically signed by Gordon Salazar RN on 8/4/2021 at 8:24 PM

## 2021-08-06 LAB
GLUCOSE BLD-MCNC: 123 MG/DL (ref 60–115)
GLUCOSE BLD-MCNC: 143 MG/DL (ref 60–115)
GLUCOSE BLD-MCNC: 215 MG/DL (ref 60–115)
GLUCOSE BLD-MCNC: 248 MG/DL (ref 60–115)
GLUCOSE BLD-MCNC: 278 MG/DL (ref 60–115)
PERFORMED ON: ABNORMAL

## 2021-08-06 PROCEDURE — 2060000000 HC ICU INTERMEDIATE R&B

## 2021-08-06 PROCEDURE — 2580000003 HC RX 258: Performed by: INTERNAL MEDICINE

## 2021-08-06 PROCEDURE — 6370000000 HC RX 637 (ALT 250 FOR IP): Performed by: INTERNAL MEDICINE

## 2021-08-06 PROCEDURE — 99232 SBSQ HOSP IP/OBS MODERATE 35: CPT | Performed by: INTERNAL MEDICINE

## 2021-08-06 PROCEDURE — 2580000003 HC RX 258: Performed by: PHYSICIAN ASSISTANT

## 2021-08-06 PROCEDURE — 6360000002 HC RX W HCPCS: Performed by: PHYSICIAN ASSISTANT

## 2021-08-06 RX ADMIN — Medication 10 ML: at 21:06

## 2021-08-06 RX ADMIN — Medication 10 ML: at 08:47

## 2021-08-06 RX ADMIN — Medication 10 ML: at 21:05

## 2021-08-06 RX ADMIN — AMLODIPINE BESYLATE 10 MG: 10 TABLET ORAL at 08:47

## 2021-08-06 RX ADMIN — METOPROLOL TARTRATE 25 MG: 25 TABLET, FILM COATED ORAL at 08:47

## 2021-08-06 RX ADMIN — PANTOPRAZOLE SODIUM 20 MG: 20 TABLET, DELAYED RELEASE ORAL at 08:47

## 2021-08-06 RX ADMIN — METOPROLOL TARTRATE 25 MG: 25 TABLET, FILM COATED ORAL at 21:05

## 2021-08-06 RX ADMIN — TICAGRELOR 90 MG: 90 TABLET ORAL at 21:05

## 2021-08-06 RX ADMIN — ARIPIPRAZOLE 5 MG: 5 TABLET ORAL at 08:46

## 2021-08-06 RX ADMIN — HEPARIN SODIUM 5000 UNITS: 5000 INJECTION INTRAVENOUS; SUBCUTANEOUS at 14:44

## 2021-08-06 RX ADMIN — HEPARIN SODIUM 5000 UNITS: 5000 INJECTION INTRAVENOUS; SUBCUTANEOUS at 21:07

## 2021-08-06 RX ADMIN — ASPIRIN 81 MG: 81 TABLET, CHEWABLE ORAL at 08:47

## 2021-08-06 RX ADMIN — HEPARIN SODIUM 5000 UNITS: 5000 INJECTION INTRAVENOUS; SUBCUTANEOUS at 04:56

## 2021-08-06 RX ADMIN — RANOLAZINE 500 MG: 500 TABLET, FILM COATED, EXTENDED RELEASE ORAL at 21:05

## 2021-08-06 RX ADMIN — INSULIN GLARGINE 40 UNITS: 100 INJECTION, SOLUTION SUBCUTANEOUS at 21:07

## 2021-08-06 RX ADMIN — Medication 400 MG: at 08:47

## 2021-08-06 RX ADMIN — RANOLAZINE 500 MG: 500 TABLET, FILM COATED, EXTENDED RELEASE ORAL at 08:47

## 2021-08-06 RX ADMIN — ATORVASTATIN CALCIUM 40 MG: 40 TABLET, FILM COATED ORAL at 21:05

## 2021-08-06 RX ADMIN — TRAMADOL HYDROCHLORIDE 50 MG: 50 TABLET, FILM COATED ORAL at 14:44

## 2021-08-06 RX ADMIN — TICAGRELOR 90 MG: 90 TABLET ORAL at 08:47

## 2021-08-06 RX ADMIN — ISOSORBIDE MONONITRATE 60 MG: 60 TABLET, EXTENDED RELEASE ORAL at 08:47

## 2021-08-06 RX ADMIN — SERTRALINE HYDROCHLORIDE 50 MG: 25 TABLET ORAL at 08:47

## 2021-08-06 RX ADMIN — Medication 5 ML: at 21:06

## 2021-08-06 ASSESSMENT — ENCOUNTER SYMPTOMS
CHEST TIGHTNESS: 0
COUGH: 0
BLOOD IN STOOL: 0
STRIDOR: 0
GASTROINTESTINAL NEGATIVE: 1
RESPIRATORY NEGATIVE: 1
NAUSEA: 0
WHEEZING: 0
EYES NEGATIVE: 1
SHORTNESS OF BREATH: 0

## 2021-08-06 ASSESSMENT — PAIN SCALES - GENERAL: PAINLEVEL_OUTOF10: 7

## 2021-08-06 NOTE — PROGRESS NOTES
Progress Note  Patient: Amilcar Precise  Unit/Bed: Q582/M479-11  YOB: 1960  MRN: [id number]  Acct: [de-identified]   Admitting Diagnosis: Chest pain [R07.9]  Admit Date:  8/1/2021  Hospital Day: 4    Chief Complaint: CAD    Histories:  Past Medical History:   Diagnosis Date    Anxiety     CAD S/P percutaneous coronary angioplasty 2015, 2018    stents per dr Fredrick Alejandra    CHF (congestive heart failure) ([address]     CKD (chronic kidney disease) stage 4, GFR 15-29 ml/min ([address] 2/24/2018    CKD stage 4 due to type 2 diabetes mellitus ([address]     COPD (chronic obstructive pulmonary disease) ([address]     Diabetic nephropathy with proteinuria ([address] 2014    DJD (degenerative joint disease) of knee     Dr Marisol Haley GERD (gastroesophageal reflux disease)     Hemiparesis, left ([address] 2013    entered Assisted Living ([facility])    Hemodialysis patient [facility])     History of heart failure     History of seizures     History of type C viral hepatitis     HTN (hypertension)     Hyperlipidemia     Impaired mobility and activities of daily living     Mediastinal lymphadenopathy 2013    Dr Quyen Buitrago [facility]    Metabolic syndrome     Moderate persistent asthma without complication 4/69/7449    Need for extended care facility 7/7/2021    Neurogenic urinary incontinence 2013    Neuropathy in diabetes [facility])     Nonrheumatic mitral valve regurgitation 7/7/2021    Nonrheumatic tricuspid valve regurgitation 7/7/2021    Obesity (BMI 30-39. 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 Norma Rios      x1 Dr. Marah Bingham, Dr Ryan Mcdonough CATH LAB PROCEDURE  10/02/2019    HYSTERECTOMY, TOTAL ABDOMINAL      one ovary intact, Dr Rachele Escalante, menorrhagia    ME TOTAL KNEE ARTHROPLASTY Left 2018    LEFT KNEE TOTAL KNEE ARTHROPLASTY, SHAYNA, NERVE BLOCK performed by Myriam Willams MD at [address] [place] [address] Right     TOTAL KNEE ARTHROPLASTY  16    Dr Pugh Never TUNNELED [address] Right 2020    tunneled HD catheter per Dr Roger Lopez     Family History   Problem Relation Age of Onset    Cancer Mother 76        survived   [facility] Hypertension Father     Diabetes Sister     Mental Illness Sister      Social History     Socioeconomic History    Marital status:      Spouse name: None    Number of children: 2    Years of education: None    Highest education level: None   Occupational History    Occupation: disabled   Tobacco Use    Smoking status: Passive Smoke Exposure - Never Smoker    Smokeless tobacco: Never Used   Vaping Use    Vaping Use: Never used   Substance and Sexual Activity    Alcohol use: No     Alcohol/week: 0.0 standard drinks    Drug use: No    Sexual activity: Not Currently   Other Topics Concern    None   Social History Narrative    Born in [place], one of 5    Twin sister Reyes, very ill in , 2019    Moved to [facility], , 2 children, one son and one daughter    Worked at [employer], as a nurse's aide    Disabled due to mental illness    Lived at [facility], was discharged, returned to independent living in 2017 in the daughter's house and has adjusted well    One son and one daughter, live in the same house with patient, Kayley Aguayo pays the rent    Hobbies reading (misteries)     Social Determinants of Health     Financial Resource Strain: Low Risk     Difficulty of Paying Living Expenses: Not hard at all   Food Insecurity: No Food Insecurity    Worried About Running Out of Food in the Last Year: Never true    Yung of Food in the Last Year: Never true   Transportation Needs: No Transportation Needs    Lack of Transportation (Medical): No    Lack of Transportation (Non-Medical): No   Physical Activity:     Days of Exercise per Week:     Minutes of Exercise per Session:    Stress:     Feeling of Stress :    Social Connections:     Frequency of Communication with Friends and Family:     Frequency of Social Gatherings with Friends and Family:     Attends Judaism Services:     Active Member of Clubs or Organizations:     Attends Club or Organization Meetings:     Marital Status:    Intimate Partner Violence:     Fear of Current or Ex-Partner:     Emotionally Abused:     Physically Abused:     Sexually Abused:        Subjective/HPI no cp no sob no acute events. EKG: SR 60-65        Review of Systems:   Review of Systems   Constitutional: Negative. Negative for diaphoresis and fatigue. HENT: Negative. Eyes: Negative. Respiratory: Negative. Negative for cough, chest tightness, shortness of breath, wheezing and stridor. Cardiovascular: Negative. Negative for chest pain, palpitations and leg swelling. Gastrointestinal: Negative. Negative for blood in stool and nausea. Genitourinary: Negative. Musculoskeletal: Negative. Skin: Negative. Neurological: Negative. Negative for dizziness, syncope, weakness and light-headedness. Hematological: Negative. Psychiatric/Behavioral: Negative.           Physical Examination:    BP (!) 107/58   Pulse 62   Temp 97.7 °F (36.5 °C) (Oral)   Resp 16   Ht 5' 7\" (1.702 m)   Wt 211 lb (95.7 kg)   LMP  (LMP Unknown)   SpO2 99%   BMI 33.05 kg/m² Physical Exam   Constitutional: She appears healthy. No distress. HENT:   Normal cephalic and Atraumatic   Eyes: Pupils are equal, round, and reactive to light. Neck: Thyroid normal. No JVD present. No neck adenopathy. No thyromegaly present. Cardiovascular: Normal rate, regular rhythm, normal heart sounds, intact distal pulses and normal pulses. Pulmonary/Chest: Effort normal and breath sounds normal. She has no wheezes. She has no rales. She exhibits no tenderness. Abdominal: Soft. Bowel sounds are normal. There is no abdominal tenderness. Musculoskeletal:         General: No tenderness or edema. Normal range of motion. Cervical back: Normal range of motion and neck supple. Neurological: She is alert and oriented to person, place, and time. Skin: Skin is warm. No cyanosis. Nails show no clubbing.        LABS:  CBC:   Lab Results   Component Value Date    WBC 7.9 08/03/2021    RBC 3.43 08/03/2021    HGB 11.2 08/03/2021    HCT 32.4 08/03/2021    MCV 94.4 08/03/2021    MCH 32.8 08/03/2021    MCHC 34.7 08/03/2021    RDW 18.0 08/03/2021     08/03/2021    MPV 10.0 03/05/2020     CBC with Differential:    Lab Results   Component Value Date    WBC 7.9 08/03/2021    RBC 3.43 08/03/2021    HGB 11.2 08/03/2021    HCT 32.4 08/03/2021     08/03/2021    MCV 94.4 08/03/2021    MCH 32.8 08/03/2021    MCHC 34.7 08/03/2021    RDW 18.0 08/03/2021    NRBC 0.0 07/30/2021    BANDSPCT 5 06/14/2013    LYMPHOPCT 20.2 08/01/2021    MONOPCT 9.6 08/01/2021    EOSPCT 3.5 03/05/2020    BASOPCT 0.4 08/01/2021    MONOSABS 0.9 08/01/2021    LYMPHSABS 1.9 08/01/2021    EOSABS 0.6 08/01/2021    BASOSABS 0.0 08/01/2021     CMP:    Lab Results   Component Value Date     08/05/2021    K 4.2 08/05/2021    K 3.9 08/01/2021    CL 95 08/05/2021    CO2 24 08/05/2021    BUN 53 08/05/2021    CREATININE 6.37 08/05/2021    GFRAA 8.0 08/05/2021    LABGLOM 6.6 08/05/2021    GLUCOSE 65 08/05/2021    GLUCOSE 419 07/30/2021 PROT 6.9 08/01/2021    LABALBU 4.1 08/01/2021    CALCIUM 9.6 08/05/2021    BILITOT 0.5 08/01/2021    ALKPHOS 98 08/01/2021    AST 20 08/01/2021    ALT 15 08/01/2021     BMP:    Lab Results   Component Value Date     08/05/2021    K 4.2 08/05/2021    K 3.9 08/01/2021    CL 95 08/05/2021    CO2 24 08/05/2021    BUN 53 08/05/2021    LABALBU 4.1 08/01/2021    CREATININE 6.37 08/05/2021    CALCIUM 9.6 08/05/2021    GFRAA 8.0 08/05/2021    LABGLOM 6.6 08/05/2021    GLUCOSE 65 08/05/2021    GLUCOSE 419 07/30/2021     Magnesium:    Lab Results   Component Value Date    MG 2.1 02/24/2021     Troponin:    Lab Results   Component Value Date    TROPONINI 0.021 08/02/2021        Active Hospital Problems    Diagnosis Date Noted    Angina at rest [facility]) [I20.8] 05/24/2020     Priority: Low        Assessment/Plan:  Angina class IV - stable. History of CAD status post PCI of the mid LAD in-stent restenosis in May 2020 --- f/u cath this admission shows severe LAD ISR. It already has double layer of DEWEY. Vessel caliber is not large. LVEF 60%    End-stage renal disease hemodialysis dependent    Essential hypertension stable    Hyperlipidemia - statin    Awaiting bed at [facility] for transfer.           Electronically signed by Kira Moran MD on 8/6/2021 at 7:39 AM

## 2021-08-06 NOTE — PROGRESS NOTES
Nephrology Progress Note    Assessment:  ESRDX   OHDx CAD multiple stents  Depression schizophrenia  DM type-2      Plan:transfer to [facility]  Dialysis till then    Patient Active Problem List:     Atherosclerotic heart disease of native coronary artery with unspecified angina pectoris (HCC)     Schizophrenia, paranoid, chronic     Metabolic syndrome     Vitamin B 12 deficiency     Cerebral microvascular disease     Mixed hyperlipidemia     Other hammer toe (acquired)     Vitamin D insufficiency     Incontinence of urine     Diabetic nephropathy with proteinuria ([address]     Essential (primary) hypertension     History of type C viral hepatitis     Urinary incontinence due to cognitive impairment     History of seizures     Stented coronary artery-plan is to stay on Plavix indefinately per Dr Kadi Stinson     Other specified diabetes mellitus with diabetic neuropathy, unspecified ([address]     Controlled type 2 diabetes mellitus with diabetic neuropathy, with long-term current use of insulin (HCC)     Hemiparesis, left (HCC)     Angina, class II ([address]     Pain, unspecified     Tardive dyskinesia     Shortness of breath     Uncontrolled type 2 diabetes mellitus with hyperglycemia (HCC)     Chronic diastolic congestive heart failure (HCC)     Sleep apnea, unspecified     Pulmonary hypertension, unspecified (HCC)     Class 2 severe obesity with serious comorbidity and body mass index (BMI) of 36.0 to 36.9 in adult [facility])     Edema     Closed supracondylar fracture of right humerus     Other chronic pain     Palliative care patient     Angina at rest [facility])     Recurrent falls     Renal failure     Difficulty in walking     ESRD (end stage renal disease) on dialysis ([address]     Weakness     Other seizures (HCC)     Moderate persistent asthma without complication     Thrush     COVID-19     Post PTCA     Falls frequently     Contusion of right chest wall     Chest wall contusion, left, initial encounter     Headache, unspecified     Encounter for immunization     Paranoid schizophrenia ([facility]     Compression fracture of spine ([facility]     Closed rib fracture     Depression     Chronic obstructive pulmonary disease (HCC)     Critical illness polyneuropathy (HCC)     Multiple closed fractures of ribs of right side     Nonrheumatic mitral valve regurgitation     Nonrheumatic tricuspid valve regurgitation     Need for extended care facility     Chronic pain of both shoulders     Melena      Subjective:  Admit Date: 8/1/2021    Interval History: no issues    Medications:  Scheduled Meds:   sodium chloride flush  5-40 mL Intravenous 2 times per day    heparin (porcine)  5,000 Units Subcutaneous 3 times per day    lidocaine  1 patch Transdermal Daily    sodium chloride flush  5-40 mL Intravenous 2 times per day    heparin (porcine)  2,000 Units Intravenous Once    heparin (porcine)  1,000 Units Intravenous Once    sertraline  50 mg Oral Daily    metoprolol tartrate  25 mg Oral BID    aspirin  81 mg Oral Daily    insulin glargine  40 Units Subcutaneous Nightly    atorvastatin  40 mg Oral Nightly    amLODIPine  10 mg Oral Daily    ticagrelor  90 mg Oral BID    isosorbide mononitrate  60 mg Oral Daily    insulin glargine  55 Units Subcutaneous Nightly    magnesium oxide  400 mg Oral Daily    melatonin  10 mg Oral Nightly    pantoprazole  20 mg Oral Daily    ranolazine  500 mg Oral BID    ARIPiprazole  5 mg Oral Daily    insulin lispro  0-6 Units Subcutaneous TID WC    insulin lispro  0-3 Units Subcutaneous Nightly     Continuous Infusions:   sodium chloride 75 mL/hr at 08/04/21 0810    sodium chloride      sodium chloride      dextrose         CBC: No results for input(s): WBC, HGB, PLT in the last 72 hours.   CMP:    Recent Labs     08/04/21  0715 08/05/21  0621    136   K 4.3 4.2   CL 94* 95   CO2 26 24   BUN 32* 53*   CREATININE 4.63* 6.37*   GLUCOSE 130* 65*   CALCIUM 9.7 9.6   LABGLOM 9.5* 6.6*     Troponin: No results for input(s): TROPONINI in the last 72 hours. BNP: No results for input(s): BNP in the last 72 hours. INR: No results for input(s): INR in the last 72 hours. Lipids: No results for input(s): CHOL, LDLDIRECT, TRIG, HDL, AMYLASE, LIPASE in the last 72 hours. Liver: No results for input(s): AST, ALT, ALKPHOS, PROT, LABALBU, BILITOT in the last 72 hours. Invalid input(s): BILDIR  Iron:  No results for input(s): IRONS, FERRITIN in the last 72 hours. Invalid input(s): LABIRONS  Urinalysis: No results for input(s): UA in the last 72 hours.     Objective:  Vitals: BP (!) 107/58   Pulse 62   Temp 97.7 °F (36.5 °C) (Oral)   Resp 16   Ht 5' 7\" (1.702 m)   Wt 211 lb (95.7 kg)   LMP  (LMP Unknown)   SpO2 99%   BMI 33.05 kg/m²    Wt Readings from Last 3 Encounters:   08/06/21 211 lb (95.7 kg)   07/07/21 219 lb 6.4 oz (99.5 kg)   06/28/21 219 lb 3.2 oz (99.4 kg)      24HR INTAKE/OUTPUT:      Intake/Output Summary (Last 24 hours) at 8/6/2021 0817  Last data filed at 8/6/2021 0554  Gross per 24 hour   Intake 240 ml   Output --   Net 240 ml       General: alert, in no apparent distress  HEENT: normocephalic, atraumatic, anicteric  Neck: supple, no mass  Lungs: non-labored respirations, clear to auscultation bilaterally  Heart: regular rate and rhythm, no murmurs or rubs  Abdomen: soft, non-tender, non-distended  Ext: no cyanosis, no peripheral edema  Neuro: alert and oriented, no gross abnormalities  Psych: normal mood and affect  Skin: no rash      Electronically signed by Alexandria Sneed DO, MD

## 2021-08-06 NOTE — CARE COORDINATION
[address] [place] [address]. NELIDA RECEIVED CALL FROM TRANSFER CENTER, STILL NO BED AVAILABLE AT [facility] YET. DR ALARCON AWARE.

## 2021-08-07 VITALS
WEIGHT: 212 LBS | HEIGHT: 67 IN | RESPIRATION RATE: 20 BRPM | BODY MASS INDEX: 33.27 KG/M2 | DIASTOLIC BLOOD PRESSURE: 48 MMHG | TEMPERATURE: 97.9 F | HEART RATE: 71 BPM | SYSTOLIC BLOOD PRESSURE: 139 MMHG | OXYGEN SATURATION: 97 %

## 2021-08-07 LAB
GLUCOSE BLD-MCNC: 220 MG/DL (ref 60–115)
PERFORMED ON: ABNORMAL

## 2021-08-07 PROCEDURE — 6370000000 HC RX 637 (ALT 250 FOR IP): Performed by: INTERNAL MEDICINE

## 2021-08-07 PROCEDURE — 2580000003 HC RX 258: Performed by: PHYSICIAN ASSISTANT

## 2021-08-07 PROCEDURE — 99232 SBSQ HOSP IP/OBS MODERATE 35: CPT | Performed by: INTERNAL MEDICINE

## 2021-08-07 PROCEDURE — 6360000002 HC RX W HCPCS: Performed by: PHYSICIAN ASSISTANT

## 2021-08-07 RX ADMIN — AMLODIPINE BESYLATE 10 MG: 10 TABLET ORAL at 07:39

## 2021-08-07 RX ADMIN — TICAGRELOR 90 MG: 90 TABLET ORAL at 07:38

## 2021-08-07 RX ADMIN — ISOSORBIDE MONONITRATE 60 MG: 60 TABLET, EXTENDED RELEASE ORAL at 07:38

## 2021-08-07 RX ADMIN — PANTOPRAZOLE SODIUM 20 MG: 20 TABLET, DELAYED RELEASE ORAL at 07:38

## 2021-08-07 RX ADMIN — SERTRALINE HYDROCHLORIDE 50 MG: 25 TABLET ORAL at 07:39

## 2021-08-07 RX ADMIN — ARIPIPRAZOLE 5 MG: 5 TABLET ORAL at 07:38

## 2021-08-07 RX ADMIN — ASPIRIN 81 MG: 81 TABLET, CHEWABLE ORAL at 07:38

## 2021-08-07 RX ADMIN — HEPARIN SODIUM 5000 UNITS: 5000 INJECTION INTRAVENOUS; SUBCUTANEOUS at 05:07

## 2021-08-07 RX ADMIN — RANOLAZINE 500 MG: 500 TABLET, FILM COATED, EXTENDED RELEASE ORAL at 07:38

## 2021-08-07 RX ADMIN — METOPROLOL TARTRATE 25 MG: 25 TABLET, FILM COATED ORAL at 07:38

## 2021-08-07 RX ADMIN — Medication 10 ML: at 07:39

## 2021-08-07 RX ADMIN — Medication 400 MG: at 07:38

## 2021-08-07 ASSESSMENT — ENCOUNTER SYMPTOMS
STRIDOR: 0
EYES NEGATIVE: 1
NAUSEA: 0
COUGH: 0
RESPIRATORY NEGATIVE: 1
BLOOD IN STOOL: 0
GASTROINTESTINAL NEGATIVE: 1
CHEST TIGHTNESS: 0
WHEEZING: 0
SHORTNESS OF BREATH: 0

## 2021-08-07 ASSESSMENT — PAIN SCALES - GENERAL: PAINLEVEL_OUTOF10: 0

## 2021-08-07 NOTE — FLOWSHEET NOTE
Called report to accepting nurse Yazan Mcnamara in [facility] main. 2215- ambulance care called, unable to transport pt tonight that requires cardiac monitoring. Trying to reach [facility] for alternative transport. [facility] is aware. To give this staff a return call for updates. Pt updated. [facility] called back, unable to set up transport with anyone at this point. [facility] trying to reach [facility] transport center for possible set up on their side. Awaiting callback. 2300- per [facility], bed is on hold. The earliest possible transport is after 8am tomorrow.    Pick-up tomorrow between 8- 8:30am.

## 2021-08-07 NOTE — PROGRESS NOTES
Progress Note  Patient: Mecca Glaser  Unit/Bed: U579/T635-50  YOB: 1960  MRN: [id number]  Acct: [de-identified]   Admitting Diagnosis: Chest pain [R07.9]  Admit Date:  8/1/2021  Hospital Day: 5    Chief Complaint: CAD    Histories:  Past Medical History:   Diagnosis Date    Anxiety     CAD S/P percutaneous coronary angioplasty 2015, 2018    stents per dr Ross Delgado    CHF (congestive heart failure) ([address]     CKD (chronic kidney disease) stage 4, GFR 15-29 ml/min ([address] 2/24/2018    CKD stage 4 due to type 2 diabetes mellitus ([address]     COPD (chronic obstructive pulmonary disease) ([address]     Diabetic nephropathy with proteinuria ([address] 2014    DJD (degenerative joint disease) of knee     Dr Conrado Cam GERD (gastroesophageal reflux disease)     Hemiparesis, left ([address] 2013    [facility] Ass[facility] Living ([facility])    Hemodialysis patient [facility])     History of heart failure     History of seizures     History of type C viral hepatitis     HTN (hypertension)     Hyperlipidemia     Impaired mobility and activities of daily living     Mediastinal lymphadenopathy 2013    Marita Meraz    Metabolic syndrome     Moderate persistent asthma without complication 2/35/9016    Need for extended care facility 7/7/2021    Neurogenic urinary incontinence 2013    Neuropathy in diabetes [facility])     Nonrheumatic mitral valve regurgitation 7/7/2021    Nonrheumatic tricuspid valve regurgitation 7/7/2021    Obesity (BMI 30-39. 9)     Recurrent UTI     S/P colonoscopy 2014    [facility], focal active colitis    Schizophrenia, paranoid, chronic ([address]     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 Marcia Montiel      x1 Dr. Barber Echols, Dr Jeffery Hinkle CATH LAB PROCEDURE  10/02/2019    HYSTERECTOMY, TOTAL ABDOMINAL      one ovary intact, Dr Carmen Bond, menorrhagia    [place] TOTAL KNEE ARTHROPLASTY Left 2018    LEFT KNEE TOTAL KNEE ARTHROPLASTY, SHAYNA, NERVE BLOCK performed by Светлана Noriega MD at [address] [place] [address] Right     TOTAL KNEE ARTHROPLASTY  16    Dr Connie Ornelas TUNNELED [address] Right 2020    tunneled HD catheter per Dr Nolvia Reddy     Family History   Problem Relation Age of Onset    Cancer Mother 76        survived   Marsha Her Hypertension Father     Diabetes Sister     Mental Illness Sister      Social History     Socioeconomic History    Marital status:      Spouse name: None    Number of children: 2    Years of education: None    Highest education level: None   Occupational History    Occupation: disabled   Tobacco Use    Smoking status: Passive Smoke Exposure - Never Smoker    Smokeless tobacco: Never Used   Vaping Use    Vaping Use: Never used   Substance and Sexual Activity    Alcohol use: No     Alcohol/week: 0.0 standard drinks    Drug use: No    Sexual activity: Not Currently   Other Topics Concern    None   Social History Narrative    Born in [place], one of 5    Twin sister Reyes, very ill in , 2019    Moved to [place], , 2 children, one son and one daughter    Worked at [employer], as a nurse's aide    Disabled due to mental illness    Lived at [employer], was discharged, returned to independent living in 2017 in the daughter's house and has adjusted well    One son and one daughter, live in the same house with patient, Vince Vásquez pays the rent    Hobbies reading (misteries)     Social Determinants of Health     Financial Resource Strain: Low Risk     Difficulty of Paying Living Expenses: Not hard at all   Food Insecurity: No Food Insecurity    Worried About Running Out of Food in the Last Year: Never true    Yung of Food in the Last Year: Never true   Transportation Needs: No Transportation Needs    Lack of Transportation (Medical): No    Lack of Transportation (Non-Medical): No   Physical Activity:     Days of Exercise per Week:     Minutes of Exercise per Session:    Stress:     Feeling of Stress :    Social Connections:     Frequency of Communication with Friends and Family:     Frequency of Social Gatherings with Friends and Family:     Attends Hindu Services:     Active Member of Clubs or Organizations:     Attends Club or Organization Meetings:     Marital Status:    Intimate Partner Violence:     Fear of Current or Ex-Partner:     Emotionally Abused:     Physically Abused:     Sexually Abused:        Subjective/HPI no cp no sob no acute events. Walking in room. EKG: SR 66        Review of Systems:   Review of Systems   Constitutional: Negative. Negative for diaphoresis and fatigue. HENT: Negative. Eyes: Negative. Respiratory: Negative. Negative for cough, chest tightness, shortness of breath, wheezing and stridor. Cardiovascular: Negative. Negative for chest pain, palpitations and leg swelling. Gastrointestinal: Negative. Negative for blood in stool and nausea. Genitourinary: Negative. Musculoskeletal: Negative. Skin: Negative. Neurological: Negative. Negative for dizziness, syncope, weakness and light-headedness. Hematological: Negative. Psychiatric/Behavioral: Negative.           Physical Examination:    BP (!) 139/48   Pulse 71   Temp 97.9 °F (36.6 °C) (Oral)   Resp 20   Ht 5' 7\" (1.702 m)   Wt 212 lb (96.2 kg)   LMP  (LMP Unknown)   SpO2 97%   BMI 33.20 kg/m²    Physical Exam   Constitutional: She appears healthy. No distress. HENT:   Normal cephalic and Atraumatic   Eyes: Pupils are equal, round, and reactive to light. Neck: Thyroid normal. No JVD present. No neck adenopathy. No thyromegaly present. Cardiovascular: Normal rate, regular rhythm, normal heart sounds, intact distal pulses and normal pulses. Pulmonary/Chest: Effort normal and breath sounds normal. She has no wheezes. She has no rales. She exhibits no tenderness. Abdominal: Soft. Bowel sounds are normal. There is no abdominal tenderness. Musculoskeletal:         General: No tenderness or edema. Normal range of motion. Cervical back: Normal range of motion and neck supple. Neurological: She is alert and oriented to person, place, and time. Skin: Skin is warm. No cyanosis. Nails show no clubbing.        LABS:  CBC:   Lab Results   Component Value Date    WBC 7.9 08/03/2021    RBC 3.43 08/03/2021    HGB 11.2 08/03/2021    HCT 32.4 08/03/2021    MCV 94.4 08/03/2021    MCH 32.8 08/03/2021    MCHC 34.7 08/03/2021    RDW 18.0 08/03/2021     08/03/2021    MPV 10.0 03/05/2020     CBC with Differential:    Lab Results   Component Value Date    WBC 7.9 08/03/2021    RBC 3.43 08/03/2021    HGB 11.2 08/03/2021    HCT 32.4 08/03/2021     08/03/2021    MCV 94.4 08/03/2021    MCH 32.8 08/03/2021    MCHC 34.7 08/03/2021    RDW 18.0 08/03/2021    NRBC 0.0 07/30/2021    BANDSPCT 5 06/14/2013    LYMPHOPCT 20.2 08/01/2021    MONOPCT 9.6 08/01/2021    EOSPCT 3.5 03/05/2020    BASOPCT 0.4 08/01/2021    MONOSABS 0.9 08/01/2021    LYMPHSABS 1.9 08/01/2021    EOSABS 0.6 08/01/2021    BASOSABS 0.0 08/01/2021     CMP:    Lab Results   Component Value Date     08/05/2021    K 4.2 08/05/2021    K 3.9 08/01/2021    CL 95 08/05/2021    CO2 24 08/05/2021    BUN 53 08/05/2021    CREATININE 6.37 08/05/2021    GFRAA 8.0 08/05/2021    LABGLOM 6.6 08/05/2021    GLUCOSE 65 08/05/2021    GLUCOSE 419 07/30/2021    PROT 6.9 08/01/2021    LABALBU 4.1 08/01/2021    CALCIUM 9.6 08/05/2021    BILITOT 0.5 08/01/2021    ALKPHOS 98 08/01/2021    AST 20 08/01/2021    ALT 15 08/01/2021     BMP:    Lab Results   Component Value Date     08/05/2021    K 4.2 08/05/2021    K 3.9 08/01/2021    CL 95 08/05/2021    CO2 24 08/05/2021    BUN 53 08/05/2021    LABALBU 4.1 08/01/2021    CREATININE 6.37 08/05/2021    CALCIUM 9.6 08/05/2021    GFRAA 8.0 08/05/2021    LABGLOM 6.6 08/05/2021    GLUCOSE 65 08/05/2021    GLUCOSE 419 07/30/2021     Magnesium:    Lab Results   Component Value Date    MG 2.1 02/24/2021     Troponin:    Lab Results   Component Value Date    TROPONINI 0.021 08/02/2021        Active Hospital Problems    Diagnosis Date Noted    Angina at rest [facility]) [I20.8] 05/24/2020     Priority: Low        Assessment/Plan:  Angina class IV - stable with no ongoing angina. History of CAD status post PCI of the mid LAD in-stent restenosis in May 2020 --- f/u cath this admission shows severe LAD ISR. It already has double layer of DEWEY. Vessel caliber is not large. LVEF 60%    End-stage renal disease hemodialysis dependent    Essential hypertension stable    Hyperlipidemia - statin    Awaiting bed at [facility] for transfer.           Electronically signed by Alex Adrian MD on 8/7/2021 at 9:02 AM

## 2021-08-09 NOTE — CARE COORDINATION
[facility] Update     2021    Patient: John Garcia Patient : 1960   MRN: [id number]  Reason for Admission: 2021 - 2021 Anginal symptoms and underwent cardiac catheterization showing mid to distal LAD in-stent restenosis  Discharge Date: 21 RARS: Readmission Risk Score: 41  CT       Care Transitions Post Acute Facility Update    Care Transitions Interventions  Post Acute Facility: [facility]  Post Acute Facility Update

## 2021-08-14 NOTE — PROGRESS NOTES
Patient Name: Caitlin Car  YOB: 1960  Medical Record Number: [id number]      History of Present Illness:  Patient seen with Sonny Yanes is a 43-year-old woman who was admitted here after recent failure to thrive at home patient had been directed by her primary care physician for evaluation of possible long-term placement. Patient had previously been hospitalized under course of physical therapy therapy transferred to skilled rehab where she had undergone course of therapy and return home at her insistence patient had declined well on arrival and has been unable to maintain successfully there had seen her primary care physician who had agreed the patient would be beneficial for course of further therapy possible placement? Patient's transfer here today pain-free not doing well cognitively back to baseline but globally weak. Review of Systems   Constitutional: Positive for fatigue. Negative for fever. HENT: Negative for congestion. Respiratory: Negative for cough and shortness of breath. Cardiovascular: Positive for leg swelling. Negative for chest pain. Gastrointestinal: Positive for constipation. Negative for abdominal distention. Musculoskeletal: Negative for arthralgias and back pain. Neurological: Positive for weakness. Psychiatric/Behavioral: Negative for agitation.        Review of Systems: All 14 review of systems negative other than as stated above    Social History     Tobacco Use    Smoking status: Passive Smoke Exposure - Never Smoker    Smokeless tobacco: Never Used   Vaping Use    Vaping Use: Never used   Substance Use Topics    Alcohol use: No     Alcohol/week: 0.0 standard drinks    Drug use: No         Past Medical History:   Diagnosis Date    Anxiety     CAD S/P percutaneous coronary angioplasty 2015, 2018    stents per dr Ifrah Rodas    [facility]     CKD (chronic kidney disease) stage 4, GFR 15-29 ml/min ([facility] 2018    CKD stage 4 due to type 2 diabetes mellitus ([address]     COPD (chronic obstructive pulmonary disease) (HCC)     Diabetic nephropathy with proteinuria ([address]     DJD (degenerative joint disease) of knee     Dr Marisol Haley GERD (gastroesophageal reflux disease)     Hemiparesis, left ([address]     entered Assisted Living ([place])    Hemodialysis patient [facility])     History of heart failure     History of seizures     History of type C viral hepatitis     HTN (hypertension)     Hyperlipidemia     Impaired mobility and activities of daily living     Mediastinal lymphadenopathy     Gianna Burks    Metabolic syndrome     Moderate persistent asthma without complication 3/34/6753    Need for extended care facility 2021    Neurogenic urinary incontinence 2013    Neuropathy in diabetes [facility])     Nonrheumatic mitral valve regurgitation 2021    Nonrheumatic tricuspid valve regurgitation 2021    Obesity (BMI 30-39. 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 Yuriy Álvarez      x1 Dr. Maico Polk, Dr Kareem Lawson CATH LAB PROCEDURE  10/02/2019    HYSTERECTOMY, TOTAL ABDOMINAL      one ovary intact, Dr Dee Dee Elise, menorrhagia    [place] TOTAL KNEE ARTHROPLASTY Left 2018    LEFT KNEE TOTAL KNEE ARTHROPLASTY, SHAYNA, NERVE BLOCK performed by Madison Prado MD at [address] [place] [address] Right     TOTAL KNEE ARTHROPLASTY  05/19/16    Dr Eriberto Allen [address]ELED [address] Right 07/01/2020    tunneled HD catheter per Dr Lorraine Dorsey Medications on File Prior to Visit   Medication Sig Dispense Refill    LANTUS SOLOSTAR 100 UNIT/ML injection pen 55 units at bedtime 10 pen 10    insulin aspart (NOVOLOG FLEXPEN) 100 UNIT/ML injection pen INJECT 8-10  units with each meals (Patient taking differently: Inject 10 Units into the skin 3 times daily (before meals) And per sliding scale fo 0-149=0 units; 150-199= 2units; 200-249= 4 units; 250-299=6units; 300-349=8 units; 350-400=10 units; 401-700= Recheck and call MD/CNP) 10 pen 3    melatonin 10 MG CAPS capsule Take 1 capsule by mouth nightly 30 capsule 12    amLODIPine (NORVASC) 10 MG tablet TAKE 1 TABLET BY MOUTH DAILY 30 tablet 3    aspirin EC 81 MG EC tablet Take 1 tablet by mouth daily 30 tablet 12    metoprolol tartrate (LOPRESSOR) 25 MG tablet Take 0.5 tablets by mouth 2 times daily 90 tablet 0    ARIPiprazole (ABILIFY) 5 MG tablet TAKE 1 TABLET BY MOUTH ONCE DAILY 30 tablet 2    magnesium oxide (MAG-OX) 400 MG tablet Take 1 tablet by mouth daily 90 tablet 4    atorvastatin (LIPITOR) 40 MG tablet Take 1 tablet by mouth daily (Patient taking differently: Take 40 mg by mouth nightly ) 90 tablet 4    Insulin Pen Needle (SURE COMFORT PEN NEEDLES) 30G X 8 MM MISC USE AS DIRECTED FIVE TIMES A DAILY 100 each 10    blood glucose test strips (FREESTYLE LITE) strip 1 each by Does not apply route 4 times daily (before meals and nightly) As needed.  200 strip 5    Alcohol Swabs (EASY TOUCH ALCOHOL PREP MEDIUM) 70 % PADS USE AS DIRECTED THREE TIMES A  each 3    FreeStyle Lancets MISC Test 4x daily 150 each 3    docusate sodium (COLACE, DULCOLAX) 100 MG CAPS Take 100 mg by mouth 2 times daily For the prevention and treatment of constipation while taking pain medications. 30 capsule 0    acetaminophen (TYLENOL) 325 MG tablet Take 2 tablets by mouth every 4 hours as needed for Pain (For mild to moderate pain (Pain 1-6 out of 10 on pain scale)) 120 tablet 0    triamcinolone (KENALOG) 0.1 % cream APPLY TO AFFECTED AREAS TWICE DAILY AS DIRECTED 80 g 10    nystatin (NYAMYC) 554124 UNIT/GM powder APPLY TO ABDOMINAL FOLDS EVERY 12 HOURS AS NEEDED 60 g 10    vitamin B-12 (CYANOCOBALAMIN) 100 MCG tablet Take 1 tablet by mouth daily (Patient taking differently: Take 100 mcg by mouth daily At night) 30 tablet 10    Misc. Devices (BARIATRIC ROLLATOR) MISC Rolllator with a basket 1 each 0    Blood Glucose Monitoring Suppl (FREESTYLE LITE) CORETTA 1 Device by Does not apply route daily as needed (Diabetes) Use freestyle meter to test blood sugar as needed 1 Device 0    ranolazine (RANEXA) 500 MG extended release tablet Take 1 tablet by mouth 2 times daily 180 tablet 2    BRILINTA 90 MG TABS tablet TAKE 1 TABLET BY MOUTH 2 TIMES DAILY 60 tablet 11    pantoprazole (PROTONIX) 20 MG tablet TAKE 1 TABLET BY MOUTH DAILY 90 tablet 3    B Complex-C-Folic Acid (NEPHRO VITAMINS) 0.8 MG TABS Take 1 tablet by mouth daily       albuterol sulfate HFA (VENTOLIN HFA) 108 (90 Base) MCG/ACT inhaler Inhale 2 puffs into the lungs every 6 hours as needed for Wheezing      sertraline (ZOLOFT) 50 MG tablet TAKE 1 TABLET BY MOUTH DAILY (Patient taking differently: Take 50 mg by mouth nightly TAKE 1 TABLET BY MOUTH DAILY) 90 tablet 3    fluticasone (FLOVENT HFA) 110 MCG/ACT inhaler Inhale 2 puffs into the lungs 2 times daily 1 Inhaler 12    Misc. Devices [facility]) MISC To use with transport outside of the house.  1 each 0    isosorbide mononitrate (IMDUR) 60 MG extended release tablet Take 1 tablet by mouth daily 90 tablet 3    nitroGLYCERIN (NITROSTAT) 0.4 MG SL tablet Place 1 tablet under the tongue every 5 minutes as needed for Chest pain       No current facility-administered medications on file prior to visit. Allergies   Allergen Reactions    Codeine Hives     hives    Oxycontin [Oxycodone Hcl] Hives         Family History   Problem Relation Age of Onset   Kelli Diggs Cancer Mother 76        survived   Kelli Diggs Hypertension Father     Diabetes Sister     Mental Illness Sister          Physical Exam:      Physical Exam  Vitals and nursing note reviewed. Constitutional:       Appearance: Normal appearance. She is obese. HENT:      Head: Atraumatic. Nose: Nose normal.      Mouth/Throat:      Mouth: Mucous membranes are dry. Eyes:      Extraocular Movements: Extraocular movements intact. Cardiovascular:      Rate and Rhythm: Regular rhythm. Pulses: Normal pulses. Heart sounds: Normal heart sounds. Pulmonary:      Effort: Pulmonary effort is normal.      Breath sounds: Normal breath sounds. No wheezing. Abdominal:      General: Bowel sounds are normal.      Palpations: Abdomen is soft. There is no mass. Musculoskeletal:         General: Swelling present. Normal range of motion. Cervical back: Neck supple. No tenderness. Skin:     General: Skin is warm. Findings: No rash. Neurological:      Mental Status: Mental status is at baseline. Psychiatric:         Mood and Affect: Mood normal.         not currently breastfeeding.       .   Lab Results   Component Value Date    WBC 7.9 08/03/2021    HGB 11.2 (L) 08/03/2021    HCT 32.4 (L) 08/03/2021    MCV 94.4 08/03/2021     08/03/2021     Lab Results   Component Value Date     08/05/2021    K 4.2 08/05/2021    K 3.9 08/01/2021    CL 95 08/05/2021    CO2 24 08/05/2021    BUN 53 08/05/2021    CREATININE 6.37 08/05/2021    GLUCOSE 65 08/05/2021    GLUCOSE 419 07/30/2021    CALCIUM 9.6 08/05/2021                ASSESSMENT:  Patient Active Problem List   Diagnosis    Atherosclerotic heart disease of native coronary artery with unspecified angina pectoris ([address]  Schizophrenia, paranoid, chronic    Metabolic syndrome    Vitamin B 12 deficiency    Cerebral microvascular disease    Mixed hyperlipidemia    Other hammer toe (acquired)    Vitamin D insufficiency    Incontinence of urine    Diabetic nephropathy with proteinuria (HCC)    Essential (primary) hypertension    History of type C viral hepatitis    Urinary incontinence due to cognitive impairment    History of seizures    Stented coronary artery-plan is to stay on Plavix indefinately per Dr Iglesia French Other specified diabetes mellitus with diabetic neuropathy, unspecified ([address]    Controlled type 2 diabetes mellitus with diabetic neuropathy, with long-term current use of insulin ([facility])    Hemiparesis, left ([facility])    Angina, class II ([address]    Pain, unspecified    Tardive dyskinesia    Shortness of breath    Uncontrolled type 2 diabetes mellitus with hyperglycemia ([facility])    Chronic diastolic congestive heart failure ([facility])    Sleep apnea, unspecified    Pulmonary hypertension, unspecified ([facility])    Class 2 severe obesity with serious comorbidity and body mass index (BMI) of 36.0 to 36.9 in adult (HCC)    Edema    Closed supracondylar fracture of right humerus    Other chronic pain    Palliative care patient    Angina at rest [facility])    Recurrent falls    Renal failure    Difficulty in walking    ESRD (end stage renal disease) on dialysis (HCC)    Weakness    Other seizures (HCC)    Moderate persistent asthma without complication    Thrush    COVID-19    Post PTCA    Falls frequently    Contusion of right chest wall    Chest wall contusion, left, initial encounter    Headache, unspecified    Encounter for immunization    Paranoid schizophrenia ([address]    Compression fracture of spine ([address]    Closed rib fracture    Depression    Chronic obstructive pulmonary disease (HCC)    Critical illness polyneuropathy ([address]    Multiple closed fractures of ribs of right side    Nonrheumatic mitral valve regurgitation    Nonrheumatic tricuspid valve regurgitation    Need for extended care facility    Chronic pain of both shoulders    Melena         PLAN:   Diagnosis Orders   1. Chronic bronchitis, unspecified chronic bronchitis type ([facility]     2. ESRD (end stage renal disease) on dialysis (HCC)     3. Weakness     4. Seizure disorder [facility])         Course physical therapy advised therapy focus on balance training goal of monitoring functional status. Patient has no doubts the past monitor renal function this time. Course of antiplatelet agents moderate risk for status goal maximize functional status return to the community if possible given recent falls and failures we will follow him clinically.   BMP A1c lipid panel pending

## 2021-08-16 ENCOUNTER — OFFICE VISIT (OUTPATIENT)
Dept: GERIATRIC MEDICINE | Age: 61
End: 2021-08-16
Payer: MEDICARE

## 2021-08-16 DIAGNOSIS — F20.0 PARANOID SCHIZOPHRENIA (HCC): ICD-10-CM

## 2021-08-16 DIAGNOSIS — I10 ESSENTIAL HYPERTENSION: Primary | ICD-10-CM

## 2021-08-16 DIAGNOSIS — N18.6 ESRD (END STAGE RENAL DISEASE) ON DIALYSIS (HCC): ICD-10-CM

## 2021-08-16 DIAGNOSIS — I20.8 ANGINA AT REST (HCC): ICD-10-CM

## 2021-08-16 DIAGNOSIS — Z99.2 ESRD (END STAGE RENAL DISEASE) ON DIALYSIS (HCC): ICD-10-CM

## 2021-08-16 PROCEDURE — 99305 1ST NF CARE MODERATE MDM 35: CPT | Performed by: INTERNAL MEDICINE

## 2021-08-17 RX ORDER — MIDODRINE HYDROCHLORIDE 5 MG/1
5 TABLET ORAL
COMMUNITY
End: 2021-12-16 | Stop reason: DRUGHIGH

## 2021-08-18 LAB
ANION GAP SERPL CALCULATED.3IONS-SCNC: 18 MEQ/L (ref 9–15)
BUN BLDV-MCNC: 28 MG/DL (ref 8–23)
CALCIUM SERPL-MCNC: 9.7 MG/DL (ref 8.5–9.9)
CHLORIDE BLD-SCNC: 89 MEQ/L (ref 95–107)
CO2: 29 MEQ/L (ref 20–31)
CREAT SERPL-MCNC: 4.51 MG/DL (ref 0.5–0.9)
GFR AFRICAN AMERICAN: 11.9
GFR NON-AFRICAN AMERICAN: 9.8
GLUCOSE BLD-MCNC: 196 MG/DL (ref 70–99)
HBA1C MFR BLD: 6.2 % (ref 4.8–5.9)
HCT VFR BLD CALC: 30 % (ref 37–47)
HEMOGLOBIN: 10.6 G/DL (ref 12–16)
MCH RBC QN AUTO: 32.6 PG (ref 27–31.3)
MCHC RBC AUTO-ENTMCNC: 35.2 % (ref 33–37)
MCV RBC AUTO: 92.6 FL (ref 82–100)
PDW BLD-RTO: 16.8 % (ref 11.5–14.5)
PLATELET # BLD: 190 K/UL (ref 130–400)
POTASSIUM SERPL-SCNC: 3.4 MEQ/L (ref 3.4–4.9)
RBC # BLD: 3.24 M/UL (ref 4.2–5.4)
SODIUM BLD-SCNC: 136 MEQ/L (ref 135–144)
WBC # BLD: 7 K/UL (ref 4.8–10.8)

## 2021-08-30 NOTE — PROGRESS NOTES
SUBJECTIVE:  This 58-year-old woman is seen for follow-up visit for her shortness of breath the patient has been hemodialysis. Patient has not had any recent nausea vomiting or recent emesis fevers or chills. No change in her bowel bladder habits no bleeding diathesis. Patient has been globally weak has been undergoing course of therapy. ROS: Chest palpitations nausea vomiting emesis. The rest of the 14 point ROS negative    PHYSICAL EXAM: VSS per facility record  Normocephalic atraumatic lids are small with are reactive oral mucosa moist chest showed no crackles no wheezing cardiovascular showed a regular rate abdomen soft nontender extremity trace terrestrial pulse in place functioning    ASSESSMENT & PLAN:   Diagnosis Orders   1. Panlobular emphysema ([address]     2. ESRD (end stage renal disease) on dialysis ([address]     3. Weakness         Continue pressure treatments monitoring for evidence acute decline continue with support socks as needed. Continue to renal monitor renal function. Continue course of therapy attritional support.           Past Medical History:   Diagnosis Date    Anxiety     CAD S/P percutaneous coronary angioplasty 2015, 2018    stents per dr Taj Vásquez    CHF (congestive heart failure) ([address]     CKD (chronic kidney disease) stage 4, GFR 15-29 ml/min ([address] 2/24/2018    CKD stage 4 due to type 2 diabetes mellitus ([address]     COPD (chronic obstructive pulmonary disease) ([address]     Diabetic nephropathy with proteinuria ([address] 2014    DJD (degenerative joint disease) of knee     Dr Alejandro Neal GERD (gastroesophageal reflux disease)     Hemiparesis, left ([address] 2013    [facility]isted Living ([facility]    Hemodialysis patient [facility])     History of heart failure     History of seizures     History of type C viral hepatitis     HTN (hypertension)     Hyperlipidemia     Impaired mobility and activities of daily living     Mediastinal lymphadenopathy 2013    Dr Angeline Quiroz, [place]    Metabolic syndrome     Moderate persistent asthma without complication     Need for extended care facility 2021    Neurogenic urinary incontinence 2013    Neuropathy in diabetes [facility])     Nonrheumatic mitral valve regurgitation 2021    Nonrheumatic tricuspid valve regurgitation 2021    Obesity (BMI 30-39. 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 Arabella Prado      x1 Dr. Elliott Lepe, Dr Dimitri Del Valle CATH LAB PROCEDURE  10/02/2019    HYSTERECTOMY, TOTAL ABDOMINAL      one ovary intact, Dr Lorie Cabrera, menorrhagia    CO TOTAL KNEE ARTHROPLASTY Left 2018    LEFT KNEE TOTAL KNEE ARTHROPLASTY, SHAYNA, NERVE BLOCK performed by Margaux Christiansen MD at [address] [place] [address] Right     TOTAL KNEE ARTHROPLASTY  16    Dr Cheney Child TUNNELED [address] Right 2020    tunneled HD catheter per Dr Barbra Saenz Medications on File Prior to Visit   Medication Sig Dispense Refill    LANTUS SOLOSTAR 100 UNIT/ML injection pen 55 units at bedtime (Patient taking differently: Inject 40 Units into the skin nightly 40 units at bedtime) 10 pen 10    insulin aspart (NOVOLOG FLEXPEN) 100 UNIT/ML injection pen INJECT 8-10  units with each meals (Patient taking differently: Inject 0-8 Units into the skin 3 times daily (before meals) And per sliding scale fo 0-150=0 units; 151-200= 2units; 201-250= 4 units; 251-300=6units; 301-350=8 units; 351-400=1Call MD/  CNP) 10 pen 3    melatonin 10 MG CAPS capsule Take 1 capsule by mouth nightly 30 capsule 12    aspirin EC 81 MG EC tablet Take 1 tablet by mouth daily 30 tablet 12    metoprolol tartrate (LOPRESSOR) 25 MG tablet Take 0.5 tablets by mouth 2 times daily (Patient taking differently: Take 25 mg by mouth 2 times daily ) 90 tablet 0    ARIPiprazole (ABILIFY) 5 MG tablet TAKE 1 TABLET BY MOUTH ONCE DAILY 30 tablet 2    magnesium oxide (MAG-OX) 400 MG tablet Take 1 tablet by mouth daily 90 tablet 4    atorvastatin (LIPITOR) 40 MG tablet Take 1 tablet by mouth daily (Patient taking differently: Take 40 mg by mouth nightly ) 90 tablet 4    Insulin Pen Needle (SURE COMFORT PEN NEEDLES) 30G X 8 MM MISC USE AS DIRECTED FIVE TIMES A DAILY 100 each 10    blood glucose test strips (FREESTYLE LITE) strip 1 each by Does not apply route 4 times daily (before meals and nightly) As needed. 200 strip 5    Alcohol Swabs (EASY TOUCH ALCOHOL PREP MEDIUM) 70 % PADS USE AS DIRECTED THREE TIMES A  each 3    FreeStyle Lancets MISC Test 4x daily 150 each 3    docusate sodium (COLACE, DULCOLAX) 100 MG CAPS Take 100 mg by mouth 2 times daily For the prevention and treatment of constipation while taking pain medications. 30 capsule 0    acetaminophen (TYLENOL) 325 MG tablet Take 2 tablets by mouth every 4 hours as needed for Pain (For mild to moderate pain (Pain 1-6 out of 10 on pain scale)) 120 tablet 0    Misc.  Devices (BARIATRIC ROLLATOR) MISC Rolllator with a basket 1 each 0    Blood Glucose Monitoring Suppl (FREESTYLE LITE) CORETTA 1 Device by Does not apply route daily as needed (Diabetes) Use freestyle meter to test blood sugar as needed 1 Device 0    ranolazine (RANEXA) 500 MG extended release tablet Take 1 tablet by mouth 2 times daily 180 tablet 2    BRILINTA 90 MG TABS tablet TAKE 1 TABLET BY MOUTH 2 TIMES DAILY 60 tablet 11    pantoprazole (PROTONIX) 20 MG tablet TAKE 1 TABLET BY MOUTH DAILY 90 tablet 3    sertraline (ZOLOFT) 50 MG tablet TAKE 1 TABLET BY MOUTH DAILY (Patient taking differently: Take 50 mg by mouth nightly TAKE 1 TABLET BY MOUTH DAILY) 90 tablet 3    Misc. Devices [facility]) MISC To use with transport outside of the house. 1 each 0    isosorbide mononitrate (IMDUR) 60 MG extended release tablet Take 1 tablet by mouth daily (Patient taking differently: Take 120 mg by mouth daily Give 30mg tabs  x4 tabs) 90 tablet 3    nitroGLYCERIN (NITROSTAT) 0.4 MG SL tablet Place 1 tablet under the tongue every 5 minutes as needed for Chest pain       No current facility-administered medications on file prior to visit.          Family History   Problem Relation Age of Onset   [facility] Cancer Mother 76        survived   [facility] Hypertension Father     Diabetes Sister     Mental Illness Sister        Social History     Socioeconomic History    Marital status:      Spouse name: Not on file    Number of children: 2    Years of education: Not on file    Highest education level: Not on file   Occupational History    Occupation: disabled   Tobacco Use    Smoking status: Passive Smoke Exposure - Never Smoker    Smokeless tobacco: Never Used   Vaping Use    Vaping Use: Never used   Substance and Sexual Activity    Alcohol use: No     Alcohol/week: 0.0 standard drinks    Drug use: No    Sexual activity: Not Currently   Other Topics Concern    Not on file   Social History Narrative    Born in [place], one of 5    Twin sister Reyes, very ill in 2018, [place] 2019    Moved to [facility], , 2 children, one son and one daughter    Worked at [employer], as a nurse's aide    Disabled due to mental illness    Lived at [facility], was discharged, returned to independent living in 2017 in the daughter's house and has adjusted well    One son and one daughter, live in the same house with patient, Kal Pozo pays the rent    Hobbies reading (misteries)     Social Determinants of Health     Financial Resource Strain: Low Risk     Difficulty of Paying Living Expenses: Not hard at all   Food Insecurity: No Food Insecurity    Worried About Running Out of Food in the Last Year: Never true    Yung of Food in the Last Year: Never true   Transportation Needs: No Transportation Needs    Lack of Transportation (Medical): No    Lack of Transportation (Non-Medical):  No   Physical Activity:     Days of Exercise per Week:     Minutes of Exercise per Session:    Stress:     Feeling of Stress :    Social Connections:     Frequency of Communication with Friends and Family:     Frequency of Social Gatherings with Friends and Family:     Attends Congregational Services:     Active Member of Clubs or Organizations:     Attends Club or Organization Meetings:     Marital Status:    Intimate Partner Violence:     Fear of Current or Ex-Partner:     Emotionally Abused:     Physically Abused:     Sexually Abused:          Lab Results   Component Value Date    LABA1C 6.2 (H) 08/18/2021     Lab Results   Component Value Date     09/04/2020       Lab Results   Component Value Date     08/18/2021    K 3.4 08/18/2021    K 3.9 08/01/2021    CL 89 08/18/2021    CO2 29 08/18/2021    BUN 28 08/18/2021    CREATININE 4.51 08/18/2021    GLUCOSE 196 08/18/2021    GLUCOSE 419 07/30/2021    CALCIUM 9.7 08/18/2021        Lab Results   Component Value Date    CHOL 101 06/11/2020    CHOL 107 05/25/2020    CHOL 137 02/26/2019     Lab Results   Component Value Date    TRIG 82 06/11/2020    TRIG 85 05/25/2020    TRIG 116 02/26/2019     Lab Results   Component Value Date    HDL 48 06/11/2020    HDL 44 05/25/2020    HDL 55 02/26/2019     Lab Results   Component Value Date    LDLCALC 37 06/11/2020    LDLCALC 46 05/25/2020    [address] 02/26/2019     Lab Results   Component Value Date    LABVLDL 59.0 05/04/2013    VLDL 43 01/30/2017     Lab Results   Component Value Date    CHOLHDLRATIO 4.32 01/30/2017       Lab Results   Component Value Date    TSH 0.856 07/16/2021       Lab Results   Component Value Date    WBC 7.0 08/18/2021    HGB 10.6 (L) 08/18/2021    HCT 30.0 (L) 08/18/2021    MCV 92.6 08/18/2021     08/18/2021       Please note orders entered on site at facility after discussion with appropriate facility nursing/therapy/ / nutritional staff. Current longstanding medical problems and acute medical issues addressed with staff. Available data and data elements in on site paper chart reviewed and analyzed. Current external consultant notes reviewed in on site chart. Ordered laboratory testing and imaging will be reviewed when available.

## 2021-09-02 ENCOUNTER — CARE COORDINATION (OUTPATIENT)
Dept: CASE MANAGEMENT | Age: 61
End: 2021-09-02

## 2021-09-02 ENCOUNTER — TELEPHONE (OUTPATIENT)
Dept: FAMILY MEDICINE CLINIC | Age: 61
End: 2021-09-02

## 2021-09-02 NOTE — CARE COORDINATION
[facility] Transitions Initial Follow Up Call    Call within 2 business days of discharge: Yes    Patient: Mechelle Moraes Patient : 1960   MRN: [id number]  Reason for Admission: Chest Pain  Discharge Date: 21 RARS: Readmission Risk Score: 41      Last Discharge [facility]       Complaint Diagnosis Description Type Department Provider    21 Chest Pain Chest pain, unspecified type . .. ED to Hosp-Admission (Discharged) (ADMITTED) [address] [place] [address]; Ernesto Morris MD      Acute Care Course: Patient admitted to [facility] for c/o chest pain from -. She underwent a Cardiac Cath. It was based off results patient was transferred to [facility] for a second opinion and poss CABG. See encounter      Sig Hx: Angina    DME:     Conversation: Attempted to contact patient for a wellness check. Unable to reach patient. Caller left a confidential vm with contact information for a return call. Follow up plan:  Will attempt again    Non-face-to-face services provided:      Care Transitions 24 Hour Call    Patient DME: Jt engle  Do you have support at home?: Child, [facility]  Are you an active caregiver in your home?: No  Care Transitions Interventions         Follow Up  Future Appointments   Date Time Provider Aminata Cortes   2021 11:00 AM Candie Berrios MD [facility]   2021  2:00 PM Andrei Wei MD [address] [place] [address]   5/10/2022 12:00 PM Candie Berrios MD [address] [place] [address] Dr Flores Dach, [place]

## 2021-09-02 NOTE — TELEPHONE ENCOUNTER
Aaron Mahoney from [facility] called and said patient was discharged from nursing home she was on 100mg of sertraline there which was working fine for her. You had her on 50mg they are wondering what should patient be taking. If you want patient on 50mg they said she would need weaned down.

## 2021-09-03 ENCOUNTER — CARE COORDINATION (OUTPATIENT)
Dept: CASE MANAGEMENT | Age: 61
End: 2021-09-03

## 2021-09-03 NOTE — CARE COORDINATION
[facility] Transitions Initial Follow Up Call    Call within 2 business days of discharge: Yes    Patient: Theodora Ho Patient : 1960   MRN: [id number]  Reason for Admission: Chest Pain  Discharge Date: 21 RARS: Readmission Risk Score: 41      [facility] Discharge [facility]       Complaint Diagnosis Description Type Department Provider    21 Chest Pain Chest pain, unspecified type . .. ED to Hosp-Admission (Discharged) (ADMITTED) [address] [place] [address]; Jose Stephen MD      Acute Care Course: Patient admitted to [facility] for c/o chest pain from -. She underwent a Cardiac Cath. It was based off results patient was transferred to [facility] for a second opinion and poss CABG. See encounter        Sig Hx: Angina     DME:      Conversation: Second and final attempt to contact patient for a wellness check. Unable to reach patient. Caller left a confidential vm with contact information for a return call.     Follow up plan:  Will hand off to [facility]     Non-face-to-face services provided:      Care Transitions 24 Hour Call    Post Acute Services: [facility] Eze Jacobson (Comment: Glendy Baca 78)  Patient DME: [facility] chair  Do you have support at home?: Child, Grandchild  Are you an active caregiver in your home?: No  Care Transitions Interventions         Follow Up  Future Appointments   Date Time Provider Aminata Cortes   9/10/2021  3:15 PM James Coe MD [facility]   2021 11:00 AM MD Aaron Barajas [address] [place] [address]   2021  2:00 PM Micah Mendieta MD [address] [place] [address]   10/22/2021 11:00 AM MD Aaron Barajas [address] [place] [address]   5/10/2022 12:00 PM Yuliya Hunter MD [facility]

## 2021-09-07 ENCOUNTER — TELEPHONE (OUTPATIENT)
Dept: FAMILY MEDICINE CLINIC | Age: 61
End: 2021-09-07

## 2021-09-07 DIAGNOSIS — R19.7 DIARRHEA OF PRESUMED INFECTIOUS ORIGIN: Primary | ICD-10-CM

## 2021-09-07 NOTE — TELEPHONE ENCOUNTER
----- Message from Racheal Gill sent at 9/7/2021  9:28 AM EDT -----  Subject: Message to Provider    QUESTIONS  Information for Provider? Pt has had diarrhea for three days straight. She   is requesting a medication to help stop the diarrhea. She also states she   is eating less, but she is able to stay hydrated. Pt's [facility] [address]. ---------------------------------------------------------------------------  --------------  Michelle Leon INFO  What is the best way for the office to contact you? OK to leave message on   voicemail  Preferred Call Back Phone Number? [phone]  ---------------------------------------------------------------------------  --------------  SCRIPT ANSWERS  Relationship to Patient?  Self

## 2021-09-10 ENCOUNTER — OFFICE VISIT (OUTPATIENT)
Dept: CARDIOLOGY CLINIC | Age: 61
End: 2021-09-10
Payer: MEDICARE

## 2021-09-10 VITALS
OXYGEN SATURATION: 97 % | HEART RATE: 72 BPM | DIASTOLIC BLOOD PRESSURE: 62 MMHG | RESPIRATION RATE: 18 BRPM | BODY MASS INDEX: 33.27 KG/M2 | WEIGHT: 212 LBS | HEIGHT: 67 IN | SYSTOLIC BLOOD PRESSURE: 128 MMHG

## 2021-09-10 DIAGNOSIS — I10 ESSENTIAL (PRIMARY) HYPERTENSION: ICD-10-CM

## 2021-09-10 DIAGNOSIS — Z95.5 STENTED CORONARY ARTERY: ICD-10-CM

## 2021-09-10 DIAGNOSIS — I50.30 DIASTOLIC CONGESTIVE HEART FAILURE, UNSPECIFIED HF CHRONICITY (HCC): ICD-10-CM

## 2021-09-10 DIAGNOSIS — E78.2 MIXED HYPERLIPIDEMIA: ICD-10-CM

## 2021-09-10 DIAGNOSIS — I25.119 CORONARY ARTERY DISEASE INVOLVING NATIVE HEART WITH ANGINA PECTORIS, UNSPECIFIED VESSEL OR LESION TYPE (HCC): ICD-10-CM

## 2021-09-10 DIAGNOSIS — I25.119 CORONARY ARTERY DISEASE INVOLVING NATIVE CORONARY ARTERY OF NATIVE HEART WITH ANGINA PECTORIS (HCC): Primary | ICD-10-CM

## 2021-09-10 DIAGNOSIS — I10 ESSENTIAL HYPERTENSION: ICD-10-CM

## 2021-09-10 PROCEDURE — 99214 OFFICE O/P EST MOD 30 MIN: CPT | Performed by: INTERNAL MEDICINE

## 2021-09-10 PROCEDURE — 1036F TOBACCO NON-USER: CPT | Performed by: INTERNAL MEDICINE

## 2021-09-10 PROCEDURE — G8427 DOCREV CUR MEDS BY ELIG CLIN: HCPCS | Performed by: INTERNAL MEDICINE

## 2021-09-10 PROCEDURE — 93000 ELECTROCARDIOGRAM COMPLETE: CPT | Performed by: INTERNAL MEDICINE

## 2021-09-10 PROCEDURE — G8417 CALC BMI ABV UP PARAM F/U: HCPCS | Performed by: INTERNAL MEDICINE

## 2021-09-10 PROCEDURE — 3017F COLORECTAL CA SCREEN DOC REV: CPT | Performed by: INTERNAL MEDICINE

## 2021-09-10 ASSESSMENT — ENCOUNTER SYMPTOMS
STRIDOR: 0
GASTROINTESTINAL NEGATIVE: 1
EYES NEGATIVE: 1
NAUSEA: 0
BLOOD IN STOOL: 0
RESPIRATORY NEGATIVE: 1
WHEEZING: 0
CHEST TIGHTNESS: 0
SHORTNESS OF BREATH: 0
COUGH: 0

## 2021-09-10 NOTE — PROGRESS NOTES
Dinora Fleming)    Hemodialysis patient [facility])     History of heart failure     History of seizures     History of type C viral hepatitis     HTN (hypertension)     Hyperlipidemia     Impaired mobility and activities of daily living     Mediastinal lymphadenopathy     Dr Yo Christopher, [facility]    Metabolic syndrome     Moderate persistent asthma without complication     Need for extended care facility 2021    Neurogenic urinary incontinence 2013    Neuropathy in diabetes [facility])     Nonrheumatic mitral valve regurgitation 2021    Nonrheumatic tricuspid valve regurgitation 2021    Obesity (BMI 30-39. 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 Fran Gilmore      x1 Dr. Nuris Herrera, Dr Corey Pattison 2018    Laurence Single  08/10/2021    [facility]    DIAGNOSTIC CARDIAC CATH LAB PROCEDURE  10/02/2019    HYSTERECTOMY, TOTAL ABDOMINAL      one ovary intact, Dr Tatiana Aceves, menorrhagia    IN TOTAL KNEE ARTHROPLASTY Left 2018    LEFT KNEE TOTAL KNEE ARTHROPLASTY, SHAYNA, NERVE BLOCK performed by Sarah Silva MD at [address] [place] [address] [facility]      TOE AMPUTATION Right     TOTAL KNEE ARTHROPLASTY  16    Dr Cherylene Bianchi TUNNELED [address] Right 2020    tunneled HD catheter per  Mary Kay Held       Family History   Problem Relation Age of Onset   Elisabeth Gary Cancer Mother 76        survived   Elisabeth Gary Hypertension Father     Diabetes Sister     Mental Illness Sister        Social History     Socioeconomic History    Marital status:      Spouse name: None    Number of children: 2    Years of education: None    Highest education level: None   Occupational History    Occupation: disabled   Tobacco Use    Smoking status: Passive Smoke Exposure - Never Smoker    Smokeless tobacco: Never Used   Vaping Use    Vaping Use: Never used   Substance and Sexual Activity    Alcohol use: No     Alcohol/week: 0.0 standard drinks    Drug use: No    Sexual activity: Not Currently   Other Topics Concern    None   Social History Narrative    Born in [place], one of 5    Twin sister Reyes, very ill in 2018, [place] 2019    Moved to [facility], , 2 children, one son and one daughter    Worked at [employer], as a nurse's aide    Disabled due to mental illness    Lived at [facility], was discharged, returned to independent living in 2017 in the daughter's house and has adjusted well    One son and one daughter, live in the same house with patient, Azam Workman pays the rent    Hobbies reading (misteries)     Social Determinants of Health     Financial Resource Strain: Low Risk     Difficulty of Paying Living Expenses: Not hard at all   Food Insecurity: No Food Insecurity    Worried About [address] in the Last Year: Never true    [address] in the Last Year: Never true   Transportation Needs: No Transportation Needs    Lack of Transportation (Medical): No    Lack of Transportation (Non-Medical):  No   Physical Activity:     Days of Exercise per Week:     Minutes of Exercise per Session:    Stress:     Feeling of Stress :    Social Connections:     Frequency of Communication with Friends and Family:     Frequency of Social Gatherings with Friends and Family:     Attends [facility] Services:     Active Member of Clubs or Organizations:     Attends Club or Organization Meetings:     Marital Status:    Intimate Partner Violence:     Fear of Current or Ex-Partner:     Emotionally Abused:     Physically Abused:     Sexually Abused: Allergies   Allergen Reactions    Codeine Hives     hives    Oxycontin [Oxycodone Hcl] Hives       Current Outpatient Medications   Medication Sig Dispense Refill    midodrine (PROAMATINE) 5 MG tablet Take 5 mg by mouth three times a week Give prior to dialysis.  LANTUS SOLOSTAR 100 UNIT/ML injection pen 55 units at bedtime 10 pen 10    insulin aspart (NOVOLOG FLEXPEN) 100 UNIT/ML injection pen INJECT 8-10  units with each meals (Patient taking differently: Inject 0-8 Units into the skin 3 times daily (before meals) And per sliding scale fo 0-150=0 units; 151-200= 2units; 201-250= 4 units; 251-300=6units; 301-350=8 units; 351-400=1Call MD/  CNP) 10 pen 3    melatonin 10 MG CAPS capsule Take 1 capsule by mouth nightly 30 capsule 12    aspirin EC 81 MG EC tablet Take 1 tablet by mouth daily 30 tablet 12    metoprolol tartrate (LOPRESSOR) 25 MG tablet Take 0.5 tablets by mouth 2 times daily 90 tablet 0    ARIPiprazole (ABILIFY) 5 MG tablet TAKE 1 TABLET BY MOUTH ONCE DAILY 30 tablet 2    magnesium oxide (MAG-OX) 400 MG tablet Take 1 tablet by mouth daily 90 tablet 4    atorvastatin (LIPITOR) 40 MG tablet Take 1 tablet by mouth daily 90 tablet 4    Insulin Pen Needle (SURE COMFORT PEN NEEDLES) 30G X 8 MM MISC USE AS DIRECTED FIVE TIMES A DAILY 100 each 10    blood glucose test strips (FREESTYLE LITE) strip 1 each by Does not apply route 4 times daily (before meals and nightly) As needed.  200 strip 5    Alcohol Swabs (EASY TOUCH ALCOHOL PREP MEDIUM) 70 % PADS USE AS DIRECTED THREE TIMES A  each 3    FreeStyle Lancets MISC Test 4x daily 150 each 3    docusate sodium (COLACE, DULCOLAX) 100 MG CAPS Take 100 mg by mouth 2 times daily For the prevention and treatment of constipation while taking pain medications. 30 capsule 0    acetaminophen (TYLENOL) 325 MG tablet Take 2 tablets by mouth every 4 hours as needed for Pain (For mild to moderate pain (Pain 1-6 out of 10 on pain scale)) 120 tablet 0    Misc. Devices (BARIATRIC ROLLATOR) MISC Rolllator with a basket 1 each 0    Blood Glucose Monitoring Suppl (FREESTYLE LITE) CORETTA 1 Device by Does not apply route daily as needed (Diabetes) Use freestyle meter to test blood sugar as needed 1 Device 0    ranolazine (RANEXA) 500 MG extended release tablet Take 1 tablet by mouth 2 times daily 180 tablet 2    BRILINTA 90 MG TABS tablet TAKE 1 TABLET BY MOUTH 2 TIMES DAILY 60 tablet 11    pantoprazole (PROTONIX) 20 MG tablet TAKE 1 TABLET BY MOUTH DAILY 90 tablet 3    Misc. Devices [place]) MISC To use with transport outside of the house. 1 each 0    isosorbide mononitrate (IMDUR) 60 MG extended release tablet Take 1 tablet by mouth daily (Patient taking differently: Take 120 mg by mouth daily ) 90 tablet 3    nitroGLYCERIN (NITROSTAT) 0.4 MG SL tablet Place 1 tablet under the tongue every 5 minutes as needed for Chest pain      bismuth subsalicylate (BISMATROL) 262 MG/15ML suspension Take 30 mLs by mouth 4 times daily as needed for Indigestion 118 mL 0    sertraline (ZOLOFT) 50 MG tablet TAKE 1 TABLET BY MOUTH DAILY 90 tablet 3     No current facility-administered medications for this visit. Review of Systems:   Review of Systems   Constitutional: Negative. Negative for diaphoresis and fatigue. HENT: Negative. Eyes: Negative. Respiratory: Negative. Negative for cough, chest tightness, shortness of breath, wheezing and stridor. Cardiovascular: Negative. Negative for chest pain, palpitations and leg swelling. Gastrointestinal: Negative. Negative for blood in stool and nausea. Genitourinary: Negative. Musculoskeletal: Negative. Skin: Negative. Neurological: Negative. Negative for dizziness, syncope, weakness and light-headedness. Hematological: Negative. Psychiatric/Behavioral: Negative. Physical Examination:    /62 (Site: Left Upper Arm, Position: Sitting, Cuff Size: Medium Adult)   Pulse 72   Resp 18   Ht 5' 7\" (1.702 m)   Wt 212 lb (96.2 kg)   LMP  (LMP Unknown)   SpO2 97%   BMI 33.20 kg/m²    Physical Exam   Constitutional: She appears healthy. No distress. HENT:   Normal cephalic and Atraumatic   Eyes: Pupils are equal, round, and reactive to light. Neck: Thyroid normal. No JVD present. No neck adenopathy. No thyromegaly present. Cardiovascular: Normal rate, regular rhythm, intact distal pulses and normal pulses. Murmur heard. Pulmonary/Chest: Effort normal and breath sounds normal. She has no wheezes. She has no rales. She exhibits no tenderness. Abdominal: Soft. Bowel sounds are normal. There is no abdominal tenderness. Musculoskeletal:         General: No tenderness or edema. Normal range of motion. Cervical back: Normal range of motion and neck supple. Neurological: She is alert and oriented to person, place, and time. Skin: Skin is warm. No cyanosis. Nails show no clubbing.        LABS:  CBC:   Lab Results   Component Value Date    WBC 7.0 08/18/2021    RBC 3.24 08/18/2021    HGB 10.6 08/18/2021    HCT 30.0 08/18/2021    MCV 92.6 08/18/2021    MCH 32.6 08/18/2021    MCHC 35.2 08/18/2021    RDW 16.8 08/18/2021     08/18/2021    MPV 10.0 03/05/2020     Lipids:  Lab Results   Component Value Date    CHOL 101 06/11/2020    CHOL 107 05/25/2020    CHOL 137 02/26/2019     Lab Results   Component Value Date    TRIG 82 06/11/2020    TRIG 85 05/25/2020    TRIG 116 02/26/2019     Lab Results   Component Value Date    HDL 48 06/11/2020    HDL 44 05/25/2020    HDL 55 02/26/2019     Lab Results   Component Value Date    LDLCALC 37 06/11/2020    LDLCALC 46 05/25/2020    LDLCALC 59 02/26/2019     Lab Results   Component Value Date    LABVLDL 59.0 05/04/2013    VLDL 43 01/30/2017     Lab Results   Component Value Date    CHOLHDLRATIO 4.32 01/30/2017     CMP:    Lab Results   Component Value Date     08/18/2021    K 3.4 08/18/2021    K 3.9 08/01/2021    CL 89 08/18/2021    CO2 29 08/18/2021    BUN 28 08/18/2021    CREATININE 4.51 08/18/2021    GFRAA 11.9 08/18/2021    LABGLOM 9.8 08/18/2021    GLUCOSE 196 08/18/2021    GLUCOSE 419 07/30/2021    PROT 6.9 08/01/2021    LABALBU 4.1 08/01/2021    CALCIUM 9.7 08/18/2021    BILITOT 0.5 08/01/2021    ALKPHOS 98 08/01/2021    AST 20 08/01/2021    ALT 15 08/01/2021     BMP:    Lab Results   Component Value Date     08/18/2021    K 3.4 08/18/2021    K 3.9 08/01/2021    CL 89 08/18/2021    CO2 29 08/18/2021    BUN 28 08/18/2021    LABALBU 4.1 08/01/2021    CREATININE 4.51 08/18/2021    CALCIUM 9.7 08/18/2021    GFRAA 11.9 08/18/2021    LABGLOM 9.8 08/18/2021    GLUCOSE 196 08/18/2021    GLUCOSE 419 07/30/2021     Magnesium:    Lab Results   Component Value Date    MG 2.1 02/24/2021     TSH:  Lab Results   Component Value Date    TSH 0.856 07/16/2021       Patient Active Problem List   Diagnosis    Atherosclerotic heart disease of native coronary artery with unspecified angina pectoris (HCC)    Schizophrenia, paranoid, chronic    Metabolic syndrome    Vitamin B 12 deficiency    Cerebral microvascular disease    Mixed hyperlipidemia    Other hammer toe (acquired)    Vitamin D insufficiency    Incontinence of urine    Diabetic nephropathy with proteinuria ([address]    Essential (primary) hypertension    History of type C viral hepatitis    Urinary incontinence due to cognitive impairment    History of seizures    Stented coronary artery-plan is to stay on Plavix indefinately per Dr Guy Blake Other specified diabetes mellitus with diabetic neuropathy, unspecified ([address]    Controlled type 2 diabetes mellitus with diabetic neuropathy, with long-term current use of insulin (HCC)    Hemiparesis, left ([facility])    Angina, class II ([facility])    Pain, unspecified    Tardive dyskinesia    Shortness of breath    Uncontrolled type 2 diabetes mellitus with hyperglycemia ([facility])    Chronic diastolic congestive heart failure ([facility])    Sleep apnea, unspecified    Pulmonary hypertension, unspecified ([facility])    Class 2 severe obesity with serious comorbidity and body mass index (BMI) of 36.0 to 36.9 in adult [facility])    Edema    Closed supracondylar fracture of right humerus    Other chronic pain    Palliative care patient    Angina at rest [facility])    Recurrent falls    Renal failure    Difficulty in walking    ESRD (end stage renal disease) on dialysis (HCC)    Weakness    Other seizures ([facility])    Moderate persistent asthma without complication    Thrush    COVID-19    Post PTCA    Falls frequently    Contusion of right chest wall    Chest wall contusion, left, initial encounter    Headache, unspecified    Encounter for immunization    Paranoid schizophrenia [address]    Compression fracture of spine ([address]    Closed rib fracture    Depression    Chronic obstructive pulmonary disease ([address]    Critical illness polyneuropathy ([address]    Multiple closed fractures of ribs of right side    Nonrheumatic mitral valve regurgitation    Nonrheumatic tricuspid valve regurgitation    Need for extended care facility    Chronic pain of both shoulders    Melena       There are no discontinued medications. Modified Medications    No medications on file       No orders of the defined types were placed in this encounter. Assessment/Plan:    1. Coronary artery disease involving native heart with angina pectoris, unspecified vessel or lesion type ([address]  Stable. - continue meds    Refills given    2. Diastolic congestive heart failure, unspecified HF chronicity ([facility])  Stable. No edema. - continue meds. Low salt diet    3. Stented coronary artery    4.  Shortness of breath stable - no worse    5. Mixed hyperlipidemia  Statin - long term. Check labs. Low fat diet. 6. Essential hypertension    Stable. Continue meds. Low salt diet. 7. Coronary artery disease involving native coronary artery of native heart with angina pectoris (HCC)         Counseling:  Heart Healthy Lifestyle, Low Salt Diet, Take Precautions to Prevent Falls and Walk Daily    Return in about 3 months (around 12/10/2021).       Electronically signed by Kristen Hodgkin, MD on 9/10/2021 at 3:24 PM

## 2021-09-10 NOTE — TELEPHONE ENCOUNTER
Patient called back to let you know that she still have diarrhea and it is dark green now. She is fully vaccinated and had a covid test last week which it was negative. Would like to know what to do now. Please advise and thank you!

## 2021-09-15 NOTE — TELEPHONE ENCOUNTER
Tommi Skiff that patient was on 100mg in [address] and she feels better. However, Adeola Marmolejo states if you prefer her to stay on dosage how would you like to dereje her down from her current 100mg doseage?      Please, advise & thank you  MU Rooney

## 2021-09-15 NOTE — TELEPHONE ENCOUNTER
When Bishop Remy was discharged from the hospital on 8/1, she was discharged on sertraline 50 mg. There is no need to taper. If she would like to discuss changing the dose, she is welcome to schedule an appt.

## 2021-09-16 ASSESSMENT — ENCOUNTER SYMPTOMS
BACK PAIN: 1
COUGH: 0
ABDOMINAL DISTENTION: 0
SHORTNESS OF BREATH: 0

## 2021-09-16 NOTE — PROGRESS NOTES
Patient Name: Carmen Pearl  YOB: 1960  Medical Record Number: [id number]     History of Present Illness: This is a 20-year-old woman seen for follow-up visit for recent hospitalization. Patient has a known history of end-stage renal disease hypertension COPD. Patient had episode of chest pain was hospitalized did undergo valve possibility of acute cardiac event patient was hospitalized did undergo evaluation by cardiology did undergo cardiac catheterization after monitoring with EKG and troponins. Patient did undergo cath which showed mid distal LAD in-stent restenosis in the setting of double layer is statin calcification. Patient has been felt high risk transfer initials of patient's insert initial inpatient setting to higher level of care patient did undergo cardiac catheterization there patient did undergo balloon angioplasty patient was stabilized patient did undergo PCI with rotational thyroidectomy shockwave therapy patient did tolerated procedure well postop the patient had ejection fraction of 60% patient did not have any acute new decompensation from cardiac standpoint nor did she have evidence of a significant hematoma stabilized and transferred back here for ongoing course of care. Review of Systems   Constitutional: Positive for fatigue. Negative for fever. HENT: Negative for congestion. Respiratory: Negative for cough and shortness of breath. Cardiovascular: Positive for chest pain. Negative for palpitations and leg swelling. Gastrointestinal: Negative for abdominal distention. Genitourinary: Negative for dysuria. Musculoskeletal: Positive for arthralgias and back pain. Neurological: Positive for weakness. Psychiatric/Behavioral: Negative for agitation. All other systems reviewed and are negative.       Review of Systems: All 14 review of systems negative other than as stated above    Social History     Tobacco Use    Smoking status: Passive Smoke Exposure - Never Smoker    Smokeless tobacco: Never Used   Vaping Use    Vaping Use: Never used   Substance Use Topics    Alcohol use: No     Alcohol/week: 0.0 standard drinks    Drug use: No         Past Medical History:   Diagnosis Date    Anxiety     CAD S/P percutaneous coronary angioplasty 2015, 2018    stents per dr iTta Salas CH[facility]congestive heart failure) [facility]     CKD (chronic kidney disease) stage 4, GFR 15-29 ml/min ([facility] 2/24/2018    CKD stage 4 due to type 2 diabetes mellitus ([address]     COPD (chronic obstructive pulmonary disease) ([facility]     Diabetic nephropathy with proteinuria ([facility] 2014    DJD (degenerative joint disease) of knee     Dr Zuri Savage GERD (gastroesophageal reflux disease)     Hemiparesis, left ([facility] 2013    [facility] ([facility]    Hemod[facility] patient [facility])     History of heart failure     History of seizures     History of type C viral hepatitis     HTN (hypertension)     Hyperlipidemia     Impaired mobility and activities of daily living     Mediastinal lymphadenopathy 2013    Travis Caraballo    Metabolic syndrome     Moderate persistent asthma without complication 4/13/8973    Need for extended care facility 7/7/2021    Neurogenic urinary incontinence 2013    Neuropathy in diabetes [facility])     Nonrheumatic mitral valve regurgitation 7/7/2021    Nonrheumatic tricuspid valve regurgitation 7/7/2021    Obesity (BMI 30-39. 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 Wheat Flies      x1 Dr. Denisse Melchor, Dr Tali Persaud  08/10/2021    [facility]    DIAGNOSTIC CARDIAC CATH LAB PROCEDURE  10/02/2019    HYSTERECTOMY, TOTAL ABDOMINAL      one ovary intact, Dr Denis Israel, menorrhagia    [place] TOTAL KNEE ARTHROPLASTY Left 2018    LEFT KNEE TOTAL KNEE ARTHROPLASTY, SHAYNA, NERVE BLOCK performed by Patsy Kohler MD at [address] [place] [address] [facility]CA      TOE AMPUTATION Right     TOTAL KNEE ARTHROPLASTY  16    Dr Tiara Brown TUN[address]ELED [address] Right 2020    tunneled HD catheter per Dr Milton Santana         Current Outpatient Medications on File Prior to Visit   Medication Sig Dispense Refill    LANTUS SOLOSTAR 100 UNIT/ML injection pen 55 units at bedtime 10 pen 10    insulin aspart (NOVOLOG FLEXPEN) 100 UNIT/ML injection pen INJECT 8-10  units with each meals (Patient taking differently: Inject 0-8 Units into the skin 3 times daily (before meals) And per sliding scale fo 0-150=0 units; 151-200= 2units; 201-250= 4 units; 251-300=6units; 301-350=8 units; 351-400=1Call MD/  CNP) 10 pen 3    melatonin 10 MG CAPS capsule Take 1 capsule by mouth nightly 30 capsule 12    aspirin EC 81 MG EC tablet Take 1 tablet by mouth daily 30 tablet 12    metoprolol tartrate (LOPRESSOR) 25 MG tablet Take 0.5 tablets by mouth 2 times daily 90 tablet 0    ARIPiprazole (ABILIFY) 5 MG tablet TAKE 1 TABLET BY MOUTH ONCE DAILY 30 tablet 2    magnesium oxide (MAG-OX) 400 MG tablet Take 1 tablet by mouth daily 90 tablet 4    atorvastatin (LIPITOR) 40 MG tablet Take 1 tablet by mouth daily 90 tablet 4    Insulin Pen Needle (SURE COMFORT PEN NEEDLES) 30G X 8 MM MISC USE AS DIRECTED FIVE TIMES A DAILY 100 each 10    blood glucose test strips (FREESTYLE LITE) strip 1 each by Does not apply route 4 times daily (before meals and nightly) As needed. 200 strip 5    Alcohol Swabs (EASY TOUCH ALCOHOL PREP MEDIUM) 70 % PADS USE AS DIRECTED THREE TIMES A  each 3    FreeStyle Lancets MISC Test 4x daily 150 each 3    docusate sodium (COLACE, DULCOLAX) 100 MG CAPS Take 100 mg by mouth 2 times daily For the prevention and treatment of constipation while taking pain medications. 30 capsule 0    acetaminophen (TYLENOL) 325 MG tablet Take 2 tablets by mouth every 4 hours as needed for Pain (For mild to moderate pain (Pain 1-6 out of 10 on pain scale)) 120 tablet 0    Misc. Devices (BARIATRIC ROLLATOR) MISC Rolllator with a basket 1 each 0    Blood Glucose Monitoring Suppl (FREESTYLE LITE) CORETTA 1 Device by Does not apply route daily as needed (Diabetes) Use freestyle meter to test blood sugar as needed 1 Device 0    ranolazine (RANEXA) 500 MG extended release tablet Take 1 tablet by mouth 2 times daily 180 tablet 2    BRILINTA 90 MG TABS tablet TAKE 1 TABLET BY MOUTH 2 TIMES DAILY 60 tablet 11    pantoprazole (PROTONIX) 20 MG tablet TAKE 1 TABLET BY MOUTH DAILY 90 tablet 3    sertraline (ZOLOFT) 50 MG tablet TAKE 1 TABLET BY MOUTH DAILY 90 tablet 3    Misc. Devices [facility] To use with transport outside of the house. 1 each 0    isosorbide mononitrate (IMDUR) 60 MG extended release tablet Take 1 tablet by mouth daily (Patient taking differently: Take 120 mg by mouth daily ) 90 tablet 3    nitroGLYCERIN (NITROSTAT) 0.4 MG SL tablet Place 1 tablet under the tongue every 5 minutes as needed for Chest pain       No current facility-administered medications on file prior to visit.        Allergies   Allergen Reactions    Codeine Hives     hives    Oxycontin [Oxycodone Hcl] Hives         Family History   Problem Relation Age of Onset    Cancer Mother 76        survived   Lilyan Montane Hypertension Father     Diabetes Sister     Mental Illness Sister          Physical Exam:      Physical Exam  Vitals and nursing note reviewed. Constitutional:       Appearance: Normal appearance. HENT:      Head: Normocephalic and atraumatic. Nose: Nose normal.      Mouth/Throat:      Mouth: Mucous membranes are moist.   Eyes:      Pupils: Pupils are equal, round, and reactive to light. Cardiovascular:      Rate and Rhythm: Normal rate. Pulmonary:      Effort: Pulmonary effort is normal.      Breath sounds: Normal breath sounds. Abdominal:      General: Abdomen is flat. Bowel sounds are normal. There is no distension. Palpations: Abdomen is soft. Musculoskeletal:         General: No swelling. Normal range of motion. Cervical back: Normal range of motion. Skin:     General: Skin is warm. Comments: Bruising in the right groin   Neurological:      General: No focal deficit present. Psychiatric:         Mood and Affect: Mood normal.         not currently breastfeeding.       .   Lab Results   Component Value Date    WBC 7.0 08/18/2021    HGB 10.6 (L) 08/18/2021    HCT 30.0 (L) 08/18/2021    MCV 92.6 08/18/2021     08/18/2021     Lab Results   Component Value Date     08/18/2021    K 3.4 08/18/2021    K 3.9 08/01/2021    CL 89 08/18/2021    CO2 29 08/18/2021    BUN 28 08/18/2021    CREATININE 4.51 08/18/2021    GLUCOSE 196 08/18/2021    GLUCOSE 419 07/30/2021    CALCIUM 9.7 08/18/2021                ASSESSMENT:  Patient Active Problem List   Diagnosis    Atherosclerotic heart disease of native coronary artery with unspecified angina pectoris (HCC)    Schizophrenia, paranoid, chronic    Metabolic syndrome    Vitamin B 12 deficiency    Cerebral microvascular disease    Mixed hyperlipidemia    Other hammer toe (acquired)    Vitamin D insufficiency    Incontinence of urine    Diabetic nephropathy with proteinuria [facility]    Essential (primary) hypertension    History of type C viral hepatitis    Urinary incontinence due to cognitive impairment    History of seizures    Stented coronary artery-plan is to stay on Plavix indefinately per Dr Julia Marroquin Other specified diabetes mellitus with diabetic neuropathy, unspecified ([address]    Controlled type 2 diabetes mellitus with diabetic neuropathy, with long-term current use of insulin ([facility])    Hemiparesis, left ([facility])    Angina, class II ([address]    Pain, unspecified    Tardive dyskinesia    Shortness of breath    Uncontrolled type 2 diabetes mellitus with hyperglycemia ([facility])    Chronic diastolic congestive heart failure ([facility])    Sleep apnea, unspecified    Pulmonary hypertension, unspecified ([facility])    Class 2 severe obesity with serious comorbidity and body mass index (BMI) of 36.0 to 36.9 in adult ([facility])    Edema    Closed supracondylar fracture of right humerus    Other chronic pain    Palliative care patient    Angina at rest [facility])    Recurrent falls    Renal failure    Difficulty in walking    ESRD (end stage renal disease) on dialysis ([facility])    Weakness    Other seizures ([facility])    Moderate persistent asthma without complication    Thrush    COVID-19    Post PTCA    Falls frequently    Contusion of right chest wall    Chest wall contusion, left, initial encounter    Headache, unspecified    Encounter for immunization    Paranoid schizophrenia ([address]    Compression fracture of spine ([address]    Closed rib fracture    Depression    Chronic obstructive pulmonary disease ([facility])    Critical illness polyneuropathy ([address]    Multiple closed fractures of ribs of right side    Nonrheumatic mitral valve regurgitation    Nonrheumatic tricuspid valve regurgitation    Need for extended care facility    Chronic pain of both shoulders    Melena         PLAN:   Diagnosis Orders   1. Essential hypertension     2. Angina at rest [facility])     3.  ESRD (end stage renal disease) on dialysis ([address]     4. Paranoid schizophrenia ([address]           Continue course of antihypertensive regimen. Monitor renal function. Continue with course of hemodialysis clinically stable time notes no acute psychosis in the current psychiatric regimen. Monitoring for episodes recurrent chest pain continue with nitroglycerin as needed. Continue to monitor systolic pressure repeat blood work pending will follow clinically.

## 2021-09-17 ENCOUNTER — OFFICE VISIT (OUTPATIENT)
Dept: FAMILY MEDICINE CLINIC | Age: 61
End: 2021-09-17
Payer: MEDICARE

## 2021-09-17 VITALS
BODY MASS INDEX: 34.18 KG/M2 | TEMPERATURE: 96.5 F | SYSTOLIC BLOOD PRESSURE: 88 MMHG | DIASTOLIC BLOOD PRESSURE: 58 MMHG | RESPIRATION RATE: 18 BRPM | OXYGEN SATURATION: 98 % | WEIGHT: 217.8 LBS | HEART RATE: 59 BPM | HEIGHT: 67 IN

## 2021-09-17 DIAGNOSIS — R19.7 DIARRHEA OF PRESUMED INFECTIOUS ORIGIN: ICD-10-CM

## 2021-09-17 DIAGNOSIS — E86.1 HYPOTENSION DUE TO HYPOVOLEMIA: Primary | ICD-10-CM

## 2021-09-17 DIAGNOSIS — R29.6 FALLS FREQUENTLY: ICD-10-CM

## 2021-09-17 DIAGNOSIS — R82.998 DARK URINE: ICD-10-CM

## 2021-09-17 DIAGNOSIS — Z23 NEED FOR INFLUENZA VACCINATION: ICD-10-CM

## 2021-09-17 DIAGNOSIS — I95.89 HYPOTENSION DUE TO HYPOVOLEMIA: Primary | ICD-10-CM

## 2021-09-17 DIAGNOSIS — I10 ESSENTIAL (PRIMARY) HYPERTENSION: ICD-10-CM

## 2021-09-17 DIAGNOSIS — F33.1 MODERATE EPISODE OF RECURRENT MAJOR DEPRESSIVE DISORDER (HCC): ICD-10-CM

## 2021-09-17 DIAGNOSIS — Z23 NEED FOR SHINGLES VACCINE: ICD-10-CM

## 2021-09-17 PROBLEM — K92.1 MELENA: Status: RESOLVED | Noted: 2021-08-05 | Resolved: 2021-09-17

## 2021-09-17 PROBLEM — B37.0 THRUSH: Chronic | Status: RESOLVED | Noted: 2020-12-24 | Resolved: 2021-09-17

## 2021-09-17 LAB
BILIRUBIN, POC: ABNORMAL
BLOOD URINE, POC: ABNORMAL
CLARITY, POC: ABNORMAL
COLOR, POC: YELLOW
GLUCOSE URINE, POC: ABNORMAL
KETONES, POC: ABNORMAL
LEUKOCYTE EST, POC: ABNORMAL
NITRITE, POC: ABNORMAL
PH, POC: 5
PROTEIN, POC: ABNORMAL
SPECIFIC GRAVITY, POC: 1.03
UROBILINOGEN, POC: ABNORMAL

## 2021-09-17 PROCEDURE — 3017F COLORECTAL CA SCREEN DOC REV: CPT | Performed by: FAMILY MEDICINE

## 2021-09-17 PROCEDURE — 90674 CCIIV4 VAC NO PRSV 0.5 ML IM: CPT | Performed by: FAMILY MEDICINE

## 2021-09-17 PROCEDURE — 81002 URINALYSIS NONAUTO W/O SCOPE: CPT | Performed by: FAMILY MEDICINE

## 2021-09-17 PROCEDURE — 99214 OFFICE O/P EST MOD 30 MIN: CPT | Performed by: FAMILY MEDICINE

## 2021-09-17 PROCEDURE — G8427 DOCREV CUR MEDS BY ELIG CLIN: HCPCS | Performed by: FAMILY MEDICINE

## 2021-09-17 PROCEDURE — G8417 CALC BMI ABV UP PARAM F/U: HCPCS | Performed by: FAMILY MEDICINE

## 2021-09-17 PROCEDURE — G0008 ADMIN INFLUENZA VIRUS VAC: HCPCS | Performed by: FAMILY MEDICINE

## 2021-09-17 PROCEDURE — 1036F TOBACCO NON-USER: CPT | Performed by: FAMILY MEDICINE

## 2021-09-17 RX ORDER — DIPHENOXYLATE HYDROCHLORIDE AND ATROPINE SULFATE 2.5; .025 MG/1; MG/1
1 TABLET ORAL 4 TIMES DAILY PRN
Qty: 40 TABLET | Refills: 0 | Status: SHIPPED | OUTPATIENT
Start: 2021-09-17 | End: 2021-09-27

## 2021-09-17 RX ORDER — AMLODIPINE BESYLATE 10 MG/1
10 TABLET ORAL DAILY
COMMUNITY
End: 2021-10-11 | Stop reason: ALTCHOICE

## 2021-09-17 NOTE — PROGRESS NOTES
[address] [facility], 59 y.o. female presents today with:  Chief Complaint   Patient presents with    Diarrhea     for a week or so; color: green    Fall     had a fall yesterday and fell on her right knee    Urinary Tract Infection     urine has been dark for a while            HPI    BP has been running low per patient. No dizziness. No chest pain. NP at HD wrote for amlodipine 10 mg on HD days. Diarrhea persists intermitently in spite of MOM, imodium and Pepto. Stool studies have been normal.     Depression - Gets depressed quite a bit; feels lonely; has [place] Kenyatta anxiety. \" Takes Abilify and Zoloft. Does not want med adjustments.         No other questions and or concerns for today's visit      Review of Systems      Past Medical History:   Diagnosis Date    Anxiety     CAD S/P percutaneous coronary angioplasty 2015, 2018    stents per dr Nicolás Montoya CHF (congestive heart failure) ([address]     CKD (chronic kidney disease) stage 4, GFR 15-29 ml/min ([address] 2/24/2018    CKD stage 4 due to type 2 diabetes mellitus ([address]     COPD (chronic obstructive pulmonary disease) ([address]     Diabetic nephropathy with proteinuria ([address] 2014    DJD (degenerative joint disease) of knee     Dr Eriberto Allen GERD (gastroesophageal reflux disease)     Hemiparesis, left ([address] 2013    [facility]    Hemodialysis patient [facility])     History of heart failure     History of seizures     History of type C viral hepatitis     HTN (hypertension)     Hyperlipidemia     Impaired mobility and activities of daily living     Mediastinal lymphadenopathy 2013    Mague Viveros    Metabolic syndrome     Moderate persistent asthma without complication 7/71/8949    Need for extended care facility 7/7/2021    Neurogenic urinary incontinence 2013    Neuropathy in diabetes [facility])     Nonrheumatic mitral valve regurgitation 7/7/2021    Nonrheumatic tricuspid valve regurgitation 7/7/2021  Obesity (BMI 30-39. 9)     Recurrent UTI     S/P colonoscopy     [facility], focal active colitis    Schizophrenia, paranoid, chronic ([address]     [facility]   [employer] vessel disease, cerebrovascular     Status post total knee replacement, right     Status post total left knee replacement 2018   Cl Prakash 2020    Traumatic [facility] of third toe of right foot [facility]     Type 2 diabetes mellitus with renal manifestations, controlled ([facility]     Insulin dependent, Dr Panchito Marquis Urinary incontinence due to cognitive impairment     Vitamin D deficiency      Past Surgical History:   Procedure Laterality Date     SECTION      x1    COLONOSCOPY  2014    Dr. Avila Ala      x1 Dr. Maribell Mcdonough, Dr Regina Castellanos  [facility]10/20[facility]    DIAGNOSTIC CARDIAC CATH LAB PROCEDURE  10/02/2019    HYSTERECTOMY, TOTAL ABDOMINAL      one ovary intact, Dr Deidra Mcdonald, menorrhagia    [place] TOTAL KNEE ARTHROPLASTY Left 2018    LEFT KNEE TOTAL KNEE ARTHROPLASTY, SHAYNA, NERVE BLOCK performed by Katy Sosa MD at [address] [place] [address] [facility]      TOE AMPUTATION Right     TOTAL KNEE ARTHROPLASTY  16    Dr Lion Blood TUNNELED [address] Right 2020    tunneled HD catheter per Dr Zamzam Allen Marital status:      Spouse name: Not on file    Number of children: 2    Years of education: Not on file    Highest education level: Not on file   Occupational History    Occupation: disabled   Tobacco Use    Smoking status: Passive Smoke Exposure - Never Smoker    Smokeless tobacco: Never Used   Vaping Use    Vaping Use: Never used   Substance and Sexual Activity    Alcohol use: No     Alcohol/week: 0.0 standard drinks    Drug use: No    Sexual activity: Not Currently   Other Topics Concern    Not on file   Social History capsule 0    acetaminophen (TYLENOL) 325 MG tablet Take 2 tablets by mouth every 4 hours as needed for Pain (For mild to moderate pain (Pain 1-6 out of 10 on pain scale)) 120 tablet 0    Misc. Devices (BARIATRIC ROLLATOR) MISC Rolllator with a basket 1 each 0    Blood Glucose Monitoring Suppl (FREESTYLE LITE) CORETTA 1 Device by Does not apply route daily as needed (Diabetes) Use freestyle meter to test blood sugar as needed 1 Device 0    ranolazine (RANEXA) 500 MG extended release tablet Take 1 tablet by mouth 2 times daily 180 tablet 2    BRILINTA 90 MG TABS tablet TAKE 1 TABLET BY MOUTH 2 TIMES DAILY 60 tablet 11    pantoprazole (PROTONIX) 20 MG tablet TAKE 1 TABLET BY MOUTH DAILY 90 tablet 3    sertraline (ZOLOFT) 50 MG tablet TAKE 1 TABLET BY MOUTH DAILY 90 tablet 3    Misc. Devices [facility] To use with transport outside of the house. 1 each 0    isosorbide mononitrate (IMDUR) 60 MG extended release tablet Take 1 tablet by mouth daily (Patient taking differently: Take 120 mg by mouth daily ) 90 tablet 3    nitroGLYCERIN (NITROSTAT) 0.4 MG SL tablet Place 1 tablet under the tongue every 5 minutes as needed for Chest pain       No current facility-administered medications for this visit. PMH, Surgical Hx, Family Hx, and Social Hxreviewed and updated. Health Maintenance reviewed. Objective    Vitals:    09/17/21 1207   BP: (!) 88/58   Pulse: 59   Resp: 18   Temp: 96.5 °F (35.8 °C)   TempSrc: Temporal   SpO2: 98%   Weight: 217 lb 12.8 oz (98.8 kg)   Height: 5' 7\" (1.702 m)        Physical Exam  Constitutional:       General: She is not in acute distress. Appearance: She is obese. HENT:      Head: Normocephalic and atraumatic. Eyes:      General: No scleral icterus. Conjunctiva/sclera: Conjunctivae normal.   Cardiovascular:      Rate and Rhythm: Normal rate and regular rhythm. Heart sounds: Normal heart sounds. Pulmonary:      Effort: No respiratory distress.       Breath sounds: No wheezing or rales. Skin:     General: Skin is warm and dry. Comments: Bruising B/L knees; frequent picking of skin n arms with bleeding   Neurological:      Mental Status: She is alert and oriented to person, place, and time. Psychiatric:         Attention and Perception: Attention normal.         Mood and Affect: Affect is flat. Speech: Speech is delayed. Behavior: Behavior is slowed. Behavior is cooperative. Judgment: Judgment is inappropriate. Lab Results   Component Value Date    LABA1C 6.2 (H) 08/18/2021    LABA1C 6.6 (H) 07/16/2021    LABA1C 6.4 (H) 06/21/2021     Lab Results   Component Value Date    LABMICR 183.30 (H) 08/20/2019    CREATININE 4.51 (H) 08/18/2021     Lab Results   Component Value Date    ALT 15 08/01/2021    AST 20 08/01/2021     Lab Results   Component Value Date    CHOL 101 06/11/2020    TRIG 82 06/11/2020    HDL 48 06/11/2020    LDLCALC 37 06/11/2020        Assessment & Plan   Visit Diagnoses and Associated Orders     Hypotension due to hypovolemia    -  Primary    Suspect related to meds and HD yesterday. Urged her to review new meds with nephrologist next week. Falls frequently        Fell at home again this week. Bruises on knees. Says she will think about residing in SNF because she never falls while in SNF. Is doing PT. Diarrhea of presumed infectious origin        referred to gastro; rx Mark Hackett MD, Gastroenterology, [facility] Custom]      diphenoxylate-atropine (DIPHENATOL) 2.5-0.025 MG per tablet [[id number]]           Moderate episode of recurrent major depressive disorder ([address]        stable, fair control         Essential (primary) hypertension        Will monitor. Largely managed by nephrology.          Dark urine        no infection    POCT Urinalysis no Micro [[id number] Custom]           Need for influenza vaccination        INFLUENZA, MDCK QUADV, 2 YRS AND OLDER, IM, PF, PREFILL SYR OR SDV, 0.5ML (FLUCELVAX QUADV, PF) [id number] Custom]           Need for shingles vaccine        zoster recombinant adjuvanted vaccine (SHINGRIX) 50 MCG/0.5ML SUSR injection [[id number]]           ORDERS WITHOUT AN ASSOCIATED DIAGNOSIS    amLODIPine (NORVASC) 10 MG tablet [[id number]]              Reviewed with the patient: all disease processes, current clinical status, medications, activities and diet.      Side effects, adverse effects of the medication prescribed today, as well as treatment plan/ rationale and result expectations have been discussed with the patient who expresses understanding and desires to proceed.     Close follow up to evaluate treatment results and for coordination of care. I have reviewed the patient's medical history in detail and updated the computerized patient record. More than 50% of the appointment was spent in face-to-face counseling, education and care coordination. Orders Placed This Encounter   Procedures    INFLUENZA, MDCK QUADV, 2 YRS AND OLDER, IM, PF, PREFILL SYR OR SDV, 0.5ML (Sundra Saupe, PF)   Janice Tabor MD, Gastroenterology, [facility]     Referral Priority:   Routine     Referral Type:   Eval and Treat     Referral Reason:   Specialty Services Required     Referred to Provider:   Chasity Fairchild MD     Requested Specialty:   Gastroenterology     Number of Visits Requested:   1    POCT Urinalysis no Micro     Orders Placed This Encounter   Medications    diphenoxylate-atropine (DIPHENATOL) 2.5-0.025 MG per tablet     Sig: Take 1 tablet by mouth 4 times daily as needed for Diarrhea for up to 10 days.      Dispense:  40 tablet     Refill:  0    zoster recombinant adjuvanted vaccine (SHINGRIX) 50 MCG/0.5ML SUSR injection     Sig: Inject 0.5 mLs into the muscle See Admin Instructions 1 dose now and repeat in 2-6 months     Dispense:  0.5 mL     Refill:  0     Medications Discontinued During This Encounter   Medication Reason    bismuth subsalicylate (BISMATROL) 262 MG/15ML suspension Patient Choice     Return in about 3 months (around 12/17/2021) for multiple chronic problems - OV. Controlled Substance Monitoring:    Acute and Chronic Pain Monitoring:   RX Monitoring 11/18/2019   Attestation -   Acute Pain Prescriptions Prescription exceeds daily limit for a specific reason. See comments or note. Periodic Controlled Substance Monitoring No signs of potential drug abuse or diversion identified.            Romana Perez MD

## 2021-09-17 NOTE — PROGRESS NOTES
After obtaining consent, and per orders of Dr. Libra Anders, injection of Influenza Vaccine given in Left deltoid by Todd Damon MA. Patient instructed to remain in clinic for 20 minutes afterwards, and to report any adverse reaction to me immediately.

## 2021-09-20 ENCOUNTER — TELEPHONE (OUTPATIENT)
Dept: ENDOCRINOLOGY | Age: 61
End: 2021-09-20

## 2021-09-20 NOTE — TELEPHONE ENCOUNTER
Patient states that when she took her bs this morning it was 335. She is not sure what she should do. Please advise. Thanks!

## 2021-09-23 ASSESSMENT — ENCOUNTER SYMPTOMS
ABDOMINAL DISTENTION: 0
BACK PAIN: 0
SHORTNESS OF BREATH: 0
CONSTIPATION: 1
COUGH: 0

## 2021-09-27 ENCOUNTER — OFFICE VISIT (OUTPATIENT)
Dept: ENDOCRINOLOGY | Age: 61
End: 2021-09-27
Payer: MEDICARE

## 2021-09-27 VITALS
DIASTOLIC BLOOD PRESSURE: 54 MMHG | WEIGHT: 223 LBS | HEART RATE: 71 BPM | SYSTOLIC BLOOD PRESSURE: 90 MMHG | BODY MASS INDEX: 35 KG/M2 | OXYGEN SATURATION: 97 % | HEIGHT: 67 IN

## 2021-09-27 DIAGNOSIS — E11.65 UNCONTROLLED TYPE 2 DIABETES MELLITUS WITH HYPERGLYCEMIA (HCC): Primary | ICD-10-CM

## 2021-09-27 LAB
CHP ED QC CHECK: NORMAL
GLUCOSE BLD-MCNC: 384 MG/DL

## 2021-09-27 PROCEDURE — G8427 DOCREV CUR MEDS BY ELIG CLIN: HCPCS | Performed by: INTERNAL MEDICINE

## 2021-09-27 PROCEDURE — G8417 CALC BMI ABV UP PARAM F/U: HCPCS | Performed by: INTERNAL MEDICINE

## 2021-09-27 PROCEDURE — 3017F COLORECTAL CA SCREEN DOC REV: CPT | Performed by: INTERNAL MEDICINE

## 2021-09-27 PROCEDURE — 99213 OFFICE O/P EST LOW 20 MIN: CPT | Performed by: INTERNAL MEDICINE

## 2021-09-27 PROCEDURE — 3044F HG A1C LEVEL LT 7.0%: CPT | Performed by: INTERNAL MEDICINE

## 2021-09-27 PROCEDURE — 2022F DILAT RTA XM EVC RTNOPTHY: CPT | Performed by: INTERNAL MEDICINE

## 2021-09-27 PROCEDURE — 1036F TOBACCO NON-USER: CPT | Performed by: INTERNAL MEDICINE

## 2021-09-27 PROCEDURE — 82962 GLUCOSE BLOOD TEST: CPT | Performed by: INTERNAL MEDICINE

## 2021-09-27 ASSESSMENT — ENCOUNTER SYMPTOMS
COLOR CHANGE: 1
EYES NEGATIVE: 1
RESPIRATORY NEGATIVE: 1

## 2021-09-27 NOTE — PROGRESS NOTES
9/27/2021    Assessment:       Diagnosis Orders   1. Uncontrolled type 2 diabetes mellitus with hyperglycemia (HCC)  POCT Glucose    Hemoglobin S5Y    Basic Metabolic Panel, Fasting         PLAN:     Orders Placed This Encounter   Procedures    Hemoglobin A1C     Standing Status:   Future     Standing Expiration Date:   9/27/2022    Basic Metabolic Panel, Fasting     Standing Status:   Future     Standing Expiration Date:   9/27/2022    POCT Glucose     Continue Lantus 55 units at bedtime NovoLog sliding scale A1c goal of 7 or lower increase slightly water intake blood pressure target 203-2 10 systolic      Orders Placed This Encounter   Procedures    POCT Glucose       Subjective:     Chief Complaint   Patient presents with    Diabetes     Vitals:    09/27/21 1403 09/27/21 1406   BP: (!) 91/57 (!) 90/54   Pulse: 71    SpO2: 97%    Weight: 223 lb (101.2 kg)    Height: 5' 7\" (1.702 m)      Wt Readings from Last 3 Encounters:   09/27/21 223 lb (101.2 kg)   09/17/21 217 lb 12.8 oz (98.8 kg)   09/10/21 212 lb (96.2 kg)     BP Readings from Last 3 Encounters:   09/27/21 (!) 90/54   09/17/21 (!) 88/58   09/10/21 128/62     Follow-up on type 2 diabetes complications include end-stage renal disease on dialysis blood pressure has been slightly low patient also sees cardiologist glucose was high currently on Lantus 50 5 at night NovoLog sliding scale up to 8 units based on her blood sugar testing 4 times daily denies any hypoglycemia    Diabetes  She presents for her follow-up diabetic visit. She has type 2 diabetes mellitus. Her disease course has been fluctuating. Associated symptoms include fatigue. Diabetic complications include heart disease and nephropathy. Risk factors for coronary artery disease include obesity. Current diabetic treatment includes insulin injections. She is currently taking insulin pre-breakfast, pre-lunch, pre-dinner and at bedtime. Her overall blood glucose range is 140-180 mg/dl.  (Lab Results       Component                Value               Date                       LABA1C                   6.2 (H)             08/18/2021                   )     Past Medical History:   Diagnosis Date    Anxiety     CAD S/P percutaneous coronary angioplasty 2015, 2018    stents per dr Jess Stinson    CHF (congestive heart failure) ([address]     CKD (chronic kidney disease) stage 4, GFR 15-29 ml/min ([address] 2/24/2018    CKD stage 4 due to type 2 diabetes mellitus ([address]     COPD (chronic obstructive pulmonary disease) ([address]     Diabetic nephropathy with proteinuria ([address] 2014    DJD (degenerative joint disease) of knee     Dr Lisa Thomas GERD (gastroesophageal reflux disease)     Hemiparesis, left ([address] 2013    entered Assisted Living ([facility])    Hemodialysis patient [facility])     History of heart failure     History of seizures     History of type C viral hepatitis     HTN (hypertension)     Hyperlipidemia     Impaired mobility and activities of daily living     Mediastinal lymphadenopathy 2013    Dr Sohail Mendoza, [place] [facility]    Metabolic syndrome     Moderate persistent asthma without complication 4/09/0219    Need for extended care facility 7/7/2021    Neurogenic urinary incontinence 2013    Neuropathy in diabetes [facility])     Nonrheumatic mitral valve regurgitation 7/7/2021    Nonrheumatic tricuspid valve regurgitation 7/7/2021    Obesity (BMI 30-39. 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 Joey Campbell      x1 Dr. Mariana Rivera, Dr Raymond Welch  08/10/2021    [facility]    DIAGNOSTIC CARDIAC CATH LAB PROCEDURE  10/02/2019    HYSTERECTOMY, TOTAL ABDOMINAL      one ovary intact, Dr April Irene, menorrhagia    DC TOTAL KNEE ARTHROPLASTY Left 2018    LEFT KNEE TOTAL KNEE ARTHROPLASTY, SHAYNA, NERVE BLOCK performed by Sarah Ramirez MD at [address] [place] [address] [facility]      TOE AMPUTATION Right     TOTAL KNEE ARTHROPLASTY  16    Dr Conrado Cam TUNNELED [address] Right 2020    tunneled HD catheter per Dr Javed Pickard Marital status:      Spouse name: Not on file    Number of children: 2    Years of education: Not on file    Highest education level: Not on file   Occupational History    Occupation: disabled   Tobacco Use    Smoking status: Passive Smoke Exposure - Never Smoker    Smokeless tobacco: Never Used   Vaping Use    Vaping Use: Never used   Substance and Sexual Activity    Alcohol use: No     Alcohol/week: 0.0 standard drinks    Drug use: No    Sexual activity: Not Currently   Other Topics Concern    Not on file   Social History Narrative    Born in [place], one of 5    Twin sister Reyes, very ill in , 2019    Moved to [facility], , 2 children, one son and one daughter    Worked at [employer], as a nurse's aide    Disabled due to mental illness    Lived at [facility], was discharged, returned to independent living in 2017 in the daughter's house and has adjusted well    One son and one daughter, live in the same house with patient, Kori Parrish pays the rent    Hobbies reading (misteries)     Social Determinants of Health     Financial Resource Strain: Low Risk  Difficulty of Paying Living Expenses: Not hard at all   Food Insecurity: No Food Insecurity    Worried About Running Out of Food in the Last Year: Never true    Ran Out of Food in the Last Year: Never true   Transportation Needs: No Transportation Needs    Lack of Transportation (Medical): No    Lack of Transportation (Non-Medical): No   Physical Activity:     Days of Exercise per Week:     Minutes of Exercise per Session:    Stress:     Feeling of Stress :    Social Connections:     Frequency of Communication with Friends and Family:     Frequency of Social Gatherings with Friends and Family:     Attends Voodoo Services:     Active Member of Clubs or Organizations:     Attends Club or Organization Meetings:     Marital Status:    Intimate Partner Violence:     Fear of Current or Ex-Partner:     Emotionally Abused:     Physically Abused:     Sexually Abused:      Family History   Problem Relation Age of Onset    Cancer Mother 76        survived   Kelli Diggs Hypertension Father     Diabetes Sister     Mental Illness Sister      Allergies   Allergen Reactions    Codeine Hives     hives    Oxycontin [Oxycodone Hcl] Hives       Current Outpatient Medications:     amLODIPine (NORVASC) 10 MG tablet, Take 10 mg by mouth daily Give 1 tablet by mouth one time a day every Tue, Thu, Sat for hypotension give prior to dialysis, Disp: , Rfl:     diphenoxylate-atropine (DIPHENATOL) 2.5-0.025 MG per tablet, Take 1 tablet by mouth 4 times daily as needed for Diarrhea for up to 10 days. , Disp: 40 tablet, Rfl: 0    zoster recombinant adjuvanted vaccine (SHINGRIX) 50 MCG/0.5ML SUSR injection, Inject 0.5 mLs into the muscle See Admin Instructions 1 dose now and repeat in 2-6 months, Disp: 0.5 mL, Rfl: 0    metoprolol tartrate (LOPRESSOR) 25 MG tablet, TAKE 1/2 TABLET BY MOUTH TWO TIMES DAILY , Disp: 90 tablet, Rfl: 4    midodrine (PROAMATINE) 5 MG tablet, Take 5 mg by mouth three times a week Give prior to dialysis. , Disp: , Rfl:     LANTUS SOLOSTAR 100 UNIT/ML injection pen, 55 units at bedtime, Disp: 10 pen, Rfl: 10    insulin aspart (NOVOLOG FLEXPEN) 100 UNIT/ML injection pen, INJECT 8-10  units with each meals (Patient taking differently: Inject 0-8 Units into the skin 3 times daily (before meals) And per sliding scale fo 0-150=0 units; 151-200= 2units; 201-250= 4 units; 251-300=6units; 301-350=8 units; 351-400=1Call MD/ CNP), Disp: 10 pen, Rfl: 3    melatonin 10 MG CAPS capsule, Take 1 capsule by mouth nightly, Disp: 30 capsule, Rfl: 12    aspirin EC 81 MG EC tablet, Take 1 tablet by mouth daily, Disp: 30 tablet, Rfl: 12    ARIPiprazole (ABILIFY) 5 MG tablet, TAKE 1 TABLET BY MOUTH ONCE DAILY, Disp: 30 tablet, Rfl: 2    magnesium oxide (MAG-OX) 400 MG tablet, Take 1 tablet by mouth daily, Disp: 90 tablet, Rfl: 4    atorvastatin (LIPITOR) 40 MG tablet, Take 1 tablet by mouth daily, Disp: 90 tablet, Rfl: 4    Insulin Pen Needle (SURE COMFORT PEN NEEDLES) 30G X 8 MM MISC, USE AS DIRECTED FIVE TIMES A DAILY, Disp: 100 each, Rfl: 10    blood glucose test strips (FREESTYLE LITE) strip, 1 each by Does not apply route 4 times daily (before meals and nightly) As needed. , Disp: 200 strip, Rfl: 5    FreeStyle Lancets MISC, Test 4x daily, Disp: 150 each, Rfl: 3    docusate sodium (COLACE, DULCOLAX) 100 MG CAPS, Take 100 mg by mouth 2 times daily For the prevention and treatment of constipation while taking pain medications. , Disp: 30 capsule, Rfl: 0    acetaminophen (TYLENOL) 325 MG tablet, Take 2 tablets by mouth every 4 hours as needed for Pain (For mild to moderate pain (Pain 1-6 out of 10 on pain scale)), Disp: 120 tablet, Rfl: 0    Misc.  Devices (BARIATRIC ROLLATOR) MISC, Rolllator with a basket, Disp: 1 each, Rfl: 0    Blood Glucose Monitoring Suppl (FREESTYLE LITE) CORETTA, 1 Device by Does not apply route daily as needed (Diabetes) Use freestyle meter to test blood sugar as needed, Disp: 1 Device, Rfl: 0    ranolazine (RANEXA) 500 MG extended release tablet, Take 1 tablet by mouth 2 times daily, Disp: 180 tablet, Rfl: 2    BRILINTA 90 MG TABS tablet, TAKE 1 TABLET BY MOUTH 2 TIMES DAILY, Disp: 60 tablet, Rfl: 11    pantoprazole (PROTONIX) 20 MG tablet, TAKE 1 TABLET BY MOUTH DAILY, Disp: 90 tablet, Rfl: 3    sertraline (ZOLOFT) 50 MG tablet, TAKE 1 TABLET BY MOUTH DAILY, Disp: 90 tablet, Rfl: 3    Misc.  Devices [facility], To use with transport outside of the house., Disp: 1 each, Rfl: 0    isosorbide mononitrate (IMDUR) 60 MG extended release tablet, Take 1 tablet by mouth daily (Patient taking differently: Take 120 mg by mouth daily ), Disp: 90 tablet, Rfl: 3    nitroGLYCERIN (NITROSTAT) 0.4 MG SL tablet, Place 1 tablet under the tongue every 5 minutes as needed for Chest pain, Disp: , Rfl:     Alcohol Swabs (EASY TOUCH ALCOHOL PREP MEDIUM) 70 % PADS, USE AS DIRECTED THREE TIMES A DAY (Patient not taking: Reported on 9/27/2021), Disp: 100 each, Rfl: 3  Lab Results   Component Value Date     08/18/2021    K 3.4 08/18/2021    CL 89 (L) 08/18/2021    CO2 29 08/18/2021    BUN 28 (H) 08/18/2021    CREATININE 4.51 (H) 08/18/2021    GLUCOSE 384 09/27/2021    CALCIUM 9.7 08/18/2021    PROT 6.9 08/01/2021    LABALBU 4.1 08/01/2021    BILITOT 0.5 08/01/2021    ALKPHOS 98 08/01/2021    AST 20 08/01/2021    ALT 15 08/01/2021    LABGLOM 9.8 (L) 08/18/2021    GFRAA 11.9 (L) 08/18/2021    GLOB 2.8 08/01/2021     Lab Results   Component Value Date    WBC 7.0 08/18/2021    HGB 10.6 (L) 08/18/2021    HCT 30.0 (L) 08/18/2021    MCV 92.6 08/18/2021     08/18/2021     Lab Results   Component Value Date    LABA1C 6.2 (H) 08/18/2021    LABA1C 6.6 (H) 07/16/2021    LABA1C 6.4 (H) 06/21/2021     Lab Results   Component Value Date    HDL 48 06/11/2020    HDL 44 05/25/2020    HDL 55 02/26/2019    LDLCALC 37 06/11/2020    LDLCALC 46 05/25/2020    LDLCALC 59 02/26/2019    CHOL 101 06/11/2020    CHOL 107 05/25/2020    CHOL 137 02/26/2019    TRIG 82 06/11/2020    TRIG 85 05/25/2020    TRIG 116 02/26/2019       Lab Results   Component Value Date    TSH 0.856 07/16/2021    TSH 0.454 06/28/2020    TSH 0.574 10/08/2017    TSHREFLEX 1.300 08/27/2019       Review of Systems   Constitutional: Positive for fatigue. Eyes: Negative. Respiratory: Negative. Cardiovascular: Negative. Skin: Positive for color change. Neurological: Negative. All other systems reviewed and are negative. Objective:   Physical Exam  Vitals reviewed. Constitutional:       Appearance: Normal appearance. She is obese. HENT:      Head: Normocephalic and atraumatic. Hair is normal.      Right Ear: External ear normal.      Left Ear: External ear normal.      Nose: Nose normal.      Mouth/Throat:     Eyes:      General: No scleral icterus. Right eye: No discharge. Left eye: No discharge. Extraocular Movements: Extraocular movements intact. Conjunctiva/sclera: Conjunctivae normal.   Neck:      Trachea: Trachea normal.   Cardiovascular:      Rate and Rhythm: Normal rate. Pulmonary:      Effort: Pulmonary effort is normal.   Abdominal:       Musculoskeletal:         General: Normal range of motion. Cervical back: Normal range of motion and neck supple. Neurological:      General: No focal deficit present. Mental Status: She is alert and oriented to person, place, and time.    Psychiatric:         Mood and Affect: Mood normal.         Behavior: Behavior normal.

## 2021-09-29 ENCOUNTER — CARE COORDINATION (OUTPATIENT)
Dept: CARE COORDINATION | Age: 61
End: 2021-09-29

## 2021-09-29 NOTE — LETTER
9/29/2021    Ivory Doherty  [address] [facility] [address]      Dear Ivory Doherty,    My name is Travis Alvarado RN and I am a registered nurse who partners with Delfino Lynch MD to improve patients' health. Delfino Lynch MD believes you would benefit from working with me. As a member of your health care team, I would work with other providers involved in your care, offer education for your specific health conditions, and connect you with additional resources as needed. I will collaborate with Delfino Lynch MD to support you in following your treatment plan. The additional support I provide is no additional cost to you. My primary focus is to help you achieve specific goals and improve your health. We are committed to walk with you on this journey and look forward to working with you. Please call me to further discuss your healthcare needs. I am available by phone or for appointments at the office. You can reach me at [phone].     In good health,     Travis Alvarado RN

## 2021-10-08 RX ORDER — INSULIN GLARGINE 100 [IU]/ML
INJECTION, SOLUTION SUBCUTANEOUS
Qty: 10 PEN | Refills: 10 | Status: SHIPPED | OUTPATIENT
Start: 2021-10-08 | End: 2022-08-17 | Stop reason: SDUPTHER

## 2021-10-11 ENCOUNTER — CARE COORDINATION (OUTPATIENT)
Dept: CARE COORDINATION | Age: 61
End: 2021-10-11

## 2021-10-11 ENCOUNTER — TELEPHONE (OUTPATIENT)
Dept: CARDIOLOGY CLINIC | Age: 61
End: 2021-10-11

## 2021-10-11 ENCOUNTER — HOSPITAL ENCOUNTER (EMERGENCY)
Age: 61
Discharge: HOME | End: 2021-10-12
Payer: MEDICARE

## 2021-10-11 ENCOUNTER — TELEPHONE (OUTPATIENT)
Dept: FAMILY MEDICINE CLINIC | Age: 61
End: 2021-10-11

## 2021-10-11 ENCOUNTER — APPOINTMENT (OUTPATIENT)
Dept: CT IMAGING | Age: 61
End: 2021-10-11
Payer: MEDICARE

## 2021-10-11 DIAGNOSIS — M54.50 ACUTE LEFT-SIDED LOW BACK PAIN, UNSPECIFIED WHETHER SCIATICA PRESENT: ICD-10-CM

## 2021-10-11 DIAGNOSIS — E11.65 TYPE 2 DIABETES MELLITUS WITH HYPERGLYCEMIA, UNSPECIFIED WHETHER LONG TERM INSULIN USE (HCC): ICD-10-CM

## 2021-10-11 DIAGNOSIS — S09.90XA INJURY OF HEAD, INITIAL ENCOUNTER: Primary | ICD-10-CM

## 2021-10-11 DIAGNOSIS — E87.1 HYPONATREMIA: ICD-10-CM

## 2021-10-11 DIAGNOSIS — N18.6 STAGE 5 CHRONIC KIDNEY DISEASE ON CHRONIC DIALYSIS (HCC): ICD-10-CM

## 2021-10-11 DIAGNOSIS — R07.81 RIB PAIN: ICD-10-CM

## 2021-10-11 DIAGNOSIS — M54.6 ACUTE LEFT-SIDED THORACIC BACK PAIN: ICD-10-CM

## 2021-10-11 DIAGNOSIS — S20.222A CONTUSION OF LEFT BACK WALL OF THORAX, INITIAL ENCOUNTER: ICD-10-CM

## 2021-10-11 DIAGNOSIS — Z99.2 STAGE 5 CHRONIC KIDNEY DISEASE ON CHRONIC DIALYSIS (HCC): ICD-10-CM

## 2021-10-11 LAB
ALBUMIN SERPL-MCNC: 3.9 G/DL (ref 3.5–4.6)
ALP BLD-CCNC: 111 U/L (ref 40–130)
ALT SERPL-CCNC: 16 U/L (ref 0–33)
ANION GAP SERPL CALCULATED.3IONS-SCNC: 16 MEQ/L (ref 9–15)
APTT: 28 SEC (ref 24.4–36.8)
AST SERPL-CCNC: 20 U/L (ref 0–35)
BASOPHILS ABSOLUTE: 0.1 K/UL (ref 0–0.2)
BASOPHILS RELATIVE PERCENT: 0.6 %
BILIRUB SERPL-MCNC: 0.5 MG/DL (ref 0.2–0.7)
BUN BLDV-MCNC: 52 MG/DL (ref 8–23)
CALCIUM SERPL-MCNC: 9.3 MG/DL (ref 8.5–9.9)
CHLORIDE BLD-SCNC: 86 MEQ/L (ref 95–107)
CO2: 23 MEQ/L (ref 20–31)
CREAT SERPL-MCNC: 5.66 MG/DL (ref 0.5–0.9)
EOSINOPHILS ABSOLUTE: 0.3 K/UL (ref 0–0.7)
EOSINOPHILS RELATIVE PERCENT: 3.1 %
ETHANOL PERCENT: NORMAL G/DL
ETHANOL: <10 MG/DL (ref 0–0.08)
GFR AFRICAN AMERICAN: 9.1
GFR NON-AFRICAN AMERICAN: 7.6
GLOBULIN: 3.1 G/DL (ref 2.3–3.5)
GLUCOSE BLD-MCNC: 320 MG/DL (ref 70–99)
HCT VFR BLD CALC: 27.6 % (ref 37–47)
HEMOGLOBIN: 9.7 G/DL (ref 12–16)
INR BLD: 1.1
LYMPHOCYTES ABSOLUTE: 1.6 K/UL (ref 1–4.8)
LYMPHOCYTES RELATIVE PERCENT: 17.8 %
MCH RBC QN AUTO: 32.4 PG (ref 27–31.3)
MCHC RBC AUTO-ENTMCNC: 35 % (ref 33–37)
MCV RBC AUTO: 92.6 FL (ref 82–100)
MONOCYTES ABSOLUTE: 0.7 K/UL (ref 0.2–0.8)
MONOCYTES RELATIVE PERCENT: 8 %
NEUTROPHILS ABSOLUTE: 6.5 K/UL (ref 1.4–6.5)
NEUTROPHILS RELATIVE PERCENT: 70.5 %
PDW BLD-RTO: 16 % (ref 11.5–14.5)
PLATELET # BLD: 184 K/UL (ref 130–400)
POC CREATININE WHOLE BLOOD: 6.6
POTASSIUM SERPL-SCNC: 4.1 MEQ/L (ref 3.4–4.9)
PROTHROMBIN TIME: 14.5 SEC (ref 12.3–14.9)
RBC # BLD: 2.98 M/UL (ref 4.2–5.4)
SODIUM BLD-SCNC: 125 MEQ/L (ref 135–144)
TOTAL PROTEIN: 7 G/DL (ref 6.3–8)
WBC # BLD: 9.2 K/UL (ref 4.8–10.8)

## 2021-10-11 PROCEDURE — 80053 COMPREHEN METABOLIC PANEL: CPT

## 2021-10-11 PROCEDURE — 6830039000 HC L3 TRAUMA ALERT

## 2021-10-11 PROCEDURE — 85730 THROMBOPLASTIN TIME PARTIAL: CPT

## 2021-10-11 PROCEDURE — 85025 COMPLETE CBC W/AUTO DIFF WBC: CPT

## 2021-10-11 PROCEDURE — 70450 CT HEAD/BRAIN W/O DYE: CPT

## 2021-10-11 PROCEDURE — 99284 EMERGENCY DEPT VISIT MOD MDM: CPT

## 2021-10-11 PROCEDURE — 82077 ASSAY SPEC XCP UR&BREATH IA: CPT

## 2021-10-11 PROCEDURE — 36415 COLL VENOUS BLD VENIPUNCTURE: CPT

## 2021-10-11 PROCEDURE — 85610 PROTHROMBIN TIME: CPT

## 2021-10-11 PROCEDURE — 71250 CT THORAX DX C-: CPT

## 2021-10-11 PROCEDURE — 72125 CT NECK SPINE W/O DYE: CPT

## 2021-10-11 PROCEDURE — 74176 CT ABD & PELVIS W/O CONTRAST: CPT

## 2021-10-11 RX ORDER — CHOLECALCIFEROL (VITAMIN D3) 10 MCG
1 TABLET ORAL DAILY
COMMUNITY
End: 2022-07-22

## 2021-10-11 RX ORDER — SITAGLIPTIN 50 MG/1
TABLET, FILM COATED ORAL
COMMUNITY
Start: 2021-10-03 | End: 2021-12-13

## 2021-10-11 RX ORDER — PREGABALIN 75 MG/1
75 CAPSULE ORAL 2 TIMES DAILY
COMMUNITY
End: 2021-10-22 | Stop reason: SDUPTHER

## 2021-10-11 ASSESSMENT — PAIN SCALES - GENERAL: PAINLEVEL_OUTOF10: 7

## 2021-10-11 NOTE — TELEPHONE ENCOUNTER
[facility] is calling in to check on the status of a 485 they faxed over to us back on aug 31st please advise

## 2021-10-11 NOTE — TELEPHONE ENCOUNTER
Patient calling. She has been told when she is in dialysis her SBP drops under 100. She has been feeling dizzy as well. Next appt not until 12/2021.

## 2021-10-11 NOTE — CARE COORDINATION
Ambulatory Care Coordination Note  10/11/2021  CM Risk Score: 17  Charlson 10 Year Mortality Risk Score: 100%     ACC: Zayra Preston, RN    Summary Note: ACM spoke to patient and daughter. ACM introduced care coordination program role of ACM goals and benefits. They are in agreement to engage with [facility] program.  ACM completed initial assessment SDOH, medication review. Daughter agreed to nutritional referral, ACM sent inbox message to coworker. Patient has diabetes COPD and CHF ACM will assist with patient education. Patient is also risk for falls. ACM sent welcome letter with COPD CHF and DM ZONES. Plan of care  Chronic condition education, nutritional eval and education. Additional resources, potential palliative care referral, ACP planning. Patient also is requesting evaluation for her itch and picking of skin. Per daughter patient is always scratching and bleeding on her arms. ACM will attempt to contact either PCP or patient's nephrologist regarding concerns. Ambulatory Care Coordination Assessment    Care Coordination Protocol  Program Enrollment: Complex Care  Referral from Primary Care Provider: No  Week 1 - Initial Assessment     Do you have all of your prescriptions and are they filled?: Yes  Barriers to medication adherence: None  Are you able to afford your medications?: Yes  How often do you have trouble taking your medications the way you have been told to take them?: I always take them as prescribed. Do you have Home O2 Therapy?: No      Ability to seek help/take action for Emergent Urgent situations i.e. fire, crime, inclement weather or health crisis. : Independent  Ability to ambulate to restroom: Independent  Ability handle personal hygeine needs (bathing/dressing/grooming): Needs Assistance  Ability to manage Medications: Needs Assistance  Ability to prepare Food Preparation: Needs Assistance  Ability to maintain home (clean home, laundry): Dependent  Ability to drive and/or has How do daily activities impact on the patient's well-being? (include current or anticipated unemployment, work, caregiving, access to transportation or other): Contributes to low mood or stress at times   How would you rate their social network (family, work, friends)?: Adequate participation with social networks   How would you rate their financial resources (including ability to afford all required medical care)?: Financially secure, some resource challenges   How wells does the patient now understand their health and well-being (symptoms, signs or risk factors) and what they need to do to manage their health?: Reasonable to good understanding and already engages in managing health or is willing to undertake better management   How well do you think your patient can engage in healthcare discussions? (Barriers include language, deafness, aphasia, alcohol or drug problems, learning difficulties, concentration): Adequate communication, with or without minor barriers   Do other services need to be involved to help this patient?: Other care/services in place and adequate   Are current services involved with this patient well-coordinated? (Include coordination with other services you are now recommendation): All required care/services in place and well-coordinated   Suggested Interventions and Community Resources  Fall Risk Prevention: Not Started Home Care Waiver: Completed   Palliative Care: Not Started   Pharmacist: Declined   Physical Therapy: Completed   Registered Dietician: In Process   Other Services: Completed   Zone Management Tools: Completed         Schedule an appointment with the patient's PCP, Set up/Review an Education Plan, Set up/Review Goals              Prior to Admission medications    Medication Sig Start Date End Date Taking?  Authorizing Provider   JANUVIA 50 MG tablet TAKE 1 TABLET BY MOUTH EVERY DAY 10/3/21  Yes Sherren Mayans, DO b complex-[place]-folic acid (NEPHROCAPS) 1 MG capsule Take 1 capsule by mouth daily   Yes Historical Provider, MD   pregabalin (LYRICA) 75 MG capsule Take 75 mg by mouth 2 times daily. Yes Historical Provider, MD   LANTUS SOLOSTAR 100 UNIT/ML injection pen 55 units at bedtime 10/8/21  Yes Marj Mccarthy MD   sertraline (ZOLOFT) 50 MG tablet TAKE 1 TABLET BY MOUTH DAILY 10/4/21  Yes Eunice Arana MD   metoprolol tartrate (LOPRESSOR) 25 MG tablet TAKE 1/2 TABLET BY MOUTH TWO TIMES DAILY  9/17/21  Yes Eunice Arana MD   insulin aspart (NOVOLOG FLEXPEN) 100 UNIT/ML injection pen INJECT 8-10  units with each meals  Patient taking differently: Inject 0-8 Units into the skin 3 times daily (before meals) And per sliding scale fo 0-150=0 units; 151-200= 2units; 201-250= 4 units; 251-300=6units; 301-350=8 units; 351-400=1Call MD/  CNP 6/28/21  Yes Marj Mccarthy MD   melatonin 10 MG CAPS capsule Take 1 capsule by mouth nightly 6/23/21  Yes Eunice Arana MD   aspirin EC 81 MG EC tablet Take 1 tablet by mouth daily 5/25/21  Yes Eunice Arana MD   ARIPiprazole (ABILIFY) 5 MG tablet TAKE 1 TABLET BY MOUTH ONCE DAILY 5/19/21  Yes Eunice Arana MD   magnesium oxide (MAG-OX) 400 MG tablet Take 1 tablet by mouth daily 4/28/21  Yes Eunice Arana MD   atorvastatin (LIPITOR) 40 MG tablet Take 1 tablet by mouth daily 4/28/21  Yes Eunice Arana MD   Insulin Pen Needle (SURE COMFORT PEN NEEDLES) 30G X 8 MM MISC USE AS DIRECTED FIVE TIMES A DAILY 4/20/21  Yes MARCELO Terrell   blood glucose test strips (FREESTYLE LITE) strip 1 each by Does not apply route 4 times daily (before meals and nightly) As needed.  3/12/21  Yes MARCELO Terrell   Alcohol Swabs (EASY TOUCH ALCOHOL PREP MEDIUM) 70 % PADS USE AS DIRECTED THREE TIMES A DAY 3/12/21  Yes MARCELO Terrell   FreeStyle Lancets MISC Test 4x daily 3/11/21  Yes MARCELO Terrell   docusate sodium (COLACE, DULCOLAX) 100 MG CAPS Take 100 mg by mouth 2 5/10/2022 12:00 PM Kathryn Horn MD [address] [place] [address]

## 2021-10-12 ENCOUNTER — CARE COORDINATION (OUTPATIENT)
Dept: CARE COORDINATION | Age: 61
End: 2021-10-12

## 2021-10-12 VITALS
TEMPERATURE: 98.5 F | OXYGEN SATURATION: 97 % | WEIGHT: 223 LBS | RESPIRATION RATE: 18 BRPM | HEART RATE: 65 BPM | HEIGHT: 67 IN | BODY MASS INDEX: 35 KG/M2 | DIASTOLIC BLOOD PRESSURE: 45 MMHG | SYSTOLIC BLOOD PRESSURE: 115 MMHG

## 2021-10-12 LAB
EKG ATRIAL RATE: 65 BPM
EKG P AXIS: 78 DEGREES
EKG P-R INTERVAL: 272 MS
EKG Q-T INTERVAL: 486 MS
EKG QRS DURATION: 140 MS
EKG QTC CALCULATION (BAZETT): 505 MS
EKG R AXIS: -52 DEGREES
EKG T AXIS: 61 DEGREES
EKG VENTRICULAR RATE: 65 BPM

## 2021-10-12 PROCEDURE — 93005 ELECTROCARDIOGRAM TRACING: CPT | Performed by: PHYSICIAN ASSISTANT

## 2021-10-12 PROCEDURE — 2580000003 HC RX 258: Performed by: PHYSICIAN ASSISTANT

## 2021-10-12 PROCEDURE — 93010 ELECTROCARDIOGRAM REPORT: CPT | Performed by: INTERNAL MEDICINE

## 2021-10-12 RX ORDER — 0.9 % SODIUM CHLORIDE 0.9 %
250 INTRAVENOUS SOLUTION INTRAVENOUS ONCE
Status: COMPLETED | OUTPATIENT
Start: 2021-10-12 | End: 2021-10-12

## 2021-10-12 RX ADMIN — SODIUM CHLORIDE 250 ML: 9 INJECTION, SOLUTION INTRAVENOUS at 01:29

## 2021-10-12 ASSESSMENT — ENCOUNTER SYMPTOMS
BACK PAIN: 0
COUGH: 0
ANAL BLEEDING: 0
COLOR CHANGE: 1
SHORTNESS OF BREATH: 0
EYE DISCHARGE: 0
VOMITING: 0
ABDOMINAL PAIN: 0
VOICE CHANGE: 0
ABDOMINAL DISTENTION: 0
NAUSEA: 0
PHOTOPHOBIA: 0
APNEA: 0

## 2021-10-12 NOTE — ED TRIAGE NOTES
Pt presents by EMS after fall from standing today. She states he knee gave out when she was walking with her walker, which has happened before. She fell over and hit her head on a refrigerator. There is no obvious trauma noted on the head. Pt is alert and oriented and denies loss of consciousness. She is also complaining of chest pain that started after the fall. Unclear if the chest pain is related to the fall.  Pt took Nitro x 3 before EMS arrival.

## 2021-10-12 NOTE — ED NOTES
Braxton ROBERTSON at bedside with explanation of results and possible discharge. Discharge education reviewed. Patient instructed to follow up with PCP/nephrologist and come back to the ED with any new or worsening symptoms. No questions or concerns at this time. Pt up and ambulating with walker.        Duane Torres RN  10/12/21 8218

## 2021-10-12 NOTE — CARE COORDINATION
Supriya Clinton  October 12, 2021    Initial Referral Reason: DM, CHF, COPD and ESRD on HD    Patient Care Team:  Grace Riley MD as PCP - General (Family Medicine)  Grace Riley MD as PCP - [facility] Provider  Nohelia Desai MD (Urology)  Kelli Jones MD as Cardiologist (Interventional Cardiology)  Delmi Lr RN as Ambulatory Care Manager  Delmi Lr RN as Ambulatory Care Manager  Brannon Bustillo RD, LD as Dietitian (Dietitian)    Past Medical History:    Current Outpatient Medications   Medication Sig Dispense Refill    JANUVIA 50 MG tablet TAKE 1 TABLET BY MOUTH EVERY DAY      b complex-C-folic acid (NEPHROCAPS) 1 MG capsule Take 1 capsule by mouth daily      pregabalin (LYRICA) 75 MG capsule Take 75 mg by mouth 2 times daily.  LANTUS SOLOSTAR 100 UNIT/ML injection pen 55 units at bedtime 10 pen 10    sertraline (ZOLOFT) 50 MG tablet TAKE 1 TABLET BY MOUTH DAILY 30 tablet 5    zoster recombinant adjuvanted vaccine (SHINGRIX) 50 MCG/0.5ML SUSR injection Inject 0.5 mLs into the muscle See Admin Instructions 1 dose now and repeat in 2-6 months 0.5 mL 0    metoprolol tartrate (LOPRESSOR) 25 MG tablet TAKE 1/2 TABLET BY MOUTH TWO TIMES DAILY  90 tablet 4    midodrine (PROAMATINE) 5 MG tablet Take 5 mg by mouth three times a week Give prior to dialysis.       insulin aspart (NOVOLOG FLEXPEN) 100 UNIT/ML injection pen INJECT 8-10  units with each meals (Patient taking differently: Inject 0-8 Units into the skin 3 times daily (before meals) And per sliding scale fo 0-150=0 units; 151-200= 2units; 201-250= 4 units; 251-300=6units; 301-350=8 units; 351-400=1Call MD/  CNP) 10 pen 3    melatonin 10 MG CAPS capsule Take 1 capsule by mouth nightly 30 capsule 12    aspirin EC 81 MG EC tablet Take 1 tablet by mouth daily 30 tablet 12    ARIPiprazole (ABILIFY) 5 MG tablet TAKE 1 TABLET BY MOUTH ONCE DAILY 30 tablet 2    magnesium oxide (MAG-OX) 400 MG tablet Take 1 tablet by mouth daily 90 tablet 4    atorvastatin (LIPITOR) 40 MG tablet Take 1 tablet by mouth daily 90 tablet 4    Insulin Pen Needle (SURE COMFORT PEN NEEDLES) 30G X 8 MM MISC USE AS DIRECTED FIVE TIMES A DAILY 100 each 10    blood glucose test strips (FREESTYLE LITE) strip 1 each by Does not apply route 4 times daily (before meals and nightly) As needed. 200 strip 5    Alcohol Swabs (EASY TOUCH ALCOHOL PREP MEDIUM) 70 % PADS USE AS DIRECTED THREE TIMES A  each 3    FreeStyle Lancets MISC Test 4x daily 150 each 3    docusate sodium (COLACE, DULCOLAX) 100 MG CAPS Take 100 mg by mouth 2 times daily For the prevention and treatment of constipation while taking pain medications. 30 capsule 0    acetaminophen (TYLENOL) 325 MG tablet Take 2 tablets by mouth every 4 hours as needed for Pain (For mild to moderate pain (Pain 1-6 out of 10 on pain scale)) 120 tablet 0    Blood Glucose Monitoring Suppl (FREESTYLE LITE) CORETTA 1 Device by Does not apply route daily as needed (Diabetes) Use freestyle meter to test blood sugar as needed 1 Device 0    ranolazine (RANEXA) 500 MG extended release tablet Take 1 tablet by mouth 2 times daily 180 tablet 2    BRILINTA 90 MG TABS tablet TAKE 1 TABLET BY MOUTH 2 TIMES DAILY 60 tablet 11    pantoprazole (PROTONIX) 20 MG tablet TAKE 1 TABLET BY MOUTH DAILY 90 tablet 3    isosorbide mononitrate (IMDUR) 60 MG extended release tablet Take 1 tablet by mouth daily (Patient taking differently: Take 120 mg by mouth daily ) 90 tablet 3    nitroGLYCERIN (NITROSTAT) 0.4 MG SL tablet Place 1 tablet under the tongue every 5 minutes as needed for Chest pain       No current facility-administered medications for this visit.        Biochemical Data, Medical Tests and Procedures:    Lab Results   Component Value Date    LABA1C 6.2 (H) 08/18/2021     Lab Results   Component Value Date     09/04/2020       Lab Results   Component Value Date    CHOL 101 06/11/2020    CHOL 107 05/25/2020    CHOL 137 02/26/2019     Lab Results   Component Value Date    TRIG 82 06/11/2020    TRIG 85 05/25/2020    TRIG 116 02/26/2019     Lab Results   Component Value Date    HDL 48 06/11/2020    HDL 44 05/25/2020    HDL 55 02/26/2019     Lab Results   Component Value Date    LDLCALC 37 06/11/2020    LDLCALC 46 05/25/2020    LDLCALC 59 02/26/2019     Lab Results   Component Value Date    LABVLDL 59.0 05/04/2013     Lab Results   Component Value Date    CHOLHDLRATIO 4.32 01/30/2017       Lab Results   Component Value Date    WBC 9.2 10/11/2021    HGB 9.7 (L) 10/11/2021    HCT 27.6 (L) 10/11/2021    MCV 92.6 10/11/2021     10/11/2021       Lab Results   Component Value Date    CREATININE 5.66 (H) 10/11/2021    BUN 52 (H) 10/11/2021     (L) 10/11/2021    K 4.1 10/11/2021    CL 86 (L) 10/11/2021    CO2 23 10/11/2021         Anthropometric Measurements:    Height: 67 inches (170.2 cm)  Weight: 223 lb (101.2 kg)  BMI: 34.93 (obesity class I)   IBW: 135 lb (61.4 kg) +-10%  %IBW: 165.2%   AdBW: 170 lb (77.3 kg)     Physical Exam Findings:  Deferred    Nutrition Interview: RN noted to contact patient's daughter, Leia Leal. RD called Leia Leal, explained reason for call and role in care. Leia Leal explains patient \"eats a ton\", typically eats 3 meals with snacks. Leia Leal explains patient went to [facility] and bought a bag of 100 pieces of candy and ate 85-90 pieces within 24 hours- yesterday pt's  mg/dL and Leia Leal took her to the ER. Leia Leal states this AM pt's  mg/dL and it usually runs around 230 mg/dL. RD noted patient's A1C 6.2% as of 8/18/21. RD explained the importance of eating balanced meals and snacks consistently throughout the day to help manage BS. Explained the components of a balanced meal using the MyPlate [id number]-7/5 plate fruits and/or vegetables, 1/4 plate protein and 1/4 plate starchy carbohydrates with 8 oz glass of low fat milk if desired.  Discussed the importance of high quality protein (lean beef, pork, poultry, fish, eggs) and avoiding processed meats (hot dogs, sausage, mcmanus, deli meat, etc). RD explained the importance of watching portion sizes. Desiree Culver explained patient will eat a whole carton of strawberries in one sitting- RD discussed dividing the carton into serving sizes using baggies to help patient with portion control. Desiree Culver explained patient is on a fluid restriction of 40 ounces Tuesday-Friday and 32 ounces Saturday-Monday. Desiree Culver states patient is not following fluid restriction and is drinking \"nonstop. \" RD discussed a fluid restricted diet and the importance of following fluid restriction closely. Paulina verbalizes understanding. RD offered to mail educational handouts to pt to reinforce concepts discussed during phone conversation, Desiree Culver accepted and very appreciative. 24-Hour Diet Recall  Breakfast  Consumed: bowl of oatmeal and coffee; cereal with coffee; bagel with peanut butter and coffee; [employer] toast sticks     Lunch  Consumed: no example provided    Dinner  Consumed: 2 hamburgers with sides    Nutrition Diagnosis:  #1 Problem Limited adherence to nutrition-related recommendations       Etiology related to lack of value for behavior change       Signs/Symptoms as evidenced by conversation with pt's daughter, Desiree Culver and nutrition related lab values     Nutrition Intervention:     Estimated Needs  cardiac diet, diabetic diet and renal diet providing [id number] kcals to promote wt maintenance (30-35 kcal/kg due to ESRD on HD based on AdBW for obesity class I). Estimated daily CHO Needs: 316-370 g (based on 45-65% total calorie intake)  Estimated daily Protein Needs:  g (based on 1.2-1.5 g/kg based on AdBW)  Estimated daily Fluid Needs: 32-40 ounces per pt's daughter, Desiree Culver     #1 Nutrition Information: Provided patient with Dialysis Grocery List, Managing Blood Sugar handouts. For reinforcement of concepts discussed during nutrition interview.      #2 Nutrition Counseling: Used open-ended questions to assess patients perceived susceptibility and severity of disease state. Discussed potential impact of health threat on patient's lifestyle. Used open-ended questions to assess patient's perception regarding benefits of and barriers to implementation of nutrition therapy. #3 Nutrition Education: Clearly defined the benefits of nutrition therapy. Summarized and affirmed positive aspects of current nutrition patterns. Provided education regarding value of adherence to cardiac diet, diabetic diet and renal diet. Discussed ways to establish applying concepts of alternatives and choices regarding implementation of diet. Explored ideas for small, incremental goals to initiate behavior change. Monitoring and Evaluation:    Indicator/Goal Criteria   #1 Eat balanced meals consistently throughout the day. #1 Focus on making meals balanced using the MyPlate [id number]-3/5 plate fruits and/or vegetables, 1/4 plate protein and 1/4 plate starchy carbohydrates with 8 oz glass of low fat milk if desired. #2 Watch portion sizes and moderation is key. #2 Pay close attention to portion sizes. Follow Up: RD will call pt in 2 weeks to follow up and make sure pt received handouts in mail. RD will answer any nutrition related questions at this time.      [address] [facility]

## 2021-10-12 NOTE — ED PROVIDER NOTES
[address] [facility] ED  eMERGENCY dEPARTMENT eNCOUnter      Pt Name: Darry Frankel  MRN: [id number]  [place] 1960  Date of evaluation: 10/11/2021  Provider: Joshua Castellon Kamas Erin       Chief Complaint   Patient presents with    Fall         HISTORY OF PRESENT ILLNESS   (Location/Symptom, Timing/Onset,Context/Setting, Quality, Duration, Modifying Factors, Severity)  Note limiting factors. Darry Frankel is a 59 y.o. female who presents to the emergency department patient had fall states she was walking with her walker had knee give out fell she has headache denies loss of conscious denies difficulty seeing hearing or speaking she does have some neck pain. Does have some rib pain upper. All this started after a fall patient denies abdominal pain back pain. Denies nausea vomiting denies shortness of breath. HPI    NursingNotes were reviewed. REVIEW OF SYSTEMS    (2-9 systems for level 4, 10 or more for level 5)     Review of Systems   Constitutional: Negative for activity change, appetite change, fever and unexpected weight change. HENT: Negative for ear discharge, nosebleeds and voice change. Eyes: Negative for photophobia and discharge. Respiratory: Negative for apnea, cough and shortness of breath. Cardiovascular: Negative for palpitations. Gastrointestinal: Negative for abdominal distention, abdominal pain, anal bleeding, nausea and vomiting. Endocrine: Negative for cold intolerance, heat intolerance and polyphagia. Genitourinary: Negative for dysuria and genital sores. Musculoskeletal: Positive for arthralgias and neck pain. Negative for back pain and joint swelling. Skin: Positive for color change. Negative for pallor. Allergic/Immunologic: Negative for immunocompromised state. Neurological: Positive for headaches. Negative for seizures and facial asymmetry. Hematological: Does not bruise/bleed easily.    Psychiatric/Behavioral: Negative for behavioral problems, self-injury and sleep disturbance. All other systems reviewed and are negative. Except as noted above the remainder of the review of systems was reviewed and negative. PAST MEDICAL HISTORY     Past Medical History:   Diagnosis Date    Anxiety     CAD S/P percutaneous coronary angioplasty 2015, 2018    stents per dr Myron Kenny    CHF (congestive heart failure) ([address]     CKD (chronic kidney disease) stage 4, GFR 15-29 ml/min ([address] 2/24/2018    CKD stage 4 due to type 2 diabetes mellitus ([address]     COPD (chronic obstructive pulmonary disease) ([address]     Diabetic nephropathy with proteinuria ([address] 2014    DJD (degenerative joint disease) of knee     Dr Gordon Carlson GERD (gastroesophageal reflux disease)     Hemiparesis, left ([facility] 2013    [facility] Ass[facility] Living ([facility])    Hemodialysis patient [facility])     History of heart failure     History of seizures     History of type C viral hepatitis     HTN (hypertension)     Hyperlipidemia     Impaired mobility and activities of daily living     Mediastinal lymphadenopathy 2013    Satish Georges    Metabolic syndrome     Moderate persistent asthma without complication 5/57/3547    Need for extended care facility 7/7/2021    Neurogenic urinary incontinence 2013    Neuropathy in diabetes [facility])     Nonrheumatic mitral valve regurgitation 7/7/2021    Nonrheumatic tricuspid valve regurgitation 7/7/2021    Obesity (BMI 30-39. 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 Arguello Oh      x1 Dr. Yolande Yanez, Dr Héctor Smith  08/10/2021    [facility]    DIAGNOSTIC CARDIAC CATH LAB PROCEDURE  10/02/2019    HYSTERECTOMY, TOTAL ABDOMINAL      one ovary intact, Dr Jaiden Humphreys, menorrhagia    ID TOTAL KNEE ARTHROPLASTY Left 2018    LEFT KNEE TOTAL KNEE ARTHROPLASTY, SHAYNA, NERVE BLOCK performed by Breezy Dean MD at [address] [place] [address] [facility]      TOE AMPUTATION Right     TOTAL KNEE ARTHROPLASTY  16    Dr Chrales Carmona TUNNELED [address] Right 2020    tunneled HD catheter per Dr Sylvain Daniels       Previous Medications    ACETAMINOPHEN (TYLENOL) 325 MG TABLET    Take 2 tablets by mouth every 4 hours as needed for Pain (For mild to moderate pain (Pain 1-6 out of 10 on pain scale))    ALCOHOL SWABS (EASY TOUCH ALCOHOL PREP MEDIUM) 70 % PADS    USE AS DIRECTED THREE TIMES A DAY    ARIPIPRAZOLE (ABILIFY) 5 MG TABLET    TAKE 1 TABLET BY MOUTH ONCE DAILY    ASPIRIN EC 81 MG EC TABLET    Take 1 tablet by mouth daily    ATORVASTATIN (LIPITOR) 40 MG TABLET    Take 1 tablet by mouth daily    B COMPLEX-C-FOLIC ACID (NEPHROCAPS) 1 MG CAPSULE    Take 1 capsule by mouth daily    BLOOD GLUCOSE MONITORING SUPPL (FREESTYLE LITE) CORETTA    1 Device by Does not apply route daily as needed (Diabetes) Use freestyle meter to test blood sugar as needed    BLOOD GLUCOSE TEST STRIPS (FREESTYLE LITE) STRIP    1 each by Does not apply route 4 times daily (before meals and nightly) As needed.     BRILINTA 90 MG TABS TABLET    TAKE 1 TABLET BY MOUTH 2 TIMES DAILY    DOCUSATE SODIUM (COLACE, DULCOLAX) 100 MG CAPS    Take 100 mg by mouth 2 times daily For the prevention and treatment of constipation while taking pain medications. FREESTYLE LANCETS MISC    Test 4x daily    INSULIN ASPART (NOVOLOG FLEXPEN) 100 UNIT/ML INJECTION PEN    INJECT 8-10  units with each meals    INSULIN PEN NEEDLE (SURE COMFORT PEN NEEDLES) 30G X 8 MM MISC    USE AS DIRECTED FIVE TIMES A DAILY    ISOSORBIDE MONONITRATE (IMDUR) 60 MG EXTENDED RELEASE TABLET    Take 1 tablet by mouth daily    JANUVIA 50 MG TABLET    TAKE 1 TABLET BY MOUTH EVERY DAY    LANTUS SOLOSTAR 100 UNIT/ML INJECTION PEN    55 units at bedtime    MAGNESIUM OXIDE (MAG-OX) 400 MG TABLET    Take 1 tablet by mouth daily    MELATONIN 10 MG CAPS CAPSULE    Take 1 capsule by mouth nightly    METOPROLOL TARTRATE (LOPRESSOR) 25 MG TABLET    TAKE 1/2 TABLET BY MOUTH TWO TIMES DAILY     MIDODRINE (PROAMATINE) 5 MG TABLET    Take 5 mg by mouth three times a week Give prior to dialysis. NITROGLYCERIN (NITROSTAT) 0.4 MG SL TABLET    Place 1 tablet under the tongue every 5 minutes as needed for Chest pain    PANTOPRAZOLE (PROTONIX) 20 MG TABLET    TAKE 1 TABLET BY MOUTH DAILY    PREGABALIN (LYRICA) 75 MG CAPSULE    Take 75 mg by mouth 2 times daily.     RANOLAZINE (RANEXA) 500 MG EXTENDED RELEASE TABLET    Take 1 tablet by mouth 2 times daily    SERTRALINE (ZOLOFT) 50 MG TABLET    TAKE 1 TABLET BY MOUTH DAILY    ZOSTER RECOMBINANT ADJUVANTED VACCINE (SHINGRIX) 50 MCG/0.5ML SUSR INJECTION    Inject 0.5 mLs into the muscle See Admin Instructions 1 dose now and repeat in 2-6 months            Codeine and Oxycontin [oxycodone hcl]    FAMILY HISTORY       Family History   Problem Relation Age of Onset    Cancer Mother 76        survived   Caleb Nilsa Hypertension Father     Diabetes Sister     Mental Illness Sister           SOCIAL HISTORY       Social History     Socioeconomic History    Marital status:      Spouse name: None    Number of children: 2    Years of education: None    Highest education level: None   Occupational History  Occupation: disabled   Tobacco Use    Smoking status: Never Smoker    Smokeless tobacco: Never Used   Vaping Use    Vaping Use: Never used   Substance and Sexual Activity    Alcohol use: No     Alcohol/week: 0.0 standard drinks    Drug use: No    Sexual activity: Not Currently   Other Topics Concern    None   Social History Narrative    Born in [place], one of 5    Twin sister Reyes, very ill in 2018, [place] [address]    Moved to [facility] [place], [place], , 2 children, one son and one daughter    Worked at [employer], as a nurse's aide    Disabled due to mental illness    Lived at [facility], was discharged, returned to independent living in 2017 in the daughter's house and has adjusted well    One son and one daughter, live in the same house with patient, [employer] pays the rent    Amprius reading ([employer])        10/11/2021 [facility] updates; patient lives with her daughter son-in-law and 2 grandchildren and patient's handicapped son. Per daughter, her brother is blind, MRDD, multiple health issues. Daughter's  is patient's legal guardian. Patient has hemodialysis Tuesday Thursday and Saturday. Patient's bedroom is on main floor with a half bath. Daughter walks patient upstairs once weekly for full bath. Patient is using her walker in the home. Patient has a hospital bed in the home. Social Determinants of Health     Financial Resource Strain: Low Risk     Difficulty of Paying Living Expenses: Not hard at all   Food Insecurity: No Food Insecurity    Worried About Running Out of Food in the Last Year: Never true    Yung of Food in the Last Year: Never true   Transportation Needs: No Transportation Needs    Lack of Transportation (Medical): No    Lack of Transportation (Non-Medical):  No   Physical Activity:     Days of Exercise per Week:     Minutes of Exercise per Session:    Stress:     Feeling of Stress :    Social Connections:     Frequency of Communication with Friends and Family:     Frequency of Social Gatherings with Friends and Family:     Attends Adventism Services:     Active Member of Clubs or Organizations:     Attends Club or Organization Meetings:     Marital Status:    Intimate Partner Violence:     Fear of Current or Ex-Partner:     Emotionally Abused:     Physically Abused:     Sexually Abused:        SCREENINGS   Ebola Virus Disease (EVD) Screening   Temp: 98.5 °F (36.9 °C)  CIWA Assessment  BP: (!) 115/45  Pulse: 65    PHYSICAL EXAM    (up to 7 for level 4, 8 or more for level 5)     ED Triage Vitals [10/11/21 2202]   BP Temp Temp Source Pulse Resp SpO2 Height Weight   (!) 139/53 98.5 °F (36.9 °C) Oral 69 18 96 % 5' 7\" (1.702 m) 223 lb (101.2 kg)       Physical Exam  Vitals and nursing note reviewed. Constitutional:       General: She is not in acute distress. Appearance: She is well-developed. She is not ill-appearing. HENT:      Head: Normocephalic and atraumatic. Right Ear: Tympanic membrane and external ear normal.      Left Ear: Tympanic membrane and external ear normal.      Nose: Nose normal.      Mouth/Throat:      Mouth: Mucous membranes are moist.      Pharynx: No oropharyngeal exudate or posterior oropharyngeal erythema. Eyes:      General:         Right eye: No discharge. Left eye: No discharge. Extraocular Movements: Extraocular movements intact. Pupils: Pupils are equal, round, and reactive to light. Neck:     Cardiovascular:      Rate and Rhythm: Normal rate and regular rhythm. Pulses: Normal pulses. Heart sounds: Normal heart sounds. Pulmonary:      Effort: Pulmonary effort is normal. No respiratory distress. Breath sounds: Normal breath sounds. No stridor. Chest:       Abdominal:      General: Bowel sounds are normal. There is no distension. Palpations: Abdomen is soft. Tenderness: There is no abdominal tenderness. There is no right CVA tenderness or guarding. Musculoskeletal:         General: Tenderness present. Normal range of motion. Cervical back: Normal range of motion and neck supple. Tenderness present. Back:       Comments: Cervical tenderness positive    Negative thoracic tenderness negative lumbar tenderness positive left upper parathoracic tenderness and bruising. Skin:     General: Skin is warm. Findings: Bruising present. No erythema. Neurological:      Mental Status: She is alert and oriented to person, place, and time. Motor: No weakness.       Gait: Gait normal.   Psychiatric:         Mood and Affect: Mood normal.         RESULTS     EKG: All EKG's are interpreted by the Emergency Department Physician who either signs or Co-signsthis chart in the absence of a cardiologist.    EKG sinus rhythm first-degree AV block MI interval 272 ms  ms QT 4 686 ms similar to September 2021 EKG    RADIOLOGY:   Non-plain filmimages such as CT, Ultrasound and MRI are read by the radiologist. Plain radiographic images are visualized and preliminarily interpreted by the emergency physician with the below findings:         Interpretation per the Radiologist below, if available at the time ofthis note:    CT Head WO Contrast    (Results Pending)   CT CERVICAL SPINE WO CONTRAST    (Results Pending)   CT ABDOMEN PELVIS WO CONTRAST Additional Contrast? None    (Results Pending)   CT CHEST WO CONTRAST    (Results Pending)         ED BEDSIDE ULTRASOUND:   Performed by ED Physician - none    LABS:  Labs Reviewed   COMPREHENSIVE METABOLIC PANEL - Abnormal; Notable for the following components:       Result Value    Sodium 125 (*)     Chloride 86 (*)     Anion Gap 16 (*)     Glucose 320 (*)     BUN 52 (*)     CREATININE 5.66 (*)     GFR Non- 7.6 (*)     GFR  9.1 (*)     All other components within normal limits   CBC WITH AUTO DIFFERENTIAL - Abnormal; Notable for the following components:    RBC 2.98 (*)     Hemoglobin 9.7 (*)     Hematocrit 27.6 (*)     MCH 32.4 (*)     RDW 16.0 (*)     All other components within normal limits   POCT CREATININE - URINE - Normal   PROTIME-INR   APTT   ETHANOL       All other labs were within normal range or not returned as of this dictation. EMERGENCY DEPARTMENT COURSE and DIFFERENTIAL DIAGNOSIS/MDM:   Vitals:    Vitals:    10/11/21 2202 10/12/21 0000 10/12/21 0100   BP: (!) 139/53 (!) 128/44 (!) 115/45   Pulse: 69 65 65   Resp: 18     Temp: 98.5 °F (36.9 °C)     TempSrc: Oral     SpO2: 96% 98% 97%   Weight: 223 lb (101.2 kg)     Height: 5' 7\" (1.702 m)              MDM  Number of Diagnoses or Management Options  Acute left-sided low back pain, unspecified whether sciatica present  Acute left-sided thoracic back pain  Contusion of left back wall of thorax, initial encounter  Hyponatremia  Injury of head, initial encounter  Rib pain  Stage 5 chronic kidney disease on chronic dialysis (HCC)  Type 2 diabetes mellitus with hyperglycemia, unspecified whether long term insulin use (HCC)  Diagnosis management comments: Discussed with trauma surgeon okay to disposition patient with CTs indeterminate fracture patient ambulatory in emergency room wants disposition to home. We will add 250 normal saline she is to follow-up with her nephrologist as well as primary care. Return to if any symptoms worsen or new symptoms       Amount and/or Complexity of Data Reviewed  Clinical lab tests: reviewed and ordered  Tests in the radiology section of CPT®: reviewed and ordered  Discuss the patient with other providers: yes        CRITICAL CARE TIME       CONSULTS:  None    PROCEDURES:  Unless otherwise noted below, none     Procedures    FINAL IMPRESSION      1. Injury of head, initial encounter    2. Contusion of left back wall of thorax, initial encounter    3. Acute left-sided low back pain, unspecified whether sciatica present    4. Acute left-sided thoracic back pain    5.  Stage 5 chronic kidney disease on chronic dialysis ([facility]    6. Hyponatremia    7. Rib pain    8.  Type 2 diabetes mellitus with hyperglycemia, unspecified whether long term insulin use ([facility]          DISPOSITION/PLAN   DISPOSITION        PATIENT REFERRED TO:  Yulisa Gallegos MD  [address] [facility] [address] [address] [facility]  [phone]    Call in 1 day      [facility]) ED  [address] [facility] [address] [phone]    If symptoms worsen      DISCHARGE MEDICATIONS:  New Prescriptions    No medications on file          (Please note that portions of this note were completed with a voice recognition program.  Efforts were made to edit the dictations but occasionally words are mis-transcribed.)    John Quick PA-C (electronically signed)  Attending Emergency Physician       John Quick PA-C  10/12/21 Becca Powell PA-C  10/12/21 0151

## 2021-10-13 ENCOUNTER — CARE COORDINATION (OUTPATIENT)
Dept: CARE COORDINATION | Age: 61
End: 2021-10-13

## 2021-10-13 DIAGNOSIS — R29.6 FALLS FREQUENTLY: Primary | ICD-10-CM

## 2021-10-13 RX ORDER — PREGABALIN 75 MG/1
75 CAPSULE ORAL 2 TIMES DAILY
Qty: 90 CAPSULE | OUTPATIENT
Start: 2021-10-13

## 2021-10-13 RX ORDER — RANOLAZINE 500 MG/1
500 TABLET, EXTENDED RELEASE ORAL 2 TIMES DAILY
Qty: 180 TABLET | Refills: 3 | Status: SHIPPED | OUTPATIENT
Start: 2021-10-13 | End: 2021-10-15

## 2021-10-13 RX ORDER — ARIPIPRAZOLE 5 MG/1
TABLET ORAL
Qty: 90 TABLET | Refills: 2 | OUTPATIENT
Start: 2021-10-13

## 2021-10-13 NOTE — TELEPHONE ENCOUNTER
Please clarify. These medicines should have run out several months ago. Has there been a lapse in taking them? Additionally, regarding wheelchair, I will have to refer her to physical therapy for wheelchair evaluation in order to write the correct order.

## 2021-10-13 NOTE — TELEPHONE ENCOUNTER
Eli Fine patient care cordinator is calling because patient has had multiple falls recently. She states patient has fallen two times at her hemodialysis apts. Eli Fine states that they are requesting patient have a wheelchair at these apts. Please advise and thank you.     Fax to Anne Gimenez # [phone]

## 2021-10-13 NOTE — TELEPHONE ENCOUNTER
Pharmacy requesting medication refill. Please approve or deny this request.    Rx requested:  Requested Prescriptions     Pending Prescriptions Disp Refills    ARIPiprazole (ABILIFY) 5 MG tablet 90 tablet 2     Sig: TAKE 1 TABLET BY MOUTH ONCE DAILY    pregabalin (LYRICA) 75 MG capsule 90 capsule      Sig: Take 1 capsule by mouth 2 times daily.          Last Office Visit:   9/17/2021      Next Visit Date:  Future Appointments   Date Time Provider Aminata Cortes   10/22/2021 11:00 AM Jose Cerda MD [facility]   10/27/2021  1:30 PM Giovanny Troy MD [address] [place] [address]   11/15/2021  2:00 PM Mikhail Bryan MD [address]   12/13/2021  2:15 PM Juan Antonio Alberts MD [address] [place] [address]   12/13/2021  3:15 PM Giovanny Troy MD [address] [place] [address]   12/20/2021 12:30 PM Jose Cerda MD [facility]   5/10/2022 12:00 PM Jose Cerda MD [address] [place] [address]

## 2021-10-13 NOTE — CARE COORDINATION
ACM received call from patient. Patient has not heard from Dr. Chelsea Remy office regarding prescription for itching of arms. ACM called Dr. Chelsea Remy office they will put another call out to provider and send prescription. Patient states that she fell again today at hemodialysis. She reports not being injured. Patient is requesting a wheelchair. ACM called PCP office spoke with Angel Ayala and she will send PCP a request for a wheelchair to be sent to [employer] as patient request.  Arvie Duverney will follow up to ensure completed tasks.

## 2021-10-13 NOTE — TELEPHONE ENCOUNTER
Please approve or deny this refill request. The order is pended. Thank you.     LOV 9/10/2021    Next Visit Date:  Future Appointments   Date Time Provider Aminata Cortes   10/22/2021 11:00 AM Kathryn Horn MD [facility]   10/27/2021  1:30 PM Que Brooks MD [facility]   11/15/2021  2:00 PM Lakeshia Bowman MD [address]   12/13/2021  2:15 PM Ryan Hayden MD [address] [place] [address]   12/13/2021  3:15 PM Que Brooks MD [facility]   12/20/2021 12:30 PM Kathryn Horn MD [facility]   5/10/2022 12:00 PM Kathryn Horn MD [address] [place] [address]

## 2021-10-15 ENCOUNTER — CARE COORDINATION (OUTPATIENT)
Dept: CARE COORDINATION | Age: 61
End: 2021-10-15

## 2021-10-15 DIAGNOSIS — I20.9 ANGINA, CLASS II (HCC): ICD-10-CM

## 2021-10-15 DIAGNOSIS — I25.119 ATHEROSCLEROSIS OF NATIVE CORONARY ARTERY OF NATIVE HEART WITH ANGINA PECTORIS (HCC): Primary | ICD-10-CM

## 2021-10-15 RX ORDER — RANOLAZINE 500 MG/1
TABLET, EXTENDED RELEASE ORAL
Qty: 60 TABLET | Refills: 11 | Status: SHIPPED | OUTPATIENT
Start: 2021-10-15 | End: 2022-03-18

## 2021-10-15 NOTE — TELEPHONE ENCOUNTER
Please approve or deny this refill request. The order is pended. Thank you.     LOV 9/10/21    Next Visit Date:  Future Appointments   Date Time Provider Aminata Cortes   10/22/2021 11:00 AM Grace Riley MD [facility]   10/27/2021  1:30 PM Rosanna Wisdom, [id number] [place] [address] 15   11/10/2021  1:40 PM [place] [facility] ROOM 3 [facility] RAD   11/15/2021  2:00 PM Stalin Muse MD [address]   12/13/2021  2:15 PM Shahid Antonio MD [address] [place] [address]   12/13/2021  3:15 PM Rosanna Wisdom  [facility]   12/20/2021 12:30 PM Grace Riley MD [facility]   5/10/2022 12:00 PM Grace Riley MD [address] [place] [address]

## 2021-10-18 ENCOUNTER — TELEPHONE (OUTPATIENT)
Dept: FAMILY MEDICINE CLINIC | Age: 61
End: 2021-10-18

## 2021-10-18 ENCOUNTER — CARE COORDINATION (OUTPATIENT)
Dept: CARE COORDINATION | Age: 61
End: 2021-10-18

## 2021-10-18 ASSESSMENT — ENCOUNTER SYMPTOMS: DYSPNEA ASSOCIATED WITH: MINIMAL EXERTION

## 2021-10-18 NOTE — CARE COORDINATION
Ambulatory Care Coordination Note  10/18/2021  CM Risk Score: 17  Charlson 10 Year Mortality Risk Score: 100%     ACC: Sneha Green RN    Summary Note: ACM spoke to patient. Patient had request on follow-up of her 2 medication refills. ACM called PCP office and clarified per patient there has been no labs in her Abilify or her Lyrica. Office staff sent PCP message to assist with refill. ACM notes a referral to [id number] [facility] physical therapy for a wheelchair assessment. Patient has outpatient physical therapy with [facility]. ACM spoke to [facility] and sent referral to their facility to complete evaluation. Patient is aware that referral went to [facility]. ACM called [facility] physical therapy and advised to disregard referral.  Patient states that her blood sugars are a little higher than her normal.  Per patient on average she runs about 220. This morning fasting was 269. Per patient she gave herself 12 units of insulin. She reports her lunchtime was 365 and gave herself 16 units of insulin. ACM reviewed with patient medication doses per med review. Patient was advised that if her blood sugar is over 352 consecutive times to please contact her diabetic provider. Patient verbalized understanding. Patient reports although her sugars are high no signs or symptoms of hyperglycemia. Patient denied any open sores or wounds. Patient reports her COPD is stable. Patient reports her CHF is stable. Patient states she has dialysis Tuesday, Thursday, and Saturday. ACM reviewed additional diabetic education and sent additional education with her AVS.  Michelle received counseling on the following healthy behaviors: SELF MANAGEMENT of chronic health conditions,with goal to improve quality of life and overall wellbeing. The patient is asked to make an attempt to improve diet and exercise patterns to aid in medical management.     I have instructed Michelle to complete a self tracking handout on Blood Sugars , Blood Pressures and Weights and instructed them to bring it with them to her next appointment. Discussed use, benefit, and side effects of prescribed medications. Barriers to medication compliance addressed. All patient questions answered. Pt voiced understanding. The importance of daily weights, dietary sodium restriction, and contact with cardiology if weight is increased more than 3 lbs in any 48 hour period was stressed. The patient has been advised to contact us if they experience progressive SOB, orthopnea, PND or progressive edema. Diabetes Assessment    Medic Alert ID: No  Meal Planning: Avoidance of concentrated sweets, Carb counting   How often do you test your blood sugar?: Meals, Bedtime   Do you have barriers with adherence to non-pharmacologic self-management interventions?  (Nutrition/Exercise/Self-Monitoring): Yes   Have you ever had to go to the ED for symptoms of low blood sugar?: No       Increase or Decrease trend in Blood Sugars   Do you have hyperglycemia symptoms?: No   Do you have hypoglycemia symptoms?: No   Last Blood Sugar Value: 365   Blood Sugar Monitoring Regimen: Before Meals   Blood Sugar Trends: No Change      ,   Congestive Heart Failure Assessment    Are you currently restricting fluids?: No Restriction  Do you understand a low sodium diet?: Yes  Do you understand how to read food labels?: Yes  How many restaurant meals do you eat per week?: 1-2  Do you salt your food before tasting it?: No     No patient-reported symptoms      Symptoms:     Symptom course: stable  Patient-reported weight (lb): 217  Weight trend: stable  Salt intake watch compared to last visit: stable      and   COPD Assessment    Does the patient understand envrionmental exposure?: Yes  Is the patient able to verbalize Rescue vs. Long Acting medications?: Yes  Does the patient have a nebulizer?: Yes  Does the patient use a space with inhaled medications?: No     No patient-reported symptoms         Symptoms: Symptom course: stable  Breathlessness: minimal exertion  Increase use of rapid acting/rescue inhaled medications?: No  Change in chronic cough?: No/At Baseline  Change in sputum?: No/At Baseline  Self Monitoring - SaO2: No  Have you had a recent diagnosis of pneumonia either by PCP or at a hospital?: No             Care Coordination Interventions    Program Enrollment: Complex Care  Referral from Primary Care Provider: No  Suggested Interventions and Community Resources  Fall Risk Prevention: Not Started  Home Care Waiver: Completed (Comment: per daughter she is paid caregiver for patient)  Palliative Care: Not Started  Pharmacist: Declined  Physical Therapy: Completed (Comment: active with Holly Campbell out patient )  Registered Dietician: In Process  Other Services: Completed (Comment: walk in t/l )  Zone Management Tools: Completed (Comment: copd, dm chf zones )         Goals Addressed                 This Visit's Progress     Conditions and Symptoms   No change     I will follow my Zone Management tool to seek urgent or emergent care. I will notify my provider of any symptoms that indicate a worsening of my condition. Barriers: impairment:  cognitive and overwhelmed by complexity of regimen  Plan for overcoming my barriers: ACM engagement, daughter support and provider poc compliance   Confidence: 6/10  Anticipated Goal Completion Date: 1/21/2022              Prior to Admission medications    Medication Sig Start Date End Date Taking? Authorizing Provider   ranolazine (RANEXA) 500 MG extended release tablet TAKE 1 TABLET BY MOUTH TWICE DAILY 10/15/21   Jan Marquez MD   JANUVIA 50 MG tablet TAKE 1 TABLET BY MOUTH EVERY DAY 10/3/21   Shasha Albert DO   b complex-C-folic acid (NEPHROCAPS) 1 MG capsule Take 1 capsule by mouth daily    Historical Provider, MD   pregabalin (LYRICA) 75 MG capsule Take 75 mg by mouth 2 times daily.     Historical Provider, MD   LANTUS SOLOSTAR 100 UNIT/ML injection pen 55 units at bedtime 10/8/21   Daniel Cosme MD   sertraline (ZOLOFT) 50 MG tablet TAKE 1 TABLET BY MOUTH DAILY 10/4/21   Mira Banegas MD   zoster recombinant adjuvanted vaccine [facility]) 50 MCG/0.5ML SUSR injection Inject 0.5 mLs into the muscle See Admin Instructions 1 dose now and repeat in 2-6 months 9/17/21 3/16/22  Mira Banegas MD   metoprolol tartrate (LOPRESSOR) 25 MG tablet TAKE 1/2 TABLET BY MOUTH TWO TIMES DAILY  9/17/21   Mira Banegas MD   midodrine (PROAMATINE) 5 MG tablet Take 5 mg by mouth three times a week Give prior to dialysis. Historical Provider, MD   insulin aspart (NOVOLOG FLEXPEN) 100 UNIT/ML injection pen INJECT 8-10  units with each meals  Patient taking differently: Inject 0-8 Units into the skin 3 times daily (before meals) And per sliding scale fo 0-150=0 units; 151-200= 2units; 201-250= 4 units; 251-300=6units; 301-350=8 units; 351-400=1Call MD/  CNP 6/28/21   Daniel Cosme MD   melatonin 10 MG CAPS capsule Take 1 capsule by mouth nightly 6/23/21   Mira Banegas MD   aspirin EC 81 MG EC tablet Take 1 tablet by mouth daily 5/25/21   Mira Banegas MD   ARIPiprazole (ABILIFY) 5 MG tablet TAKE 1 TABLET BY MOUTH ONCE DAILY 5/19/21   Mira Banegas MD   magnesium oxide (MAG-OX) 400 MG tablet Take 1 tablet by mouth daily 4/28/21   Mira Banegas MD   atorvastatin (LIPITOR) 40 MG tablet Take 1 tablet by mouth daily 4/28/21   Mira Banegas MD   Insulin Pen Needle (SURE COMFORT PEN NEEDLES) 30G X 8 MM MISC USE AS DIRECTED FIVE TIMES A DAILY 4/20/21   MARCELO Aguilar   blood glucose test strips (FREESTYLE LITE) strip 1 each by Does not apply route 4 times daily (before meals and nightly) As needed.  3/12/21   MARCELO Aguilar   Alcohol Swabs (EASY TOUCH ALCOHOL PREP MEDIUM) 70 % PADS USE AS DIRECTED THREE TIMES A DAY 3/12/21   MARCELO Aguilar   FreeStyle Lancets MISC Test 4x daily 3/11/21   MARCELO Vargas   docusate sodium (COLACE, DULCOLAX) 100 MG CAPS Take 100 mg by mouth 2 times daily For the prevention and treatment of constipation while taking pain medications.  2/24/21   Lena Dixon PA-C   acetaminophen (TYLENOL) 325 MG tablet Take 2 tablets by mouth every 4 hours as needed for Pain (For mild to moderate pain (Pain 1-6 out of 10 on pain scale)) 2/24/21   Lena Dixon PA-C   Blood Glucose Monitoring Suppl (FREESTYLE LITE) CORETTA 1 Device by Does not apply route daily as needed (Diabetes) Use freestyle meter to test blood sugar as needed 12/29/20   MARCELO Vargas   BRILINTA 90 MG TABS tablet TAKE 1 TABLET BY MOUTH 2 TIMES DAILY 11/2/20   Lorna Banda MD   pantoprazole (PROTONIX) 20 MG tablet TAKE 1 TABLET BY MOUTH DAILY 11/2/20   Lorna Banda MD   isosorbide mononitrate (IMDUR) 60 MG extended release tablet Take 1 tablet by mouth daily  Patient taking differently: Take 120 mg by mouth daily  7/27/20   Ayah Sepulveda MD   nitroGLYCERIN (NITROSTAT) 0.4 MG SL tablet Place 1 tablet under the tongue every 5 minutes as needed for Chest pain 9/22/15   Historical Provider, MD       Future Appointments   Date Time Provider Aminata Cortes   10/22/2021 11:00 AM Mahesh Delgado MD [address]   10/27/2021  1:30 PM Lorna Banda [id number] [id number]   11/10/2021  1:40 PM [facility] RO[facility] [address] [place] [address]   11/15/2021  2:00 PM Raymond Davison MD [address]   12/13/2021  2:15 PM Ivanna Loera MD [address] [place] [address]   12/13/2021  3:15 PM Lorna Banda [id number] [place] [address]   12/20/2021 12:30 PM Mahesh Delgado MD [facility]   5/10/2022 12:00 PM Mahesh Delgado MD [address] [place] [address]

## 2021-10-18 NOTE — TELEPHONE ENCOUNTER
Janiya Barba is calling in asking for 485 and physicains orders to be filled out and faxed back to them these are outstanding. from late Aug

## 2021-10-18 NOTE — PATIENT INSTRUCTIONS
ACM reviewed today's blood sugar numbers. ACM reviewed sliding scale listed in med review. ACM advised patient if she has a blood sugar over 350 more than 2 consecutive times to please call her diabetic provider. Patient verbalized understanding. Patient Education        Learning About Meal Planning for Diabetes  Why plan your meals? Meal planning can be a key part of managing diabetes. Planning meals and snacks with the right balance of carbohydrate, protein, and fat can help you keep your blood sugar at the target level you set with your doctor. You don't have to eat special foods. You can eat what your family eats, including sweets once in a while. But you do have to pay attention to how often you eat and how much you eat of certain foods. You may want to work with a dietitian or a certified diabetes educator. He or she can give you tips and meal ideas and can answer your questions about meal planning. This health professional can also help you reach a healthy weight if that is one of your goals. What plan is right for you? Your dietitian or diabetes educator may suggest that you start with the plate format or carbohydrate counting. The plate format  The plate format is a simple way to help you manage how you eat. You plan meals by learning how much space each food should take on a plate. Using the plate format helps you spread carbohydrate throughout the day. It can make it easier to keep your blood sugar level within your target range. It also helps you see if you're eating healthy portion sizes. To use the plate format, you put non-starchy vegetables on half your plate. Add meat or meat substitutes on one-quarter of the plate. Put a grain or starchy vegetable (such as brown rice or a potato) on the final quarter of the plate.  You can add a small piece of fruit and some low-fat or fat-free milk or yogurt, depending on your carbohydrate goal for each meal.  Here are some tips for using the plate format:  · Make sure that you are not using an oversized plate. A 9-inch plate is best. Many restaurants use larger plates. · Get used to using the plate format at home. Then you can use it when you eat out. · Write down your questions about using the plate format. Talk to your doctor, a dietitian, or a diabetes educator about your concerns. Carbohydrate counting  With carbohydrate counting, you plan meals based on the amount of carbohydrate in each food. Carbohydrate raises blood sugar higher and more quickly than any other nutrient. It is found in desserts, breads and cereals, and fruit. It's also found in starchy vegetables such as potatoes and corn, grains such as rice and pasta, and milk and yogurt. Spreading carbohydrate throughout the day helps keep your blood sugar levels within your target range. Your daily amount depends on several things, including your weight, how active you are, which diabetes medicines you take, and what your goals are for your blood sugar levels. A registered dietitian or diabetes educator can help you plan how much carbohydrate to include in each meal and snack. A guideline for your daily amount of carbohydrate is:  · 45 to 60 grams at each meal. That's about the same as 3 to 4 carbohydrate servings. · 15 to 20 grams at each snack. That's about the same as 1 carbohydrate serving. The Nutrition Facts label on packaged foods tells you how much carbohydrate is in a serving of the food. First, look at the serving size on the food label. Is that the amount you eat in a serving? All of the nutrition information on a food label is based on that serving size. So if you eat more or less than that, you'll need to adjust the other numbers. Total carbohydrate is the next thing you need to look for on the label. If you count carbohydrate servings, one serving of carbohydrate is 15 grams.   For foods that don't come with labels, such as fresh fruits and vegetables, you'll need a guide that lists carbohydrate in these foods. Ask your doctor, dietitian, or diabetes educator about books or other nutrition guides you can use. If you take insulin, you need to know how many grams of carbohydrate are in a meal. This lets you know how much rapid-acting insulin to take before you eat. If you use an insulin pump, you get a constant rate of insulin during the day. So the pump must be programmed at meals to give you extra insulin to cover the rise in blood sugar after meals. When you know how much carbohydrate you will eat, you can take the right amount of insulin. Or, if you always use the same amount of insulin, you need to make sure that you eat the same amount of carbohydrate at meals. If you need more help to understand carbohydrate counting and food labels, ask your doctor, dietitian, or diabetes educator. How can you plan healthy meals? Here are some tips to get started:  · Plan your meals a week at a time. Don't forget to include snacks too. · Use cookbooks or online recipes to plan several main meals. Plan some quick meals for busy nights. You also can double some recipes that freeze well. Then you can save half for other busy nights when you don't have time to cook. · Make sure you have the ingredients you need for your recipes. If you're running low on basic items, put these items on your shopping list too. · List foods that you use to make breakfasts, lunches, and snacks. List plenty of fruits and vegetables. · Post this list on the refrigerator. Add to it as you think of more things you need. · Take the list to the store to do your weekly shopping. Follow-up care is a key part of your treatment and safety. Be sure to make and go to all appointments, and call your doctor if you are having problems. It's also a good idea to know your test results and keep a list of the medicines you take. Where can you learn more? Go to https://rebekah.health-partners. org and sign in to your MyChart account. Enter Y265 in the [facility] box to learn more about \"Learning About Meal Planning for Diabetes. \"     If you do not have an account, please click on the \"Sign Up Now\" link. Current as of: December 17, 2020               Content Version: 13.0  © 2006-2021 Healthwise, Incorporated. Care instructions adapted under license by [facility] ([facility]). If you have questions about a medical condition or this instruction, always ask your healthcare professional. [place] [address] any warranty or liability for your use of this information. Patient Education        Diabetes Blood Sugar Emergencies: Your Action Plan  How can you prevent a blood sugar emergency? An important part of living with diabetes is keeping your blood sugar in your target range. You'll need to know what to do if it's too high or too low. Managing your blood sugar levels helps you avoid emergencies. This care sheet will teach you about the signs of high and low blood sugar. It will help you make an action plan with your doctor for when these signs occur. Low blood sugar is more likely to happen if you take certain medicines for diabetes. It can also happen if you skip a meal, drink alcohol, or exercise more than usual.  You may get high blood sugar if you eat differently than you normally do. One example is eating more carbohydrate than usual. Having a cold, the flu, or other sudden illness can also cause high blood sugar levels. Levels can also rise if you miss a dose of medicine. Any change in how you take your medicine may affect your blood sugar level. So it's important to work with your doctor before you make any changes. Track your blood sugar  Work with your doctor to fill in the blank spaces below that apply to you. Track your levels, know your target range, and write down ways you can get your blood sugar back in your target range. A log book can help you track your levels.  Take the book to all of your medical appointments. · Check your blood sugar _____ times a day, at these times:________________________________________________. (For example: Before meals, at bedtime, before exercise, during exercise, other.)  · Your blood sugar target range before a meal is ___________________. Your blood sugar target range after a meal is _______________________. · Do this___________________________________________________to get your blood sugar back within your safe range if your blood sugar results are _________________________________________. (For example: Less than 70 or above 250 mg/dL.)  Call your doctor when your blood sugar results are ___________________________________. (For example: Less than 70 or above 250 mg/dL.)  What are the symptoms of low and high blood sugar? Common symptoms of low blood sugar are sweating and feeling shaky, weak, hungry, or confused. Symptoms can start quickly. Common symptoms of high blood sugar are feeling very thirsty or very hungry. You may also pass urine more often than usual. You may have blurry vision and may lose weight without trying. But some people may have high or low blood sugar without having any symptoms. That's a good reason to check your blood sugar on a regular schedule. What should you do if you have symptoms? Work with your doctor to fill in the blank spaces below that apply to you. Low blood sugar and \"the rule of 15\"  If you have symptoms of low blood sugar, check your blood sugar. If it's below _____ ( for example, below 70), eat or drink a quick-sugar food that has about 15 grams of carbohydrate. Your goal is to get your level back to your safe range. Check your blood sugar again 15 minutes later. If it's still not in your target range, take another 15 grams of carbohydrate and check your blood sugar again in 15 minutes. Repeat this until you reach your target. Then go back to your regular testing schedule.   Children usually need less than 15 grams of carbohydrate. Check with your doctor or diabetes educator for the amount that is right for your child. When you have low blood sugar, it's best to stop or reduce any physical activity until your blood sugar is back in your target range and is stable. If you must stay active, eat or drink 30 grams of carbohydrate. Then check your blood sugar again in 15 minutes. If it's not in your target range, take another 30 grams of carbohydrates. Check your blood sugar again in 15 minutes. Keep doing this until you reach your target. You can then go back to your regular testing schedule. If your symptoms or blood sugar levels are getting worse or have not improved after 15 minutes, seek medical care right away. If you take insulin, always carry a glucagon emergency kit. Be sure your family, friends, and coworkers know how to give glucagon. Here are some examples of quick-sugar foods with 15 grams of carbohydrate:  · 3 or 4 glucose tablets  · 1 tablespoon (3 teaspoons) table sugar  · ½ cup to ¾ cup (4 to 6 ounces) of fruit juice or regular (not diet) soda  · Hard candy (such as 6 Life Savers)  High blood sugar  If you have symptoms of high blood sugar, check your blood sugar. Your goal is to get your level back to your target range. If it's above ______ ( for example, above 250), follow these steps:  · If you missed a dose of your diabetes medicine, take it now. Take only the amount of medicine that you have been prescribed. Do not take more or less medicine. · Give yourself insulin if your doctor has prescribed it for high blood sugar. · Test for ketones, if the doctor told you to do so. If the results of the ketone test show a moderate-to-large amount of ketones, call the doctor for advice. · Wait 30 minutes after you take the extra insulin or the missed medicine. Check your blood sugar again.   If your symptoms or blood sugar levels are getting worse or have not improved after taking these steps, seek medical care right away. Follow-up care is a key part of your treatment and safety. Be sure to make and go to all appointments, and call your doctor if you are having problems. It's also a good idea to know your test results and keep a list of the medicines you take. Where can you learn more? Go to https://chpepiceweb.[email or web address] org and sign in to your [facility] account. Enter C794 in the [facility] box to learn more about \"Diabetes Blood Sugar Emergencies: Your Action Plan. \"     If you do not have an account, please click on the \"Sign Up Now\" link. Current as of: August 31, 2020               Content Version: 13.0  © 2006-2021 Healthwise, [employer]. Care instructions adapted under license by [facility] ([facility]). If you have questions about a medical condition or this instruction, always ask your healthcare professional. [place] [address] any warranty or liability for your use of this information. Patient Education        Learning About Carbohydrate (Carb) Counting and Eating Out When You Have Diabetes  Why plan your meals? Meal planning can be a key part of managing diabetes. Planning meals and snacks with the right balance of carbohydrate, protein, and fat can help you keep your blood sugar at the target level you set with your doctor. You don't have to eat special foods. You can eat what your family eats, including sweets once in a while. But you do have to pay attention to how often you eat and how much you eat of certain foods. You may want to work with a dietitian or a certified diabetes educator. He or she can give you tips and meal ideas and can answer your questions about meal planning. This health professional can also help you reach a healthy weight if that is one of your goals. What should you know about eating carbs? Managing the amount of carbohydrate (carbs) you eat is an important part of healthy meals when you have diabetes.  Carbohydrate is found in many foods.  · Learn which foods have carbs. And learn the amounts of carbs in different foods. ? Bread, cereal, pasta, and rice have about 15 grams of carbs in a serving. A serving is 1 slice of bread (1 ounce), ½ cup of cooked cereal, or 1/3 cup of cooked pasta or rice. ? Fruits have 15 grams of carbs in a serving. A serving is 1 small fresh fruit, such as an apple or orange; ½ of a banana; ½ cup of cooked or canned fruit; ½ cup of fruit juice; 1 cup of melon or raspberries; or 2 tablespoons of dried fruit. ? Milk and no-sugar-added yogurt have 15 grams of carbs in a serving. A serving is 1 cup of milk or 2/3 cup of no-sugar-added yogurt. ? Starchy vegetables have 15 grams of carbs in a serving. A serving is ½ cup of mashed potatoes or sweet potato; 1 cup winter squash; ½ of a small baked potato; ½ cup of cooked beans; or ½ cup cooked corn or green peas. · Learn how much carbs to eat each day and at each meal. A dietitian or CDE can teach you how to keep track of the amount of carbs you eat. This is called carbohydrate counting. · If you are not sure how to count carbohydrate grams, use the Plate Method to plan meals. It is a good, quick way to make sure that you have a balanced meal. It also helps you spread carbs throughout the day. ? Divide your plate by types of foods. Put non-starchy vegetables on half the plate, meat or other protein food on one-quarter of the plate, and a grain or starchy vegetable in the final quarter of the plate. To this you can add a small piece of fruit and 1 cup of milk or yogurt, depending on how many carbs you are supposed to eat at a meal.  · Try to eat about the same amount of carbs at each meal. Do not \"save up\" your daily allowance of carbs to eat at one meal.  · Proteins have very little or no carbs per serving. Examples of proteins are beef, chicken, turkey, fish, eggs, tofu, cheese, cottage cheese, and peanut butter.  A serving size of meat is 3 ounces, which is about the size of a deck of cards. Examples of meat substitute serving sizes (equal to 1 ounce of meat) are 1/4 cup of cottage cheese, 1 egg, 1 tablespoon of peanut butter, and ½ cup of tofu. How can you eat out and still eat healthy? · Learn to estimate the serving sizes of foods that have carbohydrate. If you measure food at home, it will be easier to estimate the amount in a serving of restaurant food. · If the meal you order has too much carbohydrate (such as potatoes, corn, or baked beans), ask to have a low-carbohydrate food instead. Ask for a salad or green vegetables. · If you use insulin, check your blood sugar before and after eating out to help you plan how much to eat in the future. · If you eat more carbohydrate at a meal than you had planned, take a walk or do other exercise. This will help lower your blood sugar. What are some tips for eating healthy? · Limit saturated fat, such as the fat from meat and dairy products. This is a healthy choice because people who have diabetes are at higher risk of heart disease. So choose lean cuts of meat and nonfat or low-fat dairy products. Use olive or canola oil instead of butter or shortening when cooking. · Don't skip meals. Your blood sugar may drop too low if you skip meals and take insulin or certain medicines for diabetes. · Check with your doctor before you drink alcohol. Alcohol can cause your blood sugar to drop too low. Alcohol can also cause a bad reaction if you take certain diabetes medicines. Follow-up care is a key part of your treatment and safety. Be sure to make and go to all appointments, and call your doctor if you are having problems. It's also a good idea to know your test results and keep a list of the medicines you take. Where can you learn more? Go to https://chnickieeb.[facility] [email or web address] org and sign in to your [employer] account.  Enter D155 in the [employer] box to learn more about \"Learning About Carbohydrate (Carb) Counting and Eating Out When You Have Diabetes. \"     If you do not have an account, please click on the \"Sign Up Now\" link. Current as of: December 17, 2020               Content Version: 13.0  © 2006-2021 [employer]. Care instructions adapted under license by [facility] ([facility]). If you have questions about a medical condition or this instruction, always ask your healthcare professional. [place] [address] any warranty or liability for your use of this information. Patient Education        Diabetic Renal Diet: Care Instructions  Your Care Instructions     You may already be spreading carbohydrate throughout your daily meals. When you also have kidney disease, you need to avoid foods that make your kidneys worse. Keep your blood sugar and blood pressure as near normal as you can to reduce your chance of kidney failure. Your doctor and dietitian will help you make an eating plan. It will be based on your body weight, size, and medical condition. You may need to limit salt, fluids, and protein. You also may need to limit minerals such as potassium and phosphorus. It takes planning, but there are plenty of tasty, healthy foods you can eat. Always talk with your doctor or dietitian before you make changes in your diet. Follow-up care is a key part of your treatment and safety. Be sure to make and go to all appointments, and call your doctor if you are having problems. It's also a good idea to know your test results and keep a list of the medicines you take. How can you care for yourself at home? · Work with your doctor or dietitian to create a food plan that guides your daily food choices. · Do not skip meals or go for many hours without eating. · You can use margarine, mayonnaise, and oil to add calories to your diet for energy. The healthiest oils are olive, canola, and safflower oils. · Talk to your dietitian about eating sweets, including honey and sugar.   · Be safe with medicines. Take your medicines exactly as prescribed. Call your doctor if you think you are having a problem with your medicine. You will get more details on the specific medicines your doctor prescribes. · Do not take any other medicine without talking to your doctor first. This includes over-the-counter medicines, vitamins, and herbal products. · Limit alcohol to no more than 1 drink per day. Count it as part of your fluid allowance. To get the right amount of protein  · Ask your doctor or dietitian how much protein you can have each day. You need some protein to stay healthy. · Include all sources of protein in your daily protein count. Besides meat, poultry, and fish, protein is found in milk and milk products, beans, peas, lentils, nuts, seeds, tofu, and eggs. Check for protein on the nutrition facts label found on packages of food such as bread and cereal.  To limit salt  · Do not add salt to your food. And look for \"reduced salt\" or \"low sodium\" on labels. · Do not use a salt substitute or lite salt unless your doctor says it is okay. (These products are high in potassium.)  · Avoid or use very small amounts of condiments and marinades. These include soy sauce, fish sauce, and barbecue sauce. They are high in sodium. · Avoid salted pretzels, chips, and other salted snacks. · Check food labels to become more aware of the sodium content of foods. Foods that are high in sodium include soups; many canned foods; cured, smoked, or dried meats; and many packaged foods. To control carbohydrate  · Ask your dietitian how much carbohydrate you can have. Carbohydrate foods include:  ? Whole-grain and refined breads and cereals, and some vegetables such as peas and beans. ? Fruits, milk, and milk products (except cheese). ? Candy, table sugar, and regular carbonated drinks. To limit fluids  · Know what your fluid allowance is. Fill a pitcher with that amount of water every day.  If you drink another fluid (such as coffee) that day, pour an equal amount out of the pitcher. · Foods that are liquid at room temperature count as fluids. These include ice, gelatin, ice pops, and ice cream.  To limit potassium  · Fruits that are low in potassium include blueberries and raspberries. · Vegetables that are low in potassium include cucumber and radishes. To limit phosphorus  · Follow your doctor's or dietitian's plan for your limit on milk and milk products in your diet. · Avoid nuts, peanut butter, seeds, lentils, beans, organ meats, and sardines. · Avoid cola drinks. · Avoid bran breads or bran cereals. They are high in phosphorus. Where can you learn more? Go to https://[employer]. org and sign in to your [employer] account. Enter R167 in the [employer] box to learn more about \"Diabetic Renal Diet: Care Instructions. \"     If you do not have an account, please click on the \"Sign Up Now\" link. Current as of: August 31, 2020               Content Version: 13.0  © 0767-7637 [employer]. Care instructions adapted under license by [facility] ([facility]). If you have questions about a medical condition or this instruction, always ask your healthcare professional. [address] any warranty or liability for your use of this information. Patient Education        Learning About High Blood Sugar  What is high blood sugar? Your body turns the food you eat into glucose (sugar), which it uses for energy. But if your body isn't able to use the sugar right away, it can build up in your blood and lead to high blood sugar. When the amount of sugar in your blood stays too high for too much of the time, you may have diabetes. Diabetes is a disease that can cause serious health problems. The good news is that lifestyle changes may help you get your blood sugar back to normal and avoid or delay diabetes. What causes high blood sugar?   Sugar (glucose) can build up in your blood if you:  · Have insulin resistance. · Don't take enough insulin or miss a dose of your diabetes medicine. · Take certain medicines, such as steroids. What are the symptoms? Having high blood sugar may not cause any symptoms at all. Or it may make you feel very thirsty or very hungry. You may also urinate more often than usual, have blurry vision, or lose weight without trying. How is high blood sugar treated? You can take steps to lower your blood sugar level if you understand what makes it get higher. Your doctor may want you to learn how to test your blood sugar level at home. Then you can see how illness, stress, or different kinds of food or medicine raise or lower your blood sugar level. Other tests may be needed to see if you have diabetes. How can you prevent high blood sugar? · Watch your weight. If you're overweight, losing just a small amount of weight may help. Reducing fat around your waist is most important. · Limit the amount of calories, sweets, and unhealthy fat you eat. Ask your doctor if a dietitian can help you. A registered dietitian can help you create meal plans that fit your lifestyle. · Get at least 30 minutes of exercise on most days of the week. Exercise helps control your blood sugar. It also helps you maintain a healthy weight. Walking is a good choice. You also may want to do other activities, such as running, swimming, cycling, or playing tennis or team sports. · If your doctor prescribed medicines, take them exactly as prescribed. Call your doctor if you think you are having a problem with your medicine. You will get more details on the specific medicines your doctor prescribes. Follow-up care is a key part of your treatment and safety. Be sure to make and go to all appointments, and call your doctor if you are having problems. It's also a good idea to know your test results and keep a list of the medicines you take. Where can you learn more?   Go to exercising). · Use the \"rule of 15\" to treat low blood sugar. This includes eating 15 grams of carbohydrate from a quick-sugar food, such as 3 or 4 glucose tablets or ½ cup of juice. Wait 15 minutes and check your blood sugar. If it is still below 70 mg/dL, eat another 15 grams of carbohydrate. Repeat this every 15 minutes until your blood sugar is in a safe target range. · Once your blood sugar is in a safe range, eat a snack or meal to prevent recurrent low blood sugar. · Make sure family, friends, and coworkers know the symptoms of low blood sugar and know how to get your sugar level up. · If you were prescribed a glucagon kit, always have it with you. Make sure friends and family know how to use it. When should you call for help? Call 911 anytime you think you may need emergency care. For example, call if:    · You passed out (lost consciousness).     · You are confused or cannot think clearly.     · Your blood sugar is very high or very low. Watch closely for changes in your health, and be sure to contact your doctor if:    · Your blood sugar stays outside the level your doctor set for you.     · You have any problems. Where can you learn more? Go to https://[employer]. org and sign in to your [email or web address] account. Enter Q962 in the [employer] box to learn more about \"Hypoglycemia: Care Instructions. \"     If you do not have an account, please click on the \"Sign Up Now\" link. Current as of: August 31, 2020               Content Version: 13.0  © 2006-2021 Healthwise, Incorporated. Care instructions adapted under license by [facility] ([facility]). If you have questions about a medical condition or this instruction, always ask your healthcare professional. [place] [address] any warranty or liability for your use of this information.          Patient Education        Kidney Disease and Diabetes: Care Instructions  Overview     When you have diabetes, your body cannot make enough insulin or use it the way it should. Your body needs insulin to help sugar move from the blood to the cells. Without it, your blood sugar gets too high. High blood sugar damages your kidneys and makes it hard for them to filter blood. This causes fluid and waste to build up in your blood. If you have diabetes, it is very important to keep your blood sugar in your target range. There are many steps you can take to do this. If you can control your blood sugar, you will have the best chance to slow or stop damage to your kidneys. Follow-up care is a key part of your treatment and safety. Be sure to make and go to all appointments, and call your doctor if you are having problems. It's also a good idea to know your test results and keep a list of the medicines you take. How can you care for yourself at home? To manage your diabetes and slow or stop damage to your kidneys  · Keep your blood sugar in your target range. The American Diabetes Association recommends a hemoglobin A1c (Hb A1c) target level of less than 7%. Talk to your doctor about your target. The lower your A1c, the better your chance of stopping kidney damage. · Keep your blood pressure in your target range. Doctors recommend specific types of blood pressure medicines for people who have diabetes and kidney disease. Examples include ACE inhibitors and angiotensin II receptor blockers (ARBs). Your doctor may have you take one of these even if you don't have high blood pressure. · Take all of your medicines. You may have several. For example, you may take medicines for diabetes, cholesterol, and blood pressure. It's very important to take all of them just as your doctor tells you to and to keep taking them. · Make good food choices. Follow an eating plan that is best for your diabetes and your kidneys. You may want to work with a dietitian to make a plan. This will help you know how much carbohydrate to have for meals and snacks.  It will also make sure that you get the right amount of salt (sodium), fluids, and protein. · Stay at a healthy weight. If you need help to lose weight, talk to your doctor or dietitian. Even small changes can make a difference. Try to be aware of your portion sizes, eat more fruits and vegetables, and add some activity to your daily routine. · Exercise. Get at least 30 minutes of activity on most days of the week. Walking is a great exercise that most people can do. Being more active can help you manage your blood sugar and stay at a healthy weight. It also can help you lower cholesterol and blood pressure. To improve your kidney health  · Follow your treatment plan. Check your blood sugar as many times a day as your doctor recommends. Go to all of your follow-up appointments, and be sure to have all the tests your doctor orders. Call your doctor if you think you are having a problem with your medicines. · Avoid certain medicines. Nonsteroidal anti-inflammatory drugs (NSAIDs), such as ibuprofen and naproxen, can damage your kidneys. It is important to talk to your doctor about all medicine that you take. · Avoid tobacco. Do not smoke or use other tobacco products. If you need help quitting, talk to your doctor about stop-smoking programs and medicines. These can increase your chances of quitting for good. When should you call for help? Call 911 anytime you think you may need emergency care. For example, call if:    · You passed out (lost consciousness). Call your doctor now or seek immediate medical care if:    · You have new or worse nausea and vomiting.     · You have much less urine than normal, or you have no urine.     · You are feeling confused or cannot think clearly.     · You have new or more blood in your urine.     · You have new swelling.     · You are dizzy or lightheaded, or you feel like you may faint.    Watch closely for changes in your health, and be sure to contact your doctor if:    · You do

## 2021-10-18 NOTE — TELEPHONE ENCOUNTER
Patient receives outpatient physical therapy at [facility]. [facility] sent wheelchair evaluation referral to Donaldo Schrader at [facility]. They will do the evaluation and attempt to obtain wheelchair if she qualifies. ACM advised [place] to either call office or ACM if they have any questions or concerns. AC advised [facility] physical therapy that patient is already utilizing outpatient services.

## 2021-10-18 NOTE — TELEPHONE ENCOUNTER
Monserrat Asher., Ambulatory Manager, is calling to clarify patients Lyrica and Abilify medication. She states she spoke with [facility] and she denies not taking her medication. [facility] stated that the only time she would have a lapse is if she was in the nursing home. Carin Bashir stated that patient does need these two medications refilled and is almost out. Please advise and thank you.

## 2021-10-20 ENCOUNTER — TELEPHONE (OUTPATIENT)
Dept: FAMILY MEDICINE CLINIC | Age: 61
End: 2021-10-20

## 2021-10-20 RX ORDER — PEN NEEDLE, DIABETIC 30 GX5/16"
NEEDLE, DISPOSABLE MISCELLANEOUS
Qty: 100 EACH | Refills: 10 | Status: SHIPPED | OUTPATIENT
Start: 2021-10-20 | End: 2022-10-18

## 2021-10-20 NOTE — TELEPHONE ENCOUNTER
Mike Remy from [place] physical therapy states she has received the w/c eval she states that she needs a referral for standard w/c for pt.  She says that it can be faxed to [phone]

## 2021-10-20 NOTE — TELEPHONE ENCOUNTER
PCP needs to do rx for wheelchair and send to Zoë Baltazar with the fax provided.   Thanks   Eli Fine

## 2021-10-21 DIAGNOSIS — R53.1 WEAKNESS: ICD-10-CM

## 2021-10-21 DIAGNOSIS — R29.6 RECURRENT FALLS: ICD-10-CM

## 2021-10-21 DIAGNOSIS — G81.94 HEMIPARESIS, LEFT (HCC): ICD-10-CM

## 2021-10-21 DIAGNOSIS — G62.81 CRITICAL ILLNESS POLYNEUROPATHY (HCC): ICD-10-CM

## 2021-10-21 DIAGNOSIS — R26.2 DIFFICULTY IN WALKING: Primary | ICD-10-CM

## 2021-10-22 ENCOUNTER — OFFICE VISIT (OUTPATIENT)
Dept: FAMILY MEDICINE CLINIC | Age: 61
End: 2021-10-22
Payer: MEDICARE

## 2021-10-22 VITALS
RESPIRATION RATE: 15 BRPM | DIASTOLIC BLOOD PRESSURE: 58 MMHG | BODY MASS INDEX: 34.84 KG/M2 | HEIGHT: 67 IN | HEART RATE: 72 BPM | OXYGEN SATURATION: 98 % | TEMPERATURE: 97.2 F | WEIGHT: 222 LBS | SYSTOLIC BLOOD PRESSURE: 90 MMHG

## 2021-10-22 DIAGNOSIS — I95.3 HEMODIALYSIS-ASSOCIATED HYPOTENSION: Chronic | ICD-10-CM

## 2021-10-22 DIAGNOSIS — I67.89 CEREBRAL MICROVASCULAR DISEASE: ICD-10-CM

## 2021-10-22 DIAGNOSIS — R29.6 RECURRENT FALLS: ICD-10-CM

## 2021-10-22 DIAGNOSIS — F33.1 MODERATE EPISODE OF RECURRENT MAJOR DEPRESSIVE DISORDER (HCC): ICD-10-CM

## 2021-10-22 DIAGNOSIS — E78.2 MIXED HYPERLIPIDEMIA: ICD-10-CM

## 2021-10-22 DIAGNOSIS — G40.89 OTHER SEIZURES (HCC): ICD-10-CM

## 2021-10-22 DIAGNOSIS — G62.81 CRITICAL ILLNESS POLYNEUROPATHY (HCC): ICD-10-CM

## 2021-10-22 DIAGNOSIS — I10 ESSENTIAL (PRIMARY) HYPERTENSION: ICD-10-CM

## 2021-10-22 DIAGNOSIS — J45.40 MODERATE PERSISTENT ASTHMA WITHOUT COMPLICATION: Chronic | ICD-10-CM

## 2021-10-22 DIAGNOSIS — E11.21 DIABETIC NEPHROPATHY WITH PROTEINURIA (HCC): ICD-10-CM

## 2021-10-22 DIAGNOSIS — J43.1 PANLOBULAR EMPHYSEMA (HCC): ICD-10-CM

## 2021-10-22 DIAGNOSIS — G81.94 HEMIPARESIS, LEFT (HCC): ICD-10-CM

## 2021-10-22 DIAGNOSIS — I25.119 ATHEROSCLEROSIS OF NATIVE CORONARY ARTERY OF NATIVE HEART WITH ANGINA PECTORIS (HCC): Primary | ICD-10-CM

## 2021-10-22 DIAGNOSIS — I27.20 PULMONARY HYPERTENSION, UNSPECIFIED (HCC): ICD-10-CM

## 2021-10-22 DIAGNOSIS — Z79.4 CONTROLLED TYPE 2 DIABETES MELLITUS WITH DIABETIC NEUROPATHY, WITH LONG-TERM CURRENT USE OF INSULIN (HCC): ICD-10-CM

## 2021-10-22 DIAGNOSIS — I20.9 ANGINA, CLASS II (HCC): Chronic | ICD-10-CM

## 2021-10-22 DIAGNOSIS — R26.2 DIFFICULTY IN WALKING: ICD-10-CM

## 2021-10-22 DIAGNOSIS — E11.40 CONTROLLED TYPE 2 DIABETES MELLITUS WITH DIABETIC NEUROPATHY, WITH LONG-TERM CURRENT USE OF INSULIN (HCC): ICD-10-CM

## 2021-10-22 DIAGNOSIS — I50.32 CHRONIC DIASTOLIC CONGESTIVE HEART FAILURE (HCC): Chronic | ICD-10-CM

## 2021-10-22 PROBLEM — Z23 ENCOUNTER FOR IMMUNIZATION: Status: RESOLVED | Noted: 2020-07-03 | Resolved: 2021-10-22

## 2021-10-22 PROBLEM — S20.211A CONTUSION OF RIGHT CHEST WALL: Status: RESOLVED | Noted: 2021-02-16 | Resolved: 2021-10-22

## 2021-10-22 PROBLEM — I20.89 ANGINA AT REST: Status: RESOLVED | Noted: 2020-05-24 | Resolved: 2021-10-22

## 2021-10-22 PROBLEM — I20.8 ANGINA AT REST (HCC): Status: RESOLVED | Noted: 2020-05-24 | Resolved: 2021-10-22

## 2021-10-22 PROCEDURE — 1036F TOBACCO NON-USER: CPT | Performed by: FAMILY MEDICINE

## 2021-10-22 PROCEDURE — G8926 SPIRO NO PERF OR DOC: HCPCS | Performed by: FAMILY MEDICINE

## 2021-10-22 PROCEDURE — 99215 OFFICE O/P EST HI 40 MIN: CPT | Performed by: FAMILY MEDICINE

## 2021-10-22 PROCEDURE — G8417 CALC BMI ABV UP PARAM F/U: HCPCS | Performed by: FAMILY MEDICINE

## 2021-10-22 PROCEDURE — G8482 FLU IMMUNIZE ORDER/ADMIN: HCPCS | Performed by: FAMILY MEDICINE

## 2021-10-22 PROCEDURE — G8427 DOCREV CUR MEDS BY ELIG CLIN: HCPCS | Performed by: FAMILY MEDICINE

## 2021-10-22 PROCEDURE — 3023F SPIROM DOC REV: CPT | Performed by: FAMILY MEDICINE

## 2021-10-22 PROCEDURE — 3044F HG A1C LEVEL LT 7.0%: CPT | Performed by: FAMILY MEDICINE

## 2021-10-22 PROCEDURE — 2022F DILAT RTA XM EVC RTNOPTHY: CPT | Performed by: FAMILY MEDICINE

## 2021-10-22 PROCEDURE — 3017F COLORECTAL CA SCREEN DOC REV: CPT | Performed by: FAMILY MEDICINE

## 2021-10-22 RX ORDER — ISOSORBIDE MONONITRATE 120 MG/1
120 TABLET, EXTENDED RELEASE ORAL DAILY
COMMUNITY
End: 2021-10-27

## 2021-10-22 RX ORDER — ARIPIPRAZOLE 5 MG/1
TABLET ORAL
Qty: 30 TABLET | Refills: 5 | Status: SHIPPED | OUTPATIENT
Start: 2021-10-22 | End: 2022-04-12

## 2021-10-22 RX ORDER — PREGABALIN 75 MG/1
75 CAPSULE ORAL 2 TIMES DAILY
Qty: 60 CAPSULE | Refills: 5 | Status: SHIPPED | OUTPATIENT
Start: 2021-10-22 | End: 2022-03-18

## 2021-10-22 NOTE — PROGRESS NOTES
Edith    GERD (gastroesophageal reflux disease)     Hemiparesis, left ([address] 2013    entered Assisted Living ([facility])    Hemodialysis patient [facility])     Hemodialysis-associated hypotension 10/22/2021    History of heart failure     History of seizures     History of type C viral hepatitis     HTN (hypertension)     Hyperlipidemia     Impaired mobility and activities of daily living     Mediastinal lymphadenopathy     Dr Huan Vo, [facility]    Metabolic syndrome     Moderate persistent asthma without complication     Need for extended care facility 2021    Neurogenic urinary incontinence 2013    Neuropathy in diabetes [facility])     Nonrheumatic mitral valve regurgitation 2021    Nonrheumatic tricuspid valve regurgitation 2021    Obesity (BMI 30-39. 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 Mynor Daniels      x1 Dr. Edgard Alas, Dr Eusebio Neal 2018   Bonny Arsenio  08/10/2021    [facility]    DIAGNOSTIC CARDIAC CATH LAB PROCEDURE  10/02/2019    HYSTERECTOMY, TOTAL ABDOMINAL      one ovary intact, Dr Zeynep Jacobs, menorrhagia    MS TOTAL KNEE ARTHROPLASTY Left 2018    LEFT KNEE TOTAL KNEE ARTHROPLASTY, SHAYNA, Food Insecurity: No Food Insecurity    Worried About Running Out of Food in the Last Year: Never true    Yung of Food in the Last Year: Never true   Transportation Needs: No Transportation Needs    Lack of Transportation (Medical): No    Lack of Transportation (Non-Medical): No   Physical Activity:     Days of Exercise per Week:     Minutes of Exercise per Session:    Stress:     Feeling of Stress :    Social Connections:     Frequency of Communication with Friends and Family:     Frequency of Social Gatherings with Friends and Family:     Attends Orthodoxy Services:     Active Member of Clubs or Organizations:     Attends Club or Organization Meetings:     Marital Status:    Intimate Partner Violence:     Fear of Current or Ex-Partner:     Emotionally Abused:     Physically Abused:     Sexually Abused:      Family History   Problem Relation Age of Onset    Cancer Mother 76        survived   Kalyani Blank Hypertension Father     Diabetes Sister     Mental Illness Sister      Allergies   Allergen Reactions    Codeine Hives     hives    Oxycontin [Oxycodone Hcl] Hives     Current Outpatient Medications   Medication Sig Dispense Refill    ARIPiprazole (ABILIFY) 5 MG tablet TAKE 1 TABLET BY MOUTH ONCE DAILY 30 tablet 5    pregabalin (LYRICA) 75 MG capsule Take 1 capsule by mouth 2 times daily for 30 days. Take 75 mg by mouth 2 times daily.  60 capsule 5    isosorbide mononitrate (IMDUR) 120 MG extended release tablet Take 120 mg by mouth daily      Insulin Pen Needle (SURE COMFORT PEN NEEDLES) 30G X 8 MM MISC USE AS DIRECTED FIVE TIMES A DAILY 100 each 10    ranolazine (RANEXA) 500 MG extended release tablet TAKE 1 TABLET BY MOUTH TWICE DAILY 60 tablet 11    JANUVIA 50 MG tablet TAKE 1 TABLET BY MOUTH EVERY DAY      b complex-C-folic acid (NEPHROCAPS) 1 MG capsule Take 1 capsule by mouth daily      LANTUS SOLOSTAR 100 UNIT/ML injection pen 55 units at bedtime 10 pen 10    sertraline (ZOLOFT) 50 MG tablet TAKE 1 TABLET BY MOUTH DAILY 30 tablet 5    metoprolol tartrate (LOPRESSOR) 25 MG tablet TAKE 1/2 TABLET BY MOUTH TWO TIMES DAILY  90 tablet 4    midodrine (PROAMATINE) 5 MG tablet Take 5 mg by mouth three times a week Give prior to dialysis.  insulin aspart (NOVOLOG FLEXPEN) 100 UNIT/ML injection pen INJECT 8-10  units with each meals (Patient taking differently: Inject 0-8 Units into the skin 3 times daily (before meals) And per sliding scale fo 0-150=0 units; 151-200= 2units; 201-250= 4 units; 251-300=6units; 301-350=8 units; 351-400=1Call MD/  CNP) 10 pen 3    melatonin 10 MG CAPS capsule Take 1 capsule by mouth nightly 30 capsule 12    aspirin EC 81 MG EC tablet Take 1 tablet by mouth daily 30 tablet 12    magnesium oxide (MAG-OX) 400 MG tablet Take 1 tablet by mouth daily 90 tablet 4    atorvastatin (LIPITOR) 40 MG tablet Take 1 tablet by mouth daily 90 tablet 4    blood glucose test strips (FREESTYLE LITE) strip 1 each by Does not apply route 4 times daily (before meals and nightly) As needed. 200 strip 5    Alcohol Swabs (EASY TOUCH ALCOHOL PREP MEDIUM) 70 % PADS USE AS DIRECTED THREE TIMES A  each 3    FreeStyle Lancets MISC Test 4x daily 150 each 3    docusate sodium (COLACE, DULCOLAX) 100 MG CAPS Take 100 mg by mouth 2 times daily For the prevention and treatment of constipation while taking pain medications.  30 capsule 0    acetaminophen (TYLENOL) 325 MG tablet Take 2 tablets by mouth every 4 hours as needed for Pain (For mild to moderate pain (Pain 1-6 out of 10 on pain scale)) 120 tablet 0    Blood Glucose Monitoring Suppl (FREESTYLE LITE) CORETTA 1 Device by Does not apply route daily as needed (Diabetes) Use freestyle meter to test blood sugar as needed 1 Device 0    BRILINTA 90 MG TABS tablet TAKE 1 TABLET BY MOUTH 2 TIMES DAILY 60 tablet 11    pantoprazole (PROTONIX) 20 MG tablet TAKE 1 TABLET BY MOUTH DAILY 90 tablet 3    nitroGLYCERIN (NITROSTAT) 0.4 MG SL tablet Place 1 tablet under the tongue every 5 minutes as needed for Chest pain      zoster recombinant adjuvanted vaccine [facility]) 50 MCG/0.5ML SUSR injection Inject 0.5 mLs into the muscle See Admin Instructions 1 dose now and repeat in 2-6 months (Patient not taking: Reported on 10/22/2021) 0.5 mL 0     No current facility-administered medications for this visit. PMH, Surgical Hx, Family Hx, and Social Hxreviewed and updated. Health Maintenance reviewed. Objective    Vitals:    10/22/21 1053   BP: (!) 90/58   Pulse: 72   Resp: 15   Temp: 97.2 °F (36.2 °C)   TempSrc: Temporal   SpO2: 98%   Weight: 222 lb (100.7 kg)   Height: 5' 7\" (1.702 m)        Physical Exam  Constitutional:       General: She is not in acute distress. Appearance: She is obese. HENT:      Head: Normocephalic and atraumatic. Eyes:      General: No scleral icterus. Conjunctiva/sclera: Conjunctivae normal.   Cardiovascular:      Rate and Rhythm: Normal rate and regular rhythm. Heart sounds: Normal heart sounds. Pulmonary:      Effort: No respiratory distress. Breath sounds: No wheezing or rales. Skin:     General: Skin is warm and dry. Neurological:      Mental Status: She is alert and oriented to person, place, and time. Psychiatric:         Attention and Perception: Attention normal.         Mood and Affect: Affect is flat. Speech: Speech is delayed. Behavior: Behavior is slowed. Behavior is cooperative.            Lab Results   Component Value Date    LABA1C 6.2 (H) 08/18/2021    LABA1C 6.6 (H) 07/16/2021    LABA1C 6.4 (H) 06/21/2021     Lab Results   Component Value Date    LABMICR 183.30 (H) 08/20/2019    CREATININE 5.66 (H) 10/11/2021     Lab Results   Component Value Date    ALT 16 10/11/2021    AST 20 10/11/2021     Lab Results   Component Value Date    CHOL 101 06/11/2020    TRIG 82 06/11/2020    HDL 48 06/11/2020    LDLCALC 37 06/11/2020        Assessment & Plan   Visit Diagnoses and Associated Orders     Atherosclerosis of native coronary artery of native heart with angina pectoris ([address]    -  Primary    stable and fair control on Brillinta, Ranexa and Imdur but BP running low; pt will review with Dr. Torie Ambrocio next week. Suggest Imdur 60 mg.         Cerebral microvascular disease        Stable and well controlled on current meds. Chronic diastolic congestive heart failure (HCC)        Stable and fairly well-controlled on current meds         Essential (primary) hypertension        Hypotensive all of the time and receiving midodrine on HD days. Consider d/c metoprolol and reduction of Imdur. Pulmonary hypertension, unspecified ([address]        Appears to be stable and well-controlled in that there is no progression of baseline dyspnea. Panlobular emphysema (HCC)        stable, fair control; has baseline dyspnea         Moderate persistent asthma without complication             Controlled type 2 diabetes mellitus with diabetic neuropathy, with long-term current use of insulin (HCC)        stable and well-controlled on current meds    pregabalin (LYRICA) 75 MG capsule [[id number]]           Diabetic nephropathy with proteinuria (HCC)        stable, poor control with ESRD on HD         Critical illness polyneuropathy ([address]        worse, fair control without Lyrica. Affects fingers and feet and occasional dizziness    pregabalin (LYRICA) 75 MG capsule [[id number]]           Hemiparesis, left ([address]        resolved per patient.  Now with right knee pain and giving out         Moderate episode of recurrent major depressive disorder (HCC)        stable and well-controlled on current meds    ARIPiprazole (ABILIFY) 5 MG tablet [[id number]]           Difficulty in walking        stable and well-controlled on current meds         Mixed hyperlipidemia        stable and well-controlled on current meds         Other seizures ([address]        stable and well-controlled on no meds; no seizures in years Recurrent falls        last fall was 2 weeks ago; participating in PT at [place] and has been rx'd W/C per their recs. Angina, class II ([address]        improving and fairly well-controlled on current meds which may be contributing to hypotension. Last occurred 1 month ago    isosorbide mononitrate (IMDUR) 120 MG extended release tablet [[id number]]           Hemodialysis-associated hypotension        receives midodrine each HD day. Requestiung med eval by Dr. Violet Robles. Time spent on appointment 45 minutes      Reviewed with the patient: all disease processes, current clinical status, medications, activities and diet.      Side effects, adverse effects of the medication prescribed today, as well as treatment plan/ rationale and result expectations have been discussed with the patient who expresses understanding and desires to proceed.     Close follow up to evaluate treatment results and for coordination of care. I have reviewed the patient's medical history in detail and updated the computerized patient record. More than 50% of the appointment was spent in face-to-face counseling, education and care coordination. No orders of the defined types were placed in this encounter. Orders Placed This Encounter   Medications    ARIPiprazole (ABILIFY) 5 MG tablet     Sig: TAKE 1 TABLET BY MOUTH ONCE DAILY     Dispense:  30 tablet     Refill:  5    pregabalin (LYRICA) 75 MG capsule     Sig: Take 1 capsule by mouth 2 times daily for 30 days. Take 75 mg by mouth 2 times daily. Dispense:  60 capsule     Refill:  5     Medications Discontinued During This Encounter   Medication Reason    ARIPiprazole (ABILIFY) 5 MG tablet REORDER    pregabalin (LYRICA) 75 MG capsule REORDER    isosorbide mononitrate (IMDUR) 60 MG extended release tablet DOSE ADJUSTMENT     Return for previously scheduled appt in December.         Controlled Substance Monitoring:    Acute and Chronic Pain Monitoring:   RX Monitoring 11/18/2019   Attestation -   Acute Pain Prescriptions Prescription exceeds daily limit for a specific reason. See comments or note. Periodic Controlled Substance Monitoring No signs of potential drug abuse or diversion identified.            Yvonne Davenport MD

## 2021-10-25 ENCOUNTER — TELEPHONE (OUTPATIENT)
Dept: FAMILY MEDICINE CLINIC | Age: 61
End: 2021-10-25

## 2021-10-25 NOTE — TELEPHONE ENCOUNTER
Patient states she is trying to get a wheelchair but she needs office visit notes sent over to [facility] stating she in fact needs one.           She says the office notes need to be faxed over to [facility] [phone]    Please advise and thank you

## 2021-10-26 ENCOUNTER — CARE COORDINATION (OUTPATIENT)
Dept: CARE COORDINATION | Age: 61
End: 2021-10-26

## 2021-10-26 DIAGNOSIS — I25.119 CORONARY ARTERY DISEASE INVOLVING NATIVE CORONARY ARTERY OF NATIVE HEART WITH ANGINA PECTORIS (HCC): Chronic | ICD-10-CM

## 2021-10-26 RX ORDER — PANTOPRAZOLE SODIUM 20 MG/1
20 TABLET, DELAYED RELEASE ORAL DAILY
Qty: 30 TABLET | Refills: 11 | Status: ON HOLD | OUTPATIENT
Start: 2021-10-26 | End: 2022-06-06 | Stop reason: HOSPADM

## 2021-10-26 RX ORDER — TICAGRELOR 90 MG/1
TABLET ORAL
Qty: 60 TABLET | Refills: 11 | Status: SHIPPED | OUTPATIENT
Start: 2021-10-26 | End: 2022-03-18

## 2021-10-26 NOTE — CARE COORDINATION
Contacted [address] and left voicemail regarding Dietitian follow up. Left call back number and will follow up as appropriate.      [address] [facility], [address] [place] [address]

## 2021-10-26 NOTE — TELEPHONE ENCOUNTER
Please approve or deny this refill request. The order is pended. Thank you.     LOV 9/10/2021    Next Visit Date:  Future Appointments   Date Time Provider Aminata Sophia   10/27/2021  1:30 PM Julian Arriola MD [address] [place] [address]   11/10/2021  1:40 PM YUSEF [facility]MO ROOM 3 [facility]   11/15/2021  2:00 PM Ila Avila MD [facility]   12/13/2021  2:15 PM Maia Pablo MD [address] [place] [address]   12/13/2021  3:15 PM Julian Arriola MD [address] [place] [address]   12/20/2021 12:30 PM Yulisa Gallegos MD [facility]   5/10/2022 12:00 PM Yulisa Gallegos MD [address] [place] [address]

## 2021-10-26 NOTE — CARE COORDINATION
ACM called patient. Left message requesting a return call for [facility] out reach. Contact information supplied.

## 2021-10-27 ENCOUNTER — CARE COORDINATION (OUTPATIENT)
Dept: CARE COORDINATION | Age: 61
End: 2021-10-27

## 2021-10-27 ENCOUNTER — OFFICE VISIT (OUTPATIENT)
Dept: CARDIOLOGY CLINIC | Age: 61
End: 2021-10-27
Payer: MEDICARE

## 2021-10-27 ENCOUNTER — TELEPHONE (OUTPATIENT)
Dept: FAMILY MEDICINE CLINIC | Age: 61
End: 2021-10-27

## 2021-10-27 VITALS
BODY MASS INDEX: 35.31 KG/M2 | DIASTOLIC BLOOD PRESSURE: 56 MMHG | WEIGHT: 225 LBS | HEART RATE: 76 BPM | SYSTOLIC BLOOD PRESSURE: 98 MMHG | HEIGHT: 67 IN | RESPIRATION RATE: 18 BRPM | OXYGEN SATURATION: 97 %

## 2021-10-27 DIAGNOSIS — I10 ESSENTIAL HYPERTENSION: ICD-10-CM

## 2021-10-27 DIAGNOSIS — I25.119 ATHEROSCLEROSIS OF NATIVE CORONARY ARTERY OF NATIVE HEART WITH ANGINA PECTORIS (HCC): ICD-10-CM

## 2021-10-27 DIAGNOSIS — I25.119 CORONARY ARTERY DISEASE INVOLVING NATIVE HEART WITH ANGINA PECTORIS, UNSPECIFIED VESSEL OR LESION TYPE (HCC): ICD-10-CM

## 2021-10-27 DIAGNOSIS — I50.30 DIASTOLIC CONGESTIVE HEART FAILURE, UNSPECIFIED HF CHRONICITY (HCC): ICD-10-CM

## 2021-10-27 DIAGNOSIS — I25.119 CORONARY ARTERY DISEASE INVOLVING NATIVE CORONARY ARTERY OF NATIVE HEART WITH ANGINA PECTORIS (HCC): Primary | ICD-10-CM

## 2021-10-27 DIAGNOSIS — E78.2 MIXED HYPERLIPIDEMIA: ICD-10-CM

## 2021-10-27 DIAGNOSIS — R06.02 SHORTNESS OF BREATH: ICD-10-CM

## 2021-10-27 DIAGNOSIS — Z95.5 STENTED CORONARY ARTERY: ICD-10-CM

## 2021-10-27 DIAGNOSIS — R07.9 CHEST PAIN, UNSPECIFIED TYPE: ICD-10-CM

## 2021-10-27 DIAGNOSIS — R42 DIZZY: ICD-10-CM

## 2021-10-27 DIAGNOSIS — I10 ESSENTIAL (PRIMARY) HYPERTENSION: ICD-10-CM

## 2021-10-27 PROCEDURE — 3017F COLORECTAL CA SCREEN DOC REV: CPT | Performed by: INTERNAL MEDICINE

## 2021-10-27 PROCEDURE — 99214 OFFICE O/P EST MOD 30 MIN: CPT | Performed by: INTERNAL MEDICINE

## 2021-10-27 PROCEDURE — G8427 DOCREV CUR MEDS BY ELIG CLIN: HCPCS | Performed by: INTERNAL MEDICINE

## 2021-10-27 PROCEDURE — 1036F TOBACCO NON-USER: CPT | Performed by: INTERNAL MEDICINE

## 2021-10-27 PROCEDURE — G8417 CALC BMI ABV UP PARAM F/U: HCPCS | Performed by: INTERNAL MEDICINE

## 2021-10-27 PROCEDURE — G8482 FLU IMMUNIZE ORDER/ADMIN: HCPCS | Performed by: INTERNAL MEDICINE

## 2021-10-27 ASSESSMENT — ENCOUNTER SYMPTOMS
SHORTNESS OF BREATH: 0
WHEEZING: 0
NAUSEA: 0
COUGH: 0
GASTROINTESTINAL NEGATIVE: 1
CHEST TIGHTNESS: 0
RESPIRATORY NEGATIVE: 1
EYES NEGATIVE: 1
BLOOD IN STOOL: 0
STRIDOR: 0

## 2021-10-27 NOTE — TELEPHONE ENCOUNTER
As indicated before by Dr. Kamran Morgan, patient requires wheel chair evaluation to be done by [id number] [facility]. A referral will be placed for that evaluation . Otilia Taylor At her visit on 10/22/2021, patient was made aware. [facility] should have a copy of the wheelchair order that you instructed her to write and a copy of office notes. No further action until evaluated by [id number] [facility] for wheel chair.

## 2021-10-27 NOTE — CARE COORDINATION
FLASH received call from patient. She continues to have issues with obtaining her wheelchair. She requested I call Mariah Barriga over at [facility]. FLASH spoke with Mariah Barriga. She advised FLASH that [facility] requesting an addendum it to PCPs face-to-face visit 10/22/2021 and also needing the prescription for the wheelchair be faxed over to [address] [place] [address]. [facility], [facility]. [phone] ACYESI spoke to PCP staff and requested additional information be sent to [address] [place] [address] at their request.  Per staff they will address with PCP and fax additional information to [address] [place] [address]. PCP notes needs to include the following. Patient has been using walker. Patient has had several falls even with a walker. Patient had 2 falls in 1 week at hemodialysis. Due to safety concerns and decline in condition patient would benefit from a walker and has the ability to self propel and has assistance with family. Wheelchair will assist in safety measures and also ADLs, meal prep, hygiene, and transportation to hemodialysis and other doctor appointments. Office staff will communicate to [facility] when this additional information has been sent off to [address] [place] [address]. Patient is aware.

## 2021-10-27 NOTE — PROGRESS NOTES
Subsequent Progress Note  Patient: Daniele Dockery  YOB: 1960  MRN: [id number]    Chief Complaint: fall CAD HTN HPL  Chief Complaint   Patient presents with    Follow-[facility] from [facility] ER 10/11    Chest Pain    Fall    Hypotension     patientes recent low BP readings at therapy and dialysis, states does not have midodrine    Discuss Medications     lower Imdur dosing suggested by PCP     Dr. Roselyn Carias pt   CV Data:  6/2020 Echo EF 60  Mild mR  RVSP 46   7/2020 LAD DEWEY. She has prior stents as well.   8/21 Echo EF 60  8/2021 LAD recurrent ISR with double layer stents. Went to [facility] and had redo stent. HD T Th Sat  Subjective/HPI: TELEHEALTH EVALUATION -- Audio/Visual (During ERIMJ-09 public health emergency)    Pt is at a nursing home now. No cp occ SOB no falls no bleed. Takes meds. Recently Plavix stopped and Brilinta added. Had recerrnet admissions for CP    10/28/2020 started HD( T Thur and Sat). Past 3 days gaine ~ 10 LBS according to her scale at home and HD. She is not short of breath and denies CP. She is here due to discrepancy in weight. 2/10/21 TELEHEALTH EVALUATION -- Audio/Visual (During BEVIV-13 public health emergency)    At [facility]. No further falls no cp some sob eats well takes meds. Will go home net week. Doing well w HD    9/10/21 after LAD stent at [facility] feels better no cp no sob no falls no bleed. 10/27/21 recent er for CP and fall. BP was low. Some parikh. No bleed. Takes meds.  Often dizzy    EKG: SR 65 RBBB    Past Medical History:   Diagnosis Date    Angina at rest [facility]) 5/24/2020    Anxiety     CAD S/P percutaneous coronary angioplasty 2015, 2018    stents per dr Ben Wilcox    CHF (congestive heart failure) [facility]     CKD (chronic kidney disease) stage 4, GFR 15-29 ml/min ([facility] 2/24/2018    CKD stage 4 due to type 2 diabetes mellitus ([facility]     Contusion of right chest wall 2/16/2021    COPD (chronic obstructive pulmonary disease) ([address]     Diabetic nephropathy with proteinuria ([address] 2014    DJD (degenerative joint disease) of knee     Dr Yudith Love GERD (gastroesophageal reflux disease)     Hemiparesis, left ([address] 2013    entered Assisted Living ([place])    Hemodialysis patient [facility])     Hemodialysis-associated hypotension 10/22/2021    History of heart failure     History of seizures     History of type C viral hepatitis     HTN (hypertension)     Hyperlipidemia     Impaired mobility and activities of daily living     Mediastinal lymphadenopathy     Eliazar Cook    Metabolic syndrome     Moderate persistent asthma without complication     Need for extended care facility 2021    Neurogenic urinary incontinence 2013    Neuropathy in diabetes [facility])     Nonrheumatic mitral valve regurgitation 2021    Nonrheumatic tricuspid valve regurgitation 2021    Obesity (BMI 30-39. 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 Ruby Gregg      x1 Dr. Héctor Farr, Dr Briseida Allen 2018   Mariza Virk  08/10/2021    [facility]    DIAGNOSTIC CARDIAC CATH LAB PROCEDURE  10/02/2019    HYSTERECTOMY, TOTAL ABDOMINAL      one ovary intact, Dr Violet Ocasio, menorrhagia    GA TOTAL KNEE ARTHROPLASTY Left 6/21/2018    LEFT KNEE TOTAL KNEE ARTHROPLASTY, SHAYNA, NERVE BLOCK performed by Maldonado Bazzi MD at [address] [place] [address] PTCA      TOE AMPUTATION Right     TOTAL KNEE ARTHROPLASTY  05/19/16    Dr Josiah Cooley TUNNELED [address] Right 07/01/2020    tunneled HD catheter per Dr Mynor Segura       Family History   Problem Relation Age of Onset    Cancer Mother 76        survived   [facility] Hypertension Father     Diabetes Sister     Mental Illness Sister        Social History     Socioeconomic History    Marital status:      Spouse name: None    Number of children: 2    Years of education: None    Highest education level: None   Occupational History    Occupation: disabled   Tobacco Use    Smoking status: Never Smoker    Smokeless tobacco: Never Used   Vaping Use    Vaping Use: Never used   Substance and Sexual Activity    Alcohol use: No     Alcohol/week: 0.0 standard drinks    Drug use: No    Sexual activity: Not Currently   Other Topics Concern    None   Social History Narrative    Born in [place], one of 5    Twin sister Reyes, very ill in 2018, [place] 2019    Moved to [facility] [facility], , 2 children, one son and one daughter    Worked at [employer], as a nurse's aide    Disabled due to mental illness    Lived at [facility], was discharged, returned to independent living in 2017 in the daughter's house and has adjusted well    One son and one daughter, live in the same house with patient, Maurice Grey pays the rent    [employer] reading ([facility])        10/11/2021 [facility] updates; patient lives with her daughter son-in-law and 2 grandchildren and patient's handicapped son. Per daughter, her brother is blind, MRDD, multiple health issues. Daughter's  is patient's legal guardian. Patient has hemodialysis Tuesday Thursday and Saturday. Patient's bedroom is on main floor with a half bath.   Daughter walks acid (NEPHROCAPS) 1 MG capsule Take 1 capsule by mouth daily      LANTUS SOLOSTAR 100 UNIT/ML injection pen 55 units at bedtime 10 pen 10    sertraline (ZOLOFT) 50 MG tablet TAKE 1 TABLET BY MOUTH DAILY 30 tablet 5    zoster recombinant adjuvanted vaccine (SHINGRIX) 50 MCG/0.5ML SUSR injection Inject 0.5 mLs into the muscle See Admin Instructions 1 dose now and repeat in 2-6 months 0.5 mL 0    metoprolol tartrate (LOPRESSOR) 25 MG tablet TAKE 1/2 TABLET BY MOUTH TWO TIMES DAILY  90 tablet 4    insulin aspart (NOVOLOG FLEXPEN) 100 UNIT/ML injection pen INJECT 8-10  units with each meals (Patient taking differently: Inject 0-8 Units into the skin 3 times daily (before meals) And per sliding scale fo 0-150=0 units; 151-200= 2units; 201-250= 4 units; 251-300=6units; 301-350=8 units; 351-400=1Call MD/  CNP) 10 pen 3    melatonin 10 MG CAPS capsule Take 1 capsule by mouth nightly 30 capsule 12    aspirin EC 81 MG EC tablet Take 1 tablet by mouth daily 30 tablet 12    magnesium oxide (MAG-OX) 400 MG tablet Take 1 tablet by mouth daily 90 tablet 4    atorvastatin (LIPITOR) 40 MG tablet Take 1 tablet by mouth daily 90 tablet 4    blood glucose test strips (FREESTYLE LITE) strip 1 each by Does not apply route 4 times daily (before meals and nightly) As needed. 200 strip 5    Alcohol Swabs (EASY TOUCH ALCOHOL PREP MEDIUM) 70 % PADS USE AS DIRECTED THREE TIMES A  each 3    FreeStyle Lancets MISC Test 4x daily 150 each 3    docusate sodium (COLACE, DULCOLAX) 100 MG CAPS Take 100 mg by mouth 2 times daily For the prevention and treatment of constipation while taking pain medications.  30 capsule 0    acetaminophen (TYLENOL) 325 MG tablet Take 2 tablets by mouth every 4 hours as needed for Pain (For mild to moderate pain (Pain 1-6 out of 10 on pain scale)) 120 tablet 0    Blood Glucose Monitoring Suppl (FREESTYLE LITE) CORETTA 1 Device by Does not apply route daily as needed (Diabetes) Use freestyle meter to test blood sugar as needed 1 Device 0    nitroGLYCERIN (NITROSTAT) 0.4 MG SL tablet Place 1 tablet under the tongue every 5 minutes as needed for Chest pain      midodrine (PROAMATINE) 5 MG tablet Take 5 mg by mouth three times a week Give prior to dialysis. (Patient not taking: Reported on 10/27/2021)       No current facility-administered medications for this visit. Review of Systems:   Review of Systems   Constitutional: Negative. Negative for diaphoresis and fatigue. HENT: Negative. Eyes: Negative. Respiratory: Negative. Negative for cough, chest tightness, shortness of breath, wheezing and stridor. Cardiovascular: Negative. Negative for chest pain, palpitations and leg swelling. Gastrointestinal: Negative. Negative for blood in stool and nausea. Genitourinary: Negative. Musculoskeletal: Negative. Skin: Negative. Neurological: Negative. Negative for dizziness, syncope, weakness and light-headedness. Hematological: Negative. Psychiatric/Behavioral: Negative. Physical Examination:    BP (!) 98/56 (Site: Left Upper Arm, Position: Sitting, Cuff Size: Large Adult)   Pulse 76   Resp 18   Ht 5' 7\" (1.702 m)   Wt 225 lb (102.1 kg)   LMP  (LMP Unknown)   SpO2 97%   BMI 35.24 kg/m²    Physical Exam   Constitutional: She appears healthy. No distress. HENT:   Normal cephalic and Atraumatic   Eyes: Pupils are equal, round, and reactive to light. Neck: Thyroid normal. No JVD present. No neck adenopathy. No thyromegaly present. Cardiovascular: Normal rate, regular rhythm, intact distal pulses and normal pulses. Murmur heard. Pulmonary/Chest: Effort normal and breath sounds normal. She has no wheezes. She has no rales. She exhibits no tenderness. Abdominal: Soft. Bowel sounds are normal. There is no abdominal tenderness. Musculoskeletal:         General: No tenderness or edema. Normal range of motion. Cervical back: Normal range of motion and neck supple. Neurological: She is alert and oriented to person, place, and time. Skin: Skin is warm. No cyanosis. Nails show no clubbing.        LABS:  CBC:   Lab Results   Component Value Date    WBC 9.2 10/11/2021    RBC 2.98 10/11/2021    HGB 9.7 10/11/2021    HCT 27.6 10/11/2021    MCV 92.6 10/11/2021    MCH 32.4 10/11/2021    MCHC 35.0 10/11/2021    RDW 16.0 10/11/2021     10/11/2021    MPV 10.0 03/05/2020     Lipids:  Lab Results   Component Value Date    CHOL 101 06/11/2020    CHOL 107 05/25/2020    CHOL 137 02/26/2019     Lab Results   Component Value Date    TRIG 82 06/11/2020    TRIG 85 05/25/2020    TRIG 116 02/26/2019     Lab Results   Component Value Date    HDL 48 06/11/2020    HDL 44 05/25/2020    HDL 55 02/26/2019     Lab Results   Component Value Date    LDLCALC 37 06/11/2020    LDLCALC 46 05/25/2020    LDLCALC 59 02/26/2019     Lab Results   Component Value Date    LABVLDL 59.0 05/04/2013    VLDL 43 01/30/2017     Lab Results   Component Value Date    CHOLHDLRATIO 4.32 01/30/2017     CMP:    Lab Results   Component Value Date     10/11/2021    K 4.1 10/11/2021    K 3.9 08/01/2021    CL 86 10/11/2021    CO2 23 10/11/2021    BUN 52 10/11/2021    CREATININE 5.66 10/11/2021    GFRAA 9.1 10/11/2021    LABGLOM 7.6 10/11/2021    GLUCOSE 320 10/11/2021    GLUCOSE 419 07/30/2021    PROT 7.0 10/11/2021    LABALBU 3.9 10/11/2021    CALCIUM 9.3 10/11/2021    BILITOT 0.5 10/11/2021    ALKPHOS 111 10/11/2021    AST 20 10/11/2021    ALT 16 10/11/2021     BMP:    Lab Results   Component Value Date     10/11/2021    K 4.1 10/11/2021    K 3.9 08/01/2021    CL 86 10/11/2021    CO2 23 10/11/2021    BUN 52 10/11/2021    LABALBU 3.9 10/11/2021    CREATININE 5.66 10/11/2021    CALCIUM 9.3 10/11/2021    GFRAA 9.1 10/11/2021    LABGLOM 7.6 10/11/2021    GLUCOSE 320 10/11/2021    GLUCOSE 419 07/30/2021     Magnesium:    Lab Results   Component Value Date    MG 2.1 02/24/2021     TSH:  Lab Results   Component Value Date    TSH 0.856 07/16/2021       Patient Active Problem List   Diagnosis    Atherosclerotic heart disease of native coronary artery with unspecified angina pectoris (HCC)    Schizophrenia, paranoid, chronic    Metabolic syndrome    Vitamin B 12 deficiency    Cerebral microvascular disease    Mixed hyperlipidemia    Other hammer toe (acquired)    Vitamin D insufficiency    Incontinence of urine    Diabetic nephropathy with proteinuria ([address]    Essential (primary) hypertension    History of type C viral hepatitis    Urinary incontinence due to cognitive impairment    History of seizures    Stented coronary artery-plan is to stay on Plavix indefinately per Dr Adrienne Salas Other specified diabetes mellitus with diabetic neuropathy, unspecified ([address]    Controlled type 2 diabetes mellitus with diabetic neuropathy, with long-term current use of insulin (HCC)    Hemiparesis, left ([facility]    Angina, class II ([address]    Pain, unspecified    Tardive dyskinesia    Shortness of breath    Chronic diastolic congestive heart failure ([facility])    Sleep apnea, unspecified    Pulmonary hypertension, unspecified ([facility])    Class 2 severe obesity with serious comorbidity and body mass index (BMI) of 36.0 to 36.9 in adult ([facility])    Edema    Closed supracondylar fracture of right humerus    Other chronic pain    Palliative care patient    Recurrent falls    Renal failure    Difficulty in walking    ESRD (end stage renal disease) on dialysis (HCC)    Weakness    Other seizures (HCC)    Moderate persistent asthma without complication    NPHBV-15    Post PTCA    Falls frequently    Chest wall contusion, left, initial encounter    Headache, unspecified    Paranoid schizophrenia ([address]    Compression fracture of spine (HCC)    Closed rib fracture    Depression    Chronic obstructive pulmonary disease ([address]    Critical illness polyneuropathy ([address]    Multiple closed fractures of ribs of right side    Nonrheumatic mitral valve regurgitation    Nonrheumatic tricuspid valve regurgitation    Need for extended care facility    Chronic pain of both shoulders    Hemodialysis-associated hypotension       Medications Discontinued During This Encounter   Medication Reason    isosorbide mononitrate (IMDUR) 120 MG extended release tablet        Modified Medications    No medications on file       No orders of the defined types were placed in this encounter. Assessment/Plan:    1. Coronary artery disease involving native heart with angina pectoris, unspecified vessel or lesion type ([facility]  CP/Angina- SPect then RTO     2. Diastolic congestive heart failure, unspecified HF chronicity (HCC)  Stable. No edema. - continue meds. Low salt diet    3. Stented coronary artery    4. Shortness of breath   stable - no worse    5. Mixed hyperlipidemia  Statin - long term. Check labs. Low fat diet. 6. Essential hypertension BP low- stop Imdur 120 for now. 7. Coronary artery disease involving native coronary artery of native heart with angina pectoris ([facility]    8 dizzy/falls- CUS. Counseling:  Heart Healthy Lifestyle, Low Salt Diet, Take Precautions to Prevent Falls and Walk Daily    Return for AFTER TESTS.       Electronically signed by Brooklyn Villalba MD on 10/27/2021 at 2:02 PM

## 2021-10-27 NOTE — TELEPHONE ENCOUNTER
Aaron Haji the care coordinator called in regards to patient getting a wheelchair. She said that the office notes need to be adended. It needs to include that patient uses her walker but she is still falling inside and outside of the home and this is a safety concern for patient. With the wheelchair patient can safely propel herself and family can assist. Also the wheelchair will assist with her ADL,hygenie,dressing & meal prep. The wheelchair will also help patient get to doctor appointments and her hemodyalisis safely. Also Aaron Haji states that there is no order or Rx put into the computer for the wheelchair and Greg needs a copy of this.      It should be attentioned to [facility] and her fax number is [phone]

## 2021-10-28 ENCOUNTER — CARE COORDINATION (OUTPATIENT)
Dept: CARE COORDINATION | Age: 61
End: 2021-10-28

## 2021-10-28 NOTE — TELEPHONE ENCOUNTER
ACM spoke to [facility] physical therapy and updated them on PCPs orders. ACM was advised that Medicare will not pay for patient to go to [facility] PT considering patient is active with [facility]. [facility] will attempt to run wheelchair through [employer] with information already provided. If denied they will contact PCP. ACM called hemodialysis. when patient comes to their facility she can use facility wheelchair to get in and out of building. ACM called patient and advised her of the above. ACM supplied additional resources to obtain a transport chair. Patient verbalized understanding.

## 2021-10-29 ENCOUNTER — TELEPHONE (OUTPATIENT)
Dept: FAMILY MEDICINE CLINIC | Age: 61
End: 2021-10-29

## 2021-10-29 ENCOUNTER — TELEPHONE (OUTPATIENT)
Dept: CARDIOLOGY CLINIC | Age: 61
End: 2021-10-29

## 2021-10-29 DIAGNOSIS — F20.0 SCHIZOPHRENIA, PARANOID, CHRONIC (HCC): ICD-10-CM

## 2021-10-29 NOTE — TELEPHONE ENCOUNTER
She was reporting to you her Blood pressure this morning was 80/50. She did not notice any associated symptoms with the low blood pressure. She did go ahead and take all her normal medications and went to cardiac Rehab. She noted when she got home that her BP was around 101/60. She denies any dizziness or lightheadedness but is reporting since you had advised her to report BP with systolice < 742. Thank you.

## 2021-11-01 DIAGNOSIS — F20.0 SCHIZOPHRENIA, PARANOID, CHRONIC (HCC): ICD-10-CM

## 2021-11-01 RX ORDER — MIDODRINE HYDROCHLORIDE 10 MG/1
10 TABLET ORAL EVERY MORNING
Qty: 30 TABLET | Refills: 5 | Status: SHIPPED | OUTPATIENT
Start: 2021-11-01 | End: 2021-12-16 | Stop reason: SDUPTHER

## 2021-11-01 NOTE — TELEPHONE ENCOUNTER
Pharmacy requesting medication refill.  Please approve or deny this request.    Rx requested:  Requested Prescriptions     Pending Prescriptions Disp Refills    sertraline (ZOLOFT) 50 MG tablet 30 tablet 5         Last Office Visit:   10/22/2021      Next Visit Date:  Future Appointments   Date Time Provider Aminata Quinterosi   11/10/2021  1:40 PM [place] MAMMO ROOM 3 [facility] WOMENS [facility] Fac RAD   11/15/2021  2:00 PM Page Duong MD [address]   12/1/2021 10:00 AM [place] NUC MED INJECTION ROOM 1 [facility] NUC MED [facility] Fac RAD   12/1/2021 10:30 AM [place] ULTRASOUND 3 [facility] ULTRA [facility] Fac RAD   12/1/2021 11:00 AM [place] NUCLEAR MEDICINE ROOM 1 [facility] NUC MED [facility] Fac RAD   12/1/2021 11:30 AM [facility] STRESS ROOM 1 [facility] STRESS [facility] Fac RAD   12/1/2021 12:30 PM [place] NUCLEAR MEDICINE ROOM 1 [facility] NUC MED [facility] Fac RAD   12/13/2021  2:15 PM Andrei Britt MD [address] [place] [address]   12/13/2021  3:15 PM Jan Marquez  [facility]   12/20/2021 12:30 PM Michael Kessler MD [facility]   5/10/2022 12:00 PM Michael Kessler MD [address] [place] [address]

## 2021-11-01 NOTE — TELEPHONE ENCOUNTER
The patient is aware and the medication was called to the local pharmacy. She is to continue to track her blood pressures and keep us updated.

## 2021-11-01 NOTE — TELEPHONE ENCOUNTER
Patient calling. She was at dialysis today and then turned her off and sent her home because her BP was under 100. She is asymptomatic but dialysis wants this addressed.

## 2021-11-02 ENCOUNTER — CARE COORDINATION (OUTPATIENT)
Dept: CARE COORDINATION | Age: 61
End: 2021-11-02

## 2021-11-02 DIAGNOSIS — F20.0 SCHIZOPHRENIA, PARANOID, CHRONIC (HCC): Primary | ICD-10-CM

## 2021-11-02 RX ORDER — SERTRALINE HYDROCHLORIDE 100 MG/1
100 TABLET, FILM COATED ORAL DAILY
Qty: 90 TABLET | Refills: 1 | Status: SHIPPED | OUTPATIENT
Start: 2021-11-02 | End: 2021-12-13 | Stop reason: ALTCHOICE

## 2021-11-02 NOTE — CARE COORDINATION
Contacted [address] and left voicemail regarding Dietitian follow up. Left call back number. RD will continue to follow/assist with patient return call.        [address] [facility], [address] [place] [address]

## 2021-11-04 ENCOUNTER — CARE COORDINATION (OUTPATIENT)
Dept: CARE COORDINATION | Age: 61
End: 2021-11-04

## 2021-11-04 NOTE — CARE COORDINATION
ACM called patient. Left message requesting a return call for [facility] out reach. Contact information supplied.

## 2021-11-05 ENCOUNTER — CARE COORDINATION (OUTPATIENT)
Dept: CARE COORDINATION | Age: 61
End: 2021-11-05

## 2021-11-05 NOTE — CARE COORDINATION
ACM called patient. Left message requesting a return call for a cc out reach. Contact information supplied.

## 2021-11-09 ENCOUNTER — CARE COORDINATION (OUTPATIENT)
Dept: CARE COORDINATION | Age: 61
End: 2021-11-09

## 2021-11-09 NOTE — CARE COORDINATION
Ambulatory Care Coordination Note  11/9/2021  CM Risk Score: 17  Charlson 10 Year Mortality Risk Score: 100%     ACC: Frankey Evangelist, RN    [facility]. AC called daughter. Daughter states patient is doing fine. She just left via the bus to her hemodialysis. Patient states she has not obtained the wheelchair. She is not sure what the status is with [facility] and [address] [place] [address]. AC encouraged daughter to have patient return call to discuss any other additional needs. Daughter states there has been no recent falls. AC will continue to attempt out reach to patient. Daughter could not update AC on diabetes COPD or CHF status. Care Coordination Interventions    Program Enrollment: Complex Care  Referral from Primary Care Provider: No  Suggested Interventions and Community Resources  Fall Risk Prevention: Not Started  Home Care Waiver: Completed (Comment: per daughter she is paid caregiver for patient)  Palliative Care: Not Started  Pharmacist: Declined  Physical Therapy: Completed (Comment: active with [facility] out patient )  Registered Dietician: In Process  Other Services: Completed (Comment: walk in t/l )  Zone Management Tools: Completed (Comment: copd, dm chf zones )         Goals Addressed    None         Prior to Admission medications    Medication Sig Start Date End Date Taking?  Authorizing Provider   sertraline (ZOLOFT) 100 MG tablet Take 1 tablet by mouth daily 11/2/21   Mahesh Delgado MD   midodrine (PROAMATINE) 10 MG tablet Take 1 tablet by mouth every morning 11/1/21   Lorna Banda MD   pantoprazole (PROTONIX) 20 MG tablet TAKE 1 TABLET BY MOUTH DAILY 10/26/21   Lorna Banda MD   BRILINTA 90 MG TABS tablet TAKE ONE(1) TABLET TWICE DAILY 10/26/21   Lorna Banda MD   ARIPiprazole (ABILIFY) 5 MG tablet TAKE 1 TABLET BY MOUTH ONCE DAILY 10/22/21   Mahesh Delgado MD   pregabalin (LYRICA) 75 MG capsule Take 1 capsule by mouth 2 times daily for 30 days. Take 75 mg by mouth 2 times daily. 10/22/21 11/21/21  Audrey Balderrama MD   Insulin Pen Needle (SURE COMFORT PEN NEEDLES) 30G X 8 MM MISC USE AS DIRECTED FIVE TIMES A DAILY 10/20/21   Иван Bosch MD   ranolazine (RANEXA) 500 MG extended release tablet TAKE 1 TABLET BY MOUTH TWICE DAILY 10/15/21   Ryanne Garcia MD   JANUVIA 50 MG tablet TAKE 1 TABLET BY MOUTH EVERY DAY 10/3/21   DO brad Villaseñor complex-C-folic acid (NEPHROCAPS) 1 MG capsule Take 1 capsule by mouth daily    Historical Provider, MD   LANADILENE SOLOSTAR 100 UNIT/ML injection pen 55 units at bedtime 10/8/21   Иван Bosch MD   zoster recombinant adjuvanted vaccine [facility]) 50 MCG/0.5ML SUSR injection Inject 0.5 mLs into the muscle See Admin Instructions 1 dose now and repeat in 2-6 months 9/17/21 3/16/22  Audrye Balderrama MD   metoprolol tartrate (LOPRESSOR) 25 MG tablet TAKE 1/2 TABLET BY MOUTH TWO TIMES DAILY  9/17/21   Audrey Balderrama MD   midodrine (PROAMATINE) 5 MG tablet Take 5 mg by mouth three times a week Give prior to dialysis.   Patient not taking: Reported on 10/27/2021    Historical Provider, MD   insulin aspart (NOVOLOG FLEXPEN) 100 UNIT/ML injection pen INJECT 8-10  units with each meals  Patient taking differently: Inject 0-8 Units into the skin 3 times daily (before meals) And per sliding scale fo 0-150=0 units; 151-200= 2units; 201-250= 4 units; 251-300=6units; 301-350=8 units; 351-400=1Call MD/  CNP 6/28/21   Иван Bosch MD   melatonin 10 MG CAPS capsule Take 1 capsule by mouth nightly 6/23/21   Audrey Balderrama MD   aspirin EC 81 MG EC tablet Take 1 tablet by mouth daily 5/25/21   Audrey Balderrama MD   magnesium oxide (MAG-OX) 400 MG tablet Take 1 tablet by mouth daily 4/28/21   Audrey Balderrama MD   atorvastatin (LIPITOR) 40 MG tablet Take 1 tablet by mouth daily 4/28/21   Audrey Balderrama MD   blood glucose test strips (FREESTYLE LITE) strip 1 each by Does not apply route 4 times daily (before meals and nightly) As needed. 3/12/21   MARCELO Deleon   Alcohol Swabs (EASY TOUCH ALCOHOL PREP MEDIUM) 70 % PADS USE AS DIRECTED THREE TIMES A DAY 3/12/21   MARCELO Deleon   FreeStyle Lancets MISC Test 4x daily 3/11/21   MARCELO Deleon   docusate sodium (COLACE, DULCOLAX) 100 MG CAPS Take 100 mg by mouth 2 times daily For the prevention and treatment of constipation while taking pain medications.  2/24/21   Lena Dixon PA-C   acetaminophen (TYLENOL) 325 MG tablet Take 2 tablets by mouth every 4 hours as needed for Pain (For mild to moderate pain (Pain 1-6 out of 10 on pain scale)) 2/24/21   Lena Dixon PA-C   Blood Glucose Monitoring Suppl (FREESTYLE LITE) CORETTA 1 Device by Does not apply route daily as needed (Diabetes) Use freestyle meter to test blood sugar as needed 12/29/20   MARCELO Deleon   nitroGLYCERIN (NITROSTAT) 0.4 MG SL tablet Place 1 tablet under the tongue every 5 minutes as needed for Chest pain 9/22/15   Historical Provider, MD       Future Appointments   Date Time Provider Aminata Cortse   11/10/2021  1:40 PM [facility] MAMMO ROOM 3 [facility]ENS [facility] Fac RAD   11/15/2021  2:00 PM Eric Gray MD [address]   12/1/2021 10:00 AM [facility] NUC MED INJECTION ROOM 1 [facility] NUC MED [facility] Fac RAD   12/1/2021 10:30 AM [facility] ULTRASOUND 3 [facility] ULTRA [facility] Fac RAD   12/1/2021 11:00 AM [facility] NUCLEAR MEDICINE ROOM 1 [facility] NUC MED [facility] Fac RAD   12/1/2021 11:30 AM [facility] STRESS ROOM 1 [facility] STRESS [facility] Fac RAD   12/1/2021 12:30 PM [facility]UCLEAR MEDICINE ROOM 1 [facility] NUC MED [facility] Fac RAD   12/13/2021  2:15 PM Andrei Bryant  [address]   12/13/2021  3:15 PM [place] [id number]   12/20/2021 12:30 PM Valeri Baltazar MD [facility]   5/10/2022 12:00 PM Valeri Baltazar MD [address] [place] [address]

## 2021-11-10 ENCOUNTER — HOSPITAL ENCOUNTER (OUTPATIENT)
Dept: WOMENS IMAGING | Age: 61
Discharge: HOME | End: 2021-11-12
Payer: MEDICARE

## 2021-11-10 ENCOUNTER — CARE COORDINATION (OUTPATIENT)
Dept: CARE COORDINATION | Age: 61
End: 2021-11-10

## 2021-11-10 DIAGNOSIS — Z12.31 ENCOUNTER FOR SCREENING MAMMOGRAM FOR BREAST CANCER: ICD-10-CM

## 2021-11-10 PROCEDURE — 77063 BREAST TOMOSYNTHESIS BI: CPT

## 2021-11-10 ASSESSMENT — ENCOUNTER SYMPTOMS: DYSPNEA ASSOCIATED WITH: EXERTION

## 2021-11-10 NOTE — CARE COORDINATION
Ambulatory Care Coordination Note  11/10/2021  CM Risk Score: 17  [place] Mortality Risk Score: [unfilled]    ACC: Shun George RN    Summary Note: AC spoke to patient. She reports doing well overall. Patient states she is not moving forward with the wheelchair at this time. Patient states she received a referral for external ambulatory psychiatrist.  Patient states she had [facility] in the past and was discharged from them. Patient states she would rather have Dr. Brennen Aaron. Per patient she googled her and wants to stay with [facility]. Patient was provided phone number to call and get set up with establishment of care. Per patient she is not able to get through. AC called and left message for behavioral health to call to arrange establishment of care if acceptable. Patient advised ACM that starting yesterday and today her urine is dark. She denies any pain or burning with urination. Patient states she is on limited fluid restrictions. AC advised patient to call Dr. Selina Pedersen and advise and follow his instructions. Patient states she will call now. Patient states diabetes COPD and CHF are all well managed. Today patient denies any chest pain SOB or edema. Patient states she has no open wounds and has had no hypoglycemic events. [facility] reviewed COPD and CHF education.         Care Coordination Interventions    Program Enrollment: Complex Care  Referral from Primary Care Provider: No  Suggested Interventions and Community Resources  [facility] (Comment: PATIENT HAS REFERRAL )  Fall Risk Prevention: Completed  Home Care Waiver: Completed (Comment: per daughter she is paid caregiver for patient)  Palliative Care: Not Started  Pharmacist: Declined  Physical Therapy: Completed (Comment: active with [facility] out patient )  Registered Dietician: Completed  Other Services: Completed (Comment: walk in t/l )  Zone Management Tools: Completed (Comment: copd, dm chf zones )         Goals Addressed This Visit's Progress     Conditions and Symptoms   No change     I will follow my Zone Management tool to seek urgent or emergent care. I will notify my provider of any symptoms that indicate a worsening of my condition. Barriers: impairment:  cognitive and overwhelmed by complexity of regimen  Plan for overcoming my barriers: ACM engagement, daughter support and provider poc compliance   Confidence: 6/10  Anticipated Goal Completion Date: 1/21/2022              Prior to Admission medications    Medication Sig Start Date End Date Taking? Authorizing Provider   sertraline (ZOLOFT) 100 MG tablet Take 1 tablet by mouth daily 11/2/21   Michele Storey MD   midodrine (PROAMATINE) 10 MG tablet Take 1 tablet by mouth every morning 11/1/21   Russell Romo MD   pantoprazole (PROTONIX) 20 MG tablet TAKE 1 TABLET BY MOUTH DAILY 10/26/21   Russell Romo MD   BRILINTA 90 MG TABS tablet TAKE ONE(1) TABLET TWICE DAILY 10/26/21   Russell Romo MD   ARIPiprazole (ABILIFY) 5 MG tablet TAKE 1 TABLET BY MOUTH ONCE DAILY 10/22/21   Michele Storey MD   pregabalin (LYRICA) 75 MG capsule Take 1 capsule by mouth 2 times daily for 30 days. Take 75 mg by mouth 2 times daily.  10/22/21 11/21/21  Michele Storey MD   Insulin Pen Needle (SURE COMFORT PEN NEEDLES) 30G X 8 MM MISC USE AS DIRECTED FIVE TIMES A DAILY 10/20/21   Hubert Nails MD   ranolazine (RANEXA) 500 MG extended release tablet TAKE 1 TABLET BY MOUTH TWICE DAILY 10/15/21   Russell Romo MD   JANUVIA 50 MG tablet TAKE 1 TABLET BY MOUTH EVERY DAY 10/3/21   Sherren Mayans, DO b complex-C-folic acid (NEPHROCAPS) 1 MG capsule Take 1 capsule by mouth daily    Historical Provider, MD   LANTUS SOLOSTAR 100 UNIT/ML injection pen 55 units at bedtime 10/8/21   Hubert Nails MD   zoster recombinant adjuvanted vaccine [facility]) 50 MCG/0.5ML SUSR injection Inject 0.5 mLs into the muscle See Admin Instructions 1 dose now and repeat in 2-6 months 9/17/21 3/16/22  Domenic Cruz MD   metoprolol tartrate (LOPRESSOR) 25 MG tablet TAKE 1/2 TABLET BY MOUTH TWO TIMES DAILY  9/17/21   Domenic Cruz MD   midodrine (PROAMATINE) 5 MG tablet Take 5 mg by mouth three times a week Give prior to dialysis. Patient not taking: Reported on 10/27/2021    Historical Provider, MD   insulin aspart (NOVOLOG FLEXPEN) 100 UNIT/ML injection pen INJECT 8-10  units with each meals  Patient taking differently: Inject 0-8 Units into the skin 3 times daily (before meals) And per sliding scale fo 0-150=0 units; 151-200= 2units; 201-250= 4 units; 251-300=6units; 301-350=8 units; 351-400=1Call MD/  CNP 6/28/21   Jose Manuel Ornelas MD   melatonin 10 MG CAPS capsule Take 1 capsule by mouth nightly 6/23/21   Domenic Cruz MD   aspirin EC 81 MG EC tablet Take 1 tablet by mouth daily 5/25/21   Domenic Cruz MD   magnesium oxide (MAG-OX) 400 MG tablet Take 1 tablet by mouth daily 4/28/21   Domenic Cruz MD   atorvastatin (LIPITOR) 40 MG tablet Take 1 tablet by mouth daily 4/28/21   Domenic Cruz MD   blood glucose test strips (FREESTYLE LITE) strip 1 each by Does not apply route 4 times daily (before meals and nightly) As needed. 3/12/21   MARCELO Clement   Alcohol Swabs (EASY TOUCH ALCOHOL PREP MEDIUM) 70 % PADS USE AS DIRECTED THREE TIMES A DAY 3/12/21   MARCELO Clement   FreeStyle Lancets MISC Test 4x daily 3/11/21   MARCELO Clement   docusate sodium (COLACE, DULCOLAX) 100 MG CAPS Take 100 mg by mouth 2 times daily For the prevention and treatment of constipation while taking pain medications.  2/24/21   Lena Dixon PA-C   acetaminophen (TYLENOL) 325 MG tablet Take 2 tablets by mouth every 4 hours as needed for Pain (For mild to moderate pain (Pain 1-6 out of 10 on pain scale)) 2/24/21   Lena Dixon PA-C   Blood Glucose Monitoring Suppl (FREESTYLE LITE) CORETTA 1 Device by Does not apply route

## 2021-11-22 ENCOUNTER — TELEPHONE (OUTPATIENT)
Dept: CARDIOLOGY CLINIC | Age: 61
End: 2021-11-22

## 2021-11-22 ENCOUNTER — CARE COORDINATION (OUTPATIENT)
Dept: CARE COORDINATION | Age: 61
End: 2021-11-22

## 2021-11-22 ASSESSMENT — ENCOUNTER SYMPTOMS: DYSPNEA ASSOCIATED WITH: MINIMAL EXERTION

## 2021-11-22 NOTE — TELEPHONE ENCOUNTER
Pt called from dialysis stating her BP is 80/48. She states this always happens when she's in dialysis. She would like to know what she can do.  Please advise, thanks

## 2021-11-22 NOTE — CARE COORDINATION
Ambulatory Care Coordination Note  11/22/2021  CM Risk Score: 17  [place] Mortality Risk Score: [unfilled]    ACC: Delmi Lr, RN    Summary Note: ACM spoke to patient. She is currently receiving her hemodialysis. Patient states she has not heard back regarding her urine. Patient states she has not called to get results of her urine. ACM called Dr. Ramona Frazier  office and requested they contact the patient to review results. Patient states that her urine still is dark and has a foul odor. ACM reviewed diabetes COPD and CHF. ACM focus this encounter on CHF holiday guidelines. ACM reviewed CHF zones, heart failure reminders and CHF food list.  Patient was advised printed material has been mailed to her home regarding CHF during the holidays. Patient states her blood sugar was high yesterday 300 but today 200. Denied any current chest pain, shortness of breath, no recent falls, no open sores or wounds. Michelle received counseling on the following healthy behaviors: SELF MANAGEMENT of chronic health conditions,with goal to improve quality of life and overall wellbeing. The patient is asked to make an attempt to improve diet and exercise patterns to aid in medical management. Patient given educational materials on chf holiday tools     I have instructed Michelle to complete a self tracking handout on Blood Sugars , Blood Pressures  and Weights and instructed them to bring it with them to her next appointment. Discussed use, benefit, and side effects of prescribed medications. Barriers to medication compliance addressed. All patient questions answered. Pt voiced understanding. The importance of daily weights, dietary sodium restriction, and contact with cardiology if weight is increased more than 3 lbs in any 48 hour period was stressed. The patient has been advised to contact us if they experience progressive SOB, orthopnea, PND or progressive edema.   Diabetes Assessment    Medic Alert ID: No  Meal daughter she is paid caregiver for patient)  Palliative Care: Not Started  Pharmacist: Declined  Physical Therapy: Completed (Comment: active with [facility] out patient )  Registered Dietician: Completed  Other Services: Completed (Comment: walk in t/l )  Zone Management Tools: Completed (Comment: copd, dm chf zones )         Goals Addressed                 This Visit's Progress     Conditions and Symptoms   Improving     I will follow my Zone Management tool to seek urgent or emergent care. I will notify my provider of any symptoms that indicate a worsening of my condition. Barriers: impairment:  cognitive and overwhelmed by complexity of regimen  Plan for overcoming my barriers: ACM engagement, daughter support and provider poc compliance   Confidence: 6/10  Anticipated Goal Completion Date: 1/21/2022              Prior to Admission medications    Medication Sig Start Date End Date Taking? Authorizing Provider   sertraline (ZOLOFT) 100 MG tablet Take 1 tablet by mouth daily 11/2/21   Fantasma Call MD   midodrine (PROAMATINE) 10 MG tablet Take 1 tablet by mouth every morning 11/1/21   Tera Pacheco MD   pantoprazole (PROTONIX) 20 MG tablet TAKE 1 TABLET BY MOUTH DAILY 10/26/21   Tera Pacheco MD   BRILINTA 90 MG TABS tablet TAKE ONE(1) TABLET TWICE DAILY 10/26/21   Tera Pacheco MD   ARIPiprazole (ABILIFY) 5 MG tablet TAKE 1 TABLET BY MOUTH ONCE DAILY 10/22/21   aFntasma Call MD   pregabalin (LYRICA) 75 MG capsule Take 1 capsule by mouth 2 times daily for 30 days. Take 75 mg by mouth 2 times daily.  10/22/21 11/21/21  Fantasma Call MD   Insulin Pen Needle (SURE COMFORT PEN NEEDLES) 30G X 8 MM MISC USE AS DIRECTED FIVE TIMES A DAILY 10/20/21   Sammie Mcconnell MD   ranolazine (RANEXA) 500 MG extended release tablet TAKE 1 TABLET BY MOUTH TWICE DAILY 10/15/21   Tera Pacheco MD   JANUVIA 50 MG tablet TAKE 1 TABLET BY MOUTH EVERY DAY 10/3/21   DO brad Julio complex-C-folic acid (NEPHROCAPS) 1 MG capsule Take 1 capsule by mouth daily    Historical Provider, MD   LANADILENE SOLOSTAR 100 UNIT/ML injection pen 55 units at bedtime 10/8/21   Capo Retana MD   zoster recombinant adjuvanted vaccine [facility]) 50 MCG/0.5ML SUSR injection Inject 0.5 mLs into the muscle See Admin Instructions 1 dose now and repeat in 2-6 months 9/17/21 3/16/22  Valeria Dumas MD   metoprolol tartrate (LOPRESSOR) 25 MG tablet TAKE 1/2 TABLET BY MOUTH TWO TIMES DAILY  9/17/21   Valeria Dumas MD   midodrine (PROAMATINE) 5 MG tablet Take 5 mg by mouth three times a week Give prior to dialysis. Patient not taking: Reported on 10/27/2021    Historical Provider, MD   insulin aspart (NOVOLOG FLEXPEN) 100 UNIT/ML injection pen INJECT 8-10  units with each meals  Patient taking differently: Inject 0-8 Units into the skin 3 times daily (before meals) And per sliding scale fo 0-150=0 units; 151-200= 2units; 201-250= 4 units; 251-300=6units; 301-350=8 units; 351-400=1Call MD/  CNP 6/28/21   Capo Retana MD   melatonin 10 MG CAPS capsule Take 1 capsule by mouth nightly 6/23/21   Valeria Dumas MD   aspirin EC 81 MG EC tablet Take 1 tablet by mouth daily 5/25/21   Valeria Dumas MD   magnesium oxide (MAG-OX) 400 MG tablet Take 1 tablet by mouth daily 4/28/21   Valeria Dumas MD   atorvastatin (LIPITOR) 40 MG tablet Take 1 tablet by mouth daily 4/28/21   Valeria Dumas MD   blood glucose test strips (FREESTYLE LITE) strip 1 each by Does not apply route 4 times daily (before meals and nightly) As needed.  3/12/21   MARCELO Vargas   Alcohol Swabs (EASY TOUCH ALCOHOL PREP MEDIUM) 70 % PADS USE AS DIRECTED THREE TIMES A DAY 3/12/21   MARCELO Vargas   FreeStyle Lancets MISC Test 4x daily 3/11/21   MARCELO Vargas   docusate sodium (COLACE, DULCOLAX) 100 MG CAPS Take 100 mg by mouth 2 times daily For the prevention and treatment of constipation while taking pain medications.  2/24/21   Lena Dxion PA-C   acetaminophen (TYLENOL) 325 MG tablet Take 2 tablets by mouth every 4 hours as needed for Pain (For mild to moderate pain (Pain 1-6 out of 10 on pain scale)) 2/24/21   Lena Dixon PA-C   Blood Glucose Monitoring Suppl (FREESTYLE LITE) CORETTA 1 Device by Does not apply route daily as needed (Diabetes) Use freestyle meter to test blood sugar as needed 12/29/20   MARCELO Moreau   nitroGLYCERIN (NITROSTAT) 0.4 MG SL tablet Place 1 tablet under the tongue every 5 minutes as needed for Chest pain 9/22/15   Historical Provider, MD       Future Appointments   Date Time Provider Aminata Cortes   12/1/2021 10:00 AM [facility] INJECTION ROOM 1 [facility] NUC MED [facility] Fac RAD   12/1/2021 10:30 AM [facility] ULTRASOUND 3 [facility] ULTRA [facility] Fac RAD   12/1/2021 11:00 AM [facility] NUCLEAR MEDICINE ROOM 1 [facility] NUC MED [facility] Fac RAD   12/1/2021 11:30 AM [facility] STRESS ROOM 1 [facility] STRESS [facility] Fac RAD   12/1/2021 12:30 PM [facility] NUCLEAR MEDICINE ROOM 1 [facility] NUC MED [facility] Fac RAD   12/13/2021  2:15 PM Andrei Camacho  [address]   12/13/2021  3:15 PM Joel Flores, [address] [place] [address] 15   12/14/2021  9:00 AM Freddie Koch, [address] [facility]   12/20/2021 12:30 PM Chidi Painter MD [facility]   5/10/2022 12:00 PM Chidi Painter MD [address] [place] [address]

## 2021-12-01 ENCOUNTER — HOSPITAL ENCOUNTER (OUTPATIENT)
Dept: ULTRASOUND IMAGING | Age: 61
Discharge: HOME | End: 2021-12-03
Payer: MEDICARE

## 2021-12-01 ENCOUNTER — HOSPITAL ENCOUNTER (OUTPATIENT)
Dept: NON INVASIVE DIAGNOSTICS | Age: 61
Discharge: HOME | End: 2021-12-01
Payer: MEDICARE

## 2021-12-01 ENCOUNTER — HOSPITAL ENCOUNTER (OUTPATIENT)
Dept: NUCLEAR MEDICINE | Age: 61
Discharge: HOME | End: 2021-12-03
Payer: MEDICARE

## 2021-12-01 DIAGNOSIS — R07.9 CHEST PAIN, UNSPECIFIED TYPE: ICD-10-CM

## 2021-12-01 DIAGNOSIS — R42 DIZZY: ICD-10-CM

## 2021-12-01 DIAGNOSIS — R06.02 SHORTNESS OF BREATH: ICD-10-CM

## 2021-12-01 PROCEDURE — A9502 TC99M TETROFOSMIN: HCPCS | Performed by: INTERNAL MEDICINE

## 2021-12-01 PROCEDURE — 78452 HT MUSCLE IMAGE SPECT MULT: CPT

## 2021-12-01 PROCEDURE — 6360000002 HC RX W HCPCS: Performed by: INTERNAL MEDICINE

## 2021-12-01 PROCEDURE — 2580000003 HC RX 258: Performed by: INTERNAL MEDICINE

## 2021-12-01 PROCEDURE — 3430000000 HC RX DIAGNOSTIC RADIOPHARMACEUTICAL: Performed by: INTERNAL MEDICINE

## 2021-12-01 PROCEDURE — 93880 EXTRACRANIAL BILAT STUDY: CPT

## 2021-12-01 PROCEDURE — 93880 EXTRACRANIAL BILAT STUDY: CPT | Performed by: INTERNAL MEDICINE

## 2021-12-01 PROCEDURE — 93017 CV STRESS TEST TRACING ONLY: CPT

## 2021-12-01 RX ORDER — SODIUM CHLORIDE 0.9 % (FLUSH) 0.9 %
10 SYRINGE (ML) INJECTION PRN
Status: DISCONTINUED | OUTPATIENT
Start: 2021-12-01 | End: 2021-12-04 | Stop reason: HOSPADM

## 2021-12-01 RX ADMIN — SODIUM CHLORIDE, PRESERVATIVE FREE 10 ML: 5 INJECTION INTRAVENOUS at 10:05

## 2021-12-01 RX ADMIN — REGADENOSON 0.4 MG: 0.08 INJECTION, SOLUTION INTRAVENOUS at 11:33

## 2021-12-01 RX ADMIN — SODIUM CHLORIDE, PRESERVATIVE FREE 10 ML: 5 INJECTION INTRAVENOUS at 11:33

## 2021-12-01 RX ADMIN — TETROFOSMIN 11.3 MILLICURIE: 1.38 INJECTION, POWDER, LYOPHILIZED, FOR SOLUTION INTRAVENOUS at 10:05

## 2021-12-01 RX ADMIN — SODIUM CHLORIDE, PRESERVATIVE FREE 10 ML: 5 INJECTION INTRAVENOUS at 11:34

## 2021-12-01 RX ADMIN — TETROFOSMIN 31.6 MILLICURIE: 1.38 INJECTION, POWDER, LYOPHILIZED, FOR SOLUTION INTRAVENOUS at 11:33

## 2021-12-01 NOTE — PROGRESS NOTES
Reviewed history, allergies, and medications. Patient held her home medications prior to testing. Consent confirmed. Lexiscan exam explained. Placed patient on monitor. @ [address] here to inject [place]. SOB noted during recovery phase. Denied chest pain. No ectopy noted. Patient off monitor and instructed to eat, will have last part of exam in 1 hour.

## 2021-12-02 NOTE — PROCEDURES
[address]                      [address], [address] [facility]                              CARDIAC STRESS TEST    PATIENT NAME: Anson Saint                  :        1960  MED REC NO:   [id number]                            ROOM:  ACCOUNT NO:   [de-identified]                           ADMIT DATE: 2021  PROVIDER:     Daly Howell DO    CARDIOVASCULAR DIAGNOSTIC DEPARTMENT    DATE OF STUDY:  2021    Barbara Romeo MYOCARDIAL PERFUSION STRESS TEST    ORDERING PROVIDER:  Alyce Crowe MD    PRIMARY CARE PROVIDER:  Sherrie James MD    EXAM TYPE:  Stress Myocardial Perfusion Regadenosin 1-day. REASON FOR EXAM:  Chest pain and CAD. PROCEDURE DESCRIPTION:  The patient was injected intravenously at rest  with technetium-99m tetrofosmin followed by resting SPECT myocardial  perfusion imaging and then underwent stress protocol using regadenoson  0.4 mg injected intravenously. At that time, technetium-99m tetrofosmin  stress dose was injected intravenously and SPECT myocardial perfusion  and gated imaging were repeated. Rest dose:  11.3 mCi  Stress dose:  31.6 mCi    FINDINGS:  The stress and rest images exhibit homogeneous uptake of  tracer throughout the left ventricular myocardium. There is no evidence  of stress-induced reversible perfusion abnormalities to suggest  myocardial ischemia. Gated imaging exhibits normal left ventricular  size and normal wall motion and myocardial thickening. EKG analysis did  not demonstrate ST-segment changes nor arrhythmias concerning for  myocardial ischemia. There is breast attenuation artifact noted on the rotating raw planar  images. There is a fixed defect in the [id number] anterior wall on  rest and stress images. No evidence of any reversible perfusion defect. LVEF:  68%  TID ratio:  0.94    IMPRESSION:  1. No evidence of stress-induced myocardial ischemia.   2.  Normal left 03-Jul-2019 16:56

## 2021-12-08 ENCOUNTER — CARE COORDINATION (OUTPATIENT)
Dept: CARE COORDINATION | Age: 61
End: 2021-12-08

## 2021-12-08 NOTE — CARE COORDINATION
ACM called patient. Left message requesting a return call for [facility] out reach. Contact information supplied.

## 2021-12-13 ENCOUNTER — OFFICE VISIT (OUTPATIENT)
Dept: ENDOCRINOLOGY | Age: 61
End: 2021-12-13
Payer: MEDICARE

## 2021-12-13 ENCOUNTER — OFFICE VISIT (OUTPATIENT)
Dept: CARDIOLOGY CLINIC | Age: 61
End: 2021-12-13
Payer: MEDICARE

## 2021-12-13 VITALS
SYSTOLIC BLOOD PRESSURE: 153 MMHG | HEART RATE: 80 BPM | HEIGHT: 67 IN | BODY MASS INDEX: 35.31 KG/M2 | RESPIRATION RATE: 14 BRPM | WEIGHT: 225 LBS | OXYGEN SATURATION: 96 % | DIASTOLIC BLOOD PRESSURE: 65 MMHG

## 2021-12-13 VITALS
DIASTOLIC BLOOD PRESSURE: 72 MMHG | SYSTOLIC BLOOD PRESSURE: 126 MMHG | HEART RATE: 71 BPM | BODY MASS INDEX: 35.72 KG/M2 | RESPIRATION RATE: 17 BRPM | HEIGHT: 67 IN | WEIGHT: 227.6 LBS | OXYGEN SATURATION: 96 %

## 2021-12-13 DIAGNOSIS — I10 ESSENTIAL HYPERTENSION: ICD-10-CM

## 2021-12-13 DIAGNOSIS — E11.65 UNCONTROLLED TYPE 2 DIABETES MELLITUS WITH HYPERGLYCEMIA (HCC): Primary | ICD-10-CM

## 2021-12-13 DIAGNOSIS — I25.119 CORONARY ARTERY DISEASE INVOLVING NATIVE CORONARY ARTERY OF NATIVE HEART WITH ANGINA PECTORIS (HCC): Primary | ICD-10-CM

## 2021-12-13 LAB
CHP ED QC CHECK: ABNORMAL
GLUCOSE BLD-MCNC: 296 MG/DL
HBA1C MFR BLD: 6.8 %

## 2021-12-13 PROCEDURE — G8417 CALC BMI ABV UP PARAM F/U: HCPCS | Performed by: INTERNAL MEDICINE

## 2021-12-13 PROCEDURE — G8427 DOCREV CUR MEDS BY ELIG CLIN: HCPCS | Performed by: INTERNAL MEDICINE

## 2021-12-13 PROCEDURE — 2022F DILAT RTA XM EVC RTNOPTHY: CPT | Performed by: INTERNAL MEDICINE

## 2021-12-13 PROCEDURE — 3017F COLORECTAL CA SCREEN DOC REV: CPT | Performed by: INTERNAL MEDICINE

## 2021-12-13 PROCEDURE — 82962 GLUCOSE BLOOD TEST: CPT | Performed by: INTERNAL MEDICINE

## 2021-12-13 PROCEDURE — 83036 HEMOGLOBIN GLYCOSYLATED A1C: CPT | Performed by: INTERNAL MEDICINE

## 2021-12-13 PROCEDURE — 99213 OFFICE O/P EST LOW 20 MIN: CPT | Performed by: INTERNAL MEDICINE

## 2021-12-13 PROCEDURE — 1036F TOBACCO NON-USER: CPT | Performed by: INTERNAL MEDICINE

## 2021-12-13 PROCEDURE — 99214 OFFICE O/P EST MOD 30 MIN: CPT | Performed by: INTERNAL MEDICINE

## 2021-12-13 PROCEDURE — G8482 FLU IMMUNIZE ORDER/ADMIN: HCPCS | Performed by: INTERNAL MEDICINE

## 2021-12-13 RX ORDER — NYSTATIN 100000 [USP'U]/G
POWDER TOPICAL
COMMUNITY
Start: 2021-11-12 | End: 2022-05-07

## 2021-12-13 RX ORDER — LANCETS 33 GAUGE
EACH MISCELLANEOUS
Qty: 300 EACH | Refills: 3 | Status: SHIPPED | OUTPATIENT
Start: 2021-12-13

## 2021-12-13 RX ORDER — BLOOD SUGAR DIAGNOSTIC
STRIP MISCELLANEOUS
Qty: 300 EACH | Refills: 3 | Status: SHIPPED | OUTPATIENT
Start: 2021-12-13

## 2021-12-13 RX ORDER — INSULIN ASPART 100 [IU]/ML
INJECTION, SOLUTION INTRAVENOUS; SUBCUTANEOUS
Qty: 10 PEN | Refills: 3 | Status: SHIPPED | OUTPATIENT
Start: 2021-12-13 | End: 2022-03-22 | Stop reason: SDUPTHER

## 2021-12-13 RX ORDER — ISOSORBIDE MONONITRATE 30 MG/1
TABLET, EXTENDED RELEASE ORAL
COMMUNITY
Start: 2021-11-14 | End: 2022-03-18

## 2021-12-13 RX ORDER — INSULIN GLARGINE 100 [IU]/ML
INJECTION, SOLUTION SUBCUTANEOUS
Qty: 30 PEN | Refills: 3 | Status: ON HOLD | OUTPATIENT
Start: 2021-12-13 | End: 2022-07-16 | Stop reason: HOSPADM

## 2021-12-13 RX ORDER — BLOOD-GLUCOSE METER
EACH MISCELLANEOUS
Qty: 1 KIT | Refills: 0 | Status: SHIPPED | OUTPATIENT
Start: 2021-12-13

## 2021-12-13 RX ORDER — UBIDECARENONE 75 MG
CAPSULE ORAL
COMMUNITY
Start: 2021-11-24 | End: 2022-01-25

## 2021-12-13 RX ORDER — HYDROXYZINE HYDROCHLORIDE 25 MG/1
TABLET, FILM COATED ORAL
Status: ON HOLD | COMMUNITY
Start: 2021-11-13 | End: 2022-07-16 | Stop reason: HOSPADM

## 2021-12-13 ASSESSMENT — ENCOUNTER SYMPTOMS
CHEST TIGHTNESS: 0
COUGH: 0
GASTROINTESTINAL NEGATIVE: 1
WHEEZING: 0
NAUSEA: 0
EYES NEGATIVE: 1
STRIDOR: 0
BLOOD IN STOOL: 0
RESPIRATORY NEGATIVE: 1
SHORTNESS OF BREATH: 0

## 2021-12-13 NOTE — PROGRESS NOTES
2021    Assessment:       Diagnosis Orders   1.  Uncontrolled type 2 diabetes mellitus with hyperglycemia (HCC)  POCT Glucose    POCT glycosylated hemoglobin (Hb A1C)         PLAN:     Orders Placed This Encounter   Procedures    Hemoglobin A1C     Standing Status:   Future     Standing Expiration Date:   2022    Basic Metabolic Panel     Standing Status:   Future     Standing Expiration Date:   2022    Lipid Panel     Standing Status:   Future     Standing Expiration Date:   2022     Order Specific Question:   Is Patient Fasting?/# of Hours     Answer:   10-12 hours    POCT Glucose    POCT glycosylated hemoglobin (Hb A1C)    HM DIABETES FOOT EXAM     Increase insulin dose  A1c goal of 7 or lower  Orders Placed This Encounter   Medications    insulin glargine (LANTUS SOLOSTAR) 100 UNIT/ML injection pen     Si UNITS AT BEDTIME     Dispense:  30 pen     Refill:  3    insulin aspart (NOVOLOG FLEXPEN) 100 UNIT/ML injection pen     Sig: 15 UNITS PLUS SLIDING SCALE   LESS THAN 180 NONE  181-250 2 UNITS  251-300 4 UNITS  301-400 6 UNITS   401-500 10 UNITS     Dispense:  10 pen     Refill:  3    blood glucose test strips (ONETOUCH VERIO) strip     Sig: QID     Dispense:  300 each     Refill:  3    OneTouch Delica Lancets 91U MISC     Sig: QID     Dispense:  300 each     Refill:  3    Blood Glucose Monitoring Suppl (ONETOUCH VERIO) w/Device KIT     Sig: AS DIRECTED     Dispense:  1 kit     Refill:  0           Orders Placed This Encounter   Procedures    POCT Glucose    POCT glycosylated hemoglobin (Hb A1C)       Subjective:     Chief Complaint   Patient presents with    Diabetes    3 Month Follow-Up     Vitals:    21 1353 21 1358   BP: (!) 152/71 (!) 153/65   Pulse: 80    Resp: 14    SpO2: 96%    Weight: 225 lb (102.1 kg)    Height: 5' 7\" (1.702 m)      Wt Readings from Last 3 Encounters:   21 225 lb (102.1 kg)   10/27/21 225 lb (102.1 kg)   10/22/21 222 lb (100.7 kg)     BP Readings from Last 3 Encounters:   12/13/21 (!) 153/65   10/27/21 (!) 98/56   10/22/21 (!) 90/58     Follow-up on type 2 diabetes with multiple complications include end-stage renal disease on dialysis coronary artery disease blood sugars are higher fasting as well as postprandial compliance variable  Hemoglobin A1c was 6.8    Diabetes  She presents for her follow-up diabetic visit. She has type 2 diabetes mellitus. Her disease course has been fluctuating. Associated symptoms include fatigue. Current diabetic treatment includes insulin injections. She is currently taking insulin pre-breakfast, pre-lunch, pre-dinner and at bedtime. Her overall blood glucose range is 180-200 mg/dl.  (Lab Results       Component                Value               Date                       LABA1C                   6.8                 12/13/2021            )     Past Medical History:   Diagnosis Date    Angina at rest [facility]) 5/24/2020    Anxiety     CAD S/P percutaneous coronary angioplasty 2015, 2018    stents per dr Andres Gonzalez    CHF (congestive heart failure) ([address]     CKD (chronic kidney disease) stage 4, GFR 15-29 ml/min ([address] 2/24/2018    CKD stage 4 due to type 2 diabetes mellitus ([address]     Contusion of right chest wall 2/16/2021    COPD (chronic obstructive pulmonary disease) ([address]     Diabetic nephropathy with proteinuria ([address] 2014    DJD (degenerative joint disease) of knee     Dr Julio Vora GERD (gastroesophageal reflux disease)     Hemiparesis, left ([address] 2013    [facility]    Hemodialysis patient [facility])     Hemodialysis-associated hypotension 10/22/2021    History of heart failure     History of seizures     History of type C viral hepatitis     HTN (hypertension)     Hyperlipidemia     Impaired mobility and activities of daily living     Mediastinal lymphadenopathy 2013    Sage Ernst    Metabolic syndrome     Moderate persistent asthma without complication     Need for extended care facility 2021    Neurogenic urinary incontinence 2013    Neuropathy in diabetes [facility])     Nonrheumatic mitral valve regurgitation 2021    Nonrheumatic tricuspid valve regurgitation 2021    Obesity (BMI 30-39. 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 Decker Mass      x1 Dr. Lea Jay, Dr Anneliese Del Toro  08/10/2021    [facility]    DIAGNOSTIC CARDIAC CATH LAB PROCEDURE  10/02/2019    HYSTERECTOMY, TOTAL ABDOMINAL      one ovary intact, Dr Mitul Gallagher, menorrhagia    IA TOTAL KNEE ARTHROPLASTY Left 2018    LEFT KNEE TOTAL KNEE ARTHROPLASTY, SHAYNA, NERVE BLOCK performed by Kayden Edward MD at [address] [place] [address] [facility]      TOE AMPUTATION Right     TOTAL KNEE ARTHROPLASTY  16    Dr Naseem Strickland TUNNELED [address] Right 2020    tunneled HD catheter per Dr Do White Marital status:      Spouse name: Not on file    Number of children: 2    Years of education: Not on file    Highest education level: Not on file   Occupational History    Occupation: disabled   Tobacco Use    Smoking status: Never Smoker    Smokeless tobacco: Never Used   Vaping Use    Vaping Use: Never used   Substance and Sexual Activity    Alcohol use: No     Alcohol/week: 0.0 standard drinks    Drug use: No    Sexual activity: Not Currently   Other Topics Concern    Not on file   Social History Narrative    Born in [place], one of 5    Twin sister Reyes, very ill in 2018, [place] [address]    Moved to [facility], , 2 children, one son and one daughter    Worked at [employer], as a nurse's aide    Disabled due to mental illness    Lived at [facility], was discharged, returned to independent living in 2017 in the daughter's house and has adjusted well    One son and one daughter, live in the same house with patient, Shu Victor pays the rent    [employer] reading ([employer])        10/11/2021 [facility] updates; patient lives with her daughter son-in-law and 2 grandchildren and patient's handicapped son. Per daughter, her brother is blind, MRDD, multiple health issues. Daughter's  is patient's legal guardian. Patient has hemodialysis Tuesday Thursday and Saturday. Patient's bedroom is on main floor with a half bath. Daughter walks patient upstairs once weekly for full bath. Patient is using her walker in the home. Patient has a hospital bed in the home. Social Determinants of Health     Financial Resource Strain: Low Risk     Difficulty of Paying Living Expenses: Not hard at all   Food Insecurity: No Food Insecurity    Worried About Running Out of Food in the Last Year: Never true    Yung of Food in the Last Year: Never true   Transportation Needs: No Transportation Needs    Lack of Transportation (Medical): No    Lack of Transportation (Non-Medical):  No   Physical Activity:     Days of Exercise per Week: Not on file    Minutes of Exercise per Session: Not on file   Stress:     Feeling of Stress : Not on file   Social Connections:     Frequency of Communication with Friends and Family: Not on file    Frequency of Social Gatherings with Friends and Family: Not on file    Attends [employer] Services: Not on file    Active Member of Clubs or Organizations: Not on file    Attends Club or Organization Meetings: Not on file    Marital Status: Not on file   Intimate Partner Violence:     Fear of Current or Ex-Partner: Not on file    Emotionally Abused: Not on file    Physically Abused: Not on file    Sexually Abused: Not on file   Housing Stability:     Unable to Pay for Housing in the Last Year: Not on file    Number of Places Lived in the Last Year: Not on file    Unstable Housing in the Last Year: Not on file     Family History   Problem Relation Age of Onset    Cancer Mother 76        survived   [facility] Breast [facility] Mother     Hypertension Father     Diabetes Sister     Mental Illness Sister      Allergies   Allergen Reactions    Codeine Hives     hives    Oxycontin [Oxycodone Hcl] Hives       Current Outpatient Medications:     vitamin B-12 (CYANOCOBALAMIN) 100 MCG tablet, , Disp: , Rfl:     hydrOXYzine (ATARAX) 25 MG tablet, TAKE ONE TABLET BY MOUTH TWO TIMES A DAY, Disp: , Rfl:     isosorbide mononitrate (IMDUR) 30 MG extended release tablet, TAKE FOUR (4) TABLETS BY MOUTH EVERY DAY, Disp: , Rfl:     NYAMYC 570568 UNIT/GM powder, APPLY TO AFFECTED AREA OF ABDOMINAL FOLDS EVERY 12 HOURS AS NEEDED, Disp: , Rfl:     sertraline (ZOLOFT) 50 MG tablet, TAKE 1 TABLET BY MOUTH DAILY, Disp: , Rfl:     midodrine (PROAMATINE) 10 MG tablet, Take 1 tablet by mouth every morning, Disp: 30 tablet, Rfl: 5    pantoprazole (PROTONIX) 20 MG tablet, TAKE 1 TABLET BY MOUTH DAILY, Disp: 30 tablet, Rfl: 11    BRILINTA 90 MG TABS tablet, TAKE ONE(1) TABLET TWICE DAILY, Disp: 60 tablet, Rfl: 11    ARIPiprazole (ABILIFY) 5 MG tablet, TAKE 1 TABLET BY MOUTH ONCE DAILY, Disp: 30 tablet, Rfl: 5    Insulin Pen Needle (SURE COMFORT PEN NEEDLES) 30G X 8 MM MISC, USE AS DIRECTED FIVE TIMES A DAILY, Disp: 100 each, Rfl: 10    ranolazine (RANEXA) 500 MG extended release tablet, TAKE 1 TABLET BY MOUTH TWICE DAILY, Disp: 60 tablet, Rfl: 11    JANUVIA 50 MG tablet, TAKE 1 TABLET BY MOUTH EVERY DAY, Disp: , Rfl:     b complex-C-folic acid (NEPHROCAPS) 1 MG capsule, Take 1 capsule by mouth daily, Disp: , Rfl:     LANTUS SOLOSTAR 100 UNIT/ML injection pen, 55 units at bedtime, Disp: 10 pen, Rfl: 10    zoster recombinant adjuvanted vaccine (SHINGRIX) 50 MCG/0.5ML SUSR injection, Inject 0.5 mLs into the muscle See Admin Instructions 1 dose now and repeat in 2-6 months, Disp: 0.5 mL, Rfl: 0    metoprolol tartrate (LOPRESSOR) 25 MG tablet, TAKE 1/2 TABLET BY MOUTH TWO TIMES DAILY , Disp: 90 tablet, Rfl: 4    midodrine (PROAMATINE) 5 MG tablet, Take 5 mg by mouth three times a week Give prior to dialysis. , Disp: , Rfl:     insulin aspart (NOVOLOG FLEXPEN) 100 UNIT/ML injection pen, INJECT 8-10  units with each meals (Patient taking differently: Inject 0-8 Units into the skin 3 times daily (before meals) And per sliding scale fo 0-150=0 units; 151-200= 2units; 201-250= 4 units; 251-300=6units; 301-350=8 units; 351-400=1Call MD/ CNP), Disp: 10 pen, Rfl: 3    melatonin 10 MG CAPS capsule, Take 1 capsule by mouth nightly, Disp: 30 capsule, Rfl: 12    aspirin EC 81 MG EC tablet, Take 1 tablet by mouth daily, Disp: 30 tablet, Rfl: 12    magnesium oxide (MAG-OX) 400 MG tablet, Take 1 tablet by mouth daily, Disp: 90 tablet, Rfl: 4    atorvastatin (LIPITOR) 40 MG tablet, Take 1 tablet by mouth daily, Disp: 90 tablet, Rfl: 4    blood glucose test strips (FREESTYLE LITE) strip, 1 each by Does not apply route 4 times daily (before meals and nightly) As needed. , Disp: 200 strip, Rfl: 5    Alcohol Swabs (EASY TOUCH ALCOHOL PREP MEDIUM) 70 % PADS, USE AS DIRECTED THREE TIMES A DAY, Disp: 100 each, Rfl: 3    FreeStyle Lancets MISC, Test 4x daily, Disp: 150 each, Rfl: 3    docusate sodium (COLACE, DULCOLAX) 100 MG CAPS, Take 100 mg by mouth 2 times daily For the prevention and treatment of constipation while taking pain medications. , Disp: 30 capsule, Rfl: 0    acetaminophen (TYLENOL) 325 MG tablet, Take 2 tablets by mouth every 4 hours as needed for Pain (For mild to moderate pain (Pain 1-6 out of 10 on pain scale)), Disp: 120 tablet, Rfl: 0    Blood Glucose Monitoring Suppl (FREESTYLE LITE) CORETTA, 1 Device by Does not apply route daily as needed (Diabetes) Use freestyle meter to test blood sugar as needed, Disp: 1 Device, Rfl: 0    nitroGLYCERIN (NITROSTAT) 0.4 MG SL tablet, Place 1 tablet under the tongue every 5 minutes as needed for Chest pain, Disp: , Rfl:     pregabalin (LYRICA) 75 MG capsule, Take 1 capsule by mouth 2 times daily for 30 days. Take 75 mg by mouth 2 times daily. , Disp: 60 capsule, Rfl: 5  Lab Results   Component Value Date     (L) 10/11/2021    K 4.1 10/11/2021    CL 86 (L) 10/11/2021    CO2 23 10/11/2021    BUN 52 (H) 10/11/2021    CREATININE 5.66 (H) 10/11/2021    GLUCOSE 296 12/13/2021    CALCIUM 9.3 10/11/2021    PROT 7.0 10/11/2021    LABALBU 3.9 10/11/2021    BILITOT 0.5 10/11/2021    ALKPHOS 111 10/11/2021    AST 20 10/11/2021    ALT 16 10/11/2021    LABGLOM 7.6 (L) 10/11/2021    GFRAA 9.1 (L) 10/11/2021    GLOB 3.1 10/11/2021     Lab Results   Component Value Date    WBC 9.2 10/11/2021    HGB 9.7 (L) 10/11/2021    HCT 27.6 (L) 10/11/2021    MCV 92.6 10/11/2021     10/11/2021     Lab Results   Component Value Date    LABA1C 6.8 12/13/2021    LABA1C 6.2 (H) 08/18/2021    LABA1C 6.6 (H) 07/16/2021     Lab Results   Component Value Date    HDL 48 06/11/2020    HDL 44 05/25/2020    HDL 55 02/26/2019    LDLCALC 37 06/11/2020    LDLCALC 46 05/25/2020    LDLCALC 59 02/26/2019    CHOL 101 06/11/2020    CHOL 107 05/25/2020    CHOL 137 02/26/2019    TRIG 82 06/11/2020    TRIG 85 05/25/2020    TRIG 116 02/26/2019     No results found for: TESTM  Lab Results   Component Value Date    TSH 0.856 07/16/2021    TSH 0.454 06/28/2020    TSH 0.574 10/08/2017    TSHREFLEX 1.300 08/27/2019     No results found for: TPOABS    Review of Systems   Constitutional: Positive for fatigue. Cardiovascular: Negative. Endocrine: Negative. Objective:   Physical Exam  Vitals reviewed. Constitutional:       Appearance: Normal appearance. She is obese. HENT:      Head: Normocephalic and atraumatic. Hair is normal.      Right Ear: External ear normal.      Left Ear: External ear normal.      Nose: Nose normal.   Eyes:      General: No scleral icterus. Right eye: No discharge. Left eye: No discharge. Extraocular Movements: Extraocular movements intact. Conjunctiva/sclera: Conjunctivae normal.   Neck:      Trachea: Trachea normal.   Cardiovascular:      Rate and Rhythm: Normal rate. Pulmonary:      Effort: Pulmonary effort is normal.   Musculoskeletal:         General: Normal range of motion. Cervical back: Normal range of motion and neck supple. Feet:    Neurological:      General: No focal deficit present. Mental Status: She is alert. Psychiatric:         Mood and Affect: Mood is depressed.          Behavior: Behavior normal.

## 2021-12-13 NOTE — PROGRESS NOTES
Subsequent Progress Note  Patient: Eulalio Oconnell  YOB: 1960  MRN: [id number]    Chief Complaint: fall CAD HTN HPL  Chief Complaint   Patient presents with    3 Month Follow-Up    Coronary Artery Disease    Hypertension    Shortness of Breath    Other     pt would like to go over stress tests 12/02     Dr. Marline Roger pt   CV Data:  6/2020 Echo EF 60  Mild mR  RVSP 46   7/2020 LAD DEWEY. She has prior stents as well.   8/21 Echo EF 60  8/2021 LAD recurrent ISR with double layer stents. Went to [facility] and had redo stent. 12/21 CUS B/l ICA 50-69  12/21 Spec tnegative    HD T Th Sat  Subjective/HPI: TELEHEALTH EVALUATION -- Audio/Visual (During DXVAJ-80 public health emergency)    Pt is at a nursing home now. No cp occ SOB no falls no bleed. Takes meds. Recently Plavix stopped and Brilinta added. Had recerrnet admissions for CP    10/28/2020 started HD( T Thur and Sat). Past 3 days gaine ~ 10 LBS according to her scale at home and HD. She is not short of breath and denies CP. She is here due to discrepancy in weight. 2/10/21 TELEHEALTH EVALUATION -- Audio/Visual (During RKWIY-09 public health emergency)    At [facility]. No further falls no cp some sob eats well takes meds. Will go home net week. Doing well w HD    9/10/21 after LAD stent at [facility] feels better no cp no sob no falls no bleed. 10/27/21 recent er for CP and fall. BP was low. Some parikh. No bleed. Takes meds.  Often dizzy    EKG: SR 65 RBBB    Past Medical History:   Diagnosis Date    Angina at rest [facility]) 5/24/2020    Anxiety     CAD S/P percutaneous coronary angioplasty 2015, 2018    stents per dr Vee Sauceda    CHF (congestive heart failure) [facility]     CKD (chronic kidney disease) stage 4, GFR 15-29 ml/min ([facility] 2/24/2018    CKD stage 4 due to type 2 diabetes mellitus ([facility]     Contusion of right chest wall 2/16/2021    COPD (chronic obstructive pulmonary disease) ([facility]     Diabetic nephropathy with proteinuria ([address]     DJD (degenerative joint disease) of knee     Dr Mansi Thorne GERD (gastroesophageal reflux disease)     Hemiparesis, left ([address] 2013    [facility] Assisted Living ([facility]    Hemodialysis patient [facility])     Hemodialysis-associated hypotension 10/22/2021    History of heart failure     History of seizures     History of type C viral hepatitis     HTN (hypertension)     Hyperlipidemia     Impaired mobility and activities of daily living     Mediastinal lymphadenopathy     Dr Gregory Hill, Valentino Bautista    Metabolic syndrome     Moderate persistent asthma without complication 5059    Need for extended care facility 2021    Neurogenic urinary incontinence 2013    Neuropathy in diabetes [facility])     Nonrheumatic mitral valve regurgitation 2021    Nonrheumatic tricuspid valve regurgitation 2021    Obesity (BMI 30-39. 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 Danielle Brand      x1 Dr. Lori Clements, Dr Anna Parker 2018   [address]  08/10/2021    [facility]    DIAGNOSTIC CARDIAC CATH LAB PROCEDURE  10/02/2019    HYSTERECTOMY, TOTAL ABDOMINAL      one ovary intact, Dr Jenna Pappas, menorrhagia    [address] ARTHROPLASTY Left 6/21/2018    LEFT KNEE TOTAL KNEE ARTHROPLASTY, SHAYNA, NERVE BLOCK performed by Rhys Laura MD at [facility]      TOE AMPUTATION Right     TOTAL KNEE ARTHROPLASTY  05/19/16    Dr Yudith Love TUNNELED [address] Right 07/01/2020    tunneled HD catheter per Dr Adame Home       Family History   Problem Relation Age of Onset    Cancer Mother 76        survived   Julianne Lopez Breast Cancer Mother     Hypertension Father     Diabetes Sister     Mental Illness Sister        Social History     Socioeconomic History    Marital status:      Spouse name: None    Number of children: 2    Years of education: None    Highest education level: None   Occupational History    Occupation: disabled   Tobacco Use    Smoking status: Never Smoker    Smokeless tobacco: Never Used   Vaping Use    Vaping Use: Never used   Substance and Sexual Activity    Alcohol use: No     Alcohol/week: 0.0 standard drinks    Drug use: No    Sexual activity: Not Currently   Other Topics Concern    None   Social History Narrative    Born in [place], one of 5    Twin sister Reyes, very ill in 2018, [address] 2019    Moved to [facility], , 2 children, one son and one daughter    Worked at [employer], as a nurse's aide    Disabled due to mental illness    Lived at [facility], was discharged, returned to independent living in 2017 in the daughter's house and has adjusted well    One son and one daughter, live in the same house with patient, Julian Hernández pays the rent    [employer] reading ([place])        10/11/2021 [facility] updates; patient lives with her daughter son-in-law and 2 grandchildren and patient's handicapped son. Per daughter, her brother is blind, MRDD, multiple health issues. Daughter's  is patient's legal guardian. Patient has hemodialysis Tuesday Thursday and Saturday. Patient's bedroom is on main floor with a half bath.   Daughter walks patient upstairs once weekly for full bath. Patient is using her walker in the home. Patient has a hospital bed in the home. Social Determinants of Health     Financial Resource Strain: Low Risk     Difficulty of Paying Living Expenses: Not hard at all   Food Insecurity: No Food Insecurity    Worried About Running Out of Food in the Last Year: Never true    Yung of Food in the Last Year: Never true   Transportation Needs: No Transportation Needs    Lack of Transportation (Medical): No    Lack of Transportation (Non-Medical):  No   Physical Activity:     Days of Exercise per Week: Not on file    Minutes of Exercise per Session: Not on file   Stress:     Feeling of Stress : Not on file   Social Connections:     Frequency of Communication with Friends and Family: Not on file    Frequency of Social Gatherings with Friends and Family: Not on file    Attends Shinto Services: Not on file    Active Member of Clubs or Organizations: Not on file    Attends Club or Organization Meetings: Not on file    Marital Status: Not on file   Intimate Partner Violence:     Fear of Current or Ex-Partner: Not on file    Emotionally Abused: Not on file    Physically Abused: Not on file    Sexually Abused: Not on file   Housing Stability:     Unable to Pay for Housing in the Last Year: Not on file    Number of Places Lived in the Last Year: Not on file    Unstable Housing in the Last Year: Not on file       Allergies   Allergen Reactions    Codeine Hives     hives    Oxycontin [Oxycodone Hcl] Hives       Current Outpatient Medications   Medication Sig Dispense Refill    vitamin B-12 (CYANOCOBALAMIN) 100 MCG tablet       hydrOXYzine (ATARAX) 25 MG tablet TAKE ONE TABLET BY MOUTH TWO TIMES A DAY      isosorbide mononitrate (IMDUR) 30 MG extended release tablet TAKE FOUR (4) TABLETS BY MOUTH EVERY DAY      NYAMYC 063946 UNIT/GM powder APPLY TO AFFECTED AREA OF ABDOMINAL FOLDS EVERY 12 HOURS AS NEEDED      sertraline (ZOLOFT) 50 MG tablet TAKE 1 TABLET BY MOUTH DAILY      insulin glargine (LANTUS SOLOSTAR) 100 UNIT/ML injection pen 70 UNITS AT BEDTIME 30 pen 3    insulin aspart (NOVOLOG FLEXPEN) 100 UNIT/ML injection pen 15 UNITS PLUS SLIDING SCALE   LESS THAN 180 NONE  181-250 2 UNITS  251-300 4 UNITS  301-400 6 UNITS   401-500 10 UNITS 10 pen 3    blood glucose test strips (ONETOUCH VERIO) strip  each 3    OneTouch Delica Lancets 46V MISC  each 3    Blood Glucose Monitoring Suppl (ONETOUCH VERIO) w/Device KIT AS DIRECTED 1 kit 0    midodrine (PROAMATINE) 10 MG tablet Take 1 tablet by mouth every morning 30 tablet 5    pantoprazole (PROTONIX) 20 MG tablet TAKE 1 TABLET BY MOUTH DAILY 30 tablet 11    BRILINTA 90 MG TABS tablet TAKE ONE(1) TABLET TWICE DAILY 60 tablet 11    ARIPiprazole (ABILIFY) 5 MG tablet TAKE 1 TABLET BY MOUTH ONCE DAILY 30 tablet 5    Insulin Pen Needle (SURE COMFORT PEN NEEDLES) 30G X 8 MM MISC USE AS DIRECTED FIVE TIMES A DAILY 100 each 10    ranolazine (RANEXA) 500 MG extended release tablet TAKE 1 TABLET BY MOUTH TWICE DAILY 60 tablet 11    b complex-C-folic acid (NEPHROCAPS) 1 MG capsule Take 1 capsule by mouth daily      LANTUS SOLOSTAR 100 UNIT/ML injection pen 55 units at bedtime 10 pen 10    zoster recombinant adjuvanted vaccine (SHINGRIX) 50 MCG/0.5ML SUSR injection Inject 0.5 mLs into the muscle See Admin Instructions 1 dose now and repeat in 2-6 months 0.5 mL 0    metoprolol tartrate (LOPRESSOR) 25 MG tablet TAKE 1/2 TABLET BY MOUTH TWO TIMES DAILY  90 tablet 4    midodrine (PROAMATINE) 5 MG tablet Take 5 mg by mouth three times a week Give prior to dialysis.        insulin aspart (NOVOLOG FLEXPEN) 100 UNIT/ML injection pen INJECT 8-10  units with each meals (Patient taking differently: Inject 0-8 Units into the skin 3 times daily (before meals) And per sliding scale fo 0-150=0 units; 151-200= 2units; 201-250= 4 units; 251-300=6units; 301-350=8 units; 351-400=1Call MD/  CNP) 10 pen 3    melatonin 10 MG CAPS capsule Take 1 capsule by mouth nightly 30 capsule 12    aspirin EC 81 MG EC tablet Take 1 tablet by mouth daily 30 tablet 12    magnesium oxide (MAG-OX) 400 MG tablet Take 1 tablet by mouth daily 90 tablet 4    atorvastatin (LIPITOR) 40 MG tablet Take 1 tablet by mouth daily 90 tablet 4    blood glucose test strips (FREESTYLE LITE) strip 1 each by Does not apply route 4 times daily (before meals and nightly) As needed. 200 strip 5    Alcohol Swabs (EASY TOUCH ALCOHOL PREP MEDIUM) 70 % PADS USE AS DIRECTED THREE TIMES A  each 3    FreeStyle Lancets MISC Test 4x daily 150 each 3    docusate sodium (COLACE, DULCOLAX) 100 MG CAPS Take 100 mg by mouth 2 times daily For the prevention and treatment of constipation while taking pain medications. 30 capsule 0    acetaminophen (TYLENOL) 325 MG tablet Take 2 tablets by mouth every 4 hours as needed for Pain (For mild to moderate pain (Pain 1-6 out of 10 on pain scale)) 120 tablet 0    Blood Glucose Monitoring Suppl (FREESTYLE LITE) CORETTA 1 Device by Does not apply route daily as needed (Diabetes) Use freestyle meter to test blood sugar as needed 1 Device 0    nitroGLYCERIN (NITROSTAT) 0.4 MG SL tablet Place 1 tablet under the tongue every 5 minutes as needed for Chest pain      pregabalin (LYRICA) 75 MG capsule Take 1 capsule by mouth 2 times daily for 30 days. Take 75 mg by mouth 2 times daily. 60 capsule 5     No current facility-administered medications for this visit. Review of Systems:   Review of Systems   Constitutional: Negative. Negative for diaphoresis and fatigue. HENT: Negative. Eyes: Negative. Respiratory: Negative. Negative for cough, chest tightness, shortness of breath, wheezing and stridor. Cardiovascular: Negative. Negative for chest pain, palpitations and leg swelling. Gastrointestinal: Negative. Negative for blood in stool and nausea. Genitourinary: Negative.     Musculoskeletal: Negative. Skin: Negative. Neurological: Negative. Negative for dizziness, syncope, weakness and light-headedness. Hematological: Negative. Psychiatric/Behavioral: Negative. Physical Examination:    /72 (Site: Left Upper Arm, Position: Sitting, Cuff Size: Small Adult)   Pulse 71   Resp 17   Ht 5' 7\" (1.702 m)   Wt 227 lb 9.6 oz (103.2 kg)   LMP  (LMP Unknown)   SpO2 96%   BMI 35.65 kg/m²    Physical Exam   Constitutional: She appears healthy. No distress. HENT:   Normal cephalic and Atraumatic   Eyes: Pupils are equal, round, and reactive to light. Neck: Thyroid normal. No JVD present. No neck adenopathy. No thyromegaly present. Cardiovascular: Normal rate, regular rhythm, intact distal pulses and normal pulses. Murmur heard. Pulmonary/Chest: Effort normal and breath sounds normal. She has no wheezes. She has no rales. She exhibits no tenderness. Abdominal: Soft. Bowel sounds are normal. There is no abdominal tenderness. Musculoskeletal:         General: No tenderness or edema. Normal range of motion. Cervical back: Normal range of motion and neck supple. Neurological: She is alert and oriented to person, place, and time. Skin: Skin is warm. No cyanosis. Nails show no clubbing.        LABS:  CBC:   Lab Results   Component Value Date    WBC 9.2 10/11/2021    RBC 2.98 10/11/2021    HGB 9.7 10/11/2021    HCT 27.6 10/11/2021    MCV 92.6 10/11/2021    MCH 32.4 10/11/2021    MCHC 35.0 10/11/2021    RDW 16.0 10/11/2021     10/11/2021    MPV 10.0 03/05/2020     Lipids:  Lab Results   Component Value Date    CHOL 101 06/11/2020    CHOL 107 05/25/2020    CHOL 137 02/26/2019     Lab Results   Component Value Date    TRIG 82 06/11/2020    TRIG 85 05/25/2020    TRIG 116 02/26/2019     Lab Results   Component Value Date    HDL 48 06/11/2020    HDL 44 05/25/2020    HDL 55 02/26/2019     Lab Results   Component Value Date    LDLCALC 37 06/11/2020    LDLCALC 46 05/25/2020 [address] 59 02/26/2019     Lab Results   Component Value Date    LABVLDL 59.0 05/04/2013    VLDL 43 01/30/2017     Lab Results   Component Value Date    CHOLHDLRATIO 4.32 01/30/2017     CMP:    Lab Results   Component Value Date     10/11/2021    K 4.1 10/11/2021    K 3.9 08/01/2021    CL 86 10/11/2021    CO2 23 10/11/2021    BUN 52 10/11/2021    CREATININE 5.66 10/11/2021    GFRAA 9.1 10/11/2021    LABGLOM 7.6 10/11/2021    GLUCOSE 296 12/13/2021    GLUCOSE 419 07/30/2021    PROT 7.0 10/11/2021    LABALBU 3.9 10/11/2021    CALCIUM 9.3 10/11/2021    BILITOT 0.5 10/11/2021    ALKPHOS 111 10/11/2021    AST 20 10/11/2021    ALT 16 10/11/2021     BMP:    Lab Results   Component Value Date     10/11/2021    K 4.1 10/11/2021    K 3.9 08/01/2021    CL 86 10/11/2021    CO2 23 10/11/2021    BUN 52 10/11/2021    LABALBU 3.9 10/11/2021    CREATININE 5.66 10/11/2021    CALCIUM 9.3 10/11/2021    GFRAA 9.1 10/11/2021    LABGLOM 7.6 10/11/2021    GLUCOSE 296 12/13/2021    GLUCOSE 419 07/30/2021     Magnesium:    Lab Results   Component Value Date    MG 2.1 02/24/2021     TSH:  Lab Results   Component Value Date    TSH 0.856 07/16/2021       Patient Active Problem List   Diagnosis    Atherosclerotic heart disease of native coronary artery with unspecified angina pectoris (HCC)    Schizophrenia, paranoid, chronic    Metabolic syndrome    Vitamin B 12 deficiency    Cerebral microvascular disease    Mixed hyperlipidemia    Other hammer toe (acquired)    Vitamin D insufficiency    Incontinence of urine    Diabetic nephropathy with proteinuria ([facility]    Essential (primary) hypertension    History of type C viral hepatitis    Urinary incontinence due to cognitive impairment    History of seizures    Stented coronary artery-plan is to stay on Plavix indefinately per Dr Wilson Ordaz Other specified diabetes mellitus with diabetic neuropathy, unspecified ([facility]    Controlled type 2 diabetes mellitus with diabetic neuropathy, with long-term current use of insulin (HCC)    Hemiparesis, left (HCC)    Angina, class II ([facility])    Pain, unspecified    Tardive dyskinesia    Shortness of breath    Chronic diastolic congestive heart failure (HCC)    Sleep apnea, unspecified    Pulmonary hypertension, unspecified (HCC)    Class 2 severe obesity with serious comorbidity and body mass index (BMI) of 36.0 to 36.9 in adult [facility])    Edema    Closed supracondylar fracture of right humerus    Other chronic pain    Palliative care patient    Recurrent falls    Renal failure    Difficulty in walking    ESRD (end stage renal disease) on dialysis (HCC)    Weakness    Other seizures ([facility])    Moderate persistent asthma without complication    [id number]    Post PTCA    Falls frequently    Chest wall contusion, left, initial encounter    Headache, unspecified    Paranoid schizophrenia ([address]    Compression fracture of spine ([facility])    Closed rib fracture    Depression    Chronic obstructive pulmonary disease (HCC)    Critical illness polyneuropathy ([address]    Multiple closed fractures of ribs of right side    Nonrheumatic mitral valve regurgitation    Nonrheumatic tricuspid valve regurgitation    Need for extended care facility    Chronic pain of both shoulders    Hemodialysis-associated hypotension       There are no discontinued medications. Modified Medications    No medications on file       No orders of the defined types were placed in this encounter. Assessment/Plan:    1. Coronary artery disease involving native heart with angina pectoris, unspecified vessel or lesion type ([address]  CP/Angina- SPect negative    2. Diastolic congestive heart failure, unspecified HF chronicity ([facility])  Stable. No edema. - continue meds. Low salt diet    3. Stented coronary artery    4. Shortness of breath   stable - no worse    5. Mixed hyperlipidemia  Statin - long term. Check labs. Low fat diet.      6. Essential hypertension BP low- stop Imdur 120 for now. 7. Coronary artery disease involving native coronary artery of native heart with angina pectoris ([facility]    8 dizzy/falls- CUS. - moderate B/L- will continue surveillance. Counseling:  Heart Healthy Lifestyle, Low Salt Diet, Take Precautions to Prevent Falls and Walk Daily    Return in about 3 months (around 3/13/2022).       Electronically signed by Jose A Jimenez MD on 12/13/2021 at 2:46 PM

## 2021-12-14 ENCOUNTER — OFFICE VISIT (OUTPATIENT)
Dept: GASTROENTEROLOGY | Age: 61
End: 2021-12-14
Payer: MEDICARE

## 2021-12-14 VITALS — OXYGEN SATURATION: 100 % | BODY MASS INDEX: 36.1 KG/M2 | WEIGHT: 230 LBS | HEART RATE: 98 BPM | HEIGHT: 67 IN

## 2021-12-14 DIAGNOSIS — R19.7 DIARRHEA, UNSPECIFIED TYPE: Primary | ICD-10-CM

## 2021-12-14 PROCEDURE — 3017F COLORECTAL CA SCREEN DOC REV: CPT | Performed by: INTERNAL MEDICINE

## 2021-12-14 PROCEDURE — 1036F TOBACCO NON-USER: CPT | Performed by: INTERNAL MEDICINE

## 2021-12-14 PROCEDURE — 99204 OFFICE O/P NEW MOD 45 MIN: CPT | Performed by: INTERNAL MEDICINE

## 2021-12-14 PROCEDURE — G8427 DOCREV CUR MEDS BY ELIG CLIN: HCPCS | Performed by: INTERNAL MEDICINE

## 2021-12-14 PROCEDURE — G8417 CALC BMI ABV UP PARAM F/U: HCPCS | Performed by: INTERNAL MEDICINE

## 2021-12-14 PROCEDURE — G8482 FLU IMMUNIZE ORDER/ADMIN: HCPCS | Performed by: INTERNAL MEDICINE

## 2021-12-14 NOTE — PROGRESS NOTES
Gastroenterology Clinic Visit    Breanna Coleman  [id number]  Chief Complaint   Patient presents with    New Patient     HPI: 59 y.o. female presents to the clinic with 1 month history of diarrhea and fecal urgency. Symptoms mainly after eating. Denies any change in health, denies any new medications, denies any abdominal pain or blood in the stool. Weight has gone up. Patient reports having normal bowel movements prior to this. Does not recall having episodes like this in the past however review of her medical chart reveals patient was seen approximately 7 years ago with diarrhea. Underwent colonoscopy with random colon biopsies at [facility]. Patient denies any recent antibiotic use, sick contacts, change in medications or any new medications. Patient reports having tried Pepto-Bismol and Imodium without any improvement in symptoms  Patient with multiple comorbidities, multiple medications. Diabetes with last HbA1c in August to 6.2. Patient on insulin. Chronic kidney disease on hemodialysis, patient receives dialysis on Tuesday Thursday and Saturday. History of hepatitis C, reports being treated and cleared the virus    Previous GI work up/Endoscopic investigations:   Colonoscopy: 1/9/2014: Normal colonic mucosa, random colon biopsies obtained, pathology report not available for review. Review of Systems   All other systems reviewed and are negative.     Past Medical History:   Diagnosis Date    Angina at rest [facility]) 5/24/2020    Anxiety     CAD S/P percutaneous coronary angioplasty 2015, 2018    stents per dr Maribell Caceres    CHF (congestive heart failure) ([address]     CKD (chronic kidney disease) stage 4, GFR 15-29 ml/min ([address] 2/24/2018    CKD stage 4 due to type 2 diabetes mellitus ([address]     Contusion of right chest wall 2/16/2021    COPD (chronic obstructive pulmonary disease) ([address]     Diabetic nephropathy with proteinuria ([address] 2014    DJD (degenerative joint ARTHROPLASTY, SHAYNA, NERVE BLOCK performed by Bro Arreguin MD at [facility]      TOE AMPUTATION Right     TOTAL KNEE ARTHROPLASTY  05/19/16    Dr Don Servin TUNNELED [address] Right 07/01/2020    tunneled HD catheter per Dr Bc Zaidi Medications on File Prior to Visit   Medication Sig Dispense Refill    vitamin B-12 (CYANOCOBALAMIN) 100 MCG tablet       hydrOXYzine (ATARAX) 25 MG tablet TAKE ONE TABLET BY MOUTH TWO TIMES A DAY      isosorbide mononitrate (IMDUR) 30 MG extended release tablet TAKE FOUR (4) TABLETS BY MOUTH EVERY DAY      NYAMYC 181401 UNIT/GM powder APPLY TO AFFECTED AREA OF ABDOMINAL FOLDS EVERY 12 HOURS AS NEEDED      sertraline (ZOLOFT) 50 MG tablet TAKE 1 TABLET BY MOUTH DAILY      insulin glargine (LANTUS SOLOSTAR) 100 UNIT/ML injection pen 70 UNITS AT BEDTIME 30 pen 3    insulin aspart (NOVOLOG FLEXPEN) 100 UNIT/ML injection pen 15 UNITS PLUS SLIDING SCALE   LESS THAN 180 NONE  181-250 2 UNITS  251-300 4 UNITS  301-400 6 UNITS   401-500 10 UNITS 10 pen 3    blood glucose test strips (ONETOUCH VERIO) strip  each 3    OneTouch Delica Lancets 64P MISC  each 3    Blood Glucose Monitoring Suppl (ONETOUCH VERIO) w/Device KIT AS DIRECTED 1 kit 0    midodrine (PROAMATINE) 10 MG tablet Take 1 tablet by mouth every morning 30 tablet 5    pantoprazole (PROTONIX) 20 MG tablet TAKE 1 TABLET BY MOUTH DAILY 30 tablet 11    BRILINTA 90 MG TABS tablet TAKE ONE(1) TABLET TWICE DAILY 60 tablet 11    ARIPiprazole (ABILIFY) 5 MG tablet TAKE 1 TABLET BY MOUTH ONCE DAILY 30 tablet 5    Insulin Pen Needle (SURE COMFORT PEN NEEDLES) 30G X 8 MM MISC USE AS DIRECTED FIVE TIMES A DAILY 100 each 10    ranolazine (RANEXA) 500 MG extended release tablet TAKE 1 TABLET BY MOUTH TWICE DAILY 60 tablet 11    b complex-C-folic acid (NEPHROCAPS) 1 MG capsule Take 1 capsule by mouth daily      LANTUS SOLOSTAR 100 UNIT/ML injection pen 55 units at bedtime 10 pen 10    zoster recombinant adjuvanted vaccine (SHINGRIX) 50 MCG/0.5ML SUSR injection Inject 0.5 mLs into the muscle See Admin Instructions 1 dose now and repeat in 2-6 months 0.5 mL 0    metoprolol tartrate (LOPRESSOR) 25 MG tablet TAKE 1/2 TABLET BY MOUTH TWO TIMES DAILY  90 tablet 4    midodrine (PROAMATINE) 5 MG tablet Take 5 mg by mouth three times a week Give prior to dialysis.  insulin aspart (NOVOLOG FLEXPEN) 100 UNIT/ML injection pen INJECT 8-10  units with each meals (Patient taking differently: Inject 0-8 Units into the skin 3 times daily (before meals) And per sliding scale fo 0-150=0 units; 151-200= 2units; 201-250= 4 units; 251-300=6units; 301-350=8 units; 351-400=1Call MD/  CNP) 10 pen 3    melatonin 10 MG CAPS capsule Take 1 capsule by mouth nightly 30 capsule 12    aspirin EC 81 MG EC tablet Take 1 tablet by mouth daily 30 tablet 12    magnesium oxide (MAG-OX) 400 MG tablet Take 1 tablet by mouth daily 90 tablet 4    atorvastatin (LIPITOR) 40 MG tablet Take 1 tablet by mouth daily 90 tablet 4    blood glucose test strips (FREESTYLE LITE) strip 1 each by Does not apply route 4 times daily (before meals and nightly) As needed. 200 strip 5    Alcohol Swabs (EASY TOUCH ALCOHOL PREP MEDIUM) 70 % PADS USE AS DIRECTED THREE TIMES A  each 3    FreeStyle Lancets MISC Test 4x daily 150 each 3    acetaminophen (TYLENOL) 325 MG tablet Take 2 tablets by mouth every 4 hours as needed for Pain (For mild to moderate pain (Pain 1-6 out of 10 on pain scale)) 120 tablet 0    Blood Glucose Monitoring Suppl (FREESTYLE LITE) CORETTA 1 Device by Does not apply route daily as needed (Diabetes) Use freestyle meter to test blood sugar as needed 1 Device 0    nitroGLYCERIN (NITROSTAT) 0.4 MG SL tablet Place 1 tablet under the tongue every 5 minutes as needed for Chest pain      pregabalin (LYRICA) 75 MG capsule Take 1 capsule by mouth 2 times daily for 30 days.  Take 75 mg by mouth 2 times patient upstairs once weekly for full bath. Patient is using her walker in the home. Patient has a hospital bed in the home. Social Determinants of Health     Financial Resource Strain: Low Risk     Difficulty of Paying Living Expenses: Not hard at all   Food Insecurity: No Food Insecurity    Worried About Running Out of Food in the Last Year: Never true    Yung of Food in the Last Year: Never true   Transportation Needs: No Transportation Needs    Lack of Transportation (Medical): No    Lack of Transportation (Non-Medical): No   Physical Activity:     Days of Exercise per Week: Not on file    Minutes of Exercise per Session: Not on file   Stress:     Feeling of Stress : Not on file   Social Connections:     Frequency of Communication with Friends and Family: Not on file    Frequency of Social Gatherings with Friends and Family: Not on file    Attends Restorationist Services: Not on file    Active Member of [address] [place] [address] or Organizations: Not on file    Attends Club or Organization Meetings: Not on file    Marital Status: Not on file   Intimate Partner Violence:     Fear of Current or Ex-Partner: Not on file    Emotionally Abused: Not on file    Physically Abused: Not on file    Sexually Abused: Not on file   Housing Stability:     Unable to Pay for Housing in the Last Year: Not on file    Number of [place] in the Last Year: Not on file    Unstable Housing in the Last Year: Not on file     Pulse 98, height 5' 7\" (1.702 m), weight 230 lb (104.3 kg), SpO2 100 %, not currently breastfeeding. Physical Exam  Constitutional:       General: She is not in acute distress. Appearance: Normal appearance. She is well-developed. Comments: Significant central obesity, uses a walker for mobility   Eyes:      General: No scleral icterus. Cardiovascular:      Rate and Rhythm: Normal rate and regular rhythm.    Pulmonary:      Effort: Pulmonary effort is normal.      Breath sounds: Normal breath sounds. Abdominal:      General: Bowel sounds are normal. There is no distension. Palpations: Abdomen is soft. There is no mass. Tenderness: There is no abdominal tenderness. There is no guarding or rebound. Musculoskeletal:         General: Normal range of motion. Lymphadenopathy:      Cervical: No cervical adenopathy. Neurological:      Mental Status: She is alert and oriented to person, place, and time. Psychiatric:         Behavior: Behavior normal.         Thought Content: Thought content normal.         Judgment: Judgment normal.       Laboratory, Pathology, Radiology reviewed indetail with relevant important investigations summarized below:  Lab Results   Component Value Date    WBC 9.2 10/11/2021    HGB 9.7 (L) 10/11/2021    HCT 27.6 (L) 10/11/2021    MCV 92.6 10/11/2021     10/11/2021     Lab Results   Component Value Date    IRON 82 12/27/2020    TIBC 194 12/27/2020    FERRITIN 795.1 (H) 01/22/2021     Lab Results   Component Value Date    GENEJSSN83 918 05/26/2020      Lab Results   Component Value Date    FOLATE 16.8 05/26/2020     Lab Results   Component Value Date    LABPROT 3.4 (H) 10/03/2019    LABPROT 3.4 10/03/2019    LABALBU 3.9 10/11/2021      Lab Results   Component Value Date    ALT 16 10/11/2021    AST 20 10/11/2021    ALKPHOS 111 10/11/2021    BILITOT 0.5 10/11/2021     CT scan abdomen and pelvis: 10/11/2021: No acute findings    Assessment and Plan:  59 y.o. female with 1 month history of significant postprandial diarrhea, unclear etiology, no risk factors apart from multiple comorbidities and polypharmacy noted. 1. Diarrhea, unspecified type  - Calprotectin Stool; Future  - Clostridium difficile EIA; Future  - pH, stool; Future  - Potassium, stool; Future  - Sodium, stool; Future    -We will consider antidiarrheals after completion of stool studies    Return in about 4 weeks (around 1/11/2022).     Lakeshia Ying MD   Staff Gastroenterologist  [facility] [address]    Please note this report has been partially produced using speech recognition software and may cause or contain errors related to thatsystem including grammar, punctuation and spelling as well as words and phrases that may seem inappropriate. If there are questions or concerns please feel free to contact me to clarify.

## 2021-12-16 ENCOUNTER — TELEPHONE (OUTPATIENT)
Dept: CARDIOLOGY CLINIC | Age: 61
End: 2021-12-16

## 2021-12-16 ENCOUNTER — CARE COORDINATION (OUTPATIENT)
Dept: CARE COORDINATION | Age: 61
End: 2021-12-16

## 2021-12-16 DIAGNOSIS — I95.3 HEMODIALYSIS-ASSOCIATED HYPOTENSION: Primary | ICD-10-CM

## 2021-12-16 RX ORDER — MIDODRINE HYDROCHLORIDE 10 MG/1
TABLET ORAL
Qty: 15 TABLET | Refills: 5 | Status: SHIPPED | OUTPATIENT
Start: 2021-12-16 | End: 2022-03-16 | Stop reason: DRUGHIGH

## 2021-12-16 NOTE — CARE COORDINATION
ACM called patient. Left message requesting a return call for [facility] out reach. Contact information supplied.

## 2021-12-17 ENCOUNTER — CARE COORDINATION (OUTPATIENT)
Dept: CARE COORDINATION | Age: 61
End: 2021-12-17

## 2021-12-17 NOTE — CARE COORDINATION
Ambulatory Care Coordination Note  12/17/2021  CM Risk Score: 17  Charlson 10 Year Mortality Risk Score: 100%     ACC: Luca Pryor, RN    Summary Note: ACM spoke to patient. She reports doing well overall. ACM reviewed CHF and the holidays education. Patient was advised CHF zones heart failure and meds and food swaps and recipe list have been mailed to her home. Patient states that she finishes up with outpatient physical therapy at the end of this month. Patient states that she was denied wheelchair from insurance. Patient states that she is using her Rollator and  denied any falls. Patient continues with hemodialysis 3 days a week. Patient states she is compliant with her medications and all her follow-up appointments. Patient is requesting the letter to obtain disability placard. Patient was advised that she has an appointment on Monday with PCP obtain the letter Monday. Patient was advised that she needs to take the letter to the license [place] office to obtain her placard's. Patient verbalized understanding. ACM reviewed CHF education. Michelle received counseling on the following healthy behaviors: SELF MANAGEMENT of chronic health conditions,with goal to improve quality of life and overall wellbeing. The patient is asked to make an attempt to improve diet and exercise patterns to aid in medical management. I have instructed Michelle to complete a self tracking handout on Blood Sugars , Blood Pressures  and Weights and instructed them to bring it with them to her next appointment. Discussed use, benefit, and side effects of prescribed medications. Barriers to medication compliance addressed. All patient questions answered. Pt voiced understanding. The importance of daily weights, dietary sodium restriction, and contact with cardiology if weight is increased more than 3 lbs in any 48 hour period was stressed.  The patient has been advised to contact us if they experience progressive medications    Medication Sig Start Date End Date Taking? Authorizing Provider   midodrine (PROAMATINE) 10 MG tablet Take one tablet on days of dialysis treatment. three times weekly 12/16/21   Sam Couch MD   vitamin B-12 (CYANOCOBALAMIN) 100 MCG tablet  11/24/21   Historical Provider, MD   hydrOXYzine (ATARAX) 25 MG tablet TAKE ONE TABLET BY MOUTH TWO TIMES A DAY 11/13/21   Historical Provider, MD   isosorbide mononitrate (IMDUR) 30 MG extended release tablet TAKE FOUR (4) TABLETS BY MOUTH EVERY DAY 11/14/21   Historical Provider, MD   [id number] 380715 UNIT/GM powder APPLY TO AFFECTED AREA OF ABDOMINAL FOLDS EVERY 12 HOURS AS NEEDED 11/12/21   Historical Provider, MD   sertraline (ZOLOFT) 50 MG tablet TAKE 1 TABLET BY MOUTH DAILY 11/20/21   Historical Provider, MD   insulin glargine (LANTUS SOLOSTAR) 100 UNIT/ML injection pen 70 UNITS AT BEDTIME 12/13/21   Denilson Augustin MD   insulin aspart (NOVOLOG FLEXPEN) 100 UNIT/ML injection pen 15 UNITS PLUS SLIDING SCALE   LESS THAN 180 NONE  181-250 2 UNITS  251-300 4 UNITS  301-400 6 UNITS   401-500 10 UNITS 12/13/21   Denilson Augustin MD   blood glucose test strips (ONETOUCH VERIO) strip QID 12/13/21   Denilson Augustin MD   OneTouch Delica Lancets 20L MISC QID 12/13/21   Denilson Augustin MD   Blood Glucose Monitoring Suppl (ONETOUCH VERIO) w/Device KIT AS DIRECTED 12/13/21   Denilson Augustin MD   pantoprazole (PROTONIX) 20 MG tablet TAKE 1 TABLET BY MOUTH DAILY 10/26/21   Sam Couch MD   BRILINTA 90 MG TABS tablet TAKE ONE(1) TABLET TWICE DAILY 10/26/21   Sam Couch MD   ARIPiprazole (ABILIFY) 5 MG tablet TAKE 1 TABLET BY MOUTH ONCE DAILY 10/22/21   Shruti Ramirez MD   pregabalin (LYRICA) 75 MG capsule Take 1 capsule by mouth 2 times daily for 30 days. Take 75 mg by mouth 2 times daily.  10/22/21 11/21/21  Shruti Ramirez MD   Insulin Pen Needle (SURE COMFORT PEN NEEDLES) 30G X 8 MM MISC USE AS DIRECTED FIVE TIMES A DAILY 10/20/21   Denilson Augustin MD   ranolazine (RANEXA) 500 MG extended release tablet TAKE 1 TABLET BY MOUTH TWICE DAILY 10/15/21   Kaity Berrios MD   b complex-C-folic acid (NEPHROCAPS) 1 MG capsule Take 1 capsule by mouth daily    Historical Provider, MD   LANTUS SOLOSTAR 100 UNIT/ML injection pen 55 units at bedtime 10/8/21   Bradley Liu MD   zoster recombinant adjuvanted vaccine [facility]) 50 MCG/0.5ML SUSR injection Inject 0.5 mLs into the muscle See Admin Instructions 1 dose now and repeat in 2-6 months 9/17/21 3/16/22  Mando Orlando MD   metoprolol tartrate (LOPRESSOR) 25 MG tablet TAKE 1/2 TABLET BY MOUTH TWO TIMES DAILY  9/17/21   Mando Orlando MD   insulin aspart (NOVOLOG FLEXPEN) 100 UNIT/ML injection pen INJECT 8-10  units with each meals  Patient taking differently: Inject 0-8 Units into the skin 3 times daily (before meals) And per sliding scale fo 0-150=0 units; 151-200= 2units; 201-250= 4 units; 251-300=6units; 301-350=8 units; 351-400=1Call MD/  CNP 6/28/21   Bradley Liu MD   melatonin 10 MG CAPS capsule Take 1 capsule by mouth nightly 6/23/21   Mando Orlando MD   aspirin EC 81 MG EC tablet Take 1 tablet by mouth daily 5/25/21   Mando Orlando MD   magnesium oxide (MAG-OX) 400 MG tablet Take 1 tablet by mouth daily 4/28/21   Mando Orlando MD   atorvastatin (LIPITOR) 40 MG tablet Take 1 tablet by mouth daily 4/28/21   Mando Orlando MD   blood glucose test strips (FREESTYLE LITE) strip 1 each by Does not apply route 4 times daily (before meals and nightly) As needed. 3/12/21   MARCELO Jackson   Alcohol Swabs (EASY TOUCH ALCOHOL PREP MEDIUM) 70 % PADS USE AS DIRECTED THREE TIMES A DAY 3/12/21   MARCELO Jackson   FreeStyle Lancets MISC Test 4x daily 3/11/21   MARCELO Jackson   docusate sodium (COLACE, DULCOLAX) 100 MG CAPS Take 100 mg by mouth 2 times daily For the prevention and treatment of constipation while taking pain medications.   Patient not taking: Reported on 12/14/2021 2/24/21   Lena Dixon PA-C   acetaminophen (TYLENOL) 325 MG tablet Take 2 tablets by mouth every 4 hours as needed for Pain (For mild to moderate pain (Pain 1-6 out of 10 on pain scale)) 2/24/21   Lena Dixon PA-C   Blood Glucose Monitoring Suppl (FREESTYLE LITE) CORETTA 1 Device by Does not apply route daily as needed (Diabetes) Use freestyle meter to test blood sugar as needed 12/29/20   MARCELO Ma   nitroGLYCERIN (NITROSTAT) 0.4 MG SL tablet Place 1 tablet under the tongue every 5 minutes as needed for Chest pain 9/22/15   Historical Provider, MD       Future Appointments   Date Time Provider Aminata Cortes   12/20/2021 12:30 PM Prema Simmons MD [facility]   1/17/2022 11:00 AM LORETO Pineda [facility]   3/14/2022 10:45 AM Pari Monteiro MD [address] [place] [address]   3/24/2022 12:30 PM Flaco Hahn MD [address] [place] [address]   5/10/2022 12:00 PM Loyd Rosales

## 2021-12-22 ENCOUNTER — HOSPITAL ENCOUNTER (EMERGENCY)
Age: 61
Discharge: HOME | End: 2021-12-22
Attending: EMERGENCY MEDICINE
Payer: MEDICARE

## 2021-12-22 ENCOUNTER — APPOINTMENT (OUTPATIENT)
Dept: GENERAL RADIOLOGY | Age: 61
End: 2021-12-22
Payer: MEDICARE

## 2021-12-22 VITALS
TEMPERATURE: 97.8 F | HEIGHT: 67 IN | RESPIRATION RATE: 18 BRPM | BODY MASS INDEX: 35.63 KG/M2 | HEART RATE: 79 BPM | WEIGHT: 227 LBS | OXYGEN SATURATION: 95 % | SYSTOLIC BLOOD PRESSURE: 138 MMHG | DIASTOLIC BLOOD PRESSURE: 62 MMHG

## 2021-12-22 DIAGNOSIS — J45.41 MODERATE PERSISTENT ASTHMA WITH EXACERBATION: ICD-10-CM

## 2021-12-22 DIAGNOSIS — J44.1 COPD WITH ACUTE EXACERBATION (HCC): Primary | ICD-10-CM

## 2021-12-22 LAB
ALBUMIN SERPL-MCNC: 4.1 G/DL (ref 3.5–4.6)
ALP BLD-CCNC: 104 U/L (ref 40–130)
ALT SERPL-CCNC: 17 U/L (ref 0–33)
ANION GAP SERPL CALCULATED.3IONS-SCNC: 15 MEQ/L (ref 9–15)
AST SERPL-CCNC: 20 U/L (ref 0–35)
BASOPHILS ABSOLUTE: 0.1 K/UL (ref 0–0.2)
BASOPHILS RELATIVE PERCENT: 0.6 %
BILIRUB SERPL-MCNC: 0.9 MG/DL (ref 0.2–0.7)
BUN BLDV-MCNC: 30 MG/DL (ref 8–23)
CALCIUM SERPL-MCNC: 9.2 MG/DL (ref 8.5–9.9)
CHLORIDE BLD-SCNC: 96 MEQ/L (ref 95–107)
CO2: 25 MEQ/L (ref 20–31)
CREAT SERPL-MCNC: 4 MG/DL (ref 0.5–0.9)
EKG ATRIAL RATE: 73 BPM
EKG P AXIS: 66 DEGREES
EKG P-R INTERVAL: 254 MS
EKG Q-T INTERVAL: 466 MS
EKG QRS DURATION: 134 MS
EKG QTC CALCULATION (BAZETT): 513 MS
EKG R AXIS: -50 DEGREES
EKG T AXIS: 54 DEGREES
EKG VENTRICULAR RATE: 73 BPM
EOSINOPHILS ABSOLUTE: 0.2 K/UL (ref 0–0.7)
EOSINOPHILS RELATIVE PERCENT: 1.9 %
GFR AFRICAN AMERICAN: 13.6
GFR NON-AFRICAN AMERICAN: 11.3
GLOBULIN: 3.7 G/DL (ref 2.3–3.5)
GLUCOSE BLD-MCNC: 314 MG/DL (ref 70–99)
HCT VFR BLD CALC: 30.1 % (ref 37–47)
HEMOGLOBIN: 10.4 G/DL (ref 12–16)
LYMPHOCYTES ABSOLUTE: 1.1 K/UL (ref 1–4.8)
LYMPHOCYTES RELATIVE PERCENT: 10.7 %
MAGNESIUM: 2.7 MG/DL (ref 1.7–2.4)
MCH RBC QN AUTO: 31.3 PG (ref 27–31.3)
MCHC RBC AUTO-ENTMCNC: 34.5 % (ref 33–37)
MCV RBC AUTO: 91 FL (ref 82–100)
MONOCYTES ABSOLUTE: 0.8 K/UL (ref 0.2–0.8)
MONOCYTES RELATIVE PERCENT: 7.8 %
NEUTROPHILS ABSOLUTE: 7.9 K/UL (ref 1.4–6.5)
NEUTROPHILS RELATIVE PERCENT: 79 %
PDW BLD-RTO: 15.5 % (ref 11.5–14.5)
PLATELET # BLD: 156 K/UL (ref 130–400)
POTASSIUM SERPL-SCNC: 4.2 MEQ/L (ref 3.4–4.9)
RBC # BLD: 3.31 M/UL (ref 4.2–5.4)
SARS-COV-2, NAAT: NOT DETECTED
SODIUM BLD-SCNC: 136 MEQ/L (ref 135–144)
TOTAL PROTEIN: 7.8 G/DL (ref 6.3–8)
TROPONIN: 0.05 NG/ML (ref 0–0.01)
WBC # BLD: 10.1 K/UL (ref 4.8–10.8)

## 2021-12-22 PROCEDURE — 99284 EMERGENCY DEPT VISIT MOD MDM: CPT

## 2021-12-22 PROCEDURE — 85025 COMPLETE CBC W/AUTO DIFF WBC: CPT

## 2021-12-22 PROCEDURE — 93010 ELECTROCARDIOGRAM REPORT: CPT | Performed by: INTERNAL MEDICINE

## 2021-12-22 PROCEDURE — 36415 COLL VENOUS BLD VENIPUNCTURE: CPT

## 2021-12-22 PROCEDURE — 96375 TX/PRO/DX INJ NEW DRUG ADDON: CPT

## 2021-12-22 PROCEDURE — 84484 ASSAY OF TROPONIN QUANT: CPT

## 2021-12-22 PROCEDURE — 80053 COMPREHEN METABOLIC PANEL: CPT

## 2021-12-22 PROCEDURE — 87635 SARS-COV-2 COVID-19 AMP PRB: CPT

## 2021-12-22 PROCEDURE — 6360000002 HC RX W HCPCS: Performed by: EMERGENCY MEDICINE

## 2021-12-22 PROCEDURE — 6370000000 HC RX 637 (ALT 250 FOR IP): Performed by: EMERGENCY MEDICINE

## 2021-12-22 PROCEDURE — 94640 AIRWAY INHALATION TREATMENT: CPT

## 2021-12-22 PROCEDURE — 93005 ELECTROCARDIOGRAM TRACING: CPT | Performed by: EMERGENCY MEDICINE

## 2021-12-22 PROCEDURE — 94761 N-INVAS EAR/PLS OXIMETRY MLT: CPT

## 2021-12-22 PROCEDURE — 71045 X-RAY EXAM CHEST 1 VIEW: CPT

## 2021-12-22 PROCEDURE — 96365 THER/PROPH/DIAG IV INF INIT: CPT

## 2021-12-22 PROCEDURE — 83735 ASSAY OF MAGNESIUM: CPT

## 2021-12-22 RX ORDER — AZITHROMYCIN 500 MG/1
500 TABLET, FILM COATED ORAL ONCE
Status: COMPLETED | OUTPATIENT
Start: 2021-12-22 | End: 2021-12-22

## 2021-12-22 RX ORDER — MAGNESIUM SULFATE IN WATER 40 MG/ML
2000 INJECTION, SOLUTION INTRAVENOUS ONCE
Status: COMPLETED | OUTPATIENT
Start: 2021-12-22 | End: 2021-12-22

## 2021-12-22 RX ORDER — 0.9 % SODIUM CHLORIDE 0.9 %
1000 INTRAVENOUS SOLUTION INTRAVENOUS ONCE
Status: DISCONTINUED | OUTPATIENT
Start: 2021-12-22 | End: 2021-12-22 | Stop reason: HOSPADM

## 2021-12-22 RX ORDER — ALBUTEROL SULFATE 2.5 MG/3ML
2.5 SOLUTION RESPIRATORY (INHALATION)
Status: COMPLETED | OUTPATIENT
Start: 2021-12-22 | End: 2021-12-22

## 2021-12-22 RX ORDER — AZITHROMYCIN 250 MG/1
TABLET, FILM COATED ORAL
Qty: 1 PACKET | Refills: 0 | Status: SHIPPED | OUTPATIENT
Start: 2021-12-22 | End: 2021-12-26

## 2021-12-22 RX ORDER — ALBUTEROL SULFATE 2.5 MG/3ML
2.5 SOLUTION RESPIRATORY (INHALATION) EVERY 4 HOURS PRN
Qty: 120 EACH | Refills: 3 | Status: SHIPPED | OUTPATIENT
Start: 2021-12-22

## 2021-12-22 RX ORDER — METHYLPREDNISOLONE SODIUM SUCCINATE 125 MG/2ML
125 INJECTION, POWDER, LYOPHILIZED, FOR SOLUTION INTRAMUSCULAR; INTRAVENOUS ONCE
Status: COMPLETED | OUTPATIENT
Start: 2021-12-22 | End: 2021-12-22

## 2021-12-22 RX ORDER — PREDNISONE 20 MG/1
40 TABLET ORAL DAILY
Qty: 20 TABLET | Refills: 0 | Status: SHIPPED | OUTPATIENT
Start: 2021-12-22 | End: 2022-01-01

## 2021-12-22 RX ADMIN — METHYLPREDNISOLONE SODIUM SUCCINATE 125 MG: 125 INJECTION, POWDER, FOR SOLUTION INTRAMUSCULAR; INTRAVENOUS at 11:16

## 2021-12-22 RX ADMIN — AZITHROMYCIN MONOHYDRATE 500 MG: 500 TABLET ORAL at 11:16

## 2021-12-22 RX ADMIN — ALBUTEROL SULFATE 2.5 MG: 2.5 SOLUTION RESPIRATORY (INHALATION) at 12:55

## 2021-12-22 RX ADMIN — MAGNESIUM SULFATE HEPTAHYDRATE 2000 MG: 40 INJECTION, SOLUTION INTRAVENOUS at 11:16

## 2021-12-22 RX ADMIN — ALBUTEROL SULFATE 2.5 MG: 2.5 SOLUTION RESPIRATORY (INHALATION) at 12:47

## 2021-12-22 RX ADMIN — ALBUTEROL SULFATE 2.5 MG: 2.5 SOLUTION RESPIRATORY (INHALATION) at 12:51

## 2021-12-22 ASSESSMENT — ENCOUNTER SYMPTOMS
COUGH: 1
SHORTNESS OF BREATH: 1
ABDOMINAL PAIN: 0
NAUSEA: 0
DIARRHEA: 0
SORE THROAT: 0
VOMITING: 0
BACK PAIN: 0

## 2021-12-22 NOTE — ED PROVIDER NOTES
[address] [facility] ED  eMERGENCYdEPARTMENT eNCOUnter      Pt Name: Adin Kaplan  MRN: [id number]  Addygfnidhi 1960  Date of evaluation: 12/22/2021  Trudy Norman MD    CHIEF COMPLAINT           HPI  Adin Kaplan is a 59 y.o. female per chart review has a h/o CKD, schizophrenia, ESRD on dialysis TTS, asthma/COPD, HTN, hpl, COPD, DM II, presents to the ED with sob, cough. Pt notes gradual onset, moderate, constant, diffuse sob x 3 days. +Cough. Pt denies fever, n/v, cp, ab pain, dysuria, diarrhea. ROS  Review of Systems   Constitutional: Negative for activity change, chills and fever. HENT: Negative for ear pain and sore throat. Eyes: Negative for visual disturbance. Respiratory: Positive for cough and shortness of breath. Cardiovascular: Negative for chest pain, palpitations and leg swelling. Gastrointestinal: Negative for abdominal pain, diarrhea, nausea and vomiting. Genitourinary: Negative for dysuria. Musculoskeletal: Negative for back pain. Skin: Negative for rash. Neurological: Negative for dizziness and weakness. Except as noted above the remainder of the review of systems was reviewed and negative.        PAST MEDICAL HISTORY     Past Medical History:   Diagnosis Date    Angina at rest [facility]) 5/24/2020    Anxiety     CAD S/P percutaneous coronary angioplasty 2015, 2018    stents per dr Asha Bloom    CHF (congestive heart failure) ([address]     CKD (chronic kidney disease) stage 4, GFR 15-29 ml/min ([address] 2/24/2018    CKD stage 4 due to type 2 diabetes mellitus ([address]     Contusion of right chest wall 2/16/2021    COPD (chronic obstructive pulmonary disease) ([address]     Diabetic nephropathy with proteinuria ([address] 2014    DJD (degenerative joint disease) of knee     Dr Gregorio Acuña GERD (gastroesophageal reflux disease)     Hemiparesis, left ([address] 2013    entered Assisted Living ([place])    Hemodialysis patient [facility])    Francisco J Lewis Hemodialysis-associated hypotension 10/22/2021    History of heart failure     History of seizures     History of type C viral hepatitis     HTN (hypertension)     Hyperlipidemia     Impaired mobility and activities of daily living     Mediastinal lymphadenopathy     Terrance Farr    Metabolic syndrome     Moderate persistent asthma without complication     Need for extended care facility 2021    Neurogenic urinary incontinence 2013    Neuropathy in diabetes [facility])     Nonrheumatic mitral valve regurgitation 2021    Nonrheumatic tricuspid valve regurgitation 2021    Obesity (BMI 30-39. 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 Alexandria Stern      x1 Dr. Nori Mcburney, Dr Coleman Snare 2018   Lilly Hou  08/10/2021    [facility]    DIAGNOSTIC CARDIAC CATH LAB PROCEDURE  10/02/2019    HYSTERECTOMY, TOTAL ABDOMINAL      one ovary intact, Dr George Min, menorrhagia    NC TOTAL KNEE ARTHROPLASTY Left 2018    LEFT KNEE TOTAL KNEE ARTHROPLASTY, SHAYNA, NERVE BLOCK performed by Mustapha Mata MD at [address] [place] [address] [facility]      TOE AMPUTATION Right     TOTAL KNEE ARTHROPLASTY  16     Ed[facility]    TUNNELED VENOUS CATHETER PLACEMENT Right 07/01/2020    tunneled HD catheter per Dr Pasquale Velazco       Previous Medications    ACETAMINOPHEN (TYLENOL) 325 MG TABLET    Take 2 tablets by mouth every 4 hours as needed for Pain (For mild to moderate pain (Pain 1-6 out of 10 on pain scale))    ALCOHOL SWABS (EASY TOUCH ALCOHOL PREP MEDIUM) 70 % PADS    USE AS DIRECTED THREE TIMES A DAY    ARIPIPRAZOLE (ABILIFY) 5 MG TABLET    TAKE 1 TABLET BY MOUTH ONCE DAILY    ASPIRIN EC 81 MG EC TABLET    Take 1 tablet by mouth daily    ATORVASTATIN (LIPITOR) 40 MG TABLET    Take 1 tablet by mouth daily    B COMPLEX-C-FOLIC ACID (NEPHROCAPS) 1 MG CAPSULE    Take 1 capsule by mouth daily    BLOOD GLUCOSE MONITORING SUPPL (FREESTYLE LITE) CORETTA    1 Device by Does not apply route daily as needed (Diabetes) Use freestyle meter to test blood sugar as needed    BLOOD GLUCOSE MONITORING SUPPL (ONETOUCH VERIO) W/DEVICE KIT    AS DIRECTED    BLOOD GLUCOSE TEST STRIPS (FREESTYLE LITE) STRIP    1 each by Does not apply route 4 times daily (before meals and nightly) As needed. BLOOD GLUCOSE TEST STRIPS (ONETOUCH VERIO) STRIP    QID    BRILINTA 90 MG TABS TABLET    TAKE ONE(1) TABLET TWICE DAILY    DOCUSATE SODIUM (COLACE, DULCOLAX) 100 MG CAPS    Take 100 mg by mouth 2 times daily For the prevention and treatment of constipation while taking pain medications.     FREESTYLE LANCETS MISC    Test 4x daily    HYDROXYZINE (ATARAX) 25 MG TABLET    TAKE ONE TABLET BY MOUTH TWO TIMES A DAY    INSULIN ASPART (NOVOLOG FLEXPEN) 100 UNIT/ML INJECTION PEN    INJECT 8-10  units with each meals    INSULIN ASPART (NOVOLOG FLEXPEN) 100 UNIT/ML INJECTION PEN    15 UNITS PLUS SLIDING SCALE   LESS THAN 180 NONE  181-250 2 UNITS  251-300 4 UNITS  301-400 6 UNITS   401-500 10 UNITS    INSULIN GLARGINE (LANTUS SOLOSTAR) 100 UNIT/ML INJECTION PEN    70 UNITS AT BEDTIME    INSULIN PEN NEEDLE (SURE COMFORT PEN NEEDLES) 30G X 8 MM MISC USE AS DIRECTED FIVE TIMES A DAILY    ISOSORBIDE MONONITRATE (IMDUR) 30 MG EXTENDED RELEASE TABLET    TAKE FOUR (4) TABLETS BY MOUTH EVERY DAY    LANTUS SOLOSTAR 100 UNIT/ML INJECTION PEN    55 units at bedtime    MAGNESIUM OXIDE (MAG-OX) 400 MG TABLET    Take 1 tablet by mouth daily    MELATONIN 10 MG CAPS CAPSULE    Take 1 capsule by mouth nightly    METOPROLOL TARTRATE (LOPRESSOR) 25 MG TABLET    TAKE 1/2 TABLET BY MOUTH TWO TIMES DAILY     MIDODRINE (PROAMATINE) 10 MG TABLET    Take one tablet on days of dialysis treatment. three times weekly    NITROGLYCERIN (NITROSTAT) 0.4 MG SL TABLET    Place 1 tablet under the tongue every 5 minutes as needed for Chest pain    NYAMYC 432987 UNIT/GM POWDER    APPLY TO AFFECTED AREA OF ABDOMINAL FOLDS EVERY 12 HOURS AS NEEDED    ONETOUCH DELICA LANCETS 37A MISC    QID    PANTOPRAZOLE (PROTONIX) 20 MG TABLET    TAKE 1 TABLET BY MOUTH DAILY    PREGABALIN (LYRICA) 75 MG CAPSULE    Take 1 capsule by mouth 2 times daily for 30 days. Take 75 mg by mouth 2 times daily.     RANOLAZINE (RANEXA) 500 MG EXTENDED RELEASE TABLET    TAKE 1 TABLET BY MOUTH TWICE DAILY    SERTRALINE (ZOLOFT) 50 MG TABLET    TAKE 1 TABLET BY MOUTH DAILY    VITAMIN B-12 (CYANOCOBALAMIN) 100 MCG TABLET        ZOSTER RECOMBINANT ADJUVANTED VACCINE (SHINGRIX) 50 MCG/0.5ML SUSR INJECTION    Inject 0.5 mLs into the muscle See Admin Instructions 1 dose now and repeat in 2-6 months       ALLERGIES     Codeine and Oxycontin [oxycodone hcl]    FAMILY HISTORY       Family History   Problem Relation Age of Onset    Cancer Mother 76        survived   [de-identified] Breast Cancer Mother     Hypertension Father     Diabetes Sister     Mental Illness Sister     Colon Cancer Neg Hx           SOCIAL HISTORY       Social History     Socioeconomic History    Marital status:      Spouse name: None    Number of children: 2    Years of education: None    Highest education level: None   Occupational History    Occupation: disabled   Tobacco Use    Smoking status: Never Smoker    Smokeless tobacco: Never Used   Vaping Use    Vaping Use: Never used   Substance and Sexual Activity    Alcohol use: No     Alcohol/week: 0.0 standard drinks    Drug use: No    Sexual activity: Not Currently   Other Topics Concern    None   Social History Narrative    Born in [place], one of 5    Twin sister Reyes, very ill in 2018, [place] [address]    Moved to [facility], , 2 children, one son and one daughter    Worked at [employer], as a nurse's aide    Disabled due to mental illness    Lived at [facility], was discharged, returned to independent living in 2017 in the daughter's house and has adjusted well    One son and one daughter, live in the same house with patient, Gloria Gonzalez pays the rent    Connected reading ([employer])        10/11/2021 [facility] updates; patient lives with her daughter son-in-law and 2 grandchildren and patient's handicapped son. Per daughter, her brother is blind, MRDD, multiple health issues. Daughter's  is patient's legal guardian. Patient has hemodialysis Tuesday Thursday and Saturday. Patient's bedroom is on main floor with a half bath. Daughter walks patient upstairs once weekly for full bath. Patient is using her walker in the home. Patient has a hospital bed in the home. Social Determinants of Health     Financial Resource Strain: Low Risk     Difficulty of Paying Living Expenses: Not hard at all   Food Insecurity: No Food Insecurity    Worried About Running Out of Food in the Last Year: Never true    Yung of Food in the Last Year: Never true   Transportation Needs: No Transportation Needs    Lack of Transportation (Medical): No    Lack of Transportation (Non-Medical):  No   Physical Activity:     Days of Exercise per Week: Not on file    Minutes of Exercise per Session: Not on file   Stress:     Feeling of Stress : Not on file   Social Connections:     Frequency of Mood and Affect: Mood normal.           MDM  58 yo female presents to the ED with sob, cough. Pt is afebrile, hemodynamically stable. Pt given albuterol nebs x 3, IV solumedrol, IV Mg, PO azithro in the ED. EKG shows NSR with HR 73, RBBB, LAFB, normal axis, normal intervals, no ST changes. Labs remarkable for glucose 314, BUN 30, Cr 4, Hb 10.4, troponin 0.049. CXR negative. Pt reassessed and feels completely better. Suspect pt with COPD/asthma exacerbation. Pt requesting to go home. Pt given prescription for albuterol, prednisone, azithromycin. Pt will f/u with pcp. Pt understands plan. FINAL IMPRESSION      1. COPD with acute exacerbation ([facility]    2.  Moderate persistent asthma with exacerbation          DISPOSITION/PLAN   DISPOSITION Decision To Discharge 12/22/2021 01:24:20 PM        DISCHARGE MEDICATIONS:  [unfilled]         Maximiliano Bennett MD(electronically signed)  Attending Emergency Physician            Maximiliano Bennett MD  12/22/21 0062

## 2021-12-22 NOTE — ED TRIAGE NOTES
Patient to ER for shortness of breath and cough that began three days prior. She reports a history of COPD, does not require home oxygen. She has access to a nebulizer at home and medications. No medications PTA. Denies chest/abdominal pain. Denies changes to appetite, tolerating fluids and food.

## 2021-12-22 NOTE — ED NOTES
Patient given discharge instructions and prescriptions and verbalized understanding. Vital signs stable. Resp even and unlabored. Skin warm, dry and intact. Patient is alert and oriented. IV removed. Patient doesn't have any questions at this time.         Linda Talbert RN  12/22/21 [id number]

## 2022-01-06 ENCOUNTER — CARE COORDINATION (OUTPATIENT)
Dept: CARE COORDINATION | Age: 62
End: 2022-01-06

## 2022-01-06 NOTE — CARE COORDINATION
ACM called patient. Left message requesting a return call for [facility] out reach contact information supplied.

## 2022-01-12 ENCOUNTER — APPOINTMENT (OUTPATIENT)
Dept: GENERAL RADIOLOGY | Age: 62
DRG: 280 | End: 2022-01-12
Payer: MEDICARE

## 2022-01-12 ENCOUNTER — APPOINTMENT (OUTPATIENT)
Dept: CT IMAGING | Age: 62
DRG: 280 | End: 2022-01-12
Payer: MEDICARE

## 2022-01-12 ENCOUNTER — HOSPITAL ENCOUNTER (INPATIENT)
Age: 62
LOS: 2 days | Discharge: ANOTHER HOSPITAL | DRG: 280 | End: 2022-01-15
Attending: STUDENT IN AN ORGANIZED HEALTH CARE EDUCATION/TRAINING PROGRAM | Admitting: INTERNAL MEDICINE
Payer: MEDICARE

## 2022-01-12 DIAGNOSIS — R06.89 DYSPNEA AND RESPIRATORY ABNORMALITIES: ICD-10-CM

## 2022-01-12 DIAGNOSIS — R06.00 DYSPNEA AND RESPIRATORY ABNORMALITIES: ICD-10-CM

## 2022-01-12 DIAGNOSIS — N18.5 CHRONIC RENAL FAILURE SYNDROME, STAGE 5 (HCC): ICD-10-CM

## 2022-01-12 DIAGNOSIS — R07.9 CHEST PAIN, UNSPECIFIED TYPE: Primary | ICD-10-CM

## 2022-01-12 PROBLEM — I20.8 ANGINAL CHEST PAIN AT REST (HCC): Status: ACTIVE | Noted: 2022-01-12

## 2022-01-12 PROBLEM — I20.89 ANGINAL CHEST PAIN AT REST: Status: ACTIVE | Noted: 2022-01-12

## 2022-01-12 LAB
ALBUMIN SERPL-MCNC: 3.8 G/DL (ref 3.5–4.6)
ALP BLD-CCNC: 103 U/L (ref 40–130)
ALT SERPL-CCNC: 18 U/L (ref 0–33)
ANION GAP SERPL CALCULATED.3IONS-SCNC: 16 MEQ/L (ref 9–15)
AST SERPL-CCNC: 22 U/L (ref 0–35)
BASOPHILS ABSOLUTE: 0 K/UL (ref 0–0.2)
BASOPHILS RELATIVE PERCENT: 0.6 %
BILIRUB SERPL-MCNC: 0.6 MG/DL (ref 0.2–0.7)
BUN BLDV-MCNC: 28 MG/DL (ref 8–23)
CALCIUM SERPL-MCNC: 8.7 MG/DL (ref 8.5–9.9)
CHLORIDE BLD-SCNC: 96 MEQ/L (ref 95–107)
CO2: 25 MEQ/L (ref 20–31)
CREAT SERPL-MCNC: 4.77 MG/DL (ref 0.5–0.9)
EOSINOPHILS ABSOLUTE: 0.2 K/UL (ref 0–0.7)
EOSINOPHILS RELATIVE PERCENT: 2 %
GFR AFRICAN AMERICAN: 11.1
GFR NON-AFRICAN AMERICAN: 9.2
GLOBULIN: 3 G/DL (ref 2.3–3.5)
GLUCOSE BLD-MCNC: 267 MG/DL (ref 70–99)
HCT VFR BLD CALC: 30.2 % (ref 37–47)
HEMOGLOBIN: 10.1 G/DL (ref 12–16)
INFLUENZA A BY PCR: NEGATIVE
INFLUENZA B BY PCR: NEGATIVE
LYMPHOCYTES ABSOLUTE: 1.7 K/UL (ref 1–4.8)
LYMPHOCYTES RELATIVE PERCENT: 19.3 %
MCH RBC QN AUTO: 32 PG (ref 27–31.3)
MCHC RBC AUTO-ENTMCNC: 33.5 % (ref 33–37)
MCV RBC AUTO: 95.5 FL (ref 82–100)
MONOCYTES ABSOLUTE: 0.8 K/UL (ref 0.2–0.8)
MONOCYTES RELATIVE PERCENT: 9.5 %
NEUTROPHILS ABSOLUTE: 5.9 K/UL (ref 1.4–6.5)
NEUTROPHILS RELATIVE PERCENT: 68.6 %
PDW BLD-RTO: 17.9 % (ref 11.5–14.5)
PLATELET # BLD: 177 K/UL (ref 130–400)
POTASSIUM SERPL-SCNC: 4 MEQ/L (ref 3.4–4.9)
RBC # BLD: 3.16 M/UL (ref 4.2–5.4)
SARS-COV-2, NAAT: NOT DETECTED
SODIUM BLD-SCNC: 137 MEQ/L (ref 135–144)
TOTAL CK: 64 U/L (ref 0–170)
TOTAL PROTEIN: 6.8 G/DL (ref 6.3–8)
TROPONIN: 0.05 NG/ML (ref 0–0.01)
WBC # BLD: 8.6 K/UL (ref 4.8–10.8)

## 2022-01-12 PROCEDURE — 84484 ASSAY OF TROPONIN QUANT: CPT

## 2022-01-12 PROCEDURE — 99285 EMERGENCY DEPT VISIT HI MDM: CPT

## 2022-01-12 PROCEDURE — 36415 COLL VENOUS BLD VENIPUNCTURE: CPT

## 2022-01-12 PROCEDURE — G0378 HOSPITAL OBSERVATION PER HR: HCPCS

## 2022-01-12 PROCEDURE — 71275 CT ANGIOGRAPHY CHEST: CPT

## 2022-01-12 PROCEDURE — 85025 COMPLETE CBC W/AUTO DIFF WBC: CPT

## 2022-01-12 PROCEDURE — 87635 SARS-COV-2 COVID-19 AMP PRB: CPT

## 2022-01-12 PROCEDURE — 80053 COMPREHEN METABOLIC PANEL: CPT

## 2022-01-12 PROCEDURE — 71046 X-RAY EXAM CHEST 2 VIEWS: CPT

## 2022-01-12 PROCEDURE — 93005 ELECTROCARDIOGRAM TRACING: CPT | Performed by: STUDENT IN AN ORGANIZED HEALTH CARE EDUCATION/TRAINING PROGRAM

## 2022-01-12 PROCEDURE — 6360000004 HC RX CONTRAST MEDICATION: Performed by: STUDENT IN AN ORGANIZED HEALTH CARE EDUCATION/TRAINING PROGRAM

## 2022-01-12 PROCEDURE — 6370000000 HC RX 637 (ALT 250 FOR IP): Performed by: STUDENT IN AN ORGANIZED HEALTH CARE EDUCATION/TRAINING PROGRAM

## 2022-01-12 PROCEDURE — 82550 ASSAY OF CK (CPK): CPT

## 2022-01-12 PROCEDURE — 87502 INFLUENZA DNA AMP PROBE: CPT

## 2022-01-12 RX ORDER — NITROGLYCERIN 0.4 MG/1
0.4 TABLET SUBLINGUAL ONCE
Status: COMPLETED | OUTPATIENT
Start: 2022-01-12 | End: 2022-01-12

## 2022-01-12 RX ORDER — ACETAMINOPHEN 500 MG
1000 TABLET ORAL ONCE
Status: COMPLETED | OUTPATIENT
Start: 2022-01-12 | End: 2022-01-12

## 2022-01-12 RX ORDER — ASPIRIN 81 MG/1
324 TABLET, CHEWABLE ORAL ONCE
Status: COMPLETED | OUTPATIENT
Start: 2022-01-12 | End: 2022-01-12

## 2022-01-12 RX ADMIN — NITROGLYCERIN 0.4 MG: 0.4 TABLET, ORALLY DISINTEGRATING SUBLINGUAL at 16:53

## 2022-01-12 RX ADMIN — ACETAMINOPHEN 1000 MG: 500 TABLET ORAL at 21:37

## 2022-01-12 RX ADMIN — NITROGLYCERIN 0.5 INCH: 20 OINTMENT TOPICAL at 21:37

## 2022-01-12 RX ADMIN — ASPIRIN 324 MG: 81 TABLET, CHEWABLE ORAL at 21:36

## 2022-01-12 RX ADMIN — IOPAMIDOL 100 ML: 612 INJECTION, SOLUTION INTRAVENOUS at 17:40

## 2022-01-12 ASSESSMENT — ENCOUNTER SYMPTOMS
VOMITING: 0
BACK PAIN: 0
CHEST TIGHTNESS: 0
SINUS PRESSURE: 0
SHORTNESS OF BREATH: 1
TROUBLE SWALLOWING: 0
COUGH: 0
DIARRHEA: 0
ABDOMINAL PAIN: 0

## 2022-01-12 ASSESSMENT — PAIN DESCRIPTION - LOCATION: LOCATION: CHEST;ARM

## 2022-01-12 ASSESSMENT — PAIN DESCRIPTION - PAIN TYPE: TYPE: ACUTE PAIN

## 2022-01-12 ASSESSMENT — PAIN SCALES - GENERAL
PAINLEVEL_OUTOF10: 3
PAINLEVEL_OUTOF10: 6
PAINLEVEL_OUTOF10: 6

## 2022-01-12 ASSESSMENT — PAIN DESCRIPTION - DESCRIPTORS: DESCRIPTORS: SHARP

## 2022-01-12 ASSESSMENT — HEART SCORE: ECG: 1

## 2022-01-12 ASSESSMENT — PAIN DESCRIPTION - ORIENTATION: ORIENTATION: LEFT

## 2022-01-12 NOTE — ED PROVIDER NOTES
[address] [facility] ED  eMERGENCY dEPARTMENT eNCOUnter      Pt Name: Francine Mendoza  MRN: [id number]  [place] 1960  Date of evaluation: 1/12/2022  Provider: Loco Roberto, [address] [place] [address]       Chief Complaint   Patient presents with    Chest Pain     sob, falls          HISTORY OF PRESENT ILLNESS   (Location/Symptom, Timing/Onset,Context/Setting, Quality, Duration, Modifying Factors, Severity)  Note limiting factors. Francine Mendoza is a 59 y.o. female who presents to the emergency department with complaint of chest pain described as both sharp and dull worse with breathing. It is midsternal.  Patient denies any fever, chills or cough. Patient had followed yesterday after feeling weak and short of breath. Patient denies any injury. Patient has no palpable deformity to her extremities and moves all 4 extremities without any difficulty. Patient states that the shortness of breath and chest discomfort concerned her that is the reason she is in the emergency room for evaluation. Patient's had prior stress and has a heart cath in the past and the medical records that have shown prior disease. The history is provided by the patient. NursingNotes were reviewed. REVIEW OF SYSTEMS    (2-9 systems for level 4, 10 or more for level 5)     Review of Systems   Constitutional: Positive for fatigue. Negative for activity change, appetite change, chills, fever and unexpected weight change. HENT: Negative for drooling, ear pain, nosebleeds, sinus pressure and trouble swallowing. Respiratory: Positive for shortness of breath. Negative for cough and chest tightness. Cardiovascular: Positive for chest pain. Negative for leg swelling. Gastrointestinal: Negative for abdominal pain, diarrhea and vomiting. Endocrine: Negative for polydipsia and polyphagia. Genitourinary: Negative for dysuria, flank pain and frequency. Musculoskeletal: Negative for back pain and myalgias.    Skin: Negative for pallor and rash. Neurological: Negative for syncope, weakness and headaches. Hematological: Does not bruise/bleed easily. All other systems reviewed and are negative. Except as noted above the remainder of the review of systems was reviewed and negative. PAST MEDICAL HISTORY     Past Medical History:   Diagnosis Date    Angina at rest [facility]) 5/24/2020    Anxiety     CAD S/P percutaneous coronary angioplasty 2015, 2018    stents per dr Mali Harvey    [facility]est[facility] failure) [facility]     CKD (chronic kidney disease) stage 4, GFR 15-29 ml/min ([address] 2/24/2018    CKD stage 4 due to type 2 diabetes mellitus ([address]     Contusion of right chest wall 2/16/2021    COPD (chronic obstructive pulmonary disease) ([facility]     Diabetic nephropathy with proteinuria ([facility] 2014    DJD (degenerative joint disease) of knee     Dr Dora Anderson GERD (gastroesophageal reflux disease)     Hemiparesis, left ([facility] 2013    entered Assisted Living ([facility]    Hemodialysis patient [facility])     Hemodialysis-associated hypotension 10/22/2021    History of heart failure     History of seizures     History of type C viral hepatitis     HTN (hypertension)     Hyperlipidemia     Impaired mobility and activities of daily living     Mediastinal lymphadenopathy 2013    Michelle Collier    Metabolic syndrome     Moderate persistent asthma without complication 8/16/7717    Need for extended care facility 7/7/2021    Neurogenic urinary incontinence 2013    Neuropathy in diabetes [facility])     Nonrheumatic mitral valve regurgitation 7/7/2021    Nonrheumatic tricuspid valve regurgitation 7/7/2021    Obesity (BMI 30-39. 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 Lanie Chavis      x1 Dr. Coco Parmar, Dr Koffi Randall 2018   [address]  08/10/2021    [facility]    DIAGNOSTIC CARDIAC CATH LAB PROCEDURE  10/02/2019    HYSTERECTOMY, TOTAL ABDOMINAL      one ovary intact, Dr Shayy Harvey, menorrhagia    SD TOTAL KNEE ARTHROPLASTY Left 2018    LEFT KNEE TOTAL KNEE ARTHROPLASTY, SHAYNA, NERVE BLOCK performed by Richmond D[facility]az [facility] at [facility]      TOE AMPUTATION Right     TOTAL KNEE ARTHROPLASTY  16    Dr Frank Garcia TUNNELED [address] Right 2020    tunneled HD catheter per Dr Jarod Andrea       Previous Medications    ACETAMINOPHEN (TYLENOL) 325 MG TABLET    Take 2 tablets by mouth every 4 hours as needed for Pain (For mild to moderate pain (Pain 1-6 out of 10 on pain scale))    ALBUTEROL (PROVENTIL) (2.5 MG/3ML) 0.083% NEBULIZER SOLUTION    Take 3 mLs by nebulization every 4 hours as needed for Wheezing    ALCOHOL SWABS (EASY TOUCH ALCOHOL PREP MEDIUM) 70 % PADS    USE AS DIRECTED THREE TIMES A DAY    ARIPIPRAZOLE (ABILIFY) 5 MG TABLET    TAKE 1 TABLET BY MOUTH ONCE DAILY    ASPIRIN EC 81 MG EC TABLET    Take 1 tablet by mouth daily    ATORVASTATIN (LIPITOR) 40 MG TABLET    Take 1 tablet by mouth daily    B COMPLEX-C-FOLIC ACID (NEPHROCAPS) 1 MG CAPSULE    Take 1 capsule by mouth daily    BLOOD GLUCOSE MONITORING SUPPL (FREESTYLE LITE) CORETTA    1 Device by Does not apply route daily as needed (Diabetes) Use freestyle meter to test blood sugar as needed    BLOOD GLUCOSE MONITORING SUPPL (ONETOUCH VERIO) W/DEVICE KIT    AS DIRECTED    BLOOD GLUCOSE TEST STRIPS (FREESTYLE LITE) STRIP    1 each by Does not apply route 4 times daily (before meals and nightly) As needed. BLOOD GLUCOSE TEST STRIPS (ONETOUCH VERIO) STRIP    QID    BRILINTA 90 MG TABS TABLET    TAKE ONE(1) TABLET TWICE DAILY    DOCUSATE SODIUM (COLACE, DULCOLAX) 100 MG CAPS    Take 100 mg by mouth 2 times daily For the prevention and treatment of constipation while taking pain medications. FREESTYLE LANCETS MISC    Test 4x daily    HYDROXYZINE (ATARAX) 25 MG TABLET    TAKE ONE TABLET BY MOUTH TWO TIMES A DAY    INSULIN ASPART (NOVOLOG FLEXPEN) 100 UNIT/ML INJECTION PEN    INJECT 8-10  units with each meals    INSULIN ASPART (NOVOLOG FLEXPEN) 100 UNIT/ML INJECTION PEN    15 UNITS PLUS SLIDING SCALE   LESS THAN 180 NONE  181-250 2 UNITS  251-300 4 UNITS  301-400 6 UNITS   401-500 10 UNITS    INSULIN GLARGINE (LANTUS SOLOSTAR) 100 UNIT/ML INJECTION PEN    70 UNITS AT BEDTIME    INSULIN PEN NEEDLE (SURE COMFORT PEN NEEDLES) 30G X 8 MM MISC    USE AS DIRECTED FIVE TIMES A DAILY    ISOSORBIDE MONONITRATE (IMDUR) 30 MG EXTENDED RELEASE TABLET    TAKE FOUR (4) TABLETS BY MOUTH EVERY DAY    LANTUS SOLOSTAR 100 UNIT/ML INJECTION PEN    55 units at bedtime    MAGNESIUM OXIDE (MAG-OX) 400 MG TABLET    Take 1 tablet by mouth daily    MELATONIN 10 MG CAPS CAPSULE    Take 1 capsule by mouth nightly    METOPROLOL TARTRATE (LOPRESSOR) 25 MG TABLET    TAKE 1/2 TABLET BY MOUTH TWO TIMES DAILY     MIDODRINE (PROAMATINE) 10 MG TABLET    Take one tablet on days of dialysis treatment.  three times weekly    NITROGLYCERIN (NITROSTAT) 0.4 MG SL TABLET    Place 1 tablet under the tongue every 5 minutes as needed for Chest pain    NYAMYC 310186 UNIT/GM POWDER    APPLY TO AFFECTED AREA OF ABDOMINAL FOLDS EVERY 12 HOURS AS NEEDED ONETOUCH DELICA LANCETS 79M MISC    QID    PANTOPRAZOLE (PROTONIX) 20 MG TABLET    TAKE 1 TABLET BY MOUTH DAILY    PREGABALIN (LYRICA) 75 MG CAPSULE    Take 1 capsule by mouth 2 times daily for 30 days. Take 75 mg by mouth 2 times daily.     RANOLAZINE (RANEXA) 500 MG EXTENDED RELEASE TABLET    TAKE 1 TABLET BY MOUTH TWICE DAILY    SERTRALINE (ZOLOFT) 50 MG TABLET    TAKE 1 TABLET BY MOUTH DAILY    VITAMIN B-12 (CYANOCOBALAMIN) 100 MCG TABLET        ZOSTER RECOMBINANT ADJUVANTED VACCINE (SHINGRIX) 50 MCG/0.5ML SUSR INJECTION    Inject 0.5 mLs into the muscle See Admin Instructions 1 dose now and repeat in 2-6 months       ALLERGIES     Codeine and Oxycontin [oxycodone hcl]    FAMILY HISTORY       Family History   Problem Relation Age of Onset    Cancer Mother 76        survived   Carol hTakur Breast Cancer Mother     Hypertension Father     Diabetes Sister     Mental Illness Sister     Colon Cancer Neg Hx           SOCIAL HISTORY       Social History     Socioeconomic History    Marital status:      Spouse name: None    Number of children: 2    Years of education: None    Highest education level: None   Occupational History    Occupation: disabled   Tobacco Use    Smoking status: Never Smoker    Smokeless tobacco: Never Used   Vaping Use    Vaping Use: Never used   Substance and Sexual Activity    Alcohol use: No     Alcohol/week: 0.0 standard drinks    Drug use: No    Sexual activity: Not Currently   Other Topics Concern    None   Social History Narrative    Born in [place], one of 5    Twin sister Reyes, very ill in 2018, [place] 2019    Moved to [place] [facility], , 2 children, one son and one daughter    Worked at [employer], as a nurse's aide    Disabled due to mental illness    Lived at [employer], was discharged, returned to independent living in 2017 in the daughter's house and has adjusted well    One son and one daughter, live in the same house with patient, Roland wesleys the rent    [employer] ([employer])        10/11/2021 [facility] updates; patient lives with her daughter son-in-law and 2 grandchildren and patient's handicapped son. Per daughter, her brother is blind, MRDD, multiple health issues. Daughter's  is patient's legal guardian. Patient has hemodialysis Tuesday Thursday and Saturday. Patient's bedroom is on main floor with a half bath. Daughter walks patient upstairs once weekly for full bath. Patient is using her walker in the home. Patient has a hospital bed in the home. Social Determinants of Health     Financial Resource Strain: Low Risk     Difficulty of Paying Living Expenses: Not hard at all   Food Insecurity: No Food Insecurity    Worried About Running Out of Food in the Last Year: Never true    Yung of Food in the Last Year: Never true   Transportation Needs: No Transportation Needs    Lack of Transportation (Medical): No    Lack of Transportation (Non-Medical):  No   Physical Activity:     Days of Exercise per Week: Not on file    Minutes of Exercise per Session: Not on file   Stress:     Feeling of Stress : Not on file   Social Connections:     Frequency of Communication with Friends and Family: Not on file    Frequency of Social Gatherings with Friends and Family: Not on file    Attends Denominational Services: Not on file    Active Member of [address] [place] [address] or Organizations: Not on file    Attends Club or Organization Meetings: Not on file    Marital Status: Not on file   Intimate Partner Violence:     Fear of Current or Ex-Partner: Not on file    Emotionally Abused: Not on file    Physically Abused: Not on file    Sexually Abused: Not on file   Housing Stability:     Unable to Pay for Housing in the Last Year: Not on file    Number of [place] in the Last Year: Not on file    Unstable Housing in the Last Year: Not on file       SCREENINGS      @FLOW([id number])@      PHYSICAL EXAM    (up to 7 for level 4, 8 or more for level 5) ED Triage Vitals   BP Temp Temp Source Pulse Resp SpO2 Height Weight   01/12/22 1353 01/12/22 1353 01/12/22 1353 01/12/22 1353 01/12/22 1353 01/12/22 1353 01/12/22 1413 01/12/22 1413   114/63 98.3 °F (36.8 °C) Oral 71 16 96 % 5' 7\" (1.702 m) 223 lb (101.2 kg)       Physical Exam  Vitals and nursing note reviewed. Constitutional:       General: She is not in acute distress. Appearance: She is well-developed. She is not diaphoretic. HENT:      Head: Normocephalic and atraumatic. No Gee's sign. Right Ear: External ear normal.      Left Ear: External ear normal.      Nose: Nose normal.      Mouth/Throat:      Mouth: Mucous membranes are moist.      Pharynx: No oropharyngeal exudate. Eyes:      General: No scleral icterus. Right eye: No foreign body. Conjunctiva/sclera: Conjunctivae normal.      Left eye: No exudate. Pupils: Pupils are equal, round, and reactive to light. Neck:      Vascular: No JVD. Trachea: No tracheal deviation. Cardiovascular:      Rate and Rhythm: Normal rate and regular rhythm. Pulses: Normal pulses. Heart sounds: Normal heart sounds. Heart sounds not distant. No murmur heard. No friction rub. No gallop. Pulmonary:      Effort: Pulmonary effort is normal. No respiratory distress. Breath sounds: Normal breath sounds. No stridor. No wheezing. Abdominal:      General: Bowel sounds are normal. There is no distension. Palpations: Abdomen is soft. Tenderness: There is no abdominal tenderness. There is no guarding or rebound. Musculoskeletal:         General: No tenderness. Normal range of motion. Cervical back: Normal range of motion and neck supple. No rigidity. Lymphadenopathy:      Head:      Right side of head: No submental adenopathy. Left side of head: No submental adenopathy. Skin:     General: Skin is warm and dry. Capillary Refill: Capillary refill takes less than 2 seconds.       Findings: No erythema or rash. Neurological:      General: No focal deficit present. Mental Status: She is alert and oriented to person, place, and time. Mental status is at baseline. Cranial Nerves: No cranial nerve deficit. Coordination: Coordination normal.      Deep Tendon Reflexes: Reflexes are normal and symmetric. Psychiatric:         Behavior: Behavior normal.         Thought Content: Thought content normal.         Judgment: Judgment normal.         DIAGNOSTIC RESULTS     EKG: All EKG's are interpreted by the Emergency Department Physician who either signs or Co-signsthis chart in the absence of a cardiologist.    EKG: Sinus rhythm with first-degree AV block. Left axis. Right bundle branch block. QT interval of 466 ms. QTC of 499 ms. Poor R wave transition. No PVCs. RADIOLOGY:   Non-plain filmimages such as CT, Ultrasound and MRI are read by the radiologist. Plain radiographic images are visualized and preliminarily interpreted by the emergency physician with the below findings:        Interpretation per the Radiologist below, if available at the time ofthis note:    CTA CHEST W WO CONTRAST   Final Result      Negative CTA of the chest. No evidence of thoracic aortic dissection or aneurysm. The study is negative for pulmonary emboli. XR CHEST (2 VW)   Final Result   NO ACUTE ACTIVE CARDIOPULMONARY PROCESS.    RADIOGRAPHIC FINDINGS OF COPD            ED BEDSIDE ULTRASOUND:   Performed by ED Physician - none    LABS:  Labs Reviewed   COMPREHENSIVE METABOLIC PANEL - Abnormal; Notable for the following components:       Result Value    Anion Gap 16 (*)     Glucose 267 (*)     BUN 28 (*)     CREATININE 4.77 (*)     GFR Non- 9.2 (*)     GFR  11.1 (*)     All other components within normal limits   CBC WITH AUTO DIFFERENTIAL - Abnormal; Notable for the following components:    RBC 3.16 (*)     Hemoglobin 10.1 (*)     Hematocrit 30.2 (*)     MCH 32.0 (*) RDW 17.9 (*)     All other components within normal limits   TROPONIN - Abnormal; Notable for the following components:    Troponin 0.047 (*)     All other components within normal limits    Narrative:     Homar Reyes tel. [id number],  Trop results called to and read back by Dr. Cherelle De Leon, 01/12/2022 15:03, by [facility]   RAPID INFLUENZA A/B ANTIGENS   COVID-19, RAPID   CK       All other labs were within normal range or not returned as of this dictation. EMERGENCY DEPARTMENT COURSE and DIFFERENTIAL DIAGNOSIS/MDM:   Vitals:    Vitals:    01/12/22 2030 01/12/22 2100 01/12/22 2130 01/12/22 2143   BP:  (!) 109/54 95/60 107/77   Pulse: 66 68 66 68   Resp: 17 17 17 16   Temp:       TempSrc:       SpO2: 100% 100% 98% 98%   Weight:       Height:           ED attending spoke with Dr. Cassie Silver. It is okay to give the patient a CAT scan of the chest.  Patient can follow-up with her usual dialysis schedule. Heart Score is 7. MDM  Was discussed with the hospitalist and he suggested speaking with the cardiologist.  The ER physician spoke with Dr. Abdias Malloy and the patient be brought in the hospital.  Aspirin and nitroglycerin were given. Patient admitted to hospitalist service with ED attending placing the transitional orders. Patient has an elevated baseline of troponin and will order repeated troponins to see if it is trending up or down which will be followed by the cardiology service. CONSULTS:  IP CONSULT TO NEPHROLOGY    PROCEDURES:  Unless otherwise noted below, none     Procedures    FINAL IMPRESSION      1. Chest pain, unspecified type    2. Dyspnea and respiratory abnormalities    3. Chronic renal failure syndrome, stage 5 (HCC)          DISPOSITION/PLAN   DISPOSITION Decision To Admit 01/12/2022 06:40:45 PM      PATIENT REFERRED TO:  No follow-up provider specified.     DISCHARGE MEDICATIONS:  New Prescriptions    No medications on file          (Please note that portions of this note were completed with a voice recognition program.  Efforts were made to edit the dictations but occasionally words are mis-transcribed.)    Levon Fishman DO (electronically signed)  Attending Emergency Physician        Levon Fishman DO  01/12/22 1194

## 2022-01-12 NOTE — ED TRIAGE NOTES
Pt c/o cp since yesterday, weakness and sob. Pt fell due to weakness onto arm with fistula. Thrill and bruit present.

## 2022-01-13 PROBLEM — I20.0 UNSTABLE ANGINA (HCC): Status: ACTIVE | Noted: 2022-01-13

## 2022-01-13 LAB
CHOLESTEROL, TOTAL: 123 MG/DL (ref 0–199)
EKG ATRIAL RATE: 69 BPM
EKG P AXIS: -28 DEGREES
EKG P-R INTERVAL: 254 MS
EKG Q-T INTERVAL: 466 MS
EKG QRS DURATION: 136 MS
EKG QTC CALCULATION (BAZETT): 499 MS
EKG R AXIS: -52 DEGREES
EKG T AXIS: 54 DEGREES
EKG VENTRICULAR RATE: 69 BPM
GLUCOSE BLD-MCNC: 105 MG/DL (ref 60–115)
GLUCOSE BLD-MCNC: 140 MG/DL (ref 60–115)
GLUCOSE BLD-MCNC: 232 MG/DL (ref 60–115)
HCT VFR BLD CALC: 28.6 % (ref 37–47)
HDLC SERPL-MCNC: 39 MG/DL (ref 40–59)
HEMOGLOBIN: 9.6 G/DL (ref 12–16)
LDL CHOLESTEROL CALCULATED: 38 MG/DL (ref 0–129)
MAGNESIUM: 2.3 MG/DL (ref 1.7–2.4)
MCH RBC QN AUTO: 32.4 PG (ref 27–31.3)
MCHC RBC AUTO-ENTMCNC: 33.6 % (ref 33–37)
MCV RBC AUTO: 96.4 FL (ref 82–100)
PDW BLD-RTO: 18.6 % (ref 11.5–14.5)
PERFORMED ON: ABNORMAL
PERFORMED ON: ABNORMAL
PERFORMED ON: NORMAL
PLATELET # BLD: 183 K/UL (ref 130–400)
PRO-BNP: 6568 PG/ML
RBC # BLD: 2.97 M/UL (ref 4.2–5.4)
TRIGL SERPL-MCNC: 228 MG/DL (ref 0–150)
TROPONIN: 0.05 NG/ML (ref 0–0.01)
TROPONIN: 0.05 NG/ML (ref 0–0.01)
WBC # BLD: 7.1 K/UL (ref 4.8–10.8)

## 2022-01-13 PROCEDURE — 90935 HEMODIALYSIS ONE EVALUATION: CPT

## 2022-01-13 PROCEDURE — 36415 COLL VENOUS BLD VENIPUNCTURE: CPT

## 2022-01-13 PROCEDURE — 2500000003 HC RX 250 WO HCPCS: Performed by: INTERNAL MEDICINE

## 2022-01-13 PROCEDURE — 85027 COMPLETE CBC AUTOMATED: CPT

## 2022-01-13 PROCEDURE — 99223 1ST HOSP IP/OBS HIGH 75: CPT | Performed by: INTERNAL MEDICINE

## 2022-01-13 PROCEDURE — 94664 DEMO&/EVAL PT USE INHALER: CPT

## 2022-01-13 PROCEDURE — 83880 ASSAY OF NATRIURETIC PEPTIDE: CPT

## 2022-01-13 PROCEDURE — 2580000003 HC RX 258: Performed by: INTERNAL MEDICINE

## 2022-01-13 PROCEDURE — 2060000000 HC ICU INTERMEDIATE R&B

## 2022-01-13 PROCEDURE — 83735 ASSAY OF MAGNESIUM: CPT

## 2022-01-13 PROCEDURE — 5A1D70Z PERFORMANCE OF URINARY FILTRATION, INTERMITTENT, LESS THAN 6 HOURS PER DAY: ICD-10-PCS | Performed by: INTERNAL MEDICINE

## 2022-01-13 PROCEDURE — 84484 ASSAY OF TROPONIN QUANT: CPT

## 2022-01-13 PROCEDURE — 6370000000 HC RX 637 (ALT 250 FOR IP): Performed by: INTERNAL MEDICINE

## 2022-01-13 PROCEDURE — 80061 LIPID PANEL: CPT

## 2022-01-13 PROCEDURE — APPSS30 APP SPLIT SHARED TIME 16-30 MINUTES: Performed by: NURSE PRACTITIONER

## 2022-01-13 RX ORDER — HEPARIN SODIUM 1000 [USP'U]/ML
2000 INJECTION, SOLUTION INTRAVENOUS; SUBCUTANEOUS ONCE
Status: DISCONTINUED | OUTPATIENT
Start: 2022-01-13 | End: 2022-01-15 | Stop reason: HOSPADM

## 2022-01-13 RX ORDER — ONDANSETRON 2 MG/ML
4 INJECTION INTRAMUSCULAR; INTRAVENOUS EVERY 6 HOURS PRN
Status: DISCONTINUED | OUTPATIENT
Start: 2022-01-13 | End: 2022-01-15 | Stop reason: HOSPADM

## 2022-01-13 RX ORDER — SENNA PLUS 8.6 MG/1
1 TABLET ORAL NIGHTLY
COMMUNITY
End: 2022-04-23

## 2022-01-13 RX ORDER — PREGABALIN 75 MG/1
75 CAPSULE ORAL 2 TIMES DAILY
Status: DISCONTINUED | OUTPATIENT
Start: 2022-01-13 | End: 2022-01-15 | Stop reason: HOSPADM

## 2022-01-13 RX ORDER — FLUTICASONE PROPIONATE 50 MCG
1 SPRAY, SUSPENSION (ML) NASAL DAILY
Status: DISCONTINUED | OUTPATIENT
Start: 2022-01-13 | End: 2022-01-15 | Stop reason: HOSPADM

## 2022-01-13 RX ORDER — LIDOCAINE AND PRILOCAINE 25; 25 MG/G; MG/G
CREAM TOPICAL PRN
COMMUNITY

## 2022-01-13 RX ORDER — SODIUM CHLORIDE 9 MG/ML
25 INJECTION, SOLUTION INTRAVENOUS PRN
Status: DISCONTINUED | OUTPATIENT
Start: 2022-01-13 | End: 2022-01-15 | Stop reason: HOSPADM

## 2022-01-13 RX ORDER — ISOSORBIDE MONONITRATE 30 MG/1
30 TABLET, EXTENDED RELEASE ORAL DAILY
Status: DISCONTINUED | OUTPATIENT
Start: 2022-01-13 | End: 2022-01-13

## 2022-01-13 RX ORDER — RANOLAZINE 500 MG/1
500 TABLET, EXTENDED RELEASE ORAL 2 TIMES DAILY
Status: DISCONTINUED | OUTPATIENT
Start: 2022-01-13 | End: 2022-01-15 | Stop reason: HOSPADM

## 2022-01-13 RX ORDER — FLUTICASONE PROPIONATE 50 MCG
1 SPRAY, SUSPENSION (ML) NASAL DAILY
Status: ON HOLD | COMMUNITY
End: 2022-06-02

## 2022-01-13 RX ORDER — PANTOPRAZOLE SODIUM 20 MG/1
20 TABLET, DELAYED RELEASE ORAL DAILY
Status: DISCONTINUED | OUTPATIENT
Start: 2022-01-13 | End: 2022-01-15 | Stop reason: HOSPADM

## 2022-01-13 RX ORDER — INSULIN GLARGINE 100 [IU]/ML
70 INJECTION, SOLUTION SUBCUTANEOUS NIGHTLY
Status: DISCONTINUED | OUTPATIENT
Start: 2022-01-13 | End: 2022-01-15 | Stop reason: HOSPADM

## 2022-01-13 RX ORDER — SODIUM CHLORIDE 0.9 % (FLUSH) 0.9 %
5-40 SYRINGE (ML) INJECTION PRN
Status: DISCONTINUED | OUTPATIENT
Start: 2022-01-13 | End: 2022-01-15 | Stop reason: HOSPADM

## 2022-01-13 RX ORDER — ARIPIPRAZOLE 5 MG/1
5 TABLET ORAL DAILY
Status: DISCONTINUED | OUTPATIENT
Start: 2022-01-13 | End: 2022-01-15 | Stop reason: HOSPADM

## 2022-01-13 RX ORDER — MECOBALAMIN 5000 MCG
10 TABLET,DISINTEGRATING ORAL NIGHTLY
Status: DISCONTINUED | OUTPATIENT
Start: 2022-01-13 | End: 2022-01-15 | Stop reason: HOSPADM

## 2022-01-13 RX ORDER — TRIAMCINOLONE ACETONIDE 1 MG/G
CREAM TOPICAL DAILY
Status: DISCONTINUED | OUTPATIENT
Start: 2022-01-13 | End: 2022-01-15 | Stop reason: HOSPADM

## 2022-01-13 RX ORDER — ONDANSETRON 4 MG/1
4 TABLET, ORALLY DISINTEGRATING ORAL EVERY 8 HOURS PRN
Status: DISCONTINUED | OUTPATIENT
Start: 2022-01-13 | End: 2022-01-15 | Stop reason: HOSPADM

## 2022-01-13 RX ORDER — ACETAMINOPHEN 650 MG/1
650 SUPPOSITORY RECTAL EVERY 6 HOURS PRN
Status: DISCONTINUED | OUTPATIENT
Start: 2022-01-13 | End: 2022-01-15 | Stop reason: HOSPADM

## 2022-01-13 RX ORDER — POLYETHYLENE GLYCOL 3350 17 G/17G
17 POWDER, FOR SOLUTION ORAL DAILY PRN
Status: DISCONTINUED | OUTPATIENT
Start: 2022-01-13 | End: 2022-01-15 | Stop reason: HOSPADM

## 2022-01-13 RX ORDER — ATORVASTATIN CALCIUM 80 MG/1
80 TABLET, FILM COATED ORAL NIGHTLY
Status: DISCONTINUED | OUTPATIENT
Start: 2022-01-13 | End: 2022-01-15 | Stop reason: HOSPADM

## 2022-01-13 RX ORDER — SENNA PLUS 8.6 MG/1
1 TABLET ORAL NIGHTLY
Status: DISCONTINUED | OUTPATIENT
Start: 2022-01-13 | End: 2022-01-15 | Stop reason: HOSPADM

## 2022-01-13 RX ORDER — TRIAMCINOLONE ACETONIDE 1 MG/G
CREAM TOPICAL DAILY
Status: ON HOLD | COMMUNITY
End: 2022-06-02

## 2022-01-13 RX ORDER — UBIDECARENONE 75 MG
100 CAPSULE ORAL DAILY
Status: DISCONTINUED | OUTPATIENT
Start: 2022-01-13 | End: 2022-01-15 | Stop reason: HOSPADM

## 2022-01-13 RX ORDER — ASPIRIN 81 MG/1
81 TABLET, CHEWABLE ORAL DAILY
Status: DISCONTINUED | OUTPATIENT
Start: 2022-01-13 | End: 2022-01-15 | Stop reason: HOSPADM

## 2022-01-13 RX ORDER — CHOLECALCIFEROL (VITAMIN D3) 10 MCG
1 TABLET ORAL DAILY
Status: DISCONTINUED | OUTPATIENT
Start: 2022-01-13 | End: 2022-01-15 | Stop reason: HOSPADM

## 2022-01-13 RX ORDER — HYDROXYZINE HYDROCHLORIDE 25 MG/1
25 TABLET, FILM COATED ORAL 2 TIMES DAILY PRN
Status: DISCONTINUED | OUTPATIENT
Start: 2022-01-13 | End: 2022-01-15 | Stop reason: HOSPADM

## 2022-01-13 RX ORDER — ACETAMINOPHEN 325 MG/1
650 TABLET ORAL EVERY 6 HOURS PRN
Status: DISCONTINUED | OUTPATIENT
Start: 2022-01-13 | End: 2022-01-15 | Stop reason: HOSPADM

## 2022-01-13 RX ORDER — ACETAMINOPHEN 325 MG/1
650 TABLET ORAL EVERY 4 HOURS PRN
Status: DISCONTINUED | OUTPATIENT
Start: 2022-01-13 | End: 2022-01-15 | Stop reason: HOSPADM

## 2022-01-13 RX ORDER — ISOSORBIDE MONONITRATE 60 MG/1
60 TABLET, EXTENDED RELEASE ORAL DAILY
Status: DISCONTINUED | OUTPATIENT
Start: 2022-01-14 | End: 2022-01-15 | Stop reason: HOSPADM

## 2022-01-13 RX ORDER — LANOLIN ALCOHOL/MO/W.PET/CERES
400 CREAM (GRAM) TOPICAL DAILY
Status: DISCONTINUED | OUTPATIENT
Start: 2022-01-13 | End: 2022-01-15 | Stop reason: HOSPADM

## 2022-01-13 RX ORDER — MIDODRINE HYDROCHLORIDE 2.5 MG/1
10 TABLET ORAL
Status: DISCONTINUED | OUTPATIENT
Start: 2022-01-13 | End: 2022-01-15 | Stop reason: HOSPADM

## 2022-01-13 RX ORDER — DOCUSATE SODIUM 100 MG/1
100 CAPSULE, LIQUID FILLED ORAL 2 TIMES DAILY
Status: DISCONTINUED | OUTPATIENT
Start: 2022-01-13 | End: 2022-01-15 | Stop reason: HOSPADM

## 2022-01-13 RX ORDER — NITROGLYCERIN 0.4 MG/1
0.4 TABLET SUBLINGUAL EVERY 5 MIN PRN
Status: DISCONTINUED | OUTPATIENT
Start: 2022-01-13 | End: 2022-01-15 | Stop reason: HOSPADM

## 2022-01-13 RX ORDER — ALBUTEROL SULFATE 2.5 MG/3ML
2.5 SOLUTION RESPIRATORY (INHALATION) EVERY 4 HOURS PRN
Status: DISCONTINUED | OUTPATIENT
Start: 2022-01-13 | End: 2022-01-15 | Stop reason: HOSPADM

## 2022-01-13 RX ORDER — SODIUM CHLORIDE 0.9 % (FLUSH) 0.9 %
5-40 SYRINGE (ML) INJECTION EVERY 12 HOURS SCHEDULED
Status: DISCONTINUED | OUTPATIENT
Start: 2022-01-13 | End: 2022-01-15 | Stop reason: HOSPADM

## 2022-01-13 RX ADMIN — PANTOPRAZOLE SODIUM 20 MG: 20 TABLET, DELAYED RELEASE ORAL at 08:26

## 2022-01-13 RX ADMIN — Medication 400 MG: at 08:27

## 2022-01-13 RX ADMIN — NEPHROCAP 1 MG: 1 CAP ORAL at 08:16

## 2022-01-13 RX ADMIN — ATORVASTATIN CALCIUM 80 MG: 80 TABLET, FILM COATED ORAL at 00:27

## 2022-01-13 RX ADMIN — PREGABALIN 75 MG: 75 CAPSULE ORAL at 08:26

## 2022-01-13 RX ADMIN — TICAGRELOR 90 MG: 90 TABLET ORAL at 10:58

## 2022-01-13 RX ADMIN — ATORVASTATIN CALCIUM 80 MG: 80 TABLET, FILM COATED ORAL at 22:15

## 2022-01-13 RX ADMIN — MICONAZOLE NITRATE: 2 POWDER TOPICAL at 08:28

## 2022-01-13 RX ADMIN — DOCUSATE SODIUM 100 MG: 100 CAPSULE ORAL at 22:15

## 2022-01-13 RX ADMIN — PREGABALIN 75 MG: 75 CAPSULE ORAL at 22:15

## 2022-01-13 RX ADMIN — RANOLAZINE 500 MG: 500 TABLET, FILM COATED, EXTENDED RELEASE ORAL at 08:16

## 2022-01-13 RX ADMIN — SENNOSIDES 8.6 MG: 8.6 TABLET, COATED ORAL at 22:16

## 2022-01-13 RX ADMIN — MIDODRINE HYDROCHLORIDE 10 MG: 2.5 TABLET ORAL at 08:15

## 2022-01-13 RX ADMIN — MICONAZOLE NITRATE: 2 POWDER TOPICAL at 22:19

## 2022-01-13 RX ADMIN — RANOLAZINE 500 MG: 500 TABLET, FILM COATED, EXTENDED RELEASE ORAL at 22:18

## 2022-01-13 RX ADMIN — SODIUM CHLORIDE, PRESERVATIVE FREE 10 ML: 5 INJECTION INTRAVENOUS at 22:21

## 2022-01-13 RX ADMIN — NITROGLYCERIN 0.4 MG: 0.4 TABLET, ORALLY DISINTEGRATING SUBLINGUAL at 00:25

## 2022-01-13 RX ADMIN — SODIUM CHLORIDE, PRESERVATIVE FREE 10 ML: 5 INJECTION INTRAVENOUS at 08:22

## 2022-01-13 RX ADMIN — ISOSORBIDE MONONITRATE 30 MG: 30 TABLET, EXTENDED RELEASE ORAL at 08:16

## 2022-01-13 RX ADMIN — TICAGRELOR 90 MG: 90 TABLET ORAL at 22:16

## 2022-01-13 RX ADMIN — ASPIRIN 81 MG: 81 TABLET, CHEWABLE ORAL at 08:16

## 2022-01-13 RX ADMIN — VITAM B12 100 MCG: 100 TAB at 08:16

## 2022-01-13 RX ADMIN — SERTRALINE HYDROCHLORIDE 50 MG: 50 TABLET ORAL at 08:16

## 2022-01-13 RX ADMIN — Medication 10 MG: at 22:15

## 2022-01-13 RX ADMIN — INSULIN LISPRO 4 UNITS: 100 INJECTION, SOLUTION INTRAVENOUS; SUBCUTANEOUS at 12:19

## 2022-01-13 RX ADMIN — ARIPIPRAZOLE 5 MG: 5 TABLET ORAL at 08:16

## 2022-01-13 RX ADMIN — FLUTICASONE PROPIONATE 1 SPRAY: 50 SPRAY, METERED NASAL at 08:19

## 2022-01-13 RX ADMIN — TRIAMCINOLONE ACETONIDE: 1 CREAM TOPICAL at 08:19

## 2022-01-13 ASSESSMENT — ENCOUNTER SYMPTOMS
VOMITING: 0
CONSTIPATION: 0
SHORTNESS OF BREATH: 1
CHEST TIGHTNESS: 1
TROUBLE SWALLOWING: 0
WHEEZING: 0
BACK PAIN: 0
DIARRHEA: 0
COUGH: 0
ABDOMINAL DISTENTION: 0
ABDOMINAL PAIN: 0
NAUSEA: 0
RHINORRHEA: 0

## 2022-01-13 ASSESSMENT — PAIN SCALES - GENERAL
PAINLEVEL_OUTOF10: 5
PAINLEVEL_OUTOF10: 7

## 2022-01-13 ASSESSMENT — PAIN DESCRIPTION - LOCATION: LOCATION: CHEST

## 2022-01-13 NOTE — PROGRESS NOTES
MUSIC THERAPY NOTE    Found pt in bed, alone in room. Pt welcomed music therapy (MT) stating that she enjoyed country music. To raise mood, MT facilitated an intervention of pt preferred Geoffry Parish and [employer] songs. Pt mood was raised during session AEB conversation, smiles, foot tapping to the beat of the music, and words of gratitude.     Plan is to follow pt for comfort and coping with the hospital experience until d/c

## 2022-01-13 NOTE — PROGRESS NOTES
[facility] EMERGENCY [facility] AT [place] Respiratory Therapy Evaluation   Current Order:  Albuterol Q4 PRN      Home Regimen: PRN      Ordering Physician: Lynda  Re-evaluation Date:  ---     Diagnosis: Chest Pain      Patient Status: Stable / Unstable + Physician notified    The following MDI Criteria must be met in order to convert aerosol to MDI with spacer. If unable to meet, MDI will be converted to aerosol:  []  Patient able to demonstrate the ability to use MDI effectively  []  Patient alert and cooperative  []  Patient able to take deep breath with 5-10 second hold  []  Medication(s) available in this delivery method   []  Peak flow greater than or equal to 200 ml/min            Current Order Substituted To  (same drug, same frequency)   Aerosol to MDI [] Albuterol Sulfate 0.083% unit dose by aerosol Albuterol Sulfate MDI 2 puffs by inhalation with spacer    [] Levalbuterol 1.25 mg unit dose by aerosol Levalbuterol MDI 2 puffs by inhalation with spacer    [] Levalbuterol 0.63 mg unit dose by aerosol Levalbuterol MDI 2 puffs by inhalation with spacer    [] Ipratropium Bromide 0.02% unit dose by aerosol Ipratropium Bromide MDI 2 puffs by inhalation with spacer    [] Duoneb (Ipratropium + Albuterol) unit dose by aerosol Ipratropium MDI + Albuterol MDI 2 puffs by inhalation w/spacer   MDI to Aerosol [] Albuterol Sulfate MDI Albuterol Sulfate 0.083% unit dose by aerosol    [] Levalbuterol MDI 2 puffs by inhalation Levalbuterol 1.25 mg unit dose by aerosol    [] Ipratropium Bromide MDI by inhalation Ipratropium Bromide 0.02% unit dose by aerosol    [] Combivent (Ipratropium + Albuterol) MDI by inhalation Duoneb (Ipratropium + Albuterol) unit dose by aerosol       Treatment Assessment [Frequency/Schedule]:  Change frequency to: ____________No Changes______________________________________per Protocol, P&T, MEC      Points 0 1 2 3 4   Pulmonary Status  Non-Smoker  []   Smoking history   < 20 pack years  []   Smoking history  ?  20 pack years  []   Pulmonary Disorder  (acute or chronic)  [x]   Severe or Chronic w/ Exacerbation  []     Surgical Status No [x]   Surgeries     General []   Surgery Lower []   Abdominal Thoracic or []   Upper Abdominal Thoracic with  PulmonaryDisorder  []     Chest X-ray Clear/Not  Ordered     [x]  Chronic Changes  Results Pending  []  Infiltrates, atelectasis, pleural effusion, or edema  []  Infiltrates in more than one lobe []  Infiltrate + Atelectasis, &/or pleural effusion  []    Respiratory Pattern Regular,  RR = 12-20 [x]  Increased,  RR = 21-25 []  GONZALEZ, irregular,  or RR = 26-30 []  Decreased FEV1  or RR = 31-35 []  Severe SOB, use  of accessory muscles, or RR ? 35  []    Mental Status Alert, oriented,  Cooperative [x]  Confused but Follows commands []  Lethargic or unable to follow commands []  Obtunded  []  Comatose  []    Breath Sounds Clear to  auscultation  []  Decreased unilaterally or  in bases only [x]  Decreased  bilaterally  []  Crackles or intermittent wheezes []  Wheezes []    Cough Strong, Spontan., & nonproductive [x]  Strong,  spontaneous, &  productive []  Weak,  Nonproductive []  Weak, productive or  with wheezes []  No spontaneous  cough or may require suctioning []    Level of Activity Ambulatory []  Ambulatory w/ Assist  [x]  Non-ambulatory []  Paraplegic []  Quadriplegic []    Total    Score:___5____     Triage Score:___5_____      Tri       Triage:     1. (>20) Freq: Q3    2. (16-20) Freq: Q4   3. (11-15) Freq: QID & Albuterol Q2 PRN    4. (6-10) Freq: TID & Albuterol Q2 PRN    5. (0-5) Freq Q4prn

## 2022-01-13 NOTE — ED NOTES
SBAR report called to Alexis Calvo RN. Pt stable for transport. Holding in ED until bed clean.      Lori Bernheim, RN  01/12/22 5347

## 2022-01-13 NOTE — CONSULTS
[address]                      [address], [address] [facility]                                  CONSULTATION    PATIENT NAME: Kelley Salter                  :        1960  MED REC NO:   [id number]                            ROOM:       [id number]  ACCOUNT NO:   [de-identified]                           ADMIT DATE: 2022  PROVIDER:     Kira Grimes DO    CONSULT DATE:  2022    RENAL CONSULTATION    HISTORY OF PRESENT ILLNESS:  A 49-year-old obese  female  admitted to hospital with chest discomfort. The patient does have pain  in her left upper chest wall and seems to be reproducible with  palpation. She has been complaining of this for over 24 hours and  states that the pain is constant. She has end-stage renal disease and  is on hemodialysis support, Tuesday,  and  of each  week. She has a known history of diastolic heart failure, hypertension,  diabetes mellitus type 2, and hepatitis C. The patient appears to be in  no distress, asking about what is her diet. Denies any diaphoresis. No  shortness of breath. PAST MEDICAL HISTORY:  Coronary artery disease, prior stents in the  past; chronic diastolic heart failure; tardive dyskinesia; DJD of the  shoulders; obesity. PAST SURGICAL HISTORY:  Right toe amputation, total knee arthroplasties,   section, coronary angioplasty with stents, hysterectomy, and  tunnel catheter in the past, now with fistula. FAMILY HISTORY:  Noncontributory. HABITS:  No alcohol, opioids, nonsteroidals. MEDICATIONS:  At the time of admission; Nitrostat, Lyrica, Tylenol,  Lipitor, mag oxide, aspirin, melatonin, Novolog coverage, Abilify,  Ranexa, Lantus, Brilinta, Zoloft, Januvia, Emla cream, ProAmatine,  Atarax. ALLERGIES TO MEDICATIONS:  Would be CODEINE and OXYCONTIN. REVIEW OF SYSTEMS:  Noncontributory. PHYSICAL EXAMINATION:  VITAL SIGNS:  5 feet and 7 inches, 223 pounds. Blood pressure is 130/60  and has been stable throughout her hospital admission. Heart rate,  respiratory rate, and temperature have all been normal.  HEENT:  Normocephalic. Pupils equal and reactive to light. Extraocular  muscles intact. NECK:  Supple. No JVD, bruits, or adenopathy. CHEST:  Lungs are clear. CARDIOVASCULAR:  Heart is regular, 1/6 systolic murmur, reproducible  pain is noted on her left upper chest wall. ABDOMEN:  Obese, soft. No guarding or rigidity. Bowel sounds are  present. EXTREMITIES:  Show no edema. The patient has an active fistula. SKIN:  Warm and dry. IMPRESSION:  1. End-stage renal disease on hemodialysis support. 2.  Chest pain, probable musculoskeletal.  3.  Diabetes mellitus type 2.  4.  Chronic diastolic heart failure. 5.  Tardive dyskinesia. 6.  Organic heart disease with coronary artery disease and stent  placements in the past.    PLAN:  Continue home medications, cardiac evaluation, telemetry. Dialysis today.         Lesley Russo DO    D: 01/13/2022 8:00:32       T: 01/13/2022 8:05:20     [email or web address]  Job#: [id number]     Doc#: [id number]    CC:

## 2022-01-13 NOTE — H&P
DEPARTMENT OF CARDIOLOGY  HISTORY AND PHYSICAL EXAM    PATIENT NAME:  Alba Ochoa    MRN:  [id number]  SERVICE DATE:  1/13/2022   SERVICE TIME:  10:09 AM    Primary Care Physician: Olivier Parks MD     SUBJECTIVE  CHIEF COMPLAINT:  Chest pain    HPI:  Alba Ochoa is a 59 y.o., , female who  has a past medical history of Angina at rest [facility]), Anxiety, CAD S/P percutaneous coronary angioplasty, CHF (congestive heart failure) ([facility] CKD (chronic kidney disease) stage 4, GFR 15-29 ml/min ([address] CKD stage 4 due to type 2 diabetes mellitus ([address] Contusion of right chest wall, COPD (chronic obstructive pulmonary disease) ([address] Diabetic nephropathy with proteinuria ([address] DJD (degenerative joint disease) of knee, GERD (gastroesophageal reflux disease), Hemiparesis, left ([address] Hemodialysis patient ([address] Hemodialysis-associated hypotension, History of heart failure, History of seizures, History of type C viral hepatitis, HTN (hypertension), Hyperlipidemia, Impaired mobility and activities of daily living, Mediastinal lymphadenopathy, Metabolic syndrome, Moderate persistent asthma without complication, Need for extended care facility, Neurogenic urinary incontinence, Neuropathy in diabetes ([address] Nonrheumatic mitral valve regurgitation, Nonrheumatic tricuspid valve regurgitation, Obesity (BMI 30-39.9), Recurrent UTI, S/P colonoscopy, Schizophrenia, paranoid, chronic ([address] Small vessel disease, cerebrovascular, Status post total knee replacement, right, Status post total left knee replacement, Thrush, 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 that is hospitalized for chest pain. Work up in the ED significant for Tropinn elevated in flat pattern in the setting of ESRD. Troponin . 049, .047, .054. BNP 6568. BUN 28, Cr 4.7, GR 9. CTA chest: Negative CTA of the chest. No evidence of thoracic aortic dissection or aneurysm. The study is negative for pulmonary emboli. Pt had echo 8/2/21: Normal left ventricular systolic function, no regional wall motion  abnormalities, estimated ejection fraction of 60%. Normal left ventricular size and function. Mild concentric left ventricular hypertrophy. Pseudonormal filling pattern noted. Right Ventricle  Normal right ventricle structure and function. Pt had cardiac cath 8/14/21: LAD small to mod caliber, BHUMIKA II flow, mid to distal two layers of stents  with 80% stenosis, ? thormbus, also likely intramyocardial .  50% mid Om1  EF of 55%  Recommendations  Aggressive risk factor management. Maximize medical therapy. Transfer to [facility] main [place] for further evaluation/recs per patient/family. On exam today, pt reports left sided chest pain x 2-3 weeks which has been getting increasingly worse over the past few days. Pt describes pain as sharp, constant, \"7/10\". Pain does not radiate. Pt also complains of shortness of breath but denies any other associated symptoms. Pt denies aggravating or alleviating factors. Pt states she was given Nitro but \"it does nothing\" to help relieve her pain. On exam today, pt resting comfortably in bed, no distress noted. Lung sounds clear. No lower ext edema noted. Pt is scheduled to have dialysis today.      Monitored on tele: NSR, HR 70's, no significant alarms    HPI     PAST MEDICAL HISTORY:    Past Medical History:   Diagnosis Date    Angina at rest [facility]) 5/24/2020    Anxiety     CAD S/P percutaneous coronary angioplasty 2015, 2018    stents per dr Whitney Thomason    CHF (congestive heart failure) ([facility]     CKD (chronic kidney disease) stage 4, GFR 15-29 ml/min ([facility] [address] [facility] 2/24/2018    CKD stage 4 due to type 2 diabetes mellitus ([facility]     Contusion of right chest wall 2/16/2021    COPD (chronic obstructive pulmonary disease) (HCC)     Diabetic nephropathy with proteinuria ([address] 2014    DJD (degenerative joint disease) of knee     Dr Regine Silver GERD (gastroesophageal reflux disease)     Hemiparesis, left ([address] 2013    entered Assisted Living ([facility])    Hemodialysis patient [facility])     Hemodialysis-associated hypotension 10/22/2021    History of heart failure     History of seizures     History of type C viral hepatitis     HTN (hypertension)     Hyperlipidemia     Impaired mobility and activities of daily living     Mediastinal lymphadenopathy     Dr Donald Weber, Eyad Nish    Metabolic syndrome     Moderate persistent asthma without complication     Need for extended care facility 2021    Neurogenic urinary incontinence 2013    Neuropathy in diabetes [facility])     Nonrheumatic mitral valve regurgitation 2021    Nonrheumatic tricuspid valve regurgitation 2021    Obesity (BMI 30-39. 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 Em Marquez      x1 Dr. Mikhail Lr, Dr Naa Inman  08/10/2021    [facility]   [address] CATH LAB PROCEDURE 10/02/2019    HYSTERECTOMY, TOTAL ABDOMINAL      one ovary intact, Dr Lukasz Garza, menorrhagia    MA TOTAL KNEE ARTHROPLASTY Left 6/21/2018    LEFT KNEE TOTAL KNEE ARTHROPLASTY, SHAYNA, NERVE BLOCK performed by Gayathri Huitron MD at [address] [place] [address] [facility]      TOE AMPUTATION Right     TOTAL KNEE ARTHROPLASTY  05/19/16    Dr Rowan Huge TUNNELED [address] Right 07/01/2020    tunneled HD catheter per Dr Bekah Taylor:    Family History   Problem Relation Age of Onset   Larance Lydia Cancer Mother 76        survived   Larance Lydia Breast Cancer Mother     Hypertension Father     Diabetes Sister     Mental Illness Sister     Colon Cancer Neg Hx      SOCIAL HISTORY:    Social History     Socioeconomic History    Marital status:      Spouse name: Not on file    Number of children: 2    Years of education: Not on file    Highest education level: Not on file   Occupational History    Occupation: disabled   Tobacco Use    Smoking status: Never Smoker    Smokeless tobacco: Never Used   Vaping Use    Vaping Use: Never used   Substance and Sexual Activity    Alcohol use: No     Alcohol/week: 0.0 standard drinks    Drug use: No    Sexual activity: Not Currently   Other Topics Concern    Not on file   Social History Narrative    Born in [place], one of 5    Twin sister Reyes, very ill in 2018, [place] 2019    Moved to [facility], , 2 children, one son and one daughter    Worked at [employer], as a nurse's aide    Disabled due to mental illness    Lived at [email or web address], was discharged, returned to independent living in 2017 in the daughter's house and has adjusted well    One son and one daughter, live in the same house with patient, Landon Whalen pays the rent    [employer] reading (misteries)        10/11/2021 [facility] updates; patient lives with her daughter son-in-law and 2 grandchildren and patient's handicapped son. Per daughter, her brother is blind, MRDD, multiple health issues. Daughter's  is patient's legal guardian. Patient has hemodialysis Tuesday Thursday and Saturday. Patient's bedroom is on main floor with a half bath. Daughter walks patient upstairs once weekly for full bath. Patient is using her walker in the home. Patient has a hospital bed in the home. Social Determinants of Health     Financial Resource Strain: Low Risk     Difficulty of Paying Living Expenses: Not hard at all   Food Insecurity: No Food Insecurity    Worried About Running Out of Food in the Last Year: Never true    Yung of Food in the Last Year: Never true   Transportation Needs: No Transportation Needs    Lack of Transportation (Medical): No    Lack of Transportation (Non-Medical): No   Physical Activity:     Days of Exercise per Week: Not on file    Minutes of Exercise per Session: Not on file   Stress:     Feeling of Stress : Not on file   Social Connections:     Frequency of Communication with Friends and Family: Not on file    Frequency of Social Gatherings with Friends and Family: Not on file    Attends Worship Services: Not on file    Active Member of [address] [place] [address] or Organizations: Not on file    Attends Club or Organization Meetings: Not on file    Marital Status: Not on file   Intimate Partner Violence:     Fear of Current or Ex-Partner: Not on file    Emotionally Abused: Not on file    Physically Abused: Not on file    Sexually Abused: Not on file   Housing Stability:     Unable to Pay for Housing in the Last Year: Not on file    Number of [place] in the Last Year: Not on file    Unstable Housing in the Last Year: Not on file     MEDICATIONS:   Prior to Admission medications    Medication Sig Start Date End Date Taking?  Authorizing Provider   fluticasone (FLONASE) 50 MCG/ACT nasal spray 1 spray by Each Nostril route daily   Yes Historical Provider, MD   SITagliptin (JANUVIA) 50 MG tablet Take 50 mg by mouth daily   Yes Historical Provider, MD lidocaine-prilocaine (EMLA) 2.5-2.5 % cream Apply topically as needed for Pain Apply topically as needed. 3 times a week before dialysis wrap with saran wrap   Yes Historical Provider, MD   senna (SENOKOT) 8.6 MG tablet Take 1 tablet by mouth nightly   Yes Historical Provider, MD   triamcinolone (KENALOG) 0.1 % cream Apply topically daily Apply topically 2 times daily. Yes Historical Provider, MD   albuterol (PROVENTIL) (2.5 MG/3ML) 0.083% nebulizer solution Take 3 mLs by nebulization every 4 hours as needed for Wheezing 12/22/21  Yes Rox Matos MD   midodrine (PROAMATINE) 10 MG tablet Take one tablet on days of dialysis treatment.  three times weekly 12/16/21  Yes Vincenzo Gann MD   vitamin B-12 (CYANOCOBALAMIN) 100 MCG tablet  11/24/21  Yes Historical Provider, MD   hydrOXYzine (ATARAX) 25 MG tablet TAKE ONE TABLET BY MOUTH TWO TIMES A DAY 11/13/21  Yes Historical Provider, MD   isosorbide mononitrate (IMDUR) 30 MG extended release tablet TAKE FOUR (4) TABLETS BY MOUTH EVERY DAY 11/14/21  Yes Historical Provider, MD   [address] [id number] UNIT/GM powder APPLY TO AFFECTED AREA OF ABDOMINAL FOLDS EVERY 12 HOURS AS NEEDED 11/12/21  Yes Historical Provider, MD   sertraline (ZOLOFT) 50 MG tablet TAKE 1 TABLET BY MOUTH DAILY 11/20/21  Yes Historical Provider, MD   insulin glargine (LANTUS SOLOSTAR) 100 UNIT/ML injection pen 70 UNITS AT BEDTIME 12/13/21  Yes Tyler Morocho MD   insulin aspart (NOVOLOG FLEXPEN) 100 UNIT/ML injection pen 15 UNITS PLUS SLIDING SCALE   LESS THAN 180 NONE  181-250 2 UNITS  251-300 4 UNITS  301-400 6 UNITS   401-500 10 UNITS 12/13/21  Yes Tyler Morocho MD   blood glucose test strips (ONETOUCH VERIO) strip QID 12/13/21  Yes MD Marichuy Lewisuch Delica Lancets 94G MISC QID 12/13/21  Yes Tyler Morocho MD   Blood Glucose Monitoring Suppl (ONETOUCH VERIO) w/Device KIT AS DIRECTED 12/13/21  Yes Tyler Morocho MD   pantoprazole (PROTONIX) 20 MG tablet TAKE 1 TABLET BY MOUTH DAILY 10/26/21  Yes Vincenzo Gann, MD   BRILINTA 90 MG TABS tablet TAKE ONE(1) TABLET TWICE DAILY 10/26/21  Yes Sanna Caceres MD   ARIPiprazole (ABILIFY) 5 MG tablet TAKE 1 TABLET BY MOUTH ONCE DAILY 10/22/21  Yes Matt Herrera MD   Insulin Pen Needle (SURE COMFORT PEN NEEDLES) 30G X 8 MM MISC USE AS DIRECTED FIVE TIMES A DAILY 10/20/21  Yes Rama Andrew MD   ranolazine (RANEXA) 500 MG extended release tablet TAKE 1 TABLET BY MOUTH TWICE DAILY 10/15/21  Yes Sanna Caceres MD   b complex-C-folic acid (NEPHROCAPS) 1 MG capsule Take 1 capsule by mouth daily   Yes MD RADAMES Hughes SOLOSTAR 100 UNIT/ML injection pen 55 units at bedtime 10/8/21  Yes Rama Andrew MD   zoster recombinant adjuvanted vaccine [facility]) 50 MCG/0.5ML SUSR injection Inject 0.5 mLs into the muscle See Admin Instructions 1 dose now and repeat in 2-6 months 9/17/21 3/16/22 Yes Matt Herrera MD   metoprolol tartrate (LOPRESSOR) 25 MG tablet TAKE 1/2 TABLET BY MOUTH TWO TIMES DAILY   Patient taking differently: Take 25 mg by mouth 2 times daily  9/17/21  Yes Matt Herrera MD   insulin aspart (NOVOLOG FLEXPEN) 100 UNIT/ML injection pen INJECT 8-10  units with each meals  Patient taking differently: Inject 0-8 Units into the skin 3 times daily (before meals) And per sliding scale fo 0-150=0 units; 151-200= 2units; 201-250= 4 units; 251-300=6units; 301-350=8 units; 351-400=1Call MD/  CNP 6/28/21  Yes Rama Andrew MD   melatonin 10 MG CAPS capsule Take 1 capsule by mouth nightly 6/23/21  Yes Matt Herrera MD   aspirin EC 81 MG EC tablet Take 1 tablet by mouth daily 5/25/21  Yes Matt Herrera MD   magnesium oxide (MAG-OX) 400 MG tablet Take 1 tablet by mouth daily 4/28/21  Yes Matt Herrera MD   atorvastatin (LIPITOR) 40 MG tablet Take 1 tablet by mouth daily 4/28/21  Yes Matt Herrera MD   blood glucose test strips (FREESTYLE LITE) strip 1 each by Does not apply route 4 times daily (before meals and nightly) As needed. 3/12/21  Yes MARCELO Julio   Alcohol Swabs (EASY TOUCH ALCOHOL PREP MEDIUM) 70 % PADS USE AS DIRECTED THREE TIMES A DAY 3/12/21  Yes MARCELO Julio   FreeStyle Lancets MISC Test 4x daily 3/11/21  Yes MARCELO Julio   docusate sodium (COLACE, DULCOLAX) 100 MG CAPS Take 100 mg by mouth 2 times daily For the prevention and treatment of constipation while taking pain medications. 2/24/21  Yes Lena Dixon PA-C   acetaminophen (TYLENOL) 325 MG tablet Take 2 tablets by mouth every 4 hours as needed for Pain (For mild to moderate pain (Pain 1-6 out of 10 on pain scale)) 2/24/21  Yes Lena Dixon PA-C   Blood Glucose Monitoring Suppl (FREESTYLE LITE) CORETTA 1 Device by Does not apply route daily as needed (Diabetes) Use freestyle meter to test blood sugar as needed 12/29/20  Yes MARCELO Julio   nitroGLYCERIN (NITROSTAT) 0.4 MG SL tablet Place 1 tablet under the tongue every 5 minutes as needed for Chest pain 9/22/15  Yes Historical Provider, MD   pregabalin (LYRICA) 75 MG capsule Take 1 capsule by mouth 2 times daily for 30 days. Take 75 mg by mouth 2 times daily. 10/22/21 11/21/21  Val Moser MD       ALLERGIES: Codeine and Oxycontin [oxycodone hcl]    REVIEW OF SYSTEM:   Review of Systems   Constitutional: Negative for chills, diaphoresis and fever. HENT: Negative for congestion, rhinorrhea and trouble swallowing. Eyes: Negative for visual disturbance. Respiratory: Positive for chest tightness and shortness of breath. Negative for cough and wheezing. Cardiovascular: Positive for chest pain. Negative for palpitations and leg swelling. Gastrointestinal: Negative for abdominal distention, abdominal pain, constipation, diarrhea, nausea and vomiting. Endocrine: Negative. Genitourinary: Negative for difficulty urinating, dysuria, frequency and urgency. Musculoskeletal: Negative for back pain and gait problem. Skin: Negative for wound. Neurological: Negative for dizziness, seizures, syncope, speech difficulty, weakness, numbness and headaches. Hematological: Does not bruise/bleed easily. Psychiatric/Behavioral: Negative. OBJECTIVE  PHYSICAL EXAM:   Physical Exam  Vitals and nursing note reviewed. Constitutional:       General: She is not in acute distress. Appearance: She is not ill-appearing or diaphoretic. HENT:      Head: Normocephalic. Nose: Nose normal.      Mouth/Throat:      Mouth: Mucous membranes are moist.   Eyes:      Pupils: Pupils are equal, round, and reactive to light. Neck:      Vascular: No carotid bruit. Cardiovascular:      Rate and Rhythm: Normal rate and regular rhythm. Pulses: Normal pulses. Heart sounds: Normal heart sounds. No murmur heard. No friction rub. No gallop. Pulmonary:      Effort: Pulmonary effort is normal. No respiratory distress. Breath sounds: Normal breath sounds. No stridor. No wheezing, rhonchi or rales. Chest:      Chest wall: No tenderness. Abdominal:      General: Abdomen is flat. Palpations: Abdomen is soft. Musculoskeletal:         General: Normal range of motion. Cervical back: Normal range of motion. Right lower leg: No edema. Left lower leg: No edema. Skin:     General: Skin is warm and dry. Capillary Refill: Capillary refill takes less than 2 seconds. Neurological:      General: No focal deficit present. Mental Status: She is alert and oriented to person, place, and time. Psychiatric:         Mood and Affect: Mood normal.         Behavior: Behavior normal.          BP (!) 131/59   Pulse 70   Temp 98.2 °F (36.8 °C) (Oral)   Resp 18   Ht 5' 7\" (1.702 m)   Wt 223 lb (101.2 kg)   LMP  (LMP Unknown)   SpO2 98%   BMI 34.93 kg/m²     DATA:     Diagnostic tests reviewed for today's visit:    Most recent labs and imaging results reviewed.      LABS:    Recent Results (from the past 24 hour(s))   Comprehensive Metabolic Panel    Collection Time: 01/12/22  2:00 PM   Result Value Ref Range    Sodium 137 135 - 144 mEq/L    Potassium 4.0 3.4 - 4.9 mEq/L    Chloride 96 95 - 107 mEq/L    CO2 25 20 - 31 mEq/L    Anion Gap 16 (H) 9 - 15 mEq/L    Glucose 267 (H) 70 - 99 mg/dL    BUN 28 (H) 8 - 23 mg/dL    CREATININE 4.77 (H) 0.50 - 0.90 mg/dL    GFR Non-African American 9.2 (L) >60    GFR  11.1 (L) >60    Calcium 8.7 8.5 - 9.9 mg/dL    Total Protein 6.8 6.3 - 8.0 g/dL    Albumin 3.8 3.5 - 4.6 g/dL    Total Bilirubin 0.6 0.2 - 0.7 mg/dL    Alkaline Phosphatase 103 40 - 130 U/L    ALT 18 0 - 33 U/L    AST 22 0 - 35 U/L    Globulin 3.0 2.3 - 3.5 g/dL   CBC Auto Differential    Collection Time: 01/12/22  2:00 PM   Result Value Ref Range    WBC 8.6 4.8 - 10.8 K/uL    RBC 3.16 (L) 4.20 - 5.40 M/uL    Hemoglobin 10.1 (L) 12.0 - 16.0 g/dL    Hematocrit 30.2 (L) 37.0 - 47.0 %    MCV 95.5 82.0 - 100.0 fL    MCH 32.0 (H) 27.0 - 31.3 pg    MCHC 33.5 33.0 - 37.0 %    RDW 17.9 (H) 11.5 - 14.5 %    Platelets 233 872 - 332 K/uL    Neutrophils % 68.6 %    Lymphocytes % 19.3 %    Monocytes % 9.5 %    Eosinophils % 2.0 %    Basophils % 0.6 %    Neutrophils Absolute 5.9 1.4 - 6.5 K/uL    Lymphocytes Absolute 1.7 1.0 - 4.8 K/uL    Monocytes Absolute 0.8 0.2 - 0.8 K/uL    Eosinophils Absolute 0.2 0.0 - 0.7 K/uL    Basophils Absolute 0.0 0.0 - 0.2 K/uL   CK    Collection Time: 01/12/22  2:00 PM   Result Value Ref Range    Total CK 64 0 - 170 U/L   Troponin    Collection Time: 01/12/22  2:00 PM   Result Value Ref Range    Troponin 0.047 (HH) 0.000 - 0.010 ng/mL   EKG 12 Lead    Collection Time: 01/12/22  2:10 PM   Result Value Ref Range    Ventricular Rate 69 BPM    Atrial Rate 69 BPM    P-R Interval 254 ms    QRS Duration 136 ms    Q-T Interval 466 ms    QTc Calculation (Bazett) 499 ms    P Axis -28 degrees    R Axis -52 degrees    T Axis 54 degrees   Rapid Influenza A/B Antigens    Collection Time: 01/12/22 3:17 PM    Specimen: Nasopharyngeal   Result Value Ref Range    Influenza A by PCR Negative     Influenza B by PCR Negative    COVID-19, Rapid    Collection Time: 01/12/22  3:17 PM    Specimen: Nasopharyngeal Swab   Result Value Ref Range    SARS-CoV-2, NAAT Not Detected Not Detected   Brain natriuretic peptide    Collection Time: 01/13/22  6:08 AM   Result Value Ref Range    Pro-BNP 6,568 pg/mL   CBC    Collection Time: 01/13/22  6:08 AM   Result Value Ref Range    WBC 7.1 4.8 - 10.8 K/uL    RBC 2.97 (L) 4.20 - 5.40 M/uL    Hemoglobin 9.6 (L) 12.0 - 16.0 g/dL    Hematocrit 28.6 (L) 37.0 - 47.0 %    MCV 96.4 82.0 - 100.0 fL    MCH 32.4 (H) 27.0 - 31.3 pg    MCHC 33.6 33.0 - 37.0 %    RDW 18.6 (H) 11.5 - 14.5 %    Platelets 353 923 - 257 K/uL   Troponin    Collection Time: 01/13/22  6:08 AM   Result Value Ref Range    Troponin 0.054 (HH) 0.000 - 0.010 ng/mL   Lipid Panel    Collection Time: 01/13/22  6:08 AM   Result Value Ref Range    Cholesterol, Total 123 0 - 199 mg/dL    Triglycerides 228 (H) 0 - 150 mg/dL    HDL 39 (L) 40 - 59 mg/dL    LDL Calculated 38 0 - 129 mg/dL   Magnesium    Collection Time: 01/13/22  6:08 AM   Result Value Ref Range    Magnesium 2.3 1.7 - 2.4 mg/dL   POCT Glucose    Collection Time: 01/13/22  6:37 AM   Result Value Ref Range    POC Glucose 140 (H) 60 - 115 mg/dl    Performed on ACCU-CHEK        IMAGING:  XR CHEST (2 VW)    Result Date: 1/12/2022  EXAMINATION: XR CHEST (2 VW)  CLINICAL HISTORY: Midsternal chest pain COMPARISONS: December 22, 2021 1115 hours  FINDINGS: Two views of the chest are submitted. The cardiac silhouette is enlarged Pulmonary vascular attenuated. Lung posterior hyperinflated. Right sided trachea. No focal infiltrates. No effusions. No Pneumothoraces. NO ACUTE ACTIVE CARDIOPULMONARY PROCESS.  RADIOGRAPHIC FINDINGS OF COPD    CTA CHEST W WO CONTRAST    Result Date: 1/12/2022  EXAM:CTA CHEST W WO CONTRAST DATE1/12/2022 5:37 PM: REASON FOR EXAM:Acute severe anterior chest wall pain. sharp CP with SOB. Hx of high D-dimers. Moderate to high wells score. COMPARISON: Chest abdomen and pelvis CT from October 11, 3785 Technique: Helical CT was performed through the chest following the IV administration of100  cc of nonionic contrast. 3-D MIP reconstructions were performed on an independent workstation in the coronal plane with thick slab technique utilized in the interpretation. 3-D maximum intensity projection performed. All CT scans at this facility use dose modulation, iterative reconstruction, and/or weight based dosing when appropriate to reduce radiation dose to as low as reasonably achievable. CHEST CTA  FINDINGS: Mediastinum: Mediastinum and swapna are normal. There are no pathologically enlarged lymph nodes. The chest wall and lower neck are normal Heart: The heart is enlarged, cardiomegaly. There is no pericardial effusion. Vascular structures: There is no evidence for thoracic aortic aneurysm or dissection. No evidence of traumatic aortic injury. There are no persistent filling defects within the pulmonary arteries. Lungs: There are no focal infiltrates or consolidations. There is no pneumothorax. Pleura: No pleural effusion or thickening. Upper abdomen: The visualized portions of the upper abdomen are unremarkable. Bones/axillae/soft tissues: There is no acute fracture or subluxation. There is loss of vertebral body height in the midthoracic spine which has been present since the prior study. Negative CTA of the chest. No evidence of thoracic aortic dissection or aneurysm. The study is negative for pulmonary emboli. VTE Prophylaxis: low molecular weight heparin -  continue    ASSESSMENT   Angina at rest  Elevated troponin - flat pattern in the setting of ESRD  ESRD on HD  htn  Angina  Copd  DM II    PLAN:   1. As always, aggressive risk factor modification is strongly recommended.  We should adhere to the JNC VIII guidelines for HTN management and the NCEPATP III guidelines for LDL-C management. 2. Monitor on telemetry  3. Maximize cardiac medications - continue ASA 81mg po daily, Lipitor 80mg po daily, Lovenox, Metoprolol 25mg. Increase Imdur to 60mg po daily   4. GI/DVT prophylaxis  5. Maintain potassium greater than 4, magnesium greater than 2  6. Possible coronary angio tomorrow  7. Further recommendations to follow        Plan of care discussed with: patient    SIGNATURE: LORETO Woodard - CNP  DATE: January 13, 2022  TIME: 10:09 AM   Dr. Richmond Mcnamara DO - supervising physician    Attending Supervising [de-identified] Attestation Statement  The patient is a 59 y.o. female. I have performed a history and physical examination of the patient. I discussed the case with the nurse practitioner. I reviewed the patient's Past Medical History, Past Surgical History, Medications, and Allergies.      Physical Exam:  Vitals:    01/13/22 0011 01/13/22 0028 01/13/22 0328 01/13/22 0722   BP: (!) 122/54 106/64 (!) 128/52 (!) 131/59   Pulse: 65  72 70   Resp:   16 18   Temp:    98.2 °F (36.8 °C)   TempSrc:    Oral   SpO2:   100% 98%   Weight:       Height:           Review of Systems - Respiratory ROS: positive for - shortness of breath  Cardiovascular ROS: positive for - chest pain, dyspnea on exertion and shortness of breath  Gastrointestinal ROS: no abdominal pain, change in bowel habits, or black or bloody stools    Pulmonary/Chest: clear to auscultation bilaterally- no wheezes, rales or rhonchi, normal air movement, no respiratory distress  Cardiovascular: normal rate, normal S1 and S2, no gallops, intact distal pulses and no carotid bruits  Abdomen: soft, non-tender, non-distended, normal bowel sounds, no masses or organomegaly    Active Hospital Problems    Diagnosis Date Noted    Anginal chest pain at rest [facility]) [I20.8] 01/12/2022     Priority: Low        I reviewed and agree with the findings and plan documented in her note . Impression    NSTEMI  Angina class IV  Hx of CAD status post recent PCI of the mid LAD at [facility] clinic with rotational atherectomy with small perforation requiring covered stent. Patient already has 3 layers of stents in the LAD. End-stage renal disease hemodialysis dependent   Hypertension  Hyperlipidemia  Diabetes mellitus  Psych disorder        Plan     1. We will plan for cardiac catheterization tomorrow given symptoms, elevated troponins and recent high risk PCI with covered stent in 3 layers of stents present in the LAD. Patient is high risk for restenosis. 2. Maximize cardiac medications. Ideally should be on aspirin statin beta-blocker ACE inhibitor/ARB and titrate as tolerated. 3. Continue with dual antiplatelet therapy. 4. Continue to monitor on telemetry for any tachycardia or bradycardia arrhythmias  5. Maintain potassium greater than 4, magnesium greater than 2  6. GI/DVT prophylaxis  7. Nephrology recommendations. 8. Further recommendations to follow.        Electronically signed by Rena Franco DO on 1/13/22 at 10:59 AM EST

## 2022-01-13 NOTE — PROGRESS NOTES
Pharmacy Dose Adjustment Per Protocol    Hguo Bernstein is a 59 y.o. female. Recent Labs     01/12/22  1400   BUN 28*   CREATININE 4.77*   Estimated Creatinine Clearance: 15 mL/min (A) (based on SCr of 4.77 mg/dL (H)). Hannah Diego Height: 5' 7\" (170.2 cm), Weight: 223 lb (101.2 kg)    Drug Ordered Therapeutic Interchange (CrCL ? 30 mL/min)   [x] Enoxaparin 40 mg subq daily [x] Enoxaparin 30 mg subq daily   [] Enoxaparin 1 mg/kg subq BID [] Enoxaparin ________ (1 mg/kg) subq daily    [] Enoxaparin 30 subq BID [] Enoxaparin 30 mg subq daily     Enoxaparin decreased per protocol due to renal function. Thank you,    LANDON Perry. Ph.  1/13/2022  12:13 AM

## 2022-01-13 NOTE — FLOWSHEET NOTE
0000 Pt arrived to the floor. Admission and assessment completed. Home medications have been reconciled. Pt complaining of 7/10 midsternal chest pain. One sublingual nitro was given and provided some relief.     Radha Mendoza served Dr. Joshua Rodriguez updating him on pt status. New orders received. Will continue to monitor.

## 2022-01-14 ENCOUNTER — APPOINTMENT (OUTPATIENT)
Dept: CARDIAC CATH/INVASIVE PROCEDURES | Age: 62
DRG: 280 | End: 2022-01-14
Payer: MEDICARE

## 2022-01-14 VITALS
HEART RATE: 66 BPM | WEIGHT: 225 LBS | TEMPERATURE: 97.7 F | SYSTOLIC BLOOD PRESSURE: 105 MMHG | RESPIRATION RATE: 12 BRPM | HEIGHT: 67 IN | DIASTOLIC BLOOD PRESSURE: 48 MMHG | OXYGEN SATURATION: 99 % | BODY MASS INDEX: 35.31 KG/M2

## 2022-01-14 LAB
ANTI-XA UNFRAC HEPARIN: 1.56 IU/ML
GLUCOSE BLD-MCNC: 189 MG/DL (ref 60–115)
GLUCOSE BLD-MCNC: 280 MG/DL (ref 60–115)
HCT VFR BLD CALC: 27.9 % (ref 37–47)
HCT VFR BLD CALC: 29 % (ref 37–47)
HEMOGLOBIN: 9.4 G/DL (ref 12–16)
HEMOGLOBIN: 9.8 G/DL (ref 12–16)
INR BLD: 1.1
INR BLD: 1.2
MCH RBC QN AUTO: 32.2 PG (ref 27–31.3)
MCH RBC QN AUTO: 32.7 PG (ref 27–31.3)
MCHC RBC AUTO-ENTMCNC: 33.6 % (ref 33–37)
MCHC RBC AUTO-ENTMCNC: 33.8 % (ref 33–37)
MCV RBC AUTO: 95.5 FL (ref 82–100)
MCV RBC AUTO: 97.2 FL (ref 82–100)
PDW BLD-RTO: 17.6 % (ref 11.5–14.5)
PDW BLD-RTO: 18.8 % (ref 11.5–14.5)
PERFORMED ON: ABNORMAL
PERFORMED ON: ABNORMAL
PLATELET # BLD: 170 K/UL (ref 130–400)
PLATELET # BLD: 189 K/UL (ref 130–400)
PROTHROMBIN TIME: 14.4 SEC (ref 12.3–14.9)
PROTHROMBIN TIME: 15.5 SEC (ref 12.3–14.9)
RBC # BLD: 2.86 M/UL (ref 4.2–5.4)
RBC # BLD: 3.04 M/UL (ref 4.2–5.4)
WBC # BLD: 6.3 K/UL (ref 4.8–10.8)
WBC # BLD: 7.2 K/UL (ref 4.8–10.8)

## 2022-01-14 PROCEDURE — 85730 THROMBOPLASTIN TIME PARTIAL: CPT

## 2022-01-14 PROCEDURE — 85610 PROTHROMBIN TIME: CPT

## 2022-01-14 PROCEDURE — 6360000002 HC RX W HCPCS: Performed by: INTERNAL MEDICINE

## 2022-01-14 PROCEDURE — 93458 L HRT ARTERY/VENTRICLE ANGIO: CPT

## 2022-01-14 PROCEDURE — 97162 PT EVAL MOD COMPLEX 30 MIN: CPT

## 2022-01-14 PROCEDURE — 2709999900 HC NON-CHARGEABLE SUPPLY

## 2022-01-14 PROCEDURE — 2060000000 HC ICU INTERMEDIATE R&B

## 2022-01-14 PROCEDURE — 36415 COLL VENOUS BLD VENIPUNCTURE: CPT

## 2022-01-14 PROCEDURE — 4A023N7 MEASUREMENT OF CARDIAC SAMPLING AND PRESSURE, LEFT HEART, PERCUTANEOUS APPROACH: ICD-10-PCS | Performed by: INTERNAL MEDICINE

## 2022-01-14 PROCEDURE — 2580000003 HC RX 258

## 2022-01-14 PROCEDURE — 97165 OT EVAL LOW COMPLEX 30 MIN: CPT

## 2022-01-14 PROCEDURE — 2500000003 HC RX 250 WO HCPCS

## 2022-01-14 PROCEDURE — 93458 L HRT ARTERY/VENTRICLE ANGIO: CPT | Performed by: INTERNAL MEDICINE

## 2022-01-14 PROCEDURE — 85027 COMPLETE CBC AUTOMATED: CPT

## 2022-01-14 PROCEDURE — 2580000003 HC RX 258: Performed by: INTERNAL MEDICINE

## 2022-01-14 PROCEDURE — 2700000000 HC OXYGEN THERAPY PER DAY

## 2022-01-14 PROCEDURE — 6360000002 HC RX W HCPCS

## 2022-01-14 PROCEDURE — 6370000000 HC RX 637 (ALT 250 FOR IP): Performed by: INTERNAL MEDICINE

## 2022-01-14 PROCEDURE — 85520 HEPARIN ASSAY: CPT

## 2022-01-14 PROCEDURE — 6360000004 HC RX CONTRAST MEDICATION: Performed by: INTERNAL MEDICINE

## 2022-01-14 PROCEDURE — B2111ZZ FLUOROSCOPY OF MULTIPLE CORONARY ARTERIES USING LOW OSMOLAR CONTRAST: ICD-10-PCS | Performed by: INTERNAL MEDICINE

## 2022-01-14 PROCEDURE — 2580000003 HC RX 258: Performed by: NURSE PRACTITIONER

## 2022-01-14 PROCEDURE — C1894 INTRO/SHEATH, NON-LASER: HCPCS

## 2022-01-14 PROCEDURE — C1769 GUIDE WIRE: HCPCS

## 2022-01-14 PROCEDURE — 6370000000 HC RX 637 (ALT 250 FOR IP): Performed by: NURSE PRACTITIONER

## 2022-01-14 RX ORDER — HEPARIN SODIUM 1000 [USP'U]/ML
80 INJECTION, SOLUTION INTRAVENOUS; SUBCUTANEOUS PRN
Status: DISCONTINUED | OUTPATIENT
Start: 2022-01-14 | End: 2022-01-15 | Stop reason: HOSPADM

## 2022-01-14 RX ORDER — IODIXANOL 320 MG/ML
50 INJECTION, SOLUTION INTRAVASCULAR ONCE
Status: COMPLETED | OUTPATIENT
Start: 2022-01-14 | End: 2022-01-14

## 2022-01-14 RX ORDER — HEPARIN SODIUM 1000 [USP'U]/ML
40 INJECTION, SOLUTION INTRAVENOUS; SUBCUTANEOUS PRN
Status: DISCONTINUED | OUTPATIENT
Start: 2022-01-14 | End: 2022-01-15 | Stop reason: HOSPADM

## 2022-01-14 RX ORDER — SODIUM CHLORIDE 9 MG/ML
INJECTION, SOLUTION INTRAVENOUS CONTINUOUS
Status: DISCONTINUED | OUTPATIENT
Start: 2022-01-14 | End: 2022-01-14

## 2022-01-14 RX ORDER — HEPARIN SODIUM 1000 [USP'U]/ML
80 INJECTION, SOLUTION INTRAVENOUS; SUBCUTANEOUS ONCE
Status: COMPLETED | OUTPATIENT
Start: 2022-01-14 | End: 2022-01-14

## 2022-01-14 RX ORDER — SODIUM CHLORIDE 9 MG/ML
25 INJECTION, SOLUTION INTRAVENOUS PRN
Status: DISCONTINUED | OUTPATIENT
Start: 2022-01-14 | End: 2022-01-15 | Stop reason: HOSPADM

## 2022-01-14 RX ORDER — PREDNISONE 50 MG/1
50 TABLET ORAL ONCE
Status: DISCONTINUED | OUTPATIENT
Start: 2022-01-14 | End: 2022-01-15 | Stop reason: HOSPADM

## 2022-01-14 RX ORDER — SODIUM CHLORIDE 0.9 % (FLUSH) 0.9 %
5-40 SYRINGE (ML) INJECTION PRN
Status: DISCONTINUED | OUTPATIENT
Start: 2022-01-14 | End: 2022-01-15 | Stop reason: HOSPADM

## 2022-01-14 RX ORDER — SODIUM CHLORIDE 0.9 % (FLUSH) 0.9 %
5-40 SYRINGE (ML) INJECTION EVERY 12 HOURS SCHEDULED
Status: DISCONTINUED | OUTPATIENT
Start: 2022-01-14 | End: 2022-01-15 | Stop reason: HOSPADM

## 2022-01-14 RX ORDER — FENTANYL CITRATE 50 UG/ML
25 INJECTION, SOLUTION INTRAMUSCULAR; INTRAVENOUS
Status: DISCONTINUED | OUTPATIENT
Start: 2022-01-14 | End: 2022-01-14

## 2022-01-14 RX ORDER — LABETALOL HYDROCHLORIDE 5 MG/ML
10 INJECTION, SOLUTION INTRAVENOUS EVERY 30 MIN PRN
Status: DISCONTINUED | OUTPATIENT
Start: 2022-01-14 | End: 2022-01-15 | Stop reason: HOSPADM

## 2022-01-14 RX ORDER — HYDRALAZINE HYDROCHLORIDE 20 MG/ML
10 INJECTION INTRAMUSCULAR; INTRAVENOUS EVERY 10 MIN PRN
Status: DISCONTINUED | OUTPATIENT
Start: 2022-01-14 | End: 2022-01-15 | Stop reason: HOSPADM

## 2022-01-14 RX ADMIN — ISOSORBIDE MONONITRATE 60 MG: 60 TABLET, EXTENDED RELEASE ORAL at 10:25

## 2022-01-14 RX ADMIN — HEPARIN SODIUM 8170 UNITS: 1000 INJECTION INTRAVENOUS; SUBCUTANEOUS at 18:27

## 2022-01-14 RX ADMIN — SODIUM CHLORIDE, PRESERVATIVE FREE 10 ML: 5 INJECTION INTRAVENOUS at 10:29

## 2022-01-14 RX ADMIN — SERTRALINE HYDROCHLORIDE 50 MG: 50 TABLET ORAL at 10:26

## 2022-01-14 RX ADMIN — IODIXANOL 50 ML: 320 INJECTION, SOLUTION INTRAVASCULAR at 14:49

## 2022-01-14 RX ADMIN — TICAGRELOR 90 MG: 90 TABLET ORAL at 20:26

## 2022-01-14 RX ADMIN — MICONAZOLE NITRATE: 2 POWDER TOPICAL at 10:29

## 2022-01-14 RX ADMIN — METOPROLOL 25 MG: 25 TABLET ORAL at 10:26

## 2022-01-14 RX ADMIN — SODIUM CHLORIDE: 9 INJECTION, SOLUTION INTRAVENOUS at 13:03

## 2022-01-14 RX ADMIN — ATORVASTATIN CALCIUM 80 MG: 80 TABLET, FILM COATED ORAL at 20:26

## 2022-01-14 RX ADMIN — PREGABALIN 75 MG: 75 CAPSULE ORAL at 20:27

## 2022-01-14 RX ADMIN — METOPROLOL 25 MG: 25 TABLET ORAL at 20:26

## 2022-01-14 RX ADMIN — FLUTICASONE PROPIONATE 1 SPRAY: 50 SPRAY, METERED NASAL at 10:26

## 2022-01-14 RX ADMIN — Medication 400 MG: at 10:26

## 2022-01-14 RX ADMIN — MICONAZOLE NITRATE: 2 POWDER TOPICAL at 21:23

## 2022-01-14 RX ADMIN — RANOLAZINE 500 MG: 500 TABLET, FILM COATED, EXTENDED RELEASE ORAL at 10:25

## 2022-01-14 RX ADMIN — RANOLAZINE 500 MG: 500 TABLET, FILM COATED, EXTENDED RELEASE ORAL at 20:26

## 2022-01-14 RX ADMIN — PREGABALIN 75 MG: 75 CAPSULE ORAL at 10:25

## 2022-01-14 RX ADMIN — TICAGRELOR 90 MG: 90 TABLET ORAL at 10:26

## 2022-01-14 RX ADMIN — PANTOPRAZOLE SODIUM 20 MG: 20 TABLET, DELAYED RELEASE ORAL at 10:25

## 2022-01-14 RX ADMIN — ASPIRIN 81 MG: 81 TABLET, CHEWABLE ORAL at 10:25

## 2022-01-14 RX ADMIN — ARIPIPRAZOLE 5 MG: 5 TABLET ORAL at 10:25

## 2022-01-14 RX ADMIN — INSULIN LISPRO 6 UNITS: 100 INJECTION, SOLUTION INTRAVENOUS; SUBCUTANEOUS at 18:25

## 2022-01-14 RX ADMIN — HEPARIN SODIUM 18 UNITS/KG/HR: 10000 INJECTION INTRAVENOUS; SUBCUTANEOUS at 18:29

## 2022-01-14 NOTE — PROGRESS NOTES
[facility] OCCUPATIONAL THERAPY EVALUATION - ACUTE     NAME: Daneil Olszewski  : 1960 (33 y.o.)  MRN: [id number]  CODE STATUS: Full Code  Room: H801/D873-58    Date of Service: 2022    Patient Diagnosis(es): Anginal chest pain at rest [facility]) [I20.8]  Dyspnea and respiratory abnormalities [R06.00, R06.89]  Chronic renal failure syndrome, stage 5 (HCC) [N18.5]  Chest pain, unspecified type [R07.9]  Unstable angina ([address] [I20.0]   Chief Complaint   Patient presents with    Chest Pain     sob, falls      Patient Active Problem List    Diagnosis Date Noted    Unstable angina ([facility] 2022    Chest pain     Anginal chest pain at rest [facility]) 2022    Hemodialysis-associated hypotension 10/22/2021    Chronic pain of both shoulders 2021    Nonrheumatic mitral valve regurgitation 2021    Nonrheumatic tricuspid valve regurgitation 2021    Need for extended care facility 2021    Multiple closed fractures of ribs of right side 2021    Depression 2021    Chronic obstructive pulmonary disease ([address] 2021    Critical illness polyneuropathy ([address] 2021    Compression fracture of spine ([address] 2021    Closed rib fracture 2021    Chest wall contusion, left, initial encounter 2021    Falls frequently 2021    Post PTCA     Headache, unspecified 2021    COVID-19 2020    Moderate persistent asthma without complication     Weakness 2020    Difficulty in walking 2020    Atherosclerotic heart disease of native coronary artery with unspecified angina pectoris ([address] 2020    Essential (primary) hypertension 2020    Pain, unspecified 2020    ESRD (end stage renal disease) on dialysis ([address] 2020    Other seizures ([address] 2020    Paranoid schizophrenia ([address] 2020    Renal failure 2020    Recurrent falls 2020    Edema 2019    Closed supracondylar fracture of right humerus 12/23/2019    Other chronic pain 12/23/2019    Palliative care patient 12/23/2019    Class 2 severe obesity with serious comorbidity and body mass index (BMI) of 36.0 to 36.9 in adult [facility]) 12/03/2019    Sleep apnea, unspecified 11/05/2019    Pulmonary hypertension, unspecified ([address] 11/05/2019    Chronic diastolic congestive heart failure ([address] 10/04/2019    Shortness of breath 10/02/2019    Tardive dyskinesia 05/16/2019    Angina, class II ([address]     Controlled type 2 diabetes mellitus with diabetic neuropathy, with long-term current use of insulin ([address] 02/24/2017    Other specified diabetes mellitus with diabetic neuropathy, unspecified ([address] 08/04/2016    Stented coronary artery-plan is to stay on Plavix indefinately per Dr Dorian Stoner 06/02/2016    History of seizures     Urinary incontinence due to cognitive impairment     History of type C viral hepatitis     Diabetic nephropathy with proteinuria ([address]     Incontinence of urine 04/07/2014    Vitamin D insufficiency 02/04/2014    Vitamin B 12 deficiency 06/05/2013    Cerebral microvascular disease 06/05/2013    Hemiparesis, left ([address] 01/01/2013    Schizophrenia, paranoid, chronic     Metabolic syndrome     Mixed hyperlipidemia 12/09/2010    Other hammer toe (acquired) 12/13/2007        Past Medical History:   Diagnosis Date    Angina at rest [facility]) 5/24/2020    Anxiety     CAD S/P percutaneous coronary angioplasty 2015, 2018    stents per dr Sierra Little    CHF (congestive heart failure) ([address]     CKD (chronic kidney disease) stage 4, GFR 15-29 ml/min ([address] 2/24/2018    CKD stage 4 due to type 2 diabetes mellitus ([address]     Contusion of right chest wall 2/16/2021    COPD (chronic obstructive pulmonary disease) ([address]     Diabetic nephropathy with proteinuria ([address] 2014    DJD (degenerative joint disease) of knee     Dr Kadi Walker GERD (gastroesophageal reflux disease)     Hemiparesis, left [facility]) 2013    entered Assisted Living ([facility])    Hemodialysis patient [facility])     Hemodialysis-associated hypotension 10/22/2021    History of heart failure     History of seizures     History of type C viral hepatitis     HTN (hypertension)     Hyperlipidemia     Impaired mobility and activities of daily living     Mediastinal lymphadenopathy     Dr Randle Gowers, Candyce Dimmer    Metabolic syndrome     Moderate persistent asthma without complication     Need for extended care facility 2021    Neurogenic urinary incontinence 2013    Neuropathy in diabetes [facility])     Nonrheumatic mitral valve regurgitation 2021    Nonrheumatic tricuspid valve regurgitation 2021    Obesity (BMI 30-39. 9)     Recurrent UTI     S/P colonoscopy     [facility], focal active colitis    Schizophrenia, paranoid, chronic ([address]     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 Barney Cortes      x1 Dr. Stephanie Dumas, Dr Christopher Aponte 2018   Fantasma Wilson  08/10/2021    [facility]    DIAGNOSTIC CARDIAC CATH LAB PROCEDURE  10/02/2019    HYSTERECTOMY, TOTAL ABDOMINAL      one ovary intact, Dr Rose Burden, menorrhagia    KS TOTAL KNEE ARTHROPLASTY Left 2018    LEFT KNEE TOTAL KNEE ARTHROPLASTY, SHAYNA, NERVE BLOCK performed by Caron Schlatter[facility] at [facility]  PTCA      TOE AMPUTATION Right     TOTAL KNEE ARTHROPLASTY  05/19/16    Dr Elena Lyle TUNNELED Izell Golas Right 07/01/2020    tunneled HD catheter per Dr Sanchez Form: Marie Haas in place: Yes  Type of devices: All fall risk precautions in place   Initially in place: No    Subjective:Pt pleasant and cooperative with session. Pain Reassessment: 0/10 pain reported. Prior Level of Function:  Social/Functional History  Lives With: Daughter (pt reports that someone is always with her)  Type of Home: House  Home Layout: Two level,Bed/Bath upstairs,1/2 bath on main level (12 steps to shower upstairs)  Home Access: Ramped entrance  Bathroom Shower/Tub: Tub/Shower unit  Bathroom Equipment: Shower chair  Home Equipment: Rolling walker (rollator)  ADL Assistance: Needs assistance (dtr assists with bathing; dtr assists with socks/shoes)  Bath: Minimal assistance  Homemaking Assistance:  (dtr completes all)  Homemaking Responsibilities: No  Ambulation Assistance: Independent (pt uses rollator at all times. [employer] for second floor)  Transfer Assistance: Independent  Active : No  Additional Comments: 2 falls in last week per pt report. Someone is always walking with her up the steps. Pt reports that she has 24 hour supervision    OBJECTIVE:     Orientation Status:  Orientation  Overall Orientation Status: Within Functional Limits    Observation:  Observation/Palpation  Posture: Fair  Observation: mild flexed posture (Simultaneous filing.  User may not have seen previous data.)    Cognition Status:  Cognition  Cognition Comment: follows one step commands consistently    Perception Status:  Perception  Overall Perceptual Status: WFL    Sensation Status:  Sensation  Overall Sensation Status: Impaired  Light Touch: Partial deficits in the RLE,Partial deficits in the LLE    Vision and Hearing Status:  Vision  Vision: Impaired  Vision Exceptions: Wears glasses for reading  Hearing  Hearing: Within functional limits     ROM:   LUE AROM (degrees)  LUE General AROM: limited shoulder flexion  Left Hand AROM (degrees)  Left Hand AROM: WFL  RUE AROM (degrees)  RUE General AROM: limited shoulder flexion  Right Hand AROM (degrees)  Right Hand AROM: WFL    Strength:  LUE Strength  L Hand General: 4/5  LUE Strength Comment: limited shoulder  RUE Strength  R Hand General: 4/5  RUE Strength Comment: limited shoulder    Coordination, Tone, Quality of Movement: Tone RUE  RUE Tone: Normotonic  Tone LUE  LUE Tone: Normotonic  Coordination  Movements Are Fluid And Coordinated: No  Coordination and Movement description: Right UE,Left UE,Decreased speed    Hand Dominance:  Hand Dominance  Hand Dominance: Left    ADL Status:  ADL  Feeding: Unable to assess(comment)  Grooming: Setup,Increased time to complete  UE Bathing: Minimal assistance,Increased time to complete  LE Bathing:  Moderate assistance,Increased time to complete  UE Dressing: Stand by assistance,Increased time to complete  LE Dressing: Increased time to complete  Toileting: Unable to assess(comment)          Therapy key for assistance levels -   Independent = Pt. is able to perform task with no assistance but may require a device   Stand by assistance = Pt. does not perform task at an independent level but does not need physical assistance, requires verbal cues  Minimal, Moderate, Maximal Assistance = Pt. requires physical assistance (25%, 50%, 75% assist from helper) for task but is able to actively participate in task   Dependent = Pt. requires total assistance with task and is not able to actively participate with task completion     Functional Mobility:     Transfers  Sit to stand: Stand by assistance  Stand to sit: Stand by assistance    Bed Mobility  Bed mobility  Rolling to Left: Supervision  Supine to Sit: Supervision    Seated and Standing Balance:  Balance  Sitting Balance: Supervision  Standing Balance: Supervision    Functional Endurance:  Activity Tolerance  Activity Tolerance: Patient Tolerated treatment well    D/C Recommendations:  OT D/C RECOMMENDATIONS  REQUIRES OT FOLLOW UP: No    Equipment Recommendations:       OT Education:        OT Follow Up:  OT D/C RECOMMENDATIONS  REQUIRES OT FOLLOW UP: No       Assessment/Discharge Disposition:     Prognosis: Good  Discharge Recommendations: Continue to assess pending progress  Decision Making: Low Complexity  History: multiple  Exam: no deficits  Assistance / Modification: MIn/mod A    Six Click Score    How much help for putting on and taking off regular lower body clothing?: None  How much help for Bathing?: A Little  How much help for Toileting?: None  How much help for putting on and taking off regular upper body clothing?: None  How much help for taking care of personal grooming?: None  How much help for eating meals?: None  AM-[facility] Inpatient Daily Activity Raw Score: 23  AM-PAC Inpatient ADL T-Scale Score : 51.12  ADL Inpatient CMS 0-100% Score: 15.86    Plan:  Plan  Times per week: N/A    Goals:   Patient will:      Patient Goal: Patient goals :  To return to home when ready      Discussed and agreed upon: Yes Comments:     Therapy Time:   OT Individual Minutes  Time In: 0842  Time Out: 0856  Minutes: 14    Eval: 14 minutes     Electronically signed by:    CATHY Beck OTR/MARIAM  2/92/7744, 9:16 AM Electronically signed by CATHY Beck on 3/62/14 at 9:15 AM EST

## 2022-01-14 NOTE — CARE COORDINATION
[facility] [place] Case Management Initial Discharge Assessment    Met with Patient to discuss discharge plan. PCP: Julia Penny MD                                Date of Last Visit: COUPLE MONTHS AGO    Discharge Planning    Living Arrangements: at home dependent on family care DTR HELPS    Who do you live with? DTR RODRIGUEZ    Who helps you with your care:  self or family, [place] Kay    If lives at home:     Do you have any barriers navigating in your home? yes - FREQUENT FALLS    Patient can perform ADL? Yes    Current Services (outpatient and in home) :  Arsenio JOYCE IS DTR RODRIGUEZ    [facility]    Is transportation available to get to your appointments? Yes DTR DRIVES    DME Equipment:  yes - ROLATOR    Respiratory equipment: None    Respiratory provider:  no     Pharmacy:  yes - [address] [place] [address] with Medication Assistance Program?  No      Patient agreeable to [facility]? Declined    Patient agreeable to SNF/Rehab? Yes, [employer] [facility] ONLY IF NEEDED    Other discharge needs identified? N/A    Does Patient Have a High-Risk for Readmission Diagnosis (CHF, PN, MI, COPD)? Yes, see care coordinator assessment    If Yes,     Consult with pulmonologist? No   Consult with cardiologist? Yes   Cardiac Rehab referral if EF <35%? N/A   Consult with Pharmacy for medication assessment prior to discharge? No   Consult with Behavioral health to aid in depression, anxiety, or coping issues? No   Palliative Care Consult? No   Pulmonary Rehab order for COPD, PN, and CHF (if EF > 35%)? No    Does patient have a reliable scale and know how to read it (for CHF)? Yes   Nutrition consult for CHF? N/A   Respiratory therapy consult that includes bedside instruction on administration of nebulizers and/or inhalers, and assessment of oxygen and equipment needs in the home? Yes    Initial Discharge Plan?  (Note: please see concurrent daily documentation for any updates after initial note). MET WITH PATIENT, PT ADMITS TO FALLS AT HOME. DISCUSSED DC PLAN, PT IS AGREEABLE TO SNF IF REC. BY PT/OT. AWAITING EVALS.  PT NOTIFIED OF CHANGE TO INPATIENT, VERBAL CONSENT GIVEN FOR IMM    Readmission Risk              Risk of Unplanned Readmission:  42         Electronically signed by Radha Monzon RN on 1/14/2022 at 8:06 AM

## 2022-01-14 NOTE — PROGRESS NOTES
Nephrology Progress Note    Assessment:  ESRDX  Chest pain constant  OHDx CAD  Schizophrenia  Weakness legs almost falls  DM type-2        Plan: cardiac cath today  Tolerated IHD  Wants to go to Rehab  Will have PT see patient    Patient Active Problem List:     Atherosclerotic heart disease of native coronary artery with unspecified angina pectoris (HCC)     Schizophrenia, paranoid, chronic     Metabolic syndrome     Vitamin B 12 deficiency     Cerebral microvascular disease     Mixed hyperlipidemia     Other hammer toe (acquired)     Vitamin D insufficiency     Incontinence of urine     Diabetic nephropathy with proteinuria ([address]     Essential (primary) hypertension     History of type C viral hepatitis     Urinary incontinence due to cognitive impairment     History of seizures     Stented coronary artery-plan is to stay on Plavix indefinately per Dr Kedar Izaguirre     Other specified diabetes mellitus with diabetic neuropathy, unspecified ([address]     Controlled type 2 diabetes mellitus with diabetic neuropathy, with long-term current use of insulin (HCC)     Hemiparesis, left (HCC)     Angina, class II ([address]     Pain, unspecified     Tardive dyskinesia     Shortness of breath     Chronic diastolic congestive heart failure ([address]     Sleep apnea, unspecified     Pulmonary hypertension, unspecified (HCC)     Class 2 severe obesity with serious comorbidity and body mass index (BMI) of 36.0 to 36.9 in [facility])     Edema     Closed supracondylar fracture of right humerus     Other chronic pain     Palliative care patient     Recurrent falls     Renal failure     Difficulty in walking     ESRD (end stage renal disease) on dialysis ([address]     Weakness     Other seizures (HCC)     Moderate persistent asthma without complication     [id number]     Post PTCA     Falls frequently     Chest wall contusion, left, initial encounter     Headache, unspecified     Paranoid schizophrenia ([address]     Compression fracture of spine ([address] Closed rib fracture     Depression     Chronic obstructive pulmonary disease (HCC)     Critical illness polyneuropathy (HCC)     Multiple closed fractures of ribs of right side     Nonrheumatic mitral valve regurgitation     Nonrheumatic tricuspid valve regurgitation     Need for extended care facility     Chronic pain of both shoulders     Hemodialysis-associated hypotension     Anginal chest pain at rest [facility])     Chest pain     Unstable angina (HCC)      Subjective:  Admit Date: 1/12/2022    Interval History: very blans about her chest pain  Constant and can aggrevate pain by pushing chest wall but oblivously has high risks for recurrent CAD    Medications:  Scheduled Meds:   sodium chloride flush  5-40 mL IntraVENous 2 times per day    aspirin  81 mg Oral Daily    enoxaparin  30 mg SubCUTAneous Daily    atorvastatin  80 mg Oral Nightly    pantoprazole  20 mg Oral Daily    ARIPiprazole  5 mg Oral Daily    b complex-C-folic acid  1 capsule Oral Daily    docusate sodium  100 mg Oral BID    fluticasone  1 spray Each Nostril Daily    magnesium oxide  400 mg Oral Daily    melatonin  10 mg Oral Nightly    metoprolol tartrate  25 mg Oral BID    midodrine  10 mg Oral 2 times per day on Tue Thu Sat    miconazole   Topical BID    pregabalin  75 mg Oral BID    ranolazine  500 mg Oral BID    senna  1 tablet Oral Nightly    sertraline  50 mg Oral Daily    triamcinolone   Topical Daily    vitamin B-12  100 mcg Oral Daily    heparin (porcine)  2,000 Units IntraVENous Once    ticagrelor  90 mg Oral BID    insulin glargine  70 Units SubCUTAneous Nightly    insulin lispro  0-12 Units SubCUTAneous TID WC    insulin lispro  0-6 Units SubCUTAneous Nightly    isosorbide mononitrate  60 mg Oral Daily     Continuous Infusions:   sodium chloride         CBC:   Recent Labs     01/12/22  1400 01/13/22  0608   WBC 8.6 7.1   HGB 10.1* 9.6*    183     CMP:    Recent Labs     01/12/22  1400      K 4.0 CL 96   CO2 25   BUN 28*   CREATININE 4.77*   GLUCOSE 267*   CALCIUM 8.7   LABGLOM 9.2*     Troponin:   Recent Labs     01/13/22  1037   TROPONINI 0.053*     BNP: No results for input(s): BNP in the last 72 hours. INR: No results for input(s): INR in the last 72 hours. Lipids:   Recent Labs     01/13/22  0608   CHOL 123   TRIG 228*   HDL 39*     Liver:   Recent Labs     01/12/22  1400   AST 22   ALT 18   ALKPHOS 103   PROT 6.8   LABALBU 3.8   BILITOT 0.6     Iron:  No results for input(s): IRONS, FERRITIN in the last 72 hours. Invalid input(s): LABIRONS  Urinalysis: No results for input(s): UA in the last 72 hours.     Objective:  Vitals: BP (!) 134/52   Pulse 72   Temp 98.2 °F (36.8 °C) (Oral)   Resp 18   Ht 5' 7\" (1.702 m)   Wt 225 lb (102.1 kg)   LMP  (LMP Unknown)   SpO2 97%   BMI 35.24 kg/m²    Wt Readings from Last 3 Encounters:   01/13/22 225 lb (102.1 kg)   12/22/21 227 lb (103 kg)   12/14/21 230 lb (104.3 kg)      24HR INTAKE/OUTPUT:      Intake/Output Summary (Last 24 hours) at 1/14/2022 0736  Last data filed at 1/13/2022 1850  Gross per 24 hour   Intake 400 ml   Output 2400 ml   Net -2000 ml       General: alert, in no apparent distress  HEENT: normocephalic, atraumatic, anicteric  Neck: supple, no mass  Lungs: non-labored respirations, clear to auscultation bilaterally  Heart: regular rate and rhythm, no murmurs or rubs  Abdomen: soft, non-tender, non-distended  Ext: no cyanosis, no peripheral edema  Neuro: alert and oriented, no gross abnormalities  Psych: normal mood and affect  Skin: no rash      Electronically signed by Joshua Stoner DO, MD

## 2022-01-14 NOTE — PROGRESS NOTES
Physical Therapy Med Surg Initial Assessment  Facility/Department: [address] [place] [address]  Room: [place] [id number]       NAME: Elodia Omer  : 1960 (36 y.o.)  MRN: [id number]  CODE STATUS: Full Code    Date of Service: 2022    Patient Diagnosis(es): Anginal chest pain at rest [facility]) [I20.8]  Dyspnea and respiratory abnormalities [R06.00, R06.89]  Chronic renal failure syndrome, stage 5 (HCC) [N18.5]  Chest pain, unspecified type [R07.9]  Unstable angina ([facility] [I20.0]   Chief Complaint   Patient presents with    Chest Pain     sob, falls      Patient Active Problem List    Diagnosis Date Noted    Unstable angina ([address] 2022    Chest pain     Anginal chest pain at rest [facility]) 2022    Hemodialysis-associated hypotension 10/22/2021    Chronic pain of both shoulders 2021    Nonrheumatic mitral valve regurgitation 2021    Nonrheumatic tricuspid valve regurgitation 2021    Need for extended care facility 2021    Multiple closed fractures of ribs of right side 2021    Depression 2021    Chronic obstructive pulmonary disease ([address] 2021    Critical illness polyneuropathy ([address] 2021    Compression fracture of spine ([address] 2021    Closed rib fracture 2021    Chest wall contusion, left, initial encounter 2021    Falls frequently 2021    Post PTCA     Headache, unspecified 2021    COVID-19 2020    Moderate persistent asthma without complication     Weakness 2020    Difficulty in walking 2020    Atherosclerotic heart disease of native coronary artery with unspecified angina pectoris ([address] 2020    Essential (primary) hypertension 2020    Pain, unspecified 2020    ESRD (end stage renal disease) on dialysis ([address] 2020    Other seizures ([address] 2020    Paranoid schizophrenia ([facility] 2020    Renal failure 2020    Recurrent falls 2020    Edema 12/23/2019    Closed supracondylar fracture of right humerus 12/23/2019    Other chronic pain 12/23/2019    Palliative care patient 12/23/2019    Class 2 severe obesity with serious comorbidity and body mass index (BMI) of 36.0 to 36.9 in adult [facility]) 12/03/2019    Sleep apnea, unspecified 11/05/2019    Pulmonary hypertension, unspecified ([address] 11/05/2019    Chronic diastolic congestive heart failure ([address] 10/04/2019    Shortness of breath 10/02/2019    Tardive dyskinesia 05/16/2019    Angina, class II ([address]     Controlled type 2 diabetes mellitus with diabetic neuropathy, with long-term current use of insulin ([address] 02/24/2017    Other specified diabetes mellitus with diabetic neuropathy, unspecified ([address] 08/04/2016    Stented coronary artery-plan is to stay on Plavix indefinately per Dr Armida Gonzales 06/02/2016    History of seizures     Urinary incontinence due to cognitive impairment     History of type C viral hepatitis     Diabetic nephropathy with proteinuria ([address]     Incontinence of urine 04/07/2014    Vitamin D insufficiency 02/04/2014    Vitamin B 12 deficiency 06/05/2013    Cerebral microvascular disease 06/05/2013    Hemiparesis, left ([address] 01/01/2013    Schizophrenia, paranoid, chronic     Metabolic syndrome     Mixed hyperlipidemia 12/09/2010    Other hammer toe (acquired) 12/13/2007        Past Medical History:   Diagnosis Date    Angina at rest [facility]) 5/24/2020    Anxiety     CAD S/P percutaneous coronary angioplasty 2015, 2018    stents per dr Overton Frankel    CHF (congestive heart failure) ([address]     CKD (chronic kidney disease) stage 4, GFR 15-29 ml/min ([address] 2/24/2018    CKD stage 4 due to type 2 diabetes mellitus ([address]     Contusion of right chest wall 2/16/2021    COPD (chronic obstructive pulmonary disease) ([address]     Diabetic nephropathy with proteinuria ([address] 2014    DJD (degenerative joint disease) of knee     Dr Carlo Donaldson GERD (gastroesophageal reflux disease)     Hemiparesis, left ([address]     entered Assisted Living ([place])    Hemodialysis patient [facility])     Hemodialysis-associated hypotension 10/22/2021    History of heart failure     History of seizures     History of type C viral hepatitis     HTN (hypertension)     Hyperlipidemia     Impaired mobility and activities of daily living     Mediastinal lymphadenopathy     [facility] Herron    Metabolic syndrome     Moderate persistent asthma without complication     Need for extended care facility 2021    Neurogenic urinary incontinence     Neuropathy in diabetes [facility])     Nonrheumatic mitral valve regurgitation 2021    Nonrheumatic tricuspid valve regurgitation 2021    Obesity (BMI 30-39. 9)     Recurrent UTI     S/P colonoscopy     [facility], focal active colitis    Schizophrenia, paranoid, chronic ([address]     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 Josh Sharma      x1 Dr. Alvino Melton, Dr Merna Null  08/10/2021    [facility]    DIAGNOSTIC CARDIAC CATH LAB PROCEDURE  10/02/2019    HYSTERECTOMY, TOTAL ABDOMINAL      one ovary intact, Dr Cris Sin, menorrhagia    [facility] Left 2018    LEFT KNEE TOTAL KNEE ARTHROPLASTY, SHAYNA, NERVE BLOCK performed by Yisel Fitzgerald, MD at [address] [place] [address] PTCA      TOE AMPUTATION Right     TOTAL KNEE ARTHROPLASTY  05/19/16    Dr Rema Gillette TUNNELED [address] Right 07/01/2020    tunneled HD catheter per Dr Italo Key       Chart Reviewed: Yes  Family / Caregiver Present: No  General Comment  Comments: Pt resting in bed - agreeable to PT evaluation    Restrictions:  Restrictions/Precautions: Fall Risk     SUBJECTIVE: Subjective: \"I'm doing ok. \"    Pain  Pre Treatment Pain Screening  Pain at present: 0    Post Treatment Pain Screening:   Pain Assessment  Pain Assessment:  (unchanged)    Prior Level of Function:  Social/Functional History  Lives With: Daughter (pt reports that someone is always with her)  Type of Home: House  Home Layout: Two level,Bed/Bath upstairs,1/2 bath on main level (12 steps to shower upstairs)  Home Access: Ramped entrance  Bathroom Shower/Tub: Tub/Shower unit  Bathroom Equipment: Shower chair  Home Equipment: Rolling walker (rollator)  ADL Assistance: Needs assistance (dtr assists with bathing; dtr assists with socks/shoes)  Homemaking Assistance:  (dtr completes all)  Ambulation Assistance: Independent (pt uses rollator at all times. [employer] for second floor)  Transfer Assistance: Independent  Additional Comments: 2 falls in last week per pt report.  Someone is always walking with her up the steps    OBJECTIVE:   Vision Exceptions: Wears glasses for reading  Hearing: Within functional limits    Cognition:  Overall Orientation Status: Within Functional Limits  Follows Commands: Within Functional Limits         ROM:  RLE PROM: WFL  LLE PROM: WFL    Strength:  Strength RLE  Strength RLE: WFL  Strength LLE  Strength LLE: WFL  Strength Other  Other: Trunk strength grossly 2/5 functionally assessed    Neuro:  Balance  Sitting - Static: Good  Sitting - Dynamic: Good  Standing - Static: Fair  Standing - Dynamic: Fair     Motor Control  Gross Motor?: WFL  Sensation  Overall Sensation Status: WFL (however pt reports chronic B LE neuropathy that tingles on/off)    Bed mobility  Rolling to Left: Supervision  Supine to Sit: Supervision  Comment: Increased time and effort to complete    Transfers  Sit to Stand: Stand by assistance  Stand to sit: Stand by assistance  Bed to Chair: Stand by assistance  Comment: Increased time to complete. Cues for hand placement. Ambulation  Ambulation?: Yes  Ambulation 1  Surface: level tile  Device:  (rollator)  Assistance: Stand by assistance;Supervision  Quality of Gait: Decreased foot clearance bilaterally; FF over rollator however maintains postural control. Safe management of obstacles. Distance: 50ft X 2 with turn in chavez  Comments: Slow pacing    Stairs/Curb  Stairs?: No         Activity Tolerance  Activity Tolerance: Patient Tolerated treatment well          PT Education  PT Education: Goals;PT Role;Plan of Care    ASSESSMENT:   Body structures, Functions, Activity limitations: Decreased functional mobility ; Decreased strength;Decreased ADL status; Decreased safe awareness;Decreased endurance;Decreased balance  Decision Making: Medium Complexity  History: High  Exam: Med  Clinical Presentation: Med    Prognosis: Good  Barriers to Learning: none    DISCHARGE RECOMMENDATIONS:  Discharge Recommendations: Continue to assess pending progress,Patient would benefit from continued therapy after discharge    Assessment: Continued PT indicated to progress mobility and facilitate DC at highest level of indep and safety. Rec f/u PT upon DC.  REQUIRES PT FOLLOW UP: Yes      PLAN OF CARE:  Plan  Times per week: 3-6  Current Treatment Recommendations: Strengthening,Balance Training,Functional Mobility Training,Transfer Training,Gait Training,Stair training,Endurance Training,Neuromuscular Re-education,Patient/Caregiver Education & Training,Equipment Evaluation, Education, & procurement,Home Exercise Program,Safety Education & Training,ADL/Self-care Training,Modalities  Safety Devices  Type of devices:  All fall risk precautions in place    Goals:  Long term goals  Long term goal 1: Pt to complete bed mobility indep  Long term goal 2: Pt to complete transfers with indep  Long term goal 3: Pt to ambulate 50ft with rollator indep  Long term goal 4: Pt to manage 12 steps with HR and indep    AMPAC (6 CLICK) BASIC MOBILITY  AM-PAC Inpatient Mobility Raw Score : 18     Therapy Time:   Individual   Time In 0842   Time Out 0856   Minutes [address] [place], [place], 01/14/22 at 9:17 AM         Definitions for assistance levels  Independent = pt does not require any physical supervision or assistance from another person for activity completion. Device may be needed.   Stand by assistance = pt requires verbal cues or instructions from another person, close to but not touching, to perform the activity  Minimal assistance= pt performs 75% or more of the activity; assistance is required to complete the activity  Moderate assistance= pt performs 50% of the activity; assistance is required to complete the activity  Maximal assistance = pt performs 25% of the activity; assistance is required to complete the activity  Dependent = pt requires total physical assistance to accomplish the task

## 2022-01-14 NOTE — BRIEF OP NOTE
Section of Cardiology  Adult Brief Cardiac Cath Procedure Note        Procedure(s):  LHC, b/l coronary angio    Pre-operative Diagnosis:  nstemi    H&P Status: Completed and reviewed. Post-operative Diagnosis:      LC EF of 55%  LM normal   LAD 99% mid to distal LAD IST with heavy thrombus burden  CX dominant, mild disease  RCA small caliber, mild disease    Findings:  See full report    Complications:  none    Primary Proceduralist:   Dr.Wes Howell DO    Plan    DAPT  Heparin gtt  Max cardiac [facility]  Transfer to [facility] for CABG evaluation.        Full procedure note to follow

## 2022-01-15 LAB — APTT: >150 SEC (ref 24.4–36.8)

## 2022-01-15 PROCEDURE — 99239 HOSP IP/OBS DSCHRG MGMT >30: CPT | Performed by: INTERNAL MEDICINE

## 2022-01-15 NOTE — FLOWSHEET NOTE
Report called to Vaibhav Muse at [facility]. Patient and daughter aware of transfer.  at 9PM. Dr. Chau Harley called  Regarding post cath fluids. Orders to D/C.

## 2022-01-17 ENCOUNTER — CARE COORDINATION (OUTPATIENT)
Dept: CASE MANAGEMENT | Age: 62
End: 2022-01-17

## 2022-01-17 ENCOUNTER — CARE COORDINATION (OUTPATIENT)
Dept: CARE COORDINATION | Age: 62
End: 2022-01-17

## 2022-01-17 NOTE — PROGRESS NOTES
Physical Therapy  Facility/Department: [facility] MED SURG J006/[id number]  Physical Therapy Discharge      NAME: Leidy Reasonstacey    : 1960 (4 y.o.)  MRN: [id number]    Account: [de-identified]  Gender: female      Patient has been discharged from acute care hospital. DC patient from current PT program.      Electronically signed by Myron Ambrosio PT on 22 at 12:26 PM EST

## 2022-01-17 NOTE — CARE COORDINATION
[facility] Transitions Follow Up Call    2022    Patient: Neva Gimenez  Patient : 1960   MRN: [id number]  Reason for Admission:   Discharge Date: 1/15/22 RARS: Readmission Risk Score: 25.5 ( )       [facility] patient discharged to [facility] on 1/15/22, next outreach placed for 22       Care Transitions Subsequent and Final Call    Subsequent and Final Calls  Care Transitions Interventions  Other Interventions:            Follow Up  Future Appointments   Date Time Provider Aminata Cortes   2022 11:00 AM Travis Conn APRN - CNP [facility]   2022 10:30 AM Alyssa Macedo MD [address]   3/14/2022 10:45 AM Tao Varma MD [facility]   3/14/2022 12:00 PM Steve Horvath MD [address] [place] [address]   5/10/2022 12:00 PM Alyssa Macedo, [address] [place] [address]

## 2022-01-18 NOTE — PROGRESS NOTES
Physician Progress Note      PATIENT:               Abilio Ovalle  CSN #:                  [id number]  :                       1960  ADMIT DATE:       2022 1:59 PM  [address] [place] DATE:        1/15/2022 1:50 AM  RESPONDING  PROVIDER #:        CARIN LAND DO          QUERY TEXT:    Noted documentation of NSTEMI in H&P and Brief Op Note. In order to support   the diagnosis of NSTEMI, please include additional clinical indicators in your   documentation. Or please document if the diagnosis of NSTEMI has been ruled   out after further study. The medical record reflects the following:  Risk Factors: HTN  CAD chronic diastolic HF  COPD  C2SZ  Psych disorder  Clinical Indicators: trop 0.047  0.054  0.053  H&P: Angina at rest Elevated troponin - flat pattern in the setting of ESRD    HD  NSTEMI  Angina class IV  Hx of CAD status post recent PCI of the mid LAD   at [facility] clinic with rotational atherectomy with small perforation   requiring covered stent. Patient already has 3 layers of stents in the LAD. We will plan for cardiac catheterization tomorrow given symptoms, elevated   troponins and recent high risk PCI with covered stent in 3 layers of stents   present in the LAD. Patient is high risk for restenosis.  Dr Stalin Mcclellan: End-stage renal disease on hemodialysis support. Chest pain,   probable musculoskeletal.   Dr Stalin Mcclellan: chest pain - Constant and can aggravate pain by pushing chest   wall but obviously has high risks for recurrent CAD  Brief Op Note: Procedure(s):  LHC, b/l coronary angio  Pre-operative   Diagnosis:  nstemi  Post-operative Diagnosis:  LC EF of 55%  LM normal  LAD 99% mid to distal LAD IST with heavy thrombus burden CX   dominant, mild disease RCA small caliber, mild disease  ? Treatment: EKG  Heparin gtt  telemetry/arrhythmias  max cardiac meds  LHC,   b/l coronary angio  transfer to [facility] CABG evaluation. Manuel Hull RN [email or web address]  Options provided:  -- NSTEMI present as evidenced by, Please document evidence.   -- NSTEMI was ruled out  -- Other - I will add my own diagnosis  -- Disagree - Not applicable / Not valid  -- Disagree - Clinically unable to determine / Unknown  -- Refer to Clinical Documentation Reviewer    PROVIDER RESPONSE TEXT:    NSTEMI is present as evidenced by elevated C.E and severe LAD stenosis on   cardiac cath    Query created by: Uziel Myers on 1/18/2022 9:14 AM      Electronically signed by:  Saloni Jones DO 1/18/2022 9:46 AM

## 2022-01-20 NOTE — DISCHARGE SUMMARY
Discharge Summary    Date: 1/20/2022  Patient Name: Viridiana Cota YOB: 1960 Age: 59 y.o. Admit Date: 1/12/2022  Discharge Date: 1/14/2022  Discharge Condition: Good    Admission Diagnosis  Anginal chest pain at rest [facility]) (I20.8); Dyspnea and respiratory abnormalities (R06.00, R06.89); Chronic renal failure syndrome, stage 5 (HCC) (N18.5); Chest pain, unspecified type (R07.9); Unstable angina ([facility]) (I20.0)     Discharge Diagnosis  Active Problems: Chest pain Unstable angina (HCC) Anginal chest pain at rest ([facility])Resolved Problems: * No resolved hospital problems. [facility] Stay  Narrative of Hospital Course:  Patient presented to [facility] with typical anginal symptoms. She had mildly elevated cardiac enzymes. 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 She was maximized on cardiac medications. She was transferred to [facility] for further evaluation ration and treatment given that she is high risk for PCI. Patient had PCI [facility] in August 2021 where she was found to have 2-3 layers of LAD stents and underwent intervention with subsequent perforation and covered stent placement. She was transferred to [address] [place] [address] in stable and satisfactory condition. Consultants:  IP CONSULT TO NEPHROLOGY    Surgeries/procedures Performed:       Treatments:    Cardiac Medications    Beta-Blocker and Other    Discharge Plan/Disposition:  Home    Hospital/Incidental Findings Requiring Follow Up:    Patient Instructions:    Diet: Cardiac Diet    Activity:  For number of days (if applicable): Other Instructions:    Provider Follow-Up:   No follow-ups on file.      Significant Diagnostic Studies:    Recent Labs:  Admission on 01/12/2022, Discharged on 01/15/2022Sodium                                        Date: 01/12/2022Value: 137         Ref range: 135 - 144 mEq/L    Status: FinalPotassium Date: 01/12/2022Value: 4.0         Ref range: 3.4 - 4.9 mEq/L    Status: FinalChloride                                      Date: 01/12/2022Value: 96          Ref range: 95 - 107 mEq/L     Status: FinalCO2                                           Date: 01/12/2022Value: 25          Ref range: 20 - 31 mEq/L      Status: FinalAnion Gap                                     Date: 01/12/2022Value: 16*         Ref range: 9 - 15 mEq/L       Status: FinalGlucose                                       Date: 01/12/2022Value: 267*        Ref range: 70 - 99 mg/dL      Status: FinalBUN                                           Date: 01/12/2022Value: 28*         Ref range: 8 - 23 mg/dL       Status: FinalCREATININE                                    Date: 01/12/2022Value: 4.77*       Ref range: 0.50 - 0.90 mg/dL  Status: FinalGFR Non-                      Date: 01/12/2022Value: 9.2*        Ref range: >60                Status: Final              Comment: >60 mL/min/1.73m2 EGFR, calc. for ages 25 and older using theMDRD formula (not corrected for weight), is valid for stablerenal function. GFR                           Date: 01/12/2022Value: 11.1*       Ref range: >60                Status: Final              Comment: >60 mL/min/1.73m2 EGFR, calc. for ages 25 and older using theMDRD formula (not corrected for weight), is valid for stablerenal function. Calcium                                       Date: 01/12/2022Value: 8.7         Ref range: 8.5 - 9.9 mg/dL    Status: FinalTotal Protein                                 Date: 01/12/2022Value: 6.8         Ref range: 6.3 - 8.0 g/dL     Status: FinalAlbumin                                       Date: 01/12/2022Value: 3.8         Ref range: 3.5 - 4.6 g/dL     Status: FinalTotal Bilirubin                               Date: 01/12/2022Value: 0.6         Ref range: 0.2 - 0.7 mg/dL    Status: FinalAlkaline Phosphatase                          Date: 01/12/2022Value: 103         Ref range: 40 - 130 U/L       Status: FinalALT                                           Date: 01/12/2022Value: 18          Ref range: 0 - 33 U/L         Status: FinalAST                                           Date: 01/12/2022Value: 22          Ref range: 0 - 35 U/L         Status: FinalGlobulin                                      Date: 01/12/2022Value: 3.0         Ref range: 2.3 - 3.5 g/dL     Status: [address] [facility]                                           Date: 01/12/2022Value: 8.6         Ref range: 4.8 - 10.8 K/uL    Status: FinalRBC                                           Date: 01/12/2022Value: 3.16*       Ref range: 4.20 - 5.40 M/uL   Status: FinalHemoglobin                                    Date: 01/12/2022Value: 10.1*       Ref range: 12.0 - 16.0 g/dL   Status: FinalHematocrit                                    Date: 01/12/2022Value: 30.2*       Ref range: 37.0 - 47.0 %      Status: FinalMCV                                           Date: 01/12/2022Value: 95.5        Ref range: 82.0 - 100.0 fL    Status: [address]                                           Date: 01/12/2022Value: 32.0*       Ref range: 27.0 - 31.3 pg     Status: [address]                                          Date: 01/12/2022Value: 33.5        Ref range: 33.0 - 37.0 %      Status: FinalRDW                                           Date: 01/12/2022Value: 17.9*       Ref range: 11.5 - 14.5 %      Status: FinalPlatelets                                     Date: 01/12/2022Value: 177         Ref range: 130 - 400 K/uL     Status: FinalNeutrophils %                                 Date: 01/12/2022Value: 68.6        Ref range: %                  Status: FinalLymphocytes %                                 Date: 01/12/2022Value: 19.3        Ref range: %                  Status: FinalMonocytes %                                   Date: 01/12/2022Value: 9.5         Ref range: %                  Status: FinalEosinophils % Date: 01/12/2022Value: 2.0         Ref range: %                  Status: FinalBasophils %                                   Date: 01/12/2022Value: 0.6         Ref range: %                  Status: FinalNeutrophils Absolute                          Date: 01/12/2022Value: 5.9         Ref range: 1.4 - 6.5 K/uL     Status: FinalLymphocytes Absolute                          Date: 01/12/2022Value: 1.7         Ref range: 1.0 - 4.8 K/uL     Status: FinalMonocytes Absolute                            Date: 01/12/2022Value: 0.8         Ref range: 0.2 - 0.8 K/uL     Status: FinalEosinophils Absolute                          Date: 01/12/2022Value: 0.2         Ref range: 0.0 - 0.7 K/uL     Status: FinalBasophils Absolute                            Date: 01/12/2022Value: 0.0         Ref range: 0.0 - 0.2 K/uL     Status: FinalVentricular Rate                              Date: 01/12/2022Value: 69          Ref range: BPM                Status: FinalAtrial Rate                                   Date: 01/12/2022Value: 69          Ref range: BPM                Status: FinalP-R Interval                                  Date: 01/12/2022Value: 254         Ref range: ms                 Status: FinalQRS Duration                                  Date: 01/12/2022Value: 136         Ref range: ms                 Status: FinalQ-T Interval                                  Date: 01/12/2022Value: 466         Ref range: ms                 Status: FinalQTc Calculation ([place]azett)                      Date: 01/12/2022Value: 499         Ref range: ms                 Status: FinalP Axis                                        Date: 01/12/2022Value: -28         Ref range: degrees            Status: FinalR Axis                                        Date: 01/12/2022Value: -52         Ref range: degrees            Status: FinalT Axis                                        Date: 01/12/2022Value: 54          Ref range: degrees Status: FinalTotal CK                                      Date: 01/12/2022Value: 64          Ref range: 0 - 170 U/L        Status: FinalTroponin                                      Date: 01/12/2022Value: 0.047*      Ref range: 0.000 - 0.010 ng*  Status: Final              Comment: Methodology by Troponin T. Influenza A by PCR                            Date: 01/12/2022Value: Negative      Status: Rebekah Tita B by PCR                            Date: 01/12/2022Value: Negative      Status: DteflOCTX-YlB-7, NAAT                              Date: 01/12/2022Value: Not Detected                   Ref range: Not Detected       Status: Final              Comment: Rapid NAAT:   Negative results should be treated as presumptive and,if inconsistent with clinical signs and symptoms or necessary forpatient management, should be tested with an alternative molecularassay. Negative results do not preclude SARS-CoV-2 infection andshould not be used as the sole basis for patient management decisions. This test has been authorized by the FDA under an Emergency UseAuthorization (EUA) for use by authorized laboratories. Fact sheet for [employer] sheet for Patients: [place].dk: Isothermal Nucleic Acid AmplificationTroponin                                      Date: 01/13/2022Value: 0.053*      Ref range: 0.000 - 0.010 ng*  Status: Final              Comment: Methodology by Troponin T.Pro-BNP                                       Date: 01/13/2022Value: Kendall Riojas       Ref range: pg/mL              Status: Final              Comment: NT-pro BNP ACUTE Interpretive Guidelines:      Age        Cutoff for Heart Failure   Less than 50 yrs   450 pg/mL   50-75 yrs           900 pg/mL   Greater than 75 yrs  1800 pg/mLNT-pro BNP NON-ACUTE Interpretive Guidelines:    Age                 Reference Range  Less than 74 yrs   0-125 pg/mL  Greater than 74 yrs   0-450 pg/mLOther possible causes of an elevated NT-proBNP include:cardiac ischemia, acute coronary syndrome, COPD, pneumonia,atrial fibrillation, pulmonary emboli, pulmonary hypertension,pericarditisReference:ANA Lisa et al. NT-proBNP testing for diagnosis andshort-term prognosis in acute destabilized HF: an internationalpooled analysis of 1256 patients.  Heart Journal.2006;27:330-337WBC                                           Date: 01/13/2022Value: 7.1         Ref range: 4.8 - 10.8 K/uL    Status: FinalRBC                                           Date: 01/13/2022Value: 2.97*       Ref range: 4.20 - 5.40 M/uL   Status: FinalHemoglobin                                    Date: 01/13/2022Value: 9.6*        Ref range: 12.0 - 16.0 g/dL   Status: FinalHematocrit                                    Date: 01/13/2022Value: 28.6*       Ref range: 37.0 - 47.0 %      Status: FinalMCV                                           Date: 01/13/2022Value: 96.4        Ref range: 82.0 - 100.0 fL    Status: [address] [place]                                           Date: 01/13/2022Value: 32.4*       Ref range: 27.0 - 31.3 pg     Status: [address]                                          Date: 01/13/2022Value: 33.6        Ref range: 33.0 - 37.0 %      Status: FinalRDW                                           Date: 01/13/2022Value: 18.6*       Ref range: 11.5 - 14.5 %      Status: FinalPlatelets                                     Date: 01/13/2022Value: 183         Ref range: 130 - 400 K/uL     Status: FinalTroponin                                      Date: 01/13/2022Value: 0.054*      Ref range: 0.000 - 0.010 ng*  Status: Final              Comment: Specimen hemolysis has exceeded the interference as definedby Roche. Value may be falsely decreased. Suggestrecollection if clinically indicated. Methodology by Troponin T. Cholesterol, Total                            Date: 01/13/2022Value: 123         Ref range: 0 - 199 mg/dL Status: Final              Comment: ATP III Cholesterol classification is Desirable. Triglycerides                                 Date: 01/13/2022Value: 228*        Ref range: 0 - 150 mg/dL      Status: Final              Comment: ATP III Triglycerides Classification is High. HDL                                           Date: 01/13/2022Value: 39*         Ref range: 40 - 59 mg/dL      Status: Final              Comment: ATP III HDL Cholestrol Classification is low. Expected Values:Males:    >55 = No Risk          35-55 = Moderate Risk          <35 = High RiskFemales:  >65 = No Risk          45-65 = Moderate Risk          <45 = High RiskNCEP Guidelines:   Third Report May 2001>59 = negative risk factor for CHD<40 = major risk factor for CHDLDL Calculated                                Date: 01/13/2022Value: 38          Ref range: 0 - 129 mg/dL      Status: Final              Comment: ATP III LDL Classification is Optimal.Magnesium                                     Date: 01/13/2022Value: 2.3         Ref range: 1.7 - 2.4 mg/dL    Status: FinalPOC Glucose                                   Date: 01/13/2022Value: 140*        Ref range: 60 - 115 mg/dl     Status: FinalPerformed on                                  Date: 01/13/2022Value: ACCU-CHEK     Status: [address] Glucose                                   Date: 01/13/2022Value: 232*        Ref range: 60 - 115 mg/dl     Status: FinalPerformed on                                  Date: 01/13/2022Value: ACCU-CHEK     Status: [address] Glucose                                   Date: 01/13/2022Value: 105         Ref range: 60 - 115 mg/dl     Status: FinalPerformed on                                  Date: 01/13/2022Value: ACCU-CHEK     Status: [address] Glucose                                   Date: 01/14/2022Value: 189*        Ref range: 60 - 115 mg/dl     Status: FinalPerformed on                                  Date: 01/14/2022Value: ACCU-CHEK     Status: [address] [facility] Date: 01/14/2022Value: 6.3         Ref range: 4.8 - 10.8 K/uL    Status: FinalRBC                                           Date: 01/14/2022Value: 3.04*       Ref range: 4.20 - 5.40 M/uL   Status: FinalHemoglobin                                    Date: 01/14/2022Value: 9.8*        Ref range: 12.0 - 16.0 g/dL   Status: FinalHematocrit                                    Date: 01/14/2022Value: 29.0*       Ref range: 37.0 - 47.0 %      Status: FinalMCV                                           Date: 01/14/2022Value: 95.5        Ref range: 82.0 - 100.0 fL    Status: [address] [place]                                           Date: 01/14/2022Value: 32.2*       Ref range: 27.0 - 31.3 pg     Status: [address]                                          Date: 01/14/2022Value: 33.8        Ref range: 33.0 - 37.0 %      Status: FinalRDW                                           Date: 01/14/2022Value: 17.6*       Ref range: 11.5 - 14.5 %      Status: FinalPlatelets                                     Date: 01/14/2022Value: 170         Ref range: 130 - 400 K/uL     Status: FinalProtime                                       Date: 01/14/2022Value: 14.4        Ref range: 12.3 - 14.9 sec    Status: FinalINR                                           Date: 01/14/2022Value: 1.1           Status: FinalPOC Glucose                                   Date: 01/14/2022Value: 280*        Ref range: 60 - 115 mg/dl     Status: FinalPerformed on                                  Date: 01/14/2022Value: ACCU-CHEK     Status: [address] [facility]                                           Date: 01/14/2022Value: 7.2         Ref range: 4.8 - 10.8 K/uL    Status: FinalRBC                                           Date: 01/14/2022Value: 2.86*       Ref range: 4.20 - 5.40 M/uL   Status: FinalHemoglobin                                    Date: 01/14/2022Value: 9.4*        Ref range: 12.0 - 16.0 g/dL   Status: FinalHematocrit Date: 01/14/2022Value: 27.9*       Ref range: 37.0 - 47.0 %      Status: FinalMCV                                           Date: 01/14/2022Value: 97.2        Ref range: 82.0 - 100.0 fL    Status: [address] [place]                                           Date: 01/14/2022Value: 32.7*       Ref range: 27.0 - 31.3 pg     Status: [address]                                          Date: 01/14/2022Value: 33.6        Ref range: 33.0 - 37.0 %      Status: FinalRDW                                           Date: 01/14/2022Value: 18.8*       Ref range: 11.5 - 14.5 %      Status: FinalPlatelets                                     Date: 01/14/2022Value: 189         Ref range: 130 - 400 K/uL     Status: FinalAnti-XA Unfrac Heparin                        Date: 01/14/2022Value: 1.56        Ref range: IU/mL              Status: Final              Comment: Unfractionated Heparin Therapeutic Range:0.30 - 0.70 IU/mLProtime                                       Date: 01/14/2022Value: 15.5*       Ref range: 12.3 - 14.9 sec    Status: FinalINR                                           Date: 01/14/2022Value: 1.2           Status: FinalaPTT                                          Date: 01/14/2022Value: >150.0*     Ref range: 24.4 - 36.8 sec    Status: Final              Comment: Effective 11/4/2020:Heparin Therapeutic Range: 64.0 - 98.0 seconds. ------------    Radiology last 7 days:  No results found. [unfilled]    Discharge Medications    Discharge Medication List as of 1/15/2022  2:43 AM    Discharge Medication List as of 1/15/2022  2:43 AM    Discharge Medication List as of 1/15/2022  2:43 Jinx Click these medications which have NOT CHANGEDfluticasone (FLONASE) 50 MCG/ACT nasal spray1 spray by Each Nostril route dailyHistorical MedSITagliptin (JANUVIA) 50 MG tabletTake 50 mg by mouth dailyHistorical Medlidocaine-prilocaine (EMLA) 2.5-2.5 % creamApply topically as needed for Pain Apply topically as needed.  3 times a week before dialysis wrap with saran wrap, Topical, PRN, Historical Medsenna (SENOKOT) 8.6 MG tabletTake 1 tablet by mouth nightlyHistorical Medtriamcinolone (KENALOG) 0.1 % creamApply topically daily Apply topically 2 times daily. , Topical, DAILY, Historical Medalbuterol (PROVENTIL) (2.5 MG/3ML) 0.083% nebulizer solutionTake 3 mLs by nebulization every 4 hours as needed for Wheezing, Disp-120 each, R-3Printmidodrine (PROAMATINE) 10 MG tabletTake one tablet on days of dialysis treatment.  three times weekly, Disp-15 tablet, R-5Normalvitamin B-12 (CYANOCOBALAMIN) 100 MCG tabletHistorical MedhydrOXYzine (ATARAX) 25 MG tabletTAKE ONE TABLET BY MOUTH TWO TIMES A DAYHistorical Medisosorbide mononitrate (IMDUR) 30 MG extended release tabletTAKE FOUR (4) TABLETS BY MOUTH EVERY DAYHistorical MedNYAMYC 999649 UNIT/GM powderAPPLY TO AFFECTED AREA OF ABDOMINAL FOLDS EVERY 12 HOURS AS NEEDED, DAWHistorical Medsertraline (ZOLOFT) 50 MG tabletTAKE 1 TABLET BY MOUTH DAILYHistorical Med!! insulin glargine (LANTUS SOLOSTAR) 100 UNIT/ML injection pen70 UNITS AT BEDTIME, Disp-30 pen, R-3Normal!! insulin aspart (NOVOLOG FLEXPEN) 100 UNIT/ML injection pen15 UNITS PLUS SLIDING SCALE LESS THAN 180 [id number] 2 [id number] 4 [id number] 6 UNITS 401-500 10 UNITS, Disp-10 pen, R-3Normal!! blood glucose test strips (ONETOUCH VERIO) stripQID, Disp-300 each, [id number]!! OneTouch Delica Lancets 17K MISCQID, Disp-300 each, R-3NormalBlood Glucose Monitoring Suppl (ONETOUCH VERIO) w/Device KITAS DIRECTED, Disp-1 kit, R-0Normalpantoprazole (PROTONIX) 20 MG tabletTAKE 1 TABLET BY MOUTH DAILY, Disp-30 tablet, R-11NormalBRILINTA 90 MG TABS tabletTAKE ONE(1) TABLET TWICE DAILY, Disp-60 tablet, R-11NormalARIPiprazole (ABILIFY) 5 MG tabletTAKE 1 TABLET BY MOUTH ONCE DAILY, Disp-30 tablet, R-5NormalInsulin Pen Needle (SURE COMFORT PEN NEEDLES) 30G X 8 MM MISCUSE AS DIRECTED FIVE TIMES A DAILY, Disp-100 each, R-10Normalranolazine (RANEXA) 500 MG extended release tabletTAKE 1 TABLET BY MOUTH TWICE DAILY, Disp-60 tablet, [id number] complex-C-folic acid (NEPHROCAPS) 1 MG capsuleTake 1 capsule by mouth dailyHistorical Med!! LANTUS SOLOSTAR 100 UNIT/ML injection pen55 units at bedtime, Disp-10 pen, R-10, DAWNormalzoster recombinant adjuvanted vaccine (SHINGRIX) 50 MCG/0.5ML SUSR injectionInject 0.5 mLs into the muscle See Admin Instructions 1 dose now and repeat in 2-6 months, Disp-0.5 mL, R-0Printmetoprolol tartrate (LOPRESSOR) 25 MG tabletTAKE 1/2 TABLET BY MOUTH TWO TIMES DAILY , Disp-90 tablet, R-4Normal!! insulin aspart (NOVOLOG FLEXPEN) 100 UNIT/ML injection penINJECT 8-10  units with each meals, Disp-10 pen, R-3Normalmelatonin 10 MG CAPS capsuleTake 1 capsule by mouth nightly, Disp-30 capsule, R-12Normalaspirin EC 81 MG EC tabletTake 1 tablet by mouth daily, Disp-30 tablet, R-12Normalmagnesium oxide (MAG-OX) 400 MG tabletTake 1 tablet by mouth daily, Disp-90 tablet, R-4Normalatorvastatin (LIPITOR) 40 MG tabletTake 1 tablet by mouth daily, Disp-90 tablet, R-4Normal!! blood glucose test strips (FREESTYLE LITE) strip1 each by Does not apply route 4 times daily (before meals and nightly) As needed. , Disp-200 strip, R-5**Patient requests 90 days supply**NormalAlcohol Swabs (EASY TOUCH ALCOHOL PREP MEDIUM) 70 % PADSDisp-100 each, R-3, NormalUSE AS DIRECTED THREE TIMES A DAY!! FreeStyle Lancets MISCDisp-150 each, R-3, NormalTest 4x dailydocusate sodium (COLACE, DULCOLAX) 100 MG CAPSTake 100 mg by mouth 2 times daily For the prevention and treatment of constipation while taking pain medications. , Disp-30 capsule, R-0Printacetaminophen (TYLENOL) 325 MG tabletTake 2 tablets by mouth every 4 hours as needed for Pain (For mild to moderate pain (Pain 1-6 out of 10 on pain scale)), Disp-120 tablet, R-0PrintBlood Glucose Monitoring Suppl (FREESTYLE LITE) DEVIDAILY PRN Starting Tue 12/29/2020, Disp-1 Device, R-0, NormalUse freestyle meter to test blood sugar as needed nitroGLYCERIN (NITROSTAT) 0.4 MG SL tabletPlace 1 tablet under the tongue every 5 minutes as needed for Chest painHistorical Medpregabalin (LYRICA) 75 MG capsuleTake 1 capsule by mouth 2 times daily for 30 days. Take 75 mg by mouth 2 times daily. , Disp-60 capsule, R-5Normal!! - Potential duplicate medications found. Please discuss with provider. Discharge Medication List as of 1/15/2022  2:43 AM    Time Spent on Discharge:3E] minutes were spent in patient examination, evaluation, counseling as well as medication reconciliation, prescriptions for required medications, discharge plan, and follow up.     Electronically signed by Rena Franco DO on 1/20/22 at 7:14 AM EST

## 2022-01-24 ENCOUNTER — CARE COORDINATION (OUTPATIENT)
Dept: CARE COORDINATION | Age: 62
End: 2022-01-24

## 2022-01-24 NOTE — CARE COORDINATION
[facility] Transitions Follow Up Call    2022    Patient: Lourdes Gonzales  Patient : 1960   MRN: [id number]  Reason for Admission:   Discharge Date: 1/15/22 RARS: Readmission Risk Score: 25.5 ( )       Patient is still admitted to [facility]. Care Transitions Subsequent and Final Call    Subsequent and Final Calls  Care Transitions Interventions  Other Interventions:            Follow Up  Future Appointments   Date Time Provider Aminata Cortes   2022 10:30 AM Miller Felipe MD [facility]   3/14/2022 10:45 AM Donald Hunter MD [address] [place] [address]   3/14/2022 12:00 PM Remy Brizuela MD [facility]   5/10/2022 12:00 PM Miller Felipe MD [address] [place] [address]

## 2022-01-25 DIAGNOSIS — I10 ESSENTIAL (PRIMARY) HYPERTENSION: ICD-10-CM

## 2022-01-25 RX ORDER — AMLODIPINE BESYLATE 10 MG/1
TABLET ORAL
Qty: 30 TABLET | Refills: 11 | OUTPATIENT
Start: 2022-01-25

## 2022-01-26 ENCOUNTER — TELEPHONE (OUTPATIENT)
Dept: FAMILY MEDICINE CLINIC | Age: 62
End: 2022-01-26

## 2022-01-26 NOTE — TELEPHONE ENCOUNTER
Shay Berrios from [facility] called in regards to ppw needing signed. This includes the plan of treatment and they physician order. She states these were faxed over several time 12/20, 1/11,1/13 and again on 1/17. She was told that the plan of treatment would not be signed because patient was not seen she states patient was not seen because she has been in and out of the hospital which is where she is currently at now. She is asking for the paperwork to be faxed over asap because now they are out of compliance.      Phone number is [phone]    Thank you

## 2022-01-31 ENCOUNTER — CARE COORDINATION (OUTPATIENT)
Dept: CARE COORDINATION | Age: 62
End: 2022-01-31

## 2022-01-31 NOTE — CARE COORDINATION
[facility] 45 Transitions Follow Up Call    2022    Patient: Mahi Fuchs  Patient : 1960   MRN: [id number]  Reason for Admission:   Discharge Date: 1/15/22 RARS: Readmission Risk Score: 25.5 ( )       Patient is still admitted to [facility]. Care Transitions Subsequent and Final Call    Subsequent and Final Calls  Care Transitions Interventions  Other Interventions:            Follow Up  Future Appointments   Date Time Provider Aminata Cortes   3/14/2022 10:45 AM Bola Vargas  [address]   3/14/2022 12:00 PM Abigail Smith  [facility]   5/10/2022 12:00 PM [address]

## 2022-02-07 ENCOUNTER — CARE COORDINATION (OUTPATIENT)
Dept: CARE COORDINATION | Age: 62
End: 2022-02-07

## 2022-02-15 ENCOUNTER — CARE COORDINATION (OUTPATIENT)
Dept: CARE COORDINATION | Age: 62
End: 2022-02-15

## 2022-02-15 NOTE — CARE COORDINATION
ACM completed chart review. Patient continues to be inpatient at [address] ACM will sign off at this time.

## 2022-02-25 DIAGNOSIS — I10 ESSENTIAL (PRIMARY) HYPERTENSION: ICD-10-CM

## 2022-02-28 RX ORDER — AMLODIPINE BESYLATE 10 MG/1
TABLET ORAL
Qty: 30 TABLET | Refills: 5 | OUTPATIENT
Start: 2022-02-28

## 2022-02-28 NOTE — TELEPHONE ENCOUNTER
Requesting medication refill. Please approve or deny this request.    Rx requested:  Requested Prescriptions     Pending Prescriptions Disp Refills    amLODIPine (NORVASC) 10 MG tablet [Pharmacy Med Name: AMLODIPINE 10MG TAB 10 Tablet] 30 tablet 5     Sig: TAKE 1 TABLET BY MOUTH EVERY DAY         Last Office Visit:   12/13/2021      Next Visit Date:  Future Appointments   Date Time Provider Aminata Cortes   3/14/2022 10:45 AM LANDON Hernandez MD [address] [place] [address]   3/14/2022 12:00 PM Abigail Smith MD [address] [place] [address]   5/10/2022 12:00 PM Rufina Torres MD [facility]               Last refill  Please approve or deny.

## 2022-03-03 ENCOUNTER — TELEPHONE (OUTPATIENT)
Dept: INTERVENTIONAL RADIOLOGY/VASCULAR | Age: 62
End: 2022-03-03

## 2022-03-03 ENCOUNTER — PREP FOR PROCEDURE (OUTPATIENT)
Dept: INTERVENTIONAL RADIOLOGY/VASCULAR | Age: 62
End: 2022-03-03

## 2022-03-03 DIAGNOSIS — N18.6 ESRD (END STAGE RENAL DISEASE) ON DIALYSIS (HCC): ICD-10-CM

## 2022-03-03 DIAGNOSIS — T82.590A AV GRAFT MALFUNCTION, INITIAL ENCOUNTER (HCC): Primary | ICD-10-CM

## 2022-03-03 DIAGNOSIS — Z95.828 S/P ARTERIOVENOUS (AV) GRAFT PLACEMENT: ICD-10-CM

## 2022-03-03 DIAGNOSIS — Z99.2 ESRD (END STAGE RENAL DISEASE) ON DIALYSIS (HCC): ICD-10-CM

## 2022-03-03 RX ORDER — LIDOCAINE HYDROCHLORIDE 20 MG/ML
10 INJECTION, SOLUTION INFILTRATION; PERINEURAL
Status: CANCELLED | OUTPATIENT
Start: 2022-03-03

## 2022-03-03 RX ORDER — 0.9 % SODIUM CHLORIDE 0.9 %
250 INTRAVENOUS SOLUTION INTRAVENOUS
Status: CANCELLED | OUTPATIENT
Start: 2022-03-03

## 2022-03-03 RX ORDER — HEPARIN SODIUM 1000 [USP'U]/ML
6000 INJECTION, SOLUTION INTRAVENOUS; SUBCUTANEOUS ONCE
Status: CANCELLED | OUTPATIENT
Start: 2022-03-03 | End: 2022-03-03

## 2022-03-03 RX ORDER — MIDAZOLAM HYDROCHLORIDE 1 MG/ML
0.5 INJECTION INTRAMUSCULAR; INTRAVENOUS
Status: CANCELLED | OUTPATIENT
Start: 2022-03-03

## 2022-03-03 RX ORDER — IODIXANOL 320 MG/ML
100 INJECTION, SOLUTION INTRAVASCULAR
Status: CANCELLED | OUTPATIENT
Start: 2022-03-03

## 2022-03-03 RX ORDER — HEPARIN SODIUM 1000 [USP'U]/ML
1000 INJECTION, SOLUTION INTRAVENOUS; SUBCUTANEOUS ONCE
Status: CANCELLED | OUTPATIENT
Start: 2022-03-03 | End: 2022-03-03

## 2022-03-03 RX ORDER — FENTANYL CITRATE 50 UG/ML
50 INJECTION, SOLUTION INTRAMUSCULAR; INTRAVENOUS
Status: CANCELLED | OUTPATIENT
Start: 2022-03-03

## 2022-03-03 RX ORDER — 0.9 % SODIUM CHLORIDE 0.9 %
1000 INTRAVENOUS SOLUTION INTRAVENOUS
Status: CANCELLED | OUTPATIENT
Start: 2022-03-03

## 2022-03-03 NOTE — TELEPHONE ENCOUNTER
PATIENT WAS GIVEN THIS INFORMATION VIA PHONE CONVERSATION - VOICED UNDERSTANDING  >  YOUR PROCEDURE IS SCHEDULED ON: 3/7/2022 @ 11:00 am  >  You will need to arrive at 10:00 am from home and check in at the Diagnostic Imaging Check In desk.   >  Do not eat or drink after midnight.    >  Make arrangements for transportation, as you should not drive immediately after.  >  Patient to not take morning insulin, diabetic medications or metoprolol morning of procedure

## 2022-03-04 ENCOUNTER — TELEPHONE (OUTPATIENT)
Dept: INTERVENTIONAL RADIOLOGY/VASCULAR | Age: 62
End: 2022-03-04

## 2022-03-04 NOTE — TELEPHONE ENCOUNTER
Called Domi Shafer with [employer] - advised [facility] of Magda Ramirez (Given in transportation) states he cannot bring her on 3/7. I tried to move to 3/9 but patient is being discharged from [facility] that day. Domi Shafer is trying to reach patient's daughter to see what day she can bring patient for fistulogram and she will call me back.

## 2022-03-07 ENCOUNTER — TELEPHONE (OUTPATIENT)
Dept: INTERVENTIONAL RADIOLOGY/VASCULAR | Age: 62
End: 2022-03-07

## 2022-03-07 NOTE — TELEPHONE ENCOUNTER
PATIENT WAS GIVEN THIS INFORMATION VIA PHONE CONVERSATION - VOICED UNDERSTANDING  >  YOUR PROCEDURE IS SCHEDULED ON: 3/18/2022 @ 11:00 am  >  You will need to arrive at 10:00 am from home and check in at the Diagnostic Imaging Check In desk.   >  Do not eat or drink after midnight.    >  Make arrangements for transportation, as you should not drive immediately after.  >  Patient does not need to hold Aspirin and Brilinta  >  Patient to hold all diabetic medications including insulin and metoprolol morning of procedure

## 2022-03-10 ENCOUNTER — CARE COORDINATION (OUTPATIENT)
Dept: CASE MANAGEMENT | Age: 62
End: 2022-03-10

## 2022-03-10 NOTE — CARE COORDINATION
[facility] 45 Transitions Initial Follow Up Call    Call within 2 business days of discharge: Yes    Patient: Opal Lopez Patient : 1960   MRN: [id number]  Reason for Admission: Chest Pain/NSTEMI  Discharge Date: 1/15/22 RARS: Readmission Risk Score: 25.5 ( )      Last Discharge [facility]       Complaint Diagnosis Description Type Department Provider    22 Chest Pain Chest pain, unspecified type . .. ED to Hosp-Admission (Discharged) (ADMITTED) [id number] Virgilio Howell DO; Vinnie Thomas. .. Acute Care Course: Patient admitted to [facility] EMERGENCY [facility] AT [place] for anginal chest pain -1/15. [facility] She then admitted to [facility] 1/15/22 where underwent a NSTEMI. She discharged back to [facility]  until home 3/9. Sig Hx: Chest Pain, Unstable Angina, CKD 5    DME:     Conversation:  Attempted to contact patient for a well check. Unable to reach patient or leave a message d/t no vm set up. Follow up plan: Will notify [facility] patient discharged to home. Non-face-to-face services provided:           Follow Up  Future Appointments   Date Time Provider Aminata Quinterosi   3/14/2022 10:45 AM Claire Torres MD [facility]   3/14/2022 12:00 PM [place] [address]   3/16/2022 11:00 AM MD Yusef Burns [facility] [place]   3/18/2022 11:00 AM YUSEF SPECIAL PROCEDURES ROOM 1 MLOZ SP PROC MOLZ Fac RAD   5/10/2022 12:00 PM Aretha Britton MD . [address]

## 2022-03-11 ENCOUNTER — TELEPHONE (OUTPATIENT)
Dept: FAMILY MEDICINE CLINIC | Age: 62
End: 2022-03-11

## 2022-03-11 NOTE — TELEPHONE ENCOUNTER
----- Message from Rivera Conner sent at 3/10/2022 10:16 AM EST -----  Subject: Message to Provider    QUESTIONS  Information for Provider? patient wants to see her own PCP, Ricardo Lozano, on the 03/16 appt. Please call her if this is not   possible.  ---------------------------------------------------------------------------  --------------  CALL BACK INFO  What is the best way for the office to contact you? OK to leave message on   voicemail  Preferred Call Back Phone Number? [phone]  ---------------------------------------------------------------------------  --------------  SCRIPT ANSWERS  Relationship to Patient?  Self

## 2022-03-11 NOTE — TELEPHONE ENCOUNTER
lvm for patient letting her know that the appt. 3/16 is not with .  she will have to r/s if she wants to see [address] [place] [address]

## 2022-03-16 ENCOUNTER — OFFICE VISIT (OUTPATIENT)
Dept: FAMILY MEDICINE CLINIC | Age: 62
End: 2022-03-16
Payer: MEDICARE

## 2022-03-16 VITALS
WEIGHT: 234 LBS | SYSTOLIC BLOOD PRESSURE: 110 MMHG | OXYGEN SATURATION: 95 % | HEART RATE: 74 BPM | DIASTOLIC BLOOD PRESSURE: 64 MMHG | BODY MASS INDEX: 36.73 KG/M2 | TEMPERATURE: 97.3 F | HEIGHT: 67 IN

## 2022-03-16 DIAGNOSIS — K59.00 CONSTIPATION, UNSPECIFIED CONSTIPATION TYPE: ICD-10-CM

## 2022-03-16 DIAGNOSIS — J45.40 MODERATE PERSISTENT ASTHMA WITHOUT COMPLICATION: ICD-10-CM

## 2022-03-16 DIAGNOSIS — F51.04 PSYCHOPHYSIOLOGICAL INSOMNIA: ICD-10-CM

## 2022-03-16 DIAGNOSIS — N18.6 END STAGE RENAL DISEASE (HCC): ICD-10-CM

## 2022-03-16 DIAGNOSIS — I50.32 CHRONIC DIASTOLIC CONGESTIVE HEART FAILURE (HCC): Primary | ICD-10-CM

## 2022-03-16 DIAGNOSIS — I95.89 HYPOTENSION DUE TO HYPOVOLEMIA: ICD-10-CM

## 2022-03-16 DIAGNOSIS — E86.1 HYPOTENSION DUE TO HYPOVOLEMIA: ICD-10-CM

## 2022-03-16 DIAGNOSIS — R26.2 DIFFICULTY IN WALKING: ICD-10-CM

## 2022-03-16 PROCEDURE — G8427 DOCREV CUR MEDS BY ELIG CLIN: HCPCS | Performed by: INTERNAL MEDICINE

## 2022-03-16 PROCEDURE — 3017F COLORECTAL CA SCREEN DOC REV: CPT | Performed by: INTERNAL MEDICINE

## 2022-03-16 PROCEDURE — 99214 OFFICE O/P EST MOD 30 MIN: CPT | Performed by: INTERNAL MEDICINE

## 2022-03-16 PROCEDURE — 1036F TOBACCO NON-USER: CPT | Performed by: INTERNAL MEDICINE

## 2022-03-16 PROCEDURE — G8417 CALC BMI ABV UP PARAM F/U: HCPCS | Performed by: INTERNAL MEDICINE

## 2022-03-16 PROCEDURE — 1111F DSCHRG MED/CURRENT MED MERGE: CPT | Performed by: INTERNAL MEDICINE

## 2022-03-16 PROCEDURE — G8482 FLU IMMUNIZE ORDER/ADMIN: HCPCS | Performed by: INTERNAL MEDICINE

## 2022-03-16 RX ORDER — FUROSEMIDE 20 MG/1
100 TABLET ORAL 2 TIMES DAILY
COMMUNITY
Start: 2022-02-15 | End: 2022-03-16 | Stop reason: SDUPTHER

## 2022-03-16 RX ORDER — MELATONIN 10 MG
10 CAPSULE ORAL NIGHTLY
Qty: 30 CAPSULE | Refills: 12 | Status: SHIPPED | OUTPATIENT
Start: 2022-03-16

## 2022-03-16 RX ORDER — MIDODRINE HYDROCHLORIDE 5 MG/1
TABLET ORAL
COMMUNITY
Start: 2022-03-15 | End: 2022-03-16 | Stop reason: SDUPTHER

## 2022-03-16 RX ORDER — FUROSEMIDE 20 MG/1
100 TABLET ORAL 2 TIMES DAILY
Qty: 60 TABLET | Refills: 3 | Status: SHIPPED | OUTPATIENT
Start: 2022-03-16 | End: 2022-05-16 | Stop reason: SDUPTHER

## 2022-03-16 RX ORDER — POLYETHYLENE GLYCOL 3350 17 G/17G
17 POWDER, FOR SOLUTION ORAL
COMMUNITY
Start: 2022-02-16 | End: 2022-03-16 | Stop reason: SDUPTHER

## 2022-03-16 RX ORDER — MIDODRINE HYDROCHLORIDE 5 MG/1
TABLET ORAL
Qty: 90 TABLET | Refills: 3 | Status: SHIPPED | OUTPATIENT
Start: 2022-03-16

## 2022-03-16 RX ORDER — ONDANSETRON 4 MG/1
TABLET, FILM COATED ORAL
Qty: 60 TABLET | Refills: 3 | Status: ON HOLD | OUTPATIENT
Start: 2022-03-16 | End: 2022-06-06 | Stop reason: HOSPADM

## 2022-03-16 RX ORDER — POLYETHYLENE GLYCOL 3350 17 G/17G
POWDER, FOR SOLUTION ORAL
Qty: 30 EACH | Refills: 3 | Status: SHIPPED | OUTPATIENT
Start: 2022-03-16 | End: 2022-04-23

## 2022-03-16 RX ORDER — ONDANSETRON 4 MG/1
TABLET, FILM COATED ORAL
COMMUNITY
Start: 2022-03-15 | End: 2022-03-16 | Stop reason: SDUPTHER

## 2022-03-17 ENCOUNTER — PRE-PROCEDURE TELEPHONE (OUTPATIENT)
Dept: INTERVENTIONAL RADIOLOGY/VASCULAR | Age: 62
End: 2022-03-17

## 2022-03-18 ENCOUNTER — HOSPITAL ENCOUNTER (OUTPATIENT)
Dept: INTERVENTIONAL RADIOLOGY/VASCULAR | Age: 62
Discharge: HOME | End: 2022-03-20
Payer: MEDICARE

## 2022-03-18 VITALS
SYSTOLIC BLOOD PRESSURE: 168 MMHG | HEART RATE: 86 BPM | RESPIRATION RATE: 16 BRPM | DIASTOLIC BLOOD PRESSURE: 74 MMHG | OXYGEN SATURATION: 95 %

## 2022-03-18 VITALS
RESPIRATION RATE: 18 BRPM | DIASTOLIC BLOOD PRESSURE: 67 MMHG | SYSTOLIC BLOOD PRESSURE: 165 MMHG | HEART RATE: 82 BPM | OXYGEN SATURATION: 96 %

## 2022-03-18 DIAGNOSIS — Z99.2 ESRD (END STAGE RENAL DISEASE) ON DIALYSIS (HCC): ICD-10-CM

## 2022-03-18 DIAGNOSIS — N18.6 ESRD (END STAGE RENAL DISEASE) ON DIALYSIS (HCC): ICD-10-CM

## 2022-03-18 DIAGNOSIS — Z95.828 S/P ARTERIOVENOUS (AV) GRAFT PLACEMENT: ICD-10-CM

## 2022-03-18 DIAGNOSIS — T82.590A AV GRAFT MALFUNCTION, INITIAL ENCOUNTER (HCC): ICD-10-CM

## 2022-03-18 LAB
GLUCOSE BLD-MCNC: 263 MG/DL (ref 70–99)
PERFORMED ON: ABNORMAL

## 2022-03-18 PROCEDURE — 36907 BALO ANGIOP CTR DIALYSIS SEG: CPT

## 2022-03-18 PROCEDURE — 6360000002 HC RX W HCPCS: Performed by: RADIOLOGY

## 2022-03-18 PROCEDURE — 36907 BALO ANGIOP CTR DIALYSIS SEG: CPT | Performed by: RADIOLOGY

## 2022-03-18 PROCEDURE — 36901 INTRO CATH DIALYSIS CIRCUIT: CPT | Performed by: RADIOLOGY

## 2022-03-18 PROCEDURE — 99214 OFFICE O/P EST MOD 30 MIN: CPT | Performed by: RADIOLOGY

## 2022-03-18 PROCEDURE — 6360000004 HC RX CONTRAST MEDICATION: Performed by: RADIOLOGY

## 2022-03-18 PROCEDURE — 2709999900 IR FISTULAGRAM

## 2022-03-18 PROCEDURE — 2580000003 HC RX 258: Performed by: RADIOLOGY

## 2022-03-18 PROCEDURE — 36902 INTRO CATH DIALYSIS CIRCUIT: CPT

## 2022-03-18 PROCEDURE — 93990 DOPPLER FLOW TESTING: CPT | Performed by: RADIOLOGY

## 2022-03-18 PROCEDURE — A4217 STERILE WATER/SALINE, 500 ML: HCPCS | Performed by: RADIOLOGY

## 2022-03-18 PROCEDURE — 2500000003 HC RX 250 WO HCPCS: Performed by: RADIOLOGY

## 2022-03-18 RX ORDER — SODIUM CHLORIDE 0.9 % (FLUSH) 0.9 %
SYRINGE (ML) INJECTION
Status: COMPLETED | OUTPATIENT
Start: 2022-03-18 | End: 2022-03-18

## 2022-03-18 RX ORDER — HEPARIN SODIUM 1000 [USP'U]/ML
INJECTION, SOLUTION INTRAVENOUS; SUBCUTANEOUS
Status: COMPLETED | OUTPATIENT
Start: 2022-03-18 | End: 2022-03-18

## 2022-03-18 RX ORDER — FENTANYL CITRATE 50 UG/ML
INJECTION, SOLUTION INTRAMUSCULAR; INTRAVENOUS
Status: COMPLETED | OUTPATIENT
Start: 2022-03-18 | End: 2022-03-18

## 2022-03-18 RX ORDER — MIDAZOLAM HYDROCHLORIDE 2 MG/2ML
INJECTION, SOLUTION INTRAMUSCULAR; INTRAVENOUS
Status: COMPLETED | OUTPATIENT
Start: 2022-03-18 | End: 2022-03-18

## 2022-03-18 RX ORDER — MAGNESIUM HYDROXIDE 1200 MG/15ML
LIQUID ORAL CONTINUOUS PRN
Status: COMPLETED | OUTPATIENT
Start: 2022-03-18 | End: 2022-03-18

## 2022-03-18 RX ORDER — IODIXANOL 320 MG/ML
INJECTION, SOLUTION INTRAVASCULAR
Status: COMPLETED | OUTPATIENT
Start: 2022-03-18 | End: 2022-03-18

## 2022-03-18 RX ORDER — LIDOCAINE HYDROCHLORIDE 20 MG/ML
INJECTION, SOLUTION INFILTRATION; PERINEURAL
Status: COMPLETED | OUTPATIENT
Start: 2022-03-18 | End: 2022-03-18

## 2022-03-18 RX ADMIN — HEPARIN SODIUM 4000 UNITS: 1000 INJECTION INTRAVENOUS; SUBCUTANEOUS at 11:58

## 2022-03-18 RX ADMIN — HEPARIN SODIUM 1000 UNITS: 1000 INJECTION INTRAVENOUS; SUBCUTANEOUS at 11:28

## 2022-03-18 RX ADMIN — SODIUM CHLORIDE 1000 ML: 900 IRRIGANT IRRIGATION at 11:28

## 2022-03-18 RX ADMIN — MIDAZOLAM HYDROCHLORIDE 0.5 MG: 1 INJECTION, SOLUTION INTRAMUSCULAR; INTRAVENOUS at 11:42

## 2022-03-18 RX ADMIN — LIDOCAINE HYDROCHLORIDE 2 ML: 20 INJECTION, SOLUTION INFILTRATION; PERINEURAL at 11:50

## 2022-03-18 RX ADMIN — Medication 250 ML: at 11:33

## 2022-03-18 RX ADMIN — FENTANYL CITRATE 50 MCG: 50 INJECTION, SOLUTION INTRAMUSCULAR; INTRAVENOUS at 11:42

## 2022-03-18 RX ADMIN — IODIXANOL 100 ML: 320 INJECTION, SOLUTION INTRAVASCULAR at 11:53

## 2022-03-18 ASSESSMENT — ENCOUNTER SYMPTOMS
DIARRHEA: 0
SHORTNESS OF BREATH: 0
PHOTOPHOBIA: 0
VOMITING: 0
WHEEZING: 0
BACK PAIN: 0
NAUSEA: 0
EYES NEGATIVE: 1
RESPIRATORY NEGATIVE: 1
COUGH: 0
ABDOMINAL PAIN: 0
GASTROINTESTINAL NEGATIVE: 1
CONSTIPATION: 0

## 2022-03-18 NOTE — FLOWSHEET NOTE
1235 - Received pt back to CT holding via cart post fistulagram procedure. A&Ox3. Denies pain or SOB. VSS, on RA. Pt asking for coffee - sat up on cart and given coffee / jennifer crackers, tolerating well. Right arm dressing to fistulagram site c/d/i. [place] [facility] called and unable to get a ETA for pickup, pt requested we call her daughter. Shayy Devon called and will arrive at [phone] to pick patient up.     1250 - D/C instructions reviewed with patient by [facility]AT RN. Verbal understanding indicated. Pt provided with AVS and nursing communication re: purse string suture removal - tomorrow at dialysis. Darcy was called re: purse string suture removal as well. 1320 - IV out and monitors off. Pt assisted with getting dressed and into a wheelchair. Pt taken to surgery entrance where she was picked up by family member. Pt's rollator walker wheeled with her and given to family member to load in [place].  Electronically signed by Harjinder Danielson RN on 3/18/2022 at 1:27 PM

## 2022-03-18 NOTE — H&P
CC: Right arm swelling     HPI: Patient is a pleasant 64-year-old woman with end-stage kidney disease receiving dialysis through a right arm AV fistula which was created 2 years ago. Since then patient had balloon angioplasty approximately 8 months ago at an outside hospital.  Patient has been receiving dialysis through the fistula without problems. Though recently at the dialysis center they have noticed swelling of the right upper extremity in the hand and the entire arm. Fistula has been accessed and used successfully without problems, though she was referred for swelling to assess for any venous thrombus. Patient does admit to the swelling, though denies any pain or discomfort.     Family History   Problem Relation Age of Onset   Chiaraetta Nipper Cancer Mother 76        survived   Goldy Nipper Breast Cancer Mother     Hypertension Father     Diabetes Sister     Mental Illness Sister     Colon Cancer Neg Hx        Past Surgical History:   Procedure Laterality Date     SECTION      x1    COLONOSCOPY  2014    Dr. Zoe Rodriguez      x1 Dr. Shamir Medrano, Dr Nereida Davison 2018   Fracisco Shield  08/10/2021    [facility]    DIAGNOSTIC CARDIAC CATH LAB PROCEDURE  10/02/2019    HYSTERECTOMY, TOTAL ABDOMINAL      one ovary intact, Dr Elizabeth Church, menorrhagia    TX TOTAL KNEE ARTHROPLASTY Left 2018    LEFT KNEE TOTAL KNEE ARTHROPLASTY, SHAYNA, NERVE BLOCK performed by Charla Ott MD at [address] [place] [address] [facility]      TOE AMPUTATION Right     TOTAL KNEE ARTHROPLASTY  16    Dr Parisa Alfaro TUN[address]ELED [address] Right 2020    tunneled HD catheter per Dr Katerina Min        Past Medical History:   Diagnosis Date    Angina at rest [facility]) 2020    Anxiety     CAD S/P percutaneous coronary angioplasty ,     stents per dr Swathi Luu    CHF [facility]congestive heart failure) [facility]     CKD (chronic kidney disease) stage 4, GFR 15-29 ml/min ([address] 2/24/2018    CKD stage 4 due to type 2 diabetes mellitus ([address]     Contusion of right chest wall 2/16/2021    COPD (chronic obstructive pulmonary disease) (HCC)     Diabetic nephropathy with proteinuria ([address] 2014    DJD (degenerative joint disease) of knee     Dr Harpal Samuel GERD (gastroesophageal reflux disease)     Hemiparesis, left ([address] 2013    entered Assisted Living ([place])    Hemodialysis patient [facility])     Hemodialysis-associated hypotension 10/22/2021    History of heart failure     History of seizures     History of type C viral hepatitis     HTN (hypertension)     Hyperlipidemia     Impaired mobility and activities of daily living     Mediastinal lymphadenopathy 2013    Dara Camacho    Metabolic syndrome     Moderate persistent asthma without complication 9/76/0799    Need for extended care facility 7/7/2021    Neurogenic urinary incontinence 2013    Neuropathy in diabetes [facility])     Nonrheumatic mitral valve regurgitation 7/7/2021    Nonrheumatic tricuspid valve regurgitation 7/7/2021    Obesity (BMI 30-39. 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)     Type 2 diabetes mellitus with renal manifestations, controlled ([address] 2015    Insulin dependent, Dr Gregory Lawson    Uncontrolled type 2 diabetes mellitus with hyperglycemia ([address]     Urinary incontinence due to cognitive impairment 2013    Vitamin D deficiency 2014       Social History     Socioeconomic History    Marital status:      Spouse name: Not on file    Number of children: 2    Years of education: Not on file    Highest education level: Not on file   Occupational History  Occupation: disabled   Tobacco Use    Smoking status: Never Smoker    Smokeless tobacco: Never Used   Vaping Use    Vaping Use: Never used   Substance and Sexual Activity    Alcohol use: No     Alcohol/week: 0.0 standard drinks    Drug use: No    Sexual activity: Not Currently   Other Topics Concern    Not on file   Social History Narrative    Born in [place], one of 5    Twin sister Reyes, very ill in 2018, [place] [address]    Moved to [facility], , 2 children, one son and one daughter    Worked at [employer], as a nurse's aide    Disabled due to mental illness    Lived at [employer], was discharged, returned to independent living in 2017 in the daughter's house and has adjusted well    One son and one daughter, live in the same house with patient, Huan Salazar pays the rent    [place] [employer])        10/11/2021 [facility] updates; patient lives with her daughter son-in-law and 2 grandchildren and patient's handicapped son. Per daughter, her brother is blind, MRDD, multiple health issues. Daughter's  is patient's legal guardian. Patient has hemodialysis Tuesday Thursday and Saturday. Patient's bedroom is on main floor with a half bath. Daughter walks patient upstairs once weekly for full bath. Patient is using her walker in the home. Patient has a hospital bed in the home. Social Determinants of Health     Financial Resource Strain: Low Risk     Difficulty of Paying Living Expenses: Not hard at all   Food Insecurity: No Food Insecurity    Worried About Running Out of Food in the Last Year: Never true    Yung of Food in the Last Year: Never true   Transportation Needs: No Transportation Needs    Lack of Transportation (Medical): No    Lack of Transportation (Non-Medical):  No   Physical Activity:     Days of Exercise per Week: Not on file    Minutes of Exercise per Session: Not on file   Stress:     Feeling of Stress : Not on file   Social Connections:  Frequency of Communication with Friends and Family: Not on file    Frequency of Social Gatherings with Friends and Family: Not on file    Attends Caodaism Services: Not on file    Active Member of Clubs or Organizations: Not on file    Attends Club or Organization Meetings: Not on file    Marital Status: Not on file   Intimate Partner Violence:     Fear of Current or Ex-Partner: Not on file    Emotionally Abused: Not on file    Physically Abused: Not on file    Sexually Abused: Not on file   Housing Stability:     Unable to Pay for Housing in the Last Year: Not on file    Number of [place] in the Last Year: Not on file    Unstable Housing in the Last Year: Not on file       Allergies   Allergen Reactions    Codeine Hives     hives    Oxycontin [Oxycodone Hcl] Hives       Current Outpatient Medications on File Prior to Encounter   Medication Sig Dispense Refill    melatonin 10 MG CAPS capsule Take 1 capsule by mouth nightly 30 capsule 12    furosemide (LASIX) 20 MG tablet Take 5 tablets by mouth 2 times daily 60 tablet 3    polyethylene glycol (MIRALAX) 17 GM/SCOOP powder Take 1 packet by mouth in the morning for constipation. Hold for diarrhea. 30 each 3    ondansetron (ZOFRAN) 4 MG tablet Take 1 tablet by mouth as needed for nausea/vomiting 3 times a day as needed 60 tablet 3    midodrine (PROAMATINE) 5 MG tablet Take 1 tablet by mouth one time a day every Tue, Thu, Sat for hypotension. Give 30 mins prior to dialysis. 90 tablet 3    Handicap Placard MISC by Does not apply route Expiration in 5 years.  1 each 0    sertraline (ZOLOFT) 50 MG tablet TAKE 1 TABLET BY MOUTH DAILY 30 tablet 2    vitamin B-12 (CYANOCOBALAMIN) 100 MCG tablet TAKE 1 TABLET BY MOUTH ONCE DAILY 90 tablet 4    fluticasone (FLONASE) 50 MCG/ACT nasal spray 1 spray by Each Nostril route daily      SITagliptin (JANUVIA) 50 MG tablet Take 50 mg by mouth daily      lidocaine-prilocaine (EMLA) 2.5-2.5 % cream Apply topically as needed for Pain Apply topically as needed. 3 times a week before dialysis wrap with saran wrap      senna (SENOKOT) 8.6 MG tablet Take 1 tablet by mouth nightly      triamcinolone (KENALOG) 0.1 % cream Apply topically daily Apply topically 2 times daily.       albuterol (PROVENTIL) (2.5 MG/3ML) 0.083% nebulizer solution Take 3 mLs by nebulization every 4 hours as needed for Wheezing 120 each 3    hydrOXYzine (ATARAX) 25 MG tablet TAKE ONE TABLET BY MOUTH TWO TIMES A DAY      NYAMYC 962472 UNIT/GM powder APPLY TO AFFECTED AREA OF ABDOMINAL FOLDS EVERY 12 HOURS AS NEEDED      insulin glargine (LANTUS SOLOSTAR) 100 UNIT/ML injection pen 70 UNITS AT BEDTIME 30 pen 3    insulin aspart (NOVOLOG FLEXPEN) 100 UNIT/ML injection pen 15 UNITS PLUS SLIDING SCALE   LESS THAN 180 NONE  181-250 2 UNITS  251-300 4 UNITS  301-400 6 UNITS   401-500 10 UNITS 10 pen 3    blood glucose test strips (ONETOUCH VERIO) strip  each 3    OneTouch Delica Lancets 97N MISC  each 3    Blood Glucose Monitoring Suppl (ONETOUCH VERIO) w/Device KIT AS DIRECTED 1 kit 0    pantoprazole (PROTONIX) 20 MG tablet TAKE 1 TABLET BY MOUTH DAILY 30 tablet 11    ARIPiprazole (ABILIFY) 5 MG tablet TAKE 1 TABLET BY MOUTH ONCE DAILY 30 tablet 5    Insulin Pen Needle (SURE COMFORT PEN NEEDLES) 30G X 8 MM MISC USE AS DIRECTED FIVE TIMES A DAILY 100 each 10    b complex-C-folic acid (NEPHROCAPS) 1 MG capsule Take 1 capsule by mouth daily      LANTUS SOLOSTAR 100 UNIT/ML injection pen 55 units at bedtime 10 pen 10    metoprolol tartrate (LOPRESSOR) 25 MG tablet TAKE 1/2 TABLET BY MOUTH TWO TIMES DAILY  (Patient taking differently: Take 25 mg by mouth 2 times daily ) 90 tablet 4    insulin aspart (NOVOLOG FLEXPEN) 100 UNIT/ML injection pen INJECT 8-10  units with each meals (Patient taking differently: Inject 0-8 Units into the skin 3 times daily (before meals) And per sliding scale fo 0-150=0 units; 151-200= 2units; 201-250= 4 units; 251-300=6units; 301-350=8 units; 351-400=1Call MD/  CNP) 10 pen 3    aspirin EC 81 MG EC tablet Take 1 tablet by mouth daily 30 tablet 12    magnesium oxide (MAG-OX) 400 MG tablet Take 1 tablet by mouth daily 90 tablet 4    atorvastatin (LIPITOR) 40 MG tablet Take 1 tablet by mouth daily 90 tablet 4    blood glucose test strips (FREESTYLE LITE) strip 1 each by Does not apply route 4 times daily (before meals and nightly) As needed. 200 strip 5    Alcohol Swabs (EASY TOUCH ALCOHOL PREP MEDIUM) 70 % PADS USE AS DIRECTED THREE TIMES A  each 3    FreeStyle Lancets MISC Test 4x daily 150 each 3    docusate sodium (COLACE, DULCOLAX) 100 MG CAPS Take 100 mg by mouth 2 times daily For the prevention and treatment of constipation while taking pain medications. 30 capsule 0    acetaminophen (TYLENOL) 325 MG tablet Take 2 tablets by mouth every 4 hours as needed for Pain (For mild to moderate pain (Pain 1-6 out of 10 on pain scale)) 120 tablet 0    Blood Glucose Monitoring Suppl (FREESTYLE LITE) CORETTA 1 Device by Does not apply route daily as needed (Diabetes) Use freestyle meter to test blood sugar as needed 1 Device 0    nitroGLYCERIN (NITROSTAT) 0.4 MG SL tablet Place 1 tablet under the tongue every 5 minutes as needed for Chest pain       No current facility-administered medications on file prior to encounter. Review of Systems   Constitutional: Negative. Negative for chills, diaphoresis and fever. HENT: Negative. Negative for congestion, ear pain, hearing loss and tinnitus. Eyes: Negative. Negative for photophobia. Respiratory: Negative. Negative for cough, shortness of breath and wheezing. Cardiovascular: Negative. Negative for chest pain, palpitations and leg swelling. Gastrointestinal: Negative. Negative for abdominal pain, constipation, diarrhea, nausea and vomiting. Genitourinary: Negative. Negative for dysuria, flank pain, frequency, hematuria and urgency. Musculoskeletal: Positive for joint swelling. Negative for back pain and neck pain. Skin: Negative. Negative for rash. Allergic/Immunologic: Negative for environmental allergies. Neurological: Negative. Negative for dizziness, tremors, weakness and headaches. Hematological: Does not bruise/bleed easily. Psychiatric/Behavioral: Negative. Negative for hallucinations and suicidal ideas. The patient is not nervous/anxious. OBJECTIVE:  BP (!) 168/74   Pulse 86   Resp 16   LMP  (LMP Unknown)   SpO2 95%     Physical Exam  Constitutional:       General: She is not in acute distress. Appearance: She is well-developed. She is not diaphoretic. HENT:      Head: Normocephalic and atraumatic. Nose: Nose normal.      Mouth/Throat:      Pharynx: No oropharyngeal exudate. Eyes:      General: No scleral icterus. Right eye: No discharge. Left eye: No discharge. Conjunctiva/sclera: Conjunctivae normal.   Neck:      Thyroid: No thyromegaly. Vascular: No JVD. Trachea: No tracheal deviation. Cardiovascular:      Rate and Rhythm: Normal rate and regular rhythm. Heart sounds: Normal heart sounds. No murmur heard. No friction rub. No gallop. Pulmonary:      Effort: No respiratory distress. Breath sounds: No stridor. No wheezing or rales. Chest:      Chest wall: No tenderness. Abdominal:      General: Bowel sounds are normal. There is no distension. Palpations: Abdomen is soft. There is no mass. Tenderness: There is no abdominal tenderness. There is no guarding or rebound. Hernia: No hernia is present. Musculoskeletal:         General: No tenderness or deformity. Arms:       Cervical back: Neck supple. Comments: Swelling of right arm and hand. Strong palpable and audible thrill in right arm fistula. Skin:     General: Skin is dry. Coloration: Skin is not pale. Findings: No erythema or rash.    Neurological: Mental Status: She is alert and oriented to person, place, and time. Cranial Nerves: No cranial nerve deficit. Psychiatric:         Behavior: Behavior normal.         Thought Content: Thought content normal.         Judgment: Judgment normal.           ASSESSMENT ANDPLAN:    Assessment: Patient with right upper extremity brachiocephalic dialysis fistula created 2 years ago. Fistula is being used successfully without problems. There has been interval swelling of the right hand and arm. This might represent either lymphatic problem or venous occlusive disease which may be related to fistula. Plan: Ultrasound examination of the right upper extremity vasculature and fistulogram to evaluate for any occlusion or thrombus.     Marquis Horton MD, [facility]

## 2022-03-18 NOTE — OR NURSING
Sedation Administered    1111 Patient assisted to IR table in supine position. Consent verified for Fistulogram with intervention if needed. 5 Dr. Bernadette Markham present and speaking with patient. All questions answered before proceeding. Dr. Bernadette Markham evaluated right arm fistula using Ultrasound guidance. 1134 Right arm cleaned generously with Chloroprep. 1141 Timeout performed     1142 Versed 0.5 mg IV and Fentanyl 50 mcg IV sedation administered. [phone] Patient draped using full body drape. 1150 Dr. Bernadette Markham administered Lidocaine 2 % at area of fistula to numb the skin. Fistula was accessed using Ultrasound guidance and a 5 fr MP set. 1153 A mixture of saline and contrast were injected and fluoro images taken to visualize flow of contrast through the fistula. 1156 A mixture of saline and contrast were injected and fluoro images taken to visualize flow of contrast through the fistula while pt instructed to hold breath by Dr Bernadette Markham. Patient followed instructions and tolerated well. 1158 Heparing 4000 units IV given     1159 7 Fr brite tip sheath placed  over Bentson (0.035 x 150 cm) wire. Patient tolerating procedure well.    1202 Berenstein 5F x 65cm catheter placed. [address] removed and Amplatz Super Stiff Straight tip guidewire 0.035\" x 260 cm inserted. 1206 Conquest 7 F PTA Dilation catheter ( 10mm  by 40mm by 75cm) used with indeflator for intervals of one minute inflation and then deflation. 1211 Additional hand injection of contrast given by Dr Bernadette Markham as patient instructed to hold breath. Patient did as directed and tolerated well. PTA catheter and Bentson wire removed. Final images taken. Sheath removed. Purse string sutures placed by Dr. Bernadette Markham. Guaze and tegaderm dressing applied. No bleeding noted. Procedure complete. Patient tolerated well, no ectopy on monitor. VSS. Patient denies complaints at this time.     1226 Patient assisted onto cart and taken to CT holding room for recovery and discharge.     Total Sedation Given:  Versed:   0.5 mg                                          Fentanyl:   50 mcg    Total Contrast Used :    100 ml

## 2022-03-18 NOTE — PRE SEDATION
Sedation Pre-Procedure Note    Patient Name: Daneil Olszewski   YOB: 1960  Room/Bed: Room/bed info not found  Medical Record Number: [id number]  Date: 3/18/2022   Time: 11:21 AM       Indication:  Fistulogram    Consent: I have discussed with the patient and/or the patient representative the indication, alternatives, and the possible risks and/or complications of the planned procedure and the anesthesia methods. The patient and/or patient representative appear to understand and agree to proceed.     Vital Signs:   Vitals:    03/18/22 1005   BP: (!) 168/74   Pulse: 86   Resp: 16   SpO2: 95%       Past Medical History:   has a past medical history of Angina at rest [facility]), Anxiety, CAD S/P percutaneous coronary angioplasty, CHF (congestive heart failure) ([address] CKD (chronic kidney disease) stage 4, GFR 15-29 ml/min ([facility]), CKD stage 4 due to type 2 diabetes mellitus ([address] Contusion of right chest wall, COPD (chronic obstructive pulmonary disease) ([address] Diabetic nephropathy with proteinuria ([address] DJD (degenerative joint disease) of knee, GERD (gastroesophageal reflux disease), Hemiparesis, left ([address] Hemodialysis patient ([address] Hemodialysis-associated hypotension, History of heart failure, History of seizures, History of type C viral hepatitis, HTN (hypertension), Hyperlipidemia, Impaired mobility and activities of daily living, Mediastinal lymphadenopathy, Metabolic syndrome, Moderate persistent asthma without complication, Need for extended care facility, Neurogenic urinary incontinence, Neuropathy in diabetes ([address] Nonrheumatic mitral valve regurgitation, Nonrheumatic tricuspid valve regurgitation, Obesity (BMI 30-39.9), Recurrent UTI, S/P colonoscopy, Schizophrenia, paranoid, chronic ([facility]), Small vessel disease, cerebrovascular, Status post total knee replacement, right, Status post total left knee replacement, Thrush, 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 Uncontrolled type 2 diabetes mellitus with hyperglycemia ([facility] [facility] Urinary incontinence due to cognitive impairment, and Vitamin D deficiency. Past Surgical History:   has a past surgical history that includes Hysterectomy, total abdominal; Toe amputation (Right); Coronary angioplasty with stent; Colonoscopy (2014);  section; Total knee arthroplasty (16); pr total knee arthroplasty (Left, 2018); Diagnostic Cardiac Cath Lab Procedure (10/02/2019); Tunneled venous catheter placement (Right, 2020); Percutaneous Transluminal Coronary Angio; and Coronary angioplasty with stent (08/10/2021). Medications:   Scheduled Meds:   Continuous Infusions:   PRN Meds:   Home Meds:   Prior to Admission medications    Medication Sig Start Date End Date Taking? Authorizing Provider   melatonin 10 MG CAPS capsule Take 1 capsule by mouth nightly 3/16/22   Jayro Garvey MD   furosemide (LASIX) 20 MG tablet Take 5 tablets by mouth 2 times daily 3/16/22   Jayro Garvey MD   polyethylene glycol (MIRALAX) 17 GM/SCOOP powder Take 1 packet by mouth in the morning for constipation. Hold for diarrhea. 3/16/22   Jayro Garvey MD   ondansetron (ZOFRAN) 4 MG tablet Take 1 tablet by mouth as needed for nausea/vomiting 3 times a day as needed 3/16/22   Jayro Garvey MD   midodrine (PROAMATINE) 5 MG tablet Take 1 tablet by mouth one time a day every e, Th, Sat for hypotension. Give 30 mins prior to dialysis. 3/16/22   Jayro Garvey MD   Handicap Placard MISC by Does not apply route Expiration in 5 years.  3/16/22   Jayro Garvey MD   sertraline (ZOLOFT) 50 MG tablet TAKE 1 TABLET BY MOUTH DAILY 3/1/22   Lee Baldwin MD   vitamin B-12 (CYANOCOBALAMIN) 100 MCG tablet TAKE 1 TABLET BY MOUTH ONCE DAILY 22   Lee Baldwin MD   fluticasone (FLONASE) 50 MCG/ACT nasal spray 1 spray by Each Nostril route daily    Historical Provider, MD   SITagliptin (JANUVIA) 50 MG tablet Take 50 mg by mouth daily    Historical Provider, MD   lidocaine-prilocaine (EMLA) 2.5-2.5 % cream Apply topically as needed for Pain Apply topically as needed. 3 times a week before dialysis wrap with stella álvarezap    Historical Provider, MD   senna (SENOKOT) 8.6 MG tablet Take 1 tablet by mouth nightly    Historical Provider, MD   triamcinolone (KENALOG) 0.1 % cream Apply topically daily Apply topically 2 times daily.     Historical Provider, MD   albuterol (PROVENTIL) (2.5 MG/3ML) 0.083% nebulizer solution Take 3 mLs by nebulization every 4 hours as needed for Wheezing 12/22/21   Jameel Wiggins MD   hydrOXYzine (ATARAX) 25 MG tablet TAKE ONE TABLET BY MOUTH TWO TIMES A DAY 11/13/21   Historical Provider, MD   [id number] [id number] UNIT/GM powder APPLY TO AFFECTED AREA OF ABDOMINAL FOLDS EVERY 12 HOURS AS NEEDED 11/12/21   Historical Provider, MD   insulin glargine (LANTUS SOLOSTAR) 100 UNIT/ML injection pen 70 UNITS AT BEDTIME 12/13/21   Adenike Amezquita MD   insulin aspart (NOVOLOG FLEXPEN) 100 UNIT/ML injection pen 15 UNITS PLUS SLIDING SCALE   LESS THAN 180 NONE  181-250 2 UNITS  251-300 4 UNITS  301-400 6 UNITS   401-500 10 UNITS 12/13/21   Adenike Amezquita MD   blood glucose test strips (ONETOUCH VERIO) strip QID 12/13/21   Adenike Amezquita MD   OneTouch Delica Lancets 12D MISC QID 12/13/21   Adenike Amezquita MD   Blood Glucose Monitoring Suppl (ONETOUCH VERIO) w/Device KIT AS DIRECTED 12/13/21   Adenike Amezquita MD   pantoprazole (PROTONIX) 20 MG tablet TAKE 1 TABLET BY MOUTH DAILY 10/26/21   Umair Rodriguez MD   ARIPiprazole (ABILIFY) 5 MG tablet TAKE 1 TABLET BY MOUTH ONCE DAILY 10/22/21   Vanessa Guerra MD   Insulin Pen Needle (SURE COMFORT PEN NEEDLES) 30G X 8 MM MISC USE AS DIRECTED FIVE TIMES A DAILY 10/20/21   Adenike Amezquita MD   b complex-C-folic acid (NEPHROCAPS) 1 MG capsule Take 1 capsule by mouth daily    Historical Provider, MD   LANTUS SOLOSTAR 100 UNIT/ML injection pen 55 units at bedtime 10/8/21   Rod Bai MD   metoprolol tartrate (LOPRESSOR) 25 MG tablet TAKE 1/2 TABLET BY MOUTH TWO TIMES DAILY   Patient taking differently: Take 25 mg by mouth 2 times daily  9/17/21   Drucilla Harada, MD   insulin aspart (NOVOLOG FLEXPEN) 100 UNIT/ML injection pen INJECT 8-10  units with each meals  Patient taking differently: Inject 0-8 Units into the skin 3 times daily (before meals) And per sliding scale fo 0-150=0 units; 151-200= 2units; 201-250= 4 units; 251-300=6units; 301-350=8 units; 351-400=1Call MD/  CNP 6/28/21   Rod Bai MD   aspirin EC 81 MG EC tablet Take 1 tablet by mouth daily 5/25/21   Drucilla Harada, MD   magnesium oxide (MAG-OX) 400 MG tablet Take 1 tablet by mouth daily 4/28/21   Drucilla Harada, MD   atorvastatin (LIPITOR) 40 MG tablet Take 1 tablet by mouth daily 4/28/21   Drucilla Harada, MD   blood glucose test strips (FREESTYLE LITE) strip 1 each by Does not apply route 4 times daily (before meals and nightly) As needed. 3/12/21   MARCELO Lozoya   Alcohol Swabs (EASY TOUCH ALCOHOL PREP MEDIUM) 70 % PADS USE AS DIRECTED THREE TIMES A DAY 3/12/21   MARCELO Lozoya   FreeStyle Lancets MISC Test 4x daily 3/11/21   MARCELO Lozoya   docusate sodium (COLACE, DULCOLAX) 100 MG CAPS Take 100 mg by mouth 2 times daily For the prevention and treatment of constipation while taking pain medications.  2/24/21   Lena Dixon PA-C   acetaminophen (TYLENOL) 325 MG tablet Take 2 tablets by mouth every 4 hours as needed for Pain (For mild to moderate pain (Pain 1-6 out of 10 on pain scale)) 2/24/21   Lena Dixon PA-C   Blood Glucose Monitoring Suppl (FREESTYLE LITE) CORETTA 1 Device by Does not apply route daily as needed (Diabetes) Use freestyle meter to test blood sugar as needed 12/29/20   MARCELO Lozoya   nitroGLYCERIN (NITROSTAT) 0.4 MG SL tablet Place 1 tablet under the tongue every 5 minutes as needed for Chest pain 9/22/15   Historical Provider, MD     Coumadin Use Last 7 Days:  no  Antiplatelet drug therapy use last 7 days: no  Other anticoagulant use last 7 days: no  Additional Medication Information:  none      Pre-Sedation Documentation and Exam:   I have personally completed a history, physical exam & review of systems for this patient (see notes).     Mallampati Airway Assessment:  Mallampati Class II - (soft palate, fauces & uvula are visible)    Prior History of Anesthesia Complications:   none    ASA Classification:  Class 2 - A normal healthy patient with mild systemic disease    Sedation/ Anesthesia Plan:   intravenous sedation    Medications Planned:   midazolam (Versed) intravenously and fentanyl intravenously    Patient is an appropriate candidate for plan of sedation: yes    Electronically signed by Pedro Ruelas MD on 3/18/2022 at 11:21 AM

## 2022-03-18 NOTE — FLOWSHEET NOTE
1005 - Pt back to CT holding, ambulatory, steady independent gait with rollator walker. Settled onto cart and assisted into gown. VS & tele monitors applied. VSS, on RA. NSR on monitor. Allergies, home medications and history reviewed. IV inserted by NEGRITA, RN #22 left upper arm. Pt tolerated well. Pt reports being NPO since yesterday, hasn't taken her diabetic meds or lopressor this AM as instructed. 1018 - Accucheck obtained = 263.     1045 - Dr. Nadia Petersen at cart side to speak with patient and sign consent. 1105 - To specials via cart.  Electronically signed by Ata Olsen RN on 3/18/2022 at 11:05 AM

## 2022-03-21 NOTE — PROGRESS NOTES
Post-Discharge Transitional Care  Follow Up      Jason Randolph   YOB: 1960    Date of Office Visit:  3/16/2022  Date of Hospital Admission: 1/12/22  Date of Hospital Discharge: 1/15/22  Risk of hospital readmission (high >=14%. Medium >=10%) :Readmission Risk Score: 25.5 ( )      Care management risk score Rising risk (score 2-5) and Complex Care (Scores >=6): 17     Non face to face  following discharge, date last encounter closed (first attempt may have been earlier): 3/10/2022  9:54 AM    Call initiated 2 business days of discharge: Yes    ASSESSMENT/PLAN:     NSTEMI        -     S/p urgent LIMA to LAD CABG and Tricuspid Valvuloplasty on 1/21/22.        -     Reports no recurrent anginal episodes        -     H/o CAD s/p multiple PCIs with recent PTCA to LAD (DOS 8/10/21) and PTCA and llithotripsy to mid LAD c/b perforation (8/12/21). -      Continue Lopressor 25mg bid        -      Continue ASA and stain therapy        -      Ensure close f/u with Cardiology    Chronic diastolic congestive heart failure ([facility]        -     Not in decompensation        -     FL DISCHARGE MEDS RECONCILED W/ CURRENT OUTPATIENT MED LIST  -     Continue furosemide (LASIX) 20 MG tablet; Take 5 tablets by mouth 2 times daily, Disp-60 tablet, R-3Normal        -      Continue Lopressor 25mg bid  -     Handicap Placard MISC; Starting Wed 3/16/2022, Disp-1 each, R-0, PrintExpiration in 5 years. -     Ensure f/u with Cardiology. Psychophysiological insomnia  -     melatonin 10 MG CAPS capsule; Take 1 capsule by mouth nightly, Disp-30 capsule, R-12Normal. Requesting refills. Constipation, unspecified constipation type  -     polyethylene glycol (MIRALAX) 17 GM/SCOOP powder; Take 1 packet by mouth in the morning for constipation.  Hold for diarrhea., Disp-30 each, R-3Normal  -     FL DISCHARGE MEDS RECONCILED W/ CURRENT OUTPATIENT MED LIST    Hypotension due to hypovolemia        -     Associated with HD; BP currently stable. -     On Midodrine with HD; requesting refill.  -     midodrine (PROAMATINE) 5 MG tablet; Take 1 tablet by mouth one time a day every Tue, Thu, Sat for hypotension. Give 30 mins prior to dialysis. , Disp-90 tablet, R-3Normal  -     OR DISCHARGE MEDS RECONCILED W/ CURRENT OUTPATIENT MED LIST    End stage renal disease ([facility] [address]        -     Ongoing scheduled HD        -     Does not appear to be in fluid overload. -     ondansetron (ZOFRAN) 4 MG tablet; Take 1 tablet by mouth as needed for nausea/vomiting 3 times a day as needed, Disp-60 tablet, R-3Normal  -     OR DISCHARGE MEDS RECONCILED W/ CURRENT OUTPATIENT MED LIST    Moderate persistent asthma without complication        -     Not in exacerbation        -     Continue on neb albuterol q4 prn  -     Handicap Placard MISC; Starting Wed 3/16/2022, Disp-1 each, R-0, PrintExpiration in 5 years.  -     OR DISCHARGE MEDS RECONCILED W/ CURRENT OUTPATIENT MED LIST    Difficulty in walking  -     Handicap Placard MISC; Starting Wed 3/16/2022, Disp-1 each, R-0, PrintExpiration in 5 years. Medical Decision Making: moderate complexity  Return in 3 months (on 6/16/2022). On this date 3/16/2022 I have spent 60 minutes reviewing previous notes, test results and face to face with the patient discussing the diagnosis and importance of compliance with the treatment plan as well as documenting on the day of the visit. Subjective:   HPI:  Follow up of Hospital problems/diagnosis(es): NSTEMI with critical stenosis of LAD. Inpatient course: Discharge summary reviewed- see chart. Patient is a 54yo Female with a PMH of CAD s/p multiple PCIs with recent PTCA to LAD (DOS 8/10/21) and PTCA and llithotripsy to mid LAD c/b perforation (8/12/21). History also notable for DM2, HTN, HLD, ESRD-on-HD (TTS), COPD, Seizures and Schizophrenia. Patient was initially admitted to [facility] on 1/12 with chest pain.  Emergent LHC demonstrated recurrent LAD in-stent thrombosis. Patient was stabilized and subsequently transferred to [facility] on 1/15 for further evaluation and treatment. Hospital course at [facility] was complicated by an NSTEMI with critical stenosis of the LAD. She underwent an urgent LIMA to LAD CABG and Tricuspid Valvuloplasty on 1/21/22. Course was also complicated by development of a Pericardial Effusion for which patient underwent a subxiphoid drain placement on 2/5/21. Drain was removed after 24 hours following effective drainage. Bilateral pleural effusion was also noted on imaging during hospital stay. Cardiothoracic Surgery was consulted but thoracentesis was not indicated in view of noted volume. Patient improved clinically with optimization of cardiac medications. She was deemed clinically stable thereafter and discharged to a SNF for acute rehabilitation. Interval history/Current status:    Patient reports a stable post-discharge course. Reports no recurrent episodes of chest pain or exacerbation of shortness of breath. She has an upcoming follow up appointment with Cardiology scheduled for 3/24/22. No new medical complaints offered today. Patient requesting medication refills.       Patient Active Problem List   Diagnosis    Atherosclerotic heart disease of native coronary artery with unspecified angina pectoris (HCC)    Schizophrenia, paranoid, chronic    Metabolic syndrome    Vitamin B 12 deficiency    Cerebral microvascular disease    Mixed hyperlipidemia    Other hammer toe (acquired)    Vitamin D insufficiency    Incontinence of urine    Diabetic nephropathy with proteinuria ([address]    Essential (primary) hypertension    History of type C viral hepatitis    Urinary incontinence due to cognitive impairment    History of seizures    Stented coronary artery-plan is to stay on Plavix indefinately per Dr Larry Foley Other specified diabetes mellitus with diabetic neuropathy, unspecified ([address]    Controlled type 2 diabetes mellitus with diabetic neuropathy, with long-term current use of insulin ([facility])    Hemiparesis, left ([facility])    Angina, class II ([facility])    Pain, unspecified    Tardive dyskinesia    Shortness of breath    Chronic diastolic congestive heart failure ([facility])    Sleep apnea, unspecified    Pulmonary hypertension, unspecified ([facility])    Class 2 severe obesity with serious comorbidity and body mass index (BMI) of 36.0 to 36.9 in adult [facility])    Edema    Closed supracondylar fracture of right humerus    Other chronic pain    Palliative care patient    Recurrent falls    Renal failure    Difficulty in walking    ESRD (end stage renal disease) on dialysis ([facility])    Weakness    Other seizures ([facility])    Moderate persistent asthma without complication    CXHLB-16    Post PTCA    Falls frequently    Chest wall contusion, left, initial encounter    Headache, unspecified    Paranoid schizophrenia ([address]    Compression fracture of spine ([facility])    Closed rib fracture    Depression    Chronic obstructive pulmonary disease ([facility])    Critical illness polyneuropathy ([address]    Multiple closed fractures of ribs of right side    Nonrheumatic mitral valve regurgitation    Nonrheumatic tricuspid valve regurgitation    Need for extended care facility    Chronic pain of both shoulders    Hemodialysis-associated hypotension    Anginal chest pain at rest [facility])    Chest pain    Unstable angina ([facility])       Medications listed as ordered at the time of discharge from hospital  [unfilled]      Medications marked \"taking\" at this time  Outpatient Medications Marked as Taking for the 3/16/22 encounter (Office Visit) with Libra Camejo MD   Medication Sig Dispense Refill    melatonin 10 MG CAPS capsule Take 1 capsule by mouth nightly 30 capsule 12    furosemide (LASIX) 20 MG tablet Take 5 tablets by mouth 2 times daily 60 tablet 3    polyethylene glycol (MIRALAX) 17 GM/SCOOP powder Take 1 packet by mouth in the morning for constipation. Hold for diarrhea. 30 each 3    ondansetron (ZOFRAN) 4 MG tablet Take 1 tablet by mouth as needed for nausea/vomiting 3 times a day as needed 60 tablet 3    midodrine (PROAMATINE) 5 MG tablet Take 1 tablet by mouth one time a day every Tue, Thu, Sat for hypotension. Give 30 mins prior to dialysis. 90 tablet 3    Handicap Placard MISC by Does not apply route Expiration in 5 years. 1 each 0    sertraline (ZOLOFT) 50 MG tablet TAKE 1 TABLET BY MOUTH DAILY 30 tablet 2    vitamin B-12 (CYANOCOBALAMIN) 100 MCG tablet TAKE 1 TABLET BY MOUTH ONCE DAILY 90 tablet 4    fluticasone (FLONASE) 50 MCG/ACT nasal spray 1 spray by Each Nostril route daily      SITagliptin (JANUVIA) 50 MG tablet Take 50 mg by mouth daily      lidocaine-prilocaine (EMLA) 2.5-2.5 % cream Apply topically as needed for Pain Apply topically as needed. 3 times a week before dialysis wrap with saran wrap      senna (SENOKOT) 8.6 MG tablet Take 1 tablet by mouth nightly      triamcinolone (KENALOG) 0.1 % cream Apply topically daily Apply topically 2 times daily.       albuterol (PROVENTIL) (2.5 MG/3ML) 0.083% nebulizer solution Take 3 mLs by nebulization every 4 hours as needed for Wheezing 120 each 3    hydrOXYzine (ATARAX) 25 MG tablet TAKE ONE TABLET BY MOUTH TWO TIMES A DAY      NYAMYC [id number] UNIT/GM powder APPLY TO AFFECTED AREA OF ABDOMINAL FOLDS EVERY 12 HOURS AS NEEDED      insulin glargine (LANTUS SOLOSTAR) 100 UNIT/ML injection pen 70 UNITS AT BEDTIME 30 pen 3    insulin aspart (NOVOLOG FLEXPEN) 100 UNIT/ML injection pen 15 UNITS PLUS SLIDING SCALE   LESS THAN 180 NONE  181-250 2 UNITS  251-300 4 UNITS  301-400 6 UNITS   401-500 10 UNITS 10 pen 3    blood glucose test strips (ONETOUCH VERIO) strip  each 3    OneTouch Delica Lancets 77D MISC  each 3    Blood Glucose Monitoring Suppl (ONETOUCH VERIO) w/Device KIT AS DIRECTED 1 kit 0    [DISCONTINUED] isosorbide mononitrate (IMDUR) 30 MG extended release tablet TAKE FOUR (4) TABLETS BY MOUTH EVERY DAY      pantoprazole (PROTONIX) 20 MG tablet TAKE 1 TABLET BY MOUTH DAILY 30 tablet 11    [DISCONTINUED] BRILINTA 90 MG TABS tablet TAKE ONE(1) TABLET TWICE DAILY 60 tablet 11    ARIPiprazole (ABILIFY) 5 MG tablet TAKE 1 TABLET BY MOUTH ONCE DAILY 30 tablet 5    Insulin Pen Needle (SURE COMFORT PEN NEEDLES) 30G X 8 MM MISC USE AS DIRECTED FIVE TIMES A DAILY 100 each 10    [DISCONTINUED] ranolazine (RANEXA) 500 MG extended release tablet TAKE 1 TABLET BY MOUTH TWICE DAILY 60 tablet 11    b complex-C-folic acid (NEPHROCAPS) 1 MG capsule Take 1 capsule by mouth daily      LANTUS SOLOSTAR 100 UNIT/ML injection pen 55 units at bedtime 10 pen 10    [] zoster recombinant adjuvanted vaccine (SHINGRIX) 50 MCG/0.5ML SUSR injection Inject 0.5 mLs into the muscle See Admin Instructions 1 dose now and repeat in 2-6 months 0.5 mL 0    metoprolol tartrate (LOPRESSOR) 25 MG tablet TAKE 1/2 TABLET BY MOUTH TWO TIMES DAILY  (Patient taking differently: Take 25 mg by mouth 2 times daily ) 90 tablet 4    insulin aspart (NOVOLOG FLEXPEN) 100 UNIT/ML injection pen INJECT 8-10  units with each meals (Patient taking differently: Inject 0-8 Units into the skin 3 times daily (before meals) And per sliding scale fo 0-150=0 units; 151-200= 2units; 201-250= 4 units; 251-300=6units; 301-350=8 units; 351-400=1Call MD/  CNP) 10 pen 3    aspirin EC 81 MG EC tablet Take 1 tablet by mouth daily 30 tablet 12    magnesium oxide (MAG-OX) 400 MG tablet Take 1 tablet by mouth daily 90 tablet 4    atorvastatin (LIPITOR) 40 MG tablet Take 1 tablet by mouth daily 90 tablet 4    blood glucose test strips (FREESTYLE LITE) strip 1 each by Does not apply route 4 times daily (before meals and nightly) As needed.  200 strip 5    Alcohol Swabs (EASY TOUCH ALCOHOL PREP MEDIUM) 70 % PADS USE AS DIRECTED THREE TIMES A  each 3    FreeStyle Lancets MISC Test 4x daily 150 each 3  docusate sodium (COLACE, DULCOLAX) 100 MG CAPS Take 100 mg by mouth 2 times daily For the prevention and treatment of constipation while taking pain medications. 30 capsule 0    acetaminophen (TYLENOL) 325 MG tablet Take 2 tablets by mouth every 4 hours as needed for Pain (For mild to moderate pain (Pain 1-6 out of 10 on pain scale)) 120 tablet 0    Blood Glucose Monitoring Suppl (FREESTYLE LITE) CORETTA 1 Device by Does not apply route daily as needed (Diabetes) Use freestyle meter to test blood sugar as needed 1 Device 0    nitroGLYCERIN (NITROSTAT) 0.4 MG SL tablet Place 1 tablet under the tongue every 5 minutes as needed for Chest pain          Medications patient taking as of now reconciled against medications ordered at time of hospital discharge: Yes  Review of Systems   Constitutional: Negative for activity change, chills, diaphoresis, fatigue and fever. Respiratory: Negative for cough, chest tightness and wheezing. Cardiovascular: Negative for chest pain, palpitations and leg swelling. Gastrointestinal: Negative for nausea and vomiting. Neurological: Negative for dizziness, syncope, light-headedness and headaches. Objective:    /64   Pulse 74   Temp 97.3 °F (36.3 °C) (Temporal)   Ht 5' 7\" (1.702 m)   Wt 234 lb (106.1 kg)   LMP  (LMP Unknown)   SpO2 95%   BMI 36.65 kg/m²   Physical Exam  Constitutional:       General: She is not in acute distress. Appearance: Normal appearance. She is normal weight. She is not diaphoretic. HENT:      Head: Normocephalic and atraumatic. Cardiovascular:      Rate and Rhythm: Normal rate and regular rhythm. Pulses: Normal pulses. Heart sounds: Normal heart sounds. No murmur heard. No friction rub. Pulmonary:      Effort: Pulmonary effort is normal. No respiratory distress. Breath sounds: Normal breath sounds. No wheezing or rhonchi. Chest:      Chest wall: No tenderness. Abdominal:      General: Abdomen is flat.

## 2022-03-22 RX ORDER — INSULIN ASPART 100 [IU]/ML
INJECTION, SOLUTION INTRAVENOUS; SUBCUTANEOUS
Qty: 10 PEN | Refills: 3 | Status: SHIPPED | OUTPATIENT
Start: 2022-03-22 | End: 2022-05-06 | Stop reason: SDUPTHER

## 2022-03-22 NOTE — TELEPHONE ENCOUNTER
patient requesting medication refill.  Please approve or deny this request.    Rx requested:  Requested Prescriptions     Pending Prescriptions Disp Refills    insulin aspart (NOVOLOG FLEXPEN) 100 UNIT/ML injection pen 10 pen 3     Sig: 15 UNITS PLUS SLIDING SCALE   LESS THAN 180 NONE  181-250 2 UNITS  251-300 4 UNITS  301-400 6 UNITS   401-500 10 UNITS         Last Office Visit:   12/13/2021      Next Visit Date:  Future Appointments   Date Time Provider Aminata Quinterosi   3/24/2022  4:15 PM Socrates Salazar MD [facility]   5/13/2022 10:30 AM Maria Esther Cain MD [address] [place] [address]

## 2022-03-24 ENCOUNTER — OFFICE VISIT (OUTPATIENT)
Dept: CARDIOLOGY CLINIC | Age: 62
End: 2022-03-24
Payer: MEDICARE

## 2022-03-24 VITALS
BODY MASS INDEX: 34.37 KG/M2 | HEIGHT: 67 IN | WEIGHT: 219 LBS | OXYGEN SATURATION: 98 % | SYSTOLIC BLOOD PRESSURE: 120 MMHG | RESPIRATION RATE: 18 BRPM | HEART RATE: 64 BPM | DIASTOLIC BLOOD PRESSURE: 62 MMHG

## 2022-03-24 DIAGNOSIS — I31.39 PERICARDIAL EFFUSION: ICD-10-CM

## 2022-03-24 DIAGNOSIS — I25.119 CORONARY ARTERY DISEASE INVOLVING NATIVE CORONARY ARTERY OF NATIVE HEART WITH ANGINA PECTORIS (HCC): ICD-10-CM

## 2022-03-24 DIAGNOSIS — E78.2 MIXED HYPERLIPIDEMIA: ICD-10-CM

## 2022-03-24 DIAGNOSIS — I95.3 HEMODIALYSIS-ASSOCIATED HYPOTENSION: ICD-10-CM

## 2022-03-24 DIAGNOSIS — Z95.1 S/P SINGLE VESSEL CORONARY ARTERY BYPASS: ICD-10-CM

## 2022-03-24 DIAGNOSIS — I10 HYPERTENSION, UNSPECIFIED TYPE: ICD-10-CM

## 2022-03-24 DIAGNOSIS — I10 ESSENTIAL (PRIMARY) HYPERTENSION: Primary | ICD-10-CM

## 2022-03-24 DIAGNOSIS — I25.119 CORONARY ARTERY DISEASE INVOLVING NATIVE HEART WITH ANGINA PECTORIS, UNSPECIFIED VESSEL OR LESION TYPE (HCC): ICD-10-CM

## 2022-03-24 PROCEDURE — G8417 CALC BMI ABV UP PARAM F/U: HCPCS | Performed by: INTERNAL MEDICINE

## 2022-03-24 PROCEDURE — G8427 DOCREV CUR MEDS BY ELIG CLIN: HCPCS | Performed by: INTERNAL MEDICINE

## 2022-03-24 PROCEDURE — 1036F TOBACCO NON-USER: CPT | Performed by: INTERNAL MEDICINE

## 2022-03-24 PROCEDURE — 93000 ELECTROCARDIOGRAM COMPLETE: CPT | Performed by: INTERNAL MEDICINE

## 2022-03-24 PROCEDURE — 3017F COLORECTAL CA SCREEN DOC REV: CPT | Performed by: INTERNAL MEDICINE

## 2022-03-24 PROCEDURE — G8482 FLU IMMUNIZE ORDER/ADMIN: HCPCS | Performed by: INTERNAL MEDICINE

## 2022-03-24 PROCEDURE — 99214 OFFICE O/P EST MOD 30 MIN: CPT | Performed by: INTERNAL MEDICINE

## 2022-03-24 RX ORDER — ISOSORBIDE MONONITRATE 30 MG/1
120 TABLET, EXTENDED RELEASE ORAL DAILY
COMMUNITY
End: 2022-05-04 | Stop reason: SDUPTHER

## 2022-03-24 ASSESSMENT — ENCOUNTER SYMPTOMS
EYES NEGATIVE: 1
WHEEZING: 0
SHORTNESS OF BREATH: 0
GASTROINTESTINAL NEGATIVE: 1
STRIDOR: 0
NAUSEA: 0
RESPIRATORY NEGATIVE: 1
BLOOD IN STOOL: 0
COUGH: 0
CHEST TIGHTNESS: 0

## 2022-03-24 NOTE — PROGRESS NOTES
Subsequent Progress Note  Patient: Sylvester Crook  YOB: 1960  MRN: [id number]    Chief Complaint: fall CAD HTN HPL  Chief Complaint   Patient presents with    3 Month Follow-Up    Coronary Artery Disease    Hypertension     Dr. Matteo Staley pt   CV Data:  6/2020 Echo EF 60  Mild mR  RVSP 46   7/2020 LAD DEWEY. She has prior stents as well.   8/21 Echo EF 60  8/2021 LAD recurrent ISR with double layer stents. Went to [facility] and had redo stent. 12/21 CUS B/l ICA 50-69  12/21 Spec tnegative  1/12/22 Cath LAD IST   1/2022 CABG LIMA - LAD + TV Repair complicated by Tamponade and pericardial window. HD T Th Sat  Subjective/HPI: TELEHEALTH EVALUATION -- Audio/Visual (During HHXJW-86 public health emergency)    Pt is at a nursing home now. No cp occ SOB no falls no bleed. Takes meds. Recently Plavix stopped and Brilinta added. Had recerrnet admissions for CP    10/28/2020 started HD( T Thur and Sat). Past 3 days gaine ~ 10 LBS according to her scale at home and HD. She is not short of breath and denies CP. She is here due to discrepancy in weight. 2/10/21 TELEHEALTH EVALUATION -- Audio/Visual (During NOKRI-60 public health emergency)    At [facility]. No further falls no cp some sob eats well takes meds. Will go home net week. Doing well w HD    9/10/21 after LAD stent at [facility] feels better no cp no sob no falls no bleed. 10/27/21 recent er for CP and fall. BP was low. Some parikh. No bleed. Takes meds. Often dizzy    3/24/22 had CABG + TV repairand Pericaridal window. Ws in hosp > 2 week. s weak and tired. starign to move some.      EKG: SR 78 RBBB    Nonsmoker      Past Medical History:   Diagnosis Date    Angina at rest [facility]) 5/24/2020    Anxiety     CAD S/P percutaneous coronary angioplasty 2015, 2018    stents per dr Bradley Marino    CHF (congestive heart failure) ([facility]     CKD (chronic kidney disease) stage 4, GFR 15-29 ml/min [facility] 2/24/2018    CKD stage 4 due to type 2 diabetes mellitus ([address]     Contusion of right chest wall 2021    COPD (chronic obstructive pulmonary disease) (HCC)     Diabetic nephropathy with proteinuria ([address] 2014    DJD (degenerative joint disease) of knee     Dr Jasmine Camarena GERD (gastroesophageal reflux disease)     Hemiparesis, left ([address] 2013    entered Assisted Living ([facility])    Hemodialysis patient [facility])     Hemodialysis-associated hypotension 10/22/2021    History of heart failure     History of seizures     History of type C viral hepatitis     HTN (hypertension)     Hyperlipidemia     Impaired mobility and activities of daily living     Mediastinal lymphadenopathy     Steffi Warren    Metabolic syndrome     Moderate persistent asthma without complication 1389    Need for extended care facility 2021    Neurogenic urinary incontinence 2013    Neuropathy in diabetes [facility])     Nonrheumatic mitral valve regurgitation 2021    Nonrheumatic tricuspid valve regurgitation 2021    Obesity (BMI 30-39. 9)     Recurrent UTI     S/P colonoscopy     [facility], focal active colitis    Schizophrenia, paranoid, chronic ([address]     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      x1 Dr. Alexis Armendariz, Dr Kim Hennessy  08/10/2021    [place] [facility]    DIAGNOSTIC CARDIAC CATH LAB PROCEDURE  10/02/2019    HYSTERECTOMY, TOTAL ABDOMINAL      one ovary intact, Dr Rose Burden, menorrhagia    [place] TOTAL KNEE ARTHROPLASTY Left 6/21/2018    LEFT KNEE TOTAL KNEE ARTHROPLASTY, SHAYNA, NERVE BLOCK performed by Caron Schlatter, MD at [address] [place] [address] PTCA      TOE AMPUTATION Right     TOTAL KNEE ARTHROPLASTY  05/19/16    Dr Angela Stallworth TUNNELED [address] Right 07/01/2020    tunneled HD catheter per Dr Amy Watkins       Family History   Problem Relation Age of Onset   Carol Thakur Cancer Mother 76        survived   Carol Thakur Breast Cancer Mother     Hypertension Father     Diabetes Sister     Mental Illness Sister     Colon Cancer Neg Hx        Social History     Socioeconomic History    Marital status:      Spouse name: None    Number of children: 2    Years of education: None    Highest education level: None   Occupational History    Occupation: disabled   Tobacco Use    Smoking status: Never Smoker    Smokeless tobacco: Never Used   Vaping Use    Vaping Use: Never used   Substance and Sexual Activity    Alcohol use: No     Alcohol/week: 0.0 standard drinks    Drug use: No    Sexual activity: Not Currently   Other Topics Concern    None   Social History Narrative    Born [place], one of 5    Twin sister Reyes, very ill in 2018, [place] 2019    Moved to [facility] [place], , 2 children, one son and one daughter    Worked at [employer], as a nurse's aide    Disabled due to mental illness    Lived at [employer], was discharged, returned to independent living in 2017 in the daughter's house and has adjusted well    One son and one daughter, live in the same house with patient, Roland Lambert pays the rent    [employer] reading (misteries)        10/11/2021 [facility] updates; patient lives with her daughter son-in-law and 2 grandchildren and patient's handicapped son. Per daughter, her brother is blind, MRDD, multiple health issues.   Daughter's  is patient's legal guardian. Patient has hemodialysis Tuesday Thursday and Saturday. Patient's bedroom is on main floor with a half bath. Daughter walks patient upstairs once weekly for full bath. Patient is using her walker in the home. Patient has a hospital bed in the home. Social Determinants of Health     Financial Resource Strain: Low Risk     Difficulty of Paying Living Expenses: Not hard at all   Food Insecurity: No Food Insecurity    Worried About Running Out of Food in the Last Year: Never true    Yung of Food in the Last Year: Never true   Transportation Needs: No Transportation Needs    Lack of Transportation (Medical): No    Lack of Transportation (Non-Medical):  No   Physical Activity:     Days of Exercise per Week: Not on file    Minutes of Exercise per Session: Not on file   Stress:     Feeling of Stress : Not on file   Social Connections:     Frequency of Communication with Friends and Family: Not on file    Frequency of Social Gatherings with Friends and Family: Not on file    Attends [employer] Services: Not on file    Active Member of [address] [place] [address] or Organizations: Not on file    Attends Club or Organization Meetings: Not on file    Marital Status: Not on file   Intimate Partner Violence:     Fear of Current or Ex-Partner: Not on file    Emotionally Abused: Not on file    Physically Abused: Not on file    Sexually Abused: Not on file   Housing Stability:     Unable to Pay for Housing in the Last Year: Not on file    Number of [place] in the Last Year: Not on file    Unstable Housing in the Last Year: Not on file       Allergies   Allergen Reactions    Codeine Hives     hives    Oxycontin [Oxycodone Hcl] Hives       Current Outpatient Medications   Medication Sig Dispense Refill    isosorbide mononitrate (IMDUR) 30 MG extended release tablet Take 120 mg by mouth daily      ticagrelor (BRILINTA) 90 MG TABS tablet Take 90 mg by mouth 2 times daily      insulin aspart (NOVOLOG FLEXPEN) 100 UNIT/ML injection pen 15 UNITS PLUS SLIDING SCALE   LESS THAN 180 NONE  181-250 2 UNITS  251-300 4 UNITS  301-400 6 UNITS   401-500 10 UNITS 10 pen 3    melatonin 10 MG CAPS capsule Take 1 capsule by mouth nightly 30 capsule 12    furosemide (LASIX) 20 MG tablet Take 5 tablets by mouth 2 times daily 60 tablet 3    polyethylene glycol (MIRALAX) 17 GM/SCOOP powder Take 1 packet by mouth in the morning for constipation. Hold for diarrhea. 30 each 3    ondansetron (ZOFRAN) 4 MG tablet Take 1 tablet by mouth as needed for nausea/vomiting 3 times a day as needed 60 tablet 3    midodrine (PROAMATINE) 5 MG tablet Take 1 tablet by mouth one time a day every Tue, Thu, Sat for hypotension. Give 30 mins prior to dialysis. 90 tablet 3    Handicap Placard MISC by Does not apply route Expiration in 5 years. 1 each 0    sertraline (ZOLOFT) 50 MG tablet TAKE 1 TABLET BY MOUTH DAILY 30 tablet 2    vitamin B-12 (CYANOCOBALAMIN) 100 MCG tablet TAKE 1 TABLET BY MOUTH ONCE DAILY 90 tablet 4    fluticasone (FLONASE) 50 MCG/ACT nasal spray 1 spray by Each Nostril route daily      SITagliptin (JANUVIA) 50 MG tablet Take 50 mg by mouth daily      lidocaine-prilocaine (EMLA) 2.5-2.5 % cream Apply topically as needed for Pain Apply topically as needed. 3 times a week before dialysis wrap with saran wrap      senna (SENOKOT) 8.6 MG tablet Take 1 tablet by mouth nightly      triamcinolone (KENALOG) 0.1 % cream Apply topically daily Apply topically 2 times daily.       albuterol (PROVENTIL) (2.5 MG/3ML) 0.083% nebulizer solution Take 3 mLs by nebulization every 4 hours as needed for Wheezing 120 each 3    hydrOXYzine (ATARAX) 25 MG tablet TAKE ONE TABLET BY MOUTH TWO TIMES A DAY      NYAMYC 720031 UNIT/GM powder APPLY TO AFFECTED AREA OF ABDOMINAL FOLDS EVERY 12 HOURS AS NEEDED      insulin glargine (LANTUS SOLOSTAR) 100 UNIT/ML injection pen 70 UNITS AT BEDTIME 30 pen 3    blood glucose test mild to moderate pain (Pain 1-6 out of 10 on pain scale)) 120 tablet 0    Blood Glucose Monitoring Suppl (FREESTYLE LITE) CORETTA 1 Device by Does not apply route daily as needed (Diabetes) Use freestyle meter to test blood sugar as needed 1 Device 0    nitroGLYCERIN (NITROSTAT) 0.4 MG SL tablet Place 1 tablet under the tongue every 5 minutes as needed for Chest pain       No current facility-administered medications for this visit. Review of Systems:   Review of Systems   Constitutional: Negative. Negative for diaphoresis and fatigue. HENT: Negative. Eyes: Negative. Respiratory: Negative. Negative for cough, chest tightness, shortness of breath, wheezing and stridor. Cardiovascular: Negative. Negative for chest pain, palpitations and leg swelling. Gastrointestinal: Negative. Negative for blood in stool and nausea. Genitourinary: Negative. Musculoskeletal: Negative. Skin: Negative. Neurological: Negative. Negative for dizziness, syncope, weakness and light-headedness. Hematological: Negative. Psychiatric/Behavioral: Negative. Physical Examination:    /62 (Site: Left Upper Arm, Position: Sitting, Cuff Size: Large Adult)   Pulse 64   Resp 18   Ht 5' 7\" (1.702 m)   Wt 219 lb (99.3 kg)   LMP  (LMP Unknown)   SpO2 98%   BMI 34.30 kg/m²    Physical Exam   Constitutional: She appears healthy. No distress. HENT:   Normal cephalic and Atraumatic   Eyes: Pupils are equal, round, and reactive to light. Neck: Thyroid normal. No JVD present. No neck adenopathy. No thyromegaly present. Cardiovascular: Normal rate, regular rhythm, intact distal pulses and normal pulses. Murmur heard. Pulmonary/Chest: Effort normal and breath sounds normal. She has no wheezes. She has no rales. She exhibits no tenderness. Abdominal: Soft. Bowel sounds are normal. There is no abdominal tenderness. Musculoskeletal:         General: No tenderness or edema.  Normal range of motion. Cervical back: Normal range of motion and neck supple. Neurological: She is alert and oriented to person, place, and time. Skin: Skin is warm. No cyanosis. Nails show no clubbing.        LABS:  CBC:   Lab Results   Component Value Date    WBC 6.9 02/28/2022    RBC 3.05 02/28/2022    HGB 9.1 02/28/2022    HCT 28.3 02/28/2022    MCV 92.5 02/28/2022    MCH 30.0 02/28/2022    MCHC 32.4 02/28/2022    RDW 16.8 02/28/2022     02/28/2022    MPV 10.0 03/05/2020     Lipids:  Lab Results   Component Value Date    CHOL 123 01/13/2022    CHOL 101 06/11/2020    CHOL 107 05/25/2020     Lab Results   Component Value Date    TRIG 228 (H) 01/13/2022    TRIG 82 06/11/2020    TRIG 85 05/25/2020     Lab Results   Component Value Date    HDL 39 (L) 01/13/2022    HDL 48 06/11/2020    HDL 44 05/25/2020     Lab Results   Component Value Date    LDLCALC 38 01/13/2022    LDLCALC 37 06/11/2020    LDLCALC 46 05/25/2020     Lab Results   Component Value Date    LABVLDL 59.0 05/04/2013    VLDL 43 01/30/2017     Lab Results   Component Value Date    CHOLHDLRATIO 4.32 01/30/2017     CMP:    Lab Results   Component Value Date     02/28/2022    K 3.7 02/28/2022    K 3.9 08/01/2021    CL 96 02/28/2022    CO2 31 02/28/2022    BUN 19 02/28/2022    CREATININE 3.79 02/28/2022    GFRAA 14.5 02/28/2022    LABGLOM 12.0 02/28/2022    GLUCOSE 241 02/28/2022    GLUCOSE 419 07/30/2021    PROT 6.8 01/12/2022    LABALBU 3.8 01/12/2022    CALCIUM 9.4 02/28/2022    BILITOT 0.6 01/12/2022    ALKPHOS 103 01/12/2022    AST 22 01/12/2022    ALT 18 01/12/2022     BMP:    Lab Results   Component Value Date     02/28/2022    K 3.7 02/28/2022    K 3.9 08/01/2021    CL 96 02/28/2022    CO2 31 02/28/2022    BUN 19 02/28/2022    LABALBU 3.8 01/12/2022    CREATININE 3.79 02/28/2022    CALCIUM 9.4 02/28/2022    GFRAA 14.5 02/28/2022    LABGLOM 12.0 02/28/2022    GLUCOSE 241 02/28/2022    GLUCOSE 419 07/30/2021     Magnesium:    Lab Results Component Value Date    MG 2.3 01/13/2022     TSH:  Lab Results   Component Value Date    TSH 0.856 07/16/2021       Patient Active Problem List   Diagnosis    Atherosclerotic heart disease of native coronary artery with unspecified angina pectoris (HCC)    Schizophrenia, paranoid, chronic    Metabolic syndrome    Vitamin B 12 deficiency    Cerebral microvascular disease    Mixed hyperlipidemia    Other hammer toe (acquired)    Vitamin D insufficiency    Incontinence of urine    Diabetic nephropathy with proteinuria ([address]    Essential (primary) hypertension    History of type C viral hepatitis    Urinary incontinence due to cognitive impairment    History of seizures    Stented coronary artery-plan is to stay on Plavix indefinately per Dr Shonna Burroughs Other specified diabetes mellitus with diabetic neuropathy, unspecified ([address]    Controlled type 2 diabetes mellitus with diabetic neuropathy, with long-term current use of insulin (HCC)    Hemiparesis, left (HCC)    Angina, class II ([address]    Pain, unspecified    Tardive dyskinesia    Shortness of breath    Chronic diastolic congestive heart failure (HCC)    Sleep apnea, unspecified    Pulmonary hypertension, unspecified (HCC)    Class 2 severe obesity with serious comorbidity and body mass index (BMI) of 36.0 to 36.9 in adult (HCC)    Edema    Closed supracondylar fracture of right humerus    Other chronic pain    Palliative care patient    Recurrent falls    Renal failure    Difficulty in walking    ESRD (end stage renal disease) on dialysis (HCC)    Weakness    Other seizures (HCC)    Moderate persistent asthma without complication    UZLOR-52    Post PTCA    Falls frequently    Chest wall contusion, left, initial encounter    Headache, unspecified    Paranoid schizophrenia ([address]    Compression fracture of spine ([address]    Closed rib fracture    Depression    Chronic obstructive pulmonary disease ([address]    Critical illness polyneuropathy ([facility]    Multiple closed fractures of ribs of right side    Nonrheumatic mitral valve regurgitation    Nonrheumatic tricuspid valve regurgitation    Need for extended care facility    Chronic pain of both shoulders    Hemodialysis-associated hypotension    Anginal chest pain at rest [facility])    Chest pain    Unstable angina (HCC)       There are no discontinued medications. Modified Medications    No medications on file       No orders of the defined types were placed in this encounter. Assessment/Plan:    1. Coronary artery disease involving native heart with angina pectoris, unspecified vessel or lesion type ([facility]    2. CABG and TV repair with pericaridal window- repeat Echo. Will need Cardiac Rehab but not ready yet. RTO 1 mo    2. Diastolic congestive heart failure, unspecified HF chronicity (HCC)  Stable. No edema. - continue meds. Low salt diet    3. Stented coronary artery    4. Shortness of breath   stable - no worse    5. Mixed hyperlipidemia  Statin - long term. Check labs. Low fat diet. 6. Essential hypertension BP low- stop Imdur 120 for now. 7. Coronary artery disease involving native coronary artery of native heart with angina pectoris ([facility]    8 dizzy/falls- CUS. - moderate B/L- will continue surveillance. Counseling:  Heart Healthy Lifestyle, Low Salt Diet, Take Precautions to Prevent Falls and Walk Daily    Return in about 1 month (around 4/24/2022).       Electronically signed by Angelito Silver MD on 3/24/2022 at 4:35 PM

## 2022-04-01 ENCOUNTER — TELEPHONE (OUTPATIENT)
Dept: CARDIOLOGY CLINIC | Age: 62
End: 2022-04-01

## 2022-04-01 NOTE — TELEPHONE ENCOUNTER
Patient called to advise she has been having CHEST PAIN and taking NITRO.  Advised patient to visit ED if she continues to have CHEST PAIN

## 2022-04-03 ASSESSMENT — ENCOUNTER SYMPTOMS
CHEST TIGHTNESS: 0
COUGH: 0
WHEEZING: 0
VOMITING: 0
NAUSEA: 0

## 2022-04-04 DIAGNOSIS — G62.81 CRITICAL ILLNESS POLYNEUROPATHY (HCC): ICD-10-CM

## 2022-04-04 DIAGNOSIS — Z79.4 CONTROLLED TYPE 2 DIABETES MELLITUS WITH DIABETIC NEUROPATHY, WITH LONG-TERM CURRENT USE OF INSULIN (HCC): ICD-10-CM

## 2022-04-04 DIAGNOSIS — I10 ESSENTIAL (PRIMARY) HYPERTENSION: ICD-10-CM

## 2022-04-04 DIAGNOSIS — E11.40 CONTROLLED TYPE 2 DIABETES MELLITUS WITH DIABETIC NEUROPATHY, WITH LONG-TERM CURRENT USE OF INSULIN (HCC): ICD-10-CM

## 2022-04-04 DIAGNOSIS — F33.1 MODERATE EPISODE OF RECURRENT MAJOR DEPRESSIVE DISORDER (HCC): ICD-10-CM

## 2022-04-04 RX ORDER — AMLODIPINE BESYLATE 10 MG/1
TABLET ORAL
Qty: 30 TABLET | Refills: 10 | OUTPATIENT
Start: 2022-04-04

## 2022-04-08 ENCOUNTER — APPOINTMENT (OUTPATIENT)
Dept: GENERAL RADIOLOGY | Age: 62
End: 2022-04-08
Payer: MEDICARE

## 2022-04-08 ENCOUNTER — HOSPITAL ENCOUNTER (OUTPATIENT)
Age: 62
Setting detail: OBSERVATION
Discharge: HOME HEALTH | End: 2022-04-09
Attending: INTERNAL MEDICINE | Admitting: INTERNAL MEDICINE
Payer: MEDICARE

## 2022-04-08 DIAGNOSIS — N18.6 STAGE 5 CHRONIC KIDNEY DISEASE ON CHRONIC DIALYSIS (HCC): ICD-10-CM

## 2022-04-08 DIAGNOSIS — I21.4 NSTEMI (NON-ST ELEVATED MYOCARDIAL INFARCTION) (HCC): Primary | ICD-10-CM

## 2022-04-08 DIAGNOSIS — Z99.2 STAGE 5 CHRONIC KIDNEY DISEASE ON CHRONIC DIALYSIS (HCC): ICD-10-CM

## 2022-04-08 LAB
ALBUMIN SERPL-MCNC: 4.2 G/DL (ref 3.5–4.6)
ALP BLD-CCNC: 95 U/L (ref 40–130)
ALT SERPL-CCNC: 33 U/L (ref 0–33)
ANION GAP SERPL CALCULATED.3IONS-SCNC: 8 MEQ/L (ref 9–15)
APTT: 26.6 SEC (ref 24.4–36.8)
AST SERPL-CCNC: 32 U/L (ref 0–35)
BASOPHILS ABSOLUTE: 0.1 K/UL (ref 0–0.2)
BASOPHILS RELATIVE PERCENT: 1 %
BILIRUB SERPL-MCNC: 0.4 MG/DL (ref 0.2–0.7)
BUN BLDV-MCNC: 13 MG/DL (ref 8–23)
CALCIUM SERPL-MCNC: 9.3 MG/DL (ref 8.5–9.9)
CHLORIDE BLD-SCNC: 93 MEQ/L (ref 95–107)
CO2: 32 MEQ/L (ref 20–31)
CREAT SERPL-MCNC: 4.13 MG/DL (ref 0.5–0.9)
EKG ATRIAL RATE: 75 BPM
EKG P-R INTERVAL: 208 MS
EKG Q-T INTERVAL: 464 MS
EKG QRS DURATION: 134 MS
EKG QTC CALCULATION (BAZETT): 518 MS
EKG R AXIS: -55 DEGREES
EKG T AXIS: 61 DEGREES
EKG VENTRICULAR RATE: 75 BPM
EOSINOPHILS ABSOLUTE: 0.2 K/UL (ref 0–0.7)
EOSINOPHILS RELATIVE PERCENT: 2.1 %
GFR AFRICAN AMERICAN: 13.1
GFR NON-AFRICAN AMERICAN: 10.9
GLOBULIN: 2.7 G/DL (ref 2.3–3.5)
GLUCOSE BLD-MCNC: 216 MG/DL (ref 70–99)
GLUCOSE BLD-MCNC: 262 MG/DL (ref 70–99)
GLUCOSE BLD-MCNC: 262 MG/DL (ref 70–99)
HCT VFR BLD CALC: 30.9 % (ref 37–47)
HEMOGLOBIN: 10.4 G/DL (ref 12–16)
INR BLD: 1.4
LYMPHOCYTES ABSOLUTE: 1 K/UL (ref 1–4.8)
LYMPHOCYTES RELATIVE PERCENT: 12.6 %
MCH RBC QN AUTO: 29.7 PG (ref 27–31.3)
MCHC RBC AUTO-ENTMCNC: 33.6 % (ref 33–37)
MCV RBC AUTO: 88.5 FL (ref 82–100)
MONOCYTES ABSOLUTE: 0.5 K/UL (ref 0.2–0.8)
MONOCYTES RELATIVE PERCENT: 5.9 %
NEUTROPHILS ABSOLUTE: 6.1 K/UL (ref 1.4–6.5)
NEUTROPHILS RELATIVE PERCENT: 78.4 %
PDW BLD-RTO: 18.7 % (ref 11.5–14.5)
PERFORMED ON: ABNORMAL
PERFORMED ON: ABNORMAL
PLATELET # BLD: 103 K/UL (ref 130–400)
POTASSIUM SERPL-SCNC: 3.5 MEQ/L (ref 3.4–4.9)
PRO-BNP: NORMAL PG/ML
PROTHROMBIN TIME: 16.6 SEC (ref 12.3–14.9)
RBC # BLD: 3.49 M/UL (ref 4.2–5.4)
REASON FOR REJECTION: NORMAL
REJECTED TEST: NORMAL
SODIUM BLD-SCNC: 133 MEQ/L (ref 135–144)
TOTAL CK: 70 U/L (ref 0–170)
TOTAL PROTEIN: 6.9 G/DL (ref 6.3–8)
TROPONIN: 0.09 NG/ML (ref 0–0.01)
WBC # BLD: 7.7 K/UL (ref 4.8–10.8)

## 2022-04-08 PROCEDURE — 85025 COMPLETE CBC W/AUTO DIFF WBC: CPT

## 2022-04-08 PROCEDURE — 93010 ELECTROCARDIOGRAM REPORT: CPT | Performed by: INTERNAL MEDICINE

## 2022-04-08 PROCEDURE — 6360000002 HC RX W HCPCS: Performed by: PHYSICIAN ASSISTANT

## 2022-04-08 PROCEDURE — 83880 ASSAY OF NATRIURETIC PEPTIDE: CPT

## 2022-04-08 PROCEDURE — 6370000000 HC RX 637 (ALT 250 FOR IP): Performed by: PHYSICIAN ASSISTANT

## 2022-04-08 PROCEDURE — 2580000003 HC RX 258: Performed by: PHYSICIAN ASSISTANT

## 2022-04-08 PROCEDURE — 93005 ELECTROCARDIOGRAM TRACING: CPT | Performed by: STUDENT IN AN ORGANIZED HEALTH CARE EDUCATION/TRAINING PROGRAM

## 2022-04-08 PROCEDURE — 85730 THROMBOPLASTIN TIME PARTIAL: CPT

## 2022-04-08 PROCEDURE — 96374 THER/PROPH/DIAG INJ IV PUSH: CPT

## 2022-04-08 PROCEDURE — 96372 THER/PROPH/DIAG INJ SC/IM: CPT

## 2022-04-08 PROCEDURE — 96375 TX/PRO/DX INJ NEW DRUG ADDON: CPT

## 2022-04-08 PROCEDURE — G0378 HOSPITAL OBSERVATION PER HR: HCPCS

## 2022-04-08 PROCEDURE — 99220 PR INITIAL OBSERVATION CARE/DAY 70 MINUTES: CPT | Performed by: INTERNAL MEDICINE

## 2022-04-08 PROCEDURE — 6370000000 HC RX 637 (ALT 250 FOR IP): Performed by: INTERNAL MEDICINE

## 2022-04-08 PROCEDURE — 94664 DEMO&/EVAL PT USE INHALER: CPT

## 2022-04-08 PROCEDURE — 71045 X-RAY EXAM CHEST 1 VIEW: CPT

## 2022-04-08 PROCEDURE — 84484 ASSAY OF TROPONIN QUANT: CPT

## 2022-04-08 PROCEDURE — 82550 ASSAY OF CK (CPK): CPT

## 2022-04-08 PROCEDURE — 36415 COLL VENOUS BLD VENIPUNCTURE: CPT

## 2022-04-08 PROCEDURE — 2500000003 HC RX 250 WO HCPCS: Performed by: INTERNAL MEDICINE

## 2022-04-08 PROCEDURE — 85610 PROTHROMBIN TIME: CPT

## 2022-04-08 PROCEDURE — 80053 COMPREHEN METABOLIC PANEL: CPT

## 2022-04-08 PROCEDURE — 99285 EMERGENCY DEPT VISIT HI MDM: CPT

## 2022-04-08 RX ORDER — UBIDECARENONE 75 MG
100 CAPSULE ORAL DAILY
Status: DISCONTINUED | OUTPATIENT
Start: 2022-04-08 | End: 2022-04-09 | Stop reason: HOSPADM

## 2022-04-08 RX ORDER — HYDROXYZINE HYDROCHLORIDE 25 MG/1
25 TABLET, FILM COATED ORAL EVERY 4 HOURS PRN
Status: DISCONTINUED | OUTPATIENT
Start: 2022-04-08 | End: 2022-04-09 | Stop reason: HOSPADM

## 2022-04-08 RX ORDER — NITROGLYCERIN 0.4 MG/1
0.4 TABLET SUBLINGUAL EVERY 5 MIN PRN
Status: DISCONTINUED | OUTPATIENT
Start: 2022-04-08 | End: 2022-04-08

## 2022-04-08 RX ORDER — ONDANSETRON 4 MG/1
4 TABLET, FILM COATED ORAL EVERY 8 HOURS PRN
Status: DISCONTINUED | OUTPATIENT
Start: 2022-04-08 | End: 2022-04-08

## 2022-04-08 RX ORDER — ATORVASTATIN CALCIUM 80 MG/1
80 TABLET, FILM COATED ORAL NIGHTLY
Status: DISCONTINUED | OUTPATIENT
Start: 2022-04-08 | End: 2022-04-09 | Stop reason: HOSPADM

## 2022-04-08 RX ORDER — ARIPIPRAZOLE 5 MG/1
5 TABLET ORAL DAILY
Status: DISCONTINUED | OUTPATIENT
Start: 2022-04-08 | End: 2022-04-09 | Stop reason: HOSPADM

## 2022-04-08 RX ORDER — HEPARIN SODIUM 1000 [USP'U]/ML
1000 INJECTION, SOLUTION INTRAVENOUS; SUBCUTANEOUS PRN
Status: DISCONTINUED | OUTPATIENT
Start: 2022-04-08 | End: 2022-04-09 | Stop reason: HOSPADM

## 2022-04-08 RX ORDER — POLYETHYLENE GLYCOL 3350 17 G/17G
17 POWDER, FOR SOLUTION ORAL DAILY
Status: DISCONTINUED | OUTPATIENT
Start: 2022-04-08 | End: 2022-04-09 | Stop reason: HOSPADM

## 2022-04-08 RX ORDER — ACETAMINOPHEN 325 MG/1
650 TABLET ORAL EVERY 6 HOURS PRN
Status: DISCONTINUED | OUTPATIENT
Start: 2022-04-08 | End: 2022-04-09 | Stop reason: HOSPADM

## 2022-04-08 RX ORDER — ALOGLIPTIN 6.25 MG/1
6.25 TABLET, FILM COATED ORAL DAILY
Status: DISCONTINUED | OUTPATIENT
Start: 2022-04-08 | End: 2022-04-09 | Stop reason: HOSPADM

## 2022-04-08 RX ORDER — ACETAMINOPHEN 325 MG/1
650 TABLET ORAL EVERY 4 HOURS PRN
Status: DISCONTINUED | OUTPATIENT
Start: 2022-04-08 | End: 2022-04-09 | Stop reason: HOSPADM

## 2022-04-08 RX ORDER — MECOBALAMIN 5000 MCG
10 TABLET,DISINTEGRATING ORAL NIGHTLY
Status: DISCONTINUED | OUTPATIENT
Start: 2022-04-08 | End: 2022-04-09 | Stop reason: HOSPADM

## 2022-04-08 RX ORDER — SODIUM CHLORIDE 9 MG/ML
INJECTION, SOLUTION INTRAVENOUS PRN
Status: DISCONTINUED | OUTPATIENT
Start: 2022-04-08 | End: 2022-04-09 | Stop reason: HOSPADM

## 2022-04-08 RX ORDER — ACETAMINOPHEN 650 MG/1
650 SUPPOSITORY RECTAL EVERY 6 HOURS PRN
Status: DISCONTINUED | OUTPATIENT
Start: 2022-04-08 | End: 2022-04-09 | Stop reason: HOSPADM

## 2022-04-08 RX ORDER — TRIAMCINOLONE ACETONIDE 1 MG/G
CREAM TOPICAL DAILY
Status: DISCONTINUED | OUTPATIENT
Start: 2022-04-08 | End: 2022-04-09 | Stop reason: HOSPADM

## 2022-04-08 RX ORDER — LIDOCAINE AND PRILOCAINE 25; 25 MG/G; MG/G
CREAM TOPICAL PRN
Status: DISCONTINUED | OUTPATIENT
Start: 2022-04-08 | End: 2022-04-09 | Stop reason: HOSPADM

## 2022-04-08 RX ORDER — LANOLIN ALCOHOL/MO/W.PET/CERES
400 CREAM (GRAM) TOPICAL DAILY
Status: DISCONTINUED | OUTPATIENT
Start: 2022-04-08 | End: 2022-04-09 | Stop reason: HOSPADM

## 2022-04-08 RX ORDER — ONDANSETRON 4 MG/1
4 TABLET, ORALLY DISINTEGRATING ORAL EVERY 8 HOURS PRN
Status: DISCONTINUED | OUTPATIENT
Start: 2022-04-08 | End: 2022-04-08

## 2022-04-08 RX ORDER — ISOSORBIDE MONONITRATE 60 MG/1
120 TABLET, EXTENDED RELEASE ORAL DAILY
Status: DISCONTINUED | OUTPATIENT
Start: 2022-04-08 | End: 2022-04-09 | Stop reason: HOSPADM

## 2022-04-08 RX ORDER — ONDANSETRON 2 MG/ML
4 INJECTION INTRAMUSCULAR; INTRAVENOUS ONCE
Status: COMPLETED | OUTPATIENT
Start: 2022-04-08 | End: 2022-04-08

## 2022-04-08 RX ORDER — PANTOPRAZOLE SODIUM 20 MG/1
20 TABLET, DELAYED RELEASE ORAL DAILY
Status: DISCONTINUED | OUTPATIENT
Start: 2022-04-08 | End: 2022-04-09 | Stop reason: HOSPADM

## 2022-04-08 RX ORDER — ASPIRIN 81 MG/1
81 TABLET ORAL DAILY
Status: DISCONTINUED | OUTPATIENT
Start: 2022-04-08 | End: 2022-04-09 | Stop reason: HOSPADM

## 2022-04-08 RX ORDER — ATORVASTATIN CALCIUM 40 MG/1
40 TABLET, FILM COATED ORAL DAILY
Status: DISCONTINUED | OUTPATIENT
Start: 2022-04-08 | End: 2022-04-08 | Stop reason: SDUPTHER

## 2022-04-08 RX ORDER — FLUTICASONE PROPIONATE 50 MCG
1 SPRAY, SUSPENSION (ML) NASAL DAILY
Status: DISCONTINUED | OUTPATIENT
Start: 2022-04-08 | End: 2022-04-09 | Stop reason: HOSPADM

## 2022-04-08 RX ORDER — NITROGLYCERIN 0.4 MG/1
0.4 TABLET SUBLINGUAL EVERY 5 MIN PRN
Status: DISCONTINUED | OUTPATIENT
Start: 2022-04-08 | End: 2022-04-09 | Stop reason: HOSPADM

## 2022-04-08 RX ORDER — SENNA PLUS 8.6 MG/1
1 TABLET ORAL NIGHTLY
Status: DISCONTINUED | OUTPATIENT
Start: 2022-04-08 | End: 2022-04-09 | Stop reason: HOSPADM

## 2022-04-08 RX ORDER — ONDANSETRON 2 MG/ML
4 INJECTION INTRAMUSCULAR; INTRAVENOUS EVERY 6 HOURS PRN
Status: DISCONTINUED | OUTPATIENT
Start: 2022-04-08 | End: 2022-04-08

## 2022-04-08 RX ORDER — POLYETHYLENE GLYCOL 3350 17 G/17G
17 POWDER, FOR SOLUTION ORAL DAILY PRN
Status: DISCONTINUED | OUTPATIENT
Start: 2022-04-08 | End: 2022-04-09 | Stop reason: HOSPADM

## 2022-04-08 RX ORDER — HEPARIN SODIUM 5000 [USP'U]/ML
5000 INJECTION, SOLUTION INTRAVENOUS; SUBCUTANEOUS EVERY 8 HOURS SCHEDULED
Status: DISCONTINUED | OUTPATIENT
Start: 2022-04-08 | End: 2022-04-09 | Stop reason: HOSPADM

## 2022-04-08 RX ORDER — ASPIRIN 81 MG/1
81 TABLET, CHEWABLE ORAL DAILY
Status: DISCONTINUED | OUTPATIENT
Start: 2022-04-09 | End: 2022-04-08

## 2022-04-08 RX ORDER — CHOLECALCIFEROL (VITAMIN D3) 10 MCG
1 TABLET ORAL DAILY
Status: DISCONTINUED | OUTPATIENT
Start: 2022-04-08 | End: 2022-04-09 | Stop reason: HOSPADM

## 2022-04-08 RX ORDER — ALBUMIN (HUMAN) 12.5 G/50ML
25 SOLUTION INTRAVENOUS DAILY PRN
Status: DISCONTINUED | OUTPATIENT
Start: 2022-04-08 | End: 2022-04-09 | Stop reason: HOSPADM

## 2022-04-08 RX ORDER — SODIUM CHLORIDE 0.9 % (FLUSH) 0.9 %
5-40 SYRINGE (ML) INJECTION PRN
Status: DISCONTINUED | OUTPATIENT
Start: 2022-04-08 | End: 2022-04-09 | Stop reason: HOSPADM

## 2022-04-08 RX ORDER — MORPHINE SULFATE 4 MG/ML
4 INJECTION, SOLUTION INTRAMUSCULAR; INTRAVENOUS ONCE
Status: COMPLETED | OUTPATIENT
Start: 2022-04-08 | End: 2022-04-08

## 2022-04-08 RX ORDER — SODIUM CHLORIDE 0.9 % (FLUSH) 0.9 %
5-40 SYRINGE (ML) INJECTION EVERY 12 HOURS SCHEDULED
Status: DISCONTINUED | OUTPATIENT
Start: 2022-04-08 | End: 2022-04-09 | Stop reason: HOSPADM

## 2022-04-08 RX ORDER — ALBUTEROL SULFATE 2.5 MG/3ML
2.5 SOLUTION RESPIRATORY (INHALATION) EVERY 4 HOURS PRN
Status: DISCONTINUED | OUTPATIENT
Start: 2022-04-08 | End: 2022-04-09 | Stop reason: HOSPADM

## 2022-04-08 RX ORDER — DOCUSATE SODIUM 100 MG/1
100 CAPSULE, LIQUID FILLED ORAL 2 TIMES DAILY
Status: DISCONTINUED | OUTPATIENT
Start: 2022-04-08 | End: 2022-04-09 | Stop reason: HOSPADM

## 2022-04-08 RX ADMIN — NITROGLYCERIN 0.4 MG: 0.4 TABLET SUBLINGUAL at 12:23

## 2022-04-08 RX ADMIN — HEPARIN SODIUM 5000 UNITS: 5000 INJECTION INTRAVENOUS; SUBCUTANEOUS at 14:00

## 2022-04-08 RX ADMIN — HEPARIN SODIUM 5000 UNITS: 5000 INJECTION INTRAVENOUS; SUBCUTANEOUS at 22:57

## 2022-04-08 RX ADMIN — NITROGLYCERIN 0.4 MG: 0.4 TABLET SUBLINGUAL at 12:28

## 2022-04-08 RX ADMIN — ONDANSETRON 4 MG: 2 INJECTION INTRAMUSCULAR; INTRAVENOUS at 12:57

## 2022-04-08 RX ADMIN — ISOSORBIDE MONONITRATE 120 MG: 60 TABLET, EXTENDED RELEASE ORAL at 22:46

## 2022-04-08 RX ADMIN — ASPIRIN 81 MG: 81 TABLET, COATED ORAL at 22:47

## 2022-04-08 RX ADMIN — FUROSEMIDE 100 MG: 80 TABLET ORAL at 22:46

## 2022-04-08 RX ADMIN — ATORVASTATIN CALCIUM 80 MG: 80 TABLET, FILM COATED ORAL at 22:46

## 2022-04-08 RX ADMIN — TRIAMCINOLONE ACETONIDE: 1 CREAM TOPICAL at 22:49

## 2022-04-08 RX ADMIN — NEPHROCAP 1 MG: 1 CAP ORAL at 22:48

## 2022-04-08 RX ADMIN — PANTOPRAZOLE SODIUM 20 MG: 20 TABLET, DELAYED RELEASE ORAL at 23:13

## 2022-04-08 RX ADMIN — MICONAZOLE NITRATE: 2 POWDER TOPICAL at 22:49

## 2022-04-08 RX ADMIN — Medication 10 ML: at 23:03

## 2022-04-08 RX ADMIN — NITROGLYCERIN 0.4 MG: 0.4 TABLET SUBLINGUAL at 12:18

## 2022-04-08 RX ADMIN — MORPHINE SULFATE 4 MG: 4 INJECTION, SOLUTION INTRAMUSCULAR; INTRAVENOUS at 12:57

## 2022-04-08 RX ADMIN — Medication 400 MG: at 23:13

## 2022-04-08 RX ADMIN — INSULIN LISPRO 3 UNITS: 100 INJECTION, SOLUTION INTRAVENOUS; SUBCUTANEOUS at 22:51

## 2022-04-08 RX ADMIN — ARIPIPRAZOLE 5 MG: 5 TABLET ORAL at 22:46

## 2022-04-08 RX ADMIN — Medication 10 MG: at 22:46

## 2022-04-08 RX ADMIN — ALOGLIPTIN 6.25 MG: 6.25 TABLET, FILM COATED ORAL at 23:13

## 2022-04-08 RX ADMIN — ACETAMINOPHEN 650 MG: 325 TABLET ORAL at 18:23

## 2022-04-08 ASSESSMENT — PAIN DESCRIPTION - PAIN TYPE
TYPE: ACUTE PAIN

## 2022-04-08 ASSESSMENT — ENCOUNTER SYMPTOMS
ABDOMINAL PAIN: 0
BLOOD IN STOOL: 0
COUGH: 0
STRIDOR: 0
ABDOMINAL DISTENTION: 0
CHEST TIGHTNESS: 0
SHORTNESS OF BREATH: 0
CONSTIPATION: 0
NAUSEA: 0
RHINORRHEA: 0
WHEEZING: 0
COLOR CHANGE: 0
RESPIRATORY NEGATIVE: 1
SORE THROAT: 0
GASTROINTESTINAL NEGATIVE: 1
EYES NEGATIVE: 1
EYE DISCHARGE: 0

## 2022-04-08 ASSESSMENT — PAIN DESCRIPTION - FREQUENCY
FREQUENCY: CONTINUOUS
FREQUENCY: CONTINUOUS

## 2022-04-08 ASSESSMENT — PAIN SCALES - GENERAL
PAINLEVEL_OUTOF10: 5

## 2022-04-08 ASSESSMENT — PAIN DESCRIPTION - LOCATION
LOCATION: CHEST

## 2022-04-08 ASSESSMENT — PAIN DESCRIPTION - PROGRESSION: CLINICAL_PROGRESSION: GRADUALLY IMPROVING

## 2022-04-08 ASSESSMENT — PAIN DESCRIPTION - DESCRIPTORS: DESCRIPTORS: SHARP

## 2022-04-08 ASSESSMENT — PAIN DESCRIPTION - ORIENTATION: ORIENTATION: LEFT;UPPER

## 2022-04-08 NOTE — CARE COORDINATION
04/08/22    From:HOME W DTR DEPENDENT W CARE HAS [facility]HC 7D/WK 7'/DAY PREVIOUS STAY KOV FOR REHAB POST CARDIAC SURG 1/22.  [facility]     Admit:     PMH:SCHIZOPHRENIA, HTN, COPD, DM, HEPATITIS, CHF, CKD    Anticipated Discharge [address] VS SNF    Patient Mobility or PT/OT ordered:N/A    Consults:NEPHROLOGY     Covid result &/or vacc status:     Barriers to Discharge:  NSTEMI   ECHO NEEDS DONE  CKD STAGE V 13/ 4.13  TROPS 0.085    Assessments: CMI DONE

## 2022-04-08 NOTE — ED TRIAGE NOTES
Patient is dialysis patient on tues thurs sat, at [facility] for therapy for heart surgery in January  .  Chest pain x 2 days,

## 2022-04-08 NOTE — CARE COORDINATION
[facility] EMERGENCY [facility] CENTER AT [place] Case Management Initial Discharge Assessment    Met with Patient to discuss discharge plan. PCP: Sophia Rodriguez MD                                Date of Last Visit: N/A      [facility] Patient: No        [facility] Notified: no    If no PCP, list provided? N/A    Discharge Planning    Living Arrangements: at home dependent on family care Daughter Helps     Who do you live with? Daughter Romero Barraza     Who helps you with your care:  Family, Self and [facility] Comes 7 days a week for 7 hours. If lives at home:     Do you have any barriers navigating in your home? yes - Falls    Patient can perform ADL? Yes    Current Services (outpatient and in home) :  [facility] and Daughter Romero Barraza helps. Dialysis: Yes, Location [place] , Chair Time N/A    Is transportation available to get to your appointments? Yes Daughter Drives. DME Equipment:  yes - Walker    Respiratory equipment: None    Respiratory provider:  no     Pharmacy:  yes - [facility] [address] with Medication Assistance Program?  No      Patient agreeable to [place] [id number]? Yes, [address]    Patient agreeable to SNF/Rehab? Yes, [facility]    Other discharge needs identified? TBA    Does Patient Have a High-Risk for Readmission Diagnosis (CHF, PN, MI, COPD)? Yes      Initial Discharge Plan? (Note: please see concurrent daily documentation for any updates after initial note). Pt would like to go home with [facility] and daughter helps out as need it. Pt is open to go to SNF for rehab if need it.      Readmission Risk              Risk of Unplanned Readmission:  0        CMI DONE   Electronically signed by HIPOLITO Blackwood on 4/8/2022 at 1:18 PM

## 2022-04-08 NOTE — CARE COORDINATION
LSW called [address] [place] [address] spoke to Jay Turcios at Admission. Jay Turcios states that pt was at [address] [place] [address] in January and was discharge 3/9/2022 for outpatient rehab.      [address] [place] [address] Phone #: [phone]  Electronically signed by HIPOLITO Bustos on 4/8/2022 at 1:35 PM

## 2022-04-08 NOTE — PROGRESS NOTES
DVT / VTE PROPHYLAXIS EVALUATION    Estimated Creatinine Clearance: 17 mL/min (A) (based on SCr of 4.13 mg/dL (H)). Recent Labs     04/08/22  1030 04/08/22  1120   BUN  --  13   CREATININE  --  4.13*   *  --    HGB 10.4*  --    HCT 30.9*  --    INR 1.4  --      ADMITTING DX OR CHIEF COMPLAINT?  Chest pain   WARFARIN? DOAC'S? no  ANY APPARENT BLEEDING? no  SCHEDULED SURGERY? no     Current order:  UFH 5000 units SUBQ 3 times daily      Plan:  No intervention recommended, continue current VTE prophylaxis as ordered     Patient Weight (kg)      50.9 and below .9 101-150.9 151-174.9 175 or greater   Estimated   CrCl  (ml/min) 30 or greater []   30 mg   SUBQ daily   []   40 mg   SUBQ daily []  30 mg SUBQ   BID  []  40 mg   SUBQ   BID []  60mg SUBQ BID    15-29.9 []  UFH 5000   units SUBQ BID []  30 mg   SUBQ daily [] 30 mg SUBQ   daily []  40 mg SUBQ   daily [] 60 mg SUBQ   daily    Less than 15 or dialysis []  UFH 5000   units SUBQ BID [x] UFH 5000 units SUBQ TID []  UFH 7500   units   SUBQ TID         DWIGHT Mensah [facility] PharmD

## 2022-04-08 NOTE — ED PROVIDER NOTES
[address] [facility] ED  eMERGENCY dEPARTMENT eNCOUnter      Pt Name: Azam Gee  MRN: [id number]  [place] 1960  Date of evaluation: 4/8/2022  Provider: Rahul Lynn PA-C    CHIEF COMPLAINT       Chief Complaint   Patient presents with    Chest Pain     chest pain started 2 days ago patient at [facility] for rehab heart surgery in jan 22         HISTORY OF PRESENT ILLNESS   (Location/Symptom, Timing/Onset,Context/Setting, Quality, Duration, Modifying Factors, Severity)  Note limiting factors. Azam Gee is a 57 y.o. female who presents to the emergency department with a complaint of left-sided chest pain which patient states been ongoing for last 2 days. She states she has been taken nitroglycerin at home, she is taken 6 over the last 2 days, she states it does seem to alleviate her pain. She states that she was at [facility] today for cardiac rehab, and was able to complete the process, as she was having chest pain. She was sent from there to the emerge department for evaluation. Patient had recent cardiac bypass at the [facility] clinic at the end of January. She is currently followed by Dr. Dia Keller of [facility] cardiology patient states her current pain at this time is 6 out of 10, she states some mild shortness of breath, no nausea vomiting, no diaphoresis. She has not take anything at home today for pain control. She states she was given 4 baby aspirin while at [facility], but has not had any nitroglycerin to this point. Past medical history significant for schizophrenia, hypertension, neurogenic bladder, coronary artery disease, gastric reflux, neuropathy asthma, COPD, type 2 diabetes, hepatitis anxiety chronic kidney disease congestive heart failure    HPI    NursingNotes were reviewed. REVIEW OF SYSTEMS    (2-9 systems for level 4, 10 or more for level 5)     Review of Systems   Constitutional: Negative for activity change and appetite change.    HENT: Negative for congestion, ear discharge, ear pain, nosebleeds, rhinorrhea and sore throat. Eyes: Negative for discharge. Respiratory: Negative for shortness of breath. Cardiovascular: Positive for chest pain. Negative for palpitations and leg swelling. Gastrointestinal: Negative for abdominal distention, abdominal pain and constipation. Genitourinary: Negative for difficulty urinating and dysuria. Musculoskeletal: Negative for arthralgias. Skin: Negative for color change. Neurological: Negative for dizziness, syncope, numbness and headaches. Psychiatric/Behavioral: Negative for agitation and confusion. Except as noted above the remainder of the review of systems was reviewed and negative.        PAST MEDICAL HISTORY     Past Medical History:   Diagnosis Date    Angina at rest [facility]) 5/24/2020    Anxiety     CAD S/P percutaneous coronary angioplasty 2015, 2018    stents per dr Andrade July    CHF (congestive heart failure) ([address]     CKD (chronic kidney disease) stage 4, GFR 15-29 ml/min ([address] 2/24/2018    CKD stage 4 due to type 2 diabetes mellitus ([address]     Contusion of right chest wall 2/16/2021    COPD (chronic obstructive pulmonary disease) ([address]     Diabetic nephropathy with proteinuria ([address] 2014    DJD (degenerative joint disease) of knee     Dr Rina Boyle GERD (gastroesophageal reflux disease)     Hemiparesis, left ([address] 2013    entered Assisted Living ([facility])    Hemodialysis patient [facility])     Hemodialysis-associated hypotension 10/22/2021    History of heart failure     History of seizures     History of type C viral hepatitis     HTN (hypertension)     Hyperlipidemia     Impaired mobility and activities of daily living     Mediastinal lymphadenopathy 2013    Dayanna Reilly    Metabolic syndrome     Moderate persistent asthma without complication 5/65/3434    Need for extended care facility 7/7/2021    Neurogenic urinary incontinence 2013    Neuropathy in diabetes [facility])     Nonrheumatic mitral valve regurgitation 2021    Nonrheumatic tricuspid valve regurgitation 2021    Obesity (BMI 30-39. 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 Stefani Mayo      x1 Dr. Tayler Car, Dr Rosa M Bonilla 2018   Arianne Ar  08/10/2021    [facility]    DIAGNOSTIC CARDIAC CATH LAB PROCEDURE  10/02/2019    HYSTERECTOMY, TOTAL ABDOMINAL      one ovary intact, Dr Enrique Rodarte, menorrhagia    CT TOTAL KNEE ARTHROPLASTY Left 2018    LEFT KNEE TOTAL KNEE ARTHROPLASTY, SHAYNA, NERVE BLOCK performed by Rush Salazar MD at [facility]      TOE AMPUTATION Right     TOTAL KNEE ARTHROPLASTY  16    Dr Sanket Solano TUNNELED [address] Right 2020    tunneled HD catheter per Dr Arlyn Rock       Previous Medications    ACETAMINOPHEN (TYLENOL) 325 MG TABLET    Take 2 tablets by mouth every 4 hours as needed for Pain (For mild to moderate pain (Pain 1-6 out of 10 on pain scale))    ALBUTEROL (PROVENTIL) (2.5 MG/3ML) 0.083% NEBULIZER SOLUTION    Take 3 mLs by nebulization every 4 hours as needed for Wheezing    ALCOHOL SWABS (EASY TOUCH ALCOHOL PREP MEDIUM) 70 % PADS    USE AS DIRECTED THREE TIMES A DAY    ARIPIPRAZOLE (ABILIFY) 5 MG TABLET    TAKE 1 TABLET BY MOUTH ONCE DAILY    ASPIRIN EC 81 MG EC TABLET    Take 1 tablet by mouth daily    ATORVASTATIN (LIPITOR) 40 MG TABLET    Take 1 tablet by mouth daily    B COMPLEX-C-FOLIC ACID (NEPHROCAPS) 1 MG CAPSULE    Take 1 capsule by mouth daily    BLOOD GLUCOSE MONITORING SUPPL (FREESTYLE LITE) CORETTA    1 Device by Does not apply route daily as needed (Diabetes) Use freestyle meter to test blood sugar as needed    BLOOD GLUCOSE MONITORING SUPPL (ONETOUCH VERIO) W/DEVICE KIT    AS DIRECTED    BLOOD GLUCOSE TEST STRIPS (FREESTYLE LITE) STRIP    1 each by Does not apply route 4 times daily (before meals and nightly) As needed. BLOOD GLUCOSE TEST STRIPS (ONETOUCH VERIO) STRIP    QID    DOCUSATE SODIUM (COLACE, DULCOLAX) 100 MG CAPS    Take 100 mg by mouth 2 times daily For the prevention and treatment of constipation while taking pain medications. FLUTICASONE (FLONASE) 50 MCG/ACT NASAL SPRAY    1 spray by Each Nostril route daily    FREESTYLE LANCETS MISC    Test 4x daily    FUROSEMIDE (LASIX) 20 MG TABLET    Take 5 tablets by mouth 2 times daily    HANDICAP PLACARD MISC    by Does not apply route Expiration in 5 years.     HYDROXYZINE (ATARAX) 25 MG TABLET    TAKE ONE TABLET BY MOUTH TWO TIMES A DAY    INSULIN ASPART (NOVOLOG FLEXPEN) 100 UNIT/ML INJECTION PEN    INJECT 8-10  units with each meals    INSULIN ASPART (NOVOLOG FLEXPEN) 100 UNIT/ML INJECTION PEN    15 UNITS PLUS SLIDING SCALE   LESS THAN 180 NONE  181-250 2 UNITS  251-300 4 UNITS  301-400 6 UNITS   401-500 10 UNITS    INSULIN GLARGINE (LANTUS SOLOSTAR) 100 UNIT/ML INJECTION PEN    70 UNITS AT BEDTIME    INSULIN PEN NEEDLE (SURE COMFORT PEN NEEDLES) 30G X 8 MM MISC    USE AS DIRECTED FIVE TIMES A DAILY    ISOSORBIDE MONONITRATE (IMDUR) 30 MG EXTENDED RELEASE TABLET    Take 120 mg by mouth daily    LANTUS SOLOSTAR 100 UNIT/ML INJECTION PEN 55 units at bedtime    LIDOCAINE-PRILOCAINE (EMLA) 2.5-2.5 % CREAM    Apply topically as needed for Pain Apply topically as needed. 3 times a week before dialysis wrap with saran wrap    MAGNESIUM OXIDE (MAG-OX) 400 MG TABLET    Take 1 tablet by mouth daily    MELATONIN 10 MG CAPS CAPSULE    Take 1 capsule by mouth nightly    METOPROLOL TARTRATE (LOPRESSOR) 25 MG TABLET    TAKE 1/2 TABLET BY MOUTH TWO TIMES DAILY     MIDODRINE (PROAMATINE) 5 MG TABLET    Take 1 tablet by mouth one time a day every Tue, Thu, Sat for hypotension. Give 30 mins prior to dialysis. NITROGLYCERIN (NITROSTAT) 0.4 MG SL TABLET    Place 1 tablet under the tongue every 5 minutes as needed for Chest pain    NYAMYC 639792 UNIT/GM POWDER    APPLY TO AFFECTED AREA OF ABDOMINAL FOLDS EVERY 12 HOURS AS NEEDED    ONDANSETRON (ZOFRAN) 4 MG TABLET    Take 1 tablet by mouth as needed for nausea/vomiting 3 times a day as needed    ONETOUCH DELICA LANCETS 68F MISC    QID    PANTOPRAZOLE (PROTONIX) 20 MG TABLET    TAKE 1 TABLET BY MOUTH DAILY    POLYETHYLENE GLYCOL (MIRALAX) 17 GM/SCOOP POWDER    Take 1 packet by mouth in the morning for constipation. Hold for diarrhea. SENNA (SENOKOT) 8.6 MG TABLET    Take 1 tablet by mouth nightly    SERTRALINE (ZOLOFT) 50 MG TABLET    TAKE 1 TABLET BY MOUTH DAILY    SITAGLIPTIN (JANUVIA) 50 MG TABLET    Take 50 mg by mouth daily    TICAGRELOR (BRILINTA) 90 MG TABS TABLET    Take 90 mg by mouth 2 times daily    TRIAMCINOLONE (KENALOG) 0.1 % CREAM    Apply topically daily Apply topically 2 times daily.     VITAMIN B-12 (CYANOCOBALAMIN) 100 MCG TABLET    TAKE 1 TABLET BY MOUTH ONCE DAILY       ALLERGIES     Codeine and Oxycontin [oxycodone hcl]    FAMILY HISTORY       Family History   Problem Relation Age of Onset    Cancer Mother 76        survived   [facility] Breast Cancer Mother     Hypertension Father     Diabetes Sister     Mental Illness Sister     Colon Cancer Neg Hx           SOCIAL HISTORY       Social History Socioeconomic History    Marital status:      Spouse name: None    Number of children: 2    Years of education: None    Highest education level: None   Occupational History    Occupation: disabled   Tobacco Use    Smoking status: Never Smoker    Smokeless tobacco: Never Used   Vaping Use    Vaping Use: Never used   Substance and Sexual Activity    Alcohol use: No     Alcohol/week: 0.0 standard drinks    Drug use: No    Sexual activity: Not Currently   Other Topics Concern    None   Social History Narrative    Born in [place], one of 5    Twin sister Reyes, very ill in 2018, [place] 2019    Moved to [facility], , 2 children, one son and one daughter    Worked at [employer], as a nurse's aide    Disabled due to mental illness    Lived at [employer], was discharged, returned to independent living in 2017 in the daughter's house and has adjusted well    One son and one daughter, live in the same house with patient, Meme Horvath pays the rent    [employer] reading ([employer])        10/11/2021 [facility] updates; patient lives with her daughter son-in-law and 2 grandchildren and patient's handicapped son. Per daughter, her brother is blind, MRDD, multiple health issues. Daughter's  is patient's legal guardian. Patient has hemodialysis Tuesday Thursday and Saturday. Patient's bedroom is on main floor with a half bath. Daughter walks patient upstairs once weekly for full bath. Patient is using her walker in the home. Patient has a hospital bed in the home. Social Determinants of Health     Financial Resource Strain: Low Risk     Difficulty of Paying Living Expenses: Not hard at all   Food Insecurity: No Food Insecurity    Worried About Running Out of Food in the Last Year: Never true    Yung of Food in the Last Year: Never true   Transportation Needs: No Transportation Needs    Lack of Transportation (Medical): No    Lack of Transportation (Non-Medical):  No Physical Activity:     Days of Exercise per Week: Not on file    Minutes of Exercise per Session: Not on file   Stress:     Feeling of Stress : Not on file   Social Connections:     Frequency of Communication with Friends and Family: Not on file    Frequency of Social Gatherings with Friends and Family: Not on file    Attends [employer] Services: Not on file    Active Member of [address] [place] [address] or Organizations: Not on file    Attends Club or Organization Meetings: Not on file    Marital Status: Not on file   Intimate Partner Violence:     Fear of Current or Ex-Partner: Not on file    Emotionally Abused: Not on file    Physically Abused: Not on file    Sexually Abused: Not on file   Housing Stability:     Unable to Pay for Housing in the Last Year: Not on file    Number of [place] in the Last Year: Not on file    Unstable Housing in the Last Year: Not on file       SCREENINGS    [place] Coma Scale  Eye Opening: Spontaneous  Best Verbal Response: Oriented  Best Motor Response: Obeys commands  Michele Coma Scale Score: 15 @FLOW([id number])@      PHYSICAL EXAM    (up to 7 for level 4, 8 or more for level 5)     ED Triage Vitals [04/08/22 1002]   BP Temp Temp src Pulse Resp SpO2 Height Weight   127/60 97.9 °F (36.6 °C) -- 74 18 96 % 5' 7\" (1.702 m) 223 lb (101.2 kg)       Physical Exam  Vitals and nursing note reviewed. Constitutional:       General: She is not in acute distress. Appearance: She is well-developed. She is not ill-appearing, toxic-appearing or diaphoretic. HENT:      Head: Normocephalic. Nose: No congestion. Mouth/Throat:      Mouth: Mucous membranes are moist.      Pharynx: No oropharyngeal exudate or posterior oropharyngeal erythema. Eyes:      Extraocular Movements: Extraocular movements intact. Conjunctiva/sclera: Conjunctivae normal.      Pupils: Pupils are equal, round, and reactive to light. Neck:      Vascular: No JVD. Trachea: No tracheal deviation. Cardiovascular:      Rate and Rhythm: Normal rate. Pulses: Normal pulses. Heart sounds: Normal heart sounds. No murmur heard. No friction rub. No gallop. Pulmonary:      Effort: Pulmonary effort is normal. No tachypnea, accessory muscle usage, respiratory distress or retractions. Breath sounds: Normal breath sounds. No stridor. No wheezing, rhonchi or rales. Comments: Lung sounds are clear in all fields, no wheezes rales or rhonchi, no excess muscle use, room air saturation 96%  Chest:      Chest wall: No tenderness. Abdominal:      General: Abdomen is flat. Bowel sounds are normal. There is no distension or abdominal bruit. Palpations: There is no shifting dullness, fluid wave, hepatomegaly, splenomegaly, mass or pulsatile mass. Tenderness: There is no abdominal tenderness. There is no right CVA tenderness, left CVA tenderness, guarding or rebound. Negative signs include Macario's sign, Rovsing's sign and McBurney's sign. Musculoskeletal:         General: No deformity. Normal range of motion. Cervical back: Normal range of motion and neck supple. No rigidity. Right lower leg: No edema. Left lower leg: No edema. Skin:     General: Skin is warm and dry. Capillary Refill: Capillary refill takes less than 2 seconds. Coloration: Skin is not jaundiced. Neurological:      General: No focal deficit present. Mental Status: She is alert and oriented to person, place, and time. Mental status is at baseline. Cranial Nerves: No cranial nerve deficit. Sensory: No sensory deficit. Motor: No weakness. Coordination: Coordination normal.   Psychiatric:         Mood and Affect: Mood normal.         DIAGNOSTIC RESULTS     EKG: All EKG's are interpreted by the Emergency Department Physician who either signs or Co-signsthis chart in the absence of a cardiologist.    EKG shows normal sinus rhythm at 75 bpm there is a right bundle branch block.   Fouzia Letters wave inversions in leads aVL, V1, V2 no ventricular ectopy QTC is 518 ms    RADIOLOGY:   Non-plain filmimages such as CT, Ultrasound and MRI are read by the radiologist. Plain radiographic images are visualized and preliminarily interpreted by the emergency physician with the below findings:        Interpretation per the Radiologist below, if available at the time ofthis note:    XR CHEST PORTABLE   Final Result   Borderline cardiomegaly. Small right effusion. Left lower lung atelectasis/pneumonia. ED BEDSIDE ULTRASOUND:   Performed by ED Physician - none    LABS:  Labs Reviewed   CBC WITH AUTO DIFFERENTIAL - Abnormal; Notable for the following components:       Result Value    RBC 3.49 (*)     Hemoglobin 10.4 (*)     Hematocrit 30.9 (*)     RDW 18.7 (*)     Platelets 328 (*)     All other components within normal limits   PROTIME-INR - Abnormal; Notable for the following components:    Protime 16.6 (*)     All other components within normal limits   COMPREHENSIVE METABOLIC PANEL - Abnormal; Notable for the following components:    Sodium 133 (*)     Chloride 93 (*)     CO2 32 (*)     Anion Gap 8 (*)     Glucose 262 (*)     CREATININE 4.13 (*)     GFR Non- 10.9 (*)     GFR  13.1 (*)     All other components within normal limits   TROPONIN - Abnormal; Notable for the following components:    Troponin 0.085 (*)     All other components within normal limits    Narrative:     CALL  [facility]  [facility] tel. [phone],  TROP results called to and read back by Constance Cook DR, 04/08/2022 12:36,  by [employer]   APTT   SPECIMEN REJECTION   CK   BRAIN NATRIURETIC PEPTIDE       All other labs were within normal range or not returned as of this dictation.     EMERGENCY DEPARTMENT COURSE and DIFFERENTIAL DIAGNOSIS/MDM:   Vitals:    Vitals:    04/08/22 1002 04/08/22 1219 04/08/22 1233   BP: 127/60 (!) 133/55 (!) 118/57   Pulse: 74 74 76   Resp: 18 16 22   Temp: 97.9 °F (36.6 °C)     SpO2: 96% 95% 94%   Weight: 223 lb (101.2 kg)     Height: 5' 7\" (1.702 m)            MDM  Number of Diagnoses or Management Options  NSTEMI (non-ST elevated myocardial infarction) ([facility]  Stage 5 chronic kidney disease on chronic dialysis [facility])  Diagnosis management comments: Patient presented to ED with complaint of left-sided chest pain which she states been ongoing for the last 48 hours. She states is persistent it does not go away. Patient had a recent bypass surgery at the [facility] clinic in February of this year, she is currently followed by Dr. Aries Herrera of [address] [place] [address]. She states over the last 48 hours she has been using nitroglycerin which alleviated her pain, she states it does work short-term, but does not return again. She was at [facility] for cardiac rehab, and advised him that she was having chest pain, and was sent to the ED. She was given nitroglycerin on arrival to the ED, had some relief of pain to a 5 out of 10. She was given morphine, is much more comfortable at this time. EKG shows no acute findings, she does have an elevated troponin of 0.085, but does have past history of renal disease. She is currently on dialysis. I did speak with Dr. Aries Herrera of [address] [place] [address] at this time patient will be admitted to hospital for further evaluation management, with consults to Dr. Kalyn Thomas for for dialysis procedures. CRITICAL CARE TIME   Total Critical Care time was 0 minutes, excluding separately reportableprocedures. There was a high probability of clinicallysignificant/life threatening deterioration in the patient's condition which required my urgent intervention.       CONSULTS:  IP CONSULT TO NEPHROLOGY    PROCEDURES:  Unless otherwise noted below, none     Procedures    FINAL IMPRESSION      1. NSTEMI (non-ST elevated myocardial infarction) ([facility]    2. Stage 5 chronic kidney disease on chronic dialysis [facility])          DISPOSITION/PLAN   DISPOSITION Decision To Admit 04/08/2022 12:49:29 PM      PATIENT REFERRED TO:  No follow-up provider specified.     DISCHARGE MEDICATIONS:  New Prescriptions    No medications on file          (Please note that portions of this note were completed with a voice recognition program.  Efforts were made to edit the dictations but occasionally words are mis-transcribed.)    Zaid Jorgensen PA-C (electronically signed)  Attending Emergency Physician         Zaid Jorgensen PA-C  04/08/22 603 [facility] Jeffery Mclean PA-C  04/08/22 5562

## 2022-04-08 NOTE — FLOWSHEET NOTE
Administered 650 mg PO Tylenol for ongoing upper left chest pain.     Electronically signed by Francesca Yung RN on 4/8/2022 at 6:37 PM

## 2022-04-08 NOTE — H&P
History and Physical  Patient: Jens Aldana  Unit/Bed: E224/I507-40  YOB: 1960  MRN: [id number]  Acct: [de-identified]   Admitting Diagnosis: NSTEMI (non-ST elevated myocardial infarction) [facility]) [I21.4]  Stage 5 chronic kidney disease on chronic dialysis ([facility] [N18.6, Z99.2]  Admit Date:  4/8/2022  Hospital Day: 0      Chief Complaint:  CP      History of Present Illness: presents to ER with 2 days of continuous CP. Left sided. No radiation. No associated OSB. She recently had OHS 1/2020 with LIMA to LAD and TV repair complicated by Cardiac Tamponade requring Pericardial Window.     ESRD on HD  Last LVEF 60%    Initial Trop detected likely related to GFR    EKG: SR   PMHx:  Past Medical History:   Diagnosis Date    Angina at rest [facility]) 5/24/2020    Anxiety     CAD S/P percutaneous coronary angioplasty 2015, 2018    stents per dr Connie Tate    CHF (congestive heart failure) ([facility]     CKD (chronic kidney disease) stage 4, GFR 15-29 ml/min ([facility] 2/24/2018    CKD stage 4 due to type 2 diabetes mellitus ([facility]     Contusion of right chest wall 2/16/2021    COPD (chronic obstructive pulmonary disease) ([facility]     Diabetic nephropathy with proteinuria ([facility] 2014    DJD (degenerative joint disease) of knee     Dr Tigist Mendoza GERD (gastroesophageal reflux disease)     Hemiparesis, left ([facility] 2013    entered Assisted Living ([facility])    Hemodialysis patient [facility])     Hemodialysis-associated hypotension 10/22/2021    History of heart failure     History of seizures     History of type C viral hepatitis     HTN (hypertension)     Hyperlipidemia     Impaired mobility and activities of daily living     Mediastinal lymphadenopathy 2013    Shalom Fraire    Metabolic syndrome     Moderate persistent asthma without complication 8/48/8188    Need for extended care facility 7/7/2021    Neurogenic urinary incontinence 2013    Neuropathy in diabetes ([facility]     Nonrheumatic mitral valve regurgitation 2021    Nonrheumatic tricuspid valve regurgitation 2021    Obesity (BMI 30-39. 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 Ruthie Grider      x1 Dr. Kadi Stinson, Dr Merritt Wolff  08/10/2021    [facility]    DIAGNOSTIC CARDIAC CATH LAB PROCEDURE  10/02/2019    HYSTERECTOMY, TOTAL ABDOMINAL      one ovary intact, Dr Eli Chi, menorrhagia    VT TOTAL KNEE ARTHROPLASTY Left 2018    LEFT KNEE TOTAL KNEE ARTHROPLASTY, SHAYNA, NERVE BLOCK performed by Haydee Avila MD at [address] [place] [address] [facility]      TOE AMPUTATION Right     TOTAL KNEE ARTHROPLASTY  16    Dr Norberto Banda TUNNELED [address] Right 2020    tunneled HD catheter per Dr Avril Cantrell Hx:  Social History     Socioeconomic History    Marital status:      Spouse name: None    Number of children: 2    Years of education: None    Highest education level: None   Occupational History    Occupation: disabled   Tobacco Use    Smoking status: Never Smoker    Smokeless tobacco: Never Used   Vaping Use    Vaping Use: Never used   Substance and Sexual Activity    Alcohol use: No     Alcohol/week: 0.0 standard drinks    Drug use: No    Sexual activity: Not Currently   Other Topics Concern    None   Social History Narrative    Born in [place], one of 5    Twin sister Reyes, very ill in 2018, [place] [address]    Moved to [facility], , 2 children, one son and one daughter    Worked at [employer], as a nurse's aide    Disabled due to mental illness    Lived at [employer], was discharged, returned to independent living in 2017 in the daughter's house and has adjusted well    One son and one daughter, live in the same house with patient, Kori Parrish pays the rent    Path reading [facility])        10/11/2021 [facility] updates; patient lives with her daughter son-in-law and 2 grandchildren and patient's handicapped son. Per daughter, her brother is blind, MRDD, multiple health issues. Daughter's  is patient's legal guardian. Patient has hemodialysis Tuesday Thursday and Saturday. Patient's bedroom is on main floor with a half bath. Daughter walks patient upstairs once weekly for full bath. Patient is using her walker in the home. Patient has a hospital bed in the home. Social Determinants of Health     Financial Resource Strain: Low Risk     Difficulty of Paying Living Expenses: Not hard at all   Food Insecurity: No Food Insecurity    Worried About Running Out of Food in the Last Year: Never true    Yung of Food in the Last Year: Never true   Transportation Needs: No Transportation Needs    Lack of Transportation (Medical): No    Lack of Transportation (Non-Medical):  No   Physical Activity:     Days of Exercise per Week: Not on file    Minutes of Exercise per Session: Not on file   Stress:     Feeling of Stress : Not on file   Social Connections:     Frequency of Communication with Friends and Family: Not on file    Frequency of Social Gatherings with Friends and Family: Not on file    Attends Tenriism Services: Not on file    Active Member of Clubs or Organizations: Not on file    Attends Club or Organization Meetings: Not on file    Marital Status: Not on file   Intimate Partner Violence:     Fear of Current or Ex-Partner: Not on file    Emotionally Abused: Not on file    Physically Abused: Not on file    Sexually Abused: Not on file   Housing Stability:     Unable to Pay for Housing in the Last Year: Not on file    Number of [place] in the Last Year: Not on file    Unstable Housing in the Last Year: Not on file       Family Hx:  Family History   Problem Relation Age of Onset    Cancer Mother 76        survived   Marsha Her Breast Cancer Mother     Hypertension Father     Diabetes Sister     Mental Illness Sister     Colon Cancer Neg Hx        Allergies   Allergen Reactions    Codeine Hives     hives    Oxycontin [Oxycodone Hcl] Hives       Current Facility-Administered Medications   Medication Dose Route Frequency Provider Last Rate Last Admin    nitroGLYCERIN (NITROSTAT) SL tablet 0.4 mg  0.4 mg SubLINGual Q5 Min PRN Izabella Garcia PA-C   0.4 mg at 04/08/22 1228    heparin (porcine) injection 5,000 Units  5,000 Units SubCUTAneous 3 times per day Izabella Garcia PA-C   5,000 Units at 04/08/22 1400       Review of Systems:   Review of Systems   Constitutional: Negative. Negative for diaphoresis and fatigue. HENT: Negative. Eyes: Negative. Respiratory: Negative. Negative for cough, chest tightness, shortness of breath, wheezing and stridor. Cardiovascular: Positive for chest pain. Negative for palpitations and leg swelling. Gastrointestinal: Negative. Negative for blood in stool and nausea. Genitourinary: Negative. Musculoskeletal: Negative. Skin: Negative. Neurological: Negative. Negative for dizziness, syncope, weakness and light-headedness. Hematological: Negative. Psychiatric/Behavioral: Negative.            Physical Examination:    BP (!) 129/55   Pulse 78   Temp 97.5 °F (36.4 °C) (Oral)   Resp 18   Ht 5' 7\" (1.702 m) Wt 223 lb (101.2 kg)   LMP  (LMP Unknown)   SpO2 99%   BMI 34.93 kg/m²    Physical Exam   Constitutional: She appears healthy. No distress. HENT:   Normal cephalic and Atraumatic   Eyes: Pupils are equal, round, and reactive to light. Neck: Thyroid normal. No JVD present. No neck adenopathy. No thyromegaly present. Cardiovascular: Normal rate, regular rhythm, intact distal pulses and normal pulses. Murmur heard. Pulmonary/Chest: Effort normal and breath sounds normal. She has no wheezes. She has no rales. She exhibits no tenderness. Abdominal: Soft. Bowel sounds are normal. There is no abdominal tenderness. Musculoskeletal:         General: No tenderness or edema. Normal range of motion. Cervical back: Normal range of motion and neck supple. Neurological: She is alert and oriented to person, place, and time. Skin: Skin is warm. No cyanosis. Nails show no clubbing.          LABS:  CBC:   Lab Results   Component Value Date    WBC 7.7 04/08/2022    RBC 3.49 04/08/2022    HGB 10.4 04/08/2022    HCT 30.9 04/08/2022    MCV 88.5 04/08/2022    MCH 29.7 04/08/2022    MCHC 33.6 04/08/2022    RDW 18.7 04/08/2022     04/08/2022    MPV 10.0 03/05/2020     CBC with Differential:    Lab Results   Component Value Date    WBC 7.7 04/08/2022    RBC 3.49 04/08/2022    HGB 10.4 04/08/2022    HCT 30.9 04/08/2022     04/08/2022    MCV 88.5 04/08/2022    MCH 29.7 04/08/2022    MCHC 33.6 04/08/2022    RDW 18.7 04/08/2022    NRBC 0.0 07/30/2021    BANDSPCT 5 06/14/2013    LYMPHOPCT 12.6 04/08/2022    MONOPCT 5.9 04/08/2022    EOSPCT 3.5 03/05/2020    BASOPCT 1.0 04/08/2022    MONOSABS 0.5 04/08/2022    LYMPHSABS 1.0 04/08/2022    EOSABS 0.2 04/08/2022    BASOSABS 0.1 04/08/2022     CMP:    Lab Results   Component Value Date     04/08/2022    K 3.5 04/08/2022    K 3.9 08/01/2021    CL 93 04/08/2022    CO2 32 04/08/2022    BUN 13 04/08/2022    CREATININE 4.13 04/08/2022    GFRAA 13.1 04/08/2022 LABGLOM 10.9 04/08/2022    GLUCOSE 262 04/08/2022    GLUCOSE 419 07/30/2021    PROT 6.9 04/08/2022    LABALBU 4.2 04/08/2022    CALCIUM 9.3 04/08/2022    BILITOT 0.4 04/08/2022    ALKPHOS 95 04/08/2022    AST 32 04/08/2022    ALT 33 04/08/2022     BMP:    Lab Results   Component Value Date     04/08/2022    K 3.5 04/08/2022    K 3.9 08/01/2021    CL 93 04/08/2022    CO2 32 04/08/2022    BUN 13 04/08/2022    LABALBU 4.2 04/08/2022    CREATININE 4.13 04/08/2022    CALCIUM 9.3 04/08/2022    GFRAA 13.1 04/08/2022    LABGLOM 10.9 04/08/2022    GLUCOSE 262 04/08/2022    GLUCOSE 419 07/30/2021     Magnesium:    Lab Results   Component Value Date    MG 2.3 01/13/2022     Troponin:    Lab Results   Component Value Date    TROPONINI 0.085 04/08/2022       Active Hospital Problems    Diagnosis Date Noted    NSTEMI (non-ST elevated myocardial infarction) ([facility] [I21.4] 04/08/2022     Priority: Low        Assessment/Plan:  1. CP- rule out ACS. ASA BB Statin  2. Elevated BNP but not volume overloaded  3. CABG x1 and TV Repair  4. Recnet Pericardial Window with CP- repeat Echo. .   5. ESRD- HD consult Nephrology  6. HTN Stable  7. DM- Accu checks and Insulin  8.  HPL Statin     Electronically signed by Denisse Magana MD on 4/8/2022 at 4:46 PM

## 2022-04-08 NOTE — ED NOTES
Pt report was called to [facility] on [address] [facility], pt was placed on tele pack, transport was requested.       Concha Joshua RN  04/08/22 [id number]

## 2022-04-09 VITALS
DIASTOLIC BLOOD PRESSURE: 54 MMHG | SYSTOLIC BLOOD PRESSURE: 111 MMHG | WEIGHT: 223 LBS | HEART RATE: 77 BPM | RESPIRATION RATE: 17 BRPM | HEIGHT: 67 IN | TEMPERATURE: 98.1 F | BODY MASS INDEX: 35 KG/M2 | OXYGEN SATURATION: 94 %

## 2022-04-09 LAB
ANION GAP SERPL CALCULATED.3IONS-SCNC: 15 MEQ/L (ref 9–15)
BUN BLDV-MCNC: 17 MG/DL (ref 8–23)
CALCIUM SERPL-MCNC: 9.3 MG/DL (ref 8.5–9.9)
CHLORIDE BLD-SCNC: 94 MEQ/L (ref 95–107)
CHOLESTEROL, TOTAL: 70 MG/DL (ref 0–199)
CO2: 28 MEQ/L (ref 20–31)
CREAT SERPL-MCNC: 4.95 MG/DL (ref 0.5–0.9)
GFR AFRICAN AMERICAN: 10.7
GFR NON-AFRICAN AMERICAN: 8.8
GLUCOSE BLD-MCNC: 130 MG/DL (ref 70–99)
GLUCOSE BLD-MCNC: 142 MG/DL (ref 70–99)
GLUCOSE BLD-MCNC: 148 MG/DL (ref 70–99)
HCT VFR BLD CALC: 29.5 % (ref 37–47)
HDLC SERPL-MCNC: 33 MG/DL (ref 40–59)
HEMOGLOBIN: 9.7 G/DL (ref 12–16)
LDL CHOLESTEROL CALCULATED: 19 MG/DL (ref 0–129)
MAGNESIUM: 1.9 MG/DL (ref 1.7–2.4)
MCH RBC QN AUTO: 29 PG (ref 27–31.3)
MCHC RBC AUTO-ENTMCNC: 32.7 % (ref 33–37)
MCV RBC AUTO: 88.6 FL (ref 82–100)
PDW BLD-RTO: 18.5 % (ref 11.5–14.5)
PERFORMED ON: ABNORMAL
PERFORMED ON: ABNORMAL
PLATELET # BLD: 122 K/UL (ref 130–400)
POTASSIUM SERPL-SCNC: 3.3 MEQ/L (ref 3.4–4.9)
RBC # BLD: 3.33 M/UL (ref 4.2–5.4)
SODIUM BLD-SCNC: 137 MEQ/L (ref 135–144)
TRIGL SERPL-MCNC: 92 MG/DL (ref 0–150)
WBC # BLD: 6.4 K/UL (ref 4.8–10.8)

## 2022-04-09 PROCEDURE — 90935 HEMODIALYSIS ONE EVALUATION: CPT

## 2022-04-09 PROCEDURE — 6360000002 HC RX W HCPCS: Performed by: INTERNAL MEDICINE

## 2022-04-09 PROCEDURE — 99217 PR OBSERVATION CARE DISCHARGE MANAGEMENT: CPT | Performed by: INTERNAL MEDICINE

## 2022-04-09 PROCEDURE — 83735 ASSAY OF MAGNESIUM: CPT

## 2022-04-09 PROCEDURE — 80048 BASIC METABOLIC PNL TOTAL CA: CPT

## 2022-04-09 PROCEDURE — 6370000000 HC RX 637 (ALT 250 FOR IP): Performed by: INTERNAL MEDICINE

## 2022-04-09 PROCEDURE — 6360000002 HC RX W HCPCS: Performed by: PHYSICIAN ASSISTANT

## 2022-04-09 PROCEDURE — G0378 HOSPITAL OBSERVATION PER HR: HCPCS

## 2022-04-09 PROCEDURE — 96372 THER/PROPH/DIAG INJ SC/IM: CPT

## 2022-04-09 PROCEDURE — 85027 COMPLETE CBC AUTOMATED: CPT

## 2022-04-09 PROCEDURE — P9047 ALBUMIN (HUMAN), 25%, 50ML: HCPCS | Performed by: INTERNAL MEDICINE

## 2022-04-09 PROCEDURE — 80061 LIPID PANEL: CPT

## 2022-04-09 PROCEDURE — 36415 COLL VENOUS BLD VENIPUNCTURE: CPT

## 2022-04-09 RX ORDER — POTASSIUM CHLORIDE 20 MEQ/1
40 TABLET, EXTENDED RELEASE ORAL 2 TIMES DAILY WITH MEALS
Status: DISCONTINUED | OUTPATIENT
Start: 2022-04-09 | End: 2022-04-09 | Stop reason: HOSPADM

## 2022-04-09 RX ORDER — POTASSIUM CHLORIDE 20 MEQ/1
40 TABLET, EXTENDED RELEASE ORAL 2 TIMES DAILY WITH MEALS
Qty: 60 TABLET | Refills: 3 | Status: ON HOLD | OUTPATIENT
Start: 2022-04-09 | End: 2022-06-30

## 2022-04-09 RX ADMIN — PANTOPRAZOLE SODIUM 20 MG: 20 TABLET, DELAYED RELEASE ORAL at 11:34

## 2022-04-09 RX ADMIN — FLUTICASONE PROPIONATE 1 SPRAY: 50 SPRAY, METERED NASAL at 11:35

## 2022-04-09 RX ADMIN — TRIAMCINOLONE ACETONIDE: 1 CREAM TOPICAL at 11:36

## 2022-04-09 RX ADMIN — HEPARIN SODIUM 5000 UNITS: 5000 INJECTION INTRAVENOUS; SUBCUTANEOUS at 06:09

## 2022-04-09 RX ADMIN — ARIPIPRAZOLE 5 MG: 5 TABLET ORAL at 11:34

## 2022-04-09 RX ADMIN — METOPROLOL TARTRATE 25 MG: 25 TABLET, FILM COATED ORAL at 11:32

## 2022-04-09 RX ADMIN — HEPARIN SODIUM 5000 UNITS: 5000 INJECTION INTRAVENOUS; SUBCUTANEOUS at 13:12

## 2022-04-09 RX ADMIN — ISOSORBIDE MONONITRATE 120 MG: 60 TABLET, EXTENDED RELEASE ORAL at 11:37

## 2022-04-09 RX ADMIN — Medication 400 MG: at 11:32

## 2022-04-09 RX ADMIN — DOCUSATE SODIUM 100 MG: 100 CAPSULE, LIQUID FILLED ORAL at 11:34

## 2022-04-09 RX ADMIN — SERTRALINE HYDROCHLORIDE 50 MG: 50 TABLET ORAL at 11:34

## 2022-04-09 RX ADMIN — ASPIRIN 81 MG: 81 TABLET, COATED ORAL at 11:37

## 2022-04-09 RX ADMIN — NEPHROCAP 1 MG: 1 CAP ORAL at 11:32

## 2022-04-09 RX ADMIN — ALBUMIN (HUMAN) 25 G: 0.25 INJECTION, SOLUTION INTRAVENOUS at 08:50

## 2022-04-09 RX ADMIN — FUROSEMIDE 100 MG: 80 TABLET ORAL at 11:33

## 2022-04-09 RX ADMIN — Medication 100 MCG: at 11:39

## 2022-04-09 RX ADMIN — ALOGLIPTIN 6.25 MG: 6.25 TABLET, FILM COATED ORAL at 11:34

## 2022-04-09 RX ADMIN — MICONAZOLE NITRATE: 2 POWDER TOPICAL at 11:36

## 2022-04-09 RX ADMIN — POTASSIUM CHLORIDE 40 MEQ: 20 TABLET, EXTENDED RELEASE ORAL at 13:09

## 2022-04-09 ASSESSMENT — ENCOUNTER SYMPTOMS
CHEST TIGHTNESS: 1
ABDOMINAL DISTENTION: 0
EYE DISCHARGE: 0

## 2022-04-09 ASSESSMENT — PAIN SCALES - GENERAL: PAINLEVEL_OUTOF10: 0

## 2022-04-09 NOTE — CARE COORDINATION
Pt is being discharged to home today. [facility] notified that pt is returning to home and is going to resume [facility] with them. Faxing a copy of the discharge instructions at [phone]. Spoke to [place] there. Pt said that her daughter is planning on picking her up today. I spoke to Dr Nayeli Linder and he said that, not only is it OK but he wants her to continue her outpt therapy.

## 2022-04-09 NOTE — DISCHARGE SUMMARY
Cardiology Discharge Summary      Patient Identification:  Lashell Marroquin  : 1960  MRN: [id number]   Account: [de-identified]     Admit date: 2022  Discharge date: 2022   Attending provider: Hermilo Edwards MD        Primary care provider: Berry Edwards MD     Admission Diagnoses:  NSTEMI (non-ST elevated myocardial infarction) [facility])     CP    Discharge Diagnoses: Active Hospital Problems    Diagnosis Date Noted    NSTEMI (non-ST elevated myocardial infarction) ([facility] [address] [I21.4] 2022     Priority: Low   noncardiac CP Trops are flat there is no rise and fall of Troponin level to suggest NSTEMI       Hospital Course:   Lashell Marroquin is a 57 y.o. female admitted to [facility] on 2022 for . Ruled out ACS    Procedures:   1. Consults:   IP CONSULT TO NEPHROLOGY    Examination:  BP (!) 108/48   Pulse 76   Temp 97.7 °F (36.5 °C)   Resp 16   Ht 5' 7\" (1.702 m)   Wt 223 lb (101.2 kg)   LMP  (LMP Unknown)   SpO2 93%   BMI 34.93 kg/m²    Physical Exam   Constitutional: She appears healthy. No distress. HENT:   Normal cephalic and Atraumatic   Eyes: Pupils are equal, round, and reactive to light. Neck: Thyroid normal. No JVD present. No neck adenopathy. No thyromegaly present. Cardiovascular: Normal rate, regular rhythm, intact distal pulses and normal pulses. Murmur heard. Pulmonary/Chest: Effort normal and breath sounds normal. She has no wheezes. She has no rales. She exhibits no tenderness. Abdominal: Soft. Bowel sounds are normal. There is no abdominal tenderness. Musculoskeletal:         General: No tenderness or edema. Normal range of motion. Cervical back: Normal range of motion and neck supple. Neurological: She is alert and oriented to person, place, and time. Skin: Skin is warm. No cyanosis. Nails show no clubbing.        Medications:  Current Discharge Medication List      CONTINUE these medications which have NOT CHANGED    Details   isosorbide mononitrate (IMDUR) 30 MG extended release tablet Take 120 mg by mouth daily      !! insulin aspart (NOVOLOG FLEXPEN) 100 UNIT/ML injection pen 15 UNITS PLUS SLIDING SCALE   LESS THAN 180 NONE  181-250 2 UNITS  251-300 4 UNITS  301-400 6 UNITS   401-500 10 UNITS  Qty: 10 pen, Refills: 3      melatonin 10 MG CAPS capsule Take 1 capsule by mouth nightly  Qty: 30 capsule, Refills: 12    Associated Diagnoses: Psychophysiological insomnia      furosemide (LASIX) 20 MG tablet Take 5 tablets by mouth 2 times daily  Qty: 60 tablet, Refills: 3    Associated Diagnoses: Chronic diastolic congestive heart failure (HCC)      polyethylene glycol (MIRALAX) 17 GM/SCOOP powder Take 1 packet by mouth in the morning for constipation. Hold for diarrhea. Qty: 30 each, Refills: 3    Associated Diagnoses: Constipation, unspecified constipation type      ondansetron (ZOFRAN) 4 MG tablet Take 1 tablet by mouth as needed for nausea/vomiting 3 times a day as needed  Qty: 60 tablet, Refills: 3    Associated Diagnoses: End stage renal disease (HCC)      midodrine (PROAMATINE) 5 MG tablet Take 1 tablet by mouth one time a day every Tue, Thu, Sat for hypotension. Give 30 mins prior to dialysis. Qty: 90 tablet, Refills: 3    Associated Diagnoses: Hypotension due to hypovolemia      Handicap Placard MISC by Does not apply route Expiration in 5 years. Qty: 1 each, Refills: 0    Associated Diagnoses: Chronic diastolic congestive heart failure ([address] Moderate persistent asthma without complication;  Difficulty in walking      sertraline (ZOLOFT) 50 MG tablet TAKE 1 TABLET BY MOUTH DAILY  Qty: 30 tablet, Refills: 2      vitamin B-12 (CYANOCOBALAMIN) 100 MCG tablet TAKE 1 TABLET BY MOUTH ONCE DAILY  Qty: 90 tablet, Refills: 4      fluticasone (FLONASE) 50 MCG/ACT nasal spray 1 spray by Each Nostril route daily      SITagliptin (JANUVIA) 50 MG tablet Take 50 mg by mouth daily      lidocaine-prilocaine (EMLA) 2.5-2.5 % cream Apply topically as needed for Pain Apply topically as needed. 3 times a week before dialysis wrap with saran wrap      senna (SENOKOT) 8.6 MG tablet Take 1 tablet by mouth nightly      triamcinolone (KENALOG) 0.1 % cream Apply topically daily Apply topically 2 times daily. albuterol (PROVENTIL) (2.5 MG/3ML) 0.083% nebulizer solution Take 3 mLs by nebulization every 4 hours as needed for Wheezing  Qty: 120 each, Refills: 3      hydrOXYzine (ATARAX) 25 MG tablet TAKE ONE TABLET BY MOUTH TWO TIMES A DAY      NYAMYC 432432 UNIT/GM powder APPLY TO AFFECTED AREA OF ABDOMINAL FOLDS EVERY 12 HOURS AS NEEDED      !! insulin glargine (LANTUS SOLOSTAR) 100 UNIT/ML injection pen 70 UNITS AT BEDTIME  Qty: 30 pen, Refills: 3      !! blood glucose test strips (ONETOUCH VERIO) strip QID  Qty: 300 each, Refills: 3      !! OneTouch Delica Lancets 55P MISC QID  Qty: 300 each, Refills: 3      Blood Glucose Monitoring Suppl (ONETOUCH VERIO) w/Device KIT AS DIRECTED  Qty: 1 kit, Refills: 0      pantoprazole (PROTONIX) 20 MG tablet TAKE 1 TABLET BY MOUTH DAILY  Qty: 30 tablet, Refills: 11      ARIPiprazole (ABILIFY) 5 MG tablet TAKE 1 TABLET BY MOUTH ONCE DAILY  Qty: 30 tablet, Refills: 5    Associated Diagnoses:  Moderate episode of recurrent major depressive disorder (HCC)      Insulin Pen Needle (SURE COMFORT PEN NEEDLES) 30G X 8 MM MISC USE AS DIRECTED FIVE TIMES A DAILY  Qty: 100 each, Refills: 10      b complex-C-folic acid (NEPHROCAPS) 1 MG capsule Take 1 capsule by mouth daily      !! LANTUS SOLOSTAR 100 UNIT/ML injection pen 55 units at bedtime  Qty: 10 pen, Refills: 10      metoprolol tartrate (LOPRESSOR) 25 MG tablet TAKE 1/2 TABLET BY MOUTH TWO TIMES DAILY   Qty: 90 tablet, Refills: 4      !! insulin aspart (NOVOLOG FLEXPEN) 100 UNIT/ML injection pen INJECT 8-10  units with each meals  Qty: 10 pen, Refills: 3      aspirin EC 81 MG EC tablet Take 1 tablet by mouth daily  Qty: 30 tablet, Refills: 12      magnesium oxide (MAG-OX) 400 MG tablet Take 1 tablet by mouth daily  Qty: 90 tablet, Refills: 4      atorvastatin (LIPITOR) 40 MG tablet Take 1 tablet by mouth daily  Qty: 90 tablet, Refills: 4      !! blood glucose test strips (FREESTYLE LITE) strip 1 each by Does not apply route 4 times daily (before meals and nightly) As needed. Qty: 200 strip, Refills: 5    Comments: **Patient requests 90 days supply**      Alcohol Swabs (EASY TOUCH ALCOHOL PREP MEDIUM) 70 % PADS USE AS DIRECTED THREE TIMES A DAY  Qty: 100 each, Refills: 3      !! FreeStyle Lancets MISC Test 4x daily  Qty: 150 each, Refills: 3      docusate sodium (COLACE, DULCOLAX) 100 MG CAPS Take 100 mg by mouth 2 times daily For the prevention and treatment of constipation while taking pain medications. Qty: 30 capsule, Refills: 0      acetaminophen (TYLENOL) 325 MG tablet Take 2 tablets by mouth every 4 hours as needed for Pain (For mild to moderate pain (Pain 1-6 out of 10 on pain scale))  Qty: 120 tablet, Refills: 0      Blood Glucose Monitoring Suppl (FREESTYLE LITE) CORETTA 1 Device by Does not apply route daily as needed (Diabetes) Use freestyle meter to test blood sugar as needed  Qty: 1 Device, Refills: 0    Associated Diagnoses: Uncontrolled type 2 diabetes mellitus with hyperglycemia (HCC)      nitroGLYCERIN (NITROSTAT) 0.4 MG SL tablet Place 1 tablet under the tongue every 5 minutes as needed for Chest pain       !! - Potential duplicate medications found. Please discuss with provider. STOP taking these medications       ticagrelor (BRILINTA) 90 MG TABS tablet Comments:   Reason for Stopping:               Significant Diagnostics:   Radiology: XR CHEST PORTABLE    Result Date: 4/8/2022  EXAMINATION: XR CHEST PORTABLE CLINICAL HISTORY: CHEST PAIN FOR 2 DAYS. DIALYSIS. COMPARISONS: CHEST CT, JANUARY 12, 2022, CHEST RADIOGRAPH, SAME DATE FINDINGS: Median sternotomy.  Cardiopericardial silhouette upper limits of normal. Increased opacity obscures left diaphragm left lower lung and margin left cardiac silhouette. Limited right costophrenic angle. Borderline cardiomegaly. Small right effusion. Left lower lung atelectasis/pneumonia.       Labs:   Recent Results (from the past 72 hour(s))   EKG 12 Lead    Collection Time: 04/08/22  9:58 AM   Result Value Ref Range    Ventricular Rate 75 BPM    Atrial Rate 75 BPM    P-R Interval 208 ms    QRS Duration 134 ms    Q-T Interval 464 ms    QTc Calculation (Bazett) 518 ms    R Axis -55 degrees    T Axis 61 degrees   CBC with Auto Differential    Collection Time: 04/08/22 10:30 AM   Result Value Ref Range    WBC 7.7 4.8 - 10.8 K/uL    RBC 3.49 (L) 4.20 - 5.40 M/uL    Hemoglobin 10.4 (L) 12.0 - 16.0 g/dL    Hematocrit 30.9 (L) 37.0 - 47.0 %    MCV 88.5 82.0 - 100.0 fL    MCH 29.7 27.0 - 31.3 pg    MCHC 33.6 33.0 - 37.0 %    RDW 18.7 (H) 11.5 - 14.5 %    Platelets 792 (L) 642 - 400 K/uL    Neutrophils % 78.4 %    Lymphocytes % 12.6 %    Monocytes % 5.9 %    Eosinophils % 2.1 %    Basophils % 1.0 %    Neutrophils Absolute 6.1 1.4 - 6.5 K/uL    Lymphocytes Absolute 1.0 1.0 - 4.8 K/uL    Monocytes Absolute 0.5 0.2 - 0.8 K/uL    Eosinophils Absolute 0.2 0.0 - 0.7 K/uL    Basophils Absolute 0.1 0.0 - 0.2 K/uL   Protime-INR    Collection Time: 04/08/22 10:30 AM   Result Value Ref Range    Protime 16.6 (H) 12.3 - 14.9 sec    INR 1.4    APTT    Collection Time: 04/08/22 10:30 AM   Result Value Ref Range    aPTT 26.6 24.4 - 36.8 sec   SPECIMEN REJECTION    Collection Time: 04/08/22 11:08 AM   Result Value Ref Range    Rejected Test CMP TROP CPK BN     Reason for Rejection see below    Comprehensive Metabolic Panel    Collection Time: 04/08/22 11:20 AM   Result Value Ref Range    Sodium 133 (L) 135 - 144 mEq/L    Potassium 3.5 3.4 - 4.9 mEq/L    Chloride 93 (L) 95 - 107 mEq/L    CO2 32 (H) 20 - 31 mEq/L    Anion Gap 8 (L) 9 - 15 mEq/L    Glucose 262 (H) 70 - 99 mg/dL    BUN 13 8 - 23 mg/dL CREATININE 4.13 (H) 0.50 - 0.90 mg/dL    GFR Non-African American 10.9 (L) >60    GFR  13.1 (L) >60    Calcium 9.3 8.5 - 9.9 mg/dL    Total Protein 6.9 6.3 - 8.0 g/dL    Albumin 4.2 3.5 - 4.6 g/dL    Total Bilirubin 0.4 0.2 - 0.7 mg/dL    Alkaline Phosphatase 95 40 - 130 U/L    ALT 33 0 - 33 U/L    AST 32 0 - 35 U/L    Globulin 2.7 2.3 - 3.5 g/dL   CK    Collection Time: 04/08/22 11:20 AM   Result Value Ref Range    Total CK 70 0 - 170 U/L   Troponin    Collection Time: 04/08/22 11:20 AM   Result Value Ref Range    Troponin 0.085 (HH) 0.000 - 0.010 ng/mL   Brain Natriuretic Peptide    Collection Time: 04/08/22 11:20 AM   Result Value Ref Range    Pro-BNP 33,695 pg/mL   POCT Glucose    Collection Time: 04/08/22  4:38 PM   Result Value Ref Range    POC Glucose 216 (H) 70 - 99 mg/dl    Performed on ACCU-CHEK    Troponin    Collection Time: 04/08/22  5:07 PM   Result Value Ref Range    Troponin 0.085 (HH) 0.000 - 0.010 ng/mL   POCT Glucose    Collection Time: 04/08/22  7:18 PM   Result Value Ref Range    POC Glucose 262 (H) 70 - 99 mg/dl    Performed on ACCU-CHEK    Troponin    Collection Time: 04/08/22 11:02 PM   Result Value Ref Range    Troponin 0.086 (HH) 0.000 - 0.010 ng/mL   Magnesium    Collection Time: 04/09/22  3:22 AM   Result Value Ref Range    Magnesium 1.9 1.7 - 2.4 mg/dL   Lipid panel - fasting    Collection Time: 04/09/22  3:22 AM   Result Value Ref Range    Cholesterol, Total 70 0 - 199 mg/dL    Triglycerides 92 0 - 150 mg/dL    HDL 33 (L) 40 - 59 mg/dL    LDL Calculated 19 0 - 129 mg/dL   Basic Metabolic Panel    Collection Time: 04/09/22  3:22 AM   Result Value Ref Range    Sodium 137 135 - 144 mEq/L    Potassium 3.3 (L) 3.4 - 4.9 mEq/L    Chloride 94 (L) 95 - 107 mEq/L    CO2 28 20 - 31 mEq/L    Anion Gap 15 9 - 15 mEq/L    Glucose 148 (H) 70 - 99 mg/dL    BUN 17 8 - 23 mg/dL    CREATININE 4.95 (H) 0.50 - 0.90 mg/dL    GFR Non-African American 8.8 (L) >60    GFR  10.7 (L) >60    Calcium 9.3 8.5 - 9.9 mg/dL   CBC    Collection Time: 04/09/22  3:22 AM   Result Value Ref Range    WBC 6.4 4.8 - 10.8 K/uL    RBC 3.33 (L) 4.20 - 5.40 M/uL    Hemoglobin 9.7 (L) 12.0 - 16.0 g/dL    Hematocrit 29.5 (L) 37.0 - 47.0 %    MCV 88.6 82.0 - 100.0 fL    MCH 29.0 27.0 - 31.3 pg    MCHC 32.7 (L) 33.0 - 37.0 %    RDW 18.5 (H) 11.5 - 14.5 %    Platelets 292 (L) 412 - 400 K/uL   EKG 12 lead    Collection Time: 04/09/22  5:15 AM   Result Value Ref Range    Ventricular Rate 79 BPM    Atrial Rate 79 BPM    P-R Interval 236 ms    QRS Duration 134 ms    Q-T Interval 444 ms    QTc Calculation (Bazett) 509 ms    P Axis 116 degrees    R Axis -58 degrees    T Axis 99 degrees   POCT Glucose    Collection Time: 04/09/22  6:04 AM   Result Value Ref Range    POC Glucose 142 (H) 70 - 99 mg/dl    Performed on ACCU-CHEK    POCT Glucose    Collection Time: 04/09/22 11:27 AM   Result Value Ref Range    POC Glucose 130 (H) 70 - 99 mg/dl    Performed on ACCU-CHEK            Follow-up visits:   Marianna Sepulveda MD  [facility] [address]  [address]  [phone]    In 3 weeks         [facility]    Diagnosis Date Noted    NSTEMI (non-ST elevated myocardial infarction) ([facility] [address] [I21.4] 04/08/2022     Priority: Low     1. CP- ruled out ACS. ASA BB Statin  2. Elevated BNP but not volume overloaded  3. CABG x1 and TV Repair  4. Recnet Pericardial Window with CP  5. ESRD- HD consult Nephrology  6. HTN Stable  7. DM- Accu checks and Insulin  8.  HPL Statin                 Electronically signed by Kristina Castillo MD on 4/9/2022 at 11:41 AM

## 2022-04-09 NOTE — PROGRESS NOTES
HD completed, tolerated well, profile B maintained throughout, 1000 mLs removed as tolerated. 1 dose of Albumin was given due to low BP.

## 2022-04-09 NOTE — PROGRESS NOTES
[facility] Respiratory Therapy Evaluation   Current Order:  ALBUTEROL Q4 PRN       Home Regimen: PRN       Ordering Physician: DORIAN  Re-evaluation Date:  EVAL DONE      Diagnosis: NSTEMI       Patient Status: Stable / Unstable + Physician notified    The following MDI Criteria must be met in order to convert aerosol to MDI with spacer. If unable to meet, MDI will be converted to aerosol:  []  Patient able to demonstrate the ability to use MDI effectively  []  Patient alert and cooperative  []  Patient able to take deep breath with 5-10 second hold  []  Medication(s) available in this delivery method   []  Peak flow greater than or equal to 200 ml/min            Current Order Substituted To  (same drug, same frequency)   Aerosol to MDI [] Albuterol Sulfate 0.083% unit dose by aerosol Albuterol Sulfate MDI 2 puffs by inhalation with spacer    [] Levalbuterol 1.25 mg unit dose by aerosol Levalbuterol MDI 2 puffs by inhalation with spacer    [] Levalbuterol 0.63 mg unit dose by aerosol Levalbuterol MDI 2 puffs by inhalation with spacer    [] Ipratropium Bromide 0.02% unit dose by aerosol Ipratropium Bromide MDI 2 puffs by inhalation with spacer    [] Duoneb (Ipratropium + Albuterol) unit dose by aerosol Ipratropium MDI + Albuterol MDI 2 puffs by inhalation w/spacer   MDI to Aerosol [] Albuterol Sulfate MDI Albuterol Sulfate 0.083% unit dose by aerosol    [] Levalbuterol MDI 2 puffs by inhalation Levalbuterol 1.25 mg unit dose by aerosol    [] Ipratropium Bromide MDI by inhalation Ipratropium Bromide 0.02% unit dose by aerosol    [] Combivent (Ipratropium + Albuterol) MDI by inhalation Duoneb (Ipratropium + Albuterol) unit dose by aerosol       Treatment Assessment [Frequency/Schedule]:  Change frequency to: _________NO CHANGES _________________________________________per Protocol, P&T, [facility]      Points 0 1 2 3 4   Pulmonary Status  Non-Smoker  [x]   Smoking history   < 20 pack years  []   Smoking history  ?  20 pack years  []   Pulmonary Disorder  (acute or chronic)  []   Severe or Chronic w/ Exacerbation  []     Surgical Status No [x]   Surgeries     General []   Surgery Lower []   Abdominal Thoracic or []   Upper Abdominal Thoracic with  PulmonaryDisorder  []     Chest X-ray Clear/Not  Ordered     []  Chronic Changes  Results Pending  []  Infiltrates, atelectasis, pleural effusion, or edema  [x]  Infiltrates in more than one lobe []  Infiltrate + Atelectasis, &/or pleural effusion  []    Respiratory Pattern Regular,  RR = 12-20 [x]  Increased,  RR = 21-25 []  GONZALEZ, irregular,  or RR = 26-30 []  Decreased FEV1  or RR = 31-35 []  Severe SOB, use  of accessory muscles, or RR ? 35  []    Mental Status Alert, oriented,  Cooperative [x]  Confused but Follows commands []  Lethargic or unable to follow commands []  Obtunded  []  Comatose  []    Breath Sounds Clear to  auscultation  [x]  Decreased unilaterally or  in bases only []  Decreased  bilaterally  []  Crackles or intermittent wheezes []  Wheezes []    Cough Strong, Spontan., & nonproductive [x]  Strong,  spontaneous, &  productive []  Weak,  Nonproductive []  Weak, productive or  with wheezes []  No spontaneous  cough or may require suctioning []    Level of Activity Ambulatory []  Ambulatory w/ Assist  [x]  Non-ambulatory []  Paraplegic []  Quadriplegic []    Total    Score:____3___     Triage Score:_____5___      Tri       Triage:     1. (>20) Freq: Q3    2. (16-20) Freq: Q4   3. (11-15) Freq: QID & Albuterol Q2 PRN    4. (6-10) Freq: TID & Albuterol Q2 PRN    5. (0-5) Freq Q4prn

## 2022-04-09 NOTE — CONSULTS
tricuspid valve regurgitation 2021    Obesity (BMI 30-39. 9)     Recurrent UTI     S/P colonoscopy     [facility], focal active colitis    Schizophrenia, paranoid, chronic ([address]     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 Angelito Black      x1 Dr. Atiya Garcia, Dr Mirian Verdugo 2018    Veria Ask  [facility]10/2021    [facility]    DIAGNOSTIC CARDIAC CATH LAB PROCEDURE  10/02/2019    HYSTERECTOMY, TOTAL ABDOMINAL      one ovary intact, Dr Tino Cruz, menorrhagia    MO TOTAL KNEE ARTHROPLASTY Left 2018    LEFT KNEE TOTAL KNEE ARTHROPLASTY, SHAYNA, NERVE BLOCK performed by Vy Izaguirre MD at [address] [place] [address] [facility]      TOE AMPUTATION Right     TOTAL KNEE ARTHROPLASTY  16    Dr Cheryle Dietrich TUNNELED [address] Right 2020    tunneled HD catheter per Dr Chipper Lesches Medications:    No current facility-administered medications on file prior to encounter.      Current Outpatient Medications on File Prior to Encounter   Medication Sig Dispense Refill    isosorbide mononitrate (IMDUR) 30 MG extended release tablet Take 120 mg by mouth daily      ticagrelor (BRILINTA) 90 MG TABS tablet Take 90 mg by mouth 2 times daily      insulin aspart (NOVOLOG FLEXPEN) 100 UNIT/ML injection pen 15 UNITS PLUS SLIDING SCALE   LESS THAN 180 NONE  181-250 2 UNITS  251-300 4 UNITS  301-400 6 UNITS   401-500 10 UNITS 10 pen 3    melatonin 10 MG CAPS capsule Take 1 capsule by mouth nightly 30 capsule 12    furosemide (LASIX) 20 MG tablet Take 5 tablets by mouth 2 times daily 60 tablet 3    polyethylene glycol (MIRALAX) 17 GM/SCOOP powder Take 1 packet by mouth in the morning for constipation. Hold for diarrhea. 30 each 3    ondansetron (ZOFRAN) 4 MG tablet Take 1 tablet by mouth as needed for nausea/vomiting 3 times a day as needed (Patient not taking: Reported on 4/8/2022) 60 tablet 3    midodrine (PROAMATINE) 5 MG tablet Take 1 tablet by mouth one time a day every Tue, Thu, Sat for hypotension. Give 30 mins prior to dialysis. 90 tablet 3    Handicap Placard MISC by Does not apply route Expiration in 5 years. 1 each 0    sertraline (ZOLOFT) 50 MG tablet TAKE 1 TABLET BY MOUTH DAILY 30 tablet 2    vitamin B-12 (CYANOCOBALAMIN) 100 MCG tablet TAKE 1 TABLET BY MOUTH ONCE DAILY 90 tablet 4    fluticasone (FLONASE) 50 MCG/ACT nasal spray 1 spray by Each Nostril route daily      SITagliptin (JANUVIA) 50 MG tablet Take 50 mg by mouth daily      lidocaine-prilocaine (EMLA) 2.5-2.5 % cream Apply topically as needed for Pain Apply topically as needed. 3 times a week before dialysis wrap with saran wrap      senna (SENOKOT) 8.6 MG tablet Take 1 tablet by mouth nightly      triamcinolone (KENALOG) 0.1 % cream Apply topically daily Apply topically 2 times daily.       albuterol (PROVENTIL) (2.5 MG/3ML) 0.083% nebulizer solution Take 3 mLs by nebulization every 4 hours as needed for Wheezing 120 each 3    hydrOXYzine (ATARAX) 25 MG tablet TAKE ONE TABLET BY MOUTH TWO TIMES A DAY      NYAMYC 492800 UNIT/GM powder APPLY TO AFFECTED AREA OF ABDOMINAL FOLDS EVERY 12 HOURS AS NEEDED      insulin glargine (LANTUS SOLOSTAR) 100 UNIT/ML injection pen 70 UNITS AT BEDTIME (Patient not taking: Reported on 4/8/2022) 30 pen 3    blood glucose test strips (ONETOUCH VERIO) strip  each 3    OneTouch Delica Lancets 40U MISC  each 3    Blood Glucose Monitoring Suppl (ONETOUCH VERIO) w/Device KIT AS DIRECTED 1 kit 0    pantoprazole (PROTONIX) 20 MG tablet TAKE 1 TABLET BY MOUTH DAILY 30 tablet 11    ARIPiprazole (ABILIFY) 5 MG tablet TAKE 1 TABLET BY MOUTH ONCE DAILY 30 tablet 5    Insulin Pen Needle (SURE COMFORT PEN NEEDLES) 30G X 8 MM MISC USE AS DIRECTED FIVE TIMES A DAILY 100 each 10    b complex-C-folic acid (NEPHROCAPS) 1 MG capsule Take 1 capsule by mouth daily      LANTUS SOLOSTAR 100 UNIT/ML injection pen 55 units at bedtime 10 pen 10    metoprolol tartrate (LOPRESSOR) 25 MG tablet TAKE 1/2 TABLET BY MOUTH TWO TIMES DAILY  (Patient taking differently: Take 25 mg by mouth 2 times daily ) 90 tablet 4    insulin aspart (NOVOLOG FLEXPEN) 100 UNIT/ML injection pen INJECT 8-10  units with each meals (Patient not taking: Reported on 4/8/2022) 10 pen 3    aspirin EC 81 MG EC tablet Take 1 tablet by mouth daily 30 tablet 12    magnesium oxide (MAG-OX) 400 MG tablet Take 1 tablet by mouth daily 90 tablet 4    atorvastatin (LIPITOR) 40 MG tablet Take 1 tablet by mouth daily 90 tablet 4    blood glucose test strips (FREESTYLE LITE) strip 1 each by Does not apply route 4 times daily (before meals and nightly) As needed. 200 strip 5    Alcohol Swabs (EASY TOUCH ALCOHOL PREP MEDIUM) 70 % PADS USE AS DIRECTED THREE TIMES A  each 3    FreeStyle Lancets MISC Test 4x daily 150 each 3    docusate sodium (COLACE, DULCOLAX) 100 MG CAPS Take 100 mg by mouth 2 times daily For the prevention and treatment of constipation while taking pain medications.  30 capsule 0    acetaminophen (TYLENOL) 325 MG tablet Take 2 tablets by mouth every 4 hours as needed for Pain (For mild to moderate pain (Pain 1-6 out of 10 on pain scale)) 120 tablet 0    Blood Glucose Monitoring Suppl (FREESTYLE LITE) CORETTA 1 Device by Does not apply route daily as needed (Diabetes) Use freestyle meter to test blood sugar as needed 1 Device 0    nitroGLYCERIN (NITROSTAT) 0.4 MG SL tablet Place 1 tablet under the tongue every 5 minutes as needed for Chest pain         Allergies:  Codeine and Oxycontin [oxycodone hcl]    Social History:    Social History     Socioeconomic History    Marital status:      Spouse name: Not on file    Number of children: 2    Years of education: Not on file    Highest education level: Not on file   Occupational History    Occupation: disabled   Tobacco Use    Smoking status: Never Smoker    Smokeless tobacco: Never Used   Vaping Use    Vaping Use: Never used   Substance and Sexual Activity    Alcohol use: No     Alcohol/week: 0.0 standard drinks    Drug use: No    Sexual activity: Not Currently   Other Topics Concern    Not on file   Social History Narrative    Born in [place], one of 5    Twin sister Reyes, very ill in 2018, [place] 2019    Moved to [facility], , 2 children, one son and one daughter    Worked at [employer], as a nurse's aide    Disabled due to mental illness    Lived at [employer], was discharged, returned to independent living in 2017 in the daughter's house and has adjusted well    One son and one daughter, live in the same house with patient, John Myers pays the rent    [employer])        10/11/2021 [facility] updates; patient lives with her daughter son-in-law and 2 grandchildren and patient's handicapped son. Per daughter, her brother is blind, MRDD, multiple health issues. Daughter's  is patient's legal guardian. Patient has hemodialysis Tuesday Thursday and Saturday. Patient's bedroom is on main floor with a half bath. Daughter walks patient upstairs once weekly for full bath. Patient is using her walker in the home. Patient has a hospital bed in the home.      Social Determinants of Health     Financial Resource Strain: Low Risk     Difficulty of Paying Living Expenses: Not hard at all   Food Insecurity: No Food Insecurity    Worried About Running Out of Food in the Last Year: Never true    Ran Out of Food in the Last Year: Never true   Transportation Needs: No Transportation Needs    Lack of Transportation (Medical): No    Lack of Transportation (Non-Medical): No   Physical Activity:     Days of Exercise per Week: Not on file    Minutes of Exercise per Session: Not on file   Stress:     Feeling of Stress : Not on file   Social Connections:     Frequency of Communication with Friends and Family: Not on file    Frequency of Social Gatherings with Friends and Family: Not on file    Attends Congregation Services: Not on file    Active Member of Clubs or Organizations: Not on file    Attends Club or Organization Meetings: Not on file    Marital Status: Not on file   Intimate Partner Violence:     Fear of Current or Ex-Partner: Not on file    Emotionally Abused: Not on file    Physically Abused: Not on file    Sexually Abused: Not on file   Housing Stability:     Unable to Pay for Housing in the Last Year: Not on file    Number of [place] in the Last Year: Not on file    Unstable Housing in the Last Year: Not on file       Family History:   Family History   Problem Relation Age of Onset    Cancer Mother 76        survived   [facility] Breast Cancer Mother     Hypertension Father     Diabetes Sister     Mental Illness Sister     Colon Cancer Neg Hx        Review of Systems:   Review of Systems   Constitutional: Negative for activity change. HENT: Negative for congestion. Eyes: Negative for discharge. Respiratory: Positive for chest tightness. Cardiovascular: Positive for chest pain. Gastrointestinal: Negative for abdominal distention. Endocrine: Negative for cold intolerance. Genitourinary: Negative for difficulty urinating. Musculoskeletal: Negative for arthralgias. Allergic/Immunologic: Negative for environmental allergies. Neurological: Negative for dizziness. Hematological: Negative for adenopathy. Psychiatric/Behavioral: Negative for agitation. Physical exam:   Constitutional:  VITALS:  BP (!) 101/53   Pulse 77   Temp 97.9 °F (36.6 °C)   Resp 14   Ht 5' 7\" (1.702 m)   Wt 223 lb (101.2 kg)   LMP  (LMP Unknown)   SpO2 93%   BMI 34.93 kg/m²   Gen: alert, awake, nad  Skin: no rash, turgor wnl  Heent:  eomi, mmm  Neck: no bruits or jvd noted, thyroid normal  Lungs:  Normal expansion. Clear to auscultation. No rales, rhonchi, or wheezing. Heart:  Heart sounds are normal.  Regular rate and rhythm without murmur, gallop or rub. Abdomen:  +bs, soft, nt, nd, no hepatosplenomegaly   Extremities: Extremities warm to touch, pink, with no edema. Psychiatric: mood and affect appropriate; judgement and insight intact  Musculoskeletal:  Rom, muscular strength intact; digits, nails normal    Data/  CBC:   Lab Results   Component Value Date    WBC 6.4 04/09/2022    RBC 3.33 04/09/2022    HGB 9.7 04/09/2022    HCT 29.5 04/09/2022    MCV 88.6 04/09/2022    MCH 29.0 04/09/2022    MCHC 32.7 04/09/2022    RDW 18.5 04/09/2022     04/09/2022    MPV 10.0 03/05/2020     BMP:    Lab Results   Component Value Date     04/09/2022    K 3.3 04/09/2022    K 3.9 08/01/2021    CL 94 04/09/2022    CO2 28 04/09/2022    BUN 17 04/09/2022    LABALBU 4.2 04/08/2022    CREATININE 4.95 04/09/2022    CALCIUM 9.3 04/09/2022    GFRAA 10.7 04/09/2022    LABGLOM 8.8 04/09/2022    GLUCOSE 148 04/09/2022    GLUCOSE 419 07/30/2021     IR PTA VENOUS    Result Date: 3/21/2022  1. Successful fistulogram of right arm dialysis AV graft. 2.  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 The area is now patent with proper blood flow. If this area restenosis in the future, we will need to place a stent to keep from restenosis and. CLINICAL DATA: Patient with right arm swelling RADIATION DOSE: 51.21 mGy. PROCEDURES PERFORMED: 1. Ultrasound guidance for vascular access. Ultrasound images saved to PACS. 2. Fluoroscopy-guided fistulogram. 3. IV conscious sedation, sedation time 60 minutes. 4. Angioplasty of stenosis in the right innominate vein at the confluence with the superior vena cava PROCEDURE: Following the discussion of the procedure, alternatives, risks versus benefits, informed consent was obtained from the patient. Specifically, risks of after-biopsy pain at the site, rare possibility of excessive hemorrhage, infection, injury to the adjacent organs were discussed and the patient verbalized understanding. Pre-procedure evaluation confirmed that the patient was an appropriate candidate for conscious sedation. Adequate sedation was maintained during the entire procedure. Vital signs, pulse oximetry, and response to verbal commands were monitored and recorded by the nurse throughout the procedure and the recovery period. The flow sheet was placed in the medical record including the medications and dosages used. The patient returned to baseline neurologic and physiologic status prior to leaving the department. No immediate sedation related complications were noted. Medication for conscious sedation was administered via IV route. 60 minutes of conscious sedation was provided. Following universal protocol, patient and site verification was performed with a \"timeout\" prior to the procedure. Informed and written consent was obtained from the patient following discussion of risks, benefits and alternatives to this procedure. The was patient placed supine on the angiographic table. The patient's right upper extremity brachial artery to brachial vein loop graft site was then prepped and draped in normal sterile fashion. A small amount of local lidocaine anesthesia was injected subcutaneously. Ultrasound was used to study the AV graft prior to accessing it. The vein was patent to the level of the proximal arm.    Using ultrasound access, puncture was made of the graft in an antegrade fashion using a 21 GA needle, ultrasound images saved to PACS. A wire was advanced and a 6 Western Sidra short sheath was placed. Through the sheath contrast was hand injected, and serial digital subtraction angiography of the graft was performed. The anastomosis, and proximal and mid outflow graft were patent. Flow  through the fistula was seen to be sluggish. 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 After IV heparin was administered, and angioplasty balloon was advanced to the area of stenosis in the right innominate vein and angioplasty was performed. Repeat venogram demonstrated a now widely patent vein with proper blood flow. The access site was then sutured with a pursestring suture and sterile dressing was placed. There were no immediate complications. The patient was discharged in normal and stable condition. XR CHEST PORTABLE    Result Date: 4/8/2022  EXAMINATION: XR CHEST PORTABLE CLINICAL HISTORY: CHEST PAIN FOR 2 DAYS. DIALYSIS. COMPARISONS: CHEST CT, JANUARY 12, 2022, CHEST RADIOGRAPH, SAME DATE FINDINGS: Median sternotomy. Cardiopericardial silhouette upper limits of normal. Increased opacity obscures left diaphragm left lower lung and margin left cardiac silhouette. Limited right costophrenic angle. Borderline cardiomegaly. Small right effusion. Left lower lung atelectasis/pneumonia. IR CONTRAST INJECTION  EXISTING CV ACCESS DEVICE EVALUATION W FLUORO    1. Successful fistulogram of right arm dialysis AV graft. 2.  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 The area is now patent with proper blood flow. If this area restenosis in the future, we will need to place a stent to keep from restenosis and.  CLINICAL DATA: Patient with right arm swelling RADIATION DOSE: 51.21 mGy. PROCEDURES PERFORMED: 1. Ultrasound guidance for vascular access. Ultrasound images saved to PACS. 2. Fluoroscopy-guided fistulogram. 3. IV conscious sedation, sedation time 60 minutes. 4. Angioplasty of stenosis in the right innominate vein at the confluence with the superior vena cava PROCEDURE: Following the discussion of the procedure, alternatives, risks versus benefits, informed consent was obtained from the patient. Specifically, risks of after-biopsy pain at the site, rare possibility of excessive hemorrhage, infection, injury to the adjacent organs were discussed and the patient verbalized understanding. Pre-procedure evaluation confirmed that the patient was an appropriate candidate for conscious sedation. Adequate sedation was maintained during the entire procedure. Vital signs, pulse oximetry, and response to verbal commands were monitored and recorded by the nurse throughout the procedure and the recovery period. The flow sheet was placed in the medical record including the medications and dosages used. The patient returned to baseline neurologic and physiologic status prior to leaving the department. No immediate sedation related complications were noted. Medication for conscious sedation was administered via IV route. 60 minutes of conscious sedation was provided. Following universal protocol, patient and site verification was performed with a \"timeout\" prior to the procedure. Informed and written consent was obtained from the patient following discussion of risks, benefits and alternatives to this procedure. The was patient placed supine on the angiographic table. The patient's right upper extremity brachial artery to brachial vein loop graft site was then prepped and draped in normal sterile fashion. A small amount of local lidocaine anesthesia was injected subcutaneously. Ultrasound was used to study the AV graft prior to accessing it.   The vein was patent to the level of the proximal arm. Using ultrasound access, puncture was made of the graft in an antegrade fashion using a 21 GA needle, ultrasound images saved to PACS. A wire was advanced and a 6 Western Sidar short sheath was placed. Through the sheath contrast was hand injected, and serial digital subtraction angiography of the graft was performed. The anastomosis, and proximal and mid outflow graft were patent. Flow  through the fistula was seen to be sluggish. 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 After IV heparin was administered, and angioplasty balloon was advanced to the area of stenosis in the right innominate vein and angioplasty was performed. Repeat venogram demonstrated a now widely patent vein with proper blood flow. The access site was then sutured with a pursestring suture and sterile dressing was placed. There were no immediate complications. The patient was discharged in normal and stable condition. IR FISTULAGRAM    1. Successful fistulogram of right arm dialysis AV graft. 2.  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 The area is now patent with proper blood flow. If this area restenosis in the future, we will need to place a stent to keep from restenosis and. CLINICAL DATA: Patient with right arm swelling RADIATION DOSE: 51.21 mGy. PROCEDURES PERFORMED: 1. Ultrasound guidance for vascular access. Ultrasound images saved to PACS. 2. Fluoroscopy-guided fistulogram. 3. IV conscious sedation, sedation time 60 minutes. 4. Angioplasty of stenosis in the right innominate vein at the confluence with the superior vena cava PROCEDURE: Following the discussion of the procedure, alternatives, risks versus benefits, informed consent was obtained from the patient. Specifically, risks of after-biopsy pain at the site, rare possibility of excessive hemorrhage, infection, injury to the adjacent organs were discussed and the patient verbalized understanding. Pre-procedure evaluation confirmed that the patient was an appropriate candidate for conscious sedation. Adequate sedation was maintained during the entire procedure. Vital signs, pulse oximetry, and response to verbal commands were monitored and recorded by the nurse throughout the procedure and the recovery period. The flow sheet was placed in the medical record including the medications and dosages used. The patient returned to baseline neurologic and physiologic status prior to leaving the department. No immediate sedation related complications were noted. Medication for conscious sedation was administered via IV route. 60 minutes of conscious sedation was provided. Following universal protocol, patient and site verification was performed with a \"timeout\" prior to the procedure. Informed and written consent was obtained from the patient following discussion of risks, benefits and alternatives to this procedure. The was patient placed supine on the angiographic table. The patient's right upper extremity brachial artery to brachial vein loop graft site was then prepped and draped in normal sterile fashion. A small amount of local lidocaine anesthesia was injected subcutaneously. Ultrasound was used to study the AV graft prior to accessing it. The vein was patent to the level of the proximal arm. Using ultrasound access, puncture was made of the graft in an antegrade fashion using a 21 GA needle, ultrasound images saved to PACS. A wire was advanced and a 6 Western Sidra short sheath was placed. Through the sheath contrast was hand injected, and serial digital subtraction angiography of the graft was performed. The anastomosis, and proximal and mid outflow graft were patent.  Flow  through the fistula was seen to be sluggish. 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 After IV heparin was administered, and angioplasty balloon was advanced to the area of stenosis in the right innominate vein and angioplasty was performed. Repeat venogram demonstrated a now widely patent vein with proper blood flow. The access site was then sutured with a pursestring suture and sterile dressing was placed. There were no immediate complications. The patient was discharged in normal and stable condition. IR US HEMODIALYSIS ACCESS EVAL    1. Loop graft in the right upper extremity from the brachial artery loop to the brachial vein is patent. There is back-and-forth flow throughout the entire graft consistent with a more central venous stenosis. No evidence of stenosis in the graft or thrombus. COMPARISON:No prior studies are available for comparison. DIAGNOSIS: Dialysis problem COMMENTS: None TECHNIQUE: Grayscale and color Doppler ultrasound evaluation of right arm AV graft FINDINGS: Loop graft in the right upper extremity is noted. Loop graft extends from the brachial artery to the brachial vein. Artery and vein are seen to be patent. There is no evidence of thrombus or stenosis within the graft proper itself. There is back-and-forth flow throughout the graft indicative of a more central venous stenosis. Assessment/  58 yo lady with ESRD on HD [facility] at [facility] with Dr Dianne Herndon. 3:30 with . Normally on 2k bath but changed to 3k bath here. bp ok to low. Admitted with CP    Plan/  1- HD today as ordered. 3k bath instead of 2  2- ok for d/c at any point from renal standpoint  3. Cont midodrine      Thank you for the consultation. Please do not hesitate to call with questions.     Pastor Sy MD

## 2022-04-11 ENCOUNTER — CARE COORDINATION (OUTPATIENT)
Dept: CARE COORDINATION | Age: 62
End: 2022-04-11

## 2022-04-11 LAB
EKG ATRIAL RATE: 79 BPM
EKG P AXIS: 116 DEGREES
EKG P-R INTERVAL: 236 MS
EKG Q-T INTERVAL: 444 MS
EKG QRS DURATION: 134 MS
EKG QTC CALCULATION (BAZETT): 509 MS
EKG R AXIS: -58 DEGREES
EKG T AXIS: 99 DEGREES
EKG VENTRICULAR RATE: 79 BPM

## 2022-04-11 NOTE — CARE COORDINATION
[facility] Transitions Initial Follow Up Call    Call within 2 business days of discharge: Yes    Patient: Rinku Olson Patient : 1960   MRN: [id number]  Reason for Admission: -22 NSTEMI  Discharge Date: 22 RARS: Readmission Risk Score: 25.5 ( )      Last Discharge [facility]       Complaint Diagnosis Description Type Department Provider    22 Chest Pain NSTEMI (non-ST elevated myocardial infarction) ([facility] [address] . .. ED to Hosp-Admission (Discharged) (ADMITTED) Karyn Anderson MD        Date/Time:  2022 12:19 PM  Attempted to reach patient by telephone for Initial Care Transitions call. Call within 2 business days of discharge: Yes Left HIPPA compliant message requesting a return call. Will attempt to reach patient again. Katelyn Aleman [facility] 68 / [address]  [phone]       Facility: [address] [place] [address] Transitions 24 Hour Call    Do you have all of your prescriptions and are they filled?: Yes  Post Acute Services: Home Health (Comment: [facility])  Patient DME: Casandra Laird chair, Hospital bed  Patient Home Equipment: Nebulizer  Do you have support at home?: Child, Grandchild  Are you an active caregiver in your home?: No  Care Transitions Interventions         Follow Up  Future Appointments   Date Time Provider Aminata Cortes   2022  2:30 PM Noe Harvey MD [facility]   2022  5:30 PM Mackenzie Watson MD [address]   2022  9:30 AM MLOZ ECHO  S. Jenni   2022 12:30 PM Cody Arzola MD [address] [place] [address]   2022 11:30 [facility]   2022 10:30 AM Jayro Matthews MD [address]

## 2022-04-12 ENCOUNTER — CARE COORDINATION (OUTPATIENT)
Dept: CARE COORDINATION | Age: 62
End: 2022-04-12

## 2022-04-12 RX ORDER — ARIPIPRAZOLE 5 MG/1
TABLET ORAL
Qty: 30 TABLET | Refills: 0 | Status: SHIPPED | OUTPATIENT
Start: 2022-04-12 | End: 2022-05-05 | Stop reason: SDUPTHER

## 2022-04-12 RX ORDER — PREGABALIN 75 MG/1
CAPSULE ORAL
Qty: 60 CAPSULE | Refills: 0 | Status: SHIPPED | OUTPATIENT
Start: 2022-04-12 | End: 2022-05-05 | Stop reason: SDUPTHER

## 2022-04-12 NOTE — CARE COORDINATION
[facility] Transitions Initial Follow Up Call    Call within 2 business days of discharge: Yes    Patient: Jens Aldana Patient : 1960   MRN: [id number]  Reason for Admission: -22 NSTEMI  Discharge Date: 22 RARS: Readmission Risk Score: 25.5 ( )      Last Discharge [facility]       Complaint Diagnosis Description Type Department Provider    22 Chest Pain NSTEMI (non-ST elevated myocardial infarction) ([facility] [address] . .. ED to Hosp-Admission (Discharged) (ADMITTED) Falguni Irizarry MD        Second attempt made to reach patient by telephone for Initial Care Transitions call. Call within 2 business days of discharge: Yes Left HIPPA compliant message requesting a return call. CTN will sign off.     Katelyn Aleman [facility] 74 / [address]  [phone]      Facility: [address] [place] [address] Transitions 24 Hour Call    Do you have all of your prescriptions and are they filled?: Yes  Post Acute Services: Home Health (Comment: Santi Smith [facility])  Patient DME: Hessie Salvage chair, Hospital bed  Patient Home Equipment: Nebulizer  Do you have support at home?: Child, Grandchild  Are you an active caregiver in your home?: No  Care Transitions Interventions         Follow Up  Future Appointments   Date Time Provider Aminata Cortes   2022  2:30 PM Becca Rush MD [address] [place] [address]   2022  5:30 PM Tegan Cardenas MD [facility]   2022  9:30 AM MLOZ ECHO  S. Jenni   2022 12:30 PM Tess Orosco MD [facility]   2022 11:30 AM [facility]   2022 10:30 AM Hamida Galeana MD [place] [address] [facility]

## 2022-04-13 ENCOUNTER — TELEPHONE (OUTPATIENT)
Dept: CARDIOLOGY CLINIC | Age: 62
End: 2022-04-13

## 2022-04-13 ENCOUNTER — OFFICE VISIT (OUTPATIENT)
Dept: ENDOCRINOLOGY | Age: 62
End: 2022-04-13
Payer: MEDICARE

## 2022-04-13 VITALS
OXYGEN SATURATION: 90 % | SYSTOLIC BLOOD PRESSURE: 118 MMHG | HEART RATE: 84 BPM | WEIGHT: 223 LBS | DIASTOLIC BLOOD PRESSURE: 74 MMHG | BODY MASS INDEX: 35 KG/M2 | HEIGHT: 67 IN

## 2022-04-13 DIAGNOSIS — R29.6 RECURRENT FALLS: ICD-10-CM

## 2022-04-13 DIAGNOSIS — R26.2 DIFFICULTY IN WALKING: Primary | ICD-10-CM

## 2022-04-13 DIAGNOSIS — E11.65 UNCONTROLLED TYPE 2 DIABETES MELLITUS WITH HYPERGLYCEMIA (HCC): Primary | ICD-10-CM

## 2022-04-13 DIAGNOSIS — E11.40 CONTROLLED TYPE 2 DIABETES MELLITUS WITH DIABETIC NEUROPATHY, WITH LONG-TERM CURRENT USE OF INSULIN (HCC): ICD-10-CM

## 2022-04-13 DIAGNOSIS — G62.81 CRITICAL ILLNESS POLYNEUROPATHY (HCC): ICD-10-CM

## 2022-04-13 DIAGNOSIS — F33.1 MODERATE EPISODE OF RECURRENT MAJOR DEPRESSIVE DISORDER (HCC): ICD-10-CM

## 2022-04-13 DIAGNOSIS — Z79.4 CONTROLLED TYPE 2 DIABETES MELLITUS WITH DIABETIC NEUROPATHY, WITH LONG-TERM CURRENT USE OF INSULIN (HCC): ICD-10-CM

## 2022-04-13 LAB
CHP ED QC CHECK: NORMAL
GLUCOSE BLD-MCNC: 415 MG/DL
HBA1C MFR BLD: 6.7 %

## 2022-04-13 PROCEDURE — 82962 GLUCOSE BLOOD TEST: CPT | Performed by: INTERNAL MEDICINE

## 2022-04-13 PROCEDURE — G8427 DOCREV CUR MEDS BY ELIG CLIN: HCPCS | Performed by: INTERNAL MEDICINE

## 2022-04-13 PROCEDURE — 3017F COLORECTAL CA SCREEN DOC REV: CPT | Performed by: INTERNAL MEDICINE

## 2022-04-13 PROCEDURE — G8417 CALC BMI ABV UP PARAM F/U: HCPCS | Performed by: INTERNAL MEDICINE

## 2022-04-13 PROCEDURE — 1036F TOBACCO NON-USER: CPT | Performed by: INTERNAL MEDICINE

## 2022-04-13 PROCEDURE — 99213 OFFICE O/P EST LOW 20 MIN: CPT | Performed by: INTERNAL MEDICINE

## 2022-04-13 PROCEDURE — 3044F HG A1C LEVEL LT 7.0%: CPT | Performed by: INTERNAL MEDICINE

## 2022-04-13 PROCEDURE — 83036 HEMOGLOBIN GLYCOSYLATED A1C: CPT | Performed by: INTERNAL MEDICINE

## 2022-04-13 PROCEDURE — 2022F DILAT RTA XM EVC RTNOPTHY: CPT | Performed by: INTERNAL MEDICINE

## 2022-04-13 RX ORDER — ARIPIPRAZOLE 5 MG/1
TABLET ORAL
Qty: 30 TABLET | Refills: 0 | OUTPATIENT
Start: 2022-04-13

## 2022-04-13 RX ORDER — PREGABALIN 75 MG/1
CAPSULE ORAL
Qty: 60 CAPSULE | Refills: 0 | OUTPATIENT
Start: 2022-04-13 | End: 2022-05-13

## 2022-04-13 NOTE — PROGRESS NOTES
4/13/2022    Assessment:       Diagnosis Orders   1. Uncontrolled type 2 diabetes mellitus with hyperglycemia (HCC)  POCT Glucose    POCT glycosylated hemoglobin (Hb A1C)         PLAN:     Orders Placed This Encounter   Procedures    Hemoglobin A1C     Standing Status:   Future     Standing Expiration Date:   4/13/2023    Basic Metabolic Panel, Fasting     Standing Status:   Future     Standing Expiration Date:   4/13/2023    POCT Glucose    POCT glycosylated hemoglobin (Hb A1C)     Continue current insulin regimen      Orders Placed This Encounter   Procedures    POCT Glucose    POCT glycosylated hemoglobin (Hb A1C)     No orders of the defined types were placed in this encounter. No follow-ups on file. Subjective:     Chief Complaint   Patient presents with    Diabetes     Vitals:    04/13/22 1426 04/13/22 1428   BP: 118/74    Pulse: 84    SpO2: (!) 89% 90%   Weight: 223 lb (101.2 kg)    Height: 5' 7\" (1.702 m)      Wt Readings from Last 3 Encounters:   04/13/22 223 lb (101.2 kg)   04/08/22 223 lb (101.2 kg)   03/24/22 219 lb (99.3 kg)     BP Readings from Last 3 Encounters:   04/13/22 118/74   04/09/22 (!) 111/54   03/24/22 120/62     Follow-up on type 2 diabetes complications include coronary artery disease history of renal failure on dialysis blood sugars have been labile   Patient on Lantus 70 units at bedtime NovoLog sliding scale  Testing 3-4 times daily hemoglobin A1c was 6.7    Diabetes  She presents for her follow-up diabetic visit. She has type 2 diabetes mellitus. Her disease course has been fluctuating. Associated symptoms include fatigue. Pertinent negatives for diabetes include no polydipsia and no polyuria. Risk factors for coronary artery disease include obesity. Current diabetic treatment includes insulin injections. She is currently taking insulin pre-breakfast, pre-lunch, pre-dinner and at bedtime. Her overall blood glucose range is 130-140 mg/dl.  (Lab Results       Component Value               Date                       LABA1C                   6.7                 04/13/2022            )     Past Medical History:   Diagnosis Date    Angina at rest [facility]) 5/24/2020    Anxiety     CAD S/P percutaneous coronary angioplasty 2015, 2018    stents per dr Renetta Stone    CHF (congestive heart failure) ([address]     CKD (chronic kidney disease) stage 4, GFR 15-29 ml/min ([address] 2/24/2018    CKD stage 4 due to type 2 diabetes mellitus ([address]     Contusion of right chest wall 2/16/2021    COPD (chronic obstructive pulmonary disease) ([address]     Diabetic nephropathy with proteinuria ([address] 2014    DJD (degenerative joint disease) of knee     Dr Caleb Negron GERD (gastroesophageal reflux disease)     Hemiparesis, left ([address] 2013    entered Assisted Living ([facility])    Hemodialysis patient [facility])     Hemodialysis-associated hypotension 10/22/2021    History of heart failure     History of seizures     History of type C viral hepatitis     HTN (hypertension)     Hyperlipidemia     Impaired mobility and activities of daily living     Mediastinal lymphadenopathy 2013    Reyes Dean    Metabolic syndrome     Moderate persistent asthma without complication 7/88/8722    Need for extended care facility 7/7/2021    Neurogenic urinary incontinence 2013    Neuropathy in diabetes [facility])     Nonrheumatic mitral valve regurgitation 7/7/2021    Nonrheumatic tricuspid valve regurgitation 7/7/2021    Obesity (BMI 30-39. 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 Venkatesh Stoner      x1 Dr. Holly Ash, Dr Palma Pro  08/10/2021    [facility]    DIAGNOSTIC CARDIAC CATH LAB PROCEDURE  10/02/2019    HYSTERECTOMY, TOTAL ABDOMINAL      one ovary intact, Dr Williamson Degree, menorrhagia    WI TOTAL KNEE ARTHROPLASTY Left 2018    LEFT KNEE TOTAL KNEE ARTHROPLASTY, SHAYNA, NERVE BLOCK performed by Tani Mejias MD at [address] [place] [address] [facility]      TOE AMPUTATION Right     TOTAL KNEE ARTHROPLASTY  16    Dr Law Record TUNNELED [address] Right 2020    tunneled HD catheter per Dr Citlaly Ho Marital status:      Spouse name: Not on file    Number of children: 2    Years of education: Not on file    Highest education level: Not on file   Occupational History    Occupation: disabled   Tobacco Use    Smoking status: Never Smoker    Smokeless tobacco: Never Used   Vaping Use    Vaping Use: Never used   Substance and Sexual Activity    Alcohol use: No     Alcohol/week: 0.0 standard drinks    Drug use: No    Sexual activity: Not Currently   Other Topics Concern    Not on file   Social History Narrative    Born in [place], one of 5    Twin sister Reyes, very ill in , 2019    Moved to [place], , 2 children, one son and one daughter    Worked at [employer], as a nurse's aide    Disabled due to mental illness    Lived at [employer], was discharged, returned to independent living in 2017 in the daughter's house and has adjusted well    One son and one daughter, live in the same house with survived    Breast Cancer Mother     Hypertension Father     Diabetes Sister     Mental Illness Sister     Colon Cancer Neg Hx      Allergies   Allergen Reactions    Codeine Hives     hives    Oxycontin [Oxycodone Hcl] Hives       Current Outpatient Medications:     pregabalin (LYRICA) 75 MG capsule, TAKE 1 CAPSULE BY MOUTH TWICE DAILY, Disp: 60 capsule, Rfl: 0    ARIPiprazole (ABILIFY) 5 MG tablet, TAKE 1 TABLET BY MOUTH ONCE DAILY, Disp: 30 tablet, Rfl: 0    potassium chloride (KLOR-CON M) 20 MEQ extended release tablet, Take 2 tablets by mouth 2 times daily (with meals), Disp: 60 tablet, Rfl: 3    isosorbide mononitrate (IMDUR) 30 MG extended release tablet, Take 120 mg by mouth daily, Disp: , Rfl:     insulin aspart (NOVOLOG FLEXPEN) 100 UNIT/ML injection pen, 15 UNITS PLUS SLIDING SCALE  LESS THAN 180 NONE 181-250 2 UNITS 251-300 4 UNITS 301-400 6 UNITS  401-500 10 UNITS, Disp: 10 pen, Rfl: 3    melatonin 10 MG CAPS capsule, Take 1 capsule by mouth nightly, Disp: 30 capsule, Rfl: 12    furosemide (LASIX) 20 MG tablet, Take 5 tablets by mouth 2 times daily, Disp: 60 tablet, Rfl: 3    polyethylene glycol (MIRALAX) 17 GM/SCOOP powder, Take 1 packet by mouth in the morning for constipation. Hold for diarrhea., Disp: 30 each, Rfl: 3    ondansetron (ZOFRAN) 4 MG tablet, Take 1 tablet by mouth as needed for nausea/vomiting 3 times a day as needed, Disp: 60 tablet, Rfl: 3    midodrine (PROAMATINE) 5 MG tablet, Take 1 tablet by mouth one time a day every Tue, Thu, Sat for hypotension. Give 30 mins prior to dialysis. , Disp: 90 tablet, Rfl: 3    Handicap Placard MISC, by Does not apply route Expiration in 5 years. , Disp: 1 each, Rfl: 0    sertraline (ZOLOFT) 50 MG tablet, TAKE 1 TABLET BY MOUTH DAILY, Disp: 30 tablet, Rfl: 2    vitamin B-12 (CYANOCOBALAMIN) 100 MCG tablet, TAKE 1 TABLET BY MOUTH ONCE DAILY, Disp: 90 tablet, Rfl: 4    fluticasone (FLONASE) 50 MCG/ACT nasal spray, 1 spray by Each Nostril route daily, Disp: , Rfl:     SITagliptin (JANUVIA) 50 MG tablet, Take 50 mg by mouth daily, Disp: , Rfl:     lidocaine-prilocaine (EMLA) 2.5-2.5 % cream, Apply topically as needed for Pain Apply topically as needed. 3 times a week before dialysis wrap with saran wrap, Disp: , Rfl:     senna (SENOKOT) 8.6 MG tablet, Take 1 tablet by mouth nightly, Disp: , Rfl:     triamcinolone (KENALOG) 0.1 % cream, Apply topically daily Apply topically 2 times daily. , Disp: , Rfl:     albuterol (PROVENTIL) (2.5 MG/3ML) 0.083% nebulizer solution, Take 3 mLs by nebulization every 4 hours as needed for Wheezing, Disp: 120 each, Rfl: 3    hydrOXYzine (ATARAX) 25 MG tablet, TAKE ONE TABLET BY MOUTH TWO TIMES A DAY, Disp: , Rfl:     NYAMYC 576115 UNIT/GM powder, APPLY TO AFFECTED AREA OF ABDOMINAL FOLDS EVERY 12 HOURS AS NEEDED, Disp: , Rfl:     insulin glargine (LANTUS SOLOSTAR) 100 UNIT/ML injection pen, 70 UNITS AT BEDTIME, Disp: 30 pen, Rfl: 3    blood glucose test strips (ONETOUCH VERIO) strip, QID, Disp: 300 each, Rfl: 3    OneTouch Delica Lancets 83H MISC, QID, Disp: 300 each, Rfl: 3    Blood Glucose Monitoring Suppl (ONETOUCH VERIO) w/Device KIT, AS DIRECTED, Disp: 1 kit, Rfl: 0    pantoprazole (PROTONIX) 20 MG tablet, TAKE 1 TABLET BY MOUTH DAILY, Disp: 30 tablet, Rfl: 11    Insulin Pen Needle (SURE COMFORT PEN NEEDLES) 30G X 8 MM MISC, USE AS DIRECTED FIVE TIMES A DAILY, Disp: 100 each, Rfl: 10    b complex-C-folic acid (NEPHROCAPS) 1 MG capsule, Take 1 capsule by mouth daily, Disp: , Rfl:     LANTUS SOLOSTAR 100 UNIT/ML injection pen, 55 units at bedtime, Disp: 10 pen, Rfl: 10    metoprolol tartrate (LOPRESSOR) 25 MG tablet, TAKE 1/2 TABLET BY MOUTH TWO TIMES DAILY  (Patient taking differently: Take 25 mg by mouth 2 times daily ), Disp: 90 tablet, Rfl: 4    insulin aspart (NOVOLOG FLEXPEN) 100 UNIT/ML injection pen, INJECT 8-10  units with each meals, Disp: 10 pen, Rfl: 3    aspirin EC 81 MG EC tablet, Take 1 tablet by mouth daily, Disp: 30 tablet, Rfl: 12    magnesium oxide (MAG-OX) 400 MG tablet, Take 1 tablet by mouth daily, Disp: 90 tablet, Rfl: 4    atorvastatin (LIPITOR) 40 MG tablet, Take 1 tablet by mouth daily, Disp: 90 tablet, Rfl: 4    blood glucose test strips (FREESTYLE LITE) strip, 1 each by Does not apply route 4 times daily (before meals and nightly) As needed. , Disp: 200 strip, Rfl: 5    Alcohol Swabs (EASY TOUCH ALCOHOL PREP MEDIUM) 70 % PADS, USE AS DIRECTED THREE TIMES A DAY, Disp: 100 each, Rfl: 3    FreeStyle Lancets MISC, Test 4x daily, Disp: 150 each, Rfl: 3    docusate sodium (COLACE, DULCOLAX) 100 MG CAPS, Take 100 mg by mouth 2 times daily For the prevention and treatment of constipation while taking pain medications. , Disp: 30 capsule, Rfl: 0    acetaminophen (TYLENOL) 325 MG tablet, Take 2 tablets by mouth every 4 hours as needed for Pain (For mild to moderate pain (Pain 1-6 out of 10 on pain scale)), Disp: 120 tablet, Rfl: 0    Blood Glucose Monitoring Suppl (FREESTYLE LITE) CORETTA, 1 Device by Does not apply route daily as needed (Diabetes) Use freestyle meter to test blood sugar as needed, Disp: 1 Device, Rfl: 0    nitroGLYCERIN (NITROSTAT) 0.4 MG SL tablet, Place 1 tablet under the tongue every 5 minutes as needed for Chest pain, Disp: , Rfl:   Lab Results   Component Value Date     04/09/2022    K 3.3 (L) 04/09/2022    CL 94 (L) 04/09/2022    CO2 28 04/09/2022    BUN 17 04/09/2022    CREATININE 4.95 (H) 04/09/2022    GLUCOSE 415 04/13/2022    CALCIUM 9.3 04/09/2022    PROT 6.9 04/08/2022    LABALBU 4.2 04/08/2022    BILITOT 0.4 04/08/2022    ALKPHOS 95 04/08/2022    AST 32 04/08/2022    ALT 33 04/08/2022    LABGLOM 8.8 (L) 04/09/2022    GFRAA 10.7 (L) 04/09/2022    GLOB 2.7 04/08/2022     Lab Results   Component Value Date    WBC 6.4 04/09/2022    HGB 9.7 (L) 04/09/2022    HCT 29.5 (L) 04/09/2022    MCV 88.6 04/09/2022     (L) 04/09/2022     Lab Results Component Value Date    LABA1C 6.7 04/13/2022    LABA1C 6.8 12/13/2021    LABA1C 6.2 (H) 08/18/2021     Lab Results   Component Value Date    HDL 33 (L) 04/09/2022    HDL 39 (L) 01/13/2022    HDL 48 06/11/2020    LDLCALC 19 04/09/2022    LDLCALC 38 01/13/2022    LDLCALC 37 06/11/2020    CHOL 70 04/09/2022    CHOL 123 01/13/2022    CHOL 101 06/11/2020    TRIG 92 04/09/2022    TRIG 228 (H) 01/13/2022    TRIG 82 06/11/2020     No results found for: TESTM  Lab Results   Component Value Date    TSH 0.856 07/16/2021    TSH 0.454 06/28/2020    TSH 0.574 10/08/2017    TSHREFLEX 1.300 08/27/2019     No results found for: TPOABS    Review of Systems   Constitutional: Positive for fatigue. Eyes: Negative. Endocrine: Negative for polydipsia and polyuria. All other systems reviewed and are negative. Objective:   Physical Exam  Vitals reviewed. Constitutional:       Appearance: Normal appearance. She is obese. HENT:      Head: Normocephalic and atraumatic. Right Ear: External ear normal.      Left Ear: External ear normal.      Nose: Nose normal.   Eyes:      General: No scleral icterus. Right eye: No discharge. Left eye: No discharge. Extraocular Movements: Extraocular movements intact. Conjunctiva/sclera: Conjunctivae normal.   Cardiovascular:      Rate and Rhythm: Normal rate. Pulmonary:      Effort: Pulmonary effort is normal.   Musculoskeletal:         General: Normal range of motion. Cervical back: Normal range of motion and neck supple. Neurological:      General: No focal deficit present. Mental Status: She is alert and oriented to person, place, and time.    Psychiatric:         Mood and Affect: Mood normal.         Behavior: Behavior normal.

## 2022-04-13 NOTE — TELEPHONE ENCOUNTER
[facility] called requesting an order for Physical therapy. . RN verbalized your approval but they would like a written scriipt/ order.       Fax [phone]  Call [phone]

## 2022-04-13 NOTE — TELEPHONE ENCOUNTER
Rx request   Requested Prescriptions     Pending Prescriptions Disp Refills    ARIPiprazole (ABILIFY) 5 MG tablet 30 tablet 0    pregabalin (LYRICA) 75 MG capsule 60 capsule 0     LOV 10/22/2021  Next Visit Date:  Future Appointments   Date Time Provider Aminata Cortes   4/18/2022  5:30 PM Deyanira Garrett MD [facility]   4/20/2022  9:30 AM MLOZ ECHO  S. Jenni   4/25/2022 12:30 PM Brandyn Sena MD [facility]   5/9/2022 11:30 AM Billy Alicia, APRN - CNP [address]   5/13/2022 10:30 AM Jesus Manuel Lu MD [facility]   7/13/2022  2:30 PM Josué Davis  [address] [facility]

## 2022-04-20 ENCOUNTER — APPOINTMENT (OUTPATIENT)
Dept: GENERAL RADIOLOGY | Age: 62
End: 2022-04-20
Payer: MEDICARE

## 2022-04-20 ENCOUNTER — APPOINTMENT (OUTPATIENT)
Dept: CT IMAGING | Age: 62
End: 2022-04-20
Payer: MEDICARE

## 2022-04-20 ENCOUNTER — HOSPITAL ENCOUNTER (EMERGENCY)
Age: 62
Discharge: HOME | End: 2022-04-20
Payer: MEDICARE

## 2022-04-20 ENCOUNTER — HOSPITAL ENCOUNTER (OUTPATIENT)
Dept: NON INVASIVE DIAGNOSTICS | Age: 62
Discharge: HOME | End: 2022-04-20
Payer: MEDICARE

## 2022-04-20 VITALS
OXYGEN SATURATION: 100 % | TEMPERATURE: 98.3 F | HEIGHT: 67 IN | SYSTOLIC BLOOD PRESSURE: 136 MMHG | DIASTOLIC BLOOD PRESSURE: 74 MMHG | BODY MASS INDEX: 35 KG/M2 | WEIGHT: 223 LBS | HEART RATE: 73 BPM | RESPIRATION RATE: 19 BRPM

## 2022-04-20 DIAGNOSIS — N39.0 ACUTE UTI: ICD-10-CM

## 2022-04-20 DIAGNOSIS — R19.7 DIARRHEA, UNSPECIFIED TYPE: Primary | ICD-10-CM

## 2022-04-20 DIAGNOSIS — I31.39 PERICARDIAL EFFUSION: ICD-10-CM

## 2022-04-20 LAB
ADENOVIRUS F 40 41 PCR: NOT DETECTED
ALBUMIN SERPL-MCNC: 4 G/DL (ref 3.5–4.6)
ALP BLD-CCNC: 84 U/L (ref 40–130)
ALT SERPL-CCNC: 22 U/L (ref 0–33)
ANION GAP SERPL CALCULATED.3IONS-SCNC: 12 MEQ/L (ref 9–15)
AST SERPL-CCNC: 26 U/L (ref 0–35)
ASTROVIRUS PCR: NOT DETECTED
BACTERIA: ABNORMAL /HPF
BASOPHILS ABSOLUTE: 0.1 K/UL (ref 0–0.2)
BASOPHILS RELATIVE PERCENT: 0.8 %
BILIRUB SERPL-MCNC: 0.6 MG/DL (ref 0.2–0.7)
BILIRUBIN URINE: NEGATIVE
BLOOD, URINE: ABNORMAL
BUN BLDV-MCNC: 11 MG/DL (ref 8–23)
CALCIUM SERPL-MCNC: 9.7 MG/DL (ref 8.5–9.9)
CAMPYLOBACTER PCR: NOT DETECTED
CHLORIDE BLD-SCNC: 97 MEQ/L (ref 95–107)
CLARITY: ABNORMAL
CO2: 28 MEQ/L (ref 20–31)
COLOR: YELLOW
CREAT SERPL-MCNC: 3.64 MG/DL (ref 0.5–0.9)
CRYPTOSPORIDIUM PCR: NOT DETECTED
CYCLOSPORA CAYETANENSIS PCR: NOT DETECTED
E COLI ENTEROAGGREGATIVE PCR: NOT DETECTED
E COLI ENTEROPATHOGENIC PCR: NOT DETECTED
E COLI ENTEROTOXIGENIC PCR: NOT DETECTED
E COLI SHIGELLA/ENTEROINVASIVE PCR: NOT DETECTED
ENTAMOEBA HISTOLYTICA PCR: NOT DETECTED
EOSINOPHILS ABSOLUTE: 0.3 K/UL (ref 0–0.7)
EOSINOPHILS RELATIVE PERCENT: 3.3 %
EPITHELIAL CELLS, UA: ABNORMAL /HPF (ref 0–5)
GFR AFRICAN AMERICAN: 15.2
GFR NON-AFRICAN AMERICAN: 12.6
GIARDIA LAMBLIA PCR: NOT DETECTED
GLOBULIN: 2.8 G/DL (ref 2.3–3.5)
GLUCOSE BLD-MCNC: 111 MG/DL (ref 70–99)
GLUCOSE URINE: NEGATIVE MG/DL
HCT VFR BLD CALC: 31.4 % (ref 37–47)
HEMOGLOBIN: 10.6 G/DL (ref 12–16)
HYALINE CASTS: ABNORMAL /HPF (ref 0–5)
KETONES, URINE: ABNORMAL MG/DL
LEUKOCYTE ESTERASE, URINE: ABNORMAL
LIPASE: 28 U/L (ref 12–95)
LV EF: 60 %
LVEF MODALITY: NORMAL
LYMPHOCYTES ABSOLUTE: 1.1 K/UL (ref 1–4.8)
LYMPHOCYTES RELATIVE PERCENT: 12.3 %
MCH RBC QN AUTO: 29.4 PG (ref 27–31.3)
MCHC RBC AUTO-ENTMCNC: 33.8 % (ref 33–37)
MCV RBC AUTO: 87.1 FL (ref 82–100)
MONOCYTES ABSOLUTE: 0.6 K/UL (ref 0.2–0.8)
MONOCYTES RELATIVE PERCENT: 6.5 %
NEUTROPHILS ABSOLUTE: 6.6 K/UL (ref 1.4–6.5)
NEUTROPHILS RELATIVE PERCENT: 77.1 %
NITRITE, URINE: NEGATIVE
NOROVIRUS GI GII PCR: NOT DETECTED
PDW BLD-RTO: 18.8 % (ref 11.5–14.5)
PH UA: 6.5 (ref 5–9)
PLATELET # BLD: 141 K/UL (ref 130–400)
PLESIOMONAS SHIGELLOIDES PCR: NOT DETECTED
POTASSIUM SERPL-SCNC: 4.1 MEQ/L (ref 3.4–4.9)
PROTEIN UA: 100 MG/DL
RBC # BLD: 3.61 M/UL (ref 4.2–5.4)
RBC UA: ABNORMAL /HPF (ref 0–5)
ROTAVIRUS A PCR: NOT DETECTED
SALMONELLA PCR: NOT DETECTED
SAPOVIRUS PCR: NOT DETECTED
SHIGA-LIKE TOXIN-PRODUCING E. COLI (STEC) STX1/STX2: NOT DETECTED
SODIUM BLD-SCNC: 137 MEQ/L (ref 135–144)
SPECIFIC GRAVITY UA: 1.01 (ref 1–1.03)
TOTAL PROTEIN: 6.8 G/DL (ref 6.3–8)
URINE REFLEX TO CULTURE: YES
UROBILINOGEN, URINE: 1 E.U./DL
VIBRIO CHOLERAE PCR: NOT DETECTED
VIBRIO PCR: NOT DETECTED
WBC # BLD: 8.6 K/UL (ref 4.8–10.8)
WBC UA: >100 /HPF (ref 0–5)
YERSINIA ENTEROCOLITICA PCR: NOT DETECTED

## 2022-04-20 PROCEDURE — 85025 COMPLETE CBC W/AUTO DIFF WBC: CPT

## 2022-04-20 PROCEDURE — 80053 COMPREHEN METABOLIC PANEL: CPT

## 2022-04-20 PROCEDURE — 87507 IADNA-DNA/RNA PROBE TQ 12-25: CPT

## 2022-04-20 PROCEDURE — 6360000002 HC RX W HCPCS: Performed by: NURSE PRACTITIONER

## 2022-04-20 PROCEDURE — 74176 CT ABD & PELVIS W/O CONTRAST: CPT

## 2022-04-20 PROCEDURE — 36415 COLL VENOUS BLD VENIPUNCTURE: CPT

## 2022-04-20 PROCEDURE — 71046 X-RAY EXAM CHEST 2 VIEWS: CPT

## 2022-04-20 PROCEDURE — 87449 NOS EACH ORGANISM AG IA: CPT

## 2022-04-20 PROCEDURE — 83690 ASSAY OF LIPASE: CPT

## 2022-04-20 PROCEDURE — 81001 URINALYSIS AUTO W/SCOPE: CPT

## 2022-04-20 PROCEDURE — 87186 SC STD MICRODIL/AGAR DIL: CPT

## 2022-04-20 PROCEDURE — 87086 URINE CULTURE/COLONY COUNT: CPT

## 2022-04-20 PROCEDURE — 87324 CLOSTRIDIUM AG IA: CPT

## 2022-04-20 PROCEDURE — 96374 THER/PROPH/DIAG INJ IV PUSH: CPT

## 2022-04-20 PROCEDURE — 87077 CULTURE AEROBIC IDENTIFY: CPT

## 2022-04-20 PROCEDURE — 99284 EMERGENCY DEPT VISIT MOD MDM: CPT

## 2022-04-20 PROCEDURE — 93306 TTE W/DOPPLER COMPLETE: CPT

## 2022-04-20 RX ORDER — SULFAMETHOXAZOLE AND TRIMETHOPRIM 800; 160 MG/1; MG/1
1 TABLET ORAL 2 TIMES DAILY
Qty: 14 TABLET | Refills: 0 | Status: SHIPPED | OUTPATIENT
Start: 2022-04-20 | End: 2022-04-27

## 2022-04-20 RX ORDER — NITROFURANTOIN 25; 75 MG/1; MG/1
100 CAPSULE ORAL 2 TIMES DAILY
Qty: 14 CAPSULE | Refills: 0 | Status: SHIPPED | OUTPATIENT
Start: 2022-04-20 | End: 2022-04-20

## 2022-04-20 RX ORDER — ONDANSETRON 2 MG/ML
4 INJECTION INTRAMUSCULAR; INTRAVENOUS ONCE
Status: COMPLETED | OUTPATIENT
Start: 2022-04-20 | End: 2022-04-20

## 2022-04-20 RX ADMIN — ONDANSETRON 4 MG: 2 INJECTION INTRAMUSCULAR; INTRAVENOUS at 11:44

## 2022-04-20 ASSESSMENT — ENCOUNTER SYMPTOMS
WHEEZING: 0
DIARRHEA: 1
ABDOMINAL PAIN: 0
SORE THROAT: 0
SHORTNESS OF BREATH: 0
NAUSEA: 1
COUGH: 0
VOMITING: 0
TROUBLE SWALLOWING: 0
BACK PAIN: 0

## 2022-04-20 NOTE — ED NOTES
Pt up to restroom via yamilex chowdhury. Well. 0 problems, 0 c/o, a&ox4, 0 distress, denies pain.      Dia Ennis RN  04/20/22 [id number]

## 2022-04-20 NOTE — ED PROVIDER NOTES
[facility] ED  eMERGENCY dEPARTMENT eNCOUnter      Pt Name: Azam Gee  MRN: [id number]  Addygfnidhi 1960  Date of evaluation: 4/20/2022  Provider: LORETO Pan CNP      HISTORY OF PRESENT ILLNESS    Azam Gee is a 57 y.o. female who presents to the Emergency Department with generalized abdominal pain with diarrhea x 3 days. Patient states she is able to keep food and drink down but does have some nausea. Pain is moderate. She denies fever, chills or recent illness. [employer] REVIEW OF SYSTEMS       Review of Systems   Constitutional: Negative for activity change, appetite change and fever. HENT: Negative for congestion, drooling, ear pain, sore throat and trouble swallowing. Respiratory: Negative for cough, shortness of breath and wheezing. Cardiovascular: Negative for chest pain. Gastrointestinal: Positive for diarrhea and nausea. Negative for abdominal pain and vomiting. Genitourinary: Negative for dysuria. Musculoskeletal: Negative for arthralgias and back pain. Skin: Negative for rash. Neurological: Negative for dizziness, syncope, light-headedness and headaches. All other systems reviewed and are negative.         PAST MEDICAL HISTORY     Past Medical History:   Diagnosis Date    Angina at rest [facility]) 5/24/2020    Anxiety     CAD S/P percutaneous coronary angioplasty 2015, 2018    stents per dr Lalitha Ring    CHF (congestive heart failure) ([address]     CKD (chronic kidney disease) stage 4, GFR 15-29 ml/min ([address] 2/24/2018    CKD stage 4 due to type 2 diabetes mellitus ([address]     Contusion of right chest wall 2/16/2021    COPD (chronic obstructive pulmonary disease) ([address]     Diabetic nephropathy with proteinuria ([address] 2014    DJD (degenerative joint disease) of knee     Dr Zuri Savage GERD (gastroesophageal reflux disease)     Hemiparesis, left ([address] 2013    entered Assisted Living ([facility])    Hemodialysis patient [facility])    [facility] Hemodialysis-associated hypotension 10/22/2021    History of heart failure     History of seizures     History of type C viral hepatitis     HTN (hypertension)     Hyperlipidemia     Impaired mobility and activities of daily living     Mediastinal lymphadenopathy     Lavelle Rodriguez    Metabolic syndrome     Moderate persistent asthma without complication     Need for extended care facility 2021    Neurogenic urinary incontinence 2013    Neuropathy in diabetes [facility])     Nonrheumatic mitral valve regurgitation 2021    Nonrheumatic tricuspid valve regurgitation 2021    Obesity (BMI 30-39. 9)     Recurrent UTI     S/P colonoscopy     [facility], focal active colitis    Schizophrenia, paranoid, chronic ([address]     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 Dee Medina      x1 Dr. Megah Ambrocio, Dr Yazmin Willams 2018   [address]  08/10/2021    [facility]    DIAGNOSTIC CARDIAC CATH LAB PROCEDURE  10/02/2019    HYSTERECTOMY, TOTAL ABDOMINAL      one ovary intact, Dr Daryl Ragland, menorrhagia    DC TOTAL KNEE ARTHROPLASTY Left 2018    LEFT KNEE TOTAL KNEE ARTHROPLASTY, SHAYNA, NERVE BLOCK performed by Chuck Birmingham MD at [address] [place] [address] [facility]      TOE AMPUTATION Right     TOTAL KNEE ARTHROPLASTY  16     [place]    TUNNELED VENOUS CATHETER PLACEMENT Right 07/01/2020    tunneled HD catheter per Dr Emilie Shin       Discharge Medication List as of 4/20/2022  1:30 PM      CONTINUE these medications which have NOT CHANGED    Details   pregabalin (LYRICA) 75 MG capsule TAKE 1 CAPSULE BY MOUTH TWICE DAILY, Disp-60 capsule, R-0Normal      ARIPiprazole (ABILIFY) 5 MG tablet TAKE 1 TABLET BY MOUTH ONCE DAILY, Disp-30 tablet, R-0Normal      potassium chloride (KLOR-CON M) 20 MEQ extended release tablet Take 2 tablets by mouth 2 times daily (with meals), Disp-60 tablet, R-3Normal      isosorbide mononitrate (IMDUR) 30 MG extended release tablet Take 120 mg by mouth dailyHistorical Med      !! insulin aspart (NOVOLOG FLEXPEN) 100 UNIT/ML injection pen 15 UNITS PLUS SLIDING SCALE   LESS THAN 180 NONE  181-250 2 UNITS  251-300 4 UNITS  301-400 6 UNITS   401-500 10 UNITS, Disp-10 pen, R-3Normal      melatonin 10 MG CAPS capsule Take 1 capsule by mouth nightly, Disp-30 capsule, R-12Normal      furosemide (LASIX) 20 MG tablet Take 5 tablets by mouth 2 times daily, Disp-60 tablet, R-3Normal      polyethylene glycol (MIRALAX) 17 GM/SCOOP powder Take 1 packet by mouth in the morning for constipation. Hold for diarrhea., Disp-30 each, R-3Normal      ondansetron (ZOFRAN) 4 MG tablet Take 1 tablet by mouth as needed for nausea/vomiting 3 times a day as needed, Disp-60 tablet, R-3Normal      midodrine (PROAMATINE) 5 MG tablet Take 1 tablet by mouth one time a day every Tue, Thu, Sat for hypotension. Give 30 mins prior to dialysis. , Disp-90 tablet, R-3Normal      Handicap Placard MIS Starting Wed 3/16/2022, Disp-1 each, R-0, PrintExpiration in 5 years.       sertraline (ZOLOFT) 50 MG tablet TAKE 1 TABLET BY MOUTH DAILY, Disp-30 tablet, R-2Normal      vitamin B-12 (CYANOCOBALAMIN) 100 MCG tablet TAKE 1 TABLET BY MOUTH ONCE DAILY, Disp-90 tablet, R-4Normal      fluticasone (FLONASE) 50 MCG/ACT nasal spray 1 spray by Each Nostril route dailyHistorical Med      SITagliptin (JANUVIA) 50 MG tablet Take 50 mg by mouth dailyHistorical Med      lidocaine-prilocaine (EMLA) 2.5-2.5 % cream Apply topically as needed for Pain Apply topically as needed. 3 times a week before dialysis wrap with saran wrap, Topical, PRN, Historical Med      senna (SENOKOT) 8.6 MG tablet Take 1 tablet by mouth nightlyHistorical Med      triamcinolone (KENALOG) 0.1 % cream Apply topically daily Apply topically 2 times daily. , Topical, DAILY, Historical Med      albuterol (PROVENTIL) (2.5 MG/3ML) 0.083% nebulizer solution Take 3 mLs by nebulization every 4 hours as needed for Wheezing, Disp-120 each, R-3Print      hydrOXYzine (ATARAX) 25 MG tablet TAKE ONE TABLET BY MOUTH TWO TIMES A DAYHistorical Med      NYAMYC 906572 UNIT/GM powder APPLY TO AFFECTED AREA OF ABDOMINAL FOLDS EVERY 12 HOURS AS NEEDED, DAWHistorical Med      !! insulin glargine (LANTUS SOLOSTAR) 100 UNIT/ML injection pen 70 UNITS AT BEDTIME, Disp-30 pen, R-3Normal      !! blood glucose test strips (ONETOUCH VERIO) strip QID, Disp-300 each, R-3Normal      !!  OneTouch Delica Lancets 80G MISC QID, Disp-300 each, R-3Normal      Blood Glucose Monitoring Suppl (ONETOUCH VERIO) w/Device KIT AS DIRECTED, Disp-1 kit, R-0Normal      pantoprazole (PROTONIX) 20 MG tablet TAKE 1 TABLET BY MOUTH DAILY, Disp-30 tablet, R-11Normal      Insulin Pen Needle (SURE COMFORT PEN NEEDLES) 30G X 8 MM MISC USE AS DIRECTED FIVE TIMES A DAILY, Disp-100 each, R-10Normal      b complex-C-folic acid (NEPHROCAPS) 1 MG capsule Take 1 capsule by mouth dailyHistorical Med      !! LANTUS SOLOSTAR 100 UNIT/ML injection pen 55 units at bedtime, Disp-10 pen, R-10, DAWNormal      metoprolol tartrate (LOPRESSOR) 25 MG tablet TAKE 1/2 TABLET BY MOUTH TWO TIMES DAILY , Disp-90 tablet, R-4Normal      !! insulin aspart (NOVOLOG FLEXPEN) 100 UNIT/ML injection pen INJECT 8-10  units with each meals, Disp-10 pen, R-3Normal      aspirin EC 81 MG EC tablet Take 1 tablet by mouth daily, Disp-30 tablet, R-12Normal      magnesium oxide (MAG-OX) 400 MG tablet Take 1 tablet by mouth daily, Disp-90 tablet, R-4Normal      atorvastatin (LIPITOR) 40 MG tablet Take 1 tablet by mouth daily, Disp-90 tablet, R-4Normal      !! blood glucose test strips (FREESTYLE LITE) strip 1 each by Does not apply route 4 times daily (before meals and nightly) As needed. , Disp-200 strip, R-5**Patient requests 90 days supply**Normal      Alcohol Swabs (EASY TOUCH ALCOHOL PREP MEDIUM) 70 % PADS Disp-100 each, R-3, NormalUSE AS DIRECTED THREE TIMES A DAY      !! FreeStyle Lancets MISC Disp-150 each, R-3, NormalTest 4x daily      docusate sodium (COLACE, DULCOLAX) 100 MG CAPS Take 100 mg by mouth 2 times daily For the prevention and treatment of constipation while taking pain medications. , Disp-30 capsule, R-0Print      acetaminophen (TYLENOL) 325 MG tablet Take 2 tablets by mouth every 4 hours as needed for Pain (For mild to moderate pain (Pain 1-6 out of 10 on pain scale)), Disp-120 tablet, R-0Print      Blood Glucose Monitoring Suppl (FREESTYLE LITE) CORETTA DAILY PRN Starting Tue 12/29/2020, Disp-1 Device, R-0, NormalUse freestyle meter to test blood sugar as needed       nitroGLYCERIN (NITROSTAT) 0.4 MG SL tablet Place 1 tablet under the tongue every 5 minutes as needed for Chest painHistorical Med       !! - Potential duplicate medications found. Please discuss with provider.           ALLERGIES     Codeine and Oxycontin [oxycodone hcl]    FAMILY HISTORY       Family History   Problem Relation Age of Onset   Unk Fujisawa Cancer Mother 76        survived   Unk Milviawa Breast Cancer Mother     Hypertension Father     Diabetes Sister     Mental Illness Sister     Colon Cancer Neg Hx           SOCIAL HISTORY       Social History     Socioeconomic History    Marital status:      Spouse name: None    Number of children: 2    Years of education: None    Highest education level: None   Occupational History    Occupation: disabled   Tobacco Use    Smoking status: Never Smoker    Smokeless tobacco: Never Used   Vaping Use    Vaping Use: Never used   Substance and Sexual Activity    Alcohol use: No     Alcohol/week: 0.0 standard drinks    Drug use: No    Sexual activity: Not Currently   Other Topics Concern    None   Social History Narrative    Born in [place], one of 5    Twin sister Reyes, very ill in 2018, [place] [address]    Moved to [facility], , 2 children, one son and one daughter    Worked at [employer], as a nurse's aide    Disabled due to mental illness    Lived at [facility], was discharged, returned to independent living in 2017 in the daughter's house and has adjusted well    One son and one daughter, live in the same house with patient, Eduardo Schofield pays the rent    [employer])        10/11/2021 [facility] updates; patient lives with her daughter son-in-law and 2 grandchildren and patient's handicapped son. Per daughter, her brother is blind, MRDD, multiple health issues. Daughter's  is patient's legal guardian. Patient has hemodialysis Tuesday Thursday and Saturday. Patient's bedroom is on main floor with a half bath. Daughter walks patient upstairs once weekly for full bath. Patient is using her walker in the home. Patient has a hospital bed in the home. Social Determinants of Health     Financial Resource Strain: Low Risk     Difficulty of Paying Living Expenses: Not hard at all   Food Insecurity: No Food Insecurity    Worried About Running Out of Food in the Last Year: Never true    Yung of Food in the Last Year: Never true   Transportation Needs: No Transportation Needs    Lack of Transportation (Medical): No    Lack of Transportation (Non-Medical):  No   Physical Activity:     Days of Exercise per Week: Not on file    Minutes of Exercise per Session: Not on file   Stress:     Feeling of Stress : Not on file   Social Connections:  Frequency of Communication with Friends and Family: Not on file    Frequency of Social Gatherings with Friends and Family: Not on file    Attends Congregational Services: Not on file    Active Member of Clubs or Organizations: Not on file    Attends Club or Organization Meetings: Not on file    Marital Status: Not on file   Intimate Partner Violence:     Fear of Current or Ex-Partner: Not on file    Emotionally Abused: Not on file    Physically Abused: Not on file    Sexually Abused: Not on file   Housing Stability:     Unable to Pay for Housing in the Last Year: Not on file    Number of [place] in the Last Year: Not on file    Unstable Housing in the Last Year: Not on file       SCREENINGS    Michele Coma Scale  Eye Opening: Spontaneous  Best Verbal Response: Oriented  Best Motor Response: Obeys commands  Michele Coma Scale Score: 15 @FLOW([id number])@      PHYSICAL EXAM    (up to 7 for level 4, 8 or more for level 5)     ED Triage Vitals [04/20/22 1016]   BP Temp Temp Source Pulse Resp SpO2 Height Weight   112/67 98.3 °F (36.8 °C) Temporal 80 18 94 % 5' 7\" (1.702 m) 223 lb (101.2 kg)       Physical Exam  Vitals and nursing note reviewed. Constitutional:       Appearance: She is well-developed. HENT:      Head: Normocephalic and atraumatic. Right Ear: Hearing and external ear normal.      Left Ear: Hearing and external ear normal.      Nose: Nose normal.      Mouth/Throat:      Lips: Pink. Mouth: Mucous membranes are moist.      Pharynx: Oropharynx is clear. Uvula midline. Eyes:      Conjunctiva/sclera: Conjunctivae normal.      Pupils: Pupils are equal, round, and reactive to light. Cardiovascular:      Rate and Rhythm: Normal rate and regular rhythm. Heart sounds: Normal heart sounds. Pulmonary:      Effort: Pulmonary effort is normal. No accessory muscle usage or respiratory distress. Breath sounds: Normal breath sounds. No decreased air movement.  No decreased breath sounds, wheezing or rhonchi. Abdominal:      General: Bowel sounds are normal. There is no distension. Palpations: Abdomen is soft. Tenderness: There is generalized abdominal tenderness. There is no right CVA tenderness, left CVA tenderness, guarding or rebound. Musculoskeletal:         General: Normal range of motion. Cervical back: Normal range of motion and neck supple. Skin:     General: Skin is warm and dry. Neurological:      General: No focal deficit present. Mental Status: She is alert and oriented to person, place, and time. GCS: GCS eye subscore is 4. GCS verbal subscore is 5. GCS motor subscore is 6. Deep Tendon Reflexes: Reflexes are normal and symmetric. Psychiatric:         Judgment: Judgment normal.           All other labs were within normal range or not returned as of this dictation. EMERGENCY DEPARTMENT COURSE and DIFFERENTIALDIAGNOSIS/MDM:   Vitals:    Vitals:    04/20/22 1148 04/20/22 1232 04/20/22 1300 04/20/22 1330   BP: 136/74 131/63 134/60 136/74   Pulse: 77 75 75 73   Resp: 17 16 17 19   Temp:       TempSrc:       SpO2: 100% 100% 97% 100%   Weight:       Height:                57 yr old female with diarrhea and UTI. Patient labs and CT are unremarkable. Prescription for Macrobid was given to the patient. She is to increase Fiber in the diet as discussed. Patient to F/U with PCP in 2 days or return if worse. Patient verbalizes understanding. PROCEDURES:  Unless otherwise noted below, none     Procedures      FINAL IMPRESSION      1. Diarrhea, unspecified type    2.  Acute UTI          DISPOSITION/PLAN   DISPOSITION Decision To Discharge 04/20/2022 01:30:39 PM          LORETO Leyva CNP (electronically signed)  Attending Emergency Physician     LORETO Leyva CNP  04/20/22 0464 [facility] CNP  04/20/22 [id number] [facility], LORETO Mcfadden CNP  04/20/22 0607

## 2022-04-20 NOTE — ED TRIAGE NOTES
Patient c/o diarrhea, started a few days ago. C/o nausea, abdominal pain 6/10. Respirations equal and unlabored.

## 2022-04-20 NOTE — ED NOTES
Pt resting in bed, watching tv, a&ox4, skin w/d/pink, 0 distress, denies pain, 0 n&v, will monitor.      Deanna Chapman RN  04/20/22 7101

## 2022-04-20 NOTE — ED NOTES
Pt yamilex. Well straight cath, urine to lab, pt resting in bed, 0 c/o, 0 distress, 0 pain, will monitor.      Navin Tee RN  04/20/22 6909

## 2022-04-21 ENCOUNTER — CARE COORDINATION (OUTPATIENT)
Dept: CARE COORDINATION | Age: 62
End: 2022-04-21

## 2022-04-21 LAB — C DIFF TOXIN/ANTIGEN: NORMAL

## 2022-04-22 ENCOUNTER — OFFICE VISIT (OUTPATIENT)
Dept: GASTROENTEROLOGY | Age: 62
End: 2022-04-22
Payer: MEDICARE

## 2022-04-22 VITALS — WEIGHT: 214 LBS | HEIGHT: 67 IN | HEART RATE: 62 BPM | OXYGEN SATURATION: 98 % | BODY MASS INDEX: 33.59 KG/M2

## 2022-04-22 DIAGNOSIS — R11.0 NAUSEA: ICD-10-CM

## 2022-04-22 DIAGNOSIS — R19.7 DIARRHEA, UNSPECIFIED TYPE: Primary | ICD-10-CM

## 2022-04-22 DIAGNOSIS — N18.4 CKD (CHRONIC KIDNEY DISEASE) STAGE 4, GFR 15-29 ML/MIN (HCC): ICD-10-CM

## 2022-04-22 LAB
ORGANISM: ABNORMAL
URINE CULTURE, ROUTINE: ABNORMAL
URINE CULTURE, ROUTINE: ABNORMAL

## 2022-04-22 PROCEDURE — 1036F TOBACCO NON-USER: CPT | Performed by: NURSE PRACTITIONER

## 2022-04-22 PROCEDURE — 3017F COLORECTAL CA SCREEN DOC REV: CPT | Performed by: NURSE PRACTITIONER

## 2022-04-22 PROCEDURE — G8427 DOCREV CUR MEDS BY ELIG CLIN: HCPCS | Performed by: NURSE PRACTITIONER

## 2022-04-22 PROCEDURE — 99213 OFFICE O/P EST LOW 20 MIN: CPT | Performed by: NURSE PRACTITIONER

## 2022-04-22 PROCEDURE — G8417 CALC BMI ABV UP PARAM F/U: HCPCS | Performed by: NURSE PRACTITIONER

## 2022-04-22 RX ORDER — LOPERAMIDE HYDROCHLORIDE 2 MG/1
2 CAPSULE ORAL 4 TIMES DAILY PRN
Qty: 120 CAPSULE | Refills: 1 | Status: SHIPPED | OUTPATIENT
Start: 2022-04-22 | End: 2022-06-21

## 2022-04-22 RX ORDER — ALUMINUM ZIRCONIUM OCTACHLOROHYDREX GLY 16 G/100G
GEL TOPICAL
Qty: 425 G | Refills: 2 | Status: ON HOLD | OUTPATIENT
Start: 2022-04-22 | End: 2022-06-06 | Stop reason: HOSPADM

## 2022-04-22 NOTE — PROGRESS NOTES
denies  Illicit drug use: denies  NSAID use: denies     HPI from last GI clinic visit on 12/24/2021 summarized below:  59 y.o. female presents to the clinic with 1 month history of diarrhea and fecal urgency. Symptoms mainly after eating. Denies any change in health, denies any new medications, denies any abdominal pain or blood in the stool. Weight has gone up. Patient reports having normal bowel movements prior to this. Does not recall having episodes like this in the past however review of her medical chart reveals patient was seen approximately 7 years ago with diarrhea. Underwent colonoscopy with random colon biopsies at [facility]. Patient denies any recent antibiotic use, sick contacts, change in medications or any new medications. Patient reports having tried Pepto-Bismol and Imodium without any improvement in symptoms  Patient with multiple comorbidities, multiple medications. Diabetes with last HbA1c in August to 6.2. Patient on insulin. Chronic kidney disease on hemodialysis, patient receives dialysis on Tuesday Thursday and Saturday. History of hepatitis C, reports being treated and cleared the virus     Previous GI work up/Endoscopic investigations:   Colonoscopy: 1/9/2014: Normal colonic mucosa, random colon biopsies obtained, pathology report not available for review. Review of Systems   All other systems reviewed and are negative. Past medical history, past surgical history, medication list, social and familyhistory reviewed    Pulse 62, height 5' 7\" (1.702 m), weight 214 lb (97.1 kg), SpO2 98 %, not currently breastfeeding. Physical Exam  Constitutional:       General: She is not in acute distress. Appearance: Normal appearance. She is normal weight. She is not ill-appearing. Comments: Uses Rollator walker   HENT:      Head: Normocephalic and atraumatic. Eyes:      General: No scleral icterus.   Cardiovascular:      Rate and Rhythm: Normal rate and regular rhythm. Pulses: Normal pulses. Pulmonary:      Effort: Pulmonary effort is normal. No respiratory distress. Breath sounds: Normal breath sounds. Abdominal:      General: Bowel sounds are normal. There is no distension. Palpations: Abdomen is soft. There is no mass. Tenderness: There is no abdominal tenderness. There is no guarding or rebound. Comments: Central obesity   Musculoskeletal:         General: Normal range of motion. Lymphadenopathy:      Cervical: No cervical adenopathy. Skin:     General: Skin is warm and dry. Coloration: Skin is not jaundiced. Neurological:      Mental Status: She is alert and oriented to person, place, and time. Psychiatric:         Mood and Affect: Mood normal.         Behavior: Behavior normal.         Thought Content: Thought content normal.         Judgment: Judgment normal.       Laboratory, Pathology, Radiology reviewed in detail with relevantimportant investigations summarized below:    Recent Labs     04/20/22  1045 04/09/22  0322 04/08/22  1030   WBC 8.6 6.4 7.7   HGB 10.6* 9.7* 10.4*   HCT 31.4* 29.5* 30.9*   MCV 87.1 88.6 88.5    122* 103*     Lab Results   Component Value Date    WBC 8.6 04/20/2022    HGB 10.6 (L) 04/20/2022    HCT 31.4 (L) 04/20/2022    MCV 87.1 04/20/2022     04/20/2022     Lab Results   Component Value Date    ALT 22 04/20/2022    AST 26 04/20/2022    ALKPHOS 84 04/20/2022    BILITOT 0.6 04/20/2022     Component      Latest Ref Rng & Units 4/20/2022          11:54 AM   C.diff Toxin/Antigen       Negative for Clostridium difficile antigen and toxin .  . .     Component      Latest Ref Rng & Units 4/20/2022          10:45 AM   Adenovirus F 40 39 PCR      Not Detected Not Detected   Astrovirus PCR      Not Detected Not Detected   Campylobacter PCR      Not Detected Not Detected   Cryptosporidium PCR      Not Detected Not Detected   Cyclospora Cayetanensis PCR      Not Detected Not Detected   Entamoeba Histolytica PCR      Not Detected Not Detected   E Coli Enteroaggregative PCR      Not Detected Not Detected   E Coli Enteropathogenic PCR      Not Detected Not Detected   E Coli Enterotoxigenic PCR      Not Detected Not Detected   Giardia Lamblia PCR      Not Detected Not Detected   Norovirus GI GII PCR      Not Detected Not Detected   Plesiomonas Shigelloides PCR      Not Detected Not Detected   Rotavirus A PCR      Not Detected Not Detected   Salmonella PCR      Not Detected Not Detected   Sapovirus PCR      Not Detected Not Detected   Shiga-like Toxin-producing E. Coli (STEC) stx1/stx2      Not Detected Not Detected   E Coli Shigella/Enteroinvasive PCR      Not Detected Not Detected   Vibrio PCR      Not Detected Not Detected   Vibrio Cholerae PCR      Not Detected Not Detected   Yersinia Enterocolitica PCR      Not Detected Not Detected     CT scan abdomen and pelvis: 10/11/2021: No acute findings    CT ABDOMEN PELVIS WO CONTRAST Additional Contrast? None  Result Date: 4/20/2022  Small to moderate bilateral pleural effusions with left lower lung subsegmental dependent atelectasis/pneumonia. Hysterectomy. Remote stable T11 compression fracture. XR CHEST (2 VW)  Result Date: 4/20/2022  BORDERLINE CARDIOMEGALY. LEFT PLEURAL EFFUSION VERSUS THICKENING. LEFT LOWER LUNG SCARRING VERSUS ATELECTASIS/PNEUMONIA. Assessment and Plan:  Wayne Machuca 57 y.o. female with chronic diarrhea and nausea in the morning without emesis. Recent worsening of symptoms since November 2021 despite Pepto-Bismol and Imodium use. Patient with extensive medical history as noted below, on multiple medications. Recent ER visit for abdominal pain and diarrheal symptoms, stool studies for C. difficile and GI panel at that time negative. She has not completed her stool studies as previously ordered in December 2021.   Has not tried fiber supplementation.    -Suspect functional gastrointestinal disorder versus IBS versus polypharmacy as etiology for patient's symptoms. Discussed importance of completion of stool studies to further evaluate etiology of patient's diarrhea. -Given patient's extensive comorbidities, recent CABG/cardiac/renal history, will await stool studies and consider colonoscopy pending results. --Start fiber (ex. Metamucil) 1/2 to 1 tablespoon daily. The dose should then be slowly titrated up based on response to treatment. -- Continue Imodium 2 mg up to 4 times per day as needed  -- Discussed with patient importance of following up with cardiology for clearance regarding possible colonoscopy in the future    Associated medical conditions: Recent CABG (2 [id number]), diastolic CHF, mitral/tricuspid valve regurgitation, non-STEMI, renal failure (on hemodialysis Tuesday/Thursday/Saturday), HTN, hepatitis C, type 2 diabetes with neuropathy, sleep apnea, COPD (not on home O2), tardive dyskinesia, schizophrenia, vitamin B12 deficiency, HLD, class II obesity, chronic pain. Return for Follow up in GI clinic in 4-6 weeks. LORETO Oden - CNP   Staff Gastroenterology Nurse Practitioner  [facility]    Please note this report has been partially produced using speech recognition software and contain errors related to that system including grammar, punctuation and spelling as well as words and phrases that may seem inappropriate. If there are questions or concerns please feel free to contact me to clarify.

## 2022-04-22 NOTE — PATIENT INSTRUCTIONS
-Start fiber (ex. Metamucil) 1 tablespoon daily. The dose should then be slowly increased based on response to treatment.  -Continue taking imodium up to 4 times per day as needed  - Call the office to let us know if cardiologist is ok with proceeding with colonoscopy at this time.

## 2022-04-23 PROBLEM — N18.4 CKD (CHRONIC KIDNEY DISEASE) STAGE 4, GFR 15-29 ML/MIN (HCC): Status: ACTIVE | Noted: 2022-04-23

## 2022-04-25 ENCOUNTER — OFFICE VISIT (OUTPATIENT)
Dept: CARDIOLOGY CLINIC | Age: 62
End: 2022-04-25
Payer: MEDICARE

## 2022-04-25 VITALS
WEIGHT: 221 LBS | HEART RATE: 71 BPM | DIASTOLIC BLOOD PRESSURE: 70 MMHG | BODY MASS INDEX: 34.61 KG/M2 | SYSTOLIC BLOOD PRESSURE: 110 MMHG | OXYGEN SATURATION: 95 %

## 2022-04-25 DIAGNOSIS — R26.2 DIFFICULTY IN WALKING: Primary | ICD-10-CM

## 2022-04-25 DIAGNOSIS — I10 ESSENTIAL HYPERTENSION: ICD-10-CM

## 2022-04-25 DIAGNOSIS — Z95.1 S/P SINGLE VESSEL CORONARY ARTERY BYPASS: ICD-10-CM

## 2022-04-25 DIAGNOSIS — E78.2 MIXED HYPERLIPIDEMIA: ICD-10-CM

## 2022-04-25 DIAGNOSIS — I25.119 CORONARY ARTERY DISEASE INVOLVING NATIVE CORONARY ARTERY OF NATIVE HEART WITH ANGINA PECTORIS (HCC): ICD-10-CM

## 2022-04-25 DIAGNOSIS — I31.39 PERICARDIAL EFFUSION: ICD-10-CM

## 2022-04-25 DIAGNOSIS — I10 HYPERTENSION, UNSPECIFIED TYPE: ICD-10-CM

## 2022-04-25 DIAGNOSIS — R29.6 RECURRENT FALLS: ICD-10-CM

## 2022-04-25 DIAGNOSIS — I95.3 HEMODIALYSIS-ASSOCIATED HYPOTENSION: ICD-10-CM

## 2022-04-25 DIAGNOSIS — I25.119 CORONARY ARTERY DISEASE INVOLVING NATIVE HEART WITH ANGINA PECTORIS, UNSPECIFIED VESSEL OR LESION TYPE (HCC): ICD-10-CM

## 2022-04-25 PROCEDURE — G8427 DOCREV CUR MEDS BY ELIG CLIN: HCPCS | Performed by: INTERNAL MEDICINE

## 2022-04-25 PROCEDURE — 3017F COLORECTAL CA SCREEN DOC REV: CPT | Performed by: INTERNAL MEDICINE

## 2022-04-25 PROCEDURE — G8417 CALC BMI ABV UP PARAM F/U: HCPCS | Performed by: INTERNAL MEDICINE

## 2022-04-25 PROCEDURE — 1036F TOBACCO NON-USER: CPT | Performed by: INTERNAL MEDICINE

## 2022-04-25 PROCEDURE — 99214 OFFICE O/P EST MOD 30 MIN: CPT | Performed by: INTERNAL MEDICINE

## 2022-04-25 ASSESSMENT — ENCOUNTER SYMPTOMS
COUGH: 0
EYES NEGATIVE: 1
GASTROINTESTINAL NEGATIVE: 1
NAUSEA: 0
STRIDOR: 0
BLOOD IN STOOL: 0
SHORTNESS OF BREATH: 0
WHEEZING: 0
RESPIRATORY NEGATIVE: 1
CHEST TIGHTNESS: 0

## 2022-04-25 NOTE — PROGRESS NOTES
Subsequent Progress Note  Patient: Justine Salgado  YOB: 1960  MRN: [id number]    Chief Complaint: fall CAD HTN HPL  Chief Complaint   Patient presents with    1 Aries Nails pt   CV Data:  6/2020 Echo EF 60  Mild mR  RVSP 46   7/2020 LAD DEWEY. She has prior stents as well.   8/21 Echo EF 60  8/2021 LAD recurrent ISR with double layer stents. Went to [facility] and had redo stent. 12/21 CUS B/l ICA 50-69  12/21 Spec tnegative  1/12/22 Cath LAD IST   1/2022 CABG LIMA - LAD + TV Repair complicated by Tamponade and pericardial window. 4/25/22 eCHO ef 60 NO pe rvsp 68    HD T Th Sat  Subjective/HPI: TELEHEALTH EVALUATION -- Audio/Visual (During MNRDT-14 public health emergency)    Pt is at a nursing home now. No cp occ SOB no falls no bleed. Takes meds. Recently Plavix stopped and Brilinta added. Had recerrnet admissions for CP    10/28/2020 started HD( T Thur and Sat). Past 3 days gaine ~ 10 LBS according to her scale at home and HD. She is not short of breath and denies CP. She is here due to discrepancy in weight. 2/10/21 TELEHEALTH EVALUATION -- Audio/Visual (During ZBWID-54 public health emergency)    At [facility]. No further falls no cp some sob eats well takes meds. Will go home net week. Doing well w HD    9/10/21 after LAD stent at [facility] feels better no cp no sob no falls no bleed. 10/27/21 recent er for CP and fall. BP was low. Some parikh. No bleed. Takes meds. Often dizzy    3/24/22 had CABG + TV repairand Pericaridal window. Ws in hosp > 2 week. s weak and tired. starign to move some. 4/25/22 still with sternal discomfort with motion. No angina. No sob no falls no bleed.      EKG: SR 66 RBBB    LIFE LONG Nonsmoker  Lives with daughter      Past Medical History:   Diagnosis Date    Angina at rest [facility]) 5/24/2020    Anxiety     CAD S/P percutaneous coronary angioplasty 2015, 2018    stents per dr Adri Avina, [place]    CHF (congestive heart failure) ([facility])     CKD (chronic kidney disease) stage 4, GFR 15-29 ml/min ([facility]) 2018    CKD stage 4 due to type 2 diabetes mellitus ([address]     Contusion of right chest wall 2021    COPD (chronic obstructive pulmonary disease) ([facility])     Diabetic nephropathy with proteinuria ([address]     DJD (degenerative joint disease) of knee     Dr Tigist Mendoza GERD (gastroesophageal reflux disease)     Hemiparesis, left ([address] 2013    entered Assisted Living ([facility])    Hemodialysis patient [facility])     Hemodialysis-associated hypotension 10/22/2021    History of heart failure     History of seizures     History of type C viral hepatitis     HTN (hypertension)     Hyperlipidemia     Impaired mobility and activities of daily living     Mediastinal lymphadenopathy     Dr Jasmine Shea Shalom Carbon    Metabolic syndrome     Moderate persistent asthma without complication     Need for extended care facility 2021    Neurogenic urinary incontinence 2013    Neuropathy in diabetes [facility])     Nonrheumatic mitral valve regurgitation 2021    Nonrheumatic tricuspid valve regurgitation 2021    Obesity (BMI 30-39. 9)     Recurrent UTI     S/P colonoscopy     [facility], focal active colitis    Schizophrenia, paranoid, chronic ([address]     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 Yajaira Berman Vijay Hilario      x1 Dr. Marah Bingham, Dr Diehl Finders 2018    Vijay Hilario  08/10/2021    [facility]    DIAGNOSTIC CARDIAC CATH LAB PROCEDURE  10/02/2019    HYSTERECTOMY, TOTAL ABDOMINAL      one ovary intact, Dr Rachele Escalante, menorrhagia    NM TOTAL KNEE ARTHROPLASTY Left 6/21/2018    LEFT KNEE TOTAL KNEE ARTHROPLASTY, SHAYNA, NERVE BLOCK performed by Myriam Willams MD at [address] [place] [address] PTCA      TOE AMPUTATION Right     TOTAL KNEE ARTHROPLASTY  05/19/16    Dr Pugh Never TUNNELED [address] Right 07/01/2020    tunneled HD catheter per Dr Roger Lopez       Family History   Problem Relation Age of Onset   Arbret Moritz Cancer Mother 76        survived   Ardyth Moritz Breast Cancer Mother     Hypertension Father     Diabetes Sister     Mental Illness Sister     Colon Cancer Neg Hx        Social History     Socioeconomic History    Marital status:      Spouse name: Not on file    Number of children: 2    Years of education: Not on file    Highest education level: Not on file   Occupational History    Occupation: disabled   Tobacco Use    Smoking status: Never Smoker    Smokeless tobacco: Never Used   Vaping Use    Vaping Use: Never used   Substance and Sexual Activity    Alcohol use: No     Alcohol/week: 0.0 standard drinks    Drug use: No    Sexual activity: Not Currently   Other Topics Concern    Not on file   Social History Narrative    Born in [place], one of 5    Twin sister Reyes, very ill in 2018, [place] 2019    Moved to [facility], , 2 children, one son and one daughter    Worked at [employer], as a nurse's aide    Disabled due to mental illness    Lived at [employer], was discharged, returned to independent living in 2017 in the daughter's house and has adjusted well    One son and one daughter, live in the same house with patient, Max Argueta pays the rent    [employer] reading ([employer])        10/11/2021 [facility] updates; patient lives with her daughter son-in-law and 2 grandchildren and patient's handicapped son. Per daughter, her brother is blind, MRDD, multiple health issues. Daughter's  is patient's legal guardian. Patient has hemodialysis Tuesday Thursday and Saturday. Patient's bedroom is on main floor with a half bath. Daughter walks patient upstairs once weekly for full bath. Patient is using her walker in the home. Patient has a hospital bed in the home. Social Determinants of Health     Financial Resource Strain: Low Risk     Difficulty of Paying Living Expenses: Not hard at all   Food Insecurity: No Food Insecurity    Worried About Running Out of Food in the Last Year: Never true    Yung of Food in the Last Year: Never true   Transportation Needs: No Transportation Needs    Lack of Transportation (Medical): No    Lack of Transportation (Non-Medical):  No   Physical Activity:     Days of Exercise per Week: Not on file    Minutes of Exercise per Session: Not on file   Stress:     Feeling of Stress : Not on file   Social Connections:     Frequency of Communication with Friends and Family: Not on file    Frequency of Social Gatherings with Friends and Family: Not on file    Attends Druze Services: Not on file    Active Member of [address] [place] [address] [employer] or Organizations: Not on file    Attends Club or Organization Meetings: Not on file    Marital Status: Not on file   Intimate Partner Violence:     Fear of Current or Ex-Partner: Not on file    Emotionally Abused: Not on file    Physically Abused: Not on file    Sexually Abused: Not on file   Housing Stability:     Unable to Pay for Housing in the Last Year: Not on file    Number of [place] in the Last Year: Not on file    Unstable Housing in the Last Year: Not on file       Allergies   Allergen Reactions    Codeine Hives     hives    Oxycontin [Oxycodone Hcl] Hives       Current Outpatient Medications   Medication Sig Dispense Refill  psyllium (METAMUCIL SMOOTH TEXTURE) 58.6 % powder Take 2 teaspoon with 8 to 10 oz water. 425 g 2    loperamide (RA ANTI-DIARRHEAL) 2 MG capsule Take 1 capsule by mouth 4 times daily as needed for Diarrhea 120 capsule 1    sulfamethoxazole-trimethoprim (BACTRIM DS;SEPTRA DS) 800-160 MG per tablet Take 1 tablet by mouth 2 times daily for 7 days 14 tablet 0    pregabalin (LYRICA) 75 MG capsule TAKE 1 CAPSULE BY MOUTH TWICE DAILY 60 capsule 0    ARIPiprazole (ABILIFY) 5 MG tablet TAKE 1 TABLET BY MOUTH ONCE DAILY 30 tablet 0    potassium chloride (KLOR-CON M) 20 MEQ extended release tablet Take 2 tablets by mouth 2 times daily (with meals) 60 tablet 3    isosorbide mononitrate (IMDUR) 30 MG extended release tablet Take 120 mg by mouth daily      insulin aspart (NOVOLOG FLEXPEN) 100 UNIT/ML injection pen 15 UNITS PLUS SLIDING SCALE   LESS THAN 180 NONE  181-250 2 UNITS  251-300 4 UNITS  301-400 6 UNITS   401-500 10 UNITS 10 pen 3    melatonin 10 MG CAPS capsule Take 1 capsule by mouth nightly 30 capsule 12    furosemide (LASIX) 20 MG tablet Take 5 tablets by mouth 2 times daily 60 tablet 3    ondansetron (ZOFRAN) 4 MG tablet Take 1 tablet by mouth as needed for nausea/vomiting 3 times a day as needed 60 tablet 3    midodrine (PROAMATINE) 5 MG tablet Take 1 tablet by mouth one time a day every Tue, Thu, Sat for hypotension. Give 30 mins prior to dialysis. 90 tablet 3    Handicap Placard MISC by Does not apply route Expiration in 5 years. 1 each 0    sertraline (ZOLOFT) 50 MG tablet TAKE 1 TABLET BY MOUTH DAILY 30 tablet 2    vitamin B-12 (CYANOCOBALAMIN) 100 MCG tablet TAKE 1 TABLET BY MOUTH ONCE DAILY 90 tablet 4    fluticasone (FLONASE) 50 MCG/ACT nasal spray 1 spray by Each Nostril route daily      SITagliptin (JANUVIA) 50 MG tablet Take 50 mg by mouth daily      lidocaine-prilocaine (EMLA) 2.5-2.5 % cream Apply topically as needed for Pain Apply topically as needed.  3 times a week before dialysis wrap with saran wrap      triamcinolone (KENALOG) 0.1 % cream Apply topically daily Apply topically 2 times daily.  albuterol (PROVENTIL) (2.5 MG/3ML) 0.083% nebulizer solution Take 3 mLs by nebulization every 4 hours as needed for Wheezing 120 each 3    hydrOXYzine (ATARAX) 25 MG tablet TAKE ONE TABLET BY MOUTH TWO TIMES A DAY      NYAMYC 757283 UNIT/GM powder APPLY TO AFFECTED AREA OF ABDOMINAL FOLDS EVERY 12 HOURS AS NEEDED      insulin glargine (LANTUS SOLOSTAR) 100 UNIT/ML injection pen 70 UNITS AT BEDTIME 30 pen 3    blood glucose test strips (ONETOUCH VERIO) strip  each 3    OneTouch Delica Lancets 84C MISC  each 3    Blood Glucose Monitoring Suppl (ONETOUCH VERIO) w/Device KIT AS DIRECTED 1 kit 0    pantoprazole (PROTONIX) 20 MG tablet TAKE 1 TABLET BY MOUTH DAILY 30 tablet 11    Insulin Pen Needle (SURE COMFORT PEN NEEDLES) 30G X 8 MM MISC USE AS DIRECTED FIVE TIMES A DAILY 100 each 10    b complex-C-folic acid (NEPHROCAPS) 1 MG capsule Take 1 capsule by mouth daily      LANTUS SOLOSTAR 100 UNIT/ML injection pen 55 units at bedtime 10 pen 10    metoprolol tartrate (LOPRESSOR) 25 MG tablet TAKE 1/2 TABLET BY MOUTH TWO TIMES DAILY  (Patient taking differently: Take 25 mg by mouth 2 times daily ) 90 tablet 4    insulin aspart (NOVOLOG FLEXPEN) 100 UNIT/ML injection pen INJECT 8-10  units with each meals 10 pen 3    aspirin EC 81 MG EC tablet Take 1 tablet by mouth daily 30 tablet 12    magnesium oxide (MAG-OX) 400 MG tablet Take 1 tablet by mouth daily 90 tablet 4    atorvastatin (LIPITOR) 40 MG tablet Take 1 tablet by mouth daily 90 tablet 4    blood glucose test strips (FREESTYLE LITE) strip 1 each by Does not apply route 4 times daily (before meals and nightly) As needed.  200 strip 5    Alcohol Swabs (EASY TOUCH ALCOHOL PREP MEDIUM) 70 % PADS USE AS DIRECTED THREE TIMES A  each 3    FreeStyle Lancets MISC Test 4x daily 150 each 3    docusate sodium (COLACE, DULCOLAX) 100 MG CAPS Take 100 mg by mouth 2 times daily For the prevention and treatment of constipation while taking pain medications. 30 capsule 0    acetaminophen (TYLENOL) 325 MG tablet Take 2 tablets by mouth every 4 hours as needed for Pain (For mild to moderate pain (Pain 1-6 out of 10 on pain scale)) 120 tablet 0    Blood Glucose Monitoring Suppl (FREESTYLE LITE) CORETTA 1 Device by Does not apply route daily as needed (Diabetes) Use freestyle meter to test blood sugar as needed 1 Device 0    nitroGLYCERIN (NITROSTAT) 0.4 MG SL tablet Place 1 tablet under the tongue every 5 minutes as needed for Chest pain       No current facility-administered medications for this visit. Review of Systems:   Review of Systems   Constitutional: Negative. Negative for diaphoresis and fatigue. HENT: Negative. Eyes: Negative. Respiratory: Negative. Negative for cough, chest tightness, shortness of breath, wheezing and stridor. Cardiovascular: Negative. Negative for chest pain, palpitations and leg swelling. Gastrointestinal: Negative. Negative for blood in stool and nausea. Genitourinary: Negative. Musculoskeletal: Negative. Skin: Negative. Neurological: Negative. Negative for dizziness, syncope, weakness and light-headedness. Hematological: Negative. Psychiatric/Behavioral: Negative. Physical Examination:    /70 (Site: Left Upper Arm, Position: Sitting, Cuff Size: Large Adult)   Pulse 71   Wt 221 lb (100.2 kg)   LMP  (LMP Unknown)   SpO2 95%   BMI 34.61 kg/m²    Physical Exam   Constitutional: She appears healthy. No distress. HENT:   Normal cephalic and Atraumatic   Eyes: Pupils are equal, round, and reactive to light. Neck: Thyroid normal. No JVD present. No neck adenopathy. No thyromegaly present. Cardiovascular: Normal rate, regular rhythm, intact distal pulses and normal pulses. Murmur heard.   Pulmonary/Chest: Effort normal and breath sounds normal. She has no wheezes. She has no rales. She exhibits no tenderness. Abdominal: Soft. Bowel sounds are normal. There is no abdominal tenderness. Musculoskeletal:         General: No tenderness or edema. Normal range of motion. Cervical back: Normal range of motion and neck supple. Neurological: She is alert and oriented to person, place, and time. Skin: Skin is warm. No cyanosis. Nails show no clubbing.        LABS:  CBC:   Lab Results   Component Value Date    WBC 8.6 04/20/2022    RBC 3.61 04/20/2022    HGB 10.6 04/20/2022    HCT 31.4 04/20/2022    MCV 87.1 04/20/2022    MCH 29.4 04/20/2022    MCHC 33.8 04/20/2022    RDW 18.8 04/20/2022     04/20/2022    MPV 10.0 03/05/2020     Lipids:  Lab Results   Component Value Date    CHOL 70 04/09/2022    CHOL 123 01/13/2022    CHOL 101 06/11/2020     Lab Results   Component Value Date    TRIG 92 04/09/2022    TRIG 228 (H) 01/13/2022    TRIG 82 06/11/2020     Lab Results   Component Value Date    HDL 33 (L) 04/09/2022    HDL 39 (L) 01/13/2022    HDL 48 06/11/2020     Lab Results   Component Value Date    LDLCALC 19 04/09/2022    LDLCALC 38 01/13/2022    LDLCALC 37 06/11/2020     Lab Results   Component Value Date    LABVLDL 59.0 05/04/2013    VLDL 43 01/30/2017     Lab Results   Component Value Date    CHOLHDLRATIO 4.32 01/30/2017     CMP:    Lab Results   Component Value Date     04/20/2022    K 4.1 04/20/2022    K 3.9 08/01/2021    CL 97 04/20/2022    CO2 28 04/20/2022    BUN 11 04/20/2022    CREATININE 3.64 04/20/2022    GFRAA 15.2 04/20/2022    LABGLOM 12.6 04/20/2022    GLUCOSE 111 04/20/2022    GLUCOSE 419 07/30/2021    PROT 6.8 04/20/2022    LABALBU 4.0 04/20/2022    CALCIUM 9.7 04/20/2022    BILITOT 0.6 04/20/2022    ALKPHOS 84 04/20/2022    AST 26 04/20/2022    ALT 22 04/20/2022     BMP:    Lab Results   Component Value Date     04/20/2022    K 4.1 04/20/2022    K 3.9 08/01/2021    CL 97 04/20/2022    CO2 28 04/20/2022    BUN 11 04/20/2022 LABALBU 4.0 04/20/2022    CREATININE 3.64 04/20/2022    CALCIUM 9.7 04/20/2022    GFRAA 15.2 04/20/2022    LABGLOM 12.6 04/20/2022    GLUCOSE 111 04/20/2022    GLUCOSE 419 07/30/2021     Magnesium:    Lab Results   Component Value Date    MG 1.9 04/09/2022     TSH:  Lab Results   Component Value Date    TSH 0.856 07/16/2021       Patient Active Problem List   Diagnosis    Atherosclerotic heart disease of native coronary artery with unspecified angina pectoris (HCC)    Schizophrenia, paranoid, chronic    Metabolic syndrome    Vitamin B 12 deficiency    Cerebral microvascular disease    Mixed hyperlipidemia    Other hammer toe (acquired)    Vitamin D insufficiency    Incontinence of urine    Diabetic nephropathy with proteinuria ([address]    Essential (primary) hypertension    History of type C viral hepatitis    Urinary incontinence due to cognitive impairment    History of seizures    Stented coronary artery-plan is to stay on Plavix indefinately per Dr Kermit Talley Other specified diabetes mellitus with diabetic neuropathy, unspecified ([address]    Controlled type 2 diabetes mellitus with diabetic neuropathy, with long-term current use of insulin (HCC)    Hemiparesis, left (HCC)    Angina, class II ([address]    Pain, unspecified    Tardive dyskinesia    Shortness of breath    Chronic diastolic congestive heart failure (HCC)    Sleep apnea, unspecified    Pulmonary hypertension, unspecified (HCC)    Class 2 severe obesity with serious comorbidity and body mass index (BMI) of 36.0 to 36.9 in adult ([address]    Edema    Closed supracondylar fracture of right humerus    Other chronic pain    Palliative care patient    Recurrent falls    Renal failure    Difficulty in walking    ESRD (end stage renal disease) on dialysis (HCC)    Weakness    Other seizures (HCC)    Moderate persistent asthma without complication    [id number]    Post PTCA    Falls frequently    Chest wall contusion, left, initial encounter    Headache, unspecified    Paranoid schizophrenia ([facility]    Compression fracture of spine ([facility]    Closed rib fracture    Depression    Chronic obstructive pulmonary disease (HCC)    Critical illness polyneuropathy ([facility]    Multiple closed fractures of ribs of right side    Nonrheumatic mitral valve regurgitation    Nonrheumatic tricuspid valve regurgitation    Need for extended care facility    Chronic pain of both shoulders    Hemodialysis-associated hypotension    Anginal chest pain at rest [facility])    Chest pain    Unstable angina ([facility]    NSTEMI (non-ST elevated myocardial infarction) ([facility])    CKD (chronic kidney disease) stage 4, GFR 15-29 ml/min ([facility])       There are no discontinued medications. Modified Medications    No medications on file       No orders of the defined types were placed in this encounter. Assessment/Plan:    1. Coronary artery disease involving native heart with angina pectoris, unspecified vessel or lesion type ([facility]    2. CABG and TV repair with pericaridal window- repeat Echo. Will need Cardiac Rehab but not ready yet. Not ready for cardaic Rehab yet. Echo no further PE.   2. Diastolic congestive heart failure, unspecified HF chronicity (HCC)  Stable. No edema. - continue meds. Low salt diet    3. Stented coronary artery    4. Shortness of breath   stable - no worse    5. Mixed hyperlipidemia  Statin - long term. Check labs. Low fat diet. 6. Essential hypertension BP low- stop Imdur 120 for now. 7. Coronary artery disease involving native coronary artery of native heart with angina pectoris ([facility]    8 dizzy/falls- CUS. - moderate B/L- will continue surveillance. Counseling:  Heart Healthy Lifestyle, Low Salt Diet, Take Precautions to Prevent Falls and Walk Daily    Return in about 2 months (around 6/25/2022).       Electronically signed by Yola Dennis MD on 4/25/2022 at 12:28 PM

## 2022-04-27 ENCOUNTER — CARE COORDINATION (OUTPATIENT)
Dept: CARE COORDINATION | Age: 62
End: 2022-04-27

## 2022-04-27 NOTE — CARE COORDINATION
ACM called patient. Left message requesting a return call for [facility] out reach. Contact information supplied.

## 2022-04-28 ASSESSMENT — ENCOUNTER SYMPTOMS: EYES NEGATIVE: 1

## 2022-04-29 ENCOUNTER — CARE COORDINATION (OUTPATIENT)
Dept: CARE COORDINATION | Age: 62
End: 2022-04-29

## 2022-04-29 NOTE — CARE COORDINATION
[facility] received a return call from patient. Patient states she is doing well. She is at home. She is doing physical therapy 3 times a week outpatient. She reports no care coordination needs at this time. Patient was getting on the bus to go get dialysis. Patient has [facility] contact information if her needs change.

## 2022-05-02 DIAGNOSIS — I10 ESSENTIAL (PRIMARY) HYPERTENSION: ICD-10-CM

## 2022-05-02 RX ORDER — MAGNESIUM OXIDE 400 MG/1
400 TABLET ORAL DAILY
Qty: 30 TABLET | Refills: 12 | Status: SHIPPED | OUTPATIENT
Start: 2022-05-02

## 2022-05-02 RX ORDER — ATORVASTATIN CALCIUM 40 MG/1
40 TABLET, FILM COATED ORAL DAILY
Qty: 30 TABLET | Refills: 12 | Status: ON HOLD | OUTPATIENT
Start: 2022-05-02 | End: 2022-07-16 | Stop reason: SDUPTHER

## 2022-05-02 RX ORDER — AMLODIPINE BESYLATE 10 MG/1
TABLET ORAL
Qty: 30 TABLET | Refills: 10 | OUTPATIENT
Start: 2022-05-02

## 2022-05-02 NOTE — TELEPHONE ENCOUNTER
pharmacy requesting medication refill.  Please approve or deny this request.    Rx requested:  Requested Prescriptions     Pending Prescriptions Disp Refills    atorvastatin (LIPITOR) 40 MG tablet [Pharmacy Med Name: ATORVASTATIN 40MG TAB 40 Tablet] 30 tablet 10     Sig: TAKE 1 TABLET BY MOUTH DAILY    magnesium oxide (MAG-OX) 400 MG tablet [Pharmacy Med Name: MAGNESIUM OX 400MG  MG Tablet] 30 tablet 10     Sig: TAKE 1 TABLET BY MOUTH DAILY         Last Office Visit:   10/22/2021      Next Visit Date:  Future Appointments   Date Time Provider Aminata Cortes   5/13/2022 10:30 AM Prema Simmons MD [facility]   5/13/2022 10:45 AM rPema Simmons MD [facility]   5/25/2022 10:30 AM Gwen Perry APRN - CNP [address]   6/29/2022  1:15 PM Bronwyn Landeros  [facility]   7/13/2022  2:30 PM Pari Monteiro MD [facility]

## 2022-05-04 ENCOUNTER — TELEPHONE (OUTPATIENT)
Dept: CARDIOLOGY CLINIC | Age: 62
End: 2022-05-04

## 2022-05-04 ENCOUNTER — TELEPHONE (OUTPATIENT)
Dept: FAMILY MEDICINE CLINIC | Age: 62
End: 2022-05-04

## 2022-05-04 DIAGNOSIS — Z79.4 CONTROLLED TYPE 2 DIABETES MELLITUS WITH DIABETIC NEUROPATHY, WITH LONG-TERM CURRENT USE OF INSULIN (HCC): ICD-10-CM

## 2022-05-04 DIAGNOSIS — F33.1 MODERATE EPISODE OF RECURRENT MAJOR DEPRESSIVE DISORDER (HCC): ICD-10-CM

## 2022-05-04 DIAGNOSIS — I10 ESSENTIAL (PRIMARY) HYPERTENSION: Primary | ICD-10-CM

## 2022-05-04 DIAGNOSIS — E11.40 CONTROLLED TYPE 2 DIABETES MELLITUS WITH DIABETIC NEUROPATHY, WITH LONG-TERM CURRENT USE OF INSULIN (HCC): ICD-10-CM

## 2022-05-04 DIAGNOSIS — G62.81 CRITICAL ILLNESS POLYNEUROPATHY (HCC): ICD-10-CM

## 2022-05-04 NOTE — TELEPHONE ENCOUNTER
Patient is requesting an order for briefs, size large, pull up style,  be sent to [facility] phone number [phone]

## 2022-05-04 NOTE — TELEPHONE ENCOUNTER
Danielle Dickerson from [address] calling. She was at patient's house and checking meds and she has been off imdur and ranexa since discharge from [facility] for CABG 2-2022. She is still experiencing angina. Should she be on 1 of those meds?     Danielle Dickerson [phone]

## 2022-05-04 NOTE — TELEPHONE ENCOUNTER
Please approve or deny this refill request. The order is pended. Thank you.     LOV 4/25/2022    Next Visit Date:  Future Appointments   Date Time Provider Aminata Quinterosi   5/13/2022 10:30 AM Keesha Steele MD [facility]   5/13/2022 10:45 AM Keesha Steele MD [facility]   5/25/2022 10:30 AM LORETO Nicholson - CNP [facility]   6/29/2022  1:15 PM Elizabeth Carrillo MD [address] [place] [address]   7/13/2022  2:30 PM Christian Skinner MD [facility]

## 2022-05-05 ENCOUNTER — TELEPHONE (OUTPATIENT)
Dept: ENDOCRINOLOGY | Age: 62
End: 2022-05-05

## 2022-05-05 DIAGNOSIS — E11.40 CONTROLLED TYPE 2 DIABETES MELLITUS WITH DIABETIC NEUROPATHY, WITH LONG-TERM CURRENT USE OF INSULIN (HCC): ICD-10-CM

## 2022-05-05 DIAGNOSIS — Z79.4 CONTROLLED TYPE 2 DIABETES MELLITUS WITH DIABETIC NEUROPATHY, WITH LONG-TERM CURRENT USE OF INSULIN (HCC): ICD-10-CM

## 2022-05-05 DIAGNOSIS — G62.81 CRITICAL ILLNESS POLYNEUROPATHY (HCC): ICD-10-CM

## 2022-05-05 DIAGNOSIS — I50.32 CHRONIC DIASTOLIC CONGESTIVE HEART FAILURE (HCC): ICD-10-CM

## 2022-05-05 DIAGNOSIS — F33.1 MODERATE EPISODE OF RECURRENT MAJOR DEPRESSIVE DISORDER (HCC): ICD-10-CM

## 2022-05-05 RX ORDER — ARIPIPRAZOLE 5 MG/1
TABLET ORAL
Qty: 30 TABLET | Refills: 10 | OUTPATIENT
Start: 2022-05-05

## 2022-05-05 RX ORDER — ISOSORBIDE MONONITRATE 30 MG/1
120 TABLET, EXTENDED RELEASE ORAL DAILY
Qty: 30 TABLET | Refills: 3 | Status: SHIPPED | OUTPATIENT
Start: 2022-05-05 | End: 2022-07-28

## 2022-05-05 RX ORDER — PREGABALIN 75 MG/1
CAPSULE ORAL
Qty: 60 CAPSULE | Refills: 0 | OUTPATIENT
Start: 2022-05-05 | End: 2022-06-04

## 2022-05-05 NOTE — TELEPHONE ENCOUNTER
Brody Apple from [address] called, Faith Medina had heart surgery and was in the nursing home after. While she was in the nursing home she was given Januvia the whole time but she was just discharged  with none. They want to know should she be on it?  Call Brody Apple back at [phone]

## 2022-05-05 NOTE — TELEPHONE ENCOUNTER
pharmacy requesting medication refill.  Please approve or deny this request.    Rx requested:  Requested Prescriptions     Pending Prescriptions Disp Refills    furosemide (LASIX) 20 MG tablet [Pharmacy Med Name: Furosemide Oral Tablet 20 MG] 60 tablet 0     Sig: TAKE 5 TABLETS BY MOUTH 2 TIMES A DAY    pregabalin (LYRICA) 75 MG capsule 60 capsule 0    ARIPiprazole (ABILIFY) 5 MG tablet 30 tablet 0         Last Office Visit:   3/16/2022      Next Visit Date:  Future Appointments   Date Time Provider Aminata Cortes   5/13/2022 10:30 AM Shruti Ramirez MD [address]   5/13/2022 10:45 AM MD Lacie Lau[address]   5/25/2022 10:30 AM LORETO Borges - CNP [facility] Pel[facility]   6/29/2022  1:15 PM Sam Couch MD [address] [place] [address]   7/13/2022  2:30 PM Denilson Augustin MD [address] [place] [address]

## 2022-05-06 ENCOUNTER — TELEPHONE (OUTPATIENT)
Dept: FAMILY MEDICINE CLINIC | Age: 62
End: 2022-05-06

## 2022-05-06 RX ORDER — FUROSEMIDE 20 MG/1
TABLET ORAL
Qty: 60 TABLET | Refills: 0 | OUTPATIENT
Start: 2022-05-06

## 2022-05-06 RX ORDER — PREGABALIN 75 MG/1
75 CAPSULE ORAL 2 TIMES DAILY
Qty: 60 CAPSULE | Refills: 0 | Status: SHIPPED | OUTPATIENT
Start: 2022-05-06 | End: 2022-05-16

## 2022-05-06 RX ORDER — INSULIN ASPART 100 [IU]/ML
INJECTION, SOLUTION INTRAVENOUS; SUBCUTANEOUS
Qty: 10 PEN | Refills: 3 | Status: ON HOLD | OUTPATIENT
Start: 2022-05-06 | End: 2022-07-16 | Stop reason: HOSPADM

## 2022-05-06 RX ORDER — ARIPIPRAZOLE 5 MG/1
TABLET ORAL
Qty: 30 TABLET | Refills: 0 | Status: SHIPPED | OUTPATIENT
Start: 2022-05-06 | End: 2022-05-16

## 2022-05-06 NOTE — TELEPHONE ENCOUNTER
Patient called in and states her paperwork for her briefs have not been signed by the doctor yet. This has been faxed over 2xs.      Please advise and thank you

## 2022-05-06 NOTE — TELEPHONE ENCOUNTER
pharmacy requesting medication refill.  Please approve or deny this request.    Rx requested:  Requested Prescriptions     Pending Prescriptions Disp Refills    [address] [address] UNIT/GM powder [Pharmacy Med Name: Westley Ward PWD 60GM 100,000U 985032/G Powder] 60 g 5     Sig: APPLY TO AFFECTED AREA OF ABDOMINAL FOLDS EVERY 12 HOURS AS NEEDED         Last Office Visit:   10/22/2021      Next Visit Date:  Future Appointments   Date Time Provider Aminata Cortes   5/13/2022 10:30 AM Sophia Winters MD [facility]   5/13/2022 10:45 AM Sophia Winters MD [facility]   5/25/2022 10:30 AM LORETO Vazquez [facility]   6/29/2022  1:15 PM Gino Romero MD [address] [place] [address]   7/13/2022  2:30 PM Mahesh Marcelo MD [facility]

## 2022-05-07 RX ORDER — NYSTATIN 100000 [USP'U]/G
POWDER TOPICAL
Qty: 60 G | Refills: 5 | Status: SHIPPED | OUTPATIENT
Start: 2022-05-07

## 2022-05-09 NOTE — TELEPHONE ENCOUNTER
1st attempt to reach patient. Called patient @   [phone] and left message on machine for patient to return call during normal business hours of 8:30 AM and 5 PM @ [phone].

## 2022-05-16 DIAGNOSIS — F33.1 MODERATE EPISODE OF RECURRENT MAJOR DEPRESSIVE DISORDER (HCC): ICD-10-CM

## 2022-05-16 DIAGNOSIS — Z79.4 CONTROLLED TYPE 2 DIABETES MELLITUS WITH DIABETIC NEUROPATHY, WITH LONG-TERM CURRENT USE OF INSULIN (HCC): ICD-10-CM

## 2022-05-16 DIAGNOSIS — E11.40 CONTROLLED TYPE 2 DIABETES MELLITUS WITH DIABETIC NEUROPATHY, WITH LONG-TERM CURRENT USE OF INSULIN (HCC): ICD-10-CM

## 2022-05-16 DIAGNOSIS — G62.81 CRITICAL ILLNESS POLYNEUROPATHY (HCC): ICD-10-CM

## 2022-05-16 RX ORDER — PREGABALIN 75 MG/1
CAPSULE ORAL
Qty: 60 CAPSULE | Refills: 0 | Status: ON HOLD | OUTPATIENT
Start: 2022-05-16 | End: 2022-06-02

## 2022-05-16 RX ORDER — ARIPIPRAZOLE 5 MG/1
TABLET ORAL
Qty: 30 TABLET | Refills: 0 | Status: SHIPPED | OUTPATIENT
Start: 2022-05-16 | End: 2022-06-28

## 2022-05-16 RX ORDER — FUROSEMIDE 20 MG/1
20 TABLET ORAL 2 TIMES DAILY
Qty: 60 TABLET | Refills: 0 | Status: SHIPPED | OUTPATIENT
Start: 2022-05-16 | End: 2022-07-19

## 2022-05-16 NOTE — TELEPHONE ENCOUNTER
Patient daughter called for refill of medication, [place] and would also like to know if medication dosage can be adjusted as PT is taking 100mg twice a day and she is running out of the medication earlier than anticipated (current script lasts 6 days). She would like a 90 day script if possible or at least a 30 day.     Lali Askew, daughter contact is needed Is;  [phone]

## 2022-05-17 ENCOUNTER — TELEPHONE (OUTPATIENT)
Dept: FAMILY MEDICINE CLINIC | Age: 62
End: 2022-05-17

## 2022-05-17 NOTE — TELEPHONE ENCOUNTER
Sho from [facility] called and is wanting to know the status of the physician orders and POC for the patient that was faxed here states they have no received this back yet   Please give them a return call with status         [phone]

## 2022-05-19 DIAGNOSIS — E11.40 CONTROLLED TYPE 2 DIABETES MELLITUS WITH DIABETIC NEUROPATHY, WITH LONG-TERM CURRENT USE OF INSULIN (HCC): ICD-10-CM

## 2022-05-19 DIAGNOSIS — F33.1 MODERATE EPISODE OF RECURRENT MAJOR DEPRESSIVE DISORDER (HCC): ICD-10-CM

## 2022-05-19 DIAGNOSIS — G62.81 CRITICAL ILLNESS POLYNEUROPATHY (HCC): ICD-10-CM

## 2022-05-19 DIAGNOSIS — Z79.4 CONTROLLED TYPE 2 DIABETES MELLITUS WITH DIABETIC NEUROPATHY, WITH LONG-TERM CURRENT USE OF INSULIN (HCC): ICD-10-CM

## 2022-05-20 RX ORDER — PREGABALIN 75 MG/1
CAPSULE ORAL
Qty: 60 CAPSULE | Refills: 0 | OUTPATIENT
Start: 2022-05-20 | End: 2022-06-19

## 2022-05-20 RX ORDER — ARIPIPRAZOLE 5 MG/1
TABLET ORAL
Qty: 30 TABLET | Refills: 10 | OUTPATIENT
Start: 2022-05-20

## 2022-05-20 NOTE — TELEPHONE ENCOUNTER
pharmacy requesting medication refill.  Please approve or deny this request.    Rx requested:  Requested Prescriptions     Pending Prescriptions Disp Refills    ARIPiprazole (ABILIFY) 5 MG tablet [Pharmacy Med Name: ARIPIPRAZOLE 5 MG TABS 5 Tablet] 30 tablet 10     Sig: TAKE 1 TABLET BY MOUTH ONCE DAILY    pregabalin (LYRICA) 75 MG capsule [Pharmacy Med Name: PREGABALIN 75 MG CAPS 75 Capsule] 60 capsule 0     Sig: TAKE 1 CAPSULE BY MOUTH TWICE DAILY    sertraline (ZOLOFT) 50 MG tablet [Pharmacy Med Name: SERTRALINE 50 MG TABS 50 Tablet] 30 tablet 10     Sig: TAKE 1 TABLET BY MOUTH DAILY         Last Office Visit:   10/22/2021      Next Visit Date:  Future Appointments   Date Time Provider Aminata Cortes   5/25/2022 10:30 AM LORETO Dunham - [facility] [address]   6/6/2022 10:30 AM Kathryn Horn MD [facility]   6/6/2022 10:45 AM Kathryn Horn MD [facility]   6/29/2022  1:15 PM Que Brooks MD [facility]   7/13/2022  2:30 PM Ryan Hayden MD [facility]

## 2022-05-25 ENCOUNTER — OFFICE VISIT (OUTPATIENT)
Dept: GASTROENTEROLOGY | Age: 62
End: 2022-05-25
Payer: MEDICARE

## 2022-05-25 VITALS
WEIGHT: 217 LBS | DIASTOLIC BLOOD PRESSURE: 66 MMHG | SYSTOLIC BLOOD PRESSURE: 124 MMHG | HEIGHT: 67 IN | BODY MASS INDEX: 34.06 KG/M2 | OXYGEN SATURATION: 98 % | HEART RATE: 61 BPM

## 2022-05-25 DIAGNOSIS — R19.7 DIARRHEA, UNSPECIFIED TYPE: Primary | ICD-10-CM

## 2022-05-25 DIAGNOSIS — R11.0 NAUSEA: ICD-10-CM

## 2022-05-25 PROCEDURE — G8427 DOCREV CUR MEDS BY ELIG CLIN: HCPCS | Performed by: NURSE PRACTITIONER

## 2022-05-25 PROCEDURE — 99213 OFFICE O/P EST LOW 20 MIN: CPT | Performed by: NURSE PRACTITIONER

## 2022-05-25 PROCEDURE — 1036F TOBACCO NON-USER: CPT | Performed by: NURSE PRACTITIONER

## 2022-05-25 PROCEDURE — 3017F COLORECTAL CA SCREEN DOC REV: CPT | Performed by: NURSE PRACTITIONER

## 2022-05-25 PROCEDURE — G8417 CALC BMI ABV UP PARAM F/U: HCPCS | Performed by: NURSE PRACTITIONER

## 2022-05-25 NOTE — PROGRESS NOTES
Gastroenterology Clinic Follow up Visit    John Garcia  [id number]  Chief Complaint   Patient presents with    Follow-up     4-6 week follow up     HPI: 57 y.o. female following up after last GI clinic on 4/22/2022. Interval change: Patient reports significant improvement in her diarrheal symptoms with taking 2 spoonfuls of Metamucil daily along with Imodium approximately 2-4 times per day. She reports having formed/brown bowel movements approximately 2 times per day. States she still will have occasional diarrhea around twice per week depending on if she eats too much fruit or drinks coffee. She reports her nausea has improved as well, states it is significantly less as she is not having as much diarrhea. She denies GERD symptoms (takes protonix 20mg daily), vomiting, hematemesis, dysphagia, abdominal pain, hematochezia, melena or weight loss. She reports intermittent chest pain after her recent CABG in January, follows w/Dr. Chong Parra regularly who recently restarted her on Ranexa. She endorses shortness of breath at baseline (wears O2). HPI from last GI clinic visit on 4/22/2022  summarized below:  57 y.o. female following up after last GI clinic on 12/24/2021. S/p recent ER visit on 4/20/2022 for generalized abdominal pain associated with nausea and diarrhea. She was found to have UTI and given Macrobid upon discharge. She was recommended to increase fiber in her diet. Stool studies at that time negative for C. difficile and GI panel negative. Patient reports her UTI symptoms are beginning to improve as she started on Macrobid yesterday. She endorses continued diarrhea that began in November 2021, was occurring 4 times per day associated with urgency and fecal incontinence. Upon review of medical record it appears she has had issues with diarrhea since 2014. States before it would happen mainly after eating, however for the past month it has been happening all the time.  States she is taking Imodium 3 times daily and still having loose stools around 3 times per day. She additionally tried Pepto-Bismol which did not help. Stool studies ordered at prior GI clinic visit have not been completed yet. She additionally endorses nausea for the past 3 to 4 months in the mornings before she eats. States it goes away after eating around half of the time. She reports recent history of CABG (2 vessels) at [facility] in January 2022. Reports she follows with Dr. Christy Cardona, cardiology, and is to see him this coming Monday. States she was taken off Brilinta after her CABG. She additionally reports history of COPD, does not require home O2 at this time, shortness of breath with exertion at baseline. She denies GERD symptoms, vomiting, hematemesis, dysphagia, abdominal pain, hematochezia, melena or weight loss. Weight fluctuates, ranging between 207-234 over the last 2 years, weight currently 214. Smoking status: denies  Alcohol use: denies  Illicit drug use: denies  NSAID use: denies      HPI from last GI clinic visit on 12/24/2021 summarized below:  59 y. o. female presents to the clinic with 1 month history of diarrhea and fecal urgency. Symptoms mainly after eating. Denies any change in health, denies any new medications, denies any abdominal pain or blood in the stool. Weight has gone up. Bhumika Banda reports having normal bowel movements prior to this.  Does not recall having episodes like this in the past however review of her medical chart reveals patient was seen approximately 7 years ago with diarrhea. Underwent colonoscopy with random colon biopsies at [facility]. Patient denies any recent antibiotic use, sick contacts, change in medications or any new medications. Patient reports having tried Pepto-Bismol and Imodium without any improvement in symptoms  Patient with multiple comorbidities, multiple medications. Diabetes with last HbA1c in August to 6.2.  Patient on insulin.   Chronic kidney disease on hemodialysis, patient receives dialysis on Tuesday Thursday and Saturday. History of hepatitis C, reports being treated and cleared the virus     Previous GI work up/Endoscopic investigations:   Colonoscopy: 1/9/2014: Normal colonic mucosa, random colon biopsies obtained, pathology report not available for review. Review of Systems   All other systems reviewed and are negative. Past medical history, past surgical history, medication list, social and familyhistory reviewed    Blood pressure 124/66, pulse 61, height 5' 7\" (1.702 m), weight 217 lb (98.4 kg), SpO2 98 %, not currently breastfeeding. Physical Exam  Constitutional:       General: She is not in acute distress. Appearance: Normal appearance. She is normal weight. She is ill-appearing (chronic). Comments: Uses a Rollator walker to ambulate   HENT:      Head: Normocephalic and atraumatic. Eyes:      General: No scleral icterus. Cardiovascular:      Rate and Rhythm: Normal rate and regular rhythm. Pulses: Normal pulses. Pulmonary:      Effort: Pulmonary effort is normal. No respiratory distress. Breath sounds: Normal breath sounds. Comments: Wearing portable O2  Abdominal:      General: Bowel sounds are normal. There is no distension. Palpations: Abdomen is soft. There is no mass. Tenderness: There is no abdominal tenderness. There is no guarding or rebound. Musculoskeletal:         General: Normal range of motion. Lymphadenopathy:      Cervical: No cervical adenopathy. Skin:     General: Skin is warm and dry. Coloration: Skin is not jaundiced. Neurological:      Mental Status: She is alert and oriented to person, place, and time. Psychiatric:         Mood and Affect: Mood normal.         Behavior: Behavior normal.         Thought Content:  Thought content normal.         Judgment: Judgment normal.       Laboratory, Pathology, Radiology reviewed in detail with relevantimportant investigations summarized below:    Lab Results   Component Value Date    WBC 8.6 04/20/2022    HGB 10.6 (L) 04/20/2022    HCT 31.4 (L) 04/20/2022    MCV 87.1 04/20/2022     04/20/2022     Lab Results   Component Value Date    ALT 22 04/20/2022    AST 26 04/20/2022    ALKPHOS 84 04/20/2022    BILITOT 0.6 04/20/2022     Component      Latest Ref Rng & Units 4/20/2022          11:54 AM   C.diff Toxin/Antigen       Negative for Clostridium difficile antigen and toxin . Bula Dayhoff .      Component      Latest Ref Rng & Units 4/20/2022          10:45 AM   Adenovirus F 40 39 PCR      Not Detected Not Detected   Astrovirus PCR      Not Detected Not Detected   Campylobacter PCR      Not Detected Not Detected   Cryptosporidium PCR      Not Detected Not Detected   Cyclospora Cayetanensis PCR      Not Detected Not Detected   Entamoeba Histolytica PCR      Not Detected Not Detected   E Coli Enteroaggregative PCR      Not Detected Not Detected   E Coli Enteropathogenic PCR      Not Detected Not Detected   E Coli Enterotoxigenic PCR      Not Detected Not Detected   Giardia Lamblia PCR      Not Detected Not Detected   Norovirus GI GII PCR      Not Detected Not Detected   Plesiomonas Shigelloides PCR      Not Detected Not Detected   Rotavirus A PCR      Not Detected Not Detected   Salmonella PCR      Not Detected Not Detected   Sapovirus PCR      Not Detected Not Detected   Shiga-like Toxin-producing E. Coli (STEC) stx1/stx2      Not Detected Not Detected   E Coli Shigella/Enteroinvasive PCR      Not Detected Not Detected   Vibrio PCR      Not Detected Not Detected   Vibrio Cholerae PCR      Not Detected Not Detected   Yersinia Enterocolitica PCR      Not Detected Not Detected      CT scan abdomen and pelvis: 10/11/2021: No acute findings     CT ABDOMEN PELVIS WO CONTRAST Additional Contrast? None  Result Date: 4/20/2022  Small to moderate bilateral pleural effusions with left lower lung subsegmental dependent atelectasis/pneumonia. Hysterectomy. Remote stable T11 compression fracture.      XR CHEST (2 VW)  Result Date: 4/20/2022  BORDERLINE CARDIOMEGALY. LEFT PLEURAL EFFUSION VERSUS THICKENING. LEFT LOWER LUNG SCARRING VERSUS ATELECTASIS/PNEUMONIA. Assessment and Plan:  Steph Brandt 57 y.o. female with history of chronic diarrhea and nausea in the morning without emesis, worsening of symptoms since November 2021. Significant improvement in symptoms with taking Metamucil and Imodium daily. ER visit on 4/20/22 for abdominal pain and diarrheal symptoms, stool studies for C. difficile and GI panel at that time negative. She has not completed her stool studies as previously ordered in December 2021.      1. Diarrhea, unspecified type  2. Nausea  -Suspect functional gastrointestinal disorder versus IBS versus polypharmacy as etiology for patient's symptoms. Discussed importance of completion of stool studies to further evaluate for inflammatory process. -Given patient's extensive comorbidities, recent CABG/cardiac/renal history, will await calprotectin and consider colonoscopy pending results. --Continue fiber (ex. Metamucil) 2 tablespoons daily, titrate as needed. --Continue Imodium 2 mg up to 4 times per day as needed    Associated medical conditions: Recent Echo 4/25/22 EF 60%, RVSP 68, CABG LAD +TV repair complicated by tamponade and pericardioal window (2 [id number]), diastolic CHF, mitral/tricuspid valve regurgitation, non-STEMI, renal failure (on hemodialysis Tuesday/Thursday/Saturday), HTN, hepatitis C, type 2 diabetes with neuropathy, sleep apnea, COPD (not on home O2), tardive dyskinesia, schizophrenia, vitamin B12 deficiency, HLD, class II obesity, chronic pain. Return if symptoms worsen or fail to improve, pending stool calprotectin.     LORETO Gandhi - CNP   Staff Gastroenterology Nurse Practitioner  [facility]    Please note this report has been partially produced using speech recognition software and contain errors related to that system including grammar, punctuation and spelling as well as words and phrases that may seem inappropriate. If there are questions or concerns please feel free to contact me to clarify.

## 2022-06-02 ENCOUNTER — HOSPITAL ENCOUNTER (INPATIENT)
Age: 62
LOS: 4 days | Discharge: HOME HEALTH | DRG: 252 | End: 2022-06-06
Attending: EMERGENCY MEDICINE | Admitting: INTERNAL MEDICINE
Payer: MEDICARE

## 2022-06-02 ENCOUNTER — APPOINTMENT (OUTPATIENT)
Dept: GENERAL RADIOLOGY | Age: 62
DRG: 252 | End: 2022-06-02
Payer: MEDICARE

## 2022-06-02 DIAGNOSIS — N18.6 ESRD (END STAGE RENAL DISEASE) (HCC): ICD-10-CM

## 2022-06-02 DIAGNOSIS — T82.898A AV FISTULA OCCLUSION, INITIAL ENCOUNTER (HCC): ICD-10-CM

## 2022-06-02 DIAGNOSIS — E87.5 HYPERKALEMIA: ICD-10-CM

## 2022-06-02 DIAGNOSIS — I50.21 ACUTE SYSTOLIC CONGESTIVE HEART FAILURE (HCC): Primary | ICD-10-CM

## 2022-06-02 LAB
ALBUMIN SERPL-MCNC: 3.9 G/DL (ref 3.5–4.6)
ALP BLD-CCNC: 115 U/L (ref 40–130)
ALT SERPL-CCNC: 12 U/L (ref 0–33)
ANION GAP SERPL CALCULATED.3IONS-SCNC: 11 MEQ/L (ref 9–15)
ANISOCYTOSIS: ABNORMAL
AST SERPL-CCNC: 16 U/L (ref 0–35)
BANDED NEUTROPHILS RELATIVE PERCENT: 3 % (ref 5–11)
BASOPHILS ABSOLUTE: 0 K/UL (ref 0–0.2)
BASOPHILS RELATIVE PERCENT: 0 %
BILIRUB SERPL-MCNC: 0.9 MG/DL (ref 0.2–0.7)
BUN BLDV-MCNC: 16 MG/DL (ref 8–23)
CALCIUM SERPL-MCNC: 9.1 MG/DL (ref 8.5–9.9)
CHLORIDE BLD-SCNC: 97 MEQ/L (ref 95–107)
CO2: 24 MEQ/L (ref 20–31)
CREAT SERPL-MCNC: 4.73 MG/DL (ref 0.5–0.9)
EOSINOPHILS ABSOLUTE: 0.2 K/UL (ref 0–0.7)
EOSINOPHILS RELATIVE PERCENT: 3 %
GFR AFRICAN AMERICAN: 11.2
GFR NON-AFRICAN AMERICAN: 9.3
GLOBULIN: 2.9 G/DL (ref 2.3–3.5)
GLUCOSE BLD-MCNC: 180 MG/DL (ref 70–99)
GLUCOSE BLD-MCNC: 264 MG/DL (ref 70–99)
GLUCOSE BLD-MCNC: 312 MG/DL (ref 70–99)
HCT VFR BLD CALC: 30.2 % (ref 37–47)
HEMOGLOBIN: 10.2 G/DL (ref 12–16)
LYMPHOCYTES ABSOLUTE: 1.1 K/UL (ref 1–4.8)
LYMPHOCYTES RELATIVE PERCENT: 14 %
MCH RBC QN AUTO: 31.3 PG (ref 27–31.3)
MCHC RBC AUTO-ENTMCNC: 33.7 % (ref 33–37)
MCV RBC AUTO: 92.8 FL (ref 82–100)
MONOCYTES ABSOLUTE: 0.5 K/UL (ref 0.2–0.8)
MONOCYTES RELATIVE PERCENT: 7 %
NEUTROPHILS ABSOLUTE: 5.8 K/UL (ref 1.4–6.5)
NEUTROPHILS RELATIVE PERCENT: 73 %
PDW BLD-RTO: 17.7 % (ref 11.5–14.5)
PERFORMED ON: ABNORMAL
PERFORMED ON: ABNORMAL
PLATELET # BLD: 139 K/UL (ref 130–400)
PLATELET SLIDE REVIEW: ADEQUATE
POTASSIUM SERPL-SCNC: 6.3 MEQ/L (ref 3.4–4.9)
RBC # BLD: 3.25 M/UL (ref 4.2–5.4)
SODIUM BLD-SCNC: 132 MEQ/L (ref 135–144)
TOTAL PROTEIN: 6.8 G/DL (ref 6.3–8)
WBC # BLD: 7.6 K/UL (ref 4.8–10.8)

## 2022-06-02 PROCEDURE — 6370000000 HC RX 637 (ALT 250 FOR IP): Performed by: INTERNAL MEDICINE

## 2022-06-02 PROCEDURE — 93005 ELECTROCARDIOGRAM TRACING: CPT | Performed by: EMERGENCY MEDICINE

## 2022-06-02 PROCEDURE — 1210000000 HC MED SURG R&B

## 2022-06-02 PROCEDURE — 6360000002 HC RX W HCPCS: Performed by: INTERNAL MEDICINE

## 2022-06-02 PROCEDURE — 6370000000 HC RX 637 (ALT 250 FOR IP): Performed by: EMERGENCY MEDICINE

## 2022-06-02 PROCEDURE — 80053 COMPREHEN METABOLIC PANEL: CPT

## 2022-06-02 PROCEDURE — 2500000003 HC RX 250 WO HCPCS: Performed by: INTERNAL MEDICINE

## 2022-06-02 PROCEDURE — 36415 COLL VENOUS BLD VENIPUNCTURE: CPT

## 2022-06-02 PROCEDURE — 94640 AIRWAY INHALATION TREATMENT: CPT

## 2022-06-02 PROCEDURE — 71045 X-RAY EXAM CHEST 1 VIEW: CPT

## 2022-06-02 PROCEDURE — 85025 COMPLETE CBC W/AUTO DIFF WBC: CPT

## 2022-06-02 PROCEDURE — 99285 EMERGENCY DEPT VISIT HI MDM: CPT

## 2022-06-02 RX ORDER — PANTOPRAZOLE SODIUM 20 MG/1
20 TABLET, DELAYED RELEASE ORAL DAILY
Status: DISCONTINUED | OUTPATIENT
Start: 2022-06-03 | End: 2022-06-06

## 2022-06-02 RX ORDER — ZOLPIDEM TARTRATE 5 MG/1
5 TABLET ORAL NIGHTLY PRN
Status: DISCONTINUED | OUTPATIENT
Start: 2022-06-02 | End: 2022-06-06 | Stop reason: HOSPADM

## 2022-06-02 RX ORDER — ONDANSETRON 4 MG/1
4 TABLET, FILM COATED ORAL EVERY 8 HOURS PRN
Status: DISCONTINUED | OUTPATIENT
Start: 2022-06-02 | End: 2022-06-06 | Stop reason: HOSPADM

## 2022-06-02 RX ORDER — CHOLECALCIFEROL (VITAMIN D3) 10 MCG
1 TABLET ORAL DAILY
Status: DISCONTINUED | OUTPATIENT
Start: 2022-06-02 | End: 2022-06-06 | Stop reason: HOSPADM

## 2022-06-02 RX ORDER — ALBUTEROL SULFATE 2.5 MG/3ML
2.5 SOLUTION RESPIRATORY (INHALATION) EVERY 4 HOURS PRN
Status: DISCONTINUED | OUTPATIENT
Start: 2022-06-02 | End: 2022-06-06 | Stop reason: HOSPADM

## 2022-06-02 RX ORDER — ARIPIPRAZOLE 5 MG/1
5 TABLET ORAL DAILY
Status: DISCONTINUED | OUTPATIENT
Start: 2022-06-03 | End: 2022-06-06 | Stop reason: HOSPADM

## 2022-06-02 RX ORDER — HYDROXYZINE HYDROCHLORIDE 25 MG/1
25 TABLET, FILM COATED ORAL 3 TIMES DAILY PRN
Status: DISCONTINUED | OUTPATIENT
Start: 2022-06-02 | End: 2022-06-06 | Stop reason: HOSPADM

## 2022-06-02 RX ORDER — INSULIN LISPRO 100 [IU]/ML
0-6 INJECTION, SOLUTION INTRAVENOUS; SUBCUTANEOUS NIGHTLY
Status: DISCONTINUED | OUTPATIENT
Start: 2022-06-02 | End: 2022-06-06 | Stop reason: HOSPADM

## 2022-06-02 RX ORDER — ALBUTEROL SULFATE 2.5 MG/3ML
2.5 SOLUTION RESPIRATORY (INHALATION) EVERY 6 HOURS PRN
Status: DISCONTINUED | OUTPATIENT
Start: 2022-06-02 | End: 2022-06-06 | Stop reason: HOSPADM

## 2022-06-02 RX ORDER — DOCUSATE SODIUM 100 MG/1
100 CAPSULE, LIQUID FILLED ORAL 2 TIMES DAILY PRN
Status: DISCONTINUED | OUTPATIENT
Start: 2022-06-02 | End: 2022-06-06 | Stop reason: HOSPADM

## 2022-06-02 RX ORDER — ATORVASTATIN CALCIUM 40 MG/1
40 TABLET, FILM COATED ORAL NIGHTLY
Status: DISCONTINUED | OUTPATIENT
Start: 2022-06-02 | End: 2022-06-06 | Stop reason: HOSPADM

## 2022-06-02 RX ORDER — INSULIN LISPRO 100 [IU]/ML
0-12 INJECTION, SOLUTION INTRAVENOUS; SUBCUTANEOUS
Status: DISCONTINUED | OUTPATIENT
Start: 2022-06-02 | End: 2022-06-06 | Stop reason: HOSPADM

## 2022-06-02 RX ORDER — ACETAMINOPHEN 325 MG/1
650 TABLET ORAL EVERY 4 HOURS PRN
Status: DISCONTINUED | OUTPATIENT
Start: 2022-06-02 | End: 2022-06-02 | Stop reason: SDUPTHER

## 2022-06-02 RX ORDER — INSULIN GLARGINE 100 [IU]/ML
30 INJECTION, SOLUTION SUBCUTANEOUS NIGHTLY
Status: DISCONTINUED | OUTPATIENT
Start: 2022-06-02 | End: 2022-06-06 | Stop reason: HOSPADM

## 2022-06-02 RX ORDER — HYDROXYZINE HYDROCHLORIDE 10 MG/1
10 TABLET, FILM COATED ORAL 3 TIMES DAILY PRN
Status: DISCONTINUED | OUTPATIENT
Start: 2022-06-02 | End: 2022-06-02 | Stop reason: DRUGHIGH

## 2022-06-02 RX ORDER — ASPIRIN 81 MG/1
81 TABLET, CHEWABLE ORAL DAILY
Status: DISCONTINUED | OUTPATIENT
Start: 2022-06-02 | End: 2022-06-06 | Stop reason: HOSPADM

## 2022-06-02 RX ORDER — ONDANSETRON 2 MG/ML
4 INJECTION INTRAMUSCULAR; INTRAVENOUS EVERY 6 HOURS PRN
Status: DISCONTINUED | OUTPATIENT
Start: 2022-06-02 | End: 2022-06-06 | Stop reason: HOSPADM

## 2022-06-02 RX ORDER — FUROSEMIDE 20 MG/1
TABLET ORAL
Qty: 60 TABLET | Refills: 10 | OUTPATIENT
Start: 2022-06-02

## 2022-06-02 RX ORDER — ACETAMINOPHEN 325 MG/1
650 TABLET ORAL EVERY 4 HOURS PRN
Status: DISCONTINUED | OUTPATIENT
Start: 2022-06-02 | End: 2022-06-06 | Stop reason: HOSPADM

## 2022-06-02 RX ORDER — MIDODRINE HYDROCHLORIDE 5 MG/1
5 TABLET ORAL
Status: DISCONTINUED | OUTPATIENT
Start: 2022-06-06 | End: 2022-06-06 | Stop reason: HOSPADM

## 2022-06-02 RX ORDER — MIDODRINE HYDROCHLORIDE 5 MG/1
5 TABLET ORAL
Status: DISCONTINUED | OUTPATIENT
Start: 2022-06-02 | End: 2022-06-02

## 2022-06-02 RX ORDER — SODIUM POLYSTYRENE SULFONATE 15 G/60ML
30 SUSPENSION ORAL; RECTAL ONCE
Status: COMPLETED | OUTPATIENT
Start: 2022-06-02 | End: 2022-06-02

## 2022-06-02 RX ADMIN — INSULIN LISPRO 1 UNITS: 100 INJECTION, SOLUTION INTRAVENOUS; SUBCUTANEOUS at 21:32

## 2022-06-02 RX ADMIN — SERTRALINE HYDROCHLORIDE 50 MG: 50 TABLET ORAL at 17:07

## 2022-06-02 RX ADMIN — INSULIN LISPRO 6 UNITS: 100 INJECTION, SOLUTION INTRAVENOUS; SUBCUTANEOUS at 18:41

## 2022-06-02 RX ADMIN — SODIUM ZIRCONIUM CYCLOSILICATE 10 G: 10 POWDER, FOR SUSPENSION ORAL at 17:07

## 2022-06-02 RX ADMIN — ATORVASTATIN CALCIUM 40 MG: 40 TABLET, FILM COATED ORAL at 20:57

## 2022-06-02 RX ADMIN — SODIUM POLYSTYRENE SULFONATE 30 G: 15 SUSPENSION ORAL; RECTAL at 17:07

## 2022-06-02 RX ADMIN — FUROSEMIDE 100 MG: 80 TABLET ORAL at 20:57

## 2022-06-02 RX ADMIN — SODIUM ZIRCONIUM CYCLOSILICATE 10 G: 10 POWDER, FOR SUSPENSION ORAL at 20:57

## 2022-06-02 RX ADMIN — NEPHROCAP 1 MG: 1 CAP ORAL at 17:22

## 2022-06-02 RX ADMIN — MIDODRINE HYDROCHLORIDE 5 MG: 5 TABLET ORAL at 17:07

## 2022-06-02 RX ADMIN — ASPIRIN 81 MG 81 MG: 81 TABLET ORAL at 17:07

## 2022-06-02 RX ADMIN — HYDROXYZINE HYDROCHLORIDE 10 MG: 10 TABLET ORAL at 17:07

## 2022-06-02 RX ADMIN — ALBUTEROL SULFATE 2.5 MG: 2.5 SOLUTION RESPIRATORY (INHALATION) at 19:30

## 2022-06-02 RX ADMIN — METOPROLOL TARTRATE 25 MG: 25 TABLET, FILM COATED ORAL at 17:07

## 2022-06-02 RX ADMIN — MICONAZOLE NITRATE: 2 POWDER TOPICAL at 23:18

## 2022-06-02 ASSESSMENT — ENCOUNTER SYMPTOMS
BACK PAIN: 0
DIARRHEA: 0
SHORTNESS OF BREATH: 1
ABDOMINAL PAIN: 0
NAUSEA: 0
COUGH: 0
VOMITING: 0
SORE THROAT: 0

## 2022-06-02 ASSESSMENT — PAIN - FUNCTIONAL ASSESSMENT: PAIN_FUNCTIONAL_ASSESSMENT: NONE - DENIES PAIN

## 2022-06-02 NOTE — TELEPHONE ENCOUNTER
Pharmacy requesting medication refill. Please approve or deny this request.  Contacted patient to see if she is still having swelling in legs & patient stated that she did.      Rx requested:  Requested Prescriptions     Pending Prescriptions Disp Refills    furosemide (LASIX) 20 MG tablet [Pharmacy Med Name: FUROSEMIDE 20 MG TABLET 20 Tablet] 60 tablet 10     Sig: TAKE ONE (1) TABLET BY MOUTH TWICE DAILY         Last Office Visit:   10/22/2021      Next Visit Date:  Future Appointments   Date Time Provider Aminata Cortes   6/6/2022 10:30 AM Elliott Cortes MD [facility]   6/6/2022 10:45 AM Elliott Cortes MD [facility]   6/29/2022  1:15 PM Alberto Camp MD [address] [place] [address]   7/13/2022  2:30 PM Wale Krause MD [facility]
Thank you for checking on patient prior to submitting this request.  It is very helpful. I prescribed 30 days' of medication on 05/16. She should not need refills. Also, please make sure she has a follow-up very soon. She had to leave without being seen at recent appt.
yes

## 2022-06-02 NOTE — ED NOTES
Transport here to take patient to [facility]. Patient stable at time of transport. VSS. Tele monitor connected to patient. Belongings taken to with patient on cart.       Kalyn Lin RN  06/02/22 [id number]

## 2022-06-02 NOTE — ED PROVIDER NOTES
[address] [facility] ED  eMERGENCYdEPARTMENT eNCOUnter      Pt Name: Jens Aldana  MRN: [id number]  [place] 1960  Date of evaluation: 6/2/2022  Randal Boykin MD    CHIEF COMPLAINT           HPI  Jens Aldana is a 57 y.o. female per chart review has a h/o ESRD on dialysis TTS, CHF, HTN, Hpl, GERD, schizophrenia, DM II presents to the ED with sob. Pt notes she went to dialysis today but her fistula could not be used cause it was clotted. Pt was sent here by her nephrologist.  Pt notes gradual onset, mild, constant, sob x 3 days. Pt denies fever, n/v, cp, ab pain, dysuria, diarrhea. ROS  Review of Systems   Constitutional: Negative for activity change, chills and fever. HENT: Negative for ear pain and sore throat. Eyes: Negative for visual disturbance. Respiratory: Positive for shortness of breath. Negative for cough. Cardiovascular: Negative for chest pain, palpitations and leg swelling. Gastrointestinal: Negative for abdominal pain, diarrhea, nausea and vomiting. Genitourinary: Negative for dysuria. Musculoskeletal: Negative for back pain. Skin: Negative for rash. Neurological: Negative for dizziness and weakness. Except as noted above the remainder of the review of systems was reviewed and negative.        PAST MEDICAL HISTORY     Past Medical History:   Diagnosis Date    Angina at rest [facility]) 5/24/2020    Anxiety     CAD S/P percutaneous coronary angioplasty 2015, 2018    stents per dr Connie Tate    CHF (congestive heart failure) ([address]     CKD (chronic kidney disease) stage 4, GFR 15-29 ml/min ([address] 2/24/2018    CKD stage 4 due to type 2 diabetes mellitus ([address]     Contusion of right chest wall 2/16/2021    COPD (chronic obstructive pulmonary disease) ([address]     Diabetic nephropathy with proteinuria ([address] 2014    DJD (degenerative joint disease) of knee     Dr Tigist Mendoza GERD (gastroesophageal reflux disease)     Hemiparesis, left [facility]) 2013    [facility] Assisted Living ([facility])    Hemodialysis patient [facility])     Hemodialysis-associated hypotension 10/22/2021    History of heart failure     History of seizures     History of type C viral hepatitis     HTN (hypertension)     Hyperlipidemia     Impaired mobility and activities of daily living     Mediastinal lymphadenopathy     Brandie Davila    Metabolic syndrome     Moderate persistent asthma without complication     Need for extended care facility 2021    Neurogenic urinary incontinence 2013    Neuropathy in diabetes [facility])     Nonrheumatic mitral valve regurgitation 2021    Nonrheumatic tricuspid valve regurgitation 2021    Obesity (BMI 30-39. 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 Cleve Carrasquillo      x1 Dr. Zeus Mckeon, Dr Geo West 2018   Jeremiah Punch  08/10/2021    [facility]    DIAGNOSTIC CARDIAC CATH LAB PROCEDURE  10/02/2019    HYSTERECTOMY, TOTAL ABDOMINAL      one ovary intact, Dr Vinh Cardoza, menorrhagia    WA TOTAL KNEE ARTHROPLASTY Left 2018    LEFT KNEE TOTAL KNEE ARTHROPLASTY, SHAYNA, NERVE BLOCK performed by Corie Olivier MD at [facility]    PTCA      TOE AMPUTATION Right     TOTAL KNEE ARTHROPLASTY  05/19/16    Dr John Nicholas TUNNELED [address] Right 07/01/2020    tunneled HD catheter per Dr Haydee Nichols       Previous Medications    ACETAMINOPHEN (TYLENOL) 325 MG TABLET    Take 2 tablets by mouth every 4 hours as needed for Pain (For mild to moderate pain (Pain 1-6 out of 10 on pain scale))    ALBUTEROL (PROVENTIL) (2.5 MG/3ML) 0.083% NEBULIZER SOLUTION    Take 3 mLs by nebulization every 4 hours as needed for Wheezing    ALCOHOL SWABS (EASY TOUCH ALCOHOL PREP MEDIUM) 70 % PADS    USE AS DIRECTED THREE TIMES A DAY    ARIPIPRAZOLE (ABILIFY) 5 MG TABLET    TAKE 1 TABLET BY MOUTH ONCE DAILY    ASPIRIN EC 81 MG EC TABLET    Take 1 tablet by mouth daily    ATORVASTATIN (LIPITOR) 40 MG TABLET    TAKE 1 TABLET BY MOUTH DAILY    B COMPLEX-C-FOLIC ACID (NEPHROCAPS) 1 MG CAPSULE    Take 1 capsule by mouth daily    BLOOD GLUCOSE MONITORING SUPPL (FREESTYLE LITE) CORETTA    1 Device by Does not apply route daily as needed (Diabetes) Use freestyle meter to test blood sugar as needed    BLOOD GLUCOSE MONITORING SUPPL (ONETOUCH VERIO) W/DEVICE KIT    AS DIRECTED    BLOOD GLUCOSE TEST STRIPS (FREESTYLE LITE) STRIP    1 each by Does not apply route 4 times daily (before meals and nightly) As needed. BLOOD GLUCOSE TEST STRIPS (ONETOUCH VERIO) STRIP    QID    DOCUSATE SODIUM (COLACE, DULCOLAX) 100 MG CAPS    Take 100 mg by mouth 2 times daily For the prevention and treatment of constipation while taking pain medications. FLUTICASONE (FLONASE) 50 MCG/ACT NASAL SPRAY    1 spray by Each Nostril route daily    FREESTYLE LANCETS MISC    Test 4x daily    FUROSEMIDE (LASIX) 20 MG TABLET    Take 1 tablet by mouth 2 times daily    HANDICAP PLACARD MISC    by Does not apply route Expiration in 5 years.     HYDROXYZINE (ATARAX) 25 MG TABLET    TAKE ONE TABLET BY MOUTH TWO TIMES A DAY    INSULIN ASPART (NOVOLOG FLEXPEN) 100 UNIT/ML INJECTION PEN    INJECT 8-10  units with each meals    INSULIN ASPART (NOVOLOG FLEXPEN) 100 UNIT/ML INJECTION PEN    15 UNITS PLUS SLIDING SCALE   LESS THAN 180 NONE  181-250 2 UNITS  251-300 4 UNITS  301-400 6 UNITS   401-500 10 UNITS    INSULIN GLARGINE (LANTUS SOLOSTAR) 100 UNIT/ML INJECTION PEN    70 UNITS AT BEDTIME    INSULIN PEN NEEDLE (SURE COMFORT PEN NEEDLES) 30G X 8 MM MISC    USE AS DIRECTED FIVE TIMES A DAILY    ISOSORBIDE MONONITRATE (IMDUR) 30 MG EXTENDED RELEASE TABLET    Take 4 tablets by mouth daily    LANTUS SOLOSTAR 100 UNIT/ML INJECTION PEN    55 units at bedtime    LIDOCAINE-PRILOCAINE (EMLA) 2.5-2.5 % CREAM    Apply topically as needed for Pain Apply topically as needed. 3 times a week before dialysis wrap with saran wrap    LOPERAMIDE (RA ANTI-DIARRHEAL) 2 MG CAPSULE    Take 1 capsule by mouth 4 times daily as needed for Diarrhea    MAGNESIUM OXIDE (MAG-OX) 400 MG TABLET    TAKE 1 TABLET BY MOUTH DAILY    MELATONIN 10 MG CAPS CAPSULE    Take 1 capsule by mouth nightly    METOPROLOL TARTRATE (LOPRESSOR) 25 MG TABLET    TAKE 1/2 TABLET BY MOUTH TWO TIMES DAILY     MIDODRINE (PROAMATINE) 5 MG TABLET    Take 1 tablet by mouth one time a day every Tue, Thu, Sat for hypotension. Give 30 mins prior to dialysis.     NITROGLYCERIN (NITROSTAT) 0.4 MG SL TABLET    Place 1 tablet under the tongue every 5 minutes as needed for Chest pain    NYAMYC [id number] UNIT/GM POWDER    APPLY TO AFFECTED AREA OF ABDOMINAL FOLDS EVERY 12 HOURS AS NEEDED    ONDANSETRON (ZOFRAN) 4 MG TABLET    Take 1 tablet by mouth as needed for nausea/vomiting 3 times a day as needed    ONETOUCH DELICA LANCETS 47A MISC    QID    PANTOPRAZOLE (PROTONIX) 20 MG TABLET    TAKE 1 TABLET BY MOUTH DAILY    POTASSIUM CHLORIDE (KLOR-CON M) 20 MEQ EXTENDED RELEASE TABLET    Take 2 tablets by mouth 2 times daily (with meals)    PREGABALIN (LYRICA) 75 MG CAPSULE    TAKE 1 CAPSULE BY MOUTH TWICE DAILY    PSYLLIUM (METAMUCIL SMOOTH TEXTURE) 58.6 % POWDER    Take 2 teaspoon with 8 to 10 oz water. SERTRALINE (ZOLOFT) 50 MG TABLET    TAKE 1 TABLET BY MOUTH DAILY    SITAGLIPTIN (JANUVIA) 50 MG TABLET    Take 50 mg by mouth daily    TRIAMCINOLONE (KENALOG) 0.1 % CREAM    Apply topically daily Apply topically 2 times daily. VITAMIN B-12 (CYANOCOBALAMIN) 100 MCG TABLET    TAKE 1 TABLET BY MOUTH ONCE DAILY       ALLERGIES     Codeine and Oxycontin [oxycodone hcl]    FAMILY HISTORY       Family History   Problem Relation Age of Onset   Vidya Hannah Cancer Mother 76        survived   Vidya Hannah Breast Cancer Mother     Hypertension Father     Diabetes Sister     Mental Illness Sister     Colon Cancer Neg Hx           SOCIAL HISTORY       Social History     Socioeconomic History    Marital status:      Spouse name: None    Number of children: 2    Years of education: None    Highest education level: None   Occupational History    Occupation: disabled   Tobacco Use    Smoking status: Never Smoker    Smokeless tobacco: Never Used   Vaping Use    Vaping Use: Never used   Substance and Sexual Activity    Alcohol use: No     Alcohol/week: 0.0 standard drinks    Drug use: No    Sexual activity: Not Currently   Other Topics Concern    None   Social History Narrative    Born in [place], one of 5    Twin sister Reyes, very ill in 2018, [place] 2019    Moved to [facility], , 2 children, one son and one daughter    Worked at [email or web address], as a nurse's aide    Disabled due to mental illness    Lived at [facility], was discharged, returned to independent living in 2017 in the daughter's house and has adjusted well    One son and one daughter, live in the same house with patient, Jacoby Holley pays the rent    [employer] reading (misteries)        10/11/2021 [facility] updates; patient lives with her daughter son-in-law and 2 grandchildren and patient's handicapped son. Per daughter, her brother is blind, MRDD, multiple health issues.   Daughter's  is patient's legal guardian. Patient has hemodialysis Tuesday Thursday and Saturday. Patient's bedroom is on main floor with a half bath. Daughter walks patient upstairs once weekly for full bath. Patient is using her walker in the home. Patient has a hospital bed in the home. Social Determinants of Health     Financial Resource Strain: Low Risk     Difficulty of Paying Living Expenses: Not hard at all   Food Insecurity: No Food Insecurity    Worried About Running Out of Food in the Last Year: Never true    Yung of Food in the Last Year: Never true   Transportation Needs: No Transportation Needs    Lack of Transportation (Medical): No    Lack of Transportation (Non-Medical): No   Physical Activity: Sufficiently Active    Days of Exercise per Week: 3 days    Minutes of Exercise per Session: 60 min   Stress:     Feeling of Stress : Not on file   Social Connections:     Frequency of Communication with Friends and Family: Not on file    Frequency of Social Gatherings with Friends and Family: Not on file    Attends Mandaen Services: Not on file    Active Member of [address] [place] [address] or Organizations: Not on file    Attends Club or Organization Meetings: Not on file    Marital Status: Not on file   Intimate Partner Violence:     Fear of Current or Ex-Partner: Not on file    Emotionally Abused: Not on file    Physically Abused: Not on file    Sexually Abused: Not on file   Housing Stability:     Unable to Pay for Housing in the Last Year: Not on file    Number of [place] in the Last Year: Not on file    Unstable Housing in the Last Year: Not on file         PHYSICAL EXAM       ED Triage Vitals   BP Temp Temp Source Heart Rate Resp SpO2 Height Weight   06/02/22 1506 06/02/22 1503 06/02/22 1503 06/02/22 1503 06/02/22 1503 06/02/22 1503 06/02/22 1503 06/02/22 1503   96/61 98.5 °F (36.9 °C) Oral 65 19 94 % 5' 6\" (1.676 m) 217 lb (98.4 kg)       Physical Exam  Vitals and nursing note reviewed. Constitutional:       Appearance: She is well-developed. HENT:      Head: Normocephalic. Right Ear: External ear normal.      Left Ear: External ear normal.   Eyes:      Conjunctiva/sclera: Conjunctivae normal.      Pupils: Pupils are equal, round, and reactive to light. Cardiovascular:      Rate and Rhythm: Normal rate and regular rhythm. Heart sounds: Normal heart sounds. Pulmonary:      Effort: Pulmonary effort is normal.      Breath sounds: Normal breath sounds. Abdominal:      General: Bowel sounds are normal. There is no distension. Palpations: Abdomen is soft. Tenderness: There is no abdominal tenderness. Musculoskeletal:         General: Normal range of motion. Cervical back: Normal range of motion and neck supple. Skin:     General: Skin is warm and dry. Neurological:      Mental Status: She is alert and oriented to person, place, and time. Psychiatric:         Mood and Affect: Mood normal.           MDM  58 yo female presents to the ED with weakness, need for vascular access for dialysis. Pt is afebrile, hemodynamically stable. EKG shows NSR with HR 62, RBBB, LAFB, normal axis, normal intervals, no ST changes. Labs remarkable for K 6.3, glucose 312, Cr 4.71, Hb 10.2. CXR shows CHF. Case discussed with Dr. Devon Brand who recommended fistulagram to de-clot patient's fistula. However, this procedure is not able to be done tomorrow. CXR negative. Case discussed with Dr. Dianne Herndon (nephrology) who recommended admission. Pt given PO kayexalate for hyperkalemia. Due to sob, hyperkalemia, need for dialysis and fluid overload, AV fistula occlusion, pt admitted to medicine in stable condition. FINAL IMPRESSION      1. Acute systolic congestive heart failure ([address]    2. Hyperkalemia    3. ESRD (end stage renal disease) ([address]    4.  AV fistula occlusion, initial encounter [facility])          DISPOSITION/PLAN   DISPOSITION Decision To Admit 06/02/2022 04:42:21 PM        DISCHARGE MEDICATIONS:  [unfilled]         Bill Wolff MD(electronically signed)  Attending Emergency Physician           Bill Wolff MD  06/02/22 [id number]

## 2022-06-02 NOTE — ED NOTES
Patient resting in bed with call light in reach. Breathes are even and unlabored. Skin is warm and dry. Vital signs are stable. Patient remains on bedside monitor. Patient denies any needs at this time.         Nelson Matias RN  06/02/22 2383

## 2022-06-02 NOTE — PROGRESS NOTES
Patient assessed and charted on by this RN. VSS. A&Ox4. Reviewed home medications with daughter Glenn Walker and gave update on patient. Dr. Tod Martinez notified of home medications. Patient only takes midodrine on days of dialysis and not three times a day. Also patient has not had lasix at home for about 2 weeks due to pharmacy issues and was still continuing to take potassium. Fall precautions are in place. Limb precautions on right arm. Will continue to monitor.

## 2022-06-02 NOTE — H&P
Nephrology Progress Note    Assessment:  Access clotted  ESRDX  OHDX CAD  S/p CABGX  Anemia  Hx CVA  DM type-2 with neuropathy      Plan: Dr Lupe Byrd to declot graft morning    Patient Active Problem List:     Atherosclerotic heart disease of native coronary artery with unspecified angina pectoris (HCC)     Schizophrenia, paranoid, chronic     Metabolic syndrome     Vitamin B 12 deficiency     Cerebral microvascular disease     Mixed hyperlipidemia     Other hammer toe (acquired)     Vitamin D insufficiency     Incontinence of urine     Diabetic nephropathy with proteinuria ([address]     Essential (primary) hypertension     History of type C viral hepatitis     Urinary incontinence due to cognitive impairment     History of seizures     Stented coronary artery-plan is to stay on Plavix indefinately per Dr Barber Echols     Other specified diabetes mellitus with diabetic neuropathy, unspecified ([address]     Controlled type 2 diabetes mellitus with diabetic neuropathy, with long-term current use of insulin (HCC)     Hemiparesis, left (HCC)     Angina, class II (HCC)     Pain, unspecified     Tardive dyskinesia     Shortness of breath     Chronic diastolic congestive heart failure (HCC)     Sleep apnea, unspecified     Pulmonary hypertension, unspecified (HCC)     Class 2 severe obesity with serious comorbidity and body mass index (BMI) of 36.0 to 36.9 in adult [facility])     Edema     Closed supracondylar fracture of right humerus     Other chronic pain     Palliative care patient     Recurrent falls     Renal failure     Difficulty in walking     ESRD (end stage renal disease) on dialysis ([address]     Weakness     Other seizures (HCC)     Moderate persistent asthma without complication     [id number]     Post PTCA     Falls frequently     Chest wall contusion, left, initial encounter     Headache, unspecified     Paranoid schizophrenia ([address]     Compression fracture of spine (HCC)     Closed rib fracture     Depression     Chronic obstructive pulmonary disease ([facility]     Critical illness polyneuropathy ([facility]     Multiple closed fractures of ribs of right side     Nonrheumatic mitral valve regurgitation     Nonrheumatic tricuspid valve regurgitation     Need for extended care facility     Chronic pain of both shoulders     Hemodialysis-associated hypotension     Anginal chest pain at rest [facility])     Chest pain     Unstable angina (HCC)     NSTEMI (non-ST elevated myocardial infarction) ([facility])     CKD (chronic kidney disease) stage 4, GFR 15-29 ml/min ([facility])      Subjective:  Admit Date: 6/2/2022    Interval History: no issues    Medications:  Scheduled Meds:  Continuous Infusions:    CBC: Recent Labs     06/02/22  1515   WBC 7.6   HGB 10.2*        CMP:    Recent Labs     06/02/22  1515   *   K 6.3*   CL 97   CO2 24   BUN 16   CREATININE 4.73*   GLUCOSE 312*   CALCIUM 9.1   LABGLOM 9.3*     Troponin: No results for input(s): TROPONINI in the last 72 hours. BNP: No results for input(s): BNP in the last 72 hours. INR: No results for input(s): INR in the last 72 hours. Lipids: No results for input(s): CHOL, LDLDIRECT, TRIG, HDL, AMYLASE, LIPASE in the last 72 hours. Liver:   Recent Labs     06/02/22  1515   AST 16   ALT 12   ALKPHOS 115   PROT 6.8   LABALBU 3.9   BILITOT 0.9*     Iron:  No results for input(s): IRONS, FERRITIN in the last 72 hours. Invalid input(s): LABIRONS  Urinalysis: No results for input(s): UA in the last 72 hours.     Objective:  Vitals: BP 96/61   Pulse 65   Temp 98.5 °F (36.9 °C) (Oral)   Resp 19   Ht 5' 6\" (1.676 m)   Wt 217 lb (98.4 kg)   LMP  (LMP Unknown)   SpO2 94%   BMI 35.02 kg/m²    Wt Readings from Last 3 Encounters:   06/02/22 217 lb (98.4 kg)   05/25/22 217 lb (98.4 kg)   04/25/22 221 lb (100.2 kg)      24HR INTAKE/OUTPUT:  No intake or output data in the 24 hours ending 06/02/22 1624    General: alert, in no apparent distress  HEENT: normocephalic, atraumatic, anicteric  Neck: supple, no mass  Lungs: non-labored respirations, clear to auscultation bilaterally  Heart: regular rate and rhythm, no murmurs or rubs  Abdomen: soft, non-tender, non-distended  Ext: no cyanosis, no peripheral edema  Neuro: alert and oriented, no gross abnormalities  Psych: normal mood and affect  Skin: no rash      Electronically signed by Kvng Vera DO, MD

## 2022-06-02 NOTE — PROGRESS NOTES
[facility] Respiratory Therapy Evaluation   Current Order:  Aero albuterol Q6prn  Home Regimen: SAME   Ordering Physician: Jgevaluation Date: DONE   Diagnosis: HYPERKALEMIA     Patient Status: Stable / Unstable + Physician notified    The following MDI Criteria must be met in order to convert aerosol to MDI with spacer. If unable to meet, MDI will be converted to aerosol:  []  Patient able to demonstrate the ability to use MDI effectively  []  Patient alert and cooperative  []  Patient able to take deep breath with 5-10 second hold  []  Medication(s) available in this delivery method   []  Peak flow greater than or equal to 200 ml/min            Current Order Substituted To  (same drug, same frequency)   Aerosol to MDI [] Albuterol Sulfate 0.083% unit dose by aerosol Albuterol Sulfate MDI 2 puffs by inhalation with spacer    [] Levalbuterol 1.25 mg unit dose by aerosol Levalbuterol MDI 2 puffs by inhalation with spacer    [] Levalbuterol 0.63 mg unit dose by aerosol Levalbuterol MDI 2 puffs by inhalation with spacer    [] Ipratropium Bromide 0.02% unit dose by aerosol Ipratropium Bromide MDI 2 puffs by inhalation with spacer    [] Duoneb (Ipratropium + Albuterol) unit dose by aerosol Ipratropium MDI + Albuterol MDI 2 puffs by inhalation w/spacer   MDI to Aerosol [] Albuterol Sulfate MDI Albuterol Sulfate 0.083% unit dose by aerosol    [] Levalbuterol MDI 2 puffs by inhalation Levalbuterol 1.25 mg unit dose by aerosol    [] Ipratropium Bromide MDI by inhalation Ipratropium Bromide 0.02% unit dose by aerosol    [] Combivent (Ipratropium + Albuterol) MDI by inhalation Duoneb (Ipratropium + Albuterol) unit dose by aerosol       Treatment Assessment [Frequency/Schedule]:  Change frequency to: ________NO CHANGE__________________________________________per Protocol, P&T, MEC      Points 0 1 2 3 4   Pulmonary Status  Non-Smoker  [x]   Smoking history   < 20 pack years  []   Smoking history  ?  20 pack years  []

## 2022-06-02 NOTE — ED TRIAGE NOTES
Pt presents to ED with c/o vascular access issue. Pt reports that she was sent by Dr. Tom Santizo to have vas cath placed d/t non-functioning fistula. Pt was unable to have dialysis today d/t blocked fistula. Dialysis schedule Tuesday, Thursday, Saturday. Last dialysis Tuesday. Upon assessment, pt is A/Ox4, skin p/w/d, resp even and unlabored, msp's intact. Pt denies cp, sob, n/v/d, fever, and chills.

## 2022-06-03 ENCOUNTER — APPOINTMENT (OUTPATIENT)
Dept: INTERVENTIONAL RADIOLOGY/VASCULAR | Age: 62
DRG: 252 | End: 2022-06-03
Payer: MEDICARE

## 2022-06-03 ENCOUNTER — HOSPITAL ENCOUNTER (INPATIENT)
Dept: INTERVENTIONAL RADIOLOGY/VASCULAR | Age: 62
Discharge: HOME | DRG: 252 | End: 2022-06-05
Payer: MEDICARE

## 2022-06-03 VITALS
SYSTOLIC BLOOD PRESSURE: 160 MMHG | OXYGEN SATURATION: 93 % | RESPIRATION RATE: 20 BRPM | HEART RATE: 65 BPM | DIASTOLIC BLOOD PRESSURE: 70 MMHG

## 2022-06-03 LAB
ALBUMIN SERPL-MCNC: 3.8 G/DL (ref 3.5–4.6)
ALP BLD-CCNC: 113 U/L (ref 40–130)
ALT SERPL-CCNC: 12 U/L (ref 0–33)
ANION GAP SERPL CALCULATED.3IONS-SCNC: 12 MEQ/L (ref 9–15)
AST SERPL-CCNC: 16 U/L (ref 0–35)
BILIRUB SERPL-MCNC: 1.2 MG/DL (ref 0.2–0.7)
BUN BLDV-MCNC: 19 MG/DL (ref 8–23)
CALCIUM SERPL-MCNC: 9.5 MG/DL (ref 8.5–9.9)
CHLORIDE BLD-SCNC: 99 MEQ/L (ref 95–107)
CO2: 26 MEQ/L (ref 20–31)
CREAT SERPL-MCNC: 5.17 MG/DL (ref 0.5–0.9)
EKG ATRIAL RATE: 62 BPM
EKG P AXIS: 80 DEGREES
EKG P-R INTERVAL: 262 MS
EKG Q-T INTERVAL: 472 MS
EKG QRS DURATION: 126 MS
EKG QTC CALCULATION (BAZETT): 479 MS
EKG R AXIS: -62 DEGREES
EKG T AXIS: 69 DEGREES
EKG VENTRICULAR RATE: 62 BPM
GFR AFRICAN AMERICAN: 10.1
GFR NON-AFRICAN AMERICAN: 8.4
GLOBULIN: 2.9 G/DL (ref 2.3–3.5)
GLUCOSE BLD-MCNC: 112 MG/DL (ref 70–99)
GLUCOSE BLD-MCNC: 121 MG/DL (ref 70–99)
GLUCOSE BLD-MCNC: 136 MG/DL (ref 70–99)
GLUCOSE BLD-MCNC: 137 MG/DL (ref 70–99)
GLUCOSE BLD-MCNC: 153 MG/DL (ref 70–99)
PERFORMED ON: ABNORMAL
POTASSIUM SERPL-SCNC: 5.6 MEQ/L (ref 3.4–4.9)
SODIUM BLD-SCNC: 137 MEQ/L (ref 135–144)
TOTAL PROTEIN: 6.7 G/DL (ref 6.3–8)

## 2022-06-03 PROCEDURE — 2700000000 HC OXYGEN THERAPY PER DAY

## 2022-06-03 PROCEDURE — 02HV33Z INSERTION OF INFUSION DEVICE INTO SUPERIOR VENA CAVA, PERCUTANEOUS APPROACH: ICD-10-PCS | Performed by: INTERNAL MEDICINE

## 2022-06-03 PROCEDURE — 99223 1ST HOSP IP/OBS HIGH 75: CPT | Performed by: RADIOLOGY

## 2022-06-03 PROCEDURE — 6360000002 HC RX W HCPCS: Performed by: RADIOLOGY

## 2022-06-03 PROCEDURE — 76937 US GUIDE VASCULAR ACCESS: CPT

## 2022-06-03 PROCEDURE — 36558 INSERT TUNNELED CV CATH: CPT | Performed by: RADIOLOGY

## 2022-06-03 PROCEDURE — 6370000000 HC RX 637 (ALT 250 FOR IP): Performed by: INTERNAL MEDICINE

## 2022-06-03 PROCEDURE — 36415 COLL VENOUS BLD VENIPUNCTURE: CPT

## 2022-06-03 PROCEDURE — 2500000003 HC RX 250 WO HCPCS: Performed by: RADIOLOGY

## 2022-06-03 PROCEDURE — 93010 ELECTROCARDIOGRAM REPORT: CPT | Performed by: INTERNAL MEDICINE

## 2022-06-03 PROCEDURE — 0JH63XZ INSERTION OF TUNNELED VASCULAR ACCESS DEVICE INTO CHEST SUBCUTANEOUS TISSUE AND FASCIA, PERCUTANEOUS APPROACH: ICD-10-PCS | Performed by: INTERNAL MEDICINE

## 2022-06-03 PROCEDURE — 2709999900 IR TUNNELED CVC PLACE WO SQ PORT/PUMP > 5 YEARS

## 2022-06-03 PROCEDURE — 2580000003 HC RX 258

## 2022-06-03 PROCEDURE — 90935 HEMODIALYSIS ONE EVALUATION: CPT

## 2022-06-03 PROCEDURE — 77001 FLUOROGUIDE FOR VEIN DEVICE: CPT | Performed by: RADIOLOGY

## 2022-06-03 PROCEDURE — 36558 INSERT TUNNELED CV CATH: CPT

## 2022-06-03 PROCEDURE — 1210000000 HC MED SURG R&B

## 2022-06-03 PROCEDURE — 76937 US GUIDE VASCULAR ACCESS: CPT | Performed by: RADIOLOGY

## 2022-06-03 PROCEDURE — 80053 COMPREHEN METABOLIC PANEL: CPT

## 2022-06-03 PROCEDURE — 2580000003 HC RX 258: Performed by: RADIOLOGY

## 2022-06-03 PROCEDURE — 2709999900 IR US HEMODIALYSIS ACCESS EVAL

## 2022-06-03 PROCEDURE — 5A1D70Z PERFORMANCE OF URINARY FILTRATION, INTERMITTENT, LESS THAN 6 HOURS PER DAY: ICD-10-PCS | Performed by: INTERNAL MEDICINE

## 2022-06-03 PROCEDURE — 77001 FLUOROGUIDE FOR VEIN DEVICE: CPT

## 2022-06-03 RX ORDER — HEPARIN SODIUM (PORCINE) LOCK FLUSH IV SOLN 100 UNIT/ML 100 UNIT/ML
SOLUTION INTRAVENOUS
Status: COMPLETED | OUTPATIENT
Start: 2022-06-03 | End: 2022-06-03

## 2022-06-03 RX ORDER — SODIUM CHLORIDE 9 MG/ML
INJECTION, SOLUTION INTRAVENOUS
Status: DISPENSED
Start: 2022-06-03 | End: 2022-06-04

## 2022-06-03 RX ORDER — LIDOCAINE HYDROCHLORIDE 20 MG/ML
INJECTION, SOLUTION INFILTRATION; PERINEURAL
Status: COMPLETED | OUTPATIENT
Start: 2022-06-03 | End: 2022-06-03

## 2022-06-03 RX ORDER — HEPARIN SODIUM 1000 [USP'U]/ML
2000 INJECTION, SOLUTION INTRAVENOUS; SUBCUTANEOUS ONCE
Status: COMPLETED | OUTPATIENT
Start: 2022-06-03 | End: 2022-06-06

## 2022-06-03 RX ORDER — 0.9 % SODIUM CHLORIDE 0.9 %
INTRAVENOUS SOLUTION INTRAVENOUS CONTINUOUS PRN
Status: COMPLETED | OUTPATIENT
Start: 2022-06-03 | End: 2022-06-03

## 2022-06-03 RX ADMIN — LIDOCAINE HYDROCHLORIDE 20 ML: 20 INJECTION, SOLUTION INFILTRATION; PERINEURAL at 17:31

## 2022-06-03 RX ADMIN — ATORVASTATIN CALCIUM 40 MG: 40 TABLET, FILM COATED ORAL at 21:09

## 2022-06-03 RX ADMIN — INSULIN GLARGINE 30 UNITS: 100 INJECTION, SOLUTION SUBCUTANEOUS at 21:42

## 2022-06-03 RX ADMIN — FUROSEMIDE 100 MG: 80 TABLET ORAL at 21:09

## 2022-06-03 RX ADMIN — SODIUM CHLORIDE 500 ML: 9 INJECTION, SOLUTION INTRAVENOUS at 17:25

## 2022-06-03 RX ADMIN — MICONAZOLE NITRATE: 2 POWDER TOPICAL at 21:08

## 2022-06-03 RX ADMIN — MICONAZOLE NITRATE: 2 POWDER TOPICAL at 07:54

## 2022-06-03 RX ADMIN — HEPARIN 4.6 UNITS: 100 SYRINGE at 17:42

## 2022-06-03 NOTE — PROGRESS NOTES
06/03/22    From: HOME W/EJQ [facility]    Admit:HYPERKALEMIA    PMH: CKD-4, ESRD ON HD CHAIR TIME 1200, ANEMIA, CAD SP CABG, CVA, DM 2, NEUROPATHY, SCHIZOPHRENIA PARANOID CHRONIC, HTN, METABOLIC SYNDROME, HEP C, SEIZURES, URINARY INCONT, RECURRENT FALLS, UTI, L HEMIPARESIS, CHRONIC DIASTOLIC CHF, ANGINA, ASTHMA, COVID 19, NSTEMI, DYSPHAGIA   , K + 6.3    Anticipated Discharge Disposition: TBD ? HOME [facility]? PENDING NEEDS     Patient Mobility or PT/OT ordered:  Consults:  IR, ?  PALL CARE     Covid result &/or vacc status:     Barriers to Discharge: CONSULTS    Assessments: CMI, ACP DONE IN ED

## 2022-06-03 NOTE — CARE COORDINATION
MET W/ PT TO DISCUSS DISCHARGE PLAN. PT HAS [facility] OUT OF [place] AND WAS GETTING OP PT AT [address] [facility]. PT WOULD LIKE TO RESUME BOTH AT DISCHARGE. CM/LSW TO FOLLOW.

## 2022-06-03 NOTE — PROGRESS NOTES
Pt assessment and vitals complete and stable. Pt resting in bed at this time. Awaiting consult from Dr. Joshua Ashby and making a plan of care. Pt voices understanding of plan. Denies needs at this time.

## 2022-06-03 NOTE — CARE COORDINATION
This LSW met with patient at bedside this am. Patient is awaiting tunneled dialysis catheter or fistulagram- patient resting comfortable in bed. Patient will resume regular schedule at [facility] dialysis [place] upon discharge. AFUAW and CM to follow.   Electronically signed by STEPHENIE Roberson, HIPOLITO on 6/3/22 at 10:50 AM EDT

## 2022-06-03 NOTE — CARE COORDINATION
[facility] [place] Case Management Initial Discharge Assessment    Met with Patient to discuss discharge plan. PCP: Lida Walker MD                                Date of Last Visit: 6 months    VA Patient: No        VA Notified: no    If no PCP, list provided? N/A    Discharge Planning    Living Arrangements: independently at home    Who do you live with? daughter    Who helps you with your care:  self or family    If lives at home:     Do you have any barriers navigating in your home? no    Patient can perform ADL? Yes    Current Services (outpatient and in home) :  [facility] provides an aide 7 hr every day. pt states that they will need to be contacted to resume her care before she is d/c)    Dialysis: Yes, Location [facility] t/th/sat, Chair Time 1200    Is transportation available to get to your appointments? Yes    DME Equipment:  yes - walker    Respiratory equipment: None    Respiratory provider:  no     Pharmacy:  [facility] - [facility]    Consult with Medication Assistance Program?  No      Does Patient Have a High-Risk for Readmission Diagnosis (CHF, PN, MI, COPD)? No    Initial Discharge Plan? (Note: please see concurrent daily documentation for any updates after initial note). Pt states that she would plan to go home with [facility] unless there would be a change in her condition. She will need a resume with [facility]. Cm to assess for further d/c needs and referrals.     Readmission Risk              Risk of Unplanned Readmission:  30         Electronically signed by Zaid Benson RN on 6/2/2022 at 8:18 PM

## 2022-06-03 NOTE — ACP (ADVANCE CARE PLANNING)
Advance Care Planning     Advance Care Planning Activator (Inpatient)  Conversation Note      Date of ACP Conversation: 6/2/2022     Conversation Conducted with: Patient with Decision Making Capacity    ACP Activator: Bill Ramos RN    Pt has [address] papers on the chart and verifies that they are consistent with her wishes. Health Care Decision Maker:     Current Designated Health Care Decision Maker:     Primary Decision MakeWinter Leslie - [phone]      Care Preferences    Ventilation: \"If you were in your present state of health and suddenly became very ill and were unable to breathe on your own, what would your preference be about the use of a ventilator (breathing machine) if it were available to you? \"      Would the patient desire the use of ventilator (breathing machine)?: yes    \"If your health worsens and it becomes clear that your chance of recovery is unlikely, what would your preference be about the use of a ventilator (breathing machine) if it were available to you? \"     Would the patient desire the use of ventilator (breathing machine)?: No      Resuscitation  \"CPR works best to restart the heart when there is a sudden event, like a heart attack, in someone who is otherwise healthy. Unfortunately, CPR does not typically restart the heart for people who have serious health conditions or who are very sick. \"    \"In the event your heart stopped as a result of an underlying serious health condition, would you want attempts to be made to restart your heart (answer \"yes\" for attempt to resuscitate) or would you prefer a natural death (answer \"no\" for do not attempt to resuscitate)? \" yes       [x] Yes   [] No   Educated Patient / Naomi Terrazas regarding differences between Advance Directives and portable DNR orders.     Length of ACP Conversation in minutes:  10    Conversation Outcomes:  [x] ACP discussion completed  [] Existing advance directive reviewed with patient; no changes to patient's previously recorded wishes  [] New Advance Directive completed  [] Portable Do Not Rescitate prepared for Provider review and signature  [] POLST/POST/MOLST/MOST prepared for Provider review and signature      Follow-up plan:    [] Schedule follow-up conversation to continue planning  [] Referred individual to Provider for additional questions/concerns   [] Advised patient/agent/surrogate to review completed ACP document and update if needed with changes in condition, patient preferences or care setting    [x] This note routed to one or more involved healthcare providers

## 2022-06-03 NOTE — H&P
[address]                      [address], [address] [facility]                              HISTORY AND PHYSICAL    PATIENT NAME: Dominga Gray                  :        1960  MED REC NO:   [id number]                            ROOM:       [id number]  ACCOUNT NO:   [de-identified]                           ADMIT DATE: 2022  PROVIDER:     Cecy Aquino DO    HISTORY OF PRESENT ILLNESS:  This is a 77-year-old admitted to the  hospital who required access for dialysis. The patient has a  significant history of coronary artery disease, has had bypass at the  [facility] approximately one month ago. She has had a previous  CVA, has known anemia due to her CKD V, and has diabetes with  neuropathy. The patient appears to be in no distress. No blood transfusion. PAST MEDICAL HISTORY:  End-stage renal disease, diabetes, CVA with  left-sided weakness, COPD, hyperlipidemia, hypertension, history of  viral hepatitis C. PAST SURGICAL HISTORY:  Right and left knee replacement, ,  colonoscopy, coronary artery bypass stenting previous remote time,  hysterectomy, bilateral knee replacements, toe amputation of right foot. HABITS:  No smoking. No alcohol use. ALLERGIES TO MEDICATIONS:  Would be none listed on the chart. PHYSICAL EXAMINATION:  VITAL SIGNS:  5 feet and 6 inches, 220 pounds. Blood pressure is  140/70, heart rate 65, respirations 18, afebrile. HEENT:  Normocephalic. Pupils equal and reactive to light. Extraocular  muscles intact. NECK:  Supple. No JVD or adenopathy. CHEST:  Lungs are relatively clear. No wheezing, rales, or rhonchi. No  accessory muscle use. CARDIOVASCULAR:  Heart is regular with 1/6 systolic murmur. Midline  healed surgical scar present. ABDOMEN:  Soft. No guarding or rigidity. Bowel sounds are present. GENITALIA:  Text. EXTREMITIES:  Showed the patient has left-sided weakness.   The patient  moves all limbs.    IMPRESSION:  1. Graft access, chronic. 2.  End-stage renal disease, dialysis on Tuesday, Thursdays, and  Saturdays. 3.  Organic heart disease, history of stents, status post coronary  artery bypass grafting. 4.  Anemia. 5.  History of CVA with left-sided weakness. 6.  Diabetes mellitus with neuropathy. 7.  History of schizophrenia. PLAN:  Dr. Tamera Barfield was notified to _____ graft. We will do dialysis today  once that has been performed, if unable to be _____, a catheter will be  placed and dialysis will be performed today.         Jagruti Sanders DO    D: 06/03/2022 8:14:36       T: 06/03/2022 10:47:18     [email or web address]  Job#: [id number]     Doc#: [id number]    CC:

## 2022-06-03 NOTE — OR NURSING
FISTULAGRAM PROCEDURE:     1650 - Pt arrived to cath lab #3 via bed. Pt transferred onto procedure table and positioned supine. Pt A&Ox3. VS & Tele monitors applied. VSS. 2L NC applied with ETCO2. Reassurance and support given. Procedure explained and consent obtained. NH, RT specials tech, positioned Right arm out laterally on board. 46 - Dr Chinyere Cope present and speaking with patient, all questions answered before proceeding. Dr Chinyere Cope evaluated Right arm graft using Ultrasound guidance, determined that graft is completely occluded. Plan is to proceed with tunneled hemodialysis catheter insertion at this time. Pt updated on plan of care and is agreeable. TUNNELED HD CATH INSERTION PROCEDURE:     1720 - Left neck vessels assessed for patency using US guidance. Site approved by Dr Chinyere Cope.       Myrle Huh - Left neck IJ area cleansed generously with Chloroprep and full body drape applied. 1730 - Timeout performed for Tunneled Hemodialysis Catheter Insertion. 5 - Dr Chinyere Cope administered 2% lidocaine to Left IJ site with US guidance. 1732 - 5 fr MP set used to access Left IJ site with US guidance and guidewire inserted through introducer. 1735 - Symetrex long term dilators 15 F and 15 F used prior to 12 F introducer sheath inserted. 1737 - Additional 2% lidocaine given along tunneled area. 1738 - Symetrex  15.5 F by 23 cm HD catheter (Lot # [id number] / Exp: 06/15/2026)  tunneled and inserted through sheath. 1739 - Peel away sheath and wire removed. 1740 - Both blue and red ports aspirate and inject appropriately per Dr Chinyere Cope.    1742 - Heparin instilled into red and blue ports 2.3ml into each port. 1743 -  Manual pressure to IJ site per NH, RT.     1753 - Sutures to catheter site per NH, RT.    1754 - Manual pressure resumed at IJ site. 1806 - Left IJ site dressed with skin affix. Catheter site dressed with gauze and tegaderm.      1807 - Both sites without bleeding at this time.  Pt tolerated procedure well. Pressure dressing applied to IJ site (rolled gauze and foam tape) over skin affix. 1810 - Pt was given support and reassurance throughout procedure. Pt assisted off table onto bed. Report given to Huber Alexander RN and dialysis unit - they are ready for patient to come for treatment. Transport requested for . 1815 - Pt taken to dialysis unit via bed.

## 2022-06-04 LAB
GLUCOSE BLD-MCNC: 133 MG/DL (ref 70–99)
GLUCOSE BLD-MCNC: 143 MG/DL (ref 70–99)
GLUCOSE BLD-MCNC: 171 MG/DL (ref 70–99)
PERFORMED ON: ABNORMAL

## 2022-06-04 PROCEDURE — 6370000000 HC RX 637 (ALT 250 FOR IP): Performed by: INTERNAL MEDICINE

## 2022-06-04 PROCEDURE — 90935 HEMODIALYSIS ONE EVALUATION: CPT

## 2022-06-04 PROCEDURE — 1210000000 HC MED SURG R&B

## 2022-06-04 RX ADMIN — MICONAZOLE NITRATE: 2 POWDER TOPICAL at 23:34

## 2022-06-04 RX ADMIN — ONDANSETRON HYDROCHLORIDE 4 MG: 4 TABLET, FILM COATED ORAL at 21:56

## 2022-06-04 RX ADMIN — ACETAMINOPHEN 650 MG: 325 TABLET ORAL at 14:47

## 2022-06-04 RX ADMIN — ASPIRIN 81 MG 81 MG: 81 TABLET ORAL at 09:10

## 2022-06-04 RX ADMIN — ARIPIPRAZOLE 5 MG: 5 TABLET ORAL at 09:10

## 2022-06-04 RX ADMIN — MICONAZOLE NITRATE: 2 POWDER TOPICAL at 09:13

## 2022-06-04 RX ADMIN — SERTRALINE HYDROCHLORIDE 50 MG: 50 TABLET ORAL at 09:10

## 2022-06-04 RX ADMIN — ACETAMINOPHEN 650 MG: 325 TABLET ORAL at 21:52

## 2022-06-04 RX ADMIN — ATORVASTATIN CALCIUM 40 MG: 40 TABLET, FILM COATED ORAL at 21:52

## 2022-06-04 RX ADMIN — FUROSEMIDE 100 MG: 80 TABLET ORAL at 09:10

## 2022-06-04 RX ADMIN — ACETAMINOPHEN 650 MG: 325 TABLET ORAL at 05:10

## 2022-06-04 RX ADMIN — METOPROLOL TARTRATE 25 MG: 25 TABLET, FILM COATED ORAL at 09:10

## 2022-06-04 RX ADMIN — NEPHROCAP 1 MG: 1 CAP ORAL at 09:10

## 2022-06-04 RX ADMIN — PANTOPRAZOLE SODIUM 20 MG: 20 TABLET, DELAYED RELEASE ORAL at 09:10

## 2022-06-04 RX ADMIN — INSULIN LISPRO 1 UNITS: 100 INJECTION, SOLUTION INTRAVENOUS; SUBCUTANEOUS at 21:53

## 2022-06-04 RX ADMIN — INSULIN GLARGINE 30 UNITS: 100 INJECTION, SOLUTION SUBCUTANEOUS at 21:53

## 2022-06-04 RX ADMIN — FUROSEMIDE 100 MG: 80 TABLET ORAL at 21:52

## 2022-06-04 RX ADMIN — ACETAMINOPHEN 650 MG: 325 TABLET ORAL at 01:04

## 2022-06-04 RX ADMIN — INSULIN LISPRO 2 UNITS: 100 INJECTION, SOLUTION INTRAVENOUS; SUBCUTANEOUS at 12:19

## 2022-06-04 ASSESSMENT — PAIN SCALES - GENERAL
PAINLEVEL_OUTOF10: 7
PAINLEVEL_OUTOF10: 7

## 2022-06-04 ASSESSMENT — PAIN DESCRIPTION - LOCATION: LOCATION: NECK

## 2022-06-04 NOTE — PROGRESS NOTES
Nephrology Progress Note    Assessment:  ESRDX  S/P CABGX  Anemia  Hx CVA  DM type-2  Right arm access non functioning      Plan:  Dialysis today  Dr Temo Loera to work on arm monday    Patient Active Problem List:     Atherosclerotic heart disease of native coronary artery with unspecified angina pectoris (HCC)     Schizophrenia, paranoid, chronic     Metabolic syndrome     Vitamin B 12 deficiency     Cerebral microvascular disease     Mixed hyperlipidemia     Other hammer toe (acquired)     Vitamin D insufficiency     Incontinence of urine     Diabetic nephropathy with proteinuria ([address]     Essential (primary) hypertension     History of type C viral hepatitis     Urinary incontinence due to cognitive impairment     History of seizures     Stented coronary artery-plan is to stay on Plavix indefinately per Dr Marah Bingham     Other specified diabetes mellitus with diabetic neuropathy, unspecified ([address]     Controlled type 2 diabetes mellitus with diabetic neuropathy, with long-term current use of insulin (HCC)     Hemiparesis, left (HCC)     Angina, class II ([address]     Pain, unspecified     Tardive dyskinesia     Shortness of breath     Chronic diastolic congestive heart failure ([address]     Sleep apnea, unspecified     Pulmonary hypertension, unspecified (HCC)     Class 2 severe obesity with serious comorbidity and body mass index (BMI) of 36.0 to 36.9 in adult [facility])     Edema     Closed supracondylar fracture of right humerus     Other chronic pain     Palliative care patient     Recurrent falls     Renal failure     Difficulty in walking     ESRD (end stage renal disease) on dialysis ([address]     Weakness     Other seizures (HCC)     Moderate persistent asthma without complication     [id number]     Post PTCA     Falls frequently     Chest wall contusion, left, initial encounter     Headache, unspecified     Paranoid schizophrenia ([address]     Compression fracture of spine ([address]     Closed rib fracture     Depression     Chronic obstructive pulmonary disease (HCC)     Critical illness polyneuropathy (HCC)     Multiple closed fractures of ribs of right side     Nonrheumatic mitral valve regurgitation     Nonrheumatic tricuspid valve regurgitation     Need for extended care facility     Chronic pain of both shoulders     Hemodialysis-associated hypotension     Anginal chest pain at rest [facility])     Chest pain     Unstable angina (HCC)     NSTEMI (non-ST elevated myocardial infarction) (HCC)     CKD (chronic kidney disease) stage 4, GFR 15-29 ml/min ([facility])     Hyperkalemia      Subjective:  Admit Date: 6/2/2022    Interval History: no issues    Medications:  Scheduled Meds:   heparin (porcine)  2,000 Units IntraVENous Once    insulin lispro  0-12 Units SubCUTAneous TID WC    insulin lispro  0-6 Units SubCUTAneous Nightly    metoprolol tartrate  25 mg Oral Daily    insulin glargine  30 Units SubCUTAneous Nightly    aspirin  81 mg Oral Daily    b complex-C-folic acid  1 capsule Oral Daily    sertraline  50 mg Oral Daily    atorvastatin  40 mg Oral Nightly    [START ON 6/6/2022] midodrine  5 mg Oral Once per day on Mon Wed Fri    ARIPiprazole  5 mg Oral Daily    furosemide  100 mg Oral BID    pantoprazole  20 mg Oral Daily    miconazole   Topical BID     Continuous Infusions:    CBC:   Recent Labs     06/02/22  1515   WBC 7.6   HGB 10.2*        CMP:    Recent Labs     06/02/22  1515 06/03/22  0821   * 137   K 6.3* 5.6*   CL 97 99   CO2 24 26   BUN 16 19   CREATININE 4.73* 5.17*   GLUCOSE 312* 136*   CALCIUM 9.1 9.5   LABGLOM 9.3* 8.4*     Troponin: No results for input(s): TROPONINI in the last 72 hours. BNP: No results for input(s): BNP in the last 72 hours. INR: No results for input(s): INR in the last 72 hours. Lipids: No results for input(s): CHOL, LDLDIRECT, TRIG, HDL, AMYLASE, LIPASE in the last 72 hours.   Liver:   Recent Labs     06/03/22  0821   AST 16   ALT 12   ALKPHOS 113   PROT 6.7   LABALBU 3.8   BILITOT 1.2*     Iron:  No results for input(s): IRONS, FERRITIN in the last 72 hours. Invalid input(s): LABIRONS  Urinalysis: No results for input(s): UA in the last 72 hours.     Objective:  Vitals: /67   Pulse 64   Temp 98.1 °F (36.7 °C) (Oral)   Resp 16   Ht 5' 6\" (1.676 m)   Wt 220 lb (99.8 kg)   LMP  (LMP Unknown)   SpO2 94%   BMI 35.51 kg/m²    Wt Readings from Last 3 Encounters:   06/03/22 220 lb (99.8 kg)   05/25/22 217 lb (98.4 kg)   04/25/22 221 lb (100.2 kg)      24HR INTAKE/OUTPUT:      Intake/Output Summary (Last 24 hours) at 6/4/2022 1020  Last data filed at 6/3/2022 2035  Gross per 24 hour   Intake --   Output 2400 ml   Net -2400 ml       General: alert, in no apparent distress  HEENT: normocephalic, atraumatic, anicteric  Neck: supple, no mass  Lungs: non-labored respirations, clear to auscultation bilaterally  Heart: regular rate and rhythm, no murmurs or rubs  Abdomen: soft, non-tender, non-distended  Ext: no cyanosis, no peripheral edema  Neuro: alert and oriented, no gross abnormalities  Psych: normal mood and affect  Skin: no rash      Electronically signed by Malcom Avila DO, MD

## 2022-06-04 NOTE — FLOWSHEET NOTE
06/04/22 1930   Observations & Evaluations   Level of Consciousness Alert (0)   Oriented X 3   Heart Rhythm Regular   Respiratory Quality/Effort Unlabored   Bilateral Breath Sounds Diminished   Appetite Good   Vital Signs   BP (!) 111/53   Temp 98.1 °F (36.7 °C)   Heart Rate 63   Resp 18   Post-Hemodialysis Assessment   Post-Treatment Procedures Blood returned   Rinseback Volume (ml) 200 ml   Total Liters Processed (l/min) 68.8 l/min   Dialyzer Clearance Clear   Duration of Treatment (minutes) 180 minutes   Hemodialysis Intake (ml) 400 ml   Hemodialysis Output (ml) 2200 ml   NET Removed (ml) 1800   Tolerated Treatment Good   Patient Response to Treatment REPORT GIVEN TO Katerina Taylor RN. PT C/O CRAMPING WITH 30 MINUTES RTD, 1.8l REMOVED.     Patient Disposition Return to room

## 2022-06-04 NOTE — CARE COORDINATION
MET WITH PATIENT, FREEDOM OF CHOICE OFFERED STATES PLAN IS TO RETURN HOME WITH [facility] AND OUTPATIENT PT AT [address] [place] [address], WILL NEED RESUME ORDERS FOR BOTH.

## 2022-06-04 NOTE — FLOWSHEET NOTE
06/03/22 2035   Observations & Evaluations   Level of Consciousness Alert (0)   Oriented X 3   Heart Rhythm Regular   Respiratory Quality/Effort Unlabored   Bilateral Breath Sounds Diminished   Appetite Good   Abdomen Inspection Scaphoid   Vital Signs   BP (!) 147/66   Temp 98.6 °F (37 °C)   Heart Rate 63   Resp 18   Pain Assessment   Pain Assessment None - Denies Pain   During Hemodialysis Assessment   Ultrafiltration Removed (mL) 2400 ml/min   Post-Hemodialysis Assessment   Total Liters Processed (l/min) 45.6 l/min   Dialyzer Clearance Clear   Duration of Treatment (minutes) 120 minutes   Hemodialysis Output (ml) 2400 ml   Tolerated Treatment Good   Patient Response to Treatment REPORT GIVEN TO HERRERA GARSIA RN.

## 2022-06-05 LAB
GLUCOSE BLD-MCNC: 142 MG/DL (ref 70–99)
GLUCOSE BLD-MCNC: 163 MG/DL (ref 70–99)
GLUCOSE BLD-MCNC: 189 MG/DL (ref 70–99)
GLUCOSE BLD-MCNC: 204 MG/DL (ref 70–99)
PERFORMED ON: ABNORMAL

## 2022-06-05 PROCEDURE — 6370000000 HC RX 637 (ALT 250 FOR IP): Performed by: INTERNAL MEDICINE

## 2022-06-05 PROCEDURE — 1210000000 HC MED SURG R&B

## 2022-06-05 RX ADMIN — ACETAMINOPHEN 650 MG: 325 TABLET ORAL at 09:21

## 2022-06-05 RX ADMIN — INSULIN LISPRO 4 UNITS: 100 INJECTION, SOLUTION INTRAVENOUS; SUBCUTANEOUS at 12:28

## 2022-06-05 RX ADMIN — ATORVASTATIN CALCIUM 40 MG: 40 TABLET, FILM COATED ORAL at 20:50

## 2022-06-05 RX ADMIN — FUROSEMIDE 100 MG: 80 TABLET ORAL at 20:50

## 2022-06-05 RX ADMIN — NEPHROCAP 1 MG: 1 CAP ORAL at 09:21

## 2022-06-05 RX ADMIN — ACETAMINOPHEN 650 MG: 325 TABLET ORAL at 20:49

## 2022-06-05 RX ADMIN — METOPROLOL TARTRATE 25 MG: 25 TABLET, FILM COATED ORAL at 09:20

## 2022-06-05 RX ADMIN — MICONAZOLE NITRATE: 2 POWDER TOPICAL at 23:26

## 2022-06-05 RX ADMIN — MICONAZOLE NITRATE: 2 POWDER TOPICAL at 09:28

## 2022-06-05 RX ADMIN — PANTOPRAZOLE SODIUM 20 MG: 20 TABLET, DELAYED RELEASE ORAL at 09:20

## 2022-06-05 RX ADMIN — ARIPIPRAZOLE 5 MG: 5 TABLET ORAL at 09:20

## 2022-06-05 RX ADMIN — INSULIN LISPRO 1 UNITS: 100 INJECTION, SOLUTION INTRAVENOUS; SUBCUTANEOUS at 23:24

## 2022-06-05 RX ADMIN — SERTRALINE HYDROCHLORIDE 50 MG: 50 TABLET ORAL at 09:21

## 2022-06-05 RX ADMIN — ASPIRIN 81 MG 81 MG: 81 TABLET ORAL at 09:21

## 2022-06-05 RX ADMIN — INSULIN GLARGINE 30 UNITS: 100 INJECTION, SOLUTION SUBCUTANEOUS at 23:23

## 2022-06-05 RX ADMIN — FUROSEMIDE 100 MG: 80 TABLET ORAL at 09:20

## 2022-06-05 RX ADMIN — INSULIN LISPRO 2 UNITS: 100 INJECTION, SOLUTION INTRAVENOUS; SUBCUTANEOUS at 18:05

## 2022-06-05 ASSESSMENT — PAIN DESCRIPTION - LOCATION: LOCATION: NECK

## 2022-06-05 ASSESSMENT — PAIN DESCRIPTION - ORIENTATION: ORIENTATION: MID

## 2022-06-05 ASSESSMENT — PAIN SCALES - GENERAL
PAINLEVEL_OUTOF10: 4
PAINLEVEL_OUTOF10: 0
PAINLEVEL_OUTOF10: 7
PAINLEVEL_OUTOF10: 4

## 2022-06-05 ASSESSMENT — PAIN DESCRIPTION - DESCRIPTORS: DESCRIPTORS: ACHING

## 2022-06-05 ASSESSMENT — PAIN - FUNCTIONAL ASSESSMENT: PAIN_FUNCTIONAL_ASSESSMENT: ACTIVITIES ARE NOT PREVENTED

## 2022-06-05 NOTE — PROGRESS NOTES
Nephrology Progress Note    Assessment:  ESRDX IHD  Access failure                                             DM type-2  Hx CVA  Anemia  S/P CABGx   COPD      Plan: right arm intervention Dr Ileana Terrell tomorrow     Patient Active Problem List:     Atherosclerotic heart disease of native coronary artery with unspecified angina pectoris (HCC)     Schizophrenia, paranoid, chronic     Metabolic syndrome     Vitamin B 12 deficiency     Cerebral microvascular disease     Mixed hyperlipidemia     Other hammer toe (acquired)     Vitamin D insufficiency     Incontinence of urine     Diabetic nephropathy with proteinuria ([address]     Essential (primary) hypertension     History of type C viral hepatitis     Urinary incontinence due to cognitive impairment     History of seizures     Stented coronary artery-plan is to stay on Plavix indefinately per Dr Shayan Valdivia     Other specified diabetes mellitus with diabetic neuropathy, unspecified ([address]     Controlled type 2 diabetes mellitus with diabetic neuropathy, with long-term current use of insulin (HCC)     Hemiparesis, left (HCC)     Angina, class II ([address]     Pain, unspecified     Tardive dyskinesia     Shortness of breath     Chronic diastolic congestive heart failure ([address]     Sleep apnea, unspecified     Pulmonary hypertension, unspecified (HCC)     Class 2 severe obesity with serious comorbidity and body mass index (BMI) of 36.0 to 36.9 in [facility])     Edema     Closed supracondylar fracture of right humerus     Other chronic pain     Palliative care patient     Recurrent falls     Renal failure     Difficulty in walking     ESRD (end stage renal disease) on dialysis ([address]     Weakness     Other seizures (HCC)     Moderate persistent asthma without complication     [id number]     Post PTCA     Falls frequently     Chest wall contusion, left, initial encounter     Headache, unspecified     Paranoid schizophrenia ([address]     Compression fracture of spine ([address]     Closed rib fracture Depression     Chronic obstructive pulmonary disease (HCC)     Critical illness polyneuropathy (HCC)     Multiple closed fractures of ribs of right side     Nonrheumatic mitral valve regurgitation     Nonrheumatic tricuspid valve regurgitation     Need for extended care facility     Chronic pain of both shoulders     Hemodialysis-associated hypotension     Anginal chest pain at rest [facility])     Chest pain     Unstable angina (HCC)     NSTEMI (non-ST elevated myocardial infarction) ([facility])     CKD (chronic kidney disease) stage 4, GFR 15-29 ml/min ([facility])     Hyperkalemia      Subjective:  Admit Date: 6/2/2022    Interval History: awaiting surgery am  No complaints    Medications:  Scheduled Meds:   heparin (porcine)  2,000 Units IntraVENous Once    insulin lispro  0-12 Units SubCUTAneous TID WC    insulin lispro  0-6 Units SubCUTAneous Nightly    metoprolol tartrate  25 mg Oral Daily    insulin glargine  30 Units SubCUTAneous Nightly    aspirin  81 mg Oral Daily    b complex-C-folic acid  1 capsule Oral Daily    sertraline  50 mg Oral Daily    atorvastatin  40 mg Oral Nightly    [START ON 6/6/2022] midodrine  5 mg Oral Once per day on Mon Wed Fri    ARIPiprazole  5 mg Oral Daily    furosemide  100 mg Oral BID    pantoprazole  20 mg Oral Daily    miconazole   Topical BID     Continuous Infusions:    CBC:   Recent Labs     06/02/22  1515   WBC 7.6   HGB 10.2*        CMP:    Recent Labs     06/02/22  1515 06/03/22  0821   * 137   K 6.3* 5.6*   CL 97 99   CO2 24 26   BUN 16 19   CREATININE 4.73* 5.17*   GLUCOSE 312* 136*   CALCIUM 9.1 9.5   LABGLOM 9.3* 8.4*     Troponin: No results for input(s): TROPONINI in the last 72 hours. BNP: No results for input(s): BNP in the last 72 hours. INR: No results for input(s): INR in the last 72 hours. Lipids: No results for input(s): CHOL, LDLDIRECT, TRIG, HDL, AMYLASE, LIPASE in the last 72 hours.   Liver:   Recent Labs     06/03/22  0821   AST 16   ALT 12 ALKPHOS 113   PROT 6.7   LABALBU 3.8   BILITOT 1.2*     Iron:  No results for input(s): IRONS, FERRITIN in the last 72 hours. Invalid input(s): LABIRONS  Urinalysis: No results for input(s): UA in the last 72 hours.     Objective:  Vitals: BP (!) 111/53   Pulse 63   Temp 98.1 °F (36.7 °C)   Resp 18   Ht 5' 6\" (1.676 m)   Wt 220 lb (99.8 kg)   LMP  (LMP Unknown)   SpO2 94%   BMI 35.51 kg/m²    Wt Readings from Last 3 Encounters:   06/03/22 220 lb (99.8 kg)   05/25/22 217 lb (98.4 kg)   04/25/22 221 lb (100.2 kg)      24HR INTAKE/OUTPUT:      Intake/Output Summary (Last 24 hours) at 6/5/2022 0943  Last data filed at 6/4/2022 1930  Gross per 24 hour   Intake 400 ml   Output 2200 ml   Net -1800 ml       General: alert, in no apparent distress  HEENT: normocephalic, atraumatic, anicteric  Neck: supple, no mass  Lungs: non-labored respirations, clear to auscultation bilaterally  Heart: regular rate and rhythm, no murmurs or rubs  Abdomen: soft, non-tender, non-distended  Ext: no cyanosis, no peripheral edema  Neuro: alert and oriented, no gross abnormalities  Psych: normal mood and affect  Skin: no rash      Electronically signed by Yudelka Reeder DO, MD

## 2022-06-05 NOTE — PROGRESS NOTES
Pt received back to unit from ed and VS are stable. Pt c/o mild pain in her neck/ cath area and medicated per MAR. Insulin covered appropriately and fall precaution in place. Continue to monitor.

## 2022-06-05 NOTE — CARE COORDINATION
PATIENT REQUESTING RESUMPTION OF PT AS OUTPATIENT WITH [facility] DC PLAN IS HOME WHEN STABLE.  FOR INTERVENTION WITH DR WEINBERG [facility] Monday FOR OCCLUDED VASCULAR GRAFT

## 2022-06-06 ENCOUNTER — HOSPITAL ENCOUNTER (INPATIENT)
Dept: INTERVENTIONAL RADIOLOGY/VASCULAR | Age: 62
Discharge: HOME | DRG: 252 | End: 2022-06-08
Payer: MEDICARE

## 2022-06-06 ENCOUNTER — APPOINTMENT (OUTPATIENT)
Dept: CARDIAC CATH/INVASIVE PROCEDURES | Age: 62
DRG: 252 | End: 2022-06-06
Payer: MEDICARE

## 2022-06-06 VITALS
SYSTOLIC BLOOD PRESSURE: 175 MMHG | OXYGEN SATURATION: 98 % | DIASTOLIC BLOOD PRESSURE: 84 MMHG | RESPIRATION RATE: 12 BRPM | HEART RATE: 65 BPM

## 2022-06-06 VITALS
TEMPERATURE: 98 F | BODY MASS INDEX: 34.65 KG/M2 | OXYGEN SATURATION: 92 % | RESPIRATION RATE: 18 BRPM | DIASTOLIC BLOOD PRESSURE: 61 MMHG | WEIGHT: 215.61 LBS | HEART RATE: 62 BPM | SYSTOLIC BLOOD PRESSURE: 125 MMHG | HEIGHT: 66 IN

## 2022-06-06 DIAGNOSIS — N18.6 ESRD ON HEMODIALYSIS (HCC): ICD-10-CM

## 2022-06-06 DIAGNOSIS — Z99.2 ESRD ON HEMODIALYSIS (HCC): ICD-10-CM

## 2022-06-06 PROCEDURE — 6360000004 HC RX CONTRAST MEDICATION: Performed by: RADIOLOGY

## 2022-06-06 PROCEDURE — 6360000002 HC RX W HCPCS

## 2022-06-06 PROCEDURE — 93990 DOPPLER FLOW TESTING: CPT | Performed by: RADIOLOGY

## 2022-06-06 PROCEDURE — 36905 THRMBC/NFS DIALYSIS CIRCUIT: CPT

## 2022-06-06 PROCEDURE — 2580000003 HC RX 258

## 2022-06-06 PROCEDURE — 6360000002 HC RX W HCPCS: Performed by: INTERNAL MEDICINE

## 2022-06-06 PROCEDURE — 36905 THRMBC/NFS DIALYSIS CIRCUIT: CPT | Performed by: RADIOLOGY

## 2022-06-06 PROCEDURE — 99152 MOD SED SAME PHYS/QHP 5/>YRS: CPT | Performed by: RADIOLOGY

## 2022-06-06 PROCEDURE — 6360000002 HC RX W HCPCS: Performed by: RADIOLOGY

## 2022-06-06 PROCEDURE — 2500000003 HC RX 250 WO HCPCS: Performed by: RADIOLOGY

## 2022-06-06 PROCEDURE — 03C73ZZ EXTIRPATION OF MATTER FROM RIGHT BRACHIAL ARTERY, PERCUTANEOUS APPROACH: ICD-10-PCS | Performed by: INTERNAL MEDICINE

## 2022-06-06 PROCEDURE — A4217 STERILE WATER/SALINE, 500 ML: HCPCS | Performed by: RADIOLOGY

## 2022-06-06 PROCEDURE — 2709999900 IR DIALYSIS FISTULAGRAM EVAL

## 2022-06-06 PROCEDURE — B51WYZZ FLUOROSCOPY OF DIALYSIS SHUNT/FISTULA USING OTHER CONTRAST: ICD-10-PCS | Performed by: INTERNAL MEDICINE

## 2022-06-06 PROCEDURE — 2580000003 HC RX 258: Performed by: RADIOLOGY

## 2022-06-06 PROCEDURE — 03773ZZ DILATION OF RIGHT BRACHIAL ARTERY, PERCUTANEOUS APPROACH: ICD-10-PCS | Performed by: INTERNAL MEDICINE

## 2022-06-06 RX ORDER — SODIUM CHLORIDE 9 MG/ML
INJECTION, SOLUTION INTRAVENOUS
Status: DISCONTINUED
Start: 2022-06-06 | End: 2022-06-06 | Stop reason: HOSPADM

## 2022-06-06 RX ORDER — FENTANYL CITRATE 50 UG/ML
INJECTION, SOLUTION INTRAMUSCULAR; INTRAVENOUS
Status: COMPLETED | OUTPATIENT
Start: 2022-06-06 | End: 2022-06-06

## 2022-06-06 RX ORDER — SODIUM CHLORIDE 9 MG/ML
INJECTION, SOLUTION INTRAVENOUS CONTINUOUS PRN
Status: COMPLETED | OUTPATIENT
Start: 2022-06-06 | End: 2022-06-06

## 2022-06-06 RX ORDER — HEPARIN SODIUM 1000 [USP'U]/ML
INJECTION, SOLUTION INTRAVENOUS; SUBCUTANEOUS
Status: COMPLETED | OUTPATIENT
Start: 2022-06-06 | End: 2022-06-06

## 2022-06-06 RX ORDER — MAGNESIUM HYDROXIDE 1200 MG/15ML
LIQUID ORAL CONTINUOUS PRN
Status: COMPLETED | OUTPATIENT
Start: 2022-06-06 | End: 2022-06-06

## 2022-06-06 RX ORDER — MIDAZOLAM HYDROCHLORIDE 2 MG/2ML
INJECTION, SOLUTION INTRAMUSCULAR; INTRAVENOUS
Status: COMPLETED | OUTPATIENT
Start: 2022-06-06 | End: 2022-06-06

## 2022-06-06 RX ORDER — LIDOCAINE HYDROCHLORIDE 20 MG/ML
INJECTION, SOLUTION INFILTRATION; PERINEURAL
Status: COMPLETED | OUTPATIENT
Start: 2022-06-06 | End: 2022-06-06

## 2022-06-06 RX ORDER — HEPARIN SODIUM 5000 [USP'U]/ML
INJECTION, SOLUTION INTRAVENOUS; SUBCUTANEOUS
Status: COMPLETED | OUTPATIENT
Start: 2022-06-06 | End: 2022-06-06

## 2022-06-06 RX ADMIN — SODIUM CHLORIDE 100 ML/HR: 9 INJECTION, SOLUTION INTRAVENOUS at 11:46

## 2022-06-06 RX ADMIN — HEPARIN SODIUM 2000 UNITS: 1000 INJECTION INTRAVENOUS; SUBCUTANEOUS at 15:37

## 2022-06-06 RX ADMIN — FENTANYL CITRATE 50 MCG: 50 INJECTION, SOLUTION INTRAMUSCULAR; INTRAVENOUS at 10:53

## 2022-06-06 RX ADMIN — SODIUM CHLORIDE 250 ML: 9 INJECTION, SOLUTION INTRAVENOUS at 10:34

## 2022-06-06 RX ADMIN — HEPARIN SODIUM 5000 UNITS: 5000 INJECTION INTRAVENOUS; SUBCUTANEOUS at 11:06

## 2022-06-06 RX ADMIN — HEPARIN SODIUM 4000 UNITS: 1000 INJECTION INTRAVENOUS; SUBCUTANEOUS at 11:05

## 2022-06-06 RX ADMIN — MIDAZOLAM HYDROCHLORIDE 0.5 MG: 1 INJECTION, SOLUTION INTRAMUSCULAR; INTRAVENOUS at 10:53

## 2022-06-06 RX ADMIN — MIDAZOLAM HYDROCHLORIDE 0.5 MG: 1 INJECTION, SOLUTION INTRAMUSCULAR; INTRAVENOUS at 11:31

## 2022-06-06 RX ADMIN — SODIUM CHLORIDE 1000 ML: 900 IRRIGANT IRRIGATION at 10:34

## 2022-06-06 RX ADMIN — HEPARIN SODIUM 1000 UNITS: 1000 INJECTION INTRAVENOUS; SUBCUTANEOUS at 10:34

## 2022-06-06 RX ADMIN — IOPAMIDOL 120 ML: 755 INJECTION, SOLUTION INTRAVENOUS at 10:55

## 2022-06-06 RX ADMIN — SODIUM CHLORIDE 1000 ML: 9 INJECTION, SOLUTION INTRAVENOUS at 11:06

## 2022-06-06 RX ADMIN — FENTANYL CITRATE 100 MCG: 50 INJECTION, SOLUTION INTRAMUSCULAR; INTRAVENOUS at 11:34

## 2022-06-06 RX ADMIN — LIDOCAINE HYDROCHLORIDE 7 ML: 20 INJECTION, SOLUTION INFILTRATION; PERINEURAL at 10:52

## 2022-06-06 ASSESSMENT — ENCOUNTER SYMPTOMS
DIARRHEA: 0
VOMITING: 0
NAUSEA: 0
BACK PAIN: 0
EYES NEGATIVE: 1
CONSTIPATION: 0
COUGH: 0
ABDOMINAL PAIN: 0
GASTROINTESTINAL NEGATIVE: 1
RESPIRATORY NEGATIVE: 1
WHEEZING: 0
SHORTNESS OF BREATH: 0
PHOTOPHOBIA: 0

## 2022-06-06 NOTE — FLOWSHEET NOTE
tx complete 2000 ml uf removed tolerated well       06/06/22 1700   Vital Signs   /60   Temp 98 °F (36.7 °C)   Heart Rate 62   Resp 18   Weight 215 lb 9.8 oz (97.8 kg)   Percent Weight Change -2   Post-Hemodialysis Assessment   Post-Treatment Procedures Blood returned;Catheter capped, clamped and heparinized x 2 ports   Machine Disinfection Process Acid/Vinegar Clean;Heat Disinfect; Exterior Machine Disinfection   Rinseback Volume (ml) 200 ml   Total Liters Processed (l/min) 57.1 l/min   Dialyzer Clearance Lightly streaked   Duration of Treatment (minutes) 180 minutes   Heparin amount administered during treatment (units) 0 units   Hemodialysis Intake (ml) 600 ml   Hemodialysis Output (ml) 2600 ml   NET Removed (ml) 2000   Tolerated Treatment Good   Bilateral Breath Sounds Diminished   Edema Generalized

## 2022-06-06 NOTE — CONSULTS
CC:   Chief Complaint   Patient presents with    Vascular Access Problem     pt was sent by Dr. Kalia Albrecht to have vascular cath placed d/t nonfunctioning fistula        HPI:  Patient is a pleasant 78-year-old woman with end-stage kidney disease on dialysis. Patient had been receiving dialysis through a right arm AV graft. Upon attempting to access graft during last dialysis session, the  noted that there is no blood flow within the graft indicating complete thrombosis and unable to perform dialysis. Patient was admitted. Interventional radiology was consulted for immediate and urgent dialysis access. Patient denies any complaints including any chest pain, shortness of breath, nausea, or vomiting.          Family History   Problem Relation Age of Onset   [facility] Cancer Mother 76        survived   [facility] Breast Cancer Mother     Hypertension Father     Diabetes Sister     Mental Illness Sister     Colon Cancer Neg Hx        Past Surgical History:   Procedure Laterality Date     SECTION      x1    COLONOSCOPY  2014    Dr. rFank Acharya      x1 Dr. Venus Hernandez, Dr Kelvin Moran 2018    Margette Mess  08/10/2021    [facility]    DIAGNOSTIC CARDIAC CATH LAB PROCEDURE  10/02/2019    DIALYSIS CATHETER INSERTION Left 2022    Tunneled Symetrex 15.5F x 23cm hemodialysis catheter inserted by Dr. Brooke Sow, TOTAL ABDOMINAL      one ovary intact, Dr Jacqueline Hess, menorrhagia    [place] TOTAL KNEE ARTHROPLASTY Left 2018    LEFT KNEE TOTAL KNEE ARTHROPLASTY, SHAYNA, NERVE BLOCK performed by Candace Abrams MD at [address] [place] [address] [facility]      TOE AMPUTATION Right     TOTAL KNEE ARTHROPLASTY  2016    Dr John Nicholas TUNNELED [address] Right 2020    tunneled HD catheter per Dr Evert Jones        Past Medical History:   Diagnosis Date    [facility]ina at rest [facility]) 2020    Anxiety     CAD S/P percutaneous coronary angioplasty 2015, 2018    stents per dr aHydee Nichols    CHF (congestive heart failure) ([address]     CKD (chronic kidney disease) stage 4, GFR 15-29 ml/min ([address] 2/24/2018    CKD stage 4 due to type 2 diabetes mellitus ([address]     Contusion of right chest wall 2/16/2021    COPD (chronic obstructive pulmonary disease) ([address]     Diabetic nephropathy with proteinuria ([address] 2014    DJD (degenerative joint disease) of knee     Dr John Nicholas GERD (gastroesophageal reflux disease)     Hemiparesis, left ([address] 2013    entered Assisted Living ([facility])    Hemodialysis patient [facility])     Hemodialysis-associated hypotension 10/22/2021    History of heart failure     History of seizures     History of type C viral hepatitis     HTN (hypertension)     Hyperlipidemia     Impaired mobility and activities of daily living     Mediastinal lymphadenopathy 2013    Dr Venus Ortez, Nury Lester    Metabolic syndrome     Moderate persistent asthma without complication 2/59/2477    Need for extended care facility 7/7/2021    Neurogenic urinary incontinence 2013    Neuropathy in diabetes [facility])     Nonrheumatic mitral valve regurgitation 7/7/2021    Nonrheumatic tricuspid valve regurgitation 7/7/2021    Obesity (BMI 30-39. 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 ([address]     Type 2 diabetes mellitus with renal manifestations, controlled ([address] 2015    Insulin dependent, Dr Blanchard Given    Uncontrolled type 2 diabetes mellitus with hyperglycemia ([address]     Urinary incontinence due to cognitive impairment 2013    Vitamin D deficiency 2014       Social History     Socioeconomic History    Marital status:      Spouse name: None    Number of children: 2    Years of education: None    Highest education level: None   Occupational History    Occupation: disabled   Tobacco Use    Smoking status: Never Smoker    Smokeless tobacco: Never Used   Vaping Use    Vaping Use: Never used   Substance and Sexual Activity    Alcohol use: No     Alcohol/week: 0.0 standard drinks    Drug use: No    Sexual activity: Not Currently   Other Topics Concern    None   Social History Narrative    Born in [place], one of 5    Twin sister Reyes, very ill in 2018, [place] 2019    Moved to [facility], , 2 children, one son and one daughter    Worked at [employer], as a nurse's aide    Disabled due to mental illness    Lived at [employer], was discharged, returned to independent living in 2017 in the daughter's house and has adjusted well    One son and one daughter, live in the same house with patient, Meme Horvath pays the rent    [employer] reading ([employer])        10/11/2021 [facility] updates; patient lives with her daughter son-in-law and 2 grandchildren and patient's handicapped son. Per daughter, her brother is blind, MRDD, multiple health issues. Daughter's  is patient's legal guardian. Patient has hemodialysis Tuesday Thursday and Saturday. Patient's bedroom is on main floor with a half bath. Daughter walks patient upstairs once weekly for full bath. Patient is using her walker in the home. Patient has a hospital bed in the home. Social Determinants of Health     Financial Resource Strain: Low Risk     Difficulty of Paying Living Expenses: Not hard at all   Food Insecurity: No Food Insecurity    Worried About Running Out of Food in the Last Year: Never true    Yung of Food in the Last Year: Never true   Transportation Needs: No Transportation Needs    Lack of Transportation (Medical): No    Lack of Transportation (Non-Medical):  No   Physical Activity: Sufficiently Active    Days of Exercise per Week: 3 days    Minutes of Exercise per Session: 60 min   Stress:     Feeling of Stress : Not on file   Social Connections:     Frequency of Communication with Friends and Family: Not on file    Frequency of Social Gatherings with Friends and Family: Not on file    Attends Church Services: Not on file    Active Member of [address] [place] [address] [employer] or Organizations: Not on file    Attends Club or Organization Meetings: Not on file    Marital Status: Not on file   Intimate Partner Violence:     Fear of Current or Ex-Partner: Not on file    Emotionally Abused: Not on file    Physically Abused: Not on file    Sexually Abused: Not on file   Housing Stability:     Unable to Pay for Housing in the Last Year: Not on file    Number of [place] in the Last Year: Not on file    Unstable Housing in the Last Year: Not on file       Allergies   Allergen Reactions    Codeine Hives     hives    Oxycontin [Oxycodone Hcl] Hives       No current facility-administered medications on file prior to encounter.      Current Outpatient Medications on File Prior to Encounter   Medication Sig Dispense Refill    ARIPiprazole (ABILIFY) 5 MG tablet TAKE 1 TABLET BY MOUTH ONCE DAILY 30 tablet 0    furosemide (LASIX) 20 MG tablet Take 1 tablet by mouth 2 times daily (Patient taking differently: Take 100 mg by mouth 2 times daily ) 60 tablet 0    NYAMYC 502337 UNIT/GM powder APPLY TO AFFECTED AREA OF ABDOMINAL FOLDS EVERY 12 HOURS AS NEEDED 60 g 5    insulin aspart (NOVOLOG FLEXPEN) 100 UNIT/ML injection pen 15 UNITS PLUS SLIDING SCALE   LESS THAN 180 NONE  181-250 2 UNITS  251-300 4 UNITS  301-400 6 UNITS   401-500 10 UNITS 10 pen 3    isosorbide mononitrate (IMDUR) 30 MG extended release tablet Take 4 tablets by mouth daily 30 tablet 3    atorvastatin (LIPITOR) 40 MG tablet TAKE 1 TABLET BY MOUTH DAILY (Patient taking differently: Take 40 mg by mouth nightly ) 30 tablet 12    magnesium oxide (MAG-OX) 400 MG tablet TAKE 1 TABLET BY MOUTH DAILY 30 tablet 12    psyllium (METAMUCIL SMOOTH TEXTURE) 58.6 % powder Take 2 teaspoon with 8 to 10 oz water. 425 g 2    loperamide (RA ANTI-DIARRHEAL) 2 MG capsule Take 1 capsule by mouth 4 times daily as needed for Diarrhea 120 capsule 1    potassium chloride (KLOR-CON M) 20 MEQ extended release tablet Take 2 tablets by mouth 2 times daily (with meals) 60 tablet 3    melatonin 10 MG CAPS capsule Take 1 capsule by mouth nightly 30 capsule 12    ondansetron (ZOFRAN) 4 MG tablet Take 1 tablet by mouth as needed for nausea/vomiting 3 times a day as needed 60 tablet 3    midodrine (PROAMATINE) 5 MG tablet Take 1 tablet by mouth one time a day every Tue, Thu, Sat for hypotension. Give 30 mins prior to dialysis. 90 tablet 3    Handicap Placard MISC by Does not apply route Expiration in 5 years. 1 each 0    sertraline (ZOLOFT) 50 MG tablet TAKE 1 TABLET BY MOUTH DAILY (Patient taking differently: Take 50 mg by mouth nightly ) 30 tablet 2    lidocaine-prilocaine (EMLA) 2.5-2.5 % cream Apply topically as needed for Pain Apply topically as needed.  3 times a week before dialysis wrap with saran wrap      albuterol (PROVENTIL) (2.5 MG/3ML) 0.083% nebulizer solution Take 3 mLs by nebulization every 4 hours as needed for Wheezing 120 each 3    hydrOXYzine (ATARAX) 25 MG tablet TAKE ONE TABLET BY MOUTH TWO TIMES A DAY      insulin glargine (LANTUS SOLOSTAR) 100 UNIT/ML injection pen 70 UNITS AT BEDTIME 30 pen 3    blood glucose test strips (ONETOUCH VERIO) strip  each 3    OneTouch Delica Lancets 78K MISC  each 3    Blood Glucose Monitoring Suppl (ONETOUCH VERIO) w/Device KIT AS DIRECTED 1 kit 0    pantoprazole (PROTONIX) 20 MG tablet TAKE 1 TABLET BY MOUTH DAILY 30 tablet 11    Insulin Pen Needle (SURE COMFORT PEN NEEDLES) 30G X 8 MM MISC USE AS DIRECTED FIVE TIMES A DAILY 100 each 10    b complex-C-folic acid (NEPHROCAPS) 1 MG capsule Take 1 capsule by mouth daily      LANTUS SOLOSTAR 100 UNIT/ML injection pen 55 units at bedtime 10 pen 10    metoprolol tartrate (LOPRESSOR) 25 MG tablet TAKE 1/2 TABLET BY MOUTH TWO TIMES DAILY  (Patient taking differently: Take 12.5 mg by mouth 2 times daily ) 90 tablet 4    insulin aspart (NOVOLOG FLEXPEN) 100 UNIT/ML injection pen INJECT 8-10  units with each meals 10 pen 3    aspirin EC 81 MG EC tablet Take 1 tablet by mouth daily 30 tablet 12    blood glucose test strips (FREESTYLE LITE) strip 1 each by Does not apply route 4 times daily (before meals and nightly) As needed. 200 strip 5    Alcohol Swabs (EASY TOUCH ALCOHOL PREP MEDIUM) 70 % PADS USE AS DIRECTED THREE TIMES A  each 3    FreeStyle Lancets MISC Test 4x daily 150 each 3    acetaminophen (TYLENOL) 325 MG tablet Take 2 tablets by mouth every 4 hours as needed for Pain (For mild to moderate pain (Pain 1-6 out of 10 on pain scale)) 120 tablet 0    Blood Glucose Monitoring Suppl (FREESTYLE LITE) CORETTA 1 Device by Does not apply route daily as needed (Diabetes) Use freestyle meter to test blood sugar as needed 1 Device 0    nitroGLYCERIN (NITROSTAT) 0.4 MG SL tablet Place 1 tablet under the tongue every 5 minutes as needed for Chest pain         Review of Systems   Constitutional: Negative. Negative for chills, diaphoresis and fever. HENT: Negative. Negative for congestion, ear pain, hearing loss and tinnitus. Eyes: Negative. Negative for photophobia. Respiratory: Negative. Negative for cough, shortness of breath and wheezing. Cardiovascular: Negative. Negative for chest pain, palpitations and leg swelling. Gastrointestinal: Negative. Negative for abdominal pain, constipation, diarrhea, nausea and vomiting. Genitourinary: Negative. Negative for dysuria, flank pain, frequency, hematuria and urgency. Musculoskeletal: Negative. Negative for back pain and neck pain. Skin: Negative. Negative for rash.    Allergic/Immunologic: Negative for environmental allergies. Neurological: Negative. Negative for dizziness, tremors, weakness and headaches. Hematological: Does not bruise/bleed easily. Psychiatric/Behavioral: Negative. Negative for hallucinations and suicidal ideas. The patient is not nervous/anxious. OBJECTIVE:  /66   Pulse 65   Temp 97.9 °F (36.6 °C) (Oral)   Resp 16   Ht 5' 6\" (1.676 m)   Wt 220 lb (99.8 kg)   LMP  (LMP Unknown)   SpO2 92%   BMI 35.51 kg/m²     Physical Exam  Constitutional:       General: She is not in acute distress. Appearance: She is well-developed. She is not diaphoretic. HENT:      Head: Normocephalic and atraumatic. Nose: Nose normal.      Mouth/Throat:      Pharynx: No oropharyngeal exudate. Eyes:      General: No scleral icterus. Right eye: No discharge. Left eye: No discharge. Conjunctiva/sclera: Conjunctivae normal.   Neck:      Thyroid: No thyromegaly. Vascular: No JVD. Trachea: No tracheal deviation. Cardiovascular:      Rate and Rhythm: Normal rate and regular rhythm. Heart sounds: Normal heart sounds. No murmur heard. No friction rub. No gallop. Pulmonary:      Effort: No respiratory distress. Breath sounds: No stridor. No wheezing or rales. Chest:      Chest wall: No tenderness. Abdominal:      General: Bowel sounds are normal. There is no distension. Palpations: Abdomen is soft. There is no mass. Tenderness: There is no abdominal tenderness. There is no guarding or rebound. Hernia: No hernia is present. Musculoskeletal:         General: No tenderness or deformity. Arms:       Cervical back: Neck supple. Comments: No thrill or pulse in dialysis graft. Skin:     General: Skin is dry. Coloration: Skin is not pale. Findings: No erythema or rash. Neurological:      Mental Status: She is alert and oriented to person, place, and time. Cranial Nerves: No cranial nerve deficit.    Psychiatric: Behavior: Behavior normal.         Thought Content: Thought content normal.         Judgment: Judgment normal.         Lab Results   Component Value Date    WBC 7.6 06/02/2022    HGB 10.2 06/02/2022    HCT 30.2 06/02/2022     06/02/2022    MCV 92.8 06/02/2022           ASSESSMENT ANDPLAN:    ASSESSMENT: Patient with completely thrombosed right arm dialysis graft and unable to get dialysis. She requires urgent dialysis access. PLAN: Since right arm graft is completely thrombosed, this will require extensive clot removal and thrombectomy, due to extensive clot burden, will need to protect the lungs likely with suction thrombectomy if possible through catheter from the groin to the right arm. We'll attempt AngioJet thrombectomy prior to push thrombectomy into the suction catheter. This will be planned for Monday, patient will have a tunneled permanent dialysis catheter placed today so she can have dialysis until attempted thrombectomy. If thrombectomy is unsuccessful, will need to planned for ultrasound dialysis access either through a peritoneal dialysis catheter or new fistula or graft.       Florian Mao MD, [facility]

## 2022-06-06 NOTE — CARE COORDINATION
This LSW met with patient at bedside this afternoon. Patient and I discussed discharge plans. Patient to return home and resume community dialysis /[facility] home care upon discharge. Patient denies needs. LSW / CM to follow.   Electronically signed by STEPHENIE Dobson, HIPOLITO on 6/6/22 at 1:41 PM EDT

## 2022-06-06 NOTE — PROGRESS NOTES
Discharge instructions provided to patient. Verbalized understanding of follow up appointments, diet, activity, medications and reasons to return to ED/call physician. All questions answered. Copy of discharge instructions provided. Assisted into wheelchair and taken to personal car. Denies further needs. IV removed without complication. Pt tolerated well. Catheter appears  intact, dressing applied. No drainage noted. Dressing on graft as well as dressing on vas cath clean dry and intact, soft, no bleeding noted.

## 2022-06-06 NOTE — OR NURSING
Sedation Administered    1017 Patient assisted to IR table in supine position. Consent verified for Fistulogram with intervention if needed. Open pea size abrasion noted to right lateral elbow. 1027 Dr. Vivi De Jesus present and speaking with patient. All questions answered before proceeding. Dr. Vivi De Jesus evaluated right arm fistula using Ultrasound guidance. Dr Vivi De Jesus assessed patient. 1037 Right arm cleaned generously with Chloroprep and patient draped using full body drape. 1050 Timeout performed,    1052 Dr. Vivi De Jesus administered Lidocaine 2 % at area of fistula to numb the skin. Fistula was accessed using Ultrasound guidance and a 5 fr MP set. 1053  Versed . 5 mg IV and Fentanyl 50 mcg IV sedation administered. 1055  A mixture of saline and contrast were injected and fluoro images taken to visualize flow of contrast through the fistula. 1058 6 Fr brite tip sheath placed  over Bentson (0.035 x 150 cm) wire. Patient tolerating procedure well. 1100 Chiang 5F (1.8mm) x 65 cm catheter inserted. 1101 Dr Vivi De Jesus hand injecting contrast.     1103 Bentson (0.035 x 150 cm) wire reinserted. 1105 Heparin 4000 units injected via IV.    1108 AngioJet Solent Proxi (90 cm x 6F)  being prepared for use and primed with sodium chloride 9% mixed with heparin 5000 units. 1110 ACT result: 141.     1113 Berenstein catheter removed. AngioJet Solent  Proxi placed over wire by Dr Vivi De Jesus and time commenced. 1115 AngioJet use paused and blood flow tested. 1116 Additional hand injection of contrast given by Dr Vivi De Jesus. 1125 Conquest 6 F PTA Dilation catheter ( 6mm x 40mm) used with indeflator for intervals of one minute inflation and then deflation. 1128 ACT result: 481    1129 Additional hand injection of contrast given. 1131 Additional Lidocaine 2% given to second site on right arm. 1132 Medial access acquired with micropuncture.     1133 Additional Versed 0.5 mg IV given     1134 Bentson wire inserted. Additional Fentanyl 100mcg IV given. 1135 6 Fr brite tip sheath placed over Bentson (0.035 x 150 cm) wire. Patient tolerating procedure well    [id number] going in now. 1137 [place] removed and exchanged for amplatz. [address] removed. AngioJet Solent  Proxi placed over wire by Dr Isabella Camacho and time commenced. 1143 AngioJet use paused and blood flow tested. 1143  Additional hand injection of contrast given by Dr Isabella Camacho. 1145 Conquest 6 F PTA Dilation catheter ( 6mm x 40mm) used with indeflator for intervals of one minute inflation and then deflation. 1156 Conquest 6 F PTA Dilation catheter ( 6mm x 40mm) used with indeflator for intervals of one minute inflation and then deflation in venous access. 1158 Additional hand contrast given via lateral site by Dr. Isabella Camacho.     1200 Conquest 6 F PTA Dilation catheter ( 7mm x 40mm) used with indeflator for intervals of one minute inflation and then deflation in medial access. 1208 Additional hand contrast given via arterial site by Dr. Isabella Camacho.     200 PTA catheter and Bentson wire removed. Final images taken. 1210 Additional lidocaine given by Dr Isabella Camacho to venous site. 1211 Purse string sutures placed to medial site by Dr. Isabella Camacho and double knotted. 1213 Sheath removed from medial site. , pressure held for 10 minutes. 1215 Purse string sutures placed by Dr. Isabella Camacho and double knotted. 1214 Additional lidocaine given by Dr Isabella Camacho to lateral site. 1215 Purse string sutures placed to lateral site by Dr. Isabella Camacho and double knotted. 1216 Sheath removed from medial site. , pressure held for 10 minutes. [address] and tegaderm dressing applied to medial and lateral sites. No bleeding noted. 1233 Procedure complete. Patient tolerated well, no ectopy on monitor. VSS. Patient denies complaints at this time. 1236 Left upper chest catheter site cleaned and sterile dressing applied.     12 [facility] Patient assisted

## 2022-06-06 NOTE — FLOWSHEET NOTE
1005 - Pt arrived to CT holding via cart from room #471. A&Ox3. Denies pain or SOB, calm and cooperative. Procedure explained and consent signed. Allergies and current MAR reviewed. VS obtained and stable, on RA. Pt in gown and no street clothes / jewelry. Confirmed NPO since midnight and pre-pretty score completed. Dr. Jose Martin Brody contacted that patient ready to be seen - no need to see in holding, instructed to take straight to lab. 1012 - Pt taken to cath lab 2 via cart.  Electronically signed by Uzma Anaya RN on 6/6/2022 at 10:40 AM

## 2022-06-06 NOTE — PROGRESS NOTES
Pt assessment and vitals complete and stable. Pt npo at this time for procedure with Dr. Leann Ledesma. Denies needs. Voices understanding of plan of care.

## 2022-06-06 NOTE — PROGRESS NOTES
Nephrology Progress Note    Assessment:  Non functioning right arm graft  ESRDX  DM type-2  OHDx CAD  Anemia  Obese    Plan: intervention today with graft  Dialysis afterwards    Patient Active Problem List:     Atherosclerotic heart disease of native coronary artery with unspecified angina pectoris (HCC)     Schizophrenia, paranoid, chronic     Metabolic syndrome     Vitamin B 12 deficiency     Cerebral microvascular disease     Mixed hyperlipidemia     Other hammer toe (acquired)     Vitamin D insufficiency     Incontinence of urine     Diabetic nephropathy with proteinuria ([address]     Essential (primary) hypertension     History of type C viral hepatitis     Urinary incontinence due to cognitive impairment     History of seizures     Stented coronary artery-plan is to stay on Plavix indefinately per Dr Juanita Desai     Other specified diabetes mellitus with diabetic neuropathy, unspecified ([address]     Controlled type 2 diabetes mellitus with diabetic neuropathy, with long-term current use of insulin (HCC)     Hemiparesis, left (HCC)     Angina, class II ([address]     Pain, unspecified     Tardive dyskinesia     Shortness of breath     Chronic diastolic congestive heart failure (HCC)     Sleep apnea, unspecified     Pulmonary hypertension, unspecified (HCC)     Class 2 severe obesity with serious comorbidity and body mass index (BMI) of 36.0 to 36.9 in adult [facility])     Edema     Closed supracondylar fracture of right humerus     Other chronic pain     Palliative care patient     Recurrent falls     Renal failure     Difficulty in walking     ESRD (end stage renal disease) on dialysis ([address]     Weakness     Other seizures (HCC)     Moderate persistent asthma without complication     [id number]     Post PTCA     Falls frequently     Chest wall contusion, left, initial encounter     Headache, unspecified     Paranoid schizophrenia ([address]     Compression fracture of spine ([address]     Closed rib fracture     Depression     Chronic obstructive pulmonary disease (HCC)     Critical illness polyneuropathy (HCC)     Multiple closed fractures of ribs of right side     Nonrheumatic mitral valve regurgitation     Nonrheumatic tricuspid valve regurgitation     Need for extended care facility     Chronic pain of both shoulders     Hemodialysis-associated hypotension     Anginal chest pain at rest [facility])     Chest pain     Unstable angina (HCC)     NSTEMI (non-ST elevated myocardial infarction) ([facility])     CKD (chronic kidney disease) stage 4, GFR 15-29 ml/min ([facility])     Hyperkalemia      Subjective:  Admit Date: 6/2/2022    Interval History: stable    Medications:  Scheduled Meds:   heparin (porcine)  2,000 Units IntraVENous Once    insulin lispro  0-12 Units SubCUTAneous TID WC    insulin lispro  0-6 Units SubCUTAneous Nightly    metoprolol tartrate  25 mg Oral Daily    insulin glargine  30 Units SubCUTAneous Nightly    aspirin  81 mg Oral Daily    b complex-C-folic acid  1 capsule Oral Daily    sertraline  50 mg Oral Daily    atorvastatin  40 mg Oral Nightly    midodrine  5 mg Oral Once per day on Mon Wed Fri    ARIPiprazole  5 mg Oral Daily    furosemide  100 mg Oral BID    pantoprazole  20 mg Oral Daily    miconazole   Topical BID     Continuous Infusions:    CBC: No results for input(s): WBC, HGB, PLT in the last 72 hours. CMP:  No results for input(s): NA, K, CL, CO2, BUN, CREATININE, GLUCOSE, CALCIUM, LABGLOM in the last 72 hours. Troponin: No results for input(s): TROPONINI in the last 72 hours. BNP: No results for input(s): BNP in the last 72 hours. INR: No results for input(s): INR in the last 72 hours. Lipids: No results for input(s): CHOL, LDLDIRECT, TRIG, HDL, AMYLASE, LIPASE in the last 72 hours. Liver: No results for input(s): AST, ALT, ALKPHOS, PROT, LABALBU, BILITOT in the last 72 hours. Invalid input(s): BILDIR  Iron:  No results for input(s): IRONS, FERRITIN in the last 72 hours.     Invalid input(s): LABIRONS  Urinalysis: No results for input(s): UA in the last 72 hours.     Objective:  Vitals: /66   Pulse 65   Temp 97.9 °F (36.6 °C)   Resp 16   Ht 5' 6\" (1.676 m)   Wt 220 lb (99.8 kg)   LMP  (LMP Unknown)   SpO2 92%   BMI 35.51 kg/m²    Wt Readings from Last 3 Encounters:   06/03/22 220 lb (99.8 kg)   05/25/22 217 lb (98.4 kg)   04/25/22 221 lb (100.2 kg)      24HR INTAKE/OUTPUT:      Intake/Output Summary (Last 24 hours) at 6/6/2022 3243  Last data filed at 6/5/2022 2306  Gross per 24 hour   Intake 240 ml   Output --   Net 240 ml       General: alert, in no apparent distress  HEENT: normocephalic, atraumatic, anicteric  Neck: supple, no mass  Lungs: non-labored respirations, clear to auscultation bilaterally  Heart: regular rate and rhythm, no murmurs or rubs  Abdomen: soft, non-tender, non-distended  Ext: no cyanosis, no peripheral edema  Neuro: alert and oriented, no gross abnormalities  Psych: normal mood and affect  Skin: no rash      Electronically signed by Lilia Méndez DO, MD

## 2022-06-07 ENCOUNTER — CARE COORDINATION (OUTPATIENT)
Dept: CASE MANAGEMENT | Age: 62
End: 2022-06-07

## 2022-06-07 NOTE — PROGRESS NOTES
Vascular and Interventional Radiology   Derrick Barba. Nichol Bonilla M.D. [facility]      Chief Complaint: Thrombosed right arm graft, referred to hospital for dialysis access     Subjective: Nolberto Abbott is a 57 y.o. female with a thrombosed right arm dialysis graft, sent to hospital by dialysis center for dialysis access. Had left IJ tunneled HD CVC placed Friday. Having right arm graft declot and revision today. She has no complaints or discomfort. Objective:   BP (!) 175/84   Pulse 65   Resp 12   LMP  (LMP Unknown)   SpO2 98%     Physical:   not in acute distress    Normocephalic, without obvious abnormality, atraumatic    Neck supple. No adenopathy. Thyroid symmetric, normal size, and without nodularity    normal rate, regular rhythm, no murmurs and no gallops    chest clear, no wheezing, rales, normal symmetric air entry    Normal, without deformities, edema, or skin discoloration    Lab:  No results for input(s): WBC, RBC, HGB, HCT, MCV, MCH, MCHC, RDW, PLT, MPV in the last 72 hours. No results for input(s): INR, PROTIME in the last 72 hours. No results for input(s): CREATININE in the last 72 hours. Assessment: Nolberto Abbott is a 57 y.o. female with end stage kidney disease on dialysis with a thrombosed right arm graft, post tunneled left IJ HD CVC. Plan: Percutaneous thrombectomy of dialysis graft with possible balloon angioplasty or stent placement in stenosis. If can not open graft, then she will receive dialysis through catheter and consider new access placement. Derrick Barba.  Nichol Bonilla M.D. [facility]

## 2022-06-07 NOTE — CARE COORDINATION
Received message from Roselyn Cuello at [facility]. Per Roselyn Cuello, she did not receive [facility] when patient was d/c'd 6/6/22. Perfect serve message sent to Attending, requesting orders be faxed to [phone].

## 2022-06-07 NOTE — CARE COORDINATION
[facility] Transitions Initial Follow Up Call    Call within 2 business days of discharge: Yes    Patient: Curry Esquivel Patient : 1960   MRN: [id number]  Reason for Admission: hyperkalemia, TUNNELED HD CATH INSERTION 6/3/22  Discharge Date: 22 RARS: Readmission Risk Score: 18.9 ( )      Last Discharge [facility]       Complaint Diagnosis Description Type Department Provider    22 Vascular Access Problem Acute systolic congestive heart failure ([facility] . .. ED to Hosp-Admission (Discharged) (ADMITTED) [facility] Ferd Bosworth, DO; Eleazar Gonzalez,. ..        -First attempt to reach the patient for initial Care Transition call post hospital discharge. Message left with CTN's contact information requesting return phone call. Non-face-to-face services provided:  Communication with home health agencies or other community services the patient is currently using-spoke with Veronica Godwin at [facility] who states agency is aware patient is home, are just awaiting orders and then will resume care.         Follow Up  Future Appointments   Date Time Provider Aminata Cortes   2022  1:15 PM Kenny Kerr  [facility]   2022  2:30 PM Keven Farah MD [address] [place] [address]

## 2022-06-08 ENCOUNTER — TELEPHONE (OUTPATIENT)
Dept: PHARMACY | Facility: CLINIC | Age: 62
End: 2022-06-08

## 2022-06-08 ENCOUNTER — CARE COORDINATION (OUTPATIENT)
Dept: CASE MANAGEMENT | Age: 62
End: 2022-06-08

## 2022-06-08 NOTE — CARE COORDINATION
[facility] Transitions Initial Follow Up Call    Call within 2 business days of discharge: Yes    Patient: John Garcia Patient : 1960   MRN: [id number]  Reason for Admission: hyperkalemia, TUNNELED HD CATH INSERTION 6/3/22  Discharge Date: 22 RARS: Readmission Risk Score: 18.9 ( )      Last Discharge [facility]       Complaint Diagnosis Description Type Department Provider    22 Vascular Access Problem Acute systolic congestive heart failure ([facility] . .. ED to Hosp-Admission (Discharged) (ADMITTED) [facility] Isabel Guzman, DO; Jose Curry,... Transitions of Care Initial Call        Challenges to be reviewed by the provider   Additional needs identified to be addressed with provider: Yes  home health care-[facility] with [facility]  medications-patient would like zofran for N&V  PT-SARABJIT outpatient with [facility]             Method of communication with provider : chart routing high importance    Advance Care Planning:   Does patient have an Advance Directive: decision maker reviewed and current. Care Transition Nurse contacted the patient by telephone to perform post hospital discharge assessment. Verified name and  with patient as identifiers. Provided introduction to self, and explanation of the CTN role. CTN reviewed discharge instructions, medical action plan and red flags with patient who verbalized understanding. Patient given an opportunity to ask questions and does not have any further questions or concerns at this time. Were discharge instructions available to patient? Yes. Reviewed appropriate site of care based on symptoms and resources available to patient including: PCP  Specialist  Home health. The patient agrees to contact the PCP office for questions related to their healthcare. Medication reconciliation was not performed with patient.     Patient is agreeable to a call from [facility] pharmacy to set up a telephone appointment to review medications-CTN will route to [facility] pharmacy. No new medications as per AVS.  1111F not entered. Was patient discharged with a pulse oximeter? no    CTN provided contact information. Plan for follow-up call in 5-7 days based on severity of symptoms and risk factors. Plan for next call: symptom management-N&V?  follow up appointment-review PCP visit  referrals-ensure [facility] pharmacy telephone appointment completed. HHC and OP PT resumed? Non-face-to-face services provided:  Scheduled appointment with PCP-6/10/22  Scheduled appointment with Specialist-as below  Obtained and reviewed discharge summary and/or continuity of care documents  Establishment or re-establishment of referrals-to send to [address] [place] [address] 24 Hour Call    Do you have a copy of your discharge instructions?: Yes  Do you have all of your prescriptions and are they filled?: Yes  Have you been contacted by a [facility] Pharmacist?: No  Have you scheduled your follow up appointment?: Yes  How are you going to get to your appointment?: Public transportation (Comment: takes bus)  Do you feel like you have everything you need to keep you well at home?: Yes  Care Transitions Interventions    Pharmacist: Completed         -Spoke with patient for initial care transition call post hospital discharge.   -Patient reports attended dialysis yesterday(6/7/22) and no issues with dialysis access.  -Patient admits to some nausea occurring after dialysis which has persisted and states she had an episode of emesis while trying to eat her oatmeal this am.  Zofran noted to be stopped at discharge, patient states this medication has helped her in the past and is requesting reorder-CTN will route to PCP. -Patient requesting SARABJIT for [address] and OP PT, would like by this Friday(6/10/22)-CTN will route to PCP. -Patient denies any further needs, questions, or concerns at this time and is agreeable to continued follow up from CTN.     Follow Up  Future Appointments   Date Time Provider Aminata Cortes   6/10/2022  3:30 PM Candie Berrios MD [address]   6/29/2022  1:15 PM Marilynn Fletcher MD [facility]   7/13/2022  2:30 PM Mali Pelayo MD [facility]

## 2022-06-08 NOTE — TELEPHONE ENCOUNTER
----- Message from Kaykay Carcamo RN sent at 6/8/2022  9:46 AM EDT -----    Patient is agreeable to a call from [facility] pharmacy to set up a telephone appointment to review medications. Polypharmacy, CHF/COPD/ESRD/DM. Thank You.

## 2022-06-08 NOTE — TELEPHONE ENCOUNTER
Received a referral:  from Care Coordinator to review patients medications. Called patient to schedule a time to speak with a pharmacist over the telephone. No answer left VM: Please contact us at  [phone] to schedule this appointment. Pharmacists are available Monday thru Friday 7:30 AM till 5:30 PM.      Ute Jimenez CP.    [facility] free: [phone]

## 2022-06-08 NOTE — LETTER
[place]  [address], [address]        Azam Gee   [address]           06/13/22     Dear Aazm Gee,    You are eligible for a complete medication therapy review performed by a [address] [place] [address],[address] licensed clinical pharmacist. This review helps ensure that you are getting the most benefit from the medications you receive and includes the following:  - Review of your medications, including over-the-counter and herbal medications. - Answering questions about your medications and how to get the most benefit from them. - Identifying and helping to prevent potential drug interactions or side effects.  - Possibly identifying less costly alternatives that are equally effective. Under this program, 115 network disks will work with you and your doctor to manage your drug therapy. Please contact the [address] [place] [address] office to set up a time for your medication review with one of our clinical pharmacists. To contact us call [phone] and select Option 2 . This will be a phone consult and therefore will not require a trip to the medical office. Please note: This is an optional program.  It is a free service provided to help ensure that your medicines are safe, necessary, and effective. Your participation is encouraged, but not required.     Sincerely,  [address] free: [phone]

## 2022-06-09 ENCOUNTER — TELEPHONE (OUTPATIENT)
Dept: FAMILY MEDICINE CLINIC | Age: 62
End: 2022-06-09

## 2022-06-09 NOTE — TELEPHONE ENCOUNTER
----- Message from Tory Stahl sent at 6/9/2022  2:14 PM EDT -----  Subject: Message to Provider    QUESTIONS  Information for Provider? Patient needs a review for her out patient   therapy. Needs to be sent to [facility], their # [phone]   to call them. Needs by tomorrow please send ASAP. Therapy tomorrow. Please   send by 5 today.  ---------------------------------------------------------------------------  --------------  CALL BACK INFO  What is the best way for the office to contact you? OK to leave message on   voicemail  Preferred Call Back Phone Number? [phone]  ---------------------------------------------------------------------------  --------------  SCRIPT ANSWERS  Relationship to Patient?  Self

## 2022-06-10 ENCOUNTER — TELEPHONE (OUTPATIENT)
Dept: FAMILY MEDICINE CLINIC | Age: 62
End: 2022-06-10

## 2022-06-13 ENCOUNTER — TELEPHONE (OUTPATIENT)
Dept: FAMILY MEDICINE CLINIC | Age: 62
End: 2022-06-13

## 2022-06-13 NOTE — TELEPHONE ENCOUNTER
2nd Attempt Documentation:  2nd attempt to contact this patient regarding the previous message  CLINICAL PHARMACY:REFERRAL  Patient unavailable at the time of call. Left following message on home TAD: please call back at toll-free [phone] to retrieve previous message. Letter mailed to patient.     For Pharmacy Admin Tracking Only     CPA in place:  No   Gap Closed?: No    Time Spent (min): 15

## 2022-06-13 NOTE — TELEPHONE ENCOUNTER
Waiting on [facility] PT notes to see if we are continuing PT. Patient needs to be seen before we do a continuation of care for patient. Dr. Rogerio Juan has signed [place] [facility] papers for resumption, not continuation. She will sign continuation when patient is seen. Patient has canceled hospital follow up that I scheduled 6/10. And she voluntarily left an appointment 5/13, because we had an emergency and patient was given the option to stay and wait or leave and reschedule.

## 2022-06-13 NOTE — TELEPHONE ENCOUNTER
Patient called in requesting a call back. She states she needs ppw faxed over to [facility] and [address] [place] [address].      Please advise and thank you

## 2022-06-15 ENCOUNTER — CARE COORDINATION (OUTPATIENT)
Dept: CASE MANAGEMENT | Age: 62
End: 2022-06-15

## 2022-06-15 NOTE — CARE COORDINATION
[facility] Transitions Follow Up Call    6/15/2022    Patient: Dewayne Ibarra  Patient : 1960   MRN: [id number]  Reason for Admission: hyperkalemia, TUNNELED HD CATH INSERTION 6/3/22  Discharge Date: 22 RARS: Readmission Risk Score: 18.9 ( )    -First attempt to reach the patient for subsequent Care Transition call. Message left with CTN's contact information requesting return phone call.           Follow Up  Future Appointments   Date Time Provider Aminata Cortes   2022  1:15 PM [place] [id number]   2022  2:30 PM Kendal Man  [address]   2022  2:30 PM Cheryle Padron MD [address] [place] [address]

## 2022-06-17 ENCOUNTER — CARE COORDINATION (OUTPATIENT)
Dept: CASE MANAGEMENT | Age: 62
End: 2022-06-17

## 2022-06-17 RX ORDER — FUROSEMIDE 20 MG/1
TABLET ORAL
Qty: 60 TABLET | Refills: 10 | OUTPATIENT
Start: 2022-06-17

## 2022-06-22 ENCOUNTER — HOSPITAL ENCOUNTER (EMERGENCY)
Age: 62
Discharge: HOME | End: 2022-06-22
Attending: EMERGENCY MEDICINE
Payer: MEDICARE

## 2022-06-22 ENCOUNTER — TELEPHONE (OUTPATIENT)
Dept: OTHER | Facility: CLINIC | Age: 62
End: 2022-06-22

## 2022-06-22 ENCOUNTER — APPOINTMENT (OUTPATIENT)
Dept: GENERAL RADIOLOGY | Age: 62
End: 2022-06-22
Payer: MEDICARE

## 2022-06-22 ENCOUNTER — TELEPHONE (OUTPATIENT)
Dept: FAMILY MEDICINE CLINIC | Age: 62
End: 2022-06-22

## 2022-06-22 VITALS
HEIGHT: 67 IN | HEART RATE: 63 BPM | SYSTOLIC BLOOD PRESSURE: 126 MMHG | WEIGHT: 217 LBS | DIASTOLIC BLOOD PRESSURE: 60 MMHG | BODY MASS INDEX: 34.06 KG/M2 | TEMPERATURE: 97.9 F | OXYGEN SATURATION: 95 % | RESPIRATION RATE: 16 BRPM

## 2022-06-22 DIAGNOSIS — R07.9 CHEST PAIN, UNSPECIFIED TYPE: Primary | ICD-10-CM

## 2022-06-22 DIAGNOSIS — S39.012A BACK STRAIN, INITIAL ENCOUNTER: ICD-10-CM

## 2022-06-22 LAB
ALBUMIN SERPL-MCNC: 4.1 G/DL (ref 3.5–4.6)
ALP BLD-CCNC: 124 U/L (ref 40–130)
ALT SERPL-CCNC: 11 U/L (ref 0–33)
ANION GAP SERPL CALCULATED.3IONS-SCNC: 13 MEQ/L (ref 9–15)
AST SERPL-CCNC: 14 U/L (ref 0–35)
BASOPHILS ABSOLUTE: 0.1 K/UL (ref 0–0.2)
BASOPHILS RELATIVE PERCENT: 0.7 %
BILIRUB SERPL-MCNC: 1.2 MG/DL (ref 0.2–0.7)
BUN BLDV-MCNC: 13 MG/DL (ref 8–23)
CALCIUM SERPL-MCNC: 9.3 MG/DL (ref 8.5–9.9)
CHLORIDE BLD-SCNC: 90 MEQ/L (ref 95–107)
CO2: 32 MEQ/L (ref 20–31)
CREAT SERPL-MCNC: 4.14 MG/DL (ref 0.5–0.9)
EOSINOPHILS ABSOLUTE: 0.3 K/UL (ref 0–0.7)
EOSINOPHILS RELATIVE PERCENT: 4.3 %
GFR AFRICAN AMERICAN: 13.1
GFR NON-AFRICAN AMERICAN: 10.8
GLOBULIN: 2.7 G/DL (ref 2.3–3.5)
GLUCOSE BLD-MCNC: 158 MG/DL (ref 70–99)
HCT VFR BLD CALC: 29.8 % (ref 37–47)
HEMOGLOBIN: 10.3 G/DL (ref 12–16)
INR BLD: 1.2
LYMPHOCYTES ABSOLUTE: 0.8 K/UL (ref 1–4.8)
LYMPHOCYTES RELATIVE PERCENT: 10 %
MCH RBC QN AUTO: 32 PG (ref 27–31.3)
MCHC RBC AUTO-ENTMCNC: 34.6 % (ref 33–37)
MCV RBC AUTO: 92.6 FL (ref 82–100)
MONOCYTES ABSOLUTE: 0.6 K/UL (ref 0.2–0.8)
MONOCYTES RELATIVE PERCENT: 7.7 %
NEUTROPHILS ABSOLUTE: 5.8 K/UL (ref 1.4–6.5)
NEUTROPHILS RELATIVE PERCENT: 77.3 %
PDW BLD-RTO: 15.8 % (ref 11.5–14.5)
PLATELET # BLD: 146 K/UL (ref 130–400)
POTASSIUM SERPL-SCNC: 3.6 MEQ/L (ref 3.4–4.9)
PROTHROMBIN TIME: 15.6 SEC (ref 12.3–14.9)
RBC # BLD: 3.22 M/UL (ref 4.2–5.4)
SODIUM BLD-SCNC: 135 MEQ/L (ref 135–144)
TOTAL PROTEIN: 6.8 G/DL (ref 6.3–8)
TROPONIN: 0.04 NG/ML (ref 0–0.01)
WBC # BLD: 7.5 K/UL (ref 4.8–10.8)

## 2022-06-22 PROCEDURE — 99285 EMERGENCY DEPT VISIT HI MDM: CPT

## 2022-06-22 PROCEDURE — 93005 ELECTROCARDIOGRAM TRACING: CPT | Performed by: EMERGENCY MEDICINE

## 2022-06-22 PROCEDURE — 85025 COMPLETE CBC W/AUTO DIFF WBC: CPT

## 2022-06-22 PROCEDURE — 71101 X-RAY EXAM UNILAT RIBS/CHEST: CPT

## 2022-06-22 PROCEDURE — 84484 ASSAY OF TROPONIN QUANT: CPT

## 2022-06-22 PROCEDURE — 80053 COMPREHEN METABOLIC PANEL: CPT

## 2022-06-22 PROCEDURE — 85610 PROTHROMBIN TIME: CPT

## 2022-06-22 PROCEDURE — 6370000000 HC RX 637 (ALT 250 FOR IP): Performed by: EMERGENCY MEDICINE

## 2022-06-22 PROCEDURE — 36415 COLL VENOUS BLD VENIPUNCTURE: CPT

## 2022-06-22 RX ORDER — TRAMADOL HYDROCHLORIDE 50 MG/1
50 TABLET ORAL ONCE
Status: COMPLETED | OUTPATIENT
Start: 2022-06-22 | End: 2022-06-22

## 2022-06-22 RX ORDER — NITROGLYCERIN 0.4 MG/1
TABLET SUBLINGUAL
Qty: 25 TABLET | Refills: 3 | Status: SHIPPED | OUTPATIENT
Start: 2022-06-22 | End: 2022-08-16 | Stop reason: SDUPTHER

## 2022-06-22 RX ORDER — TRAMADOL HYDROCHLORIDE 50 MG/1
50 TABLET ORAL EVERY 6 HOURS PRN
Qty: 12 TABLET | Refills: 0 | Status: SHIPPED | OUTPATIENT
Start: 2022-06-22 | End: 2022-06-25

## 2022-06-22 RX ADMIN — TRAMADOL HYDROCHLORIDE 50 MG: 50 TABLET, COATED ORAL at 10:16

## 2022-06-22 ASSESSMENT — ENCOUNTER SYMPTOMS
ABDOMINAL PAIN: 0
BACK PAIN: 1
CHEST TIGHTNESS: 0
SHORTNESS OF BREATH: 0
EYE PAIN: 0
VOMITING: 0
SORE THROAT: 0
NAUSEA: 0

## 2022-06-22 ASSESSMENT — PAIN DESCRIPTION - INTENSITY: RATING_2: 8

## 2022-06-22 ASSESSMENT — PAIN DESCRIPTION - DESCRIPTORS
DESCRIPTORS: PRESSURE;STABBING
DESCRIPTORS_2: ACHING
DESCRIPTORS: STABBING

## 2022-06-22 ASSESSMENT — PAIN DESCRIPTION - PAIN TYPE: TYPE: ACUTE PAIN

## 2022-06-22 ASSESSMENT — PAIN DESCRIPTION - LOCATION
LOCATION: CHEST
LOCATION_2: BACK
LOCATION: CHEST;BACK

## 2022-06-22 ASSESSMENT — PAIN DESCRIPTION - FREQUENCY: FREQUENCY: CONTINUOUS

## 2022-06-22 ASSESSMENT — PAIN - FUNCTIONAL ASSESSMENT
PAIN_FUNCTIONAL_ASSESSMENT: 0-10
PAIN_FUNCTIONAL_ASSESSMENT: NONE - DENIES PAIN

## 2022-06-22 ASSESSMENT — PAIN DESCRIPTION - ORIENTATION: ORIENTATION_2: RIGHT;LOWER

## 2022-06-22 ASSESSMENT — PAIN DESCRIPTION - ONSET: ONSET: SUDDEN

## 2022-06-22 ASSESSMENT — PAIN SCALES - GENERAL
PAINLEVEL_OUTOF10: 7
PAINLEVEL_OUTOF10: 7

## 2022-06-22 NOTE — ED NOTES
Pt  Alert and oriented x3. Skin is warm, dry, and intact. Monitor pattern shows SR with 1st degree block and PAC's.       Sanjeev Ramey  06/22/22 1372

## 2022-06-22 NOTE — ED TRIAGE NOTES
Pt states she has left sided chest pain that started last night that is described as pressure and stabbing. Pt stated she fell two days ago injuring her right lower back, states it feels like a rib is fractured. Pt denies taking a blood thinner.

## 2022-06-22 NOTE — TELEPHONE ENCOUNTER
----- Message from Eran Conroy sent at 6/22/2022 10:04 AM EDT -----  Subject: Message to Provider    QUESTIONS  Information for Provider? Cecy with [facility] called in regards   to the pt, she stated she was told a form had been faxed over on Monday or   Tuesday, but they have not received it. Edrick Halsted stated the form should say   [facility] Request at the top. Please refax the form to   [phone]. Call Cecy with any questions.  ---------------------------------------------------------------------------  --------------  CALL BACK INFO  What is the best way for the office to contact you? OK to leave message on   voicemail  Preferred Call Back Phone Number? [phone]  ---------------------------------------------------------------------------  --------------  SCRIPT ANSWERS  Relationship to Patient? Third Party  Third Party Type? Other  Other Third Party Type? [employer]  Representative Name?  Edrick Halsted

## 2022-06-22 NOTE — ED PROVIDER NOTES
[facility] [facility] ED  EMERGENCY DEPARTMENT ENCOUNTER      Pt Name: Karla Cockayne  MRN: [id number]  [place] 1960  Date of evaluation: 6/22/2022  Provider: Janes Queen, [address] [place] [address]       Chief Complaint   Patient presents with    Chest Pain    Back Pain         HISTORY OF PRESENT ILLNESS   (Location/Symptom, Timing/Onset, Context/Setting, Quality, Duration, Modifying Factors, Severity)  Note limiting factors. Karla Cockayne is a 57 y.o. female who presents to the emergency department . Patient comes in with some left-sided chest pain. She gets that occasionally. This is similar. Patient had double bypass in January at [facility]. Patient also states a few days ago she fell and has some pain to the right lower back and ribs. She took Tylenol which did not help. She takes baby aspirin but no anticoagulants. Patient is on dialysis. HPI    Nursing Notes were reviewed. REVIEW OF SYSTEMS    (2-9 systems for level 4, 10 or more for level 5)     Review of Systems   Constitutional: Negative for activity change, appetite change and fatigue. HENT: Negative for congestion and sore throat. Eyes: Negative for pain and visual disturbance. Respiratory: Negative for chest tightness and shortness of breath. Cardiovascular: Positive for chest pain. Gastrointestinal: Negative for abdominal pain, nausea and vomiting. Endocrine: Negative for polydipsia. Genitourinary: Negative for flank pain and urgency. Musculoskeletal: Positive for back pain. Negative for gait problem and neck stiffness. Skin: Negative for rash. Neurological: Negative for weakness, light-headedness and headaches. Psychiatric/Behavioral: Negative for confusion and sleep disturbance. Except as noted above the remainder of the review of systems was reviewed and negative.        PAST MEDICAL HISTORY     Past Medical History:   Diagnosis Date    Angina at rest [facility]) 5/24/2020    Anxiety  CAD S/P percutaneous coronary angioplasty 2015, 2018    stents per dr Millicent Burger    CHF (congestive heart failure) ([address]     CKD (chronic kidney disease) stage 4, GFR 15-29 ml/min ([address] 2/24/2018    CKD stage 4 due to type 2 diabetes mellitus ([address]     Contusion of right chest wall 2/16/2021    COPD (chronic obstructive pulmonary disease) ([address]     Diabetic nephropathy with proteinuria ([address] 2014    DJD (degenerative joint disease) of knee     Dr Cheryle Dietrich GERD (gastroesophageal reflux disease)     Hemiparesis, left ([address] 2013    entered Assisted Living ([facility])    Hemodialysis patient [facility])     Hemodialysis-associated hypotension 10/22/2021    History of heart failure     History of seizures     History of type C viral hepatitis     HTN (hypertension)     Hyperlipidemia     Impaired mobility and activities of daily living     [facility]    Metabolic syndrome     Moderate persistent asthma without complication 0/07/9136    Need for extended care facility 7/7/2021    Neurogenic urinary incontinence 2013    Neuropathy in diabetes [facility])     Nonrheumatic mitral valve regurgitation 7/7/2021    Nonrheumatic tricuspid valve regurgitation 7/7/2021    Obesity (BMI 30-39. 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 Natalie Naqvi      x1 Dr. Anup Schulte, Dr Hamilton Close  08/10/2021    [facility]    DIAGNOSTIC CARDIAC CATH LAB PROCEDURE  10/02/2019    DIALYSIS CATHETER INSERTION Left 2022    Tunneled Symetrex 15.5F x 23cm hemodialysis catheter inserted by Dr. Jarrett Vergara AVF  2022    IR THROMBECTOMY PERCUT AVF 2022 [facility] PROCEDURE    IR TUNNELED CATHETER PLACEMENT GREATER THAN 5 YEARS  6/3/2022    IR TUNNELED CATHETER PLACEMENT GREATER THAN 5 YEARS 6/3/2022 [facility] PROCED[facility]    SC TOTAL KNEE ARTHROPLASTY Left 2018    LEFT KNEE TOTAL KNEE ARTHROPLASTY, SHAYNA, NERVE BLOCK performed by Leny Reyes MD at [address] [place] [address] [facility]      TOE AMPUTATION Right     TOTAL ABDOMINAL HYSTERECTOMY      one ovary intact, Dr Tiffanie Subramanian, menorrhagia    TOTAL KNEE ARTHROPLASTY  2016    Dr Manuela Koch TUNNELED [address] Right 2020    tunneled HD catheter per Dr Bateman [place]       Previous Medications    ACETAMINOPHEN (TYLENOL) 325 MG TABLET    Take 2 tablets by mouth every 4 hours as needed for Pain (For mild to moderate pain (Pain 1-6 out of 10 on pain scale))    ALBUTEROL (PROVENTIL) (2.5 MG/3ML) 0.083% NEBULIZER SOLUTION    Take 3 mLs by nebulization every 4 hours as needed for Wheezing    ALCOHOL SWABS (EASY TOUCH ALCOHOL PREP MEDIUM) 70 % PADS    USE AS DIRECTED THREE TIMES A DAY    ARIPIPRAZOLE (ABILIFY) 5 MG TABLET    TAKE 1 TABLET BY MOUTH ONCE DAILY    ASPIRIN EC 81 MG EC TABLET    Take 1 tablet by mouth daily    ATORVASTATIN (LIPITOR) 40 MG TABLET    TAKE 1 TABLET BY MOUTH DAILY    B COMPLEX-C-FOLIC ACID (NEPHROCAPS) 1 MG CAPSULE    Take 1 capsule by mouth daily    BLOOD GLUCOSE MONITORING SUPPL (FREESTYLE LITE) CORETTA    1 Device by Does not apply route daily as needed (Diabetes) Use freestyle meter to test blood sugar as needed    BLOOD GLUCOSE MONITORING SUPPL (ONETOUCH VERIO) W/DEVICE KIT    AS DIRECTED    BLOOD GLUCOSE TEST STRIPS (FREESTYLE LITE) STRIP    1 each by Does not apply route 4 times daily (before meals and nightly) As needed. BLOOD GLUCOSE TEST STRIPS (ONETOUCH VERIO) STRIP    QID    FREESTYLE LANCETS MISC    Test 4x daily    FUROSEMIDE (LASIX) 20 MG TABLET    Take 1 tablet by mouth 2 times daily    HANDICAP PLACARD MISC    by Does not apply route Expiration in 5 years. HYDROXYZINE (ATARAX) 25 MG TABLET    TAKE ONE TABLET BY MOUTH TWO TIMES A DAY    INSULIN ASPART (NOVOLOG FLEXPEN) 100 UNIT/ML INJECTION PEN    INJECT 8-10  units with each meals    INSULIN ASPART (NOVOLOG FLEXPEN) 100 UNIT/ML INJECTION PEN    15 UNITS PLUS SLIDING SCALE   LESS THAN 180 NONE  181-250 2 UNITS  251-300 4 UNITS  301-400 6 UNITS   401-500 10 UNITS    INSULIN GLARGINE (LANTUS SOLOSTAR) 100 UNIT/ML INJECTION PEN    70 UNITS AT BEDTIME    INSULIN PEN NEEDLE (SURE COMFORT PEN NEEDLES) 30G X 8 MM MISC    USE AS DIRECTED FIVE TIMES A DAILY    ISOSORBIDE MONONITRATE (IMDUR) 30 MG EXTENDED RELEASE TABLET    Take 4 tablets by mouth daily    LANTUS SOLOSTAR 100 UNIT/ML INJECTION PEN    55 units at bedtime    LIDOCAINE-PRILOCAINE (EMLA) 2.5-2.5 % CREAM    Apply topically as needed for Pain Apply topically as needed. 3 times a week before dialysis wrap with saran wrap    MAGNESIUM OXIDE (MAG-OX) 400 MG TABLET    TAKE 1 TABLET BY MOUTH DAILY    MELATONIN 10 MG CAPS CAPSULE    Take 1 capsule by mouth nightly    METOPROLOL TARTRATE (LOPRESSOR) 25 MG TABLET    TAKE 1/2 TABLET BY MOUTH TWO TIMES DAILY     MIDODRINE (PROAMATINE) 5 MG TABLET    Take 1 tablet by mouth one time a day every Tue, Thu, Sat for hypotension. Give 30 mins prior to dialysis.     NITROGLYCERIN (NITROSTAT) 0.4 MG SL TABLET    Place 1 tablet under the tongue every 5 minutes as needed for Chest pain [id number] [id number] UNIT/GM POWDER    APPLY TO AFFECTED AREA OF ABDOMINAL FOLDS EVERY 12 HOURS AS NEEDED    ONETOUCH DELICA LANCETS 32O MISC    QID    POTASSIUM CHLORIDE (KLOR-CON M) 20 MEQ EXTENDED RELEASE TABLET    Take 2 tablets by mouth 2 times daily (with meals)    SERTRALINE (ZOLOFT) 50 MG TABLET    TAKE 1 TABLET BY MOUTH DAILY       ALLERGIES     Codeine and Oxycontin [oxycodone hcl]    FAMILY HISTORY       Family History   Problem Relation Age of Onset    Cancer Mother 76        survived   [facility] Breast Cancer Mother     Hypertension Father     Diabetes Sister     Mental Illness Sister     Colon Cancer Neg Hx           SOCIAL HISTORY       Social History     Socioeconomic History    Marital status:      Spouse name: Not on file    Number of children: 2    Years of education: Not on file    Highest education level: Not on file   Occupational History    Occupation: disabled   Tobacco Use    Smoking status: Never Smoker    Smokeless tobacco: Never Used   Vaping Use    Vaping Use: Never used   Substance and Sexual Activity    Alcohol use: No     Alcohol/week: 0.0 standard drinks    Drug use: No    Sexual activity: Not Currently   Other Topics Concern    Not on file   Social History Narrative    Born in [place], one of 5    Twin sister Reyes, very ill in 2018, [place] 2019    Moved to [place] [facility], , 2 children, one son and one daughter    Worked at [employer], as a nurse's aide    Disabled due to mental illness    Lived at [facility], was discharged, returned to independent living in 2017 in the daughter's house and has adjusted well    One son and one daughter, live in the same house with patient, Jhon Caballero pays the rent    [employer] reading (misteries)        10/11/2021 [facility] updates; patient lives with her daughter son-in-law and 2 grandchildren and patient's handicapped son. Per daughter, her brother is blind, MRDD, multiple health issues.   Daughter's  is patient's legal guardian. Patient has hemodialysis Tuesday Thursday and Saturday. Patient's bedroom is on main floor with a half bath. Daughter walks patient upstairs once weekly for full bath. Patient is using her walker in the home. Patient has a hospital bed in the home. Social Determinants of Health     Financial Resource Strain: Low Risk     Difficulty of Paying Living Expenses: Not hard at all   Food Insecurity: No Food Insecurity    Worried About Running Out of Food in the Last Year: Never true    Yung of Food in the Last Year: Never true   Transportation Needs: No Transportation Needs    Lack of Transportation (Medical): No    Lack of Transportation (Non-Medical):  No   Physical Activity: Sufficiently Active    Days of Exercise per Week: 3 days    Minutes of Exercise per Session: 60 min   Stress:     Feeling of Stress : Not on file   Social Connections:     Frequency of Communication with Friends and Family: Not on file    Frequency of Social Gatherings with Friends and Family: Not on file    Attends Scientology Services: Not on file    Active Member of Clubs or Organizations: Not on file    Attends Club or Organization Meetings: Not on file    Marital Status: Not on file   Intimate Partner Violence:     Fear of Current or Ex-Partner: Not on file    Emotionally Abused: Not on file    Physically Abused: Not on file    Sexually Abused: Not on file   Housing Stability:     Unable to Pay for Housing in the Last Year: Not on file    Number of [place] in the Last Year: Not on file    Unstable Housing in the Last Year: Not on file       SCREENINGS        Michele Coma Scale  Eye Opening: Spontaneous  Best Verbal Response: Oriented  Best Motor Response: Obeys commands  [place] Coma Scale Score: 15               PHYSICAL EXAM    (up to 7 for level 4, 8 or more for level 5)     ED Triage Vitals [06/22/22 0916]   BP Temp Temp Source Heart Rate Resp SpO2 Height Weight   126/60 97.9 °F (36.6 °C) Oral 73 18 92 % 5' 7\" (1.702 m) 217 lb (98.4 kg)       Physical Exam  Vitals and nursing note reviewed. Constitutional:       General: She is not in acute distress. Appearance: She is well-developed. She is not ill-appearing or diaphoretic. HENT:      Head: Normocephalic and atraumatic. Right Ear: External ear normal.      Left Ear: External ear normal.      Nose: Nose normal.      Mouth/Throat:      Mouth: Mucous membranes are moist.      Pharynx: No oropharyngeal exudate. Eyes:      Conjunctiva/sclera: Conjunctivae normal.      Pupils: Pupils are equal, round, and reactive to light. Neck:      Thyroid: No thyromegaly. Vascular: No JVD. Trachea: No tracheal deviation. Cardiovascular:      Rate and Rhythm: Normal rate. Heart sounds: Normal heart sounds. No murmur heard. Pulmonary:      Effort: Pulmonary effort is normal. No respiratory distress. Breath sounds: Normal breath sounds. No wheezing. Abdominal:      General: Bowel sounds are normal.      Palpations: Abdomen is soft. Tenderness: There is no abdominal tenderness. There is no guarding. Musculoskeletal:         General: Tenderness present. Normal range of motion. Cervical back: Normal range of motion and neck supple. Comments: Right paraspinal musculature lumbar no bruising. No midline pain. Skin:     General: Skin is warm and dry. Findings: No rash. Neurological:      General: No focal deficit present. Mental Status: She is alert and oriented to person, place, and time. Cranial Nerves: No cranial nerve deficit.    Psychiatric:         Behavior: Behavior normal.         DIAGNOSTIC RESULTS     EKG: All EKG's are interpreted by the Emergency Department Physician who either signs or Co-signs this chart in the absence of a cardiologist.    Sinus rhythm with first-degree block 66 bpm right bundle branch block bifascicular block without ischemia    RADIOLOGY:   Non-plain film images such as CT, Ultrasound and MRI are read by the radiologist. Plain radiographic images are visualized and preliminarily interpreted by the emergency physician with the below findings:    Chest and right ribs no fractures    Interpretation per the Radiologist below, if available at the time of this note:    XR RIBS RIGHT INCLUDE CHEST (MIN 3 VIEWS)    (Results Pending)         ED BEDSIDE ULTRASOUND:   Performed by ED Physician - none    LABS:  Labs Reviewed   CBC WITH AUTO DIFFERENTIAL - Abnormal; Notable for the following components:       Result Value    RBC 3.22 (*)     Hemoglobin 10.3 (*)     Hematocrit 29.8 (*)     MCH 32.0 (*)     RDW 15.8 (*)     Lymphocytes Absolute 0.8 (*)     All other components within normal limits   COMPREHENSIVE METABOLIC PANEL - Abnormal; Notable for the following components:    Chloride 90 (*)     CO2 32 (*)     Glucose 158 (*)     CREATININE 4.14 (*)     GFR Non- 10.8 (*)     GFR  13.1 (*)     Total Bilirubin 1.2 (*)     All other components within normal limits   TROPONIN - Abnormal; Notable for the following components:    Troponin 0.038 (*)     All other components within normal limits    Narrative:     Laurenvigama Vivas tel. [id number],  Chemistry results called to and read back by pito, 06/22/2022 10:39, by  GABY   PROTIME-INR - Abnormal; Notable for the following components:    Protime 15.6 (*)     All other components within normal limits       All other labs were within normal range or not returned as of this dictation. EMERGENCY DEPARTMENT COURSE and DIFFERENTIAL DIAGNOSIS/MDM:   Vitals:    Vitals:    06/22/22 0916   BP: 126/60   Pulse: 73   Resp: 18   Temp: 97.9 °F (36.6 °C)   TempSrc: Oral   SpO2: 92%   Weight: 217 lb (98.4 kg)   Height: 5' 7\" (1.702 m)       Patient has some right lower back pain and rib pain. No fractures of the ribs. Musculoskeletal back pain.   I do not believe her chest pain is anginal.  Patient had double bypass in January. Troponin is elevated but patient has chronic renal failure and is on dialysis. MDM      REASSESSMENT          CRITICAL CARE TIME   Total Critical Care time was 0 minutes, excluding separately reportable procedures. There was a high probability of clinically significant/life threatening deterioration in the patient's condition which required my urgent intervention. CONSULTS:  None    PROCEDURES:  Unless otherwise noted below, none     Procedures        FINAL IMPRESSION      1. Chest pain, unspecified type    2. Back strain, initial encounter          DISPOSITION/PLAN   DISPOSITION Decision To Discharge 06/22/2022 10:49:26 AM      PATIENT REFERRED TO:  Bradley Burton MD  [address]  [phone]      next week      DISCHARGE MEDICATIONS:  New Prescriptions    TRAMADOL (ULTRAM) 50 MG TABLET    Take 1 tablet by mouth every 6 hours as needed for Pain for up to 3 days. Intended supply: 3 days. Take lowest dose possible to manage pain     Controlled Substances Monitoring:     RX Monitoring 11/18/2019   Attestation -   Acute Pain Prescriptions Prescription exceeds daily limit for a specific reason. See comments or note. Periodic Controlled Substance Monitoring No signs of potential drug abuse or diversion identified.        (Please note that portions of this note were completed with a voice recognition program.  Efforts were made to edit the dictations but occasionally words are mis-transcribed.)    Keke Fox DO (electronically signed)  Attending Emergency Physician           Keke Fox DO  06/22/22 3627

## 2022-06-23 LAB
EKG ATRIAL RATE: 66 BPM
EKG P AXIS: 61 DEGREES
EKG P-R INTERVAL: 238 MS
EKG Q-T INTERVAL: 482 MS
EKG QRS DURATION: 132 MS
EKG QTC CALCULATION (BAZETT): 505 MS
EKG R AXIS: -58 DEGREES
EKG T AXIS: 78 DEGREES
EKG VENTRICULAR RATE: 66 BPM

## 2022-06-23 PROCEDURE — 93010 ELECTROCARDIOGRAM REPORT: CPT | Performed by: INTERNAL MEDICINE

## 2022-06-24 NOTE — TELEPHONE ENCOUNTER
I have received it. On Monday I will have to see if  [facility] can sign it as soon as she can. If they call back please tell them I apologize that we had a half a day Wednesday and I was off yesterday. Thanks.

## 2022-06-27 LAB
EKG ATRIAL RATE: 65 BPM
EKG Q-T INTERVAL: 500 MS
EKG QRS DURATION: 130 MS
EKG QTC CALCULATION (BAZETT): 520 MS
EKG R AXIS: -65 DEGREES
EKG T AXIS: 93 DEGREES
EKG VENTRICULAR RATE: 65 BPM

## 2022-06-28 DIAGNOSIS — F33.1 MODERATE EPISODE OF RECURRENT MAJOR DEPRESSIVE DISORDER (HCC): ICD-10-CM

## 2022-06-28 RX ORDER — ARIPIPRAZOLE 5 MG/1
TABLET ORAL
Qty: 30 TABLET | Refills: 0 | Status: SHIPPED | OUTPATIENT
Start: 2022-06-28 | End: 2022-08-17 | Stop reason: SDUPTHER

## 2022-06-29 ENCOUNTER — TELEPHONE (OUTPATIENT)
Dept: FAMILY MEDICINE CLINIC | Age: 62
End: 2022-06-29

## 2022-06-29 ENCOUNTER — CARE COORDINATION (OUTPATIENT)
Dept: CARE COORDINATION | Age: 62
End: 2022-06-29

## 2022-06-29 DIAGNOSIS — G81.94 HEMIPARESIS, LEFT (HCC): ICD-10-CM

## 2022-06-29 DIAGNOSIS — R53.1 WEAKNESS: ICD-10-CM

## 2022-06-29 DIAGNOSIS — R26.2 DIFFICULTY IN WALKING: Primary | ICD-10-CM

## 2022-06-29 DIAGNOSIS — R29.6 RECURRENT FALLS: ICD-10-CM

## 2022-06-29 NOTE — CARE COORDINATION
ACM called patient. Left message requesting a return call for [facility] out reach. Contact information supplied.

## 2022-06-29 NOTE — TELEPHONE ENCOUNTER
[address] Maribel called requesting a script for PT for patient needs to be faxed to [phone]     Please advise and thank you

## 2022-06-29 NOTE — TELEPHONE ENCOUNTER
Ariana Loera from [employer] is calling stating that she believes the form from [facility] may be a scam. Will you please call Amy Rothman to discuss?     Ariana Luz Maria [phone]

## 2022-06-30 ENCOUNTER — APPOINTMENT (OUTPATIENT)
Dept: GENERAL RADIOLOGY | Age: 62
DRG: 314 | End: 2022-06-30
Payer: MEDICARE

## 2022-06-30 ENCOUNTER — HOSPITAL ENCOUNTER (INPATIENT)
Age: 62
LOS: 7 days | Discharge: NURSING, REHAB OR LONG-TERM CARE | DRG: 314 | End: 2022-07-09
Attending: STUDENT IN AN ORGANIZED HEALTH CARE EDUCATION/TRAINING PROGRAM | Admitting: INTERNAL MEDICINE
Payer: MEDICARE

## 2022-06-30 ENCOUNTER — APPOINTMENT (OUTPATIENT)
Dept: CT IMAGING | Age: 62
DRG: 314 | End: 2022-06-30
Payer: MEDICARE

## 2022-06-30 ENCOUNTER — TELEPHONE (OUTPATIENT)
Dept: OTHER | Facility: CLINIC | Age: 62
End: 2022-06-30

## 2022-06-30 DIAGNOSIS — S06.0X0A CONCUSSION WITHOUT LOSS OF CONSCIOUSNESS, INITIAL ENCOUNTER: ICD-10-CM

## 2022-06-30 DIAGNOSIS — N18.5 CHRONIC RENAL FAILURE, STAGE 5 (HCC): ICD-10-CM

## 2022-06-30 DIAGNOSIS — S22.41XA CLOSED FRACTURE OF MULTIPLE RIBS OF RIGHT SIDE, INITIAL ENCOUNTER: Primary | ICD-10-CM

## 2022-06-30 DIAGNOSIS — R53.81 PHYSICAL DECONDITIONING: ICD-10-CM

## 2022-06-30 DIAGNOSIS — F33.1 MODERATE EPISODE OF RECURRENT MAJOR DEPRESSIVE DISORDER (HCC): ICD-10-CM

## 2022-06-30 DIAGNOSIS — E66.09 CLASS 1 OBESITY DUE TO EXCESS CALORIES WITH SERIOUS COMORBIDITY AND BODY MASS INDEX (BMI) OF 33.0 TO 33.9 IN ADULT: ICD-10-CM
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
## 2022-06-30 DIAGNOSIS — J90 BILATERAL PLEURAL EFFUSION: ICD-10-CM

## 2022-06-30 DIAGNOSIS — Z91.81 AT HIGH RISK FOR INJURY RELATED TO FALL: ICD-10-CM

## 2022-06-30 PROBLEM — S82.91XA MULTIPLE CLOSED FRACTURES OF RIGHT LOWER EXTREMITY AND RIBS: Status: ACTIVE | Noted: 2022-06-30

## 2022-06-30 PROBLEM — Z78.9 IMPAIRED MOBILITY AND ACTIVITIES OF DAILY LIVING: Status: ACTIVE | Noted: 2022-06-30

## 2022-06-30 PROBLEM — S22.089A CLOSED T11 FRACTURE (HCC): Status: ACTIVE | Noted: 2022-06-30

## 2022-06-30 PROBLEM — Z74.09 IMPAIRED MOBILITY AND ACTIVITIES OF DAILY LIVING: Status: ACTIVE | Noted: 2022-06-30

## 2022-06-30 PROBLEM — Z99.2 DIALYSIS PATIENT (HCC): Status: ACTIVE | Noted: 2022-06-30

## 2022-06-30 PROBLEM — S41.101A UNSPECIFIED OPEN WOUND OF RIGHT UPPER ARM, INITIAL ENCOUNTER: Status: ACTIVE | Noted: 2022-06-14

## 2022-06-30 PROBLEM — G93.40 ENCEPHALOPATHY ACUTE: Status: ACTIVE | Noted: 2022-06-30

## 2022-06-30 LAB
ALBUMIN SERPL-MCNC: 3.9 G/DL (ref 3.5–4.6)
ALP BLD-CCNC: 132 U/L (ref 40–130)
ALT SERPL-CCNC: 25 U/L (ref 0–33)
AMMONIA: 15 UMOL/L (ref 11–51)
ANION GAP SERPL CALCULATED.3IONS-SCNC: 10 MEQ/L (ref 9–15)
APTT: 30.3 SEC (ref 24.4–36.8)
AST SERPL-CCNC: 40 U/L (ref 0–35)
BASOPHILS ABSOLUTE: 0 K/UL (ref 0–0.2)
BASOPHILS RELATIVE PERCENT: 0.5 %
BILIRUB SERPL-MCNC: 1.4 MG/DL (ref 0.2–0.7)
BUN BLDV-MCNC: 15 MG/DL (ref 8–23)
C-REACTIVE PROTEIN, HIGH SENSITIVITY: 34.4 MG/L (ref 0–5)
C-REACTIVE PROTEIN: 26.8 MG/L (ref 0–5)
CALCIUM SERPL-MCNC: 9 MG/DL (ref 8.5–9.9)
CHLORIDE BLD-SCNC: 95 MEQ/L (ref 95–107)
CO2: 30 MEQ/L (ref 20–31)
CREAT SERPL-MCNC: 4.37 MG/DL (ref 0.5–0.9)
EOSINOPHILS ABSOLUTE: 0 K/UL (ref 0–0.7)
EOSINOPHILS RELATIVE PERCENT: 0.4 %
GFR AFRICAN AMERICAN: 12.3
GFR NON-AFRICAN AMERICAN: 10.2
GLOBULIN: 2.3 G/DL (ref 2.3–3.5)
GLUCOSE BLD-MCNC: 207 MG/DL (ref 70–99)
GLUCOSE BLD-MCNC: 226 MG/DL (ref 70–99)
GLUCOSE BLD-MCNC: 263 MG/DL (ref 70–99)
HCT VFR BLD CALC: 26.5 % (ref 37–47)
HEMOGLOBIN: 9.1 G/DL (ref 12–16)
INFLUENZA A BY PCR: NEGATIVE
INFLUENZA B BY PCR: NEGATIVE
INR BLD: 1.3
LACTIC ACID: 2.5 MMOL/L (ref 0.5–2.2)
LYMPHOCYTES ABSOLUTE: 0.3 K/UL (ref 1–4.8)
LYMPHOCYTES RELATIVE PERCENT: 4.5 %
MAGNESIUM: 1.8 MG/DL (ref 1.7–2.4)
MCH RBC QN AUTO: 32.4 PG (ref 27–31.3)
MCHC RBC AUTO-ENTMCNC: 34.6 % (ref 33–37)
MCV RBC AUTO: 93.6 FL (ref 82–100)
MONOCYTES ABSOLUTE: 0.4 K/UL (ref 0.2–0.8)
MONOCYTES RELATIVE PERCENT: 5.6 %
NEUTROPHILS ABSOLUTE: 6.5 K/UL (ref 1.4–6.5)
NEUTROPHILS RELATIVE PERCENT: 89 %
PDW BLD-RTO: 15.1 % (ref 11.5–14.5)
PERFORMED ON: ABNORMAL
PERFORMED ON: ABNORMAL
PLATELET # BLD: 92 K/UL (ref 130–400)
POTASSIUM SERPL-SCNC: 4 MEQ/L (ref 3.4–4.9)
PRO-BNP: NORMAL PG/ML
PROTHROMBIN TIME: 16.3 SEC (ref 12.3–14.9)
RBC # BLD: 2.83 M/UL (ref 4.2–5.4)
SARS-COV-2, NAAT: NOT DETECTED
SODIUM BLD-SCNC: 135 MEQ/L (ref 135–144)
TOTAL CK: 28 U/L (ref 0–170)
TOTAL PROTEIN: 6.2 G/DL (ref 6.3–8)
TROPONIN: 0.04 NG/ML (ref 0–0.01)
TSH SERPL DL<=0.05 MIU/L-ACNC: 0.51 UIU/ML (ref 0.44–3.86)
WBC # BLD: 7.3 K/UL (ref 4.8–10.8)

## 2022-06-30 PROCEDURE — 85610 PROTHROMBIN TIME: CPT

## 2022-06-30 PROCEDURE — 92610 EVALUATE SWALLOWING FUNCTION: CPT

## 2022-06-30 PROCEDURE — 85025 COMPLETE CBC W/AUTO DIFF WBC: CPT

## 2022-06-30 PROCEDURE — 71250 CT THORAX DX C-: CPT

## 2022-06-30 PROCEDURE — 87502 INFLUENZA DNA AMP PROBE: CPT

## 2022-06-30 PROCEDURE — 94640 AIRWAY INHALATION TREATMENT: CPT

## 2022-06-30 PROCEDURE — 87150 DNA/RNA AMPLIFIED PROBE: CPT

## 2022-06-30 PROCEDURE — 2500000003 HC RX 250 WO HCPCS: Performed by: INTERNAL MEDICINE

## 2022-06-30 PROCEDURE — 6370000000 HC RX 637 (ALT 250 FOR IP): Performed by: STUDENT IN AN ORGANIZED HEALTH CARE EDUCATION/TRAINING PROGRAM

## 2022-06-30 PROCEDURE — G0378 HOSPITAL OBSERVATION PER HR: HCPCS

## 2022-06-30 PROCEDURE — 83735 ASSAY OF MAGNESIUM: CPT

## 2022-06-30 PROCEDURE — 74176 CT ABD & PELVIS W/O CONTRAST: CPT

## 2022-06-30 PROCEDURE — 6360000002 HC RX W HCPCS: Performed by: INTERNAL MEDICINE

## 2022-06-30 PROCEDURE — 82140 ASSAY OF AMMONIA: CPT

## 2022-06-30 PROCEDURE — 72131 CT LUMBAR SPINE W/O DYE: CPT

## 2022-06-30 PROCEDURE — 94761 N-INVAS EAR/PLS OXIMETRY MLT: CPT

## 2022-06-30 PROCEDURE — 87040 BLOOD CULTURE FOR BACTERIA: CPT

## 2022-06-30 PROCEDURE — 87635 SARS-COV-2 COVID-19 AMP PRB: CPT

## 2022-06-30 PROCEDURE — 72128 CT CHEST SPINE W/O DYE: CPT

## 2022-06-30 PROCEDURE — 82550 ASSAY OF CK (CPK): CPT

## 2022-06-30 PROCEDURE — 99285 EMERGENCY DEPT VISIT HI MDM: CPT

## 2022-06-30 PROCEDURE — 93005 ELECTROCARDIOGRAM TRACING: CPT | Performed by: STUDENT IN AN ORGANIZED HEALTH CARE EDUCATION/TRAINING PROGRAM

## 2022-06-30 PROCEDURE — 71046 X-RAY EXAM CHEST 2 VIEWS: CPT

## 2022-06-30 PROCEDURE — 70450 CT HEAD/BRAIN W/O DYE: CPT

## 2022-06-30 PROCEDURE — 36415 COLL VENOUS BLD VENIPUNCTURE: CPT

## 2022-06-30 PROCEDURE — 72125 CT NECK SPINE W/O DYE: CPT

## 2022-06-30 PROCEDURE — 84443 ASSAY THYROID STIM HORMONE: CPT

## 2022-06-30 PROCEDURE — 84484 ASSAY OF TROPONIN QUANT: CPT

## 2022-06-30 PROCEDURE — 83880 ASSAY OF NATRIURETIC PEPTIDE: CPT

## 2022-06-30 PROCEDURE — 86141 C-REACTIVE PROTEIN HS: CPT

## 2022-06-30 PROCEDURE — 71045 X-RAY EXAM CHEST 1 VIEW: CPT

## 2022-06-30 PROCEDURE — 6370000000 HC RX 637 (ALT 250 FOR IP): Performed by: INTERNAL MEDICINE

## 2022-06-30 PROCEDURE — 99222 1ST HOSP IP/OBS MODERATE 55: CPT | Performed by: PHYSICIAN ASSISTANT

## 2022-06-30 PROCEDURE — 96375 TX/PRO/DX INJ NEW DRUG ADDON: CPT

## 2022-06-30 PROCEDURE — 80053 COMPREHEN METABOLIC PANEL: CPT

## 2022-06-30 PROCEDURE — 83605 ASSAY OF LACTIC ACID: CPT

## 2022-06-30 PROCEDURE — 85730 THROMBOPLASTIN TIME PARTIAL: CPT

## 2022-06-30 PROCEDURE — 96372 THER/PROPH/DIAG INJ SC/IM: CPT

## 2022-06-30 RX ORDER — ARIPIPRAZOLE 5 MG/1
TABLET ORAL
Qty: 30 TABLET | Refills: 10 | OUTPATIENT
Start: 2022-06-30

## 2022-06-30 RX ORDER — DEXTROSE MONOHYDRATE 50 MG/ML
100 INJECTION, SOLUTION INTRAVENOUS PRN
Status: DISCONTINUED | OUTPATIENT
Start: 2022-06-30 | End: 2022-07-09 | Stop reason: HOSPADM

## 2022-06-30 RX ORDER — PROCHLORPERAZINE EDISYLATE 5 MG/ML
10 INJECTION INTRAMUSCULAR; INTRAVENOUS 3 TIMES DAILY PRN
Status: DISCONTINUED | OUTPATIENT
Start: 2022-06-30 | End: 2022-07-09 | Stop reason: HOSPADM

## 2022-06-30 RX ORDER — LANOLIN ALCOHOL/MO/W.PET/CERES
3 CREAM (GRAM) TOPICAL NIGHTLY PRN
Status: DISCONTINUED | OUTPATIENT
Start: 2022-06-30 | End: 2022-07-09 | Stop reason: HOSPADM

## 2022-06-30 RX ORDER — ASPIRIN 81 MG/1
81 TABLET ORAL DAILY
Status: DISCONTINUED | OUTPATIENT
Start: 2022-06-30 | End: 2022-07-09 | Stop reason: HOSPADM

## 2022-06-30 RX ORDER — MECOBALAMIN 5000 MCG
10 TABLET,DISINTEGRATING ORAL NIGHTLY
Status: DISCONTINUED | OUTPATIENT
Start: 2022-06-30 | End: 2022-07-09 | Stop reason: HOSPADM

## 2022-06-30 RX ORDER — INSULIN GLARGINE 100 [IU]/ML
35 INJECTION, SOLUTION SUBCUTANEOUS NIGHTLY
Status: DISCONTINUED | OUTPATIENT
Start: 2022-06-30 | End: 2022-07-09 | Stop reason: HOSPADM

## 2022-06-30 RX ORDER — ALBUTEROL SULFATE 2.5 MG/3ML
2.5 SOLUTION RESPIRATORY (INHALATION)
Status: COMPLETED | OUTPATIENT
Start: 2022-06-30 | End: 2022-06-30

## 2022-06-30 RX ORDER — ATORVASTATIN CALCIUM 40 MG/1
40 TABLET, FILM COATED ORAL NIGHTLY
Status: DISCONTINUED | OUTPATIENT
Start: 2022-06-30 | End: 2022-07-09 | Stop reason: HOSPADM

## 2022-06-30 RX ORDER — ENOXAPARIN SODIUM 100 MG/ML
30 INJECTION SUBCUTANEOUS DAILY
Status: DISCONTINUED | OUTPATIENT
Start: 2022-06-30 | End: 2022-06-30 | Stop reason: ALTCHOICE

## 2022-06-30 RX ORDER — ACETAMINOPHEN 325 MG/1
650 TABLET ORAL EVERY 6 HOURS PRN
Status: DISCONTINUED | OUTPATIENT
Start: 2022-06-30 | End: 2022-07-09 | Stop reason: HOSPADM

## 2022-06-30 RX ORDER — HEPARIN SODIUM 5000 [USP'U]/ML
5000 INJECTION, SOLUTION INTRAVENOUS; SUBCUTANEOUS EVERY 8 HOURS SCHEDULED
Status: DISCONTINUED | OUTPATIENT
Start: 2022-06-30 | End: 2022-07-01

## 2022-06-30 RX ORDER — HYDROXYZINE HYDROCHLORIDE 25 MG/1
25 TABLET, FILM COATED ORAL 3 TIMES DAILY PRN
Status: DISCONTINUED | OUTPATIENT
Start: 2022-06-30 | End: 2022-07-09 | Stop reason: HOSPADM

## 2022-06-30 RX ORDER — INSULIN LISPRO 100 [IU]/ML
0-12 INJECTION, SOLUTION INTRAVENOUS; SUBCUTANEOUS EVERY 4 HOURS
Status: DISCONTINUED | OUTPATIENT
Start: 2022-06-30 | End: 2022-07-02

## 2022-06-30 RX ORDER — ARIPIPRAZOLE 5 MG/1
5 TABLET ORAL DAILY
Status: DISCONTINUED | OUTPATIENT
Start: 2022-06-30 | End: 2022-07-09 | Stop reason: HOSPADM

## 2022-06-30 RX ORDER — ACETAMINOPHEN 650 MG/1
650 SUPPOSITORY RECTAL EVERY 6 HOURS PRN
Status: DISCONTINUED | OUTPATIENT
Start: 2022-06-30 | End: 2022-07-09 | Stop reason: HOSPADM

## 2022-06-30 RX ORDER — FUROSEMIDE 40 MG/1
40 TABLET ORAL 2 TIMES DAILY
Status: DISCONTINUED | OUTPATIENT
Start: 2022-06-30 | End: 2022-07-01

## 2022-06-30 RX ORDER — TRAMADOL HYDROCHLORIDE 50 MG/1
100 TABLET ORAL ONCE
Status: COMPLETED | OUTPATIENT
Start: 2022-06-30 | End: 2022-06-30

## 2022-06-30 RX ORDER — ISOSORBIDE MONONITRATE 60 MG/1
120 TABLET, EXTENDED RELEASE ORAL DAILY
Status: DISCONTINUED | OUTPATIENT
Start: 2022-06-30 | End: 2022-07-09 | Stop reason: HOSPADM

## 2022-06-30 RX ORDER — MIDODRINE HYDROCHLORIDE 5 MG/1
5 TABLET ORAL
Status: DISCONTINUED | OUTPATIENT
Start: 2022-07-01 | End: 2022-07-09 | Stop reason: HOSPADM

## 2022-06-30 RX ORDER — ALBUTEROL SULFATE 2.5 MG/3ML
2.5 SOLUTION RESPIRATORY (INHALATION) EVERY 4 HOURS PRN
Status: DISCONTINUED | OUTPATIENT
Start: 2022-06-30 | End: 2022-07-09 | Stop reason: HOSPADM

## 2022-06-30 RX ORDER — LANOLIN ALCOHOL/MO/W.PET/CERES
400 CREAM (GRAM) TOPICAL DAILY
Status: DISCONTINUED | OUTPATIENT
Start: 2022-06-30 | End: 2022-07-09 | Stop reason: HOSPADM

## 2022-06-30 RX ADMIN — TRAMADOL HYDROCHLORIDE 100 MG: 50 TABLET, COATED ORAL at 10:10

## 2022-06-30 RX ADMIN — MICONAZOLE NITRATE: 2 POWDER TOPICAL at 21:05

## 2022-06-30 RX ADMIN — HEPARIN SODIUM 5000 UNITS: 5000 INJECTION INTRAVENOUS; SUBCUTANEOUS at 21:05

## 2022-06-30 RX ADMIN — PROCHLORPERAZINE EDISYLATE 10 MG: 5 INJECTION INTRAMUSCULAR; INTRAVENOUS at 15:07

## 2022-06-30 RX ADMIN — HEPARIN SODIUM 5000 UNITS: 5000 INJECTION INTRAVENOUS; SUBCUTANEOUS at 15:08

## 2022-06-30 RX ADMIN — ACETAMINOPHEN 650 MG: 650 SUPPOSITORY RECTAL at 21:39

## 2022-06-30 RX ADMIN — ALBUTEROL SULFATE 2.5 MG: 2.5 SOLUTION RESPIRATORY (INHALATION) at 14:40

## 2022-06-30 ASSESSMENT — ENCOUNTER SYMPTOMS
BLOOD IN STOOL: 0
PHOTOPHOBIA: 0
EYE REDNESS: 0
VOMITING: 0
DIARRHEA: 0
SINUS PRESSURE: 0
STRIDOR: 0
SORE THROAT: 0
SHORTNESS OF BREATH: 1
CONSTIPATION: 1
NAUSEA: 0
TROUBLE SWALLOWING: 0
BACK PAIN: 1
EYE PAIN: 0
CHEST TIGHTNESS: 0
SHORTNESS OF BREATH: 0
COUGH: 0
WHEEZING: 0
ABDOMINAL PAIN: 0

## 2022-06-30 ASSESSMENT — PAIN - FUNCTIONAL ASSESSMENT: PAIN_FUNCTIONAL_ASSESSMENT: 0-10

## 2022-06-30 ASSESSMENT — PAIN DESCRIPTION - ORIENTATION
ORIENTATION: RIGHT
ORIENTATION: RIGHT;POSTERIOR

## 2022-06-30 ASSESSMENT — PAIN DESCRIPTION - LOCATION
LOCATION: BACK
LOCATION: RIB CAGE

## 2022-06-30 ASSESSMENT — PAIN SCALES - GENERAL
PAINLEVEL_OUTOF10: 7
PAINLEVEL_OUTOF10: 10
PAINLEVEL_OUTOF10: 7

## 2022-06-30 ASSESSMENT — PAIN DESCRIPTION - DESCRIPTORS: DESCRIPTORS: ACHING;SHARP;SORE

## 2022-06-30 NOTE — PROGRESS NOTES
Physical Therapy   Facility/Department: [facility] MED SURG V258/C827-34    NAME: Kerry Johnson    : 1960 (50 y.o.)  MRN: [id number]    Account: [de-identified]  Gender: female    PT evaluation and treatment orders received. Chart reviewed. PT eval attempted. Hold PT eval: rapid response was called this afternoon. Will hold PT eval at this time. Discussed with Marcus Goode LPN. Will attempt PT evaluation again at earliest convenience.       Electronically signed by Monserrat Blackburn PT on 22 at 2:57 PM EDT

## 2022-06-30 NOTE — FLOWSHEET NOTE
6/30/22 @ 1510 NOTIFIED [address] # [phone]    6/30/22 @ 1512 NOTIFIED [facility] # [id number]    7/11/41@ 1856 NOTIFIED DR. Gabriel Soto [place]

## 2022-06-30 NOTE — PROGRESS NOTES
[facility] Occupational Therapy      Date: 2022  Patient Name: Neelima Portillo        MRN: [id number]  Account: [de-identified]   : 1960  (57 y.o.)  [facility]    Chart reviewed, attempted OT at [phone] for eval. Patient not seen 2° to: Other: Pt just admitted and rapid response called at 1430; will hold eval at this time to allow further evaluation by physician. Spoke to [employer] LPN. Will attempt again when able.     Electronically signed by CATHY Perera on 2022 at 2:55 PM

## 2022-06-30 NOTE — ED PROVIDER NOTES
weakness and headaches. Hematological: Does not bruise/bleed easily. All other systems reviewed and are negative. Except as noted above the remainder of the review of systems was reviewed and negative. PAST MEDICAL HISTORY     Past Medical History:   Diagnosis Date    Angina at rest [facility]) 5/24/2020    Anxiety     CAD S/P percutaneous coronary angioplasty 2015, 2018    stents per dr Harini Bentley    CH[facility]estive heart failure) [facility]     CKD (chronic kidney disease) stage 4, GFR 15-29 ml/min ([address] 2/24/2018    CKD stage 4 due to type 2 diabetes mellitus ([address]     Contusion of right chest wall 2/16/2021    COPD (chronic obstructive pulmonary disease) ([address]     Diabetic nephropathy with proteinuria ([address] 2014    DJD (degenerative joint disease) of knee     Dr Rachele Viramontes GERD (gastroesophageal reflux disease)     Hemiparesis, left ([address] 2013    entered Assisted Living ([facility])    Hemodialysis patient [facility])     Hemodialysis-associated hypotension 10/22/2021    History of heart failure     History of seizures     History of type C viral hepatitis     HTN (hypertension)     Hyperlipidemia     Impaired mobility and activities of daily living     Mediastinal lymphadenopathy 2013    Adriane Antunez    Metabolic syndrome     Moderate persistent asthma without complication 4/02/0213    Need for extended care facility 7/7/2021    Neurogenic urinary incontinence 2013    Neuropathy in diabetes [facility])     Nonrheumatic mitral valve regurgitation 7/7/2021    Nonrheumatic tricuspid valve regurgitation 7/7/2021    Obesity (BMI 30-39. 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 Mercedes Melchor      x1 Dr. Michelle Blanco, Dr Kaz Ron 2018   [address]  08/10/2021    [facility]    DIAGNOSTIC CARDIAC CATH LAB PROCEDURE  10/02/2019    DIALYSIS CATHETER INSERTION Left 2022    Tunneled Symetrex 15.5F x 23cm hemodialysis catheter inserted by Dr. Attila Thorne AVF  2022    IR THROMBECTOMY PERCUT AVF 2022 [facility] PROCEDURE    IR TUNNELED CATHETER PLACEMENT GREATER THAN 5 YEARS  6/3/2022    IR TUNNELED CATHETER PLACEMENT GREATER THAN 5 YEARS 6/3/2022 [facility] PROCEDURE    KS TOTAL KNEE ARTHROPLASTY Left 2018    LEFT KNEE TOTAL KNEE ARTHROPLASTY, SHAYNA, NERVE BLOCK performed by Renata Pompa MD at [address] [place] [address] [facility]      TOE AMPUTATION Right     TOTAL ABDOMINAL HYSTERECTOMY      one ovary intact, Dr Lili Onofre, menorrhagia    TOTAL KNEE ARTHROPLASTY  2016    Dr Belen Mart TUNNELED [address] Right 2020    tunneled HD catheter per Dr Ronna Fonseca       Previous Medications    ACETAMINOPHEN (TYLENOL) 325 MG TABLET    Take 2 tablets by mouth every 4 hours as needed for Pain (For mild to moderate pain (Pain 1-6 out of 10 on pain scale))    ALBUTEROL (PROVENTIL) (2.5 MG/3ML) 0.083% NEBULIZER SOLUTION    Take 3 mLs by nebulization every 4 hours as needed for Wheezing    ALCOHOL SWABS (EASY TOUCH ALCOHOL PREP MEDIUM) 70 % PADS    USE AS DIRECTED THREE TIMES A DAY    ARIPIPRAZOLE (ABILIFY) 5 MG TABLET    TAKE 1 TABLET BY MOUTH ONCE DAILY    ASPIRIN EC 81 MG EC TABLET    Take 1 tablet by mouth daily    ATORVASTATIN (LIPITOR) 40 MG TABLET    TAKE 1 TABLET BY MOUTH DAILY    B COMPLEX-C-FOLIC ACID (NEPHROCAPS) 1 MG CAPSULE    Take 1 capsule by mouth daily    BLOOD GLUCOSE MONITORING SUPPL (FREESTYLE LITE) CORETTA    1 Device by Does not apply route daily as needed (Diabetes) Use freestyle meter to test blood sugar as needed    BLOOD GLUCOSE MONITORING SUPPL (ONETOUCH VERIO) W/DEVICE KIT    AS DIRECTED    BLOOD GLUCOSE TEST STRIPS (FREESTYLE LITE) STRIP    1 each by Does not apply route 4 times daily (before meals and nightly) As needed. BLOOD GLUCOSE TEST STRIPS (ONETOUCH VERIO) STRIP    QID    FREESTYLE LANCETS MISC    Test 4x daily    FUROSEMIDE (LASIX) 20 MG TABLET    Take 1 tablet by mouth 2 times daily    HANDICAP PLACARD MISC    by Does not apply route Expiration in 5 years. HYDROXYZINE (ATARAX) 25 MG TABLET    TAKE ONE TABLET BY MOUTH TWO TIMES A DAY    INSULIN ASPART (NOVOLOG FLEXPEN) 100 UNIT/ML INJECTION PEN    INJECT 8-10  units with each meals    INSULIN ASPART (NOVOLOG FLEXPEN) 100 UNIT/ML INJECTION PEN    15 UNITS PLUS SLIDING SCALE   LESS THAN 180 NONE  181-250 2 UNITS  251-300 4 UNITS  301-400 6 UNITS   401-500 10 UNITS    INSULIN GLARGINE (LANTUS SOLOSTAR) 100 UNIT/ML INJECTION PEN    70 UNITS AT BEDTIME    INSULIN PEN NEEDLE (SURE COMFORT PEN NEEDLES) 30G X 8 MM MISC    USE AS DIRECTED FIVE TIMES A DAILY    ISOSORBIDE MONONITRATE (IMDUR) 30 MG EXTENDED RELEASE TABLET    Take 4 tablets by mouth daily    LANTUS SOLOSTAR 100 UNIT/ML INJECTION PEN    55 units at bedtime    LIDOCAINE-PRILOCAINE (EMLA) 2.5-2.5 % CREAM    Apply topically as needed for Pain Apply topically as needed.  3 times a week before dialysis wrap with saran wrap    MAGNESIUM OXIDE (MAG-OX) 400 MG TABLET    TAKE 1 TABLET BY MOUTH DAILY    MELATONIN 10 MG CAPS CAPSULE    Take 1 capsule by mouth nightly    METOPROLOL TARTRATE (LOPRESSOR) 25 MG TABLET    TAKE 1/2 TABLET BY MOUTH TWO TIMES DAILY     MIDODRINE (PROAMATINE) 5 MG TABLET    Take 1 tablet by mouth one time a day every Tue, Thu, Sat for hypotension. Give 30 mins prior to dialysis. NITROGLYCERIN (NITROSTAT) 0.4 MG SL TABLET    Place 1 tablet under the tongue every 5 minutes as needed for Chest pain    NITROGLYCERIN (NITROSTAT) 0.4 MG SL TABLET    up to max of 3 total doses. If no relief after 1 dose, call [phone] [id number] UNIT/GM POWDER    APPLY TO AFFECTED AREA OF ABDOMINAL FOLDS EVERY 12 HOURS AS NEEDED    ONETOUCH DELICA LANCETS 18E MISC    QID    POTASSIUM CHLORIDE (KLOR-CON M) 20 MEQ EXTENDED RELEASE TABLET    Take 2 tablets by mouth 2 times daily (with meals)    SERTRALINE (ZOLOFT) 50 MG TABLET    TAKE 1 TABLET BY MOUTH DAILY       ALLERGIES     Codeine and Oxycontin [oxycodone hcl]    FAMILY HISTORY       Family History   Problem Relation Age of Onset    Cancer Mother 76        survived   [facility] Breast Cancer Mother     Hypertension Father     Diabetes Sister     Mental Illness Sister     Colon Cancer Neg Hx           SOCIAL HISTORY       Social History     Socioeconomic History    Marital status:      Spouse name: Not on file    Number of children: 2    Years of education: Not on file    Highest education level: Not on file   Occupational History    Occupation: disabled   Tobacco Use    Smoking status: Never Smoker    Smokeless tobacco: Never Used   Vaping Use    Vaping Use: Never used   Substance and Sexual Activity    Alcohol use: No     Alcohol/week: 0.0 standard drinks    Drug use: No    Sexual activity: Not Currently   Other Topics Concern    Not on file   Social History Narrative    Born in [place], one of 5    Twin sister Reyes, very ill in 2018, [place] 2019    Moved to [facility], [place], , 2 children, one son and one daughter    Worked at [email or web address], as a nurse's aide    Disabled due to mental illness    Lived at [employer], was the Last Year: Not on file    Unstable Housing in the Last Year: Not on file       SCREENINGS   NIH Stroke Scale  Interval: Baseline  Level of Consciousness (1a): Alert  LOC Questions (1b): Answers both correctly  LOC Commands (1c): Performs both tasks correctly  Best Gaze (2): Normal  Visual (3): No visual loss  Facial Palsy (4): Normal symmetrical movement  Motor Arm, Left (5a): No drift  Motor Arm, Right (5b): No drift  Motor Leg, Left (6a): No drift  Motor Leg, Right (6b): No drift  Limb Ataxia (7): Absent  Sensory (8): Normal  Best Language (9): No aphasia  Dysarthria (10): Normal  Extinction and Inattention (11): No abnormality  Total: 0Glasgow Coma Scale  Eye Opening: Spontaneous  Best Verbal Response: Oriented  Best Motor Response: Obeys commands  Michele Coma Scale Score: 15 @FLOW([id number])@      PHYSICAL EXAM    (up to 7 for level 4, 8 or more for level 5)     ED Triage Vitals [06/30/22 0705]   BP Temp Temp Source Heart Rate Resp SpO2 Height Weight   (!) 125/58 99.3 °F (37.4 °C) Oral 94 18 95 % 5' 7\" (1.702 m) 217 lb (98.4 kg)       Physical Exam  Vitals and nursing note reviewed. Constitutional:       General: She is awake. She is in acute distress. Appearance: Normal appearance. She is well-developed and normal weight. She is not ill-appearing, toxic-appearing or diaphoretic. Comments: No photophobia. No phonophobia. HENT:      Head: Normocephalic and atraumatic. No Gee's sign. Right Ear: External ear normal.      Left Ear: External ear normal.      Nose: Nose normal. No congestion or rhinorrhea. Mouth/Throat:      Mouth: Mucous membranes are moist.      Pharynx: Oropharynx is clear. No oropharyngeal exudate or posterior oropharyngeal erythema. Eyes:      General: No scleral icterus. Right eye: No foreign body or discharge. Left eye: No discharge. Extraocular Movements: Extraocular movements intact.       Conjunctiva/sclera: Conjunctivae normal.      Left eye: No exudate. Pupils: Pupils are equal, round, and reactive to light. Neck:      Vascular: No JVD. Trachea: No tracheal deviation. Comments: No meningismus. Cardiovascular:      Rate and Rhythm: Normal rate and regular rhythm. Pulses: Normal pulses. Heart sounds: Normal heart sounds. Heart sounds not distant. No murmur heard. No friction rub. No gallop. Pulmonary:      Effort: Pulmonary effort is normal. No respiratory distress. Breath sounds: Normal breath sounds. No stridor. No wheezing, rhonchi or rales. Chest:      Chest wall: No tenderness. Abdominal:      General: Abdomen is flat. Bowel sounds are normal. There is no distension or abdominal bruit. There are no signs of injury. Palpations: Abdomen is soft. There is no shifting dullness, fluid wave, hepatomegaly, splenomegaly, mass or pulsatile mass. Tenderness: There is no abdominal tenderness. There is left CVA tenderness. There is no right CVA tenderness, guarding or rebound. Hernia: No hernia is present. Musculoskeletal:         General: No swelling, tenderness, deformity or signs of injury. Normal range of motion. Cervical back: Normal, normal range of motion and neck supple. No rigidity. Back:       Comments: Tenderness from T12-S1 on the left. Lymphadenopathy:      Head:      Right side of head: No submental adenopathy. Left side of head: No submental adenopathy. Skin:     General: Skin is warm and dry. Capillary Refill: Capillary refill takes less than 2 seconds. Coloration: Skin is not jaundiced or pale. Findings: No bruising, erythema, lesion or rash. Neurological:      General: No focal deficit present. Mental Status: She is alert and oriented to person, place, and time. Mental status is at baseline. Cranial Nerves: No cranial nerve deficit. Sensory: No sensory deficit. Motor: No weakness.       Coordination: Coordination normal. COMPLICATION IDENTIFIED. CHRONIC FINDINGS, NOT SIGNIFICANTLY CHANGED FROM 4/20/2022.                      ED BEDSIDE ULTRASOUND:   Performed by ED Physician - none    LABS:  Labs Reviewed   C-REACTIVE PROTEIN - Abnormal; Notable for the following components:       Result Value    CRP 26.8 (*)     All other components within normal limits   LACTIC ACID - Abnormal; Notable for the following components:    Lactic Acid 2.5 (*)     All other components within normal limits   CBC WITH AUTO DIFFERENTIAL - Abnormal; Notable for the following components:    RBC 2.83 (*)     Hemoglobin 9.1 (*)     Hematocrit 26.5 (*)     MCH 32.4 (*)     RDW 15.1 (*)     Platelets 92 (*)     Lymphocytes Absolute 0.3 (*)     All other components within normal limits   COMPREHENSIVE METABOLIC PANEL - Abnormal; Notable for the following components:    Glucose 226 (*)     CREATININE 4.37 (*)     GFR Non- 10.2 (*)     GFR  12.3 (*)     Total Protein 6.2 (*)     Total Bilirubin 1.4 (*)     Alkaline Phosphatase 132 (*)     AST 40 (*)     All other components within normal limits   HIGH SENSITIVITY CRP - Abnormal; Notable for the following components:    CRP High Sensitivity 34.4 (*)     All other components within normal limits   PROTIME-INR - Abnormal; Notable for the following components:    Protime 16.3 (*)     All other components within normal limits   TROPONIN - Abnormal; Notable for the following components:    Troponin 0.037 (*)     All other components within normal limits    Narrative:     CALL  [facility]  ED tel. [phone],  KATIE results called to and read back by Justyn Reich, 06/30/2022 09:16, by  POCDE   COVID-19, RAPID   RAPID INFLUENZA A/B ANTIGENS   CULTURE, BLOOD 1   CULTURE, BLOOD 2   APTT   BRAIN NATRIURETIC PEPTIDE    Narrative:     CALL  [facility]  ED tel. [phone],  KATIE results called to and read back by Justyn Reich, 06/30/2022 09:16, by  POCDE   CK   MAGNESIUM   TSH    Narrative:     Marin Lizama [facility]  [facility] tel. [phone],  KATIE results called to and read back by Azam Calvin, 06/30/2022 09:16, by  Isidoro Garcia       All other labs were within normal range or not returned as of this dictation. EMERGENCY DEPARTMENT COURSE and DIFFERENTIAL DIAGNOSIS/MDM:   Vitals:    Vitals:    06/30/22 0705 06/30/22 0715 06/30/22 0922   BP: (!) 125/58  (!) 117/54   Pulse: 94  76   Resp: 18     Temp: 99.3 °F (37.4 °C) 98.8 °F (37.1 °C)    TempSrc: Oral     SpO2: 95%  98%   Weight: 217 lb (98.4 kg)     Height: 5' 7\" (1.702 m)             MDM  And physically deconditioned. Has rib fractures. Not able to ambulate. History of chronic renal failure. At dialysis today but they felt that she was confused. Patient was \"pan scanned\" no pneumonia. No brain bleed. Recommendation for hospitalization. Trauma signed off of her chart. Trauma does not feel that the injuries of the 2 fractures on the right is causing her to have the severe low back pain and inability to ambulate. Patient is a risk for fall. And patient is significantly physically deconditioned. Patient may need placement after hospitalization for physical rehabilitation. CONSULTS:  None    PROCEDURES:  Unless otherwise noted below, none     Procedures    FINAL IMPRESSION      1. Closed fracture of multiple ribs of right side, initial encounter    2. Concussion without loss of consciousness, initial encounter    3. Fall from standing, initial encounter    4. Chronic renal failure, stage 5 (HCC)    5. Bilateral pleural effusion          DISPOSITION/PLAN   DISPOSITION        PATIENT REFERRED TO:  No follow-up provider specified.     DISCHARGE MEDICATIONS:  New Prescriptions    No medications on file          (Please note that portions of this note were completed with a voice recognition program.  Efforts were made to edit the dictations but occasionally words are mis-transcribed.)    Vane Diaz,  (electronically signed)  Attending Emergency Physician         [address] Cardenas [facility], [place], DO  06/30/22 1500

## 2022-06-30 NOTE — PROGRESS NOTES
[facility] Respiratory Therapy Evaluation   Current Order:  Aero albuterol Q4prn     Home Regimen:same   Ordering Physician: Carlito Mon Date:  done    Diagnosis:weakness  Patient Status: Stable / Unstable + Physician notified    The following MDI Criteria must be met in order to convert aerosol to MDI with spacer. If unable to meet, MDI will be converted to aerosol:  []  Patient able to demonstrate the ability to use MDI effectively  []  Patient alert and cooperative  []  Patient able to take deep breath with 5-10 second hold  []  Medication(s) available in this delivery method   []  Peak flow greater than or equal to 200 ml/min            Current Order Substituted To  (same drug, same frequency)   Aerosol to MDI [] Albuterol Sulfate 0.083% unit dose by aerosol Albuterol Sulfate MDI 2 puffs by inhalation with spacer    [] Levalbuterol 1.25 mg unit dose by aerosol Levalbuterol MDI 2 puffs by inhalation with spacer    [] Levalbuterol 0.63 mg unit dose by aerosol Levalbuterol MDI 2 puffs by inhalation with spacer    [] Ipratropium Bromide 0.02% unit dose by aerosol Ipratropium Bromide MDI 2 puffs by inhalation with spacer    [] Duoneb (Ipratropium + Albuterol) unit dose by aerosol Ipratropium MDI + Albuterol MDI 2 puffs by inhalation w/spacer   MDI to Aerosol [] Albuterol Sulfate MDI Albuterol Sulfate 0.083% unit dose by aerosol    [] Levalbuterol MDI 2 puffs by inhalation Levalbuterol 1.25 mg unit dose by aerosol    [] Ipratropium Bromide MDI by inhalation Ipratropium Bromide 0.02% unit dose by aerosol    [] Combivent (Ipratropium + Albuterol) MDI by inhalation Duoneb (Ipratropium + Albuterol) unit dose by aerosol       Treatment Assessment [Frequency/Schedule]:  Change frequency to: _____________ no change_____________________________________per Protocol, P&T, MEC      Points 0 1 2 3 4   Pulmonary Status  Non-Smoker  []   Smoking history   < 20 pack years  []   Smoking history  ?  20 pack years  [] Pulmonary Disorder  (acute or chronic)  [x]   Severe or Chronic w/ Exacerbation  []     Surgical Status No [x]   Surgeries     General []   Surgery Lower []   Abdominal Thoracic or []   Upper Abdominal Thoracic with  PulmonaryDisorder  []     Chest X-ray Clear/Not  Ordered     [x]  Chronic Changes  Results Pending  []  Infiltrates, atelectasis, pleural effusion, or edema  []  Infiltrates in more than one lobe []  Infiltrate + Atelectasis, &/or pleural effusion  []    Respiratory Pattern Regular,  RR = 12-20 [x]  Increased,  RR = 21-25 []  GONZALEZ, irregular,  or RR = 26-30 []  Decreased FEV1  or RR = 31-35 []  Severe SOB, use  of accessory muscles, or RR ? 35  []    Mental Status Alert, oriented,  Cooperative []  Confused but Follows commands [x]  Lethargic or unable to follow commands []  Obtunded  []  Comatose  []    Breath Sounds Clear to  auscultation  [x]  Decreased unilaterally or  in bases only []  Decreased  bilaterally  []  Crackles or intermittent wheezes []  Wheezes []    Cough Strong, Spontan., & nonproductive []  Strong,  spontaneous, &  productive []  Weak,  Nonproductive []  Weak, productive or  with wheezes []  No spontaneous  cough or may require suctioning []    Level of Activity Ambulatory []  Ambulatory w/ Assist  [x]  Non-ambulatory []  Paraplegic []  Quadriplegic []    Total    Score:____5___     Triage Score:__5______      Tri       Triage:     1. (>20) Freq: Q3    2. (16-20) Freq: Q4   3. (11-15) Freq: QID & Albuterol Q2 PRN    4. (6-10) Freq: TID & Albuterol Q2 PRN    5. (0-5) Freq Q4prn

## 2022-06-30 NOTE — CARE COORDINATION
[facility] Case Management Initial Discharge Assessment    Met with Patient to discuss discharge plan. PCP: Leann Gray MD                     Date of Last Visit: 4 months  Dr. Ulises Alexandre Patient: No        [facility] Notified: no    If no PCP, list provided? N/A    Discharge Planning    Living Arrangements: at home dependent on nursing care    Who do you live with? Daughter     Who helps you with your care:  family or [facility] 4 hours per day aide    If lives at home:     Do you have any barriers navigating in your home? No, ramp into home    Patient can perform ADL? No    Current Services (outpatient and in home) :  [address] [facility] [facility] ([address])    Dialysis: Yes, Location T,TH, Sat, Chair Time 6am [facility]    Is transportation available to get to your appointments? Yes, [id number] transit     DME Equipment:  yes - [employer]     Respiratory equipment: Nebulizer    Respiratory provider:  No, owns     Pharmacy:  yes - [facility] [employer] with Medication Assistance Program?  No      Patient agreeable to [place] [id number]? Yes, [address] [facility] [address]    Patient agreeable to SNF/Rehab? Declined    Other discharge needs identified? N/A    Does Patient Have a High-Risk for Readmission Diagnosis (CHF, PN, MI, COPD)? Yes    If Yes,     Consult with pulmonologist?   [place] with cardiologist?   [facility] referral if EF <35%?  Consult with Pharmacy for medication assessment prior to discharge? No   Consult with Behavioral health to aid in depression, anxiety, or coping issues? No   Palliative Care Consult? N/A   Pulmonary Rehab order for COPD, PN, and CHF (if EF > 35%)? N/A    Does patient have a reliable scale and know how to read it (for CHF)? No   Nutrition consult for CHF?  Respiratory therapy consult that includes bedside instruction on administration of nebulizers and/or inhalers, and assessment of oxygen and equipment needs in the home? Initial Discharge Plan? (Note: please see concurrent daily documentation for any updates after initial note). Met with pt at bedside to discuss discharge planning. Pt plans to return home. Pt [place] home health [place] and will need a resumption of care on discharge. States the aides are there 4 hours per day for her personal care.      Readmission Risk              Risk of Unplanned Readmission:  0         Electronically signed by Doreen Mendoza RN on 6/30/2022 at 11:51 AM

## 2022-06-30 NOTE — CONSULTS
Physical Medicine & Rehabilitation  Consult Note      Admitting Physician: Laurie Salinas MD    Primary Care Provider: Leah Vera MD     Reason for Consult:  Asses rehab needs, promote physical and mental function, analyze level of care to determine rehab needs, improve ability to actively participate in the rehabilitation process, and decrease likelihood of re-admit to the hospital after discharge. History of Present Illness:    Ankit Balbuena is a 57 y.o. female admitted to [facility] on 6/30/2022. Patient was evaluated through the emergency room for 10 out of 10 low back pain and unsteady gait. She is recently followed for end-stage renal disease as well as a nonfunctioning fistula with need for a Vas-Cath. She was also seen frequently in the ER. She was admitted through the ER on 6/30/2022. She is being followed by trauma team as well as nephrology. She is getting dialysis 3 days a week. He was diagnosed with sepsis with Enterococcus her temporary dialysis catheter has been removed which they felt was the source of the infection. NOELLE is pending rule out endocarditis        Back Pain  This is a recurrent problem. The current episode started today. The pain is present in the lumbar spine and thoracic spine. The pain is at a severity of 10/10. The pain is severe. The pain is worse during the day. Associated symptoms include weakness. Pertinent negatives include no abdominal pain, chest pain, dysuria, fever or headaches. Risk factors include sedentary lifestyle, history of osteoporosis, lack of exercise, poor posture and menopause. Diabetes  She presents for her follow-up diabetic visit. She has type 2 diabetes mellitus. Her disease course has been fluctuating. Pertinent negatives for hypoglycemia include no dizziness, headaches, nervousness/anxiousness, seizures or tremors. Associated symptoms include fatigue and weakness.  Pertinent negatives for diabetes include no chest pain, no polydipsia and no polyuria. Risk factors for coronary artery disease include obesity. Current diabetic treatment includes insulin injections. She is currently taking insulin pre-breakfast, pre-lunch, pre-dinner and at bedtime. Her overall blood glucose range is 130-140 mg/dl. (Lab Results       Component                Value               Date                       LABA1C                   6.7                 04/13/2022            )         I reviewed recent nursing notes discussed care with acute care providers, \" Patient to ED via [address] for c/o right side backpain 10/10 r/t fall last night at home from standing. Patient was at dialysis today at the [facility] and they became concerned when patient appeared unsteady. Patient did not complete dialysis today. Skin p/w/d. Respirations even and unlabored. No acute distress noted at this time. \".   Events from the previous 24 hours reviewed   dialysis Monday Wednesday and Friday. .      Their inpatient work up has included: Follow-up on type 2 diabetes complications include coronary artery disease history of renal failure on dialysis blood sugars have been labile   Patient on Lantus 70 units at bedtime NovoLog sliding scale  Testing 3-4 times daily hemoglobin A1c was 6.7      Imaging:  Imaging and other studies reviewed and discussed with patient and staff    CT ABDOMEN PELVIS : 6/30/2022 Minimally displaced acute fractures of the posterior right 10th and 11th ribs are noted. A small right pleural effusion has mildly decreased in size from the prior study. There is no organized hematoma, other displaced fractures, evidence of solid organ injury (within the limits of a noncontrast study), or other significant changes from 4/20/2022 identified.  A small to moderate-sized left pleural effusion, mild probable scarring and/or atelectasis of the visualized lung bases, a moderate compression fracture of T11, and other previously described chronic findings appear unchanged. MINIMALLY DISPLACED ACUTE FRACTURES OF THE POSTERIOR RIGHT 10TH AND 11TH RIBS. NO OTHER SIGNIFICANT RECENT POSTTRAUMATIC COMPLICATION IDENTIFIED. CHRONIC FINDINGS, NOT SIGNIFICANTLY CHANGED FROM 4/20/2022. XR CHEST  6/30/2022 There is mild to moderate cardiomegaly. No definite evidence of pulmonary venous congestion. Chronic changes are noted in the left lower thorax. There is a dialysis catheter in place. There is a valve prosthesis in place. There are small pleural effusions or blunting of both costophrenic angles. NO SIGNIFICANT CHANGE SINCE THE PREVIOUS EXAM. THERE HAS BEEN PLACEMENT OF A DIALYSIS CATHETER SINCE THAT EXAM.     XR RIBS RIGHT  : 6/22/2022: There are no lytic or sclerotic bone lesions. There are mild deformities of the lateral fifth, sixth, and seventh ribs which may represent subacute to chronic fractures. Status post median sternotomy. There is a left internal jugular central venous catheter in place. The cardiomediastinal silhouette is enlarged. The visualized portions of the lung and chest wall are within normal limits. There are no radiopaque foreign bodies. There are no acute changes. CT HEAD  6/30/2022 There is no intracranial hemorrhage, mass effect, midline shift, extra-axial collection, evidence of hydrocephalus, recent ischemic infarct, or skull fracture identified. Chronic volume loss and mild mucosal thickening is again noted within the maxillary sinuses. The mastoid air cells and other visualized paranasal sinuses are essentially clear. NO ACUTE INTRACRANIAL PROCESS OR SIGNIFICANT CHANGE FROM 10/11/2021 IDENTIFIED. CT CHEST  : 6/30/2022: Minimally displaced acute fractures of the posterior right 10th and 11th ribs appear similar to the recent abdomen CT.  Mild right lateral rib deformities of the mid levels appears substantially similar to 1/12/2022, given the motion artifact on the previous exam. There is no organized hematoma, significant pulmonary contusion, or other acute posttraumatic complication identified. Since the prior study, a left internal jugular approach hemodialysis catheter has been placed with its tip within the SVC, and postoperative changes from previous mitral valve replacement are otherwise unremarkable. The heart remains mildly enlarged with  a very small pericardial effusion. A small to moderate-sized left pleural effusion, small right pleural effusion, and mild probable atelectasis or scarring of the mid to lower lung fields is noted. MINIMALLY DISPLACED ACUTE RIGHT 10TH AND 11TH RIB FRACTURES. NO OTHER ACUTE FRACTURE OR SIGNIFICANT POSTTRAUMATIC COMPLICATION IDENTIFIED. CT CERVICAL SPINE : 6/30/2022  There is some reversal of the normal lordotic curve. There is slight scoliosis. There is significant degenerative arthritis with narrowing of all the interspaces. No fracture is seen. No prevertebral soft tissue swelling. SIGNIFICANT DEGENERATIVE ARTHRITIS. NO FRACTURE IS SEEN. CT THORACIC SPINE  6/30/2022  There is mild thoracic kyphoscoliosis. There is a compression fracture of T11 with some anterior wedging. Radiographic lead this appears to be old rather than acute. No other fracture is seen. There is mild to moderate degenerative arthritis. We note a small pleural effusion on the right and a moderate sized effusion on the left. OLD COMPRESSION FRACTURE OF T11. AN ACUTE FRACTURE IS NOT SEEN. BILATERAL PLEURAL EFFUSIONS. CT LUMBAR SPINE  6/30/2022  EXAMINATION:  CT LUMBAR SPINE. CLINICAL HISTORY:  TRAUMA. COMPARISONS:  NONE AVAILABLE TECHNIQUE:  Serial 2.5 mm scans. No contrast. FINDINGS:  Vertebral bodies are in fairly normal alignment. Interspaces are fairly well maintained. There is mild degenerative arthritis. No fracture is seen. MILD DEGENERATIVE ARTHRITIS. NO FRACTURE. IR FLUORO GUIDED CVA DEVICE PLMT/REPLACE/REMOVAL  1.     Successful placement of a central venous cathete     XR CHEST  6/2/2022  The cardiomediastinal silhouette is prominent Prominence of the bronchovascular and interstitial lung markings is visualized bilaterally. Peribronchial cuffing is seen, opacification visualized overlying the left lower lung field with obscuration of the left costophrenic angle consistent with left pleural effusion. No evidence of pneumothorax or parenchymal lung mass. The bony thorax unremarkable for the patient's age. Congestive changes.                     Labs:    Labs reviewed and discussed with patient and staff    Lab Results   Component Value Date/Time    POCGLU 164 07/04/2022 07:43 AM    POCGLU 178 07/03/2022 09:53 PM    POCGLU 162 07/03/2022 04:43 PM    POCGLU 180 07/03/2022 11:26 AM    POCGLU 133 07/03/2022 08:24 AM     Lab Results   Component Value Date/Time     07/04/2022 03:07 AM    K 4.3 07/04/2022 03:07 AM    CL 90 07/04/2022 03:07 AM    CO2 27 07/04/2022 03:07 AM    BUN 27 07/04/2022 03:07 AM    CREATININE 4.16 07/04/2022 03:07 AM    CALCIUM 9.0 07/04/2022 03:07 AM    LABALBU 3.3 07/04/2022 03:07 AM    BILITOT 0.9 07/04/2022 03:07 AM    ALKPHOS 117 07/04/2022 03:07 AM    AST 21 07/04/2022 03:07 AM    ALT 16 07/04/2022 03:07 AM     Lab Results   Component Value Date/Time    WBC 5.8 07/04/2022 03:07 AM    RBC 2.83 07/04/2022 03:07 AM    HGB 9.1 07/04/2022 03:07 AM    HCT 26.3 07/04/2022 03:07 AM    MCV 93.0 07/04/2022 03:07 AM    MCH 32.1 07/04/2022 03:07 AM    MCHC 34.5 07/04/2022 03:07 AM    RDW 15.0 07/04/2022 03:07 AM     07/04/2022 03:07 AM    MPV 10.0 03/05/2020 12:00 AM     Lab Results   Component Value Date/Time    VITD25 21 10/22/2019 12:00 AM     Lab Results   Component Value Date/Time    COLORU Yellow 04/20/2022 10:45 AM    LABSPEC 1.010 03/08/2016 12:00 AM    NITRU Negative 04/20/2022 10:45 AM    GLUCOSEU Negative 04/20/2022 10:45 AM    KETUA TRACE 04/20/2022 10:45 AM    UROBILINOGEN 1.0 04/20/2022 10:45 AM    BILIRUBINUR Negative 04/20/2022 10:45 AM    BILIRUBINUR 3+ 09/17/2021 12:49 PM     Lab Results   Component Value Date/Time    PROTIME 16.3 06/30/2022 07:30 AM     Lab Results   Component Value Date/Time    INR 1.3 06/30/2022 07:30 AM         I discussed results with patient. Current Rehabilitation Assessments:    Rehabilitation:  Physical Therapy  Bed mobility:  Bed mobility  Supine to Sit: Contact guard assistance (07/02/22 1223)  Sit to Supine: Minimal assistance (07/02/22 1223)  Transfers:  Transfers  Sit to Stand: Contact guard assistance (07/02/22 1224)  Stand to sit: Contact guard assistance (07/02/22 1224)  Bed to Chair: Contact guard assistance (07/02/22 1224)  Gait:   Ambulation  Surface: level tile (07/02/22 1224)  Device: Rollator (07/02/22 1224)  Assistance: Contact guard assistance (07/02/22 1224)  Gait Deviations: Slow Tere (07/02/22 1224)  Distance: 2 ft limited by L knee pain (07/02/22 1224)  Stairs:  Stairs/Curb  Stairs?: No (07/02/22 1224)  W/C mobility:         Occupational therapy:      ADL  Feeding: Setup (07/03/22 1211)  Grooming: Minimal assistance (07/03/22 1211)  UE Dressing: Minimal assistance (07/03/22 1211)  LE Dressing: Dependent/Total (07/03/22 1211)  LE Dressing Skilled Clinical Factors: pt unable to manage brief over hips- fear of falling in standing (07/03/22 1211)  Toileting: Dependent/Total (07/03/22 1211)  Toileting Skilled Clinical Factors: reports significant difficulty with hygiene/clothing management (07/03/22 1211)  Additional Comments: Simulated ADLs. Pt complaining of fatigue with activity. Pt reports unable to assist with clothing over feet. Pt on purewick upon entering room but asked OT to remove it due to discomfort (07/03/22 1211)               Speech therapy:            Diet/Swallow:        Dysphagia Outcome Severity Scale: Level 4: Mild moderate dysphagia- Intermittent supervision/cueing.  One - two diet consistencies restricted  Compensatory Swallowing Strategies : Eat/Feed foot ([address]     Type 2 diabetes mellitus with renal manifestations, controlled ([address] 2015    Insulin dependent, Dr Celinda Bamberger    Uncontrolled type 2 diabetes mellitus with hyperglycemia ([address]     Urinary incontinence due to cognitive impairment     Vitamin D deficiency          PastSurgical History:        Procedure Laterality Date     SECTION      x1    COLONOSCOPY  2014    Dr. Champion Repress      x1 Dr. Bessie Vega, Dr Bustos Comp  08/10/2021    [facility]    DIAGNOSTIC CARDIAC CATH LAB PROCEDURE  10/02/2019    DIALYSIS CATHETER INSERTION Left 2022    Tunneled Symetrex 15.5F x 23cm hemodialysis catheter inserted by Dr. Toni Xiong, TOTAL ABDOMINAL (CERVIX REMOVED)      one ovary intact, Dr Cervantes Child, menorrhagia    IR THROMBECTOMY PERCUT AVF  2022    IR THROMBECTOMY PERCUT AVF 2022 [facility] PROCED[facility]    IR TUNNELED CATHETER PLACEMENT GREATER THAN 5 YEARS  6/3/2022    IR TUNNELED CATHETER PLACEMENT GREATER THAN 5 YEARS 6/3/2022 [facility] PROCED[facility]    RI TOTAL KNEE ARTHROPLASTY Left 2018    LEFT KNEE TOTAL KNEE ARTHROPLASTY, SHAYNA, NERVE BLOCK performed by Ruben Spangler MD at [address] [place] [address] [facility]      TOE AMPUTATION Right     TOTAL KNEE ARTHROPLASTY  2016    Dr Arsalan Rouse TUNNELED [address] Right 2020    tunneled HD catheter per Dr Ortega Bi:     Allergies   Allergen Reactions    Codeine Hives     hives    Oxycontin [Oxycodone Hcl] Hives        CurrentMedications:   Current Facility-Administered Medications: [START ON 2022] vancomycin 1000 mg IVPB in 250 mL D5W addavial, 1,000 mg, IntraVENous, Once  lidocaine 4 % external patch 3 patch, 3 patch, TransDERmal, Daily  camphor-menthol-methyl salicylate (BENGAY ULTRA STRENGTH) 4-10-30 % cream, , Apply externally, TID PRN  polyethylene glycol (GLYCOLAX) packet 17 g, 17 g, Oral, Daily  insulin lispro (HUMALOG) injection vial 0-12 Units, 0-12 Units, SubCUTAneous, TID WC  insulin lispro (HUMALOG) injection vial 0-6 Units, 0-6 Units, SubCUTAneous, Nightly  heparin (porcine) injection 2,000 Units, 2,000 Units, IntraVENous, Once  epoetin chadwick-epbx (RETACRIT) injection 10,000 Units, 10,000 Units, SubCUTAneous, Once  vancomycin (VANCOCIN) intermittent dosing (placeholder), , Other, RX Placeholder  melatonin tablet 3 mg, 3 mg, Oral, Nightly PRN  acetaminophen (TYLENOL) tablet 650 mg, 650 mg, Oral, Q6H PRN **OR** acetaminophen (TYLENOL) suppository 650 mg, 650 mg, Rectal, Q6H PRN  prochlorperazine (COMPAZINE) injection 10 mg, 10 mg, IntraVENous, TID PRN  albuterol (PROVENTIL) nebulizer solution 2.5 mg, 2.5 mg, Nebulization, Q4H PRN  ARIPiprazole (ABILIFY) tablet 5 mg, 5 mg, Oral, Daily  aspirin EC tablet 81 mg, 81 mg, Oral, Daily  atorvastatin (LIPITOR) tablet 40 mg, 40 mg, Oral, Nightly  hydrOXYzine HCl (ATARAX) tablet 25 mg, 25 mg, Oral, TID PRN  insulin glargine (LANTUS) injection vial 35 Units, 35 Units, SubCUTAneous, Nightly  isosorbide mononitrate (IMDUR) extended release tablet 120 mg, 120 mg, Oral, Daily  magnesium oxide (MAG-OX) tablet 400 mg, 400 mg, Oral, Daily  melatonin disintegrating tablet 10 mg, 10 mg, Oral, Nightly  midodrine (PROAMATINE) tablet 5 mg, 5 mg, Oral, Once per day on Mon Wed Fri  miconazole (MICOTIN) 2 % powder, , Topical, BID  sertraline (ZOLOFT) tablet 50 mg, 50 mg, Oral, Nightly  glucose chewable tablet 16 g, 4 tablet, Oral, PRN  dextrose bolus 10% 125 mL, 125 mL, IntraVENous, PRN **OR** dextrose bolus 10% 250 mL, 250 mL, IntraVENous, PRN  glucagon (rDNA) injection 1 mg, 1 mg, IntraMUSCular, PRN  dextrose 5 % solution, 100 mL/hr, IntraVENous, PRN      Social History:  Social History     Socioeconomic History    Marital status:      Spouse name: Not on file    Number of children: 2    Years of education: Not on file    Highest education level: Not on file   Occupational History    Occupation: disabled   Tobacco Use    Smoking status: Never Smoker    Smokeless tobacco: Never Used   Vaping Use    Vaping Use: Never used   Substance and Sexual Activity    Alcohol use: No     Alcohol/week: 0.0 standard drinks    Drug use: No    Sexual activity: Not Currently   Other Topics Concern    Not on file   Social History Narrative    Disabled, lives in [address] is close by     [facility] in [place], one of 5    Twin sister Reyes, very ill in 2018, [place] 2019    Moved to [facility], , 2 children, one son and one daughter    Worked at [employer], as a nurse's aide    Disabled due to mental illness    Lived at [employer], was discharged, returned to independent living in 2017 in the daughter's house and has adjusted well    One son and one daughter, live in the same house with patient, Pramod Montes De Oca pays the rent    [employer] reading (mysteries)             10/11/2021 [facility] updates; patient lives with her daughter son-in-law and 2 grandchildren and patient's handicapped son. Per daughter, her brother is blind, MRDD, multiple health issues. Daughter's  is patient's legal guardian. Patient has hemodialysis Tuesday Thursday and Saturday. Patient's bedroom is on main floor with a half bath. Daughter walks patient upstairs once weekly for full bath. Patient is using her walker in the home. Patient has a hospital bed in the home. Social Determinants of Health     Financial Resource Strain: Low Risk     Difficulty of Paying Living Expenses: Not hard at all   Food Insecurity: No Food Insecurity    Worried About Running Out of Food in the Last Year: Never true    Yung of Food in the Last Year: Never true   Transportation Needs: No Transportation Needs    Lack of Transportation (Medical): No    Lack of Transportation (Non-Medical):  No   Physical Activity: Sufficiently Active    Days of Exercise per Week: 3 days    Minutes of Exercise per Session: 60 min   Stress:     Feeling of Stress : Not on file   Social Connections:     Frequency of Communication with Friends and Family: Not on file    Frequency of Social Gatherings with Friends and Family: Not on file    Attends [employer] Services: Not on file    Active Member of Clubs or Organizations: Not on file    Attends Club or Organization Meetings: Not on file    Marital Status: Not on file   Intimate Partner Violence:     Fear of Current or Ex-Partner: Not on file    Emotionally Abused: Not on file    Physically Abused: Not on file    Sexually Abused: Not on file   Housing Stability:     Unable to Pay for Housing in the Last Year: Not on file    Number of [place] in the Last Year: Not on file    Unstable Housing in the Last Year: Not on file        This history was obtained from family and caregivers and discussed to confirm. Family History:       Problem Relation Age of Onset   [de-identified] Cancer Mother 76        survived   [de-identified] Breast Cancer Mother     Hypertension Father     Diabetes Sister     Mental Illness Sister     Colon Cancer Neg Hx        Review of Systems:  Review of Systems   Constitutional: Positive for activity change and fatigue. Negative for chills, diaphoresis and fever. HENT: Negative for congestion, ear discharge, ear pain, hearing loss, nosebleeds, sore throat and tinnitus. Eyes: Negative for photophobia, pain and redness. Respiratory: Positive for shortness of breath. Negative for cough, wheezing and stridor. Shortness of breath on exertion   Cardiovascular: Negative for chest pain, palpitations and leg swelling. Gastrointestinal: Positive for constipation. Negative for abdominal pain, blood in stool, diarrhea, nausea and vomiting. Endocrine: Negative for polydipsia and polyuria. Genitourinary: Negative for dysuria, flank pain, frequency, hematuria and urgency.    Musculoskeletal: Positive for back pain, gait problem and myalgias. Negative for neck pain. Skin: Negative for rash. Allergic/Immunologic: Positive for immunocompromised state. Negative for environmental allergies. Neurological: Positive for weakness. Negative for dizziness, tremors, seizures and headaches. Hematological: Does not bruise/bleed easily. Psychiatric/Behavioral: Negative for hallucinations and suicidal ideas. The patient is not nervous/anxious. Physical Exam:  BP (!) 117/52   Pulse 80   Temp 98.1 °F (36.7 °C) (Oral)   Resp 18   Ht 5' 7\" (1.702 m)   Wt 196 lb (88.9 kg)   LMP  (LMP Unknown)   SpO2 94%   BMI 30.70 kg/m²      Physical Exam  Constitutional:       General: She is not in acute distress. Appearance: She is well-developed. She is not diaphoretic. HENT:      Head: Normocephalic and atraumatic. Nose: Nose normal.      Mouth/Throat:      Pharynx: No oropharyngeal exudate. Eyes:      General: No scleral icterus. Right eye: No discharge. Left eye: No discharge. Conjunctiva/sclera: Conjunctivae normal.   Neck:      Thyroid: No thyromegaly. Vascular: No JVD. Trachea: No tracheal deviation. Cardiovascular:      Rate and Rhythm: Normal rate and regular rhythm. Heart sounds: Normal heart sounds. No murmur heard. No friction rub. No gallop. Pulmonary:      Effort: No respiratory distress. Breath sounds: No stridor. No wheezing or rales. Chest:      Chest wall: No tenderness. Abdominal:      General: Bowel sounds are normal. There is no distension. Palpations: Abdomen is soft. There is no mass. Tenderness: There is no abdominal tenderness. There is no guarding or rebound. Hernia: No hernia is present. Musculoskeletal:         General: No tenderness or deformity. Arms:       Cervical back: Neck supple. Comments: No thrill or pulse in dialysis graft. Skin:     General: Skin is dry. Coloration: Skin is not pale.       Findings: No erythema or rash. Neurological:      Mental Status: She is alert and oriented to person, place, and time. Cranial Nerves: No cranial nerve deficit. Psychiatric:         Behavior: Behavior normal.         Thought Content: Thought content normal.         Judgment: Judgment normal.       Ortho Exam  Neurologic Exam     Mental Status   Oriented to person, place, and time.    Level of consciousness: alert            Diagnostics:    Recent Results (from the past 24 hour(s))   POCT Glucose    Collection Time: 07/03/22 11:26 AM   Result Value Ref Range    POC Glucose 180 (H) 70 - 99 mg/dl    Performed on ACCU-CHEK    POCT Glucose    Collection Time: 07/03/22  4:43 PM   Result Value Ref Range    POC Glucose 162 (H) 70 - 99 mg/dl    Performed on ACCU-CHEK    POCT Glucose    Collection Time: 07/03/22  9:53 PM   Result Value Ref Range    POC Glucose 178 (H) 70 - 99 mg/dl    Performed on ACCU-CHEK    CBC with Auto Differential    Collection Time: 07/04/22  3:07 AM   Result Value Ref Range    WBC 5.8 4.8 - 10.8 K/uL    RBC 2.83 (L) 4.20 - 5.40 M/uL    Hemoglobin 9.1 (L) 12.0 - 16.0 g/dL    Hematocrit 26.3 (L) 37.0 - 47.0 %    MCV 93.0 82.0 - 100.0 fL    MCH 32.1 (H) 27.0 - 31.3 pg    MCHC 34.5 33.0 - 37.0 %    RDW 15.0 (H) 11.5 - 14.5 %    Platelets 641 (L) 378 - 400 K/uL    Neutrophils % 62.4 %    Lymphocytes % 21.6 %    Monocytes % 10.1 %    Eosinophils % 5.2 %    Basophils % 0.7 %    Neutrophils Absolute 3.6 1.4 - 6.5 K/uL    Lymphocytes Absolute 1.2 1.0 - 4.8 K/uL    Monocytes Absolute 0.6 0.2 - 0.8 K/uL    Eosinophils Absolute 0.3 0.0 - 0.7 K/uL    Basophils Absolute 0.0 0.0 - 0.2 K/uL   Comprehensive Metabolic Panel w/ Reflex to MG    Collection Time: 07/04/22  3:07 AM   Result Value Ref Range    Sodium 128 (L) 135 - 144 mEq/L    Potassium reflex Magnesium 4.3 3.4 - 4.9 mEq/L    Chloride 90 (L) 95 - 107 mEq/L    CO2 27 20 - 31 mEq/L    Anion Gap 11 9 - 15 mEq/L    Glucose 154 (H) 70 - 99 mg/dL    BUN 27 (H) 8 - 23 mg/dL    CREATININE 4.16 (H) 0.50 - 0.90 mg/dL    GFR Non-African American 10.8 (L) >60    GFR  13.0 (L) >60    Calcium 9.0 8.5 - 9.9 mg/dL    Total Protein 6.1 (L) 6.3 - 8.0 g/dL    Albumin 3.3 (L) 3.5 - 4.6 g/dL    Total Bilirubin 0.9 (H) 0.2 - 0.7 mg/dL    Alkaline Phosphatase 117 40 - 130 U/L    ALT 16 0 - 33 U/L    AST 21 0 - 35 U/L    Globulin 2.8 2.3 - 3.5 g/dL   POCT Glucose    Collection Time: 07/04/22  7:43 AM   Result Value Ref Range    POC Glucose 164 (H) 70 - 99 mg/dl    Performed on ACCU-CHEK               Impression:    1. Impaired mobility and ADLs due to acute encephalopathy  2. Severe left knee pain  3. Factors favoring recovery include:  near independent premorbid function        Complex Active General Medical Issues that complicate care and require daily medical supervision: 1. Principal Problem:    MRSA bacteremia  Active Problems:    Impaired mobility and activities of daily living    Dialysis patient [facility])    Multiple closed fractures of right lower extremity and ribs    Closed T11 fracture ([facility]    Encephalopathy acute    Sepsis due to Enterococcus ([facility] [address]    Local infection due to central venous catheter    Chronic diastolic congestive heart failure ([facility] [address] [facility]    Closed rib fracture  Resolved Problems:    * No resolved hospital problems. *            Recommendations:    1. Considering all of the factors above including the patient's current medical status, social status/home environment, their functional needs, and their ability to participate in a therapy program, I feel that they would best be served at:    acute intensive comprehensive inpatient rehabilitation program.  Due to the diagnoses above the patient has had significant decline in function and is now requiring acute intensive therapy to optimize function. They are expected to achieve meaningful functional gains.   Due to medical complexity as above, rehabilitation physician services is reasonable and necessary including face-to-face visits at least 3 days/week with anticipated need to treat, manage and modify the rehabilitation course of treatment including interdisciplinary team conferences. They will require rehab physician care to monitor for neurogenic bowel bladder, postoperative pain, titration of opiate and high risk medications,   blood pressure and blood sugar control. It is my opinion that they will be able to tolerate and benefit from 3 hours of therapy a day. I reviewed the various options re: levels of care with the patient and family. Please see pre-admission screen note for further details. I discussed acute rehab with the patient and verify that the patient is able and willing to participate in 3 hours of therapy a day. Rehab and Acute Care Case Management has also reinforced this expectation. This patient requires multidisciplinary rehabilitation treatment, including daily care and management from a PM&R physician, 24-hour rehabilitation nursing, Physical Therapy, Occupational Therapy, rehabilitation psychology, consideration of speech and language pathology, recreational therapy, nutritional services, and a rehabilitation . I feel that it is reasonable to plan for a discharge to home setting after acute rehab. 2. Specialized nursing care to focus on:  1. Bowel and bladder issues-Monitor for urinary retention-check PVRs, bladder scan--cath if no void. 2. Wound management re   -pressure relief protocols-side to side turns  3. IV medication administration      3. Monitor endurance and if necessary spread therapy out over a 7-day window-adding scheduled rest breaks when needed. Focus on energy conservation. Monitor heart rate and   cardiac medications effects on heart rate and blood pressure before, during and after therapy. Progress toward endurance training with pulse ox monitoring for saturation and heart rate.     4. monitoring for dysphagia-- Improve hydration and nutrition by adding Vitamin B12 shot times one, adding Protein supplements and push PO fluids. 5.   Monitor for higher level cognitive deficits, focus on difficulty with sequencing and problem-solving. 6. Focus on higher-level balance and falls risk issues focusing on balance training and monitoring for orthostasis. Above recommendations are indicated to address medical complexity and need for appropriate rehab services. Will tailor individual care and rehab plan per individuals needs re breast severe left knee pain add massage Bengay and heat. Focus of today's plan-   prepare for NOELLE rule out endocarditis      Required Certification Data (potential inpatient rehabilitation facility patient's only)    Deficits:weakness, nutrition, mobility, impaired gait, high risk for falls, decreased endurance, deconditioning , debility, adjustment to disability, pain limiting function and balance and righting reactions    Disability:mobility, locomotion and self care    Potential barriers to progress/discharge:complex medical conditions, severe pain and complex social situations         It was my pleasure to evaluate Kenneth Pedroza today. Please call [phone] with questions.     Hayes Avila, DO

## 2022-06-30 NOTE — CARE COORDINATION
06/30/22    From: Home with daughter. Uses [employer].  Has [employer] aides 4 hours per day    Admit: Back pain    PMH: CHF, COPD, CAD, CKD, HD, Seizures,   DM II, Schizophrenia,   Anticipated Discharge Disposition: Home    Patient Mobility or PT/OT ordered: Yes  Consults: Nephrology, Neurology, Physical medicine rehab    Clinical:     Barriers to Discharge: Dialysis    Assessments: CMI and DCCOP Done

## 2022-06-30 NOTE — SIGNIFICANT EVENT
Rapid response was called for hypoxia upon evaluation patient oxygen saturation improved to 9900% with coughing. Told the nurse to keep the patient n.p.o., chest x-ray and albuterol treatment. PE  HEENT: AT/NC, PERRLA, no JVD  HEART: s1/s2 wnl w/o s3  LUNG: Decreased breath sounds, scattered wheezes  ABD: soft, NT  EXT: no edema  SKin : no rash  Neuro: Alert        Plan:   NPO, continue current care. Nebulizer treatment. We will add current attending note to follow-up on the chest x-ray.   CCT45 min

## 2022-06-30 NOTE — PROGRESS NOTES
DVT / VTE PROPHYLAXIS EVALUATION    Estimated Creatinine Clearance: 16 mL/min (A) (based on SCr of 4.37 mg/dL (H)). Recent Labs     06/30/22  0730   BUN 15   CREATININE 4.37*   PLT 92*   HGB 9.1*   HCT 26.5*   INR 1.3     ADMITTING DX OR CHIEF COMPLAINT? Back pain  WARFARIN? DOAC'S? no  ANY APPARENT BLEEDING? no  SCHEDULED SURGERY? unknown     Current order:  Enoxaparin 30 mg SUBQ once daily      Plan:  Clarification. Notation that patient on HD outpatient. Patient Weight (kg)      50.9 and below .9 101-150.9 151-174.9 175 or greater   Estimated   CrCl  (ml/min) 30 or greater []   30 mg   SUBQ daily   []   40 mg   SUBQ daily []  30 mg SUBQ   BID  []  40 mg   SUBQ   BID []  60mg SUBQ BID    15-29.9 []  UFH 5000   units SUBQ BID []  30 mg   SUBQ daily [] 30 mg SUBQ   daily []  40 mg SUBQ   daily [] 60 mg SUBQ   daily    Less than 15 or dialysis []  UFH 5000   units SUBQ BID [x] UFH 5000 units SUBQ TID []  UFH 7500   units   SUBQ TID       Mentioned patient on HD. Order adjusted to heparin TID. Monitor plt count.        DWIGHT Bradley CHANDRA [facility] PharmD

## 2022-06-30 NOTE — ED NOTES
Report called to [facility]. All questions and concerns addressed and answered. VSS and respirations e/u on RA. Pt denies any additional needs at this time.        Ana Maria Godwin RN  06/30/22 1183

## 2022-06-30 NOTE — CONSULTS
Trauma Consult / H & P Note    Reason for Consult: Trauma  Consulting Provider: Roselyn Hernandez DO      BASIC INJURY INFORMATION:  Level of activation: CAT 3  Mode of transport: EMS  Mechanism of injury: Fall from Standing  Complicating features: NA  Protective measures: NA    HISTORY OF PRESENT INJURY:   Sarah Pickering is a 57 y.o. female with a PMHx of Angina at rest, CAD s/p percutaneous coronary angioplasty w/ stents, CHF, CKD 4 d/t DMII, COPD, GERD, hemiparesis left, history of seizures, history hepatitis C, HTN, HLD, recurrent UTI, schizophrenia. Patient presents s/p FFS. Patient reports misstepping at 1am this AM an sustaining mechanical fall. Patient did receive dialysis yesterday and reports feeling dizzy. (+)head strike, (-)LOC, (+ASA). Has complaint of back pain. PRIMARY SURVEY:  Airway: Intact  Breathing: Normal   Breath Sounds: Breath Sounds Equal Bilaterally  Circulation:    Pulses: Normal   Skin: Normal skin color, texture and turgor  Disability:   Pupils: PERRL   GCS:    Best Eyes: 4    Best Verbal: 5    Best Motor: 6    Total: 15    Vitals:   Vitals:    06/30/22 0705 06/30/22 0715 06/30/22 0922   BP: (!) 125/58  (!) 117/54   Pulse: 94  76   Resp: 18     Temp: 99.3 °F (37.4 °C) 98.8 °F (37.1 °C)    TempSrc: Oral     SpO2: 95%  98%   Weight: 217 lb (98.4 kg)     Height: 5' 7\" (1.702 m)           SECONDARY SURVEY:  Neurologic: Alert and Oriented, Appropriate, Moves all Extremities and Strength Symmetrical  HEENT:   Head: No lacerations, bony step-offs, or abrasions and Midface stable to palpation   Eyes: PERRL, Corneas/Conjunctiva without lesions and EOM intact   Ears: No Hemotympanum   Nose: Septum Midline, No crepitus with motion; and No bloody discharge; Throat: Oral cavity without trauma . edentulous  Neck: No midline tenderness and No lacerations/wounds  Pulmonary: External exam: TTP to right lateral chest wall.  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 Alexandria Stern      x1 Dr. Nori Mcburney, Dr Ramirez Face  08/10/2021    [facility]    DIAGNOSTIC CARDIAC CATH LAB PROCEDURE  10/02/2019    DIALYSIS CATHETER INSERTION Left 2022    Tunneled Symetrex 15.5F x 23cm hemodialysis catheter inserted by Dr. Yenifer Roberteller AVF  2022    IR THROMBECTOMY PERCUT AVF 2022 [facility] SPECIAL PROCEDURE    IR TUNNELED CATHETER PLACEMENT GREATER THAN 5 YEARS  6/3/2022    IR TUNNELED CATHETER PLACEMENT GREATER THAN 5 YEARS 6/3/2022 [facility] SPECIAL PROCEDURE    GA TOTAL KNEE ARTHROPLASTY Left 2018    LEFT KNEE TOTAL KNEE ARTHROPLASTY, SHAYNA, NERVE BLOCK performed by Mustapha Mata MD at [address] [place] [address] [facility]      TOE AMPUTATION Right     TOTAL ABDOMINAL HYSTERECTOMY      one ovary intact, Dr George Min, menorrhagia    TOTAL KNEE ARTHROPLASTY  2016    Dr Bob Macias TUNNELED [address] Right 2020    tunneled HD catheter per Dr Mehta       PRE-ADMISSION MEDICATIONS:   Prior to Admission medications    Medication Sig Start Date End Date Taking?  Authorizing Provider   ARIPiprazole (ABILIFY) 5 MG tablet TAKE 1 TABLET BY MOUTH ONCE DAILY 6/28/22   Kaylah Medina MD   nitroGLYCERIN (NITROSTAT) 0.4 MG SL tablet up to max of 3 total doses. If no relief after 1 dose, call 911. 6/22/22   Rina Orozco DO   furosemide (LASIX) 20 MG tablet Take 1 tablet by mouth 2 times daily  Patient taking differently: Take 100 mg by mouth 2 times daily  5/16/22   Kaylah Medina MD   [facility] 605597 UNIT/GM powder APPLY TO AFFECTED AREA OF ABDOMINAL FOLDS EVERY 12 HOURS AS NEEDED 5/7/22   Kaylah Medina MD   insulin aspart (NOVOLOG FLEXPEN) 100 UNIT/ML injection pen 15 UNITS PLUS SLIDING SCALE   LESS THAN 180 NONE  181-250 2 UNITS  251-300 4 UNITS  301-400 6 UNITS   401-500 10 UNITS 5/6/22   Raymond Camacho MD   isosorbide mononitrate (IMDUR) 30 MG extended release tablet Take 4 tablets by mouth daily 5/5/22   Elsa Libman, MD   atorvastatin (LIPITOR) 40 MG tablet TAKE 1 TABLET BY MOUTH DAILY  Patient taking differently: Take 40 mg by mouth nightly  5/2/22   Kaylah Medina MD   magnesium oxide (MAG-OX) 400 MG tablet TAKE 1 TABLET BY MOUTH DAILY 5/2/22   Kaylah Medina MD   potassium chloride (KLOR-CON M) 20 MEQ extended release tablet Take 2 tablets by mouth 2 times daily (with meals) 4/9/22   Elsa Libman, MD   melatonin 10 MG CAPS capsule Take 1 capsule by mouth nightly 3/16/22   Jolie Cortés MD   midodrine (PROAMATINE) 5 MG tablet Take 1 tablet by mouth one time a day every Tue, Thu, Sat for hypotension. Give 30 mins prior to dialysis. 3/16/22   Jolie Cortés MD   Handicap Placard MISC by Does not apply route Expiration in 5 years. 3/16/22   Jolie Cortés MD   sertraline (ZOLOFT) 50 MG tablet TAKE 1 TABLET BY MOUTH DAILY  Patient taking differently: Take 50 mg by mouth nightly  3/1/22   Kaylah Medina MD   lidocaine-prilocaine (EMLA) 2.5-2.5 % cream Apply topically as needed for Pain Apply topically as needed.  3 times a week before dialysis wrap with saran wrap    Historical Provider, MD   albuterol (PROVENTIL) (2.5 MG/3ML) 0.083% nebulizer solution Take 3 mLs by nebulization every 4 hours as needed for Wheezing 12/22/21   Delmar Haley MD   hydrOXYzine (ATARAX) 25 MG tablet TAKE ONE TABLET BY MOUTH TWO TIMES A DAY 11/13/21   Historical Provider, MD   insulin glargine (LANTUS SOLOSTAR) 100 UNIT/ML injection pen 70 UNITS AT BEDTIME 12/13/21   Celeste Velasquez MD   blood glucose test strips (ONETOUCH VERIO) strip QID 12/13/21   Celeste Velasquez, MD Rehman Delica Lancets 36E MISC QID 12/13/21   Celeste Velasquez, MD   Blood Glucose Monitoring Suppl (ONETOUCH VERIO) w/Device KIT AS DIRECTED 12/13/21   Celeste Velasquez MD   Insulin Pen Needle (SURE COMFORT PEN NEEDLES) 30G X 8 MM MISC USE AS DIRECTED FIVE TIMES A DAILY 10/20/21   Celeste Velasquez MD   b complex-C-folic acid (NEPHROCAPS) 1 MG capsule Take 1 capsule by mouth daily    Historical Provider, MD   LANADILENE SOLOSTAR 100 UNIT/ML injection pen 55 units at bedtime 10/8/21   Celeste Velasquez MD   metoprolol tartrate (LOPRESSOR) 25 MG tablet TAKE 1/2 TABLET BY MOUTH TWO TIMES DAILY   Patient taking differently: Take 12.5 mg by mouth 2 times daily  9/17/21   Angelia Kim MD   insulin aspart (NOVOLOG FLEXPEN) 100 UNIT/ML injection pen INJECT 8-10  units with each meals 6/28/21   Celeste Velasquez MD   aspirin EC 81 MG EC tablet Take 1 tablet by mouth daily 5/25/21   Angelia Kim MD   blood glucose test strips (FREESTYLE LITE) strip 1 each by Does not apply route 4 times daily (before meals and nightly) As needed.  3/12/21   MARCELO Gaviria   Alcohol Swabs (EASY TOUCH ALCOHOL PREP MEDIUM) 70 % PADS USE AS DIRECTED THREE TIMES A DAY 3/12/21   MARCELO Gaviria   FreeStyle Lancets MISC Test 4x daily 3/11/21   MARCELO Gaviria   acetaminophen (TYLENOL) 325 MG tablet Take 2 tablets by mouth every 4 hours as needed for Pain (For mild to moderate pain (Pain 1-6 out of 10 on pain scale)) 2/24/21 Lena Dixon PA-C   Blood Glucose Monitoring Suppl (FREESTYLE LITE) CORETTA 1 Device by Does not apply route daily as needed (Diabetes) Use freestyle meter to test blood sugar as needed 12/29/20   MARCELO Baeza   nitroGLYCERIN (NITROSTAT) 0.4 MG SL tablet Place 1 tablet under the tongue every 5 minutes as needed for Chest pain 9/22/15   Historical Provider, MD       ALLERGIES:  Codeine and Oxycontin [oxycodone hcl]    SOCIAL HISTORY:   Social History     Socioeconomic History    Marital status:      Spouse name: Not on file    Number of children: 2    Years of education: Not on file    Highest education level: Not on file   Occupational History    Occupation: disabled   Tobacco Use    Smoking status: Never Smoker    Smokeless tobacco: Never Used   Vaping Use    Vaping Use: Never used   Substance and Sexual Activity    Alcohol use: No     Alcohol/week: 0.0 standard drinks    Drug use: No    Sexual activity: Not Currently   Other Topics Concern    Not on file   Social History Narrative    Born in [place], one of 5    Twin sister Reyes, very ill in 2018, [place] [address]    Moved to [facility], , 2 children, one son and one daughter    Worked at [employer], as a nurse's aide    Disabled due to mental illness    Lived at [employer], was discharged, returned to independent living in 2017 in the daughter's house and has adjusted well    One son and one daughter, live in the same house with patient, Maricruz Coleman pays the rent    [employer] reading (misteries)        10/11/2021 [facility] updates; patient lives with her daughter son-in-law and 2 grandchildren and patient's handicapped son. Per daughter, her brother is blind, MRDD, multiple health issues. Daughter's  is patient's legal guardian. Patient has hemodialysis Tuesday Thursday and Saturday. Patient's bedroom is on main floor with a half bath. Daughter walks patient upstairs once weekly for full bath.   Patient is using her walker in the home. Patient has a hospital bed in the home. Social Determinants of Health     Financial Resource Strain: Low Risk     Difficulty of Paying Living Expenses: Not hard at all   Food Insecurity: No Food Insecurity    Worried About Running Out of Food in the Last Year: Never true    Yung of Food in the Last Year: Never true   Transportation Needs: No Transportation Needs    Lack of Transportation (Medical): No    Lack of Transportation (Non-Medical): No   Physical Activity: Sufficiently Active    Days of Exercise per Week: 3 days    Minutes of Exercise per Session: 60 min   Stress:     Feeling of Stress : Not on file   Social Connections:     Frequency of Communication with Friends and Family: Not on file    Frequency of Social Gatherings with Friends and Family: Not on file    Attends Oriental orthodox Services: Not on file    Active Member of Clubs or Organizations: Not on file    Attends Club or Organization Meetings: Not on file    Marital Status: Not on file   Intimate Partner Violence:     Fear of Current or Ex-Partner: Not on file    Emotionally Abused: Not on file    Physically Abused: Not on file    Sexually Abused: Not on file   Housing Stability:     Unable to Pay for Housing in the Last Year: Not on file    Number of [place] in the Last Year: Not on file    Unstable Housing in the Last Year: Not on file       FAMILY HISTORY:  Family History   Problem Relation Age of Onset    Cancer Mother 76        survived   Debera  Breast Cancer Mother     Hypertension Father     Diabetes Sister     Mental Illness Sister     Colon Cancer Neg Hx            REVIEW OF SYSTEMS:  Constitutional: Negative for weight loss  HENT: Negative for congestion, facial swelling and bloody nose  Eyes: Negative for vision changes  Respiratory: Negative for shortness of breath, difficulty breathing  Cardiovascular: Negative for chest wall pain.    Gastrointestinal: Negative for abdominal distention, abdominal pain and vomiting. Genitourinary: Negative for hematuria  Musculoskeletal: Negative for gait difficulties   Skin: Negative for bruising, abrasions  Neurological: Negative for dizziness, weakness and light-headedness. Hematological: Negative for easy bruising/bleeding  Psychiatric/Behavioral: Negative for behavioral problems. Except as noted above the remainder of the review of systems was reviewed and negative.      BASIC LABS:   CBC with Differential:    Lab Results   Component Value Date/Time    WBC 7.3 06/30/2022 07:30 AM    RBC 2.83 06/30/2022 07:30 AM    HGB 9.1 06/30/2022 07:30 AM    HCT 26.5 06/30/2022 07:30 AM    PLT 92 06/30/2022 07:30 AM    MCV 93.6 06/30/2022 07:30 AM    MCH 32.4 06/30/2022 07:30 AM    MCHC 34.6 06/30/2022 07:30 AM    RDW 15.1 06/30/2022 07:30 AM    NRBC 0.0 07/30/2021 08:16 AM    BANDSPCT 3 06/02/2022 03:15 PM    LYMPHOPCT 4.5 06/30/2022 07:30 AM    MONOPCT 5.6 06/30/2022 07:30 AM    EOSPCT 3.5 03/05/2020 12:00 AM    BASOPCT 0.5 06/30/2022 07:30 AM    MONOSABS 0.4 06/30/2022 07:30 AM    LYMPHSABS 0.3 06/30/2022 07:30 AM    EOSABS 0.0 06/30/2022 07:30 AM    BASOSABS 0.0 06/30/2022 07:30 AM     CMP:   Lab Results   Component Value Date     06/30/2022    K 4.0 06/30/2022    CL 95 06/30/2022    CO2 30 06/30/2022    BUN 15 06/30/2022    CREATININE 4.37 (H) 06/30/2022    GLUCOSE 226 (H) 06/30/2022    CALCIUM 9.0 06/30/2022    PROT 6.2 (L) 06/30/2022    LABALBU 3.9 06/30/2022    BILITOT 1.4 (H) 06/30/2022    ALKPHOS 132 (H) 06/30/2022    AST 40 (H) 06/30/2022    ALT 25 06/30/2022    LABGLOM 10.2 (L) 06/30/2022    GFRAA 12.3 (L) 06/30/2022    GLOB 2.3 06/30/2022     Magnesium:   Lab Results   Component Value Date/Time    MG 1.8 06/30/2022 07:30 AM     Troponin:   Lab Results   Component Value Date/Time    TROPONINI 0.037 06/30/2022 07:30 AM     PT/INR:   Recent Labs     06/30/22 0730   PROTIME 16.3*   INR 1.3     APTT:   Recent Labs     06/30/22  0730   APTT 30.3     EtOH:   Lab Results   Component Value Date/Time    ETOH <10 10/11/2021 10:15 PM       RADIOLOGY: (Personally reviewed)  CXR: NO SIGNIFICANT CHANGE SINCE THE PREVIOUS EXAM. THERE HAS BEEN PLACEMENT OF A DIALYSIS CATHETER SINCE THAT EXAM.    CT Head: NO ACUTE INTRACRANIAL PROCESS OR SIGNIFICANT CHANGE FROM 10/11/2021 IDENTIFIED. CT C-Spine: SIGNIFICANT DEGENERATIVE ARTHRITIS. NO FRACTURE IS SEEN. CT RECONS: OLD COMPRESSION FRACTURE OF T11. AN ACUTE FRACTURE IS NOT SEEN. BILATERAL PLEURAL EFFUSIONS. MILD DEGENERATIVE ARTHRITIS. NO FRACTURE. CT Chest:   - MINIMALLY DISPLACED ACUTE RIGHT 10TH AND 11TH RIB FRACTURES.   - NO OTHER ACUTE FRACTURE OR SIGNIFICANT POSTTRAUMATIC COMPLICATION IDENTIFIED. CT Abdomen/Pelvis:  - MINIMALLY DISPLACED ACUTE FRACTURES OF THE POSTERIOR RIGHT 10TH AND 11TH RIBS. - NO OTHER SIGNIFICANT RECENT POSTTRAUMATIC COMPLICATION IDENTIFIED.  - CHRONIC FINDINGS, NOT SIGNIFICANTLY CHANGED FROM 4/20/2022. ASSESSMENT:  Caroline Encinas is a 57 y.o. female with a PMHx of Angina at rest, CAD s/p percutaneous coronary angioplasty w/ stents, CHF, CKD 4 d/t DMII, COPD, GERD, hemiparesis left, history of seizures, history hepatitis C, HTN, HLD, recurrent UTI, schizophrenia. S/p FFS after dialysis. (+)dizziness. (+)head strike. (-)LOC. (+)ASA. Trauma work up significant for minimally displaced acute right 10th and 11th rib fractures. Patient does not meet criteria for trauma admission based on rib fractures and stable respiratory status. No acute traumatic injuries requiring admission to trauma service. PLAN:  - acute traumatic injuries include 2 minimally displaced rib fractures not requiring admission to trauma service. Patient could have been treated as outpatient and did not require admissions for these rib fx.   - Dispo per ED  - If patient admitted by medicine, recommend IS and multimodal pain control  - Trauma will sign off.  If patient admitted, feel free to call with questions/concerns. Steven Shelby PA-C  Trauma/Critical Care/General Surgery  [phone] ([id number])  [phone]     This patient's plan of care was discussed and made in collaboration with Trauma Attending physician, Tamiko Wei MD.      Teaching Physician Note:  I saw and evaluated the patient. I personally obtained the key and critical portions of the history and physical exam.  I reviewed the EFFIE's documentation and discussed the patient with the EFFIE. I agree with the EFFIE's medical decision making as documented in the EFFIE note.       Arely Sin MD

## 2022-06-30 NOTE — H&P
[place] [address] MEDICINE    HISTORY AND PHYSICAL EXAM    PATIENT NAME:  Adin Kaplan    MRN:  [id number]  SERVICE DATE:  6/30/2022   SERVICE TIME:  11:24 AM    Primary Care Physician: Brandt Maya MD     SUBJECTIVE  CHIEF COMPLAINT: Fall, weakness, back pain    HPI:  Adin Kaplan is a 57 y.o., , female who presents with complaint of fall, weakness, back pain. PMH significant for  has a past medical history of Angina at rest [facility]), Anxiety, CAD S/P percutaneous coronary angioplasty, CHF (congestive heart failure) ([address] CKD (chronic kidney disease) stage 4, GFR 15-29 ml/min ([facility]), CKD stage 4 due to type 2 diabetes mellitus ([address] Contusion of right chest wall, COPD (chronic obstructive pulmonary disease) ([address] Diabetic nephropathy with proteinuria ([address] DJD (degenerative joint disease) of knee, GERD (gastroesophageal reflux disease), Hemiparesis, left ([address] Hemodialysis patient ([address] Hemodialysis-associated hypotension, History of heart failure, History of seizures, History of type C viral hepatitis, HTN (hypertension), Hyperlipidemia, Impaired mobility and activities of daily living, Mediastinal lymphadenopathy, Metabolic syndrome, Moderate persistent asthma without complication, Need for extended care facility, Neurogenic urinary incontinence, Neuropathy in diabetes ([address] Nonrheumatic mitral valve regurgitation, Nonrheumatic tricuspid valve regurgitation, Obesity (BMI 30-39.9), Recurrent UTI, S/P colonoscopy, Schizophrenia, paranoid, chronic ([address] Small vessel disease, cerebrovascular, Status post total knee replacement, right, Status post total left knee replacement, Thrush, 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 .      Patient is a 58-year-old female presenting to ED from home in regards to back pain after a fall a few days ago. Evaluation in the ED determined her to have right posterior 10th and 11th rib fractures. Trauma surgery was consulted, felt no surgical intervention required. Other work-up in the ED notable for elevated proBNP, chronically elevated troponin, chronically elevated INR, negative COVID and flu tests. Remainder of CT pan scan as part of trauma panel with no new acute findings. No evidence of infectious process. Baseline creatinine elevation in setting of hemodialysis, with most recent session not completed because dialysis staff stated patient was weak and confused. Mild hyperglycemia 226 on presentation to ED. Hospitalist service consulted for admission for weakness/confusion s/p fall, with suspicion for failure to thrive as outpatient. Patient is a full code. On exam, patient is found to be in no acute distress. States that she is active and using a walker intermittently for the past several years, but has felt more weak and confused in the past several days. States that she has had 2 falls at home in the past week. She complains of right posterior flank pain near site of her 2 broken ribs. States that she feels slightly lightheaded and slightly confused at present. Denies any other new/acute complaints.        PAST MEDICAL HISTORY:    Past Medical History:   Diagnosis Date    Angina at rest [facility]) 5/24/2020    Anxiety     CAD S/P percutaneous coronary angioplasty 2015, 2018    stents per dr Asha Bloom    CHF (congestive heart failure) ([address]     CKD (chronic kidney disease) stage 4, GFR 15-29 ml/min ([address] 2/24/2018    CKD stage 4 due to type 2 diabetes mellitus ([address]     Contusion of right chest wall 2/16/2021    COPD (chronic obstructive pulmonary disease) ([address]     Diabetic nephropathy with proteinuria ([address] 2014    DJD (degenerative joint disease) of knee     Dr Gregorio Acuña GERD (gastroesophageal reflux disease)     Hemiparesis, left ([address] 2013    entered Assisted Living ([facility])    Hemodialysis patient [facility])     Hemodialysis-associated hypotension 10/22/2021    History of heart failure     History of seizures     History of type C viral hepatitis     HTN (hypertension)     Hyperlipidemia     Impaired mobility and activities of daily living     [facility]     [facility]    Metabolic syndrome     Moderate persistent asthma without complication     Need for extended care facility 2021    Neurogenic urinary incontinence 2013    Neuropathy in diabetes [facility])     Nonrheumatic mitral valve regurgitation 2021    Nonrheumatic tricuspid valve regurgitation 2021    Obesity (BMI 30-39. 9)     Recurrent UTI     S/P colonoscopy     [facility], focal active colitis    Schizophrenia, paranoid, chronic ([address]     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 Brian Mckee      x1 Dr. Jeff Blackburn, Dr Nisreen Thibodeaux 2018   Vishal Mings  08/10/2021    [facility]    DIAGNOSTIC CARDIAC CATH LAB PROCEDURE  10/02/2019    DIALYSIS CATHETER INSERTION Left 2022    Tunneled Symetrex 15.5F x 23cm hemodialysis catheter inserted by Dr. Kevin Wolff, [address] (CERVIX REMOVED)      one ovary intact, Dr Malika Herrera, menorrhagia    IR THROMBECTOMY PERCUT AVF  6/6/2022    IR THROMBECTOMY PERCUT AVF 6/6/2022 [facility] PROCEDURE    IR TUNNELED CATHETER PLACEMENT GREATER THAN 5 YEARS  6/3/2022    IR TUNNELED CATHETER PLACEMENT GREATER THAN 5 YEARS 6/3/2022 [facility] PROCEDURE    OR TOTAL KNEE ARTHROPLASTY Left 06/21/2018    LEFT KNEE TOTAL KNEE ARTHROPLASTY, SHAYNA, NERVE BLOCK performed by Rosa M Maciel MD at [address] [place] [address] [facility]      TOE AMPUTATION Right     TOTAL KNEE ARTHROPLASTY  05/19/2016    Dr Gordon Carlson TUNNELED [address] Right 07/01/2020    tunneled HD catheter per Dr Alejandro Counter:    Family History   Problem Relation Age of Onset   Huy Stager Cancer Mother 76        survived   Huy Stager Breast Cancer Mother     Hypertension Father     Diabetes Sister     Mental Illness Sister     Colon Cancer Neg Hx      SOCIAL HISTORY:    Social History     Socioeconomic History    Marital status:      Spouse name: Not on file    Number of children: 2    Years of education: Not on file    Highest education level: Not on file   Occupational History    Occupation: disabled   Tobacco Use    Smoking status: Never Smoker    Smokeless tobacco: Never Used   Vaping Use    Vaping Use: Never used   Substance and Sexual Activity    Alcohol use: No     Alcohol/week: 0.0 standard drinks    Drug use: No    Sexual activity: Not Currently   Other Topics Concern    Not on file   Social History Narrative    Disabled, lives in [facility] [place]     Born in [place], one of 5    Twin sister Reyes, very ill in 2018, [place] 2019    Moved to [facility] [place], , 2 children, one son and one daughter    Worked at [employer], as a nurse's aide    Disabled due to mental illness    Lived at [facility], was discharged, returned to independent living in 2017 in the daughter's house and has adjusted well    One son and one daughter, live in the same house with patient, Elyse Glynn pays the rent    [email or web address] [place] [email or web address]             10/11/2021 [facility] updates; patient lives with her daughter son-in-law and 2 grandchildren and patient's handicapped son. Per daughter, her brother is blind, MRDD, multiple health issues. Daughter's  is patient's legal guardian. Patient has hemodialysis Tuesday Thursday and Saturday. Patient's bedroom is on main floor with a half bath. Daughter walks patient upstairs once weekly for full bath. Patient is using her walker in the home. Patient has a hospital bed in the home. Social Determinants of Health     Financial Resource Strain: Low Risk     Difficulty of Paying Living Expenses: Not hard at all   Food Insecurity: No Food Insecurity    Worried About Running Out of Food in the Last Year: Never true    Yung of Food in the Last Year: Never true   Transportation Needs: No Transportation Needs    Lack of Transportation (Medical): No    Lack of Transportation (Non-Medical):  No   Physical Activity: Sufficiently Active    Days of Exercise per Week: 3 days    Minutes of Exercise per Session: 60 min   Stress:     Feeling of Stress : Not on file   Social Connections:     Frequency of Communication with Friends and Family: Not on file    Frequency of Social Gatherings with Friends and Family: Not on file    Attends [employer] Services: Not on file    Active Member of Clubs or Organizations: Not on file    Attends Club or Organization Meetings: Not on file    Marital Status: Not on file   Intimate Partner Violence:     Fear of Current or Ex-Partner: Not on file    Emotionally Abused: Not on file    Physically Abused: Not on file    Sexually Abused: Not on file   Housing Stability:     Unable to Pay for Housing in the Last Year: Not on file    Number of [place] in the Last Year: Not on file    Unstable Housing in the Last Year: Not on file     MEDICATIONS:   Prior to Admission medications    Medication Sig Start Date End Date Taking? Authorizing Provider   ARIPiprazole (ABILIFY) 5 MG tablet TAKE 1 TABLET BY MOUTH ONCE DAILY 6/28/22   Kenny Ramirez MD   nitroGLYCERIN (NITROSTAT) 0.4 MG SL tablet up to max of 3 total doses. If no relief after 1 dose, call 911. 6/22/22   Mariah Orozco DO   furosemide (LASIX) 20 MG tablet Take 1 tablet by mouth 2 times daily  Patient taking differently: Take 100 mg by mouth 2 times daily  5/16/22   Kenny Ramirez MD   [facility] [id number] UNIT/GM powder APPLY TO AFFECTED AREA OF ABDOMINAL FOLDS EVERY 12 HOURS AS NEEDED 5/7/22   Kenny Ramirez MD   insulin aspart (NOVOLOG FLEXPEN) 100 UNIT/ML injection pen 15 UNITS PLUS SLIDING SCALE   LESS THAN 180 NONE  181-250 2 UNITS  251-300 4 UNITS  301-400 6 UNITS   401-500 10 UNITS 5/6/22   Samanta Roberson MD   isosorbide mononitrate (IMDUR) 30 MG extended release tablet Take 4 tablets by mouth daily 5/5/22   Abiodun Sanchez MD   atorvastatin (LIPITOR) 40 MG tablet TAKE 1 TABLET BY MOUTH DAILY  Patient taking differently: Take 40 mg by mouth nightly  5/2/22   Kenny Ramirez MD   magnesium oxide (MAG-OX) 400 MG tablet TAKE 1 TABLET BY MOUTH DAILY 5/2/22   Kenny Ramirez MD   potassium chloride (KLOR-CON M) 20 MEQ extended release tablet Take 2 tablets by mouth 2 times daily (with meals) 4/9/22   Abiodun Sanchez MD   melatonin 10 MG CAPS capsule Take 1 capsule by mouth nightly 3/16/22   Kevan Garsia MD   midodrine (PROAMATINE) 5 MG tablet Take 1 tablet by mouth one time a day every Tue, Thu, Sat for hypotension. Give 30 mins prior to dialysis. 3/16/22   Kevan Garsia MD   Handicap Placard MISC by Does not apply route Expiration in 5 years.  3/16/22   Kevan Garsia MD   sertraline (ZOLOFT) 50 MG tablet TAKE 1 TABLET BY MOUTH DAILY  Patient taking differently: Take 50 mg by mouth nightly  3/1/22   Kenny Ramirez MD   lidocaine-prilocaine (EMLA) 2.5-2.5 % cream Apply topically as needed for Pain Apply topically as needed. 3 times a week before dialysis wrap with stella álvarezap    Historical Provider, MD   albuterol (PROVENTIL) (2.5 MG/3ML) 0.083% nebulizer solution Take 3 mLs by nebulization every 4 hours as needed for Wheezing 12/22/21   Evelyn Reza MD   hydrOXYzine (ATARAX) 25 MG tablet TAKE ONE TABLET BY MOUTH TWO TIMES A DAY 11/13/21   Historical Provider, MD   insulin glargine (LANTUS SOLOSTAR) 100 UNIT/ML injection pen 70 UNITS AT BEDTIME 12/13/21   Иван Bosch MD   blood glucose test strips (ONETOUCH VERIO) strip QID 12/13/21   MD Ori Sheppard Delica Lancets 31Z MISC QID 12/13/21   Иван Bosch MD   Blood Glucose Monitoring Suppl (ONETOUCH VERIO) w/Device KIT AS DIRECTED 12/13/21   Иван Bosch MD   Insulin Pen Needle (SURE COMFORT PEN NEEDLES) 30G X 8 MM MISC USE AS DIRECTED FIVE TIMES A DAILY 10/20/21   Иван Bosch MD   b complex-C-folic acid (NEPHROCAPS) 1 MG capsule Take 1 capsule by mouth daily    Historical Provider, MD   LANTUS SOLOSTAR 100 UNIT/ML injection pen 55 units at bedtime 10/8/21   Иван Bosch MD   metoprolol tartrate (LOPRESSOR) 25 MG tablet TAKE 1/2 TABLET BY MOUTH TWO TIMES DAILY   Patient taking differently: Take 12.5 mg by mouth 2 times daily  9/17/21   Audrey Balderrama MD   insulin aspart (NOVOLOG FLEXPEN) 100 UNIT/ML injection pen INJECT 8-10  units with each meals 6/28/21   Иван Bosch MD   aspirin EC 81 MG EC tablet Take 1 tablet by mouth daily 5/25/21   Audrey Balderrama MD   blood glucose test strips (FREESTYLE LITE) strip 1 each by Does not apply route 4 times daily (before meals and nightly) As needed.  3/12/21   MARCELO Morales   Alcohol Swabs (EASY TOUCH ALCOHOL PREP MEDIUM) 70 % PADS USE AS DIRECTED THREE TIMES A DAY 3/12/21   MARCELO Morales   FreeStyle Lancets MISC Test 4x daily 3/11/21   MARCELO Morales   acetaminophen (TYLENOL) 325 MG tablet Take 2 tablets by mouth every 4 hours as needed for Pain (For mild to moderate pain (Pain 1-6 out of 10 on pain scale)) 2/24/21   Lena Dixon PA-C   Blood Glucose Monitoring Suppl (FREESTYLE LITE) CORETTA 1 Device by Does not apply route daily as needed (Diabetes) Use freestyle meter to test blood sugar as needed 12/29/20   MARCELO Boucher   nitroGLYCERIN (NITROSTAT) 0.4 MG SL tablet Place 1 tablet under the tongue every 5 minutes as needed for Chest pain 9/22/15   Historical Provider, MD       ALLERGIES: Codeine and Oxycontin [oxycodone hcl]    REVIEW OF SYSTEM:   A full 12 point review of systems was completed, and was unremarkable except as specified above. OBJECTIVE    BP (!) 117/54   Pulse 76   Temp 98.8 °F (37.1 °C)   Resp 18   Ht 5' 7\" (1.702 m)   Wt 217 lb (98.4 kg)   LMP  (LMP Unknown)   SpO2 98%   BMI 33.99 kg/m²     PHYSICAL EXAM:   Constitutional: Obese elderly adult female reclining in ED bed in no acute distress  Head: NCAT  Eyes: PERRLA, EOMI  Neck: Trachea midline, phonation normal  Cardiovascular: RRR. No significant murmurs appreciated. 1+ bilateral pretibial edema. RUE AV fistula site noted. Pulmonary: Normal rate and effort of respiration on room air. Distant lung sounds secondary to habitus, however no significant adventitious lung sounds appreciated. No coughing during exam.  Abdomen: Obese, soft, nontense. Hypoactive bowel sounds. Grossly nontender to palpation. Neurologic: Alert, grossly oriented. Intermittent mild confusion. Follows all commands. Nonfocal.  Psychiatric: Pleasant and cooperative. Mildly anxious. MSK/integumentary: Right posterior costal cage tenderness, without significant bony displacement noted. DATA:     Diagnostic tests reviewed for today's visit:    Most recent labs and imaging results reviewed.      LABS:    Recent Results (from the past 24 hour(s))   Lactic Acid    Collection Time: 06/30/22  7:30 AM   Result Value Ref Range    Lactic Acid 2.5 (H) 0.5 - 2.2 mmol/L   APTT    Collection Time: 06/30/22  7:30 AM   Result Value Ref Range    aPTT 30.3 24.4 - 36.8 sec   Brain Natriuretic Peptide    Collection Time: 06/30/22  7:30 AM   Result Value Ref Range    Pro-BNP 30,364 pg/mL   CBC with Auto Differential    Collection Time: 06/30/22  7:30 AM   Result Value Ref Range    WBC 7.3 4.8 - 10.8 K/uL    RBC 2.83 (L) 4.20 - 5.40 M/uL    Hemoglobin 9.1 (L) 12.0 - 16.0 g/dL    Hematocrit 26.5 (L) 37.0 - 47.0 %    MCV 93.6 82.0 - 100.0 fL    MCH 32.4 (H) 27.0 - 31.3 pg    MCHC 34.6 33.0 - 37.0 %    RDW 15.1 (H) 11.5 - 14.5 %    Platelets 92 (L) 794 - 400 K/uL    Neutrophils % 89.0 %    Lymphocytes % 4.5 %    Monocytes % 5.6 %    Eosinophils % 0.4 %    Basophils % 0.5 %    Neutrophils Absolute 6.5 1.4 - 6.5 K/uL    Lymphocytes Absolute 0.3 (L) 1.0 - 4.8 K/uL    Monocytes Absolute 0.4 0.2 - 0.8 K/uL    Eosinophils Absolute 0.0 0.0 - 0.7 K/uL    Basophils Absolute 0.0 0.0 - 0.2 K/uL   CK    Collection Time: 06/30/22  7:30 AM   Result Value Ref Range    Total CK 28 0 - 170 U/L   Comprehensive Metabolic Panel    Collection Time: 06/30/22  7:30 AM   Result Value Ref Range    Sodium 135 135 - 144 mEq/L    Potassium 4.0 3.4 - 4.9 mEq/L    Chloride 95 95 - 107 mEq/L    CO2 30 20 - 31 mEq/L    Anion Gap 10 9 - 15 mEq/L    Glucose 226 (H) 70 - 99 mg/dL    BUN 15 8 - 23 mg/dL    CREATININE 4.37 (H) 0.50 - 0.90 mg/dL    GFR Non-African American 10.2 (L) >60    GFR  12.3 (L) >60    Calcium 9.0 8.5 - 9.9 mg/dL    Total Protein 6.2 (L) 6.3 - 8.0 g/dL    Albumin 3.9 3.5 - 4.6 g/dL    Total Bilirubin 1.4 (H) 0.2 - 0.7 mg/dL    Alkaline Phosphatase 132 (H) 40 - 130 U/L    ALT 25 0 - 33 U/L    AST 40 (H) 0 - 35 U/L    Globulin 2.3 2.3 - 3.5 g/dL   Magnesium    Collection Time: 06/30/22  7:30 AM   Result Value Ref Range    Magnesium 1.8 1.7 - 2.4 mg/dL   Protime-INR    Collection Time: 06/30/22  7:30 AM   Result Value Ref Range    Protime 16.3 (H) 12.3 - 14.9 sec    INR 1.3    Troponin Collection Time: 06/30/22  7:30 AM   Result Value Ref Range    Troponin 0.037 (HH) 0.000 - 0.010 ng/mL   TSH    Collection Time: 06/30/22  7:30 AM   Result Value Ref Range    TSH 0.513 0.440 - 3.860 uIU/mL   Ammonia    Collection Time: 06/30/22  7:30 AM   Result Value Ref Range    Ammonia 15 11 - 51 umol/L   EKG 12 Lead    Collection Time: 06/30/22  7:44 AM   Result Value Ref Range    Ventricular Rate 74 BPM    Atrial Rate 74 BPM    P-R Interval 230 ms    QRS Duration 128 ms    Q-T Interval 456 ms    QTc Calculation (Bazett) 506 ms    P Axis 7 degrees    R Axis -55 degrees    T Axis 83 degrees   COVID-19, Rapid    Collection Time: 06/30/22  8:10 AM    Specimen: Nasopharyngeal Swab   Result Value Ref Range    SARS-CoV-2, NAAT Not Detected Not Detected   Rapid Influenza A/B Antigens    Collection Time: 06/30/22  8:10 AM    Specimen: Nasopharyngeal   Result Value Ref Range    Influenza A by PCR Negative     Influenza B by PCR Negative    C-Reactive Protein    Collection Time: 06/30/22  8:17 AM   Result Value Ref Range    CRP 26.8 (H) 0.0 - 5.0 mg/L   High sensitivity CRP    Collection Time: 06/30/22  8:17 AM   Result Value Ref Range    CRP High Sensitivity 34.4 (H) 0.0 - 5.0 mg/L       IMAGING:  CT ABDOMEN PELVIS WO CONTRAST Additional Contrast? None    Result Date: 6/30/2022  CT ABDOMEN PELVIS WO CONTRAST: 6/30/2022 CLINICAL HISTORY:  severe low back and right flank pain after fall; assess for retroperitoneal hematoma . COMPARISON: 4/20/2022. TECHNIQUE: Spiral images were obtained of the abdomen and pelvis without contrast. All CT scans at this facility use dose modulation, iterative reconstruction, and/or weight based dosing when appropriate to reduce radiation dose to as low as reasonably achievable. FINDINGS: Minimally displaced acute fractures of the posterior right 10th and 11th ribs are noted. A small right pleural effusion has mildly decreased in size from the prior study.  There is no organized hematoma, other displaced fractures, evidence of solid organ injury (within the limits of a noncontrast study), or other significant changes from 4/20/2022 identified. A small to moderate-sized left pleural effusion, mild probable scarring and/or atelectasis of the visualized lung bases, a moderate compression fracture of T11, and other previously described chronic findings appear unchanged. MINIMALLY DISPLACED ACUTE FRACTURES OF THE POSTERIOR RIGHT 10TH AND 11TH RIBS. NO OTHER SIGNIFICANT RECENT POSTTRAUMATIC COMPLICATION IDENTIFIED. CHRONIC FINDINGS, NOT SIGNIFICANTLY CHANGED FROM 4/20/2022. XR CHEST (2 VW)    Result Date: 6/30/2022  EXAMINATION:  CHEST. CLINICAL HISTORY:  CONFUSION. COMPARISONS:  6/2/2022. TECHNIQUE:  AP and lateral. FINDINGS: There is mild to moderate cardiomegaly. No definite evidence of pulmonary venous congestion. Chronic changes are noted in the left lower thorax. There is a dialysis catheter in place. There is a valve prosthesis in place. There are small pleural effusions or blunting of both costophrenic angles. NO SIGNIFICANT CHANGE SINCE THE PREVIOUS EXAM. THERE HAS BEEN PLACEMENT OF A DIALYSIS CATHETER SINCE THAT EXAM.     CT HEAD WO CONTRAST    Result Date: 6/30/2022  CT HEAD WO CONTRAST : 6/30/2022 CLINICAL HISTORY:  fall from standing last night confused at dialysis c/o back pain . COMPARISON: 10/11/2021. TECHNIQUE: Spiral unenhanced images were obtained of the head, with routine multiplanar reconstructions performed. All CT scans at this facility use dose modulation, iterative reconstruction, and/or weight based dosing when appropriate to reduce radiation dose to as low as reasonably achievable. FINDINGS: There is no intracranial hemorrhage, mass effect, midline shift, extra-axial collection, evidence of hydrocephalus, recent ischemic infarct, or skull fracture identified. Chronic volume loss and mild mucosal thickening is again noted within the maxillary sinuses.  The mastoid air the interspaces. No fracture is seen. No prevertebral soft tissue swelling. SIGNIFICANT DEGENERATIVE ARTHRITIS. NO FRACTURE IS SEEN. CT THORACIC SPINE WO CONTRAST    Result Date: 6/30/2022  EXAMINATION:  CT THORACIC SPINE. CLINICAL HISTORY:  TRAUMA. COMPARISONS:  NONE AVAILABLE TECHNIQUE:   Serial 2.5 mm scans. No contrast.  FINDINGS:  There is mild thoracic kyphoscoliosis. There is a compression fracture of T11 with some anterior wedging. Radiographic lead this appears to be old rather than acute. No other fracture is seen. There is mild to moderate degenerative arthritis. We note a small pleural effusion on the right and a moderate sized effusion on the left. OLD COMPRESSION FRACTURE OF T11. AN ACUTE FRACTURE IS NOT SEEN. BILATERAL PLEURAL EFFUSIONS. CT LUMBAR SPINE WO CONTRAST    Result Date: 6/30/2022  EXAMINATION:  CT LUMBAR SPINE. CLINICAL HISTORY:  TRAUMA. COMPARISONS:  NONE AVAILABLE TECHNIQUE:  Serial 2.5 mm scans. No contrast. FINDINGS:  Vertebral bodies are in fairly normal alignment. Interspaces are fairly well maintained. There is mild degenerative arthritis. No fracture is seen. MILD DEGENERATIVE ARTHRITIS. NO FRACTURE.          ASSESSMENT AND PLAN    Active problems:  · Weakness, fall -with history of multiple prior falls  · Right posterior 10th and 11th rib fractures, minimally displaced, with associated back pain  · Confusion/AMS in setting of above    Chronic problems:  · ESRD on HD (most recent treatment not completed)  · DM2 with neuropathy and nephropathy (50 units Lantus nightly and high correction sliding scale plus supplemental short acting with meals at home)  · Obesity  · Chronic anemia  · Hx seizures  · Hx CVA with baseline left-sided weakness  · Reported Hx tardive dyskinesia  · [place]  · Pulmonary hypertension  · Obesity  · Paranoid schizophrenia  · Moderate persistent asthma  · CAD s/p stenting and CABG  · Chronically elevated troponin, at baseline level  · Chronically elevated INR, baseline level      Plan:  · Place in observation status  · Nephrology consulted for hemodialysis management  · Acute inpatient rehabilitation consult for inpatient rehab appropriateness  · PT/OT consult for evaluation and treatment  · Fall precautions  · N.p.o. except sips/chips and selected meds, s/p choking event with first post-admission meal, pending SLP swallowing evaluation and modified barium swallow test  · When p.o. intake resumed: Adult cardiac diabetic renal diet (plus any modifications per ST recommendations)  · Lantus 35 units nightly, with medium sliding scale every 4 hours while NPO, transition to [facility] [place], operated by [facility] 3 times daily and nightly when p.o. intake resumed. · Heparin TID for DVT prophylaxis  · Continue/hold home medications as ordered after medication history completed. Myrna Torres DO  DATE: June 30, 2022  TIME: 11:24 AM       Addendum:  After presenting to the medical floor from the ED, patient had an episode of choking while eating, followed by observed hypoxia to mid 70s on bedside monitoring with cyanotic lips at the time per staff present in the room during the event. She was placed on supplemental oxygen. Portable 1 view chest x-ray was ordered, which demonstrated no acute change from previous and no evidence of acute/large volume aspiration. Patient was made NPO. Speech therapy was consulted and modified barium swallow ordered. Continuous pulse ox ordered. Cardiac telemetry ordered. O2 as needed to maintain SPO2 greater than 92%.     Electronically signed by Meme Mujica DO on 6/30/2022 at 4:27 PM

## 2022-06-30 NOTE — PROGRESS NOTES
[facility]   Facility/Department: [address] Esthela Milligan  Speech Language Pathology  Clinical Bedside Swallow Evaluation    NAME:Michelle Wright [place]  : 1960 (57 y.o.)   [x]   confirmed    MRN: [id number]  ROOM: [place] [id number]  ADMISSION DATE: 2022  PATIENT DIAGNOSIS(ES): Weakness [R53.1]  Physical deconditioning [R53.81]  Bilateral pleural effusion [J90]  At high risk for injury related to fall [Z91.81]  Concussion without loss of consciousness, initial encounter [S06.0X0A]  Fall from standing, initial encounter [W19. XXXA]  Chronic renal failure, stage 5 (HCC) [N18.5]  Closed fracture of multiple ribs of right side, initial encounter [S22.41XA]  Class 1 obesity due to excess calories with serious comorbidity and body mass index (BMI) of 33.0 to 33.9 in adult [E66.09, Z68.33]  Chief Complaint   Patient presents with    Back Pain     since fall last night from standing     Patient Active Problem List    Diagnosis Date Noted    Unstable angina ([address] 2022    Chest pain     Impaired mobility and activities of daily living 2022    Closed T11 fracture ([address] 2022    Encephalopathy acute 2022    Unspecified open wound of right upper arm, initial encounter 2022    Hyperkalemia 2022    CKD (chronic kidney disease) stage 4, GFR 15-29 ml/min ([address] 2022    Dialysis patient ([address] 2022    Multiple closed fractures of right lower extremity and ribs 2022    NSTEMI (non-ST elevated myocardial infarction) ([address] 2022    Anginal chest pain at rest [facility]) 2022    Hemodialysis-associated hypotension 10/22/2021    Chronic pain of both shoulders 2021    Nonrheumatic mitral valve regurgitation 2021    Nonrheumatic tricuspid valve regurgitation 2021    Need for extended care facility 2021    Multiple closed fractures of ribs of right side 2021    Depression 2021    [facility] 06/05/2013    Cerebral microvascular disease 06/05/2013    Hemiparesis, left ([address] 01/01/2013    Schizophrenia, paranoid, chronic     Metabolic syndrome     Mixed hyperlipidemia 12/09/2010    Other hammer toe (acquired) 12/13/2007     Past Medical History:   Diagnosis Date    Angina at rest [facility]) 5/24/2020    Anxiety     CAD S/P percutaneous coronary angioplasty 2015, 2018    stents per dr Blane Godinez    CHF (congestive heart failure) ([address]     CKD (chronic kidney disease) stage 4, GFR 15-29 ml/min ([address] 2/24/2018    CKD stage 4 due to type 2 diabetes mellitus ([address]     Contusion of right chest wall 2/16/2021    COPD (chronic obstructive pulmonary disease) ([address]     Diabetic nephropathy with proteinuria ([address] 2014    DJD (degenerative joint disease) of knee     Dr Cecille Biggs GERD (gastroesophageal reflux disease)     Hemiparesis, left ([address] 2013    entered Assisted Living ([facility])    Hemodialysis patient [facility])     Hemodialysis-associated hypotension 10/22/2021    History of heart failure     History of seizures     History of type C viral hepatitis     HTN (hypertension)     Hyperlipidemia     Impaired mobility and activities of daily living     Mediastinal lymphadenopathy 2013    Alphonso Hernández    Metabolic syndrome     Moderate persistent asthma without complication 2/37/1675    Need for extended care facility 7/7/2021    Neurogenic urinary incontinence 2013    Neuropathy in diabetes [facility])     Nonrheumatic mitral valve regurgitation 7/7/2021    Nonrheumatic tricuspid valve regurgitation 7/7/2021    Obesity (BMI 30-39. 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 Shabnam Lemons      x1 Dr. Lea Jay, Dr Anneliese Del Toro  08/10/2021    [facility]    DIAGNOSTIC CARDIAC CATH LAB PROCEDURE  10/02/2019    DIALYSIS CATHETER INSERTION Left 2022    Tunneled Symetrex 15.5F x 23cm hemodialysis catheter inserted by Dr. Suha Liang, TOTAL ABDOMINAL (CERVIX REMOVED)      one ovary intact, Dr Mitul Gallagher, menorrhagia    IR THROMBECTOMY PERCUT AVF  2022    IR THROMBECTOMY PERCUT AVF 2022 [facility] PROCEDURE    IR TUNNELED CATHETER PLACEMENT GREATER THAN 5 YEARS  6/3/2022    IR TUNNELED CATHETER PLACEMENT GREATER THAN 5 YEARS 6/3/2022 [facility] PROCEDURE    OH TOTAL KNEE ARTHROPLASTY Left 2018    LEFT KNEE TOTAL KNEE ARTHROPLASTY, SHAYNA, NERVE BLOCK performed by Kayden Edward MD at [address] [place] [address] [facility]      TOE AMPUTATION Right     TOTAL KNEE ARTHROPLASTY  2016    Dr Naseem Strickland TUNNELED [address] Right 2020    tunneled HD catheter per Dr Malathi haider    Oxycontin [Oxycodone Hcl] Hives       DATE ONSET: 2022    Date of Evaluation: 2022   Evaluating Therapist: ANN MARIE Hopper    Dysphagia Diagnosis  Dysphagia Diagnosis: Concerns for esophageal stage dysphagia; Suspected needs further assessment;Mild oral stage dysphagia; Severe pharyngeal stage dysphagia  Dysphagia Impression : Clinical indicators of oral and pharyngeal dysphagia noted at bedside, likely acute-on-chronic of unknown etiology given patient's report of similar occurances at home \"for a while. \" Prognosis for improvement is not known at this time and is pending additional testing. A PO diet is not recommended at this time. An instrumental swallowing procedure is recommended to objectively assess the pharyngeal stage of the swallow. Referral to GI should also be considered. Recommended Diet  Recommendations: NPO;Modified barium swallow study;Consider ice chips PRN;Dysphagia treatment  Referral To: Dietician;GI  Diet Solids Recommendation: NPO  Liquid Consistency Recommendation: NPO  Recommended Form of Meds: Via alternative means of nutrition     Reason for Referral  Homar Mesa was referred for a bedside swallow evaluation to assess the efficiency of her swallow function, identify signs and symptoms of aspiration, identify risk factors, and make recommendations regarding safe dietary consistencies, effective compensatory strategies, and safe eating environment. General  Chart Reviewed: Yes  Subjective  Subjective: Rapid response recently called due to hypoxia occuring while eating. Patient reports avoiding some solid foods at home due to edentulous state but does have difficulty swallowing \"sometimes. \"  Behavior/Cognition: Alert; Cooperative;Pleasant mood  Respiratory Status: O2 via nasual cannula  O2 Device: Nasal cannula  Liters of Oxygen: 3 L  Communication Observation: Functional  Follows Directions: Simple  Dentition: Edentulous  Patient Positioning: Upright in bed  Baseline Vocal Quality: Hoarse;Dysphonic  Volitional Cough: Strong  Prior Dysphagia History: None documented in history but patient reports some difficulty swallowing various consistencies \"for a while\"  Consistencies Administered:  Thin - straw;Pureed    Vision and Hearing  Vision  Vision: Impaired  Vision Exceptions: Wears glasses at all times (not present currently)  Hearing  Hearing: Within functional limits    Current Diet level  Current Diet : NPO  Current Liquid Diet : NPO    Oral Motor  Labial: No impairment  Dentition: Edentulous  Oral Hygiene: Moist;Clean  Lingual: Decreased strength  Velum: No Impairment  Mandible: No impairment  Gag:  (DNT)    Oral/Pharyngeal Phase  Oral Phase - Comment: Mild oral dysphagia characterized by decreased lingual strength with suspected delayed A-P transit and bolus control with premature bolus loss to the pharynx also suspected. No significant oral residuals post swallow per consistencies tested. Pharyngeal Phase: Suspected pharyngeal dysphagia characterized by clinical signs of aspiration at bedside suspected due to retrograde bolus flow. Patient consumed consecutive sips of thin liquid via straw and single bite of pureed solids via teaspoon. Patient able to converse immediately afterward with clear but hoarse vocal quality (baseline for evaluation), however approximately 20-30 seconds later audible glottal sounds noted followed by regurgitation of each consistency into oral cavity and expelled into emesis basis, thought it should be noted that it was not the entire bolus. Patient reports this has been occuring at home \"for a while. \" Esophogeal impairment is also suspected. PO Trials  Neuromuscular Estim Used: No  Assessment Method(s): Observation  Patient Position: Upright in bed  Vocal Quality: Hoarse  Consistency Presented: Thin  How Presented: Successive Swallows;Straw;SLP-fed/Presented  How much presented:  (three consecutive sips)  Bolus Acceptance: No impairment  Bolus Formation/Control: Impaired  Type of Impairment: Suspected premature spilling  Propulsion: No impairment  Oral Residue: None  Initiation of Swallow:  (no impairment suspected)  Laryngeal Elevation:  (present)  Aspiration Signs/Symptoms: Facial redness; Watery eyes;Strong cough;Delayed cough/throat clear (occured approximately 30 seconds following intake)  Pharyngeal Phase Characteristics: Altered vocal quality; Double swallow  Effective Modifications: None  Additional PO Trials: 1      PO Trials 2  Consistency Presented: Pureed  How Presented: Self-fed/presented;Spoon  How much presented:  (1 tsp)  Bolus Acceptance: No impairment  Bolus Formation/Control: Impaired  Type of Impairment: Suspected premature spilling;Delayed  Propulsion: Tongue pumping  Oral Residue: None  Initiation of Swallow:  (no impairment suspected)  Aspiration Signs/Symptoms: Strong cough; Facial redness;Delayed cough/throat clear;Change of vocal quality  Pharyngeal Phase Characteristics: Altered vocal quality; Double swallow  Effective Modifications: None    Dysphagia Diagnosis  Dysphagia Diagnosis: Concerns for esophageal stage dysphagia; Suspected needs further assessment;Mild oral stage dysphagia; Severe pharyngeal stage dysphagia  Dysphagia Impression : Clinical indicators of oral and pharyngeal dysphagia noted at bedside, likely acute-on-chronic of unknown etiology given patient's report of similar occurances at home \"for a while. \" Prognosis for improvement is not known at this time and is pending additional testing. A PO diet is not recommended at this time. An instrumental swallowing procedure is recommended to objectively assess the pharyngeal stage of the swallow. Referral to GI should also be considered. Dysphagia Outcome Severity Scale: Level 1: Severe dysphagia- NPO. Unable to tolerate any PO safely     Recommendations  Requires SLP Intervention: Yes  Recommendations: NPO;Modified barium swallow study;Consider ice chips PRN;Dysphagia treatment  Referral To: Dietician;GI  D/C Recommendations: To be determined  Diet Solids Recommendation: NPO  Liquid Consistency Recommendation: NPO  Recommended Form of Meds: Via alternative means of nutrition  Therapeutic Interventions: Oral motor exercises; Patient/Family education  Duration of Treatment: LOS or until goals are met  Frequency of Treatment: 2-4x/week for LOS or until goals are met    Prognosis  Speech Therapy Prognosis  Prognosis:  (not yet known)  Prognosis Considerations: Medical Diagnosis    Education  Individuals consulted  Consulted and agree with results and recommendations: Patient;RN  RN Name: MARTHA Youssef    Treatment/Goals  Short-term Goals  Timeframe for Short-term Goals: 1 week  Goal 1: Pt will complete lingual exercises that promote anterior to posterior propulsion of bolus and improve tongue base retraction with 80% accuracy in order to strengthen the muscles of the swallow to decrease risk of aspiration and to increase ability to safely handle the least restrictive diet level. Goal 2: Pt will participate in an instrumental swallowing procedure within 1-3 days to objectively assess the pharyngeal stage of the swallow. Long-term Goals  Timeframe for Long-term Goals: 1 week  Goal 1: Patient will tolerate the least restrictive diet without adverse outcomes. Dysphagia Goals:  The patient will tolerate instrumental swallowing procedure    Safety Devices  Safety Devices  Safety Devices in place: Yes  Type of devices: Bed alarm in place;Call light within reach  Restraints Initially in Place: No    Pain Assessment  Pain Assessment: Patient does not c/o pain    Pain Re-assessment  Pain Reassessment: Patient does not c/o pain      Therapy Time  SLP Individual Minutes  Time In: 1310  Time Out: [place]  Minutes: 12          Signature: Electronically signed by ANN MARIE Nava on 6/30/2022 at 3:49 PM

## 2022-06-30 NOTE — ED TRIAGE NOTES
Patient to ED via [address] for c/o right side backpain 10/10 r/t fall last night at home from standing. Patient was at dialysis today at the [facility] and they became concerned when patient appeared unsteady. Patient did not complete dialysis today. Skin p/w/d. Respirations even and unlabored. No acute distress noted at this time.

## 2022-06-30 NOTE — FLOWSHEET NOTE
Was in Pt room doing her admission and her lunch tray came so assisted her with the set up and continued to do her admission. While eating meat loaf( she has no teeth)She started to cough. I asked her if she has a problem eating and she stated I cough all the time. Pt began coughing more and more and her voice was getting raspy. Removed tray from her and texted Dr. Shona Cruz to tell him her home medications are done and also to tell him about her coughing. When the PCA states the pt is 65% and is purple around the lip. O2 sat up to 985 In room air but started to de sat again O2 applied at 3 l/nc and called a rapid response. Compazine was given by RN and chest x ray done. Speech came in and did swallow eval and said to keep her NPO  When she swallows it comes right back up again. That she needs a barium swallow tomorrow . Pt resting now tele monitor applied . Electronically signed by James Ramirez LPN on 2/25/3370 at 9:97 PM

## 2022-07-01 ENCOUNTER — HOSPITAL ENCOUNTER (OUTPATIENT)
Dept: INTERVENTIONAL RADIOLOGY/VASCULAR | Age: 62
Setting detail: OBSERVATION
Discharge: HOME | DRG: 314 | End: 2022-07-03
Payer: MEDICARE

## 2022-07-01 ENCOUNTER — APPOINTMENT (OUTPATIENT)
Dept: GENERAL RADIOLOGY | Age: 62
DRG: 314 | End: 2022-07-01
Payer: MEDICARE

## 2022-07-01 VITALS
HEART RATE: 85 BPM | OXYGEN SATURATION: 100 % | SYSTOLIC BLOOD PRESSURE: 107 MMHG | RESPIRATION RATE: 17 BRPM | DIASTOLIC BLOOD PRESSURE: 57 MMHG

## 2022-07-01 PROBLEM — B95.62 MRSA BACTEREMIA: Status: ACTIVE | Noted: 2022-07-01

## 2022-07-01 PROBLEM — R78.81 MRSA BACTEREMIA: Status: ACTIVE | Noted: 2022-07-01

## 2022-07-01 LAB
ACINETOBACTER CALCOAC BAUMANNII COMPLEX BY PCR: NOT DETECTED
ALBUMIN SERPL-MCNC: 3.7 G/DL (ref 3.5–4.6)
ALP BLD-CCNC: 126 U/L (ref 40–130)
ALT SERPL-CCNC: 23 U/L (ref 0–33)
ANION GAP SERPL CALCULATED.3IONS-SCNC: 12 MEQ/L (ref 9–15)
AST SERPL-CCNC: 28 U/L (ref 0–35)
BACTEROIDES FRAGILIS BY PCR: NOT DETECTED
BASOPHILS ABSOLUTE: 0 K/UL (ref 0–0.2)
BASOPHILS RELATIVE PERCENT: 0.4 %
BILIRUB SERPL-MCNC: 1.3 MG/DL (ref 0.2–0.7)
BLOOD CULTURE, ROUTINE: ABNORMAL
BUN BLDV-MCNC: 27 MG/DL (ref 8–23)
CALCIUM SERPL-MCNC: 9.6 MG/DL (ref 8.5–9.9)
CANDIDA ALBICANS BY PCR: NOT DETECTED
CANDIDA AURIS BY PCR: NOT DETECTED
CANDIDA GLABRATA BY PCR: NOT DETECTED
CANDIDA KRUSEI BY PCR: NOT DETECTED
CANDIDA PARAPSILOSIS BY PCR: NOT DETECTED
CANDIDA TROPICALIS BY PCR: NOT DETECTED
CHLORIDE BLD-SCNC: 96 MEQ/L (ref 95–107)
CO2: 30 MEQ/L (ref 20–31)
CREAT SERPL-MCNC: 5.48 MG/DL (ref 0.5–0.9)
CRYPTOCOCCUS NEOFORMANS/GATTII BY PCR: NOT DETECTED
CULTURE, BLOOD 2: ABNORMAL
ENTEROBACTER CLOACAE COMPLEX BY PCR: NOT DETECTED
ENTEROBACTERALES BY PCR: NOT DETECTED
ENTEROCOCCUS FAECALIS BY PCR: DETECTED
ENTEROCOCCUS FAECIUM BY PCR: NOT DETECTED
EOSINOPHILS ABSOLUTE: 0.1 K/UL (ref 0–0.7)
EOSINOPHILS RELATIVE PERCENT: 2 %
ESCHERICHIA COLI BY PCR: NOT DETECTED
GFR AFRICAN AMERICAN: 9.5
GFR NON-AFRICAN AMERICAN: 7.8
GLOBULIN: 2.6 G/DL (ref 2.3–3.5)
GLUCOSE BLD-MCNC: 151 MG/DL (ref 70–99)
GLUCOSE BLD-MCNC: 169 MG/DL (ref 70–99)
GLUCOSE BLD-MCNC: 175 MG/DL (ref 70–99)
GLUCOSE BLD-MCNC: 183 MG/DL (ref 70–99)
GLUCOSE BLD-MCNC: 233 MG/DL (ref 70–99)
HAEMOPHILUS INFLUENZAE BY PCR: NOT DETECTED
HCT VFR BLD CALC: 28.4 % (ref 37–47)
HEMOGLOBIN: 9.7 G/DL (ref 12–16)
KLEBSIELLA AEROGENES BY PCR: NOT DETECTED
KLEBSIELLA OXYTOCA BY PCR: NOT DETECTED
KLEBSIELLA PNEUMONIAE GROUP BY PCR: NOT DETECTED
LISTERIA MONOCYTOGENES BY PCR: NOT DETECTED
LYMPHOCYTES ABSOLUTE: 0.8 K/UL (ref 1–4.8)
LYMPHOCYTES RELATIVE PERCENT: 14.5 %
MCH RBC QN AUTO: 32.2 PG (ref 27–31.3)
MCHC RBC AUTO-ENTMCNC: 34.3 % (ref 33–37)
MCV RBC AUTO: 93.9 FL (ref 82–100)
METHICILLIN RESISTANCE MECA/C  BY PCR: DETECTED
MONOCYTES ABSOLUTE: 0.5 K/UL (ref 0.2–0.8)
MONOCYTES RELATIVE PERCENT: 8.2 %
NEISSERIA MENINGITIDIS BY PCR: NOT DETECTED
NEUTROPHILS ABSOLUTE: 4.3 K/UL (ref 1.4–6.5)
NEUTROPHILS RELATIVE PERCENT: 74.9 %
PDW BLD-RTO: 15.2 % (ref 11.5–14.5)
PERFORMED ON: ABNORMAL
PLATELET # BLD: 80 K/UL (ref 130–400)
PLATELET SLIDE REVIEW: ABNORMAL
POTASSIUM REFLEX MAGNESIUM: 4.8 MEQ/L (ref 3.4–4.9)
PROTEUS SPECIES BY PCR: NOT DETECTED
PSEUDOMONAS AERUGINOSA BY PCR: NOT DETECTED
RBC # BLD: 3.03 M/UL (ref 4.2–5.4)
SALMONELLA SPECIES BY PCR: NOT DETECTED
SEDIMENTATION RATE, ERYTHROCYTE: 48 MM (ref 0–30)
SERRATIA MARCESCENS BY PCR: NOT DETECTED
SODIUM BLD-SCNC: 138 MEQ/L (ref 135–144)
STAPHYLOCOCCUS AUREUS BY PCR: NOT DETECTED
STAPHYLOCOCCUS EPIDERMIDIS BY PCR: DETECTED
STAPHYLOCOCCUS LUGDUNENSIS BY PCR: NOT DETECTED
STAPHYLOCOCCUS SPECIES BY PCR: DETECTED
STENOTROPHOMONAS MALTOPHILIA BY PCR: NOT DETECTED
STREPTOCOCCUS AGALACTIAE BY PCR: NOT DETECTED
STREPTOCOCCUS PNEUMONIAE BY PCR: NOT DETECTED
STREPTOCOCCUS PYOGENES  BY PCR: NOT DETECTED
STREPTOCOCCUS SPECIES BY PCR: NOT DETECTED
TOTAL PROTEIN: 6.3 G/DL (ref 6.3–8)
VANCOMYCIN RESISTANT BY PCR: NOT DETECTED
WBC # BLD: 5.7 K/UL (ref 4.8–10.8)

## 2022-07-01 PROCEDURE — 96365 THER/PROPH/DIAG IV INF INIT: CPT

## 2022-07-01 PROCEDURE — 2580000003 HC RX 258: Performed by: INTERNAL MEDICINE

## 2022-07-01 PROCEDURE — 99221 1ST HOSP IP/OBS SF/LOW 40: CPT | Performed by: PHYSICAL MEDICINE & REHABILITATION

## 2022-07-01 PROCEDURE — 87077 CULTURE AEROBIC IDENTIFY: CPT

## 2022-07-01 PROCEDURE — 36415 COLL VENOUS BLD VENIPUNCTURE: CPT

## 2022-07-01 PROCEDURE — 5A1D70Z PERFORMANCE OF URINARY FILTRATION, INTERMITTENT, LESS THAN 6 HOURS PER DAY: ICD-10-PCS | Performed by: INTERNAL MEDICINE

## 2022-07-01 PROCEDURE — 96366 THER/PROPH/DIAG IV INF ADDON: CPT

## 2022-07-01 PROCEDURE — 36589 REMOVAL TUNNELED CV CATH: CPT

## 2022-07-01 PROCEDURE — 2709999900 IR REMOVE TUNNELED CVAD WO SQ PORT/PUMP

## 2022-07-01 PROCEDURE — 87186 SC STD MICRODIL/AGAR DIL: CPT

## 2022-07-01 PROCEDURE — G0378 HOSPITAL OBSERVATION PER HR: HCPCS

## 2022-07-01 PROCEDURE — 0JPT3XZ REMOVAL OF TUNNELED VASCULAR ACCESS DEVICE FROM TRUNK SUBCUTANEOUS TISSUE AND FASCIA, PERCUTANEOUS APPROACH: ICD-10-PCS | Performed by: RADIOLOGY

## 2022-07-01 PROCEDURE — 96372 THER/PROPH/DIAG INJ SC/IM: CPT

## 2022-07-01 PROCEDURE — 90935 HEMODIALYSIS ONE EVALUATION: CPT

## 2022-07-01 PROCEDURE — 92611 MOTION FLUOROSCOPY/SWALLOW: CPT

## 2022-07-01 PROCEDURE — 6360000002 HC RX W HCPCS: Performed by: INTERNAL MEDICINE

## 2022-07-01 PROCEDURE — 2500000003 HC RX 250 WO HCPCS: Performed by: RADIOLOGY

## 2022-07-01 PROCEDURE — 6370000000 HC RX 637 (ALT 250 FOR IP): Performed by: INTERNAL MEDICINE

## 2022-07-01 PROCEDURE — APPSS45 APP SPLIT SHARED TIME 31-45 MINUTES: Performed by: NURSE PRACTITIONER

## 2022-07-01 PROCEDURE — 80053 COMPREHEN METABOLIC PANEL: CPT

## 2022-07-01 PROCEDURE — 87070 CULTURE OTHR SPECIMN AEROBIC: CPT

## 2022-07-01 PROCEDURE — 85025 COMPLETE CBC W/AUTO DIFF WBC: CPT

## 2022-07-01 PROCEDURE — 36589 REMOVAL TUNNELED CV CATH: CPT | Performed by: RADIOLOGY

## 2022-07-01 PROCEDURE — 99222 1ST HOSP IP/OBS MODERATE 55: CPT | Performed by: PSYCHIATRY & NEUROLOGY

## 2022-07-01 PROCEDURE — 99222 1ST HOSP IP/OBS MODERATE 55: CPT | Performed by: INTERNAL MEDICINE

## 2022-07-01 PROCEDURE — 2700000000 HC OXYGEN THERAPY PER DAY

## 2022-07-01 PROCEDURE — 99223 1ST HOSP IP/OBS HIGH 75: CPT | Performed by: RADIOLOGY

## 2022-07-01 PROCEDURE — 85652 RBC SED RATE AUTOMATED: CPT

## 2022-07-01 PROCEDURE — G0378 HOSPITAL OBSERVATION PER HR: HCPCS | Performed by: INTERNAL MEDICINE

## 2022-07-01 PROCEDURE — 87040 BLOOD CULTURE FOR BACTERIA: CPT

## 2022-07-01 PROCEDURE — 77001 FLUOROGUIDE FOR VEIN DEVICE: CPT

## 2022-07-01 PROCEDURE — 74230 X-RAY XM SWLNG FUNCJ C+: CPT

## 2022-07-01 RX ORDER — SODIUM CHLORIDE 9 MG/ML
INJECTION, SOLUTION INTRAVENOUS
Status: DISPENSED
Start: 2022-07-01 | End: 2022-07-01

## 2022-07-01 RX ORDER — HEPARIN SODIUM 1000 [USP'U]/ML
1000 INJECTION, SOLUTION INTRAVENOUS; SUBCUTANEOUS ONCE
Status: DISCONTINUED | OUTPATIENT
Start: 2022-07-01 | End: 2022-07-01

## 2022-07-01 RX ORDER — INSULIN ASPART 100 [IU]/ML
INJECTION, SOLUTION INTRAVENOUS; SUBCUTANEOUS
Qty: 30 ML | Refills: 3 | Status: SHIPPED | OUTPATIENT
Start: 2022-07-01 | End: 2022-08-17 | Stop reason: SDUPTHER

## 2022-07-01 RX ORDER — HEPARIN SODIUM 1000 [USP'U]/ML
2000 INJECTION, SOLUTION INTRAVENOUS; SUBCUTANEOUS ONCE
Status: DISCONTINUED | OUTPATIENT
Start: 2022-07-01 | End: 2022-07-09 | Stop reason: HOSPADM

## 2022-07-01 RX ADMIN — ACETAMINOPHEN 650 MG: 650 SUPPOSITORY RECTAL at 13:54

## 2022-07-01 RX ADMIN — INSULIN GLARGINE 35 UNITS: 100 INJECTION, SOLUTION SUBCUTANEOUS at 21:03

## 2022-07-01 RX ADMIN — SERTRALINE HYDROCHLORIDE 50 MG: 50 TABLET ORAL at 21:02

## 2022-07-01 RX ADMIN — MICONAZOLE NITRATE: 2 POWDER TOPICAL at 21:19

## 2022-07-01 RX ADMIN — VANCOMYCIN HYDROCHLORIDE 2000 MG: 1 INJECTION, POWDER, LYOPHILIZED, FOR SOLUTION INTRAVENOUS at 11:21

## 2022-07-01 RX ADMIN — ATORVASTATIN CALCIUM 40 MG: 40 TABLET, FILM COATED ORAL at 21:02

## 2022-07-01 RX ADMIN — HEPARIN SODIUM 5000 UNITS: 5000 INJECTION INTRAVENOUS; SUBCUTANEOUS at 06:15

## 2022-07-01 RX ADMIN — ACETAMINOPHEN 325MG 650 MG: 325 TABLET ORAL at 21:02

## 2022-07-01 RX ADMIN — INSULIN LISPRO 4 UNITS: 100 INJECTION, SOLUTION INTRAVENOUS; SUBCUTANEOUS at 21:03

## 2022-07-01 RX ADMIN — Medication 10 MG: at 21:02

## 2022-07-01 RX ADMIN — BARIUM SULFATE 50 ML: 0.81 POWDER, FOR SUSPENSION ORAL at 15:30

## 2022-07-01 RX ADMIN — ACETAMINOPHEN 650 MG: 650 SUPPOSITORY RECTAL at 03:35

## 2022-07-01 ASSESSMENT — ENCOUNTER SYMPTOMS
CHEST TIGHTNESS: 0
ABDOMINAL DISTENTION: 0
SHORTNESS OF BREATH: 0
CONSTIPATION: 0
COLOR CHANGE: 0
TROUBLE SWALLOWING: 0
DIARRHEA: 0
PHOTOPHOBIA: 0
RESPIRATORY NEGATIVE: 1
SHORTNESS OF BREATH: 1
COUGH: 0
BACK PAIN: 0
VOMITING: 0
WHEEZING: 0
ABDOMINAL PAIN: 0
EYES NEGATIVE: 1
GASTROINTESTINAL NEGATIVE: 1
NAUSEA: 0

## 2022-07-01 ASSESSMENT — PAIN SCALES - GENERAL
PAINLEVEL_OUTOF10: 7
PAINLEVEL_OUTOF10: 3
PAINLEVEL_OUTOF10: 0

## 2022-07-01 NOTE — CONSULTS
Infectious Disease     Patient Name: Ilia Retana  Date: 7/1/2022  YOB: 1960  Medical Record Number: [id number]        Sepsis with Enterococcus  Infected dialysis cath    History of Present Illness:  Anxiety congestive heart failure end-stage renal disease on hemodialysis COPD diabetes gastroesophageal reflux disease hypertension hepatitis C mitral valve regurgitation  Recurrent urinary tract infections bladder incontinence schizophrenia CVA  coronary artery bypass graft x1 vessel with tricuspid valve repair on 1/21/2022          Patient presented to the hospital from home after a fall having back pain emergency department, patient had fracture of 10th and 11th ribs  Negative for COVID      Blood cultures from admission 2 sets growing gram-positive cocci in chains  Identified as Enterococcus faecalis by PCR  Patient currently on vancomycin    Patient has dialysis catheter in left chest which has been used while her dialysis fistula in right upper extremity has been repaired she states they are now using the fistula in right arm            Review of Systems   Constitutional: Negative for chills, diaphoresis, fatigue and fever. HENT: Negative. Respiratory: Positive for shortness of breath. Negative for cough. Cardiovascular: Negative. Gastrointestinal: Negative. Endocrine: Negative. Genitourinary: Negative. Musculoskeletal: Negative.         Review of Systems: All 14 review of systems negative other than as stated above    Social History     Tobacco Use    Smoking status: Never Smoker    Smokeless tobacco: Never Used   Vaping Use    Vaping Use: Never used   Substance Use Topics    Alcohol use: No     Alcohol/week: 0.0 standard drinks    Drug use: No         Past Medical History:   Diagnosis Date    Angina at rest [facility]) 5/24/2020    Anxiety     CAD S/P percutaneous coronary angioplasty 2015, 2018    stents per dr Green Other    CH[facility]congestive heart failure) ([facility]  CKD (chronic kidney disease) stage 4, GFR 15-29 ml/min ([facility]) 2018    CKD stage 4 due to type 2 diabetes mellitus ([address]     Contusion of right chest wall 2021    COPD (chronic obstructive pulmonary disease) ([facility])     Diabetic nephropathy with proteinuria ([address] 2014    DJD (degenerative joint disease) of knee     Dr Patrick Fleming GERD (gastroesophageal reflux disease)     Hemiparesis, left ([address] 2013    entered Assisted Living ([facility])    Hemodialysis patient [facility])     Hemodialysis-associated hypotension 10/22/2021    History of heart failure     History of seizures     History of type C viral hepatitis     HTN (hypertension)     Hyperlipidemia     Impaired mobility and activities of daily living     Mediastinal lymphadenopathy     Jazmine Rosales    Metabolic syndrome     Moderate persistent asthma without complication     Need for extended care facility 2021    Neurogenic urinary incontinence 2013    Neuropathy in diabetes [facility])     Nonrheumatic mitral valve regurgitation 2021    Nonrheumatic tricuspid valve regurgitation 2021    Obesity (BMI 30-39. 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 Eleanor Alfaro x1 Dr. Tequila Gee, Dr Alyssa Orosco  08/10/2021    [facility]    DIAGNOSTIC CARDIAC CATH LAB PROCEDURE  10/02/2019    DIALYSIS CATHETER INSERTION Left 06/03/2022    Tunneled Symetrex 15.5F x 23cm hemodialysis catheter inserted by Dr. Violet Nguyen, TOTAL ABDOMINAL (CERVIX REMOVED)      one ovary intact, Dr Romero Clark, menorrhagia    IR THROMBECTOMY PERCUT AVF  6/6/2022    IR THROMBECTOMY PERCUT AVF 6/6/2022 [facility] PROCEDURE    IR TUNNELED CATHETER PLACEMENT GREATER THAN 5 YEARS  6/3/2022    IR TUNNELED CATHETER PLACEMENT GREATER THAN 5 YEARS 6/3/2022 [facility] PROCEDURE    WA TOTAL KNEE ARTHROPLASTY Left 06/21/2018    LEFT KNEE TOTAL KNEE ARTHROPLASTY, SHAYNA, NERVE BLOCK performed by Tashia Menendez MD at [address] [place] [address] [facility]CA      TOE AMPUTATION Right     TOTAL KNEE ARTHROPLASTY  05/19/2016    Dr Natalia Johnson TUNNELED [address] Right 07/01/2020    tunneled HD catheter per Dr Bradford Mckay         No current facility-administered medications on file prior to encounter. Current Outpatient Medications on File Prior to Encounter   Medication Sig Dispense Refill    ARIPiprazole (ABILIFY) 5 MG tablet TAKE 1 TABLET BY MOUTH ONCE DAILY 30 tablet 0    nitroGLYCERIN (NITROSTAT) 0.4 MG SL tablet up to max of 3 total doses.  If no relief after 1 dose, call 911. 25 tablet 3    furosemide (LASIX) 20 MG tablet Take 1 tablet by mouth 2 times daily (Patient taking differently: Take 100 mg by mouth 2 times daily ) 60 tablet 0    NYAMYC 684167 UNIT/GM powder APPLY TO AFFECTED AREA OF ABDOMINAL FOLDS EVERY 12 HOURS AS NEEDED 60 g 5    insulin aspart (NOVOLOG FLEXPEN) 100 UNIT/ML injection pen 15 UNITS PLUS SLIDING SCALE   LESS THAN 180 NONE  181-250 2 UNITS  251-300 4 UNITS  301-400 6 UNITS   401-500 10 UNITS 10 pen 3    isosorbide mononitrate (IMDUR) 30 MG extended release tablet Take 4 tablets by mouth daily 30 tablet 3    atorvastatin (LIPITOR) 40 MG tablet TAKE 1 TABLET BY MOUTH DAILY (Patient taking differently: Take 40 mg by mouth nightly ) 30 tablet 12    magnesium oxide (MAG-OX) 400 MG tablet TAKE 1 TABLET BY MOUTH DAILY 30 tablet 12    melatonin 10 MG CAPS capsule Take 1 capsule by mouth nightly 30 capsule 12    midodrine (PROAMATINE) 5 MG tablet Take 1 tablet by mouth one time a day every Tue, Thu, Sat for hypotension. Give 30 mins prior to dialysis. 90 tablet 3    Handicap Placard MISC by Does not apply route Expiration in 5 years. 1 each 0    sertraline (ZOLOFT) 50 MG tablet TAKE 1 TABLET BY MOUTH DAILY (Patient taking differently: Take 50 mg by mouth nightly ) 30 tablet 2    lidocaine-prilocaine (EMLA) 2.5-2.5 % cream Apply topically as needed for Pain Apply topically as needed.  3 times a week before dialysis wrap with saran wrap      albuterol (PROVENTIL) (2.5 MG/3ML) 0.083% nebulizer solution Take 3 mLs by nebulization every 4 hours as needed for Wheezing 120 each 3    hydrOXYzine (ATARAX) 25 MG tablet TAKE ONE TABLET BY MOUTH TWO TIMES A DAY      insulin glargine (LANTUS SOLOSTAR) 100 UNIT/ML injection pen 70 UNITS AT BEDTIME 30 pen 3    blood glucose test strips (ONETOUCH VERIO) strip  each 3    OneTouch Delica Lancets 29D MISC  each 3    Blood Glucose Monitoring Suppl (ONETOUCH VERIO) w/Device KIT AS DIRECTED 1 kit 0    Insulin Pen Needle (SURE COMFORT PEN NEEDLES) 30G X 8 MM MISC USE AS DIRECTED FIVE TIMES A DAILY 100 each 10    b complex-C-folic acid (NEPHROCAPS) 1 MG capsule Take 1 capsule by mouth daily      LANTUS SOLOSTAR 100 UNIT/ML injection pen 55 units at bedtime 10 pen 10    metoprolol tartrate (LOPRESSOR) 25 MG tablet TAKE 1/2 TABLET BY MOUTH TWO TIMES DAILY  (Patient taking differently: Take 12.5 mg by mouth 2 times daily ) 90 tablet 4    aspirin EC 81 MG EC tablet Take 1 tablet by mouth daily 30 tablet 12    blood glucose test strips (FREESTYLE LITE) strip 1 each by Does not apply route 4 times daily (before meals and nightly) As needed. 200 strip 5    Alcohol Swabs (EASY TOUCH ALCOHOL PREP MEDIUM) 70 % PADS USE AS DIRECTED THREE TIMES A  each 3    FreeStyle Lancets MISC Test 4x daily 150 each 3    acetaminophen (TYLENOL) 325 MG tablet Take 2 tablets by mouth every 4 hours as needed for Pain (For mild to moderate pain (Pain 1-6 out of 10 on pain scale)) 120 tablet 0    Blood Glucose Monitoring Suppl (FREESTYLE LITE) CORETTA 1 Device by Does not apply route daily as needed (Diabetes) Use freestyle meter to test blood sugar as needed 1 Device 0    nitroGLYCERIN (NITROSTAT) 0.4 MG SL tablet Place 1 tablet under the tongue every 5 minutes as needed for Chest pain         Allergies   Allergen Reactions    Codeine Hives     hives    Oxycontin [Oxycodone Hcl] Hives         Family History   Problem Relation Age of Onset    Cancer Mother 76        survived   Shruthi Coral Breast Cancer Mother     Hypertension Father     Diabetes Sister     Mental Illness Sister     Colon Cancer Neg Hx          Physical Exam:      Physical Exam  Constitutional:       General: She is not in acute distress. Appearance: She is obese. She is ill-appearing. HENT:      Head: Normocephalic and atraumatic. Mouth/Throat:      Pharynx: No oropharyngeal exudate or posterior oropharyngeal erythema. Neck:      Vascular: No carotid bruit. Cardiovascular:      Heart sounds: Murmur heard. Pulmonary:      Effort: Pulmonary effort is normal. No respiratory distress. Breath sounds: Normal breath sounds. No wheezing, rhonchi or rales. Abdominal:      General: Abdomen is flat. Bowel sounds are normal. There is no distension. Palpations: Abdomen is soft. There is no mass. Tenderness: There is no abdominal tenderness. There is no right CVA tenderness, left CVA tenderness, guarding or rebound. Hernia: No hernia is present. Musculoskeletal:         General: No swelling, tenderness or deformity.  Normal range of motion. Cervical back: Normal range of motion and neck supple. No rigidity or tenderness. Right lower leg: No edema. Left lower leg: No edema. Lymphadenopathy:      Cervical: No cervical adenopathy. Skin:     General: Skin is warm. Coloration: Skin is not jaundiced. Findings: No bruising, erythema or lesion. Neurological:      General: No focal deficit present. Blood pressure (!) 129/50, pulse 82, temperature 97.5 °F (36.4 °C), temperature source Oral, resp. rate 14, height 5' 7\" (1.702 m), weight 200 lb 9.6 oz (91 kg), SpO2 100 %, not currently breastfeeding.       .   Lab Results   Component Value Date    WBC 5.7 07/01/2022    HGB 9.7 (L) 07/01/2022    HCT 28.4 (L) 07/01/2022    MCV 93.9 07/01/2022    PLT 80 (L) 07/01/2022     Lab Results   Component Value Date/Time     07/01/2022 03:28 AM    K 4.8 07/01/2022 03:28 AM    CL 96 07/01/2022 03:28 AM    CO2 30 07/01/2022 03:28 AM    BUN 27 07/01/2022 03:28 AM    CREATININE 5.48 07/01/2022 03:28 AM    GLUCOSE 151 07/01/2022 03:28 AM    GLUCOSE 419 07/30/2021 08:16 AM    CALCIUM 9.6 07/01/2022 03:28 AM                ASSESSMENT:  Patient Active Problem List   Diagnosis    Atherosclerotic heart disease of native coronary artery with unspecified angina pectoris (HCC)    Schizophrenia, paranoid, chronic    Metabolic syndrome    Vitamin B 12 deficiency    Cerebral microvascular disease    Mixed hyperlipidemia    Other hammer toe (acquired)    Vitamin D insufficiency    Incontinence of urine    Diabetic nephropathy with proteinuria (HCC)    Essential (primary) hypertension    History of type C viral hepatitis    Urinary incontinence due to cognitive impairment    History of seizures    Stented coronary artery-plan is to stay on Plavix indefinately per Dr Rochelle Roger Other specified diabetes mellitus with diabetic neuropathy, unspecified ([facility]    Controlled type 2 diabetes mellitus with diabetic neuropathy, with long-term current use of insulin ([facility])    Hemiparesis, left ([facility])    Angina, class II ([facility])    Pain, unspecified    Tardive dyskinesia    Shortness of breath    Chronic diastolic congestive heart failure ([facility])    Sleep apnea, unspecified    Pulmonary hypertension, unspecified ([facility])    Class 2 severe obesity with serious comorbidity and body mass index (BMI) of 36.0 to 36.9 in adult [facility])    Edema    Closed supracondylar fracture of right humerus    Other chronic pain    Palliative care patient    Recurrent falls    Renal failure    Difficulty in walking    ESRD (end stage renal disease) on dialysis ([facility])    Weakness    Other seizures ([facility])    Moderate persistent asthma without complication    [id number]    Post PTCA    Falls frequently    Chest wall contusion, left, initial encounter    Headache, unspecified    [facility]anoid schizophrenia [facility]    Compression fracture of spine ([facility])    Closed rib fracture    Depression    Chronic obstructive pulmonary disease ([facility])    Critical illness polyneuropathy ([facility]    Multiple closed fractures of ribs of right side    Nonrheumatic mitral valve regurgitation    Nonrheumatic tricuspid valve regurgitation    Need for extended care facility    Chronic pain of both shoulders    Hemodialysis-associated hypotension    Anginal chest pain at rest [facility])    Chest pain    Unstable angina ([facility])    NSTEMI (non-ST elevated myocardial infarction) ([facility])    CKD (chronic kidney disease) stage 4, GFR 15-29 ml/min ([facility])    Hyperkalemia    Impaired mobility and activities of daily living    Dialysis patient [facility])    Unspecified open wound of right upper arm, initial encounter    Multiple closed fractures of right lower extremity and ribs    Closed T11 fracture ([facility])    Encephalopathy acute    MRSA bacteremia         PLAN:    Sepsis with Enterococcus  Infected dialysis cath  Continue vancomycin  Need to rule out endocarditis      Repeat blood cultures    Since patient states her right arm fistula is now functioning removing the dialysis catheter is indicated and part of the treatment of Enterococcus bacteremia

## 2022-07-01 NOTE — CONSULTS
CC:   Chief Complaint   Patient presents with    Back Pain     since fall last night from standing        HPI: Patient is a pleasant 20-year-old woman who presented to the hospital after having fallen at home and fracturing 2 ribs after she had gotten up to go to the bathroom and felt unstable and fell. Upon admission she was noted to have MRSA bacteremia with possible superimposed Enterococcus faecalis bacteremia. Patient has end-stage kidney disease and currently receiving dialysis through a right arm fistula. We recently declotted the right arm fistula and it is functioning well. Patient still has a tunneled left internal jugular vein dialysis catheter which she had when the fistula was not functioning. Interventional radiology was consulted for removal of the tunneled catheter to treat the bacteremia.     Family History   Problem Relation Age of Onset   Zuñiga Hedger Cancer Mother 76        survived   Zuñiga Hedger Breast Cancer Mother     Hypertension Father     Diabetes Sister     Mental Illness Sister     Colon Cancer Neg Hx        Past Surgical History:   Procedure Laterality Date     SECTION      x1    COLONOSCOPY  2014    Dr. Vero Otto      x1 Dr. Alec Wiseman, Dr Emma Raines 2018    Electa Braver  [facility]/10/2021    [facility]    DIAGNOSTIC CARDIAC CATH LAB PROCEDURE  10/02/2019    DIALYSIS CATHETER INSERTION Left 2022    Tunneled Symetrex 15.5F x 23cm hemodialysis catheter inserted by Dr. Clint Duong, TOTAL ABDOMINAL (CERVIX REMOVED)      one ovary intact, Dr Bryan Gómez, menorrhagia    IR THROMBECTOMY PERCUT AVF  2022    IR THROMBECTOMY PERCUT AVF 2022 [facility]ED[facility]    IR TUNNELED CATHETER PLACEMENT GREATER THAN 5 YEARS  6/3/2022    IR TUNNELED CATHETER PLACEMENT GREATER THAN 5 YEARS 6/3/2022 [facility] PROCED[facility]    CO TOTAL KNEE ARTHROPLASTY Left 2018    LEFT KNEE TOTAL KNEE ARTHROPLASTY, SHAYNA, NERVE BLOCK performed by Jolene Salazar MD at [address] [place] [address] [facility]      TOE AMPUTATION Right     TOTAL KNEE ARTHROPLASTY  05/19/2016    Dr Carlo Downey TUNNELED [address] Right 07/01/2020    tunneled HD catheter per Dr Cain Hess        Past Medical History:   Diagnosis Date    Angina at rest [facility]) 5/24/2020    Anxiety     CAD S/P percutaneous coronary angioplasty 2015, 2018    stents per dr Isi Torres    CHF (congestive heart failure) ([address]     CKD (chronic kidney disease) stage 4, GFR 15-29 ml/min ([address] 2/24/2018    CKD stage 4 due to type 2 diabetes mellitus ([address]     Contusion of right chest wall 2/16/2021    COPD (chronic obstructive pulmonary disease) ([address]     Diabetic nephropathy with proteinuria ([address] 2014    DJD (degenerative joint disease) of knee     Dr Carlo Downey GERD (gastroesophageal reflux disease)     Hemiparesis, left ([address] 2013    [facility] Assisted Living ([facility]    Hemodialysis patient [facility])     Hemodialysis-associated hypotension 10/22/2021    History of heart failure     History of seizures     History of type C viral hepatitis     HTN (hypertension)     Hyperlipidemia     Impaired mobility and activities of daily living     Mediastinal lymphadenopathy 2013    Dr Hubert Deleon [facility]    Metabolic syndrome     Moderate persistent asthma without complication 0/32/6268    Need for extended care facility 7/7/2021    Neurogenic urinary incontinence 2013    Neuropathy in diabetes [facility])     Nonrheumatic mitral valve regurgitation 7/7/2021    Nonrheumatic tricuspid valve regurgitation 7/7/2021    Obesity (BMI 30-39. 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 [facility]     Type 2 diabetes mellitus with renal manifestations, controlled ([facility] 2015    Insulin dependent, Dr Leonor Mon    Uncontrolled type 2 diabetes mellitus with hyperglycemia ([facility]     Urinary incontinence due to cognitive impairment 2013    Vitamin D deficiency 2014       Social History     Socioeconomic History    Marital status:      Spouse name: None    Number of children: 2    Years of education: None    Highest education level: None   Occupational History    Occupation: disabled   Tobacco Use    Smoking status: Never Smoker    Smokeless tobacco: Never Used   Vaping Use    Vaping Use: Never used   Substance and Sexual Activity    Alcohol use: No     Alcohol/week: 0.0 standard drinks    Drug use: No    Sexual activity: Not Currently   Other Topics Concern    None   Social History Narrative    Disabled, lives in [facility] [place] close by     [place] in [place], one of 5    Twin sister Reyes, very ill in 2018, [place] 2019    Moved to [facility], , 2 children, one son and one daughter    Worked at [employer], as a nurse's aide    Disabled due to mental illness    Lived at [facility], was discharged, returned to independent living in 2017 in the daughter's house and has adjusted well    One son and one daughter, live in the same house with patient, Garry Gtz pays the rent    Lono reading ([email or web address])             10/11/2021 [facility] updates; patient lives with her daughter son-in-law and 2 grandchildren and patient's handicapped son. Per daughter, her brother is blind, MRDD, multiple health issues. Daughter's  is patient's legal guardian. Patient has hemodialysis Tuesday Thursday and Saturday. Patient's bedroom is on main floor with a half bath. Daughter walks patient upstairs once weekly for full bath. Patient is using her walker in the home. Patient has a hospital bed in the home.      Social Determinants of Health     Financial Resource Strain: Low Risk     Difficulty of Paying Living Expenses: Not hard at all   Food Insecurity: No Food Insecurity    Worried About Running Out of Food in the Last Year: Never true    Yung of Food in the Last Year: Never true   Transportation Needs: No Transportation Needs    Lack of Transportation (Medical): No    Lack of Transportation (Non-Medical): No   Physical Activity: Sufficiently Active    Days of Exercise per Week: 3 days    Minutes of Exercise per Session: 60 min   Stress:     Feeling of Stress : Not on file   Social Connections:     Frequency of Communication with Friends and Family: Not on file    Frequency of Social Gatherings with Friends and Family: Not on file    Attends Latter day Services: Not on file    Active Member of Clubs or Organizations: Not on file    Attends Club or Organization Meetings: Not on file    Marital Status: Not on file   Intimate Partner Violence:     Fear of Current or Ex-Partner: Not on file    Emotionally Abused: Not on file    Physically Abused: Not on file    Sexually Abused: Not on file   Housing Stability:     Unable to Pay for Housing in the Last Year: Not on file    Number of [place] in the Last Year: Not on file    Unstable Housing in the Last Year: Not on file       Allergies   Allergen Reactions    Codeine Hives     hives    Oxycontin [Oxycodone Hcl] Hives       No current facility-administered medications on file prior to encounter. Current Outpatient Medications on File Prior to Encounter   Medication Sig Dispense Refill    ARIPiprazole (ABILIFY) 5 MG tablet TAKE 1 TABLET BY MOUTH ONCE DAILY 30 tablet 0    nitroGLYCERIN (NITROSTAT) 0.4 MG SL tablet up to max of 3 total doses.  If no relief after 1 dose, call 911. 25 tablet 3    furosemide (LASIX) 20 MG tablet Take 1 tablet by mouth 2 times daily (Patient taking differently: Take 100 mg by mouth 2 times daily ) 60 tablet 0    NYAMYC daily      LANTUS SOLOSTAR 100 UNIT/ML injection pen 55 units at bedtime 10 pen 10    metoprolol tartrate (LOPRESSOR) 25 MG tablet TAKE 1/2 TABLET BY MOUTH TWO TIMES DAILY  (Patient taking differently: Take 12.5 mg by mouth 2 times daily ) 90 tablet 4    aspirin EC 81 MG EC tablet Take 1 tablet by mouth daily 30 tablet 12    blood glucose test strips (FREESTYLE LITE) strip 1 each by Does not apply route 4 times daily (before meals and nightly) As needed. 200 strip 5    Alcohol Swabs (EASY TOUCH ALCOHOL PREP MEDIUM) 70 % PADS USE AS DIRECTED THREE TIMES A  each 3    FreeStyle Lancets MISC Test 4x daily 150 each 3    acetaminophen (TYLENOL) 325 MG tablet Take 2 tablets by mouth every 4 hours as needed for Pain (For mild to moderate pain (Pain 1-6 out of 10 on pain scale)) 120 tablet 0    Blood Glucose Monitoring Suppl (FREESTYLE LITE) CORETTA 1 Device by Does not apply route daily as needed (Diabetes) Use freestyle meter to test blood sugar as needed 1 Device 0    nitroGLYCERIN (NITROSTAT) 0.4 MG SL tablet Place 1 tablet under the tongue every 5 minutes as needed for Chest pain         Review of Systems   Constitutional: Positive for fatigue. Negative for chills, diaphoresis and fever. HENT: Negative. Negative for congestion, ear pain, hearing loss and tinnitus. Eyes: Negative. Negative for photophobia. Respiratory: Negative. Negative for cough, shortness of breath and wheezing. Cardiovascular: Negative for chest pain, palpitations and leg swelling. Gastrointestinal: Negative. Negative for abdominal pain, constipation, diarrhea, nausea and vomiting. Genitourinary: Negative. Negative for dysuria, flank pain, frequency, hematuria and urgency. Musculoskeletal: Positive for gait problem. Negative for back pain and neck pain. Skin: Negative. Negative for rash. Allergic/Immunologic: Negative for environmental allergies.    Neurological: Negative for dizziness, tremors, weakness and headaches. Hematological: Does not bruise/bleed easily. Psychiatric/Behavioral: Negative. Negative for hallucinations and suicidal ideas. The patient is not nervous/anxious. OBJECTIVE:  BP (!) 129/50   Pulse 82   Temp 97.5 °F (36.4 °C) (Oral)   Resp 14   Ht 5' 7\" (1.702 m)   Wt 200 lb 9.6 oz (91 kg)   LMP  (LMP Unknown)   SpO2 100%   BMI 31.42 kg/m²     Physical Exam  Constitutional:       General: She is not in acute distress. Appearance: She is well-developed. She is not diaphoretic. HENT:      Head: Normocephalic and atraumatic. Nose: Nose normal.      Mouth/Throat:      Pharynx: No oropharyngeal exudate. Eyes:      General: No scleral icterus. Right eye: No discharge. Left eye: No discharge. Conjunctiva/sclera: Conjunctivae normal.   Neck:      Thyroid: No thyromegaly. Vascular: No JVD. Trachea: No tracheal deviation. Cardiovascular:      Rate and Rhythm: Normal rate and regular rhythm. Heart sounds: Normal heart sounds. No murmur heard. No friction rub. No gallop. Pulmonary:      Effort: No respiratory distress. Breath sounds: No stridor. No wheezing or rales. Chest:      Chest wall: No tenderness. Comments: Left internal jugular vein tunneled dialysis catheter site is unremarkable without swelling, erythema, tenderness, or discharge. Abdominal:      General: Bowel sounds are normal. There is no distension. Palpations: Abdomen is soft. There is no mass. Tenderness: There is no abdominal tenderness. There is no guarding or rebound. Hernia: No hernia is present. Musculoskeletal:         General: No tenderness or deformity. Arms:       Cervical back: Neck supple. Comments: Good thrill in right arm fistula   Skin:     General: Skin is dry. Coloration: Skin is not pale. Findings: No erythema or rash. Neurological:      Mental Status: She is alert and oriented to person, place, and time. Cranial Nerves: No cranial nerve deficit. Psychiatric:         Behavior: Behavior normal.         Thought Content: Thought content normal.         Judgment: Judgment normal.         Lab Results   Component Value Date/Time    WBC 5.7 07/01/2022 03:28 AM    HGB 9.7 07/01/2022 03:28 AM    HCT 28.4 07/01/2022 03:28 AM    PLT 80 07/01/2022 03:28 AM    MCV 93.9 07/01/2022 03:28 AM       CT ABDOMEN PELVIS WO CONTRAST Additional Contrast? None    Result Date: 6/30/2022  CT ABDOMEN PELVIS WO CONTRAST: 6/30/2022 CLINICAL HISTORY:  severe low back and right flank pain after fall; assess for retroperitoneal hematoma . COMPARISON: 4/20/2022. TECHNIQUE: Spiral images were obtained of the abdomen and pelvis without contrast. All CT scans at this facility use dose modulation, iterative reconstruction, and/or weight based dosing when appropriate to reduce radiation dose to as low as reasonably achievable. FINDINGS: Minimally displaced acute fractures of the posterior right 10th and 11th ribs are noted. A small right pleural effusion has mildly decreased in size from the prior study. There is no organized hematoma, other displaced fractures, evidence of solid organ injury (within the limits of a noncontrast study), or other significant changes from 4/20/2022 identified. A small to moderate-sized left pleural effusion, mild probable scarring and/or atelectasis of the visualized lung bases, a moderate compression fracture of T11, and other previously described chronic findings appear unchanged. MINIMALLY DISPLACED ACUTE FRACTURES OF THE POSTERIOR RIGHT 10TH AND 11TH RIBS. NO OTHER SIGNIFICANT RECENT POSTTRAUMATIC COMPLICATION IDENTIFIED. CHRONIC FINDINGS, NOT SIGNIFICANTLY CHANGED FROM 4/20/2022. XR CHEST (2 VW)    Result Date: 6/30/2022  EXAMINATION:  CHEST. CLINICAL HISTORY:  CONFUSION. COMPARISONS:  6/2/2022. TECHNIQUE:  AP and lateral. FINDINGS: There is mild to moderate cardiomegaly.  No definite evidence of pulmonary venous congestion. Chronic changes are noted in the left lower thorax. There is a dialysis catheter in place. There is a valve prosthesis in place. There are small pleural effusions or blunting of both costophrenic angles. NO SIGNIFICANT CHANGE SINCE THE PREVIOUS EXAM. THERE HAS BEEN PLACEMENT OF A DIALYSIS CATHETER SINCE THAT EXAM.     CT HEAD WO CONTRAST    Result Date: 6/30/2022  CT HEAD WO CONTRAST : 6/30/2022 CLINICAL HISTORY:  fall from standing last night confused at dialysis c/o back pain . COMPARISON: 10/11/2021. TECHNIQUE: Spiral unenhanced images were obtained of the head, with routine multiplanar reconstructions performed. All CT scans at this facility use dose modulation, iterative reconstruction, and/or weight based dosing when appropriate to reduce radiation dose to as low as reasonably achievable. FINDINGS: There is no intracranial hemorrhage, mass effect, midline shift, extra-axial collection, evidence of hydrocephalus, recent ischemic infarct, or skull fracture identified. Chronic volume loss and mild mucosal thickening is again noted within the maxillary sinuses. The mastoid air cells and other visualized paranasal sinuses are essentially clear. NO ACUTE INTRACRANIAL PROCESS OR SIGNIFICANT CHANGE FROM 10/11/2021 IDENTIFIED. CT CHEST WO CONTRAST    Result Date: 6/30/2022  CT CHEST WO CONTRAST: 6/30/2022 CLINICAL HISTORY:  fall; rib fractures . COMPARISON: CT abdomen pelvis from earlier 6/30/2022, and chest CTA 1/12/2022. TECHNIQUE: Spiral imaging was obtained of the chest without contrast. All CT scans at this facility use dose modulation, iterative reconstruction, and/or weight based dosing when appropriate to reduce radiation dose to as low as reasonably achievable. FINDINGS: Minimally displaced acute fractures of the posterior right 10th and 11th ribs appear similar to the recent abdomen CT.  Mild right lateral rib deformities of the mid levels appears substantially similar to 1/12/2022, given the motion artifact on the previous exam. There is no organized hematoma, significant pulmonary contusion, or other acute posttraumatic complication identified. Since the prior study, a left internal jugular approach hemodialysis catheter has been placed with its tip within the SVC, and postoperative changes from previous mitral valve replacement are otherwise unremarkable. The heart remains mildly enlarged with  a very small pericardial effusion. A small to moderate-sized left pleural effusion, small right pleural effusion, and mild probable atelectasis or scarring of the mid to lower lung fields is noted. MINIMALLY DISPLACED ACUTE RIGHT 10TH AND 11TH RIB FRACTURES. NO OTHER ACUTE FRACTURE OR SIGNIFICANT POSTTRAUMATIC COMPLICATION IDENTIFIED. CT CERVICAL SPINE WO CONTRAST    Result Date: 6/30/2022  EXAMINATION:  CT CERVICAL SPINE. CLINICAL HISTORY:  TRAUMA. COMPARISONS:  10/11/2021. TECHNIQUE:  Serial 2.5 mm scans. No contrast. FINDINGS:  There is some reversal of the normal lordotic curve. There is slight scoliosis. There is significant degenerative arthritis with narrowing of all the interspaces. No fracture is seen. No prevertebral soft tissue swelling. SIGNIFICANT DEGENERATIVE ARTHRITIS. NO FRACTURE IS SEEN. CT THORACIC SPINE WO CONTRAST    Result Date: 6/30/2022  EXAMINATION:  CT THORACIC SPINE. CLINICAL HISTORY:  TRAUMA. COMPARISONS:  NONE AVAILABLE TECHNIQUE:   Serial 2.5 mm scans. No contrast.  FINDINGS:  There is mild thoracic kyphoscoliosis. There is a compression fracture of T11 with some anterior wedging. Radiographic lead this appears to be old rather than acute. No other fracture is seen. There is mild to moderate degenerative arthritis. We note a small pleural effusion on the right and a moderate sized effusion on the left. OLD COMPRESSION FRACTURE OF T11. AN ACUTE FRACTURE IS NOT SEEN. BILATERAL PLEURAL EFFUSIONS.      CT LUMBAR SPINE WO CONTRAST    Result Date: 6/30/2022  EXAMINATION:  CT LUMBAR SPINE. CLINICAL HISTORY:  TRAUMA. COMPARISONS:  NONE AVAILABLE TECHNIQUE:  Serial 2.5 mm scans. No contrast. FINDINGS:  Vertebral bodies are in fairly normal alignment. Interspaces are fairly well maintained. There is mild degenerative arthritis. No fracture is seen. MILD DEGENERATIVE ARTHRITIS. NO FRACTURE. XR CHEST PORTABLE    Result Date: 6/30/2022  COMPARISON: No to 30 of 2022. HISTORY: SOB TECHNIQUE: AP view FINDINGS: No consolidating pneumonia or pneumothorax is seen. The costophrenic angles again appear blunted. The cardiac silhouette is enlarged. There is again evidence of median sternotomy and valve replacement. A central venous catheter is again seen in place and  unchanged in position. . There are appear to be healed rib fractures in the left hemithorax. Degenerative changes are seen in the spine and visualized right shoulder. Findings may represent mild CHF. It is not significantly change from previous study. ASSESSMENT ANDPLAN:    Assessment: Patient with end-stage kidney disease, now receiving dialysis through a right arm fistula which we recently declotted. Patient still has left tunneled internal internal jugular vein dialysis catheter. Patient now diagnosed with MRSA bacteremia and possible superimposed Enterococcus faecalis bacteremia. Plan: Removal of the permanent tunneled left internal jugular vein dialysis catheter to treat bacteremia.     Salomon Verdugo MD, Bassam Longo

## 2022-07-01 NOTE — OR NURSING
NO SEDATION    1550- Pt to specials room 6 via cart. Procedure explained. Consent verified, obtained by IR staff. History, allergies, medications reviewed. 1555- Left chest permcath site dressing removed site assessed, cleansed generously with chloroprep and draped in sterile fashion. 0- Dr. Suzanne Morales here speaking with pt. Time out performed. 1601-Left chest site numbed with 2% lidocaine around catheter site. With assist of  hemostats, Dr. Suzanne Morales removed catheter 23 cm. Catheter is intact. Tip to be sent for culture, verbal order from Dr. Suzanne Morales received. Specimen taken to lab.     1604- Moderate manual pressure held above clavicle site. Pt tolerating well. 1610- Hemostasis achieved at site. Steri strips applied, with a guaze and Tegaderm dressing. 1623- Report given to patient's nurse [facility]. Patient awaiting final fluoro image to be done prior to being transported back to her room. 1630-No bleeding noted. Final fluoro image taken. Pt assisted off table. Pt awaiting transport back to her room.

## 2022-07-01 NOTE — BRIEF OP NOTE
Preliminary  Procedure Note, Full Note To Follow in PACS  Vascular and Interventional Radiology      Dylan Potter  1960  [id number]    Date of Procedure: 07/01/22    Physician: Gurinder Marie MD, DABR    Pre-Op Diagnosis: MRSA bacteremia    Post-Op Diagnosis: Same       Procedure: Removal of tunneled left IJ HD CVC    Estimated Blood Loss (mL): Minimal    Complications: None    Specimens: Catheter tip sent for culture and gram stain    Implants: None    Drains: None    Findings:  Tunneled left IJ HD CVC was removed    Electronically signed by Jin Mcclellan MD on 7/1/2022 at 4:07 PM

## 2022-07-01 NOTE — CONSULTS
Physical Medicine & Rehabilitation  Consult Note      Admitting Physician: Nancie Spangler DO    Primary Care Provider: Garcia Kahn MD     Reason for Consult:  Asses rehab needs, promote physical and mental function, analyze level of care to determine rehab needs, improve ability to actively participate in the rehabilitation process, and decrease likelihood of re-admit to the hospital after discharge. History of Present Illness:    Jenna Medina is a 57 y.o. female admitted to [facility] on 6/30/2022. Admitted from the ED after falling. Phyllis Crape Her right 10th and 11th rib. She is currently on dialysis and she was diagnosed with MRSA bacteremia. He unfortunately had a rapid response called after she ate a meal on 6/30/2022. She is n.p.o. pending a modified barium swallow. Fatigue  This is a recurrent problem. The current episode started in the past 7 days. The problem occurs constantly. The problem has been gradually improving. Associated symptoms include fatigue. The symptoms are aggravated by walking. She has tried rest for the symptoms. The treatment provided mild relief. I reviewed recent nursing notes discussed care with acute care providers, \" Renal consult dictated  Dialysis today  Pain management lumbar d/t fall  MRSA  Remove dialysis catheter  Order for Dr Elvin Saravia placed Start vancomycin on dialysis  \". Events from the previous 24 hours reviewed    . Their inpatient work up has included:    Imaging:  Imaging and other studies reviewed and discussed with patient and staff    CT ABDOMEN PELVIS  6/30/2022   Minimally displaced acute fractures of the posterior right 10th and 11th ribs are noted. A small right pleural effusion has mildly decreased in size from the prior study.  There is no organized hematoma, other displaced fractures, evidence of solid organ injury (within the limits of a noncontrast study), or other significant changes from 4/20/2022 identified. A small to moderate-sized left pleural effusion, mild probable scarring and/or atelectasis of the visualized lung bases, a moderate compression fracture of T11, and other previously described chronic findings appear unchanged. MINIMALLY DISPLACED ACUTE FRACTURES OF THE POSTERIOR RIGHT 10TH AND 11TH RIBS. NO OTHER SIGNIFICANT RECENT POSTTRAUMATIC COMPLICATION IDENTIFIED. CHRONIC FINDINGS, NOT SIGNIFICANTLY CHANGED FROM 4/20/2022. XR CHEST   6/30/2022 There is mild to moderate cardiomegaly. No definite evidence of pulmonary venous congestion. Chronic changes are noted in the left lower thorax. There is a dialysis catheter in place. There is a valve prosthesis in place. There are small pleural effusions or blunting of both costophrenic angles. NO SIGNIFICANT CHANGE SINCE THE PREVIOUS EXAM. THERE HAS BEEN PLACEMENT OF A DIALYSIS CATHETER SINCE THAT EXAM.     XR RIBS RIGHT 6/22/2022 There are no lytic or sclerotic bone lesions. There are mild deformities of the lateral fifth, sixth, and seventh ribs which may represent subacute to chronic fractures. Status post median sternotomy. There is a left internal jugular central venous catheter in place. The cardiomediastinal silhouette is enlarged. The visualized portions of the lung and chest wall are within normal limits. There are no radiopaque foreign bodies. There are no acute changes. CT HEAD   6/30/2022  There is no intracranial hemorrhage, mass effect, midline shift, extra-axial collection, evidence of hydrocephalus, recent ischemic infarct, or skull fracture identified. Chronic volume loss and mild mucosal thickening is again noted within the maxillary sinuses. The mastoid air cells and other visualized paranasal sinuses are essentially clear. NO ACUTE INTRACRANIAL PROCESS OR SIGNIFICANT CHANGE FROM 10/11/2021 IDENTIFIED. CT CHEST   6/30/2022  CT CHEST WO CONTRAST: 6/30/2022 CLINICAL HISTORY:  fall; rib fractures . AVAILABLE TECHNIQUE:   Serial 2.5 mm scans. No contrast.  FINDINGS:  There is mild thoracic kyphoscoliosis. There is a compression fracture of T11 with some anterior wedging. Radiographic lead this appears to be old rather than acute. No other fracture is seen. There is mild to moderate degenerative arthritis. We note a small pleural effusion on the right and a moderate sized effusion on the left. OLD COMPRESSION FRACTURE OF T11. AN ACUTE FRACTURE IS NOT SEEN. BILATERAL PLEURAL EFFUSIONS. CT LUMBAR SPINE WO CONTRAST    Result Date: 6/30/2022  EXAMINATION:  CT LUMBAR SPINE. CLINICAL HISTORY:  TRAUMA. COMPARISONS:  NONE AVAILABLE TECHNIQUE:  Serial 2.5 mm scans. No contrast. FINDINGS:  Vertebral bodies are in fairly normal alignment. Interspaces are fairly well maintained. There is mild degenerative arthritis. No fracture is seen. MILD DEGENERATIVE ARTHRITIS. NO FRACTURE. IR FLUORO GUIDED CVA DEVICE PLMT/REPLACE/REMOVAL    1. Successful placement of a central venous catheter with subcutaneous tunnel in the internal jugular vein for dialysis. Radiation dose to the patient was: 15.11 mGy Additional clinical data: Thrombosed right arm graft Procedure: 1. Ultrasound guidance for vascular access. The ultrasound image of the blood vessel was saved to PACS. 2.   Fluoroscopic guidance for placement of a central line. 3.   Placement of a central venous line with subcutaneous tunnel using the left internal jugular vein. Body of Report: Informed and written consent was obtained from the patient following discussion of risks, benefits and alternatives to this procedure. The was patient placed supine on the angiographic table. The patient's neck and chest were then prepped and draped in  normal sterile fashion. A small amount of local lidocaine anesthesia was injected subcutaneously. Ultrasound was used to study the jugular vein we intended to use prior to accessing it. The vein was patent.   The ultrasound image of the blood vessel was saved to PACS. Using ultrasound access, puncture was made of the left internal jugular vein using a 21 GA needle. A wire was advanced into the IVC, a short incision was made at the puncture site and serial dilatation performed. A 16 Ethiopian peel-away sheath was then advanced into the superior vena cava/right atrial junction. Next an incision was made at the site of the subcutaneous tunnel, approximately 4 cm caudal to the venous access site. A subcutaneous tunnel was created from the tunnel site to the venous access site in a retrograde fashion along with the 15.5 Frisian dialysis catheter. The data Dacron cuff was inserted through the tunnel and placed subcutaneously. The catheter was then advanced through the peel-away sheath, and sheath removed. The tip of the catheter lies at the SVC/right atrial junction. The line flushes and aspirates appropriately. The catheter lines were both flushed with 10 cc of normal saline, and then blocked with 100 units/cc heparin. The catheter was sutured to the skin with nylon suture, and sterile bandaging was placed. XR CHEST PORTABLE    Result Date: 6/30/2022  COMPARISON: No to 30 of 2022. HISTORY: SOB TECHNIQUE: AP view FINDINGS: No consolidating pneumonia or pneumothorax is seen. The costophrenic angles again appear blunted. The cardiac silhouette is enlarged. There is again evidence of median sternotomy and valve replacement. A central venous catheter is again seen in place and  unchanged in position. . There are appear to be healed rib fractures in the left hemithorax. Degenerative changes are seen in the spine and visualized right shoulder. Findings may represent mild CHF. It is not significantly change from previous study.      XR CHEST PORTABLE    Result Date: 6/2/2022  TECHNIQUE: Single portable view of the chest. CLINICAL INDICATION: 75-year-old female with malfunctioning vascular access COMPARISON: Chest x-ray obtained on 4/20/2022 PROCEDURE AND FINDINGS: The cardiomediastinal silhouette is prominent Prominence of the bronchovascular and interstitial lung markings is visualized bilaterally. Peribronchial cuffing is seen, opacification visualized overlying the left lower lung field with obscuration of the left costophrenic angle consistent with left pleural effusion. No evidence of pneumothorax or parenchymal lung mass. The bony thorax unremarkable for the patient's age. Congestive changes. IR PTA VENOUS PERC    1. Successful fistulogram of right arm dialysis AV graft. 2.  Successful thrombectomy of completely thrombosed graft 3. Successful balloon angioplasty of stenosis near the arterial anastomosis and venous anastomosis of the graft CLINICAL DATA: Entirely thrombosed graft RADIATION DOSE: 71.12 mGy. PROCEDURES PERFORMED: 1. Ultrasound guidance for vascular access. Ultrasound images saved to PACS. 2. Fluoroscopy-guided fistulogram. 3. IV conscious sedation, sedation time 100 minutes. 4. Thrombectomy of the completely thrombosed AV graft 5. Balloon angioplasty of stenosis at the arterial anastomosis and venous anastomosis PROCEDURE: Following the discussion of the procedure, alternatives, risks versus benefits, informed consent was obtained from the patient. Specifically, risks of after-biopsy pain at the site, rare possibility of excessive hemorrhage, infection, injury to the adjacent organs were discussed and the patient verbalized understanding. Pre-procedure evaluation confirmed that the patient was an appropriate candidate for conscious sedation. Adequate sedation was maintained during the entire procedure. Vital signs, pulse oximetry, and response to verbal commands were monitored and recorded by the nurse throughout the procedure and the recovery period. The flow sheet was placed in the medical record including the medications and dosages used.  The patient returned to baseline neurologic and physiologic status prior to leaving the department. No immediate sedation related complications were noted. Medication for conscious sedation was administered via IV route. 100 minutes of conscious sedation was provided. Following universal protocol, patient and site verification was performed with a \"timeout\" prior to the procedure. Informed and written consent was obtained from the patient following discussion of risks, benefits and alternatives to this procedure. The was patient placed supine on the angiographic table. The patient's right upper extremity brachial artery to brachial vein graft site was then prepped and draped in normal sterile fashion. A small amount of local lidocaine anesthesia was injected subcutaneously. Ultrasound was used to study the AV graft prior to accessing it. The vein was patent to the level of the proximal arm. Using ultrasound access, puncture was made of the graft in both the antegrade and separate retrograde fashion using a 21 GA needle's, ultrasound images saved to PACS for both access. A wire was advanced and a 6 [place] Sid[place] short sheaths were placed. Through the sheath contrast was hand injected, and serial digital subtraction angiography of the graft was performed. The graft was completely thrombosed without any blood flow. Additionally there was severe stenosis at the arterial anastomosis and venous anastomosis site. AngioJet catheter was advanced into the graft and the arterial anastomosis and AngioJet thrombectomy was performed. Repeat fistulogram demonstrated a thrombus with a severe stenosis at the arterial anastomosis and graft. Following that a 6 mm balloon was advanced to the arterial anastomosis and angioplasty was performed. Repeat fistulogram demonstrated now widely patent arterial anastomosis and arterial portion of the graft. Following that AngioJet catheter was advanced through the access into the venous anastomosis site and AngioJet thrombectomy was performed.  Repeat fistulogram demonstrated a thrombus from the venous side, though there was severe venous anastomosis stenosis. Following that a 6 mm balloon was advanced and angioplasty was performed. Repeat fistulogram demonstrated improvement, with residual stenosis at the venous anastomosis site. Following that a 7 mm balloon was advanced and repeat angioplasty was performed. Repeat fistulogram demonstrated a now widely patent graft anteriorly with grade blood flow. The access sites were then sutured with a pursestring suture and sterile dressings were placed. There were no immediate complications. The patient was discharged in normal and stable condition. IR DIALYSIS FISTULAGRAM EVAL    1. Successful fistulogram of right arm dialysis AV graft. 2.  Successful thrombectomy of completely thrombosed graft 3. Successful balloon angioplasty of stenosis near the arterial anastomosis and venous anastomosis of the graft CLINICAL DATA: Entirely thrombosed graft RADIATION DOSE: 71.12 mGy. PROCEDURES PERFORMED: 1. Ultrasound guidance for vascular access. Ultrasound images saved to PACS. 2. Fluoroscopy-guided fistulogram. 3. IV conscious sedation, sedation time 100 minutes. 4. Thrombectomy of the completely thrombosed AV graft 5. Balloon angioplasty of stenosis at the arterial anastomosis and venous anastomosis PROCEDURE: Following the discussion of the procedure, alternatives, risks versus benefits, informed consent was obtained from the patient. Specifically, risks of after-biopsy pain at the site, rare possibility of excessive hemorrhage, infection, injury to the adjacent organs were discussed and the patient verbalized understanding. Pre-procedure evaluation confirmed that the patient was an appropriate candidate for conscious sedation. Adequate sedation was maintained during the entire procedure.   Vital signs, pulse oximetry, and response to verbal commands were monitored and recorded by the nurse throughout the procedure and the recovery period. The flow sheet was placed in the medical record including the medications and dosages used. The patient returned to baseline neurologic and physiologic status prior to leaving the department. No immediate sedation related complications were noted. Medication for conscious sedation was administered via IV route. 100 minutes of conscious sedation was provided. Following universal protocol, patient and site verification was performed with a \"timeout\" prior to the procedure. Informed and written consent was obtained from the patient following discussion of risks, benefits and alternatives to this procedure. The was patient placed supine on the angiographic table. The patient's right upper extremity brachial artery to brachial vein graft site was then prepped and draped in normal sterile fashion. A small amount of local lidocaine anesthesia was injected subcutaneously. Ultrasound was used to study the AV graft prior to accessing it. The vein was patent to the level of the proximal arm. Using ultrasound access, puncture was made of the graft in both the antegrade and separate retrograde fashion using a 21 GA needle's, ultrasound images saved to PACS for both access. A wire was advanced and a 6 Western Sidra short sheaths were placed. Through the sheath contrast was hand injected, and serial digital subtraction angiography of the graft was performed. The graft was completely thrombosed without any blood flow. Additionally there was severe stenosis at the arterial anastomosis and venous anastomosis site. AngioJet catheter was advanced into the graft and the arterial anastomosis and AngioJet thrombectomy was performed. Repeat fistulogram demonstrated a thrombus with a severe stenosis at the arterial anastomosis and graft. Following that a 6 mm balloon was advanced to the arterial anastomosis and angioplasty was performed.  Repeat fistulogram demonstrated now widely patent arterial anastomosis and arterial portion of made of the left internal jugular vein using a 21 GA needle. A wire was advanced into the IVC, a short incision was made at the puncture site and serial dilatation performed. A 16 Upper sorbian peel-away sheath was then advanced into the superior vena cava/right atrial junction. Next an incision was made at the site of the subcutaneous tunnel, approximately 4 cm caudal to the venous access site. A subcutaneous tunnel was created from the tunnel site to the venous access site in a retrograde fashion along with the 15.5 Eritrean dialysis catheter. The data Dacron cuff was inserted through the tunnel and placed subcutaneously. The catheter was then advanced through the peel-away sheath, and sheath removed. The tip of the catheter lies at the SVC/right atrial junction. The line flushes and aspirates appropriately. The catheter lines were both flushed with 10 cc of normal saline, and then blocked with 100 units/cc heparin. The catheter was sutured to the skin with nylon suture, and sterile bandaging was placed. IR THROMBECTOMY AVF PERC    1. Successful fistulogram of right arm dialysis AV graft. 2.  Successful thrombectomy of completely thrombosed graft 3. Successful balloon angioplasty of stenosis near the arterial anastomosis and venous anastomosis of the graft CLINICAL DATA: Entirely thrombosed graft RADIATION DOSE: 71.12 mGy. PROCEDURES PERFORMED: 1. Ultrasound guidance for vascular access. Ultrasound images saved to PACS. 2. Fluoroscopy-guided fistulogram. 3. IV conscious sedation, sedation time 100 minutes. 4. Thrombectomy of the completely thrombosed AV graft 5. Balloon angioplasty of stenosis at the arterial anastomosis and venous anastomosis PROCEDURE: Following the discussion of the procedure, alternatives, risks versus benefits, informed consent was obtained from the patient.   Specifically, risks of after-biopsy pain at the site, rare possibility of excessive hemorrhage, infection, injury to the adjacent organs were discussed and the patient verbalized understanding. Pre-procedure evaluation confirmed that the patient was an appropriate candidate for conscious sedation. Adequate sedation was maintained during the entire procedure. Vital signs, pulse oximetry, and response to verbal commands were monitored and recorded by the nurse throughout the procedure and the recovery period. The flow sheet was placed in the medical record including the medications and dosages used. The patient returned to baseline neurologic and physiologic status prior to leaving the department. No immediate sedation related complications were noted. Medication for conscious sedation was administered via IV route. 100 minutes of conscious sedation was provided. Following universal protocol, patient and site verification was performed with a \"timeout\" prior to the procedure. Informed and written consent was obtained from the patient following discussion of risks, benefits and alternatives to this procedure. The was patient placed supine on the angiographic table. The patient's right upper extremity brachial artery to brachial vein graft site was then prepped and draped in normal sterile fashion. A small amount of local lidocaine anesthesia was injected subcutaneously. Ultrasound was used to study the AV graft prior to accessing it. The vein was patent to the level of the proximal arm. Using ultrasound access, puncture was made of the graft in both the antegrade and separate retrograde fashion using a 21 GA needle's, ultrasound images saved to PACS for both access. A wire was advanced and a 6 Western Sid[place] short sheaths were placed. Through the sheath contrast was hand injected, and serial digital subtraction angiography of the graft was performed. The graft was completely thrombosed without any blood flow. Additionally there was severe stenosis at the arterial anastomosis and venous anastomosis site.  AngioJet catheter was advanced into the graft and the arterial anastomosis and AngioJet thrombectomy was performed. Repeat fistulogram demonstrated a thrombus with a severe stenosis at the arterial anastomosis and graft. Following that a 6 mm balloon was advanced to the arterial anastomosis and angioplasty was performed. Repeat fistulogram demonstrated now widely patent arterial anastomosis and arterial portion of the graft. Following that AngioJet catheter was advanced through the access into the venous anastomosis site and AngioJet thrombectomy was performed. Repeat fistulogram demonstrated a thrombus from the venous side, though there was severe venous anastomosis stenosis. Following that a 6 mm balloon was advanced and angioplasty was performed. Repeat fistulogram demonstrated improvement, with residual stenosis at the venous anastomosis site. Following that a 7 mm balloon was advanced and repeat angioplasty was performed. Repeat fistulogram demonstrated a now widely patent graft anteriorly with grade blood flow. The access sites were then sutured with a pursestring suture and sterile dressings were placed. There were no immediate complications. The patient was discharged in normal and stable condition. IR ULTRASOUND GUIDANCE VASCULAR ACCESS    1. Successful placement of a central venous catheter with subcutaneous tunnel in the internal jugular vein for dialysis. Radiation dose to the patient was: 15.11 mGy Additional clinical data: Thrombosed right arm graft Procedure: 1. Ultrasound guidance for vascular access. The ultrasound image of the blood vessel was saved to PACS. 2.   Fluoroscopic guidance for placement of a central line. 3.   Placement of a central venous line with subcutaneous tunnel using the left internal jugular vein. Body of Report: Informed and written consent was obtained from the patient following discussion of risks, benefits and alternatives to this procedure. The was patient placed supine on the angiographic table.   The patient's neck and chest were then prepped and draped in  normal sterile fashion. A small amount of local lidocaine anesthesia was injected subcutaneously. Ultrasound was used to study the jugular vein we intended to use prior to accessing it. The vein was patent. The ultrasound image of the blood vessel was saved to PACS. Using ultrasound access, puncture was made of the left internal jugular vein using a 21 GA needle. A wire was advanced into the IVC, a short incision was made at the puncture site and serial dilatation performed. A 16 Yakut peel-away sheath was then advanced into the superior vena cava/right atrial junction. Next an incision was made at the site of the subcutaneous tunnel, approximately 4 cm caudal to the venous access site. A subcutaneous tunnel was created from the tunnel site to the venous access site in a retrograde fashion along with the 15.5 [place] dialysis catheter. The data Dacron cuff was inserted through the tunnel and placed subcutaneously. The catheter was then advanced through the peel-away sheath, and sheath removed. The tip of the catheter lies at the SVC/right atrial junction. The line flushes and aspirates appropriately. The catheter lines were both flushed with 10 cc of normal saline, and then blocked with 100 units/cc heparin. The catheter was sutured to the skin with nylon suture, and sterile bandaging was placed. IR US HEMODIALYSIS ACCESS EVAL    1. Successful placement of a central venous catheter with subcutaneous tunnel in the internal jugular vein for dialysis. Radiation dose to the patient was: 15.11 mGy Additional clinical data: Thrombosed right arm graft Procedure: 1. Ultrasound guidance for vascular access. The ultrasound image of the blood vessel was saved to PACS. 2.   Fluoroscopic guidance for placement of a central line. 3.   Placement of a central venous line with subcutaneous tunnel using the left internal jugular vein. Body of Report:  Informed and written consent was obtained from the patient following discussion of risks, benefits and alternatives to this procedure. The was patient placed supine on the angiographic table. The patient's neck and chest were then prepped and draped in  normal sterile fashion. A small amount of local lidocaine anesthesia was injected subcutaneously. Ultrasound was used to study the jugular vein we intended to use prior to accessing it. The vein was patent. The ultrasound image of the blood vessel was saved to PACS. Using ultrasound access, puncture was made of the left internal jugular vein using a 21 GA needle. A wire was advanced into the IVC, a short incision was made at the puncture site and serial dilatation performed. A 16 Togolese peel-away sheath was then advanced into the superior vena cava/right atrial junction. Next an incision was made at the site of the subcutaneous tunnel, approximately 4 cm caudal to the venous access site. A subcutaneous tunnel was created from the tunnel site to the venous access site in a retrograde fashion along with the 15.5 Romansh dialysis catheter. The data Dacron cuff was inserted through the tunnel and placed subcutaneously. The catheter was then advanced through the peel-away sheath, and sheath removed. The tip of the catheter lies at the SVC/right atrial junction. The line flushes and aspirates appropriately. The catheter lines were both flushed with 10 cc of normal saline, and then blocked with 100 units/cc heparin. The catheter was sutured to the skin with nylon suture, and sterile bandaging was placed.               Labs:    Labs reviewed and discussed with patient and staff    Lab Results   Component Value Date/Time    POCGLU 175 07/01/2022 07:58 AM    POCGLU 169 07/01/2022 05:53 AM    POCGLU 183 07/01/2022 01:52 AM    POCGLU 207 06/30/2022 08:07 PM    POCGLU 263 06/30/2022 04:50 PM     Lab Results   Component Value Date/Time     07/01/2022 03:28 AM    K 4.8 07/01/2022 03:28 AM    CL 96 07/01/2022 03:28 AM    CO2 30 07/01/2022 03:28 AM    BUN 27 07/01/2022 03:28 AM    CREATININE 5.48 07/01/2022 03:28 AM    CALCIUM 9.6 07/01/2022 03:28 AM    LABALBU 3.7 07/01/2022 03:28 AM    BILITOT 1.3 07/01/2022 03:28 AM    ALKPHOS 126 07/01/2022 03:28 AM    AST 28 07/01/2022 03:28 AM    ALT 23 07/01/2022 03:28 AM     Lab Results   Component Value Date/Time    WBC 5.7 07/01/2022 03:28 AM    RBC 3.03 07/01/2022 03:28 AM    HGB 9.7 07/01/2022 03:28 AM    HCT 28.4 07/01/2022 03:28 AM    MCV 93.9 07/01/2022 03:28 AM    MCH 32.2 07/01/2022 03:28 AM    MCHC 34.3 07/01/2022 03:28 AM    RDW 15.2 07/01/2022 03:28 AM    PLT 80 07/01/2022 03:28 AM    MPV 10.0 03/05/2020 12:00 AM     Lab Results   Component Value Date/Time    VITD25 21 10/22/2019 12:00 AM     Lab Results   Component Value Date/Time    COLORU Yellow 04/20/2022 10:45 AM    LABSPEC 1.010 03/08/2016 12:00 AM    NITRU Negative 04/20/2022 10:45 AM    GLUCOSEU Negative 04/20/2022 10:45 AM    KETUA TRACE 04/20/2022 10:45 AM    UROBILINOGEN 1.0 04/20/2022 10:45 AM    BILIRUBINUR Negative 04/20/2022 10:45 AM    BILIRUBINUR 3+ 09/17/2021 12:49 PM     Lab Results   Component Value Date/Time    PROTIME 16.3 06/30/2022 07:30 AM     Lab Results   Component Value Date/Time    INR 1.3 06/30/2022 07:30 AM         I discussed results with patient. Current Rehabilitation Assessments:    Rehabilitation:  Physical Therapy  Bed mobility:     Transfers:     Gait:      Stairs:     W/C mobility:         Occupational therapy:                      Speech therapy:            Diet/Swallow:        Dysphagia Outcome Severity Scale: Level 1: Severe dysphagia- NPO. Unable to tolerate any PO safely     Therapeutic Interventions: Oral motor exercises,Patient/Family education          COGNITION  OT:    SP:              Re-vika pending      Past Medical History:        Diagnosis Date    Angina at rest [facility]) 5/24/2020    Anxiety     CAD S/P percutaneous coronary SECTION      x1    COLONOSCOPY  01/09/2014    Dr. Alexandria Jones      x1 Dr. Kennedi Dixon, Dr Yvonne Griffin  08/10/2021    [facility]    DIAGNOSTIC CARDIAC CATH LAB PROCEDURE  10/02/2019    DIALYSIS CATHETER INSERTION Left 06/03/2022    Tunneled Symetrex 15.5F x 23cm hemodialysis catheter inserted by Dr. Rashad Carvajal, TOTAL ABDOMINAL (CERVIX REMOVED)      one ovary intact, Dr Tigre Zhang, menorrhagia    IR THROMBECTOMY PERCUT AVF  6/6/2022    IR THROMBECTOMY PERCUT AVF 6/6/2022 [facility] PROCEDURE    IR TUNNELED CATHETER PLACEMENT GREATER THAN 5 YEARS  6/3/2022    IR TUNNELED CATHETER PLACEMENT GREATER THAN 5 YEARS 6/3/2022 [facility] PROCEDURE    AL TOTAL KNEE ARTHROPLASTY Left 06/21/2018    LEFT KNEE TOTAL KNEE ARTHROPLASTY, SHAYNA, NERVE BLOCK performed by Tawny Gilliland MD at [address] [place] [address] [facility]      TOE AMPUTATION Right     TOTAL KNEE ARTHROPLASTY  05/19/2016    Dr Jose L Juares TUNNELED [address] Right 07/01/2020    tunneled HD catheter per Dr Sherry Solitario:     Allergies   Allergen Reactions    Codeine Hives     hives    Oxycontin [Oxycodone Hcl] Hives        CurrentMedications:   Current Facility-Administered Medications: heparin (porcine) injection 2,000 Units, 2,000 Units, IntraVENous, Once  sodium chloride 0.9 % infusion, , ,   epoetin chadwick-epbx (RETACRIT) injection 10,000 Units, 10,000 Units, SubCUTAneous, Once  vancomycin (VANCOCIN) intermittent dosing (placeholder), , Other, RX Placeholder  melatonin tablet 3 mg, 3 mg, Oral, Nightly PRN  acetaminophen (TYLENOL) tablet 650 mg, 650 mg, Oral, Q6H PRN **OR** acetaminophen (TYLENOL) suppository 650 mg, 650 mg, Rectal, Q6H PRN  prochlorperazine (COMPAZINE) injection 10 mg, 10 mg, IntraVENous, TID PRN  albuterol (PROVENTIL) nebulizer solution 2.5 mg, 2.5 mg, Nebulization, Q4H PRN  ARIPiprazole (ABILIFY) tablet 5 mg, 5 mg, Oral, Daily  aspirin EC tablet 81 mg, 81 mg, Oral, Daily  atorvastatin (LIPITOR) tablet 40 mg, 40 mg, Oral, Nightly  hydrOXYzine HCl (ATARAX) tablet 25 mg, 25 mg, Oral, TID PRN  insulin glargine (LANTUS) injection vial 35 Units, 35 Units, SubCUTAneous, Nightly  isosorbide mononitrate (IMDUR) extended release tablet 120 mg, 120 mg, Oral, Daily  magnesium oxide (MAG-OX) tablet 400 mg, 400 mg, Oral, Daily  melatonin disintegrating tablet 10 mg, 10 mg, Oral, Nightly  midodrine (PROAMATINE) tablet 5 mg, 5 mg, Oral, Once per day on Mon Wed Fri  miconazole (MICOTIN) 2 % powder, , Topical, BID  sertraline (ZOLOFT) tablet 50 mg, 50 mg, Oral, Nightly  glucose chewable tablet 16 g, 4 tablet, Oral, PRN  dextrose bolus 10% 125 mL, 125 mL, IntraVENous, PRN **OR** dextrose bolus 10% 250 mL, 250 mL, IntraVENous, PRN  glucagon (rDNA) injection 1 mg, 1 mg, IntraMUSCular, PRN  dextrose 5 % solution, 100 mL/hr, IntraVENous, PRN  insulin lispro (HUMALOG) injection vial 0-12 Units, 0-12 Units, SubCUTAneous, Q4H      Social History:  Social History     Socioeconomic History    Marital status:      Spouse name: Not on file    Number of children: 2    Years of education: Not on file    Highest education level: Not on file   Occupational History    Occupation: disabled   Tobacco Use    Smoking status: Never Smoker    Smokeless tobacco: Never Used   Vaping Use    Vaping Use: Never used   Substance and Sexual Activity    Alcohol use: No     Alcohol/week: 0.0 standard drinks    Drug use: No    Sexual activity: Not Currently   Other Topics Concern    Not on file   Social History Narrative    Disabled, lives in [facility]    [facility] by     [email or web address] [place], one of 5    Twin sister Reyes, very ill in 2018, [place] 2019    Moved to [facility] [place], , 2 children, one son and one daughter    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 updates; patient lives with her daughter son-in-law and 2 grandchildren and patient's handicapped son. Per daughter, her brother is blind, MRDD, multiple health issues. Daughter's  is patient's legal guardian. Patient has hemodialysis Tuesday Thursday and Saturday. Patient's bedroom is on main floor with a half bath. Daughter walks patient upstairs once weekly for full bath. Patient is using her walker in the home. Patient has a hospital bed in the home. Social Determinants of Health     Financial Resource Strain: Low Risk     Difficulty of Paying Living Expenses: Not hard at all   Food Insecurity: No Food Insecurity    Worried About Running Out of Food in the Last Year: Never true    Yung of Food in the Last Year: Never true   Transportation Needs: No Transportation Needs    Lack of Transportation (Medical): No    Lack of Transportation (Non-Medical):  No   Physical Activity: Sufficiently Active    Days of Exercise per Week: 3 days    Minutes of Exercise per Session: 60 min   Stress:     Feeling of Stress : Not on file   Social Connections:     Frequency of Communication with Friends and Family: Not on file    Frequency of Social Gatherings with Friends and Family: Not on file    Attends Moravian Services: Not on file    Active Member of Clubs or Organizations: Not on file    Attends Club or Organization Meetings: Not on file    Marital Status: Not on file   Intimate Partner Violence:     Fear of Current or Ex-Partner: Not on file    Emotionally Abused: Not on file    Physically Abused: Not on file    Sexually Abused: Not on file   Housing Stability:     Unable to Pay for Housing in the Last Year: Not on file    Number of Places Lived in the Last Year: Not on file    Unstable Housing in the Last Year: Not on file        This history was obtained from family and caregivers and discussed to confirm. Family History:       Problem Relation Age of Onset   [employer] Cancer Mother 76        survived   [employer] Breast Cancer Mother     Hypertension Father     Diabetes Sister     Mental Illness Sister     Colon Cancer Neg Hx        Review of Systems:  Review of Systems   Constitutional: Positive for fatigue. Physical Exam:  BP (!) 129/50   Pulse 82   Temp 97.5 °F (36.4 °C) (Oral)   Resp 14   Ht 5' 7\" (1.702 m)   Wt 200 lb 9.6 oz (91 kg)   LMP  (LMP Unknown)   SpO2 100%   BMI 31.42 kg/m²      Physical Exam  Vitals reviewed. Constitutional:       General: She is not in acute distress. Appearance: She is well-developed. She is not ill-appearing, toxic-appearing or diaphoretic. Comments:         HENT:      Head: Normocephalic and atraumatic. Right Ear: Hearing normal.      Left Ear: Hearing normal.      Nose: Nose normal.      Mouth/Throat:      Mouth: No oral lesions. Dentition: Normal dentition. Pharynx: No oropharyngeal exudate. Eyes:      General: No scleral icterus. Right eye: No discharge. Left eye: No discharge. Conjunctiva/sclera: Conjunctivae normal.      Right eye: No chemosis or exudate. Left eye: No chemosis or exudate. Neck:      Thyroid: No thyromegaly. Vascular: No JVD. Trachea: No tracheal deviation. Pulmonary:      Effort: Pulmonary effort is normal. No tachypnea, bradypnea, accessory muscle usage or respiratory distress. Breath sounds: No wheezing. Chest:      Chest wall: No tenderness. Musculoskeletal:         General: Tenderness present.       Right shoulder: Normal.      Left shoulder: Normal.      Right upper arm: Normal.      Left upper arm: Normal.      Right elbow: Normal.      Left elbow: Normal.      Right forearm: Normal.      Left forearm: Normal.      Right wrist: Normal.      Left wrist: Normal.      Right hand: Normal.      Left hand: Normal.      Cervical back: Normal range of motion and neck supple. No edema or rigidity. Thoracic back: Normal.      Lumbar back: Tenderness and bony tenderness present. No swelling, edema, deformity or lacerations. Decreased range of motion. Right hip: Normal.      Left hip: Normal.      Right upper leg: Normal.      Left upper leg: Normal.      Right knee: Normal.      Left knee: Normal.      Right lower leg: Normal.      Left lower leg: Normal.      Right ankle: Normal.      Right Achilles Tendon: Normal.      Left ankle: Normal.      Left Achilles Tendon: Normal.      Right foot: Normal.      Left foot: Normal.      Comments: Tender areas are indicated by numbered spot         Skin:     General: Skin is warm and dry. Coloration: Skin is not pale. Findings: No abrasion, bruising, ecchymosis, erythema, laceration, petechiae or rash. Rash is not macular, pustular or urticarial.      Nails: There is no clubbing. Neurological:      Mental Status: She is alert and oriented to person, place, and time. Cranial Nerves: No cranial nerve deficit. Sensory: No sensory deficit. Coordination: Coordination normal.      Gait: Gait normal.   Psychiatric:         Attention and Perception: She is attentive. Mood and Affect: Mood is not anxious or depressed. Affect is not labile, blunt, angry or inappropriate. Speech: She is communicative. Speech is not rapid and pressured, delayed, slurred or tangential.         Behavior: Behavior normal. Behavior is not agitated, slowed, aggressive, withdrawn, hyperactive or combative. Thought Content: Thought content normal. Thought content is not paranoid or delusional. Thought content does not include homicidal or suicidal ideation. Thought content does not include homicidal or suicidal plan.          Cognition and Memory: Memory is not impaired. She does not exhibit impaired recent memory or impaired remote memory. Judgment: Judgment normal. Judgment is not impulsive or inappropriate. Ortho Exam  Neurologic Exam     Mental Status   Oriented to person, place, and time. Speech: not slurred   Level of consciousness: alert  Knowledge: good.              Diagnostics:    Recent Results (from the past 24 hour(s))   POCT Glucose    Collection Time: 06/30/22  4:50 PM   Result Value Ref Range    POC Glucose 263 (H) 70 - 99 mg/dl    Performed on ACCU-CHEK    POCT Glucose    Collection Time: 06/30/22  8:07 PM   Result Value Ref Range    POC Glucose 207 (H) 70 - 99 mg/dl    Performed on ACCU-CHEK    POCT Glucose    Collection Time: 07/01/22  1:52 AM   Result Value Ref Range    POC Glucose 183 (H) 70 - 99 mg/dl    Performed on ACCU-CHEK    CBC with Auto Differential    Collection Time: 07/01/22  3:28 AM   Result Value Ref Range    WBC 5.7 4.8 - 10.8 K/uL    RBC 3.03 (L) 4.20 - 5.40 M/uL    Hemoglobin 9.7 (L) 12.0 - 16.0 g/dL    Hematocrit 28.4 (L) 37.0 - 47.0 %    MCV 93.9 82.0 - 100.0 fL    MCH 32.2 (H) 27.0 - 31.3 pg    MCHC 34.3 33.0 - 37.0 %    RDW 15.2 (H) 11.5 - 14.5 %    Platelets 80 (L) 103 - 400 K/uL    PLATELET SLIDE REVIEW Decreased     Neutrophils % 74.9 %    Lymphocytes % 14.5 %    Monocytes % 8.2 %    Eosinophils % 2.0 %    Basophils % 0.4 %    Neutrophils Absolute 4.3 1.4 - 6.5 K/uL    Lymphocytes Absolute 0.8 (L) 1.0 - 4.8 K/uL    Monocytes Absolute 0.5 0.2 - 0.8 K/uL    Eosinophils Absolute 0.1 0.0 - 0.7 K/uL    Basophils Absolute 0.0 0.0 - 0.2 K/uL   Comprehensive Metabolic Panel w/ Reflex to MG    Collection Time: 07/01/22  3:28 AM   Result Value Ref Range    Sodium 138 135 - 144 mEq/L    Potassium reflex Magnesium 4.8 3.4 - 4.9 mEq/L    Chloride 96 95 - 107 mEq/L    CO2 30 20 - 31 mEq/L    Anion Gap 12 9 - 15 mEq/L    Glucose 151 (H) 70 - 99 mg/dL    BUN 27 (H) 8 - 23 mg/dL    CREATININE 5.48 (H) 0.50 - 0.90 mg/dL    GFR Non- 7.8 (L) >60    GFR  9.5 (L) >60    Calcium 9.6 8.5 - 9.9 mg/dL    Total Protein 6.3 6.3 - 8.0 g/dL    Albumin 3.7 3.5 - 4.6 g/dL    Total Bilirubin 1.3 (H) 0.2 - 0.7 mg/dL    Alkaline Phosphatase 126 40 - 130 U/L    ALT 23 0 - 33 U/L    AST 28 0 - 35 U/L    Globulin 2.6 2.3 - 3.5 g/dL   POCT Glucose    Collection Time: 07/01/22  5:53 AM   Result Value Ref Range    POC Glucose 169 (H) 70 - 99 mg/dl    Performed on ACCU-CHEK    POCT Glucose    Collection Time: 07/01/22  7:58 AM   Result Value Ref Range    POC Glucose 175 (H) 70 - 99 mg/dl    Performed on ACCU-CHEK               Impression:    1. Impaired mobility and ADLs due to [address] sepsis  2. Factors favoring recovery include:  improvements in nutrition and PO intake        Complex Active General Medical Issues that complicate care and require daily medical supervision: 1. Principal Problem:    MRSA bacteremia  Active Problems:    Impaired mobility and activities of daily living    Dialysis patient [facility])    Multiple closed fractures of right lower extremity and ribs    Closed T11 fracture (HCC)    Encephalopathy acute    Chronic diastolic congestive heart failure (HCC)    Closed rib fracture  Resolved Problems:    * No resolved hospital problems. *            Recommendations:    1. Considering all of the factors above including the patient's current medical status, social status/home environment, their functional needs, and their ability to participate in a therapy program, I feel that they may best be served at:    acute intensive comprehensive inpatient rehabilitation program.  Due to the diagnoses above the patient has had significant decline in function and is now requiring acute intensive therapy to optimize function. They are expected to achieve meaningful functional gains.   Due to medical complexity as above, rehabilitation physician services is reasonable and necessary including face-to-face visits at least 3 days/week with anticipated need to treat, manage and modify the rehabilitation course of treatment including interdisciplinary team conferences. They will require rehab physician care to monitor for neurogenic bowel bladder, postoperative pain, titration of opiate and high risk medications,   blood pressure and blood sugar control. It is my opinion that they will be able to tolerate and benefit from 3 hours of therapy a day. I reviewed the various options re: levels of care with the patient and family. Please see pre-admission screen note for further details. I discussed acute rehab with the patient and verify that the patient is able and willing to participate in 3 hours of therapy a day. Rehab and Acute Care Case Management has also reinforced this expectation. This patient requires multidisciplinary rehabilitation treatment, including daily care and management from a PM&R physician, 24-hour rehabilitation nursing, Physical Therapy, Occupational Therapy, rehabilitation psychology, consideration of speech and language pathology, recreational therapy, nutritional services, and a rehabilitation . I feel that it is reasonable to plan for a discharge to home setting after acute rehab. 2. Specialized nursing care to focus on:  1. Bowel and bladder issues-Monitor for urinary retention-check PVRs, bladder scan--cath if no void. 2. Wound management re   -pressure relief protocols-side to side turns  3. IV medication administration  IV Vanco-completed    3. Monitor endurance and if necessary spread therapy out over a 7-day window-adding scheduled rest breaks when needed. Focus on energy conservation. Monitor heart rate and   cardiac medications effects on heart rate and blood pressure before, during and after therapy. Progress toward endurance training with pulse ox monitoring for saturation and heart rate.     4. monitoring for dysphagia-- Improve hydration and nutrition by adding Vitamin B12 shot times one, adding Protein supplements and push PO fluids. 5.   Monitor for higher level cognitive deficits, focus on difficulty with sequencing and problem-solving. 6. Focus on higher-level balance and falls risk issues focusing on balance training and monitoring for orthostasis. Above recommendations are indicated to address medical complexity and need for appropriate rehab services. Will tailor individual care and rehab plan per individuals needs re  Therapy on hold. Focus of today's plan-   HD      Required Certification Data (potential inpatient rehabilitation facility patient's only)    Deficits:weakness, nutrition, mobility, impaired gait, deconditioning , debility, adjustment to disability and pain limiting function    Disability:mobility, locomotion, self care and bowel/ bladder status    Potential barriers to progress/discharge:severe pain         It was my pleasure to evaluate [address] today. Please call [phone] with questions.     Kristin Murillo, DO

## 2022-07-01 NOTE — PROGRESS NOTES
Pharmacy Note  Vancomycin Consult    Alex Wang is a 57 y.o. female started on Vancomycin for bactermia-MRSA; consult received from Dr. Meme Mujica, DO  to manage therapy. Weakness [R53.1]  Physical deconditioning [R53.81]  Bilateral pleural effusion [J90]  At high risk for injury related to fall [Z91.81]  Concussion without loss of consciousness, initial encounter [S06.0X0A]  Fall from standing, initial encounter [W19. XXXA]  Chronic renal failure, stage 5 (HCC) [N18.5]  Closed fracture of multiple ribs of right side, initial encounter [S22.41XA]  Class 1 obesity due to excess calories with serious comorbidity and body mass index (BMI) of 33.0 to 33.9 in adult [E66.09, Z68.33]  Allergies: Codeine and Oxycontin [oxycodone hcl]    Temp max: 99.5    Cultures  Recent Labs     06/30/22  0810   BC Gram stain aerobic bottle  Gram stain anaerobic bottle  Gram positive cocci in chains and pairs- resembling Strep  2 out of 2 blood cultures  Further results to follow  Further testing performed at [address] [place] [address] Gram stain aerobic bottle  Gram stain anaerobic bottle  Gram positive cocci in chains and pairs- resembling Strep  2 out of 2 blood cultures  Further results to follow  Further testing performed at [facility] [address] Not Detected     Height: 5' 7\" (170.2 cm), Weight: 200 lb 9.6 oz (91 kg), Body mass index is 31.42 kg/m². MRSA Nasal swab:N/a, does not meet criteria    Recent Labs     07/01/22  0328   CREATININE 5.48*   Estimated Creatinine Clearance: 12 mL/min (A) (based on SCr of 5.48 mg/dL (H)). .    Assessment/Plan:  Will initiate Vancomycin with a one time loading dose of 2000 mg x1 (Physician would like vancomycin dose given during HD not post HD today. Random level prior to next HD session to further evaluate dosing. Timing of future trough levels may be adjusted based on culture results, renal function, and clinical response. Thank you for the consult. Will continue to follow.   Lisha Cervantes [facility] - [place] PharmD

## 2022-07-01 NOTE — PROGRESS NOTES
Pulse Ox remained stable throughout the night averaging in the high 90s. Spoke with daughter and gave update.

## 2022-07-01 NOTE — CONSULTS
[facility] Neurology Consult Note  Name: Apolinar Rhoades  Age: 57 y.o. Gender: female  CodeStatus: Full Code  Allergies: Codeine  Oxycontin [Oxycodone Hcl]    Chief Complaint:Back Pain (since fall last night from standing)    Primary Care Provider: Em Asencio MD  InpatientTreatment Team: Treatment Team: Attending Provider: Sonido Aparicio DO; Consulting Physician: Leatha Sanders MD; Consulting Physician: Kevin Thurston DO; Consulting Physician: Swathi Leal MD; Registered Nurse: Zion Stafford RN; : Candice Neumann, RN; Utilization Reviewer: Ruby Gibson RN; : Bob Guardado, FLAQUITO; LPN: Brianne Chris LPN; : Lonny Desai RN; Consulting Physician: Jn Haas MD; Consulting Physician: Kathi Singh MD  Admission Date: 6/30/2022      HPI   Consulting provider: Dr. Johnny Cronin for abnormal confusion/weakness status post recent fall. Hemodialysis patient at baseline  Pt seen and examined while in dialysis for neurology consult. Patient is a 75-year-old  female with past medical history of diabetes mellitus type 2, schizophrenia obesity diabetic neuropathy, neurogenic bladder, metabolic syndrome, hyperlipidemia, hypertension, hepatitis C, seizures, end-stage renal disease on hemodialysis, COPD, CHF, CAD status post PCI, anxiety who presented to [facility] emergency room on 6/30/2022 post fall with subsequent back pain. Patient reports fall was from a misstep and she fell straight backwards hitting the back of her head. No loss of consciousness. Felt \"out of it\" in the ED. Patient does take aspirin 81 mg daily. CT of the head in the ED negative for acute findings. CT of the cervical spine showed advanced degenerative changes. CT of the thoracic spine with old T11 compression fracture. CT of the lumbar spine with mild degenerative arthritis. She is noted to have right posterior 10th and 11th rib fractures.   Patient was unable to complete dialysis on the day of presentation as she was too weak and confused. Initial laboratory testing remarkable for a low hemoglobin of 9.1 and low hematocrit 26.5. This appears to be consistent with her baseline. She has thrombocytopenia with a platelet count of 92. Creatinine elevated at 4.37. Glucose 226. Total bilirubin 1.4. Alk phos 132. AST 40. INR 1.3. Troponin 0.037. Blood cultures positive 2 out of 2 for gram-positive cocci in chains and pairs resembling strep. CRP elevated at 26.8  This admission patient had episode of choking with associated hypoxia. Modified barium swallow has been ordered and is pending. Patient remains NPO. On review of medical records it appears patient had a coronary artery bypass graft x1 vessel with tricuspid valve repair on 1/21/2022    Patient is currently alert and oriented x2, no acute distress, cooperative. Encephalopathy is improving. No focal neurodeficits. Mild headache. No vision changes. No seizure activity noted. No dizziness or lightheadedness. Blood pressure stable. Afebrile. Generalized weakness noted. She does report ongoing weakness in her lower extremities that is chronic for her. Denies history of previous spinal surgeries. Does have bilateral peripheral neuropathy. Areflexic in the lower extremities. Strength 4 out of 5. There is noted history of seizures. When asked about this patient reports she has had seizures in the distant past but none recently. She is not on medication for seizures currently. She does not know the cause of her seizures and is not followed by neurology according to her. Seen in isolation to the nurse petitioner and history obtained and evaluated. Patient has had a few falls and she reports that she had fallen 3 times in the last week. He has not had any significant issues but does complain of back pain. When she falls she needs her family help to sit up.   Encephalopathy has been improving. Vitals:    07/01/22 1033   BP:    Pulse:    Resp:    Temp: 97.5 °F (36.4 °C)   SpO2:      Family History   Problem Relation Age of Onset    Cancer Mother 76        survived    Breast Cancer Mother     Hypertension Father     Diabetes Sister     Mental Illness Sister     Colon Cancer Neg Hx      Social History     Socioeconomic History    Marital status:      Spouse name: Not on file    Number of children: 2    Years of education: Not on file    Highest education level: Not on file   Occupational History    Occupation: disabled   Tobacco Use    Smoking status: Never Smoker    Smokeless tobacco: Never Used   Vaping Use    Vaping Use: Never used   Substance and Sexual Activity    Alcohol use: No     Alcohol/week: 0.0 standard drinks    Drug use: No    Sexual activity: Not Currently   Other Topics Concern    Not on file   Social History Narrative    Disabled, lives in [facility] [place] is close by     [place] in [place], one of 5    Twin sister Reyes, very ill in 2018, [place] [address]    Moved to [address] [place] [address], , 2 children, one son and one daughter    Worked at [employer], as a nurse's aide    Disabled due to mental illness    Lived at [facility], was discharged, returned to independent living in 2017 in the daughter's house and has adjusted well    One son and one daughter, live in the same house with patient, Alcon Casillas pays the rent    [employer] reading [employer])             10/11/2021 [facility] updates; patient lives with her daughter son-in-law and 2 grandchildren and patient's handicapped son. Per daughter, her brother is blind, MRDD, multiple health issues. Daughter's  is patient's legal guardian. Patient has hemodialysis Tuesday Thursday and Saturday. Patient's bedroom is on main floor with a half bath. Daughter walks patient upstairs once weekly for full bath. Patient is using her walker in the home. Patient has a hospital bed in the home. Social Determinants of Health     Financial Resource Strain: Low Risk     Difficulty of Paying Living Expenses: Not hard at all   Food Insecurity: No Food Insecurity    Worried About [address] in the Last Year: Never true    [address] in the Last Year: Never true   Transportation Needs: No Transportation Needs    Lack of Transportation (Medical): No    Lack of Transportation (Non-Medical): No   Physical Activity: Sufficiently Active    Days of Exercise per Week: 3 days    Minutes of Exercise per Session: 60 min   Stress:     Feeling of Stress : Not on file   Social Connections:     Frequency of Communication with Friends and Family: Not on file    Frequency of Social Gatherings with Friends and Family: Not on file    Attends Buddhist Services: Not on file    Active Member of Clubs or Organizations: Not on file    Attends Club or Organization Meetings: Not on file    Marital Status: Not on file   Intimate Partner Violence:     Fear of Current or Ex-Partner: Not on file    Emotionally Abused: Not on file    Physically Abused: Not on file    Sexually Abused: Not on file   Housing Stability:     Unable to Pay for Housing in the Last Year: Not on file    Number of [place] in the Last Year: Not on file    Unstable Housing in the Last Year: Not on file        Review of Systems   Constitutional: Negative for fatigue and fever. HENT: Negative for hearing loss and trouble swallowing. Eyes: Negative for visual disturbance. Respiratory: Negative for cough, chest tightness, shortness of breath and wheezing. Cardiovascular: Negative for chest pain, palpitations and leg swelling. Gastrointestinal: Negative for abdominal distention, abdominal pain, nausea and vomiting. Genitourinary: Negative for difficulty urinating. Musculoskeletal: Positive for gait problem. Negative for back pain, neck pain and neck stiffness. Skin: Negative for color change and rash.    Neurological: Positive for weakness, numbness and headaches. Negative for dizziness, tremors, seizures, syncope, facial asymmetry, speech difficulty and light-headedness. Psychiatric/Behavioral: Positive for confusion. Negative for agitation and hallucinations. The patient is not nervous/anxious. Physical Exam  Vitals and nursing note reviewed. Constitutional:       General: She is not in acute distress. Appearance: She is not diaphoretic. HENT:      Head: Normocephalic and atraumatic. Eyes:      General: No visual field deficit. Extraocular Movements: Extraocular movements intact. Pupils: Pupils are equal, round, and reactive to light. Cardiovascular:      Rate and Rhythm: Normal rate and regular rhythm. Pulmonary:      Effort: Pulmonary effort is normal. No respiratory distress. Breath sounds: Normal breath sounds. Abdominal:      General: Bowel sounds are normal. There is no distension. Palpations: Abdomen is soft. Tenderness: There is no abdominal tenderness. Skin:     General: Skin is warm and dry. Neurological:      Mental Status: She is alert. She is disoriented. Cranial Nerves: No cranial nerve deficit, dysarthria or facial asymmetry. Sensory: Sensory deficit present. Motor: Weakness (4/5 generalized) present. No tremor, atrophy, abnormal muscle tone, seizure activity or pronator drift. Coordination: Coordination normal. Finger-Nose-Finger Test normal.      Gait: Abnormal gait:   Unable to walk patient as she is getting dialysis. Deep Tendon Reflexes: Reflexes abnormal.      Reflex Scores:       Patellar reflexes are 0 on the right side and 0 on the left side. Achilles reflexes are 0 on the right side and 0 on the left side.               Medications:  Reviewed    Infusion Medications:    sodium chloride      dextrose       Scheduled Medications:    heparin (porcine)  2,000 Units IntraVENous Once    epoetin chadwick-epbx  10,000 Units SubCUTAneous Once vancomycin (VANCOCIN) intermittent dosing (placeholder)   Other RX Placeholder    vancomycin  2,000 mg IntraVENous Once    ARIPiprazole  5 mg Oral Daily    aspirin EC  81 mg Oral Daily    atorvastatin  40 mg Oral Nightly    insulin glargine  35 Units SubCUTAneous Nightly    isosorbide mononitrate  120 mg Oral Daily    magnesium oxide  400 mg Oral Daily    melatonin  10 mg Oral Nightly    midodrine  5 mg Oral Once per day on Mon Wed Fri    miconazole   Topical BID    sertraline  50 mg Oral Nightly    insulin lispro  0-12 Units SubCUTAneous Q4H     PRN Meds: melatonin, acetaminophen **OR** acetaminophen, prochlorperazine, albuterol, hydrOXYzine HCl, glucose, dextrose bolus **OR** dextrose bolus, glucagon (rDNA), dextrose    Labs:   Recent Labs     06/30/22 0730 07/01/22  0328   WBC 7.3 5.7   HGB 9.1* 9.7*   HCT 26.5* 28.4*   PLT 92* 80*     Recent Labs     06/30/22  0730 07/01/22  0328    138   K 4.0 4.8   CL 95 96   CO2 30 30   BUN 15 27*   CREATININE 4.37* 5.48*   CALCIUM 9.0 9.6     Recent Labs     06/30/22  0730 07/01/22  0328   AST 40* 28   ALT 25 23   BILITOT 1.4* 1.3*   ALKPHOS 132* 126     Recent Labs     06/30/22  0730   INR 1.3     Recent Labs     06/30/22  0730   CKTOTAL 28   TROPONINI 0.037*       Urinalysis:   Lab Results   Component Value Date/Time    NITRU Negative 04/20/2022 10:45 AM    WBCUA >100 04/20/2022 10:45 AM    BACTERIA MANY 04/20/2022 10:45 AM    RBCUA 3-5 04/20/2022 10:45 AM    BLOODU TRACE 04/20/2022 10:45 AM    SPECGRAV 1.014 04/20/2022 10:45 AM    GLUCOSEU Negative 04/20/2022 10:45 AM       Radiology:   Most recent    EEG No valid procedures specified. MRI of Brain No results found for this or any previous visit. No results found for this or any previous visit. MRA of the Head and Neck: No results found for this or any previous visit. No results found for this or any previous visit. No results found for this or any previous visit. CT of the Head: Results for orders placed during the hospital encounter of 06/30/22    CT HEAD WO CONTRAST    Narrative  CT HEAD WO CONTRAST : 6/30/2022    CLINICAL HISTORY:  fall from standing last night confused at dialysis c/o back pain . COMPARISON: 10/11/2021. TECHNIQUE: Spiral unenhanced images were obtained of the head, with routine multiplanar reconstructions performed. All CT scans at this facility use dose modulation, iterative reconstruction, and/or weight based dosing when appropriate to reduce radiation dose to as low as reasonably achievable. FINDINGS:    There is no intracranial hemorrhage, mass effect, midline shift, extra-axial collection, evidence of hydrocephalus, recent ischemic infarct, or skull fracture identified. Chronic volume loss and mild mucosal thickening is again noted within the maxillary sinuses. The mastoid air cells and other visualized paranasal sinuses are essentially clear. Impression  NO ACUTE INTRACRANIAL PROCESS OR SIGNIFICANT CHANGE FROM 10/11/2021 IDENTIFIED. No results found for this or any previous visit. No results found for this or any previous visit. Carotid duplex: No results found for this or any previous visit. No results found for this or any previous visit.   Results for orders placed during the hospital encounter of 12/01/21    US CAROTID ARTERY BILATERAL    Narrative  EXAMINATION:  CAROTID DUPLEX ULTRASONOGRAPHY    CLINICAL HISTORY:  DIZZINESS AND CAROTID STENOSIS    COMPARISONS:  October 9, 2017    TECHNIQUE:  B-mode, color flow and spectral Doppler    FINDINGS:    ARTERIAL BLOOD FLOW VELOCITY    RIGHT PS    Prox CCA    88 cm/s  Mid CCA     71 cm/s  Dist CCA    71 cm/s  Prox ICA    63 cm/s  Mid ICA     126 cm/s  Dist ICA    116 cm/s  Prox ECA    93 cm/s  Prox VERT   41 cm/s    ICA/CCA     1.78    LEFT PS    Prox CCA    183 cm/s  Mid CCA     96 cm/s  Dist CCA    82 cm/s  Prox ICA    129 cm/s  Mid ICA     110 cm/s  Dist ICA on 7/1/2022 at 11:10 AM

## 2022-07-01 NOTE — PROCEDURES
[facility]   [place] [facility] [facility] [place]  Speech Language Pathology  Modified Barium Swallow (MBS)    Debbi Caruso  : 1960 (57 y.o.)   [x]   confirmed    MRN: [id number]  [id number]  ADMISSION DATE: 2022  PATIENT DIAGNOSIS(ES): Weakness [R53.1]  Physical deconditioning [R53.81]  Bilateral pleural effusion [J90]  At high risk for injury related to fall [Z91.81]  Concussion without loss of consciousness, initial encounter [S06.0X0A]  Fall from standing, initial encounter [W19. XXXA]  Chronic renal failure, stage 5 (HCC) [N18.5]  Closed fracture of multiple ribs of right side, initial encounter [S22.41XA]  Class 1 obesity due to excess calories with serious comorbidity and body mass index (BMI) of 33.0 to 33.9 in adult [E66.09, Z68.33]  Chief Complaint   Patient presents with    Back Pain     since fall last night from standing     Patient Active Problem List    Diagnosis Date Noted    Unstable angina ([address] 2022    Chest pain     MRSA bacteremia 2022    Sepsis due to Enterococcus ([address]     Local infection due to central venous catheter     Impaired mobility and activities of daily living 2022    Closed T11 fracture ([address] 2022    Encephalopathy acute 2022    Unspecified open wound of right upper arm, initial encounter 2022    Hyperkalemia 2022    CKD (chronic kidney disease) stage 4, GFR 15-29 ml/min ([address] 2022    Dialysis patient ([address] 2022    Multiple closed fractures of right lower extremity and ribs 2022    NSTEMI (non-ST elevated myocardial infarction) ([address] 2022    Anginal chest pain at rest [facility]) 2022    Hemodialysis-associated hypotension 10/22/2021    Chronic pain of both shoulders 2021    Nonrheumatic mitral valve regurgitation 2021    Nonrheumatic tricuspid valve regurgitation 2021    Need for extended care facility 2021    Multiple closed fractures of ribs of right side 04/05/2021    Depression 02/26/2021    Chronic obstructive pulmonary disease ([address] 02/26/2021    Critical illness polyneuropathy ([address] 02/26/2021    Compression fracture of spine ([address] 02/19/2021    Closed rib fracture 02/19/2021    Chest wall contusion, left, initial encounter 02/18/2021    Falls frequently 01/19/2021    Post PTCA     Headache, unspecified 01/03/2021    COVID-19 12/26/2020    Moderate persistent asthma without complication 30/56/6789    Weakness 08/05/2020    Difficulty in walking 07/27/2020    Atherosclerotic heart disease of native coronary artery with unspecified angina pectoris ([address] 07/03/2020    Essential (primary) hypertension 07/03/2020    Pain, unspecified 07/03/2020    ESRD (end stage renal disease) on dialysis ([address] 07/03/2020    Other seizures ([address] 07/03/2020    Paranoid schizophrenia ([address] 07/03/2020    Renal failure 06/28/2020    Recurrent falls 06/16/2020    Edema 12/23/2019    Closed supracondylar fracture of right humerus 12/23/2019    Other chronic pain 12/23/2019    Palliative care patient 12/23/2019    Class 2 severe obesity with serious comorbidity and body mass index (BMI) of 36.0 to 36.9 in [facility]) 12/03/2019    Sleep apnea, unspecified 11/05/2019    Pulmonary hypertension, unspecified ([address] 11/05/2019    Chronic diastolic congestive heart failure ([address] 10/04/2019    Shortness of breath 10/02/2019    Tardive dyskinesia 05/16/2019    Angina, class II ([address]     Controlled type 2 diabetes mellitus with diabetic neuropathy, with long-term current use of insulin ([address] 02/24/2017    Other specified diabetes mellitus with diabetic neuropathy, unspecified ([address] 08/04/2016    Stented coronary artery-plan is to stay on Plavix indefinately per Dr Lori Clements 06/02/2016    History of seizures     Urinary incontinence due to cognitive impairment     History of type C viral hepatitis     Diabetic nephropathy with proteinuria [facility]  Incontinence of urine 04/07/2014    Vitamin D insufficiency 02/04/2014    Vitamin B 12 deficiency 06/05/2013    Cerebral microvascular disease 06/05/2013    Hemiparesis, left ([address] 01/01/2013    Schizophrenia, paranoid, chronic     Metabolic syndrome     Mixed hyperlipidemia 12/09/2010    Other hammer toe (acquired) 12/13/2007     Past Medical History:   Diagnosis Date    Angina at rest [facility]) 5/24/2020    Anxiety     CAD S/P percutaneous coronary angioplasty 2015, 2018    stents per dr Asha Bloom    CHF (congestive heart failure) ([address]     CKD (chronic kidney disease) stage 4, GFR 15-29 ml/min ([address] 2/24/2018    CKD stage 4 due to type 2 diabetes mellitus ([address]     Contusion of right chest wall 2/16/2021    COPD (chronic obstructive pulmonary disease) ([address]     Diabetic nephropathy with proteinuria ([address] 2014    DJD (degenerative joint disease) of knee     Dr Gregorio Acuña GERD (gastroesophageal reflux disease)     Hemiparesis, left ([address] 2013    entered Assisted Living ([facility])    Hemodialysis patient [facility])     Hemodialysis-associated hypotension 10/22/2021    History of heart failure     History of seizures     History of type C viral hepatitis     HTN (hypertension)     Hyperlipidemia     Impaired mobility and activities of daily living     Mediastinal lymphadenopathy 2013    [facility]    Metabolic syndrome     Moderate persistent asthma without complication 7/52/5018    Need for extended care facility 7/7/2021    Neurogenic urinary incontinence 2013    Neuropathy in diabetes [facility])     Nonrheumatic mitral valve regurgitation 7/7/2021    Nonrheumatic tricuspid valve regurgitation 7/7/2021    Obesity (BMI 30-39. 9)     Recurrent UTI     S/P colonoscopy 2014    [facility], focal active colitis    Schizophrenia, paranoid, chronic ([address]     [facility] [place]   [employer] vessel disease, cerebrovascular 2013    Status post total knee replacement, right     Status impairment  Penetration: None  Aspiration/Timing: No evidence of aspiration  Pharyngeal Clearance: Vallecular residue; Less than 10%    Swallowing Study Trial 2  Consistencies Administered: Soft and Bite-Sized  How Presented: SLP-fed/Presented  How much presented:  (2 trials)  Bolus Acceptance : No impairment  Bolus Formation/Control: No impairment  Type of Impairment: Incomplete;Mastication  Propulsion: Discoordination  Oral Residue: 10-50% of bolus  Initiation of Swallow: Triggered at pyriform sinus(es)  Timing: Pooling 1-5 sec  Penetration: Flash/transient;During swallow  Aspiration/Timing: No evidence of aspiration  Pharyngeal Clearance: Vallecular residue;Pyriform residue;10-50%    Swallowing Study Trial 3  Consistencies Administered: Thin - straw; Thin - cup  How Presented: Self-fed/presented  How much presented:  (4-6 oz)  Bolus Acceptance : No impairment  Bolus Formation/Control: No impairment  Propulsion: No impairment  Oral Residue: Less than 10% of bolus  Initiation of Swallow: Triggered at pyriform sinus(es)  Timing: Pooling 1-5 sec  Penetration: Flash/transient  Aspiration/Timing: No evidence of aspiration  Pharyngeal Clearance: Vallecular residue;Pyriform residue; Less than 10%      Dysphagia Diagnosis  Swallowing Physiology  Decreased Tongue Base Retraction?: Mild  Laryngeal Elevation: WFL (within functional limits)  Penetration-Aspiration Scale (PAS): 2 - Material enters the airway, remains above the vocal folds, and is ejected from the airway    Findings  Oral Phase Severity: Mild-moderate  Pharyngeal Phase Severity: Mild          Penetration-Aspiration Scale  Puree 1 Material does not enter the airway   Soft  Thin 2 Material enters the airway, remains above the vocal folds, and is ejected from the airway    3 Material enters the airway, remains above the vocal folds, and is not ejected from the airway    4 Material enters the airway, contacts the vocal folds, an is ejected from the airway    5 tolerate po trials of soft and bite size with adequate mastication and no pocketing or s/s of aspiration 100%x. Goal 4: Pt will use safe swallow strategies of upright positioning, small bites/sips, and slow rate with min cues. Safety Devices  Safety Devices  Safety Devices in place: Yes  Type of devices: All fall risk precautions in place    Pain Assessment  Pain Assessment: Patient does not c/o pain    Pain Re-assessment  Pain Reassessment: Patient does not c/o pain    DYSPHAGIA OUTCOMES SEVERITY SCALE:  Dysphagia Outcome Severity Score  Dysphagia Outcome Severity Scale: Level 4: Mild moderate dysphagia- Intermittent supervision/cueing. One - two diet consistencies restricted      Radiologist available on consult as needed; Radiology technician present. Thank you for your referral.  Images are available through PACS. Please direct any questions or concerns to Speech Pathology. See radiologist report for additional details. Therapy Time  SLP Individual Minutes  Time In: 7309  Time Out: 7119  Minutes: 12              Signature: Electronically signed by Cecelia Singh.  ANN MARIE Lynch on 7/1/2022 at 4:15 PM

## 2022-07-01 NOTE — PROGRESS NOTES
07/01/22     From: Home with daughter. Uses [employer]. Has [employer] aides 4 hours per day     Admit: Back pain. S/P TWO FALLS OVER THE PAST WEEK. CONFUSION AND WEAKNESS. RIB FXS     PMH: CHF, COPD, CAD, CKD, HD, Seizures,   DM II, Schizophrenia,   Anticipated Discharge Disposition: Home     Patient Mobility or PT/OT ordered: Yes  Consults: Nephrology, Neurology, Physical medicine rehab     Clinical:      Barriers to Discharge: Dialysis. BARIUM SWALLOW.  [facility]      Assessments: CMI and DCCOP Done

## 2022-07-01 NOTE — PROGRESS NOTES
Progress Note  Khai Haney       QUTS:[id number]  Patient Name:Michelle Loyd     YOB: 1960     Age:62 y.o. Subjective      Plan for dialysis today per nephrology. Late morning new result: 2 of 2 gram-positive MRSA bacteremia, with possible superimposed Enterococcus faecalis bacteremia. (Patient away from room during multiple trips to the floor. Will see next day. Case actively managed throughout the day, including review of VS and new results, discussions with nursing and consultants involved in the management of the case, and new orders as appropriate.)    Objective         Vitals Last 24 Hours:  TEMPERATURE:  Temp  Av.8 °F (37.1 °C)  Min: 98.1 °F (36.7 °C)  Max: 99.5 °F (37.5 °C)  RESPIRATIONS RANGE: Resp  Av.5  Min: 14  Max: 20  PULSE OXIMETRY RANGE: SpO2  Av.3 %  Min: 79 %  Max: 100 %  PULSE RANGE: Pulse  Av.5  Min: 69  Max: 93  BLOOD PRESSURE RANGE: Systolic (16XSQ), CEJ:079 , Min:109 , DL   ; Diastolic (89SRR), WTW:94, Min:45, Max:64    I/O (24Hr):   No intake or output data in the 24 hours ending 22 0815        Labs/Imaging/Diagnostics    Labs:  CBC:Recent Labs     228   WBC 7.3 5.7   RBC 2.83* 3.03*   HGB 9.1* 9.7*   HCT 26.5* 28.4*   MCV 93.6 93.9   RDW 15.1* 15.2*   PLT 92* 80*     CHEMISTRIES:  Recent Labs     2230 22  0328    138   K 4.0 4.8   CL 95 96   CO2 30 30   BUN 15 27*   CREATININE 4.37* 5.48*   GLUCOSE 226* 151*   MG 1.8  --      PT/INR:  Recent Labs     22   PROTIME 16.3*   INR 1.3     APTT:  Recent Labs     22   APTT 30.3     LIVER PROFILE:  Recent Labs     22  0328   AST 40* 28   ALT 25 23   BILITOT 1.4* 1.3*   ALKPHOS 132* 126       Imaging Last 24 Hours:  CT ABDOMEN PELVIS WO CONTRAST Additional Contrast? None    Result Date: 2022  CT ABDOMEN PELVIS WO CONTRAST: 2022 CLINICAL HISTORY:  severe low back and right flank pain after fall; assess for retroperitoneal hematoma . COMPARISON: 4/20/2022. TECHNIQUE: Spiral images were obtained of the abdomen and pelvis without contrast. All CT scans at this facility use dose modulation, iterative reconstruction, and/or weight based dosing when appropriate to reduce radiation dose to as low as reasonably achievable. FINDINGS: Minimally displaced acute fractures of the posterior right 10th and 11th ribs are noted. A small right pleural effusion has mildly decreased in size from the prior study. There is no organized hematoma, other displaced fractures, evidence of solid organ injury (within the limits of a noncontrast study), or other significant changes from 4/20/2022 identified. A small to moderate-sized left pleural effusion, mild probable scarring and/or atelectasis of the visualized lung bases, a moderate compression fracture of T11, and other previously described chronic findings appear unchanged. MINIMALLY DISPLACED ACUTE FRACTURES OF THE POSTERIOR RIGHT 10TH AND 11TH RIBS. NO OTHER SIGNIFICANT RECENT POSTTRAUMATIC COMPLICATION IDENTIFIED. CHRONIC FINDINGS, NOT SIGNIFICANTLY CHANGED FROM 4/20/2022. XR CHEST (2 VW)    Result Date: 6/30/2022  EXAMINATION:  CHEST. CLINICAL HISTORY:  CONFUSION. COMPARISONS:  6/2/2022. TECHNIQUE:  AP and lateral. FINDINGS: There is mild to moderate cardiomegaly. No definite evidence of pulmonary venous congestion. Chronic changes are noted in the left lower thorax. There is a dialysis catheter in place. There is a valve prosthesis in place. There are small pleural effusions or blunting of both costophrenic angles. NO SIGNIFICANT CHANGE SINCE THE PREVIOUS EXAM. THERE HAS BEEN PLACEMENT OF A DIALYSIS CATHETER SINCE THAT EXAM.     CT HEAD WO CONTRAST    Result Date: 6/30/2022  CT HEAD WO CONTRAST : 6/30/2022 CLINICAL HISTORY:  fall from standing last night confused at dialysis c/o back pain . COMPARISON: 10/11/2021.  TECHNIQUE: Spiral unenhanced images were obtained of the head, with routine multiplanar reconstructions performed. All CT scans at this facility use dose modulation, iterative reconstruction, and/or weight based dosing when appropriate to reduce radiation dose to as low as reasonably achievable. FINDINGS: There is no intracranial hemorrhage, mass effect, midline shift, extra-axial collection, evidence of hydrocephalus, recent ischemic infarct, or skull fracture identified. Chronic volume loss and mild mucosal thickening is again noted within the maxillary sinuses. The mastoid air cells and other visualized paranasal sinuses are essentially clear. NO ACUTE INTRACRANIAL PROCESS OR SIGNIFICANT CHANGE FROM 10/11/2021 IDENTIFIED. CT CHEST WO CONTRAST    Result Date: 6/30/2022  CT CHEST WO CONTRAST: 6/30/2022 CLINICAL HISTORY:  fall; rib fractures . COMPARISON: CT abdomen pelvis from earlier 6/30/2022, and chest CTA 1/12/2022. TECHNIQUE: Spiral imaging was obtained of the chest without contrast. All CT scans at this facility use dose modulation, iterative reconstruction, and/or weight based dosing when appropriate to reduce radiation dose to as low as reasonably achievable. FINDINGS: Minimally displaced acute fractures of the posterior right 10th and 11th ribs appear similar to the recent abdomen CT. Mild right lateral rib deformities of the mid levels appears substantially similar to 1/12/2022, given the motion artifact on the previous exam. There is no organized hematoma, significant pulmonary contusion, or other acute posttraumatic complication identified. Since the prior study, a left internal jugular approach hemodialysis catheter has been placed with its tip within the SVC, and postoperative changes from previous mitral valve replacement are otherwise unremarkable. The heart remains mildly enlarged with  a very small pericardial effusion.  A small to moderate-sized left pleural effusion, small right pleural effusion, and mild probable atelectasis or scarring of the mid to lower lung fields is noted. MINIMALLY DISPLACED ACUTE RIGHT 10TH AND 11TH RIB FRACTURES. NO OTHER ACUTE FRACTURE OR SIGNIFICANT POSTTRAUMATIC COMPLICATION IDENTIFIED. CT CERVICAL SPINE WO CONTRAST    Result Date: 6/30/2022  EXAMINATION:  CT CERVICAL SPINE. CLINICAL HISTORY:  TRAUMA. COMPARISONS:  10/11/2021. TECHNIQUE:  Serial 2.5 mm scans. No contrast. FINDINGS:  There is some reversal of the normal lordotic curve. There is slight scoliosis. There is significant degenerative arthritis with narrowing of all the interspaces. No fracture is seen. No prevertebral soft tissue swelling. SIGNIFICANT DEGENERATIVE ARTHRITIS. NO FRACTURE IS SEEN. CT THORACIC SPINE WO CONTRAST    Result Date: 6/30/2022  EXAMINATION:  CT THORACIC SPINE. CLINICAL HISTORY:  TRAUMA. COMPARISONS:  NONE AVAILABLE TECHNIQUE:   Serial 2.5 mm scans. No contrast.  FINDINGS:  There is mild thoracic kyphoscoliosis. There is a compression fracture of T11 with some anterior wedging. Radiographic lead this appears to be old rather than acute. No other fracture is seen. There is mild to moderate degenerative arthritis. We note a small pleural effusion on the right and a moderate sized effusion on the left. OLD COMPRESSION FRACTURE OF T11. AN ACUTE FRACTURE IS NOT SEEN. BILATERAL PLEURAL EFFUSIONS. CT LUMBAR SPINE WO CONTRAST    Result Date: 6/30/2022  EXAMINATION:  CT LUMBAR SPINE. CLINICAL HISTORY:  TRAUMA. COMPARISONS:  NONE AVAILABLE TECHNIQUE:  Serial 2.5 mm scans. No contrast. FINDINGS:  Vertebral bodies are in fairly normal alignment. Interspaces are fairly well maintained. There is mild degenerative arthritis. No fracture is seen. MILD DEGENERATIVE ARTHRITIS. NO FRACTURE. XR CHEST PORTABLE    Result Date: 6/30/2022  COMPARISON: No to 30 of 2022. HISTORY: SOB TECHNIQUE: AP view FINDINGS: No consolidating pneumonia or pneumothorax is seen. The costophrenic angles again appear blunted. The cardiac silhouette is enlarged. There is again evidence of median sternotomy and valve replacement. A central venous catheter is again seen in place and  unchanged in position. . There are appear to be healed rib fractures in the left hemithorax. Degenerative changes are seen in the spine and visualized right shoulder. Findings may represent mild CHF. It is not significantly change from previous study. Assessment//Plan           Hospital Problems           Last Modified POA    Impaired mobility and activities of daily living 6/30/2022 Yes    Closed T11 fracture ([address] 6/30/2022 Yes    Encephalopathy acute 6/30/2022 Yes    Chronic diastolic congestive heart failure (HCC) (Chronic) 6/30/2022 Yes    Closed rib fracture 6/30/2022 Yes    Dialysis patient ([address] 6/30/2022 Yes    Overview Signed 6/30/2022 11:26 AM by Jose Myers DO     Patient has hemodialysis Tuesday Thursday and Saturday. Multiple closed fractures of right lower extremity and ribs 6/30/2022 Yes    Overview Signed 6/30/2022 12:09 PM by Jose Myers DO     MINIMALLY DISPLACED ACUTE FRACTURES OF THE POSTERIOR RIGHT 10TH AND 11TH RIBS.              Assessment & Plan    Active problems:  · Weakness, fall -with history of multiple prior falls  · Right posterior 10th and 11th rib fractures, minimally displaced, with associated back pain  · Confusion/AMS in setting of above     Chronic problems:  · ESRD on HD (most recent treatment not completed)  · DM2 with neuropathy and nephropathy (50 units Lantus nightly and high correction sliding scale plus supplemental short acting with meals at home)  · Obesity  · Chronic anemia  · Hx seizures  · Hx CVA with baseline left-sided weakness  · Reported Hx tardive dyskinesia  · [place]  · Pulmonary hypertension  · Obesity  · Paranoid schizophrenia  · Moderate persistent asthma  · CAD s/p stenting and CABG  · Chronically elevated troponin, at baseline level  · Chronically elevated INR, baseline level        Plan:  · Place in observation status  · Nephrology consulted for hemodialysis management  · Acute inpatient rehabilitation consult for inpatient rehab appropriateness  · PT/OT consult for evaluation and treatment  · Fall precautions  · N.p.o. except sips/chips and selected meds, s/p choking event with first post-admission meal, pending SLP swallowing evaluation and modified barium swallow test  · When p.o. intake resumed: Adult cardiac diabetic renal diet (plus any modifications per ST recommendations)  · Lantus 35 units nightly, with medium sliding scale every 4 hours while NPO, transition to [facility] 3 times daily and nightly when p.o. intake resumed. · Heparin TID for DVT prophylaxis  · Continue/hold home medications as ordered after medication history completed. 7/1: 2 of 2 BCx newly demonstrating MRSA bacteremia. Urgently started on loading dose vancomycin, dosing per pharmacy. Nephrology notified. Hemodialysis abbreviated, patient received vancomycin loading dose during third hour of hemodialysis. Infectious disease consult placed. (Patient away from room during multiple trips to the floor. Will see next day.   Case actively managed throughout the day, including review of VS and new results, discussions with consultants assisting with management of the case, and new orders as appropriate.)    Electronically signed by Isai Lucero DO on 7/1/2022 at 5:31 PM

## 2022-07-01 NOTE — FLOWSHEET NOTE
7/1/22 @ [address] [place] [address] [address] NOTIFIED DR. Westley Rodriguez (Billy Austin) OF IR CONSULT # [phone]

## 2022-07-01 NOTE — PROGRESS NOTES
HD today for 3 hours via RAVG. Tolerated tx well. Received Vancomycin 2 gm during last hour of tx. Net UF 2500 ml. Homeostasis of RAVG achieved and dressing dry and intact. All HD charting via [employer] paper chart. Hands off to [employer].

## 2022-07-01 NOTE — CONSULTS
Renal consult dictated  Dialysis today  Pain management lumbar d/t fall  MRSA  Remove dialysis catheter  Order for Dr Kody De La Rosa placed Start vancomycin on dialysis

## 2022-07-01 NOTE — PROGRESS NOTES
Physical Therapy Missed Treatment   Facility/Department: [facility] MED SURG O438/[id number]    NAME: Sarah Pickering    : 1960 (89 y.o.)  MRN: [id number]    Account: [de-identified]  Gender: female      PT evaluation and treatment orders received. Chart reviewed. Pt evaluation attempted. Pt off floor for HD. Nursing staff aware. Update: Hold this date per MD Annmarie Banuelos    Will follow and attempt PT evaluation again at earliest availability.        Milli Leal, PT, 22 at 9:34 AM

## 2022-07-01 NOTE — CARE COORDINATION
[facility] Pre-Admission Screening Document      Patient Name: Herlinda Celaya       MRN: [id number]    : 1960    Age: 57 y.o. Gender: female   Payor: Payor: MEDICARE / Plan: MEDICARE PART A AND B / Product Type: *No Product type* /   MSSP: Yes    Admitted from: [facility] Floor: ED/2W  Attending Care Provider: Caden Hoyos MD  Inpatient Rehab Referring Care Provider: Naa Noriega DO  Primary Care Provider: Concepcion Castro MD  Inpatient Treatment Team including Consults: Treatment Team: Attending Provider: Caden Hoyos MD; Consulting Physician: Josy Flor MD; Consulting Physician: Derek Gregory DO; Consulting Physician: Ravi Landon MD; Consulting Physician: Noemi Aguirre MD; Consulting Physician: Alonso Mayo MD; Utilization Reviewer: Alfred Vega RN; Utilization Reviewer: Justo Villafana RN; Consulting Physician: Dave Schmitz MD; Patient Care Tech: Esmer Smith; : Radha Hull RN; Patient Care Tech: Kaitlynn Somers; Registered Nurse: Manuel Mendoza RN    Reason for Hospitalization:   1. Closed fracture of multiple ribs of right side, initial encounter    2. Concussion without loss of consciousness, initial encounter    3. Fall from standing, initial encounter    4. Chronic renal failure, stage 5 (HCC)    5. Bilateral pleural effusion    6. At high risk for injury related to fall    7. Physical deconditioning    8. Class 1 obesity due to excess calories with serious comorbidity and body mass index (BMI) of 33.0 to 33.9 in adult      Chief Complaint   Patient presents with    Back Pain     since fall last night from standing     Isolation:No active isolations    Hospital Course:  Admit Date: 2022  7:01 AM  Inpatient Rehab Referral Date: 22  Narrative of hospital course/history of present illness: 57 yr old female, ESRD with HD //Sat, presented to ED s/p fall from standing.  Patient states she simply misstepped and fell straight backwards hitting the back of her head. No loss of consciousness. ED workup revealed minimally displaced acute Right 10th & 11th rib fractures, old compression fracture T11, Bilateral pleural effusions. ID: Sepsis with Enterococcus, Infected dialysis cath removed. Continue vancomycin x 3 weeks with HD. Neuro: Ataxia, bilateral lower extremity weakness and falls, peripheral neuropathy.    Neurosurgery:    OR 7/1 with Dr Steff Storm: Removal of tunneled left IJ HD CVC  OR 7/7 with Dr Steff Storm: Left IJ Tunneled HD Permcath insertion          Medical & Surgical History/Current Comorbidities:  Past Medical History:   Diagnosis Date    Angina at rest [facility]) 5/24/2020    Anxiety     CAD S/P percutaneous coronary angioplasty 2015, 2018    stents per dr Michelle Reed    CHF (congestive heart failure) ([address]     CKD (chronic kidney disease) stage 4, GFR 15-29 ml/min ([address] 2/24/2018    CKD stage 4 due to type 2 diabetes mellitus ([address]     Contusion of right chest wall 2/16/2021    COPD (chronic obstructive pulmonary disease) ([address]     Diabetic nephropathy with proteinuria ([address] 2014    DJD (degenerative joint disease) of knee     Dr Uzma Bella GERD (gastroesophageal reflux disease)     Hemiparesis, left ([address] 2013    entered Assisted Living ([facility])    Hemodialysis patient [facility])     Hemodialysis-associated hypotension 10/22/2021    History of heart failure     History of seizures     History of type C viral hepatitis     HTN (hypertension)     Hyperlipidemia     Impaired mobility and activities of daily living     Mediastinal lymphadenopathy 2013    Jack Solorzano    Metabolic syndrome     Moderate persistent asthma without complication 1/47/1653    Need for extended care facility 7/7/2021    Neurogenic urinary incontinence 2013    Neuropathy in diabetes [facility])     Nonrheumatic mitral valve regurgitation 7/7/2021    Nonrheumatic tricuspid valve regurgitation 2021    Obesity (BMI 30-39. 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 Jenna Olson      x1 Dr. Leila Orosco, Dr Brook Garcia 2018    Bebo Crowley  08/10/2021    [facility]    DIAGNOSTIC CARDIAC CATH LAB PROCEDURE  10/02/2019    DIALYSIS CATHETER INSERTION Left 2022    Tunneled Symetrex 15.5F x 23cm hemodialysis catheter inserted by Dr. Noreen Drummond, TOTAL ABDOMINAL (CERVIX REMOVED)      one ovary intact, Dr Mariella Lawson, menorrhagia    IR THROMBECTOMY PERCUT AVF  2022    IR THROMBECTOMY PERCUT AVF 2022 [facility] SPECIAL PROCEDURE    IR TUNNELED CATHETER PLACEMENT GREATER THAN 5 YEARS  2022    IR TUNNELED CATHETER PLACEMENT GREATER THAN 5 YEARS 6/3/2022 [facility] SPECIAL PROCEDURE    IR TUNNELED CATHETER PLACEMENT GREATER THAN 5 YEARS Left 2022    15.5 fr by 23 cm Symetrex dialysis catheter inserted by Dr Joseph Abts Left 2018    LEFT KNEE TOTAL KNEE ARTHROPLASTY, SHAYNA, NERVE BLOCK performed by Vilinda Duverney, [facility] at [facility]      TOE AMPUTATION Right     TOTAL KNEE ARTHROPLASTY  2016    Dr Chandan Francisco TUNNELED 1 [facility] Right 2020 tunneled HD catheter per Dr Abdon Hernández:  Paulina Choudhury of  Living Will ACP-Advance Directive ACP-Power of Vee Keller on 10/09/19 Not on File Not on File Filed          Labs/Infection Control:  Recent Labs     07/06/22  0557 07/06/22  0558 07/07/22  0547 07/08/22  0632   WBC  --  6.5 9.0 8.4   HGB  --  10.5* 9.6* 9.4*   HCT  --  29.8* 26.6* 26.6*   PLT  --  196 235 255   BUN 15  --  24* 30*   CREATININE 3.03*  --  3.70* 4.05*   GLUCOSE 86  --  66* 82     --  134* 133*   K 4.3  --  4.4 5.1*   CALCIUM 9.5  --  9.6 10.2*      Blood cultures:  No results for input(s): BC in the last 72 hours. Urinalysis/C&S:  No results for input(s): NITRITE, LABCAST, WBCUA, RBCUA, MUCUS, TRICHOMONAS, YEAST, BACTERIA, LEUKOCYTESUR, BLOODU, GLUCOSEU, KETUA, AMORPHOUS, LABURIN in the last 72 hours. Radiology:  MRI CERVICAL SPINE WO CONTRAST   Result Date: 7/4/2022  MODERATE TO MARKED CENTRAL SPINAL STENOSIS AND MODERATE NEURAL FORAMINAL NARROWING FROM C4-5 THROUGH C6-C7 LEVELS. MODERATE CENTRAL SPINAL STENOSIS AND MILD NEURAL FORAMINAL NARROWING AT C3-4. MILD TO MODERATE LEFT NEURAL FORAMINAL NARROWING C7-T1 AND T1-2. MRI BRAIN WO CONTRAST   Result Date: 7/4/2022  No acute ischemia or acute intracranial process. Generalized parenchymal volume loss and nonspecific white matter findings most compatible with chronic small vessel ischemic changes in a patient of this age. MRI LUMBAR SPINE WO CONTRAST   Result Date: 7/4/2022  Degenerative changes of the lumbar spine as detailed. Localizer images demonstrate cervical spine degenerative changes with apparent multilevel high-grade spinal canal stenosis. CT ABDOMEN PELVIS WO CONTRAST Additional Contrast? None  Result Date: 6/30/2022  MINIMALLY DISPLACED ACUTE FRACTURES OF THE POSTERIOR RIGHT 10TH AND 11TH RIBS. NO OTHER SIGNIFICANT RECENT POSTTRAUMATIC COMPLICATION IDENTIFIED.  CHRONIC FINDINGS, NOT SIGNIFICANTLY CHANGED FROM 4/20/2022. XR CHEST (2 VW)  Result Date: 6/30/2022  NO SIGNIFICANT CHANGE SINCE THE PREVIOUS EXAM. THERE HAS BEEN PLACEMENT OF A DIALYSIS CATHETER SINCE THAT EXAM.     CT HEAD WO CONTRAST  Result Date: 6/30/2022  NO ACUTE INTRACRANIAL PROCESS OR SIGNIFICANT CHANGE FROM 10/11/2021 IDENTIFIED. CT CHEST WO CONTRAST  Result Date: 6/30/2022  MINIMALLY DISPLACED ACUTE RIGHT 10TH AND 11TH RIB FRACTURES. NO OTHER ACUTE FRACTURE OR SIGNIFICANT POSTTRAUMATIC COMPLICATION IDENTIFIED. CT CERVICAL SPINE WO CONTRAST  Result Date: 6/30/2022  SIGNIFICANT DEGENERATIVE ARTHRITIS. NO FRACTURE IS SEEN. CT THORACIC SPINE WO CONTRAST  Result Date: 6/30/2022  OLD COMPRESSION FRACTURE OF T11. AN ACUTE FRACTURE IS NOT SEEN. BILATERAL PLEURAL EFFUSIONS. CT LUMBAR SPINE WO CONTRAST  Result Date: 6/30/2022  MILD DEGENERATIVE ARTHRITIS. NO FRACTURE. XR CHEST PORTABLE  Result Date: 6/30/2022  Findings may represent mild CHF. It is not significantly change from previous study. Medications/IV's:  The patient is currently on heparin and aspirin for DVT prophylaxis.      Scheduled:    vancomycin, 1,250 mg, IntraVENous, Once    lidocaine, 3 patch, TransDERmal, Daily    polyethylene glycol, 17 g, Oral, Daily    insulin lispro, 0-12 Units, SubCUTAneous, TID WC    insulin lispro, 0-6 Units, SubCUTAneous, Nightly    heparin (porcine), 2,000 Units, IntraVENous, Once    epoetin chadwick-epbx, 10,000 Units, SubCUTAneous, Once    vancomycin (VANCOCIN) intermittent dosing (placeholder), , Other, RX Placeholder    ARIPiprazole, 5 mg, Oral, Daily    aspirin EC, 81 mg, Oral, Daily    atorvastatin, 40 mg, Oral, Nightly    insulin glargine, 35 Units, SubCUTAneous, Nightly    isosorbide mononitrate, 120 mg, Oral, Daily    magnesium oxide, 400 mg, Oral, Daily    melatonin, 10 mg, Oral, Nightly    midodrine, 5 mg, Oral, Once per day on Mon Wed Fri    miconazole, , Topical, BID    sertraline, 50 mg, Oral, Nightly    PRN:  camphor-menthol-methyl salicylate, melatonin, acetaminophen **OR** acetaminophen, prochlorperazine, albuterol, hydrOXYzine HCl, glucose, dextrose bolus **OR** dextrose bolus, glucagon (rDNA), dextrose    Allergies: Allergies   Allergen Reactions    Codeine Hives     hives    Oxycontin [Oxycodone Hcl] Hives         Most Recent Vitals, Height and Weight  BP (!) 152/66   Pulse 77   Temp 97.9 °F (36.6 °C)   Resp 18   Ht 5' 7\" (1.702 m)   Wt 195 lb 12.3 oz (88.8 kg)   LMP  (LMP Unknown)   SpO2 97%   BMI 30.66 kg/m²     Weight Bearing Restrictions: wbat       Current Diet Order: ADULT DIET;  Dysphagia - Minced and Moist; 3 carb choices (45 gm/meal)    Skin: redness/excoriated abd folds  Wound Care Documentation: pharmaceutical-abd panus        Lungs:   Respiratory  Respiratory Pattern: Regular  Respiratory Depth: Normal  Respiratory Quality/Effort: Unlabored  Chest Assessment: Chest expansion symmetrical  L Breath Sounds: Diminished  R Breath Sounds: Diminished  Level of Activity/Mobility: Up with assist  Subcutaneous Air/Crepitus: None      Cognition and Behavior:  Language Preference (if other than English):      Alertness/Behavior  Neuro (WDL): Within Defined Limits  Level of Consciousness: Alert (0)  History of Falling: Yes      Short Term Memory Deficits  Patient's memory adequate to safely complete daily activities?: Yes  History of Falling: Yes    Safety  Patient's Judgement Adequate to Safely Complete Daily Activities: Yes  Patient Able to Express Needs/Desires: Yes       Prior Level of Function and Living Arrangements:  Social/Functional History  Lives With: Daughter  Type of Home: House  Home Layout: Two level,Able to Live on Main level with bedroom/bathroom  Home Access: Ramped entrance  Bathroom Shower/Tub:  (washes up at sink)  Bathroom Toilet: Standard  Home Equipment: paty Steiner,Hospital bed  Has the patient had two or more falls in the past year or any fall with injury in the past year?: Yes  Receives Help From: Family (dtr is official HHA)  ADL Assistance: Needs assistance (assist with socks/brief/pans over feet, bra. Assist for hygiene after BM and with bathing)  Grooming: Independent  Feeding: Independent  Homemaking Assistance: Needs assistance  Meal Prep: Moderate  Laundry: Moderate  Vacuuming: Maximal  Cleaning: Maximal  Gardening: Unable to assess (comment)  Driving: Unable to assess (comment)  Shopping: Maximal  Homemaking Responsibilities: No  Ambulation Assistance: Independent (rollator)  Active : No (daughter and public transportation)  Education: 12th grade  Occupation: On disability  Type of Occupation: nurses aide prior  [address]: none Social/Functional History  Dental Appliances: None (no teeth)  Vision - Corrective Lenses: Eyeglasses (not here)  Hearing Aid: None  Personal Equipment:   Dental Appliances: None (no teeth)  Vision - Corrective Lenses: Eyeglasses (not here)  Hearing Aid: None      CURRENT FUNCTIONAL LEVEL:  Physical Therapy  Bed mobility:   Bed mobility  Supine to Sit: Stand by assistance (07/07/22 1422)  Sit to Supine: Stand by assistance (07/07/22 1422)  Scooting: Stand by assistance (07/07/22 1422)  Bed Mobility Comments: Requires increased time and effort to complete. Good sequencing. Utilizes bed rail.  (07/07/22 1422)     Transfers:  Transfers  Sit to Stand: Stand by assistance (07/07/22 1423)  Stand to sit: Stand by assistance (07/07/22 1423)  Bed to Chair: Contact guard assistance (07/02/22 1224)  Comment: Requires increased time to stabilize self upon standing (07/07/22 1423)     Gait:   Ambulation  Surface: level tile (07/07/22 1424)  Device: Rollator (07/07/22 1424)  Assistance: Minimal assistance (07/07/22 1424)  Quality of Gait: gait pattern varies- step through to reciprocal gait, antalgia, decreased management and control of rollator, min A to stabilize (07/07/22 1424)  Gait Deviations: Slow Tere (07/06/22 1021)  Distance: 25 feet (07/07/22 1424)  Comments: pt fatigued with this distance declines to ambulate further. (07/06/22 1021)      Stairs:  Stairs/Curb  Stairs?: No (07/07/22 1424)  W/C mobility: NT         Occupational Therapy     ADL  Feeding: Setup (07/03/22 1211)  Grooming: Minimal assistance (07/03/22 1211)  UE Dressing: Minimal assistance (07/03/22 1211)  LE Dressing: Minimal assistance (07/06/22 1121)  LE Dressing Skilled Clinical Factors: pants and socks (07/06/22 1121)  Toileting: Dependent/Total (07/03/22 1211)  Toileting Skilled Clinical Factors: reports significant difficulty with hygiene/clothing management (07/03/22 1211)  Additional Comments: Simulated ADLs. Pt complaining of fatigue with activity. Pt reports unable to assist with clothing over feet. Pt on purewick upon entering room but asked OT to remove it due to discomfort (07/03/22 1211)             Speech Language Pathology   Diet/Swallow:  7/1/2022 MBS  Mild-moderate oral dysphagia characterized by impaired bolus control/cohesion and impaired mastication with premature entry to the pyriforms. Pt had incomplete chewing and swallowed peaches whole. Mild-moderate oral residue cleared independently with re-swallow. Mild pharyngeal dysphagia characterized by flash penetration of soft and thin during the swallow which immediately cleared. Mild-moderate residue in valleculae cleared with re-swallow  Dysphagia Outcome Severity Scale: Level 4: Mild moderate dysphagia- Intermittent supervision/cueing.  One - two diet consistencies restricted  Compensatory Swallowing Strategies : Eat/Feed slowly,Upright as possible for all oral intake,Small bites/sips  Therapeutic Interventions: Patient/Family education,Oral motor exercises,Diet tolerance monitoring      Current Conditions Requiring Inpatient Rehabilitation  Bowel/Bladder Dysfunction: Yes  Intervention Required = Frequent toileting, incontinence care  Risk for Medical/Clinical Complications = high  Skin Healing/Breakdown Risk: Yes  Intervention Required = Side to side turns, Monitor for S/S of infection, Wound Care RN and excoriation ab folds  Risk for Medical/Clinical Complications = high  Nutrition/Hydration Deficiency: Yes  Intervention Required = Monitor I&Os, Check Labs and Dietary Eval  Risk for Medical/Clinical Complications = moderate  Medical Comorbidities: Yes  Intervention Required = DVT risk, CAD, Renal disease and COPD, Diabetic neuropathy, DJD of knee, Hepatitis C, paranoid schizophrenia, Dialysis for ESRD  Risk for Medical/Clinical Complications = high    Rehab/Skilled Needs:   900 minutes of Intensive Acute Rehab therapy over 7 days/week  PT Treatment Time:  1.5 hrs/day  OT Treatment Time: 1.5 hrs/day  SLP Treatment Time: .5 hrs/day  Rehabilitation Nursing   Case management/Social work  Hemodialysis    Cultural needs:   Values / Beliefs  Do You Have Any Ethnic, Cultural, Sacramental, or Spiritual Evangelical Needs You Would Like Us To Be Aware of While You Are in the Hospital : No   Funding needs:   none    Expected Level of Improvement with Rehab  Assist for ADL Supervision / Standby Assist  Assist for Transfers [place]  Assist for Gait Modified [place]    Patient's willingness to participate: Yes  Patient's ability to tolerate proposed care: Yes  Patient/Family Goals of Rehab (in patient's/family's own words):  \"Go home when I am stronger\"    Anticipated Discharge Plan:  Home with   Home Health, RN PT OT SLP Aide Social Work to be determined      Barriers to Discharge:  Multi-level home  Caregiver availability  [address] transportation  Resource availability      Rehab evaluation plan: Recommend Acute Rehab  Rehabilitation Impairment Group Code: [id number]  Rehab Impairment Group: Medical deconditioning: septicemia  Estimated Length of Stay (days): 13  Rehab Diagnosis: Impaired mobility and ADL's due to MRSA septicemia  Reviewer's Signature: Electronically signed by Haleigh Muñoz RN on 7/9/22

## 2022-07-01 NOTE — PROGRESS NOTES
[facility] Occupational Therapy      Date: 2022  Patient Name: Breanna Coleman        MRN: [id number]  Account: [de-identified]   : 1960  (57 y.o.)  Room: [place] [address]    Chart reviewed, attempted OT at [id number] for eval. Patient not seen 2° to: Other: hold per physician request d/t increased medical needs this date. Dr. Evelyn Alonzo requests hold until . Will attempt again when able.     Electronically signed by CATHY Pressley on 2022 at 10:22 AM

## 2022-07-01 NOTE — CONSULTS
[address]                      [facility], [address] [facility]                                  CONSULTATION    PATIENT NAME: Fran Ortega                  :        1960  MED REC NO:   [id number]                            ROOM:       [id number]  ACCOUNT NO:   [de-identified]                           ADMIT DATE: 2022  PROVIDER:     Estrella Correa DO    CONSULT DATE:  2022    RENAL CONSULTATION    HISTORY OF PRESENT ILLNESS:  A 68-year-old  female into the  hospital after feeling faint at the dialysis unit. Prior to coming to  the dialysis in which she only receives 1 hour, she had fallen and  struck her back. The patient also had pain in her ribs. Because of her  overall weakness, the patient was brought to the hospital by squad. She  is in no distress at this time. Patient tested positive MRSA ? blood no chills afebrile  Chest x-ray was consistent with  pulmonary edema. She has had a mitral valve surgery in the past in  addition to coronary angioplasty. The patient has a previous CVA with  left hemipareses. She is diabetic, has known COPD and known anxiety  disorder. On admission to the hospital, the patient was negative for  COVID. She has a history of hepatitis C, hypertension, hyperlipidemia. Known CABGx and tricusid repair    PAST MEDICAL HISTORY:  End-stage renal disease, COPD, organic heart  disease, mitral valve disease, coronary angioplasty, hepatitis C,  hypertension, hyperlipidemia, mediastinal lymphadenopathy noted, anemia,  schizophrenia with paranoia, diabetes mellitus type 2. PAST SURGICAL HISTORY:  ; recent dialysis catheter, left chest  wall; hysterectomy; left total knee replacement; right first toe  amputation. SOCIAL HISTORY:  , two children. No evidence of smoking or  alcohol abuse.     MEDICATIONS:  At the time of her admission; Abilify, Nitrostat, Lasix,  insulin coverage, Imdur, Klor-Con, ProAmatine, Zoloft, Emla cream,  _____, Lantus, Atarax, Proventil, Lopressor, nitroglycerin. ALLERGIES TO MEDICATIONS:  CODEINE and OXYCONTIN. PHYSICAL EXAMINATION:  VITAL SIGNS:  5 feet and 7 inches, 200 pounds. Blood pressure is  110/60, heart rate 70, respirations 16, temperature 99.5. HEENT:  Normocephalic. Pupils equal and reactive to light. NECK:  Supple. No JVD or adenopathy. CHEST:  Lungs are relatively clear. Few rhonchi at the bases. No  wheezing or accessory muscle use. CARDIOVASCULAR:  Heart is regular, 1/6 systolic murmur. ABDOMEN:  Overweight. Bowel sounds are present. No guarding or  rigidity. No distention. Chest wall shows a left dialysis catheter in  place. EXTREMITIES:  Show 1-2+ edema of the lower limbs. The patient moves all  limbs. Fistula noted in the right arm. SKIN:  Warm and dry. IMPRESSION:  1. End-stage renal disease on hemodialysis support Tuesday, Thursday,  Saturday. 2.  Organic heart disease, pulmonary edema. 3.  Diabetes mellitus. 4.  Status post tricuspid valve replacement. 5.  Schizophrenia. 6.  Anemia. 7.  Diabetes mellitus type 2.  8.  History of mediastinal lymphadenopathy. 9.  Known coronary artery disease, previous stenting. 10 MRSA  Dialysis catheter in place  PLAN:  We will do dialysis today in light of fact that she had minimal  treatment yesterday. Pain management of her back. Follow blood  pressure and blood sugars. Dialysis today with Procrit order to be  Given.  Needs dialysi catheter removed today with vancomycin IV Dr Ernst Barba consulted   Nurse on floor to contact him also        Apolonia Champagne DO    D: 07/01/2022 8:55:05       T: 07/01/2022 8:59:22     [id number]  Job#: [id number]     Doc#: [id number]    CC:

## 2022-07-01 NOTE — FLOWSHEET NOTE
Patient assessment completed. Pt went to dialysis this am. No meds given d/t leaving floor. Blood cx +. Vanco ordered. Dialysis to start. Dialysis called vanco given after dialysis treatment. 2500 taken off.  VSS WNL

## 2022-07-01 NOTE — PROGRESS NOTES
Comprehensive Nutrition Assessment    Type and Reason for Visit:  Initial,Positive Nutrition Screen    Nutrition Recommendations/Plan:   1. Recommend gaining EN access via PEG and initiate nutrition support. 2. Once EN access is obtained, start renal formula (Nepro) @20ml/hr and monitor for ability to reach goal rate @35ml/hr     Malnutrition Assessment:  Malnutrition Status:  Severe malnutrition (07/01/22 1105)    Context:  Social/Environmental Circumstances     Findings of the 6 clinical characteristics of malnutrition:  Energy Intake:  50% or less estimated energy requirements for 1 month or longer  Weight Loss:  Greater than 10% over 6 months     Body Fat Loss:  No significant body fat loss     Muscle Mass Loss:  No significant muscle mass loss    Fluid Accumulation:  Unable to assess     Strength:  Not Performed    Nutrition Assessment:    Pt meets criteria for severe malnutrition evidenced by poor PO intake d/t dysphagia and significant wt loss. Pt now with inability to take PO per SLP. Recommend gaining EN access via PEG and initiate nutrition support. Nutrition Related Findings:    Pmhx: ESRD with HD T/Th/Sat, CHF, CAD, COPD, DM, GERD, toe amputation. presented to ED s/p fall from standing dx rib fractures. BSE 6/30=NPO. MBS pending. Reports poor intake pta d/t coughing with PO intake. Labs (7/1): BUN/Cr=27-5.48; Gluc=151-226. No edema noted. Wound Type: None       Current Nutrition Intake & Therapies:    Diet NPO    Anthropometric Measures:  Height: 5' 7\" (170.2 cm)  Ideal Body Weight (IBW): 135 lbs (61 kg)    Admission Body Weight: 217 lb (98.4 kg) (stated 6/30)  Current Body Weight: 200 lb (90.7 kg) (7/1),   Weight Source: Bed Scale  Current BMI (kg/m2): 31.3  Usual Body Weight: 230 lb (104.3 kg) (bed 1/13/22; 218 lb 14 oz 6/2/22)  % Weight Change (Calculated): -13                    BMI Categories: Obese Class 1 (BMI 30.0-34. 9)    Estimated Daily Nutrient Needs:  Energy Requirements Based On: Kcal/kg  Weight Used for Energy Requirements: Current  Energy (kcal/day): 1365-1550kcal (15-17kcal/kg)  Weight Used for Protein Requirements: Ideal  Protein (g/day): 73-79g (1.2-1.3g/kg)  Method Used for Fluid Requirements: Standard Renal    Nutrition Diagnosis:   · Inadequate oral intake related to swallowing difficulty as evidenced by poor intake prior to admission    · Severe malnutrition,In context of social or environmental circumstances related to inadequate protein-energy intake as evidenced by poor intake prior to admission,NPO or clear liquid status due to medical condition,weight loss greater than or equal to 10% in 6 months      Nutrition Interventions:   Food and/or Nutrient Delivery: Continue NPO  Nutrition Education/Counseling: No recommendation at this time  Coordination of Nutrition Care: Continue to monitor while inpatient       Goals:     Goals: gain EN access and initiate nutrition support      Nutrition Monitoring and Evaluation:   Behavioral-Environmental Outcomes: None Identified  Food/Nutrient Intake Outcomes: Diet Advancement/Tolerance  Physical Signs/Symptoms Outcomes: Biochemical Data,Weight,Chewing or Swallowing    Discharge Planning:     Too soon to determine     Theresa Oscar, MS, RD, LD

## 2022-07-01 NOTE — PLAN OF CARE
Nutrition Problem #1: Inadequate oral intake  Intervention: Food and/or Nutrient Delivery: Continue NPO  Nutritional Goals: gain EN access and initiate nutrition support

## 2022-07-02 LAB
ALBUMIN SERPL-MCNC: 3.5 G/DL (ref 3.5–4.6)
ALP BLD-CCNC: 124 U/L (ref 40–130)
ALT SERPL-CCNC: 19 U/L (ref 0–33)
ANION GAP SERPL CALCULATED.3IONS-SCNC: 12 MEQ/L (ref 9–15)
AST SERPL-CCNC: 20 U/L (ref 0–35)
BASOPHILS ABSOLUTE: 0 K/UL (ref 0–0.2)
BASOPHILS RELATIVE PERCENT: 0.6 %
BILIRUB SERPL-MCNC: 1.3 MG/DL (ref 0.2–0.7)
BUN BLDV-MCNC: 25 MG/DL (ref 8–23)
CALCIUM SERPL-MCNC: 9.3 MG/DL (ref 8.5–9.9)
CHLORIDE BLD-SCNC: 93 MEQ/L (ref 95–107)
CO2: 30 MEQ/L (ref 20–31)
CREAT SERPL-MCNC: 4.5 MG/DL (ref 0.5–0.9)
EOSINOPHILS ABSOLUTE: 0.2 K/UL (ref 0–0.7)
EOSINOPHILS RELATIVE PERCENT: 3.4 %
GFR AFRICAN AMERICAN: 11.9
GFR NON-AFRICAN AMERICAN: 9.8
GLOBULIN: 2.9 G/DL (ref 2.3–3.5)
GLUCOSE BLD-MCNC: 140 MG/DL (ref 70–99)
GLUCOSE BLD-MCNC: 152 MG/DL (ref 70–99)
GLUCOSE BLD-MCNC: 154 MG/DL (ref 70–99)
GLUCOSE BLD-MCNC: 178 MG/DL (ref 70–99)
GLUCOSE BLD-MCNC: 189 MG/DL (ref 70–99)
HCT VFR BLD CALC: 28.9 % (ref 37–47)
HEMOGLOBIN: 9.9 G/DL (ref 12–16)
LYMPHOCYTES ABSOLUTE: 1 K/UL (ref 1–4.8)
LYMPHOCYTES RELATIVE PERCENT: 15.1 %
MCH RBC QN AUTO: 32.5 PG (ref 27–31.3)
MCHC RBC AUTO-ENTMCNC: 34.4 % (ref 33–37)
MCV RBC AUTO: 94.3 FL (ref 82–100)
MONOCYTES ABSOLUTE: 0.7 K/UL (ref 0.2–0.8)
MONOCYTES RELATIVE PERCENT: 10.8 %
NEUTROPHILS ABSOLUTE: 4.6 K/UL (ref 1.4–6.5)
NEUTROPHILS RELATIVE PERCENT: 70.1 %
PDW BLD-RTO: 15.3 % (ref 11.5–14.5)
PERFORMED ON: ABNORMAL
PLATELET # BLD: 94 K/UL (ref 130–400)
POTASSIUM REFLEX MAGNESIUM: 4.3 MEQ/L (ref 3.4–4.9)
RBC # BLD: 3.06 M/UL (ref 4.2–5.4)
SODIUM BLD-SCNC: 135 MEQ/L (ref 135–144)
TOTAL PROTEIN: 6.4 G/DL (ref 6.3–8)
VANCOMYCIN RANDOM: 14.9 UG/ML (ref 10–40)
WBC # BLD: 6.6 K/UL (ref 4.8–10.8)

## 2022-07-02 PROCEDURE — 6370000000 HC RX 637 (ALT 250 FOR IP): Performed by: NURSE PRACTITIONER

## 2022-07-02 PROCEDURE — 99233 SBSQ HOSP IP/OBS HIGH 50: CPT | Performed by: PSYCHIATRY & NEUROLOGY

## 2022-07-02 PROCEDURE — 99232 SBSQ HOSP IP/OBS MODERATE 35: CPT | Performed by: INTERNAL MEDICINE

## 2022-07-02 PROCEDURE — 36415 COLL VENOUS BLD VENIPUNCTURE: CPT

## 2022-07-02 PROCEDURE — 6370000000 HC RX 637 (ALT 250 FOR IP): Performed by: INTERNAL MEDICINE

## 2022-07-02 PROCEDURE — 80053 COMPREHEN METABOLIC PANEL: CPT

## 2022-07-02 PROCEDURE — 6360000002 HC RX W HCPCS: Performed by: INTERNAL MEDICINE

## 2022-07-02 PROCEDURE — 80202 ASSAY OF VANCOMYCIN: CPT

## 2022-07-02 PROCEDURE — 97162 PT EVAL MOD COMPLEX 30 MIN: CPT

## 2022-07-02 PROCEDURE — 85025 COMPLETE CBC W/AUTO DIFF WBC: CPT

## 2022-07-02 PROCEDURE — 90935 HEMODIALYSIS ONE EVALUATION: CPT

## 2022-07-02 PROCEDURE — 1210000000 HC MED SURG R&B

## 2022-07-02 PROCEDURE — 99222 1ST HOSP IP/OBS MODERATE 55: CPT | Performed by: INTERNAL MEDICINE

## 2022-07-02 PROCEDURE — 2580000003 HC RX 258: Performed by: INTERNAL MEDICINE

## 2022-07-02 RX ORDER — LORAZEPAM 2 MG/ML
1 INJECTION INTRAMUSCULAR
Status: ACTIVE | OUTPATIENT
Start: 2022-07-02 | End: 2022-07-02

## 2022-07-02 RX ORDER — SODIUM CHLORIDE 9 MG/ML
INJECTION, SOLUTION INTRAVENOUS
Status: DISPENSED
Start: 2022-07-02 | End: 2022-07-03

## 2022-07-02 RX ORDER — INSULIN LISPRO 100 [IU]/ML
0-12 INJECTION, SOLUTION INTRAVENOUS; SUBCUTANEOUS
Status: DISCONTINUED | OUTPATIENT
Start: 2022-07-02 | End: 2022-07-09 | Stop reason: HOSPADM

## 2022-07-02 RX ORDER — INSULIN LISPRO 100 [IU]/ML
0-6 INJECTION, SOLUTION INTRAVENOUS; SUBCUTANEOUS NIGHTLY
Status: DISCONTINUED | OUTPATIENT
Start: 2022-07-02 | End: 2022-07-09 | Stop reason: HOSPADM

## 2022-07-02 RX ORDER — LIDOCAINE 4 G/G
1 PATCH TOPICAL DAILY
Status: DISCONTINUED | OUTPATIENT
Start: 2022-07-02 | End: 2022-07-04

## 2022-07-02 RX ADMIN — MICONAZOLE NITRATE: 2 POWDER TOPICAL at 09:07

## 2022-07-02 RX ADMIN — INSULIN LISPRO 1 UNITS: 100 INJECTION, SOLUTION INTRAVENOUS; SUBCUTANEOUS at 21:08

## 2022-07-02 RX ADMIN — ARIPIPRAZOLE 5 MG: 5 TABLET ORAL at 09:06

## 2022-07-02 RX ADMIN — INSULIN LISPRO 2 UNITS: 100 INJECTION, SOLUTION INTRAVENOUS; SUBCUTANEOUS at 09:07

## 2022-07-02 RX ADMIN — VANCOMYCIN HYDROCHLORIDE 1000 MG: 1 INJECTION, POWDER, LYOPHILIZED, FOR SOLUTION INTRAVENOUS at 16:21

## 2022-07-02 RX ADMIN — ACETAMINOPHEN 325MG 650 MG: 325 TABLET ORAL at 21:06

## 2022-07-02 RX ADMIN — ACETAMINOPHEN 325MG 650 MG: 325 TABLET ORAL at 03:11

## 2022-07-02 RX ADMIN — MICONAZOLE NITRATE: 2 POWDER TOPICAL at 21:35

## 2022-07-02 RX ADMIN — Medication 10 MG: at 21:06

## 2022-07-02 RX ADMIN — SERTRALINE HYDROCHLORIDE 50 MG: 50 TABLET ORAL at 21:06

## 2022-07-02 RX ADMIN — INSULIN GLARGINE 35 UNITS: 100 INJECTION, SOLUTION SUBCUTANEOUS at 21:08

## 2022-07-02 RX ADMIN — ASPIRIN 81 MG: 81 TABLET, COATED ORAL at 09:06

## 2022-07-02 RX ADMIN — INSULIN LISPRO 2 UNITS: 100 INJECTION, SOLUTION INTRAVENOUS; SUBCUTANEOUS at 18:30

## 2022-07-02 RX ADMIN — ATORVASTATIN CALCIUM 40 MG: 40 TABLET, FILM COATED ORAL at 21:06

## 2022-07-02 RX ADMIN — Medication 400 MG: at 09:06

## 2022-07-02 RX ADMIN — ISOSORBIDE MONONITRATE 120 MG: 60 TABLET, EXTENDED RELEASE ORAL at 09:06

## 2022-07-02 ASSESSMENT — PAIN SCALES - GENERAL
PAINLEVEL_OUTOF10: 0
PAINLEVEL_OUTOF10: 0
PAINLEVEL_OUTOF10: 3
PAINLEVEL_OUTOF10: 3

## 2022-07-02 ASSESSMENT — ENCOUNTER SYMPTOMS
ABDOMINAL PAIN: 0
VOMITING: 0
GASTROINTESTINAL NEGATIVE: 1
NAUSEA: 0
COUGH: 0
WHEEZING: 0
COLOR CHANGE: 0
SHORTNESS OF BREATH: 1
EYES NEGATIVE: 1
PHOTOPHOBIA: 0
SHORTNESS OF BREATH: 0
TROUBLE SWALLOWING: 0
BACK PAIN: 1
ALLERGIC/IMMUNOLOGIC NEGATIVE: 1
CHOKING: 0

## 2022-07-02 NOTE — CARE COORDINATION
Went to see patient to talk about Acute Inpatient Rehab referral, but the patient was on the phone and asked if I could come back later. I will try to see the patient later this afternoon. Electronically signed by Alize Dudley RN on 7/2/22 at 10:54 AM EDT    Attempted to see patient again regarding Acute Inpatient Rehab referral, however patient was off the unit to dialysis.  Electronically signed by Alize Dudley RN on 7/2/22 at 2:54 PM EDT

## 2022-07-02 NOTE — PROGRESS NOTES
Progress Note  Date:2022       PEYJ:[id number]  Patient Name:Michelle Hobbs     Date of Birth:[birth year]     Age:62 y.o. Subjective      No acute events overnight  Plan for dialysis today per nephrology. Objective         Vitals Last 24 Hours:  TEMPERATURE:  Temp  Av.6 °F (36.4 °C)  Min: 97.5 °F (36.4 °C)  Max: 97.7 °F (36.5 °C)  RESPIRATIONS RANGE: Resp  Av.3  Min: 16  Max: 18  PULSE OXIMETRY RANGE: SpO2  Av %  Min: 100 %  Max: 100 %  PULSE RANGE: Pulse  Av.5  Min: 69  Max: 85  BLOOD PRESSURE RANGE: Systolic (16MAJ), RFZ:197 , Min:106 , IRC:815   ; Diastolic (17FLO), ANA:40, Min:50, Max:58    I/O (24Hr): No intake or output data in the 24 hours ending 22 0827    PHYSICAL EXAM:   Constitutional: Obese elderly adult female reclining in bed in no acute distress  Head: NCAT  Eyes: PERRLA, EOMI  Neck: Trachea midline, phonation normal  Cardiovascular: RRR. No significant murmurs appreciated. 1+ bilateral pretibial edema. RUE AV fistula site noted. Pulmonary: Normal rate and effort of respiration on room air. Distant lung sounds secondary to habitus, however no significant adventitious lung sounds appreciated. No coughing during exam.  Abdomen: Obese, soft, nontense. Hypoactive bowel sounds. Grossly nontender to palpation. Neurologic: Alert, grossly oriented. Intermittent mild confusion. Follows all commands. Nonfocal.  Psychiatric: Pleasant and cooperative. MSK/integumentary: Right posterior costal cage tenderness, without significant bony displacement noted.       Labs/Imaging/Diagnostics    Labs:  CBC:  Recent Labs     22  0730 22  0328 22  0547   WBC 7.3 5.7 6.6   RBC 2.83* 3.03* 3.06*   HGB 9.1* 9.7* 9.9*   HCT 26.5* 28.4* 28.9*   MCV 93.6 93.9 94.3   RDW 15.1* 15.2* 15.3*   PLT 92* 80* 94*     CHEMISTRIES:  Recent Labs     22  0730 22  0328 22  0548    138 135   K 4.0 4.8 4.3   CL 95 96 93*   CO2 30 30 30   BUN 15 27* 25*   CREATININE 4.37* 5.48* 4.50*   GLUCOSE 226* 151* 140*   MG 1.8  --   --      PT/INR:  Recent Labs     06/30/22  0730   PROTIME 16.3*   INR 1.3     APTT:  Recent Labs     06/30/22  0730   APTT 30.3     LIVER PROFILE:  Recent Labs     06/30/22  0730 07/01/22  0328 07/02/22  0548   AST 40* 28 20   ALT 25 23 19   BILITOT 1.4* 1.3* 1.3*   ALKPHOS 132* 126 124       Imaging Last 24 Hours:  CT ABDOMEN PELVIS WO CONTRAST Additional Contrast? None    Result Date: 6/30/2022  CT ABDOMEN PELVIS WO CONTRAST: 6/30/2022 CLINICAL HISTORY:  severe low back and right flank pain after fall; assess for retroperitoneal hematoma . COMPARISON: 4/20/2022. TECHNIQUE: Spiral images were obtained of the abdomen and pelvis without contrast. All CT scans at this facility use dose modulation, iterative reconstruction, and/or weight based dosing when appropriate to reduce radiation dose to as low as reasonably achievable. FINDINGS: Minimally displaced acute fractures of the posterior right 10th and 11th ribs are noted. A small right pleural effusion has mildly decreased in size from the prior study. There is no organized hematoma, other displaced fractures, evidence of solid organ injury (within the limits of a noncontrast study), or other significant changes from 4/20/2022 identified. A small to moderate-sized left pleural effusion, mild probable scarring and/or atelectasis of the visualized lung bases, a moderate compression fracture of T11, and other previously described chronic findings appear unchanged. MINIMALLY DISPLACED ACUTE FRACTURES OF THE POSTERIOR RIGHT 10TH AND 11TH RIBS. NO OTHER SIGNIFICANT RECENT POSTTRAUMATIC COMPLICATION IDENTIFIED. CHRONIC FINDINGS, NOT SIGNIFICANTLY CHANGED FROM 4/20/2022. XR CHEST (2 VW)    Result Date: 6/30/2022  EXAMINATION:  CHEST. CLINICAL HISTORY:  CONFUSION. COMPARISONS:  6/2/2022. TECHNIQUE:  AP and lateral. FINDINGS: There is mild to moderate cardiomegaly.  No definite evidence of pulmonary venous congestion. Chronic changes are noted in the left lower thorax. There is a dialysis catheter in place. There is a valve prosthesis in place. There are small pleural effusions or blunting of both costophrenic angles. NO SIGNIFICANT CHANGE SINCE THE PREVIOUS EXAM. THERE HAS BEEN PLACEMENT OF A DIALYSIS CATHETER SINCE THAT EXAM.     CT HEAD WO CONTRAST    Result Date: 6/30/2022  CT HEAD WO CONTRAST : 6/30/2022 CLINICAL HISTORY:  fall from standing last night confused at dialysis c/o back pain . COMPARISON: 10/11/2021. TECHNIQUE: Spiral unenhanced images were obtained of the head, with routine multiplanar reconstructions performed. All CT scans at this facility use dose modulation, iterative reconstruction, and/or weight based dosing when appropriate to reduce radiation dose to as low as reasonably achievable. FINDINGS: There is no intracranial hemorrhage, mass effect, midline shift, extra-axial collection, evidence of hydrocephalus, recent ischemic infarct, or skull fracture identified. Chronic volume loss and mild mucosal thickening is again noted within the maxillary sinuses. The mastoid air cells and other visualized paranasal sinuses are essentially clear. NO ACUTE INTRACRANIAL PROCESS OR SIGNIFICANT CHANGE FROM 10/11/2021 IDENTIFIED. CT CHEST WO CONTRAST    Result Date: 6/30/2022  CT CHEST WO CONTRAST: 6/30/2022 CLINICAL HISTORY:  fall; rib fractures . COMPARISON: CT abdomen pelvis from earlier 6/30/2022, and chest CTA 1/12/2022. TECHNIQUE: Spiral imaging was obtained of the chest without contrast. All CT scans at this facility use dose modulation, iterative reconstruction, and/or weight based dosing when appropriate to reduce radiation dose to as low as reasonably achievable. FINDINGS: Minimally displaced acute fractures of the posterior right 10th and 11th ribs appear similar to the recent abdomen CT.  Mild right lateral rib deformities of the mid levels appears substantially similar to 1/12/2022, given the motion artifact on the previous exam. There is no organized hematoma, significant pulmonary contusion, or other acute posttraumatic complication identified. Since the prior study, a left internal jugular approach hemodialysis catheter has been placed with its tip within the SVC, and postoperative changes from previous mitral valve replacement are otherwise unremarkable. The heart remains mildly enlarged with  a very small pericardial effusion. A small to moderate-sized left pleural effusion, small right pleural effusion, and mild probable atelectasis or scarring of the mid to lower lung fields is noted. MINIMALLY DISPLACED ACUTE RIGHT 10TH AND 11TH RIB FRACTURES. NO OTHER ACUTE FRACTURE OR SIGNIFICANT POSTTRAUMATIC COMPLICATION IDENTIFIED. CT CERVICAL SPINE WO CONTRAST    Result Date: 6/30/2022  EXAMINATION:  CT CERVICAL SPINE. CLINICAL HISTORY:  TRAUMA. COMPARISONS:  10/11/2021. TECHNIQUE:  Serial 2.5 mm scans. No contrast. FINDINGS:  There is some reversal of the normal lordotic curve. There is slight scoliosis. There is significant degenerative arthritis with narrowing of all the interspaces. No fracture is seen. No prevertebral soft tissue swelling. SIGNIFICANT DEGENERATIVE ARTHRITIS. NO FRACTURE IS SEEN. CT THORACIC SPINE WO CONTRAST    Result Date: 6/30/2022  EXAMINATION:  CT THORACIC SPINE. CLINICAL HISTORY:  TRAUMA. COMPARISONS:  NONE AVAILABLE TECHNIQUE:   Serial 2.5 mm scans. No contrast.  FINDINGS:  There is mild thoracic kyphoscoliosis. There is a compression fracture of T11 with some anterior wedging. Radiographic lead this appears to be old rather than acute. No other fracture is seen. There is mild to moderate degenerative arthritis. We note a small pleural effusion on the right and a moderate sized effusion on the left. OLD COMPRESSION FRACTURE OF T11. AN ACUTE FRACTURE IS NOT SEEN. BILATERAL PLEURAL EFFUSIONS.      CT LUMBAR SPINE WO CONTRAST    Result Date: 6/30/2022  EXAMINATION:  CT LUMBAR SPINE. CLINICAL HISTORY:  TRAUMA. COMPARISONS:  NONE AVAILABLE TECHNIQUE:  Serial 2.5 mm scans. No contrast. FINDINGS:  Vertebral bodies are in fairly normal alignment. Interspaces are fairly well maintained. There is mild degenerative arthritis. No fracture is seen. MILD DEGENERATIVE ARTHRITIS. NO FRACTURE. XR CHEST PORTABLE    Result Date: 6/30/2022  COMPARISON: No to 30 of 2022. HISTORY: SOB TECHNIQUE: AP view FINDINGS: No consolidating pneumonia or pneumothorax is seen. The costophrenic angles again appear blunted. The cardiac silhouette is enlarged. There is again evidence of median sternotomy and valve replacement. A central venous catheter is again seen in place and  unchanged in position. . There are appear to be healed rib fractures in the left hemithorax. Degenerative changes are seen in the spine and visualized right shoulder. Findings may represent mild CHF. It is not significantly change from previous study. Assessment//Plan           Hospital Problems           Last Modified POA    * (Principal) MRSA bacteremia 7/1/2022 Yes    Impaired mobility and activities of daily living 6/30/2022 Yes    Closed T11 fracture ([address] 6/30/2022 Yes    Encephalopathy acute 6/30/2022 Yes    Sepsis due to Enterococcus ([address] 7/1/2022 Yes    Local infection due to central venous catheter 7/1/2022 Yes    Chronic diastolic congestive heart failure ([address] (Chronic) 6/30/2022 Yes    Closed rib fracture 6/30/2022 Yes    Dialysis patient ([address] 6/30/2022 Yes    Overview Signed 6/30/2022 11:26 AM by Honey Roberto,      Patient has hemodialysis Tuesday Thursday and Saturday. Multiple closed fractures of right lower extremity and ribs 6/30/2022 Yes    Overview Signed 6/30/2022 12:09 PM by Honey Roberto,      MINIMALLY DISPLACED ACUTE FRACTURES OF THE POSTERIOR RIGHT 10TH AND 11TH RIBS.              Assessment & Plan    Active problems:  · Weakness, fall -with history of multiple prior falls  · Right posterior 10th and 11th rib fractures, minimally displaced, with associated back pain  · Confusion/AMS in setting of above     Chronic problems:  · ESRD on HD (most recent treatment not completed)  · DM2 with neuropathy and nephropathy (50 units Lantus nightly and high correction sliding scale plus supplemental short acting with meals at home)  · Obesity  · Chronic anemia  · Hx seizures  · Hx CVA with baseline left-sided weakness  · Reported Hx tardive dyskinesia  · [place]  · Pulmonary hypertension  · Obesity  · Paranoid schizophrenia  · Moderate persistent asthma  · CAD s/p stenting and CABG  · Chronically elevated troponin, at baseline level  · Chronically elevated INR, baseline level  · Severe malnutrition (per Dietician)        Plan:  · Place in observation status  · Nephrology consulted for hemodialysis management  · Acute inpatient rehabilitation consult for inpatient rehab appropriateness  · PT/OT consult for evaluation and treatment  · Fall precautions  · N.p.o. except sips/chips and selected meds, s/p choking event with first post-admission meal, pending SLP swallowing evaluation and modified barium swallow test  · When p.o. intake resumed: Adult cardiac diabetic renal diet (plus any modifications per ST recommendations)  · Lantus 35 units nightly, with medium sliding scale every 4 hours while NPO, transition to [facility] 3 times daily and nightly when p.o. intake resumed. · Heparin TID for DVT prophylaxis  · Continue/hold home medications as ordered after medication history completed. 7/1: 2 of 2 BCx newly demonstrating MRSA bacteremia. Urgently started on loading dose vancomycin, dosing per pharmacy. Nephrology notified. Hemodialysis abbreviated, patient received vancomycin loading dose during third hour of hemodialysis. Infectious disease consult placed. (Patient away from room during multiple trips to the floor. Will see next day.   Case actively managed throughout

## 2022-07-02 NOTE — CONSULTS
Chief Complaint   Patient presents with    Back Pain     since fall last night from standing        Patient is a 57 y.o. female who presents with a chief complaint of fall. Patient is followed on a regular basis by Dr. Tulio Valencia MD.  Patient with past medical history of Crohn disease status post CABG/PCI, tricuspid valve repair, chronic congestive heart failure, end-stage renal disease hemodialysis dependent, hypertension, hyperlipidemia, diabetes mellitus type 2. Apparently patient on admission she was noted to have bacteremia with MRSA as well as Enterococcus faecalis. We are asked to rule out endocarditis. Patient is status post removal of temporary hemodialysis catheter. Status post recent right upper extremity fistula thrombectomy. She denies any chest pain or shortness of breath at this time      CV Data:  6/2020 Echo EF 60  Mild mR  RVSP 46   7/2020 LAD DEWEY. She has prior stents as well.   8/21 Echo EF 60  8/2021 LAD recurrent ISR with double layer stents. Went to [facility] and had redo stent. 12/21 CUS B/l ICA 50-69  12/21 Spec tnegative  1/12/22 Cath LAD IST   1/2022 CABG LIMA - LAD + TV Repair complicated by Tamponade and pericardial window.    4/25/22 eCHO ef 60 NO pe rvsp 68      Past Medical History:   Diagnosis Date    Angina at rest [facility]) 5/24/2020    Anxiety     CAD S/P percutaneous coronary angioplasty 2015, 2018    stents per dr Hieu Pearson    CHF (congestive heart failure) ([address]     CKD (chronic kidney disease) stage 4, GFR 15-29 ml/min ([address] 2/24/2018    CKD stage 4 due to type 2 diabetes mellitus ([address]     Contusion of right chest wall 2/16/2021    COPD (chronic obstructive pulmonary disease) ([address]     Diabetic nephropathy with proteinuria ([address] 2014    DJD (degenerative joint disease) of knee     Dr Elie Ward GERD (gastroesophageal reflux disease)     Hemiparesis, left ([address] 2013    [facility]    Hemodialysis patient [facility])  Hemodialysis-associated hypotension 10/22/2021    History of heart failure     History of seizures     History of type C viral hepatitis     HTN (hypertension)     Hyperlipidemia     Impaired mobility and activities of daily living     [facility]aden[facility] 2013    [facility]    Metabolic syndrome     Moderate persistent asthma without complication 3/35/7364    Need for extended care facility 7/7/2021    Neurogenic urinary incontinence 2013    Neuropathy in diabetes [facility])     Nonrheumatic mitral valve regurgitation 7/7/2021    Nonrheumatic tricuspid valve regurgitation 7/7/2021    Obesity (BMI 30-39. 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 ([address]     Type 2 diabetes mellitus with renal manifestations, controlled ([address] 2015    Insulin dependent, Dr Steffany Ambriz    Uncontrolled type 2 diabetes mellitus with hyperglycemia ([address]     Urinary incontinence due to cognitive impairment 2013    Vitamin D deficiency 2014      Patient Active Problem List   Diagnosis    Atherosclerotic heart disease of native coronary artery with unspecified angina pectoris (HCC)    Schizophrenia, paranoid, chronic    Metabolic syndrome    Vitamin B 12 deficiency    Cerebral microvascular disease    Mixed hyperlipidemia    Other hammer toe (acquired)    Vitamin D insufficiency    Incontinence of urine    Diabetic nephropathy with proteinuria ([address]    Essential (primary) hypertension    History of type C viral hepatitis    Urinary incontinence due to cognitive impairment    History of seizures    Stented coronary artery-plan is to stay on Plavix indefinately per Dr Quintero Parent Other specified diabetes mellitus with diabetic neuropathy, unspecified ([facility]    Controlled type 2 diabetes mellitus with diabetic neuropathy, with long-term current use of insulin ([facility]    Hemiparesis, left ([facility]    Angina, class II ([facility])    Pain, unspecified    Tardive dyskinesia    Shortness of breath    Chronic diastolic congestive heart failure ([facility]    Sleep apnea, unspecified    Pulmonary hypertension, unspecified ([facility]    Class 2 severe obesity with serious comorbidity and body mass index (BMI) of 36.0 to 36.9 in adult [facility])    Edema    Closed supracondylar fracture of right humerus    Other chronic pain    Palliative care patient    Recurrent falls    Renal failure    Difficulty in walking    ESRD (end stage renal disease) on dialysis ([facility]    Weakness    Other seizures ([facility]    Moderate persistent asthma without complication    [facility]    Post PTCA    Falls frequently    Chest wall contusion, left, initial encounter    Headache, unspecified    Paranoid schizophrenia ([facility]    Compression fracture of spine ([facility]    Closed rib fracture    Depression    Chronic obstructive pulmonary disease ([facility]    Critical illness polyneuropathy ([facility]    Multiple closed fractures of ribs of right side    Nonrheumatic mitral valve regurgitation    Nonrheumatic tricuspid valve regurgitation    Need for extended care facility    Chronic pain of both shoulders    Hemodialysis-associated hypotension    Anginal chest pain at rest [facility])    Chest pain    Unstable angina ([facility])    NSTEMI (non-ST elevated myocardial infarction) ([facility]    CKD (chronic kidney disease) stage 4, GFR 15-29 ml/min ([facility]    Hyperkalemia    Impaired mobility and activities of daily living    Dialysis patient [facility])    Unspecified open wound of right upper arm, initial encounter    Multiple closed fractures of right lower extremity and ribs    Closed T11 fracture ([facility]    Encephalopathy acute    MRSA bacteremia    Sepsis due to Enterococcus [facility])    Local infection due to central venous catheter       Past Surgical History:   Procedure Laterality Date     SECTION      x1    COLONOSCOPY  2014    Dr. Kannan Clark      x1 Dr. Maritza Love, Dr Angel Smith  08/10/2021    [facility]    DIAGNOSTIC CARDIAC CATH LAB PROCEDURE  10/02/2019    DIALYSIS CATHETER INSERTION Left 2022    Tunneled Symetrex 15.5F x 23cm hemodialysis catheter inserted by Dr. Alexis Mathis, TOTAL ABDOMINAL (CERVIX REMOVED)      one ovary intact, Dr Lucia Unger, menorrhagia    IR THROMBECTOMY PERCUT AVF  2022    IR THROMBECTOMY PERCUT AVF 2022 [facility] PROCED[facility]    IR TUNNELED CATHETER PLACEMENT GREATER THAN 5 YEARS  6/3/2022    IR TUNNELED CATHETER PLACEMENT GREATER THAN 5 YEARS 6/3/2022 [facility]ED[facility]    IL TOTAL KNEE ARTHROPLASTY Left 2018    LEFT KNEE TOTAL KNEE ARTHROPLASTY, SHAYNA, NERVE BLOCK performed by Itzel Gil MD at [address] [place] [address] PTCA      TOE AMPUTATION Right     TOTAL KNEE ARTHROPLASTY  2016    Dr Tyrone Alexandre TUNNELED [address] Right 2020    tunneled HD catheter per Dr Manuel Gamez Marital status:      Spouse name: None    Number of children: 2    Years of education: None    Highest education level: None   Occupational History    Occupation: disabled   Tobacco Use    Smoking status: Never Smoker    Smokeless tobacco: Never Used   Vaping Use    Vaping Use: Never used   Substance and Sexual Activity    Alcohol use: No     Alcohol/week: 0.0 standard drinks    Drug use: No    Sexual activity: Not Currently   Other Topics Concern    None   Social History Narrative    Disabled, lives in [place]- [facility] [place] close by     Born in [place], one of 5    Twin sister Reyes, very ill in 2018, dec 2019    Moved to [facility], , 2 children, one son and one daughter    Worked at [employer], as a nurse's aide    Disabled due to mental illness    Lived at [facility], was discharged, returned to independent living in 2017 in the daughter's house and has adjusted well    One son and one daughter, live in the same house with patient, Nevaeh Gama pays the rent    HobbiEspinela reading ([employer])             10/11/2021 [facility] updates; patient lives with her daughter son-in-law and 2 grandchildren and patient's handicapped son. Per daughter, her brother is blind, MRDD, multiple health issues. Daughter's  is patient's legal guardian. Patient has hemodialysis Tuesday Thursday and Saturday. Patient's bedroom is on main floor with a half bath. Daughter walks patient upstairs once weekly for full bath. Patient is using her walker in the home. Patient has a hospital bed in the home. Social Determinants of Health     Financial Resource Strain: Low Risk     Difficulty of Paying Living Expenses: Not hard at all   Food Insecurity: No Food Insecurity    Worried About Running Out of Food in the Last Year: Never true    Yung of Food in the Last Year: Never true   Transportation Needs: No Transportation Needs    Lack of Transportation (Medical): No    Lack of Transportation (Non-Medical):  No   Physical Activity: Sufficiently Active    Days of Exercise per Week: 3 days    Minutes of Exercise per Session: 60 min   Stress:     Feeling of Stress : Not on file   Social Connections:     Frequency of Communication with Friends and Family: Not on file    Frequency of Social Gatherings with Friends and Family: Not on file    Attends [employer] Services: Not on file    Active Member of Clubs or Organizations: Not on file    Attends Club or Organization Meetings: Not on file    Marital Status: Not on file   Intimate Partner Violence:     Fear of Current or Ex-Partner: Not on file   [employer] Abused: Not on file    Physically Abused: Not on file    Sexually Abused: Not on file   Housing Stability:     Unable to Pay for Housing in the Last Year: Not on file    Number of Places Lived in the Last Year: Not on file    Unstable Housing in the Last Year: Not on file       Family History   Problem Relation Age of Onset    Cancer Mother 76        survived   Marcela Se Breast Cancer Mother     Hypertension Father     Diabetes Sister     Mental Illness Sister     Colon Cancer Neg Hx        Current Facility-Administered Medications   Medication Dose Route Frequency Provider Last Rate Last Admin    insulin lispro (HUMALOG) injection vial 0-12 Units  0-12 Units SubCUTAneous TID WC Nicoletto Bolzan-Roche, APRN - CNP   2 Units at 07/02/22 0907    insulin lispro (HUMALOG) injection vial 0-6 Units  0-6 Units SubCUTAneous Nightly Nicoletto Bolzan-Roche, APRN - CNP        lidocaine 4 % external patch 1 patch  1 patch TransDERmal Daily Pooja Salter DO   1 patch at 07/02/22 1117    LORazepam (ATIVAN) injection 1 mg  1 mg IntraVENous Once PRN Roselyn Wyatt MD        heparin (porcine) injection 2,000 Units  2,000 Units IntraVENous Once Brenda Damon, DO        epoetin chadwick-epbx (RETACRIT) injection 10,000 Units  10,000 Units SubCUTAneous Once Brenda Radishailesh, DO        vancomycin (VANCOCIN) intermittent dosing (placeholder)   Other RX Placeholder Pooja Salter,         melatonin tablet 3 mg  3 mg Oral Nightly PRN Pooja Salter, DO        acetaminophen (TYLENOL) tablet 650 mg  650 mg Oral Q6H PRN Pooja Salter, DO   650 mg at 07/02/22 1731    Or    acetaminophen (TYLENOL) suppository 650 mg  650 mg Rectal Q6H PRN Pooja Salter, DO   650 mg at 07/01/22 1354    prochlorperazine (COMPAZINE) injection 10 mg  10 mg IntraVENous TID PRN Pooja Salter, DO   10 mg at 06/30/22 1507    albuterol (PROVENTIL) nebulizer solution 2.5 mg  2.5 mg Nebulization Q4H PRN Pooja Salter, DO        ARIPiprazole (ABILIFY) tablet 5 mg  5 mg Oral Daily Lenord Josey, DO   5 mg at 07/02/22 3420    aspirin EC tablet 81 mg  81 mg Oral Daily Lenord Josey, DO   81 mg at 07/02/22 6440    atorvastatin (LIPITOR) tablet 40 mg  40 mg Oral Nightly Lenord Josey, DO   40 mg at 07/01/22 2102    hydrOXYzine HCl (ATARAX) tablet 25 mg  25 mg Oral TID PRN Lenord Josey, DO        insulin glargine (LANTUS) injection vial 35 Units  35 Units SubCUTAneous Nightly Lenord Josey, DO   35 Units at 07/01/22 2103    isosorbide mononitrate (IMDUR) extended release tablet 120 mg  120 mg Oral Daily Lenord Josey, DO   120 mg at 07/02/22 2111    magnesium oxide (MAG-OX) tablet 400 mg  400 mg Oral Daily Lenord Josey, DO   400 mg at 07/02/22 2963    melatonin disintegrating tablet 10 mg  10 mg Oral Nightly Lenord Josey, DO   10 mg at 07/01/22 2102    midodrine (PROAMATINE) tablet 5 mg  5 mg Oral Once per day on Mon Wed Fri Roxy Hendrix, DO        miconazole (MICOTIN) 2 % powder   Topical BID Lenord Josey, DO   Given at 07/02/22 4678    sertraline (ZOLOFT) tablet 50 mg  50 mg Oral Nightly Lenord Josey, DO   50 mg at 07/01/22 2102    glucose chewable tablet 16 g  4 tablet Oral PRN Lenord Josey, DO        dextrose bolus 10% 125 mL  125 mL IntraVENous PRN Lenord Josey, DO        Or    dextrose bolus 10% 250 mL  250 mL IntraVENous PRN Lenord Josey, DO        glucagon (rDNA) injection 1 mg  1 mg IntraMUSCular PRN Lenord Josey, DO        dextrose 5 % solution  100 mL/hr IntraVENous PRN Lenord Josey, DO           ALLERGIES: Codeine and Oxycontin [oxycodone hcl]    Review of Systems   Constitutional: Negative. Negative for chills and fever. HENT: Negative. Eyes: Negative. Respiratory: Negative for shortness of breath and wheezing. Cardiovascular: Negative for chest pain, palpitations and leg swelling. Gastrointestinal: Negative. Negative for abdominal pain, nausea and vomiting. Endocrine: Negative. Genitourinary: Negative. Musculoskeletal: Negative. Skin: Negative. Negative for rash. Allergic/Immunologic: Negative. Neurological: Negative for dizziness, weakness and headaches. Hematological: Negative. Psychiatric/Behavioral: Negative. VITALS:  Blood pressure (!) 106/58, pulse 69, temperature 97.7 °F (36.5 °C), temperature source Oral, resp. rate 16, height 5' 7\" (1.702 m), weight 200 lb 9.6 oz (91 kg), SpO2 100 %, not currently breastfeeding. Body mass index is 31.42 kg/m². Physical Exam  Constitutional:       Appearance: She is well-developed. She is not diaphoretic. HENT:      Head: Normocephalic and atraumatic. Eyes:      Pupils: Pupils are equal, round, and reactive to light. Neck:      Thyroid: No thyromegaly. Vascular: No JVD. Trachea: No tracheal deviation. Cardiovascular:      Rate and Rhythm: Normal rate and regular rhythm. Chest Wall: PMI is not displaced. Pulses: Intact distal pulses. Heart sounds: Normal heart sounds. Heart sounds not distant. No murmur heard. No friction rub. No gallop. No S3 sounds. Pulmonary:      Effort: No respiratory distress. Breath sounds: No wheezing or rales. Chest:      Chest wall: No tenderness. Abdominal:      General: Bowel sounds are normal. There is no distension. Palpations: Abdomen is soft. There is no mass. Tenderness: There is no abdominal tenderness. There is no guarding or rebound. Musculoskeletal:      Cervical back: Normal range of motion and neck supple. Skin:     General: Skin is warm and dry. Coloration: Skin is not pale. Findings: No erythema or rash. Neurological:      Mental Status: She is alert and oriented to person, place, and time. Cranial Nerves: No cranial nerve deficit. Psychiatric:         Behavior: Behavior normal.         Thought Content:  Thought content normal.         Judgment: Judgment normal.         LABS:  Recent Results (from the past 24 hour(s)) Sedimentation Rate    Collection Time: 07/01/22  2:51 PM   Result Value Ref Range    Sed Rate 48 (H) 0 - 30 mm   POCT Glucose    Collection Time: 07/01/22  8:43 PM   Result Value Ref Range    POC Glucose 233 (H) 70 - 99 mg/dl    Performed on ACCU-CHEK    CBC with Auto Differential    Collection Time: 07/02/22  5:47 AM   Result Value Ref Range    WBC 6.6 4.8 - 10.8 K/uL    RBC 3.06 (L) 4.20 - 5.40 M/uL    Hemoglobin 9.9 (L) 12.0 - 16.0 g/dL    Hematocrit 28.9 (L) 37.0 - 47.0 %    MCV 94.3 82.0 - 100.0 fL    MCH 32.5 (H) 27.0 - 31.3 pg    MCHC 34.4 33.0 - 37.0 %    RDW 15.3 (H) 11.5 - 14.5 %    Platelets 94 (L) 205 - 400 K/uL    Neutrophils % 70.1 %    Lymphocytes % 15.1 %    Monocytes % 10.8 %    Eosinophils % 3.4 %    Basophils % 0.6 %    Neutrophils Absolute 4.6 1.4 - 6.5 K/uL    Lymphocytes Absolute 1.0 1.0 - 4.8 K/uL    Monocytes Absolute 0.7 0.2 - 0.8 K/uL    Eosinophils Absolute 0.2 0.0 - 0.7 K/uL    Basophils Absolute 0.0 0.0 - 0.2 K/uL   Comprehensive Metabolic Panel w/ Reflex to MG    Collection Time: 07/02/22  5:48 AM   Result Value Ref Range    Sodium 135 135 - 144 mEq/L    Potassium reflex Magnesium 4.3 3.4 - 4.9 mEq/L    Chloride 93 (L) 95 - 107 mEq/L    CO2 30 20 - 31 mEq/L    Anion Gap 12 9 - 15 mEq/L    Glucose 140 (H) 70 - 99 mg/dL    BUN 25 (H) 8 - 23 mg/dL    CREATININE 4.50 (H) 0.50 - 0.90 mg/dL    GFR Non-African American 9.8 (L) >60    GFR  11.9 (L) >60    Calcium 9.3 8.5 - 9.9 mg/dL    Total Protein 6.4 6.3 - 8.0 g/dL    Albumin 3.5 3.5 - 4.6 g/dL    Total Bilirubin 1.3 (H) 0.2 - 0.7 mg/dL    Alkaline Phosphatase 124 40 - 130 U/L    ALT 19 0 - 33 U/L    AST 20 0 - 35 U/L    Globulin 2.9 2.3 - 3.5 g/dL   Vancomycin Level, Random    Collection Time: 07/02/22  5:48 AM   Result Value Ref Range    Vancomycin Rm 14.9 10.0 - 40.0 ug/mL   POCT Glucose    Collection Time: 07/02/22  7:48 AM   Result Value Ref Range    POC Glucose 152 (H) 70 - 99 mg/dl    Performed on ACCU-CHEK Troponin:   Lab Results   Component Value Date/Time    TROPONINI 0.037 06/30/2022 07:30 AM           ASSESSMENT:    Bacteremia-rule out endocarditis  History of CAD status post multivessel PCI/CABG  History of tricuspid valve repair  History of postsurgical tamponade status post pericardial window  Diabetes mellitus  End-stage renal disease hemodialysis dependent  Essential hypertension  Hyperlipidemia  Elevated troponin-demand ischemia  Anemia  Multiple medical problem    PLAN:   1. As always, aggressive risk factor modification is strongly recommended. We should adhere to the JNC VIII guidelines for HTN management and the NCEPATP III guidelines for LDL-C management. 2. We will plan for NOELLE on Monday to rule out endocarditis. Patient is clinically stable at this time. No evidence of any complete heart block, CVA type symptoms. 3. Maximize cardiac medications  4. Nephrology recommendations  5. Monitor on telemetry  6. Maintain test between 3.5-4.5 and magnesium greater than 2  7. Monitor H&H closely  8. GI/DVT prophylaxis      Thank you for allowing me to participate in the care of your patient, please don't hesitate to contact me if you have any further questions.     Electronically signed by Tiffany Golden DO on 7/2/2022 at 12:06 PM

## 2022-07-02 NOTE — CARE COORDINATION
Patient not medically cleared for d/c. Patient d/c plans are to go to [address] [place] [address] once accepted. CM to continue to follow for d/c readiness. Patient was at home with [facility], if patient returns home will need a resumption of care order.

## 2022-07-02 NOTE — PROGRESS NOTES
Call received from the pts daughter Lulú Calderón. HIPAA code verified, update given. Lulú Calderón expresses concerns about the pt coming home and is requesting a rehab facility. Referred Lulú Calderón to speak with the . She also explains that the pt called her this AM telling her that she has an infection that has spread to her heart and is \"very bad\". Explained to Lulú Clio that their is a plan for the pt to have a NOELLE on Tuesday and that there is no current DX for a heart infection. After talking to Oliveriofatimah Calderón at great length, she seemed content with answers received. Pt remains in dialysis.

## 2022-07-02 NOTE — FLOWSHEET NOTE
07/02/22 1750   Vital Signs   BP (!) 106/47   Temp 97.5 °F (36.4 °C)   Heart Rate 77   Resp 16   SpO2 96 %   Weight 196 lb 3.4 oz (89 kg)   Percent Weight Change -2.19   Pain Assessment   Pain Level 0   Post-Hemodialysis Assessment   Post-Treatment Procedures Blood returned; Access bleeding time > 10 minutes   Machine Disinfection Process Acid/Vinegar Clean;Heat Disinfect   Rinseback Volume (ml) 200 ml   Total Liters Processed (l/min) 77 l/min   Dialyzer Clearance Lightly streaked   Duration of Treatment (minutes) 210 minutes   Hemodialysis Intake (ml) 400 ml   Hemodialysis Output (ml) 2400 ml   NET Removed (ml) 2000   Tolerated Treatment Good   Patient Response to Treatment prolonged bleeding from both needle sites, report given to Daiana   Bilateral Breath Sounds Diminished   Edema Generalized   Time Off 6214   Patient Disposition Return to room

## 2022-07-02 NOTE — PROGRESS NOTES
Physical Therapy Med Surg Initial Assessment  Facility/Department: [facility]  Room: [place] [id number]       NAME: Breanna Coleman  : 1960 (80 y.o.)  MRN: [id number]  CODE STATUS: Full Code    Date of Service: 2022    Patient Diagnosis(es): Weakness [R53.1]  Physical deconditioning [R53.81]  Bilateral pleural effusion [J90]  At high risk for injury related to fall [Z91.81]  Concussion without loss of consciousness, initial encounter [S06.0X0A]  Fall from standing, initial encounter [W19. XXXA]  Chronic renal failure, stage 5 (HCC) [N18.5]  Closed fracture of multiple ribs of right side, initial encounter [S22.41XA]  Class 1 obesity due to excess calories with serious comorbidity and body mass index (BMI) of 33.0 to 33.9 in adult [E66.09, Z68.33]   Chief Complaint   Patient presents with    Back Pain     since fall last night from standing     Patient Active Problem List    Diagnosis Date Noted    Unstable angina ([address] 2022    Chest pain     MRSA bacteremia 2022    Sepsis due to Enterococcus ([address]     Local infection due to central venous catheter     Impaired mobility and activities of daily living 2022    Closed T11 fracture ([address] 2022    Encephalopathy acute 2022    Unspecified open wound of right upper arm, initial encounter 2022    Hyperkalemia 2022    CKD (chronic kidney disease) stage 4, GFR 15-29 ml/min ([address] 2022    Dialysis patient ([address] 2022    Multiple closed fractures of right lower extremity and ribs 2022    NSTEMI (non-ST elevated myocardial infarction) ([facility] 2022    Anginal chest pain at rest [facility]) 2022    Hemodialysis-associated hypotension 10/22/2021    Chronic pain of both shoulders 2021    Nonrheumatic mitral valve regurgitation 2021    Nonrheumatic tricuspid valve regurgitation 2021    Need for extended care facility 2021    Multiple closed fractures of ribs of right side 04/05/2021    Depression 02/26/2021    Chronic obstructive pulmonary disease ([address] 02/26/2021    Critical illness polyneuropathy ([address] 02/26/2021    Compression fracture of spine ([address] 02/19/2021    Closed rib fracture 02/19/2021    Chest wall contusion, left, initial encounter 02/18/2021    Falls frequently 01/19/2021    Post PTCA     Headache, unspecified 01/03/2021    COVID-19 12/26/2020    Moderate persistent asthma without complication 09/12/8155    Weakness 08/05/2020    Difficulty in walking 07/27/2020    Atherosclerotic heart disease of native coronary artery with unspecified angina pectoris ([address] 07/03/2020    Essential (primary) hypertension 07/03/2020    Pain, unspecified 07/03/2020    ESRD (end stage renal disease) on dialysis ([address] 07/03/2020    Other seizures ([address] 07/03/2020    Paranoid schizophrenia ([address] 07/03/2020    Renal failure 06/28/2020    Recurrent falls 06/16/2020    Edema 12/23/2019    Closed supracondylar fracture of right humerus 12/23/2019    Other chronic pain 12/23/2019    Palliative care patient 12/23/2019    Class 2 severe obesity with serious comorbidity and body mass index (BMI) of 36.0 to 36.9 in adult [facility]) 12/03/2019    Sleep apnea, unspecified 11/05/2019    Pulmonary hypertension, unspecified ([address] 11/05/2019    Chronic diastolic congestive heart failure ([address] 10/04/2019    Shortness of breath 10/02/2019    Tardive dyskinesia 05/16/2019    Angina, class II ([address]     Controlled type 2 diabetes mellitus with diabetic neuropathy, with long-term current use of insulin ([address] 02/24/2017    Other specified diabetes mellitus with diabetic neuropathy, unspecified ([address] 08/04/2016    Stented coronary artery-plan is to stay on Plavix indefinately per Dr Georgie Clayton 06/02/2016    History of seizures     Urinary incontinence due to cognitive impairment     History of type C viral hepatitis     Diabetic nephropathy with proteinuria ([address]     Incontinence of urine 04/07/2014    Vitamin D insufficiency 02/04/2014    Vitamin B 12 deficiency 06/05/2013    Cerebral microvascular disease 06/05/2013    Hemiparesis, left ([address] 01/01/2013    Schizophrenia, paranoid, chronic     Metabolic syndrome     Mixed hyperlipidemia 12/09/2010    Other hammer toe (acquired) 12/13/2007        Past Medical History:   Diagnosis Date    Angina at rest [facility]) 5/24/2020    Anxiety     CAD S/P percutaneous coronary angioplasty 2015, 2018    stents per dr Marco Antonio Fofana    CHF (congestive heart failure) ([address]     CKD (chronic kidney disease) stage 4, GFR 15-29 ml/min ([address] 2/24/2018    CKD stage 4 due to type 2 diabetes mellitus ([address]     Contusion of right chest wall 2/16/2021    COPD (chronic obstructive pulmonary disease) ([address]     Diabetic nephropathy with proteinuria ([address] 2014    DJD (degenerative joint disease) of knee     Dr Josiah Cooley GERD (gastroesophageal reflux disease)     Hemiparesis, left ([address] 2013    entered Assisted Living ([facility])    Hemodialysis patient [facility])     Hemodialysis-associated hypotension 10/22/2021    History of heart failure     History of seizures     History of type C viral hepatitis     HTN (hypertension)     Hyperlipidemia     Impaired mobility and activities of daily living     Mediastinal lymphadenopathy 2013    Dr Gates Burkitt, Brian Leyva    Metabolic syndrome     Moderate persistent asthma without complication 4/98/3459    Need for extended care facility 7/7/2021    Neurogenic urinary incontinence 2013    Neuropathy in diabetes [facility])     Nonrheumatic mitral valve regurgitation 7/7/2021    Nonrheumatic tricuspid valve regurgitation 7/7/2021    Obesity (BMI 30-39. 9)     Recurrent UTI     S/P colonoscopy 2014    [facility], focal active colitis    Schizophrenia, paranoid, chronic ([address]     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 Mynor Daniels      x1 Dr. Edgard Alas, Dr Castano So  08/10/2021    [facility]    DIAGNOSTIC CARDIAC CATH LAB PROCEDURE  10/02/2019    DIALYSIS CATHETER INSERTION Left 2022    Tunneled Symetrex 15.5F x 23cm hemodialysis catheter inserted by Dr. Maximiliano Uribe, TOTAL ABDOMINAL (CERVIX REMOVED)      one ovary intact, Dr Zeynep Jacobs, menorrhagia    IR THROMBECTOMY PERCUT AVF  2022    IR THROMBECTOMY PERCUT AVF 2022 [facility] PROCED[facility]    IR TUNNELED CATHETER PLACEMENT GREATER THAN 5 YEARS  6/3/2022    IR TUNNELED CATHETER PLACEMENT GREATER THAN 5 YEARS 6/3/2022 [facility] PROCED[facility]    CA TOTAL KNEE ARTHROPLASTY Left 2018    LEFT KNEE TOTAL KNEE ARTHROPLASTY, SHAYNA, NERVE BLOCK performed by Camila Mujica MD at [address] [place] [address] PTCA      TOE AMPUTATION Right     TOTAL KNEE ARTHROPLASTY  2016    Dr Nettie Perez TUNNELED [address] Right 2020    tunneled HD catheter per Dr Adler Cool: Yes  Patient assessed for rehabilitation services?: Yes  Family / Caregiver Present: No    Restrictions:        SUBJECTIVE:   Subjective: Pt reports keeps falling despite being in outpt rehab x 2 months    Pain   8/10 L knee    Prior Level of Function:  Social/Functional History  Lives With: Daughter  Type of Home: House  Home Layout: Two level,Able to Live on Main level with bedroom/bathroom  Home Access: Ramped entrance  Bathroom Shower/Tub:  (washes up at the sink)  Bathroom Toilet: Standard  Home Equipment: Yonny Au, rolling,Hospital bed  Has the patient had two or more falls in the past year or any fall with injury in the past year?: Yes  Receives Help From: Family  Homemaking Assistance: Needs assistance  Meal Prep: Moderate  Laundry: Moderate  Vacuuming: Maximal  Cleaning: Maximal  Gardening: Unable to assess (comment)  Driving: Unable to assess (comment)  Shopping: Maximal  Ambulation Assistance: Independent  Active : No  Occupation: On disability  Leisure & Hobbies: none    OBJECTIVE:   Vision  Vision Exceptions: Wears glasses at all times (not present currently)  Hearing: Within functional limits    Cognition:  Overall Orientation Status: Within Normal Limits    Observation/Palpation  Posture: Fair  Palpation: L knee warm to touch and tender anteriorly and posteriorly.   Observation: bruising on back from fall  Scar: B TKR scars    ROM:  RLE AROM: WFL  LLE AROM : WFL  LLE General AROM: pain with movement L knee  RUE AROM : WFL  LUE AROM : WFL    Strength:  Strength RLE  Comment: 3+/5 throughout except 4-/5 Ankle DF  Strength LLE  Comment: 3-/5 L hip, 2/5 knee, and 4-/5 ankle DF  Strength RUE  Strength RUE: WFL  Strength LUE  Strength LUE: WFL    Neuro:  Balance  Posture: Good  Sitting - Static: Good  Sitting - Dynamic: Fair  Standing - Static: Fair  Standing - Dynamic: Poor     Bed mobility  Supine to Sit: Contact guard assistance  Sit to Supine: Minimal assistance    Transfers  Sit to Stand: Contact guard assistance  Stand to sit: Contact guard assistance  Bed to Chair: Contact guard assistance    Ambulation  Surface: level tile  Device: Rollator  Assistance: Contact guard assistance  Gait Deviations: Slow Tere  Distance: 2 ft limited by L knee pain    Stairs/Curb  Stairs?: No    Activity Tolerance  Activity Tolerance: Patient limited by pain            ASSESSMENT: Body Structures, Functions, Activity Limitations Requiring Skilled Therapeutic Intervention: Decreased functional mobility ; Decreased ADL status; Decreased ROM; Decreased balance;Decreased strength;Decreased posture; Increased pain  Decision Making: Medium Complexity  History: high  Exam: med  Clinical Presentation: unpredictable    Treatment Diagnosis: Dfificulty ambulating  Therapy Prognosis: Fair;Good         DISCHARGE RECOMMENDATIONS:  Discharge Recommendations: Continue to assess pending progress    Assessment: Pt would benefit skilled PT to address goals and improve deficits before returning home. Pt limited by L knee pain with a previous TKR and nsg notified warm to touch, painful with movement. PLAN OF CARE:  Plan  Plan: 1 time a day 3-6 times a week  Plan weeks: 2 weeks  Current Treatment Recommendations: Strengthening,ROM,Balance training,Functional mobility training,Transfer training,Stair training,Gait training,Manual Therapy - Soft Tissue Mobilization,Manual Therapy - Joint Manipulation,Manual lymphatic drainage,Home exercise program,Safety education & training,Patient/Caregiver education & training    Restraints  Restraints Initially in Place: No    Goals:  Patient goals : Improve function  Short Term Goals  Time Frame for Short term goals: 2-4 weeks  Short term goal 1: Indep HEP for symptom mgmt  Short term goal 2: Indep bed mobility  Short term goal 3: Indep transfers bed to chair with assistive device  Short term goal 4: Ambulate with rollator Indep /=50' to allow safe return home. [facility] (6 CLICK) BASIC MOBILITY  AM-PAC Inpatient Mobility without Stair Climbing Raw Score : 14     Therapy Time:   Individual   Time In 1200   Time Out 1220   Minutes 20           Geeta Holloway [place], 07/02/22 at 12:34 PM         Definitions for assistance levels  Independent = pt does not require any physical supervision or assistance from another person for activity completion. Device may be needed.   Stand by assistance = pt requires verbal cues or instructions from another person, close to but not touching, to perform the activity  Minimal assistance= pt performs 75% or more of the activity; assistance is required to complete the activity  Moderate assistance= pt performs 50% of the activity; assistance is required to complete the activity  Maximal assistance = pt performs 25% of the activity; assistance is required to complete the activity  Dependent = pt requires total physical assistance to accomplish the task

## 2022-07-02 NOTE — PROGRESS NOTES
Infectious Disease     Patient Name: Herlinda Celaya  Date: 7/2/2022  YOB: 1960  Medical Record Number: [id number]        Sepsis with Enterococcus  Infected dialysis cath    History of Present Illness:  Anxiety congestive heart failure end-stage renal disease on hemodialysis COPD diabetes gastroesophageal reflux disease hypertension hepatitis C mitral valve regurgitation  Recurrent urinary tract infections bladder incontinence schizophrenia CVA  coronary artery bypass graft x1 vessel with tricuspid valve repair on 1/21/2022          Patient presented to the hospital from home after a fall having back pain emergency department, patient had fracture of 10th and 11th ribs  Negative for COVID      Blood cultures from admission 2 sets growing gram-positive cocci in chains  Identified as Enterococcus faecalis by PCR  Patient currently on vancomycin    Patient has dialysis catheter in left chest which has been used while her dialysis fistula in right upper extremity has been repaired she states they are now using the fistula in right arm            Review of Systems   Constitutional: Negative for chills, diaphoresis, fatigue and fever. HENT: Negative. Respiratory: Positive for shortness of breath. Negative for cough. Cardiovascular: Negative. Gastrointestinal: Negative. Endocrine: Negative. Genitourinary: Negative. Musculoskeletal: Negative. Physical Exam  Constitutional:       Appearance: She is ill-appearing. Cardiovascular:      Heart sounds: Murmur heard. Pulmonary:      Effort: Pulmonary effort is normal. No respiratory distress. Breath sounds: Normal breath sounds. No wheezing, rhonchi or rales. Abdominal:      General: Abdomen is flat. Bowel sounds are normal. There is no distension. Palpations: Abdomen is soft. There is no mass. Tenderness: There is no abdominal tenderness. There is no guarding or rebound. Hernia: No hernia is present. Blood pressure 101/63, pulse 81, temperature 98.2 °F (36.8 °C), resp. rate 16, height 5' 7\" (1.702 m), weight 196 lb 3.4 oz (89 kg), SpO2 96 %, not currently breastfeeding.       .   Lab Results   Component Value Date    WBC 6.6 07/02/2022    HGB 9.9 (L) 07/02/2022    HCT 28.9 (L) 07/02/2022    MCV 94.3 07/02/2022    PLT 94 (L) 07/02/2022     Lab Results   Component Value Date/Time     07/02/2022 05:48 AM    K 4.3 07/02/2022 05:48 AM    CL 93 07/02/2022 05:48 AM    CO2 30 07/02/2022 05:48 AM    BUN 25 07/02/2022 05:48 AM    CREATININE 4.50 07/02/2022 05:48 AM    GLUCOSE 140 07/02/2022 05:48 AM    GLUCOSE 419 07/30/2021 08:16 AM    CALCIUM 9.3 07/02/2022 05:48 AM                ASSESSMENT:  PLAN:    Sepsis with Enterococcus  Infected dialysis cath  Continue vancomycin  Need to rule out endocarditis      Repeat blood cultures

## 2022-07-02 NOTE — PROGRESS NOTES
[facility] Occupational Therapy      Date: 2022  Patient Name: Mariposa Castro        MRN: [id number]  Account: [de-identified]   : 1960  (57 y.o.)  Room: [place] [id number]    Chart reviewed, attempted OT at 2:40 p.m.  for eval. Patient not seen 2° to:    Pt. off floor for test/procedure, dialysis. Will attempt again when able.     Electronically signed by CATHY Baltazar on 2022 at 2:40 PM

## 2022-07-02 NOTE — PROGRESS NOTES
Neurology Follow up    SUBJECTIVE: Complains of back pain and weakness in the lower extremities. Patient has MRSA bacteremia.     Current Facility-Administered Medications   Medication Dose Route Frequency Provider Last Rate Last Admin    insulin lispro (HUMALOG) injection vial 0-12 Units  0-12 Units SubCUTAneous TID WC Nicoletto Bolzan-Roche APRN - CNP   2 Units at 07/02/22 0907    insulin lispro (HUMALOG) injection vial 0-6 Units  0-6 Units SubCUTAneous Nightly Nicoletto Bolzan-Roche, APRN - CNP        lidocaine 4 % external patch 1 patch  1 patch TransDERmal Daily Nancie Crys, DO   1 patch at 07/02/22 1117    LORazepam (ATIVAN) injection 1 mg  1 mg IntraVENous Once PRN Hilary Fernandez MD        heparin (porcine) injection 2,000 Units  2,000 Units IntraVENous Once Helder Hails, DO        epoetin chadwick-epbx (RETACRIT) injection 10,000 Units  10,000 Units SubCUTAneous Once Helder Hails, DO        vancomycin [facility]) intermittent dosing (placeholder)   Other RX Placeholder Nancie Crys, DO        melatonin tablet 3 mg  3 mg Oral Nightly PRN Nancie Crys, DO        acetaminophen (TYLENOL) tablet 650 mg  650 mg Oral Q6H PRN Nancie Crys, DO   650 mg at 07/02/22 7415    Or    acetaminophen (TYLENOL) suppository 650 mg  650 mg Rectal Q6H PRN Nancie Crys, DO   650 mg at 07/01/22 1354    prochlorperazine (COMPAZINE) injection 10 mg  10 mg IntraVENous TID PRN Nancie Crys, DO   10 mg at 06/30/22 1507    albuterol (PROVENTIL) nebulizer solution 2.5 mg  2.5 mg Nebulization Q4H PRN Nancie Crys, DO        ARIPiprazole (ABILIFY) tablet 5 mg  5 mg Oral Daily Nancie Crys, DO   5 mg at 07/02/22 0906    aspirin EC tablet 81 mg  81 mg Oral Daily Nancie Crys, DO   81 mg at 07/02/22 8390    atorvastatin (LIPITOR) tablet 40 mg  40 mg Oral Nightly Nancie Crys, DO   40 mg at 07/01/22 2102    hydrOXYzine HCl (ATARAX) tablet 25 mg  25 mg Oral TID PRN Nancie Crys, DO        insulin glargine (LANTUS) injection vial 35 Units  35 Units SubCUTAneous Nightly Beverlie Gaetano, DO   35 Units at 07/01/22 2103    isosorbide mononitrate (IMDUR) extended release tablet 120 mg  120 mg Oral Daily Beverlie Gaetano, DO   120 mg at 07/02/22 6554    magnesium oxide (MAG-OX) tablet 400 mg  400 mg Oral Daily Beverlie Gaetano, DO   400 mg at 07/02/22 9486    melatonin disintegrating tablet 10 mg  10 mg Oral Nightly Beverlie Gaetano, DO   10 mg at 07/01/22 2102    midodrine (PROAMATINE) tablet 5 mg  5 mg Oral Once per day on Mon Wed Fri Roxy Hendrix, DO        miconazole (MICOTIN) 2 % powder   Topical BID Beverlie Gaetano, DO   Given at 07/02/22 9406    sertraline (ZOLOFT) tablet 50 mg  50 mg Oral Nightly Beverlie Gaetano, DO   50 mg at 07/01/22 2102    glucose chewable tablet 16 g  4 tablet Oral PRN Beverlie Gaetano, DO        dextrose bolus 10% 125 mL  125 mL IntraVENous PRN Beverlie Gaetano, DO        Or    dextrose bolus 10% 250 mL  250 mL IntraVENous PRN Beverlie Gaetano, DO        glucagon (rDNA) injection 1 mg  1 mg IntraMUSCular PRN Beverlie Gaetano, DO        dextrose 5 % solution  100 mL/hr IntraVENous PRN Beverlie Gaetano, DO           PHYSICAL EXAM:    BP (!) 106/58   Pulse 69   Temp 97.7 °F (36.5 °C) (Oral)   Resp 16   Ht 5' 7\" (1.702 m)   Wt 200 lb 9.6 oz (91 kg)   LMP  (LMP Unknown)   SpO2 100%   BMI 31.42 kg/m²    General Appearance:      Skin:  normal  CVS - Normal sounds, No murmurs , No carotid Bruits  RS -CTA  Abdomen Soft, BS present  Review of Systems   Constitutional: Negative for fever. HENT: Negative for ear pain, tinnitus and trouble swallowing. Eyes: Negative for photophobia and visual disturbance. Respiratory: Negative for choking and shortness of breath. Cardiovascular: Negative for chest pain and palpitations. Gastrointestinal: Negative for nausea and vomiting. Musculoskeletal: Positive for arthralgias, back pain, gait problem and myalgias. Negative for joint swelling, neck pain and neck stiffness. Skin: Negative for color change. Allergic/Immunologic: Negative for food allergies. Neurological: Positive for weakness. Negative for dizziness, tremors, seizures, syncope, facial asymmetry, speech difficulty, light-headedness, numbness and headaches. Psychiatric/Behavioral: Negative for behavioral problems, confusion, hallucinations and sleep disturbance.       Mental Status Exam:             Level of Alertness:   awake            Orientation:   person, place, time                    Attention/Concentration:  normal            Language:  normal      Funduscopic Exam:     Cranial Nerves            Cranial nerve III           Pupils:  equal, round, reactive to light      Cranial nerves III, IV, VI           Extraocular Movements: intact      Cranial nerve V           Facial sensation:  intact      Cranial nerve VII           Facial strength: intact      Cranial nerve VIII           Hearing:  intact      Cranial nerve IX           Palate:  intact      Cranial nerve XI         Shoulder shrug:  intact      Cranial nerve XII          Tongue movement:  normal    Motor: No extremity strength of 4/ 5 with areflexia            Sensory:        Pinprick             Right Upper Extremity:  normal             Left Upper Extremity:  normal             Right Lower Extremity:  normal             Left Lower Extremity:  normal           Vibration                         Touch            Proprioception                 Coordination:           Finger/Nose   Right:  normal              Left:  normal          Heel-Knee-Shin                Right:  normal              Left:  normal          Rapid Alternating Movements              Right:  normal              Left:  normal          Gait:                       Casual: proximal muscle weakness is notable upon standing reflexes:             Deep Tendon Reflexes:             Reflexes are 2 +             Plantar response:                Right:  downgoing               Left: downgoing    Vascular:  Cardiac Exam:  normal         FL MODIFIED BARIUM SWALLOW W VIDEO    Result Date: 7/1/2022  Daniel Still MODIFIED BARIUM SWALLOW W VIDEO : 7/1/2022 CLINICAL HISTORY:  Choking with meal, concern for aspiration/dysphagia . COMPARISON: 5/10/2013. TECHNIQUE: Lateral videofluoroscopy was provided during speech therapy evaluation during ingestion of various barium liquids and semisolids. A total of 1.6 minutes of fluoroscopy time was used, with a total of  10 fluoroscopic series and one still saved. No diagnostic images were saved. Oral contrast:  50 mL BaSO4 FINDINGS: Mild to moderate oral dysphagia is observed with premature entry into the piriform sinuses and mild to moderate oral residual that cleared with independent re-swallowing. Endovaginal laryngeal penetration was observed with thin and soft barium consistencies that immediately cleared. Mild to moderate residual within the vallecula cleared with recent swallowing. No tracheal aspiration was identified. A full report will be made by the speech pathology team.     MILD TO MODERATELY ABNORMAL MODIFIED BARIUM SWALLOW. NO TRACHEAL ASPIRATION IDENTIFIED. A FULL REPORT WILL BE MADE BY THE SPEECH PATHOLOGY TEAM.       Recent Labs     06/30/22 0730 07/01/22 0328 07/02/22  0547   WBC 7.3 5.7 6.6   HGB 9.1* 9.7* 9.9*   PLT 92* 80* 94*     Recent Labs     06/30/22 0730 07/01/22 0328 07/02/22  0548    138 135   K 4.0 4.8 4.3   CL 95 96 93*   CO2 30 30 30   BUN 15 27* 25*   CREATININE 4.37* 5.48* 4.50*   GLUCOSE 226* 151* 140*     Recent Labs     06/30/22 0730 07/01/22 0328 07/02/22  0548   BILITOT 1.4* 1.3* 1.3*   ALKPHOS 132* 126 124   AST 40* 28 20   ALT 25 23 19     Lab Results   Component Value Date/Time    PROTIME 16.3 06/30/2022 07:30 AM    INR 1.3 06/30/2022 07:30 AM     No results found for: LITHIUM, DILFRTOT, VALPROATE    ASSESSMENT AND PLAN  With ataxia with falls with lower extremity weakness. A lumbar canal stenosis must be ruled out. Recommend an MRI of the lumbosacral spine and the brain given that she has underlying bacteremia to make sure this is not endocarditis. She also has peripheral neuropathy to explain her lower extremity weakness as well had findings discussed. Darren Kemp MD, Susannah Petersen, American Board of Psychiatry & Neurology  Board Certified in Vascular Neurology  Board Certified in Neuromuscular Medicine  Certified in . [address]

## 2022-07-02 NOTE — PROGRESS NOTES
[address] [facility] Pharmacokinetic Monitoring Service - Vancomycin    Consulting Provider: Dr Robert Mendoza   Indication: bacteremia. Target Concentration: Pre-Dialysis Concentration 21-24 mg/L  Day of Therapy: 2  Additional Antimicrobials: n/a    Pertinent Laboratory Values: Wt Readings from Last 1 Encounters:   07/01/22 200 lb 9.6 oz (91 kg)     Temp Readings from Last 1 Encounters:   07/02/22 97.7 °F (36.5 °C) (Oral)     Recent Labs     07/01/22  0328 07/02/22  0547 07/02/22  0548   CREATININE 5.48*  --  4.50*   WBC 5.7 6.6  --        Recent vancomycin administrations                     vancomycin (VANCOCIN) 2,000 mg in dextrose 5 % 500 mL IVPB (mg) 2,000 mg New Bag 07/01/22 1121                    Assessment:  Date/Time Current Dose Concentration Dialysis Session   7-2-22 1350 2000m g x1 14.9 7/1, repeat 7/2     Plan:  Concentration-guided dosing due to renal impairment and intermittent hemodialysis   Current dosing regimen of 2000 mg x1 was first and only dose given to patient thus far. Dr Kalpana Manzano has ordered 1000mg (11mg/kg) to be given with HD today. Random level  is below goal for severe infection but was obtained after loading dose to ensure not supratherapeutic. The dose of 1000mg ordered by physician is in usual maintenance dose range of 10-15mg/kg post-HD.   Pharmacy will continue to monitor patient and adjust therapy as indicated    Thank you for the consult,  Manuel Sprague, [facility]  7/2/2022 1:38 PM

## 2022-07-02 NOTE — PROGRESS NOTES
Nephrology Progress Note  Right arm  Access /bruits  Assessment:  ESRDX MRSA septecemia  Mitral valve replacement  DM type-2  Hypertension  Hx Seizures  Anemia  Hx Schizophrenia  Hx CVA left weakness      Plan:  Dialysis catheter removed  On Vancomycin  pharmacr to dose qod    Patient Active Problem List:     Atherosclerotic heart disease of native coronary artery with unspecified angina pectoris (HCC)     Schizophrenia, paranoid, chronic     Metabolic syndrome     Vitamin B 12 deficiency     Cerebral microvascular disease     Mixed hyperlipidemia     Other hammer toe (acquired)     Vitamin D insufficiency     Incontinence of urine     Diabetic nephropathy with proteinuria ([facility]     Essential (primary) hypertension     History of type C viral hepatitis     Urinary incontinence due to cognitive impairment     History of seizures     Stented coronary artery-plan is to stay on Plavix indefinately per Dr Maritza Love     Other specified diabetes mellitus with diabetic neuropathy, unspecified ([facility]     Controlled type 2 diabetes mellitus with diabetic neuropathy, with long-term current use of insulin (HCC)     Hemiparesis, left (HCC)     Angina, class II (HCC)     Pain, unspecified     Tardive dyskinesia     Shortness of breath     Chronic diastolic congestive heart failure (HCC)     Sleep apnea, unspecified     Pulmonary hypertension, unspecified (HCC)     Class 2 severe obesity with serious comorbidity and body mass index (BMI) of 36.0 to 36.9 in [facility])     Edema     Closed supracondylar fracture of right humerus     Other chronic pain     Palliative care patient     Recurrent falls     Renal failure     Difficulty in walking     ESRD (end stage renal disease) on dialysis (HCC)     Weakness     Other seizures (HCC)     Moderate persistent asthma without complication     [id number]     Post PTCA     Falls frequently     Chest wall contusion, left, initial encounter     Headache, unspecified     Paranoid schizophrenia ([facility]     Compression fracture of spine ([facility])     Closed rib fracture     Depression     Chronic obstructive pulmonary disease ([facility])     Critical illness polyneuropathy ([facility])     Multiple closed fractures of ribs of right side     Nonrheumatic mitral valve regurgitation     Nonrheumatic tricuspid valve regurgitation     Need for extended care facility     Chronic pain of both shoulders     Hemodialysis-associated hypotension     Anginal chest pain at rest [facility])     Chest pain     Unstable angina ([facility])     NSTEMI (non-ST elevated myocardial infarction) ([facility])     CKD (chronic kidney disease) stage 4, GFR 15-29 ml/min ([facility])     Hyperkalemia     Impaired mobility and activities of daily living     Dialysis patient [facility])     Unspecified open wound of right upper arm, initial encounter     Multiple closed fractures of right lower extremity and ribs     Closed T11 fracture ([facility])     Encephalopathy acute     MRSA bacteremia     Sepsis due to Enterococcus ([facility]     Local infection due to central venous catheter      Subjective:  Admit Date: 6/30/2022    Interval History: no compliants slightly stronger    Medications:  Scheduled Meds:   insulin lispro  0-12 Units SubCUTAneous TID WC    insulin lispro  0-6 Units SubCUTAneous Nightly    heparin (porcine)  2,000 Units IntraVENous Once    epoetin chadwick-epbx  10,000 Units SubCUTAneous Once    vancomycin (VANCOCIN) intermittent dosing (placeholder)   Other RX Placeholder    ARIPiprazole  5 mg Oral Daily    aspirin EC  81 mg Oral Daily    atorvastatin  40 mg Oral Nightly    insulin glargine  35 Units SubCUTAneous Nightly    isosorbide mononitrate  120 mg Oral Daily    magnesium oxide  400 mg Oral Daily    melatonin  10 mg Oral Nightly    midodrine  5 mg Oral Once per day on Mon Wed Fri    miconazole   Topical BID    sertraline  50 mg Oral Nightly     Continuous Infusions:   dextrose         CBC:   Recent Labs     07/01/22  0328 07/02/22  0547   WBC 5.7 6.6   HGB 9.7* 9.9*   PLT 80* 94*     CMP:    Recent Labs     06/30/22  0730 07/01/22  0328 07/02/22  0548    138 135   K 4.0 4.8 4.3   CL 95 96 93*   CO2 30 30 30   BUN 15 27* 25*   CREATININE 4.37* 5.48* 4.50*   GLUCOSE 226* 151* 140*   CALCIUM 9.0 9.6 9.3   LABGLOM 10.2* 7.8* 9.8*     Troponin:   Recent Labs     06/30/22  0730   TROPONINI 0.037*     BNP: No results for input(s): BNP in the last 72 hours. INR:   Recent Labs     06/30/22  0730   INR 1.3     Lipids: No results for input(s): CHOL, LDLDIRECT, TRIG, HDL, AMYLASE, LIPASE in the last 72 hours. Liver:   Recent Labs     07/02/22  0548   AST 20   ALT 19   ALKPHOS 124   PROT 6.4   LABALBU 3.5   BILITOT 1.3*     Iron:  No results for input(s): IRONS, FERRITIN in the last 72 hours. Invalid input(s): LABIRONS  Urinalysis: No results for input(s): UA in the last 72 hours.     Objective:  Vitals: BP (!) 106/58   Pulse 69   Temp 97.7 °F (36.5 °C) (Oral)   Resp 16   Ht 5' 7\" (1.702 m)   Wt 200 lb 9.6 oz (91 kg)   LMP  (LMP Unknown)   SpO2 100%   BMI 31.42 kg/m²    Wt Readings from Last 3 Encounters:   07/01/22 200 lb 9.6 oz (91 kg)   06/22/22 217 lb (98.4 kg)   06/06/22 215 lb 9.8 oz (97.8 kg)      24HR INTAKE/OUTPUT:  No intake or output data in the 24 hours ending 07/02/22 0846    General: alert, in no apparent distress  HEENT: normocephalic, atraumatic, anicteric  Neck: supple, no mass  Lungs: non-labored respirations, clear to auscultation bilaterally  Heart: regular rate and rhythm, no murmurs or rubs  Abdomen: soft, non-tender, non-distended  Ext: no cyanosis, no peripheral edema  Neuro: alert and oriented, no gross abnormalities  Psych: normal mood and affect  Skin: no rash      Electronically signed by Leah Cheng DO, MD

## 2022-07-03 LAB
ALBUMIN SERPL-MCNC: 3.3 G/DL (ref 3.5–4.6)
ALP BLD-CCNC: 114 U/L (ref 40–130)
ALT SERPL-CCNC: 15 U/L (ref 0–33)
ANION GAP SERPL CALCULATED.3IONS-SCNC: 9 MEQ/L (ref 9–15)
AST SERPL-CCNC: 16 U/L (ref 0–35)
BASOPHILS ABSOLUTE: 0 K/UL (ref 0–0.2)
BASOPHILS RELATIVE PERCENT: 0.6 %
BILIRUB SERPL-MCNC: 0.9 MG/DL (ref 0.2–0.7)
BUN BLDV-MCNC: 15 MG/DL (ref 8–23)
CALCIUM SERPL-MCNC: 9.1 MG/DL (ref 8.5–9.9)
CHLORIDE BLD-SCNC: 92 MEQ/L (ref 95–107)
CO2: 30 MEQ/L (ref 20–31)
CREAT SERPL-MCNC: 3 MG/DL (ref 0.5–0.9)
CULTURE CATHETER TIP: NORMAL
EKG ATRIAL RATE: 74 BPM
EKG P AXIS: 7 DEGREES
EKG P-R INTERVAL: 230 MS
EKG Q-T INTERVAL: 456 MS
EKG QRS DURATION: 128 MS
EKG QTC CALCULATION (BAZETT): 506 MS
EKG R AXIS: -55 DEGREES
EKG T AXIS: 83 DEGREES
EKG VENTRICULAR RATE: 74 BPM
EOSINOPHILS ABSOLUTE: 0.3 K/UL (ref 0–0.7)
EOSINOPHILS RELATIVE PERCENT: 5.3 %
GFR AFRICAN AMERICAN: 19
GFR NON-AFRICAN AMERICAN: 15.7
GLOBULIN: 2.8 G/DL (ref 2.3–3.5)
GLUCOSE BLD-MCNC: 119 MG/DL (ref 70–99)
GLUCOSE BLD-MCNC: 133 MG/DL (ref 70–99)
GLUCOSE BLD-MCNC: 162 MG/DL (ref 70–99)
GLUCOSE BLD-MCNC: 178 MG/DL (ref 70–99)
GLUCOSE BLD-MCNC: 180 MG/DL (ref 70–99)
HCT VFR BLD CALC: 26.4 % (ref 37–47)
HEMOGLOBIN: 9.2 G/DL (ref 12–16)
LYMPHOCYTES ABSOLUTE: 1.4 K/UL (ref 1–4.8)
LYMPHOCYTES RELATIVE PERCENT: 24 %
MCH RBC QN AUTO: 32 PG (ref 27–31.3)
MCHC RBC AUTO-ENTMCNC: 34.7 % (ref 33–37)
MCV RBC AUTO: 92.2 FL (ref 82–100)
MONOCYTES ABSOLUTE: 0.7 K/UL (ref 0.2–0.8)
MONOCYTES RELATIVE PERCENT: 11.5 %
NEUTROPHILS ABSOLUTE: 3.5 K/UL (ref 1.4–6.5)
NEUTROPHILS RELATIVE PERCENT: 58.6 %
PDW BLD-RTO: 14.9 % (ref 11.5–14.5)
PERFORMED ON: ABNORMAL
PLATELET # BLD: 104 K/UL (ref 130–400)
POTASSIUM REFLEX MAGNESIUM: 4 MEQ/L (ref 3.4–4.9)
PROCALCITONIN: 0.74 NG/ML (ref 0–0.15)
RBC # BLD: 2.86 M/UL (ref 4.2–5.4)
SODIUM BLD-SCNC: 131 MEQ/L (ref 135–144)
TOTAL PROTEIN: 6.1 G/DL (ref 6.3–8)
WBC # BLD: 6 K/UL (ref 4.8–10.8)

## 2022-07-03 PROCEDURE — 99232 SBSQ HOSP IP/OBS MODERATE 35: CPT | Performed by: INTERNAL MEDICINE

## 2022-07-03 PROCEDURE — 1210000000 HC MED SURG R&B

## 2022-07-03 PROCEDURE — 36415 COLL VENOUS BLD VENIPUNCTURE: CPT

## 2022-07-03 PROCEDURE — 6370000000 HC RX 637 (ALT 250 FOR IP): Performed by: INTERNAL MEDICINE

## 2022-07-03 PROCEDURE — 6370000000 HC RX 637 (ALT 250 FOR IP): Performed by: NURSE PRACTITIONER

## 2022-07-03 PROCEDURE — 93010 ELECTROCARDIOGRAM REPORT: CPT | Performed by: INTERNAL MEDICINE

## 2022-07-03 PROCEDURE — 84145 PROCALCITONIN (PCT): CPT

## 2022-07-03 PROCEDURE — 99233 SBSQ HOSP IP/OBS HIGH 50: CPT | Performed by: PSYCHIATRY & NEUROLOGY

## 2022-07-03 PROCEDURE — 80053 COMPREHEN METABOLIC PANEL: CPT

## 2022-07-03 PROCEDURE — 97166 OT EVAL MOD COMPLEX 45 MIN: CPT

## 2022-07-03 PROCEDURE — 2700000000 HC OXYGEN THERAPY PER DAY

## 2022-07-03 PROCEDURE — 85025 COMPLETE CBC W/AUTO DIFF WBC: CPT

## 2022-07-03 RX ORDER — POLYETHYLENE GLYCOL 3350 17 G/17G
17 POWDER, FOR SOLUTION ORAL DAILY
Status: DISCONTINUED | OUTPATIENT
Start: 2022-07-03 | End: 2022-07-09 | Stop reason: HOSPADM

## 2022-07-03 RX ADMIN — SERTRALINE HYDROCHLORIDE 50 MG: 50 TABLET ORAL at 21:38

## 2022-07-03 RX ADMIN — Medication 10 MG: at 21:38

## 2022-07-03 RX ADMIN — ATORVASTATIN CALCIUM 40 MG: 40 TABLET, FILM COATED ORAL at 21:38

## 2022-07-03 RX ADMIN — MICONAZOLE NITRATE: 2 POWDER TOPICAL at 21:40

## 2022-07-03 RX ADMIN — INSULIN LISPRO 1 UNITS: 100 INJECTION, SOLUTION INTRAVENOUS; SUBCUTANEOUS at 22:33

## 2022-07-03 RX ADMIN — ASPIRIN 81 MG: 81 TABLET, COATED ORAL at 10:42

## 2022-07-03 RX ADMIN — INSULIN LISPRO 2 UNITS: 100 INJECTION, SOLUTION INTRAVENOUS; SUBCUTANEOUS at 12:42

## 2022-07-03 RX ADMIN — INSULIN LISPRO 2 UNITS: 100 INJECTION, SOLUTION INTRAVENOUS; SUBCUTANEOUS at 17:48

## 2022-07-03 RX ADMIN — ACETAMINOPHEN 325MG 650 MG: 325 TABLET ORAL at 17:53

## 2022-07-03 RX ADMIN — ARIPIPRAZOLE 5 MG: 5 TABLET ORAL at 10:56

## 2022-07-03 RX ADMIN — MICONAZOLE NITRATE: 2 POWDER TOPICAL at 10:48

## 2022-07-03 RX ADMIN — ISOSORBIDE MONONITRATE 120 MG: 60 TABLET, EXTENDED RELEASE ORAL at 10:42

## 2022-07-03 RX ADMIN — INSULIN GLARGINE 35 UNITS: 100 INJECTION, SOLUTION SUBCUTANEOUS at 22:33

## 2022-07-03 RX ADMIN — Medication 400 MG: at 10:42

## 2022-07-03 ASSESSMENT — PAIN SCALES - GENERAL
PAINLEVEL_OUTOF10: 0
PAINLEVEL_OUTOF10: 6

## 2022-07-03 ASSESSMENT — ENCOUNTER SYMPTOMS
TROUBLE SWALLOWING: 0
VOMITING: 0
CHOKING: 0
SHORTNESS OF BREATH: 0
SHORTNESS OF BREATH: 1
COLOR CHANGE: 0
NAUSEA: 0
GASTROINTESTINAL NEGATIVE: 1
COUGH: 0
PHOTOPHOBIA: 0
BACK PAIN: 1

## 2022-07-03 ASSESSMENT — PAIN DESCRIPTION - LOCATION: LOCATION: BACK

## 2022-07-03 NOTE — PROGRESS NOTES
Neurology Follow up    SUBJECTIVE: Complains of back pain and weakness in the lower extremities. Patient has MRSA bacteremia. Continues complain of back pain and difficulty with walking.     Current Facility-Administered Medications   Medication Dose Route Frequency Provider Last Rate Last Admin    insulin lispro (HUMALOG) injection vial 0-12 Units  0-12 Units SubCUTAneous TID WC Nicoletto Bolzan-Roche, APRN - CNP   2 Units at 07/02/22 1830    insulin lispro (HUMALOG) injection vial 0-6 Units  0-6 Units SubCUTAneous Nightly Nicoletto Bolzan-Roche, APRN - CNP   1 Units at 07/02/22 2108    lidocaine 4 % external patch 1 patch  1 patch TransDERmal Daily Mary Kay Primrose, DO   1 patch at 07/03/22 1042    heparin (porcine) injection 2,000 Units  2,000 Units IntraVENous Once [place] Marquez, DO        epoetin chadwick-epbx (RETACRIT) injection 10,000 Units  10,000 Units SubCUTAneous Once [place] Marquez,         vancomycin [facility]) intermittent dosing (placeholder)   Other RX Placeholder Mary Kay Primrose, DO        melatonin tablet 3 mg  3 mg Oral Nightly PRN Mary Kay Primrose, DO        acetaminophen (TYLENOL) tablet 650 mg  650 mg Oral Q6H PRN Mary Kay Primrose, DO   650 mg at 07/02/22 2106    Or    acetaminophen (TYLENOL) suppository 650 mg  650 mg Rectal Q6H PRN Mary Kay Primrose, DO   650 mg at 07/01/22 1354    prochlorperazine (COMPAZINE) injection 10 mg  10 mg IntraVENous TID PRN Mary Kay Primrose, DO   10 mg at 06/30/22 1507    albuterol (PROVENTIL) nebulizer solution 2.5 mg  2.5 mg Nebulization Q4H PRN Mary Kay Primrose, DO        ARIPiprazole (ABILIFY) tablet 5 mg  5 mg Oral Daily Mary Kay Primrose, DO   5 mg at 07/03/22 1056    aspirin EC tablet 81 mg  81 mg Oral Daily Roxy Hendrix, DO   81 mg at 07/03/22 1042    atorvastatin (LIPITOR) tablet 40 mg  40 mg Oral Nightly Mary Kay Primrose, DO   40 mg at 07/02/22 2106    hydrOXYzine HCl (ATARAX) tablet 25 mg  25 mg Oral TID PRN Mary Kay Primrose, DO        insulin glargine (LANTUS) injection vial 35 Units  35 Units SubCUTAneous Nightly Louetta Maier, DO   35 Units at 07/02/22 2108    isosorbide mononitrate (IMDUR) extended release tablet 120 mg  120 mg Oral Daily Louetta Maier, DO   120 mg at 07/03/22 1042    magnesium oxide (MAG-OX) tablet 400 mg  400 mg Oral Daily Louetta Maier, DO   400 mg at 07/03/22 1042    melatonin disintegrating tablet 10 mg  10 mg Oral Nightly Louetta Maier, DO   10 mg at 07/02/22 2106    midodrine (PROAMATINE) tablet 5 mg  5 mg Oral Once per day on Mon Wed Fri Roxy Hendrix DO        miconazole (MICOTIN) 2 % powder   Topical BID Louetta Maier, DO   Given at 07/03/22 1048    sertraline (ZOLOFT) tablet 50 mg  50 mg Oral Nightly Louetta Maier, DO   50 mg at 07/02/22 2106    glucose chewable tablet 16 g  4 tablet Oral PRN Louetta Maier, DO        dextrose bolus 10% 125 mL  125 mL IntraVENous PRN Louetta Maier, DO        Or    dextrose bolus 10% 250 mL  250 mL IntraVENous PRN Louetta Maier, DO        glucagon (rDNA) injection 1 mg  1 mg IntraMUSCular PRN Louetta Maier, DO        dextrose 5 % solution  100 mL/hr IntraVENous PRN Louetta Maier, DO           PHYSICAL EXAM:    BP (!) 109/46   Pulse 82   Temp 98.1 °F (36.7 °C) (Oral)   Resp 20   Ht 5' 7\" (1.702 m)   Wt 196 lb (88.9 kg)   LMP  (LMP Unknown)   SpO2 100%   BMI 30.70 kg/m²    General Appearance:      Skin:  normal  CVS - Normal sounds, No murmurs , No carotid Bruits  RS -CTA  Abdomen Soft, BS present  Review of Systems   Constitutional: Negative for fever. HENT: Negative for ear pain, tinnitus and trouble swallowing. Eyes: Negative for photophobia and visual disturbance. Respiratory: Negative for choking and shortness of breath. Cardiovascular: Negative for chest pain and palpitations. Gastrointestinal: Negative for nausea and vomiting. Musculoskeletal: Positive for arthralgias, back pain, gait problem and myalgias. Negative for joint swelling, neck pain and neck stiffness.    Skin: Negative for color change. Allergic/Immunologic: Negative for food allergies. Neurological: Positive for weakness. Negative for dizziness, tremors, seizures, syncope, facial asymmetry, speech difficulty, light-headedness, numbness and headaches. Psychiatric/Behavioral: Negative for behavioral problems, confusion, hallucinations and sleep disturbance.    Man review as noted  Mental Status Exam:             Level of Alertness:   awake            Orientation:   person, place, time                    Attention/Concentration:  normal            Language:  normal      Funduscopic Exam:     Cranial Nerves            Cranial nerve III           Pupils:  equal, round, reactive to light      Cranial nerves III, IV, VI           Extraocular Movements: intact      Cranial nerve V           Facial sensation:  intact      Cranial nerve VII           Facial strength: intact      Cranial nerve VIII           Hearing:  intact      Cranial nerve IX           Palate:  intact      Cranial nerve XI         Shoulder shrug:  intact      Cranial nerve XII          Tongue movement:  normal    Motor: No extremity strength of 4/ 5 with areflexia            Sensory:        Pinprick             Right Upper Extremity:  normal             Left Upper Extremity:  normal             Right Lower Extremity:  normal             Left Lower Extremity:  normal           Vibration                         Touch            Proprioception                 Coordination:           Finger/Nose   Right:  normal              Left:  normal          Heel-Knee-Shin                Right:  normal              Left:  normal          Rapid Alternating Movements              Right:  normal              Left:  normal          Gait:                       Casual: proximal muscle weakness is notable upon standing reflexes:             Deep Tendon Reflexes:             Reflexes are 2 +             Plantar response:                Right:  downgoing               Left: downgoing    Vascular:  Cardiac Exam:  normal         FL MODIFIED BARIUM SWALLOW W VIDEO    Result Date: 7/1/2022  Diandra Fishman MODIFIED BARIUM SWALLOW W VIDEO : 7/1/2022 CLINICAL HISTORY:  Choking with meal, concern for aspiration/dysphagia . COMPARISON: 5/10/2013. TECHNIQUE: Lateral videofluoroscopy was provided during speech therapy evaluation during ingestion of various barium liquids and semisolids. A total of 1.6 minutes of fluoroscopy time was used, with a total of  10 fluoroscopic series and one still saved. No diagnostic images were saved. Oral contrast:  50 mL BaSO4 FINDINGS: Mild to moderate oral dysphagia is observed with premature entry into the piriform sinuses and mild to moderate oral residual that cleared with independent re-swallowing. Endovaginal laryngeal penetration was observed with thin and soft barium consistencies that immediately cleared. Mild to moderate residual within the vallecula cleared with recent swallowing. No tracheal aspiration was identified. A full report will be made by the speech pathology team.     MILD TO MODERATELY ABNORMAL MODIFIED BARIUM SWALLOW. NO TRACHEAL ASPIRATION IDENTIFIED. A FULL REPORT WILL BE MADE BY THE SPEECH PATHOLOGY TEAM.       Recent Labs     07/01/22 0328 07/02/22  0547 07/03/22  0324   WBC 5.7 6.6 6.0   HGB 9.7* 9.9* 9.2*   PLT 80* 94* 104*     Recent Labs     07/01/22 0328 07/02/22  0548 07/03/22  0324    135 131*   K 4.8 4.3 4.0   CL 96 93* 92*   CO2 30 30 30   BUN 27* 25* 15   CREATININE 5.48* 4.50* 3.00*   GLUCOSE 151* 140* 119*     Recent Labs     07/01/22 0328 07/02/22  0548 07/03/22  0324   BILITOT 1.3* 1.3* 0.9*   ALKPHOS 126 124 114   AST 28 20 16   ALT 23 19 15     Lab Results   Component Value Date/Time    PROTIME 16.3 06/30/2022 07:30 AM    INR 1.3 06/30/2022 07:30 AM     No results found for: LITHIUM, DILFRTOT, VALPROATE    ASSESSMENT AND PLAN  With ataxia with falls with lower extremity weakness. A lumbar canal stenosis must be ruled out. Recommend an MRI of the lumbosacral spine and the brain given that she has underlying bacteremia to make sure this is not endocarditis. She also has peripheral neuropathy to explain her lower extremity weakness as well had findings discussed. Exam as noted above. Patient has some ataxia and lower extremity weakness. We had recommended a lumbar spine evaluation with an MRI with an MRI I of the brain to make sure there is no septic emboli given underlying blood cultures. MRI is pending. Complain of knee pain which may be causing some degree with difficult ambulation. Darren Levy MD, Rika Servin, American Board of Psychiatry & Neurology  Board Certified in Vascular Neurology  Board Certified in Neuromuscular Medicine  Certified in . [place] [address]

## 2022-07-03 NOTE — PROGRESS NOTES
Chief Complaint   Patient presents with    Back Pain       since fall last night from standing          Patient is a 57 y.o. female who presents with a chief complaint of fall. Patient is followed on a regular basis by Dr. Marychuy Palmer MD.  Patient with past medical history of Crohn disease status post CABG/PCI, tricuspid valve repair, chronic congestive heart failure, end-stage renal disease hemodialysis dependent, hypertension, hyperlipidemia, diabetes mellitus type 2. Apparently patient on admission she was noted to have bacteremia with MRSA as well as Enterococcus faecalis. We are asked to rule out endocarditis. Patient is status post removal of temporary hemodialysis catheter. Status post recent right upper extremity fistula thrombectomy. She denies any chest pain or shortness of breath at this time       7/3/2022 no new events overnight. Resting comfortably     CV Data:  6/2020 Echo EF 60  Mild mR  RVSP 46   7/2020 LAD DEWEY. She has prior stents as well.   8/21 Echo EF 60  8/2021 LAD recurrent ISR with double layer stents. Went to [facility] and had redo stent.    12/21 CUS B/l ICA 50-69  12/21 Spec tnegative  1/12/22 Cath LAD IST   1/2022 CABG LIMA - LAD + TV Repair complicated by Tamponade and pericardial window.   4/25/22 eCHO ef 60 NO pe rvsp 68        Past Medical History        Past Medical History:   Diagnosis Date    Angina at rest [facility]) 5/24/2020    Anxiety      CAD S/P percutaneous coronary angioplasty 2015, 2018     stents per dr Belen Willett    CHF (congestive heart failure) ([address]      CKD (chronic kidney disease) stage 4, GFR 15-29 ml/min ([address] 2/24/2018    CKD stage 4 due to type 2 diabetes mellitus ([address]      Contusion of right chest wall 2/16/2021    COPD (chronic obstructive pulmonary disease) ([address]      Diabetic nephropathy with proteinuria ([address] 2014    DJD (degenerative joint disease) of knee       Dr Bob Macias GERD (gastroesophageal reflux disease)      Hemiparesis, left ([address] 2013     entered Assisted Living ([facility])    Hemodialysis patient [facility])      Hemodialysis-associated hypotension 10/22/2021    History of heart failure      History of seizures      History of type C viral hepatitis      HTN (hypertension)      Hyperlipidemia      Impaired mobility and activities of daily living      Mediastinal lymphadenopathy 2013     Dr Gates Burkitt, Albin Clarke    Metabolic syndrome      Moderate persistent asthma without complication 2/93/8920    Need for extended care facility 7/7/2021    Neurogenic urinary incontinence 2013    Neuropathy in diabetes [facility])      Nonrheumatic mitral valve regurgitation 7/7/2021    Nonrheumatic tricuspid valve regurgitation 7/7/2021    Obesity (BMI 30-39. 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 ([address]      Type 2 diabetes mellitus with renal manifestations, controlled ([address] 2015     Insulin dependent, Dr Jules Rain    Uncontrolled type 2 diabetes mellitus with hyperglycemia ([address]      Urinary incontinence due to cognitive impairment 2013    Vitamin D deficiency 2014             Patient Active Problem List   Diagnosis    Atherosclerotic heart disease of native coronary artery with unspecified angina pectoris (HCC)    Schizophrenia, paranoid, chronic    Metabolic syndrome    Vitamin B 12 deficiency    Cerebral microvascular disease    Mixed hyperlipidemia    Other hammer toe (acquired)    Vitamin D insufficiency    Incontinence of urine    Diabetic nephropathy with proteinuria ([address]    Essential (primary) hypertension    History of type C viral hepatitis    Urinary incontinence due to cognitive impairment    History of seizures    Stented coronary artery-plan is to stay on Plavix indefinately per Dr Sharyn Garcia Other specified diabetes mellitus with diabetic neuropathy, unspecified ([facility]    Controlled type 2 diabetes mellitus with diabetic neuropathy, with long-term current use of insulin ([facility])    Hemiparesis, left ([facility])    Angina, class II ([address]    Pain, unspecified    Tardive dyskinesia    Shortness of breath    Chronic diastolic congestive heart failure ([facility])    Sleep apnea, unspecified    Pulmonary hypertension, unspecified ([facility])    Class 2 severe obesity with serious comorbidity and body mass index (BMI) of 36.0 to 36.9 in adult ([facility])    Edema    Closed supracondylar fracture of right humerus    Other chronic pain    Palliative care patient    Recurrent falls    Renal failure    Difficulty in walking    ESRD (end stage renal disease) on dialysis ([facility])    Weakness    Other seizures ([facility])    Moderate persistent asthma without complication    AFKCQ-05    Post PTCA    Falls frequently    Chest wall contusion, left, initial encounter    Headache, unspecified    Paranoid schizophrenia ([facility]    Compression fracture of spine ([facility])    Closed rib fracture    Depression    Chronic obstructive pulmonary disease ([facility])    Critical illness polyneuropathy ([facility]    Multiple closed fractures of ribs of right side    Nonrheumatic mitral valve regurgitation    Nonrheumatic tricuspid valve regurgitation    Need for extended care facility    Chronic pain of both shoulders    Hemodialysis-associated hypotension    Anginal chest pain at rest [facility])    Chest pain    Unstable angina ([facility])    NSTEMI (non-ST elevated myocardial infarction) ([facility])    CKD (chronic kidney disease) stage 4, GFR 15-29 ml/min ([facility])    Hyperkalemia    Impaired mobility and activities of daily living    Dialysis patient [facility])    Unspecified open wound of right upper arm, initial encounter    Multiple closed fractures of right lower extremity and ribs    Closed T11 fracture (HCC)    Encephalopathy acute    MRSA bacteremia    Sepsis due to Enterococcus ([address]    Local infection due to central venous catheter         Past Surgical History         Past Surgical History:   Procedure Laterality Date     SECTION         x1    COLONOSCOPY   2014     Dr. Anitra Miramontes x1 Dr. Nelson River, Dr Yunior Trevino 2018    [place]   08/10/2021     [facility] [place]    DIAGNOSTIC CARDIAC CATH LAB PROCEDURE   10/02/2019    DIALYSIS CATHETER INSERTION Left 2022     Tunneled Symetrex 15.5F x 23cm hemodialysis catheter inserted by Dr. Faheem Ho, TOTAL ABDOMINAL (CERVIX REMOVED)         one ovary intact, Dr Jesus Lauren, menorrhagia    IR THROMBECTOMY PERCUT AVF   2022     IR THROMBECTOMY PERCUT AVF 2022 [facility] SPECIAL PROCEDURE    IR TUNNELED CATHETER PLACEMENT GREATER THAN 5 YEARS   6/3/2022     IR TUNNELED CATHETER PLACEMENT GREATER THAN 5 YEARS 6/3/2022 [facility] SPECIAL PROCEDURE    ME TOTAL KNEE ARTHROPLASTY Left 2018     LEFT KNEE TOTAL KNEE ARTHROPLASTY, SHAYNA, NERVE BLOCK performed by Jozef Bernstein MD at [address] [place] [address] PTCA        TOE AMPUTATION Right      TOTAL KNEE ARTHROPLASTY   2016     Dr Tegan Bliss TUNNELED [address] Right 2020     tunneled HD catheter per Dr Eufemia Garcia History   Social History            Socioeconomic History    Marital status:        Spouse name: None    Number of children: 2    Years of education: None    Highest education level: None   Occupational History    Occupation: disabled   Tobacco Use    Smoking status: Never Smoker    Smokeless tobacco: Never Used   Vaping Use    Vaping Use: Never used   Substance and Sexual Activity    Alcohol use:  No       Alcohol/week: 0.0 standard drinks    Drug use: No    Sexual activity: Not Currently   Other Topics Concern    None   Social History Narrative     Disabled, lives in [address] [place] close by      Born in [place], one of 5     Twin sister Reyes, very ill in 2018, [place] [address]     Moved to [facility] [place], , 2 children, one son and one daughter     Worked at [employer], as a nurse's aide     Disabled due to mental illness     Lived at [employer], was discharged, returned to independent living in 2017 in the daughter's house and has adjusted well     One son and one daughter, live in the same house with patient, [employer] pays the rent     [employer] reading ([place])                 10/11/2021 [facility] updates; patient lives with her daughter son-in-law and 2 grandchildren and patient's handicapped son. Per daughter, her brother is blind, MRDD, multiple health issues. Daughter's  is patient's legal guardian.     Patient has hemodialysis Tuesday Thursday and Saturday. Patient's bedroom is on main floor with a half bath. Daughter walks patient upstairs once weekly for full bath. Patient is using her walker in the home. Patient has a hospital bed in the home.      Social Determinants of Health          Financial Resource Strain: Low Risk     Difficulty of Paying Living Expenses: Not hard at all   Food Insecurity: No Food Insecurity    Worried About Running Out of Food in the Last Year: Never true    Yung of Food in the Last Year: Never true   Transportation Needs: No Transportation Needs    Lack of Transportation (Medical): No    Lack of Transportation (Non-Medical):  No   Physical Activity: Sufficiently Active    Days of Exercise per Week: 3 days    Minutes of Exercise per Session: 60 min   Stress:     Feeling of Stress : Not on file   Social Connections:     Frequency of Communication with Friends and Family: Not on file    Frequency of Social Gatherings with Friends and Family: Not on file    Attends Buddhist Services: Not on file   [facility] [employer] or Organizations: Not on file    Attends Club or Organization Meetings: Not on file    Marital Status: Not on file   Intimate Partner Violence:     Fear of Current or Ex-Partner: Not on file    Emotionally Abused: Not on file    Physically Abused: Not on file    Sexually Abused: Not on file   Housing Stability:     Unable to Pay for Housing in the Last Year: Not on file    Number of [place] in the Last Year: Not on file    Unstable Housing in the Last Year: Not on file            Family History         Family History   Problem Relation Age of Onset    Cancer Mother 76         survived    Breast Cancer Mother      Hypertension Father      Diabetes Sister      Mental Illness Sister      Colon Cancer Neg Hx              Current Facility-Administered Medications             Current Facility-Administered Medications   Medication Dose Route Frequency Provider Last Rate Last Admin    insulin lispro (HUMALOG) injection vial 0-12 Units  0-12 Units SubCUTAneous TID WC Nicoletto Bolzan-Roche, APRN - CNP   2 Units at 07/02/22 0907    insulin lispro (HUMALOG) injection vial 0-6 Units  0-6 Units SubCUTAneous Nightly Nicoletto Bolzan-Roche, APRN - CNP        lidocaine 4 % external patch 1 patch  1 patch TransDERmal Daily Corena Old, DO   1 patch at 07/02/22 1117    LORazepam (ATIVAN) injection 1 mg  1 mg IntraVENous Once PRN Azalea Connolly MD        heparin (porcine) injection 2,000 Units  2,000 Units IntraVENous Once Rita Reyes,         epoetin chadwick-epbx (RETACRIT) injection 10,000 Units  10,000 Units SubCUTAneous Once Rita Reyes, DO        vancomycin (VANCOCIN) intermittent dosing (placeholder)   Other RX Placeholder Roxy Hooper, DO        melatonin tablet 3 mg  3 mg Oral Nightly PRN Corena Old, DO        acetaminophen (TYLENOL) tablet 650 mg  650 mg Oral Q6H PRN Corena Old, DO   650 mg at 07/02/22 0017     Or    acetaminophen (TYLENOL) suppository 650 mg  650 mg Rectal Q6H PRN Donnamaria Malady, DO   650 mg at 07/01/22 1354    prochlorperazine (COMPAZINE) injection 10 mg  10 mg IntraVENous TID PRN Donnamaria Malady, DO   10 mg at 06/30/22 1507    albuterol (PROVENTIL) nebulizer solution 2.5 mg  2.5 mg Nebulization Q4H PRN Donnamaria Malady, DO        ARIPiprazole (ABILIFY) tablet 5 mg  5 mg Oral Daily Donnamaria Malady, DO   5 mg at 07/02/22 0906    aspirin EC tablet 81 mg  81 mg Oral Daily Donnamaria Malady, DO   81 mg at 07/02/22 4854    atorvastatin (LIPITOR) tablet 40 mg  40 mg Oral Nightly Donnamaria Malady, DO   40 mg at 07/01/22 2102    hydrOXYzine HCl (ATARAX) tablet 25 mg  25 mg Oral TID PRN Donnamaria Malady, DO        insulin glargine (LANTUS) injection vial 35 Units  35 Units SubCUTAneous Nightly Donnamaria Malady, DO   35 Units at 07/01/22 2103    isosorbide mononitrate (IMDUR) extended release tablet 120 mg  120 mg Oral Daily Donnamaria Malady, DO   120 mg at 07/02/22 3588    magnesium oxide (MAG-OX) tablet 400 mg  400 mg Oral Daily Donnamaria Malady, DO   400 mg at 07/02/22 3728    melatonin disintegrating tablet 10 mg  10 mg Oral Nightly Donnamaria Malady, DO   10 mg at 07/01/22 2102    midodrine (PROAMATINE) tablet 5 mg  5 mg Oral Once per day on Mon Wed Fri Roxy Hendrix, DO        miconazole (MICOTIN) 2 % powder   Topical BID Donnamaria Malady, DO   Given at 07/02/22 2506    sertraline (ZOLOFT) tablet 50 mg  50 mg Oral Nightly Donnamaria Malady, DO   50 mg at 07/01/22 2102    glucose chewable tablet 16 g  4 tablet Oral PRN Donnamaria Malady, DO        dextrose bolus 10% 125 mL  125 mL IntraVENous PRN Donnamaria Malady, DO         Or    dextrose bolus 10% 250 mL  250 mL IntraVENous PRN Donnamaria Malady, DO        glucagon (rDNA) injection 1 mg  1 mg IntraMUSCular PRN Donnamaria Malady, DO        dextrose 5 % solution  100 mL/hr IntraVENous PRN Odalis Wolf, DO                ALLERGIES: Codeine and Oxycontin [oxycodone hcl]     Review of Systems   Constitutional: Negative. Negative for chills and fever. HENT: Negative. Eyes: Negative. Respiratory: Negative for shortness of breath and wheezing. Cardiovascular: Negative for chest pain, palpitations and leg swelling. Gastrointestinal: Negative. Negative for abdominal pain, nausea and vomiting. Endocrine: Negative. Genitourinary: Negative. Musculoskeletal: Negative. Skin: Negative. Negative for rash. Allergic/Immunologic: Negative. Neurological: Negative for dizziness, weakness and headaches. Hematological: Negative. Psychiatric/Behavioral: Negative.             VITALS:  Blood pressure (!) 106/58, pulse 69, temperature 97.7 °F (36.5 °C), temperature source Oral, resp. rate 16, height 5' 7\" (1.702 m), weight 200 lb 9.6 oz (91 kg), SpO2 100 %, not currently breastfeeding. Body mass index is 31.42 kg/m².     Physical Exam  Constitutional:       Appearance: She is well-developed. She is not diaphoretic. HENT:      Head: Normocephalic and atraumatic. Eyes:      Pupils: Pupils are equal, round, and reactive to light. Neck:      Thyroid: No thyromegaly. Vascular: No JVD. Trachea: No tracheal deviation. Cardiovascular:      Rate and Rhythm: Normal rate and regular rhythm. Chest Wall: PMI is not displaced. Pulses: Intact distal pulses. Heart sounds: Normal heart sounds. Heart sounds not distant. No murmur heard. No friction rub. No gallop. No S3 sounds. Pulmonary:      Effort: No respiratory distress. Breath sounds: No wheezing or rales. Chest:      Chest wall: No tenderness. Abdominal:      General: Bowel sounds are normal. There is no distension. Palpations: Abdomen is soft. There is no mass. Tenderness: There is no abdominal tenderness. There is no guarding or rebound. Musculoskeletal:      Cervical back: Normal range of motion and neck supple. Skin:     General: Skin is warm and dry. Coloration: Skin is not pale.       Findings: No erythema or rash.   Neurological:      Mental Status: She is alert and oriented to person, place, and time. Cranial Nerves: No cranial nerve deficit. Psychiatric:         Behavior: Behavior normal.         Thought Content:  Thought content normal.         Judgment: Judgment normal.            LABS:  Recent Results         Recent Results (from the past 24 hour(s))   Sedimentation Rate     Collection Time: 07/01/22  2:51 PM   Result Value Ref Range     Sed Rate 48 (H) 0 - 30 mm   POCT Glucose     Collection Time: 07/01/22  8:43 PM   Result Value Ref Range     POC Glucose 233 (H) 70 - 99 mg/dl     Performed on ACCU-CHEK     CBC with Auto Differential     Collection Time: 07/02/22  5:47 AM   Result Value Ref Range     WBC 6.6 4.8 - 10.8 K/uL     RBC 3.06 (L) 4.20 - 5.40 M/uL     Hemoglobin 9.9 (L) 12.0 - 16.0 g/dL     Hematocrit 28.9 (L) 37.0 - 47.0 %     MCV 94.3 82.0 - 100.0 fL     MCH 32.5 (H) 27.0 - 31.3 pg     MCHC 34.4 33.0 - 37.0 %     RDW 15.3 (H) 11.5 - 14.5 %     Platelets 94 (L) 420 - 400 K/uL     Neutrophils % 70.1 %     Lymphocytes % 15.1 %     Monocytes % 10.8 %     Eosinophils % 3.4 %     Basophils % 0.6 %     Neutrophils Absolute 4.6 1.4 - 6.5 K/uL     Lymphocytes Absolute 1.0 1.0 - 4.8 K/uL     Monocytes Absolute 0.7 0.2 - 0.8 K/uL     Eosinophils Absolute 0.2 0.0 - 0.7 K/uL     Basophils Absolute 0.0 0.0 - 0.2 K/uL   Comprehensive Metabolic Panel w/ Reflex to MG     Collection Time: 07/02/22  5:48 AM   Result Value Ref Range     Sodium 135 135 - 144 mEq/L     Potassium reflex Magnesium 4.3 3.4 - 4.9 mEq/L     Chloride 93 (L) 95 - 107 mEq/L     CO2 30 20 - 31 mEq/L     Anion Gap 12 9 - 15 mEq/L     Glucose 140 (H) 70 - 99 mg/dL     BUN 25 (H) 8 - 23 mg/dL     CREATININE 4.50 (H) 0.50 - 0.90 mg/dL     GFR Non- 9.8 (L) >60     GFR  11.9 (L) >60     Calcium 9.3 8.5 - 9.9 mg/dL     Total Protein 6.4 6.3 - 8.0 g/dL     Albumin 3.5 3.5 - 4.6 g/dL     Total Bilirubin 1.3 (H) 0.2 - 0.7 mg/dL     Alkaline Phosphatase 124 40 - 130 U/L     ALT 19 0 - 33 U/L     AST 20 0 - 35 U/L     Globulin 2.9 2.3 - 3.5 g/dL   Vancomycin Level, Random     Collection Time: 07/02/22  5:48 AM   Result Value Ref Range     Vancomycin Rm 14.9 10.0 - 40.0 ug/mL   POCT Glucose     Collection Time: 07/02/22  7:48 AM   Result Value Ref Range     POC Glucose 152 (H) 70 - 99 mg/dl     Performed on ACCU-CHEK           Troponin:         Lab Results   Component Value Date/Time     TROPONINI 0.037 06/30/2022 07:30 AM               ASSESSMENT:     Bacteremia-rule out endocarditis  History of CAD status post multivessel PCI/CABG  History of tricuspid valve repair  History of postsurgical tamponade status post pericardial window  Diabetes mellitus  End-stage renal disease hemodialysis dependent  Essential hypertension  Hyperlipidemia  Elevated troponin-demand ischemia  Anemia  Multiple medical problem     PLAN:   1. As always, aggressive risk factor modification is strongly recommended. We should adhere to the JNC VIII guidelines for HTN management and the NCEPATP III guidelines for LDL-C management. 2. We will plan for NOELLE on Monday to rule out endocarditis. Patient is clinically stable at this time. No evidence of any complete heart block, CVA type symptoms. Patient is agreeable to proceed  3. Maximize cardiac medications  4. Nephrology recommendations  5. Monitor on telemetry  6. Maintain test between 3.5-4.5 and magnesium greater than 2  7. Monitor H&H closely  8. GI/DVT prophylaxis        Thank you for allowing me to participate in the care of your patient, please don't hesitate to contact me if you have any further questions.

## 2022-07-03 NOTE — CARE COORDINATION
Patient not medically cleared for d/c. Patient d/c plans are to go to [facility] once accepted. CM to continue to follow for d/c readiness. Patient was at home with [facility], if patient returns home will need a resumption of care order. Patient plans to get NOELLE on Tuesday, 7/5, to r/o endocarditis. CM to continue to follow patient for any new d/c needs. Spoke with [facility] and they are able to accept the patient whenever she is medically cleared.

## 2022-07-03 NOTE — PROGRESS NOTES
Progress Note  Charlene Hansen       [id number]  Patient Name:Michelle Joseph     YOB: 1960     Age:62 y.o. Subjective      Underwent 2 L HD yesterday, tolerated well. Afebrile overnight, WBCs WNL despite bacteremia.  1 L O2 by NC, no significant dyspnea. Patient's only current complaint is minced/moist diet texture per SLP recommendations. No other new/acute complaints at present. Objective         Vitals Last 24 Hours:  TEMPERATURE:  Temp  Av °F (36.7 °C)  Min: 97.5 °F (36.4 °C)  Max: 98.5 °F (36.9 °C)  RESPIRATIONS RANGE: Resp  Av  Min: 16  Max: 16  PULSE OXIMETRY RANGE: SpO2  Av %  Min: 96 %  Max: 100 %  PULSE RANGE: Pulse  Av.8  Min: 63  Max: 81  BLOOD PRESSURE RANGE: Systolic (70FJY), MJS:796 , Min:101 , GSM:854   ; Diastolic (70HPS), YKC:83, Min:46, Max:78    I/O (24Hr): Intake/Output Summary (Last 24 hours) at 7/3/2022 0817  Last data filed at 2022 2254  Gross per 24 hour   Intake 1080 ml   Output 2575 ml   Net -1495 ml       PHYSICAL EXAM:   Constitutional: Obese elderly adult female reclining in bed in no acute distress  Head: NCAT  Eyes: PERRLA, EOMI  Neck: Trachea midline, phonation normal  Cardiovascular: RRR. No significant murmurs appreciated. 1+ bilateral pretibial edema. RUE AV fistula site noted. Pulmonary: Normal rate and effort of respiration on room air. Distant lung sounds secondary to habitus, however no significant adventitious lung sounds appreciated. No coughing during exam.  Abdomen: Obese, soft, nontense. Hypoactive bowel sounds. Grossly nontender to palpation. Neurologic: Alert, grossly oriented. Intermittent mild confusion. Follows all commands. Nonfocal.  Psychiatric: Pleasant and cooperative. MSK/integumentary: Right posterior costal cage tenderness, without significant bony displacement noted.       Labs/Imaging/Diagnostics    Labs:  CBC:  Recent Labs     22  0328 22  0547 22  0324   WBC 5.7 6.6 6.0   RBC 3.03* 3.06* 2.86*   HGB 9.7* 9.9* 9.2*   HCT 28.4* 28.9* 26.4*   MCV 93.9 94.3 92.2   RDW 15.2* 15.3* 14.9*   PLT 80* 94* 104*     CHEMISTRIES:  Recent Labs     07/01/22  0328 07/02/22  0548 07/03/22  0324    135 131*   K 4.8 4.3 4.0   CL 96 93* 92*   CO2 30 30 30   BUN 27* 25* 15   CREATININE 5.48* 4.50* 3.00*   GLUCOSE 151* 140* 119*     PT/INR:  No results for input(s): PROTIME, INR in the last 72 hours. APTT:  No results for input(s): APTT in the last 72 hours. LIVER PROFILE:  Recent Labs     07/01/22  0328 07/02/22  0548 07/03/22  0324   AST 28 20 16   ALT 23 19 15   BILITOT 1.3* 1.3* 0.9*   ALKPHOS 126 124 114       Imaging Last 24 Hours:  CT ABDOMEN PELVIS WO CONTRAST Additional Contrast? None    Result Date: 6/30/2022  CT ABDOMEN PELVIS WO CONTRAST: 6/30/2022 CLINICAL HISTORY:  severe low back and right flank pain after fall; assess for retroperitoneal hematoma . COMPARISON: 4/20/2022. TECHNIQUE: Spiral images were obtained of the abdomen and pelvis without contrast. All CT scans at this facility use dose modulation, iterative reconstruction, and/or weight based dosing when appropriate to reduce radiation dose to as low as reasonably achievable. FINDINGS: Minimally displaced acute fractures of the posterior right 10th and 11th ribs are noted. A small right pleural effusion has mildly decreased in size from the prior study. There is no organized hematoma, other displaced fractures, evidence of solid organ injury (within the limits of a noncontrast study), or other significant changes from 4/20/2022 identified. A small to moderate-sized left pleural effusion, mild probable scarring and/or atelectasis of the visualized lung bases, a moderate compression fracture of T11, and other previously described chronic findings appear unchanged. MINIMALLY DISPLACED ACUTE FRACTURES OF THE POSTERIOR RIGHT 10TH AND 11TH RIBS. NO OTHER SIGNIFICANT RECENT POSTTRAUMATIC COMPLICATION IDENTIFIED.  CHRONIC FINDINGS, NOT SIGNIFICANTLY CHANGED FROM 4/20/2022. XR CHEST (2 VW)    Result Date: 6/30/2022  EXAMINATION:  CHEST. CLINICAL HISTORY:  CONFUSION. COMPARISONS:  6/2/2022. TECHNIQUE:  AP and lateral. FINDINGS: There is mild to moderate cardiomegaly. No definite evidence of pulmonary venous congestion. Chronic changes are noted in the left lower thorax. There is a dialysis catheter in place. There is a valve prosthesis in place. There are small pleural effusions or blunting of both costophrenic angles. NO SIGNIFICANT CHANGE SINCE THE PREVIOUS EXAM. THERE HAS BEEN PLACEMENT OF A DIALYSIS CATHETER SINCE THAT EXAM.     CT HEAD WO CONTRAST    Result Date: 6/30/2022  CT HEAD WO CONTRAST : 6/30/2022 CLINICAL HISTORY:  fall from standing last night confused at dialysis c/o back pain . COMPARISON: 10/11/2021. TECHNIQUE: Spiral unenhanced images were obtained of the head, with routine multiplanar reconstructions performed. All CT scans at this facility use dose modulation, iterative reconstruction, and/or weight based dosing when appropriate to reduce radiation dose to as low as reasonably achievable. FINDINGS: There is no intracranial hemorrhage, mass effect, midline shift, extra-axial collection, evidence of hydrocephalus, recent ischemic infarct, or skull fracture identified. Chronic volume loss and mild mucosal thickening is again noted within the maxillary sinuses. The mastoid air cells and other visualized paranasal sinuses are essentially clear. NO ACUTE INTRACRANIAL PROCESS OR SIGNIFICANT CHANGE FROM 10/11/2021 IDENTIFIED. CT CHEST WO CONTRAST    Result Date: 6/30/2022  CT CHEST WO CONTRAST: 6/30/2022 CLINICAL HISTORY:  fall; rib fractures . COMPARISON: CT abdomen pelvis from earlier 6/30/2022, and chest CTA 1/12/2022.  TECHNIQUE: Spiral imaging was obtained of the chest without contrast. All CT scans at this facility use dose modulation, iterative reconstruction, and/or weight based dosing when seen. There is mild to moderate degenerative arthritis. We note a small pleural effusion on the right and a moderate sized effusion on the left. OLD COMPRESSION FRACTURE OF T11. AN ACUTE FRACTURE IS NOT SEEN. BILATERAL PLEURAL EFFUSIONS. CT LUMBAR SPINE WO CONTRAST    Result Date: 6/30/2022  EXAMINATION:  CT LUMBAR SPINE. CLINICAL HISTORY:  TRAUMA. COMPARISONS:  NONE AVAILABLE TECHNIQUE:  Serial 2.5 mm scans. No contrast. FINDINGS:  Vertebral bodies are in fairly normal alignment. Interspaces are fairly well maintained. There is mild degenerative arthritis. No fracture is seen. MILD DEGENERATIVE ARTHRITIS. NO FRACTURE. XR CHEST PORTABLE    Result Date: 6/30/2022  COMPARISON: No to 30 of 2022. HISTORY: SOB TECHNIQUE: AP view FINDINGS: No consolidating pneumonia or pneumothorax is seen. The costophrenic angles again appear blunted. The cardiac silhouette is enlarged. There is again evidence of median sternotomy and valve replacement. A central venous catheter is again seen in place and  unchanged in position. . There are appear to be healed rib fractures in the left hemithorax. Degenerative changes are seen in the spine and visualized right shoulder. Findings may represent mild CHF. It is not significantly change from previous study. Assessment//Plan           Hospital Problems           Last Modified POA    * (Principal) MRSA bacteremia 7/1/2022 Yes    Impaired mobility and activities of daily living 6/30/2022 Yes    Closed T11 fracture ([address] 6/30/2022 Yes    Encephalopathy acute 6/30/2022 Yes    Sepsis due to Enterococcus ([address] 7/1/2022 Yes    Local infection due to central venous catheter 7/1/2022 Yes    Chronic diastolic congestive heart failure ([address] (Chronic) 6/30/2022 Yes    Closed rib fracture 6/30/2022 Yes    Dialysis patient ([address] 6/30/2022 Yes    Overview Signed 6/30/2022 11:26 AM by Moira Morales DO     Patient has hemodialysis Tuesday Thursday and Saturday.           Multiple closed fractures of right lower extremity and ribs 6/30/2022 Yes    Overview Signed 6/30/2022 12:09 PM by Kimberlee Lackey, DO     MINIMALLY DISPLACED ACUTE FRACTURES OF THE POSTERIOR RIGHT 10TH AND 11TH RIBS. Assessment & Plan    Active problems:  · Weakness, fall -with history of multiple prior falls  · Right posterior 10th and 11th rib fractures, minimally displaced, with associated back pain  · Confusion/AMS in setting of above     Chronic problems:  · ESRD on HD (most recent treatment not completed)  · DM2 with neuropathy and nephropathy (50 units Lantus nightly and high correction sliding scale plus supplemental short acting with meals at home)  · Obesity  · Chronic anemia  · Hx seizures  · Hx CVA with baseline left-sided weakness  · Reported Hx tardive dyskinesia  · [place]  · Pulmonary hypertension  · Obesity  · Paranoid schizophrenia  · Moderate persistent asthma  · CAD s/p stenting and CABG  · Chronically elevated troponin, at baseline level  · Chronically elevated INR, baseline level  · Severe malnutrition (per Dietician)        Plan:  · Place in observation status  · Nephrology consulted for hemodialysis management  · Acute inpatient rehabilitation consult for inpatient rehab appropriateness  · PT/OT consult for evaluation and treatment  · Fall precautions  · N.p.o. except sips/chips and selected meds, s/p choking event with first post-admission meal, pending SLP swallowing evaluation and modified barium swallow test  · When p.o. intake resumed: Adult cardiac diabetic renal diet (plus any modifications per ST recommendations)  · Lantus 35 units nightly, with medium sliding scale every 4 hours while NPO, transition to [facility] 3 times daily and nightly when p.o. intake resumed. · Heparin TID for DVT prophylaxis  · Continue/hold home medications as ordered after medication history completed. 7/1: 2 of 2 BCx newly demonstrating MRSA bacteremia.   Urgently started on loading dose vancomycin, dosing per pharmacy. Nephrology notified. Hemodialysis abbreviated, patient received vancomycin loading dose during third hour of hemodialysis. Infectious disease consult placed. (Patient away from room during multiple trips to the floor. Will see next day. Case actively managed throughout the day, including review of VS and new results, discussions with consultants assisting with management of the case, and new orders as appropriate.)  7/2: Left IJ hemodialysis tunneled catheter removed late 7/1. Patient tolerated procedure well, no immediate complications. Continues on vancomycin. Infectious disease consulted, managing antibiotic regimen. Blood PCR testing revealing presence of MRSA, staph epidermidis, and Enterococcus faecalis. Patient clinically does not appear septic, feels relatively well, no acute complaints at present. WBC 6.6 today. Afebrile overnight, but T-max 100.0 yesterday afternoon. Changed to inpatient status. Neurology planning likely L-spine and brain MRI. Cardiology consulted at infectious disease services recommendation for endocarditis evaluation.     Electronically signed by Naa Noriega DO on 7/3/2022 at 8:17 AM

## 2022-07-03 NOTE — PROGRESS NOTES
[facility] OCCUPATIONAL THERAPY EVALUATION - ACUTE     NAME: Mirta Santiago  : 1960 (54 y.o.)  MRN: [id number]  CODE STATUS: Full Code  Room: Z1/S335-65    Date of Service: 7/3/2022    Patient Diagnosis(es): Weakness [R53.1]  Physical deconditioning [R53.81]  Bilateral pleural effusion [J90]  At high risk for injury related to fall [Z91.81]  Concussion without loss of consciousness, initial encounter [S06.0X0A]  Fall from standing, initial encounter [W19. XXXA]  Chronic renal failure, stage 5 (HCC) [N18.5]  Closed fracture of multiple ribs of right side, initial encounter [S22.41XA]  Class 1 obesity due to excess calories with serious comorbidity and body mass index (BMI) of 33.0 to 33.9 in adult [E66.09, Z68.33]  MRSA bacteremia [R78.81, B95.62]   Patient Active Problem List    Diagnosis Date Noted    Unstable angina ([address] 2022    Chest pain     MRSA bacteremia 2022    Sepsis due to Enterococcus ([address]     Local infection due to central venous catheter     Impaired mobility and activities of daily living 2022    Closed T11 fracture ([address] 2022    Encephalopathy acute 2022    Unspecified open wound of right upper arm, initial encounter 2022    Hyperkalemia 2022    CKD (chronic kidney disease) stage 4, GFR 15-29 ml/min ([facility] 2022    Dialysis patient ([address] 2022    Multiple closed fractures of right lower extremity and ribs 2022    NSTEMI (non-ST elevated myocardial infarction) ([facility] 2022    Anginal chest pain at rest [facility]) 2022    Hemodialysis-associated hypotension 10/22/2021    Chronic pain of both shoulders 2021    Nonrheumatic mitral valve regurgitation 2021    Nonrheumatic tricuspid valve regurgitation 2021    Need for extended care facility 2021    Multiple closed fractures of ribs of right side 2021    Depression 2021    Chronic obstructive pulmonary disease ([facility] 02/26/2021    Critical illness polyneuropathy ([address] 02/26/2021    Compression fracture of spine ([address] 02/19/2021    Closed rib fracture 02/19/2021    Chest wall contusion, left, initial encounter 02/18/2021    Falls frequently 01/19/2021    Post PTCA     Headache, unspecified 01/03/2021    COVID-19 12/26/2020    Moderate persistent asthma without complication 68/47/4315    Weakness 08/05/2020    Difficulty in walking 07/27/2020    Atherosclerotic heart disease of native coronary artery with unspecified angina pectoris ([address] 07/03/2020    Essential (primary) hypertension 07/03/2020    Pain, unspecified 07/03/2020    ESRD (end stage renal disease) on dialysis ([address] 07/03/2020    Other seizures ([address] 07/03/2020    Paranoid schizophrenia ([address] 07/03/2020    Renal failure 06/28/2020    Recurrent falls 06/16/2020    Edema 12/23/2019    Closed supracondylar fracture of right humerus 12/23/2019    Other chronic pain 12/23/2019    Palliative care patient 12/23/2019    Class 2 severe obesity with serious comorbidity and body mass index (BMI) of 36.0 to 36.9 in adult [facility]) 12/03/2019    Sleep apnea, unspecified 11/05/2019    Pulmonary hypertension, unspecified ([address] 11/05/2019    Chronic diastolic congestive heart failure ([address] 10/04/2019    Shortness of breath 10/02/2019    Tardive dyskinesia 05/16/2019    Angina, class II ([address]     Controlled type 2 diabetes mellitus with diabetic neuropathy, with long-term current use of insulin ([address] 02/24/2017    Other specified diabetes mellitus with diabetic neuropathy, unspecified ([address] 08/04/2016    Stented coronary artery-plan is to stay on Plavix indefinately per Dr Dianne Juarez 06/02/2016    History of seizures     Urinary incontinence due to cognitive impairment     History of type C viral hepatitis     Diabetic nephropathy with proteinuria ([address]     Incontinence of urine 04/07/2014    Vitamin D insufficiency 02/04/2014    Vitamin B 12 deficiency 06/05/2013    Cerebral microvascular disease 06/05/2013    Hemiparesis, left ([address] 01/01/2013    Schizophrenia, paranoid, chronic     Metabolic syndrome     Mixed hyperlipidemia 12/09/2010    Other hammer toe (acquired) 12/13/2007        Past Medical History:   Diagnosis Date    Angina at rest [facility]) 5/24/2020    Anxiety     CAD S/P percutaneous coronary angioplasty 2015, 2018    stents per dr Bobby Preston    CHF (congestive heart failure) ([address]     CKD (chronic kidney disease) stage 4, GFR 15-29 ml/min ([address] 2/24/2018    CKD stage 4 due to type 2 diabetes mellitus ([address]     Contusion of right chest wall 2/16/2021    COPD (chronic obstructive pulmonary disease) ([address]     Diabetic nephropathy with proteinuria ([address] 2014    DJD (degenerative joint disease) of knee     Dr Tirado Staff GERD (gastroesophageal reflux disease)     Hemiparesis, left ([address] 2013    entered Assisted Living ([facility])    Hemodialysis patient [facility])     Hemodialysis-associated hypotension 10/22/2021    History of heart failure     History of seizures     History of type C viral hepatitis     HTN (hypertension)     Hyperlipidemia     Impaired mobility and activities of daily living     Mediastinal lymphadenopathy 2013    Riccardo Rosas    Metabolic syndrome     Moderate persistent asthma without complication 5/54/1112    Need for extended care facility 7/7/2021    Neurogenic urinary incontinence 2013    Neuropathy in diabetes [facility])     Nonrheumatic mitral valve regurgitation 7/7/2021    Nonrheumatic tricuspid valve regurgitation 7/7/2021    Obesity (BMI 30-39. 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 Ana Childs      x1 Dr. Tequila Gee, Dr Alyssa Orosco  08/10/2021    [facility]    DIAGNOSTIC CARDIAC CATH LAB PROCEDURE  10/02/2019    DIALYSIS CATHETER INSERTION Left 2022    Tunneled Symetrex 15.5F x 23cm hemodialysis catheter inserted by Dr. Violet Nguyen, TOTAL ABDOMINAL (CERVIX REMOVED)      one ovary intact, Dr Romero Clark, menorrhagia    IR THROMBECTOMY PERCUT AVF  2022    IR THROMBECTOMY PERCUT AVF 2022 [facility] SPECIAL PROCEDURE    IR TUNNELED CATHETER PLACEMENT GREATER THAN 5 YEARS  6/3/2022    IR TUNNELED CATHETER PLACEMENT GREATER THAN 5 YEARS 6/3/2022 [facility] PROCEDURE    IN TOTAL KNEE ARTHROPLASTY Left 2018    LEFT KNEE TOTAL KNEE ARTHROPLASTY, SHAYNA, NERVE BLOCK performed by Tashia Menendez MD at [address] [place] [address] PTCA      TOE AMPUTATION Right     TOTAL KNEE ARTHROPLASTY  2016    Dr Natalia Johnson TUNNELED [address] Right 2020    tunneled HD catheter per Dr Bradford Mckay        Restrictions  Restrictions/Precautions: Fall Risk         Other position/activity restrictions: L Rib fractures - 10 and 11 per patient    Safety Devices: Safety Devices  Type of Devices:  All fall risk precautions in place     Patient's date of birth confirmed: Yes    General:  Chart Reviewed: Yes  Patient assessed for rehabilitation services?: Yes  Family / Caregiver Present: No    Subjective          Pain at start of treatment: No    Pain at end of treatment: No    Prior Level of Function:  Social/Functional History  Lives With: Daughter  Type of Home: House  Home Layout: Two level,Able to Live on Main level with bedroom/bathroom  Home Access: Ramped entrance  Bathroom Shower/Tub:  (washes up at sink)  Bathroom Toilet: Standard  Home Equipment: Giacomo Hilt, rolling,Hospital bed  Has the patient had two or more falls in the past year or any fall with injury in the past year?: Yes  Receives Help From: Family (dtr is official HHA)  ADL Assistance: Needs assistance (assist with socks/brief/pans over feet, bra. Assist for hygiene after BM and with bathing)  Grooming: Independent  Feeding: Independent  Homemaking Assistance: Needs assistance  Meal Prep: Moderate  Laundry: Moderate  Vacuuming: Maximal  Cleaning: Maximal  Gardening: Unable to assess (comment)  Driving: Unable to assess (comment)  Shopping: Maximal  Homemaking Responsibilities: No  Ambulation Assistance: Independent (rollator)  Active : No (daughter and public transportation)  Education: 12th grade  Occupation: On disability  Type of Occupation: nurses aide prior  [facility] [address]: none    OBJECTIVE:     Orientation Status:  Orientation  Overall Orientation Status: Within Functional Limits    Observation:  Observation/Palpation  Posture: Fair  Observation: Pt pleasant and agreeable to eval. On 1 L O2  Body Mechanics: forward head posture  Edema: R arm and L leg swelling  Scar: B TKR scars    Cognition Status:  Cognition  Overall Cognitive Status: Exceptions  Arousal/Alertness: Appropriate responses to stimuli  Following Commands:  Follows multistep commands consistently  Attention Span: Appears intact  Memory: Appears intact  Safety Judgement: Decreased awareness of need for safety;Decreased awareness of need for assistance  Problem Solving: Assistance required to generate solutions;Assistance required to implement solutions  Insights: Decreased awareness of deficits  Initiation: Requires cues for some  Sequencing: Requires cues for some    Perception Status:  Perception  Overall Perceptual Status: WFL    Vision and Hearing Status:  Vision  Vision Exceptions: Wears glasses at all times (wears only at times)  Hearing  Hearing: Within functional limits        GROSS ASSESSMENT AROM/PROM:  AROM: Generally decreased, functional       ROM:        UE STRENGTH:  Strength: Generally decreased, functional (4-/5)    UE COORDINATION:  Coordination: Generally decreased, functional (diff with buttons/zippers)    UE TONE:       UE SENSATION:  Sensation: Impaired (neuropathy in hands/feet)    ADL Status:  ADL  Feeding: Setup  Grooming: Minimal assistance  UE Dressing: Minimal assistance  LE Dressing: Dependent/Total  LE Dressing Skilled Clinical Factors: pt unable to manage brief over hips- fear of falling in standing  Toileting: Dependent/Total  Toileting Skilled Clinical Factors: reports significant difficulty with hygiene/clothing management  Additional Comments: Simulated ADLs. Pt complaining of fatigue with activity. Pt reports unable to assist with clothing over feet. Pt on purewick upon entering room but asked OT to remove it due to discomfort    Functional Mobility:  Patient ambulated to/from bathroom with Other: rollator at [place] [address] level. Cues for safe pace/usage    Bed Mobility  Bed mobility  Supine to Sit: Contact guard assistance  Sit to Supine: Contact guard assistance  Scooting: Contact guard assistance    Seated and Standing Balance:   CGA for safety in standing     Functional Endurance:  Activity Tolerance  Activity Tolerance: Patient Tolerated treatment well  Activity Tolerance Comments: 1L O2    D/C Recommendations:  OT D/C RECOMMENDATIONS  REQUIRES OT FOLLOW-UP: Yes    Equipment Recommendations:  OT Equipment Recommendations  Other: continue to assess    OT Education:   Patient Education  Education Given To: Patient  Education Provided: Plan of Care;Role of Therapy; ADL Adaptive Strategies;Transfer Training;Equipment  Education Method: Demonstration;Verbal  Education Outcome: Verbalized understanding; Unable to demonstrate understanding;Continued education needed    OT Follow Up:   OT D/C RECOMMENDATIONS  REQUIRES OT FOLLOW-UP: Yes       Assessment/Discharge Disposition:  Assessment: Pt is a 56 y/o female who [facility] to [facility] for medical decline with fall. Pt presents with above deficits and impaired ADL status. Decreased activity tolerance and balance limiting patient today.  Recommend continued OT at this time to address deficits  Performance deficits / Impairments: Decreased functional mobility ,Decreased ADL status,Decreased cognition,Decreased balance,Decreased fine motor control,Decreased safe awareness,Decreased high-level IADLs,Decreased sensation,Decreased posture,Decreased coordination,Decreased strength,Decreased endurance  Prognosis: Fair,Good  Discharge Recommendations: Continue to assess pending progress  Decision Making: Medium Complexity  History: complex chart review  Exam: 12 perf deficits  Assistance / Modification: Min-ModA    [facility] Self care Score   How much help for putting on and taking off regular lower body clothing?: Total  How much help for Bathing?: A Lot  How much help for Toileting?: Total  How much help for putting on and taking off regular upper body clothing?: A Little  How much help for taking care of personal grooming?: A Little  How much help for eating meals?: None  AM-PAC Inpatient Daily Activity Raw Score: 14  AM-PAC Inpatient ADL T-Scale Score : 33.39  ADL Inpatient CMS 0-100% Score: 59.67    Therapy key for assistance levels -   Independent/Mod I = Pt. is able to perform task with no assistance but may require a device   Stand by assistance = Pt. does not perform task at an independent level but does not need physical assistance, requires verbal cues  Minimal, Moderate, Maximal Assistance = Pt. requires physical assistance (25%, 50%, 75% assist from helper) for task but is able to actively participate in task   Dependent = Pt. requires total assistance with task and is not able to actively participate with task completion     Plan:  Plan  Times per Week: 1-4x/week  Plan Weeks: acute LOS  Current Treatment Recommendations: Strengthening,Balance training,Functional mobility training,Endurance training,Neuromuscular re-education,Safety education & training,Patient/Caregiver education & training,Equipment evaluation, education, & procurement,Self-Care / ADL,Home management training    Goals:   Patient will:    - Improve functional endurance to tolerate/complete 20 mins of ADL's  - Be SUP in UB ADLs   - Be Celeste in LB ADLs  - Be SUP in ADL transfers without LOB  - Improve B hand fine motor coordination to [facility] in order to manage clothing fasteners/self-care containers in a timely manner  - Improve B UE strength and endurance to 4+/5 in order to participate in self-care activities as projected. - Access appropriate D/C site with as few architectural barriers as possible.     Patient Goal: Patient goals : return home     Discussed and agreed upon: Yes Comments:       Therapy Time:   Individual   Time In 1141   Time Out 1205   Minutes 24          Eval: 24 minutes     Electronically signed by:    Sulaiman Warner OT,   7/3/2022, 12:15 PM

## 2022-07-03 NOTE — PROGRESS NOTES
Infectious Disease     Patient Name: Kerry Johnson  Date: 7/3/2022  YOB: 1960  Medical Record Number: [id number]        Sepsis with Enterococcus  Infected dialysis cath     mitral valve regurgitation  coronary artery bypass graft x1 vessel with tricuspid valve repair on 1/21/2022          Patient presented to the hospital from home after a fall having back pain emergency department, patient had fracture of 10th and 11th ribs  Negative for COVID      Blood cultures from admission 2 sets growing gram-positive cocci in chains  Identified as Enterococcus faecalis by PCR  Patient currently on vancomycin    Patient has dialysis catheter in left chest which has been used while her dialysis fistula in right upper extremity has been repaired she states they are now using the fistula in right arm            Review of Systems   Constitutional: Negative for chills, diaphoresis, fatigue and fever. Respiratory: Positive for shortness of breath. Negative for cough. Cardiovascular: Negative. Gastrointestinal: Negative. Physical Exam  Constitutional:       Appearance: She is ill-appearing. Cardiovascular:      Heart sounds: Murmur heard. Pulmonary:      Effort: Pulmonary effort is normal. No respiratory distress. Breath sounds: Normal breath sounds. No wheezing, rhonchi or rales. Abdominal:      General: Abdomen is flat. Bowel sounds are normal. There is no distension. Palpations: Abdomen is soft. There is no mass. Tenderness: There is no abdominal tenderness. There is no guarding or rebound. Hernia: No hernia is present. Blood pressure (!) 109/46, pulse 82, temperature 98.1 °F (36.7 °C), temperature source Oral, resp. rate 20, height 5' 7\" (1.702 m), weight 196 lb (88.9 kg), SpO2 100 %, not currently breastfeeding.       .   Lab Results   Component Value Date    WBC 6.0 07/03/2022    HGB 9.2 (L) 07/03/2022    HCT 26.4 (L) 07/03/2022    MCV 92.2 07/03/2022  (L) 07/03/2022     Lab Results   Component Value Date/Time     07/03/2022 03:24 AM    K 4.0 07/03/2022 03:24 AM    CL 92 07/03/2022 03:24 AM    CO2 30 07/03/2022 03:24 AM    BUN 15 07/03/2022 03:24 AM    CREATININE 3.00 07/03/2022 03:24 AM    GLUCOSE 119 07/03/2022 03:24 AM    GLUCOSE 419 07/30/2021 08:16 AM    CALCIUM 9.1 07/03/2022 03:24 AM         7/1/22 1613   Culture Catheter Tip Culture, Catheter Tip:  NO GROWTH   Performed at [address] [place] [address]   ([phone]        6/30/22 6707   Culture, Blood Id Sensitivity  Abnormal   Cult,Blood:  POSITIVE Blood Culture   Performed at [address] [place] [address]   ([phone]   P      Organism Enterococcus faecalis Abnormal  P    Resulting Agency [address] [facility]          Susceptibility      Enterococcus faecalis (2)    Antibiotic Interpretation Microscan  Method Status    ampicillin Sensitive <=2 mcg/mL BACTERIAL SUSCEPTIBILITY PANEL BY DIYA     vancomycin Sensitive 1 mcg/mL BACTERIAL SUSCEPTIBILITY PANEL BY DIYA                      ASSESSMENT:  PLAN:    Sepsis with Enterococcus  Infected dialysis cath  Continue vancomycin  Need to rule out endocarditis      Repeat blood cultures pend

## 2022-07-03 NOTE — PROGRESS NOTES
[address] Pharmacokinetic Monitoring Service - Vancomycin    Consulting Provider: Dr David Michael   Indication: bacteremia  Target Concentration: Pre-Dialysis Concentration 21-24 mg/L  Day of Therapy: 3  Additional Antimicrobials: n/a    Pertinent Laboratory Values: Wt Readings from Last 1 Encounters:   07/03/22 196 lb (88.9 kg)     Temp Readings from Last 1 Encounters:   07/03/22 98.1 °F (36.7 °C) (Oral)     Recent Labs     07/01/22  0328 07/02/22  0547 07/02/22  0548 07/03/22  0324   CREATININE   < >  --  4.50* 3.00*   WBC  --  6.6  --  6.0    < > = values in this interval not displayed. Recent vancomycin administrations                     vancomycin 1000 mg IVPB in 250 mL D5W addavial (mg) 1,000 mg New Bag 07/02/22 1621    vancomycin (VANCOCIN) 2,000 mg in dextrose 5 % 500 mL IVPB (mg) 2,000 mg New Bag 07/01/22 1121                    Assessment:  Date/Time Current Dose  Dialysis Session   7-3-22 1430 1000mg x1 post HD  Last 7/2/22, no new orders at this time     Plan:  Concentration-guided dosing due to renal impairment and intermittent hemodialysis   Current dosing regimen : 1000mg x1 post HD    Will assess for next   dose needed after HD ordered. Vancomycin concentration to be ordered based on next planned HD session.   Pharmacy will continue to monitor patient and adjust therapy as indicated    Thank you for the DWIGHT Maki [facility]  7/3/2022 2:23 PM

## 2022-07-03 NOTE — PROGRESS NOTES
Nephrology Progress Note    Assessment:  ESRDX  Enterococcus Recent mitral valve sx  Hx CV remote  DM type-2  Hx Viral C  COPD  Anemia  Hypertension  Hx Schizophrenia        Plan:check procalcinotin  Dialysis Tuesday  [facility]  Patient Active Problem List:     Atherosclerotic heart disease of native coronary artery with unspecified angina pectoris (HCC)     Schizophrenia, paranoid, chronic     Metabolic syndrome     Vitamin B 12 deficiency     Cerebral microvascular disease     Mixed hyperlipidemia     Other hammer toe (acquired)     Vitamin D insufficiency     Incontinence of urine     Diabetic nephropathy with proteinuria ([address]     Essential (primary) hypertension     History of type C viral hepatitis     Urinary incontinence due to cognitive impairment     History of seizures     Stented coronary artery-plan is to stay on Plavix indefinately per Dr Radha Hedrick     Other specified diabetes mellitus with diabetic neuropathy, unspecified ([address]     Controlled type 2 diabetes mellitus with diabetic neuropathy, with long-term current use of insulin (HCC)     Hemiparesis, left (HCC)     Angina, class II ([address]     Pain, unspecified     Tardive dyskinesia     Shortness of breath     Chronic diastolic congestive heart failure (HCC)     Sleep apnea, unspecified     Pulmonary hypertension, unspecified (HCC)     Class 2 severe obesity with serious comorbidity and body mass index (BMI) of 36.0 to 36.9 in [facility])     Edema     Closed supracondylar fracture of right humerus     Other chronic pain     Palliative care patient     Recurrent falls     Renal failure     Difficulty in walking     ESRD (end stage renal disease) on dialysis ([address]     Weakness     Other seizures (HCC)     Moderate persistent asthma without complication     [id number]     Post PTCA     Falls frequently     Chest wall contusion, left, initial encounter     Headache, unspecified     Paranoid schizophrenia ([address]     Compression fracture of spine ([address]     Closed rib fracture     Depression     Chronic obstructive pulmonary disease (HCC)     Critical illness polyneuropathy ([facility])     Multiple closed fractures of ribs of right side     Nonrheumatic mitral valve regurgitation     Nonrheumatic tricuspid valve regurgitation     Need for extended care facility     Chronic pain of both shoulders     Hemodialysis-associated hypotension     Anginal chest pain at rest [facility])     Chest pain     Unstable angina ([facility])     NSTEMI (non-ST elevated myocardial infarction) ([facility])     CKD (chronic kidney disease) stage 4, GFR 15-29 ml/min ([facility])     Hyperkalemia     Impaired mobility and activities of daily living     Dialysis patient [facility])     Unspecified open wound of right upper arm, initial encounter     Multiple closed fractures of right lower extremity and ribs     Closed T11 fracture ([facility])     Encephalopathy acute     MRSA bacteremia     Sepsis due to Enterococcus ([facility]     Local infection due to central venous catheter      Subjective:  Admit Date: 6/30/2022    Interval History: no issues  No chills /fever    Medications:  Scheduled Meds:   insulin lispro  0-12 Units SubCUTAneous TID WC    insulin lispro  0-6 Units SubCUTAneous Nightly    lidocaine  1 patch TransDERmal Daily    heparin (porcine)  2,000 Units IntraVENous Once    epoetin chadwick-epbx  10,000 Units SubCUTAneous Once    vancomycin (VANCOCIN) intermittent dosing (placeholder)   Other RX Placeholder    ARIPiprazole  5 mg Oral Daily    aspirin EC  81 mg Oral Daily    atorvastatin  40 mg Oral Nightly    insulin glargine  35 Units SubCUTAneous Nightly    isosorbide mononitrate  120 mg Oral Daily    magnesium oxide  400 mg Oral Daily    melatonin  10 mg Oral Nightly    midodrine  5 mg Oral Once per day on Mon Wed Fri    miconazole   Topical BID    sertraline  50 mg Oral Nightly     Continuous Infusions:   dextrose         CBC:   Recent Labs     07/02/22  0547 07/03/22  0324   WBC 6.6 6.0   HGB 9.9* 9.2*   PLT 94* 104* CMP:    Recent Labs     07/01/22  0328 07/02/22  0548 07/03/22  0324    135 131*   K 4.8 4.3 4.0   CL 96 93* 92*   CO2 30 30 30   BUN 27* 25* 15   CREATININE 5.48* 4.50* 3.00*   GLUCOSE 151* 140* 119*   CALCIUM 9.6 9.3 9.1   LABGLOM 7.8* 9.8* 15.7*     Troponin: No results for input(s): TROPONINI in the last 72 hours. BNP: No results for input(s): BNP in the last 72 hours. INR: No results for input(s): INR in the last 72 hours. Lipids: No results for input(s): CHOL, LDLDIRECT, TRIG, HDL, AMYLASE, LIPASE in the last 72 hours. Liver:   Recent Labs     07/03/22 0324   AST 16   ALT 15   ALKPHOS 114   PROT 6.1*   LABALBU 3.3*   BILITOT 0.9*     Iron:  No results for input(s): IRONS, FERRITIN in the last 72 hours. Invalid input(s): LABIRONS  Urinalysis: No results for input(s): UA in the last 72 hours.     Objective:  Vitals: BP (!) 116/46   Pulse 77   Temp 98.5 °F (36.9 °C) (Oral)   Resp 16   Ht 5' 7\" (1.702 m)   Wt 196 lb (88.9 kg)   LMP  (LMP Unknown)   SpO2 100%   BMI 30.70 kg/m²    Wt Readings from Last 3 Encounters:   07/03/22 196 lb (88.9 kg)   06/22/22 217 lb (98.4 kg)   06/06/22 215 lb 9.8 oz (97.8 kg)      24HR INTAKE/OUTPUT:      Intake/Output Summary (Last 24 hours) at 7/3/2022 6345  Last data filed at 7/2/2022 2254  Gross per 24 hour   Intake 880 ml   Output 2575 ml   Net -1695 ml       General: alert, in no apparent distress  HEENT: normocephalic, atraumatic, anicteric  Neck: supple, no mass  Lungs: non-labored respirations, clear to auscultation bilaterally  Heart: regular rate and rhythm, no murmurs or rubs  Abdomen: soft, non-tender, non-distended  Ext: no cyanosis, no peripheral edema  Neuro: alert and oriented, no gross abnormalities  Psych: normal mood and affect  Skin: no rash      Electronically signed by Katlyn Greenwood DO, MD

## 2022-07-04 ENCOUNTER — APPOINTMENT (OUTPATIENT)
Dept: MRI IMAGING | Age: 62
DRG: 314 | End: 2022-07-04
Payer: MEDICARE

## 2022-07-04 LAB
ALBUMIN SERPL-MCNC: 3.3 G/DL (ref 3.5–4.6)
ALP BLD-CCNC: 117 U/L (ref 40–130)
ALT SERPL-CCNC: 16 U/L (ref 0–33)
ANION GAP SERPL CALCULATED.3IONS-SCNC: 11 MEQ/L (ref 9–15)
AST SERPL-CCNC: 21 U/L (ref 0–35)
BASOPHILS ABSOLUTE: 0 K/UL (ref 0–0.2)
BASOPHILS RELATIVE PERCENT: 0.7 %
BILIRUB SERPL-MCNC: 0.9 MG/DL (ref 0.2–0.7)
BUN BLDV-MCNC: 27 MG/DL (ref 8–23)
CALCIUM SERPL-MCNC: 9 MG/DL (ref 8.5–9.9)
CHLORIDE BLD-SCNC: 90 MEQ/L (ref 95–107)
CO2: 27 MEQ/L (ref 20–31)
CREAT SERPL-MCNC: 4.16 MG/DL (ref 0.5–0.9)
CULTURE, BLOOD ID SENSITIVITY: ABNORMAL
EOSINOPHILS ABSOLUTE: 0.3 K/UL (ref 0–0.7)
EOSINOPHILS RELATIVE PERCENT: 5.2 %
GFR AFRICAN AMERICAN: 13
GFR NON-AFRICAN AMERICAN: 10.8
GLOBULIN: 2.8 G/DL (ref 2.3–3.5)
GLUCOSE BLD-MCNC: 154 MG/DL (ref 70–99)
GLUCOSE BLD-MCNC: 164 MG/DL (ref 70–99)
GLUCOSE BLD-MCNC: 189 MG/DL (ref 70–99)
GLUCOSE BLD-MCNC: 193 MG/DL (ref 70–99)
GLUCOSE BLD-MCNC: 218 MG/DL (ref 70–99)
HCT VFR BLD CALC: 26.3 % (ref 37–47)
HEMOGLOBIN: 9.1 G/DL (ref 12–16)
LYMPHOCYTES ABSOLUTE: 1.2 K/UL (ref 1–4.8)
LYMPHOCYTES RELATIVE PERCENT: 21.6 %
MCH RBC QN AUTO: 32.1 PG (ref 27–31.3)
MCHC RBC AUTO-ENTMCNC: 34.5 % (ref 33–37)
MCV RBC AUTO: 93 FL (ref 82–100)
MONOCYTES ABSOLUTE: 0.6 K/UL (ref 0.2–0.8)
MONOCYTES RELATIVE PERCENT: 10.1 %
NEUTROPHILS ABSOLUTE: 3.6 K/UL (ref 1.4–6.5)
NEUTROPHILS RELATIVE PERCENT: 62.4 %
ORGANISM: ABNORMAL
PDW BLD-RTO: 15 % (ref 11.5–14.5)
PERFORMED ON: ABNORMAL
PLATELET # BLD: 127 K/UL (ref 130–400)
POTASSIUM REFLEX MAGNESIUM: 4.3 MEQ/L (ref 3.4–4.9)
RBC # BLD: 2.83 M/UL (ref 4.2–5.4)
SODIUM BLD-SCNC: 128 MEQ/L (ref 135–144)
TOTAL PROTEIN: 6.1 G/DL (ref 6.3–8)
WBC # BLD: 5.8 K/UL (ref 4.8–10.8)

## 2022-07-04 PROCEDURE — 6370000000 HC RX 637 (ALT 250 FOR IP): Performed by: NURSE PRACTITIONER

## 2022-07-04 PROCEDURE — 99232 SBSQ HOSP IP/OBS MODERATE 35: CPT | Performed by: PHYSICAL MEDICINE & REHABILITATION

## 2022-07-04 PROCEDURE — 72148 MRI LUMBAR SPINE W/O DYE: CPT

## 2022-07-04 PROCEDURE — 6370000000 HC RX 637 (ALT 250 FOR IP): Performed by: INTERNAL MEDICINE

## 2022-07-04 PROCEDURE — 6370000000 HC RX 637 (ALT 250 FOR IP): Performed by: PHYSICAL MEDICINE & REHABILITATION

## 2022-07-04 PROCEDURE — 99232 SBSQ HOSP IP/OBS MODERATE 35: CPT | Performed by: INTERNAL MEDICINE

## 2022-07-04 PROCEDURE — 36415 COLL VENOUS BLD VENIPUNCTURE: CPT

## 2022-07-04 PROCEDURE — 70551 MRI BRAIN STEM W/O DYE: CPT

## 2022-07-04 PROCEDURE — 85025 COMPLETE CBC W/AUTO DIFF WBC: CPT

## 2022-07-04 PROCEDURE — 80053 COMPREHEN METABOLIC PANEL: CPT

## 2022-07-04 PROCEDURE — 1210000000 HC MED SURG R&B

## 2022-07-04 PROCEDURE — 99233 SBSQ HOSP IP/OBS HIGH 50: CPT | Performed by: PSYCHIATRY & NEUROLOGY

## 2022-07-04 PROCEDURE — 6360000002 HC RX W HCPCS: Performed by: INTERNAL MEDICINE

## 2022-07-04 RX ORDER — LIDOCAINE 4 G/G
3 PATCH TOPICAL DAILY
Status: DISCONTINUED | OUTPATIENT
Start: 2022-07-04 | End: 2022-07-09 | Stop reason: HOSPADM

## 2022-07-04 RX ADMIN — ARIPIPRAZOLE 5 MG: 5 TABLET ORAL at 08:24

## 2022-07-04 RX ADMIN — ACETAMINOPHEN 325MG 650 MG: 325 TABLET ORAL at 13:08

## 2022-07-04 RX ADMIN — ATORVASTATIN CALCIUM 40 MG: 40 TABLET, FILM COATED ORAL at 20:14

## 2022-07-04 RX ADMIN — Medication 10 MG: at 20:14

## 2022-07-04 RX ADMIN — INSULIN GLARGINE 35 UNITS: 100 INJECTION, SOLUTION SUBCUTANEOUS at 20:22

## 2022-07-04 RX ADMIN — MIDODRINE HYDROCHLORIDE 5 MG: 5 TABLET ORAL at 08:24

## 2022-07-04 RX ADMIN — INSULIN LISPRO 1 UNITS: 100 INJECTION, SOLUTION INTRAVENOUS; SUBCUTANEOUS at 20:22

## 2022-07-04 RX ADMIN — PROCHLORPERAZINE EDISYLATE 10 MG: 5 INJECTION INTRAMUSCULAR; INTRAVENOUS at 13:35

## 2022-07-04 RX ADMIN — SERTRALINE HYDROCHLORIDE 50 MG: 50 TABLET ORAL at 20:14

## 2022-07-04 RX ADMIN — POLYETHYLENE GLYCOL 3350 17 G: 17 POWDER, FOR SOLUTION ORAL at 08:23

## 2022-07-04 RX ADMIN — INSULIN LISPRO 2 UNITS: 100 INJECTION, SOLUTION INTRAVENOUS; SUBCUTANEOUS at 08:32

## 2022-07-04 RX ADMIN — MICONAZOLE NITRATE: 2 POWDER TOPICAL at 20:16

## 2022-07-04 RX ADMIN — INSULIN LISPRO 2 UNITS: 100 INJECTION, SOLUTION INTRAVENOUS; SUBCUTANEOUS at 17:16

## 2022-07-04 RX ADMIN — ASPIRIN 81 MG: 81 TABLET, COATED ORAL at 08:24

## 2022-07-04 RX ADMIN — ISOSORBIDE MONONITRATE 120 MG: 60 TABLET, EXTENDED RELEASE ORAL at 08:24

## 2022-07-04 RX ADMIN — INSULIN LISPRO 4 UNITS: 100 INJECTION, SOLUTION INTRAVENOUS; SUBCUTANEOUS at 12:32

## 2022-07-04 RX ADMIN — Medication 400 MG: at 08:24

## 2022-07-04 ASSESSMENT — ENCOUNTER SYMPTOMS
SHORTNESS OF BREATH: 0
NAUSEA: 0
BACK PAIN: 1
PHOTOPHOBIA: 0
GASTROINTESTINAL NEGATIVE: 1
VOMITING: 0
CHOKING: 0
TROUBLE SWALLOWING: 0
COLOR CHANGE: 0
COUGH: 0

## 2022-07-04 ASSESSMENT — PAIN DESCRIPTION - INTENSITY: RATING_2: 0

## 2022-07-04 ASSESSMENT — PAIN SCALES - GENERAL: PAINLEVEL_OUTOF10: 0

## 2022-07-04 NOTE — PROGRESS NOTES
Neurology Follow up    SUBJECTIVE: Complains of back pain and weakness in the lower extremities. Patient has MRSA bacteremia. Continues complain of back pain and difficulty with walking. Patient continues to complain of weakness now sitting in the chair.   MRI was not done as there appeared to be some issues with the stents and cardiology saw her    Current Facility-Administered Medications   Medication Dose Route Frequency Provider Last Rate Last Admin    [START ON 7/5/2022] vancomycin 1000 mg IVPB in 250 mL D5W addavial  1,000 mg IntraVENous Once Celia Jean Baptiste MD        lidocaine 4 % external patch 3 patch  3 patch TransDERmal Daily Jennifer Scullin, DO        camphor-menthol-methyl salicylate (BENGAY ULTRA STRENGTH) 4-10-30 % cream   Apply externally TID PRN Jennifer Monacoin, DO        polyethylene glycol (GLYCOLAX) packet 17 g  17 g Oral Daily Nicoletto Bolzan-Roche, APRN - CNP   17 g at 07/04/22 0823    insulin lispro (HUMALOG) injection vial 0-12 Units  0-12 Units SubCUTAneous TID WC Nicoletto Bolzan-Roche, APRN - CNP   2 Units at 07/03/22 1748    insulin lispro (HUMALOG) injection vial 0-6 Units  0-6 Units SubCUTAneous Nightly Nicoletto Bolzan-Roche, APRN - CNP   1 Units at 07/03/22 2233    heparin (porcine) injection 2,000 Units  2,000 Units IntraVENous Once Lynda Anika, DO        epoetin chadwick-epbx (RETACRIT) injection 10,000 Units  10,000 Units SubCUTAneous Once Lynda Anika, DO        vancomycin [facility]) intermittent dosing (placeholder)   Other RX Placeholder Grady Lanes, DO        melatonin tablet 3 mg  3 mg Oral Nightly PRN Grady Lanes, DO        acetaminophen (TYLENOL) tablet 650 mg  650 mg Oral Q6H PRN Grady Lanes, DO   650 mg at 07/03/22 1753    Or    acetaminophen (TYLENOL) suppository 650 mg  650 mg Rectal Q6H PRN Grady Lanes, DO   650 mg at 07/01/22 1354    prochlorperazine (COMPAZINE) injection 10 mg  10 mg IntraVENous TID PRN Grady Lanes, DO   10 mg at 06/30/22 1507    albuterol (PROVENTIL) nebulizer solution 2.5 mg  2.5 mg Nebulization Q4H PRN Ramana Shoulder, DO        ARIPiprazole (ABILIFY) tablet 5 mg  5 mg Oral Daily Ramana Shoulder, DO   5 mg at 07/04/22 0824    aspirin EC tablet 81 mg  81 mg Oral Daily Ramana Shoulder, DO   81 mg at 07/04/22 0824    atorvastatin (LIPITOR) tablet 40 mg  40 mg Oral Nightly Ramana Shoulder, DO   40 mg at 07/03/22 2138    hydrOXYzine HCl (ATARAX) tablet 25 mg  25 mg Oral TID PRN Ramana Shoulder, DO        insulin glargine (LANTUS) injection vial 35 Units  35 Units SubCUTAneous Nightly Ramana Shoulder, DO   35 Units at 07/03/22 2233    isosorbide mononitrate (IMDUR) extended release tablet 120 mg  120 mg Oral Daily Ramana Shoulder, DO   120 mg at 07/04/22 0824    magnesium oxide (MAG-OX) tablet 400 mg  400 mg Oral Daily Ramana Shoulder, DO   400 mg at 07/04/22 7095    melatonin disintegrating tablet 10 mg  10 mg Oral Nightly Ramana Shoulder, DO   10 mg at 07/03/22 2138    midodrine (PROAMATINE) tablet 5 mg  5 mg Oral Once per day on Mon Wed Fri Roxy Hendrix, DO   5 mg at 07/04/22 1744    miconazole (MICOTIN) 2 % powder   Topical BID Ramana Shoulder, DO   Given at 07/03/22 2140    sertraline (ZOLOFT) tablet 50 mg  50 mg Oral Nightly Ramana Shoulder, DO   50 mg at 07/03/22 2138    glucose chewable tablet 16 g  4 tablet Oral PRN Ramana Shoulder, DO        dextrose bolus 10% 125 mL  125 mL IntraVENous PRN Ramana Shoulder, DO        Or    dextrose bolus 10% 250 mL  250 mL IntraVENous PRN Ramana Shoulder, DO        glucagon (rDNA) injection 1 mg  1 mg IntraMUSCular PRN Ramana Shoulder, DO        dextrose 5 % solution  100 mL/hr IntraVENous PRN Ramana Shoulder, DO           PHYSICAL EXAM:    /60   Pulse 74   Temp 98.1 °F (36.7 °C) (Oral)   Resp 18   Ht 5' 7\" (1.702 m)   Wt 196 lb (88.9 kg)   LMP  (LMP Unknown)   SpO2 94%   BMI 30.70 kg/m²    General Appearance:      Skin:  normal  CVS - Normal sounds, No murmurs , No carotid Bruits  RS -CTA  Abdomen Soft, BS present  Review of Systems   Constitutional: Negative for fever. HENT: Negative for ear pain, tinnitus and trouble swallowing. Eyes: Negative for photophobia and visual disturbance. Respiratory: Negative for choking and shortness of breath. Cardiovascular: Negative for chest pain and palpitations. Gastrointestinal: Negative for nausea and vomiting. Musculoskeletal: Positive for arthralgias, back pain, gait problem and myalgias. Negative for joint swelling, neck pain and neck stiffness. Skin: Negative for color change. Allergic/Immunologic: Negative for food allergies. Neurological: Positive for weakness. Negative for dizziness, tremors, seizures, syncope, facial asymmetry, speech difficulty, light-headedness, numbness and headaches. Psychiatric/Behavioral: Negative for behavioral problems, confusion, hallucinations and sleep disturbance. review as noted.   Patient reports of weakness in the legs and back pain  Mental Status Exam:             Level of Alertness:   awake            Orientation:   person, place, time                    Attention/Concentration:  normal            Language:  normal      Funduscopic Exam:     Cranial Nerves            Cranial nerve III           Pupils:  equal, round, reactive to light      Cranial nerves III, IV, VI           Extraocular Movements: intact      Cranial nerve V           Facial sensation:  intact      Cranial nerve VII           Facial strength: intact      Cranial nerve VIII           Hearing:  intact      Cranial nerve IX           Palate:  intact      Cranial nerve XI         Shoulder shrug:  intact      Cranial nerve XII          Tongue movement:  normal    Motor: No extremity strength of 4/ 5 with areflexia            Sensory:        Pinprick             Right Upper Extremity:  normal             Left Upper Extremity:  normal             Right Lower Extremity:  normal             Left Lower Extremity:  normal Vibration                         Touch            Proprioception                 Coordination:           Finger/Nose   Right:  normal              Left:  normal          Heel-Knee-Shin                Right:  normal              Left:  normal          Rapid Alternating Movements              Right:  normal              Left:  normal          Gait:                       Casual: proximal muscle weakness is notable upon standing reflexes:             Deep Tendon Reflexes:             Reflexes are 2 +             Plantar response:                Right:  downgoing               Left:  downgoing    Vascular:  Cardiac Exam:  normal         FL MODIFIED BARIUM SWALLOW W VIDEO    Result Date: 7/1/2022  FL MODIFIED BARIUM SWALLOW W VIDEO : 7/1/2022 CLINICAL HISTORY:  Choking with meal, concern for aspiration/dysphagia . COMPARISON: 5/10/2013. TECHNIQUE: Lateral videofluoroscopy was provided during speech therapy evaluation during ingestion of various barium liquids and semisolids. A total of 1.6 minutes of fluoroscopy time was used, with a total of  10 fluoroscopic series and one still saved. No diagnostic images were saved. Oral contrast:  50 mL BaSO4 FINDINGS: Mild to moderate oral dysphagia is observed with premature entry into the piriform sinuses and mild to moderate oral residual that cleared with independent re-swallowing. Endovaginal laryngeal penetration was observed with thin and soft barium consistencies that immediately cleared. Mild to moderate residual within the vallecula cleared with recent swallowing. No tracheal aspiration was identified. A full report will be made by the speech pathology team.     MILD TO MODERATELY ABNORMAL MODIFIED BARIUM SWALLOW. NO TRACHEAL ASPIRATION IDENTIFIED.  A FULL REPORT WILL BE MADE BY THE SPEECH PATHOLOGY TEAM.       Recent Labs     07/02/22  0547 07/03/22  0324 07/04/22  0307   WBC 6.6 6.0 5.8   HGB 9.9* 9.2* 9.1*   PLT 94* 104* 127*     Recent Labs     07/02/22  0548 07/03/22  0324 07/04/22  0307    131* 128*   K 4.3 4.0 4.3   CL 93* 92* 90*   CO2 30 30 27   BUN 25* 15 27*   CREATININE 4.50* 3.00* 4.16*   GLUCOSE 140* 119* 154*     Recent Labs     07/02/22  0548 07/03/22  0324 07/04/22  0307   BILITOT 1.3* 0.9* 0.9*   ALKPHOS 124 114 117   AST 20 16 21   ALT 19 15 16     Lab Results   Component Value Date/Time    PROTIME 16.3 06/30/2022 07:30 AM    INR 1.3 06/30/2022 07:30 AM     No results found for: LITHIUM, DILFRTOT, VALPROATE    ASSESSMENT AND PLAN  With ataxia with falls with lower extremity weakness. A lumbar canal stenosis must be ruled out. Recommend an MRI of the lumbosacral spine and the brain given that she has underlying bacteremia to make sure this is not endocarditis. She also has peripheral neuropathy to explain her lower extremity weakness as well had findings discussed. Exam as noted above. Patient has some ataxia and lower extremity weakness. We had recommended a lumbar spine evaluation with an MRI with an MRI I of the brain to make sure there is no septic emboli given underlying blood cultures. MRI is pending. Complain of knee pain which may be causing some degree with difficult ambulation. Exam remains unchanged with lower extremity weakness and back pain with areflexia. MRI was not done as there appeared to be some issues about stents and cardiology has been consulted for the same. Will await for the MRI prior to any other recommendations  Darren Rao MD, [id number], American Board of Psychiatry & Neurology  Board Certified in Vascular Neurology  Board Certified in Neuromuscular Medicine  Certified in . [address]

## 2022-07-04 NOTE — CARE COORDINATION
Inpatient Rehab referral received. Met with patient and explained [facility] Acute Inpatient Rehab program and requirements, including 3 hours of intense therapy daily, anticipated length of stay and goal of discharge to home. All questions answered and patient verbalized understanding. Freedom of choice provided and patient requests admit to [facility] if needed once medically cleared. Patient to have [facility] completed yet and is HD Tues, Thurs, Sat. Will continue to follow.  Electronically signed by Aaron Bob RN on 7/4/22 at 9:36 AM EDT

## 2022-07-04 NOTE — PROGRESS NOTES
Chief Complaint   Patient presents with    Back Pain       since fall last night from standing          Patient is a 57 y.o. female who presents with a chief complaint of fall. Patient is followed on a regular basis by Dr. Mick Queen MD.  Patient with past medical history of Crohn disease status post CABG/PCI, tricuspid valve repair, chronic congestive heart failure, end-stage renal disease hemodialysis dependent, hypertension, hyperlipidemia, diabetes mellitus type 2. Apparently patient on admission she was noted to have bacteremia with MRSA as well as Enterococcus faecalis. We are asked to rule out endocarditis. Patient is status post removal of temporary hemodialysis catheter. Status post recent right upper extremity fistula thrombectomy. She denies any chest pain or shortness of breath at this time       7/3/2022 no new events overnight. Resting comfortably    7/4/2022: Patient is resting comfortably. Denies any chest pain or shortness of breath.     CV Data:  6/2020 Echo EF 60  Mild mR  RVSP 46   7/2020 LAD DEWEY. She has prior stents as well.   8/21 Echo EF 60  8/2021 LAD recurrent ISR with double layer stents. Went to [facility] and had redo stent.    12/21 CUS B/l ICA 50-69  12/21 Spec tnegative  1/12/22 Cath LAD IST   1/2022 CABG LIMA - LAD + TV Repair complicated by Tamponade and pericardial window.   4/25/22 eCHO ef 60 NO pe rvsp 68        Past Medical History        Past Medical History:   Diagnosis Date    Angina at rest [facility]) 5/24/2020    Anxiety      CAD S/P percutaneous coronary angioplasty 2015, 2018     stents per dr Alexandria Alva    CHF (congestive heart failure) ([address]      CKD (chronic kidney disease) stage 4, GFR 15-29 ml/min ([address] 2/24/2018    CKD stage 4 due to type 2 diabetes mellitus ([address]      Contusion of right chest wall 2/16/2021    COPD (chronic obstructive pulmonary disease) ([address]      Diabetic nephropathy with proteinuria ([address] 2014    DJD (degenerative hypertension    History of type C viral hepatitis    Urinary incontinence due to cognitive impairment    History of seizures    Stented coronary artery-plan is to stay on Plavix indefinately per Dr Letty Herrera Other specified diabetes mellitus with diabetic neuropathy, unspecified ([address]    Controlled type 2 diabetes mellitus with diabetic neuropathy, with long-term current use of insulin ([facility])    Hemiparesis, left ([facility])    Angina, class II ([address]    Pain, unspecified    Tardive dyskinesia    Shortness of breath    Chronic diastolic congestive heart failure ([facility])    Sleep apnea, unspecified    Pulmonary hypertension, unspecified ([facility])    Class 2 severe obesity with serious comorbidity and body mass index (BMI) of 36.0 to 36.9 in adult ([facility])    Edema    Closed supracondylar fracture of right humerus    Other chronic pain    Palliative care patient    Recurrent falls    Renal failure    Difficulty in walking    ESRD (end stage renal disease) on dialysis ([facility])    Weakness    Other seizures ([facility])    Moderate persistent asthma without complication    BAUWD-27    Post PTCA    Falls frequently    Chest wall contusion, left, initial encounter    Headache, unspecified    Paranoid schizophrenia ([address]    Compression fracture of spine ([facility])    Closed rib fracture    Depression    Chronic obstructive pulmonary disease ([facility])    Critical illness polyneuropathy ([address]    Multiple closed fractures of ribs of right side    Nonrheumatic mitral valve regurgitation    Nonrheumatic tricuspid valve regurgitation    Need for extended care facility    Chronic pain of both shoulders    Hemodialysis-associated hypotension    Anginal chest pain at rest [facility])    Chest pain    Unstable angina ([facility])    NSTEMI (non-ST elevated myocardial infarction) ([facility])    CKD (chronic kidney disease) stage 4, GFR 15-29 ml/min ([facility])    Hyperkalemia    Impaired mobility and activities of daily living   [facility])    Unspecified open wound of right upper arm, initial encounter    Multiple closed fractures of right lower extremity and ribs    Closed T11 fracture ([address]    Encephalopathy acute    MRSA bacteremia    Sepsis due to Enterococcus ([address]    Local infection due to central venous catheter         Past Surgical History         Past Surgical History:   Procedure Laterality Date     SECTION         x1    COLONOSCOPY   2014     Dr. Liam Covarrubias x1 Dr. Wai Garcia, Dr Renetta Parmar 2018    Pina Stoner   08/10/2021     [facility]    DIAGNOSTIC CARDIAC CATH LAB PROCEDURE   10/02/2019    DIALYSIS CATHETER INSERTION Left 2022     Tunneled Symetrex 15.5F x 23cm hemodialysis catheter inserted by Dr. Aris Lowery, TOTAL ABDOMINAL (CERVIX REMOVED)         one ovary intact, Dr Merrill Leganca, menorrhagia    IR THROMBECTOMY PERCUT AVF   2022     IR THROMBECTOMY PERCUT AVF 2022 [facility] SPECIAL PROCEDURE    IR TUNNELED CATHETER PLACEMENT GREATER THAN 5 YEARS   6/3/2022     IR TUNNELED CATHETER PLACEMENT GREATER THAN 5 YEARS 6/3/2022 [facility] SPECIAL PROCEDURE    [place] TOTAL KNEE ARTHROPLASTY Left 2018     LEFT KNEE TOTAL KNEE ARTHROPLASTY, SHAYNA, NERVE BLOCK performed by Smith Colon MD at [address] [place] [address] PTCA        TOE AMPUTATION Right      TOTAL KNEE ARTHROPLASTY   2016     Dr Tirado Staff TUNNELED [address] Right 2020     tunneled HD catheter per Dr Gio Tamayo History   Social History            Socioeconomic History    Marital status:        Spouse name: None    Number of children: 2    Years of education: None    Highest education level: None   Occupational History    Occupation: disabled   Tobacco Use    Smoking status: Never Smoker    Smokeless tobacco: Never Used   Vaping Use    Vaping Use: Never used   Substance and Sexual Activity  Alcohol use: No       Alcohol/week: 0.0 standard drinks    Drug use: No    Sexual activity: Not Currently   Other Topics Concern    None   Social History Narrative     Disabled, lives in [facility]     [facility] is close by      Born in [place], one of 5     Twin sister Reyes, very ill in 2018, [place] 2019     Moved to [place], , 2 children, one son and one daughter     Worked at [employer], as a nurse's aide     Disabled due to mental illness     Lived at [employer], was discharged, returned to independent living in 2017 in the daughter's house and has adjusted well     One son and one daughter, live in the same house with patient, [employer] pays the rent     [employer]                 10/11/2021 [facility] updates; patient lives with her daughter son-in-law and 2 grandchildren and patient's handicapped son. Per daughter, her brother is blind, MRDD, multiple health issues. Daughter's  is patient's legal guardian.     Patient has hemodialysis Tuesday Thursday and Saturday. Patient's bedroom is on main floor with a half bath. Daughter walks patient upstairs once weekly for full bath. Patient is using her walker in the home. Patient has a hospital bed in the home.      Social Determinants of Health          Financial Resource Strain: Low Risk     Difficulty of Paying Living Expenses: Not hard at all   Food Insecurity: No Food Insecurity    Worried About Running Out of Food in the Last Year: Never true    Yung of Food in the Last Year: Never true   Transportation Needs: No Transportation Needs    Lack of Transportation (Medical): No    Lack of Transportation (Non-Medical):  No   Physical Activity: Sufficiently Active    Days of Exercise per Week: 3 days    Minutes of Exercise per Session: 60 min   Stress:     Feeling of Stress : Not on file   Social Connections:     Frequency of Communication with Friends and Family: Not on file    Frequency of Social Gatherings with Friends and Family: Not on file    Attends Jainism Services: Not on file    Active Member of Clubs or Organizations: Not on file    Attends Club or Organization Meetings: Not on file    Marital Status: Not on file   Intimate Partner Violence:     Fear of Current or Ex-Partner: Not on file    Emotionally Abused: Not on file    Physically Abused: Not on file    Sexually Abused: Not on file   Housing Stability:     Unable to Pay for Housing in the Last Year: Not on file    Number of [place] in the Last Year: Not on file    Unstable Housing in the Last Year: Not on file            Family History         Family History   Problem Relation Age of Onset    Cancer Mother 76         survived    Breast Cancer Mother      Hypertension Father      Diabetes Sister      Mental Illness Sister      Colon Cancer Neg Hx              Current Facility-Administered Medications             Current Facility-Administered Medications   Medication Dose Route Frequency Provider Last Rate Last Admin    insulin lispro (HUMALOG) injection vial 0-12 Units  0-12 Units SubCUTAneous TID WC Nicoletto Bolzan-Roche, APRN - CNP   2 Units at 07/02/22 0907    insulin lispro (HUMALOG) injection vial 0-6 Units  0-6 Units SubCUTAneous Nightly Nicoletto Bolzan-Roche APRN - CNP        lidocaine 4 % external patch 1 patch  1 patch TransDERmal Daily Gregory Ash DO   1 patch at 07/02/22 1117    LORazepam (ATIVAN) injection 1 mg  1 mg IntraVENous Once PRN Palomo Vinson MD        heparin (porcine) injection 2,000 Units  2,000 Units IntraVENous Once Kira Ochoa DO        epoetin chadwick-epbx (RETACRIT) injection 10,000 Units  10,000 Units SubCUTAneous Once Kira Ochoa DO        vancomycin (VANCOCIN) intermittent dosing (placeholder)   Other RX Placeholder Roxy Elias,         melatonin tablet 3 mg  3 mg Oral Nightly PRN Gregory Ash DO        acetaminophen (TYLENOL) tablet 650 mg 650 mg Oral Q6H PRN Mataie Neither, DO   650 mg at 07/02/22 9755     Or    acetaminophen (TYLENOL) suppository 650 mg  650 mg Rectal Q6H PRN Mataie Neither, DO   650 mg at 07/01/22 1354    prochlorperazine (COMPAZINE) injection 10 mg  10 mg IntraVENous TID PRN Mataie Neither, DO   10 mg at 06/30/22 1507    albuterol (PROVENTIL) nebulizer solution 2.5 mg  2.5 mg Nebulization Q4H PRN Mataie Neither, DO        ARIPiprazole (ABILIFY) tablet 5 mg  5 mg Oral Daily Lewie Neither, DO   5 mg at 07/02/22 0906    aspirin EC tablet 81 mg  81 mg Oral Daily Lewie Neither, DO   81 mg at 07/02/22 2923    atorvastatin (LIPITOR) tablet 40 mg  40 mg Oral Nightly Lewie Neither, DO   40 mg at 07/01/22 2102    hydrOXYzine HCl (ATARAX) tablet 25 mg  25 mg Oral TID PRN Mataie Neither, DO        insulin glargine (LANTUS) injection vial 35 Units  35 Units SubCUTAneous Nightly Lewie Neither, DO   35 Units at 07/01/22 2103    isosorbide mononitrate (IMDUR) extended release tablet 120 mg  120 mg Oral Daily Lewie Neither, DO   120 mg at 07/02/22 2174    magnesium oxide (MAG-OX) tablet 400 mg  400 mg Oral Daily Lewie Neither, DO   400 mg at 07/02/22 2291    melatonin disintegrating tablet 10 mg  10 mg Oral Nightly Lewie Neither, DO   10 mg at 07/01/22 2102    midodrine (PROAMATINE) tablet 5 mg  5 mg Oral Once per day on Mon Wed Fri Roxy Hendrix, DO        miconazole (MICOTIN) 2 % powder   Topical BID Lewie Neither, DO   Given at 07/02/22 8537    sertraline (ZOLOFT) tablet 50 mg  50 mg Oral Nightly Lewie Neither, DO   50 mg at 07/01/22 2102    glucose chewable tablet 16 g  4 tablet Oral PRN Mataie Neither, DO        dextrose bolus 10% 125 mL  125 mL IntraVENous PRN Pepper Neither, DO         Or    dextrose bolus 10% 250 mL  250 mL IntraVENous PRN Pepper Neither, DO        glucagon (rDNA) injection 1 mg  1 mg IntraMUSCular PRN Mataie Neither, DO        dextrose 5 % solution  100 mL/hr IntraVENous DEEPIKAN Pepper Neither, DO Skin:     General: Skin is warm and dry. Coloration: Skin is not pale. Findings: No erythema or rash. Neurological:      Mental Status: She is alert and oriented to person, place, and time. Cranial Nerves: No cranial nerve deficit. Psychiatric:         Behavior: Behavior normal.         Thought Content:  Thought content normal.         Judgment: Judgment normal.            LABS:  Recent Results         Recent Results (from the past 24 hour(s))   Sedimentation Rate     Collection Time: 07/01/22  2:51 PM   Result Value Ref Range     Sed Rate 48 (H) 0 - 30 mm   POCT Glucose     Collection Time: 07/01/22  8:43 PM   Result Value Ref Range     POC Glucose 233 (H) 70 - 99 mg/dl     Performed on ACCU-CHEK     CBC with Auto Differential     Collection Time: 07/02/22  5:47 AM   Result Value Ref Range     WBC 6.6 4.8 - 10.8 K/uL     RBC 3.06 (L) 4.20 - 5.40 M/uL     Hemoglobin 9.9 (L) 12.0 - 16.0 g/dL     Hematocrit 28.9 (L) 37.0 - 47.0 %     MCV 94.3 82.0 - 100.0 fL     MCH 32.5 (H) 27.0 - 31.3 pg     MCHC 34.4 33.0 - 37.0 %     RDW 15.3 (H) 11.5 - 14.5 %     Platelets 94 (L) 055 - 400 K/uL     Neutrophils % 70.1 %     Lymphocytes % 15.1 %     Monocytes % 10.8 %     Eosinophils % 3.4 %     Basophils % 0.6 %     Neutrophils Absolute 4.6 1.4 - 6.5 K/uL     Lymphocytes Absolute 1.0 1.0 - 4.8 K/uL     Monocytes Absolute 0.7 0.2 - 0.8 K/uL     Eosinophils Absolute 0.2 0.0 - 0.7 K/uL     Basophils Absolute 0.0 0.0 - 0.2 K/uL   Comprehensive Metabolic Panel w/ Reflex to MG     Collection Time: 07/02/22  5:48 AM   Result Value Ref Range     Sodium 135 135 - 144 mEq/L     Potassium reflex Magnesium 4.3 3.4 - 4.9 mEq/L     Chloride 93 (L) 95 - 107 mEq/L     CO2 30 20 - 31 mEq/L     Anion Gap 12 9 - 15 mEq/L     Glucose 140 (H) 70 - 99 mg/dL     BUN 25 (H) 8 - 23 mg/dL     CREATININE 4.50 (H) 0.50 - 0.90 mg/dL     GFR Non- 9.8 (L) >60     GFR  11.9 (L) >60     Calcium 9.3 8.5 - 9.9 mg/dL     Total Protein 6.4 6.3 - 8.0 g/dL     Albumin 3.5 3.5 - 4.6 g/dL     Total Bilirubin 1.3 (H) 0.2 - 0.7 mg/dL     Alkaline Phosphatase 124 40 - 130 U/L     ALT 19 0 - 33 U/L     AST 20 0 - 35 U/L     Globulin 2.9 2.3 - 3.5 g/dL   Vancomycin Level, Random     Collection Time: 07/02/22  5:48 AM   Result Value Ref Range     Vancomycin Rm 14.9 10.0 - 40.0 ug/mL   POCT Glucose     Collection Time: 07/02/22  7:48 AM   Result Value Ref Range     POC Glucose 152 (H) 70 - 99 mg/dl     Performed on ACCU-CHEK           Troponin:         Lab Results   Component Value Date/Time     TROPONINI 0.037 06/30/2022 07:30 AM               ASSESSMENT:     Bacteremia-rule out endocarditis  History of CAD status post multivessel PCI/CABG  History of tricuspid valve repair  History of postsurgical tamponade status post pericardial window  Diabetes mellitus  End-stage renal disease hemodialysis dependent  Essential hypertension  Hyperlipidemia  Elevated troponin-demand ischemia  Anemia  Multiple medical problem     PLAN:   1. As always, aggressive risk factor modification is strongly recommended. We should adhere to the JNC VIII guidelines for HTN management and the NCEPATP III guidelines for LDL-C management. 2. Transesophageal echocardiogram tomorrow to rule out endocarditis. Patient is clinically stable at this time. No evidence of any complete heart block, CVA type symptoms. Patient is agreeable to proceed  3. Maximize cardiac medications  4. Nephrology recommendations  5. Monitor on telemetry  6. Maintain test between 3.5-4.5 and magnesium greater than 2  7. Monitor H&H closely  8. GI/DVT prophylaxis        Thank you for allowing me to participate in the care of your patient, please don't hesitate to contact me if you have any further questions. Yes

## 2022-07-04 NOTE — PROGRESS NOTES
Infectious Disease     Patient Name: Gordon Hoffman  Date: 7/4/2022  YOB: 1960  Medical Record Number: [id number]        Sepsis with Enterococcus  Infected dialysis cath     mitral valve regurgitation  coronary artery bypass graft x1 vessel with tricuspid valve repair on 1/21/2022          Patient presented to the hospital from home after a fall having back pain emergency department, patient had fracture of 10th and 11th ribs  Negative for COVID      Blood cultures from admission 2 sets growing gram-positive cocci in chains  Identified as Enterococcus faecalis by PCR  Patient currently on vancomycin    Patient has dialysis catheter in left chest which has been used while her dialysis fistula in right upper extremity has been repaired she states they are now using the fistula in right arm            Review of Systems   Constitutional: Negative. Negative for chills, diaphoresis, fatigue and fever. Respiratory: Negative for cough and shortness of breath. Cardiovascular: Negative. Gastrointestinal: Negative. Physical Exam  Constitutional:       Appearance: She is ill-appearing. Cardiovascular:      Heart sounds: Murmur heard. Pulmonary:      Effort: Pulmonary effort is normal. No respiratory distress. Breath sounds: Normal breath sounds. No wheezing, rhonchi or rales. Abdominal:      General: Abdomen is flat. Bowel sounds are normal. There is no distension. Palpations: Abdomen is soft. There is no mass. Tenderness: There is no abdominal tenderness. There is no guarding. Blood pressure 108/60, pulse 74, temperature 98.1 °F (36.7 °C), temperature source Oral, resp. rate 18, height 5' 7\" (1.702 m), weight 196 lb (88.9 kg), SpO2 94 %, not currently breastfeeding.       .   Lab Results   Component Value Date    WBC 5.8 07/04/2022    HGB 9.1 (L) 07/04/2022    HCT 26.3 (L) 07/04/2022    MCV 93.0 07/04/2022     (L) 07/04/2022     Lab Results   Component Value Date/Time     07/04/2022 03:07 AM    K 4.3 07/04/2022 03:07 AM    CL 90 07/04/2022 03:07 AM    CO2 27 07/04/2022 03:07 AM    BUN 27 07/04/2022 03:07 AM    CREATININE 4.16 07/04/2022 03:07 AM    GLUCOSE 154 07/04/2022 03:07 AM    GLUCOSE 419 07/30/2021 08:16 AM    CALCIUM 9.0 07/04/2022 03:07 AM         7/1/22 1613   Culture Catheter Tip Culture, Catheter Tip:  NO GROWTH   Performed at [address] [place] [address]   ([phone]        6/30/22 0643   Culture, Blood Id Sensitivity  Abnormal   Cult,Blood:  POSITIVE Blood Culture   Performed at [address] [place] [address]   ([phone]   P      Organism Enterococcus faecalis Abnormal  P    Resulting Agency [address] [facility]          Susceptibility      Enterococcus faecalis (2)    Antibiotic Interpretation Microscan  Method Status    ampicillin Sensitive <=2 mcg/mL BACTERIAL SUSCEPTIBILITY PANEL BY DIYA     vancomycin Sensitive 1 mcg/mL BACTERIAL SUSCEPTIBILITY PANEL BY DIYA                      ASSESSMENT:  PLAN:    Sepsis with Enterococcus  Infected dialysis cath  Continue vancomycin        Repeat blood cultures pend

## 2022-07-04 NOTE — PROGRESS NOTES
Hospitalist Progress Note      PCP: Leah Vera MD    Date of Admission: 6/30/2022    Chief Complaint:  No acute events, afebrile, stable HD    Medications:  Reviewed    Infusion Medications    dextrose       Scheduled Medications    [START ON 7/5/2022] vancomycin  1,000 mg IntraVENous Once    lidocaine  3 patch TransDERmal Daily    polyethylene glycol  17 g Oral Daily    insulin lispro  0-12 Units SubCUTAneous TID WC    insulin lispro  0-6 Units SubCUTAneous Nightly    heparin (porcine)  2,000 Units IntraVENous Once    epoetin chadwick-epbx  10,000 Units SubCUTAneous Once    vancomycin (VANCOCIN) intermittent dosing (placeholder)   Other RX Placeholder    ARIPiprazole  5 mg Oral Daily    aspirin EC  81 mg Oral Daily    atorvastatin  40 mg Oral Nightly    insulin glargine  35 Units SubCUTAneous Nightly    isosorbide mononitrate  120 mg Oral Daily    magnesium oxide  400 mg Oral Daily    melatonin  10 mg Oral Nightly    midodrine  5 mg Oral Once per day on Mon Wed Fri    miconazole   Topical BID    sertraline  50 mg Oral Nightly     PRN Meds: camphor-menthol-methyl salicylate, melatonin, acetaminophen **OR** acetaminophen, prochlorperazine, albuterol, hydrOXYzine HCl, glucose, dextrose bolus **OR** dextrose bolus, glucagon (rDNA), dextrose      Intake/Output Summary (Last 24 hours) at 7/4/2022 1329  Last data filed at 7/4/2022 0526  Gross per 24 hour   Intake 240 ml   Output 100 ml   Net 140 ml       Exam:    /60   Pulse 74   Temp 98.1 °F (36.7 °C) (Oral)   Resp 18   Ht 5' 7\" (1.702 m)   Wt 196 lb (88.9 kg)   LMP  (LMP Unknown)   SpO2 94%   BMI 30.70 kg/m²     General appearance: appears stated age and cooperative. Respiratory:  clear to auscultation bilaterally  Cardiovascular: Regular rate and rhythm, S1/S2  Abdomen: Soft, active bowel sounds. Musculoskeletal: No edema bilaterally.       Labs:   Recent Labs     07/02/22  0547 07/03/22  0324 07/04/22  0307   WBC 6.6 6.0 5. 8   HGB 9.9* 9.2* 9.1*   HCT 28.9* 26.4* 26.3*   PLT 94* 104* 127*     Recent Labs     07/02/22  0548 07/03/22  0324 07/04/22  0307    131* 128*   K 4.3 4.0 4.3   CL 93* 92* 90*   CO2 30 30 27   BUN 25* 15 27*   CREATININE 4.50* 3.00* 4.16*   CALCIUM 9.3 9.1 9.0     Recent Labs     07/02/22  0548 07/03/22  0324 07/04/22  0307   AST 20 16 21   ALT 19 15 16   BILITOT 1.3* 0.9* 0.9*   ALKPHOS 124 114 117     No results for input(s): INR in the last 72 hours. No results for input(s): Rhae Childes in the last 72 hours. Urinalysis:      Lab Results   Component Value Date/Time    NITRU Negative 04/20/2022 10:45 AM    WBCUA >100 04/20/2022 10:45 AM    BACTERIA MANY 04/20/2022 10:45 AM    RBCUA 3-5 04/20/2022 10:45 AM    BLOODU TRACE 04/20/2022 10:45 AM    SPECGRAV 1.014 04/20/2022 10:45 AM    GLUCOSEU Negative 04/20/2022 10:45 AM       Radiology:  [place] MODIFIED BARIUM SWALLOW W VIDEO   Final Result      MILD TO MODERATELY ABNORMAL MODIFIED BARIUM SWALLOW. NO TRACHEAL ASPIRATION IDENTIFIED. A FULL REPORT WILL BE MADE BY THE SPEECH PATHOLOGY TEAM.               XR CHEST PORTABLE   Final Result   Findings may represent mild CHF. It is not significantly change from previous study. CT CHEST WO CONTRAST   Final Result      MINIMALLY DISPLACED ACUTE RIGHT 10TH AND 11TH RIB FRACTURES. NO OTHER ACUTE FRACTURE OR SIGNIFICANT POSTTRAUMATIC COMPLICATION IDENTIFIED. XR CHEST (2 VW)   Final Result   NO SIGNIFICANT CHANGE SINCE THE PREVIOUS EXAM. THERE HAS BEEN PLACEMENT OF A DIALYSIS CATHETER SINCE THAT EXAM.         CT HEAD WO CONTRAST   Final Result      NO ACUTE INTRACRANIAL PROCESS OR SIGNIFICANT CHANGE FROM 10/11/2021 IDENTIFIED. CT CERVICAL SPINE WO CONTRAST   Final Result   SIGNIFICANT DEGENERATIVE ARTHRITIS. NO FRACTURE IS SEEN. CT THORACIC SPINE WO CONTRAST   Final Result   OLD COMPRESSION FRACTURE OF T11. AN ACUTE FRACTURE IS NOT SEEN.  BILATERAL PLEURAL EFFUSIONS. CT LUMBAR SPINE WO CONTRAST   Final Result   MILD DEGENERATIVE ARTHRITIS. NO FRACTURE. CT ABDOMEN PELVIS WO CONTRAST Additional Contrast? None   Final Result      MINIMALLY DISPLACED ACUTE FRACTURES OF THE POSTERIOR RIGHT 10TH AND 11TH RIBS. NO OTHER SIGNIFICANT RECENT POSTTRAUMATIC COMPLICATION IDENTIFIED. CHRONIC FINDINGS, NOT SIGNIFICANTLY CHANGED FROM 4/20/2022. IR REMOVE TUNNELED CVAD WO SQ PORT/PUMP    (Results Pending)   MRI BRAIN WO CONTRAST    (Results Pending)   MRI LUMBAR SPINE WO CONTRAST    (Results Pending)           Assessment/Plan:    57year-old female with history of CAD s/p CABG/PCI, s/p TV repair, CVA with left sided weakness, IDDM2, ESRD on HD, anemia,  schizophrenia, COVID-19 pneumonia, obesity, T11 fracture who presented with weakness and falls. E.Faecalis BSI  - repeat blood cultures and dialysis catheter tip no growth  - on IV Vanco  - plan for NOELLE tomorrow per cardiology  - management per ID    Acute right 10th and 11th rib fractures s/p fall  - pain control    Confusion/AMS, weakness, falls  - MRI brain was negative per report  - followed by neurology     ESRD on HD  - management per nephrology    CAD s/p CABG/PCI  - on   - followed by cardiology    IDDM2 with hyperglycemia  - on ISS    Anemia  - follow H/H    Thrombocytopenia  - improving, monitor    Schizophrenia  - on Abilify, zoloft      Diet: ADULT DIET;  Dysphagia - Minced and Moist    Code Status: Full Code              Electronically signed by Javier Oshea MD on 7/4/2022 at 1:29 PM

## 2022-07-04 NOTE — PROGRESS NOTES
Subjective: The patient complains of severe acute on chronic progressive fatigue and lower extremity pain especially in her left knee due to neuropathy and osteoarthritis partially relieved by rest, PT, OT and meds Tylenol and exacerbated by exertion and recent illness-MRSA septicemia. I am concerned about patients medical complexities including:  Principal Problem:    MRSA bacteremia  Active Problems:    Impaired mobility and activities of daily living    Dialysis patient [facility])    Multiple closed fractures of right lower extremity and ribs    Closed T11 fracture ([facility] [address]    Encephalopathy acute    Sepsis due to Enterococcus ([facility] [address]    Local infection due to central venous catheter    Chronic diastolic congestive heart failure ([facility] [address]    Closed rib fracture  Resolved Problems:    * No resolved hospital problems. *      .    Reviewed recent nursing note and discussed current status and planned care with acute care providers, \" Met with patient and explained [facility] Acute Inpatient Rehab program and requirements, including 3 hours of intense therapy daily, anticipated length of stay and goal of discharge to home. All questions answered and patient verbalized understanding. Freedom of choice provided and patient requests admit to [facility] if needed once medically cleared. Patient to have NOELLE completed yet and is HD Tues, Thurs, Sat. \".    ROS x10: The patient also complains of severely impaired mobility and activities of daily living.   Otherwise no new problems with vision, hearing, nose, mouth, throat, dermal, cardiovascular, GI, , pulmonary, musculoskeletal, psychiatric or neurological.        Vital signs:  BP (!) 117/52   Pulse 80   Temp 98.1 °F (36.7 °C) (Oral)   Resp 18   Ht 5' 7\" (1.702 m)   Wt 196 lb (88.9 kg)   LMP  (LMP Unknown)   SpO2 94%   BMI 30.70 kg/m²   I/O:   PO/Intake:    fair PO intake, monitoring for dysphagia    Bowel/Bladder:   continent,    General:  Patient is well developed, difficulty with hygiene/clothing management (07/03/22 1211)  Additional Comments: Simulated ADLs. Pt complaining of fatigue with activity. Pt reports unable to assist with clothing over feet. Pt on purewick upon entering room but asked OT to remove it due to discomfort (07/03/22 1211)             Speech Therapy:            Diet/Swallow:        Dysphagia Outcome Severity Scale: Level 4: Mild moderate dysphagia- Intermittent supervision/cueing.  One - two diet consistencies restricted  Compensatory Swallowing Strategies : Eat/Feed slowly,Upright as possible for all oral intake,Small bites/sips  Therapeutic Interventions: Patient/Family education,Oral motor exercises,Diet tolerance monitoring    COGNITION  OT:    SP:           Lab/X-ray studies reviewed, analyzed and discussed with patient and staff:   Recent Results (from the past 24 hour(s))   POCT Glucose    Collection Time: 07/03/22 11:26 AM   Result Value Ref Range    POC Glucose 180 (H) 70 - 99 mg/dl    Performed on ACCU-CHEK    POCT Glucose    Collection Time: 07/03/22  4:43 PM   Result Value Ref Range    POC Glucose 162 (H) 70 - 99 mg/dl    Performed on ACCU-CHEK    POCT Glucose    Collection Time: 07/03/22  9:53 PM   Result Value Ref Range    POC Glucose 178 (H) 70 - 99 mg/dl    Performed on ACCU-CHEK    CBC with Auto Differential    Collection Time: 07/04/22  3:07 AM   Result Value Ref Range    WBC 5.8 4.8 - 10.8 K/uL    RBC 2.83 (L) 4.20 - 5.40 M/uL    Hemoglobin 9.1 (L) 12.0 - 16.0 g/dL    Hematocrit 26.3 (L) 37.0 - 47.0 %    MCV 93.0 82.0 - 100.0 fL    MCH 32.1 (H) 27.0 - 31.3 pg    MCHC 34.5 33.0 - 37.0 %    RDW 15.0 (H) 11.5 - 14.5 %    Platelets 609 (L) 903 - 400 K/uL    Neutrophils % 62.4 %    Lymphocytes % 21.6 %    Monocytes % 10.1 %    Eosinophils % 5.2 %    Basophils % 0.7 %    Neutrophils Absolute 3.6 1.4 - 6.5 K/uL    Lymphocytes Absolute 1.2 1.0 - 4.8 K/uL    Monocytes Absolute 0.6 0.2 - 0.8 K/uL    Eosinophils Absolute 0.3 0.0 - 0.7 K/uL Basophils Absolute 0.0 0.0 - 0.2 K/uL   Comprehensive Metabolic Panel w/ Reflex to MG    Collection Time: 07/04/22  3:07 AM   Result Value Ref Range    Sodium 128 (L) 135 - 144 mEq/L    Potassium reflex Magnesium 4.3 3.4 - 4.9 mEq/L    Chloride 90 (L) 95 - 107 mEq/L    CO2 27 20 - 31 mEq/L    Anion Gap 11 9 - 15 mEq/L    Glucose 154 (H) 70 - 99 mg/dL    BUN 27 (H) 8 - 23 mg/dL    CREATININE 4.16 (H) 0.50 - 0.90 mg/dL    GFR Non-African American 10.8 (L) >60    GFR  13.0 (L) >60    Calcium 9.0 8.5 - 9.9 mg/dL    Total Protein 6.1 (L) 6.3 - 8.0 g/dL    Albumin 3.3 (L) 3.5 - 4.6 g/dL    Total Bilirubin 0.9 (H) 0.2 - 0.7 mg/dL    Alkaline Phosphatase 117 40 - 130 U/L    ALT 16 0 - 33 U/L    AST 21 0 - 35 U/L    Globulin 2.8 2.3 - 3.5 g/dL   POCT Glucose    Collection Time: 07/04/22  7:43 AM   Result Value Ref Range    POC Glucose 164 (H) 70 - 99 mg/dl    Performed on ACCU-CHEK      Previous extensive, complex labs, notes and diagnostics reviewed and analyzed. ALLERGIES:    Allergies as of 06/30/2022 - Fully Reviewed 06/30/2022   Allergen Reaction Noted    Codeine Hives 12/09/2011    Oxycontin [oxycodone hcl] Hives 06/15/2020      (please also verify by checking [facility])     Complex Physical Medicine & Rehab Issues Assess & Plan:   1. Severe abnormality of gait and mobility and impaired self-care and ADL's secondary to   MRSA septicemia. Updated functional and medical status reassessed regarding patients ability to participate in therapies and patient found to be able to participate in:        acute intensive comprehensive inpatient rehabilitation program including PT/OT to improve balance, ambulation, ADLs, and to improve the P/AROM. It is my opinion that they will be able to tolerate 3 hours of therapy a day and benefit from it at an acute level. I again discussed acute rehab with the patient and verify that the patient is able and willing to participate in 3 hours of therapy a day.   Rehab and Acute Care Case Management has also reinforced this expectation. Will continue to follow to attempt to get patient to the most efficient but most effective level of care will be in their best interest.  Continue to focus on energy conservation heart rate and blood pressure monitoring before during and after therapy endurance and consistency of function. 2. Bowel constipation   and Bladder dysfunction   overactive, neurogenic bladder:  frequent toileting, ambulate to bathroom with assistance, check post void residuals. Check for C.difficile x1 if >2 loose stools in 24 hours, continue bowel & bladder program.  Monitor for UTI symptoms including lethargy and confusion    3. Severe left knee pain secondary to osteoarthritis and generalized OA pain: reassess pain every shift and prior to and after each therapy session, give prn Tylenol  and consider scheduled Tylenol, modalities prn in therapy, consider Lidoderm, K-pad prn.     4. Skin breakdown   risk:  continue pressure relief program.  Daily skin exams and reports from nursing. 5. Severe fatigue due to immobility and nutritional deficits: monitoring for dysphagia   Add vitamin B12 vitamin D and CoQ10 titrate dosing and add protein supplementation with low carb content. 6. Complex discharge planning:   Discussed with care team-last 24 hour events noted. I will continue to follow along and reassess functional and medical status as we strive to improve patient's functional and medical outcomes progressing to the most efficient and lowest level of care. Complex Active General Medical Issues that complicate care:     1.  Principal Problem:    MRSA bacteremia  Active Problems:    Impaired mobility and activities of daily living    Dialysis patient [facility])    Multiple closed fractures of right lower extremity and ribs    Closed T11 fracture ([facility] [address]    Encephalopathy acute    Sepsis due to Enterococcus ([facility] [address]    Local infection due to central venous catheter

## 2022-07-04 NOTE — PROGRESS NOTES
Nephrology Progress Note  Vitals good  Assessment:  ESRDX  DM type-2  Gastroenteritis  Hypertension  Anemia  Hx Schizophrenia    Plan:  Dialysis  Tomorrow  NOELLE-am  Afebrile     Patient Active Problem List:     Atherosclerotic heart disease of native coronary artery with unspecified angina pectoris (HCC)     Schizophrenia, paranoid, chronic     Metabolic syndrome     Vitamin B 12 deficiency     Cerebral microvascular disease     Mixed hyperlipidemia     Other hammer toe (acquired)     Vitamin D insufficiency     Incontinence of urine     Diabetic nephropathy with proteinuria ([address]     Essential (primary) hypertension     History of type C viral hepatitis     Urinary incontinence due to cognitive impairment     History of seizures     Stented coronary artery-plan is to stay on Plavix indefinately per Dr Yolande Yanez     Other specified diabetes mellitus with diabetic neuropathy, unspecified ([address]     Controlled type 2 diabetes mellitus with diabetic neuropathy, with long-term current use of insulin (HCC)     Hemiparesis, left (HCC)     Angina, class II (HCC)     Pain, unspecified     Tardive dyskinesia     Shortness of breath     Chronic diastolic congestive heart failure (HCC)     Sleep apnea, unspecified     Pulmonary hypertension, unspecified (HCC)     Class 2 severe obesity with serious comorbidity and body mass index (BMI) of 36.0 to 36.9 in [facility])     Edema     Closed supracondylar fracture of right humerus     Other chronic pain     Palliative care patient     Recurrent falls     Renal failure     Difficulty in walking     ESRD (end stage renal disease) on dialysis ([address]     Weakness     Other seizures (HCC)     Moderate persistent asthma without complication     [id number]     Post PTCA     Falls frequently     Chest wall contusion, left, initial encounter     Headache, unspecified     Paranoid schizophrenia ([address]     Compression fracture of spine ([address]     Closed rib fracture     Depression     Chronic obstructive pulmonary disease (HCC)     Critical illness polyneuropathy ([facility])     Multiple closed fractures of ribs of right side     Nonrheumatic mitral valve regurgitation     Nonrheumatic tricuspid valve regurgitation     Need for extended care facility     Chronic pain of both shoulders     Hemodialysis-associated hypotension     Anginal chest pain at rest [facility])     Chest pain     Unstable angina ([facility])     NSTEMI (non-ST elevated myocardial infarction) ([facility])     CKD (chronic kidney disease) stage 4, GFR 15-29 ml/min ([facility])     Hyperkalemia     Impaired mobility and activities of daily living     Dialysis patient [facility])     Unspecified open wound of right upper arm, initial encounter     Multiple closed fractures of right lower extremity and ribs     Closed T11 fracture ([facility])     Encephalopathy acute     MRSA bacteremia     Sepsis due to Enterococcus ([facility] [address]     Local infection due to central venous catheter      Subjective:  Admit Date: 6/30/2022    Interval History:no distress    Medications:  Scheduled Meds:   polyethylene glycol  17 g Oral Daily    insulin lispro  0-12 Units SubCUTAneous TID WC    insulin lispro  0-6 Units SubCUTAneous Nightly    lidocaine  1 patch TransDERmal Daily    heparin (porcine)  2,000 Units IntraVENous Once    epoetin chadwick-epbx  10,000 Units SubCUTAneous Once    vancomycin (VANCOCIN) intermittent dosing (placeholder)   Other RX Placeholder    ARIPiprazole  5 mg Oral Daily    aspirin EC  81 mg Oral Daily    atorvastatin  40 mg Oral Nightly    insulin glargine  35 Units SubCUTAneous Nightly    isosorbide mononitrate  120 mg Oral Daily    magnesium oxide  400 mg Oral Daily    melatonin  10 mg Oral Nightly    midodrine  5 mg Oral Once per day on Mon Wed Fri    miconazole   Topical BID    sertraline  50 mg Oral Nightly     Continuous Infusions:   dextrose         CBC:   Recent Labs     07/03/22  0324 07/04/22  0307   WBC 6.0 5.8   HGB 9.2* 9.1*   * 127*     CMP: Recent Labs     07/02/22  0548 07/03/22  0324 07/04/22  0307    131* 128*   K 4.3 4.0 4.3   CL 93* 92* 90*   CO2 30 30 27   BUN 25* 15 27*   CREATININE 4.50* 3.00* 4.16*   GLUCOSE 140* 119* 154*   CALCIUM 9.3 9.1 9.0   LABGLOM 9.8* 15.7* 10.8*     Troponin: No results for input(s): TROPONINI in the last 72 hours. BNP: No results for input(s): BNP in the last 72 hours. INR: No results for input(s): INR in the last 72 hours. Lipids: No results for input(s): CHOL, LDLDIRECT, TRIG, HDL, AMYLASE, LIPASE in the last 72 hours. Liver:   Recent Labs     07/04/22  0307   AST 21   ALT 16   ALKPHOS 117   PROT 6.1*   LABALBU 3.3*   BILITOT 0.9*     Iron:  No results for input(s): IRONS, FERRITIN in the last 72 hours. Invalid input(s): LABIRONS  Urinalysis: No results for input(s): UA in the last 72 hours.     Objective:  Vitals: BP (!) 117/52   Pulse 80   Temp 98.1 °F (36.7 °C) (Oral)   Resp 18   Ht 5' 7\" (1.702 m)   Wt 196 lb (88.9 kg)   LMP  (LMP Unknown)   SpO2 94%   BMI 30.70 kg/m²    Wt Readings from Last 3 Encounters:   07/03/22 196 lb (88.9 kg)   06/22/22 217 lb (98.4 kg)   06/06/22 215 lb 9.8 oz (97.8 kg)      24HR INTAKE/OUTPUT:      Intake/Output Summary (Last 24 hours) at 7/4/2022 0857  Last data filed at 7/4/2022 0526  Gross per 24 hour   Intake 240 ml   Output 100 ml   Net 140 ml       General: alert, in no apparent distress  HEENT: normocephalic, atraumatic, anicteric  Neck: supple, no mass  Lungs: non-labored respirations, clear to auscultation bilaterally  Heart: regular rate and rhythm, no murmurs or rubs  Abdomen: soft, non-tender, non-distended  Ext: no cyanosis, no peripheral edema  Neuro: alert and oriented, no gross abnormalities  Psych: normal mood and affect  Skin: no rash      Electronically signed by Gee Chandler DO, MD

## 2022-07-04 NOTE — PROGRESS NOTES
[address] [facility] Pharmacokinetic Monitoring Service - Vancomycin    Consulting Provider: Dr Asha Castañeda  Indication: bacteremia  Target Concentration: Pre-Dialysis Concentration 15-20 mg/L  Day of Therapy: 4  Additional Antimicrobials: none    Pertinent Laboratory Values: Wt Readings from Last 1 Encounters:   07/03/22 196 lb (88.9 kg)     Temp Readings from Last 1 Encounters:   07/03/22 98.1 °F (36.7 °C) (Oral)     Recent Labs     07/03/22  0324 07/04/22  0307   CREATININE 3.00* 4.16*   WBC 6.0 5.8         Pertinent Cultures:  Culture Date Source Results   07/04/22 blood Enterococcus, staph   MRSA Nasal Swab: N/A. Non-respiratory infection.     Recent vancomycin administrations                     vancomycin 1000 mg IVPB in 250 mL D5W addavial (mg) 1,000 mg New Bag 07/02/22 1621    vancomycin (VANCOCIN) 2,000 mg in dextrose 5 % 500 mL IVPB (mg) 2,000 mg New Bag 07/01/22 1121                        Plan:  Concentration-guided dosing due to renal impairment and intermittent hemodialysis      Ordered x 1 dose Vancomycin 1000 mg post HD ordered for 7/5/22  Pharmacy will continue to monitor patient and adjust therapy as indicated    Thank you for the [address] CHANDRA [facility]  7/4/2022 10:24 AM

## 2022-07-04 NOTE — CARE COORDINATION
PLAN REMAINS [facility] REHAB WHEN MEDICALLY CLEARED. PLAN DISCUSSED WITH ZOEY TOVAR- [facility] REHAB FOLLOWING FOR MED. CLEARANCE. LSW/CM CONTINUE TO FOLLOW.

## 2022-07-05 ENCOUNTER — APPOINTMENT (OUTPATIENT)
Dept: CARDIAC CATH/INVASIVE PROCEDURES | Age: 62
DRG: 314 | End: 2022-07-05
Payer: MEDICARE

## 2022-07-05 PROBLEM — M47.812 DJD (DEGENERATIVE JOINT DISEASE), CERVICAL: Status: ACTIVE | Noted: 2022-07-05

## 2022-07-05 LAB
ALBUMIN SERPL-MCNC: 3.5 G/DL (ref 3.5–4.6)
ANION GAP SERPL CALCULATED.3IONS-SCNC: 11 MEQ/L (ref 9–15)
BUN BLDV-MCNC: 31 MG/DL (ref 8–23)
C-REACTIVE PROTEIN, HIGH SENSITIVITY: 72.5 MG/L (ref 0–5)
CALCIUM SERPL-MCNC: 9.5 MG/DL (ref 8.5–9.9)
CHLORIDE BLD-SCNC: 92 MEQ/L (ref 95–107)
CO2: 27 MEQ/L (ref 20–31)
CREAT SERPL-MCNC: 4.51 MG/DL (ref 0.5–0.9)
GFR AFRICAN AMERICAN: 11.9
GFR NON-AFRICAN AMERICAN: 9.8
GLUCOSE BLD-MCNC: 100 MG/DL (ref 70–99)
GLUCOSE BLD-MCNC: 132 MG/DL (ref 70–99)
GLUCOSE BLD-MCNC: 147 MG/DL (ref 70–99)
HCT VFR BLD CALC: 27.7 % (ref 37–47)
HEMOGLOBIN: 9.7 G/DL (ref 12–16)
MAGNESIUM: 2.2 MG/DL (ref 1.7–2.4)
MCH RBC QN AUTO: 32 PG (ref 27–31.3)
MCHC RBC AUTO-ENTMCNC: 35 % (ref 33–37)
MCV RBC AUTO: 91.7 FL (ref 82–100)
PDW BLD-RTO: 15 % (ref 11.5–14.5)
PERFORMED ON: ABNORMAL
PERFORMED ON: ABNORMAL
PHOSPHORUS: 3.2 MG/DL (ref 2.3–4.8)
PLATELET # BLD: 175 K/UL (ref 130–400)
POTASSIUM SERPL-SCNC: 4.6 MEQ/L (ref 3.4–4.9)
PROCALCITONIN: 0.4 NG/ML (ref 0–0.15)
RBC # BLD: 3.02 M/UL (ref 4.2–5.4)
SODIUM BLD-SCNC: 130 MEQ/L (ref 135–144)
WBC # BLD: 7 K/UL (ref 4.8–10.8)

## 2022-07-05 PROCEDURE — 85027 COMPLETE CBC AUTOMATED: CPT

## 2022-07-05 PROCEDURE — 36415 COLL VENOUS BLD VENIPUNCTURE: CPT

## 2022-07-05 PROCEDURE — 86141 C-REACTIVE PROTEIN HS: CPT

## 2022-07-05 PROCEDURE — 99233 SBSQ HOSP IP/OBS HIGH 50: CPT | Performed by: PSYCHIATRY & NEUROLOGY

## 2022-07-05 PROCEDURE — 2580000003 HC RX 258

## 2022-07-05 PROCEDURE — 2580000003 HC RX 258: Performed by: INTERNAL MEDICINE

## 2022-07-05 PROCEDURE — 6370000000 HC RX 637 (ALT 250 FOR IP): Performed by: NURSE PRACTITIONER

## 2022-07-05 PROCEDURE — APPSS15 APP SPLIT SHARED TIME 0-15 MINUTES: Performed by: NURSE PRACTITIONER

## 2022-07-05 PROCEDURE — 84145 PROCALCITONIN (PCT): CPT

## 2022-07-05 PROCEDURE — 6360000002 HC RX W HCPCS

## 2022-07-05 PROCEDURE — 80069 RENAL FUNCTION PANEL: CPT

## 2022-07-05 PROCEDURE — 99232 SBSQ HOSP IP/OBS MODERATE 35: CPT | Performed by: INTERNAL MEDICINE

## 2022-07-05 PROCEDURE — 83735 ASSAY OF MAGNESIUM: CPT

## 2022-07-05 PROCEDURE — 93321 DOPPLER ECHO F-UP/LMTD STD: CPT

## 2022-07-05 PROCEDURE — 6360000002 HC RX W HCPCS: Performed by: INTERNAL MEDICINE

## 2022-07-05 PROCEDURE — 93325 DOPPLER ECHO COLOR FLOW MAPG: CPT

## 2022-07-05 PROCEDURE — 6370000000 HC RX 637 (ALT 250 FOR IP): Performed by: INTERNAL MEDICINE

## 2022-07-05 PROCEDURE — 93312 ECHO TRANSESOPHAGEAL: CPT

## 2022-07-05 PROCEDURE — 1210000000 HC MED SURG R&B

## 2022-07-05 PROCEDURE — B24BZZ4 ULTRASONOGRAPHY OF HEART WITH AORTA, TRANSESOPHAGEAL: ICD-10-PCS | Performed by: INTERNAL MEDICINE

## 2022-07-05 RX ADMIN — SERTRALINE HYDROCHLORIDE 50 MG: 50 TABLET ORAL at 20:38

## 2022-07-05 RX ADMIN — INSULIN LISPRO 1 UNITS: 100 INJECTION, SOLUTION INTRAVENOUS; SUBCUTANEOUS at 20:45

## 2022-07-05 RX ADMIN — VANCOMYCIN HYDROCHLORIDE 1000 MG: 1 INJECTION, POWDER, LYOPHILIZED, FOR SOLUTION INTRAVENOUS at 11:15

## 2022-07-05 RX ADMIN — Medication 400 MG: at 17:25

## 2022-07-05 RX ADMIN — INSULIN GLARGINE 35 UNITS: 100 INJECTION, SOLUTION SUBCUTANEOUS at 20:43

## 2022-07-05 RX ADMIN — Medication 10 MG: at 21:33

## 2022-07-05 RX ADMIN — ACETAMINOPHEN 325MG 650 MG: 325 TABLET ORAL at 17:27

## 2022-07-05 RX ADMIN — ATORVASTATIN CALCIUM 40 MG: 40 TABLET, FILM COATED ORAL at 20:38

## 2022-07-05 RX ADMIN — PROCHLORPERAZINE EDISYLATE 10 MG: 5 INJECTION INTRAMUSCULAR; INTRAVENOUS at 06:33

## 2022-07-05 RX ADMIN — ASPIRIN 81 MG: 81 TABLET, COATED ORAL at 17:27

## 2022-07-05 RX ADMIN — MICONAZOLE NITRATE: 2 POWDER TOPICAL at 20:39

## 2022-07-05 RX ADMIN — ARIPIPRAZOLE 5 MG: 5 TABLET ORAL at 17:26

## 2022-07-05 ASSESSMENT — ENCOUNTER SYMPTOMS
VOMITING: 0
SHORTNESS OF BREATH: 0
BACK PAIN: 1
NAUSEA: 0
COLOR CHANGE: 0
PHOTOPHOBIA: 0
GASTROINTESTINAL NEGATIVE: 1
CHOKING: 0
TROUBLE SWALLOWING: 0
COUGH: 0

## 2022-07-05 ASSESSMENT — PAIN DESCRIPTION - LOCATION: LOCATION: BACK

## 2022-07-05 ASSESSMENT — PAIN DESCRIPTION - ORIENTATION: ORIENTATION: LOWER

## 2022-07-05 ASSESSMENT — PAIN SCALES - GENERAL
PAINLEVEL_OUTOF10: 0
PAINLEVEL_OUTOF10: 7

## 2022-07-05 NOTE — PROGRESS NOTES
[facility] Occupational Therapy      Date: 2022  Patient Name: Apolinar Rhoades        MRN: [id number]  Account: [de-identified]   : 1960  (57 y.o.)  Room: [place] [address]    Chart reviewed, attempted OT at [id number]. Patient not seen 2° to:    Pt. off floor for test/procedure (Dialysis)    Will attempt again when able.     Electronically signed by MARY Oh on 2022 at 11:21 AM

## 2022-07-05 NOTE — PROGRESS NOTES
Subjective: The patient complains of severe acute on chronic progressive fatigue and lower extremity pain especially in her left knee due to neuropathy and osteoarthritis partially relieved by rest, PT, OT and meds Tylenol and exacerbated by exertion and recent illness-MRSA septicemia. I am concerned about patients medical complexities including:  Principal Problem:    MRSA bacteremia  Active Problems:    Impaired mobility and activities of daily living    Dialysis patient [facility])    Multiple closed fractures of right lower extremity and ribs    Closed T11 fracture ([facility] [address]    Encephalopathy acute    Sepsis due to Enterococcus ([facility] [address]    Local infection due to central venous catheter    DJD (degenerative joint disease), cervical    Closed head injury    Chronic diastolic congestive heart failure (HCC)    Closed rib fracture  Resolved Problems:    * No resolved hospital problems. *      .    Reviewed recent nursing note and discussed current status and planned care with acute care providers, \"PLAN Su[facility]. PLAN DISCUSSED WITH [facility] FOLLOW[facility] FOR MED. CLEARANCE. LSW/CM CONTINUE TO FOLLOW. \".  Also, \" Dialysis  Tomorrow  NOELLE-am  Afebrile \"     SP  dialysis   and then a NOELLE. Results are pending. Patient admits to severe left knee pain and tenderness. She would like to trial tramadol and possibly consider an x-ray. ROS x10: The patient also complains of severely impaired mobility and activities of daily living.   Otherwise no new problems with vision, hearing, nose, mouth, throat, dermal, cardiovascular, GI, , pulmonary, musculoskeletal, psychiatric or neurological.        Vital signs:  BP (!) 100/38   Pulse 81   Temp 97.9 °F (36.6 °C) (Oral)   Resp 16   Ht 5' 7\" (1.702 m)   Wt 191 lb (86.6 kg)   LMP  (LMP Unknown)   SpO2 90%   BMI 29.91 kg/m²   I/O:   PO/Intake:    fair PO intake, monitoring for dysphagia    Bowel/Bladder:   continent, General:  Patient is well developed, adequately nourished, and    well kempt. HEENT:    PERRLA, hearing intact to loud voice, external inspection of ear and nose benign. Inspection of lips, tongue and gums benign  Musculoskeletal: No significant change in strength or tone. All joints stable. Inspection and palpation of digits and nails show no clubbing, cyanosis or inflammatory conditions. Neuro/Psychiatric: Affect: flat-  Alert and oriented to self and situation with min cues. No significant change in deep tendon reflexes or sensation  Lungs:  Diminished, CTA-B  . Respiration effort is normal at rest.   Heart:   S1 = S2,   RRR. Abdomen:  Soft, non-tender    Extremities:  Trace  lower extremity edema but no unusual tenderness. Arthritic pain left knee-tender at the tibial plateau.   Skin:   BUE bruises dt blood draws-PVD discoloration bilateral lower extremities      Rehabilitation:  Physical Therapy:   Bed mobility:  Bed mobility  Supine to Sit: Contact guard assistance (07/02/22 1223)  Sit to Supine: Minimal assistance (07/02/22 1223)  Transfers:  Transfers  Sit to Stand: Contact guard assistance (07/02/22 1224)  Stand to sit: Contact guard assistance (07/02/22 1224)  Bed to Chair: Contact guard assistance (07/02/22 1224)  Gait:   Ambulation  Surface: level tile (07/02/22 1224)  Device: Rollator (07/02/22 1224)  Assistance: Contact guard assistance (07/02/22 1224)  Gait Deviations: Slow Tere (07/02/22 1224)  Distance: 2 ft limited by L knee pain (07/02/22 1224)  Stairs:  Stairs/Curb  Stairs?: No (07/02/22 1224)  W/C mobility:       Occupational Therapy:      ADL  Feeding: Setup (07/03/22 1211)  Grooming: Minimal assistance (07/03/22 1211)  UE Dressing: Minimal assistance (07/03/22 1211)  LE Dressing: Dependent/Total (07/03/22 1211)  LE Dressing Skilled Clinical Factors: pt unable to manage brief over hips- fear of falling in standing (07/03/22 1211)  Toileting: Dependent/Total (07/03/22  18.8 (L) >60    Calcium 9.5 8.5 - 9.9 mg/dL    Phosphorus 2.6 2.3 - 4.8 mg/dL    Albumin 3.5 3.5 - 4.6 g/dL   Magnesium    Collection Time: 07/06/22  5:57 AM   Result Value Ref Range    Magnesium 2.3 1.7 - 2.4 mg/dL   CBC    Collection Time: 07/06/22  5:58 AM   Result Value Ref Range    WBC 6.5 4.8 - 10.8 K/uL    RBC 3.24 (L) 4.20 - 5.40 M/uL    Hemoglobin 10.5 (L) 12.0 - 16.0 g/dL    Hematocrit 29.8 (L) 37.0 - 47.0 %    MCV 92.1 82.0 - 100.0 fL    MCH 32.3 (H) 27.0 - 31.3 pg    MCHC 35.1 33.0 - 37.0 %    RDW 14.6 (H) 11.5 - 14.5 %    Platelets 507 725 - 613 K/uL   POCT Glucose    Collection Time: 07/06/22  8:23 AM   Result Value Ref Range    POC Glucose 103 (H) 70 - 99 mg/dl    Performed on ACCU-CHEK      Previous extensive, complex labs, notes and diagnostics reviewed and analyzed. ALLERGIES:    Allergies as of 06/30/2022 - Fully Reviewed 06/30/2022   Allergen Reaction Noted    Codeine Hives 12/09/2011    Oxycontin [oxycodone hcl] Hives 06/15/2020      (please also verify by checking [facility])     Complex Physical Medicine & Rehab Issues Assess & Plan:   1. Severe abnormality of gait and mobility and impaired self-care and ADL's secondary to   MRSA septicemia. Updated functional and medical status reassessed regarding patients ability to participate in therapies and patient found to be able to participate in:        acute intensive comprehensive inpatient rehabilitation program including PT/OT to improve balance, ambulation, ADLs, and to improve the P/AROM. It is my opinion that they will be able to tolerate 3 hours of therapy a day and benefit from it at an acute level. I again discussed acute rehab with the patient and verify that the patient is able and willing to participate in 3 hours of therapy a day. Rehab and Acute Care Case Management has also reinforced this expectation.   Will continue to follow to attempt to get patient to the most efficient but most effective level of care will be in their best interest.  Continue to focus on energy conservation heart rate and blood pressure monitoring before during and after therapy endurance and consistency of function. 2. Bowel constipation   and Bladder dysfunction   overactive, neurogenic bladder:  frequent toileting, ambulate to bathroom with assistance, check post void residuals. Check for C.difficile x1 if >2 loose stools in 24 hours, continue bowel & bladder program.  Monitor for UTI symptoms including lethargy and confusion    3. Severe left knee pain secondary to osteoarthritis and generalized OA pain: reassess pain every shift and prior to and after each therapy session, give prn Tylenol  and consider scheduled Tylenol, modalities prn in therapy, consider Lidoderm, K-pad prn. Patient has rare use of  Ultram at home with Lyrica. Resume Ultram if okay with medical.  Consider left knee x-ray. 4. Skin breakdown   risk:  continue pressure relief program.  Daily skin exams and reports from nursing. 5. Severe fatigue due to immobility and nutritional deficits: monitoring for dysphagia   Add vitamin B12 vitamin D and CoQ10 titrate dosing and add protein supplementation with low carb content. 6. Complex discharge planning:   Discussed with care team-last 24 hour events noted. I will continue to follow along and reassess functional and medical status as we strive to improve patient's functional and medical outcomes progressing to the most efficient and lowest level of care. Complex Active General Medical Issues that complicate care:     1.  Principal Problem:    MRSA bacteremia  Active Problems:    Impaired mobility and activities of daily living    Dialysis patient [facility])    Multiple closed fractures of right lower extremity and ribs    Closed T11 fracture ([facility] [address]    Encephalopathy acute    Sepsis due to Enterococcus ([facility] [address]    Local infection due to central venous catheter    DJD (degenerative joint disease), cervical    Closed head injury    Chronic diastolic congestive heart failure (HCC)    Closed rib fracture  Resolved Problems:    * No resolved hospital problems. *          Events and functional changes in the past 24 hours reviewed improvements in functional status are encouraging       Focus of today's plan-patient will need to get a NOELEL to rule out endocarditis. In the meantime focus on pain control add massage Bengay and heat as well as Lidoderm Derm patches to her left knee.       Karie Peralta D.O., PM&R     Attending    [address]

## 2022-07-05 NOTE — PROGRESS NOTES
Infectious Disease     Patient Name: Gordon Hoffman  Date: 7/5/2022  YOB: 1960  Medical Record Number: [id number]        Sepsis with Enterococcus  Infected dialysis cath     mitral valve regurgitation  coronary artery bypass graft x1 vessel with tricuspid valve repair on 1/21/2022      Blood cultures from admission 2 sets growing gram-positive cocci in chains  Identified as Enterococcus faecalis by PCR  Patient currently on vancomycin    Patient has dialysis catheter in left chest which has been used while her dialysis fistula in right upper extremity has been repaired she states they are now using the fistula in right arm          Review of Systems   Constitutional: Negative. Negative for chills, diaphoresis, fatigue and fever. Respiratory: Negative for cough and shortness of breath. Cardiovascular: Negative. Gastrointestinal: Negative. Physical Exam  Constitutional:       Appearance: She is ill-appearing. Cardiovascular:      Heart sounds: Murmur heard. Pulmonary:      Effort: Pulmonary effort is normal. No respiratory distress. Breath sounds: Normal breath sounds. No wheezing, rhonchi or rales. Abdominal:      General: Abdomen is flat. Bowel sounds are normal. There is no distension. Palpations: Abdomen is soft. There is no mass. Tenderness: There is no abdominal tenderness. There is no guarding. Blood pressure 125/63, pulse 83, temperature 97.7 °F (36.5 °C), resp. rate 20, height 5' 7\" (1.702 m), weight 199 lb 15.3 oz (90.7 kg), SpO2 97 %, not currently breastfeeding.       .   Lab Results   Component Value Date    WBC 7.0 07/05/2022    HGB 9.7 (L) 07/05/2022    HCT 27.7 (L) 07/05/2022    MCV 91.7 07/05/2022     07/05/2022     Lab Results   Component Value Date/Time     07/05/2022 05:49 AM    K 4.6 07/05/2022 05:49 AM    K 4.3 07/04/2022 03:07 AM    CL 92 07/05/2022 05:49 AM    CO2 27 07/05/2022 05:49 AM    BUN 31 07/05/2022 05:49 AM    CREATININE 4.51 07/05/2022 05:49 AM    GLUCOSE 147 07/05/2022 05:49 AM    GLUCOSE 419 07/30/2021 08:16 AM    CALCIUM 9.5 07/05/2022 05:49 AM         7/1/22 1613   Culture Catheter Tip Culture, Catheter Tip:  NO GROWTH   Performed at [address] [place] [address]   [phone]        6/30/22 1212   Culture, Blood Id Sensitivity  Abnormal   Cult,Blood:  POSITIVE Blood Culture   Performed at [address] [place] [address]   [phone]   P      Organism Enterococcus faecalis Abnormal  P    Resulting Agency [address] [facility]          Susceptibility      Enterococcus faecalis (2)    Antibiotic Interpretation Microscan  Method Status    ampicillin Sensitive <=2 mcg/mL BACTERIAL SUSCEPTIBILITY PANEL BY DIYA     vancomycin Sensitive 1 mcg/mL BACTERIAL SUSCEPTIBILITY PANEL BY DIYA                   7/1/22 1613   Culture Catheter Tip Culture, Catheter Tip:  NO GROWTH          ASSESSMENT:  PLAN:    Sepsis with Enterococcus  Infected dialysis cath  Continue vancomycin    Repeat blood cultures no growth

## 2022-07-05 NOTE — PLAN OF CARE
Problem: Chronic Conditions and Co-morbidities  Goal: Patient's chronic conditions and co-morbidity symptoms are monitored and maintained or improved  Outcome: Progressing  Flowsheets (Taken 7/5/2022 1730)  Care Plan - Patient's Chronic Conditions and Co-Morbidity Symptoms are Monitored and Maintained or Improved:   Monitor and assess patient's chronic conditions and comorbid symptoms for stability, deterioration, or improvement   Collaborate with multidisciplinary team to address chronic and comorbid conditions and prevent exacerbation or deterioration   Update acute care plan with appropriate goals if chronic or comorbid symptoms are exacerbated and prevent overall improvement and discharge     Problem: Safety - Adult  Goal: Free from fall injury  Outcome: Progressing  Flowsheets (Taken 7/5/2022 1733)  Free From Fall Injury:   Instruct family/caregiver on patient safety   Based on caregiver fall risk screen, instruct family/caregiver to ask for assistance with transferring infant if caregiver noted to have fall risk factors     Problem: ABCDS Injury Assessment  Goal: Absence of physical injury  Outcome: Progressing  Flowsheets (Taken 7/5/2022 1733)  Absence of Physical Injury: Implement safety measures based on patient assessment     Problem: Skin/Tissue Integrity  Goal: Absence of new skin breakdown  Outcome: Progressing     Problem: Pain  Goal: Verbalizes/displays adequate comfort level or baseline comfort level  Outcome: Progressing     Problem: Nutrition Deficit:  Goal: Optimize nutritional status  7/5/2022 1734 by Juan Nieves RN  Outcome: Progressing  7/5/2022 1346 by Jacquelyn Carlos RD, LD  Outcome: Progressing

## 2022-07-05 NOTE — PROGRESS NOTES
Subjective: The patient complains of moderate acute on chronic progressive fatigue and weakness partially relieved by rest, PT, OT and meds   and exacerbated by exertion and recent illness. I am concerned about patients medical complexities including:  Principal Problem:    MRSA bacteremia  Active Problems:    Impaired mobility and activities of daily living    Closed T11 fracture (HCC)    Encephalopathy acute    Sepsis due to Enterococcus ([facility] [address]    Local infection due to central venous catheter    Chronic diastolic congestive heart failure (HCC)    Closed rib fracture    Dialysis patient [facility])    Multiple closed fractures of right lower extremity and ribs  Resolved Problems:    * No resolved hospital problems. *      .    Reviewed recent nursing note and discussed current status and planned care with acute care providers, \"  See notes  \". ROS x10: The patient also complains of severely impaired mobility and activities of daily living. Otherwise no new problems with vision, hearing, nose, mouth, throat, dermal, cardiovascular, GI, , pulmonary, musculoskeletal, psychiatric or neurological.        Vital signs:  /60   Pulse 74   Temp 98.1 °F (36.7 °C) (Oral)   Resp 18   Ht 5' 7\" (1.702 m)   Wt 196 lb (88.9 kg)   LMP  (LMP Unknown)   SpO2 94%   BMI 30.70 kg/m²   I/O:   PO/Intake:    fair PO intake, continue to monitor closely for dehydration    Bowel/Bladder:   continent,    General:  Patient is well developed, adequately nourished, and    well kempt. HEENT:    PERRLA, hearing intact to loud voice, external inspection of ear and nose benign. Inspection of lips, tongue and gums benign  Musculoskeletal: No significant change in strength or tone. All joints stable. Inspection and palpation of digits and nails show no clubbing, cyanosis or inflammatory conditions. Neuro/Psychiatric: Affect: flat-  Alert and oriented to self and situation with   cues.   No significant change in deep tendon reflexes or sensation  Lungs:  Diminished, CTA-B  . Respiration effort is normal at rest.   Heart:   S1 = S2,   RRR. Abdomen:  Soft, non-tender    Extremities:  Plus 1 lower extremity edema but no unusual tenderness. Skin:   BUE bruises dt blood draws  Lethargic having a difficult time paying attention    Rehabilitation:  Physical Therapy:   Bed mobility:  Bed mobility  Supine to Sit: Contact guard assistance (07/02/22 1223)  Sit to Supine: Minimal assistance (07/02/22 1223)  Transfers:  Transfers  Sit to Stand: Contact guard assistance (07/02/22 1224)  Stand to sit: Contact guard assistance (07/02/22 1224)  Bed to Chair: Contact guard assistance (07/02/22 1224)  Gait:   Ambulation  Surface: level tile (07/02/22 1224)  Device: Rollator (07/02/22 1224)  Assistance: Contact guard assistance (07/02/22 1224)  Gait Deviations: Slow Tere (07/02/22 1224)  Distance: 2 ft limited by L knee pain (07/02/22 1224)  Stairs:  Stairs/Curb  Stairs?: No (07/02/22 1224)  W/C mobility:       Occupational Therapy:      ADL  Feeding: Setup (07/03/22 1211)  Grooming: Minimal assistance (07/03/22 1211)  UE Dressing: Minimal assistance (07/03/22 1211)  LE Dressing: Dependent/Total (07/03/22 1211)  LE Dressing Skilled Clinical Factors: pt unable to manage brief over hips- fear of falling in standing (07/03/22 1211)  Toileting: Dependent/Total (07/03/22 1211)  Toileting Skilled Clinical Factors: reports significant difficulty with hygiene/clothing management (07/03/22 1211)  Additional Comments: Simulated ADLs. Pt complaining of fatigue with activity. Pt reports unable to assist with clothing over feet. Pt on purewick upon entering room but asked OT to remove it due to discomfort (07/03/22 1211)             Speech Therapy:            Diet/Swallow:        Dysphagia Outcome Severity Scale: Level 4: Mild moderate dysphagia- Intermittent supervision/cueing.  One - two diet consistencies restricted  Compensatory Swallowing Strategies : Eat/Feed slowly,Upright as possible for all oral intake,Small bites/sips  Therapeutic Interventions: Patient/Family education,Oral motor exercises,Diet tolerance monitoring    COGNITION  OT:    SP:           Lab/X-ray studies reviewed, analyzed and discussed with patient and staff:   Recent Results (from the past 24 hour(s))   POCT Glucose    Collection Time: 07/03/22  9:53 PM   Result Value Ref Range    POC Glucose 178 (H) 70 - 99 mg/dl    Performed on ACCU-CHEK    CBC with Auto Differential    Collection Time: 07/04/22  3:07 AM   Result Value Ref Range    WBC 5.8 4.8 - 10.8 K/uL    RBC 2.83 (L) 4.20 - 5.40 M/uL    Hemoglobin 9.1 (L) 12.0 - 16.0 g/dL    Hematocrit 26.3 (L) 37.0 - 47.0 %    MCV 93.0 82.0 - 100.0 fL    MCH 32.1 (H) 27.0 - 31.3 pg    MCHC 34.5 33.0 - 37.0 %    RDW 15.0 (H) 11.5 - 14.5 %    Platelets 243 (L) 871 - 400 K/uL    Neutrophils % 62.4 %    Lymphocytes % 21.6 %    Monocytes % 10.1 %    Eosinophils % 5.2 %    Basophils % 0.7 %    Neutrophils Absolute 3.6 1.4 - 6.5 K/uL    Lymphocytes Absolute 1.2 1.0 - 4.8 K/uL    Monocytes Absolute 0.6 0.2 - 0.8 K/uL    Eosinophils Absolute 0.3 0.0 - 0.7 K/uL    Basophils Absolute 0.0 0.0 - 0.2 K/uL   Comprehensive Metabolic Panel w/ Reflex to MG    Collection Time: 07/04/22  3:07 AM   Result Value Ref Range    Sodium 128 (L) 135 - 144 mEq/L    Potassium reflex Magnesium 4.3 3.4 - 4.9 mEq/L    Chloride 90 (L) 95 - 107 mEq/L    CO2 27 20 - 31 mEq/L    Anion Gap 11 9 - 15 mEq/L    Glucose 154 (H) 70 - 99 mg/dL    BUN 27 (H) 8 - 23 mg/dL    CREATININE 4.16 (H) 0.50 - 0.90 mg/dL    GFR Non-African American 10.8 (L) >60    GFR  13.0 (L) >60    Calcium 9.0 8.5 - 9.9 mg/dL    Total Protein 6.1 (L) 6.3 - 8.0 g/dL    Albumin 3.3 (L) 3.5 - 4.6 g/dL    Total Bilirubin 0.9 (H) 0.2 - 0.7 mg/dL    Alkaline Phosphatase 117 40 - 130 U/L    ALT 16 0 - 33 U/L    AST 21 0 - 35 U/L    Globulin 2.8 2.3 - 3.5 g/dL   POCT Glucose    Collection Time: 07/04/22  7:43 AM   Result Value Ref Range    POC Glucose 164 (H) 70 - 99 mg/dl    Performed on ACCU-CHEK    POCT Glucose    Collection Time: 07/04/22 11:53 AM   Result Value Ref Range    POC Glucose 218 (H) 70 - 99 mg/dl    Performed on ACCU-CHEK    POCT Glucose    Collection Time: 07/04/22  4:52 PM   Result Value Ref Range    POC Glucose 189 (H) 70 - 99 mg/dl    Performed on ACCU-CHEK      Previous extensive, complex labs, notes and diagnostics reviewed and analyzed. ALLERGIES:    Allergies as of 06/30/2022 - Fully Reviewed 06/30/2022   Allergen Reaction Noted    Codeine Hives 12/09/2011    Oxycontin [oxycodone hcl] Hives 06/15/2020      (please also verify by checking [facility])     Complex Physical Medicine & Rehab Issues Assess & Plan:   1. Severe abnormality of gait and mobility and impaired self-care and ADL's secondary to peripheral neuropathy and lumbar stenosis with  debility with suspected septicemia  . Updated functional and medical status reassessed regarding patients ability to participate in therapies and patient found to be able to participate in:          acute intensive comprehensive inpatient rehabilitation program including PT/OT to improve balance, ambulation, ADLs, and to improve the P/AROM. It is my opinion that they will be able to tolerate 3 hours of therapy a day and benefit from it at an acute level. I again discussed acute rehab with the patient and verify that the patient is able and willing to participate in 3 hours of therapy a day. Rehab and Acute Care Case Management has also reinforced this expectation. Will continue to follow to attempt to get patient to the most efficient but most effective level of care will be in their best interest.  Continue to focus on energy conservation heart rate and blood pressure monitoring before during and after therapy endurance and consistency of function.       2. Bowel constipation   and Bladder dysfunction   overactive, neurogenic bladder: frequent toileting, ambulate to bathroom with assistance, check post void residuals. Check for C.difficile x1 if >2 loose stools in 24 hours, continue bowel & bladder program.  Monitor for UTI symptoms including lethargy and confusion    3. Moderate   and generalized OA pain: reassess pain every shift and prior to and after each therapy session, give prn Tylenol   and consider scheduled Tylenol, modalities prn in therapy, consider Lidoderm, K-pad prn.     4. Skin breakdown   risk:  continue pressure relief program.  Daily skin exams and reports from nursing. 5. Severe fatigue due to immobility and nutritional deficits: continue to monitor closely for dehydration   Add vitamin B12 vitamin D and CoQ10 titrate dosing and add protein supplementation with low carb content. 6. Complex discharge planning:   Discussed with care team-last 24 hour events noted. I will continue to follow along and reassess functional and medical status as we strive to improve patient's functional and medical outcomes progressing to the most efficient and lowest level of care. Complex Active General Medical Issues that complicate care:     1. Principal Problem:    MRSA bacteremia  Active Problems:    Impaired mobility and activities of daily living    Closed T11 fracture (HCC)    Encephalopathy acute    Sepsis due to Enterococcus (HCC)    Local infection due to central venous catheter    Chronic diastolic congestive heart failure (HCC)    Closed rib fracture    Dialysis patient [facility])    Multiple closed fractures of right lower extremity and ribs  Resolved Problems:    * No resolved hospital problems.  *          Events and functional changes in the past 24 hours reviewed continue to monitor closely re: functional status and participation in therapy      Focus of today's plan-Enterococcus sepsis with peripheral neuropathy awaiting work-up for lumbar stenosis we will continue to monitor we will need OT evaluation in order to determine rehab needs  Roxy Frankel D.O., PM&R     Attending    [address]

## 2022-07-05 NOTE — PROGRESS NOTES
Arrived to pre/post from the cath lab and report from [facility].   Attached to monitor and patient resting comfortably

## 2022-07-05 NOTE — PROGRESS NOTES
Physical Therapy Missed Treatment   Facility/Department: [facility] MED SURG L711/T807-44    NAME: Jennifer Tolentino    : 1960 (41 y.o.)  MRN: [id number]    Account: [de-identified]  Gender: female    Chart reviewed, attempted PT at 0. Patient unavailable 2° to:    [] Hold per nsg request    [] Pt declined    [] Nsg notified   [] Other notified    [x] Pt. . off floor for test/procedure. dialysis     [] Pt. Unavailable       Will attempt PT treatment again at earliest convenience.       Electronically signed by Dirk Perera PTA on 22 at 9:23 AM EDT

## 2022-07-05 NOTE — PROGRESS NOTES
Physical Therapy Missed Treatment   Facility/Department: [facility] MED SURG X047/[id number]    NAME: Darry Frankel    : 1960 (10 y.o.)  MRN: [id number]    Account: [de-identified]  Gender: female    Chart reviewed, attempted PT at 200. Patient unavailable 2° to:    [] Hold per nsg request    [] Pt declined . [] Nsg notified   [] Other notified    [x] Pt. . off floor for test/procedure. Cath Lab    [] Pt. Unavailable       Will attempt PT treatment again at earliest convenience.       Electronically signed by Chet Keane PTA on 22 at 3:12 PM EDT

## 2022-07-05 NOTE — PROGRESS NOTES
Nephrology Progress Note  Vitals stable  Assessment:  ESRDX  MRSA  Mitral valve replacement mildly remote  Schizophrenia  DM type-2  Hypertension  Hx Seizures  OHDX       Plan: dialysis this morning than NOELLE later r/o veg.  Doubt no changes murmur  procalcitonnin better afebrile        Patient Active Problem List:     Atherosclerotic heart disease of native coronary artery with unspecified angina pectoris (HCC)     Schizophrenia, paranoid, chronic     Metabolic syndrome     Vitamin B 12 deficiency     Cerebral microvascular disease     Mixed hyperlipidemia     Other hammer toe (acquired)     Vitamin D insufficiency     Incontinence of urine     Diabetic nephropathy with proteinuria ([address]     Essential (primary) hypertension     History of type C viral hepatitis     Urinary incontinence due to cognitive impairment     History of seizures     Stented coronary artery-plan is to stay on Plavix indefinately per Dr Dinora Driver     Other specified diabetes mellitus with diabetic neuropathy, unspecified ([address]     Controlled type 2 diabetes mellitus with diabetic neuropathy, with long-term current use of insulin (HCC)     Hemiparesis, left (HCC)     Angina, class II ([address]     Pain, unspecified     Tardive dyskinesia     Shortness of breath     Chronic diastolic congestive heart failure (HCC)     Sleep apnea, unspecified     Pulmonary hypertension, unspecified (HCC)     Class 2 severe obesity with serious comorbidity and body mass index (BMI) of 36.0 to 36.9 in adult [facility])     Edema     Closed supracondylar fracture of right humerus     Other chronic pain     Palliative care patient     Recurrent falls     Renal failure     Difficulty in walking     ESRD (end stage renal disease) on dialysis ([address]     Weakness     Other seizures (HCC)     Moderate persistent asthma without complication     NOALJ-29     Post PTCA     Falls frequently     Chest wall contusion, left, initial encounter     Headache, unspecified     Paranoid schizophrenia ([facility]     Compression fracture of spine ([facility]     Closed rib fracture     Depression     Chronic obstructive pulmonary disease (HCC)     Critical illness polyneuropathy ([facility])     Multiple closed fractures of ribs of right side     Nonrheumatic mitral valve regurgitation     Nonrheumatic tricuspid valve regurgitation     Need for extended care facility     Chronic pain of both shoulders     Hemodialysis-associated hypotension     Anginal chest pain at rest [facility])     Chest pain     Unstable angina ([facility])     NSTEMI (non-ST elevated myocardial infarction) ([facility])     CKD (chronic kidney disease) stage 4, GFR 15-29 ml/min ([facility])     Hyperkalemia     Impaired mobility and activities of daily living     Dialysis patient [facility])     Unspecified open wound of right upper arm, initial encounter     Multiple closed fractures of right lower extremity and ribs     Closed T11 fracture ([facility])     Encephalopathy acute     MRSA bacteremia     Sepsis due to Enterococcus ([facility]     Local infection due to central venous catheter      Subjective:  Admit Date: 6/30/2022    Interval History:mno issues stable    Medications:  Scheduled Meds:   vancomycin  1,000 mg IntraVENous Once    lidocaine  3 patch TransDERmal Daily    polyethylene glycol  17 g Oral Daily    insulin lispro  0-12 Units SubCUTAneous TID WC    insulin lispro  0-6 Units SubCUTAneous Nightly    heparin (porcine)  2,000 Units IntraVENous Once    epoetin chadwick-epbx  10,000 Units SubCUTAneous Once    vancomycin (VANCOCIN) intermittent dosing (placeholder)   Other RX Placeholder    ARIPiprazole  5 mg Oral Daily    aspirin EC  81 mg Oral Daily    atorvastatin  40 mg Oral Nightly    insulin glargine  35 Units SubCUTAneous Nightly    isosorbide mononitrate  120 mg Oral Daily    magnesium oxide  400 mg Oral Daily    melatonin  10 mg Oral Nightly    midodrine  5 mg Oral Once per day on Mon Wed Fri    miconazole   Topical BID    sertraline  50 mg Oral Nightly Continuous Infusions:   dextrose         CBC:   Recent Labs     07/04/22  0307 07/05/22  0550   WBC 5.8 7.0   HGB 9.1* 9.7*   * 175     CMP:    Recent Labs     07/03/22  0324 07/04/22  0307 07/05/22  0549   * 128* 130*   K 4.0 4.3 4.6   CL 92* 90* 92*   CO2 30 27 27   BUN 15 27* 31*   CREATININE 3.00* 4.16* 4.51*   GLUCOSE 119* 154* 147*   CALCIUM 9.1 9.0 9.5   LABGLOM 15.7* 10.8* 9.8*     Troponin: No results for input(s): TROPONINI in the last 72 hours. BNP: No results for input(s): BNP in the last 72 hours. INR: No results for input(s): INR in the last 72 hours. Lipids: No results for input(s): CHOL, LDLDIRECT, TRIG, HDL, AMYLASE, LIPASE in the last 72 hours. Liver:   Recent Labs     07/04/22  0307 07/04/22  0307 07/05/22  0549   AST 21  --   --    ALT 16  --   --    ALKPHOS 117  --   --    PROT 6.1*  --   --    LABALBU 3.3*   < > 3.5   BILITOT 0.9*  --   --     < > = values in this interval not displayed. Iron:  No results for input(s): IRONS, FERRITIN in the last 72 hours. Invalid input(s): LABIRONS  Urinalysis: No results for input(s): UA in the last 72 hours.     Objective:  Vitals: BP (!) 150/61   Pulse 82   Temp 97.5 °F (36.4 °C) (Oral)   Resp 18   Ht 5' 7\" (1.702 m)   Wt 199 lb 15.3 oz (90.7 kg)   LMP  (LMP Unknown)   SpO2 97%   BMI 31.32 kg/m²    Wt Readings from Last 3 Encounters:   07/05/22 199 lb 15.3 oz (90.7 kg)   06/22/22 217 lb (98.4 kg)   06/06/22 215 lb 9.8 oz (97.8 kg)      24HR INTAKE/OUTPUT:      Intake/Output Summary (Last 24 hours) at 7/5/2022 0845  Last data filed at 7/4/2022 2019  Gross per 24 hour   Intake 840 ml   Output 1000 ml   Net -160 ml       General: alert, in no apparent distress  HEENT: normocephalic, atraumatic, anicteric  Neck: supple, no mass  Lungs: non-labored respirations, clear to auscultation bilaterally  Heart: regular rate and rhythm, no murmurs or rubs  Abdomen: soft, non-tender, non-distended  Ext: no cyanosis, no peripheral edema  Neuro: alert and oriented, no gross abnormalities  Psych: normal mood and affect  Skin: no rash      Electronically signed by Pepe Pearson DO, MD

## 2022-07-05 NOTE — BRIEF OP NOTE
Section of Cardiology  Adult Brief NOELLE Procedure Note        Procedure(s):  NOELLE    Pre-operative Diagnosis:  bacteremia    H&P Status: Completed and reviewed.      Post-operative Diagnosis:      No mass or thrombus  No shunts    Mild TR  Normal LVF      Findings:  See full report    Complications:  none    Primary Proceduralist:   Dr.Wes Howell DO      Full procedure note to follow

## 2022-07-05 NOTE — PROGRESS NOTES
Comprehensive Nutrition Assessment    Type and Reason for Visit:  Reassess    Nutrition Recommendations/Plan:   1. Continue minced and moist dysphagia diet  2. Trial diabetic supplement TID     Malnutrition Assessment:  Malnutrition Status:  Severe malnutrition (07/01/22 1105)    Context:  Social/Environmental Circumstances     Findings of the 6 clinical characteristics of malnutrition:  Energy Intake:  50% or less estimated energy requirements for 1 month or longer  Weight Loss:  Greater than 10% over 6 months     Body Fat Loss:  No significant body fat loss     Muscle Mass Loss:  No significant muscle mass loss    Fluid Accumulation:  Unable to assess     Strength:  Not Performed    Nutrition Assessment:    Pt continues to meet criteria for severe malnutrition evidenced by poor PO intake d/t dysphagia and significant wt loss. S/p MBS 7/1, with recommendations for minced and moist diet with thin liquids per SLP. Per I/O flowsheets, pt with varied intakes. Will add supplement. If intakes continue to improve, would benefit from carb controlled diet restriction. Nutrition Related Findings:    Pmhx: ESRD with HD T/Th/Sat, CHF, CAD, COPD, DM, GERD, toe amputation. Presented to ED s/p fall from standing dx rib fractures. Earlier in admission, reported poor intake pta d/t coughing with PO intake. Labs (7/5): Na low at 130, renal labs elevated, glucose elevated at 147-218 last 2 days. Last BM in I/O flowsheets shows 6/3/23. Suspect means 7/3. Trace generalized edema noted. Plans for NOELLE 7/5 to r/o endocarditis. Pt out of room x2 attempts of RD visit. Wound Type: None       Current Nutrition Intake & Therapies:    Average Meal Intake: 26-50%,51-75%,%  Average Supplements Intake: None Ordered  ADULT DIET;  Dysphagia - Minced and Moist  Diet NPO Exceptions are: Sips of Water with Meds    Anthropometric Measures:  Height: 5' 7\" (170.2 cm)  Ideal Body Weight (IBW): 135 lbs (61 kg)    Admission Body Weight: 217 lb (98.4 kg) (stated 6/30)  Current Body Weight: 199 lb (90.3 kg),   IBW. Weight Source: Bed Scale  Current BMI (kg/m2): 31.2  Usual Body Weight: 230 lb (104.3 kg) (bed 1/13/22; 218 lb 14 oz 6/2/22)  % Weight Change (Calculated): -13  Weight Adjustment For: No Adjustment                 BMI Categories: Obese Class 1 (BMI 30.0-34. 9)    Estimated Daily Nutrient Needs:  Energy Requirements Based On: Kcal/kg  Weight Used for Energy Requirements: Current  Energy (kcal/day): 1365-1550kcal (15-17kcal/kg)  Weight Used for Protein Requirements: Ideal  Protein (g/day): 73-79g (1.2-1.3g/kg)  Method Used for Fluid Requirements: Standard Renal  Fluid (ml/day):      Nutrition Diagnosis:   · Inadequate oral intake related to swallowing difficulty as evidenced by poor intake prior to admission    · Severe malnutrition,In context of social or environmental circumstances related to inadequate protein-energy intake as evidenced by poor intake prior to admission,NPO or clear liquid status due to medical condition,weight loss greater than or equal to 10% in 6 months      Nutrition Interventions:   Food and/or Nutrient Delivery: Continue Current Diet,Start Oral Nutrition Supplement  Nutrition Education/Counseling: No recommendation at this time  Coordination of Nutrition Care: Continue to monitor while inpatient       Goals:     Goals: Meet at least 75% of estimated needs,PO intake 50% or greater,by next RD assessment       Nutrition Monitoring and Evaluation:   Behavioral-Environmental Outcomes: None Identified  Food/Nutrient Intake Outcomes: Food and Nutrient Intake,Supplement Intake  Physical Signs/Symptoms Outcomes: Biochemical Data,Chewing or Swallowing,Meal Time Behavior,Weight    Discharge Planning:     Too soon to determine     Breanna Das, RD, LD

## 2022-07-05 NOTE — PROGRESS NOTES
Hospitalist Progress Note      PCP: Saint Sines, MD    Date of Admission: 6/30/2022    Chief Complaint:  No acute events, afebrile, stable HD    Medications:  Reviewed    Infusion Medications    dextrose       Scheduled Medications    vancomycin  1,000 mg IntraVENous Once    lidocaine  3 patch TransDERmal Daily    polyethylene glycol  17 g Oral Daily    insulin lispro  0-12 Units SubCUTAneous TID WC    insulin lispro  0-6 Units SubCUTAneous Nightly    heparin (porcine)  2,000 Units IntraVENous Once    epoetin chadwick-epbx  10,000 Units SubCUTAneous Once    vancomycin (VANCOCIN) intermittent dosing (placeholder)   Other RX Placeholder    ARIPiprazole  5 mg Oral Daily    aspirin EC  81 mg Oral Daily    atorvastatin  40 mg Oral Nightly    insulin glargine  35 Units SubCUTAneous Nightly    isosorbide mononitrate  120 mg Oral Daily    magnesium oxide  400 mg Oral Daily    melatonin  10 mg Oral Nightly    midodrine  5 mg Oral Once per day on Mon Wed Fri    miconazole   Topical BID    sertraline  50 mg Oral Nightly     PRN Meds: camphor-menthol-methyl salicylate, melatonin, acetaminophen **OR** acetaminophen, prochlorperazine, albuterol, hydrOXYzine HCl, glucose, dextrose bolus **OR** dextrose bolus, glucagon (rDNA), dextrose      Intake/Output Summary (Last 24 hours) at 7/5/2022 1145  Last data filed at 7/4/2022 2019  Gross per 24 hour   Intake 600 ml   Output 1000 ml   Net -400 ml       Exam:    /63   Pulse 83   Temp 97.7 °F (36.5 °C)   Resp 20   Ht 5' 7\" (1.702 m)   Wt 199 lb 15.3 oz (90.7 kg)   LMP  (LMP Unknown)   SpO2 97%   BMI 31.32 kg/m²     General appearance: appears stated age and cooperative. Respiratory:  clear to auscultation bilaterally  Cardiovascular: Regular rate and rhythm, S1/S2  Abdomen: Soft, active bowel sounds. Musculoskeletal: No edema bilaterally.       Labs:   Recent Labs     07/03/22  0324 07/04/22  0307 07/05/22  0550   WBC 6.0 5.8 7.0   HGB 9.2* 9. 1* 9.7*   HCT 26.4* 26.3* 27.7*   * 127* 175     Recent Labs     07/03/22  0324 07/04/22  0307 07/05/22  0549   * 128* 130*   K 4.0 4.3 4.6   CL 92* 90* 92*   CO2 30 27 27   BUN 15 27* 31*   CREATININE 3.00* 4.16* 4.51*   CALCIUM 9.1 9.0 9.5   PHOS  --   --  3.2     Recent Labs     07/03/22 0324 07/04/22  0307   AST 16 21   ALT 15 16   BILITOT 0.9* 0.9*   ALKPHOS 114 117     No results for input(s): INR in the last 72 hours. No results for input(s): Kaleen Hope in the last 72 hours. Urinalysis:      Lab Results   Component Value Date/Time    NITRU Negative 04/20/2022 10:45 AM    WBCUA >100 04/20/2022 10:45 AM    BACTERIA MANY 04/20/2022 10:45 AM    RBCUA 3-5 04/20/2022 10:45 AM    BLOODU TRACE 04/20/2022 10:45 AM    SPECGRAV 1.014 04/20/2022 10:45 AM    GLUCOSEU Negative 04/20/2022 10:45 AM       Radiology:  MRI LUMBAR SPINE WO CONTRAST   Final Result      Degenerative changes of the lumbar spine as detailed. Localizer images demonstrate cervical spine degenerative changes with apparent multilevel high-grade spinal canal stenosis. MRI BRAIN WO CONTRAST   Final Result      No acute ischemia or acute intracranial process. Generalized parenchymal volume loss and nonspecific white matter findings most compatible with chronic small vessel ischemic changes in a patient of this age. IR REMOVE TUNNELED CVAD WO SQ PORT/PUMP   Final Result   1. Successful removal of a tunneled internal jugular vein dialysis catheter. DIAGNOSIS: MRSA bacteremia       RADIATION DOSE: 0.25 mGy         FINDINGS:      Patient was placed supine on the procedure table. The chest and neck including the existing tunneled catheter were prepped and draped in sterile fashion. Lidocaine was used to anesthetize the skin tunnel site. Hemostats were used to dissect the Dacron    anchor cuff from the surrounding tissue.  Under gentle traction and while holding pressure at the neck venotomy site,

## 2022-07-05 NOTE — PROGRESS NOTES
Neurology Follow up    SUBJECTIVE: Patient seen and examined for neurology follow-up for bilateral lower extremity weakness and falls. Complains of back pain and weakness in the lower extremities. Patient has MRSA bacteremia. Afebrile.   Lamination symptoms unchanged MAR is reviewed      Current Facility-Administered Medications   Medication Dose Route Frequency Provider Last Rate Last Admin    lidocaine 4 % external patch 3 patch  3 patch TransDERmal Daily Jennifer Scullin, DO   2 patch at 07/04/22 1231    camphor-menthol-methyl salicylate (BENGAY ULTRA STRENGTH) 4-10-30 % cream   Apply externally TID PRN Jennifer Scullin, DO        polyethylene glycol (GLYCOLAX) packet 17 g  17 g Oral Daily Nicoletto Bolzan-Roche, APRN - CNP   17 g at 07/04/22 0823    insulin lispro (HUMALOG) injection vial 0-12 Units  0-12 Units SubCUTAneous TID WC Nicoletto Bolzan-Roche, APRN - CNP   2 Units at 07/04/22 1716    insulin lispro (HUMALOG) injection vial 0-6 Units  0-6 Units SubCUTAneous Nightly Nicoletto Bolzan-Roche, APRN - CNP   1 Units at 07/04/22 2022    heparin (porcine) injection 2,000 Units  2,000 Units IntraVENous Once Helder Hails, DO        epoetin chadwick-epbx (RETACRIT) injection 10,000 Units  10,000 Units SubCUTAneous Once Helder Hails, DO        vancomycin (VANCOCIN) intermittent dosing (placeholder)   Other RX Placeholder Nancie Crys, DO        melatonin tablet 3 mg  3 mg Oral Nightly PRN Nancie Crys, DO        acetaminophen (TYLENOL) tablet 650 mg  650 mg Oral Q6H PRN Nancie Crys, DO   650 mg at 07/04/22 1308    Or    acetaminophen (TYLENOL) suppository 650 mg  650 mg Rectal Q6H PRN Nancie Crys, DO   650 mg at 07/01/22 1354    prochlorperazine (COMPAZINE) injection 10 mg  10 mg IntraVENous TID PRN Nancie Crys, DO   10 mg at 07/05/22 7850    albuterol (PROVENTIL) nebulizer solution 2.5 mg  2.5 mg Nebulization Q4H PRN Nancie Crys, DO        ARIPiprazole (ABILIFY) tablet 5 mg  5 mg Oral Daily Roxy BECERRA Alma Horn, DO   5 mg at 07/04/22 4931    aspirin EC tablet 81 mg  81 mg Oral Daily Lurlene Masker, DO   81 mg at 07/04/22 4162    atorvastatin (LIPITOR) tablet 40 mg  40 mg Oral Nightly Lurlene Masker, DO   40 mg at 07/04/22 2014    hydrOXYzine HCl (ATARAX) tablet 25 mg  25 mg Oral TID PRN Lurlene Masker, DO        insulin glargine (LANTUS) injection vial 35 Units  35 Units SubCUTAneous Nightly Lurlene Masker, DO   35 Units at 07/04/22 2022    isosorbide mononitrate (IMDUR) extended release tablet 120 mg  120 mg Oral Daily Lurlene Masker, DO   120 mg at 07/04/22 6031    magnesium oxide (MAG-OX) tablet 400 mg  400 mg Oral Daily Lurlene Masker, DO   400 mg at 07/04/22 7902    melatonin disintegrating tablet 10 mg  10 mg Oral Nightly Lurlene Masker, DO   10 mg at 07/04/22 2014    midodrine (PROAMATINE) tablet 5 mg  5 mg Oral Once per day on Mon Wed Fri Roxy Hendrix, DO   5 mg at 07/04/22 5704    miconazole (MICOTIN) 2 % powder   Topical BID Lurlene Masker, DO   Given at 07/04/22 2016    sertraline (ZOLOFT) tablet 50 mg  50 mg Oral Nightly Lurlene Masker, DO   50 mg at 07/04/22 2014    glucose chewable tablet 16 g  4 tablet Oral PRN Lurlene Masker, DO        dextrose bolus 10% 125 mL  125 mL IntraVENous PRN Lurlene Masker, DO        Or    dextrose bolus 10% 250 mL  250 mL IntraVENous PRN Lurlene Masker, DO        glucagon (rDNA) injection 1 mg  1 mg IntraMUSCular PRN Lurlene Masker, DO        dextrose 5 % solution  100 mL/hr IntraVENous PRN Lurlene Masker, DO           PHYSICAL EXAM:    /66   Pulse 72   Temp 97.7 °F (36.5 °C)   Resp 20   Ht 5' 7\" (1.702 m)   Wt 199 lb 15.3 oz (90.7 kg)   LMP  (LMP Unknown)   SpO2 97%   BMI 31.32 kg/m²    General Appearance:      Skin:  normal  CVS - Normal sounds, No murmurs , No carotid Bruits  RS -CTA  Abdomen Soft, BS present  Review of Systems   Constitutional: Negative for fever. HENT: Negative for ear pain, tinnitus and trouble swallowing.     Eyes: Negative for photophobia and visual disturbance. Respiratory: Negative for choking and shortness of breath. Cardiovascular: Negative for chest pain and palpitations. Gastrointestinal: Negative for nausea and vomiting. Musculoskeletal: Positive for arthralgias, back pain, gait problem and myalgias. Negative for joint swelling, neck pain and neck stiffness. Skin: Negative for color change. Allergic/Immunologic: Negative for food allergies. Neurological: Positive for weakness. Negative for dizziness, tremors, seizures, syncope, facial asymmetry, speech difficulty, light-headedness, numbness and headaches. Psychiatric/Behavioral: Negative for behavioral problems, confusion, hallucinations and sleep disturbance.    She has no new symptoms or complaint m  Mental Status Exam:             Level of Alertness:   awake            Orientation:   person, place, time                    Attention/Concentration:  normal            Language:  normal      Funduscopic Exam:     Cranial Nerves            Cranial nerve III           Pupils:  equal, round, reactive to light      Cranial nerves III, IV, VI           Extraocular Movements: intact      Cranial nerve V           Facial sensation:  intact      Cranial nerve VII           Facial strength: intact      Cranial nerve VIII           Hearing:  intact      Cranial nerve IX           Palate:  intact      Cranial nerve XI         Shoulder shrug:  intact      Cranial nerve XII          Tongue movement:  normal    Motor: No extremity strength of 4/ 5 with areflexia            Sensory:        Pinprick             Right Upper Extremity:  normal             Left Upper Extremity:  normal             Right Lower Extremity:  normal             Left Lower Extremity:  normal           Vibration                         Touch            Proprioception                 Coordination:           Finger/Nose   Right:  normal              Left:  normal          Heel-Knee-Shin                Right:  normal Left:  normal          Rapid Alternating Movements              Right:  normal              Left:  normal          Gait:                       Casual: proximal muscle weakness is notable upon standing reflexes:             Deep Tendon Reflexes:             Reflexes are 2 +             Plantar response:                Right:  downgoing     Patient very much unchanged          left:  downgoing    Vascular:  Cardiac Exam:  normal         FL MODIFIED BARIUM SWALLOW W VIDEO    Result Date: 7/1/2022  FL MODIFIED BARIUM SWALLOW W VIDEO : 7/1/2022 CLINICAL HISTORY:  Choking with meal, concern for aspiration/dysphagia . COMPARISON: 5/10/2013. TECHNIQUE: Lateral videofluoroscopy was provided during speech therapy evaluation during ingestion of various barium liquids and semisolids. A total of 1.6 minutes of fluoroscopy time was used, with a total of  10 fluoroscopic series and one still saved. No diagnostic images were saved. Oral contrast:  50 mL BaSO4 FINDINGS: Mild to moderate oral dysphagia is observed with premature entry into the piriform sinuses and mild to moderate oral residual that cleared with independent re-swallowing. Endovaginal laryngeal penetration was observed with thin and soft barium consistencies that immediately cleared. Mild to moderate residual within the vallecula cleared with recent swallowing. No tracheal aspiration was identified. A full report will be made by the speech pathology team.     MILD TO MODERATELY ABNORMAL MODIFIED BARIUM SWALLOW. NO TRACHEAL ASPIRATION IDENTIFIED.  A FULL REPORT WILL BE MADE BY THE SPEECH PATHOLOGY TEAM.       Recent Labs     07/03/22 0324 07/04/22 0307 07/05/22  0550   WBC 6.0 5.8 7.0   HGB 9.2* 9.1* 9.7*   * 127* 175     Recent Labs     07/03/22 0324 07/04/22 0307 07/05/22  0549   * 128* 130*   K 4.0 4.3 4.6   CL 92* 90* 92*   CO2 30 27 27   BUN 15 27* 31*   CREATININE 3.00* 4.16* 4.51*   GLUCOSE 119* 154* 147*     Recent Labs     07/03/22 0324 07/04/22  0307   BILITOT 0.9* 0.9*   ALKPHOS 114 117   AST 16 21   ALT 15 16     Lab Results   Component Value Date/Time    PROTIME 16.3 06/30/2022 07:30 AM    INR 1.3 06/30/2022 07:30 AM     No results found for: LITHIUM, DILFRTOT, VALPROATE    ASSESSMENT AND PLAN  With ataxia with falls with lower extremity weakness. A lumbar canal stenosis must be ruled out. Recommend an MRI of the lumbosacral spine and the brain given that she has underlying bacteremia to make sure this is not endocarditis. She also has peripheral neuropathy to explain her lower extremity weakness as well had findings discussed. Exam as noted above. Patient has some ataxia and lower extremity weakness. We had recommended a lumbar spine evaluation with an MRI with an MRI I of the brain to make sure there is no septic emboli given underlying blood cultures. MRI is pending. Complain of knee pain which may be causing some degree with difficult ambulation. Exam remains unchanged with lower extremity weakness and back pain with areflexia. MRI was not done as there appeared to be some issues about stents and cardiology has been consulted for the same. Will await for the MRI prior to any other recommendations    7/5/22:   MRI of the brain negative for acute findings  MRI of the lumbar spine with degenerative changes and no significant canal stenosis. Localizer identified high-grade canal stenosis of the cervical spine. Will obtain dedicated cervical spine MRI. Patient with sepsis with Enterococcus bacteremia. Infected dialysis catheter. Continues on vancomycin. NOELLE today. I have personally performed a face to face diagnostic evaluation on this patient, reviewed all data and investigations, and am the sole provider of all clinical decisions on the neurological status of this patient. Patient was examined and unchanged examination. MRI was reviewed and is negative for any acute bleeds.   MRI of the lumbar spine did not show stenosis. On the localizer there was some question of cervical spinal stenosis and will obtain a dedicated MRI of the cervical spine. MRI reviewed in person and patient examined and within 60% time spent in evaluating and managing this patient myself. Darren Smith MD, [id number], American Board of Psychiatry & Neurology  Board Certified in Vascular Neurology  Board Certified in Neuromuscular Medicine  Certified in . [place]

## 2022-07-05 NOTE — PROGRESS NOTES
[facility]   Facility/Department: [facility] [address] [place]  Speech Language Pathology    Maritzanazario Vaughn  1960  [id number]    Date: 7/5/2022      Speech Therapy attempted to see Maritza Johana on this date for a/an:    Treatment    Pt was unable to be seen due to:   Patient is currently off unit--dialysis. Will re-attempt at the earliest opportunity.        Electronically signed by Vilinda Goltz, SLP on 7/5/22 at 9:46 AM EDT

## 2022-07-06 ENCOUNTER — APPOINTMENT (OUTPATIENT)
Dept: MRI IMAGING | Age: 62
DRG: 314 | End: 2022-07-06
Payer: MEDICARE

## 2022-07-06 ENCOUNTER — APPOINTMENT (OUTPATIENT)
Dept: GENERAL RADIOLOGY | Age: 62
DRG: 314 | End: 2022-07-06
Payer: MEDICARE

## 2022-07-06 LAB
ALBUMIN SERPL-MCNC: 3.5 G/DL (ref 3.5–4.6)
ANION GAP SERPL CALCULATED.3IONS-SCNC: 11 MEQ/L (ref 9–15)
BLOOD CULTURE, ROUTINE: NORMAL
BUN BLDV-MCNC: 15 MG/DL (ref 8–23)
CALCIUM SERPL-MCNC: 9.5 MG/DL (ref 8.5–9.9)
CHLORIDE BLD-SCNC: 99 MEQ/L (ref 95–107)
CO2: 29 MEQ/L (ref 20–31)
CREAT SERPL-MCNC: 3.03 MG/DL (ref 0.5–0.9)
CULTURE, BLOOD 2: NORMAL
GFR AFRICAN AMERICAN: 18.8
GFR NON-AFRICAN AMERICAN: 15.5
GLUCOSE BLD-MCNC: 103 MG/DL (ref 70–99)
GLUCOSE BLD-MCNC: 122 MG/DL (ref 70–99)
GLUCOSE BLD-MCNC: 161 MG/DL (ref 70–99)
GLUCOSE BLD-MCNC: 161 MG/DL (ref 70–99)
GLUCOSE BLD-MCNC: 86 MG/DL (ref 70–99)
HCT VFR BLD CALC: 29.8 % (ref 37–47)
HEMOGLOBIN: 10.5 G/DL (ref 12–16)
MAGNESIUM: 2.3 MG/DL (ref 1.7–2.4)
MCH RBC QN AUTO: 32.3 PG (ref 27–31.3)
MCHC RBC AUTO-ENTMCNC: 35.1 % (ref 33–37)
MCV RBC AUTO: 92.1 FL (ref 82–100)
PDW BLD-RTO: 14.6 % (ref 11.5–14.5)
PERFORMED ON: ABNORMAL
PHOSPHORUS: 2.6 MG/DL (ref 2.3–4.8)
PLATELET # BLD: 196 K/UL (ref 130–400)
POTASSIUM SERPL-SCNC: 4.3 MEQ/L (ref 3.4–4.9)
RBC # BLD: 3.24 M/UL (ref 4.2–5.4)
SODIUM BLD-SCNC: 139 MEQ/L (ref 135–144)
WBC # BLD: 6.5 K/UL (ref 4.8–10.8)

## 2022-07-06 PROCEDURE — 72141 MRI NECK SPINE W/O DYE: CPT

## 2022-07-06 PROCEDURE — 6370000000 HC RX 637 (ALT 250 FOR IP): Performed by: PHYSICAL MEDICINE & REHABILITATION

## 2022-07-06 PROCEDURE — 2700000000 HC OXYGEN THERAPY PER DAY

## 2022-07-06 PROCEDURE — 6370000000 HC RX 637 (ALT 250 FOR IP): Performed by: NURSE PRACTITIONER

## 2022-07-06 PROCEDURE — 83735 ASSAY OF MAGNESIUM: CPT

## 2022-07-06 PROCEDURE — 99233 SBSQ HOSP IP/OBS HIGH 50: CPT | Performed by: PSYCHIATRY & NEUROLOGY

## 2022-07-06 PROCEDURE — 97535 SELF CARE MNGMENT TRAINING: CPT

## 2022-07-06 PROCEDURE — 36415 COLL VENOUS BLD VENIPUNCTURE: CPT

## 2022-07-06 PROCEDURE — 1210000000 HC MED SURG R&B

## 2022-07-06 PROCEDURE — 80069 RENAL FUNCTION PANEL: CPT

## 2022-07-06 PROCEDURE — 99232 SBSQ HOSP IP/OBS MODERATE 35: CPT | Performed by: PHYSICAL MEDICINE & REHABILITATION

## 2022-07-06 PROCEDURE — 73562 X-RAY EXAM OF KNEE 3: CPT

## 2022-07-06 PROCEDURE — 85027 COMPLETE CBC AUTOMATED: CPT

## 2022-07-06 PROCEDURE — 99232 SBSQ HOSP IP/OBS MODERATE 35: CPT | Performed by: INTERNAL MEDICINE

## 2022-07-06 PROCEDURE — 6370000000 HC RX 637 (ALT 250 FOR IP): Performed by: INTERNAL MEDICINE

## 2022-07-06 PROCEDURE — APPSS15 APP SPLIT SHARED TIME 0-15 MINUTES: Performed by: NURSE PRACTITIONER

## 2022-07-06 PROCEDURE — 6360000002 HC RX W HCPCS: Performed by: INTERNAL MEDICINE

## 2022-07-06 RX ADMIN — MICONAZOLE NITRATE: 2 POWDER TOPICAL at 21:03

## 2022-07-06 RX ADMIN — Medication 400 MG: at 08:37

## 2022-07-06 RX ADMIN — ACETAMINOPHEN 325MG 650 MG: 325 TABLET ORAL at 08:37

## 2022-07-06 RX ADMIN — Medication 10 MG: at 20:57

## 2022-07-06 RX ADMIN — ACETAMINOPHEN 325MG 650 MG: 325 TABLET ORAL at 20:57

## 2022-07-06 RX ADMIN — MIDODRINE HYDROCHLORIDE 5 MG: 5 TABLET ORAL at 08:38

## 2022-07-06 RX ADMIN — PROCHLORPERAZINE EDISYLATE 10 MG: 5 INJECTION INTRAMUSCULAR; INTRAVENOUS at 21:17

## 2022-07-06 RX ADMIN — ISOSORBIDE MONONITRATE 120 MG: 60 TABLET, EXTENDED RELEASE ORAL at 08:38

## 2022-07-06 RX ADMIN — ATORVASTATIN CALCIUM 40 MG: 40 TABLET, FILM COATED ORAL at 20:58

## 2022-07-06 RX ADMIN — ASPIRIN 81 MG: 81 TABLET, COATED ORAL at 08:37

## 2022-07-06 RX ADMIN — ACETAMINOPHEN 325MG 650 MG: 325 TABLET ORAL at 06:20

## 2022-07-06 RX ADMIN — ARIPIPRAZOLE 5 MG: 5 TABLET ORAL at 08:38

## 2022-07-06 RX ADMIN — Medication: at 21:00

## 2022-07-06 RX ADMIN — INSULIN GLARGINE 35 UNITS: 100 INJECTION, SOLUTION SUBCUTANEOUS at 20:55

## 2022-07-06 RX ADMIN — INSULIN LISPRO 1 UNITS: 100 INJECTION, SOLUTION INTRAVENOUS; SUBCUTANEOUS at 20:55

## 2022-07-06 RX ADMIN — SERTRALINE HYDROCHLORIDE 50 MG: 50 TABLET ORAL at 20:58

## 2022-07-06 ASSESSMENT — ENCOUNTER SYMPTOMS
COUGH: 0
PHOTOPHOBIA: 0
TROUBLE SWALLOWING: 0
COLOR CHANGE: 0
GASTROINTESTINAL NEGATIVE: 1
CHOKING: 0
VOMITING: 0
SHORTNESS OF BREATH: 0
NAUSEA: 0
BACK PAIN: 1

## 2022-07-06 ASSESSMENT — PAIN DESCRIPTION - ORIENTATION
ORIENTATION: LEFT
ORIENTATION: RIGHT;LEFT;MID

## 2022-07-06 ASSESSMENT — PAIN SCALES - GENERAL
PAINLEVEL_OUTOF10: 3
PAINLEVEL_OUTOF10: 7

## 2022-07-06 ASSESSMENT — PAIN DESCRIPTION - LOCATION
LOCATION: BACK;KNEE
LOCATION: BACK;KNEE

## 2022-07-06 ASSESSMENT — PAIN DESCRIPTION - DESCRIPTORS: DESCRIPTORS: ACHING

## 2022-07-06 NOTE — PROGRESS NOTES
(chronic kidney disease) stage 4, GFR 15-29 ml/min ([facility]) 2/24/2018    CKD stage 4 due to type 2 diabetes mellitus ([address]      Contusion of right chest wall 2/16/2021    COPD (chronic obstructive pulmonary disease) ([facility])      Diabetic nephropathy with proteinuria ([address] 2014    DJD (degenerative joint disease) of knee       Dr Tegan Bliss GERD (gastroesophageal reflux disease)      Hemiparesis, left ([address] 2013     entered Assisted Living ([facility])    Hemodialysis patient [facility])      Hemodialysis-associated hypotension 10/22/2021    History of heart failure      History of seizures      History of type C viral hepatitis      HTN (hypertension)      Hyperlipidemia      Impaired mobility and activities of daily living      Mediastinal lymphadenopathy 2013     Myrna Suarez    Metabolic syndrome      Moderate persistent asthma without complication 3/95/7894    Need for extended care facility 7/7/2021    Neurogenic urinary incontinence 2013    Neuropathy in diabetes [facility])      Nonrheumatic mitral valve regurgitation 7/7/2021    Nonrheumatic tricuspid valve regurgitation 7/7/2021    Obesity (BMI 30-39. 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 ([address]      Type 2 diabetes mellitus with renal manifestations, controlled ([address] 2015     Insulin dependent, Dr Chelly Vizcarra    Uncontrolled type 2 diabetes mellitus with hyperglycemia ([address]      Urinary incontinence due to cognitive impairment 2013    Vitamin D deficiency 2014             Patient Active Problem List   Diagnosis    Atherosclerotic heart disease of native coronary artery with unspecified angina pectoris ([address]    Schizophrenia, paranoid, chronic    Metabolic syndrome    Vitamin B 12 deficiency    Cerebral microvascular disease    Mixed hyperlipidemia    Other hammer toe (acquired)    Vitamin D insufficiency    Incontinence of urine    Diabetic nephropathy with proteinuria (HCC)    Essential (primary) hypertension    History of type C viral hepatitis    Urinary incontinence due to cognitive impairment    History of seizures    Stented coronary artery-plan is to stay on Plavix indefinately per Dr Marshal Babinski Other specified diabetes mellitus with diabetic neuropathy, unspecified ([address]    Controlled type 2 diabetes mellitus with diabetic neuropathy, with long-term current use of insulin ([facility])    Hemiparesis, left ([facility])    Angina, class II ([address]    Pain, unspecified    Tardive dyskinesia    Shortness of breath    Chronic diastolic congestive heart failure ([facility])    Sleep apnea, unspecified    Pulmonary hypertension, unspecified ([facility])    Class 2 severe obesity with serious comorbidity and body mass index (BMI) of 36.0 to 36.9 in adult ([facility])    Edema    Closed supracondylar fracture of right humerus    Other chronic pain    Palliative care patient    Recurrent falls    Renal failure    Difficulty in walking    ESRD (end stage renal disease) on dialysis ([facility])    Weakness    Other seizures ([facility])    Moderate persistent asthma without complication    [id number]    Post PTCA    Falls frequently    Chest wall contusion, left, initial encounter    Headache, unspecified    Paranoid schizophrenia ([address]    Compression fracture of spine ([facility])    Closed rib fracture    Depression    Chronic obstructive pulmonary disease ([facility])    Critical illness polyneuropathy ([address]    Multiple closed fractures of ribs of right side    Nonrheumatic mitral valve regurgitation    Nonrheumatic tricuspid valve regurgitation    Need for extended care facility    Chronic pain of both shoulders    Hemodialysis-associated hypotension    Anginal chest pain at rest [facility])    Chest pain    Unstable angina ([facility])    NSTEMI (non-ST elevated myocardial infarction) ([facility])    CKD (chronic kidney disease) stage 4, GFR 15-29 ml/min ([facility])    Hyperkalemia    Impaired mobility and activities of daily living    Dialysis patient [facility])    Unspecified open wound of right upper arm, initial encounter    Multiple closed fractures of right lower extremity and ribs    Closed T11 fracture ([facility])    Encephalopathy acute    MRSA bacteremia    Sepsis due to Enterococcus [facility]    Local infection due to central venous catheter         Past Surgical History         Past Surgical History:   Procedure Laterality Date     SECTION         x1    COLONOSCOPY   2014     Dr. Tyrell Rosado x1 Dr. Sarai Aranda, Dr Hill Shallow 2018    Anayeli Domingo   08/10/2021     [facility]    DIAGNOSTIC CARDIAC CATH LAB PROCEDURE   10/02/2019    DIALYSIS CATHETER INSERTION Left 2022     Tunneled Symetrex 15.5F x 23cm hemodialysis catheter inserted by Dr. Urvashi Schroeder, TOTAL ABDOMINAL (CERVIX REMOVED)         one ovary intact, Dr Cayla Hayes, menorrhagia    IR THROMBECTOMY PERCUT AVF   2022     IR THROMBECTOMY PERCUT AVF 2022 [facility] SPECIAL PROCEDURE    IR TUNNELED CATHETER PLACEMENT GREATER THAN 5 YEARS   6/3/2022     IR TUNNELED CATHETER PLACEMENT GREATER THAN 5 YEARS 6/3/2022 [facility] SPECIAL PROCEDURE    MD TOTAL KNEE ARTHROPLASTY Left 2018     LEFT KNEE TOTAL KNEE ARTHROPLASTY, SHAYNA, NERVE BLOCK performed by Que Sebastian MD at [address] [place] [address] PTCA        TOE AMPUTATION Right      TOTAL KNEE ARTHROPLASTY   2016     Dr Kayley Mason TUNNELED [address] Right 2020     tunneled HD catheter per Dr Yamilka Bee History   Social History            Socioeconomic History    Marital status:        Spouse name: None    Number of children: 2    Years of education: None    Highest education level: None   Occupational History    Occupation: disabled   Tobacco Use    Smoking status: Never Smoker    Smokeless tobacco: Never Used   Vaping Use    Vaping Use: Never used   Substance and Sexual Activity    Alcohol use: No       Alcohol/week: 0.0 standard drinks    Drug use: No    Sexual activity: Not Currently   Other Topics Concern    None   Social History Narrative     Disabled, [place] [facility] [place]      Born in [place], one of 5     Twin sister Reyes, very ill in 2018, [place] 2019     Moved to [facility] July, , 2 children, one son and one daughter     Worked at [employer], as a nurse's aide     Disabled due to mental illness     Lived at [employer], was discharged, returned to independent living in 2017 in the daughter's house and has adjusted well     One son and one daughter, live in the same house with patient, Amelia Jordan pays the rent     [employer] reading ([employer])                 10/11/2021 [facility] updates; patient lives with her daughter son-in-law and 2 grandchildren and patient's handicapped son. Per daughter, her brother is blind, MRDD, multiple health issues. Daughter's  is patient's legal guardian.     Patient has hemodialysis Tuesday Thursday and Saturday. Patient's bedroom is on main floor with a half bath. Daughter walks patient upstairs once weekly for full bath. Patient is using her walker in the home. Patient has a hospital bed in the home.      Social Determinants of Health          Financial Resource Strain: Low Risk     Difficulty of Paying Living Expenses: Not hard at all   Food Insecurity: No Food Insecurity    Worried About Running Out of Food in the Last Year: Never true    Yung of Food in the Last Year: Never true   Transportation Needs: No Transportation Needs    Lack of Transportation (Medical):  No    Lack of Transportation (Non-Medical):  No   Physical Activity: Sufficiently Active    Days of Exercise per Week: 3 days    Minutes of Exercise per Session: 60 min   Stress:     Feeling of Stress : Not on file   Social Connections:     Frequency of Communication with Friends and Family: Not on file    Frequency of Social Gatherings with Friends and Family: Not on file    Attends Yarsani Services: Not on file    Active Member of Clubs or Organizations: Not on file    Attends Club or Organization Meetings: Not on file    Marital Status: Not on file   Intimate Partner Violence:     Fear of Current or Ex-Partner: Not on file    Emotionally Abused: Not on file    Physically Abused: Not on file    Sexually Abused: Not on file   Housing Stability:     Unable to Pay for Housing in the Last Year: Not on file    Number of [place] in the Last Year: Not on file    Unstable Housing in the Last Year: Not on file            Family History         Family History   Problem Relation Age of Onset    Cancer Mother 76         survived    Breast Cancer Mother      Hypertension Father      Diabetes Sister      Mental Illness Sister      Colon Cancer Neg Hx              Current Facility-Administered Medications             Current Facility-Administered Medications   Medication Dose Route Frequency Provider Last Rate Last Admin    insulin lispro (HUMALOG) injection vial 0-12 Units  0-12 Units SubCUTAneous TID WC Nicoletto Bolzan-Roche, APRN - CNP   2 Units at 07/02/22 0907    insulin lispro (HUMALOG) injection vial 0-6 Units  0-6 Units SubCUTAneous Nightly Nicoletto Bolzan-Roche, APRN - CNP        lidocaine 4 % external patch 1 patch  1 patch TransDERmal Daily Maricruz Wallis DO   1 patch at 07/02/22 1117    LORazepam (ATIVAN) injection 1 mg  1 mg IntraVENous Once PRN Felipe Mcdonald MD        heparin (porcine) injection 2,000 Units  2,000 Units IntraVENous Once Rl Stewart DO        epoetin chadwick-epbx (RETACRIT) injection 10,000 Units  10,000 Units SubCUTAneous Once Valeriy Dowd DO        vancomycin [facility]) intermittent dosing (placeholder)   Other RX Placeholder Isidore Osier, DO        melatonin tablet 3 mg  3 mg Oral Nightly PRN Isidore Osier, DO        acetaminophen (TYLENOL) tablet 650 mg  650 mg Oral Q6H PRN Isidore Osier, DO   650 mg at 07/02/22 2631     Or    acetaminophen (TYLENOL) suppository 650 mg  650 mg Rectal Q6H PRN Isidore Osier, DO   650 mg at 07/01/22 1354    prochlorperazine (COMPAZINE) injection 10 mg  10 mg IntraVENous TID PRN Isidore Osier, DO   10 mg at 06/30/22 1507    albuterol (PROVENTIL) nebulizer solution 2.5 mg  2.5 mg Nebulization Q4H PRN Isidore Osier, DO        ARIPiprazole (ABILIFY) tablet 5 mg  5 mg Oral Daily Isidore Osier, DO   5 mg at 07/02/22 0906    aspirin EC tablet 81 mg  81 mg Oral Daily Isidore Osier, DO   81 mg at 07/02/22 9035    atorvastatin (LIPITOR) tablet 40 mg  40 mg Oral Nightly Isidore Osier, DO   40 mg at 07/01/22 2102    hydrOXYzine HCl (ATARAX) tablet 25 mg  25 mg Oral TID PRN Isidore Osier, DO        insulin glargine (LANTUS) injection vial 35 Units  35 Units SubCUTAneous Nightly Isidore Osier, DO   35 Units at 07/01/22 2103    isosorbide mononitrate (IMDUR) extended release tablet 120 mg  120 mg Oral Daily Isidore Osier, DO   120 mg at 07/02/22 9956    magnesium oxide (MAG-OX) tablet 400 mg  400 mg Oral Daily Isidore Osier, DO   400 mg at 07/02/22 1357    melatonin disintegrating tablet 10 mg  10 mg Oral Nightly Isidore Osier, DO   10 mg at 07/01/22 2102    midodrine (PROAMATINE) tablet 5 mg  5 mg Oral Once per day on Mon Wed Fri Roxy Hendrix,         miconazole (MICOTIN) 2 % powder   Topical BID Isidore Osier, DO   Given at 07/02/22 2960    sertraline (ZOLOFT) tablet 50 mg  50 mg Oral Nightly Isidore Osier, DO   50 mg at 07/01/22 2102    glucose chewable tablet 16 g  4 tablet Oral PRN Isidore Osier, DO        dextrose bolus 10% 125 mL 125 mL IntraVENous PRN Maryfrances Nilsa, DO         Or    dextrose bolus 10% 250 mL  250 mL IntraVENous PRN Maryfrances Nilsa, DO        glucagon (rDNA) injection 1 mg  1 mg IntraMUSCular PRN Maryfrances Nilsa, DO        dextrose 5 % solution  100 mL/hr IntraVENous PRN Maryfrances Nilsa, DO                ALLERGIES: Codeine and Oxycontin [oxycodone hcl]     Review of Systems   Constitutional: Negative. Negative for chills and fever. HENT: Negative. Eyes: Negative. Respiratory: Negative for shortness of breath and wheezing. Cardiovascular: Negative for chest pain, palpitations and leg swelling. Gastrointestinal: Negative. Negative for abdominal pain, nausea and vomiting. Endocrine: Negative. Genitourinary: Negative. Musculoskeletal: Negative. Skin: Negative. Negative for rash. Allergic/Immunologic: Negative. Neurological: Negative for dizziness, weakness and headaches. Hematological: Negative. Psychiatric/Behavioral: Negative.             VITALS:  Blood pressure (!) 106/58, pulse 69, temperature 97.7 °F (36.5 °C), temperature source Oral, resp. rate 16, height 5' 7\" (1.702 m), weight 200 lb 9.6 oz (91 kg), SpO2 100 %, not currently breastfeeding. Body mass index is 31.42 kg/m².     Physical Exam  Constitutional:       Appearance: She is well-developed. She is not diaphoretic. HENT:      Head: Normocephalic and atraumatic. Eyes:      Pupils: Pupils are equal, round, and reactive to light. Neck:      Thyroid: No thyromegaly. Vascular: No JVD. Trachea: No tracheal deviation. Cardiovascular:      Rate and Rhythm: Normal rate and regular rhythm. Chest Wall: PMI is not displaced. Pulses: Intact distal pulses. Heart sounds: Normal heart sounds. Heart sounds not distant. No murmur heard. No friction rub. No gallop. No S3 sounds. Pulmonary:      Effort: No respiratory distress. Breath sounds: No wheezing or rales. Chest:      Chest wall: No tenderness. Abdominal:      General: Bowel sounds are normal. There is no distension. Palpations: Abdomen is soft. There is no mass. Tenderness: There is no abdominal tenderness. There is no guarding or rebound. Musculoskeletal:      Cervical back: Normal range of motion and neck supple. Skin:     General: Skin is warm and dry. Coloration: Skin is not pale. Findings: No erythema or rash. Neurological:      Mental Status: She is alert and oriented to person, place, and time. Cranial Nerves: No cranial nerve deficit. Psychiatric:         Behavior: Behavior normal.         Thought Content:  Thought content normal.         Judgment: Judgment normal.            LABS:  Recent Results         Recent Results (from the past 24 hour(s))   Sedimentation Rate     Collection Time: 07/01/22  2:51 PM   Result Value Ref Range     Sed Rate 48 (H) 0 - 30 mm   POCT Glucose     Collection Time: 07/01/22  8:43 PM   Result Value Ref Range     POC Glucose 233 (H) 70 - 99 mg/dl     Performed on ACCU-CHEK     CBC with Auto Differential     Collection Time: 07/02/22  5:47 AM   Result Value Ref Range     WBC 6.6 4.8 - 10.8 K/uL     RBC 3.06 (L) 4.20 - 5.40 M/uL     Hemoglobin 9.9 (L) 12.0 - 16.0 g/dL     Hematocrit 28.9 (L) 37.0 - 47.0 %     MCV 94.3 82.0 - 100.0 fL     MCH 32.5 (H) 27.0 - 31.3 pg     MCHC 34.4 33.0 - 37.0 %     RDW 15.3 (H) 11.5 - 14.5 %     Platelets 94 (L) 819 - 400 K/uL     Neutrophils % 70.1 %     Lymphocytes % 15.1 %     Monocytes % 10.8 %     Eosinophils % 3.4 %     Basophils % 0.6 %     Neutrophils Absolute 4.6 1.4 - 6.5 K/uL     Lymphocytes Absolute 1.0 1.0 - 4.8 K/uL     Monocytes Absolute 0.7 0.2 - 0.8 K/uL     Eosinophils Absolute 0.2 0.0 - 0.7 K/uL     Basophils Absolute 0.0 0.0 - 0.2 K/uL   Comprehensive Metabolic Panel w/ Reflex to MG     Collection Time: 07/02/22  5:48 AM   Result Value Ref Range     Sodium 135 135 - 144 mEq/L     Potassium reflex Magnesium 4.3 3.4 - 4.9 mEq/L     Chloride 93 (L) 95 - 107 mEq/L     CO2 30 20 - 31 mEq/L     Anion Gap 12 9 - 15 mEq/L     Glucose 140 (H) 70 - 99 mg/dL     BUN 25 (H) 8 - 23 mg/dL     CREATININE 4.50 (H) 0.50 - 0.90 mg/dL     GFR Non- 9.8 (L) >60     GFR  11.9 (L) >60     Calcium 9.3 8.5 - 9.9 mg/dL     Total Protein 6.4 6.3 - 8.0 g/dL     Albumin 3.5 3.5 - 4.6 g/dL     Total Bilirubin 1.3 (H) 0.2 - 0.7 mg/dL     Alkaline Phosphatase 124 40 - 130 U/L     ALT 19 0 - 33 U/L     AST 20 0 - 35 U/L     Globulin 2.9 2.3 - 3.5 g/dL   Vancomycin Level, Random     Collection Time: 07/02/22  5:48 AM   Result Value Ref Range     Vancomycin Rm 14.9 10.0 - 40.0 ug/mL   POCT Glucose     Collection Time: 07/02/22  7:48 AM   Result Value Ref Range     POC Glucose 152 (H) 70 - 99 mg/dl     Performed on ACCU-CHEK           Troponin:         Lab Results   Component Value Date/Time     TROPONINI 0.037 06/30/2022 07:30 AM               ASSESSMENT:     Bacteremia-rule out endocarditis  History of CAD status post multivessel PCI/CABG  History of tricuspid valve repair  History of postsurgical tamponade status post pericardial window  Diabetes mellitus  End-stage renal disease hemodialysis dependent  Essential hypertension  Hyperlipidemia  Elevated troponin-demand ischemia  Anemia  Multiple medical problem     PLAN:   1. As always, aggressive risk factor modification is strongly recommended. We should adhere to the JNC VIII guidelines for HTN management and the NCEPATP III guidelines for LDL-C management. 2. Maximize cardiac medications  3. Nephrology recommendations  4. Monitor on telemetry  5. Maintain test between 3.5-4.5 and magnesium greater than 2  6. Monitor H&H closely  7. GI/DVT prophylaxis  8. Stable for discharge/answer to rehab from cardiology standpoint        Thank you for allowing me to participate in the care of your patient, please don't hesitate to contact me if you have any further questions.       Attending Supervising Kong Christine ASSESSMENT:     Bacteremia- s/p negative NOELLE. no evidence of endocarditis. History of CAD status post multivessel PCI/CABG  History of tricuspid valve repair  History of postsurgical tamponade status post pericardial window  Diabetes mellitus  End-stage renal disease hemodialysis dependent  Essential hypertension  Hyperlipidemia  Elevated troponin-demand ischemia  Anemia  Multiple medical problem     PLAN:   1. As always, aggressive risk factor modification is strongly recommended. We should adhere to the JNC VIII guidelines for HTN management and the NCEPATP III guidelines for LDL-C management. 2. Maximize cardiac medications  3. Nephrology recommendations  4. Monitor on telemetry  5. Maintain test between 3.5-4.5 and magnesium greater than 2  6. Monitor H&H closely  7. GI/DVT prophylaxis  8. Stable from cardio standpoint for discharge.              Electronically signed by Angella Saunders DO on 7/6/22 at 2:37 PM EDT

## 2022-07-06 NOTE — PROGRESS NOTES
Hospitalist Progress Note      PCP: Jamel Ortez MD    Date of Admission: 6/30/2022    Chief Complaint:  No acute events, afebrile, stable HD    Medications:  Reviewed    Infusion Medications    dextrose       Scheduled Medications    lidocaine  3 patch TransDERmal Daily    polyethylene glycol  17 g Oral Daily    insulin lispro  0-12 Units SubCUTAneous TID WC    insulin lispro  0-6 Units SubCUTAneous Nightly    heparin (porcine)  2,000 Units IntraVENous Once    epoetin chadwick-epbx  10,000 Units SubCUTAneous Once    vancomycin (VANCOCIN) intermittent dosing (placeholder)   Other RX Placeholder    ARIPiprazole  5 mg Oral Daily    aspirin EC  81 mg Oral Daily    atorvastatin  40 mg Oral Nightly    insulin glargine  35 Units SubCUTAneous Nightly    isosorbide mononitrate  120 mg Oral Daily    magnesium oxide  400 mg Oral Daily    melatonin  10 mg Oral Nightly    midodrine  5 mg Oral Once per day on Mon Wed Fri    miconazole   Topical BID    sertraline  50 mg Oral Nightly     PRN Meds: camphor-menthol-methyl salicylate, melatonin, acetaminophen **OR** acetaminophen, prochlorperazine, albuterol, hydrOXYzine HCl, glucose, dextrose bolus **OR** dextrose bolus, glucagon (rDNA), dextrose      Intake/Output Summary (Last 24 hours) at 7/6/2022 1132  Last data filed at 7/6/2022 0539  Gross per 24 hour   Intake 1250 ml   Output 3050 ml   Net -1800 ml       Exam:    BP (!) 100/38   Pulse 81   Temp 97.9 °F (36.6 °C) (Oral)   Resp 16   Ht 5' 7\" (1.702 m)   Wt 191 lb (86.6 kg)   LMP  (LMP Unknown)   SpO2 90%   BMI 29.91 kg/m²     General appearance: appears stated age and cooperative. Respiratory:  clear to auscultation bilaterally  Cardiovascular: Regular rate and rhythm, S1/S2  Abdomen: Soft, active bowel sounds. Musculoskeletal: No edema bilaterally.       Labs:   Recent Labs     07/04/22  0307 07/05/22  0550 07/06/22  0558   WBC 5.8 7.0 6.5   HGB 9.1* 9.7* 10.5*   HCT 26.3* 27.7* 29.8* * 175 196     Recent Labs     07/04/22  0307 07/05/22  0549 07/06/22  0557   * 130* 139   K 4.3 4.6 4.3   CL 90* 92* 99   CO2 27 27 29   BUN 27* 31* 15   CREATININE 4.16* 4.51* 3.03*   CALCIUM 9.0 9.5 9.5   PHOS  --  3.2 2.6     Recent Labs     07/04/22  0307   AST 21   ALT 16   BILITOT 0.9*   ALKPHOS 117     No results for input(s): INR in the last 72 hours. No results for input(s): Schuyler Baxter in the last 72 hours. Urinalysis:      Lab Results   Component Value Date/Time    NITRU Negative 04/20/2022 10:45 AM    WBCUA >100 04/20/2022 10:45 AM    BACTERIA MANY 04/20/2022 10:45 AM    RBCUA 3-5 04/20/2022 10:45 AM    BLOODU TRACE 04/20/2022 10:45 AM    SPECGRAV 1.014 04/20/2022 10:45 AM    GLUCOSEU Negative 04/20/2022 10:45 AM       Radiology:  MRI LUMBAR SPINE WO CONTRAST   Final Result      Degenerative changes of the lumbar spine as detailed. Localizer images demonstrate cervical spine degenerative changes with apparent multilevel high-grade spinal canal stenosis. MRI BRAIN WO CONTRAST   Final Result      No acute ischemia or acute intracranial process. Generalized parenchymal volume loss and nonspecific white matter findings most compatible with chronic small vessel ischemic changes in a patient of this age. IR REMOVE TUNNELED CVAD WO SQ PORT/PUMP   Final Result   1. Successful removal of a tunneled internal jugular vein dialysis catheter. DIAGNOSIS: MRSA bacteremia       RADIATION DOSE: 0.25 mGy         FINDINGS:      Patient was placed supine on the procedure table. The chest and neck including the existing tunneled catheter were prepped and draped in sterile fashion. Lidocaine was used to anesthetize the skin tunnel site. Hemostats were used to dissect the Dacron    anchor cuff from the surrounding tissue. Under gentle traction and while holding pressure at the neck venotomy site, the catheter was easily removed.  The catheter was checked for any weakness, IDDM2, ESRD on HD, anemia,  schizophrenia, COVID-19 pneumonia, obesity, T11 fracture who presented with weakness and falls. E.Faecalis BSI  - repeat blood cultures and dialysis catheter tip no growth  - on IV Vanco  - NOELLE was negative per cardiology  - management per ID    Acute right 10th and 11th rib fractures s/p fall  - pain control    Confusion/AMS, weakness, falls  - MRI brain was negative for acute findings  - MRI of the lumbar spine showed DDD, no significant stenosis  - MRI of the cervical spine is pending  - followed by neurology     ESRD on HD  - management per nephrology    CAD s/p CABG/PCI  - on ASA, Lipitor therapy  - followed by cardiology    IDDM2 with hyperglycemia  - on ISS    Anemia  - follow H/H    Thrombocytopenia  - improving, monitor    Schizophrenia  - on Abilify, zoloft    Severe malnutrition  - Diet: ADULT DIET;  Dysphagia - Minced and Moist; 3 carb choices (45 gm/meal)   - followed by dietitian    Code Status: Full Code      Disposition - acute rehab when OK with ID and neurology        Electronically signed by Loki Kelly MD on 7/6/2022 at 11:32 AM

## 2022-07-06 NOTE — CARE COORDINATION
Met with patient again regarding ARU transfer when medially cleared and patient in agreement. MRI Cervical remains pending at this time.  Electronically signed by Raad Naqvi RN on 7/6/22 at 4:29 PM EDT

## 2022-07-06 NOTE — PROGRESS NOTES
Physical Therapy Missed Treatment   Facility/Department: [facility] MED SURG [id number]    NAME: Afua Mas  Patient Status:   : 1960 (60 y.o.)  MRN: [id number]  Account: [de-identified]  Gender: female        [] Patient Declines PT Treatment            [x] Patient Unavailable:     Pt has order for cervical MRI in chart. Will hold tx until completed. Will attempt PT Treatment again at earliest convenience.         Electronically signed by Susi Alvarenga PTA on 22 at 9:04 AM EDT

## 2022-07-06 NOTE — PROGRESS NOTES
Nephrology Progress Note  Vitals stable  Assessment:  ESRDX  MRSA  Mitral valve replacement mildly remote  Schizophrenia  DM type-2  Hypertension  Hx Seizures  OHDX   Anemia of renal disease       Plan:   - continue IHD TTS   - on retacrit        Patient Active Problem List:     Atherosclerotic heart disease of native coronary artery with unspecified angina pectoris (HCC)     Schizophrenia, paranoid, chronic     Metabolic syndrome     Vitamin B 12 deficiency     Cerebral microvascular disease     Mixed hyperlipidemia     Other hammer toe (acquired)     Vitamin D insufficiency     Incontinence of urine     Diabetic nephropathy with proteinuria ([address]     Essential (primary) hypertension     History of type C viral hepatitis     Urinary incontinence due to cognitive impairment     History of seizures     Stented coronary artery-plan is to stay on Plavix indefinately per Dr Andriy Ontiveros     Other specified diabetes mellitus with diabetic neuropathy, unspecified ([address]     Controlled type 2 diabetes mellitus with diabetic neuropathy, with long-term current use of insulin (HCC)     Hemiparesis, left (HCC)     Angina, class II ([address]     Pain, unspecified     Tardive dyskinesia     Shortness of breath     Chronic diastolic congestive heart failure (HCC)     Sleep apnea, unspecified     Pulmonary hypertension, unspecified (HCC)     Class 2 severe obesity with serious comorbidity and body mass index (BMI) of 36.0 to 36.9 in [facility])     Edema     Closed supracondylar fracture of right humerus     Other chronic pain     Palliative care patient     Recurrent falls     Renal failure     Difficulty in walking     ESRD (end stage renal disease) on dialysis ([address]     Weakness     Other seizures (HCC)     Moderate persistent asthma without complication     [id number]     Post PTCA     Falls frequently     Chest wall contusion, left, initial encounter     Headache, unspecified     Paranoid schizophrenia (HCC)     Compression fracture of spine ([facility]     Closed rib fracture     Depression     Chronic obstructive pulmonary disease (HCC)     Critical illness polyneuropathy ([facility])     Multiple closed fractures of ribs of right side     Nonrheumatic mitral valve regurgitation     Nonrheumatic tricuspid valve regurgitation     Need for extended care facility     Chronic pain of both shoulders     Hemodialysis-associated hypotension     Anginal chest pain at rest [facility])     Chest pain     Unstable angina ([facility])     NSTEMI (non-ST elevated myocardial infarction) ([facility])     CKD (chronic kidney disease) stage 4, GFR 15-29 ml/min ([facility])     Hyperkalemia     Impaired mobility and activities of daily living     Dialysis patient [facility])     Unspecified open wound of right upper arm, initial encounter     Multiple closed fractures of right lower extremity and ribs     Closed T11 fracture ([facility])     Encephalopathy acute     MRSA bacteremia     Sepsis due to Enterococcus ([facility]     Local infection due to central venous catheter      Subjective:  Admit Date: 6/30/2022    Interval History: had NOELLE yesterday that showed no mass, thrombus or shunts, mild TR w/ nml LV    Medications:  Scheduled Meds:   lidocaine  3 patch TransDERmal Daily    polyethylene glycol  17 g Oral Daily    insulin lispro  0-12 Units SubCUTAneous TID WC    insulin lispro  0-6 Units SubCUTAneous Nightly    heparin (porcine)  2,000 Units IntraVENous Once    epoetin chadwick-epbx  10,000 Units SubCUTAneous Once    vancomycin (VANCOCIN) intermittent dosing (placeholder)   Other RX Placeholder    ARIPiprazole  5 mg Oral Daily    aspirin EC  81 mg Oral Daily    atorvastatin  40 mg Oral Nightly    insulin glargine  35 Units SubCUTAneous Nightly    isosorbide mononitrate  120 mg Oral Daily    magnesium oxide  400 mg Oral Daily    melatonin  10 mg Oral Nightly    midodrine  5 mg Oral Once per day on Mon Wed Fri    miconazole   Topical BID    sertraline  50 mg Oral Nightly     Continuous Infusions:   dextrose         CBC:   Recent Labs     07/05/22  0550 07/06/22  0558   WBC 7.0 6.5   HGB 9.7* 10.5*    196     CMP:    Recent Labs     07/04/22  0307 07/05/22  0549 07/06/22  0557   * 130* 139   K 4.3 4.6 4.3   CL 90* 92* 99   CO2 27 27 29   BUN 27* 31* 15   CREATININE 4.16* 4.51* 3.03*   GLUCOSE 154* 147* 86   CALCIUM 9.0 9.5 9.5   LABGLOM 10.8* 9.8* 15.5*     Troponin: No results for input(s): TROPONINI in the last 72 hours. BNP: No results for input(s): BNP in the last 72 hours. INR: No results for input(s): INR in the last 72 hours. Lipids: No results for input(s): CHOL, LDLDIRECT, TRIG, HDL, AMYLASE, LIPASE in the last 72 hours. Liver:   Recent Labs     07/04/22  0307 07/05/22  0549 07/06/22  0557   AST 21  --   --    ALT 16  --   --    ALKPHOS 117  --   --    PROT 6.1*  --   --    LABALBU 3.3*   < > 3.5   BILITOT 0.9*  --   --     < > = values in this interval not displayed. Iron:  No results for input(s): IRONS, FERRITIN in the last 72 hours. Invalid input(s): LABIRONS  Urinalysis: No results for input(s): UA in the last 72 hours.     Objective:  Vitals: BP (!) 100/38   Pulse 81   Temp 97.9 °F (36.6 °C) (Oral)   Resp 16   Ht 5' 7\" (1.702 m)   Wt 191 lb (86.6 kg)   LMP  (LMP Unknown)   SpO2 90%   BMI 29.91 kg/m²    Wt Readings from Last 3 Encounters:   07/06/22 191 lb (86.6 kg)   06/22/22 217 lb (98.4 kg)   06/06/22 215 lb 9.8 oz (97.8 kg)      24HR INTAKE/OUTPUT:      Intake/Output Summary (Last 24 hours) at 7/6/2022 1159  Last data filed at 7/6/2022 0539  Gross per 24 hour   Intake 1250 ml   Output 3050 ml   Net -1800 ml       General: alert, in no apparent distress  HEENT: normocephalic, atraumatic, anicteric  Lungs: non-labored respirations, clear to auscultation bilaterally  Heart: regular rate and rhythm, no murmurs or rubs  Abdomen: soft, non-tender, non-distended  Ext: no cyanosis, no peripheral edema  Neuro: alert and oriented, no gross abnormalities    Electronically

## 2022-07-06 NOTE — PROGRESS NOTES
Pt rested quietly through the night. Incontinent of large soft stool. Voices no needs, wants or complaints at this time. Pt free from falls.

## 2022-07-06 NOTE — PROGRESS NOTES
[facility] OCC[facility]ATIONAL [facility]ER[facility] MED SURG TREATMENT NOTE     Date: 2022  Patient Name: Mirta Santiago        MRN: [id number]  Account: [de-identified]   : 1960  (57 y.o.)  [facility]    Chart Review:    Restrictions  Restrictions/Precautions  Restrictions/Precautions: Fall Risk  Position Activity Restriction  Other position/activity restrictions: L Rib fractures - 10 and 11 per patient     Safety:  Safety Devices  Type of Devices: All fall risk precautions in place;Call light within reach; Left in chair;Chair alarm in place    Patient's birthday verified: Yes    Subjective:    Pain at start of treatment: Yes: 6/10    Pain at end of treatment: Yes: 6/10    Location: Bilateral Knees  Nursing notified: Declined  Intervention: None  Pt reports that she had pain medication. Objective: Pt declined full ADL reporting that she already washed up this morning. Pt agreeable to demonstrating LB dressing and transfers. Pt completed as follows. ADL Status:  LE Dressing: Minimal assistance  LE Dressing Skilled Clinical Factors: To doff/don bilateral socks and pants. Pt able to doff bilateral socks using figure four. Pt able to don right sock using figure four. Pt required assistance to don left sock. Pt able to bend down in order to don pants past feet. SBA for safety in standing while pt donned pants past hips. Pt required assistance to doff pants past left foot.      Transfers:  Sit to stand: Stand by assistance  Stand to sit: Contact guard assistance  Transfer Comments: Increased time and effort    Therapy key for assistance levels -   Independent/Mod I = Pt. is able to perform task with no assistance but may require a device   Stand by assistance = Pt. does not perform task at an independent level but does not need physical assistance, requires verbal cues  Minimal, Moderate, Maximal Assistance = Pt. requires physical assistance (25%, 50%, 75% assist from helper) for task but is able to actively participate in task   Dependent = Pt. requires total assistance with task and is not able to actively participate with task completion      Functional Endurance:  Activity Tolerance  Activity Tolerance: Patient limited by fatigue;Patient limited by pain    Treatment consisted of:  ADL training    Assessment/Discharge Disposition: Pt would benefit from continued Occupational Therapy to increase strength, activity tolerance, Liberty with functional transfers, and Liberty with ADL/IADL completion. [employer]  How much help for putting on and taking off regular lower body clothing?: A Little  How much help for Bathing?: A Little  How much help for Toileting?: A Little  How much help for putting on and taking off regular upper body clothing?: A Little  How much help for taking care of personal grooming?: A Little  How much help for eating meals?: None  AM-PAC Inpatient Daily Activity Raw Score: 19  AM-PAC Inpatient ADL T-Scale Score : 40.22  ADL Inpatient CMS 0-100% Score: 42.8  ADL Inpatient CMS G-Code Modifier : CK    Plan:  Continue OT per POC    Patient Education:  Patient Education  Education Given To: Patient  Education Provided: Plan of Care;Transfer Training  Education Method: Verbal  Barriers to Learning: None  Education Outcome: Verbalized understanding;Demonstrated understanding;Continued education needed    Equipment recommendations:  Pt reports that she has hip kit at home. Will continue to assess for other needs.      Goals/Plan of care addressed during this session:  Patient Goal: Patient goals : return home    Improve Liberty with ADLs and Improve Liberty with Functional Transfers    Therapy Time:   Individual Group Co-Treat   Time In 1109       Time Out 1117         Minutes 8         ADL/IADL trainin minutes    Electronically signed by:    MARY Spencer    2022, 11:25 AM

## 2022-07-06 NOTE — PROGRESS NOTES
Physical Therapy Med Surg Daily Treatment Note  Facility/Department: [facility]  Room: [facility]/D530-54       NAME: Breanna Coleman  : 1960 (57 y.o.)  MRN: [id number]  CODE STATUS: Full Code    Date of Service: 2022    Patient Diagnosis(es): Weakness [R53.1]  Physical deconditioning [R53.81]  Bilateral pleural effusion [J90]  At high risk for injury related to fall [Z91.81]  Concussion without loss of consciousness, initial encounter [S06.0X0A]  Fall from standing, initial encounter [W19. XXXA]  Chronic renal failure, stage 5 (HCC) [N18.5]  Closed fracture of multiple ribs of right side, initial encounter [S22.41XA]  Class 1 obesity due to excess calories with serious comorbidity and body mass index (BMI) of 33.0 to 33.9 in adult [E66.09, Z68.33]  MRSA bacteremia [R78.81, B95.62]   Chief Complaint   Patient presents with    Back Pain     since fall last night from standing     Patient Active Problem List    Diagnosis Date Noted    Unstable angina ([address] 2022    Chest pain     DJD (degenerative joint disease), cervical 2022    Closed head injury     MRSA bacteremia 2022    Sepsis due to Enterococcus ([address]     Local infection due to central venous catheter     Impaired mobility and activities of daily living 2022    Closed T11 fracture ([address] 2022    Encephalopathy acute 2022    Unspecified open wound of right upper arm, initial encounter 2022    Hyperkalemia 2022    CKD (chronic kidney disease) stage 4, GFR 15-29 ml/min ([address] 2022    Dialysis patient ([facility] 2022    Multiple closed fractures of right lower extremity and ribs 2022    NSTEMI (non-ST elevated myocardial infarction) ([facility] 2022    Anginal chest pain at rest [facility]) 2022    Hemodialysis-associated hypotension 10/22/2021    Chronic pain of both shoulders 2021    Nonrheumatic mitral valve regurgitation 2021    Nonrheumatic tricuspid valve regurgitation 07/07/2021    Need for extended care facility 07/07/2021    Multiple closed fractures of ribs of right side 04/05/2021    Depression 02/26/2021    Chronic obstructive pulmonary disease ([address] 02/26/2021    Critical illness polyneuropathy ([address] 02/26/2021    Compression fracture of spine ([address] 02/19/2021    Closed rib fracture 02/19/2021    Chest wall contusion, left, initial encounter 02/18/2021    Falls frequently 01/19/2021    Post PTCA     Headache, unspecified 01/03/2021    COVID-19 12/26/2020    Moderate persistent asthma without complication 48/16/3851    Weakness 08/05/2020    Difficulty in walking 07/27/2020    Atherosclerotic heart disease of native coronary artery with unspecified angina pectoris ([address] 07/03/2020    Essential (primary) hypertension 07/03/2020    Pain, unspecified 07/03/2020    ESRD (end stage renal disease) on dialysis ([address] 07/03/2020    Other seizures ([address] 07/03/2020    Paranoid schizophrenia ([address] 07/03/2020    Renal failure 06/28/2020    Recurrent falls 06/16/2020    Edema 12/23/2019    Closed supracondylar fracture of right humerus 12/23/2019    Other chronic pain 12/23/2019    Palliative care patient 12/23/2019    Class 2 severe obesity with serious comorbidity and body mass index (BMI) of 36.0 to 36.9 in adult [facility]) 12/03/2019    Sleep apnea, unspecified 11/05/2019    Pulmonary hypertension, unspecified ([address] 11/05/2019    Chronic diastolic congestive heart failure ([address] 10/04/2019    Shortness of breath 10/02/2019    Tardive dyskinesia 05/16/2019    Angina, class II ([address]     Controlled type 2 diabetes mellitus with diabetic neuropathy, with long-term current use of insulin ([address] 02/24/2017    Other specified diabetes mellitus with diabetic neuropathy, unspecified ([address] 08/04/2016    Stented coronary artery-plan is to stay on Plavix indefinately per Dr Alec Wiseman 06/02/2016    History of seizures     Urinary incontinence due to cognitive impairment     History of type C viral hepatitis     Diabetic nephropathy with proteinuria (HCC)     Incontinence of urine 04/07/2014    Vitamin D insufficiency 02/04/2014    Vitamin B 12 deficiency 06/05/2013    Cerebral microvascular disease 06/05/2013    Hemiparesis, left ([address] 01/01/2013    Schizophrenia, paranoid, chronic     Metabolic syndrome     Mixed hyperlipidemia 12/09/2010    Other hammer toe (acquired) 12/13/2007        Past Medical History:   Diagnosis Date    Angina at rest [facility]) 5/24/2020    Anxiety     CAD S/P percutaneous coronary angioplasty 2015, 2018    stents per dr Escobar Ontiveros    CHF (congestive heart failure) ([address]     CKD (chronic kidney disease) stage 4, GFR 15-29 ml/min ([address] 2/24/2018    CKD stage 4 due to type 2 diabetes mellitus ([address]     Contusion of right chest wall 2/16/2021    COPD (chronic obstructive pulmonary disease) ([address]     Diabetic nephropathy with proteinuria ([address] 2014    DJD (degenerative joint disease) of knee     Dr Tegan Bliss GERD (gastroesophageal reflux disease)     Hemiparesis, left ([address] 2013    [email or web address]    Hemodialysis patient [facility])     Hemodialysis-associated hypotension 10/22/2021    History of heart failure     History of seizures     History of type C viral hepatitis     HTN (hypertension)     Hyperlipidemia     Impaired mobility and activities of daily living     Mediastinal lymphadenopathy 2013    Myrna Suarez    Metabolic syndrome     Moderate persistent asthma without complication 9/01/3165    Need for extended care facility 7/7/2021    Neurogenic urinary incontinence 2013    Neuropathy in diabetes [facility])     Nonrheumatic mitral valve regurgitation 7/7/2021    Nonrheumatic tricuspid valve regurgitation 7/7/2021    Obesity (BMI 30-39. 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 Phelps Mo      x1 Dr. Morgan Miller, Dr Abdulkadir Nation 2018   Kady Lilli  08/10/2021    [facility]    DIAGNOSTIC CARDIAC CATH LAB PROCEDURE  10/02/2019    DIALYSIS CATHETER INSERTION Left 2022    Tunneled Symetrex 15.5F x 23cm hemodialysis catheter inserted by Dr. Faheem Manning, TOTAL ABDOMINAL (CERVIX REMOVED)      one ovary intact, Dr Eladio Martinez, menorrhagia    IR THROMBECTOMY PERCUT AVF  2022    IR THROMBECTOMY PERCUT AVF 2022 [facility] PROCED[facility]    IR TUNNELED CATHETER PLACEMENT GREATER THAN 5 YEARS  6/3/2022    IR TUNNELED CATHETER PLACEMENT GREATER THAN 5 YEARS 6/3/2022 [facility] PROCED[facility]    WA TOTAL KNEE ARTHROPLASTY Left 2018    LEFT KNEE TOTAL KNEE ARTHROPLASTY, SHAYNA, NERVE BLOCK performed by Alfa Brown MD at [address] [place] [address] PTCA      TOE AMPUTATION Right     TOTAL KNEE ARTHROPLASTY  2016    Dr Corin Zimmerman TUNNELED [address] Right 2020    tunneled HD catheter per Dr Emma Linn       Chart Reviewed: Yes  Family / Caregiver Present: No    Restrictions:  Restrictions/Precautions: Fall Risk  Position Activity Restriction  Other position/activity restrictions: L Rib fractures - 10 and 11 per patient    SUBJECTIVE:   Subjective: \"I am supposed to go to rehab.\"    Pain  Pain: 8/10 L knee. OBJECTIVE:        Bed mobility  Bed Mobility Comments: DNT pt in chair before and after tx. Transfers  Sit to Stand: Stand by assistance  Stand to sit: Stand by assistance  Comment: vc's for proper rollator brake management, good follow through. Ambulation  Surface: level tile  Device: Rollator  Assistance: Minimal assistance  Quality of Gait: poor rollator approximation, instability noted during retro steps. Gait Deviations: Slow Tere  Distance: 5' FWD/BWD x2  Comments: pt fatigued with this distance declines to ambulate further. PT Exercises  Exercise Treatment: pt given written HEP for supine/seated therex instructed on technique dosage and frequency of all exercises pt verbalizes understanding. A/AROM Exercises: seated AP/LAQ/Hip flexion/hip abd/add. x10          Activity Tolerance  Activity Tolerance: Patient tolerated treatment well          ASSESSMENT   Assessment: pt able to increase ambulation distance, complains of 8/10 pain in L knee but able to tolerate mobility. Discharge Recommendations:  Continue to assess pending progress         Goals  Short Term Goals  Time Frame for Short term goals: 2-4 weeks  Short term goal 1: Indep HEP for symptom mgmt  Short term goal 2: Indep bed mobility  Short term goal 3: Indep transfers bed to chair with assistive device  Short term goal 4: Ambulate with rollator Indep /=50' to allow safe return home. Patient Goals   Patient goals : Improve function    PLAN    Plan: 1 time a day 3-6 times a week  Safety Devices  Type of Devices:  All fall risk precautions in place,Call light within reach,Chair alarm in place,Left in chair     AMPAC (6 CLICK) Shon Wilson 28 Inpatient Mobility Raw Score : 16      Therapy Time   Individual   Time In 1004   Time Out 1016   Minutes 12      Trsf: 8  Gait: 2  Therex: 2        Patrick Crawley PTA, 07/06/22 at 10:26 AM         Definitions for assistance levels  Independent = pt does not require any physical supervision or assistance from another person for activity completion. Device may be needed.   Stand by assistance = pt requires verbal cues or instructions from another person, close to but not touching, to perform the activity  Minimal assistance= pt performs 75% or more of the activity; assistance is required to complete the activity  Moderate assistance= pt performs 50% of the activity; assistance is required to complete the activity  Maximal assistance = pt performs 25% of the activity; assistance is required to complete the activity  Dependent = pt requires total physical assistance to accomplish the task

## 2022-07-06 NOTE — PROGRESS NOTES
Infectious Disease     Patient Name: Chandan Mejia  Date: 7/6/2022  YOB: 1960  Medical Record Number: [id number]        Sepsis with Enterococcus  Infected dialysis cath     mitral valve regurgitation  coronary artery bypass graft x1 vessel with tricuspid valve repair on 1/21/2022      Blood cultures from admission 2 sets growing gram-positive cocci in chains  Identified as Enterococcus faecalis by PCR  Patient currently on vancomycin    Patient has dialysis catheter in left chest which has been used while her dialysis fistula in right upper extremity has been repaired she states they are now using the fistula in right arm          Review of Systems   Constitutional: Negative for chills, diaphoresis, fatigue and fever. Respiratory: Negative for cough and shortness of breath. Cardiovascular: Negative. Gastrointestinal: Negative. Physical Exam  Constitutional:       Appearance: She is ill-appearing. Cardiovascular:      Heart sounds: Murmur heard. Pulmonary:      Effort: Pulmonary effort is normal. No respiratory distress. Breath sounds: Normal breath sounds. No wheezing, rhonchi or rales. Abdominal:      General: Abdomen is flat. Bowel sounds are normal. There is no distension. Palpations: Abdomen is soft. There is no mass. Tenderness: There is no abdominal tenderness. There is no guarding. Blood pressure (!) 100/38, pulse 81, temperature 97.9 °F (36.6 °C), temperature source Oral, resp. rate 16, height 5' 7\" (1.702 m), weight 191 lb (86.6 kg), SpO2 90 %, not currently breastfeeding.       .   Lab Results   Component Value Date    WBC 6.5 07/06/2022    HGB 10.5 (L) 07/06/2022    HCT 29.8 (L) 07/06/2022    MCV 92.1 07/06/2022     07/06/2022     Lab Results   Component Value Date/Time     07/06/2022 05:57 AM    K 4.3 07/06/2022 05:57 AM    K 4.3 07/04/2022 03:07 AM    CL 99 07/06/2022 05:57 AM    CO2 29 07/06/2022 05:57 AM    BUN 15 07/06/2022 05:57 AM    CREATININE 3.03 07/06/2022 05:57 AM    GLUCOSE 86 07/06/2022 05:57 AM    GLUCOSE 419 07/30/2021 08:16 AM    CALCIUM 9.5 07/06/2022 05:57 AM         7/1/22 1613   Culture Catheter Tip Culture, Catheter Tip:  NO GROWTH   Performed at [address] [place] [address]   [phone]        6/30/22 8574   Culture, Blood Id Sensitivity  Abnormal   Cult,Blood:  POSITIVE Blood Culture   Performed at [address] [place] [address]   [phone]   P      Organism Enterococcus faecalis Abnormal  P    Resulting Agency [address] [facility]          Susceptibility      Enterococcus faecalis (2)    Antibiotic Interpretation Microscan  Method Status    ampicillin Sensitive <=2 mcg/mL BACTERIAL SUSCEPTIBILITY PANEL BY DIYA     vancomycin Sensitive 1 mcg/mL BACTERIAL SUSCEPTIBILITY PANEL BY DIYA                   7/1/22 1613   Culture Catheter Tip Culture, Catheter Tip:  NO GROWTH          ASSESSMENT:  PLAN:    Sepsis with Enterococcus  Infected dialysis cath  Continue vancomycin    Repeat blood cultures no growth

## 2022-07-06 NOTE — PROGRESS NOTES
Neurology Follow up    SUBJECTIVE: Patient seen and examined for neurology follow-up for bilateral lower extremity weakness and falls. Complains of back pain and weakness in the lower extremities. Patient has MRSA bacteremia. Afebrile. NOELLE was negative. Awaiting MRI of cervical spine. Complaining of left knee pain. Patient was followed up today and still continues to have lower extremity weakness. She is able to transfer but not quite able to walk much.   Strength still remains 4/5        Current Facility-Administered Medications   Medication Dose Route Frequency Provider Last Rate Last Admin    lidocaine 4 % external patch 3 patch  3 patch TransDERmal Daily Jennifer Scullin, DO   3 patch at 07/06/22 0840    camphor-menthol-methyl salicylate (BENGAY ULTRA STRENGTH) 4-10-30 % cream   Apply externally TID PRN Jennifer Scullin, DO        polyethylene glycol (GLYCOLAX) packet 17 g  17 g Oral Daily Nicoletto Bolzan-Roche, APRN - CNP   17 g at 07/04/22 0823    insulin lispro (HUMALOG) injection vial 0-12 Units  0-12 Units SubCUTAneous TID WC Nicoletto Bolzan-Roche, APRN - CNP   2 Units at 07/04/22 1716    insulin lispro (HUMALOG) injection vial 0-6 Units  0-6 Units SubCUTAneous Nightly Nicoletto Bolzan-Roche, APRN - CNP   1 Units at 07/05/22 2045    heparin (porcine) injection 2,000 Units  2,000 Units IntraVENous Once Estrella Crock, DO        epoetin chadwick-epbx (RETACRIT) injection 10,000 Units  10,000 Units SubCUTAneous Once Estrella Crock, DO        vancomycin (VANCOCIN) intermittent dosing (placeholder)   Other RX Placeholder Lurlene Masker, DO        melatonin tablet 3 mg  3 mg Oral Nightly PRN Lurlene Masker, DO        acetaminophen (TYLENOL) tablet 650 mg  650 mg Oral Q6H PRN Lurlene Masker, DO   650 mg at 07/06/22 9716    Or    acetaminophen (TYLENOL) suppository 650 mg  650 mg Rectal Q6H PRN Lurlene Masker, DO   650 mg at 07/01/22 1354    prochlorperazine (COMPAZINE) injection 10 mg  10 mg IntraVENous TID PRN Ramana Shoulder, DO   10 mg at 07/05/22 1825    albuterol (PROVENTIL) nebulizer solution 2.5 mg  2.5 mg Nebulization Q4H PRN Ramana Shoulder, DO        ARIPiprazole (ABILIFY) tablet 5 mg  5 mg Oral Daily Ramana Shoulder, DO   5 mg at 07/06/22 7924    aspirin EC tablet 81 mg  81 mg Oral Daily Ramana Shoulder, DO   81 mg at 07/06/22 0837    atorvastatin (LIPITOR) tablet 40 mg  40 mg Oral Nightly Ramana Shoulder, DO   40 mg at 07/05/22 2038    hydrOXYzine HCl (ATARAX) tablet 25 mg  25 mg Oral TID PRN Ramana Shoulder, DO        insulin glargine (LANTUS) injection vial 35 Units  35 Units SubCUTAneous Nightly Ramana Shoulder, DO   35 Units at 07/05/22 2043    isosorbide mononitrate (IMDUR) extended release tablet 120 mg  120 mg Oral Daily Ramana Shoulder, DO   120 mg at 07/06/22 0229    magnesium oxide (MAG-OX) tablet 400 mg  400 mg Oral Daily Ramana Shoulder, DO   400 mg at 07/06/22 3653    melatonin disintegrating tablet 10 mg  10 mg Oral Nightly Ramana Shoulder, DO   10 mg at 07/05/22 2133    midodrine (PROAMATINE) tablet 5 mg  5 mg Oral Once per day on Mon Wed Fri Roxy Hendrix, DO   5 mg at 07/06/22 6658    miconazole (MICOTIN) 2 % powder   Topical BID Ramana Shoulder, DO   Given at 07/05/22 2039    sertraline (ZOLOFT) tablet 50 mg  50 mg Oral Nightly Ramana Shoulder, DO   50 mg at 07/05/22 2038    glucose chewable tablet 16 g  4 tablet Oral PRN Ramana Shoulder, DO        dextrose bolus 10% 125 mL  125 mL IntraVENous PRN Ramana Shoulder, DO        Or    dextrose bolus 10% 250 mL  250 mL IntraVENous PRN Ramana Shoulder, DO        glucagon (rDNA) injection 1 mg  1 mg IntraMUSCular PRN Ramana Shoulder, DO        dextrose 5 % solution  100 mL/hr IntraVENous PRN Ramana Shoulder, DO           PHYSICAL EXAM:    BP (!) 100/38   Pulse 81   Temp 97.9 °F (36.6 °C) (Oral)   Resp 16   Ht 5' 7\" (1.702 m)   Wt 191 lb (86.6 kg)   LMP  (LMP Unknown)   SpO2 90%   BMI 29.91 kg/m²    General Appearance:      Skin:  normal  CVS - Normal sounds, No murmurs , No carotid Bruits  RS -CTA  Abdomen Soft, BS present  Review of Systems   Constitutional: Negative for fever. HENT: Negative for ear pain, tinnitus and trouble swallowing. Eyes: Negative for photophobia and visual disturbance. Respiratory: Negative for choking and shortness of breath. Cardiovascular: Negative for chest pain and palpitations. Gastrointestinal: Negative for nausea and vomiting. Musculoskeletal: Positive for arthralgias, back pain, gait problem and myalgias. Negative for joint swelling, neck pain and neck stiffness. Skin: Negative for color change. Allergic/Immunologic: Negative for food allergies. Neurological: Positive for weakness. Negative for dizziness, tremors, seizures, syncope, facial asymmetry, speech difficulty, light-headedness, numbness and headaches. Psychiatric/Behavioral: Negative for behavioral problems, confusion, hallucinations and sleep disturbance.    Examination as noted above  Mental Status Exam:             Level of Alertness:   awake            Orientation:   person, place, time                    Attention/Concentration:  normal            Language:  normal      Funduscopic Exam:     Cranial Nerves            Cranial nerve III           Pupils:  equal, round, reactive to light      Cranial nerves III, IV, VI           Extraocular Movements: intact      Cranial nerve V           Facial sensation:  intact      Cranial nerve VII           Facial strength: intact      Cranial nerve VIII           Hearing:  intact      Cranial nerve IX           Palate:  intact      Cranial nerve XI         Shoulder shrug:  intact      Cranial nerve XII          Tongue movement:  normal    Motor: No extremity strength of 4/ 5 with areflexia            Sensory:        Pinprick             Right Upper Extremity:  normal             Left Upper Extremity:  normal             Right Lower Extremity:  normal             Left Lower Extremity:  normal           Vibration Touch            Proprioception                 Coordination:           Finger/Nose   Right:  normal              Left:  normal          Heel-Knee-Shin                Right:  normal              Left:  normal          Rapid Alternating Movements              Right:  normal              Left:  normal          Gait:                       Casual: proximal muscle weakness is notable upon standing reflexes:             Deep Tendon Reflexes:             Reflexes are 2 +             Plantar response:                Right:  downgoing     Patient very much unchanged          left:  downgoing    Vascular:  Cardiac Exam:  normal         FL MODIFIED BARIUM SWALLOW W VIDEO    Result Date: 7/1/2022  FL MODIFIED BARIUM SWALLOW W VIDEO : 7/1/2022 CLINICAL HISTORY:  Choking with meal, concern for aspiration/dysphagia . COMPARISON: 5/10/2013. TECHNIQUE: Lateral videofluoroscopy was provided during speech therapy evaluation during ingestion of various barium liquids and semisolids. A total of 1.6 minutes of fluoroscopy time was used, with a total of  10 fluoroscopic series and one still saved. No diagnostic images were saved. Oral contrast:  50 mL BaSO4 FINDINGS: Mild to moderate oral dysphagia is observed with premature entry into the piriform sinuses and mild to moderate oral residual that cleared with independent re-swallowing. Endovaginal laryngeal penetration was observed with thin and soft barium consistencies that immediately cleared. Mild to moderate residual within the vallecula cleared with recent swallowing. No tracheal aspiration was identified. A full report will be made by the speech pathology team.     MILD TO MODERATELY ABNORMAL MODIFIED BARIUM SWALLOW. NO TRACHEAL ASPIRATION IDENTIFIED.  A FULL REPORT WILL BE MADE BY THE SPEECH PATHOLOGY TEAM.       Recent Labs     07/04/22  0307 07/05/22  0550 07/06/22  0558   WBC 5.8 7.0 6.5   HGB 9.1* 9.7* 10.5*   * 175 196     Recent Labs     07/04/22  0307 07/05/22  0549 07/06/22  0557   * 130* 139   K 4.3 4.6 4.3   CL 90* 92* 99   CO2 27 27 29   BUN 27* 31* 15   CREATININE 4.16* 4.51* 3.03*   GLUCOSE 154* 147* 86     Recent Labs     07/04/22  0307   BILITOT 0.9*   ALKPHOS 117   AST 21   ALT 16     Lab Results   Component Value Date/Time    PROTIME 16.3 06/30/2022 07:30 AM    INR 1.3 06/30/2022 07:30 AM     No results found for: LITHIUM, DILFRTOT, VALPROATE    ASSESSMENT AND PLAN  With ataxia with falls with lower extremity weakness. A lumbar canal stenosis must be ruled out. Recommend an MRI of the lumbosacral spine and the brain given that she has underlying bacteremia to make sure this is not endocarditis. She also has peripheral neuropathy to explain her lower extremity weakness as well had findings discussed. Exam as noted above. Patient has some ataxia and lower extremity weakness. We had recommended a lumbar spine evaluation with an MRI with an MRI I of the brain to make sure there is no septic emboli given underlying blood cultures. MRI is pending. Complain of knee pain which may be causing some degree with difficult ambulation. Exam remains unchanged with lower extremity weakness and back pain with areflexia. MRI was not done as there appeared to be some issues about stents and cardiology has been consulted for the same. Will await for the MRI prior to any other recommendations    7/5/22:   MRI of the brain negative for acute findings  MRI of the lumbar spine with degenerative changes and no significant canal stenosis. Localizer identified high-grade canal stenosis of the cervical spine. Will obtain dedicated cervical spine MRI. Patient with sepsis with Enterococcus bacteremia. Infected dialysis catheter. Continues on vancomycin. NOELLE today.     I have personally performed a face to face diagnostic evaluation on this patient, reviewed all data and investigations, and am the sole provider of all clinical decisions on the neurological status of this patient. Patient was examined and unchanged examination. MRI was reviewed and is negative for any acute bleeds. MRI of the lumbar spine did not show stenosis. On the localizer there was some question of cervical spinal stenosis and will obtain a dedicated MRI of the cervical spine. MRI reviewed in person and patient examined and within 60% time spent in evaluating and managing this patient myself. I have personally performed a face to face diagnostic evaluation on this patient, reviewed all data and investigations, and am the sole provider of all clinical decisions on the neurological status of this patient. Semination as noted above patient still continues to lower EXTR weakness but I do not see any myelopathic signs. MRI of the cervical spine is still pending. Patient has no session of septic emboli. Findings were discussed with the patient NOELLE is pending. More than 60% time spent on evaluating and managing this patient by myself      Darren Murillo MD, [address], American Board of Psychiatry & Neurology  Board Certified in Vascular Neurology  Board Certified in Neuromuscular Medicine  Certified in . [address]         7/6/2022:  MRI of the cervical spine remains pending.   NOELLE negative  Rehab eval pending

## 2022-07-06 NOTE — FLOWSHEET NOTE
Pt awake in room offered to place in chair and she said yes. Pt did the entire get up on her own. I just stood by to make sure she didn't loose her balance. Assisted with opening packages for breakfast and a complete bath given by PCA. Pt is very alert and orient and is aware she will be going to Rehab today. Electronically signed by Cami Pena LPN on 2/7/2976 at 87:57 AM

## 2022-07-07 ENCOUNTER — HOSPITAL ENCOUNTER (INPATIENT)
Dept: INTERVENTIONAL RADIOLOGY/VASCULAR | Age: 62
Discharge: HOME | DRG: 314 | End: 2022-07-09
Payer: MEDICARE

## 2022-07-07 VITALS
SYSTOLIC BLOOD PRESSURE: 166 MMHG | DIASTOLIC BLOOD PRESSURE: 75 MMHG | RESPIRATION RATE: 14 BRPM | HEART RATE: 86 BPM | OXYGEN SATURATION: 95 %

## 2022-07-07 LAB
ALBUMIN SERPL-MCNC: 3.6 G/DL (ref 3.5–4.6)
ANION GAP SERPL CALCULATED.3IONS-SCNC: 11 MEQ/L (ref 9–15)
BUN BLDV-MCNC: 24 MG/DL (ref 8–23)
CALCIUM SERPL-MCNC: 9.6 MG/DL (ref 8.5–9.9)
CHLORIDE BLD-SCNC: 96 MEQ/L (ref 95–107)
CO2: 27 MEQ/L (ref 20–31)
CREAT SERPL-MCNC: 3.7 MG/DL (ref 0.5–0.9)
GFR AFRICAN AMERICAN: 14.9
GFR NON-AFRICAN AMERICAN: 12.3
GLUCOSE BLD-MCNC: 120 MG/DL (ref 70–99)
GLUCOSE BLD-MCNC: 129 MG/DL (ref 70–99)
GLUCOSE BLD-MCNC: 192 MG/DL (ref 70–99)
GLUCOSE BLD-MCNC: 66 MG/DL (ref 70–99)
HCT VFR BLD CALC: 26.6 % (ref 37–47)
HEMOGLOBIN: 9.6 G/DL (ref 12–16)
MAGNESIUM: 2.2 MG/DL (ref 1.7–2.4)
MCH RBC QN AUTO: 32.8 PG (ref 27–31.3)
MCHC RBC AUTO-ENTMCNC: 36 % (ref 33–37)
MCV RBC AUTO: 91 FL (ref 82–100)
PDW BLD-RTO: 14.3 % (ref 11.5–14.5)
PERFORMED ON: ABNORMAL
PHOSPHORUS: 3 MG/DL (ref 2.3–4.8)
PLATELET # BLD: 235 K/UL (ref 130–400)
POTASSIUM SERPL-SCNC: 4.4 MEQ/L (ref 3.4–4.9)
RBC # BLD: 2.92 M/UL (ref 4.2–5.4)
SODIUM BLD-SCNC: 134 MEQ/L (ref 135–144)
VANCOMYCIN RANDOM: 13.3 UG/ML (ref 10–40)
WBC # BLD: 9 K/UL (ref 4.8–10.8)

## 2022-07-07 PROCEDURE — 83735 ASSAY OF MAGNESIUM: CPT

## 2022-07-07 PROCEDURE — 99231 SBSQ HOSP IP/OBS SF/LOW 25: CPT | Performed by: RADIOLOGY

## 2022-07-07 PROCEDURE — 85027 COMPLETE CBC AUTOMATED: CPT

## 2022-07-07 PROCEDURE — 36558 INSERT TUNNELED CV CATH: CPT

## 2022-07-07 PROCEDURE — 92526 ORAL FUNCTION THERAPY: CPT

## 2022-07-07 PROCEDURE — 6360000002 HC RX W HCPCS: Performed by: RADIOLOGY

## 2022-07-07 PROCEDURE — 80202 ASSAY OF VANCOMYCIN: CPT

## 2022-07-07 PROCEDURE — 36415 COLL VENOUS BLD VENIPUNCTURE: CPT

## 2022-07-07 PROCEDURE — 36558 INSERT TUNNELED CV CATH: CPT | Performed by: RADIOLOGY

## 2022-07-07 PROCEDURE — 80069 RENAL FUNCTION PANEL: CPT

## 2022-07-07 PROCEDURE — 97116 GAIT TRAINING THERAPY: CPT

## 2022-07-07 PROCEDURE — 77001 FLUOROGUIDE FOR VEIN DEVICE: CPT | Performed by: RADIOLOGY

## 2022-07-07 PROCEDURE — 2500000003 HC RX 250 WO HCPCS: Performed by: RADIOLOGY

## 2022-07-07 PROCEDURE — APPSS15 APP SPLIT SHARED TIME 0-15 MINUTES: Performed by: NURSE PRACTITIONER

## 2022-07-07 PROCEDURE — 6370000000 HC RX 637 (ALT 250 FOR IP): Performed by: INTERNAL MEDICINE

## 2022-07-07 PROCEDURE — 77001 FLUOROGUIDE FOR VEIN DEVICE: CPT

## 2022-07-07 PROCEDURE — 6370000000 HC RX 637 (ALT 250 FOR IP): Performed by: PHYSICAL MEDICINE & REHABILITATION

## 2022-07-07 PROCEDURE — 2580000003 HC RX 258: Performed by: RADIOLOGY

## 2022-07-07 PROCEDURE — 99232 SBSQ HOSP IP/OBS MODERATE 35: CPT | Performed by: INTERNAL MEDICINE

## 2022-07-07 PROCEDURE — 76937 US GUIDE VASCULAR ACCESS: CPT | Performed by: RADIOLOGY

## 2022-07-07 PROCEDURE — 2700000000 HC OXYGEN THERAPY PER DAY

## 2022-07-07 PROCEDURE — 6360000002 HC RX W HCPCS: Performed by: INTERNAL MEDICINE

## 2022-07-07 PROCEDURE — 1210000000 HC MED SURG R&B

## 2022-07-07 PROCEDURE — 76937 US GUIDE VASCULAR ACCESS: CPT

## 2022-07-07 PROCEDURE — 6370000000 HC RX 637 (ALT 250 FOR IP): Performed by: NURSE PRACTITIONER

## 2022-07-07 PROCEDURE — 2709999900 IR TUNNELED CVC PLACE WO SQ PORT/PUMP > 5 YEARS

## 2022-07-07 PROCEDURE — 99233 SBSQ HOSP IP/OBS HIGH 50: CPT | Performed by: PSYCHIATRY & NEUROLOGY

## 2022-07-07 PROCEDURE — 99232 SBSQ HOSP IP/OBS MODERATE 35: CPT | Performed by: PHYSICAL MEDICINE & REHABILITATION

## 2022-07-07 RX ORDER — LIDOCAINE HYDROCHLORIDE 20 MG/ML
INJECTION, SOLUTION INFILTRATION; PERINEURAL
Status: COMPLETED | OUTPATIENT
Start: 2022-07-07 | End: 2022-07-07

## 2022-07-07 RX ORDER — 0.9 % SODIUM CHLORIDE 0.9 %
INTRAVENOUS SOLUTION INTRAVENOUS CONTINUOUS PRN
Status: COMPLETED | OUTPATIENT
Start: 2022-07-07 | End: 2022-07-07

## 2022-07-07 RX ORDER — HEPARIN SODIUM 1000 [USP'U]/ML
INJECTION, SOLUTION INTRAVENOUS; SUBCUTANEOUS
Status: COMPLETED | OUTPATIENT
Start: 2022-07-07 | End: 2022-07-07

## 2022-07-07 RX ADMIN — INSULIN GLARGINE 35 UNITS: 100 INJECTION, SOLUTION SUBCUTANEOUS at 20:30

## 2022-07-07 RX ADMIN — PROCHLORPERAZINE EDISYLATE 10 MG: 5 INJECTION INTRAMUSCULAR; INTRAVENOUS at 17:34

## 2022-07-07 RX ADMIN — LIDOCAINE HYDROCHLORIDE 20 ML: 20 INJECTION, SOLUTION INFILTRATION; PERINEURAL at 16:53

## 2022-07-07 RX ADMIN — ACETAMINOPHEN 325MG 650 MG: 325 TABLET ORAL at 20:21

## 2022-07-07 RX ADMIN — SERTRALINE HYDROCHLORIDE 50 MG: 50 TABLET ORAL at 20:22

## 2022-07-07 RX ADMIN — MICONAZOLE NITRATE: 2 POWDER TOPICAL at 20:22

## 2022-07-07 RX ADMIN — Medication 400 MG: at 10:32

## 2022-07-07 RX ADMIN — ARIPIPRAZOLE 5 MG: 5 TABLET ORAL at 10:32

## 2022-07-07 RX ADMIN — Medication 10 MG: at 20:21

## 2022-07-07 RX ADMIN — ATORVASTATIN CALCIUM 40 MG: 40 TABLET, FILM COATED ORAL at 20:21

## 2022-07-07 RX ADMIN — SODIUM CHLORIDE 500 ML: 9 INJECTION, SOLUTION INTRAVENOUS at 16:31

## 2022-07-07 RX ADMIN — Medication: at 20:24

## 2022-07-07 RX ADMIN — ISOSORBIDE MONONITRATE 120 MG: 60 TABLET, EXTENDED RELEASE ORAL at 10:32

## 2022-07-07 RX ADMIN — ASPIRIN 81 MG: 81 TABLET, COATED ORAL at 10:32

## 2022-07-07 RX ADMIN — INSULIN LISPRO 1 UNITS: 100 INJECTION, SOLUTION INTRAVENOUS; SUBCUTANEOUS at 20:23

## 2022-07-07 RX ADMIN — HEPARIN SODIUM 4600 UNITS: 1000 INJECTION INTRAVENOUS; SUBCUTANEOUS at 16:43

## 2022-07-07 ASSESSMENT — PAIN DESCRIPTION - LOCATION: LOCATION: BACK

## 2022-07-07 ASSESSMENT — ENCOUNTER SYMPTOMS
ABDOMINAL PAIN: 0
BACK PAIN: 0
COUGH: 0
PHOTOPHOBIA: 0
COLOR CHANGE: 0
WHEEZING: 0
GASTROINTESTINAL NEGATIVE: 1
VOMITING: 0
TROUBLE SWALLOWING: 0
CHOKING: 0
EYES NEGATIVE: 1
CONSTIPATION: 0
DIARRHEA: 0
SHORTNESS OF BREATH: 0
NAUSEA: 0
RESPIRATORY NEGATIVE: 1
BACK PAIN: 1

## 2022-07-07 ASSESSMENT — PAIN DESCRIPTION - ORIENTATION: ORIENTATION: MID

## 2022-07-07 ASSESSMENT — PAIN DESCRIPTION - DESCRIPTORS: DESCRIPTORS: ACHING

## 2022-07-07 ASSESSMENT — PAIN SCALES - GENERAL: PAINLEVEL_OUTOF10: 3

## 2022-07-07 NOTE — OR NURSING
NO SEDATION    1615 Patient assisted to IR table in supine position. Consent verified for Tunneled HD Permcath insertion. Patient placed onto the monitor, VSS.      1620  Bilateral neck vessels assessed for patency using US guidance. Site approved by Dr Momo Marmolejo.       1625  timeout performed for left tunneled dialysis catheter. Left neck IJ area cleansed generously with Chloroprep and full body drape applied. 36 Dr Momo Marmolejo administered 2% lidocaine to left IJ site with US guidance. 1632 5 fr MP set used to access left IJ site with US guidance. 1635 guidewire inserted through introducer. 1636  Symetrex long term dilators 12 F and 14 F used prior to 12 F introducer sheath inserted. Additional 2% lidocaine given along tunneled area. 1638 Symetrex  15.5 F by 23 cm HD catheter ( Lot #  [id number]  Exp  08/11/2026)  tunneled and inserted through sheath. 1639 peel away sheath and wire removed. both blue and red ports aspirate and inject appropriately per Dr Momo Marmolejo.    1641 2.3 cc of Heparin instilled into red and blue ports. Slight manual pressure held to catheter and insertion sites. 1644 prolene to catheter site. 1646  re port given to [facility]. 1700 IJ site dressed with dermabond skin glue. Catheter site dressed with Biopatch gauze and Tegaderm. 1702 both sites without bleeding at this time. Pt tolerated procedure well. Small amount of ectopy noted on monitor, Dr Momo Marmolejo was made aware. VSS.    1705 pt. was given support and reassurance throughout procedure. pt assisted off table onto bed.

## 2022-07-07 NOTE — PROGRESS NOTES
Physical Therapy Med Surg Daily Treatment Note  Facility/Department: [facility]  Room: X613/A018-65       NAME: Maritza Vaughn  : 1960 (57 y.o.)  MRN: [id number]  CODE STATUS: Full Code    Date of Service: 2022    Patient Diagnosis(es): Weakness [R53.1]  Physical deconditioning [R53.81]  Bilateral pleural effusion [J90]  At high risk for injury related to fall [Z91.81]  Concussion without loss of consciousness, initial encounter [S06.0X0A]  Fall from standing, initial encounter [W19. XXXA]  Chronic renal failure, stage 5 (HCC) [N18.5]  Closed fracture of multiple ribs of right side, initial encounter [S22.41XA]  Class 1 obesity due to excess calories with serious comorbidity and body mass index (BMI) of 33.0 to 33.9 in adult [E66.09, Z68.33]  MRSA bacteremia [R78.81, B95.62]   Chief Complaint   Patient presents with    Back Pain     since fall last night from standing     Patient Active Problem List    Diagnosis Date Noted    Unstable angina ([address] 2022    Chest pain     DJD (degenerative joint disease), cervical 2022    Closed head injury     MRSA bacteremia 2022    Sepsis due to Enterococcus ([address]     Local infection due to central venous catheter     Impaired mobility and activities of daily living 2022    Closed T11 fracture ([address] 2022    Encephalopathy acute 2022    Unspecified open wound of right upper arm, initial encounter 2022    Hyperkalemia 2022    CKD (chronic kidney disease) stage 4, GFR 15-29 ml/min ([address] 2022    Dialysis patient ([address] 2022    Multiple closed fractures of right lower extremity and ribs 2022    NSTEMI (non-ST elevated myocardial infarction) [facility] 2022    Anginal chest pain at rest [facility]) 2022    Hemodialysis-associated hypotension 10/22/2021    Chronic pain of both shoulders 2021    Nonrheumatic mitral valve regurgitation 2021    Nonrheumatic tricuspid valve regurgitation 07/07/2021    Need for extended care facility 07/07/2021    Multiple closed fractures of ribs of right side 04/05/2021    Depression 02/26/2021    Chronic obstructive pulmonary disease ([address] 02/26/2021    Critical illness polyneuropathy ([address] 02/26/2021    Compression fracture of spine ([address] 02/19/2021    Closed rib fracture 02/19/2021    Chest wall contusion, left, initial encounter 02/18/2021    Falls frequently 01/19/2021    Post PTCA     Headache, unspecified 01/03/2021    COVID-19 12/26/2020    Moderate persistent asthma without complication 72/49/2470    Weakness 08/05/2020    Difficulty in walking 07/27/2020    Atherosclerotic heart disease of native coronary artery with unspecified angina pectoris ([address] 07/03/2020    Essential (primary) hypertension 07/03/2020    Pain, unspecified 07/03/2020    ESRD (end stage renal disease) on dialysis ([address] 07/03/2020    Other seizures ([address] 07/03/2020    Paranoid schizophrenia ([address] 07/03/2020    Renal failure 06/28/2020    Recurrent falls 06/16/2020    Edema 12/23/2019    Closed supracondylar fracture of right humerus 12/23/2019    Other chronic pain 12/23/2019    Palliative care patient 12/23/2019    Class 2 severe obesity with serious comorbidity and body mass index (BMI) of 36.0 to 36.9 in adult [facility]) 12/03/2019    Sleep apnea, unspecified 11/05/2019    Pulmonary hypertension, unspecified ([address] 11/05/2019    Chronic diastolic congestive heart failure ([address] 10/04/2019    Shortness of breath 10/02/2019    Tardive dyskinesia 05/16/2019    Angina, class II ([address]     Controlled type 2 diabetes mellitus with diabetic neuropathy, with long-term current use of insulin ([address] 02/24/2017    Other specified diabetes mellitus with diabetic neuropathy, unspecified ([address] 08/04/2016    Stented coronary artery-plan is to stay on Plavix indefinately per Dr Paramjit Urban 06/02/2016    History of seizures     Urinary incontinence due to cognitive impairment     History of type C viral hepatitis     Diabetic nephropathy with proteinuria (HCC)     Incontinence of urine 04/07/2014    Vitamin D insufficiency 02/04/2014    Vitamin B 12 deficiency 06/05/2013    Cerebral microvascular disease 06/05/2013    Hemiparesis, left ([address] 01/01/2013    Schizophrenia, paranoid, chronic     Metabolic syndrome     Mixed hyperlipidemia 12/09/2010    Other hammer toe (acquired) 12/13/2007        Past Medical History:   Diagnosis Date    Angina at rest [facility]) 5/24/2020    Anxiety     CAD S/P percutaneous coronary angioplasty 2015, 2018    stents per dr Blane Godinez    CHF (congestive heart failure) ([address]     CKD (chronic kidney disease) stage 4, GFR 15-29 ml/min ([address] 2/24/2018    CKD stage 4 due to type 2 diabetes mellitus ([address]     Contusion of right chest wall 2/16/2021    COPD (chronic obstructive pulmonary disease) ([address]     Diabetic nephropathy with proteinuria ([address] 2014    DJD (degenerative joint disease) of knee     Dr Cecille Biggs GERD (gastroesophageal reflux disease)     Hemiparesis, left ([address] 2013    [facility]    Hemodialysis patient [facility])     Hemodialysis-associated hypotension 10/22/2021    History of heart failure     History of seizures     History of type C viral hepatitis     HTN (hypertension)     Hyperlipidemia     Impaired mobility and activities of daily living     Mediastinal lymphadenopathy 2013    Alphonso Hernández    Metabolic syndrome     Moderate persistent asthma without complication 8/53/5522    Need for extended care facility 7/7/2021    Neurogenic urinary incontinence 2013    Neuropathy in diabetes [facility])     Nonrheumatic mitral valve regurgitation 7/7/2021    Nonrheumatic tricuspid valve regurgitation 7/7/2021    Obesity (BMI 30-39. 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 Anival Hudson      x1 Dr. Larry Queen, Dr Rene Fofana 2018   [address]  08/10/2021    [facility]    DIAGNOSTIC CARDIAC CATH LAB PROCEDURE  10/02/2019    DIALYSIS CATHETER INSERTION Left 2022    Tunneled Symetrex 15.5F x 23cm hemodialysis catheter inserted by Dr. Virgil Bryan, TOTAL ABDOMINAL (CERVIX REMOVED)      one ovary intact, Dr Lupe Moss, menorrhagia    IR THROMBECTOMY PERCUT AVF  2022    IR THROMBECTOMY PERCUT AVF 2022 [facility] PROCEDURE    IR TUNNELED CATHETER PLACEMENT GREATER THAN 5 YEARS  6/3/2022    IR TUNNELED CATHETER PLACEMENT GREATER THAN 5 YEARS 6/3/2022 [facility] PROCEDURE    AL TOTAL KNEE ARTHROPLASTY Left 2018    LEFT KNEE TOTAL KNEE ARTHROPLASTY, SHAYNA, NERVE BLOCK performed by Shannon Nyaak MD at [address] [place] [address] PTCA      TOE AMPUTATION Right     TOTAL KNEE ARTHROPLASTY  2016    Dr Dandy Acosta TUNNELED [address] Right 2020    tunneled HD catheter per Dr Ozzy Fernandez: Yes  Patient assessed for rehabilitation services?: Yes  Family / Caregiver Present: No    Restrictions:  Restrictions/Precautions: Fall Risk  Position Activity Restriction  Other position/activity restrictions: L Rib fractures - 10 and 11 per patient    SUBJECTIVE:   Subjective: Patient resting in bed. Agreeable to tx. Response To Previous Treatment: Patient with no complaints from previous session. Pain  Denies     OBJECTIVE:     Bed mobility  Supine to Sit: Stand by assistance  Sit to Supine: Stand by assistance  Scooting: Stand by assistance  Bed Mobility Comments: Requires increased time and effort to complete. Good sequencing. Utilizes bed rail. Transfers  Sit to Stand: Stand by assistance  Stand to sit: Stand by assistance  Comment: Requires increased time to stabilize self upon standing    Ambulation  Surface: level tile  Device: Rollator  Assistance: Minimal assistance  Quality of Gait: gait pattern varies- step through to reciprocal gait, antalgia, decreased management and control of rollator, min A to stabilize  Distance: 25 feet     ASSESSMENT   Assessment: Patient requires increased time and effort to complete mobility. Demonstrates decreased safety and management of rollator. Moderate antalgia noted during gait training. Patient fatigued quickly. Discharge Recommendations:  Continue to assess pending progress  Goals  Short Term Goals  Time Frame for Short term goals: 2-4 weeks  Short term goal 1: Indep HEP for symptom mgmt  Short term goal 2: Indep bed mobility  Short term goal 3: Indep transfers bed to chair with assistive device  Short term goal 4: Ambulate with rollator Indep /=50' to allow safe return home. Patient Goals   Patient goals : Improve function    PLAN    Plan: 1 time a day 3-6 times a week  Safety Devices  Type of Devices:  All fall risk precautions in place,Call light within reach,Left in bed,Bed alarm in place     AMPAC (6 CLICK) BASIC MOBILITY  AM-PAC Inpatient Mobility Raw Score : 17     Therapy Time   Individual   Time In 1350   Time Out 1405   Minutes 15     Timed Code Treatment Minutes: 15 Minutes  Gt 10  Tr 5   Rocco zuniga PTA, 07/07/22 at 2:28 PM         Definitions for assistance levels  Independent = pt does not require any physical supervision or assistance from another person for activity completion. Device may be needed.   Stand by assistance = pt requires verbal cues or instructions from another person, close to but not touching, to perform the activity  Minimal assistance= pt performs 75% or more of the activity; assistance is required to complete the activity  Moderate assistance= pt performs 50% of the activity; assistance is required to complete the activity  Maximal assistance = pt performs 25% of the activity; assistance is required to complete the activity  Dependent = pt requires total physical assistance to accomplish the task

## 2022-07-07 NOTE — PROGRESS NOTES
Subjective: The patient complains of severe acute on chronic progressive fatigue and lower extremity pain especially in her left knee due to neuropathy and osteoarthritis partially relieved by rest, PT, OT and meds Tylenol and exacerbated by exertion and recent illness-MRSA septicemia. Her Vas-Cath was removed as it was felt to be possibly the source of infection. Her right upper extremity fistula finally became mature but its not working she needs dialysis today and therefore we will consult interventional radiology to place a new port. A big issue consult of concern is probable central canal stenosis in her C-spine with myelopathy causing her weakness. I am concerned about patients medical complexities including:  Principal Problem:    MRSA bacteremia  Active Problems:    Impaired mobility and activities of daily living    Dialysis patient [facility])    Multiple closed fractures of right lower extremity and ribs    Closed T11 fracture ([facility] [address]    Encephalopathy acute    Sepsis due to Enterococcus ([facility] [address]    Local infection due to central venous catheter    DJD (degenerative joint disease), cervical    Closed head injury    Chronic diastolic congestive heart failure (HCC)    Closed rib fracture  Resolved Problems:    * No resolved hospital problems. *      .    Reviewed recent nursing note and discussed current status and planned care with acute care providers, \" UNABLE TO COMPLETE DIALYSIS TODAY DUE TO CLOTTED GRAFT. PT IN NO EVIDENCE OF DISTRESS. RN AND NEPHROLOGY NOTIFIED. \"    She had an x-ray of her left knee because of severe left knee pain of the tibial plateau it is unremarkable for fracture. Patient admits to severe left knee pain and tenderness. She would like to trial tramadol and possibly consider an x-ray. ROS x10: The patient also complains of severely impaired mobility and activities of daily living.   Otherwise no new problems with vision, hearing, nose, mouth, throat, dermal, cardiovascular, GI, , pulmonary, musculoskeletal, psychiatric or neurological.        Vital signs:  BP (!) 142/59   Pulse 82   Temp 97.7 °F (36.5 °C) (Oral)   Resp 16   Ht 5' 7\" (1.702 m)   Wt 201 lb 1 oz (91.2 kg)   LMP  (LMP Unknown)   SpO2 95%   BMI 31.49 kg/m²   I/O:   PO/Intake:    fair PO intake, monitoring for dysphagia    Bowel/Bladder:   continent,    General:  Patient is well developed, adequately nourished, and    well kempt. HEENT:    PERRLA, hearing intact to loud voice, external inspection of ear and nose benign. Inspection of lips, tongue and gums benign  Musculoskeletal: No significant change in strength or tone. All joints stable. Inspection and palpation of digits and nails show no clubbing, cyanosis or inflammatory conditions. Neuro/Psychiatric: Affect: flat-  Alert and oriented to self and situation with min cues. No significant change in deep tendon reflexes or sensation  Lungs:  Diminished, CTA-B  . Respiration effort is normal at rest.   Heart:   S1 = S2,   RRR. Abdomen:  Soft, non-tender    Extremities:  Trace  lower extremity edema but no unusual tenderness. Arthritic pain left knee-tender at the tibial plateau. Skin:   BUE bruises dt blood draws-PVD discoloration bilateral lower extremities      Rehabilitation:  Physical Therapy:   Bed mobility:  Bed mobility  Supine to Sit: Contact guard assistance (07/02/22 1223)  Sit to Supine: Minimal assistance (07/02/22 1223)  Bed Mobility Comments: DNT pt in chair before and after tx. (07/06/22 1019)  Transfers:  Transfers  Sit to Stand: Stand by assistance (07/06/22 1019)  Stand to sit: Stand by assistance (07/06/22 1019)  Bed to Chair: Contact guard assistance (07/02/22 1224)  Comment: vc's for proper rollator brake management, good follow through.  (07/06/22 1019)  Gait:   Ambulation  Surface: level tile (07/06/22 1021)  Device: Rollator (07/06/22 1021)  Assistance: Minimal assistance (07/06/22 1021)  Quality of Gait: poor rollator approximation, instability noted during retro steps. (07/06/22 1021)  Gait Deviations: Slow Tere (07/06/22 1021)  Distance: 5' FWD/BWD x2 (07/06/22 1021)  Comments: pt fatigued with this distance declines to ambulate further. (07/06/22 1021)  Stairs:  Stairs/Curb  Stairs?: No (07/02/22 1224)  W/C mobility:       Occupational Therapy:      ADL  Feeding: Setup (07/03/22 1211)  Grooming: Minimal assistance (07/03/22 1211)  UE Dressing: Minimal assistance (07/03/22 1211)  LE Dressing: Minimal assistance (07/06/22 1121)  LE Dressing Skilled Clinical Factors: pants and socks (07/06/22 1121)  Toileting: Dependent/Total (07/03/22 1211)  Toileting Skilled Clinical Factors: reports significant difficulty with hygiene/clothing management (07/03/22 1211)  Additional Comments: Simulated ADLs. Pt complaining of fatigue with activity. Pt reports unable to assist with clothing over feet. Pt on purewick upon entering room but asked OT to remove it due to discomfort (07/03/22 1211)             Speech Therapy:            Diet/Swallow:        Dysphagia Outcome Severity Scale: Level 4: Mild moderate dysphagia- Intermittent supervision/cueing.  One - two diet consistencies restricted  Compensatory Swallowing Strategies : Eat/Feed slowly,Upright as possible for all oral intake,Small bites/sips  Therapeutic Interventions: Patient/Family education,Oral motor exercises,Diet tolerance monitoring    COGNITION  OT:    SP:           Lab/X-ray studies reviewed, analyzed and discussed with patient and staff:     XR KNEE LEFT (3 VIEWS) : 7/6/2022       CLINICAL HISTORY:  knee pain post fall .       COMPARISON: 1/4/2019       TECHNIQUE: AP, lateral, internal and external oblique radiographs of the left knee were obtained.           FINDINGS:        A left total knee arthroplasty appears unremarkable.       There is no fracture, dislocation, significant joint effusion, evidence hardware loosening, worrisome bone destruction, or other significant changes from 1/4/2019 identified.                     Impression       NO DISPLACED FRACTURE OR SIGNIFICANT POSTTRAUMATIC COMPLICATION IDENTIFIED. L1-L2: No significant disc bulge, spinal canal or neuroforaminal stenosis.        L2-L3: Small disc bulge. No neuroforaminal or spinal canal stenosis.       L3-L4: Small disc bulge. Mild facet arthropathy. Ligamentum thickening. No neuroforaminal or spinal canal stenosis.       L4-L5: Small disc bulge. Mild facet arthropathy. Ligament thickening. Mild spinal canal stenosis. No neuroforaminal stenosis.       L5-S1: Small disc bulge. Mild facet arthropathy. No neuroforaminal or spinal canal stenosis.           MODERATELY EXTENSIVE CERVICAL SPONDYLOSIS, AS DESCRIBED IN DETAIL.       MODERATE TO MARKED CENTRAL SPINAL STENOSIS AND MODERATE NEURAL FORAMINAL NARROWING FROM C4-5 THROUGH C6-C7 LEVELS.       MODERATE CENTRAL SPINAL STENOSIS AND MILD NEURAL FORAMINAL NARROWING AT C3-4.       MILD TO MODERATE LEFT NEURAL FORAMINAL NARROWING C7-T1 AND T1-2.          Recent Results (from the past 24 hour(s))   POCT Glucose    Collection Time: 07/06/22 11:44 AM   Result Value Ref Range    POC Glucose 122 (H) 70 - 99 mg/dl    Performed on ACCU-CHEK    POCT Glucose    Collection Time: 07/06/22  5:54 PM   Result Value Ref Range    POC Glucose 161 (H) 70 - 99 mg/dl    Performed on ACCU-CHEK    POCT Glucose    Collection Time: 07/06/22  8:52 PM   Result Value Ref Range    POC Glucose 161 (H) 70 - 99 mg/dl    Performed on ACCU-CHEK    CBC    Collection Time: 07/07/22  5:47 AM   Result Value Ref Range    WBC 9.0 4.8 - 10.8 K/uL    RBC 2.92 (L) 4.20 - 5.40 M/uL    Hemoglobin 9.6 (L) 12.0 - 16.0 g/dL    Hematocrit 26.6 (L) 37.0 - 47.0 %    MCV 91.0 82.0 - 100.0 fL    MCH 32.8 (H) 27.0 - 31.3 pg    MCHC 36.0 33.0 - 37.0 %    RDW 14.3 11.5 - 14.5 %    Platelets 015 235 - 651 K/uL   Renal Function Panel    Collection Time: 07/07/22  5:47 AM   Result Value Ref Range    Sodium 134 (L) 135 - 144 mEq/L    Potassium 4.4 3.4 - 4.9 mEq/L    Chloride 96 95 - 107 mEq/L    CO2 27 20 - 31 mEq/L    Anion Gap 11 9 - 15 mEq/L    Glucose 66 (L) 70 - 99 mg/dL    BUN 24 (H) 8 - 23 mg/dL    CREATININE 3.70 (H) 0.50 - 0.90 mg/dL    GFR Non-African American 12.3 (L) >60    GFR  14.9 (L) >60    Calcium 9.6 8.5 - 9.9 mg/dL    Phosphorus 3.0 2.3 - 4.8 mg/dL    Albumin 3.6 3.5 - 4.6 g/dL   Magnesium    Collection Time: 07/07/22  5:47 AM   Result Value Ref Range    Magnesium 2.2 1.7 - 2.4 mg/dL   Vancomycin Level, Random    Collection Time: 07/07/22  5:47 AM   Result Value Ref Range    Vancomycin Rm 13.3 10.0 - 40.0 ug/mL     Previous extensive, complex labs, notes and diagnostics reviewed and analyzed. ALLERGIES:    Allergies as of 06/30/2022 - Fully Reviewed 06/30/2022   Allergen Reaction Noted    Codeine Hives 12/09/2011    Oxycontin [oxycodone hcl] Hives 06/15/2020      (please also verify by checking [facility])     Complex Physical Medicine & Rehab Issues Assess & Plan:   1. Severe abnormality of gait and mobility and impaired self-care and ADL's secondary to   MRSA septicemia. Updated functional and medical status reassessed regarding patients ability to participate in therapies and patient found to be able to participate in:        acute intensive comprehensive inpatient rehabilitation program including PT/OT to improve balance, ambulation, ADLs, and to improve the P/AROM. It is my opinion that they will be able to tolerate 3 hours of therapy a day and benefit from it at an acute level. I again discussed acute rehab with the patient and verify that the patient is able and willing to participate in 3 hours of therapy a day. Rehab and Acute Care Case Management has also reinforced this expectation.   Will continue to follow to attempt to get patient to the most efficient but most effective level of care will be in their best interest.  Continue to focus on energy conservation heart rate and blood pressure monitoring before during and after therapy endurance and consistency of function. 2. Bowel constipation   and Bladder dysfunction   overactive, neurogenic bladder:  frequent toileting, ambulate to bathroom with assistance, check post void residuals. Check for C.difficile x1 if >2 loose stools in 24 hours, continue bowel & bladder program.  Monitor for UTI symptoms including lethargy and confusion    3. Severe spinal stenosis and left knee pain secondary to osteoarthritis and generalized OA pain: reassess pain every shift and prior to and after each therapy session, give prn Tylenol  and consider scheduled Tylenol, modalities prn in therapy, consider Lidoderm, K-pad prn. Patient has rare use of  Ultram at home with Lyrica. Resume Ultram if okay with medical.   left knee x-ray-was reviewed and unremarkable for fracture however cervical spine films show severe cervical spinal stenosis as detailed below and above. 4. Skin breakdown   risk:  continue pressure relief program.  Daily skin exams and reports from nursing. 5. Severe fatigue due to immobility and nutritional deficits: monitoring for dysphagia   Add vitamin B12 vitamin D and CoQ10 titrate dosing and add protein supplementation with low carb content. 6. Complex discharge planning:   Discussed with care team-last 24 hour events noted. I will continue to follow along and reassess functional and medical status as we strive to improve patient's functional and medical outcomes progressing to the most efficient and lowest level of care. Complex Active General Medical Issues that complicate care:     1.  Principal Problem:    MRSA bacteremia  Active Problems:    Impaired mobility and activities of daily living    Dialysis patient [facility])    Multiple closed fractures of right lower extremity and ribs    Closed T11 fracture ([facility] [address]    Encephalopathy acute    Sepsis due to Enterococcus ([facility] [address]    Local infection due to central venous catheter

## 2022-07-07 NOTE — PROGRESS NOTES
[facility] Pharmacokinetic Monitoring Service - Vancomycin    Consulting Provider: Dr Evelyn Alonzo  Indication: bacteremia  Target Concentration: Pre-Dialysis Concentration 15-20 mg/L  Day of Therapy: 7  Additional Antimicrobials: none    Pertinent Laboratory Values: Wt Readings from Last 1 Encounters:   07/07/22 201 lb 1 oz (91.2 kg)     Temp Readings from Last 1 Encounters:   07/07/22 97.7 °F (36.5 °C) (Oral)     Recent Labs     07/05/22  0550 07/06/22  0557 07/06/22  0558 07/07/22  0547   CREATININE  --  3.03*  --  3.70*   WBC   < >  --  6.5 9.0    < > = values in this interval not displayed. Pertinent Cultures:  Culture Date Source Results   07/04/22 blood enterococcus   MRSA Nasal Swab: N/A. Non-respiratory infection.     Recent vancomycin administrations                     vancomycin 1000 mg IVPB in 250 mL D5W addavial (mg) 1,000 mg New Bag 07/05/22 1115                    Assessment:  Date/Time Current Dose Concentration Dialysis Session   07/07/22 1000 mg post HD sessions 13.2 7/08/22 ordered     Plan:  Concentration-guided dosing due to renal impairment and intermittent hemodialysis         Current dosing regimen of 1000 mg post-HD is sub-therapeutic   Increase dose to vancomycin 1250 mg post-HD  Pharmacy will continue to monitor patient and adjust therapy as indicated    Thank you for the [address] [facility]  7/7/2022 6:23 PM

## 2022-07-07 NOTE — PROGRESS NOTES
Neurology Follow up    SUBJECTIVE: Patient seen and examined for neurology follow-up for bilateral lower extremity weakness and falls. Complains of back pain and weakness in the lower extremities. Patient has MRSA bacteremia. Afebrile. NOELLE was negative. Complaining of left knee pain. still continues to have lower extremity weakness. She is able to transfer but not quite able to walk much.   Strength still remains 4/5  No symptoms except for weakness reported      Current Facility-Administered Medications   Medication Dose Route Frequency Provider Last Rate Last Admin    lidocaine 4 % external patch 3 patch  3 patch TransDERmal Daily Jennifer Joyce DO   3 patch at 07/07/22 1033    camphor-menthol-methyl salicylate (BENGAY ULTRA STRENGTH) 4-10-30 % cream   Apply externally TID PRN Anthony Alonzo DO   Given at 07/06/22 2100    polyethylene glycol (GLYCOLAX) packet 17 g  17 g Oral Daily Adelaida Rouse-Roche, APRN - CNP   17 g at 07/04/22 0823    insulin lispro (HUMALOG) injection vial 0-12 Units  0-12 Units SubCUTAneous TID WC Adelaida Rouse-Roche APRN - CNP   2 Units at 07/04/22 1716    insulin lispro (HUMALOG) injection vial 0-6 Units  0-6 Units SubCUTAneous Nightly Adelaida Bolzan-Roche, APRN - CNP   1 Units at 07/06/22 2055    heparin (porcine) injection 2,000 Units  2,000 Units IntraVENous Once Tiarra [place], DO        epoetin chadwick-epbx (RETACRIT) injection 10,000 Units  10,000 Units SubCUTAneous Once Tiarra [place], DO        vancomycin (VANCOCIN) intermittent dosing (placeholder)   Other RX Placeholder Jona Ragini, DO        melatonin tablet 3 mg  3 mg Oral Nightly PRN Jona Ragini, DO        acetaminophen (TYLENOL) tablet 650 mg  650 mg Oral Q6H PRN Jona Ragini, DO   650 mg at 07/06/22 2057    Or    acetaminophen (TYLENOL) suppository 650 mg  650 mg Rectal Q6H PRN Jona Ragini, DO   650 mg at 07/01/22 1354    prochlorperazine (COMPAZINE) injection 10 mg  10 mg IntraVENous TID DEEPIKAN Roxy BECERRA Dragan Hicks, DO   10 mg at 07/06/22 2117    albuterol (PROVENTIL) nebulizer solution 2.5 mg  2.5 mg Nebulization Q4H PRN Sophia Poweller, DO        ARIPiprazole (ABILIFY) tablet 5 mg  5 mg Oral Daily Sophia Ree, DO   5 mg at 07/07/22 1032    aspirin EC tablet 81 mg  81 mg Oral Daily Sophia Poweller, DO   81 mg at 07/07/22 1032    atorvastatin (LIPITOR) tablet 40 mg  40 mg Oral Nightly Sophia Ree, DO   40 mg at 07/06/22 2058    hydrOXYzine HCl (ATARAX) tablet 25 mg  25 mg Oral TID PRN Sophia Poweller, DO        insulin glargine (LANTUS) injection vial 35 Units  35 Units SubCUTAneous Nightly Sophia Keenan, DO   35 Units at 07/06/22 2055    isosorbide mononitrate (IMDUR) extended release tablet 120 mg  120 mg Oral Daily Sophia Poweller, DO   120 mg at 07/07/22 1032    magnesium oxide (MAG-OX) tablet 400 mg  400 mg Oral Daily Sophia Poweller, DO   400 mg at 07/07/22 1032    melatonin disintegrating tablet 10 mg  10 mg Oral Nightly Sophia Ree, DO   10 mg at 07/06/22 2057    midodrine (PROAMATINE) tablet 5 mg  5 mg Oral Once per day on Mon Wed Fri Roxy Hendrix, DO   5 mg at 07/06/22 8466    miconazole (MICOTIN) 2 % powder   Topical BID Sophia Keenan, DO   Given at 07/06/22 2103    sertraline (ZOLOFT) tablet 50 mg  50 mg Oral Nightly Sophia Poweller, DO   50 mg at 07/06/22 2058    glucose chewable tablet 16 g  4 tablet Oral PRN Sophia Poweller, DO        dextrose bolus 10% 125 mL  125 mL IntraVENous PRN Sophia Poweller, DO        Or    dextrose bolus 10% 250 mL  250 mL IntraVENous PRN Sophia Poweller, DO        glucagon (rDNA) injection 1 mg  1 mg IntraMUSCular PRN Sophia Poweller, DO        dextrose 5 % solution  100 mL/hr IntraVENous PRN Sophia Keenan, DO           PHYSICAL EXAM:    BP (!) 142/59   Pulse 82   Temp 97.7 °F (36.5 °C) (Oral)   Resp 16   Ht 5' 7\" (1.702 m)   Wt 201 lb 1 oz (91.2 kg)   LMP  (LMP Unknown)   SpO2 95%   BMI 31.49 kg/m²    General Appearance:      Skin:  normal  CVS - Normal sounds, No murmurs , No carotid Bruits  RS -CTA  Abdomen Soft, BS present  Review of Systems   Constitutional: Negative for fever. HENT: Negative for ear pain, tinnitus and trouble swallowing. Eyes: Negative for photophobia and visual disturbance. Respiratory: Negative for choking and shortness of breath. Cardiovascular: Negative for chest pain and palpitations. Gastrointestinal: Negative for nausea and vomiting. Musculoskeletal: Positive for arthralgias, back pain, gait problem and myalgias. Negative for joint swelling, neck pain and neck stiffness. Skin: Negative for color change. Allergic/Immunologic: Negative for food allergies. Neurological: Positive for weakness. Negative for dizziness, tremors, seizures, syncope, facial asymmetry, speech difficulty, light-headedness, numbness and headaches. Psychiatric/Behavioral: Negative for behavioral problems, confusion, hallucinations and sleep disturbance.    Exam remains mostly nonfocal  Mental Status Exam:             Level of Alertness:   awake            Orientation:   person, place, time                    Attention/Concentration:  normal            Language:  normal      Funduscopic Exam:     Cranial Nerves            Cranial nerve III           Pupils:  equal, round, reactive to light      Cranial nerves III, IV, VI           Extraocular Movements: intact      Cranial nerve V           Facial sensation:  intact      Cranial nerve VII           Facial strength: intact      Cranial nerve VIII           Hearing:  intact      Cranial nerve IX           Palate:  intact      Cranial nerve XI         Shoulder shrug:  intact      Cranial nerve XII          Tongue movement:  normal    Motor: No extremity strength of 4/ 5 with areflexia            Sensory:        Pinprick             Right Upper Extremity:  normal             Left Upper Extremity:  normal             Right Lower Extremity:  normal             Left Lower Extremity:  normal           Vibration Touch            Proprioception                 Coordination:           Finger/Nose   Right:  normal              Left:  normal          Heel-Knee-Shin                Right:  normal              Left:  normal          Rapid Alternating Movements              Right:  normal              Left:  normal          Gait:                       Casual: proximal muscle weakness is notable upon standing reflexes:             Deep Tendon Reflexes:             Reflexes are 2 +             Plantar response:                Right:  downgoing     Patient very much unchanged   patient still transferring and not ambulating       left:  downgoing    Vascular:  Cardiac Exam:  normal         FL MODIFIED BARIUM SWALLOW W VIDEO    Result Date: 7/1/2022  FL MODIFIED BARIUM SWALLOW W VIDEO : 7/1/2022 CLINICAL HISTORY:  Choking with meal, concern for aspiration/dysphagia . COMPARISON: 5/10/2013. TECHNIQUE: Lateral videofluoroscopy was provided during speech therapy evaluation during ingestion of various barium liquids and semisolids. A total of 1.6 minutes of fluoroscopy time was used, with a total of  10 fluoroscopic series and one still saved. No diagnostic images were saved. Oral contrast:  50 mL BaSO4 FINDINGS: Mild to moderate oral dysphagia is observed with premature entry into the piriform sinuses and mild to moderate oral residual that cleared with independent re-swallowing. Endovaginal laryngeal penetration was observed with thin and soft barium consistencies that immediately cleared. Mild to moderate residual within the vallecula cleared with recent swallowing. No tracheal aspiration was identified. A full report will be made by the speech pathology team.     MILD TO MODERATELY ABNORMAL MODIFIED BARIUM SWALLOW. NO TRACHEAL ASPIRATION IDENTIFIED.  A FULL REPORT WILL BE MADE BY THE SPEECH PATHOLOGY TEAM.       Recent Labs     07/05/22  0550 07/06/22  0558 07/07/22  0547   WBC 7.0 6.5 9.0   HGB 9.7* 10.5* 9.6*    196 235 Recent Labs     07/05/22  0549 07/06/22  0557 07/07/22  0547   * 139 134*   K 4.6 4.3 4.4   CL 92* 99 96   CO2 27 29 27   BUN 31* 15 24*   CREATININE 4.51* 3.03* 3.70*   GLUCOSE 147* 86 66*     No results for input(s): BILITOT, ALKPHOS, AST, ALT in the last 72 hours. Lab Results   Component Value Date/Time    PROTIME 16.3 06/30/2022 07:30 AM    INR 1.3 06/30/2022 07:30 AM     No results found for: LITHIUM, DILFRTOT, VALPROATE    ASSESSMENT AND PLAN  With ataxia with falls with lower extremity weakness. A lumbar canal stenosis must be ruled out. Recommend an MRI of the lumbosacral spine and the brain given that she has underlying bacteremia to make sure this is not endocarditis. She also has peripheral neuropathy to explain her lower extremity weakness as well had findings discussed. Exam as noted above. Patient has some ataxia and lower extremity weakness. We had recommended a lumbar spine evaluation with an MRI with an MRI I of the brain to make sure there is no septic emboli given underlying blood cultures. MRI is pending. Complain of knee pain which may be causing some degree with difficult ambulation. Exam remains unchanged with lower extremity weakness and back pain with areflexia. MRI was not done as there appeared to be some issues about stents and cardiology has been consulted for the same. Will await for the MRI prior to any other recommendations    7/5/22:   MRI of the brain negative for acute findings  MRI of the lumbar spine with degenerative changes and no significant canal stenosis. Localizer identified high-grade canal stenosis of the cervical spine. Will obtain dedicated cervical spine MRI. Patient with sepsis with Enterococcus bacteremia. Infected dialysis catheter. Continues on vancomycin. NOELLE today.     I have personally performed a face to face diagnostic evaluation on this patient, reviewed all data and investigations, and am the sole provider of all clinical decisions on the neurological status of this patient. Patient was examined and unchanged examination. MRI was reviewed and is negative for any acute bleeds. MRI of the lumbar spine did not show stenosis. On the localizer there was some question of cervical spinal stenosis and will obtain a dedicated MRI of the cervical spine. MRI reviewed in person and patient examined and within 60% time spent in evaluating and managing this patient myself. I have personally performed a face to face diagnostic evaluation on this patient, reviewed all data and investigations, and am the sole provider of all clinical decisions on the neurological status of this patient. Semination as noted above patient still continues to lower EXTR weakness but I do not see any myelopathic signs. MRI of the cervical spine is still pending. Patient has no session of septic emboli. Findings were discussed with the patient NOELLE is pending. More than 60% time spent on evaluating and managing this patient by myself      7/6/2022:  MRI of the cervical spine remains pending. NOELLE negative  Rehab eval pending    7/7/22:  Gait ataxia with falls. MRI of the cervical spine showed moderate to marked central canal stenosis from C4-5 through C6-7. We will consult neurosurgery. I have personally performed a face to face diagnostic evaluation on this patient, reviewed all data and investigations, and am the sole provider of all clinical decisions on the neurological status of this patient. It is noted and patient has significant stenosis of C5-C6-C7. This explains patient gait issues but is not hyperreflexic due to peripheral neuropathy. This was seen on the markers and therefore evaluated. Details of this are discussed with the patient and we will see if she is surgical or not. More than 60% time spent on evaluating and explaining to the patient. Darren Levy MD, [address] AveAt[facility] Board of Psychiatry & Neurology  Board Certified in Vascular Neurology  Board Certified in Neuromuscular Medicine  Certified in Neurorehabilitation

## 2022-07-07 NOTE — CONSULTS
Patient Name: Kaylee Turner Date: 2022  7:01 AM  MR #: [id number]  : 1960    Attending Physician: Autumn Reyes MD  Reason for consult: Cervical canal stenosis    History of Presenting Illness and Review of Staci Anaya is a 57 y.o. female on hospital day 5 . History Of Present Illness:  15-year-old female with treated MRSA bacteremia. Had tunneled dialysis catheter placed yesterday by Dr. Mynor Segura interventional radiology. Admitted 2022 after a fall at home with pain secondary right posterior 10 and 11 rib fractures. She has chronically elevated INR. She is on aspirin a day. Hemodialysis patient. Diabetes mellitus type 2 insulin-dependent. Failure to thrive at home. She has had a few falls unable to care for herself. More weakness and confusion. Multiple comorbidities including angina anxiety coronary artery disease percutaneous coronary angioplasty congestive heart failure chronic kidney failure on dialysis stage IV. Chronic obstructive pulmonary disease diabetic neuropathy and nephro pathology with proteinuria. Degenerative joint disease especially left knee with pain. GERD. Prior cerebral infarct with left hemiparesis. In the past has had C viral hepatitis. Has ongoing hypertension hyperlipidemia impaired mobility asthma neurogenic urinary incontinence neuropathy from her diabetes nonrheumatic mitral valve regurgitation tricuspid valve regurgitation. Suffers from recurrent urinary tract infections. History of schizophrenia paranoid chronic. Status post bilateral knee replacements. Amputation third toe the right foot. Lives at home with her daughter. Has not been able to do steps for many months and is confined to a wheeled walker.     History:      Past Medical History:   Diagnosis Date    Angina at rest [facility]) 2020    Anxiety     CAD S/P percutaneous coronary angioplasty ,     stents per dr Marco Antonio Fofana    CH[facility]congestive heart failure) [facility]     CKD (chronic kidney disease) stage 4, GFR 15-29 ml/min ([facility]) 2018    CKD stage 4 due to type 2 diabetes mellitus ([address]     Contusion of right chest wall 2021    COPD (chronic obstructive pulmonary disease) ([facility]     Diabetic nephropathy with proteinuria ([address] 2014    DJD (degenerative joint disease) of knee     Dr Jose L Juares GERD (gastroesophageal reflux disease)     Hemiparesis, left ([address] 2013    [facility] Assisted Living [facility]    Hemodialysis patient [facility])     Hemodialysis-associated hypotension 10/22/2021    History of heart failure     History of seizures     History of type C viral hepatitis     HTN (hypertension)     Hyperlipidemia     Impaired mobility and activities of daily living     Mediastinal lymphadenopathy     Daniella Aviles    Metabolic syndrome     Moderate persistent asthma without complication 3/00/3931    Need for extended care facility 2021    Neurogenic urinary incontinence 2013    Neuropathy in diabetes [facility])     Nonrheumatic mitral valve regurgitation 2021    Nonrheumatic tricuspid valve regurgitation 2021    Obesity (BMI 30-39. 9)     Recurrent UTI     S/P colonoscopy 2014    [facility], focal active colitis    Schizophrenia, paranoid, chronic ([address]     [facility]   [employer] vessel disease, cerebrovascular 2013    Status post total knee replacement, right     Status post total left knee replacement 2018   Margret Mattes 2020    Traumatic amputation of third toe of right foot ([address]     Type 2 diabetes mellitus with renal manifestations, controlled ([address] 2015    Insulin dependent, Dr Tiffanie Camacho    Uncontrolled type 2 diabetes mellitus with hyperglycemia ([address]     Urinary incontinence due to cognitive impairment 2013    Vitamin D deficiency 2014     Past Surgical History:   Procedure Laterality Date     SECTION      x1    COLONOSCOPY  2014    Dr. Page Arzola Bernardino Oliver      x1 Dr. Bessie Vega, Dr Abbasi Flank 2018   [address] [facility]  08/10/2021    [facility]    DIAGNOSTIC CARDIAC CATH LAB PROCEDURE  10/02/2019    DIALYSIS CATHETER INSERTION Left 06/03/2022    Tunneled Symetrex 15.5F x 23cm hemodialysis catheter inserted by Dr. Toni Xiong, TOTAL ABDOMINAL (CERVIX REMOVED)      one ovary intact, Dr Cervantes Child, menorrhagia    IR THROMBECTOMY PERCUT AVF  6/6/2022    IR THROMBECTOMY PERCUT AVF 6/6/2022 [facility] PROCEDURE    IR TUNNELED CATHETER PLACEMENT GREATER THAN 5 YEARS  6/3/2022    IR TUNNELED CATHETER PLACEMENT GREATER THAN 5 YEARS 6/3/2022 [facility] PROCEDURE    IA TOTAL KNEE ARTHROPLASTY Left 06/21/2018    LEFT KNEE TOTAL KNEE ARTHROPLASTY, SHAYNA, NERVE BLOCK performed by Ruben Spangler MD at [address] [place] [address] [facility]      TOE AMPUTATION Right     TOTAL KNEE ARTHROPLASTY  05/19/2016    Dr Arsalan Rouse TUN[address]ELED [address] Right 07/01/2020    tunneled HD catheter per Dr Suzanne Morales       Family History  Family History   Problem Relation Age of Onset    Cancer Mother 76        survived   Mark Lasso Breast Cancer Mother     Hypertension Father     Diabetes Sister     Mental Illness Sister     Colon Cancer Neg Hx      [] Unable to obtain due to ventilated and/ or neurologic status    Social History     Socioeconomic History    Marital status:      Spouse name: Not on file    Number of children: 2    Years of education: Not on file    Highest education level: Not on file   Occupational History    Occupation: disabled   Tobacco Use    Smoking status: Never Smoker    Smokeless tobacco: Never Used   Vaping Use    Vaping Use: Never used   Substance and Sexual Activity    Alcohol use: No     Alcohol/week: 0.0 standard drinks    Drug use: No    Sexual activity: Not Currently   Other Topics Concern    Not on file   Social History Narrative    Disabled, [place] [facility] [place] is close by     [employer] in [place], one of 5    Twin sister Breanne Hall, very ill in 2018, [place] [address]    Moved to [facility], , 2 children, one son and one daughter    Worked at [employer], as a nurse's aide    Disabled due to mental illness    Lived at [facility], was discharged, returned to independent living in 2017 in the daughter's house and has adjusted well    One son and one daughter, live in the same house with patient, Amelia Jordan pays the rent    Trippy [employer] reading ([employer])             10/11/2021 [facility] updates; patient lives with her daughter son-in-law and 2 grandchildren and patient's handicapped son. Per daughter, her brother is blind, MRDD, multiple health issues. Daughter's  is patient's legal guardian. Patient has hemodialysis Tuesday Thursday and Saturday. Patient's bedroom is on main floor with a half bath. Daughter walks patient upstairs once weekly for full bath. Patient is using her walker in the home. Patient has a hospital bed in the home. Social Determinants of Health     Financial Resource Strain: Low Risk     Difficulty of Paying Living Expenses: Not hard at all   Food Insecurity: No Food Insecurity    Worried About Running Out of Food in the Last Year: Never true    Yung of Food in the Last Year: Never true   Transportation Needs: No Transportation Needs    Lack of Transportation (Medical): No    Lack of Transportation (Non-Medical):  No   Physical Activity: Sufficiently Active    Days of Exercise per Week: 3 days    Minutes of Exercise per Session: 60 min   Stress:     Feeling of Stress : Not on file   Social Connections:     Frequency of Communication with Friends and Family: Not on file    Frequency of Social Gatherings with Friends and Family: Not on file    Attends Mandaen Services: Not on file    Active Member of Clubs or Organizations: Not on file    Attends Club or Organization Meetings: Not on file    Marital Status: Not on file   Intimate Partner Violence:     Fear of Current or Ex-Partner: Not on file    Emotionally Abused: Not on file    Physically Abused: Not on file    Sexually Abused: Not on file   Housing Stability:     Unable to Pay for Housing in the Last Year: Not on file    Number of Brenda in the Last Year: Not on file    Unstable Housing in the Last Year: Not on file      [] Unable to obtain due to ventilated and/ or neurologic status    Home Medications:      Medications Prior to Admission: ARIPiprazole (ABILIFY) 5 MG tablet, TAKE 1 TABLET BY MOUTH ONCE DAILY  nitroGLYCERIN (NITROSTAT) 0.4 MG SL tablet, up to max of 3 total doses. If no relief after 1 dose, call 911.  furosemide (LASIX) 20 MG tablet, Take 1 tablet by mouth 2 times daily (Patient taking differently: Take 100 mg by mouth 2 times daily )  NYAMYC 310969 UNIT/GM powder, APPLY TO AFFECTED AREA OF ABDOMINAL FOLDS EVERY 12 HOURS AS NEEDED  insulin aspart (NOVOLOG FLEXPEN) 100 UNIT/ML injection pen, 15 UNITS PLUS SLIDING SCALE  LESS THAN 180 NONE 181-250 2 UNITS 251-300 4 UNITS 301-400 6 UNITS  401-500 10 UNITS  isosorbide mononitrate (IMDUR) 30 MG extended release tablet, Take 4 tablets by mouth daily  atorvastatin (LIPITOR) 40 MG tablet, TAKE 1 TABLET BY MOUTH DAILY (Patient taking differently: Take 40 mg by mouth nightly )  magnesium oxide (MAG-OX) 400 MG tablet, TAKE 1 TABLET BY MOUTH DAILY  [DISCONTINUED] potassium chloride (KLOR-CON M) 20 MEQ extended release tablet, Take 2 tablets by mouth 2 times daily (with meals)  melatonin 10 MG CAPS capsule, Take 1 capsule by mouth nightly  midodrine (PROAMATINE) 5 MG tablet, Take 1 tablet by mouth one time a day every Tue, Thu, Sat for hypotension. Give 30 mins prior to dialysis. Handicap Placard MISC, by Does not apply route Expiration in 5 years.   sertraline (ZOLOFT) 50 MG tablet, TAKE 1 TABLET BY MOUTH DAILY (Patient taking differently: Take 50 mg by mouth nightly )  lidocaine-prilocaine (EMLA) 2.5-2.5 % cream, Apply topically as needed for Pain Apply topically as needed. 3 times a week before dialysis wrap with saran wrap  albuterol (PROVENTIL) (2.5 MG/3ML) 0.083% nebulizer solution, Take 3 mLs by nebulization every 4 hours as needed for Wheezing  hydrOXYzine (ATARAX) 25 MG tablet, TAKE ONE TABLET BY MOUTH TWO TIMES A DAY  insulin glargine (LANTUS SOLOSTAR) 100 UNIT/ML injection pen, 70 UNITS AT BEDTIME  blood glucose test strips (ONETOUCH VERIO) strip, QID  OneTouch Delica Lancets 31S MISC, QID  Blood Glucose Monitoring Suppl (ONETOUCH VERIO) w/Device KIT, AS DIRECTED  Insulin Pen Needle (SURE COMFORT PEN NEEDLES) 30G X 8 MM MISC, USE AS DIRECTED FIVE TIMES A DAILY  b complex-C-folic acid (NEPHROCAPS) 1 MG capsule, Take 1 capsule by mouth daily  LANTUS SOLOSTAR 100 UNIT/ML injection pen, 55 units at bedtime  metoprolol tartrate (LOPRESSOR) 25 MG tablet, TAKE 1/2 TABLET BY MOUTH TWO TIMES DAILY  (Patient taking differently: Take 12.5 mg by mouth 2 times daily )  [DISCONTINUED] insulin aspart (NOVOLOG FLEXPEN) 100 UNIT/ML injection pen, INJECT 8-10  units with each meals  aspirin EC 81 MG EC tablet, Take 1 tablet by mouth daily  blood glucose test strips (FREESTYLE LITE) strip, 1 each by Does not apply route 4 times daily (before meals and nightly) As needed.   Alcohol Swabs (EASY TOUCH ALCOHOL PREP MEDIUM) 70 % PADS, USE AS DIRECTED THREE TIMES A DAY  FreeStyle Lancets MISC, Test 4x daily  acetaminophen (TYLENOL) 325 MG tablet, Take 2 tablets by mouth every 4 hours as needed for Pain (For mild to moderate pain (Pain 1-6 out of 10 on pain scale))  Blood Glucose Monitoring Suppl (FREESTYLE LITE) CORETTA, 1 Device by Does not apply route daily as needed (Diabetes) Use freestyle meter to test blood sugar as needed  nitroGLYCERIN (NITROSTAT) 0.4 MG SL tablet, Place 1 tablet under the tongue every 5 minutes as needed for Chest pain    Current Hospital Medications:     Scheduled Meds:   lidocaine  3 patch TransDERmal Daily    polyethylene glycol  17 g Oral Daily    insulin lispro  0-12 Units SubCUTAneous TID WC    insulin lispro  0-6 Units SubCUTAneous Nightly    heparin (porcine)  2,000 Units IntraVENous Once    epoetin chadwick-epbx  10,000 Units SubCUTAneous Once    vancomycin (VANCOCIN) intermittent dosing (placeholder)   Other RX Placeholder    ARIPiprazole  5 mg Oral Daily    aspirin EC  81 mg Oral Daily    atorvastatin  40 mg Oral Nightly    insulin glargine  35 Units SubCUTAneous Nightly    isosorbide mononitrate  120 mg Oral Daily    magnesium oxide  400 mg Oral Daily    melatonin  10 mg Oral Nightly    midodrine  5 mg Oral Once per day on Mon Wed Fri    miconazole   Topical BID    sertraline  50 mg Oral Nightly     Continuous Infusions:   dextrose       PRN Meds:.camphor-menthol-methyl salicylate, melatonin, acetaminophen **OR** acetaminophen, prochlorperazine, albuterol, hydrOXYzine HCl, glucose, dextrose bolus **OR** dextrose bolus, glucagon (rDNA), dextrose  .  dextrose          Allergies: Allergies   Allergen Reactions    Codeine Hives     hives    Oxycontin [Oxycodone Hcl] Hives          REVIEW OF SYSTEMS    (2-9 systems for level 4, 10 or more for level 5)     Review of Systems   Constitutional: Negative for fever. HENT: Negative for hearing loss. Eyes: Negative for pain. Respiratory: Positive for wheezing. Negative for shortness of breath. Gastrointestinal: Negative for nausea. Endocrine: Negative for heat intolerance. Genitourinary: Negative for difficulty urinating. Musculoskeletal: Negative for back pain and neck pain. Skin: Negative for rash. Allergic/Immunologic: Negative for immunocompromised state. Neurological: Positive for weakness. Negative for headaches. Hematological: Bruises/bleeds easily. Psychiatric/Behavioral: Negative for sleep disturbance.          Except as noted above the remainder of the review of systems was reviewed and negative. Physical Exam     BP (!) 142/59   Pulse 82   Temp 97.7 °F (36.5 °C) (Oral)   Resp 16   Ht 5' 7\" (1.702 m)   Wt 201 lb 1 oz (91.2 kg)   LMP  (LMP Unknown)   SpO2 95%   BMI 31.49 kg/m²   Body mass index is 31.49 kg/m². Physical Exam  Vitals and nursing note reviewed. Constitutional:       Appearance: Normal appearance. She is ill-appearing. Comments: New dialysis catheter in her left upper chest   HENT:      Head: Normocephalic and atraumatic. Right Ear: External ear normal.      Left Ear: External ear normal.      Nose: Nose normal.      Mouth/Throat:      Pharynx: Oropharynx is clear. Eyes:      Extraocular Movements: Extraocular movements intact. Cardiovascular:      Pulses: Normal pulses. Pulmonary:      Effort: Pulmonary effort is normal.   Abdominal:      Palpations: Abdomen is soft. Genitourinary:     Rectum: Normal.   Musculoskeletal:         General: Normal range of motion. Cervical back: Normal range of motion. Comments: Amputated right third toe. Has had both knees replaced years ago. Skin:     General: Skin is warm. Neurological:      General: No focal deficit present. Mental Status: She is alert and oriented to person, place, and time. Motor: Weakness present. Gait: Gait abnormal.      Comments: 4/5 weakness both lower extremities   Psychiatric:         Mood and Affect: Mood normal.          I have reviewed all laboratory studies, reports, data, and pertinent images.     Objective Findings:     Vitals:   Vitals:    07/05/22 1922 07/06/22 0621 07/07/22 0504 07/07/22 0728   BP: (!) 100/38   (!) 142/59   Pulse: 81   82   Resp:       Temp:    97.7 °F (36.5 °C)   TempSrc:    Oral   SpO2: 90%   95%   Weight:  191 lb (86.6 kg) 201 lb 1 oz (91.2 kg)    Height:            Laboratory, Microbiology, Pathology, Radiology, Cardiology, Medications and Transcriptions reviewed  Scheduled Meds:   lidocaine  3 patch TransDERmal Daily  polyethylene glycol  17 g Oral Daily    insulin lispro  0-12 Units SubCUTAneous TID WC    insulin lispro  0-6 Units SubCUTAneous Nightly    heparin (porcine)  2,000 Units IntraVENous Once    epoetin chadwick-epbx  10,000 Units SubCUTAneous Once    vancomycin (VANCOCIN) intermittent dosing (placeholder)   Other RX Placeholder    ARIPiprazole  5 mg Oral Daily    aspirin EC  81 mg Oral Daily    atorvastatin  40 mg Oral Nightly    insulin glargine  35 Units SubCUTAneous Nightly    isosorbide mononitrate  120 mg Oral Daily    magnesium oxide  400 mg Oral Daily    melatonin  10 mg Oral Nightly    midodrine  5 mg Oral Once per day on Mon Wed Fri    miconazole   Topical BID    sertraline  50 mg Oral Nightly     Continuous Infusions:   dextrose         Recent Labs     07/05/22  0550 07/06/22  0558 07/07/22  0547   WBC 7.0 6.5 9.0   HGB 9.7* 10.5* 9.6*   HCT 27.7* 29.8* 26.6*   MCV 91.7 92.1 91.0    196 235     Recent Labs     07/05/22  0549 07/06/22  0557 07/07/22  0547   * 139 134*   K 4.6 4.3 4.4   CL 92* 99 96   CO2 27 29 27   PHOS 3.2 2.6 3.0   BUN 31* 15 24*   CREATININE 4.51* 3.03* 3.70*     No results for input(s): AST, ALT, ALB, BILIDIR, BILITOT, ALKPHOS in the last 72 hours. No results for input(s): LIPASE, AMYLASE in the last 72 hours. No results for input(s): PROT, INR in the last 72 hours. MRI CERVICAL SPINE WO CONTRAST    Result Date: 7/7/2022  MRI CERVICAL SPINE WO CONTRAST : 7/6/2022 CLINICAL HISTORY:  canal stenosis, falls, gait ataxia . COMPARISON: Cervical spine CT 6/30/2022 TECHNIQUE: Multiplanar MR imaging of the cervical spine was performed. FINDINGS: Motion artifact limits some sequences. The spine is visualized from the craniovertebral junction through the T3-4 level on the diagnostic sagittal sequences. There is mild to moderate reversal of the normal cervical lordosis without significant subluxation. The visualized spinal cord is normal in signal and caliber.  The paraspinous soft tissues are unremarkable. The C2-3 level is unremarkable. At the C3-4 level, there is moderate disc space narrowing, moderate diffuse disc bulging and mild hypertrophic facet changes, which results in moderate central spinal stenosis and mild neural foraminal narrowing. From the C4-C5 through C6-C7 levels, there is moderate disc space narrowing, moderate posterolateral endplate osteophytosis and mild hypertrophic facet changes, with moderate to marked central spinal stenosis and moderate neural foraminal narrowing. At the C7-T1 and T1-2 levels, there is mild to moderate posterolateral endplate osteophytosis with mild to moderate left neural foraminal narrowing. There are no significant disc extrusions identified. MODERATELY EXTENSIVE CERVICAL SPONDYLOSIS, AS DESCRIBED IN DETAIL. MODERATE TO MARKED CENTRAL SPINAL STENOSIS AND MODERATE NEURAL FORAMINAL NARROWING FROM C4-5 THROUGH C6-C7 LEVELS. MODERATE CENTRAL SPINAL STENOSIS AND MILD NEURAL FORAMINAL NARROWING AT C3-4. MILD TO MODERATE LEFT NEURAL FORAMINAL NARROWING C7-T1 AND T1-2. MRI LUMBAR SPINE WO CONTRAST    Result Date: 7/4/2022  Examination: MRI LUMBAR SPINE WO CONTRAST History: Spinal stenosis. Technique: Multiplanar multisequence MRI of the lumbar spine was obtained without intravenous contrast. Comparison: CT lumbar spine June 30, 2022 Findings: Localizer images demonstrate cervical spine degenerative changes with what appears to be high-grade spinal canal stenosis at multiple levels, findings that would be better evaluated with MRI of the cervical spine. The conus medullaris ends normally. The alignment of the lumbar spine is anatomic. The vertebral body heights are well maintained. There is no aggressive bone marrow signal abnormality. Intraosseous hemangioma within the L3 vertebral body. Chronic anterior compression deformity of T11. Disc desiccation throughout the lumbar spine.  Mild intervertebral disc height loss throughout the lumbar spine. L1-L2: No significant disc bulge, spinal canal or neuroforaminal stenosis. L2-L3: Small disc bulge. No neuroforaminal or spinal canal stenosis. L3-L4: Small disc bulge. Mild facet arthropathy. Ligamentum thickening. No neuroforaminal or spinal canal stenosis. L4-L5: Small disc bulge. Mild facet arthropathy. Ligament thickening. Mild spinal canal stenosis. No neuroforaminal stenosis. L5-S1: Small disc bulge. Mild facet arthropathy. No neuroforaminal or spinal canal stenosis. Visualized paravertebral soft tissues are grossly unremarkable. Degenerative changes of the lumbar spine as detailed. Localizer images demonstrate cervical spine degenerative changes with apparent multilevel high-grade spinal canal stenosis. Impression:     Multiple comorbidities, chronic mild elevation of her pro time and INR. On aspirin.  moderately severe cervical canal stenosis. Plan:     Patient has significant surgical risk for elective surgery. She is disabled. The cervical canal stenosis is  moderately severe with no definite myelopathy on MRI study. No weakness in the upper extremities that would correlate with her MRI findings of focal stenosis. Has mild paraparesis mostly from sarcopenia. MRI lumbar spine does show fatty replacement of the paraspinal muscles. she would not have any significant benefit from decompressive cervical surgery. Comments:     Discussed in detail with the patient all questions were answered. Neurosurgery will sign off.       Electronically signed by Sanchez August MD on 7/7/2022 at 4:23 PM

## 2022-07-07 NOTE — PROGRESS NOTES
UNABLE TO COMPLETE DIALYSIS TODAY DUE TO CLOTTED GRAFT. PT IN NO EVIDENCE OF DISTRESS. RN AND NEPHROLOGY NOTIFIED.

## 2022-07-07 NOTE — CONSULTS
5/24/2020    Anxiety     CAD S/P percutaneous coronary angioplasty 2015, 2018    stents per dr Escobar Ontiveros    CHF (congestive heart failure) ([address]     CKD (chronic kidney disease) stage 4, GFR 15-29 ml/min ([address] 2/24/2018    CKD stage 4 due to type 2 diabetes mellitus ([address]     Contusion of right chest wall 2/16/2021    COPD (chronic obstructive pulmonary disease) ([address]     Diabetic nephropathy with proteinuria ([address] 2014    DJD (degenerative joint disease) of knee     Dr Tegan Bliss GERD (gastroesophageal reflux disease)     Hemiparesis, left ([address] 2013    [facility] Assisted Living ([facility])    Hemodialysis patient [facility])     Hemodialysis-associated hypotension 10/22/2021    History of heart failure     History of seizures     History of type C viral hepatitis     HTN (hypertension)     Hyperlipidemia     Impaired mobility and activities of daily living     Mediastinal lymphadenopathy 2013    Dr Di Castellanos Virgel Radha    Metabolic syndrome     Moderate persistent asthma without complication 4/15/5282    Need for extended care facility 7/7/2021    Neurogenic urinary incontinence 2013    Neuropathy in diabetes [facility])     Nonrheumatic mitral valve regurgitation 7/7/2021    Nonrheumatic tricuspid valve regurgitation 7/7/2021    Obesity (BMI 30-39. 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 ([address]     Type 2 diabetes mellitus with renal manifestations, controlled ([address] 2015    Insulin dependent, Dr Chelly Vizcarra    Uncontrolled type 2 diabetes mellitus with hyperglycemia ([address]     Urinary incontinence due to cognitive impairment 2013    Vitamin D deficiency 2014       Social History Socioeconomic History    Marital status:      Spouse name: None    Number of children: 2    Years of education: None    Highest education level: None   Occupational History    Occupation: disabled   Tobacco Use    Smoking status: Never Smoker    Smokeless tobacco: Never Used   Vaping Use    Vaping Use: Never used   Substance and Sexual Activity    Alcohol use: No     Alcohol/week: 0.0 standard drinks    Drug use: No    Sexual activity: Not Currently   Other Topics Concern    None   Social History Narrative    Disabled, lives in [address] is close by     [place] in [place], one of 5    Twin sister Reyes, very ill in 2018, [place] 2019    Moved to [facility], , 2 children, one son and one daughter    Worked at [employer], as a nurse's aide    Disabled due to mental illness    Lived at [facility], was discharged, returned to independent living in 2017 in the daughter's house and has adjusted well    One son and one daughter, live in the same house with patient, Garima Washington pays the rent    [employer] reading ([email or web address])             10/11/2021 [facility] updates; patient lives with her daughter son-in-law and 2 grandchildren and patient's handicapped son. Per daughter, her brother is blind, MRDD, multiple health issues. Daughter's  is patient's legal guardian. Patient has hemodialysis Tuesday Thursday and Saturday. Patient's bedroom is on main floor with a half bath. Daughter walks patient upstairs once weekly for full bath. Patient is using her walker in the home. Patient has a hospital bed in the home.      Social Determinants of Health     Financial Resource Strain: Low Risk     Difficulty of Paying Living Expenses: Not hard at all   Food Insecurity: No Food Insecurity    Worried About Running Out of Food in the Last Year: Never true    Yung of Food in the Last Year: Never true   Transportation Needs: No Transportation Needs    Lack of Transportation (Medical): No    Lack of Transportation (Non-Medical): No   Physical Activity: Sufficiently Active    Days of Exercise per Week: 3 days    Minutes of Exercise per Session: 60 min   Stress:     Feeling of Stress : Not on file   Social Connections:     Frequency of Communication with Friends and Family: Not on file    Frequency of Social Gatherings with Friends and Family: Not on file    Attends Mosque Services: Not on file    Active Member of Clubs or Organizations: Not on file    Attends Club or Organization Meetings: Not on file    Marital Status: Not on file   Intimate Partner Violence:     Fear of Current or Ex-Partner: Not on file    Emotionally Abused: Not on file    Physically Abused: Not on file    Sexually Abused: Not on file   Housing Stability:     Unable to Pay for Housing in the Last Year: Not on file    Number of [place] in the Last Year: Not on file    Unstable Housing in the Last Year: Not on file       Allergies   Allergen Reactions    Codeine Hives     hives    Oxycontin [Oxycodone Hcl] Hives       No current facility-administered medications on file prior to encounter. Current Outpatient Medications on File Prior to Encounter   Medication Sig Dispense Refill    ARIPiprazole (ABILIFY) 5 MG tablet TAKE 1 TABLET BY MOUTH ONCE DAILY 30 tablet 0    nitroGLYCERIN (NITROSTAT) 0.4 MG SL tablet up to max of 3 total doses.  If no relief after 1 dose, call 911. 25 tablet 3    furosemide (LASIX) 20 MG tablet Take 1 tablet by mouth 2 times daily (Patient taking differently: Take 100 mg by mouth 2 times daily ) 60 tablet 0    NYAMYC 022459 UNIT/GM powder APPLY TO AFFECTED AREA OF ABDOMINAL FOLDS EVERY 12 HOURS AS NEEDED 60 g 5    insulin aspart (NOVOLOG FLEXPEN) 100 UNIT/ML injection pen 15 UNITS PLUS SLIDING SCALE   LESS THAN 180 NONE  181-250 2 UNITS  251-300 4 UNITS  301-400 6 UNITS   401-500 10 UNITS 10 pen 3    isosorbide mononitrate (IMDUR) 30 MG extended release tablet Take 4 tablets by mouth daily 30 tablet 3    atorvastatin (LIPITOR) 40 MG tablet TAKE 1 TABLET BY MOUTH DAILY (Patient taking differently: Take 40 mg by mouth nightly ) 30 tablet 12    magnesium oxide (MAG-OX) 400 MG tablet TAKE 1 TABLET BY MOUTH DAILY 30 tablet 12    melatonin 10 MG CAPS capsule Take 1 capsule by mouth nightly 30 capsule 12    midodrine (PROAMATINE) 5 MG tablet Take 1 tablet by mouth one time a day every Tue, Thu, Sat for hypotension. Give 30 mins prior to dialysis. 90 tablet 3    Handicap Placard MISC by Does not apply route Expiration in 5 years. 1 each 0    sertraline (ZOLOFT) 50 MG tablet TAKE 1 TABLET BY MOUTH DAILY (Patient taking differently: Take 50 mg by mouth nightly ) 30 tablet 2    lidocaine-prilocaine (EMLA) 2.5-2.5 % cream Apply topically as needed for Pain Apply topically as needed.  3 times a week before dialysis wrap with saran wrap      albuterol (PROVENTIL) (2.5 MG/3ML) 0.083% nebulizer solution Take 3 mLs by nebulization every 4 hours as needed for Wheezing 120 each 3    hydrOXYzine (ATARAX) 25 MG tablet TAKE ONE TABLET BY MOUTH TWO TIMES A DAY      insulin glargine (LANTUS SOLOSTAR) 100 UNIT/ML injection pen 70 UNITS AT BEDTIME 30 pen 3    blood glucose test strips (ONETOUCH VERIO) strip  each 3    OneTouch Delica Lancets 80L MISC  each 3    Blood Glucose Monitoring Suppl (ONETOUCH VERIO) w/Device KIT AS DIRECTED 1 kit 0    Insulin Pen Needle (SURE COMFORT PEN NEEDLES) 30G X 8 MM MISC USE AS DIRECTED FIVE TIMES A DAILY 100 each 10    b complex-C-folic acid (NEPHROCAPS) 1 MG capsule Take 1 capsule by mouth daily      LANTUS SOLOSTAR 100 UNIT/ML injection pen 55 units at bedtime 10 pen 10    metoprolol tartrate (LOPRESSOR) 25 MG tablet TAKE 1/2 TABLET BY MOUTH TWO TIMES DAILY  (Patient taking differently: Take 12.5 mg by mouth 2 times daily ) 90 tablet 4    aspirin EC 81 MG EC tablet Take 1 tablet by mouth daily 30 tablet 12    blood glucose test strips (FREESTYLE LITE) strip 1 each by Does not apply route 4 times daily (before meals and nightly) As needed. 200 strip 5    Alcohol Swabs (EASY TOUCH ALCOHOL PREP MEDIUM) 70 % PADS USE AS DIRECTED THREE TIMES A  each 3    FreeStyle Lancets MISC Test 4x daily 150 each 3    acetaminophen (TYLENOL) 325 MG tablet Take 2 tablets by mouth every 4 hours as needed for Pain (For mild to moderate pain (Pain 1-6 out of 10 on pain scale)) 120 tablet 0    Blood Glucose Monitoring Suppl (FREESTYLE LITE) CORETTA 1 Device by Does not apply route daily as needed (Diabetes) Use freestyle meter to test blood sugar as needed 1 Device 0    nitroGLYCERIN (NITROSTAT) 0.4 MG SL tablet Place 1 tablet under the tongue every 5 minutes as needed for Chest pain         Review of Systems   Constitutional: Negative. Negative for chills, diaphoresis and fever. HENT: Negative. Negative for congestion, ear pain, hearing loss and tinnitus. Eyes: Negative. Negative for photophobia. Respiratory: Negative. Negative for cough, shortness of breath and wheezing. Cardiovascular: Negative. Negative for chest pain, palpitations and leg swelling. Gastrointestinal: Negative. Negative for abdominal pain, constipation, diarrhea, nausea and vomiting. Genitourinary: Negative. Negative for dysuria, flank pain, frequency, hematuria and urgency. Musculoskeletal: Negative. Negative for back pain and neck pain. Skin: Negative. Negative for rash. Allergic/Immunologic: Negative for environmental allergies. Neurological: Negative. Negative for dizziness, tremors, weakness and headaches. Hematological: Does not bruise/bleed easily. Psychiatric/Behavioral: Negative. Negative for hallucinations and suicidal ideas. The patient is not nervous/anxious.         OBJECTIVE:  BP (!) 142/59   Pulse 82   Temp 97.7 °F (36.5 °C) (Oral)   Resp 16   Ht 5' 7\" (1.702 m)   Wt 201 lb 1 oz (91.2 kg)   LMP  (LMP Unknown) SPINAL STENOSIS AND MILD NEURAL FORAMINAL NARROWING AT C3-4. MILD TO MODERATE LEFT NEURAL FORAMINAL NARROWING C7-T1 AND T1-2. ASSESSMENT ANDPLAN:    Assessment: Patient with end-stage kidney disease and prior MRSA bacteremia. Had prior dialysis catheter removed and treated. She is now cleared from any bacteremia by infectious disease. Nephrology requested dialysis access. Plan: Placement of a tunneled dialysis catheter in the right internal jugular vein under fluoroscopy and ultrasound guidance.     Suyapa Wilson MD, Lazarus Erie

## 2022-07-07 NOTE — PROGRESS NOTES
Nephrology Progress Note    Assessment:  ESRDX  Enterococcus infection  Clotted fistula  DM type-2  OHDX Tricuspid  Valve  CABgx  stent  Hypertension  Anemia  Schizophrenia      Plan:Dr Sonny Sommers to see for temporary catheter     Patient Active Problem List:     Atherosclerotic heart disease of native coronary artery with unspecified angina pectoris (HCC)     Schizophrenia, paranoid, chronic     Metabolic syndrome     Vitamin B 12 deficiency     Cerebral microvascular disease     Mixed hyperlipidemia     Other hammer toe (acquired)     Vitamin D insufficiency     Incontinence of urine     Diabetic nephropathy with proteinuria ([address]     Essential (primary) hypertension     History of type C viral hepatitis     Urinary incontinence due to cognitive impairment     History of seizures     Stented coronary artery-plan is to stay on Plavix indefinately per Dr Larry Queen     Other specified diabetes mellitus with diabetic neuropathy, unspecified ([address]     Controlled type 2 diabetes mellitus with diabetic neuropathy, with long-term current use of insulin (HCC)     Hemiparesis, left (HCC)     Angina, class II ([address]     Pain, unspecified     Tardive dyskinesia     Shortness of breath     Chronic diastolic congestive heart failure (HCC)     Sleep apnea, unspecified     Pulmonary hypertension, unspecified (HCC)     Class 2 severe obesity with serious comorbidity and body mass index (BMI) of 36.0 to 36.9 in [facility])     Edema     Closed supracondylar fracture of right humerus     Other chronic pain     Palliative care patient     Recurrent falls     Renal failure     Difficulty in walking     ESRD (end stage renal disease) on dialysis ([address]     Weakness     Other seizures (HCC)     Moderate persistent asthma without complication     [id number]     Post PTCA     Falls frequently     Chest wall contusion, left, initial encounter     Headache, unspecified     Paranoid schizophrenia ([address]     Compression fracture of spine ([address]     Closed rib fracture     Depression     Chronic obstructive pulmonary disease (HCC)     Critical illness polyneuropathy ([facility])     Multiple closed fractures of ribs of right side     Nonrheumatic mitral valve regurgitation     Nonrheumatic tricuspid valve regurgitation     Need for extended care facility     Chronic pain of both shoulders     Hemodialysis-associated hypotension     Anginal chest pain at rest [facility])     Chest pain     Unstable angina ([facility])     NSTEMI (non-ST elevated myocardial infarction) ([facility])     CKD (chronic kidney disease) stage 4, GFR 15-29 ml/min ([facility])     Hyperkalemia     Impaired mobility and activities of daily living     Dialysis patient [facility])     Unspecified open wound of right upper arm, initial encounter     Multiple closed fractures of right lower extremity and ribs     Closed T11 fracture ([facility])     Encephalopathy acute     MRSA bacteremia     Sepsis due to Enterococcus ([facility]     Local infection due to central venous catheter     DJD (degenerative joint disease), cervical     Closed head injury      Subjective:  Admit Date: 6/30/2022    Interval History: unable to do dialysis today    Medications:  Scheduled Meds:   lidocaine  3 patch TransDERmal Daily    polyethylene glycol  17 g Oral Daily    insulin lispro  0-12 Units SubCUTAneous TID WC    insulin lispro  0-6 Units SubCUTAneous Nightly    heparin (porcine)  2,000 Units IntraVENous Once    epoetin chadwick-epbx  10,000 Units SubCUTAneous Once    vancomycin (VANCOCIN) intermittent dosing (placeholder)   Other RX Placeholder    ARIPiprazole  5 mg Oral Daily    aspirin EC  81 mg Oral Daily    atorvastatin  40 mg Oral Nightly    insulin glargine  35 Units SubCUTAneous Nightly    isosorbide mononitrate  120 mg Oral Daily    magnesium oxide  400 mg Oral Daily    melatonin  10 mg Oral Nightly    midodrine  5 mg Oral Once per day on Mon Wed Fri    miconazole   Topical BID    sertraline  50 mg Oral Nightly     Continuous Infusions:   dextrose         CBC:   Recent Labs     07/06/22  0558 07/07/22  0547   WBC 6.5 9.0   HGB 10.5* 9.6*    235     CMP:    Recent Labs     07/05/22  0549 07/06/22  0557 07/07/22  0547   * 139 134*   K 4.6 4.3 4.4   CL 92* 99 96   CO2 27 29 27   BUN 31* 15 24*   CREATININE 4.51* 3.03* 3.70*   GLUCOSE 147* 86 66*   CALCIUM 9.5 9.5 9.6   LABGLOM 9.8* 15.5* 12.3*     Troponin: No results for input(s): TROPONINI in the last 72 hours. BNP: No results for input(s): BNP in the last 72 hours. INR: No results for input(s): INR in the last 72 hours. Lipids: No results for input(s): CHOL, LDLDIRECT, TRIG, HDL, AMYLASE, LIPASE in the last 72 hours. Liver:   Recent Labs     07/07/22  0547   LABALBU 3.6     Iron:  No results for input(s): IRONS, FERRITIN in the last 72 hours. Invalid input(s): LABIRONS  Urinalysis: No results for input(s): UA in the last 72 hours.     Objective:  Vitals: BP (!) 142/59   Pulse 82   Temp 97.7 °F (36.5 °C) (Oral)   Resp 16   Ht 5' 7\" (1.702 m)   Wt 201 lb 1 oz (91.2 kg)   LMP  (LMP Unknown)   SpO2 95%   BMI 31.49 kg/m²    Wt Readings from Last 3 Encounters:   07/07/22 201 lb 1 oz (91.2 kg)   06/22/22 217 lb (98.4 kg)   06/06/22 215 lb 9.8 oz (97.8 kg)      24HR INTAKE/OUTPUT:  No intake or output data in the 24 hours ending 07/07/22 0945    General: alert, in no apparent distress  HEENT: normocephalic, atraumatic, anicteric  Neck: supple, no mass  Lungs: non-labored respirations, clear to auscultation bilaterally  Heart: regular rate and rhythm, no murmurs or rubs  Abdomen: soft, non-tender, non-distended  Ext: no cyanosis, no peripheral edema  Neuro: alert and oriented, no gross abnormalities  Psych: normal mood and affect  Skin: no rash      Electronically signed by Leah Cheng DO, MD

## 2022-07-07 NOTE — PROGRESS NOTES
Hospitalist Progress Note      PCP: Em Asencio MD    Date of Admission: 6/30/2022    Chief Complaint:  No acute events, afebrile, stable HD    Medications:  Reviewed    Infusion Medications    dextrose       Scheduled Medications    lidocaine  3 patch TransDERmal Daily    polyethylene glycol  17 g Oral Daily    insulin lispro  0-12 Units SubCUTAneous TID WC    insulin lispro  0-6 Units SubCUTAneous Nightly    heparin (porcine)  2,000 Units IntraVENous Once    epoetin chadwick-epbx  10,000 Units SubCUTAneous Once    vancomycin (VANCOCIN) intermittent dosing (placeholder)   Other RX Placeholder    ARIPiprazole  5 mg Oral Daily    aspirin EC  81 mg Oral Daily    atorvastatin  40 mg Oral Nightly    insulin glargine  35 Units SubCUTAneous Nightly    isosorbide mononitrate  120 mg Oral Daily    magnesium oxide  400 mg Oral Daily    melatonin  10 mg Oral Nightly    midodrine  5 mg Oral Once per day on Mon Wed Fri    miconazole   Topical BID    sertraline  50 mg Oral Nightly     PRN Meds: camphor-menthol-methyl salicylate, melatonin, acetaminophen **OR** acetaminophen, prochlorperazine, albuterol, hydrOXYzine HCl, glucose, dextrose bolus **OR** dextrose bolus, glucagon (rDNA), dextrose    No intake or output data in the 24 hours ending 07/07/22 1507    Exam:    BP (!) 142/59   Pulse 82   Temp 97.7 °F (36.5 °C) (Oral)   Resp 16   Ht 5' 7\" (1.702 m)   Wt 201 lb 1 oz (91.2 kg)   LMP  (LMP Unknown)   SpO2 95%   BMI 31.49 kg/m²     General appearance: appears stated age and cooperative. Respiratory:  clear to auscultation bilaterally  Cardiovascular: Regular rate and rhythm, S1/S2  Abdomen: Soft, active bowel sounds. Musculoskeletal: No edema bilaterally.       Labs:   Recent Labs     07/05/22  0550 07/06/22  0558 07/07/22  0547   WBC 7.0 6.5 9.0   HGB 9.7* 10.5* 9.6*   HCT 27.7* 29.8* 26.6*    196 235     Recent Labs     07/05/22  0549 07/06/22  0557 07/07/22  0547   * 139 134*   K 4.6 4.3 4.4   CL 92* 99 96   CO2 27 29 27   BUN 31* 15 24*   CREATININE 4.51* 3.03* 3.70*   CALCIUM 9.5 9.5 9.6   PHOS 3.2 2.6 3.0     No results for input(s): AST, ALT, BILIDIR, BILITOT, ALKPHOS in the last 72 hours. No results for input(s): INR in the last 72 hours. No results for input(s): Fartun Gazella in the last 72 hours. Urinalysis:      Lab Results   Component Value Date/Time    NITRU Negative 04/20/2022 10:45 AM    WBCUA >100 04/20/2022 10:45 AM    BACTERIA MANY 04/20/2022 10:45 AM    RBCUA 3-5 04/20/2022 10:45 AM    BLOODU TRACE 04/20/2022 10:45 AM    SPECGRAV 1.014 04/20/2022 10:45 AM    GLUCOSEU Negative 04/20/2022 10:45 AM       Radiology:  XR KNEE LEFT (3 VIEWS)   Final Result      NO DISPLACED FRACTURE OR SIGNIFICANT POSTTRAUMATIC COMPLICATION IDENTIFIED. MRI CERVICAL SPINE WO CONTRAST   Final Result      MODERATELY EXTENSIVE CERVICAL SPONDYLOSIS, AS DESCRIBED IN DETAIL. MODERATE TO MARKED CENTRAL SPINAL STENOSIS AND MODERATE NEURAL FORAMINAL NARROWING FROM C4-5 THROUGH C6-C7 LEVELS. MODERATE CENTRAL SPINAL STENOSIS AND MILD NEURAL FORAMINAL NARROWING AT C3-4. MILD TO MODERATE LEFT NEURAL FORAMINAL NARROWING C7-T1 AND T1-2. MRI LUMBAR SPINE WO CONTRAST   Final Result      Degenerative changes of the lumbar spine as detailed. Localizer images demonstrate cervical spine degenerative changes with apparent multilevel high-grade spinal canal stenosis. MRI BRAIN WO CONTRAST   Final Result      No acute ischemia or acute intracranial process. Generalized parenchymal volume loss and nonspecific white matter findings most compatible with chronic small vessel ischemic changes in a patient of this age. IR REMOVE TUNNELED CVAD WO SQ PORT/PUMP   Final Result   1. Successful removal of a tunneled internal jugular vein dialysis catheter.          DIAGNOSIS: MRSA bacteremia       RADIATION DOSE: 0.25 mGy         FINDINGS: Patient was placed supine on the procedure table. The chest and neck including the existing tunneled catheter were prepped and draped in sterile fashion. Lidocaine was used to anesthetize the skin tunnel site. Hemostats were used to dissect the Dacron    anchor cuff from the surrounding tissue. Under gentle traction and while holding pressure at the neck venotomy site, the catheter was easily removed. The catheter was checked for any missing components, catheter was intact. Pressure was held at the neck    until hemostasis was achieved. Sterile dressing was applied. Patient tolerated the procedure well without any complications. Patient was discharged home in normal and stable condition. FL MODIFIED BARIUM SWALLOW W VIDEO   Final Result      MILD TO MODERATELY ABNORMAL MODIFIED BARIUM SWALLOW. NO TRACHEAL ASPIRATION IDENTIFIED. A FULL REPORT WILL BE MADE BY THE SPEECH PATHOLOGY TEAM.               XR CHEST PORTABLE   Final Result   Findings may represent mild CHF. It is not significantly change from previous study. CT CHEST WO CONTRAST   Final Result      MINIMALLY DISPLACED ACUTE RIGHT 10TH AND 11TH RIB FRACTURES. NO OTHER ACUTE FRACTURE OR SIGNIFICANT POSTTRAUMATIC COMPLICATION IDENTIFIED. XR CHEST (2 VW)   Final Result   NO SIGNIFICANT CHANGE SINCE THE PREVIOUS EXAM. THERE HAS BEEN PLACEMENT OF A DIALYSIS CATHETER SINCE THAT EXAM.         CT HEAD WO CONTRAST   Final Result      NO ACUTE INTRACRANIAL PROCESS OR SIGNIFICANT CHANGE FROM 10/11/2021 IDENTIFIED. CT CERVICAL SPINE WO CONTRAST   Final Result   SIGNIFICANT DEGENERATIVE ARTHRITIS. NO FRACTURE IS SEEN. CT THORACIC SPINE WO CONTRAST   Final Result   OLD COMPRESSION FRACTURE OF T11. AN ACUTE FRACTURE IS NOT SEEN. BILATERAL PLEURAL EFFUSIONS. CT LUMBAR SPINE WO CONTRAST   Final Result   MILD DEGENERATIVE ARTHRITIS. NO FRACTURE.          CT ABDOMEN PELVIS WO CONTRAST Additional Contrast? None   Final Result      MINIMALLY DISPLACED ACUTE FRACTURES OF THE POSTERIOR RIGHT 10TH AND 11TH RIBS. NO OTHER SIGNIFICANT RECENT POSTTRAUMATIC COMPLICATION IDENTIFIED. CHRONIC FINDINGS, NOT SIGNIFICANTLY CHANGED FROM 4/20/2022. Assessment/Plan:    57year-old female with history of CAD s/p CABG/PCI, s/p TV repair, CVA with left sided weakness, IDDM2, ESRD on HD, anemia,  schizophrenia, COVID-19 pneumonia, obesity, T11 fracture who presented with weakness and falls. E.Faecalis BSI  - repeat blood cultures and dialysis catheter tip no growth  - on IV Vanco  - NOELLE was negative per cardiology  - management per ID    Acute right 10th and 11th rib fractures s/p fall  - pain control    Confusion/AMS, weakness, falls  - MRI brain was negative for acute findings  - MRI of the lumbar spine showed DDD, no significant stenosis  - MRI of the cervical spine showed severe stenosis  - neurosurgery was consulted  - followed by neurology     ESRD on HD  - unable to have dialysis today due to clotted graft  - IR consulted to HD catheter placement  - management per nephrology    CAD s/p CABG/PCI  - on ASA, Lipitor therapy  - followed by cardiology    IDDM2 with hyperglycemia  - on ISS    Anemia  - follow H/H    Thrombocytopenia  - improving, monitor    Schizophrenia  - on Abilify, zoloft    Severe malnutrition  - Diet: ADULT DIET;  Dysphagia - Minced and Moist; 3 carb choices (45 gm/meal)   - followed by dietitian    Code Status: Full Code      Disposition - acute rehab when OK with ID and neurology        Electronically signed by Juanis Nina MD on 7/7/2022 at 3:07 PM

## 2022-07-07 NOTE — PROGRESS NOTES
[facility]   Facility/Department: Perlita Basurto  Speech Language Pathology    Alex Wang  1960  [id number]    Date: 7/7/2022      Speech Therapy attempted to see Alex Wang on this date for a/an:    Treatment    Pt was unable to be seen due to: Other: patient care in progress. Will re-attempt at the earliest opportunity.        Electronically signed by ANN MARIE Garcia on 7/7/22 at 9:28 AM EDT

## 2022-07-07 NOTE — PROGRESS NOTES
[facility]  Facility/Department: Irving Morelos  Speech Language Pathology   Treatment Note      Silvia Young  1960  [id number]  [x]   confirmed      Date: 2022    Weakness [R53.1]  Physical deconditioning [R53.81]  Bilateral pleural effusion [J90]  At high risk for injury related to fall [Z91.81]  Concussion without loss of consciousness, initial encounter [S06.0X0A]  Fall from standing, initial encounter [W19. XXXA]  Chronic renal failure, stage 5 (HCC) [N18.5]  Closed fracture of multiple ribs of right side, initial encounter [S22.41XA]  Class 1 obesity due to excess calories with serious comorbidity and body mass index (BMI) of 33.0 to 33.9 in adult [E66.09, Z68.33]  MRSA bacteremia [R78.81, B95.62]    Restrictions/Precautions: Fall Risk  Position Activity Restriction  Other position/activity restrictions: L Rib fractures - 10 and 11 per patient    Weight: 201 lb 1 oz (91.2 kg)     ADULT DIET; Dysphagia - Minced and Moist; 3 carb choices (45 gm/meal)    SpO2: 95 % (22 0728)  O2 Flow Rate (L/min): 2 L/min (22 1727)  No active isolations    Speech Dx: Dysphagia    Subjective:  Alert, Cooperative and Pleasant        Interventions used this date:  Dysphagia Treatment      Objective/Assessment:  Patient progressing towards goals:  Short-term Goals  Timeframe for Short-term Goals: 1 week  Goal 1: Pt will complete lingual and bolus control exercises that promote anterior to posterior propulsion of bolus with 80% accuracy in order to strengthen the muscles of the swallow to decrease risk of aspiration and to increase ability to safely handle the least restrictive diet level. Patient completed tongue press exercise x15 with good effort with min verbal cues. Goal 2: Pt will tolerate recommended diet with no overt s/s of difficulty or aspiration.   Patient tolerated single sips of thin liquid via cup without overt s/s of aspiration in all opportunities, approximately 3oz total.   Goal 3: Pt will tolerate po trials of soft and bite size with adequate mastication and no pocketing or s/s of aspiration 100%x. Patient trialed soft solids. Patient demonstrated adequate mastication and oral clearance of bolus in all opportunities independently. Patient consumed full cup of diced peaches. Goal 4: Pt will use safe swallow strategies of upright positioning, small bites/sips, and slow rate with min cues. Patient re-educated on recommended swallowing strategies. Patient demonstrated verbal understanding but may require reinforcement. Recommend diet upgrade to soft & bite sized solids, continue with thin liquids. Notified FLAQUITO Barrett. Treatment/Activity Tolerance:  Patient tolerated treatment well    Plan: Alter current POC (see above)    Pain Assessment:  Patient does not c/o pain. Pain Re-assessment:  Patient does not c/o pain. Patient/Caregiver Education:  Patient educated on session and progression towards goals. Patient stated verbal understanding of directions.     Safety Devices:  Bed alarm in place and Call light within reach      Therapy Time  Time in: 1125  Time out: 1135  Total minutes: 10    Signature: Electronically signed by ANN MARIE Schmitt on 7/7/2022 at 1:06 PM

## 2022-07-07 NOTE — PROGRESS NOTES
Infectious Disease     Patient Name: Juliana Lovell  Date: 7/7/2022  YOB: 1960  Medical Record Number: [id number]        Sepsis with Enterococcus  Infected dialysis cath     mitral valve regurgitation  coronary artery bypass graft x1 vessel with tricuspid valve repair on 1/21/2022      Blood cultures from admission 2 sets growing gram-positive cocci in chains  Identified as Enterococcus faecalis by PCR  Patient currently on vancomycin    Patient has dialysis catheter in left chest which has been used while her dialysis fistula in right upper extremity has been repaired she states they are now using the fistula in right arm          Review of Systems   Constitutional: Negative. Respiratory: Negative for cough and shortness of breath. Cardiovascular: Negative. Gastrointestinal: Negative. Physical Exam  Constitutional:       Appearance: She is ill-appearing. Cardiovascular:      Heart sounds: Murmur heard. Pulmonary:      Effort: Pulmonary effort is normal. No respiratory distress. Breath sounds: Normal breath sounds. No wheezing, rhonchi or rales. Abdominal:      General: Abdomen is flat. Bowel sounds are normal. There is no distension. Palpations: Abdomen is soft. There is no mass. Tenderness: There is no abdominal tenderness. There is no guarding. Blood pressure (!) 142/59, pulse 82, temperature 97.7 °F (36.5 °C), temperature source Oral, resp. rate 16, height 5' 7\" (1.702 m), weight 201 lb 1 oz (91.2 kg), SpO2 95 %, not currently breastfeeding.       .   Lab Results   Component Value Date    WBC 9.0 07/07/2022    HGB 9.6 (L) 07/07/2022    HCT 26.6 (L) 07/07/2022    MCV 91.0 07/07/2022     07/07/2022     Lab Results   Component Value Date/Time     07/07/2022 05:47 AM    K 4.4 07/07/2022 05:47 AM    K 4.3 07/04/2022 03:07 AM    CL 96 07/07/2022 05:47 AM    CO2 27 07/07/2022 05:47 AM    BUN 24 07/07/2022 05:47 AM    CREATININE 3.70 07/07/2022 05:47 AM    GLUCOSE 66 07/07/2022 05:47 AM    GLUCOSE 419 07/30/2021 08:16 AM    CALCIUM 9.6 07/07/2022 05:47 AM         7/1/22 1613   Culture Catheter Tip Culture, Catheter Tip:  NO GROWTH   Performed at [address] [place] [address]   ([phone]        6/30/22 4987   Culture, Blood Id Sensitivity  Abnormal   Cult,Blood:  POSITIVE Blood Culture   Performed at [address] [place] [address]   ([phone]   P      Organism Enterococcus faecalis Abnormal  P    Resulting Agency [address] [facility]          Susceptibility      Enterococcus faecalis (2)    Antibiotic Interpretation Microscan  Method Status    ampicillin Sensitive <=2 mcg/mL BACTERIAL SUSCEPTIBILITY PANEL BY DIYA     vancomycin Sensitive 1 mcg/mL BACTERIAL SUSCEPTIBILITY PANEL BY DIYA                   7/1/22 1613   Culture Catheter Tip Culture, Catheter Tip:  NO GROWTH          ASSESSMENT:  PLAN:    Sepsis with Enterococcus  Infected dialysis cath  Continue vancomycin    Repeat blood cultures no growth are negative from 7/1/2022    Okay to replace patient's dialysis catheter with a permanent catheter    Plan to complete 3 more weeks of vancomycin with hemodialysis

## 2022-07-08 PROBLEM — M62.84 SARCOPENIA: Status: ACTIVE | Noted: 2022-07-08

## 2022-07-08 LAB
ALBUMIN SERPL-MCNC: 3.5 G/DL (ref 3.5–4.6)
ANION GAP SERPL CALCULATED.3IONS-SCNC: 14 MEQ/L (ref 9–15)
BUN BLDV-MCNC: 30 MG/DL (ref 8–23)
CALCIUM SERPL-MCNC: 10.2 MG/DL (ref 8.5–9.9)
CHLORIDE BLD-SCNC: 96 MEQ/L (ref 95–107)
CO2: 23 MEQ/L (ref 20–31)
CREAT SERPL-MCNC: 4.05 MG/DL (ref 0.5–0.9)
GFR AFRICAN AMERICAN: 13.4
GFR NON-AFRICAN AMERICAN: 11.1
GLUCOSE BLD-MCNC: 146 MG/DL (ref 70–99)
GLUCOSE BLD-MCNC: 155 MG/DL (ref 70–99)
GLUCOSE BLD-MCNC: 208 MG/DL (ref 70–99)
GLUCOSE BLD-MCNC: 65 MG/DL (ref 70–99)
GLUCOSE BLD-MCNC: 82 MG/DL (ref 70–99)
GLUCOSE BLD-MCNC: 83 MG/DL (ref 70–99)
HCT VFR BLD CALC: 26.6 % (ref 37–47)
HEMOGLOBIN: 9.4 G/DL (ref 12–16)
MAGNESIUM: 2.2 MG/DL (ref 1.7–2.4)
MCH RBC QN AUTO: 32.4 PG (ref 27–31.3)
MCHC RBC AUTO-ENTMCNC: 35.3 % (ref 33–37)
MCV RBC AUTO: 91.8 FL (ref 82–100)
PDW BLD-RTO: 14.5 % (ref 11.5–14.5)
PERFORMED ON: ABNORMAL
PERFORMED ON: NORMAL
PHOSPHORUS: 3.9 MG/DL (ref 2.3–4.8)
PLATELET # BLD: 255 K/UL (ref 130–400)
POTASSIUM SERPL-SCNC: 5.1 MEQ/L (ref 3.4–4.9)
RBC # BLD: 2.89 M/UL (ref 4.2–5.4)
SODIUM BLD-SCNC: 133 MEQ/L (ref 135–144)
WBC # BLD: 8.4 K/UL (ref 4.8–10.8)

## 2022-07-08 PROCEDURE — APPSS15 APP SPLIT SHARED TIME 0-15 MINUTES: Performed by: NURSE PRACTITIONER

## 2022-07-08 PROCEDURE — 99232 SBSQ HOSP IP/OBS MODERATE 35: CPT | Performed by: PSYCHIATRY & NEUROLOGY

## 2022-07-08 PROCEDURE — 99232 SBSQ HOSP IP/OBS MODERATE 35: CPT | Performed by: INTERNAL MEDICINE

## 2022-07-08 PROCEDURE — 6370000000 HC RX 637 (ALT 250 FOR IP): Performed by: PHYSICAL MEDICINE & REHABILITATION

## 2022-07-08 PROCEDURE — 2580000003 HC RX 258: Performed by: INTERNAL MEDICINE

## 2022-07-08 PROCEDURE — 1210000000 HC MED SURG R&B

## 2022-07-08 PROCEDURE — 6370000000 HC RX 637 (ALT 250 FOR IP): Performed by: NURSE PRACTITIONER

## 2022-07-08 PROCEDURE — 36415 COLL VENOUS BLD VENIPUNCTURE: CPT

## 2022-07-08 PROCEDURE — 85027 COMPLETE CBC AUTOMATED: CPT

## 2022-07-08 PROCEDURE — 6360000002 HC RX W HCPCS: Performed by: INTERNAL MEDICINE

## 2022-07-08 PROCEDURE — 6370000000 HC RX 637 (ALT 250 FOR IP): Performed by: INTERNAL MEDICINE

## 2022-07-08 PROCEDURE — 99232 SBSQ HOSP IP/OBS MODERATE 35: CPT | Performed by: PHYSICAL MEDICINE & REHABILITATION

## 2022-07-08 PROCEDURE — 97535 SELF CARE MNGMENT TRAINING: CPT

## 2022-07-08 PROCEDURE — 80069 RENAL FUNCTION PANEL: CPT

## 2022-07-08 PROCEDURE — 99223 1ST HOSP IP/OBS HIGH 75: CPT | Performed by: NEUROLOGICAL SURGERY

## 2022-07-08 PROCEDURE — 90935 HEMODIALYSIS ONE EVALUATION: CPT

## 2022-07-08 PROCEDURE — 83735 ASSAY OF MAGNESIUM: CPT

## 2022-07-08 PROCEDURE — 92526 ORAL FUNCTION THERAPY: CPT

## 2022-07-08 RX ADMIN — Medication 400 MG: at 11:15

## 2022-07-08 RX ADMIN — ISOSORBIDE MONONITRATE 120 MG: 60 TABLET, EXTENDED RELEASE ORAL at 11:14

## 2022-07-08 RX ADMIN — ATORVASTATIN CALCIUM 40 MG: 40 TABLET, FILM COATED ORAL at 20:17

## 2022-07-08 RX ADMIN — ARIPIPRAZOLE 5 MG: 5 TABLET ORAL at 11:15

## 2022-07-08 RX ADMIN — MIDODRINE HYDROCHLORIDE 5 MG: 5 TABLET ORAL at 11:15

## 2022-07-08 RX ADMIN — POLYETHYLENE GLYCOL 3350 17 G: 17 POWDER, FOR SOLUTION ORAL at 11:15

## 2022-07-08 RX ADMIN — ACETAMINOPHEN 325MG 650 MG: 325 TABLET ORAL at 12:55

## 2022-07-08 RX ADMIN — Medication 10 MG: at 20:17

## 2022-07-08 RX ADMIN — ACETAMINOPHEN 325MG 650 MG: 325 TABLET ORAL at 02:25

## 2022-07-08 RX ADMIN — MICONAZOLE NITRATE: 2 POWDER TOPICAL at 20:18

## 2022-07-08 RX ADMIN — INSULIN GLARGINE 35 UNITS: 100 INJECTION, SOLUTION SUBCUTANEOUS at 20:17

## 2022-07-08 RX ADMIN — INSULIN LISPRO 2 UNITS: 100 INJECTION, SOLUTION INTRAVENOUS; SUBCUTANEOUS at 20:17

## 2022-07-08 RX ADMIN — Medication: at 20:19

## 2022-07-08 RX ADMIN — SERTRALINE HYDROCHLORIDE 50 MG: 50 TABLET ORAL at 20:17

## 2022-07-08 RX ADMIN — PROCHLORPERAZINE EDISYLATE 10 MG: 5 INJECTION INTRAMUSCULAR; INTRAVENOUS at 12:55

## 2022-07-08 RX ADMIN — MICONAZOLE NITRATE: 2 POWDER TOPICAL at 11:20

## 2022-07-08 RX ADMIN — ASPIRIN 81 MG: 81 TABLET, COATED ORAL at 11:15

## 2022-07-08 RX ADMIN — VANCOMYCIN HYDROCHLORIDE 1250 MG: 1.25 INJECTION, POWDER, LYOPHILIZED, FOR SOLUTION INTRAVENOUS at 13:35

## 2022-07-08 ASSESSMENT — ENCOUNTER SYMPTOMS
TROUBLE SWALLOWING: 0
VOMITING: 0
COLOR CHANGE: 0
BACK PAIN: 1
NAUSEA: 0
SHORTNESS OF BREATH: 0
CHOKING: 0
PHOTOPHOBIA: 0
RESPIRATORY NEGATIVE: 1
BACK PAIN: 0
EYE PAIN: 0
WHEEZING: 1
GASTROINTESTINAL NEGATIVE: 1

## 2022-07-08 ASSESSMENT — PAIN DESCRIPTION - DESCRIPTORS: DESCRIPTORS: ACHING

## 2022-07-08 ASSESSMENT — PAIN DESCRIPTION - ORIENTATION: ORIENTATION: MID

## 2022-07-08 ASSESSMENT — PAIN SCALES - GENERAL
PAINLEVEL_OUTOF10: 3
PAINLEVEL_OUTOF10: 3

## 2022-07-08 ASSESSMENT — PAIN DESCRIPTION - LOCATION: LOCATION: BACK

## 2022-07-08 NOTE — PROGRESS NOTES
cognitive impairment     History of type C viral hepatitis     Diabetic nephropathy with proteinuria (HCC)     Incontinence of urine 04/07/2014    Vitamin D insufficiency 02/04/2014    Vitamin B 12 deficiency 06/05/2013    Cerebral microvascular disease 06/05/2013    Hemiparesis, left ([address] 01/01/2013    Schizophrenia, paranoid, chronic     Metabolic syndrome     Mixed hyperlipidemia 12/09/2010    Other hammer toe (acquired) 12/13/2007        Past Medical History:   Diagnosis Date    Angina at rest [facility]) 5/24/2020    Anxiety     CAD S/P percutaneous coronary angioplasty 2015, 2018    stents per dr Frank Vasquez    CHF (congestive heart failure) ([address]     CKD (chronic kidney disease) stage 4, GFR 15-29 ml/min ([address] 2/24/2018    CKD stage 4 due to type 2 diabetes mellitus ([address]     Contusion of right chest wall 2/16/2021    COPD (chronic obstructive pulmonary disease) ([address]     Diabetic nephropathy with proteinuria ([address] 2014    DJD (degenerative joint disease) of knee     Dr Sandra Villanueva GERD (gastroesophageal reflux disease)     Hemiparesis, left ([address] 2013    [facility]    Hemodialysis patient [facility])     Hemodialysis-associated hypotension 10/22/2021    History of heart failure     History of seizures     History of type C viral hepatitis     HTN (hypertension)     Hyperlipidemia     Impaired mobility and activities of daily living     Mediastinal lymphadenopathy 2013    Amelia Araiza    Metabolic syndrome     Moderate persistent asthma without complication 4/48/7693    Need for extended care facility 7/7/2021    Neurogenic urinary incontinence 2013    Neuropathy in diabetes [facility])     Nonrheumatic mitral valve regurgitation 7/7/2021    Nonrheumatic tricuspid valve regurgitation 7/7/2021    Obesity (BMI 30-39. 9)     Recurrent UTI     S/P colonoscopy 2014    [facility], focal active colitis    Schizophrenia, paranoid, chronic ([address]     [facility]    Small vessel disease, cerebrovascular     Status post total knee replacement, right     Status post total left knee replacement 2018   Lary Henriquez 2020    [facility] third toe of right foot [facility]     Type 2 diabetes mellitus with renal manifestations, controlled ([address] 2015    Insulin dependent, Dr Chelly Vizcarra    Uncontrolled type 2 diabetes mellitus with hyperglycemia ([address]     Urinary incontinence due to cognitive impairment     Vitamin D deficiency      Past Surgical History:   Procedure Laterality Date     SECTION      x1    COLONOSCOPY  2014    Dr. Collin Chapa      x1 Dr. Nelson River, Dr Yunior Trevino 2018    Meredith Rosio  08/10/2021    [facility]    DIAGNOSTIC CARDIAC CATH LAB PROCEDURE  10/02/2019    DIALYSIS CATHETER INSERTION Left 2022    Tunneled Symetrex 15.5F x 23cm hemodialysis catheter inserted by Dr. Faheem Ho, TOTAL ABDOMINAL (CERVIX REMOVED)      one ovary intact, Dr Jesus Lauren, menorrhagia    IR THROMBECTOMY PERCUT AVF  2022    IR THROMBECTOMY PERCUT AVF 2022 [facility]    IR TUNNELED CATHETER PLACEMENT GREATER THAN 5 YEARS  2022    IR TUNNELED CATHETER PLACEMENT GREATER THAN 5 YEARS 6/3/2022 [facility]    IR TUNNELED CATHETER PLACEMENT GREATER THAN 5 YEARS Left 2022    15.5 fr by 23 cm Symetrex dialysis catheter inserted by Dr Ana Martin IR TUNNELED [address] 5 YEARS  2022    IR TUNNELED CATHETER PLACEMENT GREATER THAN 5 YEARS 2022 [facility] PROC[facility]    [facility] TOTAL KNEE ARTHROPLASTY Left 2018    LEFT KNEE TOTAL KNEE ARTHROPLASTY, SHAYNA, NERVE BLOCK performed by Jozef Bernstein MD at [address] [place] [address] [facility]      TOE AMPUTATION Right     TOTAL KNEE ARTHROPLASTY  2016    Dr Tegan Bliss TUNNELED [address] Right 2020    tunneled HD catheter per Dr Barr Mode       Chart Reviewed: Yes  Family / Caregiver Present: No    Restrictions:  Restrictions/Precautions: Fall Risk  Position Activity Restriction  Other position/activity restrictions: L Rib fractures - 10 and 11 per patient    SUBJECTIVE:   Subjective: \"I'm really tired. \"    Pain  Pain: 5/10 back pain. OBJECTIVE:        Bed mobility  Supine to Sit: Stand by assistance  Sit to Supine: Stand by assistance  Bed Mobility Comments: Requires increased time and effort to complete. Good sequencing. Utilizes bed rail. Transfers  Sit to Stand: Stand by assistance  Stand to sit: Stand by assistance  Comment: STS x5 for improved technique and ability. Ambulation  Comments: deferred at this time by pt d/t having a bowel movement. Activity Tolerance  Activity Tolerance: Patient tolerated treatment well          ASSESSMENT   Assessment: tx limited by bowel movement and pt requesting to be cleaned by NSG. pt able to complete STS x5 this date. Discharge Recommendations:  Continue to assess pending progress         Goals  Short Term Goals  Time Frame for Short term goals: 2-4 weeks  Short term goal 1: Indep HEP for symptom mgmt  Short term goal 2: Indep bed mobility  Short term goal 3: Indep transfers bed to chair with assistive device  Short term goal 4: Ambulate with rollator Indep /=50' to allow safe return home. Patient Goals   Patient goals : Improve function    PLAN    Plan: 1 time a day 3-6 times a week  Safety Devices  Type of Devices:  All fall risk precautions in place,Call light within reach,Left in bed,Bed alarm in place     AMPAC (6 CLICK) BASIC MOBILITY  AM-PAC Inpatient Mobility Raw Score : 17      Therapy Time   Individual   Time In 1410   Time Out 1418   Minutes 8      BM/Trsf: [address], [facility], 07/08/22 at 2:31 PM         Definitions for assistance levels  Independent = pt does not require any physical supervision or assistance from another person for activity completion. Device may be needed.   Stand by assistance = pt requires verbal cues or instructions from another person, close to but not touching, to perform the activity  Minimal assistance= pt performs 75% or more of the activity; assistance is required to complete the activity  Moderate assistance= pt performs 50% of the activity; assistance is required to complete the activity  Maximal assistance = pt performs 25% of the activity; assistance is required to complete the activity  Dependent = pt requires total physical assistance to accomplish the task

## 2022-07-08 NOTE — PROGRESS NOTES
[facility]  [facility]ility/Department[facility]  Speech Language Pathology   Treatment Note      Taft Shock  1960  [id number]  [x]   confirmed      Date: 2022    Weakness [R53.1]  Physical deconditioning [R53.81]  Bilateral pleural effusion [J90]  At high risk for injury related to fall [Z91.81]  Concussion without loss of consciousness, initial encounter [S06.0X0A]  Fall from standing, initial encounter [W19. XXXA]  Chronic renal failure, stage 5 (HCC) [N18.5]  Closed fracture of multiple ribs of right side, initial encounter [S22.41XA]  Class 1 obesity due to excess calories with serious comorbidity and body mass index (BMI) of 33.0 to 33.9 in adult [E66.09, Z68.33]  MRSA bacteremia [R78.81, B95.62]    Restrictions/Precautions: Fall Risk  Position Activity Restriction  Other position/activity restrictions: L Rib fractures - 10 and 11 per patient    Weight: 195 lb 1.7 oz (88.5 kg)     ADULT DIET; Dysphagia - Soft and Bite Sized; 3 carb choices (45 gm/meal)    SpO2: 97 % (22 0615)  O2 Flow Rate (L/min): 2 L/min (22 1727)  No active isolations    Speech Dx: Dysphagia    Subjective:  Cooperative, Pleasant and Lethargic        Interventions used this date:  Dysphagia Treatment and Instruction in Compensatory Strategies      Objective/Assessment:  Patient progressing towards goals:  Short-term Goals  Timeframe for Short-term Goals: 1 week  Goal 1: Pt will complete lingual and bolus control exercises that promote anterior to posterior propulsion of bolus with 80% accuracy in order to strengthen the muscles of the swallow to decrease risk of aspiration and to increase ability to safely handle the least restrictive diet level. Pt completed lingual press 10/10x with good effort and lingual pullbacks with 10/0x with min verbal cues secondary to pt becoming lethargic  Goal 2: Pt will tolerate recommended diet with no overt s/s of difficulty or aspiration.   Goal 3: Pt will tolerate po trials of soft and bite size with adequate mastication and no pocketing or s/s of aspiration 100%x. Pt didn't do PO trials was laying in bed and starting to become lethargic towards end of session. Pt reports that her lunch went well. Goal 4: Pt will use safe swallow strategies of upright positioning, small bites/sips, and slow rate with min cues. Pt recalled 3/3 swallow strategies Independently      Treatment/Activity Tolerance:  Patient limited by fatigue    Plan:  Continue per POC    Pain Assessment:  Patient does not c/o pain. Pain Re-assessment:  Patient does not c/o pain. Patient/Caregiver Education:  Patient educated on session and progression towards goals. Patient stated verbal understanding of directions.     Safety Devices:  Bed alarm in place and Call light within reach      Therapy Time  SLP Individual Minutes  Time In: 1315  Time Out: Delbert  Minutes: 10            Signature: Electronically signed by ANN MARIE Lucio on 7/8/2022 at 1:28 PM

## 2022-07-08 NOTE — PROGRESS NOTES
Neurology Follow up    SUBJECTIVE: Patient seen and examined for neurology follow-up for bilateral lower extremity weakness and falls. Complains of back pain and weakness in the lower extremities. Patient has MRSA bacteremia. Dialysis catheter changed. Afebrile. NOELLE was negative. Complaining of left knee pain. still continues to have lower extremity weakness. She is able to transfer but not quite able to walk much.   Strength still remains 4/5  Ataxia with truly significant cervical spinal stenosis      Current Facility-Administered Medications   Medication Dose Route Frequency Provider Last Rate Last Admin    vancomycin (VANCOCIN) 1,250 mg in dextrose 5 % 250 mL IVPB  1,250 mg IntraVENous Once Génesis Whittaker MD        lidocaine 4 % external patch 3 patch  3 patch TransDERmal Daily Jennifer Scullin, DO   3 patch at 07/08/22 1114    camphor-menthol-methyl salicylate (BENGAY ULTRA STRENGTH) 4-10-30 % cream   Apply externally TID PRN Milta Prime, DO   Given at 07/07/22 2024    polyethylene glycol (GLYCOLAX) packet 17 g  17 g Oral Daily Nicoletto Bolzan-Roche, APRN - CNP   17 g at 07/08/22 1115    insulin lispro (HUMALOG) injection vial 0-12 Units  0-12 Units SubCUTAneous TID WC Nicoletto Bolzan-Roche, APRN - CNP   2 Units at 07/04/22 1716    insulin lispro (HUMALOG) injection vial 0-6 Units  0-6 Units SubCUTAneous Nightly Nicoletto Bolzan-Roche, APRN - CNP   1 Units at 07/07/22 2023    heparin (porcine) injection 2,000 Units  2,000 Units IntraVENous Once Cantu Pickle, DO        epoetin chadwick-epbx (RETACRIT) injection 10,000 Units  10,000 Units SubCUTAneous Once Cantu Pickle, DO        vancomycin [facility]) intermittent dosing (placeholder)   Other RX Placeholder Janel Fruit, DO        melatonin tablet 3 mg  3 mg Oral Nightly PRN Janel Fruit, DO        acetaminophen (TYLENOL) tablet 650 mg  650 mg Oral Q6H PRN Janel Fruit, DO   650 mg at 07/08/22 0225    Or    acetaminophen (TYLENOL) suppository 650 mg  650 mg Rectal Q6H PRN Sonido Goshen, DO   650 mg at 07/01/22 1354    prochlorperazine (COMPAZINE) injection 10 mg  10 mg IntraVENous TID PRN Sonido Goshen, DO   10 mg at 07/07/22 1734    albuterol (PROVENTIL) nebulizer solution 2.5 mg  2.5 mg Nebulization Q4H PRN Sonido Goshen, DO        ARIPiprazole (ABILIFY) tablet 5 mg  5 mg Oral Daily Sonido Goshen, DO   5 mg at 07/08/22 1115    aspirin EC tablet 81 mg  81 mg Oral Daily Sonido Goshen, DO   81 mg at 07/08/22 1115    atorvastatin (LIPITOR) tablet 40 mg  40 mg Oral Nightly Sonido Goshen, DO   40 mg at 07/07/22 2021    hydrOXYzine HCl (ATARAX) tablet 25 mg  25 mg Oral TID PRN [facility], DO        insulin glargine (LANTUS) injection vial 35 Units  35 Units SubCUTAneous Nightly Rolly Goshen, DO   35 Units at 07/07/22 2030    isosorbide mononitrate (IMDUR) extended release tablet 120 mg  120 mg Oral Daily Sonido Goshen, DO   120 mg at 07/08/22 1114    magnesium oxide (MAG-OX) tablet 400 mg  400 mg Oral Daily Sonido Goshen, DO   400 mg at 07/08/22 1115    melatonin disintegrating tablet 10 mg  10 mg Oral Nightly Rolly Goshen, DO   10 mg at 07/07/22 2021    midodrine (PROAMATINE) tablet 5 mg  5 mg Oral Once per day on Mon Wed Fri Roxy Hendrix, DO   5 mg at 07/08/22 1115    miconazole (MICOTIN) 2 % powder   Topical BID Sonido Goshen, DO   Given at 07/08/22 1120    sertraline (ZOLOFT) tablet 50 mg  50 mg Oral Nightly Sonido Goshen, DO   50 mg at 07/07/22 2022    glucose chewable tablet 16 g  4 tablet Oral PRN Sonido Goshen, DO        dextrose bolus 10% 125 mL  125 mL IntraVENous PRN Sonido Goshen, DO        Or    dextrose bolus 10% 250 mL  250 mL IntraVENous PRN Sonido Goshen, DO        glucagon (rDNA) injection 1 mg  1 mg IntraMUSCular PRN Sonido Goshen, DO        dextrose 5 % solution  100 mL/hr IntraVENous PRN Sonido Goshen, DO           PHYSICAL EXAM:    /65   Pulse 77   Temp 97.9 °F (36.6 °C)   Resp 18   Ht 5' 7\" (1.702 m)   Wt 195 lb 1.7 oz (88.5 kg)   LMP  (LMP Unknown)   SpO2 97%   BMI 30.56 kg/m²    General Appearance:      Skin:  normal  CVS - Normal sounds, No murmurs , No carotid Bruits  RS -CTA  Abdomen Soft, BS present  Review of Systems   Constitutional: Negative for fever. HENT: Negative for ear pain, tinnitus and trouble swallowing. Eyes: Negative for photophobia and visual disturbance. Respiratory: Negative for choking and shortness of breath. Cardiovascular: Negative for chest pain and palpitations. Gastrointestinal: Negative for nausea and vomiting. Musculoskeletal: Positive for arthralgias, back pain, gait problem and myalgias. Negative for joint swelling, neck pain and neck stiffness. Skin: Negative for color change. Allergic/Immunologic: Negative for food allergies. Neurological: Positive for weakness. Negative for dizziness, tremors, seizures, syncope, facial asymmetry, speech difficulty, light-headedness, numbness and headaches. Psychiatric/Behavioral: Negative for behavioral problems, confusion, hallucinations and sleep disturbance.      Mental Status Exam:             Level of Alertness:   awake            Orientation:   person, place, time                    Attention/Concentration:  normal            Language:  normal      Funduscopic Exam:     Cranial Nerves            Cranial nerve III           Pupils:  equal, round, reactive to light      Cranial nerves III, IV, VI           Extraocular Movements: intact      Cranial nerve V           Facial sensation:  intact      Cranial nerve VII           Facial strength: intact      Cranial nerve VIII           Hearing:  intact      Cranial nerve IX           Palate:  intact      Cranial nerve XI         Shoulder shrug:  intact      Cranial nerve XII          Tongue movement:  normal    Motor: No extremity strength of 4/ 5 with areflexia            Sensory:        Pinprick             Right Upper Extremity:  normal             Left Upper Extremity:  normal Right Lower Extremity:  normal             Left Lower Extremity:  normal           Vibration                         Touch            Proprioception                 Coordination:           Finger/Nose   Right:  normal              Left:  normal          Heel-Knee-Shin                Right:  normal              Left:  normal          Rapid Alternating Movements              Right:  normal              Left:  normal          Gait:                       Casual: proximal muscle weakness is notable upon standing reflexes:             Deep Tendon Reflexes:             Reflexes are 2 +             Plantar response:                Right:  downgoing     Patient very much unchanged   patient still transferring and not ambulating       left:  downgoing    Vascular:  Cardiac Exam:  normal         FL MODIFIED BARIUM SWALLOW W VIDEO    Result Date: 7/1/2022  FL MODIFIED BARIUM SWALLOW W VIDEO : 7/1/2022 CLINICAL HISTORY:  Choking with meal, concern for aspiration/dysphagia . COMPARISON: 5/10/2013. TECHNIQUE: Lateral videofluoroscopy was provided during speech therapy evaluation during ingestion of various barium liquids and semisolids. A total of 1.6 minutes of fluoroscopy time was used, with a total of  10 fluoroscopic series and one still saved. No diagnostic images were saved. Oral contrast:  50 mL BaSO4 FINDINGS: Mild to moderate oral dysphagia is observed with premature entry into the piriform sinuses and mild to moderate oral residual that cleared with independent re-swallowing. Endovaginal laryngeal penetration was observed with thin and soft barium consistencies that immediately cleared. Mild to moderate residual within the vallecula cleared with recent swallowing. No tracheal aspiration was identified. A full report will be made by the speech pathology team.     MILD TO MODERATELY ABNORMAL MODIFIED BARIUM SWALLOW. NO TRACHEAL ASPIRATION IDENTIFIED.  A FULL REPORT WILL BE MADE BY THE SPEECH PATHOLOGY TEAM.       Recent Labs     07/06/22  0558 07/07/22  0547 07/08/22  0632   WBC 6.5 9.0 8.4   HGB 10.5* 9.6* 9.4*    235 255     Recent Labs     07/06/22  0557 07/07/22  0547 07/08/22  0632    134* 133*   K 4.3 4.4 5.1*   CL 99 96 96   CO2 29 27 23   BUN 15 24* 30*   CREATININE 3.03* 3.70* 4.05*   GLUCOSE 86 66* 82     No results for input(s): BILITOT, ALKPHOS, AST, ALT in the last 72 hours. Lab Results   Component Value Date/Time    PROTIME 16.3 06/30/2022 07:30 AM    INR 1.3 06/30/2022 07:30 AM     No results found for: LITHIUM, DILFRTOT, VALPROATE    ASSESSMENT AND PLAN  With ataxia with falls with lower extremity weakness. A lumbar canal stenosis must be ruled out. Recommend an MRI of the lumbosacral spine and the brain given that she has underlying bacteremia to make sure this is not endocarditis. She also has peripheral neuropathy to explain her lower extremity weakness as well had findings discussed. Exam as noted above. Patient has some ataxia and lower extremity weakness. We had recommended a lumbar spine evaluation with an MRI with an MRI I of the brain to make sure there is no septic emboli given underlying blood cultures. MRI is pending. Complain of knee pain which may be causing some degree with difficult ambulation. Exam remains unchanged with lower extremity weakness and back pain with areflexia. MRI was not done as there appeared to be some issues about stents and cardiology has been consulted for the same. Will await for the MRI prior to any other recommendations    7/5/22:   MRI of the brain negative for acute findings  MRI of the lumbar spine with degenerative changes and no significant canal stenosis. Localizer identified high-grade canal stenosis of the cervical spine. Will obtain dedicated cervical spine MRI. Patient with sepsis with Enterococcus bacteremia. Infected dialysis catheter. Continues on vancomycin. NOELLE today.     I have personally performed a face to face diagnostic evaluation on this patient, reviewed all data and investigations, and am the sole provider of all clinical decisions on the neurological status of this patient. Patient was examined and unchanged examination. MRI was reviewed and is negative for any acute bleeds. MRI of the lumbar spine did not show stenosis. On the localizer there was some question of cervical spinal stenosis and will obtain a dedicated MRI of the cervical spine. MRI reviewed in person and patient examined and within 60% time spent in evaluating and managing this patient myself. I have personally performed a face to face diagnostic evaluation on this patient, reviewed all data and investigations, and am the sole provider of all clinical decisions on the neurological status of this patient. Semination as noted above patient still continues to lower EXTR weakness but I do not see any myelopathic signs. MRI of the cervical spine is still pending. Patient has no session of septic emboli. Findings were discussed with the patient NOELLE is pending. More than 60% time spent on evaluating and managing this patient by myself      7/6/2022:  MRI of the cervical spine remains pending. NOELLE negative  Rehab eval pending    7/7/22:  Gait ataxia with falls. MRI of the cervical spine showed moderate to marked central canal stenosis from C4-5 through C6-7. We will consult neurosurgery. I have personally performed a face to face diagnostic evaluation on this patient, reviewed all data and investigations, and am the sole provider of all clinical decisions on the neurological status of this patient. It is noted and patient has significant stenosis of C5-C6-C7. This explains patient gait issues but is not hyperreflexic due to peripheral neuropathy. This was seen on the markers and therefore evaluated. Details of this are discussed with the patient and we will see if she is surgical or not.   More than 60% time spent on evaluating and explaining to the patient. 7/8/2022:  Awaiting neurosurgery recommendations    I have personally performed a face to face diagnostic evaluation on this patient, reviewed all data and investigations, and am the sole provider of all clinical decisions on the neurological status of this patient. Examination unchanged and not worsened. Patient was recommended to see neurosurgery as she has cervical spinal stenosis but I do not see a consult yet. We will wait for this. More than 60% time spent on evaluating this patient and management      Darren Arguelles MD, Elvira Shrestha, American Board of Psychiatry & Neurology  Board Certified in Vascular Neurology  Board Certified in Neuromuscular Medicine  Certified in . [address]

## 2022-07-08 NOTE — FLOWSHEET NOTE
07/08/22 1055   Observations & Evaluations   Level of Consciousness Alert (0)   Oriented X 3   Heart Rhythm Regular   Respiratory Quality/Effort Unlabored   Bilateral Breath Sounds Diminished   Skin Color Pink   Skin Condition/Temp Warm   Appetite Good   Edema Generalized   Vital Signs   /70   Temp 97.9 °F (36.6 °C)   Heart Rate 77   Resp 18   Weight 189 lb 9.5 oz (86 kg)   Percent Weight Change -2.82   Pain Assessment   Pain Assessment None - Denies Pain   Post-Hemodialysis Assessment   Post-Treatment Procedures Blood returned   Machine Disinfection Process Acid/Vinegar Clean;Exterior Machine Disinfection; Heat Disinfect   Blood Volume Processed (Liters) 77.8 l/min   Dialyzer Clearance Lightly streaked   Duration of Treatment (minutes) 210 minutes   Hemodialysis Intake (ml) 400 ml   Hemodialysis Output (ml) 2400 ml   NET Removed (ml) 2000   Tolerated Treatment Good   Patient Response to Treatment REPORT GIVEN TO JAE ANTUNEZ RN.

## 2022-07-08 NOTE — CARE COORDINATION
Plan remains for transfer to [facility] once medically cleared. Awaiting NeuroSurgery consult. Will continue to follow.  Electronically signed by Adrienne Walker RN on 7/8/22 at 3:36 PM EDT

## 2022-07-08 NOTE — FLOWSHEET NOTE
Spoke to [employer] to follow up after yesterday's tunneled HD catheter placement. Pt currently at dialysis.

## 2022-07-08 NOTE — PROGRESS NOTES
[facility]  Facility/Department: [facility]  Speech Language Pathology  Communication Note      Wicho Bowman  1960  [id number]  [x]   confirmed      Date: 2022     SLP received phone call to speech therapy office from patient stating that she had been upgraded to soft and bite size diet yesterday PM. Patient reported that she is continuing to receive minced and moist trays. SLP reviewed chart and confirmed that patient was cleared for soft and bite size diet and RN was notified. SLP called and spoke to [facility] regarding situation and stated that patient's diet can be upgraded to soft and bite size.      Signature: Electronically signed by ANN MARIE Aldana on 2022 at 9:10 AM

## 2022-07-08 NOTE — PROGRESS NOTES
9. 6* 9.4*   HCT 29.8* 26.6* 26.6*    235 255     Recent Labs     07/06/22  0557 07/07/22  0547 07/08/22  0632    134* 133*   K 4.3 4.4 5.1*   CL 99 96 96   CO2 29 27 23   BUN 15 24* 30*   CREATININE 3.03* 3.70* 4.05*   CALCIUM 9.5 9.6 10.2*   PHOS 2.6 3.0 3.9     No results for input(s): AST, ALT, BILIDIR, BILITOT, ALKPHOS in the last 72 hours. No results for input(s): INR in the last 72 hours. No results for input(s): Patrisha Meigs in the last 72 hours. Urinalysis:      Lab Results   Component Value Date/Time    NITRU Negative 04/20/2022 10:45 AM    WBCUA >100 04/20/2022 10:45 AM    BACTERIA MANY 04/20/2022 10:45 AM    RBCUA 3-5 04/20/2022 10:45 AM    BLOODU TRACE 04/20/2022 10:45 AM    SPECGRAV 1.014 04/20/2022 10:45 AM    GLUCOSEU Negative 04/20/2022 10:45 AM       Radiology:  XR KNEE LEFT (3 VIEWS)   Final Result      NO DISPLACED FRACTURE OR SIGNIFICANT POSTTRAUMATIC COMPLICATION IDENTIFIED. MRI CERVICAL SPINE WO CONTRAST   Final Result      MODERATELY EXTENSIVE CERVICAL SPONDYLOSIS, AS DESCRIBED IN DETAIL. MODERATE TO MARKED CENTRAL SPINAL STENOSIS AND MODERATE NEURAL FORAMINAL NARROWING FROM C4-5 THROUGH C6-C7 LEVELS. MODERATE CENTRAL SPINAL STENOSIS AND MILD NEURAL FORAMINAL NARROWING AT C3-4. MILD TO MODERATE LEFT NEURAL FORAMINAL NARROWING C7-T1 AND T1-2. MRI LUMBAR SPINE WO CONTRAST   Final Result      Degenerative changes of the lumbar spine as detailed. Localizer images demonstrate cervical spine degenerative changes with apparent multilevel high-grade spinal canal stenosis. MRI BRAIN WO CONTRAST   Final Result      No acute ischemia or acute intracranial process. Generalized parenchymal volume loss and nonspecific white matter findings most compatible with chronic small vessel ischemic changes in a patient of this age. IR REMOVE TUNNELED CVAD WO SQ PORT/PUMP   Final Result   1. Successful removal of a tunneled internal jugular vein dialysis catheter. DIAGNOSIS: MRSA bacteremia       RADIATION DOSE: 0.25 mGy         FINDINGS:      Patient was placed supine on the procedure table. The chest and neck including the existing tunneled catheter were prepped and draped in sterile fashion. Lidocaine was used to anesthetize the skin tunnel site. Hemostats were used to dissect the Dacron    anchor cuff from the surrounding tissue. Under gentle traction and while holding pressure at the neck venotomy site, the catheter was easily removed. The catheter was checked for any missing components, catheter was intact. Pressure was held at the neck    until hemostasis was achieved. Sterile dressing was applied. Patient tolerated the procedure well without any complications. Patient was discharged home in normal and stable condition. FL MODIFIED BARIUM SWALLOW W VIDEO   Final Result      MILD TO MODERATELY ABNORMAL MODIFIED BARIUM SWALLOW. NO TRACHEAL ASPIRATION IDENTIFIED. A FULL REPORT WILL BE MADE BY THE SPEECH PATHOLOGY TEAM.               XR CHEST PORTABLE   Final Result   Findings may represent mild CHF. It is not significantly change from previous study. CT CHEST WO CONTRAST   Final Result      MINIMALLY DISPLACED ACUTE RIGHT 10TH AND 11TH RIB FRACTURES. NO OTHER ACUTE FRACTURE OR SIGNIFICANT POSTTRAUMATIC COMPLICATION IDENTIFIED. XR CHEST (2 VW)   Final Result   NO SIGNIFICANT CHANGE SINCE THE PREVIOUS EXAM. THERE HAS BEEN PLACEMENT OF A DIALYSIS CATHETER SINCE THAT EXAM.         CT HEAD WO CONTRAST   Final Result      NO ACUTE INTRACRANIAL PROCESS OR SIGNIFICANT CHANGE FROM 10/11/2021 IDENTIFIED. CT CERVICAL SPINE WO CONTRAST   Final Result   SIGNIFICANT DEGENERATIVE ARTHRITIS. NO FRACTURE IS SEEN. CT THORACIC SPINE WO CONTRAST   Final Result   OLD COMPRESSION FRACTURE OF T11. AN ACUTE FRACTURE IS NOT SEEN. BILATERAL PLEURAL EFFUSIONS.          CT LUMBAR SPINE WO CONTRAST   Final Result   MILD DEGENERATIVE ARTHRITIS. NO FRACTURE. CT ABDOMEN PELVIS WO CONTRAST Additional Contrast? None   Final Result      MINIMALLY DISPLACED ACUTE FRACTURES OF THE POSTERIOR RIGHT 10TH AND 11TH RIBS. NO OTHER SIGNIFICANT RECENT POSTTRAUMATIC COMPLICATION IDENTIFIED. CHRONIC FINDINGS, NOT SIGNIFICANTLY CHANGED FROM 4/20/2022. IR FLUORO GUIDED CVA DEVICE PLMT/REPLACE/REMOVAL    (Results Pending)   IR ULTRASOUND GUIDANCE VASCULAR ACCESS    (Results Pending)           Assessment/Plan:    57year-old female with history of CAD s/p CABG/PCI, s/p TV repair, CVA with left sided weakness, IDDM2, ESRD on HD, anemia,  schizophrenia, COVID-19 pneumonia, obesity, T11 fracture who presented with weakness and falls. E.Faecalis BSI  - repeat blood cultures and dialysis catheter tip no growth  - on IV Vanco  - NOELLE was negative per cardiology  - management per ID    Acute right 10th and 11th rib fractures s/p fall  - pain control    Confusion/AMS, weakness, falls  - MRI brain was negative for acute findings  - MRI of the lumbar spine showed DDD, no significant stenosis  - MRI of the cervical spine showed severe stenosis  - neurosurgery was consulted  - followed by neurology     ESRD on HD  - unable to have dialysis today due to clotted graft  - IR placed TDC on 7/7  - management per nephrology    CAD s/p CABG/PCI  - on ASA, Lipitor therapy  - followed by cardiology    IDDM2 with hyperglycemia  - on ISS    Anemia  - follow H/H    Thrombocytopenia  - improving, monitor    Schizophrenia  - on Abilify, zoloft    Severe malnutrition  - Diet: ADULT DIET;  Dysphagia - Soft and Bite Sized; 3 carb choices (45 gm/meal)   - followed by dietitian    Code Status: Full Code      Disposition - acute rehab today if OK with neurosurgery        Electronically signed by Shad Childers MD on 7/8/2022 at 2:12 PM

## 2022-07-08 NOTE — PROGRESS NOTES
Comprehensive Nutrition Assessment    Type and Reason for Visit:  Reassess    Nutrition Recommendations/Plan:   1. Modify diet to KRISTYN and low potassium monitoring labs   2. Resume previous diabetic supplement TID      Malnutrition Assessment:  Malnutrition Status:  Severe malnutrition (07/01/22 1105)    Context:  Social/Environmental Circumstances     Findings of the 6 clinical characteristics of malnutrition:  Energy Intake:  50% or less estimated energy requirements for 1 month or longer  Weight Loss:  Greater than 10% over 6 months     Body Fat Loss:  No significant body fat loss     Muscle Mass Loss:  No significant muscle mass loss    Fluid Accumulation:  Unable to assess     Strength:  Not Performed    Nutrition Assessment:    Pt presented with severe malnutrition but is improving from a nutrition standpoint with diet advancement. Currently on Dysphagia 3 diet, tolerating well. Intakes remain suboptimal but have progressed since admission. Noted hyperkalemia and worsening edema, will adjust diet accordingly and resume previous supplements to optimize nutritional status. Nutrition Related Findings:    Pmhx: ESRD with HD T/Th/Sat, CHF, CAD, COPD, DM, GERD, toe amputation. Presented to ED s/p fall from standing dx rib fractures. Pt initially with dysphagia leading to poor PO intake, now improving. Advanced to soft and bite sized 7/8. (7/8): Sa=183; K=5.1; Bun/Cr=40/3.05; Gluc=. UTD last BM. Worsening +1 BLE, +1 gen edema noted. Advanced to soft and bite sized 7/8. Wound Type: Surgical Incision       Current Nutrition Intake & Therapies:    Average Meal Intake: 51-75%  Average Supplements Intake: None Ordered  ADULT DIET;  Dysphagia - Soft and Bite Sized; 3 carb choices (45 gm/meal)    Anthropometric Measures:  Height: 5' 7\" (170.2 cm)  Ideal Body Weight (IBW): 135 lbs (61 kg)    Admission Body Weight: 217 lb (98.4 kg) (stated 6/30)  Current Body Weight: 189 lb 9.5 oz (86 kg) (bed 7/8), Weight Source:

## 2022-07-08 NOTE — PROGRESS NOTES
[facility]   Facility/Department: [facility]  Speech Language Pathology    Jatin Paulo  1960  [id number]    Date: 7/8/2022      Speech Therapy attempted to see Jatin Bates on this date for a/an:    Treatment    Pt was unable to be seen due to:   Patient is currently off unit. Patient receiving dialysis.         Electronically signed by ANN MARIE Ann on 7/8/22 at 10:50 AM EDT

## 2022-07-08 NOTE — PROGRESS NOTES
Subjective: The patient complains of severe acute on chronic progressive fatigue and lower extremity pain especially in her left knee due to neuropathy and osteoarthritis partially relieved by rest, PT, OT and meds Tylenol and exacerbated by exertion and recent illness-MRSA septicemia. Her Vas-Cath was removed as it was felt to be possibly the source of infection. Her right upper extremity fistula finally became mature but its was not working [place] [address] interventional radiology to place a new port. Now in HD. A big issue consult of concern is probable central canal stenosis in her C-spine with myelopathy causing her weakness. I am concerned about patients medical complexities including:  Principal Problem:    MRSA bacteremia  Active Problems:    Impaired mobility and activities of daily living    Dialysis patient [facility])    Multiple closed fractures of right lower extremity and ribs    Closed T11 fracture ([facility] [address]    Encephalopathy acute    Sepsis due to Enterococcus ([facility] [address]    Local infection due to central venous catheter    DJD (degenerative joint disease), cervical    Closed head injury    Chronic diastolic congestive heart failure (HCC)    Closed rib fracture  Resolved Problems:    * No resolved hospital problems. *      .    Reviewed recent nursing note and discussed current status and planned care with acute care providers, \" Patient with end-stage kidney disease and prior MRSA bacteremia. Had prior dialysis catheter removed and treated. She is now cleared from any bacteremia by infectious disease. Nephrology requested dialysis access. Faith Bloodgoelder Cuevas Bloodgoelder Plan: Placement of a tunneled dialysis catheter in the right internal jugular vein under fluoroscopy and ultrasound guidance. \"    She had an x-ray of her left knee because of severe left knee pain of the tibial plateau it is unremarkable for fracture. Patient admits to severe left knee pain and tenderness.   She would like to trial tramadol and   an x-ray-was OK    ROS x10: The patient also complains of severely impaired mobility and activities of daily living. Otherwise no new problems with vision, hearing, nose, mouth, throat, dermal, cardiovascular, GI, , pulmonary, musculoskeletal, psychiatric or neurological.        Vital signs:  BP (!) 152/66   Pulse 77   Temp 97.9 °F (36.6 °C)   Resp 18   Ht 5' 7\" (1.702 m)   Wt 195 lb 12.3 oz (88.8 kg)   LMP  (LMP Unknown)   SpO2 97%   BMI 30.66 kg/m²   I/O:   PO/Intake:    fair PO intake, monitoring for dysphagia    Bowel/Bladder:   continent,    General:  Patient is well developed, adequately nourished, and    well kempt. HEENT:    PERRLA, hearing intact to loud voice, external inspection of ear and nose benign. Inspection of lips, tongue and gums benign  Musculoskeletal: No significant change in strength or tone. All joints stable. Inspection and palpation of digits and nails show no clubbing, cyanosis or inflammatory conditions. Neuro/Psychiatric: Affect: flat-  Alert and oriented to self and situation with min cues. No significant change in deep tendon reflexes or sensation  Lungs:  Diminished, CTA-B  . Respiration effort is normal at rest.   Heart:   S1 = S2,   RRR. Abdomen:  Soft, non-tender    Extremities:  Trace  lower extremity edema but no unusual tenderness. Arthritic pain left knee-tender at the tibial plateau. Skin:   BUE bruises dt blood draws-PVD discoloration bilateral lower extremities      Rehabilitation:  Physical Therapy:   Bed mobility:  Bed mobility  Supine to Sit: Stand by assistance (07/07/22 1422)  Sit to Supine: Stand by assistance (07/07/22 1422)  Scooting: Stand by assistance (07/07/22 1422)  Bed Mobility Comments: Requires increased time and effort to complete. Good sequencing. Utilizes bed rail.  (07/07/22 1422)  Transfers:  Transfers  Sit to Stand: Stand by assistance (07/07/22 1423)  Stand to sit: Stand by assistance (07/07/22 1423)  Bed to Chair: Contact guard assistance (07/02/22 1224)  Comment: Requires increased time to stabilize self upon standing (07/07/22 1423)  Gait:   Ambulation  Surface: level tile (07/07/22 1424)  Device: Rollator (07/07/22 1424)  Assistance: Minimal assistance (07/07/22 1424)  Quality of Gait: gait pattern varies- step through to reciprocal gait, antalgia, decreased management and control of rollator, min A to stabilize (07/07/22 1424)  Gait Deviations: Slow Tere (07/06/22 1021)  Distance: 25 feet (07/07/22 1424)  Comments: pt fatigued with this distance declines to ambulate further. (07/06/22 1021)  Stairs:  Stairs/Curb  Stairs?: No (07/07/22 1424)  W/C mobility:       Occupational Therapy:      ADL  Feeding: Setup (07/03/22 1211)  Grooming: Minimal assistance (07/03/22 1211)  UE Dressing: Minimal assistance (07/03/22 1211)  LE Dressing: Minimal assistance (07/06/22 1121)  LE Dressing Skilled Clinical Factors: pants and socks (07/06/22 1121)  Toileting: Dependent/Total (07/03/22 1211)  Toileting Skilled Clinical Factors: reports significant difficulty with hygiene/clothing management (07/03/22 1211)  Additional Comments: Simulated ADLs. Pt complaining of fatigue with activity. Pt reports unable to assist with clothing over feet. Pt on purewick upon entering room but asked OT to remove it due to discomfort (07/03/22 1211)             Speech Therapy:            Diet/Swallow:        Dysphagia Outcome Severity Scale: Level 4: Mild moderate dysphagia- Intermittent supervision/cueing.  One - two diet consistencies restricted  Compensatory Swallowing Strategies : Eat/Feed slowly,Upright as possible for all oral intake,Small bites/sips  Therapeutic Interventions: Patient/Family education,Oral motor exercises,Diet tolerance monitoring    COGNITION  OT:    SP:           Lab/X-ray studies reviewed, analyzed and discussed with patient and staff:     XR KNEE LEFT (3 VIEWS) : 7/6/2022       CLINICAL HISTORY:  knee pain post fall .       COMPARISON: 1/4/2019     TECHNIQUE: AP, lateral, internal and external oblique radiographs of the left knee were obtained.           FINDINGS:        A left total knee arthroplasty appears unremarkable.       There is no fracture, dislocation, significant joint effusion, evidence hardware loosening, worrisome bone destruction, or other significant changes from 1/4/2019 identified.                     Impression       NO DISPLACED FRACTURE OR SIGNIFICANT POSTTRAUMATIC COMPLICATION IDENTIFIED. L1-L2: No significant disc bulge, spinal canal or neuroforaminal stenosis.        L2-L3: Small disc bulge. No neuroforaminal or spinal canal stenosis.       L3-L4: Small disc bulge. Mild facet arthropathy. Ligamentum thickening. No neuroforaminal or spinal canal stenosis.       L4-L5: Small disc bulge. Mild facet arthropathy. Ligament thickening. Mild spinal canal stenosis. No neuroforaminal stenosis.       L5-S1: Small disc bulge. Mild facet arthropathy. No neuroforaminal or spinal canal stenosis.           MODERATELY EXTENSIVE CERVICAL SPONDYLOSIS, AS DESCRIBED IN DETAIL.       MODERATE TO MARKED CENTRAL SPINAL STENOSIS AND MODERATE NEURAL FORAMINAL NARROWING FROM C4-5 THROUGH C6-C7 LEVELS.       MODERATE CENTRAL SPINAL STENOSIS AND MILD NEURAL FORAMINAL NARROWING AT C3-4.       MILD TO MODERATE LEFT NEURAL FORAMINAL NARROWING C7-T1 AND T1-2.          Recent Results (from the past 24 hour(s))   POCT Glucose    Collection Time: 07/07/22 11:58 AM   Result Value Ref Range    POC Glucose 120 (H) 70 - 99 mg/dl    Performed on ACCU-CHEK    POCT Glucose    Collection Time: 07/07/22  5:20 PM   Result Value Ref Range    POC Glucose 129 (H) 70 - 99 mg/dl    Performed on ACCU-CHEK    POCT Glucose    Collection Time: 07/07/22  8:15 PM   Result Value Ref Range    POC Glucose 192 (H) 70 - 99 mg/dl    Performed on ACCU-CHEK    CBC    Collection Time: 07/08/22  6:32 AM   Result Value Ref Range    WBC 8.4 4.8 - 10.8 K/uL    RBC 2.89 (L) 4.20 - 5.40 M/uL Hemoglobin 9.4 (L) 12.0 - 16.0 g/dL    Hematocrit 26.6 (L) 37.0 - 47.0 %    MCV 91.8 82.0 - 100.0 fL    MCH 32.4 (H) 27.0 - 31.3 pg    MCHC 35.3 33.0 - 37.0 %    RDW 14.5 11.5 - 14.5 %    Platelets 552 303 - 264 K/uL     Previous extensive, complex labs, notes and diagnostics reviewed and analyzed. ALLERGIES:    Allergies as of 06/30/2022 - Fully Reviewed 06/30/2022   Allergen Reaction Noted    Codeine Hives 12/09/2011    Oxycontin [oxycodone hcl] Hives 06/15/2020      (please also verify by checking [facility])     Complex Physical Medicine & Rehab Issues Assess & Plan:   1. Severe abnormality of gait and mobility and impaired self-care and ADL's secondary to   MRSA septicemia. Updated functional and medical status reassessed regarding patients ability to participate in therapies and patient found to be able to participate in:        acute intensive comprehensive inpatient rehabilitation program including PT/OT to improve balance, ambulation, ADLs, and to improve the P/AROM. It is my opinion that they will be able to tolerate 3 hours of therapy a day and benefit from it at an acute level. I again discussed acute rehab with the patient and verify that the patient is able and willing to participate in 3 hours of therapy a day. Rehab and Acute Care Case Management has also reinforced this expectation. Will continue to follow to attempt to get patient to the most efficient but most effective level of care will be in their best interest.  Continue to focus on energy conservation heart rate and blood pressure monitoring before during and after therapy endurance and consistency of function. 2. Bowel constipation   and Bladder dysfunction   overactive, neurogenic bladder:  frequent toileting, ambulate to bathroom with assistance, check post void residuals.   Check for C.difficile x1 if >2 loose stools in 24 hours, continue bowel & bladder program.  Monitor for UTI symptoms including lethargy and confusion    3. Severe spinal stenosis and left knee pain secondary to osteoarthritis and generalized OA pain: reassess pain every shift and prior to and after each therapy session, give prn Tylenol  and consider scheduled Tylenol, modalities prn in therapy, consider Lidoderm, K-pad prn. Patient has rare use of  Ultram at home with Lyrica. Resume Ultram if okay with medical.   left knee x-ray-was reviewed and unremarkable for fracture however cervical spine films show severe cervical spinal stenosis as detailed below and above. 4. Skin breakdown   risk:  continue pressure relief program.  Daily skin exams and reports from nursing. 5. Severe fatigue due to immobility and nutritional deficits: monitoring for dysphagia   Add vitamin B12 vitamin D and CoQ10 titrate dosing and add protein supplementation with low carb content. 6. Complex discharge planning:   Discussed with care team-last 24 hour events noted. I will continue to follow along and reassess functional and medical status as we strive to improve patient's functional and medical outcomes progressing to the most efficient and lowest level of care. Complex Active General Medical Issues that complicate care:     1. Principal Problem:    MRSA bacteremia  Active Problems:    Impaired mobility and activities of daily living    Dialysis patient [facility])    Multiple closed fractures of right lower extremity and ribs    Closed T11 fracture ([facility] [address]    Encephalopathy acute    Sepsis due to Enterococcus ([facility] [address]    Local infection due to central venous catheter    DJD (degenerative joint disease), cervical    Closed head injury    Chronic diastolic congestive heart failure (HCC)    Closed rib fracture  Resolved Problems:    * No resolved hospital problems. *          Events and functional changes in the past 24 hours reviewed improvements in functional status are encouraging       Focus of today's plan-patient will need to get a NOELLE to rule out endocarditis.   In the meantime focus on pain control add massage Bengay and heat as well as Lidoderm Derm patches to her left knee. - OK to transition to acute rehab.       Lavelle Pisano D.O., PM&R     Attending    [address]

## 2022-07-08 NOTE — PROGRESS NOTES
Infectious Disease     Patient Name: Breanna Coleman  Date: 7/8/2022  YOB: 1960  Medical Record Number: [id number]        Sepsis with Enterococcus  Infected dialysis cath     mitral valve regurgitation  coronary artery bypass graft x1 vessel with tricuspid valve repair on 1/21/2022      Blood cultures from admission 2 sets growing gram-positive cocci in chains  Identified as Enterococcus faecalis by PCR  Patient currently on vancomycin    Patient has dialysis catheter in left chest which has been used while her dialysis fistula in right upper extremity has been repaired she states they are now using the fistula in right arm          Review of Systems   Constitutional: Negative. Negative for chills, diaphoresis, fatigue and fever. Respiratory: Negative. Cardiovascular: Negative. Gastrointestinal: Negative. Physical Exam  Constitutional:       Appearance: She is ill-appearing. Cardiovascular:      Heart sounds: Murmur heard. Pulmonary:      Effort: Pulmonary effort is normal. No respiratory distress. Breath sounds: Normal breath sounds. No wheezing, rhonchi or rales. Abdominal:      General: Abdomen is flat. Bowel sounds are normal. There is no distension. Palpations: Abdomen is soft. There is no mass. Tenderness: There is no abdominal tenderness. There is no guarding. Blood pressure 125/65, pulse 77, temperature 97.9 °F (36.6 °C), resp. rate 18, height 5' 7\" (1.702 m), weight 195 lb 1.7 oz (88.5 kg), SpO2 97 %, not currently breastfeeding.       .   Lab Results   Component Value Date    WBC 8.4 07/08/2022    HGB 9.4 (L) 07/08/2022    HCT 26.6 (L) 07/08/2022    MCV 91.8 07/08/2022     07/08/2022     Lab Results   Component Value Date/Time     07/08/2022 06:32 AM    K 5.1 07/08/2022 06:32 AM    K 4.3 07/04/2022 03:07 AM    CL 96 07/08/2022 06:32 AM    CO2 23 07/08/2022 06:32 AM    BUN 30 07/08/2022 06:32 AM    CREATININE 4.05 07/08/2022 06:32 AM    GLUCOSE 82 07/08/2022 06:32 AM    GLUCOSE 419 07/30/2021 08:16 AM    CALCIUM 10.2 07/08/2022 06:32 AM         7/1/22 1613   Culture Catheter Tip Culture, Catheter Tip:  NO GROWTH   Performed at [address] [place] [address]   ([phone]        6/30/22 7094   Culture, Blood Id Sensitivity  Abnormal   Cult,Blood:  POSITIVE Blood Culture   Performed at [address] [place] [address]   ([phone]   P      Organism Enterococcus faecalis Abnormal  P    Resulting Agency [address] [facility]          Susceptibility      Enterococcus faecalis (2)    Antibiotic Interpretation Microscan  Method Status    ampicillin Sensitive <=2 mcg/mL BACTERIAL SUSCEPTIBILITY PANEL BY DIYA     vancomycin Sensitive 1 mcg/mL BACTERIAL SUSCEPTIBILITY PANEL BY DIYA                   7/1/22 1613   Culture Catheter Tip Culture, Catheter Tip:  NO GROWTH          ASSESSMENT:  PLAN:    Sepsis with Enterococcus  Infected dialysis cath  Continue vancomycin    Repeat blood cultures no growth are negative from 7/1/2022    Okay to replace patient's dialysis catheter with a permanent catheter    Plan to complete 3 more weeks of vancomycin with hemodialysis

## 2022-07-08 NOTE — PROGRESS NOTES
Nephrology Progress Note    Assessment:  ESRDX  Enterococcus septecmia  Clotted access  Schizophrenia  Anemia  OHDX CAD diastolic HF CABGX   Hx Tricuspid valve replacenment  Hx seizures      Plan:  No new issues  Continue     Patient Active Problem List:     Atherosclerotic heart disease of native coronary artery with unspecified angina pectoris (HCC)     Schizophrenia, paranoid, chronic     Metabolic syndrome     Vitamin B 12 deficiency     Cerebral microvascular disease     Mixed hyperlipidemia     Other hammer toe (acquired)     Vitamin D insufficiency     Incontinence of urine     Diabetic nephropathy with proteinuria ([address]     Essential (primary) hypertension     History of type C viral hepatitis     Urinary incontinence due to cognitive impairment     History of seizures     Stented coronary artery-plan is to stay on Plavix indefinately per Dr Leila Orosco     Other specified diabetes mellitus with diabetic neuropathy, unspecified ([address]     Controlled type 2 diabetes mellitus with diabetic neuropathy, with long-term current use of insulin (HCC)     Hemiparesis, left (HCC)     Angina, class II (HCC)     Pain, unspecified     Tardive dyskinesia     Shortness of breath     Chronic diastolic congestive heart failure (HCC)     Sleep apnea, unspecified     Pulmonary hypertension, unspecified (HCC)     Class 2 severe obesity with serious comorbidity and body mass index (BMI) of 36.0 to 36.9 in adult [facility])     Edema     Closed supracondylar fracture of right humerus     Other chronic pain     Palliative care patient     Recurrent falls     Renal failure     Difficulty in walking     ESRD (end stage renal disease) on dialysis (HCC)     Weakness     Other seizures (HCC)     Moderate persistent asthma without complication     HOUAH-89     Post PTCA     Falls frequently     Chest wall contusion, left, initial encounter     Headache, unspecified     Paranoid schizophrenia ([address]     Compression fracture of spine ([address]     Closed rib fracture     Depression     Chronic obstructive pulmonary disease (HCC)     Critical illness polyneuropathy ([facility])     Multiple closed fractures of ribs of right side     Nonrheumatic mitral valve regurgitation     Nonrheumatic tricuspid valve regurgitation     Need for extended care facility     Chronic pain of both shoulders     Hemodialysis-associated hypotension     Anginal chest pain at rest [facility])     Chest pain     Unstable angina ([facility])     NSTEMI (non-ST elevated myocardial infarction) ([facility])     CKD (chronic kidney disease) stage 4, GFR 15-29 ml/min ([facility])     Hyperkalemia     Impaired mobility and activities of daily living     Dialysis patient [facility])     Unspecified open wound of right upper arm, initial encounter     Multiple closed fractures of right lower extremity and ribs     Closed T11 fracture ([facility])     Encephalopathy acute     MRSA bacteremia     Sepsis due to Enterococcus ([facility] [address]     Local infection due to central venous catheter     DJD (degenerative joint disease), cervical     Closed head injury      Subjective:  Admit Date: 6/30/2022    Interval History: doing well    Medications:  Scheduled Meds:   vancomycin  1,250 mg IntraVENous Once    lidocaine  3 patch TransDERmal Daily    polyethylene glycol  17 g Oral Daily    insulin lispro  0-12 Units SubCUTAneous TID WC    insulin lispro  0-6 Units SubCUTAneous Nightly    heparin (porcine)  2,000 Units IntraVENous Once    epoetin chadwick-epbx  10,000 Units SubCUTAneous Once    vancomycin (VANCOCIN) intermittent dosing (placeholder)   Other RX Placeholder    ARIPiprazole  5 mg Oral Daily    aspirin EC  81 mg Oral Daily    atorvastatin  40 mg Oral Nightly    insulin glargine  35 Units SubCUTAneous Nightly    isosorbide mononitrate  120 mg Oral Daily    magnesium oxide  400 mg Oral Daily    melatonin  10 mg Oral Nightly    midodrine  5 mg Oral Once per day on Mon Wed Fri    miconazole   Topical BID    sertraline  50 mg Oral Nightly

## 2022-07-09 ENCOUNTER — HOSPITAL ENCOUNTER (INPATIENT)
Age: 62
LOS: 11 days | Discharge: HOME | DRG: 949 | End: 2022-07-20
Attending: PHYSICAL MEDICINE & REHABILITATION | Admitting: PHYSICAL MEDICINE & REHABILITATION
Payer: MEDICARE

## 2022-07-09 VITALS
RESPIRATION RATE: 20 BRPM | TEMPERATURE: 97.6 F | DIASTOLIC BLOOD PRESSURE: 52 MMHG | OXYGEN SATURATION: 95 % | HEIGHT: 67 IN | SYSTOLIC BLOOD PRESSURE: 110 MMHG | BODY MASS INDEX: 29.76 KG/M2 | WEIGHT: 189.6 LBS | HEART RATE: 80 BPM

## 2022-07-09 DIAGNOSIS — Z74.09 IMPAIRED MOBILITY AND ACTIVITIES OF DAILY LIVING: ICD-10-CM

## 2022-07-09 DIAGNOSIS — M15.9 PRIMARY OSTEOARTHRITIS INVOLVING MULTIPLE JOINTS: ICD-10-CM

## 2022-07-09 DIAGNOSIS — R26.0 ATAXIC GAIT: Primary | ICD-10-CM

## 2022-07-09 DIAGNOSIS — S22.42XA CLOSED FRACTURE OF MULTIPLE RIBS OF LEFT SIDE, INITIAL ENCOUNTER: ICD-10-CM

## 2022-07-09 DIAGNOSIS — Z78.9 IMPAIRED MOBILITY AND ACTIVITIES OF DAILY LIVING: ICD-10-CM

## 2022-07-09 DIAGNOSIS — N25.0 RENAL OSTEODYSTROPHY: ICD-10-CM

## 2022-07-09 PROBLEM — A41.9 SEPTICEMIA (HCC): Status: ACTIVE | Noted: 2022-07-09

## 2022-07-09 LAB
ALBUMIN SERPL-MCNC: 3.6 G/DL (ref 3.5–4.6)
ALBUMIN SERPL-MCNC: 3.9 G/DL (ref 3.5–4.6)
ANION GAP SERPL CALCULATED.3IONS-SCNC: 11 MEQ/L (ref 9–15)
ANION GAP SERPL CALCULATED.3IONS-SCNC: 15 MEQ/L (ref 9–15)
BUN BLDV-MCNC: 18 MG/DL (ref 8–23)
BUN BLDV-MCNC: 25 MG/DL (ref 8–23)
CALCIUM SERPL-MCNC: 10 MG/DL (ref 8.5–9.9)
CALCIUM SERPL-MCNC: 9.8 MG/DL (ref 8.5–9.9)
CHLORIDE BLD-SCNC: 91 MEQ/L (ref 95–107)
CHLORIDE BLD-SCNC: 92 MEQ/L (ref 95–107)
CO2: 26 MEQ/L (ref 20–31)
CO2: 27 MEQ/L (ref 20–31)
CREAT SERPL-MCNC: 2.79 MG/DL (ref 0.5–0.9)
CREAT SERPL-MCNC: 3.43 MG/DL (ref 0.5–0.9)
GFR AFRICAN AMERICAN: 16.3
GFR AFRICAN AMERICAN: 20.6
GFR NON-AFRICAN AMERICAN: 13.4
GFR NON-AFRICAN AMERICAN: 17.1
GLUCOSE BLD-MCNC: 116 MG/DL (ref 70–99)
GLUCOSE BLD-MCNC: 122 MG/DL (ref 70–99)
GLUCOSE BLD-MCNC: 126 MG/DL (ref 70–99)
GLUCOSE BLD-MCNC: 224 MG/DL (ref 70–99)
GLUCOSE BLD-MCNC: 249 MG/DL (ref 70–99)
HCT VFR BLD CALC: 27 % (ref 37–47)
HEMOGLOBIN: 9.8 G/DL (ref 12–16)
MAGNESIUM: 2 MG/DL (ref 1.7–2.4)
MCH RBC QN AUTO: 32.7 PG (ref 27–31.3)
MCHC RBC AUTO-ENTMCNC: 36.2 % (ref 33–37)
MCV RBC AUTO: 90.3 FL (ref 82–100)
PDW BLD-RTO: 14.3 % (ref 11.5–14.5)
PERFORMED ON: ABNORMAL
PHOSPHORUS: 3.3 MG/DL (ref 2.3–4.8)
PHOSPHORUS: 3.4 MG/DL (ref 2.3–4.8)
PLATELET # BLD: 267 K/UL (ref 130–400)
POTASSIUM SERPL-SCNC: 4.6 MEQ/L (ref 3.4–4.9)
POTASSIUM SERPL-SCNC: 4.7 MEQ/L (ref 3.4–4.9)
RBC # BLD: 2.99 M/UL (ref 4.2–5.4)
SARS-COV-2, NAAT: NOT DETECTED
SODIUM BLD-SCNC: 130 MEQ/L (ref 135–144)
SODIUM BLD-SCNC: 132 MEQ/L (ref 135–144)
WBC # BLD: 9.6 K/UL (ref 4.8–10.8)

## 2022-07-09 PROCEDURE — 94664 DEMO&/EVAL PT USE INHALER: CPT

## 2022-07-09 PROCEDURE — 99233 SBSQ HOSP IP/OBS HIGH 50: CPT | Performed by: PSYCHIATRY & NEUROLOGY

## 2022-07-09 PROCEDURE — 80069 RENAL FUNCTION PANEL: CPT

## 2022-07-09 PROCEDURE — 2500000003 HC RX 250 WO HCPCS: Performed by: INTERNAL MEDICINE

## 2022-07-09 PROCEDURE — 83735 ASSAY OF MAGNESIUM: CPT

## 2022-07-09 PROCEDURE — 36415 COLL VENOUS BLD VENIPUNCTURE: CPT

## 2022-07-09 PROCEDURE — 87635 SARS-COV-2 COVID-19 AMP PRB: CPT

## 2022-07-09 PROCEDURE — 6370000000 HC RX 637 (ALT 250 FOR IP): Performed by: PHYSICAL MEDICINE & REHABILITATION

## 2022-07-09 PROCEDURE — 1180000000 HC REHAB R&B

## 2022-07-09 PROCEDURE — 85027 COMPLETE CBC AUTOMATED: CPT

## 2022-07-09 PROCEDURE — 6370000000 HC RX 637 (ALT 250 FOR IP): Performed by: INTERNAL MEDICINE

## 2022-07-09 RX ORDER — DEXTROSE MONOHYDRATE 50 MG/ML
100 INJECTION, SOLUTION INTRAVENOUS PRN
Status: CANCELLED | OUTPATIENT
Start: 2022-07-09

## 2022-07-09 RX ORDER — INSULIN LISPRO 100 [IU]/ML
0-6 INJECTION, SOLUTION INTRAVENOUS; SUBCUTANEOUS NIGHTLY
Status: CANCELLED | OUTPATIENT
Start: 2022-07-09

## 2022-07-09 RX ORDER — ARIPIPRAZOLE 5 MG/1
5 TABLET ORAL DAILY
Status: DISCONTINUED | OUTPATIENT
Start: 2022-07-09 | End: 2022-07-20 | Stop reason: HOSPADM

## 2022-07-09 RX ORDER — HYDROXYZINE HYDROCHLORIDE 25 MG/1
25 TABLET, FILM COATED ORAL 3 TIMES DAILY PRN
Status: CANCELLED | OUTPATIENT
Start: 2022-07-09

## 2022-07-09 RX ORDER — ISOSORBIDE MONONITRATE 60 MG/1
120 TABLET, EXTENDED RELEASE ORAL DAILY
Status: CANCELLED | OUTPATIENT
Start: 2022-07-09

## 2022-07-09 RX ORDER — LANOLIN ALCOHOL/MO/W.PET/CERES
3 CREAM (GRAM) TOPICAL NIGHTLY PRN
Status: DISCONTINUED | OUTPATIENT
Start: 2022-07-09 | End: 2022-07-20 | Stop reason: HOSPADM

## 2022-07-09 RX ORDER — POLYETHYLENE GLYCOL 3350 17 G/17G
17 POWDER, FOR SOLUTION ORAL DAILY
Status: CANCELLED | OUTPATIENT
Start: 2022-07-09

## 2022-07-09 RX ORDER — ACETAMINOPHEN 650 MG/1
650 SUPPOSITORY RECTAL EVERY 6 HOURS PRN
Status: CANCELLED | OUTPATIENT
Start: 2022-07-09

## 2022-07-09 RX ORDER — ACETAMINOPHEN 325 MG/1
650 TABLET ORAL EVERY 6 HOURS PRN
Status: DISCONTINUED | OUTPATIENT
Start: 2022-07-09 | End: 2022-07-20 | Stop reason: HOSPADM

## 2022-07-09 RX ORDER — ATORVASTATIN CALCIUM 40 MG/1
40 TABLET, FILM COATED ORAL NIGHTLY
Status: CANCELLED | OUTPATIENT
Start: 2022-07-09

## 2022-07-09 RX ORDER — MIDODRINE HYDROCHLORIDE 5 MG/1
5 TABLET ORAL
Status: DISCONTINUED | OUTPATIENT
Start: 2022-07-11 | End: 2022-07-20 | Stop reason: HOSPADM

## 2022-07-09 RX ORDER — ATORVASTATIN CALCIUM 40 MG/1
40 TABLET, FILM COATED ORAL NIGHTLY
Status: DISCONTINUED | OUTPATIENT
Start: 2022-07-09 | End: 2022-07-20 | Stop reason: HOSPADM

## 2022-07-09 RX ORDER — POLYETHYLENE GLYCOL 3350 17 G/17G
17 POWDER, FOR SOLUTION ORAL DAILY PRN
Status: DISCONTINUED | OUTPATIENT
Start: 2022-07-09 | End: 2022-07-20 | Stop reason: HOSPADM

## 2022-07-09 RX ORDER — POLYETHYLENE GLYCOL 3350 17 G/17G
17 POWDER, FOR SOLUTION ORAL DAILY
Status: DISCONTINUED | OUTPATIENT
Start: 2022-07-09 | End: 2022-07-20 | Stop reason: HOSPADM

## 2022-07-09 RX ORDER — ALBUTEROL SULFATE 2.5 MG/3ML
2.5 SOLUTION RESPIRATORY (INHALATION) EVERY 4 HOURS PRN
Status: CANCELLED | OUTPATIENT
Start: 2022-07-09

## 2022-07-09 RX ORDER — INSULIN LISPRO 100 [IU]/ML
0-6 INJECTION, SOLUTION INTRAVENOUS; SUBCUTANEOUS NIGHTLY
Status: DISCONTINUED | OUTPATIENT
Start: 2022-07-09 | End: 2022-07-20 | Stop reason: HOSPADM

## 2022-07-09 RX ORDER — SERTRALINE HYDROCHLORIDE 25 MG/1
50 TABLET, FILM COATED ORAL NIGHTLY
Status: DISCONTINUED | OUTPATIENT
Start: 2022-07-09 | End: 2022-07-20 | Stop reason: HOSPADM

## 2022-07-09 RX ORDER — LANOLIN ALCOHOL/MO/W.PET/CERES
3 CREAM (GRAM) TOPICAL NIGHTLY PRN
Status: CANCELLED | OUTPATIENT
Start: 2022-07-09

## 2022-07-09 RX ORDER — ARIPIPRAZOLE 5 MG/1
5 TABLET ORAL DAILY
Status: CANCELLED | OUTPATIENT
Start: 2022-07-09

## 2022-07-09 RX ORDER — INSULIN LISPRO 100 [IU]/ML
0-12 INJECTION, SOLUTION INTRAVENOUS; SUBCUTANEOUS
Status: DISCONTINUED | OUTPATIENT
Start: 2022-07-10 | End: 2022-07-20 | Stop reason: HOSPADM

## 2022-07-09 RX ORDER — LIDOCAINE 4 G/G
3 PATCH TOPICAL DAILY
Status: CANCELLED | OUTPATIENT
Start: 2022-07-09

## 2022-07-09 RX ORDER — LANOLIN ALCOHOL/MO/W.PET/CERES
400 CREAM (GRAM) TOPICAL DAILY
Status: CANCELLED | OUTPATIENT
Start: 2022-07-09

## 2022-07-09 RX ORDER — HYDROXYZINE HYDROCHLORIDE 25 MG/1
25 TABLET, FILM COATED ORAL 3 TIMES DAILY PRN
Status: DISCONTINUED | OUTPATIENT
Start: 2022-07-09 | End: 2022-07-20 | Stop reason: HOSPADM

## 2022-07-09 RX ORDER — DEXTROSE MONOHYDRATE 50 MG/ML
100 INJECTION, SOLUTION INTRAVENOUS PRN
Status: DISCONTINUED | OUTPATIENT
Start: 2022-07-09 | End: 2022-07-20 | Stop reason: HOSPADM

## 2022-07-09 RX ORDER — INSULIN LISPRO 100 [IU]/ML
0-12 INJECTION, SOLUTION INTRAVENOUS; SUBCUTANEOUS
Status: CANCELLED | OUTPATIENT
Start: 2022-07-09

## 2022-07-09 RX ORDER — INSULIN GLARGINE 100 [IU]/ML
35 INJECTION, SOLUTION SUBCUTANEOUS NIGHTLY
Status: DISCONTINUED | OUTPATIENT
Start: 2022-07-09 | End: 2022-07-13

## 2022-07-09 RX ORDER — ASPIRIN 81 MG/1
81 TABLET ORAL DAILY
Status: CANCELLED | OUTPATIENT
Start: 2022-07-09

## 2022-07-09 RX ORDER — INSULIN GLARGINE 100 [IU]/ML
35 INJECTION, SOLUTION SUBCUTANEOUS NIGHTLY
Status: CANCELLED | OUTPATIENT
Start: 2022-07-09

## 2022-07-09 RX ORDER — MECOBALAMIN 5000 MCG
10 TABLET,DISINTEGRATING ORAL NIGHTLY
Status: CANCELLED | OUTPATIENT
Start: 2022-07-09

## 2022-07-09 RX ORDER — MIDODRINE HYDROCHLORIDE 5 MG/1
5 TABLET ORAL
Status: CANCELLED | OUTPATIENT
Start: 2022-07-11

## 2022-07-09 RX ORDER — MECOBALAMIN 5000 MCG
10 TABLET,DISINTEGRATING ORAL NIGHTLY
Status: DISCONTINUED | OUTPATIENT
Start: 2022-07-09 | End: 2022-07-20 | Stop reason: HOSPADM

## 2022-07-09 RX ORDER — ACETAMINOPHEN 325 MG/1
650 TABLET ORAL EVERY 6 HOURS PRN
Status: CANCELLED | OUTPATIENT
Start: 2022-07-09

## 2022-07-09 RX ORDER — ALBUTEROL SULFATE 2.5 MG/3ML
2.5 SOLUTION RESPIRATORY (INHALATION) EVERY 4 HOURS PRN
Status: DISCONTINUED | OUTPATIENT
Start: 2022-07-09 | End: 2022-07-20 | Stop reason: HOSPADM

## 2022-07-09 RX ORDER — ACETAMINOPHEN 650 MG/1
650 SUPPOSITORY RECTAL EVERY 6 HOURS PRN
Status: DISCONTINUED | OUTPATIENT
Start: 2022-07-09 | End: 2022-07-20 | Stop reason: HOSPADM

## 2022-07-09 RX ORDER — LIDOCAINE 4 G/G
3 PATCH TOPICAL DAILY
Status: DISCONTINUED | OUTPATIENT
Start: 2022-07-09 | End: 2022-07-20 | Stop reason: HOSPADM

## 2022-07-09 RX ORDER — ASPIRIN 81 MG/1
81 TABLET ORAL DAILY
Status: DISCONTINUED | OUTPATIENT
Start: 2022-07-09 | End: 2022-07-20 | Stop reason: HOSPADM

## 2022-07-09 RX ORDER — ISOSORBIDE MONONITRATE 60 MG/1
120 TABLET, EXTENDED RELEASE ORAL DAILY
Status: DISCONTINUED | OUTPATIENT
Start: 2022-07-09 | End: 2022-07-20 | Stop reason: HOSPADM

## 2022-07-09 RX ORDER — LANOLIN ALCOHOL/MO/W.PET/CERES
400 CREAM (GRAM) TOPICAL DAILY
Status: DISCONTINUED | OUTPATIENT
Start: 2022-07-09 | End: 2022-07-20 | Stop reason: HOSPADM

## 2022-07-09 RX ADMIN — INSULIN GLARGINE 35 UNITS: 100 INJECTION, SOLUTION SUBCUTANEOUS at 20:43

## 2022-07-09 RX ADMIN — Medication: at 20:31

## 2022-07-09 RX ADMIN — INSULIN LISPRO 2 UNITS: 100 INJECTION, SOLUTION INTRAVENOUS; SUBCUTANEOUS at 20:44

## 2022-07-09 RX ADMIN — ATORVASTATIN CALCIUM 40 MG: 40 TABLET, FILM COATED ORAL at 20:33

## 2022-07-09 RX ADMIN — MICONAZOLE NITRATE: 2 POWDER TOPICAL at 20:30

## 2022-07-09 RX ADMIN — ISOSORBIDE MONONITRATE 120 MG: 60 TABLET, EXTENDED RELEASE ORAL at 09:40

## 2022-07-09 RX ADMIN — Medication 400 MG: at 09:40

## 2022-07-09 RX ADMIN — ACETAMINOPHEN 325MG 650 MG: 325 TABLET ORAL at 01:30

## 2022-07-09 RX ADMIN — ASPIRIN 81 MG: 81 TABLET, COATED ORAL at 09:40

## 2022-07-09 RX ADMIN — Medication 10 MG: at 20:33

## 2022-07-09 RX ADMIN — SERTRALINE 50 MG: 25 TABLET, FILM COATED ORAL at 20:33

## 2022-07-09 RX ADMIN — ACETAMINOPHEN 650 MG: 325 TABLET ORAL at 20:31

## 2022-07-09 RX ADMIN — MICONAZOLE NITRATE: 2 POWDER TOPICAL at 09:40

## 2022-07-09 RX ADMIN — ARIPIPRAZOLE 5 MG: 5 TABLET ORAL at 09:47

## 2022-07-09 ASSESSMENT — ENCOUNTER SYMPTOMS
NAUSEA: 0
TROUBLE SWALLOWING: 0
BACK PAIN: 1
SHORTNESS OF BREATH: 0
CHOKING: 0
VOMITING: 0
COLOR CHANGE: 0
PHOTOPHOBIA: 0

## 2022-07-09 ASSESSMENT — PAIN SCALES - GENERAL
PAINLEVEL_OUTOF10: 3
PAINLEVEL_OUTOF10: 6

## 2022-07-09 ASSESSMENT — PAIN DESCRIPTION - LOCATION
LOCATION: BACK
LOCATION: HEAD

## 2022-07-09 ASSESSMENT — PAIN DESCRIPTION - ORIENTATION: ORIENTATION: RIGHT;LOWER

## 2022-07-09 ASSESSMENT — PAIN - FUNCTIONAL ASSESSMENT: PAIN_FUNCTIONAL_ASSESSMENT: PREVENTS OR INTERFERES SOME ACTIVE ACTIVITIES AND ADLS

## 2022-07-09 ASSESSMENT — PAIN DESCRIPTION - DESCRIPTORS: DESCRIPTORS: ACHING

## 2022-07-09 NOTE — DISCHARGE SUMMARY
Hospital Medicine Discharge Summary    Darry Frankel  :  1960  MRN:  [id number]    Admit date:  2022  Discharge date:  2022    Admitting Physician:  Kat Melendez DO  Primary Care Physician:  Tulio Valencia MD      Discharge Diagnoses:    Principal Problem:    MRSA bacteremia  Active Problems:    Impaired mobility and activities of daily living    Closed T11 fracture (HCC)    Encephalopathy acute    Sepsis due to Enterococcus [facility])    Local infection due to central venous catheter    DJD (degenerative joint disease), cervical    Closed head injury    Sarcopenia    Fall from standing    Chronic diastolic congestive heart failure (HCC)    Closed rib fracture    Dialysis patient [facility])    Multiple closed fractures of right lower extremity and ribs  Resolved Problems:    * No resolved hospital problems. *      Hospital Course:   Darry Frankel is a 57 y.o. female that was admitted and treated at [facility] for the following medical issues:      E.Faecalis BSI  - repeat blood cultures no growth  - NOELLE was negative per cardiology  - plan is to continue IV Vanco with dialysis x 3 weeks per ID    Acute right 10th and 11th rib fractures s/p fall  - pain control    Weakness, falls  - MRI brain was negative for acute findings  - MRI of the lumbar spine showed DDD, no significant stenosis  - MRI of the cervical spine showed severe stenosis  - conservative therapy per neurosurgery   - followed by neurology     ESRD on HD  - unable to have dialysis today due to clotted graft  - IR placed TDC on   - management per nephrology        Disposition - acute rehab         Patient was seen by the following consultants while admitted to [facility]:   Consults:  IP CONSULT TO NEUROLOGY  IP CONSULT TO NEPHROLOGY  IP CONSULT TO PHYSICAL MEDICINE REHAB  IP CONSULT TO PHARMACY  IP CONSULT TO INFECTIOUS DISEASES  IP CONSULT TO INTERVENTIONAL RADIOLOGY  IP CONSULT TO CARDIOLOGY  IP CONSULT TO INTERVENTIONAL RADIOLOGY  IP CONSULT TO NEUROSURGERY  IP CONSULT TO INFECTIOUS DISEASES  IP CONSULT TO NEPHROLOGY    Significant Diagnostic Studies:    IR FLUORO GUIDED CVA DEVICE PLMT/REPLACE/REMOVAL    Result Date: 7/8/2022  1. Successful placement of a central venous catheter with subcutaneous tunnel in the internal jugular vein for dialysis. Radiation dose to the patient was: 13.01 mGy Additional clinical data: Chronic kidney disease requiring dialysis Procedure: 1. Ultrasound guidance for vascular access. The ultrasound image of the blood vessel was saved to PACS. 2.   Fluoroscopic guidance for placement of a central line. 3.   Placement of a central venous line with subcutaneous tunnel using the left internal jugular vein. Body of Report: Informed and written consent was obtained from the patient following discussion of risks, benefits and alternatives to this procedure. The was patient placed supine on the angiographic table. The patient's neck and chest were then prepped and draped in  normal sterile fashion. A small amount of local lidocaine anesthesia was injected subcutaneously. Ultrasound was used to study the jugular vein we intended to use prior to accessing it. The vein was patent. The ultrasound image of the blood vessel was saved to PACS. Using ultrasound access, puncture was made of the left internal jugular vein using a 21 GA needle. A wire was advanced into the IVC, a short incision was made at the puncture site and serial dilatation performed. A 16 Mauritian peel-away sheath was then advanced into the superior vena cava/right atrial junction. Next an incision was made at the site of the subcutaneous tunnel, approximately 4 cm caudal to the venous access site. A subcutaneous tunnel was created from the tunnel site to the venous access site in a retrograde fashion along with the 15.5 Dutch dialysis catheter.  The data Dacron cuff was inserted through the tunnel and placed subcutaneously. The catheter was then advanced through the peel-away sheath, and sheath removed. The tip of the catheter lies at the SVC/right atrial junction. The line flushes and aspirates appropriately. The catheter lines were both flushed with 10 cc of normal saline, and then blocked with 100 units/cc heparin. The catheter was sutured to the skin with nylon suture, and sterile bandaging was placed. IR TUNNELED CVC PLACE WO SQ PORT/PUMP > 5 YEARS    Result Date: 7/8/2022  1. Successful placement of a central venous catheter with subcutaneous tunnel in the internal jugular vein for dialysis. Radiation dose to the patient was: 13.01 mGy Additional clinical data: Chronic kidney disease requiring dialysis Procedure: 1. Ultrasound guidance for vascular access. The ultrasound image of the blood vessel was saved to PACS. 2.   Fluoroscopic guidance for placement of a central line. 3.   Placement of a central venous line with subcutaneous tunnel using the left internal jugular vein. Body of Report: Informed and written consent was obtained from the patient following discussion of risks, benefits and alternatives to this procedure. The was patient placed supine on the angiographic table. The patient's neck and chest were then prepped and draped in  normal sterile fashion. A small amount of local lidocaine anesthesia was injected subcutaneously. Ultrasound was used to study the jugular vein we intended to use prior to accessing it. The vein was patent. The ultrasound image of the blood vessel was saved to PACS. Using ultrasound access, puncture was made of the left internal jugular vein using a 21 GA needle. A wire was advanced into the IVC, a short incision was made at the puncture site and serial dilatation performed. A 16 Bengali peel-away sheath was then advanced into the superior vena cava/right atrial junction.  Next an incision was made at the site of the subcutaneous tunnel, approximately 4 cm caudal to the venous access site. A subcutaneous tunnel was created from the tunnel site to the venous access site in a retrograde fashion along with the 15.5 German dialysis catheter. The data Dacron cuff was inserted through the tunnel and placed subcutaneously. The catheter was then advanced through the peel-away sheath, and sheath removed. The tip of the catheter lies at the SVC/right atrial junction. The line flushes and aspirates appropriately. The catheter lines were both flushed with 10 cc of normal saline, and then blocked with 100 units/cc heparin. The catheter was sutured to the skin with nylon suture, and sterile bandaging was placed. IR ULTRASOUND GUIDANCE VASCULAR ACCESS    Result Date: 7/8/2022  1. Successful placement of a central venous catheter with subcutaneous tunnel in the internal jugular vein for dialysis. Radiation dose to the patient was: 13.01 mGy Additional clinical data: Chronic kidney disease requiring dialysis Procedure: 1. Ultrasound guidance for vascular access. The ultrasound image of the blood vessel was saved to PACS. 2.   Fluoroscopic guidance for placement of a central line. 3.   Placement of a central venous line with subcutaneous tunnel using the left internal jugular vein. Body of Report: Informed and written consent was obtained from the patient following discussion of risks, benefits and alternatives to this procedure. The was patient placed supine on the angiographic table. The patient's neck and chest were then prepped and draped in  normal sterile fashion. A small amount of local lidocaine anesthesia was injected subcutaneously. Ultrasound was used to study the jugular vein we intended to use prior to accessing it. The vein was patent. The ultrasound image of the blood vessel was saved to PACS. Using ultrasound access, puncture was made of the left internal jugular vein using a 21 GA needle.   A wire was advanced into the IVC, a short incision was made have not addressed during your hospitalization. Discharging you from the hospital does not mean that your medical care ends here and now. You may still need additional work up, investigation, monitoring, and treatment to be handled from this point on by outside providers including your PCP, Marques Young MD , Specialists and other healthcare providers. Please review your list of discharge medications prior to resuming medications you might still have at home, as the medications you need to be taking, dosages or how often you must take them may have changed. For medication questions, contact your retail pharmacy and your PCP, Marques Young MD .     ** I STRONGLY RECOMMEND that you follow up with Marques Young MD within 3 to 5 days for a post hospitalization evaluation. This specific office visit is covered by your insurance, and is not the same as your annual doctor visit/ check up. This office visit is important, as it may prevent need for repeat and/or future hospitalizations. **    Your medical team at [facility] ([facility]) appreciates the opportunity to work with you to get well!     Sincerely,  Luann Solares MD

## 2022-07-09 NOTE — FLOWSHEET NOTE
Pt has been awake in her room all day up to the chair and and ambulating to the bathroom with roller walker. Pt keeps asking when she is going to rehab but can't answer that question yet. Pt has ate 100% for breakfast and lunch and fluid restriction maintained. Pt was tested for covid and sent to lab. Electronically signed by Rebecca Omer LPN on 0/0/4011 at 4:01 PM  Report given to rehab pt going to 248 Pt ate dinner and has used the bathroom . Electronically signed by Rebecca Omer LPN on 2/8/6705 at 7:71 PM

## 2022-07-09 NOTE — PROGRESS NOTES
[facility] EMERGENCY [facility] AT [place] Respiratory Therapy Evaluation   Current Order: albuterol q4prn     Home Regimen: yes      Ordering Physician: Rinku Aiken  Re-evaluation Date: 7/12     Diagnosis:impaired mobility     Patient Status: Stable / Unstable + Physician notified    The following MDI Criteria must be met in order to convert aerosol to MDI with spacer. If unable to meet, MDI will be converted to aerosol:  []  Patient able to demonstrate the ability to use MDI effectively  []  Patient alert and cooperative  []  Patient able to take deep breath with 5-10 second hold  []  Medication(s) available in this delivery method   []  Peak flow greater than or equal to 200 ml/min            Current Order Substituted To  (same drug, same frequency)   Aerosol to MDI [] Albuterol Sulfate 0.083% unit dose by aerosol Albuterol Sulfate MDI 2 puffs by inhalation with spacer    [] Levalbuterol 1.25 mg unit dose by aerosol Levalbuterol MDI 2 puffs by inhalation with spacer    [] Levalbuterol 0.63 mg unit dose by aerosol Levalbuterol MDI 2 puffs by inhalation with spacer    [] Ipratropium Bromide 0.02% unit dose by aerosol Ipratropium Bromide MDI 2 puffs by inhalation with spacer    [] Duoneb (Ipratropium + Albuterol) unit dose by aerosol Ipratropium MDI + Albuterol MDI 2 puffs by inhalation w/spacer   MDI to Aerosol [] Albuterol Sulfate MDI Albuterol Sulfate 0.083% unit dose by aerosol    [] Levalbuterol MDI 2 puffs by inhalation Levalbuterol 1.25 mg unit dose by aerosol    [] Ipratropium Bromide MDI by inhalation Ipratropium Bromide 0.02% unit dose by aerosol    [] Combivent (Ipratropium + Albuterol) MDI by inhalation Duoneb (Ipratropium + Albuterol) unit dose by aerosol       Treatment Assessment [Frequency/Schedule]:  Change frequency to: _no change_________________________________________________per Protocol, P&T, MEC      Points 0 1 2 3 4   Pulmonary Status  Non-Smoker  []   Smoking history   < 20 pack years  []   Smoking history  ?  20 pack years  []   Pulmonary Disorder  (acute or chronic)  [x]   Severe or Chronic w/ Exacerbation  []     Surgical Status No [x]   Surgeries     General []   Surgery Lower []   Abdominal Thoracic or []   Upper Abdominal Thoracic with  PulmonaryDisorder  []     Chest X-ray Clear/Not  Ordered     [x]  Chronic Changes  Results Pending  []  Infiltrates, atelectasis, pleural effusion, or edema  []  Infiltrates in more than one lobe []  Infiltrate + Atelectasis, &/or pleural effusion  []    Respiratory Pattern Regular,  RR = 12-20 [x]  Increased,  RR = 21-25 []  GONZALEZ, irregular,  or RR = 26-30 []  Decreased FEV1  or RR = 31-35 []  Severe SOB, use  of accessory muscles, or RR ? 35  []    Mental Status Alert, oriented,  Cooperative [x]  Confused but Follows commands []  Lethargic or unable to follow commands []  Obtunded  []  Comatose  []    Breath Sounds Clear to  auscultation  []  Decreased unilaterally or  in bases only [x]  Decreased  bilaterally  []  Crackles or intermittent wheezes []  Wheezes []    Cough Strong, Spontan., & nonproductive [x]  Strong,  spontaneous, &  productive []  Weak,  Nonproductive []  Weak, productive or  with wheezes []  No spontaneous  cough or may require suctioning []    Level of Activity Ambulatory []  Ambulatory w/ Assist  [x]  Non-ambulatory []  Paraplegic []  Quadriplegic []    Total    Score:__5_____     Triage Score:__5  ______      Tri       Triage:     1. (>20) Freq: Q3    2. (16-20) Freq: Q4   3. (11-15) Freq: QID & Albuterol Q2 PRN    4. (6-10) Freq: TID & Albuterol Q2 PRN    5. (0-5) Freq Q4prn

## 2022-07-09 NOTE — PROGRESS NOTES
Nephrology Progress Note  Vitals all good  Assessment:  ESRDX  Enterococcus infction  Tricuspid Sx  Remote CABGX   Hx Hepatitis-C  Anemia  Schizophrenia      Plan:dialysis planned 3x week M=W-F    Patient Active Problem List:     Atherosclerotic heart disease of native coronary artery with unspecified angina pectoris (HCC)     Schizophrenia, paranoid, chronic     Metabolic syndrome     Vitamin B 12 deficiency     Cerebral microvascular disease     Mixed hyperlipidemia     Other hammer toe (acquired)     Vitamin D insufficiency     Incontinence of urine     Diabetic nephropathy with proteinuria ([address]     Essential (primary) hypertension     History of type C viral hepatitis     Urinary incontinence due to cognitive impairment     History of seizures     Stented coronary artery-plan is to stay on Plavix indefinately per Dr Jeff Blackburn     Other specified diabetes mellitus with diabetic neuropathy, unspecified ([address]     Controlled type 2 diabetes mellitus with diabetic neuropathy, with long-term current use of insulin (HCC)     Hemiparesis, left (HCC)     Angina, class II (HCC)     Pain, unspecified     Tardive dyskinesia     Shortness of breath     Chronic diastolic congestive heart failure (HCC)     Sleep apnea, unspecified     Pulmonary hypertension, unspecified (HCC)     Class 2 severe obesity with serious comorbidity and body mass index (BMI) of 36.0 to 36.9 in adult [facility])     Edema     Closed supracondylar fracture of right humerus     Other chronic pain     Palliative care patient     Recurrent falls     Renal failure     Difficulty in walking     ESRD (end stage renal disease) on dialysis ([address]     Weakness     Other seizures (HCC)     Moderate persistent asthma without complication     [id number]     Post PTCA     Falls frequently     Chest wall contusion, left, initial encounter     Headache, unspecified     Paranoid schizophrenia ([address]     Compression fracture of spine ([address]     Closed rib fracture     Depression Chronic obstructive pulmonary disease (HCC)     Critical illness polyneuropathy ([facility])     Multiple closed fractures of ribs of right side     Nonrheumatic mitral valve regurgitation     Nonrheumatic tricuspid valve regurgitation     Need for extended care facility     Chronic pain of both shoulders     Hemodialysis-associated hypotension     Anginal chest pain at rest [facility])     Chest pain     Unstable angina ([facility])     NSTEMI (non-ST elevated myocardial infarction) ([facility])     CKD (chronic kidney disease) stage 4, GFR 15-29 ml/min ([facility])     Hyperkalemia     Impaired mobility and activities of daily living     Dialysis patient [facility])     Unspecified open wound of right upper arm, initial encounter     Multiple closed fractures of right lower extremity and ribs     Closed T11 fracture ([facility])     Encephalopathy acute     MRSA bacteremia     Sepsis due to Enterococcus ([facility]     Local infection due to central venous catheter     DJD (degenerative joint disease), cervical     Closed head injury     Sarcopenia     Fall from standing      Subjective:  Admit Date: 6/30/2022    Interval History: no issues    Medications:  Scheduled Meds:   lidocaine  3 patch TransDERmal Daily    polyethylene glycol  17 g Oral Daily    insulin lispro  0-12 Units SubCUTAneous TID WC    insulin lispro  0-6 Units SubCUTAneous Nightly    heparin (porcine)  2,000 Units IntraVENous Once    epoetin chadwick-epbx  10,000 Units SubCUTAneous Once    vancomycin (VANCOCIN) intermittent dosing (placeholder)   Other RX Placeholder    ARIPiprazole  5 mg Oral Daily    aspirin EC  81 mg Oral Daily    atorvastatin  40 mg Oral Nightly    insulin glargine  35 Units SubCUTAneous Nightly    isosorbide mononitrate  120 mg Oral Daily    magnesium oxide  400 mg Oral Daily    melatonin  10 mg Oral Nightly    midodrine  5 mg Oral Once per day on Mon Wed Fri    miconazole   Topical BID    sertraline  50 mg Oral Nightly     Continuous Infusions:   dextrose CBC:   Recent Labs     07/08/22  0632 07/09/22  0527   WBC 8.4 9.6   HGB 9.4* 9.8*    267     CMP:    Recent Labs     07/07/22  0547 07/08/22  0632 07/09/22  0527   * 133* 130*   K 4.4 5.1* 4.6   CL 96 96 92*   CO2 27 23 27   BUN 24* 30* 18   CREATININE 3.70* 4.05* 2.79*   GLUCOSE 66* 82 122*   CALCIUM 9.6 10.2* 10.0*   LABGLOM 12.3* 11.1* 17.1*     Troponin: No results for input(s): TROPONINI in the last 72 hours. BNP: No results for input(s): BNP in the last 72 hours. INR: No results for input(s): INR in the last 72 hours. Lipids: No results for input(s): CHOL, LDLDIRECT, TRIG, HDL, AMYLASE, LIPASE in the last 72 hours. Liver:   Recent Labs     07/09/22 0527   LABALBU 3.6     Iron:  No results for input(s): IRONS, FERRITIN in the last 72 hours. Invalid input(s): LABIRONS  Urinalysis: No results for input(s): UA in the last 72 hours.     Objective:  Vitals: BP (!) 126/48   Pulse 80   Temp 97.9 °F (36.6 °C)   Resp 18   Ht 5' 7\" (1.702 m)   Wt 189 lb 9.5 oz (86 kg)   LMP  (LMP Unknown)   SpO2 94%   BMI 29.69 kg/m²    Wt Readings from Last 3 Encounters:   07/08/22 189 lb 9.5 oz (86 kg)   06/22/22 217 lb (98.4 kg)   06/06/22 215 lb 9.8 oz (97.8 kg)      24HR INTAKE/OUTPUT:      Intake/Output Summary (Last 24 hours) at 7/9/2022 0940  Last data filed at 7/8/2022 1055  Gross per 24 hour   Intake 400 ml   Output 2400 ml   Net -2000 ml       General: alert, in no apparent distress  HEENT: normocephalic, atraumatic, anicteric  Neck: supple, no mass  Lungs: non-labored respirations, clear to auscultation bilaterally  Heart: regular rate and rhythm, no murmurs or rubs  Abdomen: soft, non-tender, non-distended  Ext: no cyanosis, no peripheral edema  Neuro: alert and oriented, no gross abnormalities  Psych: normal mood and affect  Skin: no rash      Electronically signed by Kira Ochoa DO, MD

## 2022-07-09 NOTE — PROGRESS NOTES
1354    prochlorperazine (COMPAZINE) injection 10 mg  10 mg IntraVENous TID PRN Sophia Ree, DO   10 mg at 07/08/22 1255    albuterol (PROVENTIL) nebulizer solution 2.5 mg  2.5 mg Nebulization Q4H PRN Sophia Ree, DO        ARIPiprazole (ABILIFY) tablet 5 mg  5 mg Oral Daily Sophia Ree, DO   5 mg at 07/09/22 0947    aspirin EC tablet 81 mg  81 mg Oral Daily Sophia Ree, DO   81 mg at 07/09/22 0940    atorvastatin (LIPITOR) tablet 40 mg  40 mg Oral Nightly Sophia Ree, DO   40 mg at 07/08/22 2017    hydrOXYzine HCl (ATARAX) tablet 25 mg  25 mg Oral TID PRN Sophia Ree, DO        insulin glargine (LANTUS) injection vial 35 Units  35 Units SubCUTAneous Nightly Sophia Poweller, DO   35 Units at 07/08/22 2017    isosorbide mononitrate (IMDUR) extended release tablet 120 mg  120 mg Oral Daily Sophia Ree, DO   120 mg at 07/09/22 0940    magnesium oxide (MAG-OX) tablet 400 mg  400 mg Oral Daily Sophia Ree, DO   400 mg at 07/09/22 0940    melatonin disintegrating tablet 10 mg  10 mg Oral Nightly Sophia Ree, DO   10 mg at 07/08/22 2017    midodrine (PROAMATINE) tablet 5 mg  5 mg Oral Once per day on Mon Wed Fri Roxy Hendrix, DO   5 mg at 07/08/22 1115    miconazole (MICOTIN) 2 % powder   Topical BID Sophia Ree, DO   Given at 07/09/22 0940    sertraline (ZOLOFT) tablet 50 mg  50 mg Oral Nightly Osphia Ree, DO   50 mg at 07/08/22 2017    glucose chewable tablet 16 g  4 tablet Oral PRN Sophia Ree, DO        dextrose bolus 10% 125 mL  125 mL IntraVENous PRN Sophia Ree, DO        Or    dextrose bolus 10% 250 mL  250 mL IntraVENous PRN Sophia Ree, DO        glucagon (rDNA) injection 1 mg  1 mg IntraMUSCular PRN Sophia Ree, DO        dextrose 5 % solution  100 mL/hr IntraVENous PRN Sophia Keenan,            PHYSICAL EXAM:    BP (!) 126/48   Pulse 80   Temp 97.9 °F (36.6 °C)   Resp 18   Ht 5' 7\" (1.702 m)   Wt 189 lb 9.5 oz (86 kg)   LMP  (LMP Unknown)   SpO2 94% BMI 29.69 kg/m²    General Appearance:      Skin:  normal  CVS - Normal sounds, No murmurs , No carotid Bruits  RS -CTA  Abdomen Soft, BS present  Review of Systems   Constitutional: Negative for fever. HENT: Negative for ear pain, tinnitus and trouble swallowing. Eyes: Negative for photophobia and visual disturbance. Respiratory: Negative for choking and shortness of breath. Cardiovascular: Negative for chest pain and palpitations. Gastrointestinal: Negative for nausea and vomiting. Musculoskeletal: Positive for arthralgias, back pain, gait problem and myalgias. Negative for joint swelling, neck pain and neck stiffness. Skin: Negative for color change. Allergic/Immunologic: Negative for food allergies. Neurological: Positive for weakness. Negative for dizziness, tremors, seizures, syncope, facial asymmetry, speech difficulty, light-headedness, numbness and headaches. Psychiatric/Behavioral: Negative for behavioral problems, confusion, hallucinations and sleep disturbance.      Mental Status Exam:             Level of Alertness:   awake            Orientation:   person, place, time                    Attention/Concentration:  normal            Language:  normal      Funduscopic Exam:     Cranial Nerves            Cranial nerve III           Pupils:  equal, round, reactive to light      Cranial nerves III, IV, VI           Extraocular Movements: intact      Cranial nerve V           Facial sensation:  intact      Cranial nerve VII           Facial strength: intact      Cranial nerve VIII           Hearing:  intact      Cranial nerve IX           Palate:  intact      Cranial nerve XI         Shoulder shrug:  intact      Cranial nerve XII          Tongue movement:  normal    Motor: No extremity strength of 4/ 5 with areflexia            Sensory:        Pinprick             Right Upper Extremity:  normal             Left Upper Extremity:  normal             Right Lower Extremity:  normal Left Lower Extremity:  normal           Vibration                         Touch            Proprioception                 Coordination:           Finger/Nose   Right:  normal              Left:  normal          Heel-Knee-Shin                Right:  normal              Left:  normal          Rapid Alternating Movements              Right:  normal              Left:  normal          Gait:                       Casual: proximal muscle weakness is notable upon standing reflexes:             Deep Tendon Reflexes:             Reflexes are 2 +             Plantar response:                Right:  downgoing     Patient very much unchanged   patient still transferring and not ambulating       left:  downgoing    Vascular:  Cardiac Exam:  normal         FL MODIFIED BARIUM SWALLOW W VIDEO    Result Date: 7/1/2022  FL MODIFIED BARIUM SWALLOW W VIDEO : 7/1/2022 CLINICAL HISTORY:  Choking with meal, concern for aspiration/dysphagia . COMPARISON: 5/10/2013. TECHNIQUE: Lateral videofluoroscopy was provided during speech therapy evaluation during ingestion of various barium liquids and semisolids. A total of 1.6 minutes of fluoroscopy time was used, with a total of  10 fluoroscopic series and one still saved. No diagnostic images were saved. Oral contrast:  50 mL BaSO4 FINDINGS: Mild to moderate oral dysphagia is observed with premature entry into the piriform sinuses and mild to moderate oral residual that cleared with independent re-swallowing. Endovaginal laryngeal penetration was observed with thin and soft barium consistencies that immediately cleared. Mild to moderate residual within the vallecula cleared with recent swallowing. No tracheal aspiration was identified. A full report will be made by the speech pathology team.     MILD TO MODERATELY ABNORMAL MODIFIED BARIUM SWALLOW. NO TRACHEAL ASPIRATION IDENTIFIED.  A FULL REPORT WILL BE MADE BY THE SPEECH PATHOLOGY TEAM.       Recent Labs     07/07/22  3122 07/08/22  3344 07/09/22  0527   WBC 9.0 8.4 9.6   HGB 9.6* 9.4* 9.8*    255 267     Recent Labs     07/07/22  0547 07/08/22  0632 07/09/22 0527   * 133* 130*   K 4.4 5.1* 4.6   CL 96 96 92*   CO2 27 23 27   BUN 24* 30* 18   CREATININE 3.70* 4.05* 2.79*   GLUCOSE 66* 82 122*     No results for input(s): BILITOT, ALKPHOS, AST, ALT in the last 72 hours. Lab Results   Component Value Date/Time    PROTIME 16.3 06/30/2022 07:30 AM    INR 1.3 06/30/2022 07:30 AM     No results found for: LITHIUM, DILFRTOT, VALPROATE    ASSESSMENT AND PLAN  With ataxia with falls with lower extremity weakness. A lumbar canal stenosis must be ruled out. Recommend an MRI of the lumbosacral spine and the brain given that she has underlying bacteremia to make sure this is not endocarditis. She also has peripheral neuropathy to explain her lower extremity weakness as well had findings discussed. Exam as noted above. Patient has some ataxia and lower extremity weakness. We had recommended a lumbar spine evaluation with an MRI with an MRI I of the brain to make sure there is no septic emboli given underlying blood cultures. MRI is pending. Complain of knee pain which may be causing some degree with difficult ambulation. Exam remains unchanged with lower extremity weakness and back pain with areflexia. MRI was not done as there appeared to be some issues about stents and cardiology has been consulted for the same. Will await for the MRI prior to any other recommendations    7/5/22:   MRI of the brain negative for acute findings  MRI of the lumbar spine with degenerative changes and no significant canal stenosis. Localizer identified high-grade canal stenosis of the cervical spine. Will obtain dedicated cervical spine MRI. Patient with sepsis with Enterococcus bacteremia. Infected dialysis catheter. Continues on vancomycin. NOELLE today.     I have personally performed a face to face diagnostic evaluation on this patient, reviewed all data and investigations, and am the sole provider of all clinical decisions on the neurological status of this patient. Patient was examined and unchanged examination. MRI was reviewed and is negative for any acute bleeds. MRI of the lumbar spine did not show stenosis. On the localizer there was some question of cervical spinal stenosis and will obtain a dedicated MRI of the cervical spine. MRI reviewed in person and patient examined and within 60% time spent in evaluating and managing this patient myself. I have personally performed a face to face diagnostic evaluation on this patient, reviewed all data and investigations, and am the sole provider of all clinical decisions on the neurological status of this patient. Semination as noted above patient still continues to lower EXTR weakness but I do not see any myelopathic signs. MRI of the cervical spine is still pending. Patient has no session of septic emboli. Findings were discussed with the patient NOELLE is pending. More than 60% time spent on evaluating and managing this patient by myself      7/6/2022:  MRI of the cervical spine remains pending. NOELLE negative  Rehab eval pending    7/7/22:  Gait ataxia with falls. MRI of the cervical spine showed moderate to marked central canal stenosis from C4-5 through C6-7. We will consult neurosurgery. I have personally performed a face to face diagnostic evaluation on this patient, reviewed all data and investigations, and am the sole provider of all clinical decisions on the neurological status of this patient. It is noted and patient has significant stenosis of C5-C6-C7. This explains patient gait issues but is not hyperreflexic due to peripheral neuropathy. This was seen on the markers and therefore evaluated. Details of this are discussed with the patient and we will see if she is surgical or not. More than 60% time spent on evaluating and explaining to the patient.       7/8/2022:  Awaiting neurosurgery recommendations  7/9  Neurosurgical consultation was noted and in the face of significant spinal canal stenosis cervical level she is not a candidate for surgical intervention due to other comorbidities. She likely may be very risky for surgery and therefore conservative management is recommended and patient will be discharged. Endings discussed with the patient and expectation of her management. Darren Solomon MD, Dewayne Hanson, American Board of Psychiatry & Neurology  Board Certified in Vascular Neurology  Board Certified in Neuromuscular Medicine  Certified in . [address]

## 2022-07-09 NOTE — PROGRESS NOTES
Hospitalist Progress Note      PCP: Jeff Christianson MD    Date of Admission: 6/30/2022    Chief Complaint:  No acute events, afebrile, stable HD    Medications:  Reviewed    Infusion Medications    dextrose       Scheduled Medications    lidocaine  3 patch TransDERmal Daily    polyethylene glycol  17 g Oral Daily    insulin lispro  0-12 Units SubCUTAneous TID WC    insulin lispro  0-6 Units SubCUTAneous Nightly    heparin (porcine)  2,000 Units IntraVENous Once    epoetin chadwick-epbx  10,000 Units SubCUTAneous Once    vancomycin (VANCOCIN) intermittent dosing (placeholder)   Other RX Placeholder    ARIPiprazole  5 mg Oral Daily    aspirin EC  81 mg Oral Daily    atorvastatin  40 mg Oral Nightly    insulin glargine  35 Units SubCUTAneous Nightly    isosorbide mononitrate  120 mg Oral Daily    magnesium oxide  400 mg Oral Daily    melatonin  10 mg Oral Nightly    midodrine  5 mg Oral Once per day on Mon Wed Fri    miconazole   Topical BID    sertraline  50 mg Oral Nightly     PRN Meds: camphor-menthol-methyl salicylate, melatonin, acetaminophen **OR** acetaminophen, prochlorperazine, albuterol, hydrOXYzine HCl, glucose, dextrose bolus **OR** dextrose bolus, glucagon (rDNA), dextrose    No intake or output data in the 24 hours ending 07/09/22 1128    Exam:    BP (!) 126/48   Pulse 80   Temp 97.9 °F (36.6 °C)   Resp 18   Ht 5' 7\" (1.702 m)   Wt 189 lb 9.5 oz (86 kg)   LMP  (LMP Unknown)   SpO2 94%   BMI 29.69 kg/m²     General appearance: appears stated age and cooperative. Respiratory:  clear to auscultation bilaterally  Cardiovascular: Regular rate and rhythm, S1/S2  Abdomen: Soft, active bowel sounds. Musculoskeletal: No edema bilaterally.       Labs:   Recent Labs     07/07/22 0547 07/08/22  0632 07/09/22  0527   WBC 9.0 8.4 9.6   HGB 9.6* 9.4* 9.8*   HCT 26.6* 26.6* 27.0*    255 267     Recent Labs     07/07/22  0547 07/08/22  0632 07/09/22  0527   * 133* 130*   K 4.4 5.1* 4.6   CL 96 96 92*   CO2 27 23 27   BUN 24* 30* 18   CREATININE 3.70* 4.05* 2.79*   CALCIUM 9.6 10.2* 10.0*   PHOS 3.0 3.9 3.3     No results for input(s): AST, ALT, BILIDIR, BILITOT, ALKPHOS in the last 72 hours. No results for input(s): INR in the last 72 hours. No results for input(s): Yvan Patel in the last 72 hours. Urinalysis:      Lab Results   Component Value Date/Time    NITRU Negative 04/20/2022 10:45 AM    WBCUA >100 04/20/2022 10:45 AM    BACTERIA MANY 04/20/2022 10:45 AM    RBCUA 3-5 04/20/2022 10:45 AM    BLOODU TRACE 04/20/2022 10:45 AM    SPECGRAV 1.014 04/20/2022 10:45 AM    GLUCOSEU Negative 04/20/2022 10:45 AM       Radiology:  IR FLUORO GUIDED CVA DEVICE PLMT/REPLACE/REMOVAL   Final Result      1. Successful placement of a central venous catheter with subcutaneous tunnel in the internal jugular vein for dialysis. Radiation dose to the patient was: 13.01 mGy      Additional clinical data: Chronic kidney disease requiring dialysis      Procedure:   1. Ultrasound guidance for vascular access. The ultrasound image of the blood vessel was saved to PACS. 2.   Fluoroscopic guidance for placement of a central line. 3.   Placement of a central venous line with subcutaneous tunnel using the left internal jugular vein. Body of Report: Informed and written consent was obtained from the patient following discussion of risks, benefits and alternatives to this procedure. The was patient placed supine on the angiographic table. The patient's neck and chest were then prepped and draped in    normal sterile fashion. A small amount of local lidocaine anesthesia was injected subcutaneously. Ultrasound was used to study the jugular vein we intended to use prior to accessing it. The vein was patent. The ultrasound image of the blood vessel was saved to PACS. Using ultrasound access, puncture was made of the left internal jugular vein using    a 21 GA needle.   A wire was advanced into the IVC, a short incision was made at the puncture site and serial dilatation performed. A 16 Czech peel-away sheath was then advanced into the superior vena cava/right atrial junction. Next an incision was made at the site of the subcutaneous tunnel, approximately 4 cm caudal to the venous access site. A subcutaneous tunnel was created from the tunnel site to the venous access site in a retrograde fashion along with the 15.5 [place]    dialysis catheter. The data Dacron cuff was inserted through the tunnel and placed subcutaneously. The catheter was then advanced through the peel-away sheath, and sheath removed. The tip of the catheter lies at the SVC/right atrial junction. The line    flushes and aspirates appropriately. The catheter lines were both flushed with 10 cc of normal saline, and then blocked with 100 units/cc heparin. The catheter was sutured to the skin with nylon suture, and sterile bandaging was placed. IR ULTRASOUND GUIDANCE VASCULAR ACCESS   Final Result      1. Successful placement of a central venous catheter with subcutaneous tunnel in the internal jugular vein for dialysis. Radiation dose to the patient was: 13.01 mGy      Additional clinical data: Chronic kidney disease requiring dialysis      Procedure:   1. Ultrasound guidance for vascular access. The ultrasound image of the blood vessel was saved to PACS. 2.   Fluoroscopic guidance for placement of a central line. 3.   Placement of a central venous line with subcutaneous tunnel using the left internal jugular vein. Body of Report: Informed and written consent was obtained from the patient following discussion of risks, benefits and alternatives to this procedure. The was patient placed supine on the angiographic table. The patient's neck and chest were then prepped and draped in    normal sterile fashion.   A small amount of local lidocaine anesthesia was injected subcutaneously. Ultrasound was used to study the jugular vein we intended to use prior to accessing it. The vein was patent. The ultrasound image of the blood vessel was saved to PACS. Using ultrasound access, puncture was made of the left internal jugular vein using    a 21 GA needle. A wire was advanced into the IVC, a short incision was made at the puncture site and serial dilatation performed. A 16 Amharic peel-away sheath was then advanced into the superior vena cava/right atrial junction. Next an incision was made at the site of the subcutaneous tunnel, approximately 4 cm caudal to the venous access site. A subcutaneous tunnel was created from the tunnel site to the venous access site in a retrograde fashion along with the 15.5 [place]    dialysis catheter. The data Dacron cuff was inserted through the tunnel and placed subcutaneously. The catheter was then advanced through the peel-away sheath, and sheath removed. The tip of the catheter lies at the SVC/right atrial junction. The line    flushes and aspirates appropriately. The catheter lines were both flushed with 10 cc of normal saline, and then blocked with 100 units/cc heparin. The catheter was sutured to the skin with nylon suture, and sterile bandaging was placed. XR KNEE LEFT (3 VIEWS)   Final Result      NO DISPLACED FRACTURE OR SIGNIFICANT POSTTRAUMATIC COMPLICATION IDENTIFIED. MRI CERVICAL SPINE WO CONTRAST   Final Result      MODERATELY EXTENSIVE CERVICAL SPONDYLOSIS, AS DESCRIBED IN DETAIL. MODERATE TO MARKED CENTRAL SPINAL STENOSIS AND MODERATE NEURAL FORAMINAL NARROWING FROM C4-5 THROUGH C6-C7 LEVELS. MODERATE CENTRAL SPINAL STENOSIS AND MILD NEURAL FORAMINAL NARROWING AT C3-4. MILD TO MODERATE LEFT NEURAL FORAMINAL NARROWING C7-T1 AND T1-2. MRI LUMBAR SPINE WO CONTRAST   Final Result      Degenerative changes of the lumbar spine as detailed.       Localizer images demonstrate cervical spine degenerative changes with apparent multilevel high-grade spinal canal stenosis. MRI BRAIN WO CONTRAST   Final Result      No acute ischemia or acute intracranial process. Generalized parenchymal volume loss and nonspecific white matter findings most compatible with chronic small vessel ischemic changes in a patient of this age. IR REMOVE TUNNELED CVAD WO SQ PORT/PUMP   Final Result   1. Successful removal of a tunneled internal jugular vein dialysis catheter. DIAGNOSIS: MRSA bacteremia       RADIATION DOSE: 0.25 mGy         FINDINGS:      Patient was placed supine on the procedure table. The chest and neck including the existing tunneled catheter were prepped and draped in sterile fashion. Lidocaine was used to anesthetize the skin tunnel site. Hemostats were used to dissect the Dacron    anchor cuff from the surrounding tissue. Under gentle traction and while holding pressure at the neck venotomy site, the catheter was easily removed. The catheter was checked for any missing components, catheter was intact. Pressure was held at the neck    until hemostasis was achieved. Sterile dressing was applied. Patient tolerated the procedure well without any complications. Patient was discharged home in normal and stable condition. FL MODIFIED BARIUM SWALLOW W VIDEO   Final Result      MILD TO MODERATELY ABNORMAL MODIFIED BARIUM SWALLOW. NO TRACHEAL ASPIRATION IDENTIFIED. A FULL REPORT WILL BE MADE BY THE SPEECH PATHOLOGY TEAM.               XR CHEST PORTABLE   Final Result   Findings may represent mild CHF. It is not significantly change from previous study. CT CHEST WO CONTRAST   Final Result      MINIMALLY DISPLACED ACUTE RIGHT 10TH AND 11TH RIB FRACTURES. NO OTHER ACUTE FRACTURE OR SIGNIFICANT POSTTRAUMATIC COMPLICATION IDENTIFIED.                XR CHEST (2 VW)   Final Result   NO SIGNIFICANT CHANGE SINCE THE PREVIOUS EXAM. THERE HAS BEEN PLACEMENT OF A DIALYSIS CATHETER SINCE THAT EXAM.         CT HEAD WO CONTRAST   Final Result      NO ACUTE INTRACRANIAL PROCESS OR SIGNIFICANT CHANGE FROM 10/11/2021 IDENTIFIED. CT CERVICAL SPINE WO CONTRAST   Final Result   SIGNIFICANT DEGENERATIVE ARTHRITIS. NO FRACTURE IS SEEN. CT THORACIC SPINE WO CONTRAST   Final Result   OLD COMPRESSION FRACTURE OF T11. AN ACUTE FRACTURE IS NOT SEEN. BILATERAL PLEURAL EFFUSIONS. CT LUMBAR SPINE WO CONTRAST   Final Result   MILD DEGENERATIVE ARTHRITIS. NO FRACTURE. CT ABDOMEN PELVIS WO CONTRAST Additional Contrast? None   Final Result      MINIMALLY DISPLACED ACUTE FRACTURES OF THE POSTERIOR RIGHT 10TH AND 11TH RIBS. NO OTHER SIGNIFICANT RECENT POSTTRAUMATIC COMPLICATION IDENTIFIED. CHRONIC FINDINGS, NOT SIGNIFICANTLY CHANGED FROM 4/20/2022. Assessment/Plan:    57year-old female with history of CAD s/p CABG/PCI, s/p TV repair, CVA with left sided weakness, IDDM2, ESRD on HD, anemia,  schizophrenia, COVID-19 pneumonia, obesity, T11 fracture who presented with weakness and falls.      E.Faecalis BSI  - repeat blood cultures and dialysis catheter tip no growth  - on IV Vanco  - NOELLE was negative per cardiology  - management per ID    Acute right 10th and 11th rib fractures s/p fall  - pain control    Weakness, falls  - MRI brain was negative for acute findings  - MRI of the lumbar spine showed DDD, no significant stenosis  - MRI of the cervical spine showed severe stenosis  - conservative therapy per neurosurgery   - followed by neurology     ESRD on HD  - unable to have dialysis today due to clotted graft  - IR placed TDC on 7/7  - management per nephrology    CAD s/p CABG/PCI  - on ASA, Lipitor therapy  - followed by cardiology    IDDM2 with hyperglycemia  - on ISS    Anemia  - stable H/H    Thrombocytopenia  - resolved    Schizophrenia  - on Abilify    Severe malnutrition  - Diet: ADULT ORAL NUTRITION SUPPLEMENT; Breakfast, Lunch, Dinner; Diabetic Oral Supplement  ADULT DIET;  Regular; 1800 ml   - followed by dietitian    Code Status: Full Code      Disposition - acute rehab today        Electronically signed by Author MD Sravani on 7/9/2022 at 11:28 AM

## 2022-07-10 PROBLEM — B18.2 CHRONIC HEPATITIS C (HCC): Status: ACTIVE | Noted: 2022-07-10

## 2022-07-10 LAB
BACTERIA: NEGATIVE /HPF
BILIRUBIN URINE: NEGATIVE
BLOOD, URINE: NEGATIVE
CLARITY: CLEAR
COLOR: YELLOW
EPITHELIAL CELLS, UA: ABNORMAL /HPF (ref 0–5)
GLUCOSE BLD-MCNC: 158 MG/DL (ref 70–99)
GLUCOSE BLD-MCNC: 204 MG/DL (ref 70–99)
GLUCOSE BLD-MCNC: 220 MG/DL (ref 70–99)
GLUCOSE BLD-MCNC: 255 MG/DL (ref 70–99)
GLUCOSE URINE: 100 MG/DL
HCT VFR BLD CALC: 26.9 % (ref 37–47)
HEMOGLOBIN: 9.7 G/DL (ref 12–16)
HYALINE CASTS: ABNORMAL /HPF (ref 0–5)
KETONES, URINE: NEGATIVE MG/DL
LEUKOCYTE ESTERASE, URINE: NEGATIVE
MCH RBC QN AUTO: 32.7 PG (ref 27–31.3)
MCHC RBC AUTO-ENTMCNC: 36.1 % (ref 33–37)
MCV RBC AUTO: 90.6 FL (ref 82–100)
NITRITE, URINE: NEGATIVE
PDW BLD-RTO: 14.2 % (ref 11.5–14.5)
PERFORMED ON: ABNORMAL
PH UA: 8 (ref 5–9)
PLATELET # BLD: 278 K/UL (ref 130–400)
PROTEIN UA: 30 MG/DL
RBC # BLD: 2.97 M/UL (ref 4.2–5.4)
RBC UA: ABNORMAL /HPF (ref 0–5)
SPECIFIC GRAVITY UA: 1.01 (ref 1–1.03)
UROBILINOGEN, URINE: 1 E.U./DL
WBC # BLD: 9.4 K/UL (ref 4.8–10.8)
WBC UA: ABNORMAL /HPF (ref 0–5)

## 2022-07-10 PROCEDURE — 6360000002 HC RX W HCPCS: Performed by: PHYSICAL MEDICINE & REHABILITATION

## 2022-07-10 PROCEDURE — 92523 SPEECH SOUND LANG COMPREHEN: CPT

## 2022-07-10 PROCEDURE — 6360000002 HC RX W HCPCS: Performed by: NURSE PRACTITIONER

## 2022-07-10 PROCEDURE — 97166 OT EVAL MOD COMPLEX 45 MIN: CPT

## 2022-07-10 PROCEDURE — 99222 1ST HOSP IP/OBS MODERATE 55: CPT | Performed by: INTERNAL MEDICINE

## 2022-07-10 PROCEDURE — 87086 URINE CULTURE/COLONY COUNT: CPT

## 2022-07-10 PROCEDURE — 99232 SBSQ HOSP IP/OBS MODERATE 35: CPT | Performed by: PHYSICAL MEDICINE & REHABILITATION

## 2022-07-10 PROCEDURE — 85027 COMPLETE CBC AUTOMATED: CPT

## 2022-07-10 PROCEDURE — 36415 COLL VENOUS BLD VENIPUNCTURE: CPT

## 2022-07-10 PROCEDURE — 6370000000 HC RX 637 (ALT 250 FOR IP): Performed by: INTERNAL MEDICINE

## 2022-07-10 PROCEDURE — 97162 PT EVAL MOD COMPLEX 30 MIN: CPT

## 2022-07-10 PROCEDURE — 6370000000 HC RX 637 (ALT 250 FOR IP): Performed by: PHYSICAL MEDICINE & REHABILITATION

## 2022-07-10 PROCEDURE — 92610 EVALUATE SWALLOWING FUNCTION: CPT

## 2022-07-10 PROCEDURE — 96125 COGNITIVE TEST BY HC PRO: CPT

## 2022-07-10 PROCEDURE — 1180000000 HC REHAB R&B

## 2022-07-10 PROCEDURE — 81001 URINALYSIS AUTO W/SCOPE: CPT

## 2022-07-10 PROCEDURE — 5A1D70Z PERFORMANCE OF URINARY FILTRATION, INTERMITTENT, LESS THAN 6 HOURS PER DAY: ICD-10-PCS | Performed by: INTERNAL MEDICINE

## 2022-07-10 RX ORDER — VITAMIN B COMPLEX
2000 TABLET ORAL
Status: DISCONTINUED | OUTPATIENT
Start: 2022-07-10 | End: 2022-07-20 | Stop reason: HOSPADM

## 2022-07-10 RX ORDER — CYANOCOBALAMIN 1000 UG/ML
1000 INJECTION INTRAMUSCULAR; SUBCUTANEOUS WEEKLY
Status: DISCONTINUED | OUTPATIENT
Start: 2022-07-10 | End: 2022-07-20 | Stop reason: HOSPADM

## 2022-07-10 RX ORDER — UBIDECARENONE 100 MG
100 CAPSULE ORAL DAILY
Status: DISCONTINUED | OUTPATIENT
Start: 2022-07-10 | End: 2022-07-20 | Stop reason: HOSPADM

## 2022-07-10 RX ORDER — HEPARIN SODIUM 1000 [USP'U]/ML
2000 INJECTION, SOLUTION INTRAVENOUS; SUBCUTANEOUS ONCE
Status: COMPLETED | OUTPATIENT
Start: 2022-07-11 | End: 2022-07-12

## 2022-07-10 RX ORDER — TRAMADOL HYDROCHLORIDE 50 MG/1
25 TABLET ORAL EVERY 4 HOURS PRN
Status: DISCONTINUED | OUTPATIENT
Start: 2022-07-10 | End: 2022-07-11

## 2022-07-10 RX ORDER — TRAMADOL HYDROCHLORIDE 50 MG/1
50 TABLET ORAL EVERY 4 HOURS PRN
Status: DISCONTINUED | OUTPATIENT
Start: 2022-07-10 | End: 2022-07-10

## 2022-07-10 RX ADMIN — Medication 400 MG: at 08:33

## 2022-07-10 RX ADMIN — INSULIN GLARGINE 35 UNITS: 100 INJECTION, SOLUTION SUBCUTANEOUS at 21:20

## 2022-07-10 RX ADMIN — ATORVASTATIN CALCIUM 40 MG: 40 TABLET, FILM COATED ORAL at 21:06

## 2022-07-10 RX ADMIN — TRAMADOL HYDROCHLORIDE 25 MG: 50 TABLET ORAL at 11:05

## 2022-07-10 RX ADMIN — POLYETHYLENE GLYCOL 3350 17 G: 17 POWDER, FOR SOLUTION ORAL at 08:32

## 2022-07-10 RX ADMIN — MICONAZOLE NITRATE: 2 POWDER TOPICAL at 08:33

## 2022-07-10 RX ADMIN — INSULIN LISPRO 4 UNITS: 100 INJECTION, SOLUTION INTRAVENOUS; SUBCUTANEOUS at 16:47

## 2022-07-10 RX ADMIN — Medication: at 21:15

## 2022-07-10 RX ADMIN — Medication 2000 UNITS: at 16:47

## 2022-07-10 RX ADMIN — INSULIN LISPRO 6 UNITS: 100 INJECTION, SOLUTION INTRAVENOUS; SUBCUTANEOUS at 11:57

## 2022-07-10 RX ADMIN — ARIPIPRAZOLE 5 MG: 5 TABLET ORAL at 08:32

## 2022-07-10 RX ADMIN — INSULIN LISPRO 2 UNITS: 100 INJECTION, SOLUTION INTRAVENOUS; SUBCUTANEOUS at 21:16

## 2022-07-10 RX ADMIN — INSULIN LISPRO 2 UNITS: 100 INJECTION, SOLUTION INTRAVENOUS; SUBCUTANEOUS at 08:32

## 2022-07-10 RX ADMIN — ACETAMINOPHEN 650 MG: 325 TABLET ORAL at 21:04

## 2022-07-10 RX ADMIN — MICONAZOLE NITRATE: 2 POWDER TOPICAL at 21:15

## 2022-07-10 RX ADMIN — ISOSORBIDE MONONITRATE 120 MG: 60 TABLET, EXTENDED RELEASE ORAL at 08:33

## 2022-07-10 RX ADMIN — TRIMETHOBENZAMIDE HYDROCHLORIDE 200 MG: 100 INJECTION INTRAMUSCULAR at 16:47

## 2022-07-10 RX ADMIN — SERTRALINE 50 MG: 25 TABLET, FILM COATED ORAL at 21:04

## 2022-07-10 RX ADMIN — ASPIRIN 81 MG: 81 TABLET, COATED ORAL at 08:32

## 2022-07-10 RX ADMIN — Medication 100 MG: at 08:32

## 2022-07-10 RX ADMIN — CYANOCOBALAMIN 1000 MCG: 1000 INJECTION, SOLUTION INTRAMUSCULAR; SUBCUTANEOUS at 08:33

## 2022-07-10 RX ADMIN — Medication 10 MG: at 21:03

## 2022-07-10 ASSESSMENT — PAIN SCALES - GENERAL
PAINLEVEL_OUTOF10: 6

## 2022-07-10 ASSESSMENT — PAIN - FUNCTIONAL ASSESSMENT: PAIN_FUNCTIONAL_ASSESSMENT: ACTIVITIES ARE NOT PREVENTED

## 2022-07-10 ASSESSMENT — PAIN DESCRIPTION - LOCATION
LOCATION: HIP;LEG
LOCATION: SHOULDER;KNEE
LOCATION: SHOULDER

## 2022-07-10 ASSESSMENT — PAIN DESCRIPTION - ORIENTATION: ORIENTATION: RIGHT

## 2022-07-10 ASSESSMENT — LIFESTYLE VARIABLES: HOW OFTEN DO YOU HAVE A DRINK CONTAINING ALCOHOL: NEVER

## 2022-07-10 ASSESSMENT — PAIN DESCRIPTION - DESCRIPTORS: DESCRIPTORS: ACHING

## 2022-07-10 NOTE — PROGRESS NOTES
Comprehensive Nutrition Assessment    Type and Reason for Visit:  Initial,Positive Nutrition Screen    Nutrition Recommendations/Plan:   1. Modify diet to carb control 3 and KRISTYN  2. Continue diabetic supplement TID     Malnutrition Assessment:  Malnutrition Status:  Severe malnutrition (07/10/22 1322)    Context:  Social/Environmental Circumstances     Findings of the 6 clinical characteristics of malnutrition:  Energy Intake:  50% or less estimated energy requirements for 1 month or longer  Weight Loss:  Greater than 10% over 6 months     Body Fat Loss:  No significant body fat loss     Muscle Mass Loss:  No significant muscle mass loss    Fluid Accumulation:  Unable to assess     Strength:  Not Performed    Nutrition Assessment:    Pt presented with severe malnutrition but is improving from a nutrition standpoint now with diet advancement. Currently on Dysphagia 3 diet, tolerating well. Intakes remain suboptimal but have progressed since initial admission. Noted hyperglycemia and trace edema, will modify diet to carb control and KRISTYN. Nutrition Related Findings:    Pmhx: ESRD with HD T/Th/Sat, CHF, CAD, COPD, DM, GERD, toe amputation. Presented to ED s/p fall from standing dx rib fractures. Pt initially with dysphagia leading to poor PO intake, now improving. Advanced to soft and bite sized 7/8. (7/8): Fc=599; K=WNL; Bun/Cr=25/3.43; Gluc=158-255. Last BM 7/9. Trace gen edema noted. Advanced to soft and bite sized 7/8. Repeat BSE 7/10. Wound Type: Surgical Incision       Current Nutrition Intake & Therapies:    Average Meal Intake: 51-75%  Average Supplements Intake: None Ordered  ADULT ORAL NUTRITION SUPPLEMENT; Breakfast, Lunch, Dinner; Diabetic Oral Supplement  ADULT DIET;  Dysphagia - Soft and Bite Sized; 1800 ml    Anthropometric Measures:  Height: 5' 7\" (170.2 cm)  Ideal Body Weight (IBW): 135 lbs (61 kg)    Admission Body Weight: 217 lb (98.4 kg) (stated 6/30)  Current Body Weight: 195 lb (88.5 kg) (bed 7/10),  Weight Source: Bed Scale  Current BMI (kg/m2): 30.5  Usual Body Weight: 230 lb (104.3 kg) (bed 1/13/22; 218 lb 14 oz 6/2/22)  % Weight Change (Calculated): -15.2  Weight Adjustment For: No Adjustment                 BMI Categories: Obese Class 1 (BMI 30.0-34. 9)    Estimated Daily Nutrient Needs:  Energy Requirements Based On: Kcal/kg  Weight Used for Energy Requirements: Current  Energy (kcal/day): 1327-1500kcal (15-17kcal/kg)  Weight Used for Protein Requirements: Ideal  Protein (g/day): 73-79g (1.2-1.3g/kg)  Method Used for Fluid Requirements: Standard Renal  Fluid (ml/day):  currently 1800ml restriction per MD     Nutrition Diagnosis:   · Altered nutrition-related lab values related to renal dysfunction,endocrine dysfuntion as evidenced by lab values    · Severe malnutrition,In context of social or environmental circumstances related to inadequate protein-energy intake as evidenced by poor intake prior to admission,weight loss greater than or equal to 10% in 6 months      Nutrition Interventions:   Food and/or Nutrient Delivery: Modify Current Diet,Continue Oral Nutrition Supplement  Nutrition Education/Counseling: No recommendation at this time  Coordination of Nutrition Care: Continue to monitor while inpatient       Goals:     Goals: PO intake 75% or greater,by next RD assessment,other (specify)  Specify Other Goals: Gluc< 160    Nutrition Monitoring and Evaluation:   Behavioral-Environmental Outcomes: None Identified  Food/Nutrient Intake Outcomes: Food and Nutrient Intake,Supplement Intake  Physical Signs/Symptoms Outcomes: Biochemical Data,Chewing or Swallowing,Meal Time Behavior,Weight    Discharge Planning:     Too soon to determine     Haris Hernandez MS, RD, LD

## 2022-07-10 NOTE — CONSULTS
Infectious Disease     Patient Name: Herlinda Celaya  Date: 7/10/2022  YOB: 1960  Medical Record Number: [id number]        No chief complaint on file. History of Present Illness:  Patient currently in rehab following hospitalization. She presented several weeks ago with fall. Blood cultures at 2 sets growing Enterococcus she was started on vancomycin. She had her dialysis catheter left chest was removed. She has been afebrile. She feels well overall.         Review of Systems: All other ROS reviewed and are negative other than as stated in HPI            Social History     Tobacco Use    Smoking status: Never Smoker    Smokeless tobacco: Never Used   Vaping Use    Vaping Use: Never used   Substance Use Topics    Alcohol use: No     Alcohol/week: 0.0 standard drinks    Drug use: No         Past Medical History:   Diagnosis Date    Angina at rest [facility]) 5/24/2020    Anxiety     CAD S/P percutaneous coronary angioplasty 2015, 2018    stents per dr Niels Gibson    CHF (congestive heart failure) ([address]     CKD (chronic kidney disease) stage 4, GFR 15-29 ml/min ([address] 2/24/2018    CKD stage 4 due to type 2 diabetes mellitus ([address]     Contusion of right chest wall 2/16/2021    COPD (chronic obstructive pulmonary disease) ([address]     Diabetic nephropathy with proteinuria ([address] 2014    DJD (degenerative joint disease) of knee     Dr Tory Talavera GERD (gastroesophageal reflux disease)     Hemiparesis, left ([address] 2013    entered Assisted Living ([facility])    Hemodialysis patient [facility])     Hemodialysis-associated hypotension 10/22/2021    History of heart failure     History of seizures     History of type C viral hepatitis     HTN (hypertension)     Hyperlipidemia     Impaired mobility and activities of daily living     Mediastinal lymphadenopathy 2013    Just[facility] Terrazas [place]    Metabolic syndrome     Moderate persistent asthma without complication 2/39/8079    Need for extended care facility 2021    Neurogenic urinary incontinence 2013    Neuropathy in diabetes [facility])     Nonrheumatic mitral valve regurgitation 2021    Nonrheumatic tricuspid valve regurgitation 2021    Obesity (BMI 30-39. 9)     Recurrent UTI     S/P colonoscopy     [facility], focal active colitis    Schizophrenia, paranoid, chronic ([address]     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 Bryon Driscoll      x1 Dr. Arron Cruz, Dr Villegas Milder 2018    Cinthya Nickerson  08/10/2021    [facility]    DIAGNOSTIC CARDIAC CATH LAB PROCEDURE  10/02/2019    DIALYSIS CATHETER INSERTION Left 2022    Tunneled Symetrex 15.5F x 23cm hemodialysis catheter inserted by Dr. Braxton Lewis, TOTAL ABDOMINAL (CERVIX REMOVED)      one ovary intact, Dr Jaiden Tristan, menorrhagia    IR THROMBECTOMY PERCUT AVF  2022    IR THROMBECTOMY PERCUT AVF 2022 [facility]ED[facility]    IR TUNNELED CATHETER PLACEMENT GREATER THAN 5 YEARS  2022    IR TUNNELED CATHETER PLACEMENT GREATER THAN 5 YEARS 6/3/2022 [facility]    IR TUNNELED CATHETER PLACEMENT GREATER THAN 5 YEARS Left 2022    15.5 fr by 23 cm Symetrex dialysis catheter inserted by Dr Gina López IR TUNNELED [address] 5 YEARS  2022    IR TUNNELED CATHETER PLACEMENT GREATER THAN 5 YEARS 7/7/2022 [facility] PROCED[facility]    MN TOTAL KNEE ARTHROPLASTY Left 06/21/2018    LEFT KNEE TOTAL KNEE ARTHROPLASTY, SHAYNA, NERVE BLOCK performed by Maldonado Bazzi MD at [address] [place] [address] [facility]      TOE AMPUTATION Right     TOTAL KNEE ARTHROPLASTY  05/19/2016    Dr Josiah Cooley TUNNELED [address] Right 07/01/2020    tunneled HD catheter per Dr Mynor Segura         No current facility-administered medications on file prior to encounter. Current Outpatient Medications on File Prior to Encounter   Medication Sig Dispense Refill    insulin aspart (NOVOLOG FLEXPEN) 100 UNIT/ML injection pen INJECT 8 TO 10 UNITS WITH EACH MEAL. 30 mL 3    ARIPiprazole (ABILIFY) 5 MG tablet TAKE 1 TABLET BY MOUTH ONCE DAILY 30 tablet 0    nitroGLYCERIN (NITROSTAT) 0.4 MG SL tablet up to max of 3 total doses. If no relief after 1 dose, call 911. 25 tablet 3    furosemide (LASIX) 20 MG tablet Take 1 tablet by mouth 2 times daily (Patient taking differently: Take 100 mg by mouth 2 times daily ) 60 tablet 0    NYAMYC [id number] UNIT/GM powder APPLY TO AFFECTED AREA OF ABDOMINAL FOLDS EVERY 12 HOURS AS NEEDED 60 g 5    insulin aspart (NOVOLOG FLEXPEN) 100 UNIT/ML injection pen 15 UNITS PLUS SLIDING SCALE   LESS THAN 180 NONE  181-250 2 UNITS  251-300 4 UNITS  301-400 6 UNITS   401-500 10 UNITS 10 pen 3    isosorbide mononitrate (IMDUR) 30 MG extended release tablet Take 4 tablets by mouth daily 30 tablet 3    atorvastatin (LIPITOR) 40 MG tablet TAKE 1 TABLET BY MOUTH DAILY (Patient taking differently: Take 40 mg by mouth nightly ) 30 tablet 12    magnesium oxide (MAG-OX) 400 MG tablet TAKE 1 TABLET BY MOUTH DAILY 30 tablet 12    melatonin 10 MG CAPS capsule Take 1 capsule by mouth nightly 30 capsule 12    midodrine (PROAMATINE) 5 MG tablet Take 1 tablet by mouth one time a day every Tue, Thu, Sat for hypotension. Give 30 mins prior to dialysis.  90 tablet 3    Handicap Placard MISC by Does not apply route Expiration in 5 years. 1 each 0    sertraline (ZOLOFT) 50 MG tablet TAKE 1 TABLET BY MOUTH DAILY (Patient taking differently: Take 50 mg by mouth nightly ) 30 tablet 2    lidocaine-prilocaine (EMLA) 2.5-2.5 % cream Apply topically as needed for Pain Apply topically as needed. 3 times a week before dialysis wrap with saran wrap      albuterol (PROVENTIL) (2.5 MG/3ML) 0.083% nebulizer solution Take 3 mLs by nebulization every 4 hours as needed for Wheezing 120 each 3    hydrOXYzine (ATARAX) 25 MG tablet TAKE ONE TABLET BY MOUTH TWO TIMES A DAY      insulin glargine (LANTUS SOLOSTAR) 100 UNIT/ML injection pen 70 UNITS AT BEDTIME 30 pen 3    blood glucose test strips (ONETOUCH VERIO) strip  each 3    OneTouch Delica Lancets 57Q MISC  each 3    Blood Glucose Monitoring Suppl (ONETOUCH VERIO) w/Device KIT AS DIRECTED 1 kit 0    Insulin Pen Needle (SURE COMFORT PEN NEEDLES) 30G X 8 MM MISC USE AS DIRECTED FIVE TIMES A DAILY 100 each 10    b complex-C-folic acid (NEPHROCAPS) 1 MG capsule Take 1 capsule by mouth daily      LANTUS SOLOSTAR 100 UNIT/ML injection pen 55 units at bedtime 10 pen 10    metoprolol tartrate (LOPRESSOR) 25 MG tablet TAKE 1/2 TABLET BY MOUTH TWO TIMES DAILY  (Patient taking differently: Take 12.5 mg by mouth 2 times daily ) 90 tablet 4    aspirin EC 81 MG EC tablet Take 1 tablet by mouth daily 30 tablet 12    blood glucose test strips (FREESTYLE LITE) strip 1 each by Does not apply route 4 times daily (before meals and nightly) As needed.  200 strip 5    Alcohol Swabs (EASY TOUCH ALCOHOL PREP MEDIUM) 70 % PADS USE AS DIRECTED THREE TIMES A  each 3    FreeStyle Lancets MISC Test 4x daily 150 each 3    acetaminophen (TYLENOL) 325 MG tablet Take 2 tablets by mouth every 4 hours as needed for Pain (For mild to moderate pain (Pain 1-6 out of 10 on pain scale)) 120 tablet 0    Blood Glucose Monitoring Suppl (FREESTYLE LITE) CORETTA 1 Device by Does not apply route daily as needed (Diabetes) Use freestyle meter to test blood sugar as needed 1 Device 0    nitroGLYCERIN (NITROSTAT) 0.4 MG SL tablet Place 1 tablet under the tongue every 5 minutes as needed for Chest pain         Allergies   Allergen Reactions    Codeine Hives     hives    Oxycontin [Oxycodone Hcl] Hives         Family History   Problem Relation Age of Onset    Cancer Mother 76        survived   Kalyani Blank Breast Cancer Mother     Hypertension Father     Diabetes Sister     Mental Illness Sister     Colon Cancer Neg Hx          Physical Exam:     Blood pressure (!) 145/64, pulse 94, temperature 98.1 °F (36.7 °C), temperature source Oral, resp. rate 18, height 5' 7\" (1.702 m), weight 195 lb (88.5 kg), SpO2 97 %, not currently breastfeeding. General: Patient appears ok at the present time. NAD  Skin: no new rashes  HEENT:  Neck is supple, No subconjunctival hemorrhages, no oral exudates  Heart: S1 S2  Lungs: clear bilaterally   Abdomen: soft, ND, NTTP,   Back :no CVA tenderness  Extrem: mildedema, non tender  Neuro exam: CN II-XII intact  Psych: cooperative    Labs: I have reviewed all lab results by electronic record, including most recent CBC, metabolic panel, and pertinent abnormalities were addressed from an infectious disease perspective. Trends are being monitored over time.    Lab Results   Component Value Date    WBC 9.4 07/10/2022    HGB 9.7 (L) 07/10/2022    HCT 26.9 (L) 07/10/2022    MCV 90.6 07/10/2022     07/10/2022     Lab Results   Component Value Date/Time     07/09/2022 07:53 PM    K 4.7 07/09/2022 07:53 PM    K 4.3 07/04/2022 03:07 AM    CL 91 07/09/2022 07:53 PM    CO2 26 07/09/2022 07:53 PM    BUN 25 07/09/2022 07:53 PM    CREATININE 3.43 07/09/2022 07:53 PM    GLUCOSE 224 07/09/2022 07:53 PM    GLUCOSE 419 07/30/2021 08:16 AM    CALCIUM 9.8 07/09/2022 07:53 PM        Radiology:  I have reviewed imaging results per electronic record and most pertinent abnormalities are being addressed from an infectious disease standpoint. ASSESSMENT:     Sepsis with Enterococcus  Infected dialysis cath      PLAN:  Tentative plan for 3 more weeks of vancomycin .   Appears  tunneled line has been replaced

## 2022-07-10 NOTE — PROGRESS NOTES
Pt brought to rehab at 1900. Pt in bed with 4/10 pain to right lower back and hip. Pt states that Tylenol helps the pain . Pt recently lost balance at home and fell fracturing right 10-11 th ribs. Pt has CKD stage 4. Pt has dialysis fistula graft to right upper arm that is not in use as of 7/8/22 due to being clotted off. No thrill or Bruit noted . No BP's or IV sticks to right arm. Pt does have tunneled Vascath that was put in by Dr Chad Lombardo, noted to left upper chest with 2 ports. Pt states that she has dialysis on Tue, [address], Sat. . Pt cooperative and pleasant. Alert and oriented x 4. Pt is on 1800 fluid restrictions. 02  2 liters prn here and at home. Pt tested negative for covid prior to coming to rehab. Pt came to ER  prior to this Rehab admission  with sepsis with enterococcus/infected dialysis cath, but cultures are clear now and are negative. Pt is to get vanco IV in dialysis which is Tuesday, Thursday and Saturday. Hx of CABG in January for stent and tricuspid valve replacement. One touch AC and HS. 249 at hs. Pt received lantus 35 units and Humalog 2 units at hs. with snack. Pt has bruise to back of right shoulder and right knee. Small sores noted to arms x 3 areas bilaterally from itching skin. Bandaid noted to areas. Pt's abdomen is discolored and bruised from insulin shots. Nystatin powder to abdominal folds. No edema noted to legs. Otherwise skin intact. Pt does void and can be incontinent of urine. Purwic at hs. Pt states that she takes Lasix at home but is not sure of the dosage. Other meds were checked with pt and on med list. Pt does have a hx of hep C and paranoid schitzophrenia. Last BM 7/9/22. No teeth, Pt has glasses, phone and  with her. PT and OT and Speech to eval and tx.oriented to rehab and consents signed. Call light in reach. Electronically signed by Charu Encarnacion.  Renetta Chen on 7/10/2022 at 4:19 AM

## 2022-07-10 NOTE — H&P
HISTORY & PHYSICAL       DATE OF ADMISSION:  7/9/2022    DATE OF SERVICE:  7/9/22    Subjective:    Juliana Lovell, 57 y.o. female presents today with:     CHIEF COMPLAINT:   weakness      HPI    I reviewed recent nursing note, \" see notes \".     Reason for Hospitalization:   1. Closed fracture of multiple ribs of right side, initial encounter    2. Concussion without loss of consciousness, initial encounter    3. Fall from standing, initial encounter    4. Chronic renal failure, stage 5 (HCC)    5. Bilateral pleural effusion    6. At high risk for injury related to fall    7. Physical deconditioning    8. Class 1 obesity due to excess calories with serious comorbidity and body mass index (BMI) of 33.0 to 33.9 in adult            Chief Complaint   Patient presents with    Back Pain       since fall last night from standing      Isolation:No active isolations     Hospital Course:  Admit Date: 6/30/2022  7:01 AM  Inpatient Rehab Referral Date: 6/30/22  Narrative of hospital course/history of present illness: 57 yr old female, ESRD with HD T/Th/Sat, presented to ED s/p fall from standing. Patient states she simply misstepped and fell straight backwards hitting the back of her head. No loss of consciousness. ED workup revealed minimally displaced acute Right 10th & 11th rib fractures, old compression fracture T11, Bilateral pleural effusions. ID: Sepsis with Enterococcus, Infected dialysis cath removed. Continue vancomycin x 3 weeks with HD. Neuro: Ataxia, bilateral lower extremity weakness and falls, peripheral neuropathy.    Neurosurgery:     OR 7/1 with Dr Kennedi Platt: Removal of tunneled left IJ HD CVC  OR 7/7 with Dr Kennedi Platt: Left IJ Tunneled HD Permcath insertion        The patient has stabilized medically and is able to participate at acute level rehab but is too medically complex for SNF due to need for therapy at the acute level with at least 15 hours a week of PT OT and cognitive and recreational therapy at an acute level with daily medical monitoring. Imaging:  Imaging and other studies reviewed and discussed with patient and staff    CT ABDOMEN PELVIS WO CONTRAST Additional Contrast? None    Result Date: 6/30/2022  CT ABDOMEN PELVIS WO CONTRAST: 6/30/2022 CLINICAL HISTORY:  severe low back and right flank pain after fall; assess for retroperitoneal hematoma . COMPARISON: 4/20/2022. TECHNIQUE: Spiral images were obtained of the abdomen and pelvis without contrast. All CT scans at this facility use dose modulation, iterative reconstruction, and/or weight based dosing when appropriate to reduce radiation dose to as low as reasonably achievable. FINDINGS: Minimally displaced acute fractures of the posterior right 10th and 11th ribs are noted. A small right pleural effusion has mildly decreased in size from the prior study. There is no organized hematoma, other displaced fractures, evidence of solid organ injury (within the limits of a noncontrast study), or other significant changes from 4/20/2022 identified. A small to moderate-sized left pleural effusion, mild probable scarring and/or atelectasis of the visualized lung bases, a moderate compression fracture of T11, and other previously described chronic findings appear unchanged. MINIMALLY DISPLACED ACUTE FRACTURES OF THE POSTERIOR RIGHT 10TH AND 11TH RIBS. NO OTHER SIGNIFICANT RECENT POSTTRAUMATIC COMPLICATION IDENTIFIED. CHRONIC FINDINGS, NOT SIGNIFICANTLY CHANGED FROM 4/20/2022. XR CHEST (2 VW)    Result Date: 6/30/2022  EXAMINATION:  CHEST. CLINICAL HISTORY:  CONFUSION. COMPARISONS:  6/2/2022. TECHNIQUE:  AP and lateral. FINDINGS: There is mild to moderate cardiomegaly. No definite evidence of pulmonary venous congestion. Chronic changes are noted in the left lower thorax. There is a dialysis catheter in place. There is a valve prosthesis in place. There are small pleural effusions or blunting of both costophrenic angles.      NO SIGNIFICANT CHANGE SINCE THE PREVIOUS EXAM. THERE HAS BEEN PLACEMENT OF A DIALYSIS CATHETER SINCE THAT EXAM.     XR RIBS RIGHT INCLUDE CHEST (MIN 3 VIEWS)    Result Date: 6/22/2022  EXAMINATION: XR RIBS RIGHT INCLUDE CHEST (MIN 3 VIEWS), 6/22/2022 10:24 AM CLINICAL HISTORY:  rib pain COMPARISON: None TECHNIQUE: AP chest and Right rib views,  5 views RIBS FINDINGS:  There are no lytic or sclerotic bone lesions. There are mild deformities of the lateral fifth, sixth, and seventh ribs which may represent subacute to chronic fractures. Status post median sternotomy. There is a left internal jugular central venous catheter in place. The cardiomediastinal silhouette is enlarged. The visualized portions of the lung and chest wall are within normal limits. There are no radiopaque foreign bodies. There are no acute changes. XR KNEE LEFT (3 VIEWS)    Result Date: 7/7/2022  XR KNEE LEFT (3 VIEWS) : 7/6/2022 CLINICAL HISTORY:  knee pain post fall . COMPARISON: 1/4/2019 TECHNIQUE: AP, lateral, internal and external oblique radiographs of the left knee were obtained. FINDINGS: A left total knee arthroplasty appears unremarkable. There is no fracture, dislocation, significant joint effusion, evidence hardware loosening, worrisome bone destruction, or other significant changes from 1/4/2019 identified. NO DISPLACED FRACTURE OR SIGNIFICANT POSTTRAUMATIC COMPLICATION IDENTIFIED. CT HEAD WO CONTRAST    Result Date: 6/30/2022  CT HEAD WO CONTRAST : 6/30/2022 CLINICAL HISTORY:  fall from standing last night confused at dialysis c/o back pain . COMPARISON: 10/11/2021. TECHNIQUE: Spiral unenhanced images were obtained of the head, with routine multiplanar reconstructions performed. All CT scans at this facility use dose modulation, iterative reconstruction, and/or weight based dosing when appropriate to reduce radiation dose to as low as reasonably achievable.  FINDINGS: There is no intracranial hemorrhage, mass effect, midline shift, 10/11/2021. TECHNIQUE:  Serial 2.5 mm scans. No contrast. FINDINGS:  There is some reversal of the normal lordotic curve. There is slight scoliosis. There is significant degenerative arthritis with narrowing of all the interspaces. No fracture is seen. No prevertebral soft tissue swelling. SIGNIFICANT DEGENERATIVE ARTHRITIS. NO FRACTURE IS SEEN. CT THORACIC SPINE WO CONTRAST    Result Date: 6/30/2022  EXAMINATION:  CT THORACIC SPINE. CLINICAL HISTORY:  TRAUMA. COMPARISONS:  NONE AVAILABLE TECHNIQUE:   Serial 2.5 mm scans. No contrast.  FINDINGS:  There is mild thoracic kyphoscoliosis. There is a compression fracture of T11 with some anterior wedging. Radiographic lead this appears to be old rather than acute. No other fracture is seen. There is mild to moderate degenerative arthritis. We note a small pleural effusion on the right and a moderate sized effusion on the left. OLD COMPRESSION FRACTURE OF T11. AN ACUTE FRACTURE IS NOT SEEN. BILATERAL PLEURAL EFFUSIONS. CT LUMBAR SPINE WO CONTRAST    Result Date: 6/30/2022  EXAMINATION:  CT LUMBAR SPINE. CLINICAL HISTORY:  TRAUMA. COMPARISONS:  NONE AVAILABLE TECHNIQUE:  Serial 2.5 mm scans. No contrast. FINDINGS:  Vertebral bodies are in fairly normal alignment. Interspaces are fairly well maintained. There is mild degenerative arthritis. No fracture is seen. MILD DEGENERATIVE ARTHRITIS. NO FRACTURE. MRI CERVICAL SPINE WO CONTRAST    Result Date: 7/7/2022  MRI CERVICAL SPINE WO CONTRAST : 7/6/2022 CLINICAL HISTORY:  canal stenosis, falls, gait ataxia . COMPARISON: Cervical spine CT 6/30/2022 TECHNIQUE: Multiplanar MR imaging of the cervical spine was performed. FINDINGS: Motion artifact limits some sequences. The spine is visualized from the craniovertebral junction through the T3-4 level on the diagnostic sagittal sequences. There is mild to moderate reversal of the normal cervical lordosis without significant subluxation.  The visualized spinal intervertebral disc height loss throughout the lumbar spine. L1-L2: No significant disc bulge, spinal canal or neuroforaminal stenosis. L2-L3: Small disc bulge. No neuroforaminal or spinal canal stenosis. L3-L4: Small disc bulge. Mild facet arthropathy. Ligamentum thickening. No neuroforaminal or spinal canal stenosis. L4-L5: Small disc bulge. Mild facet arthropathy. Ligament thickening. Mild spinal canal stenosis. No neuroforaminal stenosis. L5-S1: Small disc bulge. Mild facet arthropathy. No neuroforaminal or spinal canal stenosis. Visualized paravertebral soft tissues are grossly unremarkable. Degenerative changes of the lumbar spine as detailed. Localizer images demonstrate cervical spine degenerative changes with apparent multilevel high-grade spinal canal stenosis. IR FLUORO GUIDED CVA DEVICE PLMT/REPLACE/REMOVAL    1. Successful placement of a central venous catheter with subcutaneous tunnel in the internal jugular vein for dialysis. Radiation dose to the patient was: 13.01 mGy Additional clinical data: Chronic kidney disease requiring dialysis Procedure: 1. Ultrasound guidance for vascular access. The ultrasound image of the blood vessel was saved to PACS. 2.   Fluoroscopic guidance for placement of a central line. 3.   Placement of a central venous line with subcutaneous tunnel using the left internal jugular vein. Body of Report: Informed and written consent was obtained from the patient following discussion of risks, benefits and alternatives to this procedure. The was patient placed supine on the angiographic table. The patient's neck and chest were then prepped and draped in  normal sterile fashion. A small amount of local lidocaine anesthesia was injected subcutaneously. Ultrasound was used to study the jugular vein we intended to use prior to accessing it. The vein was patent. The ultrasound image of the blood vessel was saved to PACS.  Using ultrasound access, puncture was made of the left internal jugular vein using a 21 GA needle. A wire was advanced into the IVC, a short incision was made at the puncture site and serial dilatation performed. A 16 Kiswahili peel-away sheath was then advanced into the superior vena cava/right atrial junction. Next an incision was made at the site of the subcutaneous tunnel, approximately 4 cm caudal to the venous access site. A subcutaneous tunnel was created from the tunnel site to the venous access site in a retrograde fashion along with the 15.5 Bengali dialysis catheter. The data Dacron cuff was inserted through the tunnel and placed subcutaneously. The catheter was then advanced through the peel-away sheath, and sheath removed. The tip of the catheter lies at the SVC/right atrial junction. The line flushes and aspirates appropriately. The catheter lines were both flushed with 10 cc of normal saline, and then blocked with 100 units/cc heparin. The catheter was sutured to the skin with nylon suture, and sterile bandaging was placed. XR CHEST PORTABLE    Result Date: 6/30/2022  COMPARISON: No to 30 of 2022. HISTORY: SOB TECHNIQUE: AP view FINDINGS: No consolidating pneumonia or pneumothorax is seen. The costophrenic angles again appear blunted. The cardiac silhouette is enlarged. There is again evidence of median sternotomy and valve replacement. A central venous catheter is again seen in place and  unchanged in position. . There are appear to be healed rib fractures in the left hemithorax. Degenerative changes are seen in the spine and visualized right shoulder. Findings may represent mild CHF. It is not significantly change from previous study. IR TUNNELED CVC PLACE WO SQ PORT/PUMP > 5 YEARS    1. Successful placement of a central venous catheter with subcutaneous tunnel in the internal jugular vein for dialysis. Radiation dose to the patient was: 13.01 mGy Additional clinical data: Chronic kidney disease requiring dialysis Procedure: 1. Ultrasound guidance for vascular access. The ultrasound image of the blood vessel was saved to PACS. 2.   Fluoroscopic guidance for placement of a central line. 3.   Placement of a central venous line with subcutaneous tunnel using the left internal jugular vein. Body of Report: Informed and written consent was obtained from the patient following discussion of risks, benefits and alternatives to this procedure. The was patient placed supine on the angiographic table. The patient's neck and chest were then prepped and draped in  normal sterile fashion. A small amount of local lidocaine anesthesia was injected subcutaneously. Ultrasound was used to study the jugular vein we intended to use prior to accessing it. The vein was patent. The ultrasound image of the blood vessel was saved to PACS. Using ultrasound access, puncture was made of the left internal jugular vein using a 21 GA needle. A wire was advanced into the IVC, a short incision was made at the puncture site and serial dilatation performed. A 16 Swedish peel-away sheath was then advanced into the superior vena cava/right atrial junction. Next an incision was made at the site of the subcutaneous tunnel, approximately 4 cm caudal to the venous access site. A subcutaneous tunnel was created from the tunnel site to the venous access site in a retrograde fashion along with the 15.5 Faroese dialysis catheter. The data Dacron cuff was inserted through the tunnel and placed subcutaneously. The catheter was then advanced through the peel-away sheath, and sheath removed. The tip of the catheter lies at the SVC/right atrial junction. The line flushes and aspirates appropriately. The catheter lines were both flushed with 10 cc of normal saline, and then blocked with 100 units/cc heparin. The catheter was sutured to the skin with nylon suture, and sterile bandaging was placed.      MRI BRAIN WO CONTRAST    Result Date: 7/4/2022  EXAM: MRI of the brain without contrast History: Stroke. Weakness. Technique: Multiplanar multisequence MRI of the brain was performed without contrast. Comparison: MRI of the brain October 9, 2017. CT of the brain June 30, 2022 Findings: Foci of white matter signal abnormality within the supratentorial white matter are nonspecific but most compatible with chronic small vessel ischemic changes in a patient of this age. Prominence of the sulci and ventricles compatible with mild generalized parenchymal volume loss. No acute hemorrhage, mass, mass effect, midline shift, or abnormal extra-axial fluid collection. Midline structures are within normal limits. The posterior fossa is within normal limits. There is no diffusion restriction. No susceptibility artifact is identified on the gradient echo sequence. The major intracranial vascular flow voids are maintained. Cranial nerves 7/8 complexes appear grossly unremarkable. Mild paranasal sinus mucosal thickening. Fluid is present within the bilateral mastoid air cells compatible with nonspecific mastoid air  cell effusions. No acute ischemia or acute intracranial process. Generalized parenchymal volume loss and nonspecific white matter findings most compatible with chronic small vessel ischemic changes in a patient of this age. IR ULTRASOUND GUIDANCE VASCULAR ACCESS    1. Successful placement of a central venous catheter with subcutaneous tunnel in the internal jugular vein for dialysis. Radiation dose to the patient was: 13.01 mGy Additional clinical data: Chronic kidney disease requiring dialysis Procedure: 1. Ultrasound guidance for vascular access. The ultrasound image of the blood vessel was saved to PACS. 2.   Fluoroscopic guidance for placement of a central line. 3.   Placement of a central venous line with subcutaneous tunnel using the left internal jugular vein. Body of Report:  Informed and written consent was obtained from the patient following discussion of risks, benefits and alternatives to this procedure. The was patient placed supine on the angiographic table. The patient's neck and chest were then prepped and draped in  normal sterile fashion. A small amount of local lidocaine anesthesia was injected subcutaneously. Ultrasound was used to study the jugular vein we intended to use prior to accessing it. The vein was patent. The ultrasound image of the blood vessel was saved to PACS. Using ultrasound access, puncture was made of the left internal jugular vein using a 21 GA needle. A wire was advanced into the IVC, a short incision was made at the puncture site and serial dilatation performed. A 16 Syriac peel-away sheath was then advanced into the superior vena cava/right atrial junction. Next an incision was made at the site of the subcutaneous tunnel, approximately 4 cm caudal to the venous access site. A subcutaneous tunnel was created from the tunnel site to the venous access site in a retrograde fashion along with the 15.5 Brazilian dialysis catheter. The data Dacron cuff was inserted through the tunnel and placed subcutaneously. The catheter was then advanced through the peel-away sheath, and sheath removed. The tip of the catheter lies at the SVC/right atrial junction. The line flushes and aspirates appropriately. The catheter lines were both flushed with 10 cc of normal saline, and then blocked with 100 units/cc heparin. The catheter was sutured to the skin with nylon suture, and sterile bandaging was placed. FL MODIFIED BARIUM SWALLOW W VIDEO    Result Date: 7/1/2022  [facility] MODIFIED BARIUM SWALLOW W VIDEO : 7/1/2022 CLINICAL HISTORY:  Choking with meal, concern for aspiration/dysphagia . COMPARISON: 5/10/2013. TECHNIQUE: Lateral videofluoroscopy was provided during speech therapy evaluation during ingestion of various barium liquids and semisolids. A total of 1.6 minutes of fluoroscopy time was used, with a total of  10 fluoroscopic series and one still saved.  No diagnostic images were saved. Oral contrast:  50 mL BaSO4 FINDINGS: Mild to moderate oral dysphagia is observed with premature entry into the piriform sinuses and mild to moderate oral residual that cleared with independent re-swallowing. Endovaginal laryngeal penetration was observed with thin and soft barium consistencies that immediately cleared. Mild to moderate residual within the vallecula cleared with recent swallowing. No tracheal aspiration was identified. A full report will be made by the speech pathology team.     MILD TO MODERATELY ABNORMAL MODIFIED BARIUM SWALLOW. NO TRACHEAL ASPIRATION IDENTIFIED. A FULL REPORT WILL BE MADE BY THE SPEECH PATHOLOGY TEAM.     IR REMOVE TUNNELED CVAD WO SQ PORT/PUMP    1. Successful removal of a tunneled internal jugular vein dialysis catheter. DIAGNOSIS: MRSA bacteremia RADIATION DOSE: 0.25 mGy FINDINGS: Patient was placed supine on the procedure table. The chest and neck including the existing tunneled catheter were prepped and draped in sterile fashion. Lidocaine was used to anesthetize the skin tunnel site. Hemostats were used to dissect the Dacron anchor cuff from the surrounding tissue. Under gentle traction and while holding pressure at the neck venotomy site, the catheter was easily removed. The catheter was checked for any missing components, catheter was intact. Pressure was held at the neck until hemostasis was achieved. Sterile dressing was applied. Patient tolerated the procedure well without any complications. Patient was discharged home in normal and stable condition.             Labs:    Labs reviewed and discussed with patient and staff    Lab Results   Component Value Date/Time    POCGLU 249 07/09/2022 08:04 PM    POCGLU 126 07/09/2022 11:17 AM    POCGLU 116 07/09/2022 08:23 AM    POCGLU 208 07/08/2022 08:00 PM    POCGLU 155 07/08/2022 04:17 PM     Lab Results   Component Value Date/Time     07/09/2022 07:53 PM    K 4.7 07/09/2022 07:53 PM    K 4.3 07/04/2022 03:07 AM    CL 91 07/09/2022 07:53 PM    CO2 26 07/09/2022 07:53 PM    BUN 25 07/09/2022 07:53 PM    CREATININE 3.43 07/09/2022 07:53 PM    CALCIUM 9.8 07/09/2022 07:53 PM    LABALBU 3.9 07/09/2022 07:53 PM    BILITOT 0.9 07/04/2022 03:07 AM    ALKPHOS 117 07/04/2022 03:07 AM    AST 21 07/04/2022 03:07 AM    ALT 16 07/04/2022 03:07 AM     Lab Results   Component Value Date/Time    WBC 9.6 07/09/2022 05:27 AM    RBC 2.99 07/09/2022 05:27 AM    HGB 9.8 07/09/2022 05:27 AM    HCT 27.0 07/09/2022 05:27 AM    MCV 90.3 07/09/2022 05:27 AM    MCH 32.7 07/09/2022 05:27 AM    MCHC 36.2 07/09/2022 05:27 AM    RDW 14.3 07/09/2022 05:27 AM     07/09/2022 05:27 AM    MPV 10.0 03/05/2020 12:00 AM     Lab Results   Component Value Date/Time    VITD25 21 10/22/2019 12:00 AM     Lab Results   Component Value Date/Time    COLORU Yellow 04/20/2022 10:45 AM    LABSPEC 1.010 03/08/2016 12:00 AM    NITRU Negative 04/20/2022 10:45 AM    GLUCOSEU Negative 04/20/2022 10:45 AM    KETUA TRACE 04/20/2022 10:45 AM    UROBILINOGEN 1.0 04/20/2022 10:45 AM    BILIRUBINUR Negative 04/20/2022 10:45 AM    BILIRUBINUR 3+ 09/17/2021 12:49 PM     Lab Results   Component Value Date/Time    PROTIME 16.3 06/30/2022 07:30 AM     Lab Results   Component Value Date/Time    INR 1.3 06/30/2022 07:30 AM           The patient remains highly medically complex and continues to have severe problems with activities of daily living and mobility. The patient was assessed to be able to tolerate intensive rehabilitation and therefore was admitted to Rehabilitation to address these needs.     The patient has been found to have severe abnormality of gait and mobility with impaired self care and is admitted to the acute inpatient rehab program.       Prior Function; everyday activities:     Social History     Socioeconomic History    Marital status:      Spouse name: Not on file    Number of children: 2    Years of education: Not on file   Kalyani Blank Highest education level: Not on file   Occupational History    Occupation: disabled   Tobacco Use    Smoking status: Never Smoker    Smokeless tobacco: Never Used   Vaping Use    Vaping Use: Never used   Substance and Sexual Activity    Alcohol use: No     Alcohol/week: 0.0 standard drinks    Drug use: No    Sexual activity: Not Currently   Other Topics Concern    Not on file   Social History Narrative    Disabled, lives in [place] [place] close by     [employer] in [place], one of 5    Twin sister Reyes, very ill in 2018, [place] 2019    Moved to [facility], , 2 children, one son and one daughter    Worked at [employer], as a nurse's aide    Disabled due to mental illness    Lived at [facility], was discharged, returned to independent living in 2017 in the daughter's house and has adjusted well    One son and one daughter, live in the same house with patient, [employer] pays the rent    [employer] reading ([email or web address])             10/11/2021 [facility] updates; patient lives with her daughter son-in-law and 2 grandchildren and patient's handicapped son. Per daughter, her brother is blind, MRDD, multiple health issues. Daughter's  is patient's legal guardian. Patient has hemodialysis Tuesday Thursday and Saturday. Patient's bedroom is on main floor with a half bath. Daughter walks patient upstairs once weekly for full bath. Patient is using her walker in the home. Patient has a hospital bed in the home. Social Determinants of Health     Financial Resource Strain:     Difficulty of Paying Living Expenses: Not on file   Food Insecurity:     Worried About Running Out of Food in the Last Year: Not on file    Yung of Food in the Last Year: Not on file   Transportation Needs:     Lack of Transportation (Medical): Not on file    Lack of Transportation (Non-Medical):  Not on file   Physical Activity: Sufficiently Active    Days of Exercise per Week: 3 days    GERD (gastroesophageal reflux disease)     Hemiparesis, left ([address] 2013    entered Assisted Living ([facility])    Hemodialysis patient [facility])     Hemodialysis-associated hypotension 10/22/2021    History of heart failure     History of seizures     History of type C viral hepatitis     HTN (hypertension)     Hyperlipidemia     Impaired mobility and activities of daily living     Mediastinal lymphadenopathy     Dionte Finch    Metabolic syndrome     Moderate persistent asthma without complication     Need for extended care facility 2021    Neurogenic urinary incontinence 2013    Neuropathy in diabetes [facility])     Nonrheumatic mitral valve regurgitation 2021    Nonrheumatic tricuspid valve regurgitation 2021    Obesity (BMI 30-39. 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 Arguello Oh      x1 Dr. Yolande Yanez, Dr Suzzanna Prader 2018   Candelario Dy  08/10/2021    [facility]    DIAGNOSTIC CARDIAC CATH LAB PROCEDURE  10/02/2019    DIALYSIS CATHETER INSERTION Left 2022    Tunneled Symetrex 15.5F x 23cm hemodialysis catheter inserted by Dr. Mitch Meade, TOTAL ABDOMINAL (CERVIX REMOVED)      one ovary intact, Dr Malika Herrera, menorrhagia    IR THROMBECTOMY PERCUT AVF  06/06/2022    IR THROMBECTOMY PERCUT AVF 6/6/2022 [facility] PROCEDURE    IR TUNNELED CATHETER PLACEMENT GREATER THAN 5 YEARS  06/03/2022    IR TUNNELED CATHETER PLACEMENT GREATER THAN 5 YEARS 6/3/2022 [facility] PROCEDURE    IR TUNNELED CATHETER PLACEMENT GREATER THAN 5 YEARS Left 07/07/2022    15.5 fr by 23 cm Symetrex dialysis catheter inserted by Dr Desi Chu IR TUNNELED [address] 5 YEARS  7/7/2022    IR TUNNELED CATHETER PLACEMENT GREATER THAN 5 YEARS 7/7/2022 [facility] PROCEDURE    AR TOTAL KNEE ARTHROPLASTY Left 06/21/2018    LEFT KNEE TOTAL KNEE ARTHROPLASTY, SHAYNA, NERVE BLOCK performed by Rosa M Maciel MD at [address] [place] [address] PTCA      TOE AMPUTATION Right     TOTAL KNEE ARTHROPLASTY  05/19/2016    Dr Gordon Carlson TUNNELED [address] Right 07/01/2020    tunneled HD catheter per Dr Jojo Guerra       Current Facility-Administered Medications   Medication Dose Route Frequency Provider Last Rate Last Admin    melatonin tablet 3 mg  3 mg Oral Nightly PRN Stefan Johnson MD        acetaminophen (TYLENOL) tablet 650 mg  650 mg Oral Q6H PRN Stefan Johnson MD   650 mg at 07/09/22 2031    Or    acetaminophen (TYLENOL) suppository 650 mg  650 mg Rectal Q6H PRN Stefan Johnson MD        albuterol (PROVENTIL) nebulizer solution 2.5 mg  2.5 mg Nebulization Q4H PRN Stefan Johnson MD        ARIPiprazole (ABILIFY) tablet 5 mg  5 mg Oral Daily Stefan Johnson MD        aspirin EC tablet 81 mg  81 mg Oral Daily Stefan Johnson MD        atorvastatin (LIPITOR) tablet 40 mg  40 mg Oral Nightly Stefan Johnson MD   40 mg at 07/09/22 2033    camphor-menthol-methyl salicylate (BENGAY ULTRA STRENGTH) 4-10-30 % cream   Apply externally TID PRN Stefan Johnson MD   Given at 07/09/22 2031    dextrose 5 % solution  100 mL/hr IntraVENous PRN Stefan Johnson MD        dextrose bolus 10% 125 mL  125 mL IntraVENous PRN Jose Alberto Dowell MD        Or    dextrose bolus 10% 250 mL  250 mL IntraVENous PRN Jose Alberto Dowell MD        glucagon (rDNA) injection 1 mg  1 mg IntraMUSCular PRN Jose Alberto Dowell MD        glucose chewable tablet 16 g  4 tablet Oral PRN Jose Alberto Dowell MD        hydrOXYzine HCl (ATARAX) tablet 25 mg  25 mg Oral TID PRN Jose Alberto Dowell MD        insulin glargine (LANTUS) injection vial 35 Units  35 Units SubCUTAneous Nightly Jose Alberto Dowell MD   35 Units at 07/09/22 2043    [START ON 7/10/2022] insulin lispro (HUMALOG) injection vial 0-12 Units  0-12 Units SubCUTAneous TID WC Jose Alberto Dowell MD        insulin lispro (HUMALOG) injection vial 0-6 Units  0-6 Units SubCUTAneous Nightly Jose Alberto Dowell MD   2 Units at 07/09/22 2044    isosorbide mononitrate (IMDUR) extended release tablet 120 mg  120 mg Oral Daily Jose Alberto Dowell MD        lidocaine 4 % external patch 3 patch  3 patch TransDERmal Daily Jose Alberto Dowell MD        magnesium oxide (MAG-OX) tablet 400 mg  400 mg Oral Daily Jose Alberto Dowell MD        melatonin disintegrating tablet 10 mg  10 mg Oral Nightly Jose Alberto Doewll MD   10 mg at 07/09/22 2033    miconazole (MICOTIN) 2 % powder   Topical BID Jose Alberto Dowell MD   Given at 07/09/22 2030    [START ON 7/11/2022] midodrine (PROAMATINE) tablet 5 mg  5 mg Oral Once per day on Mon Wed Fri Jose Alberto Dowell MD        polyethylene glycol (GLYCOLAX) packet 17 g  17 g Oral Daily Jose Alberto Dowell MD        sertraline (ZOLOFT) tablet 50 mg  50 mg Oral Nightly Jose Alberto Dowell MD   50 mg at 07/09/22 2033    vancomycin (VANCOCIN) intermittent dosing (placeholder)   Other RX Placeholder Jose Alberto Dowell MD           Allergies   Allergen Reactions    Codeine Hives     hives    Oxycontin [Oxycodone Hcl] Hives                      FAMILY HISTORY:  Does not pertain to chief complaint.      Review of Systems       Objective  BP (!) 126/56   Pulse 91   Temp 98.2 °F (36.8 °C)   Resp 18   LMP  (LMP Unknown)   SpO2 98% *    Physical Exam  Ortho Exam  Neurologic Exam         ROS x10: The patient also complains of severely impaired mobility and activities of daily living. Otherwise no new problems with vision, hearing, nose, mouth, throat, dermal, cardiovascular, GI, , pulmonary, musculoskeletal, psychiatric or neurological. See also Acute Rehab PM&R H&P. Vital signs:  BP (!) 126/56   Pulse 91   Temp 98.2 °F (36.8 °C)   Resp 18   LMP  (LMP Unknown)   SpO2 98%   I/O:   PO/Intake:  fair PO intake,    diet    Bowel:   continent   Bladder: continent     howard  General:  Patient is well developed,   adequately nourished, and    well kempt. HEENT:    Pupils equal, hearing intact to loud voice, external inspection of ear and nose benign. Inspection of lips, tongue and gums benign    Musculoskeletal: No significant change in strength or tone. All joints stable. Inspection and palpation of digits and nails show no clubbing, cyanosis or inflammatory conditions. Neuro/Psychiatric: Affect: flat but pleasant. Alert and oriented to person, place and situation with    cues. No significant change in deep tendon reflexes or sensation  Lungs:  Diminished, CTA-B. Respiration effort is   normal at rest.     Heart:   S1 = S2,   RRR. Abdomen:  Soft, non-tender, no enlargement of liver or spleen. Extremities:   trace lower extremity edema    Skin:   Intact to general survey,           After extensive review of the records and above physical exam, I have formulated the following diagnoses and plan:      DIAGNOSES:    1.   The patient was admitted to the acute rehabilitation unit with the primary rehab diagnosis being severe abnormality of gait and mobility and impaired self care and ADL's due to  Debility with MRSA septicemia    Compared to Pre-Admission Assessment, patients medical and functional status remain challengingly complex and patient continues to requireintensive therapeutic intervention from multiple therapies, therefore, initiate acute intensive comprehensive inpatient rehabilitation program including PT/OT to improve balance, ambulation, ADLs, and to improve the P/AROM. Functional and medical status reassessed regarding patients ability to participate in therapies and patient found to be able to participate in acute intensive comprehensive inpatient rehabilitation program.  Therapeutic modifications regarding activities in therapies, place, amount of time per day and intensity of therapy made daily. Enroll in acute course of therapy program to include 1 1/2 hours per day of PT 5 to 7days per week and 1 1/2 hours per day of OT 5 to 7 days per week,   SLP and Rec T 1/2 hour per day 3-5 days per week. The patient is stable medically and physically on previous exam.  If deemed necessary therapy will be spread out over a 7-day window. This patient presented with significant new onset decreased mobility and inability to perform activities of daily living skills independently and is at significant risk for prolonged disability  For this reason they have been admitted to [address] [place] [address]. The patient's current functional and medical status are highly complex but the patient is able to participate in intensive rehabilitation. A comprehensive inpatient rehabilitation program is appropriate. The patient will undergo initial evaluation by the rehabilitation team and be discussed at regular treatment team meetings to assess progress, mobility, self care, mood and discharge issues. Physical therapy will be consulted for mobility and endurance issues and should be performed 1 to 2 times per day, 7 days per week for the length of stay. Occupational therapy will be consulted for activities of daily living and should be performed 1 to 2 times per day, 7 days per week for the length of stay.   Their capacity to participate at an acute level, decision to be treated in the gym, room or on the unit, their activity goals for the day and the number of minutes of active participation will be reassessed and re-prescribed daily. Because this patient is medically complex, I will check a CBC, BMP, UA and orthostatic blood pressures. They will be reassessed daily regarding their ability to participate in an acute level rehabilitation program.  Recreational Therapy will be consulted for community re-entry and adjustment to disability. Communication, cognitive and emotional issues will also be addressed during this rehabilitation stay by rehabilitation psychologist or speech therapist as appropriate. I reviewed the patient's old and current charts and discussed other rehabilitation options with the rehabilitation team including the rehab RN and the admission team as well as the patient. I feel that the patient's functional recovery would be best served at an acute inpatient rehabilitation program because the patient needs intense therapy three hours per day, direct RN supervision and daily monitoring by a physician for medical status. This cannot be sufficiently provided by home health care, a skilled nursing facility or in an outpatient setting. I further feel that the patient has the potential to improve functional abilities in an acute intensive rehabilitation program.    Old records were reviewed and summarized. 2.  Other diagnoses which complicate rehabilitation stay include: Active Problems:    * No active hospital problems. *  Resolved Problems:    * No resolved hospital problems.  *    Patient Active Problem List   Diagnosis    Atherosclerotic heart disease of native coronary artery with unspecified angina pectoris (HCC)    Schizophrenia, paranoid, chronic    Metabolic syndrome    Vitamin B 12 deficiency    Cerebral microvascular disease    Mixed hyperlipidemia    Other hammer toe (acquired)    Vitamin D insufficiency    Incontinence of urine    Diabetic nephropathy with proteinuria (HCC)    Essential (primary) hypertension    History of type C viral hepatitis    Urinary incontinence due to cognitive impairment    History of seizures    Stented coronary artery-plan is to stay on Plavix indefinately per Dr Henry Guzman Other specified diabetes mellitus with diabetic neuropathy, unspecified ([address]    Controlled type 2 diabetes mellitus with diabetic neuropathy, with long-term current use of insulin ([facility]    Hemiparesis, left ([facility])    Angina, class II ([address]    Pain, unspecified    Tardive dyskinesia    Shortness of breath    Chronic diastolic congestive heart failure ([facility])    Sleep apnea, unspecified    Pulmonary hypertension, unspecified ([facility])    Class 2 severe obesity with serious comorbidity and body mass index (BMI) of 36.0 to 36.9 in adult ([facility])    Edema    Closed supracondylar fracture of right humerus    Other chronic pain    Palliative care patient    Recurrent falls    Renal failure    Difficulty in walking    ESRD (end stage renal disease) on dialysis ([facility])    Weakness    Other seizures ([facility])    Moderate persistent asthma without complication    [id number]    Post PTCA    Falls frequently    Chest wall contusion, left, initial encounter    Headache, unspecified    Paranoid schizophrenia ([address]    Compression fracture of spine ([facility])    Closed rib fracture    Depression    Chronic obstructive pulmonary disease ([facility])    Critical illness polyneuropathy ([address]    Multiple closed fractures of ribs of right side    Nonrheumatic mitral valve regurgitation    Nonrheumatic tricuspid valve regurgitation    Need for extended care facility    Chronic pain of both shoulders    Hemodialysis-associated hypotension    Anginal chest pain at rest [facility])    Chest pain    Unstable angina ([facility])    NSTEMI (non-ST elevated myocardial infarction) ([facility])    CKD (chronic kidney disease) stage 4, GFR 15-29 ml/min ([facility])    Hyperkalemia    Impaired mobility and activities of daily living    Dialysis patient [facility])    Unspecified open wound of right upper arm, initial encounter    Multiple closed fractures of right lower extremity and ribs    Closed T11 fracture (HCC)    Encephalopathy acute    MRSA bacteremia    Sepsis due to Enterococcus ([facility]    Local infection due to central venous catheter    DJD (degenerative joint disease), cervical    Closed head injury    Sarcopenia    Fall from standing       I am especially concerned about their recent medical complexities. The patient's high risk medication use includes  See mar . The patient is high risk for urinary tract infection, an admission urinalysis has been ordered. I will have the nurses check post void residual bladder volumes and place acatheter if excessive urine is retained in the bladder after voiding. The patient is risk for deep venous thromosis, complex deep venous thrombosis protocol prophylaxis has been ordered  See mar . The patient is high risk for orthostasis and a hydration program and orthostatic blood pressure screening have been ordered. I will attempt to get old records from the patient's previous hospital stay. Care everywhere on [email or web address] was utilized. 3.  Current and previous medications were reviewed and summarized and compared to old medication lists from home and from the acute floor. 4.  Complex labs and x-rays were reviewed. I will review patient's old EKG and labs. 5.  Will provide emotional support for this patient regarding adjustment to their disability. Cognition and mood will be screened daily and addressed by rehabilitation psychology and/or speech therapy as appropriate. I have encouraged the patient to attend the Rehab Adjustment to Disability Support Group and recreational therapy. 6.  Estimated length of stay is   2-3 weeks. Discharge to home with help from family and home health PT, OT, RN, and aide.   Patient should be independent at discharge. 7.  The patient's medical and rehab prognosis are good. [address] regarding the patient's back up to general medical needs. A welcome letter was presented with an explanation of my services, my specialty and what to expect during the rehabilitation process. As well as introducing myself, I also wrote my name on their bedside marker board with their name as well as the names of the other physicians with an explanation of our individual roles in their care, as well as the rehab process.       Tona Goodwin, CHANDRA.O., F.A.A.P.M.&LANDON.

## 2022-07-10 NOTE — PROGRESS NOTES
[facility]   Facility/Department: [facility]  Speech Language Pathology  Clinical Bedside Swallow Evaluation    NAME:Michelle Jerome  : 1960 (57 y.o.)   [x]   confirmed    MRN: [id number]  ROOM: [place] [address]  ADMISSION DATE: 2022  PATIENT DIAGNOSIS(ES): Septicemia ([address] [A41.9]  No chief complaint on file.     Patient Active Problem List    Diagnosis Date Noted    Unstable angina ([address] 2022    Chest pain     Chronic hepatitis C ([address] 07/10/2022    Septicemia ([address] 2022    Sarcopenia 2022    Fall from standing     DJD (degenerative joint disease), cervical 2022    Closed head injury     MRSA bacteremia 2022    Sepsis due to Enterococcus ([address]     Local infection due to central venous catheter     Impaired mobility and activities of daily living dt polyneuropathy and reccent fall  2022    Closed T11 fracture ([address] 2022    Encephalopathy acute 2022    Unspecified open wound of right upper arm, initial encounter 2022    Hyperkalemia 2022    CKD (chronic kidney disease) stage 4, GFR 15-29 ml/min ([address] 2022    Dialysis patient ([address] 2022    Multiple closed fractures of right lower extremity and ribs 2022    NSTEMI (non-ST elevated myocardial infarction) ([address] 2022    Anginal chest pain at rest [facility]) 2022    Hemodialysis-associated hypotension 10/22/2021    Chronic pain of both shoulders 2021    Nonrheumatic mitral valve regurgitation 2021    Nonrheumatic tricuspid valve regurgitation 2021    Need for extended care facility 2021    Multiple closed fractures of ribs of right side 2021    Depression 2021    Chronic obstructive pulmonary disease ([address] 2021    Critical illness polyneuropathy ([address] 2021    Compression fracture of spine ([address] 2021    Closed rib fracture 2021    Chest wall contusion, left, initial encounter 2021  Falls frequently 01/19/2021    Post PTCA     Headache, unspecified 01/03/2021    COVID-19 12/26/2020    Moderate persistent asthma without complication 95/98/5680    Weakness 08/05/2020    Difficulty in walking 07/27/2020    Atherosclerotic heart disease of native coronary artery with unspecified angina pectoris ([address] 07/03/2020    Essential (primary) hypertension 07/03/2020    Pain, unspecified 07/03/2020    ESRD (end stage renal disease) on dialysis ([address] 07/03/2020    Other seizures ([address] 07/03/2020    Paranoid schizophrenia ([address] 07/03/2020    Renal failure 06/28/2020    Recurrent falls 06/16/2020    Edema 12/23/2019    Closed supracondylar fracture of right humerus 12/23/2019    Other chronic pain 12/23/2019    Palliative care patient 12/23/2019    Class 2 severe obesity with serious comorbidity and body mass index (BMI) of 36.0 to 36.9 in adult [facility]) 12/03/2019    Sleep apnea, unspecified 11/05/2019    Pulmonary hypertension, unspecified ([address] 11/05/2019    Chronic diastolic congestive heart failure ([address] 10/04/2019    Shortness of breath 10/02/2019    Tardive dyskinesia 05/16/2019    Angina, class II ([address]     Controlled type 2 diabetes mellitus with diabetic neuropathy, with long-term current use of insulin ([address] 02/24/2017    Other specified diabetes mellitus with diabetic neuropathy, unspecified ([address] 08/04/2016    Stented coronary artery-plan is to stay on Plavix indefinately per Dr Frankey Combe 06/02/2016    History of seizures     Urinary incontinence due to cognitive impairment     History of type C viral hepatitis     Diabetic nephropathy with proteinuria ([address]     Incontinence of urine 04/07/2014    Vitamin D insufficiency 02/04/2014    Vitamin B 12 deficiency 06/05/2013    Cerebral microvascular disease 06/05/2013    Hemiparesis, left ([address] 01/01/2013    Schizophrenia, paranoid, chronic     Metabolic syndrome     Mixed hyperlipidemia 12/09/2010    Other hammer toe (acquired) 12/13/2007     Past Medical History:   Diagnosis Date    Angina at rest [facility]) 5/24/2020    Anxiety     CAD S/P percutaneous coronary angioplasty 2015, 2018    stents per dr Tawanda Laboy    CHF (congestive heart failure) ([address]     CKD (chronic kidney disease) stage 4, GFR 15-29 ml/min ([address] 2/24/2018    CKD stage 4 due to type 2 diabetes mellitus ([address]     Contusion of right chest wall 2/16/2021    COPD (chronic obstructive pulmonary disease) ([address]     Diabetic nephropathy with proteinuria ([address] 2014    DJD (degenerative joint disease) of knee     Dr Yudith Love GERD (gastroesophageal reflux disease)     Hemiparesis, left ([address] 2013    entered Assisted Living ([facility])    Hemodialysis patient [facility])     Hemodialysis-associated hypotension 10/22/2021    History of heart failure     History of seizures     History of type C viral hepatitis     HTN (hypertension)     Hyperlipidemia     Impaired mobility and activities of daily living     Mediastinal lymphadenopathy 2013    Luis Cookisaac Torrez    Metabolic syndrome     Moderate persistent asthma without complication 8/08/6448    Need for extended care facility 7/7/2021    Neurogenic urinary incontinence 2013    Neuropathy in diabetes [facility])     Nonrheumatic mitral valve regurgitation 7/7/2021    Nonrheumatic tricuspid valve regurgitation 7/7/2021    Obesity (BMI 30-39. 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 Garcias Backer      x1 Dr. Frankey Combe, Dr Shavonne Baltazar  08/10/2021    [facility]    DIAGNOSTIC CARDIAC CATH LAB PROCEDURE  10/02/2019    DIALYSIS CATHETER INSERTION Left 2022    Tunneled Symetrex 15.5F x 23cm hemodialysis catheter inserted by Dr. Michel Barreto, TOTAL ABDOMINAL (CERVIX REMOVED)      one ovary intact, Dr Payton Cesar, menorrhagia    IR THROMBECTOMY PERCUT AVF  2022    IR THROMBECTOMY PERCUT AVF 2022 [facility] SPECIAL PROCEDURE    IR TUNNELED CATHETER PLACEMENT GREATER THAN 5 YEARS  2022    IR TUNNELED CATHETER PLACEMENT GREATER THAN 5 YEARS 6/3/2022 [facility] PROCEDURE    IR TUNNELED CATHETER PLACEMENT GREATER THAN 5 YEARS Left 2022    15.5 fr by 23 cm Symetrex dialysis catheter inserted by Dr Rylee Champagne IR TUNNELED Jesus Ahle 5 YEARS  2022    IR TUNNELED CATHETER PLACEMENT GREATER THAN 5 YEARS 2022 [facility] SPECIAL PROCEDURE    UT TOTAL KNEE ARTHROPLASTY Left 2018    LEFT KNEE TOTAL KNEE ARTHROPLASTY, SHAYNA, NERVE BLOCK performed by Deanna Bazzi MD at [address] [place] [address] PTCA      TOE AMPUTATION Right     TOTAL KNEE ARTHROPLASTY  2016    Dr Omega Smith TUNNELED [address] Right 2020    tunneled HD catheter per Dr Carolyn Bernstein Codeine Hives     hives    Oxycontin [Oxycodone Hcl] Hives       Date of Onset: 2022  Rehab Diagnosis: Septicemia    Date of Evaluation: 7/10/2022   Evaluating Therapist: Shabbir Henry, SLP    Dysphagia Diagnosis  Dysphagia Diagnosis: Mild oral stage dysphagia;Mild pharyngeal stage dysphagia  Dysphagia Impression : Clinical indicators of oral and pharyngeal dysphagia noted at bedside, likely acute related to weakness associated with septicemia. Prognosis for improvement is good given patient's current clinical presentation. A modified PO diet is recommended. An instrumental swallowing procedure is not recommended at this time but will be re-assessd an ongoing basis. Recommended Diet  Recommendations: Dysphagia treatment  Diet Solids Recommendation: Soft & Bite Sized  Liquid Consistency Recommendation: Thin  Recommended Form of Meds: PO  Compensatory Swallowing Strategies : Upright as possible for all oral intake;Small bites/sips;Swallow 2 times per bite/sip    Reason for Referral  Breanna Coleman was referred for a bedside swallow evaluation to assess the efficiency of her swallow function, identify signs and symptoms of aspiration, identify risk factors, and make recommendations regarding safe dietary consistencies, effective compensatory strategies, and safe eating environment. General  Chart Reviewed: Yes  Subjective  Subjective: MBS completed 7/1/2022 with recommended diet of minced & moist solids/thin liquids. Patient later upgraded to soft & bite sized solids. Behavior/Cognition: Alert; Cooperative;Pleasant mood  Respiratory Status: Room air  O2 Device: None (Room air)  Communication Observation: Functional  Follows Directions: Simple  Dentition: Edentulous  Patient Positioning: Upright in bed  Baseline Vocal Quality: Dysphonic;Hoarse (patient reports this is not new)  Prior Dysphagia History: None documented in history but patient reports some difficulty swallowing various consistencies \"for a while\" prior to admission. Consistencies Administered: Thin - straw;Regular; Soft and Bite-Sized    Current Diet level  Current Diet : Regular  Current Liquid Diet : Thin    Oral Motor  Labial: No impairment  Dentition: Edentulous  Oral Hygiene: Moist;Clean  Lingual: Decreased strength  Velum: No Impairment  Mandible: No impairment  Gag:  (DNT)    Oral/Pharyngeal Phase  Oral Phase - Comment: Mild oral dysphagia characterized by decreased lingual strength with suspected delayed A-P transit and bolus control with premature bolus loss to the pharynx also suspected. Extended mastication time per regular solids. Mild residuals observed throughout the oral cavity post swallow per regular solids that the patient cleared independently given extended time. Pharyngeal Phase: Suspected pharyngeal dysphagia characterized by double swallow noted per all consistencies trialed. Recent MBS notes mild pharyngeal dysphagia due to pharyngeal residue. No overt s/s of aspiration observed per all consistencies tested. Hyolaryngeal movement is present. PO Trials  Neuromuscular Estim Used: No  Assessment Method(s): Observation  Patient Position: Upright in bed  Vocal Quality: Hoarse  Consistency Presented: Thin  How Presented: Self-fed/presented;Cup/sip  How much presented:  (3oz total)  Bolus Acceptance: No impairment  Bolus Formation/Control: Impaired  Type of Impairment: Suspected premature spilling  Oral Residue: None  Initiation of Swallow:  (no impairment suspected)  Laryngeal Elevation:  (present)  Aspiration Signs/Symptoms: None  Pharyngeal Phase Characteristics: Double swallow  Effective Modifications: Small sips and bites; Double swallow  Cues for Modifications: None  Additional PO Trials: 2    PO Trials 2  Consistency Presented: Regular  How Presented: Self-fed/presented  How much presented:  (1/2 package jennifer cracker)  Bolus Acceptance: No impairment  Bolus Formation/Control: Impaired  Type of Impairment: Delayed; Anterior;Posterior;Mastication  Oral Residue: 10-50% of bolus  Initiation of Swallow:  (no impairment suspected)  Pharyngeal Phase Characteristics: Double swallow  Effective Modifications: Small sips and bites; Double swallow  Cues for Modifications: None      PO Trials 3  Consistency Presented: Soft & Bite Sized  How Presented: Self-fed/presented  How much presented:  (1/2 fruit cup)  Bolus Acceptance: No impairment  Bolus Formation/Control: No impairment  Propulsion: Tongue pumping  Oral Residue: Less than 10% of bolus  Initiation of Swallow:  (no impairment suspected)  Aspiration Signs/Symptoms: None  Pharyngeal Phase Characteristics: Double swallow  Effective Modifications: Small sips and bites; Double swallow  Cues for Modifications: None    Dysphagia Diagnosis  Dysphagia Diagnosis: Mild oral stage dysphagia;Mild pharyngeal stage dysphagia  Dysphagia Impression : Clinical indicators of oral and pharyngeal dysphagia noted at bedside, likely acute related to weakness associated with septicemia. Prognosis for improvement is good given patient's current clinical presentation. A modified PO diet is recommended. An instrumental swallowing procedure is not recommended at this time but will be re-assessd an ongoing basis. Dysphagia Outcome Severity Scale: Level 5: Mild dysphagia- Distant supervision. May need one diet consistency restricted     Recommendations  Requires SLP Intervention: Yes  Recommendations: Dysphagia treatment  D/C Recommendations: Ongoing speech therapy is recommended during this hospitalization  Diet Solids Recommendation: Soft & Bite Sized  Liquid Consistency Recommendation: Thin  Compensatory Swallowing Strategies : Upright as possible for all oral intake;Small bites/sips;Swallow 2 times per bite/sip  Recommended Form of Meds: PO  Therapeutic Interventions: Bolus control exercises; Pharyngeal exercises;Diet tolerance monitoring;Patient/Family education  Duration of Treatment: LOS or until goals are met  Frequency of Treatment: 2-3x/week    Prognosis  Speech Therapy Prognosis  Prognosis: Good  Prognosis Considerations: Age; Motivation;Participation Level    Education  Individuals consulted  Consulted and agree with results and recommendations: Patient;RN  RN Name: FLAQUITO Tillman    [x]  Mary Tillman RN notified   [x]  Dr. Belen Foster notified via voicemail  [x]  OT/PT Departments

## 2022-07-10 NOTE — PLAN OF CARE
setting. See evaluation for individualized goals. 7/10/2022 1051 by ANN MARIE Huerta  Outcome: Progressing     Problem: ABCDS Injury Assessment  Goal: Absence of physical injury  Outcome: Progressing  Flowsheets  Taken 7/10/2022 0202 by Charu Encarnacion. Ruth Medley RN  Absence of Physical Injury: Implement safety measures based on patient assessment  Taken 7/10/2022 0052 by Charu Encarnacion.  Ruth Medley RN  Absence of Physical Injury: Implement safety measures based on patient assessment

## 2022-07-10 NOTE — PROGRESS NOTES
Facility/Department: [facility] Initial Assessment: Physical Therapy  Room: R248/R248-01    NAME: Maritza Vaughn  : 1960  MRN: [id number]    Date of Service: 7/10/2022    Rehab Diagnosis(es):    Patient Active Problem List    Diagnosis Date Noted    Unstable angina ([address] 2022    Chest pain     Chronic hepatitis C ([address] 07/10/2022    Septicemia ([address] 2022    Sarcopenia 2022    Fall from standing     DJD (degenerative joint disease), cervical 2022    Closed head injury     MRSA bacteremia 2022    Sepsis due to Enterococcus ([address]     Local infection due to central venous catheter     Impaired mobility and activities of daily living dt polyneuropathy and reccent fall  2022    Closed T11 fracture (HCC) 2022    Encephalopathy acute 2022    Unspecified open wound of right upper arm, initial encounter 2022    Hyperkalemia 2022    CKD (chronic kidney disease) stage 4, GFR 15-29 ml/min ([facility] 2022    Dialysis patient ([address] 2022    Multiple closed fractures of right lower extremity and ribs 2022    NSTEMI (non-ST elevated myocardial infarction) ([address] 2022    Anginal chest pain at rest [facility]) 2022    Hemodialysis-associated hypotension 10/22/2021    Chronic pain of both shoulders 2021    Nonrheumatic mitral valve regurgitation 2021    Nonrheumatic tricuspid valve regurgitation 2021    Need for extended care facility 2021    Multiple closed fractures of ribs of right side 2021    Depression 2021    Chronic obstructive pulmonary disease ([address] 2021    Critical illness polyneuropathy ([address] 2021    Compression fracture of spine ([facility] 2021    Closed rib fracture 2021    Chest wall contusion, left, initial encounter 2021    Falls frequently 2021    Post PTCA     Headache, unspecified 2021    COVID-19 2020  Moderate persistent asthma without complication 07/87/8072    Weakness 08/05/2020    Difficulty in walking 07/27/2020    Atherosclerotic heart disease of native coronary artery with unspecified angina pectoris ([address] 07/03/2020    Essential (primary) hypertension 07/03/2020    Pain, unspecified 07/03/2020    ESRD (end stage renal disease) on dialysis ([address] 07/03/2020    Other seizures ([address] 07/03/2020    Paranoid schizophrenia ([address] 07/03/2020    Renal failure 06/28/2020    Recurrent falls 06/16/2020    Edema 12/23/2019    Closed supracondylar fracture of right humerus 12/23/2019    Other chronic pain 12/23/2019    Palliative care patient 12/23/2019    Class 2 severe obesity with serious comorbidity and body mass index (BMI) of 36.0 to 36.9 in adult [facility]) 12/03/2019    Sleep apnea, unspecified 11/05/2019    Pulmonary hypertension, unspecified ([address] 11/05/2019    Chronic diastolic congestive heart failure ([address] 10/04/2019    Shortness of breath 10/02/2019    Tardive dyskinesia 05/16/2019    Angina, class II ([address]     Controlled type 2 diabetes mellitus with diabetic neuropathy, with long-term current use of insulin ([address] 02/24/2017    Other specified diabetes mellitus with diabetic neuropathy, unspecified ([address] 08/04/2016    Stented coronary artery-plan is to stay on Plavix indefinately per Dr Frankey Combe 06/02/2016    History of seizures     Urinary incontinence due to cognitive impairment     History of type C viral hepatitis     Diabetic nephropathy with proteinuria ([address]     Incontinence of urine 04/07/2014    Vitamin D insufficiency 02/04/2014    Vitamin B 12 deficiency 06/05/2013    Cerebral microvascular disease 06/05/2013    Hemiparesis, left ([address] 01/01/2013    Schizophrenia, paranoid, chronic     Metabolic syndrome     Mixed hyperlipidemia 12/09/2010    Other hammer toe (acquired) 12/13/2007       Past Medical History:   Diagnosis Date    Angina at rest [facility]) 5/24/2020    Anxiety     CAD S/P percutaneous coronary angioplasty 2015, 2018    stents per dr Lavelle Springer    CHF (congestive heart failure) ([address]     CKD (chronic kidney disease) stage 4, GFR 15-29 ml/min ([address] 2/24/2018    CKD stage 4 due to type 2 diabetes mellitus ([address]     Contusion of right chest wall 2/16/2021    COPD (chronic obstructive pulmonary disease) ([address]     Diabetic nephropathy with proteinuria ([address] 2014    DJD (degenerative joint disease) of knee     Dr Walter Slater GERD (gastroesophageal reflux disease)     Hemiparesis, left ([address] 2013    entered Assisted Living ([facility])    Hemodialysis patient [facility])     Hemodialysis-associated hypotension 10/22/2021    History of heart failure     History of seizures     History of type C viral hepatitis     HTN (hypertension)     Hyperlipidemia     Impaired mobility and activities of daily living     [facility]    Metabolic syndrome     Moderate persistent asthma without complication 5/17/9624    Need for extended care facility 7/7/2021    Neurogenic urinary incontinence 2013    Neuropathy in diabetes [facility])     Nonrheumatic mitral valve regurgitation 7/7/2021    Nonrheumatic tricuspid valve regurgitation 7/7/2021    Obesity (BMI 30-39. 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 Letty Deshpande      x1 Dr. Andreina Cordero, Dr Diaz Distance  08/10/2021    [facility]    DIAGNOSTIC CARDIAC CATH LAB PROCEDURE  10/02/2019    DIALYSIS CATHETER INSERTION Left 2022    Tunneled Symetrex 15.5F x 23cm hemodialysis catheter inserted by Dr. Stormy Yoder, TOTAL ABDOMINAL (CERVIX REMOVED)      one ovary intact, Dr Shirley Jovel, menorrhagia    IR THROMBECTOMY PERCUT AVF  2022    IR THROMBECTOMY PERCUT AVF 2022 [facility] SPECIAL PROCEDURE    IR TUNNELED CATHETER PLACEMENT GREATER THAN 5 YEARS  2022    IR TUNNELED CATHETER PLACEMENT GREATER THAN 5 YEARS 6/3/2022 [facility] SPECIAL PROCEDURE    IR TUNNELED CATHETER PLACEMENT GREATER THAN 5 YEARS Left 2022    15.5 fr by 23 cm Symetrex dialysis catheter inserted by Dr Melody Vega IR TUNNELED [employer] 5 YEARS  2022    IR TUNNELED CATHETER PLACEMENT GREATER THAN 5 YEARS 2022 [facility] SPECIAL PROCEDURE    NV TOTAL KNEE ARTHROPLASTY Left 2018    LEFT KNEE TOTAL KNEE ARTHROPLASTY, SHAYNA, NERVE BLOCK performed by Edgard Goetz MD at [address] [place] [address] PTCA      TOE AMPUTATION Right     TOTAL KNEE ARTHROPLASTY  2016    Dr Rena Clark TUNNELED [address] Right 2020    tunneled HD catheter per Dr Avril Gerard       Patient assessed for rehabilitation services?: Yes  Family / Caregiver Present: No    Restrictions:  Restrictions/Precautions: Fall Risk (high phillips score)  Position Activity Restriction  Other position/activity restrictions: L Rib fractures - 10 and 11 per patient     SUBJECTIVE: Subjective:  \"Im going to take a nap\"    Pain  6/10 L shoulder and B knees- pt stated \"OA pain\"    Prior Level of Function:  Social/Functional History  Lives With: Daughter (pt states she is never home alone)  Type of Home: [facility] Layout: Two level,Able to Live on Main level with bedroom/bathroom  Home Access: Ramped entrance  Bathroom Shower/Tub:  (washes up at sink)  Bathroom Toilet: Standard  Home Equipment: Salazar Dessert, rolling,Hospital bed  [employer] Help From: Family (dtr is official HHA)  ADL Assistance: Needs assistance (assist with socks/brief/pans over feet, bra. Assist for hygiene after BM and with bathing)  Bath: Moderate assistance  Dressing:  Moderate assistance  Grooming: Independent  Feeding: Independent  Toileting: Needs assistance  Homemaking Assistance: Needs assistance (dtr completes cooking and cleaning)  Homemaking Responsibilities: No  Ambulation Assistance: Independent (rollator)  Active : No (daughter and public transportation)  Education: 12th grade  Occupation: On disability  Type of Occupation: nurses aide prior  Leisure & Hobbies: watch TV    OBJECTIVE:   Vision/Hearing:  Vision  Vision Exceptions: Wears glasses for distance (patient reports she wears glassess for tv)  Hearing: Within functional limits    Cognition/Observation:  Overall Orientation Status: Within Normal Limits  Follows Commands: Within Functional Limits    Observation/Palpation  Posture: Fair  Observation: pt pleasant and agreeable to therapy, port for dialysis in R upper chest, rollator in room    ROM:  RLE AROM: WFL  LLE AROM : WFL    Strength:  Strength RLE  Comment: grossly 4/5  Strength LLE  Comment: grossly 4/5    Neuro:  Balance  Posture: Fair  Sitting - Static: Good  Sitting - Dynamic: Good  Standing - Static: Fair;-  Standing - Dynamic: Fair;-    Bed mobility  Rolling to Left: Modified independent  Rolling to Right: Modified independent  Supine to Sit: Stand by assistance  Sit to Supine: Stand by assistance  Bed Mobility Comments: HOB flat; no rail used; increased time and effort    Transfers  Sit to Stand: Stand by assistance  Stand to sit: Stand by assistance  Bed to Chair: Stand by assistance  Comment: rollator used    Ambulation  Surface: level tile  Device: Rollator  Assistance: Stand by assistance  Quality of Gait: wide JAKE  Gait Deviations: Slow Tere;Decreased step length;Decreased step height  Distance: 60ft- pt exhibiting fatigue with this distance    Stairs/Curb  Stairs?: No    Activity Tolerance  Activity Tolerance: Patient limited by fatigue;Patient limited by endurance    Patient Education  Education Given To: Patient  Education Provided: Role of Therapy;Plan of Care  Education Method: Verbal  Education Outcome: Continued education needed    Quality Indicators (IRF-RUBIA):  Rolling L and R: Independent - 6  Sit>Supine: Supervision or Touching Assistance - 4  Supine>Sit: Supervision or Touching Assistance - 4  Sit>Stand: Supervision or Touching Assistance - 4  Chair/Bed>Chair Transfer: Setup or Clean-up Assistance - 5  Car Transfers: Not attempted due to Medical Condition or Safety Concerns (I.e. unsafe or physician orders) - 80  Walk 10 ft: Supervision or Touching Assistance - 4  Walk 50 ft with two 90 degree turns: Supervision or Touching Assistance - 4  Walk 150 ft in [address]: Not attempted due to Medical Condition or Safety Concerns (I.e. unsafe or physician orders) - 80  Walking 10 ft on Unlevel Surface: Not attempted due to Medical Condition or Safety Concerns (I.e. unsafe or physician orders) - 80  Picking up Objects from Standing Position: Not attempted due to Medical Condition or Safety Concerns (I.e. unsafe or physician orders) - 80  Stairs: No Not Applicable (pt did not complete item prior to admission) - 9  WC Mobility: No Not Applicable (pt did not complete item prior to admission) - 9    ASSESSMENT:  Body Structures, Functions, Activity Limitations Requiring Skilled Therapeutic Intervention: Decreased functional mobility ; Decreased ADL status; Decreased ROM; Decreased balance;Decreased strength;Decreased posture; Increased pain  Decision Making: Medium Complexity  History: high  Exam: med  Clinical Presentation: med    Therapy Prognosis: Good      CLINICAL IMPRESSION: Pt is 57 y.o. female to hospital due to septicemia. Pt exhibits decreased strength, balance, posture and increased pain which increases pt need for assistance with mobility to decreased risk for falls at home. Continued PT is required for safe d/c home with family. PLAN OF CARE:  Frequency: 1-2 treatment sessions per day, 5-7 days per week     Current Treatment Recommendations: Strengthening,ROM,Balance training,Functional mobility training,Transfer training,Stair training,Gait training,Manual Therapy - Soft Tissue Mobilization,Manual Therapy - Joint Manipulation,Manual lymphatic drainage,Home exercise program,Safety education & training,Patient/Caregiver education & training    Patient's Goal:  Improve function    GOALS:  Patient goals : Improve function  Short Term Goals  Short term goal 1: Indep HEP for symptom mgmt  Long Term Goals  Long term goal 1: Indep bed mobility  Long term goal 2: Indep transfers bed to chair with safest assistive device  Long term goal 3: Ambulate with rollator Indep on level and ramp surfaces >/= 50' to allow safe return home. Long term goal 4: Pt will demonstrate improved score on [place] balance assessment 48/56 for decreased risk for falls.     ELOS:   Plan weeks: 1 week    Therapy Time:    Individual   Time In 1105   Time Out 1135   Minutes 30      eval X 30 min       Jennifer Gonsalez PT, 07/10/22 at 11:53 AM

## 2022-07-10 NOTE — PLAN OF CARE
Nutrition Problem #1: Altered nutrition-related lab values  Intervention: Food and/or Nutrient Delivery: Modify Current Diet,Continue Oral Nutrition Supplement  Nutritional  Goals: Po intake >75%.  Gluc <160

## 2022-07-10 NOTE — CONSULTS
Hospitalist Consult/Progress Note  7/10/2022 7:56 AM    Assessment and Plan:   1. Generalized weakness, Gait instability and Decreased Functional Status secondary to falls: Acute R 10th and 11th rib fractures following fall. MRI brain negative. MRI lumbar spine showed DDD. MRI C spine showed severe stenosis. Seen by Sandee Bauman and neurology. Conservative therapy. Fall precautions. PT OT to evaluate. Maximize nutrition status. Assessing if needs DME at home. SW on board. 2. E. Faecalis BSI: NOELLE negative per cardiology. Repeat blood cultures no growth. Plan is continue IV vancomycin with HD 3x/week per ID.   3. ESRD: On HD. New TDC placed on 7/7. Management per nephrology. Midodrine on HD days. 4. CAD/HTN: S/p CABG/PCI, s/p TV repair. Continue ASA, imdur and statin  5. Hx CVA: residual L sided weakness. Continue ASA and statin. 6. IDDM2: POCT ACHS, SSI, basal insulin. Hypoglycemia protocol. 7. Anemia: H/H stable. 8. Schizophrenia: continue abilify  9. Bowel Regimen and GI PPx: stool softners PRN ordered with hold parameters for loose stools or diarrhea. On antiacid  10. Diet: ADULT DIET; Regular; 1800 ml  11. ADULT ORAL NUTRITION SUPPLEMENT; Breakfast, Lunch, Dinner; Diabetic Oral Supplement  12. Advance Directive: Full Code   13. Nutrition status: Supplemental Vitamins ordered. Dietitian assessment  14. Vaccinations: Immunization records reviewed. If has not received appropriate vaccinations, will order to be given prior to discharge. 15. DVT prophylaxis: MAHAMED hose  16. Discharge planning: SW on board. 17. High Risk Readmission Screening Tool Score Noted.      Additionally, the following hospital problems were addressed:  Principal Problem:    Impaired mobility and activities of daily living dt polyneuropathy and reccent fall   Active Problems:    CKD (chronic kidney disease) stage 4, GFR 15-29 ml/min (HCC)    Closed T11 fracture (HCC)    Encephalopathy acute    MRSA bacteremia    Fall from standing Septicemia ([facility]    Cerebral microvascular disease    Incontinence of urine    Controlled type 2 diabetes mellitus with diabetic neuropathy, with long-term current use of insulin (HCC)    Chronic diastolic congestive heart failure ([facility]    Pulmonary hypertension, unspecified (HCC)    ESRD (end stage renal disease) on dialysis ([facility]    Chest wall contusion, left, initial encounter    Multiple closed fractures of ribs of right side    Chronic pain of both shoulders  Resolved Problems:    * No resolved hospital problems. *      ** Total time spent reviewing medical records, evaluating patient, speaking with RN's and consultants where I was focused exclusively on this patient: 65 minutes. This time is excluding time spent performing procedures or significant events occurring earlier or later in the day requiring my attention and focus. Subjective:   Admit Date: 7/9/2022  PCP: Renetta Bella MD    57 y.o. female with PMH with history of CAD s/p CABG/PCI, s/p TV repair, CVA with left sided weakness, IDDM2, ESRD on HD, anemia,  schizophrenia, COVID-19 pneumonia, obesity, T11 fracture who presented with weakness and falls. Transferred to acute rehab after inpatient admission for weakness, fall, rib fractures, E. Faecalis BSI, and ESRD. MRI showed moderated to marked central canal stenosis from C4-5. NSGY was consulted and planned for conservative care. Nephrology followed for HD. Required TDC due to clotting of graft. Once medically optimized transferred to acute rehab for further therapies. Patient seen and examined. No acute events overnight. Afebrile. No new complaints. Pt denies chest pain, SOB, N/V, fevers or chills.      Objective:     Vitals:    07/09/22 2035 07/10/22 0501 07/10/22 0629   BP: (!) 126/56  (!) 147/70   Pulse: 91  81   Resp: 18  18   Temp: 98.2 °F (36.8 °C)  97.5 °F (36.4 °C)   SpO2: 98%  99%   Weight: 195 lb (88.5 kg)     Height:  5' 7\" (1.702 m)      General appearance: No acute distress,  No conversational dyspnea noted. Dentition intact. Answers questions appropriately. Chronically ill appearing. Neurological: Alert, awake, and oriented x3. Motor and sensory grossly intact. No focal deficits. GCS of 15. Lungs: CTAB. no exp wheezes, No rales No retractions; No use of accessory muscles  Heart:  S1, S2 normal, RRR, no MRG appreciated  Abdomen: (+) BS, soft, non-tender; non distended no guarding or rigidity. Extremities:  no cyanosis, trace edema,  no calf tenderness bilaterally. Dry skin noted       Medications:      dextrose        Vitamin D  2,000 Units Oral Dinner    cyanocobalamin  1,000 mcg IntraMUSCular Weekly    coenzyme Q10  100 mg Oral Daily    ARIPiprazole  5 mg Oral Daily    aspirin EC  81 mg Oral Daily    atorvastatin  40 mg Oral Nightly    insulin glargine  35 Units SubCUTAneous Nightly    insulin lispro  0-12 Units SubCUTAneous TID WC    insulin lispro  0-6 Units SubCUTAneous Nightly    isosorbide mononitrate  120 mg Oral Daily    lidocaine  3 patch TransDERmal Daily    magnesium oxide  400 mg Oral Daily    melatonin  10 mg Oral Nightly    miconazole   Topical BID    [START ON 7/11/2022] midodrine  5 mg Oral Once per day on Mon Wed Fri    polyethylene glycol  17 g Oral Daily    sertraline  50 mg Oral Nightly    vancomycin (VANCOCIN) intermittent dosing (placeholder)   Other RX Placeholder       LABS Reviewed    IMAGING Reviewed    LORETO Valencia - ASAD  Rounding Hospitalist    Additional work up or/and treatment plan may be added today or then after based on clinical progression. I am managing a portion of pt care. Some medical issues are handled by other specialists and Primary Rehabilitation provider. Additional work up and treatment should be done in out pt setting by pt PCP and other out pt providers.

## 2022-07-10 NOTE — PROGRESS NOTES
Nephrology Progress Note    Assessment:  ESRDX   Clotted graft  Weakness  Tricuspid surgery  VRE  Hx Hepatitis-C  Hypertension  Anemia  OHD CAD      Plan:   Will do  Dialysis tomorrow  Orders placed     Patient Active Problem List:     Atherosclerotic heart disease of native coronary artery with unspecified angina pectoris (HCC)     Schizophrenia, paranoid, chronic     Metabolic syndrome     Vitamin B 12 deficiency     Cerebral microvascular disease     Mixed hyperlipidemia     Other hammer toe (acquired)     Vitamin D insufficiency     Incontinence of urine     Diabetic nephropathy with proteinuria ([address]     Essential (primary) hypertension     History of type C viral hepatitis     Urinary incontinence due to cognitive impairment     History of seizures     Stented coronary artery-plan is to stay on Plavix indefinately per Dr Vy Savage     Other specified diabetes mellitus with diabetic neuropathy, unspecified ([address]     Controlled type 2 diabetes mellitus with diabetic neuropathy, with long-term current use of insulin (HCC)     Hemiparesis, left (HCC)     Angina, class II (HCC)     Pain, unspecified     Tardive dyskinesia     Shortness of breath     Chronic diastolic congestive heart failure (HCC)     Sleep apnea, unspecified     Pulmonary hypertension, unspecified (HCC)     Class 2 severe obesity with serious comorbidity and body mass index (BMI) of 36.0 to 36.9 in adult [facility])     Edema     Closed supracondylar fracture of right humerus     Other chronic pain     Palliative care patient     Recurrent falls     Renal failure     Difficulty in walking     ESRD (end stage renal disease) on dialysis ([address]     Weakness     Other seizures (HCC)     Moderate persistent asthma without complication     [id number]     Post PTCA     Falls frequently     Chest wall contusion, left, initial encounter     Headache, unspecified     Paranoid schizophrenia ([address]     Compression fracture of spine ([address]     Closed rib fracture Depression     Chronic obstructive pulmonary disease (HCC)     Critical illness polyneuropathy ([facility])     Multiple closed fractures of ribs of right side     Nonrheumatic mitral valve regurgitation     Nonrheumatic tricuspid valve regurgitation     Need for extended care facility     Chronic pain of both shoulders     Hemodialysis-associated hypotension     Anginal chest pain at rest [facility])     Chest pain     Unstable angina ([facility])     NSTEMI (non-ST elevated myocardial infarction) ([facility])     CKD (chronic kidney disease) stage 4, GFR 15-29 ml/min ([facility])     Hyperkalemia     Impaired mobility and activities of daily living dt polyneuropathy and reccent fall      Dialysis patient [facility])     Unspecified open wound of right upper arm, initial encounter     Multiple closed fractures of right lower extremity and ribs     Closed T11 fracture ([facility])     Encephalopathy acute     MRSA bacteremia     Sepsis due to Enterococcus ([address]     Local infection due to central venous catheter     DJD (degenerative joint disease), cervical     Closed head injury     Sarcopenia     Fall from standing     Septicemia ([address]     Chronic hepatitis C ([facility]      Subjective:  Admit Date: 7/9/2022    Interval History: no issues diet fine    Medications:  Scheduled Meds:   Vitamin D  2,000 Units Oral Dinner    cyanocobalamin  1,000 mcg IntraMUSCular Weekly    coenzyme Q10  100 mg Oral Daily    ARIPiprazole  5 mg Oral Daily    aspirin EC  81 mg Oral Daily    atorvastatin  40 mg Oral Nightly    insulin glargine  35 Units SubCUTAneous Nightly    insulin lispro  0-12 Units SubCUTAneous TID WC    insulin lispro  0-6 Units SubCUTAneous Nightly    isosorbide mononitrate  120 mg Oral Daily    lidocaine  3 patch TransDERmal Daily    magnesium oxide  400 mg Oral Daily    melatonin  10 mg Oral Nightly    miconazole   Topical BID    [START ON 7/11/2022] midodrine  5 mg Oral Once per day on Mon Wed Fri    polyethylene glycol  17 g Oral Daily    sertraline  50 mg Oral Nightly    vancomycin (VANCOCIN) intermittent dosing (placeholder)   Other RX Placeholder     Continuous Infusions:   dextrose         CBC:   Recent Labs     07/09/22  0527 07/10/22  0443   WBC 9.6 9.4   HGB 9.8* 9.7*    278     CMP:    Recent Labs     07/08/22  0632 07/09/22  0527 07/09/22 1953   * 130* 132*   K 5.1* 4.6 4.7   CL 96 92* 91*   CO2 23 27 26   BUN 30* 18 25*   CREATININE 4.05* 2.79* 3.43*   GLUCOSE 82 122* 224*   CALCIUM 10.2* 10.0* 9.8   LABGLOM 11.1* 17.1* 13.4*     Troponin: No results for input(s): TROPONINI in the last 72 hours. BNP: No results for input(s): BNP in the last 72 hours. INR: No results for input(s): INR in the last 72 hours. Lipids: No results for input(s): CHOL, LDLDIRECT, TRIG, HDL, AMYLASE, LIPASE in the last 72 hours. Liver:   Recent Labs     07/09/22 1953   LABALBU 3.9     Iron:  No results for input(s): IRONS, FERRITIN in the last 72 hours. Invalid input(s): LABIRONS  Urinalysis: No results for input(s): UA in the last 72 hours.     Objective:  Vitals: BP (!) 147/70   Pulse 81   Temp 97.5 °F (36.4 °C)   Resp 18   Ht 5' 7\" (1.702 m)   Wt 195 lb (88.5 kg)   LMP  (LMP Unknown)   SpO2 99%   BMI 30.54 kg/m²    Wt Readings from Last 3 Encounters:   07/09/22 195 lb (88.5 kg)   07/08/22 189 lb 9.5 oz (86 kg)   06/22/22 217 lb (98.4 kg)      24HR INTAKE/OUTPUT:  No intake or output data in the 24 hours ending 07/10/22 0819    General: alert, in no apparent distress  HEENT: normocephalic, atraumatic, anicteric  Catheter left chest  Neck: supple, no mass  Lungs: non-labored respirations, clear to auscultation bilaterally  Heart: regular rate and rhythm, no murmurs or rubs  Abdomen: soft, non-tender, non-distended  Ext: no cyanosis, no peripheral edema  Neuro: alert and oriented, no gross abnormalities  Psych: normal mood and affect  Skin: no rash      Electronically signed by Shiva Kumar DO, MD

## 2022-07-10 NOTE — PROGRESS NOTES
Facility/Department: [facility] Initial Assessment: Occupational Therapy  Room: R248/R248-01    NAME: Adin Kaplan  : 1960  MRN: [id number]    Date of Service: 7/10/2022    Rehab Diagnosis(es):    Patient Active Problem List    Diagnosis Date Noted    Unstable angina ([address] 2022    Chest pain     Chronic hepatitis C ([address] 07/10/2022    Septicemia ([address] 2022    Sarcopenia 2022    Fall from standing     DJD (degenerative joint disease), cervical 2022    Closed head injury     MRSA bacteremia 2022    Sepsis due to Enterococcus ([address]     Local infection due to central venous catheter     Impaired mobility and activities of daily living dt polyneuropathy and reccent fall  2022    Closed T11 fracture (HCC) 2022    Encephalopathy acute 2022    Unspecified open wound of right upper arm, initial encounter 2022    Hyperkalemia 2022    CKD (chronic kidney disease) stage 4, GFR 15-29 ml/min ([address] 2022    Dialysis patient ([address] 2022    Multiple closed fractures of right lower extremity and ribs 2022    NSTEMI (non-ST elevated myocardial infarction) ([address] 2022    Anginal chest pain at rest [facility]) 2022    Hemodialysis-associated hypotension 10/22/2021    Chronic pain of both shoulders 2021    Nonrheumatic mitral valve regurgitation 2021    Nonrheumatic tricuspid valve regurgitation 2021    Need for extended care facility 2021    Multiple closed fractures of ribs of right side 2021    Depression 2021    Chronic obstructive pulmonary disease ([address] 2021    Critical illness polyneuropathy ([address] 2021    Compression fracture of spine ([address] 2021    Closed rib fracture 2021    Chest wall contusion, left, initial encounter 2021    Falls frequently 2021    Post PTCA     Headache, unspecified 2021    COVID-19 12/26/2020    Moderate persistent asthma without complication 28/92/5489    Weakness 08/05/2020    Difficulty in walking 07/27/2020    Atherosclerotic heart disease of native coronary artery with unspecified angina pectoris ([facility] 07/03/2020    Essential (primary) hypertension 07/03/2020    Pain, unspecified 07/03/2020    ESRD (end stage renal disease) on dialysis ([address] 07/03/2020    Other seizures ([address] 07/03/2020    Paranoid schizophrenia ([facility] 07/03/2020    Renal failure 06/28/2020    Recurrent falls 06/16/2020    Edema 12/23/2019    Closed supracondylar fracture of right humerus 12/23/2019    Other chronic pain 12/23/2019    Palliative care patient 12/23/2019    Class 2 severe obesity with serious comorbidity and body mass index (BMI) of 36.0 to 36.9 in adult [facility]) 12/03/2019    Sleep apnea, unspecified 11/05/2019    Pulmonary hypertension, unspecified ([facility] 11/05/2019    Chronic diastolic congestive heart failure ([address] 10/04/2019    Shortness of breath 10/02/2019    Tardive dyskinesia 05/16/2019    Angina, class II ([address]     Controlled type 2 diabetes mellitus with diabetic neuropathy, with long-term current use of insulin ([address] 02/24/2017    Other specified diabetes mellitus with diabetic neuropathy, unspecified ([facility] 08/04/2016    Stented coronary artery-plan is to stay on Plavix indefinately per Dr Dinora Driver 06/02/2016    History of seizures     Urinary incontinence due to cognitive impairment     History of type C viral hepatitis     Diabetic nephropathy with proteinuria ([address]     Incontinence of urine 04/07/2014    Vitamin D insufficiency 02/04/2014    Vitamin B 12 deficiency 06/05/2013    Cerebral microvascular disease 06/05/2013    Hemiparesis, left ([address] 01/01/2013    Schizophrenia, paranoid, chronic     Metabolic syndrome     Mixed hyperlipidemia 12/09/2010    Other hammer toe (acquired) 12/13/2007       Past Medical History:   Diagnosis Date    Angina at rest [facility]) 5/24/2020    Anxiety     CAD S/P percutaneous coronary angioplasty 2015, 2018    stents per dr Hieu Pearson    CHF (congestive heart failure) ([address]     CKD (chronic kidney disease) stage 4, GFR 15-29 ml/min ([address] 2/24/2018    CKD stage 4 due to type 2 diabetes mellitus ([address]     Contusion of right chest wall 2/16/2021    COPD (chronic obstructive pulmonary disease) ([address]     Diabetic nephropathy with proteinuria ([address] 2014    DJD (degenerative joint disease) of knee     Dr Elie Ward GERD (gastroesophageal reflux disease)     Hemiparesis, left ([address] 2013    entered Assisted Living ([facility])    Hemodialysis patient [facility])     Hemodialysis-associated hypotension 10/22/2021    History of heart failure     History of seizures     History of type C viral hepatitis     HTN (hypertension)     Hyperlipidemia     Impaired mobility and activities of daily living     Mediastinal lymphadenopathy 2013    Maritza Tee    Metabolic syndrome     Moderate persistent asthma without complication 2/17/1450    Need for extended care facility 7/7/2021    Neurogenic urinary incontinence 2013    Neuropathy in diabetes [facility])     Nonrheumatic mitral valve regurgitation 7/7/2021    Nonrheumatic tricuspid valve regurgitation 7/7/2021    Obesity (BMI 30-39. 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 Brian Mckee      x1 Dr. Jeff Blackburn, Dr Linda Busby  08/10/2021    [facility]    DIAGNOSTIC CARDIAC CATH LAB PROCEDURE  10/02/2019    DIALYSIS CATHETER INSERTION Left 2022    Tunneled Symetrex 15.5F x 23cm hemodialysis catheter inserted by Dr. Kevin Wolff, TOTAL ABDOMINAL (CERVIX REMOVED)      one ovary intact, Dr Hillman Hidden, menorrhagia    IR THROMBECTOMY PERCUT AVF  2022    IR THROMBECTOMY PERCUT AVF 2022 [facility] PROCED[facility]    IR TUNNELED CATHETER PLACEMENT GREATER THAN 5 YEARS  2022    IR TUNNELED CATHETER PLACEMENT GREATER THAN 5 YEARS 6/3/2022 [facility] PROCED[facility]    IR TUNNELED CATHETER PLACEMENT GREATER THAN 5 YEARS Left 2022    15.5 fr by 23 cm Symetrex dialysis catheter inserted by Dr Romy Rivero IR TUNNELED [address] 5 YEARS  2022    IR TUNNELED CATHETER PLACEMENT GREATER THAN 5 YEARS 2022 [facility] SPECIAL PROCEDURE    VA TOTAL KNEE ARTHROPLASTY Left 2018    LEFT KNEE TOTAL KNEE ARTHROPLASTY, SHAYNA, NERVE BLOCK performed by Sonal Limon MD at [address] [place] [address] PTCA      TOE AMPUTATION Right     TOTAL KNEE ARTHROPLASTY  2016    Dr Walter Slater TUNNELED [address] Right 2020    tunneled HD catheter per Dr Amelia Cantor       Restrictions:  Restrictions/Precautions  Restrictions/Precautions: Fall Risk (high phillips score)  Position Activity Restriction  Other position/activity restrictions: L Rib fractures - 10 and 11 per patient    Subjective:  General  Patient assessed for rehabilitation services?: Yes  Referring Practitioner: Dr. Lisa Alvarado  Diagnosis: impaired mobility and ADLs d/t polyneuropathy and recent fall    Patient's date of birth confirmed: Yes     Pain at start of treatment: Yes: 5/10    Pain at end of treatment: Yes: 5/10    Location: R shoulder   Nursing notified: No  Intervention: RN provided pain medication during evaluation     Social Functional:  Social/Functional History  Lives With: Daughter  Type of Home: House  Home Layout: Two level; Able to Live on Main level with bedroom/bathroom  Home Access: Ramped entrance  Bathroom Shower/Tub:  (washes up at sink)  Bathroom Toilet: Standard  Home Equipment: Yonny Au, rolling;Hospital bed  Has the patient had two or more falls in the past year or any fall with injury in the past year?: Yes  Receives Help From: Family (dtr is official HHA)  ADL Assistance: Needs assistance (assist with socks/brief/pans over feet, bra. Assist for hygiene after BM and with bathing)  Bath: Moderate assistance  Dressing: Moderate assistance  Grooming: Independent  Feeding: Independent  Toileting: Needs assistance  Homemaking Assistance: Needs assistance (dtr completes cooking and cleaning)  Homemaking Responsibilities: No  Ambulation Assistance: Independent (rollator)  Active : No (daughter and public transportation)  Education: 12th grade  Occupation: On disability  Type of Occupation: nurses aide prior  Leisure & Hobbies: watch TV    Objective: OT eval completed in pt room     Self Care Status:  ADL  Feeding: Unable to assess(comment)  Feeding Skilled Clinical Factors: pt done with breakfast  Grooming: Setup  UE Bathing: Setup  LE Bathing:  Moderate assistance  LE Bathing Skilled Clinical Factors: assist to wash buttocks and Lower legs  UE Dressing: Setup  LE Dressing: Maximum assistance  LE Dressing Skilled Clinical Factors: assist to thread pant legs and don socks  Toileting: Maximum assistance  Toileting Skilled Clinical Factors: pt with purwick and brief at time of eval, required assistance to clean buttocks of previous BM, declined toileting on toilet  Additional Comments: pts dtr assists with ADLs at home (bathing and dressing LB ADLs and toileting)  Toilet Transfers  Toilet Transfer: Unable to assess  Toilet Transfers Comments: declined need  Shower Transfers  Shower Transfers: Not tested  [facility] [employer] Comments: pt does not shower d/t recent placement of port for dialysis      Functional Mobility:  Balance  Sitting Balance: Supervision  Standing Balance: Stand by assistance  Comment: fatigue with standing  Functional Mobility  Functional - Mobility Device: 4-Wheeled Walker  Activity: To/from bathroom  Assist Level: Stand by assistance    Bed mobility and transfers:  Bed mobility  Supine to Sit: Stand by assistance  Sit to Supine: Stand by assistance  Scooting: Stand by assistance  Transfers  Sit to stand: Stand by assistance  Stand to sit: Stand by assistance      Observation:  Observation/Palpation  Observation: pt pleasant and agreeable to therapy, port for dialysis in R upper chest, rollator in room    Orientation and Cognition:  Orientation  Overall Orientation Status: Within Normal Limits  Cognition  Cognition Comment: comp: SUP, exp: SUP, soc int: MIN A, prob solving: MIN A, mem: SUP    Vision and Hearing:  Vision  Vision Exceptions: Wears glasses at all times (wears only at times)  Hearing  Hearing: Within functional limits  Perception  Overall Perceptual Status: WFL    Range of Motion:  LUE AROM (degrees)  LUE AROM : Exceptions  LUE General AROM: limited shoulder internal rotation to manage pants and shirt at waist line  Left Hand AROM (degrees)  Left Hand AROM: WFL  RUE AROM (degrees)  RUE AROM : Exceptions  RUE General AROM: limited shoulder internal rotation to manage pants and shirt at waist line  Right Hand AROM (degrees)  Right Hand AROM: WFL      Strength:  LUE Strength  Gross LUE Strength: Exceptions to [facility]  LUE Strength Comment: grossly 3+/5  RUE Strength  Gross RUE Strength: Exceptions to [facility]  RUE Strength Comment: grossly 3+/5    Quality of Movement:   Tone RUE  RUE Tone: Normotonic  Tone LUE  LUE Tone: Normotonic  Coordination  Movements Are Fluid And Coordinated: No  Coordination and Movement Description: Fine motor impairments  Quality of Movement Other  Comment: assist to manage fasteners and packages    Sensation:  Sensation  Overall Sensation Status: Impaired (neuropathy in hands and feet)    Hand Dominance  Hand Dominance: Left    Safety:  Safety Devices  Type of Devices: All fall risk precautions in place    Assessment:  Activity Tolerance  Activity Tolerance: Patient limited by fatigue;Patient Tolerated treatment well    Assessment  Performance deficits / Impairments: Decreased functional mobility ; Decreased safe awareness;Decreased balance;Decreased ADL status; Decreased vision/visual deficit; Decreased ROM; Decreased endurance;Decreased high-level IADLs;Decreased strength;Decreased sensation;Decreased fine motor control;Decreased coordination  Assessment: Pt is a 58 y/o female who presents to [address] [place] [address] for medical decline with fall. Pt lives with dtr who assists with ADLs and IADLs PTA. Pt presents with above deficits and impaired ADL status. Pt may benefit from skilled OT intervention for increased indepdnence and safety upon d/c to home  Treatment Diagnosis: impaired mobility and ADLs d/t polyneuropathy and recent fall  Prognosis: Fair  Decision Making: Medium Complexity  History: complex chart review  Exam: 12 deficits  Assistance / Modification: SBA/MAX A  Discharge Recommendations: Continue to assess pending progress       Equipment needed:  OT Equipment Recommendations  Other: continue to assess    OT Education:  Education Given To: Patient  Education Provided: Role of Therapy,Plan of Care  Education Method: Verbal  Barriers to Learning: None  Education Outcome: Verbalized understanding      Plan:  Plan  Times per Week: 5-7x/week  Times per Day: Daily  Plan Weeks: 1 week  Current Treatment Recommendations: Strengthening;Balance training;Functional mobility training; Endurance training; Safety education & training;Patient/Caregiver education & training;Equipment evaluation, education, & procurement;Self-Care / ADL; Home management training    Goals:  Patient goals : work on strengthening my arms and legs, and improve balance so I don't fall         - Feeding: Mod I  Grooming: Mod I  Bathing: Mod I  UE Dressing: Mod I  LE Dressing: Mod I  Toileting: Mod I  Toilet Transfer: Mod I               [x]  Patient will improve static and dynamic standing balance to complete pants management at MOD I  level     [x]  Patient will improve B UE Function (AROM, strength, motor control, tone normalization) to complete ADLs as projected. [x]  Patient will improve functional endurance to tolerate/complete 60 minutes of ADLs. [x]  Patient will improve B UE strength and endurance to [facility] in order to participate in self-care activities as projected. [x]  Patient will improve B hand fine motor coordination to [facility] in order to manage clothing fasteners/self-care containers in a timely manner         [x]  Patient will perform kitchen mobility at device level without episodes of LOB and good safety awareness      [x]  Patient will perform basic room mobility at MOD I level. [x]  Patient and/or caregiver will demonstrate understanding of energy conservation techniques with 1-2 verbal/tactile cues. [x]  Patient and/or caregiver will demonstrate understanding of recommended HEP for BUE STRENGTHENING.         [x]  Patient's cognition will improve to safely perform ADLs:  Comprehension: MOD I   Expression: MOD I   Social Interaction: MOD I   Problem Solving: MOD I   Memory: MOD I       Therapy Time:   Individual   Time In 0815   Time Out 0900   Minutes 45          Eval: 45 minutes     Electronically signed by:    Fidela Cogan, OTR/L,   7/10/2022, 12:37 PM

## 2022-07-10 NOTE — PLAN OF CARE
Problem: Discharge Planning  Goal: Discharge to home or other facility with appropriate resources  7/10/2022 0204 by Cassie Malone. Sandra Moreno RN  Outcome: Progressing  7/10/2022 0204 by Cassie Malone. Alesasndro Brooke RN  Outcome: Progressing     Problem: Safety - Adult  Goal: Free from fall injury  7/10/2022 0204 by Cassie Moreno RN  Outcome: Progressing  7/10/2022 0204 by Cassie Malone. Alessandro Brooke RN  Outcome: Progressing     Problem: Skin/Tissue Integrity  Goal: Absence of new skin breakdown  Description: 1. Monitor for areas of redness and/or skin breakdown  2. Assess vascular access sites hourly  3. Every 4-6 hours minimum:  Change oxygen saturation probe site  4. Every 4-6 hours:  If on nasal continuous positive airway pressure, respiratory therapy assess nares and determine need for appliance change or resting period.   Outcome: Progressing Lamberto Vasquez

## 2022-07-10 NOTE — PROGRESS NOTES
[facility]   Facility/Department: [facility]  Speech Language Pathology  Initial Speech/Language/Cognitive Assessment    NAME:Michelle Shane  : 1960 (57 y.o.)   [x]   confirmed    MRN: [id number]  ROOM: [place] [address]  ADMISSION DATE: 2022  PATIENT DIAGNOSIS(ES): Septicemia ([address] [A41.9]  No chief complaint on file.     Patient Active Problem List    Diagnosis Date Noted    Unstable angina ([address] 2022    Chest pain     Chronic hepatitis C ([address] 07/10/2022    Septicemia ([address] 2022    Sarcopenia 2022    Fall from standing     DJD (degenerative joint disease), cervical 2022    Closed head injury     MRSA bacteremia 2022    Sepsis due to Enterococcus ([address]     Local infection due to central venous catheter     Impaired mobility and activities of daily living dt polyneuropathy and reccent fall  2022    Closed T11 fracture ([address] 2022    Encephalopathy acute 2022    Unspecified open wound of right upper arm, initial encounter 2022    Hyperkalemia 2022    CKD (chronic kidney disease) stage 4, GFR 15-29 ml/min ([address] 2022    Dialysis patient ([address] 2022    Multiple closed fractures of right lower extremity and ribs 2022    NSTEMI (non-ST elevated myocardial infarction) ([address] 2022    Anginal chest pain at rest [facility]) 2022    Hemodialysis-associated hypotension 10/22/2021    Chronic pain of both shoulders 2021    Nonrheumatic mitral valve regurgitation 2021    Nonrheumatic tricuspid valve regurgitation 2021    Need for extended care facility 2021    Multiple closed fractures of ribs of right side 2021    Depression 2021    Chronic obstructive pulmonary disease ([address] 2021    Critical illness polyneuropathy ([address] 2021    Compression fracture of spine [facility] 2021    Closed rib fracture 2021    Chest wall contusion, left, initial encounter 02/18/2021    Falls frequently 01/19/2021    Post PTCA     Headache, unspecified 01/03/2021    COVID-19 12/26/2020    Moderate persistent asthma without complication 37/51/0977    Weakness 08/05/2020    Difficulty in walking 07/27/2020    Atherosclerotic heart disease of native coronary artery with unspecified angina pectoris ([address] 07/03/2020    Essential (primary) hypertension 07/03/2020    Pain, unspecified 07/03/2020    ESRD (end stage renal disease) on dialysis ([address] 07/03/2020    Other seizures ([address] 07/03/2020    Paranoid schizophrenia ([address] 07/03/2020    Renal failure 06/28/2020    Recurrent falls 06/16/2020    Edema 12/23/2019    Closed supracondylar fracture of right humerus 12/23/2019    Other chronic pain 12/23/2019    Palliative care patient 12/23/2019    Class 2 severe obesity with serious comorbidity and body mass index (BMI) of 36.0 to 36.9 in adult [facility]) 12/03/2019    Sleep apnea, unspecified 11/05/2019    Pulmonary hypertension, unspecified ([address] 11/05/2019    Chronic diastolic congestive heart failure ([address] 10/04/2019    Shortness of breath 10/02/2019    Tardive dyskinesia 05/16/2019    Angina, class II ([address]     Controlled type 2 diabetes mellitus with diabetic neuropathy, with long-term current use of insulin ([address] 02/24/2017    Other specified diabetes mellitus with diabetic neuropathy, unspecified ([address] 08/04/2016    Stented coronary artery-plan is to stay on Plavix indefinately per Dr Larry Queen 06/02/2016    History of seizures     Urinary incontinence due to cognitive impairment     History of type C viral hepatitis     Diabetic nephropathy with proteinuria ([address]     Incontinence of urine 04/07/2014    Vitamin D insufficiency 02/04/2014    Vitamin B 12 deficiency 06/05/2013    Cerebral microvascular disease 06/05/2013    Hemiparesis, left ([address] 01/01/2013    Schizophrenia, paranoid, chronic     Metabolic syndrome     Mixed hyperlipidemia 12/09/2010    Other hammer toe (acquired) 12/13/2007     Past Medical History:   Diagnosis Date    Angina at rest [facility]) 5/24/2020    Anxiety     CAD S/P percutaneous coronary angioplasty 2015, 2018    stents per dr Morelia Anderson    CHF (congestive heart failure) ([address]     CKD (chronic kidney disease) stage 4, GFR 15-29 ml/min ([address] 2/24/2018    CKD stage 4 due to type 2 diabetes mellitus ([address]     Contusion of right chest wall 2/16/2021    COPD (chronic obstructive pulmonary disease) ([address]     Diabetic nephropathy with proteinuria ([address] 2014    DJD (degenerative joint disease) of knee     Dr Patrick Fleming GERD (gastroesophageal reflux disease)     Hemiparesis, left ([address] 2013    entered Assisted Living ([facility])    Hemodialysis patient [facility])     Hemodialysis-associated hypotension 10/22/2021    History of heart failure     History of seizures     History of type C viral hepatitis     HTN (hypertension)     Hyperlipidemia     Impaired mobility and activities of daily living     Mediastinal lymphadenopathy 2013    Jazmine Rosales Sidney    Metabolic syndrome     Moderate persistent asthma without complication 7/02/8477    Need for extended care facility 7/7/2021    Neurogenic urinary incontinence 2013    Neuropathy in diabetes [facility])     Nonrheumatic mitral valve regurgitation 7/7/2021    Nonrheumatic tricuspid valve regurgitation 7/7/2021    Obesity (BMI 30-39. 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 Alexandria Jones      x1 Dr. Kennedi Dixon, Dr Yvonne Griffin  08/10/2021    [facility]    DIAGNOSTIC CARDIAC CATH LAB PROCEDURE  10/02/2019    DIALYSIS CATHETER INSERTION Left 2022    Tunneled Symetrex 15.5F x 23cm hemodialysis catheter inserted by Dr. Rashad Carvajal, TOTAL ABDOMINAL (CERVIX REMOVED)      one ovary intact, Dr Tigre Zhang, menorrhagia    IR THROMBECTOMY PERCUT AVF  2022    IR THROMBECTOMY PERCUT AVF 2022 [facility] SPECIAL PROCEDURE    IR TUNNELED CATHETER PLACEMENT GREATER THAN 5 YEARS  2022    IR TUNNELED CATHETER PLACEMENT GREATER THAN 5 YEARS 6/3/2022 [facility] PROCEDURE    IR TUNNELED CATHETER PLACEMENT GREATER THAN 5 YEARS Left 2022    15.5 fr by 23 cm Symetrex dialysis catheter inserted by Dr Kim Vivas IR TUNNELED [address] 5 YEARS  2022    IR TUNNELED CATHETER PLACEMENT GREATER THAN 5 YEARS 2022 [facility] SPECIAL PROCEDURE    [place] TOTAL KNEE ARTHROPLASTY Left 2018    LEFT KNEE TOTAL KNEE ARTHROPLASTY, SHAYNA, NERVE BLOCK performed by Tawny Gilliland MD at [address] [place] [address] PTCA      TOE AMPUTATION Right     TOTAL KNEE ARTHROPLASTY  2016    Dr Jose L Juares TUNNELED [address] Right 2020    tunneled HD catheter per Dr Shauna Nixon         Date of Onset: 2022  Rehab Diagnosis: Septicemia  Date of Evaluation: 7/10/2022   Evaluating Therapist: ANN MARIE Ferreira      Diagnosis: Mild cognitive-linguistic impairment characterized by impaired short-term memory, working memory, divergent naming, abstract reasoning, high level problem solving, divided and alternating attention, and decreased safety awareness; Mild dysphonia awareness and judgment for safe completion of ADLs secondary to pt's cognitive deficits, pt will complete abstract reasoning tasks (i.e. Word deduction, convergent and divergent naming, similarities/differences) with 80% accuracy and min cues. Goal 3: To address pt's cognitive deficits and promote her ability to safely follow directives in a variety of environments, pt will carry out verbal directives of increasing complexity in everyday activities with 80% accuracy and min cues. Goal 4: To promote awareness and functionality of compensatory strategies in light of pt's cognitive deficits, pt will recall 2 memory strategies with min cues and utilize them in  structured tasks in 80% of opportunities with min cues. Goal 5: To decrease cognitive deficits and improve attention to tasks for safe completion of ADLs, pt will complete structured tasks addressing alternating and divided attention with 80% accuracy and min cues. Long-term Goals  Timeframe for Long-term Goals: 1-2 weeks  Goal 1: Patient will demonstrate functional cognitive-linguistic abilities in all opportunities with modified independence in order to safely complete ADLs.   Patient's goals: \"get stronger, work on balance\"     Safety Devices  Safety Devices  Safety Devices in place: Yes  Type of devices: Bed alarm in place;Call light within reach  Restraints Initially in Place: No    Pain Assessment  Pain Assessment: Patient c/o pain  Pain Assessment: 0-10  Pain Level: 6  Pain Location: Shoulder    Pain Re-assessment  Pain Reassessment: Patient c/o pain    Speech Therapy Level of Assistance Scale:    AUDITORY COMPREHENSION  Rating: Modified Independent    VERBAL EXPRESSION  Rating: Modified Independent    MOTOR SPEECH  Rating: Independent    PROBLEM SOLVING  Rating: Supervised Assistance    MEMORY  Rating: Minimal Assistance      Therapy Time  SLP Individual Minutes  Time In: 0597  Time Out: 0940  Minutes: 30               Signature: Electronically signed by ANN MARIE Lyon on 7/10/2022 at 10:51 AM

## 2022-07-10 NOTE — PROGRESS NOTES
Subjective: The patient complains of  moderate to severe acute      partially relieved by rest, PT, OT, rest, pain medications and exacerbated by recent illness and exertion. I am concerned about patients medical complexities and current active problems and barriers to progress including hx of hep C and paranoid schitzophrenia. hCelo Vela He is hoping to trial Bengay and heating pad for her achy muscles. I discussed current functional, rehabilitation, medical status with other rehabilitation providers including nursing and case management. According to recent nursing note, \" . Pt in bed with 4/10 pain to right lower back and hip. Pt states that Tylenol helps the pain . Pt recently lost balance at home and fell fracturing right 10-11 th ribs. Pt has CKD stage 4. Pt has dialysis fistula graft to right upper arm that is not in use as of 7/8/22 due to being clotted off. No thrill or Bruit noted . No BP's or IV sticks to right arm. Pt does have tunneled Vascath that was put in by Dr Teofilo Jones, noted to left upper chest with 2 ports. Pt states that she has dialysis on Tue, [address], Sat. . Pt cooperative and pleasant. Alert and oriented x 4. Pt is on 1800 fluid restrictions. 02  2 liters prn here and at home. Pt tested negative for covid prior to coming to rehab. Pt came to ER  prior to this Rehab admission  with sepsis with enterococcus/infected dialysis cath, but cultures are clear now and are negative. Pt is to get vanco IV in dialysis which is Tuesday, Thursday and Saturday. Hx of CABG in January for stent and tricuspid valve replacement. One touch AC and HS. 249 at hs. Pt received lantus 35 units and Humalog 2 units at hs. with snack. Pt has bruise to back of right shoulder and right knee. Small sores noted to arms x 3 areas bilaterally from itching skin. Bandaid noted to areas. Pt's abdomen is discolored and bruised from insulin shots. Nystatin powder to abdominal folds. No edema noted to legs.  Otherwise skin to right upper arm that is not in use  Skin:   Intact   - discolored and bruised . ruise to back of right shoulder and right knee. Small sores noted to arms x 3     Rehabilitation:  Physical therapy: FIMS:  Bed Mobility:      Transfers:  ,  ,      FIMS:  ,  ,      Occupational therapy: FIMS:   ,  , Assessment: Pt is a 56 y/o female who presents to [facility] for medical decline with fall. Pt presents with above deficits and impaired ADL status.     Speech therapy: FIMS:        Lab/X-ray studies reviewed, analyzed and discussed with patient and staff:   Recent Results (from the past 24 hour(s))   POCT Glucose    Collection Time: 07/09/22 11:17 AM   Result Value Ref Range    POC Glucose 126 (H) 70 - 99 mg/dl    Performed on ACCU-CHEK    COVID-19, Rapid    Collection Time: 07/09/22  4:11 PM    Specimen: Nasopharyngeal Swab; Nasal swab   Result Value Ref Range    SARS-CoV-2, NAAT Not Detected Not Detected   Renal Function Panel    Collection Time: 07/09/22  7:53 PM   Result Value Ref Range    Sodium 132 (L) 135 - 144 mEq/L    Potassium 4.7 3.4 - 4.9 mEq/L    Chloride 91 (L) 95 - 107 mEq/L    CO2 26 20 - 31 mEq/L    Anion Gap 15 9 - 15 mEq/L    Glucose 224 (H) 70 - 99 mg/dL    BUN 25 (H) 8 - 23 mg/dL    CREATININE 3.43 (H) 0.50 - 0.90 mg/dL    GFR Non-African American 13.4 (L) >60    GFR  16.3 (L) >60    Calcium 9.8 8.5 - 9.9 mg/dL    Phosphorus 3.4 2.3 - 4.8 mg/dL    Albumin 3.9 3.5 - 4.6 g/dL   POCT Glucose    Collection Time: 07/09/22  8:04 PM   Result Value Ref Range    POC Glucose 249 (H) 70 - 99 mg/dl    Performed on ACCU-CHEK    CBC    Collection Time: 07/10/22  4:43 AM   Result Value Ref Range    WBC 9.4 4.8 - 10.8 K/uL    RBC 2.97 (L) 4.20 - 5.40 M/uL    Hemoglobin 9.7 (L) 12.0 - 16.0 g/dL    Hematocrit 26.9 (L) 37.0 - 47.0 %    MCV 90.6 82.0 - 100.0 fL    MCH 32.7 (H) 27.0 - 31.3 pg    MCHC 36.1 33.0 - 37.0 %    RDW 14.2 11.5 - 14.5 %    Platelets 639 193 - 996 K/uL   Urinalysis    Collection Time: 07/10/22  4:51 AM   Result Value Ref Range    Color, UA Yellow Straw/Yellow    Clarity, UA Clear Clear    Glucose, Ur 100 (A) Negative mg/dL    Bilirubin Urine Negative Negative    Ketones, Urine Negative Negative mg/dL    Specific Gravity, UA 1.009 1.005 - 1.030    Blood, Urine Negative Negative    pH, UA 8.0 5.0 - 9.0    Protein, UA 30 (A) Negative mg/dL    Urobilinogen, Urine 1.0 <2.0 E.U./dL    Nitrite, Urine Negative Negative    Leukocyte Esterase, Urine Negative Negative   Microscopic Urinalysis    Collection Time: 07/10/22  4:51 AM   Result Value Ref Range    Bacteria, UA Negative Negative /HPF    Hyaline Casts, UA 0-1 0 - 5 /HPF    WBC, UA 0-2 0 - 5 /HPF    RBC, UA 3-5 (A) 0 - 5 /HPF    Epithelial Cells, UA 0-2 0 - 5 /HPF   POCT Glucose    Collection Time: 07/10/22  6:30 AM   Result Value Ref Range    POC Glucose 158 (H) 70 - 99 mg/dl    Performed on ACCU-CHEK        Previous extensive, complex labs, notes and diagnostics reviewed and analyzed. ALLERGIES:    Allergies as of 07/09/2022 - Fully Reviewed 06/30/2022   Allergen Reaction Noted    Codeine Hives 12/09/2011    Oxycontin [oxycodone hcl] Hives 06/15/2020      (please also verify by checking MAR)      I have encouraged patient to attend their adjustment to rehabilitation support group with rec therapy and rehabilitation psychology in order to improve their adjustments, well-being, and help their spiritual and cognitive recovery. Complex Physical Medicine & Rehab Issues Assess & Plan:   1. Severe abnormality of gait and mobility and impaired self-care and ADL's secondary to progressive weakness dt OA flareup and  Polyneuropathy . Functional and medical status reassessed regarding patients ability to participate in therapies and patient found to be able to participate in acute intensive comprehensive inpatient rehabilitation program including PT/OT to improve balance, ambulation, ADLs, and to improve the P/AROM.   Therapeutic modifications regarding activities in therapies, place, amount of time per day and intensity of therapy made daily. In bed therapies or bedside therapies prn.   2. Bowel progressive constipation, and Bladder dysfunction monitoring neurogenic bladder,   Incontinence of urine:  frequent toileting, ambulate to bathroom with assistance, check post void residuals. Check for C.difficile x1 if >2 loose stools in 24 hours, continue bowel & bladder program.  Monitor bowel and bladder function. Lactinex 2 PO every AC. MOM prn, Brown Bomb prn, Glycerin suppository prn, enema prn.  3. Severe chest wall pain as well as generalized OA pain,   Chronic pain of both shoulders: reassess pain every shift and prior to and after each therapy session, give prn Tylenol and Ultram, modalities prn in therapy, Lidoderm, K-pad prn.   4. Skin healing and breakdown risk:  continue pressure relief program.  Daily skin exams and reports from nursing. Add co-Q10  5. Severe fatigue due to nutritional and hydration deficiency: Add vitamin B12 vitamin D and CoQ10 continue to monitor I&Os, calorie counts prn, dietary consult prn. Add healthy HS snack. 6. Acute episodic insomnia with situational adjustment disorder:  consider prn low dose Ambien, monitor for day time sedation. Add HS \"Tuck In\"  7. Falls risk elevated:  patient to use call light to get nursing assistance to get up, bed and chair alarm. 8. Elevated DVT risk: progressive activities in PT, continue prophylaxis MAHAMED hose, elevation and avoid Lovenox due to renal failure. 9. Complex discharge planning:  Weekly team meeting  every Monday to re-assess progress towards goals, discuss and address social, psychological and medical comorbidities and to address difficulties they may be having progressing in therapy. Patient and family education is in progress. The patient is to follow-up with their family physician after discharge.         Complex Active General Medical Issues that complicate care snack to prevent a.m. hypoglycemia, adjust/add medications (Lantus, sliding scale insulin)   9. Pulmonary hypertension -Acute rehab for endurance traing with Pulse Ox to monitoring oxygen saturation and heart rate with O2 titration to lowest effective dose. Pulse oximeter checks to shift and at HS to dose and titrate oxygen and aerosol treatments monitor for nocturnal hypoxemia, monitor vital signs, oxygen prn. Focus on energy conservation. 10.   Paranoid schizophrenia -emotional support provided daily, vitamin B12, encourage participation in rehabilitation support group and recreational therapy, adjust/add medications -patient had been on Zoloft at home.          Focus of today's plan-  Initiate and modify therapuetic plan to meet patients individual needs, add rest breaks as needed  -consider resuming SSRI      Willie Malave D.O., PM&R     Attending    [address]

## 2022-07-11 DIAGNOSIS — F33.1 MODERATE EPISODE OF RECURRENT MAJOR DEPRESSIVE DISORDER (HCC): ICD-10-CM

## 2022-07-11 LAB
GLUCOSE BLD-MCNC: 190 MG/DL (ref 70–99)
GLUCOSE BLD-MCNC: 206 MG/DL (ref 70–99)
GLUCOSE BLD-MCNC: 222 MG/DL (ref 70–99)
GLUCOSE BLD-MCNC: 235 MG/DL (ref 70–99)
PERFORMED ON: ABNORMAL
URINE CULTURE, ROUTINE: NORMAL
VANCOMYCIN RANDOM: 14.7 UG/ML (ref 10–40)

## 2022-07-11 PROCEDURE — 99221 1ST HOSP IP/OBS SF/LOW 40: CPT | Performed by: NURSE PRACTITIONER

## 2022-07-11 PROCEDURE — 97530 THERAPEUTIC ACTIVITIES: CPT

## 2022-07-11 PROCEDURE — 2700000000 HC OXYGEN THERAPY PER DAY

## 2022-07-11 PROCEDURE — 97116 GAIT TRAINING THERAPY: CPT

## 2022-07-11 PROCEDURE — 97535 SELF CARE MNGMENT TRAINING: CPT

## 2022-07-11 PROCEDURE — 99233 SBSQ HOSP IP/OBS HIGH 50: CPT | Performed by: PHYSICAL MEDICINE & REHABILITATION

## 2022-07-11 PROCEDURE — 99232 SBSQ HOSP IP/OBS MODERATE 35: CPT | Performed by: INTERNAL MEDICINE

## 2022-07-11 PROCEDURE — 1180000000 HC REHAB R&B

## 2022-07-11 PROCEDURE — 92526 ORAL FUNCTION THERAPY: CPT

## 2022-07-11 PROCEDURE — 6370000000 HC RX 637 (ALT 250 FOR IP): Performed by: PHYSICAL MEDICINE & REHABILITATION

## 2022-07-11 PROCEDURE — 80202 ASSAY OF VANCOMYCIN: CPT

## 2022-07-11 PROCEDURE — 97110 THERAPEUTIC EXERCISES: CPT

## 2022-07-11 PROCEDURE — 97129 THER IVNTJ 1ST 15 MIN: CPT

## 2022-07-11 PROCEDURE — 36415 COLL VENOUS BLD VENIPUNCTURE: CPT

## 2022-07-11 PROCEDURE — 6360000002 HC RX W HCPCS: Performed by: NURSE PRACTITIONER

## 2022-07-11 PROCEDURE — 6370000000 HC RX 637 (ALT 250 FOR IP): Performed by: INTERNAL MEDICINE

## 2022-07-11 PROCEDURE — 97112 NEUROMUSCULAR REEDUCATION: CPT

## 2022-07-11 RX ORDER — ARIPIPRAZOLE 5 MG/1
TABLET ORAL
Qty: 30 TABLET | Refills: 10 | OUTPATIENT
Start: 2022-07-11

## 2022-07-11 RX ORDER — FUROSEMIDE 20 MG/1
TABLET ORAL
Qty: 60 TABLET | Refills: 10 | OUTPATIENT
Start: 2022-07-11

## 2022-07-11 RX ORDER — TRAMADOL HYDROCHLORIDE 50 MG/1
25 TABLET ORAL EVERY 8 HOURS SCHEDULED
Status: DISCONTINUED | OUTPATIENT
Start: 2022-07-11 | End: 2022-07-20 | Stop reason: HOSPADM

## 2022-07-11 RX ORDER — ACETAMINOPHEN 500 MG
500 TABLET ORAL EVERY 8 HOURS SCHEDULED
Status: DISCONTINUED | OUTPATIENT
Start: 2022-07-11 | End: 2022-07-20 | Stop reason: HOSPADM

## 2022-07-11 RX ADMIN — INSULIN LISPRO 2 UNITS: 100 INJECTION, SOLUTION INTRAVENOUS; SUBCUTANEOUS at 22:57

## 2022-07-11 RX ADMIN — MICONAZOLE NITRATE: 2 POWDER TOPICAL at 08:42

## 2022-07-11 RX ADMIN — ISOSORBIDE MONONITRATE 120 MG: 60 TABLET, EXTENDED RELEASE ORAL at 08:43

## 2022-07-11 RX ADMIN — TRAMADOL HYDROCHLORIDE 25 MG: 50 TABLET, COATED ORAL at 22:03

## 2022-07-11 RX ADMIN — ARIPIPRAZOLE 5 MG: 5 TABLET ORAL at 08:43

## 2022-07-11 RX ADMIN — SERTRALINE 50 MG: 25 TABLET, FILM COATED ORAL at 22:59

## 2022-07-11 RX ADMIN — ATORVASTATIN CALCIUM 40 MG: 40 TABLET, FILM COATED ORAL at 22:44

## 2022-07-11 RX ADMIN — Medication 400 MG: at 08:43

## 2022-07-11 RX ADMIN — MICONAZOLE NITRATE: 2 POWDER TOPICAL at 23:03

## 2022-07-11 RX ADMIN — INSULIN GLARGINE 35 UNITS: 100 INJECTION, SOLUTION SUBCUTANEOUS at 22:05

## 2022-07-11 RX ADMIN — INSULIN LISPRO 4 UNITS: 100 INJECTION, SOLUTION INTRAVENOUS; SUBCUTANEOUS at 17:25

## 2022-07-11 RX ADMIN — Medication 100 MG: at 08:43

## 2022-07-11 RX ADMIN — ACETAMINOPHEN 500 MG: 500 TABLET ORAL at 14:01

## 2022-07-11 RX ADMIN — ACETAMINOPHEN 500 MG: 500 TABLET ORAL at 22:02

## 2022-07-11 RX ADMIN — Medication 10 MG: at 23:03

## 2022-07-11 RX ADMIN — TRAMADOL HYDROCHLORIDE 25 MG: 50 TABLET, COATED ORAL at 13:59

## 2022-07-11 RX ADMIN — INSULIN LISPRO 2 UNITS: 100 INJECTION, SOLUTION INTRAVENOUS; SUBCUTANEOUS at 22:44

## 2022-07-11 RX ADMIN — TRIMETHOBENZAMIDE HYDROCHLORIDE 200 MG: 100 INJECTION INTRAMUSCULAR at 07:02

## 2022-07-11 RX ADMIN — Medication 2000 UNITS: at 17:25

## 2022-07-11 RX ADMIN — ASPIRIN 81 MG: 81 TABLET, COATED ORAL at 08:43

## 2022-07-11 RX ADMIN — MIDODRINE HYDROCHLORIDE 5 MG: 5 TABLET ORAL at 08:43

## 2022-07-11 RX ADMIN — INSULIN LISPRO 4 UNITS: 100 INJECTION, SOLUTION INTRAVENOUS; SUBCUTANEOUS at 08:43

## 2022-07-11 RX ADMIN — TRAMADOL HYDROCHLORIDE 25 MG: 50 TABLET ORAL at 08:44

## 2022-07-11 RX ADMIN — INSULIN LISPRO 4 UNITS: 100 INJECTION, SOLUTION INTRAVENOUS; SUBCUTANEOUS at 12:09

## 2022-07-11 ASSESSMENT — PAIN SCALES - GENERAL
PAINLEVEL_OUTOF10: 6
PAINLEVEL_OUTOF10: 6
PAINLEVEL_OUTOF10: 7

## 2022-07-11 ASSESSMENT — ENCOUNTER SYMPTOMS
SHORTNESS OF BREATH: 0
RESPIRATORY NEGATIVE: 1
ABDOMINAL PAIN: 0
COUGH: 0
ABDOMINAL DISTENTION: 0
COLOR CHANGE: 0
NAUSEA: 0
TROUBLE SWALLOWING: 0
CHEST TIGHTNESS: 0
GASTROINTESTINAL NEGATIVE: 1
WHEEZING: 0
BACK PAIN: 1
VOMITING: 0

## 2022-07-11 ASSESSMENT — PAIN DESCRIPTION - LOCATION
LOCATION: KNEE;BACK
LOCATION: KNEE;BACK

## 2022-07-11 ASSESSMENT — PAIN DESCRIPTION - DESCRIPTORS: DESCRIPTORS: SHARP

## 2022-07-11 ASSESSMENT — PAIN DESCRIPTION - PAIN TYPE: TYPE: CHRONIC PAIN

## 2022-07-11 NOTE — PLAN OF CARE
Problem: Discharge Planning  Goal: Discharge to home or other facility with appropriate resources  7/10/2022 2205 by Tanya Vasquez. Misty Tellez RN  Outcome: Progressing  7/10/2022 2200 by Tanya Vasquez. Misty Tellez RN  Outcome: Progressing  7/10/2022 1138 by Karen Davis RN  Outcome: Progressing  Flowsheets (Taken 7/10/2022 0507 by Bobby Gallegos RN)  Discharge to home or other facility with appropriate resources:   Identify barriers to discharge with patient and caregiver   Identify discharge learning needs (meds, wound care, etc)   Refer to discharge planning if patient needs post-hospital services based on physician order or complex needs related to functional status, cognitive ability or social support system     Problem: Safety - Adult  Goal: Free from fall injury  7/10/2022 2205 by Tanya Vasquez. Misty Tellez RN  Outcome: Progressing  7/10/2022 2200 by Tanya Vasquez. Misty Tellez RN  Outcome: Progressing  7/10/2022 1138 by Karen Davis RN  Outcome: Progressing     Problem: Skin/Tissue Integrity  Goal: Absence of new skin breakdown  Description: 1. Monitor for areas of redness and/or skin breakdown  2. Assess vascular access sites hourly  3. Every 4-6 hours minimum:  Change oxygen saturation probe site  4. Every 4-6 hours:  If on nasal continuous positive airway pressure, respiratory therapy assess nares and determine need for appliance change or resting period. 7/10/2022 2205 by Bobby Tellez RN  Outcome: Progressing  7/10/2022 2200 by Tanya Vasquez. Misty Tellez RN  Outcome: Progressing  7/10/2022 1138 by Karen Davis RN  Outcome: Progressing     Problem: SLP Adult - Impaired Swallowing  Goal: By Discharge: Advance to least restrictive diet without signs or symptoms of aspiration for planned discharge setting. See evaluation for individualized goals.   7/10/2022 1015 by ANN MARIE Hardwick  Outcome: Progressing     Problem: SLP Adult - Impaired Communication  Goal: By Discharge: Demonstrates communication

## 2022-07-11 NOTE — PROGRESS NOTES
INDIVIDUALIZED OVERALL REHAB PLAN OF CARE  ADDENDUM TO REHAB PROGRESS NOTE-for audit purposes must also refer to this day's clinical note and combine the information      Date: 2022  Patient Name: Mirta Santiago   Room: S932/L425-91    MRN: [id number]    : 1960  (57 y.o.)  Gender: female       Today 2022 during weekly team meeting, I reviewed the patient Mirta Santiago in detail with the therapists and nurses involved in patient's care gathering complex physiatric data regarding current medical issues, progress in therapies, factors limiting progress, social issues, psychological issues, ongoing therapeutic plans and discharge planning. Legend:  I= independent Im =Modified independent  S=Supervised SB=stand by PALACIO=set up CG=contact annabelle Min= minimal Mod=Moderate Max=maximal Max of 2 =maximal assist of 2 people      CURRENT FUNCTIONAL STATUS:    Barriers to progress and discharge complex medical conditions, severe pain, complex social situations and bowel/bladder dysfunction      NURSING ISSUES:    Patient is getting nauseated when she is due for her dialysis due to toxic meds tabulate built up. She is responding to dialysis and Tigan. We also discussed her renal osteodystrophy pain and scheduling her Ultram every 8 hours she is in agreement. Pt took hs meds without difficulty. purwic on. Tylenol 650 mg po given at hs  For 6/10 pain to right lower back and hip. Bengay also applied to right lower back and hip. Pt has been sleeping quietly tonight, respirations deep and even. Pt does not feel that she needs any oxygen at hs or during day. 02 sat 97% on room air. Nursing will continue to focus on bowel and bladder continence transitioning toward independence by time of discharge. Monitoring post void residuals monitoring for severe constipation and bowel obstruction.     Bowel function- continent/normal  Plans to address- teach spinal cord style bowel program     Bladder function- incontinent    Plans to address- schedule voids before bed and therapy     Skin deficits-  icthyosis   Plans to address- topicals and dressing changes     Hydration/Nutritional deficits- monitoring for dysphagia  Plans to address-Push PO, assist with feeds as needed     Low BP- continue to monitor Ortho BPs  Plans to address- strict I's and O's    Pt and Family training goals-  teach home technology options like Agatha use and home assistive devices      Focus on achieving ADL goals with co-treating with OT when possible. Focus on cognition and co treat with SLP when possible. PHYSICAL THERAPY  Bed mobility:  Bed mobility  Rolling to Left: Modified independent (07/10/22 1146)  Rolling to Right: Modified independent (07/10/22 1146)  Supine to Sit: Stand by assistance (07/10/22 1139)  Sit to Supine: Stand by assistance (07/10/22 1139)  Bed Mobility Comments: HOB flat; no rail used; increased time and effort (07/10/22 1139)  Transfers:  Transfers  Sit to Stand: Stand by assistance (07/10/22 1143)  Stand to sit: Stand by assistance (07/10/22 1143)  Bed to Chair: Stand by assistance (07/10/22 1143)  Comment: rollator used (07/10/22 1143)  Gait:   Ambulation  Surface: level tile (07/10/22 1144)  Device: Rollator (07/10/22 1144)  Assistance: Stand by assistance (07/10/22 1144)  Quality of Gait: wide JAKE (07/10/22 1144)  Gait Deviations: Slow Tere;Decreased step length;Decreased step height (07/10/22 1144)  Distance: 60ft- pt exhibiting fatigue with this distance (07/10/22 1144)  Stairs:  Stairs/Curb  Stairs?: No (07/10/22 1144)  W/C mobility:           Pt and Family training goals-  Focus on energy conservation and consistency of function        OCCUPATIONAL THERAPY         Plans for addressing ADL deficits affecting: Focus on sequencing.         Bladder function disrupting functional progress- incontinent      Plans to address- if catheter/PureWik is/was a permanent assistive device coordinate patient and family education with nursing     Skin deficits- skin tears   Plans to address- continue to monitor                SPEECH THERAPY/SLP     Comprehension: Exceptions  Verbal Expression: Exceptions to functional limits    Plans for cognitive and communication issues affecting addressing:   Pt and Family training goals-  teach home tecnology options like Agatha use and home assistive devices       Diet/Swallow:        Dysphagia Outcome Severity Scale: Level 5: Mild dysphagia- Distant supervision.  May need one diet consistency restricted    Compensatory Swallowing Strategies : Upright as possible for all oral intake,Small bites/sips,Swallow 2 times per bite/sip  Therapeutic Interventions: Bolus control exercises,Pharyngeal exercises,Diet tolerance monitoring,Patient/Family education          COGNITION  OT: Cognition Comment: comp: SUP, exp: SUP, soc int: MIN A, prob solving: MIN A, mem: SUP  SP:        Social History     Socioeconomic History    Marital status:      Spouse name: Not on file    Number of children: 2    Years of education: Not on file    Highest education level: Not on file   Occupational History    Occupation: disabled   Tobacco Use    Smoking status: Never Smoker    Smokeless tobacco: Never Used   Vaping Use    Vaping Use: Never used   Substance and Sexual Activity    Alcohol use: No     Alcohol/week: 0.0 standard drinks    Drug use: No    Sexual activity: Not Currently   Other Topics Concern    Not on file   Social History Narrative    Disabled[place] [facility] [place]    [facility] [place] [facility] is close by     [facility] in [place], one of 5    Twin sister Reyes, very ill in 2018, [place] 2019    Moved to [facility], , 2 children, one son and one daughter    Worked at [employer], as a nurse's aide    Disabled due to mental illness    Lived at [facility], was discharged, returned to independent living in 2017 in the daughter's house and has adjusted well    One son and one daughter, live in the same house with patient, Dori pays the rent    [facility] [employer] reading ([employer])             10/11/2021 [facility] updates; patient lives with her daughter son-in-law and 2 grandchildren and patient's handicapped son. Per daughter, her brother is blind, MRDD, multiple health issues. Daughter's  is patient's legal guardian. Patient has hemodialysis Tuesday Thursday and Saturday. Patient's bedroom is on main floor with a half bath. Daughter walks patient upstairs once weekly for full bath. Patient is using her walker in the home. Patient has a hospital bed in the home. Social Determinants of Health     Financial Resource Strain:     Difficulty of Paying Living Expenses: Not on file   Food Insecurity:     Worried About Running Out of Food in the Last Year: Not on file    Yung of Food in the Last Year: Not on file   Transportation Needs:     Lack of Transportation (Medical): Not on file    Lack of Transportation (Non-Medical):  Not on file   Physical Activity: Sufficiently Active    Days of Exercise per Week: 3 days    Minutes of Exercise per Session: 60 min   Stress:     Feeling of Stress : Not on file   Social Connections:     Frequency of Communication with Friends and Family: Not on file    Frequency of Social Gatherings with Friends and Family: Not on file    Attends Protestant Services: Not on file    Active Member of Clubs or Organizations: Not on file    Attends Club or Organization Meetings: Not on file    Marital Status: Not on file   Intimate Partner Violence:     Fear of Current or Ex-Partner: Not on file    Emotionally Abused: Not on file    Physically Abused: Not on file    Sexually Abused: Not on file   Housing Stability:     Unable to Pay for Housing in the Last Year: Not on file    Number of [place] in the Last Year: Not on file    Unstable Housing in the Last Year: Not on file           THERAPY, MEDICAL AND NURSING COORDINATION:    [x]  Pain medication before therapies     [x]  Check orthostatic BP and monitor heart rate and medications effects with therapy      [x]  Ambulate to the bathroom in room    [x]  Add scheduled rest beaks     [x]  In room therapies as needed      Discharge date set for:              7/16/22      Home with:   Dtr, grand children and pt's disabled son  with help from   dtr who is her pain caregiver            And: Home Health Care:     [x]  PT    [x]  OT    []  ST   [x]  Aide   []  SW    [x]  RN                   Equipment:  [employer] Pure wik      At D/C their function is goaled at:   PT:Long term goal 1: Indep bed mobility  Long term goal 2: Indep transfers bed to chair with safest assistive device  Long term goal 3: Ambulate with rollator Indep on level and ramp surfaces >/= 50' to allow safe return home. Long term goal 4: Pt will demonstrate improved score on Ugalde balance assessment 48/56 for decreased risk for falls.   OT:Eating  Reason if not Attempted: Not attempted due to environmental limitations  CARE Score: 10  Discharge Goal: Independent, Oral Hygiene  Assistance Needed: Setup or clean-up assistance  CARE Score: 5  Discharge Goal: Independent, [address] needed: Substantial/maximal assistance  Comment: Burnice Moreno in place and brief on pt, needed assistance to clean following previous BM  CARE Score: 2  Discharge Goal: Supervision or touching assistance, Shower/Bathe Self  Assistance Needed: Partial/moderate assistance  CARE Score: 3  Discharge Goal: Independent  Upper Body Dressing  Assistance Needed: Setup or clean-up assistance  CARE Score: 5  Discharge Goal: Independent, Lower Body Dressing  Assistance Needed: Partial/moderate assistance  CARE Score: 3  Discharge Goal: Independent, Putting On/Taking Off Footwear  Assistance Needed: Substantial/maximal assistance  CARE Score: 2  Discharge Goal: Independent, Toilet Transfer  Reason if not Attempted: Patient refused  CARE Score: 7  Discharge Goal: Independent  SP:Long-term Goals  Timeframe for Long-term Goals: 1-2 weeks  Goal 1: Patient will demonstrate functional cognitive-linguistic abilities in all opportunities with modified independence in order to safely complete ADLs. Long-term Goals  Timeframe for Long-term Goals: 1-2 weeks  Goal 1: Patient will tolerate the least restrictive diet without adverse outcomes. From a cognitive standpoint they will need:        24 hr supervision  --progress to occasional           Significant problems/ barriers to functional progress include: Pt is at a high risk for functional loss,      []  Acute infection/UTI    []  Low BP's     []  COPD flare-up   []  Uncontrolled blood sugar     []  Progressive anemia     [x]  poor endurance    [x]  Severe pain exacerbation     [x]  Impaired mental status    [x]  Urinary incontinence    []  Bowel incontinence           Plan to correct barriers to functional progress: Add scheduled rest breaks, CoQ 10 and Vit B 12, control pain by using ice Lidoderm rest and massage as well as pain medications prior to therapy. Spread therapy of 15 hours out over a 7 day window to accommodate rest breaks and medical interventions. Patient seems to be making fair to good response to these interventions. Based on a comprehensive evaluation of the above, the individualized therapy and Discharge plan will be:    -Times stated are an average that will be varied based on the patient's daily need. PT    1 1/2  hrs/day 5-7 days per week      OT    1 1/2 hrs per day 5-7 days per week     ST     1/2    hrs /day 3-5 days per week       Estimated LOS 1- 2 week(s)    - Overall functional prognosis:     [x]  Good    []  Fair    []  Poor -Medical Prognosis:   [x]  Good    []  Fair    []  Poor    This patient was made aware of the discussion of Plan of Care, their projected dicharge date and their projected function at discharge.          Marlen Hodges DO

## 2022-07-11 NOTE — PROGRESS NOTES
Infectious Disease     Patient Name: Eulalio Oconnell  Date: 7/11/2022  YOB: 1960  Medical Record Number: [id number]        Sepsis with Enterococcus  Infected dialysis cath     mitral valve regurgitation  coronary artery bypass graft x1 vessel with tricuspid valve repair on 1/21/2022      Blood cultures from admission 2 sets growing gram-positive cocci in chains  Identified as Enterococcus faecalis by PCR  Patient currently on vancomycin    Patient has dialysis catheter in left chest which has been used while her dialysis fistula in right upper extremity has been repaired she states they are now using the fistula in right arm          Review of Systems   Constitutional: Negative. Negative for chills, diaphoresis, fatigue and fever. Respiratory: Negative. Cardiovascular: Negative. Gastrointestinal: Negative. Physical Exam  Constitutional:       Appearance: She is ill-appearing. Cardiovascular:      Heart sounds: Murmur heard. Pulmonary:      Effort: Pulmonary effort is normal. No respiratory distress. Breath sounds: Normal breath sounds. No wheezing, rhonchi or rales. Abdominal:      General: Abdomen is flat. Bowel sounds are normal. There is no distension. Palpations: Abdomen is soft. There is no mass. Tenderness: There is no abdominal tenderness. There is no guarding. Blood pressure 132/63, pulse 89, temperature 97.7 °F (36.5 °C), temperature source Oral, resp. rate 17, height 5' 7\" (1.702 m), weight 195 lb (88.5 kg), SpO2 92 %, not currently breastfeeding.       .   Lab Results   Component Value Date    WBC 9.4 07/10/2022    HGB 9.7 (L) 07/10/2022    HCT 26.9 (L) 07/10/2022    MCV 90.6 07/10/2022     07/10/2022     Lab Results   Component Value Date/Time     07/09/2022 07:53 PM    K 4.7 07/09/2022 07:53 PM    K 4.3 07/04/2022 03:07 AM    CL 91 07/09/2022 07:53 PM    CO2 26 07/09/2022 07:53 PM    BUN 25 07/09/2022 07:53 PM    CREATININE 3.43 07/09/2022 07:53 PM    GLUCOSE 224 07/09/2022 07:53 PM    GLUCOSE 419 07/30/2021 08:16 AM    CALCIUM 9.8 07/09/2022 07:53 PM         7/1/22 1613   Culture Catheter Tip Culture, Catheter Tip:  NO GROWTH   Performed at [address] [place] [address]   ([phone]        6/30/22 4365   Culture, Blood Id Sensitivity  Abnormal   Cult,Blood:  POSITIVE Blood Culture   Performed at [address] [place] [address]   ([phone]   P      Organism Enterococcus faecalis Abnormal  P    Resulting Agency [address] [facility]          Susceptibility      Enterococcus faecalis (2)    Antibiotic Interpretation Microscan  Method Status    ampicillin Sensitive <=2 mcg/mL BACTERIAL SUSCEPTIBILITY PANEL BY DIYA     vancomycin Sensitive 1 mcg/mL BACTERIAL SUSCEPTIBILITY PANEL BY DIYA                   7/1/22 1613   Culture Catheter Tip Culture, Catheter Tip:  NO GROWTH          ASSESSMENT:  PLAN:    Sepsis with Enterococcus  Infected dialysis cath  Continue vancomycin 2 weeks more

## 2022-07-11 NOTE — PROGRESS NOTES
Subjective: The patient complains of  moderate to severe acute      partially relieved by rest, PT, OT, rest, pain medications and exacerbated by recent illness and exertion. I am concerned about patients medical complexities and current active problems and barriers to progress including hx of hep C and paranoid schitzophrenia. Noemí Orourke He is hoping to trial Bengay and heating pad for her achy muscles. Patient is getting nauseated when she is due for her dialysis due to toxic meds tabulate built up. She is responding to dialysis and Tigan. We also discussed her renal osteodystrophy pain and scheduling her Ultram every 8 hours she is in agreement. I discussed current functional, rehabilitation, medical status with other rehabilitation providers including nursing and case management. According to recent nursing note, \" Pt took hs meds without difficulty. purwic on. Tylenol 650 mg po given at hs  For 6/10 pain to right lower back and hip. Bengay also applied to right lower back and hip. Pt has been sleeping quietly tonight, respirations deep and even. Pt does not feel that she needs any oxygen at hs or during day. 02 sat 97% on room air\". ROS x10: The patient also complains of severely impaired mobility and activities of daily living. Otherwise no new problems with vision, hearing, nose, mouth, throat, dermal, cardiovascular, GI, , pulmonary, musculoskeletal, psychiatric or neurological. See Rehab H&P on Rehab chart dated . Vital signs:  /63   Pulse 89   Temp 97.7 °F (36.5 °C) (Oral)   Resp 17   Ht 5' 7\" (1.702 m)   Wt 195 lb (88.5 kg)   LMP  (LMP Unknown)   SpO2 92%   BMI 30.54 kg/m²   I/O:   PO/Intake:  fair PO intake, no problems observed or reported. Bowel/Bladder:  incontinent,Purwic constipation and urinary urgency. Last BM 7/9/22. General:  Patient is well developed, adequately nourished, non-obese and     well kempt.      HEENT:    PERRLA, hearing intact to loud voice, external inspection of ear     and nose benign. Inspection of lips, tongue and gums  No teeth  Musculoskeletal: No significant change in strength or tone. All joints stable. Inspection and palpation of digits and nails show no clubbing,       cyanosis or inflammatory conditions. Neuro/Psychiatric: Affect: flat. Alert and oriented to person, place and     situation. No significant change in deep tendon reflexes or     Sensation    Lungs:  Diminished, CTA-B. Respiration effort is normal at rest.   fractured right 10-11 th ribs. tunneled Vascath left upper    chest with 2 ports. Heart:   S1 = S2, RRR. No loud murmurs. Abdomen:  Soft, non-tender, no enlargement of liver or spleen. Extremities:  Trace lower extremity edema,    tenderness. dialysis fistula graft to right upper arm that is not in use  Skin:   Intact   - discolored and bruised . ruise to back of right shoulder and right knee. Small sores noted to arms x 3     Rehabilitation:  Physical therapy: FIMS:  Bed Mobility: Scooting: Stand by assistance    Transfers: Sit to Stand: Stand by assistance  Stand to sit: Stand by assistance  Bed to Chair: Stand by assistance, Ambulation  Surface: level tile  Device: Rollator  Assistance: Stand by assistance  Quality of Gait: wide JAKE  Gait Deviations: Slow Tere,Decreased step length,Decreased step height  Distance: 60ft- pt exhibiting fatigue with this distance,      FIMS:  ,  , Assessment: Pt is 57 y.o. female to hospital due to septicemia. Pt exhibits decreased strength, balance, posture and increased pain which increases pt need for assistance with mobility to decreased risk for falls at home. Continued PT is required for safe d/c home with family. Occupational therapy: FIMS:   ,  , Assessment: Pt is a 56 y/o female who presents to [facility] for medical decline with fall. Pt lives with dtr who assists with ADLs and IADLs PTA. Pt presents with above deficits and impaired ADL status.  Pt may benefit from skilled OT intervention for increased indepdnence and safety upon d/c to home    Speech therapy: FIMS:        Lab/X-ray studies reviewed, analyzed and discussed with patient and staff:   Recent Results (from the past 24 hour(s))   POCT Glucose    Collection Time: 07/10/22 11:20 AM   Result Value Ref Range    POC Glucose 255 (H) 70 - 99 mg/dl    Performed on ACCU-CHEK    POCT Glucose    Collection Time: 07/10/22  4:19 PM   Result Value Ref Range    POC Glucose 204 (H) 70 - 99 mg/dl    Performed on ACCU-CHEK    POCT Glucose    Collection Time: 07/10/22  9:03 PM   Result Value Ref Range    POC Glucose 220 (H) 70 - 99 mg/dl    Performed on ACCU-CHEK    Vancomycin Level, Random    Collection Time: 07/11/22  4:23 AM   Result Value Ref Range    Vancomycin Rm 14.7 10.0 - 40.0 ug/mL   POCT Glucose    Collection Time: 07/11/22  6:07 AM   Result Value Ref Range    POC Glucose 222 (H) 70 - 99 mg/dl    Performed on ACCU-CHEK        Previous extensive, complex labs, notes and diagnostics reviewed and analyzed. ALLERGIES:    Allergies as of 07/09/2022 - Fully Reviewed 06/30/2022   Allergen Reaction Noted    Codeine Hives 12/09/2011    Oxycontin [oxycodone hcl] Hives 06/15/2020      (please also verify by checking MAR)        Today I evaluated this patient for periodic reassessment of medical and functional status. The patient was discussed in detail at the treatment team meeting focusing on current medical issues, progress in therapies, social issues, psychological issues, barriers to progress and strategies to address these barriers, and discharge planning. See the addendum to rehab progress note-as a second progress note in the chart. The patient continues to be high risk for future disability and their medical and rehabilitation prognosis continue to be good and therefore, we will continue the patient's rehabilitation course as planned. The patient's tentative discharge date was set.  Patient and family education was discussed. The patient was made aware of the team discussion regarding their progress. Complex Physical Medicine & Rehab Issues Assess & Plan:   1. Severe abnormality of gait and mobility and impaired self-care and ADL's secondary to progressive weakness dt OA flareup and  Polyneuropathy . Functional and medical status reassessed regarding patients ability to participate in therapies and patient found to be able to participate in acute intensive comprehensive inpatient rehabilitation program including PT/OT to improve balance, ambulation, ADLs, and to improve the P/AROM. Therapeutic modifications regarding activities in therapies, place, amount of time per day and intensity of therapy made daily. In bed therapies or bedside therapies prn.   2. Bowel progressive constipation, and Bladder dysfunction monitoring neurogenic bladder,   Incontinence of urine:  frequent toileting, ambulate to bathroom with assistance, check post void residuals. Check for C.difficile x1 if >2 loose stools in 24 hours, continue bowel & bladder program.  Monitor bowel and bladder function. Lactinex 2 PO every AC. MOM prn, Brown Bomb prn, Glycerin suppository prn, enema prn.  3. Severe chest wall pain as well as generalized OA pain,   Chronic pain of both shoulders: reassess pain every shift and prior to and after each therapy session, give prn Tylenol and Ultram, modalities prn in therapy, Lidoderm, K-pad prn.   4. Skin healing and breakdown risk:  continue pressure relief program.  Daily skin exams and reports from nursing. Add co-Q10  5. Severe fatigue due to nutritional and hydration deficiency: Add vitamin B12 vitamin D and CoQ10 continue to monitor I&Os, calorie counts prn, dietary consult prn. Add healthy HS snack. 6. Acute episodic insomnia with situational adjustment disorder:  consider prn low dose Ambien, monitor for day time sedation. Add HS \"Tuck In\"  7.  Falls risk elevated:  patient to use call light to get nursing assistance to get up, bed and chair alarm. 8. Elevated DVT risk: progressive activities in PT, continue prophylaxis MAHAMED hose, elevation and avoid Lovenox due to renal failure. 9. Complex discharge planning:  Weekly team meeting  every Monday to re-assess progress towards goals, discuss and address social, psychological and medical comorbidities and to address difficulties they may be having progressing in therapy. Patient and family education is in progress. The patient is to follow-up with their family physician after discharge. Complex Active General Medical Issues that complicate care Assess & Plan:     1. Principal Problem:    Impaired mobility and activities of daily living dt polyneuropathy and reccent fall   Active Problems:  1. CKD (chronic kidney disease) stage 4, GFR 15-29 ml/min,   ESRD (end stage renal disease) on dialysis, Patient is getting nauseated when she is due for her dialysis due to toxic meds tabulate built up. She is responding to dialysis and Tigan. We also discussed her renal osteodystrophy pain and scheduling her Ultram every 8 hours she is in agreement. 2.   Closed T11 fracture renal osteodystrophy,   Multiple closed fractures of ribs of right side,   Chest wall contusion, left -Lidoderm, vitamin D, abdominal binder as tolerated  3. Encephalopathy acute,   Cerebral microvascular disease-limit toxic medications  4. MRSA bacteremia with Septicemia -IV vancomycin consult pharmacy for dosing monitor renal function and adjust dose. 5.   Chronic hepatitis C-eliminate toxic medications  6. Vitamin B 12 deficiency, Vitamin D insufficiency-Add high-dose vitamin D, recheck vitamin D level out after discharge,  7.  Now with low blood pressure but history of essential (primary) hypertension,   Chronic diastolic congestive heart failure-Acute rehab to monitor heart rate and rhythm with the option of telemetry and the effects of chronotropic medication with respect to increasing physical activity and exercise in PT, OT, ADLs with medication titration to lowest effective dosing. Continue blood signs every shift focusing on heart rate, rhythm and blood pressure checks with orthostatic checks-monitoring the effect of exercise, therapy and posture. Consult hospitalist for backup medical and adjust/add medications (aspirin, Imdur, Lipitor, ProAmatine). Monitor heart rate and blood pressure as well as medications effects on vital signs before during and after therapy with especial focus on preventing orthostasis and falls risk. 8.   Controlled type 2 diabetes mellitus with diabetic neuropathy, with long-term current use of insulin-Continue blood sugar checks every shift, diet, add diabetic add dietary education, restrict carbohydrates to lowest effective and safe carb count per meal advising 4 carbs per meal, add at bedtime snack to prevent a.m. hypoglycemia, adjust/add medications (Lantus, sliding scale insulin)   9. Pulmonary hypertension -Acute rehab for endurance traing with Pulse Ox to monitoring oxygen saturation and heart rate with O2 titration to lowest effective dose. Pulse oximeter checks to shift and at HS to dose and titrate oxygen and aerosol treatments monitor for nocturnal hypoxemia, monitor vital signs, oxygen prn. Focus on energy conservation. 10.   Paranoid schizophrenia -emotional support provided daily, vitamin B12, encourage participation in rehabilitation support group and recreational therapy, adjust/add medications -patient had been on Zoloft at home. Focus of today's plan-  Initiate and modify therapuetic plan to meet patients individual needs, add rest breaks as needed  -consider resuming SSRI. Patient is getting nauseated when she is due for her dialysis due to toxic meds tabulate built up. She is responding to dialysis and Tigan. We also discussed her renal osteodystrophy pain and scheduling her Ultram every 8 hours she is in agreement. Jorge A Manuel D.O., PM&R     Attending    [address]

## 2022-07-11 NOTE — PLAN OF CARE
Problem: Discharge Planning  Goal: Discharge to home or other facility with appropriate resources  7/10/2022 2200 by Lola Blanco. Yasmeen Vogt RN  Outcome: Progressing  7/10/2022 1138 by Loki Enriquez RN  Outcome: Progressing  Flowsheets (Taken 7/10/2022 0507 by Isela Harper, FLAQUITO)  Discharge to home or other facility with appropriate resources:   Identify barriers to discharge with patient and caregiver   Identify discharge learning needs (meds, wound care, etc)   Refer to discharge planning if patient needs post-hospital services based on physician order or complex needs related to functional status, cognitive ability or social support system     Problem: Safety - Adult  Goal: Free from fall injury  7/10/2022 2200 by Neo Vogt RN  Outcome: Progressing  7/10/2022 1138 by Loki Enriquez RN  Outcome: Progressing     Problem: Skin/Tissue Integrity  Goal: Absence of new skin breakdown  Description: 1. Monitor for areas of redness and/or skin breakdown  2. Assess vascular access sites hourly  3. Every 4-6 hours minimum:  Change oxygen saturation probe site  4. Every 4-6 hours:  If on nasal continuous positive airway pressure, respiratory therapy assess nares and determine need for appliance change or resting period. 7/10/2022 2200 by Lola Vogt RN  Outcome: Progressing  7/10/2022 1138 by Loki Enriquez RN  Outcome: Progressing     Problem: SLP Adult - Impaired Swallowing  Goal: By Discharge: Advance to least restrictive diet without signs or symptoms of aspiration for planned discharge setting. See evaluation for individualized goals. 7/10/2022 1015 by ANN MARIE Colvin  Outcome: Progressing     Problem: SLP Adult - Impaired Communication  Goal: By Discharge: Demonstrates communication skills at highest level of function for planned discharge setting. See evaluation for individualized goals.   7/10/2022 1051 by ANN MARIE Colvin  Outcome: Progressing     Problem: ABCDS Injury Assessment  Goal: Absence of physical injury  7/10/2022 2200 by Hugo Cloud RN  Outcome: Progressing  7/10/2022 1138 by Simone Philip RN  Outcome: Progressing  Flowsheets  Taken 7/10/2022 0202 by Lianet Negron RN  Absence of Physical Injury: Implement safety measures based on patient assessment  Taken 7/10/2022 0052 by Hugo Negron RN  Absence of Physical Injury: Implement safety measures based on patient assessment     Problem: Nutrition Deficit:  Goal: Optimize nutritional status  7/10/2022 2200 by Hugo Cloud RN  Outcome: Progressing  7/10/2022 1329 by Yanna Carter, MS, RD, LD  Flowsheets (Taken 7/10/2022 1329)  Nutrient intake appropriate for improving, restoring, or maintaining nutritional needs:   Assess nutritional status and recommend course of action   Monitor oral intake, labs, and treatment plans   Recommend appropriate diets, oral nutritional supplements, and vitamin/mineral supplements   Order, calculate, and assess calorie counts as needed   Provide specific nutrition education to patient or family as appropriate   Recommend, monitor, and adjust tube feedings and TPN/PPN based on assessed needs

## 2022-07-11 NOTE — PROGRESS NOTES
[facility]  Facility/Department: [place] Other  Speech Language Pathology   Treatment Note          Richard Cox  1960  [id number]  [x]   confirmed    Date: 2022    Rehab Diagnosis:        Restrictions/Precautions: Fall Risk  Position Activity Restriction  Other position/activity restrictions: L Rib fractures - 10 and 11 per patient    Weight: 195 lb (88.5 kg)     ADULT ORAL NUTRITION SUPPLEMENT; Breakfast, Lunch, Dinner; Diabetic Oral Supplement  ADULT DIET; Dysphagia - Soft and Bite Sized; 3 carb choices (45 gm/meal); No Added Salt (3-4 gm); 1800 ml    SpO2: 92 % (22 0709)  No active isolations    Speech Dx: Dysphagia and Cognitive Linguistic Impairment    Subjective:  Alert, Cooperative and Pleasant        Interventions used this date:  Cognitive Skill Development, Dysphagia Treatment and Instruction in Compensatory Strategies    Objective/Assessment:  Patient progressing towards goals:  Short-term Goals  Timeframe for Short-term Goals: 1-2 weeks  Goal 1: To increase safety awareness and judgment for safe completion of ADLs secondary to pt's cognitive deficits,  pt will complete high level problem solving tasks related to ADLs with 80% accuracy and min cues. Goal 2: To increase safety awareness and judgment for safe completion of ADLs secondary to pt's cognitive deficits, pt will complete abstract reasoning tasks (i.e. Word deduction, convergent and divergent naming, similarities/differences) with 80% accuracy and min cues. Pt completed similarities/differences and had 90% acc with differences Independently and 100% acc with similarities Independently. Goal 3: To address pt's cognitive deficits and promote her ability to safely follow directives in a variety of environments, pt will carry out verbal directives of increasing complexity in everyday activities with 80% accuracy and min cues. Goal 4:  To promote awareness and functionality of compensatory strategies in light of pt's cognitive deficits, pt will recall 2 memory strategies with min cues and utilize them in  structured tasks in 80% of opportunities with min cues. Pt stated that she does note taking and uses a calendar at home that she looks at daily to help her memory. Her daughter checks with her during the day to see if she has taken her medications. Pt recalled visual pictures after 10 minute delay using grouping with 100% acc Independently  Goal 5: To decrease cognitive deficits and improve attention to tasks for safe completion of ADLs, pt will complete structured tasks addressing alternating and divided attention with 80% accuracy and min cues. Short-term Goals  Timeframe for Short-term Goals: 1-2 weeks  Goal 1: Pt will complete lingual exercises that promote anterior to posterior propulsion of bolus and improve tongue base retraction with 80% accuracy in order to strengthen the muscles of the swallow to decrease risk of aspiration and to increase ability to safely handle the least restrictive diet level. Goal 2: Pt will complete effortful swallow exercise with 80% accuracy, given cues as needed, to decrease bolus residue in the valleculae per all consistencies. Pt completed effortful swallow with 80% acc and extra time  Goal 3: Pt will tolerate 5/5 trials of easy to chew solids with adequate mastication and oral clearance of bolus in all opportunities. Goal 4: Pt will tolerate the recommended diet level without overt s/s of aspiration in all opportunities. Upon arrival pt asked SLP why she was on the soft and bite sized diet again when she had been switched to a regular diet for a few days. SLP explained results of recent BSE and goals. Pt verbalized understanding. Treatment/Activity Tolerance:  Patient tolerated treatment well    Plan:  Continue per POC    Pain Assessment:  Patient does not c/o pain. Pain Re-assessment:  Patient does not c/o pain.     Patient/Caregiver Education:  Patient educated on session and progression towards goals. Patient stated verbal understanding of directions.     Safety Devices:  Call light within reach and Chair alarm in place      Dysphagia Outcome Severity Scale    SWALLOWING  Ratin      Speech Therapy Level of Assistance Scale    AUDITORY COMPREHENSION  Rating:  Independent-Modified Independent    VERBAL EXPRESSION  Rating:  Modified Independent    MOTOR SPEECH  Rating: Independent    PROBLEM SOLVING  Rating: Supervised Assistance    MEMORY  Rating:  Supervised Assistance-Minimal Assistance          Therapy Time   Time in: 1000  Time out: 1030  Minutes: 30   dysphagia: 15  Cognition: 15           Signature: Electronically signed by ANN MARIE Nunez on 2022 at 10:28 AM

## 2022-07-11 NOTE — CARE COORDINATION
[address] [facility]  INPATIENT REHABILITATION  TEAM CONFERENCE NOTE  Room: R248/R248-01  Admit Date: 2022       Date: 2022  Patient Name: Florence Cary        MRN: [id number]    : 1960  (60 y.o.)  Gender: female        REHAB DIAGNOSIS:        CO MORBIDITIES:      Past Medical History:   Diagnosis Date    Angina at rest [facility]) 2020    Anxiety     CAD S/P percutaneous coronary angioplasty ,     stents per dr Gary Tesfaye CHF (congestive heart failure) ([address]     CKD (chronic kidney disease) stage 4, GFR 15-29 ml/min ([address] 2018    CKD stage 4 due to type 2 diabetes mellitus ([address]     Contusion of right chest wall 2021    COPD (chronic obstructive pulmonary disease) ([address]     Diabetic nephropathy with proteinuria ([address]     DJD (degenerative joint disease) of knee     Dr Yudith Campbell GERD (gastroesophageal reflux disease)     Hemiparesis, left ([address] 2013    entered Assisted Living ([facility])    Hemodialysis patient [facility])     Hemodialysis-associated hypotension 10/22/2021    History of heart failure     History of seizures     History of type C viral hepatitis     HTN (hypertension)     Hyperlipidemia     Impaired mobility and activities of daily living     Mediastinal lymphadenopathy     Rishi Dean    Metabolic syndrome     Moderate persistent asthma without complication     Need for extended care facility 2021    Neurogenic urinary incontinence 2013    Neuropathy in diabetes [facility])     Nonrheumatic mitral valve regurgitation 2021    Nonrheumatic tricuspid valve regurgitation 2021    Obesity (BMI 30-39. 9)     Recurrent UTI     S/P colonoscopy     [facility], focal active colitis    Schizophrenia, paranoid, chronic ([address]     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 Bryon Driscoll      x1 Dr. Arron Cruz, Dr Fay Jiménez  08/10/2021    [facility]    DIAGNOSTIC CARDIAC CATH LAB PROCEDURE  10/02/2019    DIALYSIS CATHETER INSERTION Left 2022    Tunneled Symetrex 15.5F x 23cm hemodialysis catheter inserted by Dr. Braxton Lewis, TOTAL ABDOMINAL (CERVIX REMOVED)      one ovary intact, Dr Jaiden Tristan, menorrhagia    IR THROMBECTOMY PERCUT AVF  2022    IR THROMBECTOMY PERCUT AVF 2022 [facility] SPECIAL PROCEDURE    IR TUNNELED CATHETER PLACEMENT GREATER THAN 5 YEARS  2022    IR TUNNELED CATHETER PLACEMENT GREATER THAN 5 YEARS 6/3/2022 [facility] SPECIAL PROCEDURE    IR TUNNELED CATHETER PLACEMENT GREATER THAN 5 YEARS Left 2022    15.5 fr by 23 cm Symetrex dialysis catheter inserted by Dr Gina López IR TUNNELED [address] 5 YEARS  2022    IR TUNNELED CATHETER PLACEMENT GREATER THAN 5 YEARS 2022 [facility] SPECIAL PROCEDURE    UT TOTAL KNEE ARTHROPLASTY Left 2018    LEFT KNEE TOTAL KNEE ARTHROPLASTY, SHAYNA, NERVE BLOCK performed by John Flores MD at [address] [place] [address] [facility]CA      TOE AMPUTATION Right     TOTAL KNEE ARTHROPLASTY  2016    Dr Sherwood Median TUNNELED [address] Right 2020    tunneled HD catheter per Dr Urszula Covarrubias        Restrictions  Restrictions/Precautions: Fall Risk (high phillips score)  Position Activity Restriction  Other position/activity restrictions: L Rib fractures - 10 and 11 per patient  CASE MANAGEMENT    Social/Functional History  Social/Functional History  Lives With: Daughter (pt states she is never home alone)  Type of Home: House  Home Layout: Two level,Able to Live on Main level with bedroom/bathroom  Home Access: Ramped entrance  Bathroom Shower/Tub:  (washes up at sink)  Bathroom Toilet: Standard  Home Equipment: Giacomo Hilt, rolling,Hospital bed  Has the patient had two or more falls in the past year or any fall with injury in the past year?: Yes  Receives Help From: Family (dtr is official HHA)  ADL Assistance: Needs assistance (assist with socks/brief/pans over feet, bra. Assist for hygiene after BM and with bathing)  Bath: Moderate assistance  Dressing: Moderate assistance  Grooming: Independent  Feeding: Independent  Toileting: Needs assistance  Homemaking Assistance: Needs assistance (dtr completes cooking and cleaning)  Homemaking Responsibilities: No  Ambulation Assistance: Independent (rollator)  Active : No (daughter and public transportation)  Education: 12th grade  Occupation: On disability  Type of Occupation: nurses aide prior  [address]: watch TV       Pts personal preferences: goes by [employer] [facility]    Pts assets/resources/support system: dtr, son, grandchildren    COVERAGE INFORMATION:Payor: MEDICARE / Plan: MEDICARE PART A AND B / Product Type: *No Product type* /       NURSING  No active isolations    Weight: 195 lb (88.5 kg) / Body mass index is 30.54 kg/m². ADULT ORAL NUTRITION SUPPLEMENT; Breakfast, Lunch, Dinner; Diabetic Oral Supplement  ADULT DIET; Dysphagia - Soft and Bite Sized; 3 carb choices (45 gm/meal); No Added Salt (3-4 gm); 1800 ml    SpO2: 92 % (07/11/22 0709)    Oxygen to be continued upon discharge: Yes PRN at home  Home-going needs (nocturnal Pox, sleep study, RX, equipment): Yes    Skin Issues: Yes bruising to right shoulder and knee.  Abdominal skin fold management with micotin powder  Home-going needs (education, training, RX, Wound RN): Yes skin care    Pain Managed: Yes with tramadol    Bladder continence: No  Discharging with Howard: No   Training done: No   Urology following: Yes nephrology for dialysis   Plan/date to remove howard: No   Bladder retraining started: No    Bowel continence:  Yes  Last BM date: 7/9/2022  Need for bowel program: No    Anticoagulants: Aspirin  Home-going needs (Education, labs): Yes education    Antibiotics: IV Vanco with dialysis  Stop date: Per Dr Opal Ku continue for 3 more weeks as of 7/10/2022  Home-going needs (education, RX, PICC): Yes education      Other: dialysis Tuesday Thursday and Saturday      PHYSICAL THERAPY  Bed mobility:  Bed mobility  Rolling to Left: Modified independent (07/10/22 1146)  Rolling to Right: Modified independent (07/10/22 1146)  Supine to Sit: Stand by assistance (07/10/22 1139)  Sit to Supine: Stand by assistance (07/10/22 1139)  Bed Mobility Comments: HOB flat; no rail used; increased time and effort (07/10/22 1139)  Transfers:  Transfers  Sit to Stand: Stand by assistance (07/10/22 1143)  Stand to sit: Stand by assistance (07/10/22 1143)  Bed to Chair: Stand by assistance (07/10/22 1143)  Comment: rollator used (07/10/22 1143)  Gait:   Ambulation  Surface: level tile (07/10/22 1144)  Device: Rollator (07/10/22 1144)  Assistance: Stand by assistance (07/10/22 1144)  Quality of Gait: wide JAKE (07/10/22 1144)  Gait Deviations: Slow Tere;Decreased step length;Decreased step height (07/10/22 1144)  Distance: 60ft- pt exhibiting fatigue with this distance (07/10/22 1144)  Stairs:  Stairs/Curb  Stairs?: No (07/10/22 1144)  W/C mobility:     LTG:  Long term goal 1: Indep bed mobility  Long term goal 2: Indep transfers bed to chair with safest assistive device  Long term goal 3: Ambulate with rollator Indep on level and ramp surfaces >/= 50' to allow safe return home.   Long term goal 4: Pt will demonstrate improved score on Ugalde balance assessment 48/56 for decreased risk for falls. PT Treatment Time:  1.5 hrs      OCCUPATIONAL THERAPY    EVALUATION SELF CARE STATUS:  Hand Dominance: Left  Feeding: Unable to assess(comment) (07/10/22 1142)  Feeding Skilled Clinical Factors: pt done with breakfast (07/10/22 1142)  Grooming: Setup (07/10/22 1142)  UE Bathing: Setup (07/10/22 1142)  LE Bathing:  Moderate assistance (07/10/22 1142)  LE Bathing Skilled Clinical Factors: assist to wash buttocks and Lower legs (07/10/22 1142)  UE Dressing: Setup (07/10/22 1142)  LE Dressing: Maximum assistance (07/10/22 1142)  LE Dressing Skilled Clinical Factors: assist to thread pant legs and don socks (07/10/22 1142)  Toileting: Maximum assistance (07/10/22 1142)  Toileting Skilled Clinical Factors: pt with purwick and brief at time of eval, required assistance to clean buttocks of previous BM, declined toileting on toilet (07/10/22 1142)  Additional Comments: pts dtr assists with ADLs at home (bathing and dressing LB ADLs and toileting) (07/10/22 1142)             CURRENT SELF CARE:           LTG:  Eating  Reason if not Attempted: Not attempted due to environmental limitations  CARE Score: 10  Discharge Goal: Independent, Oral Hygiene  Assistance Needed: Setup or clean-up assistance  CARE Score: 5  Discharge Goal: Independent, [address] needed: Substantial/maximal assistance  Comment: purwick in place and brief on pt, needed assistance to clean following previous BM  CARE Score: 2  Discharge Goal: Supervision or touching assistance, Shower/Bathe Self  Assistance Needed: Partial/moderate assistance  CARE Score: 3  Discharge Goal: Independent  Upper Body Dressing  Assistance Needed: Setup or clean-up assistance  CARE Score: 5  Discharge Goal: Independent, Lower Body Dressing  Assistance Needed: Partial/moderate assistance  CARE Score: 3  Discharge Goal: Independent, Putting On/Taking Off Footwear  Assistance Needed: Substantial/maximal assistance  CARE Score: 2  Discharge Goal: Independent, Toilet Transfer  Reason if not Attempted: Patient refused  CARE Score: 7  Discharge Goal: Independent  OT Treatment Time: 1.5 hrs      SPEECH THERAPY       Comprehension: Exceptions  Verbal Expression: Exceptions to functional limits      Diet/Swallow:        Dysphagia Outcome Severity Scale: Level 5: Mild dysphagia- Distant supervision. May need one diet consistency restricted    Compensatory Swallowing Strategies : Upright as possible for all oral intake,Small bites/sips,Swallow 2 times per bite/sip  Therapeutic Interventions: Bolus control exercises,Pharyngeal exercises,Diet tolerance monitoring,Patient/Family education      LTG:  Long-term Goals  Timeframe for Long-term Goals: 1-2 weeks  Goal 1: Patient will demonstrate functional cognitive-linguistic abilities in all opportunities with modified independence in order to safely complete ADLs. Long-term Goals  Timeframe for Long-term Goals: 1-2 weeks  Goal 1: Patient will tolerate the least restrictive diet without adverse outcomes. COGNITION  OT: Cognition Comment: comp: SUP, exp: SUP, soc int: MIN A, prob solving: MIN A, mem: SUP  SP:        RECREATIONAL THERAPY  Attendance to recreational therapy programs:    []  Pet Therapy  [] Music Therapy  [] Art Therapy    [] Recreation Therapy Group [] Support Group           Patient social interaction (mood, participation): good    Patient strengths: dtr is caregiver and supportive    Patients goal: return home with dtr    Problems/Barriers: dtr is also caregiver for pt's son who is blind and has cerebral palsy        1. Safety:          - Intervention / Plan:    [x]  falls protocol     [x]  PT/OT    [x]  SP        - Results:         2. Potential DME needs:         - Intervention / Plan:  [x]  PT/OT     [x]  Assess equipment needs/access       - Results:         3. Weakness:          - Intervention / Plan:  [x]  PT/OT      []  Other:         - Results:         4.   Discharge planning needs:          - Intervention / Plan:  [x]  Weekly team conference      [x]  family training        - Results:         5.            - Intervention / Plan:          - Results:         6.            - Intervention / Plan:         - Results:         7.            - Intervention / Plan:         - Results:           Discharge Plan   Estimated Length of Stay: TBD    Tentative Discharge date: 7/16/22      Anticipated Discharge Destination:  Home      Team recommendations:    1. Follow up Therapy :    PT  OT  SLP  RN  Social Work  [facility]    2. Home Health    Other:     Equipment needed at Discharge:  Other: TBD      Team Members Present at Conference:    Physician: Dr. Donal Tolbert Worker: STEPHENIE Stephenson, LSW  RN: Shena Simpson RN  Physical Therapist: Warden Cruz, RUDY  Occupational Therapist: Eugene Litten, OTR  Speech Therapist: ANN MARIE Martinez     Electronically signed by STEPHENIE Stephenson, AFUAW on 7/11/2022 at 4:35 PM

## 2022-07-11 NOTE — PROGRESS NOTES
Physical Therapy  Facility/Department: [facility] MED SURG B984/J014-33  Physical Therapy Discharge      NAME: Mariposa Castro    : 1960 (14 y.o.)  MRN: [id number]    Account: [de-identified]  Gender: female      Patient has been discharged from acute care hospital. DC patient from current PT program.      Electronically signed by Deann Cooper PT on 22 at 3:20 PM EDT

## 2022-07-11 NOTE — PROGRESS NOTES
Nephrology Progress Note    Assessment:  ESRDX  OHDx tricuspid repair  CABGx  VRE septicemia                                   DM type-2  Anemia      Plan: will do dialysis tomorrow    Patient Active Problem List:     Atherosclerotic heart disease of native coronary artery with unspecified angina pectoris (HCC)     Schizophrenia, paranoid, chronic     Metabolic syndrome     Vitamin B 12 deficiency     Cerebral microvascular disease     Mixed hyperlipidemia     Other hammer toe (acquired)     Vitamin D insufficiency     Incontinence of urine     Diabetic nephropathy with proteinuria ([address]     Essential (primary) hypertension     History of type C viral hepatitis     Urinary incontinence due to cognitive impairment     History of seizures     Stented coronary artery-plan is to stay on Plavix indefinately per Dr Kennedi Dixon     Other specified diabetes mellitus with diabetic neuropathy, unspecified ([address]     Controlled type 2 diabetes mellitus with diabetic neuropathy, with long-term current use of insulin (HCC)     Hemiparesis, left (HCC)     Angina, class II ([address]     Pain, unspecified     Tardive dyskinesia     Shortness of breath     Chronic diastolic congestive heart failure (HCC)     Sleep apnea, unspecified     Pulmonary hypertension, unspecified (HCC)     Class 2 severe obesity with serious comorbidity and body mass index (BMI) of 36.0 to 36.9 in adult [facility])     Edema     Closed supracondylar fracture of right humerus     Other chronic pain     Palliative care patient     Recurrent falls     Renal failure     Difficulty in walking     ESRD (end stage renal disease) on dialysis ([address]     Weakness     Other seizures (HCC)     Moderate persistent asthma without complication     [id number]     Post PTCA     Falls frequently     Chest wall contusion, left, initial encounter     Headache, unspecified     Paranoid schizophrenia ([address]     Compression fracture of spine ([address]     Closed rib fracture     Depression     Chronic obstructive pulmonary disease (HCC)     Critical illness polyneuropathy ([facility])     Multiple closed fractures of ribs of right side     Nonrheumatic mitral valve regurgitation     Nonrheumatic tricuspid valve regurgitation     Need for extended care facility     Chronic pain of both shoulders     Hemodialysis-associated hypotension     Anginal chest pain at rest [facility])     Chest pain     Unstable angina ([facility])     NSTEMI (non-ST elevated myocardial infarction) ([facility])     CKD (chronic kidney disease) stage 4, GFR 15-29 ml/min ([facility])     Hyperkalemia     Impaired mobility and activities of daily living dt polyneuropathy and reccent fall      Dialysis patient [facility])     Unspecified open wound of right upper arm, initial encounter     Multiple closed fractures of right lower extremity and ribs     Closed T11 fracture ([facility])     Encephalopathy acute     MRSA bacteremia     Sepsis due to Enterococcus ([address]     Local infection due to central venous catheter     DJD (degenerative joint disease), cervical     Closed head injury     Sarcopenia     Fall from standing     Septicemia ([address]     Chronic hepatitis C ([address]     Catheter-related bloodstream infection     Enterococcus faecalis infection      Subjective:  Admit Date: 7/9/2022    Interval History: no issues    Medications:  Scheduled Meds:   traMADol  25 mg Oral 3 times per day    acetaminophen  500 mg Oral 3 times per day    Vitamin D  2,000 Units Oral Dinner    cyanocobalamin  1,000 mcg IntraMUSCular Weekly    coenzyme Q10  100 mg Oral Daily    heparin (porcine)  2,000 Units IntraVENous Once    ARIPiprazole  5 mg Oral Daily    aspirin EC  81 mg Oral Daily    atorvastatin  40 mg Oral Nightly    insulin glargine  35 Units SubCUTAneous Nightly    insulin lispro  0-12 Units SubCUTAneous TID WC    insulin lispro  0-6 Units SubCUTAneous Nightly    isosorbide mononitrate  120 mg Oral Daily    lidocaine  3 patch TransDERmal Daily    magnesium oxide  400 mg Oral Daily  melatonin  10 mg Oral Nightly    miconazole   Topical BID    midodrine  5 mg Oral Once per day on Mon Wed Fri    polyethylene glycol  17 g Oral Daily    sertraline  50 mg Oral Nightly    vancomycin (VANCOCIN) intermittent dosing (placeholder)   Other RX Placeholder     Continuous Infusions:   dextrose         CBC:   Recent Labs     07/09/22  0527 07/10/22  0443   WBC 9.6 9.4   HGB 9.8* 9.7*    278     CMP:    Recent Labs     07/09/22 0527 07/09/22 1953   * 132*   K 4.6 4.7   CL 92* 91*   CO2 27 26   BUN 18 25*   CREATININE 2.79* 3.43*   GLUCOSE 122* 224*   CALCIUM 10.0* 9.8   LABGLOM 17.1* 13.4*     Troponin: No results for input(s): TROPONINI in the last 72 hours. BNP: No results for input(s): BNP in the last 72 hours. INR: No results for input(s): INR in the last 72 hours. Lipids: No results for input(s): CHOL, LDLDIRECT, TRIG, HDL, AMYLASE, LIPASE in the last 72 hours. Liver:   Recent Labs     07/09/22 1953   LABALBU 3.9     Iron:  No results for input(s): IRONS, FERRITIN in the last 72 hours. Invalid input(s): LABIRONS  Urinalysis: No results for input(s): UA in the last 72 hours.     Objective:  Vitals: /63   Pulse 89   Temp 97.7 °F (36.5 °C) (Oral)   Resp 17   Ht 5' 7\" (1.702 m)   Wt 195 lb (88.5 kg)   LMP  (LMP Unknown)   SpO2 92%   BMI 30.54 kg/m²    Wt Readings from Last 3 Encounters:   07/09/22 195 lb (88.5 kg)   07/08/22 189 lb 9.5 oz (86 kg)   06/22/22 217 lb (98.4 kg)      24HR INTAKE/OUTPUT:      Intake/Output Summary (Last 24 hours) at 7/11/2022 1002  Last data filed at 7/10/2022 1458  Gross per 24 hour   Intake --   Output 250 ml   Net -250 ml       General: alert, in no apparent distress  HEENT: normocephalic, atraumatic, anicteric  Neck: supple, no mass  Lungs: non-labored respirations, clear to auscultation bilaterally  Heart: regular rate and rhythm, no murmurs or rubs  Abdomen: soft, non-tender, non-distended  Ext: no cyanosis, no peripheral edema  Neuro: alert and oriented, no gross abnormalities  Psych: normal mood and affect  Skin: no rash      Electronically signed by Montse Dawn DO, MD

## 2022-07-11 NOTE — DISCHARGE SUMMARY
Discharge Summary    Patient Identification:  Patient: Lashell Marroquin  : 1960  MRN: [id number]   Account: [de-identified]     Admit date: 2022  Discharge date: 2022   Attending provider: No att. providers found        Primary care provider: Berry Edwards MD     History of Present Illness: clotted graft    Admission Diagnoses:  Hyperkalemia  ESRDx  OHDX CABGx  Tricuspid sx TIA  DM type-2      Discharge Diagnoses:    Active Hospital Problems    Diagnosis Date Noted    Hyperkalemia [E87.5] 2022     Priority: Medium       Procedures:   fistulogram  Permanent dialysis catheter     Consults:   IP CONSULT TO INTERVENTIONAL RADIOLOGY     Examination: on chart       Significant Diagnostics:   Labs:   Recent Results (from the past 72 hour(s))   POCT Glucose    Collection Time: 22 11:56 AM   Result Value Ref Range    POC Glucose 65 (L) 70 - 99 mg/dl    Performed on ACCU-CHEK    POCT Glucose    Collection Time: 22  4:03 PM   Result Value Ref Range    POC Glucose 146 (H) 70 - 99 mg/dl    Performed on ACCU-CHEK    POCT Glucose    Collection Time: 22  4:17 PM   Result Value Ref Range    POC Glucose 155 (H) 70 - 99 mg/dl    Performed on ACCU-CHEK    POCT Glucose    Collection Time: 22  8:00 PM   Result Value Ref Range    POC Glucose 208 (H) 70 - 99 mg/dl    Performed on ACCU-CHEK    CBC    Collection Time: 22  5:27 AM   Result Value Ref Range    WBC 9.6 4.8 - 10.8 K/uL    RBC 2.99 (L) 4.20 - 5.40 M/uL    Hemoglobin 9.8 (L) 12.0 - 16.0 g/dL    Hematocrit 27.0 (L) 37.0 - 47.0 %    MCV 90.3 82.0 - 100.0 fL    MCH 32.7 (H) 27.0 - 31.3 pg    MCHC 36.2 33.0 - 37.0 %    RDW 14.3 11.5 - 14.5 %    Platelets 370 541 - 035 K/uL   Renal Function Panel    Collection Time: 22  5:27 AM   Result Value Ref Range    Sodium 130 (L) 135 - 144 mEq/L    Potassium 4.6 3.4 - 4.9 mEq/L    Chloride 92 (L) 95 - 107 mEq/L    CO2 27 20 - 31 mEq/L    Anion Gap 11 9 - 15 mEq/L    Glucose 122 (H) 70 - 99 mg/dL    BUN 18 8 - 23 mg/dL    CREATININE 2.79 (H) 0.50 - 0.90 mg/dL    GFR Non-African American 17.1 (L) >60    GFR  20.6 (L) >60    Calcium 10.0 (H) 8.5 - 9.9 mg/dL    Phosphorus 3.3 2.3 - 4.8 mg/dL    Albumin 3.6 3.5 - 4.6 g/dL   Magnesium    Collection Time: 07/09/22  5:27 AM   Result Value Ref Range    Magnesium 2.0 1.7 - 2.4 mg/dL   POCT Glucose    Collection Time: 07/09/22  8:23 AM   Result Value Ref Range    POC Glucose 116 (H) 70 - 99 mg/dl    Performed on ACCU-CHEK    POCT Glucose    Collection Time: 07/09/22 11:17 AM   Result Value Ref Range    POC Glucose 126 (H) 70 - 99 mg/dl    Performed on ACCU-CHEK    COVID-19, Rapid    Collection Time: 07/09/22  4:11 PM    Specimen: Nasopharyngeal Swab; Nasal swab   Result Value Ref Range    SARS-CoV-2, NAAT Not Detected Not Detected   Renal Function Panel    Collection Time: 07/09/22  7:53 PM   Result Value Ref Range    Sodium 132 (L) 135 - 144 mEq/L    Potassium 4.7 3.4 - 4.9 mEq/L    Chloride 91 (L) 95 - 107 mEq/L    CO2 26 20 - 31 mEq/L    Anion Gap 15 9 - 15 mEq/L    Glucose 224 (H) 70 - 99 mg/dL    BUN 25 (H) 8 - 23 mg/dL    CREATININE 3.43 (H) 0.50 - 0.90 mg/dL    GFR Non-African American 13.4 (L) >60    GFR  16.3 (L) >60    Calcium 9.8 8.5 - 9.9 mg/dL    Phosphorus 3.4 2.3 - 4.8 mg/dL    Albumin 3.9 3.5 - 4.6 g/dL   POCT Glucose    Collection Time: 07/09/22  8:04 PM   Result Value Ref Range    POC Glucose 249 (H) 70 - 99 mg/dl    Performed on ACCU-CHEK    CBC    Collection Time: 07/10/22  4:43 AM   Result Value Ref Range    WBC 9.4 4.8 - 10.8 K/uL    RBC 2.97 (L) 4.20 - 5.40 M/uL    Hemoglobin 9.7 (L) 12.0 - 16.0 g/dL    Hematocrit 26.9 (L) 37.0 - 47.0 %    MCV 90.6 82.0 - 100.0 fL    MCH 32.7 (H) 27.0 - 31.3 pg    MCHC 36.1 33.0 - 37.0 %    RDW 14.2 11.5 - 14.5 %    Platelets 004 407 - 299 K/uL   Culture, Urine    Collection Time: 07/10/22  4:51 AM    Specimen: Urine, clean catch   Result Value Ref Range    Urine Culture, Routine       Cult,Urine:  NO SIGNIFICANT GROWTH  Performed at [facility], [address] [place] [address]  [phone]     Urinalysis    Collection Time: 07/10/22  4:51 AM   Result Value Ref Range    Color, UA Yellow Straw/Yellow    Clarity, UA Clear Clear    Glucose, Ur 100 (A) Negative mg/dL    Bilirubin Urine Negative Negative    Ketones, Urine Negative Negative mg/dL    Specific Gravity, UA 1.009 1.005 - 1.030    Blood, Urine Negative Negative    pH, UA 8.0 5.0 - 9.0    Protein, UA 30 (A) Negative mg/dL    Urobilinogen, Urine 1.0 <2.0 E.U./dL    Nitrite, Urine Negative Negative    Leukocyte Esterase, Urine Negative Negative   Microscopic Urinalysis    Collection Time: 07/10/22  4:51 AM   Result Value Ref Range    Bacteria, UA Negative Negative /HPF    Hyaline Casts, UA 0-1 0 - 5 /HPF    WBC, UA 0-2 0 - 5 /HPF    RBC, UA 3-5 (A) 0 - 5 /HPF    Epithelial Cells, UA 0-2 0 - 5 /HPF   POCT Glucose    Collection Time: 07/10/22  6:30 AM   Result Value Ref Range    POC Glucose 158 (H) 70 - 99 mg/dl    Performed on ACCU-CHEK    POCT Glucose    Collection Time: 07/10/22 11:20 AM   Result Value Ref Range    POC Glucose 255 (H) 70 - 99 mg/dl    Performed on ACCU-CHEK    POCT Glucose    Collection Time: 07/10/22  4:19 PM   Result Value Ref Range    POC Glucose 204 (H) 70 - 99 mg/dl    Performed on ACCU-CHEK    POCT Glucose    Collection Time: 07/10/22  9:03 PM   Result Value Ref Range    POC Glucose 220 (H) 70 - 99 mg/dl    Performed on ACCU-CHEK    Vancomycin Level, Random    Collection Time: 07/11/22  4:23 AM   Result Value Ref Range    Vancomycin Rm 14.7 10.0 - 40.0 ug/mL   POCT Glucose    Collection Time: 07/11/22  6:07 AM   Result Value Ref Range    POC Glucose 222 (H) 70 - 99 mg/dl    Performed on ACCU-CHEK      Radiology: No results found.     Hospital Course:   Jayna Culver is a 57 y.o. female admitted to [facility] on 6/2/2022for clotted graft right arm. Dr Iris Story tried unsuccessfully. Had permanent dialysis catheter placed one treatment and discharged to home        Assessment:  Active Hospital Problems    Diagnosis Date Noted    Hyperkalemia [E87.5] 06/02/2022     Priority: Medium         Plan: follow in dialysis unit  Medications:  Discharge Medication List as of 6/6/2022  5:16 PM      CONTINUE these medications which have NOT CHANGED    Details   furosemide (LASIX) 20 MG tablet Take 1 tablet by mouth 2 times daily, Disp-60 tablet, R-0Normal      ARIPiprazole (ABILIFY) 5 MG tablet TAKE 1 TABLET BY MOUTH ONCE DAILY, Disp-30 tablet, R-0Normal      NYAMYC 079389 UNIT/GM powder APPLY TO AFFECTED AREA OF ABDOMINAL FOLDS EVERY 12 HOURS AS NEEDED, Disp-60 g, R-5Normal      !! insulin aspart (NOVOLOG FLEXPEN) 100 UNIT/ML injection pen 15 UNITS PLUS SLIDING SCALE   LESS THAN 180 NONE  181-250 2 UNITS  251-300 4 UNITS  301-400 6 UNITS   401-500 10 UNITS, Disp-10 pen, R-3Normal      isosorbide mononitrate (IMDUR) 30 MG extended release tablet Take 4 tablets by mouth daily, Disp-30 tablet, R-3Normal      atorvastatin (LIPITOR) 40 MG tablet TAKE 1 TABLET BY MOUTH DAILY, Disp-30 tablet, R-12Normal      magnesium oxide (MAG-OX) 400 MG tablet TAKE 1 TABLET BY MOUTH DAILY, Disp-30 tablet, R-12Normal      loperamide (RA ANTI-DIARRHEAL) 2 MG capsule Take 1 capsule by mouth 4 times daily as needed for Diarrhea, Disp-120 capsule, R-1Normal      potassium chloride (KLOR-CON M) 20 MEQ extended release tablet Take 2 tablets by mouth 2 times daily (with meals), Disp-60 tablet, R-3Normal      melatonin 10 MG CAPS capsule Take 1 capsule by mouth nightly, Disp-30 capsule, R-12Normal      midodrine (PROAMATINE) 5 MG tablet Take 1 tablet by mouth one time a day every Tue, Thu, Sat for hypotension. Give 30 mins prior to dialysis. , Disp-90 tablet, R-3Normal      Handicap Placard MISC Starting Wed 3/16/2022, Disp-1 each, R-0, PrintExpiration in 5 years. sertraline (ZOLOFT) 50 MG tablet TAKE 1 TABLET BY MOUTH DAILY, Disp-30 tablet, R-2Normal      lidocaine-prilocaine (EMLA) 2.5-2.5 % cream Apply topically as needed for Pain Apply topically as needed. 3 times a week before dialysis wrap with saran wrap, Topical, PRN, Historical Med      albuterol (PROVENTIL) (2.5 MG/3ML) 0.083% nebulizer solution Take 3 mLs by nebulization every 4 hours as needed for Wheezing, Disp-120 each, R-3Print      hydrOXYzine (ATARAX) 25 MG tablet TAKE ONE TABLET BY MOUTH TWO TIMES A DAYHistorical Med      !! insulin glargine (LANTUS SOLOSTAR) 100 UNIT/ML injection pen 70 UNITS AT BEDTIME, Disp-30 pen, R-3Normal      !! blood glucose test strips (ONETOUCH VERIO) strip QID, Disp-300 each, R-3Normal      !! OneTouch Delica Lancets 13I MISC QID, Disp-300 each, R-3Normal      Blood Glucose Monitoring Suppl (ONETOUCH VERIO) w/Device KIT AS DIRECTED, Disp-1 kit, R-0Normal      Insulin Pen Needle (SURE COMFORT PEN NEEDLES) 30G X 8 MM MISC USE AS DIRECTED FIVE TIMES A DAILY, Disp-100 each, R-10Normal      b complex-C-folic acid (NEPHROCAPS) 1 MG capsule Take 1 capsule by mouth dailyHistorical Med      !! LANTUS SOLOSTAR 100 UNIT/ML injection pen 55 units at bedtime, Disp-10 pen, R-10, DAWNormal      metoprolol tartrate (LOPRESSOR) 25 MG tablet TAKE 1/2 TABLET BY MOUTH TWO TIMES DAILY , Disp-90 tablet, R-4Normal      !! insulin aspart (NOVOLOG FLEXPEN) 100 UNIT/ML injection pen INJECT 8-10  units with each meals, Disp-10 pen, R-3Normal      aspirin EC 81 MG EC tablet Take 1 tablet by mouth daily, Disp-30 tablet, R-12Normal      !! blood glucose test strips (FREESTYLE LITE) strip 1 each by Does not apply route 4 times daily (before meals and nightly) As needed. , Disp-200 strip, R-5**Patient requests 90 days supply**Normal      Alcohol Swabs (EASY TOUCH ALCOHOL PREP MEDIUM) 70 % PADS Disp-100 each, R-3, NormalUSE AS DIRECTED THREE TIMES A DAY      !!  FreeStyle Lancets MISC Disp-150 each, R-3, NormalTest 4x daily      acetaminophen (TYLENOL) 325 MG tablet Take 2 tablets by mouth every 4 hours as needed for Pain (For mild to moderate pain (Pain 1-6 out of 10 on pain scale)), Disp-120 tablet, R-0Print      Blood Glucose Monitoring Suppl (FREESTYLE LITE) CORETTA DAILY PRN Starting Tue 12/29/2020, Disp-1 Device, R-0, NormalUse freestyle meter to test blood sugar as needed       nitroGLYCERIN (NITROSTAT) 0.4 MG SL tablet Place 1 tablet under the tongue every 5 minutes as needed for Chest painHistorical Med       !! - Potential duplicate medications found. Please discuss with provider. STOP taking these medications       pregabalin (LYRICA) 75 MG capsule Comments:   Reason for Stopping:         psyllium (METAMUCIL SMOOTH TEXTURE) 58.6 % powder Comments:   Reason for Stopping:         ondansetron (ZOFRAN) 4 MG tablet Comments:   Reason for Stopping:         vitamin B-12 (CYANOCOBALAMIN) 100 MCG tablet Comments:   Reason for Stopping:         fluticasone (FLONASE) 50 MCG/ACT nasal spray Comments:   Reason for Stopping:         SITagliptin (JANUVIA) 50 MG tablet Comments:   Reason for Stopping:         triamcinolone (KENALOG) 0.1 % cream Comments:   Reason for Stopping:         pantoprazole (PROTONIX) 20 MG tablet Comments:   Reason for Stopping: Follow-up visits: No follow-up provider specified.       Electronically signed by Liay Troncoso DO on 7/11/2022 at 11:48 AM

## 2022-07-11 NOTE — PROGRESS NOTES
Physical Therapy Rehab Treatment Note  Facility/Department: [facility]  Room: I648/L999-56       NAME: Poilna Gardiner  : 1960 (9 y.o.)  MRN: [id number]  CODE STATUS: Full Code    Date of Service: 2022     Restrictions:  Restrictions/Precautions: Fall Risk  Position Activity Restriction  Other position/activity restrictions: L Rib fractures - 10 and 11 per patient    SUBJECTIVE:   Subjective: \"I would like to stay about a week\"    Pain  Pain: 10 R LBP    OBJECTIVE:   Sit to Supine  Assistance Level: Independent    Sit to Stand  Assistance Level: Supervision  Stand to Sit  Assistance Level: Supervision  Bed To/From Chair  Assistance Level: Supervision    Ambulation  Surface: Carpet; Ramp  Device: Rollator  Distance: 50' x 2  Activity: Within Unit  Activity Comments: Initially antalgic improved with distance mild flexed posture gaze down at floor. Increased L knee pain with gait. RN notified and medicated  Additional Factors: Verbal cues  Assistance Level: Stand by assist;Supervision  Gait Deviations: Slow jean    Stairs  Stair Height:  (Not applicable)    Balance  Sitting Balance: Independent  Standing Balance: Stand by assistance  Standing Balance  Outcomes Measures:  Ugalde Balance Score: 30     PT Exercises  Exercise Treatment: Supine Exercises: Quad sets, Glute sets, Heel slides, Hip abduction, SLR, abdominal bracing x 20 each   Activity Tolerance  Activity Tolerance: Patient limited by endurance    ASSESSMENT/PROGRESS TOWARDS GOALS:  Assessment  Assessment: Patient with elevated L knee pain during gait training this p.m. RN notified and medicated patient. Supine ther ex completed to increase BLE hip and LE strength for improved gait and tolerance  Activity Tolerance: Patient limited by endurance  Discharge Recommendations: Continue to assess pending progress    Goals:  Long Term Goals  Long term goal 1: Indep bed mobility  Long term goal 2:  Indep transfers bed to chair with safest assistive device  Long term goal 3: Ambulate with rollator Indep on level and ramp surfaces >/= 50' to allow safe return home. Long term goal 4: Pt will demonstrate improved score on Ugalde balance assessment 48/56 for decreased risk for falls. PLAN OF CARE/Safety:   Safety Devices  Type of Devices:  All fall risk precautions in place      Therapy Time:   Individual   Time In 1330   Time Out 1400   Minutes 30     Therapy Time   Individual Concurrent Group Co-treatment   Time In 1500         Time Out 1515         Minutes 15           Patient seen 15 extra minutes  Minutes: 45  Transfer/Bed mobility training: 10  Gait trainin  Therapeutic ex: [address] [place], PTA, 22 at 3:29 PM

## 2022-07-11 NOTE — CARE COORDINATION
Social/Functional Status:  Social/Functional History  Lives With: Daughter,Son,Family (pt states she is never home alone, pt's dtr, handicapped son, and grandchildren (23 y/o, 24 y/o, and 15 y/o))  Type of Home: House  Home Layout: Two level,Able to Live on Main level with bedroom/bathroom,Performs ADL's on one level (pt has first floor set up)  Home Access: Ramped entrance  Bathroom Shower/Tub:  (washes up at sink)  Bathroom Toilet: Standard  Bathroom Accessibility: Walker accessible  Home Equipment: Hospital bed,Rollator  Has the patient had two or more falls in the past year or any fall with injury in the past year?: Yes  Receives Help From: Family (dtr is official HHA)  ADL Assistance: Needs assistance (assist with socks/brief/pans over feet, bra. Assist for hygiene after BM and with bathing)  Bath: Moderate assistance  Dressing: Moderate assistance  Grooming: Independent  Feeding: Independent  Toileting: Needs assistance (dtr will perform toilet hygiene after BM)  Homemaking Assistance: Needs assistance (dtr completes cooking and cleaning)  Homemaking Responsibilities: No  Ambulation Assistance: Independent (rollator)  Active : No (daughter and public transportation)  Patient's  Info: [facility] bus for dialysis days (T, Thur, Sat) and doctor's appointments  Mode of Transportation: [place] (dtr drives a van)  Education: 12th grade  Occupation: On disability (has been on disability for 20 years--injured back and knee and had mental instability per pt)  Type of Occupation: nurses aide prior  [address]: watch TV    Spoke with patient and explained role in the team. Patient questions answered appropriately. Explained discharge process. Patient stated understanding. Pt goes by [employer] [place]". 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  Patient plans to return to her dtr's two level home where pt has first floor set up, there is a ramped entrance. Pt resides with her dtr, son (who is blind and has cerebral palsy), 24 y/o grandchild, 24 y/o grandchild, and 15 y/o grandchild. Pt's daughter is her caregiver and she gets reimbursed through waiver services (40 hours a week). Pt noted that her daughter is also the caregiver for pt's son. Per pt, her son needs assistance with almost all ADLs and all IADLs, pt's son is able to feed himself. Pt's dtr assisted pt with bathing, dressing, toilet hygiene after BM, cooking, cleaning, laundry, med mgmt (filled pill organizer and provided reminds for pt to self admin), and finance mgmt. Pt goes to dialysis at [facility] in [place] on [id number], [facility], and Sat and is transported by [facility]. Pt's dtr will transport her at times to go shopping. Pt gave permission for LSW to speak with dtr, LSW called pt's dtr and left a voicemail.  Electronically signed by STEPHENIE Thornton Arm, LSW on 7/11/2022 at 12:29 PM

## 2022-07-11 NOTE — PROGRESS NOTES
OCCUPATIONAL THERAPY  INPATIENT REHAB TREATMENT NOTE  [facility] [employer]      NAME: Jenna Medina  : 1960 (88 y.o.)  MRN: [id number]  CODE STATUS: Full Code  Room: R248/R248-01    Date of Service: 2022    Referring Physician: Dr. Tito Brush  Rehab Diagnosis: impaired mobility and ADLs d/t polyneuropathy and recent fall    Restrictions  Restrictions/Precautions  Restrictions/Precautions: Fall Risk         Position Activity Restriction  Other position/activity restrictions: L Rib fractures - 10 and 11 per patient    Patient's date of birth confirmed: Yes    SAFETY:  Safety Devices  Safety Devices in place: Yes  Type of devices: All fall risk precautions in place    SUBJECTIVE:  Subjective: \"They're about to give me a pain pill. \"  Pain: 5/10 back,knees    Pain at start of treatment: Yes: 5/10    Pain at end of treatment: Yes: 10    Location: back/knees  Nursing notified: Yes  RN: Agapito Buitrago  Intervention: RN provided pain medication    COGNITION:         Pt's current cognitive status is:  Comprehension: Mod I  Expression: Mod I  Social Interaction: Mod I  Problem Solving: Min A  Memory: Min A    OBJECTIVE:     Pt completed sponge bath ADL at the below level. Pt completed therapeutic activities table top to increase strength/endurance and cognition for functional tasks. Pt completed 12 piece block and bolt design from visual model with min verbal cues provided to assemble structure correctly. Pt repetitively reached across table top level to increase strength/endurance for functional tasks and demonstrated min difficulty manipulating wing nuts.      Feeding  Assistance Level: Set-up  Grooming/Oral Hygiene  Assistance Level: Stand by assist  Upper Extremity Bathing  Assistance Level: Stand by assist  Lower Extremity Bathing  Assistance Level: Minimal assistance  Skilled Clinical Factors: B feet  Upper Extremity Dressing  Assistance Level: Minimal assistance  Skilled Clinical Factors: untangle shirt  Lower Extremity Dressing  Assistance Level: Minimal assistance (untangle/thread LLE through clothing)  Putting On/Taking Off Footwear  Equipment Provided: Sock aid  Assistance Level: Maximum assistance  Skilled Clinical Factors: VC for correct use of SA; physical assist to untangle R foot and don sock correctly; total assist to don shoes B'ly  Toileting  Assistance Level: Minimal assistance  Toilet Transfers  Technique: Stand pivot  Equipment: Standard toilet  Additional Factors:  With handrails;Verbal cues  Assistance Level: Contact guard assist  Tub/Shower Transfers  Skilled Clinical Factors: NT sponge bath only         Functional Mobility  Device: Wheelchair  Activity: To/From bathroom  Assistance Level: Dependent  Sit to Stand  Assistance Level: Stand by assist  Stand to Sit  Assistance Level: Stand by assist                Education:  Education  Education Given To: Patient  Education Provided: Equipment  Education Provided Comments: Sock aid  Education Method: Demonstration;Verbal  Education Outcome: Continued education needed    Equipment recommendations:  OT Equipment Recommendations  Other: continue to assess      ASSESSMENT:  Assessment: verbal cues required for problem solving and safety  Activity Tolerance: Patient limited by endurance      PLAN OF CARE:  Strengthening,Balance training,Functional mobility training,Endurance training,Safety education & training,Patient/Caregiver education & training,Equipment evaluation, education, & procurement,Self-Care / ADL,Home management training  continue with current POC    Patient goals : work on strengthening my arms and legs, and improve balance so I don't fall           Therapy Time:   Individual Group Co-Treat   Time In 08       Time Out 0930         Minutes 60                   ADL/IADL trainin minutes  Therapeutic activities: 20 minutes     Electronically signed by:    Vadim Steel OT,   2022, 9:26 AM

## 2022-07-11 NOTE — PROGRESS NOTES
Pt complained of nausea this am. Pt medicated with tigan 200 mg im in right posterior gluteal muscle. Electronically signed by Lola Blanco.  Gretchen Hunter on 7/11/2022 at 7:43 AM

## 2022-07-11 NOTE — CARE COORDINATION
LSW met with pt and discussed discharge date of 7/16, pt is requesting to discharge on 7/17 instead as she has dialysis on 7/16 and will be \"wiped out\". LSW to discuss with IDT regarding moving discharge date to 7/17.  Electronically signed by Darylene Right, MSW, H[place] on 7/11/2022 at 4:37 PM

## 2022-07-11 NOTE — PLAN OF CARE
Problem: Discharge Planning  Goal: Discharge to home or other facility with appropriate resources  Outcome: Progressing     Problem: Safety - Adult  Goal: Free from fall injury  Outcome: Progressing     Problem: Skin/Tissue Integrity  Goal: Absence of new skin breakdown  Description: 1. Monitor for areas of redness and/or skin breakdown  2. Assess vascular access sites hourly  3. Every 4-6 hours minimum:  Change oxygen saturation probe site  4. Every 4-6 hours:  If on nasal continuous positive airway pressure, respiratory therapy assess nares and determine need for appliance change or resting period.   Outcome: Progressing     Problem: ABCDS Injury Assessment  Goal: Absence of physical injury  Outcome: Progressing     Problem: Nutrition Deficit:  Goal: Optimize nutritional status  Outcome: Progressing     Problem: Chronic Conditions and Co-morbidities  Goal: Patient's chronic conditions and co-morbidity symptoms are monitored and maintained or improved  Outcome: Progressing

## 2022-07-11 NOTE — PROGRESS NOTES
Physical Therapy Rehab Treatment Note  Facility/Department: [facility]  [facility]       NAME: Ilia Retana  : 1960 (60 y.o.)  MRN: [id number]  CODE STATUS: Full Code    Date of Service: 2022     Restrictions:  Restrictions/Precautions: Fall Risk  Position Activity Restriction  Other position/activity restrictions: L Rib fractures - 10 and 11 per patient    SUBJECTIVE:   Subjective: \"I would like to stay about a week\"    Pain  Pain: 6/10 R LBP    OBJECTIVE:   Roll Left  Assistance Level: Independent  Roll Right  Assistance Level: Independent  Sit to Supine  Assistance Level: Independent  Supine to Sit  Assistance Level: Independent  Scooting  Assistance Level: Independent    Sit to Stand  Assistance Level: Supervision  Stand to Sit  Assistance Level: Supervision  Bed To/From Chair  Assistance Level: Supervision  Car Transfer  Assistance Level: Supervision;Stand by assist  Skilled Clinical Factors: Increased effort from low level surface fair safety    Ambulation  Surface: Carpet; Ramp  Device: Rollator  Distance: 50', 40'  Activity: Within Unit  Activity Comments: Initially antalgic improved with distance mild flexed posture gaze down at floor. Good control ascending/ descending ramp. SpO2 93% post gait. Returns to 96% with breathing techniques. Additional Factors: Verbal cues  Assistance Level: Stand by assist;Supervision  Gait Deviations: Slow jean    Stairs  Stair Height:  (Not applicable)    Balance  Sitting Balance: Independent  Standing Balance: Stand by assistance  Standing Balance  Activity: Initiated HANSON Balance- Unable to complete due to time restraints (Will completee in p.m.)    ASSESSMENT/PROGRESS TOWARDS GOALS:   Assessment  Assessment: Patient progressing towards gait and transfer goals this a.m. completing with SBA/ Supervision for safety. Initiated HANSON balance test, unable to complete secondary to time restraints  Activity Tolerance: Patient limited by endurance; Patient limited by pain  Discharge Recommendations: Continue to assess pending progress    Goals:  Long Term Goals  Long term goal 1: Indep bed mobility  Long term goal 2: Indep transfers bed to chair with safest assistive device  Long term goal 3: Ambulate with rollator Indep on level and ramp surfaces >/= 50' to allow safe return home. Long term goal 4: Pt will demonstrate improved score on Ugalde balance assessment 48/56 for decreased risk for falls. PLAN OF CARE/Safety:   Safety Devices  Type of Devices:  All fall risk precautions in place    Therapy Time:   Individual   Time In 1030   Time Out 1130   Minutes 60     Minutes: 60  Transfer/Bed mobility trainin  Gait trainin  Neuro re education: [address] [facility], \[facility]\"", 22 at 11:41 AM

## 2022-07-11 NOTE — CONSULTS
[address] [place] [address] Neurology Daily Progress Note  Name: Joshua Fraga  Age: 57 y.o. Gender: female  CodeStatus: Full Code  Allergies: Codeine  Oxycontin [Oxycodone Hcl]    Chief Complaint:No chief complaint on file. Primary Care Provider: Leann Gray MD  InpatientTreatment Team: Treatment Team: Attending Provider: Lakia Timmons DO; Consulting Physician: Dayana Beaver MD; Consulting Physician: Leann Allen DO; Consulting Physician: Saranya Bean MD; Consulting Physician: Reese Nelson MD; Registered Nurse: Guillermo Jernigan RN; Occupational Therapist: Ana Luisa Brown OTR/L; Occupational Therapist: Dimitri Tidwell OT; : Lyndsey Macias MSW, LSW; Consulting Physician: Rafia Xiao DPM  Admission Date: 7/9/2022      HPI   Consulting provider: Dr. Nayeli Nair for weakness, eval and treat, continuity care. Pt seen and examined on rehab unit for neurology consult. Patient is a 78-year-old  female with past medical history of diabetes mellitus type 2, schizophrenia, valvular heart disease, diabetic neuropathy, neurogenic bladder, hypertension, hepatitis C, hyperlipidemia, seizures, end-stage renal disease on hemodialysis, COPD, CHF, CAD status post PCI who was admitted to [facility] from 6/30/2022 until 7/9/2022 for bacteremia and lower extremity weakness with falls. NOELLE was done and negative. New dialysis catheter was placed on 7/7/2022. .  Treated with IV vancomycin. She was noted to have 10th and 11th rib fractures from her fall. MRI of the brain was negative for acute findings. MRI of the lumbar spine showed degenerative disc disease with no significant canal stenosis. MRI of the cervical spine showed severe canal stenosis with conservative therapy recommending per neurosurgery. Patient was transferred to [address] [place] [address] inpatient rehab on 7/9/2022. Currently alert and oriented x3, no acute distress, cooperative. Participating in therapies. Afebrile. Lower extremity weakness persists but improving. Vitals:    07/11/22 0709   BP: 132/63   Pulse: 89   Resp: 17   Temp: 97.7 °F (36.5 °C)   SpO2: 92%      Review of Systems   Constitutional: Negative for fever. HENT: Negative for hearing loss and trouble swallowing. Eyes: Negative for visual disturbance. Respiratory: Negative for cough, chest tightness, shortness of breath and wheezing. Cardiovascular: Negative for chest pain, palpitations and leg swelling. Gastrointestinal: Negative for abdominal distention, abdominal pain, nausea and vomiting. Genitourinary: Negative for difficulty urinating. Musculoskeletal: Positive for back pain, gait problem and neck pain. Skin: Negative for color change and rash. Neurological: Positive for weakness. Negative for dizziness, tremors, seizures, syncope, facial asymmetry, speech difficulty, light-headedness, numbness and headaches. Psychiatric/Behavioral: Negative for agitation, confusion and hallucinations. The patient is not nervous/anxious. Physical Exam  Vitals and nursing note reviewed. Constitutional:       General: She is not in acute distress. Appearance: She is not diaphoretic. HENT:      Head: Normocephalic. Eyes:      General: No visual field deficit. Extraocular Movements: Extraocular movements intact. Pupils: Pupils are equal, round, and reactive to light. Cardiovascular:      Rate and Rhythm: Normal rate and regular rhythm. Pulmonary:      Effort: Pulmonary effort is normal. No respiratory distress. Breath sounds: Normal breath sounds. Abdominal:      General: Bowel sounds are normal. There is no distension. Palpations: Abdomen is soft. Tenderness: There is no abdominal tenderness. Skin:     General: Skin is warm and dry. Neurological:      Mental Status: She is alert and oriented to person, place, and time.       Cranial Nerves: No cranial nerve deficit, dysarthria or facial asymmetry. Motor: Weakness present. No tremor, atrophy, abnormal muscle tone or seizure activity. Gait: Gait abnormal.      Deep Tendon Reflexes: Reflexes abnormal.      Reflex Scores:       Patellar reflexes are 0 on the right side and 0 on the left side. Achilles reflexes are 0 on the right side and 0 on the left side.               Medications:  Reviewed    Infusion Medications:    dextrose       Scheduled Medications:    traMADol  25 mg Oral 3 times per day    acetaminophen  500 mg Oral 3 times per day    vancomycin  1,250 mg IntraVENous Once    Vitamin D  2,000 Units Oral Dinner    cyanocobalamin  1,000 mcg IntraMUSCular Weekly    coenzyme Q10  100 mg Oral Daily    heparin (porcine)  2,000 Units IntraVENous Once    ARIPiprazole  5 mg Oral Daily    aspirin EC  81 mg Oral Daily    atorvastatin  40 mg Oral Nightly    insulin glargine  35 Units SubCUTAneous Nightly    insulin lispro  0-12 Units SubCUTAneous TID WC    insulin lispro  0-6 Units SubCUTAneous Nightly    isosorbide mononitrate  120 mg Oral Daily    lidocaine  3 patch TransDERmal Daily    magnesium oxide  400 mg Oral Daily    melatonin  10 mg Oral Nightly    miconazole   Topical BID    midodrine  5 mg Oral Once per day on Mon Wed Fri    polyethylene glycol  17 g Oral Daily    sertraline  50 mg Oral Nightly    vancomycin (VANCOCIN) intermittent dosing (placeholder)   Other RX Placeholder     PRN Meds: trimethobenzamide, melatonin, acetaminophen **OR** acetaminophen, albuterol, camphor-menthol-methyl salicylate, dextrose, dextrose bolus **OR** dextrose bolus, glucagon (rDNA), glucose, hydrOXYzine HCl, polyethylene glycol    Labs:   Recent Labs     07/09/22  0527 07/10/22  0443   WBC 9.6 9.4   HGB 9.8* 9.7*   HCT 27.0* 26.9*    278     Recent Labs     07/09/22 0527 07/09/22  1953   * 132*   K 4.6 4.7   CL 92* 91*   CO2 27 26   BUN 18 25*   CREATININE 2.79* 3.43*   CALCIUM 10.0* 9.8   PHOS 3.3 3.4     No results for input(s): AST, ALT, BILIDIR, BILITOT, ALKPHOS in the last 72 hours. No results for input(s): INR in the last 72 hours. No results for input(s): Reyna Jovel in the last 72 hours. Urinalysis:   Lab Results   Component Value Date/Time    NITRU Negative 07/10/2022 04:51 AM    WBCUA 0-2 07/10/2022 04:51 AM    BACTERIA Negative 07/10/2022 04:51 AM    RBCUA 3-5 07/10/2022 04:51 AM    BLOODU Negative 07/10/2022 04:51 AM    SPECGRAV 1.009 07/10/2022 04:51 AM    GLUCOSEU 100 07/10/2022 04:51 AM       Radiology:   Most recent    EEG No valid procedures specified. MRI of Brain No results found for this or any previous visit. Results for orders placed during the hospital encounter of 06/30/22    MRI BRAIN WO CONTRAST    Narrative  EXAM: MRI of the brain without contrast    History: Stroke. Weakness. Technique: Multiplanar multisequence MRI of the brain was performed without contrast.    Comparison: MRI of the brain October 9, 2017. CT of the brain June 30, 2022    Findings:    Foci of white matter signal abnormality within the supratentorial white matter are nonspecific but most compatible with chronic small vessel ischemic changes in a patient of this age. Prominence of the sulci and ventricles compatible with mild generalized parenchymal volume loss. No acute hemorrhage, mass, mass effect, midline shift, or abnormal extra-axial fluid collection. Midline structures are within normal limits. The posterior  fossa is within normal limits. There is no diffusion restriction. No susceptibility artifact is identified on the gradient echo sequence. The major intracranial vascular flow voids are maintained. Cranial nerves 7/8 complexes appear grossly unremarkable. Mild paranasal sinus mucosal thickening. Fluid is present within the bilateral mastoid air cells compatible with nonspecific mastoid air  cell effusions. Impression  No acute ischemia or acute intracranial process.     Generalized parenchymal volume loss and nonspecific white matter findings most compatible with chronic small vessel ischemic changes in a patient of this age. MRA of the Head and Neck: No results found for this or any previous visit. No results found for this or any previous visit. No results found for this or any previous visit. CT of the Head: Results for orders placed during the hospital encounter of 06/30/22    CT HEAD WO CONTRAST    Narrative  CT HEAD WO CONTRAST : 6/30/2022    CLINICAL HISTORY:  fall from standing last night confused at dialysis c/o back pain . COMPARISON: 10/11/2021. TECHNIQUE: Spiral unenhanced images were obtained of the head, with routine multiplanar reconstructions performed. All CT scans at this facility use dose modulation, iterative reconstruction, and/or weight based dosing when appropriate to reduce radiation dose to as low as reasonably achievable. FINDINGS:    There is no intracranial hemorrhage, mass effect, midline shift, extra-axial collection, evidence of hydrocephalus, recent ischemic infarct, or skull fracture identified. Chronic volume loss and mild mucosal thickening is again noted within the maxillary sinuses. The mastoid air cells and other visualized paranasal sinuses are essentially clear. Impression  NO ACUTE INTRACRANIAL PROCESS OR SIGNIFICANT CHANGE FROM 10/11/2021 IDENTIFIED. No results found for this or any previous visit. No results found for this or any previous visit. Carotid duplex: No results found for this or any previous visit. No results found for this or any previous visit.   Results for orders placed during the hospital encounter of 12/01/21    US CAROTID ARTERY BILATERAL    Narrative  EXAMINATION:  CAROTID DUPLEX ULTRASONOGRAPHY    CLINICAL HISTORY:  DIZZINESS AND CAROTID STENOSIS    COMPARISONS:  October 9, 2017    TECHNIQUE:  B-mode, color flow and spectral Doppler    FINDINGS:    ARTERIAL BLOOD FLOW VELOCITY    RIGHT PS    Prox CCA    88 cm/s  Mid CCA     71 cm/s  Dist CCA    71 cm/s  Prox ICA    63 cm/s  Mid ICA     126 cm/s  Dist ICA    116 cm/s  Prox ECA    93 cm/s  Prox VERT   41 cm/s    ICA/CCA     1.78    LEFT PS    Prox CCA    183 cm/s  Mid CCA     96 cm/s  Dist CCA    82 cm/s  Prox ICA    129 cm/s  Mid ICA     110 cm/s  Dist ICA    84 cm/s  Prox ECA    90 cm/s  Prox VERT   58 cm/s    ICA/CCA     1.42    Impression  MILD SCATTERED ATHEROSCLEROSIS BOTH CAROTID TREES. AREAS OF INTIMAL THICKENING. NO FLOW OBSTRUCTION NOTED. BY VELOCITIES BILATERAL ICAS 50-69% STENOSIS. BILATERAL ANTEGRADE VERTEBRAL FLOW. Echo No results found for this or any previous visit. Assessment/Plan:    Gait ataxia with falls  MRI of the brain negative for acute findings  MRI of the lumbar spine did not show significant canal stenosis. DDD noted. MRI of the cervical spine shows severe canal stenosis from C4-5 through C6-7. Neurosurgery consulted and recommending conservative treatment at this time.   Continue with therapies and we will continue to follow her clinical course      Collaborating physicians: Dr Oliva Li    Electronically signed by LORETO Cornell CNP on 7/11/2022 at 1:16 PM

## 2022-07-12 LAB
GLUCOSE BLD-MCNC: 114 MG/DL (ref 70–99)
GLUCOSE BLD-MCNC: 188 MG/DL (ref 70–99)
GLUCOSE BLD-MCNC: 215 MG/DL (ref 70–99)
PERFORMED ON: ABNORMAL
VANCOMYCIN RANDOM: 12.2 UG/ML (ref 10–40)

## 2022-07-12 PROCEDURE — 6360000002 HC RX W HCPCS: Performed by: INTERNAL MEDICINE

## 2022-07-12 PROCEDURE — 99232 SBSQ HOSP IP/OBS MODERATE 35: CPT | Performed by: INTERNAL MEDICINE

## 2022-07-12 PROCEDURE — 1180000000 HC REHAB R&B

## 2022-07-12 PROCEDURE — 97110 THERAPEUTIC EXERCISES: CPT

## 2022-07-12 PROCEDURE — 97530 THERAPEUTIC ACTIVITIES: CPT

## 2022-07-12 PROCEDURE — 6370000000 HC RX 637 (ALT 250 FOR IP): Performed by: PHYSICAL MEDICINE & REHABILITATION

## 2022-07-12 PROCEDURE — 97535 SELF CARE MNGMENT TRAINING: CPT

## 2022-07-12 PROCEDURE — 6370000000 HC RX 637 (ALT 250 FOR IP): Performed by: INTERNAL MEDICINE

## 2022-07-12 PROCEDURE — 80202 ASSAY OF VANCOMYCIN: CPT

## 2022-07-12 PROCEDURE — 36415 COLL VENOUS BLD VENIPUNCTURE: CPT

## 2022-07-12 PROCEDURE — 2580000003 HC RX 258: Performed by: INTERNAL MEDICINE

## 2022-07-12 PROCEDURE — 97116 GAIT TRAINING THERAPY: CPT

## 2022-07-12 PROCEDURE — 90935 HEMODIALYSIS ONE EVALUATION: CPT

## 2022-07-12 PROCEDURE — 99232 SBSQ HOSP IP/OBS MODERATE 35: CPT | Performed by: PHYSICAL MEDICINE & REHABILITATION

## 2022-07-12 RX ORDER — HEPARIN SODIUM 1000 [USP'U]/ML
4600 INJECTION, SOLUTION INTRAVENOUS; SUBCUTANEOUS PRN
Status: DISCONTINUED | OUTPATIENT
Start: 2022-07-12 | End: 2022-07-20 | Stop reason: HOSPADM

## 2022-07-12 RX ORDER — FUROSEMIDE 40 MG/1
40 TABLET ORAL DAILY
Status: DISCONTINUED | OUTPATIENT
Start: 2022-07-12 | End: 2022-07-20 | Stop reason: HOSPADM

## 2022-07-12 RX ADMIN — HEPARIN SODIUM 2000 UNITS: 1000 INJECTION INTRAVENOUS; SUBCUTANEOUS at 15:17

## 2022-07-12 RX ADMIN — ACETAMINOPHEN 500 MG: 500 TABLET ORAL at 06:28

## 2022-07-12 RX ADMIN — ISOSORBIDE MONONITRATE 120 MG: 60 TABLET, EXTENDED RELEASE ORAL at 09:52

## 2022-07-12 RX ADMIN — INSULIN GLARGINE 35 UNITS: 100 INJECTION, SOLUTION SUBCUTANEOUS at 21:17

## 2022-07-12 RX ADMIN — VANCOMYCIN HYDROCHLORIDE 1250 MG: 1.25 INJECTION, POWDER, LYOPHILIZED, FOR SOLUTION INTRAVENOUS at 17:30

## 2022-07-12 RX ADMIN — HEPARIN SODIUM 4600 UNITS: 1000 INJECTION INTRAVENOUS; SUBCUTANEOUS at 15:56

## 2022-07-12 RX ADMIN — ACETAMINOPHEN 500 MG: 500 TABLET ORAL at 13:20

## 2022-07-12 RX ADMIN — Medication 400 MG: at 09:52

## 2022-07-12 RX ADMIN — Medication 100 MG: at 09:52

## 2022-07-12 RX ADMIN — FUROSEMIDE 40 MG: 40 TABLET ORAL at 12:15

## 2022-07-12 RX ADMIN — ATORVASTATIN CALCIUM 40 MG: 40 TABLET, FILM COATED ORAL at 20:52

## 2022-07-12 RX ADMIN — INSULIN LISPRO 2 UNITS: 100 INJECTION, SOLUTION INTRAVENOUS; SUBCUTANEOUS at 12:15

## 2022-07-12 RX ADMIN — SERTRALINE 50 MG: 25 TABLET, FILM COATED ORAL at 20:52

## 2022-07-12 RX ADMIN — ARIPIPRAZOLE 5 MG: 5 TABLET ORAL at 09:52

## 2022-07-12 RX ADMIN — TRAMADOL HYDROCHLORIDE 25 MG: 50 TABLET, COATED ORAL at 20:52

## 2022-07-12 RX ADMIN — Medication 10 MG: at 20:52

## 2022-07-12 RX ADMIN — TRAMADOL HYDROCHLORIDE 25 MG: 50 TABLET, COATED ORAL at 06:22

## 2022-07-12 RX ADMIN — ALTEPLASE 2 MG: 2.2 INJECTION, POWDER, LYOPHILIZED, FOR SOLUTION INTRAVENOUS at 18:40

## 2022-07-12 RX ADMIN — POLYETHYLENE GLYCOL 3350 17 G: 17 POWDER, FOR SOLUTION ORAL at 21:00

## 2022-07-12 RX ADMIN — TRAMADOL HYDROCHLORIDE 25 MG: 50 TABLET, COATED ORAL at 13:21

## 2022-07-12 RX ADMIN — ASPIRIN 81 MG: 81 TABLET, COATED ORAL at 09:52

## 2022-07-12 RX ADMIN — MICONAZOLE NITRATE: 2 POWDER TOPICAL at 21:20

## 2022-07-12 RX ADMIN — INSULIN LISPRO 2 UNITS: 100 INJECTION, SOLUTION INTRAVENOUS; SUBCUTANEOUS at 21:13

## 2022-07-12 RX ADMIN — ACETAMINOPHEN 500 MG: 500 TABLET ORAL at 22:27

## 2022-07-12 RX ADMIN — ALTEPLASE 2 MG: 2.2 INJECTION, POWDER, LYOPHILIZED, FOR SOLUTION INTRAVENOUS at 18:41

## 2022-07-12 ASSESSMENT — PAIN DESCRIPTION - DESCRIPTORS
DESCRIPTORS: ACHING
DESCRIPTORS: ACHING

## 2022-07-12 ASSESSMENT — PAIN SCALES - GENERAL
PAINLEVEL_OUTOF10: 5
PAINLEVEL_OUTOF10: 7
PAINLEVEL_OUTOF10: 5
PAINLEVEL_OUTOF10: 4
PAINLEVEL_OUTOF10: 7
PAINLEVEL_OUTOF10: 4

## 2022-07-12 ASSESSMENT — PAIN DESCRIPTION - ORIENTATION
ORIENTATION: MID;LOWER
ORIENTATION: MID;LOWER

## 2022-07-12 ASSESSMENT — PAIN DESCRIPTION - LOCATION
LOCATION: BACK
LOCATION: BACK
LOCATION: BACK;KNEE

## 2022-07-12 ASSESSMENT — ENCOUNTER SYMPTOMS
GASTROINTESTINAL NEGATIVE: 1
RESPIRATORY NEGATIVE: 1

## 2022-07-12 NOTE — PROGRESS NOTES
HD today for 3.5 hours via Left chest HD cath. Poor flow. BFR decreased to 250 and Cath locked with cathflo and marked. Net UF 3000 ml. Vanco 1250 mg given during last hour of HD. All HD documentation via [facility] paper chart. Hands off to [facility].

## 2022-07-12 NOTE — PROGRESS NOTES
Physical Therapy Rehab Treatment Note  Facility/Department: [facility]  Room: Y901/L447-13       NAME: Gordon Hoffman  : 1960 (68 y.o.)  MRN: [id number]  CODE STATUS: Full Code    Date of Service: 2022     Restrictions:  Restrictions/Precautions: Fall Risk  Position Activity Restriction  Other position/activity restrictions: L Rib fractures - 10 and 11 per patient    SUBJECTIVE:   Subjective: \" I slept alright\"    Pain  Pain: 6/10 R LBP L knee pain    OBJECTIVE:   Roll Left  Assistance Level: Independent  Roll Right  Assistance Level: Independent  Sit to Supine  Assistance Level: Independent  Supine to Sit  Assistance Level: Independent  Scooting  Assistance Level: Independent    Sit to Stand  Assistance Level: Supervision  Stand to Sit  Assistance Level: Supervision  Bed To/From Chair  Assistance Level: Supervision  Car Transfer  Assistance Level: Supervision  Skilled Clinical Factors: Increased effort from low level surface fair safety    Ambulation  Surface: Level surface; Uneven surface; Carpet  Distance: 76' x 3  Activity: Within Unit  Activity Comments: Increased L knee pain with increased gait distance multiple trials to improve endurance  Assistance Level: Stand by assist;Supervision  Gait Deviations: Slow jean  Skilled Clinical Factors: Cues for upright posture and lifting gaze from floor    Stairs  Stair Height: 6''  Device: Bilateral handrails  Number of Stairs: 1 set x 5 1 set x 2  Additional Factors: Verbal cues; Non-reciprocal going up;Non-reciprocal going down  Assistance Level: Contact guard assist;Minimal assistance  Skilled Clinical Factors: Cues for proper sequencing patient fatigued post stair negotiation requires seated rest break between sets    PT Exercises  Exercise Treatment: Seated exercises:  With 2# ankle weights, Long arc quads 3 sec hold, hip flexion, side kicks, ankle pumps ball squeezes x 20 each     ASSESSMENT/PROGRESS TOWARDS GOALS:   Assessment  Assessment: Patient continues to improve gait tolerance up to 75'. Increased L knee pain with weight bearing activities. FLAQUITO Flynn notified and medicated at the end of session. Initiated stair training patient requires cues for proper sequencing and fatigues quickly requiring seated rest break. Activity Tolerance: Patient limited by endurance  Discharge Recommendations: Continue to assess pending progress    Goals:  Long Term Goals  Long term goal 1: Indep bed mobility  Long term goal 2: Indep transfers bed to chair with safest assistive device  Long term goal 3: Ambulate with rollator Indep on level and ramp surfaces >/= 50' to allow safe return home. Long term goal 4: Pt will demonstrate improved score on Ugalde balance assessment 48/56 for decreased risk for falls. PLAN OF CARE/Safety:   Safety Devices  Type of Devices:  All fall risk precautions in place    Therapy Time:   Individual   Time In 1030   Time Out 1130   Minutes 60     Minutes: 60  Transfer/Bed mobility training: 15  Gait trainin  Therapeutic ex: [address] PTA, 22 at 12:10 PM

## 2022-07-12 NOTE — PROGRESS NOTES
Subjective: The patient complains of  moderate to severe acute      partially relieved by rest, PT, OT, rest, pain medications and exacerbated by recent illness and exertion. I am concerned about patients medical complexities and current active problems and barriers to progress including hx of hep C and paranoid schitzophrenia. Carito Stands He is hoping to trial Bengay and heating pad for her achy muscles. Patient is getting nauseated when she is due for her dialysis due to toxic meds tabulate built up. She is responding to dialysis and Tigan. We also discussed her renal osteodystrophy pain and scheduling her Ultram every 8 hours she is in agreement. She does not feel that she is on the right dose of insulin and we will consult Dr. Chelly Vizcarra. Podiatry was consulted for long toenails and ichthyosis. I have adjusted her vancomycin level checks. She is for hemodialysis. Dr. Leila Cai and nephrology will work on her Lasix dose. They are balancing her renal and cardiac issues. I discussed current functional, rehabilitation, medical status with other rehabilitation providers including nursing and case management. According to recent nursing note, \"  HD on phone- RE pt to have HD this AM- explained this RN was told by Trevon Adair RN in report- Dr. Krupa Caraballo aware Rehab all HD at 1400 secondary to rehab- and he was ok with it. \".    ROS x10: The patient also complains of severely impaired mobility and activities of daily living. Otherwise no new problems with vision, hearing, nose, mouth, throat, dermal, cardiovascular, GI, , pulmonary, musculoskeletal, psychiatric or neurological. See Rehab H&P on Rehab chart dated . Vital signs:  /75   Pulse 75   Temp 97.9 °F (36.6 °C) (Oral)   Resp 12   Ht 5' 7\" (1.702 m)   Wt 195 lb (88.5 kg)   LMP  (LMP Unknown)   SpO2 97%   BMI 30.54 kg/m²   I/O:   PO/Intake:  fair PO intake, no problems observed or reported.     Bowel/Bladder:  incontinent,Purwic constipation and urinary urgency. Last BM 7/9/22. General:  Patient is well developed, adequately nourished, non-obese and     well kempt. HEENT:    PERRLA, hearing intact to loud voice, external inspection of ear     and nose benign. Inspection of lips, tongue and gums  No teeth  Musculoskeletal: No significant change in strength or tone. All joints stable. Inspection and palpation of digits and nails show no clubbing,       cyanosis or inflammatory conditions. Neuro/Psychiatric: Affect: flat. Alert and oriented to person, place and     situation. No significant change in deep tendon reflexes or     Sensation    Lungs:  Diminished, CTA-B. Respiration effort is normal at rest.   fractured right 10-11 th ribs. tunneled Vascath left upper    chest with 2 ports. Heart:   S1 = S2, RRR. No loud murmurs. Abdomen:  Soft, non-tender, no enlargement of liver or spleen. Extremities:  Trace lower extremity edema,    tenderness. dialysis fistula graft to right upper arm that is not in use  Skin:   Intact   - discolored and bruised . ruise to back of right shoulder and right knee. Small sores noted to arms x 3     Rehabilitation:  Physical therapy: FIMS:  Bed Mobility: Scooting: Stand by assistance    Transfers: Sit to Stand: Stand by assistance  Stand to sit: Stand by assistance  Bed to Chair: Stand by assistance, Ambulation  Surface: level tile  Device: Rollator  Assistance: Stand by assistance  Quality of Gait: wide JAKE  Gait Deviations: Slow Tere,Decreased step length,Decreased step height  Distance: 60ft- pt exhibiting fatigue with this distance,      FIMS:  ,  , Assessment: Pt is 57 y.o. female to hospital due to septicemia. Pt exhibits decreased strength, balance, posture and increased pain which increases pt need for assistance with mobility to decreased risk for falls at home. Continued PT is required for safe d/c home with family.     Occupational therapy: FIMS:   ,  , Assessment: Pt is a 58 y/o female who presents to [facility] for medical decline with fall. Pt lives with dtr who assists with ADLs and IADLs PTA. Pt presents with above deficits and impaired ADL status. Pt may benefit from skilled OT intervention for increased indepdnence and safety upon d/c to home    Speech therapy: FIMS:        Lab/X-ray studies reviewed, analyzed and discussed with patient and staff:   Recent Results (from the past 24 hour(s))   POCT Glucose    Collection Time: 07/11/22 11:29 AM   Result Value Ref Range    POC Glucose 235 (H) 70 - 99 mg/dl    Performed on ACCU-CHEK    POCT Glucose    Collection Time: 07/11/22  4:16 PM   Result Value Ref Range    POC Glucose 206 (H) 70 - 99 mg/dl    Performed on ACCU-CHEK    POCT Glucose    Collection Time: 07/11/22  8:32 PM   Result Value Ref Range    POC Glucose 190 (H) 70 - 99 mg/dl    Performed on ACCU-CHEK    POCT Glucose    Collection Time: 07/12/22  6:55 AM   Result Value Ref Range    POC Glucose 114 (H) 70 - 99 mg/dl    Performed on ACCU-CHEK        Previous extensive, complex labs, notes and diagnostics reviewed and analyzed. ALLERGIES:    Allergies as of 07/09/2022 - Fully Reviewed 06/30/2022   Allergen Reaction Noted    Codeine Hives 12/09/2011    Oxycontin [oxycodone hcl] Hives 06/15/2020      (please also verify by checking MAR)      Recently, I evaluated this patient for periodic reassessment of medical and functional status. The patient was discussed in detail at the treatment team meeting focusing on current medical issues, progress in therapies, social issues, psychological issues, barriers to progress and strategies to address these barriers, and discharge planning. See the hand written addendum to rehab progress note. The patient continues to be high risk for future disability and their medical and rehabilitation prognosis continue to be good and therefore, we will continue the patient's rehabilitation course as planned. The patient's tentative discharge date was set. Patient and family education was discussed. The patient was made aware of the team discussion regarding their progress. Discharge plans were discussed along with barriers to progress and strategies to address these barriers, patient encouraged to continue to discuss discharge plans with . Complex Physical Medicine & Rehab Issues Assess & Plan:   1. Severe abnormality of gait and mobility and impaired self-care and ADL's secondary to progressive weakness dt OA flareup and  Polyneuropathy . Functional and medical status reassessed regarding patients ability to participate in therapies and patient found to be able to participate in acute intensive comprehensive inpatient rehabilitation program including PT/OT to improve balance, ambulation, ADLs, and to improve the P/AROM. Therapeutic modifications regarding activities in therapies, place, amount of time per day and intensity of therapy made daily. In bed therapies or bedside therapies prn.   2. Bowel progressive constipation, and Bladder dysfunction monitoring neurogenic bladder,   Incontinence of urine:  frequent toileting, ambulate to bathroom with assistance, check post void residuals. Check for C.difficile x1 if >2 loose stools in 24 hours, continue bowel & bladder program.  Monitor bowel and bladder function. Lactinex 2 PO every AC. MOM prn, Brown Bomb prn, Glycerin suppository prn, enema prn.  3. Severe chest wall pain as well as generalized OA pain,   Chronic pain of both shoulders: reassess pain every shift and prior to and after each therapy session, give prn Tylenol and Ultram, modalities prn in therapy, Lidoderm, K-pad prn.   4. Skin healing and breakdown risk:  continue pressure relief program.  Daily skin exams and reports from nursing. Add co-Q10  5. Severe fatigue due to nutritional and hydration deficiency: Add vitamin B12 vitamin D and CoQ10 continue to monitor I&Os, calorie counts prn, dietary consult prn.   Add healthy HS snack. 6. Acute episodic insomnia with situational adjustment disorder:  consider prn low dose Ambien, monitor for day time sedation. Add HS \"Tuck In\"  7. Falls risk elevated:  patient to use call light to get nursing assistance to get up, bed and chair alarm. 8. Elevated DVT risk: progressive activities in PT, continue prophylaxis MAHAMED hose, elevation and avoid Lovenox due to renal failure. 9. Complex discharge planning:  Weekly team meeting  every Monday to re-assess progress towards goals, discuss and address social, psychological and medical comorbidities and to address difficulties they may be having progressing in therapy. Patient and family education is in progress. The patient is to follow-up with their family physician after discharge. Complex Active General Medical Issues that complicate care Assess & Plan:     1. Principal Problem:    Impaired mobility and activities of daily living dt polyneuropathy and reccent fall   Active Problems:  1. CKD (chronic kidney disease) stage 4, GFR 15-29 ml/min,   ESRD (end stage renal disease) on dialysis, Patient is getting nauseated when she is due for her dialysis due to toxic meds tabulate built up. She is responding to dialysis and Tigan. We also discussed her renal osteodystrophy pain and scheduling her Ultram every 8 hours she is in agreement. 2.   Closed T11 fracture renal osteodystrophy,   Multiple closed fractures of ribs of right side,   Chest wall contusion, left -Lidoderm, vitamin D, abdominal binder as tolerated  3. Encephalopathy acute,   Cerebral microvascular disease-limit toxic medications  4. MRSA bacteremia with Septicemia -IV vancomycin consult pharmacy for dosing monitor renal function and adjust dose. 5.   Chronic hepatitis C-eliminate toxic medications  6. Vitamin B 12 deficiency, Vitamin D insufficiency-Add high-dose vitamin D, recheck vitamin D level out after discharge,  7.  Now with low blood pressure but history of essential (primary) hypertension,   Chronic diastolic congestive heart failure-Acute rehab to monitor heart rate and rhythm with the option of telemetry and the effects of chronotropic medication with respect to increasing physical activity and exercise in PT, OT, ADLs with medication titration to lowest effective dosing. Continue blood signs every shift focusing on heart rate, rhythm and blood pressure checks with orthostatic checks-monitoring the effect of exercise, therapy and posture. Consult hospitalist for backup medical and adjust/add medications (aspirin, Imdur, Lipitor, ProAmatine). Monitor heart rate and blood pressure as well as medications effects on vital signs before during and after therapy with especial focus on preventing orthostasis and falls risk. 8.   Controlled type 2 diabetes mellitus with diabetic neuropathy, with long-term current use of insulin-Continue blood sugar checks every shift, diet, add diabetic add dietary education, restrict carbohydrates to lowest effective and safe carb count per meal advising 4 carbs per meal, add at bedtime snack to prevent a.m. hypoglycemia, adjust/add medications (Lantus, sliding scale insulin)   9. Pulmonary hypertension -Acute rehab for endurance traing with Pulse Ox to monitoring oxygen saturation and heart rate with O2 titration to lowest effective dose. Pulse oximeter checks to shift and at HS to dose and titrate oxygen and aerosol treatments monitor for nocturnal hypoxemia, monitor vital signs, oxygen prn. Focus on energy conservation. 10.   Paranoid schizophrenia -emotional support provided daily, vitamin B12, encourage participation in rehabilitation support group and recreational therapy, adjust/add medications -patient had been on Zoloft at home. Focus of today's plan-  Initiate and modify therapuetic plan to meet patients individual needs, add rest breaks as needed  -consider resuming SSRI.  Patient is getting nauseated when she is due for her dialysis due to toxic meds tabulate built up. She is for hemodialysis. Dr. Kenan Zendejas and nephrology will work on her Lasix dose. They are balancing her renal and cardiac issues.       Ophelia Carranza D.O., PM&R     Attending    [address] [place], [place] [address]

## 2022-07-12 NOTE — PLAN OF CARE
Problem: Discharge Planning  Goal: Discharge to home or other facility with appropriate resources  7/12/2022 1408 by Radha Higgins RN  Outcome: Progressing  7/12/2022 0315 by Kurt Gee RN  Outcome: Progressing  Flowsheets (Taken 7/11/2022 2200)  Discharge to home or other facility with appropriate resources: Identify barriers to discharge with patient and caregiver     Problem: Safety - Adult  Goal: Free from fall injury  7/12/2022 1408 by Radha Higgins RN  Outcome: Progressing  Flowsheets (Taken 7/12/2022 1405)  Free From Fall Injury: Based on caregiver fall risk screen, instruct family/caregiver to ask for assistance with transferring infant if caregiver noted to have fall risk factors  7/12/2022 0315 by Kurt Gee RN  Outcome: Progressing     Problem: Skin/Tissue Integrity  Goal: Absence of new skin breakdown  Description: 1. Monitor for areas of redness and/or skin breakdown  2. Assess vascular access sites hourly  3. Every 4-6 hours minimum:  Change oxygen saturation probe site  4. Every 4-6 hours:  If on nasal continuous positive airway pressure, respiratory therapy assess nares and determine need for appliance change or resting period.   7/12/2022 1408 by Radha Higgins RN  Outcome: Progressing  7/12/2022 0315 by Kurt Gee RN  Outcome: Progressing     Problem: ABCDS Injury Assessment  Goal: Absence of physical injury  7/12/2022 1408 by Radha Higgins RN  Outcome: Progressing  Flowsheets  Taken 7/12/2022 1405 by Radha Higgins RN  Absence of Physical Injury: Implement safety measures based on patient assessment  Taken 7/12/2022 0318 by Kurt Gee RN  Absence of Physical Injury: Implement safety measures based on patient assessment  7/12/2022 0315 by Kurt Gee RN  Outcome: Progressing     Problem: Nutrition Deficit:  Goal: Optimize nutritional status  7/12/2022 1408 by Radha Higgins RN  Outcome: Progressing  7/12/2022 0315 by Kurt Gee RN  Outcome: Progressing Problem: Chronic Conditions and Co-morbidities  Goal: Patient's chronic conditions and co-morbidity symptoms are monitored and maintained or improved  7/12/2022 1408 by Didier Zuniga RN  Outcome: Progressing  7/12/2022 0315 by Aracelis Rodríguez RN  Outcome: Progressing

## 2022-07-12 NOTE — PROGRESS NOTES
HD on phone- RE pt to have HD this AM- explained this RN was told by Alise Garcia RN in report- Dr. Shai Aguiar aware Rehab all HD at 1400 secondary to rehab- and he was ok with it.

## 2022-07-12 NOTE — PROGRESS NOTES
successful at home-pt agreed  Tub/Shower Transfers  Skilled Clinical Factors: sponge bath today d/t dialysis port-R chest         Functional Mobility  Device: 4-Wheeled walker  Activity: To/From bathroom  Assistance Level: Contact guard assist  Supine to Sit  Assistance Level: Stand by assist  Scooting  Assistance Level: Supervision  Sit to Stand  Assistance Level: Stand by assist  Stand to Sit  Assistance Level: Stand by assist     Pt completed self initiated IADL to complete clothing placement on hangars while standing. Pt requested seat, but waited until the last minute and \"plopped\" into chair. Pt completed hanging up clothes on hangars while seated with Supervision. Education: educated/training on being closer to walker when using, taking time/pacing self slower d/t unsteadiness to decrease risk of falls. Equipment recommendations: sock aide         ASSESSMENT:  Assessment: verbal cues required for problem solving and safety  Activity Tolerance: Patient limited by endurance      PLAN OF CARE:  Strengthening,Balance training,Functional mobility training,Endurance training,Safety education & training,Patient/Caregiver education & training,Equipment evaluation, education, & procurement,Self-Care / ADL,Home management training  continue with current POC until discharge of 22    Patient goals : work on strengthening my arms and legs, and improve balance so I don't fall           Therapy Time:   Individual Group Co-Treat   Time In 0830       Time Out 0930         Minutes 60                   ADL/IADL trainin minutes     Electronically signed by:     MARY Villanueva,   2022, 9:17 AM

## 2022-07-12 NOTE — PROGRESS NOTES
Further inquired/clarified about Dr. Indra Tomas order for early HD as pt is scheduled for therapy and is not experiencing S/S of distress via perfect serve. Electronically signed by Sena Prakash RN on 7/12/2022 at 11:31 AM    Dr. Aryan Maldonado called back and pt can wait until 2pm for her HD.  Electronically signed by Sena Prakash RN on 7/12/2022 at 11:53 AM

## 2022-07-12 NOTE — CONSULTS
PODIATRIC MEDICINE AND SURGERY  CONSULT HISTORY AND PHYSICAL    Consulting Service:  Medicine  Requesting Provider: Charlene Hayden DO  Opinion/advice regarding: Nail care  Staff Doctor:  Judy Oconnor    ASSESSMENT: 57 y.o. female with PMH significant for CAD s/p PCA with stent, CKD4, DMT2, COPD, GERD, HTN/HLD, and Hep C. Admitted on 6/30/22 after sustaining a mechanical fall at home. On initial exam, patient is resting comfortably in bed with shoes on. Pedal pulses are palpable bilateral. Prior R third digital amputation noted. No open wounds or lesions present. No edema. Nails 1-10 appear thickened and elongated. Webspaces are dry without debris. No calluses. No further acute pedal issues appreciated. PLAN AND RECOMMENDATIONS[de-identified]  - Patient's case to be discussed with staff, Dr. Sandra Willis, who will provide final recommendations going forward  - Nails 1-10 debrided in length and thickness without issue  - Patient can follow up with Podiatry in the outpatient setting for routine nail care  - Podiatry will sign off at this time. Thank you for the consult. HPI: This 57y.o. year old female was seen today for routine nail care. Patient states she typically sees Dr. Carlos Cuello in the outpatient setting for routine foot exams. She has a history of previous R third digit amputation, done by Dr. Chandler Dorado. She states she has not had any issues with her feet since her last exam. Does admit to tenderness to the toenails when they are elongated. No other pedal complaints. Otherwise doing well with no further pedal issues to address at this time.      Past Medical History:   Diagnosis Date    Angina at rest [facility]) 5/24/2020    Anxiety     CAD S/P percutaneous coronary angioplasty 2015, 2018    stents per dr Belen Willett    CHF (congestive heart failure) ([address]     CKD (chronic kidney disease) stage 4, GFR 15-29 ml/min ([address] 2/24/2018    CKD stage 4 due to type 2 diabetes mellitus ([address]     Contusion of right chest wall 2021    COPD (chronic obstructive pulmonary disease) (HCC)     Diabetic nephropathy with proteinuria ([address] 2014    DJD (degenerative joint disease) of knee     Dr Tory Talavera GERD (gastroesophageal reflux disease)     Hemiparesis, left ([address] 2013    entered Assisted Living ([place])    Hemodialysis patient [facility])     Hemodialysis-associated hypotension 10/22/2021    History of heart failure     History of seizures     History of type C viral hepatitis     HTN (hypertension)     Hyperlipidemia     Impaired mobility and activities of daily living     Mediastinal lymphadenopathy     Justino Terrazas [place]    Metabolic syndrome     Moderate persistent asthma without complication     Need for extended care facility 2021    Neurogenic urinary incontinence 2013    Neuropathy in diabetes [facility])     Nonrheumatic mitral valve regurgitation 2021    Nonrheumatic tricuspid valve regurgitation 2021    Obesity (BMI 30-39. 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 Pascal Kocher      x1 Dr. Alexandra Daley, Dr Topher Bobby  08/10/2021    [facility]   [address] CATH LAB PROCEDURE 10/02/2019    DIALYSIS CATHETER INSERTION Left 06/03/2022    Tunneled Symetrex 15.5F x 23cm hemodialysis catheter inserted by Dr. Roxy Grey, TOTAL ABDOMINAL (CERVIX REMOVED)      one ovary intact, Dr Cassie Nathan, menorrhagia    IR THROMBECTOMY PERCUT AVF  06/06/2022    IR THROMBECTOMY PERCUT AVF 6/6/2022 [facility] PROCED[facility]    IR TUNNELED CATHETER PLACEMENT GREATER THAN 5 YEARS  06/03/2022    IR TUNNELED CATHETER PLACEMENT GREATER THAN 5 YEARS 6/3/2022 [facility]ED[facility]    IR TUNNELED CATHETER PLACEMENT GREATER THAN 5 YEARS Left 07/07/2022    15.5 fr by 23 cm Symetrex dialysis catheter inserted by Dr King Freeman IR TUNNELED [address] 5 YEARS  7/7/2022    IR TUNNELED CATHETER PLACEMENT GREATER THAN 5 YEARS 7/7/2022 [facility] PROCEDURE    TX TOTAL KNEE ARTHROPLASTY Left 06/21/2018    LEFT KNEE TOTAL KNEE ARTHROPLASTY, SHAYNA, NERVE BLOCK performed by Buster Massey MD at [address] [place] [address] [facility]      TOE AMPUTATION Right     TOTAL KNEE ARTHROPLASTY  05/19/2016    Dr Alirio Varela TUNNELED [address] Right 07/01/2020    tunneled HD catheter per Dr Eder Felder       No current facility-administered medications on file prior to encounter. Current Outpatient Medications on File Prior to Encounter   Medication Sig Dispense Refill    insulin aspart (NOVOLOG FLEXPEN) 100 UNIT/ML injection pen INJECT 8 TO 10 UNITS WITH EACH MEAL. 30 mL 3    ARIPiprazole (ABILIFY) 5 MG tablet TAKE 1 TABLET BY MOUTH ONCE DAILY 30 tablet 0    nitroGLYCERIN (NITROSTAT) 0.4 MG SL tablet up to max of 3 total doses.  If no relief after 1 dose, call 911. 25 tablet 3    furosemide (LASIX) 20 MG tablet Take 1 tablet by mouth 2 times daily (Patient taking differently: Take 100 mg by mouth 2 times daily ) 60 tablet 0    NYAMYC 221286 UNIT/GM powder APPLY TO AFFECTED AREA OF ABDOMINAL FOLDS EVERY 12 HOURS AS NEEDED 60 g 5    insulin aspart (NOVOLOG FLEXPEN) 100 UNIT/ML injection pen 15 UNITS PLUS SLIDING SCALE   LESS THAN 180 NONE  181-250 2 UNITS  251-300 4 UNITS  301-400 6 UNITS   401-500 10 UNITS 10 pen 3    isosorbide mononitrate (IMDUR) 30 MG extended release tablet Take 4 tablets by mouth daily 30 tablet 3    atorvastatin (LIPITOR) 40 MG tablet TAKE 1 TABLET BY MOUTH DAILY (Patient taking differently: Take 40 mg by mouth nightly ) 30 tablet 12    magnesium oxide (MAG-OX) 400 MG tablet TAKE 1 TABLET BY MOUTH DAILY 30 tablet 12    melatonin 10 MG CAPS capsule Take 1 capsule by mouth nightly 30 capsule 12    midodrine (PROAMATINE) 5 MG tablet Take 1 tablet by mouth one time a day every Tue, Thu, Sat for hypotension. Give 30 mins prior to dialysis. 90 tablet 3    Handicap Placard MISC by Does not apply route Expiration in 5 years. 1 each 0    sertraline (ZOLOFT) 50 MG tablet TAKE 1 TABLET BY MOUTH DAILY (Patient taking differently: Take 50 mg by mouth nightly ) 30 tablet 2    lidocaine-prilocaine (EMLA) 2.5-2.5 % cream Apply topically as needed for Pain Apply topically as needed.  3 times a week before dialysis wrap with saran wrap      albuterol (PROVENTIL) (2.5 MG/3ML) 0.083% nebulizer solution Take 3 mLs by nebulization every 4 hours as needed for Wheezing 120 each 3    hydrOXYzine (ATARAX) 25 MG tablet TAKE ONE TABLET BY MOUTH TWO TIMES A DAY      insulin glargine (LANTUS SOLOSTAR) 100 UNIT/ML injection pen 70 UNITS AT BEDTIME 30 pen 3    blood glucose test strips (ONETOUCH VERIO) strip  each 3    OneTouch Delica Lancets 91M MISC  each 3    Blood Glucose Monitoring Suppl (ONETOUCH VERIO) w/Device KIT AS DIRECTED 1 kit 0    Insulin Pen Needle (SURE COMFORT PEN NEEDLES) 30G X 8 MM MISC USE AS DIRECTED FIVE TIMES A DAILY 100 each 10    b complex-C-folic acid (NEPHROCAPS) 1 MG capsule Take 1 capsule by mouth daily      LANTUS SOLOSTAR 100 UNIT/ML injection pen 55 units at bedtime 10 pen 10    metoprolol tartrate (LOPRESSOR) 25 MG tablet TAKE 1/2 TABLET BY MOUTH TWO TIMES DAILY  (Patient taking differently: Take 12.5 mg by mouth 2 times daily ) 90 tablet 4    aspirin EC 81 MG EC tablet Take 1 tablet by mouth daily 30 tablet 12    blood glucose test strips (FREESTYLE LITE) strip 1 each by Does not apply route 4 times daily (before meals and nightly) As needed.  200 strip 5    Alcohol Swabs (EASY TOUCH ALCOHOL PREP MEDIUM) 70 % PADS USE AS DIRECTED THREE TIMES A  each 3    FreeStyle Lancets MISC Test 4x daily 150 each 3    acetaminophen (TYLENOL) 325 MG tablet Take 2 tablets by mouth every 4 hours as needed for Pain (For mild to moderate pain (Pain 1-6 out of 10 on pain scale)) 120 tablet 0    Blood Glucose Monitoring Suppl (FREESTYLE LITE) CORETTA 1 Device by Does not apply route daily as needed (Diabetes) Use freestyle meter to test blood sugar as needed 1 Device 0    nitroGLYCERIN (NITROSTAT) 0.4 MG SL tablet Place 1 tablet under the tongue every 5 minutes as needed for Chest pain         Allergies   Allergen Reactions    Codeine Hives     hives    Oxycontin [Oxycodone Hcl] Hives       Family History   Problem Relation Age of Onset    Cancer Mother 76        survived   [de-identified] Breast Cancer Mother     Hypertension Father     Diabetes Sister     Mental Illness Sister     Colon Cancer Neg Hx        Social History     Socioeconomic History    Marital status:      Spouse name: Not on file    Number of children: 2    Years of education: Not on file    Highest education level: Not on file   Occupational History    Occupation: disabled   Tobacco Use    Smoking status: Never Smoker    Smokeless tobacco: Never Used   Vaping Use    Vaping Use: Never used   Substance and Sexual Activity    Alcohol use: No     Alcohol/week: 0.0 standard drinks    Drug use: No    Sexual activity: Not Currently   Other Topics Concern    Not on file   Social History Narrative    Disabled dt depression and low back pain, [place] [facility] [place]    Dtr Ani Rodríguez is close by and is  Attends [employer] Services: Not on file    Active Member of Clubs or Organizations: Not on file    Attends Club or Organization Meetings: Not on file    Marital Status: Not on file   Intimate Partner Violence:     Fear of Current or Ex-Partner: Not on file    Emotionally Abused: Not on file    Physically Abused: Not on file    Sexually Abused: Not on file   Housing Stability:     Unable to Pay for Housing in the Last Year: Not on file    Number of [place] in the Last Year: Not on file    Unstable Housing in the Last Year: Not on file       Review of Systems  CONSTITUTIONAL: No fevers, chills, diaphoresis  HEENT: No epistaxis, tinnitus  EYES: No diplopia, blurry vision. CARDIOVASCULAR:  No chest pain, palpitations, lower extremity edema  PULM: No dyspnea, tachypnea, wheezing  GI: No nausea, vomiting, constipation, diarrhea  : No dysuria, gross hematuria, or pyuria  NEURO:  No new balance problems, peripheral weakness, paresthesias, numbness  MSK: No pain  PSY: No concerns regarding depression, anxiety  INTEGUMENTARY: No open skin lesions    OBJECTIVE:  /75   Pulse 75   Temp 97.9 °F (36.6 °C) (Oral)   Resp 12   Ht 5' 7\" (1.702 m)   Wt 195 lb (88.5 kg)   LMP  (LMP Unknown)   SpO2 97%   BMI 30.54 kg/m²     Patient is alert and oriented to person, place, and time. Resting comfortably in bed on exam. NAD.     Vascular:   DP and PT pulses are palpable B/L  Capillary Fill time < 3 seconds to B/L digits  Skin temperature warm to warm tibial tuberosity to the digits B/L  Hair growth absent to digits  No edema  Mild varicosities     Neurological:   Sensation to light touch intact B/L  Protective sensation via monofilament testing impaired B/L    Musculoskeletal/Orthopaedic:   Structural Deformities: R third digit amputation  5/5 muscle strength Dorsiflexion, Plantarflexion, Inversion, Eversion B/L    Dermatological:   Skin appears well hydrated and supple with good temperature, texture, turgor  No hyperkeratotic lesions noted  Nails 1-5 B/L appear thickened and elongated  Interspaces 1-4 B/L are clear and without debris  No open lesions noted B/L      LABS:   Lab Results   Component Value Date    WBC 9.4 07/10/2022    HGB 9.7 (L) 07/10/2022    HCT 26.9 (L) 07/10/2022    MCV 90.6 07/10/2022     07/10/2022     Lab Results   Component Value Date/Time     07/09/2022 07:53 PM    K 4.7 07/09/2022 07:53 PM    K 4.3 07/04/2022 03:07 AM    CL 91 07/09/2022 07:53 PM    CO2 26 07/09/2022 07:53 PM    BUN 25 07/09/2022 07:53 PM    CREATININE 3.43 07/09/2022 07:53 PM    GLUCOSE 224 07/09/2022 07:53 PM    GLUCOSE 419 07/30/2021 08:16 AM    CALCIUM 9.8 07/09/2022 07:53 PM      Lab Results   Component Value Date    LABPROT 3.4 (H) 10/03/2019    LABPROT 3.4 10/03/2019    LABALBU 3.9 07/09/2022     Lab Results   Component Value Date    SEDRATE 48 (H) 07/01/2022     Lab Results   Component Value Date    CRP 26.8 (H) 06/30/2022     Lab Results   Component Value Date    LABA1C 6.7 04/13/2022             Viet Alcala DPM, PGY-2  Podiatric Surgery Resident  Podiatry On Call Pager: [phone]  July 12, 2022  1:56 PM

## 2022-07-12 NOTE — PROGRESS NOTES
[facility] 
 
 
 [address] [facility] [address] [phone] Patient: Justyna Redd Sr. MRN: [id number] Visit Information Date & Time Provider Department Dept. Phone Encounter #  
 6/28/2018  2:10 PM Daryle Harm, 3 [facility] [phone] [id number] Your Appointments 8/10/2018 10:00 AM  
Follow Up with Daryle Harm, MD  
3 [address]) Appt Note: Return in 3 months for 20 min f/u  
 [address] [facility] [address]  
[phone]  
  
   
 [address] Upcoming Health Maintenance Date Due  
 EYE EXAM RETINAL OR DILATED Q1 6/5/2018 Influenza Age 5 to Adult 8/1/2018 FOOT EXAM Q1 11/10/2018 MICROALBUMIN Q1 11/10/2018 HEMOGLOBIN A1C Q6M 11/11/2018 LIPID PANEL Q1 2/9/2019 MEDICARE YEARLY EXAM 2/10/2019 GLAUCOMA SCREENING Q2Y 6/5/2019 COLONOSCOPY 4/6/2022 DTaP/Tdap/Td series (3 - Td) 4/20/2025 Allergies as of 6/28/2018  Review Complete On: 6/28/2018 By: Daryle Harm, MD  
  
 Severity Noted Reaction Type Reactions Statins-hmg-coa Reductase Inhibitors Medium 04/20/2015    Myalgia Current Immunizations  Reviewed on 4/20/2015 Name Date Influenza Vaccine 9/8/2016 10:01 AM, 11/23/2015 10:01 AM, 9/29/2014, 10/16/2013 12:00 AM, 10/18/2012 Influenza Vaccine (Quad) PF 9/6/2017 10:16 AM  
 Pneumococcal Conjugate (PCV-13) 11/17/2016 Pneumococcal Polysaccharide (PPSV-23) 5/11/2018 10:05 AM, 8/11/2009 12:00 AM  
 TB Skin Test (PPD) Intradermal 2/19/2013 Tdap 4/20/2015 10:12 AM, 2/19/2009 12:00 AM  
 Zoster Vaccine, Live 10/17/2016 Not reviewed this visit Vitals BP Pulse Temp Resp Height(growth percentile) Weight(growth percentile)  103/81 (BP 1 Location: Right arm, BP Patient Position: Sitting) 99 97.2 Patient back to Room at 1925 from HD. Told in report pt had 3L off-Vs stable. This RN just assessed and pt was eating dinner. BS was not taken, will wait till 2100. Pt c/o pain lower back and l knee. Will check med orders. °F (36.2 °C) (Oral) 19 5' 9\" (1.753 m) 183 lb 3.2 oz (83.1 kg) SpO2 BMI Smoking Status 100% 27.05 kg/m2 Former Smoker BMI and BSA Data Body Mass Index Body Surface Area  
 27.05 kg/m 2 2.01 m 2 Preferred Pharmacy Pharmacy Name Phone [facility] [address] [place] [address] [phone] Your Updated Medication List  
  
   
This list is accurate as of 6/28/18  2:32 PM.  Always use your most recent med list.  
  
  
  
  
 ACCU-CHEK SMARTVIEW CONTRL SOL Soln Generic drug:  Blood Glucose Control, Normal  
USE AS DIRECTED  
  
 acetaminophen 500 mg tablet Commonly known as:  TYLENOL  
500 mg as needed. aspirin 81 mg tablet Take 1 Tab by mouth daily. Blood-Glucose Meter Misc Commonly known as:  ACCU-CHEK IDANIA  
1 Each by Does Not Apply route daily. ergocalciferol 50,000 unit capsule Commonly known as:  ERGOCALCIFEROL Take 1 Cap by mouth every seven (7) days. fluticasone 50 mcg/actuation nasal spray Commonly known as:  Fili Fields 2 Sprays by Both Nostrils route daily. gabapentin 100 mg capsule Commonly known as:  NEURONTIN Take 2 Caps by mouth nightly. glucose blood VI test strips strip Commonly known as:  ACCU-CHEK SMARTVIEW TEST STRIP  
CHECK BLOOD SUGAR TWICE DAILY. Lancets Misc Commonly known as:  ACCU-CHEK SOFTCLIX LANCETS Test Blood sugar twice daily  
  
 leflunomide 20 mg tablet Commonly known as:  Chrisandra Corn Take 1 Tab by mouth daily. loratadine 10 mg Cap Take  by mouth.  
  
 losartan-hydroCHLOROthiazide 50-12.5 mg per tablet Commonly known as:  HYZAAR Take 1 Tab by mouth daily. metFORMIN 1,000 mg tablet Commonly known as:  GLUCOPHAGE  
TAKE 1 TABLET TWICE DAILY WITH MEALS  
  
 red yeast rice extract 600 mg Cap Take 1,800 mg by mouth daily. traMADol 50 mg tablet Commonly known as:  Kaleta Revels  
 TAKE 1 TABLET EVERY 8 HOURS AS NEEDED FOR PAIN  ( MAXIMUM DAILY AMOUNT:  150MG  ) Patient Instructions Hernia: Care Instructions Your Care Instructions A hernia develops when tissue bulges through a weak spot in the wall of your belly. The groin area and the navel are common areas for a hernia. A hernia can also develop near the area of a surgery you had before. Pressure from lifting, straining, or coughing can tear the weak area, causing the hernia to bulge and be painful. If you cannot push a hernia back into place, the tissue may become trapped outside the belly wall. If the hernia gets twisted and loses its blood supply, it will swell and die. This is called a strangulated hernia. It usually causes a lot of pain. It needs treatment right away. Some hernias need to be repaired to prevent a strangulated hernia. If your hernia causes symptoms or is large, you may need surgery. Follow-up care is a key part of your treatment and safety. Be sure to make and go to all appointments, and call your doctor if you are having problems. It's also a good idea to know your test results and keep a list of the medicines you take. How can you care for yourself at home? · Take care when lifting heavy objects. · Stay at a healthy weight. · Do not smoke. Smoking can cause coughing, which can cause your hernia to bulge. If you need help quitting, talk to your doctor about stop-smoking programs and medicines. These can increase your chances of quitting for good. · Talk with your doctor before wearing a corset or truss for a hernia. These devices are not recommended for treating hernias and sometimes can do more harm than good. There may be certain situations when your doctor thinks a truss would work, but these are rare. When should you call for help? Call your doctor now or seek immediate medical care if: 
? · You have new or worse belly pain. ? · You are vomiting. ? · You cannot pass stools or gas. ? · You cannot push the hernia back into place with gentle pressure when you are lying down. ? · The area over the hernia turns red or becomes tender. ? Watch closely for changes in your health, and be sure to contact your doctor if you have any problems. Where can you learn more? Go to http://nikita-hieu.info/. Enter C129 in the search box to learn more about \"Hernia: Care Instructions. \" Current as of: May 12, 2017 Content Version: 11.4 © 3598-5174 [employer]. Care instructions adapted under license by [employer] (which disclaims liability or warranty for this information). If you have questions about a medical condition or this instruction, always ask your healthcare professional. [address] any warranty or liability for your use of this information. Introducing [facility]! [facility] introduces [employer] patient portal. Now you can access parts of your medical record, email your doctor's office, and request medication refills online. 1. In your internet browser, go to [email or web address] 2. Click on the First Time User? Click Here link in the Sign In box. You will see the New Member Sign Up page. 3. Enter your Na[employer] Access Code exactly as it appears below. You will not need to use this code after youve completed the sign-up process. If you do not sign up before the expiration date, you must request a new code. · [employer] Access Code: [email or web address] Expires: 8/9/2018 10:25 AM 
 
4. Enter the last four digits of your Social Security Number (xxxx) and Date of Birth (mm/dd/yyyy) as indicated and click Submit. You will be taken to the next sign-up page. 5. Create a Na[employer]jie ID. This will be your [employer]ie login ID and cannot be changed, so think of one that is secure and easy to remember. 6. Create a [employer] password. You can change your password at any time. 7. Enter your Password Reset Question and Answer. This can be used at a later time if you forget your password. 8. Enter your e-mail address. You will receive e-mail notification when new information is available in [address] 9. Click Sign Up. You can now view and download portions of your medical record. 10. Click the Download Summary menu link to download a portable copy of your medical information. If you have questions, please visit the Frequently Asked Questions section of the [employer] website. Remember, [employer] is NOT to be used for urgent needs. For medical emergencies, dial 911. Now available from your iPhone and Android! Please provide this summary of care documentation to your next provider. Your primary care clinician is listed as [facility]. If you have any questions after today's visit, please call [phone].

## 2022-07-12 NOTE — PROGRESS NOTES
Pt slept all hs till just woke up - stating lab will be in- explained to pt vanco level will be drawn before HD at approx 1300 per pharmacy . Pt denies any further needs. Call light and bedside table with in reach.

## 2022-07-12 NOTE — PROGRESS NOTES
Infectious Disease     Patient Name: Jatin Bates  Date: 7/12/2022  YOB: 1960  Medical Record Number: [id number]        Sepsis with Enterococcus  Infected dialysis cath     mitral valve regurgitation  coronary artery bypass graft x1 vessel with tricuspid valve repair on 1/21/2022      Blood cultures from admission 2 sets growing gram-positive cocci in chains  Identified as Enterococcus faecalis by PCR  Patient currently on vancomycin            Review of Systems   Constitutional: Negative. Negative for chills, diaphoresis, fatigue and fever. Respiratory: Negative. Cardiovascular: Negative. Gastrointestinal: Negative. Physical Exam  Constitutional:       Appearance: She is ill-appearing. Cardiovascular:      Heart sounds: Murmur heard. Pulmonary:      Effort: Pulmonary effort is normal. No respiratory distress. Breath sounds: Normal breath sounds. No wheezing, rhonchi or rales. Abdominal:      General: Abdomen is flat. Bowel sounds are normal. There is no distension. Palpations: Abdomen is soft. There is no mass. Tenderness: There is no abdominal tenderness. There is no guarding. Blood pressure 134/75, pulse 75, temperature 97.9 °F (36.6 °C), temperature source Oral, resp. rate 12, height 5' 7\" (1.702 m), weight 195 lb (88.5 kg), SpO2 97 %, not currently breastfeeding.       .   Lab Results   Component Value Date    WBC 9.4 07/10/2022    HGB 9.7 (L) 07/10/2022    HCT 26.9 (L) 07/10/2022    MCV 90.6 07/10/2022     07/10/2022     Lab Results   Component Value Date/Time     07/09/2022 07:53 PM    K 4.7 07/09/2022 07:53 PM    K 4.3 07/04/2022 03:07 AM    CL 91 07/09/2022 07:53 PM    CO2 26 07/09/2022 07:53 PM    BUN 25 07/09/2022 07:53 PM    CREATININE 3.43 07/09/2022 07:53 PM    GLUCOSE 224 07/09/2022 07:53 PM    GLUCOSE 419 07/30/2021 08:16 AM    CALCIUM 9.8 07/09/2022 07:53 PM         7/1/22 1613   Culture Catheter Tip Culture, Catheter Tip:  NO GROWTH   Performed at [facility] [address] [address]   ([phone]        6/30/22 8413   Culture, Blood Id Sensitivity  Abnormal   Cult,Blood:  POSITIVE Blood Culture   Performed at [facility] [address]   ([phone]   P      Organism Enterococcus faecalis Abnormal  P    Resulting Agency [address] [facility]          Susceptibility      Enterococcus faecalis (2)    Antibiotic Interpretation Microscan  Method Status    ampicillin Sensitive <=2 mcg/mL BACTERIAL SUSCEPTIBILITY PANEL BY DIYA     vancomycin Sensitive 1 mcg/mL BACTERIAL SUSCEPTIBILITY PANEL BY DIYA                   7/1/22 1613   Culture Catheter Tip Culture, Catheter Tip:  NO GROWTH          ASSESSMENT:  PLAN:    Sepsis with Enterococcus  Infected dialysis cath   vancomycin

## 2022-07-12 NOTE — PROGRESS NOTES
Physical Therapy Rehab Treatment Note  Facility[facility]Department: [facility]  Room: N837/Y302-99       NAME: Mirta Santiago  : 1960 (20 y.o.)  MRN: [id number]  CODE STATUS: Full Code    Date of Service: 2022     Restrictions:  Restrictions/Precautions: Fall Risk  Position Activity Restriction  Other position/activity restrictions: L Rib fractures - 10 and 11 per patient       SUBJECTIVE:   Subjective: \" I am feeling alright\"    Pain  Pain: 6/10 R LBP L knee pain    OBJECTIVE:     Roll Left  Assistance Level: Independent  Roll Right  Assistance Level: Independent  Sit to Supine  Assistance Level: Independent  Supine to Sit  Assistance Level: Independent  Scooting  Assistance Level: Independent    Sit to Stand  Assistance Level: Supervision  Stand to Sit  Assistance Level: Supervision  Bed To/From Chair  Assistance Level: Supervision    Ambulation  Surface: Level surface; Uneven surface; Carpet  Distance: 50' x 2  Activity: Within Unit  Activity Comments: Increased L knee pain with increased gait distance multiple trials to improve endurance  Assistance Level: Stand by assist;Supervision  Gait Deviations: Slow jean  Skilled Clinical Factors: Cues for upright posture and lifting gaze from floor    PT Exercises  Exercise Treatment: Supine Exercises: Quad sets, Glute sets, Ankle pumps, Hip abduction, Straight leg raises, Bridges x 20 each     Activity Tolerance  Activity Tolerance: Patient limited by endurance    ASSESSMENT/PROGRESS TOWARDS GOALS:   Assessment  Assessment: Patient with increased L knee pain in p.m. requesting to lay down post gait training. Patient agreeable to complete supine exercises to increase LE strength for improved gait and transfers  Activity Tolerance: Patient limited by endurance  Discharge Recommendations: Continue to assess pending progress    Goals:  Long Term Goals  Long term goal 1: Indep bed mobility  Long term goal 2:  Indep transfers bed to chair with safest assistive device  Long term goal 3: Ambulate with rollator Indep on level and ramp surfaces >/= 50' to allow safe return home. Long term goal 4: Pt will demonstrate improved score on [place] balance assessment 48/56 for decreased risk for falls. PLAN OF CARE/Safety:   Safety Devices  Type of Devices:  All fall risk precautions in place      Therapy Time:   Individual   Time In 1330   Time Out 1400   Minutes 30     Minutes: 30  Transfer/Bed mobility trainin  Gait training: 10  Therapeutic ex: [address], PTA, 22 at 3:55 PM

## 2022-07-12 NOTE — PROGRESS NOTES
Nephrology Progress Note  NOELLE negative veg.   Assessment:  ESRDX  VRE septecemia   recent tricuspid valve repair CABGx  OHDx Hx CVA  DM type-2  HypertensionSchizophrenia    Plan:  Dialysis today     Patient Active Problem List:     Atherosclerotic heart disease of native coronary artery with unspecified angina pectoris (HCC)     Schizophrenia, paranoid, chronic     Metabolic syndrome     Vitamin B 12 deficiency     Cerebral microvascular disease     Mixed hyperlipidemia     Other hammer toe (acquired)     Vitamin D insufficiency     Incontinence of urine     Diabetic nephropathy with proteinuria ([address]     Essential (primary) hypertension     History of type C viral hepatitis     Urinary incontinence due to cognitive impairment     History of seizures     Stented coronary artery-plan is to stay on Plavix indefinately per Dr Leila Orosco     Other specified diabetes mellitus with diabetic neuropathy, unspecified ([address]     Controlled type 2 diabetes mellitus with diabetic neuropathy, with long-term current use of insulin (HCC)     Hemiparesis, left (HCC)     Angina, class II ([address]     Pain, unspecified     Tardive dyskinesia     Shortness of breath     Chronic diastolic congestive heart failure (HCC)     Sleep apnea, unspecified     Pulmonary hypertension, unspecified (HCC)     Class 2 severe obesity with serious comorbidity and body mass index (BMI) of 36.0 to 36.9 in [facility])     Edema     Closed supracondylar fracture of right humerus     Other chronic pain     Palliative care patient     Recurrent falls     Renal failure     Difficulty in walking     ESRD (end stage renal disease) on dialysis ([address]     Weakness     Other seizures (HCC)     Moderate persistent asthma without complication     DBMRH-14     Post PTCA     Falls frequently     Chest wall contusion, left, initial encounter     Headache, unspecified     Paranoid schizophrenia ([address]     Compression fracture of spine ([address]     Closed rib fracture     Depression Chronic obstructive pulmonary disease (HCC)     Critical illness polyneuropathy ([facility])     Multiple closed fractures of ribs of right side     Nonrheumatic mitral valve regurgitation     Nonrheumatic tricuspid valve regurgitation     Need for extended care facility     Chronic pain of both shoulders     Hemodialysis-associated hypotension     Anginal chest pain at rest [facility])     Chest pain     Unstable angina ([facility])     NSTEMI (non-ST elevated myocardial infarction) ([facility])     CKD (chronic kidney disease) stage 4, GFR 15-29 ml/min ([facility])     Hyperkalemia     Impaired mobility and activities of daily living dt polyneuropathy and reccent fall      Dialysis patient [facility])     Unspecified open wound of right upper arm, initial encounter     Multiple closed fractures of right lower extremity and ribs     Closed T11 fracture ([facility])     Encephalopathy acute     MRSA bacteremia     Sepsis due to Enterococcus ([address]     Local infection due to central venous catheter     DJD (degenerative joint disease), cervical     Closed head injury     Sarcopenia     Fall from standing     Septicemia ([address]     Chronic hepatitis C ([address]     Catheter-related bloodstream infection     Enterococcus faecalis infection     Cervical stenosis of spinal canal     Ataxic gait      Subjective:  Admit Date: 7/9/2022    Interval History: doing fine    Medications:  Scheduled Meds:   traMADol  25 mg Oral 3 times per day    acetaminophen  500 mg Oral 3 times per day    vancomycin  1,250 mg IntraVENous Once    Vitamin D  2,000 Units Oral Dinner    cyanocobalamin  1,000 mcg IntraMUSCular Weekly    coenzyme Q10  100 mg Oral Daily    heparin (porcine)  2,000 Units IntraVENous Once    ARIPiprazole  5 mg Oral Daily    aspirin EC  81 mg Oral Daily    atorvastatin  40 mg Oral Nightly    insulin glargine  35 Units SubCUTAneous Nightly    insulin lispro  0-12 Units SubCUTAneous TID WC    insulin lispro  0-6 Units SubCUTAneous Nightly    isosorbide mononitrate  120 mg Oral Daily    lidocaine  3 patch TransDERmal Daily    magnesium oxide  400 mg Oral Daily    melatonin  10 mg Oral Nightly    miconazole   Topical BID    midodrine  5 mg Oral Once per day on Mon Wed Fri    polyethylene glycol  17 g Oral Daily    sertraline  50 mg Oral Nightly    vancomycin (VANCOCIN) intermittent dosing (placeholder)   Other RX Placeholder     Continuous Infusions:   dextrose         CBC:   Recent Labs     07/10/22  0443   WBC 9.4   HGB 9.7*        CMP:    Recent Labs     07/09/22 1953   *   K 4.7   CL 91*   CO2 26   BUN 25*   CREATININE 3.43*   GLUCOSE 224*   CALCIUM 9.8   LABGLOM 13.4*     Troponin: No results for input(s): TROPONINI in the last 72 hours. BNP: No results for input(s): BNP in the last 72 hours. INR: No results for input(s): INR in the last 72 hours. Lipids: No results for input(s): CHOL, LDLDIRECT, TRIG, HDL, AMYLASE, LIPASE in the last 72 hours. Liver:   Recent Labs     07/09/22 1953   LABALBU 3.9     Iron:  No results for input(s): IRONS, FERRITIN in the last 72 hours. Invalid input(s): LABIRONS  Urinalysis: No results for input(s): UA in the last 72 hours.     Objective:  Vitals: /75   Pulse 75   Temp 97.9 °F (36.6 °C) (Oral)   Resp 12   Ht 5' 7\" (1.702 m)   Wt 195 lb (88.5 kg)   LMP  (LMP Unknown)   SpO2 97%   BMI 30.54 kg/m²    Wt Readings from Last 3 Encounters:   07/09/22 195 lb (88.5 kg)   07/08/22 189 lb 9.5 oz (86 kg)   06/22/22 217 lb (98.4 kg)      24HR INTAKE/OUTPUT:  No intake or output data in the 24 hours ending 07/12/22 0846    General: alert, in no apparent distress  HEENT: normocephalic, atraumatic, anicteric  Neck: supple, no mass  Lungs: non-labored respirations, clear to auscultation bilaterally  Heart: regular rate and rhythm, no murmurs or rubs  Abdomen: soft, non-tender, non-distended  Ext: no cyanosis, no peripheral edema  Neuro: alert and oriented, no gross abnormalities  Psych: normal mood and affect  Skin: no rash      Electronically signed by Kay Roberto DO, MD

## 2022-07-12 NOTE — PLAN OF CARE
Problem: Discharge Planning  Goal: Discharge to home or other facility with appropriate resources  7/12/2022 0315 by Tri Grace RN  Outcome: Progressing  Flowsheets (Taken 7/11/2022 2200)  Discharge to home or other facility with appropriate resources: Identify barriers to discharge with patient and caregiver  7/11/2022 1555 by Satish Milligan RN  Outcome: Progressing     Problem: Safety - Adult  Goal: Free from fall injury  7/12/2022 0315 by Tri Grace RN  Outcome: Progressing  7/11/2022 1555 by Satish Milligan RN  Outcome: Progressing     Problem: Skin/Tissue Integrity  Goal: Absence of new skin breakdown  Description: 1. Monitor for areas of redness and/or skin breakdown  2. Assess vascular access sites hourly  3. Every 4-6 hours minimum:  Change oxygen saturation probe site  4. Every 4-6 hours:  If on nasal continuous positive airway pressure, respiratory therapy assess nares and determine need for appliance change or resting period.   7/12/2022 0315 by Tri Grace RN  Outcome: Progressing  7/11/2022 1555 by Satish Milligan RN  Outcome: Progressing     Problem: ABCDS Injury Assessment  Goal: Absence of physical injury  7/12/2022 0315 by Tri Grace RN  Outcome: Progressing  7/11/2022 1555 by Satish Milligan RN  Outcome: Progressing     Problem: Nutrition Deficit:  Goal: Optimize nutritional status  7/12/2022 0315 by Tri Grace RN  Outcome: Progressing  7/11/2022 1555 by Satish Milligan RN  Outcome: Progressing     Problem: Chronic Conditions and Co-morbidities  Goal: Patient's chronic conditions and co-morbidity symptoms are monitored and maintained or improved  7/12/2022 0315 by Tri Grace RN  Outcome: Progressing  7/11/2022 1555 by Satish Milligan RN  Outcome: Progressing

## 2022-07-12 NOTE — PROGRESS NOTES
Co-Treat   Time In 1300       Time Out 1330         Minutes 30                   Therapeutic activities: 30 minutes     Electronically signed by:    Golden Campos OT,   7/12/2022, 4:12 PM

## 2022-07-13 ENCOUNTER — CARE COORDINATION (OUTPATIENT)
Dept: CARE COORDINATION | Age: 62
End: 2022-07-13

## 2022-07-13 LAB
GLUCOSE BLD-MCNC: 149 MG/DL (ref 70–99)
GLUCOSE BLD-MCNC: 159 MG/DL (ref 70–99)
GLUCOSE BLD-MCNC: 190 MG/DL (ref 70–99)
GLUCOSE BLD-MCNC: 253 MG/DL (ref 70–99)
PERFORMED ON: ABNORMAL

## 2022-07-13 PROCEDURE — 99232 SBSQ HOSP IP/OBS MODERATE 35: CPT | Performed by: NURSE PRACTITIONER

## 2022-07-13 PROCEDURE — 97530 THERAPEUTIC ACTIVITIES: CPT

## 2022-07-13 PROCEDURE — 97116 GAIT TRAINING THERAPY: CPT

## 2022-07-13 PROCEDURE — 97535 SELF CARE MNGMENT TRAINING: CPT

## 2022-07-13 PROCEDURE — 99232 SBSQ HOSP IP/OBS MODERATE 35: CPT | Performed by: PHYSICAL MEDICINE & REHABILITATION

## 2022-07-13 PROCEDURE — 2700000000 HC OXYGEN THERAPY PER DAY

## 2022-07-13 PROCEDURE — 6370000000 HC RX 637 (ALT 250 FOR IP): Performed by: INTERNAL MEDICINE

## 2022-07-13 PROCEDURE — 6370000000 HC RX 637 (ALT 250 FOR IP): Performed by: PHYSICAL MEDICINE & REHABILITATION

## 2022-07-13 PROCEDURE — 97112 NEUROMUSCULAR REEDUCATION: CPT

## 2022-07-13 PROCEDURE — 1180000000 HC REHAB R&B

## 2022-07-13 PROCEDURE — 99222 1ST HOSP IP/OBS MODERATE 55: CPT | Performed by: INTERNAL MEDICINE

## 2022-07-13 PROCEDURE — 99232 SBSQ HOSP IP/OBS MODERATE 35: CPT | Performed by: INTERNAL MEDICINE

## 2022-07-13 PROCEDURE — 92526 ORAL FUNCTION THERAPY: CPT

## 2022-07-13 PROCEDURE — 97110 THERAPEUTIC EXERCISES: CPT

## 2022-07-13 PROCEDURE — 97129 THER IVNTJ 1ST 15 MIN: CPT

## 2022-07-13 RX ORDER — INSULIN GLARGINE 100 [IU]/ML
25 INJECTION, SOLUTION SUBCUTANEOUS NIGHTLY
Status: DISCONTINUED | OUTPATIENT
Start: 2022-07-14 | End: 2022-07-20 | Stop reason: HOSPADM

## 2022-07-13 RX ADMIN — TRAMADOL HYDROCHLORIDE 25 MG: 50 TABLET, COATED ORAL at 14:34

## 2022-07-13 RX ADMIN — ISOSORBIDE MONONITRATE 120 MG: 60 TABLET, EXTENDED RELEASE ORAL at 09:04

## 2022-07-13 RX ADMIN — INSULIN LISPRO 2 UNITS: 100 INJECTION, SOLUTION INTRAVENOUS; SUBCUTANEOUS at 12:18

## 2022-07-13 RX ADMIN — ASPIRIN 81 MG: 81 TABLET, COATED ORAL at 09:03

## 2022-07-13 RX ADMIN — Medication 400 MG: at 09:03

## 2022-07-13 RX ADMIN — Medication 2000 UNITS: at 17:25

## 2022-07-13 RX ADMIN — INSULIN LISPRO 2 UNITS: 100 INJECTION, SOLUTION INTRAVENOUS; SUBCUTANEOUS at 09:04

## 2022-07-13 RX ADMIN — MICONAZOLE NITRATE: 2 POWDER TOPICAL at 21:00

## 2022-07-13 RX ADMIN — MICONAZOLE NITRATE: 2 POWDER TOPICAL at 09:15

## 2022-07-13 RX ADMIN — SERTRALINE 50 MG: 25 TABLET, FILM COATED ORAL at 21:11

## 2022-07-13 RX ADMIN — INSULIN LISPRO 1 UNITS: 100 INJECTION, SOLUTION INTRAVENOUS; SUBCUTANEOUS at 21:15

## 2022-07-13 RX ADMIN — Medication 10 MG: at 21:11

## 2022-07-13 RX ADMIN — ATORVASTATIN CALCIUM 40 MG: 40 TABLET, FILM COATED ORAL at 21:11

## 2022-07-13 RX ADMIN — Medication 100 MG: at 09:03

## 2022-07-13 RX ADMIN — Medication 2000 UNITS: at 17:29

## 2022-07-13 RX ADMIN — ACETAMINOPHEN 500 MG: 500 TABLET ORAL at 21:21

## 2022-07-13 RX ADMIN — MIDODRINE HYDROCHLORIDE 5 MG: 5 TABLET ORAL at 09:03

## 2022-07-13 RX ADMIN — INSULIN GLARGINE 35 UNITS: 100 INJECTION, SOLUTION SUBCUTANEOUS at 21:15

## 2022-07-13 RX ADMIN — INSULIN LISPRO 6 UNITS: 100 INJECTION, SOLUTION INTRAVENOUS; SUBCUTANEOUS at 17:25

## 2022-07-13 RX ADMIN — TRAMADOL HYDROCHLORIDE 25 MG: 50 TABLET, COATED ORAL at 21:22

## 2022-07-13 RX ADMIN — POLYETHYLENE GLYCOL 3350 17 G: 17 POWDER, FOR SOLUTION ORAL at 21:12

## 2022-07-13 RX ADMIN — ACETAMINOPHEN 500 MG: 500 TABLET ORAL at 05:58

## 2022-07-13 RX ADMIN — FUROSEMIDE 40 MG: 40 TABLET ORAL at 09:04

## 2022-07-13 RX ADMIN — ARIPIPRAZOLE 5 MG: 5 TABLET ORAL at 09:03

## 2022-07-13 RX ADMIN — ACETAMINOPHEN 500 MG: 500 TABLET ORAL at 14:35

## 2022-07-13 ASSESSMENT — PAIN SCALES - GENERAL
PAINLEVEL_OUTOF10: 3
PAINLEVEL_OUTOF10: 4
PAINLEVEL_OUTOF10: 5
PAINLEVEL_OUTOF10: 4

## 2022-07-13 ASSESSMENT — PAIN DESCRIPTION - LOCATION
LOCATION: SHOULDER;KNEE;BACK
LOCATION: BACK;KNEE
LOCATION: BACK;KNEE

## 2022-07-13 ASSESSMENT — PAIN DESCRIPTION - ORIENTATION
ORIENTATION: LEFT;RIGHT
ORIENTATION: MID;LOWER
ORIENTATION: LEFT

## 2022-07-13 ASSESSMENT — ENCOUNTER SYMPTOMS
RESPIRATORY NEGATIVE: 1
SHORTNESS OF BREATH: 0
ABDOMINAL DISTENTION: 0
COLOR CHANGE: 0
VOMITING: 0
WHEEZING: 0
NAUSEA: 0
GASTROINTESTINAL NEGATIVE: 1
TROUBLE SWALLOWING: 0
CHEST TIGHTNESS: 0
COUGH: 0

## 2022-07-13 ASSESSMENT — PAIN DESCRIPTION - PAIN TYPE: TYPE: CHRONIC PAIN

## 2022-07-13 ASSESSMENT — PAIN DESCRIPTION - DESCRIPTORS: DESCRIPTORS: ACHING

## 2022-07-13 ASSESSMENT — PAIN DESCRIPTION - FREQUENCY: FREQUENCY: CONTINUOUS

## 2022-07-13 NOTE — PROGRESS NOTES
Occupational Therapy  OCCUPATIONAL THERAPY  INPATIENT REHAB TREATMENT NOTE  [facility]      NAME: Afua Mas  : 1960 (34 y.o.)  MRN: [id number]  CODE STATUS: Full Code  Room: R248/R248-01    Date of Service: 2022    Referring Physician: Dr. Billy Jacobs  Rehab Diagnosis: impaired mobility and ADLs d/t polyneuropathy and recent fall    Restrictions  Restrictions/Precautions  Restrictions/Precautions: Fall Risk         Position Activity Restriction  Other position/activity restrictions: L Rib fractures - 10 and 11 per patient, dialysis port R chest, No Showering    Patient's date of birth confirmed: Yes    SAFETY:  Safety Devices  Safety Devices in place: Yes  Type of devices: All fall risk precautions in place    SUBJECTIVE:  Subjective: \" because we did a real good bath yesterday. \"    Pain at start of treatment: Yes: 5/10    Pain at end of treatment: Yes: 5/10    Location: R lower back  Nursing notified: Yes  RN: Nela Feldman  Intervention: Other: lidocaine patches    COGNITION:  Orientation  Overall Orientation Status: Within Normal Limits  Orientation Level: Oriented to place;Oriented to time;Oriented to person;Oriented to situation  Cognition  Overall Cognitive Status: Exceptions  Arousal/Alertness: Appropriate responses to stimuli  Following Commands: Follows multistep commands consistently  Attention Span: Appears intact  Memory: Appears intact  Safety Judgement: Decreased awareness of need for safety;Decreased awareness of need for assistance  Problem Solving: Assistance required to generate solutions;Assistance required to implement solutions  Insights: Decreased awareness of deficits  Initiation: Requires cues for some  Sequencing: Requires cues for some      Pt's current cognitive status is:  Comprehension: Independent  Expression: Independent  Social Interaction: Independent  Problem Solving: Mod I  Memory:  Mod I    OBJECTIVE:         Feeding  Assistance Level: Modified independent  Grooming/Oral Hygiene  Assistance Level: Supervision  Skilled Clinical Factors: pt can set herself up, needs supervison for safety  Upper Extremity Bathing  Assistance Level: Minimal assistance  Skilled Clinical Factors: needed assist for creviced areas for thoroughness under breasts and stomach folds  Lower Extremity Bathing  Assistance Level: Minimal assistance  Skilled Clinical Factors: B feet, thoroughness with buttocks, and creviced areas, perineal folds  Upper Extremity Dressing  Assistance Level: Supervision  Lower Extremity Dressing  Assistance Level: Stand by assist  Putting On/Taking Off Footwear  Equipment Provided: Sock aid  Assistance Level: Minimal assistance;Verbal cues  Skilled Clinical Factors: needs extended time to don socks with sock aide  Tub/Shower Transfers  Skilled Clinical Factors: sponge bath today d/t dialysis port-L chest         Functional Mobility  Device: 4-Wheeled walker  Activity: To/From bathroom  Supine to Sit  Assistance Level: Stand by assist  Scooting  Assistance Level: Supervision  Sit to Stand  Assistance Level: Stand by assist  Stand to Sit  Assistance Level: Stand by assist                      Education:  Education  Education Given To: Patient  Education Provided: Equipment  Education Provided Comments: Sock aid  Education Method: Verbal  Barriers to Learning: Cognition  Education Outcome: Continued education needed    Equipment recommendations: sock aide         ASSESSMENT:  Activity Tolerance: Patient limited by endurance; Patient limited by pain      PLAN OF CARE:  Strengthening,Balance training,Functional mobility training,Endurance training,Safety education & training,Patient/Caregiver education & training,Equipment evaluation, education, & procurement,Self-Care / ADL,Home management training  continue with current POC until discharge of 7-16-22    Patient goals : work on strengthening my arms and legs, and improve balance so I don't fall           Therapy Time:   Individual Group Co-Treat   Time In 08       Time Out 0930         Minutes 55                   ADL/IADL trainin minutes     Electronically signed by:     MARY Benito,   2022, 9:32 AM

## 2022-07-13 NOTE — PROGRESS NOTES
Nephrology Progress Note    Assessment:  ESRDXDM type-2  OHDX                                                       Hx CVA seizures remote  Hypertension  Anemia  Hepatitis-C history        Plan:continue Rehab     Patient Active Problem List:     Atherosclerotic heart disease of native coronary artery with unspecified angina pectoris (HCC)     Schizophrenia, paranoid, chronic     Metabolic syndrome     Vitamin B 12 deficiency     Cerebral microvascular disease     Mixed hyperlipidemia     Other hammer toe (acquired)     Vitamin D insufficiency     Incontinence of urine     Diabetic nephropathy with proteinuria ([address]     Essential (primary) hypertension     History of type C viral hepatitis     Urinary incontinence due to cognitive impairment     History of seizures     Stented coronary artery-plan is to stay on Plavix indefinately per Dr Bessie Vega     Other specified diabetes mellitus with diabetic neuropathy, unspecified ([address]     Controlled type 2 diabetes mellitus with diabetic neuropathy, with long-term current use of insulin (HCC)     Hemiparesis, left (HCC)     Angina, class II ([address]     Pain, unspecified     Tardive dyskinesia     Shortness of breath     Chronic diastolic congestive heart failure (HCC)     Sleep apnea, unspecified     Pulmonary hypertension, unspecified (HCC)     Class 2 severe obesity with serious comorbidity and body mass index (BMI) of 36.0 to 36.9 in adult [facility])     Edema     Closed supracondylar fracture of right humerus     Other chronic pain     Palliative care patient     Recurrent falls     Renal failure     Difficulty in walking     ESRD (end stage renal disease) on dialysis ([address]     Weakness     Other seizures (HCC)     Moderate persistent asthma without complication     MMNDC-67     Post PTCA     Falls frequently     Chest wall contusion, left, initial encounter     Headache, unspecified     Paranoid schizophrenia ([address]     Compression fracture of spine ([address]     Closed rib fracture Depression     Chronic obstructive pulmonary disease (HCC)     Critical illness polyneuropathy ([facility])     Multiple closed fractures of ribs of right side     Nonrheumatic mitral valve regurgitation     Nonrheumatic tricuspid valve regurgitation     Need for extended care facility     Chronic pain of both shoulders     Hemodialysis-associated hypotension     Anginal chest pain at rest [facility])     Chest pain     Unstable angina ([facility])     NSTEMI (non-ST elevated myocardial infarction) ([facility])     CKD (chronic kidney disease) stage 4, GFR 15-29 ml/min ([facility])     Hyperkalemia     Impaired mobility and activities of daily living dt polyneuropathy and reccent fall      Dialysis patient [facility])     Unspecified open wound of right upper arm, initial encounter     Multiple closed fractures of right lower extremity and ribs     Closed T11 fracture ([facility])     Encephalopathy acute     MRSA bacteremia     Sepsis due to Enterococcus ([address]     Local infection due to central venous catheter     DJD (degenerative joint disease), cervical     Closed head injury     Sarcopenia     Fall from standing     Septicemia ([address]     Chronic hepatitis C ([address]     Catheter-related bloodstream infection     Enterococcus faecalis infection     Cervical stenosis of spinal canal     Ataxic gait      Subjective:  Admit Date: 7/9/2022    Interval History: no issues  Medications:  Scheduled Meds:   furosemide  40 mg Oral Daily    traMADol  25 mg Oral 3 times per day    acetaminophen  500 mg Oral 3 times per day    Vitamin D  2,000 Units Oral Dinner    cyanocobalamin  1,000 mcg IntraMUSCular Weekly    coenzyme Q10  100 mg Oral Daily    ARIPiprazole  5 mg Oral Daily    aspirin EC  81 mg Oral Daily    atorvastatin  40 mg Oral Nightly    insulin glargine  35 Units SubCUTAneous Nightly    insulin lispro  0-12 Units SubCUTAneous TID WC    insulin lispro  0-6 Units SubCUTAneous Nightly    isosorbide mononitrate  120 mg Oral Daily    lidocaine  3 patch TransDERmal Daily    magnesium oxide  400 mg Oral Daily    melatonin  10 mg Oral Nightly    miconazole   Topical BID    midodrine  5 mg Oral Once per day on Mon Wed Fri    polyethylene glycol  17 g Oral Daily    sertraline  50 mg Oral Nightly    vancomycin (VANCOCIN) intermittent dosing (placeholder)   Other RX Placeholder     Continuous Infusions:   dextrose         CBC: No results for input(s): WBC, HGB, PLT in the last 72 hours. CMP:  No results for input(s): NA, K, CL, CO2, BUN, CREATININE, GLUCOSE, CALCIUM, LABGLOM in the last 72 hours. Troponin: No results for input(s): TROPONINI in the last 72 hours. BNP: No results for input(s): BNP in the last 72 hours. INR: No results for input(s): INR in the last 72 hours. Lipids: No results for input(s): CHOL, LDLDIRECT, TRIG, HDL, AMYLASE, LIPASE in the last 72 hours. Liver: No results for input(s): AST, ALT, ALKPHOS, PROT, LABALBU, BILITOT in the last 72 hours. Invalid input(s): BILDIR  Iron:  No results for input(s): IRONS, FERRITIN in the last 72 hours. Invalid input(s): LABIRONS  Urinalysis: No results for input(s): UA in the last 72 hours.     Objective:  Vitals: BP (!) 129/58   Pulse 78   Temp 97.9 °F (36.6 °C) (Oral)   Resp 13   Ht 5' 7\" (1.702 m)   Wt 195 lb (88.5 kg)   LMP  (LMP Unknown)   SpO2 99%   BMI 30.54 kg/m²    Wt Readings from Last 3 Encounters:   07/09/22 195 lb (88.5 kg)   07/08/22 189 lb 9.5 oz (86 kg)   06/22/22 217 lb (98.4 kg)      24HR INTAKE/OUTPUT:      Intake/Output Summary (Last 24 hours) at 7/13/2022 0857  Last data filed at 7/12/2022 2057  Gross per 24 hour   Intake 240 ml   Output --   Net 240 ml       General: alert, in no apparent distress  HEENT: normocephalic, atraumatic, anicteric  Neck: supple, no mass  Lungs: non-labored respirations, clear to auscultation bilaterally  Heart: regular rate and rhythm, no murmurs or rubs  Abdomen: soft, non-tender, non-distended  Ext: no cyanosis, no peripheral edema  Neuro: alert and oriented, no gross abnormalities  Psych: normal mood and affect  Skin: no rash      Electronically signed by Sena Salazar DO, MD

## 2022-07-13 NOTE — CARE COORDINATION
LSW met with pt and notified her of discharge date changing to 7/17 per her request due to dialysis. LSW called [facility] in [place] to discuss resuming pt's treatment schedule. LSW spoke to Burke Verdin and she confirmed pt will resume schedule of Tues, Thurs, Sat with 6:15AM start time and will end at 9:45AM. LSW then called pt's dtr and discussed pt's current level of function and goals. Dtr was pleased and when family training was discussed dtr declined as she has been a HHA for 18 years. 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 Dtr noted that pt has been in and out of rehab/SNFs/hospitals for over a year due to falls either at home or dialysis. She explained that pt always does well in rehab but when she comes home, she ends up falling. Dtr does not like [facility] and stated that pt has been going to [facility] for OP therapy for the past 6 months. Dtr stated that [facility] will need an order for resumption of care before discharge, LSW will obtain. LSW then had dtr speak with Nettie Weldon (RN Case Manager) to address nursing questions/concerns. Dtr noted that she would like pt to be off of the [facility] as she does not have one at home and she wants her to be on a toileting schedule, PEDRO (RN) was notified.  Electronically signed by STEPHENIE Brice LSW on 7/13/2022 at 11:22 AM

## 2022-07-13 NOTE — PROGRESS NOTES
Physical Therapy Rehab Treatment Note  Facility/Department: [facility]  Room: P207/I059-72       NAME: Contreras Shelley  : 1960 (55 y.o.)  MRN: [id number]  CODE STATUS: Full Code    Date of Service: 2022     Restrictions:  Restrictions/Precautions: Fall Risk  Position Activity Restriction  Other position/activity restrictions: L Rib fractures - 10 and 11 per patient    SUBJECTIVE:   Subjective: I am feeling better today    Pain  Pain: 10 R LBP L knee pain pre session 4/10 post session    OBJECTIVE:   Sit to Stand  Assistance Level: Modified independent  Stand to Sit  Assistance Level: Supervision  Skilled Clinical Factors: With fatigue patient sits quickly    Ambulation  Surface: Level surface; Uneven surface; Carpet; Ramp  Device: Rollator  Distance: 100' x 2, 50' x 1  Activity: Within Unit  Activity Comments: No increase in L knee pain with weight bearing this a.m. reciprocal pattern occasional decreased R foot clearance cues to lift feet and increase heel strike  Additional Factors: Verbal cues  Assistance Level: Supervision  Skilled Clinical Factors: 2# ankle weight added to improve foot clearance and endurance    Stairs  Stair Height: 6''  Device: Bilateral handrails  Number of Stairs: 10 (bottom step only for improved strength)  Additional Factors: Verbal cues; Non-reciprocal going up;Non-reciprocal going down  Assistance Level: Contact guard assist  Skilled Clinical Factors: Improved sequencing this p.m. Neuromuscular Education  Neuromuscular Education: Yes  NDT Treatment: Standing  Neuromuscular Comments: HANSON balance tasks in // bars to improve standing balance. NBOS, tandem stance, standing x 2 minutes. Standing on blue foam NBos and lateral and anterior/posterior weight shifting.  Level of assistance varies from CGA to Celeste due to retro and L lateral LOB    Seated there ex prior to gait training:  LAQ's, Hip flexion, AP's, Side kicks, ball squeezes with 2# ankle weight    ASSESSMENT/PROGRESS TOWARDS GOALS:   Assessment  Assessment: Patient continues to make progress towards gait goal completing with decreased assistance and improved tolerance. Patient continues to be challenged with standing balance activities with NBOS and tandem stance  Activity Tolerance: Patient tolerated treatment well  Discharge Recommendations: Continue to assess pending progress     Goals:  Long Term Goals  Long term goal 1: Indep bed mobility  Long term goal 2: Indep transfers bed to chair with safest assistive device  Long term goal 3: Ambulate with rollator Indep on level and ramp surfaces >/= 50' to allow safe return home. Long term goal 4: Pt will demonstrate improved score on Ugalde balance assessment 48/56 for decreased risk for falls. PLAN OF CARE/Safety:   Safety Devices  Type of Devices: All fall risk precautions in place; Chair alarm in place    Therapy Time:   Individual   Time In 1000   Time Out 1100   Minutes 60     Minutes: 60  Transfer/Bed mobility trainin  Gait trainin  Neuro re education: 15  Therapeutic ex: DAREK Baker, 22 at 12:11 PM

## 2022-07-13 NOTE — PROGRESS NOTES
Occupational Therapy  OCCUPATIONAL THERAPY  INPATIENT REHAB TREATMENT NOTE  [facility]      NAME: Supriya Barnett  : 1960 (1 y.o.)  MRN: [id number]  CODE STATUS: Full Code  Room: R248/R248-01    Date of Service: 2022    Referring Physician: Dr. José Sparrow  Rehab Diagnosis: impaired mobility and ADLs d/t polyneuropathy and recent fall    Restrictions  Restrictions/Precautions  Restrictions/Precautions: Fall Risk         Position Activity Restriction  Other position/activity restrictions: L Rib fractures - 10 and 11 per patient, dialysis port R chest, No Showering    Patient's date of birth confirmed: Yes    SAFETY:  Safety Devices  Safety Devices in place: Yes  Type of devices: All fall risk precautions in place    SUBJECTIVE:  Subjective: \" Yes I would like to go. \"    Pain at start of treatment: No    Pain at end of treatment: No    Location:   Nursing notified: Not Applicable  RN:   Intervention: None    COGNITION:  Orientation  Overall Orientation Status: Within Normal Limits  Orientation Level: Oriented to place;Oriented to time;Oriented to person;Oriented to situation  Cognition  Overall Cognitive Status: Exceptions  Arousal/Alertness: Appropriate responses to stimuli  Following Commands: Follows multistep commands consistently  Attention Span: Appears intact  Memory: Appears intact  Safety Judgement: Decreased awareness of need for safety;Decreased awareness of need for assistance  Problem Solving: Assistance required to generate solutions;Assistance required to implement solutions  Insights: Decreased awareness of deficits  Initiation: Requires cues for some  Sequencing: Requires cues for some          OBJECTIVE:         Toileting  Assistance Level: Stand by assist  Skilled Clinical Factors: anterior care while seated only this day- brief management: Max A.  Pt uses pull ups at home  Toilet Transfers  Technique: Stand step  Equipment: Standard toilet;Grab bars  Assistance Level: Stand by assist  Tub/Shower Transfers  Skilled Clinical Factors: sponge bath today d/t dialysis port-R chest         Functional Mobility  Device: 4-Wheeled walker  Activity: To/From bathroom  Assistance Level: Stand by assist  Supine to Sit  Assistance Level: Stand by assist  Scooting  Assistance Level: Supervision  Sit to Stand  Assistance Level: Stand by assist  Stand to Sit  Assistance Level: Stand by assist     Pt provided with horiz dowel destiny tower and ring task seated utilizing 1# wrist wts with SBA. Pt placed 10 rings on each destiny excerpt the lower 2 rods x 3 rings. Pt demonstrated difficulty donning rings on higher dowel above head level req intermittent recovery periods to don/doff d/t \"arthritis\" according to pt, limiting pt ROM, but no pain reported when asked. Pt required extended time to complete task d/t SOB and difficulty reaching, increasing pt SOB and decreasing pt endurance levels during task. Pt then completed seated ring tossing into bucket with Sup and instruction. Pt used BUE to gently toss rings with forward trunk movements. Pt completed all above tasks to increase strength and functional act tolerance to improve safety and Ind with transfers and ADL tasks.                                        Education:  Education  Education Given To: Patient  Education Provided: Equipment  Education Provided Comments: Sock aid  Education Method: Verbal  Barriers to Learning: Cognition  Education Outcome: Continued education needed    Equipment recommendations:         ASSESSMENT:  Activity Tolerance: Patient tolerated treatment well      PLAN OF CARE:  Strengthening,Balance training,Functional mobility training,Endurance training,Safety education & training,Patient/Caregiver education & training,Equipment evaluation, education, & procurement,Self-Care / ADL,Home management training  continue with current POC until discharge of 7-16-22    Patient goals : work on strengthening my arms and legs, and improve balance so I don't fall           Therapy Time:   Individual Group Co-Treat   Time In 1300       Time Out 1330         Minutes 30                   ADL/IADL training: 10 minutes  Therapeutic activities: 20 minutes     Electronically signed by:     MARY Last,   7/13/2022, 1:25 PM

## 2022-07-13 NOTE — PROGRESS NOTES
Subjective: The patient complains of  moderate to severe acute      partially relieved by rest, PT, OT, rest, pain medications and exacerbated by recent illness and exertion. Continues to have pain from her multiple rib fractures and is recovering from bilateral pleural effusions and balancing cardiac with renal risks. Nephrology is consulting and there titrating Lasix with caution. She is getting hemodialysis. I am concerned about patients medical complexities and current active problems and barriers to progress including hx of hep C and paranoid schitzophrenia. He is hoping to trial Bengay and heating pad for her achy muscles. Patient is getting nauseated when she is due for her dialysis due to toxic meds tabulate built up. She is responding to dialysis and Tigan. We also discussed her renal osteodystrophy pain and scheduling her Ultram every 8 hours she is in agreement. She does not feel that she is on the right dose of insulin and we will consult Dr. Michele De Jesus. Podiatry was consulted for long toenails and ichthyosis. I have adjusted her vancomycin level checks. She is for hemodialysis. Dr. Jeancarlos Don and nephrology will work on her Lasix dose. They are balancing her renal and cardiac issues. I discussed current functional, rehabilitation, medical status with other rehabilitation providers including nursing and case management. According to recent nursing note, \" Patient back to Room at 1925 from HD. Told in report pt had 3L off-Vs stable. This RN just assessed and pt was eating dinner. BS was not taken, will wait till 2100. Pt c/o pain lower back and l knee. Will check med orders. \".    ROS x10: The patient also complains of severely impaired mobility and activities of daily living. Otherwise no new problems with vision, hearing, nose, mouth, throat, dermal, cardiovascular, GI, , pulmonary, musculoskeletal, psychiatric or neurological. See Rehab H&P on Rehab chart dated .        Vital signs:  BP (!) 129/58   Pulse 78   Temp 97.9 °F (36.6 °C) (Oral)   Resp 13   Ht 5' 7\" (1.702 m)   Wt 195 lb (88.5 kg)   LMP  (LMP Unknown)   SpO2 99%   BMI 30.54 kg/m²   I/O:   PO/Intake:  fair PO intake, no problems observed or reported. Bowel/Bladder:  incontinent,Purwic constipation and urinary urgency. Last BM 7/9/22. General:  Patient is well developed, adequately nourished, non-obese and     well kempt. HEENT:    PERRLA, hearing intact to loud voice, external inspection of ear     and nose benign. Inspection of lips, tongue and gums  No teeth  Musculoskeletal: No significant change in strength or tone. All joints stable. Inspection and palpation of digits and nails show no clubbing,       cyanosis or inflammatory conditions. Neuro/Psychiatric: Affect: flat. Alert and oriented to person, place and     situation. No significant change in deep tendon reflexes or     Sensation    Lungs:  Diminished, CTA-B. Respiration effort is normal at rest.   fractured right 10-11 th ribs. tunneled Vascath left upper    chest with 2 ports. Heart:   S1 = S2, RRR. No loud murmurs. Abdomen:  Soft, non-tender, no enlargement of liver or spleen. Extremities:  Trace lower extremity edema,    tenderness. dialysis fistula graft to right upper arm that is not in use  Skin:   Intact   - discolored and bruised . ruise to back of right shoulder and right knee. Small sores noted to arms x 3     Rehabilitation:  Physical therapy: FIMS:  Bed Mobility: Scooting: Stand by assistance    Transfers: Sit to Stand: Stand by assistance  Stand to sit: Stand by assistance  Bed to Chair: Stand by assistance, Ambulation  Surface: level tile  Device: Rollator  Assistance: Stand by assistance  Quality of Gait: wide JAKE  Gait Deviations: Slow Tere,Decreased step length,Decreased step height  Distance: 60ft- pt exhibiting fatigue with this distance,      FIMS:  ,  , Assessment: Pt is 57 y.o. female to hospital due to septicemia.   Pt exhibits decreased strength, balance, posture and increased pain which increases pt need for assistance with mobility to decreased risk for falls at home. Continued PT is required for safe d/c home with family. Occupational therapy: FIMS:   ,  , Assessment: Pt is a 56 y/o female who presents to [address] [place] [address] for medical decline with fall. Pt lives with dtr who assists with ADLs and IADLs PTA. Pt presents with above deficits and impaired ADL status. Pt may benefit from skilled OT intervention for increased indepdnence and safety upon d/c to home    Speech therapy: FIMS:        Lab/X-ray studies reviewed, analyzed and discussed with patient and staff:   Recent Results (from the past 24 hour(s))   POCT Glucose    Collection Time: 07/12/22 11:31 AM   Result Value Ref Range    POC Glucose 188 (H) 70 - 99 mg/dl    Performed on ACCU-CHEK    Vancomycin Level, Random    Collection Time: 07/12/22  1:15 PM   Result Value Ref Range    Vancomycin Rm 12.2 10.0 - 40.0 ug/mL   POCT Glucose    Collection Time: 07/12/22  9:01 PM   Result Value Ref Range    POC Glucose 215 (H) 70 - 99 mg/dl    Performed on ACCU-CHEK    POCT Glucose    Collection Time: 07/13/22  7:09 AM   Result Value Ref Range    POC Glucose 159 (H) 70 - 99 mg/dl    Performed on ACCU-CHEK        Previous extensive, complex labs, notes and diagnostics reviewed and analyzed. ALLERGIES:    Allergies as of 07/09/2022 - Fully Reviewed 06/30/2022   Allergen Reaction Noted    Codeine Hives 12/09/2011    Oxycontin [oxycodone hcl] Hives 06/15/2020      (please also verify by checking MAR)        I reviewed her [facility] prescription monitoring service data sheets in hopes of eliminating polypharmacy and weaning to the lowest effective dose of pain medications and eliminating the concomitant use of benzodiazepines. I see no medications of concern. I see no habits of combining sedatives and narcotics. Complex Physical Medicine & Rehab Issues Assess & Plan:   1.  Severe abnormality of gait and mobility and impaired self-care and ADL's secondary to progressive weakness dt OA flareup and  Polyneuropathy . Functional and medical status reassessed regarding patients ability to participate in therapies and patient found to be able to participate in acute intensive comprehensive inpatient rehabilitation program including PT/OT to improve balance, ambulation, ADLs, and to improve the P/AROM. Therapeutic modifications regarding activities in therapies, place, amount of time per day and intensity of therapy made daily. In bed therapies or bedside therapies prn.   2. Bowel progressive constipation, and Bladder dysfunction monitoring neurogenic bladder,   Incontinence of urine:  frequent toileting, ambulate to bathroom with assistance, check post void residuals. Check for C.difficile x1 if >2 loose stools in 24 hours, continue bowel & bladder program.  Monitor bowel and bladder function. Lactinex 2 PO every AC. MOM prn, Brown Bomb prn, Glycerin suppository prn, enema prn.  3. Severe chest wall pain as well as generalized OA pain, fracture T11 chronic pain of both shoulders: reassess pain every shift and prior to and after each therapy session, give prn Tylenol and Ultram, modalities prn in therapy, Lidoderm, K-pad prn.   4. Skin healing and breakdown risk:  continue pressure relief program.  Daily skin exams and reports from nursing. Add co-Q10  5. Severe fatigue due to nutritional and hydration deficiency: Add vitamin B12 vitamin D and CoQ10 continue to monitor I&Os, calorie counts prn, dietary consult prn. Add healthy HS snack. 6. Acute episodic insomnia with situational adjustment disorder:  consider prn low dose Ambien, monitor for day time sedation. Add HS \"Tuck In\"  7. Falls risk elevated:  patient to use call light to get nursing assistance to get up, bed and chair alarm.   8. Elevated DVT risk: progressive activities in PT, continue prophylaxis MAHAMED hose, elevation and avoid Lovenox due to renal failure. 9. Complex discharge planning: Have okayed extending her to discharge on Sunday the 17th because of her dialysis schedule and to accommodate her fatigue and family needs. Discharge July 17, 2020 to home with her daughter who is her hired caregiver with follow-up hemodialysis friends [facility] Tuesday Thursday and Saturdays as well as home health care. Weekly team meeting  every Monday to re-assess progress towards goals, discuss and address social, psychological and medical comorbidities and to address difficulties they may be having progressing in therapy. Patient and family education is in progress. The patient is to follow-up with their family physician after discharge. Complex Active General Medical Issues that complicate care Assess & Plan:     1. Principal Problem:    Impaired mobility and activities of daily living dt polyneuropathy and reccent fall   Active Problems:  1. Chronic CHF, CKD (chronic kidney disease) stage 4, GFR 15-29 ml/min,   ESRD (end stage renal disease) on dialysis, Patient is getting nauseated when she is due for her dialysis due to toxic meds tabulate built up. She is responding to dialysis and Tigan. We also discussed her renal osteodystrophy pain and scheduling her Ultram every 8 hours she is in agreement. Consult nephrology. Titrate Lasix-balance cardiac and renal risk  2. Closed T11 fracture renal osteodystrophy,   Multiple closed fractures of ribs of right side,   Chest wall contusion, left -Lidoderm, vitamin D, abdominal binder as tolerated  3. Encephalopathy acute,   Cerebral microvascular disease-limit toxic medications  4. MRSA bacteremia with Septicemia -IV vancomycin consult pharmacy for dosing monitor renal function and adjust dose. 5.   Chronic hepatitis C-eliminate toxic medications  6. Vitamin B 12 deficiency, Vitamin D insufficiency-Add high-dose vitamin D, recheck vitamin D level out after discharge,  7.  Now with low blood pressure but history of essential (primary) hypertension,   Chronic diastolic congestive heart failure-Acute rehab to monitor heart rate and rhythm with the option of telemetry and the effects of chronotropic medication with respect to increasing physical activity and exercise in PT, OT, ADLs with medication titration to lowest effective dosing. Continue blood signs every shift focusing on heart rate, rhythm and blood pressure checks with orthostatic checks-monitoring the effect of exercise, therapy and posture. Consult hospitalist for backup medical and adjust/add medications (aspirin, Imdur, Lipitor, ProAmatine). Monitor heart rate and blood pressure as well as medications effects on vital signs before during and after therapy with especial focus on preventing orthostasis and falls risk. 8.   Controlled type 2 diabetes mellitus with diabetic neuropathy, with long-term current use of insulin-Continue blood sugar checks every shift, diet, add diabetic add dietary education, restrict carbohydrates to lowest effective and safe carb count per meal advising 4 carbs per meal, add at bedtime snack to prevent a.m. hypoglycemia, adjust/add medications (Lantus, sliding scale insulin)   9. Pulmonary hypertension -Acute rehab for endurance traing with Pulse Ox to monitoring oxygen saturation and heart rate with O2 titration to lowest effective dose. Pulse oximeter checks to shift and at HS to dose and titrate oxygen and aerosol treatments monitor for nocturnal hypoxemia, monitor vital signs, oxygen prn. Focus on energy conservation. 10.   Paranoid schizophrenia -emotional support provided daily, vitamin B12, encourage participation in rehabilitation support group and recreational therapy, adjust/add medications -patient had been on Zoloft at home. Focus of today's plan-  Initiate and modify therapuetic plan to meet patients individual needs, add rest breaks as needed  -consider resuming SSRI.  Patient is getting nauseated when she is due for her dialysis due to toxic meds tabulate built up. She is for hemodialysis. Dr. Lorrie Haddad and nephrology will work on her Lasix dose. They are balancing her renal and cardiac issues.       Valeri Potts D.O., PM&R     Attending    [address]

## 2022-07-13 NOTE — PROGRESS NOTES
[facility]  Facility/Department: [facility]  Speech Language Pathology   Treatment Note  Afua Mas  1960  [id number]  [x]   confirmed    Date: 2022    Rehab Diagnosis:        Restrictions/Precautions: Fall Risk  Position Activity Restriction  Other position/activity restrictions: L Rib fractures - 10 and 11 per patient, dialysis port R chest, No Showering    Weight: 195 lb (88.5 kg)   ADULT ORAL NUTRITION SUPPLEMENT; Breakfast, Lunch, Dinner; Diabetic Oral Supplement  ADULT DIET; Dysphagia - Soft and Bite Sized; 3 carb choices (45 gm/meal); No Added Salt (3-4 gm); 1800 ml  SpO2: 99 % (22 0644)  No active isolations    Speech Dx: Cognitive Linguistic Impairment    Subjective:  Alert, Cooperative and Pleasant        Interventions used this date:  Cognitive Skill Development, Dysphagia Treatment, Oral Motor Treatment and Instruction in Compensatory Strategies    Objective/Assessment:  Patient progressing towards goals:  Short-term Goals for Cognition:   Goal 1: To increase safety awareness and judgment for safe completion of ADLs secondary to pt's cognitive deficits,  pt will complete high level problem solving tasks related to ADLs with 80% accuracy and min cues. Not addressed. Goal 2: To increase safety awareness and judgment for safe completion of ADLs secondary to pt's cognitive deficits, pt will complete abstract reasoning tasks (i.e. Word deduction, convergent and divergent naming, similarities/differences) with 80% accuracy and min cues. Abstract convergent: 100% acc  Abstract divergent: 69% acc    Goal 3: To address pt's cognitive deficits and promote her ability to safely follow directives in a variety of environments, pt will carry out verbal directives of increasing complexity in everyday activities with 80% accuracy and min cues. Not addressed. Goal 4:  To promote awareness and functionality of compensatory strategies in light of pt's cognitive deficits, pt will recall 2 memory strategies with min cues and utilize them in  structured tasks in 80% of opportunities with min cues. Pt educated on memory the following strategies: calendars, routines, and central location. Pt stated verbal understanding of education and was able to provide an example of how she could implement each strategy at home. Goal 5: To decrease cognitive deficits and improve attention to tasks for safe completion of ADLs, pt will complete structured tasks addressing alternating and divided attention with 80% accuracy and min cues. Divided attention: 5-stack card exercise - 40% acc independently, increasing to 100% acc given mild-mod verbal prompts. Short-term Goals for Dysphagia:  Goal 1: Pt will complete lingual exercises that promote anterior to posterior propulsion of bolus and improve tongue base retraction with 80% accuracy in order to strengthen the muscles of the swallow to decrease risk of aspiration and to increase ability to safely handle the least restrictive diet level. Pt completed 1 set of tongue press exercise 10x each with good effort given mild verbal prompts. Goal 2: Pt will complete effortful swallow exercise with 80% accuracy, given cues as needed, to decrease bolus residue in the valleculae per all consistencies. Pt completed 1 set of effortful swallow exercise 5x with fair effort given increased time to initiate swallow and min prompts/cues. Goal 3: Pt will tolerate 5/5 trials of easy to chew solids with adequate mastication and oral clearance of bolus in all opportunities. Not addressed. Goal 4: Pt will tolerate the recommended diet level without overt s/s of aspiration in all opportunities. Not addressed. Treatment/Activity Tolerance:  Patient tolerated treatment well    Plan:  Continue per POC    Pain Assessment:  Patient c/o pain: 4 pain in back and knee. Reports that she already received pain meds.     Pain Re-assessment:  Patient does not appear in pain.    Patient/Caregiver Education:  Patient educated on session and progression towards goals. Patient stated verbal understanding of directions.     Safety Devices:  Bed alarm in place and Call light within reach  PCA in room    Dysphagia Outcome Severity Scale    SWALLOWING  Ratin      Speech Therapy Level of Assistance Scale    AUDITORY COMPREHENSION  Rating:  Independent-Modified Independent    VERBAL EXPRESSION  Rating:  Modified Independent    MOTOR SPEECH  Rating: Independent    PROBLEM SOLVING  Rating: Supervised Assistance    MEMORY  Rating:  Supervised Assistance-Minimal Assistance      Therapy Time  SLP Individual Minutes  Time In: 1100  Time Out: 1130  Minutes: 30   Cognition: 20 minutes  Swallow: 10 minutes     Signature: Electronically signed by ANN MARIE Ludwig on 2022 at 11:37 AM

## 2022-07-13 NOTE — PLAN OF CARE
Problem: Discharge Planning  Goal: Discharge to home or other facility with appropriate resources  7/12/2022 2343 by Jere Camp RN  Outcome: Progressing  Flowsheets (Taken 7/12/2022 2031)  Discharge to home or other facility with appropriate resources: Identify barriers to discharge with patient and caregiver  7/12/2022 1408 by Marques Zimmerman RN  Outcome: Progressing     Problem: Safety - Adult  Goal: Free from fall injury  7/12/2022 2343 by Jere Camp RN  Outcome: Progressing  7/12/2022 1408 by Marques Zimmerman RN  Outcome: Progressing  Flowsheets (Taken 7/12/2022 1405)  Free From Fall Injury: Based on caregiver fall risk screen, instruct family/caregiver to ask for assistance with transferring infant if caregiver noted to have fall risk factors     Problem: Skin/Tissue Integrity  Goal: Absence of new skin breakdown  Description: 1. Monitor for areas of redness and/or skin breakdown  2. Assess vascular access sites hourly  3. Every 4-6 hours minimum:  Change oxygen saturation probe site  4. Every 4-6 hours:  If on nasal continuous positive airway pressure, respiratory therapy assess nares and determine need for appliance change or resting period.   7/12/2022 2343 by Jere Camp RN  Outcome: Progressing  7/12/2022 1408 by Marques Zimmerman RN  Outcome: Progressing     Problem: ABCDS Injury Assessment  Goal: Absence of physical injury  7/12/2022 2343 by Jere Camp RN  Outcome: Progressing  7/12/2022 1408 by Marques Zimmerman RN  Outcome: Progressing  Flowsheets  Taken 7/12/2022 1405 by Marques Zimmerman RN  Absence of Physical Injury: Implement safety measures based on patient assessment  Taken 7/12/2022 0318 by Jere Camp RN  Absence of Physical Injury: Implement safety measures based on patient assessment     Problem: Nutrition Deficit:  Goal: Optimize nutritional status  7/12/2022 2343 by Jere Camp RN  Outcome: Progressing  7/12/2022 1408 by Marques Zimmerman RN  Outcome: Progressing Problem: Chronic Conditions and Co-morbidities  Goal: Patient's chronic conditions and co-morbidity symptoms are monitored and maintained or improved  7/12/2022 2343 by Venus Rasheed RN  Outcome: Progressing  Flowsheets (Taken 7/12/2022 2031)  Care Plan - Patient's Chronic Conditions and Co-Morbidity Symptoms are Monitored and Maintained or Improved: Monitor and assess patient's chronic conditions and comorbid symptoms for stability, deterioration, or improvement  7/12/2022 1408 by Radha Moser RN  Outcome: Progressing

## 2022-07-13 NOTE — PROGRESS NOTES
[facility] Daily Progress Note  Name: Richard Cox  Age: 57 y.o. Gender: female  CodeStatus: Full Code  Allergies: Codeine  Oxycontin [Oxycodone Hcl]    Chief Complaint:No chief complaint on file. Primary Care Provider: Josh White MD  InpatientTreatment Team: Treatment Team: Attending Provider: Diane Perez DO; Consulting Physician: Matty Briseno MD; Consulting Physician: Carroll Vang DO; Consulting Physician: Alexis Schulz MD; Consulting Physician: Eddie Lynch MD; Registered Nurse: Marianela Cheng RN; Occupational Therapist: Ana Cristina Dillon OTR/L; Occupational Therapist: Samuel Hooker OT; Occupational Therapist Assistant: MARY Reyes; : STEPHENIE Brice, AFUAW  Admission Date: 7/9/2022      HPI   Pt seen and examined on rehab unit for gait ataxia with falls in the setting of severe cervical canal stenosis. Patient currently alert and oriented x3, no acute distress, cooperative. Reports overall she feels she is getting a little stronger with therapies. Afebrile. Remains on vancomycin for bacteremia secondary to infected dialysis catheter. Vitals:    07/13/22 0644   BP: (!) 129/58   Pulse: 78   Resp: 13   Temp: 97.9 °F (36.6 °C)   SpO2: 99%      Review of Systems   Constitutional: Negative for appetite change and fever. HENT: Negative for hearing loss and trouble swallowing. Eyes: Negative for visual disturbance. Respiratory: Negative for cough, chest tightness, shortness of breath and wheezing. Cardiovascular: Negative for chest pain, palpitations and leg swelling. Gastrointestinal: Negative for abdominal distention, nausea and vomiting. Genitourinary: Negative for difficulty urinating. Musculoskeletal: Positive for gait problem. Skin: Negative for color change and rash. Neurological: Positive for weakness.  Negative for dizziness, tremors, seizures, syncope, facial asymmetry, speech difficulty, light-headedness, numbness and headaches. Psychiatric/Behavioral: Negative for agitation, confusion and hallucinations. The patient is not nervous/anxious. Physical Exam  Vitals and nursing note reviewed. Constitutional:       General: She is not in acute distress. Appearance: She is not diaphoretic. HENT:      Head: Normocephalic. Eyes:      Pupils: Pupils are equal, round, and reactive to light. Cardiovascular:      Rate and Rhythm: Normal rate and regular rhythm. Pulmonary:      Effort: Pulmonary effort is normal. No respiratory distress. Breath sounds: Normal breath sounds. Abdominal:      General: Bowel sounds are normal. There is no distension. Palpations: Abdomen is soft. Tenderness: There is no abdominal tenderness. Skin:     General: Skin is warm and dry. Neurological:      Mental Status: She is alert and oriented to person, place, and time. Cranial Nerves: No cranial nerve deficit. Motor: Weakness present. No tremor, atrophy, abnormal muscle tone, seizure activity or pronator drift. Coordination: Coordination normal. Finger-Nose-Finger Test normal.      Gait: Gait abnormal.      Deep Tendon Reflexes: Reflexes abnormal.      Reflex Scores:       Patellar reflexes are 0 on the right side and 0 on the left side. Achilles reflexes are 0 on the right side and 0 on the left side.               Medications:  Reviewed    Infusion Medications:    dextrose       Scheduled Medications:    furosemide  40 mg Oral Daily    traMADol  25 mg Oral 3 times per day    acetaminophen  500 mg Oral 3 times per day    Vitamin D  2,000 Units Oral Dinner    cyanocobalamin  1,000 mcg IntraMUSCular Weekly    coenzyme Q10  100 mg Oral Daily    ARIPiprazole  5 mg Oral Daily    aspirin EC  81 mg Oral Daily    atorvastatin  40 mg Oral Nightly    insulin glargine  35 Units SubCUTAneous Nightly    insulin lispro  0-12 Units SubCUTAneous TID     insulin lispro  0-6 Units SubCUTAneous Nightly    isosorbide mononitrate  120 mg Oral Daily    lidocaine  3 patch TransDERmal Daily    magnesium oxide  400 mg Oral Daily    melatonin  10 mg Oral Nightly    miconazole   Topical BID    midodrine  5 mg Oral Once per day on Mon Wed Fri    polyethylene glycol  17 g Oral Daily    sertraline  50 mg Oral Nightly    vancomycin (VANCOCIN) intermittent dosing (placeholder)   Other RX Placeholder     PRN Meds: heparin (porcine), trimethobenzamide, melatonin, acetaminophen **OR** acetaminophen, albuterol, camphor-menthol-methyl salicylate, dextrose, dextrose bolus **OR** dextrose bolus, glucagon (rDNA), glucose, hydrOXYzine HCl, polyethylene glycol    Labs:   No results for input(s): WBC, HGB, HCT, PLT in the last 72 hours. No results for input(s): NA, K, CL, CO2, BUN, CREATININE, CALCIUM, PHOS in the last 72 hours. Invalid input(s): MAGNES  No results for input(s): AST, ALT, BILIDIR, BILITOT, ALKPHOS in the last 72 hours. No results for input(s): INR in the last 72 hours. No results for input(s): Hannah Harveyarch in the last 72 hours. Urinalysis:   Lab Results   Component Value Date/Time    NITRU Negative 07/10/2022 04:51 AM    WBCUA 0-2 07/10/2022 04:51 AM    BACTERIA Negative 07/10/2022 04:51 AM    RBCUA 3-5 07/10/2022 04:51 AM    BLOODU Negative 07/10/2022 04:51 AM    SPECGRAV 1.009 07/10/2022 04:51 AM    GLUCOSEU 100 07/10/2022 04:51 AM       Radiology:   Most recent    EEG No valid procedures specified. MRI of Brain No results found for this or any previous visit. Results for orders placed during the hospital encounter of 06/30/22    MRI BRAIN WO CONTRAST    Narrative  EXAM: MRI of the brain without contrast    History: Stroke. Weakness. Technique: Multiplanar multisequence MRI of the brain was performed without contrast.    Comparison: MRI of the brain October 9, 2017.  CT of the brain June 30, 2022    Findings:    Foci of white matter signal abnormality within the supratentorial white matter are nonspecific but most compatible with chronic small vessel ischemic changes in a patient of this age. Prominence of the sulci and ventricles compatible with mild generalized parenchymal volume loss. No acute hemorrhage, mass, mass effect, midline shift, or abnormal extra-axial fluid collection. Midline structures are within normal limits. The posterior  fossa is within normal limits. There is no diffusion restriction. No susceptibility artifact is identified on the gradient echo sequence. The major intracranial vascular flow voids are maintained. Cranial nerves 7/8 complexes appear grossly unremarkable. Mild paranasal sinus mucosal thickening. Fluid is present within the bilateral mastoid air cells compatible with nonspecific mastoid air  cell effusions. Impression  No acute ischemia or acute intracranial process. Generalized parenchymal volume loss and nonspecific white matter findings most compatible with chronic small vessel ischemic changes in a patient of this age. MRA of the Head and Neck: No results found for this or any previous visit. No results found for this or any previous visit. No results found for this or any previous visit. CT of the Head: Results for orders placed during the hospital encounter of 06/30/22    CT HEAD WO CONTRAST    Narrative  CT HEAD WO CONTRAST : 6/30/2022    CLINICAL HISTORY:  fall from standing last night confused at dialysis c/o back pain . COMPARISON: 10/11/2021. TECHNIQUE: Spiral unenhanced images were obtained of the head, with routine multiplanar reconstructions performed. All CT scans at this facility use dose modulation, iterative reconstruction, and/or weight based dosing when appropriate to reduce radiation dose to as low as reasonably achievable.       FINDINGS:    There is no intracranial hemorrhage, mass effect, midline shift, extra-axial collection, evidence of hydrocephalus, recent ischemic infarct, or skull fracture identified. Chronic volume loss and mild mucosal thickening is again noted within the maxillary sinuses. The mastoid air cells and other visualized paranasal sinuses are essentially clear. Impression  NO ACUTE INTRACRANIAL PROCESS OR SIGNIFICANT CHANGE FROM 10/11/2021 IDENTIFIED. No results found for this or any previous visit. No results found for this or any previous visit. Carotid duplex: No results found for this or any previous visit. No results found for this or any previous visit. Results for orders placed during the hospital encounter of 12/01/21    US CAROTID ARTERY BILATERAL    Narrative  EXAMINATION:  CAROTID DUPLEX ULTRASONOGRAPHY    CLINICAL HISTORY:  DIZZINESS AND CAROTID STENOSIS    COMPARISONS:  October 9, 2017    TECHNIQUE:  B-mode, color flow and spectral Doppler    FINDINGS:    ARTERIAL BLOOD FLOW VELOCITY    RIGHT PS    Prox CCA    88 cm/s  Mid CCA     71 cm/s  Dist CCA    71 cm/s  Prox ICA    63 cm/s  Mid ICA     126 cm/s  Dist ICA    116 cm/s  Prox ECA    93 cm/s  Prox VERT   41 cm/s    ICA/CCA     1.78    LEFT PS    Prox CCA    183 cm/s  Mid CCA     96 cm/s  Dist CCA    82 cm/s  Prox ICA    129 cm/s  Mid ICA     110 cm/s  Dist ICA    84 cm/s  Prox ECA    90 cm/s  Prox VERT   58 cm/s    ICA/CCA     1.42    Impression  MILD SCATTERED ATHEROSCLEROSIS BOTH CAROTID TREES. AREAS OF INTIMAL THICKENING. NO FLOW OBSTRUCTION NOTED. BY VELOCITIES BILATERAL ICAS 50-69% STENOSIS. BILATERAL ANTEGRADE VERTEBRAL FLOW. Echo No results found for this or any previous visit. Assessment/Plan:  7/11/22:  Gait ataxia with falls  MRI of the brain negative for acute findings  MRI of the lumbar spine did not show significant canal stenosis. DDD noted. MRI of the cervical spine shows severe canal stenosis from C4-5 through C6-7. Neurosurgery consulted and recommending conservative treatment at this time.   Continue with therapies and we will continue to follow her clinical course    7/13/22:   Gait ataxia with falls with noted severe canal stenosis from C4-5 through C6-7. Patient with bacteremia from infected dialysis catheter currently being treated with vancomycin. Infectious disease following. Dialysis catheter was changed. Neurosurgery recommending conservative treatment at this time for patient's cervical canal stenosis given her underlying medical issues. We will continue with therapies. Plans for discharge home on 7/17/2022 preliminarily.         Collaborating physicians: Dr Melony De L aRosa    Electronically signed by LORETO Goddard CNP on 7/13/2022 at 1:34 PM

## 2022-07-13 NOTE — PROGRESS NOTES
Physical Therapy Rehab Treatment Note  Fac[facility]Department[facility]  [facility]       NAME: Maritza Vaughn  : 1960 (31 y.o.)  MRN: [id number]  CODE STATUS: Full Code    Date of Service: 2022     Restrictions:  Restrictions/Precautions: Fall Risk  Position Activity Restriction  Other position/activity restrictions: L Rib fractures - 10 and 11 per patient    SUBJECTIVE:   Subjective: I am feeling better today     Pain  Pain: 6/10 R LBP L knee pain pre session 4/10 post session    OBJECTIVE:   Supine to sit  Assistance Level: Independent    Sit to Stand  Assistance Level: Modified independent  Stand to Sit  Assistance Level: Supervision  Skilled Clinical Factors: With fatigue patient sits quickly  Bed To/From Chair  Assistance Level: Supervision    Ambulation  Surface: Level surface; Uneven surface; Carpet; Ramp  Device: Rollator  Distance: 50' x 2  Activity: Within Unit  Activity Comments: No increase in L knee pain with weight bearing this a.m. reciprocal pattern  Improved R foot clearance  Additional Factors: Verbal cues  Assistance Level: Supervision  Skilled Clinical Factors: 2# ankle weight added to improve foot clearance and endurance    PT Exercises  Exercise Treatment: Supine Exercises: Quad sets, Glute sets, Ankle pumps, Hip abduction, Straight leg raises, Bridges x 20 each     Activity Tolerance  Activity Tolerance: Patient tolerated treatment well    ASSESSMENT/PROGRESS TOWARDS GOALS:   Assessment  Assessment: Patient fatigued with gait this p.m. Supine exercises completed with handout provided. Patient to complete in room to improve strength, endurance and decrease level of assistance with gait and transfers  Activity Tolerance: Patient tolerated treatment well  Discharge Recommendations: Continue to assess pending progress    Goals:  Long Term Goals  Long term goal 1: Indep bed mobility  Long term goal 2:  Indep transfers bed to chair with safest assistive device  Long term goal 3: Ambulate with rollator Indep on level and ramp surfaces >/= 50' to allow safe return home. Long term goal 4: Pt will demonstrate improved score on Ugalde balance assessment 48/56 for decreased risk for falls. PLAN OF CARE/Safety:   Safety Devices  Type of Devices: All fall risk precautions in place; Chair alarm in place      Therapy Time:   Individual   Time In 1330   Time Out 1405   Minutes 35      5 extra minutes completed due to missed time from earlier session  Minutes: 35  Transfer/Bed mobility trainin  Gait training: 15  Therapeutic ex:Kaylen North PTA, 22 at 2:19 PM

## 2022-07-13 NOTE — PROGRESS NOTES
Infectious Disease     Patient Name: Florence Cary  Date: 7/13/2022  YOB: 1960  Medical Record Number: [id number]        Sepsis with Enterococcus  Infected dialysis cath     mitral valve regurgitation  coronary artery bypass graft x1 vessel with tricuspid valve repair on 1/21/2022      Blood cultures from admission 2 sets growing gram-positive cocci in chains  Identified as Enterococcus faecalis by PCR  Patient currently on vancomycin            Review of Systems   Respiratory: Negative. Cardiovascular: Negative. Gastrointestinal: Negative. Physical Exam  Cardiovascular:      Heart sounds: Normal heart sounds. No murmur heard. Pulmonary:      Effort: Pulmonary effort is normal. No respiratory distress. Breath sounds: Normal breath sounds. No wheezing, rhonchi or rales. Abdominal:      General: Abdomen is flat. Bowel sounds are normal. There is no distension. Palpations: Abdomen is soft. There is no mass. Tenderness: There is no abdominal tenderness. There is no guarding. Blood pressure (!) 129/58, pulse 78, temperature 97.9 °F (36.6 °C), temperature source Oral, resp. rate 13, height 5' 7\" (1.702 m), weight 195 lb (88.5 kg), SpO2 99 %, not currently breastfeeding.       .   Lab Results   Component Value Date    WBC 9.4 07/10/2022    HGB 9.7 (L) 07/10/2022    HCT 26.9 (L) 07/10/2022    MCV 90.6 07/10/2022     07/10/2022     Lab Results   Component Value Date/Time     07/09/2022 07:53 PM    K 4.7 07/09/2022 07:53 PM    K 4.3 07/04/2022 03:07 AM    CL 91 07/09/2022 07:53 PM    CO2 26 07/09/2022 07:53 PM    BUN 25 07/09/2022 07:53 PM    CREATININE 3.43 07/09/2022 07:53 PM    GLUCOSE 224 07/09/2022 07:53 PM    GLUCOSE 419 07/30/2021 08:16 AM    CALCIUM 9.8 07/09/2022 07:53 PM         7/1/22 1613   Culture Catheter Tip Culture, Catheter Tip:  NO GROWTH   Performed at [facility] [id number] [place] [address], [address] [place] [address]   ([phone] 6/30/22 0810   Culture, Blood Id Sensitivity  Abnormal   Cult,Blood:  POSITIVE Blood Culture   Performed at [address]   ([phone]   P      Organism Enterococcus faecalis Abnormal  P    Resulting Agency [address] [facility]          Susceptibility      Enterococcus faecalis (2)    Antibiotic Interpretation Microscan  Method Status    ampicillin Sensitive <=2 mcg/mL BACTERIAL SUSCEPTIBILITY PANEL BY DIYA     vancomycin Sensitive 1 mcg/mL BACTERIAL SUSCEPTIBILITY PANEL BY DIYA                   7/1/22 1613   Culture Catheter Tip Culture, Catheter Tip:  NO GROWTH          ASSESSMENT:  PLAN:    Sepsis with Enterococcus  Infected dialysis cath   vancomycin

## 2022-07-13 NOTE — CARE COORDINATION
Patient continues to be inpatient at [facility] [place].   ACM will sign off at this time as patient unable to engage in [facility] program.

## 2022-07-14 DIAGNOSIS — F33.1 MODERATE EPISODE OF RECURRENT MAJOR DEPRESSIVE DISORDER (HCC): ICD-10-CM

## 2022-07-14 LAB
ANION GAP SERPL CALCULATED.3IONS-SCNC: 13 MEQ/L (ref 9–15)
BASOPHILS ABSOLUTE: 0.1 K/UL (ref 0–0.2)
BASOPHILS RELATIVE PERCENT: 1 %
BUN BLDV-MCNC: 38 MG/DL (ref 8–23)
CALCIUM SERPL-MCNC: 10.2 MG/DL (ref 8.5–9.9)
CHLORIDE BLD-SCNC: 93 MEQ/L (ref 95–107)
CO2: 21 MEQ/L (ref 20–31)
CREAT SERPL-MCNC: 3.62 MG/DL (ref 0.5–0.9)
EOSINOPHILS ABSOLUTE: 0.4 K/UL (ref 0–0.7)
EOSINOPHILS RELATIVE PERCENT: 5.1 %
GFR AFRICAN AMERICAN: 15.3
GFR NON-AFRICAN AMERICAN: 12.6
GLUCOSE BLD-MCNC: 113 MG/DL (ref 70–99)
GLUCOSE BLD-MCNC: 173 MG/DL (ref 70–99)
GLUCOSE BLD-MCNC: 178 MG/DL (ref 70–99)
GLUCOSE BLD-MCNC: 189 MG/DL (ref 70–99)
GLUCOSE BLD-MCNC: 211 MG/DL (ref 70–99)
HBA1C MFR BLD: 6 % (ref 4.8–5.9)
HCT VFR BLD CALC: 26.9 % (ref 37–47)
HEMOGLOBIN: 9.2 G/DL (ref 12–16)
LYMPHOCYTES ABSOLUTE: 1.2 K/UL (ref 1–4.8)
LYMPHOCYTES RELATIVE PERCENT: 13.7 %
MCH RBC QN AUTO: 31.2 PG (ref 27–31.3)
MCHC RBC AUTO-ENTMCNC: 34.2 % (ref 33–37)
MCV RBC AUTO: 91.3 FL (ref 82–100)
MONOCYTES ABSOLUTE: 0.8 K/UL (ref 0.2–0.8)
MONOCYTES RELATIVE PERCENT: 9.3 %
NEUTROPHILS ABSOLUTE: 6 K/UL (ref 1.4–6.5)
NEUTROPHILS RELATIVE PERCENT: 70.9 %
PDW BLD-RTO: 14.1 % (ref 11.5–14.5)
PERFORMED ON: ABNORMAL
PLATELET # BLD: 255 K/UL (ref 130–400)
POTASSIUM SERPL-SCNC: 5.6 MEQ/L (ref 3.4–4.9)
RBC # BLD: 2.94 M/UL (ref 4.2–5.4)
SODIUM BLD-SCNC: 127 MEQ/L (ref 135–144)
VANCOMYCIN RANDOM: 17.3 UG/ML (ref 10–40)
WBC # BLD: 8.4 K/UL (ref 4.8–10.8)

## 2022-07-14 PROCEDURE — 97110 THERAPEUTIC EXERCISES: CPT

## 2022-07-14 PROCEDURE — 6360000002 HC RX W HCPCS: Performed by: INTERNAL MEDICINE

## 2022-07-14 PROCEDURE — 80048 BASIC METABOLIC PNL TOTAL CA: CPT

## 2022-07-14 PROCEDURE — 83036 HEMOGLOBIN GLYCOSYLATED A1C: CPT

## 2022-07-14 PROCEDURE — 80202 ASSAY OF VANCOMYCIN: CPT

## 2022-07-14 PROCEDURE — 99232 SBSQ HOSP IP/OBS MODERATE 35: CPT | Performed by: PHYSICAL MEDICINE & REHABILITATION

## 2022-07-14 PROCEDURE — 97535 SELF CARE MNGMENT TRAINING: CPT

## 2022-07-14 PROCEDURE — 90935 HEMODIALYSIS ONE EVALUATION: CPT

## 2022-07-14 PROCEDURE — 6370000000 HC RX 637 (ALT 250 FOR IP): Performed by: INTERNAL MEDICINE

## 2022-07-14 PROCEDURE — 1180000000 HC REHAB R&B

## 2022-07-14 PROCEDURE — 6370000000 HC RX 637 (ALT 250 FOR IP): Performed by: PHYSICAL MEDICINE & REHABILITATION

## 2022-07-14 PROCEDURE — 97112 NEUROMUSCULAR REEDUCATION: CPT

## 2022-07-14 PROCEDURE — 36415 COLL VENOUS BLD VENIPUNCTURE: CPT

## 2022-07-14 PROCEDURE — 99232 SBSQ HOSP IP/OBS MODERATE 35: CPT | Performed by: INTERNAL MEDICINE

## 2022-07-14 PROCEDURE — 97116 GAIT TRAINING THERAPY: CPT

## 2022-07-14 PROCEDURE — 2580000003 HC RX 258: Performed by: INTERNAL MEDICINE

## 2022-07-14 PROCEDURE — 85025 COMPLETE CBC W/AUTO DIFF WBC: CPT

## 2022-07-14 RX ADMIN — INSULIN LISPRO 2 UNITS: 100 INJECTION, SOLUTION INTRAVENOUS; SUBCUTANEOUS at 08:19

## 2022-07-14 RX ADMIN — Medication 10 MG: at 20:59

## 2022-07-14 RX ADMIN — ASPIRIN 81 MG: 81 TABLET, COATED ORAL at 08:16

## 2022-07-14 RX ADMIN — ACETAMINOPHEN 650 MG: 325 TABLET ORAL at 04:06

## 2022-07-14 RX ADMIN — FUROSEMIDE 40 MG: 40 TABLET ORAL at 08:17

## 2022-07-14 RX ADMIN — INSULIN GLARGINE 25 UNITS: 100 INJECTION, SOLUTION SUBCUTANEOUS at 21:05

## 2022-07-14 RX ADMIN — Medication 100 MG: at 08:17

## 2022-07-14 RX ADMIN — TRAMADOL HYDROCHLORIDE 25 MG: 50 TABLET, COATED ORAL at 15:50

## 2022-07-14 RX ADMIN — Medication 2000 UNITS: at 17:44

## 2022-07-14 RX ADMIN — ISOSORBIDE MONONITRATE 120 MG: 60 TABLET, EXTENDED RELEASE ORAL at 08:17

## 2022-07-14 RX ADMIN — TRAMADOL HYDROCHLORIDE 25 MG: 50 TABLET, COATED ORAL at 21:17

## 2022-07-14 RX ADMIN — Medication 400 MG: at 08:17

## 2022-07-14 RX ADMIN — ACETAMINOPHEN 500 MG: 500 TABLET ORAL at 21:16

## 2022-07-14 RX ADMIN — SERTRALINE 50 MG: 25 TABLET, FILM COATED ORAL at 20:59

## 2022-07-14 RX ADMIN — TRAMADOL HYDROCHLORIDE 25 MG: 50 TABLET, COATED ORAL at 06:05

## 2022-07-14 RX ADMIN — ATORVASTATIN CALCIUM 40 MG: 40 TABLET, FILM COATED ORAL at 20:59

## 2022-07-14 RX ADMIN — ACETAMINOPHEN 500 MG: 500 TABLET ORAL at 15:50

## 2022-07-14 RX ADMIN — ARIPIPRAZOLE 5 MG: 5 TABLET ORAL at 08:17

## 2022-07-14 RX ADMIN — INSULIN LISPRO 2 UNITS: 100 INJECTION, SOLUTION INTRAVENOUS; SUBCUTANEOUS at 21:05

## 2022-07-14 RX ADMIN — VANCOMYCIN HYDROCHLORIDE 1250 MG: 1.25 INJECTION, POWDER, LYOPHILIZED, FOR SOLUTION INTRAVENOUS at 14:00

## 2022-07-14 ASSESSMENT — PAIN SCALES - GENERAL
PAINLEVEL_OUTOF10: 6
PAINLEVEL_OUTOF10: 4
PAINLEVEL_OUTOF10: 4
PAINLEVEL_OUTOF10: 7

## 2022-07-14 ASSESSMENT — PAIN DESCRIPTION - ORIENTATION: ORIENTATION: RIGHT

## 2022-07-14 ASSESSMENT — PAIN DESCRIPTION - DESCRIPTORS: DESCRIPTORS: BURNING;ACHING

## 2022-07-14 ASSESSMENT — ENCOUNTER SYMPTOMS
GASTROINTESTINAL NEGATIVE: 1
RESPIRATORY NEGATIVE: 1

## 2022-07-14 ASSESSMENT — PAIN DESCRIPTION - LOCATION: LOCATION: HIP

## 2022-07-14 ASSESSMENT — PAIN - FUNCTIONAL ASSESSMENT: PAIN_FUNCTIONAL_ASSESSMENT: ACTIVITIES ARE NOT PREVENTED

## 2022-07-14 NOTE — PLAN OF CARE
Problem: Discharge Planning  Goal: Discharge to home or other facility with appropriate resources  Outcome: Progressing     Problem: Safety - Adult  Goal: Free from fall injury  Outcome: Progressing  Flowsheets (Taken 7/14/2022 1331)  Free From Fall Injury: Based on caregiver fall risk screen, instruct family/caregiver to ask for assistance with transferring infant if caregiver noted to have fall risk factors     Problem: Skin/Tissue Integrity  Goal: Absence of new skin breakdown  Description: 1. Monitor for areas of redness and/or skin breakdown  2. Assess vascular access sites hourly  3. Every 4-6 hours minimum:  Change oxygen saturation probe site  4. Every 4-6 hours:  If on nasal continuous positive airway pressure, respiratory therapy assess nares and determine need for appliance change or resting period.   7/14/2022 1332 by Marques Zimmerman RN  Outcome: Progressing  7/14/2022 0122 by Patty Zaidi RN  Outcome: Progressing     Problem: ABCDS Injury Assessment  Goal: Absence of physical injury  7/14/2022 1332 by Marques Zimmerman RN  Outcome: Progressing  Flowsheets (Taken 7/14/2022 1331)  Absence of Physical Injury: Implement safety measures based on patient assessment  7/14/2022 0122 by Patty Zaidi RN  Outcome: Progressing     Problem: Nutrition Deficit:  Goal: Optimize nutritional status  Outcome: Progressing     Problem: Chronic Conditions and Co-morbidities  Goal: Patient's chronic conditions and co-morbidity symptoms are monitored and maintained or improved  Outcome: Progressing

## 2022-07-14 NOTE — PROGRESS NOTES
Physical Therapy Rehab Treatment Note  Facility/Department: [facility]  [facility]       NAME: Dheeraj Villatoro  : 1960 (88 y.o.)  MRN: [id number]  CODE STATUS: Full Code    Date of Service: 2022       Restrictions:  Restrictions/Precautions: Fall Risk  Position Activity Restriction  Other position/activity restrictions: L Rib fractures - 10 and 11 per patient    SUBJECTIVE:   Subjective: \"My pain is a little up today\"    Pain  Pain: 7/10 R LBP L knee pain pre session 4/10 post session    OBJECTIVE:   Outcomes Measures:  Hanson Balance Score: 40     Roll Left  Assistance Level: Independent  Roll Right  Assistance Level: Independent  Sit to Supine  Assistance Level: Independent  Supine to Sit  Assistance Level: Independent  Scooting  Assistance Level: Independent    Sit to Stand  Assistance Level: Modified independent  Stand to Sit  Assistance Level: Modified independent  Bed To/From Chair  Assistance Level: Supervision    Ambulation  Surface: Level surface; Uneven surface; Carpet  Device: Rollator  Distance: 100' x 2, 50' x 1  Activity: Within Unit  Activity Comments: Improved safety this a.m. initial gait trial completed with 2# ankle weights to improve foot clearance  Additional Factors: Verbal cues  Assistance Level: Supervision    PT Exercises  Exercise Treatment: Seated Exercises with 2# ankle weight: Long arc quads, marching, Side kicks, Ankle pumps, ball squeezes x 20     ASSESSMENT/PROGRESS TOWARDS GOALS:   Assessment  Assessment: Patient continues to progress towards gait transfer and HANSON goals. HANSON balance test score improved 40/56  Activity Tolerance: Patient tolerated treatment well  Discharge Recommendations: Continue to assess pending progress    Goals:  Long Term Goals  Long term goal 1: Indep bed mobility  Long term goal 2:  Indep transfers bed to chair with safest assistive device  Long term goal 3: Ambulate with rollator Indep on level and ramp surfaces >/= 50' to allow safe return home.  Long term goal 4: Pt will demonstrate improved score on Ugalde balance assessment 48/56 for decreased risk for falls. PLAN OF CARE/Safety:   Safety Devices  Type of Devices: All fall risk precautions in place; Chair alarm in place    Therapy Time:   Individual   Time In 930   Time Out 1030   Minutes 60     Minutes: 60  Transfer/Bed mobility training: 10  Gait trainin  Neuro re education: 15  Therapeutic ex: [address] Perez PTA, 22 at 12:11 PM

## 2022-07-14 NOTE — CARE COORDINATION
LSW met with pt and discussed HHC vs OP, pt is choosing to continue OP at [facility] AT [facility] in [place]. AFUAW also discussed transport with LCT to dialysis, pt stated that she has already called and confirmed transport to start again on 7/19. Pt stated that she had no questions or concerns at this time.  Electronically signed by STEPHENIE Denise, HIPOLITO on 7/14/2022 at 11:43 AM

## 2022-07-14 NOTE — PROGRESS NOTES
[facility] Pharmacokinetic Monitoring Service - Vancomycin    Consulting Provider: Cody Balderrama   Indication: infected dialysis cath  Target Concentration: Pre-Dialysis Concentration 15-20 mg/L  Day of Therapy: 14  Additional Antimicrobials: n/a    Pertinent Laboratory Values: Wt Readings from Last 1 Encounters:   07/14/22 200 lb 2.8 oz (90.8 kg)     Temp Readings from Last 1 Encounters:   07/14/22 98.1 °F (36.7 °C)     Recent Labs     07/14/22  0500   CREATININE 3.62*   WBC 8.4     Procalcitonin:   Procalcitonin   Date Value Ref Range Status   07/05/2022 0.40 (H) 0.00 - 0.15 ng/mL Final     Comment:     Suspected Sepsis:  Low likelihood of sepsis  <.50 ng/mL    Increased likelihood of sepsis 0.50-2.00 ng/mL  Antibiotics encouraged    High risk of sepsis/shock   >2.00 ng/mL  Antibiotics strongly encouraged    Suspected Lower Respiratory Tract Infections:  Low likelihood of bacterial infection  <0.24 ng/mL    Increased likelihood of bacterial infection >0.24 ng/mL  Antibiotics encouraged    With successful antibiotic therapy, PCT levels should decrease  rapidly. (Half-life of 24 to 36 hours.)    Procalcitonin values from samples collected within the first  6 hours of systemic infection may still be low. Retesting may be indicated. Values from day 1 and day 4 can be entered into the Change in  Procalcitonin Calculator to determine the patient's  Mortality Risk Prognosis  (www.[facility]-pct-calculator. "[employer]    In healthy neonates, plasma Procalcitonin (PCT) concentrations  increase gradually after birth, reaching peak values at about  24 hours of age then decrease to normal values below 0.5 ng/mL  by 48-72 hours of age. Pertinent Cultures:  Culture Date Source Results        MRSA Nasal Swab: N/A. Non-respiratory infection.     Recent vancomycin administrations                     vancomycin (VANCOCIN) 1,250 mg in dextrose 5 % 250 mL IVPB (ADDAVIAL) (mg) 1,250 mg New Bag 07/12/22 9707 Assessment:  Date/Time Current Dose Concentration Dialysis Session   7/14 1250 mg post HD 17.3 7/14     Plan:  Concentration-guided dosing due to renal impairment and intermittent hemodialysis   Current dosing regimen of 1250 mg IV post HD is therapeutic   Continue current dose of 1250 mg IV post HD  Vancomycin concentration ordered for 7/16 @ 0600, prior to HD session   Pharmacy will continue to monitor patient and adjust therapy as indicated    Thank you for the consult,  Que Richards, [facility]  7/14/2022 1:10 PM

## 2022-07-14 NOTE — PROGRESS NOTES
Physical Therapy Missed Treatment   Facility/Department: [facility] X186/[id number]    NAME: Mirta Santiago    : 1960 (57 y.o.)  MRN: [id number]    Account: [de-identified]  Gender: female    Patient unavailable to participate in therapy session secondary to patient off floor for testing. Missed 30 minutes at 1330.     Marin Murillo PTA, 22 at 3:32 PM

## 2022-07-14 NOTE — DISCHARGE INSTR - COC
Continuity of Care Form    Patient Name: Richard Cox   :  1960  MRN:  [id number]    Admit date:  2022  Discharge date:  ***    Code Status Order: Full Code   Advance Directives:      Admitting Physician:  Diane Perez DO  PCP: Josh White MD    Discharging Nurse: [facility] Unit/Room#: E970/P394-22  Discharging Unit Phone Number: ***    Emergency Contact:   Extended Emergency Contact Information  Primary Emergency Contact: [address] Phone: [phone]  Work Phone: [phone]  Mobile Phone: [phone]  Relation: Child    Past Surgical History:  Past Surgical History:   Procedure Laterality Date     SECTION      x1    COLONOSCOPY  2014    Dr. Anna Velazco      x1 Dr. Larry Queen, Dr Adriana Mckeon  08/10/2021    [facility]    DIAGNOSTIC CARDIAC CATH LAB PROCEDURE  10/02/2019    DIALYSIS CATHETER INSERTION Left 2022    Tunneled Symetrex 15.5F x 23cm hemodialysis catheter inserted by Dr. Alexis Schulz, TOTAL ABDOMINAL (CERVIX REMOVED)      one ovary intact, Dr Lupe Moss, menorrhagia    IR THROMBECTOMY PERCUT AVF  2022    IR THROMBECTOMY PERCUT AVF 2022 [facility] SPECIAL PROCEDURE    IR TUNNELED CATHETER PLACEMENT GREATER THAN 5 YEARS  2022    IR TUNNELED CATHETER PLACEMENT GREATER THAN 5 YEARS 6/3/2022 [facility] SPECIAL PROCEDURE    IR TUNNELED CATHETER PLACEMENT GREATER THAN 5 YEARS Left 2022    15.5 fr by 23 cm Symetrex dialysis catheter inserted by Dr Keesha Majano    IR TIMOED Alexis Wallis 5 YEARS  2022    IR TUNNELED CATHETER PLACEMENT GREATER THAN 5 YEARS 2022 [facility] SPECIAL PROCEDURE    SD TOTAL KNEE ARTHROPLASTY Left 2018    LEFT KNEE TOTAL KNEE ARTHROPLASTY, SHAYNA, NERVE BLOCK performed by Shannon Nayak MD at [address] [place], [place] [address]Third Floor Right     TOTAL KNEE ARTHROPLASTY  05/19/2016    Dr Jolie Bautista    TUNNELED VENOUS CATHETER PLACEMENT Right 07/01/2020    tunneled HD catheter per Dr Surya Louise       Immunization History:   Immunization History   Administered Date(s) Administered    COVID-19, PFIZER PURPLE top, DILUTE for use, (age 15 y+), 30mcg/0.3mL 01/25/2021, 02/15/2021    Hepatitis B 07/21/2020, 08/25/2020, 10/13/2020, 02/09/2021    Hepatitis B Adult (Heplisav-b) 03/15/2022, 04/12/2022    Influenza Vaccine, unspecified formulation 10/14/2016    Influenza Virus Vaccine 10/20/2014, 10/30/2015, 10/14/2016, 09/29/2017, 10/12/2018, 09/29/2020, 09/29/2020    Influenza Whole 10/20/2014    Influenza, MDCK Quadv, IM, PF (Flucelvax 2 yrs and older) 09/17/2021    Influenza, [place], IM, (6 mo and older Fluzone, Flulaval, Fluarix and 3 yrs and older Afluria) 09/29/2017, 10/12/2018    Influenza, Quadv, IM, PF (6 mo and older Fluzone, Flulaval, Fluarix, and 3 yrs and older Afluria) 10/03/2019    Influenza, Triv, 3 Years and older, IM (Afluria (5 yrs and older) 10/14/2016    Pneumococcal Conjugate 13-valent (Jibbnsl86) 10/30/2021    Pneumococcal Polysaccharide (Quysbuyds02) 10/15/2016, 09/08/2020    Tdap (Boostrix, Adacel) 08/03/2020       Active Problems:  Patient Active Problem List   Diagnosis Code    Atherosclerotic heart disease of native coronary artery with unspecified angina pectoris (HCC) I25.119    Schizophrenia, paranoid, chronic I16.7    Metabolic syndrome Z56.86    Vitamin B 12 deficiency E53.8    Cerebral microvascular disease I67.89    Mixed hyperlipidemia E78.2    Other hammer toe (acquired) M20.40    Vitamin D insufficiency E55.9    Incontinence of urine R32    Diabetic nephropathy with proteinuria (HCC) E11.21    Essential (primary) hypertension I10    History of type C viral hepatitis Z86.19    Urinary incontinence due to cognitive impairment R39.81    History of seizures Z87.898    Stented coronary artery-plan is to stay on Plavix indefinately per Dr Ramirez Signs Z95.5 Other specified diabetes mellitus with diabetic neuropathy, unspecified ([facility] E13.40    Controlled type 2 diabetes mellitus with diabetic neuropathy, with long-term current use of insulin ([facility]) E11.40, Z79.4    Hemiparesis, left ([facility]) G81.94    Angina, class II ([facility] I20.9    Pain, unspecified R52    Tardive dyskinesia G24.01    Shortness of breath R06.02    Uncontrolled type 2 diabetes mellitus with hyperglycemia ([facility]) E11.65    Chronic diastolic congestive heart failure ([facility]) I50.32    Sleep apnea, unspecified G47.30    Pulmonary hypertension, unspecified ([facility]) I27.20    Class 2 severe obesity with serious comorbidity and body mass index (BMI) of 36.0 to 36.9 in adult ([facility] E66.01, Z68.36    Edema R60.9    Closed supracondylar fracture of right humerus S42.411A    Other chronic pain G89.29    Palliative care patient Z51.5    Recurrent falls R29.6    Renal failure N19    Difficulty in walking R26.2    ESRD (end stage renal disease) on dialysis ([facility] N18.6, Z99.2    Weakness R53.1    Other seizures ([facility]) G40.89    Moderate persistent asthma without complication O93.90    AKQXO-46 U07.1    Post PTCA Z98.61    Falls frequently R29.6    Chest wall contusion, left, initial encounter S20.212A    Headache, unspecified R51.9    Paranoid schizophrenia ([facility] F20.0    Compression fracture of spine ([facility] M48.50XA    Closed rib fracture S22.39XA    Depression F32. A    Chronic obstructive pulmonary disease ([facility]) J44.9    Critical illness polyneuropathy ([facility]) G62.81    Multiple closed fractures of ribs of right side S22.41XA    Nonrheumatic mitral valve regurgitation I34.0    Nonrheumatic tricuspid valve regurgitation I36.1    Need for extended care facility Z78.9    Chronic pain of both shoulders M25.511, G89.29, M25.512    Hemodialysis-associated hypotension I95.3    Anginal chest pain at rest [facility]) I20.8    Chest pain R07.9    Unstable angina ([facility]) I20.0    NSTEMI (non-ST elevated myocardial infarction) (HCC) I21.4    CKD (chronic kidney disease) stage 4, GFR 15-29 ml/min ([facility]) N18.4    Hyperkalemia E87.5    Impaired mobility and activities of daily living dt polyneuropathy and reccent fall  Z74.09, Z78.9    Dialysis patient ([facility] Z99.2    Unspecified open wound of right upper arm, initial encounter S41.101A    Multiple closed fractures of right lower extremity and ribs S82.91XA, S22.41XA    Closed T11 fracture ([facility] S22.089A    Encephalopathy acute G93.40    MRSA bacteremia R78.81, B95.62    Sepsis due to Enterococcus [facility]) A41.81    Local infection due to central venous catheter T80.212A    DJD (degenerative joint disease), cervical M47.812    Closed head injury S09.90XA    Sarcopenia M62.84    Fall from standing W19. XXXA    Septicemia ([facility] A41.9    Chronic hepatitis C ([facility] B18.2    Catheter-related bloodstream infection T80.211A    Enterococcus faecalis infection B95.2    Cervical stenosis of spinal canal M48.02    Ataxic gait R26.0       Isolation/Infection:   Isolation            No Isolation          Patient Infection Status       Infection Onset Added Last Indicated Last Indicated By Review Planned Expiration Resolved Resolved By    None active    Resolved    COVID-19 (Rule Out) 06/30/22 06/30/22 06/30/22 COVID-19, Rapid (Ordered)   06/30/22 Rule-Out Test Resulted    C-diff Rule Out 04/20/22 04/20/22 04/20/22 Clostridium Difficile Toxin/Antigen (Ordered)   04/20/22 Rule-Out Test Resulted    COVID-19 (Rule Out) 01/12/22 01/12/22 01/12/22 COVID-19, Rapid (Ordered)   01/12/22 Rule-Out Test Resulted    COVID-19 (Rule Out) 12/22/21 12/22/21 12/22/21 COVID-19, Rapid (Ordered)   12/22/21 Rule-Out Test Resulted    COVID-19 (Rule Out) 02/21/21 02/21/21 02/21/21 Covid-19 Ambulatory (Ordered)   02/23/21 Abdulkadir Moody RN    Per Dr. Lazarus Moats no need for droplet plus isolation    COVID-19 02/16/21 02/17/21 02/16/21 COVID-19, Rapid   02/23/21 Abdulkadir Moody, RN    Isolation status removed per provider    C-diff Rule Out 01/20/21 21 Gastrointestinal Panel by DNA (Ordered)   21 Edmund Snider RN    Order discontinued    COVID-19  21 COVID-19   21     Positive 21. Electronically signed by Joe Gregorio RN on 21 at 7:22 AM EST       COVID-19 20 COVID-19   21 Joe Gregorio RN    COVID-19 (Rule Out) 20 COVID-19 (Ordered)   20 Rule-Out Test Resulted    COVID-19 (Rule Out) 20 COVID-19 (Ordered)   20 Rule-Out Test Resulted    COVID-19 (Rule Out) 20 COVID-19 (Ordered)   20 Rule-Out Test Resulted    COVID-19 (Rule Out) 20 COVID-19 (Ordered)   20 Rule-Out Test Resulted    COVID-19 (Rule Out) 20 COVID-19 (Ordered)   20 Rule-Out Test Resulted    Pt has been possitive on December            Nurse Assessment:  Last Vital Signs: BP (!) 123/59   Pulse 82   Temp 98.2 °F (36.8 °C)   Resp 18   Ht 5' 7\" (1.702 m)   Wt 194 lb 3.6 oz (88.1 kg)   LMP  (LMP Unknown)   SpO2 97%   BMI 30.42 kg/m²     Last documented pain score (0-10 scale): Pain Level: 7  Last Weight:   Wt Readings from Last 1 Encounters:   22 194 lb 3.6 oz (88.1 kg)     Mental Status:  {IP PT MENTAL STATUS:}    IV Access:  {[facility] ACCESS:480343852}    Nursing Mobility/ADLs:  Walking   {CHP DME FDAL:266943449}  Transfer  {CHP DME ZZZN:593675320}  Bathing  {CHP DME YP}  Dressing  {CHP DME XHLS:312237986}  Toileting  {CHP DME MGYM:624995706}  Feeding  {CHP DME YEG}  Med Admin  {CHP DME MCMW:640326207}  Med Delivery   { WILIAN MED Delivery:926191096}    Wound Care Documentation and Therapy:        Elimination:  Continence:    Bowel: {YES / RW:41027}  Bladder: {YES / SB:40296}  Urinary Catheter: {Urinary Catheter:498586934}   Colostomy/Ileostomy/Ileal Conduit: {YES / GI:47503}       Date of Last BM: ***    Intake/Output Summary (Last 24 hours) at 2022 1617  Last data filed at 2022 1533  Gross per 24 hour   Intake 400 ml   Output 3100 ml   Net -2700 ml     I/O last 3 completed shifts:   In: 240 [P.O.:240]  Out: -     Safety Concerns:     [address] Concerns:290877226}    Impairments/Disabilities:      [address] Impairments/Disabilities:818647819}    Nutrition Therapy:  Current Nutrition Therapy:   [address] WILIAN Diet List:330899704}    Routes of Feeding: {CHP DME Other Feedings:802428289}  Liquids: {Slp liquid thickness:82913}  Daily Fluid Restriction: {CHP DME Yes amt example:113117823}  Last Modified Barium Swallow with Video (Video Swallowing Test): {Done Not Done RIKR:816462364}    Treatments at the Time of Hospital Discharge:   Respiratory Treatments: ***  Oxygen Therapy:  {Therapy; copd oxygen:91598}  Ventilator:    { CC Vent RQTU:553550577}    Rehab Therapies: {THERAPEUTIC INTERVENTION:9279465585}  Weight Bearing Status/Restrictions: [facility] Weight Bearin}  Other Medical Equipment (for information only, NOT a DME order):  {EQUIPMENT:110959470}  Other Treatments: ***    Patient's personal belongings (please select all that are sent with patient):  {CHP DME Belongings:927508707}    RN SIGNATURE:  {Esignature:972714743}    CASE MANAGEMENT/SOCIAL WORK SECTION    Inpatient Status Date: 2022    Readmission Risk Assessment Score:  Readmission Risk              Risk of Unplanned Readmission:  47           Discharging to Facility/ Agency   Name: [facility]  Address:[address] [place] [address]   [phone]  Fax:    Dialysis Facility (if applicable)   Name:[facility] [place]  Address:[address] [place] [address]  Dialysis Schedule:   Phone:[phone]  Fax:    / signature: Electronically signed by Robert Willett RN on 22 at 4:24 PM EDT    PHYSICIAN SECTION    Prognosis: Good    Condition at Discharge: Stable    Rehab Potential (if transferring to Rehab): Good    Recommended Labs or Other Treatments After Discharge:  Dailysis as above    Physician Certification: I certify the above information and transfer of Polina Gardiner  is necessary for the continuing treatment of the diagnosis listed and that she requires Home Care for greater 30 days.      Update Admission H&P: No change in H&P    PHYSICIAN SIGNATURE:  Dr. Rissa Montemayor  Electronically signed by Didier Altamirano RN on 7/14/22 at 4:24 PM EDT

## 2022-07-14 NOTE — CONSULTS
[address]                      [facility], [address] [facility]                                  CONSULTATION    PATIENT NAME: Kenia Washington                  :        1960  MED REC NO:   [id number]                            ROOM:       R248  ACCOUNT NO:   [de-identified]                           ADMIT DATE: 2022  PROVIDER:     Brady Gomez MD    CONSULT DATE:  2022    ENDOCRINE CONSULTATION    REFERRING PROVIDER:  Fanny Donahue DO    REASON FOR CONSULTATION:  Management of uncontrolled type 2 diabetes. CHIEF COMPLAINT AND HISTORY OF PRESENT ILLNESS:  The patient is a  78-year-old female with known history of type 2 diabetes with multiple  complications, admitted recently to [facility]  because of fall, weakness and back pain in end of . Initial  admitting diagnoses were weakness with multiple falls, rib fracture. The patient has had history of end-stage renal disease on dialysis,  coronary artery disease, currently on Lantus 35 at night, Humalog medium  dose sliding scale. Blood sugars have been mostly in the 100-200 range,  occasionally lower blood sugars in the morning. Hemoglobin A1c was  reviewed from April, it was 6.7. A1c have been mostly below 7. Home  medication for diabetes includes NovoLog 8-10 units with each meals plus  sliding scale and Lantus 70 units at bedtime. She is taking a low dose  of insulin. The patient also seen here by neurologist, podiatrist,  cardiologist, and neurosurgeon. Infectious Disease has seen the patient  for sepsis with enterococcus infected dialysis catheter. Neurosurgeon  is seeing the patient for cervical canal stenosis. P.o. intake has been  fluctuating. Currently on dysphagia puree diet.     PAST MEDICAL HISTORY:  Significant for type 2 diabetes, coronary artery  disease, congestive heart failure, end-stage renal disease on dialysis,  history of heart failure, neurogenic urinary incontinence, history of  recurrent UTI. PAST SURGICAL HISTORY:  , colonoscopy, angioplasty with stent  placement, hysterectomy, thrombectomy. FAMILY HISTORY:  Cancer, diabetes, hypertension. PERSONAL AND SOCIAL HISTORY:  Denies any smoking. MEDICATIONS:  Here include Lantus 35 at night, Humalog sliding scale,  Abilify, Lipitor, Lasix, ProAmatine, vancomycin intermittent dose. ALLERGIES:  CODEINE and OXYCONTIN. REVIEW OF SYSTEMS:  Other than weakness, falls, fatigue, 14-point review  of systems is negative. PHYSICAL EXAMINATION:  GENERAL:  The patient is alert, awake, in no obvious distress, lying in  bed. VITAL SIGNS:  Blood pressure was 115/55, pulse rate was 80, respiratory  17, temperature was 98. 1. HEENT:  Normocephalic, atraumatic. Pupils equal and reactive to light. Oral mucosa was moist.  NECK:  Supple. Trachea in midline. CHEST:  Lungs were clear to auscultation bilaterally. No wheezing or  crackles were heard. CARDIOVASCULAR:  Heart sounds were normal.  ABDOMEN:  Soft, slightly obese. Bowel sounds are present. No  organomegaly or tenderness. EXTREMITIES:  Lower extremities reveal trace edema with chronic changes. MUSCULOSKELETAL:  No joint swelling. NEUROLOGIC:  Cranial nerves I through XII were intact. PSYCHIATRIC:  Depressed affect. SKIN:  No rashes, but chronic changes. LABORATORY DATA:  Sodium 132, potassium 4.7, chloride 91, CO2 was 26,  BUN 25, creatinine 3.43. Hemoglobin was 9.7. ASSESSMENT:  Type 2 diabetes with fluctuating but improving control,  end-stage renal disease on dialysis, coronary artery disease, falls,  weakness, cervical spinal stenosis, sepsis with enterococcus infected  dialysis catheter. PLAN:  Lower Lantus to 25 at night. Continue Humalog medium dose  coverage. Avoid hypoglycemia. We will also get a hemoglobin A1c. Blood sugar goal 140-200 range. Continue physical therapy.     Total time spent 55

## 2022-07-14 NOTE — PROGRESS NOTES
Nephrology Progress Note    Assessment:  ESRDX  DM type-2  OHDx CAD CABGX   Schizophrenia  Hx CVA  Tx VRE  Hc Hepatitis-C  Anemia      Plan:  Dialysis today    Patient Active Problem List:     Atherosclerotic heart disease of native coronary artery with unspecified angina pectoris (HCC)     Schizophrenia, paranoid, chronic     Metabolic syndrome     Vitamin B 12 deficiency     Cerebral microvascular disease     Mixed hyperlipidemia     Other hammer toe (acquired)     Vitamin D insufficiency     Incontinence of urine     Diabetic nephropathy with proteinuria ([address]     Essential (primary) hypertension     History of type C viral hepatitis     Urinary incontinence due to cognitive impairment     History of seizures     Stented coronary artery-plan is to stay on Plavix indefinately per Dr Nori Mcburney     Other specified diabetes mellitus with diabetic neuropathy, unspecified ([address]     Controlled type 2 diabetes mellitus with diabetic neuropathy, with long-term current use of insulin (HCC)     Hemiparesis, left (HCC)     Angina, class II ([address]     Pain, unspecified     Tardive dyskinesia     Shortness of breath     Uncontrolled type 2 diabetes mellitus with hyperglycemia (HCC)     Chronic diastolic congestive heart failure (HCC)     Sleep apnea, unspecified     Pulmonary hypertension, unspecified (HCC)     Class 2 severe obesity with serious comorbidity and body mass index (BMI) of 36.0 to 36.9 in [facility])     Edema     Closed supracondylar fracture of right humerus     Other chronic pain     Palliative care patient     Recurrent falls     Renal failure     Difficulty in walking     ESRD (end stage renal disease) on dialysis (HCC)     Weakness     Other seizures (HCC)     Moderate persistent asthma without complication     FDEVP-15     Post PTCA     Falls frequently     Chest wall contusion, left, initial encounter     Headache, unspecified     Paranoid schizophrenia ([address]     Compression fracture of spine ([address]     Closed rib fracture     Depression     Chronic obstructive pulmonary disease (HCC)     Critical illness polyneuropathy ([facility])     Multiple closed fractures of ribs of right side     Nonrheumatic mitral valve regurgitation     Nonrheumatic tricuspid valve regurgitation     Need for extended care facility     Chronic pain of both shoulders     Hemodialysis-associated hypotension     Anginal chest pain at rest [facility])     Chest pain     Unstable angina ([facility])     NSTEMI (non-ST elevated myocardial infarction) ([facility])     CKD (chronic kidney disease) stage 4, GFR 15-29 ml/min ([facility])     Hyperkalemia     Impaired mobility and activities of daily living dt polyneuropathy and reccent fall      Dialysis patient [facility])     Unspecified open wound of right upper arm, initial encounter     Multiple closed fractures of right lower extremity and ribs     Closed T11 fracture ([facility])     Encephalopathy acute     MRSA bacteremia     Sepsis due to Enterococcus ([address]     Local infection due to central venous catheter     DJD (degenerative joint disease), cervical     Closed head injury     Sarcopenia     Fall from standing     Septicemia ([address]     Chronic hepatitis C ([address]     Catheter-related bloodstream infection     Enterococcus faecalis infection     Cervical stenosis of spinal canal     Ataxic gait      Subjective:  Admit Date: 7/9/2022    Interval History: no issues    Medications:  Scheduled Meds:   insulin glargine  25 Units SubCUTAneous Nightly    furosemide  40 mg Oral Daily    traMADol  25 mg Oral 3 times per day    acetaminophen  500 mg Oral 3 times per day    Vitamin D  2,000 Units Oral Dinner    cyanocobalamin  1,000 mcg IntraMUSCular Weekly    coenzyme Q10  100 mg Oral Daily    ARIPiprazole  5 mg Oral Daily    aspirin EC  81 mg Oral Daily    atorvastatin  40 mg Oral Nightly    insulin lispro  0-12 Units SubCUTAneous TID WC    insulin lispro  0-6 Units SubCUTAneous Nightly    isosorbide mononitrate  120 mg Oral Daily    lidocaine  3 patch TransDERmal Daily    magnesium oxide  400 mg Oral Daily    melatonin  10 mg Oral Nightly    midodrine  5 mg Oral Once per day on Mon Wed Fri    polyethylene glycol  17 g Oral Daily    sertraline  50 mg Oral Nightly    vancomycin (VANCOCIN) intermittent dosing (placeholder)   Other RX Placeholder     Continuous Infusions:   dextrose         CBC:   Recent Labs     07/14/22  0500   WBC 8.4   HGB 9.2*        CMP:    Recent Labs     07/14/22  0500   *   K 5.6*   CL 93*   CO2 21   BUN 38*   CREATININE 3.62*   GLUCOSE 178*   CALCIUM 10.2*   LABGLOM 12.6*     Troponin: No results for input(s): TROPONINI in the last 72 hours. BNP: No results for input(s): BNP in the last 72 hours. INR: No results for input(s): INR in the last 72 hours. Lipids: No results for input(s): CHOL, LDLDIRECT, TRIG, HDL, AMYLASE, LIPASE in the last 72 hours. Liver: No results for input(s): AST, ALT, ALKPHOS, PROT, LABALBU, BILITOT in the last 72 hours. Invalid input(s): BILDIR  Iron:  No results for input(s): IRONS, FERRITIN in the last 72 hours. Invalid input(s): LABIRONS  Urinalysis: No results for input(s): UA in the last 72 hours.     Objective:  Vitals: /71   Pulse 77   Temp 98.2 °F (36.8 °C)   Resp 16   Ht 5' 7\" (1.702 m)   Wt 195 lb (88.5 kg)   LMP  (LMP Unknown)   SpO2 97%   BMI 30.54 kg/m²    Wt Readings from Last 3 Encounters:   07/09/22 195 lb (88.5 kg)   07/08/22 189 lb 9.5 oz (86 kg)   06/22/22 217 lb (98.4 kg)      24HR INTAKE/OUTPUT:  No intake or output data in the 24 hours ending 07/14/22 0838    General: alert, in no apparent distress  HEENT: normocephalic, atraumatic, anicteric  Neck: supple, no mass  Lungs: non-labored respirations, clear to auscultation bilaterally  Heart: regular rate and rhythm, no murmurs or rubs  Abdomen: soft, non-tender, non-distended  Ext: no cyanosis, no peripheral edema  Neuro: alert and oriented, no gross abnormalities  Psych: normal mood and affect  Skin: no rash      Electronically signed by Shasha Albert DO, MD

## 2022-07-14 NOTE — PROGRESS NOTES
Occupational Therapy  OCCUPATIONAL THERAPY  INPATIENT REHAB TREATMENT NOTE  [facility]      NAME: Sarah Picekring  : 1960 (82 y.o.)  MRN: [id number]  CODE STATUS: Full Code  Room: R248/R248-01    Date of Service: 2022    Referring Physician: Dr. Zeinab Bee  Rehab Diagnosis: impaired mobility and ADLs d/t polyneuropathy and recent fall    Restrictions  Restrictions/Precautions  Restrictions/Precautions: Fall Risk         Position Activity Restriction  Other position/activity restrictions: R Rib fractures - 10 and 11 per patient, dialysis port L chest, No Showering    Patient's date of birth confirmed: Yes    SAFETY:  Safety Devices  Safety Devices in place: Yes  Type of devices: All fall risk precautions in place    SUBJECTIVE:  Subjective: \" Its 3 1/2 hours. \"    Pain at start of treatment: Yes: 8/10    Pain at end of treatment: Yes: 8/10    Location: L knee  Nursing notified: Yes  RN: Odette Fish  Intervention: RN provided pain medication    COGNITION:  Orientation  Overall Orientation Status: Within Normal Limits  Orientation Level: Oriented to place;Oriented to time;Oriented to person;Oriented to situation  Cognition  Overall Cognitive Status: WFL      Pt's current cognitive status is:  Comprehension: Independent  Expression: Independent  Social Interaction: Independent  Problem Solving:  Mod I  Memory: Independent    OBJECTIVE:         Feeding  Assistance Level: Modified independent  Grooming/Oral Hygiene  Assistance Level: Modified independent  Upper Extremity Bathing  Assistance Level: Minimal assistance  Lower Extremity Bathing  Assistance Level: Minimal assistance  Skilled Clinical Factors: thoroughness with buttocks area- pt improved with belly folds and perineal folds anteriorly this day per discussion and training yesterday  Upper Extremity Dressing  Assistance Level: Modified independent  Lower Extremity Dressing  Assistance Level: Stand by assist  Skilled Clinical Factors: SBA for pant management; discussed pt having daughter bring in a couple pull ups to practice with for accurate  Putting On/Taking Off Footwear  Assistance Level: Minimal assistance;Verbal cues  Skilled Clinical Factors: needed assist with L sock d/t knee- pt did not want to use sock aide d/t it making her fatigued; donned shoes with Sup/Mod Ind  Tub/Shower Transfers  Skilled Clinical Factors: sponge bath today d/t dialysis port-L chest         Functional Mobility  Device: 4-Wheeled walker  Activity: To/From bathroom; Retrieve items;Transport items  Assistance Level: Stand by assist  Skilled Clinical Factors: pt completed in room mobility with SBA/Sup with 4 w/w completing item retrieval and transport to bathroom; provided vcs to slow pacing d/t walking very rigid from L knee pain  Supine to Sit  Assistance Level: Modified independent  Skilled Clinical Factors: vc's to watch feet in blanket d/t catching 1 or both of them in the blankets/sheets when attempting to transition from supine to EOB  Scooting  Assistance Level: Modified independent  Sit to Stand  Assistance Level: Stand by assist  Stand to Sit  Assistance Level: Stand by assist                      Education: pacing during mobility with rollator       Equipment recommendations:n/a         ASSESSMENT:  Assessment: vc's for improved participation/expectations and pacing for mobility with 4 w/w through room d/t L knee pain/stiffness to decrease risk of falls  Activity Tolerance: Patient tolerated treatment well      PLAN OF CARE:  Strengthening,Balance training,Functional mobility training,Endurance training,Safety education & training,Patient/Caregiver education & training,Equipment evaluation, education, & procurement,Self-Care / ADL,Home management training  continue with current POC until discharge of 7-17-22    Patient goals : work on strengthening my arms and legs, and improve balance so I don't fall           Therapy Time:   Individual Group Co-Treat   Time In 0830 Time Out 0930         Minutes 60                   ADL/IADL trainin minutes     Electronically signed by:     MARY Werner,   2022, 9:28 AM

## 2022-07-14 NOTE — PROGRESS NOTES
Subjective: The patient complains of  moderate to severe acute      partially relieved by rest, PT, OT, rest, pain medications and exacerbated by recent illness and exertion. Continues to have pain from her multiple rib fractures and is recovering from bilateral pleural effusions and balancing cardiac with renal risks. Nephrology is consulting and there titrating Lasix with caution. She is getting hemodialysis. I am concerned about patients medical complexities and current active problems and barriers to progress including hx of hep C and paranoid schitzophrenia. He is hoping to trial Bengay and heating pad for her achy muscles. Patient is getting nauseated when she is due for her dialysis due to toxic meds tabulate built up. She is responding to dialysis and Tigan. We also discussed her renal osteodystrophy pain and scheduling her Ultram every 8 hours she is in agreement. She does not feel that she is on the right dose of insulin and we will consult Dr. Aneta Boeck. Podiatry was consulted for long toenails and ichthyosis. I have adjusted her vancomycin level checks. She is for hemodialysis. Dr. Codie Kat and nephrology will work on her Lasix dose. They are balancing her renal and cardiac issues. He is hoping to have home health care at discharge and then transition to outpatient therapy. I discussed current functional, rehabilitation, medical status with other rehabilitation providers including nursing and case management. According to recent nursing note, \" Patient continues to be inpatient at [facility] [place]. ACM will sign off at this time as patient unable to engage in [facility] program.\".    ROS x10: The patient also complains of severely impaired mobility and activities of daily living. Otherwise no new problems with vision, hearing, nose, mouth, throat, dermal, cardiovascular, GI, , pulmonary, musculoskeletal, psychiatric or neurological. See Rehab H&P on Rehab chart dated .        Vital signs:  BP 135/71   Pulse 77   Temp 98.2 °F (36.8 °C)   Resp 16   Ht 5' 7\" (1.702 m)   Wt 195 lb (88.5 kg)   LMP  (LMP Unknown)   SpO2 97%   BMI 30.54 kg/m²   I/O:   PO/Intake:  fair PO intake, no problems observed or reported. Bowel/Bladder:  incontinent,Purwic constipation and urinary urgency. Last BM 7/9/22. General:  Patient is well developed, adequately nourished, non-obese and     well kempt. HEENT:    PERRLA, hearing intact to loud voice, external inspection of ear     and nose benign. Inspection of lips, tongue and gums  No teeth  Musculoskeletal: No significant change in strength or tone. All joints stable. Inspection and palpation of digits and nails show no clubbing,       cyanosis or inflammatory conditions. Neuro/Psychiatric: Affect: flat. Alert and oriented to person, place and     situation. No significant change in deep tendon reflexes or     Sensation    Lungs:  Diminished, CTA-B. Respiration effort is normal at rest.   fractured right 10-11 th ribs. tunneled Vascath left upper    chest with 2 ports. Heart:   S1 = S2, RRR. No loud murmurs. Abdomen:  Soft, non-tender, no enlargement of liver or spleen. Extremities:  Trace lower extremity edema,    tenderness. dialysis fistula graft to right upper arm that is not in use  Skin:   Intact   - discolored and bruised . ruise to back of right shoulder and right knee. Small sores noted to arms x 3     Rehabilitation:  Physical therapy: FIMS:  Bed Mobility: Scooting: Stand by assistance    Transfers: Sit to Stand: Stand by assistance  Stand to sit: Stand by assistance  Bed to Chair: Stand by assistance, Ambulation  Surface: level tile  Device: Rollator  Assistance: Stand by assistance  Quality of Gait: wide JAKE  Gait Deviations: Slow Tere,Decreased step length,Decreased step height  Distance: 60ft- pt exhibiting fatigue with this distance,      FIMS:  ,  , Assessment: Pt is 57 y.o. female to hospital due to septicemia.   Pt exhibits decreased strength, balance, posture and increased pain which increases pt need for assistance with mobility to decreased risk for falls at home. Continued PT is required for safe d/c home with family. Occupational therapy: FIMS:   ,  , Assessment: Pt is a 58 y/o female who presents to [facility] for medical decline with fall. Pt lives with dtr who assists with ADLs and IADLs PTA. Pt presents with above deficits and impaired ADL status.  Pt may benefit from skilled OT intervention for increased indepdnence and safety upon d/c to home    Speech therapy: FIMS:        Lab/X-ray studies reviewed, analyzed and discussed with patient and staff:   Recent Results (from the past 24 hour(s))   POCT Glucose    Collection Time: 07/13/22 11:35 AM   Result Value Ref Range    POC Glucose 190 (H) 70 - 99 mg/dl    Performed on ACCU-CHEK    POCT Glucose    Collection Time: 07/13/22  4:15 PM   Result Value Ref Range    POC Glucose 253 (H) 70 - 99 mg/dl    Performed on ACCU-CHEK    POCT Glucose    Collection Time: 07/13/22  8:48 PM   Result Value Ref Range    POC Glucose 149 (H) 70 - 99 mg/dl    Performed on ACCU-CHEK    Vancomycin Level, Random    Collection Time: 07/14/22  5:00 AM   Result Value Ref Range    Vancomycin Rm 17.3 10.0 - 40.0 ug/mL   CBC with Auto Differential    Collection Time: 07/14/22  5:00 AM   Result Value Ref Range    WBC 8.4 4.8 - 10.8 K/uL    RBC 2.94 (L) 4.20 - 5.40 M/uL    Hemoglobin 9.2 (L) 12.0 - 16.0 g/dL    Hematocrit 26.9 (L) 37.0 - 47.0 %    MCV 91.3 82.0 - 100.0 fL    MCH 31.2 27.0 - 31.3 pg    MCHC 34.2 33.0 - 37.0 %    RDW 14.1 11.5 - 14.5 %    Platelets 059 549 - 768 K/uL    Neutrophils % 70.9 %    Lymphocytes % 13.7 %    Monocytes % 9.3 %    Eosinophils % 5.1 %    Basophils % 1.0 %    Neutrophils Absolute 6.0 1.4 - 6.5 K/uL    Lymphocytes Absolute 1.2 1.0 - 4.8 K/uL    Monocytes Absolute 0.8 0.2 - 0.8 K/uL    Eosinophils Absolute 0.4 0.0 - 0.7 K/uL    Basophils Absolute 0.1 0.0 - 0.2 K/uL   Basic Metabolic Panel    Collection Time: 07/14/22  5:00 AM   Result Value Ref Range    Sodium 127 (L) 135 - 144 mEq/L    Potassium 5.6 (H) 3.4 - 4.9 mEq/L    Chloride 93 (L) 95 - 107 mEq/L    CO2 21 20 - 31 mEq/L    Anion Gap 13 9 - 15 mEq/L    Glucose 178 (H) 70 - 99 mg/dL    BUN 38 (H) 8 - 23 mg/dL    CREATININE 3.62 (H) 0.50 - 0.90 mg/dL    GFR Non-African American 12.6 (L) >60    GFR  15.3 (L) >60    Calcium 10.2 (H) 8.5 - 9.9 mg/dL   POCT Glucose    Collection Time: 07/14/22  5:55 AM   Result Value Ref Range    POC Glucose 189 (H) 70 - 99 mg/dl    Performed on ACCU-CHEK        Previous extensive, complex labs, notes and diagnostics reviewed and analyzed. ALLERGIES:    Allergies as of 07/09/2022 - Fully Reviewed 06/30/2022   Allergen Reaction Noted    Codeine Hives 12/09/2011    Oxycontin [oxycodone hcl] Hives 06/15/2020      (please also verify by checking [facility])       Complex Physical Medicine & Rehab Issues Assess & Plan:   1. Severe abnormality of gait and mobility and impaired self-care and ADL's secondary to progressive weakness dt OA flareup and  Polyneuropathy . Functional and medical status reassessed regarding patients ability to participate in therapies and patient found to be able to participate in acute intensive comprehensive inpatient rehabilitation program including PT/OT to improve balance, ambulation, ADLs, and to improve the P/AROM. Therapeutic modifications regarding activities in therapies, place, amount of time per day and intensity of therapy made daily. In bed therapies or bedside therapies prn.   2. Bowel progressive constipation, and Bladder dysfunction monitoring neurogenic bladder,   Incontinence of urine:  frequent toileting, ambulate to bathroom with assistance, check post void residuals. Check for C.difficile x1 if >2 loose stools in 24 hours, continue bowel & bladder program.  Monitor bowel and bladder function. Lactinex 2 PO every AC.   MOM prn, [employer] Problems:  1. Chronic CHF, CKD (chronic kidney disease) stage 4, GFR 15-29 ml/min,   ESRD (end stage renal disease) on dialysis, Patient is getting nauseated when she is due for her dialysis due to toxic meds tabulate built up. She is responding to dialysis and Tigan. We also discussed her renal osteodystrophy pain and scheduling her Ultram every 8 hours she is in agreement. Consult nephrology. Titrate Lasix-balance cardiac and renal risk  2. Closed T11 fracture renal osteodystrophy,   Multiple closed fractures of ribs of right side,   Chest wall contusion, left -Lidoderm, vitamin D, abdominal binder as tolerated  3. Encephalopathy acute,   Cerebral microvascular disease-limit toxic medications  4. MRSA bacteremia with Septicemia -IV vancomycin consult pharmacy for dosing monitor renal function and adjust dose. 5.   Chronic hepatitis C-eliminate toxic medications  6. Vitamin B 12 deficiency, Vitamin D insufficiency-Add high-dose vitamin D, recheck vitamin D level out after discharge,  7. Now with low blood pressure but history of essential (primary) hypertension,   Chronic diastolic congestive heart failure-Acute rehab to monitor heart rate and rhythm with the option of telemetry and the effects of chronotropic medication with respect to increasing physical activity and exercise in PT, OT, ADLs with medication titration to lowest effective dosing. Continue blood signs every shift focusing on heart rate, rhythm and blood pressure checks with orthostatic checks-monitoring the effect of exercise, therapy and posture. Consult hospitalist for backup medical and adjust/add medications (aspirin, Imdur, Lipitor, ProAmatine). Monitor heart rate and blood pressure as well as medications effects on vital signs before during and after therapy with especial focus on preventing orthostasis and falls risk.   8.   Controlled type 2 diabetes mellitus with diabetic neuropathy, with long-term current use of insulin-Continue blood sugar checks every shift, diet, add diabetic add dietary education, restrict carbohydrates to lowest effective and safe carb count per meal advising 4 carbs per meal, add at bedtime snack to prevent a.m. hypoglycemia, adjust/add medications (Lantus, sliding scale insulin)   9. Pulmonary hypertension -Acute rehab for endurance traing with Pulse Ox to monitoring oxygen saturation and heart rate with O2 titration to lowest effective dose. Pulse oximeter checks to shift and at HS to dose and titrate oxygen and aerosol treatments monitor for nocturnal hypoxemia, monitor vital signs, oxygen prn. Focus on energy conservation. 10.   Paranoid schizophrenia -emotional support provided daily, vitamin B12, encourage participation in rehabilitation support group and recreational therapy, adjust/add medications -patient had been on Zoloft at home. Focus of today's plan-  Initiate and modify therapuetic plan to meet patients individual needs, add rest breaks as needed  -consider resuming SSRI. Patient is getting nauseated when she is due for her dialysis due to toxic meds tabulate built up. Begin to set up home health care transitioning to outpatient therapy.       Lena Ritchie D.O., PM&R     Attending    [address]

## 2022-07-14 NOTE — PROGRESS NOTES
Infectious Disease     Patient Name: Jatin Bates  Date: 7/14/2022  YOB: 1960  Medical Record Number: [id number]        Sepsis with Enterococcus  Infected dialysis cath     mitral valve regurgitation  coronary artery bypass graft x1 vessel with tricuspid valve repair on 1/21/2022      Blood cultures from admission 2 sets growing gram-positive cocci in chains  Identified as Enterococcus faecalis by PCR  Patient currently on vancomycin            Review of Systems   Constitutional: Negative. Negative for chills, diaphoresis, fatigue and fever. Respiratory: Negative. Cardiovascular: Negative. Gastrointestinal: Negative. Physical Exam  Cardiovascular:      Heart sounds: Normal heart sounds. No murmur heard. Pulmonary:      Effort: Pulmonary effort is normal. No respiratory distress. Breath sounds: Normal breath sounds. No wheezing, rhonchi or rales. Abdominal:      General: Abdomen is flat. Bowel sounds are normal. There is no distension. Palpations: Abdomen is soft. There is no mass. Tenderness: There is no abdominal tenderness. There is no guarding. Blood pressure 135/71, pulse 77, temperature 98.2 °F (36.8 °C), resp. rate 16, height 5' 7\" (1.702 m), weight 195 lb (88.5 kg), SpO2 97 %, not currently breastfeeding.       .   Lab Results   Component Value Date    WBC 8.4 07/14/2022    HGB 9.2 (L) 07/14/2022    HCT 26.9 (L) 07/14/2022    MCV 91.3 07/14/2022     07/14/2022     Lab Results   Component Value Date/Time     07/14/2022 05:00 AM    K 5.6 07/14/2022 05:00 AM    K 4.3 07/04/2022 03:07 AM    CL 93 07/14/2022 05:00 AM    CO2 21 07/14/2022 05:00 AM    BUN 38 07/14/2022 05:00 AM    CREATININE 3.62 07/14/2022 05:00 AM    GLUCOSE 178 07/14/2022 05:00 AM    GLUCOSE 419 07/30/2021 08:16 AM    CALCIUM 10.2 07/14/2022 05:00 AM         7/1/22 1613   Culture Catheter Tip Culture, Catheter Tip:  NO GROWTH   Performed at [address] [place] [address] [address]   ([phone]        6/30/22 4105   Culture, Blood Id Sensitivity  Abnormal   Cult,Blood:  POSITIVE Blood Culture   Performed at [address]   ([phone]   P      Organism Enterococcus faecalis Abnormal  P    Resulting Agency [address] [facility]          Susceptibility      Enterococcus faecalis (2)    Antibiotic Interpretation Microscan  Method Status    ampicillin Sensitive <=2 mcg/mL BACTERIAL SUSCEPTIBILITY PANEL BY DIYA     vancomycin Sensitive 1 mcg/mL BACTERIAL SUSCEPTIBILITY PANEL BY DIYA                   7/1/22 1613   Culture Catheter Tip Culture, Catheter Tip:  NO GROWTH          ASSESSMENT:  PLAN:    Sepsis with Enterococcus  Infected dialysis cath   vancomycin x 2week

## 2022-07-14 NOTE — FLOWSHEET NOTE
07/14/22 1533   Vital Signs   BP (!) 122/58   Temp 98.2 °F (36.8 °C)   Heart Rate 83   Resp 18   Weight 194 lb 3.6 oz (88.1 kg)   Percent Weight Change -2.97   Pain Assessment   Pain Assessment 0-10   Post-Hemodialysis Assessment   Post-Treatment Procedures Blood returned;Catheter capped, clamped and heparinized x 2 ports   Machine Disinfection Process Acid/Vinegar Clean;Heat Disinfect; Exterior Machine Disinfection   Rinseback Volume (ml) 400 ml   Blood Volume Processed (Liters) 69.1 l/min   Dialyzer Clearance Lightly streaked   Duration of Treatment (minutes) 3.5 minutes   Hemodialysis Intake (ml) 400 ml   Hemodialysis Output (ml) 3100 ml   NET Removed (ml) 2700   Tolerated Treatment Good   Patient Response to Treatment pt tolerated tx w/o complications, post report to antonio gaxiola    Bilateral Breath Sounds Diminished   Edema Generalized   Edema Generalized +1   RLE Edema +1   LLE Edema +1   Time Off 1533   Patient Disposition Return to room

## 2022-07-14 NOTE — PROGRESS NOTES
Comprehensive Nutrition Assessment    Type and Reason for Visit:  Reassess    Nutrition Recommendations/Plan:   Modify Current Diet (2 gm Na+, add low K+ restriction),Discontinue Oral Nutrition Supplement     Malnutrition Assessment:  Malnutrition Status:  Severe malnutrition (07/10/22 1322)    Context:  Chronic Illness    Nutrition Assessment:    Pt continues to improve from a nutrition standpoint with verbalized good appetite and noted good intake at meals (%). To discontinue nutritional supplement at this time. Nutrition Related Findings:    Pmhx: ESRD with HD T/Th/Sat, CHF, CAD, COPD, DM, GERD, toe amputation; adm s/p fall with rib fractures. Diet advanced to soft and bite sized 7/8. Labs (7/14): Oc=589; K=5.6, Bun/Cr=38/3.62; Gluc=114-235 x last 3 days. Last BM noted 7/14. Trace gen edema noted. Wound Type: Surgical Incision       Current Nutrition Intake & Therapies:    Average Meal Intake: %  ADULT DIET; Dysphagia - Soft and Bite Sized; 3 carb choices (45 gm/meal); No Added Salt (3-4 gm); 1800 ml  ADULT ORAL NUTRITION SUPPLEMENT; Breakfast, Lunch, Dinner; Diabetic Oral Supplement    Anthropometric Measures:  Height: 5' 7\" (170.2 cm)  Ideal Body Weight (IBW): 135 lbs (61 kg)    Admission Body Weight: 217 lb (98.4 kg) (stated 6/30)  Current Body Weight: 200 lb (90.7 kg) (7/14),   Weight Source: Bed Scale  Current BMI (kg/m2): 31.3  Usual Body Weight: 230 lb (bed 1/13/22); 218lb 14oz (6/2/22)  % Weight Change (Calculated): -15.2  Weight Adjustment For: No Adjustment                 BMI Categories: Obese Class 1 (BMI 30.0-34. 9)    Estimated Daily Nutrient Needs:  Energy Requirements Based On: Kcal/kg  Weight Used for Energy Requirements: Current  Energy (kcal/day): 5288-1767 (kg x 15-17)  Weight Used for Protein Requirements: Ideal  Protein (g/day): 73-79g (1.2-1.3g/kg)  Method Used for Fluid Requirements: Standard Renal    Nutrition Diagnosis:   · Altered nutrition-related lab values related to renal dysfunction,endocrine dysfuntion as evidenced by lab values    Nutrition Interventions:   Food and/or Nutrient Delivery: Modify Current Diet,Discontinue Oral Nutrition Supplement  Nutrition Education/Counseling: No recommendation at this time  Coordination of Nutrition Care: Continue to monitor while inpatient       Goals:     Goals: PO intake 75% or greater  Specify Other Goals: Gluc< 160    Nutrition Monitoring and Evaluation:   Behavioral-Environmental Outcomes: None Identified  Food/Nutrient Intake Outcomes: Food and Nutrient Intake  Physical Signs/Symptoms Outcomes: Biochemical Data,Weight,Chewing or Swallowing    Carley Cruz RD, LD

## 2022-07-14 NOTE — PROGRESS NOTES
Hospitalist Progress Note      PCP: Jessie Schroeder MD    Date of Admission: 7/9/2022    Subjective:   57 F h/o ESRD on dialysis  Presented on 6/30 with fall, weakness and back pain. Found to have Enterococcus bacteremia  Discharged to rehab on 7/9    Today patient has no complaints. Medications:  Reviewed    Infusion Medications    dextrose       Scheduled Medications    insulin glargine  25 Units SubCUTAneous Nightly    furosemide  40 mg Oral Daily    traMADol  25 mg Oral 3 times per day    acetaminophen  500 mg Oral 3 times per day    Vitamin D  2,000 Units Oral Dinner    cyanocobalamin  1,000 mcg IntraMUSCular Weekly    coenzyme Q10  100 mg Oral Daily    ARIPiprazole  5 mg Oral Daily    aspirin EC  81 mg Oral Daily    atorvastatin  40 mg Oral Nightly    insulin lispro  0-12 Units SubCUTAneous TID WC    insulin lispro  0-6 Units SubCUTAneous Nightly    isosorbide mononitrate  120 mg Oral Daily    lidocaine  3 patch TransDERmal Daily    magnesium oxide  400 mg Oral Daily    melatonin  10 mg Oral Nightly    midodrine  5 mg Oral Once per day on Mon Wed Fri    polyethylene glycol  17 g Oral Daily    sertraline  50 mg Oral Nightly    vancomycin (VANCOCIN) intermittent dosing (placeholder)   Other RX Placeholder     PRN Meds: miconazole, heparin (porcine), trimethobenzamide, melatonin, acetaminophen **OR** acetaminophen, albuterol, camphor-menthol-methyl salicylate, dextrose, dextrose bolus **OR** dextrose bolus, glucagon (rDNA), glucose, hydrOXYzine HCl, polyethylene glycol    No intake or output data in the 24 hours ending 07/14/22 1105    Exam:    /71   Pulse 77   Temp 98.2 °F (36.8 °C)   Resp 16   Ht 5' 7\" (1.702 m)   Wt 195 lb (88.5 kg)   LMP  (LMP Unknown)   SpO2 97%   BMI 30.54 kg/m²     General appearance: No apparent distress  TDC in the left chest.  HEENT: Conjunctivae/corneas clear. Neck: Supple, with full range of motion.   Respiratory:  Normal respiratory effort. Clear to auscultation, bilaterally without Rales/Wheezes/Rhonchi. Cardiovascular: Regular rate and rhythm with normal S1/S2 without murmurs, rubs or gallops. Abdomen: Soft, non-tender, non-distended with normal bowel sounds. Musculoskeletal: No clubbing, cyanosis or edema bilaterally. AVF in the right upper extremity with thrill. Skin: Skin color, texture, turgor normal.  No rashes or lesions. Neuro: Non Focal.  Alert, moving all extremities. Labs:   Recent Labs     07/14/22  0500   WBC 8.4   HGB 9.2*   HCT 26.9*        Recent Labs     07/14/22  0500   *   K 5.6*   CL 93*   CO2 21   BUN 38*   CREATININE 3.62*   CALCIUM 10.2*     No results for input(s): AST, ALT, BILIDIR, BILITOT, ALKPHOS in the last 72 hours. No results for input(s): INR in the last 72 hours. No results for input(s): Angela Horn in the last 72 hours. Urinalysis:      Lab Results   Component Value Date/Time    NITRU Negative 07/10/2022 04:51 AM    WBCUA 0-2 07/10/2022 04:51 AM    BACTERIA Negative 07/10/2022 04:51 AM    RBCUA 3-5 07/10/2022 04:51 AM    BLOODU Negative 07/10/2022 04:51 AM    SPECGRAV 1.009 07/10/2022 04:51 AM    GLUCOSEU 100 07/10/2022 04:51 AM       Radiology:  No orders to display           Assessment/Plan:    Active Hospital Problems    Diagnosis Date Noted    Cervical stenosis of spinal canal [M48.02]      Priority: Medium    Ataxic gait [R26.0]      Priority: Medium    Chronic hepatitis C ([facility] [B18.2] 07/10/2022     Priority: Medium    Catheter-related bloodstream infection [T80.211A]      Priority: Medium    Enterococcus faecalis infection [B95.2]      Priority: Medium    Septicemia ([facility] [A41.9] 07/09/2022     Priority: Medium    Fall from standing [W19. XXXA]      Priority: Medium    MRSA bacteremia [R78.81, B95.62] 07/01/2022     Priority: Medium    Closed T11 fracture ([facility] [A27.123H] 06/30/2022     Priority: Medium    Encephalopathy acute [G93.40] 06/30/2022 Priority: Medium    Impaired mobility and activities of daily living dt polyneuropathy and reccent fall  [Z74.09, Z78.9] 06/30/2022     Priority: Medium    CKD (chronic kidney disease) stage 4, GFR 15-29 ml/min ([address] [N18.4] 04/23/2022     Priority: Medium    Chronic pain of both shoulders [M25.511, G89.29, M25.512] 08/05/2021    Multiple closed fractures of ribs of right side [S22.41XA] 04/05/2021    Chest wall contusion, left, initial encounter Ace Yoo 02/18/2021    ESRD (end stage renal disease) on dialysis ([address] [N18.6, Z99.2] 07/03/2020    Essential (primary) hypertension [I10] 07/03/2020    Paranoid schizophrenia ([address] [F20.0] 07/03/2020    Pulmonary hypertension, unspecified ([address] [I27.20] 11/05/2019    Chronic diastolic congestive heart failure ([address] [I50.32] 10/04/2019    Uncontrolled type 2 diabetes mellitus with hyperglycemia (HCC) [E11.65]     Controlled type 2 diabetes mellitus with diabetic neuropathy, with long-term current use of insulin ([address] [E11.40, Z79.4] 02/24/2017    Incontinence of urine [R32] 04/07/2014    Vitamin D insufficiency [E55.9] 02/04/2014    Cerebral microvascular disease [I67.89] 06/05/2013    Vitamin B 12 deficiency [E53.8] 06/05/2013     Enterococcus bacteremia -NOELLE negative for endocarditis. on IV vancomycin with dialysis. Management per ID    ESRD on HD -dialysis per nephrology. She has a TDC placed on 7/7 due to clotted graft    Spinal stenosis -MRI of the C-spine showed cervical spondylosis and moderate to severe spinal stenosis from C4-5 through C6-7. Conservative management per neurology and neurosurgery. CAD s/p CABG/PCI  - on ASA, Lipitor, Imdur     IDDM2 with hyperglycemia  -Endocrine following.  -Lantus 25 units at night and sliding scale insulin  -Hemoglobin A1c is 6     Anemia  - 2/2 ESRD  -Hemoglobin is stable     Schizophrenia  - on Abilify, zoloft    Acute right 10th and 11th rib fractures -patient denies pain.     Deconditioning, weakness-continue rehab. Additional work up or/and treatment plan may be added today or then after based on clinical progression. I am managing a portion of pt care. Some medical issues are handled by other specialists. Additional work up and treatment should be done in out pt setting by pt PCP and other out pt providers. In addition to examining and evaluating pt, I spent additional time explaining care, normal and abnormal findings, and treatment plan. All of pt questions were answered. Counseling, diet and education were  provided. Case will be discussed with nursing staff when appropriate. Family will be updated if and when appropriate. Diet: ADULT ORAL NUTRITION SUPPLEMENT; Breakfast, Lunch, Dinner; Diabetic Oral Supplement  ADULT DIET; Dysphagia - Soft and Bite Sized; 3 carb choices (45 gm/meal);  No Added Salt (3-4 gm); 1800 ml    Code Status: Full Code        Electronically signed by Kyra Garza MD on 7/14/2022 at 11:05 AM

## 2022-07-15 PROBLEM — M48.062 LUMBAR STENOSIS WITH NEUROGENIC CLAUDICATION: Status: ACTIVE | Noted: 2022-07-15

## 2022-07-15 PROBLEM — Z91.81 FALLS INFREQUENTLY: Status: ACTIVE | Noted: 2022-07-15

## 2022-07-15 LAB
GLUCOSE BLD-MCNC: 118 MG/DL (ref 70–99)
GLUCOSE BLD-MCNC: 154 MG/DL (ref 70–99)
GLUCOSE BLD-MCNC: 154 MG/DL (ref 70–99)
GLUCOSE BLD-MCNC: 171 MG/DL (ref 70–99)
PERFORMED ON: ABNORMAL

## 2022-07-15 PROCEDURE — 97110 THERAPEUTIC EXERCISES: CPT

## 2022-07-15 PROCEDURE — 6370000000 HC RX 637 (ALT 250 FOR IP): Performed by: INTERNAL MEDICINE

## 2022-07-15 PROCEDURE — 97129 THER IVNTJ 1ST 15 MIN: CPT

## 2022-07-15 PROCEDURE — 1180000000 HC REHAB R&B

## 2022-07-15 PROCEDURE — 99231 SBSQ HOSP IP/OBS SF/LOW 25: CPT | Performed by: INTERNAL MEDICINE

## 2022-07-15 PROCEDURE — 99233 SBSQ HOSP IP/OBS HIGH 50: CPT | Performed by: PSYCHIATRY & NEUROLOGY

## 2022-07-15 PROCEDURE — 97116 GAIT TRAINING THERAPY: CPT

## 2022-07-15 PROCEDURE — 97530 THERAPEUTIC ACTIVITIES: CPT

## 2022-07-15 PROCEDURE — APPSS15 APP SPLIT SHARED TIME 0-15 MINUTES: Performed by: NURSE PRACTITIONER

## 2022-07-15 PROCEDURE — 99232 SBSQ HOSP IP/OBS MODERATE 35: CPT | Performed by: PHYSICAL MEDICINE & REHABILITATION

## 2022-07-15 PROCEDURE — 97535 SELF CARE MNGMENT TRAINING: CPT

## 2022-07-15 PROCEDURE — 99232 SBSQ HOSP IP/OBS MODERATE 35: CPT | Performed by: INTERNAL MEDICINE

## 2022-07-15 PROCEDURE — 6370000000 HC RX 637 (ALT 250 FOR IP): Performed by: PHYSICAL MEDICINE & REHABILITATION

## 2022-07-15 PROCEDURE — 92526 ORAL FUNCTION THERAPY: CPT

## 2022-07-15 RX ADMIN — ACETAMINOPHEN 500 MG: 500 TABLET ORAL at 21:05

## 2022-07-15 RX ADMIN — Medication 2000 UNITS: at 17:07

## 2022-07-15 RX ADMIN — ACETAMINOPHEN 500 MG: 500 TABLET ORAL at 14:09

## 2022-07-15 RX ADMIN — ASPIRIN 81 MG: 81 TABLET, COATED ORAL at 08:26

## 2022-07-15 RX ADMIN — TRAMADOL HYDROCHLORIDE 25 MG: 50 TABLET, COATED ORAL at 21:05

## 2022-07-15 RX ADMIN — ISOSORBIDE MONONITRATE 120 MG: 60 TABLET, EXTENDED RELEASE ORAL at 08:26

## 2022-07-15 RX ADMIN — INSULIN LISPRO 2 UNITS: 100 INJECTION, SOLUTION INTRAVENOUS; SUBCUTANEOUS at 12:07

## 2022-07-15 RX ADMIN — TRAMADOL HYDROCHLORIDE 25 MG: 50 TABLET, COATED ORAL at 06:40

## 2022-07-15 RX ADMIN — ARIPIPRAZOLE 5 MG: 5 TABLET ORAL at 08:26

## 2022-07-15 RX ADMIN — Medication 10 MG: at 21:05

## 2022-07-15 RX ADMIN — TRAMADOL HYDROCHLORIDE 25 MG: 50 TABLET, COATED ORAL at 14:10

## 2022-07-15 RX ADMIN — ACETAMINOPHEN 500 MG: 500 TABLET ORAL at 06:38

## 2022-07-15 RX ADMIN — FUROSEMIDE 40 MG: 40 TABLET ORAL at 08:26

## 2022-07-15 RX ADMIN — MIDODRINE HYDROCHLORIDE 5 MG: 5 TABLET ORAL at 08:26

## 2022-07-15 RX ADMIN — Medication 100 MG: at 08:26

## 2022-07-15 RX ADMIN — ATORVASTATIN CALCIUM 40 MG: 40 TABLET, FILM COATED ORAL at 21:05

## 2022-07-15 RX ADMIN — POLYETHYLENE GLYCOL 3350 17 G: 17 POWDER, FOR SOLUTION ORAL at 21:17

## 2022-07-15 RX ADMIN — SERTRALINE 50 MG: 25 TABLET, FILM COATED ORAL at 21:05

## 2022-07-15 RX ADMIN — INSULIN LISPRO 2 UNITS: 100 INJECTION, SOLUTION INTRAVENOUS; SUBCUTANEOUS at 21:07

## 2022-07-15 RX ADMIN — INSULIN GLARGINE 25 UNITS: 100 INJECTION, SOLUTION SUBCUTANEOUS at 21:07

## 2022-07-15 RX ADMIN — INSULIN LISPRO 2 UNITS: 100 INJECTION, SOLUTION INTRAVENOUS; SUBCUTANEOUS at 17:07

## 2022-07-15 RX ADMIN — Medication 400 MG: at 08:26

## 2022-07-15 ASSESSMENT — ENCOUNTER SYMPTOMS
TROUBLE SWALLOWING: 0
WHEEZING: 0
CHEST TIGHTNESS: 0
GASTROINTESTINAL NEGATIVE: 1
COLOR CHANGE: 0
NAUSEA: 0
ABDOMINAL DISTENTION: 0
RESPIRATORY NEGATIVE: 1
COUGH: 0
VOMITING: 0
SHORTNESS OF BREATH: 0

## 2022-07-15 ASSESSMENT — PAIN DESCRIPTION - LOCATION
LOCATION: HIP
LOCATION: BACK
LOCATION: BACK

## 2022-07-15 ASSESSMENT — PAIN DESCRIPTION - FREQUENCY: FREQUENCY: CONTINUOUS

## 2022-07-15 ASSESSMENT — PAIN DESCRIPTION - ORIENTATION
ORIENTATION: RIGHT
ORIENTATION: RIGHT

## 2022-07-15 ASSESSMENT — PAIN DESCRIPTION - PAIN TYPE: TYPE: CHRONIC PAIN

## 2022-07-15 ASSESSMENT — PAIN SCALES - GENERAL
PAINLEVEL_OUTOF10: 6
PAINLEVEL_OUTOF10: 0
PAINLEVEL_OUTOF10: 0
PAINLEVEL_OUTOF10: 8

## 2022-07-15 ASSESSMENT — PAIN DESCRIPTION - DESCRIPTORS
DESCRIPTORS: ACHING
DESCRIPTORS: ACHING

## 2022-07-15 ASSESSMENT — PAIN - FUNCTIONAL ASSESSMENT: PAIN_FUNCTIONAL_ASSESSMENT: ACTIVITIES ARE NOT PREVENTED

## 2022-07-15 NOTE — PROGRESS NOTES
Physical Therapy Rehab Treatment Note  Facility/Department: [facility]  Room: P728/S923-34       NAME: Juliana Lovell  : 1960 (57 y.o.)  MRN: [id number]  CODE STATUS: Full Code    Date of Service: 7/15/2022       Restrictions:  Restrictions/Precautions: Fall Risk  Position Activity Restriction  Other position/activity restrictions: L Rib fractures - 10 and 11 per patient       SUBJECTIVE:   Subjective: I think I can walk pretty good today    Pain  Pain: 6/10 R LBP L knee pain pre session 4/10 post session      OBJECTIVE:      Picking up object with reacher: Independent       Bed Mobility  Overall Assistance Level: Independent  Roll Left  Assistance Level: Independent  Roll Right  Assistance Level: Independent  Sit to Supine  Assistance Level: Independent  Supine to Sit  Assistance Level: Independent  Scooting  Assistance Level: Independent    Sit to Stand  Assistance Level: Independent  Stand to Sit  Assistance Level: Independent  Bed To/From Chair  Assistance Level: Independent  Car Transfer  Assistance Level: Independent    Ambulation  Surface: Level surface; Uneven surface; Carpet; Ramp  Device: Rollator  Distance: 50', 100'  Activity: Within Unit  Activity Comments: Reciprocal pattern good safety  Assistance Level: Independent  Gait Deviations: Slow jean    Stairs  Stair Height: 6''  Device: Bilateral handrails  Number of Stairs: 10 (Performed for strengthening)  Additional Factors: Verbal cues; Non-reciprocal going up;Non-reciprocal going down  Assistance Level: Supervision  Skilled Clinical Factors: Fatigued post stairs descends retro    PT Exercises  Exercise Treatment: Seated Exercises with 2# ankle weight: Long arc quads, marching, Side kicks, Ankle pumps, ball squeezes x 20       ASSESSMENT/PROGRESS TOWARDS GOALS:   Assessment  Assessment: Patient is meeting bed mobility, transfer and gait goals. Test HANSON ramp and curb step in p.m.   Activity Tolerance: Patient tolerated treatment well  Discharge Recommendations: Continue to assess pending progress    Goals:  Long Term Goals  Long term goal 1: Indep bed mobility  Long term goal 2: Indep transfers bed to chair with safest assistive device  Long term goal 3: Ambulate with rollator Indep on level and ramp surfaces >/= 50' to allow safe return home. Long term goal 4: Pt will demonstrate improved score on [place] balance assessment 48/56 for decreased risk for falls. PLAN OF CARE/Safety:   Safety Devices  Type of Devices: All fall risk precautions in place; Chair alarm in place      Therapy Time:   Individual   Time In 930   Time Out 1030   Minutes 60     Minutes: 60  Transfer/Bed mobility trainin  Gait trainin  Therapeutic ex: [address], PTA, 07/15/22 at 10:27 AM

## 2022-07-15 NOTE — PROGRESS NOTES
Nephrology Progress Note    Assessment:  ESRDX OHDx CAD  Anemia  DM type-2  Hx CVA  Hx seizures  Hypertension  Treatment for VRE  Failed access      Plan:dialysis tomorrow  continue with rehab  dialysis catheter use    Patient Active Problem List:     Atherosclerotic heart disease of native coronary artery with unspecified angina pectoris (HCC)     Schizophrenia, paranoid, chronic     Metabolic syndrome     Vitamin B 12 deficiency     Cerebral microvascular disease     Mixed hyperlipidemia     Other hammer toe (acquired)     Vitamin D insufficiency     Incontinence of urine     Diabetic nephropathy with proteinuria ([address]     Essential (primary) hypertension     History of type C viral hepatitis     Urinary incontinence due to cognitive impairment     History of seizures     Stented coronary artery-plan is to stay on Plavix indefinately per Dr Yolande Yanez     Other specified diabetes mellitus with diabetic neuropathy, unspecified ([address]     Controlled type 2 diabetes mellitus with diabetic neuropathy, with long-term current use of insulin (HCC)     Hemiparesis, left (HCC)     Angina, class II ([address]     Pain, unspecified     Tardive dyskinesia     Shortness of breath     Uncontrolled type 2 diabetes mellitus with hyperglycemia (HCC)     Chronic diastolic congestive heart failure (HCC)     Sleep apnea, unspecified     Pulmonary hypertension, unspecified (HCC)     Class 2 severe obesity with serious comorbidity and body mass index (BMI) of 36.0 to 36.9 in adult [facility])     Edema     Closed supracondylar fracture of right humerus     Other chronic pain     Palliative care patient     Recurrent falls     Renal failure     Difficulty in walking     ESRD (end stage renal disease) on dialysis ([address]     Weakness     Other seizures (HCC)     Moderate persistent asthma without complication     [id number]     Post PTCA     Falls frequently     Chest wall contusion, left, initial encounter     Headache, unspecified     Paranoid schizophrenia ([facility]     Compression fracture of spine ([facility])     Closed rib fracture     Depression     Chronic obstructive pulmonary disease ([facility])     Critical illness polyneuropathy ([facility])     Multiple closed fractures of ribs of right side     Nonrheumatic mitral valve regurgitation     Nonrheumatic tricuspid valve regurgitation     Need for extended care facility     Chronic pain of both shoulders     Hemodialysis-associated hypotension     Anginal chest pain at rest [facility])     Chest pain     Unstable angina ([facility])     NSTEMI (non-ST elevated myocardial infarction) ([facility])     CKD (chronic kidney disease) stage 4, GFR 15-29 ml/min ([facility])     Hyperkalemia     Impaired mobility and activities of daily living dt polyneuropathy and reccent fall      Dialysis patient [facility])     Unspecified open wound of right upper arm, initial encounter     Multiple closed fractures of right lower extremity and ribs     Closed T11 fracture ([facility])     Encephalopathy acute     MRSA bacteremia     Sepsis due to Enterococcus ([address]     Local infection due to central venous catheter     DJD (degenerative joint disease), cervical     Closed head injury     Sarcopenia     Fall from standing     Septicemia ([address]     Chronic hepatitis C ([facility]     Catheter-related bloodstream infection     Enterococcus faecalis infection     Cervical stenosis of spinal canal     Ataxic gait      Subjective:  Admit Date: 7/9/2022    Interval History: doing well with dialysis and Rehab    Medications:  Scheduled Meds:   insulin glargine  25 Units SubCUTAneous Nightly    furosemide  40 mg Oral Daily    traMADol  25 mg Oral 3 times per day    acetaminophen  500 mg Oral 3 times per day    Vitamin D  2,000 Units Oral Dinner    cyanocobalamin  1,000 mcg IntraMUSCular Weekly    coenzyme Q10  100 mg Oral Daily    ARIPiprazole  5 mg Oral Daily    aspirin EC  81 mg Oral Daily    atorvastatin  40 mg Oral Nightly    insulin lispro  0-12 Units SubCUTAneous TID WC    insulin lispro 0-6 Units SubCUTAneous Nightly    isosorbide mononitrate  120 mg Oral Daily    lidocaine  3 patch TransDERmal Daily    magnesium oxide  400 mg Oral Daily    melatonin  10 mg Oral Nightly    midodrine  5 mg Oral Once per day on Mon Wed Fri    polyethylene glycol  17 g Oral Daily    sertraline  50 mg Oral Nightly    vancomycin (VANCOCIN) intermittent dosing (placeholder)   Other RX Placeholder     Continuous Infusions:   dextrose         CBC:   Recent Labs     07/14/22  0500   WBC 8.4   HGB 9.2*        CMP:    Recent Labs     07/14/22  0500   *   K 5.6*   CL 93*   CO2 21   BUN 38*   CREATININE 3.62*   GLUCOSE 178*   CALCIUM 10.2*   LABGLOM 12.6*     Troponin: No results for input(s): TROPONINI in the last 72 hours. BNP: No results for input(s): BNP in the last 72 hours. INR: No results for input(s): INR in the last 72 hours. Lipids: No results for input(s): CHOL, LDLDIRECT, TRIG, HDL, AMYLASE, LIPASE in the last 72 hours. Liver: No results for input(s): AST, ALT, ALKPHOS, PROT, LABALBU, BILITOT in the last 72 hours. Invalid input(s): BILDIR  Iron:  No results for input(s): IRONS, FERRITIN in the last 72 hours. Invalid input(s): LABIRONS  Urinalysis: No results for input(s): UA in the last 72 hours.     Objective:  Vitals: /62   Pulse 82   Temp 98.1 °F (36.7 °C) (Oral)   Resp 14   Ht 5' 7\" (1.702 m)   Wt 194 lb 3.6 oz (88.1 kg)   LMP  (LMP Unknown)   SpO2 100%   BMI 30.42 kg/m²    Wt Readings from Last 3 Encounters:   07/14/22 194 lb 3.6 oz (88.1 kg)   07/08/22 189 lb 9.5 oz (86 kg)   06/22/22 217 lb (98.4 kg)      24HR INTAKE/OUTPUT:    Intake/Output Summary (Last 24 hours) at 7/15/2022 0816  Last data filed at 7/14/2022 1533  Gross per 24 hour   Intake 400 ml   Output 3100 ml   Net -2700 ml       General: alert, in no apparent distress  HEENT: normocephalic, atraumatic, anicteric  Neck: supple, no mass  Lungs: non-labored respirations, clear to auscultation bilaterally  Heart: regular rate and rhythm, no murmurs or rubs  Abdomen: soft, non-tender, non-distended  Ext: no cyanosis, no peripheral edema  Neuro: alert and oriented, no gross abnormalities  Psych: normal mood and affect  Skin: no rash      Electronically signed by Aldair Pena DO, MD

## 2022-07-15 NOTE — PROGRESS NOTES
[facility] Neurology Daily Progress Note  Name: Herlinda Celaya  Age: 57 y.o. Gender: female  CodeStatus: Full Code  Allergies: Codeine  Oxycontin [Oxycodone Hcl]    Chief Complaint:No chief complaint on file. Primary Care Provider: Concepcion Castro MD  InpatientTreatment Team: Treatment Team: Attending Provider: Derek Gregory DO; Consulting Physician: Noemi Aguirre MD; Consulting Physician: Gee Chandler DO; Consulting Physician: Josy Flor MD; Consulting Physician: Jed Linder MD; Consulting Physician: Maia Pablo MD; Nursing Student: Chelly Cantor; Registered Nurse: Brenna Martinez RN; Registered Nurse: Lacey Caruso. Amandeep Lee; Occupational Therapist: Amrita Mckoy OT; Occupational Therapist: Rosie Wagner OTR/L; : Catherine Hernandez MSW, LSW; Patient Care Tech: Boston Degroot  Admission Date: 7/9/2022      HPI   Pt seen and examined on rehab unit for gait ataxia with falls in the setting of severe cervical canal stenosis. Patient currently alert and oriented x3, no acute distress, cooperative. Reports overall she feels she is getting a little stronger with therapies. Afebrile. Vancomycin completed. BMP yesterday shows hyponatremia at 127 with elevated potassium of 5.6. Patient is on dialysis. Nephrology managing. No encephalopathy noted. Vents noted. Patient actually is improved some degree in terms of her walking. Patient is not a surgical candidate from surgical standpoint and we are not quite sure we will go with this. She had some hyponatremia as well  Vitals:    07/15/22 0649   BP: 125/62   Pulse: 82   Resp: 14   Temp: 98.1 °F (36.7 °C)   SpO2: 100%      Review of Systems   Constitutional:  Negative for appetite change and fever. HENT:  Negative for hearing loss and trouble swallowing. Eyes:  Negative for visual disturbance. Respiratory:  Negative for cough, chest tightness, shortness of breath and wheezing.     Cardiovascular:  Negative for chest pain, palpitations and leg swelling. Gastrointestinal:  Negative for abdominal distention, nausea and vomiting. Genitourinary:  Negative for difficulty urinating. Musculoskeletal:  Positive for gait problem. Skin:  Negative for color change and rash. Neurological:  Positive for weakness. Negative for dizziness, tremors, seizures, syncope, facial asymmetry, speech difficulty, light-headedness, numbness and headaches. Psychiatric/Behavioral:  Negative for agitation, confusion and hallucinations. The patient is not nervous/anxious. Physical Exam  Vitals and nursing note reviewed. Constitutional:       General: She is not in acute distress. Appearance: She is not diaphoretic. HENT:      Head: Normocephalic. Eyes:      Pupils: Pupils are equal, round, and reactive to light. Cardiovascular:      Rate and Rhythm: Normal rate and regular rhythm. Pulmonary:      Effort: Pulmonary effort is normal. No respiratory distress. Breath sounds: Normal breath sounds. Abdominal:      General: Bowel sounds are normal. There is no distension. Palpations: Abdomen is soft. Tenderness: There is no abdominal tenderness. Skin:     General: Skin is warm and dry. Neurological:      Mental Status: She is alert and oriented to person, place, and time. Cranial Nerves: No cranial nerve deficit. Motor: Weakness present. No tremor, atrophy, abnormal muscle tone, seizure activity or pronator drift. Coordination: Coordination normal. Finger-Nose-Finger Test normal.      Gait: Gait abnormal.      Deep Tendon Reflexes: Reflexes abnormal.      Reflex Scores:       Patellar reflexes are 0 on the right side and 0 on the left side. Achilles reflexes are 0 on the right side and 0 on the left side. Patient actually appears to be stable but her ambulation is improved to some degree.   She is not dizzy        Medications:  Reviewed    Infusion Medications:    dextrose       Scheduled Medications:    insulin glargine  25 Units SubCUTAneous Nightly    furosemide  40 mg Oral Daily    traMADol  25 mg Oral 3 times per day    acetaminophen  500 mg Oral 3 times per day    Vitamin D  2,000 Units Oral Dinner    cyanocobalamin  1,000 mcg IntraMUSCular Weekly    coenzyme Q10  100 mg Oral Daily    ARIPiprazole  5 mg Oral Daily    aspirin EC  81 mg Oral Daily    atorvastatin  40 mg Oral Nightly    insulin lispro  0-12 Units SubCUTAneous TID WC    insulin lispro  0-6 Units SubCUTAneous Nightly    isosorbide mononitrate  120 mg Oral Daily    lidocaine  3 patch TransDERmal Daily    magnesium oxide  400 mg Oral Daily    melatonin  10 mg Oral Nightly    midodrine  5 mg Oral Once per day on Mon Wed Fri    polyethylene glycol  17 g Oral Daily    sertraline  50 mg Oral Nightly    vancomycin (VANCOCIN) intermittent dosing (placeholder)   Other RX Placeholder     PRN Meds: miconazole, heparin (porcine), trimethobenzamide, melatonin, acetaminophen **OR** acetaminophen, albuterol, camphor-menthol-methyl salicylate, dextrose, dextrose bolus **OR** dextrose bolus, glucagon (rDNA), glucose, hydrOXYzine HCl, polyethylene glycol    Labs:   Recent Labs     07/14/22  0500   WBC 8.4   HGB 9.2*   HCT 26.9*        Recent Labs     07/14/22  0500   *   K 5.6*   CL 93*   CO2 21   BUN 38*   CREATININE 3.62*   CALCIUM 10.2*     No results for input(s): AST, ALT, BILIDIR, BILITOT, ALKPHOS in the last 72 hours. No results for input(s): INR in the last 72 hours. No results for input(s): Rosina Lyle in the last 72 hours. Urinalysis:   Lab Results   Component Value Date/Time    NITRU Negative 07/10/2022 04:51 AM    WBCUA 0-2 07/10/2022 04:51 AM    BACTERIA Negative 07/10/2022 04:51 AM    RBCUA 3-5 07/10/2022 04:51 AM    BLOODU Negative 07/10/2022 04:51 AM    SPECGRAV 1.009 07/10/2022 04:51 AM    GLUCOSEU 100 07/10/2022 04:51 AM       Radiology:   Most recent    EEG No valid procedures specified.     MRI of Brain No results found for this or any previous visit. Results for orders placed during the hospital encounter of 06/30/22    MRI BRAIN WO CONTRAST    Narrative  EXAM: MRI of the brain without contrast    History: Stroke. Weakness. Technique: Multiplanar multisequence MRI of the brain was performed without contrast.    Comparison: MRI of the brain October 9, 2017. CT of the brain June 30, 2022    Findings:    Foci of white matter signal abnormality within the supratentorial white matter are nonspecific but most compatible with chronic small vessel ischemic changes in a patient of this age. Prominence of the sulci and ventricles compatible with mild generalized parenchymal volume loss. No acute hemorrhage, mass, mass effect, midline shift, or abnormal extra-axial fluid collection. Midline structures are within normal limits. The posterior  fossa is within normal limits. There is no diffusion restriction. No susceptibility artifact is identified on the gradient echo sequence. The major intracranial vascular flow voids are maintained. Cranial nerves 7/8 complexes appear grossly unremarkable. Mild paranasal sinus mucosal thickening. Fluid is present within the bilateral mastoid air cells compatible with nonspecific mastoid air  cell effusions. Impression  No acute ischemia or acute intracranial process. Generalized parenchymal volume loss and nonspecific white matter findings most compatible with chronic small vessel ischemic changes in a patient of this age. MRA of the Head and Neck: No results found for this or any previous visit. No results found for this or any previous visit. No results found for this or any previous visit.                             CT of the Head: Results for orders placed during the hospital encounter of 06/30/22    CT HEAD WO CONTRAST    Narrative  CT HEAD WO CONTRAST : 6/30/2022    CLINICAL HISTORY:  fall from standing last night confused at dialysis c/o back pain . COMPARISON: 10/11/2021. TECHNIQUE: Spiral unenhanced images were obtained of the head, with routine multiplanar reconstructions performed. All CT scans at this facility use dose modulation, iterative reconstruction, and/or weight based dosing when appropriate to reduce radiation dose to as low as reasonably achievable. FINDINGS:    There is no intracranial hemorrhage, mass effect, midline shift, extra-axial collection, evidence of hydrocephalus, recent ischemic infarct, or skull fracture identified. Chronic volume loss and mild mucosal thickening is again noted within the maxillary sinuses. The mastoid air cells and other visualized paranasal sinuses are essentially clear. Impression  NO ACUTE INTRACRANIAL PROCESS OR SIGNIFICANT CHANGE FROM 10/11/2021 IDENTIFIED. No results found for this or any previous visit. No results found for this or any previous visit. Carotid duplex: No results found for this or any previous visit. No results found for this or any previous visit. Results for orders placed during the hospital encounter of 12/01/21    US CAROTID ARTERY BILATERAL    Narrative  EXAMINATION:  CAROTID DUPLEX ULTRASONOGRAPHY    CLINICAL HISTORY:  DIZZINESS AND CAROTID STENOSIS    COMPARISONS:  October 9, 2017    TECHNIQUE:  B-mode, color flow and spectral Doppler    FINDINGS:    ARTERIAL BLOOD FLOW VELOCITY    RIGHT PS    Prox CCA    88 cm/s  Mid CCA     71 cm/s  Dist CCA    71 cm/s  Prox ICA    63 cm/s  Mid ICA     126 cm/s  Dist ICA    116 cm/s  Prox ECA    93 cm/s  Prox VERT   41 cm/s    ICA/CCA     1.78    LEFT PS    Prox CCA    183 cm/s  Mid CCA     96 cm/s  Dist CCA    82 cm/s  Prox ICA    129 cm/s  Mid ICA     110 cm/s  Dist ICA    84 cm/s  Prox ECA    90 cm/s  Prox VERT   58 cm/s    ICA/CCA     1.42    Impression  MILD SCATTERED ATHEROSCLEROSIS BOTH CAROTID TREES. AREAS OF INTIMAL THICKENING. NO FLOW OBSTRUCTION NOTED.     BY VELOCITIES BILATERAL ICAS 50-69% STENOSIS. BILATERAL ANTEGRADE VERTEBRAL FLOW. Echo No results found for this or any previous visit. Assessment/Plan:  7/11/22:  Gait ataxia with falls  MRI of the brain negative for acute findings  MRI of the lumbar spine did not show significant canal stenosis. DDD noted. MRI of the cervical spine shows severe canal stenosis from C4-5 through C6-7. Neurosurgery consulted and recommending conservative treatment at this time. Continue with therapies and we will continue to follow her clinical course    7/13/22:   Gait ataxia with falls with noted severe canal stenosis from C4-5 through C6-7. Patient with bacteremia from infected dialysis catheter currently being treated with vancomycin. Infectious disease following. Dialysis catheter was changed. Neurosurgery recommending conservative treatment at this time for patient's cervical canal stenosis given her underlying medical issues. We will continue with therapies. Plans for discharge home on 7/17/2022 preliminarily. 7/15/2022:  Gait ataxia with falls with noted severe canal stenosis from C4-5 through C6-7  Patient with bacteremia secondary to infected dialysis catheter which has been changed. Vancomycin is now completed. Neurosurgery to follow-up with patient on outpatient basis for consideration of surgical intervention once medically stable. End-stage renal disease on dialysis. Electrolyte abnormalities noted yesterday with hyponatremia and hyperkalemia. Nephrology managing. No encephalopathy noted. Continue with therapies. Patient plans to discharge home this weekend. I have personally performed a face to face diagnostic evaluation on this patient, reviewed all data and investigations, and am the sole provider of all clinical decisions on the neurological status of this patient. Patient's ambulation is improved to some degree. She is not as dizzy. She is not a surgical candidate and neurosurgery has been following her. We have nothing much to offer her and patient will discharge from neurological standpoint. 60% time spent on evaluating and managing this patient. We did discuss with the patient regarding her condition that this is going to be chronic and safety issues have been discussed with the patient. In the event that she worsen she should touch base with neurosurgery. Darren Marino MD, [address], American Board of Psychiatry & Neurology  Board Certified in Vascular Neurology  Board Certified in Neuromuscular Medicine  Certified in Neurorehabilitation       Collaborating physicians: Dr Hugo Marino    Electronically signed by LORETO Garcia CNP on 7/15/2022 at 12:38 PM

## 2022-07-15 NOTE — PROGRESS NOTES
and utilize them in  structured tasks in 80% of opportunities with min cues. Goal 5: To decrease cognitive deficits and improve attention to tasks for safe completion of ADLs, pt will complete structured tasks addressing alternating and divided attention with 80% accuracy and min cues. Short-term Goals  Timeframe for Short-term Goals: 1-2 weeks  Goal 1: Pt will complete lingual exercises that promote anterior to posterior propulsion of bolus and improve tongue base retraction with 80% accuracy in order to strengthen the muscles of the swallow to decrease risk of aspiration and to increase ability to safely handle the least restrictive diet level. Goal 2: Pt will complete effortful swallow exercise with 80% accuracy, given cues as needed, to decrease bolus residue in the valleculae per all consistencies. Goal 3: Pt will tolerate 5/5 trials of easy to chew solids with adequate mastication and oral clearance of bolus in all opportunities. Pt completed trials of easy to chew and tolerated all trials with adequate mastication and clearance of bolus. Pt trialed regular solids and had mild increased mastication time but cleared all bolus from oral cavity. SLP cued on 1st trial for double swallow strategy. Pt used alternating solids/liquids Independently. SLP recommend upgrade to regular solids and communicated to FLAQUITO Jason and left voicemail for Dr José Sparrow. Goal 4: Pt will tolerate the recommended diet level without overt s/s of aspiration in all opportunities. Treatment/Activity Tolerance:  Patient tolerated treatment well    Plan:  Continue per POC    Pain Assessment:  Patient c/o pain: 5/10 left knee    Pain Re-assessment:  Patient c/o pain: 5/10 left knee  RN notified: FLAQUITO Youssef    Patient/Caregiver Education:  Patient educated on session and progression towards goals. Patient stated verbal understanding of directions. Safety Devices:   All fall risk precautions in place      Dysphagia Outcome Severity Scale    SWALLOWING  Ratin      Speech Therapy Level of Assistance Scale    AUDITORY COMPREHENSION  Rating:  Independent-Modified Independent    VERBAL EXPRESSION  Rating:  Independent-Modified Independent    MOTOR SPEECH  Rating: Independent    PROBLEM SOLVING  Rating: Supervised Assistance    MEMORY  Rating:  Supervised Assistance-Minimal Assistance          Therapy Time   Time in: 1030  Time out: 1100  Cognition: 10  Swallow: 20  Minutes: 30              Signature: Electronically signed by ANN MARIE Gonsalez on 7/15/2022 at 11:04 AM

## 2022-07-15 NOTE — PROGRESS NOTES
Pt's one touch at hs was 211. Lantus 25 units and humalog 2 units given subcut at hs. Pt  cooperative and pleasant. Vascath intact to left chest for Hemodialysis. Fistula to right upper arm nonfunctional. Scabs noted to both arms bilaterally that pt picks and bleeds. New bandaids applied. Purwick intact at hs. Last BM was 7/14/22. Pt medicated with scheduled ultram 25 mg and Tylenol 500 mg po at hs. Call light in reach and bed alarm on Electronically signed by Abilio Caballero.  Payton Ennis on 7/15/2022 at 4:51 AM

## 2022-07-15 NOTE — PROGRESS NOTES
OCCUPATIONAL THERAPY  INPATIENT REHAB TREATMENT NOTE  [facility]      NAME: Contreras Shelley  : 1960 (89 y.o.)  MRN: [id number]  CODE STATUS: Full Code  Room: R248/R248-01    Date of Service: 7/15/2022    Referring Physician: Dr. Tristen Ribeiro  Rehab Diagnosis: impaired mobility and ADLs d/t polyneuropathy and recent fall    Restrictions  Restrictions/Precautions  Restrictions/Precautions: Fall Risk         Position Activity Restriction  Other position/activity restrictions: R Rib fractures - 10 and 11 per patient, dialysis port L chest, No Showering    Patient's date of birth confirmed: Yes    SAFETY:  Safety Devices  Safety Devices in place: Yes  Type of devices: All fall risk precautions in place    SUBJECTIVE:  Subjective: \" my daughter helps me with the fitted sheets. \"    Pain at start of treatment: Yes: 7/10    Pain at end of treatment: Yes: 7/10    Location: L knee  Nursing notified: No  RN:   Intervention: RN provided pain medication    COGNITION:  Orientation  Overall Orientation Status: Within Normal Limits  Orientation Level: Oriented to place;Oriented to time;Oriented to person;Oriented to situation  Cognition  Overall Cognitive Status: WFL          OBJECTIVE:         Grooming/Oral Hygiene  Assistance Level: Modified independent  Skilled Clinical Factors: shaving; provided supervision/vc's for pt to transitin 4 w/w behind her to sit on, vc's for brake safety, pt able to s/u own shaving tasks and perform with mod Ind  Putting On/Taking Off Footwear  Equipment Provided: Sock aid  Assistance Level: Minimal assistance  Skilled Clinical Factors: DEP A for foot washing/drying(pt daughter does this at home); able to don socks (R foot regularly and L foot with sock aide needing task started d/t tightness of top of sock)    Instrumental ADL's  Instrumental ADLs: Yes  Light Housekeeping  Light Housekeeping Level: Pari Liu Housekeeping Level of Assistance: Supervision  Light Housekeeping: completed stipping/making bed according to how pt completes at home. Pt assisted with stipping bed sitting at EOB (training for safety d/t decreased balance when standing), then assist pt with placement of fitted sheet, pt assiste 50% with flat sheet, req rest break on 4 w/w demonstrating good safety to sit, and then completed donning/doffing pillow cases during task while seated. Functional Mobility  Device: 4-Wheeled walker  Activity: To/From bathroom; Retrieve items;Transport items  Assistance Level: Supervision  Supine to Sit  Assistance Level: Modified independent  Scooting  Assistance Level: Modified independent  Sit to Stand  Assistance Level: Supervision  Stand to Sit  Assistance Level: Supervision                                            Education:  Education  Education Given To: Patient  Education Provided: Transfer Training;Mobility Training  Education Provided Comments: transfers and transitioning with 4 w/w to complete standing/sitting tasks; mobility with 4 w/w for pacing and body to walker closeness for decreasing risk of falls  Education Method: Verbal  Barriers to Learning: Cognition  Education Outcome: Continued education needed    Equipment recommendations: sock aide         ASSESSMENT:  Assessment: Pt motivated to return home and participated in all tasks asked to complete in  Activity Tolerance: Patient tolerated treatment well      PLAN OF CARE:  Strengthening, Balance training, Functional mobility training, Endurance training, Safety education & training, Patient/Caregiver education & training, Equipment evaluation, education, & procurement, Self-Care / ADL, Home management training  continue with current POC until discharge of 22    Patient goals : work on strengthening my arms and legs, and improve balance so I don't fall           Therapy Time:   Individual Group Co-Treat   Time In 0830       Time Out 0930         Minutes 60                   ADL/IADL trainin minutes     Electronically signed by:     MARY Ahuja,   7/15/2022, 9:30 AM

## 2022-07-15 NOTE — PROGRESS NOTES
Infectious Disease     Patient Name: Jenna Medina  Date: 7/15/2022  YOB: 1960  Medical Record Number: [id number]        Sepsis with Enterococcus  Infected dialysis cath     mitral valve regurgitation  coronary artery bypass graft x1 vessel with tricuspid valve repair on 1/21/2022      Blood cultures from admission 2 sets  Enterococcus faecalis             Review of Systems   Constitutional: Negative. Negative for chills, diaphoresis, fatigue and fever. Respiratory: Negative. Cardiovascular: Negative. Gastrointestinal: Negative. Physical Exam  Cardiovascular:      Heart sounds: Normal heart sounds. No murmur heard. Pulmonary:      Effort: Pulmonary effort is normal. No respiratory distress. Breath sounds: Normal breath sounds. No wheezing, rhonchi or rales. Abdominal:      General: Abdomen is flat. Bowel sounds are normal. There is no distension. Palpations: Abdomen is soft. There is no mass. Tenderness: There is no abdominal tenderness. There is no guarding. Blood pressure 125/62, pulse 82, temperature 98.1 °F (36.7 °C), temperature source Oral, resp. rate 14, height 5' 7\" (1.702 m), weight 194 lb 3.6 oz (88.1 kg), SpO2 100 %, not currently breastfeeding.       .   Lab Results   Component Value Date    WBC 8.4 07/14/2022    HGB 9.2 (L) 07/14/2022    HCT 26.9 (L) 07/14/2022    MCV 91.3 07/14/2022     07/14/2022     Lab Results   Component Value Date/Time     07/14/2022 05:00 AM    K 5.6 07/14/2022 05:00 AM    K 4.3 07/04/2022 03:07 AM    CL 93 07/14/2022 05:00 AM    CO2 21 07/14/2022 05:00 AM    BUN 38 07/14/2022 05:00 AM    CREATININE 3.62 07/14/2022 05:00 AM    GLUCOSE 178 07/14/2022 05:00 AM    GLUCOSE 419 07/30/2021 08:16 AM    CALCIUM 10.2 07/14/2022 05:00 AM         7/1/22 1613   Culture Catheter Tip Culture, Catheter Tip:  NO GROWTH   Performed at [facility] [id number] [place] [address], [address] [place] [address]   ([phone]        6/30/22 0266 Culture, Blood Id Sensitivity  Abnormal   Cult,Blood:  POSITIVE Blood Culture   Performed at [facility], [address] [place] [address]   ([phone]   P      Organism Enterococcus faecalis Abnormal  P    Resulting Agency [address] [facility]          Susceptibility      Enterococcus faecalis (2)    Antibiotic Interpretation Microscan  Method Status    ampicillin Sensitive <=2 mcg/mL BACTERIAL SUSCEPTIBILITY PANEL BY DIYA     vancomycin Sensitive 1 mcg/mL BACTERIAL SUSCEPTIBILITY PANEL BY DIYA                   7/1/22 1613   Culture Catheter Tip Culture, Catheter Tip:  NO GROWTH          ASSESSMENT:  PLAN:    Sepsis with Enterococcus  Infected dialysis cath   vancomycin x 2week

## 2022-07-15 NOTE — PROGRESS NOTES
Subjective: The patient complains of  moderate to severe acute      partially relieved by rest, PT, OT, rest, pain medications and exacerbated by recent illness and exertion. Continues to have pain from her multiple rib fractures and is recovering from bilateral pleural effusions and balancing cardiac with renal risks. Nephrology is consulting and there titrating Lasix with caution. She is getting hemodialysis. I am concerned about patients medical complexities and current active problems and barriers to progress including hx of hep C and paranoid schitzophrenia. He is hoping to trial Bengay and heating pad for her achy muscles. Patient is getting nauseated when she is due for her dialysis due to toxic meds tabulate built up. She is responding to dialysis and Tigan. We also discussed her renal osteodystrophy pain and scheduling her Ultram every 8 hours she is in agreement. She does not feel that she is on the right dose of insulin and we will consult Dr. Marylene Score. Podiatry was consulted for long toenails and ichthyosis. I have adjusted her vancomycin level checks. She is for hemodialysis on Saturday and discharge home on Sunday. Dr. Do Reyes and nephrology will work on her Lasix dose. They are balancing her renal and cardiac issues. He is hoping to have home health care at discharge and then transition to outpatient therapy. I discussed current functional, rehabilitation, medical status with other rehabilitation providers including nursing and case management. According to recent nursing note, \"Pt's one touch at hs was 211. Lantus 25 units and humalog 2 units given subcut at hs. Pt  cooperative and pleasant. Vascath intact to left chest for Hemodialysis. Fistula to right upper arm nonfunctional. Scabs noted to both arms bilaterally that pt picks and bleeds. New bandaids applied. Purwick intact at hs. Last BM was 7/14/22. Pt medicated with scheduled ultram 25 mg and Tylenol 500 mg po at hs.  Call light in reach and bed alarm on \". ROS x10: The patient also complains of severely impaired mobility and activities of daily living. Otherwise no new problems with vision, hearing, nose, mouth, throat, dermal, cardiovascular, GI, , pulmonary, musculoskeletal, psychiatric or neurological. See Rehab H&P on Rehab chart dated . Vital signs:  /62   Pulse 82   Temp 98.1 °F (36.7 °C) (Oral)   Resp 14   Ht 5' 7\" (1.702 m)   Wt 194 lb 3.6 oz (88.1 kg)   LMP  (LMP Unknown)   SpO2 100%   BMI 30.42 kg/m²   I/O:   PO/Intake:  fair PO intake, no problems observed or reported. Bowel/Bladder:  incontinent,Purwic constipation and urinary urgency. Last BM 7/9/22. General:  Patient is well developed, adequately nourished, non-obese and     well kempt. HEENT:    PERRLA, hearing intact to loud voice, external inspection of ear     and nose benign. Inspection of lips, tongue and gums  No teeth  Musculoskeletal: No significant change in strength or tone. All joints stable. Inspection and palpation of digits and nails show no clubbing,       cyanosis or inflammatory conditions. Neuro/Psychiatric: Affect: flat. Alert and oriented to person, place and     situation. No significant change in deep tendon reflexes or     Sensation    Lungs:  Diminished, CTA-B. Respiration effort is normal at rest.   fractured right 10-11 th ribs. tunneled Vascath left upper    chest with 2 ports. Heart:   S1 = S2, RRR. No loud murmurs. Abdomen:  Soft, non-tender, no enlargement of liver or spleen. Extremities:  Trace lower extremity edema,    tenderness. dialysis fistula graft to right upper arm that is not in use  Skin:   Intact   - discolored and bruised . ruise to back of right shoulder and right knee.  Small sores noted to arms x 3     Rehabilitation:  Physical therapy: FIMS:  Bed Mobility: Scooting: Stand by assistance    Transfers: Sit to Stand: Stand by assistance  Stand to sit: Stand by assistance  Bed to Chair: Stand by assistance, Ambulation  Surface: level tile  Device: Rollator  Assistance: Stand by assistance  Quality of Gait: wide JAKE  Gait Deviations: Slow Tere, Decreased step length, Decreased step height  Distance: 60ft- pt exhibiting fatigue with this distance,      FIMS:  ,  , Assessment: Pt is 57 y.o. female to hospital due to septicemia. Pt exhibits decreased strength, balance, posture and increased pain which increases pt need for assistance with mobility to decreased risk for falls at home. Continued PT is required for safe d/c home with family. Occupational therapy: FIMS:   ,  , Assessment: Pt is a 58 y/o female who presents to [facility] for medical decline with fall. Pt lives with dtr who assists with ADLs and IADLs PTA. Pt presents with above deficits and impaired ADL status. Pt may benefit from skilled OT intervention for increased indepdnence and safety upon d/c to home    Speech therapy: FIMS:        Lab/X-ray studies reviewed, analyzed and discussed with patient and staff:   Recent Results (from the past 24 hour(s))   POCT Glucose    Collection Time: 07/14/22 11:30 AM   Result Value Ref Range    POC Glucose 173 (H) 70 - 99 mg/dl    Performed on ACCU-CHEK    POCT Glucose    Collection Time: 07/14/22  3:58 PM   Result Value Ref Range    POC Glucose 113 (H) 70 - 99 mg/dl    Performed on ACCU-CHEK    POCT Glucose    Collection Time: 07/14/22  9:04 PM   Result Value Ref Range    POC Glucose 211 (H) 70 - 99 mg/dl    Performed on ACCU-CHEK    POCT Glucose    Collection Time: 07/15/22  6:49 AM   Result Value Ref Range    POC Glucose 118 (H) 70 - 99 mg/dl    Performed on ACCU-CHEK        Previous extensive, complex labs, notes and diagnostics reviewed and analyzed.      ALLERGIES:    Allergies as of 07/09/2022 - Fully Reviewed 06/30/2022   Allergen Reaction Noted    Codeine Hives 12/09/2011    Oxycontin [oxycodone hcl] Hives 06/15/2020      (please also verify by checking MAR)       Complex Physical Medicine & Rehab Issues Assess & Plan:   Severe abnormality of gait and mobility and impaired self-care and ADL's secondary to progressive weakness dt OA flareup and  Polyneuropathy . Functional and medical status reassessed regarding patients ability to participate in therapies and patient found to be able to participate in acute intensive comprehensive inpatient rehabilitation program including PT/OT to improve balance, ambulation, ADLs, and to improve the P/AROM. Therapeutic modifications regarding activities in therapies, place, amount of time per day and intensity of therapy made daily. In bed therapies or bedside therapies prn. Bowel progressive constipation, and Bladder dysfunction monitoring neurogenic bladder,   Incontinence of urine:  frequent toileting, ambulate to bathroom with assistance, check post void residuals. Check for C.difficile x1 if >2 loose stools in 24 hours, continue bowel & bladder program.  Monitor bowel and bladder function. Lactinex 2 PO every AC. MOM prn, Brown Bomb prn, Glycerin suppository prn, enema prn. Severe chest wall pain as well as generalized OA pain, fracture T11 chronic pain of both shoulders: reassess pain every shift and prior to and after each therapy session, give prn Tylenol and Ultram, modalities prn in therapy, Lidoderm, K-pad prn. Skin healing and breakdown risk:  continue pressure relief program.  Daily skin exams and reports from nursing. Add co-Q10  Severe fatigue due to nutritional and hydration deficiency: Add vitamin B12 vitamin D and CoQ10 continue to monitor I&Os, calorie counts prn, dietary consult prn. Add healthy HS snack. Acute episodic insomnia with situational adjustment disorder:  consider prn low dose Ambien, monitor for day time sedation. Add HS \"Tuck In\"  Falls risk elevated:  patient to use call light to get nursing assistance to get up, bed and chair alarm.   Elevated DVT risk: progressive activities in PT, continue discharge,  Now with low blood pressure but history of essential (primary) hypertension,   Chronic diastolic congestive heart failure-Acute rehab to monitor heart rate and rhythm with the option of telemetry and the effects of chronotropic medication with respect to increasing physical activity and exercise in PT, OT, ADLs with medication titration to lowest effective dosing. Continue blood signs every shift focusing on heart rate, rhythm and blood pressure checks with orthostatic checks-monitoring the effect of exercise, therapy and posture. Consult hospitalist for backup medical and adjust/add medications (aspirin, Imdur, Lipitor, ProAmatine). Monitor heart rate and blood pressure as well as medications effects on vital signs before during and after therapy with especial focus on preventing orthostasis and falls risk. Controlled type 2 diabetes mellitus with diabetic neuropathy, with long-term current use of insulin-Continue blood sugar checks every shift, diet, add diabetic add dietary education, restrict carbohydrates to lowest effective and safe carb count per meal advising 4 carbs per meal, add at bedtime snack to prevent a.m. hypoglycemia, adjust/add medications (Lantus, sliding scale insulin)     Pulmonary hypertension -Acute rehab for endurance traing with Pulse Ox to monitoring oxygen saturation and heart rate with O2 titration to lowest effective dose. Pulse oximeter checks to shift and at HS to dose and titrate oxygen and aerosol treatments monitor for nocturnal hypoxemia, monitor vital signs, oxygen prn. Focus on energy conservation. Paranoid schizophrenia -emotional support provided daily, vitamin B12, encourage participation in rehabilitation support group and recreational therapy, adjust/add medications -patient had been on Zoloft at home.          Focus of today's plan-  Initiate and modify therapuetic plan to meet patients individual needs, add rest breaks as needed  -consider resuming SSRI. Patient is getting nauseated when she is due for her dialysis due to toxic meds tabulate built up. Begin to set up home health care transitioning to outpatient therapy.       Lavelle Pisano D.O., PM&R     Attending    [address]

## 2022-07-15 NOTE — PROGRESS NOTES
[facility] OCCUPATIONAL THERAPY DISCHARGE SUMMARY- REHAB     Date: 7/15/2022  Patient Name: Daniele Dockery        MRN: [id number]  Account: [de-identified]   : 1960  (57 y.o.)  Room: [place] [address]    Diagnosis:  impaired mobility and ADLs d/t polyneuropathy and recent fall    Past Medical History:   Diagnosis Date    Angina at rest [facility]) 2020    Anxiety     CAD S/P percutaneous coronary angioplasty ,     stents per dr Ben Wilcox    CHF (congestive heart failure) ([address]     CKD (chronic kidney disease) stage 4, GFR 15-29 ml/min ([address] 2018    CKD stage 4 due to type 2 diabetes mellitus ([address]     Contusion of right chest wall 2021    COPD (chronic obstructive pulmonary disease) ([address]     Diabetic nephropathy with proteinuria ([address]     DJD (degenerative joint disease) of knee     Dr Gracia Stark    GERD (gastroesophageal reflux disease)     Hemiparesis, left ([address] 2013    entered Assisted Living ([facility])    Hemodialysis patient [facility])     Hemodialysis-associated hypotension 10/22/2021    History of heart failure     History of seizures     History of type C viral hepatitis     HTN (hypertension)     Hyperlipidemia     Impaired mobility and activities of daily living     Mediastinal lymphadenopathy     Dr Reji Escobedo, [place]    Metabolic syndrome     Moderate persistent asthma without complication 3/58/9968    Need for extended care facility 2021    Neurogenic urinary incontinence 2013    Neuropathy in diabetes ([address]     Nonrheumatic mitral valve regurgitation 2021    Nonrheumatic tricuspid valve regurgitation 2021    Obesity (BMI 30-39. 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 Moira Panchal      x1 Dr. Edgard Alas, Dr Fantasma Gasca  08/10/2021    [facility]    DIAGNOSTIC CARDIAC CATH LAB PROCEDURE  10/02/2019    DIALYSIS CATHETER INSERTION Left 2022    Tunneled Symetrex 15.5F x 23cm hemodialysis catheter inserted by Dr. Klarissa Ovalle, TOTAL ABDOMINAL (CERVIX REMOVED)      one ovary intact, Dr Zeynep Jacobs, menorrhagia    IR THROMBECTOMY PERCUT AVF  2022    IR THROMBECTOMY PERCUT AVF 2022 [facility] PROCEDURE    IR TUNNELED CATHETER PLACEMENT GREATER THAN 5 YEARS  2022    IR TUNNELED CATHETER PLACEMENT GREATER THAN 5 YEARS 6/3/2022 [facility] PROCED[facility]    IR TUNNELED CATHETER PLACEMENT GREATER THAN 5 YEARS Left 2022    15.5 fr by 23 cm Symetrex dialysis catheter inserted by Dr Teofilo Jones    IR TUNNELED [address] 5 YEARS  2022    IR TUNNELED CATHETER PLACEMENT GREATER THAN 5 YEARS 2022 [facility] PROCEDURE    NC TOTAL KNEE ARTHROPLASTY Left 2018    LEFT KNEE TOTAL KNEE ARTHROPLASTY, SHAYNA, NERVE BLOCK [facility]     TOTAL KNEE ARTHROPLASTY  2016    Dr Stalin Rm    TUNNELED [address] Right 2020    tunneled HD catheter per Dr Teofilo Jones       Precautions:   Restrictions/Precautions: Fall Risk  Other position/activity restrictions: R Rib fractures - 10 and 11 per patient, dialysis port L chest, No Showering     Social/Functional Ambulating  Equipment Used: Standard toilet  Toilet Transfer: Supervision     Shower Transfers  Shower Transfers: Not tested  The "[employer]," Comments: pt does not shower d/t recent placement of port for dialysis    Orientation Status:  Orientation  Overall Orientation Status: Within Normal Limits    Cognition Status:  Cognition  Overall Cognitive Status: WFL    Perception Status:  Perception  Overall Perceptual Status: WFL    Sensation Status:  Sensation  Overall Sensation Status: Impaired (neuropathy in hands and feet)    Vision and Hearing Status:  Vision  Vision: Impaired  Vision Exceptions: Wears glasses for distance (patient reports she wears glassess for tv)  Hearing  Hearing: Within functional limits     UE Function Status:    ROM:   LUE AROM (degrees)  LUE AROM : Exceptions  LUE General AROM: limited shoulder internal rotation AND  flex/ext  Left Hand AROM (degrees)  Left Hand AROM: WFL  RUE AROM (degrees)  RUE AROM : Exceptions  RUE General AROM: limited shoulder internal rotation AND shoulder flex/ext  Right Hand AROM (degrees)  Right Hand AROM: WFL    Strength:  LUE Strength  Gross LUE Strength: WFL  LUE Strength Comment: grossly 3+/5  RUE Strength  Gross RUE Strength: WFL  RUE Strength Comment: grossly 3+/5    Coordination, Tone, Quality of Movement:    Tone RUE  RUE Tone: Normotonic  Tone LUE  LUE Tone: Normotonic  Coordination  Movements Are Fluid And Coordinated: No  Coordination and Movement Description: Gross motor impairments, Fine motor impairments, Right UE, Left UE  Quality of Movement Other  Comment: assist to manage fasteners and packages    D/C Recommendations:    Equipment Recommendations:  OT D/C Equipment  Equipment Needed: No    OT Follow Up:  OT D/C RECOMMENDATIONS  REQUIRES OT FOLLOW-UP: Yes  Type: Home OT    Home Exercise Program Provided: [x] Yes [] No  If yes, type of HEP: UE strengthening HEP, energy conservation     Electronically signed by:    Katelyn Noriega OTR/L, 7/15/2022, 4:13 PM

## 2022-07-15 NOTE — PROGRESS NOTES
OCCUPATIONAL THERAPY  INPATIENT REHAB TREATMENT NOTE  [address]      NAME: Joshua Fraga  : 1960 (7 y.o.)  MRN: [id number]  CODE STATUS: Full Code  Room: R248/R248-01    Date of Service: 7/15/2022    Referring Physician: Dr. Luana Huber  Rehab Diagnosis: impaired mobility and ADLs d/t polyneuropathy and recent fall    Restrictions  Restrictions/Precautions  Restrictions/Precautions: Fall Risk         Position Activity Restriction  Other position/activity restrictions: R Rib fractures - 10 and 11 per patient, dialysis port L chest, No Showering    Patient's date of birth confirmed: Yes    SAFETY:  Safety Devices  Safety Devices in place: Yes  Type of devices: All fall risk precautions in place    SUBJECTIVE:  Subjective: \" I dont have a weight. \"    Pain at start of treatment: No    Pain at end of treatment: No    Location:   Nursing notified: Not Applicable  RN:   Intervention: None    COGNITION:  Orientation  Overall Orientation Status: Within Normal Limits  Orientation Level: Oriented to place;Oriented to time;Oriented to person;Oriented to situation  Cognition  Overall Cognitive Status: WFL          OBJECTIVE:     Provided a quick re-education by discussion and demonstration for each BUE shoulder, bicep and wrist exercise with pt to make sure pt had an understanding of each exercise. Pt was told that a 16 oz can of soup/veggies/fruit can be used. Pt states she understood each exercise and the ones that she did not we went over again. Provided task to improve pt understanding and increase potential of pt follow through upon returning home to complete UE HEP. Provided a brief read through of energy conservation techniques. Discussed using a lever arm on the door instead of a door knob, using a water bottle by the the bed at night if she gets thirsty, instead of getting up in the middle of the night. Discussed using a converter kit to make lamps into touch lamps.   Provided education on the importance of picking up throw rugs that may used on the floor everyday to decrease risk of tripping/fall hazard. Suggested to pt to give the energy conservation list to her daughter and they could go through it together and see if any these ideas would benefit her. Pt stated she would. Also provided suggestion to use the time of day that she has the most energy for showering, doctor appts/dialysis. To use this time of day for the more energy consuming activities. Completed this task to improve energy conservation to increase functional quality of life for pt to increase functional task performance skills in a given day. Education: see objective       Equipment recommendations:n/a         ASSESSMENT:  Assessment: Pt completed tasks to maximum performance levels  Activity Tolerance: Patient tolerated treatment well      PLAN OF CARE:  Strengthening, Balance training, Functional mobility training, Endurance training, Safety education & training, Patient/Caregiver education & training, Equipment evaluation, education, & procurement, Self-Care / ADL, Home management training  continue with current POC until discharge of 7-17-22    Patient goals : work on strengthening my arms and legs, and improve balance so I don't fall           Therapy Time:   Individual Group Co-Treat   Time In 1400       Time Out 1410         Minutes 10                   Therapeutic activities: 10 minutes   Patient participated in above treatment session to complete previously scheduled minutes from 7/14/22. Electronically signed by:     MARY Dorna,   7/15/2022, 2:47 PM

## 2022-07-15 NOTE — PROGRESS NOTES
Occupational Therapy Get up and Go Note            Date: 7/15/2022  Patient Name: Alejo Moss        MRN: [id number]    Account #: [de-identified]  : 1960  (57 y.o.)      Subjective:  Patient states:  \" Dwain Beasley we can do that. \"  Pain:  Pain at start of treatment: Yes: 6/10    Pain at end of treatment: Yes: 6/10    Location: L knee  Nursing notified: Not Applicable      Objective:  ADL:  Feeding:  s/u  Grooming: Mod Ind  UE Self -Care:  Sup  LE Self-Care:  Min A  Toileting:  SBA  Toilet transfers:  Sup    Patient's current cognitive status is:  Comprehension: Ind  Expression: Ind  Social Interaction: Ind  Problem Solving: SBA  Memory: Mod Ind     Treatment consisted of:   [x] ADL Training  [] Strengthening   [] Transfer Training    [] DME Education  [] HEP   [] Patient Education  [] Other:    Safety:  Safety Devices  Safety Devices in place:   Type of devices: All fall risk precautions in place      Therapy Time:   Individual Group Co-Treat   Time In 0700       Time Out 0750         Minutes 50             ADL/IADL trainin minutes   Patient participated in above treatment session to complete previously scheduled minutes from 22. Electronically signed by:     MARY Navas    7/15/2022, 8:45 AM

## 2022-07-15 NOTE — CARE COORDINATION
HIPOLITO confirmed with Oley Bosworth at [facility] that pt's transportation will resume to dialysis on Tuesday 7/19.

## 2022-07-15 NOTE — PROGRESS NOTES
Physical Therapy Rehab Treatment Note  Facility/Department: [facility]  Room: D965/T481-82       NAME: Juliana Lovell  : 1960 (66 y.o.)  MRN: [id number]  CODE STATUS: Full Code    Date of Service: 7/15/2022       Restrictions:  Restrictions/Precautions: Fall Risk  Position Activity Restriction  Other position/activity restrictions: L Rib fractures - 10 and 11 per patient       SUBJECTIVE:   Subjective: I think I can walk pretty good today    Pain  Pain: 0/10 pain pre session      OBJECTIVE:     Sit to Stand  Assistance Level: Independent  Stand to Sit  Assistance Level: Independent  Bed To/From Chair  Assistance Level: Independent  Car Transfer  Assistance Level: Independent    Ambulation  Surface: Level surface; Uneven surface; Carpet; Ramp  Device: Rollator  Distance: 50'  Activity: Within Unit  Activity Comments: Reciprocal pattern good safety  Additional Factors: Verbal cues  Assistance Level: Independent  Gait Deviations: Slow jean      Curb  Curb Height: 4''  Device:  (Rollator)  Number of Curbs: 4  Additional Factors: Verbal cues; Set-up; Increased time to complete  Assistance Level: Stand by assist  Skilled Clinical Factors: Visual and verbal cues for proper technique. Increased effort to complete    Activity Tolerance  Activity Tolerance: Patient tolerated treatment well       ASSESSMENT/PROGRESS TOWARDS GOALS:   Assessment  Assessment: Curb step completed this p.m. with SBA. Patient requires cues for proper sequencing and locking breaks on rollator. Patient consistent with gait training and transfers meeting goals  Activity Tolerance: Patient tolerated treatment well  Discharge Recommendations: Continue to assess pending progress    Goals:  Long Term Goals  Long term goal 1: Indep bed mobility  Long term goal 2: Indep transfers bed to chair with safest assistive device  Long term goal 3: Ambulate with rollator Indep on level and ramp surfaces >/= 50' to allow safe return home.   Long term goal 4: Pt will demonstrate improved score on [place] balance assessment 48/56 for decreased risk for falls. PLAN OF CARE/Safety:   Safety Devices  Type of Devices: All fall risk precautions in place; Chair alarm in place      Therapy Time:   Individual   Time In 1330   Time Out 1400   Minutes 30     Minutes: 30  Transfer/Bed mobility training: 10  Gait training: Russ Mejia PTA, 07/15/22 at 3:30 PM

## 2022-07-15 NOTE — PROGRESS NOTES
OCCUPATIONAL THERAPY  INPATIENT REHAB TREATMENT NOTE  [facility]      NAME: Caroline Encinas  : 1960 (55 y.o.)  MRN: [id number]  CODE STATUS: Full Code  Room: R248/R248-01    Date of Service: 7/15/2022    Referring Physician: Dr. Yuriy Way  Rehab Diagnosis: impaired mobility and ADLs d/t polyneuropathy and recent fall    Restrictions  Restrictions/Precautions  Restrictions/Precautions: Fall Risk         Position Activity Restriction  Other position/activity restrictions: R Rib fractures - 10 and 11 per patient, dialysis port L chest, No Showering    Patient's date of birth confirmed: Yes    SAFETY:  Safety Devices  Safety Devices in place: Yes  Type of devices: All fall risk precautions in place    SUBJECTIVE:  Subjective: \" I put them on the hangar but my daughter hangs them\"    Pain at start of treatment: No    Pain at end of treatment: No    Location:   Nursing notified: Not Applicable  RN:   Intervention: None    COGNITION:  Orientation  Overall Orientation Status: Within Normal Limits  Orientation Level: Oriented to place;Oriented to time;Oriented to person;Oriented to situation  Cognition  Overall Cognitive Status: WFL          OBJECTIVE:     Pt completed hanging clothing on hangars seated. Provied pt with an semi easier way to fold pants and hand providing min A d/t BUE fatigue. Pt required rest breaks intermittently to complete task d/t UE fatigue from holding the hangar and then hanging the garment onto the hangar. Discussed with pt how pt how she does this at home. Pt states that she places the clothes on the hangar and then her daughter hangs her clothes up in the closet. Pt required extended time to complete this task d/t slow pacing d/t BUE fatigue and low endurance. Pt completed this task to increase functional IADL tasks.     Pt completed BUE strengthening HEP starting with 1# free wt, but demonstrating decreased ROM  with use of wt, therefore discontinued use of wt to improve active functional movement of BUE's during shoulder exercises. Pt was provided with instruction and demonstration, SBA provided also during task. Pt completed each shoulder, bicep and wrist exercise 5x1 set d/t time limitations for pt to understand each exercise. Pt completed task to increase strength, functional act tolerance, increase safety with transfers/mobility and decrease risk of functional decline upon returning home. Education: Pt educated on HEP for strengthening of BUE      Equipment recommendations: n/a         ASSESSMENT:  Assessment: Pt completed tasks to maximum performance levels  Activity Tolerance: Patient tolerated treatment well      PLAN OF CARE:  Strengthening, Balance training, Functional mobility training, Endurance training, Safety education & training, Patient/Caregiver education & training, Equipment evaluation, education, & procurement, Self-Care / ADL, Home management training  continue with current POC until discharge of 22    Patient goals : work on strengthening my arms and legs, and improve balance so I don't fall           Therapy Time:   Individual Group Co-Treat   Time In 1300       Time Out 1330         Minutes 30                   ADL/IADL trainin minutes  Therapeutic activities: 10 minutes     Electronically signed by:     MARY Mccormack,   7/15/2022, 2:40 PM

## 2022-07-16 LAB
ANION GAP SERPL CALCULATED.3IONS-SCNC: 16 MEQ/L (ref 9–15)
BUN BLDV-MCNC: 39 MG/DL (ref 8–23)
CALCIUM SERPL-MCNC: 9.6 MG/DL (ref 8.5–9.9)
CHLORIDE BLD-SCNC: 94 MEQ/L (ref 95–107)
CO2: 23 MEQ/L (ref 20–31)
CREAT SERPL-MCNC: 3.75 MG/DL (ref 0.5–0.9)
GFR AFRICAN AMERICAN: 14.7
GFR NON-AFRICAN AMERICAN: 12.1
GLUCOSE BLD-MCNC: 113 MG/DL (ref 70–99)
GLUCOSE BLD-MCNC: 136 MG/DL (ref 70–99)
GLUCOSE BLD-MCNC: 166 MG/DL (ref 70–99)
GLUCOSE BLD-MCNC: 213 MG/DL (ref 70–99)
GLUCOSE BLD-MCNC: 221 MG/DL (ref 70–99)
HEPATITIS B SURFACE ANTIGEN INTERPRETATION: NORMAL
PERFORMED ON: ABNORMAL
POTASSIUM SERPL-SCNC: 4.6 MEQ/L (ref 3.4–4.9)
SODIUM BLD-SCNC: 133 MEQ/L (ref 135–144)
VANCOMYCIN RANDOM: 17.1 UG/ML (ref 10–40)

## 2022-07-16 PROCEDURE — 1180000000 HC REHAB R&B

## 2022-07-16 PROCEDURE — 36415 COLL VENOUS BLD VENIPUNCTURE: CPT

## 2022-07-16 PROCEDURE — 80202 ASSAY OF VANCOMYCIN: CPT

## 2022-07-16 PROCEDURE — 6370000000 HC RX 637 (ALT 250 FOR IP): Performed by: PHYSICAL MEDICINE & REHABILITATION

## 2022-07-16 PROCEDURE — 6370000000 HC RX 637 (ALT 250 FOR IP): Performed by: INTERNAL MEDICINE

## 2022-07-16 PROCEDURE — 80048 BASIC METABOLIC PNL TOTAL CA: CPT

## 2022-07-16 PROCEDURE — 6360000002 HC RX W HCPCS: Performed by: INTERNAL MEDICINE

## 2022-07-16 PROCEDURE — 99232 SBSQ HOSP IP/OBS MODERATE 35: CPT | Performed by: PHYSICAL MEDICINE & REHABILITATION

## 2022-07-16 PROCEDURE — 87340 HEPATITIS B SURFACE AG IA: CPT

## 2022-07-16 RX ORDER — HYDROXYZINE HYDROCHLORIDE 25 MG/1
25 TABLET, FILM COATED ORAL 3 TIMES DAILY PRN
Qty: 90 TABLET | Refills: 5 | Status: SHIPPED | OUTPATIENT
Start: 2022-07-16 | End: 2022-07-26

## 2022-07-16 RX ORDER — ATORVASTATIN CALCIUM 40 MG/1
40 TABLET, FILM COATED ORAL NIGHTLY
Qty: 30 TABLET | Refills: 5 | Status: SHIPPED | OUTPATIENT
Start: 2022-07-16

## 2022-07-16 RX ORDER — CHOLECALCIFEROL (VITAMIN D3) 50 MCG
2000 TABLET ORAL
Qty: 60 TABLET | Refills: 5 | Status: SHIPPED | OUTPATIENT
Start: 2022-07-16

## 2022-07-16 RX ORDER — TRAMADOL HYDROCHLORIDE 50 MG/1
25 TABLET ORAL EVERY 6 HOURS PRN
Qty: 40 TABLET | Refills: 0 | Status: SHIPPED | OUTPATIENT
Start: 2022-07-16 | End: 2022-07-30

## 2022-07-16 RX ADMIN — FUROSEMIDE 40 MG: 40 TABLET ORAL at 09:08

## 2022-07-16 RX ADMIN — ASPIRIN 81 MG: 81 TABLET, COATED ORAL at 09:08

## 2022-07-16 RX ADMIN — SERTRALINE 50 MG: 25 TABLET, FILM COATED ORAL at 20:46

## 2022-07-16 RX ADMIN — ACETAMINOPHEN 500 MG: 500 TABLET ORAL at 04:52

## 2022-07-16 RX ADMIN — TRAMADOL HYDROCHLORIDE 25 MG: 50 TABLET, COATED ORAL at 04:52

## 2022-07-16 RX ADMIN — ACETAMINOPHEN 500 MG: 500 TABLET ORAL at 20:46

## 2022-07-16 RX ADMIN — INSULIN GLARGINE 25 UNITS: 100 INJECTION, SOLUTION SUBCUTANEOUS at 20:57

## 2022-07-16 RX ADMIN — Medication 10 MG: at 20:46

## 2022-07-16 RX ADMIN — ACETAMINOPHEN 500 MG: 500 TABLET ORAL at 14:21

## 2022-07-16 RX ADMIN — Medication 2000 UNITS: at 17:23

## 2022-07-16 RX ADMIN — TRAMADOL HYDROCHLORIDE 25 MG: 50 TABLET, COATED ORAL at 14:21

## 2022-07-16 RX ADMIN — INSULIN LISPRO 2 UNITS: 100 INJECTION, SOLUTION INTRAVENOUS; SUBCUTANEOUS at 20:55

## 2022-07-16 RX ADMIN — POLYETHYLENE GLYCOL 3350 17 G: 17 POWDER, FOR SOLUTION ORAL at 22:00

## 2022-07-16 RX ADMIN — INSULIN LISPRO 4 UNITS: 100 INJECTION, SOLUTION INTRAVENOUS; SUBCUTANEOUS at 11:56

## 2022-07-16 RX ADMIN — ARIPIPRAZOLE 5 MG: 5 TABLET ORAL at 09:07

## 2022-07-16 RX ADMIN — ATORVASTATIN CALCIUM 40 MG: 40 TABLET, FILM COATED ORAL at 20:46

## 2022-07-16 RX ADMIN — Medication 100 MG: at 09:07

## 2022-07-16 RX ADMIN — ALTEPLASE 1 MG: 2.2 INJECTION, POWDER, LYOPHILIZED, FOR SOLUTION INTRAVENOUS at 17:21

## 2022-07-16 RX ADMIN — POLYETHYLENE GLYCOL 3350 17 G: 17 POWDER, FOR SOLUTION ORAL at 20:45

## 2022-07-16 RX ADMIN — ISOSORBIDE MONONITRATE 120 MG: 60 TABLET, EXTENDED RELEASE ORAL at 09:08

## 2022-07-16 RX ADMIN — TRAMADOL HYDROCHLORIDE 25 MG: 50 TABLET, COATED ORAL at 20:45

## 2022-07-16 RX ADMIN — Medication 400 MG: at 09:08

## 2022-07-16 ASSESSMENT — PAIN DESCRIPTION - PAIN TYPE: TYPE: CHRONIC PAIN

## 2022-07-16 ASSESSMENT — PAIN SCALES - GENERAL
PAINLEVEL_OUTOF10: 4
PAINLEVEL_OUTOF10: 7

## 2022-07-16 ASSESSMENT — PAIN DESCRIPTION - LOCATION: LOCATION: BACK;KNEE

## 2022-07-16 ASSESSMENT — PAIN DESCRIPTION - ORIENTATION: ORIENTATION: LEFT

## 2022-07-16 ASSESSMENT — PAIN - FUNCTIONAL ASSESSMENT: PAIN_FUNCTIONAL_ASSESSMENT: ACTIVITIES ARE NOT PREVENTED

## 2022-07-16 ASSESSMENT — PAIN DESCRIPTION - DESCRIPTORS: DESCRIPTORS: ACHING

## 2022-07-16 ASSESSMENT — ENCOUNTER SYMPTOMS: GASTROINTESTINAL NEGATIVE: 1

## 2022-07-16 NOTE — PROGRESS NOTES
Depression     Chronic obstructive pulmonary disease (HCC)     Critical illness polyneuropathy ([facility])     Multiple closed fractures of ribs of right side     Nonrheumatic mitral valve regurgitation     Nonrheumatic tricuspid valve regurgitation     Need for extended care facility     Chronic pain of both shoulders     Hemodialysis-associated hypotension     Anginal chest pain at rest [facility])     Chest pain     Unstable angina ([facility])     NSTEMI (non-ST elevated myocardial infarction) ([facility])     CKD (chronic kidney disease) stage 4, GFR 15-29 ml/min ([facility])     Hyperkalemia     Impaired mobility and activities of daily living dt polyneuropathy and reccent fall      Dialysis patient [facility])     Unspecified open wound of right upper arm, initial encounter     Multiple closed fractures of right lower extremity and ribs     Closed T11 fracture ([facility])     Encephalopathy acute     MRSA bacteremia     Sepsis due to Enterococcus ([address]     Local infection due to central venous catheter     DJD (degenerative joint disease), cervical     Closed head injury     Sarcopenia     Fall from standing     Septicemia ([address]     Chronic hepatitis C ([address]     Catheter-related bloodstream infection     Enterococcus faecalis infection     Cervical stenosis of spinal canal     Ataxic gait     Lumbar stenosis with neurogenic claudication     Falls infrequently      Subjective:  Admit Date: 7/9/2022    Interval History: no new issues    Medications:  Scheduled Meds:   insulin glargine  25 Units SubCUTAneous Nightly    furosemide  40 mg Oral Daily    traMADol  25 mg Oral 3 times per day    acetaminophen  500 mg Oral 3 times per day    Vitamin D  2,000 Units Oral Dinner    cyanocobalamin  1,000 mcg IntraMUSCular Weekly    coenzyme Q10  100 mg Oral Daily    ARIPiprazole  5 mg Oral Daily    aspirin EC  81 mg Oral Daily    atorvastatin  40 mg Oral Nightly    insulin lispro  0-12 Units SubCUTAneous TID WC    insulin lispro  0-6 Units SubCUTAneous Nightly isosorbide mononitrate  120 mg Oral Daily    lidocaine  3 patch TransDERmal Daily    magnesium oxide  400 mg Oral Daily    melatonin  10 mg Oral Nightly    midodrine  5 mg Oral Once per day on Mon Wed Fri    polyethylene glycol  17 g Oral Daily    sertraline  50 mg Oral Nightly    vancomycin (VANCOCIN) intermittent dosing (placeholder)   Other RX Placeholder     Continuous Infusions:   dextrose         CBC:   Recent Labs     07/14/22  0500   WBC 8.4   HGB 9.2*        CMP:    Recent Labs     07/14/22  0500   *   K 5.6*   CL 93*   CO2 21   BUN 38*   CREATININE 3.62*   GLUCOSE 178*   CALCIUM 10.2*   LABGLOM 12.6*     Troponin: No results for input(s): TROPONINI in the last 72 hours. BNP: No results for input(s): BNP in the last 72 hours. INR: No results for input(s): INR in the last 72 hours. Lipids: No results for input(s): CHOL, LDLDIRECT, TRIG, HDL, AMYLASE, LIPASE in the last 72 hours. Liver: No results for input(s): AST, ALT, ALKPHOS, PROT, LABALBU, BILITOT in the last 72 hours. Invalid input(s): BILDIR  Iron:  No results for input(s): IRONS, FERRITIN in the last 72 hours. Invalid input(s): LABIRONS  Urinalysis: No results for input(s): UA in the last 72 hours.     Objective:  Vitals: BP (!) 124/58   Pulse 77   Temp 97.7 °F (36.5 °C)   Resp 18   Ht 5' 7\" (1.702 m)   Wt 194 lb 3.6 oz (88.1 kg)   LMP  (LMP Unknown)   SpO2 100%   BMI 30.42 kg/m²    Wt Readings from Last 3 Encounters:   07/14/22 194 lb 3.6 oz (88.1 kg)   07/08/22 189 lb 9.5 oz (86 kg)   06/22/22 217 lb (98.4 kg)      24HR INTAKE/OUTPUT:  No intake or output data in the 24 hours ending 07/16/22 1024    General: alert, in no apparent distress  HEENT: normocephalic, atraumatic, anicteric  Neck: supple, no mass  Lungs: non-labored respirations, clear to auscultation bilaterally  Heart: regular rate and rhythm, no murmurs or rubs  Abdomen: soft, non-tender, non-distended  Ext: no cyanosis, no peripheral edema  Neuro: alert and oriented, no gross abnormalities  Psych: normal mood and affect  Skin: no rash      Electronically signed by Katlyn Greenwood DO, MD

## 2022-07-16 NOTE — PROGRESS NOTES
[facility] Occupational Therapy      Date: 2022  Patient Name: Supriya Barnett        MRN: [id number]  Account: [de-identified]   : 1960  (57 y.o.)  Room: [place] [address]    Chart reviewed, attempted OT at 1030 for 30 minutes. Patient not seen 2° to:    Attempted to make up missed minutes from earlier this morning. Pt declined therapy at this time reporting fatigue and that she did not sleep well last night. Will attempt again when able.     Electronically signed by MARY Pittman on 2022 at 10:33 AM

## 2022-07-16 NOTE — PROGRESS NOTES
Patient is resting in bed with some c/o pain to her lower back and was medicated with routine pain meds which were effective. Patient has been incontinent of urine and is not requesting help to use the restroom. This nurse reminded the patient that she is on a toileting- program and that nursing staff is to assist her to the restroom Q 2 hours. She was also reminded that if she is incontinent, to alert staff right away and to not sit in a soiled brief. Mely care provided and barrier cream applied.

## 2022-07-16 NOTE — PROGRESS NOTES
Physical Therapy Missed Treatment   Facility/Department: [facility] MED SURG W587/A892-06    NAME: Florence Cary  Patient Status:   : 1960 (60 y.o.)  MRN: [id number]  Account: [de-identified]  Gender: female        [x] Patient Declines PT Treatment            [] Patient Unavailable:     Pt reports not sleeping well last night and is too tired to come to therapy. States she won't be able to stay awake. Pt has dialysis at 2 pm this date. Will attempt PT Treatment again at earliest convenience.         Electronically signed by Yoselin Richardson PTA on 22 at 8:49 AM EDT

## 2022-07-16 NOTE — FLOWSHEET NOTE
Patient resting quietly in bed at this time. Dialysis unable to be completed today due to problems with her vascular access. According to dialysis nurse, dialysis will have to be on Monday instead of Tuesday. Patient expressed concern regarding not having transportation. Will speak with Dr. Klarissa Bragg to try to resolve the issue. Denies any further needs or concerns at this time. Call light within reach.

## 2022-07-16 NOTE — PLAN OF CARE
Problem: Discharge Planning  Goal: Discharge to home or other facility with appropriate resources  Outcome: Progressing     Problem: Safety - Adult  Goal: Free from fall injury  7/16/2022 1249 by Zac Castillo RN  Outcome: Progressing  7/15/2022 2344 by Vinh Macias RN  Outcome: Progressing     Problem: Skin/Tissue Integrity  Goal: Absence of new skin breakdown  Description: 1. Monitor for areas of redness and/or skin breakdown  2. Assess vascular access sites hourly  3. Every 4-6 hours minimum:  Change oxygen saturation probe site  4. Every 4-6 hours:  If on nasal continuous positive airway pressure, respiratory therapy assess nares and determine need for appliance change or resting period.   7/16/2022 1249 by Zac Castillo RN  Outcome: Progressing  7/15/2022 2344 by Vinh Macias RN  Outcome: Progressing     Problem: ABCDS Injury Assessment  Goal: Absence of physical injury  Outcome: Progressing     Problem: Nutrition Deficit:  Goal: Optimize nutritional status  Outcome: Progressing     Problem: Chronic Conditions and Co-morbidities  Goal: Patient's chronic conditions and co-morbidity symptoms are monitored and maintained or improved  Outcome: Progressing

## 2022-07-16 NOTE — PROGRESS NOTES
Subjective: The patient complains of  moderate to severe acute      partially relieved by rest, PT, OT, rest, pain medications and exacerbated by recent illness and exertion. Continues to have pain from her multiple rib fractures and is recovering from bilateral pleural effusions and balancing cardiac with renal risks. Nephrology is consulting and there titrating Lasix with caution. She is getting hemodialysis. I am concerned about patients medical complexities and current active problems and barriers to progress including hx of hep C and paranoid schitzophrenia. He is hoping to trial Bengay and heating pad for her achy muscles. Patient is getting nauseated when she is due for her dialysis due to toxic meds tabulate built up. She is responding to dialysis and Tigan. We also discussed her renal osteodystrophy pain and scheduling her Ultram every 8 hours she is in agreement. She does not feel that she is on the right dose of insulin and we will consult Dr. Loreto Sanders. Podiatry was consulted for long toenails and ichthyosis. I have adjusted her vancomycin level checks. She is for hemodialysis on Saturday and discharge home on Sunday. Dr. Antonio Whitaker and nephrology will work on her Lasix dose. They are balancing her renal and cardiac issues. He is hoping to have home health care at discharge and then transition to outpatient therapy. I discussed current functional, rehabilitation, medical status with other rehabilitation providers including nursing and case management. According to recent nursing note, \" Patient is resting in bed with some c/o pain to her lower back and was medicated with routine pain meds which were effective. Patient has been incontinent of urine and is not requesting help to use the restroom. This nurse reminded the patient that she is on a toileting- program and that nursing staff is to assist her to the restroom Q 2 hours.  She was also reminded that if she is incontinent, to alert staff right away and to not sit in a soiled brief. Mely care provided and barrier cream applied. \". She is requesting tramadol as an outpatient for her acute on chronic pain she has acute rib fractures and chronic pain from renal osteodystrophy. ROS x10: The patient also complains of severely impaired mobility and activities of daily living. Otherwise no new problems with vision, hearing, nose, mouth, throat, dermal, cardiovascular, GI, , pulmonary, musculoskeletal, psychiatric or neurological. See Rehab H&P on Rehab chart dated . Vital signs:  /65   Pulse 78   Temp 98.1 °F (36.7 °C) (Oral)   Resp 18   Ht 5' 7\" (1.702 m)   Wt 194 lb 3.6 oz (88.1 kg)   LMP  (LMP Unknown)   SpO2 98%   BMI 30.42 kg/m²   I/O:   PO/Intake:  fair PO intake, no problems observed or reported. Bowel/Bladder:  incontinent,Purwic constipation and urinary urgency. Last BM 7/9/22. General:  Patient is well developed, adequately nourished, non-obese and     well kempt. HEENT:    PERRLA, hearing intact to loud voice, external inspection of ear     and nose benign. Inspection of lips, tongue and gums  No teeth  Musculoskeletal: No significant change in strength or tone. All joints stable. Inspection and palpation of digits and nails show no clubbing,       cyanosis or inflammatory conditions. Neuro/Psychiatric: Affect: flat. Alert and oriented to person, place and     situation. No significant change in deep tendon reflexes or     Sensation    Lungs:  Diminished, CTA-B. Respiration effort is normal at rest.   fractured right 10-11 th ribs. tunneled Vascath left upper    chest with 2 ports. Heart:   S1 = S2, RRR. No loud murmurs. Abdomen:  Soft, non-tender, no enlargement of liver or spleen. Extremities:  Trace lower extremity edema,    tenderness. dialysis fistula graft to right upper arm that is not in use  Skin:   Intact   - discolored and bruised . ruise to back of right shoulder and right need to get her prescriptions in the future from pain management either myself or other pain management in the area. Skin healing and breakdown risk:  continue pressure relief program.  Daily skin exams and reports from nursing. Add co-Q10  Severe fatigue due to nutritional and hydration deficiency: Add vitamin B12 vitamin D and CoQ10 continue to monitor I&Os, calorie counts prn, dietary consult prn. Add healthy HS snack. Acute episodic insomnia with situational adjustment disorder:  consider prn low dose Ambien, monitor for day time sedation. Add HS \"Tuck In\"  Falls risk elevated:  patient to use call light to get nursing assistance to get up, bed and chair alarm. Elevated DVT risk: progressive activities in PT, continue prophylaxis MAHAMED hose, elevation and avoid Lovenox due to renal failure. Complex discharge planning:    Begin medication simplification patient and family education as we transition from to planned pending discharge July 17, 2020 to home with her daughter who is her hired caregiver with follow-up hemodialysis friends  -Tuesday Thursday and Saturdays as well as home health care-eventually transitioning to outpatient therapy. Weekly team meeting  every Monday to re-assess progress towards goals, discuss and address social, psychological and medical comorbidities and to address difficulties they may be having progressing in therapy. Patient and family education is in progress. The patient is to follow-up with their family physician after discharge. Complex Active General Medical Issues that complicate care Assess & Plan:     1. Principal Problem:    Impaired mobility and activities of daily living dt polyneuropathy and reccent fall   Active Problems:  Chronic CHF, CKD (chronic kidney disease) stage 4, GFR 15-29 ml/min,   ESRD (end stage renal disease) on dialysis, Patient is getting nauseated when she is due for her dialysis due to toxic meds tabulate built up.   She is responding to dialysis and Tigan. We also discussed her renal osteodystrophy pain and scheduling her Ultram every 8 hours she is in agreement. Consult nephrology. Titrate Lasix-balance cardiac and renal risk    Closed T11 fracture renal osteodystrophy,   Multiple closed fractures of ribs of right side,   Chest wall contusion, left -Lidoderm, vitamin D, abdominal binder as tolerated    Encephalopathy acute,   Cerebral microvascular disease-limit toxic medications    MRSA bacteremia with Septicemia -IV vancomycin consult pharmacy for dosing monitor renal function and adjust dose. Chronic hepatitis C-eliminate toxic medications    Vitamin B 12 deficiency, Vitamin D insufficiency-Add high-dose vitamin D, recheck vitamin D level out after discharge,  Now with low blood pressure but history of essential (primary) hypertension,   Chronic diastolic congestive heart failure-Acute rehab to monitor heart rate and rhythm with the option of telemetry and the effects of chronotropic medication with respect to increasing physical activity and exercise in PT, OT, ADLs with medication titration to lowest effective dosing. Continue blood signs every shift focusing on heart rate, rhythm and blood pressure checks with orthostatic checks-monitoring the effect of exercise, therapy and posture. Consult hospitalist for backup medical and adjust/add medications (aspirin, Imdur, Lipitor, ProAmatine). Monitor heart rate and blood pressure as well as medications effects on vital signs before during and after therapy with especial focus on preventing orthostasis and falls risk.     Controlled type 2 diabetes mellitus with diabetic neuropathy, with long-term current use of insulin-Continue blood sugar checks every shift, diet, add diabetic add dietary education, restrict carbohydrates to lowest effective and safe carb count per meal advising 4 carbs per meal, add at bedtime snack to prevent a.m. hypoglycemia, adjust/add medications (Lantus, sliding scale insulin)     Pulmonary hypertension -Acute rehab for endurance traing with Pulse Ox to monitoring oxygen saturation and heart rate with O2 titration to lowest effective dose. Pulse oximeter checks to shift and at HS to dose and titrate oxygen and aerosol treatments monitor for nocturnal hypoxemia, monitor vital signs, oxygen prn. Focus on energy conservation. Paranoid schizophrenia -emotional support provided daily, vitamin B12, encourage participation in rehabilitation support group and recreational therapy, adjust/add medications -patient had been on Zoloft at home. Focus of today's plan-  Initiate and modify therapuetic plan to meet patients individual needs, add rest breaks as needed  -consider resuming SSRI. Patient is preparing for her discharge on the 17th she will have dialysis on the 16th.       David Lorenz D.O., PM&R     Attending    [facility]

## 2022-07-16 NOTE — PROGRESS NOTES
[facility] Occupational Therapy      Date: 2022  Patient Name: Edwina Little        MRN: [id number]  Account: [de-identified]   : 1960  (57 y.o.)  Room: [place] [address]    Chart reviewed, attempted OT at 15:20 for 30 make up missed time minutes. Patient not seen 2° to:    Pt. declined, stating: dialysis just called and their on their way to get me. I was supposed to go at 2:00. Maybe on  I can do it. I'd like to try    Spoke to FLAQUITO Cortés RN aware. Will attempt again when able.     Electronically signed by MARY Temple on 2022 at 3:34 PM

## 2022-07-17 LAB
GLUCOSE BLD-MCNC: 113 MG/DL (ref 70–99)
GLUCOSE BLD-MCNC: 145 MG/DL (ref 70–99)
GLUCOSE BLD-MCNC: 153 MG/DL (ref 70–99)
GLUCOSE BLD-MCNC: 167 MG/DL (ref 70–99)
PERFORMED ON: ABNORMAL
VANCOMYCIN RANDOM: 14.1 UG/ML (ref 10–40)

## 2022-07-17 PROCEDURE — 6360000002 HC RX W HCPCS: Performed by: INTERNAL MEDICINE

## 2022-07-17 PROCEDURE — 80202 ASSAY OF VANCOMYCIN: CPT

## 2022-07-17 PROCEDURE — 97112 NEUROMUSCULAR REEDUCATION: CPT

## 2022-07-17 PROCEDURE — 36415 COLL VENOUS BLD VENIPUNCTURE: CPT

## 2022-07-17 PROCEDURE — 6370000000 HC RX 637 (ALT 250 FOR IP): Performed by: INTERNAL MEDICINE

## 2022-07-17 PROCEDURE — 2580000003 HC RX 258: Performed by: INTERNAL MEDICINE

## 2022-07-17 PROCEDURE — 6370000000 HC RX 637 (ALT 250 FOR IP): Performed by: PHYSICAL MEDICINE & REHABILITATION

## 2022-07-17 PROCEDURE — 99233 SBSQ HOSP IP/OBS HIGH 50: CPT | Performed by: PHYSICAL MEDICINE & REHABILITATION

## 2022-07-17 PROCEDURE — 1180000000 HC REHAB R&B

## 2022-07-17 PROCEDURE — 97116 GAIT TRAINING THERAPY: CPT

## 2022-07-17 PROCEDURE — 6360000002 HC RX W HCPCS: Performed by: PHYSICAL MEDICINE & REHABILITATION

## 2022-07-17 RX ADMIN — SODIUM ZIRCONIUM CYCLOSILICATE 10 G: 5 POWDER, FOR SUSPENSION ORAL at 08:52

## 2022-07-17 RX ADMIN — ARIPIPRAZOLE 5 MG: 5 TABLET ORAL at 08:52

## 2022-07-17 RX ADMIN — Medication 400 MG: at 08:52

## 2022-07-17 RX ADMIN — ISOSORBIDE MONONITRATE 120 MG: 60 TABLET, EXTENDED RELEASE ORAL at 08:52

## 2022-07-17 RX ADMIN — ASPIRIN 81 MG: 81 TABLET, COATED ORAL at 08:52

## 2022-07-17 RX ADMIN — ACETAMINOPHEN 500 MG: 500 TABLET ORAL at 05:37

## 2022-07-17 RX ADMIN — TRAMADOL HYDROCHLORIDE 25 MG: 50 TABLET, COATED ORAL at 21:54

## 2022-07-17 RX ADMIN — ACETAMINOPHEN 500 MG: 500 TABLET ORAL at 21:48

## 2022-07-17 RX ADMIN — Medication 2000 UNITS: at 17:28

## 2022-07-17 RX ADMIN — ATORVASTATIN CALCIUM 40 MG: 40 TABLET, FILM COATED ORAL at 21:49

## 2022-07-17 RX ADMIN — Medication 100 MG: at 08:52

## 2022-07-17 RX ADMIN — ACETAMINOPHEN 500 MG: 500 TABLET ORAL at 14:02

## 2022-07-17 RX ADMIN — FUROSEMIDE 40 MG: 40 TABLET ORAL at 08:52

## 2022-07-17 RX ADMIN — INSULIN LISPRO 2 UNITS: 100 INJECTION, SOLUTION INTRAVENOUS; SUBCUTANEOUS at 12:36

## 2022-07-17 RX ADMIN — INSULIN LISPRO 2 UNITS: 100 INJECTION, SOLUTION INTRAVENOUS; SUBCUTANEOUS at 17:28

## 2022-07-17 RX ADMIN — TRAMADOL HYDROCHLORIDE 25 MG: 50 TABLET, COATED ORAL at 14:02

## 2022-07-17 RX ADMIN — POLYETHYLENE GLYCOL 3350 17 G: 17 POWDER, FOR SOLUTION ORAL at 22:12

## 2022-07-17 RX ADMIN — SERTRALINE 50 MG: 25 TABLET, FILM COATED ORAL at 21:48

## 2022-07-17 RX ADMIN — INSULIN GLARGINE 25 UNITS: 100 INJECTION, SOLUTION SUBCUTANEOUS at 21:49

## 2022-07-17 RX ADMIN — INSULIN LISPRO 1 UNITS: 100 INJECTION, SOLUTION INTRAVENOUS; SUBCUTANEOUS at 21:53

## 2022-07-17 RX ADMIN — CYANOCOBALAMIN 1000 MCG: 1000 INJECTION, SOLUTION INTRAMUSCULAR; SUBCUTANEOUS at 08:52

## 2022-07-17 RX ADMIN — Medication 10 MG: at 21:48

## 2022-07-17 RX ADMIN — VANCOMYCIN HYDROCHLORIDE 1500 MG: 1.5 INJECTION, POWDER, LYOPHILIZED, FOR SOLUTION INTRAVENOUS at 15:58

## 2022-07-17 RX ADMIN — TRAMADOL HYDROCHLORIDE 25 MG: 50 TABLET, COATED ORAL at 05:37

## 2022-07-17 ASSESSMENT — PAIN DESCRIPTION - LOCATION
LOCATION: BACK;KNEE
LOCATION_2: BACK
LOCATION: KNEE;BACK
LOCATION: BACK
LOCATION: KNEE

## 2022-07-17 ASSESSMENT — PAIN DESCRIPTION - FREQUENCY: FREQUENCY: CONTINUOUS

## 2022-07-17 ASSESSMENT — PAIN DESCRIPTION - PAIN TYPE
TYPE: CHRONIC PAIN
TYPE: CHRONIC PAIN

## 2022-07-17 ASSESSMENT — PAIN SCALES - GENERAL
PAINLEVEL_OUTOF10: 5
PAINLEVEL_OUTOF10: 4
PAINLEVEL_OUTOF10: 6
PAINLEVEL_OUTOF10: 4

## 2022-07-17 ASSESSMENT — PAIN DESCRIPTION - DESCRIPTORS
DESCRIPTORS: ACHING
DESCRIPTORS: ACHING
DESCRIPTORS_2: ACHING
DESCRIPTORS: ACHING;SHARP

## 2022-07-17 ASSESSMENT — PAIN DESCRIPTION - ORIENTATION
ORIENTATION: LEFT
ORIENTATION_2: RIGHT;LOWER

## 2022-07-17 ASSESSMENT — PAIN - FUNCTIONAL ASSESSMENT: PAIN_FUNCTIONAL_ASSESSMENT: ACTIVITIES ARE NOT PREVENTED

## 2022-07-17 NOTE — PROGRESS NOTES
Pharmacy Vancomycin Consult     Vancomycin Day: 17  Current Dosing: Post HD dosing      Recent Labs     07/16/22  1756   BUN 39*   CREATININE 3.75*       Intake/Output Summary (Last 24 hours) at 7/17/2022 1407  Last data filed at 7/16/2022 1636  Gross per 24 hour   Intake 400 ml   Output 414 ml   Net -14 ml     Cultures  Recent Labs     07/10/22  0451 07/09/22  1611 07/01/22  1451 06/30/22  0810   BC  --   --  No growth after 5 days of incubation. Gram stain aerobic bottle  Gram stain anaerobic bottle  Gram positive cocci in chains and pairs- resembling Strep  2 out of 2 blood cultures  Further results to follow  Further testing performed at [address] [place] [address]  --   --  No growth after 5 days of incubation. Gram stain aerobic bottle  Gram stain anaerobic bottle  Gram positive cocci in chains and pairs- resembling Strep  2 out of 2 blood cultures  Further results to follow  Further testing performed at [facility]  --   --   --  Enterococcus faecalis*  Staphylococcus epidermidis*  Enterococcus faecalis*  Staphylococcus epidermidis*   LABURIN Cult,Urine:  NO SIGNIFICANT GROWTH  Performed at [address] [place] [address]  [phone]    --   --   --    COVID19  --  Not Detected  --  Not Detected     Height: 5' 7\" (170.2 cm), Weight: 193 lb 12.6 oz (87.9 kg), Body mass index is 30.35 kg/m². Estimated Creatinine Clearance: 17 mL/min (A) (based on SCr of 3.75 mg/dL (H)). Hemodialysis Intake (ml): 400 mlDialyzer Clearance: Moderately streaked. Trough:  Recent Labs     07/17/22  0420   VANCORANDOM 14.1      Assessment/Plan:    Per RN HD was not done today planned tomorrow. Will still give dose today as patient last dose was Thursday and level low. Will reschedule random prior to HD tomorrow    Pre-HD level today slightly sub-therapeutic. Will dose 1x post HD dose of 1500mg. Patient normal schedule TRSat.  Will plan for repeat random prior to HD on tuesday Timing of future trough levels may be adjusted based on culture results, renal function, and clinical response.     Thank you,   Sp Young, [facility] PharmD

## 2022-07-17 NOTE — PROGRESS NOTES
Pharmacy Vancomycin Consult     Vancomycin Day: 16  Current Dosing: POST HD dosing    Recent Labs     07/14/22  0500 07/16/22  1756   BUN 38* 39*   CREATININE 3.62* 3.75*   WBC 8.4  --        Intake/Output Summary (Last 24 hours) at 7/16/2022 1925  Last data filed at 7/16/2022 1636  Gross per 24 hour   Intake 400 ml   Output 414 ml   Net -14 ml     Cultures  Recent Labs     07/10/22  0451 07/09/22  1611 07/01/22  1451 06/30/22  0810   BC  --   --  No growth after 5 days of incubation. Gram stain aerobic bottle  Gram stain anaerobic bottle  Gram positive cocci in chains and pairs- resembling Strep  2 out of 2 blood cultures  Further results to follow  Further testing performed at [address] [place] [address]  --   --  No growth after 5 days of incubation. Gram stain aerobic bottle  Gram stain anaerobic bottle  Gram positive cocci in chains and pairs- resembling Strep  2 out of 2 blood cultures  Further results to follow  Further testing performed at [facility]  --   --   --  Enterococcus faecalis*  Staphylococcus epidermidis*  Enterococcus faecalis*  Staphylococcus epidermidis*   LABURIN Cult,Urine:  NO SIGNIFICANT GROWTH  Performed at [address] [place] [address], [address] [place] [address]  [phone]    --   --   --    COVID19  --  Not Detected  --  Not Detected     Height: 5' 7\" (170.2 cm), Weight: 193 lb 12.6 oz (87.9 kg), Body mass index is 30.35 kg/m². Estimated Creatinine Clearance: 17 mL/min (A) (based on SCr of 3.75 mg/dL (H)). Hemodialysis Intake (ml): 400 mlDialyzer Clearance: Moderately streaked. Trough:  Recent Labs     07/16/22  0503   VANCORANDOM 17.1      Assessment/Plan:  Dialysis not done today d/t vascular access. Plan is for HD on Monday. Will repeat a level tomorrow am to assess if additional dose needed prior to dose POST HD Monday. Timing of future trough levels may be adjusted based on culture results, renal function, and clinical response.     Thank

## 2022-07-17 NOTE — DISCHARGE SUMMARY
Subjective: The patient complains of  moderate to severe acute      partially relieved by rest, PT, OT, rest, pain medications and exacerbated by recent illness and exertion. Continues to have pain from her multiple rib fractures and is recovering from bilateral pleural effusions and balancing cardiac with renal risks. Nephrology is consulting and there titrating Lasix with caution. She is getting hemodialysis. I am concerned about patients medical complexities and current active problems and barriers to progress including hx of hep C and paranoid schitzophrenia. Overall she did very well in therapy program was able to tolerate hemodialysis as well as her therapy she has renal osteodystrophy as well as polyneuropathy and recent falling. She has multiple rib fractures she required acute on chronic pain management and did well with Ultram which she wants to stay on as an outpatient. She does not feel that she is on the right dose of insulin and we will consult Dr. Rene Verma. Podiatry was consulted for long toenails and ichthyosis. I have adjusted her vancomycin level checks. She is for hemodialysis on Saturday and discharge home on Sunday. Dr. Fabrice Qureshi and nephrology will work on her Lasix dose. They are balancing her renal and cardiac issues. He is hoping to have home health care at discharge and then transition to outpatient therapy. [address] [address] Course: The patient was admitted to the Rehabilitation Unit to address ADL and mobility deficits-as detailed above and below. The patient was enrolled in acute PT, OT program.  Weekly team meetings were held to assess functional progress toward their goals-and modify the therapy program.  The patient's medical, emotional, psychosocial and functional issues were addressed. The patient progressed in the rehab program and is now ready for discharge home. Refer to functional assessments summary report for detailed functional status.   Refer to the medical problem list below to see the medical issues addressed. The social and DC complexities are detailed in the DC planning section below. Greater than 35 minutes was spent on coordinating patients discharge including follow-up care, medications and patient/family education. Extended time needed because of the potential use of controlled medications are high risk medications and a high risk population individual.  Patient and family were instructed to use lowest effective dose of these medications and slowly titrate off over the next 2 to 4 weeks. They are not to combine opiates with sedatives. I reviewed her [facility] prescription monitoring service data sheets in hopes of eliminating polypharmacy and weaning to the lowest effective dose of pain medications and eliminating the concomitant use of benzodiazepines. I see   no medications of concern. I see   no habits of combining sedatives and narcotics. I discussed current functional, rehabilitation, medical status with other rehabilitation providers including nursing and case management. According to recent nursing note, \"  Patient was upx2 to BR to urinate. Up with w/w with SBA- pt steady and mindful of safety and surroundings. Spoke with daughter via phone answering some questions of pt progress. Also expressed concern on mother's vasc cath continuing to clog and when ok to put new fistula- will advise day RN\". She is requesting tramadol as an outpatient for her acute on chronic pain she has acute rib fractures and chronic pain from renal osteodystrophy. ROS x10: The patient also complains of severely impaired mobility and activities of daily living. Otherwise no new problems with vision, hearing, nose, mouth, throat, dermal, cardiovascular, GI, , pulmonary, musculoskeletal, psychiatric or neurological. See Rehab H&P on Rehab chart dated .        Vital signs:  /62   Pulse 80   Temp 98.2 °F (36.8 °C) (Oral)   Resp 17   Ht 5' 7\" (1.702 m)   Wt 193 lb 12.6 oz (87.9 kg)   LMP  (LMP Unknown)   SpO2 97%   BMI 30.35 kg/m²   I/O:   PO/Intake:  fair PO intake, no problems observed or reported. Bowel/Bladder:  incontinent,Purwic constipation and urinary urgency. Last BM 7/9/22. General:  Patient is well developed, adequately nourished, non-obese and     well kempt. HEENT:    PERRLA, hearing intact to loud voice, external inspection of ear     and nose benign. Inspection of lips, tongue and gums  No teeth  Musculoskeletal: No significant change in strength or tone. All joints stable. Inspection and palpation of digits and nails show no clubbing,       cyanosis or inflammatory conditions. Neuro/Psychiatric: Affect: flat. Alert and oriented to person, place and     situation. No significant change in deep tendon reflexes or     Sensation    Lungs:  Diminished, CTA-B. Respiration effort is normal at rest.   fractured right 10-11 th ribs. tunneled Vascath left upper    chest with 2 ports. Heart:   S1 = S2, RRR. No loud murmurs. Abdomen:  Soft, non-tender, no enlargement of liver or spleen. Extremities:  Trace lower extremity edema,    tenderness. dialysis fistula graft to right upper arm that is not in use  Skin:   Intact   - discolored and bruised . ruise to back of right shoulder and right knee. Small sores noted to arms x 3     Rehabilitation:  Physical therapy: FIMS:  Bed Mobility: Scooting: Stand by assistance    Transfers: Sit to Stand: Independent  Stand to sit: Independent  Bed to Chair: Stand by assistance, Ambulation  Surface: level tile  Device: Rollator  Assistance: Independent  Quality of Gait: wide JAKE  Gait Deviations: Slow Tere, Decreased step length, Decreased step height  Distance: 50 ft,      FIMS:  ,  , Assessment: Patiet unable meet HANSON balance score completing at 45/56.  Patient continues to show good safety with gait and transfers and completes seated and supine HEP with [place]    Occupational therapy: FIMS:   ,  , Assessment: Pt is a 58 y/o female who [facility] for medical decline with fall. Pt lives with dtr who assists with ADLs and IADLs PTA. Pt presents with above deficits and impaired ADL status. Pt may benefit from skilled OT intervention for increased indepdnence and safety upon d/c to home    Speech therapy: FIMS:        Lab/X-ray studies reviewed, analyzed and discussed with patient and staff:   Recent Results (from the past 24 hour(s))   POCT Glucose    Collection Time: 07/19/22 12:01 PM   Result Value Ref Range    POC Glucose 176 (H) 70 - 99 mg/dl    Performed on ACCU-CHEK    POCT Glucose    Collection Time: 07/19/22  6:36 PM   Result Value Ref Range    POC Glucose 115 (H) 70 - 99 mg/dl    Performed on ACCU-CHEK    POCT Glucose    Collection Time: 07/19/22  8:08 PM   Result Value Ref Range    POC Glucose 294 (H) 70 - 99 mg/dl    Performed on ACCU-CHEK    Vancomycin Level, Random    Collection Time: 07/20/22  4:54 AM   Result Value Ref Range    Vancomycin Rm 18.6 10.0 - 40.0 ug/mL   POCT Glucose    Collection Time: 07/20/22  5:56 AM   Result Value Ref Range    POC Glucose 160 (H) 70 - 99 mg/dl    Performed on ACCU-CHEK        Previous extensive, complex labs, notes and diagnostics reviewed and analyzed. ALLERGIES:    Allergies as of 07/09/2022 - Fully Reviewed 06/30/2022   Allergen Reaction Noted    Codeine Hives 12/09/2011    Oxycontin [oxycodone hcl] Hives 06/15/2020      (please also verify by checking MAR)        Complex Physical Medicine & Rehab Issues addressed during rehabilitation stay:   Severe abnormality of gait and mobility and impaired self-care and ADL's secondary to progressive weakness dt OA flareup and  Polyneuropathy .   Functional and medical status improved status postacute rehab at [facility]  Bowel progressive constipation, and Bladder dysfunction monitoring neurogenic bladder,   Incontinence of urine:  frequent toileting, ambulate to bathroom with assistance, check post void residuals. Check for C.difficile x1 if >2 loose stools in 24 hours, continue bowel & bladder program.  Monitor bowel and bladder function. Lactinex 2 PO every AC. MOM prn, Brown Bomb prn, Glycerin suppository prn, enema prn. Severe chest wall pain as well as generalized OA pain, fracture T11 chronic pain of both shoulders: reassess pain every shift and prior to and after each therapy session, give prn Tylenol and Ultram, modalities prn in therapy, Lidoderm, K-pad prn. Okay for Ultram as an outpatient. Patient will likely need to get her prescriptions in the future from pain management either myself or other pain management in the area. Skin healing and breakdown risk:  continue pressure relief program.  Daily skin exams and reports from nursing. Add co-Q10  Severe fatigue due to nutritional and hydration deficiency: Add vitamin B12 vitamin D and CoQ10 continue to monitor I&Os, calorie counts prn, dietary consult prn. Sp  healthy HS snack. Acute episodic insomnia with situational adjustment disorder:   monitor for day time sedation. Add HS \"Tuck In\"  Falls risk elevated:  patient to use call light to get nursing assistance to get up, bed and chair alarm. Elevated DVT risk: progressive activities in PT, continue prophylaxis MAHAMED hose, elevation and avoid Lovenox due to renal failure. Complex discharge planning:     SP  medication simplification patient and family education as we transition from to planned pending discharge July 17, 2020 to home with her daughter who is her hired caregiver with follow-up hemodialysis friends  -Tuesday Thursday and Saturdays as well as home health care-eventually transitioning to outpatient therapy. Weekly team meeting  every Monday to re-assess progress towards goals, discuss and address social, psychological and medical comorbidities and to address difficulties they may be having progressing in therapy.   Patient and family education is in progress. The patient is to follow-up with their family physician after discharge. Complex Active General Medical Issues that complicated care  :     1. Principal Problem:    Impaired mobility and activities of daily living dt polyneuropathy and reccent fall   Active Problems:  Chronic CHF, CKD (chronic kidney disease) stage 4, GFR 15-29 ml/min,   ESRD (end stage renal disease) on dialysis, Patient is getting nauseated when she is due for her dialysis due to toxic meds tabulate built up. She is responding to dialysis and Tigan. We also discussed her renal osteodystrophy pain and scheduling her Ultram every 8 hours she is in agreement. Consult nephrology. Titrate Lasix-balance cardiac and renal risk    Closed T11 fracture renal osteodystrophy,   Multiple closed fractures of ribs of right side,   Chest wall contusion, left -Lidoderm, vitamin D, abdominal binder as tolerated    Encephalopathy acute,   Cerebral microvascular disease-limit toxic medications    MRSA bacteremia with Septicemia -IV vancomycin consult pharmacy for dosing monitor renal function and adjust dose. Chronic hepatitis C-eliminate toxic medications    Vitamin B 12 deficiency, Vitamin D insufficiency-Add high-dose vitamin D, recheck vitamin D level out after discharge,  Now with low blood pressure but history of essential (primary) hypertension,   Chronic diastolic congestive heart failure-Acute rehab to monitor heart rate and rhythm with the option of telemetry and the effects of chronotropic medication with respect to increasing physical activity and exercise in PT, OT, ADLs with medication titration to lowest effective dosing. Continue blood signs every shift focusing on heart rate, rhythm and blood pressure checks with orthostatic checks-monitoring the effect of exercise, therapy and posture. Consult hospitalist for backup medical and adjust/add medications (aspirin, Imdur, Lipitor, ProAmatine).   Monitor heart rate and blood pressure as well as medications effects on vital signs before during and after therapy with especial focus on preventing orthostasis and falls risk. Controlled type 2 diabetes mellitus with diabetic neuropathy, with long-term current use of insulin-Continue blood sugar checks every shift, diet, add diabetic add dietary education, restrict carbohydrates to lowest effective and safe carb count per meal advising 4 carbs per meal, add at bedtime snack to prevent a.m. hypoglycemia, adjust/add medications (Lantus, sliding scale insulin)     Pulmonary hypertension -Acute rehab for endurance traing with Pulse Ox to monitoring oxygen saturation and heart rate with O2 titration to lowest effective dose. Pulse oximeter checks to shift and at HS to dose and titrate oxygen and aerosol treatments monitor for nocturnal hypoxemia, monitor vital signs, oxygen prn. Focus on energy conservation. Paranoid schizophrenia -emotional support provided daily, vitamin B12, encourage participation in rehabilitation support group and recreational therapy, adjust/add medications -patient had been on Zoloft at home.             Constantine Zuniga D.O., PM&R     Attending    [address]

## 2022-07-17 NOTE — PROGRESS NOTES
Patient was upx2 to BR to urinate. Up with w/w with SBA- pt steady and mindful of safety and surroundings. Spoke with daughter via phone answering some questions of pt progress. Also expressed concern on mother's vasc cath continuing to clog and when ok to put new fistula- will advise day RN.

## 2022-07-17 NOTE — PROGRESS NOTES
Physical Therapy Rehab Treatment Note  [facility]Department[facility]  [facility]       NAME: Edwina Little  : 1960 (63 y.o.)  MRN: [id number]  CODE STATUS: Full Code    Date of Service: 2022  Chart Reviewed: Yes  Family / Caregiver Present: No    Restrictions:  Restrictions/Precautions: Fall Risk  Position Activity Restriction  Other position/activity restrictions: R Rib fractures - 10 and 11 per patient, dialysis port L chest, No Showering       SUBJECTIVE:   Response To Previous Treatment: Patient with no complaints from previous session. Pain    Pre and post pain /10 lt knee achy pt ok for treatment     OBJECTIVE:     Transfers  Sit to Stand: Independent  Stand to sit: Independent  Comment: rollator used    Ambulation  Surface: level tile  Device: Rollator  Assistance: Independent  Distance: 50 ft           Balance  Comments: static standing with eyes open/ eyes closedsba rt knee bends after some time    Exercises  Hip Flexion: standing hip flexion x10  Comments: single steps a rollator       Activity Tolerance  Activity Tolerance: Patient tolerated treatment well          ASSESSMENT/PROGRESS TOWARDS GOALS:   Body Structures, Functions, Activity Limitations Requiring Skilled Therapeutic Intervention: Decreased functional mobility ; Decreased ADL status; Decreased ROM; Decreased balance;Decreased strength;Decreased posture; Increased pain  Assessment: Pt tends to bend rt knee with prolonged standing vc to strengten. Pt with increased gait speed. Assessment  Activity Tolerance: Patient tolerated treatment well    Goals:  Short Term Goals  Time Frame for Short term goals: 2-4 weeks  Short term goal 1: Indep HEP for symptom mgmt  Short term goal 2: Indep bed mobility  Short term goal 3: Indep transfers bed to chair with assistive device  Short term goal 4: Ambulate with rollator Indep /=50' to allow safe return home.   Long Term Goals  Long term goal 1: Indep bed mobility  Long term goal 2: Indep transfers bed to chair with safest assistive device  Long term goal 3: Ambulate with rollator Indep on level and ramp surfaces >/= 50' to allow safe return home. Long term goal 4: Pt will demonstrate improved score on [place] balance assessment 48/56 for decreased risk for falls. PLAN OF CARE/Safety:   Safety Devices  Type of Devices: All fall risk precautions in place; Bed alarm in place;Call light within reach      Therapy Time:   Individual   Time In 0920   Time Out 0950   Minutes 30     Minutes:30  Gait training:10  Neuro re education:15  Therapeutic ex:5      Heath Rivas PTA, 07/17/22 at 12:24 PM

## 2022-07-17 NOTE — PROGRESS NOTES
Hospitalist Consult/Progress Note  7/17/2022 11:01 AM    Assessment and Plan:   Generalized weakness, Gait instability and Decreased Functional Status secondary to falls: Acute R 10th and 11th rib fractures following fall. MRI brain negative. MRI lumbar spine showed DDD. MRI C spine showed severe stenosis. Seen by Eran Frederick and neurology. Conservative therapy. E. Faecalis BSI: NOELLE negative per cardiology. Repeat blood cultures no growth. Plan is continue IV vancomycin with HD 3x/week per ID. ESRD: On HD. New TDC placed on 7/7. Patient missed hemodialysis yesterday due to clotted catheter. Plan is to resume hemodialysis tomorrow. Management per nephrology. Midodrine on HD days. CAD/HTN: S/p CABG/PCI, s/p TV repair. Continue ASA, imdur and statin  Hx CVA: residual L sided weakness. Continue ASA and statin. IDDM2: POCT ACHS, SSI, basal insulin. Anemia: H/H stable. Schizophrenia: continue abilify  Discharge planning: SW on board. Patient was to discharge today; however, due to missed dialysis secondary to poor access discharge is pending for tomorrow. Medications reconciled for discharge. High Risk Readmission Screening Tool Score Noted.      Additionally, the following hospital problems were addressed:  Principal Problem:    Impaired mobility and activities of daily living dt polyneuropathy and reccent fall   Active Problems:    CKD (chronic kidney disease) stage 4, GFR 15-29 ml/min (HCC)    Closed T11 fracture ([facility])    Encephalopathy acute    MRSA bacteremia    Fall from standing    Septicemia ([facility]    Chronic hepatitis C ([facility] [address] [facility]    Catheter-related bloodstream infection    Enterococcus faecalis infection    Cervical stenosis of spinal canal    Ataxic gait    Lumbar stenosis with neurogenic claudication    Falls infrequently    Vitamin B 12 deficiency    Cerebral microvascular disease    Vitamin D insufficiency    Incontinence of urine    Essential (primary) hypertension    Controlled type 2 diabetes mellitus with diabetic neuropathy, with long-term current use of insulin ([facility] [address]    Uncontrolled type 2 diabetes mellitus with hyperglycemia (HCC)    Chronic diastolic congestive heart failure ([facility] [address]    Pulmonary hypertension, unspecified (HCC)    ESRD (end stage renal disease) on dialysis ([facility] [address]    Chest wall contusion, left, initial encounter    Paranoid schizophrenia ([facility] [address]    Multiple closed fractures of ribs of right side    Chronic pain of both shoulders  Resolved Problems:    * No resolved hospital problems. *      ** Total time spent reviewing medical records, evaluating patient, speaking with RN's and consultants where I was focused exclusively on this patient: minutes. This time is excluding time spent performing procedures or significant events occurring earlier or later in the day requiring my attention and focus. Subjective:   Admit Date: 7/9/2022  PCP: Concepcion Castro MD      Patient seen and examined. No acute events overnight. Afebrile. No new complaints. Pt denies chest pain, SOB, N/V, fevers or chills. Objective:     Vitals:    07/16/22 1630 07/16/22 1636 07/16/22 1850 07/17/22 0621   BP: 133/65 133/65 129/61 127/64   Pulse: 79 79 83 73   Resp:  18 16 16   Temp: 98.6 °F (37 °C) 98 °F (36.7 °C) 98.4 °F (36.9 °C) 98.2 °F (36.8 °C)   TempSrc:   Oral    SpO2:   96% 97%   Weight:  193 lb 12.6 oz (87.9 kg)     Height:         General appearance: No acute distress,  No conversational dyspnea noted. Dentition intact. Answers questions appropriately. Chronically ill appearing. Neurological: Alert, awake, and oriented x3. Motor and sensory grossly intact. No focal deficits. GCS of 15. Lungs: CTAB. no exp wheezes, No rales No retractions; No use of accessory muscles  Heart:  S1, S2 normal, RRR, no MRG appreciated  Abdomen: (+) BS, soft, non-tender; non distended no guarding or rigidity. Extremities:  no cyanosis, trace edema,  no calf tenderness bilaterally.  Dry skin noted Medications:      dextrose        insulin glargine  25 Units SubCUTAneous Nightly    furosemide  40 mg Oral Daily    traMADol  25 mg Oral 3 times per day    acetaminophen  500 mg Oral 3 times per day    Vitamin D  2,000 Units Oral Dinner    cyanocobalamin  1,000 mcg IntraMUSCular Weekly    coenzyme Q10  100 mg Oral Daily    ARIPiprazole  5 mg Oral Daily    aspirin EC  81 mg Oral Daily    atorvastatin  40 mg Oral Nightly    insulin lispro  0-12 Units SubCUTAneous TID WC    insulin lispro  0-6 Units SubCUTAneous Nightly    isosorbide mononitrate  120 mg Oral Daily    lidocaine  3 patch TransDERmal Daily    magnesium oxide  400 mg Oral Daily    melatonin  10 mg Oral Nightly    midodrine  5 mg Oral Once per day on Mon Wed Fri    polyethylene glycol  17 g Oral Daily    sertraline  50 mg Oral Nightly    vancomycin (VANCOCIN) intermittent dosing (placeholder)   Other RX Placeholder       LABS Reviewed    IMAGING Reviewed    LORETO Givens - CNP  [facility] Hospitalist    Additional work up or/and treatment plan may be added today or then after based on clinical progression. I am managing a portion of pt care. Some medical issues are handled by other specialists and Primary Rehabilitation provider. Additional work up and treatment should be done in out pt setting by pt PCP and other out pt providers.

## 2022-07-17 NOTE — PROGRESS NOTES
Subjective: The patient complains of  moderate to severe acute      partially relieved by rest, PT, OT, rest, pain medications and exacerbated by recent illness and exertion. Continues to have pain from her multiple rib fractures and is recovering from bilateral pleural effusions and balancing cardiac with renal risks. Nephrology is consulting and there titrating Lasix with caution. She is getting hemodialysis. I am concerned about patients medical complexities and current active problems and barriers to progress including hx of hep C and paranoid schitzophrenia. He is hoping to trial Bengay and heating pad for her achy muscles. Patient is getting nauseated when she is due for her dialysis due to toxic meds tabulate built up. She is responding to dialysis and Tigan. We also discussed her renal osteodystrophy pain and scheduling her Ultram every 8 hours she is in agreement. She does not feel that she is on the right dose of insulin and we will consult Dr. Leonor Mon. Podiatry was consulted for long toenails and ichthyosis. I have adjusted her vancomycin level checks. She is for hemodialysis on Saturday and discharge home on Sunday. Dr. Luiz Bond and nephrology will work on her Lasix dose. They are balancing her renal and cardiac issues. He is hoping to have home health care at discharge and then transition to outpatient therapy. I discussed current functional, rehabilitation, medical status with other rehabilitation providers including nursing and case management. According to recent nursing note, \"  Patient was upx2 to BR to urinate. Up with w/w with SBA- pt steady and mindful of safety and surroundings. Spoke with daughter via phone answering some questions of pt progress. Also expressed concern on mother's vasc cath continuing to clog and when ok to put new fistula- will advise day RN. \".     She is requesting tramadol as an outpatient for her acute on chronic pain she has acute rib fractures and chronic pain from renal osteodystrophy. ROS x10: The patient also complains of severely impaired mobility and activities of daily living. Otherwise no new problems with vision, hearing, nose, mouth, throat, dermal, cardiovascular, GI, , pulmonary, musculoskeletal, psychiatric or neurological. See Rehab H&P on Rehab chart dated . Vital signs:  /64   Pulse 73   Temp 98.2 °F (36.8 °C)   Resp 16   Ht 5' 7\" (1.702 m)   Wt 193 lb 12.6 oz (87.9 kg)   LMP  (LMP Unknown)   SpO2 97%   BMI 30.35 kg/m²   I/O:   PO/Intake:  fair PO intake, no problems observed or reported. Bowel/Bladder:  incontinent,Purwic constipation and urinary urgency. Last BM 7/9/22. General:  Patient is well developed, adequately nourished, non-obese and     well kempt. HEENT:    PERRLA, hearing intact to loud voice, external inspection of ear     and nose benign. Inspection of lips, tongue and gums  No teeth  Musculoskeletal: No significant change in strength or tone. All joints stable. Inspection and palpation of digits and nails show no clubbing,       cyanosis or inflammatory conditions. Neuro/Psychiatric: Affect: flat. Alert and oriented to person, place and     situation. No significant change in deep tendon reflexes or     Sensation    Lungs:  Diminished, CTA-B. Respiration effort is normal at rest.   fractured right 10-11 th ribs. tunneled Vascath left upper    chest with 2 ports. Heart:   S1 = S2, RRR. No loud murmurs. Abdomen:  Soft, non-tender, no enlargement of liver or spleen. Extremities:  Trace lower extremity edema,    tenderness. dialysis fistula graft to right upper arm that is not in use  Skin:   Intact   - discolored and bruised . ruise to back of right shoulder and right knee. Small sores noted to arms x 3     Rehabilitation:  Physical therapy: FIMS:  Bed Mobility: Scooting: Stand by assistance    Transfers: Sit to Stand: Independent  Stand to sit:  Independent  Bed to Chair: Stand by assistance, Ambulation  Surface: level tile  Device: Rollator  Assistance: Independent  Quality of Gait: wide JAKE  Gait Deviations: Slow Tere, Decreased step length, Decreased step height  Distance: 50 ft,      FIMS:  ,  , Assessment: Pt tends to bend rt knee with prolonged standing vc to strengten. Pt with increased gait speed. Occupational therapy: FIMS:   ,  , Assessment: Pt is a 56 y/o female who presents to [facility] for medical decline with fall. Pt lives with dtr who assists with ADLs and IADLs PTA. Pt presents with above deficits and impaired ADL status.  Pt may benefit from skilled OT intervention for increased indepdnence and safety upon d/c to home    Speech therapy: FIMS:        Lab/X-ray studies reviewed, analyzed and discussed with patient and staff:   Recent Results (from the past 24 hour(s))   POCT Glucose    Collection Time: 07/16/22 11:24 AM   Result Value Ref Range    POC Glucose 213 (H) 70 - 99 mg/dl    Performed on ACCU-CHEK    POCT Glucose    Collection Time: 07/16/22  5:12 PM   Result Value Ref Range    POC Glucose 136 (H) 70 - 99 mg/dl    Performed on ACCU-CHEK    Basic Metabolic Panel    Collection Time: 07/16/22  5:56 PM   Result Value Ref Range    Sodium 133 (L) 135 - 144 mEq/L    Potassium 4.6 3.4 - 4.9 mEq/L    Chloride 94 (L) 95 - 107 mEq/L    CO2 23 20 - 31 mEq/L    Anion Gap 16 (H) 9 - 15 mEq/L    Glucose 166 (H) 70 - 99 mg/dL    BUN 39 (H) 8 - 23 mg/dL    CREATININE 3.75 (H) 0.50 - 0.90 mg/dL    GFR Non-African American 12.1 (L) >60    GFR  14.7 (L) >60    Calcium 9.6 8.5 - 9.9 mg/dL   Hepatitis B Surface Antigen    Collection Time: 07/16/22  5:56 PM   Result Value Ref Range    Hep B S Ag Interp Non-reactive    POCT Glucose    Collection Time: 07/16/22  7:37 PM   Result Value Ref Range    POC Glucose 221 (H) 70 - 99 mg/dl    Performed on ACCU-CHEK    Vancomycin Level, Random    Collection Time: 07/17/22  4:20 AM   Result Value Ref Range    Vancomycin Rm 14.1 10.0 - 40.0 ug/mL   POCT Glucose    Collection Time: 07/17/22  5:54 AM   Result Value Ref Range    POC Glucose 113 (H) 70 - 99 mg/dl    Performed on ACCU-CHEK        Previous extensive, complex labs, notes and diagnostics reviewed and analyzed. ALLERGIES:    Allergies as of 07/09/2022 - Fully Reviewed 06/30/2022   Allergen Reaction Noted    Codeine Hives 12/09/2011    Oxycontin [oxycodone hcl] Hives 06/15/2020      (please also verify by checking MAR)     I have encouraged patient to attend their adjustment to rehabilitation support group with rec therapy and rehabilitation psychology in order to improve their adjustments, well-being, and help their spiritual and cognitive recovery. Complex Physical Medicine & Rehab Issues Assess & Plan:   Severe abnormality of gait and mobility and impaired self-care and ADL's secondary to progressive weakness dt OA flareup and  Polyneuropathy . Functional and medical status reassessed regarding patients ability to participate in therapies and patient found to be able to participate in acute intensive comprehensive inpatient rehabilitation program including PT/OT to improve balance, ambulation, ADLs, and to improve the P/AROM. Therapeutic modifications regarding activities in therapies, place, amount of time per day and intensity of therapy made daily. In bed therapies or bedside therapies prn. Bowel progressive constipation, and Bladder dysfunction monitoring neurogenic bladder,   Incontinence of urine:  frequent toileting, ambulate to bathroom with assistance, check post void residuals. Check for C.difficile x1 if >2 loose stools in 24 hours, continue bowel & bladder program.  Monitor bowel and bladder function. Lactinex 2 PO every AC. MOM prn, Brown Bomb prn, Glycerin suppository prn, enema prn.   Severe chest wall pain as well as generalized OA pain, fracture T11 chronic pain of both shoulders: reassess pain every shift and prior to and after each therapy session, give prn Tylenol and Ultram, modalities prn in therapy, Lidoderm, K-pad prn. Okay for Ultram as an outpatient. Patient will likely need to get her prescriptions in the future from pain management either myself or other pain management in the area. Skin healing and breakdown risk:  continue pressure relief program.  Daily skin exams and reports from nursing. Add co-Q10  Severe fatigue due to nutritional and hydration deficiency: Add vitamin B12 vitamin D and CoQ10 continue to monitor I&Os, calorie counts prn, dietary consult prn. Add healthy HS snack. Acute episodic insomnia with situational adjustment disorder:  consider prn low dose Ambien, monitor for day time sedation. Add HS \"Tuck In\"  Falls risk elevated:  patient to use call light to get nursing assistance to get up, bed and chair alarm. Elevated DVT risk: progressive activities in PT, continue prophylaxis MAHAMED hose, elevation and avoid Lovenox due to renal failure. Complex discharge planning:     Complete the medication simplification patient and family education as we transition from to planned pending discharge July 18, 2020 to home with her daughter who is her hired caregiver with follow-up hemodialysis friends  -Tuesday Thursday and Saturdays as well as home health care-eventually transitioning to outpatient therapy. Weekly team meeting  every Monday to re-assess progress towards goals, discuss and address social, psychological and medical comorbidities and to address difficulties they may be having progressing in therapy. Patient and family education is in progress. The patient is to follow-up with their family physician after discharge. Complex Active General Medical Issues that complicate care Assess & Plan:     1.  Principal Problem:    Impaired mobility and activities of daily living dt polyneuropathy and reccent fall   Active Problems:  Chronic CHF, CKD (chronic kidney disease) stage 4, GFR 15-29 ml/min,   ESRD (end stage renal disease) on dialysis, Patient is getting nauseated when she is due for her dialysis due to toxic meds tabulate built up. She is responding to dialysis and Tigan. We also discussed her renal osteodystrophy pain and scheduling her Ultram every 8 hours she is in agreement. Consult nephrology. Titrate Lasix-balance cardiac and renal risk    Closed T11 fracture renal osteodystrophy,   Multiple closed fractures of ribs of right side,   Chest wall contusion, left -Lidoderm, vitamin D, abdominal binder as tolerated    Encephalopathy acute,   Cerebral microvascular disease-limit toxic medications    MRSA bacteremia with Septicemia -IV vancomycin consult pharmacy for dosing monitor renal function and adjust dose. Chronic hepatitis C-eliminate toxic medications    Vitamin B 12 deficiency, Vitamin D insufficiency-Add high-dose vitamin D, recheck vitamin D level out after discharge,  Now with low blood pressure but history of essential (primary) hypertension,   Chronic diastolic congestive heart failure-Acute rehab to monitor heart rate and rhythm with the option of telemetry and the effects of chronotropic medication with respect to increasing physical activity and exercise in PT, OT, ADLs with medication titration to lowest effective dosing. Continue blood signs every shift focusing on heart rate, rhythm and blood pressure checks with orthostatic checks-monitoring the effect of exercise, therapy and posture. Consult hospitalist for backup medical and adjust/add medications (aspirin, Imdur, Lipitor, ProAmatine). Monitor heart rate and blood pressure as well as medications effects on vital signs before during and after therapy with especial focus on preventing orthostasis and falls risk.     Controlled type 2 diabetes mellitus with diabetic neuropathy, with long-term current use of insulin-Continue blood sugar checks every shift, diet, add diabetic add dietary education, restrict carbohydrates to lowest effective and safe carb count per meal advising 4 carbs per meal, add at bedtime snack to prevent a.m. hypoglycemia, adjust/add medications (Lantus, sliding scale insulin)     Pulmonary hypertension -Acute rehab for endurance traing with Pulse Ox to monitoring oxygen saturation and heart rate with O2 titration to lowest effective dose. Pulse oximeter checks to shift and at HS to dose and titrate oxygen and aerosol treatments monitor for nocturnal hypoxemia, monitor vital signs, oxygen prn. Focus on energy conservation. Paranoid schizophrenia -emotional support provided daily, vitamin B12, encourage participation in rehabilitation support group and recreational therapy, adjust/add medications -patient had been on Zoloft at home. Focus of today's plan-  Initiate and modify therapuetic plan to meet patients individual needs, add rest breaks as needed  -consider resuming SSRI. Patient is preparing for her discharge on the 18th she will have dialysis on the 17 th. He is to work on balance endurance stairs and car transfers as well as family training.       Shayan Mccullough D.O., PM&R     Attending    [facility]

## 2022-07-17 NOTE — PROGRESS NOTES
the last 72 hours. LIVER PROFILE:No results for input(s): AST, ALT, BILIDIR, BILITOT, ALKPHOS in the last 72 hours. Imaging Last 24 Hours:  No results found.   Assessment//Plan           Hospital Problems             Last Modified POA    * (Principal) Impaired mobility and activities of daily living dt polyneuropathy and reccent fall  7/10/2022 Yes    CKD (chronic kidney disease) stage 4, GFR 15-29 ml/min (HCC) 7/10/2022 Yes    Closed T11 fracture ([address] 7/10/2022 Yes    Encephalopathy acute 7/10/2022 Yes    MRSA bacteremia 7/10/2022 Yes    Fall from standing 7/10/2022 Yes    Septicemia ([address] 7/10/2022 Yes    Chronic hepatitis C ([address] 7/10/2022 Yes    Catheter-related bloodstream infection 7/10/2022 Yes    Enterococcus faecalis infection 7/10/2022 Yes    Cervical stenosis of spinal canal 7/11/2022 Yes    Ataxic gait 7/11/2022 Yes    Lumbar stenosis with neurogenic claudication 7/15/2022 Yes    Falls infrequently 7/15/2022 Yes    Vitamin B 12 deficiency 7/10/2022 Yes    Cerebral microvascular disease 7/10/2022 Yes    Vitamin D insufficiency 7/10/2022 Yes    Incontinence of urine 7/10/2022 Yes    Essential (primary) hypertension 7/10/2022 Yes    Controlled type 2 diabetes mellitus with diabetic neuropathy, with long-term current use of insulin ([address] 7/10/2022 Yes    Overview Signed 2/24/2017  2:31 PM by Erick Moser MD     stable off pioglitazone  to focus on a better diet         Uncontrolled type 2 diabetes mellitus with hyperglycemia ([address] 7/13/2022 Yes    Chronic diastolic congestive heart failure ([address] (Chronic) 7/10/2022 Yes    Pulmonary hypertension, unspecified ([address] 7/10/2022 Yes    ESRD (end stage renal disease) on dialysis ([address] 7/10/2022 Yes    Chest wall contusion, left, initial encounter 7/10/2022 Yes    Paranoid schizophrenia ([address] 7/10/2022 Yes    Multiple closed fractures of ribs of right side 7/10/2022 Yes    Chronic pain of both shoulders 7/10/2022 Yes     Assessment:    Condition: In stable condition. Unchanged. (Uncontrolled type 2 diabetes blood sugars between 1-200 range  End-stage renal disease on dialysis  Pulmonary hypertension  Encephalopathy improving). Plan:   Discharge home. (Continue Lantus 25 units at bedtime plus Humalog sliding scale coverage  Discharge on 17 July  On Lantus 50 5 at night plus NovoLog 8 to 10 units with each meals  Reviewed other consultants note total time spent was 15 minutes).      Electronically signed by Cyndie Santana MD on 7/16/22 at 8:54 PM EDT

## 2022-07-17 NOTE — PLAN OF CARE
Problem: Discharge Planning  Goal: Discharge to home or other facility with appropriate resources  7/16/2022 2203 by Hannah David RN  Outcome: Progressing  Flowsheets (Taken 7/16/2022 2010)  Discharge to home or other facility with appropriate resources: Identify barriers to discharge with patient and caregiver  7/16/2022 1249 by Karen Davis RN  Outcome: Progressing     Problem: Safety - Adult  Goal: Free from fall injury  7/16/2022 2203 by Hannah David RN  Outcome: Progressing  7/16/2022 1249 by Karen Davis RN  Outcome: Progressing     Problem: Skin/Tissue Integrity  Goal: Absence of new skin breakdown  Description: 1. Monitor for areas of redness and/or skin breakdown  2. Assess vascular access sites hourly  3. Every 4-6 hours minimum:  Change oxygen saturation probe site  4. Every 4-6 hours:  If on nasal continuous positive airway pressure, respiratory therapy assess nares and determine need for appliance change or resting period.   7/16/2022 2203 by Hannah David RN  Outcome: Progressing  7/16/2022 1249 by Karen Davis RN  Outcome: Progressing     Problem: ABCDS Injury Assessment  Goal: Absence of physical injury  7/16/2022 2203 by Hannah David RN  Outcome: Progressing  7/16/2022 1249 by Karen Davis RN  Outcome: Progressing     Problem: Nutrition Deficit:  Goal: Optimize nutritional status  7/16/2022 2203 by Hannah David RN  Outcome: Progressing  7/16/2022 1249 by Karen Davis RN  Outcome: Progressing     Problem: Chronic Conditions and Co-morbidities  Goal: Patient's chronic conditions and co-morbidity symptoms are monitored and maintained or improved  7/16/2022 2203 by Hannah David RN  Outcome: Progressing  Flowsheets (Taken 7/16/2022 2010)  Care Plan - Patient's Chronic Conditions and Co-Morbidity Symptoms are Monitored and Maintained or Improved: Monitor and assess patient's chronic conditions and comorbid symptoms for stability, deterioration, or improvement  7/16/2022 1249 by Hermilo Ramirez, RN  Outcome: Progressing

## 2022-07-18 ENCOUNTER — HOSPITAL ENCOUNTER (INPATIENT)
Dept: INTERVENTIONAL RADIOLOGY/VASCULAR | Age: 62
Discharge: HOME | DRG: 949 | End: 2022-07-20
Attending: PHYSICAL MEDICINE & REHABILITATION
Payer: MEDICARE

## 2022-07-18 VITALS
RESPIRATION RATE: 15 BRPM | DIASTOLIC BLOOD PRESSURE: 67 MMHG | SYSTOLIC BLOOD PRESSURE: 141 MMHG | OXYGEN SATURATION: 97 % | HEART RATE: 71 BPM

## 2022-07-18 LAB
GLUCOSE BLD-MCNC: 103 MG/DL (ref 70–99)
GLUCOSE BLD-MCNC: 123 MG/DL (ref 70–99)
GLUCOSE BLD-MCNC: 143 MG/DL (ref 70–99)
PERFORMED ON: ABNORMAL
VANCOMYCIN RANDOM: 29.6 UG/ML (ref 10–40)

## 2022-07-18 PROCEDURE — 92526 ORAL FUNCTION THERAPY: CPT

## 2022-07-18 PROCEDURE — 97535 SELF CARE MNGMENT TRAINING: CPT

## 2022-07-18 PROCEDURE — 97530 THERAPEUTIC ACTIVITIES: CPT

## 2022-07-18 PROCEDURE — 6370000000 HC RX 637 (ALT 250 FOR IP): Performed by: INTERNAL MEDICINE

## 2022-07-18 PROCEDURE — 77001 FLUOROGUIDE FOR VEIN DEVICE: CPT | Performed by: RADIOLOGY

## 2022-07-18 PROCEDURE — B5191ZZ FLUOROSCOPY OF INFERIOR VENA CAVA USING LOW OSMOLAR CONTRAST: ICD-10-PCS | Performed by: RADIOLOGY

## 2022-07-18 PROCEDURE — 36581 REPLACE TUNNELED CV CATH: CPT | Performed by: RADIOLOGY

## 2022-07-18 PROCEDURE — 6360000004 HC RX CONTRAST MEDICATION: Performed by: RADIOLOGY

## 2022-07-18 PROCEDURE — 80202 ASSAY OF VANCOMYCIN: CPT

## 2022-07-18 PROCEDURE — 2580000003 HC RX 258: Performed by: RADIOLOGY

## 2022-07-18 PROCEDURE — 02HV33Z INSERTION OF INFUSION DEVICE INTO SUPERIOR VENA CAVA, PERCUTANEOUS APPROACH: ICD-10-PCS | Performed by: RADIOLOGY

## 2022-07-18 PROCEDURE — 99232 SBSQ HOSP IP/OBS MODERATE 35: CPT | Performed by: PHYSICIAN ASSISTANT

## 2022-07-18 PROCEDURE — 97110 THERAPEUTIC EXERCISES: CPT

## 2022-07-18 PROCEDURE — 2709999900 IR REPLACE TUNNELED CVC WO SQ PORT/PUMP SAME ACCESS

## 2022-07-18 PROCEDURE — 6370000000 HC RX 637 (ALT 250 FOR IP): Performed by: PHYSICAL MEDICINE & REHABILITATION

## 2022-07-18 PROCEDURE — 99233 SBSQ HOSP IP/OBS HIGH 50: CPT | Performed by: PHYSICAL MEDICINE & REHABILITATION

## 2022-07-18 PROCEDURE — 36415 COLL VENOUS BLD VENIPUNCTURE: CPT

## 2022-07-18 PROCEDURE — 1180000000 HC REHAB R&B

## 2022-07-18 PROCEDURE — 0J2TXYZ CHANGE OTHER DEVICE IN TRUNK SUBCUTANEOUS TISSUE AND FASCIA, EXTERNAL APPROACH: ICD-10-PCS | Performed by: RADIOLOGY

## 2022-07-18 PROCEDURE — 99231 SBSQ HOSP IP/OBS SF/LOW 25: CPT | Performed by: NURSE PRACTITIONER

## 2022-07-18 PROCEDURE — A4217 STERILE WATER/SALINE, 500 ML: HCPCS | Performed by: RADIOLOGY

## 2022-07-18 PROCEDURE — 6360000002 HC RX W HCPCS: Performed by: RADIOLOGY

## 2022-07-18 PROCEDURE — 8010000000 HC HEMODIALYSIS ACUTE INPT

## 2022-07-18 PROCEDURE — 77001 FLUOROGUIDE FOR VEIN DEVICE: CPT

## 2022-07-18 PROCEDURE — 2500000003 HC RX 250 WO HCPCS: Performed by: RADIOLOGY

## 2022-07-18 PROCEDURE — 99223 1ST HOSP IP/OBS HIGH 75: CPT | Performed by: RADIOLOGY

## 2022-07-18 PROCEDURE — 36581 REPLACE TUNNELED CV CATH: CPT

## 2022-07-18 RX ORDER — MAGNESIUM HYDROXIDE 1200 MG/15ML
LIQUID ORAL CONTINUOUS PRN
Status: COMPLETED | OUTPATIENT
Start: 2022-07-18 | End: 2022-07-18

## 2022-07-18 RX ORDER — HEPARIN SODIUM 1000 [USP'U]/ML
1000 INJECTION, SOLUTION INTRAVENOUS; SUBCUTANEOUS ONCE
Status: CANCELLED | OUTPATIENT
Start: 2022-07-18 | End: 2022-07-18

## 2022-07-18 RX ORDER — ARIPIPRAZOLE 5 MG/1
TABLET ORAL
Qty: 30 TABLET | Refills: 10 | OUTPATIENT
Start: 2022-07-18

## 2022-07-18 RX ORDER — LIDOCAINE HYDROCHLORIDE 20 MG/ML
INJECTION, SOLUTION INFILTRATION; PERINEURAL
Status: COMPLETED | OUTPATIENT
Start: 2022-07-18 | End: 2022-07-18

## 2022-07-18 RX ORDER — FUROSEMIDE 20 MG/1
TABLET ORAL
Qty: 60 TABLET | Refills: 10 | OUTPATIENT
Start: 2022-07-18

## 2022-07-18 RX ORDER — LIDOCAINE HYDROCHLORIDE 20 MG/ML
10 INJECTION, SOLUTION INFILTRATION; PERINEURAL
Status: CANCELLED | OUTPATIENT
Start: 2022-07-18

## 2022-07-18 RX ORDER — 0.9 % SODIUM CHLORIDE 0.9 %
500 INTRAVENOUS SOLUTION INTRAVENOUS
Status: CANCELLED | OUTPATIENT
Start: 2022-07-18

## 2022-07-18 RX ORDER — HEPARIN SODIUM 1000 [USP'U]/ML
INJECTION, SOLUTION INTRAVENOUS; SUBCUTANEOUS
Status: COMPLETED | OUTPATIENT
Start: 2022-07-18 | End: 2022-07-18

## 2022-07-18 RX ORDER — SODIUM CHLORIDE 9 MG/ML
INJECTION, SOLUTION INTRAVENOUS
Status: DISPENSED
Start: 2022-07-18 | End: 2022-07-19

## 2022-07-18 RX ORDER — 0.9 % SODIUM CHLORIDE 0.9 %
250 INTRAVENOUS SOLUTION INTRAVENOUS
Status: CANCELLED | OUTPATIENT
Start: 2022-07-18

## 2022-07-18 RX ADMIN — ATORVASTATIN CALCIUM 40 MG: 40 TABLET, FILM COATED ORAL at 21:23

## 2022-07-18 RX ADMIN — HEPARIN SODIUM 5000 UNITS: 1000 INJECTION INTRAVENOUS; SUBCUTANEOUS at 15:54

## 2022-07-18 RX ADMIN — INSULIN GLARGINE 25 UNITS: 100 INJECTION, SOLUTION SUBCUTANEOUS at 21:23

## 2022-07-18 RX ADMIN — ACETAMINOPHEN 500 MG: 500 TABLET ORAL at 05:42

## 2022-07-18 RX ADMIN — TRAMADOL HYDROCHLORIDE 25 MG: 50 TABLET, COATED ORAL at 13:27

## 2022-07-18 RX ADMIN — FUROSEMIDE 40 MG: 40 TABLET ORAL at 08:34

## 2022-07-18 RX ADMIN — ARIPIPRAZOLE 5 MG: 5 TABLET ORAL at 08:34

## 2022-07-18 RX ADMIN — MIDODRINE HYDROCHLORIDE 5 MG: 5 TABLET ORAL at 08:34

## 2022-07-18 RX ADMIN — Medication 400 MG: at 08:34

## 2022-07-18 RX ADMIN — INSULIN LISPRO 2 UNITS: 100 INJECTION, SOLUTION INTRAVENOUS; SUBCUTANEOUS at 11:55

## 2022-07-18 RX ADMIN — TRAMADOL HYDROCHLORIDE 25 MG: 50 TABLET, COATED ORAL at 21:23

## 2022-07-18 RX ADMIN — ISOSORBIDE MONONITRATE 120 MG: 60 TABLET, EXTENDED RELEASE ORAL at 08:34

## 2022-07-18 RX ADMIN — TRAMADOL HYDROCHLORIDE 25 MG: 50 TABLET, COATED ORAL at 05:42

## 2022-07-18 RX ADMIN — IOPAMIDOL 20 ML: 612 INJECTION, SOLUTION INTRAVENOUS at 15:43

## 2022-07-18 RX ADMIN — ACETAMINOPHEN 500 MG: 500 TABLET ORAL at 13:27

## 2022-07-18 RX ADMIN — SERTRALINE 50 MG: 25 TABLET, FILM COATED ORAL at 21:24

## 2022-07-18 RX ADMIN — LIDOCAINE HYDROCHLORIDE 20 ML: 20 INJECTION, SOLUTION INFILTRATION; PERINEURAL at 15:47

## 2022-07-18 RX ADMIN — SODIUM CHLORIDE 500 ML: 900 IRRIGANT IRRIGATION at 15:41

## 2022-07-18 RX ADMIN — ASPIRIN 81 MG: 81 TABLET, COATED ORAL at 08:34

## 2022-07-18 RX ADMIN — Medication 10 MG: at 21:23

## 2022-07-18 RX ADMIN — Medication 100 MG: at 08:34

## 2022-07-18 RX ADMIN — POLYETHYLENE GLYCOL 3350 17 G: 17 POWDER, FOR SOLUTION ORAL at 21:23

## 2022-07-18 ASSESSMENT — ENCOUNTER SYMPTOMS
SHORTNESS OF BREATH: 0
ABDOMINAL DISTENTION: 0
RESPIRATORY NEGATIVE: 1
WHEEZING: 0
CONSTIPATION: 0
ABDOMINAL PAIN: 0
NAUSEA: 0
VOMITING: 0
CHEST TIGHTNESS: 0
GASTROINTESTINAL NEGATIVE: 1
EYES NEGATIVE: 1
BACK PAIN: 1
TROUBLE SWALLOWING: 0
COUGH: 0
PHOTOPHOBIA: 0
DIARRHEA: 0
COLOR CHANGE: 0

## 2022-07-18 ASSESSMENT — PAIN SCALES - GENERAL
PAINLEVEL_OUTOF10: 0
PAINLEVEL_OUTOF10: 3

## 2022-07-18 ASSESSMENT — PAIN DESCRIPTION - DESCRIPTORS: DESCRIPTORS: ACHING

## 2022-07-18 ASSESSMENT — PAIN DESCRIPTION - LOCATION: LOCATION: BACK;KNEE

## 2022-07-18 NOTE — CONSULTS
CC: No chief complaint on file. HPI:  Patient is a pleasant 59-year-old woman with end-stage kidney disease, currently on dialysis through a left chest tunneled internal jugular vein permanent dialysis catheter. Patient has a right arm fistula which has stopped working and completely clotted unable to open in the past. She has been admitted with rib fractures. Recently her left-sided dialysis catheter has not been able to aspirate very well. Interventional radiology was consulted for evaluation and possible change repair of the catheter.      Family History   Problem Relation Age of Onset    Cancer Mother 76        survived    Breast Cancer Mother     Hypertension Father     Diabetes Sister     Mental Illness Sister     Colon Cancer Neg Hx        Past Surgical History:   Procedure Laterality Date     SECTION      x1    COLONOSCOPY  2014    Dr. Jagjit Gil      x1 Dr. Morgan Miller, Dr Karen Echevarria  08/10/2021    [facility]    DIAGNOSTIC CARDIAC CATH LAB PROCEDURE  10/02/2019    DIALYSIS CATHETER INSERTION Left 2022    Tunneled Symetrex 15.5F x 23cm hemodialysis catheter inserted by Dr. Palomo Vinson, TOTAL ABDOMINAL (CERVIX REMOVED)      one ovary intact, Dr Eladio Martinez, menorrhagia    IR THROMBECTOMY PERCUT AVF  2022    IR THROMBECTOMY PERCUT AVF 2022 [facility] PROCEDURE    IR TUNNELED CATHETER PLACEMENT GREATER THAN 5 YEARS  2022    IR TUNNELED CATHETER PLACEMENT GREATER THAN 5 YEARS 6/3/2022 [facility] PROCEDURE    IR TUNNELED CATHETER PLACEMENT GREATER THAN 5 YEARS Left 2022    15.5 fr by 23 cm Symetrex dialysis catheter inserted by Dr Emma Linn    IR TUNNELED [address] 5 YEARS  2022    IR TUNNELED CATHETER PLACEMENT GREATER THAN 5 YEARS 2022 [facility] PROCEDURE    ID TOTAL KNEE ARTHROPLASTY Left 2018    LEFT KNEE TOTAL KNEE ARTHROPLASTY, SHAYNA, NERVE BLOCK [facility] [address] [facility]     TOTAL KNEE ARTHROPLASTY  05/19/2016    Dr Barber Wright    TUNNELED [address] Right 07/01/2020    tunneled HD catheter per Dr Mynor Segura        Past Medical History:   Diagnosis Date    Angina at rest [facility]) 5/24/2020    Anxiety     CAD S/P percutaneous coronary angioplasty 2015, 2018    stents per dr Marco Antonio Fofana    CHF (congestive heart failure) ([facility]     CKD (chronic kidney disease) stage 4, GFR 15-29 ml/min ([address] 2/24/2018    CKD stage 4 due to type 2 diabetes mellitus ([address]     Contusion of right chest wall 2/16/2021    COPD (chronic obstructive pulmonary disease) ([facility]     Diabetic nephropathy with proteinuria ([facility] 2014    DJD (degenerative joint disease) of knee     Dr Barber Wright    GERD (gastroesophageal reflux disease)     Hemiparesis, left ([facility] 2013    [facility]    Hemodialysis patient [facility])     Hemodialysis-associated hypotension 10/22/2021    History of heart failure     History of seizures     History of type C viral hepatitis     HTN (hypertension)     Hyperlipidemia     Impaired mobility and activities of daily living     Mediastinal lymphadenopathy 2013    Dr Gates Burkitt, Brian Leyva    Metabolic syndrome     Moderate persistent asthma without complication 9/56/5589    Need for extended care facility 7/7/2021    Neurogenic urinary incontinence 2013    Neuropathy in diabetes ([address]     Nonrheumatic mitral valve regurgitation 7/7/2021    Nonrheumatic tricuspid valve regurgitation 7/7/2021    Obesity (BMI 30-39. 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 ([facility]     Type 2 diabetes mellitus with renal manifestations, controlled ([address] 2015    Insulin dependent, Dr Lu Jordan    Uncontrolled type 2 diabetes mellitus with hyperglycemia ([facility]     Urinary incontinence due to cognitive impairment 2013    Vitamin D deficiency 2014       Social History     Socioeconomic History    Marital status:      Spouse name: None    Number of children: 2    Years of education: None    Highest education level: None   Occupational History    Occupation: disabled   Tobacco Use    Smoking status: Never    Smokeless tobacco: Never   Vaping Use    Vaping Use: Never used   Substance and Sexual Activity    Alcohol use: No     Alcohol/week: 0.0 standard drinks    Drug use: No    Sexual activity: Not Currently   Social History Narrative    Disabled dt depression and low back pain, lives in [facility] [place] Qureshi is close by and is her paid caregiver via waiver services    HD via [employer], [id number], PETÄJÄVESI, Sat. Son is in the home and has CP and is total care-and is also cared for by a waiver by Elaine. 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 Per daughter, her brother is blind, MRDD, CP multiple health issues. Daughter's  is patient's legal guardian. Patient has hemodialysis Tuesday Thursday and Saturday.   Patient's bedroom is on main floor with a half bath. Daughter walks patient upstairs once weekly for full bath. Patient is using her walker in the home. Patient has a hospital bed in the home. Social Determinants of Health     Physical Activity: Sufficiently Active    Days of Exercise per Week: 3 days    Minutes of Exercise per Session: 60 min       Allergies   Allergen Reactions    Codeine Hives     hives    Oxycontin [Oxycodone Hcl] Hives       No current facility-administered medications on file prior to encounter. Current Outpatient Medications on File Prior to Encounter   Medication Sig Dispense Refill    insulin aspart (NOVOLOG FLEXPEN) 100 UNIT/ML injection pen INJECT 8 TO 10 UNITS WITH EACH MEAL. 30 mL 3    ARIPiprazole (ABILIFY) 5 MG tablet TAKE 1 TABLET BY MOUTH ONCE DAILY 30 tablet 0    nitroGLYCERIN (NITROSTAT) 0.4 MG SL tablet up to max of 3 total doses. If no relief after 1 dose, call 911. 25 tablet 3    furosemide (LASIX) 20 MG tablet Take 1 tablet by mouth 2 times daily (Patient taking differently: Take 100 mg by mouth 2 times daily ) 60 tablet 0    NYAMYC 533633 UNIT/GM powder APPLY TO AFFECTED AREA OF ABDOMINAL FOLDS EVERY 12 HOURS AS NEEDED 60 g 5    isosorbide mononitrate (IMDUR) 30 MG extended release tablet Take 4 tablets by mouth daily 30 tablet 3    magnesium oxide (MAG-OX) 400 MG tablet TAKE 1 TABLET BY MOUTH DAILY 30 tablet 12    melatonin 10 MG CAPS capsule Take 1 capsule by mouth nightly 30 capsule 12    midodrine (PROAMATINE) 5 MG tablet Take 1 tablet by mouth one time a day every Tue, Thu, Sat for hypotension. Give 30 mins prior to dialysis. 90 tablet 3    Handicap Placard MISC by Does not apply route Expiration in 5 years. 1 each 0    lidocaine-prilocaine (EMLA) 2.5-2.5 % cream Apply topically as needed for Pain Apply topically as needed.  3 times a week before dialysis wrap with saran wrap      albuterol (PROVENTIL) (2.5 MG/3ML) 0.083% nebulizer solution Take 3 mLs by nebulization every 4 hours as needed for Wheezing 120 each 3    blood glucose test strips (ONETOUCH VERIO) strip  each 3    OneTouch Delica Lancets 49Y MISC  each 3    Blood Glucose Monitoring Suppl (ONETOUCH VERIO) w/Device KIT AS DIRECTED 1 kit 0    Insulin Pen Needle (SURE COMFORT PEN NEEDLES) 30G X 8 MM MISC USE AS DIRECTED FIVE TIMES A DAILY 100 each 10    b complex-C-folic acid (NEPHROCAPS) 1 MG capsule Take 1 capsule by mouth daily      LANTUS SOLOSTAR 100 UNIT/ML injection pen 55 units at bedtime 10 pen 10    [DISCONTINUED] metoprolol tartrate (LOPRESSOR) 25 MG tablet TAKE 1/2 TABLET BY MOUTH TWO TIMES DAILY  (Patient taking differently: Take 12.5 mg by mouth 2 times daily ) 90 tablet 4    aspirin EC 81 MG EC tablet Take 1 tablet by mouth daily 30 tablet 12    blood glucose test strips (FREESTYLE LITE) strip 1 each by Does not apply route 4 times daily (before meals and nightly) As needed. 200 strip 5    Alcohol Swabs (EASY TOUCH ALCOHOL PREP MEDIUM) 70 % PADS USE AS DIRECTED THREE TIMES A  each 3    FreeStyle Lancets MISC Test 4x daily 150 each 3    acetaminophen (TYLENOL) 325 MG tablet Take 2 tablets by mouth every 4 hours as needed for Pain (For mild to moderate pain (Pain 1-6 out of 10 on pain scale)) 120 tablet 0    Blood Glucose Monitoring Suppl (FREESTYLE LITE) CORETTA 1 Device by Does not apply route daily as needed (Diabetes) Use freestyle meter to test blood sugar as needed 1 Device 0       Review of Systems   Constitutional: Negative. Negative for chills, diaphoresis and fever. HENT: Negative. Negative for congestion, ear pain, hearing loss and tinnitus. Eyes: Negative. Negative for photophobia. Respiratory: Negative. Negative for cough, shortness of breath and wheezing. Cardiovascular: Negative. Negative for chest pain, palpitations and leg swelling. Gastrointestinal: Negative. Negative for abdominal pain, constipation, diarrhea, nausea and vomiting. Genitourinary: Negative.   Negative for dysuria, flank pain, frequency, hematuria and urgency. Musculoskeletal:  Positive for back pain. Negative for neck pain. Skin: Negative. Negative for rash. Allergic/Immunologic: Negative for environmental allergies. Neurological: Negative. Negative for dizziness, tremors, weakness and headaches. Hematological:  Does not bruise/bleed easily. Psychiatric/Behavioral: Negative. Negative for hallucinations and suicidal ideas. The patient is not nervous/anxious. OBJECTIVE:  /60   Pulse 75   Temp 98.4 °F (36.9 °C)   Resp 16   Ht 5' 7\" (1.702 m)   Wt 193 lb 12.6 oz (87.9 kg)   LMP  (LMP Unknown)   SpO2 98%   BMI 30.35 kg/m²     Physical Exam  Constitutional:       General: She is not in acute distress. Appearance: She is well-developed. She is not diaphoretic. HENT:      Head: Normocephalic and atraumatic. Nose: Nose normal.      Mouth/Throat:      Pharynx: No oropharyngeal exudate. Eyes:      General: No scleral icterus. Right eye: No discharge. Left eye: No discharge. Conjunctiva/sclera: Conjunctivae normal.   Neck:      Thyroid: No thyromegaly. Vascular: No JVD. Trachea: No tracheal deviation. Cardiovascular:      Rate and Rhythm: Normal rate and regular rhythm. Heart sounds: Normal heart sounds. No murmur heard. No friction rub. No gallop. Pulmonary:      Effort: No respiratory distress. Breath sounds: No stridor. No wheezing or rales. Chest:      Chest wall: No tenderness. Comments: Tunneled HD CVC is unremarkable. Abdominal:      General: Bowel sounds are normal. There is no distension. Palpations: Abdomen is soft. There is no mass. Tenderness: There is no abdominal tenderness. There is no guarding or rebound. Hernia: No hernia is present. Musculoskeletal:         General: No tenderness or deformity. Cervical back: Neck supple. Skin:     General: Skin is dry.       Coloration: Skin is not pale.      Findings: No erythema or rash. Neurological:      Mental Status: She is alert and oriented to person, place, and time. Cranial Nerves: No cranial nerve deficit. Psychiatric:         Behavior: Behavior normal.         Thought Content: Thought content normal.         Judgment: Judgment normal.       Lab Results   Component Value Date/Time    WBC 8.4 07/14/2022 05:00 AM    HGB 9.2 07/14/2022 05:00 AM    HCT 26.9 07/14/2022 05:00 AM     07/14/2022 05:00 AM    MCV 91.3 07/14/2022 05:00 AM       No results found. ASSESSMENT ANDPLAN:    ASSESSMENT: Patient with end-stage kidney disease, multiple other medical problems, admitted with rib fractures. Left internal jugular vein tunneled dialysis catheter being used for dialysis, will stop working and now unable to have dialysis. Patient has a right arm fistula and is completely thrombosed, status post attempted thrombectomy, though unremarkable. PLAN: Venogram through the left chest tunneled dialysis catheter and possible exchange.     Ra Hermosillo MD, Claudia Schwartz

## 2022-07-18 NOTE — PROGRESS NOTES
INDIVIDUALIZED OVERALL REHAB PLAN OF CARE  ADDENDUM TO REHAB PROGRESS NOTE-for audit purposes must also refer to this day's clinical note and combine the information      Date: 2022  Patient Name: Florence Cary   Room: Q882/G817-18    MRN: [id number]    : 1960  (57 y.o.)  Gender: female       Today 2022 during weekly team meeting, I reviewed the patient Florence Cary in detail with the therapists and nurses involved in patient's care gathering complex physiatric data regarding current medical issues, progress in therapies, factors limiting progress, social issues, psychological issues, ongoing therapeutic plans and discharge planning. Legend:  I= independent Im =Modified independent  S=Supervised SB=stand by PALACIO=set up CG=contact annabelle Min= minimal Mod=Moderate Max=maximal Max of 2 =maximal assist of 2 people      CURRENT FUNCTIONAL STATUS:    Barriers to progress and discharge complex medical conditions, severe pain, complex social situations and bowel/bladder dysfunction      NURSING ISSUES:    Pending new dialysis catheter. Still on IV Vanco with HD. Patient as been up to BR x 3 this shift as of now. Up with W/W- SBA- steady with walker, mindful of surrondings. Satisfied with control of pain. Pt states she will be going home Tuesday per \"DRMinh \". pt to have HD Monday  ??if AM or afternoon- also was told by pt Dr. Traci Edge is to see her before D/C. Call light in reach of pt as well as bedside table in reach. Nursing will continue to focus on bowel and bladder continence transitioning toward independence by time of discharge. Monitoring post void residuals monitoring for severe constipation and bowel obstruction.     Bowel function- continent/normal  Plans to address- teach spinal cord style bowel program     Bladder function-  incontinent    Plans to address- schedule voids before bed and therapy     Skin deficits-  icthyosis   Plans to address- topicals and dressing changes Hydration/Nutritional deficits- monitoring for dysphagia  Plans to address-Push PO, assist with feeds as needed     Low BP- continue to monitor Ortho BPs  Plans to address- strict I's and O's    Pt and Family training goals-  teach home technology options like Agatha use and home assistive devices      Focus on achieving ADL goals with co-treating with OT when possible. Focus on cognition and co treat with SLP when possible. PHYSICAL THERAPY  Bed mobility:  Bed mobility  Rolling to Left: Modified independent (07/10/22 1146)  Rolling to Right: Modified independent (07/10/22 1146)  Supine to Sit: Stand by assistance (07/10/22 1139)  Sit to Supine: Stand by assistance (07/10/22 1139)  Bed Mobility Comments: HOB flat; no rail used; increased time and effort (07/10/22 1139)  Bed Mobility Training  Bed Mobility Training: Yes (07/18/22 1154)  Overall Level of Assistance: Modified independent (07/18/22 1154)  Interventions: Verbal cues (07/18/22 1154)  Rolling: Modified independent (07/18/22 1154)  Supine to Sit: Modified independent (07/18/22 1154)  Sit to Supine: Modified independent (07/18/22 1154)  Bed Mobility  Overall Assistance Level: Independent (07/15/22 7895)  Overall Assistance Level: Independent (07/15/22 1420)  Roll Left  Assistance Level: Independent (07/15/22 9238)  Roll Right  Assistance Level: Independent (07/15/22 0936)  Sit to Supine  Assistance Level: Independent (07/15/22 0936)  Supine to Sit  Assistance Level: Independent (07/15/22 0774)  Scooting  Assistance Level: Independent (07/15/22 0554)  Transfers:  Transfers  Sit to Stand: Independent (07/17/22 9100)  Stand to sit:  Independent (07/17/22 2290)  Bed to Chair: Stand by assistance (07/10/22 1143)  Comment: rollator used (07/17/22 1560)  Transfer Training  Transfer Training: Yes (07/18/22 1154)  Overall Level of Assistance: Modified independent (07/18/22 1154)  Interventions: Verbal cues (07/18/22 1154)  Sit to Stand: Modified independent (07/18/22 1154)  Stand to Sit: Modified independent (07/18/22 1154)  Sit to Stand  Assistance Level: Independent (07/15/22 1342)  Stand to Sit  Assistance Level: Independent (07/15/22 1342)  Skilled Clinical Factors: With fatigue patient sits quickly (07/13/22 1030)  Bed To/From Chair  Assistance Level: Independent (07/15/22 9547)  Car Transfer  Assistance Level: Independent (07/15/22 1194)  Skilled Clinical Factors: Increased effort from low level surface fair safety (07/12/22 1204)  Gait:   Ambulation  Surface: level tile (07/17/22 0924)  Device: Rollator (07/17/22 0924)  Assistance: Independent (07/17/22 0924)  Quality of Gait: wide JAKE (07/10/22 1144)  Gait Deviations: Slow Jean;Decreased step length;Decreased step height (07/10/22 1144)  Distance: 50 ft (07/17/22 0924)  Gait Training: Yes (07/18/22 1154)  Overall Level of Assistance: Supervision;Stand-by assistance (07/18/22 1154)  Distance (ft): 100 Feet (07/18/22 1154)  Assistive Device: Theador Corpus, rollator (07/18/22 1154)  Interventions: Verbal cues (07/18/22 1154)  Base of Support: Widened (07/18/22 1154)  Speed/Jean: Delayed (07/18/22 1154)  Step Length: Left shortened;Right shortened (07/18/22 1154)  Gait Abnormalities: Shuffling gait (07/18/22 1154)  Right Side Weight Bearing: As tolerated (07/18/22 1154)  Left Side Weight Bearing: As tolerated (07/18/22 1154)  Ambulation  Surface: Level surface; Uneven surface; Carpet; Ramp (07/15/22 1526)  Device: Rollator (07/15/22 1526)  Distance: 50' (07/15/22 1526)  Activity: Within Unit (07/15/22 1526)  Activity Comments: Reciprocal pattern good safety (07/15/22 1526)  Additional Factors: Verbal cues (07/15/22 1526)  Assistance Level:  Independent (07/15/22 1526)  Gait Deviations: Slow jean (07/15/22 0963)  Skilled Clinical Factors: 2# ankle weight added to improve foot clearance and endurance (07/13/22 1141)  Stairs:  Stairs/Curb  Stairs?: No (07/10/22 1144)  Stairs  Stair Height: 6'' (07/15/22 4929)  Device: Bilateral handrails (07/15/22 0943)  Number of Stairs: 10 (Performed for strengthening) (07/15/22 6421)  Additional Factors: Verbal cues; Non-reciprocal going up;Non-reciprocal going down (07/15/22 0943)  Assistance Level: Supervision (07/15/22 0943)  Skilled Clinical Factors: Fatigued post stairs descends retro (07/15/22 0943)  Curb  Curb Height: 4'' (07/15/22 1526)  Device:  (Rollator) (07/15/22 1526)  Number of Curbs: 4 (07/15/22 1526)  Additional Factors: Verbal cues; Set-up; Increased time to complete (07/15/22 1526)  Assistance Level: Stand by assist (07/15/22 1526)  Skilled Clinical Factors: Visual and verbal cues for proper technique.  Increased effort to complete (07/15/22 1526)  W/C mobility:           Pt and Family training goals-  Focus on energy conservation and consistency of function        OCCUPATIONAL THERAPY  Feeding  Assistance Level: Modified independent (07/14/22 0914)  Grooming/Oral Hygiene  Assistance Level: Modified independent (07/15/22 0859)  Skilled Clinical Factors: shaving; provided supervision/vc's for pt to transitin 4 w/w behind her to sit on, vc's for brake safety, pt able to s/u own shaving tasks and perform with mod Ind (07/15/22 0859)  Upper Extremity Bathing  Assistance Level: Minimal assistance (07/14/22 0914)  Skilled Clinical Factors: needed assist for creviced areas for thoroughness under breasts and stomach folds (07/13/22 0921)  Lower Extremity Bathing  Assistance Level: Minimal assistance (07/14/22 0914)  Skilled Clinical Factors: thoroughness with buttocks area- pt improved with belly folds and perineal folds anteriorly this day per discussion and training yesterday (07/14/22 0914)  Upper Extremity Dressing  Assistance Level: Modified independent (07/18/22 0848)  Skilled Clinical Factors: untangle shirt (07/11/22 0917)  Lower Extremity Dressing  Assistance Level: Stand by assist (07/18/22 0848)  Skilled Clinical Factors: SBA for pant management; discussed pt having daughter Patient is using her walker in the home. Patient has a hospital bed in the home. Social Determinants of Health     Financial Resource Strain: Not on file   Food Insecurity: Not on file   Transportation Needs: Not on file   Physical Activity: Sufficiently Active    Days of Exercise per Week: 3 days    Minutes of Exercise per Session: 60 min   Stress: Not on file   Social Connections: Not on file   Intimate Partner Violence: Not on file   Housing Stability: Not on file           THERAPY, MEDICAL AND NURSING COORDINATION:    [x]  Pain medication before therapies     [x]  Check orthostatic BP and monitor heart rate and medications effects with therapy      [x]  Ambulate to the bathroom in room    [x]  Add scheduled rest beaks     [x]  In room therapies as needed      Discharge date set for:              7/20/22      Home with:   Dtr, grand children and pt's disabled son  with help from   dtr who is her pain caregiver            And: Home Health Care:     [x]  PT    [x]  OT    []  ST   [x]  Aide   []  SW    [x]  RN                   Equipment:  [employer] Pure wik      At D/C their function is goaled at:   PT:Long term goal 1: Indep bed mobility  Long term goal 2: Indep transfers bed to chair with safest assistive device  Long term goal 3: Ambulate with rollator Indep on level and ramp surfaces >/= 50' to allow safe return home. Long term goal 4: Pt will demonstrate improved score on Ugalde balance assessment 48/56 for decreased risk for falls.   OT:Eating  Assistance Needed: Independent  Reason if not Attempted: Not attempted due to environmental limitations  CARE Score: 6  Discharge Goal: Independent, Oral Hygiene  Assistance Needed: Independent  CARE Score: 6  Discharge Goal: Independent, [address] needed: Partial/moderate assistance  Comment: Luis Crooks in place and brief on pt, needed assistance to clean following previous BM  CARE Score: 3  Discharge Goal: Supervision or touching assistance, Shower/Bathe Self  Assistance Needed: Partial/moderate assistance  CARE Score: 3  Discharge Goal: Independent  Upper Body Dressing  Assistance Needed: Independent  CARE Score: 6  Discharge Goal: Independent, Lower Body Dressing  Assistance Needed: Supervision or touching assistance  CARE Score: 4  Discharge Goal: Independent, Putting On/Taking Off Footwear  Assistance Needed: Independent  CARE Score: 6  Discharge Goal: Independent, Toilet Transfer  Assistance needed: Supervision or touching assistance  Reason if not Attempted: Patient refused  CARE Score: 4  Discharge Goal: Independent  SP:Long-term Goals  Timeframe for Long-term Goals: 1-2 weeks  Goal 1: Patient will demonstrate functional cognitive-linguistic abilities in all opportunities with modified independence in order to safely complete ADLs. Long-term Goals  Timeframe for Long-term Goals: 1-2 weeks  Goal 1: Patient will tolerate the least restrictive diet without adverse outcomes. From a cognitive standpoint they will need:        24 hr supervision  --progress to occasional           Significant problems/ barriers to functional progress include: Pt is at a high risk for functional loss,      []  Acute infection/UTI    []  Low BP's     []  COPD flare-up   []  Uncontrolled blood sugar     [x]  Progressive anemia     [x]  poor endurance    [x]  Severe pain exacerbation     [x]  Impaired mental status    [x]  Urinary incontinence    []  Bowel incontinence           Plan to correct barriers to functional progress: Add scheduled rest breaks, CoQ 10 and Vit B 12, control pain by using ice Lidoderm rest and massage as well as pain medications prior to therapy. Spread therapy of 15 hours out over a 7 day window to accommodate rest breaks and medical interventions. Patient seems to be making fair to good response to these interventions.          Based on a comprehensive evaluation of the above, the individualized therapy and Discharge plan will be:    -Times stated are an average that will be varied based on the patient's daily need. PT    1 1/2  hrs/day 5-7 days per week      OT    1 1/2 hrs per day 5-7 days per week     ST     1/2    hrs /day 3-5 days per week       Estimated LOS 1- 2 week(s)    - Overall functional prognosis:     [x]  Good    []  Fair    []  Poor -Medical Prognosis:   [x]  Good    []  Fair    []  Poor    This patient was made aware of the discussion of Plan of Care, their projected dicharge date and their projected function at discharge.          Samuel Angel DO

## 2022-07-18 NOTE — PROGRESS NOTES
OCCUPATIONAL THERAPY  INPATIENT REHAB TREATMENT NOTE  [facility]      NAME: Silvia Young  : 1960 (1 y.o.)  MRN: [id number]  CODE STATUS: Full Code  Room: R248/R248-01    Date of Service: 2022    Referring Physician: Dr. Simms Client  Rehab Diagnosis: impaired mobility and ADLs d/t polyneuropathy and recent fall    Restrictions  Restrictions/Precautions  Restrictions/Precautions: Fall Risk         Position Activity Restriction  Other position/activity restrictions: R Rib fractures - 10 and 11 per patient, dialysis port L chest, No Showering    Patient's date of birth confirmed: Yes    SAFETY:  Safety Devices  Type of devices: All fall risk precautions in place    SUBJECTIVE:  Subjective: \"I'm supposed to have dialysis any time now. \"  Pain: denies    COGNITION:     Comprehension: Independent  Expression: Independent  Social Interaction: Independent  Problem Solving: Supervision  Memory: Independent    OBJECTIVE:    Pt presented in w/c- pt already completed some ADLs with get up and go therapist as OT was attempting to work around her dialysis schedule. Physician presented during session- pt is to have her dialysis port fixed and then she can have her dialysis today. Pending on when that is completed will determine if she leaves today vs tomorrow      Upper Extremity Dressing  Assistance Level: Modified independent  Lower Extremity Dressing  Assistance Level: Stand by assist  Toileting  Assistance Level: Minimal assistance  Skilled Clinical Factors: Celeste needed for posterior hygiene thoroughness per pt - did not have to go  Toilet Transfers  Equipment: Standard toilet;Grab bars  Additional Factors:  With handrails;Verbal cues  Assistance Level: Stand by assist    Instrumental ADL's  Instrumental ADLs: Yes  Light Housekeeping  Light Housekeeping Level: Wheelchair  Light Housekeeping Level of Assistance: Modified independent  Light Housekeeping: laundry folding and item organization completed in seated position in prep for patient to d.c home. Good tolerance of the task     While seated, challenged strength, activity tolerance, ROM, and flexibility for improved ADL performance. Issued pt BUE HEP handout with instruction for completion     Challenged pt through usage of 1# weight to complete BUE exercises. 2 sets x 10 reps completed. Exercises focused on all UE joints and planes of motion including scapular protraction/retraction, shoulder flexion/extension/rotation/horizontal abduction, elbow flexion/extension, supination/pronation, and wrist/digit flexion/extension. Education:  Education  Education Given To: Patient  Education Provided: Plan of Care;Role of Therapy; ADL Function;Transfer Training; Safety;IADL Function; Fall Prevention Strategies  Education Method: Demonstration;Verbal  Education Outcome: Demonstrated understanding;Verbalized understanding    Equipment recommendations:   Pt has grab bars by toilet- declining a safety frame. Pt reports she mainly sponge bathes at home. No equipment needs. She has a rollator and AE for LB ADL tasks. Pt planning for outpatient therapy. ASSESSMENT:   Pt reporting fatigue but demonstrating good performance today.  IRFPAI updated    PLAN OF CARE:  Strengthening, Balance training, Functional mobility training, Endurance training, Safety education & training, Patient/Caregiver education & training, Equipment evaluation, education, & procurement, Self-Care / ADL, Home management training  continue with current POC until discharge of 22    Patient goals : work on strengthening my arms and legs, and improve balance so I don't fall     Therapy Time:   Individual Group Co-Treat   Time In 0830       Time Out 0930         Minutes 60             ADL/IADL trainin minutes  Therapeutic activities: 15 minutes   Electronically signed by:    Marielos Lynch OT,   2022, 9:17 AM

## 2022-07-18 NOTE — PLAN OF CARE
Problem: Discharge Planning  Goal: Discharge to home or other facility with appropriate resources  Outcome: Progressing  Flowsheets (Taken 7/17/2022 2107)  Discharge to home or other facility with appropriate resources: Identify barriers to discharge with patient and caregiver     Problem: Safety - Adult  Goal: Free from fall injury  Outcome: Progressing     Problem: Skin/Tissue Integrity  Goal: Absence of new skin breakdown  Description: 1. Monitor for areas of redness and/or skin breakdown  2. Assess vascular access sites hourly  3. Every 4-6 hours minimum:  Change oxygen saturation probe site  4. Every 4-6 hours:  If on nasal continuous positive airway pressure, respiratory therapy assess nares and determine need for appliance change or resting period.   Outcome: Progressing     Problem: ABCDS Injury Assessment  Goal: Absence of physical injury  Outcome: Progressing     Problem: Nutrition Deficit:  Goal: Optimize nutritional status  Outcome: Progressing     Problem: Chronic Conditions and Co-morbidities  Goal: Patient's chronic conditions and co-morbidity symptoms are monitored and maintained or improved  Outcome: Progressing  Flowsheets (Taken 7/17/2022 2107)  Care Plan - Patient's Chronic Conditions and Co-Morbidity Symptoms are Monitored and Maintained or Improved: Monitor and assess patient's chronic conditions and comorbid symptoms for stability, deterioration, or improvement

## 2022-07-18 NOTE — PROGRESS NOTES
Occupational Therapy Get up and Go Note            Date: 2022  Patient Name: Mirta Santiago        MRN: [id number]    Account #: [de-identified]  : 1960  (57 y.o.)      Subjective:  Patient states:  \"I'm supposed to have dialysis any time now. \"  Pain:  Pain at start of treatment: No    Pain at end of treatment: No          Objective:  ADL:  Feeding:  LISA  Grooming:  LISA  UE Self -Care:  LISA-- sponge bathing only  LE Self-Care:  Johnny-- assist with buttock hygiene due to BM residue; able to cedrick socks independently  Toileting:  NT  Toilet transfers:  NT    Pt completed bed mobility LISA; sponge bath ADL at the above level. Pt declined donning clothes due to pending dialysis at unknown time today. Treatment consisted of:   [x] ADL Training  [] Strengthening   [] Transfer Training    [] DME Education  [] HEP   [] Patient Education  [] Other:    Safety:  Safety Devices  Safety Devices in place:   Type of devices:  All fall risk precautions in place      Therapy Time:   Individual Group Co-Treat   Time In 0800       Time Out 0830         Minutes 30             ADL/IADL trainin minutes         Electronically signed by:    Samuel Martinez OT    2022, 8:37 AM

## 2022-07-18 NOTE — PROGRESS NOTES
[facility]  Facility/Department: [facility]  Speech Language Pathology   Treatment Note          Afua Mas  1960  Q415/K954-10  [x]   confirmed    Date: 2022    Rehab Diagnosis:        Restrictions/Precautions: Fall Risk  Position Activity Restriction  Other position/activity restrictions: R Rib fractures - 10 and 11 per patient, dialysis port L chest, No Showering    Weight: 193 lb 12.6 oz (87.9 kg)     ADULT DIET; Regular; 3 carb choices (45 gm/meal); Low Sodium (2 gm); Low Potassium (Less than 3000 mg/day); 1800 ml    SpO2: 98 % (22 0610)  No active isolations    Speech Dx: Dysphagia    Subjective:  Alert, Cooperative, and Pleasant        Interventions used this date:  Therapeutic Meal Monitor and Instruction in Compensatory Strategies    Objective/Assessment:  Patient progressing towards goals:    Short-term Goals  Timeframe for Short-term Goals: 1-2 weeks  Goal 1: Pt will complete lingual exercises that promote anterior to posterior propulsion of bolus and improve tongue base retraction with 80% accuracy in order to strengthen the muscles of the swallow to decrease risk of aspiration and to increase ability to safely handle the least restrictive diet level. Goal 2: Pt will complete effortful swallow exercise with 80% accuracy, given cues as needed, to decrease bolus residue in the valleculae per all consistencies. Goal 3: Pt will tolerate 5/5 trials of easy to chew solids with adequate mastication and oral clearance of bolus in all opportunities. Goal 4: Pt will tolerate the recommended diet level without overt s/s of aspiration in all opportunities. Pt participated in a therapeutic meal monitor with regular/thin diet. Pt had adequate oral clearance of bolus and no overt s/s of aspiration during the meal. Pt took small bites and alternated solids/liquids independently and had mild verbal cues for use of double swallow strategy.  SLP recommend continued regular/thin diet for d/c.       Treatment/Activity Tolerance:  Patient tolerated treatment well    Plan:  Continue per POC    Pain Assessment:  Patient does not c/o pain. Pain Re-assessment:  Patient does not c/o pain. Patient/Caregiver Education:  Patient educated on session and progression towards goals. Patient stated verbal understanding of directions.     Safety Devices:  Bed alarm in place and Call light within reach      Dysphagia Outcome Severity Scale    SWALLOWING  Ratin      Therapy Time  SLP Individual Minutes  Time In: 1127  Time Out: 1150  Minutes: 23              Signature: Electronically signed by ANN MARIE Cevallos on 2022 at 11:53 AM

## 2022-07-18 NOTE — PROGRESS NOTES
Subjective: The patient complains of  moderate to severe acute      partially relieved by rest, PT, OT, rest, pain medications and exacerbated by recent illness and exertion. Continues to have pain from her multiple rib fractures and is recovering from bilateral pleural effusions and balancing cardiac with renal risks. Nephrology is consulting and there titrating Lasix with caution. She is getting hemodialysis. I am concerned about patients medical complexities and current active problems and barriers to progress including hx of hep C and paranoid schitzophrenia. He is hoping to trial Bengay and heating pad for her achy muscles. Patient is getting nauseated when she is due for her dialysis due to toxic meds tabulate built up. She is responding to dialysis and Tigan. We also discussed her renal osteodystrophy pain and scheduling her Ultram every 8 hours she is in agreement. She does not feel that she is on the right dose of insulin and we will consult Dr. Steffany Ambriz. Podiatry was consulted for long toenails and ichthyosis. I have adjusted her vancomycin level checks. She is for hemodialysis on Saturday and discharge home on Sunday. Dr. Nguyễn Nassar and nephrology will work on her Lasix dose. They are balancing her renal and cardiac issues. He is hoping to have home health care at discharge and then transition to outpatient therapy. She is requesting tramadol as an outpatient for her acute on chronic pain she has acute rib fractures and chronic pain from renal osteodystrophy. Patient's discharge was extended because her fistula and dialysis catheter were both nonfunctional and she had to have some procedure done to her dialysis catheter. We are hoping it is functional today she get dialysis today and tomorrow and then be discharged. ROS x10: The patient also complains of severely impaired mobility and activities of daily living.   Otherwise no new problems with vision, hearing, nose, mouth, throat, dermal, cardiovascular, GI, , pulmonary, musculoskeletal, psychiatric or neurological. See Rehab H&P on Rehab chart dated . Vital signs:  /60   Pulse 75   Temp 98.4 °F (36.9 °C)   Resp 16   Ht 5' 7\" (1.702 m)   Wt 193 lb 12.6 oz (87.9 kg)   LMP  (LMP Unknown)   SpO2 98%   BMI 30.35 kg/m²   I/O:   PO/Intake:  fair PO intake, no problems observed or reported. Bowel/Bladder:  incontinent,Purwic constipation and urinary urgency. Last BM 7/9/22. General:  Patient is well developed, adequately nourished, non-obese and     well kempt. HEENT:    PERRLA, hearing intact to loud voice, external inspection of ear     and nose benign. Inspection of lips, tongue and gums  No teeth  Musculoskeletal: No significant change in strength or tone. All joints stable. Inspection and palpation of digits and nails show no clubbing,       cyanosis or inflammatory conditions. Neuro/Psychiatric: Affect: flat. Alert and oriented to person, place and     situation. No significant change in deep tendon reflexes or     Sensation    Lungs:  Diminished, CTA-B. Respiration effort is normal at rest.   fractured right 10-11 th ribs. tunneled Vascath left upper    chest with 2 ports. Heart:   S1 = S2, RRR. No loud murmurs. Abdomen:  Soft, non-tender, no enlargement of liver or spleen. Extremities:  Trace lower extremity edema,    tenderness. dialysis fistula graft to right upper arm that is not in use  Skin:   Intact   - discolored and bruised . ruise to back of right shoulder and right knee. Small sores noted to arms x 3     Rehabilitation:  Physical therapy: FIMS:  Bed Mobility: Scooting: Stand by assistance    Transfers: Sit to Stand: Independent  Stand to sit:  Independent  Bed to Chair: Stand by assistance, Ambulation  Surface: level tile  Device: Rollator  Assistance: Independent  Quality of Gait: wide JAKE  Gait Deviations: Slow Tere, Decreased step length, Decreased step height  Distance: 50 ft, FIMS:  ,  , Assessment: Pt tends to bend rt knee with prolonged standing vc to strengten. Pt with increased gait speed. Occupational therapy: FIMS:   ,  , Assessment: Pt is a 58 y/o female who presents to [facility] for medical decline with fall. Pt lives with dtr who assists with ADLs and IADLs PTA. Pt presents with above deficits and impaired ADL status. Pt may benefit from skilled OT intervention for increased indepdnence and safety upon d/c to home    Speech therapy: FIMS:        Lab/X-ray studies reviewed, analyzed and discussed with patient and staff:   Recent Results (from the past 24 hour(s))   POCT Glucose    Collection Time: 07/17/22 11:30 AM   Result Value Ref Range    POC Glucose 145 (H) 70 - 99 mg/dl    Performed on ACCU-CHEK    POCT Glucose    Collection Time: 07/17/22  4:00 PM   Result Value Ref Range    POC Glucose 153 (H) 70 - 99 mg/dl    Performed on ACCU-CHEK    POCT Glucose    Collection Time: 07/17/22  8:19 PM   Result Value Ref Range    POC Glucose 167 (H) 70 - 99 mg/dl    Performed on ACCU-CHEK    Vancomycin Level, Random    Collection Time: 07/18/22  4:54 AM   Result Value Ref Range    Vancomycin Rm 29.6 10.0 - 40.0 ug/mL   POCT Glucose    Collection Time: 07/18/22  5:52 AM   Result Value Ref Range    POC Glucose 123 (H) 70 - 99 mg/dl    Performed on ACCU-CHEK        Previous extensive, complex labs, notes and diagnostics reviewed and analyzed. ALLERGIES:    Allergies as of 07/09/2022 - Fully Reviewed 06/30/2022   Allergen Reaction Noted    Codeine Hives 12/09/2011    Oxycontin [oxycodone hcl] Hives 06/15/2020      (please also verify by checking MAR)     Today I evaluated this patient for periodic reassessment of medical and functional status.   The patient was discussed in detail at the treatment team meeting focusing on current medical issues, progress in therapies, social issues, psychological issues, barriers to progress and strategies to address these barriers, and discharge planning. See the addendum to rehab progress note-as a second progress note in the chart. The patient continues to be high risk for future disability and their medical and rehabilitation prognosis continue to be good and therefore, we will continue the patient's rehabilitation course as planned. The patient's tentative discharge date was set. Patient and family education was discussed. The patient was made aware of the team discussion regarding their progress. Complex Physical Medicine & Rehab Issues Assess & Plan:   Severe abnormality of gait and mobility and impaired self-care and ADL's secondary to progressive weakness dt OA flareup and  Polyneuropathy . Functional and medical status reassessed regarding patients ability to participate in therapies and patient found to be able to participate in acute intensive comprehensive inpatient rehabilitation program including PT/OT to improve balance, ambulation, ADLs, and to improve the P/AROM. Therapeutic modifications regarding activities in therapies, place, amount of time per day and intensity of therapy made daily. In bed therapies or bedside therapies prn. Bowel progressive constipation, and Bladder dysfunction monitoring neurogenic bladder,   Incontinence of urine:  frequent toileting, ambulate to bathroom with assistance, check post void residuals. Check for C.difficile x1 if >2 loose stools in 24 hours, continue bowel & bladder program.  Monitor bowel and bladder function. Lactinex 2 PO every AC. MOM prn, Brown Bomb prn, Glycerin suppository prn, enema prn. Severe chest wall pain as well as generalized OA pain, fracture T11 chronic pain of both shoulders: reassess pain every shift and prior to and after each therapy session, give prn Tylenol and Ultram, modalities prn in therapy, Lidoderm, K-pad prn. Okay for Ultram as an outpatient.   Patient will likely need to get her prescriptions in the future from pain management either myself or other pain management in the area. Skin healing and breakdown risk:  continue pressure relief program.  Daily skin exams and reports from nursing. Add co-Q10  Severe fatigue due to nutritional and hydration deficiency: Add vitamin B12 vitamin D and CoQ10 continue to monitor I&Os, calorie counts prn, dietary consult prn. Add healthy HS snack. Acute episodic insomnia with situational adjustment disorder:  consider prn low dose Ambien, monitor for day time sedation. Add HS \"Tuck In\"  Falls risk elevated:  patient to use call light to get nursing assistance to get up, bed and chair alarm. Elevated DVT risk: progressive activities in PT, continue prophylaxis MAHAMED hose, elevation and avoid Lovenox due to renal failure. Complex discharge planning:     Complete the medication simplification patient and family education as we transition from to planned pending discharge July 18 or 19, 2020 to home with her daughter who is her hired caregiver with follow-up hemodialysis friends  -Tuesday Thursday and Saturdays as well as home health care-eventually transitioning to outpatient therapy. Weekly team meeting  every Monday to re-assess progress towards goals, discuss and address social, psychological and medical comorbidities and to address difficulties they may be having progressing in therapy. Patient and family education is in progress. The patient is to follow-up with their family physician after discharge. Complex Active General Medical Issues that complicate care Assess & Plan:     1. Principal Problem:    Impaired mobility and activities of daily living dt polyneuropathy and reccent fall   Active Problems:  Chronic CHF, CKD (chronic kidney disease) stage 4, GFR 15-29 ml/min,   ESRD (end stage renal disease) on dialysis, Patient is getting nauseated when she is due for her dialysis due to toxic meds tabulate built up. She is responding to dialysis and Tigan.   We also discussed her renal osteodystrophy pain and scheduling her Ultram every 8 hours she is in agreement. Consult nephrology. Titrate Lasix-balance cardiac and renal risk    Closed T11 fracture renal osteodystrophy,   Multiple closed fractures of ribs of right side,   Chest wall contusion, left -Lidoderm, vitamin D, abdominal binder as tolerated    Encephalopathy acute,   Cerebral microvascular disease-limit toxic medications    MRSA bacteremia with Septicemia -IV vancomycin consult pharmacy for dosing monitor renal function and adjust dose. Chronic hepatitis C-eliminate toxic medications    Vitamin B 12 deficiency, Vitamin D insufficiency-Add high-dose vitamin D, recheck vitamin D level out after discharge,  Now with low blood pressure but history of essential (primary) hypertension,   Chronic diastolic congestive heart failure-Acute rehab to monitor heart rate and rhythm with the option of telemetry and the effects of chronotropic medication with respect to increasing physical activity and exercise in PT, OT, ADLs with medication titration to lowest effective dosing. Continue blood signs every shift focusing on heart rate, rhythm and blood pressure checks with orthostatic checks-monitoring the effect of exercise, therapy and posture. Consult hospitalist for backup medical and adjust/add medications (aspirin, Imdur, Lipitor, ProAmatine). Monitor heart rate and blood pressure as well as medications effects on vital signs before during and after therapy with especial focus on preventing orthostasis and falls risk.     Controlled type 2 diabetes mellitus with diabetic neuropathy, with long-term current use of insulin-Continue blood sugar checks every shift, diet, add diabetic add dietary education, restrict carbohydrates to lowest effective and safe carb count per meal advising 4 carbs per meal, add at bedtime snack to prevent a.m. hypoglycemia, adjust/add medications (Lantus, sliding scale insulin)     Pulmonary hypertension -Acute rehab for endurance traing with Pulse Ox to monitoring oxygen saturation and heart rate with O2 titration to lowest effective dose. Pulse oximeter checks to shift and at HS to dose and titrate oxygen and aerosol treatments monitor for nocturnal hypoxemia, monitor vital signs, oxygen prn. Focus on energy conservation. Paranoid schizophrenia -emotional support provided daily, vitamin B12, encourage participation in rehabilitation support group and recreational therapy, adjust/add medications -patient had been on Zoloft at home. Focus on endurance, activity pacing, reassessing rehab goals and discharge planning.         Siria Zelaya D.O., PM&R     Attending    [address]

## 2022-07-18 NOTE — FLOWSHEET NOTE
Patient going to [facility].  Electronically signed by Windy Garcia RN on 7/18/2022 at 2:54 PM

## 2022-07-18 NOTE — PROGRESS NOTES
Physical Therapy Rehab Treatment Note  Facility/Department: [facility]  Room: [facility]/V390-59       NAME: Wicho Bowman  : 1960 (57 y.o.)  MRN: [id number]  CODE STATUS: Full Code    Date of Service: 2022       Restrictions:fall risk          SUBJECTIVE:   Subjective: \"I can't stay awake right now, I'm worried about my dialysis and port\"    Pain  Pain: denies pain at the moment. OBJECTIVE:   Orientation  Overall Orientation Status: Within Functional Limits  Cognition  Overall Cognitive Status: WFL  Arousal/Alertness: Appropriate responses to stimuli         Bed Mobility Training  Bed Mobility Training: Yes  Overall Level of Assistance: Modified independent  Interventions: Verbal cues  Rolling: Modified independent  Supine to Sit: Modified independent  Sit to Supine: Modified independent  5  Transfer Training  Transfer Training: Yes  Overall Level of Assistance: Modified independent  Interventions: Verbal cues  Sit to Stand: Modified independent  Stand to Sit: Modified independent  5  Gait Training: Yes  Overall Level of Assistance: Supervision;Stand-by assistance  Distance (ft): 100 Feet  Assistive Device: Walker, rollator  Interventions: Verbal cues  Base of Support: Widened  Speed/Tere: Delayed  Step Length: Left shortened;Right shortened  Gait Abnormalities: Shuffling gait  Right Side Weight Bearing: As tolerated  Left Side Weight Bearing: As tolerated  5                 PT Exercises  Exercise Treatment: supine therex in bed. lateral trunk rotations, glute sets, quad sets. ankle pumps. slr and hip abduction. 5                     ASSESSMENT/PROGRESS TOWARDS GOALS: pt was not independent with her gt this am due to extreme fatigue. Goals:  Long Term Goals  Long term goal 1: Indep bed mobility  Long term goal 2: Indep transfers bed to chair with safest assistive device  Long term goal 3: Ambulate with rollator Indep on level and ramp surfaces >/= 50' to allow safe return home.   Long term goal 4: Pt will demonstrate improved score on [place] balance assessment 48/56 for decreased risk for falls. PLAN OF CARE/Safety: ongoing         Therapy Time:   Individual   Time In 1100   Time Out 1120   Minutes 20   Pt unable to stay awake and was falling asleep while performing supine exercises. Ended session early at pts requested due to extreme exhaustion. Pt reports staying up all night worried about her dialysis.    Minutes:20  Transfer/Bed mobility training:10  Gait trainin  Neuro re education:0  Therapeutic ex:5      Melvina Somers PTA, 22 at 12:01 PM

## 2022-07-18 NOTE — FLOWSHEET NOTE
Dialysis catheter changed and went to dialysis.  Electronically signed by Ramiro Keene RN on 7/18/2022 at 4:16 PM

## 2022-07-18 NOTE — PROGRESS NOTES
Endocrinology Progress Note    Assessment and Plan:   Assessment-  Type 2 insulin-dependent diabetes  Hyperglycemia  Polyneuropathy    Plan-  Lantus 25 units injected nightly  Humalog medium dose sliding scale  Monitor glycemic control closely, avoid hypoglycemia  Patient may be discharged home from endocrinology standpoint    POC Glucose:   Recent Labs     07/17/22  1130 07/17/22  1600 07/17/22 2019 07/18/22  0552 07/18/22  1123   POCGLU 145* 153* 167* 123* 143*     HGBA1C:  Lab Results   Component Value Date    LABA1C 6.0 (H) 07/14/2022    LABA1C 6.7 04/13/2022    LABA1C 6.8 12/13/2021     CBC:   No results for input(s): WBC, HGB, PLT in the last 72 hours. CMP:    Recent Labs     07/16/22  1756   *   K 4.6   CL 94*   CO2 23   BUN 39*   CREATININE 3.75*   GLUCOSE 166*   CALCIUM 9.6   LABGLOM 12.1*         CC: No chief complaint on file. Subjective: Interval History: Patient is a 58-year-old type 2 insulin-dependent diabetic female admitted to our rehabilitation unit for strengthening and conditioning. She was admitted for decreased functional status secondary to falls, with acute rib fractures. Patient states that her average glucose range at home is 180-220. She has had good glycemic control while here in the hospital.  She may be discharged home from endocrinology standpoint.     Review of systems: denies polyuria, polydipsia, ABD pain, flank pain, N/V/D, or diaphoresis  Medications:   Scheduled Meds:   insulin glargine  25 Units SubCUTAneous Nightly    furosemide  40 mg Oral Daily    traMADol  25 mg Oral 3 times per day    acetaminophen  500 mg Oral 3 times per day    Vitamin D  2,000 Units Oral Dinner    cyanocobalamin  1,000 mcg IntraMUSCular Weekly    coenzyme Q10  100 mg Oral Daily    ARIPiprazole  5 mg Oral Daily    aspirin EC  81 mg Oral Daily    atorvastatin  40 mg Oral Nightly    insulin lispro  0-12 Units SubCUTAneous TID WC    insulin lispro  0-6 Units SubCUTAneous Nightly isosorbide mononitrate  120 mg Oral Daily    lidocaine  3 patch TransDERmal Daily    magnesium oxide  400 mg Oral Daily    melatonin  10 mg Oral Nightly    midodrine  5 mg Oral Once per day on Mon Wed Fri    polyethylene glycol  17 g Oral Daily    sertraline  50 mg Oral Nightly    vancomycin (VANCOCIN) intermittent dosing (placeholder)   Other RX Placeholder     Continuous Infusions:   sodium chloride      dextrose         Objective:   Vitals: /60   Pulse 75   Temp 98.4 °F (36.9 °C)   Resp 16   Ht 5' 7\" (1.702 m)   Wt 193 lb 12.6 oz (87.9 kg)   LMP  (LMP Unknown)   SpO2 98%   BMI 30.35 kg/m²    Wt Readings from Last 3 Encounters:   07/16/22 193 lb 12.6 oz (87.9 kg)   07/08/22 189 lb 9.5 oz (86 kg)   06/22/22 217 lb (98.4 kg)        General appearance: alert, appears stated age, cooperative, and no distress  Skin: Skin color, texture, turgor normal. No rashes or lesions. Neck: thyroid normal size, non-tender,  without nodularity  Lungs: clear to auscultation bilaterally  Heart: regular rate and rhythm, S1, S2 normal, no murmur, click, rub or gallop  Abdomen: soft, non-tender. Bowel sounds normal. No masses,  no organomegaly.   Extremities: extremities normal, atraumatic, no cyanosis or edema    Patient Active Problem List:     Atherosclerotic heart disease of native coronary artery with unspecified angina pectoris (HCC)     Schizophrenia, paranoid, chronic     Metabolic syndrome     Vitamin B 12 deficiency     Cerebral microvascular disease     Mixed hyperlipidemia     Other hammer toe (acquired)     Vitamin D insufficiency     Incontinence of urine     Diabetic nephropathy with proteinuria ([facility] [address]     Essential (primary) hypertension     History of type C viral hepatitis     Urinary incontinence due to cognitive impairment     History of seizures     Stented coronary artery-plan is to stay on Plavix indefinately per Dr Saria Aranda     Other specified diabetes mellitus with diabetic neuropathy, unspecified ([facility] [address]     Controlled type 2 diabetes mellitus with diabetic neuropathy, with long-term current use of insulin ([facility])     Hemiparesis, left ([facility])     Angina, class II ([facility])     Pain, unspecified     Tardive dyskinesia     Shortness of breath     Uncontrolled type 2 diabetes mellitus with hyperglycemia ([facility])     Chronic diastolic congestive heart failure ([facility])     Sleep apnea, unspecified     Pulmonary hypertension, unspecified ([facility])     Class 2 severe obesity with serious comorbidity and body mass index (BMI) of 36.0 to 36.9 in adult [facility])     Edema     Closed supracondylar fracture of right humerus     Other chronic pain     Palliative care patient     Recurrent falls     Renal failure     Difficulty in walking     ESRD (end stage renal disease) on dialysis ([facility])     Weakness     Other seizures ([facility])     Moderate persistent asthma without complication     OSQUL-19     Post PTCA     Falls frequently     Chest wall contusion, left, initial encounter     Headache, unspecified     Paranoid schizophrenia ([address]     Compression fracture of spine ([address]     Closed rib fracture     Depression     Chronic obstructive pulmonary disease ([facility])     Critical illness polyneuropathy ([facility] [address]     Multiple closed fractures of ribs of right side     Nonrheumatic mitral valve regurgitation     Nonrheumatic tricuspid valve regurgitation     Need for extended care facility     Chronic pain of both shoulders     Hemodialysis-associated hypotension     Anginal chest pain at rest [facility])     Chest pain     Unstable angina ([facility])     NSTEMI (non-ST elevated myocardial infarction) ([facility])     CKD (chronic kidney disease) stage 4, GFR 15-29 ml/min ([facility])     Hyperkalemia     Impaired mobility and activities of daily living dt polyneuropathy and reccent fall      Dialysis patient [facility])     Unspecified open wound of right upper arm, initial encounter     Multiple closed fractures of right lower extremity and ribs     Closed T11 fracture ([address] Encephalopathy acute     MRSA bacteremia     Sepsis due to Enterococcus [facility])     Local infection due to central venous catheter     DJD (degenerative joint disease), cervical     Closed head injury     Sarcopenia     Fall from standing     Septicemia ([facility] [address]     Chronic hepatitis C ([facility] [address]     Catheter-related bloodstream infection     Enterococcus faecalis infection     Cervical stenosis of spinal canal     Ataxic gait     Lumbar stenosis with neurogenic claudication     Falls infrequently        Electronically signed by MARCELO Boucher on 7/18/2022 at 2:37 PM

## 2022-07-18 NOTE — OR NURSING
NO SEDATION    Pt arrived to specials via floor bed. Pt A&Ox4, respirations even and unlabored, and existing left IJ tunneled HD cath site WNL except for large clot at insertion site. Pt transferred to IR table in supine position with help of 4 staff. Patient placed onto specials monitor. Vitals signs stable. Consent obtained for Tunneled Hemodialysis catheter exchange. Both red and blue port +blood aspirated and flush well. Dr. Momo Marmolejo notified. Fluoro images taken and reviewed by Dr. Momo Marmolejo. Dr. Momo Marmolejo injected contrast via catheter. Plan to exchange catheter. Existing left hemodialysis site dressing removed and generously prepped with large tinted chloraprep by [employer]. Once site dry, full body drape applied using sterile technique by [employer]. Time out done. Dr. Momo Marmolejo administered 2% lidocaine to existing hemodialysis site. Copilot control valve attached to port of existing hemodialysis catheter and guidewire inserted through existing hemodialysis catheter under fluoro guidance. Catheter removed over the wire. Symetrex tunneled  15.5F by 28cm HD catheter ( Lot #  [id number]  Exp 02/12/26). Placement verified with fluoro. Red port aspirate and inject appropriately per Dr. Momo Marmolejo. Blue port aspirates and injects appropriately per Dr. [employer] order from Dr. Momo Marmolejo ok to use catheter for dialysis. Heparin instilled into red and blue 2.5 ml into each port. 1 ml of additional 2 % lidocaine injected to place 2-0 prolene sutures to secure the catheter to the patient's skin. Sutures placed without complications. Patient tolerated procedure well. No ectopy noted on monitor. VSS. Catheter site dressed with Biopatch, 4x4 and Tegaderm. Pt. was given support and reassurance throughout procedure. Pt transferred off table and back to bed with staff x 4. Pt in for transport to dialysis. Report called to [facility] on rehab. Electronically signed by Acacia Cruz RN on 7/18/2022 at 4:04 PM

## 2022-07-18 NOTE — PROGRESS NOTES
[facility]   Acute  Rehabilitation  MUSIC THERAPY      Date:  7/18/2022        Patient Name: Ilia Retana       MRN: [id number]        YOB: 1960 (60 y.o.)       Gender: female  Diagnosis: impaired mobility and ADLs d/t polyneuropathy and recent fall  Referring Practitioner: Dr. Shabana Dos Santos    RESTRICTIONS/PRECAUTIONS:  Restrictions/Precautions: Fall Risk  Vision: Impaired  Hearing: Within functional limits      TIME OF SESSION: 12:15pm - 12:35pm     SUBJECTIVE: \"sure\"    OBJECTIVE:        [x] To Improve Mood     [x] To Increase Social Well-Being  [] To Increase Focus   [x] To Increase Emotional Well-Being  [] To Increase Eye Contact    [] To Increase Spiritual Well-Being   [] To Decrease Anxiety   [x] To Increase Relaxation   [] To Decrease Pain    [] To Increase Communication  [] To Increase Movement to Music     MUSIC INTERVENTION PROVIDED:     [x] Live Music on Voice  [] Recorded Music   [x] Live Music on Guitar  [x] Discussion Related to Music   [] Live Music on Q-chord  [x] Discussion Related to Pt Experience   [] Live Music on Percussion      PARTICIPATION LEVEL OF PATIENT:     [x] Active with discussion   [] Passive with discussion   [] Active with singing    [x] Passive with singing   [] Active with instrument playing  [] Passive with instrument playing   [x] Actively listening to music   [] Passively listening to music.      OUTCOMES OBSERVED:      [x] Improved Mood   [x] Increased Social Well-Being  [] Increased Focus   [x] Increased Emotional Well-Being  [] Increased Eye Contact    [] Increased Spiritual Well-Being   [] Decreased Anxiety   [x] Increased Relaxation   [] Decreased Pain    [] Increased Communication   [] Increased Movement to Music     PAIN ASSESSMENT    Before MT:      [x] No     [] Yes   Location:    Rating:  /10    Comment(s):    After MT:         [x] No     [] Yes   Location:     Rating:   /10    Comment(s):     ANXIETY ASSESSMENT    Before MT:      [x] No     [] Yes   Rating:  /10    Comment(s):    After MT:         [x] No     [] Yes   Rating:   /10    Comment(s):       ASSESSMENT/OBSERVATIONS:     Patient's music interests and/or background: country    Patient tolerated todays treatment session:      [x] Good          [] Fair          [] Poor         Comment(s): Patient found lying in bed when [facility] arrived to offer music therapy services. Patient accepted music therapy visit. MT-BC provided live, patient preferred music on guitar and voice. Patient actively engaged in the music therapy by discussing topics related to their hospital stay, discussing topics related to the music,  and by actively listening to the music. Patient responded positively to the music therapy as evidence by improved mood, increased relaxation and by making positive comments about the music therapy. PLAN:      [] Pt interested in having music therapy again. [] MT-BC will attempt to see pt again another day, if time allows. [x] Pt's planned d/c date is before MT-BC is scheduled on unit again. [] Pt NOT interested in having music therapy again.             REMEDIOS WhiteBC    7/18/2022  Electronically signed by Sienna Lynch on 7/18/2022 at 12:38 PM

## 2022-07-18 NOTE — PROGRESS NOTES
[address] [facility] Pharmacokinetic Monitoring Service - Vancomycin    Consulting Provider: Dr. Bibiana Powers    Indication: infected dialysis cath   Target Concentration: Pre-Dialysis Concentration 21-24 mg/L  Day of Therapy: 18  Additional Antimicrobials: none at this time     Pertinent Laboratory Values: Wt Readings from Last 1 Encounters:   07/16/22 193 lb 12.6 oz (87.9 kg)     Temp Readings from Last 1 Encounters:   07/18/22 98.4 °F (36.9 °C)     Recent Labs     07/16/22  1756   CREATININE 3.75*     Procalcitonin: N/A    Pertinent Cultures:  Blood cultures from admission 2 sets growing gram-positive cocci in chains  Identified as Enterococcus faecalis by PCR    MRSA Nasal Swab: N/A. Non-respiratory infection.     Recent vancomycin administrations                     vancomycin (VANCOCIN) 1,500 mg in dextrose 5 % 500 mL IVPB (ADDAVIAL) (mg) 1,500 mg New Bag 07/17/22 0338                    Assessment:  Date/Time Current Dose Concentration Dialysis Session   07/18/22 1500mg IV X 1 dose yesterday  29.4mg/L  Today      Plan:  Concentration-guided dosing due to renal impairment and intermittent hemodialysis   Current dosing regimen of 1500mg post dialysis is supra-therapeutic   Decrease dose to HOLD vancomycin today  Vancomycin concentration ordered for 07/20/22  @ 0600, prior to HD session   Pharmacy will continue to monitor patient and adjust therapy as indicated    Thank you for the consult,    Paco Reid, KyleighD, BCPS   7/18/2022 3:53 PM

## 2022-07-18 NOTE — PROGRESS NOTES
Physical Therapy Rehab Treatment Note  [facility]       NAME: Florence Cary  : 1960 (10 y.o.)  MRN: [id number]  CODE STATUS: Full Code    Date of Service: 2022       Restrictions:  Restrictions/Precautions: Fall Risk  Position Activity Restriction  Other position/activity restrictions: L Rib fractures - 10 and 11 per patient       SUBJECTIVE:   Subjective: \" I did not sleep at tall last night    Pain  Pain: denies pain at the moment. OBJECTIVE:     Bed Mobility  Overall Assistance Level: Independent  Roll Left  Assistance Level: Independent  Roll Right  Assistance Level: Independent  Sit to Supine  Assistance Level: Independent  Supine to Sit  Assistance Level: Independent  Scooting  Assistance Level: Independent    Sit to Stand  Assistance Level: Independent  Stand to Sit  Assistance Level: Independent  Bed To/From Chair  Assistance Level: Independent    Ambulation  Surface: Level surface  Device: Rollator  Distance: Distances limited to in room  Activity: Within Room  Activity Comments: Reciprocal pattern good safety  Assistance Level: Independent    PT Exercises  Exercise Treatment: supine therex in bed. lateral trunk rotations, glute sets, quad sets. ankle pumps. slr and hip abduction. x 15     ASSESSMENT/PROGRESS TOWARDS GOALS:   Assessment  Assessment: Patient shows good safety with bed mobility, transfers and gait training. Session limited to in room therapy secondary to patient to leave floor for a procedure. Activity Tolerance: Patient tolerated treatment well    Goals:  Long Term Goals  Long term goal 1: Indep bed mobility  Long term goal 2: Indep transfers bed to chair with safest assistive device  Long term goal 3: Ambulate with rollator Indep on level and ramp surfaces >/= 50' to allow safe return home. Long term goal 4: Pt will demonstrate improved score on Ugalde balance assessment 48/56 for decreased risk for falls.     PLAN OF CARE/Safety: Safety Devices  Type of Devices: All fall risk precautions in place; Bed alarm in place;Call light within reach      Therapy Time:   Individual   Time In 1300   Time Out 1330   Minutes 30     Minutes: 30  Transfer/Bed mobility training: 10  Gait trainin  Therapeutic ex: [address], PTA, 22 at 4:19 PM

## 2022-07-18 NOTE — PROGRESS NOTES
Patient as been up to BR x 3 this shift as of now. Up with W/W- SBA- steady with walker, mindful of surrondings. Satisfied with control of pain. Pt states she will be going home Tuesday per \"DRMinh \". pt to have HD Monday  ??if AM or afternoon- also was told by pt Dr. Kain Bailey is to see her before D/C. Call light in reach of pt as well as bedside table in reach.

## 2022-07-18 NOTE — CARE COORDINATION
[address] [facility]  INPATIENT REHABILITATION  TEAM CONFERENCE NOTE  Room: R248/R248-01  Admit Date: 2022       Date: 2022  Patient Name: Supriya Barnett        MRN: [id number]    : 1960  (60 y.o.)  Gender: female        REHAB DIAGNOSIS:        CO MORBIDITIES:      Past Medical History:   Diagnosis Date    Angina at rest [facility]) 2020    Anxiety     CAD S/P percutaneous coronary angioplasty ,     stents per dr Frank Vasquez    CHF (congestive heart failure) ([facility]     CKD (chronic kidney disease) stage 4, GFR 15-29 ml/min ([facility] 2018    CKD stage 4 due to type 2 diabetes mellitus ([address]     Contusion of right chest wall 2021    COPD (chronic obstructive pulmonary disease) ([address]     Diabetic nephropathy with proteinuria ([facility] 2014    DJD (degenerative joint disease) of knee     Dr Karen Kuo    GERD (gastroesophageal reflux disease)     Hemiparesis, left ([facility] 2013    entered Assisted Living ([facility])    Hemodialysis patient [facility])     Hemodialysis-associated hypotension 10/22/2021    History of heart failure     History of seizures     History of type C viral hepatitis     HTN (hypertension)     Hyperlipidemia     Impaired mobility and activities of daily living     Mediastinal lymphadenopathy     Amelia Araiza    Metabolic syndrome     Moderate persistent asthma without complication     Need for extended care facility 2021    Neurogenic urinary incontinence 2013    Neuropathy in diabetes ([address]     Nonrheumatic mitral valve regurgitation 2021    Nonrheumatic tricuspid valve regurgitation 2021    Obesity (BMI 30-39. 9)     Recurrent UTI     S/P colonoscopy     [facility], focal active colitis    Schizophrenia, paranoid, chronic ([address]     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 Mae Ulloa      x1 Dr. Arron Cruz, Dr Edy Cordova  08/10/2021    [facility]    DIAGNOSTIC CARDIAC CATH LAB PROCEDURE  10/02/2019    DIALYSIS CATHETER INSERTION Left 2022    Tunneled Symetrex 15.5F x 23cm hemodialysis catheter inserted by Dr. Zackery Jiménez, TOTAL ABDOMINAL (CERVIX REMOVED)      one ovary intact, Dr Jaiden Tristan, menorrhagia    IR THROMBECTOMY PERCUT AVF  2022    IR THROMBECTOMY PERCUT AVF 2022 [facility] PROCEDURE    IR TUNNELED CATHETER PLACEMENT GREATER THAN 5 YEARS  2022    IR TUNNELED CATHETER PLACEMENT GREATER THAN 5 YEARS 6/3/2022 [facility] PROCEDURE    IR TUNNELED CATHETER PLACEMENT GREATER THAN 5 YEARS Left 2022    15.5 fr by 23 cm Symetrex dialysis catheter inserted by Dr Urszula Covarrubias    IR TUNNELED [address] 5 YEARS  2022    IR TUNNELED CATHETER PLACEMENT GREATER THAN 5 YEARS 2022 [facility] PROCEDURE    AL TOTAL KNEE ARTHROPLASTY Left 2018    LEFT KNEE TOTAL KNEE ARTHROPLASTY, SHAYNA, NERVE BLOCK [facility]     TOTAL KNEE ARTHROPLASTY  2016    Dr Mcdaniels Och    TUNNELED [address] Right 2020    tunneled HD catheter per Dr Urszula Covarrubias        Restrictions  Restrictions/Precautions: Fall Risk  Position Activity Restriction  Other position/activity restrictions: R Rib fractures - 10 and 11 per patient, dialysis port L chest, No Showering  CASE gm); Low Potassium (Less than 3000 mg/day); 1800 ml    SpO2: 98 % (07/18/22 0610)    Oxygen to be continued upon discharge: No  Home-going needs (nocturnal Pox, sleep study, RX, equipment): No    Skin Issues: No  Home-going needs (education, training, RX, Wound RN): No    Pain Managed: No    Bladder continence: Yes  Discharging with Howard: No   Training done: No   Urology following: No   Plan/date to remove howard: No   Bladder retraining started: No    Bowel continence:  Yes  Last BM date: 7/15/22  Need for bowel program: No    Anticoagulants: n/a  Home-going needs (Education, labs): No    Antibiotics: vancomycin  Stop date:   Home-going needs (education, RX, PICC): No      Other: Dr Aaron Fofana to replace dialysis catheter today        PHYSICAL THERAPY  Bed mobility:  Bed mobility  Rolling to Left: Modified independent (07/10/22 1146)  Rolling to Right: Modified independent (07/10/22 1146)  Supine to Sit: Stand by assistance (07/10/22 1139)  Sit to Supine: Stand by assistance (07/10/22 1139)  Bed Mobility Comments: HOB flat; no rail used; increased time and effort (07/10/22 1139)  Bed Mobility Training  Bed Mobility Training: Yes (07/18/22 1154)  Overall Level of Assistance: Modified independent (07/18/22 1154)  Interventions: Verbal cues (07/18/22 1154)  Rolling: Modified independent (07/18/22 1154)  Supine to Sit: Modified independent (07/18/22 1154)  Sit to Supine: Modified independent (07/18/22 1154)  Bed Mobility  Overall Assistance Level: Independent (07/15/22 1360)  Overall Assistance Level: Independent (07/15/22 8968)  Roll Left  Assistance Level: Independent (07/15/22 4281)  Roll Right  Assistance Level: Independent (07/15/22 0936)  Sit to Supine  Assistance Level: Independent (07/15/22 0936)  Supine to Sit  Assistance Level: Independent (07/15/22 3774)  Scooting  Assistance Level: Independent (07/15/22 0316)  Transfers:  Transfers  Sit to Stand: Independent (07/17/22 6945)  Stand to sit:  Independent (07/17/22 5174)  Bed to Chair: Stand by assistance (07/10/22 1143)  Comment: rollator used (07/17/22 3374)  Transfer Training  Transfer Training: Yes (07/18/22 1154)  Overall Level of Assistance: Modified independent (07/18/22 1154)  Interventions: Verbal cues (07/18/22 1154)  Sit to Stand: Modified independent (07/18/22 1154)  Stand to Sit: Modified independent (07/18/22 1154)  Sit to Stand  Assistance Level: Independent (07/15/22 1342)  Stand to Sit  Assistance Level: Independent (07/15/22 1342)  Skilled Clinical Factors: With fatigue patient sits quickly (07/13/22 1030)  Bed To/From Chair  Assistance Level: Independent (07/15/22 4122)  Car Transfer  Assistance Level: Independent (07/15/22 0711)  Skilled Clinical Factors: Increased effort from low level surface fair safety (07/12/22 1204)  Gait:   Ambulation  Surface: level tile (07/17/22 0924)  Device: Rollator (07/17/22 0924)  Assistance: Independent (07/17/22 0924)  Quality of Gait: wide JAKE (07/10/22 1144)  Gait Deviations: Slow Tere;Decreased step length;Decreased step height (07/10/22 1144)  Distance: 50 ft (07/17/22 0924)  Gait Training: Yes (07/18/22 1154)  Overall Level of Assistance: Supervision;Stand-by assistance (07/18/22 1154)  Distance (ft): 100 Feet (07/18/22 1154)  Assistive Device: gavin Steinerator (07/18/22 1154)  Interventions: Verbal cues (07/18/22 1154)  Base of Support: Widened (07/18/22 1154)  Speed/Tere: Delayed (07/18/22 1154)  Step Length: Left shortened;Right shortened (07/18/22 1154)  Gait Abnormalities: Shuffling gait (07/18/22 1154)  Right Side Weight Bearing: As tolerated (07/18/22 1154)  Left Side Weight Bearing: As tolerated (07/18/22 1154)  Ambulation  Surface: Level surface; Uneven surface; Carpet; Ramp (07/15/22 1526)  Device: Rollator (07/15/22 1526)  Distance: 50' (07/15/22 1526)  Activity: Within Unit (07/15/22 1526)  Activity Comments: Reciprocal pattern good safety (07/15/22 1526)  Additional Factors: Verbal cues (07/15/22 1526)  Assistance Level: Independent (07/15/22 1526)  Gait Deviations: Slow jean (07/15/22 0943)  Skilled Clinical Factors: 2# ankle weight added to improve foot clearance and endurance (07/13/22 1141)  Stairs:  Stairs/Curb  Stairs?: No (07/10/22 1144)  Stairs  Stair Height: 6'' (07/15/22 9020)  Device: Bilateral handrails (07/15/22 0943)  Number of Stairs: 10 (Performed for strengthening) (07/15/22 3887)  Additional Factors: Verbal cues; Non-reciprocal going up;Non-reciprocal going down (07/15/22 0943)  Assistance Level: Supervision (07/15/22 0943)  Skilled Clinical Factors: Fatigued post stairs descends retro (07/15/22 0943)  Curb  Curb Height: 4'' (07/15/22 1526)  Device:  (Rollator) (07/15/22 1526)  Number of Curbs: 4 (07/15/22 1526)  Additional Factors: Verbal cues; Set-up; Increased time to complete (07/15/22 1526)  Assistance Level: Stand by assist (07/15/22 1526)  Skilled Clinical Factors: Visual and verbal cues for proper technique. Increased effort to complete (07/15/22 1526)  W/C mobility:     LTG:  Long term goal 1: Indep bed mobility  Long term goal 2: Indep transfers bed to chair with safest assistive device  Long term goal 3: Ambulate with rollator Indep on level and ramp surfaces >/= 50' to allow safe return home. Long term goal 4: Pt will demonstrate improved score on Ugalde balance assessment 48/56 for decreased risk for falls.   PT Treatment Time:  1.5 hrs      OCCUPATIONAL THERAPY    EVALUATION SELF CARE STATUS:  Hand Dominance: Left  Equipment Provided: Sock aid (07/15/22 1551)  Feeding: Modified independent  (07/15/22 1551)  Feeding Skilled Clinical Factors: pt done with breakfast (07/10/22 1142)  Grooming: Modified independent  (07/15/22 1551)  UE Bathing: Supervision (07/15/22 1551)  LE Bathing: Minimal assistance (07/15/22 1551)  LE Bathing Skilled Clinical Factors: assist to wash buttocks and Lower legs (07/10/22 1142)  UE Dressing: Supervision (07/15/22 0479)  LE Dressing: Minimal assistance (07/15/22 1551)  LE Dressing Skilled Clinical Factors: assist to thread pant legs and don socks (07/10/22 1142)  Toileting: Minimal assistance (07/15/22 1551)  Toileting Skilled Clinical Factors: PTREQUIRES ADDITIONAL ASSIST FOR THOROUGHNESS FOR POSTERIOR HYGIENE (07/15/22 1551)  Additional Comments: pts dtr assists with ADLs at home (bathing and dressing LB ADLs and toileting) (07/10/22 1142)             CURRENT SELF CARE:  Feeding  Assistance Level: Modified independent (07/14/22 0914)  Grooming/Oral Hygiene  Assistance Level: Modified independent (07/15/22 0859)  Skilled Clinical Factors: shaving; provided supervision/vc's for pt to transitin 4 w/w behind her to sit on, vc's for brake safety, pt able to s/u own shaving tasks and perform with mod Ind (07/15/22 0859)  Upper Extremity Bathing  Assistance Level: Minimal assistance (07/14/22 0914)  Skilled Clinical Factors: needed assist for creviced areas for thoroughness under breasts and stomach folds (07/13/22 0921)  Lower Extremity Bathing  Assistance Level: Minimal assistance (07/14/22 0914)  Skilled Clinical Factors: thoroughness with buttocks area- pt improved with belly folds and perineal folds anteriorly this day per discussion and training yesterday (07/14/22 0914)  Upper Extremity Dressing  Assistance Level: Modified independent (07/18/22 0848)  Skilled Clinical Factors: untangle shirt (07/11/22 0917)  Lower Extremity Dressing  Assistance Level: Stand by assist (07/18/22 0848)  Skilled Clinical Factors: SBA for pant management; discussed pt having daughter bring in a couple pull ups to practice with for accurate (07/14/22 0914)  Putting On/Taking Off Footwear  Equipment Provided: Sock aid (07/15/22 0859)  Assistance Level: Minimal assistance (07/15/22 0859)  Skilled Clinical Factors: DEP A for foot washing/drying(pt daughter does this at home); able to don socks (R foot regularly and L foot with sock aide needing task started d/t tightness of top of sock) (07/15/22 0859)  Toileting  Assistance Level: Minimal assistance (07/18/22 0848)  Skilled Clinical Factors: Celeste needed for posterior hygiene thoroughness per pt - did not have to go (07/18/22 0848)  Toilet Transfers  Technique: Stand step (07/13/22 1323)  Equipment: Standard toilet;Grab bars (07/18/22 0848)  Additional Factors:  With handrails;Verbal cues (07/18/22 0848)  Assistance Level: Stand by assist (07/18/22 0848)  Tub/Shower Transfers  Skilled Clinical Factors: sponge bath today d/t dialysis port-L chest (07/14/22 0914)        LTG:  Eating  Assistance Needed: Independent  Reason if not Attempted: Not attempted due to environmental limitations  CARE Score: 6  Discharge Goal: Independent, Oral Hygiene  Assistance Needed: Independent  CARE Score: 6  Discharge Goal: Independent, [address] needed: Partial/moderate assistance  Comment: Nivia Taylor in place and brief on pt, needed assistance to clean following previous BM  CARE Score: 3  Discharge Goal: Supervision or touching assistance, Shower/Bathe Self  Assistance Needed: Partial/moderate assistance  CARE Score: 3  Discharge Goal: Independent  Upper Body Dressing  Assistance Needed: Independent  CARE Score: 6  Discharge Goal: Independent, Lower Body Dressing  Assistance Needed: Supervision or touching assistance  CARE Score: 4  Discharge Goal: Independent, Putting On/Taking Off Footwear  Assistance Needed: Independent  CARE Score: 6  Discharge Goal: Independent, Toilet Transfer  Assistance needed: Supervision or touching assistance  Reason if not Attempted: Patient refused  CARE Score: 4  Discharge Goal: Independent  OT Treatment Time: 1.5 hrs      SPEECH THERAPY       Comprehension: Within Functional Limits  Verbal Expression: Within functional limits      Diet/Swallow:        Dysphagia Outcome Severity Scale: Level 6: Within functional limits/Modified independence    Compensatory Swallowing Strategies : Upright as possible for all oral intake, Small bites/sips, Swallow 2 times per bite/sip  Therapeutic Interventions: Bolus control exercises, Pharyngeal exercises, Diet tolerance monitoring, Patient/Family education      LTG:  Long-term Goals  Timeframe for Long-term Goals: 1-2 weeks  Goal 1: Patient will demonstrate functional cognitive-linguistic abilities in all opportunities with modified independence in order to safely complete ADLs. Long-term Goals  Timeframe for Long-term Goals: 1-2 weeks  Goal 1: Patient will tolerate the least restrictive diet without adverse outcomes. COGNITION  OT: Cognition Comment: comp: SUP, exp: SUP, soc int: MIN A, prob solving: MIN A, mem: SUP  SP:        RECREATIONAL THERAPY  Attendance to recreational therapy programs:    []  Pet Therapy  [] Music Therapy  [] Art Therapy    [] Recreation Therapy Group [] Support Group           Patient social interaction (mood, participation): good    Patient strengths: dtr supportive    Patients goal: return home with dtr    Problems/Barriers: waiting on dialysis cath to be replaced        1. Safety:          - Intervention / Plan:    [x]  falls protocol     [x]  PT/OT    [x]  SP        - Results:         2. Potential DME needs:         - Intervention / Plan:  [x]  PT/OT     [x]  Assess equipment needs/access       - Results:         3. Weakness:          - Intervention / Plan:  [x]  PT/OT      []  Other:         - Results:         4. Discharge planning needs:          - Intervention / Plan:  [x]  Weekly team conference      [x]  family training        - Results:         5.            - Intervention / Plan:          - Results:         6.            - Intervention / Plan:         - Results:         7.            - Intervention / Plan:         - Results:           Discharge Plan   Estimated Length of Stay: 13 days    Tentative Discharge date: 7/20/22      Anticipated Discharge Destination:  Home      Team recommendations:    1. Follow up Therapy :    PT  OT  SLP    2. Outpatient at [facility]    Other:     Equipment needed at Discharge:  Other: has all DME needed      Team Members Present at Conference:    Physician: Dr. Adams Older Worker: STEPHENIE Corbin, HIPOLITO  RN: Jatin Viveros RN  Physical Therapist: Barney Beaver PT  Occupational Therapist: ELDA Morales  Speech Therapist: Brody Ovalle SLP     Electronically signed by STEPHENIE Corbin LSW on 7/18/2022 at 3:53 PM

## 2022-07-18 NOTE — PROGRESS NOTES
Nephrology Progress Note    Assessment:  ESRDX  Non functioning dialysis catheter  OHDx  DM type-2  Hx CVA  Schizophrenia      Plan:Dr Demetria Barba to replace dialysis catheter today  dialysis than discharge    Patient Active Problem List:     Atherosclerotic heart disease of native coronary artery with unspecified angina pectoris (HCC)     Schizophrenia, paranoid, chronic     Metabolic syndrome     Vitamin B 12 deficiency     Cerebral microvascular disease     Mixed hyperlipidemia     Other hammer toe (acquired)     Vitamin D insufficiency     Incontinence of urine     Diabetic nephropathy with proteinuria ([address]     Essential (primary) hypertension     History of type C viral hepatitis     Urinary incontinence due to cognitive impairment     History of seizures     Stented coronary artery-plan is to stay on Plavix indefinately per Dr Sarai Aranda     Other specified diabetes mellitus with diabetic neuropathy, unspecified ([address]     Controlled type 2 diabetes mellitus with diabetic neuropathy, with long-term current use of insulin (HCC)     Hemiparesis, left (HCC)     Angina, class II ([address]     Pain, unspecified     Tardive dyskinesia     Shortness of breath     Uncontrolled type 2 diabetes mellitus with hyperglycemia (HCC)     Chronic diastolic congestive heart failure (HCC)     Sleep apnea, unspecified     Pulmonary hypertension, unspecified (HCC)     Class 2 severe obesity with serious comorbidity and body mass index (BMI) of 36.0 to 36.9 in adult [facility])     Edema     Closed supracondylar fracture of right humerus     Other chronic pain     Palliative care patient     Recurrent falls     Renal failure     Difficulty in walking     ESRD (end stage renal disease) on dialysis ([address]     Weakness     Other seizures (HCC)     Moderate persistent asthma without complication     [id number]     Post PTCA     Falls frequently     Chest wall contusion, left, initial encounter     Headache, unspecified     Paranoid schizophrenia ([address] Compression fracture of spine ([facility])     Closed rib fracture     Depression     Chronic obstructive pulmonary disease (HCC)     Critical illness polyneuropathy ([facility])     Multiple closed fractures of ribs of right side     Nonrheumatic mitral valve regurgitation     Nonrheumatic tricuspid valve regurgitation     Need for extended care facility     Chronic pain of both shoulders     Hemodialysis-associated hypotension     Anginal chest pain at rest [facility])     Chest pain     Unstable angina ([facility])     NSTEMI (non-ST elevated myocardial infarction) ([facility])     CKD (chronic kidney disease) stage 4, GFR 15-29 ml/min ([facility])     Hyperkalemia     Impaired mobility and activities of daily living dt polyneuropathy and reccent fall      Dialysis patient [facility])     Unspecified open wound of right upper arm, initial encounter     Multiple closed fractures of right lower extremity and ribs     Closed T11 fracture ([facility])     Encephalopathy acute     MRSA bacteremia     Sepsis due to Enterococcus ([address]     Local infection due to central venous catheter     DJD (degenerative joint disease), cervical     Closed head injury     Sarcopenia     Fall from standing     Septicemia ([address]     Chronic hepatitis C ([address]     Catheter-related bloodstream infection     Enterococcus faecalis infection     Cervical stenosis of spinal canal     Ataxic gait     Lumbar stenosis with neurogenic claudication     Falls infrequently      Subjective:  Admit Date: 7/9/2022    Interval History: no issues    Medications:  Scheduled Meds:   insulin glargine  25 Units SubCUTAneous Nightly    furosemide  40 mg Oral Daily    traMADol  25 mg Oral 3 times per day    acetaminophen  500 mg Oral 3 times per day    Vitamin D  2,000 Units Oral Dinner    cyanocobalamin  1,000 mcg IntraMUSCular Weekly    coenzyme Q10  100 mg Oral Daily    ARIPiprazole  5 mg Oral Daily    aspirin EC  81 mg Oral Daily    atorvastatin  40 mg Oral Nightly    insulin lispro  0-12 Units SubCUTAneous TID WC    insulin lispro  0-6 Units SubCUTAneous Nightly    isosorbide mononitrate  120 mg Oral Daily    lidocaine  3 patch TransDERmal Daily    magnesium oxide  400 mg Oral Daily    melatonin  10 mg Oral Nightly    midodrine  5 mg Oral Once per day on Mon Wed Fri    polyethylene glycol  17 g Oral Daily    sertraline  50 mg Oral Nightly    vancomycin (VANCOCIN) intermittent dosing (placeholder)   Other RX Placeholder     Continuous Infusions:   dextrose         CBC: No results for input(s): WBC, HGB, PLT in the last 72 hours. CMP:    Recent Labs     07/16/22  1756   *   K 4.6   CL 94*   CO2 23   BUN 39*   CREATININE 3.75*   GLUCOSE 166*   CALCIUM 9.6   LABGLOM 12.1*     Troponin: No results for input(s): TROPONINI in the last 72 hours. BNP: No results for input(s): BNP in the last 72 hours. INR: No results for input(s): INR in the last 72 hours. Lipids: No results for input(s): CHOL, LDLDIRECT, TRIG, HDL, AMYLASE, LIPASE in the last 72 hours. Liver: No results for input(s): AST, ALT, ALKPHOS, PROT, LABALBU, BILITOT in the last 72 hours. Invalid input(s): BILDIR  Iron:  No results for input(s): IRONS, FERRITIN in the last 72 hours. Invalid input(s): LABIRONS  Urinalysis: No results for input(s): UA in the last 72 hours.     Objective:  Vitals: /60   Pulse 75   Temp 98.4 °F (36.9 °C)   Resp 16   Ht 5' 7\" (1.702 m)   Wt 193 lb 12.6 oz (87.9 kg)   LMP  (LMP Unknown)   SpO2 98%   BMI 30.35 kg/m²    Wt Readings from Last 3 Encounters:   07/16/22 193 lb 12.6 oz (87.9 kg)   07/08/22 189 lb 9.5 oz (86 kg)   06/22/22 217 lb (98.4 kg)      24HR INTAKE/OUTPUT:  No intake or output data in the 24 hours ending 07/18/22 1337    General: alert, in no apparent distress  HEENT: normocephalic, atraumatic, anicteric  Neck: supple, no mass  Lungs: non-labored respirations, clear to auscultation bilaterally  Heart: regular rate and rhythm, no murmurs or rubs  Abdomen: soft, non-tender, non-distended  Ext: no cyanosis, no peripheral edema  Neuro: alert and oriented, no gross abnormalities  Psych: normal mood and affect  Skin: no rash      Electronically signed by Valeriy Dowd DO, MD

## 2022-07-18 NOTE — BRIEF OP NOTE
Preliminary  Procedure Note, Full Note To Follow in PACS  Vascular and Interventional Radiology      Wicho Bowman  1960  [id number]    Date of Procedure: 07/18/22    Physician: Chente Lombardo MD, DABR    Pre-Op Diagnosis: Non functioning left chest tunneled HD CVC    Post-Op Diagnosis: Same       Procedure: Venogram through existing CVC demonstrates a fibrin sheath formed at the tip of the catheter. Exchange of the CVC with a longer catheter now placed in right atrium. Estimated Blood Loss (mL): Minimal    Complications: None    Specimens: none    Implants: Tunneled left IJ CVC    Drains: none    Findings: Venogram through existing CVC demonstrates a fibrin sheath formed at the tip of the catheter. Exchange of the CVC with a longer catheter now placed in right atrium.      Electronically signed by Miko Neely MD on 7/18/2022 at 4:38 PM

## 2022-07-18 NOTE — CARE COORDINATION
LSW spoke to dtr Angelina and updated her regarding discharge date moving to 7/20, dtr in agreement. LSW called [facility] and notified BOD[facility] of discharge being moved to 7/20, LCT will resume transport on 7/21. LSW also called [facility] in [place] and notified them as well, and was told that they will need a note stating that it is okay for them to use the new access and placement was verified for dialysis. LSW will notify nursing.  Electronically signed by STEPHENIE Staley LSW on 7/18/2022 at 5:22 PM

## 2022-07-18 NOTE — PROGRESS NOTES
[facility] Daily Progress Note  Name: Richard Cox  Age: 57 y.o. Gender: female  CodeStatus: Full Code  Allergies: Codeine  Oxycontin [Oxycodone Hcl]    Chief Complaint:No chief complaint on file. Primary Care Provider: Josh White MD  InpatientTreatment Team: Treatment Team: Attending Provider: Diane Perez DO; Consulting Physician: Matty Briseno MD; Consulting Physician: Carroll Vang DO; Consulting Physician: Alexis Schulz MD; Consulting Physician: Eddie Lynch MD; Consulting Physician: Charbel Mccray MD; Nursing Student: Miles Haji; Consulting Physician: Michelle Smith MD; Registered Nurse: Micael Bernheim, RN; Occupational Therapist: Samuel Hooker OT; Occupational Therapist: Ana Cristina Dillon OTR/L; Occupational Therapist Assistant: MARY Reyes; : STEPHENIE Brice, AFUAW  Admission Date: 7/9/2022      HPI   Pt seen and examined on rehab unit for gait ataxia with falls in the setting of severe cervical canal stenosis. Patient currently alert and oriented x3, no acute distress, cooperative. Reports overall she feels she is getting a little stronger with therapies. Afebrile. Patient was post be discharged yesterday but discharge was delayed due to clotted hemodialysis catheter. Lower extremity weakness improving. Ambulating with Rollator. Vitals:    07/18/22 0610   BP: 137/60   Pulse: 75   Resp: 16   Temp: 98.4 °F (36.9 °C)   SpO2: 98%      Review of Systems   Constitutional:  Negative for appetite change and fever. HENT:  Negative for hearing loss and trouble swallowing. Eyes:  Negative for visual disturbance. Respiratory:  Negative for cough, chest tightness, shortness of breath and wheezing. Cardiovascular:  Negative for chest pain, palpitations and leg swelling. Gastrointestinal:  Negative for abdominal distention, nausea and vomiting. Genitourinary:  Negative for difficulty urinating.    Musculoskeletal:  Positive for gait problem. Skin:  Negative for color change and rash. Neurological:  Positive for weakness. Negative for dizziness, tremors, seizures, syncope, facial asymmetry, speech difficulty, light-headedness, numbness and headaches. Psychiatric/Behavioral:  Negative for agitation, confusion and hallucinations. The patient is not nervous/anxious. Physical Exam  Vitals and nursing note reviewed. Constitutional:       General: She is not in acute distress. Appearance: She is not diaphoretic. HENT:      Head: Normocephalic. Eyes:      Pupils: Pupils are equal, round, and reactive to light. Cardiovascular:      Rate and Rhythm: Normal rate and regular rhythm. Pulmonary:      Effort: Pulmonary effort is normal. No respiratory distress. Breath sounds: Normal breath sounds. Abdominal:      General: Bowel sounds are normal. There is no distension. Palpations: Abdomen is soft. Tenderness: There is no abdominal tenderness. Skin:     General: Skin is warm and dry. Neurological:      Mental Status: She is alert and oriented to person, place, and time. Cranial Nerves: No cranial nerve deficit. Motor: Weakness present. No tremor, atrophy, abnormal muscle tone, seizure activity or pronator drift. Coordination: Coordination normal. Finger-Nose-Finger Test normal.      Gait: Gait abnormal.      Deep Tendon Reflexes: Reflexes abnormal.      Reflex Scores:       Patellar reflexes are 0 on the right side and 0 on the left side. Achilles reflexes are 0 on the right side and 0 on the left side.         Medications:  Reviewed    Infusion Medications:    dextrose       Scheduled Medications:    insulin glargine  25 Units SubCUTAneous Nightly    furosemide  40 mg Oral Daily    traMADol  25 mg Oral 3 times per day    acetaminophen  500 mg Oral 3 times per day    Vitamin D  2,000 Units Oral Dinner    cyanocobalamin  1,000 mcg IntraMUSCular Weekly    coenzyme Q10  100 mg Oral Daily ARIPiprazole  5 mg Oral Daily    aspirin EC  81 mg Oral Daily    atorvastatin  40 mg Oral Nightly    insulin lispro  0-12 Units SubCUTAneous TID WC    insulin lispro  0-6 Units SubCUTAneous Nightly    isosorbide mononitrate  120 mg Oral Daily    lidocaine  3 patch TransDERmal Daily    magnesium oxide  400 mg Oral Daily    melatonin  10 mg Oral Nightly    midodrine  5 mg Oral Once per day on Mon Wed Fri    polyethylene glycol  17 g Oral Daily    sertraline  50 mg Oral Nightly    vancomycin (VANCOCIN) intermittent dosing (placeholder)   Other RX Placeholder     PRN Meds: miconazole, heparin (porcine), trimethobenzamide, melatonin, acetaminophen **OR** acetaminophen, albuterol, camphor-menthol-methyl salicylate, dextrose, dextrose bolus **OR** dextrose bolus, glucagon (rDNA), glucose, hydrOXYzine HCl, polyethylene glycol    Labs:   No results for input(s): WBC, HGB, HCT, PLT in the last 72 hours. Recent Labs     07/16/22  1756   *   K 4.6   CL 94*   CO2 23   BUN 39*   CREATININE 3.75*   CALCIUM 9.6       No results for input(s): AST, ALT, BILIDIR, BILITOT, ALKPHOS in the last 72 hours. No results for input(s): INR in the last 72 hours. No results for input(s): Augustina Height in the last 72 hours. Urinalysis:   Lab Results   Component Value Date/Time    NITRU Negative 07/10/2022 04:51 AM    WBCUA 0-2 07/10/2022 04:51 AM    BACTERIA Negative 07/10/2022 04:51 AM    RBCUA 3-5 07/10/2022 04:51 AM    BLOODU Negative 07/10/2022 04:51 AM    SPECGRAV 1.009 07/10/2022 04:51 AM    GLUCOSEU 100 07/10/2022 04:51 AM       Radiology:   Most recent    EEG No valid procedures specified. MRI of Brain No results found for this or any previous visit. Results for orders placed during the hospital encounter of 06/30/22    MRI BRAIN WO CONTRAST    Narrative  EXAM: MRI of the brain without contrast    History: Stroke. Weakness.     Technique: Multiplanar multisequence MRI of the brain was performed without contrast.    Comparison: MRI of the brain October 9, 2017. CT of the brain June 30, 2022    Findings:    Foci of white matter signal abnormality within the supratentorial white matter are nonspecific but most compatible with chronic small vessel ischemic changes in a patient of this age. Prominence of the sulci and ventricles compatible with mild generalized parenchymal volume loss. No acute hemorrhage, mass, mass effect, midline shift, or abnormal extra-axial fluid collection. Midline structures are within normal limits. The posterior  fossa is within normal limits. There is no diffusion restriction. No susceptibility artifact is identified on the gradient echo sequence. The major intracranial vascular flow voids are maintained. Cranial nerves 7/8 complexes appear grossly unremarkable. Mild paranasal sinus mucosal thickening. Fluid is present within the bilateral mastoid air cells compatible with nonspecific mastoid air  cell effusions. Impression  No acute ischemia or acute intracranial process. Generalized parenchymal volume loss and nonspecific white matter findings most compatible with chronic small vessel ischemic changes in a patient of this age. MRA of the Head and Neck: No results found for this or any previous visit. No results found for this or any previous visit. No results found for this or any previous visit. CT of the Head: Results for orders placed during the hospital encounter of 06/30/22    CT HEAD WO CONTRAST    Narrative  CT HEAD WO CONTRAST : 6/30/2022    CLINICAL HISTORY:  fall from standing last night confused at dialysis c/o back pain . COMPARISON: 10/11/2021. TECHNIQUE: Spiral unenhanced images were obtained of the head, with routine multiplanar reconstructions performed.     All CT scans at this facility use dose modulation, iterative reconstruction, and/or weight based dosing when appropriate to reduce radiation dose to as low as reasonably achievable. FINDINGS:    There is no intracranial hemorrhage, mass effect, midline shift, extra-axial collection, evidence of hydrocephalus, recent ischemic infarct, or skull fracture identified. Chronic volume loss and mild mucosal thickening is again noted within the maxillary sinuses. The mastoid air cells and other visualized paranasal sinuses are essentially clear. Impression  NO ACUTE INTRACRANIAL PROCESS OR SIGNIFICANT CHANGE FROM 10/11/2021 IDENTIFIED. No results found for this or any previous visit. No results found for this or any previous visit. Carotid duplex: No results found for this or any previous visit. No results found for this or any previous visit. Results for orders placed during the hospital encounter of 12/01/21    US CAROTID ARTERY BILATERAL    Narrative  EXAMINATION:  CAROTID DUPLEX ULTRASONOGRAPHY    CLINICAL HISTORY:  DIZZINESS AND CAROTID STENOSIS    COMPARISONS:  October 9, 2017    TECHNIQUE:  B-mode, color flow and spectral Doppler    FINDINGS:    ARTERIAL BLOOD FLOW VELOCITY    RIGHT PS    Prox CCA    88 cm/s  Mid CCA     71 cm/s  Dist CCA    71 cm/s  Prox ICA    63 cm/s  Mid ICA     126 cm/s  Dist ICA    116 cm/s  Prox ECA    93 cm/s  Prox VERT   41 cm/s    ICA/CCA     1.78    LEFT PS    Prox CCA    183 cm/s  Mid CCA     96 cm/s  Dist CCA    82 cm/s  Prox ICA    129 cm/s  Mid ICA     110 cm/s  Dist ICA    84 cm/s  Prox ECA    90 cm/s  Prox VERT   58 cm/s    ICA/CCA     1.42    Impression  MILD SCATTERED ATHEROSCLEROSIS BOTH CAROTID TREES. AREAS OF INTIMAL THICKENING. NO FLOW OBSTRUCTION NOTED. BY VELOCITIES BILATERAL ICAS 50-69% STENOSIS. BILATERAL ANTEGRADE VERTEBRAL FLOW. Echo No results found for this or any previous visit. Assessment/Plan:  7/11/22:  Gait ataxia with falls  MRI of the brain negative for acute findings  MRI of the lumbar spine did not show significant canal stenosis. DDD noted.   MRI of the cervical spine shows severe canal stenosis from C4-5 through C6-7. Neurosurgery consulted and recommending conservative treatment at this time. Continue with therapies and we will continue to follow her clinical course    7/13/22:   Gait ataxia with falls with noted severe canal stenosis from C4-5 through C6-7. Patient with bacteremia from infected dialysis catheter currently being treated with vancomycin. Infectious disease following. Dialysis catheter was changed. Neurosurgery recommending conservative treatment at this time for patient's cervical canal stenosis given her underlying medical issues. We will continue with therapies. Plans for discharge home on 7/17/2022 preliminarily. 7/15/2022:  Gait ataxia with falls with noted severe canal stenosis from C4-5 through C6-7  Patient with bacteremia secondary to infected dialysis catheter which has been changed. Vancomycin is now completed. Neurosurgery to follow-up with patient on outpatient basis for consideration of surgical intervention once medically stable. End-stage renal disease on dialysis. Electrolyte abnormalities noted yesterday with hyponatremia and hyperkalemia. Nephrology managing. No encephalopathy noted. Continue with therapies. Patient plans to discharge home this weekend. I have personally performed a face to face diagnostic evaluation on this patient, reviewed all data and investigations, and am the sole provider of all clinical decisions on the neurological status of this patient. Patient's ambulation is improved to some degree. She is not as dizzy. She is not a surgical candidate and neurosurgery has been following her. We have nothing much to offer her and patient will discharge from neurological standpoint. 60% time spent on evaluating and managing this patient. We did discuss with the patient regarding her condition that this is going to be chronic and safety issues have been discussed with the patient.   In the event that she worsen she should touch base with neurosurgery. 7/18/22:  Discharge was delayed due to clotted hemodialysis catheter. Patient to be seen by interventional radiology. From our perspective it is okay to discharge once medically stable. She will follow-up with neurosurgery outpatient and follow-up in our office in 8 weeks. Neurology to sign off. Call with questions or concerns.       Collaborating physicians: Dr Lucia Kemp    Electronically signed by LORETO Daniels CNP on 7/18/2022 at 12:56 PM

## 2022-07-19 ENCOUNTER — TELEPHONE (OUTPATIENT)
Dept: INTERVENTIONAL RADIOLOGY/VASCULAR | Age: 62
End: 2022-07-19

## 2022-07-19 LAB
GLUCOSE BLD-MCNC: 104 MG/DL (ref 70–99)
GLUCOSE BLD-MCNC: 115 MG/DL (ref 70–99)
GLUCOSE BLD-MCNC: 176 MG/DL (ref 70–99)
GLUCOSE BLD-MCNC: 294 MG/DL (ref 70–99)
PERFORMED ON: ABNORMAL

## 2022-07-19 PROCEDURE — 6360000002 HC RX W HCPCS: Performed by: INTERNAL MEDICINE

## 2022-07-19 PROCEDURE — 97530 THERAPEUTIC ACTIVITIES: CPT

## 2022-07-19 PROCEDURE — 97129 THER IVNTJ 1ST 15 MIN: CPT

## 2022-07-19 PROCEDURE — 6370000000 HC RX 637 (ALT 250 FOR IP): Performed by: INTERNAL MEDICINE

## 2022-07-19 PROCEDURE — 97116 GAIT TRAINING THERAPY: CPT

## 2022-07-19 PROCEDURE — 97112 NEUROMUSCULAR REEDUCATION: CPT

## 2022-07-19 PROCEDURE — 97535 SELF CARE MNGMENT TRAINING: CPT

## 2022-07-19 PROCEDURE — 99232 SBSQ HOSP IP/OBS MODERATE 35: CPT | Performed by: PHYSICAL MEDICINE & REHABILITATION

## 2022-07-19 PROCEDURE — 1180000000 HC REHAB R&B

## 2022-07-19 PROCEDURE — 90935 HEMODIALYSIS ONE EVALUATION: CPT

## 2022-07-19 PROCEDURE — 97110 THERAPEUTIC EXERCISES: CPT

## 2022-07-19 PROCEDURE — 97130 THER IVNTJ EA ADDL 15 MIN: CPT

## 2022-07-19 PROCEDURE — 6370000000 HC RX 637 (ALT 250 FOR IP): Performed by: PHYSICAL MEDICINE & REHABILITATION

## 2022-07-19 RX ORDER — SODIUM CHLORIDE 9 MG/ML
INJECTION, SOLUTION INTRAVENOUS
Status: DISPENSED
Start: 2022-07-19 | End: 2022-07-20

## 2022-07-19 RX ADMIN — ASPIRIN 81 MG: 81 TABLET, COATED ORAL at 08:54

## 2022-07-19 RX ADMIN — TRAMADOL HYDROCHLORIDE 25 MG: 50 TABLET, COATED ORAL at 14:11

## 2022-07-19 RX ADMIN — POLYETHYLENE GLYCOL 3350 17 G: 17 POWDER, FOR SOLUTION ORAL at 20:42

## 2022-07-19 RX ADMIN — ATORVASTATIN CALCIUM 40 MG: 40 TABLET, FILM COATED ORAL at 20:36

## 2022-07-19 RX ADMIN — ISOSORBIDE MONONITRATE 120 MG: 60 TABLET, EXTENDED RELEASE ORAL at 08:54

## 2022-07-19 RX ADMIN — FUROSEMIDE 40 MG: 40 TABLET ORAL at 08:54

## 2022-07-19 RX ADMIN — ACETAMINOPHEN 500 MG: 500 TABLET ORAL at 14:13

## 2022-07-19 RX ADMIN — TRAMADOL HYDROCHLORIDE 25 MG: 50 TABLET, COATED ORAL at 06:30

## 2022-07-19 RX ADMIN — INSULIN LISPRO 3 UNITS: 100 INJECTION, SOLUTION INTRAVENOUS; SUBCUTANEOUS at 20:38

## 2022-07-19 RX ADMIN — ARIPIPRAZOLE 5 MG: 5 TABLET ORAL at 08:54

## 2022-07-19 RX ADMIN — INSULIN GLARGINE 25 UNITS: 100 INJECTION, SOLUTION SUBCUTANEOUS at 20:42

## 2022-07-19 RX ADMIN — Medication 10 MG: at 20:36

## 2022-07-19 RX ADMIN — SERTRALINE 50 MG: 25 TABLET, FILM COATED ORAL at 20:36

## 2022-07-19 RX ADMIN — Medication 400 MG: at 08:54

## 2022-07-19 RX ADMIN — ACETAMINOPHEN 500 MG: 500 TABLET ORAL at 20:36

## 2022-07-19 RX ADMIN — TRAMADOL HYDROCHLORIDE 25 MG: 50 TABLET, COATED ORAL at 20:36

## 2022-07-19 RX ADMIN — Medication 100 MG: at 08:54

## 2022-07-19 RX ADMIN — ACETAMINOPHEN 500 MG: 500 TABLET ORAL at 06:30

## 2022-07-19 RX ADMIN — HEPARIN SODIUM 4600 UNITS: 1000 INJECTION INTRAVENOUS; SUBCUTANEOUS at 18:10

## 2022-07-19 ASSESSMENT — PAIN SCALES - GENERAL
PAINLEVEL_OUTOF10: 6
PAINLEVEL_OUTOF10: 0
PAINLEVEL_OUTOF10: 4

## 2022-07-19 NOTE — PROGRESS NOTES
[facility]  Facility/Department: [facility]  Speech Language Pathology   Treatment Note  Hershal Seats  1960  [id number]  [x]   confirmed    Date: 2022    Rehab Diagnosis:        Restrictions/Precautions: Fall Risk  Position Activity Restriction  Other position/activity restrictions: R Rib fractures - 10 and 11 per patient, dialysis port L chest, No Showering    Weight: 193 lb 12.6 oz (87.9 kg)   ADULT DIET; Regular; 3 carb choices (45 gm/meal); Low Sodium (2 gm); Low Potassium (Less than 3000 mg/day); 1800 ml  SpO2: 100 % (22 0624)  No active isolations    Speech Dx: Cognitive Linguistic Impairment    Subjective:  Alert and Cooperative      Treatment session ended early as pt request SLP to leave room secondary to being \"tired\". Interventions used this date:  Cognitive Skill Development and Instruction in Compensatory Strategies    Objective/Assessment:  Patient progressing towards goals:  Short-term Goals for Cognition:  Goal 1: To increase safety awareness and judgment for safe completion of ADLs secondary to pt's cognitive deficits,  pt will complete high level problem solving tasks related to ADLs with 80% accuracy and min cues. Not addressed    Goal 2: To increase safety awareness and judgment for safe completion of ADLs secondary to pt's cognitive deficits, pt will complete abstract reasoning tasks (i.e. Word deduction, convergent and divergent naming, similarities/differences) with 80% accuracy and min cues. 83% acc with abstract divergent naming    Goal 3: To address pt's cognitive deficits and promote her ability to safely follow directives in a variety of environments, pt will carry out verbal directives of increasing complexity in everyday activities with 80% accuracy and min cues. Not addressed. Goal 4:  To promote awareness and functionality of compensatory strategies in light of pt's cognitive deficits, pt will recall 2 memory strategies with min cues and utilize Minutes  Time In: 0900  Time Out: 5202  Minutes: 25        Signature: Electronically signed by ANN MARIE Gonzalez on 7/19/2022 at 12:33 PM

## 2022-07-19 NOTE — PLAN OF CARE
Problem: Discharge Planning  Goal: Discharge to home or other facility with appropriate resources  Outcome: Progressing Towards Goal     Problem: Safety - Adult  Goal: Free from fall injury  Outcome: Progressing Towards Goal  Flowsheets (Taken 7/19/2022 1357)  Free From Fall Injury: Based on caregiver fall risk screen, instruct family/caregiver to ask for assistance with transferring infant if caregiver noted to have fall risk factors     Problem: Skin/Tissue Integrity  Goal: Absence of new skin breakdown  Description: 1. Monitor for areas of redness and/or skin breakdown  2. Assess vascular access sites hourly  3. Every 4-6 hours minimum:  Change oxygen saturation probe site  4. Every 4-6 hours:  If on nasal continuous positive airway pressure, respiratory therapy assess nares and determine need for appliance change or resting period.   Outcome: Progressing Towards Goal     Problem: ABCDS Injury Assessment  Goal: Absence of physical injury  Outcome: Progressing Towards Goal  Flowsheets (Taken 7/19/2022 1357)  Absence of Physical Injury: Implement safety measures based on patient assessment     Problem: Nutrition Deficit:  Goal: Optimize nutritional status  Outcome: Progressing Towards Goal     Problem: Chronic Conditions and Co-morbidities  Goal: Patient's chronic conditions and co-morbidity symptoms are monitored and maintained or improved  Outcome: Progressing Towards Goal

## 2022-07-19 NOTE — PROGRESS NOTES
Nephrology Progress Note    Assessment:  ESRDX  Hx CVA  DM type-2  Hx Seizures  Hypertension  Anemia        Plan: dialysis today and discharge     Patient Active Problem List:     Atherosclerotic heart disease of native coronary artery with unspecified angina pectoris (HCC)     Schizophrenia, paranoid, chronic     Metabolic syndrome     Vitamin B 12 deficiency     Cerebral microvascular disease     Mixed hyperlipidemia     Other hammer toe (acquired)     Vitamin D insufficiency     Incontinence of urine     Diabetic nephropathy with proteinuria ([address]     Essential (primary) hypertension     History of type C viral hepatitis     Urinary incontinence due to cognitive impairment     History of seizures     Stented coronary artery-plan is to stay on Plavix indefinately per Dr Jeff Blackburn     Other specified diabetes mellitus with diabetic neuropathy, unspecified ([address]     Controlled type 2 diabetes mellitus with diabetic neuropathy, with long-term current use of insulin (HCC)     Hemiparesis, left (HCC)     Angina, class II ([address]     Pain, unspecified     Tardive dyskinesia     Shortness of breath     Uncontrolled type 2 diabetes mellitus with hyperglycemia (HCC)     Chronic diastolic congestive heart failure (HCC)     Sleep apnea, unspecified     Pulmonary hypertension, unspecified (HCC)     Class 2 severe obesity with serious comorbidity and body mass index (BMI) of 36.0 to 36.9 in adult [facility])     Edema     Closed supracondylar fracture of right humerus     Other chronic pain     Palliative care patient     Recurrent falls     Renal failure     Difficulty in walking     ESRD (end stage renal disease) on dialysis ([address]     Weakness     Other seizures (HCC)     Moderate persistent asthma without complication     [id number]     Post PTCA     Falls frequently     Chest wall contusion, left, initial encounter     Headache, unspecified     Paranoid schizophrenia ([address]     Compression fracture of spine ([address]     Closed rib fracture     Depression     Chronic obstructive pulmonary disease (HCC)     Critical illness polyneuropathy ([facility])     Multiple closed fractures of ribs of right side     Nonrheumatic mitral valve regurgitation     Nonrheumatic tricuspid valve regurgitation     Need for extended care facility     Chronic pain of both shoulders     Hemodialysis-associated hypotension     Anginal chest pain at rest [facility])     Chest pain     Unstable angina ([facility])     NSTEMI (non-ST elevated myocardial infarction) ([facility])     CKD (chronic kidney disease) stage 4, GFR 15-29 ml/min ([facility])     Hyperkalemia     Impaired mobility and activities of daily living dt polyneuropathy and reccent fall      Dialysis patient [facility])     Unspecified open wound of right upper arm, initial encounter     Multiple closed fractures of right lower extremity and ribs     Closed T11 fracture ([facility])     Encephalopathy acute     MRSA bacteremia     Sepsis due to Enterococcus ([address]     Local infection due to central venous catheter     DJD (degenerative joint disease), cervical     Closed head injury     Sarcopenia     Fall from standing     Septicemia ([address]     Chronic hepatitis C ([address]     Catheter-related bloodstream infection     Enterococcus faecalis infection     Cervical stenosis of spinal canal     Ataxic gait     Lumbar stenosis with neurogenic claudication     Falls infrequently      Subjective:  Admit Date: 7/9/2022    Interval History: no issues    Medications:  Scheduled Meds:   insulin glargine  25 Units SubCUTAneous Nightly    furosemide  40 mg Oral Daily    traMADol  25 mg Oral 3 times per day    acetaminophen  500 mg Oral 3 times per day    Vitamin D  2,000 Units Oral Dinner    cyanocobalamin  1,000 mcg IntraMUSCular Weekly    coenzyme Q10  100 mg Oral Daily    ARIPiprazole  5 mg Oral Daily    aspirin EC  81 mg Oral Daily    atorvastatin  40 mg Oral Nightly    insulin lispro  0-12 Units SubCUTAneous TID WC    insulin lispro  0-6 Units SubCUTAneous rubs  Abdomen: soft, non-tender, non-distended  Ext: no cyanosis, no peripheral edema  Neuro: alert and oriented, no gross abnormalities  Psych: normal mood and affect  Skin: no rash      Electronically signed by Usama Willis DO, MD

## 2022-07-19 NOTE — PROGRESS NOTES
Physical Therapy Rehab Treatment Note  Facility/Department: [facility]  Room: R3/X537-80       NAME: Gordon Hoffman  : 1960 (43 y.o.)  MRN: [id number]  CODE STATUS: Full Code    Date of Service: 2022       Restrictions:  Restrictions/Precautions: Fall Risk  Position Activity Restriction  Other position/activity restrictions: L Rib fractures - 10 and 11 per patient       SUBJECTIVE:   Subjective: \" I am so tired\"    Pain  Pain: denies pain at the moment. OBJECTIVE:      Outcomes Measures:  [place] Balance Score: 45     Roll Left  Assistance Level: Independent  Roll Right  Assistance Level: Independent  Sit to Supine  Assistance Level: Independent  Supine to Sit  Assistance Level: Independent  Scooting  Assistance Level: Independent    Sit to Stand  Assistance Level: Independent  Stand to Sit  Assistance Level: Independent  Bed To/From Chair  Assistance Level: Independent    Ambulation  Surface: Carpet; Level surface; Uneven surface  Device: Rollator  Distance: 50'  Activity: Within Unit  Activity Comments: Reciprocal pattern good safety  Assistance Level: Independent    PT Exercises  Exercise Treatment: Supine Exercises: Quad sets, Glute sets, Ankle pumps, Hip abduction, Straight leg raise, Bridging x 15 Seated Long arc quads, Hip flexion, Ball squeezes, Side kicks x 15     Activity Tolerance  Activity Tolerance: Patient tolerated evaluation without incident    Education Provided: Home Exercise Program  Education  Education Given To: Patient  Education Provided: Home Exercise Program  Education Provided Comments: Seated and supine HEP goven and reviewed with handout provided. Patient completes with [place]  Education Method: Printed Information/Hand-outs  Education Outcome: Verbalized understanding;Demonstrated understanding      ASSESSMENT/PROGRESS TOWARDS GOALS:   Body Structures, Functions, Activity Limitations Requiring Skilled Therapeutic Intervention: Decreased functional mobility ; Decreased ADL status; Decreased ROM; Decreased balance;Decreased strength;Decreased posture; Increased pain  Assessment: Patiet unable meet HANSON balance score completing at 45/56. Patient continues to show good safety with gait and transfers and completes seated and supine HEP with [place]    Goals:  Long Term Goals  Long term goal 1: Indep bed mobility  Long term goal 2: Indep transfers bed to chair with safest assistive device  Long term goal 3: Ambulate with rollator Indep on level and ramp surfaces >/= 50' to allow safe return home. Long term goal 4: Pt will demonstrate improved score on Hanson balance assessment 48/56 for decreased risk for falls. PLAN OF CARE/Safety:   Safety Devices  Type of Devices: All fall risk precautions in place; Bed alarm in place;Call light within reach      Therapy Time:   Individual   Time In 1300   Time Out 1400   Minutes 60     Minutes: 60  Transfer/Bed mobility training: 10  Gait training: 10  Neuro re education: 20  Therapeutic ex: Jermain Fox, DAREK, 07/19/22 at 3:26 PM

## 2022-07-19 NOTE — PROGRESS NOTES
Pt voiced concern about Thursdays HD and Vancomycin administration as it is based on her blood results. HD technician updated pt of scheduled blood draw for tomorrow. I then had a phone conversation w/Hannah HUDDLESTON w/case management/admission coordinator about pt HD center being updated of Vancomycin administration and blood results to [place] of and requested a follow up with pt HD center to update on level and medication administration after HD.  Electronically signed by Halina Milian RN on 7/19/2022 at 3:56 PM

## 2022-07-19 NOTE — PROGRESS NOTES
Per perfect served provider Dr. Richmond Rabago about medication reconciliation for her upcoming D/C. Provider called back and signed off of pt case and reconciled medications.  Electronically signed by Shalom Bhakta RN on 7/19/2022 at 11:31 AM

## 2022-07-19 NOTE — PROGRESS NOTES
-  Increase Glipizide 5 mg to one tablet with breakfast and one with dinner  - Increase Levothyroxine to 112 mcg daily  - Continue diet and lifestyle modifications   - Bring BG log and meter to next visit   - Will recheck A1c in 3 months   - Follow up in 3 months    Limit the amount of sugar sweetened drinks like soda pop and juice. , For Blood Glucose < 80 mg/dl treat with 4 ounces of juice or 4 glucose tablets (15g). Recheck Blood Glucose in 15 minutes. Repeat until blood glucose is > 80. , Call if sugars consistently less than 70 or greater than 300.   and Bring your sugar log and meter to each visit   [facility] Occupational Therapy      Date: 2022  Patient Name: Mariposa Castro        MRN: [id number]  Account: [de-identified]   : 1960  (57 y.o.)  Room: [place] [address]    Chart reviewed, attempted OT at 0725 for get up + go program. Patient not seen 2° to:    Pt. declined, stating: she was comfortable and wanting to eat breakfast in bed.   Pt declined other needs     Will attempt again when able    Electronically signed by Frank Barraza OT on 2022 at 8:00 AM

## 2022-07-19 NOTE — PLAN OF CARE
Problem: Safety - Adult  Goal: Free from fall injury  Outcome: Progressing Towards Goal     Problem: Skin/Tissue Integrity  Goal: Absence of new skin breakdown  Description: 1. Monitor for areas of redness and/or skin breakdown  2. Assess vascular access sites hourly  3. Every 4-6 hours minimum:  Change oxygen saturation probe site  4. Every 4-6 hours:  If on nasal continuous positive airway pressure, respiratory therapy assess nares and determine need for appliance change or resting period.   Outcome: Progressing Towards Goal

## 2022-07-19 NOTE — PROGRESS NOTES
HD today for 3 hours via Left chest HD cath. BFR  325 due to High AP alarms. Pt had symptomatic low BP tx with Normal Saline 200 ml and UF adjustment. Net UF 1150 ml. Per Pharmacist, ED, Pt will not received vanco today due to high level. Vanco level to be drawn Wed. am. Vanco dose to ve determined on Wed. level and will be administered during HD on Thursday. Sawyer HUDDLESTON and  aware. and  All HD charting via [facility] paper chart. Hands off to [facility].

## 2022-07-19 NOTE — PROGRESS NOTES
OCCUPATIONAL THERAPY  INPATIENT REHAB TREATMENT NOTE  [facility]      NAME: Fely Dukes  : 1960 (82 y.o.)  MRN: [id number]  CODE STATUS: Full Code  Room: R248/R248-01    Date of Service: 2022    Referring Physician: Dr. Klarissa Cottrell  Rehab Diagnosis: impaired mobility and ADLs d/t polyneuropathy and recent fall    Restrictions  Restrictions/Precautions  Restrictions/Precautions: Fall Risk         Position Activity Restriction  Other position/activity restrictions: R Rib fractures - 10 and 11 per patient, dialysis port L chest, No Showering    Patient's date of birth confirmed: Yes    SAFETY:  Safety Devices  Safety Devices in place: Yes  Type of devices: All fall risk precautions in place    SUBJECTIVE:  Subjective: \" Can you call my daughter\" in reference to Marysol MCMILLAN  Pain: denies    Pain at start of treatment: Yes: 4/10    Pain at end of treatment: Yes: 4/10    Location: back  Nursing notified: Declined  RN:   Intervention: None    COGNITION:  Orientation  Overall Orientation Status: Within Normal Limits  Orientation Level: Oriented to place;Oriented to time;Oriented to person;Oriented to situation  Cognition  Overall Cognitive Status: WFL          OBJECTIVE:         Toileting  Assistance Level: Minimal assistance  Toilet Transfers  Technique: Stand step  Equipment: Standard toilet;Grab bars  Assistance Level: Stand by assist         Functional Mobility  Device: Wheelchair  Activity: To/From therapy gym; To/From bathroom  Assistance Level: Maximum assistance  Sit to Stand  Assistance Level: Supervision  Stand to Sit  Assistance Level: Supervision     Pt completed kitchen mobility with 4 w/w per this is what pt has a home to use. Pt states she does not go in/out of kitchen often, maybe just the fridge. Provided instruction for walker placement to complete item retrieval above head to open/close cupboard, and completed counter top walking to open/close drawers waist to knee level.  Pt Minutes 30                   ADL/IADL trainin minutes  Therapeutic activities: 10 minutes     Electronically signed by:     MARY Navas,   2022, 11:43 AM

## 2022-07-19 NOTE — CARE COORDINATION
HIPOLITO received voicemail from Liane Kincaid (RN Case Manager) stating that she was told by Dr. Jackelin Woodruff that pt could discharge home today after dialysis. AFUAW called Gerda Eller (RN) to confirm and Gerda Eller stated that pt wants to discharge today and confirmed she will pick her up. Gerda Eller gave an estimated  time of around [address] called Angelina (dtr) and notified her of the discharge date moving to today, 7/19. Angelina noted that she thought 8PM was kind of late, but confirmed she will be able to pick her up. AFUAW notified therapy departments as well. Pt to d/c today with OP therapy to resume at [facility] per pt choice. Electronically signed by STEPHENIE Davis LSW on 7/19/2022 at 9:25 AM      LSW followed up with pt and she is requesting to discharge home tomorrow on 7/20 instead due to diaylsis being later in the day. LSW notified nursing, therapy, and Dr. Lisa Alvarado. AFUAW called lynnette and updated her as well. Pt will discharge home on 7/20 with [facility] OP therapy.  Electronically signed by STEPHENIE Davis LSW on 7/19/2022 at 11:32 AM

## 2022-07-19 NOTE — PROGRESS NOTES
Physician Progress Note      LIBERTADValky Crew  CSN #:                  [id number]  :                       1960  ADMIT DATE:       2022 7:01 AM  [address] [place] DATE:        2022 6:47 PM  RESPONDING  PROVIDER #:        David Simon MD          QUERY TEXT:    Per documentation E. Faecalis BSI - repeat blood cultures and dialysis   catheter tip no growth. Highest procalcitonin 0.74, WBC 7.3 to 9.6, highest   temp 99.5, lactic acid on admit 2.5. Positive blood culture . In order to   support the diagnosis of sepsis, please include additional clinical   indicators in your documentation. Or please document if the diagnosis of   sepsis has been ruled out after further study    The medical record reflects the following:  Risk Factors: ESRD  CAD s/p CABG/PCI  IDDM2 with hyperglycemia  Clinical Indicators: WBC 7.3  5.7  6.6    6.0  5.8  7.0  T max 99.5   (PCT   0.40 on 22  0.74  on 7/3/22  CRP 26.8 on 22)   Dr Isi Carrero: E. Faecalis BSI- repeat blood cultures and dialysis catheter tip   no growth   Dr Marbin Sims: Blood cultures from admission 2 sets growing gram-positive cocci   in chains Identified as Enterococcus faecalis by PCR  Sepsis with   Enterococcus  Infected dialysis cath   DC summary per Dr Isi Carrero, principal problem MRSA bacteremia, active   problem list Sepsis due to enterococcus  Treatment: IV Vancomycin  cultures  ID  cardiology    Bernarda Wolff RN CCDS  [phone]  Options provided:  -- Sepsis present as evidenced by, Please document evidence. -- Sepsis was ruled out after study  -- Other - I will add my own diagnosis  -- Disagree - Not applicable / Not valid  -- Disagree - Clinically unable to determine / Unknown  -- Refer to Clinical Documentation Reviewer    PROVIDER RESPONSE TEXT:    Sepsis was ruled out after study.     Query created by: Cayla Marshall on 2022 8:55 AM      Electronically signed by:  David Simon MD 2022 9:11 PM

## 2022-07-19 NOTE — PROGRESS NOTES
Patient returned from dialysis with no c/o pain or discomfort voiced at this time. Pt. ate 100% of her dinner. Dressing to [facility] is clean, dry and intact.

## 2022-07-19 NOTE — PROGRESS NOTES
Physical Therapy Rehab Treatment Note  Facility/Department: [facility]  Room: L729/N993-21       NAME: Supriya Barnett  : 1960 (18 y.o.)  MRN: [id number]  CODE STATUS: Full Code    Date of Service: 2022       Restrictions:  Restrictions/Precautions: Fall Risk  Position Activity Restriction  Other position/activity restrictions: L Rib fractures - 10 and 11 per patient       SUBJECTIVE:   Subjective: \" I slept alright\"    Pain  Pain: denies pain at the moment. OBJECTIVE:     Roll Left  Assistance Level: Independent  Roll Right  Assistance Level: Independent  Sit to Supine  Assistance Level: Independent  Supine to Sit  Assistance Level: Independent  Scooting  Assistance Level: Independent    Sit to Stand  Assistance Level: Independent  Stand to Sit  Assistance Level: Independent  Bed To/From Chair  Assistance Level: Independent  Car Transfer  Assistance Level: Independent    Ambulation  Surface: Carpet; Level surface; Uneven surface  Device: Rollator  Distance: 60', 50'  Activity: Within Room  Activity Comments: Reciprocal pattern good safety  Assistance Level: Independent    Stairs  Stair Height: 6''  Device: Bilateral handrails  Number of Stairs: 8  Additional Factors: Verbal cues; Non-reciprocal going up;Non-reciprocal going down  Assistance Level: Supervision  Curb  Curb Height: 4''  Device:  (Rollator)  Number of Curbs: 2  Assistance Level: Stand by assist;Supervision  Skilled Clinical Factors: Visual and verbal cues for proper technique. Increased effort to complete    ASSESSMENT/PROGRESS TOWARDS GOALS:   Assessment  Assessment: Patient is meeting gait transfers and med mobility goals at this time. Patient reports mild fatigue at start of therapy session. Does not affect mobility  Activity Tolerance: Patient tolerated evaluation without incident  Discharge Recommendations: Home independently    Goals:  Long Term Goals  Long term goal 1: Indep bed mobility  Long term goal 2:  Indep transfers bed to chair with safest assistive device  Long term goal 3: Ambulate with rollator Indep on level and ramp surfaces >/= 50' to allow safe return home. Long term goal 4: Pt will demonstrate improved score on [place] balance assessment 48/56 for decreased risk for falls. PLAN OF CARE/Safety:   Safety Devices  Type of Devices: All fall risk precautions in place; Bed alarm in place;Call light within reach      Therapy Time:   Individual   Time In 1130   Time Out 1200   Minutes 30     Minutes: 30  Transfer/Bed mobility training:15  Gait trainin [place], PTA, 22 at 12:06 PM

## 2022-07-19 NOTE — PROGRESS NOTES
OCCUPATIONAL THERAPY  INPATIENT REHAB TREATMENT NOTE  [facility]      NAME: Gordon Hoffman  : 1960 (16 y.o.)  MRN: [id number]  CODE STATUS: Full Code  Room: [facility]/R248-01    Date of Service: 2022    Referring Physician: Dr. Geneva Templeton  Rehab Diagnosis: impaired mobility and ADLs d/t polyneuropathy and recent fall    Restrictions  Restrictions/Precautions  Restrictions/Precautions: Fall Risk         Position Activity Restriction  Other position/activity restrictions: R Rib fractures - 10 and 11 per patient, dialysis port L chest, No Showering    Patient's date of birth confirmed: Yes    SAFETY:  Safety Devices  Safety Devices in place: Yes  Type of devices:  All fall risk precautions in place    SUBJECTIVE:  Subjective: I'm supposed to leave late tonight  Pain: denies    Pain at start of treatment: No    Pain at end of treatment: Yes: 3/10    Location: back    COGNITION:     Comprehension: Independent  Expression: Independent  Social Interaction: Independent  Problem Solving: Supervision  Memory: Supervision    OBJECTIVE:  Pt presented supine in bed- pt reporting being fatigued and upset regarding discharge but was agreeable to participation with encouragement      Feeding  Assistance Level: Modified independent  Grooming/Oral Hygiene  Assistance Level: Modified independent  Upper Extremity Bathing  Assistance Level: Modified independent  Lower Extremity Bathing  Assistance Level: Minimal assistance  Skilled Clinical Factors: thoroughness with buttocks area  Upper Extremity Dressing  Assistance Level: Modified independent  Lower Extremity Dressing  Assistance Level: Stand by assist  Putting On/Taking Off Footwear  Assistance Level: Minimal assistance  Skilled Clinical Factors: pt requesting assistance today due to fatigue despite encouragement  Toileting  Skilled Clinical Factors: NT    While seated, completed fine motor task with focus on coordination, activity tolerance, and strength in order to improve general function. Challenged pt through usage of PVC pipes/connectors with pt to build 3D structures according to visual model presented on piece of paper. 2 models completed. Pt also instructed to disassemble all parts for added /digit exercise. Pt completed task with 100% accuracy    Challenged pt through usage of small dowels. Pt required to manipulate dowels, through pinching/grasping, reaching and placing according to instruction into vertical peg tree. Repetitive bilateral UE reaches completed to forward/vertical planes and at times required >90 degrees of shoulder flexion. 1 # wrist weights added to further address strength.  Pt placed pegs into tree while seated and removed in standing   Transfers: SUP for STS from w.c   Standing Balance: Fair  Standing tolerance: fair      Education:   ADL and transfer techniques, POC, AD/DME usage    ASSESSMENT:   Good participation during session     PLAN OF CARE:  Strengthening, Balance training, Functional mobility training, Endurance training, Safety education & training, Patient/Caregiver education & training, Equipment evaluation, education, & procurement, Self-Care / ADL, Home management training  continue with current POC until discharge of 22    Patient goals : work on strengthening my arms and legs, and improve balance so I don't fall     Therapy Time:   Individual Group Co-Treat   Time In 930       Time Out 1030         Minutes 60             ADL/IADL trainin minutes  Therapeutic activities: 20 minutes     Electronically signed by:    Lesvia Jarvis OT,   2022, 10:19 AM

## 2022-07-19 NOTE — PROGRESS NOTES
Spoke to Bruna, nurse on floor. This patient had a dialysis catheter exchanged yesterday 07/18/2022 by Dr. Daryn Hendricks. Per Bruna, the pressure dressing was still intact and dry. The patient went for some dialysis yesterday and is scheduled to go to dialysis again today for another session. No other complications or issues noted with either the catheter or catheter site.

## 2022-07-19 NOTE — PROGRESS NOTES
Subjective: The patient complains of  moderate to severe acute      partially relieved by rest, PT, OT, rest, pain medications and exacerbated by recent illness and exertion. Continues to have pain from her multiple rib fractures and is recovering from bilateral pleural effusions and balancing cardiac with renal risks. Nephrology is consulting and there titrating Lasix with caution. She is getting hemodialysis. I am concerned about patients medical complexities and current active problems and barriers to progress including hx of hep C and paranoid schitzophrenia. She is scheduled for another session of dialysis today her dialysis catheter is a little bit sore and when to keep an eye on it its been changed and revised several times. After that dialysis- she is scheduled for therapy and will go home tomorrow. She is requesting tramadol as an outpatient for her acute on chronic pain she has acute rib fractures and chronic pain from renal osteodystrophy. Patient's discharge was extended because her fistula and dialysis catheter were both nonfunctional and she had to have some procedure done to her dialysis catheter. We are hoping it is functional today she get dialysis today and tomorrow and then be discharged. ROS x10: The patient also complains of severely impaired mobility and activities of daily living. Otherwise no new problems with vision, hearing, nose, mouth, throat, dermal, cardiovascular, GI, , pulmonary, musculoskeletal, psychiatric or neurological. See Rehab H&P on Rehab chart dated . Vital signs:  /68   Pulse 78   Temp 98.2 °F (36.8 °C)   Resp 20   Ht 5' 7\" (1.702 m)   Wt 193 lb 12.6 oz (87.9 kg)   LMP  (LMP Unknown)   SpO2 100%   BMI 30.35 kg/m²   I/O:   PO/Intake:  fair PO intake, no problems observed or reported. Bowel/Bladder:  incontinent,Purwic constipation and urinary urgency. Last BM 7/9/22.   General:  Patient is well developed, adequately nourished, non-obese and     well kempt. HEENT:    PERRLA, hearing intact to loud voice, external inspection of ear     and nose benign. Inspection of lips, tongue and gums  No teeth  Musculoskeletal: No significant change in strength or tone. All joints stable. Inspection and palpation of digits and nails show no clubbing,       cyanosis or inflammatory conditions. Neuro/Psychiatric: Affect: flat. Alert and oriented to person, place and     situation. No significant change in deep tendon reflexes or     Sensation    Lungs:  Diminished, CTA-B. Respiration effort is normal at rest.   fractured right 10-11 th ribs. tunneled Vascath left upper    chest with 2 ports. Heart:   S1 = S2, RRR. No loud murmurs. Abdomen:  Soft, non-tender, no enlargement of liver or spleen. Extremities:  Trace lower extremity edema,    tenderness. dialysis fistula graft to right upper arm that is not in use  Skin:   Intact   - discolored and bruised . ruise to back of right shoulder and right knee. Small sores noted to arms x 3     Rehabilitation:  Physical therapy: FIMS:  Bed Mobility: Scooting: Stand by assistance    Transfers: Sit to Stand: Independent  Stand to sit: Independent  Bed to Chair: Stand by assistance, Ambulation  Surface: level tile  Device: Rollator  Assistance: Independent  Quality of Gait: wide JAKE  Gait Deviations: Slow Tere, Decreased step length, Decreased step height  Distance: 50 ft,      FIMS:  ,  , Assessment: Pt tends to bend rt knee with prolonged standing vc to strengten. Pt with increased gait speed. Occupational therapy: FIMS:   ,  , Assessment: Pt is a 58 y/o female who presents to [facility] for medical decline with fall. Pt lives with dtr who assists with ADLs and IADLs PTA. Pt presents with above deficits and impaired ADL status.  Pt may benefit from skilled OT intervention for increased indepdnence and safety upon d/c to home    Speech therapy: FIMS:        Lab/X-ray studies reviewed, analyzed and discussed with patient and staff:   Recent Results (from the past 24 hour(s))   POCT Glucose    Collection Time: 07/18/22 11:23 AM   Result Value Ref Range    POC Glucose 143 (H) 70 - 99 mg/dl    Performed on ACCU-CHEK    POCT Glucose    Collection Time: 07/18/22  8:16 PM   Result Value Ref Range    POC Glucose 103 (H) 70 - 99 mg/dl    Performed on ACCU-CHEK    POCT Glucose    Collection Time: 07/19/22  6:22 AM   Result Value Ref Range    POC Glucose 104 (H) 70 - 99 mg/dl    Performed on ACCU-CHEK        Previous extensive, complex labs, notes and diagnostics reviewed and analyzed. ALLERGIES:    Allergies as of 07/09/2022 - Fully Reviewed 06/30/2022   Allergen Reaction Noted    Codeine Hives 12/09/2011    Oxycontin [oxycodone hcl] Hives 06/15/2020      (please also verify by checking MAR)      Recently, I evaluated this patient for periodic reassessment of medical and functional status. The patient was discussed in detail at the treatment team meeting focusing on current medical issues, progress in therapies, social issues, psychological issues, barriers to progress and strategies to address these barriers, and discharge planning. See the hand written addendum to rehab progress note. The patient continues to be high risk for future disability and their medical and rehabilitation prognosis continue to be good and therefore, we will continue the patient's rehabilitation course as planned. The patient's tentative discharge date was set. Patient and family education was discussed. The patient was made aware of the team discussion regarding their progress. Discharge plans were discussed along with barriers to progress and strategies to address these barriers, patient encouraged to continue to discuss discharge plans with .          Complex Physical Medicine & Rehab Issues Assess & Plan:   Severe abnormality of gait and mobility and impaired self-care and ADL's secondary to progressive weakness dt OA flareup and  Polyneuropathy . Functional and medical status reassessed regarding patients ability to participate in therapies and patient found to be able to participate in acute intensive comprehensive inpatient rehabilitation program including PT/OT to improve balance, ambulation, ADLs, and to improve the P/AROM. Therapeutic modifications regarding activities in therapies, place, amount of time per day and intensity of therapy made daily. In bed therapies or bedside therapies prn. Bowel progressive constipation, and Bladder dysfunction monitoring neurogenic bladder,   Incontinence of urine:  frequent toileting, ambulate to bathroom with assistance, check post void residuals. Check for C.difficile x1 if >2 loose stools in 24 hours, continue bowel & bladder program.  Monitor bowel and bladder function. Lactinex 2 PO every AC. MOM prn, Brown Bomb prn, Glycerin suppository prn, enema prn. Severe chest wall pain as well as generalized OA pain, fracture T11 chronic pain of both shoulders: reassess pain every shift and prior to and after each therapy session, give prn Tylenol and Ultram, modalities prn in therapy, Lidoderm, K-pad prn. Okay for Ultram as an outpatient. Patient will likely need to get her prescriptions in the future from pain management either myself or other pain management in the area. Skin healing and breakdown risk:  continue pressure relief program.  Daily skin exams and reports from nursing. Add co-Q10  Severe fatigue due to nutritional and hydration deficiency: Add vitamin B12 vitamin D and CoQ10 continue to monitor I&Os, calorie counts prn, dietary consult prn. Add healthy HS snack. Acute episodic insomnia with situational adjustment disorder:  consider prn low dose Ambien, monitor for day time sedation. Add HS \"Tuck In\"  Falls risk elevated:  patient to use call light to get nursing assistance to get up, bed and chair alarm.   Elevated DVT risk: progressive activities in PT, continue prophylaxis MAHAMED hose, elevation and avoid Lovenox due to renal failure. Complex discharge planning:     Complete the medication simplification patient and family education as we transition from to planned pending discharge July 20, 2022 to home with her daughter who is her hired caregiver with follow-up hemodialysis friends  -Tuesday Thursday and Saturdays as well as home health care-eventually transitioning to outpatient therapy. SP weekly team meeting  every Monday to re-assess progress towards goals, discuss and address social, psychological and medical comorbidities and to address difficulties they may be having progressing in therapy. Patient and family education is in progress. The patient is to follow-up with their family physician after discharge. Complex Active General Medical Issues that complicate care Assess & Plan:     1. Principal Problem:    Impaired mobility and activities of daily living dt polyneuropathy and reccent fall   Active Problems:  Chronic CHF, CKD (chronic kidney disease) stage 4, GFR 15-29 ml/min,   ESRD (end stage renal disease) on dialysis, Patient is getting nauseated when she is due for her dialysis due to toxic meds tabulate built up. She is responding to dialysis and Tigan. We also discussed her renal osteodystrophy pain and scheduling her Ultram every 8 hours she is in agreement. Consult nephrology. Titrate Lasix-balance cardiac and renal risk    Closed T11 fracture renal osteodystrophy,   Multiple closed fractures of ribs of right side,   Chest wall contusion, left -Lidoderm, vitamin D, abdominal binder as tolerated    Encephalopathy acute,   Cerebral microvascular disease-limit toxic medications    MRSA bacteremia with Septicemia -IV vancomycin consult pharmacy for dosing monitor renal function and adjust dose.     Chronic hepatitis C-eliminate toxic medications    Vitamin B 12 deficiency, Vitamin D insufficiency-Add high-dose vitamin D, recheck vitamin D level out after discharge,  Now with low blood pressure but history of essential (primary) hypertension,   Chronic diastolic congestive heart failure-Acute rehab to monitor heart rate and rhythm with the option of telemetry and the effects of chronotropic medication with respect to increasing physical activity and exercise in PT, OT, ADLs with medication titration to lowest effective dosing. Continue blood signs every shift focusing on heart rate, rhythm and blood pressure checks with orthostatic checks-monitoring the effect of exercise, therapy and posture. Consult hospitalist for backup medical and adjust/add medications (aspirin, Imdur, Lipitor, ProAmatine). Monitor heart rate and blood pressure as well as medications effects on vital signs before during and after therapy with especial focus on preventing orthostasis and falls risk. Controlled type 2 diabetes mellitus with diabetic neuropathy, with long-term current use of insulin-Continue blood sugar checks every shift, diet, add diabetic add dietary education, restrict carbohydrates to lowest effective and safe carb count per meal advising 4 carbs per meal, add at bedtime snack to prevent a.m. hypoglycemia, adjust/add medications (Lantus, sliding scale insulin)     Pulmonary hypertension -Acute rehab for endurance traing with Pulse Ox to monitoring oxygen saturation and heart rate with O2 titration to lowest effective dose. Pulse oximeter checks to shift and at HS to dose and titrate oxygen and aerosol treatments monitor for nocturnal hypoxemia, monitor vital signs, oxygen prn. Focus on energy conservation. Paranoid schizophrenia -emotional support provided daily, vitamin B12, encourage participation in rehabilitation support group and recreational therapy, adjust/add medications -patient had been on Zoloft at home. Focus on coordinating dc plans with patient family and care givers and order necessary equipment.   Monitor new dialysis catheter, complete hemodialysis as an inpatient transitioning to home tomorrow.           Willow Sutton D.O., PM&R     Attending    [address]

## 2022-07-20 VITALS
OXYGEN SATURATION: 97 % | SYSTOLIC BLOOD PRESSURE: 128 MMHG | WEIGHT: 193.78 LBS | BODY MASS INDEX: 30.42 KG/M2 | TEMPERATURE: 98.2 F | DIASTOLIC BLOOD PRESSURE: 62 MMHG | HEIGHT: 67 IN | HEART RATE: 80 BPM | RESPIRATION RATE: 17 BRPM

## 2022-07-20 LAB
GLUCOSE BLD-MCNC: 160 MG/DL (ref 70–99)
GLUCOSE BLD-MCNC: 161 MG/DL (ref 70–99)
PERFORMED ON: ABNORMAL
PERFORMED ON: ABNORMAL
VANCOMYCIN RANDOM: 18.6 UG/ML (ref 10–40)

## 2022-07-20 PROCEDURE — 6370000000 HC RX 637 (ALT 250 FOR IP): Performed by: INTERNAL MEDICINE

## 2022-07-20 PROCEDURE — 97110 THERAPEUTIC EXERCISES: CPT

## 2022-07-20 PROCEDURE — 80202 ASSAY OF VANCOMYCIN: CPT

## 2022-07-20 PROCEDURE — 36415 COLL VENOUS BLD VENIPUNCTURE: CPT

## 2022-07-20 PROCEDURE — 6370000000 HC RX 637 (ALT 250 FOR IP): Performed by: PHYSICAL MEDICINE & REHABILITATION

## 2022-07-20 PROCEDURE — 99239 HOSP IP/OBS DSCHRG MGMT >30: CPT | Performed by: PHYSICAL MEDICINE & REHABILITATION

## 2022-07-20 PROCEDURE — 97116 GAIT TRAINING THERAPY: CPT

## 2022-07-20 PROCEDURE — 97535 SELF CARE MNGMENT TRAINING: CPT

## 2022-07-20 RX ADMIN — TRAMADOL HYDROCHLORIDE 25 MG: 50 TABLET, COATED ORAL at 06:01

## 2022-07-20 RX ADMIN — Medication 100 MG: at 08:53

## 2022-07-20 RX ADMIN — ACETAMINOPHEN 500 MG: 500 TABLET ORAL at 06:00

## 2022-07-20 RX ADMIN — FUROSEMIDE 40 MG: 40 TABLET ORAL at 08:54

## 2022-07-20 RX ADMIN — ASPIRIN 81 MG: 81 TABLET, COATED ORAL at 08:54

## 2022-07-20 RX ADMIN — INSULIN LISPRO 2 UNITS: 100 INJECTION, SOLUTION INTRAVENOUS; SUBCUTANEOUS at 08:54

## 2022-07-20 RX ADMIN — ARIPIPRAZOLE 5 MG: 5 TABLET ORAL at 08:54

## 2022-07-20 RX ADMIN — ISOSORBIDE MONONITRATE 120 MG: 60 TABLET, EXTENDED RELEASE ORAL at 08:54

## 2022-07-20 RX ADMIN — Medication 400 MG: at 08:54

## 2022-07-20 RX ADMIN — MIDODRINE HYDROCHLORIDE 5 MG: 5 TABLET ORAL at 08:54

## 2022-07-20 ASSESSMENT — PAIN SCALES - GENERAL: PAINLEVEL_OUTOF10: 5

## 2022-07-20 NOTE — PROGRESS NOTES
[address] [facility] Pharmacokinetic Monitoring Service - Vancomycin    Consulting Provider: Dr. Ross Files    Indication: infected dialysis cath   Target Concentration: Pre-Dialysis Concentration 21-24 mg/L  Day of Therapy: 20  Additional Antimicrobials: none at this time     Pertinent Laboratory Values: Wt Readings from Last 1 Encounters:   07/16/22 193 lb 12.6 oz (87.9 kg)     Temp Readings from Last 1 Encounters:   07/20/22 98.2 °F (36.8 °C) (Oral)     No results for input(s): CREATININE, WBC in the last 72 hours. Invalid input(s):  BUN    Procalcitonin: N/A    Pertinent Cultures:  Blood cultures from admission 2 sets growing gram-positive cocci in chains  Identified as Enterococcus faecalis by PCR    MRSA Nasal Swab: N/A. Non-respiratory infection.     Recent vancomycin administrations                     vancomycin (VANCOCIN) 1,500 mg in dextrose 5 % 500 mL IVPB (ADDAVIAL) (mg) 1,500 mg New Bag 07/17/22 0180                    Assessment:  Date/Time Current Dose Concentration Dialysis Session   07/20/22 1500mg IV X 1 dose 7/17/22  Dose HELD 07/18/22 18.6mg/L 7/20/22      Plan:  Concentration-guided dosing due to renal impairment and intermittent hemodialysis   Current dosing regimen of 1500mg post dialysis is therapeutic   Restart vancomycin at 1250mg IV post hemodialysis  sessions   Vancomycin concentration ordered for 07/22/22  @ 0600, prior to HD session   Pharmacy will continue to monitor patient and adjust therapy as indicated    Thank you for the consult,    Darya Lara PharmD, BCPS   7/20/2022 11:15 AM

## 2022-07-20 NOTE — PROGRESS NOTES
Patient is resting in bed with no c/o pain or discomfort. Vascath dressing is clean, dry and intact.

## 2022-07-20 NOTE — PROGRESS NOTES
Nephrology Progress Note    Assessment:  ESRDX  OHDX  DM ty;pe-2  Anemia  HxCVA      Plan: discharge   dialysis at unit AM    Patient Active Problem List:     Atherosclerotic heart disease of native coronary artery with unspecified angina pectoris (HCC)     Schizophrenia, paranoid, chronic     Metabolic syndrome     Vitamin B 12 deficiency     Cerebral microvascular disease     Mixed hyperlipidemia     Other hammer toe (acquired)     Vitamin D insufficiency     Incontinence of urine     Diabetic nephropathy with proteinuria (HCC)     Essential (primary) hypertension     History of type C viral hepatitis     Urinary incontinence due to cognitive impairment     History of seizures     Stented coronary artery-plan is to stay on Plavix indefinately per Dr Héctor Farr     Other specified diabetes mellitus with diabetic neuropathy, unspecified ([address]     Controlled type 2 diabetes mellitus with diabetic neuropathy, with long-term current use of insulin (HCC)     Hemiparesis, left (HCC)     Angina, class II (HCC)     Pain, unspecified     Tardive dyskinesia     Shortness of breath     Uncontrolled type 2 diabetes mellitus with hyperglycemia (HCC)     Chronic diastolic congestive heart failure (HCC)     Sleep apnea, unspecified     Pulmonary hypertension, unspecified (HCC)     Class 2 severe obesity with serious comorbidity and body mass index (BMI) of 36.0 to 36.9 in adult [facility])     Edema     Closed supracondylar fracture of right humerus     Other chronic pain     Palliative care patient     Recurrent falls     Renal failure     Difficulty in walking     ESRD (end stage renal disease) on dialysis ([address]     Weakness     Other seizures (HCC)     Moderate persistent asthma without complication     [id number]-[id number]     Post PTCA     Falls frequently     Chest wall contusion, left, initial encounter     Headache, unspecified     Paranoid schizophrenia ([address]     Compression fracture of spine ([address]     Closed rib fracture     Depression Chronic obstructive pulmonary disease (HCC)     Critical illness polyneuropathy ([facility])     Multiple closed fractures of ribs of right side     Nonrheumatic mitral valve regurgitation     Nonrheumatic tricuspid valve regurgitation     Need for extended care facility     Chronic pain of both shoulders     Hemodialysis-associated hypotension     Anginal chest pain at rest [facility])     Chest pain     Unstable angina ([facility])     NSTEMI (non-ST elevated myocardial infarction) ([facility])     CKD (chronic kidney disease) stage 4, GFR 15-29 ml/min ([facility])     Hyperkalemia     Impaired mobility and activities of daily living dt polyneuropathy and reccent fall      Dialysis patient [facility])     Unspecified open wound of right upper arm, initial encounter     Multiple closed fractures of right lower extremity and ribs     Closed T11 fracture ([facility])     Encephalopathy acute     MRSA bacteremia     Sepsis due to Enterococcus ([address]     Local infection due to central venous catheter     DJD (degenerative joint disease), cervical     Closed head injury     Sarcopenia     Fall from standing     Septicemia ([address]     Chronic hepatitis C ([address]     Catheter-related bloodstream infection     Enterococcus faecalis infection     Cervical stenosis of spinal canal     Ataxic gait     Lumbar stenosis with neurogenic claudication     Falls infrequently      Subjective:  Admit Date: 7/9/2022    Interval History: no new complaints    Medications:  Scheduled Meds:   insulin glargine  25 Units SubCUTAneous Nightly    furosemide  40 mg Oral Daily    traMADol  25 mg Oral 3 times per day    acetaminophen  500 mg Oral 3 times per day    Vitamin D  2,000 Units Oral Dinner    cyanocobalamin  1,000 mcg IntraMUSCular Weekly    coenzyme Q10  100 mg Oral Daily    ARIPiprazole  5 mg Oral Daily    aspirin EC  81 mg Oral Daily    atorvastatin  40 mg Oral Nightly    insulin lispro  0-12 Units SubCUTAneous TID WC    insulin lispro  0-6 Units SubCUTAneous Nightly    isosorbide mononitrate  120 mg Oral Daily    lidocaine  3 patch TransDERmal Daily    magnesium oxide  400 mg Oral Daily    melatonin  10 mg Oral Nightly    midodrine  5 mg Oral Once per day on Mon Wed Fri    polyethylene glycol  17 g Oral Daily    sertraline  50 mg Oral Nightly    vancomycin (VANCOCIN) intermittent dosing (placeholder)   Other RX Placeholder     Continuous Infusions:   dextrose         CBC: No results for input(s): WBC, HGB, PLT in the last 72 hours. CMP:  No results for input(s): NA, K, CL, CO2, BUN, CREATININE, GLUCOSE, CALCIUM, LABGLOM in the last 72 hours. Troponin: No results for input(s): TROPONINI in the last 72 hours. BNP: No results for input(s): BNP in the last 72 hours. INR: No results for input(s): INR in the last 72 hours. Lipids: No results for input(s): CHOL, LDLDIRECT, TRIG, HDL, AMYLASE, LIPASE in the last 72 hours. Liver: No results for input(s): AST, ALT, ALKPHOS, PROT, LABALBU, BILITOT in the last 72 hours. Invalid input(s): BILDIR  Iron:  No results for input(s): IRONS, FERRITIN in the last 72 hours. Invalid input(s): LABIRONS  Urinalysis: No results for input(s): UA in the last 72 hours.     Objective:  Vitals: /62   Pulse 80   Temp 98.2 °F (36.8 °C) (Oral)   Resp 17   Ht 5' 7\" (1.702 m)   Wt 193 lb 12.6 oz (87.9 kg)   LMP  (LMP Unknown)   SpO2 97%   BMI 30.35 kg/m²    Wt Readings from Last 3 Encounters:   07/16/22 193 lb 12.6 oz (87.9 kg)   07/08/22 189 lb 9.5 oz (86 kg)   06/22/22 217 lb (98.4 kg)      24HR INTAKE/OUTPUT:    Intake/Output Summary (Last 24 hours) at 7/20/2022 0836  Last data filed at 7/19/2022 2045  Gross per 24 hour   Intake 900 ml   Output 1750 ml   Net -850 ml       General: alert, in no apparent distress  HEENT: normocephalic, atraumatic, anicteric  Neck: supple, no mass  Lungs: non-labored respirations, clear to auscultation bilaterally  Heart: regular rate and rhythm, no murmurs or rubs  Abdomen: soft, non-tender, non-distended  Ext: no cyanosis, no peripheral edema  Neuro: alert and oriented, no gross abnormalities  Psych: normal mood and affect  Skin: no rash      Electronically signed by Paulo Davidson DO, MD

## 2022-07-20 NOTE — PROGRESS NOTES
Physical Therapy Rehab Treatment Note  [facility]  [facility]       NAME: Jatin Bates  : 1960 (32 y.o.)  MRN: [id number]  CODE STATUS: Full Code    Date of Service: 2022       Restrictions:  Restrictions/Precautions: Fall Risk  Position Activity Restriction  Other position/activity restrictions: R Rib fractures - 10 and 11 per patient, dialysis port L chest, No Showering       SUBJECTIVE:   Subjective: Patient resting in bed. C/o nausea and fatigue. Request PT session at bedside    Pain  Denies pain       OBJECTIVE:   Roll Left  Assistance Level: Independent  Roll Right  Assistance Level: Independent  Sit to Supine  Assistance Level: Independent  Supine to Sit  Assistance Level: Independent  Scooting  Assistance Level: Independent    Sit to Stand  Assistance Level: Independent  Stand to Sit  Assistance Level: Independent    Ambulation  Surface: Level surface  Device: Rollator  Distance: 40 feet- distance not focus  Activity: Within Room  Additional Factors: Verbal cues    PT Exercises  Exercise Treatment: Supine Exercises: Quad sets, Glute sets, Ankle pumps, Hip abduction, Straight leg raise, Bridging x 20     ASSESSMENT/PROGRESS TOWARDS GOALS: Patient continues to demonstrate independence with mobility. Tolerance limited by nausea this AM. Reviewed HEP. Patient requires intermittent cuing for supine exercises. Goals:  Long Term Goals  Long term goal 1: Indep bed mobility  Long term goal 2: Indep transfers bed to chair with safest assistive device  Long term goal 3: Ambulate with rollator Indep on level and ramp surfaces >/= 50' to allow safe return home. Long term goal 4: Pt will demonstrate improved score on [place] balance assessment 48/56 for decreased risk for falls. PLAN OF CARE/Safety:   Safety Devices  Type of Devices: All fall risk precautions in place; Bed alarm in place;Call light within reach      Therapy Time:   Individual   Time In 30   Time Out 1000   Minutes 30     Minutes:30  Transfer/Bed mobility trainin  Gait trainin  Therapeutic ex:20      Lianne Osborne PTA, 22 at 12:26 PM

## 2022-07-20 NOTE — CARE COORDINATION
Spoke with Dr Ramona Frazier about DC and antibiotics he asked me to reach out to ID. Perfect serve sent to ID. Updated the RN caring for patient DC is planned for home today. Electronically signed by Rebekah Washington RN on 7/20/22 at 9:34 AM EDT   No response per secure message called office who directed me to call [facility] patients today. Called AS updated the RN caring for patient. Electronically signed by Rebekah Washington RN on 7/20/22 at 10:27 AM EDT   Spoke with [facility] and they need RX with dose faxed to them from the managing ID Dr in order to continue the Vancomycin with dialysis.    Electronically signed by Rebekah Washington RN on 7/20/22 at 10:56 AM EDT

## 2022-07-20 NOTE — PLAN OF CARE
Problem: Safety - Adult  Goal: Free from fall injury  7/19/2022 2309 by Laila Tracy RN  Outcome: Progressing     Problem: Skin/Tissue Integrity  Goal: Absence of new skin breakdown  Description: 1. Monitor for areas of redness and/or skin breakdown  2. Assess vascular access sites hourly  3. Every 4-6 hours minimum:  Change oxygen saturation probe site  4. Every 4-6 hours:  If on nasal continuous positive airway pressure, respiratory therapy assess nares and determine need for appliance change or resting period.   7/19/2022 2309 by Laila Tracy, RN  Outcome: Progressing

## 2022-07-20 NOTE — PROGRESS NOTES
OCCUPATIONAL THERAPY  INPATIENT REHAB TREATMENT NOTE  [facility]      NAME: Juliana Lovell  : 1960 (8 y.o.)  MRN: [id number]  CODE STATUS: Full Code  Room: R248/R248-01    Date of Service: 2022    Referring Physician: Dr. Alba Phan  Rehab Diagnosis: impaired mobility and ADLs d/t polyneuropathy and recent fall    Restrictions  Restrictions/Precautions  Restrictions/Precautions: Fall Risk         Position Activity Restriction  Other position/activity restrictions: R Rib fractures - 10 and 11 per patient, dialysis port L chest, No Showering    Patient's date of birth confirmed: Yes    SAFETY:  Safety Devices  Safety Devices in place: Yes  Type of devices: All fall risk precautions in place    SUBJECTIVE:  Subjective: \" I want to do in bed exercises, do a shampoo cap, and go to the bathroom and put my shoes on. \"    Pain at start of treatment: Yes: 10    Pain at end of treatment: Yes: 4/10    Location: L knee and back  Nursing notified: No  RN: Himanshu Mensah  Intervention: None    COGNITION:  Orientation  Overall Orientation Status: Within Normal Limits  Orientation Level: Oriented to place;Oriented to time;Oriented to person;Oriented to situation  Cognition  Overall Cognitive Status: WFL  Arousal/Alertness: Appropriate responses to stimuli  Following Commands: Follows multistep commands consistently  Attention Span: Appears intact  Memory: Decreased recall of precautions  Safety Judgement: Decreased awareness of need for safety;Decreased awareness of need for assistance  Problem Solving: Assistance required to generate solutions;Assistance required to implement solutions  Insights: Decreased awareness of deficits  Initiation: Requires cues for some  Sequencing: Requires cues for some          OBJECTIVE:         Grooming/Oral Hygiene  Skilled Clinical Factors: Shampoo cap completed this day (but not counted d/t washing pt hair not counting per functional abilities with ADL tasks.  Pt able to brush hair with Mod Ind. Putting On/Taking Off Footwear  Assistance Level: Supervision  Skilled Clinical Factors: Pt able to don/doff socks and don shoes at EOB withwout AE  Toileting  Assistance Level: Minimal assistance  Skilled Clinical Factors: Pt able to complete task but not thorough. Pt needs assist for posterior care for thorough cleansing. Toilet Transfers  Technique: Stand step  Equipment: Standard toilet;Grab bars  Assistance Level: Stand by assist  Skilled Clinical Factors: Pt fluctuates between Sup and SBA d/t impulsiveness at times and safety awareness, pacing concerns increasing risk of falls. Pt went from bathroom to bed, and laid down, repeated herself 3x's about needing her shoes and would put them on in a minute after resting. Pt demonstrates decreased standing and act tolerance per completing mobility and ADL tasks. Functional Mobility  Device: 4-Wheeled walker  Activity: To/From bathroom  Assistance Level: Stand by assist  Supine to Sit  Assistance Level: Modified independent  Scooting  Assistance Level: Modified independent  Sit to Stand  Assistance Level: Supervision  Stand to Sit  Assistance Level: Supervision  Pt req SBA/Sup for STS transfers d/t safety recognition deficits. Pt unlocked brakes on rollator before standing and stood before therapist could say anything, with pt demo'ing a slight LOB but was able to self recover but provided CGA for safety. Pt stated, \" Oh my brakes weren't on. \" Pt was reminded that her brakes were on but she took them off before standing. Provided assist to pt to place already packed bags into laundry basket, but gave pt a bag while she was in the bathroom and she gathered all needed items while seated from sink and asked for certain items to be handed to her to be placed in bag to take home. Pt completed all ADL tasks to improve functional ease, safety and Ind with ADL tasks.                    Education:  Education  Education Given To: Patient  Education Provided: Transfer Training  Education Method: Demonstration;Verbal  Barriers to Learning: Cognition  Education Outcome: Demonstrated understanding;Verbalized understanding    Equipment recommendations:         ASSESSMENT:  Assessment: Pt anxious this day possibly d/t knowing she is going home  Activity Tolerance: Patient tolerated treatment well;Treatment limited secondary to decreased cognition      PLAN OF CARE:  Strengthening, Balance training, Functional mobility training, Endurance training, Safety education & training, Patient/Caregiver education & training, Equipment evaluation, education, & procurement, Self-Care / ADL, Home management training  continue with current POC until discharge of 22    Patient goals : work on strengthening my arms and legs, and improve balance so I don't fall           Therapy Time:   Individual Group Co-Treat   Time In 1030       Time Out 1100         Minutes 30                   ADL/IADL trainin minutes     Electronically signed by:     MARY Hess,   2022, 11:26 AM

## 2022-07-20 NOTE — FLOWSHEET NOTE
Spoke with Dr. Farshad Ramon regarding vancomycin prescription. Dr. Farshad Ramon stated he took care of it through the dialysis center.

## 2022-07-20 NOTE — PROGRESS NOTES
[facility] OCCUPATIONAL THERAPY DISCHARGE SUMMARY- REHAB     Date: 2022  Patient Name: Breanna Coleman        MRN: [id number]  Account: [de-identified]   : 1960  (57 y.o.)  Room: [place] [address]      Updated discharge summary due to extension of patient's stay on rehab unit. Diagnosis:  impaired mobility and ADLs d/t polyneuropathy and recent fall    Past Medical History:   Diagnosis Date    Angina at rest [facility]) 2020    Anxiety     CAD S/P percutaneous coronary angioplasty ,     stents per dr Maribell Caceres    CHF (congestive heart failure) [facility]     CKD (chronic kidney disease) stage 4, GFR 15-29 ml/min ([facility] 2018    CKD stage 4 due to type 2 diabetes mellitus ([facility]     Contusion of right chest wall 2021    COPD (chronic obstructive pulmonary disease) ([facility] [facility]     Diabetic nephropathy with proteinuria ([facility]     DJD (degenerative joint disease) of knee     Dr Zhane Hdez    GERD (gastroesophageal reflux disease)     Hemiparesis, left ([facility] 2013    entered Ass[facility] Living ([facility])    Hemodialysis patient [facility])     Hemodialysis-associated hypotension 10/22/2021    History of heart failure     History of seizures     History of type C viral hepatitis     HTN (hypertension)     Hyperlipidemia     Impaired mobility and activities of daily living     Mediastinal lymphadenopathy     Mohan Mosher    Metabolic syndrome     Moderate persistent asthma without complication 6987    Need for extended care facility 2021    Neurogenic urinary incontinence 2013    Neuropathy in diabetes ([facility]     Nonrheumatic mitral valve regurgitation 2021    Nonrheumatic tricuspid valve regurgitation 2021    Obesity (BMI 30-39. 9)     Recurrent UTI     S/P colonoscopy     [facility], focal active colitis    Schizophrenia, paranoid, chronic ([facility]     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 Silvia Roger      x1 Dr. Bessie Vega, Dr Tisha Villeda  08/10/2021    [facility]    DIAGNOSTIC CARDIAC CATH LAB PROCEDURE  10/02/2019    DIALYSIS CATHETER INSERTION Left 2022    Tunneled Symetrex 15.5F x 23cm hemodialysis catheter inserted by Dr. La Alexander Left 2022    [id number] replamcent tunneld HD cath by Dr. Ng Co, TOTAL ABDOMINAL (CERVIX REMOVED)      one ovary intact, Dr Cervantes Child, menorrhagia    IR THROMBECTOMY PERCUT AVF  2022    IR THROMBECTOMY PERCUT AVF 2022 [facility] PROCEDURE    IR TUNNELED CATHETER PLACEMENT GREATER THAN 5 YEARS  2022    IR TUNNELED CATHETER PLACEMENT GREATER THAN 5 YEARS 6/3/2022 [facility] PROCEDURE    IR TUNNELED CATHETER PLACEMENT GREATER THAN 5 YEARS Left 2022 Dr. Suzanne Morales exchanged to 15.5F x 28 cm.      15.5 fr by 23 cm Symetrex dialysis catheter inserted by Dr Suzanne Morales    IR TUNNELED [place] 5 YEARS  2022    IR TUNNELED CATHETER PLACEMENT GREATER THAN 5 YEARS 2022 [facility] SPECIAL PROCEDURE    DE TOTAL KNEE ARTHROPLASTY Left 2018    LEFT KNEE TOTAL KNEE ARTHROPLASTY, SHAYNA, NERVE BLOCK [facility]     TOTAL KNEE ARTHROPLASTY  2016    Dr Werner Barba    TUNNELED [address] Right 2020    tunneled HD catheter per Dr Tracey Bhat       Precautions:   Restrictions/Precautions: Fall Risk  Other position/activity restrictions: R Rib fractures - 10 and 11 per patient, dialysis port L chest, No Showering     Social/Functional History:  Social/Functional History  Lives With: Daughter, Son, Family (pt states she is never home alone, pt's dtr, handicapped son, and grandchildren (22 y/o, 26 y/o, and 15 y/o))  Type of Home: House  Home Layout: Two level, Able to Live on Main level with bedroom/bathroom, Performs ADL's on one level (pt has first floor set up)  Home Access: Ramped entrance  Bathroom Shower/Tub:  (washes up at sink)  Bathroom Toilet: Standard  Bathroom Accessibility: Walker accessible  Home Equipment: Hospital bed, Rollator  Has the patient had two or more falls in the past year or any fall with injury in the past year?: Yes  Receives Help From: Family (dtr is official HHA)  ADL Assistance: Needs assistance (assist with socks/brief/pans over feet, bra. Assist for hygiene after BM and with bathing)  Bath: Moderate assistance  Dressing:  Moderate assistance  Grooming: Independent  Feeding: Independent  Toileting: Needs assistance (dtr will perform toilet hygiene after BM)  Homemaking Assistance: Needs assistance (dtr completes cooking and cleaning)  Homemaking Responsibilities: No  Ambulation Assistance: Independent (rollator)  Active : No (daughter and public transportation)  Patient's  Info: T bus for dialysis days (T, Thur, Sat) and doctor's appointments  Mode of Transportation: [place] (dtr drives a [employer])  Education: 12th grade  Occupation: On disability (has been on disability for 20 years--injured back and knee and had mental instability per pt)  Type of Occupation: nurses aide prior  Leisure & Hobbies: watch TV    Current Functional Status:  ADL  Equipment Provided: Sock aid  Feeding: Modified independent   Grooming: Modified independent   UE Bathing: Modified independent   LE Bathing: Minimal assistance  UE Dressing: Modified independent   LE Dressing: Stand by assistance  Toileting: Minimal assistance  Toilet Transfers  Toilet - Technique: Ambulating  Equipment Used: Standard toilet  Toilet Transfer: Supervision     Shower Transfers  Shower Transfers: Not tested  Shower Transfers Comments: pt will sponge bathe when returning home    Orientation Status:  Orientation  Overall Orientation Status: Within Normal Limits    Cognition Status:  Cognition  Overall Cognitive Status: WFL  Arousal/Alertness: Appropriate responses to stimuli  Following Commands:  Follows multistep commands consistently  Attention Span: Appears intact  Memory: Decreased recall of precautions  Safety Judgement: Decreased awareness of need for safety, Decreased awareness of need for assistance  Problem Solving: Assistance required to generate solutions, Assistance required to implement solutions  Insights: Decreased awareness of deficits  Initiation: Requires cues for some  Sequencing: Requires cues for some  Cognition Comment: comp: SUP, exp: SUP, soc int: MIN A, prob solving: MIN A, mem: SUP    Perception Status:  Perception  Overall Perceptual Status: WFL    Sensation Status:  Sensation  Overall Sensation Status: Impaired (neuropathy in hands/feet)    Vision and Hearing Status:  Vision  Vision: Impaired  Vision Exceptions: Wears glasses for distance (patient reports she wears glassess for tv)  Hearing  Hearing: Within functional limits     UE Function Status:    ROM:   LUE AROM (degrees)  LUE AROM : Exceptions  LUE General AROM: limited shoulder internal rotation AND  flex/ext  Left Hand AROM (degrees)  Left Hand AROM: WFL  RUE AROM (degrees)  RUE AROM : Exceptions  RUE General AROM: limited shoulder internal rotation AND shoulder flex/ext  Right Hand AROM (degrees)  Right Hand AROM: WFL    Strength:  LUE Strength  Gross LUE Strength: Exceptions to [facility] Hand General: 3-/5  LUE Strength Comment: 3-/5 proximal and 4/5 distal  RUE Strength  Gross RUE Strength: Exceptions to [facility] Hand General: 3/5  RUE Strength Comment: 3-/5 proximal and 4/5 distal    Coordination, Tone, Quality of Movement:    Tone RUE  RUE Tone: Normotonic  Tone LUE  LUE Tone: Normotonic  Coordination  Movements Are Fluid And Coordinated: No  Coordination and Movement Description: Gross motor impairments, Fine motor impairments, Right UE, Left UE    D/C Recommendations:    Equipment Recommendations:  OT D/C Equipment  Equipment Needed: No    OT Follow Up:  OT D/C RECOMMENDATIONS  REQUIRES OT FOLLOW-UP: Yes  Type: Home OT    Home Exercise Program Provided: [x] Yes [] No  If yes, type of HEP: UE strengthening     Electronically signed by:    ELDA Driscoll/L,    7/20/2022, 3:17 PM

## 2022-07-20 NOTE — PLAN OF CARE
Problem: Discharge Planning  Goal: Discharge to home or other facility with appropriate resources  Outcome: Completed     Problem: Safety - Adult  Goal: Free from fall injury  7/20/2022 1206 by Ramin Shook RN  Outcome: Completed  7/19/2022 2309 by Leti Staton RN  Outcome: Progressing     Problem: Skin/Tissue Integrity  Goal: Absence of new skin breakdown  Description: 1. Monitor for areas of redness and/or skin breakdown  2. Assess vascular access sites hourly  3. Every 4-6 hours minimum:  Change oxygen saturation probe site  4. Every 4-6 hours:  If on nasal continuous positive airway pressure, respiratory therapy assess nares and determine need for appliance change or resting period.   7/20/2022 1206 by Ramin Shook RN  Outcome: Completed  7/19/2022 2309 by Leti Staton RN  Outcome: Progressing     Problem: ABCDS Injury Assessment  Goal: Absence of physical injury  Outcome: Completed     Problem: Nutrition Deficit:  Goal: Optimize nutritional status  Outcome: Completed     Problem: Chronic Conditions and Co-morbidities  Goal: Patient's chronic conditions and co-morbidity symptoms are monitored and maintained or improved  Outcome: Completed     Problem: Neurosensory - Adult  Goal: Achieves maximal functionality and self care  Outcome: Completed     Problem: Respiratory - Adult  Goal: Achieves optimal ventilation and oxygenation  Outcome: Completed     Problem: Cardiovascular - Adult  Goal: Maintains optimal cardiac output and hemodynamic stability  Outcome: Completed  Goal: Absence of cardiac dysrhythmias or at baseline  Outcome: Completed     Problem: Skin/Tissue Integrity - Adult  Goal: Skin integrity remains intact  Outcome: Completed     Problem: Musculoskeletal - Adult  Goal: Return mobility to safest level of function  Outcome: Completed  Goal: Return ADL status to a safe level of function  Outcome: Completed     Problem: Gastrointestinal - Adult  Goal: Maintains adequate nutritional intake  Outcome: Completed     Problem: Genitourinary - Adult  Goal: Absence of urinary retention  Outcome: Completed     Problem: Infection - Adult  Goal: Absence of infection at discharge  Outcome: Completed     Problem: Metabolic/Fluid and Electrolytes - Adult  Goal: Electrolytes maintained within normal limits  Outcome: Completed     Problem: Hematologic - Adult  Goal: Maintains hematologic stability  Outcome: Completed

## 2022-07-20 NOTE — PROGRESS NOTES
Physical Therapy  Facility/Department: [facility]  Rehabilitation Discharge note    NAME: Jennifer Tolentino  : 1960  MRN: [id number]    Date of discharge: 22    Past Medical History:   Diagnosis Date    Angina at rest [facility]) 2020    Anxiety     CAD S/P percutaneous coronary angioplasty ,     stents per dr Alexandria Alva    CHF (congestive heart failure) ([address]     CKD (chronic kidney disease) stage 4, GFR 15-29 ml/min ([address] 2018    CKD stage 4 due to type 2 diabetes mellitus ([address]     Contusion of right chest wall 2021    COPD (chronic obstructive pulmonary disease) ([address]     Diabetic nephropathy with proteinuria ([address]     DJD (degenerative joint disease) of knee     Dr Lee Done    GERD (gastroesophageal reflux disease)     Hemiparesis, left ([address] 2013    entered Assisted Living ([facility])    Hemodialysis patient [facility])     Hemodialysis-associated hypotension 10/22/2021    History of heart failure     History of seizures     History of type C viral hepatitis     HTN (hypertension)     Hyperlipidemia     Impaired mobility and activities of daily living     Mediastinal lymphadenopathy 2013    Dr Waqas Shankar [facility]    Metabolic syndrome     Moderate persistent asthma without complication 3457    Need for extended care facility 2021    Neurogenic urinary incontinence 2013    Neuropathy in diabetes ([address]     Nonrheumatic mitral valve regurgitation 2021    Nonrheumatic tricuspid valve regurgitation 2021    Obesity (BMI 30-39. 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 ([address]     Type 2 diabetes mellitus with renal manifestations, controlled ([address] 2015    Insulin 11 per patient, dialysis port L chest, No Showering    Objective  Roll Left  Assistance Level: Independent  Roll Right  Assistance Level: Independent  Sit to Supine  Assistance Level: Independent  Supine to Sit  Assistance Level: Independent  Scooting  Assistance Level: Independent     Sit to Stand  Assistance Level: Independent  Stand to Sit  Assistance Level: Independent    Ambulation  Surface: Carpet; Level surface; Uneven surface  Device: Rollator  Distance: 50'  Activity: Within Unit  Activity Comments: Reciprocal pattern good safety  Assistance Level: Independent     Outcomes Measures:  Ugalde Balance Score: 39       Pt/ family education/training: Pt has been educated in safe mobility and demonstrates good follow thru when medically able to participate. Pt has been educated in HEP and demonstrates indep performance    Assessment: Pt has made excellent gains. She has met goals except Ugalde score which she did progress towards      LTG established:  Long term goal 1: Indep bed mobility  Long term goal 2: Indep transfers bed to chair with safest assistive device  Long term goal 3: Ambulate with rollator Indep on level and ramp surfaces >/= 50' to allow safe return home. Long term goal 4: Pt will demonstrate improved score on Ugalde balance assessment 48/56 for decreased risk for falls.     Discharge Plan: d/c to home with follow up PT recommended        Electronically signed by Flores Garza PT on 7/20/2022 at 2:03 PM

## 2022-07-20 NOTE — DISCHARGE INSTR - DIET

## 2022-07-21 ENCOUNTER — CARE COORDINATION (OUTPATIENT)
Dept: CASE MANAGEMENT | Age: 62
End: 2022-07-21

## 2022-07-21 NOTE — PROGRESS NOTES
[facility]   Facility/Department: [facility] SPECIAL PROCEDURE  Speech Language Pathology  Discharge Report        Patient: Contreras Shelley  : 1960    Date: 2022    Initial Status:  Diet:   Soft, bite size solids with thin liquids  Dysphagia Outcome Severity Scale:  Ratin    Speech Therapy Level of Assistance Scale: Auditory Comprehension:  Rating: Modified Independent  Verbal Expression:  Rating:Modified Independent  Motor Speech:  Rating: Independent  Problem Solving:  Rating: Supervised Assistance  Memory:  Rating: Minimal Assistance      Long Term Goals:   Patient will demonstrate functional cognitive-linguistic abilities in all opportunities with modified independence in order to safely complete ADLs        Patient's Response to Therapy:  Patient presented with improved strength of the oral and pharyngeal phase of the swallow mechanism. Improved Problem solving and memory skills were noted with patient requiring supervised assistance. Discharge Status:  Diet:   Regular solids with thin liquids     Dysphagia Outcome Severity Scale:  Ratin    Speech Therapy Level of Assistance Scale: Auditory Comprehension:  Rating: Independent-Modified Independent  Verbal Expression:  Rating:Independent-Modified Independent  Motor Speech:  Rating: Independent  Problem Solving:  Rating: Supervised Assistance  Memory:  Rating: Supervised Assistance        Functional Status at time of Discharge:    Cognition: Patient demonstrates mild cognitive deficits. Language: Patient demonstrates no language deficits. Motor Speech: Patient demonstrates no motor speech deficits. Swallow: Patient demonstrates no dysphagia.                                  Patient is discharged to Home               [] Recommend continued speech therapy   [x] Speech Therapy is no longer warranted      Signature:  Electronically signed by ANN MARIE Cohen on 2022 at 3:14 PM

## 2022-07-21 NOTE — CARE COORDINATION
[facility] Transitions Initial Follow Up Call    Call within 2 business days of discharge: Yes    Patient: Eulalio Oconnell Patient : 1960   MRN: [id number]  Reason for Admission: Impaired Mobility and Activities of Daily Living d/t Polyneuropathy and Recent Fall  Discharge Date: 22 RARS: Readmission Risk Score: 26.6      Last Discharge [facility]       Date Complaint Diagnosis Description Type Department Provider    22  Ataxic gait . .. Admission (Discharged) [facility],      Attempted to reach patient by phone for initial post hospital discharge follow up. HIPAA compliant message left on patient's voicemail; CTN contact information provided.              Follow Up  Future Appointments   Date Time Provider Aminata Cortes   2022 11:30 AM MD Renee Elamamarie [facility]   2022  1:30 PM Osmel Fonseca, [address] [place] [address]   2022  1:00 PM Elvin Overton MD [facility]   2022  2:00 PM Elvin Overton MD [address] [place] [address]

## 2022-07-22 ENCOUNTER — CARE COORDINATION (OUTPATIENT)
Dept: CASE MANAGEMENT | Age: 62
End: 2022-07-22

## 2022-07-22 DIAGNOSIS — R26.0 ATAXIC GAIT: Primary | ICD-10-CM

## 2022-07-22 PROCEDURE — 1111F DSCHRG MED/CURRENT MED MERGE: CPT | Performed by: FAMILY MEDICINE

## 2022-07-22 RX ORDER — ONDANSETRON 4 MG/1
4 TABLET, FILM COATED ORAL EVERY 8 HOURS PRN
COMMUNITY

## 2022-07-22 RX ORDER — FOLIC ACID/VIT B COMPLEX AND C 0.8 MG
1 TABLET ORAL DAILY
COMMUNITY

## 2022-07-22 NOTE — CARE COORDINATION
Al[facility] Transitions Initial Follow Up Call    Call within 2 business days of discharge: Yes    Patient: Darry Frankel Patient : 1960   MRN: <[id number]>  Reason for Admission: Ataxic gait  Discharge Date: 22 RARS: Readmission Risk Score: 26.6      Last Discharge [facility]       Date Complaint Diagnosis Description Type Department Provider    22  Ataxic gait . .. Admission (Discharged) [facility],              Spoke with both the pt and the pt's daughter, Estela Miranda (On HIPAA), for an initial care transition call. Pt admitted initially to [facility] on 22 with dx bacteremia (infected HD port), LE weakness, s/p fall with R rib fx. Pt then was transferred to the IP ARU on 22 d/t ADL and mobility deficits. Pt states that she is doing \"okay\" today but did report that she had 1 episode of vomiting this morning. Pt states that this is an ongoing issue and has no real pattern of when it occurs. Pt does have a rx for Zofran at home that Lolawisam Ruben states she gave the pt today already. CTN updated her that a rx was sent to her pharmacy for Tigan. Estela Miranda states that she has not went to the pharmacy yet to pick the pt's rxs up but will be getting them today and will try giving this to her to see if it will be more effective. Pt is s/p insertion of L tunneled HD cath to L IJ on 22. 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 sEtela Miranda states that they will be scheduling a f/u with the vascular doctor again to reevaluate the fistula. For now, pt is receiving HD through her tunneled cath. She states that she had HD yesterday with no difficulties. Noted pt did have +BC during her IP stay (+E. Faecalis) and old port was removed and new one inserted as mentioned above. Pt received OP HD at [facility] location on T-TH-Sat with chair time of 6am. Pt states that she utilizes public transit through [facility].  Pt is scheduled for HD again tomorrow. Pt does c/o pain at \"6\" out of 10 on a 0-10 pain scale to her R lower back/side and L knee. Pt states that it isn't bad enough to require taking Ultram, so she has been taking Tylenol instead. Pt does have a new rx for Ultram at the pharmacy that her daughter will be picking up today. Pt has been on Ultram intermittently and states that she has a couple of tabs left at home if needed. Pt states that she ambulates around the house with her rollator. Pt has a hospital bed as well. Pt's daughter states that she is the pt's primary caregiver. She states that she is a HHA that is hired through [employer]. Elaine states that the pt also has an RN that does visits through this [place] [id number] every 60 days, but does f/u visits if the pt is discharged from the hospital. She states that the nurse was already there and reviewed the pt's AVS with them and medications. CTN did complete a full med review with Elaine, as pt preferred CTN speak with her. 1111F code entered, as is a  pcp. Pt has all scripts, except for new ones at his discharge. Daughter is aware that pt has 3 new rxs to  today. Stopped and changed medications also reviewed with Elaine. Pt is in no need for refills. Elaine states that the pt checks her BS readings between 3-4x/day. FBS today was 212. CTN advised that pt keep close watch on her BS readings, especially if she is vomiting. CTN strongly advised the pt/daughter to contact the pt's pcp if vomiting is persistent despite giving her the anti nausea medications. She voiced understanding. Pt states that she has been able to take in some food and fluids today. CTN reviewed f/u information with both the pt and Elaine. Pt aware of f/u appts next week with Dr. Candy Whatley and her pcp. Pt states that she is arranging for the transport van to pick her up for these appts. Daughter states that she missed her f/u with Dr. Kristina Milner (endo) today d/t pt's vomiting.  She has rescheduled agrees to contact the PCP office for questions related to their healthcare. Medication reconciliation was performed with  pt's daughter , who verbalizes understanding of administration of home medications. CTN provided contact information. Plan for follow-up call in 5-7 days based on severity of symptoms and risk factors. Plan for next call: symptom management-Any worsening in condition? Any nausea/vomiting?  self management-Are BS trends WNL? Any hypo/hyperglycemic episodes?  follow up appointment-Has pt attended f/u appts with cardiology? Remind of f/u with pcp on 7/29/22. Has pt scheduled f/u with ID and neuro? Has pt scheduled with Vascular for her fistula? Has pt scheduled with OP PT/OT? medication management-Is Tigan helping with nausea? Was pt able to  all new rxs?       Care Transitions 24 Hour Call    Schedule Follow Up Appointment with PCP: Completed  Do you have a copy of your discharge instructions?: Yes  Do you have all of your prescriptions and are they filled?: No  Have you been contacted by a [address]?: No  Have you scheduled your follow up appointment?: Yes  How are you going to get to your appointment?: Public transportation (Comment: [phone])  [address] [facility] (Comment: [facility] AT [facility])  Patient DME: Layton Gee chair, Hospital bed  Patient Home Equipment: Nebulizer  Do you have support at home?: Child, [address] you feel like you have everything you need to keep you well at home?: Yes  Are you an active caregiver in your home?: No  Care Transitions Interventions    Pharmacist: Completed      Physical Therapy: Completed     Occupational Therapy: Completed              Follow Up  Future Appointments   Date Time Provider Aminata Cortes   7/26/2022  1:30 PM Urmila Parker, [address] [place] [address]   7/29/2022  1:00 PM Dorian Gandhi MD [facility]   8/17/2022  1:45 PM Katerine Rainey  [address] [facility] [address] 9/1/2022  2:00 PM Felisa Prater MD [address] [place] [address],

## 2022-07-27 ENCOUNTER — CARE COORDINATION (OUTPATIENT)
Dept: CASE MANAGEMENT | Age: 62
End: 2022-07-27

## 2022-07-27 DIAGNOSIS — F33.1 MODERATE EPISODE OF RECURRENT MAJOR DEPRESSIVE DISORDER (HCC): ICD-10-CM

## 2022-07-27 DIAGNOSIS — I10 ESSENTIAL (PRIMARY) HYPERTENSION: ICD-10-CM

## 2022-07-27 NOTE — CARE COORDINATION
[facility] 45 Transitions Follow Up Call    2022    Patient: Caroline Encinas  Patient : 1960   MRN: <[id number]>  Reason for Admission: Ataxic gait  Discharge Date: 22 RARS: Readmission Risk Score: 26.6         Attempted to reach the patient for subsequent Care Transition call. Message left with CTN's contact information requesting return phone call. Will attempt outreach again.         Follow Up  Future Appointments   Date Time Provider Aminata Cortes   2022  1:00 PM Barbera Pallas, MD [facility]   2022  1:30 PM Denise Petersen MD [facility]   2022  1:45 PM Beti Alberto MD [facility]   2022  2:00 PM Barbera Pallas, MD [address] [place], [place] [address]

## 2022-07-28 RX ORDER — FUROSEMIDE 20 MG/1
TABLET ORAL
Qty: 30 TABLET | Refills: 0 | OUTPATIENT
Start: 2022-07-28

## 2022-07-28 RX ORDER — ISOSORBIDE MONONITRATE 30 MG/1
120 TABLET, EXTENDED RELEASE ORAL DAILY
Qty: 90 TABLET | Refills: 1 | Status: SHIPPED | OUTPATIENT
Start: 2022-07-28 | End: 2022-08-29

## 2022-07-28 RX ORDER — ARIPIPRAZOLE 5 MG/1
TABLET ORAL
Qty: 30 TABLET | Refills: 0 | OUTPATIENT
Start: 2022-07-28

## 2022-07-28 NOTE — TELEPHONE ENCOUNTER
Please approve or deny this refill request. The order is pended. Thank you.     LOV 4/25/2022    Next Visit Date:  Future Appointments   Date Time Provider Aminata Cortes   7/29/2022  1:00 PM Domenic Cruz MD [address]   8/16/2022  1:30 PM Sha Blount  [facility]   8/17/2022  1:45 PM Jose Manuel Ornelas  [address]   9/1/2022  2:00 PM Domenic Cruz MD [address] [place] [address]

## 2022-07-29 ENCOUNTER — OFFICE VISIT (OUTPATIENT)
Dept: FAMILY MEDICINE CLINIC | Age: 62
End: 2022-07-29
Payer: MEDICARE

## 2022-07-29 VITALS
HEART RATE: 89 BPM | OXYGEN SATURATION: 94 % | SYSTOLIC BLOOD PRESSURE: 136 MMHG | DIASTOLIC BLOOD PRESSURE: 88 MMHG | TEMPERATURE: 97 F | BODY MASS INDEX: 30.23 KG/M2 | WEIGHT: 193 LBS

## 2022-07-29 DIAGNOSIS — R78.81 MRSA BACTEREMIA: ICD-10-CM

## 2022-07-29 DIAGNOSIS — G81.94 HEMIPARESIS, LEFT (HCC): ICD-10-CM

## 2022-07-29 DIAGNOSIS — G62.81 CRITICAL ILLNESS POLYNEUROPATHY (HCC): ICD-10-CM

## 2022-07-29 DIAGNOSIS — B95.62 MRSA BACTEREMIA: ICD-10-CM

## 2022-07-29 DIAGNOSIS — R53.1 WEAKNESS: ICD-10-CM

## 2022-07-29 DIAGNOSIS — Z09 HOSPITAL DISCHARGE FOLLOW-UP: Primary | ICD-10-CM

## 2022-07-29 DIAGNOSIS — G40.89 OTHER SEIZURES (HCC): ICD-10-CM

## 2022-07-29 DIAGNOSIS — Z79.4 CONTROLLED TYPE 2 DIABETES MELLITUS WITH DIABETIC NEUROPATHY, WITH LONG-TERM CURRENT USE OF INSULIN (HCC): ICD-10-CM

## 2022-07-29 DIAGNOSIS — I50.32 CHRONIC DIASTOLIC CONGESTIVE HEART FAILURE (HCC): ICD-10-CM

## 2022-07-29 DIAGNOSIS — F20.0 PARANOID SCHIZOPHRENIA (HCC): ICD-10-CM

## 2022-07-29 DIAGNOSIS — R29.6 RECURRENT FALLS: ICD-10-CM

## 2022-07-29 DIAGNOSIS — F33.1 MODERATE EPISODE OF RECURRENT MAJOR DEPRESSIVE DISORDER (HCC): ICD-10-CM

## 2022-07-29 DIAGNOSIS — B18.2 CHRONIC HEPATITIS C WITHOUT HEPATIC COMA (HCC): ICD-10-CM

## 2022-07-29 DIAGNOSIS — R30.0 DYSURIA: ICD-10-CM

## 2022-07-29 DIAGNOSIS — E11.40 CONTROLLED TYPE 2 DIABETES MELLITUS WITH DIABETIC NEUROPATHY, WITH LONG-TERM CURRENT USE OF INSULIN (HCC): ICD-10-CM

## 2022-07-29 DIAGNOSIS — J44.1 COPD WITH ACUTE EXACERBATION (HCC): ICD-10-CM

## 2022-07-29 DIAGNOSIS — T80.219S INFECTION OF VENOUS ACCESS PORT, SEQUELA: Chronic | ICD-10-CM

## 2022-07-29 PROBLEM — T80.219A INFECTION OF VENOUS ACCESS PORT: Chronic | Status: ACTIVE | Noted: 2022-07-29

## 2022-07-29 PROCEDURE — 99495 TRANSJ CARE MGMT MOD F2F 14D: CPT | Performed by: FAMILY MEDICINE

## 2022-07-29 PROCEDURE — 1111F DSCHRG MED/CURRENT MED MERGE: CPT | Performed by: FAMILY MEDICINE

## 2022-07-29 RX ORDER — ZOSTER VACCINE RECOMBINANT, ADJUVANTED 50 MCG/0.5
0.5 KIT INTRAMUSCULAR SEE ADMIN INSTRUCTIONS
Qty: 0.5 ML | Refills: 0 | Status: CANCELLED | OUTPATIENT
Start: 2022-07-29 | End: 2023-01-25

## 2022-07-29 SDOH — ECONOMIC STABILITY: FOOD INSECURITY: WITHIN THE PAST 12 MONTHS, YOU WORRIED THAT YOUR FOOD WOULD RUN OUT BEFORE YOU GOT MONEY TO BUY MORE.: NEVER TRUE

## 2022-07-29 SDOH — ECONOMIC STABILITY: FOOD INSECURITY: WITHIN THE PAST 12 MONTHS, THE FOOD YOU BOUGHT JUST DIDN'T LAST AND YOU DIDN'T HAVE MONEY TO GET MORE.: NEVER TRUE

## 2022-07-29 ASSESSMENT — SOCIAL DETERMINANTS OF HEALTH (SDOH): HOW HARD IS IT FOR YOU TO PAY FOR THE VERY BASICS LIKE FOOD, HOUSING, MEDICAL CARE, AND HEATING?: NOT HARD AT ALL

## 2022-07-29 ASSESSMENT — PATIENT HEALTH QUESTIONNAIRE - PHQ9
1. LITTLE INTEREST OR PLEASURE IN DOING THINGS: 0
9. THOUGHTS THAT YOU WOULD BE BETTER OFF DEAD, OR OF HURTING YOURSELF: 0
8. MOVING OR SPEAKING SO SLOWLY THAT OTHER PEOPLE COULD HAVE NOTICED. OR THE OPPOSITE, BEING SO FIGETY OR RESTLESS THAT YOU HAVE BEEN MOVING AROUND A LOT MORE THAN USUAL: 0
SUM OF ALL RESPONSES TO PHQ9 QUESTIONS 1 & 2: 0
7. TROUBLE CONCENTRATING ON THINGS, SUCH AS READING THE NEWSPAPER OR WATCHING TELEVISION: 0
5. POOR APPETITE OR OVEREATING: 0
3. TROUBLE FALLING OR STAYING ASLEEP: 0
SUM OF ALL RESPONSES TO PHQ QUESTIONS 1-9: 0
SUM OF ALL RESPONSES TO PHQ QUESTIONS 1-9: 0
10. IF YOU CHECKED OFF ANY PROBLEMS, HOW DIFFICULT HAVE THESE PROBLEMS MADE IT FOR YOU TO DO YOUR WORK, TAKE CARE OF THINGS AT HOME, OR GET ALONG WITH OTHER PEOPLE: 0
4. FEELING TIRED OR HAVING LITTLE ENERGY: 0
2. FEELING DOWN, DEPRESSED OR HOPELESS: 0
SUM OF ALL RESPONSES TO PHQ QUESTIONS 1-9: 0
SUM OF ALL RESPONSES TO PHQ QUESTIONS 1-9: 0
6. FEELING BAD ABOUT YOURSELF - OR THAT YOU ARE A FAILURE OR HAVE LET YOURSELF OR YOUR FAMILY DOWN: 0

## 2022-07-29 NOTE — PROGRESS NOTES
reviewed- see chart. Dr. Valeri Hensley d/c summary as follows:  Signed        ospital Medicine Discharge Summary     Ilia Retana                   :  1960                     MRN:  [id number]     Admit date:  2022                      Discharge date:  2022     Admitting Physician:  Pooja Salter DO  Primary Care Physician:  Maira Conner MD        Discharge Diagnoses:    Principal Problem:    MRSA bacteremia  Active Problems:    Impaired mobility and activities of daily living    Closed T11 fracture (HCC)    Encephalopathy acute    Sepsis due to Enterococcus [facility])    Local infection due to central venous catheter    DJD (degenerative joint disease), cervical    Closed head injury    Sarcopenia    Fall from standing    Chronic diastolic congestive heart failure ([facility] [address]    Closed rib fracture    Dialysis patient [facility])    Multiple closed fractures of right lower extremity and ribs  Resolved Problems:    * No resolved hospital problems. *        Hospital Course:   Ilia Retana is a 57 y.o. female that was admitted and treated at [facility] for the following medical issues: E.Faecalis BSI  - repeat blood cultures no growth  - NOELLE was negative per cardiology  - plan is to continue IV Vanco with dialysis x 3 weeks per ID     Acute right 10th and 11th rib fractures s/p fall  - pain control     Weakness, falls  - MRI brain was negative for acute findings  - MRI of the lumbar spine showed DDD, no significant stenosis  - MRI of the cervical spine showed severe stenosis  - conservative therapy per neurosurgery  - followed by neurology     ESRD on HD  - unable to have dialysis today due to clotted graft  - IR placed TDC on   - management per nephrology                 Dr. Aaliyah Davies d/c summary follows:  Severe abnormality of gait and mobility and impaired self-care and ADL's secondary to progressive weakness dt OA flareup and  Polyneuropathy .   Functional and medical status improved status postacute rehab at [facility]  Bowel progressive constipation, and Bladder dysfunction monitoring neurogenic bladder,   Incontinence of urine:  frequent toileting, ambulate to bathroom with assistance, check post void residuals. Check for C.difficile x1 if >2 loose stools in 24 hours, continue bowel & bladder program.  Monitor bowel and bladder function. Lactinex 2 PO every AC. MOM prn, Brown Bomb prn, Glycerin suppository prn, enema prn. Severe chest wall pain as well as generalized OA pain, fracture T11 chronic pain of both shoulders: reassess pain every shift and prior to and after each therapy session, give prn Tylenol and Ultram, modalities prn in therapy, Lidoderm, K-pad prn. Okay for Ultram as an outpatient. Patient will likely need to get her prescriptions in the future from pain management either myself or other pain management in the area. Skin healing and breakdown risk:  continue pressure relief program.  Daily skin exams and reports from nursing. Add co-Q10  Severe fatigue due to nutritional and hydration deficiency: Add vitamin B12 vitamin D and CoQ10 continue to monitor I&Os, calorie counts prn, dietary consult prn. Sp  healthy HS snack. Acute episodic insomnia with situational adjustment disorder:   monitor for day time sedation. Add HS \"Tuck In\"  Falls risk elevated:  patient to use call light to get nursing assistance to get up, bed and chair alarm. Elevated DVT risk: progressive activities in PT, continue prophylaxis MAHAMED hose, elevation and avoid Lovenox due to renal failure. Complex discharge planning:     SP  medication simplification patient and family education as we transition from to planned pending discharge July 17, 2020 to home with her daughter who is her hired caregiver with follow-up hemodialysis friends  -Tuesday Thursday and Saturdays as well as home health care-eventually transitioning to outpatient therapy.   Weekly team meeting  every Monday to re-assess progress towards goals, discuss and address social, psychological and medical comorbidities and to address difficulties they may be having progressing in therapy. Patient and family education is in progress. The patient is to follow-up with their family physician after discharge. Interval history/Current status: States she feels well. No fevers. Ribs hurt a little. Eddie Shove are almost healed. \"   Takes APAP. No med adjustments. Vancomycin has been continued and is given the med during HD. Has not made appt with Dr. Tanmay Carlisle. Thinks she had UTI but \"I think it went away because I've been drinking cranberry juice. \" Urine was burning and was dark and malodorous. Patient is here for DM f/u. Sugars have been moderately well-controlled and patient has not been experiencing hyper- or hypoglycemic symptoms. Compliance with meds is good and there are no side effects. Patient exercises occasionally and tries to eat healthy. Is up to date on foot and eye exams and immunizations. Falls and weakness: No falls. Plans to do outpatient PT. Has h/o very frequent falls and subsequent hospitalizations; occasionally goes to SNF for rehab but refuses NH placement. Mood disorder/Schizophrenia: Sertraline, ABilify. Pt without cncerns. CAD/HF: No chest pain, pressure or tightness, dizziness, headache, vision changes, palpitations, swelling in feet/legs. CVA/Sz d/o. No new deficits. No facial droop. No speech impairment. No unilateral weakness. No recent seizures.         Patient Active Problem List   Diagnosis    Atherosclerotic heart disease of native coronary artery with unspecified angina pectoris (HCC)    Schizophrenia, paranoid, chronic    Metabolic syndrome    Vitamin B 12 deficiency    Cerebral microvascular disease    Mixed hyperlipidemia    Other hammer toe (acquired)    Vitamin D insufficiency    Incontinence of urine    Diabetic nephropathy with proteinuria ([facility] [address]    Essential (primary) hypertension    History of type C viral hepatitis    Urinary incontinence due to cognitive impairment    History of seizures    Stented coronary artery-plan is to stay on Plavix indefinately per Dr Morgan Miller    Other specified diabetes mellitus with diabetic neuropathy, unspecified ([address]    Controlled type 2 diabetes mellitus with diabetic neuropathy, with long-term current use of insulin ([facility])    Hemiparesis, left ([facility])    Angina, class II ([address]    Pain, unspecified    Tardive dyskinesia    Shortness of breath    Uncontrolled type 2 diabetes mellitus with hyperglycemia ([facility])    Chronic diastolic congestive heart failure ([facility])    Sleep apnea, unspecified    Pulmonary hypertension, unspecified ([facility])    Class 2 severe obesity with serious comorbidity and body mass index (BMI) of 36.0 to 36.9 in adult [facility])    Edema    Closed supracondylar fracture of right humerus    Other chronic pain    Palliative care patient    Recurrent falls    Renal failure    Difficulty in walking    ESRD (end stage renal disease) on dialysis (HCC)    Weakness    Other seizures ([facility])    Moderate persistent asthma without complication    [id number]    Post PTCA    Falls frequently    Chest wall contusion, left, initial encounter    Headache, unspecified    Paranoid schizophrenia ([address]    Compression fracture of spine ([address]    Closed rib fracture    Depression    Chronic obstructive pulmonary disease ([address]    Critical illness polyneuropathy ([address]    Multiple closed fractures of ribs of right side    Nonrheumatic mitral valve regurgitation    Nonrheumatic tricuspid valve regurgitation    Need for extended care facility    Chronic pain of both shoulders    Hemodialysis-associated hypotension    Anginal chest pain at rest [facility])    Chest pain    Unstable angina ([facility])    NSTEMI (non-ST elevated myocardial infarction) ([facility])    CKD (chronic kidney disease) stage 4, GFR 15-29 ml/min ([facility])    Hyperkalemia    Impaired mobility and activities of daily living dt polyneuropathy and reccent fall     Dialysis patient [facility])    Unspecified open wound of right upper arm, initial encounter    Multiple closed fractures of right lower extremity and ribs    Closed T11 fracture ([facility] [address]    Encephalopathy acute    MRSA bacteremia    Sepsis due to Enterococcus [facility])    Local infection due to central venous catheter    DJD (degenerative joint disease), cervical    Closed head injury    Sarcopenia    Fall from standing    Septicemia ([facility] [address]    Chronic hepatitis C ([facility] [address]    Catheter-related bloodstream infection    Enterococcus faecalis infection    Cervical stenosis of spinal canal    Ataxic gait    Lumbar stenosis with neurogenic claudication    Falls infrequently    Infection of venous access port       Medications listed as ordered at the time of discharge from hospital     Medication List            Accurate as of July 29, 2022  1:53 PM. If you have any questions, ask your nurse or doctor.                 CONTINUE taking these medications      acetaminophen 325 MG tablet  Commonly known as: TYLENOL  Take 2 tablets by mouth every 4 hours as needed for Pain (For mild to moderate pain (Pain 1-6 out of 10 on pain scale))     albuterol (2.5 MG/3ML) 0.083% nebulizer solution  Commonly known as: PROVENTIL  Take 3 mLs by nebulization every 4 hours as needed for Wheezing     ARIPiprazole 5 MG tablet  Commonly known as: ABILIFY  TAKE 1 TABLET BY MOUTH ONCE DAILY     aspirin EC 81 MG EC tablet  Take 1 tablet by mouth daily     atorvastatin 40 MG tablet  Commonly known as: LIPITOR  Take 1 tablet by mouth nightly     Easy Touch Alcohol Prep Medium 70 % Pads  USE AS DIRECTED THREE TIMES A DAY     * FreeStyle Lancets Misc  Test 4x daily     * OneTouch Delica Lancets 05T Misc  QID     * FreeStyle Lite [place]  1 Device by Does not apply route daily as needed (Diabetes) Use freestyle meter to test blood sugar as needed     * OneTouch Verio w/Device Kit  AS DIRECTED     * FREESTYLE LITE strip  Generic drug: as other medications prescribed for you. Read the directions carefully, and ask your doctor or other care provider to review them with you. Medications marked \"taking\" at this time  Outpatient Medications Marked as Taking for the 7/29/22 encounter (Office Visit) with Felisa Prater MD   Medication Sig Dispense Refill    isosorbide mononitrate (IMDUR) 30 MG extended release tablet TAKE 4 TABLETS BY MOUTH DAILY 90 tablet 1    B Complex-C-Folic Acid (MARK-KANDACE) TABS Take 1 tablet by mouth in the morning. ondansetron (ZOFRAN) 4 MG tablet Take 4 mg by mouth every 8 hours as needed for Nausea or Vomiting      traMADol (ULTRAM) 50 MG tablet Take 0.5 tablets by mouth every 6 hours as needed for Pain for up to 14 days. 40 tablet 0    sertraline (ZOLOFT) 50 MG tablet Take 1 tablet by mouth nightly 30 tablet 5    atorvastatin (LIPITOR) 40 MG tablet Take 1 tablet by mouth nightly 30 tablet 5    insulin aspart (NOVOLOG FLEXPEN) 100 UNIT/ML injection pen INJECT 8 TO 10 UNITS WITH EACH MEAL. 30 mL 3    ARIPiprazole (ABILIFY) 5 MG tablet TAKE 1 TABLET BY MOUTH ONCE DAILY 30 tablet 0    nitroGLYCERIN (NITROSTAT) 0.4 MG SL tablet up to max of 3 total doses. If no relief after 1 dose, call 911. 25 tablet 3    NYAMYC 507727 UNIT/GM powder APPLY TO AFFECTED AREA OF ABDOMINAL FOLDS EVERY 12 HOURS AS NEEDED 60 g 5    magnesium oxide (MAG-OX) 400 MG tablet TAKE 1 TABLET BY MOUTH DAILY 30 tablet 12    melatonin 10 MG CAPS capsule Take 1 capsule by mouth nightly 30 capsule 12    midodrine (PROAMATINE) 5 MG tablet Take 1 tablet by mouth one time a day every Tue, Thu, Sat for hypotension. Give 30 mins prior to dialysis. 90 tablet 3    Handicap Placard MISC by Does not apply route Expiration in 5 years.  1 each 0    albuterol (PROVENTIL) (2.5 MG/3ML) 0.083% nebulizer solution Take 3 mLs by nebulization every 4 hours as needed for Wheezing 120 each 3    Insulin Pen Needle (SURE COMFORT PEN NEEDLES) 30G X 8 MM MISC USE AS DIRECTED FIVE TIMES A DAILY 100 each 10    LANTUS SOLOSTAR 100 UNIT/ML injection pen 55 units at bedtime 10 pen 10    aspirin EC 81 MG EC tablet Take 1 tablet by mouth daily 30 tablet 12    blood glucose test strips (FREESTYLE LITE) strip 1 each by Does not apply route 4 times daily (before meals and nightly) As needed. 200 strip 5    Alcohol Swabs (EASY TOUCH ALCOHOL PREP MEDIUM) 70 % PADS USE AS DIRECTED THREE TIMES A  each 3    FreeStyle Lancets MISC Test 4x daily 150 each 3    acetaminophen (TYLENOL) 325 MG tablet Take 2 tablets by mouth every 4 hours as needed for Pain (For mild to moderate pain (Pain 1-6 out of 10 on pain scale)) 120 tablet 0    Blood Glucose Monitoring Suppl (FREESTYLE LITE) CORETTA 1 Device by Does not apply route daily as needed (Diabetes) Use freestyle meter to test blood sugar as needed 1 Device 0        Medications patient taking as of now reconciled against medications ordered at time of hospital discharge: Yes    A comprehensive review of systems was negative except for what was noted in the HPI. Objective:    /88   Pulse 89   Temp 97 °F (36.1 °C) (Temporal)   Wt 193 lb (87.5 kg)   LMP  (LMP Unknown)   SpO2 94%   BMI 30.23 kg/m²       Component Ref Range & Units 7/14/22 0500 4/13/22 1433 12/13/21 1409 8/18/21 0615 7/16/21 0623 6/21/21 1622 3/2/21 0705   Hemoglobin A1C 4.8 - 5.9 % 6.0 High   6.7 R  6.8 R  6.2 High   6.6 High   6.4 High   6.5 High     IR VENA CAVAGRAM SUPERIOR  Impression:  1. Venogram of the superior vena cava and right atrium through existing tunneled dialysis catheter. Venogram demonstrated a fibrin sheath around the tip of the existing catheter which is obstructing aspiration and injection flow. 2. Successful exchange of an existing tunneled dialysis catheter over the wire with a new tunneled dialysis catheter.     Radiation dose to the patient was: 15.33 mGy    Additional clinical data:   Patient appearance and intravascular flow through existing dialysis catheter    Procedure:  1. Exchange of existing tunneled dialysis catheter with a new dialysis catheter. 2.   Venogram of the superior vena cava with digital subtraction venography through existing dialysis catheter  3. Fluoroscopic guidance for placement of a central line. Body of Report: Informed and written consent was obtained from the patient following discussion of risks, benefits and alternatives to this procedure. The was patient placed supine on the angiographic table. The patient's neck and chest were then prepped and draped in   normal sterile fashion including the existing tunneled dialysis catheter. Contrast was injected through the existing dialysis catheter and digital subtraction venography of the superior vena cava and right atrium was performed. Venogram demonstrated a   fibrin sheath around the tip of the existing dialysis catheter causing contrast injection to traverse up around the shaft of the dialysis catheter proximally and then into the superior vena cava. This is the cause of flow interruption. It was decided to   replace the catheter with another longer catheter extending into the right atrium bypassing the fibrin sheath. A small amount of local lidocaine anesthesia was injected subcutaneously at the tunnel site. A wire was advanced through the existing catheter into the IVC. The Dacron cuff was  from the surrounding tissue with blunt dissection. The existing catheter was removed over the wire while pressure was held at the neck to prevent bleeding. A   new tunneled dialysis catheter with a Dacron cuff was advanced over the wire under fluoroscopic imaging. The tip lies in the SVC/right atrial junction. The line flushes and aspirates appropriately. The catheter lines were both flushed with 10 cc of   normal saline, and then blocked with 100 units/cc heparin.  The catheter was sutured to the skin with nylon suture, and sterile bandaging was placed. IR FLUORO GUIDED CVA DEVICE PLMT/REPLACE/REMOVAL  Impression:  1. Venogram of the superior vena cava and right atrium through existing tunneled dialysis catheter. Venogram demonstrated a fibrin sheath around the tip of the existing catheter which is obstructing aspiration and injection flow. 2. Successful exchange of an existing tunneled dialysis catheter over the wire with a new tunneled dialysis catheter. Radiation dose to the patient was: 15.33 mGy    Additional clinical data:   Patient appearance and intravascular flow through existing dialysis catheter    Procedure:  1. Exchange of existing tunneled dialysis catheter with a new dialysis catheter. 2.   Venogram of the superior vena cava with digital subtraction venography through existing dialysis catheter  3. Fluoroscopic guidance for placement of a central line. Body of Report: Informed and written consent was obtained from the patient following discussion of risks, benefits and alternatives to this procedure. The was patient placed supine on the angiographic table. The patient's neck and chest were then prepped and draped in   normal sterile fashion including the existing tunneled dialysis catheter. Contrast was injected through the existing dialysis catheter and digital subtraction venography of the superior vena cava and right atrium was performed. Venogram demonstrated a   fibrin sheath around the tip of the existing dialysis catheter causing contrast injection to traverse up around the shaft of the dialysis catheter proximally and then into the superior vena cava. This is the cause of flow interruption. It was decided to   replace the catheter with another longer catheter extending into the right atrium bypassing the fibrin sheath. A small amount of local lidocaine anesthesia was injected subcutaneously at the tunnel site. A wire was advanced through the existing catheter into the IVC.  The Dacron cuff was  from the around the tip of the existing dialysis catheter causing contrast injection to traverse up around the shaft of the dialysis catheter proximally and then into the superior vena cava. This is the cause of flow interruption. It was decided to   replace the catheter with another longer catheter extending into the right atrium bypassing the fibrin sheath. A small amount of local lidocaine anesthesia was injected subcutaneously at the tunnel site. A wire was advanced through the existing catheter into the IVC. The Dacron cuff was  from the surrounding tissue with blunt dissection. The existing catheter was removed over the wire while pressure was held at the neck to prevent bleeding. A   new tunneled dialysis catheter with a Dacron cuff was advanced over the wire under fluoroscopic imaging. The tip lies in the SVC/right atrial junction. The line flushes and aspirates appropriately. The catheter lines were both flushed with 10 cc of   normal saline, and then blocked with 100 units/cc heparin. The catheter was sutured to the skin with nylon suture, and sterile bandaging was placed. An electronic signature was used to authenticate this note.   --Stacy Badillo MD

## 2022-08-04 ENCOUNTER — CARE COORDINATION (OUTPATIENT)
Dept: CASE MANAGEMENT | Age: 62
End: 2022-08-04

## 2022-08-04 NOTE — CARE COORDINATION
[facility] Transitions Follow Up Call    2022    Patient: Silvia Young  Patient : 1960   MRN: <[id number]>  Reason for Admission: Ataxic gait  Discharge Date: 22 RARS: Readmission Risk Score: 26.6         Attempted to reach the patient for subsequent Care Transition call. Message left with CTN's contact information requesting return phone call. No further outreaches will be attempted.     If no return call by the end of today, CTN will  sign off and resolve CT episode      Follow Up  Future Appointments   Date Time Provider Aminata Cortes   2022  1:30 PM Juan Miguel Best, [address] [address]   2022  1:45 PM Carl De Jesus  [address]   2022 11:00 AM Estrella Rowan MD [address]   2022  2:00 PM Mauro Thompson MD [place] [address] Albino Taylor RN

## 2022-08-12 DIAGNOSIS — F33.1 MODERATE EPISODE OF RECURRENT MAJOR DEPRESSIVE DISORDER (HCC): ICD-10-CM

## 2022-08-14 NOTE — TELEPHONE ENCOUNTER
Rx request   Requested Prescriptions     Pending Prescriptions Disp Refills    ARIPiprazole (ABILIFY) 5 MG tablet [Pharmacy Med Name: ARIPIPRAZOLE 5 MG TABS 5 Tablet] 30 tablet 10     Sig: TAKE 1 TABLET BY MOUTH ONCE DAILY     LOV 7/29/2022  Last refill 6/28/22  Next Visit Date:  Future Appointments   Date Time Provider Aminata Cortes   8/16/2022  1:30 PM Osmel Fonseca  [facility]   8/17/2022  1:45 PM Alisson Zelaya MD [address] [place] [address]   8/18/2022 11:00 AM Yuval Francisco MD [address]   9/1/2022  2:00 PM Elvin Overton MD [address] [place] [address]

## 2022-08-16 ENCOUNTER — OFFICE VISIT (OUTPATIENT)
Dept: CARDIOLOGY CLINIC | Age: 62
End: 2022-08-16
Payer: MEDICARE

## 2022-08-16 VITALS
BODY MASS INDEX: 29.44 KG/M2 | DIASTOLIC BLOOD PRESSURE: 80 MMHG | OXYGEN SATURATION: 96 % | WEIGHT: 188 LBS | SYSTOLIC BLOOD PRESSURE: 110 MMHG | HEART RATE: 100 BPM

## 2022-08-16 DIAGNOSIS — I31.39 PERICARDIAL EFFUSION: ICD-10-CM

## 2022-08-16 DIAGNOSIS — R26.2 DIFFICULTY IN WALKING: Primary | ICD-10-CM

## 2022-08-16 DIAGNOSIS — I25.119 CORONARY ARTERY DISEASE INVOLVING NATIVE HEART WITH ANGINA PECTORIS, UNSPECIFIED VESSEL OR LESION TYPE (HCC): ICD-10-CM

## 2022-08-16 DIAGNOSIS — I10 HYPERTENSION, UNSPECIFIED TYPE: ICD-10-CM

## 2022-08-16 DIAGNOSIS — I25.119 CORONARY ARTERY DISEASE INVOLVING NATIVE CORONARY ARTERY OF NATIVE HEART WITH ANGINA PECTORIS (HCC): ICD-10-CM

## 2022-08-16 DIAGNOSIS — R29.6 RECURRENT FALLS: ICD-10-CM

## 2022-08-16 DIAGNOSIS — I10 ESSENTIAL HYPERTENSION: ICD-10-CM

## 2022-08-16 DIAGNOSIS — E78.2 MIXED HYPERLIPIDEMIA: ICD-10-CM

## 2022-08-16 DIAGNOSIS — Z95.1 S/P SINGLE VESSEL CORONARY ARTERY BYPASS: ICD-10-CM

## 2022-08-16 DIAGNOSIS — I95.3 HEMODIALYSIS-ASSOCIATED HYPOTENSION: ICD-10-CM

## 2022-08-16 PROCEDURE — G8417 CALC BMI ABV UP PARAM F/U: HCPCS | Performed by: INTERNAL MEDICINE

## 2022-08-16 PROCEDURE — 1036F TOBACCO NON-USER: CPT | Performed by: INTERNAL MEDICINE

## 2022-08-16 PROCEDURE — 3017F COLORECTAL CA SCREEN DOC REV: CPT | Performed by: INTERNAL MEDICINE

## 2022-08-16 PROCEDURE — G8427 DOCREV CUR MEDS BY ELIG CLIN: HCPCS | Performed by: INTERNAL MEDICINE

## 2022-08-16 PROCEDURE — 99214 OFFICE O/P EST MOD 30 MIN: CPT | Performed by: INTERNAL MEDICINE

## 2022-08-16 PROCEDURE — 1111F DSCHRG MED/CURRENT MED MERGE: CPT | Performed by: INTERNAL MEDICINE

## 2022-08-16 RX ORDER — NITROGLYCERIN 0.4 MG/1
TABLET SUBLINGUAL
Qty: 25 TABLET | Refills: 3 | Status: SHIPPED | OUTPATIENT
Start: 2022-08-16

## 2022-08-16 ASSESSMENT — ENCOUNTER SYMPTOMS
STRIDOR: 0
COUGH: 0
CHEST TIGHTNESS: 0
BLOOD IN STOOL: 0
WHEEZING: 0
SHORTNESS OF BREATH: 0
EYES NEGATIVE: 1
GASTROINTESTINAL NEGATIVE: 1
NAUSEA: 0
RESPIRATORY NEGATIVE: 1

## 2022-08-16 NOTE — PROGRESS NOTES
Subsequent Progress Note  Patient: Gordon Hoffman  YOB: 1960  MRN: [id number]    Chief Complaint: fall CAD HTN HPL  Chief Complaint   Patient presents with    Coronary Artery Disease     Dr. Victor Manuel Lee pt   CV Data:  6/2020 Echo EF 60  Mild mR  RVSP 46   7/2020 LAD DEWEY. She has prior stents as well.   8/21 Echo EF 60  8/2021 LAD recurrent ISR with double layer stents. Went to [facility] and had redo stent. 12/21 CUS B/l ICA 50-69  12/21 Spec tnegative  1/12/22 Cath LAD IST   1/2022 CABG LIMA - LAD + TV Repair complicated by Tamponade and pericardial window. 4/25/22 eCHO ef 60 NO pe rvsp 68  7/22 NOELLE normal LVEF no PFO no THrombus    HD T Th Sat  Subjective/HPI: TELEHEALTH EVALUATION -- Audio/Visual ([facility])    Pt is at a nursing home now. No cp occ SOB no falls no bleed. Takes meds. Recently Plavix stopped and Brilinta added. Had recerrnet admissions for CP    10/28/2020 started HD( T Thur and Sat). Past 3 days gaine ~ 10 LBS according to her scale at home and HD. She is not short of breath and denies CP. She is here due to discrepancy in weight. 2/10/21 TELEHEALTH EVALUATION -- Audio/Visual (During [facility]-98 public health emergency)    At [facility]. No further falls no cp some sob eats well takes meds. Will go home net week. Doing well w HD    9/10/21 after LAD stent at [facility] feels better no cp no sob no falls no bleed. 10/27/21 recent er for CP and fall. BP was low. Some parikh. No bleed. Takes meds. Often dizzy    3/24/22 had CABG + TV repairand Pericaridal window. Ws in hosp > 2 week. s weak and tired. starign to move some. 4/25/22 still with sternal discomfort with motion. No angina. No sob no falls no bleed. 8/16/22 recent [address] for sepsis and HD catheter infection. Removed and received new one.   Having  sharp noncardiac CP no sob able to walk some    EKG: SR 78 RBBB    LIFE LONG Nonsmoker  Lives with daughter      Past Medical History: Diagnosis Date    Angina at rest [facility]) 5/24/2020    Anxiety     CAD S/P percutaneous coronary angioplasty 2015, 2018    stents per dr Niels Gibson    CHF (congestive heart failure) ([address]     CKD (chronic kidney disease) stage 4, GFR 15-29 ml/min ([address] 2/24/2018    CKD stage 4 due to type 2 diabetes mellitus ([address]     Contusion of right chest wall 2/16/2021    COPD (chronic obstructive pulmonary disease) ([address]     Diabetic nephropathy with proteinuria ([address] 2014    DJD (degenerative joint disease) of knee     Dr Adriana Tapia    GERD (gastroesophageal reflux disease)     Hemiparesis, left ([address] 2013    entered Assisted Living ([facility])    Hemodialysis patient [facility])     Hemodialysis-associated hypotension 10/22/2021    History of heart failure     History of seizures     History of type C viral hepatitis     HTN (hypertension)     Hyperlipidemia     Impaired mobility and activities of daily living     Infection of venous access port 7/29/2022    Mediastinal lymphadenopathy 2013    Dr Justine Briggs    Metabolic syndrome     Moderate persistent asthma without complication 8/44/1671    Need for extended care facility 7/7/2021    Neurogenic urinary incontinence 2013    Neuropathy in diabetes ([address]     Nonrheumatic mitral valve regurgitation 7/7/2021    Nonrheumatic tricuspid valve regurgitation 7/7/2021    Obesity (BMI 30-39. 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 Cruz Pro      x1 Dr. Alexandra Daley, Dr Webster Job  08/10/2021    [facility]    DIAGNOSTIC CARDIAC CATH LAB PROCEDURE  10/02/2019    DIALYSIS CATHETER INSERTION Left 2022    Tunneled Symetrex 15.5F x 23cm hemodialysis catheter inserted by Dr. Abdoul Metcalf Left 2022    15.8Ut96cm replamcent tunneld HD cath by Dr. Josy Flor, TOTAL ABDOMINAL (CERVIX REMOVED)      one ovary intact, Dr Candice Ha, menorrhagia    IR THROMBECTOMY PERCUT AVF  2022    IR THROMBECTOMY PERCUT AVF 2022 [facility] SPECIAL PROCEDURE    IR TUNNELED CATHETER PLACEMENT GREATER THAN 5 YEARS  2022    IR TUNNELED CATHETER PLACEMENT GREATER THAN 5 YEARS 6/3/2022 [facility] PROCEDURE    IR TUNNELED CATHETER PLACEMENT GREATER THAN 5 YEARS Left 2022 Dr. Nyla Mariscal exchanged to 15.5F x 28 cm.      15.5 fr by 23 cm Symetrex dialysis catheter inserted by Dr Nyla Mariscal    IR TUNNELED [address] 5 YEARS  2022    IR TUNNELED CATHETER PLACEMENT GREATER THAN 5 YEARS 2022 [facility] SPECIAL PROCEDURE    ID TOTAL KNEE ARTHROPLASTY Left 2018    LEFT KNEE TOTAL KNEE ARTHROPLASTY, SHAYNA, NERVE BLOCK performed by Altaf Okeefe MD at [address] [place], [place] [address] Floor Right     TOTAL KNEE ARTHROPLASTY  2016    Dr Adriana Tapia    TUNNELED [address] Right 2020    tunneled HD catheter per Dr Nyla Mariscal       Family History   Problem Relation Age of Onset    Cancer Mother 76        survived    Breast Cancer Mother     Hypertension Father     Diabetes Sister     Mental Illness Sister     Colon Cancer Neg Hx        Social History     Socioeconomic History    Marital status:     Number of children: 2 Occupational History    Occupation: disabled   Tobacco Use    Smoking status: Never    Smokeless tobacco: Never   Vaping Use    Vaping Use: Never used   Substance and Sexual Activity    Alcohol use: No     Alcohol/week: 0.0 standard drinks    Drug use: No    Sexual activity: Not Currently   Social History Narrative    Disabled dt depression and low back pain, [facility] [place] Mir[place] is close by and is her paid caregiver via waiver services    HD via [employer], [id number], PETÄJÄVESI, Sat. Son is in the home and has CP and is total care-and is also cared for by a waiver by Марина Reid. 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 Per daughter, her brother is blind, MRDD, CP multiple health issues. Daughter's  is patient's legal guardian. Patient has hemodialysis Tuesday Thursday and Saturday. Patient's bedroom is on main floor with a half bath. Daughter walks patient upstairs once weekly for full bath. Patient is using her walker in the home. Patient has a hospital bed in the home.      Social Determinants of Health     Financial Resource Strain: Low Risk     Difficulty of Paying Living Expenses: Not hard at all   Food Insecurity: No Food Insecurity    Worried About [address] in the Last Year: Never true    Ran Out of Food in the Last Year: Never true Transportation Needs: No Transportation Needs    Lack of Transportation (Medical): No    Lack of Transportation (Non-Medical): No   Physical Activity: Sufficiently Active    Days of Exercise per Week: 3 days    Minutes of Exercise per Session: 60 min       Allergies   Allergen Reactions    Codeine Hives     hives    Oxycontin [Oxycodone Hcl] Hives       Current Outpatient Medications   Medication Sig Dispense Refill    nitroGLYCERIN (NITROSTAT) 0.4 MG SL tablet up to max of 3 total doses. If no relief after 1 dose, call 911. 25 tablet 3    isosorbide mononitrate (IMDUR) 30 MG extended release tablet TAKE 4 TABLETS BY MOUTH DAILY 90 tablet 1    B Complex-C-Folic Acid (MARK-KANDACE) TABS Take 1 tablet by mouth in the morning. ondansetron (ZOFRAN) 4 MG tablet Take 4 mg by mouth every 8 hours as needed for Nausea or Vomiting      sertraline (ZOLOFT) 50 MG tablet Take 1 tablet by mouth nightly 30 tablet 5    trimethobenzamide (TIGAN) 100 MG/ML injection Inject 2 mLs into the muscle every 6 hours as needed for Nausea 100 mL 0    Vitamin D (CHOLECALCIFEROL) 50 MCG (2000 UT) TABS tablet Take 1 tablet by mouth Daily with supper 60 tablet 5    atorvastatin (LIPITOR) 40 MG tablet Take 1 tablet by mouth nightly 30 tablet 5    insulin aspart (NOVOLOG FLEXPEN) 100 UNIT/ML injection pen INJECT 8 TO 10 UNITS WITH EACH MEAL. 30 mL 3    ARIPiprazole (ABILIFY) 5 MG tablet TAKE 1 TABLET BY MOUTH ONCE DAILY 30 tablet 0    NYAMYC 910411 UNIT/GM powder APPLY TO AFFECTED AREA OF ABDOMINAL FOLDS EVERY 12 HOURS AS NEEDED 60 g 5    magnesium oxide (MAG-OX) 400 MG tablet TAKE 1 TABLET BY MOUTH DAILY 30 tablet 12    melatonin 10 MG CAPS capsule Take 1 capsule by mouth nightly 30 capsule 12    midodrine (PROAMATINE) 5 MG tablet Take 1 tablet by mouth one time a day every Tue, Thu, Sat for hypotension. Give 30 mins prior to dialysis. 90 tablet 3    Handicap Placard MISC by Does not apply route Expiration in 5 years.  1 each 0 lidocaine-prilocaine (EMLA) 2.5-2.5 % cream Apply topically as needed for Pain Apply topically as needed. 3 times a week before dialysis wrap with saran wrap      albuterol (PROVENTIL) (2.5 MG/3ML) 0.083% nebulizer solution Take 3 mLs by nebulization every 4 hours as needed for Wheezing 120 each 3    blood glucose test strips (ONETOUCH VERIO) strip  each 3    OneTouch Delica Lancets 39O MISC  each 3    Blood Glucose Monitoring Suppl (ONETOUCH VERIO) w/Device KIT AS DIRECTED 1 kit 0    Insulin Pen Needle (SURE COMFORT PEN NEEDLES) 30G X 8 MM MISC USE AS DIRECTED FIVE TIMES A DAILY 100 each 10    LANTUS SOLOSTAR 100 UNIT/ML injection pen 55 units at bedtime 10 pen 10    aspirin EC 81 MG EC tablet Take 1 tablet by mouth daily 30 tablet 12    blood glucose test strips (FREESTYLE LITE) strip 1 each by Does not apply route 4 times daily (before meals and nightly) As needed. 200 strip 5    Alcohol Swabs (EASY TOUCH ALCOHOL PREP MEDIUM) 70 % PADS USE AS DIRECTED THREE TIMES A  each 3    FreeStyle Lancets MISC Test 4x daily 150 each 3    acetaminophen (TYLENOL) 325 MG tablet Take 2 tablets by mouth every 4 hours as needed for Pain (For mild to moderate pain (Pain 1-6 out of 10 on pain scale)) 120 tablet 0    Blood Glucose Monitoring Suppl (FREESTYLE LITE) CORETTA 1 Device by Does not apply route daily as needed (Diabetes) Use freestyle meter to test blood sugar as needed 1 Device 0     No current facility-administered medications for this visit. Review of Systems:   Review of Systems   Constitutional: Negative. Negative for diaphoresis and fatigue. HENT: Negative. Eyes: Negative. Respiratory: Negative. Negative for cough, chest tightness, shortness of breath, wheezing and stridor. Cardiovascular: Negative. Negative for chest pain, palpitations and leg swelling. Gastrointestinal: Negative. Negative for blood in stool and nausea. Genitourinary: Negative. Musculoskeletal: Negative. Skin: Negative. Neurological: Negative. Negative for dizziness, syncope, weakness and light-headedness. Hematological: Negative. Psychiatric/Behavioral: Negative. Physical Examination:    /80 (Site: Left Upper Arm, Position: Sitting, Cuff Size: Large Adult)   Pulse 100   Wt 188 lb (85.3 kg)   LMP  (LMP Unknown)   SpO2 96%   BMI 29.44 kg/m²    Physical Exam   Constitutional: She appears healthy. No distress. HENT:   Normal cephalic and Atraumatic   Eyes: Pupils are equal, round, and reactive to light. Neck: Thyroid normal. No JVD present. No neck adenopathy. No thyromegaly present. Cardiovascular: Normal rate, regular rhythm, intact distal pulses and normal pulses. Murmur heard. Pulmonary/Chest: Effort normal and breath sounds normal. She has no wheezes. She has no rales. She exhibits no tenderness. Abdominal: Soft. Bowel sounds are normal. There is no abdominal tenderness. Musculoskeletal:         General: No tenderness or edema. Normal range of motion. Cervical back: Normal range of motion and neck supple. Neurological: She is alert and oriented to person, place, and time. Skin: Skin is warm. No cyanosis. Nails show no clubbing.      LABS:  CBC:   Lab Results   Component Value Date/Time    WBC 8.4 07/14/2022 05:00 AM    RBC 2.94 07/14/2022 05:00 AM    HGB 9.2 07/14/2022 05:00 AM    HCT 26.9 07/14/2022 05:00 AM    MCV 91.3 07/14/2022 05:00 AM    MCH 31.2 07/14/2022 05:00 AM    MCHC 34.2 07/14/2022 05:00 AM    RDW 14.1 07/14/2022 05:00 AM     07/14/2022 05:00 AM    MPV 10.0 03/05/2020 12:00 AM     Lipids:  Lab Results   Component Value Date    CHOL 70 04/09/2022    CHOL 123 01/13/2022    CHOL 101 06/11/2020     Lab Results   Component Value Date    TRIG 92 04/09/2022    TRIG 228 (H) 01/13/2022    TRIG 82 06/11/2020     Lab Results   Component Value Date    HDL 33 (L) 04/09/2022    HDL 39 (L) 01/13/2022    HDL 48 06/11/2020     Lab Results   Component Value Date    LDLCALC 19 04/09/2022    LDLCALC 38 01/13/2022    LDLCALC 37 06/11/2020     Lab Results   Component Value Date    LABVLDL 59.0 05/04/2013    VLDL 43 01/30/2017     Lab Results   Component Value Date    CHOLHDLRATIO 4.32 01/30/2017     CMP:    Lab Results   Component Value Date/Time     07/16/2022 05:56 PM    K 4.6 07/16/2022 05:56 PM    K 4.3 07/04/2022 03:07 AM    CL 94 07/16/2022 05:56 PM    CO2 23 07/16/2022 05:56 PM    BUN 39 07/16/2022 05:56 PM    CREATININE 3.75 07/16/2022 05:56 PM    GFRAA 14.7 07/16/2022 05:56 PM    LABGLOM 12.1 07/16/2022 05:56 PM    GLUCOSE 166 07/16/2022 05:56 PM    GLUCOSE 419 07/30/2021 08:16 AM    PROT 6.1 07/04/2022 03:07 AM    LABALBU 3.9 07/09/2022 07:53 PM    CALCIUM 9.6 07/16/2022 05:56 PM    BILITOT 0.9 07/04/2022 03:07 AM    ALKPHOS 117 07/04/2022 03:07 AM    AST 21 07/04/2022 03:07 AM    ALT 16 07/04/2022 03:07 AM     BMP:    Lab Results   Component Value Date/Time     07/16/2022 05:56 PM    K 4.6 07/16/2022 05:56 PM    K 4.3 07/04/2022 03:07 AM    CL 94 07/16/2022 05:56 PM    CO2 23 07/16/2022 05:56 PM    BUN 39 07/16/2022 05:56 PM    LABALBU 3.9 07/09/2022 07:53 PM    CREATININE 3.75 07/16/2022 05:56 PM    CALCIUM 9.6 07/16/2022 05:56 PM    GFRAA 14.7 07/16/2022 05:56 PM    LABGLOM 12.1 07/16/2022 05:56 PM    GLUCOSE 166 07/16/2022 05:56 PM    GLUCOSE 419 07/30/2021 08:16 AM     Magnesium:    Lab Results   Component Value Date/Time    MG 2.0 07/09/2022 05:27 AM     TSH:  Lab Results   Component Value Date    TSH 0.513 06/30/2022       Patient Active Problem List   Diagnosis    Atherosclerotic heart disease of native coronary artery with unspecified angina pectoris (HCC)    Schizophrenia, paranoid, chronic    Metabolic syndrome    Vitamin B 12 deficiency    Cerebral microvascular disease    Mixed hyperlipidemia    Other hammer toe (acquired)    Vitamin D insufficiency    Incontinence of urine    Diabetic nephropathy with proteinuria ([facility]    Essential (primary) hypertension    History of type C viral hepatitis    Urinary incontinence due to cognitive impairment    History of seizures    Stented coronary artery-plan is to stay on Plavix indefinately per Dr Frankey Combe    Other specified diabetes mellitus with diabetic neuropathy, unspecified ([address]    Controlled type 2 diabetes mellitus with diabetic neuropathy, with long-term current use of insulin ([facility])    Hemiparesis, left ([facility])    Angina, class II ([address]    Pain, unspecified    Tardive dyskinesia    Shortness of breath    Uncontrolled type 2 diabetes mellitus with hyperglycemia ([facility])    Chronic diastolic congestive heart failure ([facility])    Sleep apnea, unspecified    Pulmonary hypertension, unspecified ([facility])    Class 2 severe obesity with serious comorbidity and body mass index (BMI) of 36.0 to 36.9 in adult [facility])    Edema    Closed supracondylar fracture of right humerus    Other chronic pain    Palliative care patient    Recurrent falls    Renal failure    Difficulty in walking    ESRD (end stage renal disease) on dialysis ([facility])    Weakness    Other seizures ([facility])    Moderate persistent asthma without complication    [id number]    Post PTCA    Falls frequently    Chest wall contusion, left, initial encounter    Headache, unspecified    Paranoid schizophrenia ([address]    Compression fracture of spine ([address]    Closed rib fracture    Depression    Chronic obstructive pulmonary disease ([address]    Critical illness polyneuropathy ([address]    Multiple closed fractures of ribs of right side    Nonrheumatic mitral valve regurgitation    Nonrheumatic tricuspid valve regurgitation    Need for extended care facility    Chronic pain of both shoulders    Hemodialysis-associated hypotension    Anginal chest pain at rest [facility])    Chest pain    Unstable angina ([facility])    NSTEMI (non-ST elevated myocardial infarction) ([facility])    CKD (chronic kidney disease) stage 4, GFR 15-29 ml/min ([facility])    Hyperkalemia    Impaired mobility and activities of daily living dt polyneuropathy and reccent fall     Dialysis patient [facility])    Unspecified open wound of right upper arm, initial encounter    Multiple closed fractures of right lower extremity and ribs    Closed T11 fracture ([facility]    Encephalopathy acute    MRSA bacteremia    Sepsis due to Enterococcus [facility])    Local infection due to central venous catheter    DJD (degenerative joint disease), cervical    Closed head injury    Sarcopenia    Fall from standing    Septicemia ([facility]    Chronic hepatitis C ([facility]    Catheter-related bloodstream infection    Enterococcus faecalis infection    Cervical stenosis of spinal canal    Ataxic gait    Lumbar stenosis with neurogenic claudication    Falls infrequently    Infection of venous access port       Medications Discontinued During This Encounter   Medication Reason    nitroGLYCERIN (NITROSTAT) 0.4 MG SL tablet REORDER       Modified Medications    Modified Medication Previous Medication    NITROGLYCERIN (NITROSTAT) 0.4 MG SL TABLET nitroGLYCERIN (NITROSTAT) 0.4 MG SL tablet       up to max of 3 total doses. If no relief after 1 dose, call 911.    up to max of 3 total doses. If no relief after 1 dose, call 911. Orders Placed This Encounter   Medications    nitroGLYCERIN (NITROSTAT) 0.4 MG SL tablet     Sig: up to max of 3 total doses. If no relief after 1 dose, call 911. Dispense:  25 tablet     Refill:  3       Assessment/Plan:    1. Coronary artery disease involving native heart with angina pectoris, unspecified vessel or lesion type ([facility]    2. CABG and TV repair with pericaridal window- repeat Echo. Will need Cardiac Rehab but not ready yet. Not ready for cardaic Rehab yet. Echo no further PE. --- stable no angina    2. Diastolic congestive heart failure, unspecified HF chronicity (HCC)  Stable. No edema. - continue meds. Low salt diet    3. Stented coronary artery    4. Shortness of breath   stable - no worse    5. Mixed hyperlipidemia  Statin - long term.  Check labs. Low fat diet. 6. Essential hypertension BP low- stop Imdur 120 for now. 7. Coronary artery disease involving native coronary artery of native heart with angina pectoris ([address]    8 dizzy/falls- CUS. - moderate B/L- will continue surveillance. Counseling:  Heart Healthy Lifestyle, Low Salt Diet, Take Precautions to Prevent Falls and Walk Daily    Return in about 3 months (around 11/16/2022).       Electronically signed by Brooklyn Villalba MD on 8/16/2022 at 1:38 PM

## 2022-08-17 ENCOUNTER — OFFICE VISIT (OUTPATIENT)
Dept: ENDOCRINOLOGY | Age: 62
End: 2022-08-17
Payer: MEDICARE

## 2022-08-17 VITALS
OXYGEN SATURATION: 95 % | BODY MASS INDEX: 29.98 KG/M2 | WEIGHT: 191 LBS | HEART RATE: 88 BPM | DIASTOLIC BLOOD PRESSURE: 68 MMHG | HEIGHT: 67 IN | SYSTOLIC BLOOD PRESSURE: 110 MMHG

## 2022-08-17 DIAGNOSIS — E11.65 UNCONTROLLED TYPE 2 DIABETES MELLITUS WITH HYPERGLYCEMIA (HCC): Primary | ICD-10-CM

## 2022-08-17 LAB
CHP ED QC CHECK: ABNORMAL
GLUCOSE BLD-MCNC: 454 MG/DL

## 2022-08-17 PROCEDURE — G8417 CALC BMI ABV UP PARAM F/U: HCPCS | Performed by: INTERNAL MEDICINE

## 2022-08-17 PROCEDURE — 82962 GLUCOSE BLOOD TEST: CPT | Performed by: INTERNAL MEDICINE

## 2022-08-17 PROCEDURE — 1036F TOBACCO NON-USER: CPT | Performed by: INTERNAL MEDICINE

## 2022-08-17 PROCEDURE — 3017F COLORECTAL CA SCREEN DOC REV: CPT | Performed by: INTERNAL MEDICINE

## 2022-08-17 PROCEDURE — 99214 OFFICE O/P EST MOD 30 MIN: CPT | Performed by: INTERNAL MEDICINE

## 2022-08-17 PROCEDURE — 3044F HG A1C LEVEL LT 7.0%: CPT | Performed by: INTERNAL MEDICINE

## 2022-08-17 PROCEDURE — 1111F DSCHRG MED/CURRENT MED MERGE: CPT | Performed by: INTERNAL MEDICINE

## 2022-08-17 PROCEDURE — 2022F DILAT RTA XM EVC RTNOPTHY: CPT | Performed by: INTERNAL MEDICINE

## 2022-08-17 PROCEDURE — G8427 DOCREV CUR MEDS BY ELIG CLIN: HCPCS | Performed by: INTERNAL MEDICINE

## 2022-08-17 RX ORDER — INSULIN ASPART 100 [IU]/ML
INJECTION, SOLUTION INTRAVENOUS; SUBCUTANEOUS
Qty: 30 ML | Refills: 3
Start: 2022-08-17

## 2022-08-17 RX ORDER — MIDODRINE HYDROCHLORIDE 10 MG/1
TABLET ORAL
Qty: 12 TABLET | Refills: 10 | Status: SHIPPED | OUTPATIENT
Start: 2022-08-17

## 2022-08-17 RX ORDER — FUROSEMIDE 80 MG
TABLET ORAL
COMMUNITY
Start: 2022-06-07

## 2022-08-17 RX ORDER — ARIPIPRAZOLE 5 MG/1
TABLET ORAL
Qty: 30 TABLET | Refills: 10 | Status: SHIPPED | OUTPATIENT
Start: 2022-08-17 | End: 2023-02-13

## 2022-08-17 RX ORDER — INSULIN GLARGINE 100 [IU]/ML
INJECTION, SOLUTION SUBCUTANEOUS
Qty: 10 PEN | Refills: 10
Start: 2022-08-17 | End: 2022-10-03

## 2022-08-17 ASSESSMENT — ENCOUNTER SYMPTOMS: EYES NEGATIVE: 1

## 2022-08-17 NOTE — TELEPHONE ENCOUNTER
Requesting medication refill. Please approve or deny this request.    Rx requested:  Requested Prescriptions     Pending Prescriptions Disp Refills    midodrine (PROAMATINE) 10 MG tablet [Pharmacy Med Name: MIDODRINE 10 MG TABS 10 Tablet] 12 tablet 10     Sig: TAKE 1 TABLET BY MOUTH ON DAYS OF DIALYSIS TREATMENT THREE TIMES WEEKLY         Last Office Visit:   8/16/2022      Next Visit Date:  Future Appointments   Date Time Provider Aminata Cortes   8/17/2022  1:45 PM Andrei Garsia  [address]   8/18/2022 11:00 AM Celia Jean Baptiste MD [address] [employer] [employer] [place]   9/1/2022  2:00 PM Jose Cerda MD [address]   11/22/2022  2:00 PM Chay Toth MD [address] [place] [address]               Last refill 3/16/22. Please approve or deny.

## 2022-08-17 NOTE — PROGRESS NOTES
8/17/2022    Assessment:       Diagnosis Orders   1. Uncontrolled type 2 diabetes mellitus with hyperglycemia (HCC)  POCT Glucose            PLAN:     Increased dose of insulin discussed about using freestyle juan more often and at different times of the day  Discussed insulin pump therapy patient wants to hold off on  Average blood sugars around 150/160 range  More than 50% of 30 minutes spent patient education counseling  Diabetes education provided today:    Nutrition as a mainstream of diabetes therapy. [place] about label reading. Insulin pumps, how they work and how they affect blood sugar levels. Continuous Glucose monitor. How it works and checks blood sugars every 5 min. for 4 days during our tests. Managing high and low sugar readings. Orders Placed This Encounter   Procedures    POCT Glucose     No orders of the defined types were placed in this encounter. No follow-ups on file.   Subjective:     Chief Complaint   Patient presents with    Diabetes     Vitals:    08/17/22 1358   BP: 110/68   Site: Left Upper Arm   Position: Sitting   Cuff Size: Large Adult   Pulse: 88   SpO2: 95%   Weight: 191 lb (86.6 kg)   Height: 5' 7\" (1.702 m)     Wt Readings from Last 3 Encounters:   08/17/22 191 lb (86.6 kg)   08/16/22 188 lb (85.3 kg)   07/29/22 193 lb (87.5 kg)     BP Readings from Last 3 Encounters:   08/17/22 110/68   08/16/22 110/80   07/29/22 136/88     Follow-up on type 2 diabetes end-stage renal disease on dialysis history of coronary coronary artery disease patient recently was admitted at rehab at [facility] dose of insulin lowered to Lantus 55 at night blood sugars have been high in the 200+ range using freestyle juan that was downloaded and reviewed currently taking NovoLog 8-10 with each meals compliance variable with diet while she was in the hospital she was needing lower dose of insulin the hospital records were reviewed  Patient was admitted because of weakness fall back pain and rib fracture    Diabetes  She presents for her follow-up diabetic visit. She has type 2 diabetes mellitus. There are no hypoglycemic associated symptoms. Associated symptoms include fatigue. Pertinent negatives for diabetes include no polydipsia and no polyuria. Symptoms are worsening. Diabetic complications include heart disease and nephropathy. Current diabetic treatment includes insulin injections. She is currently taking insulin pre-breakfast, pre-lunch, pre-dinner and at bedtime. She never participates in exercise. Her overall blood glucose range is >200 mg/dl.  (Lab Results       Component                Value               Date                       LABA1C                   6.0 (H)             07/14/2022                2-week [facility] her was reviewed average blood sugar was 250   13% in range   40% high   46% very high)   Past Medical History:   Diagnosis Date    Angina at rest [facility]) 5/24/2020    Anxiety     CAD S/P percutaneous coronary angioplasty 2015, 2018    stents per dr Sonia Orozco    CHF (congestive heart failure) ([address]     CKD (chronic kidney disease) stage 4, GFR 15-29 ml/min ([address] 2/24/2018    CKD stage 4 due to type 2 diabetes mellitus ([address]     Contusion of right chest wall 2/16/2021    COPD (chronic obstructive pulmonary disease) ([address]     Diabetic nephropathy with proteinuria ([address] 2014    DJD (degenerative joint disease) of knee     Dr Arslan Almonte    GERD (gastroesophageal reflux disease)     Hemiparesis, left ([address] 2013    entered Assisted Living ([facility])    Hemodialysis patient [facility])     Hemodialysis-associated hypotension 10/22/2021    History of heart failure     History of seizures     History of type C viral hepatitis     HTN (hypertension)     Hyperlipidemia     Impaired mobility and activities of daily living     Infection of venous access port 7/29/2022    Mediastinal lymphadenopathy 2013    Dr Latosha Gale    Metabolic syndrome     Moderate persistent asthma without complication     Need for extended care facility 2021    Neurogenic urinary incontinence 2013    Neuropathy in diabetes [facility])     Nonrheumatic mitral valve regurgitation 2021    Nonrheumatic tricuspid valve regurgitation 2021    Obesity (BMI 30-39. 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 Radha Posada      x1 Dr. Alec Wiseman, Dr Rene Bundy  08/10/2021    [facility]    DIAGNOSTIC CARDIAC CATH LAB PROCEDURE  10/02/2019    DIALYSIS CATHETER INSERTION Left 2022    Tunneled Symetrex 15.5F x 23cm hemodialysis catheter inserted by Dr. Carlo Tristan Left 2022    [id number] replamcent tunneld HD cath by Dr. Nitesh Parmar, TOTAL ABDOMINAL (CERVIX REMOVED)      one ovary intact, Dr Bryan Gómez, menorrhagia    IR THROMBECTOMY PERCUT AVF  2022    IR THROMBECTOMY PERCUT AVF 2022 [facility] PROCEDURE    IR TUNNELED CATHETER PLACEMENT GREATER THAN 5 YEARS  2022    IR TUNNELED CATHETER PLACEMENT GREATER THAN 5 YEARS 6/3/2022 [facility] PROCEDURE    IR TUNNELED CATHETER PLACEMENT GREATER THAN 5 YEARS Left 2022 Dr. Chad Lombardo exchanged to 15.5F x 28 cm.      15.5 fr by 23 cm Symetrex dialysis catheter inserted by Dr Mynor Segura    IR TUNNELED Jama Krzysztof 5 YEARS  07/07/2022    IR TUNNELED CATHETER PLACEMENT GREATER THAN 5 YEARS 7/7/2022 [facility] PROCEDURE    VA TOTAL KNEE ARTHROPLASTY Left 06/21/2018    LEFT KNEE TOTAL KNEE ARTHROPLASTY, SHAYNA, NERVE BLOCK performed by Maldonado Bazzi MD at [facility]    PTCA      TOE AMPUTATION Right     TOTAL KNEE ARTHROPLASTY  05/19/2016    Dr Barber Wright    TUNNELED [address] Right 07/01/2020    tunneled HD catheter per Dr Lakeshia Torres History    Marital status:      Spouse name: Not on file    Number of children: 2    Years of education: Not on file    Highest education level: Not on file   Occupational History    Occupation: disabled   Tobacco Use    Smoking status: Never    Smokeless tobacco: Never   Vaping Use    Vaping Use: Never used   Substance and Sexual Activity    Alcohol use: No     Alcohol/week: 0.0 standard drinks    Drug use: No    Sexual activity: Not Currently   Other Topics Concern    Not on file   Social History Narrative    Disabled dt depression and low back pain, lives in [facility] Daniel Daley is close by and is her paid caregiver via waiver services    HD via [employer], [id number], [id number], Sat. Son is in the home and has CP and is total care-and is also cared for by a waiver by Katrin Duncan.     Pt was born in [place], one [facility] a twin sister Indy Da Silva, very ill in 2018, [place] [address]    Moved to [facility], , 2 children, one son (disabled with CP) and one daughter Silvia Arana at [facility], as a nurse's aide Disabled due to mental illness and back pain    Lived at [facility], was discharged, returned to independent living in 2017 in the daughter's house and has adjusted well    One son and one daughter, live in the same house with patient, Maurice Grey pays the rent    Hobbies reading (mysteries) 10/11/2021 [facility] updates; patient lives with her daughter son-in-law and 2 grandchildren and patient's handicapped son. Per daughter, her brother is blind, MRDD, CP multiple health issues. Daughter's  is patient's legal guardian. Patient has hemodialysis Tuesday Thursday and Saturday. Patient's bedroom is on main floor with a half bath. Daughter walks patient upstairs once weekly for full bath. Patient is using her walker in the home. Patient has a hospital bed in the home. Social Determinants of Health     Financial Resource Strain: Low Risk     Difficulty of Paying Living Expenses: Not hard at all   Food Insecurity: No Food Insecurity    Worried About [address] [employer] in the Last Year: Never true    [employer] in the Last Year: Never true   Transportation Needs: No Transportation Needs    Lack of Transportation (Medical): No    Lack of Transportation (Non-Medical): No   Physical Activity: Sufficiently Active    Days of Exercise per Week: 3 days    Minutes of Exercise per Session: 60 min   Stress: Not on file   Social Connections: Not on file   Intimate Partner Violence: Not on file   Housing Stability: Not on file     Family History   Problem Relation Age of Onset    Cancer Mother 76        survived    Breast Cancer Mother     Hypertension Father     Diabetes Sister     Mental Illness Sister     Colon Cancer Neg Hx      Allergies   Allergen Reactions    Codeine Hives     hives    Oxycontin [Oxycodone Hcl] Hives       Current Outpatient Medications:     midodrine (PROAMATINE) 10 MG tablet, TAKE 1 TABLET BY MOUTH ON DAYS OF DIALYSIS TREATMENT THREE TIMES WEEKLY, Disp: 12 tablet, Rfl: 10    furosemide (LASIX) 80 MG tablet, , Disp: , Rfl:     nitroGLYCERIN (NITROSTAT) 0.4 MG SL tablet, up to max of 3 total doses.  If no relief after 1 dose, call 911., Disp: 25 tablet, Rfl: 3    isosorbide mononitrate (IMDUR) 30 MG extended release tablet, TAKE 4 TABLETS BY MOUTH DAILY, Disp: 90 tablet, Rfl: 1    B Complex-C-Folic Acid (MARK-KANDACE) TABS, Take 1 tablet by mouth in the morning., Disp: , Rfl:     ondansetron (ZOFRAN) 4 MG tablet, Take 4 mg by mouth every 8 hours as needed for Nausea or Vomiting, Disp: , Rfl:     sertraline (ZOLOFT) 50 MG tablet, Take 1 tablet by mouth nightly, Disp: 30 tablet, Rfl: 5    trimethobenzamide (TIGAN) 100 MG/ML injection, Inject 2 mLs into the muscle every 6 hours as needed for Nausea, Disp: 100 mL, Rfl: 0    Vitamin D (CHOLECALCIFEROL) 50 MCG (2000 UT) TABS tablet, Take 1 tablet by mouth Daily with supper, Disp: 60 tablet, Rfl: 5    atorvastatin (LIPITOR) 40 MG tablet, Take 1 tablet by mouth nightly, Disp: 30 tablet, Rfl: 5    insulin aspart (NOVOLOG FLEXPEN) 100 UNIT/ML injection pen, INJECT 8 TO 10 UNITS WITH EACH MEAL., Disp: 30 mL, Rfl: 3    ARIPiprazole (ABILIFY) 5 MG tablet, TAKE 1 TABLET BY MOUTH ONCE DAILY, Disp: 30 tablet, Rfl: 0    NYAMYC 594507 UNIT/GM powder, APPLY TO AFFECTED AREA OF ABDOMINAL FOLDS EVERY 12 HOURS AS NEEDED, Disp: 60 g, Rfl: 5    magnesium oxide (MAG-OX) 400 MG tablet, TAKE 1 TABLET BY MOUTH DAILY, Disp: 30 tablet, Rfl: 12    melatonin 10 MG CAPS capsule, Take 1 capsule by mouth nightly, Disp: 30 capsule, Rfl: 12    midodrine (PROAMATINE) 5 MG tablet, Take 1 tablet by mouth one time a day every Tue, Thu, Sat for hypotension. Give 30 mins prior to dialysis. , Disp: 90 tablet, Rfl: 3    Handicap Placard MISC, by Does not apply route Expiration in 5 years. , Disp: 1 each, Rfl: 0    lidocaine-prilocaine (EMLA) 2.5-2.5 % cream, Apply topically as needed for Pain Apply topically as needed.  3 times a week before dialysis wrap with saran wrap, Disp: , Rfl:     albuterol (PROVENTIL) (2.5 MG/3ML) 0.083% nebulizer solution, Take 3 mLs by nebulization every 4 hours as needed for Wheezing, Disp: 120 each, Rfl: 3    blood glucose test strips (ONETOUCH VERIO) strip, QID, Disp: 300 each, Rfl: 3    OneTouch Delica Lancets 89K MISC, QID, Disp: 300 each, Rfl: 3    Blood Glucose Monitoring Suppl (Sylvester Rubin) w/Device KIT, AS DIRECTED, Disp: 1 kit, Rfl: 0    Insulin Pen Needle (SURE COMFORT PEN NEEDLES) 30G X 8 MM MISC, USE AS DIRECTED FIVE TIMES A DAILY, Disp: 100 each, Rfl: 10    LANTUS SOLOSTAR 100 UNIT/ML injection pen, 55 units at bedtime, Disp: 10 pen, Rfl: 10    aspirin EC 81 MG EC tablet, Take 1 tablet by mouth daily, Disp: 30 tablet, Rfl: 12    blood glucose test strips (FREESTYLE LITE) strip, 1 each by Does not apply route 4 times daily (before meals and nightly) As needed. , Disp: 200 strip, Rfl: 5    Alcohol Swabs (EASY TOUCH ALCOHOL PREP MEDIUM) 70 % PADS, USE AS DIRECTED THREE TIMES A DAY, Disp: 100 each, Rfl: 3    FreeStyle Lancets MISC, Test 4x daily, Disp: 150 each, Rfl: 3    acetaminophen (TYLENOL) 325 MG tablet, Take 2 tablets by mouth every 4 hours as needed for Pain (For mild to moderate pain (Pain 1-6 out of 10 on pain scale)), Disp: 120 tablet, Rfl: 0    Blood Glucose Monitoring Suppl (FREESTYLE LITE) [place], 1 Device by Does not apply route daily as needed (Diabetes) Use freestyle meter to test blood sugar as needed, Disp: 1 Device, Rfl: 0  Lab Results   Component Value Date     (L) 07/16/2022    K 4.6 07/16/2022    CL 94 (L) 07/16/2022    CO2 23 07/16/2022    BUN 39 (H) 07/16/2022    CREATININE 3.75 (H) 07/16/2022    GLUCOSE 454 (H) 08/17/2022    CALCIUM 9.6 07/16/2022    PROT 6.1 (L) 07/04/2022    LABALBU 3.9 07/09/2022    BILITOT 0.9 (H) 07/04/2022    ALKPHOS 117 07/04/2022    AST 21 07/04/2022    ALT 16 07/04/2022    LABGLOM 12.1 (L) 07/16/2022    GFRAA 14.7 (L) 07/16/2022    GLOB 2.8 07/04/2022     Lab Results   Component Value Date    WBC 8.4 07/14/2022    HGB 9.2 (L) 07/14/2022    HCT 26.9 (L) 07/14/2022    MCV 91.3 07/14/2022     07/14/2022     Lab Results   Component Value Date    LABA1C 6.0 (H) 07/14/2022    LABA1C 6.7 04/13/2022    LABA1C 6.8 12/13/2021     Lab Results   Component Value Date    HDL 33 (L) 04/09/2022    HDL 39 (L) 01/13/2022    HDL 48 06/11/2020    LDLCALC 19 04/09/2022    LDLCALC 38 01/13/2022    LDLCALC 37 06/11/2020    CHOL 70 04/09/2022    CHOL 123 01/13/2022    CHOL 101 06/11/2020    TRIG 92 04/09/2022    TRIG 228 (H) 01/13/2022    TRIG 82 06/11/2020     No results found for: TESTM  Lab Results   Component Value Date    TSH 0.513 06/30/2022    TSH 0.856 07/16/2021    TSH 0.454 06/28/2020    TSHREFLEX 1.300 08/27/2019     No results found for: TPOABS    Review of Systems   Constitutional:  Positive for fatigue. Eyes: Negative. Cardiovascular: Negative. Endocrine: Negative for polydipsia and polyuria. Musculoskeletal:  Positive for arthralgias and gait problem. Psychiatric/Behavioral:  Positive for dysphoric mood. Objective:   Physical Exam  Vitals reviewed. Constitutional:       General: She is not in acute distress. Appearance: Normal appearance. She is obese. HENT:      Head: Normocephalic and atraumatic. Right Ear: External ear normal.      Left Ear: External ear normal.      Nose: Nose normal.   Eyes:      General: No scleral icterus. Right eye: No discharge. Left eye: No discharge. Extraocular Movements: Extraocular movements intact. Conjunctiva/sclera: Conjunctivae normal.   Cardiovascular:      Rate and Rhythm: Normal rate. Pulmonary:      Effort: Pulmonary effort is normal.   Musculoskeletal:         General: Normal range of motion. Cervical back: Normal range of motion and neck supple. Neurological:      General: No focal deficit present. Mental Status: She is alert and oriented to person, place, and time.    Psychiatric:         Mood and Affect: Mood normal.         Behavior: Behavior normal.

## 2022-08-18 ENCOUNTER — OFFICE VISIT (OUTPATIENT)
Dept: INFECTIOUS DISEASES | Age: 62
End: 2022-08-18
Payer: MEDICARE

## 2022-08-18 VITALS
BODY MASS INDEX: 29.91 KG/M2 | SYSTOLIC BLOOD PRESSURE: 110 MMHG | HEIGHT: 67 IN | HEART RATE: 82 BPM | TEMPERATURE: 97.5 F | RESPIRATION RATE: 18 BRPM | DIASTOLIC BLOOD PRESSURE: 60 MMHG | OXYGEN SATURATION: 98 %

## 2022-08-18 DIAGNOSIS — B95.2 ENTEROCOCCUS FAECALIS INFECTION: Primary | ICD-10-CM

## 2022-08-18 DIAGNOSIS — T80.219D INFECTION OF VENOUS ACCESS PORT, SUBSEQUENT ENCOUNTER: ICD-10-CM

## 2022-08-18 PROCEDURE — G8417 CALC BMI ABV UP PARAM F/U: HCPCS | Performed by: INTERNAL MEDICINE

## 2022-08-18 PROCEDURE — 99213 OFFICE O/P EST LOW 20 MIN: CPT | Performed by: INTERNAL MEDICINE

## 2022-08-18 PROCEDURE — 1036F TOBACCO NON-USER: CPT | Performed by: INTERNAL MEDICINE

## 2022-08-18 PROCEDURE — G8427 DOCREV CUR MEDS BY ELIG CLIN: HCPCS | Performed by: INTERNAL MEDICINE

## 2022-08-18 PROCEDURE — 3017F COLORECTAL CA SCREEN DOC REV: CPT | Performed by: INTERNAL MEDICINE

## 2022-08-18 PROCEDURE — 1111F DSCHRG MED/CURRENT MED MERGE: CPT | Performed by: INTERNAL MEDICINE

## 2022-08-18 ASSESSMENT — ENCOUNTER SYMPTOMS
RESPIRATORY NEGATIVE: 1
GASTROINTESTINAL NEGATIVE: 1

## 2022-08-18 NOTE — PROGRESS NOTES
Infectious Disease     Patient Name: Ankit Balbuena  Date: 8/18/2022  YOB: 1960  Medical Record Number: [id number]        Sepsis with Enterococcus  Infected dialysis cath       7/1/22 1613   Culture Catheter Tip Culture, Catheter Tip:  NO GROWTH   Performed at [facility] [address]   ([phone]           6/30/22 3871   Culture, Blood Id Sensitivity  Abnormal   Cult,Blood:  POSITIVE Blood Culture   Performed at [address]   ([phone]   P      Organism Enterococcus faecalis Abnormal  P    Resulting Agency [address] [facility]            Susceptibility        Enterococcus faecalis (2)     Antibiotic Interpretation Microscan   Method Status     ampicillin Sensitive <=2 mcg/mL BACTERIAL SUSCEPTIBILITY PANEL BY DIYA       vancomycin Sensitive 1 mcg/mL BACTERIAL SUSCEPTIBILITY PANEL BY DIYA                  Review of Systems   Constitutional: Negative. Respiratory: Negative. Cardiovascular: Negative. Gastrointestinal: Negative.       Review of Systems: All 14 review of systems negative other than as stated above    Social History     Tobacco Use    Smoking status: Never    Smokeless tobacco: Never   Vaping Use    Vaping Use: Never used   Substance Use Topics    Alcohol use: No     Alcohol/week: 0.0 standard drinks    Drug use: No         Past Medical History:   Diagnosis Date    Angina at rest [facility]) 5/24/2020    Anxiety     CAD S/P percutaneous coronary angioplasty 2015, 2018    stents per dr Alex Mallory    CHF (congestive heart failure) ([address]     CKD (chronic kidney disease) stage 4, GFR 15-29 ml/min ([address] 2/24/2018    CKD stage 4 due to type 2 diabetes mellitus ([address]     Contusion of right chest wall 2/16/2021    COPD (chronic obstructive pulmonary disease) ([address]     Diabetic nephropathy with proteinuria ([address] 2014    DJD (degenerative joint disease) of knee     Dr Becca Nina    GERD (gastroesophageal reflux disease)     Hemiparesis, left ([address] 2013    entered Assisted Living ([facility])    Hemodialysis patient [facility])     Hemodialysis-associated hypotension 10/22/2021    History of heart failure     History of seizures     History of type C viral hepatitis     HTN (hypertension)     Hyperlipidemia     Impaired mobility and activities of daily living     Infection of venous access port 2022    Mediastinal lymphadenopathy     Dr Justine Briggs    Metabolic syndrome     Moderate persistent asthma without complication     Need for extended care facility 2021    Neurogenic urinary incontinence 2013    Neuropathy in diabetes [facility])     Nonrheumatic mitral valve regurgitation 2021    Nonrheumatic tricuspid valve regurgitation 2021    Obesity (BMI 30-39. 9)     Recurrent UTI     S/P colonoscopy     [facility], focal active colitis    Schizophrenia, paranoid, chronic ([address]     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 Cruz Pro      x1 Dr. Alexandra Daley, Dr Webster Job  08/10/2021    [facility]    DIAGNOSTIC CARDIAC CATH LAB PROCEDURE  10/02/2019    DIALYSIS CATHETER INSERTION Left 2022    Tunneled Symetrex 15.5F x 23cm hemodialysis catheter inserted by Dr. Jodie Michaud Left 07/18/2022    [id number] replamcent tunneld HD cath by Dr. Felipe Mcdonald, TOTAL ABDOMINAL (CERVIX REMOVED)      one ovary intact, Dr Sarmiento Right, menorrhagia    IR THROMBECTOMY PERCUT AVF  06/06/2022    IR THROMBECTOMY PERCUT AVF 6/6/2022 [facility]ED[facility]    IR TUNNELED CATHETER PLACEMENT GREATER THAN 5 YEARS  06/03/2022    IR TUNNELED CATHETER PLACEMENT GREATER THAN 5 YEARS 6/3/2022 [facility]    IR TUNNELED CATHETER PLACEMENT GREATER THAN 5 YEARS Left 07/07/2022 7/18/22 Dr. Surya Louise exchanged to 15.5F x 28 cm.      15.5 fr by 23 cm Symetrex dialysis catheter inserted by Dr Surya Louise    IR TUNNELED [address] 5 YEARS  07/07/2022    IR TUNNELED CATHETER PLACEMENT GREATER THAN 5 YEARS 7/7/2022 [facility] PROCED[facility]    OR TOTAL KNEE ARTHROPLASTY Left 06/21/2018    LEFT KNEE TOTAL KNEE ARTHROPLASTY, SHAYNA, NERVE BLOCK performed by Mayela Amezquita MD at [address] [place], [place] [address] Right     TOTAL KNEE ARTHROPLASTY  05/19/2016    Dr Jolie Bautista    TUNNELED [address] Right 07/01/2020    tunneled HD catheter per Dr Surya Louise         Current Outpatient Medications on File Prior to Visit   Medication Sig Dispense Refill    ARIPiprazole (ABILIFY) 5 MG tablet TAKE 1 TABLET BY MOUTH ONCE DAILY 30 tablet 10    midodrine (PROAMATINE) 10 MG tablet TAKE 1 TABLET BY MOUTH ON DAYS OF DIALYSIS TREATMENT THREE TIMES WEEKLY 12 tablet 10    furosemide (LASIX) 80 MG tablet       insulin aspart (NOVOLOG FLEXPEN) 100 UNIT/ML injection pen INJECT 12 UNITS WITH EACH MEAL. 30 mL 3    LANTUS SOLOSTAR 100 UNIT/ML injection pen 70 units at bedtime 10 pen 10    nitroGLYCERIN (NITROSTAT) 0.4 MG SL tablet up to max of 3 total doses.  If no relief after 1 dose, call 911. 25 tablet 3    isosorbide mononitrate (IMDUR) 30 MG extended release tablet TAKE 4 TABLETS BY MOUTH DAILY 90 tablet 1    B Complex-C-Folic Acid (MARK-KANDACE) TABS Take 1 tablet by mouth in the morning. ondansetron (ZOFRAN) 4 MG tablet Take 4 mg by mouth every 8 hours as needed for Nausea or Vomiting      sertraline (ZOLOFT) 50 MG tablet Take 1 tablet by mouth nightly 30 tablet 5    trimethobenzamide (TIGAN) 100 MG/ML injection Inject 2 mLs into the muscle every 6 hours as needed for Nausea 100 mL 0    Vitamin D (CHOLECALCIFEROL) 50 MCG (2000 UT) TABS tablet Take 1 tablet by mouth Daily with supper 60 tablet 5    atorvastatin (LIPITOR) 40 MG tablet Take 1 tablet by mouth nightly 30 tablet 5    NYAMYC 420647 UNIT/GM powder APPLY TO AFFECTED AREA OF ABDOMINAL FOLDS EVERY 12 HOURS AS NEEDED 60 g 5    magnesium oxide (MAG-OX) 400 MG tablet TAKE 1 TABLET BY MOUTH DAILY 30 tablet 12    melatonin 10 MG CAPS capsule Take 1 capsule by mouth nightly 30 capsule 12    midodrine (PROAMATINE) 5 MG tablet Take 1 tablet by mouth one time a day every Tue, Thu, Sat for hypotension. Give 30 mins prior to dialysis. 90 tablet 3    Handicap Placard MISC by Does not apply route Expiration in 5 years. 1 each 0    lidocaine-prilocaine (EMLA) 2.5-2.5 % cream Apply topically as needed for Pain Apply topically as needed. 3 times a week before dialysis wrap with saran wrap      albuterol (PROVENTIL) (2.5 MG/3ML) 0.083% nebulizer solution Take 3 mLs by nebulization every 4 hours as needed for Wheezing 120 each 3    blood glucose test strips (ONETOUCH VERIO) strip  each 3    OneTouch Delica Lancets 97I MISC  each 3    Blood Glucose Monitoring Suppl (ONETOUCH VERIO) w/Device KIT AS DIRECTED 1 kit 0    Insulin Pen Needle (SURE COMFORT PEN NEEDLES) 30G X 8 MM MISC USE AS DIRECTED FIVE TIMES A DAILY 100 each 10    aspirin EC 81 MG EC tablet Take 1 tablet by mouth daily 30 tablet 12    blood glucose test strips (FREESTYLE LITE) strip 1 each by Does not apply route 4 times daily (before meals and nightly) As needed.  200 strip 5    Alcohol Swabs (EASY TOUCH ALCOHOL PREP MEDIUM) 70 % PADS USE AS DIRECTED THREE TIMES A  each 3    FreeStyle Lancets MISC Test 4x daily 150 each 3    acetaminophen (TYLENOL) 325 MG tablet Take 2 tablets by mouth every 4 hours as needed for Pain (For mild to moderate pain (Pain 1-6 out of 10 on pain scale)) 120 tablet 0    Blood Glucose Monitoring Suppl (FREESTYLE LITE) CORETTA 1 Device by Does not apply route daily as needed (Diabetes) Use freestyle meter to test blood sugar as needed 1 Device 0    [DISCONTINUED] metoprolol tartrate (LOPRESSOR) 25 MG tablet TAKE 1/2 TABLET BY MOUTH TWO TIMES DAILY  (Patient taking differently: Take 12.5 mg by mouth 2 times daily ) 90 tablet 4     No current facility-administered medications on file prior to visit. Allergies   Allergen Reactions    Codeine Hives     hives    Oxycontin [Oxycodone Hcl] Hives         Family History   Problem Relation Age of Onset    Cancer Mother 76        survived    Breast Cancer Mother     Hypertension Father     Diabetes Sister     Mental Illness Sister     Colon Cancer Neg Hx          Physical Exam:      Physical Exam  Constitutional:       Appearance: She is obese. She is not ill-appearing. Cardiovascular:      Heart sounds: Murmur heard. Pulmonary:      Effort: Pulmonary effort is normal. No respiratory distress. Breath sounds: Normal breath sounds. No stridor. No wheezing, rhonchi or rales. Chest:      Chest wall: No tenderness. Comments: Dialysis catheter left chest no evidence of inflammation  Abdominal:      General: Abdomen is flat. Bowel sounds are normal. There is no distension. Palpations: There is no mass. Tenderness: There is no abdominal tenderness. There is no guarding or rebound. Hernia: No hernia is present. Blood pressure 110/60, pulse 82, temperature 97.5 °F (36.4 °C), temperature source Temporal, resp. rate 18, height 5' 7\" (1.702 m), SpO2 98 %, not currently breastfeeding.       .   Lab Results   Component Value Date    WBC 8.4 07/14/2022    HGB 9.2 (L) 07/14/2022    HCT 26.9 (L) 07/14/2022    MCV 91.3 07/14/2022     07/14/2022     Lab Results   Component Value Date/Time     07/16/2022 05:56 PM    K 4.6 07/16/2022 05:56 PM    K 4.3 07/04/2022 03:07 AM    CL 94 07/16/2022 05:56 PM    CO2 23 07/16/2022 05:56 PM    BUN 39 07/16/2022 05:56 PM    CREATININE 3.75 07/16/2022 05:56 PM    GLUCOSE 454 08/17/2022 02:00 PM    GLUCOSE 419 07/30/2021 08:16 AM    CALCIUM 9.6 07/16/2022 05:56 PM                ASSESSMENT:  Patient Active Problem List   Diagnosis    Atherosclerotic heart disease of native coronary artery with unspecified angina pectoris (HCC)    Schizophrenia, paranoid, chronic    Metabolic syndrome    Vitamin B 12 deficiency    Cerebral microvascular disease    Mixed hyperlipidemia    Other hammer toe (acquired)    Vitamin D insufficiency    Incontinence of urine    Diabetic nephropathy with proteinuria (HCC)    Essential (primary) hypertension    History of type C viral hepatitis    Urinary incontinence due to cognitive impairment    History of seizures    Stented coronary artery-plan is to stay on Plavix indefinately per Dr Sarai Aranda    Other specified diabetes mellitus with diabetic neuropathy, unspecified ([facility]    Controlled type 2 diabetes mellitus with diabetic neuropathy, with long-term current use of insulin (HCC)    Hemiparesis, left (HCC)    Angina, class II (HCC)    Pain, unspecified    Tardive dyskinesia    Shortness of breath    Uncontrolled type 2 diabetes mellitus with hyperglycemia (HCC)    Chronic diastolic congestive heart failure (HCC)    Sleep apnea, unspecified    Pulmonary hypertension, unspecified (HCC)    Class 2 severe obesity with serious comorbidity and body mass index (BMI) of 36.0 to 36.9 in [facility])    Edema    Closed supracondylar fracture of right humerus    Other chronic pain    Palliative care patient    Recurrent falls    Renal failure    Difficulty in walking    ESRD (end stage renal disease) on dialysis ([address]    Weakness    Other seizures ([address]    Moderate persistent asthma without complication    [id number]    Post PTCA    Falls frequently    Chest wall contusion, left, initial encounter    Headache, unspecified    Paranoid schizophrenia ([address]    Compression fracture of spine ([address]    Closed rib fracture    Depression    Chronic obstructive pulmonary disease (HCC)    Critical illness polyneuropathy ([address]    Multiple closed fractures of ribs of right side    Nonrheumatic mitral valve regurgitation    Nonrheumatic tricuspid valve regurgitation    Need for extended care facility    Chronic pain of both shoulders    Hemodialysis-associated hypotension    Anginal chest pain at rest [facility])    Chest pain    Unstable angina ([facility])    NSTEMI (non-ST elevated myocardial infarction) ([facility])    CKD (chronic kidney disease) stage 4, GFR 15-29 ml/min ([facility])    Hyperkalemia    Impaired mobility and activities of daily living dt polyneuropathy and reccent fall     Dialysis patient [facility])    Unspecified open wound of right upper arm, initial encounter    Multiple closed fractures of right lower extremity and ribs    Closed T11 fracture ([address]    Encephalopathy acute    MRSA bacteremia    Sepsis due to Enterococcus ([address]    Local infection due to central venous catheter    DJD (degenerative joint disease), cervical    Closed head injury    Sarcopenia    Fall from standing    Septicemia ([address]    Chronic hepatitis C ([address]    Catheter-related bloodstream infection    Enterococcus faecalis infection    Cervical stenosis of spinal canal    Ataxic gait    Lumbar stenosis with neurogenic claudication    Falls infrequently    Infection of venous access port         PLAN:  Sepsis with Enterococcus    Infected dialysis cath    Completed vancomycin x 2week

## 2022-08-20 ENCOUNTER — APPOINTMENT (OUTPATIENT)
Dept: ULTRASOUND IMAGING | Age: 62
End: 2022-08-20
Payer: MEDICARE

## 2022-08-20 ENCOUNTER — HOSPITAL ENCOUNTER (EMERGENCY)
Age: 62
Discharge: HOME | End: 2022-08-21
Payer: MEDICARE

## 2022-08-20 ENCOUNTER — APPOINTMENT (OUTPATIENT)
Dept: CT IMAGING | Age: 62
End: 2022-08-20
Payer: MEDICARE

## 2022-08-20 DIAGNOSIS — R53.1 GENERALIZED WEAKNESS: Primary | ICD-10-CM

## 2022-08-20 DIAGNOSIS — R11.2 NON-INTRACTABLE VOMITING WITH NAUSEA, UNSPECIFIED VOMITING TYPE: ICD-10-CM

## 2022-08-20 DIAGNOSIS — M25.562 ACUTE PAIN OF LEFT KNEE: ICD-10-CM
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
## 2022-08-20 LAB
ALBUMIN SERPL-MCNC: 3.6 G/DL (ref 3.5–4.6)
ALP BLD-CCNC: 152 U/L (ref 40–130)
ALT SERPL-CCNC: 21 U/L (ref 0–33)
ANION GAP SERPL CALCULATED.3IONS-SCNC: 10 MEQ/L (ref 9–15)
AST SERPL-CCNC: 30 U/L (ref 0–35)
BASOPHILS ABSOLUTE: 0.1 K/UL (ref 0–0.2)
BASOPHILS RELATIVE PERCENT: 0.6 %
BILIRUB SERPL-MCNC: 0.6 MG/DL (ref 0.2–0.7)
BUN BLDV-MCNC: 15 MG/DL (ref 8–23)
CALCIUM SERPL-MCNC: 10 MG/DL (ref 8.5–9.9)
CHLORIDE BLD-SCNC: 90 MEQ/L (ref 95–107)
CO2: 32 MEQ/L (ref 20–31)
CREAT SERPL-MCNC: 3 MG/DL (ref 0.5–0.9)
EOSINOPHILS ABSOLUTE: 0.3 K/UL (ref 0–0.7)
EOSINOPHILS RELATIVE PERCENT: 3.1 %
GFR AFRICAN AMERICAN: 19
GFR NON-AFRICAN AMERICAN: 15.7
GLOBULIN: 3.9 G/DL (ref 2.3–3.5)
GLUCOSE BLD-MCNC: 309 MG/DL (ref 70–99)
HCT VFR BLD CALC: 29.4 % (ref 37–47)
HEMOGLOBIN: 9.9 G/DL (ref 12–16)
LACTIC ACID: 2 MMOL/L (ref 0.5–2.2)
LYMPHOCYTES ABSOLUTE: 1.2 K/UL (ref 1–4.8)
LYMPHOCYTES RELATIVE PERCENT: 12.7 %
MAGNESIUM: 2.2 MG/DL (ref 1.7–2.4)
MCH RBC QN AUTO: 30.4 PG (ref 27–31.3)
MCHC RBC AUTO-ENTMCNC: 33.8 % (ref 33–37)
MCV RBC AUTO: 89.9 FL (ref 82–100)
MONOCYTES ABSOLUTE: 0.7 K/UL (ref 0.2–0.8)
MONOCYTES RELATIVE PERCENT: 7.7 %
NEUTROPHILS ABSOLUTE: 7.2 K/UL (ref 1.4–6.5)
NEUTROPHILS RELATIVE PERCENT: 75.9 %
PDW BLD-RTO: 16.2 % (ref 11.5–14.5)
PLATELET # BLD: 266 K/UL (ref 130–400)
POTASSIUM SERPL-SCNC: 4.1 MEQ/L (ref 3.4–4.9)
PROCALCITONIN: 0.22 NG/ML (ref 0–0.15)
RBC # BLD: 3.27 M/UL (ref 4.2–5.4)
SODIUM BLD-SCNC: 132 MEQ/L (ref 135–144)
TOTAL PROTEIN: 7.5 G/DL (ref 6.3–8)
TROPONIN: 0.04 NG/ML (ref 0–0.01)
WBC # BLD: 9.4 K/UL (ref 4.8–10.8)

## 2022-08-20 PROCEDURE — 84145 PROCALCITONIN (PCT): CPT

## 2022-08-20 PROCEDURE — 85025 COMPLETE CBC W/AUTO DIFF WBC: CPT

## 2022-08-20 PROCEDURE — 80053 COMPREHEN METABOLIC PANEL: CPT

## 2022-08-20 PROCEDURE — 81001 URINALYSIS AUTO W/SCOPE: CPT

## 2022-08-20 PROCEDURE — 83735 ASSAY OF MAGNESIUM: CPT

## 2022-08-20 PROCEDURE — 71250 CT THORAX DX C-: CPT

## 2022-08-20 PROCEDURE — 74176 CT ABD & PELVIS W/O CONTRAST: CPT

## 2022-08-20 PROCEDURE — 83605 ASSAY OF LACTIC ACID: CPT

## 2022-08-20 PROCEDURE — 6360000002 HC RX W HCPCS

## 2022-08-20 PROCEDURE — 70450 CT HEAD/BRAIN W/O DYE: CPT

## 2022-08-20 PROCEDURE — 84484 ASSAY OF TROPONIN QUANT: CPT

## 2022-08-20 PROCEDURE — 96374 THER/PROPH/DIAG INJ IV PUSH: CPT

## 2022-08-20 PROCEDURE — 99284 EMERGENCY DEPT VISIT MOD MDM: CPT

## 2022-08-20 PROCEDURE — 87040 BLOOD CULTURE FOR BACTERIA: CPT

## 2022-08-20 PROCEDURE — 93971 EXTREMITY STUDY: CPT

## 2022-08-20 PROCEDURE — 36415 COLL VENOUS BLD VENIPUNCTURE: CPT

## 2022-08-20 RX ORDER — FENTANYL CITRATE 50 UG/ML
25 INJECTION, SOLUTION INTRAMUSCULAR; INTRAVENOUS ONCE
Status: COMPLETED | OUTPATIENT
Start: 2022-08-20 | End: 2022-08-20

## 2022-08-20 RX ADMIN — FENTANYL CITRATE 25 MCG: 50 INJECTION, SOLUTION INTRAMUSCULAR; INTRAVENOUS at 22:45

## 2022-08-20 ASSESSMENT — PAIN DESCRIPTION - ORIENTATION
ORIENTATION: LEFT

## 2022-08-20 ASSESSMENT — PAIN DESCRIPTION - DESCRIPTORS
DESCRIPTORS: ACHING

## 2022-08-20 ASSESSMENT — LIFESTYLE VARIABLES
HOW OFTEN DO YOU HAVE A DRINK CONTAINING ALCOHOL: NEVER
HOW MANY STANDARD DRINKS CONTAINING ALCOHOL DO YOU HAVE ON A TYPICAL DAY: PATIENT DOES NOT DRINK

## 2022-08-20 ASSESSMENT — ENCOUNTER SYMPTOMS
BLOOD IN STOOL: 0
DIARRHEA: 0
NAUSEA: 1
SHORTNESS OF BREATH: 0
CONSTIPATION: 0
COUGH: 0
PHOTOPHOBIA: 0
VOMITING: 1
ABDOMINAL PAIN: 1

## 2022-08-20 ASSESSMENT — PAIN DESCRIPTION - LOCATION
LOCATION: KNEE

## 2022-08-20 ASSESSMENT — PAIN - FUNCTIONAL ASSESSMENT
PAIN_FUNCTIONAL_ASSESSMENT: 0-10
PAIN_FUNCTIONAL_ASSESSMENT: 0-10

## 2022-08-20 ASSESSMENT — PAIN SCALES - GENERAL
PAINLEVEL_OUTOF10: 7
PAINLEVEL_OUTOF10: 6

## 2022-08-20 ASSESSMENT — PAIN DESCRIPTION - ONSET: ONSET: ON-GOING

## 2022-08-20 ASSESSMENT — PAIN DESCRIPTION - PAIN TYPE: TYPE: ACUTE PAIN

## 2022-08-20 ASSESSMENT — VISUAL ACUITY: OU: 1

## 2022-08-20 ASSESSMENT — PAIN DESCRIPTION - FREQUENCY: FREQUENCY: CONTINUOUS

## 2022-08-21 VITALS
WEIGHT: 195 LBS | SYSTOLIC BLOOD PRESSURE: 145 MMHG | DIASTOLIC BLOOD PRESSURE: 67 MMHG | TEMPERATURE: 97.9 F | HEART RATE: 74 BPM | HEIGHT: 67 IN | RESPIRATION RATE: 17 BRPM | OXYGEN SATURATION: 98 % | BODY MASS INDEX: 30.61 KG/M2

## 2022-08-21 LAB
BACTERIA: ABNORMAL /HPF
BILIRUBIN URINE: NEGATIVE
BLOOD, URINE: ABNORMAL
CLARITY: ABNORMAL
COLOR: ABNORMAL
EPITHELIAL CELLS, UA: ABNORMAL /HPF (ref 0–5)
GLUCOSE URINE: 500 MG/DL
HYALINE CASTS: ABNORMAL /HPF (ref 0–5)
KETONES, URINE: ABNORMAL MG/DL
LEUKOCYTE ESTERASE, URINE: ABNORMAL
NITRITE, URINE: NEGATIVE
PH UA: 6.5 (ref 5–9)
PROTEIN UA: 100 MG/DL
RBC UA: ABNORMAL /HPF (ref 0–5)
SPECIFIC GRAVITY UA: 1.02 (ref 1–1.03)
URINE REFLEX TO CULTURE: YES
UROBILINOGEN, URINE: 1 E.U./DL
WBC UA: >100 /HPF (ref 0–5)
YEAST: PRESENT /HPF

## 2022-08-21 PROCEDURE — 87086 URINE CULTURE/COLONY COUNT: CPT

## 2022-08-21 PROCEDURE — 87186 SC STD MICRODIL/AGAR DIL: CPT

## 2022-08-21 PROCEDURE — 87077 CULTURE AEROBIC IDENTIFY: CPT

## 2022-08-21 NOTE — ED TRIAGE NOTES
Pt arrived with c/o weakness and dizziness since last Monday. Pt is A&O x4, she eating well but not quite the same, the dizziness has causes nausea and vomiting despite taking emesis medication.   Pt denies any fevers, she feel last Monday while at PT.

## 2022-08-21 NOTE — ED NOTES
Assumed care of patient   Zoey ROBERTSON notified of patient not having a line, stated that patient can go to CT before attempting US IV   Pt to CT via cart in stable condition      Umair Carranza RN  08/20/22 8739

## 2022-08-21 NOTE — ED NOTES
Patient given Dc instructions education   Up with steady gait with wheeled walker   Denies questions        Artem Marley RN  08/21/22 6533

## 2022-08-21 NOTE — ED NOTES
Patient c/o left knee pain and increased weakness x1 week since a fall 1 week ago while at rehab. Left knee is warm to the touch. Extremities equal and strong. Respirations equal and unlabored. Patient had a temporary dialysis catheter placed in left chest recently, home health nurse is concerned for possible infection.       Bay Gonsales RN  08/20/22 2042

## 2022-08-21 NOTE — ED PROVIDER NOTES
Denies clot history. No lower extremity edema. REVIEW OF SYSTEMS       Review of Systems   Constitutional:  Positive for appetite change and fatigue. Negative for chills and fever. HENT:  Negative for congestion. Eyes:  Negative for photophobia and visual disturbance. Respiratory:  Negative for cough and shortness of breath. Cardiovascular:  Negative for chest pain and leg swelling. Gastrointestinal:  Positive for abdominal pain, nausea and vomiting. Negative for blood in stool, constipation and diarrhea. Genitourinary:  Negative for difficulty urinating, dysuria and hematuria. Musculoskeletal:  Positive for arthralgias and gait problem. Negative for joint swelling, myalgias, neck pain and neck stiffness. Skin:  Negative for rash and wound. Neurological:  Positive for dizziness, weakness (generalized) and light-headedness. Negative for tremors, seizures, syncope, speech difficulty, numbness and headaches. Psychiatric/Behavioral:  Negative for confusion. Except as noted above the remainder of the review of systems was reviewed and negative.        PAST MEDICAL HISTORY     Past Medical History:   Diagnosis Date    Angina at rest [facility]) 5/24/2020    Anxiety     CAD S/P percutaneous coronary angioplasty 2015, 2018    stents per dr Campos Number    CHF (congestive heart failure) ([address]     CKD (chronic kidney disease) stage 4, GFR 15-29 ml/min ([address] 2/24/2018    CKD stage 4 due to type 2 diabetes mellitus ([address]     Contusion of right chest wall 2/16/2021    COPD (chronic obstructive pulmonary disease) ([address]     Diabetic nephropathy with proteinuria ([address] 2014    DJD (degenerative joint disease) of knee     Dr Jonana Cardenas    GERD (gastroesophageal reflux disease)     Hemiparesis, left ([address] 2013    entered [facility])    Hemodialysis patient [facility])     Hemodialysis-associated hypotension 10/22/2021    History of heart failure     History of seizures     History of type C viral hepatitis     HTN (hypertension)     Hyperlipidemia     Impaired mobility and activities of daily living     Infection of venous access port 2022    Mediastinal lymphadenopathy     Dr General Nieves    Metabolic syndrome     Moderate persistent asthma without complication 3/20/1686    Need for extended care facility 2021    Neurogenic urinary incontinence 2013    Neuropathy in diabetes [facility])     Nonrheumatic mitral valve regurgitation 2021    Nonrheumatic tricuspid valve regurgitation 2021    Obesity (BMI 30-39. 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 Caridad Lam      x1 Dr. Sarai Aranda, Dr Remy Troy  08/10/2021    [facility]    DIAGNOSTIC CARDIAC CATH LAB PROCEDURE  10/02/2019    DIALYSIS CATHETER INSERTION Left 2022    Tunneled Symetrex 15.5F x 23cm hemodialysis catheter inserted by Dr. Kae Asher Left 2022    [id number] replamcent tunneld HD cath by Dr. Pauly Smith, [address] (CERVIX REMOVED)      one ovary intact, Dr Cayla Hayes, menorrhagia    IR THROMBECTOMY PERCUT AVF  2022    IR THROMBECTOMY PERCUT AVF 2022 MLOZ SPECIAL needed for Nausea or VomitingHistorical Med      sertraline (ZOLOFT) 50 MG tablet Take 1 tablet by mouth nightly, Disp-30 tablet, R-5Normal      trimethobenzamide (TIGAN) 100 MG/ML injection Inject 2 mLs into the muscle every 6 hours as needed for Nausea, Disp-100 mL, R-0Normal      Vitamin D (CHOLECALCIFEROL) 50 MCG (2000 UT) TABS tablet Take 1 tablet by mouth Daily with supper, Disp-60 tablet, R-5Labeling may look different. 25 mcg=1000 Units. Please double check dosages. Normal      atorvastatin (LIPITOR) 40 MG tablet Take 1 tablet by mouth nightly, Disp-30 tablet, R-5Normal      NYAMYC 872023 UNIT/GM powder APPLY TO AFFECTED AREA OF ABDOMINAL FOLDS EVERY 12 HOURS AS NEEDED, Disp-60 g, R-5Normal      magnesium oxide (MAG-OX) 400 MG tablet TAKE 1 TABLET BY MOUTH DAILY, Disp-30 tablet, R-12Normal      melatonin 10 MG CAPS capsule Take 1 capsule by mouth nightly, Disp-30 capsule, R-12Normal      !! midodrine (PROAMATINE) 5 MG tablet Take 1 tablet by mouth one time a day every Tue, Thu, Sat for hypotension. Give 30 mins prior to dialysis. , Disp-90 tablet, R-3Normal      Handicap [facility] Starting Wed 3/16/2022, Disp-1 each, R-0, PrintExpiration in 5 years. lidocaine-prilocaine (EMLA) 2.5-2.5 % cream Apply topically as needed for Pain Apply topically as needed. 3 times a week before dialysis wrap with saran wrap, Topical, PRN, Historical Med      albuterol (PROVENTIL) (2.5 MG/3ML) 0.083% nebulizer solution Take 3 mLs by nebulization every 4 hours as needed for Wheezing, Disp-120 each, R-3Print      !! blood glucose test strips (ONETOUCH VERIO) strip QID, Disp-300 each, R-3Normal      !!  OneTouch Delica Lancets 43Y MISC QID, Disp-300 each, R-3Normal      Blood Glucose Monitoring Suppl (ONETOUCH VERIO) w/Device KIT AS DIRECTED, Disp-1 kit, R-0Normal      Insulin Pen Needle (SURE COMFORT PEN NEEDLES) 30G X 8 MM MISC USE AS DIRECTED FIVE TIMES A DAILY, Disp-100 each, R-10Normal      aspirin EC 81 MG EC tablet Take 1 tablet by mouth daily, Disp-30 tablet, R-12Normal      !! blood glucose test strips (FREESTYLE LITE) strip 1 each by Does not apply route 4 times daily (before meals and nightly) As needed. , Disp-200 strip, R-5**Patient requests 90 days supply**Normal      Alcohol Swabs (EASY TOUCH ALCOHOL PREP MEDIUM) 70 % PADS Disp-100 each, R-3, NormalUSE AS DIRECTED THREE TIMES A DAY      !! FreeStyle Lancets MISC Disp-150 each, R-3, NormalTest 4x daily      acetaminophen (TYLENOL) 325 MG tablet Take 2 tablets by mouth every 4 hours as needed for Pain (For mild to moderate pain (Pain 1-6 out of 10 on pain scale)), Disp-120 tablet, R-0Print      Blood Glucose Monitoring Suppl (FREESTYLE LITE) [place]ETTA DAILY PRN Starting Tue 12/29/2020, Disp-1 Device, R-0, NormalUse freestyle meter to test blood sugar as needed        !! - Potential duplicate medications found. Please discuss with provider. ALLERGIES     Codeine and Oxycontin [oxycodone hcl]    FAMILY HISTORY       Family History   Problem Relation Age of Onset    Cancer Mother 76        survived    Breast Cancer Mother     Hypertension Father     Diabetes Sister     Mental Illness Sister     Colon Cancer Neg Hx           SOCIAL HISTORY       Social History     Socioeconomic History    Marital status:      Spouse name: None    Number of children: 2    Years of education: None    Highest education level: None   Occupational History    Occupation: disabled   Tobacco Use    Smoking status: Never    Smokeless tobacco: Never   Vaping Use    Vaping Use: Never used   Substance and Sexual Activity    Alcohol use: No     Alcohol/week: 0.0 standard drinks    Drug use: No    Sexual activity: Not Currently   Social History Narrative    Disabled dt depression and low back pain, lives in [facility] Aubree Cortes is close by and is her paid caregiver via waiver services    HD via [employer], [id number], PETÄJÄVESI, Sat.     Son is in the home and has CP and is total care-and is also cared for by a waiver by Gin Encinas. 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 Per daughter, her brother is blind, MRDD, CP multiple health issues. Daughter's  is patient's legal guardian. Patient has hemodialysis Tuesday Thursday and Saturday. Patient's bedroom is on main floor with a half bath. Daughter walks patient upstairs once weekly for full bath. Patient is using her walker in the home. Patient has a hospital bed in the home. Social Determinants of Health     Financial Resource Strain: Low Risk     Difficulty of Paying Living Expenses: Not hard at all   Food Insecurity: No Food Insecurity    Worried About [facility] in the Last Year: Never true    [facility] in the Last Year: Never true   Transportation Needs: No Transportation Needs    Lack of Transportation (Medical): No    Lack of Transportation (Non-Medical): No   Physical Activity: Sufficiently Active    Days of Exercise per Week: 3 days    Minutes of Exercise per Session: 60 min         PHYSICAL EXAM        ED Triage Vitals [08/20/22 2001]   BP Temp Temp Source Heart Rate Resp SpO2 Height Weight   105/66 97.9 °F (36.6 °C) Oral 87 16 96 % 5' 7\" (1.702 m) 195 lb (88.5 kg)       Physical Exam  Constitutional:       General: She is not in acute distress. Appearance: Normal appearance. She is ill-appearing. HENT:      Head: Normocephalic and atraumatic. No abrasion, contusion, masses or laceration. Jaw: There is normal jaw occlusion. Right Ear: External ear normal.      Left Ear: External ear normal.      Nose: Nose normal. No signs of injury or nasal tenderness. Mouth/Throat:      Mouth: Mucous membranes are moist.      Pharynx: Oropharynx is clear. Eyes:      General: Lids are normal. Vision grossly intact. No visual field deficit. Extraocular Movements: Extraocular movements intact. Right eye: Normal extraocular motion. Left eye: Normal extraocular motion. Conjunctiva/sclera: Conjunctivae normal.      Pupils: Pupils are equal, round, and reactive to light. Pupils are equal.   Cardiovascular:      Rate and Rhythm: Normal rate and regular rhythm. Pulses:           Femoral pulses are 2+ on the right side and 2+ on the left side. Popliteal pulses are 2+ on the right side and 2+ on the left side. Dorsalis pedis pulses are 2+ on the right side and 2+ on the left side. Posterior tibial pulses are 2+ on the right side and 2+ on the left side. Pulmonary:      Effort: Pulmonary effort is normal. No respiratory distress. Breath sounds: Normal breath sounds. Chest:      Chest wall: No tenderness. Abdominal:      General: Bowel sounds are normal. There is no distension. Palpations: Abdomen is soft. Tenderness: There is abdominal tenderness in the left upper quadrant. There is no right CVA tenderness, left CVA tenderness, guarding or rebound. Musculoskeletal:         General: Normal range of motion. Cervical back: Normal range of motion. No edema, erythema, signs of trauma, rigidity, tenderness or crepitus. No spinous process tenderness or muscular tenderness. Normal range of motion. Thoracic back: Normal.      Lumbar back: Tenderness present. No swelling, deformity, signs of trauma or bony tenderness. Back:       Comments: Right flank tenderness reported s/p fall, no bruising or ecchymosis or evidence of injury at present    Skin:     General: Skin is warm. Findings: Abrasion present. Comments: Multiple left forearm abrasions patient reports from dialysis, non traumatic   Neurological:      Mental Status: She is alert and oriented to person, place, and time. GCS: GCS eye subscore is 4. GCS verbal subscore is 5. GCS motor subscore is 6. Cranial Nerves: Cranial nerves are intact. Sensory: Sensation is intact. Motor: Motor function is intact. Coordination: Coordination is intact.    Psychiatric:         Mood and Affect: Mood normal.         Behavior: Behavior normal.         LABS:  Labs Reviewed   CULTURE, URINE - Abnormal; Notable for the following components:       Result Value    Urine Culture, Routine   (*)     Value: Performed at [address] [place] [address], [address] [place] [address]  ([phone]      Organism Escherichia coli (*)     All other components within normal limits    Narrative:     ORDER#: [id number]                          ORDERED BY: CONNIE GONZALEZ  SOURCE: Urine Clean Catch                  COLLECTED:  08/21/22 01:38  ANTIBIOTICS AT [place].:                      RECEIVED :  08/21/22 01:38   COMPREHENSIVE METABOLIC PANEL - Abnormal; Notable for the following components:    Sodium 132 (*)     Chloride 90 (*)     CO2 32 (*)     Glucose 309 (*)     Creatinine 3.00 (*)     GFR Non- 15.7 (*)     GFR  19.0 (*)     Calcium 10.0 (*)     Alkaline Phosphatase 152 (*)     Globulin 3.9 (*)     All other components within normal limits   CBC WITH AUTO DIFFERENTIAL - Abnormal; Notable for the following components:    RBC 3.27 (*)     Hemoglobin 9.9 (*)     Hematocrit 29.4 (*)     RDW 16.2 (*)     Neutrophils Absolute 7.2 (*)     All other components within normal limits   TROPONIN - Abnormal; Notable for the following components:    Troponin 0.036 (*)     All other components within normal limits    Narrative:     Torri Levi tel. [phone],  Chemistry results called to and read back by  [facility], 08/20/2022 22:45, by  [facility] TO CULTURE - Abnormal; Notable for the following components:    Color, UA DARK YELLOW (*)     Clarity, UA TURBID (*)     Glucose, Ur 500 (*)     Ketones, Urine TRACE (*)     Blood, Urine SMALL (*)     Protein,  (*)     Leukocyte Esterase, Urine LARGE (*)     All other components within normal limits   PROCALCITONIN - Abnormal; Notable for the following components:    Procalcitonin 0.22 (*)     All other components within normal limits    Narrative:     Torri Levi tel. [phone],  Chemistry results called to and read back by  [facility], 08/20/2022 22:45, by  [facility] - Abnormal; Notable for the following components:    Bacteria, UA MANY (*)     Yeast, UA Present (*)     WBC, UA >100 (*)     RBC, UA 10-20 (*)     All other components within normal limits   CULTURE, BLOOD 1    Narrative:     ORDER#: [id number]                          ORDERED BY: CONNIE GONZALEZ  SOURCE: Blood                              COLLECTED:  08/20/22 22:03  ANTIBIOTICS AT [facility].:                      RECEIVED :  08/20/22 22:03   CULTURE, BLOOD 2    Narrative:     ORDER#: [id number]                          ORDERED BY: CONNIE GONZALEZ  SOURCE: Blood                              COLLECTED:  08/20/22 22:02  ANTIBIOTICS AT [facility].:                      RECEIVED :  08/20/22 22:02   LACTIC ACID   MAGNESIUM         MDM:   Vitals:    Vitals:    08/20/22 2030 08/20/22 2230 08/20/22 2324 08/21/22 0030   BP: 130/63 128/62 (!) 104/53 (!) 145/67   Pulse:  77 74    Resp:  17 17    Temp:       TempSrc:       SpO2:  100% 100% 98%   Weight:       Height:           64-year-old female patient to the Emergency Department with complaint of primarily generalized weakness.   Patient with multiple medical comorbidities and problems. Patient with recent hospitalizations is following with outpatient physical therapy for weakness concerns. Patient Afebrile, VSS. Ct head negative for acute process. Patient with recent discontinuation of IV vancomycin. Presentation today not consistent with systemic infection. Pt does have unilateral LLE edema and pain, negative DVT study ultrasound. Troponin consistent with baseline and no acute ischemic EKG changes. Negative CT chest. CT chest noting previously described T10-12 fractures. Electrolytes and Cr 3.0 consistent with baseline. Discussed with patient can be admitted for placement as there is no medical indication for admission at this time. Discussed with Dr. Alannah Solitario feel this is appropriate. Patient and daughter at bedside decline admission for placement patient stating she wishes to go home and continue with outpatient management at this time. She understands return precautions and agrees to plan of care. CRITICAL CARE TIME   Total CriticalCare time was 0 minutes, excluding separately reportable procedures. There was a high probability of clinically significant/life threatening deterioration in the patient's condition which required my urgent intervention. PROCEDURES:  Unlessotherwise noted below, none      Procedures      FINAL IMPRESSION      1. Generalized weakness    2. Acute pain of left knee    3. Non-intractable vomiting with nausea, unspecified vomiting type    4.  Fall, initial encounter          DISPOSITION/PLAN   DISPOSITION Decision To Discharge 08/21/2022 01:04:37 AM          Tresa Blizzard, PA (electronically signed)  Attending Emergency Physician          Tresa Blizzard, [place]  08/29/22 [id number]

## 2022-08-23 ENCOUNTER — CARE COORDINATION (OUTPATIENT)
Dept: CARE COORDINATION | Age: 62
End: 2022-08-23

## 2022-08-23 LAB
ORGANISM: ABNORMAL
URINE CULTURE, ROUTINE: ABNORMAL
URINE CULTURE, ROUTINE: ABNORMAL

## 2022-08-23 NOTE — CARE COORDINATION
ACM spoke to patient's daughter Alexis Hendrix. Patient is home. Patient has switched primary care to visiting physicians group. Per daughter they will be receiving all primary care through visiting physicians group. No care coordination needs. ACM recommended daughter asked for  with visiting physicians group. Patient may benefit from palliative care, or hospice care in the future. Daughter verbalized understanding.

## 2022-08-24 ENCOUNTER — TELEPHONE (OUTPATIENT)
Dept: FAMILY MEDICINE CLINIC | Age: 62
End: 2022-08-24

## 2022-08-25 ENCOUNTER — TELEPHONE (OUTPATIENT)
Dept: FAMILY MEDICINE CLINIC | Age: 62
End: 2022-08-25

## 2022-08-25 NOTE — TELEPHONE ENCOUNTER
I called patient to talk to her about [facility]  and if she went through them and I stated that this company stated patient was not a patient of  [facility] anymore. Patient stated it was personal. I said thank you for the information and to have a great day. Patient was short and said bye.

## 2022-08-26 LAB
BLOOD CULTURE, ROUTINE: NORMAL
CULTURE, BLOOD 2: NORMAL

## 2022-08-29 DIAGNOSIS — I10 ESSENTIAL (PRIMARY) HYPERTENSION: ICD-10-CM

## 2022-08-29 RX ORDER — ISOSORBIDE MONONITRATE 30 MG/1
TABLET, EXTENDED RELEASE ORAL
Qty: 120 TABLET | Refills: 10 | Status: SHIPPED | OUTPATIENT
Start: 2022-08-29

## 2022-08-29 NOTE — TELEPHONE ENCOUNTER
Please approve or deny this refill request. The order is pended. Thank you.     LOV 8/16/2022    Next Visit Date:  Future Appointments   Date Time Provider Aminata Cortes   11/16/2022  3:00 PM Andrei Camacho  [address]   11/22/2022  2:00 PM Joel Flores MD [facility]

## 2022-09-22 ENCOUNTER — TELEPHONE (OUTPATIENT)
Dept: CARDIOLOGY CLINIC | Age: 62
End: 2022-09-22

## 2022-09-22 NOTE — TELEPHONE ENCOUNTER
SPOKE WITH PATIENT AND SHE STATES THAT SHE HAS NOT BEEN ON THIS MEDICATION,. STATES IT WAS STOPPED AWHILE AGO.  IT IS NOT ON HER MED LIST CURRENTLY

## 2022-09-22 NOTE — TELEPHONE ENCOUNTER
Rhys Bonilla (Dr. Walsh Jackson office) received the cardiac clearance paperwork-  The patient needs to know if she should hold the Brilinta? If so, how many days before and when to resume taking?   Please call the patient and Rhys Bonilla to advise

## 2022-09-28 ENCOUNTER — OFFICE VISIT (OUTPATIENT)
Dept: CARDIOLOGY CLINIC | Age: 62
End: 2022-09-28
Payer: MEDICARE

## 2022-09-28 VITALS — HEART RATE: 82 BPM | SYSTOLIC BLOOD PRESSURE: 120 MMHG | DIASTOLIC BLOOD PRESSURE: 80 MMHG

## 2022-09-28 DIAGNOSIS — Z95.1 S/P SINGLE VESSEL CORONARY ARTERY BYPASS: ICD-10-CM

## 2022-09-28 DIAGNOSIS — I25.119 CORONARY ARTERY DISEASE INVOLVING NATIVE CORONARY ARTERY OF NATIVE HEART WITH ANGINA PECTORIS (HCC): ICD-10-CM

## 2022-09-28 DIAGNOSIS — Z01.818 PRE-OPERATIVE CLEARANCE: Primary | ICD-10-CM

## 2022-09-28 DIAGNOSIS — I10 HYPERTENSION, UNSPECIFIED TYPE: ICD-10-CM

## 2022-09-28 DIAGNOSIS — I31.39 PERICARDIAL EFFUSION: ICD-10-CM

## 2022-09-28 DIAGNOSIS — I25.119 CORONARY ARTERY DISEASE INVOLVING NATIVE HEART WITH ANGINA PECTORIS, UNSPECIFIED VESSEL OR LESION TYPE (HCC): ICD-10-CM

## 2022-09-28 DIAGNOSIS — R26.2 DIFFICULTY IN WALKING: ICD-10-CM

## 2022-09-28 DIAGNOSIS — I10 ESSENTIAL (PRIMARY) HYPERTENSION: ICD-10-CM

## 2022-09-28 DIAGNOSIS — E78.2 MIXED HYPERLIPIDEMIA: ICD-10-CM

## 2022-09-28 DIAGNOSIS — R00.0 TACHYCARDIA: ICD-10-CM

## 2022-09-28 DIAGNOSIS — I10 ESSENTIAL HYPERTENSION: ICD-10-CM

## 2022-09-28 DIAGNOSIS — I95.3 HEMODIALYSIS-ASSOCIATED HYPOTENSION: ICD-10-CM

## 2022-09-28 PROCEDURE — 93000 ELECTROCARDIOGRAM COMPLETE: CPT | Performed by: INTERNAL MEDICINE

## 2022-09-28 PROCEDURE — 99214 OFFICE O/P EST MOD 30 MIN: CPT | Performed by: INTERNAL MEDICINE

## 2022-09-28 PROCEDURE — 1036F TOBACCO NON-USER: CPT | Performed by: INTERNAL MEDICINE

## 2022-09-28 PROCEDURE — G8427 DOCREV CUR MEDS BY ELIG CLIN: HCPCS | Performed by: INTERNAL MEDICINE

## 2022-09-28 PROCEDURE — G8417 CALC BMI ABV UP PARAM F/U: HCPCS | Performed by: INTERNAL MEDICINE

## 2022-09-28 PROCEDURE — 3017F COLORECTAL CA SCREEN DOC REV: CPT | Performed by: INTERNAL MEDICINE

## 2022-09-28 ASSESSMENT — ENCOUNTER SYMPTOMS
EYES NEGATIVE: 1
COUGH: 0
CHEST TIGHTNESS: 0
NAUSEA: 0
BLOOD IN STOOL: 0
WHEEZING: 0
STRIDOR: 0
SHORTNESS OF BREATH: 0
GASTROINTESTINAL NEGATIVE: 1
RESPIRATORY NEGATIVE: 1

## 2022-09-28 NOTE — PROGRESS NOTES
Subsequent Progress Note  Patient: Ilia Retana  YOB: 1960  MRN: [id number]    Chief Complaint: fall CAD HTN HPL  Chief Complaint   Patient presents with    Cardiac Clearance     LEFT KNEE SURGERY         CV Data:  6/2020 Echo EF 60  Mild mR  RVSP 46   7/2020 LAD DEWEY. She has prior stents as well.   8/21 Echo EF 60  8/2021 LAD recurrent ISR with double layer stents. Went to [facility] and had redo stent. 12/21 CUS B/l ICA 50-69  12/21 Spec tnegative  1/12/22 Cath LAD IST   1/2022 CABG LIMA - LAD + TV Repair complicated by Tamponade and pericardial window. 4/25/22 eCHO ef 60 NO pe rvsp 68  7/22 NOELLE normal LVEF no PFO no THrombus    HD T Th Sat  Subjective/HPI: TELEHEALTH EVALUATION -- Audio/Visual (During QKHUQ-30 public health emergency)    Pt is at a nursing home now. No cp occ SOB no falls no bleed. Takes meds. Recently Plavix stopped and Brilinta added. Had recerrnet admissions for CP    10/28/2020 started HD( T Thur and Sat). Past 3 days gaine ~ 10 LBS according to her scale at home and HD. She is not short of breath and denies CP. She is here due to discrepancy in weight. 2/10/21 TELEHEALTH EVALUATION -- Audio/Visual (During SVJEF-21 public health emergency)    At [facility]. No further falls no cp some sob eats well takes meds. Will go home net week. Doing well w HD    9/10/21 after LAD stent at [facility] feels better no cp no sob no falls no bleed. 10/27/21 recent er for CP and fall. BP was low. Some parikh. No bleed. Takes meds. Often dizzy    3/24/22 had CABG + TV repairand Pericaridal window. Ws in hosp > 2 week. s weak and tired. starign to move some. 4/25/22 still with sternal discomfort with motion. No angina. No sob no falls no bleed. 8/16/22 recent [address] for sepsis and HD catheter infection. Removed and received new one. Having  sharp noncardiac CP no sob able to walk some    9/28/22 doing well no cp no sob minimal walks on HD.  Will get AV fistula at [facility]    EKG: SR 82 RBBB    LIFE LONG Nonsmoker  Lives with daughter      Past Medical History:   Diagnosis Date    Angina at rest [facility]) 5/24/2020    Anxiety     CAD S/P percutaneous coronary angioplasty 2015, 2018    stents per dr Morelia Anderson    CHF (congestive heart failure) ([address]     CKD (chronic kidney disease) stage 4, GFR 15-29 ml/min ([address] 2/24/2018    CKD stage 4 due to type 2 diabetes mellitus ([address]     Contusion of right chest wall 2/16/2021    COPD (chronic obstructive pulmonary disease) ([address]     Diabetic nephropathy with proteinuria ([address] 2014    DJD (degenerative joint disease) of knee     Dr Luisa Koch    GERD (gastroesophageal reflux disease)     Hemiparesis, left ([address] 2013    entered Assisted Living ([facility])    Hemodialysis patient [facility])     Hemodialysis-associated hypotension 10/22/2021    History of heart failure     History of seizures     History of type C viral hepatitis     HTN (hypertension)     Hyperlipidemia     Impaired mobility and activities of daily living     Infection of venous access port 7/29/2022    Mediastinal lymphadenopathy 2013    Dr Tessa Cervantes    Metabolic syndrome     Moderate persistent asthma without complication 4/08/8392    Need for extended care facility 7/7/2021    Neurogenic urinary incontinence 2013    Neuropathy in diabetes ([address]     Nonrheumatic mitral valve regurgitation 7/7/2021    Nonrheumatic tricuspid valve regurgitation 7/7/2021    Obesity (BMI 30-39. 9)     Recurrent UTI     S/P colonoscopy 2014    [facility], focal active colitis    Schizophrenia, paranoid, chronic ([address]     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 Radha Tucker      x1 Dr. Lori Clements, Dr Chino Congress  08/10/2021    [facility]    DIAGNOSTIC CARDIAC CATH LAB PROCEDURE  10/02/2019    DIALYSIS CATHETER INSERTION Left 2022    Tunneled Symetrex 15.5F x 23cm hemodialysis catheter inserted by Dr. Valentina Boykin Left 2022    [id number] replamcent tunneld HD cath by Dr. Kiya Daniel, TOTAL ABDOMINAL (CERVIX REMOVED)      one ovary intact, Dr Jenna Pappas, menorrhagia    IR THROMBECTOMY PERCUT AVF  2022    IR THROMBECTOMY PERCUT AVF 2022 [facility] PROCEDURE    IR TUNNELED CATHETER PLACEMENT GREATER THAN 5 YEARS  2022    IR TUNNELED CATHETER PLACEMENT GREATER THAN 5 YEARS 6/3/2022 [facility] PROCEDURE    IR TUNNELED CATHETER PLACEMENT GREATER THAN 5 YEARS Left 2022 Dr. Barr Mode exchanged to 15.5F x 28 cm.      15.5 fr by 23 cm Symetrex dialysis catheter inserted by Dr Barr Mode    IR TUNNELED [address] 5 YEARS  2022    IR TUNNELED CATHETER PLACEMENT GREATER THAN 5 YEARS 2022 [facility] SPECIAL PROCEDURE    SD TOTAL KNEE ARTHROPLASTY Left 2018    LEFT KNEE TOTAL KNEE ARTHROPLASTY, SHAYNA, NERVE BLOCK performed by Cortney Hernandez MD at [address] [place], [place] [address] Floor Right     TOTAL KNEE ARTHROPLASTY  2016    Dr Francesco Zhu    TUNNELED [address] Right 2020    tunneled HD catheter per Dr Barr Mode       Family History   Problem Relation Age of Onset    Cancer Mother 76        survived    Breast Cancer Mother     Hypertension Father     Diabetes Sister     Mental Illness Sister     Colon Cancer Neg Hx        Social History Socioeconomic History    Marital status:     Number of children: 2   Occupational History    Occupation: disabled   Tobacco Use    Smoking status: Never    Smokeless tobacco: Never   Vaping Use    Vaping Use: Never used   Substance and Sexual Activity    Alcohol use: No     Alcohol/week: 0.0 standard drinks    Drug use: No    Sexual activity: Not Currently   Social History Narrative    Disabled dt depression and low back pain, lives in [facility] [place] is close by and is her paid caregiver via waiver services    HD via [employer], [id number], PETÄJÄVESI, Sat. Son is in the home and has CP and is total care-and is also cared for by a waiver by Saima Johns. Pt was born in [place], one of [facility] a twin sister Jose Luis Dickinson, very ill in 2018, [place] [phone]    Moved to [place], , 2 children, one son (disabled with CP) and one daughter Jose Crys at [facility], as a nurse's aide Disabled due to mental illness and back pain    Lived at [facility], was discharged, returned to independent living in 2017 in the daughter's house and has adjusted well    One son and one daughter, live in the same house with patient, Elyse Glynn pays the rent    Prized reading ([employer])             10/11/2021 [facility] updates; patient lives with her daughter son-in-law and 2 grandchildren and patient's handicapped son. Per daughter, her brother is blind, MRDD, CP multiple health issues. Daughter's  is patient's legal guardian. Patient has hemodialysis Tuesday Thursday and Saturday. Patient's bedroom is on main floor with a half bath. Daughter walks patient upstairs once weekly for full bath. Patient is using her walker in the home. Patient has a hospital bed in the home.      Social Determinants of Health     Financial Resource Strain: Low Risk     Difficulty of Paying Living Expenses: Not hard at all   Food Insecurity: No Food Insecurity    Worried About [address] in the Last Year: Never true    Ran Out of Food in the Last Year: Never true   Transportation Needs: No Transportation Needs    Lack of Transportation (Medical): No    Lack of Transportation (Non-Medical): No   Physical Activity: Sufficiently Active    Days of Exercise per Week: 3 days    Minutes of Exercise per Session: 60 min       Allergies   Allergen Reactions    Codeine Hives     hives    Oxycontin [Oxycodone Hcl] Hives       Current Outpatient Medications   Medication Sig Dispense Refill    isosorbide mononitrate (IMDUR) 30 MG extended release tablet TAKE FOUR (4) TABLETS BY MOUTH DAILY 120 tablet 10    ARIPiprazole (ABILIFY) 5 MG tablet TAKE 1 TABLET BY MOUTH ONCE DAILY 30 tablet 10    midodrine (PROAMATINE) 10 MG tablet TAKE 1 TABLET BY MOUTH ON DAYS OF DIALYSIS TREATMENT THREE TIMES WEEKLY 12 tablet 10    furosemide (LASIX) 80 MG tablet       insulin aspart (NOVOLOG FLEXPEN) 100 UNIT/ML injection pen INJECT 12 UNITS WITH EACH MEAL. 30 mL 3    LANTUS SOLOSTAR 100 UNIT/ML injection pen 70 units at bedtime 10 pen 10    nitroGLYCERIN (NITROSTAT) 0.4 MG SL tablet up to max of 3 total doses. If no relief after 1 dose, call 911. 25 tablet 3    B Complex-C-Folic Acid (MARK-KANDACE) TABS Take 1 tablet by mouth in the morning.       ondansetron (ZOFRAN) 4 MG tablet Take 4 mg by mouth every 8 hours as needed for Nausea or Vomiting      sertraline (ZOLOFT) 50 MG tablet Take 1 tablet by mouth nightly 30 tablet 5    trimethobenzamide (TIGAN) 100 MG/ML injection Inject 2 mLs into the muscle every 6 hours as needed for Nausea 100 mL 0    Vitamin D (CHOLECALCIFEROL) 50 MCG (2000 UT) TABS tablet Take 1 tablet by mouth Daily with supper 60 tablet 5    atorvastatin (LIPITOR) 40 MG tablet Take 1 tablet by mouth nightly 30 tablet 5    NYAMYC 540814 UNIT/GM powder APPLY TO AFFECTED AREA OF ABDOMINAL FOLDS EVERY 12 HOURS AS NEEDED 60 g 5    magnesium oxide (MAG-OX) 400 MG tablet TAKE 1 TABLET BY MOUTH DAILY 30 tablet 12    melatonin 10 MG CAPS capsule Take 1 capsule by mouth nightly 30 capsule 12    midodrine (PROAMATINE) 5 MG tablet Take 1 tablet by mouth one time a day every Tue, Thu, Sat for hypotension. Give 30 mins prior to dialysis. 90 tablet 3    Handicap Placard MISC by Does not apply route Expiration in 5 years. 1 each 0    lidocaine-prilocaine (EMLA) 2.5-2.5 % cream Apply topically as needed for Pain Apply topically as needed. 3 times a week before dialysis wrap with saran wrap      albuterol (PROVENTIL) (2.5 MG/3ML) 0.083% nebulizer solution Take 3 mLs by nebulization every 4 hours as needed for Wheezing 120 each 3    blood glucose test strips (ONETOUCH VERIO) strip  each 3    OneTouch Delica Lancets 52H MISC  each 3    Blood Glucose Monitoring Suppl (ONETOUCH VERIO) w/Device KIT AS DIRECTED 1 kit 0    Insulin Pen Needle (SURE COMFORT PEN NEEDLES) 30G X 8 MM MISC USE AS DIRECTED FIVE TIMES A DAILY 100 each 10    aspirin EC 81 MG EC tablet Take 1 tablet by mouth daily 30 tablet 12    blood glucose test strips (FREESTYLE LITE) strip 1 each by Does not apply route 4 times daily (before meals and nightly) As needed. 200 strip 5    Alcohol Swabs (EASY TOUCH ALCOHOL PREP MEDIUM) 70 % PADS USE AS DIRECTED THREE TIMES A  each 3    FreeStyle Lancets MISC Test 4x daily 150 each 3    acetaminophen (TYLENOL) 325 MG tablet Take 2 tablets by mouth every 4 hours as needed for Pain (For mild to moderate pain (Pain 1-6 out of 10 on pain scale)) 120 tablet 0    Blood Glucose Monitoring Suppl (FREESTYLE LITE) CORETTA 1 Device by Does not apply route daily as needed (Diabetes) Use freestyle meter to test blood sugar as needed 1 Device 0     No current facility-administered medications for this visit. Review of Systems:   Review of Systems   Constitutional: Negative. Negative for diaphoresis and fatigue. HENT: Negative. Eyes: Negative. Respiratory: Negative.   Negative for cough, chest tightness, shortness of breath, wheezing and stridor. Cardiovascular: Negative. Negative for chest pain, palpitations and leg swelling. Gastrointestinal: Negative. Negative for blood in stool and nausea. Genitourinary: Negative. Musculoskeletal: Negative. Skin: Negative. Neurological: Negative. Negative for dizziness, syncope, weakness and light-headedness. Hematological: Negative. Psychiatric/Behavioral: Negative. Physical Examination:    /80 (Site: Right Upper Arm, Position: Sitting, Cuff Size: Large Adult)   Pulse 82   LMP  (LMP Unknown)    Physical Exam   Constitutional: She appears healthy. No distress. HENT:   Normal cephalic and Atraumatic   Eyes: Pupils are equal, round, and reactive to light. Neck: Thyroid normal. No JVD present. No neck adenopathy. No thyromegaly present. Cardiovascular: Normal rate, regular rhythm, intact distal pulses and normal pulses. Murmur heard. Pulmonary/Chest: Effort normal and breath sounds normal. She has no wheezes. She has no rales. She exhibits no tenderness. Abdominal: Soft. Bowel sounds are normal. There is no abdominal tenderness. Musculoskeletal:         General: No tenderness or edema. Normal range of motion. Cervical back: Normal range of motion and neck supple. Neurological: She is alert and oriented to person, place, and time. Skin: Skin is warm. No cyanosis. Nails show no clubbing.      LABS:  CBC:   Lab Results   Component Value Date/Time    WBC 9.4 08/20/2022 08:45 PM    RBC 3.27 08/20/2022 08:45 PM    HGB 9.9 08/20/2022 08:45 PM    HCT 29.4 08/20/2022 08:45 PM    MCV 89.9 08/20/2022 08:45 PM    MCH 30.4 08/20/2022 08:45 PM    MCHC 33.8 08/20/2022 08:45 PM    RDW 16.2 08/20/2022 08:45 PM     08/20/2022 08:45 PM    MPV 10.0 03/05/2020 12:00 AM     Lipids:  Lab Results   Component Value Date    CHOL 70 04/09/2022    CHOL 123 01/13/2022    CHOL 101 06/11/2020     Lab Results   Component Value Date    TRIG 92 04/09/2022    TRIG 228 (H) 01/13/2022    TRIG 82 06/11/2020     Lab Results   Component Value Date    HDL 33 (L) 04/09/2022    HDL 39 (L) 01/13/2022    HDL 48 06/11/2020     Lab Results   Component Value Date    LDLCALC 19 04/09/2022    LDLCALC 38 01/13/2022    LDLCALC 37 06/11/2020     Lab Results   Component Value Date    LABVLDL 59.0 05/04/2013    VLDL 43 01/30/2017     Lab Results   Component Value Date    CHOLHDLRATIO 4.32 01/30/2017     CMP:    Lab Results   Component Value Date/Time     08/20/2022 08:45 PM    K 4.1 08/20/2022 08:45 PM    K 4.3 07/04/2022 03:07 AM    CL 90 08/20/2022 08:45 PM    CO2 32 08/20/2022 08:45 PM    BUN 15 08/20/2022 08:45 PM    CREATININE 3.00 08/20/2022 08:45 PM    GFRAA 19.0 08/20/2022 08:45 PM    LABGLOM 15.7 08/20/2022 08:45 PM    GLUCOSE 309 08/20/2022 08:45 PM    GLUCOSE 419 07/30/2021 08:16 AM    PROT 7.5 08/20/2022 08:45 PM    LABALBU 3.6 08/20/2022 08:45 PM    CALCIUM 10.0 08/20/2022 08:45 PM    BILITOT 0.6 08/20/2022 08:45 PM    ALKPHOS 152 08/20/2022 08:45 PM    AST 30 08/20/2022 08:45 PM    ALT 21 08/20/2022 08:45 PM     BMP:    Lab Results   Component Value Date/Time     08/20/2022 08:45 PM    K 4.1 08/20/2022 08:45 PM    K 4.3 07/04/2022 03:07 AM    CL 90 08/20/2022 08:45 PM    CO2 32 08/20/2022 08:45 PM    BUN 15 08/20/2022 08:45 PM    LABALBU 3.6 08/20/2022 08:45 PM    CREATININE 3.00 08/20/2022 08:45 PM    CALCIUM 10.0 08/20/2022 08:45 PM    GFRAA 19.0 08/20/2022 08:45 PM    LABGLOM 15.7 08/20/2022 08:45 PM    GLUCOSE 309 08/20/2022 08:45 PM    GLUCOSE 419 07/30/2021 08:16 AM     Magnesium:    Lab Results   Component Value Date/Time    MG 2.2 08/20/2022 08:45 PM     TSH:  Lab Results   Component Value Date    TSH 0.513 06/30/2022       Patient Active Problem List   Diagnosis    Atherosclerotic heart disease of native coronary artery with unspecified angina pectoris (HCC)    Schizophrenia, paranoid, chronic    Metabolic syndrome    Vitamin B 12 deficiency    Cerebral microvascular disease    Mixed hyperlipidemia    Other hammer toe (acquired)    Vitamin D insufficiency    Incontinence of urine    Diabetic nephropathy with proteinuria (HCC)    Essential (primary) hypertension    History of type C viral hepatitis    Urinary incontinence due to cognitive impairment    History of seizures    Stented coronary artery-plan is to stay on Plavix indefinately per Dr Georgie Clayton    Other specified diabetes mellitus with diabetic neuropathy, unspecified ([address]    Controlled type 2 diabetes mellitus with diabetic neuropathy, with long-term current use of insulin (HCC)    Hemiparesis, left (HCC)    Angina, class II ([address]    Pain, unspecified    Tardive dyskinesia    Shortness of breath    Uncontrolled type 2 diabetes mellitus with hyperglycemia (HCC)    Chronic diastolic congestive heart failure (HCC)    Sleep apnea, unspecified    Pulmonary hypertension, unspecified (HCC)    Class 2 severe obesity with serious comorbidity and body mass index (BMI) of 36.0 to 36.9 in adult [facility])    Edema    Closed supracondylar fracture of right humerus    Other chronic pain    Palliative care patient    Recurrent falls    Renal failure    Difficulty in walking    ESRD (end stage renal disease) on dialysis (HCC)    Weakness    Other seizures (HCC)    Moderate persistent asthma without complication    UVIXS-19    Post PTCA    Falls frequently    Chest wall contusion, left, initial encounter    Headache, unspecified    Paranoid schizophrenia ([address]    Compression fracture of spine ([address]    Closed rib fracture    Depression    Chronic obstructive pulmonary disease (HCC)    Critical illness polyneuropathy ([address]    Multiple closed fractures of ribs of right side    Nonrheumatic mitral valve regurgitation    Nonrheumatic tricuspid valve regurgitation    Need for extended care facility    Chronic pain of both shoulders    Hemodialysis-associated hypotension    Anginal chest pain at rest [facility])    Chest pain    Unstable angina (HCC)    NSTEMI (non-ST elevated myocardial infarction) ([facility]    CKD (chronic kidney disease) stage 4, GFR 15-29 ml/min ([facility])    Hyperkalemia    Impaired mobility and activities of daily living dt polyneuropathy and reccent fall     Dialysis patient [facility])    Unspecified open wound of right upper arm, initial encounter    Multiple closed fractures of right lower extremity and ribs    Closed T11 fracture ([facility])    Encephalopathy acute    MRSA bacteremia    Sepsis due to Enterococcus [facility])    Local infection due to central venous catheter    DJD (degenerative joint disease), cervical    Closed head injury    Sarcopenia    Fall from standing    Septicemia ([facility]    Chronic hepatitis C ([facility]    Catheter-related bloodstream infection    Enterococcus faecalis infection    Cervical stenosis of spinal canal    Ataxic gait    Lumbar stenosis with neurogenic claudication    Falls infrequently    Infection of venous access port       There are no discontinued medications. Modified Medications    No medications on file       No orders of the defined types were placed in this encounter. Assessment/Plan:    1. Coronary artery disease involving native heart with angina pectoris, unspecified vessel or lesion type ([facility]    2. CABG and TV repair with pericaridal window- repeat Echo. Will need Cardiac Rehab but not ready yet. Not ready for cardaic Rehab yet. Echo no further PE. --- stable no angina    2. Diastolic congestive heart failure, unspecified HF chronicity ([facility])  Stable. No edema. - continue meds. Low salt diet    3. Stented coronary artery    4. Shortness of breath   stable - no worse    5. Mixed hyperlipidemia  Statin - long term. Check labs. Low fat diet. 6. Essential hypertension BP low- stop Imdur 120 for now. 7. Coronary artery disease involving native coronary artery of native heart with angina pectoris ([facility]    8 dizzy/falls- CUS. - moderate B/L- will continue surveillance.       Pt cleared to proceed w AV Fistula    Counseling:  Heart Healthy Lifestyle, Low Salt Diet, Take Precautions to Prevent Falls and Walk Daily    Return in about 3 months (around 12/28/2022).       Electronically signed by Lorne Levine MD on 9/28/2022 at 2:05 PM

## 2022-10-03 RX ORDER — INSULIN GLARGINE 100 [IU]/ML
INJECTION, SOLUTION SUBCUTANEOUS
Qty: 15 ML | Refills: 10 | Status: SHIPPED | OUTPATIENT
Start: 2022-10-03

## 2022-10-06 ENCOUNTER — APPOINTMENT (OUTPATIENT)
Dept: GENERAL RADIOLOGY | Age: 62
End: 2022-10-06
Payer: MEDICARE

## 2022-10-06 ENCOUNTER — HOSPITAL ENCOUNTER (EMERGENCY)
Age: 62
Discharge: HOME | End: 2022-10-07
Payer: MEDICARE

## 2022-10-06 ENCOUNTER — APPOINTMENT (OUTPATIENT)
Dept: CT IMAGING | Age: 62
End: 2022-10-06
Payer: MEDICARE
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
## 2022-10-06 DIAGNOSIS — N30.00 ACUTE CYSTITIS WITHOUT HEMATURIA: ICD-10-CM

## 2022-10-06 DIAGNOSIS — S30.0XXA LUMBAR CONTUSION, INITIAL ENCOUNTER: ICD-10-CM

## 2022-10-06 LAB
BASOPHILS ABSOLUTE: 0.1 K/UL (ref 0–0.2)
BASOPHILS RELATIVE PERCENT: 0.6 %
EOSINOPHILS ABSOLUTE: 0.2 K/UL (ref 0–0.7)
EOSINOPHILS RELATIVE PERCENT: 2.3 %
ETHANOL PERCENT: NORMAL G/DL
ETHANOL: <10 MG/DL (ref 0–0.08)
HCT VFR BLD CALC: 31.1 % (ref 37–47)
HEMOGLOBIN: 10.9 G/DL (ref 12–16)
LYMPHOCYTES ABSOLUTE: 1.2 K/UL (ref 1–4.8)
LYMPHOCYTES RELATIVE PERCENT: 11.5 %
MCH RBC QN AUTO: 28.8 PG (ref 27–31.3)
MCHC RBC AUTO-ENTMCNC: 35.2 % (ref 33–37)
MCV RBC AUTO: 81.7 FL (ref 82–100)
MONOCYTES ABSOLUTE: 0.8 K/UL (ref 0.2–0.8)
MONOCYTES RELATIVE PERCENT: 8.1 %
NEUTROPHILS ABSOLUTE: 8 K/UL (ref 1.4–6.5)
NEUTROPHILS RELATIVE PERCENT: 77.5 %
PDW BLD-RTO: 18.1 % (ref 11.5–14.5)
PLATELET # BLD: 223 K/UL (ref 130–400)
RBC # BLD: 3.8 M/UL (ref 4.2–5.4)
SARS-COV-2, NAAT: NOT DETECTED
WBC # BLD: 10.4 K/UL (ref 4.8–10.8)

## 2022-10-06 PROCEDURE — 71045 X-RAY EXAM CHEST 1 VIEW: CPT

## 2022-10-06 PROCEDURE — 72131 CT LUMBAR SPINE W/O DYE: CPT

## 2022-10-06 PROCEDURE — 96375 TX/PRO/DX INJ NEW DRUG ADDON: CPT

## 2022-10-06 PROCEDURE — 87635 SARS-COV-2 COVID-19 AMP PRB: CPT

## 2022-10-06 PROCEDURE — 99285 EMERGENCY DEPT VISIT HI MDM: CPT

## 2022-10-06 PROCEDURE — 2580000003 HC RX 258: Performed by: PERSONAL EMERGENCY RESPONSE ATTENDANT

## 2022-10-06 PROCEDURE — 87088 URINE BACTERIA CULTURE: CPT

## 2022-10-06 PROCEDURE — 96374 THER/PROPH/DIAG INJ IV PUSH: CPT

## 2022-10-06 PROCEDURE — 80053 COMPREHEN METABOLIC PANEL: CPT

## 2022-10-06 PROCEDURE — 85025 COMPLETE CBC W/AUTO DIFF WBC: CPT

## 2022-10-06 PROCEDURE — 81001 URINALYSIS AUTO W/SCOPE: CPT

## 2022-10-06 PROCEDURE — 6360000002 HC RX W HCPCS: Performed by: PERSONAL EMERGENCY RESPONSE ATTENDANT

## 2022-10-06 PROCEDURE — 82077 ASSAY SPEC XCP UR&BREATH IA: CPT

## 2022-10-06 PROCEDURE — 87086 URINE CULTURE/COLONY COUNT: CPT

## 2022-10-06 PROCEDURE — 36415 COLL VENOUS BLD VENIPUNCTURE: CPT

## 2022-10-06 PROCEDURE — 87186 SC STD MICRODIL/AGAR DIL: CPT

## 2022-10-06 RX ORDER — MORPHINE SULFATE 4 MG/ML
4 INJECTION, SOLUTION INTRAMUSCULAR; INTRAVENOUS ONCE
Status: COMPLETED | OUTPATIENT
Start: 2022-10-06 | End: 2022-10-06

## 2022-10-06 RX ORDER — 0.9 % SODIUM CHLORIDE 0.9 %
500 INTRAVENOUS SOLUTION INTRAVENOUS ONCE
Status: COMPLETED | OUTPATIENT
Start: 2022-10-06 | End: 2022-10-07

## 2022-10-06 RX ORDER — ONDANSETRON 2 MG/ML
4 INJECTION INTRAMUSCULAR; INTRAVENOUS ONCE
Status: COMPLETED | OUTPATIENT
Start: 2022-10-06 | End: 2022-10-06

## 2022-10-06 RX ADMIN — ONDANSETRON 4 MG: 2 INJECTION INTRAMUSCULAR; INTRAVENOUS at 23:20

## 2022-10-06 RX ADMIN — SODIUM CHLORIDE 500 ML: 9 INJECTION, SOLUTION INTRAVENOUS at 23:16

## 2022-10-06 RX ADMIN — MORPHINE SULFATE 4 MG: 4 INJECTION, SOLUTION INTRAMUSCULAR; INTRAVENOUS at 23:21

## 2022-10-06 ASSESSMENT — ENCOUNTER SYMPTOMS
ABDOMINAL PAIN: 0
DIARRHEA: 0
BACK PAIN: 1
SORE THROAT: 0
BLOOD IN STOOL: 0
VOMITING: 0
RHINORRHEA: 0
COLOR CHANGE: 0
NAUSEA: 0
SHORTNESS OF BREATH: 0
COUGH: 0

## 2022-10-06 ASSESSMENT — PAIN - FUNCTIONAL ASSESSMENT: PAIN_FUNCTIONAL_ASSESSMENT: 0-10

## 2022-10-06 ASSESSMENT — PAIN SCALES - GENERAL
PAINLEVEL_OUTOF10: 8
PAINLEVEL_OUTOF10: 8

## 2022-10-06 ASSESSMENT — PAIN DESCRIPTION - LOCATION
LOCATION: BACK
LOCATION: BACK

## 2022-10-07 ENCOUNTER — HOSPITAL ENCOUNTER (OUTPATIENT)
Dept: NUCLEAR MEDICINE | Age: 62
Discharge: HOME | End: 2022-10-09
Payer: MEDICARE

## 2022-10-07 VITALS
TEMPERATURE: 97.3 F | BODY MASS INDEX: 29.82 KG/M2 | HEART RATE: 75 BPM | RESPIRATION RATE: 20 BRPM | SYSTOLIC BLOOD PRESSURE: 104 MMHG | DIASTOLIC BLOOD PRESSURE: 66 MMHG | HEIGHT: 67 IN | WEIGHT: 190 LBS | OXYGEN SATURATION: 99 %

## 2022-10-07 DIAGNOSIS — M25.569 ARTHRALGIA OF LOWER LEG, UNSPECIFIED LATERALITY: ICD-10-CM

## 2022-10-07 LAB
ALBUMIN SERPL-MCNC: 4 G/DL (ref 3.5–4.6)
ALP BLD-CCNC: 133 U/L (ref 40–130)
ALT SERPL-CCNC: 15 U/L (ref 0–33)
ANION GAP SERPL CALCULATED.3IONS-SCNC: 10 MEQ/L (ref 9–15)
AST SERPL-CCNC: 18 U/L (ref 0–35)
BACTERIA: ABNORMAL /HPF
BILIRUB SERPL-MCNC: 0.6 MG/DL (ref 0.2–0.7)
BILIRUBIN URINE: NEGATIVE
BLOOD, URINE: ABNORMAL
BUN BLDV-MCNC: 18 MG/DL (ref 8–23)
CALCIUM SERPL-MCNC: 10.8 MG/DL (ref 8.5–9.9)
CHLORIDE BLD-SCNC: 89 MEQ/L (ref 95–107)
CLARITY: ABNORMAL
CO2: 34 MEQ/L (ref 20–31)
COLOR: ABNORMAL
CREAT SERPL-MCNC: 3.22 MG/DL (ref 0.5–0.9)
EKG ATRIAL RATE: 75 BPM
EKG P AXIS: 87 DEGREES
EKG P-R INTERVAL: 236 MS
EKG Q-T INTERVAL: 464 MS
EKG QRS DURATION: 132 MS
EKG QTC CALCULATION (BAZETT): 518 MS
EKG R AXIS: -53 DEGREES
EKG T AXIS: 82 DEGREES
EKG VENTRICULAR RATE: 75 BPM
EPITHELIAL CELLS, UA: ABNORMAL /HPF (ref 0–5)
GFR AFRICAN AMERICAN: 17.5
GFR NON-AFRICAN AMERICAN: 14.4
GLOBULIN: 4.4 G/DL (ref 2.3–3.5)
GLUCOSE BLD-MCNC: 316 MG/DL (ref 70–99)
GLUCOSE URINE: 250 MG/DL
HYALINE CASTS: ABNORMAL /HPF (ref 0–5)
KETONES, URINE: ABNORMAL MG/DL
LEUKOCYTE ESTERASE, URINE: ABNORMAL
NITRITE, URINE: NEGATIVE
PH UA: 5.5 (ref 5–9)
POTASSIUM SERPL-SCNC: 3.9 MEQ/L (ref 3.4–4.9)
PROTEIN UA: 100 MG/DL
RBC UA: ABNORMAL /HPF (ref 0–5)
RENAL EPITHELIAL, UA: ABNORMAL /HPF
SODIUM BLD-SCNC: 133 MEQ/L (ref 135–144)
SPECIFIC GRAVITY UA: 1.02 (ref 1–1.03)
TOTAL PROTEIN: 8.4 G/DL (ref 6.3–8)
URINE REFLEX TO CULTURE: YES
UROBILINOGEN, URINE: 1 E.U./DL
WBC UA: >100 /HPF (ref 0–5)

## 2022-10-07 PROCEDURE — 78315 BONE IMAGING 3 PHASE: CPT

## 2022-10-07 PROCEDURE — 93005 ELECTROCARDIOGRAM TRACING: CPT | Performed by: PERSONAL EMERGENCY RESPONSE ATTENDANT

## 2022-10-07 PROCEDURE — 93010 ELECTROCARDIOGRAM REPORT: CPT | Performed by: INTERNAL MEDICINE

## 2022-10-07 PROCEDURE — 3430000000 HC RX DIAGNOSTIC RADIOPHARMACEUTICAL: Performed by: PHYSICIAN ASSISTANT

## 2022-10-07 PROCEDURE — 6370000000 HC RX 637 (ALT 250 FOR IP): Performed by: PERSONAL EMERGENCY RESPONSE ATTENDANT

## 2022-10-07 PROCEDURE — A9503 TC99M MEDRONATE: HCPCS | Performed by: PHYSICIAN ASSISTANT

## 2022-10-07 RX ORDER — CEPHALEXIN 500 MG/1
500 CAPSULE ORAL ONCE
Status: COMPLETED | OUTPATIENT
Start: 2022-10-07 | End: 2022-10-07

## 2022-10-07 RX ORDER — CEPHALEXIN 500 MG/1
500 CAPSULE ORAL DAILY
Qty: 7 CAPSULE | Refills: 0 | Status: SHIPPED | OUTPATIENT
Start: 2022-10-07 | End: 2022-10-14

## 2022-10-07 RX ORDER — TC 99M MEDRONATE 20 MG/10ML
25 INJECTION, POWDER, LYOPHILIZED, FOR SOLUTION INTRAVENOUS
Status: COMPLETED | OUTPATIENT
Start: 2022-10-07 | End: 2022-10-07

## 2022-10-07 RX ADMIN — TC 99M MEDRONATE 28.9 MILLICURIE: 20 INJECTION, POWDER, LYOPHILIZED, FOR SOLUTION INTRAVENOUS at 09:24

## 2022-10-07 RX ADMIN — CEPHALEXIN 500 MG: 500 CAPSULE ORAL at 02:35

## 2022-10-07 NOTE — FLOWSHEET NOTE
Patient to ct holding from nuc med. Pt to stay with RN until her repeat scan at 1230. Per tech, pt ok to eat, drinking encouraged. Pt provided crackers and coffee. Pt resting in WC with no complaints at this time. Call light in reach. Electronically signed by Amber Hamlin RN on 10/7/2022 at 10:01 AM      Patient falling asleep in [facility], patient offered cart, pt declines need for cart and continues to rest in [facility].  Electronically signed by Amber Hamlin RN on 10/7/2022 at 10:59 AM

## 2022-10-07 NOTE — ED TRIAGE NOTES
Patient presents to the er with complaints of falling at home  Chronic knee issues, her knee buckled up while family was trying to get her into bed and she hit her lower back on the bed frame  Denies hitting head  Denies LOC   Family member states that patient can not walk now due to pain   Has a scheduled appointment for MRI tomorrow  Patient also concerned for covid as she has family  members at home with covid   Runny  nose and nausea

## 2022-10-07 NOTE — ED PROVIDER NOTES
[address] [facility] ED  eMERGENCY dEPARTMENT eNCOUnter      Pt Name: Ena Yuen  MRN: [id number]  [place] 1960  Date of evaluation: 10/6/2022  Provider: Kesha Cagle [address] is a 57 y.o. female with PMHx of CAD, CHF, ESRD with dialysis Tuesday, Thursday, Saturday with [place], COPD, GERD, seizures, hepatitis C, hypertension, hyperlipidemia, schizophrenia, DM2 presents to the emergency department with fall. Patient states she had a couple drinks while at [facility] and came home and was trying to get into bed and due to chronic knee issues, her knee buckled on her and she hit her back on the bed frame and then fell on the floor. She needed assistance to stand. She is on aspirin. She complains of low back pain. She states she has had weakness for the past couple months but worse today. She is post to get a bone scan tomorrow. She denies head injuries, loss of consciousness, neck pain, chest pain, shortness of breath, abdominal pain, nausea, vomiting, diarrhea, urinary sx. produces minimal urine. HPI    Nursing Notes were reviewed. REVIEW OF SYSTEMS       Review of Systems   Constitutional:  Negative for appetite change, chills and fever. HENT:  Negative for congestion, rhinorrhea and sore throat. Respiratory:  Negative for cough and shortness of breath. Cardiovascular:  Negative for chest pain. Gastrointestinal:  Negative for abdominal pain, blood in stool, diarrhea, nausea and vomiting. Genitourinary:  Negative for difficulty urinating. Musculoskeletal:  Positive for back pain. Negative for neck stiffness. Skin:  Negative for color change and rash. Neurological:  Positive for weakness. Negative for dizziness, syncope, light-headedness, numbness and headaches. All other systems reviewed and are negative.           PAST MEDICAL HISTORY     Past Medical History:   Diagnosis Date    Angina at rest [facility]) 5/24/2020    Anxiety CAD S/P percutaneous coronary angioplasty 2015, 2018    stents per dr Hunter Novak    CHF (congestive heart failure) ([address]     CKD (chronic kidney disease) stage 4, GFR 15-29 ml/min ([address] 2/24/2018    CKD stage 4 due to type 2 diabetes mellitus ([address]     Contusion of right chest wall 2/16/2021    COPD (chronic obstructive pulmonary disease) ([address]     Diabetic nephropathy with proteinuria ([address] 2014    DJD (degenerative joint disease) of knee     Dr Ron Ackerman    GERD (gastroesophageal reflux disease)     Hemiparesis, left ([address] 2013    entered Assisted Living ([facility])    Hemodialysis patient [facility])     Hemodialysis-associated hypotension 10/22/2021    History of heart failure     History of seizures     History of type C viral hepatitis     HTN (hypertension)     Hyperlipidemia     Impaired mobility and activities of daily living     Infection of venous access port 7/29/2022    Mediastinal lymphadenopathy 2013    Dr Yeimy Guzman    Metabolic syndrome     Moderate persistent asthma without complication 9/94/8692    Need for extended care facility 7/7/2021    Neurogenic urinary incontinence 2013    Neuropathy in diabetes ([address]     Nonrheumatic mitral valve regurgitation 7/7/2021    Nonrheumatic tricuspid valve regurgitation 7/7/2021    Obesity (BMI 30-39. 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 Vidya Waller      x1 Dr. Lawanda Helm, Dr Lonny Chapman  08/10/2021    [facility]    DIAGNOSTIC CARDIAC CATH LAB PROCEDURE  10/02/2019    DIALYSIS CATHETER INSERTION Left 2022    Tunneled Symetrex 15.5F x 23cm hemodialysis catheter inserted by Dr. Soraida Alejandra Left 2022    [id number] replamcent tunneld HD cath by Dr. Brianna Sandhu, TOTAL ABDOMINAL (CERVIX REMOVED)      one ovary intact, Dr Viki Spurling, menorrhagia    IR THROMBECTOMY PERCUT AVF  2022    IR THROMBECTOMY PERCUT AVF 2022 [facility] PROCEDURE    IR TUNNELED CATHETER PLACEMENT GREATER THAN 5 YEARS  2022    IR TUNNELED CATHETER PLACEMENT GREATER THAN 5 YEARS 6/3/2022 [facility] PROCEDURE    IR TUNNELED CATHETER PLACEMENT GREATER THAN 5 YEARS Left 2022 Dr. Angélica Wall exchanged to 15.5F x 28 cm.      15.5 fr by 23 cm Symetrex dialysis catheter inserted by Dr Angélica Wall    IR TUNNELED Christi Nicolás 5 YEARS  2022    IR TUNNELED CATHETER PLACEMENT GREATER THAN 5 YEARS 2022 [facility] SPECIAL PROCEDURE    ME TOTAL KNEE ARTHROPLASTY Left 2018    LEFT KNEE TOTAL KNEE ARTHROPLASTY, SHAYNA, NERVE BLOCK performed by Osiris Coronado MD at [address] [place] [address] Right     TOTAL KNEE ARTHROPLASTY  2016    Dr Peralta [place]    TUNNELED [address] Right 2020    tunneled HD catheter per Dr Radha Sandoval       Previous Medications    ACETAMINOPHEN (TYLENOL) 325 MG TABLET    Take 2 tablets by mouth every 4 hours as needed for Pain (For mild to moderate pain (Pain 1-6 out of 10 on pain scale))    ALBUTEROL (PROVENTIL) (2.5 MG/3ML) 0.083% NEBULIZER SOLUTION    Take 3 mLs by nebulization every 4 hours as needed for Wheezing ALCOHOL SWABS (EASY TOUCH ALCOHOL PREP MEDIUM) 70 % PADS    USE AS DIRECTED THREE TIMES A DAY    ARIPIPRAZOLE (ABILIFY) 5 MG TABLET    TAKE 1 TABLET BY MOUTH ONCE DAILY    ASPIRIN EC 81 MG EC TABLET    Take 1 tablet by mouth daily    ATORVASTATIN (LIPITOR) 40 MG TABLET    Take 1 tablet by mouth nightly    B COMPLEX-C-FOLIC ACID (MARK-KANDACE) TABS    Take 1 tablet by mouth in the morning. BLOOD GLUCOSE MONITORING SUPPL (FREESTYLE LITE) CORETTA    1 Device by Does not apply route daily as needed (Diabetes) Use freestyle meter to test blood sugar as needed    BLOOD GLUCOSE MONITORING SUPPL (ONETOUCH VERIO) W/DEVICE KIT    AS DIRECTED    BLOOD GLUCOSE TEST STRIPS (FREESTYLE LITE) STRIP    1 each by Does not apply route 4 times daily (before meals and nightly) As needed. BLOOD GLUCOSE TEST STRIPS (ONETOUCH VERIO) STRIP    QID    FREESTYLE LANCETS MISC    Test 4x daily    FUROSEMIDE (LASIX) 80 MG TABLET        HANDICAP PLACARD MISC    by Does not apply route Expiration in 5 years. INSULIN ASPART (NOVOLOG FLEXPEN) 100 UNIT/ML INJECTION PEN    INJECT 12 UNITS WITH EACH MEAL. INSULIN PEN NEEDLE (SURE COMFORT PEN NEEDLES) 30G X 8 MM MISC    USE AS DIRECTED FIVE TIMES A DAILY    ISOSORBIDE MONONITRATE (IMDUR) 30 MG EXTENDED RELEASE TABLET    TAKE FOUR (4) TABLETS BY MOUTH DAILY    LANTUS SOLOSTAR 100 UNIT/ML INJECTION PEN    INJECT 55 UNITS SUBCUTANEOUSLY AT BEDTIME    LIDOCAINE-PRILOCAINE (EMLA) 2.5-2.5 % CREAM    Apply topically as needed for Pain Apply topically as needed. 3 times a week before dialysis wrap with saran wrap    MAGNESIUM OXIDE (MAG-OX) 400 MG TABLET    TAKE 1 TABLET BY MOUTH DAILY    MELATONIN 10 MG CAPS CAPSULE    Take 1 capsule by mouth nightly    MIDODRINE (PROAMATINE) 10 MG TABLET    TAKE 1 TABLET BY MOUTH ON DAYS OF DIALYSIS TREATMENT THREE TIMES WEEKLY    MIDODRINE (PROAMATINE) 5 MG TABLET    Take 1 tablet by mouth one time a day every Tue, Thu, Sat for hypotension.  Give 30 mins prior to dialysis. NITROGLYCERIN (NITROSTAT) 0.4 MG SL TABLET    up to max of 3 total doses. If no relief after 1 dose, call [phone] [id number] UNIT/GM POWDER    APPLY TO AFFECTED AREA OF ABDOMINAL FOLDS EVERY 12 HOURS AS NEEDED    ONDANSETRON (ZOFRAN) 4 MG TABLET    Take 4 mg by mouth every 8 hours as needed for Nausea or Vomiting    ONETOUCH DELICA LANCETS 65D MISC    QID    SERTRALINE (ZOLOFT) 50 MG TABLET    Take 1 tablet by mouth nightly    TRIMETHOBENZAMIDE (TIGAN) 100 MG/ML INJECTION    Inject 2 mLs into the muscle every 6 hours as needed for Nausea    VITAMIN D (CHOLECALCIFEROL) 50 MCG (2000 UT) TABS TABLET    Take 1 tablet by mouth Daily with supper       ALLERGIES     Codeine and Oxycontin [oxycodone hcl]    FAMILY HISTORY       Family History   Problem Relation Age of Onset    Cancer Mother 76        survived    Breast Cancer Mother     Hypertension Father     Diabetes Sister     Mental Illness Sister     Colon Cancer Neg Hx           SOCIAL HISTORY       Social History     Socioeconomic History    Marital status:      Spouse name: None    Number of children: 2    Years of education: None    Highest education level: None   Occupational History    Occupation: disabled   Tobacco Use    Smoking status: Never    Smokeless tobacco: Never   Vaping Use    Vaping Use: Never used   Substance and Sexual Activity    Alcohol use: No     Alcohol/week: 0.0 standard drinks    Drug use: No    Sexual activity: Not Currently   Social History Narrative    Disabled dt depression and low back pain, lives in [facility] [place] is close by and is her paid caregiver via waiver services    HD via [employer], [facility], [facility] [address], Sat. 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 updates; patient lives with her daughter son-in-law and 2 grandchildren and patient's handicapped son. Per daughter, her brother is blind, MRDD, CP multiple health issues. Daughter's  is patient's legal guardian. Patient has hemodialysis Tuesday Thursday and Saturday. Patient's bedroom is on main floor with a half bath. Daughter walks patient upstairs once weekly for full bath. Patient is using her walker in the home. Patient has a hospital bed in the home. Social Determinants of Health     Financial Resource Strain: Low Risk     Difficulty of Paying Living Expenses: Not hard at all   Food Insecurity: No Food Insecurity    Worried About [employer] in the Last Year: Never true    [address] [employer]  [employer] in the Last Year: Never true   Transportation Needs: No Transportation Needs    Lack of Transportation (Medical): No    Lack of Transportation (Non-Medical): No   Physical Activity: Sufficiently Active    Days of Exercise per Week: 3 days    Minutes of Exercise per Session: 60 min         PHYSICAL EXAM         ED Triage Vitals [10/06/22 2140]   BP Temp Temp src Heart Rate Resp SpO2 Height Weight   103/64 97.3 °F (36.3 °C) -- 87 20 99 % 5' 7\" (1.702 m) 190 lb (86.2 kg)       Physical Exam  Constitutional:       Appearance: She is well-developed. HENT:      Head: Normocephalic and atraumatic. Eyes:      Conjunctiva/sclera: Conjunctivae normal.      Pupils: Pupils are equal, round, and reactive to light. Neck:      Trachea: No tracheal deviation. Cardiovascular:      Heart sounds: Normal heart sounds. Pulmonary:      Effort: Pulmonary effort is normal. No respiratory distress. Breath sounds: Normal breath sounds. No stridor. Abdominal:      General: Bowel sounds are normal. There is no distension. Palpations: Abdomen is soft. There is no mass. Tenderness: There is no abdominal tenderness. There is no guarding or rebound. Musculoskeletal:         General: Tenderness present. Normal range of motion. Cervical back: Normal range of motion and neck supple. Comments: Minimal midline lumbar tenderness to palpation and in right paraspinal muscle, no signs of trauma, no step-offs. No remaining C or T spinous process tenderness   Skin:     General: Skin is warm and dry. Capillary Refill: Capillary refill takes less than 2 seconds. Findings: No rash. Neurological:      Mental Status: She is alert and oriented to person, place, and time. Deep Tendon Reflexes: Reflexes are normal and symmetric. Psychiatric:         Behavior: Behavior normal.         Thought Content: Thought content normal.         Judgment: Judgment normal.       DIAGNOSTIC RESULTS     EKG:All EKG's are interpreted by the Emergency Department Physician who either signs or Co-signs this chart in the absence of a cardiologist.    Sinus rhythm with first-degree AV block with PACs, right bundle branch block, rate 75, no ST segment changes. RADIOLOGY:   Non-plain film images such as CT, Ultrasound and MRI are read by theradiologist. Plain radiographic images are visualized and preliminarily interpreted by the emergency physician with the below findings:    Interpretation per theRadiologist below, if available at the time of this note:    XR CHEST PORTABLE   Final Result   No acute process.          CT LUMBAR SPINE WO CONTRAST    (Results Pending)           LABS:  Labs Reviewed   COMPREHENSIVE METABOLIC PANEL - Abnormal; Notable for the following components:       Result Value    Sodium 133 (*)     Chloride 89 (*)     CO2 34 (*)     Glucose 316 (*)     Creatinine 3.22 (*)     GFR Non- 14.4 (*)     GFR  17.5 (*)     Calcium 10.8 (*)     Total Protein 8.4 (*)     Alkaline Phosphatase 133 (*)     Globulin 4.4 (*)     All other components within normal limits   CBC WITH AUTO DIFFERENTIAL - Abnormal; Notable for the following components:    RBC 3.80 (*)     Hemoglobin 10.9 (*)     Hematocrit 31.1 (*)     MCV 81.7 (*)     RDW 18.1 (*)     Neutrophils Absolute 8.0 (*)     All other components within normal limits   URINALYSIS WITH REFLEX TO CULTURE - Abnormal; Notable for the following components:    Color, UA DARK YELLOW (*)     Clarity, UA TURBID (*)     Glucose, Ur 250 (*)     Ketones, Urine TRACE (*)     Blood, Urine SMALL (*)     Protein,  (*)     Leukocyte Esterase, Urine LARGE (*)     All other components within normal limits   COVID-19, RAPID   ETHANOL   MICROSCOPIC URINALYSIS       All other labs were within normal range or not returned as of this dictation. EMERGENCY DEPARTMENT COURSE and DIFFERENTIAL DIAGNOSIS/MDM:   Vitals:    Vitals:    10/06/22 2140 10/07/22 0021   BP: 103/64 104/66   Pulse: 87 75   Resp: 20 20   Temp: 97.3 °F (36.3 °C)    SpO2: 99% 99%   Weight: 190 lb (86.2 kg)    Height: 5' 7\" (1.702 m)          MDM      Sodium 133, chloride 89, CO2 34, creatinine 3.22, glucose 316. Pt given 500cc IVF, zofran, morphine. CXR shows no acute process. CT lumbar spine shows no acute fractures. Bladder decompressed with wall thickening, suggestion of surrounding inflammation. Patient straight cath per request.  Urine does show moderate leukocytes. Treated with Keflex. Standard anticipatory guidance given to patient upon discharge. Have given them a specific time frame in which to follow-up and who to follow-up with. I have also advised them that they should return to the emergency department if they get worse, or not getting better or develop any new or concerning symptoms. Patient demonstrates understanding. CRITICAL CARE TIME   Total Critical Caretime was 0 minutes, excluding separately reportable procedures. There was a high probability of clinically significant/life threatening deterioration in the patient's condition which required my urgent intervention. Procedures    FINAL IMPRESSION      1. Fall, initial encounter    2. Lumbar contusion, initial encounter    3. Acute cystitis without hematuria          DISPOSITION/PLAN   DISPOSITION Decision To Discharge 10/07/2022 02:29:53 AM      PATIENT REFERRED TO:  Teri Jiménez  [facility] [address] [facility] [id number] [phone]          DISCHARGE MEDICATIONS:  New Prescriptions    CEPHALEXIN (KEFLEX) 500 MG CAPSULE    Take 1 capsule by mouth daily for 7 days          (Please notethat portions of this note were completed with a voice recognition program.  Efforts were made to edit the dictations but occasionally words are mis-transcribed. )    MARCELO Hope (electronically signed)  Emergency Physician Assistant         Eliane Cobianma  10/07/22 [id number] 2

## 2022-10-07 NOTE — ED NOTES
Discharge instructions reviewed with patient. Verbalized understanding. A&O x4. Skin p/w/d. Respirations even and unlabored. No acute distress noted at this time. Patient in wheelchair on discharge. Per patient request patient pushed outside to wait on daughter for ride.        Doris Mcintyre RN  10/07/22 1147

## 2022-10-09 LAB
ORGANISM: ABNORMAL
URINE CULTURE, ROUTINE: ABNORMAL
URINE CULTURE, ROUTINE: ABNORMAL

## 2022-10-17 RX ORDER — NYSTATIN 100000 [USP'U]/G
POWDER TOPICAL
Qty: 60 G | Refills: 10 | OUTPATIENT
Start: 2022-10-17

## 2022-10-18 ENCOUNTER — APPOINTMENT (OUTPATIENT)
Dept: GENERAL RADIOLOGY | Age: 62
End: 2022-10-18
Payer: MEDICARE

## 2022-10-18 ENCOUNTER — HOSPITAL ENCOUNTER (EMERGENCY)
Age: 62
Discharge: HOME | End: 2022-10-18
Payer: MEDICARE

## 2022-10-18 ENCOUNTER — APPOINTMENT (OUTPATIENT)
Dept: CT IMAGING | Age: 62
End: 2022-10-18
Payer: MEDICARE

## 2022-10-18 VITALS
HEIGHT: 67 IN | DIASTOLIC BLOOD PRESSURE: 72 MMHG | RESPIRATION RATE: 14 BRPM | BODY MASS INDEX: 28.88 KG/M2 | SYSTOLIC BLOOD PRESSURE: 135 MMHG | OXYGEN SATURATION: 99 % | WEIGHT: 184 LBS | HEART RATE: 72 BPM | TEMPERATURE: 98.7 F

## 2022-10-18 DIAGNOSIS — R26.81 GAIT INSTABILITY: ICD-10-CM

## 2022-10-18 DIAGNOSIS — M25.562 ACUTE PAIN OF LEFT KNEE: ICD-10-CM

## 2022-10-18 DIAGNOSIS — S09.90XA INJURY OF HEAD, INITIAL ENCOUNTER: Primary | ICD-10-CM

## 2022-10-18 DIAGNOSIS — M25.532 LEFT WRIST PAIN: ICD-10-CM

## 2022-10-18 DIAGNOSIS — S00.03XA HEMATOMA OF SCALP, INITIAL ENCOUNTER: ICD-10-CM

## 2022-10-18 PROCEDURE — 72125 CT NECK SPINE W/O DYE: CPT

## 2022-10-18 PROCEDURE — 73560 X-RAY EXAM OF KNEE 1 OR 2: CPT

## 2022-10-18 PROCEDURE — 99284 EMERGENCY DEPT VISIT MOD MDM: CPT | Performed by: EMERGENCY MEDICINE

## 2022-10-18 PROCEDURE — 6370000000 HC RX 637 (ALT 250 FOR IP): Performed by: PHYSICIAN ASSISTANT

## 2022-10-18 PROCEDURE — 70450 CT HEAD/BRAIN W/O DYE: CPT

## 2022-10-18 PROCEDURE — 73110 X-RAY EXAM OF WRIST: CPT

## 2022-10-18 RX ORDER — ONDANSETRON 4 MG/1
4 TABLET, ORALLY DISINTEGRATING ORAL ONCE
Status: COMPLETED | OUTPATIENT
Start: 2022-10-18 | End: 2022-10-18

## 2022-10-18 RX ORDER — PEN NEEDLE, DIABETIC 30 GX5/16"
NEEDLE, DISPOSABLE MISCELLANEOUS
Qty: 100 EACH | Refills: 10 | Status: SHIPPED | OUTPATIENT
Start: 2022-10-18

## 2022-10-18 RX ADMIN — ONDANSETRON 4 MG: 4 TABLET, ORALLY DISINTEGRATING ORAL at 09:36

## 2022-10-18 ASSESSMENT — ENCOUNTER SYMPTOMS
NAUSEA: 0
VOMITING: 0
COUGH: 0
BACK PAIN: 0
PHOTOPHOBIA: 0
SHORTNESS OF BREATH: 0
ABDOMINAL PAIN: 0
SORE THROAT: 0
DIARRHEA: 0
EYE PAIN: 0
RHINORRHEA: 0

## 2022-10-18 ASSESSMENT — PAIN - FUNCTIONAL ASSESSMENT: PAIN_FUNCTIONAL_ASSESSMENT: 0-10

## 2022-10-18 ASSESSMENT — PAIN DESCRIPTION - LOCATION: LOCATION: HEAD

## 2022-10-18 ASSESSMENT — PAIN SCALES - GENERAL: PAINLEVEL_OUTOF10: 8

## 2022-10-18 NOTE — ED NOTES
Pt lying in bed at this time  Pt is alert and oriented times 4  Report received from [address] [place] [address] Rd, [facility]  10/18/22 4940

## 2022-10-18 NOTE — ED NOTES
Pt states that \"if I can get to dialysis my daughter will pick me up\".    Pt given water and drank well at this time     Emile Méndez RN  10/18/22 1575

## 2022-10-18 NOTE — ED PROVIDER NOTES
[facility] [facility] ED  eMERGENCY dEPARTMENTeNCOUnter      Pt Name: Elliott Christopher  MRN: [id number]  Addygfnidhi 1960  Date ofevaluation: 10/18/2022  Provider: Kirill Danielson Dr       Chief Complaint   Patient presents with    Fall     Pt states her knee gave out and she fell backwards and hit her head. Pt is dialysis pt and will be missing second treatment today. HISTORY OF PRESENT ILLNESS   (Location/Symptom, Timing/Onset,Context/Setting, Quality, Duration, Modifying Factors, Severity)  Note limiting factors. Elliott Christopher is a 57 y.o. female who presents to the emergency department fall. Patient states that she was getting ready to walk out of the house to go to dialysis when her left knee gave out which he does frequently and she has an appointment with Ortho today for this issue and had previous imaging done by them. When the knee gave out she did fall back and strike her head she has a small amount of swelling to the left side no bleeding she is not on any blood thinners denies any neck pain. She does have mild left wrist pain. She did not lose consciousness denies any chest pain shortness of breath. HPI    NursingNotes were reviewed. REVIEW OF SYSTEMS    (2-9 systems for level 4, 10 or more for level 5)     Review of Systems   Constitutional:  Negative for chills, diaphoresis, fatigue and fever. HENT:  Negative for congestion, rhinorrhea and sore throat. Eyes:  Negative for photophobia and pain. Respiratory:  Negative for cough and shortness of breath. Cardiovascular:  Negative for chest pain and palpitations. Gastrointestinal:  Negative for abdominal pain, diarrhea, nausea and vomiting. Genitourinary:  Negative for dysuria and flank pain. Musculoskeletal:  Positive for arthralgias and gait problem. Negative for back pain. Skin:  Negative for rash. Neurological:  Negative for dizziness, light-headedness and headaches. Psychiatric/Behavioral: Negative. All other systems reviewed and are negative. Except as noted above the remainder of the review of systems was reviewed and negative. PAST MEDICAL HISTORY     Past Medical History:   Diagnosis Date    Angina at rest [facility]) 5/24/2020    Anxiety     CAD S/P percutaneous coronary angioplasty 2015, 2018    stents per dr Hao Mckeon    CHF (congestive heart failure) ([address]     CKD (chronic kidney disease) stage 4, GFR 15-29 ml/min ([address] 2/24/2018    CKD stage 4 due to type 2 diabetes mellitus ([address]     Contusion of right chest wall 2/16/2021    COPD (chronic obstructive pulmonary disease) ([address]     Diabetic nephropathy with proteinuria ([address] 2014    DJD (degenerative joint disease) of knee     Dr Mina Kumar    GERD (gastroesophageal reflux disease)     Hemiparesis, left ([address] 2013    entered Assisted Living ([facility])    Hemodialysis patient [facility])     Hemodialysis-associated hypotension 10/22/2021    History of heart failure     History of seizures     History of type C viral hepatitis     HTN (hypertension)     Hyperlipidemia     Impaired mobility and activities of daily living     Infection of venous access port 7/29/2022    Mediastinal lymphadenopathy 2013    Dr Angella Davidson    Metabolic syndrome     Moderate persistent asthma without complication 2/69/4490    Need for extended care facility 7/7/2021    Neurogenic urinary incontinence 2013    Neuropathy in diabetes ([address]     Nonrheumatic mitral valve regurgitation 7/7/2021    Nonrheumatic tricuspid valve regurgitation 7/7/2021    Obesity (BMI 30-39. 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 Lacey Everett      x1 Dr. Jose Gee, Dr Jojo Salguero  08/10/2021    [facility]    DIAGNOSTIC CARDIAC CATH LAB PROCEDURE  10/02/2019    DIALYSIS CATHETER INSERTION Left 2022    Tunneled Symetrex 15.5F x 23cm hemodialysis catheter inserted by Dr. Cisco Berkowitz Left 2022    [id number] replamcent tunneld HD cath by Dr. Anne Davis, TOTAL ABDOMINAL (CERVIX REMOVED)      one ovary intact, Dr Luis Maria, menorrhagia    IR THROMBECTOMY PERCUT AVF  2022    IR THROMBECTOMY PERCUT AVF 2022 [facility] PROCEDURE    IR TUNNELED CATHETER PLACEMENT GREATER THAN 5 YEARS  2022    IR TUNNELED CATHETER PLACEMENT GREATER THAN 5 YEARS 6/3/2022 [facility] PROCEDURE    IR TUNNELED CATHETER PLACEMENT GREATER THAN 5 YEARS Left 2022 Dr. Derick Zapata exchanged to 15.5F x 28 cm.      15.5 fr by 23 cm Symetrex dialysis catheter inserted by Dr Derick Zapata    IR TUNNELED [place] Reas 5 YEARS  2022    IR TUNNELED CATHETER PLACEMENT GREATER THAN 5 YEARS 2022 [facility] PROCEDURE    MD TOTAL KNEE ARTHROPLASTY Left 2018    LEFT KNEE TOTAL KNEE ARTHROPLASTY, SHAYNA, NERVE BLOCK performed by Racheal Carnes MD at [address] [place], [place] [address] Floor Right     TOTAL KNEE ARTHROPLASTY  2016    Dr Mina Kumar    TUNNELED [address] Right 2020    tunneled HD catheter per Dr Saturnino Mccann (TYLENOL) 325 MG TABLET    Take 2 tablets by mouth every 4 hours as needed for Pain (For mild to moderate pain (Pain 1-6 out of 10 on pain scale))    ALBUTEROL (PROVENTIL) (2.5 MG/3ML) 0.083% NEBULIZER SOLUTION    Take 3 mLs by nebulization every 4 hours as needed for Wheezing    ALCOHOL SWABS (EASY TOUCH ALCOHOL PREP MEDIUM) 70 % PADS    USE AS DIRECTED THREE TIMES A DAY    ARIPIPRAZOLE (ABILIFY) 5 MG TABLET    TAKE 1 TABLET BY MOUTH ONCE DAILY    ASPIRIN EC 81 MG EC TABLET    Take 1 tablet by mouth daily    ATORVASTATIN (LIPITOR) 40 MG TABLET    Take 1 tablet by mouth nightly    B COMPLEX-C-FOLIC ACID (MARK-KANDACE) TABS    Take 1 tablet by mouth in the morning. BLOOD GLUCOSE MONITORING SUPPL (FREESTYLE LITE) CORETTA    1 Device by Does not apply route daily as needed (Diabetes) Use freestyle meter to test blood sugar as needed    BLOOD GLUCOSE MONITORING SUPPL (ONETOUCH VERIO) W/DEVICE KIT    AS DIRECTED    BLOOD GLUCOSE TEST STRIPS (FREESTYLE LITE) STRIP    1 each by Does not apply route 4 times daily (before meals and nightly) As needed. BLOOD GLUCOSE TEST STRIPS (ONETOUCH VERIO) STRIP    QID    FREESTYLE LANCETS MISC    Test 4x daily    FUROSEMIDE (LASIX) 80 MG TABLET        HANDICAP PLACARD MISC    by Does not apply route Expiration in 5 years. INSULIN ASPART (NOVOLOG FLEXPEN) 100 UNIT/ML INJECTION PEN    INJECT 12 UNITS WITH EACH MEAL. INSULIN PEN NEEDLE (SURE COMFORT PEN NEEDLES) 30G X 8 MM MISC    USE AS DIRECTED FIVE TIMES A DAILY    ISOSORBIDE MONONITRATE (IMDUR) 30 MG EXTENDED RELEASE TABLET    TAKE FOUR (4) TABLETS BY MOUTH DAILY    LANTUS SOLOSTAR 100 UNIT/ML INJECTION PEN    INJECT 55 UNITS SUBCUTANEOUSLY AT BEDTIME    LIDOCAINE-PRILOCAINE (EMLA) 2.5-2.5 % CREAM    Apply topically as needed for Pain Apply topically as needed.  3 times a week before dialysis wrap with saran wrap    MAGNESIUM OXIDE (MAG-OX) 400 MG TABLET    TAKE 1 TABLET BY MOUTH DAILY    MELATONIN 10 MG CAPS CAPSULE    Take 1 Son is in the home and has CP and is total care-and is also cared for by a waiver by Cirilo Juarez. Pt was born in [place], one of [facility] a twin sister Bambi Hayes, very ill in 2018, [place] [id number]    Moved to [facility], , 2 children, one son (disabled with CP) and one daughter Shola Jackson at [facility], as a nurse's aide Disabled due to mental illness and back pain    Lived at [facility], was discharged, returned to independent living in 2017 in the daughter's house and has adjusted well    One son and one daughter, live in the same house with patient, [employer] pays the rent    [employer] reading ([employer])             10/11/2021 [facility] updates; patient lives with her daughter son-in-law and 2 grandchildren and patient's handicapped son. Per daughter, her brother is blind, MRDD, CP multiple health issues. Daughter's  is patient's legal guardian. Patient has hemodialysis Tuesday Thursday and Saturday. Patient's bedroom is on main floor with a half bath. Daughter walks patient upstairs once weekly for full bath. Patient is using her walker in the home. Patient has a hospital bed in the home. Social Determinants of Health     Financial Resource Strain: Low Risk     Difficulty of Paying Living Expenses: Not hard at all   Food Insecurity: No Food Insecurity    Worried About [facility] in the Last Year: Never true    [facility] in the Last Year: Never true   Transportation Needs: No Transportation Needs    Lack of Transportation (Medical): No    Lack of Transportation (Non-Medical):  No   Physical Activity: Sufficiently Active    Days of Exercise per Week: 3 days    Minutes of Exercise per Session: 60 min       SCREENINGS    Enders Coma Scale  Eye Opening: Spontaneous  Best Verbal Response: Oriented  Best Motor Response: Obeys commands  Michele Coma Scale Score: 15 @FLOW([id number])@      PHYSICAL EXAM    (up to 7 for level 4, 8 or more for level 5)     ED Triage Vitals [10/18/22 0618]   BP Temp Temp Source Heart Rate Resp SpO2 Height Weight   (!) 144/78 98.7 °F (37.1 °C) Oral 71 14 99 % 5' 7\" (1.702 m) 184 lb (83.5 kg)       Physical Exam  Vitals and nursing note reviewed. Constitutional:       General: She is not in acute distress. Appearance: Normal appearance. She is well-developed. She is not diaphoretic. HENT:      Head: Normocephalic and atraumatic. Nose: Nose normal.      Mouth/Throat:      Mouth: Mucous membranes are moist.   Eyes:      General: Lids are normal.      Conjunctiva/sclera: Conjunctivae normal.   Cardiovascular:      Rate and Rhythm: Normal rate and regular rhythm. Pulses: Normal pulses. Heart sounds: Normal heart sounds. Pulmonary:      Effort: Pulmonary effort is normal.      Breath sounds: Normal breath sounds. Abdominal:      General: Bowel sounds are normal.      Palpations: Abdomen is soft. Tenderness: There is no abdominal tenderness. Musculoskeletal:         General: Normal range of motion. Cervical back: Normal range of motion and neck supple. Left knee: Effusion present. Tenderness present. Legs:    Lymphadenopathy:      Cervical: No cervical adenopathy. Skin:     General: Skin is warm and dry. Capillary Refill: Capillary refill takes less than 2 seconds. Findings: No rash. Neurological:      Mental Status: She is alert and oriented to person, place, and time. Psychiatric:         Thought Content:  Thought content normal.         Judgment: Judgment normal.       DIAGNOSTIC RESULTS     EKG: All EKG's are interpreted by the Emergency Department Physician who either signs or Co-signsthis chart in the absence of a cardiologist.        RADIOLOGY:   Non-plain filmimages such as CT, Ultrasound and MRI are read by the radiologist. Plain radiographic images are visualized and preliminarily interpreted by the emergency physician with the below findings:        Interpretation per the Radiologist below, if available at the time ofthis note:    XR WRIST LEFT (MIN 3 VIEWS)   Final Result   No acute fractures or dislocations in the left wrist.         XR KNEE LEFT (1-2 VIEWS)   Final Result   Presence of left knee prosthesis. The prosthesis is in proper position. No acute fractures or dislocations in the left knee. Mild left knee joint effusion. CT HEAD WO CONTRAST   Final Result   No acute intracranial abnormality. Specifically, there is no acute   intracranial hemorrhage. Small left-sided scalp contusion         CT CERVICAL SPINE WO CONTRAST   Final Result   1. There is no acute compression fracture or subluxation of the cervical   spine. 2. Advanced multilevel degenerative disc and degenerative joint disease. ED BEDSIDE ULTRASOUND:   Performed by ED Physician - none    LABS:  Labs Reviewed - No data to display    All other labs were within normal range or not returned as of this dictation. EMERGENCY DEPARTMENT COURSE and DIFFERENTIAL DIAGNOSIS/MDM:   Vitals:    Vitals:    10/18/22 0618 10/18/22 0630 10/18/22 0631 10/18/22 0722   BP: (!) 144/78 (!) 131/53 (!) 131/53 (!) 146/66   Pulse: 71   72   Resp: 14      Temp: 98.7 °F (37.1 °C)      TempSrc: Oral      SpO2: 99%   100%   Weight: 184 lb (83.5 kg)      Height: 5' 7\" (1.702 m)              ROBERTA    Presents to the emergency department after an mechanical fall due to to her left knee giving out which has been a problem for the last 6 weeks and she is seeing orthopedics. She has had imaging done and has a follow-up appointment today at 130. Patient fell when she was on her way to dialysis. She did have a positive head strike with no loss of consciousness she has a small hematoma to left parietal region with no open wounds. She is not on any blood thinners. CT of brain is negative she has a normal neurological exam.  CT C-spine was negative x-ray of left wrist and left knee showed no acute process at this time.   As patient is missing dialysis we are doing her best to get her back over to dialysis. I did discuss this with the patient as well as the patient's daughter who she does live with. At this time due to the left knee weakness and giving way patient was offered to go to a rehab facility due to these frequent falls patient's daughter states that she is capable of taking care of her but ultimately it is up to the patient. Patient declined to go to any type of rehab facility at this time. Everyone is in agreement with the plan of going to dialysis today and then hopefully still going to her orthopedic appointment. Did make effort to call Ortho to try and make them aware that she could be late due to receiving dialysis or if they could push her appointment back or maybe see her tomorrow. Patient's daughter is able to transport her to her Ortho appointment. Advised to the return to the ED for any new falls or injuries. Patient verbalized understanding. Patient stable for discharge. REASSESSMENT          CRITICAL CARE TIME   Total Critical Care time was  minutes, excluding separatelyreportable procedures. There was a high probability ofclinically significant/life threatening deterioration in the patient's condition which required my urgent intervention. CONSULTS:  None    PROCEDURES:  Unless otherwise noted below, none     Procedures    FINAL IMPRESSION      1. Injury of head, initial encounter    2. Hematoma of scalp, initial encounter    3. Gait instability    4. Acute pain of left knee    5. Left wrist pain          DISPOSITION/PLAN   DISPOSITION Decision To Discharge 10/18/2022 08:33:59 AM      PATIENT REFERREDTO:  No follow-up provider specified.     DISCHARGEMEDICATIONS:  New Prescriptions    No medications on file          (Please note that portions of this note were completed with a voice recognition program.  Efforts were made to edit the dictations but occasionally words are mis-transcribed.)    Joel Candelaria Jeong (electronically signed)  Attending Emergency Physician         Mukesh Orourke PA-C  10/18/22 3529

## 2022-10-18 NOTE — ED NOTES
Lianet ROBERTSON at bedside at this time     Adelaida Hylton, [facility]  10/18/22 0506

## 2022-10-18 NOTE — ED TRIAGE NOTES
Pt to ED via EMS due to falling backwards and hitting her head on the steps. Pt is alert and oriented x4, skin is warm, dry, intact. Resp are regular and equal. Pt states that she does feel some dizziness after falling. Pt states this will be her second missed dialysis appointment today. Pt denies LOC and thinners.

## 2022-11-12 ENCOUNTER — APPOINTMENT (OUTPATIENT)
Dept: GENERAL RADIOLOGY | Age: 62
End: 2022-11-12
Payer: MEDICARE

## 2022-11-12 ENCOUNTER — HOSPITAL ENCOUNTER (OUTPATIENT)
Age: 62
Setting detail: OBSERVATION
Discharge: HOME | End: 2022-11-13
Attending: EMERGENCY MEDICINE | Admitting: INTERNAL MEDICINE
Payer: MEDICARE

## 2022-11-12 DIAGNOSIS — R07.9 CHEST PAIN, UNSPECIFIED TYPE: Primary | ICD-10-CM

## 2022-11-12 DIAGNOSIS — R73.9 ACUTE HYPERGLYCEMIA: ICD-10-CM

## 2022-11-12 LAB
ALBUMIN SERPL-MCNC: 3.6 G/DL (ref 3.5–4.6)
ALP BLD-CCNC: 129 U/L (ref 40–130)
ALT SERPL-CCNC: 9 U/L (ref 0–33)
ANION GAP SERPL CALCULATED.3IONS-SCNC: 12 MEQ/L (ref 9–15)
APTT: 33 SEC (ref 24.4–36.8)
AST SERPL-CCNC: 14 U/L (ref 0–35)
BASE EXCESS ARTERIAL: 4 (ref -3–3)
BILIRUB SERPL-MCNC: 0.7 MG/DL (ref 0.2–0.7)
BUN BLDV-MCNC: 43 MG/DL (ref 8–23)
CALCIUM IONIZED: 1.28 MMOL/L (ref 1.12–1.32)
CALCIUM SERPL-MCNC: 10.3 MG/DL (ref 8.5–9.9)
CHLORIDE BLD-SCNC: 92 MEQ/L (ref 95–107)
CO2: 29 MEQ/L (ref 20–31)
CREAT SERPL-MCNC: 4.37 MG/DL (ref 0.5–0.9)
GFR SERPL CREATININE-BSD FRML MDRD: 10.7 ML/MIN/{1.73_M2}
GFR SERPL CREATININE-BSD FRML MDRD: 12 ML/MIN/{1.73_M2}
GLOBULIN: 4.1 G/DL (ref 2.3–3.5)
GLUCOSE BLD-MCNC: 518 MG/DL (ref 70–99)
GLUCOSE BLD-MCNC: 562 MG/DL (ref 70–99)
GLUCOSE BLD-MCNC: 571 MG/DL (ref 70–99)
GLUCOSE BLD-MCNC: 662 MG/DL (ref 70–99)
HCO3 ARTERIAL: 27.7 MMOL/L (ref 21–29)
HCT VFR BLD CALC: 30.7 % (ref 37–47)
HEMOGLOBIN: 10.3 G/DL (ref 12–16)
HEMOGLOBIN: 11.9 GM/DL (ref 12–16)
INR BLD: 1.2
LACTATE: 2.17 MMOL/L (ref 0.4–2)
LACTIC ACID: 2.8 MMOL/L (ref 0.5–2.2)
MCH RBC QN AUTO: 28.2 PG (ref 27–31.3)
MCHC RBC AUTO-ENTMCNC: 33.5 % (ref 33–37)
MCV RBC AUTO: 84 FL (ref 79.4–94.8)
O2 SAT, ARTERIAL: 96 % (ref 93–100)
PCO2 ARTERIAL: 41 MM HG (ref 35–45)
PDW BLD-RTO: 17.9 % (ref 11.5–14.5)
PERFORMED ON: ABNORMAL
PH ARTERIAL: 7.44 (ref 7.35–7.45)
PLATELET # BLD: 157 K/UL (ref 130–400)
PO2 ARTERIAL: 80 MM HG (ref 75–108)
POC CHLORIDE: 94 MEQ/L (ref 99–110)
POC CREATININE: 4.1 MG/DL (ref 0.6–1.2)
POC FIO2: 21
POC HEMATOCRIT: 35 % (ref 36–48)
POC POTASSIUM: 4.7 MEQ/L (ref 3.5–5.1)
POC SAMPLE TYPE: ABNORMAL
POC SODIUM: 132 MEQ/L (ref 136–145)
POTASSIUM SERPL-SCNC: 4.7 MEQ/L (ref 3.4–4.9)
PROTHROMBIN TIME: 15 SEC (ref 12.3–14.9)
RBC # BLD: 3.66 M/UL (ref 4.2–5.4)
SODIUM BLD-SCNC: 133 MEQ/L (ref 135–144)
TCO2 ARTERIAL: 29 MMOL/L (ref 21–32)
TOTAL PROTEIN: 7.7 G/DL (ref 6.3–8)
TROPONIN: 0.03 NG/ML (ref 0–0.01)
WBC # BLD: 10.7 K/UL (ref 4.8–10.8)

## 2022-11-12 PROCEDURE — 96374 THER/PROPH/DIAG INJ IV PUSH: CPT

## 2022-11-12 PROCEDURE — 82330 ASSAY OF CALCIUM: CPT

## 2022-11-12 PROCEDURE — 85730 THROMBOPLASTIN TIME PARTIAL: CPT

## 2022-11-12 PROCEDURE — 36600 WITHDRAWAL OF ARTERIAL BLOOD: CPT

## 2022-11-12 PROCEDURE — 6360000002 HC RX W HCPCS: Performed by: EMERGENCY MEDICINE

## 2022-11-12 PROCEDURE — 2580000003 HC RX 258: Performed by: INTERNAL MEDICINE

## 2022-11-12 PROCEDURE — 84295 ASSAY OF SERUM SODIUM: CPT

## 2022-11-12 PROCEDURE — 99285 EMERGENCY DEPT VISIT HI MDM: CPT

## 2022-11-12 PROCEDURE — 93005 ELECTROCARDIOGRAM TRACING: CPT | Performed by: EMERGENCY MEDICINE

## 2022-11-12 PROCEDURE — 82948 REAGENT STRIP/BLOOD GLUCOSE: CPT

## 2022-11-12 PROCEDURE — 84484 ASSAY OF TROPONIN QUANT: CPT

## 2022-11-12 PROCEDURE — 87040 BLOOD CULTURE FOR BACTERIA: CPT

## 2022-11-12 PROCEDURE — 83605 ASSAY OF LACTIC ACID: CPT

## 2022-11-12 PROCEDURE — 80053 COMPREHEN METABOLIC PANEL: CPT

## 2022-11-12 PROCEDURE — 96375 TX/PRO/DX INJ NEW DRUG ADDON: CPT

## 2022-11-12 PROCEDURE — 84132 ASSAY OF SERUM POTASSIUM: CPT

## 2022-11-12 PROCEDURE — 82565 ASSAY OF CREATININE: CPT

## 2022-11-12 PROCEDURE — 6370000000 HC RX 637 (ALT 250 FOR IP): Performed by: EMERGENCY MEDICINE

## 2022-11-12 PROCEDURE — 85610 PROTHROMBIN TIME: CPT

## 2022-11-12 PROCEDURE — G0378 HOSPITAL OBSERVATION PER HR: HCPCS

## 2022-11-12 PROCEDURE — 96372 THER/PROPH/DIAG INJ SC/IM: CPT

## 2022-11-12 PROCEDURE — 6360000002 HC RX W HCPCS: Performed by: INTERNAL MEDICINE

## 2022-11-12 PROCEDURE — 71045 X-RAY EXAM CHEST 1 VIEW: CPT

## 2022-11-12 PROCEDURE — 36415 COLL VENOUS BLD VENIPUNCTURE: CPT

## 2022-11-12 PROCEDURE — 6370000000 HC RX 637 (ALT 250 FOR IP): Performed by: INTERNAL MEDICINE

## 2022-11-12 PROCEDURE — 85014 HEMATOCRIT: CPT

## 2022-11-12 PROCEDURE — 82435 ASSAY OF BLOOD CHLORIDE: CPT

## 2022-11-12 PROCEDURE — 85027 COMPLETE CBC AUTOMATED: CPT

## 2022-11-12 PROCEDURE — 82803 BLOOD GASES ANY COMBINATION: CPT

## 2022-11-12 RX ORDER — NITROGLYCERIN 0.4 MG/1
0.4 TABLET SUBLINGUAL ONCE
Status: COMPLETED | OUTPATIENT
Start: 2022-11-12 | End: 2022-11-12

## 2022-11-12 RX ORDER — SODIUM CHLORIDE 0.9 % (FLUSH) 0.9 %
5-40 SYRINGE (ML) INJECTION EVERY 12 HOURS SCHEDULED
Status: DISCONTINUED | OUTPATIENT
Start: 2022-11-12 | End: 2022-11-13 | Stop reason: HOSPADM

## 2022-11-12 RX ORDER — ACETAMINOPHEN 650 MG/1
650 SUPPOSITORY RECTAL EVERY 6 HOURS PRN
Status: DISCONTINUED | OUTPATIENT
Start: 2022-11-12 | End: 2022-11-13 | Stop reason: HOSPADM

## 2022-11-12 RX ORDER — ONDANSETRON 4 MG/1
4 TABLET, ORALLY DISINTEGRATING ORAL EVERY 8 HOURS PRN
Status: DISCONTINUED | OUTPATIENT
Start: 2022-11-12 | End: 2022-11-13 | Stop reason: HOSPADM

## 2022-11-12 RX ORDER — SODIUM CHLORIDE 9 MG/ML
INJECTION, SOLUTION INTRAVENOUS PRN
Status: DISCONTINUED | OUTPATIENT
Start: 2022-11-12 | End: 2022-11-13 | Stop reason: HOSPADM

## 2022-11-12 RX ORDER — ACETAMINOPHEN 325 MG/1
650 TABLET ORAL EVERY 6 HOURS PRN
Status: DISCONTINUED | OUTPATIENT
Start: 2022-11-12 | End: 2022-11-13 | Stop reason: HOSPADM

## 2022-11-12 RX ORDER — ONDANSETRON 2 MG/ML
4 INJECTION INTRAMUSCULAR; INTRAVENOUS EVERY 6 HOURS PRN
Status: DISCONTINUED | OUTPATIENT
Start: 2022-11-12 | End: 2022-11-13 | Stop reason: HOSPADM

## 2022-11-12 RX ORDER — ATORVASTATIN CALCIUM 40 MG/1
40 TABLET, FILM COATED ORAL NIGHTLY
Status: DISCONTINUED | OUTPATIENT
Start: 2022-11-12 | End: 2022-11-13 | Stop reason: HOSPADM

## 2022-11-12 RX ORDER — SODIUM CHLORIDE 0.9 % (FLUSH) 0.9 %
5-40 SYRINGE (ML) INJECTION PRN
Status: DISCONTINUED | OUTPATIENT
Start: 2022-11-12 | End: 2022-11-13 | Stop reason: HOSPADM

## 2022-11-12 RX ORDER — ENOXAPARIN SODIUM 100 MG/ML
30 INJECTION SUBCUTANEOUS DAILY
Status: DISCONTINUED | OUTPATIENT
Start: 2022-11-12 | End: 2022-11-13 | Stop reason: HOSPADM

## 2022-11-12 RX ORDER — ASPIRIN 81 MG/1
81 TABLET, CHEWABLE ORAL DAILY
Status: DISCONTINUED | OUTPATIENT
Start: 2022-11-12 | End: 2022-11-13 | Stop reason: HOSPADM

## 2022-11-12 RX ORDER — INSULIN GLARGINE 100 [IU]/ML
20 INJECTION, SOLUTION SUBCUTANEOUS NIGHTLY
Status: DISCONTINUED | OUTPATIENT
Start: 2022-11-12 | End: 2022-11-13 | Stop reason: HOSPADM

## 2022-11-12 RX ORDER — NITROGLYCERIN 0.4 MG/1
0.4 TABLET SUBLINGUAL EVERY 5 MIN PRN
Status: DISCONTINUED | OUTPATIENT
Start: 2022-11-12 | End: 2022-11-13 | Stop reason: HOSPADM

## 2022-11-12 RX ORDER — ASPIRIN 81 MG/1
81 TABLET, CHEWABLE ORAL ONCE
Status: COMPLETED | OUTPATIENT
Start: 2022-11-12 | End: 2022-11-12

## 2022-11-12 RX ORDER — INSULIN LISPRO 100 [IU]/ML
0-4 INJECTION, SOLUTION INTRAVENOUS; SUBCUTANEOUS NIGHTLY
Status: DISCONTINUED | OUTPATIENT
Start: 2022-11-12 | End: 2022-11-13 | Stop reason: HOSPADM

## 2022-11-12 RX ORDER — HYDROXYZINE HYDROCHLORIDE 25 MG/1
TABLET, FILM COATED ORAL
COMMUNITY
Start: 2022-10-19

## 2022-11-12 RX ORDER — MORPHINE SULFATE 4 MG/ML
4 INJECTION, SOLUTION INTRAMUSCULAR; INTRAVENOUS
Status: COMPLETED | OUTPATIENT
Start: 2022-11-12 | End: 2022-11-12

## 2022-11-12 RX ORDER — POLYETHYLENE GLYCOL 3350 17 G/17G
17 POWDER, FOR SOLUTION ORAL DAILY PRN
Status: DISCONTINUED | OUTPATIENT
Start: 2022-11-12 | End: 2022-11-13 | Stop reason: HOSPADM

## 2022-11-12 RX ORDER — ONDANSETRON 2 MG/ML
4 INJECTION INTRAMUSCULAR; INTRAVENOUS ONCE
Status: COMPLETED | OUTPATIENT
Start: 2022-11-12 | End: 2022-11-12

## 2022-11-12 RX ORDER — INSULIN LISPRO 100 [IU]/ML
0-8 INJECTION, SOLUTION INTRAVENOUS; SUBCUTANEOUS
Status: DISCONTINUED | OUTPATIENT
Start: 2022-11-12 | End: 2022-11-13 | Stop reason: HOSPADM

## 2022-11-12 RX ADMIN — ASPIRIN 81 MG: 81 TABLET, CHEWABLE ORAL at 15:49

## 2022-11-12 RX ADMIN — Medication 10 ML: at 21:05

## 2022-11-12 RX ADMIN — ENOXAPARIN SODIUM 30 MG: 100 INJECTION SUBCUTANEOUS at 21:04

## 2022-11-12 RX ADMIN — ATORVASTATIN CALCIUM 40 MG: 40 TABLET, FILM COATED ORAL at 21:03

## 2022-11-12 RX ADMIN — NITROGLYCERIN 0.5 INCH: 20 OINTMENT TOPICAL at 15:49

## 2022-11-12 RX ADMIN — NITROGLYCERIN 0.4 MG: 0.4 TABLET SUBLINGUAL at 14:00

## 2022-11-12 RX ADMIN — ONDANSETRON 4 MG: 2 INJECTION INTRAMUSCULAR; INTRAVENOUS at 17:50

## 2022-11-12 RX ADMIN — METOPROLOL TARTRATE 25 MG: 25 TABLET, FILM COATED ORAL at 21:03

## 2022-11-12 RX ADMIN — INSULIN HUMAN 10 UNITS: 100 INJECTION, SOLUTION PARENTERAL at 15:56

## 2022-11-12 RX ADMIN — MORPHINE SULFATE 4 MG: 4 INJECTION, SOLUTION INTRAMUSCULAR; INTRAVENOUS at 17:50

## 2022-11-12 RX ADMIN — INSULIN LISPRO 4 UNITS: 100 INJECTION, SOLUTION INTRAVENOUS; SUBCUTANEOUS at 22:29

## 2022-11-12 RX ADMIN — INSULIN GLARGINE 20 UNITS: 100 INJECTION, SOLUTION SUBCUTANEOUS at 22:29

## 2022-11-12 ASSESSMENT — ENCOUNTER SYMPTOMS
EYES NEGATIVE: 1
CHEST TIGHTNESS: 1
VOMITING: 0
NAUSEA: 0
RHINORRHEA: 0
TROUBLE SWALLOWING: 0
ALLERGIC/IMMUNOLOGIC NEGATIVE: 1
ABDOMINAL PAIN: 0
WHEEZING: 0
SHORTNESS OF BREATH: 0

## 2022-11-12 ASSESSMENT — PAIN SCALES - GENERAL
PAINLEVEL_OUTOF10: 6
PAINLEVEL_OUTOF10: 0
PAINLEVEL_OUTOF10: 0

## 2022-11-12 ASSESSMENT — LIFESTYLE VARIABLES
HOW MANY STANDARD DRINKS CONTAINING ALCOHOL DO YOU HAVE ON A TYPICAL DAY: PATIENT DOES NOT DRINK
HOW OFTEN DO YOU HAVE A DRINK CONTAINING ALCOHOL: NEVER

## 2022-11-12 NOTE — CARE COORDINATION
11/12/22    From:HOME DTR IS PAID CAREGIVER THRU [facility], RELEASED FROM [facility] 9/22 IS W/C BOUND, HD T-TH-SAT ([facility] WAS LAST DIALYSIS)     Admit: OBS CHEST PAIN     PMH:CAD, CABG, STENTS, CHF, HD, CVA (L HEMIPARESIS) ANGINA, SEIZURES    Anticipated Discharge [address] VS SNF    Patient Mobility or PT/OT ordered: WILL NEED ORDER    Consults: NEPHROLOGY, CARDIOLOGY    Clinical: P02 80%----562----43/ 4.37---TROP 0.033--0.027--NITRO HELPED     Barriers:   CONSULTS TO SEE  BLOOD CX   NEEDS DIALYSIS      Assessments: CMI DONE

## 2022-11-12 NOTE — ED PROVIDER NOTES
[address] [facility] ED  eMERGENCY dEPARTMENT eNCOUnter      Pt Name: Monica Clements  MRN: [id number]  [place] 1960  Date of evaluation: 11/12/2022  Provider: Adebayo Hughes MD    CHIEF COMPLAINT       Chief Complaint   Patient presents with    Chest Pain         HISTORY OF PRESENT ILLNESS   (Location/Symptom, Timing/Onset,Context/Setting, Quality, Duration, Modifying Factors, Severity)  Note limiting factors. Monica Clements is a 57 y.o. female who presents to the emergency department with a significant complaint of chest pain starting this morning. Patient admits this feels just like previous unstable angina prior to her open heart surgery. Patient admits that today began with heaviness prior to dialysis. Patient gave nitroglycerin with improvement symptomatologies. Patient then at dialysis began with chest pain prior to initiating dialysis. Dialysis was held and patient was sent to the emergency department for evaluation. Patient admits ongoing chest heaviness. Patient admits routine insulin at home. Patient admits normal maintain sugars. Patient denies associated diaphoresis, nausea, or vomiting. HPI    NursingNotes were reviewed. REVIEW OF SYSTEMS    (2-9 systems for level 4, 10 or more for level 5)     Review of Systems   Constitutional:  Negative for activity change, chills and fever. HENT:  Negative for congestion, ear pain, rhinorrhea and trouble swallowing. Eyes: Negative. Respiratory:  Positive for chest tightness. Negative for shortness of breath and wheezing. Cardiovascular:  Positive for chest pain. Negative for leg swelling. Gastrointestinal:  Negative for abdominal pain, nausea and vomiting. Endocrine: Negative. Genitourinary:  Negative for dysuria, frequency and hematuria. Musculoskeletal:  Negative for gait problem and neck pain. Skin: Negative. Allergic/Immunologic: Negative.     Neurological:  Negative for seizures, syncope and light-headedness. Hematological: Negative. Psychiatric/Behavioral: Negative. Except as noted above the remainder of the review of systems was reviewed and negative. PAST MEDICAL HISTORY     Past Medical History:   Diagnosis Date    Angina at rest [facility]) 5/24/2020    Anxiety     CAD S/P percutaneous coronary angioplasty 2015, 2018    stents per dr Amie Omalley    CHF (congestive heart failure) ([facility]     CKD (chronic kidney disease) stage 4, GFR 15-29 ml/min ([facility] 2/24/2018    CKD stage 4 due to type 2 diabetes mellitus ([address]     Contusion of right chest wall 2/16/2021    COPD (chronic obstructive pulmonary disease) ([facility]     Diabetic nephropathy with proteinuria ([facility] 2014    DJD (degenerative joint disease) of knee     Dr Jaison Diehl    GERD (gastroesophageal reflux disease)     Hemiparesis, left ([facility] 2013    entered Assisted Living ([facility])    Hemodialysis patient [facility])     Hemodialysis-associated hypotension 10/22/2021    History of heart failure     History of seizures     History of type C viral hepatitis     HTN (hypertension)     Hyperlipidemia     Impaired mobility and activities of daily living     Infection of venous access port 7/29/2022    Mediastinal lymphadenopathy 2013    Dr Andres Hodgkin    Metabolic syndrome     Moderate persistent asthma without complication 6/61/5580    Need for extended care facility 7/7/2021    Neurogenic urinary incontinence 2013    Neuropathy in diabetes ([address]     Nonrheumatic mitral valve regurgitation 7/7/2021    Nonrheumatic tricuspid valve regurgitation 7/7/2021    Obesity (BMI 30-39. 9)     Recurrent UTI     S/P colonoscopy 2014    [facility], focal active colitis    Schizophrenia, paranoid, chronic ([address]     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 Julian Harrington      x1 Dr. Omari Ross, Dr Haritha Damon  08/10/2021    [facility]    DIAGNOSTIC CARDIAC CATH LAB PROCEDURE  10/02/2019    DIALYSIS CATHETER INSERTION Left 2022    Tunneled Symetrex 15.5F x 23cm hemodialysis catheter inserted by Dr. Shun Montoya Left 2022    15.4Ix73ni replamcent tunneld HD cath by Dr. Cuca Welch, TOTAL ABDOMINAL (CERVIX REMOVED)      one ovary intact, Dr Danny Slade, menorrhagia    IR THROMBECTOMY PERCUT AVF  2022    IR THROMBECTOMY PERCUT AVF 2022 [facility] SPECIAL PROCEDURE    IR TUNNELED CATHETER PLACEMENT GREATER THAN 5 YEARS  2022    IR TUNNELED CATHETER PLACEMENT GREATER THAN 5 YEARS 6/3/2022 [facility] PROCEDURE    IR TUNNELED CATHETER PLACEMENT GREATER THAN 5 YEARS Left 2022 Dr. Arsenio Brower exchanged to 15.5F x 28 cm.      15.5 fr by 23 cm Symetrex dialysis catheter inserted by Dr Arsenio Brower    IR TUNNELED [address] 5 YEARS  2022    IR TUNNELED CATHETER PLACEMENT GREATER THAN 5 YEARS 2022 [facility] SPECIAL PROCEDURE    DE TOTAL KNEE ARTHROPLASTY Left 2018    LEFT KNEE TOTAL KNEE ARTHROPLASTY, SHAYNA, NERVE BLOCK performed by Lilliam Abdul MD at [address] [place], [place] [address] Floor Right     TOTAL KNEE ARTHROPLASTY  2016    Dr Maria Esther Kirby    TUNNELED [address] Right 2020    tunneled HD catheter per Dr Martinez Chimera (TYLENOL) 325 MG TABLET    Take 2 tablets by mouth every 4 hours as needed for Pain (For mild to moderate pain (Pain 1-6 out of 10 on pain scale))    ALBUTEROL (PROVENTIL) (2.5 MG/3ML) 0.083% NEBULIZER SOLUTION    Take 3 mLs by nebulization every 4 hours as needed for Wheezing    ALCOHOL SWABS (EASY TOUCH ALCOHOL PREP MEDIUM) 70 % PADS    USE AS DIRECTED THREE TIMES A DAY    ARIPIPRAZOLE (ABILIFY) 5 MG TABLET    TAKE 1 TABLET BY MOUTH ONCE DAILY    ASPIRIN EC 81 MG EC TABLET    Take 1 tablet by mouth daily    ATORVASTATIN (LIPITOR) 40 MG TABLET    Take 1 tablet by mouth nightly    B COMPLEX-C-FOLIC ACID (MARK-KANDACE) TABS    Take 1 tablet by mouth in the morning. BLOOD GLUCOSE MONITORING SUPPL (FREESTYLE LITE) CORETTA    1 Device by Does not apply route daily as needed (Diabetes) Use freestyle meter to test blood sugar as needed    BLOOD GLUCOSE MONITORING SUPPL (ONETOUCH VERIO) W/DEVICE KIT    AS DIRECTED    BLOOD GLUCOSE TEST STRIPS (FREESTYLE LITE) STRIP    1 each by Does not apply route 4 times daily (before meals and nightly) As needed. BLOOD GLUCOSE TEST STRIPS (ONETOUCH VERIO) STRIP    QID    FREESTYLE LANCETS MISC    Test 4x daily    FUROSEMIDE (LASIX) 80 MG TABLET        HANDICAP PLACARD MISC    by Does not apply route Expiration in 5 years. INSULIN ASPART (NOVOLOG FLEXPEN) 100 UNIT/ML INJECTION PEN    INJECT 12 UNITS WITH EACH MEAL. ISOSORBIDE MONONITRATE (IMDUR) 30 MG EXTENDED RELEASE TABLET    TAKE FOUR (4) TABLETS BY MOUTH DAILY    LANTUS SOLOSTAR 100 UNIT/ML INJECTION PEN    INJECT 55 UNITS SUBCUTANEOUSLY AT BEDTIME    LIDOCAINE-PRILOCAINE (EMLA) 2.5-2.5 % CREAM    Apply topically as needed for Pain Apply topically as needed.  3 times a week before dialysis wrap with saran wrap    MAGNESIUM OXIDE (MAG-OX) 400 MG TABLET    TAKE 1 TABLET BY MOUTH DAILY    MELATONIN 10 MG CAPS CAPSULE    Take 1 capsule by mouth nightly    MIDODRINE (PROAMATINE) 10 MG TABLET    TAKE 1 TABLET BY MOUTH ON DAYS OF DIALYSIS TREATMENT THREE TIMES WEEKLY    MIDODRINE (PROAMATINE) 5 MG TABLET    Take 1 tablet by mouth one time a day every Tue, Thu, Sat for hypotension. Give 30 mins prior to dialysis. NITROGLYCERIN (NITROSTAT) 0.4 MG SL TABLET    up to max of 3 total doses. If no relief after 1 dose, call [phone] [id number] UNIT/GM POWDER    APPLY TO AFFECTED AREA OF ABDOMINAL FOLDS EVERY 12 HOURS AS NEEDED    ONDANSETRON (ZOFRAN) 4 MG TABLET    Take 4 mg by mouth every 8 hours as needed for Nausea or Vomiting    ONETOUCH DELICA LANCETS 36L MISC    QID    SERTRALINE (ZOLOFT) 50 MG TABLET    Take 1 tablet by mouth nightly    SURE COMFORT PEN NEEDLES 30G X 8 MM MISC    USE AS DIRECTED FIVE TIMES A DAILY    TRIMETHOBENZAMIDE (TIGAN) 100 MG/ML INJECTION    Inject 2 mLs into the muscle every 6 hours as needed for Nausea    VITAMIN D (CHOLECALCIFEROL) 50 MCG (2000 UT) TABS TABLET    Take 1 tablet by mouth Daily with supper       ALLERGIES     Codeine and Oxycontin [oxycodone hcl]    FAMILY HISTORY       Family History   Problem Relation Age of Onset    Cancer Mother 76        survived    Breast Cancer Mother     Hypertension Father     Diabetes Sister     Mental Illness Sister     Colon Cancer Neg Hx           SOCIAL HISTORY       Social History     Socioeconomic History    Marital status:      Spouse name: None    Number of children: 2    Years of education: None    Highest education level: None   Occupational History    Occupation: disabled   Tobacco Use    Smoking status: Never    Smokeless tobacco: Never   Vaping Use    Vaping Use: Never used   Substance and Sexual Activity    Alcohol use: No     Alcohol/week: 0.0 standard drinks    Drug use: No    Sexual activity: Not Currently   Social History Narrative    Disabled dt depression and low back pain, lives in [facility] Veronica Bailey is close by and is her paid caregiver via waiver services    HD via [employer], [id number], [employer], Sat.     Son is in the home and has CP and is total care-and is also cared for by a waiver by Kle[employer] Reyes. 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 Per daughter, her brother is blind, MRDD, CP multiple health issues. Daughter's  is patient's legal guardian. Patient has hemodialysis Tuesday Thursday and Saturday. Patient's bedroom is on main floor with a half bath. Daughter walks patient upstairs once weekly for full bath. Patient is using her walker in the home. Patient has a hospital bed in the home. Social Determinants of Health     Financial Resource Strain: Low Risk     Difficulty of Paying Living Expenses: Not hard at all   Food Insecurity: No Food Insecurity    Worried About [facility] in the Last Year: Never true    [facility] in the Last Year: Never true   Transportation Needs: No Transportation Needs    Lack of Transportation (Medical): No    Lack of Transportation (Non-Medical):  No   Physical Activity: Sufficiently Active    Days of Exercise per Week: 3 days    Minutes of Exercise per Session: 60 min       SCREENINGS    [place] Coma Scale  Eye Opening: Spontaneous  Best Verbal Response: Oriented  Best Motor Response: Obeys commands  Michele Coma Scale Score: 15        PHYSICAL EXAM    (up to 7 for level 4, 8 or more for level 5)     ED Triage Vitals [11/12/22 1330]   BP Temp Temp Source Heart Rate Resp SpO2 Height Weight   (!) 152/75 98.2 °F (36.8 °C) Temporal 90 18 96 % 5' 7\" (1.702 m) 185 lb (83.9 kg)       Physical Exam  Constitutional:       General: She is not in acute distress. Appearance: Normal appearance. She is well-developed and normal weight. HENT:      Head: Normocephalic and atraumatic. Right Ear: External ear normal.      Left Ear: External ear normal.      Nose: Nose normal.      Mouth/Throat:      Pharynx: Oropharynx is clear. Eyes:      General:         Right eye: No discharge. Left eye: No discharge. Conjunctiva/sclera: Conjunctivae normal.      Pupils: Pupils are equal, round, and reactive to light. Neck:      Trachea: Trachea normal.   Cardiovascular:      Rate and Rhythm: Normal rate and regular rhythm. Pulses: Normal pulses. Heart sounds: Normal heart sounds. No gallop. Pulmonary:      Effort: Pulmonary effort is normal. No respiratory distress. Breath sounds: Normal breath sounds. No stridor. Abdominal:      General: Bowel sounds are normal. There is no distension. Palpations: Abdomen is soft. There is no mass. Musculoskeletal:         General: No swelling or tenderness. Normal range of motion. Cervical back: Full passive range of motion without pain, normal range of motion and neck supple. No rigidity. Skin:     General: Skin is warm and dry. Capillary Refill: Capillary refill takes less than 2 seconds. Coloration: Skin is not jaundiced or pale. Neurological:      Mental Status: She is alert and oriented to person, place, and time. Cranial Nerves: No cranial nerve deficit. Sensory: No sensory deficit. Psychiatric:         Speech: Speech normal.         Behavior: Behavior normal.         Thought Content:  Thought content normal.         Judgment: Judgment normal.       DIAGNOSTIC RESULTS     EKG: All EKG's are interpreted by the Emergency Department Physician who either signs or Co-signsthis chart in the absence of a cardiologist.    EG demonstrates right bundle branch block. Demonstrates sinus rhythm. There is first-degree AV block. There is PACs. There is no evidence of acute ST segment elevation myocardial infarction. RADIOLOGY:   Non-plain filmimages such as CT, Ultrasound and MRI are read by the radiologist. Plain radiographic images are visualized and preliminarily interpreted by the emergency physician with the below findings:    2 view chest x-ray demonstrates cardiomegaly. Dialysis catheter is noted. Median sternotomy wires are appreciated. There is no evidence of acute vascular congestion. Bony structure within normal limits. This is an abnormal however nonacute appearing single view chest x-ray. Interpretation per the Radiologist below, if available at the time ofthis note:    XR CHEST PORTABLE   Final Result   Cardiomegaly. There are no findings of failure or pneumonia. Stable position of the left dialysis catheter.                ED BEDSIDE ULTRASOUND:   Performed by ED Physician - none    LABS:  Labs Reviewed   CBC - Abnormal; Notable for the following components:       Result Value    RBC 3.66 (*)     Hemoglobin 10.3 (*)     Hematocrit 30.7 (*)     RDW 17.9 (*)     All other components within normal limits   COMPREHENSIVE METABOLIC PANEL - Abnormal; Notable for the following components:    Sodium 133 (*)     Chloride 92 (*)     Glucose 662 (*)     BUN 43 (*)     Creatinine 4.37 (*)     Est, Glom Filt Rate 10.7 (*)     Calcium 10.3 (*)     Globulin 4.1 (*)     All other components within normal limits    Narrative:     CALL  [facility]  [facility] tel. [phone],  glu results called to and read back by alma joel, 11/12/2022 15:05, by Jn Schilling  trop results called to and read back by arnel, 11/12/2022 14:49, by BEVERLY   TROPONIN - Abnormal; Notable for the following components:    Troponin 0.033 (*)     All other components within normal limits    Narrative:     CALL Kareem Obrien tel. [phone],  trop results called to and read back by Melissa Davis, 11/12/2022 14:49, by BEVERLY   TROPONIN - Abnormal; Notable for the following components:    Troponin 0.027 (*)     All other components within normal limits    Narrative:     CALL  [facility]  [facility] tel. [phone],  trop results called to and read back by magalis, 11/12/2022 17:00, by BEVERLY   PROTIME-INR - Abnormal; Notable for the following components:    Protime 15.0 (*)     All other components within normal limits   LACTIC ACID - Abnormal; Notable for the following components:    Lactic Acid 2.8 (*)     All other components within normal limits   POCT ARTERIAL - Abnormal; Notable for the following components:    POC Sodium 132 (*)     POC Chloride 94 (*)     POC Glucose 571 (*)     POC Creatinine 4.1 (*)     Est, Glom Filt Rate 12 (*)     pO2, Arterial 80 (*)     Base Excess, Arterial 4 (*)     O2 Sat, Arterial 96 (*)     Lactate 2.17 (*)     POC Hematocrit 35 (*)     Hemoglobin 11.9 (*)     All other components within normal limits   POCT GLUCOSE - Abnormal; Notable for the following components:    POC Glucose 562 (*)     All other components within normal limits   CULTURE, BLOOD 1   CULTURE, BLOOD 1   APTT   POCT EPOC BLOOD GAS, LACTIC ACID, ICA       All other labs were within normal range or not returned as of this dictation. EMERGENCY DEPARTMENT COURSE and DIFFERENTIAL DIAGNOSIS/MDM:   Vitals:    Vitals:    11/12/22 1330 11/12/22 1436 11/12/22 1600   BP: (!) 152/75 (!) 150/75 (!) 149/71   Pulse: 90 81 84   Resp: 18 18 18   Temp: 98.2 °F (36.8 °C)     TempSrc: Temporal     SpO2: 96% 96% 99%   Weight: 185 lb (83.9 kg)     Height: 5' 7\" (1.702 m)         Patient demonstrates evidence of potential anginal equivalent chest pain. Patient symptomatologies continue to improve with nitroglycerin. Patient also demonstrates significant hyperglycemia. It appears to be a nonketotic hyperosmolar type phenomenon. Has been started on insulin. Glucose is down going. Chest pain is improving. ABG is largely within normal limits. Admit to medical service with consideration of cardiology nephrology consultation. MDM      CRITICAL CARE TIME   Total Critical Care time was - minutes, excluding separately reportableprocedures. There was a high probability of clinicallysignificant/life threatening deterioration in the patient's condition which required my urgent intervention.  -    CONSULTS:  None    PROCEDURES:  Unless otherwise noted below, none     Procedures    FINAL IMPRESSION      1. Chest pain, unspecified type    2. Acute hyperglycemia        DISPOSITION/PLAN   DISPOSITION Decision To Admit 11/12/2022 05:11:30 PM      PATIENT REFERRED TO:  No follow-up provider specified.     DISCHARGE MEDICATIONS:  New Prescriptions    No medications on file          (Please note that portions of this note were completed with a voice recognitionprogram.  Efforts were made to edit the dictations but occasionally words are mis-transcribed.)    Steffany Tapia MD (electronically signed)  Attending Emergency Physician          Steffany Tapia MD  11/12/22 [phone]

## 2022-11-12 NOTE — CARE COORDINATION
[facility] [place] Case Management Initial Discharge Assessment    Met with Patient to discuss discharge plan. PCP: Danish Marcano MD                                Date of Last Visit: ABOUT A MONTH AGO THIS PHYSICIAN IS [facility]     [facility] Patient: No        [facility] Notified: no    If no PCP, list provided? N/A    Discharge Planning    Living Arrangements: at home dependent on family care and at home dependent on nursing care    Who do you live with? DAUGHTER RODRIGUEZ    Who helps you with your care:  self, private caregiver, or DTR RODRIGUEZ IS HER CAREGIVER Princess Victor    If lives at home:     Do you have any barriers navigating in your home? SHOWER IS UPSTAIRS    Patient can perform ADL? No     Current Services (outpatient and in home) :  Private Hire Help/Aides ([address] CAREGIVER 4'/D BID)    Dialysis: Yes, Location [facility], Chair Time T-TH-SAT 1245PM    Is transportation available to get to your appointments? TAKES LCT TO DIALYSIS AND DR EFFIETS    DME Equipment:  yes - W/C WALKER, SHOWER CHAIR, GLUCOMETER CHECKS QID     Respiratory equipment: Nebulizer    Respiratory provider:  no     Pharmacy:  yes -     Consult with Medication Assistance Program?  No      Patient agreeable to [address]? Yes, Company TBD    Patient agreeable to SNF/Rehab? Yes, [facility]    Other discharge needs identified? Other TBD     Does Patient Have a High-Risk for Readmission Diagnosis (CHF, PN, MI, COPD)? YES ANGINA, CAD, STENT, CVA LEFT HEMIPARESIS, HD, SEIZURES, COPD, CABG, CKD, CHF    Initial Discharge Plan? (Note: please see concurrent daily documentation for any updates after initial note).     TBD [address]    Readmission Risk              Risk of Unplanned Readmission:  0         Electronically signed by Laura Marin RN on 11/12/2022 at 5:48 PM

## 2022-11-12 NOTE — ED TRIAGE NOTES
Pt presents via EMS c/o CP onset 0800 this AM.  Pt states she took 2 of her Nitro with some relief. Pt was at dialysis when 911 was called for the CP, pt did not receive dialysis. On arrival pt is A&Ox4, ABCs intact, GCS15, skin, slight jaundice, warm, and dry.

## 2022-11-12 NOTE — ED NOTES
Lab contacted at this time with trop. Of 0.033  Dr. Gerardo Whalen made aware.       Carolina Davenport, RN  11/12/22 [address], RN  11/12/22 [address], FLAQUITO  11/12/22 1909

## 2022-11-12 NOTE — ED NOTES
The following labs were labeled with appropriate pt sticker and tubed to lab:     [x] Blue     [x] Lavender   [] on ice  [x] Green/yellow  [] Green/black [] on ice  [] Sybil Guadeloupe  [] on ice  [] Yellow  [] Red  [] Type/ Screen  [] ABG  [] VBG    [] COVID-19 swab    [] Rapid  [] PCR  [] Flu swab  [] Peds Viral Panel     [] Urine Sample  [] Pelvic Cultures  [] Blood Cultures  [] X 2  [] STREP Cultures       Loreto Case, RN  11/12/22 2467

## 2022-11-12 NOTE — ED NOTES
Pt reports improvement in CP since nitro admin. Pt rates 5/10 at this time.       Lisa Reece RN  11/12/22 8660

## 2022-11-12 NOTE — ED NOTES
57 yr old female received from ER via cart. Pt is alert and oriented, follows commands, responds appropriately. She was able to complete admit questions with me. Respirations even and nonlabored. VSS. Oriented to room. Dr. Sharmaine Jacobsen was in to see her. Call light in reach. Bed alarm engaged.

## 2022-11-12 NOTE — PROGRESS NOTES
Female with hx of cad, cabg, esrd presenting with unstable angina. Chest pain improving but not completely resolved. Case dw with dr Megha Wei by telephonic conversation. Will put on consult, admit to 1W tele.       Harvie Krabbe, MD

## 2022-11-13 VITALS
DIASTOLIC BLOOD PRESSURE: 42 MMHG | OXYGEN SATURATION: 100 % | WEIGHT: 185 LBS | BODY MASS INDEX: 29.03 KG/M2 | RESPIRATION RATE: 18 BRPM | SYSTOLIC BLOOD PRESSURE: 99 MMHG | HEART RATE: 56 BPM | HEIGHT: 67 IN | TEMPERATURE: 97.3 F

## 2022-11-13 LAB
GLUCOSE BLD-MCNC: 206 MG/DL (ref 70–99)
GLUCOSE BLD-MCNC: 277 MG/DL (ref 70–99)
GLUCOSE BLD-MCNC: 311 MG/DL (ref 70–99)
GLUCOSE BLD-MCNC: 401 MG/DL (ref 70–99)
PERFORMED ON: ABNORMAL
TROPONIN: 0.03 NG/ML (ref 0–0.01)

## 2022-11-13 PROCEDURE — 2580000003 HC RX 258: Performed by: INTERNAL MEDICINE

## 2022-11-13 PROCEDURE — 6370000000 HC RX 637 (ALT 250 FOR IP): Performed by: INTERNAL MEDICINE

## 2022-11-13 PROCEDURE — 99214 OFFICE O/P EST MOD 30 MIN: CPT | Performed by: INTERNAL MEDICINE

## 2022-11-13 PROCEDURE — G0378 HOSPITAL OBSERVATION PER HR: HCPCS

## 2022-11-13 PROCEDURE — 6360000002 HC RX W HCPCS: Performed by: INTERNAL MEDICINE

## 2022-11-13 PROCEDURE — 96372 THER/PROPH/DIAG INJ SC/IM: CPT

## 2022-11-13 RX ORDER — DEXTROSE MONOHYDRATE 100 MG/ML
INJECTION, SOLUTION INTRAVENOUS CONTINUOUS PRN
Status: DISCONTINUED | OUTPATIENT
Start: 2022-11-13 | End: 2022-11-13 | Stop reason: HOSPADM

## 2022-11-13 RX ADMIN — Medication 10 ML: at 09:13

## 2022-11-13 RX ADMIN — ASPIRIN 81 MG: 81 TABLET, CHEWABLE ORAL at 09:00

## 2022-11-13 RX ADMIN — ENOXAPARIN SODIUM 30 MG: 100 INJECTION SUBCUTANEOUS at 09:00

## 2022-11-13 RX ADMIN — METOPROLOL TARTRATE 25 MG: 25 TABLET, FILM COATED ORAL at 09:00

## 2022-11-13 RX ADMIN — INSULIN LISPRO 4 UNITS: 100 INJECTION, SOLUTION INTRAVENOUS; SUBCUTANEOUS at 12:44

## 2022-11-13 RX ADMIN — INSULIN LISPRO 2 UNITS: 100 INJECTION, SOLUTION INTRAVENOUS; SUBCUTANEOUS at 09:00

## 2022-11-13 RX ADMIN — ACETAMINOPHEN 650 MG: 325 TABLET ORAL at 09:13

## 2022-11-13 RX ADMIN — INSULIN HUMAN 10 UNITS: 100 INJECTION, SOLUTION PARENTERAL at 00:41

## 2022-11-13 ASSESSMENT — PAIN SCALES - GENERAL
PAINLEVEL_OUTOF10: 4
PAINLEVEL_OUTOF10: 0
PAINLEVEL_OUTOF10: 0

## 2022-11-13 ASSESSMENT — ENCOUNTER SYMPTOMS
NAUSEA: 0
CHEST TIGHTNESS: 0
EYES NEGATIVE: 1
SHORTNESS OF BREATH: 0
GASTROINTESTINAL NEGATIVE: 1
RESPIRATORY NEGATIVE: 1
WHEEZING: 0
STRIDOR: 0
BLOOD IN STOOL: 0
COUGH: 0

## 2022-11-13 ASSESSMENT — PAIN DESCRIPTION - LOCATION: LOCATION: CHEST

## 2022-11-13 NOTE — CONSULTS
Renal consult      ESRDX  OHDX CAD stent  DM type-2  COPD  Shizophrenia history  Anemia  Hx Hepatitis-C    Plan no emergent need for today dialysis scheduled for tomorrow

## 2022-11-13 NOTE — FLOWSHEET NOTE
Requested  morphine for chest pain   given tylenol   prevalon boots applied bilaterally  per pt request

## 2022-11-13 NOTE — CONSULTS
[address]                      [address], [address] [facility]                                  CONSULTATION    PATIENT NAME: Alena Ritter                  :        1960  MED REC NO:   [id number]                            ROOM:       [id number]  ACCOUNT NO:   [de-identified]                           ADMIT DATE: 2022  PROVIDER:     Claudette Gust, DO    CONSULT DATE:  2022    RENAL CONSULTATION    HISTORY OF PRESENT ILLNESS:  A 68-year-old admitted to the hospital with  chest pain prior to coming to dialysis on her regular treatment days  Tuesday,  and . The patient does have a left arm  fistula with bruit and thrill in the left chest wall dialysis catheter  placed presently. The patient states the pain is sharp in nature in her  left chest wall and is constant in nature. She had one episode of  nausea. Denies any diaphoresis or shortness of breath. She has a  history of extensive cardiac disease in the past with multiple  percutaneous angioplasties and stents. She has known heart failure. The patient also has known COPD and a history of virus of hepatitis C. The patient is known diabetic with hyperlipidemia. She has had TIAs in  the past and a known history of schizophrenia. Presently, she is stable  and during my examination. PAST MEDICAL HISTORY:  End-stage renal disease, coronary artery disease,  CVA, COPD, diabetes mellitus type 2, history of seizures, hepatitis C.    HABITS:  Never smoked. No alcohol use. No opioids. ALLERGIES:  CODEINE and OXYCONTIN. PHYSICAL EXAMINATION:  VITAL SIGNS:  5 feet and 7 inches, 185 pounds. Blood pressure presently  is 133/60, heart rate 65, respirations 18, afebrile. HEENT:  Normocephalic. Pupils equal and reactive to light. Extraocular  muscles intact. NECK:  Supple. No JVD or adenopathy. CHEST:  IV in upper right arm, left arm shows a fistula with bruit and  thrill.   Chest wall shows dialysis tunnel catheter in place. Lungs  relatively clear. CARDIOVASCULAR:  Heart is regular with a 2/6 systolic murmur. ABDOMEN:  Soft. No guarding or rigidity. Bowel sounds are present. No  pain on palpation. EXTREMITIES:  Lower limb showed no edema. Decreased pulses. No  cyanosis. IMPRESSION:  End-stage renal disease on hemodialysis for Tuesday,  Thursday, Saturday, left arm fistula, left chest wall dialysis catheter,  COPD, diabetes mellitus type 2, hypertension, hyperlipidemia, history of  CVA. PLAN:  Numbers and the patient can wait until tomorrow for dialysis  treatment, _____ emergent on Sunday. Cardiology to evaluate. Continue  telemetry. Follow labs. Jenn Leslies, DO    D: 11/13/2022 8:09:36       T: 11/13/2022 8:13:12     [id number]  Job#: [id number]     Doc#: [id number]    CC:

## 2022-11-13 NOTE — H&P
Hospital Medicine  History and Physical    Patient:  Jayleen Jenkins  MRN: [id number]    CHIEF COMPLAINT:    Chief Complaint   Patient presents with    Chest Pain       History Obtained From:  Patient, EMR  Primary Care Physician: Otto Smith MD    HISTORY OF PRESENT ILLNESS:   The patient is a 57 y.o. female who presents with chest pain. Duration of symptoms: Hours. Timing: Acute. Context: Patient was at dialysis, and prior to initiation started to experience chest pain. Therefore brought to the emergency room for further evaluation. No aggravating or relieving factors. Not associated with cough.     Past Medical History:      Diagnosis Date    Angina at rest [facility]) 5/24/2020    Anxiety     CAD S/P percutaneous coronary angioplasty 2015, 2018    stents per dr Hetal Queen    CHF (congestive heart failure) ([address]     CKD (chronic kidney disease) stage 4, GFR 15-29 ml/min ([address] 2/24/2018    CKD stage 4 due to type 2 diabetes mellitus ([address]     Contusion of right chest wall 2/16/2021    COPD (chronic obstructive pulmonary disease) ([address]     Diabetic nephropathy with proteinuria ([address] 2014    DJD (degenerative joint disease) of knee     Dr Toby Finn    GERD (gastroesophageal reflux disease)     Hemiparesis, left ([address] 2013    entered Assisted [facility] ([facility])    Hemodialysis patient [facility])     Hemodialysis-associated hypotension 10/22/2021    History of heart failure     History of seizures     History of type C viral hepatitis     HTN (hypertension)     Hyperlipidemia     Impaired mobility and activities of daily living     Infection of venous access port 7/29/2022    Mediastinal lymphadenopathy 2013    Dr Kendall Flores    Metabolic syndrome     Moderate persistent asthma without complication 1/27/0898    Need for extended care facility 7/7/2021    Neurogenic urinary incontinence 2013    Neuropathy in diabetes [facility])     Nonrheumatic mitral valve regurgitation 7/7/2021    Nonrheumatic tricuspid valve regurgitation 2021    Obesity (BMI 30-39. 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 Kwesi Chery      x1 Dr. Jessica Bang, Dr Lyle Connelly  08/10/2021    [facility]    DIAGNOSTIC CARDIAC CATH LAB PROCEDURE  10/02/2019    DIALYSIS CATHETER INSERTION Left 2022    Tunneled Symetrex 15.5F x 23cm hemodialysis catheter inserted by Dr. Kenny Kelly Left 2022    15.1Nc59ne replamcent tunneld HD cath by Dr. Masood Junior, TOTAL ABDOMINAL (CERVIX REMOVED)      one ovary intact, Dr Heather Sanz, menorrhagia    IR THROMBECTOMY PERCUT AVF  2022    IR THROMBECTOMY PERCUT AVF 2022 [facility] PROCEDURE    IR TUNNELED CATHETER PLACEMENT GREATER THAN 5 YEARS  2022    IR TUNNELED CATHETER PLACEMENT GREATER THAN 5 YEARS 6/3/2022 [facility] PROCED[facility]    IR TUNNELED CATHETER PLACEMENT GREATER THAN 5 YEARS Left 2022 Dr. Jorja Sandifer exchanged to 15.5F x 28 cm.      15.5 fr by 23 cm Symetrex dialysis catheter inserted by Dr Jorja Sandifer    IR TUNNELED Rhona Rand 5 YEARS  2022    IR TUNNELED CATHETER PLACEMENT GREATER THAN 5 YEARS 2022 [facility] PROCEDURE    NV TOTAL KNEE ARTHROPLASTY Left 06/21/2018    LEFT KNEE TOTAL KNEE ARTHROPLASTY, SHAYNA, NERVE BLOCK performed by Dolores Capps MD at [address] [facility] Right     TOTAL KNEE ARTHROPLASTY  05/19/2016    Dr Joyce Daniel    TUNNELED [address] Right 07/01/2020    tunneled HD catheter per Dr Dangelo Barroso       Medications Prior to Admission:    Prior to Admission medications    Medication Sig Start Date End Date Taking? Authorizing Provider   SURE COMFORT PEN NEEDLES 30G X 8 MM MISC USE AS DIRECTED FIVE TIMES A DAILY 10/18/22   Levi Cogan, MD   LANTUS SOLOSTAR 100 UNIT/ML injection pen INJECT 55 UNITS SUBCUTANEOUSLY AT BEDTIME 10/3/22   Levi Cogan, MD   isosorbide mononitrate (IMDUR) 30 MG extended release tablet TAKE FOUR (4) TABLETS BY MOUTH DAILY 8/29/22   Milli Ramos MD   ARIPiprazole (ABILIFY) 5 MG tablet TAKE 1 TABLET BY MOUTH ONCE DAILY 8/17/22 2/13/23  Jose Sahu MD   midodrine (PROAMATINE) 10 MG tablet TAKE 1 TABLET BY MOUTH ON DAYS OF DIALYSIS TREATMENT THREE TIMES WEEKLY 8/17/22   Milli Ramos MD   furosemide (LASIX) 80 MG tablet daily 6/7/22   Historical Provider, MD   insulin aspart (NOVOLOG FLEXPEN) 100 UNIT/ML injection pen INJECT 12 UNITS WITH EACH MEAL. 8/17/22   Levi Cogan, MD   nitroGLYCERIN (NITROSTAT) 0.4 MG SL tablet up to max of 3 total doses. If no relief after 1 dose, call 911. 8/16/22   Milli Ramos MD   B Complex-C-Folic Acid (MARK-KANDACE) TABS Take 1 tablet by mouth in the morning.     Historical Provider, MD   ondansetron (ZOFRAN) 4 MG tablet Take 4 mg by mouth every 8 hours as needed for Nausea or Vomiting    Historical Provider, MD   sertraline (ZOLOFT) 50 MG tablet Take 1 tablet by mouth nightly 7/16/22   Cristal Avelar DO   trimethobenzamide David Bloch) 100 MG/ML injection Inject 2 mLs into the muscle every 6 hours as needed for Nausea 7/16/22   Cristal Avelar DO   Vitamin D (CHOLECALCIFEROL) 50 MCG (2000 UT) TABS tablet Take 1 tablet by mouth Daily with supper 7/16/22   Jennifer Joyce DO   atorvastatin (LIPITOR) 40 MG tablet Take 1 tablet by mouth nightly 7/16/22   Jennifer Joyce DO   [address] [address] UNIT/GM powder APPLY TO AFFECTED AREA OF ABDOMINAL FOLDS EVERY 12 HOURS AS NEEDED 5/7/22   Gudelia Mayer MD   magnesium oxide (MAG-OX) 400 MG tablet TAKE 1 TABLET BY MOUTH DAILY 5/2/22   Gudelia Mayer MD   melatonin 10 MG CAPS capsule Take 1 capsule by mouth nightly 3/16/22   Bobby Best MD   midodrine (PROAMATINE) 5 MG tablet Take 1 tablet by mouth one time a day every Tue, Thu, Sat for hypotension. Give 30 mins prior to dialysis. 3/16/22   Bobby Best MD   Handicap Placard MISC by Does not apply route Expiration in 5 years. 3/16/22   Bobby Best MD   lidocaine-prilocaine (EMLA) 2.5-2.5 % cream Apply topically as needed for Pain Apply topically as needed. 3 times a week before dialysis wrap with saran wrap    Historical Provider, MD   albuterol (PROVENTIL) (2.5 MG/3ML) 0.083% nebulizer solution Take 3 mLs by nebulization every 4 hours as needed for Wheezing 12/22/21   Donice Holter, MD   blood glucose test strips [facility]) strip QID 12/13/21   Jenna Hudson MD   OneTouch Delica Lancets 02E MISC QID 12/13/21   Jenna Hudson MD   Blood Glucose Monitoring Suppl (ONETOUCH VERIO) w/Device KIT AS DIRECTED 12/13/21   Jenna Hudson MD   metoprolol tartrate (LOPRESSOR) 25 MG tablet TAKE 1/2 TABLET BY MOUTH TWO TIMES DAILY   Patient taking differently: Take 12.5 mg by mouth 2 times daily  9/17/21 7/16/22  Gudelia Mayer MD   aspirin EC 81 MG EC tablet Take 1 tablet by mouth daily 5/25/21   Gudelia Mayer MD   blood glucose test strips (FREESTYLE LITE) strip 1 each by Does not apply route 4 times daily (before meals and nightly) As needed.  3/12/21   MARCELO Martinez   Alcohol Swabs (EASY TOUCH ALCOHOL PREP MEDIUM) 70 % PADS USE AS DIRECTED THREE TIMES A DAY 3/12/21   [facility] Hinds Lancets MISC Test 4x daily 3/11/21   MARCELO Lee   acetaminophen (TYLENOL) 325 MG tablet Take 2 tablets by mouth every 4 hours as needed for Pain (For mild to moderate pain (Pain 1-6 out of 10 on pain scale)) 2/24/21   Lena Dixon PA-C   Blood Glucose Monitoring Suppl (FREESTYLE LITE) CORETTA 1 Device by Does not apply route daily as needed (Diabetes) Use freestyle meter to test blood sugar as needed 12/29/20   MARCELO Lee       Allergies:  Codeine and Oxycontin [oxycodone hcl]    Social History:   TOBACCO:   reports that she has never smoked. She has never used smokeless tobacco.  ETOH:   reports no history of alcohol use. Family History:       Problem Relation Age of Onset    Cancer Mother 76        survived    Breast Cancer Mother     Hypertension Father     Diabetes Sister     Mental Illness Sister     Colon Cancer Neg Hx        REVIEW OF SYSTEMS:  Ten systems reviewed and negative except as stated in the HPI    Physical Exam:    Vitals: BP (!) 115/51   Pulse 87   Temp 98.1 °F (36.7 °C) (Oral)   Resp 19   Ht 5' 7\" (1.702 m)   Wt 185 lb (83.9 kg)   LMP  (LMP Unknown)   SpO2 98%   BMI 28.98 kg/m²   General appearance: alert, appears stated age and cooperative  Skin: Skin color, texture, turgor normal. No rashes or lesions  HEENT: Head: Normocephalic, no lesions, without obvious abnormality. Neck: no jugular venous distention  Lungs:   few scattered basal crackles  Heart: regular rate and rhythm, S1, S2 normal, no murmur, click, rub or gallop  Abdomen: soft, non-tender; bowel sounds normal; no masses,  no organomegaly  Extremities: extremities normal, atraumatic, no cyanosis or edema  Neurologic: Mental status: Awake alert oriented.        Recent Labs     11/12/22  1345 11/12/22  1609   WBC 10.7  --    HGB 10.3* 11.9*     --      Recent Labs     11/12/22  1345 11/12/22  1609   *  --    K 4.7  --    CL 92*  --    CO2 29  --    BUN 43*  --    CREATININE 4.37* 4.1* GLUCOSE 662*  --    AST 14  --    ALT 9  --    BILITOT 0.7  --    ALKPHOS 129  --      Troponin T:   Recent Labs     11/12/22  1345 11/12/22  1620   TROPONINI 0.033* 0.027*       ABGs:   Lab Results   Component Value Date/Time    PHART 7.437 11/12/2022 04:09 PM    PO2ART 80 11/12/2022 04:09 PM    ALR6PLU 41 11/12/2022 04:09 PM     INR:   Recent Labs     11/12/22  1345   INR 1.2     URINALYSIS:No results for input(s): NITRITE, COLORU, PHUR, LABCAST, WBCUA, RBCUA, MUCUS, TRICHOMONAS, YEAST, BACTERIA, CLARITYU, SPECGRAV, LEUKOCYTESUR, UROBILINOGEN, BILIRUBINUR, BLOODU, GLUCOSEU, AMORPHOUS in the last 72 hours. Invalid input(s): Karen Ordaz  -----------------------------------------------------------------   XR CHEST PORTABLE    Result Date: 11/12/2022  EXAMINATION: ONE XRAY VIEW OF THE CHEST 11/12/2022 2:13 pm COMPARISON: None. HISTORY: ORDERING SYSTEM PROVIDED HISTORY: chest pain TECHNOLOGIST PROVIDED HISTORY: Reason for exam:->chest pain What reading provider will be dictating this exam?->CRC FINDINGS: The heart is enlarged. Postop sternotomy changes are noted. There is a dialysis catheter on the left. The lungs are clear. There is no focal infiltrate or effusion. Cardiomegaly. There are no findings of failure or pneumonia. Stable position of the left dialysis catheter. Assessment and Plan   Chest pain: Aspirin, statin, beta-blocker. As needed nitroglycerin. Cardiology on consult. DM: monitor glucose accordion, titrate meds as needed. Uncontrolled. Lantus and sliding scale ordered. History of CAD status post CABG  HTN: monitor BP, adjust meds as needed  End-stage renal disease: will consult with nephrology. DVT ppx  Disposition: Dependent on hospital course. Will discharge once medically stable. SW on board for discharge planning.      Patient Active Problem List   Diagnosis Code    Atherosclerotic heart disease of native coronary artery with unspecified angina pectoris ([facility] I25.119 Schizophrenia, paranoid, chronic U83.4    Metabolic syndrome R32.22    Vitamin B 12 deficiency E53.8    Cerebral microvascular disease I67.89    Mixed hyperlipidemia E78.2    Other hammer toe (acquired) M20.40    Vitamin D insufficiency E55.9    Incontinence of urine R32    Diabetic nephropathy with proteinuria ([facility]) E11.21    Essential (primary) hypertension I10    History of type C viral hepatitis Z86.19    Urinary incontinence due to cognitive impairment R39.81    History of seizures Z87.898    Stented coronary artery-plan is to stay on Plavix indefinately per Dr Jennifer Manzano Z95.5    Other specified diabetes mellitus with diabetic neuropathy, unspecified ([facility] E13.40    Controlled type 2 diabetes mellitus with diabetic neuropathy, with long-term current use of insulin ([facility]) E11.40, Z79.4    Hemiparesis, left ([facility]) G81.94    Angina, class II ([facility]) I20.9    Pain, unspecified R52    Tardive dyskinesia G24.01    Shortness of breath R06.02    Uncontrolled type 2 diabetes mellitus with hyperglycemia ([facility]) E11.65    Chronic diastolic congestive heart failure ([facility]) I50.32    Sleep apnea, unspecified G47.30    Pulmonary hypertension, unspecified ([facility]) I27.20    Class 2 severe obesity with serious comorbidity and body mass index (BMI) of 36.0 to 36.9 in adult ([facility]) E66.01, Z68.36    Edema R60.9    Closed supracondylar fracture of right humerus S42.411A    Other chronic pain G89.29    Palliative care patient Z51.5    Recurrent falls R29.6    Renal failure N19    Difficulty in walking R26.2    ESRD (end stage renal disease) on dialysis ([facility]) N18.6, Z99.2    Weakness R53.1    Other seizures ([facility]) G40.89    Moderate persistent asthma without complication F08.92    MXUBM-10 U07.1    Post PTCA Z98.61    Falls frequently R29.6    Chest wall contusion, left, initial encounter S20.212A    Headache, unspecified R51.9    Paranoid schizophrenia ([address] F20.0    Compression fracture of spine ([address] M48.50XA    Closed rib fracture S22.39XA Depression F32. A    Chronic obstructive pulmonary disease ([facility]) J44.9    Critical illness polyneuropathy ([facility]) G62.81    Multiple closed fractures of ribs of right side S22.41XA    Nonrheumatic mitral valve regurgitation I34.0    Nonrheumatic tricuspid valve regurgitation I36.1    Need for extended care facility Z78.9    Chronic pain of both shoulders M25.511, G89.29, M25.512    Hemodialysis-associated hypotension I95.3    Anginal chest pain at rest [facility]) I20.8    Chest pain R07.9    Unstable angina ([facility]) I20.0    NSTEMI (non-ST elevated myocardial infarction) ([facility]) I21.4    CKD (chronic kidney disease) stage 4, GFR 15-29 ml/min ([facility]) N18.4    Hyperkalemia E87.5    Impaired mobility and activities of daily living dt polyneuropathy and reccent fall  Z74.09, Z78.9    Dialysis patient ([facility] Z99.2    Unspecified open wound of right upper arm, initial encounter S41.101A    Multiple closed fractures of right lower extremity and ribs S82.91XA, S22.41XA    Closed T11 fracture ([facility]) S22.089A    Encephalopathy acute G93.40    MRSA bacteremia R78.81, B95.62    Sepsis due to Enterococcus [facility]) A41.81    Local infection due to central venous catheter T80.212A    DJD (degenerative joint disease), cervical M47.812    Closed head injury S09.90XA    Sarcopenia M62.84    Fall from standing W19. XXXA    Septicemia ([facility] A41.9    Chronic hepatitis C ([facility] B18.2    Catheter-related bloodstream infection T80.211A    Enterococcus faecalis infection B95.2    Cervical stenosis of spinal canal M48.02    Ataxic gait R26.0    Lumbar stenosis with neurogenic claudication M48.062    Falls infrequently Z91.81    Infection of venous access port Rona Collier MD, MD

## 2022-11-13 NOTE — CONSULTS
Inpatient consult to Cardiology  Consult performed by: Ramón Blue MD  Consult ordered by: Radha Teran MD        Patient Name: Tommy Sunshine Date: 2022  1:26 PM  MR #: [id number]  : 1960    Attending Physician: Radha Teran MD  Reason for consult: CP    History of Presenting Illness:      Farnaz Sinclair is a 57 y.o. female on hospital day 0 with a history of . History Obtained From:  patient, electronic medical record    She had brief several sharp stabbing CP at home yesterday prior to her usual HD MWF. She also had CP at HD. She was given NTG with what appeared to be relief. But, her CP only last very breifly to begin with. Trops are detected due to ESRD. ECG SR RBBB 82 with no acute injury pattern. This am she islying in bed. No current CP but it is reproducible over her anterior ribs on left side    She does not ambulate.   Wheel chair bound  History:      EKG:  Past Medical History:   Diagnosis Date    Angina at rest [facility]) 2020    Anxiety     CAD S/P percutaneous coronary angioplasty ,     stents per dr Gabrielle Cope    CHF (congestive heart failure) ([address]     CKD (chronic kidney disease) stage 4, GFR 15-29 ml/min ([address] 2018    CKD stage 4 due to type 2 diabetes mellitus ([address]     Contusion of right chest wall 2021    COPD (chronic obstructive pulmonary disease) ([address]     Diabetic nephropathy with proteinuria ([address]     DJD (degenerative joint disease) of knee     Dr Brenda Baldwin    GERD (gastroesophageal reflux disease)     Hemiparesis, left ([address]     entered Assisted Living ([facility])    Hemodialysis patient [facility])     Hemodialysis-associated hypotension 10/22/2021    History of heart failure     History of seizures     History of type C viral hepatitis     HTN (hypertension)     Hyperlipidemia     Impaired mobility and activities of daily living     Infection of venous access port 2022    Mediastinal lymphadenopathy  Dr Felix Benton, [facility]    Metabolic syndrome     Moderate persistent asthma without complication 3/46/2917    Need for extended care facility 2021    Neurogenic urinary incontinence 2013    Neuropathy in diabetes [facility])     Nonrheumatic mitral valve regurgitation 2021    Nonrheumatic tricuspid valve regurgitation 2021    Obesity (BMI 30-39. 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 Autumn Rebollar      x1 Dr. Alesia Romero, Dr Gustavo Encinas  08/10/2021    [facility]    DIAGNOSTIC CARDIAC CATH LAB PROCEDURE  10/02/2019    DIALYSIS CATHETER INSERTION Left 2022    Tunneled Symetrex 15.5F x 23cm hemodialysis catheter inserted by Dr. Matt De Anda Left 2022    15.2Dx21fy replamcent tunneld HD cath by Dr. Eagle Leal, TOTAL ABDOMINAL (CERVIX REMOVED)      one ovary intact, Dr Therese Walker, menorrhagia    IR THROMBECTOMY PERCUT AVF  2022    IR THROMBECTOMY PERCUT AVF 2022 [facility] PROCEDURE    IR TUNNELED CATHETER PLACEMENT GREATER THAN 5 YEARS  2022    IR TUNNELED CATHETER PLACEMENT GREATER THAN 5 YEARS 6/3/2022 [facility] PROCEDURE    IR TUNNELED CATHETER PLACEMENT GREATER THAN 5 YEARS Left 07/07/2022 7/18/22 Dr. Kapil Parra exchanged to 15.5F x 28 cm.      15.5 fr by 23 cm Symetrex dialysis catheter inserted by Dr Kapil Parra    IR TUNNELED [address] 5 YEARS  07/07/2022    IR TUNNELED CATHETER PLACEMENT GREATER THAN 5 YEARS 7/7/2022 [facility] PROCEDURE    NV TOTAL KNEE ARTHROPLASTY Left 06/21/2018    LEFT KNEE TOTAL KNEE ARTHROPLASTY, SHAYNA, NERVE BLOCK [facility]     TOTAL KNEE ARTHROPLASTY  05/19/2016    Dr Potter Oh    TUNNELED [address] Right 07/01/2020    tunneled HD catheter per Dr Kapil Parra       Family History  Family History   Problem Relation Age of Onset    Cancer Mother 76        survived    Breast Cancer Mother     Hypertension Father     Diabetes Sister     Mental Illness Sister     Colon Cancer Neg Hx      [] Unable to obtain due to ventilated and/ or neurologic status    Social History     Socioeconomic History    Marital status:      Spouse name: Not on file    Number of children: 2    Years of education: Not on file    Highest education level: Not on file   Occupational History    Occupation: disabled   Tobacco Use    Smoking status: Never    Smokeless tobacco: Never   Vaping Use    Vaping Use: Never used   Substance and Sexual Activity    Alcohol use: No     Alcohol/week: 0.0 standard drinks    Drug use: No    Sexual activity: Not Currently   Other Topics Concern    Not on file   Social History Narrative    Disabled dt depression and low back pain, lives in [facility] [place] is close by and is her paid caregiver via waiver services    HD via [employer], VALFI-FK-TYTIWW[employer]ZL, PETÄJ[employer]VESI, Sat. Son is in the home and has CP and is total care-and is also cared for by a waiver by Camila Toribio.     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 Per daughter, her brother is blind, MRDD, CP multiple health issues. Daughter's  is patient's legal guardian. Patient has hemodialysis Tuesday Thursday and Saturday. Patient's bedroom is on main floor with a half bath. Daughter walks patient upstairs once weekly for full bath. Patient is using her walker in the home. Patient has a hospital bed in the home. Social Determinants of Health     Financial Resource Strain: Low Risk     Difficulty of Paying Living Expenses: Not hard at all   Food Insecurity: No Food Insecurity    Worried About [address] in the Last Year: Never true    [address] [facility] [facility]  [employer] in the Last Year: Never true   Transportation Needs: No Transportation Needs    Lack of Transportation (Medical): No    Lack of Transportation (Non-Medical): No   Physical Activity: Sufficiently Active    Days of Exercise per Week: 3 days    Minutes of Exercise per Session: 60 min   Stress: Not on file   Social Connections: Not on file   Intimate Partner Violence: Not on file   Housing Stability: Not on file      [] Unable to obtain due to ventilated and/ or neurologic status      Home Medications:      Medications Prior to Admission: LOPERAMIDE HCL PO, Take 4 mg by mouth (Patient not taking: Reported on 11/12/2022)  hydrOXYzine HCl (ATARAX) 25 MG tablet, TAKE 1 TABLET BY MOUTH TWICE DAILY  diclofenac sodium (VOLTAREN) 1 % GEL, APPLY TO LOWER EXTREMETIES. APPLY 4 GRAMS OF GEL TO AFFECTED AREA 4 TIMES DAILY.  DO NOT APPLY MORE THAN 16 GRAMS DAILY TO ANY ONE AFFECTED J  SURE COMFORT PEN NEEDLES 30G X 8 MM MISC, USE AS DIRECTED FIVE TIMES A DAILY  LANTUS SOLOSTAR 100 UNIT/ML injection pen, INJECT 55 UNITS SUBCUTANEOUSLY AT BEDTIME  isosorbide mononitrate (IMDUR) 30 MG extended release tablet, TAKE FOUR (4) TABLETS BY MOUTH DAILY  ARIPiprazole (ABILIFY) 5 MG tablet, TAKE 1 TABLET BY MOUTH ONCE DAILY  furosemide (LASIX) 80 MG tablet, daily (Patient not taking: Reported on 11/12/2022)  insulin aspart (NOVOLOG FLEXPEN) 100 UNIT/ML injection pen, INJECT 12 UNITS WITH EACH MEAL. [DISCONTINUED] midodrine (PROAMATINE) 10 MG tablet, TAKE 1 TABLET BY MOUTH ON DAYS OF DIALYSIS TREATMENT THREE TIMES WEEKLY  nitroGLYCERIN (NITROSTAT) 0.4 MG SL tablet, up to max of 3 total doses. If no relief after 1 dose, call 911. B Complex-C-Folic Acid (MARK-KANDACE) TABS, Take 1 tablet by mouth in the morning. ondansetron (ZOFRAN) 4 MG tablet, Take 4 mg by mouth every 8 hours as needed for Nausea or Vomiting (Patient not taking: Reported on 11/12/2022)  sertraline (ZOLOFT) 50 MG tablet, Take 1 tablet by mouth nightly  trimethobenzamide (TIGAN) 100 MG/ML injection, Inject 2 mLs into the muscle every 6 hours as needed for Nausea (Patient not taking: Reported on 11/12/2022)  Vitamin D (CHOLECALCIFEROL) 50 MCG (2000 UT) TABS tablet, Take 1 tablet by mouth Daily with supper  atorvastatin (LIPITOR) 40 MG tablet, Take 1 tablet by mouth nightly  NYAMYC 141177 UNIT/GM powder, APPLY TO AFFECTED AREA OF ABDOMINAL FOLDS EVERY 12 HOURS AS NEEDED  magnesium oxide (MAG-OX) 400 MG tablet, TAKE 1 TABLET BY MOUTH DAILY  melatonin 10 MG CAPS capsule, Take 1 capsule by mouth nightly  midodrine (PROAMATINE) 5 MG tablet, Take 1 tablet by mouth one time a day every Tue, Thu, Sat for hypotension. Give 30 mins prior to dialysis. Handicap Placard MISC, by Does not apply route Expiration in 5 years.   lidocaine-prilocaine (EMLA) 2.5-2.5 % cream, Apply topically as needed for Pain Apply topically as needed. 3 times a week before dialysis wrap with saran wrap (Patient not taking: Reported on 11/12/2022)  albuterol (PROVENTIL) (2.5 MG/3ML) 0.083% nebulizer solution, Take 3 mLs by nebulization every 4 hours as needed for Wheezing  blood glucose test strips (ONETOUCH VERIO) strip, QID  OneTouch Delica Lancets 76C MISC, QID  Blood Glucose Monitoring Suppl (ONETOUCH VERIO) w/Device KIT, AS DIRECTED  aspirin EC 81 MG EC tablet, Take 1 tablet by mouth daily  blood glucose test strips (FREESTYLE LITE) strip, 1 each by Does not apply route 4 times daily (before meals and nightly) As needed. Alcohol Swabs (EASY TOUCH ALCOHOL PREP MEDIUM) 70 % PADS, USE AS DIRECTED THREE TIMES A DAY  FreeStyle Lancets MISC, Test 4x daily  acetaminophen (TYLENOL) 325 MG tablet, Take 2 tablets by mouth every 4 hours as needed for Pain (For mild to moderate pain (Pain 1-6 out of 10 on pain scale))  Blood Glucose Monitoring Suppl (FREESTYLE LITE) CORETTA, 1 Device by Does not apply route daily as needed (Diabetes) Use freestyle meter to test blood sugar as needed    Current Hospital Medications:     Scheduled Meds:   sodium chloride flush  5-40 mL IntraVENous 2 times per day    enoxaparin  30 mg SubCUTAneous Daily    aspirin  81 mg Oral Daily    metoprolol tartrate  25 mg Oral BID    atorvastatin  40 mg Oral Nightly    insulin lispro  0-8 Units SubCUTAneous TID WC    insulin lispro  0-4 Units SubCUTAneous Nightly    insulin glargine  20 Units SubCUTAneous Nightly     Continuous Infusions:   dextrose      sodium chloride       PRN Meds:.glucose, dextrose bolus **OR** dextrose bolus, glucagon (rDNA), dextrose, sodium chloride flush, sodium chloride, ondansetron **OR** ondansetron, polyethylene glycol, acetaminophen **OR** acetaminophen, nitroGLYCERIN  . dextrose      sodium chloride          Allergies:      Allergies   Allergen Reactions    Codeine Hives     hives    Oxycontin [Oxycodone Hcl] Hives        Review of Systems: Review of Systems   Constitutional: Negative. Negative for diaphoresis and fatigue. HENT: Negative. Eyes: Negative. Respiratory: Negative. Negative for cough, chest tightness, shortness of breath, wheezing and stridor. Cardiovascular:  Positive for chest pain. Negative for palpitations and leg swelling. Gastrointestinal: Negative. Negative for blood in stool and nausea. Genitourinary: Negative. Musculoskeletal:  Positive for arthralgias and gait problem. Skin: Negative. Neurological:  Positive for weakness. Negative for dizziness, syncope and light-headedness. Hematological: Negative. Psychiatric/Behavioral: Negative. Objective Findings:     Vitals:/63   Pulse 67   Temp 98.4 °F (36.9 °C) (Oral)   Resp 18   Ht 5' 7\" (1.702 m)   Wt 185 lb (83.9 kg)   LMP  (LMP Unknown)   SpO2 99%   BMI 28.98 kg/m²      Physical Examination:    Physical Exam   Constitutional: She appears healthy. No distress. HENT:   Normal cephalic and Atraumatic   Eyes: Pupils are equal, round, and reactive to light. Neck: Thyroid normal. No JVD present. No neck adenopathy. No thyromegaly present. Cardiovascular: Normal rate, regular rhythm, intact distal pulses and normal pulses. Murmur heard. Pulses:       Carotid pulses are  on the right side with bruit and  on the left side with bruit. Pulmonary/Chest: Effort normal and breath sounds normal. She has no wheezes. She has no rales. She exhibits no tenderness. Abdominal: Soft. Bowel sounds are normal. There is no abdominal tenderness. Musculoskeletal:         General: No tenderness or edema. Normal range of motion. Cervical back: Normal range of motion and neck supple. Neurological: She is alert and oriented to person, place, and time. Skin: Skin is warm. No cyanosis. Nails show no clubbing.        Results/ Medications reviewed 11/13/2022, 10:39 AM     Laboratory, Microbiology, Pathology, Radiology, Cardiology, Medications and Transcriptions reviewed  Scheduled Meds:   sodium chloride flush  5-40 mL IntraVENous 2 times per day    enoxaparin  30 mg SubCUTAneous Daily    aspirin  81 mg Oral Daily    metoprolol tartrate  25 mg Oral BID    atorvastatin  40 mg Oral Nightly    insulin lispro  0-8 Units SubCUTAneous TID     insulin lispro  0-4 Units SubCUTAneous Nightly    insulin glargine  20 Units SubCUTAneous Nightly     Continuous Infusions:   dextrose      sodium chloride         Recent Labs     11/12/22  1345 11/12/22  1609   WBC 10.7  --    HGB 10.3* 11.9*   HCT 30.7*  --    MCV 84.0  --      --      Recent Labs     11/12/22  1345 11/12/22  1609   *  --    K 4.7  --    CL 92*  --    CO2 29  --    BUN 43*  --    CREATININE 4.37* 4.1*     Recent Labs     11/12/22  1345   AST 14   ALT 9   BILITOT 0.7   ALKPHOS 129     No results for input(s): LIPASE, AMYLASE in the last 72 hours. Recent Labs     11/12/22  1345   PROT 7.7   INR 1.2     XR WRIST LEFT (MIN 3 VIEWS)    Result Date: 10/18/2022  EXAMINATION: XRAY VIEWS OF THE LEFT WRIST 10/18/2022 6:59 am COMPARISON: None. HISTORY: ORDERING SYSTEM PROVIDED HISTORY: fall TECHNOLOGIST PROVIDED HISTORY: Reason for exam:->fall What reading provider will be dictating this exam?->CRC FINDINGS: Presence of a generalized osteopenia in moderate degree. There are normal cortical bone contour for all bone structures of the left wrist.  The radiocarpal, intercarpal and carpal-metacarpal and metacarpal-phalangeal joints are preserved. Minimal degenerative changes are seen in the 1st carpal-metacarpal joint 1st metacarpal-phalangeal joint. There are preserved alignment for the carpal bones including in the lateral view. No significant degree of soft tissue swelling is seen in the area of the wrist.  Vascular arterial calcifications are seen in the region of the radial artery with ectasia and tortuosity.      No acute fractures or dislocations in the left wrist.     XR KNEE LEFT (1-2 VIEWS)    Result Date: 10/18/2022  EXAMINATION: TWO XRAY VIEWS OF THE LEFT KNEE 10/18/2022 6:59 am COMPARISON: None. HISTORY: ORDERING SYSTEM PROVIDED HISTORY: fall TECHNOLOGIST PROVIDED HISTORY: Reason for exam:->fall What reading provider will be dictating this exam?->CRC FINDINGS: Presence of mild osteopenia. Vascular arterial calcifications are seen in the femoropopliteal artery. Presence of a left knee prosthesis. There is proper position for the femoral and tibial components of the prosthesis and also for the patellar component. No acute fracture seen in the distal left femur, proximal left tibia and proximal left fibula. No acute fracture seen in the patella. There are discrete calcifications in the Mely articular spaces more likely relate with the placement of the prosthesis. There is a relative hypertrophy for the fabella. Increased densities seen in the suprapatellar region and in the deep aspect of infrapatellar fat pad represent more likely component of joint effusion. There is some thickening of the patellar tendon. Presence of left knee prosthesis. The prosthesis is in proper position. No acute fractures or dislocations in the left knee. Mild left knee joint effusion. CT HEAD WO CONTRAST    Result Date: 10/18/2022  EXAMINATION: CT OF THE HEAD WITHOUT CONTRAST  10/18/2022 6:51 am TECHNIQUE: CT of the head was performed without the administration of intravenous contrast. Automated exposure control, iterative reconstruction, and/or weight based adjustment of the mA/kV was utilized to reduce the radiation dose to as low as reasonably achievable. COMPARISON: None. HISTORY: ORDERING SYSTEM PROVIDED HISTORY: fall left side hematvicenta TECHNOLOGIST PROVIDED HISTORY: Reason for exam:->fall left side evaima Has a \"code stroke\" or \"stroke alert\" been called? ->No Decision Support Exception - unselect if not a suspected or confirmed emergency medical condition->Emergency Medical Condition (MA) What negative for a pneumothorax. 1. There is no acute compression fracture or subluxation of the cervical spine. 2. Advanced multilevel degenerative disc and degenerative joint disease. XR CHEST PORTABLE    Result Date: 11/12/2022  EXAMINATION: ONE XRAY VIEW OF THE CHEST 11/12/2022 2:13 pm COMPARISON: None. HISTORY: ORDERING SYSTEM PROVIDED HISTORY: chest pain TECHNOLOGIST PROVIDED HISTORY: Reason for exam:->chest pain What reading provider will be dictating this exam?->CRC FINDINGS: The heart is enlarged. Postop sternotomy changes are noted. There is a dialysis catheter on the left. The lungs are clear. There is no focal infiltrate or effusion. Cardiomegaly. There are no findings of failure or pneumonia. Stable position of the left dialysis catheter. Active Hospital Problems    Diagnosis Date Noted    Chest pain [R07.9]      Priority: High         Impression/Plan:   CP - does not appear to be ACS. She has CAD. Would suggest continued home CV meds. No plans for invasive testing at this time  CABG and TV Repair. Mild B/L Carotid Stenosis  HPL Statin  ESRD- routine HD  OK for dc and out pt eval from CV point. Discussed w attending. Thank you for allowing us to participate in the care of this patient. Will continue to follow. Please call if questions or concerns arise.     Electronically signed by Rachel Ramirez MD on 11/13/2022 at 10:39 AM

## 2022-11-14 LAB
EKG ATRIAL RATE: 82 BPM
EKG P AXIS: 75 DEGREES
EKG P-R INTERVAL: 242 MS
EKG Q-T INTERVAL: 428 MS
EKG QRS DURATION: 124 MS
EKG QTC CALCULATION (BAZETT): 500 MS
EKG R AXIS: -53 DEGREES
EKG T AXIS: 59 DEGREES
EKG VENTRICULAR RATE: 82 BPM

## 2022-11-15 RX ORDER — NITROGLYCERIN 0.4 MG/1
TABLET SUBLINGUAL
Qty: 25 TABLET | Refills: 3 | Status: SHIPPED | OUTPATIENT
Start: 2022-11-15

## 2022-11-15 NOTE — TELEPHONE ENCOUNTER
Apolonia Alcazar from [facility] calling to let Dr Donis Purvis know that pt has been in the ER multiple times for chest pain. States that pt was taking  nitroglycerin. Refill pending.        Apolonia Alcazar # [phone]

## 2022-11-15 NOTE — TELEPHONE ENCOUNTER
Requesting medication refill. Please approve or deny this request.    Rx requested:  Requested Prescriptions     Pending Prescriptions Disp Refills    nitroGLYCERIN (NITROSTAT) 0.4 MG SL tablet 25 tablet 3     Sig: up to max of 3 total doses. If no relief after 1 dose, call 911.          Last Office Visit:   9/28/2022      Next Visit Date:  Future Appointments   Date Time Provider Aminata Quinterosi   11/16/2022  3:00 PM Andrei Rosario  [address]   12/14/2022  3:30 PM Lili Fox MD [address] [place] [address]

## 2022-11-16 ENCOUNTER — OFFICE VISIT (OUTPATIENT)
Dept: ENDOCRINOLOGY | Age: 62
End: 2022-11-16
Payer: MEDICARE

## 2022-11-16 VITALS
HEART RATE: 86 BPM | OXYGEN SATURATION: 94 % | DIASTOLIC BLOOD PRESSURE: 68 MMHG | HEIGHT: 67 IN | SYSTOLIC BLOOD PRESSURE: 107 MMHG | BODY MASS INDEX: 28.98 KG/M2

## 2022-11-16 DIAGNOSIS — E11.65 UNCONTROLLED TYPE 2 DIABETES MELLITUS WITH HYPERGLYCEMIA (HCC): Primary | ICD-10-CM

## 2022-11-16 LAB
CHP ED QC CHECK: NORMAL
GLUCOSE BLD-MCNC: 298 MG/DL
HBA1C MFR BLD: 7.5 %

## 2022-11-16 PROCEDURE — 3017F COLORECTAL CA SCREEN DOC REV: CPT | Performed by: INTERNAL MEDICINE

## 2022-11-16 PROCEDURE — 3078F DIAST BP <80 MM HG: CPT | Performed by: INTERNAL MEDICINE

## 2022-11-16 PROCEDURE — 1036F TOBACCO NON-USER: CPT | Performed by: INTERNAL MEDICINE

## 2022-11-16 PROCEDURE — 2022F DILAT RTA XM EVC RTNOPTHY: CPT | Performed by: INTERNAL MEDICINE

## 2022-11-16 PROCEDURE — G8417 CALC BMI ABV UP PARAM F/U: HCPCS | Performed by: INTERNAL MEDICINE

## 2022-11-16 PROCEDURE — G8484 FLU IMMUNIZE NO ADMIN: HCPCS | Performed by: INTERNAL MEDICINE

## 2022-11-16 PROCEDURE — 3051F HG A1C>EQUAL 7.0%<8.0%: CPT | Performed by: INTERNAL MEDICINE

## 2022-11-16 PROCEDURE — 3074F SYST BP LT 130 MM HG: CPT | Performed by: INTERNAL MEDICINE

## 2022-11-16 PROCEDURE — 83036 HEMOGLOBIN GLYCOSYLATED A1C: CPT | Performed by: INTERNAL MEDICINE

## 2022-11-16 PROCEDURE — 99213 OFFICE O/P EST LOW 20 MIN: CPT | Performed by: INTERNAL MEDICINE

## 2022-11-16 PROCEDURE — 82962 GLUCOSE BLOOD TEST: CPT | Performed by: INTERNAL MEDICINE

## 2022-11-16 PROCEDURE — G8427 DOCREV CUR MEDS BY ELIG CLIN: HCPCS | Performed by: INTERNAL MEDICINE

## 2022-11-16 NOTE — PROGRESS NOTES
11/16/2022    Assessment:       Diagnosis Orders   1. Uncontrolled type 2 diabetes mellitus with hyperglycemia (HCC)  POCT Glucose    POCT glycosylated hemoglobin (Hb A1C)            PLAN:     Orders Placed This Encounter   Procedures    Hemoglobin A1C     Standing Status:   Future     Standing Expiration Date:   36/91/0925    Basic Metabolic Panel, Fasting     Standing Status:   Future     Standing Expiration Date:   11/16/2023    POCT Glucose    POCT glycosylated hemoglobin (Hb A1C)     Continue Lantus 55 units at bedtime plus NovoLog 12 units with each meals A1c goal of 7 or lower patient to follow-up in 3 months time      Orders Placed This Encounter   Procedures    POCT Glucose    POCT glycosylated hemoglobin (Hb A1C)     No orders of the defined types were placed in this encounter. No follow-ups on file. Subjective:     Chief Complaint   Patient presents with    Diabetes     Vitals:    11/16/22 1443   BP: 107/68   Pulse: 86   SpO2: 94%   Height: 5' 7\" (1.702 m)     Wt Readings from Last 3 Encounters:   11/12/22 185 lb (83.9 kg)   10/18/22 184 lb (83.5 kg)   10/06/22 190 lb (86.2 kg)     BP Readings from Last 3 Encounters:   11/16/22 107/68   11/13/22 (!) 99/42   10/18/22 135/72     Follow-up on type 2 diabetes history of coronary artery disease variable compliance A1c is gone up to 7.5 patient was recently admitted in the hospital for chest pain  Patient developed chest pain while she was on dialysis    Diabetes  She presents for her follow-up diabetic visit. She has type 2 diabetes mellitus. Her disease course has been worsening. Associated symptoms include fatigue. Pertinent negatives for diabetes include no polydipsia, no polyuria and no weight loss. Diabetic complications include heart disease. Current diabetic treatment includes insulin injections. She is currently taking insulin pre-breakfast, pre-lunch, pre-dinner and at bedtime. She never participates in exercise.  Her overall blood glucose range is 180-200 mg/dl. (Hemoglobin A1C       Date                     Value               Ref Range           Status                11/16/2022               7.5                 %                   Final            ----------  )   Past Medical History:   Diagnosis Date    Angina at rest [facility]) 5/24/2020    Anxiety     CAD S/P percutaneous coronary angioplasty 2015, 2018    stents per dr Betsey Andino    CHF (congestive heart failure) ([facility]     CKD (chronic kidney disease) stage 4, GFR 15-29 ml/min ([facility] [facility] 2/24/2018    CKD stage 4 due to type 2 diabetes mellitus ([facility] [facility]     Contusion of right chest wall 2/16/2021    COPD (chronic obstructive pulmonary disease) ([facility] [facility]     Diabetic nephropathy with proteinuria ([facility] [facility] 2014    DJD (degenerative joint disease) of knee     Dr Milad Gomez    GERD (gastroesophageal reflux disease)     Hemiparesis, left ([facility] 2013    entered Assisted Living ([facility])    Hemodialysis patient [facility])     Hemodialysis-associated hypotension 10/22/2021    History of heart failure     History of seizures     History of type C viral hepatitis     HTN (hypertension)     Hyperlipidemia     Impaired mobility and activities of daily living     Infection of venous access port 7/29/2022    Mediastinal lymphadenopathy 2013    Dr Larene Peabody    Metabolic syndrome     Moderate persistent asthma without complication 3/10/4761    Need for extended care facility 7/7/2021    Neurogenic urinary incontinence 2013    Neuropathy in diabetes ([facility]     Nonrheumatic mitral valve regurgitation 7/7/2021    Nonrheumatic tricuspid valve regurgitation 7/7/2021    Obesity (BMI 30-39. 9)     Recurrent UTI     S/P colonoscopy 2014    [facility], focal active colitis    Schizophrenia, paranoid, chronic ([facility] [facility]     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 Mike Lowery      x1 Dr. Katerina Key, Dr Galaviz Riff  08/10/2021    [facility]    DIAGNOSTIC CARDIAC CATH LAB PROCEDURE  10/02/2019    DIALYSIS CATHETER INSERTION Left 2022    Tunneled Symetrex 15.5F x 23cm hemodialysis catheter inserted by Dr. Glenna Cedeño Left 2022    [id number] replamcent tunneld HD cath by Dr. Anila Malagon, TOTAL ABDOMINAL (CERVIX REMOVED)      one ovary intact, Dr Krzysztof Parekh, menorrhagia    IR THROMBECTOMY PERCUT AVF  2022    IR THROMBECTOMY PERCUT AVF 2022 [facility] PROCED[facility]    IR TUNNELED CATHETER PLACEMENT GREATER THAN 5 YEARS  2022    IR TUNNELED CATHETER PLACEMENT GREATER THAN 5 YEARS 6/3/2022 [facility]ED[facility]    IR TUNNELED CATHETER PLACEMENT GREATER THAN 5 YEARS Left 2022 Dr. Maribell Enriquez exchanged to 15.5F x 28 cm.      15.5 fr by 23 cm Symetrex dialysis catheter inserted by Dr Maribell Enriquez    IR TUNNELED [address] 5 YEARS  2022    IR TUNNELED CATHETER PLACEMENT GREATER THAN 5 YEARS 2022 [facility] PROCEDURE    FL TOTAL KNEE ARTHROPLASTY Left 2018    LEFT KNEE TOTAL KNEE ARTHROPLASTY, SHAYNA, NERVE BLOCK [facility] [address] [facility]     TOTAL KNEE ARTHROPLASTY  2016    Dr Mirela Kenyon    TUNNELED [address] Right 2020    tunneled HD catheter per Dr Amanda Mota History    Marital status:  all   Food Insecurity: No Food Insecurity    Worried About Running Out of Food in the Last Year: Never true    Ran Out of Food in the Last Year: Never true   Transportation Needs: No Transportation Needs    Lack of Transportation (Medical): No    Lack of Transportation (Non-Medical): No   Physical Activity: Sufficiently Active    Days of Exercise per Week: 3 days    Minutes of Exercise per Session: 60 min   Stress: Not on file   Social Connections: Not on file   Intimate Partner Violence: Not on file   Housing Stability: Not on file     Family History   Problem Relation Age of Onset    Cancer Mother 76        survived    Breast Cancer Mother     Hypertension Father     Diabetes Sister     Mental Illness Sister     Colon Cancer Neg Hx      Allergies   Allergen Reactions    Codeine Hives     hives    Oxycontin [Oxycodone Hcl] Hives       Current Outpatient Medications:     nitroGLYCERIN (NITROSTAT) 0.4 MG SL tablet, up to max of 3 total doses. If no relief after 1 dose, call 911., Disp: 25 tablet, Rfl: 3    metoprolol tartrate (LOPRESSOR) 25 MG tablet, Take 1 tablet by mouth 2 times daily, Disp: 60 tablet, Rfl: 3    hydrOXYzine HCl (ATARAX) 25 MG tablet, TAKE 1 TABLET BY MOUTH TWICE DAILY, Disp: , Rfl:     LOPERAMIDE HCL PO, Take 4 mg by mouth, Disp: , Rfl:     diclofenac sodium (VOLTAREN) 1 % GEL, APPLY TO LOWER EXTREMETIES. APPLY 4 GRAMS OF GEL TO AFFECTED AREA 4 TIMES DAILY.  DO NOT APPLY MORE THAN 16 GRAMS DAILY TO ANY ONE AFFECTED J, Disp: , Rfl:     SURE COMFORT PEN NEEDLES 30G X 8 MM MISC, USE AS DIRECTED FIVE TIMES A DAILY, Disp: 100 each, Rfl: 10    LANTUS SOLOSTAR 100 UNIT/ML injection pen, INJECT 55 UNITS SUBCUTANEOUSLY AT BEDTIME, Disp: 15 mL, Rfl: 10    isosorbide mononitrate (IMDUR) 30 MG extended release tablet, TAKE FOUR (4) TABLETS BY MOUTH DAILY, Disp: 120 tablet, Rfl: 10    ARIPiprazole (ABILIFY) 5 MG tablet, TAKE 1 TABLET BY MOUTH ONCE DAILY, Disp: 30 tablet, Rfl: 10    insulin aspart (NOVOLOG FLEXPEN) 100 UNIT/ML injection pen, INJECT 12 UNITS WITH EACH MEAL., Disp: 30 mL, Rfl: 3    B Complex-C-Folic Acid (MARK-KANDACE) TABS, Take 1 tablet by mouth in the morning., Disp: , Rfl:     ondansetron (ZOFRAN) 4 MG tablet, Take 4 mg by mouth every 8 hours as needed for Nausea or Vomiting, Disp: , Rfl:     sertraline (ZOLOFT) 50 MG tablet, Take 1 tablet by mouth nightly, Disp: 30 tablet, Rfl: 5    trimethobenzamide (TIGAN) 100 MG/ML injection, Inject 2 mLs into the muscle every 6 hours as needed for Nausea, Disp: 100 mL, Rfl: 0    Vitamin D (CHOLECALCIFEROL) 50 MCG (2000 UT) TABS tablet, Take 1 tablet by mouth Daily with supper, Disp: 60 tablet, Rfl: 5    atorvastatin (LIPITOR) 40 MG tablet, Take 1 tablet by mouth nightly, Disp: 30 tablet, Rfl: 5    NYAMYC 813913 UNIT/GM powder, APPLY TO AFFECTED AREA OF ABDOMINAL FOLDS EVERY 12 HOURS AS NEEDED, Disp: 60 g, Rfl: 5    magnesium oxide (MAG-OX) 400 MG tablet, TAKE 1 TABLET BY MOUTH DAILY, Disp: 30 tablet, Rfl: 12    melatonin 10 MG CAPS capsule, Take 1 capsule by mouth nightly, Disp: 30 capsule, Rfl: 12    midodrine (PROAMATINE) 5 MG tablet, Take 1 tablet by mouth one time a day every Tue, Thu, Sat for hypotension. Give 30 mins prior to dialysis. , Disp: 90 tablet, Rfl: 3    Handicap Placard MISC, by Does not apply route Expiration in 5 years. , Disp: 1 each, Rfl: 0    lidocaine-prilocaine (EMLA) 2.5-2.5 % cream, Apply topically as needed for Pain Apply topically as needed.  3 times a week before dialysis wrap with saran wrap, Disp: , Rfl:     albuterol (PROVENTIL) (2.5 MG/3ML) 0.083% nebulizer solution, Take 3 mLs by nebulization every 4 hours as needed for Wheezing, Disp: 120 each, Rfl: 3    blood glucose test strips (ONETOUCH VERIO) strip, QID, Disp: 300 each, Rfl: 3    OneTouch Delica Lancets 49O MISC, QID, Disp: 300 each, Rfl: 3    Blood Glucose Monitoring Suppl (ONETOUCH VERIO) w/Device KIT, AS DIRECTED, Disp: 1 kit, Rfl: 0    aspirin EC 81 MG EC tablet, Take 1 tablet by mouth daily, Disp: 30 tablet, Rfl: 12    blood glucose test strips (FREESTYLE LITE) strip, 1 each by Does not apply route 4 times daily (before meals and nightly) As needed. , Disp: 200 strip, Rfl: 5    Alcohol Swabs (EASY TOUCH ALCOHOL PREP MEDIUM) 70 % PADS, USE AS DIRECTED THREE TIMES A DAY, Disp: 100 each, Rfl: 3    FreeStyle Lancets MISC, Test 4x daily, Disp: 150 each, Rfl: 3    acetaminophen (TYLENOL) 325 MG tablet, Take 2 tablets by mouth every 4 hours as needed for Pain (For mild to moderate pain (Pain 1-6 out of 10 on pain scale)), Disp: 120 tablet, Rfl: 0    Blood Glucose Monitoring Suppl (FREESTYLE LITE) [place], 1 Device by Does not apply route daily as needed (Diabetes) Use freestyle meter to test blood sugar as needed, Disp: 1 Device, Rfl: 0  Lab Results   Component Value Date     (L) 11/12/2022    K 4.7 11/12/2022    CL 92 (L) 11/12/2022    CO2 29 11/12/2022    BUN 43 (H) 11/12/2022    CREATININE 4.1 (H) 11/12/2022    GLUCOSE 298 11/16/2022    CALCIUM 10.3 (H) 11/12/2022    PROT 7.7 11/12/2022    LABALBU 3.6 11/12/2022    BILITOT 0.7 11/12/2022    ALKPHOS 129 11/12/2022    AST 14 11/12/2022    ALT 9 11/12/2022    LABGLOM 12 (A) 11/12/2022    GFRAA 17.5 (L) 10/06/2022    GLOB 4.1 (H) 11/12/2022     Lab Results   Component Value Date    WBC 10.7 11/12/2022    HGB 11.9 (L) 11/12/2022    HCT 30.7 (L) 11/12/2022    MCV 84.0 11/12/2022     11/12/2022     Lab Results   Component Value Date    LABA1C 7.5 11/16/2022    LABA1C 6.0 (H) 07/14/2022    LABA1C 6.7 04/13/2022     Lab Results   Component Value Date    HDL 33 (L) 04/09/2022    HDL 39 (L) 01/13/2022    HDL 48 06/11/2020    LDLCALC 19 04/09/2022    LDLCALC 38 01/13/2022    LDLCALC 37 06/11/2020    CHOL 70 04/09/2022    CHOL 123 01/13/2022    CHOL 101 06/11/2020    TRIG 92 04/09/2022    TRIG 228 (H) 01/13/2022    TRIG 82 06/11/2020     No results found for: TESTM  Lab Results   Component Value Date    TSH 0.513 06/30/2022    TSH 0.856 07/16/2021    TSH 0.454 06/28/2020    TSHREFLEX 1.300 08/27/2019     No results found for: TPOABS    Review of Systems   Constitutional:  Positive for fatigue. Negative for weight loss. Eyes: Negative. Cardiovascular: Negative. Endocrine: Negative. Negative for polydipsia and polyuria. Psychiatric/Behavioral:  Positive for dysphoric mood. All other systems reviewed and are negative. Objective:   Physical Exam  Vitals reviewed. Constitutional:       General: She is not in acute distress. Appearance: Normal appearance. HENT:      Head: Normocephalic and atraumatic. Right Ear: External ear normal.      Left Ear: External ear normal.      Nose: Nose normal.   Eyes:      General: No scleral icterus. Right eye: No discharge. Left eye: No discharge. Extraocular Movements: Extraocular movements intact. Conjunctiva/sclera: Conjunctivae normal.   Cardiovascular:      Rate and Rhythm: Normal rate. Pulmonary:      Effort: Pulmonary effort is normal.   Musculoskeletal:         General: Normal range of motion. Cervical back: Normal range of motion and neck supple. Neurological:      General: No focal deficit present. Mental Status: She is alert and oriented to person, place, and time.    Psychiatric:         Mood and Affect: Mood normal.         Behavior: Behavior normal.

## 2022-11-16 NOTE — LETTER
[address]  Phone: [phone]  Fax: [phone]    Rinku Correa MD        November 16, 2022     Patient: Monica Clements   YOB: 1960   Date of Visit: 11/16/2022       To Whom It May Concern: It is my medical opinion that Joy Mino is cleared for surgery through Endocrine but has a moderate to severe risk. If you have any questions or concerns, please don't hesitate to call.     Sincerely,        Rinku Correa MD

## 2022-11-17 LAB
BLOOD CULTURE, ROUTINE: NORMAL
BLOOD CULTURE, ROUTINE: NORMAL

## 2022-11-23 ASSESSMENT — ENCOUNTER SYMPTOMS: EYES NEGATIVE: 1

## 2022-12-02 ENCOUNTER — CLINICAL DOCUMENTATION ONLY (OUTPATIENT)
Facility: CLINIC | Age: 62
End: 2022-12-02

## 2022-12-14 ENCOUNTER — OFFICE VISIT (OUTPATIENT)
Dept: CARDIOLOGY CLINIC | Age: 62
End: 2022-12-14
Payer: MEDICARE

## 2022-12-14 VITALS — HEART RATE: 78 BPM | DIASTOLIC BLOOD PRESSURE: 56 MMHG | OXYGEN SATURATION: 100 % | SYSTOLIC BLOOD PRESSURE: 98 MMHG

## 2022-12-14 DIAGNOSIS — E78.2 MIXED HYPERLIPIDEMIA: ICD-10-CM

## 2022-12-14 DIAGNOSIS — R26.2 DIFFICULTY IN WALKING: ICD-10-CM

## 2022-12-14 DIAGNOSIS — I95.3 HEMODIALYSIS-ASSOCIATED HYPOTENSION: ICD-10-CM

## 2022-12-14 DIAGNOSIS — Z01.818 PRE-OPERATIVE CLEARANCE: Primary | ICD-10-CM

## 2022-12-14 DIAGNOSIS — Z95.1 S/P SINGLE VESSEL CORONARY ARTERY BYPASS: ICD-10-CM

## 2022-12-14 DIAGNOSIS — I25.119 CORONARY ARTERY DISEASE INVOLVING NATIVE HEART WITH ANGINA PECTORIS, UNSPECIFIED VESSEL OR LESION TYPE (HCC): ICD-10-CM

## 2022-12-14 DIAGNOSIS — I10 ESSENTIAL (PRIMARY) HYPERTENSION: ICD-10-CM

## 2022-12-14 DIAGNOSIS — I10 ESSENTIAL HYPERTENSION: ICD-10-CM

## 2022-12-14 DIAGNOSIS — I10 HYPERTENSION, UNSPECIFIED TYPE: ICD-10-CM

## 2022-12-14 PROCEDURE — 3017F COLORECTAL CA SCREEN DOC REV: CPT | Performed by: INTERNAL MEDICINE

## 2022-12-14 PROCEDURE — 3078F DIAST BP <80 MM HG: CPT | Performed by: INTERNAL MEDICINE

## 2022-12-14 PROCEDURE — 99214 OFFICE O/P EST MOD 30 MIN: CPT | Performed by: INTERNAL MEDICINE

## 2022-12-14 PROCEDURE — 1036F TOBACCO NON-USER: CPT | Performed by: INTERNAL MEDICINE

## 2022-12-14 PROCEDURE — G8417 CALC BMI ABV UP PARAM F/U: HCPCS | Performed by: INTERNAL MEDICINE

## 2022-12-14 PROCEDURE — G8484 FLU IMMUNIZE NO ADMIN: HCPCS | Performed by: INTERNAL MEDICINE

## 2022-12-14 PROCEDURE — G8427 DOCREV CUR MEDS BY ELIG CLIN: HCPCS | Performed by: INTERNAL MEDICINE

## 2022-12-14 PROCEDURE — 3074F SYST BP LT 130 MM HG: CPT | Performed by: INTERNAL MEDICINE

## 2022-12-14 RX ORDER — ONDANSETRON 4 MG/1
TABLET, ORALLY DISINTEGRATING ORAL
COMMUNITY
Start: 2022-11-18

## 2022-12-14 ASSESSMENT — ENCOUNTER SYMPTOMS
NAUSEA: 0
WHEEZING: 0
EYES NEGATIVE: 1
SHORTNESS OF BREATH: 0
COUGH: 0
GASTROINTESTINAL NEGATIVE: 1
STRIDOR: 0
RESPIRATORY NEGATIVE: 1
BLOOD IN STOOL: 0
CHEST TIGHTNESS: 0

## 2022-12-14 NOTE — PROGRESS NOTES
Subsequent Progress Note  Patient: Chris Dickinson  YOB: 1960  MRN: [id number]    Chief Complaint: fall CAD HTN HPL  Chief Complaint   Patient presents with    3 Month Follow-Up     Was in the hospital on 11/12-11/13 for chest pain    Hypertension    Cardiac Clearance     12/22/2022: total L. Knee replacement at [facility] w/ Dr. Mariluz Sneed        CV Data:  6/2020 Echo EF 60  Mild mR  RVSP 46   7/2020 LAD DEWEY. She has prior stents as well.   8/21 Echo EF 60  8/2021 LAD recurrent ISR with double layer stents. Went to [facility] and had redo stent. 12/21 CUS B/l ICA 50-69  12/21 Spec tnegative  1/12/22 Cath LAD IST   1/2022 CABG LIMA - LAD + TV Repair complicated by Tamponade and pericardial window. 4/25/22 eCHO ef 60 NO pe rvsp 68  7/22 NOELLE normal LVEF no PFO no THrombus    HD T Th Sat  Subjective/HPI: TELEHEALTH EVALUATION -- Audio/Visual (During ZJMRI-75 public health emergency)    Pt is at a nursing home now. No cp occ SOB no falls no bleed. Takes meds. Recently Plavix stopped and Brilinta added. Had recerrnet admissions for CP    10/28/2020 started HD( T Thur and Sat). Past 3 days gaine ~ 10 LBS according to her scale at home and HD. She is not short of breath and denies CP. She is here due to discrepancy in weight. 2/10/21 TELEHEALTH EVALUATION -- Audio/Visual (During RHTYX-52 public health emergency)    At [facility]. No further falls no cp some sob eats well takes meds. Will go home net week. Doing well w HD    9/10/21 after LAD stent at [facility] feels better no cp no sob no falls no bleed. 10/27/21 recent er for CP and fall. BP was low. Some parikh. No bleed. Takes meds. Often dizzy    3/24/22 had CABG + TV repairand Pericaridal window. Ws in hosp > 2 week. s weak and tired. starign to move some. 4/25/22 still with sternal discomfort with motion. No angina. No sob no falls no bleed. 8/16/22 recent [facility] for sepsis and HD catheter infection. Removed and received new one.   Having alyx noncardiac CP no sob able to walk some    9/28/22 doing well no cp no sob minimal walks on HD. Will get AV fistula at [facility]    12/5/22 preop Left Knee. Pt has not wlaked in 1 month due to knee pain. No cp no sob no falls no bleed    EKG: SR 82 RBBB    LIFE LONG Nonsmoker  Lives with daughter      Past Medical History:   Diagnosis Date    Angina at rest [facility]) 5/24/2020    Anxiety     CAD S/P percutaneous coronary angioplasty 2015, 2018    stents per dr Hetal Queen    CHF (congestive heart failure) ([address]     CKD (chronic kidney disease) stage 4, GFR 15-29 ml/min ([address] 2/24/2018    CKD stage 4 due to type 2 diabetes mellitus (HCC)     Contusion of right chest wall 2/16/2021    COPD (chronic obstructive pulmonary disease) ([address]     Diabetic nephropathy with proteinuria ([address] 2014    DJD (degenerative joint disease) of knee     Dr Toby Finn    GERD (gastroesophageal reflux disease)     Hemiparesis, left ([address] 2013    entered Assisted Living ([facility])    Hemodialysis patient [facility])     Hemodialysis-associated hypotension 10/22/2021    History of heart failure     History of seizures     History of type C viral hepatitis     HTN (hypertension)     Hyperlipidemia     Impaired mobility and activities of daily living     Infection of venous access port 7/29/2022    Mediastinal lymphadenopathy 2013    Dr Kendall Flores    Metabolic syndrome     Moderate persistent asthma without complication 3/97/0256    Need for extended care facility 7/7/2021    Neurogenic urinary incontinence 2013    Neuropathy in diabetes ([address]     Nonrheumatic mitral valve regurgitation 7/7/2021    Nonrheumatic tricuspid valve regurgitation 7/7/2021    Obesity (BMI 30-39. 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 Marques Martinez      x1 Dr. Katherin Michaels, Dr Tez Bell  08/10/2021    [facility]    DIAGNOSTIC CARDIAC CATH LAB PROCEDURE  10/02/2019    DIALYSIS CATHETER INSERTION Left 2022    Tunneled Symetrex 15.5F x 23cm hemodialysis catheter inserted by Dr. Nabila Fuentes Left 2022    [id number] replamcent tunneld HD cath by Dr. Jaclyn Sim, TOTAL ABDOMINAL (CERVIX REMOVED)      one ovary intact, Dr Candice Kenny, menorrhagia    IR THROMBECTOMY PERCUT AVF  2022    IR THROMBECTOMY PERCUT AVF 2022 [facility] SPECIAL PROCEDURE    IR TUNNELED CATHETER PLACEMENT GREATER THAN 5 YEARS  2022    IR TUNNELED CATHETER PLACEMENT GREATER THAN 5 YEARS 6/3/2022 [facility] PROCEDURE    IR TUNNELED CATHETER PLACEMENT GREATER THAN 5 YEARS Left 2022 Dr. Phi Fernandes exchanged to 15.5F x 28 cm.      15.5 fr by 23 cm Symetrex dialysis catheter inserted by Dr Phi Fernandes    IR TUNNELED Wash Mechelle 5 YEARS  2022    IR TUNNELED CATHETER PLACEMENT GREATER THAN 5 YEARS 2022 [facility] SPECIAL PROCEDURE    SD TOTAL KNEE ARTHROPLASTY Left 2018    LEFT KNEE TOTAL KNEE ARTHROPLASTY, SHAYNA, NERVE BLOCK [facility]     TOTAL KNEE ARTHROPLASTY  2016    Dr Ron Ackerman    TUNNELED [address] Right 2020    tunneled HD catheter per  Adamaris Galindo       Family History   Problem Relation Age of Onset    Cancer Mother 76        survived    Breast Cancer Mother     Hypertension Father     Diabetes Sister     Mental Illness Sister     Colon Cancer Neg Hx        Social History     Socioeconomic History    Marital status:      Spouse name: None    Number of children: 2    Years of education: None    Highest education level: None   Occupational History    Occupation: disabled   Tobacco Use    Smoking status: Never    Smokeless tobacco: Never   Vaping Use    Vaping Use: Never used   Substance and Sexual Activity    Alcohol use: No     Alcohol/week: 0.0 standard drinks    Drug use: No    Sexual activity: Not Currently   Social History Narrative    Disabled dt depression and low back pain, lives in [facility] Maximiliano Carrera is close by and is her paid caregiver via waiver services    HD via [employer], [id number], [facility], Sat. Son is in the home and has CP and is total care-and is also cared for by a waiver by Alesia Kirkpatrick. Pt was born in [place], one of [facility] a twin sister Shabana Leon, very ill in 2018, [place] [id number]    Moved to [facility], , 2 children, one son (disabled with CP) and one daughter Lilly Bryan at [facility], as a nurse's aide Disabled due to mental illness and back pain    Lived at [facility], was discharged, returned to independent living in 2017 in the daughter's house and has adjusted well    One son and one daughter, live in the same house with patient, Steve Hayward pays the rent    [employer] reading ([employer])             10/11/2021 [facility] updates; patient lives with her daughter son-in-law and 2 grandchildren and patient's handicapped son. Per daughter, her brother is blind, MRDD, CP multiple health issues. Daughter's  is patient's legal guardian. Patient has hemodialysis Tuesday Thursday and Saturday. Patient's bedroom is on main floor with a half bath.   Daughter walks patient upstairs once weekly for full bath. Patient is using her walker in the home. Patient has a hospital bed in the home. Social Determinants of Health     Financial Resource Strain: Low Risk     Difficulty of Paying Living Expenses: Not hard at all   Food Insecurity: No Food Insecurity    Worried About [address] in the Last Year: Never true    [facility] in the Last Year: Never true   Transportation Needs: No Transportation Needs    Lack of Transportation (Medical): No    Lack of Transportation (Non-Medical): No   Physical Activity: Sufficiently Active    Days of Exercise per Week: 3 days    Minutes of Exercise per Session: 60 min       Allergies   Allergen Reactions    Codeine Hives     hives    Oxycontin [Oxycodone Hcl] Hives       Current Outpatient Medications   Medication Sig Dispense Refill    ondansetron (ZOFRAN-ODT) 4 MG disintegrating tablet DISSOLVE ONE TABLET IN MOUTH EVERY 8 HOURS AS NEEDED      nitroGLYCERIN (NITROSTAT) 0.4 MG SL tablet up to max of 3 total doses. If no relief after 1 dose, call 911. 25 tablet 3    metoprolol tartrate (LOPRESSOR) 25 MG tablet Take 1 tablet by mouth 2 times daily 60 tablet 3    hydrOXYzine HCl (ATARAX) 25 MG tablet TAKE 1 TABLET BY MOUTH TWICE DAILY      LOPERAMIDE HCL PO Take 4 mg by mouth      diclofenac sodium (VOLTAREN) 1 % GEL APPLY TO LOWER EXTREMETIES. APPLY 4 GRAMS OF GEL TO AFFECTED AREA 4 TIMES DAILY. DO NOT APPLY MORE THAN 16 GRAMS DAILY TO ANY ONE AFFECTED J      SURE COMFORT PEN NEEDLES 30G X 8 MM MISC USE AS DIRECTED FIVE TIMES A DAILY 100 each 10    LANTUS SOLOSTAR 100 UNIT/ML injection pen INJECT 55 UNITS SUBCUTANEOUSLY AT BEDTIME 15 mL 10    isosorbide mononitrate (IMDUR) 30 MG extended release tablet TAKE FOUR (4) TABLETS BY MOUTH DAILY 120 tablet 10    ARIPiprazole (ABILIFY) 5 MG tablet TAKE 1 TABLET BY MOUTH ONCE DAILY 30 tablet 10    insulin aspart (NOVOLOG FLEXPEN) 100 UNIT/ML injection pen INJECT 12 UNITS WITH EACH MEAL.  30 mL 3    B Complex-C-Folic Acid (MARK-KANDACE) TABS Take 1 tablet by mouth in the morning. ondansetron (ZOFRAN) 4 MG tablet Take 4 mg by mouth every 8 hours as needed for Nausea or Vomiting      sertraline (ZOLOFT) 50 MG tablet Take 1 tablet by mouth nightly 30 tablet 5    trimethobenzamide (TIGAN) 100 MG/ML injection Inject 2 mLs into the muscle every 6 hours as needed for Nausea 100 mL 0    Vitamin D (CHOLECALCIFEROL) 50 MCG (2000 UT) TABS tablet Take 1 tablet by mouth Daily with supper 60 tablet 5    atorvastatin (LIPITOR) 40 MG tablet Take 1 tablet by mouth nightly 30 tablet 5    NYAMYC 014825 UNIT/GM powder APPLY TO AFFECTED AREA OF ABDOMINAL FOLDS EVERY 12 HOURS AS NEEDED 60 g 5    magnesium oxide (MAG-OX) 400 MG tablet TAKE 1 TABLET BY MOUTH DAILY 30 tablet 12    melatonin 10 MG CAPS capsule Take 1 capsule by mouth nightly 30 capsule 12    midodrine (PROAMATINE) 5 MG tablet Take 1 tablet by mouth one time a day every Tue, Thu, Sat for hypotension. Give 30 mins prior to dialysis. 90 tablet 3    Handicap Placard MISC by Does not apply route Expiration in 5 years. 1 each 0    lidocaine-prilocaine (EMLA) 2.5-2.5 % cream Apply topically as needed for Pain Apply topically as needed. 3 times a week before dialysis wrap with saran wrap      albuterol (PROVENTIL) (2.5 MG/3ML) 0.083% nebulizer solution Take 3 mLs by nebulization every 4 hours as needed for Wheezing 120 each 3    blood glucose test strips (ONETOUCH VERIO) strip  each 3    OneTouch Delica Lancets 12Q MISC  each 3    Blood Glucose Monitoring Suppl (ONETOUCH VERIO) w/Device KIT AS DIRECTED 1 kit 0    aspirin EC 81 MG EC tablet Take 1 tablet by mouth daily 30 tablet 12    blood glucose test strips (FREESTYLE LITE) strip 1 each by Does not apply route 4 times daily (before meals and nightly) As needed.  200 strip 5    Alcohol Swabs (EASY TOUCH ALCOHOL PREP MEDIUM) 70 % PADS USE AS DIRECTED THREE TIMES A  each 3    FreeStyle Lancets MISC Test 4x daily 150 each 3    acetaminophen (TYLENOL) 325 MG tablet Take 2 tablets by mouth every 4 hours as needed for Pain (For mild to moderate pain (Pain 1-6 out of 10 on pain scale)) 120 tablet 0    Blood Glucose Monitoring Suppl (FREESTYLE LITE) CORETTA 1 Device by Does not apply route daily as needed (Diabetes) Use freestyle meter to test blood sugar as needed 1 Device 0     No current facility-administered medications for this visit. Review of Systems:   Review of Systems   Constitutional: Negative. Negative for diaphoresis and fatigue. HENT: Negative. Eyes: Negative. Respiratory: Negative. Negative for cough, chest tightness, shortness of breath, wheezing and stridor. Cardiovascular: Negative. Negative for chest pain, palpitations and leg swelling. Gastrointestinal: Negative. Negative for blood in stool and nausea. Genitourinary: Negative. Musculoskeletal: Negative. Skin: Negative. Neurological: Negative. Negative for dizziness, syncope, weakness and light-headedness. Hematological: Negative. Psychiatric/Behavioral: Negative. Physical Examination:    BP (!) 98/56 (Site: Right Upper Arm, Position: Sitting, Cuff Size: Large Adult)   Pulse 78   LMP  (LMP Unknown)   SpO2 100%    Physical Exam   Constitutional: She appears healthy. No distress. HENT:   Normal cephalic and Atraumatic   Eyes: Pupils are equal, round, and reactive to light. Neck: Thyroid normal. No JVD present. No neck adenopathy. No thyromegaly present. Cardiovascular: Normal rate, regular rhythm, intact distal pulses and normal pulses. Murmur heard. Pulmonary/Chest: Effort normal and breath sounds normal. She has no wheezes. She has no rales. She exhibits no tenderness. Abdominal: Soft. Bowel sounds are normal. There is no abdominal tenderness. Musculoskeletal:         General: No tenderness or edema. Normal range of motion. Cervical back: Normal range of motion and neck supple. Neurological: She is alert and oriented to person, place, and time. Skin: Skin is warm. No cyanosis. Nails show no clubbing.      LABS:  CBC:   Lab Results   Component Value Date/Time    WBC 10.7 11/12/2022 01:45 PM    RBC 3.66 11/12/2022 01:45 PM    HGB 11.9 11/12/2022 04:09 PM    HCT 30.7 11/12/2022 01:45 PM    MCV 84.0 11/12/2022 01:45 PM    MCH 28.2 11/12/2022 01:45 PM    MCHC 33.5 11/12/2022 01:45 PM    RDW 17.9 11/12/2022 01:45 PM     11/12/2022 01:45 PM    MPV 10.0 03/05/2020 12:00 AM     Lipids:  Lab Results   Component Value Date    CHOL 70 04/09/2022    CHOL 123 01/13/2022    CHOL 101 06/11/2020     Lab Results   Component Value Date    TRIG 92 04/09/2022    TRIG 228 (H) 01/13/2022    TRIG 82 06/11/2020     Lab Results   Component Value Date    HDL 33 (L) 04/09/2022    HDL 39 (L) 01/13/2022    HDL 48 06/11/2020     Lab Results   Component Value Date    LDLCALC 19 04/09/2022    LDLCALC 38 01/13/2022    LDLCALC 37 06/11/2020     Lab Results   Component Value Date    LABVLDL 59.0 05/04/2013    VLDL 43 01/30/2017     Lab Results   Component Value Date    CHOLHDLRATIO 4.32 01/30/2017     CMP:    Lab Results   Component Value Date/Time     11/12/2022 01:45 PM    K 4.7 11/12/2022 01:45 PM    K 4.3 07/04/2022 03:07 AM    CL 92 11/12/2022 01:45 PM    CO2 29 11/12/2022 01:45 PM    BUN 43 11/12/2022 01:45 PM    CREATININE 4.1 11/12/2022 04:09 PM    CREATININE 4.37 11/12/2022 01:45 PM    GFRAA 17.5 10/06/2022 11:15 PM    LABGLOM 12 11/12/2022 04:09 PM    GLUCOSE 298 11/16/2022 02:38 PM    GLUCOSE 90 10/24/2022 10:43 AM    PROT 7.7 11/12/2022 01:45 PM    LABALBU 3.6 11/12/2022 01:45 PM    CALCIUM 10.3 11/12/2022 01:45 PM    BILITOT 0.7 11/12/2022 01:45 PM    ALKPHOS 129 11/12/2022 01:45 PM    AST 14 11/12/2022 01:45 PM    ALT 9 11/12/2022 01:45 PM     BMP:    Lab Results   Component Value Date/Time     11/12/2022 01:45 PM    K 4.7 11/12/2022 01:45 PM    K 4.3 07/04/2022 03:07 AM    CL 92 11/12/2022 01:45 PM    CO2 29 11/12/2022 01:45 PM    BUN 43 11/12/2022 01:45 PM    LABALBU 3.6 11/12/2022 01:45 PM    CREATININE 4.1 11/12/2022 04:09 PM    CREATININE 4.37 11/12/2022 01:45 PM    CALCIUM 10.3 11/12/2022 01:45 PM    GFRAA 17.5 10/06/2022 11:15 PM    LABGLOM 12 11/12/2022 04:09 PM    GLUCOSE 298 11/16/2022 02:38 PM    GLUCOSE 90 10/24/2022 10:43 AM     Magnesium:    Lab Results   Component Value Date/Time    MG 2.2 08/20/2022 08:45 PM     TSH:  Lab Results   Component Value Date    TSH 0.513 06/30/2022       Patient Active Problem List   Diagnosis    Atherosclerotic heart disease of native coronary artery with unspecified angina pectoris (HCC)    Schizophrenia, paranoid, chronic    Metabolic syndrome    Vitamin B 12 deficiency    Cerebral microvascular disease    Mixed hyperlipidemia    Other hammer toe (acquired)    Vitamin D insufficiency    Incontinence of urine    Diabetic nephropathy with proteinuria (HCC)    Essential (primary) hypertension    History of type C viral hepatitis    Urinary incontinence due to cognitive impairment    History of seizures    Stented coronary artery-plan is to stay on Plavix indefinately per Dr Angeles Schulz    Other specified diabetes mellitus with diabetic neuropathy, unspecified ([facility]    Controlled type 2 diabetes mellitus with diabetic neuropathy, with long-term current use of insulin (HCC)    Hemiparesis, left (HCC)    Angina, class II ([facility])    Pain, unspecified    Tardive dyskinesia    Shortness of breath    Uncontrolled type 2 diabetes mellitus with hyperglycemia (HCC)    Chronic diastolic congestive heart failure (HCC)    Sleep apnea, unspecified    Pulmonary hypertension, unspecified (HCC)    Class 2 severe obesity with serious comorbidity and body mass index (BMI) of 36.0 to 36.9 in [facility])    Edema    Closed supracondylar fracture of right humerus    Other chronic pain    Palliative care patient    Recurrent falls    Renal failure    Difficulty in walking ESRD (end stage renal disease) on dialysis ([address]    Weakness    Other seizures ([facility])    Moderate persistent asthma without complication    [id number]    Post PTCA    Falls frequently    Chest wall contusion, left, initial encounter    Headache, unspecified    Paranoid schizophrenia ([facility])    Compression fracture of spine ([address]    Closed rib fracture    Depression    Chronic obstructive pulmonary disease ([facility])    Critical illness polyneuropathy ([facility]    Multiple closed fractures of ribs of right side    Nonrheumatic mitral valve regurgitation    Nonrheumatic tricuspid valve regurgitation    Need for extended care facility    Chronic pain of both shoulders    Hemodialysis-associated hypotension    Anginal chest pain at rest [facility])    Chest pain    Unstable angina ([facility])    NSTEMI (non-ST elevated myocardial infarction) ([facility])    CKD (chronic kidney disease) stage 4, GFR 15-29 ml/min ([facility])    Hyperkalemia    Impaired mobility and activities of daily living dt polyneuropathy and reccent fall     Dialysis patient [facility])    Unspecified open wound of right upper arm, initial encounter    Multiple closed fractures of right lower extremity and ribs    Closed T11 fracture ([address]    Encephalopathy acute    MRSA bacteremia    Sepsis due to Enterococcus ([address]    Local infection due to central venous catheter    DJD (degenerative joint disease), cervical    Closed head injury    Sarcopenia    Fall from standing    Septicemia ([address]    Chronic hepatitis C ([address]    Catheter-related bloodstream infection    Enterococcus faecalis infection    Cervical stenosis of spinal canal    Ataxic gait    Lumbar stenosis with neurogenic claudication    Falls infrequently    Infection of venous access port       There are no discontinued medications. Modified Medications    No medications on file       No orders of the defined types were placed in this encounter. Assessment/Plan:    1.  Coronary artery disease involving native heart with angina pectoris, unspecified vessel or lesion type ([address]    2. CABG and TV repair with pericaridal window- repeat Echo. Will need Cardiac Rehab but not ready yet. Not ready for cardaic Rehab yet. Echo no further PE. --- stable no angina    2. Diastolic congestive heart failure, unspecified HF chronicity (HCC)  Stable. No edema. - continue meds. Low salt diet    3. Stented coronary artery    4. Shortness of breath   stable - no worse    5. Mixed hyperlipidemia  Statin - long term. Check labs. Low fat diet. 6. Essential hypertension BP low- stop Imdur 120 for now. 7. Coronary artery disease involving native coronary artery of native heart with angina pectoris ([address]    8 dizzy/falls- CUS. - moderate B/L- will continue surveillance. Pt cleared for planned Left knee surgery. Hold ASA 1 week. Counseling:  Heart Healthy Lifestyle, Low Salt Diet, Take Precautions to Prevent Falls and Walk Daily    Return in about 4 months (around 4/14/2023).       Electronically signed by Zach Leon MD on 12/14/2022 at 3:49 PM

## 2022-12-30 DIAGNOSIS — R19.7 DIARRHEA, UNSPECIFIED TYPE: Primary | ICD-10-CM

## 2022-12-30 DIAGNOSIS — S22.089S CLOSED FRACTURE OF ELEVENTH THORACIC VERTEBRA, UNSPECIFIED FRACTURE MORPHOLOGY, SEQUELA: ICD-10-CM

## 2022-12-30 RX ORDER — CHOLECALCIFEROL (VITAMIN D3) 10 MCG
1 TABLET ORAL DAILY
COMMUNITY

## 2022-12-30 RX ORDER — OXYCODONE HYDROCHLORIDE AND ACETAMINOPHEN 5; 325 MG/1; MG/1
1 TABLET ORAL EVERY 6 HOURS PRN
COMMUNITY

## 2022-12-30 RX ORDER — ALBUTEROL SULFATE 2.5 MG/.5ML
2.5 SOLUTION RESPIRATORY (INHALATION) EVERY 4 HOURS PRN
COMMUNITY

## 2022-12-30 RX ORDER — PANTOPRAZOLE SODIUM 20 MG/1
20 TABLET, DELAYED RELEASE ORAL DAILY
COMMUNITY

## 2022-12-30 RX ORDER — DIPHENOXYLATE HYDROCHLORIDE AND ATROPINE SULFATE 2.5; .025 MG/1; MG/1
1 TABLET ORAL EVERY 12 HOURS PRN
COMMUNITY

## 2023-01-03 ENCOUNTER — OFFICE VISIT (OUTPATIENT)
Dept: GERIATRIC MEDICINE | Age: 63
End: 2023-01-03
Payer: MEDICARE

## 2023-01-03 DIAGNOSIS — F20.0 PARANOID SCHIZOPHRENIA (HCC): ICD-10-CM

## 2023-01-03 DIAGNOSIS — G40.89 OTHER SEIZURES (HCC): ICD-10-CM

## 2023-01-03 DIAGNOSIS — B18.2 CHRONIC HEPATITIS C WITHOUT HEPATIC COMA (HCC): ICD-10-CM

## 2023-01-03 DIAGNOSIS — J44.1 COPD WITH ACUTE EXACERBATION (HCC): ICD-10-CM

## 2023-01-03 DIAGNOSIS — I25.119 CORONARY ARTERY DISEASE INVOLVING NATIVE HEART WITH ANGINA PECTORIS, UNSPECIFIED VESSEL OR LESION TYPE (HCC): ICD-10-CM

## 2023-01-03 DIAGNOSIS — G81.94 HEMIPARESIS, LEFT (HCC): ICD-10-CM

## 2023-01-03 DIAGNOSIS — I11.0 HYPERTENSIVE HEART DISEASE WITH HEART FAILURE (HCC): ICD-10-CM

## 2023-01-03 DIAGNOSIS — G62.81 CRITICAL ILLNESS POLYNEUROPATHY (HCC): ICD-10-CM

## 2023-01-03 PROCEDURE — 99305 1ST NF CARE MODERATE MDM 35: CPT | Performed by: INTERNAL MEDICINE

## 2023-01-03 PROCEDURE — G8484 FLU IMMUNIZE NO ADMIN: HCPCS | Performed by: INTERNAL MEDICINE

## 2023-01-03 RX ORDER — DIPHENOXYLATE HYDROCHLORIDE AND ATROPINE SULFATE 2.5; .025 MG/1; MG/1
1 TABLET ORAL EVERY 12 HOURS PRN
Qty: 60 TABLET | Refills: 0 | Status: SHIPPED | OUTPATIENT
Start: 2023-01-03 | End: 2023-02-02

## 2023-01-03 RX ORDER — OXYCODONE HYDROCHLORIDE AND ACETAMINOPHEN 5; 325 MG/1; MG/1
1 TABLET ORAL EVERY 6 HOURS PRN
Qty: 28 TABLET | Refills: 0 | Status: SHIPPED | OUTPATIENT
Start: 2023-01-03 | End: 2023-01-10

## 2023-01-04 ENCOUNTER — OFFICE VISIT (OUTPATIENT)
Dept: GERIATRIC MEDICINE | Age: 63
End: 2023-01-04
Payer: MEDICARE

## 2023-01-04 DIAGNOSIS — Z99.2 HEMODIALYSIS PATIENT (HCC): ICD-10-CM

## 2023-01-04 DIAGNOSIS — Z96.652 STATUS POST LEFT KNEE REPLACEMENT: ICD-10-CM

## 2023-01-04 DIAGNOSIS — F51.01 PRIMARY INSOMNIA: Primary | ICD-10-CM

## 2023-01-04 DIAGNOSIS — I95.3 HEMODIALYSIS-ASSOCIATED HYPOTENSION: Chronic | ICD-10-CM

## 2023-01-04 DIAGNOSIS — Z99.2 DEPENDENT ON HEMODIALYSIS (HCC): ICD-10-CM

## 2023-01-04 DIAGNOSIS — E11.21 DIABETIC NEPHROPATHY WITH PROTEINURIA (HCC): ICD-10-CM

## 2023-01-04 PROCEDURE — G8484 FLU IMMUNIZE NO ADMIN: HCPCS | Performed by: PHYSICIAN ASSISTANT

## 2023-01-04 PROCEDURE — 3046F HEMOGLOBIN A1C LEVEL >9.0%: CPT | Performed by: PHYSICIAN ASSISTANT

## 2023-01-04 PROCEDURE — 99309 SBSQ NF CARE MODERATE MDM 30: CPT | Performed by: PHYSICIAN ASSISTANT

## 2023-01-05 ENCOUNTER — OFFICE VISIT (OUTPATIENT)
Dept: GERIATRIC MEDICINE | Age: 63
End: 2023-01-05

## 2023-01-05 DIAGNOSIS — Z79.4 CONTROLLED TYPE 2 DIABETES MELLITUS WITH DIABETIC NEUROPATHY, WITH LONG-TERM CURRENT USE OF INSULIN (HCC): ICD-10-CM

## 2023-01-05 DIAGNOSIS — J43.1 PANLOBULAR EMPHYSEMA (HCC): ICD-10-CM

## 2023-01-05 DIAGNOSIS — E11.40 CONTROLLED TYPE 2 DIABETES MELLITUS WITH DIABETIC NEUROPATHY, WITH LONG-TERM CURRENT USE OF INSULIN (HCC): ICD-10-CM

## 2023-01-05 DIAGNOSIS — R26.0 ATAXIC GAIT: Primary | ICD-10-CM

## 2023-01-05 DIAGNOSIS — I50.32 CHRONIC DIASTOLIC CONGESTIVE HEART FAILURE (HCC): Chronic | ICD-10-CM

## 2023-01-06 LAB
ALBUMIN SERPL-MCNC: 3.2 G/DL
ALP BLD-CCNC: 119 U/L
ALT SERPL-CCNC: 25 U/L
ANION GAP SERPL CALCULATED.3IONS-SCNC: NORMAL MMOL/L
AST SERPL-CCNC: 31 U/L
BILIRUB SERPL-MCNC: 0.6 MG/DL (ref 0.1–1.4)
BUN BLDV-MCNC: 14 MG/DL
CALCIUM SERPL-MCNC: 9.4 MG/DL
CHLORIDE BLD-SCNC: 95 MMOL/L
CO2: 32 MMOL/L
CREAT SERPL-MCNC: 2.8 MG/DL
GFR CALCULATED: 17
GLUCOSE BLD-MCNC: 116 MG/DL
POTASSIUM SERPL-SCNC: 3.8 MMOL/L
SODIUM BLD-SCNC: 137 MMOL/L
TOTAL PROTEIN: 7.2

## 2023-01-18 ENCOUNTER — OFFICE VISIT (OUTPATIENT)
Dept: GERIATRIC MEDICINE | Age: 63
End: 2023-01-18
Payer: MEDICARE

## 2023-01-18 DIAGNOSIS — Z79.4 CONTROLLED TYPE 2 DIABETES MELLITUS WITH DIABETIC NEUROPATHY, WITH LONG-TERM CURRENT USE OF INSULIN (HCC): ICD-10-CM

## 2023-01-18 DIAGNOSIS — Z96.652 S/P REVISION OF TOTAL KNEE, LEFT: Primary | ICD-10-CM

## 2023-01-18 DIAGNOSIS — N18.6 CKD (CHRONIC KIDNEY DISEASE) REQUIRING CHRONIC DIALYSIS (HCC): ICD-10-CM

## 2023-01-18 DIAGNOSIS — E11.40 CONTROLLED TYPE 2 DIABETES MELLITUS WITH DIABETIC NEUROPATHY, WITH LONG-TERM CURRENT USE OF INSULIN (HCC): ICD-10-CM

## 2023-01-18 DIAGNOSIS — Z99.2 CKD (CHRONIC KIDNEY DISEASE) REQUIRING CHRONIC DIALYSIS (HCC): ICD-10-CM

## 2023-01-18 DIAGNOSIS — B37.31 VAGINAL CANDIDIASIS: ICD-10-CM

## 2023-01-18 PROCEDURE — 3046F HEMOGLOBIN A1C LEVEL >9.0%: CPT | Performed by: PHYSICIAN ASSISTANT

## 2023-01-18 PROCEDURE — 99309 SBSQ NF CARE MODERATE MDM 30: CPT | Performed by: PHYSICIAN ASSISTANT

## 2023-01-18 PROCEDURE — G8484 FLU IMMUNIZE NO ADMIN: HCPCS | Performed by: PHYSICIAN ASSISTANT

## 2023-01-23 ENCOUNTER — OFFICE VISIT (OUTPATIENT)
Dept: GERIATRIC MEDICINE | Age: 63
End: 2023-01-23
Payer: MEDICARE

## 2023-01-23 DIAGNOSIS — Z96.652 S/P REVISION OF TOTAL KNEE, LEFT: Primary | ICD-10-CM

## 2023-01-23 DIAGNOSIS — I50.32 CHRONIC DIASTOLIC CONGESTIVE HEART FAILURE (HCC): Chronic | ICD-10-CM

## 2023-01-23 DIAGNOSIS — I27.20 PULMONARY HYPERTENSION, UNSPECIFIED (HCC): ICD-10-CM

## 2023-01-23 DIAGNOSIS — M25.562 CHRONIC PAIN OF LEFT KNEE: ICD-10-CM

## 2023-01-23 DIAGNOSIS — G89.29 CHRONIC PAIN OF LEFT KNEE: ICD-10-CM

## 2023-01-23 PROCEDURE — 99316 NF DSCHRG MGMT 30 MIN+: CPT | Performed by: PHYSICIAN ASSISTANT

## 2023-01-23 PROCEDURE — G8484 FLU IMMUNIZE NO ADMIN: HCPCS | Performed by: PHYSICIAN ASSISTANT

## 2023-01-25 LAB
BUN BLDV-MCNC: 25 MG/DL
CALCIUM SERPL-MCNC: 9.1 MG/DL
CHLORIDE BLD-SCNC: 96 MMOL/L
CO2: 29 MMOL/L
CREAT SERPL-MCNC: 2.8 MG/DL
GFR SERPL CREATININE-BSD FRML MDRD: 17 ML/MIN/{1.73_M2}
GLUCOSE BLD-MCNC: 180 MG/DL
POTASSIUM SERPL-SCNC: 3.8 MMOL/L
SODIUM BLD-SCNC: 137 MMOL/L

## 2023-01-25 ASSESSMENT — ENCOUNTER SYMPTOMS
SHORTNESS OF BREATH: 0
COUGH: 0
BACK PAIN: 1
ABDOMINAL DISTENTION: 0

## 2023-01-25 NOTE — PROGRESS NOTES
Subjective:      Patient ID: Gordon Hoffman is a pleasant 70 y.o. female who presents today for:  No chief complaint on file. [facility]    Patient seen today to follow-up on recent left total knee replacement recovery. Additionally patient having some increased insomnia and requesting more time. Will oblige. Patient has no new evident exudate or infection signs. Left knee. Patient does complain of some mild to moderate pain to the site with physical therapy. She does see Dr. Bhakti Jc on 1/20/2023 for follow-up. Currently not WBAT. We will plan on adding tramadol rechecking labs on Monday, and adding her melatonin as needed. Patient offers no new complaints otherwise. She is attending HD regularly. Monitoring labs there. No new hypotension reportedly.   Vital signs been overall stable since arrival.      Patient Active Problem List   Diagnosis    Atherosclerotic heart disease of native coronary artery with unspecified angina pectoris (HCC)    Schizophrenia, paranoid, chronic    Metabolic syndrome    Vitamin B 12 deficiency    Cerebral microvascular disease    Mixed hyperlipidemia    Other hammer toe (acquired)    Vitamin D insufficiency    Incontinence of urine    Diabetic nephropathy with proteinuria ([address]    Essential (primary) hypertension    History of type C viral hepatitis    Urinary incontinence due to cognitive impairment    History of seizures    Stented coronary artery-plan is to stay on Plavix indefinately per Dr Sarai Aranda    Other specified diabetes mellitus with diabetic neuropathy, unspecified ([address]    Controlled type 2 diabetes mellitus with diabetic neuropathy, with long-term current use of insulin (HCC)    Hemiparesis, left (HCC)    Angina, class II ([address]    Pain, unspecified    Tardive dyskinesia    Shortness of breath    Uncontrolled type 2 diabetes mellitus with hyperglycemia (HCC)    Chronic diastolic congestive heart failure ([address]    Sleep apnea, unspecified    Pulmonary hypertension, unspecified ([address]    Class 2 severe obesity with serious comorbidity and body mass index (BMI) of 36.0 to 36.9 in adult [facility])    Edema    Closed supracondylar fracture of right humerus    Other chronic pain    Palliative care patient    Recurrent falls    Renal failure    Difficulty in walking    ESRD (end stage renal disease) on dialysis ([facility])    Weakness    Other seizures ([facility])    Moderate persistent asthma without complication    [id number]    Post PTCA    Falls frequently    Chest wall contusion, left, initial encounter    Headache, unspecified    Paranoid schizophrenia ([facility])    Compression fracture of spine ([address]    Closed rib fracture    Depression    Chronic obstructive pulmonary disease ([facility])    Critical illness polyneuropathy ([address]    Multiple closed fractures of ribs of right side    Nonrheumatic mitral valve regurgitation    Nonrheumatic tricuspid valve regurgitation    Need for extended care facility    Chronic pain of both shoulders    Hemodialysis-associated hypotension    Anginal chest pain at rest [facility])    Chest pain    Unstable angina ([facility])    NSTEMI (non-ST elevated myocardial infarction) ([facility])    CKD (chronic kidney disease) stage 4, GFR 15-29 ml/min ([facility])    Hyperkalemia    Impaired mobility and activities of daily living dt polyneuropathy and reccent fall     Dialysis patient [facility])    Unspecified open wound of right upper arm, initial encounter    Multiple closed fractures of right lower extremity and ribs    Closed T11 fracture ([facility])    Encephalopathy acute    MRSA bacteremia    Sepsis due to Enterococcus ([address]    Local infection due to central venous catheter    DJD (degenerative joint disease), cervical    Closed head injury    Sarcopenia    Fall from standing    Septicemia ([address]    Chronic hepatitis C ([address]    Catheter-related bloodstream infection    Enterococcus faecalis infection    Cervical stenosis of spinal canal    Ataxic gait    Lumbar stenosis with neurogenic claudication    Falls infrequently    Infection of venous access port     Past Medical History:   Diagnosis Date    Angina at rest [facility]) 5/24/2020    Anxiety     CAD S/P percutaneous coronary angioplasty 2015, 2018    stents per dr Frank Vasquez    CHF (congestive heart failure) ([address]     CKD (chronic kidney disease) stage 4, GFR 15-29 ml/min ([address] 2/24/2018    CKD stage 4 due to type 2 diabetes mellitus ([address]     Contusion of right chest wall 2/16/2021    COPD (chronic obstructive pulmonary disease) ([address]     Diabetic nephropathy with proteinuria ([address] 2014    DJD (degenerative joint disease) of knee     Dr Karen Kuo    GERD (gastroesophageal reflux disease)     Hemiparesis, left ([address] 2013    entered Assisted Living ([facility])    Hemodialysis patient [facility])     Hemodialysis-associated hypotension 10/22/2021    History of heart failure     History of seizures     History of type C viral hepatitis     HTN (hypertension)     Hyperlipidemia     Impaired mobility and activities of daily living     Infection of venous access port 7/29/2022    Mediastinal lymphadenopathy 2013    Dr Tereza Drummond    Metabolic syndrome     Moderate persistent asthma without complication 8/64/9684    Need for extended care facility 7/7/2021    Neurogenic urinary incontinence 2013    Neuropathy in diabetes ([address]     Nonrheumatic mitral valve regurgitation 7/7/2021    Nonrheumatic tricuspid valve regurgitation 7/7/2021    Obesity (BMI 30-39. 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 Francisco Javier Benton      x1 Dr. Leila Orosco, Dr Devon Smith  08/10/2021    [facility]    DIAGNOSTIC CARDIAC CATH LAB PROCEDURE  10/02/2019    DIALYSIS CATHETER INSERTION Left 2022    Tunneled Symetrex 15.5F x 23cm hemodialysis catheter inserted by Dr. Renee Kaufman Left 2022    15.7Mi13ey replamcent tunneld HD cath by Dr. Lenora Cuello, TOTAL ABDOMINAL (CERVIX REMOVED)      one ovary intact, Dr Mariella Lawson, menorrhagia    IR THROMBECTOMY PERCUT AVF  2022    IR THROMBECTOMY PERCUT AVF 2022 [facility] PROCEDURE    IR TUNNELED CATHETER PLACEMENT GREATER THAN 5 YEARS  2022    IR TUNNELED CATHETER PLACEMENT GREATER THAN 5 YEARS 6/3/2022 [facility] PROCEDURE    IR TUNNELED CATHETER PLACEMENT GREATER THAN 5 YEARS Left 2022 Dr. Mekhi Tatum exchanged to 15.5F x 28 cm.      15.5 fr by 23 cm Symetrex dialysis catheter inserted by Dr Mekhi Tatum    IR TUNNELED [address] 5 YEARS  2022    IR TUNNELED CATHETER PLACEMENT GREATER THAN 5 YEARS 2022 [facility] SPECIAL PROCEDURE    AL TOTAL KNEE ARTHROPLASTY Left 2018    LEFT KNEE TOTAL KNEE ARTHROPLASTY, SHAYNA, NERVE BLOCK [facility]ney[facility] [address] [facility]     TOTAL KNEE ARTHROPLASTY  2016    Dr Tony Serrano    TUNNELED [address] Right 2020    tunneled HD catheter per Dr Raymond Schuster History    Marital status:      Spouse name: Not on file    Number of children: 2    Years of education: Not on file    Highest education level: Not on file   Occupational History Occupation: disabled   Tobacco Use    Smoking status: Never    Smokeless tobacco: Never   Vaping Use    Vaping Use: Never used   Substance and Sexual Activity    Alcohol use: No     Alcohol/week: 0.0 standard drinks    Drug use: No    Sexual activity: Not Currently   Other Topics Concern    Not on file   Social History Narrative    Disabled dt depression and low back pain, lives in [place] [facility] [address] is close by and is her paid caregiver via waiver services    HD via [employer], [id number], [facility], Sat. Son is in the home and has CP and is total care-and is also cared for by a waiver by Kiarra Lama. 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 Per daughter, her brother is blind, MRDD, CP multiple health issues. Daughter's  is patient's legal guardian. Patient has hemodialysis Tuesday Thursday and Saturday. Patient's bedroom is on main floor with a half bath. Daughter walks patient upstairs once weekly for full bath. Patient is using her walker in the home. Patient has a hospital bed in the home.      Social Determinants of Health     Financial Resource Strain: Low Risk     Difficulty of Paying Living Expenses: Not hard at all   Food Insecurity: No Food Insecurity    Worried About [address] in the Last Year: Never true    [address] in the Last Year: Never true   Transportation Needs: No Transportation Needs    Lack of Transportation (Medical): No    Lack of Transportation (Non-Medical): No   Physical Activity: Sufficiently Active    Days of Exercise per Week: 3 days    Minutes of Exercise per Session: 60 min   Stress: Not on file   Social Connections: Not on file   Intimate Partner Violence: Not on file   Housing Stability: Not on file     Family History   Problem Relation Age of Onset    Cancer Mother 76        survived    Breast Cancer Mother     Hypertension Father     Diabetes Sister     Mental Illness Sister     Colon Cancer Neg Hx      Allergies   Allergen Reactions    Codeine Hives     hives    Oxycontin [Oxycodone Hcl] Hives       Review of Systems   Constitutional:  Positive for fatigue. Negative for activity change, appetite change and fever. HENT:  Negative for congestion. Respiratory:  Negative for cough and shortness of breath. Cardiovascular:  Negative for chest pain, palpitations and leg swelling. Gastrointestinal:  Negative for abdominal distention. Genitourinary:  Negative for difficulty urinating and dysuria. Musculoskeletal:  Positive for arthralgias (Left knee pain secondary to knee replacement), back pain and gait problem (Nonweightbearing). Neurological:  Positive for weakness. Negative for dizziness, light-headedness and headaches. Psychiatric/Behavioral:  Negative for agitation and confusion. The patient is not nervous/anxious. All other systems reviewed and are negative. Objective:   VS: See [address] Reviewed. Physical Exam  Vitals reviewed. Constitutional:       General: She is not in acute distress. Appearance: Normal appearance. She is obese. She is not ill-appearing. HENT:      Head: Normocephalic and atraumatic. Mouth/Throat:      Mouth: Mucous membranes are moist.      Pharynx: Oropharynx is clear. Eyes:      Extraocular Movements: Extraocular movements intact.       Conjunctiva/sclera: Conjunctivae normal.      Pupils: Pupils are equal, round, and reactive to light. Cardiovascular:      Rate and Rhythm: Normal rate and regular rhythm. Pulmonary:      Effort: Pulmonary effort is normal.      Breath sounds: Normal breath sounds. Abdominal:      General: Bowel sounds are normal. There is distension. Palpations: Abdomen is soft. Tenderness: There is no abdominal tenderness. There is no guarding. Musculoskeletal:         General: No deformity. Right lower leg: Edema present. Left lower leg: Edema present. Comments: Limited ROM to left knee secondary to surgery  Mild BLE edema. Negative calf tenderness   Skin:     General: Skin is warm and dry. Coloration: Skin is not pale. Findings: No erythema. Neurological:      General: No focal deficit present. Mental Status: She is alert and oriented to person, place, and time. Motor: Weakness present. Gait: Gait abnormal.   Psychiatric:         Mood and Affect: Mood normal.         Behavior: Behavior normal.         Thought Content: Thought content normal.       Assessment:       Diagnosis Orders   1. Primary insomnia        2. Diabetic nephropathy with proteinuria ([facility]        3. Dependent on hemodialysis ([facility]        4. Hemodialysis-associated hypotension        5. Hemodialysis patient ([facility]        6. Status post left knee replacement              Plan: We will plan on adding tramadol 50 mg p.o. 3 times daily as needed for the next 2 weeks. Recheck labs CBC and BMP on Monday. Add melatonin 3 mg p.o. nightly routine    No follow-ups on file. Eliane Carreonma      Please note orders entered on site at facility after discussion with appropriate facility nursing/therapy/ / nutritional staff. Current longstanding medical problems and acute medical issues addressed with staff. Available data and data elements in on site paper chart reviewed and analyzed.   Current external consultant notes reviewed in on site chart. Ordered laboratory testing and imaging will be reviewed when available. This patient's need for psychiatric medication has been reviewed. Will consider further adjustment and possible further evaluations by mental health services. Side effects, adverse effects of the medication prescribed today, as well as treatment plan and result expectations have been discussed withthe patient who expresses understanding and desires to proceed. I spent a total of 23 minutes on the date of service which included preparing to see the patient, face-to-face patient care, performing a medically appropriate examination, completing clinical documentation, and on counseling/ eductaing the patient and the family. Please note Nuance Dragon PowerMic III software used for dictation of note,  which may contain minor errors due to ambient noise and indiscriminate speech pickup.

## 2023-01-30 ENCOUNTER — CLINICAL DOCUMENTATION ONLY (OUTPATIENT)
Facility: CLINIC | Age: 63
End: 2023-01-30

## 2023-02-02 ASSESSMENT — ENCOUNTER SYMPTOMS
COUGH: 1
BACK PAIN: 1

## 2023-02-02 NOTE — PROGRESS NOTES
Patient Name: Jorge A Hennessy    YOB: 1960  Medical Record Number: [id number]      History of Present Illness: This 75-year-old woman was admitted here after recent hospitalization. Patient had prior intervention for her knee did undergo surgical intervention. Patient did have subsequent likely infection in the left knee patient did undergo revision patient was hospitalized patient did well patient required new tunneled dialysis catheter placement. As she had issues with the previous of function. Patient did undergo course physical therapy with ongoing pain she was stabilized simply transferred here for course rehabilitation. Today seen in room ongoing discomfort in her affected knee no recent headaches fevers chills no coughing at this time. Review of Systems   Constitutional:  Positive for activity change, appetite change and fatigue. HENT:  Positive for congestion. Respiratory:  Positive for cough. Musculoskeletal:  Positive for arthralgias, back pain, gait problem and joint swelling. Neurological:  Positive for weakness. All other systems reviewed and are negative.     Review of Systems: All 14 review of systems negative other than as stated above    Social History     Tobacco Use    Smoking status: Never    Smokeless tobacco: Never   Vaping Use    Vaping Use: Never used   Substance Use Topics    Alcohol use: No     Alcohol/week: 0.0 standard drinks    Drug use: No         Past Medical History:   Diagnosis Date    Angina at rest [facility]) 5/24/2020    Anxiety     CAD S/P percutaneous coronary angioplasty 2015, 2018    stents per dr Go Lewis    CHF (congestive heart failure) ([address]     CKD (chronic kidney disease) stage 4, GFR 15-29 ml/min ([address] 2/24/2018    CKD stage 4 due to type 2 diabetes mellitus ([address]     Contusion of right chest wall 2/16/2021    COPD (chronic obstructive pulmonary disease) ([address]     Diabetic nephropathy with proteinuria ([address] 2014    LEIGH ANN (degenerative joint disease) of knee     Dr Siddharth Preston    GERD (gastroesophageal reflux disease)     Hemiparesis, left ([address] 2013    entered [facility])    Hemodialysis patient [facility])     Hemodialysis-associated hypotension 10/22/2021    History of heart failure     History of seizures     History of type C viral hepatitis     HTN (hypertension)     Hyperlipidemia     Impaired mobility and activities of daily living     Infection of venous access port 2022    Mediastinal lymphadenopathy     Dr Fredrick Boston    Metabolic syndrome     Moderate persistent asthma without complication     Need for extended care facility 2021    Neurogenic urinary incontinence 2013    Neuropathy in diabetes [facility])     Nonrheumatic mitral valve regurgitation 2021    Nonrheumatic tricuspid valve regurgitation 2021    Obesity (BMI 30-39. 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 Deborah Dumont      x1 Dr. Marilynn Hooks, Dr Hafsa Campbell  08/10/2021    [facility]    DIAGNOSTIC CARDIAC CATH LAB PROCEDURE  10/02/2019    DIALYSIS CATHETER INSERTION Left 2022    Tunneled Symetrex 15.5F x 23cm hemodialysis catheter inserted by Dr. Mederos Smoke Left 07/18/2022    15.4Qw19cc replamcent tunneld HD cath by Dr. Germania Grimes, TOTAL ABDOMINAL (CERVIX REMOVED)      one ovary intact, Dr Mary Ziegler, menorrhagia    IR THROMBECTOMY PERCUT AVF  06/06/2022    IR THROMBECTOMY PERCUT AVF 6/6/2022 [facility]ED[facility]    IR TUNNELED CATHETER PLACEMENT GREATER THAN 5 YEARS  06/03/2022    IR TUNNELED CATHETER PLACEMENT GREATER THAN 5 YEARS 6/3/2022 [facility]    IR TUNNELED CATHETER PLACEMENT GREATER THAN 5 YEARS Left 07/07/2022 7/18/22 Dr. Kapil Parra exchanged to 15.5F x 28 cm.      15.5 fr by 23 cm Symetrex dialysis catheter inserted by Dr Kapil Parra    IR TUNNELED [address] 5 YEARS  07/07/2022    IR TUNNELED CATHETER PLACEMENT GREATER THAN 5 YEARS 7/7/2022 [facility] PROCED[facility]    NE TOTAL KNEE ARTHROPLASTY Left 06/21/2018    LEFT KNEE TOTAL KNEE ARTHROPLASTY, SHAYNA, NERVE BLOCK performed by Priyanka Allen MD at [address] [place], [place] [address]Third Floor Right     TOTAL KNEE ARTHROPLASTY  05/19/2016    Dr Potter Oh    TUNNELED [address] Right 07/01/2020    tunneled HD catheter per Dr Hooper Hence Medications on File Prior to Visit   Medication Sig Dispense Refill    diphenoxylate-atropine (LOMOTIL) 2.5-0.025 MG per tablet Take 1 tablet by mouth every 12 hours as needed for Diarrhea for up to 30 days.  Max Daily Amount: 2 tablets 60 tablet 0    albuterol (PROVENTIL) 2.5 MG/0.5ML NEBU nebulizer solution Take 2.5 mg by nebulization every 4 hours as needed for Wheezing or Shortness of Breath      LACTOBACILLUS PO Take 2 capsules by mouth 2 times daily Indications: Nutritional Support      insulin lispro (HUMALOG) 100 UNIT/ML injection vial Inject 12 Units into the skin 3 times daily (before meals) Indications: Type 2 Diabetes      insulin lispro (HUMALOG) 100 UNIT/ML injection vial Inject 0-10 Units into the skin 3 times daily (before meals) Indications: Type 2 Diabetes Inject as per sliding scale: If 151-200=2u; 201-250-4u; 251-300=6u; 301-350=8u; 351-400=10u. Call MD/NP if >400. insulin lispro (HUMALOG) 100 UNIT/ML injection vial Inject 0-4 Units into the skin nightly Indications: Type 2 Diabetes Inject as per sliding scale: If 201-250=1u; 251-300=2u; 301-350=3u; 351-400=4u. Call MD/NP if >400.      b complex-C-folic acid (NEPHROCAPS) 1 MG capsule Take 1 capsule by mouth daily Indications: Dialysis Dependent Chronic Kidney Failure      pantoprazole (PROTONIX) 20 MG tablet Take 20 mg by mouth daily Indications: Gastroesophageal Reflux Disease      nitroGLYCERIN (NITROSTAT) 0.4 MG SL tablet up to max of 3 total doses. If no relief after 1 dose, call 911. 25 tablet 3    hydrOXYzine HCl (ATARAX) 25 MG tablet Take 25 mg by mouth every 8 hours as needed for Itching      loperamide (IMODIUM A-D) 2 MG tablet Take 2 mg by mouth every 6 hours as needed for Diarrhea Indications: Diarrhea      SURE COMFORT PEN NEEDLES 30G X 8 MM MISC USE AS DIRECTED FIVE TIMES A DAILY 100 each 10    LANTUS SOLOSTAR 100 UNIT/ML injection pen INJECT 55 UNITS SUBCUTANEOUSLY AT BEDTIME (Patient taking differently: Inject 55 Units into the skin nightly Indications: Type 2 Diabetes INJECT 55 UNITS SUBCUTANEOUSLY AT BEDTIME) 15 mL 10    ARIPiprazole (ABILIFY) 5 MG tablet TAKE 1 TABLET BY MOUTH ONCE DAILY (Patient taking differently: Take 5 mg by mouth daily Indications: Paranoid Schizophrenia TAKE 1 TABLET BY MOUTH ONCE DAILY) 30 tablet 10    sertraline (ZOLOFT) 50 MG tablet Take 1 tablet by mouth nightly (Patient taking differently: Take 50 mg by mouth nightly Indications: Depression) 30 tablet 5    atorvastatin (LIPITOR) 40 MG tablet Take 1 tablet by mouth nightly (Patient taking differently: Take 40 mg by mouth nightly Indications: High Amount of Fats in the Blood) 30 tablet 5    midodrine (PROAMATINE) 5 MG tablet Take 1 tablet by mouth one time a day every Tue, Thu, Sat for hypotension.  Give 30 mins prior to dialysis. (Patient taking differently: Take 5 mg by mouth three times a week Take 1 tablet by mouth one time a day every Tue, Thu, Sat for hypotension. Give 30 mins prior to dialysis.) 90 tablet 3    Handicap Placard MISC by Does not apply route Expiration in 5 years. 1 each 0    blood glucose test strips (ONETOUCH VERIO) strip  each 3    OneTouch Delica Lancets 49L MISC  each 3    Blood Glucose Monitoring Suppl (ONETOUCH VERIO) w/Device KIT AS DIRECTED 1 kit 0    aspirin EC 81 MG EC tablet Take 1 tablet by mouth daily (Patient taking differently: Take 81 mg by mouth daily Indications: Thickening and Hardening of Heart Arteries) 30 tablet 12    blood glucose test strips (FREESTYLE LITE) strip 1 each by Does not apply route 4 times daily (before meals and nightly) As needed. 200 strip 5    Alcohol Swabs (EASY TOUCH ALCOHOL PREP MEDIUM) 70 % PADS USE AS DIRECTED THREE TIMES A  each 3    FreeStyle Lancets MISC Test 4x daily 150 each 3    acetaminophen (TYLENOL) 325 MG tablet Take 2 tablets by mouth every 4 hours as needed for Pain (For mild to moderate pain (Pain 1-6 out of 10 on pain scale)) 120 tablet 0    Blood Glucose Monitoring Suppl (FREESTYLE LITE) CORETTA 1 Device by Does not apply route daily as needed (Diabetes) Use freestyle meter to test blood sugar as needed 1 Device 0     No current facility-administered medications on file prior to visit. Allergies   Allergen Reactions    Codeine Hives     hives    Oxycontin [Oxycodone Hcl] Hives         Family History   Problem Relation Age of Onset    Cancer Mother 76        survived    Breast Cancer Mother     Hypertension Father     Diabetes Sister     Mental Illness Sister     Colon Cancer Neg Hx          Physical Exam:      Physical Exam  Vitals and nursing note reviewed. Constitutional:       Appearance: Normal appearance. She is normal weight. HENT:      Head: Normocephalic and atraumatic.       Nose: Nose normal. Mouth/Throat:      Mouth: Mucous membranes are moist.   Cardiovascular:      Rate and Rhythm: Normal rate and regular rhythm. Pulses: Normal pulses. Pulmonary:      Effort: Pulmonary effort is normal.      Breath sounds: No rhonchi. Abdominal:      General: Bowel sounds are normal.      Palpations: Abdomen is soft. Musculoskeletal:         General: Swelling present. Cervical back: Normal range of motion. Left lower leg: Edema present. Skin:     General: Skin is warm. Findings: Bruising and lesion present. Comments: Left knee incisional site intact   Neurological:      Mental Status: Mental status is at baseline. Motor: Weakness present. Psychiatric:         Mood and Affect: Mood normal.       not currently breastfeeding.       .   Lab Results   Component Value Date    WBC 10.7 11/12/2022    HGB 11.9 (L) 11/12/2022    HCT 30.7 (L) 11/12/2022    MCV 84.0 11/12/2022     11/12/2022     Lab Results   Component Value Date/Time     01/25/2023 12:00 AM    K 3.8 01/25/2023 12:00 AM    K 4.3 07/04/2022 03:07 AM    CL 96 01/25/2023 12:00 AM    CO2 29 01/25/2023 12:00 AM    BUN 25 01/25/2023 12:00 AM    CREATININE 2.8 01/25/2023 12:00 AM    GLUCOSE 180 01/25/2023 12:00 AM    GLUCOSE 90 10/24/2022 10:43 AM    CALCIUM 9.1 01/25/2023 12:00 AM                ASSESSMENT:  Patient Active Problem List   Diagnosis    Atherosclerotic heart disease of native coronary artery with unspecified angina pectoris (HCC)    Schizophrenia, paranoid, chronic    Metabolic syndrome    Vitamin B 12 deficiency    Cerebral microvascular disease    Mixed hyperlipidemia    Other hammer toe (acquired)    Vitamin D insufficiency    Incontinence of urine    Diabetic nephropathy with proteinuria (HCC)    Essential (primary) hypertension    History of type C viral hepatitis    Urinary incontinence due to cognitive impairment    History of seizures    Stented coronary artery-plan is to stay on Plavix indefinately per Dr Lawanda Helm    Other specified diabetes mellitus with diabetic neuropathy, unspecified ([address]    Controlled type 2 diabetes mellitus with diabetic neuropathy, with long-term current use of insulin ([facility])    Hemiparesis, left ([facility])    Angina, class II ([address]    Pain, unspecified    Tardive dyskinesia    Shortness of breath    Uncontrolled type 2 diabetes mellitus with hyperglycemia ([facility])    Chronic diastolic congestive heart failure ([facility])    Sleep apnea, unspecified    Pulmonary hypertension, unspecified ([facility])    Class 2 severe obesity with serious comorbidity and body mass index (BMI) of 36.0 to 36.9 in adult [facility])    Edema    Closed supracondylar fracture of right humerus    Other chronic pain    Palliative care patient    Recurrent falls    Renal failure    Difficulty in walking    ESRD (end stage renal disease) on dialysis ([facility])    Weakness    Other seizures ([facility])    Moderate persistent asthma without complication    [id number]    Post PTCA    Falls frequently    Chest wall contusion, left, initial encounter    Headache, unspecified    Paranoid schizophrenia ([address]    Compression fracture of spine ([address]    Closed rib fracture    Depression    Chronic obstructive pulmonary disease ([address]    Critical illness polyneuropathy ([address]    Multiple closed fractures of ribs of right side    Nonrheumatic mitral valve regurgitation    Nonrheumatic tricuspid valve regurgitation    Need for extended care facility    Chronic pain of both shoulders    Hemodialysis-associated hypotension    Anginal chest pain at rest [facility])    Chest pain    Unstable angina ([facility])    NSTEMI (non-ST elevated myocardial infarction) ([facility])    CKD (chronic kidney disease) stage 4, GFR 15-29 ml/min ([facility])    Hyperkalemia    Impaired mobility and activities of daily living dt polyneuropathy and reccent fall     Dialysis patient [facility])    Unspecified open wound of right upper arm, initial encounter    Multiple closed fractures of right lower extremity and ribs    Closed T11 fracture (HCC)    Encephalopathy acute    MRSA bacteremia    Sepsis due to Enterococcus [facility])    Local infection due to central venous catheter    DJD (degenerative joint disease), cervical    Closed head injury    Sarcopenia    Fall from standing    Septicemia [facility]    Chronic hepatitis C ([facility]    Catheter-related bloodstream infection    Enterococcus faecalis infection    Cervical stenosis of spinal canal    Ataxic gait    Lumbar stenosis with neurogenic claudication    Falls infrequently    Infection of venous access port         PLAN:   Diagnosis Orders   1. Paranoid schizophrenia ([facility]        2. Critical illness polyneuropathy ([facility]        3. Hemiparesis, left ([facility]        4. COPD with acute exacerbation ([facility]        5. Hypertensive heart disease with heart failure ([facility]        6. Other seizures ([facility]        7. Chronic hepatitis C without hepatic coma (HCC)        8. Coronary artery disease involving native heart with angina pectoris, unspecified vessel or lesion type [facility])          Course of physical therapy outpatient therapy for support. Goal maximize functional status. Repeat CBC BMP pending. .  Patient monitored closely for possibility of acute infection PE. Patient's fever curve to be monitored monitor renal function. At risk for falls. At risk for delirium. Current infections    Please note orders entered on site at facility after discussion with appropriate facility nursing/therapy/ / nutritional staff. Current longstanding medical problems and acute medical issues addressed with staff. Available data and data elements in on site paper chart reviewed and analyzed. Current external consultant notes reviewed in on site chart. Ordered laboratory testing and imaging will be reviewed when available. This patient's need for psychiatric medication has been reviewed. Will consider further adjustment and possible further evaluations by mental health services.

## 2023-02-04 NOTE — PROGRESS NOTES
SUBJECTIVE:  40-year-old woman seen heart failure diabetes weakness. Patient doing well at this time and was acute psychosis coughing currently nonproductive sputum oral intake is been good no new chest palpitation blood sugars noted nursing staff      ROS: Coughing intermittently denies chest palpitation  The rest of the 14 point ROS negative    PHYSICAL EXAM: VSS per facility record  Alert orient x2 pupils reactive oral mucosa moist chest with coarse breath sounds cardiovascular showed a regular rate abdomen soft nontender EXTR trace edema skin no new rash    ASSESSMENT & PLAN:   Diagnosis Orders   1. Ataxic gait        2. Chronic diastolic congestive heart failure (HCC)        3. Panlobular emphysema ([address]        4. Controlled type 2 diabetes mellitus with diabetic neuropathy, with long-term current use of insulin ([facility])            Course of therapy as needed monitor Rester status monitoring weights. Diurese intermittently blood sugar pending.   Follow-up A1c pending          Past Medical History:   Diagnosis Date    Angina at rest [facility]) 5/24/2020    Anxiety     CAD S/P percutaneous coronary angioplasty 2015, 2018    stents per dr Reina Nuñez    CHF (congestive heart failure) ([address]     CKD (chronic kidney disease) stage 4, GFR 15-29 ml/min ([address] 2/24/2018    CKD stage 4 due to type 2 diabetes mellitus ([address]     Contusion of right chest wall 2/16/2021    COPD (chronic obstructive pulmonary disease) ([address]     Diabetic nephropathy with proteinuria ([address] 2014    DJD (degenerative joint disease) of knee     Dr Leta Briscoe    GERD (gastroesophageal reflux disease)     Hemiparesis, left ([address] 2013    entered Assisted Living ([facility])    Hemodialysis patient [facility])     Hemodialysis-associated hypotension 10/22/2021    History of heart failure     History of seizures     History of type C viral hepatitis     HTN (hypertension)     Hyperlipidemia     Impaired mobility and activities of daily living     Infection of venous access port 2022    Mediastinal lymphadenopathy     Dr Meri Bhatti    Metabolic syndrome     Moderate persistent asthma without complication     Need for extended care facility 2021    Neurogenic urinary incontinence 2013    Neuropathy in diabetes [facility])     Nonrheumatic mitral valve regurgitation 2021    Nonrheumatic tricuspid valve regurgitation 2021    Obesity (BMI 30-39. 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 Louise Donaldson      x1 Dr. Jennifer Manzano, Dr Siddharth Moctezuma  08/10/2021    [facility]    DIAGNOSTIC CARDIAC CATH LAB PROCEDURE  10/02/2019    DIALYSIS CATHETER INSERTION Left 2022    Tunneled Symetrex 15.5F x 23cm hemodialysis catheter inserted by Dr. Jacky Hearn Left 2022    15.5Ox26de replamcent tunneld HD cath by Dr. Polina Olvera, TOTAL ABDOMINAL (CERVIX REMOVED)      one ovary intact, Dr Fay Ortega, menorrhagia    IR THROMBECTOMY PERCUT AVF  2022    IR THROMBECTOMY PERCUT AVF 2022 [facility] PROCEDURE    IR TUNNELED CATHETER PLACEMENT GREATER THAN 5 YEARS  2022    IR TUNNELED CATHETER PLACEMENT GREATER THAN 5 YEARS 6/3/2022 [facility] PROCEDURE    IR TUNNELED CATHETER PLACEMENT GREATER THAN 5 YEARS Left 07/07/2022 7/18/22 Dr. Kurt Parks exchanged to 15.5F x 28 cm.      15.5 fr by 23 cm Symetrex dialysis catheter inserted by Dr Kurt Parks    IR TUNNELED Sheryl Rubio 5 YEARS  07/07/2022    IR TUNNELED CATHETER PLACEMENT GREATER THAN 5 YEARS 7/7/2022 [facility] PROCEDURE    IN TOTAL KNEE ARTHROPLASTY Left 06/21/2018    LEFT KNEE TOTAL KNEE ARTHROPLASTY, SHAYNA, NERVE BLOCK performed by Chandler Perez MD at [facility]    PTCA      TOE AMPUTATION Right     TOTAL KNEE ARTHROPLASTY  05/19/2016    Dr Claudio Srinivasan    [address]ELED [address] Right 07/01/2020    tunneled HD catheter per Dr Martha Maciel Medications on File Prior to Visit   Medication Sig Dispense Refill    albuterol (PROVENTIL) 2.5 MG/0.5ML NEBU nebulizer solution Take 2.5 mg by nebulization every 4 hours as needed for Wheezing or Shortness of Breath      LACTOBACILLUS PO Take 2 capsules by mouth 2 times daily Indications: Nutritional Support      insulin lispro (HUMALOG) 100 UNIT/ML injection vial Inject 12 Units into the skin 3 times daily (before meals) Indications: Type 2 Diabetes      insulin lispro (HUMALOG) 100 UNIT/ML injection vial Inject 0-10 Units into the skin 3 times daily (before meals) Indications: Type 2 Diabetes Inject as per sliding scale: If 151-200=2u; 201-250-4u; 251-300=6u; 301-350=8u; 351-400=10u. Call MD/NP if >400. insulin lispro (HUMALOG) 100 UNIT/ML injection vial Inject 0-4 Units into the skin nightly Indications: Type 2 Diabetes Inject as per sliding scale: If 201-250=1u; 251-300=2u; 301-350=3u; 351-400=4u.   Call MD/NP if >400.      b complex-C-folic acid (NEPHROCAPS) 1 MG capsule Take 1 capsule by mouth daily Indications: Dialysis Dependent Chronic Kidney Failure      pantoprazole (PROTONIX) 20 MG tablet Take 20 mg by mouth daily Indications: Gastroesophageal Reflux Disease      nitroGLYCERIN (NITROSTAT) 0.4 MG SL tablet up to max of 3 total doses. If no relief after 1 dose, call 911. 25 tablet 3    hydrOXYzine HCl (ATARAX) 25 MG tablet Take 25 mg by mouth every 8 hours as needed for Itching      loperamide (IMODIUM A-D) 2 MG tablet Take 2 mg by mouth every 6 hours as needed for Diarrhea Indications: Diarrhea      SURE COMFORT PEN NEEDLES 30G X 8 MM MISC USE AS DIRECTED FIVE TIMES A DAILY 100 each 10    LANTUS SOLOSTAR 100 UNIT/ML injection pen INJECT 55 UNITS SUBCUTANEOUSLY AT BEDTIME (Patient taking differently: Inject 55 Units into the skin nightly Indications: Type 2 Diabetes INJECT 55 UNITS SUBCUTANEOUSLY AT BEDTIME) 15 mL 10    ARIPiprazole (ABILIFY) 5 MG tablet TAKE 1 TABLET BY MOUTH ONCE DAILY (Patient taking differently: Take 5 mg by mouth daily Indications: Paranoid Schizophrenia TAKE 1 TABLET BY MOUTH ONCE DAILY) 30 tablet 10    sertraline (ZOLOFT) 50 MG tablet Take 1 tablet by mouth nightly (Patient taking differently: Take 50 mg by mouth nightly Indications: Depression) 30 tablet 5    atorvastatin (LIPITOR) 40 MG tablet Take 1 tablet by mouth nightly (Patient taking differently: Take 40 mg by mouth nightly Indications: High Amount of Fats in the Blood) 30 tablet 5    midodrine (PROAMATINE) 5 MG tablet Take 1 tablet by mouth one time a day every Tue, Thu, Sat for hypotension. Give 30 mins prior to dialysis. (Patient taking differently: Take 5 mg by mouth three times a week Take 1 tablet by mouth one time a day every Tue, Thu, Sat for hypotension. Give 30 mins prior to dialysis.) 90 tablet 3    Handicap [facility] by Does not apply route Expiration in 5 years. 1 each 0    blood glucose test strips (ONETOUCH VERIO) strip  each 3    OneTouch Delica Lancets 09X MISC  each 3    Blood Glucose Monitoring Suppl (ONETOUCH VERIO) w/Device KIT AS DIRECTED 1 kit 0    aspirin EC 81 MG EC tablet Take 1 tablet by mouth daily (Patient taking differently: Take 81 mg by mouth daily Indications:  Thickening and Hardening of Heart Arteries) 30 tablet 12    blood glucose test strips (FREESTYLE LITE) strip 1 each by Does not apply route 4 times daily (before meals and nightly) As needed. 200 strip 5    Alcohol Swabs (EASY TOUCH ALCOHOL PREP MEDIUM) 70 % PADS USE AS DIRECTED THREE TIMES A  each 3    FreeStyle Lancets MISC Test 4x daily 150 each 3    acetaminophen (TYLENOL) 325 MG tablet Take 2 tablets by mouth every 4 hours as needed for Pain (For mild to moderate pain (Pain 1-6 out of 10 on pain scale)) 120 tablet 0    Blood Glucose Monitoring Suppl (FREESTYLE LITE) CORETTA 1 Device by Does not apply route daily as needed (Diabetes) Use freestyle meter to test blood sugar as needed 1 Device 0     No current facility-administered medications on file prior to visit. Family History   Problem Relation Age of Onset    Cancer Mother 76        survived    Breast Cancer Mother     Hypertension Father     Diabetes Sister     Mental Illness Sister     Colon Cancer Neg Hx        Social History     Socioeconomic History    Marital status:      Spouse name: Not on file    Number of children: 2    Years of education: Not on file    Highest education level: Not on file   Occupational History    Occupation: disabled   Tobacco Use    Smoking status: Never    Smokeless tobacco: Never   Vaping Use    Vaping Use: Never used   Substance and Sexual Activity    Alcohol use: No     Alcohol/week: 0.0 standard drinks    Drug use: No    Sexual activity: Not Currently   Other Topics Concern    Not on file   Social History Narrative    Disabled dt depression and low back pain, [facility] Ger[facility]neal is close by and is her paid caregiver via waiver services    HD via [employer], [id number], PETÄJÄVESI, Sat. Son is in the home and has CP and is total care-and is also cared for by a waiver by [employer].     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 Per daughter, her brother is blind, MRDD, CP multiple health issues. Daughter's  is patient's legal guardian. Patient has hemodialysis Tuesday Thursday and Saturday. Patient's bedroom is on main floor with a half bath. Daughter walks patient upstairs once weekly for full bath. Patient is using her walker in the home. Patient has a hospital bed in the home. Social Determinants of Health     Financial Resource Strain: Low Risk     Difficulty of Paying Living Expenses: Not hard at all   Food Insecurity: No Food Insecurity    Worried About [address] [email or web address] in the Last Year: Never true    [address] [employer] in the Last Year: Never true   Transportation Needs: No Transportation Needs    Lack of Transportation (Medical): No    Lack of Transportation (Non-Medical):  No   Physical Activity: Sufficiently Active    Days of Exercise per Week: 3 days    Minutes of Exercise per Session: 60 min   Stress: Not on file   Social Connections: Not on file   Intimate Partner Violence: Not on file   Housing Stability: Not on file         Lab Results   Component Value Date    LABA1C 7.5 11/16/2022     Lab Results   Component Value Date     09/04/2020       Lab Results   Component Value Date/Time     01/25/2023 12:00 AM    K 3.8 01/25/2023 12:00 AM    K 4.3 07/04/2022 03:07 AM    CL 96 01/25/2023 12:00 AM    CO2 29 01/25/2023 12:00 AM    BUN 25 01/25/2023 12:00 AM    CREATININE 2.8 01/25/2023 12:00 AM    GLUCOSE 180 01/25/2023 12:00 AM    GLUCOSE 90 10/24/2022 10:43 AM    CALCIUM 9.1 01/25/2023 12:00 AM        Lab Results   Component Value Date    CHOL 70 04/09/2022    CHOL 123 01/13/2022    CHOL 101 06/11/2020     Lab Results   Component Value Date    TRIG 92 04/09/2022    TRIG 228 (H) 01/13/2022    TRIG 82 06/11/2020     Lab Results   Component Value Date    HDL 33 (L) 04/09/2022    HDL 39 (L) 01/13/2022    HDL 48 06/11/2020     Lab Results   Component Value Date    LDLCALC 19 04/09/2022    LDLCALC 38 01/13/2022    LDLCALC 37 06/11/2020     Lab Results   Component Value Date    LABVLDL 59.0 05/04/2013    VLDL 43 01/30/2017     Lab Results   Component Value Date    CHOLHDLRATIO 4.32 01/30/2017       Lab Results   Component Value Date    TSH 0.513 06/30/2022       Lab Results   Component Value Date    WBC 10.7 11/12/2022    HGB 11.9 (L) 11/12/2022    HCT 30.7 (L) 11/12/2022    MCV 84.0 11/12/2022     11/12/2022       Please note orders entered on site at facility after discussion with appropriate facility nursing/therapy/ / nutritional staff. Current longstanding medical problems and acute medical issues addressed with staff. Available data and data elements in on site paper chart reviewed and analyzed. Current external consultant notes reviewed in on site chart. Ordered laboratory testing and imaging will be reviewed when available.

## 2023-02-07 ASSESSMENT — ENCOUNTER SYMPTOMS
COUGH: 0
ABDOMINAL DISTENTION: 0
SHORTNESS OF BREATH: 0
BACK PAIN: 1

## 2023-02-07 NOTE — PROGRESS NOTES
Subjective:      Patient ID: Sidney Goddard is a pleasant 70 y.o. female who presents today for:  No chief complaint on file. [facility]    Patient seen today status post left knee revision, CKD stage IV, and DM type II. Patient faithfully going to HD Tuesdays and Thursdays from 1230 to 4:30 PM.  On the 5000 mL fluid restrict restriction which she has been compliant with. No new hemodynamic changes this time. Vital signs are stable 134/80, 18 RR, 7 7 bpm, 98.3 °F, 100% SPO2 on room air. Blood glucose has been stable. Patient taking commendation of mealtime insulin 12 units Humalog as well as sliding scale, +55 units Lantus subcu nightly. Do see Ortho for follow-up on Friday. Participating well in physical therapy albeit with some pain, medicated with current pain meds. No changes made today. Does complain of acute issue of vaginal itching. On inspection per nurse evidence of white curd-like substance, minimal amount, increased itching and redness to labia. We will add a Diflucan order x1. Follow-up for any recurrent symptoms. No further concerns.       Patient Active Problem List   Diagnosis    Atherosclerotic heart disease of native coronary artery with unspecified angina pectoris (HCC)    Schizophrenia, paranoid, chronic    Metabolic syndrome    Vitamin B 12 deficiency    Cerebral microvascular disease    Mixed hyperlipidemia    Other hammer toe (acquired)    Vitamin D insufficiency    Incontinence of urine    Diabetic nephropathy with proteinuria ([address]    Essential (primary) hypertension    History of type C viral hepatitis    Urinary incontinence due to cognitive impairment    History of seizures    Stented coronary artery-plan is to stay on Plavix indefinately per Dr Florentino Edge    Other specified diabetes mellitus with diabetic neuropathy, unspecified ([address]    Controlled type 2 diabetes mellitus with diabetic neuropathy, with long-term current use of insulin (HCC)    Hemiparesis, left ([facility]    Angina, class II ([facility]    Pain, unspecified    Tardive dyskinesia    Shortness of breath    Uncontrolled type 2 diabetes mellitus with hyperglycemia ([facility])    Chronic diastolic congestive heart failure ([facility])    Sleep apnea, unspecified    Pulmonary hypertension, unspecified ([facility])    Class 2 severe obesity with serious comorbidity and body mass index (BMI) of 36.0 to 36.9 in adult [facility])    Edema    Closed supracondylar fracture of right humerus    Other chronic pain    Palliative care patient    Recurrent falls    Renal failure    Difficulty in walking    ESRD (end stage renal disease) on dialysis ([facility])    Weakness    Other seizures ([facility])    Moderate persistent asthma without complication    [id number]    Post PTCA    Falls frequently    Chest wall contusion, left, initial encounter    Headache, unspecified    Paranoid schizophrenia ([facility])    Compression fracture of spine ([facility]    Closed rib fracture    Depression    Chronic obstructive pulmonary disease ([facility])    Critical illness polyneuropathy ([facility]    Multiple closed fractures of ribs of right side    Nonrheumatic mitral valve regurgitation    Nonrheumatic tricuspid valve regurgitation    Need for extended care facility    Chronic pain of both shoulders    Hemodialysis-associated hypotension    Anginal chest pain at rest [facility])    Chest pain    Unstable angina ([facility])    NSTEMI (non-ST elevated myocardial infarction) ([facility])    CKD (chronic kidney disease) stage 4, GFR 15-29 ml/min ([facility])    Hyperkalemia    Impaired mobility and activities of daily living dt polyneuropathy and reccent fall     Dialysis patient [facility])    Unspecified open wound of right upper arm, initial encounter    Multiple closed fractures of right lower extremity and ribs    Closed T11 fracture ([facility])    Encephalopathy acute    MRSA bacteremia    Sepsis due to Enterococcus ([facility])    Local infection due to central venous catheter    DJD (degenerative joint disease), cervical    Closed head injury Sarcopenia    Fall from standing    Septicemia ([address]    Chronic hepatitis C ([address]    Catheter-related bloodstream infection    Enterococcus faecalis infection    Cervical stenosis of spinal canal    Ataxic gait    Lumbar stenosis with neurogenic claudication    Falls infrequently    Infection of venous access port     Past Medical History:   Diagnosis Date    Angina at rest [facility]) 5/24/2020    Anxiety     CAD S/P percutaneous coronary angioplasty 2015, 2018    stents per dr Moriah Nava    CHF (congestive heart failure) ([address]     CKD (chronic kidney disease) stage 4, GFR 15-29 ml/min ([address] 2/24/2018    CKD stage 4 due to type 2 diabetes mellitus ([address]     Contusion of right chest wall 2/16/2021    COPD (chronic obstructive pulmonary disease) ([address]     Diabetic nephropathy with proteinuria ([address] 2014    DJD (degenerative joint disease) of knee     Dr Bob Birch    GERD (gastroesophageal reflux disease)     Hemiparesis, left ([facility] 2013    entered Assisted Living ([facility])    Hemodialysis patient [facility])     Hemodialysis-associated hypotension 10/22/2021    History of heart failure     History of seizures     History of type C viral hepatitis     HTN (hypertension)     Hyperlipidemia     Impaired mobility and activities of daily living     Infection of venous access port 7/29/2022    Mediastinal lymphadenopathy 2013    Dr Dennise Dueñas    Metabolic syndrome     Moderate persistent asthma without complication 6/85/7064    Need for extended care facility 7/7/2021    Neurogenic urinary incontinence 2013    Neuropathy in diabetes ([address]     Nonrheumatic mitral valve regurgitation 7/7/2021    Nonrheumatic tricuspid valve regurgitation 7/7/2021    Obesity (BMI 30-39. 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 Maine Padgett      x1 Dr. Arlin Young, Dr Kerry Min  08/10/2021    [facility]    DIAGNOSTIC CARDIAC CATH LAB PROCEDURE  10/02/2019    DIALYSIS CATHETER INSERTION Left 2022    Tunneled Symetrex 15.5F x 23cm hemodialysis catheter inserted by Dr. Keesha Gage Left 2022    [id number] replamcent tunneld HD cath by Dr. Burt Iraheta, TOTAL ABDOMINAL (CERVIX REMOVED)      one ovary intact, Dr Susy Metcalf, menorrhagia    IR THROMBECTOMY PERCUT AVF  2022    IR THROMBECTOMY PERCUT AVF 2022 [facility] SPECIAL PROCEDURE    IR TUNNELED CATHETER PLACEMENT GREATER THAN 5 YEARS  2022    IR TUNNELED CATHETER PLACEMENT GREATER THAN 5 YEARS 6/3/2022 [facility] PROCEDURE    IR TUNNELED CATHETER PLACEMENT GREATER THAN 5 YEARS Left 2022 Dr. Hallie Kellogg exchanged to 15.5F x 28 cm.      15.5 fr by 23 cm Symetrex dialysis catheter inserted by Dr Hallie Kellogg    IR TUNNELED [address] 5 YEARS  2022    IR TUNNELED CATHETER PLACEMENT GREATER THAN 5 YEARS 2022 [facility] SPECIAL PROCEDURE    AR TOTAL KNEE ARTHROPLASTY Left 2018    LEFT KNEE TOTAL KNEE ARTHROPLASTY, SHAYNA, NERVE BLOCK [facility] [address] [facility]     TOTAL KNEE ARTHROPLASTY  2016    Dr Braxton Slade    [facility] Right 2020    tunneled HD catheter per Dr Lisa Charles History    Marital status:      Spouse name: Not on file    Number of children: 2    Years of education: Not on file    Highest education level: Not on file   Occupational History    Occupation: disabled   Tobacco Use    Smoking status: Never    Smokeless tobacco: Never   Vaping Use    Vaping Use: Never used   Substance and Sexual Activity    Alcohol use: No     Alcohol/week: 0.0 standard drinks    Drug use: No    Sexual activity: Not Currently   Other Topics Concern    Not on file   Social History Narrative    Disabled dt depression and low back pain, [facility] [place] and is her paid caregiver via waiver services    HD via [employer], [id number], PETÄJÄVESI, Sat. Son is in the home and has CP and is total care-and is also cared for by a waiver by Purnima Schwartz. 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 Per daughter, her brother is blind, MRDD, CP multiple health issues. Daughter's  is patient's legal guardian. Patient has hemodialysis Tuesday Thursday and Saturday. Patient's bedroom is on main floor with a half bath. Daughter walks patient upstairs once weekly for full bath. Patient is using her walker in the home. Patient has a hospital bed in the home.      Social Determinants of Health     Financial Resource Strain: Low Risk     Difficulty of Paying Living Expenses: Not hard at all   Food Insecurity: No Food Insecurity    Worried About [address] [employer] in the Last Year: Never true    Ran Out of Food in the Last Year: Never true   Transportation Needs: No Transportation Needs    Lack of Transportation (Medical): No    Lack of Transportation (Non-Medical): No   Physical Activity: Sufficiently Active    Days of Exercise per Week: 3 days    Minutes of Exercise per Session: 60 min   Stress: Not on file   Social Connections: Not on file   Intimate Partner Violence: Not on file   Housing Stability: Not on file     Family History   Problem Relation Age of Onset    Cancer Mother 76        survived    Breast Cancer Mother     Hypertension Father     Diabetes Sister     Mental Illness Sister     Colon Cancer Neg Hx      Allergies   Allergen Reactions    Codeine Hives     hives    Oxycontin [Oxycodone Hcl] Hives           Review of Systems   Constitutional:  Positive for fatigue. Negative for activity change, appetite change and fever. HENT:  Negative for congestion. Respiratory:  Negative for cough and shortness of breath. Cardiovascular:  Negative for chest pain, palpitations and leg swelling. Gastrointestinal:  Negative for abdominal distention. Genitourinary:  Positive for vaginal discharge and vaginal pain (itching / irritation). Negative for difficulty urinating, dysuria, flank pain and frequency. Musculoskeletal:  Positive for arthralgias (Left knee pain secondary to knee replacement), back pain and gait problem (Nonweightbearing). Neurological:  Positive for weakness. Negative for dizziness, light-headedness and headaches. Psychiatric/Behavioral:  Negative for agitation and confusion. The patient is not nervous/anxious. All other systems reviewed and are negative. Objective:   VS: See [address] Reviewed. Physical Exam  Vitals reviewed.    Constitutional:       General: She is not in acute distress. Appearance: Normal appearance. She is obese. She is not ill-appearing. HENT:      Head: Normocephalic and atraumatic. Mouth/Throat:      Mouth: Mucous membranes are moist.      Pharynx: Oropharynx is clear. Eyes:      Extraocular Movements: Extraocular movements intact. Conjunctiva/sclera: Conjunctivae normal.      Pupils: Pupils are equal, round, and reactive to light. Cardiovascular:      Rate and Rhythm: Normal rate and regular rhythm. Pulmonary:      Effort: Pulmonary effort is normal.      Breath sounds: Normal breath sounds. Abdominal:      General: Bowel sounds are normal. There is distension. Palpations: Abdomen is soft. Tenderness: There is no abdominal tenderness. There is no guarding. Genitourinary:     Comments: Moderate erythema to bilateral labia; scant white d/c  Musculoskeletal:         General: No deformity. Right lower leg: Edema present. Left lower leg: Edema present. Comments: Limited ROM to left knee secondary to surgery  Mild BLE edema. Negative calf tenderness   Skin:     General: Skin is warm and dry. Coloration: Skin is not pale. Findings: No erythema. Neurological:      General: No focal deficit present. Mental Status: She is alert and oriented to person, place, and time. Motor: Weakness present. Gait: Gait abnormal.   Psychiatric:         Mood and Affect: Mood normal.         Behavior: Behavior normal.         Thought Content: Thought content normal.       Assessment:       Diagnosis Orders   1. S/P revision of total knee, left        2. CKD (chronic kidney disease) requiring chronic dialysis ([address]        3. Controlled type 2 diabetes mellitus with diabetic neuropathy, with long-term current use of insulin ([address]        4. Vaginal candidiasis              Plan:      1. No new changes. Continue PT/OT. Ortho follow-up on Friday. 2.  Patient compliant with HD. Monitor BMP.   3.  BG controlled at this point time. No new changes to insulin. 4.  Diflucan 150 mg p.o. 1 time. Follow-up for any recurrent symptoms. No follow-ups on file. Nick Godfrey      Please note orders entered on site at facility after discussion with appropriate facility nursing/therapy/ / nutritional staff. Current longstanding medical problems and acute medical issues addressed with staff. Available data and data elements in on site paper chart reviewed and analyzed. Current external consultant notes reviewed in on site chart. Ordered laboratory testing and imaging will be reviewed when available. Side effects, adverse effects of the medication prescribed today, as well as treatment plan and result expectations have been discussed withthe patient who expresses understanding and desires to proceed. I spent a total of 26 minutes on the date of service which included preparing to see the patient, face-to-face patient care, performing a medically appropriate examination, completing clinical documentation, and on counseling/ eductaing the patient and the family. Please note Nuance Dragon PowerMic III software used for dictation of note,  which may contain minor errors due to ambient noise and indiscriminate speech pickup.

## 2023-02-14 ASSESSMENT — ENCOUNTER SYMPTOMS
SHORTNESS OF BREATH: 0
ABDOMINAL DISTENTION: 0
COUGH: 0
BACK PAIN: 1

## 2023-02-14 NOTE — PROGRESS NOTES
PATIENT: Edwina Little : 1960 DOS:2023             [facility]      REASON FOR VISIT: Patient is being seen today for discharge summary. SUBJECTIVE: Patient initially admitted to facility for left knee total revision. Patient did make adequate recovery and is ready to dc home today. This 38-year-old woman was seen in her room. She denies any new pain or discomfort. No new sequelae noted on todays exam. She's comfortable appearing and A&Ox3. She will need HHC / PT at home. She is [facility] currently. Will send with remaining tramadol for pain control, and encourage APAP as well. No further issues noted today. Patient states no new vaginal irritation/itching. Family History   Problem Relation Age of Onset    Cancer Mother 76        survived    Breast Cancer Mother     Hypertension Father     Diabetes Sister     Mental Illness Sister     Colon Cancer Neg Hx          Social History     Socioeconomic History    Marital status:      Spouse name: Not on file    Number of children: 2    Years of education: Not on file    Highest education level: Not on file   Occupational History    Occupation: disabled   Tobacco Use    Smoking status: Never    Smokeless tobacco: Never   Vaping Use    Vaping Use: Never used   Substance and Sexual Activity    Alcohol use: No     Alcohol/week: 0.0 standard drinks    Drug use: No    Sexual activity: Not Currently   Other Topics Concern    Not on file   Social History Narrative    Disabled dt depression and low back pain, lives in [facility] [place] Qureshi is close by and is her paid caregiver via waiver services    HD via [employer], [id number], PETÄJÄVESI, Sat. Son is in the home and has CP and is total care-and is also cared for by a waiver by Aishwarya Dale.     Pt was born in [place], one [facility] a twin sister Denilson Beth, very ill in , [place] 2019    Moved to [facility], , 2 children, one son (disabled with CP) and one daughter Aishwarya Dale Worked at [employer], as a nurse's aide Disabled due to mental illness and back pain    Lived at [facility], was discharged, returned to independent living in 2017 in the daughter's house and has adjusted well    One son and one daughter, live in the same house with patient, Marco Antonio Rockwell pays the rent    [employer]             10/11/2021 [facility] updates; patient lives with her daughter son-in-law and 2 grandchildren and patient's handicapped son. Per daughter, her brother is blind, MRDD, CP multiple health issues. Daughter's  is patient's legal guardian. Patient has hemodialysis Tuesday Thursday and Saturday. Patient's bedroom is on main floor with a half bath. Daughter walks patient upstairs once weekly for full bath. Patient is using her walker in the home. Patient has a hospital bed in the home. Social Determinants of Health     Financial Resource Strain: Low Risk     Difficulty of Paying Living Expenses: Not hard at all   Food Insecurity: No Food Insecurity    Worried About [address] [facility] in the Last Year: Never true    [address]  [employer] in the Last Year: Never true   Transportation Needs: No Transportation Needs    Lack of Transportation (Medical): No    Lack of Transportation (Non-Medical):  No   Physical Activity: Sufficiently Active    Days of Exercise per Week: 3 days    Minutes of Exercise per Session: 60 min   Stress: Not on file   Social Connections: Not on file   Intimate Partner Violence: Not on file   Housing Stability: Not on file         MEDICATIONS:  APAP as 650 mg p.o. every 4 hours as needed, midodrine 5 mg q. afternoon every Tuesday/Thursday/Saturday for hypotension related to hemodialysis, sertraline 50 mg p.o. nightly, loperamide 2 mg p.o. every 4-6 hours as needed, atorvastatin 40 mg p.o. nightly, pantoprazole 20 mg p.o. every morning, Culturelle capsule-2 capsules p.o. every morning and nightly, aspirin 81 mg p.o. every morning, aripiprazole 5 mg p.o. every morning, Nephrocaps 1 mg B complex/C/folic acid-1 capsule p.o. every morning, hydroxyzine 25 mg p.o. every 8 hours as needed, Lantus Solostar 100 unit/mL-inject 55 units SQ nightly, Humalog KwikPen 100 units/ML inject 12 units subcu before every meal, melatonin 3 mg p.o. nightly, tramadol 25 mg p.o. twice daily as needed,. Allergies   Allergen Reactions    Codeine Hives     hives    Oxycontin [Oxycodone Hcl] Hives         Review of Systems   Constitutional:  Negative for activity change, appetite change, fatigue and fever. HENT:  Negative for congestion. Respiratory:  Negative for cough and shortness of breath. Cardiovascular:  Negative for chest pain, palpitations and leg swelling. Gastrointestinal:  Negative for abdominal distention. Genitourinary:  Negative for difficulty urinating, dysuria, flank pain, frequency, vaginal discharge and vaginal pain. Musculoskeletal:  Positive for arthralgias (Left knee pain secondary to knee replacement), back pain and gait problem (Nonweightbearing). Neurological:  Positive for weakness. Negative for dizziness, light-headedness and headaches. Psychiatric/Behavioral:  Negative for agitation and confusion. The patient is not nervous/anxious. All other systems reviewed and are negative. Physical Exam  Vitals reviewed. Constitutional:       General: She is not in acute distress. Appearance: Normal appearance. She is obese. She is not ill-appearing. HENT:      Head: Normocephalic and atraumatic. Mouth/Throat:      Mouth: Mucous membranes are moist.      Pharynx: Oropharynx is clear. Eyes:      Extraocular Movements: Extraocular movements intact. Conjunctiva/sclera: Conjunctivae normal.      Pupils: Pupils are equal, round, and reactive to light. Cardiovascular:      Rate and Rhythm: Normal rate and regular rhythm. Pulmonary:      Effort: Pulmonary effort is normal.      Breath sounds: Normal breath sounds. Abdominal:      General: Bowel sounds are normal. There is distension. Palpations: Abdomen is soft. Tenderness: There is no abdominal tenderness. There is no guarding. Genitourinary:     Comments: Deferred. Denies new sequelae post-tx. Musculoskeletal:         General: No deformity. Right lower leg: Edema present. Left lower leg: Edema present. Skin:     General: Skin is warm and dry. Coloration: Skin is not pale. Findings: No erythema. Neurological:      General: No focal deficit present. Mental Status: She is alert and oriented to person, place, and time. Motor: Weakness present. Gait: Gait abnormal.   Psychiatric:         Mood and Affect: Mood normal.         Behavior: Behavior normal.         Thought Content: Thought content normal.        ASSESSMENT:     Encounter Diagnoses   Name Primary? S/P revision of total knee, left Yes    Chronic pain of left knee     Pulmonary hypertension, unspecified (HCC)     Chronic diastolic congestive heart failure ([facility]         PLAN:  1. Cont pain control via Tramadol and APAP usage. F/u with PCP upon DC. 2.   see #1  3. No new c/o. Cont current medications. Monitor for fluid overload and visit ED if any acute dyspnea. No new evidence of PE.  4. VSS. No new c/o. Cont meds and f/u with PCP. I have reviewed this resident's medication and treatment plan as well as most recent lab work. Resident will be discharged home on 1/31/2023. Pt  will continue to follow-up with PCP as an outpatient as ordered. Pt may have HHC/PT/OT as needed/desired per pt tolerance and need, will likely participate in outpatient PT at [facility]. Patient is to follow-up with her PCP within a week of discharge. Greater than 30 minutes was spent in the discharge planning of this patient. It was a pleasure following with this patient while they resided at [address] [place] [address]  .

## 2023-02-15 ENCOUNTER — APPOINTMENT (OUTPATIENT)
Dept: GENERAL RADIOLOGY | Age: 63
End: 2023-02-15
Payer: MEDICARE

## 2023-02-15 ENCOUNTER — HOSPITAL ENCOUNTER (INPATIENT)
Age: 63
LOS: 3 days | Discharge: HOME | End: 2023-02-18
Attending: STUDENT IN AN ORGANIZED HEALTH CARE EDUCATION/TRAINING PROGRAM | Admitting: INTERNAL MEDICINE
Payer: MEDICARE

## 2023-02-15 DIAGNOSIS — I20.8 ANGINAL CHEST PAIN AT REST (HCC): Primary | ICD-10-CM

## 2023-02-15 DIAGNOSIS — Z86.79 HISTORY OF CORONARY ARTERY DISEASE: ICD-10-CM

## 2023-02-15 DIAGNOSIS — N18.6 END-STAGE RENAL DISEASE ON HEMODIALYSIS (HCC): ICD-10-CM

## 2023-02-15 DIAGNOSIS — Z99.2 END-STAGE RENAL DISEASE ON HEMODIALYSIS (HCC): ICD-10-CM

## 2023-02-15 LAB
ALBUMIN SERPL-MCNC: 3.6 G/DL (ref 3.5–4.6)
ALP BLD-CCNC: 208 U/L (ref 40–130)
ALT SERPL-CCNC: 25 U/L (ref 0–33)
ANION GAP SERPL CALCULATED.3IONS-SCNC: 14 MEQ/L (ref 9–15)
APTT: 28.2 SEC (ref 24.4–36.8)
AST SERPL-CCNC: 29 U/L (ref 0–35)
BASOPHILS ABSOLUTE: 0.1 K/UL (ref 0–0.2)
BASOPHILS RELATIVE PERCENT: 0.5 %
BILIRUB SERPL-MCNC: 0.5 MG/DL (ref 0.2–0.7)
BUN BLDV-MCNC: 23 MG/DL (ref 8–23)
C-REACTIVE PROTEIN, HIGH SENSITIVITY: 17.5 MG/L (ref 0–5)
CALCIUM SERPL-MCNC: 9.7 MG/DL (ref 8.5–9.9)
CHLORIDE BLD-SCNC: 94 MEQ/L (ref 95–107)
CO2: 24 MEQ/L (ref 20–31)
CREAT SERPL-MCNC: 3.76 MG/DL (ref 0.5–0.9)
EOSINOPHILS ABSOLUTE: 0.2 K/UL (ref 0–0.7)
EOSINOPHILS RELATIVE PERCENT: 2.4 %
GFR SERPL CREATININE-BSD FRML MDRD: 12.8 ML/MIN/{1.73_M2}
GLOBULIN: 3.8 G/DL (ref 2.3–3.5)
GLUCOSE BLD-MCNC: 162 MG/DL (ref 70–99)
GLUCOSE BLD-MCNC: 199 MG/DL (ref 70–99)
GLUCOSE BLD-MCNC: 400 MG/DL (ref 70–99)
HCT VFR BLD CALC: 42.5 % (ref 37–47)
HEMOGLOBIN: 14.2 G/DL (ref 12–16)
INFLUENZA A BY PCR: NEGATIVE
INFLUENZA B BY PCR: NEGATIVE
INR BLD: 1.1
LYMPHOCYTES ABSOLUTE: 1.5 K/UL (ref 1–4.8)
LYMPHOCYTES RELATIVE PERCENT: 14.8 %
MAGNESIUM: 2 MG/DL (ref 1.7–2.4)
MCH RBC QN AUTO: 29.8 PG (ref 27–31.3)
MCHC RBC AUTO-ENTMCNC: 33.4 % (ref 33–37)
MCV RBC AUTO: 89.4 FL (ref 79.4–94.8)
MONOCYTES ABSOLUTE: 0.6 K/UL (ref 0.2–0.8)
MONOCYTES RELATIVE PERCENT: 6.3 %
NEUTROPHILS ABSOLUTE: 7.5 K/UL (ref 1.4–6.5)
NEUTROPHILS RELATIVE PERCENT: 76 %
PDW BLD-RTO: 16.5 % (ref 11.5–14.5)
PERFORMED ON: ABNORMAL
PERFORMED ON: ABNORMAL
PLATELET # BLD: 160 K/UL (ref 130–400)
POTASSIUM SERPL-SCNC: 4.1 MEQ/L (ref 3.4–4.9)
PRO-BNP: NORMAL PG/ML
PROTHROMBIN TIME: 14.2 SEC (ref 12.3–14.9)
RBC # BLD: 4.75 M/UL (ref 4.2–5.4)
SARS-COV-2, NAAT: NOT DETECTED
SODIUM BLD-SCNC: 132 MEQ/L (ref 135–144)
TOTAL CK: 46 U/L (ref 0–170)
TOTAL PROTEIN: 7.4 G/DL (ref 6.3–8)
TROPONIN: 0.04 NG/ML (ref 0–0.01)
TROPONIN: 0.05 NG/ML (ref 0–0.01)
TSH SERPL DL<=0.05 MIU/L-ACNC: 1.55 UIU/ML (ref 0.44–3.86)
WBC # BLD: 9.8 K/UL (ref 4.8–10.8)

## 2023-02-15 PROCEDURE — 71045 X-RAY EXAM CHEST 1 VIEW: CPT

## 2023-02-15 PROCEDURE — 93005 ELECTROCARDIOGRAM TRACING: CPT | Performed by: STUDENT IN AN ORGANIZED HEALTH CARE EDUCATION/TRAINING PROGRAM

## 2023-02-15 PROCEDURE — 87502 INFLUENZA DNA AMP PROBE: CPT

## 2023-02-15 PROCEDURE — 96374 THER/PROPH/DIAG INJ IV PUSH: CPT

## 2023-02-15 PROCEDURE — 6370000000 HC RX 637 (ALT 250 FOR IP): Performed by: INTERNAL MEDICINE

## 2023-02-15 PROCEDURE — 82550 ASSAY OF CK (CPK): CPT

## 2023-02-15 PROCEDURE — 36415 COLL VENOUS BLD VENIPUNCTURE: CPT

## 2023-02-15 PROCEDURE — 84443 ASSAY THYROID STIM HORMONE: CPT

## 2023-02-15 PROCEDURE — 6360000002 HC RX W HCPCS: Performed by: INTERNAL MEDICINE

## 2023-02-15 PROCEDURE — 85025 COMPLETE CBC W/AUTO DIFF WBC: CPT

## 2023-02-15 PROCEDURE — 86141 C-REACTIVE PROTEIN HS: CPT

## 2023-02-15 PROCEDURE — 6360000002 HC RX W HCPCS: Performed by: STUDENT IN AN ORGANIZED HEALTH CARE EDUCATION/TRAINING PROGRAM

## 2023-02-15 PROCEDURE — 80053 COMPREHEN METABOLIC PANEL: CPT

## 2023-02-15 PROCEDURE — 87635 SARS-COV-2 COVID-19 AMP PRB: CPT

## 2023-02-15 PROCEDURE — 99223 1ST HOSP IP/OBS HIGH 75: CPT | Performed by: INTERNAL MEDICINE

## 2023-02-15 PROCEDURE — 2060000000 HC ICU INTERMEDIATE R&B

## 2023-02-15 PROCEDURE — 96372 THER/PROPH/DIAG INJ SC/IM: CPT

## 2023-02-15 PROCEDURE — 6370000000 HC RX 637 (ALT 250 FOR IP): Performed by: STUDENT IN AN ORGANIZED HEALTH CARE EDUCATION/TRAINING PROGRAM

## 2023-02-15 PROCEDURE — 83880 ASSAY OF NATRIURETIC PEPTIDE: CPT

## 2023-02-15 PROCEDURE — 99285 EMERGENCY DEPT VISIT HI MDM: CPT

## 2023-02-15 PROCEDURE — 83735 ASSAY OF MAGNESIUM: CPT

## 2023-02-15 PROCEDURE — 84484 ASSAY OF TROPONIN QUANT: CPT

## 2023-02-15 PROCEDURE — 85730 THROMBOPLASTIN TIME PARTIAL: CPT

## 2023-02-15 PROCEDURE — 2580000003 HC RX 258: Performed by: INTERNAL MEDICINE

## 2023-02-15 PROCEDURE — 85610 PROTHROMBIN TIME: CPT

## 2023-02-15 RX ORDER — SODIUM CHLORIDE 0.9 % (FLUSH) 0.9 %
5-40 SYRINGE (ML) INJECTION PRN
Status: DISCONTINUED | OUTPATIENT
Start: 2023-02-15 | End: 2023-02-18 | Stop reason: HOSPADM

## 2023-02-15 RX ORDER — POLYETHYLENE GLYCOL 3350 17 G/17G
17 POWDER, FOR SOLUTION ORAL DAILY PRN
Status: DISCONTINUED | OUTPATIENT
Start: 2023-02-15 | End: 2023-02-18 | Stop reason: HOSPADM

## 2023-02-15 RX ORDER — ENOXAPARIN SODIUM 100 MG/ML
30 INJECTION SUBCUTANEOUS DAILY
Status: DISCONTINUED | OUTPATIENT
Start: 2023-02-15 | End: 2023-02-16 | Stop reason: ALTCHOICE

## 2023-02-15 RX ORDER — SODIUM CHLORIDE 9 MG/ML
INJECTION, SOLUTION INTRAVENOUS PRN
Status: DISCONTINUED | OUTPATIENT
Start: 2023-02-15 | End: 2023-02-18 | Stop reason: HOSPADM

## 2023-02-15 RX ORDER — LANOLIN ALCOHOL/MO/W.PET/CERES
10 CREAM (GRAM) TOPICAL NIGHTLY PRN
COMMUNITY

## 2023-02-15 RX ORDER — TRAMADOL HYDROCHLORIDE 50 MG/1
50 TABLET ORAL EVERY 6 HOURS PRN
COMMUNITY

## 2023-02-15 RX ORDER — ASPIRIN 81 MG/1
81 TABLET, CHEWABLE ORAL ONCE
Status: COMPLETED | OUTPATIENT
Start: 2023-02-15 | End: 2023-02-15

## 2023-02-15 RX ORDER — ASPIRIN 81 MG/1
162 TABLET, CHEWABLE ORAL ONCE
Status: COMPLETED | OUTPATIENT
Start: 2023-02-15 | End: 2023-02-15

## 2023-02-15 RX ORDER — NITROGLYCERIN 0.4 MG/1
0.4 TABLET SUBLINGUAL EVERY 5 MIN PRN
Status: DISCONTINUED | OUTPATIENT
Start: 2023-02-15 | End: 2023-02-18 | Stop reason: HOSPADM

## 2023-02-15 RX ORDER — MORPHINE SULFATE 4 MG/ML
4 INJECTION, SOLUTION INTRAMUSCULAR; INTRAVENOUS ONCE
Status: COMPLETED | OUTPATIENT
Start: 2023-02-15 | End: 2023-02-15

## 2023-02-15 RX ORDER — ACETAMINOPHEN 325 MG/1
650 TABLET ORAL EVERY 6 HOURS PRN
Status: DISCONTINUED | OUTPATIENT
Start: 2023-02-15 | End: 2023-02-18 | Stop reason: HOSPADM

## 2023-02-15 RX ORDER — RANOLAZINE 500 MG/1
500 TABLET, EXTENDED RELEASE ORAL 2 TIMES DAILY
Status: DISCONTINUED | OUTPATIENT
Start: 2023-02-15 | End: 2023-02-18 | Stop reason: HOSPADM

## 2023-02-15 RX ORDER — ONDANSETRON 2 MG/ML
4 INJECTION INTRAMUSCULAR; INTRAVENOUS EVERY 6 HOURS PRN
Status: DISCONTINUED | OUTPATIENT
Start: 2023-02-15 | End: 2023-02-15

## 2023-02-15 RX ORDER — ASPIRIN 81 MG/1
81 TABLET ORAL ONCE
Status: COMPLETED | OUTPATIENT
Start: 2023-02-15 | End: 2023-02-15

## 2023-02-15 RX ORDER — SODIUM CHLORIDE 0.9 % (FLUSH) 0.9 %
5-40 SYRINGE (ML) INJECTION EVERY 12 HOURS SCHEDULED
Status: DISCONTINUED | OUTPATIENT
Start: 2023-02-15 | End: 2023-02-18 | Stop reason: HOSPADM

## 2023-02-15 RX ORDER — ONDANSETRON 2 MG/ML
4 INJECTION INTRAMUSCULAR; INTRAVENOUS EVERY 6 HOURS PRN
Status: DISCONTINUED | OUTPATIENT
Start: 2023-02-15 | End: 2023-02-18 | Stop reason: HOSPADM

## 2023-02-15 RX ORDER — PREDNISONE 50 MG/1
50 TABLET ORAL ONCE
Status: COMPLETED | OUTPATIENT
Start: 2023-02-15 | End: 2023-02-15

## 2023-02-15 RX ORDER — INSULIN LISPRO 100 [IU]/ML
9 INJECTION, SOLUTION INTRAVENOUS; SUBCUTANEOUS ONCE
Status: COMPLETED | OUTPATIENT
Start: 2023-02-15 | End: 2023-02-15

## 2023-02-15 RX ORDER — DIPHENHYDRAMINE HCL 25 MG
50 TABLET ORAL ONCE
Status: COMPLETED | OUTPATIENT
Start: 2023-02-15 | End: 2023-02-15

## 2023-02-15 RX ORDER — NITROGLYCERIN 0.4 MG/1
0.4 TABLET SUBLINGUAL ONCE
Status: COMPLETED | OUTPATIENT
Start: 2023-02-15 | End: 2023-02-15

## 2023-02-15 RX ORDER — ACETAMINOPHEN 650 MG/1
650 SUPPOSITORY RECTAL EVERY 6 HOURS PRN
Status: DISCONTINUED | OUTPATIENT
Start: 2023-02-15 | End: 2023-02-18 | Stop reason: HOSPADM

## 2023-02-15 RX ORDER — ASPIRIN 81 MG/1
81 TABLET ORAL DAILY
Status: DISCONTINUED | OUTPATIENT
Start: 2023-02-16 | End: 2023-02-18 | Stop reason: HOSPADM

## 2023-02-15 RX ORDER — ONDANSETRON 4 MG/1
4 TABLET, ORALLY DISINTEGRATING ORAL EVERY 8 HOURS PRN
Status: DISCONTINUED | OUTPATIENT
Start: 2023-02-15 | End: 2023-02-18 | Stop reason: HOSPADM

## 2023-02-15 RX ADMIN — HYDROCORTISONE SODIUM SUCCINATE 200 MG: 100 INJECTION, POWDER, FOR SOLUTION INTRAMUSCULAR; INTRAVENOUS at 21:39

## 2023-02-15 RX ADMIN — Medication 10 ML: at 21:40

## 2023-02-15 RX ADMIN — ASPIRIN 162 MG: 81 TABLET, CHEWABLE ORAL at 11:43

## 2023-02-15 RX ADMIN — DIPHENHYDRAMINE HCL 50 MG: 25 TABLET ORAL at 21:39

## 2023-02-15 RX ADMIN — TICAGRELOR 180 MG: 90 TABLET ORAL at 12:54

## 2023-02-15 RX ADMIN — ENOXAPARIN SODIUM 30 MG: 100 INJECTION SUBCUTANEOUS at 14:16

## 2023-02-15 RX ADMIN — MORPHINE SULFATE 4 MG: 4 INJECTION, SOLUTION INTRAMUSCULAR; INTRAVENOUS at 12:25

## 2023-02-15 RX ADMIN — ASPIRIN 81 MG: 81 TABLET, COATED ORAL at 21:39

## 2023-02-15 RX ADMIN — PREDNISONE 50 MG: 50 TABLET ORAL at 21:39

## 2023-02-15 RX ADMIN — INSULIN LISPRO 9 UNITS: 100 INJECTION, SOLUTION INTRAVENOUS; SUBCUTANEOUS at 12:30

## 2023-02-15 RX ADMIN — NITROGLYCERIN 0.4 MG: 0.4 TABLET SUBLINGUAL at 11:44

## 2023-02-15 RX ADMIN — ACETAMINOPHEN 650 MG: 325 TABLET ORAL at 21:39

## 2023-02-15 RX ADMIN — ASPIRIN 81 MG: 81 TABLET, CHEWABLE ORAL at 11:43

## 2023-02-15 RX ADMIN — METOPROLOL TARTRATE 25 MG: 25 TABLET, FILM COATED ORAL at 21:38

## 2023-02-15 RX ADMIN — RANOLAZINE 500 MG: 500 TABLET, FILM COATED, EXTENDED RELEASE ORAL at 21:39

## 2023-02-15 ASSESSMENT — PAIN DESCRIPTION - LOCATION
LOCATION: CHEST
LOCATION: CHEST

## 2023-02-15 ASSESSMENT — ENCOUNTER SYMPTOMS
BACK PAIN: 0
DIARRHEA: 0
COUGH: 0
EYES NEGATIVE: 1
ABDOMINAL PAIN: 0
SHORTNESS OF BREATH: 1
TROUBLE SWALLOWING: 0
VOMITING: 0
WHEEZING: 0
CHEST TIGHTNESS: 0
GASTROINTESTINAL NEGATIVE: 1
NAUSEA: 0
ALLERGIC/IMMUNOLOGIC NEGATIVE: 1
SINUS PRESSURE: 0

## 2023-02-15 ASSESSMENT — HEART SCORE: ECG: 1

## 2023-02-15 ASSESSMENT — PAIN SCALES - GENERAL
PAINLEVEL_OUTOF10: 6
PAINLEVEL_OUTOF10: 7
PAINLEVEL_OUTOF10: 3
PAINLEVEL_OUTOF10: 4
PAINLEVEL_OUTOF10: 5
PAINLEVEL_OUTOF10: 3

## 2023-02-15 ASSESSMENT — PAIN DESCRIPTION - DESCRIPTORS: DESCRIPTORS: PRESSURE

## 2023-02-15 ASSESSMENT — PAIN DESCRIPTION - ORIENTATION: ORIENTATION: RIGHT

## 2023-02-15 NOTE — ED NOTES
Glucose 400 per Lab, Dr. Nunes Cease aware at this time  Patient states chest pain is still present with a 5/10 pain Dr. Nuens Cea aware and putting in orders       Venecia Lovell RN  02/15/23 [phone]

## 2023-02-15 NOTE — ED NOTES
Pt constantly asking for food. Pt aware may have heart cath today and echo. pt a/o x 3 skin pink w/d resp non labored. Pt reports 3/10 cp but has had continuous pain since arrival.     Yaquelin Pearl.  Chadd Griffiths  02/15/23 [id number]

## 2023-02-15 NOTE — ED PROVIDER NOTES
[address] [facility] ED  eMERGENCY dEPARTMENT eNCOUnter      Pt Name: Jenna Medina  MRN: [id number]  [place] 1960  Date of evaluation: 2/15/2023  Provider: Joshua Warner [place]       Chief Complaint   Patient presents with    Chest Pain     2 days    Shortness of Breath     Since yesterday         HISTORY OF PRESENT ILLNESS   (Location/Symptom, Timing/Onset,Context/Setting, Quality, Duration, Modifying Factors, Severity)  Note limiting factors. Jenna Medina is a 70 y.o. female who presents to the emergency department with c/o pressure. Radiates to left shoulder. Shortness of breath on exertion. Patient gets some relief with nitro was taking with increased frequency. Patient states she had open heart surgery a year ago when she had 100% blockage of her LAD. Patient denies any fever or chills. Patient denies cough. Patient denies any nausea, vomiting or diarrhea. Patient denies any abdominal pain. The pain is described as a pressure. The history is provided by the patient. NursingNotes were reviewed. REVIEW OF SYSTEMS    (2-9 systems for level 4, 10 or more for level 5)     Review of Systems   Constitutional:  Negative for activity change, appetite change, chills, fever and unexpected weight change. HENT:  Negative for drooling, ear pain, nosebleeds, sinus pressure and trouble swallowing. Respiratory:  Positive for shortness of breath (Exertion). Negative for cough and chest tightness. Cardiovascular:  Positive for chest pain. Negative for leg swelling. Gastrointestinal:  Negative for abdominal pain, diarrhea and vomiting. Endocrine: Negative for polydipsia and polyphagia. Genitourinary:  Negative for dysuria, flank pain and frequency. Musculoskeletal:  Negative for back pain and myalgias. Skin:  Negative for pallor and rash. Neurological:  Negative for syncope, weakness and headaches. Hematological:  Does not bruise/bleed easily.    All other systems reviewed and are negative. Except as noted above the remainder of the review of systems was reviewed and negative. PAST MEDICAL HISTORY     Past Medical History:   Diagnosis Date    Angina at rest [facility]) 5/24/2020    Anxiety     CAD S/P percutaneous coronary angioplasty 2015, 2018    stents per dr Tawanda Laboy    CHF (congestive heart failure) ([facility]     CKD (chronic kidney disease) stage 4, GFR 15-29 ml/min ([facility] [facility] 2/24/2018    CKD stage 4 due to type 2 diabetes mellitus ([address]     Contusion of right chest wall 2/16/2021    COPD (chronic obstructive pulmonary disease) ([facility] [facility]     Diabetic nephropathy with proteinuria ([facility] [facility] 2014    DJD (degenerative joint disease) of knee     Dr Kacey Cisse    GERD (gastroesophageal reflux disease)     Hemiparesis, left ([facility] 2013    entered Assisted Living ([facility])    Hemodialysis patient [facility])     Hemodialysis-associated hypotension 10/22/2021    History of heart failure     History of seizures     History of type C viral hepatitis     HTN (hypertension)     Hyperlipidemia     Impaired mobility and activities of daily living     Infection of venous access port 7/29/2022    Mediastinal lymphadenopathy 2013    Dr Frank Jamison    Metabolic syndrome     Moderate persistent asthma without complication 0/46/5678    Need for extended care facility 7/7/2021    Neurogenic urinary incontinence 2013    Neuropathy in diabetes ([facility]     Nonrheumatic mitral valve regurgitation 7/7/2021    Nonrheumatic tricuspid valve regurgitation 7/7/2021    Obesity (BMI 30-39. 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 Bg Long      x1 Dr. Harpreet Lange, Dr Burrell Matters  08/10/2021    [facility]    DIAGNOSTIC CARDIAC CATH LAB PROCEDURE  10/02/2019    DIALYSIS CATHETER INSERTION Left 2022    Tunneled Symetrex 15.5F x 23cm hemodialysis catheter inserted by Dr. Umm Darling Left 2022    15.5Ko84ul replamcent tunneld HD cath by Dr. Tao Yancey, TOTAL ABDOMINAL (CERVIX REMOVED)      one ovary intact, Dr Cassie Nathan, menorrhagia    IR THROMBECTOMY PERCUT AVF  2022    IR THROMBECTOMY PERCUT AVF 2022 [facility] PROCEDURE    IR TUNNELED CATHETER PLACEMENT GREATER THAN 5 YEARS  2022    IR TUNNELED CATHETER PLACEMENT GREATER THAN 5 YEARS 6/3/2022 [facility] PROCEDURE    IR TUNNELED CATHETER PLACEMENT GREATER THAN 5 YEARS Left 2022 Dr. Eder Felder exchanged to 15.5F x 28 cm.      15.5 fr by 23 cm Symetrex dialysis catheter inserted by Dr Eder Felder    IR TUNNELED Natoni Grandchild 5 YEARS  2022    IR TUNNELED CATHETER PLACEMENT GREATER THAN 5 YEARS 2022 [facility] SPECIAL PROCEDURE    KS TOTAL KNEE ARTHROPLASTY Left 2018    LEFT KNEE TOTAL KNEE ARTHROPLASTY, SHAYNA, NERVE BLOCK [facility]     TOTAL KNEE ARTHROPLASTY  2016    Dr Jax Ryan    TUNNELED [address] Right 2020    tunneled HD catheter per Dr Claudine Macdonald       Previous Medications    ACETAMINOPHEN (TYLENOL) 325 MG TABLET Take 2 tablets by mouth every 4 hours as needed for Pain (For mild to moderate pain (Pain 1-6 out of 10 on pain scale))    ALBUTEROL (PROVENTIL) 2.5 MG/0.5ML NEBU NEBULIZER SOLUTION    Take 2.5 mg by nebulization every 4 hours as needed for Wheezing or Shortness of Breath    ALCOHOL SWABS (EASY TOUCH ALCOHOL PREP MEDIUM) 70 % PADS    USE AS DIRECTED THREE TIMES A DAY    ARIPIPRAZOLE (ABILIFY) 5 MG TABLET    TAKE 1 TABLET BY MOUTH ONCE DAILY    ASPIRIN EC 81 MG EC TABLET    Take 1 tablet by mouth daily    ATORVASTATIN (LIPITOR) 40 MG TABLET    Take 1 tablet by mouth nightly    B COMPLEX-C-FOLIC ACID (NEPHROCAPS) 1 MG CAPSULE    Take 1 capsule by mouth daily Indications: Dialysis Dependent Chronic Kidney Failure    BLOOD GLUCOSE MONITORING SUPPL (FREESTYLE LITE) CORETTA    1 Device by Does not apply route daily as needed (Diabetes) Use freestyle meter to test blood sugar as needed    BLOOD GLUCOSE MONITORING SUPPL (ONETOUCH VERIO) W/DEVICE KIT    AS DIRECTED    BLOOD GLUCOSE TEST STRIPS (FREESTYLE LITE) STRIP    1 each by Does not apply route 4 times daily (before meals and nightly) As needed. BLOOD GLUCOSE TEST STRIPS (ONETOUCH VERIO) STRIP    QID    FREESTYLE LANCETS MISC    Test 4x daily    HANDICAP PLACARD MISC    by Does not apply route Expiration in 5 years. HYDROXYZINE HCL (ATARAX) 25 MG TABLET    Take 25 mg by mouth every 8 hours as needed for Itching    INSULIN LISPRO (HUMALOG) 100 UNIT/ML INJECTION VIAL    Inject 12 Units into the skin 3 times daily (before meals) Indications: Type 2 Diabetes    INSULIN LISPRO (HUMALOG) 100 UNIT/ML INJECTION VIAL    Inject 0-10 Units into the skin 3 times daily (before meals) Indications: Type 2 Diabetes Inject as per sliding scale: If 151-200=2u; 201-250-4u; 251-300=6u; 301-350=8u; 351-400=10u. Call MD/NP if >400. INSULIN LISPRO (HUMALOG) 100 UNIT/ML INJECTION VIAL    Inject 0-4 Units into the skin nightly Indications: Type 2 Diabetes Inject as per sliding scale:  If 201-250=1u; 251-300=2u; 301-350=3u; 351-400=4u. Call MD/NP if >400. LACTOBACILLUS PO    Take 2 capsules by mouth 2 times daily Indications: Nutritional Support    LANTUS SOLOSTAR 100 UNIT/ML INJECTION PEN    INJECT 55 UNITS SUBCUTANEOUSLY AT BEDTIME    LOPERAMIDE (IMODIUM A-D) 2 MG TABLET    Take 2 mg by mouth every 6 hours as needed for Diarrhea Indications: Diarrhea    MIDODRINE (PROAMATINE) 5 MG TABLET    Take 1 tablet by mouth one time a day every Tue, Thu, Sat for hypotension. Give 30 mins prior to dialysis. NITROGLYCERIN (NITROSTAT) 0.4 MG SL TABLET    up to max of 3 total doses. If no relief after 1 dose, call 911. ONETOUCH DELICA LANCETS 70M MISC    QID    PANTOPRAZOLE (PROTONIX) 20 MG TABLET    Take 20 mg by mouth daily Indications: Gastroesophageal Reflux Disease    SERTRALINE (ZOLOFT) 50 MG TABLET    Take 1 tablet by mouth nightly    SURE COMFORT PEN NEEDLES 30G X 8 MM MISC    USE AS DIRECTED FIVE TIMES A DAILY       ALLERGIES     Codeine and Oxycontin [oxycodone hcl]    FAMILY HISTORY       Family History   Problem Relation Age of Onset    Cancer Mother 76        survived    Breast Cancer Mother     Hypertension Father     Diabetes Sister     Mental Illness Sister     Colon Cancer Neg Hx           SOCIAL HISTORY       Social History     Socioeconomic History    Marital status:     Number of children: 2   Occupational History    Occupation: disabled   Tobacco Use    Smoking status: Never    Smokeless tobacco: Never   Vaping Use    Vaping Use: Never used   Substance and Sexual Activity    Alcohol use: No     Alcohol/week: 0.0 standard drinks    Drug use: No    Sexual activity: Not Currently   Social History Narrative    Disabled dt depression and low back pain, lives in [facility] [place], [place] Aubree Cortes is close by and is her paid caregiver via waiver services    HD via [employer], [id number], [facility], Sat.     Son is in the home and has CP and is total care-and is also cared for by a waiver by Emma Hankins. 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 updates; patient lives with her daughter son-in-law and 2 grandchildren and patient's handicapped son. Per daughter, her brother is blind, MRDD, CP multiple health issues. Daughter's  is patient's legal guardian. Patient has hemodialysis Tuesday Thursday and Saturday. Patient's bedroom is on main floor with a half bath. Daughter walks patient upstairs once weekly for full bath. Patient is using her walker in the home. Patient has a hospital bed in the home. Social Determinants of Health     Financial Resource Strain: Low Risk     Difficulty of Paying Living Expenses: Not hard at all   Food Insecurity: No Food Insecurity    Worried About [address] in the Last Year: Never true    [facility] in the Last Year: Never true   Transportation Needs: No Transportation Needs    Lack of Transportation (Medical): No    Lack of Transportation (Non-Medical):  No   Physical Activity: Sufficiently Active    Days of Exercise per Week: 3 days    Minutes of Exercise per Session: 60 min       SCREENINGS      @FLOW([id number])@      PHYSICAL EXAM    (up to 7 for level 4, 8 or more for level 5)     ED Triage Vitals   BP Temp Temp Source Heart Rate Resp SpO2 Height Weight   02/15/23 1051 02/15/23 1048 02/15/23 1048 02/15/23 1048 02/15/23 1048 02/15/23 1048 02/15/23 1048 02/15/23 1048   108/62 97.8 °F (36.6 °C) Oral 95 18 94 % 5' 7\" (1.702 m) 192 lb (87.1 kg)       Physical Exam  Vitals and nursing note reviewed. Constitutional:       General: She is awake. She is in acute distress. Appearance: Normal appearance. She is well-developed and normal weight. She is not ill-appearing, toxic-appearing or diaphoretic. Comments: No photophobia. No phonophobia. HENT:      Head: Normocephalic and atraumatic. No Gee's sign. Right Ear: External ear normal.      Left Ear: External ear normal.      Nose: Nose normal. No congestion or rhinorrhea. Mouth/Throat:      Mouth: Mucous membranes are moist.      Pharynx: Oropharynx is clear. No oropharyngeal exudate or posterior oropharyngeal erythema. Eyes:      General: No scleral icterus. Right eye: No foreign body or discharge. Left eye: No discharge. Extraocular Movements: Extraocular movements intact. Conjunctiva/sclera: Conjunctivae normal.      Left eye: No exudate. Pupils: Pupils are equal, round, and reactive to light. Neck:      Vascular: No JVD. Trachea: No tracheal deviation. Comments: No meningismus. Cardiovascular:      Rate and Rhythm: Normal rate and regular rhythm. Pulses: Normal pulses. Heart sounds: Normal heart sounds. Heart sounds not distant. No murmur heard. No friction rub. No gallop. Pulmonary:      Effort: Pulmonary effort is normal. No respiratory distress. Breath sounds: Normal breath sounds. No stridor. No wheezing, rhonchi or rales. Chest:      Chest wall: No tenderness. Abdominal:      General: Abdomen is flat. Bowel sounds are normal. There is no distension. Palpations: Abdomen is soft. There is no mass. Tenderness: There is no abdominal tenderness. There is no right CVA tenderness, left CVA tenderness, guarding or rebound. Hernia: No hernia is present. Musculoskeletal:         General: No swelling, tenderness, deformity or signs of injury. Normal range of motion. Cervical back: Normal range of motion and neck supple. No rigidity. Lymphadenopathy:      Head:      Right side of head: No submental adenopathy. Left side of head: No submental adenopathy. Skin:     General: Skin is warm and dry. Capillary Refill: Capillary refill takes less than 2 seconds. Coloration: Skin is not jaundiced or pale. Findings: No bruising, erythema, lesion or rash. Neurological:      General: No focal deficit present. Mental Status: She is alert and oriented to person, place, and time. Mental status is at baseline. Cranial Nerves: No cranial nerve deficit. Sensory: No sensory deficit. Motor: No weakness. Coordination: Coordination normal.      Deep Tendon Reflexes: Reflexes are normal and symmetric. Psychiatric:         Mood and Affect: Mood normal.         Behavior: Behavior normal. Behavior is cooperative. Thought Content: Thought content normal.         Judgment: Judgment normal.       DIAGNOSTIC RESULTS     EKG: All EKG's are interpreted by the Emergency Department Physician who either signs or Co-signsthis chart in the absence of a cardiologist.    EKG (ED attending read: This rhythm at 85 bpm, first-degree AV block, T wave inversions in aVR, aVL, V1 and V2. There is a right bundle branch block. The QT intervals 444 ms. There is ST depression in leads II and III. The QT interval is 444 ms. There are no PVCs. RADIOLOGY:   Non-plain filmimages such as CT, Ultrasound and MRI are read by the radiologist. Plain radiographic images are visualized and preliminarily interpreted by the emergency physician with the below findings:        Interpretation per the Radiologist below, if available at the time ofthis note:    XR CHEST PORTABLE   Final Result   Mild CHF.                ED BEDSIDE ULTRASOUND:   Performed by ED Physician - none    LABS:  Labs Reviewed   CBC WITH AUTO DIFFERENTIAL - Abnormal; Notable for the following components: Result Value    RDW 16.5 (*)     Neutrophils Absolute 7.5 (*)     All other components within normal limits   COMPREHENSIVE METABOLIC PANEL - Abnormal; Notable for the following components:    Sodium 132 (*)     Chloride 94 (*)     Glucose 400 (*)     Creatinine 3.76 (*)     Est, Glom Filt Rate 12.8 (*)     Alkaline Phosphatase 208 (*)     Globulin 3.8 (*)     All other components within normal limits    Narrative:     CALL  [facility]  [facility] tel. [phone],  GLU results called to and read back by Yamila Owens, 02/15/2023 12:21, by [facility]   HIGH SENSITIVITY CRP - Abnormal; Notable for the following components:    CRP High Sensitivity 17.5 (*)     All other components within normal limits   TROPONIN - Abnormal; Notable for the following components:    Troponin 0.043 (*)     All other components within normal limits    Narrative:     CALL  [facility]  [facility] tel. [phone],  Troponin results called to and read back by Dr. Mattie Orourke, 02/15/2023 12:38, by  [facility]  GLU results called to and read back by Yamila Owens, 02/15/2023 12:21, by [facility]   COVID-19, RAPID   RAPID INFLUENZA A/B ANTIGENS   APTT   BRAIN NATRIURETIC PEPTIDE    Narrative:     Melissa LARIOS tel. [phone],  Troponin results called to and read back by Dr. Mattie Orourke, 02/15/2023 12:38, by  [facility]  GLU results called to and read back by Yamila Owens, 02/15/2023 12:21, by [facility]   CK    Narrative:     Melissa LARIOS tel. [phone],  GLU results called to and read back by Yamila Owens, 02/15/2023 12:21, by [address] [place] [address]    Narrative:     Melissa LARIOS tel. [phone],  GLU results called to and read back by Yamila Owens, 02/15/2023 12:21, by [facility]   PROTIME-INR   TSH    Narrative:     Melissa LAROIS tel. [phone],  Troponin results called to and read back by Dr. Mattie Orourke, 02/15/2023 12:38, by  [facility]  GLU results called to and read back by Yamila Owens, 02/15/2023 12:21, by [facility]   TROPONIN   TROPONIN   TROPONIN   TROPONIN       All other labs were within normal range or not returned as of this dictation. EMERGENCY DEPARTMENT COURSE and DIFFERENTIAL DIAGNOSIS/MDM:   Vitals:    Vitals:    02/15/23 1400 02/15/23 1415 02/15/23 1417 02/15/23 1430   BP: 127/60   123/61   Pulse: 76 73 73 74   Resp: 19 14 11 18   Temp:       TempSrc:       SpO2:       Weight:       Height:           [place] saw the patient in the emergency room. Cardiology consult was obtained that he viewed the EKG. Patient be given aspirin, 1 nitro, and he would like to wait for the platelets to come back in the coagulation to come back before deciding on heparin or Brilinta. The plan will be to admit the patient into the hospital for possible catheterization. MDM    Heart score 9. Improvement of chest pain with nitro and morphine. History of stents. History of bypass. Dr. Kenneth Degroot saw the patient in the ER. Due to the multitude of medical problems including uncontrolled diabetes and end-stage renal disease on hemodialysis he would like admitted to the hospitalist.  The ER physician spoke with the hospitalist who accepted the patient. Dr. Negron accepted patient at [phone]. CONSULTS:  IP CONSULT TO CARDIOLOGY  IP CONSULT TO HOSPITALIST  IP CONSULT TO NEPHROLOGY  IP CONSULT TO CARDIOLOGY    PROCEDURES:  Unless otherwise noted below, none     Procedures    FINAL IMPRESSION      1. Anginal chest pain at rest [facility])    2. End-stage renal disease on hemodialysis ([facility]    3. History of coronary artery disease          DISPOSITION/PLAN   DISPOSITION Admitted 02/15/2023 02:00:44 PM      PATIENT REFERRED TO:  No follow-up provider specified.     DISCHARGE MEDICATIONS:  New Prescriptions    No medications on file          (Please note that portions of this note were completed with a voice recognition program.  Efforts were made to edit the dictations but occasionally words are mis-transcribed.)    Mary Curtis DO (electronically signed)  Attending Emergency Physician          Mary Curtis DO  02/15/23 [address]

## 2023-02-15 NOTE — H&P
Hospital Medicine  History and Physical    Patient:  Kerry Johnson  MRN: [id number]    CHIEF COMPLAINT:    Chief Complaint   Patient presents with    Chest Pain     2 days    Shortness of Breath     Since yesterday       History Obtained From:  Patient, EMR  Primary Care Physician: Ashutosh Jaquez MD    HISTORY OF PRESENT ILLNESS:   The patient is a 70 y.o. female who presents with chest pain. Typical anginal symptoms that are worse with exertion, decreased with rest.  Duration of symptoms 1 days. Timing: Gradually worsening. Not associate with fever    Past Medical History:      Diagnosis Date    Angina at rest [facility]) 5/24/2020    Anxiety     CAD S/P percutaneous coronary angioplasty 2015, 2018    stents per dr Bobby Preston    CHF (congestive heart failure) ([address]     CKD (chronic kidney disease) stage 4, GFR 15-29 ml/min ([address] 2/24/2018    CKD stage 4 due to type 2 diabetes mellitus ([address]     Contusion of right chest wall 2/16/2021    COPD (chronic obstructive pulmonary disease) ([address]     Diabetic nephropathy with proteinuria ([address] 2014    DJD (degenerative joint disease) of knee     Dr Sharyn Robles    GERD (gastroesophageal reflux disease)     Hemiparesis, left ([address] 2013    entered Assisted Living ([facility])    Hemodialysis patient [facility])     Hemodialysis-associated hypotension 10/22/2021    History of heart failure     History of seizures     History of type C viral hepatitis     HTN (hypertension)     Hyperlipidemia     Impaired mobility and activities of daily living     Infection of venous access port 7/29/2022    Mediastinal lymphadenopathy 2013    Dr Kvng Mcmillan    Metabolic syndrome     Moderate persistent asthma without complication 1/82/1128    Need for extended care facility 7/7/2021    Neurogenic urinary incontinence 2013    Neuropathy in diabetes ([address]     Nonrheumatic mitral valve regurgitation 7/7/2021    Nonrheumatic tricuspid valve regurgitation 7/7/2021    Obesity (BMI 30-39. 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 Ben Nielson      x1 Dr. Nelson River, Dr Jane Suárez  08/10/2021    [facility]    DIAGNOSTIC CARDIAC CATH LAB PROCEDURE  10/02/2019    DIALYSIS CATHETER INSERTION Left 2022    Tunneled Symetrex 15.5F x 23cm hemodialysis catheter inserted by Dr. Katelyn Hooker Left 2022    [id number] replamcent tunneld HD cath by Dr. Joselin Rosenthal, TOTAL ABDOMINAL (CERVIX REMOVED)      one ovary intact, Dr Jesus Lauren, menorrhagia    IR THROMBECTOMY PERCUT AVF  2022    IR THROMBECTOMY PERCUT AVF 2022 [facility] PROCEDURE    IR TUNNELED CATHETER PLACEMENT GREATER THAN 5 YEARS  2022    IR TUNNELED CATHETER PLACEMENT GREATER THAN 5 YEARS 6/3/2022 [facility] PROCED[facility]    IR TUNNELED CATHETER PLACEMENT GREATER THAN 5 YEARS Left 2022 Dr. Jennifer Gonzalez exchanged to 15.5F x 28 cm.      15.5 fr by 23 cm Symetrex dialysis catheter inserted by Dr Jennifer Gonzalez    IR TUNNELED [address] 5 YEARS  2022    IR TUNNELED CATHETER PLACEMENT GREATER THAN 5 YEARS 2022 [facility] PROCEDURE    IA TOTAL KNEE ARTHROPLASTY Left 2018    LEFT KNEE TOTAL KNEE ARTHROPLASTY, SHAYNA, NERVE BLOCK performed by Mayela Amezquita MD at [address] [facility] Right     TOTAL KNEE ARTHROPLASTY  05/19/2016    Dr Jolie Bautista    TUNNELED [address] Right 07/01/2020    tunneled HD catheter per Dr Surya Louise       Medications Prior to Admission:    Prior to Admission medications    Medication Sig Start Date End Date Taking? Authorizing Provider   albuterol (PROVENTIL) 2.5 MG/0.5ML NEBU nebulizer solution Take 2.5 mg by nebulization every 4 hours as needed for Wheezing or Shortness of Breath    Historical Provider, MD   LACTOBACILLUS PO Take 2 capsules by mouth 2 times daily Indications: Nutritional Support    Historical Provider, MD   insulin lispro (HUMALOG) 100 UNIT/ML injection vial Inject 12 Units into the skin 3 times daily (before meals) Indications: Type 2 Diabetes    Historical Provider, MD   insulin lispro (HUMALOG) 100 UNIT/ML injection vial Inject 0-10 Units into the skin 3 times daily (before meals) Indications: Type 2 Diabetes Inject as per sliding scale: If 151-200=2u; 201-250-4u; 251-300=6u; 301-350=8u; 351-400=10u. Call MD/NP if >400. Historical Provider, MD   insulin lispro (HUMALOG) 100 UNIT/ML injection vial Inject 0-4 Units into the skin nightly Indications: Type 2 Diabetes Inject as per sliding scale: If 201-250=1u; 251-300=2u; 301-350=3u; 351-400=4u. Call MD/NP if >400. Historical Provider, MD   b complex-C-folic acid (NEPHROCAPS) 1 MG capsule Take 1 capsule by mouth daily Indications: Dialysis Dependent Chronic Kidney Failure    Historical Provider, MD   pantoprazole (PROTONIX) 20 MG tablet Take 20 mg by mouth daily Indications: Gastroesophageal Reflux Disease    Historical Provider, MD   nitroGLYCERIN (NITROSTAT) 0.4 MG SL tablet up to max of 3 total doses.  If no relief after 1 dose, call 911. 11/15/22   Sam Couch MD   hydrOXYzine HCl (ATARAX) 25 MG tablet Take 25 mg by mouth every 8 hours as needed for Itching 10/19/22 Historical Provider, MD   loperamide (IMODIUM A-D) 2 MG tablet Take 2 mg by mouth every 6 hours as needed for Diarrhea Indications: Diarrhea 6/21/22 6/20/23  Historical Provider, MD   SURE COMFORT PEN NEEDLES 30G X 8 MM MISC USE AS DIRECTED FIVE TIMES A DAILY 10/18/22   MD RADAMES Santiago SOLOSTAR 100 UNIT/ML injection pen INJECT 55 UNITS SUBCUTANEOUSLY AT BEDTIME  Patient taking differently: Inject 55 Units into the skin nightly Indications: Type 2 Diabetes INJECT 55 UNITS SUBCUTANEOUSLY AT BEDTIME 10/3/22   Sesar Guzman MD   ARIPiprazole (ABILIFY) 5 MG tablet TAKE 1 TABLET BY MOUTH ONCE DAILY  Patient taking differently: Take 5 mg by mouth daily Indications: Paranoid Schizophrenia TAKE 1 TABLET BY MOUTH ONCE DAILY 8/17/22 2/13/23  Michael Nathan MD   sertraline (ZOLOFT) 50 MG tablet Take 1 tablet by mouth nightly  Patient taking differently: Take 50 mg by mouth nightly Indications: Depression 7/16/22   Jennifer Joyce DO   atorvastatin (LIPITOR) 40 MG tablet Take 1 tablet by mouth nightly  Patient taking differently: Take 40 mg by mouth nightly Indications: High Amount of Fats in the Blood 7/16/22   Jennifer Joyec DO   midodrine (PROAMATINE) 5 MG tablet Take 1 tablet by mouth one time a day every Tue, Thu, Sat for hypotension. Give 30 mins prior to dialysis. Patient taking differently: Take 5 mg by mouth three times a week Take 1 tablet by mouth one time a day every Tue, Thu, Sat for hypotension. Give 30 mins prior to dialysis. 3/16/22   Deanna Lr MD   Handicap Placard MISC by Does not apply route Expiration in 5 years.  3/16/22   Deanna Lr MD   blood glucose test strips (ONETOUCH VERIO) strip QID 12/13/21   Sesar Guzman MD   OneTouch Delica Lancets 18Y MISC QID 12/13/21   Sesar Guzman MD   Blood Glucose Monitoring Suppl (ONETOUCH VERIO) w/Device KIT AS DIRECTED 12/13/21   Sesar Guzman MD   aspirin EC 81 MG EC tablet Take 1 tablet by mouth daily  Patient taking differently: Take 81 mg by mouth daily Indications: Thickening and Hardening of Heart Arteries 5/25/21   Mahesh Delgado MD   blood glucose test strips (FREESTYLE LITE) strip 1 each by Does not apply route 4 times daily (before meals and nightly) As needed. 3/12/21   MARCELO Vargas   Alcohol Swabs (EASY TOUCH ALCOHOL PREP MEDIUM) 70 % PADS USE AS DIRECTED THREE TIMES A DAY 3/12/21   MARCELO Vargas   FreeStyle Lancets MISC Test 4x daily 3/11/21   MARCELO Vargas   acetaminophen (TYLENOL) 325 MG tablet Take 2 tablets by mouth every 4 hours as needed for Pain (For mild to moderate pain (Pain 1-6 out of 10 on pain scale)) 2/24/21   Lena Dixon PA-C   Blood Glucose Monitoring Suppl (FREESTYLE LITE) CORETTA 1 Device by Does not apply route daily as needed (Diabetes) Use freestyle meter to test blood sugar as needed 12/29/20   MARCELO Vargas       Allergies:  Codeine and Oxycontin [oxycodone hcl]    Social History:   TOBACCO:   reports that she has never smoked. She has never used smokeless tobacco.  ETOH:   reports no history of alcohol use. Family History:       Problem Relation Age of Onset    Cancer Mother 76        survived    Breast Cancer Mother     Hypertension Father     Diabetes Sister     Mental Illness Sister     Colon Cancer Neg Hx        REVIEW OF SYSTEMS:  Ten systems reviewed and negative except as stated in the HPI    Physical Exam:    Vitals: BP (!) 128/57   Pulse 73   Temp 97.8 °F (36.6 °C) (Oral)   Resp 10   Ht 5' 7\" (1.702 m)   Wt 192 lb (87.1 kg)   LMP  (LMP Unknown)   SpO2 100%   BMI 30.07 kg/m²   General appearance: alert, appears stated age and cooperative  Skin: Skin color, texture, turgor normal. No rashes or lesions  HEENT: Head: Normocephalic, no lesions, without obvious abnormality. . No dental issues   Neck: no jugular venous distention  Lungs: Scattered crackles  Heart: regular rate and rhythm, S1, S2 normal, no murmur, click, rub or gallop  Abdomen: soft, non-tender; bowel sounds normal; no masses,  no organomegaly  Extremities: Mild leg edema  Neurologic: Mental status: Alert, oriented, thought content appropriate. Recent Labs     02/15/23  1130   WBC 9.8   HGB 14.2        Recent Labs     02/15/23  1130   *   K 4.1   CL 94*   CO2 24   BUN 23   CREATININE 3.76*   GLUCOSE 400*   AST 29   ALT 25   BILITOT 0.5   ALKPHOS 208*     Troponin T:   Recent Labs     02/15/23  1130   TROPONINI 0.043*       ABGs:   Lab Results   Component Value Date/Time    PHART 7.437 11/12/2022 04:09 PM    PO2ART 80 11/12/2022 04:09 PM    JJF4TWQ 41 11/12/2022 04:09 PM     INR:   Recent Labs     02/15/23  1130   INR 1.1     URINALYSIS:No results for input(s): NITRITE, COLORU, PHUR, LABCAST, WBCUA, RBCUA, MUCUS, TRICHOMONAS, YEAST, BACTERIA, CLARITYU, SPECGRAV, LEUKOCYTESUR, UROBILINOGEN, BILIRUBINUR, BLOODU, GLUCOSEU, AMORPHOUS in the last 72 hours. Invalid input(s): Azell Govern  -----------------------------------------------------------------   XR CHEST PORTABLE    Result Date: 2/15/2023  EXAMINATION: ONE XRAY VIEW OF THE CHEST 2/15/2023 11:33 am COMPARISON: None. HISTORY: ORDERING SYSTEM PROVIDED HISTORY: CP, SOB on exertion, TECHNOLOGIST PROVIDED HISTORY: Reason for exam:->CP, SOB on exertion, What reading provider will be dictating this exam?->CRC FINDINGS: There is cardiomegaly with vascular congestion. There is minimal atelectasis and small pleural effusions in the lung bases. Left IJ dialysis catheter is noted. Mild CHF. Assessment and Plan   Chest pain: Atypical in nature. Cardiology on consult. Further management and work-up as per cardiology. End-stage renal disease: Nephrology consult. DM: monitor glucose accordion, titrate meds as needed  HTN: monitor BP, adjust meds as needed  History of CAD, status post CABG. DVT ppx  Disposition: Dependent on hospital course. Will discharge once medically stable. SW on board for discharge planning. Patient Active Problem List   Diagnosis Code    Atherosclerotic heart disease of native coronary artery with unspecified angina pectoris (HCC) I25.119    Schizophrenia, paranoid, chronic N84.8    Metabolic syndrome N37.72    Vitamin B 12 deficiency E53.8    Cerebral microvascular disease I67.89    Mixed hyperlipidemia E78.2    Other hammer toe (acquired) M20.40    Vitamin D insufficiency E55.9    Incontinence of urine R32    Diabetic nephropathy with proteinuria ([facility]) E11.21    Essential (primary) hypertension I10    History of type C viral hepatitis Z86.19    Urinary incontinence due to cognitive impairment R39.81    History of seizures Z87.898    Stented coronary artery-plan is to stay on Plavix indefinately per Dr Andreina Cordero Z95.5    Other specified diabetes mellitus with diabetic neuropathy, unspecified ([facility] E13.40    Controlled type 2 diabetes mellitus with diabetic neuropathy, with long-term current use of insulin ([facility] E11.40, Z79.4    Hemiparesis, left ([facility]) G81.94    Angina, class II ([facility] I20.9    Pain, unspecified R52    Tardive dyskinesia G24.01    Shortness of breath R06.02    Uncontrolled type 2 diabetes mellitus with hyperglycemia ([facility]) E11.65    Chronic diastolic congestive heart failure ([facility]) I50.32    Sleep apnea, unspecified G47.30    Pulmonary hypertension, unspecified ([facility]) I27.20    Class 2 severe obesity with serious comorbidity and body mass index (BMI) of 36.0 to 36.9 in adult ([facility] E66.01, Z68.36    Edema R60.9    Closed supracondylar fracture of right humerus S42.411A    Other chronic pain G89.29    Palliative care patient Z51.5    Recurrent falls R29.6    Renal failure N19    Difficulty in walking R26.2    ESRD (end stage renal disease) on dialysis ([facility] N18.6, Z99.2    Weakness R53.1    Other seizures ([facility]) G40.89    Moderate persistent asthma without complication B11.52    OSOGU-76 U07.1    Post PTCA Z98.61    Falls frequently R29.6    Chest wall contusion, left, initial encounter S20. 212A    Headache, unspecified R51.9    Paranoid schizophrenia ([facility] F20.0    Compression fracture of spine ([facility] M48.50XA    Closed rib fracture S22.39XA    Depression F32. A    Chronic obstructive pulmonary disease ([facility]) J44.9    Critical illness polyneuropathy ([facility]) G62.81    Multiple closed fractures of ribs of right side S22.41XA    Nonrheumatic mitral valve regurgitation I34.0    Nonrheumatic tricuspid valve regurgitation I36.1    Need for extended care facility Z78.9    Chronic pain of both shoulders M25.511, G89.29, M25.512    Hemodialysis-associated hypotension I95.3    Anginal chest pain at rest [facility]) I20.8    Chest pain R07.9    Unstable angina ([facility]) I20.0    NSTEMI (non-ST elevated myocardial infarction) ([facility]) I21.4    CKD (chronic kidney disease) stage 4, GFR 15-29 ml/min ([facility]) N18.4    Hyperkalemia E87.5    Impaired mobility and activities of daily living dt polyneuropathy and reccent fall  Z74.09, Z78.9    Dialysis patient ([facility] Z99.2    Unspecified open wound of right upper arm, initial encounter S41.101A    Multiple closed fractures of right lower extremity and ribs S82.91XA, S22.41XA    Closed T11 fracture ([facility]) S22.089A    Encephalopathy acute G93.40    MRSA bacteremia R78.81, B95.62    Sepsis due to Enterococcus [facility]) A41.81    Local infection due to central venous catheter T80.212A    DJD (degenerative joint disease), cervical M47.812    Closed head injury S09.90XA    Sarcopenia M62.84    Fall from standing W19. XXXA    Septicemia ([facility] A41.9    Chronic hepatitis C ([facility] B18.2    Catheter-related bloodstream infection T80.211A    Enterococcus faecalis infection B95.2    Cervical stenosis of spinal canal M48.02    Ataxic gait R26.0    Lumbar stenosis with neurogenic claudication M48.062    Falls infrequently Z91.81    Infection of venous access port Jeaen Perkins MD, MD

## 2023-02-15 NOTE — CONSULTS
Chief Complaint   Patient presents with    Chest Pain     2 days    Shortness of Breath     Since yesterday        Patient is a 70 y.o. female who presents with a chief complaint of chest pain as well as shortness of breath. Patient is followed on a regular basis by Dr. Gavin Stewart MD. patient with past medical history of coronary disease status post PCI, CABG in January 2022 with LIND to LAD, tricuspid valve repair complicated by tamponade and pericardial window, moderate carotid artery disease 50 to 69% bilateral ICA stenosis, diabetes mellitus, hypertension, hyperlipidemia, chronic congestive heart failure, chronic kidney disease, multimedical problems. Patient complains of typical anginal symptoms, midsternal chest pressure and heaviness at rest, feels similar to prior having CABG/PCI. She also complains of shortness of breath at rest and worse with minimal exertion  Patient took sublingual nitroglycerin without significant relief. Has mildly elevated cardiac enzyme and BNP of 16,900. Patient with chronic disease stage V with a creatinine of 3.76. Chest x-ray shows mild congestive heart failure  EKG with normal sinus rhythm, right bundle branch block.       Past Medical History:   Diagnosis Date    Angina at rest [facility]) 5/24/2020    Anxiety     CAD S/P percutaneous coronary angioplasty 2015, 2018    stents per dr rAleen Garza    CHF (congestive heart failure) ([address]     CKD (chronic kidney disease) stage 4, GFR 15-29 ml/min ([address] 2/24/2018    CKD stage 4 due to type 2 diabetes mellitus ([address]     Contusion of right chest wall 2/16/2021    COPD (chronic obstructive pulmonary disease) ([address]     Diabetic nephropathy with proteinuria ([address] 2014    DJD (degenerative joint disease) of knee     Dr Tong Valencia    GERD (gastroesophageal reflux disease)     Hemiparesis, left ([address] 2013    entered [facility])    Hemodialysis patient [facility])     Hemodialysis-associated hypotension 10/22/2021 History of heart failure     History of seizures     History of type C viral hepatitis     HTN (hypertension)     Hyperlipidemia     Impaired mobility and activities of daily living     Infection of venous access port 7/29/2022    Mediastinal lymphadenopathy 2013    Dr Yohan Lopes    Metabolic syndrome     Moderate persistent asthma without complication 3/14/2125    Need for extended care facility 7/7/2021    Neurogenic urinary incontinence 2013    Neuropathy in diabetes [facility])     Nonrheumatic mitral valve regurgitation 7/7/2021    Nonrheumatic tricuspid valve regurgitation 7/7/2021    Obesity (BMI 30-39. 9)     Recurrent UTI     S/P colonoscopy 2014    [facility], focal active colitis    Schizophrenia, paranoid, chronic ([address]     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 ([address]     Type 2 diabetes mellitus with renal manifestations, controlled ([address] 2015    Insulin dependent, Dr Kristina Milner    Uncontrolled type 2 diabetes mellitus with hyperglycemia ([address]     Urinary incontinence due to cognitive impairment 2013    Vitamin D deficiency 2014      Patient Active Problem List   Diagnosis    Atherosclerotic heart disease of native coronary artery with unspecified angina pectoris (HCC)    Schizophrenia, paranoid, chronic    Metabolic syndrome    Vitamin B 12 deficiency    Cerebral microvascular disease    Mixed hyperlipidemia    Other hammer toe (acquired)    Vitamin D insufficiency    Incontinence of urine    Diabetic nephropathy with proteinuria ([address]    Essential (primary) hypertension    History of type C viral hepatitis    Urinary incontinence due to cognitive impairment    History of seizures    Stented coronary artery-plan is to stay on Plavix indefinately per Dr Garcia Keep    Other specified diabetes mellitus with diabetic neuropathy, unspecified ([facility]    Controlled type 2 diabetes mellitus with diabetic neuropathy, with long-term current use of insulin ([facility])    Hemiparesis, left ([facility])    Angina, class II ([facility])    Pain, unspecified    Tardive dyskinesia    Shortness of breath    Uncontrolled type 2 diabetes mellitus with hyperglycemia ([facility])    Chronic diastolic congestive heart failure ([facility])    Sleep apnea, unspecified    Pulmonary hypertension, unspecified ([facility])    Class 2 severe obesity with serious comorbidity and body mass index (BMI) of 36.0 to 36.9 in adult [facility])    Edema    Closed supracondylar fracture of right humerus    Other chronic pain    Palliative care patient    Recurrent falls    Renal failure    Difficulty in walking    ESRD (end stage renal disease) on dialysis ([facility])    Weakness    Other seizures ([facility])    Moderate persistent asthma without complication    [id number]    Post PTCA    Falls frequently    Chest wall contusion, left, initial encounter    Headache, unspecified    Paranoid schizophrenia ([facility]    Compression fracture of spine ([facility]    Closed rib fracture    Depression    Chronic obstructive pulmonary disease ([facility])    Critical illness polyneuropathy ([facility]    Multiple closed fractures of ribs of right side    Nonrheumatic mitral valve regurgitation    Nonrheumatic tricuspid valve regurgitation    Need for extended care facility    Chronic pain of both shoulders    Hemodialysis-associated hypotension    Anginal chest pain at rest [facility])    Chest pain    Unstable angina ([facility])    NSTEMI (non-ST elevated myocardial infarction) ([facility])    CKD (chronic kidney disease) stage 4, GFR 15-29 ml/min ([facility])    Hyperkalemia    Impaired mobility and activities of daily living dt polyneuropathy and reccent fall     Dialysis patient [facility])    Unspecified open wound of right upper arm, initial encounter    Multiple closed fractures of right lower extremity and ribs    Closed T11 fracture ([facility])    Encephalopathy acute    MRSA bacteremia    Sepsis due to Enterococcus [facility])    Local infection due to central venous catheter    DJD (degenerative joint disease), cervical    Closed head injury    Sarcopenia    Fall from standing    Septicemia [facility]    Chronic hepatitis C ([facility]    Catheter-related bloodstream infection    Enterococcus faecalis infection    Cervical stenosis of spinal canal    Ataxic gait    Lumbar stenosis with neurogenic claudication    Falls infrequently    Infection of venous access port       Past Surgical History:   Procedure Laterality Date     SECTION      x1    COLONOSCOPY  2014    Dr. Silvia Roger      x1 Dr. Bessie Vega, Dr Tisha Villeda  08/10/2021    [facility]    DIAGNOSTIC CARDIAC CATH LAB PROCEDURE  10/02/2019    DIALYSIS CATHETER INSERTION Left 2022    Tunneled Symetrex 15.5F x 23cm hemodialysis catheter inserted by Dr. La Alexander Left 2022    [id number] replamcent tunneld HD cath by Dr. Ng Co, TOTAL ABDOMINAL (CERVIX REMOVED)      one ovary intact, Dr Cervantes Child, menorrhagia    IR THROMBECTOMY PERCUT AVF  2022    IR THROMBECTOMY PERCUT AVF 2022 [facility] SPECIAL PROCEDURE    IR TUNNELED CATHETER PLACEMENT GREATER THAN 5 YEARS  2022    IR TUNNELED CATHETER PLACEMENT GREATER THAN 5 YEARS 6/3/2022 [facility] PROCEDURE    IR TUNNELED CATHETER PLACEMENT GREATER THAN 5 YEARS Left 2022 Dr. Suzanne Morales exchanged to 15.5F x 28 cm.      15.5 fr by 23 cm Symetrex dialysis catheter inserted by Dr Suzanne Morales    IR TUNNELED [place] 5 YEARS  2022    IR TUNNELED CATHETER PLACEMENT GREATER THAN 5 YEARS 2022 [facility] SPECIAL PROCEDURE    MT TOTAL KNEE ARTHROPLASTY Left 2018    LEFT KNEE TOTAL KNEE ARTHROPLASTY, SHAYNA, NERVE BLOCK [facility] [address] [facility]     TOTAL KNEE ARTHROPLASTY 05/19/2016    Dr Liana Segura    TUNNELED VENOUS CATHETER PLACEMENT Right 07/01/2020    tunneled HD catheter per Dr Boone Perales History    Marital status:     Number of children: 2   Occupational History    Occupation: disabled   Tobacco Use    Smoking status: Never    Smokeless tobacco: Never   Vaping Use    Vaping Use: Never used   Substance and Sexual Activity    Alcohol use: No     Alcohol/week: 0.0 standard drinks    Drug use: No    Sexual activity: Not Currently   Social History Narrative    Disabled dt depression and low back pain, [place] [facility] [place] by and is her paid caregiver via waiver services    HD via [employer], [id number], PETÄJÄVESI, Sat. Son is in the home and has CP and is total care-and is also cared for by a waiver by [facility]. 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 reading ([place])             10/11/2021 [facility] updates; patient lives with her daughter son-in-law and 2 grandchildren and patient's handicapped son. Per daughter, her brother is blind, MRDD, CP multiple health issues. Daughter's  is patient's legal guardian. Patient has hemodialysis Tuesday Thursday and Saturday. Patient's bedroom is on main floor with a half bath. Daughter walks patient upstairs once weekly for full bath. Patient is using her walker in the home. Patient has a hospital bed in the home.      Social Determinants of Health     Financial Resource Strain: Low Risk     Difficulty of Paying Living Expenses: Not hard at all   Food Insecurity: No Food Insecurity    Worried About [employer] in the Last Year: Never true    Ran Out of Food in the Last Year: Never true   Transportation Needs: No Transportation Needs    Lack of Transportation (Medical): No    Lack of Transportation (Non-Medical):  No   Physical Activity: Sufficiently Active    Days of Exercise per Week: 3 days    Minutes of Exercise per Session: 60 min       Family History   Problem Relation Age of Onset    Cancer Mother 76        survived    Breast Cancer Mother     Hypertension Father     Diabetes Sister     Mental Illness Sister     Colon Cancer Neg Hx        Current Facility-Administered Medications   Medication Dose Route Frequency Provider Last Rate Last Admin    ticagrelor (BRILINTA) tablet 180 mg  180 mg Oral BID Vinnie SEUN Thomas, DO   180 mg at 02/15/23 1254    sodium chloride flush 0.9 % injection 5-40 mL  5-40 mL IntraVENous 2 times per day Lacy Berman MD        sodium chloride flush 0.9 % injection 5-40 mL  5-40 mL IntraVENous PRN Lacy Berman MD        0.9 % sodium chloride infusion   IntraVENous PRN Lacy Berman MD        enoxaparin Sodium (LOVENOX) injection 30 mg  30 mg SubCUTAneous Daily Lacy Berman MD   30 mg at 02/15/23 1416    ondansetron (ZOFRAN-ODT) disintegrating tablet 4 mg  4 mg Oral Q8H PRN Lacy Berman MD        Or    ondansetron (ZOFRAN) injection 4 mg  4 mg IntraVENous Q6H PRN Lacy Berman MD        polyethylene glycol (GLYCOLAX) packet 17 g  17 g Oral Daily PRN Lacy Berman MD        acetaminophen (TYLENOL) tablet 650 mg  650 mg Oral Q6H PRN Lacy Berman MD        Or    acetaminophen (TYLENOL) suppository 650 mg  650 mg Rectal Q6H PRN Lacy Berman MD         Current Outpatient Medications   Medication Sig Dispense Refill    albuterol (PROVENTIL) 2.5 MG/0.5ML NEBU nebulizer solution Take 2.5 mg by nebulization every 4 hours as needed for Wheezing or Shortness of Breath      LACTOBACILLUS PO Take 2 capsules by mouth 2 times daily Indications: Nutritional Support      insulin lispro (HUMALOG) 100 UNIT/ML injection vial Inject 12 Units into the skin 3 times daily (before meals) Indications: Type 2 Diabetes      insulin lispro (HUMALOG) 100 UNIT/ML injection vial Inject 0-10 Units into the skin 3 times daily (before meals) Indications: Type 2 Diabetes Inject as per sliding scale: If 151-200=2u; 201-250-4u; 251-300=6u; 301-350=8u; 351-400=10u. Call MD/NP if >400. insulin lispro (HUMALOG) 100 UNIT/ML injection vial Inject 0-4 Units into the skin nightly Indications: Type 2 Diabetes Inject as per sliding scale: If 201-250=1u; 251-300=2u; 301-350=3u; 351-400=4u. Call MD/NP if >400.      b complex-C-folic acid (NEPHROCAPS) 1 MG capsule Take 1 capsule by mouth daily Indications: Dialysis Dependent Chronic Kidney Failure      pantoprazole (PROTONIX) 20 MG tablet Take 20 mg by mouth daily Indications: Gastroesophageal Reflux Disease      nitroGLYCERIN (NITROSTAT) 0.4 MG SL tablet up to max of 3 total doses.  If no relief after 1 dose, call 911. 25 tablet 3    hydrOXYzine HCl (ATARAX) 25 MG tablet Take 25 mg by mouth every 8 hours as needed for Itching      loperamide (IMODIUM A-D) 2 MG tablet Take 2 mg by mouth every 6 hours as needed for Diarrhea Indications: Diarrhea      SURE COMFORT PEN NEEDLES 30G X 8 MM MISC USE AS DIRECTED FIVE TIMES A DAILY 100 each 10    LANTUS SOLOSTAR 100 UNIT/ML injection pen INJECT 55 UNITS SUBCUTANEOUSLY AT BEDTIME (Patient taking differently: Inject 55 Units into the skin nightly Indications: Type 2 Diabetes INJECT 55 UNITS SUBCUTANEOUSLY AT BEDTIME) 15 mL 10    ARIPiprazole (ABILIFY) 5 MG tablet TAKE 1 TABLET BY MOUTH ONCE DAILY (Patient taking differently: Take 5 mg by mouth daily Indications: Paranoid Schizophrenia TAKE 1 TABLET BY MOUTH ONCE DAILY) 30 tablet 10    sertraline (ZOLOFT) 50 MG tablet Take 1 tablet by mouth nightly (Patient taking differently: Take 50 mg by mouth nightly Indications: Depression) 30 tablet 5    atorvastatin (LIPITOR) 40 MG tablet Take 1 tablet by mouth nightly (Patient taking differently: Take 40 mg by mouth nightly Indications: High Amount of Fats in the Blood) 30 tablet 5    midodrine (PROAMATINE) 5 MG tablet Take 1 tablet by mouth one time a day every Tue, Thu, Sat for hypotension. Give 30 mins prior to dialysis. (Patient taking differently: Take 5 mg by mouth three times a week Take 1 tablet by mouth one time a day every Tue, Thu, Sat for hypotension. Give 30 mins prior to dialysis.) 90 tablet 3    Handicap Placard MISC by Does not apply route Expiration in 5 years. 1 each 0    blood glucose test strips (ONETOUCH VERIO) strip  each 3    OneTouch Delica Lancets 81D MISC  each 3    Blood Glucose Monitoring Suppl (ONETOUCH VERIO) w/Device KIT AS DIRECTED 1 kit 0    aspirin EC 81 MG EC tablet Take 1 tablet by mouth daily (Patient taking differently: Take 81 mg by mouth daily Indications: Thickening and Hardening of Heart Arteries) 30 tablet 12    blood glucose test strips (FREESTYLE LITE) strip 1 each by Does not apply route 4 times daily (before meals and nightly) As needed. 200 strip 5    Alcohol Swabs (EASY TOUCH ALCOHOL PREP MEDIUM) 70 % PADS USE AS DIRECTED THREE TIMES A  each 3    FreeStyle Lancets MISC Test 4x daily 150 each 3    acetaminophen (TYLENOL) 325 MG tablet Take 2 tablets by mouth every 4 hours as needed for Pain (For mild to moderate pain (Pain 1-6 out of 10 on pain scale)) 120 tablet 0    Blood Glucose Monitoring Suppl (FREESTYLE LITE) CORETTA 1 Device by Does not apply route daily as needed (Diabetes) Use freestyle meter to test blood sugar as needed 1 Device 0       ALLERGIES: Codeine and Oxycontin [oxycodone hcl]    Review of Systems   Constitutional: Negative. Negative for chills and fever. HENT: Negative. Eyes: Negative.     Respiratory:  Positive for shortness of breath. Negative for wheezing. Cardiovascular:  Positive for chest pain. Negative for palpitations and leg swelling. Gastrointestinal: Negative. Negative for abdominal pain, nausea and vomiting. Endocrine: Negative. Genitourinary: Negative. Musculoskeletal: Negative. Skin: Negative. Negative for rash. Allergic/Immunologic: Negative. Neurological: Negative. Negative for dizziness, weakness and headaches. Hematological: Negative. Psychiatric/Behavioral: Negative. VITALS:  Blood pressure (!) 128/57, pulse 73, temperature 97.8 °F (36.6 °C), temperature source Oral, resp. rate 10, height 5' 7\" (1.702 m), weight 192 lb (87.1 kg), SpO2 100 %, not currently breastfeeding. Body mass index is 30.07 kg/m². Physical Exam  Vitals and nursing note reviewed. Constitutional:       Appearance: She is well-developed. She is not diaphoretic. HENT:      Head: Normocephalic and atraumatic. Eyes:      Pupils: Pupils are equal, round, and reactive to light. Neck:      Thyroid: No thyromegaly. Vascular: No JVD. Trachea: No tracheal deviation. Cardiovascular:      Rate and Rhythm: Normal rate and regular rhythm. Chest Wall: PMI is not displaced. Pulses: Intact distal pulses. Heart sounds: Normal heart sounds. Heart sounds not distant. No murmur heard. No friction rub. No gallop. No S3 sounds. Pulmonary:      Effort: No respiratory distress. Breath sounds: No wheezing or rales. Chest:      Chest wall: No tenderness. Abdominal:      General: Bowel sounds are normal. There is no distension. Palpations: Abdomen is soft. There is no mass. Tenderness: There is no abdominal tenderness. There is no guarding or rebound. Musculoskeletal:         General: Normal range of motion. Cervical back: Normal range of motion and neck supple. Skin:     General: Skin is warm and dry. Coloration: Skin is not pale. Findings: No erythema or rash. Neurological:      Mental Status: She is alert and oriented to person, place, and time. Cranial Nerves: No cranial nerve deficit. Psychiatric:         Behavior: Behavior normal.         Thought Content:  Thought content normal.         Judgment: Judgment normal.       LABS:  Recent Results (from the past 24 hour(s))   EKG 12 Lead    Collection Time: 02/15/23 11:20 AM   Result Value Ref Range    Ventricular Rate 85 BPM    Atrial Rate 85 BPM    P-R Interval 246 ms    QRS Duration 132 ms    Q-T Interval 444 ms    QTc Calculation (Bazett) 528 ms    P Axis 74 degrees    R Axis -61 degrees    T Axis 73 degrees   APTT    Collection Time: 02/15/23 11:30 AM   Result Value Ref Range    aPTT 28.2 24.4 - 36.8 sec   Brain Natriuretic Peptide    Collection Time: 02/15/23 11:30 AM   Result Value Ref Range    Pro-BNP 16,981 pg/mL   CBC with Auto Differential    Collection Time: 02/15/23 11:30 AM   Result Value Ref Range    WBC 9.8 4.8 - 10.8 K/uL    RBC 4.75 4.20 - 5.40 M/uL    Hemoglobin 14.2 12.0 - 16.0 g/dL    Hematocrit 42.5 37.0 - 47.0 %    MCV 89.4 79.4 - 94.8 fL    MCH 29.8 27.0 - 31.3 pg    MCHC 33.4 33.0 - 37.0 %    RDW 16.5 (H) 11.5 - 14.5 %    Platelets 092 400 - 424 K/uL    Neutrophils % 76.0 %    Lymphocytes % 14.8 %    Monocytes % 6.3 %    Eosinophils % 2.4 %    Basophils % 0.5 %    Neutrophils Absolute 7.5 (H) 1.4 - 6.5 K/uL    Lymphocytes Absolute 1.5 1.0 - 4.8 K/uL    Monocytes Absolute 0.6 0.2 - 0.8 K/uL    Eosinophils Absolute 0.2 0.0 - 0.7 K/uL    Basophils Absolute 0.1 0.0 - 0.2 K/uL   CK    Collection Time: 02/15/23 11:30 AM   Result Value Ref Range    Total CK 46 0 - 170 U/L   Comprehensive Metabolic Panel    Collection Time: 02/15/23 11:30 AM   Result Value Ref Range    Sodium 132 (L) 135 - 144 mEq/L    Potassium 4.1 3.4 - 4.9 mEq/L    Chloride 94 (L) 95 - 107 mEq/L    CO2 24 20 - 31 mEq/L    Anion Gap 14 9 - 15 mEq/L    Glucose 400 (HH) 70 - 99 mg/dL    BUN 23 8 - 23 mg/dL    Creatinine 3.76 (H) 0.50 - 0.90 mg/dL    Est, Glom Filt Rate 12.8 (L) >60    Calcium 9.7 8.5 - 9.9 mg/dL    Total Protein 7.4 6.3 - 8.0 g/dL    Albumin 3.6 3.5 - 4.6 g/dL    Total Bilirubin 0.5 0.2 - 0.7 mg/dL    Alkaline Phosphatase 208 (H) 40 - 130 U/L    ALT 25 0 - 33 U/L    AST 29 0 - 35 U/L    Globulin 3.8 (H) 2.3 - 3.5 g/dL   High sensitivity CRP    Collection Time: 02/15/23 11:30 AM   Result Value Ref Range    CRP High Sensitivity 17.5 (H) 0.0 - 5.0 mg/L   Magnesium    Collection Time: 02/15/23 11:30 AM   Result Value Ref Range    Magnesium 2.0 1.7 - 2.4 mg/dL   Protime-INR    Collection Time: 02/15/23 11:30 AM   Result Value Ref Range    Protime 14.2 12.3 - 14.9 sec    INR 1.1    Troponin    Collection Time: 02/15/23 11:30 AM   Result Value Ref Range    Troponin 0.043 (HH) 0.000 - 0.010 ng/mL   TSH    Collection Time: 02/15/23 11:30 AM   Result Value Ref Range    TSH 1.550 0.440 - 3.860 uIU/mL   COVID-19, Rapid    Collection Time: 02/15/23 11:52 AM    Specimen: Nasopharyngeal Swab   Result Value Ref Range    SARS-CoV-2, NAAT Not Detected Not Detected   Rapid Influenza A/B Antigens    Collection Time: 02/15/23 11:52 AM    Specimen: Nasopharyngeal   Result Value Ref Range    Influenza A by PCR Negative     Influenza B by PCR Negative    POCT Glucose    Collection Time: 02/15/23  3:03 PM   Result Value Ref Range    POC Glucose 199 (H) 70 - 99 mg/dl    Performed on ACCU-CHEK      Troponin:   Lab Results   Component Value Date/Time    TROPONINI 0.043 02/15/2023 11:30 AM       EKG: normal sinus rhythm, RBBB      ASSESSMENT:    Angina at rest, angina class IV  Elevated cardiac enzyme-rule out cardiac ischemia versus demand ischemia end-stage renal disease hemodialysis dependent  Shortness of breath secondary to acute on chronic diastolic congestive heart failure  History of CAD status post PCI/CABG  History of pericardial effusion/tamponade status post window  End-stage renal disease hemodialysis dependent  Diabetes mellitus  Hypertension  Hyperlipidemia  Multiple medical problems    PLAN:   As always, aggressive risk factor modification is strongly recommended. We should adhere to the JNC VIII guidelines for HTN management and the NCEPATP III guidelines for LDL-C management. Plan for cardiac catheterization tomorrow with Dr. Olga Gutierrez given typical anginal symptoms, elevated cardiac enzyme, multiple risk factors, history of CAD and prior history of 6 PCI/CABG. Patient agreeable to proceed and understands the risk benefits and alternatives of the procedure. Check 2D echocardiogram  Maximize cardiac medications  Monitor on telemetry  GI/DVT prophylaxis  Low-sodium diet  CHF teaching  Further recommendations to      Thank you for allowing me to participate in the care of your patient, please don't hesitate to contact me if you have any further questions.       Electronically signed by Sophie Álvarez DO on 2/15/2023 at 4:41 PM

## 2023-02-15 NOTE — ED NOTES
Patient transferred to bed 21 at this time.  Report to be given to [facility], RN  02/15/23 9327

## 2023-02-16 ENCOUNTER — APPOINTMENT (OUTPATIENT)
Dept: CARDIAC CATH/INVASIVE PROCEDURES | Age: 63
End: 2023-02-16
Payer: MEDICARE

## 2023-02-16 LAB
EKG ATRIAL RATE: 85 BPM
EKG P AXIS: 74 DEGREES
EKG P-R INTERVAL: 246 MS
EKG Q-T INTERVAL: 444 MS
EKG QRS DURATION: 132 MS
EKG QTC CALCULATION (BAZETT): 528 MS
EKG R AXIS: -61 DEGREES
EKG T AXIS: 73 DEGREES
EKG VENTRICULAR RATE: 85 BPM
GLUCOSE BLD-MCNC: 254 MG/DL (ref 70–99)
GLUCOSE BLD-MCNC: 326 MG/DL (ref 70–99)
GLUCOSE BLD-MCNC: 436 MG/DL (ref 70–99)
PERFORMED ON: ABNORMAL
TROPONIN: 0.03 NG/ML (ref 0–0.01)
TROPONIN: 0.04 NG/ML (ref 0–0.01)
TROPONIN: 0.04 NG/ML (ref 0–0.01)
TROPONIN: <0.01 NG/ML (ref 0–0.01)

## 2023-02-16 PROCEDURE — 6370000000 HC RX 637 (ALT 250 FOR IP)

## 2023-02-16 PROCEDURE — 6360000002 HC RX W HCPCS

## 2023-02-16 PROCEDURE — 6360000004 HC RX CONTRAST MEDICATION: Performed by: INTERNAL MEDICINE

## 2023-02-16 PROCEDURE — 6370000000 HC RX 637 (ALT 250 FOR IP): Performed by: INTERNAL MEDICINE

## 2023-02-16 PROCEDURE — 2709999900 HC NON-CHARGEABLE SUPPLY

## 2023-02-16 PROCEDURE — C1769 GUIDE WIRE: HCPCS

## 2023-02-16 PROCEDURE — 4A023N7 MEASUREMENT OF CARDIAC SAMPLING AND PRESSURE, LEFT HEART, PERCUTANEOUS APPROACH: ICD-10-PCS | Performed by: INTERNAL MEDICINE

## 2023-02-16 PROCEDURE — 5A1D70Z PERFORMANCE OF URINARY FILTRATION, INTERMITTENT, LESS THAN 6 HOURS PER DAY: ICD-10-PCS | Performed by: INTERNAL MEDICINE

## 2023-02-16 PROCEDURE — 84484 ASSAY OF TROPONIN QUANT: CPT

## 2023-02-16 PROCEDURE — 2580000003 HC RX 258: Performed by: INTERNAL MEDICINE

## 2023-02-16 PROCEDURE — 2580000003 HC RX 258

## 2023-02-16 PROCEDURE — 2500000003 HC RX 250 WO HCPCS

## 2023-02-16 PROCEDURE — 93459 L HRT ART/GRFT ANGIO: CPT

## 2023-02-16 PROCEDURE — 36415 COLL VENOUS BLD VENIPUNCTURE: CPT

## 2023-02-16 PROCEDURE — B2131ZZ FLUOROSCOPY OF MULTIPLE CORONARY ARTERY BYPASS GRAFTS USING LOW OSMOLAR CONTRAST: ICD-10-PCS | Performed by: INTERNAL MEDICINE

## 2023-02-16 PROCEDURE — C1894 INTRO/SHEATH, NON-LASER: HCPCS

## 2023-02-16 PROCEDURE — 6360000002 HC RX W HCPCS: Performed by: INTERNAL MEDICINE

## 2023-02-16 PROCEDURE — B2151ZZ FLUOROSCOPY OF LEFT HEART USING LOW OSMOLAR CONTRAST: ICD-10-PCS | Performed by: INTERNAL MEDICINE

## 2023-02-16 PROCEDURE — B2111ZZ FLUOROSCOPY OF MULTIPLE CORONARY ARTERIES USING LOW OSMOLAR CONTRAST: ICD-10-PCS | Performed by: INTERNAL MEDICINE

## 2023-02-16 PROCEDURE — 93459 L HRT ART/GRFT ANGIO: CPT | Performed by: INTERNAL MEDICINE

## 2023-02-16 PROCEDURE — 2060000000 HC ICU INTERMEDIATE R&B

## 2023-02-16 RX ORDER — SERTRALINE HYDROCHLORIDE 25 MG/1
50 TABLET, FILM COATED ORAL NIGHTLY
Status: DISCONTINUED | OUTPATIENT
Start: 2023-02-16 | End: 2023-02-16

## 2023-02-16 RX ORDER — SODIUM CHLORIDE 0.9 % (FLUSH) 0.9 %
5-40 SYRINGE (ML) INJECTION EVERY 12 HOURS SCHEDULED
Status: DISCONTINUED | OUTPATIENT
Start: 2023-02-16 | End: 2023-02-18 | Stop reason: HOSPADM

## 2023-02-16 RX ORDER — HEPARIN SODIUM 1000 [USP'U]/ML
4000 INJECTION, SOLUTION INTRAVENOUS; SUBCUTANEOUS PRN
Status: DISCONTINUED | OUTPATIENT
Start: 2023-02-16 | End: 2023-02-18 | Stop reason: HOSPADM

## 2023-02-16 RX ORDER — INSULIN LISPRO 100 [IU]/ML
0-4 INJECTION, SOLUTION INTRAVENOUS; SUBCUTANEOUS NIGHTLY
Status: DISCONTINUED | OUTPATIENT
Start: 2023-02-16 | End: 2023-02-18 | Stop reason: HOSPADM

## 2023-02-16 RX ORDER — HYDRALAZINE HYDROCHLORIDE 20 MG/ML
10 INJECTION INTRAMUSCULAR; INTRAVENOUS EVERY 10 MIN PRN
Status: DISCONTINUED | OUTPATIENT
Start: 2023-02-16 | End: 2023-02-18 | Stop reason: HOSPADM

## 2023-02-16 RX ORDER — MECOBALAMIN 5000 MCG
10 TABLET,DISINTEGRATING ORAL NIGHTLY PRN
Status: DISCONTINUED | OUTPATIENT
Start: 2023-02-16 | End: 2023-02-18 | Stop reason: HOSPADM

## 2023-02-16 RX ORDER — CHOLECALCIFEROL (VITAMIN D3) 10 MCG
1 TABLET ORAL DAILY
Status: DISCONTINUED | OUTPATIENT
Start: 2023-02-16 | End: 2023-02-18 | Stop reason: HOSPADM

## 2023-02-16 RX ORDER — SODIUM CHLORIDE 9 MG/ML
INJECTION, SOLUTION INTRAVENOUS CONTINUOUS
Status: DISCONTINUED | OUTPATIENT
Start: 2023-02-16 | End: 2023-02-17

## 2023-02-16 RX ORDER — MIDAZOLAM HYDROCHLORIDE 1 MG/ML
2 INJECTION INTRAMUSCULAR; INTRAVENOUS
Status: ACTIVE | OUTPATIENT
Start: 2023-02-16 | End: 2023-02-17

## 2023-02-16 RX ORDER — DEXTROSE MONOHYDRATE 100 MG/ML
INJECTION, SOLUTION INTRAVENOUS CONTINUOUS PRN
Status: DISCONTINUED | OUTPATIENT
Start: 2023-02-16 | End: 2023-02-18 | Stop reason: HOSPADM

## 2023-02-16 RX ORDER — ATORVASTATIN CALCIUM 40 MG/1
40 TABLET, FILM COATED ORAL NIGHTLY
Status: DISCONTINUED | OUTPATIENT
Start: 2023-02-16 | End: 2023-02-18 | Stop reason: HOSPADM

## 2023-02-16 RX ORDER — PANTOPRAZOLE SODIUM 20 MG/1
20 TABLET, DELAYED RELEASE ORAL DAILY
Status: DISCONTINUED | OUTPATIENT
Start: 2023-02-16 | End: 2023-02-18 | Stop reason: HOSPADM

## 2023-02-16 RX ORDER — INSULIN LISPRO 100 [IU]/ML
0-8 INJECTION, SOLUTION INTRAVENOUS; SUBCUTANEOUS
Status: DISCONTINUED | OUTPATIENT
Start: 2023-02-16 | End: 2023-02-18 | Stop reason: HOSPADM

## 2023-02-16 RX ORDER — HEPARIN SODIUM 5000 [USP'U]/ML
5000 INJECTION, SOLUTION INTRAVENOUS; SUBCUTANEOUS EVERY 8 HOURS SCHEDULED
Status: DISCONTINUED | OUTPATIENT
Start: 2023-02-17 | End: 2023-02-18 | Stop reason: HOSPADM

## 2023-02-16 RX ORDER — MORPHINE SULFATE 2 MG/ML
2 INJECTION, SOLUTION INTRAMUSCULAR; INTRAVENOUS
Status: COMPLETED | OUTPATIENT
Start: 2023-02-16 | End: 2023-02-16

## 2023-02-16 RX ORDER — LABETALOL HYDROCHLORIDE 5 MG/ML
10 INJECTION, SOLUTION INTRAVENOUS EVERY 30 MIN PRN
Status: DISCONTINUED | OUTPATIENT
Start: 2023-02-16 | End: 2023-02-18 | Stop reason: HOSPADM

## 2023-02-16 RX ORDER — ONDANSETRON 2 MG/ML
4 INJECTION INTRAMUSCULAR; INTRAVENOUS EVERY 6 HOURS PRN
Status: DISCONTINUED | OUTPATIENT
Start: 2023-02-16 | End: 2023-02-18 | Stop reason: HOSPADM

## 2023-02-16 RX ORDER — FENTANYL CITRATE 0.05 MG/ML
25 INJECTION, SOLUTION INTRAMUSCULAR; INTRAVENOUS
Status: ACTIVE | OUTPATIENT
Start: 2023-02-16 | End: 2023-02-17

## 2023-02-16 RX ORDER — SODIUM CHLORIDE 0.9 % (FLUSH) 0.9 %
5-40 SYRINGE (ML) INJECTION PRN
Status: DISCONTINUED | OUTPATIENT
Start: 2023-02-16 | End: 2023-02-18 | Stop reason: HOSPADM

## 2023-02-16 RX ORDER — SODIUM CHLORIDE 9 MG/ML
INJECTION, SOLUTION INTRAVENOUS PRN
Status: DISCONTINUED | OUTPATIENT
Start: 2023-02-16 | End: 2023-02-18 | Stop reason: HOSPADM

## 2023-02-16 RX ORDER — ACETAMINOPHEN 325 MG/1
650 TABLET ORAL EVERY 4 HOURS PRN
Status: DISCONTINUED | OUTPATIENT
Start: 2023-02-16 | End: 2023-02-18 | Stop reason: HOSPADM

## 2023-02-16 RX ADMIN — NITROGLYCERIN 0.4 MG: 0.4 TABLET SUBLINGUAL at 01:16

## 2023-02-16 RX ADMIN — INSULIN LISPRO 4 UNITS: 100 INJECTION, SOLUTION INTRAVENOUS; SUBCUTANEOUS at 15:58

## 2023-02-16 RX ADMIN — HEPARIN SODIUM 4000 UNITS: 1000 INJECTION INTRAVENOUS; SUBCUTANEOUS at 12:16

## 2023-02-16 RX ADMIN — PANTOPRAZOLE SODIUM 20 MG: 20 TABLET, DELAYED RELEASE ORAL at 15:50

## 2023-02-16 RX ADMIN — ASPIRIN 81 MG: 81 TABLET, COATED ORAL at 15:50

## 2023-02-16 RX ADMIN — ACETAMINOPHEN 650 MG: 325 TABLET ORAL at 18:12

## 2023-02-16 RX ADMIN — INSULIN LISPRO 4 UNITS: 100 INJECTION, SOLUTION INTRAVENOUS; SUBCUTANEOUS at 21:20

## 2023-02-16 RX ADMIN — NEPHROCAP 1 MG: 1 CAP ORAL at 15:50

## 2023-02-16 RX ADMIN — Medication 10 ML: at 21:22

## 2023-02-16 RX ADMIN — Medication 10 ML: at 09:00

## 2023-02-16 RX ADMIN — SODIUM CHLORIDE: 9 INJECTION, SOLUTION INTRAVENOUS at 23:07

## 2023-02-16 RX ADMIN — ATORVASTATIN CALCIUM 40 MG: 40 TABLET, FILM COATED ORAL at 21:21

## 2023-02-16 RX ADMIN — Medication 10 MG: at 01:16

## 2023-02-16 RX ADMIN — Medication 10 MG: at 21:21

## 2023-02-16 RX ADMIN — METOPROLOL TARTRATE 25 MG: 25 TABLET, FILM COATED ORAL at 21:21

## 2023-02-16 RX ADMIN — MORPHINE SULFATE 2 MG: 2 INJECTION, SOLUTION INTRAMUSCULAR; INTRAVENOUS at 21:21

## 2023-02-16 RX ADMIN — SERTRALINE HYDROCHLORIDE 50 MG: 50 TABLET ORAL at 15:50

## 2023-02-16 RX ADMIN — RANOLAZINE 500 MG: 500 TABLET, FILM COATED, EXTENDED RELEASE ORAL at 21:21

## 2023-02-16 RX ADMIN — INSULIN LISPRO 8 UNITS: 100 INJECTION, SOLUTION INTRAVENOUS; SUBCUTANEOUS at 08:08

## 2023-02-16 RX ADMIN — IOPAMIDOL 90 ML: 612 INJECTION, SOLUTION INTRAVENOUS at 14:08

## 2023-02-16 ASSESSMENT — PAIN SCALES - GENERAL
PAINLEVEL_OUTOF10: 0
PAINLEVEL_OUTOF10: 2
PAINLEVEL_OUTOF10: 6
PAINLEVEL_OUTOF10: 4
PAINLEVEL_OUTOF10: 6

## 2023-02-16 ASSESSMENT — PAIN DESCRIPTION - LOCATION: LOCATION: CHEST

## 2023-02-16 NOTE — PROGRESS NOTES
Treatment time: 210 minutes    Net UF: 2000 mL    Pre weight: 87.1 kg  Post weight: 85.1 kg  EDW: N/A    Access used: CVC L IJ  Access function: good    Medications or blood products given: 400 ml NS    Regular outpatient schedule: TTS    Summary of response to treatment: Patient tolerated treatment fairly, patient received 400 ml NS for hypotension, goal reduced due to hypotension, patient complained of chest pain during treatment and EKG obtained by [facility] RN, treatment terminated at that time and patient taken to cath lab for cath, new dressing applied to catheter site. Copy of dialysis treatment record placed in chart, to be scanned into EMR.

## 2023-02-16 NOTE — PROGRESS NOTES
Report received from [facility]. Transport requested for patient.

## 2023-02-16 NOTE — PROGRESS NOTES
12:40 Arrived to pre/post cath from dialysis, alert and oriented. Vital signs obtained. Consent for procedure is in chart. New IV started and pt taken to cath lab. 1445: Returned from cath lab to pre/post cath, alert and oriented. R groin is stable, no bleeding or hematoma. VSS. Lying flat, resting comfortably. 1520: R groin remains stable, no bleeding or hematoma. Report given to Elver zavaleta RN.  Transport requested for pt to return to [place] [address].

## 2023-02-16 NOTE — PROGRESS NOTES
DVT / VTE PROPHYLAXIS EVALUATION     Estimated Creatinine Clearance: 17 mL/min (A) (based on SCr of 3.76 mg/dL (H)). Recent Labs     02/15/23  1130   BUN 23   CREATININE 3.76*      HGB 14.2   HCT 42.5   INR 1.1      ADMITTING DX OR CHIEF COMPLAINT? Chest pain  WARFARIN? DOAC'S? no  ANY APPARENT BLEEDING? no  SCHEDULED SURGERY?  Cardiac cath      Current order:  Enoxaparin 30 mg SUBQ once daily       Plan:  Change to Heparin 5000 units Subcutaneously q 8 hours                Patient Weight (kg)        50.9 and below .9 101-150.9 151-174.9 175 or greater   Estimated   CrCl  (ml/min) 30 or greater []   30 mg   SUBQ daily    []   40 mg   SUBQ daily []  30 mg SUBQ   BID  []  40 mg   SUBQ   BID []  60mg SUBQ BID    15-29.9 []  UFH 5000   units SUBQ BID []  30 mg   SUBQ daily [] 30 mg SUBQ   daily []  40 mg SUBQ   daily [] 60 mg SUBQ   daily    Less than 15 or dialysis []  UFH 5000   units SUBQ BID [x] UFH 5000 units SUBQ TID []  UFH 7500   units   SUBQ TID         [facility], located [facility]  Staff Pharmacist, Ivan Vazquez  2/16/2023  10:45 AM

## 2023-02-16 NOTE — CARE COORDINATION
Case Management Assessment  Initial Evaluation    Date/Time of Evaluation: 2/16/2023 9:29 AM  Assessment Completed by: Miriam Lema RN    If patient is discharged prior to next notation, then this note serves as note for discharge by case management. Patient Name: Dheeraj Villatoro                   YOB: 1960  Diagnosis: Anginal chest pain at rest [facility]) [I20.8]  Chest pain [R07.9]  History of coronary artery disease [Z86.79]  End-stage renal disease on hemodialysis ([facility] [address] [N18.6, Z99.2]                   Date / Time: 2/15/2023 10:51 AM    Patient Admission Status: Inpatient   Readmission Risk (Low < 19, Mod (19-27), High > 27): Readmission Risk Score: 18.8    Current PCP: Caro Gill MD  PCP verified by CM? Yes    Chart Reviewed: Yes      History Provided by: Patient  Patient Orientation: Alert and Oriented    Patient Cognition: Alert    Hospitalization in the last 30 days (Readmission):  No    If yes, Readmission Assessment in CM Navigator will be completed. Advance Directives:      Code Status: Full Code   Patient's Primary Decision Maker is:      Primary Decision Maker: Angelina Fagan - Child - [phone]    Discharge Planning:    Patient lives with:   Type of Home: Other (Comment)  Primary Care Giver: Family (PT'S DTR WORKS FOR [employer] AND IS HER CG)  Patient Support Systems include:     Current Financial resources:    Current community resources:    Current services prior to admission:              Current DME:              Type of Home Care services:       ADLS  Prior functional level: Assistance with the following:, Bathing, Toileting, Housework, Shopping  Current functional level: Assistance with the following:, Bathing, Toileting, Housework, Shopping    PT AM-PAC:   /24  OT AM-PAC:   /24    Family can provide assistance at DC: Yes  Would you like Case Management to discuss the discharge plan with any other family members/significant others, and if so, who?  Yes (DAUGHTER MICHAEL)  Plans to Return to Present Housing: Yes  Other Identified Issues/Barriers to RETURNING to current housing: YES  Potential Assistance needed at discharge:              Potential DME:    Patient expects to discharge to:    Plan for transportation at discharge:      Financial    Payor: [address] [place] [address] [address] / Plan: MEDICARE PART A AND B / Product Type: *No Product type* /     Does insurance require precert for SNF: No    Potential assistance Purchasing Medications:    Meds-to-Beds request: Yes      [id number] - [place], [address]  [address]  [address] [address]  Phone: [phone] Fax: [address], [address] [facility]  [address] [address]  Phone: [phone] Fax: [address], 306 [facility] [phone] - f [phone]  [address] [facility] [address]  Phone: [phone] Fax: [phone]      Notes:    Factors facilitating achievement of predicted outcomes: Family support and Pleasant    Barriers to discharge: Medical complications    Additional Case Management Notes: MET WITH PT AT BEDSIDE  [facility]. PT [place] WHO ALSO IS HER HIRED CAREGIVER Lucretia. PT HAS SHOWER CHAIR, HOSPITAL BED, AND ROLLATOR. PT GOES TO HD [facility] IN [place], [place], SAT 11AM CHAIR TIME. SHE TAKES [facility] BUS TRANSPORATION. CALL TO [facility] SPOKE WITH MAURA AND PT WILL NEED A RESUMPTION OF CARE ORDER ON DISCHARGE FAXED [phone].     The Plan for Transition of Care is related to the following treatment goals of Anginal chest pain at rest [facility]) [I20.8]  Chest pain [R07.9]  History of coronary artery disease [Z86.79]  End-stage renal disease on hemodialysis ([facility] [address] [N18.6, N25.9]    IF APPLICABLE: The Patient and/or patient representative Maurice Grey and her family were provided with a choice of provider and agrees with the discharge plan. Freedom of choice list with basic dialogue that supports the patient's individualized plan of care/goals and shares the quality data associated with the providers was provided to:     Patient Representative Name:       The Patient and/or Patient Representative Agree with the Discharge Plan?       Alee Moran RN  Case Management Department

## 2023-02-16 NOTE — FLOWSHEET NOTE
1431- Patient given education regarding PCI. Patient states she has had one before and understands the procedure. Patient signed consent. Per Dr. Geni Storm patient will have dialysis first and then the heart catheterization. Patient made aware. Medications held this morning due to dialysis. Assessment complete. Call light in reach. Electronically signed by Mayela Braden on 2/16/2023 at 8:41 AM  3798- Patient left for dialysis. 1300- Patient went to cath lab straight from dialysis. 1500- Patient still off the floor. Electronically signed by Mayela Braden on 2/16/2023 at 3:04 PM

## 2023-02-16 NOTE — PROGRESS NOTES
HEMODIALYSIS PRE-TREATMENT NOTE    Patient Identifiers prior to treatment: Name,     Isolation Required: No                      Isolation Type: N/A       (please document if patient is being managed as a PUI/COVID-19 patient)        Hepatitis status:                           Date Drawn                             Result  Hepatitis B Surface Antigen 1/10/23     Neg                     Hepatitis B Surface Antibody 1/10/23 Immune        Hepatitis B Core Antibody N/A N/A          How was Hepatitis Status verified[facility]     Was a copy of the labs you documented provided to facility for the patient's chart: [facility]    Hemodialysis orders verified: Yes, with Dr. Ltaosha Van Within normal limits ( I.e. s/s of infection,...): WNL, no signs or symptoms of infection noted     Pre-Assessment completed: Yes    Pre-dialysis report received from: Estefani Mcclellan RN                      Time: 08:45

## 2023-02-16 NOTE — BRIEF OP NOTE
Section of Cardiology  Adult Brief Cardiac Cath Procedure Note        Procedure(s):  LHC, b/l coronary angio, LV gram, grafts angiogram    Pre-operative Diagnosis: Chest pain    H&P Status: Completed and reviewed.      Post-operative Diagnosis: Single-vessel disease    Findings:  See full report  Left dominant system  Left main: Big size vessel mild disease  LAD: Mid segment 100% occlusion  Circumflex big size dominant vessel mild disease  RCA: Small nondominant vessel    Grafts:  LIND to LAD widely patent      LVEDP 10 mmHg  No aortic valve gradient on catheter pullback  EF 65% by LV gram    Right groin access      Complications:  none    Recommendations:  Transfer patient back to holding area for post diagnostic cath management  Maximize medical therapy   Continue aspirin and high intensity statin indefinitely for secondary prevention of CAD  Continue beta-blocker   Bedrest for 4 hours  Follow-up morning labs including CBC BMP    Primary Proceduralist:   Miky Goodrich MD    Full procedure note to follow

## 2023-02-16 NOTE — CONSULTS
[address]                      [address], [address] [facility]                                  CONSULTATION    PATIENT NAME: Leonides Lu                  :        1960  MED REC NO:   [id number]                            ROOM:       [id number]  ACCOUNT NO:   [de-identified]                           ADMIT DATE: 02/15/2023  PROVIDER:     Tiarra Ware DO    CONSULT DATE:  2023    RENAL CONSULTATION    HISTORY OF PRESENT ILLNESS:  A 70year-old admitted to the hospital with  chest pain and discomfort. The patient has a long history of coronary  artery disease and has been cared for locally plus the [facility]. The patient has end-stage renal disease and is on  hemodialysis support three times a week at [facility] in [facility]. The patient has known coronary artery disease, COPD, diabetes mellitus  type 2. She is a hepatitis C carrier. The patient has peripheral  vascular disease. She has a history of schizophrenia. PAST SURGICAL HISTORY:  , colonoscopy, coronary artery stents,  right toe removal, right knee arthroscopy. MEDICINES AT TIME OF ADMISSION:  Proventil, lactobacillus, Humalog  coverage, folic acid, Protonix, nitroglycerin, Atarax, Imodium, Abilify. ALLERGIES TO MEDICATIONS:  CODEINE AND OXYCODONE. HABITS:  No alcohol use. Never smoked. No opioids. REVIEW OF SYSTEMS:  Unremarkable except for occasional chest pain and  discomfort. PHYSICAL EXAMINATION:  VITAL SIGNS:  Height 5 feet 7 inches, weight 192 pounds. Blood pressure  is 137/70, heart rate 60, respirations 20, afebrile. HEENT:  Normocephalic. Pupils equal and reactive to light. Extraocular  muscles intact. NECK:  Supple. No JVD, bruits or adenopathy. CHEST:  Lungs are clear. No wheezing, rales or rhonchi. No accessory  muscle use. CARDIOVASCULAR:  Heart is regular 1/6 systolic murmur. ABDOMEN:  Slightly overweight.   No guarding or rigidity. Bowel sounds  are present. EXTREMITIES:  Show no edema. The patient moves all limbs. SKIN:  Warm and dry. IMPRESSION:  1. End-stage renal disease, on hemodialysis support. 2.  Chest pain, active angina. 3.  Diabetes mellitus type 2.  4.  Known coronary artery disease. 5.  Peripheral vascular disease. 6.  COPD. 7.  History of hepatitis C carrier. PLAN:  _____ will have dialysis initially. Presently, cardiac  catheterization cannot be performed until 03:00 p.m. today. We will  watch for symptoms of angina and have such, stop dialysis. Cardiology  to evaluate her admission diagnosis, control blood pressure, review  labs.         Nai Barnes DO    D: 02/16/2023 15:13:16       T: 02/16/2023 15:17:53     [id number]  Job#: [id number]     Doc#: [id number]    CC:    (Retain this field even if not dictated or not decipherable)

## 2023-02-16 NOTE — CONSULTS
Renal consult dictated  ESRDX  Coronary artery disease   S/P CABGX  DM type-2  Hypertension    Plan needs dialysis will figure out timing based on Cardiac cath timing  Cath not till 3 will dialysis this morning

## 2023-02-16 NOTE — PROGRESS NOTES
Patient arrived to floor. Patient settled in bed. Complaining of chest pain of a 2/10. Patient given 1 tablet of nitro. No complaints of pain afterward.

## 2023-02-17 LAB
ANION GAP SERPL CALCULATED.3IONS-SCNC: 12 MEQ/L (ref 9–15)
BUN BLDV-MCNC: 45 MG/DL (ref 8–23)
CALCIUM SERPL-MCNC: 8.6 MG/DL (ref 8.5–9.9)
CHLORIDE BLD-SCNC: 92 MEQ/L (ref 95–107)
CO2: 23 MEQ/L (ref 20–31)
CREAT SERPL-MCNC: 4.79 MG/DL (ref 0.5–0.9)
GFR SERPL CREATININE-BSD FRML MDRD: 9.5 ML/MIN/{1.73_M2}
GLUCOSE BLD-MCNC: 135 MG/DL (ref 70–99)
GLUCOSE BLD-MCNC: 137 MG/DL (ref 70–99)
GLUCOSE BLD-MCNC: 157 MG/DL (ref 70–99)
GLUCOSE BLD-MCNC: 344 MG/DL (ref 70–99)
GLUCOSE BLD-MCNC: 369 MG/DL (ref 70–99)
GLUCOSE BLD-MCNC: 384 MG/DL (ref 70–99)
GLUCOSE BLD-MCNC: 74 MG/DL (ref 70–99)
LV EF: 53 %
LVEF MODALITY: NORMAL
PERFORMED ON: ABNORMAL
PERFORMED ON: NORMAL
POTASSIUM SERPL-SCNC: 4.4 MEQ/L (ref 3.4–4.9)
SODIUM BLD-SCNC: 127 MEQ/L (ref 135–144)

## 2023-02-17 PROCEDURE — 99222 1ST HOSP IP/OBS MODERATE 55: CPT | Performed by: INTERNAL MEDICINE

## 2023-02-17 PROCEDURE — 2580000003 HC RX 258: Performed by: INTERNAL MEDICINE

## 2023-02-17 PROCEDURE — 36415 COLL VENOUS BLD VENIPUNCTURE: CPT

## 2023-02-17 PROCEDURE — 93308 TTE F-UP OR LMTD: CPT

## 2023-02-17 PROCEDURE — 6370000000 HC RX 637 (ALT 250 FOR IP): Performed by: INTERNAL MEDICINE

## 2023-02-17 PROCEDURE — 6370000000 HC RX 637 (ALT 250 FOR IP)

## 2023-02-17 PROCEDURE — 6360000002 HC RX W HCPCS: Performed by: INTERNAL MEDICINE

## 2023-02-17 PROCEDURE — 80048 BASIC METABOLIC PNL TOTAL CA: CPT

## 2023-02-17 PROCEDURE — 2060000000 HC ICU INTERMEDIATE R&B

## 2023-02-17 RX ORDER — INSULIN GLARGINE 100 [IU]/ML
20 INJECTION, SOLUTION SUBCUTANEOUS 2 TIMES DAILY
Status: DISCONTINUED | OUTPATIENT
Start: 2023-02-17 | End: 2023-02-18 | Stop reason: HOSPADM

## 2023-02-17 RX ORDER — INSULIN LISPRO 100 [IU]/ML
10 INJECTION, SOLUTION INTRAVENOUS; SUBCUTANEOUS
Status: DISCONTINUED | OUTPATIENT
Start: 2023-02-17 | End: 2023-02-18 | Stop reason: HOSPADM

## 2023-02-17 RX ORDER — MIDODRINE HYDROCHLORIDE 2.5 MG/1
5 TABLET ORAL
Status: DISCONTINUED | OUTPATIENT
Start: 2023-02-17 | End: 2023-02-18 | Stop reason: HOSPADM

## 2023-02-17 RX ADMIN — INSULIN LISPRO 6 UNITS: 100 INJECTION, SOLUTION INTRAVENOUS; SUBCUTANEOUS at 08:37

## 2023-02-17 RX ADMIN — PANTOPRAZOLE SODIUM 20 MG: 20 TABLET, DELAYED RELEASE ORAL at 08:36

## 2023-02-17 RX ADMIN — INSULIN GLARGINE 20 UNITS: 100 INJECTION, SOLUTION SUBCUTANEOUS at 13:40

## 2023-02-17 RX ADMIN — RANOLAZINE 500 MG: 500 TABLET, FILM COATED, EXTENDED RELEASE ORAL at 08:37

## 2023-02-17 RX ADMIN — Medication 10 ML: at 08:36

## 2023-02-17 RX ADMIN — INSULIN LISPRO 8 UNITS: 100 INJECTION, SOLUTION INTRAVENOUS; SUBCUTANEOUS at 12:20

## 2023-02-17 RX ADMIN — POLYETHYLENE GLYCOL 3350 17 G: 17 POWDER, FOR SOLUTION ORAL at 12:20

## 2023-02-17 RX ADMIN — Medication 10 MG: at 22:49

## 2023-02-17 RX ADMIN — MIDODRINE HYDROCHLORIDE 5 MG: 2.5 TABLET ORAL at 21:45

## 2023-02-17 RX ADMIN — SERTRALINE HYDROCHLORIDE 50 MG: 50 TABLET ORAL at 08:36

## 2023-02-17 RX ADMIN — INSULIN LISPRO 10 UNITS: 100 INJECTION, SOLUTION INTRAVENOUS; SUBCUTANEOUS at 13:39

## 2023-02-17 RX ADMIN — RANOLAZINE 500 MG: 500 TABLET, FILM COATED, EXTENDED RELEASE ORAL at 21:45

## 2023-02-17 RX ADMIN — METOPROLOL TARTRATE 25 MG: 25 TABLET, FILM COATED ORAL at 08:36

## 2023-02-17 RX ADMIN — SODIUM CHLORIDE, PRESERVATIVE FREE 10 ML: 5 INJECTION INTRAVENOUS at 21:43

## 2023-02-17 RX ADMIN — HEPARIN SODIUM 5000 UNITS: 5000 INJECTION INTRAVENOUS; SUBCUTANEOUS at 21:43

## 2023-02-17 RX ADMIN — ATORVASTATIN CALCIUM 40 MG: 40 TABLET, FILM COATED ORAL at 21:45

## 2023-02-17 RX ADMIN — HEPARIN SODIUM 5000 UNITS: 5000 INJECTION INTRAVENOUS; SUBCUTANEOUS at 07:08

## 2023-02-17 RX ADMIN — NEPHROCAP 1 MG: 1 CAP ORAL at 08:36

## 2023-02-17 RX ADMIN — HEPARIN SODIUM 5000 UNITS: 5000 INJECTION INTRAVENOUS; SUBCUTANEOUS at 13:40

## 2023-02-17 RX ADMIN — ACETAMINOPHEN 650 MG: 325 TABLET ORAL at 21:44

## 2023-02-17 RX ADMIN — ASPIRIN 81 MG: 81 TABLET, COATED ORAL at 08:37

## 2023-02-17 RX ADMIN — INSULIN GLARGINE 20 UNITS: 100 INJECTION, SOLUTION SUBCUTANEOUS at 21:46

## 2023-02-17 ASSESSMENT — PAIN SCALES - GENERAL: PAINLEVEL_OUTOF10: 0

## 2023-02-17 NOTE — PROGRESS NOTES
MUSIC THERAPY NOTE    Found pt in bed watching television. Pt welcomed music therapy, saying she preferred old country music such as Vijay [place] and Carissa Malagon. To raise pt mood, this music therapist facilitated an intervention of pt preferred song, \"Heena. \"  After song, pt smile and her tone of voice was brighter, saying \"That was really good. \"    Will follow until d/c

## 2023-02-17 NOTE — PROGRESS NOTES
Nephrology Progress Note    Assessment:  ESRDX  OHDX % blockLAD  DM type-2  Hx SChizophtrenia  Anemia      Plan: dialysis tomorrow await further decisions Re CAD    Patient Active Problem List:     Atherosclerotic heart disease of native coronary artery with unspecified angina pectoris (HCC)     Schizophrenia, paranoid, chronic     Metabolic syndrome     Vitamin B 12 deficiency     Cerebral microvascular disease     Mixed hyperlipidemia     Other hammer toe (acquired)     Vitamin D insufficiency     Incontinence of urine     Diabetic nephropathy with proteinuria ([address]     Essential (primary) hypertension     History of type C viral hepatitis     Urinary incontinence due to cognitive impairment     History of seizures     Stented coronary artery-plan is to stay on Plavix indefinately per Dr Yolande Yanez     Other specified diabetes mellitus with diabetic neuropathy, unspecified ([address]     Controlled type 2 diabetes mellitus with diabetic neuropathy, with long-term current use of insulin (HCC)     Hemiparesis, left (HCC)     Angina, class II ([address]     Pain, unspecified     Tardive dyskinesia     Shortness of breath     Uncontrolled type 2 diabetes mellitus with hyperglycemia (HCC)     Chronic diastolic congestive heart failure (HCC)     Sleep apnea, unspecified     Pulmonary hypertension, unspecified (HCC)     Class 2 severe obesity with serious comorbidity and body mass index (BMI) of 36.0 to 36.9 in [facility])     Edema     Closed supracondylar fracture of right humerus     Other chronic pain     Palliative care patient     Recurrent falls     Renal failure     Difficulty in walking     ESRD (end stage renal disease) on dialysis ([address]     Weakness     Other seizures (HCC)     Moderate persistent asthma without complication     [id number]     Post PTCA     Falls frequently     Chest wall contusion, left, initial encounter     Headache, unspecified     Paranoid schizophrenia (HCC)     Compression fracture of spine [facility])     Closed rib fracture     Depression     Chronic obstructive pulmonary disease (HCC)     Critical illness polyneuropathy ([facility])     Multiple closed fractures of ribs of right side     Nonrheumatic mitral valve regurgitation     Nonrheumatic tricuspid valve regurgitation     Need for extended care facility     Chronic pain of both shoulders     Hemodialysis-associated hypotension     Anginal chest pain at rest [facility])     Chest pain     Unstable angina ([facility])     NSTEMI (non-ST elevated myocardial infarction) ([facility])     CKD (chronic kidney disease) stage 4, GFR 15-29 ml/min ([facility])     Hyperkalemia     Impaired mobility and activities of daily living dt polyneuropathy and reccent fall      Dialysis patient [facility])     Unspecified open wound of right upper arm, initial encounter     Multiple closed fractures of right lower extremity and ribs     Closed T11 fracture ([facility])     Encephalopathy acute     MRSA bacteremia     Sepsis due to Enterococcus ([address]     Local infection due to central venous catheter     DJD (degenerative joint disease), cervical     Closed head injury     Sarcopenia     Fall from standing     Septicemia ([address]     Chronic hepatitis C ([address]     Catheter-related bloodstream infection     Enterococcus faecalis infection     Cervical stenosis of spinal canal     Ataxic gait     Lumbar stenosis with neurogenic claudication     Falls infrequently     Infection of venous access port      Subjective:  Admit Date: 2/15/2023    Interval History: stable    Medications:  Scheduled Meds:   atorvastatin  40 mg Oral Nightly    b complex-C-folic acid  1 capsule Oral Daily    pantoprazole  20 mg Oral Daily    sertraline  50 mg Oral Daily    insulin lispro  0-8 Units SubCUTAneous TID WC    insulin lispro  0-4 Units SubCUTAneous Nightly    heparin (porcine)  5,000 Units SubCUTAneous 3 times per day    sodium chloride flush  5-40 mL IntraVENous 2 times per day    sodium chloride flush  5-40 mL IntraVENous 2 times per day aspirin EC  81 mg Oral Daily    metoprolol tartrate  25 mg Oral BID    ranolazine  500 mg Oral BID     Continuous Infusions:   dextrose      sodium chloride 75 mL/hr at 02/16/23 2307    sodium chloride      sodium chloride         CBC:   Recent Labs     02/15/23  1130   WBC 9.8   HGB 14.2        CMP:    Recent Labs     02/15/23  1130   *   K 4.1   CL 94*   CO2 24   BUN 23   CREATININE 3.76*   GLUCOSE 400*   CALCIUM 9.7   LABGLOM 12.8*     Troponin:   Recent Labs     02/16/23  2014   TROPONINI 0.037*     BNP: No results for input(s): BNP in the last 72 hours. INR:   Recent Labs     02/15/23  1130   INR 1.1     Lipids: No results for input(s): CHOL, LDLDIRECT, TRIG, HDL, AMYLASE, LIPASE in the last 72 hours. Liver:   Recent Labs     02/15/23  1130   AST 29   ALT 25   ALKPHOS 208*   PROT 7.4   LABALBU 3.6   BILITOT 0.5     Iron:  No results for input(s): IRONS, FERRITIN in the last 72 hours. Invalid input(s): LABIRONS  Urinalysis: No results for input(s): UA in the last 72 hours.     Objective:  Vitals: BP (!) 108/47   Pulse 67   Temp 97.7 °F (36.5 °C) (Oral)   Resp 18   Ht 5' 7\" (1.702 m)   Wt 191 lb 4.8 oz (86.8 kg)   LMP  (LMP Unknown)   SpO2 100%   BMI 29.96 kg/m²    Wt Readings from Last 3 Encounters:   02/17/23 191 lb 4.8 oz (86.8 kg)   11/12/22 185 lb (83.9 kg)   10/18/22 184 lb (83.5 kg)      24HR INTAKE/OUTPUT:    Intake/Output Summary (Last 24 hours) at 2/17/2023 0902  Last data filed at 2/17/2023 0854  Gross per 24 hour   Intake 440 ml   Output --   Net 440 ml       General: alert, in no apparent distress  HEENT: normocephalic, atraumatic, anicteric  Neck: supple, no mass  Lungs: non-labored respirations, clear to auscultation bilaterally  Heart: regular rate and rhythm, no murmurs or rubs  Abdomen: soft, non-tender, non-distended  Ext: no cyanosis, no peripheral edema  Neuro: alert and oriented, no gross abnormalities  Psych: normal mood and affect  Skin: no rash      Electronically signed by Laila Howard DO, MD

## 2023-02-17 NOTE — FLOWSHEET NOTE
1298- Assessment complete. Patient alert and oriented X 4. Medications given as ordered. 1500- Per DR. Gurmeet river patient's discharge until tomorrow. Electronically signed by Mayela Braden on 2/17/2023 at 3:02 PM  9990 0927- Patient states she feels funny, diaphoretic and dizzy. BP stable. Blood sugar rechecked = 74. Patient sitting up in bed eating dinner right now. Patient also given juice. Electronically signed by Mayela Braden on 2/17/2023 at 4:36 PM  1720- Patient ate all her dinner and drank all the juice. Patient states she still doesn't feel good. Blood sugar recheck = 137. Electronically signed by Mayela Braden on 2/17/2023 at 5:22 PM

## 2023-02-17 NOTE — PROGRESS NOTES
CLINICAL PHARMACY NOTE: MEDS TO BEDS    Total # of Prescriptions Filled: 1   The following medications were delivered to the patient:  Metoprolol Tartrate 25 mg tab     Additional Documentation:

## 2023-02-17 NOTE — PROGRESS NOTES
Hospitalist Progress Note      Date of Admission: 2/15/2023  Chief Complaint:    Chief Complaint   Patient presents with    Chest Pain     2 days    Shortness of Breath     Since yesterday     Subjective:  No new complaints.   No nausea, vomiting, chest pain, or headache      Medications:    Infusion Medications    dextrose      sodium chloride 75 mL/hr at 02/16/23 1551    sodium chloride      sodium chloride       Scheduled Medications    atorvastatin  40 mg Oral Nightly    b complex-C-folic acid  1 capsule Oral Daily    pantoprazole  20 mg Oral Daily    sertraline  50 mg Oral Daily    insulin lispro  0-8 Units SubCUTAneous TID WC    insulin lispro  0-4 Units SubCUTAneous Nightly    [START ON 2/17/2023] heparin (porcine)  5,000 Units SubCUTAneous 3 times per day    sodium chloride flush  5-40 mL IntraVENous 2 times per day    sodium chloride flush  5-40 mL IntraVENous 2 times per day    aspirin EC  81 mg Oral Daily    metoprolol tartrate  25 mg Oral BID    ranolazine  500 mg Oral BID     PRN Meds: melatonin, glucose, dextrose bolus **OR** dextrose bolus, glucagon (rDNA), dextrose, heparin (porcine), sodium chloride flush, sodium chloride, acetaminophen, fentanNYL, midazolam, ondansetron, hydrALAZINE, labetalol, sodium chloride flush, sodium chloride, ondansetron **OR** ondansetron, polyethylene glycol, acetaminophen **OR** acetaminophen, nitroGLYCERIN    Intake/Output Summary (Last 24 hours) at 2/16/2023 2237  Last data filed at 2/16/2023 1700  Gross per 24 hour   Intake 240 ml   Output --   Net 240 ml     Exam:  BP (!) 118/52   Pulse 72   Temp 97.5 °F (36.4 °C) (Oral)   Resp 18   Ht 5' 7\" (1.702 m)   Wt 192 lb (87.1 kg)   LMP  (LMP Unknown)   SpO2 99%   BMI 30.07 kg/m²   Head: Normocephalic, atraumatic  Sclera clear  Neck JVD flat  Lungs: normal effort of breathing    Labs:   Recent Labs     02/15/23  1130   WBC 9.8   HGB 14.2   HCT 42.5        Recent Labs     02/15/23  1130   *   K 4.1   CL 94*   CO2 24   BUN 23   CREATININE 3.76*   CALCIUM 9.7   AST 29   ALT 25   BILITOT 0.5   ALKPHOS 208*     Recent Labs     02/15/23  1130   INR 1.1     Recent Labs     02/15/23  1130 02/15/23  1700 02/16/23  0526 02/16/23  0837 02/16/23 2014   CKTOTAL 46  --   --   --   --    TROPONINI 0.043*   < > 0.031* <0.010 0.037*    < > = values in this interval not displayed. Radiology:  XR CHEST PORTABLE   Final Result   Mild CHF. Assessment/Plan:    Chest pain with angina symptoms, with exertion.    For cath today  Cad and cabg    Esrd: HD in coord with nephrology    HTN: monitor BP, adjust meds as needed  DM: monitor glucose accordion, titrate meds as needed    35 minutes total care time, >1/2 in unit/floor time and care coordination       Abimael Rose MD ,MD

## 2023-02-18 VITALS
WEIGHT: 197 LBS | TEMPERATURE: 97.7 F | BODY MASS INDEX: 30.92 KG/M2 | HEART RATE: 58 BPM | HEIGHT: 67 IN | SYSTOLIC BLOOD PRESSURE: 120 MMHG | RESPIRATION RATE: 18 BRPM | DIASTOLIC BLOOD PRESSURE: 43 MMHG | OXYGEN SATURATION: 100 %

## 2023-02-18 LAB
ANION GAP SERPL CALCULATED.3IONS-SCNC: 14 MEQ/L (ref 9–15)
BUN BLDV-MCNC: 62 MG/DL (ref 8–23)
CALCIUM SERPL-MCNC: 8.6 MG/DL (ref 8.5–9.9)
CHLORIDE BLD-SCNC: 95 MEQ/L (ref 95–107)
CO2: 23 MEQ/L (ref 20–31)
CREAT SERPL-MCNC: 6.03 MG/DL (ref 0.5–0.9)
GFR SERPL CREATININE-BSD FRML MDRD: 7.2 ML/MIN/{1.73_M2}
GLUCOSE BLD-MCNC: 109 MG/DL (ref 70–99)
GLUCOSE BLD-MCNC: 120 MG/DL (ref 70–99)
GLUCOSE BLD-MCNC: 170 MG/DL (ref 70–99)
GLUCOSE BLD-MCNC: 69 MG/DL (ref 70–99)
HBA1C MFR BLD: 7.6 % (ref 4.8–5.9)
PERFORMED ON: ABNORMAL
POTASSIUM SERPL-SCNC: 4.9 MEQ/L (ref 3.4–4.9)
SODIUM BLD-SCNC: 132 MEQ/L (ref 135–144)

## 2023-02-18 PROCEDURE — 36415 COLL VENOUS BLD VENIPUNCTURE: CPT

## 2023-02-18 PROCEDURE — 80048 BASIC METABOLIC PNL TOTAL CA: CPT

## 2023-02-18 PROCEDURE — 6370000000 HC RX 637 (ALT 250 FOR IP): Performed by: INTERNAL MEDICINE

## 2023-02-18 PROCEDURE — 83036 HEMOGLOBIN GLYCOSYLATED A1C: CPT

## 2023-02-18 PROCEDURE — 90935 HEMODIALYSIS ONE EVALUATION: CPT

## 2023-02-18 PROCEDURE — 99232 SBSQ HOSP IP/OBS MODERATE 35: CPT | Performed by: INTERNAL MEDICINE

## 2023-02-18 PROCEDURE — 6370000000 HC RX 637 (ALT 250 FOR IP)

## 2023-02-18 PROCEDURE — 2580000003 HC RX 258: Performed by: INTERNAL MEDICINE

## 2023-02-18 PROCEDURE — 6360000002 HC RX W HCPCS: Performed by: INTERNAL MEDICINE

## 2023-02-18 RX ADMIN — HEPARIN SODIUM 5000 UNITS: 5000 INJECTION INTRAVENOUS; SUBCUTANEOUS at 05:35

## 2023-02-18 RX ADMIN — SERTRALINE HYDROCHLORIDE 50 MG: 50 TABLET ORAL at 07:54

## 2023-02-18 RX ADMIN — PANTOPRAZOLE SODIUM 20 MG: 20 TABLET, DELAYED RELEASE ORAL at 07:54

## 2023-02-18 RX ADMIN — INSULIN LISPRO 10 UNITS: 100 INJECTION, SOLUTION INTRAVENOUS; SUBCUTANEOUS at 12:15

## 2023-02-18 RX ADMIN — ASPIRIN 81 MG: 81 TABLET, COATED ORAL at 07:54

## 2023-02-18 RX ADMIN — ACETAMINOPHEN 650 MG: 325 TABLET ORAL at 07:53

## 2023-02-18 RX ADMIN — INSULIN LISPRO 10 UNITS: 100 INJECTION, SOLUTION INTRAVENOUS; SUBCUTANEOUS at 07:59

## 2023-02-18 RX ADMIN — SODIUM CHLORIDE, PRESERVATIVE FREE 10 ML: 5 INJECTION INTRAVENOUS at 08:01

## 2023-02-18 RX ADMIN — METOPROLOL TARTRATE 25 MG: 25 TABLET, FILM COATED ORAL at 07:53

## 2023-02-18 RX ADMIN — INSULIN GLARGINE 20 UNITS: 100 INJECTION, SOLUTION SUBCUTANEOUS at 07:59

## 2023-02-18 RX ADMIN — RANOLAZINE 500 MG: 500 TABLET, FILM COATED, EXTENDED RELEASE ORAL at 07:54

## 2023-02-18 RX ADMIN — NEPHROCAP 1 MG: 1 CAP ORAL at 07:54

## 2023-02-18 ASSESSMENT — PAIN SCALES - GENERAL
PAINLEVEL_OUTOF10: 6
PAINLEVEL_OUTOF10: 6

## 2023-02-18 ASSESSMENT — PAIN DESCRIPTION - LOCATION: LOCATION: CHEST

## 2023-02-18 ASSESSMENT — ENCOUNTER SYMPTOMS
GASTROINTESTINAL NEGATIVE: 1
SHORTNESS OF BREATH: 1
VOMITING: 0
ALLERGIC/IMMUNOLOGIC NEGATIVE: 1
EYES NEGATIVE: 1
NAUSEA: 0
ABDOMINAL PAIN: 0
WHEEZING: 0

## 2023-02-18 NOTE — DISCHARGE SUMMARY
Hospital Medicine Discharge Summary    Fely Dukes  :  1960  MRN:  [id number]    Admit date:  2/15/2023  Discharge date:  2023    Admitting Physician:  Neeru Whitney MD  Primary Care Physician:  Malka Ashby MD    Fely Dukes is a 70 y.o. female that was admitted and treated for the following medical issues:     Principal Problem:    Chest pain  Active Problems:    Inadequately controlled diabetes mellitus ([address]  Resolved Problems:    * No resolved hospital problems. *      Discharge Diagnoses:    Principal Problem:    Chest pain  Active Problems:    Inadequately controlled diabetes mellitus ([address]  Resolved Problems:    * No resolved hospital problems. *    Chief Complaint   Patient presents with    Chest Pain     2 days    Shortness of Breath     Since yesterday       Hospital Course:   Fely Dukes is a 70 y.o. female who was admitted to the hospital with chest pain, exertional.  History of CAD and CABG. Cardiac catheterization performed by cardiology. No further intervention, outpatient follow-up. Hemodialysis for end-stage renal disease managed by nephrology. Pt was discharge in a stable condition. BP (!) 104/50   Pulse 59   Temp 97.5 °F (36.4 °C)   Resp 18   Ht 5' 7\" (1.702 m)   Wt 197 lb (89.4 kg)   LMP  (LMP Unknown)   SpO2 100%   BMI 30.85 kg/m²     Patient was seen by the following consultants  Consults:  IP CONSULT TO CARDIOLOGY  IP CONSULT TO HOSPITALIST  IP CONSULT TO NEPHROLOGY  IP CONSULT TO CARDIOLOGY  IP CONSULT TO ENDOCRINOLOGY    Significant Diagnostic Studies:    Refer to chart if  XR CHEST PORTABLE    Result Date: 2/15/2023  EXAMINATION: ONE XRAY VIEW OF THE CHEST 2/15/2023 11:33 am COMPARISON: None. HISTORY: ORDERING SYSTEM PROVIDED HISTORY: CP, SOB on exertion, TECHNOLOGIST PROVIDED HISTORY: Reason for exam:->CP, SOB on exertion, What reading provider will be dictating this exam?->CRC FINDINGS: There is cardiomegaly with vascular congestion. There is minimal atelectasis and small pleural effusions in the lung bases. Left IJ dialysis catheter is noted. Mild CHF. Discharge Medications:         Medication List        START taking these medications      metoprolol tartrate 25 MG tablet  Commonly known as: LOPRESSOR  Take 0.5 tablets by mouth 2 times daily HOLD for SBP<100 or HR<60            CHANGE how you take these medications      ARIPiprazole 5 MG tablet  Commonly known as: ABILIFY  TAKE 1 TABLET BY MOUTH ONCE DAILY  What changed: See the new instructions. Lantus SoloStar 100 UNIT/ML injection pen  Generic drug: insulin glargine  INJECT 55 UNITS SUBCUTANEOUSLY AT BEDTIME  What changed:   how much to take  how to take this  when to take this     midodrine 5 MG tablet  Commonly known as: PROAMATINE  Take 1 tablet by mouth one time a day every Tue, Thu, Sat for hypotension. Give 30 mins prior to dialysis.   What changed:   how much to take  how to take this  when to take this            CONTINUE taking these medications      acetaminophen 325 MG tablet  Commonly known as: TYLENOL  Take 2 tablets by mouth every 4 hours as needed for Pain (For mild to moderate pain (Pain 1-6 out of 10 on pain scale))     albuterol 2.5 MG/0.5ML Nebu nebulizer solution  Commonly known as: PROVENTIL     aspirin EC 81 MG EC tablet  Take 1 tablet by mouth daily     atorvastatin 40 MG tablet  Commonly known as: LIPITOR  Take 1 tablet by mouth nightly     b complex-C-folic acid 1 MG capsule     Easy Touch Alcohol Prep Medium 70 % Pads  USE AS DIRECTED THREE TIMES A DAY     * FreeStyle Lancets Misc  Test 4x daily     * OneTouch Delica Lancets 62M Misc  QID     * FreeStyle Lite [place]  1 Device by Does not apply route daily as needed (Diabetes) Use freestyle meter to test blood sugar as needed     * OneTouch Verio w/Device Kit  AS DIRECTED     * FREESTYLE LITE strip  Generic drug: blood glucose test strips  1 each by Does not apply route 4 times daily (before meals and nightly) As needed. * OneTouch Verio strip  Generic drug: blood glucose test strips  QID     Handicap Placard Misc  by Does not apply route Expiration in 5 years. hydrOXYzine HCl 25 MG tablet  Commonly known as: ATARAX     * insulin lispro 100 UNIT/ML injection vial  Commonly known as: HUMALOG     * insulin lispro 100 UNIT/ML injection vial  Commonly known as: HUMALOG     * insulin lispro 100 UNIT/ML injection vial  Commonly known as: HUMALOG     LACTOBACILLUS PO     loperamide 2 MG tablet  Commonly known as: IMODIUM A-D     melatonin 3 MG Tabs tablet     nitroGLYCERIN 0.4 MG SL tablet  Commonly known as: Nitrostat  up to max of 3 total doses. If no relief after 1 dose, call 911. pantoprazole 20 MG tablet  Commonly known as: PROTONIX     sertraline 50 MG tablet  Commonly known as: ZOLOFT  Take 1 tablet by mouth nightly     Sure Comfort Pen Needles 30G X 8 MM Misc  Generic drug: Insulin Pen Needle  USE AS DIRECTED FIVE TIMES A DAILY     traMADol 50 MG tablet  Commonly known as: ULTRAM           * This list has 9 medication(s) that are the same as other medications prescribed for you. Read the directions carefully, and ask your doctor or other care provider to review them with you. Where to Get Your Medications        These medications were sent to [address]      Phone: [phone]   metoprolol tartrate 25 MG tablet           Disposition:   If discharged to Home, Any [place] [id number] needs that were indicated and/or required as been addressed and set up by Social Work. Condition at discharge: Pt was medically stable at the time of discharge. Activity: activity as tolerated    Total time taken for discharging this patient: 40 minutes. Greater than 70% of time was spent focused exclusively on this patient.  Time was taken to review chart, discuss plans with consultants, reconciling medications, discussing plan answering questions with patient.      Signed:  Kenny Caruso MD

## 2023-02-18 NOTE — PROGRESS NOTES
Nephrology Progress Note    Assessment:  ESRDX  OHDX Ischemic disease  DM type-2  Pad  Anemia  Hx Schizophrenia  Hypotension dialysis       Plan:  scheduled for dialysis than home    Patient Active Problem List:     Atherosclerotic heart disease of native coronary artery with unspecified angina pectoris (HCC)     Schizophrenia, paranoid, chronic     Metabolic syndrome     Vitamin B 12 deficiency     Cerebral microvascular disease     Mixed hyperlipidemia     Other hammer toe (acquired)     Vitamin D insufficiency     Incontinence of urine     Diabetic nephropathy with proteinuria ([address]     Essential (primary) hypertension     History of type C viral hepatitis     Urinary incontinence due to cognitive impairment     History of seizures     Stented coronary artery-plan is to stay on Plavix indefinately per Dr Yolande Yanez     Inadequately controlled diabetes mellitus ([address]     Controlled type 2 diabetes mellitus with diabetic neuropathy, with long-term current use of insulin (HCC)     Hemiparesis, left (HCC)     Angina, class II (HCC)     Pain, unspecified     Tardive dyskinesia     Shortness of breath     Uncontrolled type 2 diabetes mellitus with hyperglycemia (HCC)     Chronic diastolic congestive heart failure (HCC)     Sleep apnea, unspecified     Pulmonary hypertension, unspecified (HCC)     Class 2 severe obesity with serious comorbidity and body mass index (BMI) of 36.0 to 36.9 in adult [facility])     Edema     Closed supracondylar fracture of right humerus     Other chronic pain     Palliative care patient     Recurrent falls     Renal failure     Difficulty in walking     ESRD (end stage renal disease) on dialysis ([address]     Weakness     Other seizures (HCC)     Moderate persistent asthma without complication     [id number]     Post PTCA     Falls frequently     Chest wall contusion, left, initial encounter     Headache, unspecified     Paranoid schizophrenia ([address]     Compression fracture of spine ([address]     Closed rib fracture     Depression     Chronic obstructive pulmonary disease (HCC)     Critical illness polyneuropathy ([facility])     Multiple closed fractures of ribs of right side     Nonrheumatic mitral valve regurgitation     Nonrheumatic tricuspid valve regurgitation     Need for extended care facility     Chronic pain of both shoulders     Hemodialysis-associated hypotension     Anginal chest pain at rest [facility])     Chest pain     Unstable angina ([facility])     NSTEMI (non-ST elevated myocardial infarction) ([facility])     CKD (chronic kidney disease) stage 4, GFR 15-29 ml/min ([facility])     Hyperkalemia     Impaired mobility and activities of daily living dt polyneuropathy and reccent fall      Dialysis patient [facility])     Unspecified open wound of right upper arm, initial encounter     Multiple closed fractures of right lower extremity and ribs     Closed T11 fracture ([facility])     Encephalopathy acute     MRSA bacteremia     Sepsis due to Enterococcus ([address]     Local infection due to central venous catheter     DJD (degenerative joint disease), cervical     Closed head injury     Sarcopenia     Fall from standing     Septicemia ([address]     Chronic hepatitis C ([address]     Catheter-related bloodstream infection     Enterococcus faecalis infection     Cervical stenosis of spinal canal     Ataxic gait     Lumbar stenosis with neurogenic claudication     Falls infrequently     Infection of venous access port      Subjective:  Admit Date: 2/15/2023    Interval History: feeling well  says chest pain constant    Medications:  Scheduled Meds:   insulin glargine  20 Units SubCUTAneous BID    insulin lispro  10 Units SubCUTAneous TID WC    midodrine  5 mg Oral Once per day on Mon Wed Fri    atorvastatin  40 mg Oral Nightly    b complex-C-folic acid  1 capsule Oral Daily    pantoprazole  20 mg Oral Daily    sertraline  50 mg Oral Daily    insulin lispro  0-8 Units SubCUTAneous TID WC    insulin lispro  0-4 Units SubCUTAneous Nightly    heparin (porcine)  5,000 Units SubCUTAneous 3 times per day    sodium chloride flush  5-40 mL IntraVENous 2 times per day    sodium chloride flush  5-40 mL IntraVENous 2 times per day    aspirin EC  81 mg Oral Daily    metoprolol tartrate  25 mg Oral BID    ranolazine  500 mg Oral BID     Continuous Infusions:   dextrose      sodium chloride      sodium chloride         CBC:   Recent Labs     02/15/23  1130   WBC 9.8   HGB 14.2        CMP:    Recent Labs     02/15/23  1130 02/17/23  0836 02/18/23  0506   * 127* 132*   K 4.1 4.4 4.9   CL 94* 92* 95   CO2 24 23 23   BUN 23 45* 62*   CREATININE 3.76* 4.79* 6.03*   GLUCOSE 400* 384* 109*   CALCIUM 9.7 8.6 8.6   LABGLOM 12.8* 9.5* 7.2*     Troponin:   Recent Labs     02/16/23 2014   TROPONINI 0.037*     BNP: No results for input(s): BNP in the last 72 hours. INR:   Recent Labs     02/15/23  1130   INR 1.1     Lipids: No results for input(s): CHOL, LDLDIRECT, TRIG, HDL, AMYLASE, LIPASE in the last 72 hours. Liver:   Recent Labs     02/15/23  1130   AST 29   ALT 25   ALKPHOS 208*   PROT 7.4   LABALBU 3.6   BILITOT 0.5     Iron:  No results for input(s): IRONS, FERRITIN in the last 72 hours. Invalid input(s): LABIRONS  Urinalysis: No results for input(s): UA in the last 72 hours.     Objective:  Vitals: BP (!) 104/50   Pulse 59   Temp 97.5 °F (36.4 °C)   Resp 18   Ht 5' 7\" (1.702 m)   Wt 197 lb (89.4 kg)   LMP  (LMP Unknown)   SpO2 100%   BMI 30.85 kg/m²    Wt Readings from Last 3 Encounters:   02/18/23 197 lb (89.4 kg)   11/12/22 185 lb (83.9 kg)   10/18/22 184 lb (83.5 kg)      24HR INTAKE/OUTPUT:    Intake/Output Summary (Last 24 hours) at 2/18/2023 1040  Last data filed at 2/17/2023 1721  Gross per 24 hour   Intake 760 ml   Output --   Net 760 ml       General: alert, in no apparent distress  HEENT: normocephalic, atraumatic, anicteric  Neck: supple, no mass  Lungs: non-labored respirations, clear to auscultation bilaterally  Heart: regular rate and rhythm, no murmurs or rubs  Abdomen: soft, non-tender, non-distended  Ext: no cyanosis, no peripheral edema  Neuro: alert and oriented, no gross abnormalities  Psych: normal mood and affect  Skin: no rash      Electronically signed by Sherren Mayans, DO, MD

## 2023-02-18 NOTE — CARE COORDINATION
Call to pt room to confirm dc plan is home with [facility]. Pt states her daughter will pick her up on discharge. Updated that resume orders were sent to [facility] yesterday. Reviewed second IMM with pt and pt gave verbal consent.

## 2023-02-18 NOTE — CONSULTS
[address]                      [facility], [address] [facility]                                  CONSULTATION    PATIENT NAME: Beryl Madera                  :        1960  MED REC NO:   [id number]                            ROOM:       [id number]  ACCOUNT NO:   [de-identified]                           ADMIT DATE: 02/15/2023  PROVIDER:     Marj Mccarthy MD    CONSULT DATE:  2023    ENDOCRINE CONSULTATION    REFERRING PROVIDER:  Camilla Melendez MD    REASON FOR CONSULTATION:  Management of uncontrolled type 2 diabetes. CHIEF COMPLAINT AND HISTORY OF PRESENT ILLNESS:  The patient is a  58-year-old female with known history of type 2 diabetes with inadequate  control admitted with shortness of breath and chest pain. The patient  has had type 2 diabetes with multiple complications including history of  heart disease, endstage renal disease on dialysis. Cardiology consult  was reviewed from two days ago, known history of CABG. The patient had  cardiac cath on 2023. Blood sugars have been fluctuating between  200 to 400 range. Chemistries were reviewed from this morning. Sodium 127, potassium 4.4,  chloride was 92, CO2 23, BUN 45, creatinine 4.79. The patient's  hemoglobin A1c was 7.5 from November. The patient was started here on  Lantus 20 units twice a day plus Humalog 10 units with each meals. Blood sugars did come down to the 70s and 100 range. The patient also  is scheduled to be eventually discharged. Home medications include Lantus 55 units at bedtime and Humalog 12 units  with each meals. Compliance has been variable in the past.  Also has  been using [employer] and continuous glucose monitoring. PAST MEDICAL HISTORY:  Significant for coronary artery disease, history  of congestive heart failure, end-stage renal disease on dialysis,  history of left-sided hemiparesis, schizophrenia.     PAST SURGICAL HISTORY:  C-sections, angioplasty with stent placement,  hysterectomy. FAMILY HISTORY:  Diabetes, hypertension, breast cancer, mental illness. PERSONAL AND SOCIAL HISTORY:  Denies any smoking. MEDICATIONS:  Here include Lantus 20 units twice a day, Humalog 10 units  with each meals, Lipitor, Nephrocaps, Lopressor, ProAmatine, Protonix,  Ranexa, Zoloft. ALLERGIES:  Include CODEINE and OXYCONTIN. REVIEW OF SYSTEMS:  Other than chest pain, 14-point review of system is  essentially unremarkable. PHYSICAL EXAMINATION:  GENERAL:  The patient is alert, awake, appears ill, in no obvious  distress. VITAL SIGNS:  Blood pressure was 107/46, pulse rate was 59, respiratory  rate was 18, temperature 97.5. HEENT:  Normocephalic, atraumatic. Pupils are equally reactive to  light. Oral mucosa was dry. NECK:  Supple. Trachea in midline. CHEST:  Lungs were clear to auscultation bilaterally. No wheezing or  crackles were heard. CARDIOVASCULAR:  Heart sounds were normal.  No murmurs or thrills were  present. ABDOMEN:  Soft, slightly obese. Bowel sounds are present. EXTREMITIES:  Lower extremities reveal no edema. SKIN:  Intact. No rashes. MUSCULOSKELETAL:  No joint swelling. NEUROLOGIC:  Unable to complete. PSYCHIATRIC:  Appears to be depressed. LABORATORY DATA:  As above. ASSESSMENT:  Uncontrolled type 2 diabetes with variable compliance,  coronary artery disease, chest pain, end-stage renal disease on  dialysis. PLAN:  Continue with Lantus 20 units twice a day plus Humalog 10 with  each meals. Okay to discharge the patient on her home regimen of Lantus  55 and Humalog 12 with each meals. Get a hemoglobin A1c. Long-term A1c  goal of 7 or lower. Continue other management as per Cardiology and  Nephrology. Okay to discharge the patient, whenever cleared by the  specialist.    Total time spent 60 minutes. Thank you for the consult.         Tommie Hubbard MD    D: 02/17/2023 23:02:11       T: 02/17/2023 23:04:41 [email or web address]  Job#: [id number]     Doc#: [id number]    CC:

## 2023-02-18 NOTE — DISCHARGE INSTR - COC
Continuity of Care Form    Patient Name: Breanna Coleman   :  1960  MRN:  [id number]    Admit date:  2/15/2023  Discharge date:  ***    Code Status Order: Full Code   Advance Directives:     Admitting Physician:  Clay Caballero MD  PCP: Martin Hernandez MD    Discharging Nurse: [facility] Unit/Room#: D697/T058-68  Discharging Unit Phone Number: ***    Emergency Contact:   Extended Emergency Contact Information  Primary Emergency Contact: Hawa Encinas [address] Phone: [phone]  Work Phone: [phone]  Mobile Phone: [phone]  Relation: Child    Past Surgical History:  Past Surgical History:   Procedure Laterality Date     SECTION      x1    COLONOSCOPY  2014    Dr. Areli Sweet      x1 Dr. Tequila Gee, Dr Begum Colon  08/10/2021    [facility]    DIAGNOSTIC CARDIAC CATH LAB PROCEDURE  10/02/2019    DIALYSIS CATHETER INSERTION Left 2022    Tunneled Symetrex 15.5F x 23cm hemodialysis catheter inserted by Dr. Stu Padilla Left 2022    15.6By14mv replamcent tunneld HD cath by Dr. Mikel Enciso, TOTAL ABDOMINAL (CERVIX REMOVED)      one ovary intact, Dr Romero Clark, menorrhagia    IR THROMBECTOMY PERCUT AVF  2022    IR THROMBECTOMY PERCUT AVF 2022 [facility] SPECIAL PROCEDURE    IR TUNNELED CATHETER PLACEMENT GREATER THAN 5 YEARS  2022    IR TUNNELED CATHETER PLACEMENT GREATER THAN 5 YEARS 6/3/2022 [facility] SPECIAL PROCEDURE    IR TUNNELED CATHETER PLACEMENT GREATER THAN 5 YEARS Left 2022 Dr. Bradford Mckay exchanged to 15.5F x 28 cm.      15.5 fr by 23 cm Symetrex dialysis catheter inserted by Dr Bradford Mckay    IR TUNNELED Serafin Puls 5 YEARS  2022    IR TUNNELED CATHETER PLACEMENT GREATER THAN 5 YEARS 2022 [facility] SPECIAL PROCEDURE    [place] ARTHRP KNE CONDYLE&PLATU MEDIAL&LAT COMPARTMENTS Left 06/21/2018    LEFT KNEE TOTAL KNEE ARTHROPLASTY, SHAYNA, NERVE BLOCK performed by Sonal Limon MD at [address] [facility] Right     TOTAL KNEE ARTHROPLASTY  05/19/2016    Dr Althea Cheung    TUNNELED [address] Right 07/01/2020    tunneled HD catheter per Dr Amelia Cantor       Immunization History:   Immunization History   Administered Date(s) Administered    COVID-19, PFIZER PURPLE top, DILUTE for use, (age 15 y+), 30mcg/0.3mL 01/25/2021, 02/15/2021    Hepatitis B 07/21/2020, 08/25/2020, 10/13/2020, 02/09/2021    Hepatitis B Adult (Heplisav-b) 03/15/2022, 04/12/2022    Influenza Vaccine, unspecified formulation 10/14/2016    Influenza Virus Vaccine 10/20/2014, 10/30/2015, 10/14/2016, 09/29/2017, 10/12/2018, 09/29/2020, 09/29/2020    Influenza Whole 10/20/2014    Influenza, AFLURIA (age 1 yrs+), FLUZONE, (age 10 mo+), MDV, 0.5mL 09/29/2017, 10/12/2018    Influenza, FLUARIX, FLULAVAL, FLUZONE (age 10 mo+) AND AFLURIA, (age 1 y+), PF, 0.5mL 10/03/2019    Influenza, FLUCELVAX, (age 10 mo+), MDCK, PF, 0.5mL 09/17/2021    Influenza, Triv, 3 Years and older, IM (Afluria (5 yrs and older) 10/14/2016    Pneumococcal Conjugate 13-valent ([id number]) 10/30/2021    Pneumococcal Polysaccharide (Xnwekcvyf99) 10/15/2016, 09/08/2020    Tdap (Boostrix, Adacel) 08/03/2020       Active Problems:  Patient Active Problem List   Diagnosis Code    Atherosclerotic heart disease of native coronary artery with unspecified angina pectoris (HCC) I25.119    Schizophrenia, paranoid, chronic Y85.1    Metabolic syndrome T15.85    Vitamin B 12 deficiency E53.8    Cerebral microvascular disease I67.89    Mixed hyperlipidemia E78.2    Other hammer toe (acquired) M20.40    Vitamin D insufficiency E55.9    Incontinence of urine R32    Diabetic nephropathy with proteinuria ([facility] E11.21    Essential (primary) hypertension I10    History of type C viral hepatitis Z86.19    Urinary incontinence due to cognitive impairment R39.81    History of seizures Z87.898    Stented coronary artery-plan is to stay on Plavix indefinately per Dr Florence Gupta Z95.5    Inadequately controlled diabetes mellitus ([facility] E11.65    Controlled type 2 diabetes mellitus with diabetic neuropathy, with long-term current use of insulin ([facility]) E11.40, Z79.4    Hemiparesis, left ([facility]) G81.94    Angina, class II ([facility] I20.9    Pain, unspecified R52    Tardive dyskinesia G24.01    Shortness of breath R06.02    Uncontrolled type 2 diabetes mellitus with hyperglycemia ([facility]) E11.65    Chronic diastolic congestive heart failure ([facility]) I50.32    Sleep apnea, unspecified G47.30    Pulmonary hypertension, unspecified ([facility]) I27.20    Class 2 severe obesity with serious comorbidity and body mass index (BMI) of 36.0 to 36.9 in adult ([facility] E66.01, Z68.36    Edema R60.9    Closed supracondylar fracture of right humerus S42.411A    Other chronic pain G89.29    Palliative care patient Z51.5    Recurrent falls R29.6    Renal failure N19    Difficulty in walking R26.2    ESRD (end stage renal disease) on dialysis ([facility] N18.6, Z99.2    Weakness R53.1    Other seizures ([facility]) G40.89    Moderate persistent asthma without complication Y90.81    MUSZP-51 U07.1    Post PTCA Z98.61    Falls frequently R29.6    Chest wall contusion, left, initial encounter S20.212A    Headache, unspecified R51.9    Paranoid schizophrenia ([facility] [facility] F20.0    Compression fracture of spine ([facility] M48.50XA    Closed rib fracture S22.39XA    Depression F32. A    Chronic obstructive pulmonary disease ([facility]) J44.9    Critical illness polyneuropathy ([facility]) G62.81    Multiple closed fractures of ribs of right side S22.41XA    Nonrheumatic mitral valve regurgitation I34.0    Nonrheumatic tricuspid valve regurgitation I36.1    Need for extended care facility Z78.9    Chronic pain of both shoulders M25.511, G89.29, M25.512    Hemodialysis-associated hypotension I95.3    Anginal chest pain at rest [facility]) I20.8    Chest pain R07.9 Unstable angina ([facility]) I20.0    NSTEMI (non-ST elevated myocardial infarction) ([facility]) I21.4    CKD (chronic kidney disease) stage 4, GFR 15-29 ml/min ([facility]) N18.4    Hyperkalemia E87.5    Impaired mobility and activities of daily living dt polyneuropathy and reccent fall  Z74.09, Z78.9    Dialysis patient ([facility] Z99.2    Unspecified open wound of right upper arm, initial encounter S41.101A    Multiple closed fractures of right lower extremity and ribs S82.91XA, S22.41XA    Closed T11 fracture ([facility] S22.089A    Encephalopathy acute G93.40    MRSA bacteremia R78.81, B95.62    Sepsis due to Enterococcus [facility]) A41.81    Local infection due to central venous catheter T80.212A    DJD (degenerative joint disease), cervical M47.812    Closed head injury S09.90XA    Sarcopenia M62.84    Fall from standing W19. XXXA    Septicemia ([facility] A41.9    Chronic hepatitis C ([facility] B18.2    Catheter-related bloodstream infection T80.211A    Enterococcus faecalis infection B95.2    Cervical stenosis of spinal canal M48.02    Ataxic gait R26.0    Lumbar stenosis with neurogenic claudication M48.062    Falls infrequently Z91.81    Infection of venous access port T80.219A       Isolation/Infection:   Isolation            No Isolation          Patient Infection Status       Infection Onset Added Last Indicated Last Indicated By Review Planned Expiration Resolved Resolved By    None active    Resolved    COVID-19 (Rule Out) 02/15/23 02/15/23 02/15/23 COVID-19, Rapid (Ordered)   02/15/23 Rule-Out Test Resulted    COVID-19 (Rule Out) 10/06/22 10/06/22 10/06/22 COVID-19, Rapid (Ordered)   10/06/22 Rule-Out Test Resulted    COVID-19 (Rule Out) 06/30/22 06/30/22 06/30/22 COVID-19, Rapid (Ordered)   06/30/22 Rule-Out Test Resulted    C-diff Rule Out 04/20/22 04/20/22 04/20/22 Clostridium Difficile Toxin/Antigen (Ordered)   04/20/22 Rule-Out Test Resulted    COVID-19 (Rule Out) 01/12/22 01/12/22 01/12/22 COVID-19, Rapid (Ordered)   01/12/22 Rule-Out Test Resulted    COVID-19 (Rule Out) 21 COVID-19, Rapid (Ordered)   21 Rule-Out Test Resulted    COVID-19 (Rule Out) 21 Covid-19 Ambulatory (Ordered)   21 Luz Maria Rosas RN    Per Dr. Jose E Ashford no need for droplet plus isolation    COVID-19 21 COVID-19, Rapid   21 Luz Maria Rosas RN    Isolation status removed per provider    C-diff Rule Out 21 Gastrointestinal Panel by DNA (Ordered)   21 Luz Maria Rosas RN    Order discontinued    COVID-19  21 COVID-19   21     Positive 21. Electronically signed by Samara Poole RN on 21 at 7:22 AM EST       COVID-19 20 COVID-19   21 Samara Poole RN    COVID-19 (Rule Out) 20 COVID-19 (Ordered)   20 Rule-Out Test Resulted    COVID-19 (Rule Out) 20 COVID-19 (Ordered)   20 Rule-Out Test Resulted    COVID-19 (Rule Out) 20 COVID-19 (Ordered)   20 Rule-Out Test Resulted    COVID-19 (Rule Out) 20 COVID-19 (Ordered)   20 Rule-Out Test Resulted    COVID-19 (Rule Out) 20 COVID-19 (Ordered)   20 Rule-Out Test Resulted    Pt has been possitive on December            Nurse Assessment:  Last Vital Signs: BP (!) 104/50   Pulse 59   Temp 97.5 °F (36.4 °C)   Resp 18   Ht 5' 7\" (1.702 m)   Wt 197 lb (89.4 kg)   LMP  (LMP Unknown)   SpO2 100%   BMI 30.85 kg/m²     Last documented pain score (0-10 scale): Pain Level: 6  Last Weight:   Wt Readings from Last 1 Encounters:   23 197 lb (89.4 kg)     Mental Status:  {IP PT MENTAL STATUS:}    IV Access:  {[facility] IV ACCESS:742774380}    Nursing Mobility/ADLs:  Walking   {CHP DME YFRY:131805393}  Transfer  {CHP DME VTCQ:695173124}  Bathing  {CHP DME CXCR:245496181}  Dressing  {CHP DME EUWQ:113882196}  Toileting  {CHP DME HIOS:083596525}  Feeding  {CHP DME JZEC:851114768}  Med Admin  {CHP DME MXIB:983036707}  Med Delivery   { WILIAN MED Delivery:067180312}    Wound Care Documentation and Therapy:        Elimination:  Continence: Bowel: {YES / QH:64867}  Bladder: {YES / CY:84927}  Urinary Catheter: {Urinary Catheter:667951294}   Colostomy/Ileostomy/Ileal Conduit: {YES / BN:40983}       Date of Last BM: ***    Intake/Output Summary (Last 24 hours) at 2023 1637  Last data filed at 2023 1721  Gross per 24 hour   Intake 360 ml   Output --   Net 360 ml     I/O last 3 completed shifts:   In: 12 [P.O.:960]  Out: -     Safety Concerns:     [employer] Safety Concerns:780364615}    Impairments/Disabilities:      [address] [employer] Impairments/Disabilities:705311076}    Nutrition Therapy:  Current Nutrition Therapy:   [employer] Diet List:060470458}    Routes of Feeding: {[facility] DME Other Feedings:931319991}  Liquids: {Slp liquid thickness:78704}  Daily Fluid Restriction: {CHP DME Yes amt example:844527438}  Last Modified Barium Swallow with Video (Video Swallowing Test): {Done Not Done IGPT:784356873}    Treatments at the Time of Hospital Discharge:   Respiratory Treatments: ***  Oxygen Therapy:  {Therapy; copd oxygen:66271}  Ventilator:    { CC Vent XRWF:154319441}    Rehab Therapies: {THERAPEUTIC INTERVENTION:4780988155}  Weight Bearing Status/Restrictions: [employer] Weight Bearin}  Other Medical Equipment (for information only, NOT a DME order):  {EQUIPMENT:519367458}  Other Treatments: ***    Patient's personal belongings (please select all that are sent with patient):  {P DME Belongings:422779380}    RN SIGNATURE:  {Esignature:570162822}    CASE MANAGEMENT/SOCIAL WORK SECTION    Inpatient Status Date: ***    Readmission Risk Assessment Score:  Readmission Risk              Risk of Unplanned Readmission:  40           Discharging to Facility/ Agency   Name:   Address:  Phone:  Fax:    Dialysis Facility (if applicable)   Name:  Address:  Dialysis Schedule:  Phone:  Fax:    / signature: {Esignature:772509982}    PHYSICIAN SECTION    Prognosis: {Prognosis:4087357475}    Condition at Discharge: 50Mercedes Taylor Patient Condition:936239067}    Rehab Potential (if transferring to Rehab): {Prognosis:3448581793}    Recommended Labs or Other Treatments After Discharge: ***    Physician Certification: I certify the above information and transfer of Ilia Retana  is necessary for the continuing treatment of the diagnosis listed and that she requires {Admit to Appropriate Level of Care:04423} for {GREATER/LESS:711385365} 30 days.      Update Admission H&P: {CHP DME Changes in SXYST:193389941}    PHYSICIAN SIGNATURE:  {Esignature:994661738}

## 2023-02-18 NOTE — DISCHARGE INSTR - DIET
Good nutrition is important when healing from an illness, injury, or surgery. Follow any nutrition recommendations given to you during your hospital stay. If you were given an oral nutrition supplement while in the hospital, continue to take this supplement at home. You can take it with meals, in-between meals, and/or before bedtime. These supplements can be purchased at most local grocery stores, pharmacies, and chain [employer]-stores. If you have any questions about your diet or nutrition, call the hospital and ask for the dietitian. Low fat, low salt, heart healthy diet. Diabetic diet.

## 2023-02-18 NOTE — PROGRESS NOTES
Hospitalist Progress Note      Date of Admission: 2/15/2023  Chief Complaint:    Chief Complaint   Patient presents with    Chest Pain     2 days    Shortness of Breath     Since yesterday     Subjective:  No new complaints.   No nausea, vomiting, chest pain, or headache      Medications:    Infusion Medications    dextrose      sodium chloride      sodium chloride       Scheduled Medications    insulin glargine  20 Units SubCUTAneous BID    insulin lispro  10 Units SubCUTAneous TID WC    midodrine  5 mg Oral Once per day on Mon Wed Fri    atorvastatin  40 mg Oral Nightly    b complex-C-folic acid  1 capsule Oral Daily    pantoprazole  20 mg Oral Daily    sertraline  50 mg Oral Daily    insulin lispro  0-8 Units SubCUTAneous TID WC    insulin lispro  0-4 Units SubCUTAneous Nightly    heparin (porcine)  5,000 Units SubCUTAneous 3 times per day    sodium chloride flush  5-40 mL IntraVENous 2 times per day    sodium chloride flush  5-40 mL IntraVENous 2 times per day    aspirin EC  81 mg Oral Daily    metoprolol tartrate  25 mg Oral BID    ranolazine  500 mg Oral BID     PRN Meds: melatonin, glucose, dextrose bolus **OR** dextrose bolus, glucagon (rDNA), dextrose, heparin (porcine), sodium chloride flush, sodium chloride, acetaminophen, ondansetron, hydrALAZINE, labetalol, sodium chloride flush, sodium chloride, ondansetron **OR** ondansetron, polyethylene glycol, acetaminophen **OR** acetaminophen, nitroGLYCERIN    Intake/Output Summary (Last 24 hours) at 2/17/2023 2249  Last data filed at 2/17/2023 1721  Gross per 24 hour   Intake 960 ml   Output --   Net 960 ml       Exam:  BP (!) 108/49   Pulse 59   Temp 97.3 °F (36.3 °C)   Resp 18   Ht 5' 7\" (1.702 m)   Wt 191 lb 4.8 oz (86.8 kg)   LMP  (LMP Unknown)   SpO2 100%   BMI 29.96 kg/m²   Head: Normocephalic, atraumatic  Sclera clear  Neck JVD flat  Lungs: normal effort of breathing    Labs:   Recent Labs     02/15/23  1130   WBC 9.8   HGB 14.2   HCT 42.5   PLT 160       Recent Labs     02/15/23  1130 02/17/23  0836   * 127*   K 4.1 4.4   CL 94* 92*   CO2 24 23   BUN 23 45*   CREATININE 3.76* 4.79*   CALCIUM 9.7 8.6   AST 29  --    ALT 25  --    BILITOT 0.5  --    ALKPHOS 208*  --        Recent Labs     02/15/23  1130   INR 1.1       Recent Labs     02/15/23  1130 02/15/23  1700 02/16/23  0526 02/16/23  0837 02/16/23 2014   CKTOTAL 46  --   --   --   --    TROPONINI 0.043*   < > 0.031* <0.010 0.037*    < > = values in this interval not displayed. Radiology:  XR CHEST PORTABLE   Final Result   Mild CHF. Assessment/Plan:    Chest pain with angina symptoms, with exertion.    S/p cath 2/16  Cad and cabg    Esrd: HD in coord with nephrology    HTN: monitor BP, adjust meds as needed  DM: monitor glucose accordion, titrate meds as needed    35 minutes total care time, >1/2 in unit/floor time and care coordination     Dc plan: ok to dc tomorrow after HD    Judith Lewis MD ,MD

## 2023-02-18 NOTE — PLAN OF CARE
Problem: Discharge Planning  Goal: Discharge to home or other facility with appropriate resources  Outcome: Progressing  Flowsheets (Taken 2/16/2023 0444 by Zhou Macedo RN)  Discharge to home or other facility with appropriate resources: Identify barriers to discharge with patient and caregiver     Problem: Pain  Goal: Verbalizes/displays adequate comfort level or baseline comfort level  Outcome: Progressing  Flowsheets (Taken 2/18/2023 0242)  Verbalizes/displays adequate comfort level or baseline comfort level:   Encourage patient to monitor pain and request assistance   Assess pain using appropriate pain scale   Administer analgesics based on type and severity of pain and evaluate response   Implement non-pharmacological measures as appropriate and evaluate response   Notify Licensed Independent Practitioner if interventions unsuccessful or patient reports new pain     Problem: Safety - Adult  Goal: Free from fall injury  Outcome: Progressing  Flowsheets (Taken 2/18/2023 0242)  Free From Fall Injury: Instruct family/caregiver on patient safety     Problem: Chronic Conditions and Co-morbidities  Goal: Patient's chronic conditions and co-morbidity symptoms are monitored and maintained or improved  Outcome: Progressing  Flowsheets (Taken 2/18/2023 0242)  Care Plan - Patient's Chronic Conditions and Co-Morbidity Symptoms are Monitored and Maintained or Improved:   Monitor and assess patient's chronic conditions and comorbid symptoms for stability, deterioration, or improvement   Collaborate with multidisciplinary team to address chronic and comorbid conditions and prevent exacerbation or deterioration  Note: Monitor labs and vitals.  Midodrine started today

## 2023-02-18 NOTE — FLOWSHEET NOTE
0800  AM assessment complete. VSS. Pt has c/o \"6/10\" chest pain, medicated with 650mg PO Tylenol. No c/o SOB, resp even and unlabord, no distress noted. Lungs diminished throughout. No peripheral edema noted. Pulses palpable. Pt eating breakfast and AM medications given without difficulty. Bed alarm for safety. 1100  Pt resting quietly in bed with eyes closed. No distress. Bed alarm for safety. 1420  Pt to HD via bed. 1800  Pt returned from HD. Vitals obtained and stable. Pt is aware of discharge home tonight. Blood glucose 69. Pt to eat dinner before her discharge home this PM.      1910  Tele and saline lock d/c'd as pt being discharged home.

## 2023-02-18 NOTE — PROGRESS NOTES
Chief Complaint   Patient presents with    Chest Pain     2 days    Shortness of Breath     Since yesterday        Patient is a 70 y.o. female who presents with a chief complaint of chest pain as well as shortness of breath. Patient is followed on a regular basis by Dr. Molinda Goldmann, MD. patient with past medical history of coronary disease status post PCI, CABG in January 2022 with LIND to LAD, tricuspid valve repair complicated by tamponade and pericardial window, moderate carotid artery disease 50 to 69% bilateral ICA stenosis, diabetes mellitus, hypertension, hyperlipidemia, chronic congestive heart failure, chronic kidney disease, multimedical problems. Patient complains of typical anginal symptoms, midsternal chest pressure and heaviness at rest, feels similar to prior having CABG/PCI. She also complains of shortness of breath at rest and worse with minimal exertion  Patient took sublingual nitroglycerin without significant relief. Has mildly elevated cardiac enzyme and BNP of 16,900. Patient with chronic disease stage V with a creatinine of 3.76. Chest x-ray shows mild congestive heart failure  EKG with normal sinus rhythm, right bundle branch block.     2/18/23 doing well for HD today no cp no sob walks w walker      Past Medical History:   Diagnosis Date    Angina at rest [facility]) 5/24/2020    Anxiety     CAD S/P percutaneous coronary angioplasty 2015, 2018    stents per dr Sonia Orozco    CHF (congestive heart failure) ([address]     CKD (chronic kidney disease) stage 4, GFR 15-29 ml/min ([address] 2/24/2018    CKD stage 4 due to type 2 diabetes mellitus ([address]     Contusion of right chest wall 2/16/2021    COPD (chronic obstructive pulmonary disease) ([address]     Diabetic nephropathy with proteinuria ([address] 2014    DJD (degenerative joint disease) of knee     Dr Arslan Almonte    GERD (gastroesophageal reflux disease)     Hemiparesis, left ([address] 2013    entered Assisted Living ([facility])    Hemodialysis patient [facility])     Hemodialysis-associated hypotension 10/22/2021    History of heart failure     History of seizures     History of type C viral hepatitis     HTN (hypertension)     Hyperlipidemia     Impaired mobility and activities of daily living     Infection of venous access port 7/29/2022    Mediastinal lymphadenopathy 2013    Dr Tessa Cervantes    Metabolic syndrome     Moderate persistent asthma without complication 4/56/2051    Need for extended care facility 7/7/2021    Neurogenic urinary incontinence 2013    Neuropathy in diabetes [facility])     Nonrheumatic mitral valve regurgitation 7/7/2021    Nonrheumatic tricuspid valve regurgitation 7/7/2021    Obesity (BMI 30-39. 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 ([address]     Type 2 diabetes mellitus with renal manifestations, controlled ([address] 2015    Insulin dependent, Dr Payton Bryant    Uncontrolled type 2 diabetes mellitus with hyperglycemia ([address]     Urinary incontinence due to cognitive impairment 2013    Vitamin D deficiency 2014      Patient Active Problem List   Diagnosis    Atherosclerotic heart disease of native coronary artery with unspecified angina pectoris (HCC)    Schizophrenia, paranoid, chronic    Metabolic syndrome    Vitamin B 12 deficiency    Cerebral microvascular disease    Mixed hyperlipidemia    Other hammer toe (acquired)    Vitamin D insufficiency    Incontinence of urine    Diabetic nephropathy with proteinuria ([address]    Essential (primary) hypertension    History of type C viral hepatitis    Urinary incontinence due to cognitive impairment    History of seizures    Stented coronary artery-plan is to stay on Plavix indefinately per Dr Ara Wills Inadequately controlled diabetes mellitus ([facility]    Controlled type 2 diabetes mellitus with diabetic neuropathy, with long-term current use of insulin ([facility])    Hemiparesis, left ([facility])    Angina, class II ([facility])    Pain, unspecified    Tardive dyskinesia    Shortness of breath    Uncontrolled type 2 diabetes mellitus with hyperglycemia ([facility])    Chronic diastolic congestive heart failure ([facility])    Sleep apnea, unspecified    Pulmonary hypertension, unspecified ([facility])    Class 2 severe obesity with serious comorbidity and body mass index (BMI) of 36.0 to 36.9 in adult [facility])    Edema    Closed supracondylar fracture of right humerus    Other chronic pain    Palliative care patient    Recurrent falls    Renal failure    Difficulty in walking    ESRD (end stage renal disease) on dialysis ([facility])    Weakness    Other seizures ([facility])    Moderate persistent asthma without complication    [id number]    Post PTCA    Falls frequently    Chest wall contusion, left, initial encounter    Headache, unspecified    Paranoid schizophrenia ([facility])    Compression fracture of spine ([facility]    Closed rib fracture    Depression    Chronic obstructive pulmonary disease ([facility])    Critical illness polyneuropathy ([facility]    Multiple closed fractures of ribs of right side    Nonrheumatic mitral valve regurgitation    Nonrheumatic tricuspid valve regurgitation    Need for extended care facility    Chronic pain of both shoulders    Hemodialysis-associated hypotension    Anginal chest pain at rest [facility])    Chest pain    Unstable angina ([facility])    NSTEMI (non-ST elevated myocardial infarction) ([facility])    CKD (chronic kidney disease) stage 4, GFR 15-29 ml/min ([facility])    Hyperkalemia    Impaired mobility and activities of daily living dt polyneuropathy and reccent fall     Dialysis patient [facility])    Unspecified open wound of right upper arm, initial encounter    Multiple closed fractures of right lower extremity and ribs    Closed T11 fracture ([facility])    Encephalopathy acute    MRSA bacteremia    Sepsis due to Enterococcus [facility])    Local infection due to central venous catheter    DJD (degenerative joint disease), cervical    Closed head injury    Sarcopenia    Fall from standing    Septicemia ([facility]    Chronic hepatitis C ([facility]    Catheter-related bloodstream infection    Enterococcus faecalis infection    Cervical stenosis of spinal canal    Ataxic gait    Lumbar stenosis with neurogenic claudication    Falls infrequently    Infection of venous access port       Past Surgical History:   Procedure Laterality Date     SECTION      x1    COLONOSCOPY  2014    Dr. Sadie Cabrera      x1 Dr. Kellie Monique, Dr Diana Stark  08/10/2021    [facility]    DIAGNOSTIC CARDIAC CATH LAB PROCEDURE  10/02/2019    DIALYSIS CATHETER INSERTION Left 2022    Tunneled Symetrex 15.5F x 23cm hemodialysis catheter inserted by Dr. Osiris Saunders Left 2022    [id number] replamcent tunneld HD cath by Dr. Hilary Fernandez, TOTAL ABDOMINAL (CERVIX REMOVED)      one ovary intact, Dr Lashell Moody, menorrhagia    IR THROMBECTOMY PERCUT AVF  2022    IR THROMBECTOMY PERCUT AVF 2022 [facility] SPECIAL PROCEDURE    IR TUNNELED CATHETER PLACEMENT GREATER THAN 5 YEARS  2022    IR TUNNELED CATHETER PLACEMENT GREATER THAN 5 YEARS 6/3/2022 [facility] PROCEDURE    IR TUNNELED CATHETER PLACEMENT GREATER THAN 5 YEARS Left 2022 Dr. Celine Adame exchanged to 15.5F x 28 cm.      15.5 fr by 23 cm Symetrex dialysis catheter inserted by Dr Celine Adame    IR TUNNELED [address] 5 YEARS  2022    IR TUNNELED CATHETER PLACEMENT GREATER THAN 5 YEARS 2022 [facility] SPECIAL PROCEDURE    AL ARTHRP KNE CONDYLE&PLATU MEDIAL&LAT COMPARTMENTS Left 2018    LEFT KNEE TOTAL KNEE ARTHROPLASTY, SHAYNA, NERVE BLOCK performed by Laura Oneill MD at [facility]      TOE AMPUTATION Right     TOTAL KNEE ARTHROPLASTY  05/19/2016    Dr Lee Done    TUNNELED VENOUS CATHETER PLACEMENT Right 07/01/2020    tunneled HD catheter per Dr Carol Lawrence History    Marital status:      Spouse name: None    Number of children: 2    Years of education: None    Highest education level: None   Occupational History    Occupation: disabled   Tobacco Use    Smoking status: Never    Smokeless tobacco: Never   Vaping Use    Vaping Use: Never used   Substance and Sexual Activity    Alcohol use: No     Alcohol/week: 0.0 standard drinks    Drug use: No    Sexual activity: Not Currently   Social History Narrative    Disabled dt depression and low back pain, lives [place] [facility] [place] is close by and is her paid caregiver via waiver services    HD via [employer], [id number], PET[employer]VESI, Sat. Son is in the home and has CP and is total care-and is also cared for by a waiver by [facility]. 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 Per daughter, her brother is blind, MRDD, CP multiple health issues. Daughter's  is patient's legal guardian. Patient has hemodialysis Tuesday Thursday and Saturday. Patient's bedroom is on main floor with a half bath. Daughter walks patient upstairs once weekly for full bath.   Patient is using her walker in the home. Patient has a hospital bed in the home. Social Determinants of Health     Financial Resource Strain: Low Risk     Difficulty of Paying Living Expenses: Not hard at all   Food Insecurity: No Food Insecurity    Worried About [address] in the Last Year: Never true    [address] [facility] [address] in the Last Year: Never true   Transportation Needs: No Transportation Needs    Lack of Transportation (Medical): No    Lack of Transportation (Non-Medical):  No   Physical Activity: Sufficiently Active    Days of Exercise per Week: 3 days    Minutes of Exercise per Session: 60 min       Family History   Problem Relation Age of Onset    Cancer Mother 76        survived    Breast Cancer Mother     Hypertension Father     Diabetes Sister     Mental Illness Sister     Colon Cancer Neg Hx        Current Facility-Administered Medications   Medication Dose Route Frequency Provider Last Rate Last Admin    insulin glargine (LANTUS) injection vial 20 Units  20 Units SubCUTAneous BID Иван Bosch MD   20 Units at 02/18/23 0759    insulin lispro (HUMALOG) injection vial 10 Units  10 Units SubCUTAneous TID WC Andrei Ambriz MD   10 Units at 02/18/23 0759    midodrine (PROAMATINE) tablet 5 mg  5 mg Oral Once per day on Mon Wed Fri LORETO Rayo CNP   5 mg at 02/17/23 2145    atorvastatin (LIPITOR) tablet 40 mg  40 mg Oral Nightly LORETO Powell CNP   40 mg at 02/17/23 2145    b complex-C-folic acid (NEPHROCAPS) capsule 1 mg  1 capsule Oral Daily LORETO Powell CNP   1 mg at 02/18/23 0754    melatonin disintegrating tablet 10 mg  10 mg Oral Nightly PRN LORETO Rayo CNP   10 mg at 02/17/23 2249    pantoprazole (PROTONIX) tablet 20 mg  20 mg Oral Daily LORETO Powell CNP   20 mg at 02/18/23 0754    sertraline (ZOLOFT) tablet 50 mg  50 mg Oral Daily LORETO Powell CNP   50 mg at 02/18/23 0754    insulin lispro (HUMALOG) injection vial 0-8 Units  0-8 Units SubCUTAneous TID  Lissa Carrasquillo MD   8 Units at 02/17/23 1220    insulin lispro (HUMALOG) injection vial 0-4 Units  0-4 Units SubCUTAneous Nightly Lissa Carrasquillo MD   4 Units at 02/16/23 2120    glucose chewable tablet 16 g  4 tablet Oral PRN Lissa Carrasquillo MD        dextrose bolus 10% 125 mL  125 mL IntraVENous PRN Lissa Carrasquillo MD        Or    dextrose bolus 10% 250 mL  250 mL IntraVENous PRN Lissa Carrasquillo MD        glucagon (rDNA) injection 1 mg  1 mg SubCUTAneous PRN Lissa Carrasquillo MD        dextrose 10 % infusion   IntraVENous Continuous PRN Lissa Carrasquillo MD        heparin (porcine) injection 5,000 Units  5,000 Units SubCUTAneous 3 times per day Chase Arias MD   5,000 Units at 02/18/23 0535    heparin (porcine) injection 4,000 Units  4,000 Units IntraVENous PRN Tiarra Ware DO   4,000 Units at 02/16/23 1216    sodium chloride flush 0.9 % injection 5-40 mL  5-40 mL IntraVENous 2 times per day Malcom Collins MD   10 mL at 02/18/23 0801    sodium chloride flush 0.9 % injection 5-40 mL  5-40 mL IntraVENous PRN Malcom Collins MD        0.9 % sodium chloride infusion   IntraVENous PRN Malcom Collins MD        acetaminophen (TYLENOL) tablet 650 mg  650 mg Oral Q4H PRN Malcom Collins MD   650 mg at 02/16/23 1812    ondansetron (ZOFRAN) injection 4 mg  4 mg IntraVENous Q6H PRN Malcom Collins MD        hydrALAZINE (APRESOLINE) injection 10 mg  10 mg IntraVENous Q10 Min PRN Malcom Collins MD        labetalol (NORMODYNE;TRANDATE) injection 10 mg  10 mg IntraVENous Q30 Min PRN Malcom Collins MD        sodium chloride flush 0.9 % injection 5-40 mL  5-40 mL IntraVENous 2 times per day Chase Arias MD   10 mL at 02/17/23 0836    sodium chloride flush 0.9 % injection 5-40 mL  5-40 mL IntraVENous PRN Chase Arias MD        0.9 % sodium chloride infusion   IntraVENous PRN Chase Arias MD        ondansetron (ZOFRAN-ODT) disintegrating tablet 4 mg  4 mg Oral Q8H PRN Chase Arias MD        Or    [facility] injection 4 mg  4 mg IntraVENous Q6H PRN Marcial Godwin MD        polyethylene glycol (GLYCOLAX) packet 17 g  17 g Oral Daily PRN Marcial Godwin MD   17 g at 02/17/23 1220    acetaminophen (TYLENOL) tablet 650 mg  650 mg Oral Q6H PRN Marcial Godwin MD   650 mg at 02/18/23 0753    Or    acetaminophen (TYLENOL) suppository 650 mg  650 mg Rectal Q6H PRN Marcial Godwin MD        nitroGLYCERIN (NITROSTAT) SL tablet 0.4 mg  0.4 mg SubLINGual Q5 Min PRN [facility] J Holiday, DO   0.4 mg at 02/16/23 0116    aspirin EC tablet 81 mg  81 mg Oral Daily Virgilio J Holiday, DO   81 mg at 02/18/23 0754    metoprolol tartrate (LOPRESSOR) tablet 25 mg  25 mg Oral BID Virgilio J Holiday, DO   25 mg at 02/18/23 0753    ranolazine (RANEXA) extended release tablet 500 mg  500 mg Oral BID Virgilio J Holiday, DO   500 mg at 02/18/23 0754       ALLERGIES: Codeine and Oxycontin [oxycodone hcl]    Review of Systems   Constitutional: Negative. Negative for chills and fever. HENT: Negative. Eyes: Negative. Respiratory:  Positive for shortness of breath. Negative for wheezing. Cardiovascular:  Positive for chest pain. Negative for palpitations and leg swelling. Gastrointestinal: Negative. Negative for abdominal pain, nausea and vomiting. Endocrine: Negative. Genitourinary: Negative. Musculoskeletal: Negative. Skin: Negative. Negative for rash. Allergic/Immunologic: Negative. Neurological: Negative. Negative for dizziness, weakness and headaches. Hematological: Negative. Psychiatric/Behavioral: Negative. VITALS:  Blood pressure (!) 113/46, pulse 60, temperature 97.2 °F (36.2 °C), temperature source Oral, resp. rate 18, height 5' 7\" (1.702 m), weight 197 lb (89.4 kg), SpO2 100 %, not currently breastfeeding. Body mass index is 30.85 kg/m². Physical Exam  Vitals and nursing note reviewed. Constitutional:       Appearance: She is well-developed. She is not diaphoretic. HENT:      Head: Normocephalic and atraumatic. Eyes:      Pupils: Pupils are equal, round, and reactive to light. Neck:      Thyroid: No thyromegaly. Vascular: No JVD. Trachea: No tracheal deviation. Cardiovascular:      Rate and Rhythm: Normal rate and regular rhythm. Chest Wall: PMI is not displaced. Pulses: Intact distal pulses. Heart sounds: Normal heart sounds. Heart sounds not distant. No murmur heard. No friction rub. No gallop. No S3 sounds. Pulmonary:      Effort: No respiratory distress. Breath sounds: No wheezing or rales. Chest:      Chest wall: No tenderness. Abdominal:      General: Bowel sounds are normal. There is no distension. Palpations: Abdomen is soft. There is no mass. Tenderness: There is no abdominal tenderness. There is no guarding or rebound. Musculoskeletal:         General: Normal range of motion. Cervical back: Normal range of motion and neck supple. Skin:     General: Skin is warm and dry. Coloration: Skin is not pale. Findings: No erythema or rash. Neurological:      Mental Status: She is alert and oriented to person, place, and time. Cranial Nerves: No cranial nerve deficit. Psychiatric:         Behavior: Behavior normal.         Thought Content:  Thought content normal.         Judgment: Judgment normal.       LABS:  Recent Results (from the past 24 hour(s))   Basic Metabolic Panel    Collection Time: 02/17/23  8:36 AM   Result Value Ref Range    Sodium 127 (L) 135 - 144 mEq/L    Potassium 4.4 3.4 - 4.9 mEq/L    Chloride 92 (L) 95 - 107 mEq/L    CO2 23 20 - 31 mEq/L    Anion Gap 12 9 - 15 mEq/L    Glucose 384 (H) 70 - 99 mg/dL    BUN 45 (H) 8 - 23 mg/dL    Creatinine 4.79 (H) 0.50 - 0.90 mg/dL    Est, Glom Filt Rate 9.5 (L) >60    Calcium 8.6 8.5 - 9.9 mg/dL   POCT Glucose    Collection Time: 02/17/23 11:31 AM   Result Value Ref Range    POC Glucose 369 (H) 70 - 99 mg/dl    Performed on ACCU-CHEK    POCT Glucose    Collection Time: 02/17/23  3:13 PM Result Value Ref Range    POC Glucose 135 (H) 70 - 99 mg/dl    Performed on ACCU-CHEK    POCT Glucose    Collection Time: 02/17/23  4:33 PM   Result Value Ref Range    POC Glucose 74 70 - 99 mg/dl    Performed on ACCU-CHEK    POCT Glucose    Collection Time: 02/17/23  5:19 PM   Result Value Ref Range    POC Glucose 137 (H) 70 - 99 mg/dl    Performed on ACCU-CHEK    POCT Glucose    Collection Time: 02/17/23  8:10 PM   Result Value Ref Range    POC Glucose 157 (H) 70 - 99 mg/dl    Performed on ACCU-CHEK    Basic Metabolic Panel    Collection Time: 02/18/23  5:06 AM   Result Value Ref Range    Sodium 132 (L) 135 - 144 mEq/L    Potassium 4.9 3.4 - 4.9 mEq/L    Chloride 95 95 - 107 mEq/L    CO2 23 20 - 31 mEq/L    Anion Gap 14 9 - 15 mEq/L    Glucose 109 (H) 70 - 99 mg/dL    BUN 62 (H) 8 - 23 mg/dL    Creatinine 6.03 (HH) 0.50 - 0.90 mg/dL    Est, Glom Filt Rate 7.2 (L) >60    Calcium 8.6 8.5 - 9.9 mg/dL   POCT Glucose    Collection Time: 02/18/23  6:44 AM   Result Value Ref Range    POC Glucose 120 (H) 70 - 99 mg/dl    Performed on ACCU-CHEK      Troponin:   Lab Results   Component Value Date/Time    TROPONINI 0.037 02/16/2023 08:14 PM       EKG: normal sinus rhythm, RBBB      ASSESSMENT:    Angina at rest, angina class IV  Elevated cardiac enzyme-rule out cardiac ischemia versus demand ischemia end-stage renal disease hemodialysis dependent  Shortness of breath secondary to acute on chronic diastolic congestive heart failure  History of CAD status post PCI/CABG - LIMA Graft patent. History of pericardial effusion/tamponade status post window  End-stage renal disease hemodialysis dependent  Diabetes mellitus  Hypertension  Hyperlipidemia  Multiple medical problems    PLAN:   As always, aggressive risk factor modification is strongly recommended. We should adhere to the JNC VIII guidelines for HTN management and the NCEPATP III guidelines for LDL-C management.   Stable CAD  2D echocardiogram - EF 55%  Maximize cardiac medications  Monitor on telemetry  GI/DVT prophylaxis  Low-sodium diet  CHF teaching  OK for dc after HD - f/u w me 3 weeks. Thank you for allowing me to participate in the care of your patient, please don't hesitate to contact me if you have any further questions.       Electronically signed by Valeria Jackson MD on 2/18/2023 at 8:17 AM

## 2023-02-19 LAB
EKG ATRIAL RATE: 69 BPM
EKG P AXIS: 61 DEGREES
EKG P-R INTERVAL: 226 MS
EKG Q-T INTERVAL: 490 MS
EKG QRS DURATION: 126 MS
EKG QTC CALCULATION (BAZETT): 525 MS
EKG R AXIS: -58 DEGREES
EKG T AXIS: 41 DEGREES
EKG VENTRICULAR RATE: 69 BPM

## 2023-02-23 ENCOUNTER — APPOINTMENT (OUTPATIENT)
Dept: GENERAL RADIOLOGY | Age: 63
End: 2023-02-23
Payer: MEDICAID

## 2023-02-23 ENCOUNTER — HOSPITAL ENCOUNTER (EMERGENCY)
Age: 63
Discharge: HOME | End: 2023-02-23
Attending: STUDENT IN AN ORGANIZED HEALTH CARE EDUCATION/TRAINING PROGRAM
Payer: MEDICAID

## 2023-02-23 VITALS
HEIGHT: 67 IN | RESPIRATION RATE: 18 BRPM | DIASTOLIC BLOOD PRESSURE: 61 MMHG | SYSTOLIC BLOOD PRESSURE: 112 MMHG | OXYGEN SATURATION: 97 % | BODY MASS INDEX: 30.13 KG/M2 | HEART RATE: 72 BPM | WEIGHT: 192 LBS | TEMPERATURE: 97.9 F

## 2023-02-23 DIAGNOSIS — R07.9 CHEST PAIN, UNSPECIFIED TYPE: Primary | ICD-10-CM

## 2023-02-23 DIAGNOSIS — I95.9 TRANSIENT HYPOTENSION: ICD-10-CM

## 2023-02-23 LAB
ALBUMIN SERPL-MCNC: 3.7 G/DL (ref 3.5–4.6)
ALP BLD-CCNC: 177 U/L (ref 40–130)
ALT SERPL-CCNC: 25 U/L (ref 0–33)
ANION GAP SERPL CALCULATED.3IONS-SCNC: 11 MEQ/L (ref 9–15)
APTT: 102 SEC (ref 24.4–36.8)
AST SERPL-CCNC: 27 U/L (ref 0–35)
BASOPHILS ABSOLUTE: 0.1 K/UL (ref 0–0.2)
BASOPHILS RELATIVE PERCENT: 0.5 %
BILIRUB SERPL-MCNC: 0.4 MG/DL (ref 0.2–0.7)
BUN BLDV-MCNC: 26 MG/DL (ref 8–23)
CALCIUM SERPL-MCNC: 9.4 MG/DL (ref 8.5–9.9)
CHLORIDE BLD-SCNC: 96 MEQ/L (ref 95–107)
CO2: 25 MEQ/L (ref 20–31)
CREAT SERPL-MCNC: 3.54 MG/DL (ref 0.5–0.9)
EOSINOPHILS ABSOLUTE: 0.5 K/UL (ref 0–0.7)
EOSINOPHILS RELATIVE PERCENT: 4.9 %
GFR SERPL CREATININE-BSD FRML MDRD: 13.7 ML/MIN/{1.73_M2}
GLOBULIN: 3.4 G/DL (ref 2.3–3.5)
GLUCOSE BLD-MCNC: 371 MG/DL (ref 70–99)
HCT VFR BLD CALC: 35.8 % (ref 37–47)
HEMOGLOBIN: 12.2 G/DL (ref 12–16)
INR BLD: 1.1
LYMPHOCYTES ABSOLUTE: 1.6 K/UL (ref 1–4.8)
LYMPHOCYTES RELATIVE PERCENT: 15.1 %
MAGNESIUM: 1.8 MG/DL (ref 1.7–2.4)
MCH RBC QN AUTO: 30.3 PG (ref 27–31.3)
MCHC RBC AUTO-ENTMCNC: 34 % (ref 33–37)
MCV RBC AUTO: 89.2 FL (ref 79.4–94.8)
MONOCYTES ABSOLUTE: 0.6 K/UL (ref 0.2–0.8)
MONOCYTES RELATIVE PERCENT: 5.5 %
NEUTROPHILS ABSOLUTE: 8 K/UL (ref 1.4–6.5)
NEUTROPHILS RELATIVE PERCENT: 74 %
PDW BLD-RTO: 16.6 % (ref 11.5–14.5)
PLATELET # BLD: 191 K/UL (ref 130–400)
POTASSIUM SERPL-SCNC: 4.1 MEQ/L (ref 3.4–4.9)
PRO-BNP: 7539 PG/ML
PROTHROMBIN TIME: 14.6 SEC (ref 12.3–14.9)
RBC # BLD: 4.01 M/UL (ref 4.2–5.4)
SODIUM BLD-SCNC: 132 MEQ/L (ref 135–144)
TOTAL CK: 33 U/L (ref 0–170)
TOTAL PROTEIN: 7.1 G/DL (ref 6.3–8)
TROPONIN: 0.04 NG/ML (ref 0–0.01)
TSH SERPL DL<=0.05 MIU/L-ACNC: 0.5 UIU/ML (ref 0.44–3.86)
WBC # BLD: 10.8 K/UL (ref 4.8–10.8)

## 2023-02-23 PROCEDURE — 71045 X-RAY EXAM CHEST 1 VIEW: CPT

## 2023-02-23 PROCEDURE — 36415 COLL VENOUS BLD VENIPUNCTURE: CPT

## 2023-02-23 PROCEDURE — 80053 COMPREHEN METABOLIC PANEL: CPT

## 2023-02-23 PROCEDURE — 82550 ASSAY OF CK (CPK): CPT

## 2023-02-23 PROCEDURE — 84443 ASSAY THYROID STIM HORMONE: CPT

## 2023-02-23 PROCEDURE — 83735 ASSAY OF MAGNESIUM: CPT

## 2023-02-23 PROCEDURE — 84484 ASSAY OF TROPONIN QUANT: CPT

## 2023-02-23 PROCEDURE — 83880 ASSAY OF NATRIURETIC PEPTIDE: CPT

## 2023-02-23 PROCEDURE — 93005 ELECTROCARDIOGRAM TRACING: CPT | Performed by: STUDENT IN AN ORGANIZED HEALTH CARE EDUCATION/TRAINING PROGRAM

## 2023-02-23 PROCEDURE — 85730 THROMBOPLASTIN TIME PARTIAL: CPT

## 2023-02-23 PROCEDURE — 85025 COMPLETE CBC W/AUTO DIFF WBC: CPT

## 2023-02-23 PROCEDURE — 99285 EMERGENCY DEPT VISIT HI MDM: CPT

## 2023-02-23 PROCEDURE — 6370000000 HC RX 637 (ALT 250 FOR IP): Performed by: STUDENT IN AN ORGANIZED HEALTH CARE EDUCATION/TRAINING PROGRAM

## 2023-02-23 PROCEDURE — 85610 PROTHROMBIN TIME: CPT

## 2023-02-23 RX ORDER — ASPIRIN 81 MG/1
162 TABLET, CHEWABLE ORAL ONCE
Status: COMPLETED | OUTPATIENT
Start: 2023-02-23 | End: 2023-02-23

## 2023-02-23 RX ADMIN — ASPIRIN 162 MG: 81 TABLET, CHEWABLE ORAL at 13:50

## 2023-02-23 ASSESSMENT — PAIN - FUNCTIONAL ASSESSMENT
PAIN_FUNCTIONAL_ASSESSMENT: 0-10

## 2023-02-23 ASSESSMENT — ENCOUNTER SYMPTOMS
CHEST TIGHTNESS: 0
COUGH: 0
ABDOMINAL PAIN: 0
DIARRHEA: 0
BLOOD IN STOOL: 0
BACK PAIN: 0
TROUBLE SWALLOWING: 0
ANAL BLEEDING: 0
SHORTNESS OF BREATH: 0
VOMITING: 0
SINUS PRESSURE: 0

## 2023-02-23 ASSESSMENT — PAIN DESCRIPTION - LOCATION
LOCATION: CHEST
LOCATION: CHEST

## 2023-02-23 ASSESSMENT — HEART SCORE: ECG: 1

## 2023-02-23 ASSESSMENT — PAIN SCALES - GENERAL
PAINLEVEL_OUTOF10: 6
PAINLEVEL_OUTOF10: 6
PAINLEVEL_OUTOF10: 3

## 2023-02-23 NOTE — ED NOTES
Patient to ED from dialysis with c/o hypotension and chest pain prior to treatment. Patient states last treatment on Tuesday and was full treatment. Patient states pain level 6/10 upon arrival to ED. Resting in bed with eyes closed at this time, bedside monitor in place, respirations easy and unlabored, will continue to monitor.       Belén Thomas RN  02/23/23 [id number]

## 2023-02-23 NOTE — DISCHARGE INSTRUCTIONS
Call your cardiologist tomorrow. Troponin is at his baseline. No pneumonia. Call your dialysis center tomorrow to see when they want you to repeat dialysis.

## 2023-02-23 NOTE — ED PROVIDER NOTES
[address] [facility] ED  eMERGENCY dEPARTMENT eNCOUnter      Pt Name: Mati Sierra  MRN: [id number]  [place] 1960  Date of evaluation: 2/23/2023  Provider: Gael Boone DO    CHIEF COMPLAINT       Chief Complaint   Patient presents with    Chest Pain     Patient sent from dialysis for chest pain and hypotension, did not receive treatment, last treatment on Tuesday    Hypotension         HISTORY OF PRESENT ILLNESS   (Location/Symptom, Timing/Onset,Context/Setting, Quality, Duration, Modifying Factors, Severity)  Note limiting factors. Mati Sierra is a 70 y.o. female who presents to the emergency department with chest pressure. It was during dialysis and her blood pressure is low. Upon arrival to the ER the patient's blood pressure is normal.  Patient denies any pain at the time the physician it was examining her. Patient denies any shortness of breath. Patient has had similar episodes in the past when her blood pressure has been low. Patient states she was recently admitted, they did a cath on her and they found no new blockage. She did not get any stents and she did not get any angioplasty. Chest    The history is provided by the patient. NursingNotes were reviewed. REVIEW OF SYSTEMS    (2-9 systems for level 4, 10 or more for level 5)     Review of Systems   Constitutional:  Negative for activity change, appetite change, chills, diaphoresis, fatigue, fever and unexpected weight change. HENT:  Negative for drooling, ear pain, nosebleeds, sinus pressure and trouble swallowing. Respiratory:  Negative for cough, chest tightness and shortness of breath. Cardiovascular:  Positive for chest pain. Negative for leg swelling. Gastrointestinal:  Negative for abdominal pain, anal bleeding, blood in stool, diarrhea and vomiting. Endocrine: Negative for polydipsia and polyphagia. Genitourinary:  Negative for dysuria, flank pain and frequency.    Musculoskeletal:  Negative for back pain and myalgias. Skin:  Negative for pallor and rash. Neurological:  Negative for syncope, weakness and headaches. Hematological:  Does not bruise/bleed easily. All other systems reviewed and are negative. Except as noted above the remainder of the review of systems was reviewed and negative. PAST MEDICAL HISTORY     Past Medical History:   Diagnosis Date    Angina at rest [facility]) 5/24/2020    Anxiety     CAD S/P percutaneous coronary angioplasty 2015, 2018    stents per dr Navid Cyr    CHF (congestive heart failure) ([facility] [address]     CKD (chronic kidney disease) stage 4, GFR 15-29 ml/min ([facility] [address] 2/24/2018    CKD stage 4 due to type 2 diabetes mellitus ([facility] [address]     Contusion of right chest wall 2/16/2021    COPD (chronic obstructive pulmonary disease) ([facility] [address]     Diabetic nephropathy with proteinuria ([facility] [address] 2014    DJD (degenerative joint disease) of knee     Dr Bakari Villasenor    GERD (gastroesophageal reflux disease)     Hemiparesis, left ([facility] [address] 2013    entered Assisted Living ([facility])    Hemodialysis patient [facility])     Hemodialysis-associated hypotension 10/22/2021    History of heart failure     History of seizures     History of type C viral hepatitis     HTN (hypertension)     Hyperlipidemia     Impaired mobility and activities of daily living     Infection of venous access port 7/29/2022    Mediastinal lymphadenopathy 2013    Dr Carlton Lozano    Metabolic syndrome     Moderate persistent asthma without complication 1/86/3939    Need for extended care facility 7/7/2021    Neurogenic urinary incontinence 2013    Neuropathy in diabetes ([facility] [address]     Nonrheumatic mitral valve regurgitation 7/7/2021    Nonrheumatic tricuspid valve regurgitation 7/7/2021    Obesity (BMI 30-39. 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 Deborah Dumont      x1 Dr. Marilynn Hooks, Dr Hafsa Campbell  08/10/2021    [facility]    DIAGNOSTIC CARDIAC CATH LAB PROCEDURE  10/02/2019    DIALYSIS CATHETER INSERTION Left 2022    Tunneled Symetrex 15.5F x 23cm hemodialysis catheter inserted by Dr. Harry Bui Left 2022    [id number] replamcent tunneld HD cath by Dr. Jamie Kessler, TOTAL ABDOMINAL (CERVIX REMOVED)      one ovary intact, Dr Danii Patel, menorrhagia    IR THROMBECTOMY PERCUT AVF  2022    IR THROMBECTOMY PERCUT AVF 2022 [facility] PROCEDURE    IR TUNNELED CATHETER PLACEMENT GREATER THAN 5 YEARS  2022    IR TUNNELED CATHETER PLACEMENT GREATER THAN 5 YEARS 6/3/2022 [facility] PROCEDURE    IR TUNNELED CATHETER PLACEMENT GREATER THAN 5 YEARS Left 2022 Dr. Juan Antonio Cortez exchanged to 15.5F x 28 cm.      15.5 fr by 23 cm Symetrex dialysis catheter inserted by Dr Juan Antonio Cortez    IR TUNNELED [address] 5 YEARS  2022    IR TUNNELED CATHETER PLACEMENT GREATER THAN 5 YEARS 2022 [facility] PROCEDURE    AK ARTHRP KNE CONDYLE&PLATU MEDIAL&LAT COMPARTMENTS Left 2018    LEFT KNEE TOTAL KNEE ARTHROPLASTY, SHAYNA, NERVE BLOCK performed by Ricarda Alcocer MD at [address] [place], [place] [address]Third Floor Right     TOTAL KNEE ARTHROPLASTY  2016     [facility]    TUNNELED VENOUS CATHETER PLACEMENT Right 07/01/2020    tunneled HD catheter per Dr Aline Delgado       Previous Medications    ACETAMINOPHEN (TYLENOL) 325 MG TABLET    Take 2 tablets by mouth every 4 hours as needed for Pain (For mild to moderate pain (Pain 1-6 out of 10 on pain scale))    ALBUTEROL (PROVENTIL) 2.5 MG/0.5ML NEBU NEBULIZER SOLUTION    Take 2.5 mg by nebulization every 4 hours as needed for Wheezing or Shortness of Breath    ALCOHOL SWABS (EASY TOUCH ALCOHOL PREP MEDIUM) 70 % PADS    USE AS DIRECTED THREE TIMES A DAY    ARIPIPRAZOLE (ABILIFY) 5 MG TABLET    TAKE 1 TABLET BY MOUTH ONCE DAILY    ASPIRIN EC 81 MG EC TABLET    Take 1 tablet by mouth daily    ATORVASTATIN (LIPITOR) 40 MG TABLET    Take 1 tablet by mouth nightly    B COMPLEX-C-FOLIC ACID (NEPHROCAPS) 1 MG CAPSULE    Take 1 capsule by mouth daily Indications: Dialysis Dependent Chronic Kidney Failure    BLOOD GLUCOSE MONITORING SUPPL (FREESTYLE LITE) CORETTA    1 Device by Does not apply route daily as needed (Diabetes) Use freestyle meter to test blood sugar as needed    BLOOD GLUCOSE MONITORING SUPPL (ONETOUCH VERIO) W/DEVICE KIT    AS DIRECTED    BLOOD GLUCOSE TEST STRIPS (FREESTYLE LITE) STRIP    1 each by Does not apply route 4 times daily (before meals and nightly) As needed. BLOOD GLUCOSE TEST STRIPS (ONETOUCH VERIO) STRIP    QID    FREESTYLE LANCETS MISC    Test 4x daily    HANDICAP PLACARD MISC    by Does not apply route Expiration in 5 years. HYDROXYZINE HCL (ATARAX) 25 MG TABLET    Take 25 mg by mouth every 8 hours as needed for Itching    INSULIN LISPRO (HUMALOG) 100 UNIT/ML INJECTION VIAL    Inject 12 Units into the skin 3 times daily (before meals) Indications: Type 2 Diabetes    INSULIN LISPRO (HUMALOG) 100 UNIT/ML INJECTION VIAL    Inject 0-10 Units into the skin 3 times daily (before meals) Indications: Type 2 Diabetes Inject as per sliding scale:  If 151-200=2u; 201-250-4u; 251-300=6u; 301-350=8u; 351-400=10u. Call MD/NP if >400.    INSULIN LISPRO (HUMALOG) 100 UNIT/ML INJECTION VIAL    Inject 0-4 Units into the skin nightly Indications: Type 2 Diabetes Inject as per sliding scale: If 201-250=1u; 251-300=2u; 301-350=3u; 351-400=4u.  Call MD/NP if >400.    LACTOBACILLUS PO    Take 2 capsules by mouth 2 times daily Indications: Nutritional Support    LANTUS SOLOSTAR 100 UNIT/ML INJECTION PEN    INJECT 55 UNITS SUBCUTANEOUSLY AT BEDTIME    LOPERAMIDE (IMODIUM A-D) 2 MG TABLET    Take 2 mg by mouth every 6 hours as needed for Diarrhea Indications: Diarrhea    MELATONIN 3 MG TABS TABLET    Take 10 mg by mouth nightly as needed    METOPROLOL TARTRATE (LOPRESSOR) 25 MG TABLET    Take 0.5 tablets by mouth 2 times daily HOLD for SBP<100 or HR<60    MIDODRINE (PROAMATINE) 5 MG TABLET    Take 1 tablet by mouth one time a day every Tue, Thu, Sat for hypotension. Give 30 mins prior to dialysis.    NITROGLYCERIN (NITROSTAT) 0.4 MG SL TABLET    up to max of 3 total doses. If no relief after 1 dose, call 911.    ONETOUCH DELICA LANCETS 33G MISC    QID    PANTOPRAZOLE (PROTONIX) 20 MG TABLET    Take 20 mg by mouth daily Indications: Gastroesophageal Reflux Disease    SERTRALINE (ZOLOFT) 50 MG TABLET    Take 1 tablet by mouth nightly    SURE COMFORT PEN NEEDLES 30G X 8 MM MISC    USE AS DIRECTED FIVE TIMES A DAILY    TRAMADOL (ULTRAM) 50 MG TABLET    Take 50 mg by mouth every 6 hours as needed for Pain.       ALLERGIES     Codeine and Oxycontin [oxycodone hcl]    FAMILY HISTORY       Family History   Problem Relation Age of Onset    Cancer Mother 68        survived    Breast Cancer Mother     Hypertension Father     Diabetes Sister     Mental Illness Sister     Colon Cancer Neg Hx           SOCIAL HISTORY       Social History     Socioeconomic History    Marital status:      Spouse name: None    Number of children: 2    Years of education: None    Highest education level: None   Occupational History     Occupation: disabled   Tobacco Use    Smoking status: Never    Smokeless tobacco: Never   Vaping Use    Vaping Use: Never used   Substance and Sexual Activity    Alcohol use: No     Alcohol/week: 0.0 standard drinks    Drug use: No    Sexual activity: Not Currently   Social History Narrative    Disabled dt depression and low back pain, lives in [facility] Nidhi Patel is close by and is her paid caregiver via waiver services    HD via [employer], [id number], PETÄJÄVESI, Sat. Son is in the home and has CP and is total care-and is also cared for by a waiver by Kle[employer] Reyes. Pt was born in [place], one of 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 Per daughter, her brother is blind, MRDD, CP multiple health issues. Daughter's  is patient's legal guardian. Patient has hemodialysis Tuesday Thursday and Saturday. Patient's bedroom is on main floor with a half bath. Daughter walks patient upstairs once weekly for full bath. Patient is using her walker in the home. Patient has a hospital bed in the home.      Social Determinants of Health     Financial Resource Strain: Low Risk     Difficulty of Paying Living Expenses: Not hard at all   Food Insecurity: No Food Insecurity    Worried About [address] in the Last Year: Never true    Ran Out of Food in the Last Year: Never true   Transportation Needs: No Transportation Needs    Lack of Transportation (Medical): No    Lack of Transportation (Non-Medical): No   Physical Activity: Sufficiently Active    Days of Exercise per Week: 3 days    Minutes of Exercise per Session: 60 min       SCREENINGS    [place] Coma Scale  Eye Opening: Spontaneous  Best Verbal Response: Oriented  Best Motor Response: Obeys commands  [place] Coma Scale Score: 15 @FLOW([id number])@      PHYSICAL EXAM    (up to 7 for level 4, 8 or more for level 5)     ED Triage Vitals   BP Temp Temp src Heart Rate Resp SpO2 Height Weight   02/23/23 1326 02/23/23 1327 -- 02/23/23 1326 02/23/23 1326 02/23/23 1326 02/23/23 1326 02/23/23 1326   (!) 107/57 97.9 °F (36.6 °C)  73 14 99 % 5' 7\" (1.702 m) 192 lb (87.1 kg)       Physical Exam  Vitals and nursing note reviewed. Constitutional:       General: She is awake. She is not in acute distress. Appearance: Normal appearance. She is well-developed. She is obese. She is not ill-appearing, toxic-appearing or diaphoretic. Comments: No photophobia. No phonophobia. HENT:      Head: Normocephalic and atraumatic. No Gee's sign. Right Ear: External ear normal.      Left Ear: External ear normal.      Nose: Nose normal. No congestion or rhinorrhea. Mouth/Throat:      Mouth: Mucous membranes are moist.      Pharynx: Oropharynx is clear. No oropharyngeal exudate or posterior oropharyngeal erythema. Eyes:      General: No scleral icterus. Right eye: No foreign body or discharge. Left eye: No discharge. Extraocular Movements: Extraocular movements intact. Conjunctiva/sclera: Conjunctivae normal.      Left eye: No exudate. Pupils: Pupils are equal, round, and reactive to light. Neck:      Vascular: No JVD. Trachea: No tracheal deviation. Comments: No meningismus. Cardiovascular:      Rate and Rhythm: Normal rate and regular rhythm. Pulses: Normal pulses. Heart sounds: Normal heart sounds.  Heart sounds not distant. No murmur heard. No friction rub. No gallop. Pulmonary:      Effort: Pulmonary effort is normal. No respiratory distress. Breath sounds: Normal breath sounds. No stridor. No wheezing, rhonchi or rales. Chest:      Chest wall: No tenderness. Abdominal:      General: Abdomen is flat. Bowel sounds are normal. There is no distension. Palpations: Abdomen is soft. There is no mass. Tenderness: There is no abdominal tenderness. There is no right CVA tenderness, left CVA tenderness, guarding or rebound. Hernia: No hernia is present. Musculoskeletal:         General: No swelling, tenderness, deformity or signs of injury. Normal range of motion. Cervical back: Normal range of motion and neck supple. No rigidity. Lymphadenopathy:      Head:      Right side of head: No submental adenopathy. Left side of head: No submental adenopathy. Skin:     General: Skin is warm and dry. Capillary Refill: Capillary refill takes less than 2 seconds. Coloration: Skin is not jaundiced or pale. Findings: No bruising, erythema, lesion or rash. Neurological:      General: No focal deficit present. Mental Status: She is alert and oriented to person, place, and time. Mental status is at baseline. Cranial Nerves: No cranial nerve deficit. Sensory: No sensory deficit. Motor: No weakness. Coordination: Coordination normal.      Deep Tendon Reflexes: Reflexes are normal and symmetric. Psychiatric:         Mood and Affect: Mood normal.         Behavior: Behavior normal. Behavior is cooperative. Thought Content: Thought content normal.         Judgment: Judgment normal.       DIAGNOSTIC RESULTS     EKG: All EKG's are interpreted by the Emergency Department Physician who either signs or Co-signsthis chart in the absence of a cardiologist.    EKG: Sinus rhythm at 72 bpm with a first-degree AV block. Right bundle branch block. Left axis. Biphasic T wave in aVL. QT interval is 458 ms. There are no PVCs. RADIOLOGY:   Non-plain filmimages such as CT, Ultrasound and MRI are read by the radiologist. Plain radiographic images are visualized and preliminarily interpreted by the emergency physician with the below findings:        Interpretation per the Radiologist below, if available at the time ofthis note:    XR CHEST PORTABLE   Final Result   No evidence of acute cardiopulmonary disease is seen.                ED BEDSIDE ULTRASOUND:   Performed by ED Physician - none    LABS:  Labs Reviewed   APTT - Abnormal; Notable for the following components:       Result Value    aPTT 102.0 (*)     All other components within normal limits   CBC WITH AUTO DIFFERENTIAL - Abnormal; Notable for the following components:    RBC 4.01 (*)     Hematocrit 35.8 (*)     RDW 16.6 (*)     Neutrophils Absolute 8.0 (*)     All other components within normal limits   COMPREHENSIVE METABOLIC PANEL - Abnormal; Notable for the following components:    Sodium 132 (*)     Glucose 371 (*)     BUN 26 (*)     Creatinine 3.54 (*)     Est, Glom Filt Rate 13.7 (*)     Alkaline Phosphatase 177 (*)     All other components within normal limits    Narrative:     Zak Mart tel. [phone],  TROP results called to and read back by Dr. Rasheeda Simmons, 02/23/2023 15:49, by Nila Olson   TROPONIN - Abnormal; Notable for the following components:    Troponin 0.043 (*)     All other components within normal limits    Narrative:     Zak Mart tel. [phone],  TROP results called to and read back by Dr. Rasheeda Simmons, 02/23/2023 15:49, by Nila Olson   CK    Narrative:     Zak Mart tel. [phone],  TROP results called to and read back by Dr. Rasheeda Simmons, 02/23/2023 15:49, by [address]    Narrative:     Zak Mart tel. [phone],  TROP results called to and read back by Dr. Rasheeda Simmons, 02/23/2023 15:49, by [address]    Narrative:     Zak Mart tel. [phone],  TROP results called to and read back by Dr. Jeffrey Dunham, 02/23/2023 15:49, by Maritza CLARK    Narrative:     Dictia Marking tel. [phone],  TROP results called to and read back by Dr. Jeffrey Dunham, 02/23/2023 15:49, by Sharifa Mathew       All other labs were within normal range or not returned as of this dictation. EMERGENCY DEPARTMENT COURSE and DIFFERENTIAL DIAGNOSIS/MDM:   Vitals:    Vitals:    02/23/23 1330 02/23/23 1500 02/23/23 1600 02/23/23 1701   BP: (!) 112/59 108/63 106/60    Pulse: 72 71 69    Resp: 12 15 11 13   Temp:       SpO2: 100% 95% 93% 96%   Weight:       Height:               MDM  Patient had transient hypotension at dialysis which she states that she has had before causing chest pain. Recent hospitalization with heart cath that showed no blockage according to the patient. The ER physician spoke with Dr. Leopoldo Marshall [facility] cardiology) stated that there is an LAD that had previously been blocked and it is essentially the same but there is good collateral circulation there is no new blockages. The patient be discharged and he is agreeable to that plan. Reexamination the patient is nontoxic and in no distress. Vital signs are normal.      CONSULTS:  None    PROCEDURES:  Unless otherwise noted below, none     Procedures    FINAL IMPRESSION      1. Chest pain, unspecified type    2.  Transient hypotension          DISPOSITION/PLAN   DISPOSITION Decision To Discharge 02/23/2023 04:43:18 PM      PATIENT REFERRED TO:  Joshua Bhatti MD  [facility] [address] [facility] [phone]    Call   As needed    Andre Hernandez MD  [address] [place] [address]  [phone]    Call in 1 day      [facility]) ED  [address] [facility]  [phone]  Go to   If symptoms worsen    DISCHARGE MEDICATIONS:  New Prescriptions    No medications on file          (Please note that portions of this note were completed with a voice recognition program.  Efforts were made to edit the dictations but occasionally words are mis-transcribed.)    Esvin Holloway DO (electronically signed)  Attending Emergency Physician          Esvin Holloway,   02/23/23 0918 [facility],   02/23/23 0934

## 2023-02-24 LAB
EKG ATRIAL RATE: 72 BPM
EKG P-R INTERVAL: 272 MS
EKG Q-T INTERVAL: 458 MS
EKG QRS DURATION: 130 MS
EKG QTC CALCULATION (BAZETT): 501 MS
EKG R AXIS: -58 DEGREES
EKG T AXIS: 59 DEGREES
EKG VENTRICULAR RATE: 72 BPM

## 2023-02-24 PROCEDURE — 93010 ELECTROCARDIOGRAM REPORT: CPT | Performed by: INTERNAL MEDICINE

## 2023-03-04 ENCOUNTER — HOSPITAL ENCOUNTER (EMERGENCY)
Age: 63
Discharge: HOME | End: 2023-03-04
Attending: EMERGENCY MEDICINE
Payer: COMMERCIAL

## 2023-03-04 ENCOUNTER — APPOINTMENT (OUTPATIENT)
Dept: GENERAL RADIOLOGY | Age: 63
End: 2023-03-04
Payer: COMMERCIAL

## 2023-03-04 VITALS
WEIGHT: 191 LBS | HEART RATE: 67 BPM | RESPIRATION RATE: 18 BRPM | HEIGHT: 67 IN | BODY MASS INDEX: 29.98 KG/M2 | SYSTOLIC BLOOD PRESSURE: 109 MMHG | OXYGEN SATURATION: 99 % | DIASTOLIC BLOOD PRESSURE: 52 MMHG

## 2023-03-04 DIAGNOSIS — R07.9 CHEST PAIN, UNSPECIFIED TYPE: Primary | ICD-10-CM

## 2023-03-04 LAB
ALBUMIN SERPL-MCNC: 3.7 G/DL (ref 3.5–4.6)
ALP BLD-CCNC: 168 U/L (ref 40–130)
ALT SERPL-CCNC: 23 U/L (ref 0–33)
ANION GAP SERPL CALCULATED.3IONS-SCNC: 12 MEQ/L (ref 9–15)
AST SERPL-CCNC: 30 U/L (ref 0–35)
BASOPHILS ABSOLUTE: 0.1 K/UL (ref 0–0.2)
BASOPHILS RELATIVE PERCENT: 0.8 %
BILIRUB SERPL-MCNC: 0.5 MG/DL (ref 0.2–0.7)
BUN BLDV-MCNC: 20 MG/DL (ref 8–23)
CALCIUM SERPL-MCNC: 9.5 MG/DL (ref 8.5–9.9)
CHLORIDE BLD-SCNC: 97 MEQ/L (ref 95–107)
CO2: 30 MEQ/L (ref 20–31)
CREAT SERPL-MCNC: 3.53 MG/DL (ref 0.5–0.9)
EKG ATRIAL RATE: 65 BPM
EKG P AXIS: 90 DEGREES
EKG P-R INTERVAL: 242 MS
EKG Q-T INTERVAL: 478 MS
EKG QRS DURATION: 126 MS
EKG QTC CALCULATION (BAZETT): 497 MS
EKG R AXIS: -57 DEGREES
EKG T AXIS: 51 DEGREES
EKG VENTRICULAR RATE: 65 BPM
EOSINOPHILS ABSOLUTE: 0.8 K/UL (ref 0–0.7)
EOSINOPHILS RELATIVE PERCENT: 9.8 %
GFR SERPL CREATININE-BSD FRML MDRD: 13.8 ML/MIN/{1.73_M2}
GLOBULIN: 3.6 G/DL (ref 2.3–3.5)
GLUCOSE BLD-MCNC: 169 MG/DL (ref 70–99)
HCT VFR BLD CALC: 35.7 % (ref 37–47)
HEMOGLOBIN: 12.4 G/DL (ref 12–16)
LYMPHOCYTES ABSOLUTE: 1.3 K/UL (ref 1–4.8)
LYMPHOCYTES RELATIVE PERCENT: 15.9 %
MAGNESIUM: 1.9 MG/DL (ref 1.7–2.4)
MCH RBC QN AUTO: 30.1 PG (ref 27–31.3)
MCHC RBC AUTO-ENTMCNC: 34.6 % (ref 33–37)
MCV RBC AUTO: 87 FL (ref 79.4–94.8)
MONOCYTES ABSOLUTE: 0.6 K/UL (ref 0.2–0.8)
MONOCYTES RELATIVE PERCENT: 7 %
NEUTROPHILS ABSOLUTE: 5.4 K/UL (ref 1.4–6.5)
NEUTROPHILS RELATIVE PERCENT: 66.5 %
PDW BLD-RTO: 16.4 % (ref 11.5–14.5)
PLATELET # BLD: 136 K/UL (ref 130–400)
POTASSIUM SERPL-SCNC: 3.8 MEQ/L (ref 3.4–4.9)
RBC # BLD: 4.11 M/UL (ref 4.2–5.4)
SODIUM BLD-SCNC: 139 MEQ/L (ref 135–144)
TOTAL PROTEIN: 7.3 G/DL (ref 6.3–8)
TROPONIN: 0.04 NG/ML (ref 0–0.01)
WBC # BLD: 8.2 K/UL (ref 4.8–10.8)

## 2023-03-04 PROCEDURE — 85025 COMPLETE CBC W/AUTO DIFF WBC: CPT

## 2023-03-04 PROCEDURE — 80053 COMPREHEN METABOLIC PANEL: CPT

## 2023-03-04 PROCEDURE — 83735 ASSAY OF MAGNESIUM: CPT

## 2023-03-04 PROCEDURE — 99285 EMERGENCY DEPT VISIT HI MDM: CPT

## 2023-03-04 PROCEDURE — 6360000002 HC RX W HCPCS: Performed by: EMERGENCY MEDICINE

## 2023-03-04 PROCEDURE — 96375 TX/PRO/DX INJ NEW DRUG ADDON: CPT

## 2023-03-04 PROCEDURE — 36415 COLL VENOUS BLD VENIPUNCTURE: CPT

## 2023-03-04 PROCEDURE — 93005 ELECTROCARDIOGRAM TRACING: CPT | Performed by: EMERGENCY MEDICINE

## 2023-03-04 PROCEDURE — 84484 ASSAY OF TROPONIN QUANT: CPT

## 2023-03-04 PROCEDURE — 71045 X-RAY EXAM CHEST 1 VIEW: CPT

## 2023-03-04 PROCEDURE — 96374 THER/PROPH/DIAG INJ IV PUSH: CPT

## 2023-03-04 RX ORDER — MORPHINE SULFATE 4 MG/ML
4 INJECTION, SOLUTION INTRAMUSCULAR; INTRAVENOUS
Status: COMPLETED | OUTPATIENT
Start: 2023-03-04 | End: 2023-03-04

## 2023-03-04 RX ORDER — ONDANSETRON 2 MG/ML
4 INJECTION INTRAMUSCULAR; INTRAVENOUS ONCE
Status: COMPLETED | OUTPATIENT
Start: 2023-03-04 | End: 2023-03-04

## 2023-03-04 RX ADMIN — ONDANSETRON 4 MG: 2 INJECTION INTRAMUSCULAR; INTRAVENOUS at 14:22

## 2023-03-04 RX ADMIN — MORPHINE SULFATE 4 MG: 4 INJECTION, SOLUTION INTRAMUSCULAR; INTRAVENOUS at 14:22

## 2023-03-04 ASSESSMENT — PAIN SCALES - GENERAL
PAINLEVEL_OUTOF10: 4
PAINLEVEL_OUTOF10: 2
PAINLEVEL_OUTOF10: 4

## 2023-03-04 ASSESSMENT — ENCOUNTER SYMPTOMS
ABDOMINAL PAIN: 0
SORE THROAT: 0
COUGH: 0
VOMITING: 0
DIARRHEA: 0
BACK PAIN: 0
NAUSEA: 0
SHORTNESS OF BREATH: 0

## 2023-03-04 ASSESSMENT — PAIN DESCRIPTION - LOCATION
LOCATION: CHEST
LOCATION: CHEST

## 2023-03-04 ASSESSMENT — PAIN DESCRIPTION - DESCRIPTORS: DESCRIPTORS: PRESSURE

## 2023-03-04 ASSESSMENT — PAIN - FUNCTIONAL ASSESSMENT: PAIN_FUNCTIONAL_ASSESSMENT: 0-10

## 2023-03-04 NOTE — ED TRIAGE NOTES
Pt presents to ER via EMS from dialysis. Pt was receiving dialysis and began to have tight pressure in the chest. Pt states pain is 4/10, pt has SOB and has headache. Pt got 1 nitro at dialysis and only received one due to BP dropping. Pt stated that the nitro helped some. Pt is A&Ox4, warm and dry at this time.

## 2023-03-04 NOTE — ED NOTES
Pt stated she wants the dr to call her daughter for an update, Dr. Jannie Brittle notified      Taras Macedo RN  03/04/23 1706

## 2023-03-04 NOTE — ED NOTES
Pt tried to call daughter, no answer but stated she is picking her up after she runs errands      Marie Mccullough, FLAQUITO  03/04/23 0309

## 2023-03-04 NOTE — ED PROVIDER NOTES
[facility] ED  eMERGENCYdEPARTMENT eNCOUnter      Pt Name: Alexandrea Cruz  MRN: [id number]  [place] 1960  Date of evaluation: 3/4/2023  Katelynn Johnson MD    CHIEF COMPLAINT           HPI  Alexandrea Cruz is a 70 y.o. female per chart review has a h/o CAD s/p PCI, depression/anxiety, ESRD on dilaysis TTS, COPD, DM II, HTN, hpl presents to the ED with chest pain. Pt notes gradual onset, moderate, intermittent, substernal chest pain since this am.  This am it lasted for 1 hour then resolved. Chest pain started again 30 min ago at dialysis. Pt denies fever, n/v, sob, ab pain, dysuria, diarrhea. Pt recently admitted for chest pain. Cath showed 100% mid LAD lesion. Pt with non obstructive CAD. Cardiology recommended medical management. ROS  Review of Systems   Constitutional:  Negative for activity change, chills and fever. HENT:  Negative for ear pain and sore throat. Eyes:  Negative for visual disturbance. Respiratory:  Negative for cough and shortness of breath. Cardiovascular:  Positive for chest pain. Negative for palpitations and leg swelling. Gastrointestinal:  Negative for abdominal pain, diarrhea, nausea and vomiting. Genitourinary:  Negative for dysuria. Musculoskeletal:  Negative for back pain. Skin:  Negative for rash. Neurological:  Negative for dizziness and weakness. Except as noted above the remainder of the review of systems was reviewed and negative.        PAST MEDICAL HISTORY     Past Medical History:   Diagnosis Date    Angina at rest [facility]) 5/24/2020    Anxiety     CAD S/P percutaneous coronary angioplasty 2015, 2018    stents per dr Zaria Kennedy    CHF (congestive heart failure) ([address]     CKD (chronic kidney disease) stage 4, GFR 15-29 ml/min ([address] 2/24/2018    CKD stage 4 due to type 2 diabetes mellitus ([address]     Contusion of right chest wall 2/16/2021    COPD (chronic obstructive pulmonary disease) ([address]     Diabetic nephropathy with proteinuria ([address]     DJD (degenerative joint disease) of knee     Dr Elia Kauffman    GERD (gastroesophageal reflux disease)     Hemiparesis, left ([address]     entered Assisted Living ([facility])    Hemodialysis patient [facility])     Hemodialysis-associated hypotension 10/22/2021    History of heart failure     History of seizures     History of type C viral hepatitis     HTN (hypertension)     Hyperlipidemia     Impaired mobility and activities of daily living     Infection of venous access port 2022    Mediastinal lymphadenopathy     Dr Pauly De Jesus    Metabolic syndrome     Moderate persistent asthma without complication     Need for extended care facility 2021    Neurogenic urinary incontinence 2013    Neuropathy in diabetes ([address]     Nonrheumatic mitral valve regurgitation 2021    Nonrheumatic tricuspid valve regurgitation 2021    Obesity (BMI 30-39. 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 Peterson Branch      x1 Dr. Arlen Snider, Dr Domi Poe  08/10/2021    [facility]    DIAGNOSTIC CARDIAC CATH LAB PROCEDURE  10/02/2019    DIALYSIS CATHETER INSERTION Left 2022    Tunneled Symetrex 15.5F x 23cm hemodialysis catheter inserted by Dr. Christen Hatchet Left 07/18/2022    [id number] replamcent tunneld HD cath by Dr. Tam Alvares, TOTAL ABDOMINAL (CERVIX REMOVED)      one ovary intact, Dr Susan Soares, menorrhagia    IR THROMBECTOMY PERCUT AVF  06/06/2022    IR THROMBECTOMY PERCUT AVF 6/6/2022 [facility] PROCEDURE    IR TUNNELED CATHETER PLACEMENT GREATER THAN 5 YEARS  06/03/2022    IR TUNNELED CATHETER PLACEMENT GREATER THAN 5 YEARS 6/3/2022 [facility] PROCED[facility]    IR TUNNELED CATHETER PLACEMENT GREATER THAN 5 YEARS Left 07/07/2022 7/18/22 Dr. Melissa Edgar exchanged to 15.5F x 28 cm.      15.5 fr by 23 cm Symetrex dialysis catheter inserted by Dr Melissa Edgar    IR TUNNELED [address] 5 YEARS  07/07/2022    IR TUNNELED CATHETER PLACEMENT GREATER THAN 5 YEARS 7/7/2022 [facility] PROCEDURE    [place] ARTHRP KNE CONDYLE&PLATU MEDIAL&LAT COMPARTMENTS Left 06/21/2018    LEFT KNEE TOTAL KNEE ARTHROPLASTY, SHAYNA, NERVE BLOCK performed by Jacqueline Kurtz MD at [address] [place], [place] [address]Third Floor Right     TOTAL KNEE ARTHROPLASTY  05/19/2016    Dr Karen Chung    TUNNELED [address] Right 07/01/2020    tunneled HD catheter per Dr Kevin Urrutia       Previous Medications    ACETAMINOPHEN (TYLENOL) 325 MG TABLET    Take 2 tablets by mouth every 4 hours as needed for Pain (For mild to moderate pain (Pain 1-6 out of 10 on pain scale))    ALBUTEROL (PROVENTIL) 2.5 MG/0.5ML NEBU NEBULIZER SOLUTION    Take 2.5 mg by nebulization every 4 hours as needed for Wheezing or Shortness of Breath    ALCOHOL SWABS (EASY TOUCH ALCOHOL PREP MEDIUM) 70 % PADS    USE AS DIRECTED THREE TIMES A DAY    ARIPIPRAZOLE (ABILIFY) 5 MG TABLET    TAKE 1 TABLET BY MOUTH ONCE DAILY    ASPIRIN EC 81 MG EC TABLET    Take 1 tablet by mouth daily    ATORVASTATIN (LIPITOR) 40 MG TABLET    Take 1 tablet by mouth nightly    B COMPLEX-C-FOLIC ACID (NEPHROCAPS) 1 MG CAPSULE    Take 1 capsule by mouth daily Indications: Dialysis Dependent Chronic Kidney Failure    BLOOD GLUCOSE MONITORING SUPPL (FREESTYLE LITE) CORETTA    1 Device by Does not apply route daily as needed (Diabetes) Use freestyle meter to test blood sugar as needed    BLOOD GLUCOSE MONITORING SUPPL (ONETOUCH VERIO) W/DEVICE KIT    AS DIRECTED    BLOOD GLUCOSE TEST STRIPS (FREESTYLE LITE) STRIP    1 each by Does not apply route 4 times daily (before meals and nightly) As needed. BLOOD GLUCOSE TEST STRIPS (ONETOUCH VERIO) STRIP    QID    FREESTYLE LANCETS MISC    Test 4x daily    HANDICAP PLACARD MISC    by Does not apply route Expiration in 5 years. HYDROXYZINE HCL (ATARAX) 25 MG TABLET    Take 25 mg by mouth every 8 hours as needed for Itching    INSULIN LISPRO (HUMALOG) 100 UNIT/ML INJECTION VIAL    Inject 12 Units into the skin 3 times daily (before meals) Indications: Type 2 Diabetes    INSULIN LISPRO (HUMALOG) 100 UNIT/ML INJECTION VIAL    Inject 0-10 Units into the skin 3 times daily (before meals) Indications: Type 2 Diabetes Inject as per sliding scale: If 151-200=2u; 201-250-4u; 251-300=6u; 301-350=8u; 351-400=10u. Call MD/NP if >400. INSULIN LISPRO (HUMALOG) 100 UNIT/ML INJECTION VIAL    Inject 0-4 Units into the skin nightly Indications: Type 2 Diabetes Inject as per sliding scale: If 201-250=1u; 251-300=2u; 301-350=3u; 351-400=4u. Call MD/NP if >400.     LACTOBACILLUS PO    Take 2 capsules by mouth 2 times daily Indications: Nutritional Support    LANTUS SOLOSTAR 100 UNIT/ML INJECTION PEN    INJECT 55 UNITS SUBCUTANEOUSLY AT BEDTIME    LOPERAMIDE (IMODIUM A-D) 2 MG TABLET    Take 2 mg by mouth every 6 hours as needed for Diarrhea Indications: Diarrhea    MELATONIN 3 MG TABS TABLET    Take 10 mg by mouth nightly as needed    METOPROLOL TARTRATE (LOPRESSOR) 25 MG TABLET    Take 0.5 tablets by mouth 2 times daily HOLD for SBP<100 or HR<60    MIDODRINE (PROAMATINE) 5 MG TABLET    Take 1 tablet by mouth one time a day every Tue, Thu, Sat for hypotension. Give 30 mins prior to dialysis. NITROGLYCERIN (NITROSTAT) 0.4 MG SL TABLET    up to max of 3 total doses. If no relief after 1 dose, call 911. MJTOUCH DELICA LANCETS 74V MISC    QID    PANTOPRAZOLE (PROTONIX) 20 MG TABLET    Take 20 mg by mouth daily Indications: Gastroesophageal Reflux Disease    SERTRALINE (ZOLOFT) 50 MG TABLET    Take 1 tablet by mouth nightly    SURE COMFORT PEN NEEDLES 30G X 8 MM MISC    USE AS DIRECTED FIVE TIMES A DAILY    TRAMADOL (ULTRAM) 50 MG TABLET    Take 50 mg by mouth every 6 hours as needed for Pain. ALLERGIES     Codeine and Oxycontin [oxycodone hcl]    FAMILY HISTORY       Family History   Problem Relation Age of Onset    Cancer Mother 76        survived    Breast Cancer Mother     Hypertension Father     Diabetes Sister     Mental Illness Sister     Colon Cancer Neg Hx           SOCIAL HISTORY       Social History     Socioeconomic History    Marital status:     Number of children: 2   Occupational History    Occupation: disabled   Tobacco Use    Smoking status: Never    Smokeless tobacco: Never   Vaping Use    Vaping Use: Never used   Substance and Sexual Activity    Alcohol use: No     Alcohol/week: 0.0 standard drinks    Drug use: No    Sexual activity: Not Currently   Social History Narrative    Disabled dt depression and low back pain, lives in [facility] Tani Orlando is close by and is her paid caregiver via waiver services    HD via [employer], [id number], PETÄJÄVESI, Sat. Son is in the home and has CP and is total care-and is also cared for by a waiver by Arlet Lemus.     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 updates; patient lives with her daughter son-in-law and 2 grandchildren and patient's handicapped son. Per daughter, her brother is blind, MRDD, CP multiple health issues. Daughter's  is patient's legal guardian. Patient has hemodialysis Tuesday Thursday and Saturday. Patient's bedroom is on main floor with a half bath. Daughter walks patient upstairs once weekly for full bath. Patient is using her walker in the home. Patient has a hospital bed in the home. Social Determinants of Health     Financial Resource Strain: Low Risk     Difficulty of Paying Living Expenses: Not hard at all   Food Insecurity: No Food Insecurity    Worried About [address] in the Last Year: Never true    [address] [place] in the Last Year: Never true   Transportation Needs: No Transportation Needs    Lack of Transportation (Medical): No    Lack of Transportation (Non-Medical): No   Physical Activity: Sufficiently Active    Days of Exercise per Week: 3 days    Minutes of Exercise per Session: 60 min         PHYSICAL EXAM       ED Triage Vitals [03/04/23 1357]   BP Temp Temp src Heart Rate Resp SpO2 Height Weight   (!) 109/52 -- -- 64 16 98 % 5' 7\" (1.702 m) 191 lb (86.6 kg)       Physical Exam  Vitals and nursing note reviewed. Constitutional:       Appearance: She is well-developed. HENT:      Head: Normocephalic. Right Ear: External ear normal.      Left Ear: External ear normal.   Eyes:      Conjunctiva/sclera: Conjunctivae normal.      Pupils: Pupils are equal, round, and reactive to light. Cardiovascular:      Rate and Rhythm: Normal rate and regular rhythm. Heart sounds: Normal heart sounds.    Pulmonary:      Effort: Pulmonary effort is normal. Breath sounds: Normal breath sounds. Abdominal:      General: Bowel sounds are normal. There is no distension. Palpations: Abdomen is soft. Tenderness: There is no abdominal tenderness. Musculoskeletal:         General: Normal range of motion. Cervical back: Normal range of motion and neck supple. Skin:     General: Skin is warm and dry. Neurological:      Mental Status: She is alert and oriented to person, place, and time. Psychiatric:         Mood and Affect: Mood normal.         MDM  69 yo female presents to the ED with chest pain. Pt is afebrile, hemodynamically stable. Pt given IV morphine, IV zofran in the ED. EKG shows NSR with HR 65, RBBB, LAFB, normal axis, normal intervals, no ST changes. Labs remarkable for Cr 3.53, troponin 0.044. CXR negative. Cath report reviewed from 2/15. Pt with 100% chronic occlusion of LAD with patent LIMA to LAD graft. Cardiologist recommended medical management. Case discussed with Dr. Maryjane Vargas (cardiology) and we both believed that pt was stable to go home. Pt with chronically elevated troponin due to ESRD. Pt is currently chest pain free. Pt nontoxic and tolerating PO water and ice cream.  Pt has nitro at home. Pt given chest pain warning signs and will f/u with cardiology. FINAL IMPRESSION      1.  Chest pain, unspecified type          DISPOSITION/PLAN   DISPOSITION Decision To Discharge 03/04/2023 04:26:57 PM        DISCHARGE MEDICATIONS:  [unfilled]         Iglesia Higgins MD(electronically signed)  Attending Emergency Physician           Iglesia Higgins MD  03/04/23 2155

## 2023-03-06 ENCOUNTER — TELEPHONE (OUTPATIENT)
Dept: CARDIOLOGY CLINIC | Age: 63
End: 2023-03-06

## 2023-03-06 LAB
EKG ATRIAL RATE: 65 BPM
EKG P AXIS: 90 DEGREES
EKG P-R INTERVAL: 242 MS
EKG Q-T INTERVAL: 478 MS
EKG QRS DURATION: 126 MS
EKG QTC CALCULATION (BAZETT): 497 MS
EKG R AXIS: -57 DEGREES
EKG T AXIS: 51 DEGREES
EKG VENTRICULAR RATE: 65 BPM

## 2023-03-06 PROCEDURE — 93010 ELECTROCARDIOGRAM REPORT: CPT | Performed by: INTERNAL MEDICINE

## 2023-03-06 NOTE — TELEPHONE ENCOUNTER
PATIENT HAS F/U FROM HOSPITAL THIS WEEK, BUT IS STILL HAVING REOCCURRING CHEST PAIN. PATIENT WOULD LIKE TO GO BACK ON [facility]. SHE STILL HAS MEDS BECAUSE SHE WAS PREVIOUSLY ON IT.     PLEASE ADVISE

## 2023-03-08 ENCOUNTER — HOSPITAL ENCOUNTER (EMERGENCY)
Age: 63
Discharge: HOME | End: 2023-03-08
Attending: EMERGENCY MEDICINE
Payer: COMMERCIAL

## 2023-03-08 ENCOUNTER — TELEPHONE (OUTPATIENT)
Dept: CARDIOLOGY CLINIC | Age: 63
End: 2023-03-08

## 2023-03-08 ENCOUNTER — APPOINTMENT (OUTPATIENT)
Dept: GENERAL RADIOLOGY | Age: 63
End: 2023-03-08
Payer: COMMERCIAL

## 2023-03-08 VITALS
WEIGHT: 191 LBS | DIASTOLIC BLOOD PRESSURE: 69 MMHG | TEMPERATURE: 96.8 F | HEART RATE: 88 BPM | BODY MASS INDEX: 29.98 KG/M2 | RESPIRATION RATE: 14 BRPM | HEIGHT: 67 IN | SYSTOLIC BLOOD PRESSURE: 134 MMHG | OXYGEN SATURATION: 92 %

## 2023-03-08 DIAGNOSIS — J44.1 COPD EXACERBATION (HCC): Primary | ICD-10-CM

## 2023-03-08 LAB
ALBUMIN SERPL-MCNC: 3.6 G/DL (ref 3.5–4.6)
ALP BLD-CCNC: 154 U/L (ref 40–130)
ALT SERPL-CCNC: 22 U/L (ref 0–33)
ANION GAP SERPL CALCULATED.3IONS-SCNC: 12 MEQ/L (ref 9–15)
AST SERPL-CCNC: 28 U/L (ref 0–35)
BASOPHILS ABSOLUTE: 0 K/UL (ref 0–0.2)
BASOPHILS RELATIVE PERCENT: 0.5 %
BILIRUB SERPL-MCNC: 0.3 MG/DL (ref 0.2–0.7)
BUN BLDV-MCNC: 40 MG/DL (ref 8–23)
CALCIUM SERPL-MCNC: 9.3 MG/DL (ref 8.5–9.9)
CHLORIDE BLD-SCNC: 97 MEQ/L (ref 95–107)
CO2: 30 MEQ/L (ref 20–31)
CREAT SERPL-MCNC: 5.61 MG/DL (ref 0.5–0.9)
EOSINOPHILS ABSOLUTE: 0.7 K/UL (ref 0–0.7)
EOSINOPHILS RELATIVE PERCENT: 8.8 %
GFR SERPL CREATININE-BSD FRML MDRD: 7.9 ML/MIN/{1.73_M2}
GLOBULIN: 3.4 G/DL (ref 2.3–3.5)
GLUCOSE BLD-MCNC: 242 MG/DL (ref 70–99)
HCT VFR BLD CALC: 31.9 % (ref 37–47)
HEMOGLOBIN: 10.8 G/DL (ref 12–16)
LYMPHOCYTES ABSOLUTE: 2.3 K/UL (ref 1–4.8)
LYMPHOCYTES RELATIVE PERCENT: 29.4 %
MCH RBC QN AUTO: 29.9 PG (ref 27–31.3)
MCHC RBC AUTO-ENTMCNC: 33.9 % (ref 33–37)
MCV RBC AUTO: 88.2 FL (ref 79.4–94.8)
MONOCYTES ABSOLUTE: 0.4 K/UL (ref 0.2–0.8)
MONOCYTES RELATIVE PERCENT: 5.5 %
NEUTROPHILS ABSOLUTE: 4.4 K/UL (ref 1.4–6.5)
NEUTROPHILS RELATIVE PERCENT: 55.8 %
PDW BLD-RTO: 16.3 % (ref 11.5–14.5)
PLATELET # BLD: 170 K/UL (ref 130–400)
POTASSIUM SERPL-SCNC: 4.4 MEQ/L (ref 3.4–4.9)
RBC # BLD: 3.62 M/UL (ref 4.2–5.4)
SODIUM BLD-SCNC: 139 MEQ/L (ref 135–144)
TOTAL PROTEIN: 7 G/DL (ref 6.3–8)
TROPONIN: 0.04 NG/ML (ref 0–0.01)
WBC # BLD: 7.8 K/UL (ref 4.8–10.8)

## 2023-03-08 PROCEDURE — 80053 COMPREHEN METABOLIC PANEL: CPT

## 2023-03-08 PROCEDURE — 94640 AIRWAY INHALATION TREATMENT: CPT

## 2023-03-08 PROCEDURE — 93005 ELECTROCARDIOGRAM TRACING: CPT | Performed by: EMERGENCY MEDICINE

## 2023-03-08 PROCEDURE — 6360000002 HC RX W HCPCS: Performed by: EMERGENCY MEDICINE

## 2023-03-08 PROCEDURE — 36415 COLL VENOUS BLD VENIPUNCTURE: CPT

## 2023-03-08 PROCEDURE — 85025 COMPLETE CBC W/AUTO DIFF WBC: CPT

## 2023-03-08 PROCEDURE — 71045 X-RAY EXAM CHEST 1 VIEW: CPT

## 2023-03-08 PROCEDURE — 6370000000 HC RX 637 (ALT 250 FOR IP): Performed by: EMERGENCY MEDICINE

## 2023-03-08 PROCEDURE — 84484 ASSAY OF TROPONIN QUANT: CPT

## 2023-03-08 PROCEDURE — 94761 N-INVAS EAR/PLS OXIMETRY MLT: CPT

## 2023-03-08 RX ORDER — PREDNISONE 20 MG/1
40 TABLET ORAL DAILY
Qty: 10 TABLET | Refills: 0 | Status: SHIPPED | OUTPATIENT
Start: 2023-03-08 | End: 2023-03-13

## 2023-03-08 RX ORDER — METHYLPREDNISOLONE SODIUM SUCCINATE 125 MG/2ML
125 INJECTION, POWDER, LYOPHILIZED, FOR SOLUTION INTRAMUSCULAR; INTRAVENOUS ONCE
Status: COMPLETED | OUTPATIENT
Start: 2023-03-08 | End: 2023-03-08

## 2023-03-08 RX ORDER — IPRATROPIUM BROMIDE AND ALBUTEROL SULFATE 2.5; .5 MG/3ML; MG/3ML
1 SOLUTION RESPIRATORY (INHALATION) CONTINUOUS PRN
Status: DISCONTINUED | OUTPATIENT
Start: 2023-03-08 | End: 2023-03-09 | Stop reason: HOSPADM

## 2023-03-08 RX ADMIN — IPRATROPIUM BROMIDE AND ALBUTEROL SULFATE 1 AMPULE: .5; 2.5 SOLUTION RESPIRATORY (INHALATION) at 21:17

## 2023-03-08 RX ADMIN — METHYLPREDNISOLONE SODIUM SUCCINATE 125 MG: 125 INJECTION, POWDER, FOR SOLUTION INTRAMUSCULAR; INTRAVENOUS at 21:09

## 2023-03-08 RX ADMIN — IPRATROPIUM BROMIDE AND ALBUTEROL SULFATE 1 AMPULE: .5; 2.5 SOLUTION RESPIRATORY (INHALATION) at 21:23

## 2023-03-08 ASSESSMENT — PAIN DESCRIPTION - ORIENTATION: ORIENTATION: MID

## 2023-03-08 ASSESSMENT — ENCOUNTER SYMPTOMS
COUGH: 0
PHOTOPHOBIA: 0
WHEEZING: 0
ABDOMINAL PAIN: 0
SHORTNESS OF BREATH: 0
EYE DISCHARGE: 0
CHEST TIGHTNESS: 0
SORE THROAT: 0
VOMITING: 0
ABDOMINAL DISTENTION: 0

## 2023-03-08 ASSESSMENT — PAIN SCALES - GENERAL: PAINLEVEL_OUTOF10: 6

## 2023-03-08 ASSESSMENT — PAIN DESCRIPTION - DESCRIPTORS: DESCRIPTORS: PRESSURE

## 2023-03-08 ASSESSMENT — PAIN DESCRIPTION - LOCATION: LOCATION: CHEST

## 2023-03-08 ASSESSMENT — PAIN - FUNCTIONAL ASSESSMENT: PAIN_FUNCTIONAL_ASSESSMENT: 0-10

## 2023-03-09 NOTE — ED NOTES
Pt presents to ER with midsternal chest pain ongoing for 1 hour. Pt took 1 nitro at approx 1850. Pt states pain increases on inspiration.       Sonny Banegas, EMT-P  03/08/23 0676

## 2023-03-09 NOTE — ED PROVIDER NOTES
[address] [facility] ED  eMERGENCY dEPARTMENT eNCOUnter      Pt Name: Polina Gardiner  MRN: [id number]  [place] 1960  Date of evaluation: 3/8/2023  Provider: Zaid Cormier MD    CHIEF COMPLAINT       Chief Complaint   Patient presents with    Chest Pain     X1 hour         HISTORY OF PRESENT ILLNESS   (Location/Symptom, Timing/Onset,Context/Setting, Quality, Duration, Modifying Factors, Severity)  Note limiting factors. Polina Gardiner is a 70 y.o. female who presents to the emergency department for evaluation of chest pain. Patient had chest pain developed 1 hour prior to arrival nonexertional.  Last dialysis yesterday. She has been having problems of recurrent chest pain and recent heart catheterization in the past month mild disease with recommended medical management only. She denies nausea vomiting, diaphoresis, shortness of breath or other associated features. 1 nitro without relief. HPI    NursingNotes were reviewed. REVIEW OF SYSTEMS    (2-9 systems for level 4, 10 or more for level 5)     Review of Systems   Constitutional:  Negative for chills and diaphoresis. HENT:  Negative for congestion, ear pain, mouth sores and sore throat. Eyes:  Negative for photophobia and discharge. Respiratory:  Negative for cough, chest tightness, shortness of breath and wheezing. Cardiovascular:  Positive for chest pain. Negative for palpitations. Gastrointestinal:  Negative for abdominal distention, abdominal pain and vomiting. Endocrine: Negative for cold intolerance. Genitourinary:  Negative for difficulty urinating. Musculoskeletal:  Negative for arthralgias. Skin:  Negative for pallor and rash. Allergic/Immunologic: Negative for immunocompromised state. Neurological:  Negative for dizziness and syncope. Hematological:  Negative for adenopathy. Psychiatric/Behavioral:  Negative for agitation and hallucinations. All other systems reviewed and are negative.     Except as noted above the remainder of the review of systems was reviewed and negative. PAST MEDICAL HISTORY     Past Medical History:   Diagnosis Date    Angina at rest [facility]) 5/24/2020    Anxiety     CAD S/P percutaneous coronary angioplasty 2015, 2018    stents per dr Asha Bloom    CHF (congestive heart failure) ([address]     CKD (chronic kidney disease) stage 4, GFR 15-29 ml/min ([address] 2/24/2018    CKD stage 4 due to type 2 diabetes mellitus ([address]     Contusion of right chest wall 2/16/2021    COPD (chronic obstructive pulmonary disease) ([address]     Diabetic nephropathy with proteinuria ([address] 2014    DJD (degenerative joint disease) of knee     Dr Kacey Ortiz    GERD (gastroesophageal reflux disease)     Hemiparesis, left ([address] 2013    entered Assisted Living ([facility])    Hemodialysis patient [facility])     Hemodialysis-associated hypotension 10/22/2021    History of heart failure     History of seizures     History of type C viral hepatitis     HTN (hypertension)     Hyperlipidemia     Impaired mobility and activities of daily living     Infection of venous access port 7/29/2022    Mediastinal lymphadenopathy 2013    Dr Coker Quiet    Metabolic syndrome     Moderate persistent asthma without complication 4/09/2046    Need for extended care facility 7/7/2021    Neurogenic urinary incontinence 2013    Neuropathy in diabetes ([address]     Nonrheumatic mitral valve regurgitation 7/7/2021    Nonrheumatic tricuspid valve regurgitation 7/7/2021    Obesity (BMI 30-39. 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 Morteza Calvin      x1 Dr. Radha Hedrick, Dr Selin Bean  08/10/2021    [facility]    DIAGNOSTIC CARDIAC CATH LAB PROCEDURE  10/02/2019    DIALYSIS CATHETER INSERTION Left 2022    Tunneled Symetrex 15.5F x 23cm hemodialysis catheter inserted by Dr. Indigo Encinas Left 2022    15.0Pe72sm replamcent tunneld HD cath by Dr. Rico Ward, TOTAL ABDOMINAL (CERVIX REMOVED)      one ovary intact, Dr Mike Dixon, menorrhagia    IR THROMBECTOMY PERCUT AVF  2022    IR THROMBECTOMY PERCUT AVF 2022 [facility] PROCEDURE    IR TUNNELED CATHETER PLACEMENT GREATER THAN 5 YEARS  2022    IR TUNNELED CATHETER PLACEMENT GREATER THAN 5 YEARS 6/3/2022 [facility] PROCEDURE    IR TUNNELED CATHETER PLACEMENT GREATER THAN 5 YEARS Left 2022 Dr. Tracey Bhat exchanged to 15.5F x 28 cm.      15.5 fr by 23 cm Symetrex dialysis catheter inserted by Dr Tracey Bhat    IR TUNNELED Donivan Kirsten 5 YEARS  2022    IR TUNNELED CATHETER PLACEMENT GREATER THAN 5 YEARS 2022 [facility] SPECIAL PROCEDURE    [place] ARTHRP KNE CONDYLE&PLATU MEDIAL&LAT COMPARTMENTS Left 2018    LEFT KNEE TOTAL KNEE ARTHROPLASTY, SHAYNA, NERVE BLOCK performed by Farhat Ponce MD at [address] [place], [place] [address]Third Floor Right     TOTAL KNEE ARTHROPLASTY  2016    Dr Mae Matthews    TUNNELED [address] Right 2020    tunneled HD catheter per Dr Dayton Bridges       Previous Medications    ACETAMINOPHEN (TYLENOL) 325 MG TABLET    Take 2 tablets by mouth every 4 hours as needed for Pain (For mild to moderate pain (Pain 1-6 out of 10 on pain scale))    ALBUTEROL (PROVENTIL) 2.5 MG/0.5ML NEBU NEBULIZER SOLUTION    Take 2.5 mg by nebulization every 4 hours as needed for Wheezing or Shortness of Breath    ALCOHOL SWABS (EASY TOUCH ALCOHOL PREP MEDIUM) 70 % PADS    USE AS DIRECTED THREE TIMES A DAY    ARIPIPRAZOLE (ABILIFY) 5 MG TABLET    TAKE 1 TABLET BY MOUTH ONCE DAILY    ASPIRIN EC 81 MG EC TABLET    Take 1 tablet by mouth daily    ATORVASTATIN (LIPITOR) 40 MG TABLET    Take 1 tablet by mouth nightly    B COMPLEX-C-FOLIC ACID (NEPHROCAPS) 1 MG CAPSULE    Take 1 capsule by mouth daily Indications: Dialysis Dependent Chronic Kidney Failure    BLOOD GLUCOSE MONITORING SUPPL (FREESTYLE LITE) CORETTA    1 Device by Does not apply route daily as needed (Diabetes) Use freestyle meter to test blood sugar as needed    BLOOD GLUCOSE MONITORING SUPPL (ONETOUCH VERIO) W/DEVICE KIT    AS DIRECTED    BLOOD GLUCOSE TEST STRIPS (FREESTYLE LITE) STRIP    1 each by Does not apply route 4 times daily (before meals and nightly) As needed. BLOOD GLUCOSE TEST STRIPS (ONETOUCH VERIO) STRIP    QID    FREESTYLE LANCETS MISC    Test 4x daily    HANDICAP PLACARD MISC    by Does not apply route Expiration in 5 years. HYDROXYZINE HCL (ATARAX) 25 MG TABLET    Take 25 mg by mouth every 8 hours as needed for Itching    INSULIN LISPRO (HUMALOG) 100 UNIT/ML INJECTION VIAL    Inject 12 Units into the skin 3 times daily (before meals) Indications: Type 2 Diabetes    INSULIN LISPRO (HUMALOG) 100 UNIT/ML INJECTION VIAL    Inject 0-10 Units into the skin 3 times daily (before meals) Indications: Type 2 Diabetes Inject as per sliding scale: If 151-200=2u; 201-250-4u; 251-300=6u; 301-350=8u; 351-400=10u. Call MD/NP if >400. INSULIN LISPRO (HUMALOG) 100 UNIT/ML INJECTION VIAL    Inject 0-4 Units into the skin nightly Indications: Type 2 Diabetes Inject as per sliding scale:  If 201-250=1u; 251-300=2u; 301-350=3u; 351-400=4u. Call MD/NP if >400. LACTOBACILLUS PO    Take 2 capsules by mouth 2 times daily Indications: Nutritional Support    LANTUS SOLOSTAR 100 UNIT/ML INJECTION PEN    INJECT 55 UNITS SUBCUTANEOUSLY AT BEDTIME    LOPERAMIDE (IMODIUM A-D) 2 MG TABLET    Take 2 mg by mouth every 6 hours as needed for Diarrhea Indications: Diarrhea    MELATONIN 3 MG TABS TABLET    Take 10 mg by mouth nightly as needed    METOPROLOL TARTRATE (LOPRESSOR) 25 MG TABLET    Take 0.5 tablets by mouth 2 times daily HOLD for SBP<100 or HR<60    MIDODRINE (PROAMATINE) 5 MG TABLET    Take 1 tablet by mouth one time a day every Tue, Thu, Sat for hypotension. Give 30 mins prior to dialysis. NITROGLYCERIN (NITROSTAT) 0.4 MG SL TABLET    up to max of 3 total doses. If no relief after 1 dose, call 911. ONETOUCH DELICA LANCETS 11A MISC    QID    PANTOPRAZOLE (PROTONIX) 20 MG TABLET    Take 20 mg by mouth daily Indications: Gastroesophageal Reflux Disease    SERTRALINE (ZOLOFT) 50 MG TABLET    Take 1 tablet by mouth nightly    SURE COMFORT PEN NEEDLES 30G X 8 MM MISC    USE AS DIRECTED FIVE TIMES A DAILY    TRAMADOL (ULTRAM) 50 MG TABLET    Take 50 mg by mouth every 6 hours as needed for Pain.        ALLERGIES     Codeine and Oxycontin [oxycodone hcl]    FAMILY HISTORY       Family History   Problem Relation Age of Onset    Cancer Mother 76        survived    Breast Cancer Mother     Hypertension Father     Diabetes Sister     Mental Illness Sister     Colon Cancer Neg Hx           SOCIAL HISTORY       Social History     Socioeconomic History    Marital status:      Spouse name: None    Number of children: 2    Years of education: None    Highest education level: None   Occupational History    Occupation: disabled   Tobacco Use    Smoking status: Never    Smokeless tobacco: Never   Vaping Use    Vaping Use: Never used   Substance and Sexual Activity    Alcohol use: No     Alcohol/week: 0.0 standard drinks    Drug use: No    Sexual activity: Not Currently   Social History Narrative    Disabled dt depression and low back pain, [place] [facility] Elia Arnold is close by and is her paid caregiver via waiver services    HD via [employer], [id number], [employer], [employer]. Son is in the home and has CP and is total care-and is also cared for by a waiver by [employer]. 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 Per daughter, her brother is blind, MRDD, CP multiple health issues. Daughter's  is patient's legal guardian. Patient has hemodialysis Tuesday Thursday and Saturday. Patient's bedroom is on main floor with a half bath. Daughter walks patient upstairs once weekly for full bath. Patient is using her walker in the home. Patient has a hospital bed in the home. Social Determinants of Health     Financial Resource Strain: Low Risk     Difficulty of Paying Living Expenses: Not hard at all   Food Insecurity: No Food Insecurity    Worried About [address] in the Last Year: Never true    [address] in the Last Year: Never true   Transportation Needs: No Transportation Needs    Lack of Transportation (Medical): No    Lack of Transportation (Non-Medical):  No   Physical Activity: Sufficiently Active    Days of Exercise per Week: 3 days    Minutes of Exercise per Session: 60 min       SCREENINGS @FLOW([id number])@      PHYSICAL EXAM    (up to 7 for level 4, 8 or more for level 5)     ED Triage Vitals [03/08/23 1942]   BP Temp Temp Source Heart Rate Resp SpO2 Height Weight   134/69 96.8 °F (36 °C) Temporal 88 14 92 % 5' 7\" (1.702 m) 191 lb (86.6 kg)       Physical Exam  Vitals and nursing note reviewed. Constitutional:       Appearance: She is well-developed. HENT:      Head: Normocephalic. Nose: Nose normal.   Eyes:      Conjunctiva/sclera: Conjunctivae normal.      Pupils: Pupils are equal, round, and reactive to light. Cardiovascular:      Rate and Rhythm: Normal rate and regular rhythm. Heart sounds: Normal heart sounds. Pulmonary:      Effort: Pulmonary effort is normal.      Breath sounds: Wheezing present. Abdominal:      General: Bowel sounds are normal.      Palpations: Abdomen is soft. Tenderness: There is no abdominal tenderness. There is no guarding. Musculoskeletal:         General: Normal range of motion. Cervical back: Normal range of motion and neck supple. Skin:     General: Skin is warm and dry. Capillary Refill: Capillary refill takes less than 2 seconds. Neurological:      General: No focal deficit present. Mental Status: She is alert and oriented to person, place, and time. Psychiatric:         Mood and Affect: Mood normal.       DIAGNOSTIC RESULTS     EKG: All EKG's are interpreted by the Emergency Department Physician who either signs or Co-signsthis chart in the absence of a cardiologist.    Shows sinus rhythm with first-degree AV block. In addition there is right bundle branch block pattern and left anterior fascicular block. No acute ischemic changes. Unchanged from prior EKG. Abnormal EKG. Overall rate 74.     RADIOLOGY:   Non-plain filmimages such as CT, Ultrasound and MRI are read by the radiologist. Plain radiographic images are visualized and preliminarily interpreted by the emergency physician with the below findings:  Next x-ray interpreted by me is negative. Interpretation per the Radiologist below, if available at the time ofthis note:    XR CHEST PORTABLE   Final Result   No acute process. ED BEDSIDE ULTRASOUND:   Performed by ED Physician - none    LABS:  Labs Reviewed   COMPREHENSIVE METABOLIC PANEL - Abnormal; Notable for the following components:       Result Value    Glucose 242 (*)     BUN 40 (*)     Creatinine 5.61 (*)     Est, Glom Filt Rate 7.9 (*)     Alkaline Phosphatase 154 (*)     All other components within normal limits    Narrative:     Susana Ni  [facility] tel. [phone],  TROP results called to and read back by DR Sonal Johnson, 03/08/2023 21:31, by [facility]   TROPONIN - Abnormal; Notable for the following components:    Troponin 0.036 (*)     All other components within normal limits    Narrative:     CALL  [place]  [facility] tel. [phone],  TROP results called to and read back by DR Sonal Johnson, 03/08/2023 21:31, by [facility]   CBC WITH AUTO DIFFERENTIAL - Abnormal; Notable for the following components:    RBC 3.62 (*)     Hemoglobin 10.8 (*)     Hematocrit 31.9 (*)     RDW 16.3 (*)     All other components within normal limits       All other labs were within normal range or not returned as of this dictation. EMERGENCY DEPARTMENT COURSE and DIFFERENTIAL DIAGNOSIS/MDM:   Vitals:    Vitals:    03/08/23 1942   BP: 134/69   Pulse: 88   Resp: 14   Temp: 96.8 °F (36 °C)   TempSrc: Temporal   SpO2: 92%   Weight: 191 lb (86.6 kg)   Height: 5' 7\" (1.702 m)          MDM  Found to have wheezing on exam.  Moved with breathing treatment. Put on prednisone for 5 days. History of COPD.   Chest x-ray is interpreted by me as negative      CONSULTS:  None    PROCEDURES:  Unless otherwise noted below, none     Procedures    FINAL IMPRESSION      1. COPD exacerbation [facility])          DISPOSITION/PLAN   DISPOSITION Decision To Discharge 03/08/2023 10:18:30 PM      PATIENT REFERRED TO:  MD [address]  [place] [id number]  [phone]    In 1 week      DISCHARGE MEDICATIONS:  New Prescriptions    PREDNISONE (DELTASONE) 20 MG TABLET    Take 2 tablets by mouth daily for 5 doses          (Please note that portions of this note were completed with a voice recognition program.  Efforts were made to edit the dictations but occasionally words are mis-transcribed.)    Masood Osborne MD (electronically signed)  Attending Emergency Physician         Masood Osborne MD  03/08/23 [id number]

## 2023-03-10 ENCOUNTER — OFFICE VISIT (OUTPATIENT)
Dept: CARDIOLOGY CLINIC | Age: 63
End: 2023-03-10
Payer: COMMERCIAL

## 2023-03-10 VITALS
BODY MASS INDEX: 31.45 KG/M2 | SYSTOLIC BLOOD PRESSURE: 110 MMHG | HEART RATE: 66 BPM | DIASTOLIC BLOOD PRESSURE: 58 MMHG | OXYGEN SATURATION: 96 % | WEIGHT: 200.8 LBS

## 2023-03-10 DIAGNOSIS — I10 HYPERTENSION, UNSPECIFIED TYPE: ICD-10-CM

## 2023-03-10 DIAGNOSIS — I25.119 CORONARY ARTERY DISEASE INVOLVING NATIVE CORONARY ARTERY OF NATIVE HEART WITH ANGINA PECTORIS (HCC): ICD-10-CM

## 2023-03-10 DIAGNOSIS — R26.2 DIFFICULTY IN WALKING: ICD-10-CM

## 2023-03-10 DIAGNOSIS — I95.89 HYPOTENSION DUE TO HYPOVOLEMIA: ICD-10-CM

## 2023-03-10 DIAGNOSIS — I25.119 CORONARY ARTERY DISEASE INVOLVING NATIVE HEART WITH ANGINA PECTORIS, UNSPECIFIED VESSEL OR LESION TYPE (HCC): ICD-10-CM

## 2023-03-10 DIAGNOSIS — E78.2 MIXED HYPERLIPIDEMIA: ICD-10-CM

## 2023-03-10 DIAGNOSIS — R09.89 BILATERAL CAROTID BRUITS: ICD-10-CM

## 2023-03-10 DIAGNOSIS — Z95.1 S/P SINGLE VESSEL CORONARY ARTERY BYPASS: ICD-10-CM

## 2023-03-10 DIAGNOSIS — I95.3 HEMODIALYSIS-ASSOCIATED HYPOTENSION: ICD-10-CM

## 2023-03-10 DIAGNOSIS — I10 ESSENTIAL (PRIMARY) HYPERTENSION: Primary | ICD-10-CM

## 2023-03-10 DIAGNOSIS — R06.09 DOE (DYSPNEA ON EXERTION): ICD-10-CM

## 2023-03-10 DIAGNOSIS — E86.1 HYPOTENSION DUE TO HYPOVOLEMIA: ICD-10-CM

## 2023-03-10 LAB
EKG ATRIAL RATE: 74 BPM
EKG P AXIS: 78 DEGREES
EKG P-R INTERVAL: 248 MS
EKG Q-T INTERVAL: 458 MS
EKG QRS DURATION: 122 MS
EKG QTC CALCULATION (BAZETT): 508 MS
EKG R AXIS: -53 DEGREES
EKG T AXIS: 71 DEGREES
EKG VENTRICULAR RATE: 74 BPM

## 2023-03-10 PROCEDURE — 1123F ACP DISCUSS/DSCN MKR DOCD: CPT | Performed by: INTERNAL MEDICINE

## 2023-03-10 PROCEDURE — 3074F SYST BP LT 130 MM HG: CPT | Performed by: INTERNAL MEDICINE

## 2023-03-10 PROCEDURE — 3078F DIAST BP <80 MM HG: CPT | Performed by: INTERNAL MEDICINE

## 2023-03-10 PROCEDURE — 1036F TOBACCO NON-USER: CPT | Performed by: INTERNAL MEDICINE

## 2023-03-10 PROCEDURE — 93010 ELECTROCARDIOGRAM REPORT: CPT | Performed by: INTERNAL MEDICINE

## 2023-03-10 PROCEDURE — G9898 SNP/LG TRM CRE PT W/POS CDE: HCPCS | Performed by: INTERNAL MEDICINE

## 2023-03-10 PROCEDURE — G8484 FLU IMMUNIZE NO ADMIN: HCPCS | Performed by: INTERNAL MEDICINE

## 2023-03-10 PROCEDURE — G9901 SNP/LG TRM CRE PT W/POS CDE: HCPCS | Performed by: INTERNAL MEDICINE

## 2023-03-10 PROCEDURE — 99214 OFFICE O/P EST MOD 30 MIN: CPT | Performed by: INTERNAL MEDICINE

## 2023-03-10 PROCEDURE — G8417 CALC BMI ABV UP PARAM F/U: HCPCS | Performed by: INTERNAL MEDICINE

## 2023-03-10 PROCEDURE — G8427 DOCREV CUR MEDS BY ELIG CLIN: HCPCS | Performed by: INTERNAL MEDICINE

## 2023-03-10 PROCEDURE — G8399 PT W/DXA RESULTS DOCUMENT: HCPCS | Performed by: INTERNAL MEDICINE

## 2023-03-10 PROCEDURE — 1111F DSCHRG MED/CURRENT MED MERGE: CPT | Performed by: INTERNAL MEDICINE

## 2023-03-10 PROCEDURE — 1090F PRES/ABSN URINE INCON ASSESS: CPT | Performed by: INTERNAL MEDICINE

## 2023-03-10 RX ORDER — FLUCONAZOLE 150 MG/1
150 TABLET ORAL ONCE
COMMUNITY

## 2023-03-10 RX ORDER — MIDODRINE HYDROCHLORIDE 5 MG/1
TABLET ORAL
Qty: 180 TABLET | Refills: 3 | Status: SHIPPED | OUTPATIENT
Start: 2023-03-10

## 2023-03-10 RX ORDER — ISOSORBIDE MONONITRATE 30 MG/1
TABLET, EXTENDED RELEASE ORAL
COMMUNITY
Start: 2023-02-10

## 2023-03-10 ASSESSMENT — ENCOUNTER SYMPTOMS
RESPIRATORY NEGATIVE: 1
COUGH: 0
BLOOD IN STOOL: 0
CHEST TIGHTNESS: 0
STRIDOR: 0
EYES NEGATIVE: 1
SHORTNESS OF BREATH: 0
NAUSEA: 0
GASTROINTESTINAL NEGATIVE: 1
WHEEZING: 0

## 2023-03-10 NOTE — PROGRESS NOTES
Subsequent Progress Note  Patient: Kenneth Pedroza  YOB: 1960  MRN: [id number]    Chief Complaint: fall CAD HTN HPL  Chief Complaint   Patient presents with    Follow-Up from [facility] [place]: 2/15-2/18, 2/23 and 3/4 for chest pain          CV Data:  6/2020 Echo EF 60  Mild mR  RVSP 46   7/2020 LAD DEWEY. She has prior stents as well.   8/21 Echo EF 60  8/2021 LAD recurrent ISR with double layer stents. Went to [facility] and had redo stent. 12/21 CUS B/l ICA 50-69  12/21 Spec tnegative  1/12/22 Cath LAD IST   1/2022 CABG LIMA - LAD + TV Repair complicated by Tamponade and pericardial window. 4/25/22 eCHO ef 60 NO pe rvsp 68  7/22 NOELLE normal LVEF no PFO no THrombus    HD T Th Sat  Subjective/HPI: TELEHEALTH EVALUATION -- Audio/Visual (During RPVPK-08 public health emergency)    Pt is at a nursing home now. No cp occ SOB no falls no bleed. Takes meds. Recently Plavix stopped and Brilinta added. Had recerrnet admissions for CP    10/28/2020 started HD( T Thur and Sat). Past 3 days gaine ~ 10 LBS according to her scale at home and HD. She is not short of breath and denies CP. She is here due to discrepancy in weight. 2/10/21 TELEHEALTH EVALUATION -- Audio/Visual (During ENUNE-78 public health emergency)    At [address] No further falls no cp some sob eats well takes meds. Will go home net week. Doing well w HD    9/10/21 after LAD stent at [facility] feels better no cp no sob no falls no bleed. 10/27/21 recent er for CP and fall. BP was low. Some parikh. No bleed. Takes meds. Often dizzy    3/24/22 had CABG + TV repairand Pericaridal window. Ws in hosp > 2 week. s weak and tired. starign to move some. 4/25/22 still with sternal discomfort with motion. No angina. No sob no falls no bleed. 8/16/22 recent [facility] for sepsis and HD catheter infection. Removed and received new one. Having  sharp noncardiac CP no sob able to walk some    9/28/22 doing well no cp no sob minimal walks on HD.  Will get AV fistula at [facility]    12/5/22 preop Left Knee. Pt has not wlaked in 1 month due to knee pain. No cp no sob no falls no bleed    3/10/23 1 month ago developed exertional chest pressure some parikh no falls no bleed BP low during HD. No bleed. EKG: SR 82 RBBB    LIFE LONG Nonsmoker  Lives with daughter      Past Medical History:   Diagnosis Date    Angina at rest [facility]) 5/24/2020    Anxiety     CAD S/P percutaneous coronary angioplasty 2015, 2018    stents per dr Arleen Garza    CHF (congestive heart failure) ([facility]     CKD (chronic kidney disease) stage 4, GFR 15-29 ml/min ([facility] 2/24/2018    CKD stage 4 due to type 2 diabetes mellitus ([facility]     Contusion of right chest wall 2/16/2021    COPD (chronic obstructive pulmonary disease) ([facility]     Diabetic nephropathy with proteinuria ([facility] 2014    DJD (degenerative joint disease) of knee     Dr Tong Valencia    GERD (gastroesophageal reflux disease)     Hemiparesis, left ([facility] 2013    entered Assisted Living ([facility])    Hemodialysis patient [facility])     Hemodialysis-associated hypotension 10/22/2021    History of heart failure     History of seizures     History of type C viral hepatitis     HTN (hypertension)     Hyperlipidemia     Impaired mobility and activities of daily living     Infection of venous access port 7/29/2022    Mediastinal lymphadenopathy 2013    Dr Alex Bishop    Metabolic syndrome     Moderate persistent asthma without complication 1/36/1868    Need for extended care facility 7/7/2021    Neurogenic urinary incontinence 2013    Neuropathy in diabetes ([facility]     Nonrheumatic mitral valve regurgitation 7/7/2021    Nonrheumatic tricuspid valve regurgitation 7/7/2021    Obesity (BMI 30-39. 9)     Recurrent UTI     S/P colonoscopy 2014    [facility], focal active colitis    Schizophrenia, paranoid, chronic ([address]     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 Juan Jose Leblanc      x1 Dr. Dinora Driver, Dr Juan M Marx  08/10/2021    [facility]    DIAGNOSTIC CARDIAC CATH LAB PROCEDURE  10/02/2019    DIALYSIS CATHETER INSERTION Left 2022    Tunneled Symetrex 15.5F x 23cm hemodialysis catheter inserted by Dr. Rohini Craig Left 2022    [id number] replamcent tunneld HD cath by Dr. Constanza Ibanez, TOTAL ABDOMINAL (CERVIX REMOVED)      one ovary intact, Dr Mary Lee, menorrhagia    IR THROMBECTOMY PERCUT AVF  2022    IR THROMBECTOMY PERCUT AVF 2022 [facility] PROCEDURE    IR TUNNELED CATHETER PLACEMENT GREATER THAN 5 YEARS  2022    IR TUNNELED CATHETER PLACEMENT GREATER THAN 5 YEARS 6/3/2022 [facility] PROCEDURE    IR TUNNELED CATHETER PLACEMENT GREATER THAN 5 YEARS Left 2022 Dr. Casimiro Medina exchanged to 15.5F x 28 cm.      15.5 fr by 23 cm Symetrex dialysis catheter inserted by Dr Casimiro Medina    IR TUNNELED Tamea Tomas 5 YEARS  2022    IR TUNNELED CATHETER PLACEMENT GREATER THAN 5 YEARS 2022 [facility] PROCEDURE    MA ARTHRP KNE CONDYLE&PLATU MEDIAL&LAT COMPARTMENTS Left 2018    LEFT KNEE TOTAL KNEE ARTHROPLASTY, SHAYNA, NERVE BLOCK [facility]     TOTAL KNEE ARTHROPLASTY  2016    Dr Fall Lung    TUNNELED [address] [place] [address] Right 07/01/2020    tunneled HD catheter per Dr Cain Hess       Family History   Problem Relation Age of Onset    Cancer Mother 76        survived    Breast Cancer Mother     Hypertension Father     Diabetes Sister     Mental Illness Sister     Colon Cancer Neg Hx        Social History     Socioeconomic History    Marital status:      Spouse name: None    Number of children: 2    Years of education: None    Highest education level: None   Occupational History    Occupation: disabled   Tobacco Use    Smoking status: Never    Smokeless tobacco: Never   Vaping Use    Vaping Use: Never used   Substance and Sexual Activity    Alcohol use: No     Alcohol/week: 0.0 standard drinks    Drug use: No    Sexual activity: Not Currently   Social History Narrative    Disabled dt depression and low back pain, lives in [facility] Matthew Cortés is close by and is her paid caregiver via waiver services    HD via [employer], [id number], PETÄJÄVESI, Sat. Son is in the home and has CP and is total care-and is also cared for by a waiver by Gomez Polk. Pt was born in [place], [place] [facility] [place] a twin sister Masood Leggett, very ill in 2018, [place] [address]    Moved to [facility], , 2 children, one son (disabled with CP) and one daughter Komal Shook at \[employer]\"", as a nurse's aide Disabled due to mental illness and back pain    Lived at [facility], was discharged, returned to independent living in 2017 in the daughter's house and has adjusted well    One son and one daughter, live in the same house with patient, Garry Gtz pays the rent    [employer] reading ([email or web address])             10/11/2021 [facility] updates; patient lives with her daughter son-in-law and 2 grandchildren and patient's handicapped son. Per daughter, her brother is blind, MRDD, CP multiple health issues. Daughter's  is patient's legal guardian. Patient has hemodialysis Tuesday Thursday and Saturday.   Patient's bedroom is on main floor with a half bath.  Daughter walks patient upstairs once weekly for full bath. Patient is using her walker in the home. Patient has a hospital bed in the home. Social Determinants of Health     Financial Resource Strain: Low Risk     Difficulty of Paying Living Expenses: Not hard at all   Food Insecurity: No Food Insecurity    Worried About [facility] in the Last Year: Never true    [facility] in the Last Year: Never true   Transportation Needs: No Transportation Needs    Lack of Transportation (Medical): No    Lack of Transportation (Non-Medical): No   Physical Activity: Sufficiently Active    Days of Exercise per Week: 3 days    Minutes of Exercise per Session: 60 min       Allergies   Allergen Reactions    Codeine Hives     hives    Oxycontin [Oxycodone Hcl] Hives       Current Outpatient Medications   Medication Sig Dispense Refill    isosorbide mononitrate (IMDUR) 30 MG extended release tablet TAKE 4 TABLETS BY MOUTH DAILY      fluconazole (DIFLUCAN) 150 MG tablet Take 150 mg by mouth once      midodrine (PROAMATINE) 5 MG tablet Take 1 tab po bid 180 tablet 3    predniSONE (DELTASONE) 20 MG tablet Take 2 tablets by mouth daily for 5 doses 10 tablet 0    metoprolol tartrate (LOPRESSOR) 25 MG tablet Take 0.5 tablets by mouth 2 times daily HOLD for SBP<100 or HR<60 60 tablet 3    melatonin 3 MG TABS tablet Take 10 mg by mouth nightly as needed      albuterol (PROVENTIL) 2.5 MG/0.5ML NEBU nebulizer solution Take 2.5 mg by nebulization every 4 hours as needed for Wheezing or Shortness of Breath      LACTOBACILLUS PO Take 2 capsules by mouth 2 times daily Indications: Nutritional Support      insulin lispro (HUMALOG) 100 UNIT/ML injection vial Inject 12 Units into the skin 3 times daily (before meals) Indications: Type 2 Diabetes      insulin lispro (HUMALOG) 100 UNIT/ML injection vial Inject 0-10 Units into the skin 3 times daily (before meals) Indications: Type 2 Diabetes Inject as per sliding scale:  If 151-200=2u; 201-250-4u; 251-300=6u; 301-350=8u; 351-400=10u. Call MD/NP if >400. insulin lispro (HUMALOG) 100 UNIT/ML injection vial Inject 0-4 Units into the skin nightly Indications: Type 2 Diabetes Inject as per sliding scale: If 201-250=1u; 251-300=2u; 301-350=3u; 351-400=4u. Call MD/NP if >400.      b complex-C-folic acid (NEPHROCAPS) 1 MG capsule Take 1 capsule by mouth daily Indications: Dialysis Dependent Chronic Kidney Failure      pantoprazole (PROTONIX) 20 MG tablet Take 20 mg by mouth daily Indications: Gastroesophageal Reflux Disease      nitroGLYCERIN (NITROSTAT) 0.4 MG SL tablet up to max of 3 total doses. If no relief after 1 dose, call 911. 25 tablet 3    hydrOXYzine HCl (ATARAX) 25 MG tablet Take 25 mg by mouth every 8 hours as needed for Itching      loperamide (IMODIUM A-D) 2 MG tablet Take 2 mg by mouth every 6 hours as needed for Diarrhea Indications: Diarrhea      SURE COMFORT PEN NEEDLES 30G X 8 MM MISC USE AS DIRECTED FIVE TIMES A DAILY 100 each 10    LANTUS SOLOSTAR 100 UNIT/ML injection pen INJECT 55 UNITS SUBCUTANEOUSLY AT BEDTIME (Patient taking differently: Inject 55 Units into the skin nightly Indications: Type 2 Diabetes INJECT 55 UNITS SUBCUTANEOUSLY AT BEDTIME) 15 mL 10    sertraline (ZOLOFT) 50 MG tablet Take 1 tablet by mouth nightly (Patient taking differently: Take 50 mg by mouth nightly Indications: Depression) 30 tablet 5    atorvastatin (LIPITOR) 40 MG tablet Take 1 tablet by mouth nightly (Patient taking differently: Take 40 mg by mouth nightly Indications: High Amount of Fats in the Blood) 30 tablet 5    Handicap Placard MISC by Does not apply route Expiration in 5 years.  1 each 0    blood glucose test strips (ONETOUCH VERIO) strip  each 3    OneTouch Delica Lancets 99E MISC  each 3    Blood Glucose Monitoring Suppl (ONETOUCH VERIO) w/Device KIT AS DIRECTED 1 kit 0    aspirin EC 81 MG EC tablet Take 1 tablet by mouth daily (Patient taking differently: Take 81 mg by mouth daily Indications: Thickening and Hardening of Heart Arteries) 30 tablet 12    blood glucose test strips (FREESTYLE LITE) strip 1 each by Does not apply route 4 times daily (before meals and nightly) As needed. 200 strip 5    Alcohol Swabs (EASY TOUCH ALCOHOL PREP MEDIUM) 70 % PADS USE AS DIRECTED THREE TIMES A  each 3    FreeStyle Lancets MISC Test 4x daily 150 each 3    acetaminophen (TYLENOL) 325 MG tablet Take 2 tablets by mouth every 4 hours as needed for Pain (For mild to moderate pain (Pain 1-6 out of 10 on pain scale)) 120 tablet 0    Blood Glucose Monitoring Suppl (FREESTYLE LITE) CORETTA 1 Device by Does not apply route daily as needed (Diabetes) Use freestyle meter to test blood sugar as needed 1 Device 0    ARIPiprazole (ABILIFY) 5 MG tablet TAKE 1 TABLET BY MOUTH ONCE DAILY (Patient taking differently: Take 5 mg by mouth daily Indications: Paranoid Schizophrenia TAKE 1 TABLET BY MOUTH ONCE DAILY) 30 tablet 10     No current facility-administered medications for this visit. Review of Systems:   Review of Systems   Constitutional: Negative. Negative for diaphoresis and fatigue. HENT: Negative. Eyes: Negative. Respiratory: Negative. Negative for cough, chest tightness, shortness of breath, wheezing and stridor. Cardiovascular: Negative. Negative for chest pain, palpitations and leg swelling. Gastrointestinal: Negative. Negative for blood in stool and nausea. Genitourinary: Negative. Musculoskeletal: Negative. Skin: Negative. Neurological: Negative. Negative for dizziness, syncope, weakness and light-headedness. Hematological: Negative. Psychiatric/Behavioral: Negative. Physical Examination:    BP (!) 110/58 (Site: Right Upper Arm, Position: Sitting, Cuff Size: Large Adult)   Pulse 66   Wt 200 lb 12.8 oz (91.1 kg)   LMP  (LMP Unknown)   SpO2 96%   BMI 31.45 kg/m²    Physical Exam   Constitutional: She appears healthy. No distress. HENT:   Normal cephalic and Atraumatic   Eyes: Pupils are equal, round, and reactive to light. Neck: Thyroid normal. No JVD present. No neck adenopathy. No thyromegaly present. Cardiovascular: Normal rate, regular rhythm, intact distal pulses and normal pulses. Murmur heard. Pulmonary/Chest: Effort normal and breath sounds normal. She has no wheezes. She has no rales. She exhibits no tenderness. Abdominal: Soft. Bowel sounds are normal. There is no abdominal tenderness. Musculoskeletal:         General: No tenderness or edema. Normal range of motion. Cervical back: Normal range of motion and neck supple. Neurological: She is alert and oriented to person, place, and time. Skin: Skin is warm. No cyanosis. Nails show no clubbing.      LABS:  CBC:   Lab Results   Component Value Date/Time    WBC 7.8 03/08/2023 08:45 PM    RBC 3.62 03/08/2023 08:45 PM    HGB 10.8 03/08/2023 08:45 PM    HCT 31.9 03/08/2023 08:45 PM    MCV 88.2 03/08/2023 08:45 PM    MCH 29.9 03/08/2023 08:45 PM    MCHC 33.9 03/08/2023 08:45 PM    RDW 16.3 03/08/2023 08:45 PM     03/08/2023 08:45 PM    MPV 10.0 03/05/2020 12:00 AM     Lipids:  Lab Results   Component Value Date    CHOL 70 04/09/2022    CHOL 123 01/13/2022    CHOL 101 06/11/2020     Lab Results   Component Value Date    TRIG 92 04/09/2022    TRIG 228 (H) 01/13/2022    TRIG 82 06/11/2020     Lab Results   Component Value Date    HDL 33 (L) 04/09/2022    HDL 39 (L) 01/13/2022    HDL 48 06/11/2020     Lab Results   Component Value Date    LDLCALC 19 04/09/2022    LDLCALC 38 01/13/2022    LDLCALC 37 06/11/2020     Lab Results   Component Value Date    LABVLDL 59.0 05/04/2013    VLDL 43 01/30/2017     Lab Results   Component Value Date    CHOLHDLRATIO 4.32 01/30/2017     CMP:    Lab Results   Component Value Date/Time     03/08/2023 08:45 PM    K 4.4 03/08/2023 08:45 PM    K 4.3 07/04/2022 03:07 AM    CL 97 03/08/2023 08:45 PM    CO2 30 03/08/2023 08:45 PM BUN 40 03/08/2023 08:45 PM    CREATININE 5.61 03/08/2023 08:45 PM    GFRAA 17.5 10/06/2022 11:15 PM    LABGLOM 7.9 03/08/2023 08:45 PM    GLUCOSE 242 03/08/2023 08:45 PM    GLUCOSE 90 10/24/2022 10:43 AM    PROT 7.0 03/08/2023 08:45 PM    LABALBU 3.6 03/08/2023 08:45 PM    CALCIUM 9.3 03/08/2023 08:45 PM    BILITOT 0.3 03/08/2023 08:45 PM    ALKPHOS 154 03/08/2023 08:45 PM    AST 28 03/08/2023 08:45 PM    ALT 22 03/08/2023 08:45 PM     BMP:    Lab Results   Component Value Date/Time     03/08/2023 08:45 PM    K 4.4 03/08/2023 08:45 PM    K 4.3 07/04/2022 03:07 AM    CL 97 03/08/2023 08:45 PM    CO2 30 03/08/2023 08:45 PM    BUN 40 03/08/2023 08:45 PM    LABALBU 3.6 03/08/2023 08:45 PM    CREATININE 5.61 03/08/2023 08:45 PM    CALCIUM 9.3 03/08/2023 08:45 PM    GFRAA 17.5 10/06/2022 11:15 PM    LABGLOM 7.9 03/08/2023 08:45 PM    GLUCOSE 242 03/08/2023 08:45 PM    GLUCOSE 90 10/24/2022 10:43 AM     Magnesium:    Lab Results   Component Value Date/Time    MG 1.9 03/04/2023 02:40 PM     TSH:  Lab Results   Component Value Date    TSH 0.502 02/23/2023       Patient Active Problem List   Diagnosis    Atherosclerotic heart disease of native coronary artery with unspecified angina pectoris (HCC)    Schizophrenia, paranoid, chronic    Metabolic syndrome    Vitamin B 12 deficiency    Cerebral microvascular disease    Mixed hyperlipidemia    Other hammer toe (acquired)    Vitamin D insufficiency    Incontinence of urine    Diabetic nephropathy with proteinuria (HCC)    Essential (primary) hypertension    History of type C viral hepatitis    Urinary incontinence due to cognitive impairment    History of seizures    Stented coronary artery-plan is to stay on Plavix indefinately per Dr Radha Keep    Inadequately controlled diabetes mellitus ([facility]    Controlled type 2 diabetes mellitus with diabetic neuropathy, with long-term current use of insulin (HCC)    Hemiparesis, left (HCC)    Angina, class II (HCC)    Pain, unspecified Tardive dyskinesia    Shortness of breath    Uncontrolled type 2 diabetes mellitus with hyperglycemia ([facility])    Chronic diastolic congestive heart failure ([facility])    Sleep apnea, unspecified    Pulmonary hypertension, unspecified ([facility])    Class 2 severe obesity with serious comorbidity and body mass index (BMI) of 36.0 to 36.9 in adult ([facility])    Edema    Closed supracondylar fracture of right humerus    Other chronic pain    Palliative care patient    Recurrent falls    Renal failure    Difficulty in walking    ESRD (end stage renal disease) on dialysis ([facility])    Weakness    Other seizures ([facility])    Moderate persistent asthma without complication    COVID-19    Post PTCA    Falls frequently    Chest wall contusion, left, initial encounter    Headache, unspecified    Paranoid schizophrenia ([facility])    Compression fracture of spine ([facility])    Closed rib fracture    Depression    Chronic obstructive pulmonary disease ([facility])    Critical illness polyneuropathy ([facility])    Multiple closed fractures of ribs of right side    Nonrheumatic mitral valve regurgitation    Nonrheumatic tricuspid valve regurgitation    Need for extended care facility    Chronic pain of both shoulders    Hemodialysis-associated hypotension    Anginal chest pain at rest ([facility])    Chest pain    Unstable angina ([facility])    NSTEMI (non-ST elevated myocardial infarction) ([facility])    CKD (chronic kidney disease) stage 4, GFR 15-29 ml/min ([facility])    Hyperkalemia    Impaired mobility and activities of daily living dt polyneuropathy and reccent fall     Dialysis patient ([facility])    Unspecified open wound of right upper arm, initial encounter    Multiple closed fractures of right lower extremity and ribs    Closed T11 fracture ([facility])    Encephalopathy acute    MRSA bacteremia    Sepsis due to Enterococcus ([facility])    Local infection due to central venous catheter    DJD (degenerative joint disease), cervical    Closed head injury    Sarcopenia    Fall from standing    Septicemia ([facility])     Chronic hepatitis C ([facility]    Catheter-related bloodstream infection    Enterococcus faecalis infection    Cervical stenosis of spinal canal    Ataxic gait    Lumbar stenosis with neurogenic claudication    Falls infrequently    Infection of venous access port       Medications Discontinued During This Encounter   Medication Reason    traMADol (ULTRAM) 50 MG tablet Therapy completed    midodrine (PROAMATINE) 5 MG tablet REORDER         Modified Medications    Modified Medication Previous Medication    MIDODRINE (PROAMATINE) 5 MG TABLET midodrine (PROAMATINE) 5 MG tablet       Take 1 tab po bid    Take 1 tablet by mouth one time a day every Tue, Thu, Sat for hypotension. Give 30 mins prior to dialysis. Orders Placed This Encounter   Medications    midodrine (PROAMATINE) 5 MG tablet     Sig: Take 1 tab po bid     Dispense:  180 tablet     Refill:  3         Assessment/Plan:    1. Coronary artery disease involving native heart with angina pectoris, unspecified vessel or lesion type ([facility]    2. CABG and TV repair with pericaridal window- repeat Echo. Will need Cardiac Rehab but not ready yet. Not ready for cardaic Rehab yet. Echo no further PE. --- stable no angina    2. Diastolic congestive heart failure, unspecified HF chronicity (HCC)  Stable. No edema. - continue meds. Low salt diet    3. Stented coronary artery    4. Shortness of breath   stable - no worse    5. Mixed hyperlipidemia  Statin - long term. Check labs. Low fat diet. 6. Essential hypertension BP low- stop Imdur 120 for now. 7. Coronary artery disease involving native coronary artery of native heart with angina pectoris ([facility]    8 dizzy/falls- CUS. - moderate B/L- will continue surveillance. 9. GONZALEZ _ SPECt     Counseling:  Heart Healthy Lifestyle, Low Salt Diet, Take Precautions to Prevent Falls and Walk Daily    Return in about 3 months (around 6/10/2023).       Electronically signed by Sarah Carter MD on 3/10/2023 at 2:55 PM

## 2023-03-14 ENCOUNTER — TELEPHONE (OUTPATIENT)
Dept: CARDIOLOGY CLINIC | Age: 63
End: 2023-03-14

## 2023-03-14 NOTE — TELEPHONE ENCOUNTER
Pt is having chest pain, states that left arm hurts a little bit but not too bad. Pt wants to know what Dr Lacho Cota would like her to do since every time she goes to the ER they send her home with no answers.      [phone]

## 2023-03-17 RX ORDER — INSULIN ASPART 100 [IU]/ML
INJECTION, SOLUTION INTRAVENOUS; SUBCUTANEOUS
Qty: 30 ML | Refills: 3 | Status: SHIPPED | OUTPATIENT
Start: 2023-03-17

## 2023-03-17 RX ORDER — INSULIN ASPART 100 [IU]/ML
INJECTION, SOLUTION INTRAVENOUS; SUBCUTANEOUS
Qty: 30 ML | Refills: 3 | Status: SHIPPED | OUTPATIENT
Start: 2023-03-17 | End: 2023-03-17 | Stop reason: SDUPTHER

## 2023-03-22 ENCOUNTER — TELEPHONE (OUTPATIENT)
Dept: ENDOCRINOLOGY | Age: 63
End: 2023-03-22

## 2023-03-22 NOTE — TELEPHONE ENCOUNTER
Received a PA for sure comfort needle-PA was done on covermymeds. Received a faxed PA was denied, left patient a message.

## 2023-03-22 NOTE — CARE COORDINATION
Albertina Adame, remains following the patient. Link Hooker, remains following. [facility] awaits the results of the PCR test.  [facility] was contacting [facility] regarding transportation for dialysis.  Electronically signed by Misael  on 2/23/21 at 12:27 PM EST MIRANDA Fofana for colonoscopy on 8/22/23  arrive at 115pm   . Mailed Prep instructions to Mailing address on-file. ----miralax

## 2023-03-27 ENCOUNTER — CLINICAL DOCUMENTATION ONLY (OUTPATIENT)
Facility: CLINIC | Age: 63
End: 2023-03-27

## 2023-03-31 ENCOUNTER — OFFICE VISIT (OUTPATIENT)
Dept: ENDOCRINOLOGY | Age: 63
End: 2023-03-31

## 2023-03-31 ENCOUNTER — HOSPITAL ENCOUNTER (EMERGENCY)
Age: 63
Discharge: HOME | End: 2023-03-31
Attending: EMERGENCY MEDICINE
Payer: MEDICARE

## 2023-03-31 VITALS
HEART RATE: 73 BPM | BODY MASS INDEX: 30.61 KG/M2 | RESPIRATION RATE: 18 BRPM | WEIGHT: 195 LBS | TEMPERATURE: 98.4 F | SYSTOLIC BLOOD PRESSURE: 101 MMHG | OXYGEN SATURATION: 98 % | HEIGHT: 67 IN | DIASTOLIC BLOOD PRESSURE: 57 MMHG

## 2023-03-31 VITALS
DIASTOLIC BLOOD PRESSURE: 66 MMHG | HEIGHT: 67 IN | WEIGHT: 195 LBS | SYSTOLIC BLOOD PRESSURE: 80 MMHG | BODY MASS INDEX: 30.61 KG/M2

## 2023-03-31 DIAGNOSIS — I95.89 HYPOTENSION DUE TO HYPOVOLEMIA: ICD-10-CM

## 2023-03-31 DIAGNOSIS — E11.65 UNCONTROLLED TYPE 2 DIABETES MELLITUS WITH HYPERGLYCEMIA (HCC): Primary | ICD-10-CM

## 2023-03-31 DIAGNOSIS — E86.1 HYPOTENSION DUE TO HYPOVOLEMIA: ICD-10-CM

## 2023-03-31 DIAGNOSIS — R19.7 DIARRHEA, UNSPECIFIED TYPE: Primary | ICD-10-CM

## 2023-03-31 LAB
ALBUMIN SERPL-MCNC: 3.9 G/DL (ref 3.5–4.6)
ALP SERPL-CCNC: 170 U/L (ref 40–130)
ALT SERPL-CCNC: 23 U/L (ref 0–33)
ANION GAP SERPL CALCULATED.3IONS-SCNC: 7 MEQ/L (ref 9–15)
AST SERPL-CCNC: 22 U/L (ref 0–35)
BASOPHILS # BLD: 0.1 K/UL (ref 0–0.2)
BASOPHILS NFR BLD: 0.6 %
BILIRUB SERPL-MCNC: 0.6 MG/DL (ref 0.2–0.7)
BUN SERPL-MCNC: 26 MG/DL (ref 8–23)
CALCIUM SERPL-MCNC: 9.7 MG/DL (ref 8.5–9.9)
CHLORIDE SERPL-SCNC: 99 MEQ/L (ref 95–107)
CHP ED QC CHECK: NORMAL
CO2 SERPL-SCNC: 32 MEQ/L (ref 20–31)
CREAT SERPL-MCNC: 3.93 MG/DL (ref 0.5–0.9)
EOSINOPHIL # BLD: 0.5 K/UL (ref 0–0.7)
EOSINOPHIL NFR BLD: 5.8 %
ERYTHROCYTE [DISTWIDTH] IN BLOOD BY AUTOMATED COUNT: 16.2 % (ref 11.5–14.5)
GLOBULIN SER CALC-MCNC: 3.4 G/DL (ref 2.3–3.5)
GLUCOSE BLD-MCNC: 284 MG/DL
GLUCOSE SERPL-MCNC: 272 MG/DL (ref 70–99)
HCT VFR BLD AUTO: 30.9 % (ref 37–47)
HGB BLD-MCNC: 10.6 G/DL (ref 12–16)
LACTATE BLDV-SCNC: 1.5 MMOL/L (ref 0.5–2.2)
LYMPHOCYTES # BLD: 1.4 K/UL (ref 1–4.8)
LYMPHOCYTES NFR BLD: 15.2 %
MCH RBC QN AUTO: 31 PG (ref 27–31.3)
MCHC RBC AUTO-ENTMCNC: 34.2 % (ref 33–37)
MCV RBC AUTO: 90.6 FL (ref 79.4–94.8)
MONOCYTES # BLD: 0.8 K/UL (ref 0.2–0.8)
MONOCYTES NFR BLD: 8.3 %
NEUTROPHILS # BLD: 6.6 K/UL (ref 1.4–6.5)
NEUTS SEG NFR BLD: 70.1 %
PLATELET # BLD AUTO: 202 K/UL (ref 130–400)
POTASSIUM SERPL-SCNC: 4.5 MEQ/L (ref 3.4–4.9)
PROT SERPL-MCNC: 7.3 G/DL (ref 6.3–8)
RBC # BLD AUTO: 3.41 M/UL (ref 4.2–5.4)
SODIUM SERPL-SCNC: 138 MEQ/L (ref 135–144)
WBC # BLD AUTO: 9.4 K/UL (ref 4.8–10.8)

## 2023-03-31 PROCEDURE — 96360 HYDRATION IV INFUSION INIT: CPT

## 2023-03-31 PROCEDURE — 99284 EMERGENCY DEPT VISIT MOD MDM: CPT

## 2023-03-31 PROCEDURE — 85025 COMPLETE CBC W/AUTO DIFF WBC: CPT

## 2023-03-31 PROCEDURE — 96361 HYDRATE IV INFUSION ADD-ON: CPT

## 2023-03-31 PROCEDURE — 83605 ASSAY OF LACTIC ACID: CPT

## 2023-03-31 PROCEDURE — 36415 COLL VENOUS BLD VENIPUNCTURE: CPT

## 2023-03-31 PROCEDURE — 80053 COMPREHEN METABOLIC PANEL: CPT

## 2023-03-31 PROCEDURE — 6370000000 HC RX 637 (ALT 250 FOR IP): Performed by: EMERGENCY MEDICINE

## 2023-03-31 PROCEDURE — 2580000003 HC RX 258: Performed by: EMERGENCY MEDICINE

## 2023-03-31 RX ORDER — ACETAMINOPHEN 325 MG/1
650 TABLET ORAL ONCE
Status: COMPLETED | OUTPATIENT
Start: 2023-03-31 | End: 2023-03-31

## 2023-03-31 RX ORDER — 0.9 % SODIUM CHLORIDE 0.9 %
500 INTRAVENOUS SOLUTION INTRAVENOUS ONCE
Status: COMPLETED | OUTPATIENT
Start: 2023-03-31 | End: 2023-03-31

## 2023-03-31 RX ADMIN — ACETAMINOPHEN 650 MG: 325 TABLET ORAL at 13:02

## 2023-03-31 RX ADMIN — SODIUM CHLORIDE 500 ML: 9 INJECTION, SOLUTION INTRAVENOUS at 12:31

## 2023-03-31 ASSESSMENT — PAIN - FUNCTIONAL ASSESSMENT: PAIN_FUNCTIONAL_ASSESSMENT: NONE - DENIES PAIN

## 2023-03-31 ASSESSMENT — ENCOUNTER SYMPTOMS
CHEST TIGHTNESS: 0
DIARRHEA: 1
VOMITING: 0
DIARRHEA: 1
NAUSEA: 0
SHORTNESS OF BREATH: 0
EYE PAIN: 0
SORE THROAT: 0
ABDOMINAL PAIN: 0

## 2023-03-31 ASSESSMENT — PAIN DESCRIPTION - FREQUENCY: FREQUENCY: CONTINUOUS

## 2023-03-31 ASSESSMENT — PAIN SCALES - GENERAL: PAINLEVEL_OUTOF10: 0

## 2023-03-31 ASSESSMENT — PAIN DESCRIPTION - PAIN TYPE: TYPE: ACUTE PAIN

## 2023-03-31 NOTE — PROGRESS NOTES
name: Not on file    Number of children: 2    Years of education: Not on file    Highest education level: Not on file   Occupational History    Occupation: disabled   Tobacco Use    Smoking status: Never    Smokeless tobacco: Never   Vaping Use    Vaping Use: Never used   Substance and Sexual Activity    Alcohol use: No     Alcohol/week: 0.0 standard drinks    Drug use: No    Sexual activity: Not Currently   Other Topics Concern    Not on file   Social History Narrative    Disabled dt depression and low back pain, lives in [place] [facility] Fredis is close by and is her paid caregiver via waiver services    HD via [employer], LJEWT-FQ-REIDGNÈIT, PETÄJÄVESI, Sat. Son is in the home and has CP and is total care-and is also cared for by a waiver by Sesar Lazar. Pt was born in [place], one [place] a twin sister Vickey Overton, very ill in 2018, [place] [address]    Moved to [facility], , 2 children, one son (disabled with CP) and one daughter Romain Malhotra at [facility], as a nurse's aide Disabled due to mental illness and back pain    Lived at [facility], was discharged, returned to independent living in 2017 in the daughter's house and has adjusted well    One son and one daughter, live in the same house with patient, Teofilo Soni pays the rent    Meet My Friends reading ([facility])             10/11/2021 [facility] updates; patient lives with her daughter son-in-law and 2 grandchildren and patient's handicapped son. Per daughter, her brother is blind, MRDD, CP multiple health issues. Daughter's  is patient's legal guardian. Patient has hemodialysis Tuesday Thursday and Saturday. Patient's bedroom is on main floor with a half bath. Daughter walks patient upstairs once weekly for full bath. Patient is using her walker in the home. Patient has a hospital bed in the home.      Social Determinants of Health     Financial Resource Strain: Low Risk     Difficulty of Paying Living Expenses: Not hard at all

## 2023-03-31 NOTE — ED TRIAGE NOTES
Pt has co general illness dehydration. Pt sent from Dr Jv Hampton office for hypotension. Pt is aox4 pale warm and dry.

## 2023-03-31 NOTE — ED NOTES
Pt states had BM in brief and assisted to clean up at this time  Unable to collect stool at this time  Pt offered restroom and states not able to go at this time  Pt repositioned       Eulogio Laura RN  03/31/23 9607 [facility]  03/31/23 [id number]

## 2023-03-31 NOTE — ED PROVIDER NOTES
2015, 2018    stents per dr Morelia Anderson    CHF (congestive heart failure) ([address]     CKD (chronic kidney disease) stage 4, GFR 15-29 ml/min ([address] 2/24/2018    CKD stage 4 due to type 2 diabetes mellitus ([address]     Contusion of right chest wall 2/16/2021    COPD (chronic obstructive pulmonary disease) ([address]     Diabetic nephropathy with proteinuria ([address] 2014    DJD (degenerative joint disease) of knee     Dr Luisa Koch    GERD (gastroesophageal reflux disease)     Hemiparesis, left ([address] 2013    entered Assisted Living ([facility])    Hemodialysis patient [facility])     Hemodialysis-associated hypotension 10/22/2021    History of heart failure     History of seizures     History of type C viral hepatitis     HTN (hypertension)     Hyperlipidemia     Impaired mobility and activities of daily living     Infection of venous access port 7/29/2022    Mediastinal lymphadenopathy 2013    Dr Tessa Cervantes    Metabolic syndrome     Moderate persistent asthma without complication 6/42/3171    Need for extended care facility 7/7/2021    Neurogenic urinary incontinence 2013    Neuropathy in diabetes ([address]     Nonrheumatic mitral valve regurgitation 7/7/2021    Nonrheumatic tricuspid valve regurgitation 7/7/2021    Obesity (BMI 30-39. 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 ;No signs of potential drug abuse or diversion identified. ;Assessed functional status. ;Obtaining appropriate analgesic effect of treatment. Chronic Pain > 50 MEDD Re-evaluated the status of the patient's underlying condition causing pain. ;Considered consultation with a specialist.;Obtained or confirmed \"Consent for Opioid Use\" on file.        (Please note that portions of this note were completed with a voice recognition program.  Efforts were made to edit the dictations but occasionally words are mis-transcribed.)    Isabel Gomez DO (electronically signed)  Attending Emergency Physician            Isabel Gomez DO  04/06/23 8614

## 2023-04-03 ENCOUNTER — OFFICE VISIT (OUTPATIENT)
Dept: GASTROENTEROLOGY | Age: 63
End: 2023-04-03
Payer: MEDICARE

## 2023-04-03 VITALS
HEIGHT: 67 IN | OXYGEN SATURATION: 98 % | SYSTOLIC BLOOD PRESSURE: 122 MMHG | DIASTOLIC BLOOD PRESSURE: 64 MMHG | HEART RATE: 62 BPM | WEIGHT: 199 LBS | BODY MASS INDEX: 31.23 KG/M2

## 2023-04-03 DIAGNOSIS — R19.7 DIARRHEA, UNSPECIFIED TYPE: Primary | ICD-10-CM

## 2023-04-03 DIAGNOSIS — R74.8 ELEVATED ALKALINE PHOSPHATASE LEVEL: ICD-10-CM

## 2023-04-03 DIAGNOSIS — D64.9 ANEMIA, UNSPECIFIED TYPE: ICD-10-CM

## 2023-04-03 PROCEDURE — 3078F DIAST BP <80 MM HG: CPT | Performed by: NURSE PRACTITIONER

## 2023-04-03 PROCEDURE — 3074F SYST BP LT 130 MM HG: CPT | Performed by: NURSE PRACTITIONER

## 2023-04-03 PROCEDURE — 99213 OFFICE O/P EST LOW 20 MIN: CPT | Performed by: NURSE PRACTITIONER

## 2023-04-03 PROCEDURE — 1090F PRES/ABSN URINE INCON ASSESS: CPT | Performed by: NURSE PRACTITIONER

## 2023-04-03 PROCEDURE — 1123F ACP DISCUSS/DSCN MKR DOCD: CPT | Performed by: NURSE PRACTITIONER

## 2023-04-03 PROCEDURE — G8417 CALC BMI ABV UP PARAM F/U: HCPCS | Performed by: NURSE PRACTITIONER

## 2023-04-03 PROCEDURE — G9898 SNP/LG TRM CRE PT W/POS CDE: HCPCS | Performed by: NURSE PRACTITIONER

## 2023-04-03 PROCEDURE — G8427 DOCREV CUR MEDS BY ELIG CLIN: HCPCS | Performed by: NURSE PRACTITIONER

## 2023-04-03 PROCEDURE — G8399 PT W/DXA RESULTS DOCUMENT: HCPCS | Performed by: NURSE PRACTITIONER

## 2023-04-03 PROCEDURE — G9901 SNP/LG TRM CRE PT W/POS CDE: HCPCS | Performed by: NURSE PRACTITIONER

## 2023-04-03 PROCEDURE — 1036F TOBACCO NON-USER: CPT | Performed by: NURSE PRACTITIONER

## 2023-04-03 RX ORDER — ALUMINUM ZIRCONIUM OCTACHLOROHYDREX GLY 16 G/100G
GEL TOPICAL
Qty: 425 G | Refills: 2 | Status: SHIPPED | OUTPATIENT
Start: 2023-04-03

## 2023-04-03 RX ORDER — LOPERAMIDE HYDROCHLORIDE 2 MG/1
2 TABLET ORAL EVERY 6 HOURS PRN
Qty: 120 TABLET | Refills: 3 | Status: SHIPPED | OUTPATIENT
Start: 2023-04-03 | End: 2023-08-01

## 2023-04-05 RX ORDER — LANCETS 28 GAUGE
EACH MISCELLANEOUS
Qty: 150 EACH | Refills: 3 | Status: SHIPPED | OUTPATIENT
Start: 2023-04-05

## 2023-04-05 RX ORDER — ISOPROPYL ALCOHOL 70 ML/100ML
SWAB TOPICAL
Qty: 100 EACH | Refills: 3 | Status: SHIPPED | OUTPATIENT
Start: 2023-04-05

## 2023-04-05 RX ORDER — BLOOD-GLUCOSE METER
EACH MISCELLANEOUS
Qty: 1 KIT | Refills: 0 | Status: SHIPPED | OUTPATIENT
Start: 2023-04-05

## 2023-04-05 NOTE — TELEPHONE ENCOUNTER
Patient requesting medication refill.  Please approve or deny this request.    Rx requested:  Requested Prescriptions     Pending Prescriptions Disp Refills    Blood Glucose Monitoring Suppl (ONETOUCH VERIO) w/Device KIT 1 kit 0     Sig: AS DIRECTED    Alcohol Swabs (EASY TOUCH ALCOHOL PREP MEDIUM) 70 % PADS 100 each 3     Sig: USE AS DIRECTED THREE TIMES A DAY    FreeStyle Lancets MISC 150 each 3     Sig: Test 4x daily         Last Office Visit:   3/31/2023      Next Visit Date:  Future Appointments   Date Time Provider Aminata Cortes   4/10/2023  9:00 AM [place] ULTRASOUND 2 [facility] ULTRA [facility]OL[facility] Fac RAD   4/10/2023  9:30 AM [place] NUC MED INJECTION ROOM 2 [facility] NUC MED MOLZ Fac RAD   4/10/2023 10:30 AM [place] NUCLEAR MEDICINE ROOM 2 [facility] NUC MED [facility]OL[facility] Fac RAD   4/10/2023 11:00 AM [facility] STRESS ROOM 1 MLOZ STRESS MOLZ Fac RAD   4/10/2023 12:30 PM [place] NUCLEAR MEDICINE ROOM 2 [facility]LO[facility] NUC MED MOLZ Fac RAD   5/1/2023  2:15 PM Kiarra Thomas MD [address]   5/15/2023  3:45 PM Neil Severs,  [address]   6/7/2023  1:45 PM Khushbu Harrington  [facility]

## 2023-04-07 DIAGNOSIS — D64.9 ANEMIA, UNSPECIFIED TYPE: ICD-10-CM

## 2023-04-07 DIAGNOSIS — R74.8 ELEVATED ALKALINE PHOSPHATASE LEVEL: ICD-10-CM

## 2023-04-07 DIAGNOSIS — R19.7 DIARRHEA, UNSPECIFIED TYPE: ICD-10-CM

## 2023-04-07 LAB
REASON FOR REJECTION: NORMAL
REJECTED TEST: NORMAL

## 2023-04-07 RX ORDER — BLOOD-GLUCOSE METER
KIT MISCELLANEOUS
Qty: 1 KIT | Refills: 0 | Status: SHIPPED | OUTPATIENT
Start: 2023-04-07

## 2023-04-08 LAB
FERRITIN: 1475 NG/ML (ref 13–150)
FOLATE: >20 NG/ML
IRON SATURATION: 21 % (ref 20–55)
IRON: 46 UG/DL (ref 37–145)
TOTAL IRON BINDING CAPACITY: 217 UG/DL (ref 250–450)
UNSATURATED IRON BINDING CAPACITY: 171 UG/DL (ref 112–347)
VITAMIN B-12: 908 PG/ML (ref 232–1245)

## 2023-04-09 LAB — PH STL: 6 [PH] (ref 5–8.5)

## 2023-04-09 ASSESSMENT — ENCOUNTER SYMPTOMS: SHORTNESS OF BREATH: 0

## 2023-04-10 LAB
REASON FOR REJECTION: NORMAL
REJECTED TEST: NORMAL

## 2023-04-12 LAB
ALP BONE SERPL-CCNC: 113 U/L (ref 0–55)
ALP ISOS SERPL HS-CCNC: 0 U/L
ALP LIVER SERPL-CCNC: 79 U/L (ref 0–94)
ALP SERPL-CCNC: 192 U/L (ref 40–120)
CALPROTECTIN STL-MCNT: 14 UG/G

## 2023-04-17 LAB
ACTIVATED PARTIAL THROMBOPLASTIN TIME IN PPP BY COAGULATION ASSAY: 32 SEC (ref 26–39)
ANION GAP IN SER/PLAS: 16 MMOL/L (ref 10–20)
CALCIUM (MG/DL) IN SER/PLAS: 10 MG/DL (ref 8.6–10.3)
CARBON DIOXIDE, TOTAL (MMOL/L) IN SER/PLAS: 28 MMOL/L (ref 21–32)
CHLORIDE (MMOL/L) IN SER/PLAS: 98 MMOL/L (ref 98–107)
CREATININE (MG/DL) IN SER/PLAS: 5.11 MG/DL (ref 0.5–1.05)
ERYTHROCYTE DISTRIBUTION WIDTH (RATIO) BY AUTOMATED COUNT: 15.1 % (ref 11.5–14.5)
ERYTHROCYTE MEAN CORPUSCULAR HEMOGLOBIN CONCENTRATION (G/DL) BY AUTOMATED: 32.7 G/DL (ref 32–36)
ERYTHROCYTE MEAN CORPUSCULAR VOLUME (FL) BY AUTOMATED COUNT: 92 FL (ref 80–100)
ERYTHROCYTES (10*6/UL) IN BLOOD BY AUTOMATED COUNT: 3.71 X10E12/L (ref 4–5.2)
GFR FEMALE: 9 ML/MIN/1.73M2
GLUCOSE (MG/DL) IN SER/PLAS: 319 MG/DL (ref 74–99)
HEMATOCRIT (%) IN BLOOD BY AUTOMATED COUNT: 34.3 % (ref 36–46)
HEMOGLOBIN (G/DL) IN BLOOD: 11.2 G/DL (ref 12–16)
INR IN PPP BY COAGULATION ASSAY: 1.1 (ref 0.9–1.1)
LEUKOCYTES (10*3/UL) IN BLOOD BY AUTOMATED COUNT: 9.5 X10E9/L (ref 4.4–11.3)
PLATELETS (10*3/UL) IN BLOOD AUTOMATED COUNT: 250 X10E9/L (ref 150–450)
POTASSIUM (MMOL/L) IN SER/PLAS: 4.4 MMOL/L (ref 3.5–5.3)
PROTHROMBIN TIME (PT) IN PPP BY COAGULATION ASSAY: 12.2 SEC (ref 9.8–13.4)
SODIUM (MMOL/L) IN SER/PLAS: 138 MMOL/L (ref 136–145)
UREA NITROGEN (MG/DL) IN SER/PLAS: 37 MG/DL (ref 6–23)

## 2023-04-24 ENCOUNTER — HOSPITAL ENCOUNTER (OUTPATIENT)
Dept: DATA CONVERSION | Facility: HOSPITAL | Age: 63
End: 2023-04-24
Attending: SURGERY | Admitting: SURGERY
Payer: MEDICARE

## 2023-04-24 DIAGNOSIS — E11.22 TYPE 2 DIABETES MELLITUS WITH DIABETIC CHRONIC KIDNEY DISEASE (MULTI): ICD-10-CM

## 2023-04-24 DIAGNOSIS — Z86.73 PERSONAL HISTORY OF TRANSIENT ISCHEMIC ATTACK (TIA), AND CEREBRAL INFARCTION WITHOUT RESIDUAL DEFICITS: ICD-10-CM

## 2023-04-24 DIAGNOSIS — F17.200 NICOTINE DEPENDENCE, UNSPECIFIED, UNCOMPLICATED: ICD-10-CM

## 2023-04-24 DIAGNOSIS — I12.0 HYPERTENSIVE CHRONIC KIDNEY DISEASE WITH STAGE 5 CHRONIC KIDNEY DISEASE OR END STAGE RENAL DISEASE (MULTI): ICD-10-CM

## 2023-04-24 DIAGNOSIS — I25.10 ATHEROSCLEROTIC HEART DISEASE OF NATIVE CORONARY ARTERY WITHOUT ANGINA PECTORIS: ICD-10-CM

## 2023-04-24 DIAGNOSIS — Z79.02 LONG TERM (CURRENT) USE OF ANTITHROMBOTICS/ANTIPLATELETS: ICD-10-CM

## 2023-04-24 DIAGNOSIS — Z79.82 LONG TERM (CURRENT) USE OF ASPIRIN: ICD-10-CM

## 2023-04-24 DIAGNOSIS — E78.5 HYPERLIPIDEMIA, UNSPECIFIED: ICD-10-CM

## 2023-04-24 DIAGNOSIS — N18.6 END STAGE RENAL DISEASE (MULTI): ICD-10-CM

## 2023-04-24 DIAGNOSIS — Z79.4 LONG TERM (CURRENT) USE OF INSULIN (MULTI): ICD-10-CM

## 2023-04-24 DIAGNOSIS — J44.9 CHRONIC OBSTRUCTIVE PULMONARY DISEASE, UNSPECIFIED (MULTI): ICD-10-CM

## 2023-04-24 LAB
ANION GAP IN SER/PLAS: 13 MMOL/L (ref 10–20)
ATRIAL RATE: 66 BPM
CALCIUM (MG/DL) IN SER/PLAS: 9.8 MG/DL (ref 8.6–10.3)
CARBON DIOXIDE, TOTAL (MMOL/L) IN SER/PLAS: 30 MMOL/L (ref 21–32)
CHLORIDE (MMOL/L) IN SER/PLAS: 93 MMOL/L (ref 98–107)
CREATININE (MG/DL) IN SER/PLAS: 4.99 MG/DL (ref 0.5–1.05)
GFR FEMALE: 9 ML/MIN/1.73M2
GLUCOSE (MG/DL) IN SER/PLAS: 347 MG/DL (ref 74–99)
P AXIS: 59 DEGREES
P OFFSET: 114 MS
P ONSET: 58 MS
POCT GLUCOSE: 235 MG/DL (ref 74–99)
POCT GLUCOSE: 356 MG/DL (ref 74–99)
POTASSIUM (MMOL/L) IN SER/PLAS: 4.1 MMOL/L (ref 3.5–5.3)
PR INTERVAL: 266 MS
Q ONSET: 191 MS
QRS COUNT: 11 BEATS
QRS DURATION: 130 MS
QT INTERVAL: 482 MS
QTC CALCULATION(BAZETT): 505 MS
QTC FREDERICIA: 498 MS
R AXIS: -56 DEGREES
SODIUM (MMOL/L) IN SER/PLAS: 132 MMOL/L (ref 136–145)
T AXIS: 62 DEGREES
T OFFSET: 432 MS
UREA NITROGEN (MG/DL) IN SER/PLAS: 41 MG/DL (ref 6–23)
VENTRICULAR RATE: 66 BPM

## 2023-04-25 ENCOUNTER — PREP FOR PROCEDURE (OUTPATIENT)
Dept: CARDIOLOGY CLINIC | Age: 63
End: 2023-04-25

## 2023-04-25 ENCOUNTER — OFFICE VISIT (OUTPATIENT)
Dept: CARDIOLOGY CLINIC | Age: 63
End: 2023-04-25
Payer: MEDICARE

## 2023-04-25 VITALS — HEART RATE: 67 BPM | SYSTOLIC BLOOD PRESSURE: 102 MMHG | DIASTOLIC BLOOD PRESSURE: 54 MMHG | OXYGEN SATURATION: 96 %

## 2023-04-25 DIAGNOSIS — R26.2 DIFFICULTY IN WALKING: ICD-10-CM

## 2023-04-25 DIAGNOSIS — I95.89 HYPOTENSION DUE TO HYPOVOLEMIA: ICD-10-CM

## 2023-04-25 DIAGNOSIS — R09.89 BILATERAL CAROTID BRUITS: ICD-10-CM

## 2023-04-25 DIAGNOSIS — I25.119 CORONARY ARTERY DISEASE INVOLVING NATIVE HEART WITH ANGINA PECTORIS, UNSPECIFIED VESSEL OR LESION TYPE (HCC): ICD-10-CM

## 2023-04-25 DIAGNOSIS — I95.3 HEMODIALYSIS-ASSOCIATED HYPOTENSION: ICD-10-CM

## 2023-04-25 DIAGNOSIS — R07.9 CHEST PAIN, UNSPECIFIED TYPE: ICD-10-CM

## 2023-04-25 DIAGNOSIS — I10 HYPERTENSION, UNSPECIFIED TYPE: ICD-10-CM

## 2023-04-25 DIAGNOSIS — R06.09 DOE (DYSPNEA ON EXERTION): Primary | ICD-10-CM

## 2023-04-25 DIAGNOSIS — I25.119 CORONARY ARTERY DISEASE INVOLVING NATIVE CORONARY ARTERY OF NATIVE HEART WITH ANGINA PECTORIS (HCC): ICD-10-CM

## 2023-04-25 DIAGNOSIS — I10 ESSENTIAL (PRIMARY) HYPERTENSION: ICD-10-CM

## 2023-04-25 DIAGNOSIS — E86.1 HYPOTENSION DUE TO HYPOVOLEMIA: ICD-10-CM

## 2023-04-25 DIAGNOSIS — Z95.1 S/P SINGLE VESSEL CORONARY ARTERY BYPASS: ICD-10-CM

## 2023-04-25 PROCEDURE — 1123F ACP DISCUSS/DSCN MKR DOCD: CPT | Performed by: INTERNAL MEDICINE

## 2023-04-25 PROCEDURE — 99215 OFFICE O/P EST HI 40 MIN: CPT | Performed by: INTERNAL MEDICINE

## 2023-04-25 PROCEDURE — 1036F TOBACCO NON-USER: CPT | Performed by: INTERNAL MEDICINE

## 2023-04-25 PROCEDURE — 3074F SYST BP LT 130 MM HG: CPT | Performed by: INTERNAL MEDICINE

## 2023-04-25 PROCEDURE — 3078F DIAST BP <80 MM HG: CPT | Performed by: INTERNAL MEDICINE

## 2023-04-25 PROCEDURE — G9898 SNP/LG TRM CRE PT W/POS CDE: HCPCS | Performed by: INTERNAL MEDICINE

## 2023-04-25 PROCEDURE — G8399 PT W/DXA RESULTS DOCUMENT: HCPCS | Performed by: INTERNAL MEDICINE

## 2023-04-25 PROCEDURE — G8427 DOCREV CUR MEDS BY ELIG CLIN: HCPCS | Performed by: INTERNAL MEDICINE

## 2023-04-25 PROCEDURE — G8417 CALC BMI ABV UP PARAM F/U: HCPCS | Performed by: INTERNAL MEDICINE

## 2023-04-25 PROCEDURE — G9901 SNP/LG TRM CRE PT W/POS CDE: HCPCS | Performed by: INTERNAL MEDICINE

## 2023-04-25 PROCEDURE — 1090F PRES/ABSN URINE INCON ASSESS: CPT | Performed by: INTERNAL MEDICINE

## 2023-04-25 RX ORDER — NITROGLYCERIN 0.4 MG/1
0.4 TABLET SUBLINGUAL EVERY 5 MIN PRN
Status: CANCELLED | OUTPATIENT
Start: 2023-04-25

## 2023-04-25 RX ORDER — SODIUM CHLORIDE 0.9 % (FLUSH) 0.9 %
5-40 SYRINGE (ML) INJECTION EVERY 12 HOURS SCHEDULED
Status: CANCELLED | OUTPATIENT
Start: 2023-04-25

## 2023-04-25 RX ORDER — SODIUM CHLORIDE 450 MG/100ML
75 INJECTION, SOLUTION INTRAVENOUS CONTINUOUS
Status: CANCELLED | OUTPATIENT
Start: 2023-04-25

## 2023-04-25 RX ORDER — SODIUM CHLORIDE 9 MG/ML
INJECTION, SOLUTION INTRAVENOUS PRN
Status: CANCELLED | OUTPATIENT
Start: 2023-04-25

## 2023-04-25 RX ORDER — ONDANSETRON 2 MG/ML
4 INJECTION INTRAMUSCULAR; INTRAVENOUS EVERY 6 HOURS PRN
Status: CANCELLED | OUTPATIENT
Start: 2023-04-25

## 2023-04-25 RX ORDER — DIPHENHYDRAMINE HYDROCHLORIDE 50 MG/ML
50 INJECTION INTRAMUSCULAR; INTRAVENOUS ONCE
Status: CANCELLED | OUTPATIENT
Start: 2023-04-25 | End: 2023-04-25

## 2023-04-25 RX ORDER — SODIUM CHLORIDE 0.9 % (FLUSH) 0.9 %
5-40 SYRINGE (ML) INJECTION PRN
Status: CANCELLED | OUTPATIENT
Start: 2023-04-25

## 2023-04-25 ASSESSMENT — ENCOUNTER SYMPTOMS
EYES NEGATIVE: 1
GASTROINTESTINAL NEGATIVE: 1
BLOOD IN STOOL: 0
WHEEZING: 0
COUGH: 0
CHEST TIGHTNESS: 0
NAUSEA: 0
STRIDOR: 0
SHORTNESS OF BREATH: 0
RESPIRATORY NEGATIVE: 1

## 2023-04-25 NOTE — PROGRESS NOTES
times daily HOLD for SBP<100 or HR<60 60 tablet 3    melatonin 3 MG TABS tablet Take 10 mg by mouth nightly as needed      albuterol (PROVENTIL) 2.5 MG/0.5ML NEBU nebulizer solution Take 0.5 mLs by nebulization every 4 hours as needed for Wheezing or Shortness of Breath      LACTOBACILLUS PO Take 2 capsules by mouth in the morning and 2 capsules in the evening. Indications: Nutritional Support. insulin lispro (HUMALOG) 100 UNIT/ML injection vial Inject 12 Units into the skin 3 times daily (before meals) Indications: Type 2 Diabetes      insulin lispro (HUMALOG) 100 UNIT/ML injection vial Inject 0-10 Units into the skin 3 times daily (before meals) Indications: Type 2 Diabetes Inject as per sliding scale: If 151-200=2u; 201-250-4u; 251-300=6u; 301-350=8u; 351-400=10u. Call MD/NP if >400. insulin lispro (HUMALOG) 100 UNIT/ML injection vial Inject 0-4 Units into the skin nightly Indications: Type 2 Diabetes Inject as per sliding scale: If 201-250=1u; 251-300=2u; 301-350=3u; 351-400=4u. Call MD/NP if >400.      b complex-C-folic acid (NEPHROCAPS) 1 MG capsule Take 1 capsule by mouth daily Indications: Dialysis Dependent Chronic Kidney Failure      pantoprazole (PROTONIX) 20 MG tablet Take 1 tablet by mouth daily Indications: Gastroesophageal Reflux Disease      nitroGLYCERIN (NITROSTAT) 0.4 MG SL tablet up to max of 3 total doses.  If no relief after 1 dose, call 911. 25 tablet 3    hydrOXYzine HCl (ATARAX) 25 MG tablet Take 1 tablet by mouth every 8 hours as needed for Itching      SURE COMFORT PEN NEEDLES 30G X 8 MM MISC USE AS DIRECTED FIVE TIMES A DAILY 100 each 10    LANTUS SOLOSTAR 100 UNIT/ML injection pen INJECT 55 UNITS SUBCUTANEOUSLY AT BEDTIME (Patient taking differently: Inject 55 Units into the skin nightly Indications: Type 2 Diabetes INJECT 55 UNITS SUBCUTANEOUSLY AT BEDTIME) 15 mL 10    sertraline (ZOLOFT) 50 MG tablet Take 1 tablet by mouth nightly (Patient taking differently: Take 1 tablet by

## 2023-05-01 ENCOUNTER — OFFICE VISIT (OUTPATIENT)
Dept: GASTROENTEROLOGY | Age: 63
End: 2023-05-01
Payer: MEDICARE

## 2023-05-01 VITALS
HEART RATE: 77 BPM | OXYGEN SATURATION: 92 % | WEIGHT: 206 LBS | BODY MASS INDEX: 32.33 KG/M2 | HEIGHT: 67 IN | DIASTOLIC BLOOD PRESSURE: 64 MMHG | SYSTOLIC BLOOD PRESSURE: 116 MMHG

## 2023-05-01 DIAGNOSIS — D64.9 ANEMIA, UNSPECIFIED TYPE: ICD-10-CM

## 2023-05-01 DIAGNOSIS — R19.7 DIARRHEA, UNSPECIFIED TYPE: Primary | ICD-10-CM

## 2023-05-01 PROCEDURE — G8399 PT W/DXA RESULTS DOCUMENT: HCPCS | Performed by: INTERNAL MEDICINE

## 2023-05-01 PROCEDURE — G9898 SNP/LG TRM CRE PT W/POS CDE: HCPCS | Performed by: INTERNAL MEDICINE

## 2023-05-01 PROCEDURE — G8427 DOCREV CUR MEDS BY ELIG CLIN: HCPCS | Performed by: INTERNAL MEDICINE

## 2023-05-01 PROCEDURE — G8417 CALC BMI ABV UP PARAM F/U: HCPCS | Performed by: INTERNAL MEDICINE

## 2023-05-01 PROCEDURE — 99214 OFFICE O/P EST MOD 30 MIN: CPT | Performed by: INTERNAL MEDICINE

## 2023-05-01 PROCEDURE — 3078F DIAST BP <80 MM HG: CPT | Performed by: INTERNAL MEDICINE

## 2023-05-01 PROCEDURE — 3074F SYST BP LT 130 MM HG: CPT | Performed by: INTERNAL MEDICINE

## 2023-05-01 PROCEDURE — 1036F TOBACCO NON-USER: CPT | Performed by: INTERNAL MEDICINE

## 2023-05-01 PROCEDURE — 1090F PRES/ABSN URINE INCON ASSESS: CPT | Performed by: INTERNAL MEDICINE

## 2023-05-01 PROCEDURE — G9901 SNP/LG TRM CRE PT W/POS CDE: HCPCS | Performed by: INTERNAL MEDICINE

## 2023-05-01 PROCEDURE — 1123F ACP DISCUSS/DSCN MKR DOCD: CPT | Performed by: INTERNAL MEDICINE

## 2023-05-01 RX ORDER — LACTOBACILLUS RHAMNOSUS GG 10B CELL
1 CAPSULE ORAL DAILY
Qty: 30 CAPSULE | Refills: 1 | Status: SHIPPED | OUTPATIENT
Start: 2023-05-01

## 2023-05-01 RX ORDER — HYDROCODONE BITARTRATE AND ACETAMINOPHEN 5; 325 MG/1; MG/1
1 TABLET ORAL 4 TIMES DAILY
COMMUNITY
Start: 2023-04-24

## 2023-05-01 NOTE — PROGRESS NOTES
Gastroenterology Clinic Follow up Visit    Anderson Lee  [id number]  Chief Complaint   Patient presents with    Follow-up     Review labs      HPI: 70 y.o. female following up after last GI clinic on 4/3/2023. Interval change: Persistent symptoms of loose/liquid bowel movement every 2 to 3 days. Denies any blood in the stool. Weight stable. HPI last visit on 4/3/2023 summarized below:  Patient reports longstanding history of loose stools. Previously seen in GI clinic May 2022 with similar symptoms, was doing well taking 2 spoonfuls of Metamucil daily along with Imodium 2-4 times per day. States she currently is only taking Imodium, 2 tablets, twice daily. States she is not currently taking any fiber supplementation. She reports heartburn on occasion, only taking Protonix as needed. She denies nausea/vomiting, hematemesis, dysphagia, abdominal pain, hematochezia, or melena. Reports green-colored stools. She reports losing approximately 5 pounds in the last week. Upon review of medical record, weight appears stable in the 190s. S/p ER visit on 3/31/2023. She was sent to the ER from endocrinology office, found to be hypotensive, complaints of diarrhea and dizziness for 3 days. BP was stable during ER visit, she received 500 cc IV fluid bolus  Patient denies chest pain, shortness of breath or palpitations. Smoking status: denies  Alcohol use: denies  Illicit drug use: denies  NSAID use: denies     HPI from last GI clinic visit on 5/25/2022  summarized below:  Patient reports significant improvement in her diarrheal symptoms with taking 2 spoonfuls of Metamucil daily along with Imodium approximately 2-4 times per day. She reports having formed/brown bowel movements approximately 2 times per day. States she still will have occasional diarrhea around twice per week depending on if she eats too much fruit or drinks coffee.   She reports her nausea has improved as well, states it is significantly

## 2023-05-12 ENCOUNTER — HOSPITAL ENCOUNTER (OUTPATIENT)
Dept: CARDIAC CATH/INVASIVE PROCEDURES | Age: 63
Discharge: HOME | End: 2023-05-12
Attending: INTERNAL MEDICINE | Admitting: INTERNAL MEDICINE
Payer: MEDICARE

## 2023-05-12 VITALS
WEIGHT: 206 LBS | TEMPERATURE: 97.2 F | DIASTOLIC BLOOD PRESSURE: 76 MMHG | SYSTOLIC BLOOD PRESSURE: 137 MMHG | RESPIRATION RATE: 8 BRPM | OXYGEN SATURATION: 100 % | HEART RATE: 66 BPM | BODY MASS INDEX: 32.26 KG/M2

## 2023-05-12 LAB
ANION GAP SERPL CALCULATED.3IONS-SCNC: 13 MEQ/L (ref 9–15)
BUN SERPL-MCNC: 22 MG/DL (ref 8–23)
CALCIUM SERPL-MCNC: 9.5 MG/DL (ref 8.5–9.9)
CHLORIDE SERPL-SCNC: 96 MEQ/L (ref 95–107)
CO2 SERPL-SCNC: 28 MEQ/L (ref 20–31)
CREAT SERPL-MCNC: 3.46 MG/DL (ref 0.5–0.9)
ERYTHROCYTE [DISTWIDTH] IN BLOOD BY AUTOMATED COUNT: 15.4 % (ref 11.5–14.5)
GLUCOSE SERPL-MCNC: 306 MG/DL (ref 70–99)
HCT VFR BLD AUTO: 33.6 % (ref 37–47)
HGB BLD-MCNC: 11.6 G/DL (ref 12–16)
MCH RBC QN AUTO: 31 PG (ref 27–31.3)
MCHC RBC AUTO-ENTMCNC: 34.4 % (ref 33–37)
MCV RBC AUTO: 89.9 FL (ref 79.4–94.8)
PLATELET # BLD AUTO: 184 K/UL (ref 130–400)
POTASSIUM SERPL-SCNC: 3.9 MEQ/L (ref 3.4–4.9)
RBC # BLD AUTO: 3.73 M/UL (ref 4.2–5.4)
SODIUM SERPL-SCNC: 137 MEQ/L (ref 135–144)
WBC # BLD AUTO: 8.9 K/UL (ref 4.8–10.8)

## 2023-05-12 PROCEDURE — 6360000004 HC RX CONTRAST MEDICATION: Performed by: INTERNAL MEDICINE

## 2023-05-12 PROCEDURE — 2709999900 HC NON-CHARGEABLE SUPPLY

## 2023-05-12 PROCEDURE — 85027 COMPLETE CBC AUTOMATED: CPT

## 2023-05-12 PROCEDURE — 80048 BASIC METABOLIC PNL TOTAL CA: CPT

## 2023-05-12 PROCEDURE — 6360000002 HC RX W HCPCS

## 2023-05-12 PROCEDURE — 99152 MOD SED SAME PHYS/QHP 5/>YRS: CPT | Performed by: INTERNAL MEDICINE

## 2023-05-12 PROCEDURE — 93459 L HRT ART/GRFT ANGIO: CPT | Performed by: INTERNAL MEDICINE

## 2023-05-12 PROCEDURE — 93459 L HRT ART/GRFT ANGIO: CPT

## 2023-05-12 PROCEDURE — C1769 GUIDE WIRE: HCPCS

## 2023-05-12 PROCEDURE — C1894 INTRO/SHEATH, NON-LASER: HCPCS

## 2023-05-12 PROCEDURE — 2500000003 HC RX 250 WO HCPCS

## 2023-05-12 PROCEDURE — 6370000000 HC RX 637 (ALT 250 FOR IP)

## 2023-05-12 RX ORDER — SODIUM CHLORIDE 9 MG/ML
INJECTION, SOLUTION INTRAVENOUS PRN
Status: DISCONTINUED | OUTPATIENT
Start: 2023-05-12 | End: 2023-05-12 | Stop reason: HOSPADM

## 2023-05-12 RX ORDER — ACETAMINOPHEN 325 MG/1
650 TABLET ORAL EVERY 4 HOURS PRN
Status: DISCONTINUED | OUTPATIENT
Start: 2023-05-12 | End: 2023-05-12 | Stop reason: HOSPADM

## 2023-05-12 RX ORDER — NITROGLYCERIN 0.4 MG/1
0.4 TABLET SUBLINGUAL EVERY 5 MIN PRN
Status: DISCONTINUED | OUTPATIENT
Start: 2023-05-12 | End: 2023-05-12 | Stop reason: HOSPADM

## 2023-05-12 RX ORDER — SODIUM CHLORIDE 450 MG/100ML
INJECTION, SOLUTION INTRAVENOUS CONTINUOUS
Status: DISCONTINUED | OUTPATIENT
Start: 2023-05-12 | End: 2023-05-12 | Stop reason: HOSPADM

## 2023-05-12 RX ORDER — ONDANSETRON 2 MG/ML
4 INJECTION INTRAMUSCULAR; INTRAVENOUS EVERY 6 HOURS PRN
Status: DISCONTINUED | OUTPATIENT
Start: 2023-05-12 | End: 2023-05-12 | Stop reason: HOSPADM

## 2023-05-12 RX ORDER — SODIUM CHLORIDE 0.9 % (FLUSH) 0.9 %
5-40 SYRINGE (ML) INJECTION PRN
Status: DISCONTINUED | OUTPATIENT
Start: 2023-05-12 | End: 2023-05-12 | Stop reason: HOSPADM

## 2023-05-12 RX ORDER — ASPIRIN 81 MG/1
81 TABLET, CHEWABLE ORAL ONCE
Status: DISCONTINUED | OUTPATIENT
Start: 2023-05-12 | End: 2023-05-12 | Stop reason: HOSPADM

## 2023-05-12 RX ORDER — SODIUM CHLORIDE 9 MG/ML
INJECTION, SOLUTION INTRAVENOUS
Status: DISCONTINUED
Start: 2023-05-12 | End: 2023-05-12 | Stop reason: HOSPADM

## 2023-05-12 RX ORDER — DIPHENHYDRAMINE HYDROCHLORIDE 50 MG/ML
50 INJECTION INTRAMUSCULAR; INTRAVENOUS ONCE
Status: DISCONTINUED | OUTPATIENT
Start: 2023-05-12 | End: 2023-05-12 | Stop reason: HOSPADM

## 2023-05-12 RX ORDER — SODIUM CHLORIDE 450 MG/100ML
75 INJECTION, SOLUTION INTRAVENOUS CONTINUOUS
Status: DISCONTINUED | OUTPATIENT
Start: 2023-05-12 | End: 2023-05-12 | Stop reason: HOSPADM

## 2023-05-12 RX ORDER — SODIUM CHLORIDE 0.9 % (FLUSH) 0.9 %
5-40 SYRINGE (ML) INJECTION EVERY 12 HOURS SCHEDULED
Status: DISCONTINUED | OUTPATIENT
Start: 2023-05-12 | End: 2023-05-12 | Stop reason: HOSPADM

## 2023-05-12 RX ADMIN — IOPAMIDOL 50 ML: 612 INJECTION, SOLUTION INTRAVENOUS at 08:58

## 2023-05-12 NOTE — BRIEF OP NOTE
Brief Postoperative Note      Patient: Jayleen Penny  YOB: 1960  MRN: [id number]     Section of Cardiology  Adult Brief Cardiac Cath Procedure Note        Procedure(s):  LHC, b/l coronary angio via RFA    Pre-operative Diagnosis:  CP    H&P Status: Completed and reviewed. Post-operative Diagnosis:  LM normal.  LAD occluded at Proximal Stent. Large Diag patent w LI. CX LI. RCA small Patent. LIMA - Mid LAD widely patent. LVEDP 12-13.   No LV gram due to GFR    Findings:  See full report    Complications:  none    Primary Proceduralist:   Samantha Carranza MD

## 2023-05-12 NOTE — PROGRESS NOTES
Arrived to pre/post from the cath lab. Right groin is stable with no bleeding or hematoma. Attached to monitor and vitals are stable. Resting comfortably. No complaints of chest pain or shortness of breath.   Will continue to monitor

## 2023-05-12 NOTE — PROGRESS NOTES
Discharge instructions given and patient verbalizes understanding.   Discharged to care of family via [facility]

## 2023-05-15 ENCOUNTER — OFFICE VISIT (OUTPATIENT)
Dept: ENDOCRINOLOGY | Age: 63
End: 2023-05-15
Payer: MEDICARE

## 2023-05-15 VITALS
WEIGHT: 207 LBS | HEART RATE: 80 BPM | DIASTOLIC BLOOD PRESSURE: 66 MMHG | SYSTOLIC BLOOD PRESSURE: 123 MMHG | OXYGEN SATURATION: 91 % | HEIGHT: 67 IN | BODY MASS INDEX: 32.49 KG/M2

## 2023-05-15 DIAGNOSIS — E11.65 UNCONTROLLED TYPE 2 DIABETES MELLITUS WITH HYPERGLYCEMIA (HCC): Primary | ICD-10-CM

## 2023-05-15 LAB
CHP ED QC CHECK: ABNORMAL
GLUCOSE BLD-MCNC: 380 MG/DL

## 2023-05-15 PROCEDURE — 3074F SYST BP LT 130 MM HG: CPT | Performed by: INTERNAL MEDICINE

## 2023-05-15 PROCEDURE — G8427 DOCREV CUR MEDS BY ELIG CLIN: HCPCS | Performed by: INTERNAL MEDICINE

## 2023-05-15 PROCEDURE — 82962 GLUCOSE BLOOD TEST: CPT | Performed by: INTERNAL MEDICINE

## 2023-05-15 PROCEDURE — 99213 OFFICE O/P EST LOW 20 MIN: CPT | Performed by: INTERNAL MEDICINE

## 2023-05-15 PROCEDURE — 2022F DILAT RTA XM EVC RTNOPTHY: CPT | Performed by: INTERNAL MEDICINE

## 2023-05-15 PROCEDURE — G9901 SNP/LG TRM CRE PT W/POS CDE: HCPCS | Performed by: INTERNAL MEDICINE

## 2023-05-15 PROCEDURE — G9898 SNP/LG TRM CRE PT W/POS CDE: HCPCS | Performed by: INTERNAL MEDICINE

## 2023-05-15 PROCEDURE — 1090F PRES/ABSN URINE INCON ASSESS: CPT | Performed by: INTERNAL MEDICINE

## 2023-05-15 PROCEDURE — G8399 PT W/DXA RESULTS DOCUMENT: HCPCS | Performed by: INTERNAL MEDICINE

## 2023-05-15 PROCEDURE — 3078F DIAST BP <80 MM HG: CPT | Performed by: INTERNAL MEDICINE

## 2023-05-15 PROCEDURE — G8417 CALC BMI ABV UP PARAM F/U: HCPCS | Performed by: INTERNAL MEDICINE

## 2023-05-15 PROCEDURE — 3051F HG A1C>EQUAL 7.0%<8.0%: CPT | Performed by: INTERNAL MEDICINE

## 2023-05-15 PROCEDURE — 1123F ACP DISCUSS/DSCN MKR DOCD: CPT | Performed by: INTERNAL MEDICINE

## 2023-05-15 PROCEDURE — 1036F TOBACCO NON-USER: CPT | Performed by: INTERNAL MEDICINE

## 2023-05-15 RX ORDER — INSULIN ASPART 100 [IU]/ML
INJECTION, SOLUTION INTRAVENOUS; SUBCUTANEOUS
Qty: 30 ML | Refills: 3 | Status: SHIPPED | OUTPATIENT
Start: 2023-05-15

## 2023-05-15 RX ORDER — INSULIN GLARGINE 100 [IU]/ML
INJECTION, SOLUTION SUBCUTANEOUS
Qty: 15 ADJUSTABLE DOSE PRE-FILLED PEN SYRINGE | Refills: 3 | Status: SHIPPED | OUTPATIENT
Start: 2023-05-15

## 2023-05-15 NOTE — PROGRESS NOTES
5/15/2023    Assessment:       Diagnosis Orders   1. Uncontrolled type 2 diabetes mellitus with hyperglycemia (HCC)  POCT Glucose            PLAN:     Orders Placed This Encounter   Procedures    Basic Metabolic Panel     Standing Status:   Future     Standing Expiration Date:   5/15/2024    Hemoglobin A1C     Standing Status:   Future     Standing Expiration Date:   5/15/2024    POCT Glucose     Orders Placed This Encounter   Medications    NOVOLOG FLEXPEN 100 UNIT/ML injection pen     Sig: INJECT 16 UNITS WITH EACH MEAL. Dispense:  30 mL     Refill:  3    insulin glargine (LANTUS SOLOSTAR) 100 UNIT/ML injection pen     Si units at bedtime     Dispense:  15 Adjustable Dose Pre-filled Pen Syringe     Refill:  3     Continue current dose of insulin stable insulin pump therapy for improved control A1c goal of 7 or lower    Orders Placed This Encounter   Procedures    POCT Glucose     No orders of the defined types were placed in this encounter. No follow-ups on file. Subjective:     Chief Complaint   Patient presents with    Diabetes     Vitals:    05/15/23 1513   BP: 123/66   Site: Left Upper Arm   Position: Sitting   Cuff Size: Large Adult   Pulse: 80   SpO2: 91%   Weight: 207 lb (93.9 kg)   Height: 5' 7\" (1.702 m)     Wt Readings from Last 3 Encounters:   05/15/23 207 lb (93.9 kg)   23 206 lb (93.4 kg)   23 206 lb (93.4 kg)     BP Readings from Last 3 Encounters:   05/15/23 123/66   23 137/76   23 116/64     Follow-up on type 2 diabetes with multiple complications including heart disease and CABG since her dialysis glucose was 300+ today Lantus and NovoLog    Diabetes  She presents for her follow-up diabetic visit. She has type 2 diabetes mellitus. Her disease course has been fluctuating. Associated symptoms include fatigue. Pertinent negatives for diabetes include no polyuria and no weight loss. Diabetic complications include heart disease and nephropathy.  Current diabetic

## 2023-05-17 ASSESSMENT — ENCOUNTER SYMPTOMS: EYES NEGATIVE: 1

## 2023-06-14 ENCOUNTER — TELEPHONE (OUTPATIENT)
Dept: GASTROENTEROLOGY | Age: 63
End: 2023-06-14

## 2023-06-27 ENCOUNTER — HOSPITAL ENCOUNTER (OUTPATIENT)
Dept: DATA CONVERSION | Facility: HOSPITAL | Age: 63
End: 2023-06-27
Attending: SURGERY | Admitting: SURGERY
Payer: MEDICARE

## 2023-06-27 DIAGNOSIS — N18.6 END STAGE RENAL DISEASE (MULTI): ICD-10-CM

## 2023-06-27 DIAGNOSIS — T82.858A STENOSIS OF OTHER VASCULAR PROSTHETIC DEVICES, IMPLANTS AND GRAFTS, INITIAL ENCOUNTER (CMS-HCC): ICD-10-CM

## 2023-06-27 DIAGNOSIS — Z79.82 LONG TERM (CURRENT) USE OF ASPIRIN: ICD-10-CM

## 2023-06-27 DIAGNOSIS — E11.22 TYPE 2 DIABETES MELLITUS WITH DIABETIC CHRONIC KIDNEY DISEASE (MULTI): ICD-10-CM

## 2023-06-27 DIAGNOSIS — Z99.2 DEPENDENCE ON RENAL DIALYSIS (CMS-HCC): ICD-10-CM

## 2023-06-27 DIAGNOSIS — I12.0 HYPERTENSIVE CHRONIC KIDNEY DISEASE WITH STAGE 5 CHRONIC KIDNEY DISEASE OR END STAGE RENAL DISEASE (MULTI): ICD-10-CM

## 2023-06-27 DIAGNOSIS — Z79.4 LONG TERM (CURRENT) USE OF INSULIN (MULTI): ICD-10-CM

## 2023-06-27 DIAGNOSIS — I25.10 ATHEROSCLEROTIC HEART DISEASE OF NATIVE CORONARY ARTERY WITHOUT ANGINA PECTORIS: ICD-10-CM

## 2023-06-27 LAB
ACTIVATED PARTIAL THROMBOPLASTIN TIME IN PPP BY COAGULATION ASSAY: 32 SEC (ref 27–38)
ANION GAP IN SER/PLAS: 17 MMOL/L (ref 10–20)
CALCIUM (MG/DL) IN SER/PLAS: 9.6 MG/DL (ref 8.6–10.3)
CARBON DIOXIDE, TOTAL (MMOL/L) IN SER/PLAS: 29 MMOL/L (ref 21–32)
CHLORIDE (MMOL/L) IN SER/PLAS: 91 MMOL/L (ref 98–107)
CREATININE (MG/DL) IN SER/PLAS: 4.88 MG/DL (ref 0.5–1.05)
ERYTHROCYTE DISTRIBUTION WIDTH (RATIO) BY AUTOMATED COUNT: 14.7 % (ref 11.5–14.5)
ERYTHROCYTE MEAN CORPUSCULAR HEMOGLOBIN CONCENTRATION (G/DL) BY AUTOMATED: 33.6 G/DL (ref 32–36)
ERYTHROCYTE MEAN CORPUSCULAR VOLUME (FL) BY AUTOMATED COUNT: 91 FL (ref 80–100)
ERYTHROCYTES (10*6/UL) IN BLOOD BY AUTOMATED COUNT: 4.04 X10E12/L (ref 4–5.2)
GFR FEMALE: 9 ML/MIN/1.73M2
GLUCOSE (MG/DL) IN SER/PLAS: 204 MG/DL (ref 74–99)
HEMATOCRIT (%) IN BLOOD BY AUTOMATED COUNT: 36.6 % (ref 36–46)
HEMOGLOBIN (G/DL) IN BLOOD: 12.3 G/DL (ref 12–16)
INR IN PPP BY COAGULATION ASSAY: 1.1 (ref 0.9–1.1)
LEUKOCYTES (10*3/UL) IN BLOOD BY AUTOMATED COUNT: 10.1 X10E9/L (ref 4.4–11.3)
PLATELETS (10*3/UL) IN BLOOD AUTOMATED COUNT: 180 X10E9/L (ref 150–450)
POTASSIUM (MMOL/L) IN SER/PLAS: 4.8 MMOL/L (ref 3.5–5.3)
PROTHROMBIN TIME (PT) IN PPP BY COAGULATION ASSAY: 12.1 SEC (ref 9.8–12.8)
SODIUM (MMOL/L) IN SER/PLAS: 132 MMOL/L (ref 136–145)
UREA NITROGEN (MG/DL) IN SER/PLAS: 20 MG/DL (ref 6–23)

## 2023-07-03 ENCOUNTER — TELEPHONE (OUTPATIENT)
Dept: GASTROENTEROLOGY | Age: 63
End: 2023-07-03

## 2023-07-03 RX ORDER — LOPERAMIDE HYDROCHLORIDE 2 MG/1
2 CAPSULE ORAL 4 TIMES DAILY PRN
Qty: 60 CAPSULE | Refills: 2 | Status: SHIPPED | OUTPATIENT
Start: 2023-07-03 | End: 2023-08-17

## 2023-07-03 NOTE — TELEPHONE ENCOUNTER
Pt called and r/s todays appointment due to lack of transportation.  She is requesting a refill of imodium to be sent to [facility]

## 2023-07-10 NOTE — TELEPHONE ENCOUNTER
Patient requesting medication refill.  Please approve or deny this request.    Rx requested:  Requested Prescriptions     Pending Prescriptions Disp Refills    Continuous Blood Gluc Sensor (FREESTYLE KAIT 2 SENSOR) MISC 2 each 3     Sig: Replace every 14 days E11.65    Continuous Blood Gluc  (FREESTYLE KAIT 2 READER) CORETTA 1 each 1     Sig: Give 1          Last Office Visit:   5/15/2023      Next Visit Date:  Future Appointments   Date Time Provider [address]   8/21/2023  2:45 PM Andrei Álvarez MD [address] [place] [address]   9/11/2023  1:30 PM Brandon Soares  [facility]   9/18/2023  1:00 PM Raisa Graves MD [facility]

## 2023-07-12 DIAGNOSIS — E11.65 UNCONTROLLED TYPE 2 DIABETES MELLITUS WITH HYPERGLYCEMIA (HCC): ICD-10-CM

## 2023-07-12 LAB
ANION GAP SERPL CALCULATED.3IONS-SCNC: 17 MEQ/L (ref 9–15)
BUN SERPL-MCNC: 30 MG/DL (ref 8–23)
CALCIUM SERPL-MCNC: 9.5 MG/DL (ref 8.5–9.9)
CHLORIDE SERPL-SCNC: 92 MEQ/L (ref 95–107)
CO2 SERPL-SCNC: 27 MEQ/L (ref 20–31)
CREAT SERPL-MCNC: 5.15 MG/DL (ref 0.5–0.9)
GLUCOSE SERPL-MCNC: 86 MG/DL (ref 70–99)
HBA1C MFR BLD: 7.8 % (ref 4.8–5.9)
POTASSIUM SERPL-SCNC: 4.1 MEQ/L (ref 3.4–4.9)
SODIUM SERPL-SCNC: 136 MEQ/L (ref 135–144)

## 2023-07-13 RX ORDER — MIDODRINE HYDROCHLORIDE 10 MG/1
TABLET ORAL
Qty: 12 TABLET | Refills: 10 | Status: SHIPPED | OUTPATIENT
Start: 2023-07-13

## 2023-07-13 RX ORDER — ISOSORBIDE MONONITRATE 30 MG/1
TABLET, EXTENDED RELEASE ORAL
Qty: 120 TABLET | Refills: 10 | Status: SHIPPED | OUTPATIENT
Start: 2023-07-13

## 2023-07-13 NOTE — TELEPHONE ENCOUNTER
Requesting medication refill. Please approve or deny this request.    Rx requested:  Requested Prescriptions     Pending Prescriptions Disp Refills    midodrine (PROAMATINE) 10 MG tablet [Pharmacy Med Name: MIDODRINE 10MG TABS 10 Tablet] 12 tablet 10     Sig: TAKE 1 TABLET BY MOUTH ON DAYS OF DIALYSIS TREATMENT THREE TIMES WEEKLY    isosorbide mononitrate (IMDUR) 30 MG extended release tablet [Pharmacy Med Name: ISOSORBIDE MN ER 30MG *MONO 30 Tablet] 120 tablet 10     Sig: TAKE 4 TABLETS BY MOUTH DAILY         Last Office Visit:   6/16/2023      Next Visit Date:  Future Appointments   Date Time Provider [address]  [facility]   8/21/2023  2:45 PM Andrei Banda MD [facility]   9/11/2023  1:30 PM Torri Ham  [facility]   9/18/2023  1:00 PM Lisa Valverde MD [facility]               Last refill 08/29/2022. Please approve or deny.

## 2023-07-14 NOTE — TELEPHONE ENCOUNTER
Patient requesting medication refill. Please approve or deny this request.    Rx requested:  Requested Prescriptions     Pending Prescriptions Disp Refills    blood glucose test strips (ASCENSIA AUTODISC VI;ONE TOUCH ULTRA TEST VI) strip 100 each 3     Si each by In Vitro route daily As needed.          Last Office Visit:   5/15/2023      Next Visit Date:  Future Appointments   Date Time Provider [address]   2023  2:45 PM Andrei Franco MD [facility]   2023  1:30 PM Tristan Valiente  [facility]   2023  1:00 PM Renee Cleary MD [facility]

## 2023-07-26 RX ORDER — BLOOD-GLUCOSE METER
KIT MISCELLANEOUS
Qty: 100 EACH | Refills: 3 | Status: SHIPPED | OUTPATIENT
Start: 2023-07-26

## 2023-07-26 NOTE — TELEPHONE ENCOUNTER
Patient requesting medication refill.  Please approve or deny this request.    Rx requested:  Requested Prescriptions     Pending Prescriptions Disp Refills    blood glucose test strips (FREESTYLE LITE) strip 100 each 3     Sig: Use three time daily to test BS E11.65         Last Office Visit:   5/15/2023      Next Visit Date:  Future Appointments   Date Time Provider [facility] [address]   8/21/2023  2:45 PM Andrei Allen MD [address] [place] [address]   8/23/2023  1:30 PM Una Alejo MD [facility]   9/11/2023  1:30 PM Jayden Hoffmann  [facility]   9/18/2023  1:00 PM Zak Cuba MD [facility]

## 2023-07-31 ENCOUNTER — HOSPITAL ENCOUNTER (OUTPATIENT)
Dept: DATA CONVERSION | Facility: HOSPITAL | Age: 63
End: 2023-07-31
Attending: SURGERY | Admitting: SURGERY
Payer: MEDICARE

## 2023-07-31 DIAGNOSIS — N18.6 END STAGE RENAL DISEASE (MULTI): ICD-10-CM

## 2023-07-31 LAB
ANION GAP IN SER/PLAS: 18 MMOL/L (ref 10–20)
CALCIUM (MG/DL) IN SER/PLAS: 10.1 MG/DL (ref 8.6–10.3)
CARBON DIOXIDE, TOTAL (MMOL/L) IN SER/PLAS: 23 MMOL/L (ref 21–32)
CHLORIDE (MMOL/L) IN SER/PLAS: 99 MMOL/L (ref 98–107)
CREATININE (MG/DL) IN SER/PLAS: 7.01 MG/DL (ref 0.5–1.05)
GFR FEMALE: 6 ML/MIN/1.73M2
GLUCOSE (MG/DL) IN SER/PLAS: 209 MG/DL (ref 74–99)
POCT GLUCOSE: 152 MG/DL (ref 74–99)
POCT GLUCOSE: 207 MG/DL (ref 74–99)
POTASSIUM (MMOL/L) IN SER/PLAS: 5.1 MMOL/L (ref 3.5–5.3)
SODIUM (MMOL/L) IN SER/PLAS: 135 MMOL/L (ref 136–145)
UREA NITROGEN (MG/DL) IN SER/PLAS: 51 MG/DL (ref 6–23)

## 2023-08-05 ENCOUNTER — APPOINTMENT (OUTPATIENT)
Dept: GENERAL RADIOLOGY | Age: 63
End: 2023-08-05
Payer: MEDICARE

## 2023-08-05 ENCOUNTER — HOSPITAL ENCOUNTER (EMERGENCY)
Age: 63
Discharge: HOME | End: 2023-08-05
Payer: MEDICARE

## 2023-08-05 VITALS
WEIGHT: 207 LBS | OXYGEN SATURATION: 97 % | SYSTOLIC BLOOD PRESSURE: 127 MMHG | RESPIRATION RATE: 16 BRPM | BODY MASS INDEX: 32.42 KG/M2 | HEART RATE: 83 BPM | DIASTOLIC BLOOD PRESSURE: 50 MMHG | TEMPERATURE: 98.3 F

## 2023-08-05 DIAGNOSIS — R07.9 CHEST PAIN, UNSPECIFIED TYPE: Primary | ICD-10-CM

## 2023-08-05 LAB
ALBUMIN SERPL-MCNC: 4.1 G/DL (ref 3.5–4.6)
ALP SERPL-CCNC: 144 U/L (ref 40–130)
ALT SERPL-CCNC: 16 U/L (ref 0–33)
ANION GAP SERPL CALCULATED.3IONS-SCNC: 16 MEQ/L (ref 9–15)
AST SERPL-CCNC: 27 U/L (ref 0–35)
BASOPHILS # BLD: 0.1 K/UL (ref 0–0.2)
BASOPHILS NFR BLD: 0.5 %
BILIRUB SERPL-MCNC: 0.4 MG/DL (ref 0.2–0.7)
BUN SERPL-MCNC: 45 MG/DL (ref 8–23)
CALCIUM SERPL-MCNC: 8.9 MG/DL (ref 8.5–9.9)
CHLORIDE SERPL-SCNC: 96 MEQ/L (ref 95–107)
CO2 SERPL-SCNC: 27 MEQ/L (ref 20–31)
CREAT SERPL-MCNC: 5.29 MG/DL (ref 0.5–0.9)
EKG ATRIAL RATE: 264 BPM
EKG ATRIAL RATE: 276 BPM
EKG Q-T INTERVAL: 444 MS
EKG Q-T INTERVAL: 456 MS
EKG QRS DURATION: 128 MS
EKG QRS DURATION: 132 MS
EKG QTC CALCULATION (BAZETT): 488 MS
EKG QTC CALCULATION (BAZETT): 492 MS
EKG R AXIS: -60 DEGREES
EKG R AXIS: -62 DEGREES
EKG T AXIS: 118 DEGREES
EKG T AXIS: 123 DEGREES
EKG VENTRICULAR RATE: 69 BPM
EKG VENTRICULAR RATE: 74 BPM
EOSINOPHIL # BLD: 0.2 K/UL (ref 0–0.7)
EOSINOPHIL NFR BLD: 1.5 %
ERYTHROCYTE [DISTWIDTH] IN BLOOD BY AUTOMATED COUNT: 15.1 % (ref 11.5–14.5)
GLOBULIN SER CALC-MCNC: 3.2 G/DL (ref 2.3–3.5)
GLUCOSE SERPL-MCNC: 223 MG/DL (ref 70–99)
HCT VFR BLD AUTO: 32.1 % (ref 37–47)
HGB BLD-MCNC: 10.9 G/DL (ref 12–16)
LYMPHOCYTES # BLD: 1.7 K/UL (ref 1–4.8)
LYMPHOCYTES NFR BLD: 11.5 %
MCH RBC QN AUTO: 32.4 PG (ref 27–31.3)
MCHC RBC AUTO-ENTMCNC: 34 % (ref 33–37)
MCV RBC AUTO: 95.3 FL (ref 79.4–94.8)
MONOCYTES # BLD: 0.8 K/UL (ref 0.2–0.8)
MONOCYTES NFR BLD: 5.2 %
NEUTROPHILS # BLD: 12 K/UL (ref 1.4–6.5)
NEUTS SEG NFR BLD: 81.3 %
PLATELET # BLD AUTO: 162 K/UL (ref 130–400)
POTASSIUM SERPL-SCNC: 4.2 MEQ/L (ref 3.4–4.9)
PROCALCITONIN SERPL IA-MCNC: 0.2 NG/ML (ref 0–0.15)
PROT SERPL-MCNC: 7.3 G/DL (ref 6.3–8)
RBC # BLD AUTO: 3.37 M/UL (ref 4.2–5.4)
SODIUM SERPL-SCNC: 139 MEQ/L (ref 135–144)
TROPONIN T SERPL-MCNC: 0.04 NG/ML (ref 0–0.01)
TROPONIN T SERPL-MCNC: 0.04 NG/ML (ref 0–0.01)
WBC # BLD AUTO: 14.8 K/UL (ref 4.8–10.8)

## 2023-08-05 PROCEDURE — 36415 COLL VENOUS BLD VENIPUNCTURE: CPT

## 2023-08-05 PROCEDURE — 71045 X-RAY EXAM CHEST 1 VIEW: CPT

## 2023-08-05 PROCEDURE — 85025 COMPLETE CBC W/AUTO DIFF WBC: CPT

## 2023-08-05 PROCEDURE — 84145 PROCALCITONIN (PCT): CPT

## 2023-08-05 PROCEDURE — 84484 ASSAY OF TROPONIN QUANT: CPT

## 2023-08-05 PROCEDURE — 99285 EMERGENCY DEPT VISIT HI MDM: CPT

## 2023-08-05 PROCEDURE — 2580000003 HC RX 258: Performed by: PHYSICIAN ASSISTANT

## 2023-08-05 PROCEDURE — 6370000000 HC RX 637 (ALT 250 FOR IP): Performed by: PHYSICIAN ASSISTANT

## 2023-08-05 PROCEDURE — 93005 ELECTROCARDIOGRAM TRACING: CPT | Performed by: PHYSICIAN ASSISTANT

## 2023-08-05 PROCEDURE — 80053 COMPREHEN METABOLIC PANEL: CPT

## 2023-08-05 RX ORDER — ASPIRIN 325 MG
325 TABLET ORAL ONCE
Status: COMPLETED | OUTPATIENT
Start: 2023-08-05 | End: 2023-08-05

## 2023-08-05 RX ORDER — 0.9 % SODIUM CHLORIDE 0.9 %
250 INTRAVENOUS SOLUTION INTRAVENOUS ONCE
Status: COMPLETED | OUTPATIENT
Start: 2023-08-05 | End: 2023-08-05

## 2023-08-05 RX ADMIN — NITROGLYCERIN 0.5 INCH: 20 OINTMENT TOPICAL at 17:04

## 2023-08-05 RX ADMIN — SODIUM CHLORIDE 250 ML: 9 INJECTION, SOLUTION INTRAVENOUS at 14:09

## 2023-08-05 RX ADMIN — ASPIRIN 325 MG: 325 TABLET ORAL at 15:27

## 2023-08-05 RX ADMIN — SODIUM CHLORIDE 250 ML: 9 INJECTION, SOLUTION INTRAVENOUS at 15:26

## 2023-08-05 ASSESSMENT — PAIN SCALES - GENERAL: PAINLEVEL_OUTOF10: 6

## 2023-08-05 ASSESSMENT — ENCOUNTER SYMPTOMS
ABDOMINAL DISTENTION: 0
CONSTIPATION: 0
RHINORRHEA: 0
COLOR CHANGE: 0
EYE DISCHARGE: 0
SORE THROAT: 0
SHORTNESS OF BREATH: 0
ABDOMINAL PAIN: 0

## 2023-08-05 ASSESSMENT — HEART SCORE: ECG: 0

## 2023-08-05 ASSESSMENT — PAIN DESCRIPTION - LOCATION: LOCATION: CHEST

## 2023-08-05 ASSESSMENT — PAIN - FUNCTIONAL ASSESSMENT
PAIN_FUNCTIONAL_ASSESSMENT: NONE - DENIES PAIN
PAIN_FUNCTIONAL_ASSESSMENT: 0-10

## 2023-08-05 ASSESSMENT — PAIN DESCRIPTION - PAIN TYPE: TYPE: ACUTE PAIN

## 2023-08-05 ASSESSMENT — PAIN DESCRIPTION - ORIENTATION: ORIENTATION: LEFT;UPPER

## 2023-08-05 NOTE — ED PROVIDER NOTES
[facility] ED  eMERGENCY dEPARTMENT eNCOUnter      Pt Name: Jolene Orlando  MRN: [id number]  [address] [facility] [address] 1960  Date of evaluation: 8/5/2023  Provider: Kelvin Bennett PA-C    CHIEF COMPLAINT       Chief Complaint   Patient presents with    Chest Pain     During dialysis         HISTORY OF PRESENT ILLNESS   (Location/Symptom, Timing/Onset,Context/Setting, Quality, Duration, Modifying Factors, Severity)  Note limiting factors. Jolene Orlando is a 70 y.o. female who presents to the emergency department with complaint of left-sided chest pain which patient states started approximately 1 hour ago while she was on dialysis, she states she does not have any symptoms or problems prior to dialysis, she describes it as a 6 out of 10, as if there is something heavy sitting in the left side of her chest, there is no shortness of breath, no diaphoresis, no nausea or vomiting. Past medical history significant for schizophrenia, hypertension, neurogenic bladder, coronary artery disease, gastric reflux, obesity, neuropathy and diabetes, asthma, thrush, COPD, uncontrolled type 2 diabetes, chronic kidney disease on dialysis, anxiety, degenerative joint disease, congestive heart failure, rheumatoid arthritis, angina. HPI    NursingNotes were reviewed. REVIEW OF SYSTEMS    (2-9 systems for level 4, 10 or more for level 5)     Review of Systems   Constitutional:  Negative for activity change and appetite change. HENT:  Negative for congestion, ear discharge, ear pain, nosebleeds, rhinorrhea and sore throat. Eyes:  Negative for discharge. Respiratory:  Negative for shortness of breath. Cardiovascular:  Positive for chest pain. Negative for palpitations and leg swelling. Gastrointestinal:  Negative for abdominal distention, abdominal pain and constipation. Genitourinary:  Negative for difficulty urinating and dysuria. Musculoskeletal:  Negative for arthralgias.    Skin:  Negative for color PLACEMENT Right 07/01/2020    tunneled HD catheter per Dr Nj Panda       Previous Medications    ACETAMINOPHEN (TYLENOL) 325 MG TABLET    Take 2 tablets by mouth every 4 hours as needed for Pain (For mild to moderate pain (Pain 1-6 out of 10 on pain scale))    ALBUTEROL (PROVENTIL) 2.5 MG/0.5ML NEBU NEBULIZER SOLUTION    Take 0.5 mLs by nebulization every 4 hours as needed for Wheezing or Shortness of Breath    ALCOHOL SWABS (EASY TOUCH ALCOHOL PREP MEDIUM) 70 % PADS    USE AS DIRECTED THREE TIMES A DAY    ARIPIPRAZOLE (ABILIFY) 5 MG TABLET    TAKE 1 TABLET BY MOUTH ONCE DAILY    ASPIRIN EC 81 MG EC TABLET    Take 1 tablet by mouth daily    ATORVASTATIN (LIPITOR) 40 MG TABLET    Take 1 tablet by mouth nightly    B COMPLEX-C-FOLIC ACID (NEPHROCAPS) 1 MG CAPSULE    Take 1 capsule by mouth daily Indications: Dialysis Dependent Chronic Kidney Failure    BLOOD GLUCOSE MONITORING SUPPL (FREESTYLE LITE) CORETTA    1 Device by Does not apply route daily as needed (Diabetes) Use freestyle meter to test blood sugar as needed    BLOOD GLUCOSE MONITORING SUPPL (ONETOUCH VERIO REFLECT) W/DEVICE KIT    Give 1 meter dx E11.65    BLOOD GLUCOSE MONITORING SUPPL (ONETOUCH VERIO) W/DEVICE KIT    AS DIRECTED    BLOOD GLUCOSE TEST STRIPS (ASCENSIA AUTODISC VI;ONE TOUCH ULTRA TEST VI) STRIP    1 each by In Vitro route daily As needed. BLOOD GLUCOSE TEST STRIPS (FREESTYLE LITE) STRIP    1 each by Does not apply route 4 times daily (before meals and nightly) As needed.     BLOOD GLUCOSE TEST STRIPS (FREESTYLE LITE) STRIP    Use three time daily to test BS E11.65    BLOOD GLUCOSE TEST STRIPS (ONETOUCH VERIO) STRIP    QID    CONTINUOUS BLOOD GLUC  (FREESTYLE KAIT 2 READER) CORETTA    Give 1     CONTINUOUS BLOOD GLUC SENSOR (FREESTYLE KAIT 2 SENSOR) MISC    Replace every 14 days E11.65    FLUCONAZOLE (DIFLUCAN) 150 MG TABLET    Take 1 tablet by mouth once    FREESTYLE LANCETS MISC    Test 4x daily

## 2023-08-05 NOTE — ED NOTES
Discharge instructions reviewed with pt. Pt verbalized understanding with no questions or concerns. Resps even, non labored. Skin p/w/d. IV removed. Pt is satisfied with her pain level. No acute distress noted. VSS. GCS 15. Pt is ambulatory with a rollator - gait is steady. Daughter is here to pick pt up at this time.        Anthony Ponce RN  08/05/23 [id number]

## 2023-08-05 NOTE — ED TRIAGE NOTES
Pt arrived via ems from dialysis after devepoping cp after 1 hr 15min of treatment. Pt points to dialysis cath in lt upper chest when asked where pain is. Pt a/o x 3 skin pink w/d resp non labored.  Pt is hypotensive in 80/s

## 2023-08-07 LAB
EKG ATRIAL RATE: 264 BPM
EKG ATRIAL RATE: 276 BPM
EKG Q-T INTERVAL: 444 MS
EKG Q-T INTERVAL: 456 MS
EKG QRS DURATION: 128 MS
EKG QRS DURATION: 132 MS
EKG QTC CALCULATION (BAZETT): 488 MS
EKG QTC CALCULATION (BAZETT): 492 MS
EKG R AXIS: -60 DEGREES
EKG R AXIS: -62 DEGREES
EKG T AXIS: 118 DEGREES
EKG T AXIS: 123 DEGREES
EKG VENTRICULAR RATE: 69 BPM
EKG VENTRICULAR RATE: 74 BPM

## 2023-08-23 ENCOUNTER — OFFICE VISIT (OUTPATIENT)
Dept: PULMONOLOGY | Age: 63
End: 2023-08-23
Payer: MEDICARE

## 2023-08-23 ENCOUNTER — OFFICE VISIT (OUTPATIENT)
Dept: ENDOCRINOLOGY | Age: 63
End: 2023-08-23

## 2023-08-23 VITALS
WEIGHT: 210 LBS | HEIGHT: 67 IN | OXYGEN SATURATION: 94 % | HEART RATE: 57 BPM | TEMPERATURE: 98.4 F | SYSTOLIC BLOOD PRESSURE: 113 MMHG | DIASTOLIC BLOOD PRESSURE: 66 MMHG | BODY MASS INDEX: 32.96 KG/M2

## 2023-08-23 VITALS
TEMPERATURE: 97.6 F | BODY MASS INDEX: 33.27 KG/M2 | HEIGHT: 67 IN | DIASTOLIC BLOOD PRESSURE: 70 MMHG | OXYGEN SATURATION: 94 % | SYSTOLIC BLOOD PRESSURE: 108 MMHG | WEIGHT: 212 LBS | HEART RATE: 75 BPM

## 2023-08-23 DIAGNOSIS — G47.30 SLEEP DISORDER BREATHING: ICD-10-CM

## 2023-08-23 DIAGNOSIS — R06.02 SOB (SHORTNESS OF BREATH): Primary | ICD-10-CM

## 2023-08-23 DIAGNOSIS — R05.9 COUGH, UNSPECIFIED TYPE: ICD-10-CM

## 2023-08-23 DIAGNOSIS — I25.119 CORONARY ARTERY DISEASE WITH ANGINA PECTORIS, UNSPECIFIED VESSEL OR LESION TYPE, UNSPECIFIED WHETHER NATIVE OR TRANSPLANTED HEART (HCC): ICD-10-CM

## 2023-08-23 DIAGNOSIS — E66.9 OBESITY, UNSPECIFIED CLASSIFICATION, UNSPECIFIED OBESITY TYPE, UNSPECIFIED WHETHER SERIOUS COMORBIDITY PRESENT: ICD-10-CM

## 2023-08-23 DIAGNOSIS — E11.65 UNCONTROLLED TYPE 2 DIABETES MELLITUS WITH HYPERGLYCEMIA (HCC): Primary | ICD-10-CM

## 2023-08-23 LAB
CHP ED QC CHECK: NORMAL
GLUCOSE BLD-MCNC: 282 MG/DL

## 2023-08-23 PROCEDURE — 1090F PRES/ABSN URINE INCON ASSESS: CPT | Performed by: INTERNAL MEDICINE

## 2023-08-23 PROCEDURE — G8399 PT W/DXA RESULTS DOCUMENT: HCPCS | Performed by: INTERNAL MEDICINE

## 2023-08-23 PROCEDURE — G8417 CALC BMI ABV UP PARAM F/U: HCPCS | Performed by: INTERNAL MEDICINE

## 2023-08-23 PROCEDURE — G9898 SNP/LG TRM CRE PT W/POS CDE: HCPCS | Performed by: INTERNAL MEDICINE

## 2023-08-23 PROCEDURE — 1036F TOBACCO NON-USER: CPT | Performed by: INTERNAL MEDICINE

## 2023-08-23 PROCEDURE — G8427 DOCREV CUR MEDS BY ELIG CLIN: HCPCS | Performed by: INTERNAL MEDICINE

## 2023-08-23 PROCEDURE — 3078F DIAST BP <80 MM HG: CPT | Performed by: INTERNAL MEDICINE

## 2023-08-23 PROCEDURE — G9901 SNP/LG TRM CRE PT W/POS CDE: HCPCS | Performed by: INTERNAL MEDICINE

## 2023-08-23 PROCEDURE — 3074F SYST BP LT 130 MM HG: CPT | Performed by: INTERNAL MEDICINE

## 2023-08-23 PROCEDURE — 1123F ACP DISCUSS/DSCN MKR DOCD: CPT | Performed by: INTERNAL MEDICINE

## 2023-08-23 PROCEDURE — 99204 OFFICE O/P NEW MOD 45 MIN: CPT | Performed by: INTERNAL MEDICINE

## 2023-08-23 RX ORDER — FLUTICASONE FUROATE, UMECLIDINIUM BROMIDE AND VILANTEROL TRIFENATATE 100; 62.5; 25 UG/1; UG/1; UG/1
1 POWDER RESPIRATORY (INHALATION) DAILY
Qty: 1 EACH | Refills: 0 | COMMUNITY
Start: 2023-08-23

## 2023-08-23 NOTE — PROGRESS NOTES
NEW PATIENT VISIT-PULMONARY/SLEEP    8/23/2023     REFERRING PHYSICIAN:  Olivier Hahn MD     REASON FOR REFERRAL:  SOB    HPI:     Brandie Reece is a 70 y.o. female who was referred to pulmonary clinic for evaluation. She was told in the past that she has COPD but has not been on inhalers. She has been having shortness of breath mainly with activities but sometimes at rest.  Has been evaluated by cardiology and known to have coronary artery disease. Has been given nebulizer treatment in the past which he used it years ago but not anymore. She has some chest tightness but does not remember wheezing. She has some dry cough intermittently. Does not smoke but has secondhand smoking. Snores and wakes up gasping for air. She started sleepy during the day and she dozes off easily.                   Past Medical History   Past Medical History:   Diagnosis Date    Angina at rest [facility]) 5/24/2020    Anxiety     CAD S/P percutaneous coronary angioplasty 2015, 2018    stents per dr Gurvinder Price    CHF (congestive heart failure) ([address])     CKD (chronic kidney disease) stage 4, GFR 15-29 ml/min ([address]) 2/24/2018    CKD stage 4 due to type 2 diabetes mellitus ([address])     Contusion of right chest wall 2/16/2021    COPD (chronic obstructive pulmonary disease) ([address])     Diabetic nephropathy with proteinuria ([address]) 2014    DJD (degenerative joint disease) of knee     Dr Monserrat Greco    GERD (gastroesophageal reflux disease)     Hemiparesis, left ([address]) 2013    entered Ass[facility] Living ([facility])    Hemodialysis patient [facility])     Hemodialysis-associated hypotension 10/22/2021    History of heart failure     History of seizures     History of type C viral hepatitis     HTN (hypertension)     Hyperlipidemia     Impaired mobility and activities of daily living     Infection of venous access port 7/29/2022    Mediastinal lymphadenopathy 2013    Dr Margarette Sanders    Metabolic syndrome     Moderate persistent

## 2023-08-27 ASSESSMENT — ENCOUNTER SYMPTOMS: EYES NEGATIVE: 1

## 2023-09-14 NOTE — H&P
History of Present Illness:   History Present Illness:  Reason for surgery: ESRD need for permanent dialysis  access   HPI:    S/P failed left brachiocephalic AVF. She needs revision to basilic or AVG.    Allergies:        Allergies:  ·  codeine : Hives/Urticaria (Severe)  ·  Percocet : Unknown  ·  OxyContin : Unknown    Home Medication Review:   Home Medications Reviewed: yes       Physical Exam by System:    Constitutional: Well developed, awake/alert/oriented  x3, no distress, alert and cooperative   Eyes: PERRL, EOMI, clear sclera   Respiratory/Thorax: Patent airways, CTAB, normal  breath sounds with good chest expansion, thorax symmetric   Cardiovascular: Regular, rate and rhythm, no murmurs,  2+ equal pulses of the extremities, normal S 1and S 2   Musculoskeletal: ROM intact, no joint swelling, normal  strength   Extremities: normal extremities, no cyanosis edema,  contusions or wounds, no clubbing   Neurological: alert and oriented x3, intact senses,  motor, response and reflexes, normal strength   Psychological: Appropriate mood and behavior   Skin: Warm and dry, no lesions, no rashes     Consent:   COVID-19 Consent:  ·  COVID-19 Risk Consent Surgeon has reviewed key risks related to the risk of rekha COVID-19 and if they contract COVID-19 what the risks are.       Electronic Signatures:  Dusty Dover)  (Signed 24-Apr-2023 09:41)   Authored: History of Present Illness, Allergies, Home  Medication Review, Physical Exam, Consent, Note Completion      Last Updated: 24-Apr-2023 09:41 by Dusty Dover)

## 2023-09-18 ENCOUNTER — HOSPITAL ENCOUNTER (OUTPATIENT)
Dept: PULMONOLOGY | Age: 63
Discharge: HOME | End: 2023-09-18
Payer: MEDICARE

## 2023-09-18 ENCOUNTER — OFFICE VISIT (OUTPATIENT)
Dept: CARDIOLOGY CLINIC | Age: 63
End: 2023-09-18
Payer: MEDICARE

## 2023-09-18 VITALS
WEIGHT: 218.8 LBS | DIASTOLIC BLOOD PRESSURE: 62 MMHG | SYSTOLIC BLOOD PRESSURE: 118 MMHG | OXYGEN SATURATION: 93 % | BODY MASS INDEX: 34.34 KG/M2 | HEART RATE: 108 BPM | HEIGHT: 67 IN

## 2023-09-18 DIAGNOSIS — R07.9 CHEST PAIN, UNSPECIFIED TYPE: ICD-10-CM

## 2023-09-18 DIAGNOSIS — R06.02 SOB (SHORTNESS OF BREATH): ICD-10-CM

## 2023-09-18 DIAGNOSIS — I25.119 CORONARY ARTERY DISEASE INVOLVING NATIVE HEART WITH ANGINA PECTORIS, UNSPECIFIED VESSEL OR LESION TYPE (HCC): ICD-10-CM

## 2023-09-18 DIAGNOSIS — Z95.1 S/P SINGLE VESSEL CORONARY ARTERY BYPASS: ICD-10-CM

## 2023-09-18 DIAGNOSIS — R09.89 BILATERAL CAROTID BRUITS: ICD-10-CM

## 2023-09-18 DIAGNOSIS — R26.2 DIFFICULTY IN WALKING: ICD-10-CM

## 2023-09-18 DIAGNOSIS — I95.89 HYPOTENSION DUE TO HYPOVOLEMIA: Primary | ICD-10-CM

## 2023-09-18 DIAGNOSIS — E86.1 HYPOTENSION DUE TO HYPOVOLEMIA: Primary | ICD-10-CM

## 2023-09-18 DIAGNOSIS — I10 ESSENTIAL (PRIMARY) HYPERTENSION: ICD-10-CM

## 2023-09-18 DIAGNOSIS — I25.119 CORONARY ARTERY DISEASE INVOLVING NATIVE CORONARY ARTERY OF NATIVE HEART WITH ANGINA PECTORIS (HCC): ICD-10-CM

## 2023-09-18 DIAGNOSIS — R06.09 DOE (DYSPNEA ON EXERTION): ICD-10-CM

## 2023-09-18 PROCEDURE — 94729 DIFFUSING CAPACITY: CPT

## 2023-09-18 PROCEDURE — 94060 EVALUATION OF WHEEZING: CPT

## 2023-09-18 PROCEDURE — G8417 CALC BMI ABV UP PARAM F/U: HCPCS | Performed by: INTERNAL MEDICINE

## 2023-09-18 PROCEDURE — 1036F TOBACCO NON-USER: CPT | Performed by: INTERNAL MEDICINE

## 2023-09-18 PROCEDURE — 3078F DIAST BP <80 MM HG: CPT | Performed by: INTERNAL MEDICINE

## 2023-09-18 PROCEDURE — G8427 DOCREV CUR MEDS BY ELIG CLIN: HCPCS | Performed by: INTERNAL MEDICINE

## 2023-09-18 PROCEDURE — 6360000002 HC RX W HCPCS

## 2023-09-18 PROCEDURE — 1090F PRES/ABSN URINE INCON ASSESS: CPT | Performed by: INTERNAL MEDICINE

## 2023-09-18 PROCEDURE — G9898 SNP/LG TRM CRE PT W/POS CDE: HCPCS | Performed by: INTERNAL MEDICINE

## 2023-09-18 PROCEDURE — 3074F SYST BP LT 130 MM HG: CPT | Performed by: INTERNAL MEDICINE

## 2023-09-18 PROCEDURE — G9901 SNP/LG TRM CRE PT W/POS CDE: HCPCS | Performed by: INTERNAL MEDICINE

## 2023-09-18 PROCEDURE — G8399 PT W/DXA RESULTS DOCUMENT: HCPCS | Performed by: INTERNAL MEDICINE

## 2023-09-18 PROCEDURE — 99214 OFFICE O/P EST MOD 30 MIN: CPT | Performed by: INTERNAL MEDICINE

## 2023-09-18 PROCEDURE — 94726 PLETHYSMOGRAPHY LUNG VOLUMES: CPT

## 2023-09-18 PROCEDURE — 1123F ACP DISCUSS/DSCN MKR DOCD: CPT | Performed by: INTERNAL MEDICINE

## 2023-09-18 RX ORDER — ALBUTEROL SULFATE 2.5 MG/3ML
SOLUTION RESPIRATORY (INHALATION)
Status: COMPLETED
Start: 2023-09-18 | End: 2023-09-18

## 2023-09-18 RX ADMIN — ALBUTEROL SULFATE 2.5 MG: 2.5 SOLUTION RESPIRATORY (INHALATION) at 12:15

## 2023-09-18 ASSESSMENT — ENCOUNTER SYMPTOMS
NAUSEA: 0
SHORTNESS OF BREATH: 0
STRIDOR: 0
BLOOD IN STOOL: 0
RESPIRATORY NEGATIVE: 1
CHEST TIGHTNESS: 0
COUGH: 0
GASTROINTESTINAL NEGATIVE: 1
WHEEZING: 0
EYES NEGATIVE: 1

## 2023-09-18 NOTE — PROGRESS NOTES
GLUCOSE 282 08/23/2023 01:18 PM    GLUCOSE 90 10/24/2022 10:43 AM    PROT 7.3 08/05/2023 01:55 PM    LABALBU 4.1 08/05/2023 01:55 PM    CALCIUM 8.9 08/05/2023 01:55 PM    BILITOT 0.4 08/05/2023 01:55 PM    ALKPHOS 144 08/05/2023 01:55 PM    AST 27 08/05/2023 01:55 PM    ALT 16 08/05/2023 01:55 PM     BMP:    Lab Results   Component Value Date/Time     08/05/2023 01:55 PM    K 4.2 08/05/2023 01:55 PM    K 4.3 07/04/2022 03:07 AM    CL 96 08/05/2023 01:55 PM    CO2 27 08/05/2023 01:55 PM    BUN 45 08/05/2023 01:55 PM    LABALBU 4.1 08/05/2023 01:55 PM    CREATININE 5.29 08/05/2023 01:55 PM    CALCIUM 8.9 08/05/2023 01:55 PM    GFRAA 17.5 10/06/2022 11:15 PM    LABGLOM 8.4 08/05/2023 01:55 PM    GLUCOSE 282 08/23/2023 01:18 PM    GLUCOSE 90 10/24/2022 10:43 AM     Magnesium:    Lab Results   Component Value Date/Time    MG 1.9 03/04/2023 02:40 PM     TSH:  Lab Results   Component Value Date    TSH 0.502 02/23/2023       Patient Active Problem List   Diagnosis    Atherosclerotic heart disease of native coronary artery with unspecified angina pectoris (HCC)    Schizophrenia, paranoid, chronic    Metabolic syndrome    Vitamin B 12 deficiency    Cerebral microvascular disease    Mixed hyperlipidemia    Other hammer toe (acquired)    Vitamin D insufficiency    Incontinence of urine    Diabetic nephropathy with proteinuria (HCC)    Essential (primary) hypertension    History of type C viral hepatitis    Urinary incontinence due to cognitive impairment    History of seizures    Stented coronary artery-plan is to stay on Plavix indefinately per Dr Nancy Connell    Inadequately controlled diabetes mellitus ([address])    Controlled type 2 diabetes mellitus with diabetic neuropathy, with long-term current use of insulin (HCC)    Hemiparesis, left (HCC)    Angina, class II (HCC)    Pain, unspecified    Tardive dyskinesia    Shortness of breath    Uncontrolled type 2 diabetes mellitus with hyperglycemia (HCC)    Chronic diastolic

## 2023-09-22 NOTE — PROCEDURES
[place] [address] [facility]                    PULMONARY FUNCTION  Michelle Hobbs   70 y.o.   female  Height 64 in  Weight 212 lb      Referring provider   Fernanda Gomez MD    Reading provider   Fernanda Gomez MD              Test interpretation:    Spirometry is suggestive of restrictive ventilatory defect with no significant response to bronchodilators. Total lung capacity is moderately reduced. Diffusion capacity is severely reduced. Clinical and radiographic correlation is recommended.      Fernanda Gomez MD , 9/22/2023 11:58 AM

## 2023-09-26 PROBLEM — G56.00 CARPAL TUNNEL SYNDROME: Status: ACTIVE | Noted: 2023-09-26

## 2023-09-26 PROBLEM — Z99.2 ESRD ON HEMODIALYSIS (MULTI): Status: ACTIVE | Noted: 2023-09-26

## 2023-09-26 PROBLEM — N18.6 ESRD ON HEMODIALYSIS (MULTI): Status: ACTIVE | Noted: 2023-09-26

## 2023-09-26 PROBLEM — M17.12 OSTEOARTHRITIS OF LEFT KNEE: Status: ACTIVE | Noted: 2023-09-26

## 2023-09-26 PROBLEM — I25.10 CORONARY ARTERY DISEASE: Status: ACTIVE | Noted: 2023-09-26

## 2023-09-26 PROBLEM — T84.84XA: Status: ACTIVE | Noted: 2023-09-26

## 2023-09-26 PROBLEM — S22.39XA RIB FRACTURE: Status: ACTIVE | Noted: 2023-09-26

## 2023-09-26 PROBLEM — T82.858A: Status: ACTIVE | Noted: 2023-09-26

## 2023-09-26 RX ORDER — IPRATROPIUM BROMIDE AND ALBUTEROL SULFATE 2.5; .5 MG/3ML; MG/3ML
3 SOLUTION RESPIRATORY (INHALATION) 4 TIMES DAILY PRN
COMMUNITY

## 2023-09-26 RX ORDER — LANOLIN ALCOHOL/MO/W.PET/CERES
400 CREAM (GRAM) TOPICAL NIGHTLY
COMMUNITY

## 2023-09-26 RX ORDER — ACETAMINOPHEN 325 MG/1
TABLET ORAL
COMMUNITY

## 2023-09-26 RX ORDER — INSULIN GLARGINE 100 [IU]/ML
70 INJECTION, SOLUTION SUBCUTANEOUS NIGHTLY
COMMUNITY

## 2023-09-26 RX ORDER — AMLODIPINE BESYLATE 10 MG/1
TABLET ORAL
COMMUNITY
Start: 2020-06-01

## 2023-09-26 RX ORDER — LIDOCAINE AND PRILOCAINE 25; 25 MG/G; MG/G
CREAM TOPICAL
COMMUNITY
Start: 2023-03-16

## 2023-09-26 RX ORDER — MIDODRINE HYDROCHLORIDE 10 MG/1
TABLET ORAL
COMMUNITY
Start: 2023-07-12

## 2023-09-26 RX ORDER — TRIAMCINOLONE ACETONIDE 1 MG/ML
1 LOTION TOPICAL DAILY PRN
COMMUNITY

## 2023-09-26 RX ORDER — VITAMIN E MIXED 400 UNIT
200 CAPSULE ORAL DAILY
COMMUNITY

## 2023-09-26 RX ORDER — FOLIC ACID/VIT B COMPLEX AND C 0.8 MG
1 TABLET ORAL EVERY EVENING
COMMUNITY

## 2023-09-26 RX ORDER — ASCORBIC ACID 250 MG
250 TABLET ORAL DAILY
COMMUNITY

## 2023-09-26 RX ORDER — INSULIN ASPART 100 [IU]/ML
12 INJECTION, SOLUTION INTRAVENOUS; SUBCUTANEOUS
COMMUNITY

## 2023-09-26 RX ORDER — HYDROXYZINE HYDROCHLORIDE 25 MG/1
25 TABLET, FILM COATED ORAL 3 TIMES DAILY PRN
COMMUNITY
Start: 2023-06-12

## 2023-09-26 RX ORDER — ARIPIPRAZOLE 5 MG/1
5 TABLET ORAL DAILY
COMMUNITY
Start: 2020-11-03

## 2023-09-26 RX ORDER — DICLOFENAC SODIUM 10 MG/G
GEL TOPICAL
COMMUNITY
Start: 2022-09-30

## 2023-09-26 RX ORDER — NITROGLYCERIN 0.4 MG/1
TABLET SUBLINGUAL
COMMUNITY

## 2023-09-26 RX ORDER — TRAMADOL HYDROCHLORIDE 50 MG/1
50 TABLET ORAL 2 TIMES DAILY PRN
COMMUNITY

## 2023-09-26 RX ORDER — ONDANSETRON 4 MG/1
4 TABLET, ORALLY DISINTEGRATING ORAL 2 TIMES DAILY PRN
COMMUNITY
Start: 2023-07-19

## 2023-09-26 RX ORDER — SENNOSIDES 8.6 MG/1
1 TABLET ORAL NIGHTLY
COMMUNITY

## 2023-09-26 RX ORDER — LISINOPRIL 10 MG/1
1 TABLET ORAL DAILY
COMMUNITY

## 2023-09-26 RX ORDER — CHOLECALCIFEROL (VITAMIN D3) 50 MCG
50 TABLET ORAL
COMMUNITY
Start: 2022-10-07

## 2023-09-26 RX ORDER — INSULIN LISPRO 100 [IU]/ML
INJECTION, SUSPENSION SUBCUTANEOUS
COMMUNITY

## 2023-09-26 RX ORDER — CALCIUM CARBONATE 500(1250)
2 TABLET ORAL 4 TIMES DAILY PRN
COMMUNITY
Start: 2023-05-30

## 2023-09-26 RX ORDER — ACETAMINOPHEN, DIPHENHYDRAMINE HCL, PHENYLEPHRINE HCL 325; 25; 5 MG/1; MG/1; MG/1
1 TABLET ORAL NIGHTLY PRN
COMMUNITY

## 2023-09-26 RX ORDER — PREDNISONE 20 MG/1
40 TABLET ORAL
COMMUNITY
Start: 2023-03-09

## 2023-09-26 RX ORDER — ALBUTEROL SULFATE 0.83 MG/ML
SOLUTION RESPIRATORY (INHALATION)
COMMUNITY

## 2023-09-26 RX ORDER — PEN NEEDLE, DIABETIC 30 GX5/16"
NEEDLE, DISPOSABLE MISCELLANEOUS
COMMUNITY
Start: 2023-02-10

## 2023-09-26 RX ORDER — ALBUTEROL SULFATE 90 UG/1
AEROSOL, METERED RESPIRATORY (INHALATION)
COMMUNITY

## 2023-09-26 RX ORDER — SEVELAMER CARBONATE 800 MG/1
800 TABLET, FILM COATED ORAL
COMMUNITY
Start: 2023-06-20

## 2023-09-26 RX ORDER — ISOSORBIDE MONONITRATE 30 MG/1
4 TABLET, EXTENDED RELEASE ORAL DAILY
COMMUNITY
Start: 2020-07-27

## 2023-09-26 RX ORDER — PANTOPRAZOLE SODIUM 20 MG/1
20 TABLET, DELAYED RELEASE ORAL DAILY
COMMUNITY
Start: 2020-05-30

## 2023-09-26 RX ORDER — HYDROCODONE BITARTRATE AND ACETAMINOPHEN 5; 325 MG/1; MG/1
1 TABLET ORAL 4 TIMES DAILY
COMMUNITY
Start: 2023-04-24

## 2023-09-26 RX ORDER — TALC
POWDER (GRAM) TOPICAL
COMMUNITY
Start: 2020-03-10

## 2023-09-26 RX ORDER — INSULIN GLARGINE 100 [IU]/ML
60 INJECTION, SOLUTION SUBCUTANEOUS NIGHTLY
COMMUNITY
Start: 2023-07-18

## 2023-09-26 RX ORDER — INSULIN GLARGINE 100 [IU]/ML
INJECTION, SOLUTION SUBCUTANEOUS
COMMUNITY

## 2023-09-26 RX ORDER — PREGABALIN 75 MG/1
CAPSULE ORAL
COMMUNITY
Start: 2021-01-18

## 2023-09-26 RX ORDER — METOPROLOL TARTRATE 25 MG/1
0.5 TABLET, FILM COATED ORAL 2 TIMES DAILY
COMMUNITY
Start: 2021-01-14

## 2023-09-26 RX ORDER — MIDODRINE HYDROCHLORIDE 5 MG/1
5 TABLET ORAL
COMMUNITY
Start: 2023-06-16

## 2023-09-26 RX ORDER — PNV NO.95/FERROUS FUM/FOLIC AC 28MG-0.8MG
TABLET ORAL
COMMUNITY
Start: 2019-11-22

## 2023-09-26 RX ORDER — FLUTICASONE PROPIONATE 50 MCG
SPRAY, SUSPENSION (ML) NASAL
COMMUNITY

## 2023-09-26 RX ORDER — ISOSORBIDE MONONITRATE 60 MG/1
60 TABLET, EXTENDED RELEASE ORAL DAILY
COMMUNITY

## 2023-09-26 RX ORDER — SERTRALINE HYDROCHLORIDE 50 MG/1
50 TABLET, FILM COATED ORAL DAILY
COMMUNITY

## 2023-09-26 RX ORDER — BLOOD-GLUCOSE METER
1 KIT MISCELLANEOUS
COMMUNITY
Start: 2020-09-23

## 2023-09-26 RX ORDER — ATORVASTATIN CALCIUM 40 MG/1
40 TABLET, FILM COATED ORAL NIGHTLY
COMMUNITY

## 2023-09-26 RX ORDER — INSULIN ADMIN. SUPPLIES
INSULIN PEN (EA) SUBCUTANEOUS
COMMUNITY
Start: 2023-06-23

## 2023-09-26 RX ORDER — IBUPROFEN 800 MG/1
800 TABLET ORAL 2 TIMES DAILY PRN
COMMUNITY

## 2023-09-26 RX ORDER — NYSTATIN 100000 [USP'U]/G
1 POWDER TOPICAL 2 TIMES DAILY PRN
COMMUNITY

## 2023-09-26 RX ORDER — RANOLAZINE 500 MG/1
TABLET, EXTENDED RELEASE ORAL
COMMUNITY
Start: 2020-11-24

## 2023-09-26 RX ORDER — ASPIRIN 81 MG/1
81 TABLET ORAL DAILY
COMMUNITY
Start: 2020-05-04

## 2023-09-26 RX ORDER — DIPHENOXYLATE HYDROCHLORIDE AND ATROPINE SULFATE 2.5; .025 MG/1; MG/1
1 TABLET ORAL 2 TIMES DAILY
COMMUNITY
Start: 2023-03-08

## 2023-09-29 VITALS — WEIGHT: 208.78 LBS | BODY MASS INDEX: 32.77 KG/M2 | HEIGHT: 67 IN

## 2023-10-02 NOTE — OP NOTE
Post Operative Note:     PreOp Diagnosis: ESRD   Post-Procedure Diagnosis: same   Procedure: 1. Left axillary-axillary AVG placement  (4-7 propaten)   Surgeon: Stanislaw   Resident/Fellow/Other Assistant: Po   Anesthesia: GETA   Estimated Blood Loss (mL): minimal   Specimen: no   Findings: strong thrill in the AVG and palpable radial  pulse at the conclusion     Operative Report Dictated:  Dictation: not applicable - note contains Operative  Report   Operative Report:    Indications: This patient has had multiple failed RUE dialysis access procedures as well as failed LUE access. She requires permanent dialysis access.  Procedure: The patient was brought to the OR and placed supine on the table with left arm out. GETA was induced and left arm prepped and draped in sterile fashion. The axillary vein and artery were exposed through a longitudinal incision in the upper  arm near the axillary hair line. A tunnel was created across the upper arm and 4-7 graft pulled through. Heparin was administered. The axillary artery was occluded with clamps and 4mm punch used to create a round arteriotomy. The 4mm end of the graft  was beveled slightly and sewn to the side of the artery using 6-0 prolene suture. Air was flushed out the end of the graft and then the graft was clamped. The axillary vein was then occluded with clamps and venotomy created and the 7mm end of the graft  was beveled and sewn to the vein with 6-0 prolene suture. After de-airing the graft the ansatomosis was completed and clamps removed. There was a good thrill in the graft and the anastomoses were hemostatic. There was a palpable radial pulse at the wrist.  PRotamine was administered. The wound was irrigated and closed with interrupted vicryl for deep layer and 4-0 monocryl for skin. The counter incision was closed in similar fashion and skin glue applied. The patient was awakened and taken to PACU in good  condition.    Attestation:   Note  Completion:  Attending Attestation I was present for the entire procedure         Electronic Signatures:  Dusty Dover)  (Signed 31-Jul-2023 15:37)   Authored: Post Operative Note, Note Completion      Last Updated: 31-Jul-2023 15:37 by Dusty Dover)

## 2023-10-02 NOTE — OP NOTE
Post Operative Note:     PreOp Diagnosis: ESRD   Post-Procedure Diagnosis: same   Procedure: 1. Left brachiobasilic AVF creation and  transposition   Surgeon: Stanislaw   Resident/Fellow/Other Assistant: GABRIELA Olson   Anesthesia: regional/MAC   Estimated Blood Loss (mL): minimal   Specimen: no   Findings: strong thrill at the conclusion, palpable  radial pulse     Operative Report Dictated:  Dictation: not applicable - note contains Operative  Report   Operative Report:    Indications: This patient had a prior brachiocephalic AVF creation. She has aberrant anatomy and the cephalic vein outflow terminates in the mid-upper arm making  her fistula unusuable. She requires a new fistula.  Procedure: Informed consent was obtained. The patient was brought to the OR and placed supine on the table. The left arm was abducted and ultrasound was used to interrogate the veins in the arm. The basilic vein in the upper arm was the most suitable.The  arm was prepped and draped in sterlie fashion. A 4cm transverse incision was made just distal to the antecubital fossa. The incision was deepened with cautery and the basilic vein was identified. Small side branches were ligated using silk suture. A second  incision was made longitudinally over the course of the vein in the upper arm proximal to the antecubital fossa. The tissue was divided with cautery and the basilic vein was identified and side branches ligated. A third incision was made further up in  the arm to expose more basilic vein and ligate side branches. In the more distal upper arm incision, the prior brachiocephalic AVF anastomosis was exposed and controlled. The basilic vein was then transected below the elbow and pulled out of the incisions  and flushed. Two small holes were repaired with 7-0 prolene. The vein was marked and a tunneler was used to tunnel the vein across the upper arm in a more anterior course. The cephalic vein was clamped just off the arterial  anastomosis, leaving a short  cuff of vein on the artery. The distal end was ligated with silk suture. The basilic vein was then trimmed and beveled and the veins were sewn together in end-to-end fashion using 6-0 prolene. The anastomosis was flushed and de-aired. There was good hemostasis.  There was an excellent thrill in the fistula.The incisions wwere closed with interrupted 3-0 vicryl for the deep dermal layer and 4-0 monocryl for the skin. Sterile dressings were applied and the patient was taken to the recovery area in good condition.     Attestation:   Note Completion:  Attending Attestation I was present for the entire procedure         Electronic Signatures:  Dusty Dover)  (Signed 24-Apr-2023 15:33)   Authored: Post Operative Note, Note Completion      Last Updated: 24-Apr-2023 15:33 by Dusty Dover)

## 2023-10-02 NOTE — OP NOTE
Post Operative Note:     PreOp Diagnosis: LUE fistula malfunction   Post-Procedure Diagnosis: same   Procedure: 1. LUE fistulagram and angioplasty of  anastomosis and proximal outflow using 4mm balloon  2. 30 min supervision of moderate conscious sedation   Surgeon: Stanislaw   Resident/Fellow/Other Assistant: None   Anesthesia: sedation/local   Estimated Blood Loss (mL): minimal   Specimen: no   Findings: High grade stenosis at the anastomosis  and proximal outflow which resolved with angioplasty     Operative Report Dictated:  Dictation: not applicable - note contains Operative  Report   Operative Report:    Indications: This patient has a left brachiobasilic AVF that had poor maturation and evidence of proximal stenosis on duplex.  Procedure: The patient was brought to the cath lab and placed supine on the table and the left arm was prepped and draped in sterile fashion. I supervised the administration of fentanyl and versed sedation. The fistula outflow was accessed using micropuncture  technique, in retrograde direction, and a fistulagram was performed demonstrating a stenosis extending from the arterial anasotmosis for about 3cm into the vein. Heparin was administered and stiff glide wire was advanced across the lesion into the brachial  artery and a 5Fr sheath was placed. I then performed angioplasty of the stenosis using 4mm balloon as the arterial anastomosis was known to be 4mm in diameter. The balloon was deflated and removed and repeat fistulagram showed excellent resolution of  the stenosis. There was a strong thrill in the fistula now. The sheath and wire were pulled after placing a purse string suture around the access site and tying this down for hemostasis. Sterile dressings were applied and the patient was taken to the  recovery area in good condition.     Attestation:   Note Completion:  Attending Attestation I was present for the entire procedure         Electronic Signatures:  Stanislaw  Dusty BRYANT)  (Signed 27-Jun-2023 13:14)   Authored: Post Operative Note, Note Completion      Last Updated: 27-Jun-2023 13:14 by Dusty Dover)

## 2023-10-05 ENCOUNTER — OFFICE VISIT (OUTPATIENT)
Dept: VASCULAR SURGERY | Facility: CLINIC | Age: 63
End: 2023-10-05
Payer: MEDICARE

## 2023-10-05 ENCOUNTER — TRANSCRIBE ORDERS (OUTPATIENT)
Dept: VASCULAR SURGERY | Facility: HOSPITAL | Age: 63
End: 2023-10-05

## 2023-10-05 VITALS
HEIGHT: 66 IN | WEIGHT: 216 LBS | HEART RATE: 86 BPM | OXYGEN SATURATION: 96 % | RESPIRATION RATE: 16 BRPM | TEMPERATURE: 96.8 F | SYSTOLIC BLOOD PRESSURE: 93 MMHG | BODY MASS INDEX: 34.72 KG/M2 | DIASTOLIC BLOOD PRESSURE: 46 MMHG

## 2023-10-05 DIAGNOSIS — Z99.2 ESRD (END STAGE RENAL DISEASE) ON DIALYSIS (MULTI): ICD-10-CM

## 2023-10-05 DIAGNOSIS — Z99.2 DEPENDENCE ON HEMODIALYSIS (CMS-HCC): ICD-10-CM

## 2023-10-05 DIAGNOSIS — N18.6 ESRD (END STAGE RENAL DISEASE) ON DIALYSIS (MULTI): ICD-10-CM

## 2023-10-05 DIAGNOSIS — N18.6 ESRD (END STAGE RENAL DISEASE) (MULTI): Primary | ICD-10-CM

## 2023-10-05 PROCEDURE — 1036F TOBACCO NON-USER: CPT | Performed by: SURGERY

## 2023-10-05 PROCEDURE — 1125F AMNT PAIN NOTED PAIN PRSNT: CPT | Performed by: SURGERY

## 2023-10-05 PROCEDURE — 99024 POSTOP FOLLOW-UP VISIT: CPT | Performed by: SURGERY

## 2023-10-05 PROCEDURE — 99214 OFFICE O/P EST MOD 30 MIN: CPT | Performed by: SURGERY

## 2023-10-05 PROCEDURE — 1159F MED LIST DOCD IN RCRD: CPT | Performed by: SURGERY

## 2023-10-05 NOTE — PROGRESS NOTES
Vascular Surgery Clinic Note    CC: dialsysi    HPI:  Danica So is 63 y.o. female with history of ESRD on HD via left-ax-ax AVG. She was sent for TDC removal today, but says the dialysis center told her the flow was difficult to feel in the AVG. She has chronic hypotension. BP today was 93/46. She is on midodrine. She does not feel dizzy or lightheaded.    Medical History:   has a past medical history of Hypoesthesia of skin (03/27/2020), Personal history of other diseases of the circulatory system (03/27/2020), Personal history of other diseases of the circulatory system (03/27/2020), Personal history of other diseases of the musculoskeletal system and connective tissue, Personal history of other diseases of the nervous system and sense organs (03/27/2020), Personal history of other endocrine, nutritional and metabolic disease, and Unspecified visual loss (03/27/2020).    Meds:   Current Outpatient Medications on File Prior to Visit   Medication Sig Dispense Refill    acetaminophen (Tylenol) 325 mg tablet Take by mouth.      albuterol (Ventolin HFA) 90 mcg/actuation inhaler Inhale.      albuterol 2.5 mg /3 mL (0.083 %) nebulizer solution Take by nebulization. Use 0.5 ml in 2 to 3  ml of saline every 4 to 6 hours as needed for cough and wheeze      amLODIPine (Norvasc) 10 mg tablet Take by mouth.      Anti-DiarrheaL, loperamide, 2 mg tablet Take 1 tablet (2 mg) by mouth 4 times a day as needed for diarrhea.      ARIPiprazole (Abilify) 5 mg tablet Take 1 tablet (5 mg) by mouth once daily.      ascorbic acid (Vitamin C) 250 mg tablet Take 1 tablet (250 mg) by mouth once daily. With vitamin e      aspirin 81 mg EC tablet Take 1 tablet (81 mg) by mouth once daily.      atorvastatin (Lipitor) 40 mg tablet Take 1 tablet (40 mg) by mouth once daily at bedtime.      B complex-vitamin C-folic acid (Nephro-Geraldine) 0.8 mg tablet Take 1 tablet by mouth once daily in the evening.      cholecalciferol (Vitamin D-3) 50 MCG  (2000 UT) tablet Take 1 tablet (50 mcg) by mouth once daily in the evening. Take with meals.      cyanocobalamin (Vitamin B-12) 100 mcg tablet Take by mouth.      diclofenac sodium (Voltaren) 1 % gel gel APPLY TO LOWER EXTREMETIES. APPLY 4 GRAMS OF GEL TO AFFECTED AREA 4 TIMES DAILY. DO NOT APPLY MORE THAN 16 GRAMS DAILY TO ANY ONE AFFECTED      diphenoxylate-atropine (Lomotil) 2.5-0.025 mg tablet Take 1 tablet by mouth 2 times a day.      fluticasone (Flonase) 50 mcg/actuation nasal spray Administer into affected nostril(s).      FreeStyle Lite Strips strip 1 each by in vitro route 4 times a day with meals.      HYDROcodone-acetaminophen (Norco) 5-325 mg tablet Take 1 tablet by mouth 4 times a day.      hydrOXYzine HCL (Atarax) 25 mg tablet Take 1 tablet (25 mg) by mouth 3 times a day as needed.      ibuprofen 800 mg tablet Take 1 tablet (800 mg) by mouth 2 times a day as needed.      insulin glargine (Lantus Solostar U-100 Insulin) 100 unit/mL (3 mL) pen Inject under the skin. Use as directed      ipratropium-albuteroL (Duo-Neb) 0.5-2.5 mg/3 mL nebulizer solution Inhale 3 mL 4 times a day as needed.      isosorbide mononitrate ER (Imdur) 30 mg 24 hr tablet Take 4 tablets (120 mg) by mouth once daily.      ISOSORBIDE MONONITRATE ORAL Take 5 tablets by mouth once daily.      lactobacillus acidophilus & bulgar (Lactinex) 1 million cell chewable tablet Use as directed      Lantus Solostar U-100 Insulin 100 unit/mL (3 mL) pen Inject 60 Units under the skin once daily at bedtime.      lidocaine-prilocaine (Emla) 2.5-2.5 % cream APPLY SMALL AMOUNT TO ACCESS SITE (AVF) 3 TIMES A WEEK 1 HOUR BEFORE DIALYSIS. COVER WITH OCCLUSIVE DRESSING (SARAN WRAP)      lisinopril 10 mg tablet Take 1 tablet (10 mg) by mouth once daily.      magnesium oxide (Mag-Ox) 400 mg (241.3 mg magnesium) tablet Take 1 tablet (400 mg) by mouth once daily at bedtime.      melatonin 10 mg tablet Take 1 tablet (10 mg) by mouth as needed at bedtime.       "melatonin 3 mg tablet Take by mouth.      metoprolol tartrate (Lopressor) 25 mg tablet Take 0.5 tablets (12.5 mg) by mouth 2 times a day.      midodrine (Proamatine) 10 mg tablet       nitroglycerin (Nitrostat) 0.4 mg SL tablet DISSOLVE 1 TABLET UNDER THE TONGUE UP TO A MAX OF 3 TOTAL DOSES. IF NO RELIEF AFTER 1 DOSE CALL 911      Novofine Autocover 30 gauge x 1/3\" needle USE FIVE TIMES A DAY WITH INSULIN      NovoLOG FlexPen U-100 Insulin 100 unit/mL (3 mL) pen Inject 12 Units under the skin. SUBCUTANEOUSLY WITH EACH MEAL      Nyamyc 100,000 unit/gram powder Apply 1 Application topically 2 times a day as needed for rash.      ondansetron ODT (Zofran-ODT) 4 mg disintegrating tablet Take 1 tablet (4 mg) by mouth 2 times a day as needed for nausea.      ranolazine (Ranexa) 500 mg 12 hr tablet Take by mouth.      sennosides (Senokot) 8.6 mg tablet Take 1 tablet (8.6 mg) by mouth once daily at bedtime.      sertraline (Zoloft) 50 mg tablet Take 1 tablet (50 mg) by mouth once daily.      sevelamer carbonate (Renvela) 800 mg tablet Take 1 tablet (800 mg) by mouth 2 times a day with meals.      Sure Comfort Pen Needle 30 gauge x 5/16\" needle       traMADol (Ultram) 50 mg tablet Take 1 tablet (50 mg) by mouth 2 times a day as needed.      triamcinolone (Kenalog) 0.1 % lotion Apply 1 Application topically once daily as needed.      vitamin E 90 mg (200 unit) capsule Take 1 capsule (200 Units) by mouth once daily. With Vit C      insulin glargine (Lantus U-100 Insulin) 100 unit/mL injection Inject 70 Units under the skin once daily at bedtime.      insulin lispro protamin-lispro (HumaLOG Mix 50-50 Insuln U-100) 100 unit/mL (50-50) injection Inject under the skin. Take as directed per insulin instructions.      isosorbide mononitrate ER (Imdur) 60 mg 24 hr tablet Take 1 tablet (60 mg) by mouth once daily.      midodrine (Proamatine) 5 mg tablet Take 1 tablet (5 mg) by mouth. On dialysis days      pantoprazole (ProtoNix) 20 mg " "EC tablet Take 1 tablet (20 mg) by mouth once daily.      predniSONE (Deltasone) 20 mg tablet Take 2 tablets (40 mg) by mouth. X 5 doses for COPD exacerbation      pregabalin (Lyrica) 75 mg capsule Take by mouth.      SITagliptin phosphate (Januvia) 25 mg tablet Take 1 tablet (25 mg) by mouth once daily.       No current facility-administered medications on file prior to visit.        Allergies:   Allergies   Allergen Reactions    Codeine Hives    Oxycodone Unknown     \"I slept for 3 days\"    Oxycodone-Acetaminophen Unknown     \"lethargic\"       SH:    Social Determinants of Health     Tobacco Use: Low Risk  (10/5/2023)    Patient History     Smoking Tobacco Use: Never     Smokeless Tobacco Use: Never     Passive Exposure: Not on file   Alcohol Use: Not on file   Financial Resource Strain: Not on file   Food Insecurity: Not on file   Transportation Needs: Not on file   Physical Activity: Not on file   Stress: Not on file   Social Connections: Not on file   Intimate Partner Violence: Not on file   Depression: Not on file   Housing Stability: Not on file   Utilities: Not on file   Digital Equity: Not on file        FH:  No family history on file.     ROS:  All systems were reviewed and are negative except as per HPI.    Objective:  Vitals:  Vitals:    10/05/23 1133   BP: (!) 93/46   Pulse: 86   Resp: 16   Temp: 36 °C (96.8 °F)   SpO2: 96%        Exam:  In NAD, well appearing  Left arm AVG has a soft thrill.    Assessment & Plan:  Dan[place] So is 63 y.o. female with history of ESRD on HD via left ax-ax AVG. I think her hypotension may be putting the graft at risk. I will get a graft duplex to ensure no issues with the graft, and talk to Dr. Duncan about her BP.    I spent a total of 30 minutes on the day of the visit.         Dusty Dover M.D.    Update 10/9/23 - I reviewed her duplex. Graft is widely patent. I called her today and she said they used the graft with no issues. I advised her to discuss increasing " her BP with her nephrologist and return for catheter removal when AVG is working well.

## 2023-10-09 ENCOUNTER — HOSPITAL ENCOUNTER (OUTPATIENT)
Dept: RADIOLOGY | Facility: HOSPITAL | Age: 63
Discharge: HOME | End: 2023-10-09
Payer: MEDICARE

## 2023-10-09 DIAGNOSIS — Z99.2 ESRD (END STAGE RENAL DISEASE) ON DIALYSIS (MULTI): ICD-10-CM

## 2023-10-09 DIAGNOSIS — Z99.2 DEPENDENCE ON HEMODIALYSIS (CMS-HCC): ICD-10-CM

## 2023-10-09 DIAGNOSIS — N18.6 ESRD (END STAGE RENAL DISEASE) ON DIALYSIS (MULTI): ICD-10-CM

## 2023-10-09 DIAGNOSIS — T82.898A OTHER SPECIFIED COMPLICATION OF VASCULAR PROSTHETIC DEVICES, IMPLANTS AND GRAFTS, INITIAL ENCOUNTER (CMS-HCC): ICD-10-CM

## 2023-10-09 PROCEDURE — 93990 DOPPLER FLOW TESTING: CPT

## 2023-10-10 ENCOUNTER — APPOINTMENT (OUTPATIENT)
Dept: ORTHOPEDIC SURGERY | Facility: CLINIC | Age: 63
End: 2023-10-10
Payer: MEDICARE

## 2023-10-11 ENCOUNTER — TELEPHONE (OUTPATIENT)
Dept: CARDIOLOGY | Facility: HOSPITAL | Age: 63
End: 2023-10-11
Payer: MEDICARE

## 2023-10-12 ENCOUNTER — HOSPITAL ENCOUNTER (OUTPATIENT)
Dept: SLEEP CENTER | Age: 63
Discharge: HOME | End: 2023-10-14

## 2023-10-13 PROCEDURE — 93990 DOPPLER FLOW TESTING: CPT | Performed by: SURGERY

## 2023-10-18 ENCOUNTER — HOSPITAL ENCOUNTER (OUTPATIENT)
Dept: WOUND CARE | Age: 63
Discharge: HOME | End: 2023-10-18

## 2023-10-19 ENCOUNTER — OFFICE VISIT (OUTPATIENT)
Dept: GASTROENTEROLOGY | Age: 63
End: 2023-10-19
Payer: MEDICAID

## 2023-10-19 VITALS — OXYGEN SATURATION: 98 % | HEART RATE: 63 BPM | BODY MASS INDEX: 32.96 KG/M2 | WEIGHT: 210 LBS | HEIGHT: 67 IN

## 2023-10-19 DIAGNOSIS — R19.7 DIARRHEA, UNSPECIFIED TYPE: Primary | ICD-10-CM

## 2023-10-19 DIAGNOSIS — D64.9 ANEMIA, UNSPECIFIED TYPE: ICD-10-CM

## 2023-10-19 PROCEDURE — 1090F PRES/ABSN URINE INCON ASSESS: CPT | Performed by: INTERNAL MEDICINE

## 2023-10-19 PROCEDURE — 1036F TOBACCO NON-USER: CPT | Performed by: INTERNAL MEDICINE

## 2023-10-19 PROCEDURE — 99214 OFFICE O/P EST MOD 30 MIN: CPT | Performed by: INTERNAL MEDICINE

## 2023-10-19 PROCEDURE — G8484 FLU IMMUNIZE NO ADMIN: HCPCS | Performed by: INTERNAL MEDICINE

## 2023-10-19 PROCEDURE — G8427 DOCREV CUR MEDS BY ELIG CLIN: HCPCS | Performed by: INTERNAL MEDICINE

## 2023-10-19 PROCEDURE — G8417 CALC BMI ABV UP PARAM F/U: HCPCS | Performed by: INTERNAL MEDICINE

## 2023-10-19 PROCEDURE — G9898 SNP/LG TRM CRE PT W/POS CDE: HCPCS | Performed by: INTERNAL MEDICINE

## 2023-10-19 PROCEDURE — G8399 PT W/DXA RESULTS DOCUMENT: HCPCS | Performed by: INTERNAL MEDICINE

## 2023-10-19 PROCEDURE — G9901 SNP/LG TRM CRE PT W/POS CDE: HCPCS | Performed by: INTERNAL MEDICINE

## 2023-10-19 PROCEDURE — 1123F ACP DISCUSS/DSCN MKR DOCD: CPT | Performed by: INTERNAL MEDICINE

## 2023-10-19 RX ORDER — LOPERAMIDE HYDROCHLORIDE 2 MG/1
2 CAPSULE ORAL PRN
Qty: 60 CAPSULE | Refills: 3 | Status: SHIPPED | OUTPATIENT
Start: 2023-10-19 | End: 2023-11-18

## 2023-10-19 RX ORDER — SODIUM, POTASSIUM,MAG SULFATES 17.5-3.13G
SOLUTION, RECONSTITUTED, ORAL ORAL
Qty: 354 ML | Refills: 0 | Status: SHIPPED | OUTPATIENT
Start: 2023-10-19

## 2023-10-19 NOTE — PROGRESS NOTES
significantly changed from 4/20/2022. Modified barium swallow 7/1/2022: Mild to moderately abnormal modified barium swallow. No tracheal aspiration identified. CT abdomen pelvis without contrast 8/20/2022: Unremarkable    Assessment and Plan:  Gio Hinkle 70 y.o. female with history of chronic diarrhea, symptoms improved on Imodium and fiber supplementation in addition to OTC and food probiotics. On Protonix for upper GI symptoms. 1. Diarrhea, unspecified type  -Functional gastrointestinal disorder versus IBS versus polypharmacy as etiology for patient's symptoms.   -Continue with lifestyle modification with stress reduction, dietary changes, Fiber supplementation and Imodium PRN    2. Anemia, unspecified type  -Mildly anemic, likely in the context of chronic disease.      - Patient due for colonoscopy, February 2024, procedure discussed at length, including the risks and benefits. Split prep was prescribed and discussed. Importance of the prep in ensuring a good exam was emphasized. - Suprep    Associated medical conditions: Recent Echo 4/25/22 EF 60%, RVSP 68, CABG LAD +TV repair complicated by tamponade and pericardioal window (2 [id number]), diastolic CHF, mitral/tricuspid valve regurgitation, echo 4/25/2022 with ejection fraction of 60% RVSP 68, non-STEMI, renal failure (on hemodialysis Tuesday/Thursday/Saturday), HTN, hepatitis C, type 2 diabetes with neuropathy, sleep apnea, COPD (not on home O2), tardive dyskinesia, schizophrenia, vitamin B12 deficiency, HLD, class II obesity, chronic pain. > 50% of 30 minutes was spent spent on counseling, coordinating care based on my plan and assessment as noted above. No follow-ups on file.     Brandon Soares MD   Staff Gastroenterology Nurse Practitioner  [facility]    Please note this report has been partially produced using speech recognition software and contain errors related to that system including grammar,

## 2023-10-25 ENCOUNTER — HOSPITAL ENCOUNTER (OUTPATIENT)
Dept: WOUND CARE | Age: 63
Discharge: HOME | End: 2023-10-25
Payer: MEDICARE

## 2023-10-25 VITALS
RESPIRATION RATE: 16 BRPM | HEART RATE: 67 BPM | SYSTOLIC BLOOD PRESSURE: 103 MMHG | TEMPERATURE: 97.8 F | DIASTOLIC BLOOD PRESSURE: 36 MMHG

## 2023-10-25 DIAGNOSIS — L30.9 ECZEMA, UNSPECIFIED TYPE: Primary | ICD-10-CM

## 2023-10-25 PROCEDURE — 99212 OFFICE O/P EST SF 10 MIN: CPT

## 2023-10-25 PROCEDURE — 99202 OFFICE O/P NEW SF 15 MIN: CPT | Performed by: FAMILY MEDICINE

## 2023-10-25 RX ORDER — TRIAMCINOLONE ACETONIDE 1 MG/G
CREAM TOPICAL
Qty: 80 G | Refills: 0 | Status: SHIPPED | OUTPATIENT
Start: 2023-10-25

## 2023-10-25 NOTE — PROGRESS NOTES
[address] [facility]   Progress Note and Procedure Note      [address] [facility] RECORD NUMBER:  [id number]  AGE: 70 y.o. GENDER: female  : 1960  EPISODE DATE:  10/25/2023    Subjective:     Chief Complaint   Patient presents with    Wound Check         HISTORY of PRESENT ILLNESS HPI     Sarah Stanton is a 70 y.o. female who presents today for wound/ulcer evaluation. History of Wound Context: Patient presents with complaint of bilateral forearm itching and rash for several months. It worsens at night. She scratches to the point where she makes sores in her arms. She has 2 pets, a cat and a dog. She has not tried any lotions or creams. She is a dialysis patient. No fever or chills. No known irritants.   Wound/Ulcer Pain Timing/Severity: mild  Quality of pain: burning  Severity:  1 / 10   Modifying Factors: Pain worsens with sores/scratches  Associated Signs/Symptoms:  itching    Ulcer Identification:  Ulcer Type:  dermatitis, excoriations  Contributing Factors: diabetes, obesity, and ESRD    Wound: N/A        PAST MEDICAL HISTORY        Diagnosis Date    Angina at rest 2020    Anxiety     CAD S/P percutaneous coronary angioplasty ,     stents per dr Newsome Brochure    CHF (congestive heart failure) ([address])     CKD (chronic kidney disease) stage 4, GFR 15-29 ml/min ([address]) 2018    CKD stage 4 due to type 2 diabetes mellitus ([address])     Contusion of right chest wall 2021    COPD (chronic obstructive pulmonary disease) ([address])     Diabetic nephropathy with proteinuria ([address])     DJD (degenerative joint disease) of knee     Dr Veronica Mujica    GERD (gastroesophageal reflux disease)     Hemiparesis, left ([address])     entered Assisted Living ([address])    Hemodialysis patient [facility])     Hemodialysis-associated hypotension 10/22/2021    History of heart failure     History of seizures     History of type C viral hepatitis     HTN (hypertension)     Hyperlipidemia

## 2023-11-02 ENCOUNTER — APPOINTMENT (OUTPATIENT)
Dept: VASCULAR SURGERY | Facility: CLINIC | Age: 63
End: 2023-11-02
Payer: MEDICARE

## 2023-11-03 ENCOUNTER — APPOINTMENT (OUTPATIENT)
Dept: VASCULAR SURGERY | Facility: CLINIC | Age: 63
End: 2023-11-03
Payer: MEDICARE

## 2023-11-03 ENCOUNTER — OFFICE VISIT (OUTPATIENT)
Dept: ORTHOPEDIC SURGERY | Facility: CLINIC | Age: 63
End: 2023-11-03
Payer: MEDICARE

## 2023-11-03 VITALS — BODY MASS INDEX: 34.72 KG/M2 | WEIGHT: 216 LBS | HEIGHT: 66 IN

## 2023-11-03 DIAGNOSIS — M54.2 NECK PAIN: Primary | ICD-10-CM

## 2023-11-03 PROCEDURE — 1036F TOBACCO NON-USER: CPT | Performed by: ORTHOPAEDIC SURGERY

## 2023-11-03 PROCEDURE — 1159F MED LIST DOCD IN RCRD: CPT | Performed by: ORTHOPAEDIC SURGERY

## 2023-11-03 PROCEDURE — 99213 OFFICE O/P EST LOW 20 MIN: CPT | Performed by: ORTHOPAEDIC SURGERY

## 2023-11-03 PROCEDURE — 99213 OFFICE O/P EST LOW 20 MIN: CPT | Mod: PO | Performed by: ORTHOPAEDIC SURGERY

## 2023-11-03 PROCEDURE — 1125F AMNT PAIN NOTED PAIN PRSNT: CPT | Performed by: ORTHOPAEDIC SURGERY

## 2023-11-03 ASSESSMENT — PAIN SCALES - GENERAL: PAINLEVEL_OUTOF10: 6

## 2023-11-03 ASSESSMENT — PAIN - FUNCTIONAL ASSESSMENT: PAIN_FUNCTIONAL_ASSESSMENT: 0-10

## 2023-11-03 NOTE — PROGRESS NOTES
Danica So is a 63 y.o. female who presents for Pain of the Neck.    HPI:  63-year-old female here for evaluation of neck pain.  She has been seeing Dr. Srivastava for her left shoulder.  She was referred by him.  She denies any fever chills nausea vomiting night sweats she has no bowel or bladder complaints.  We reviewed the note from Dr. Srivastava from September 18    Physical exam:  Well-nourished, well kept.  Patient is ambulating with a rollator walker today.  She can rise from a seated position and she can sit from a standing position.  Very bad balance issues and cannot get up on her heels or toes.  She has a slow but steady gait.  She is tender in the paraspinal musculature in the cervical spine more off to the right and into the right trapezius muscle.  Her range of motion in her neck is mildly decreased secondary to pain and stiffness no weakness and was admitted to muscle strength examination of the lower extremities reveals no point tenderness, swelling, or deformity.  Range of motion of the hips, knees, and ankles are full without crepitance, instability, or exacerbation of pain. Strength is 5/5 throughout.  Except she is got diffuse weakness throughout her upper extremity her muscles are about 4+/5 throughout I do not get any specific muscle group or laterality deficit.    Imaging studies:  We reviewed cervical x-rays from September 18, 2023.    Assessment:  63-year-old female here for evaluation of mostly right-sided neck pain.  She is not having any radiating pain down the right arm.  She is having left shoulder pain but that is been taken care of with Dr. Srivastava.  This is mostly cervical spine pain.  There is no real radicular component.  She has a very degenerative cervical spine.  This is a nonsurgical case.    Plan:    For complete plan and/or surgical details, please refer to Dr. Kearns's portion of this split dictation.      In a face-to-face encounter, I performed a history and physical  examination, discussed pertinent diagnostic studies if indicated, and discussed diagnosis and management strategies with both the patient and the midlevel provider.  I reviewed the midlevel's note and agree with the documented findings and plan of care.    Neck pain only.  Has some shoulder pain as well.  No radiculopathy.  Severe degenerative changes on the x-rays.  This is a nonsurgical problem.  We will get her into pain management.  We will get her into physical therapy.  She can follow-up with us on a as needed basis.

## 2023-11-06 DIAGNOSIS — G47.30 SLEEP DISORDER BREATHING: ICD-10-CM

## 2023-11-06 DIAGNOSIS — G47.33 OSA (OBSTRUCTIVE SLEEP APNEA): Primary | ICD-10-CM

## 2023-11-08 ENCOUNTER — TELEPHONE (OUTPATIENT)
Dept: CARDIOLOGY CLINIC | Age: 63
End: 2023-11-08

## 2023-11-08 NOTE — TELEPHONE ENCOUNTER
Patient was at dialysis and bp was dropping so DR Garry Townsend decreased isosorbide from 30mg qid to just once daily. Wanted to make sure ok with you. Pt did not know BP readings. Please advise.  Please call patient back

## 2023-11-09 ENCOUNTER — TELEPHONE (OUTPATIENT)
Dept: CARDIOLOGY CLINIC | Age: 63
End: 2023-11-09

## 2023-11-09 RX ORDER — ISOSORBIDE MONONITRATE 30 MG/1
30 TABLET, EXTENDED RELEASE ORAL DAILY
Qty: 90 TABLET | Refills: 3
Start: 2023-11-09

## 2023-11-09 NOTE — TELEPHONE ENCOUNTER
Called patient and notified her that Dr. Patricia Cabrera was made aware of the dosage change of her Imdur that was made by Dr. Doris Lazrao. Medication list updated to Imdur 30mg daily.

## 2023-11-16 ENCOUNTER — OFFICE VISIT (OUTPATIENT)
Dept: VASCULAR SURGERY | Facility: CLINIC | Age: 63
End: 2023-11-16
Payer: MEDICARE

## 2023-11-16 VITALS
TEMPERATURE: 96.2 F | WEIGHT: 221 LBS | DIASTOLIC BLOOD PRESSURE: 41 MMHG | HEART RATE: 92 BPM | OXYGEN SATURATION: 95 % | HEIGHT: 67 IN | SYSTOLIC BLOOD PRESSURE: 108 MMHG | RESPIRATION RATE: 16 BRPM | BODY MASS INDEX: 34.69 KG/M2

## 2023-11-16 DIAGNOSIS — N18.6 ESRD (END STAGE RENAL DISEASE) (MULTI): Primary | ICD-10-CM

## 2023-11-16 PROCEDURE — 1159F MED LIST DOCD IN RCRD: CPT | Performed by: SURGERY

## 2023-11-16 PROCEDURE — 1125F AMNT PAIN NOTED PAIN PRSNT: CPT | Performed by: SURGERY

## 2023-11-16 PROCEDURE — 36589 REMOVAL TUNNELED CV CATH: CPT | Performed by: SURGERY

## 2023-11-16 PROCEDURE — 1036F TOBACCO NON-USER: CPT | Performed by: SURGERY

## 2023-11-16 NOTE — PROGRESS NOTES
Patient ID: Danica So is a 63 y.o. female.    DIALYSIS CATHETER REMOVAL PROCEDURE:    Danica So presents today for catheter removal  Dialysis access in left arm has been going well.    after id/time-out/allergy verification was performed and verified with nursing:  left chest wall catheter was prepped and local 1% lidocaine was infiltrated into the wound for a total of 10cc  using a hemostat, i bluntly dissected around the entry site of the catheter to remove the cuff from the scar  after several attempts and slight retraction, I was able to remove the catheter without any issues  pressure was held on the actual venous puncture site and the exit site until there was good hemostasis.   patient tolerated well    sterile dressing was applied.

## 2023-12-04 ENCOUNTER — OFFICE VISIT (OUTPATIENT)
Dept: ENDOCRINOLOGY | Age: 63
End: 2023-12-04
Payer: MEDICARE

## 2023-12-04 VITALS
OXYGEN SATURATION: 98 % | WEIGHT: 220 LBS | HEIGHT: 67 IN | DIASTOLIC BLOOD PRESSURE: 61 MMHG | BODY MASS INDEX: 34.53 KG/M2 | HEART RATE: 73 BPM | SYSTOLIC BLOOD PRESSURE: 94 MMHG | TEMPERATURE: 97.3 F

## 2023-12-04 DIAGNOSIS — E11.65 UNCONTROLLED TYPE 2 DIABETES MELLITUS WITH HYPERGLYCEMIA (HCC): Primary | ICD-10-CM

## 2023-12-04 LAB
CHP ED QC CHECK: NORMAL
GLUCOSE BLD-MCNC: 196 MG/DL

## 2023-12-04 PROCEDURE — 3074F SYST BP LT 130 MM HG: CPT | Performed by: INTERNAL MEDICINE

## 2023-12-04 PROCEDURE — 3078F DIAST BP <80 MM HG: CPT | Performed by: INTERNAL MEDICINE

## 2023-12-04 PROCEDURE — G8417 CALC BMI ABV UP PARAM F/U: HCPCS | Performed by: INTERNAL MEDICINE

## 2023-12-04 PROCEDURE — G9901 SNP/LG TRM CRE PT W/POS CDE: HCPCS | Performed by: INTERNAL MEDICINE

## 2023-12-04 PROCEDURE — G8484 FLU IMMUNIZE NO ADMIN: HCPCS | Performed by: INTERNAL MEDICINE

## 2023-12-04 PROCEDURE — 1036F TOBACCO NON-USER: CPT | Performed by: INTERNAL MEDICINE

## 2023-12-04 PROCEDURE — 2022F DILAT RTA XM EVC RTNOPTHY: CPT | Performed by: INTERNAL MEDICINE

## 2023-12-04 PROCEDURE — 1090F PRES/ABSN URINE INCON ASSESS: CPT | Performed by: INTERNAL MEDICINE

## 2023-12-04 PROCEDURE — G8427 DOCREV CUR MEDS BY ELIG CLIN: HCPCS | Performed by: INTERNAL MEDICINE

## 2023-12-04 PROCEDURE — 1123F ACP DISCUSS/DSCN MKR DOCD: CPT | Performed by: INTERNAL MEDICINE

## 2023-12-04 PROCEDURE — 99213 OFFICE O/P EST LOW 20 MIN: CPT | Performed by: INTERNAL MEDICINE

## 2023-12-04 PROCEDURE — G9898 SNP/LG TRM CRE PT W/POS CDE: HCPCS | Performed by: INTERNAL MEDICINE

## 2023-12-04 PROCEDURE — 82962 GLUCOSE BLOOD TEST: CPT | Performed by: INTERNAL MEDICINE

## 2023-12-04 PROCEDURE — G8399 PT W/DXA RESULTS DOCUMENT: HCPCS | Performed by: INTERNAL MEDICINE

## 2023-12-04 PROCEDURE — 3051F HG A1C>EQUAL 7.0%<8.0%: CPT | Performed by: INTERNAL MEDICINE

## 2023-12-04 RX ORDER — INSULIN GLARGINE 100 [IU]/ML
INJECTION, SOLUTION SUBCUTANEOUS
Qty: 15 ADJUSTABLE DOSE PRE-FILLED PEN SYRINGE | Refills: 3 | Status: SHIPPED | OUTPATIENT
Start: 2023-12-04

## 2023-12-04 NOTE — PROGRESS NOTES
Insulin dependent, Dr Lam Franco    Uncontrolled type 2 diabetes mellitus with hyperglycemia ([address])     Urinary incontinence due to cognitive impairment     Vitamin D deficiency      Past Surgical History:   Procedure Laterality Date    AV FISTULA CREATION Left 2023    H     SECTION      x1    COLONOSCOPY  2014    Dr. Mahi Franklin      x1 Dr. Mary Lou Luis, Dr Simon Delay  08/10/2021    [facility]    DIAGNOSTIC CARDIAC CATH LAB PROCEDURE  10/02/2019    DIALYSIS CATHETER INSERTION Left 2022    Tunneled Symetrex 15.5F x 23cm hemodialysis catheter inserted by Dr. Shannon Alexis Left 2022    [id number] replamcent tunneld HD cath by Dr. Rosio Frederick, TOTAL ABDOMINAL (CERVIX REMOVED)      one ovary intact, Dr Cleve Zhang, menorrhagia    IR THROMBECTOMY PERCUT AVF  2022    IR THROMBECTOMY PERCUT AVF 2022 [facility] PROCED[facility]    IR TUNNELED CATHETER PLACEMENT GREATER THAN 5 YEARS  2022    IR TUNNELED CATHETER PLACEMENT GREATER THAN 5 YEARS 6/3/2022 [facility] PROCED[facility]    IR TUNNELED CATHETER PLACEMENT GREATER THAN 5 YEARS Left 2022 Dr. Neal Merlin exchanged to 15.5F x 28 cm.      15.5 fr by 23 cm Symetrex dialysis catheter inserted by Dr Neal Merlin    IR TUNNELED C/Casia 10 5 YEARS  2022    IR TUNNELED CATHETER PLACEMENT GREATER THAN 5 YEARS 2022 [facility] PROCEDURE    AL ARTHRP KNE CONDYLE&PLATU MEDIAL&LAT COMPARTMENTS Left 2018    LEFT KNEE TOTAL KNEE ARTHROPLASTY, SHAYNA, NERVE BLOCK performed by Marisol Blanton MD at [address] [place] [address] Right     TOTAL KNEE ARTHROPLASTY  2016    Dr Sylvain Whitney    TUNNELED [address] 2020    tunneled HD catheter per Dr Jesus Madrigal History    Marital status:      Spouse name: Not on file

## 2023-12-05 ASSESSMENT — ENCOUNTER SYMPTOMS: EYES NEGATIVE: 1

## 2023-12-06 ENCOUNTER — HOSPITAL ENCOUNTER (OUTPATIENT)
Dept: SLEEP CENTER | Age: 63
Discharge: HOME | End: 2023-12-08
Payer: MEDICARE

## 2023-12-06 PROCEDURE — 95811 POLYSOM 6/>YRS CPAP 4/> PARM: CPT

## 2023-12-07 RX ORDER — MIDODRINE HYDROCHLORIDE 10 MG/1
TABLET ORAL
Qty: 12 TABLET | Refills: 10 | Status: SHIPPED | OUTPATIENT
Start: 2023-12-07

## 2023-12-07 NOTE — TELEPHONE ENCOUNTER
Comments: pt called for refill    Last Office Visit (last PCP visit):   9/18/2023    Next Visit Date:  Future Appointments   Date Time Provider [address]   12/13/2023  1:00 PM Brenda Kendall MD [address] [place] [address]   12/18/2023  1:30 PM Zenaida Lee MD [facility]   3/4/2024  2:00 PM Richy Oconnor MD [facility]       **If hasn't been seen in over a year OR hasn't followed up according to last diabetes/ADHD visit, make appointment for patient before sending refill to provider.     Rx requested:  Requested Prescriptions     Pending Prescriptions Disp Refills    midodrine (PROAMATINE) 10 MG tablet 12 tablet 10

## 2023-12-13 ENCOUNTER — OFFICE VISIT (OUTPATIENT)
Dept: PULMONOLOGY | Age: 63
End: 2023-12-13
Payer: MEDICARE

## 2023-12-13 VITALS
TEMPERATURE: 97.6 F | HEART RATE: 67 BPM | WEIGHT: 220.02 LBS | HEIGHT: 67 IN | DIASTOLIC BLOOD PRESSURE: 64 MMHG | SYSTOLIC BLOOD PRESSURE: 102 MMHG | OXYGEN SATURATION: 93 % | BODY MASS INDEX: 34.53 KG/M2

## 2023-12-13 DIAGNOSIS — G47.33 OSA (OBSTRUCTIVE SLEEP APNEA): ICD-10-CM

## 2023-12-13 DIAGNOSIS — N18.6 END STAGE RENAL DISEASE (HCC): ICD-10-CM

## 2023-12-13 DIAGNOSIS — J45.909 REACTIVE AIRWAY DISEASE WITHOUT COMPLICATION, UNSPECIFIED ASTHMA SEVERITY, UNSPECIFIED WHETHER PERSISTENT: ICD-10-CM

## 2023-12-13 DIAGNOSIS — J98.4 RESTRICTIVE LUNG DISEASE: ICD-10-CM

## 2023-12-13 DIAGNOSIS — J84.9 ILD (INTERSTITIAL LUNG DISEASE) (HCC): Primary | ICD-10-CM

## 2023-12-13 DIAGNOSIS — R06.02 SHORTNESS OF BREATH: ICD-10-CM

## 2023-12-13 PROCEDURE — 3078F DIAST BP <80 MM HG: CPT | Performed by: INTERNAL MEDICINE

## 2023-12-13 PROCEDURE — G8427 DOCREV CUR MEDS BY ELIG CLIN: HCPCS | Performed by: INTERNAL MEDICINE

## 2023-12-13 PROCEDURE — G8399 PT W/DXA RESULTS DOCUMENT: HCPCS | Performed by: INTERNAL MEDICINE

## 2023-12-13 PROCEDURE — G8417 CALC BMI ABV UP PARAM F/U: HCPCS | Performed by: INTERNAL MEDICINE

## 2023-12-13 PROCEDURE — 1036F TOBACCO NON-USER: CPT | Performed by: INTERNAL MEDICINE

## 2023-12-13 PROCEDURE — 1090F PRES/ABSN URINE INCON ASSESS: CPT | Performed by: INTERNAL MEDICINE

## 2023-12-13 PROCEDURE — 99214 OFFICE O/P EST MOD 30 MIN: CPT | Performed by: INTERNAL MEDICINE

## 2023-12-13 PROCEDURE — G9898 SNP/LG TRM CRE PT W/POS CDE: HCPCS | Performed by: INTERNAL MEDICINE

## 2023-12-13 PROCEDURE — 3074F SYST BP LT 130 MM HG: CPT | Performed by: INTERNAL MEDICINE

## 2023-12-13 PROCEDURE — G8484 FLU IMMUNIZE NO ADMIN: HCPCS | Performed by: INTERNAL MEDICINE

## 2023-12-13 PROCEDURE — G9901 SNP/LG TRM CRE PT W/POS CDE: HCPCS | Performed by: INTERNAL MEDICINE

## 2023-12-13 PROCEDURE — 1123F ACP DISCUSS/DSCN MKR DOCD: CPT | Performed by: INTERNAL MEDICINE

## 2023-12-13 NOTE — PROGRESS NOTES
DIAGNOSTIC CARDIAC CATH LAB PROCEDURE  10/02/2019    DIALYSIS CATHETER INSERTION Left 06/03/2022    Tunneled Symetrex 15.5F x 23cm hemodialysis catheter inserted by Dr. Angella Sierra Left 07/18/2022    [id number] replamcent tunneld HD cath by Dr. Glenna Blandon, TOTAL ABDOMINAL (CERVIX REMOVED)      one ovary intact, Dr Zuñiga Nail, menorrhagia    IR THROMBECTOMY PERCUT AVF  06/06/2022    IR THROMBECTOMY PERCUT AVF 6/6/2022 [facility]ED[facility]    IR TUNNELED CATHETER PLACEMENT GREATER THAN 5 YEARS  06/03/2022    IR TUNNELED CATHETER PLACEMENT GREATER THAN 5 YEARS 6/3/2022 [facility] PROCED[facility]    IR TUNNELED CATHETER PLACEMENT GREATER THAN 5 YEARS Left 07/07/2022 7/18/22 Dr. Chapito Nassar exchanged to 15.5F x 28 cm.      15.5 fr by 23 cm Symetrex dialysis catheter inserted by Dr Chapito Nassar    IR TUNNELED C/Casia 10 5 YEARS  07/07/2022    IR TUNNELED CATHETER PLACEMENT GREATER THAN 5 YEARS 7/7/2022 [facility] PROCED[facility]    PA ARTHRP KNE CONDYLE&PLATU MEDIAL&LAT COMPARTMENTS Left 06/21/2018    LEFT KNEE TOTAL KNEE ARTHROPLASTY, SHAYNA, NERVE BLOCK performed by Priyanka Myers MD at [address] [place] [address] Right     TOTAL KNEE ARTHROPLASTY  05/19/2016    Dr Azeb Fishman    TUNNELED [address] Right 07/01/2020    tunneled HD catheter per Dr Chapito Nassar       Allergies  Allergies   Allergen Reactions    Codeine Hives     hives    Oxycontin [Oxycodone Hcl] Hives       Medications  Current Outpatient Medications   Medication Sig Dispense Refill    midodrine (PROAMATINE) 10 MG tablet TAKE 1 TABLET BY MOUTH ON DAYS OF DIALYSIS TREATMENT THREE TIMES WEEKLY 12 tablet 10    insulin glargine (LANTUS SOLOSTAR) 100 UNIT/ML injection pen 60 units at bedtime 15 Adjustable Dose Pre-filled Pen Syringe 3    isosorbide mononitrate (IMDUR) 30 MG extended release tablet Take 1 tablet by mouth daily 90 tablet 3    triamcinolone (KENALOG) 0.1 % cream Apply topically 2 times

## 2023-12-22 ENCOUNTER — HOSPITAL ENCOUNTER (OUTPATIENT)
Dept: CT IMAGING | Age: 63
Discharge: HOME | End: 2023-12-24
Attending: INTERNAL MEDICINE
Payer: MEDICARE

## 2023-12-22 DIAGNOSIS — J84.9 ILD (INTERSTITIAL LUNG DISEASE) (HCC): ICD-10-CM

## 2023-12-22 PROBLEM — G47.33 OSA (OBSTRUCTIVE SLEEP APNEA): Status: ACTIVE | Noted: 2019-11-05

## 2023-12-22 PROCEDURE — 71250 CT THORAX DX C-: CPT

## 2023-12-25 ENCOUNTER — HOSPITAL ENCOUNTER (INPATIENT)
Age: 63
LOS: 3 days | Discharge: HOME HEALTH | DRG: 091 | End: 2023-12-29
Attending: STUDENT IN AN ORGANIZED HEALTH CARE EDUCATION/TRAINING PROGRAM | Admitting: INTERNAL MEDICINE
Payer: MEDICARE

## 2023-12-25 ENCOUNTER — APPOINTMENT (OUTPATIENT)
Dept: GENERAL RADIOLOGY | Age: 63
DRG: 091 | End: 2023-12-25
Payer: MEDICARE

## 2023-12-25 DIAGNOSIS — I50.9 ACUTE DECOMPENSATED HEART FAILURE (HCC): ICD-10-CM

## 2023-12-25 DIAGNOSIS — D64.9 ANEMIA, UNSPECIFIED TYPE: ICD-10-CM

## 2023-12-25 DIAGNOSIS — R41.82 ALTERED MENTAL STATUS, UNSPECIFIED ALTERED MENTAL STATUS TYPE: Primary | ICD-10-CM

## 2023-12-25 LAB
ACANTHOCYTES BLD QL SMEAR: ABNORMAL
ALBUMIN SERPL-MCNC: 3.9 G/DL (ref 3.5–4.6)
ALP SERPL-CCNC: 100 U/L (ref 40–130)
ALT SERPL-CCNC: 23 U/L (ref 0–33)
AMMONIA PLAS-SCNC: 23 UMOL/L (ref 11–51)
ANION GAP SERPL CALCULATED.3IONS-SCNC: 14 MEQ/L (ref 9–15)
ANISOCYTOSIS BLD QL SMEAR: ABNORMAL
AST SERPL-CCNC: 36 U/L (ref 0–35)
BASOPHILS # BLD: 0 K/UL (ref 0–0.2)
BASOPHILS NFR BLD: 0.2 %
BILIRUB SERPL-MCNC: 0.7 MG/DL (ref 0.2–0.7)
BNP BLD-MCNC: NORMAL PG/ML
BUN SERPL-MCNC: 23 MG/DL (ref 8–23)
BURR CELLS: ABNORMAL
CALCIUM SERPL-MCNC: 8.6 MG/DL (ref 8.5–9.9)
CHLORIDE SERPL-SCNC: 89 MEQ/L (ref 95–107)
CO2 SERPL-SCNC: 27 MEQ/L (ref 20–31)
CREAT SERPL-MCNC: 6.45 MG/DL (ref 0.5–0.9)
EOSINOPHIL # BLD: 0.1 K/UL (ref 0–0.7)
EOSINOPHIL NFR BLD: 1 %
ERYTHROCYTE [DISTWIDTH] IN BLOOD BY AUTOMATED COUNT: 16.2 % (ref 11.5–14.5)
GLOBULIN SER CALC-MCNC: 2.9 G/DL (ref 2.3–3.5)
GLUCOSE SERPL-MCNC: 268 MG/DL (ref 70–99)
HCT VFR BLD AUTO: 21.7 % (ref 37–47)
HGB BLD-MCNC: 6.7 G/DL (ref 12–16)
INFLUENZA A BY PCR: NEGATIVE
INFLUENZA B BY PCR: NEGATIVE
LACTATE BLDV-SCNC: 2.9 MMOL/L (ref 0.5–2.2)
LYMPHOCYTES # BLD: 0.7 K/UL (ref 1–4.8)
LYMPHOCYTES NFR BLD: 8 %
MACROCYTES BLD QL SMEAR: ABNORMAL
MAGNESIUM SERPL-MCNC: 2.4 MG/DL (ref 1.7–2.4)
MCH RBC QN AUTO: 33.5 PG (ref 27–31.3)
MCHC RBC AUTO-ENTMCNC: 30.9 % (ref 33–37)
MCV RBC AUTO: 108.5 FL (ref 79.4–94.8)
MICROCYTES BLD QL SMEAR: ABNORMAL
MONOCYTES # BLD: 0 K/UL (ref 0.2–0.8)
MONOCYTES NFR BLD: 7 %
NEUTROPHILS # BLD: 7.6 K/UL (ref 1.4–6.5)
NEUTS BAND NFR BLD MANUAL: 5 % (ref 5–11)
NEUTS SEG NFR BLD: 87 %
OVALOCYTES BLD QL SMEAR: ABNORMAL
PLATELET # BLD AUTO: 78 K/UL (ref 130–400)
POIKILOCYTOSIS BLD QL SMEAR: ABNORMAL
POLYCHROMASIA BLD QL SMEAR: ABNORMAL
POTASSIUM SERPL-SCNC: 4.5 MEQ/L (ref 3.4–4.9)
PROCALCITONIN SERPL IA-MCNC: 2.22 NG/ML (ref 0–0.15)
PROT SERPL-MCNC: 6.8 G/DL (ref 6.3–8)
RBC # BLD AUTO: 2 M/UL (ref 4.2–5.4)
SARS-COV-2 RDRP RESP QL NAA+PROBE: NOT DETECTED
SMUDGE CELLS BLD QL SMEAR: 5.8
SODIUM SERPL-SCNC: 130 MEQ/L (ref 135–144)
TSH REFLEX: 1.31 UIU/ML (ref 0.44–3.86)
WBC # BLD AUTO: 8.3 K/UL (ref 4.8–10.8)

## 2023-12-25 PROCEDURE — 87502 INFLUENZA DNA AMP PROBE: CPT

## 2023-12-25 PROCEDURE — 71045 X-RAY EXAM CHEST 1 VIEW: CPT

## 2023-12-25 PROCEDURE — 87635 SARS-COV-2 COVID-19 AMP PRB: CPT

## 2023-12-25 PROCEDURE — 84145 PROCALCITONIN (PCT): CPT

## 2023-12-25 PROCEDURE — 86901 BLOOD TYPING SEROLOGIC RH(D): CPT

## 2023-12-25 PROCEDURE — 80053 COMPREHEN METABOLIC PANEL: CPT

## 2023-12-25 PROCEDURE — 36415 COLL VENOUS BLD VENIPUNCTURE: CPT

## 2023-12-25 PROCEDURE — 84295 ASSAY OF SERUM SODIUM: CPT

## 2023-12-25 PROCEDURE — 82435 ASSAY OF BLOOD CHLORIDE: CPT

## 2023-12-25 PROCEDURE — 86900 BLOOD TYPING SEROLOGIC ABO: CPT

## 2023-12-25 PROCEDURE — 82803 BLOOD GASES ANY COMBINATION: CPT

## 2023-12-25 PROCEDURE — 36600 WITHDRAWAL OF ARTERIAL BLOOD: CPT

## 2023-12-25 PROCEDURE — 83605 ASSAY OF LACTIC ACID: CPT

## 2023-12-25 PROCEDURE — 83735 ASSAY OF MAGNESIUM: CPT

## 2023-12-25 PROCEDURE — 84132 ASSAY OF SERUM POTASSIUM: CPT

## 2023-12-25 PROCEDURE — 99285 EMERGENCY DEPT VISIT HI MDM: CPT

## 2023-12-25 PROCEDURE — 85025 COMPLETE CBC W/AUTO DIFF WBC: CPT

## 2023-12-25 PROCEDURE — 83880 ASSAY OF NATRIURETIC PEPTIDE: CPT

## 2023-12-25 PROCEDURE — 93005 ELECTROCARDIOGRAM TRACING: CPT | Performed by: STUDENT IN AN ORGANIZED HEALTH CARE EDUCATION/TRAINING PROGRAM

## 2023-12-25 PROCEDURE — 82140 ASSAY OF AMMONIA: CPT

## 2023-12-25 PROCEDURE — 85014 HEMATOCRIT: CPT

## 2023-12-25 PROCEDURE — 82565 ASSAY OF CREATININE: CPT

## 2023-12-25 PROCEDURE — 87040 BLOOD CULTURE FOR BACTERIA: CPT

## 2023-12-25 PROCEDURE — 86850 RBC ANTIBODY SCREEN: CPT

## 2023-12-25 PROCEDURE — 84443 ASSAY THYROID STIM HORMONE: CPT

## 2023-12-25 PROCEDURE — 82330 ASSAY OF CALCIUM: CPT

## 2023-12-25 RX ORDER — SODIUM CHLORIDE 9 MG/ML
INJECTION, SOLUTION INTRAVENOUS PRN
Status: DISCONTINUED | OUTPATIENT
Start: 2023-12-25 | End: 2023-12-26

## 2023-12-25 ASSESSMENT — PAIN DESCRIPTION - PAIN TYPE: TYPE: ACUTE PAIN

## 2023-12-25 ASSESSMENT — PAIN DESCRIPTION - LOCATION: LOCATION: HIP

## 2023-12-25 ASSESSMENT — PAIN - FUNCTIONAL ASSESSMENT: PAIN_FUNCTIONAL_ASSESSMENT: 0-10

## 2023-12-25 ASSESSMENT — PAIN DESCRIPTION - DESCRIPTORS: DESCRIPTORS: ACHING

## 2023-12-25 ASSESSMENT — PAIN DESCRIPTION - ORIENTATION: ORIENTATION: RIGHT

## 2023-12-25 ASSESSMENT — PAIN SCALES - GENERAL: PAINLEVEL_OUTOF10: 7

## 2023-12-26 ENCOUNTER — APPOINTMENT (OUTPATIENT)
Dept: CT IMAGING | Age: 63
DRG: 091 | End: 2023-12-26
Payer: MEDICARE

## 2023-12-26 PROBLEM — I50.9 ACUTE DECOMPENSATED HEART FAILURE (HCC): Status: ACTIVE | Noted: 2023-12-26

## 2023-12-26 PROBLEM — R41.82 AMS (ALTERED MENTAL STATUS): Status: ACTIVE | Noted: 2023-12-26

## 2023-12-26 PROBLEM — J84.9 INTERSTITIAL LUNG DISEASE (HCC): Status: ACTIVE | Noted: 2023-12-26

## 2023-12-26 LAB
ABO + RH BLD: NORMAL
ALBUMIN SERPL-MCNC: 4.1 G/DL (ref 3.5–4.6)
ALP SERPL-CCNC: 105 U/L (ref 40–130)
ALT SERPL-CCNC: 22 U/L (ref 0–33)
ANION GAP SERPL CALCULATED.3IONS-SCNC: 17 MEQ/L (ref 9–15)
AST SERPL-CCNC: 29 U/L (ref 0–35)
B PARAP IS1001 DNA NPH QL NAA+NON-PROBE: NOT DETECTED
B PERT.PT PRMT NPH QL NAA+NON-PROBE: NOT DETECTED
BASE EXCESS ARTERIAL: 6 (ref -3–3)
BASOPHILS # BLD: 0 K/UL (ref 0–0.2)
BASOPHILS NFR BLD: 0.4 %
BILIRUB SERPL-MCNC: 0.7 MG/DL (ref 0.2–0.7)
BLD GP AB SCN SERPL QL: NORMAL
BNP BLD-MCNC: NORMAL PG/ML
BUN SERPL-MCNC: 25 MG/DL (ref 8–23)
C PNEUM DNA NPH QL NAA+NON-PROBE: NOT DETECTED
CALCIUM IONIZED: 1.02 MMOL/L (ref 1.12–1.32)
CALCIUM SERPL-MCNC: 8.8 MG/DL (ref 8.5–9.9)
CHLORIDE SERPL-SCNC: 89 MEQ/L (ref 95–107)
CO2 SERPL-SCNC: 28 MEQ/L (ref 20–31)
CREAT SERPL-MCNC: 6.98 MG/DL (ref 0.5–0.9)
EOSINOPHIL # BLD: 0.4 K/UL (ref 0–0.7)
EOSINOPHIL NFR BLD: 3.4 %
ERYTHROCYTE [DISTWIDTH] IN BLOOD BY AUTOMATED COUNT: 16.3 % (ref 11.5–14.5)
FLUAV RNA NPH QL NAA+NON-PROBE: NOT DETECTED
FLUBV RNA NPH QL NAA+NON-PROBE: NOT DETECTED
GLOBULIN SER CALC-MCNC: 3 G/DL (ref 2.3–3.5)
GLUCOSE BLD-MCNC: 126 MG/DL (ref 70–99)
GLUCOSE BLD-MCNC: 245 MG/DL (ref 70–99)
GLUCOSE BLD-MCNC: 296 MG/DL (ref 70–99)
GLUCOSE SERPL-MCNC: 215 MG/DL (ref 70–99)
HADV DNA NPH QL NAA+NON-PROBE: NOT DETECTED
HBV SURFACE AG SERPL QL IA: NORMAL
HCO3 ARTERIAL: 30.6 MMOL/L (ref 21–29)
HCOV 229E RNA NPH QL NAA+NON-PROBE: NOT DETECTED
HCOV HKU1 RNA NPH QL NAA+NON-PROBE: NOT DETECTED
HCOV NL63 RNA NPH QL NAA+NON-PROBE: NOT DETECTED
HCOV OC43 RNA NPH QL NAA+NON-PROBE: NOT DETECTED
HCT VFR BLD AUTO: 33.4 % (ref 37–47)
HCT VFR BLD AUTO: 33.6 % (ref 37–47)
HCT VFR BLD AUTO: 38 % (ref 36–48)
HGB BLD CALC-MCNC: 13 GM/DL (ref 12–16)
HGB BLD-MCNC: 10.9 G/DL (ref 12–16)
HGB BLD-MCNC: 11 G/DL (ref 12–16)
HMPV RNA NPH QL NAA+NON-PROBE: NOT DETECTED
HPIV1 RNA NPH QL NAA+NON-PROBE: NOT DETECTED
HPIV2 RNA NPH QL NAA+NON-PROBE: NOT DETECTED
HPIV3 RNA NPH QL NAA+NON-PROBE: NOT DETECTED
HPIV4 RNA NPH QL NAA+NON-PROBE: NOT DETECTED
LACTATE: 2.22 MMOL/L (ref 0.4–2)
LYMPHOCYTES # BLD: 1.7 K/UL (ref 1–4.8)
LYMPHOCYTES NFR BLD: 16.2 %
M PNEUMO DNA NPH QL NAA+NON-PROBE: NOT DETECTED
MAGNESIUM SERPL-MCNC: 2.5 MG/DL (ref 1.7–2.4)
MCH RBC QN AUTO: 32.9 PG (ref 27–31.3)
MCHC RBC AUTO-ENTMCNC: 32.4 % (ref 33–37)
MCV RBC AUTO: 101.5 FL (ref 79.4–94.8)
MONOCYTES # BLD: 0.7 K/UL (ref 0.2–0.8)
MONOCYTES NFR BLD: 6.9 %
NEUTROPHILS # BLD: 7.5 K/UL (ref 1.4–6.5)
NEUTS SEG NFR BLD: 72.8 %
O2 SAT, ARTERIAL: 62 % (ref 93–100)
PCO2 ARTERIAL: 48 MM HG (ref 35–45)
PERFORMED ON: ABNORMAL
PH ARTERIAL: 7.41 (ref 7.35–7.45)
PLATELET # BLD AUTO: 127 K/UL (ref 130–400)
PO2 ARTERIAL: 32 MM HG (ref 75–108)
POC CHLORIDE: 96 MEQ/L (ref 99–110)
POC CREATININE: 6 MG/DL (ref 0.6–1.2)
POC SAMPLE TYPE: ABNORMAL
POTASSIUM SERPL-SCNC: 3.8 MEQ/L (ref 3.5–5.1)
POTASSIUM SERPL-SCNC: 3.9 MEQ/L (ref 3.4–4.9)
PROCALCITONIN SERPL IA-MCNC: 3.86 NG/ML (ref 0–0.15)
PROT SERPL-MCNC: 7.1 G/DL (ref 6.3–8)
RBC # BLD AUTO: 3.31 M/UL (ref 4.2–5.4)
RSV RNA NPH QL NAA+NON-PROBE: NOT DETECTED
RV+EV RNA NPH QL NAA+NON-PROBE: NOT DETECTED
SARS-COV-2 RNA NPH QL NAA+NON-PROBE: NOT DETECTED
SODIUM BLD-SCNC: 132 MEQ/L (ref 136–145)
SODIUM SERPL-SCNC: 134 MEQ/L (ref 135–144)
TCO2 ARTERIAL: 32 MMOL/L (ref 21–32)
WBC # BLD AUTO: 10.3 K/UL (ref 4.8–10.8)

## 2023-12-26 PROCEDURE — 6370000000 HC RX 637 (ALT 250 FOR IP): Performed by: NURSE PRACTITIONER

## 2023-12-26 PROCEDURE — 6360000002 HC RX W HCPCS: Performed by: NURSE PRACTITIONER

## 2023-12-26 PROCEDURE — APPSS45 APP SPLIT SHARED TIME 31-45 MINUTES: Performed by: NURSE PRACTITIONER

## 2023-12-26 PROCEDURE — 6370000000 HC RX 637 (ALT 250 FOR IP): Performed by: FAMILY MEDICINE

## 2023-12-26 PROCEDURE — 94660 CPAP INITIATION&MGMT: CPT

## 2023-12-26 PROCEDURE — 2580000003 HC RX 258: Performed by: NURSE PRACTITIONER

## 2023-12-26 PROCEDURE — 1210000000 HC MED SURG R&B

## 2023-12-26 PROCEDURE — 6370000000 HC RX 637 (ALT 250 FOR IP): Performed by: INTERNAL MEDICINE

## 2023-12-26 PROCEDURE — 99222 1ST HOSP IP/OBS MODERATE 55: CPT | Performed by: PSYCHIATRY & NEUROLOGY

## 2023-12-26 PROCEDURE — 84145 PROCALCITONIN (PCT): CPT

## 2023-12-26 PROCEDURE — 83880 ASSAY OF NATRIURETIC PEPTIDE: CPT

## 2023-12-26 PROCEDURE — 83735 ASSAY OF MAGNESIUM: CPT

## 2023-12-26 PROCEDURE — 0202U NFCT DS 22 TRGT SARS-COV-2: CPT

## 2023-12-26 PROCEDURE — 5A1D70Z PERFORMANCE OF URINARY FILTRATION, INTERMITTENT, LESS THAN 6 HOURS PER DAY: ICD-10-PCS | Performed by: INTERNAL MEDICINE

## 2023-12-26 PROCEDURE — 99223 1ST HOSP IP/OBS HIGH 75: CPT | Performed by: INTERNAL MEDICINE

## 2023-12-26 PROCEDURE — 85018 HEMOGLOBIN: CPT

## 2023-12-26 PROCEDURE — 5A09457 ASSISTANCE WITH RESPIRATORY VENTILATION, 24-96 CONSECUTIVE HOURS, CONTINUOUS POSITIVE AIRWAY PRESSURE: ICD-10-PCS | Performed by: INTERNAL MEDICINE

## 2023-12-26 PROCEDURE — 36415 COLL VENOUS BLD VENIPUNCTURE: CPT

## 2023-12-26 PROCEDURE — 86706 HEP B SURFACE ANTIBODY: CPT

## 2023-12-26 PROCEDURE — 80053 COMPREHEN METABOLIC PANEL: CPT

## 2023-12-26 PROCEDURE — 85014 HEMATOCRIT: CPT

## 2023-12-26 PROCEDURE — 85025 COMPLETE CBC W/AUTO DIFF WBC: CPT

## 2023-12-26 PROCEDURE — 74176 CT ABD & PELVIS W/O CONTRAST: CPT

## 2023-12-26 PROCEDURE — 87340 HEPATITIS B SURFACE AG IA: CPT

## 2023-12-26 RX ORDER — HEPARIN SODIUM 5000 [USP'U]/ML
5000 INJECTION, SOLUTION INTRAVENOUS; SUBCUTANEOUS EVERY 8 HOURS SCHEDULED
Status: DISCONTINUED | OUTPATIENT
Start: 2023-12-26 | End: 2023-12-28

## 2023-12-26 RX ORDER — METOPROLOL TARTRATE 25 MG/1
12.5 TABLET, FILM COATED ORAL 2 TIMES DAILY
Status: DISCONTINUED | OUTPATIENT
Start: 2023-12-26 | End: 2023-12-29 | Stop reason: HOSPADM

## 2023-12-26 RX ORDER — ATORVASTATIN CALCIUM 40 MG/1
40 TABLET, FILM COATED ORAL NIGHTLY
Status: DISCONTINUED | OUTPATIENT
Start: 2023-12-26 | End: 2023-12-29 | Stop reason: HOSPADM

## 2023-12-26 RX ORDER — SODIUM CHLORIDE 0.9 % (FLUSH) 0.9 %
5-40 SYRINGE (ML) INJECTION PRN
Status: DISCONTINUED | OUTPATIENT
Start: 2023-12-26 | End: 2023-12-29 | Stop reason: HOSPADM

## 2023-12-26 RX ORDER — MIDODRINE HYDROCHLORIDE 5 MG/1
10 TABLET ORAL PRN
Status: DISCONTINUED | OUTPATIENT
Start: 2023-12-26 | End: 2023-12-29 | Stop reason: HOSPADM

## 2023-12-26 RX ORDER — ASPIRIN 81 MG/1
81 TABLET ORAL DAILY
Status: DISCONTINUED | OUTPATIENT
Start: 2023-12-26 | End: 2023-12-29 | Stop reason: HOSPADM

## 2023-12-26 RX ORDER — SODIUM CHLORIDE 0.9 % (FLUSH) 0.9 %
5-40 SYRINGE (ML) INJECTION EVERY 12 HOURS SCHEDULED
Status: DISCONTINUED | OUTPATIENT
Start: 2023-12-26 | End: 2023-12-29 | Stop reason: HOSPADM

## 2023-12-26 RX ORDER — ONDANSETRON 2 MG/ML
4 INJECTION INTRAMUSCULAR; INTRAVENOUS EVERY 6 HOURS PRN
Status: DISCONTINUED | OUTPATIENT
Start: 2023-12-26 | End: 2023-12-29 | Stop reason: HOSPADM

## 2023-12-26 RX ORDER — ACETAMINOPHEN 650 MG/1
650 SUPPOSITORY RECTAL EVERY 6 HOURS PRN
Status: DISCONTINUED | OUTPATIENT
Start: 2023-12-26 | End: 2023-12-29 | Stop reason: HOSPADM

## 2023-12-26 RX ORDER — ACETAMINOPHEN 325 MG/1
650 TABLET ORAL EVERY 6 HOURS PRN
Status: DISCONTINUED | OUTPATIENT
Start: 2023-12-26 | End: 2023-12-29 | Stop reason: HOSPADM

## 2023-12-26 RX ORDER — SODIUM CHLORIDE 9 MG/ML
INJECTION, SOLUTION INTRAVENOUS PRN
Status: DISCONTINUED | OUTPATIENT
Start: 2023-12-26 | End: 2023-12-29 | Stop reason: HOSPADM

## 2023-12-26 RX ORDER — ONDANSETRON 4 MG/1
4 TABLET, ORALLY DISINTEGRATING ORAL EVERY 8 HOURS PRN
Status: DISCONTINUED | OUTPATIENT
Start: 2023-12-26 | End: 2023-12-29 | Stop reason: HOSPADM

## 2023-12-26 RX ORDER — MECOBALAMIN 5000 MCG
5 TABLET,DISINTEGRATING ORAL NIGHTLY
Status: DISCONTINUED | OUTPATIENT
Start: 2023-12-26 | End: 2023-12-29 | Stop reason: HOSPADM

## 2023-12-26 RX ORDER — ISOSORBIDE MONONITRATE 60 MG/1
30 TABLET, EXTENDED RELEASE ORAL DAILY
Status: DISCONTINUED | OUTPATIENT
Start: 2023-12-26 | End: 2023-12-29 | Stop reason: HOSPADM

## 2023-12-26 RX ORDER — POLYETHYLENE GLYCOL 3350 17 G/17G
17 POWDER, FOR SOLUTION ORAL DAILY PRN
Status: DISCONTINUED | OUTPATIENT
Start: 2023-12-26 | End: 2023-12-29 | Stop reason: HOSPADM

## 2023-12-26 RX ADMIN — ONDANSETRON 4 MG: 2 INJECTION INTRAMUSCULAR; INTRAVENOUS at 21:34

## 2023-12-26 RX ADMIN — HEPARIN SODIUM 5000 UNITS: 5000 INJECTION INTRAVENOUS; SUBCUTANEOUS at 21:34

## 2023-12-26 RX ADMIN — SODIUM CHLORIDE, PRESERVATIVE FREE 10 ML: 5 INJECTION INTRAVENOUS at 21:37

## 2023-12-26 RX ADMIN — ONDANSETRON 4 MG: 2 INJECTION INTRAMUSCULAR; INTRAVENOUS at 10:37

## 2023-12-26 RX ADMIN — ATORVASTATIN CALCIUM 40 MG: 40 TABLET, FILM COATED ORAL at 21:34

## 2023-12-26 RX ADMIN — Medication 5 MG: at 22:11

## 2023-12-26 RX ADMIN — SODIUM CHLORIDE, PRESERVATIVE FREE 10 ML: 5 INJECTION INTRAVENOUS at 08:10

## 2023-12-26 RX ADMIN — ACETAMINOPHEN 650 MG: 325 TABLET ORAL at 10:37

## 2023-12-26 RX ADMIN — SERTRALINE 50 MG: 50 TABLET, FILM COATED ORAL at 21:34

## 2023-12-26 RX ADMIN — HEPARIN SODIUM 5000 UNITS: 5000 INJECTION INTRAVENOUS; SUBCUTANEOUS at 13:26

## 2023-12-26 RX ADMIN — METOPROLOL TARTRATE 12.5 MG: 25 TABLET, FILM COATED ORAL at 21:34

## 2023-12-26 RX ADMIN — ONDANSETRON 4 MG: 4 TABLET, ORALLY DISINTEGRATING ORAL at 02:35

## 2023-12-26 ASSESSMENT — PAIN SCALES - GENERAL: PAINLEVEL_OUTOF10: 0

## 2023-12-26 NOTE — ED NOTES
This RN assumed care of pt   Pt presents with c/o hypotension, AMS, and hip pain. Per family, pt is more altered than usual. Pt took muscle relaxer and tramadol PTA and was increasingly lethargic. Pt is a known HD pt and is set to have therapy tomorrow. Pt his generalized edema. Pt AxOx3. Pt in NAD at this time. Respirations even and unlabored. Pt resting comfortably in bed. Per pt and family- there are two dead HD fistulas in the R arm and only 1 access fistula in the L arm. Pt is on RA- vitals WNL.

## 2023-12-26 NOTE — ED PROVIDER NOTES
condition which required my urgent intervention. FINAL IMPRESSION      1. Altered mental status, unspecified altered mental status type    2. Anemia, unspecified type    3.  Acute decompensated heart failure [facility])          DISPOSITION/PLAN   DISPOSITION Admitted 12/26/2023 12:36:02 AM      Current Discharge Medication List           MD Ketty Gordon MD  12/26/23 0850

## 2023-12-26 NOTE — CONSENT
Informed Consent for Blood Component Transfusion Note    I have discussed with the patient the rationale for blood component transfusion; its benefits in treating or preventing fatigue, organ damage, or death; and its risk which includes mild transfusion reactions, rare risk of blood borne infection, or more serious but rare reactions. I have discussed the alternatives to transfusion, including the risk and consequences of not receiving transfusion. The patient had an opportunity to ask questions and had agreed to proceed with transfusion of blood components.     Electronically signed by Deja Guerin MD on 12/26/23 at 12:31 AM EST

## 2023-12-26 NOTE — CARE COORDINATION
Definition of CHF discussed with patient. Patient admitted to CTN that she does not really watch her diet and does not always comply with a low sodium diet. She states she does not weigh herself daily either. When I asked the patient why, she states because they do all that at dialysis. CTN encouraged patient to watch for signs and symptoms of CHF even though she is getting dialysis. Patient states she will try to do that if she can. Symptoms of heart failure and decompensation reviewed: weight gain of >3 #, edema, difficulty breathing, cough, issues with appetite, fatigue, or difficulty with sleep. Common causes of CHF reviewed: CAD, arrhythmias, MI, HTN, valve dz., infection, ETOH or drug abuse, or genetic abnormalities. Importance of daily weight and B/P monitoring discussed. Pt to use a calender or notebook to record daily weight and call physician immediately with 3 # weight gain. Low sodium diet and fluid intake discussed. Pt taught about a fluid restriction and advised to discuss this with the cardiologist prior to limiting oral intake. Shown how to read labels for sodium levels, recommended food list provided. I emphasized the importance of following their physician's orders for medication administration. Importance of flu and pneumonia vaccinations reinforced. Common CHF medications reviewed as well as avoiding certain other meds (decongestants, NSAIDS)  Instructed to discuss activity recommendations with physician. Pt. Denies smoking. Smoking cessation discussed with patient. 1-800-quit-now number provided. Sample CHF weight documentation form provided. CHF Zones discussed. Importance of staying in \"green\" area stressed. Pt verbalized understanding to call MD ASAP when she reaches the yellow zone, and to call 911 when reaching the red zone. Booklet and zone pamphlet provided to the pt. Patient denies any further questions at this time.

## 2023-12-26 NOTE — CARE COORDINATION
Case Management Assessment  Initial Evaluation    Date/Time of Evaluation: 12/26/2023 11:12 AM  Assessment Completed by: Princess Arriola RN    If patient is discharged prior to next notation, then this note serves as note for discharge by case management. Patient Name: Kavon Andrews                   YOB: 1960  Diagnosis: Acute decompensated heart failure ([address]) [I50.9]  Altered mental status, unspecified altered mental status type [R41.82]  Anemia, unspecified type [D64.9]  AMS (altered mental status) [R41.82]                   Date / Time: 12/25/2023  9:51 PM    Patient Admission Status: Inpatient   Readmission Risk (Low < 19, Mod (19-27), High > 27): Readmission Risk Score: 18.4    Current PCP: William Ortega MD  PCP verified by CM? Yes    Chart Reviewed: Yes      History Provided by: Patient  Patient Orientation: Alert and Oriented, Person, Place, Situation, Self    Patient Cognition: Alert    Hospitalization in the last 30 days (Readmission):  No    If yes, Readmission Assessment in CM Navigator will be completed. Advance Directives:      Code Status: Full Code   Patient's Primary Decision Maker is: Named in [facility]    Primary Decision MakerBurlene Bowels - Child - [phone]    Discharge Planning:    Patient lives with: Family Members Type of Home: House  Primary Care Giver: Family (DAUGHTER)  Patient Support Systems include: Children   Current Financial resources:    Current community resources:    Current services prior to admission: None            Current DME:              Type of Home Care services:  None    ADLS  Prior functional level: Assistance with the following:, Bathing, Dressing, Cooking, Housework  Current functional level: Assistance with the following:, Bathing, Dressing, Cooking, Housework    PT AM-PAC:   /24  OT AM-PAC:   /24    Family can provide assistance at DC:  Yes  Would you like Case Management to discuss the discharge plan with any other

## 2023-12-27 ENCOUNTER — APPOINTMENT (OUTPATIENT)
Dept: CT IMAGING | Age: 63
DRG: 091 | End: 2023-12-27
Payer: MEDICARE

## 2023-12-27 LAB
ALBUMIN SERPL-MCNC: 3.9 G/DL (ref 3.5–4.6)
ALP SERPL-CCNC: 91 U/L (ref 40–130)
ALT SERPL-CCNC: 20 U/L (ref 0–33)
ANION GAP SERPL CALCULATED.3IONS-SCNC: 16 MEQ/L (ref 9–15)
AST SERPL-CCNC: 27 U/L (ref 0–35)
BASOPHILS # BLD: 0.1 K/UL (ref 0–0.2)
BASOPHILS NFR BLD: 0.4 %
BILIRUB SERPL-MCNC: 0.9 MG/DL (ref 0.2–0.7)
BUN SERPL-MCNC: 19 MG/DL (ref 8–23)
CALCIUM SERPL-MCNC: 8.5 MG/DL (ref 8.5–9.9)
CHLORIDE SERPL-SCNC: 89 MEQ/L (ref 95–107)
CO2 SERPL-SCNC: 27 MEQ/L (ref 20–31)
CREAT SERPL-MCNC: 5.17 MG/DL (ref 0.5–0.9)
EKG ATRIAL RATE: 258 BPM
EKG P AXIS: 111 DEGREES
EKG Q-T INTERVAL: 474 MS
EKG QRS DURATION: 134 MS
EKG QTC CALCULATION (BAZETT): 489 MS
EKG R AXIS: -66 DEGREES
EKG T AXIS: 112 DEGREES
EKG VENTRICULAR RATE: 64 BPM
EOSINOPHIL # BLD: 0.1 K/UL (ref 0–0.7)
EOSINOPHIL NFR BLD: 0.9 %
ERYTHROCYTE [DISTWIDTH] IN BLOOD BY AUTOMATED COUNT: 16.5 % (ref 11.5–14.5)
GLOBULIN SER CALC-MCNC: 2.8 G/DL (ref 2.3–3.5)
GLUCOSE BLD-MCNC: 154 MG/DL (ref 70–99)
GLUCOSE BLD-MCNC: 180 MG/DL (ref 70–99)
GLUCOSE BLD-MCNC: 191 MG/DL (ref 70–99)
GLUCOSE BLD-MCNC: 200 MG/DL (ref 70–99)
GLUCOSE SERPL-MCNC: 164 MG/DL (ref 70–99)
HBA1C MFR BLD: 6.4 % (ref 4.8–5.9)
HBV SURFACE AB TITR SER: >1000 MIU/ML
HCT VFR BLD AUTO: 34.2 % (ref 37–47)
HGB BLD-MCNC: 11.3 G/DL (ref 12–16)
LYMPHOCYTES # BLD: 1.6 K/UL (ref 1–4.8)
LYMPHOCYTES NFR BLD: 11.4 %
MAGNESIUM SERPL-MCNC: 2.1 MG/DL (ref 1.7–2.4)
MCH RBC QN AUTO: 33.2 PG (ref 27–31.3)
MCHC RBC AUTO-ENTMCNC: 33 % (ref 33–37)
MCV RBC AUTO: 100.6 FL (ref 79.4–94.8)
MONOCYTES # BLD: 0.8 K/UL (ref 0.2–0.8)
MONOCYTES NFR BLD: 5.4 %
NEUTROPHILS # BLD: 11.4 K/UL (ref 1.4–6.5)
NEUTS SEG NFR BLD: 81.5 %
PERFORMED ON: ABNORMAL
PLATELET # BLD AUTO: 142 K/UL (ref 130–400)
POTASSIUM SERPL-SCNC: 4.3 MEQ/L (ref 3.4–4.9)
PROCALCITONIN SERPL IA-MCNC: 4.47 NG/ML (ref 0–0.15)
PROT SERPL-MCNC: 6.7 G/DL (ref 6.3–8)
RBC # BLD AUTO: 3.4 M/UL (ref 4.2–5.4)
SODIUM SERPL-SCNC: 132 MEQ/L (ref 135–144)
WBC # BLD AUTO: 14 K/UL (ref 4.8–10.8)

## 2023-12-27 PROCEDURE — APPSS15 APP SPLIT SHARED TIME 0-15 MINUTES: Performed by: NURSE PRACTITIONER

## 2023-12-27 PROCEDURE — 80053 COMPREHEN METABOLIC PANEL: CPT

## 2023-12-27 PROCEDURE — 6370000000 HC RX 637 (ALT 250 FOR IP): Performed by: INTERNAL MEDICINE

## 2023-12-27 PROCEDURE — 6360000002 HC RX W HCPCS: Performed by: NURSE PRACTITIONER

## 2023-12-27 PROCEDURE — 70450 CT HEAD/BRAIN W/O DYE: CPT

## 2023-12-27 PROCEDURE — 93010 ELECTROCARDIOGRAM REPORT: CPT | Performed by: INTERNAL MEDICINE

## 2023-12-27 PROCEDURE — 84145 PROCALCITONIN (PCT): CPT

## 2023-12-27 PROCEDURE — 94660 CPAP INITIATION&MGMT: CPT

## 2023-12-27 PROCEDURE — 2580000003 HC RX 258: Performed by: NURSE PRACTITIONER

## 2023-12-27 PROCEDURE — 99223 1ST HOSP IP/OBS HIGH 75: CPT | Performed by: INTERNAL MEDICINE

## 2023-12-27 PROCEDURE — 85025 COMPLETE CBC W/AUTO DIFF WBC: CPT

## 2023-12-27 PROCEDURE — 99233 SBSQ HOSP IP/OBS HIGH 50: CPT | Performed by: INTERNAL MEDICINE

## 2023-12-27 PROCEDURE — 83036 HEMOGLOBIN GLYCOSYLATED A1C: CPT

## 2023-12-27 PROCEDURE — 36415 COLL VENOUS BLD VENIPUNCTURE: CPT

## 2023-12-27 PROCEDURE — 99232 SBSQ HOSP IP/OBS MODERATE 35: CPT | Performed by: PSYCHIATRY & NEUROLOGY

## 2023-12-27 PROCEDURE — 6370000000 HC RX 637 (ALT 250 FOR IP): Performed by: NURSE PRACTITIONER

## 2023-12-27 PROCEDURE — 6370000000 HC RX 637 (ALT 250 FOR IP): Performed by: FAMILY MEDICINE

## 2023-12-27 PROCEDURE — 1210000000 HC MED SURG R&B

## 2023-12-27 PROCEDURE — 83735 ASSAY OF MAGNESIUM: CPT

## 2023-12-27 RX ORDER — DEXTROSE MONOHYDRATE 100 MG/ML
INJECTION, SOLUTION INTRAVENOUS CONTINUOUS PRN
Status: DISCONTINUED | OUTPATIENT
Start: 2023-12-27 | End: 2023-12-29 | Stop reason: HOSPADM

## 2023-12-27 RX ORDER — GLUCAGON 1 MG/ML
1 KIT INJECTION PRN
Status: DISCONTINUED | OUTPATIENT
Start: 2023-12-27 | End: 2023-12-29 | Stop reason: HOSPADM

## 2023-12-27 RX ORDER — INSULIN LISPRO 100 [IU]/ML
0-4 INJECTION, SOLUTION INTRAVENOUS; SUBCUTANEOUS
Status: DISCONTINUED | OUTPATIENT
Start: 2023-12-27 | End: 2023-12-27

## 2023-12-27 RX ORDER — INSULIN LISPRO 100 [IU]/ML
0-4 INJECTION, SOLUTION INTRAVENOUS; SUBCUTANEOUS NIGHTLY
Status: DISCONTINUED | OUTPATIENT
Start: 2023-12-27 | End: 2023-12-27

## 2023-12-27 RX ORDER — FAMOTIDINE 20 MG/1
20 TABLET, FILM COATED ORAL 2 TIMES DAILY
Status: DISCONTINUED | OUTPATIENT
Start: 2023-12-27 | End: 2023-12-29 | Stop reason: HOSPADM

## 2023-12-27 RX ORDER — INSULIN LISPRO 100 [IU]/ML
0-8 INJECTION, SOLUTION INTRAVENOUS; SUBCUTANEOUS
Status: DISCONTINUED | OUTPATIENT
Start: 2023-12-27 | End: 2023-12-29 | Stop reason: HOSPADM

## 2023-12-27 RX ORDER — INSULIN LISPRO 100 [IU]/ML
0-4 INJECTION, SOLUTION INTRAVENOUS; SUBCUTANEOUS NIGHTLY
Status: DISCONTINUED | OUTPATIENT
Start: 2023-12-27 | End: 2023-12-29 | Stop reason: HOSPADM

## 2023-12-27 RX ADMIN — FAMOTIDINE 20 MG: 20 TABLET ORAL at 02:18

## 2023-12-27 RX ADMIN — FAMOTIDINE 20 MG: 20 TABLET ORAL at 21:05

## 2023-12-27 RX ADMIN — ASPIRIN 81 MG: 81 TABLET, COATED ORAL at 08:23

## 2023-12-27 RX ADMIN — SERTRALINE 50 MG: 50 TABLET, FILM COATED ORAL at 21:05

## 2023-12-27 RX ADMIN — HEPARIN SODIUM 5000 UNITS: 5000 INJECTION INTRAVENOUS; SUBCUTANEOUS at 15:07

## 2023-12-27 RX ADMIN — SODIUM CHLORIDE, PRESERVATIVE FREE 10 ML: 5 INJECTION INTRAVENOUS at 21:07

## 2023-12-27 RX ADMIN — Medication 5 MG: at 21:05

## 2023-12-27 RX ADMIN — ONDANSETRON 4 MG: 4 TABLET, ORALLY DISINTEGRATING ORAL at 17:37

## 2023-12-27 RX ADMIN — FAMOTIDINE 20 MG: 20 TABLET ORAL at 08:23

## 2023-12-27 RX ADMIN — HEPARIN SODIUM 5000 UNITS: 5000 INJECTION INTRAVENOUS; SUBCUTANEOUS at 21:05

## 2023-12-27 RX ADMIN — HEPARIN SODIUM 5000 UNITS: 5000 INJECTION INTRAVENOUS; SUBCUTANEOUS at 06:22

## 2023-12-27 RX ADMIN — ATORVASTATIN CALCIUM 40 MG: 40 TABLET, FILM COATED ORAL at 21:05

## 2023-12-27 RX ADMIN — ACETAMINOPHEN 650 MG: 325 TABLET ORAL at 06:22

## 2023-12-27 RX ADMIN — SODIUM CHLORIDE, PRESERVATIVE FREE 10 ML: 5 INJECTION INTRAVENOUS at 08:25

## 2023-12-27 ASSESSMENT — PAIN SCALES - GENERAL: PAINLEVEL_OUTOF10: 6

## 2023-12-27 ASSESSMENT — PAIN DESCRIPTION - DESCRIPTORS: DESCRIPTORS: ACHING

## 2023-12-28 PROBLEM — L03.114 CELLULITIS OF LEFT HAND: Status: ACTIVE | Noted: 2023-12-28

## 2023-12-28 PROBLEM — M86.68 CHRONIC OSTEOMYELITIS OF KNEE (HCC): Status: ACTIVE | Noted: 2023-12-28

## 2023-12-28 LAB
ALBUMIN SERPL-MCNC: 4 G/DL (ref 3.5–4.6)
ALP SERPL-CCNC: 97 U/L (ref 40–130)
ALT SERPL-CCNC: 21 U/L (ref 0–33)
ANION GAP SERPL CALCULATED.3IONS-SCNC: 18 MEQ/L (ref 9–15)
AST SERPL-CCNC: 27 U/L (ref 0–35)
BASOPHILS # BLD: 0.1 K/UL (ref 0–0.2)
BASOPHILS NFR BLD: 0.7 %
BILIRUB SERPL-MCNC: 1 MG/DL (ref 0.2–0.7)
BUN SERPL-MCNC: 31 MG/DL (ref 8–23)
CALCIUM SERPL-MCNC: 9 MG/DL (ref 8.5–9.9)
CHLORIDE SERPL-SCNC: 87 MEQ/L (ref 95–107)
CO2 SERPL-SCNC: 25 MEQ/L (ref 20–31)
CREAT SERPL-MCNC: 6.82 MG/DL (ref 0.5–0.9)
EOSINOPHIL # BLD: 0.2 K/UL (ref 0–0.7)
EOSINOPHIL NFR BLD: 2.1 %
ERYTHROCYTE [DISTWIDTH] IN BLOOD BY AUTOMATED COUNT: 16.6 % (ref 11.5–14.5)
GLOBULIN SER CALC-MCNC: 3.1 G/DL (ref 2.3–3.5)
GLUCOSE BLD-MCNC: 165 MG/DL (ref 70–99)
GLUCOSE BLD-MCNC: 166 MG/DL (ref 70–99)
GLUCOSE BLD-MCNC: 194 MG/DL (ref 70–99)
GLUCOSE SERPL-MCNC: 154 MG/DL (ref 70–99)
HCT VFR BLD AUTO: 35.6 % (ref 37–47)
HGB BLD-MCNC: 12.3 G/DL (ref 12–16)
LYMPHOCYTES # BLD: 2.2 K/UL (ref 1–4.8)
LYMPHOCYTES NFR BLD: 21 %
MAGNESIUM SERPL-MCNC: 2.2 MG/DL (ref 1.7–2.4)
MCH RBC QN AUTO: 33.9 PG (ref 27–31.3)
MCHC RBC AUTO-ENTMCNC: 34.6 % (ref 33–37)
MCV RBC AUTO: 98.1 FL (ref 79.4–94.8)
MONOCYTES # BLD: 0.7 K/UL (ref 0.2–0.8)
MONOCYTES NFR BLD: 6.5 %
NEUTROPHILS # BLD: 7.3 K/UL (ref 1.4–6.5)
NEUTS SEG NFR BLD: 69.5 %
PERFORMED ON: ABNORMAL
PLATELET # BLD AUTO: 159 K/UL (ref 130–400)
POTASSIUM SERPL-SCNC: 4.5 MEQ/L (ref 3.4–4.9)
PROT SERPL-MCNC: 7.1 G/DL (ref 6.3–8)
RBC # BLD AUTO: 3.63 M/UL (ref 4.2–5.4)
SODIUM SERPL-SCNC: 130 MEQ/L (ref 135–144)
WBC # BLD AUTO: 10.5 K/UL (ref 4.8–10.8)

## 2023-12-28 PROCEDURE — 94660 CPAP INITIATION&MGMT: CPT

## 2023-12-28 PROCEDURE — 6360000002 HC RX W HCPCS: Performed by: INTERNAL MEDICINE

## 2023-12-28 PROCEDURE — 87070 CULTURE OTHR SPECIMN AEROBIC: CPT

## 2023-12-28 PROCEDURE — 83735 ASSAY OF MAGNESIUM: CPT

## 2023-12-28 PROCEDURE — 2580000003 HC RX 258: Performed by: INTERNAL MEDICINE

## 2023-12-28 PROCEDURE — 99222 1ST HOSP IP/OBS MODERATE 55: CPT | Performed by: INTERNAL MEDICINE

## 2023-12-28 PROCEDURE — 6370000000 HC RX 637 (ALT 250 FOR IP): Performed by: INTERNAL MEDICINE

## 2023-12-28 PROCEDURE — 6360000002 HC RX W HCPCS: Performed by: NURSE PRACTITIONER

## 2023-12-28 PROCEDURE — 80053 COMPREHEN METABOLIC PANEL: CPT

## 2023-12-28 PROCEDURE — 99233 SBSQ HOSP IP/OBS HIGH 50: CPT | Performed by: INTERNAL MEDICINE

## 2023-12-28 PROCEDURE — 85025 COMPLETE CBC W/AUTO DIFF WBC: CPT

## 2023-12-28 PROCEDURE — 6370000000 HC RX 637 (ALT 250 FOR IP): Performed by: NURSE PRACTITIONER

## 2023-12-28 PROCEDURE — 36415 COLL VENOUS BLD VENIPUNCTURE: CPT

## 2023-12-28 PROCEDURE — 1210000000 HC MED SURG R&B

## 2023-12-28 PROCEDURE — 99232 SBSQ HOSP IP/OBS MODERATE 35: CPT | Performed by: INTERNAL MEDICINE

## 2023-12-28 PROCEDURE — 6370000000 HC RX 637 (ALT 250 FOR IP): Performed by: FAMILY MEDICINE

## 2023-12-28 PROCEDURE — 2580000003 HC RX 258: Performed by: NURSE PRACTITIONER

## 2023-12-28 RX ORDER — AMOXICILLIN 500 MG/1
500 CAPSULE ORAL EVERY 12 HOURS SCHEDULED
Status: DISCONTINUED | OUTPATIENT
Start: 2023-12-28 | End: 2023-12-29 | Stop reason: HOSPADM

## 2023-12-28 RX ORDER — AMOXICILLIN 500 MG/1
500 CAPSULE ORAL 2 TIMES DAILY
Qty: 60 CAPSULE | Refills: 5 | Status: SHIPPED | OUTPATIENT
Start: 2023-12-28 | End: 2024-01-27

## 2023-12-28 RX ORDER — NEOMYCIN/BACITRACIN/POLYMYXINB 3.5-400-5K
OINTMENT (GRAM) TOPICAL DAILY
Status: DISCONTINUED | OUTPATIENT
Start: 2023-12-29 | End: 2023-12-29 | Stop reason: HOSPADM

## 2023-12-28 RX ORDER — SULFAMETHOXAZOLE AND TRIMETHOPRIM 800; 160 MG/1; MG/1
1 TABLET ORAL DAILY
Qty: 7 TABLET | Refills: 0 | Status: SHIPPED | OUTPATIENT
Start: 2023-12-28 | End: 2024-01-04

## 2023-12-28 RX ADMIN — AMOXICILLIN 500 MG: 500 CAPSULE ORAL at 20:45

## 2023-12-28 RX ADMIN — ACETAMINOPHEN 650 MG: 325 TABLET ORAL at 00:20

## 2023-12-28 RX ADMIN — ASPIRIN 81 MG: 81 TABLET, COATED ORAL at 12:14

## 2023-12-28 RX ADMIN — SODIUM CHLORIDE, PRESERVATIVE FREE 10 ML: 5 INJECTION INTRAVENOUS at 20:24

## 2023-12-28 RX ADMIN — ONDANSETRON 4 MG: 4 TABLET, ORALLY DISINTEGRATING ORAL at 16:39

## 2023-12-28 RX ADMIN — SERTRALINE 50 MG: 50 TABLET, FILM COATED ORAL at 20:45

## 2023-12-28 RX ADMIN — APIXABAN 2.5 MG: 2.5 TABLET, FILM COATED ORAL at 20:45

## 2023-12-28 RX ADMIN — VANCOMYCIN HYDROCHLORIDE 1250 MG: 1.25 INJECTION, POWDER, LYOPHILIZED, FOR SOLUTION INTRAVENOUS at 20:46

## 2023-12-28 RX ADMIN — FAMOTIDINE 20 MG: 20 TABLET ORAL at 20:45

## 2023-12-28 RX ADMIN — ONDANSETRON 4 MG: 2 INJECTION INTRAMUSCULAR; INTRAVENOUS at 08:21

## 2023-12-28 RX ADMIN — Medication 1 MG: at 12:15

## 2023-12-28 RX ADMIN — SODIUM CHLORIDE, PRESERVATIVE FREE 10 ML: 5 INJECTION INTRAVENOUS at 08:22

## 2023-12-28 RX ADMIN — ATORVASTATIN CALCIUM 40 MG: 40 TABLET, FILM COATED ORAL at 20:45

## 2023-12-28 RX ADMIN — FAMOTIDINE 20 MG: 20 TABLET ORAL at 08:21

## 2023-12-28 RX ADMIN — HEPARIN SODIUM 5000 UNITS: 5000 INJECTION INTRAVENOUS; SUBCUTANEOUS at 05:14

## 2023-12-28 RX ADMIN — Medication 5 MG: at 20:45

## 2023-12-28 ASSESSMENT — PAIN SCALES - GENERAL: PAINLEVEL_OUTOF10: 0

## 2023-12-28 NOTE — CARE COORDINATION
Met with pt at bedside. DC plan remains home w/resumption of [facility] aide services. Call placed to [facility]- updated on plan. [facility] states they did not receive resumption orders faxed 12/27. Attempt to refax resumption orders. [phone]. Pt denies further needs. Pt states she is current with [facility] for outpt therapies as well.

## 2023-12-28 NOTE — CONSULTS
[address] [facility] Neurology Consult Note  Name: Catherine Escalante  Age: 70 y.o. Gender: female  CodeStatus: Full Code  Allergies: Codeine  Oxycontin [Oxycodone Hcl]    Chief Complaint:Hypotension (Dialysis Tues,Thurs, sat. Pt confused after taking Tramadol and a muscle relaxer, unsure of name. Poor historian and multiple complaints), Altered Mental Status, and Hip Pain (Right hip pain)    Primary Care Provider: Yovani Foreman MD  InpatientTreatment Team: Treatment Team: Attending Provider: Iveth Mei MD; Consulting Physician: Suhas David MD; Consulting Physician: Iveth Mei MD; Registered Nurse: Evelyn Early, FLAQUITO; Respiratory Therapist (Day): Mague Willett RCP; Utilization Reviewer: Fanta Winters RN; Consulting Physician: Kerry Ley MD; Consulting Physician: Chelly Ritchie MD  Admission Date: 12/25/2023      HPI   Consulting provider:Dr Iveth Mei for encephalopathy  Pt seen and examined for neurology consult. Patient is 26-year-old  female with past medical history of diabetes mellitus type 2, schizophrenia, hyperlipidemia, hypertension, end-stage renal disease on dialysis, sleep apnea on CPAP, seizures, hepatitis C, COPD, CVA, cervical canal stenosis, CHF who presented to [facility] on 12/25/2023  with reports of mental status changes and generalized weakness. Patient was intermittently hypotensive in the emergency room. Laboratory testing was remarkable for sodium of 130, creatinine 6.45, procalcitonin of 2.22, lactic acid of 2.9, hemoglobin of 6.7, platelets of 78. Repeat CBC was done without any intervention and showed hemoglobin of 10.9 and platelets of 972. Also noted to have pulmonary edema and elevated BNP. Daughter reports the patient had taken a Flexeril and tramadol in the morning prior to presentation. CT of the head was negative for any acute findings  ABGs with a noted O2 sat of 62% and pO2 of 32 with pCO2 of 48 and normal pH.     Patient
Infectious Diseases Inpatient Consult Note      Reason for Consult:   Leukocytosis and elevated procalcitonin  Requesting Physician:   Dr. Chuck Dailey  Primary Care Physician:  Gonzalo Viramontes MD  History Obtained From:   Pt, EPIC    Admit Date: 12/25/2023  Hospital Day: 4      HISTORY OF PRESENT ILLNESS:  This is a 70 y.o. female with complicated past medical history is detailed below, was admitted to [facility]  from home through ER on December 26 with confusion and generalized weakness. Patient tramadol and Flexeril for neck pain prior to admission. Patient has chronic shortness of breath and chronic diarrhea. Patient has chronic left knee osteomyelitis/prosthetic joint infection with Enterococcus faecalis and is supposed to be on chronic suppressive therapy with p.o. amoxicillin. She reports that she does not like to take her antibiotics. Wants to be switched to Bactrim. Patient has chronic left knee deformity. She has multiple wounds. She reports that her left hand wound is infected.      Past medical surgical and social history were reviewed and are as detailed below    Past Medical History:   Diagnosis Date    Angina at rest 5/24/2020    Anxiety     CAD S/P percutaneous coronary angioplasty 2015, 2018    stents per dr Clayton Ramos    CHF (congestive heart failure) ([address])     CKD (chronic kidney disease) stage 4, GFR 15-29 ml/min ([address]) 2/24/2018    CKD stage 4 due to type 2 diabetes mellitus ([address])     Contusion of right chest wall 2/16/2021    COPD (chronic obstructive pulmonary disease) ([address])     Diabetic nephropathy with proteinuria ([address]) 2014    DJD (degenerative joint disease) of knee     Dr Remington Reyes    GERD (gastroesophageal reflux disease)     Hemiparesis, left ([address]) 2013    entered Assisted Living ([address])    Hemodialysis patient [facility])     Hemodialysis-associated hypotension 10/22/2021    History of heart failure     History of seizures     History of type C viral hepatitis     HTN
Pulmonary Medicine  Consult Note      Reason for consultation: Possible interstitial lung disease    Consulting physician: Dr. Klaus Hunt:    This is a 42-year-old female with end-stage renal disease on hemodialysis, coronary artery disease, congestive heart failure, diabetes mellitus, interstitial lung disease, obstructive sleep apnea, paranoid schizophrenia and hyperlipidemia who was presented to ER with with confusion weak. According to daughter patient had complained of neck neck pain and took tramadol in the morning and then Flexeril at 4 PM.. Patient had never taken tramadol and Flexeril before. She has no fever or chills no chest pain. No cough or sputum production. No no nausea vomiting. Currently She has been having dialysis. She has chronic shortness of breath which has not changed anything. Patient is going to have more than 2 L of fluid removed during dialysis. Pulmonary consulted regarding interstitial lung disease. She had a CT chest done on 12/22/2023 reported Cardiomegaly with trace interstitial edema pattern demonstrating septal thickening may be intermixed with early fibrotic change elements subpleural reticulation and band formation appearing in the periphery without obvious  honeycombing. Trace to small left pleural effusion of likely mixed findings  without suspicious nodule or mass. She has been following Dr. Valentine Said as outpatient she also had a PFT shows restrictive ventilatory defect and diffusion capacity severely and poor total lung capacity is moderately reduced.           Past Medical History:        Diagnosis Date    Angina at rest 5/24/2020    Anxiety     CAD S/P percutaneous coronary angioplasty 2015, 2018    stents per dr Max Viera    CHF (congestive heart failure) ([address])     CKD (chronic kidney disease) stage 4, GFR 15-29 ml/min ([address]) 2/24/2018    CKD stage 4 due to type 2 diabetes mellitus (HCC)     Contusion of right chest wall
12    blood glucose test strips (FREESTYLE LITE) strip 1 each by Does not apply route 4 times daily (before meals and nightly) As needed. 200 strip 5    acetaminophen (TYLENOL) 325 MG tablet Take 2 tablets by mouth every 4 hours as needed for Pain (For mild to moderate pain (Pain 1-6 out of 10 on pain scale)) 120 tablet 0    Blood Glucose Monitoring Suppl (FREESTYLE LITE) CORETTA 1 Device by Does not apply route daily as needed (Diabetes) Use freestyle meter to test blood sugar as needed 1 Device 0       Allergies:  Codeine and Oxycontin [oxycodone hcl]    Social History:    Social History     Socioeconomic History    Marital status:      Spouse name: Not on file    Number of children: 2    Years of education: Not on file    Highest education level: Not on file   Occupational History    Occupation: disabled   Tobacco Use    Smoking status: Never     Passive exposure: Never    Smokeless tobacco: Never   Vaping Use    Vaping Use: Never used   Substance and Sexual Activity    Alcohol use: No     Alcohol/week: 0.0 standard drinks of alcohol    Drug use: No    Sexual activity: Not Currently   Other Topics Concern    Not on file   Social History Narrative    Disabled dt depression and low back pain, lives in [facility]    Dtr Kurt Jackson is close by and is her paid caregiver via waiver services    HD via [employer], Heather, Estelita, Sat. Son is in the home and has CP and is total care-and is also cared for by a waiver by Tiffanie Cooper.     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
irregular rhythm present. Murmur heard. Pulmonary/Chest: Effort normal and breath sounds normal. She has no wheezes. She has no rales. She exhibits no tenderness. Abdominal: Soft. Bowel sounds are normal. There is no abdominal tenderness. Musculoskeletal:         General: Edema present. No tenderness. Normal range of motion. Cervical back: Normal range of motion and neck supple. Neurological: She is alert and oriented to person, place, and time. Skin: Skin is warm. No cyanosis. Nails show no clubbing. Results/ Medications reviewed 12/27/2023, 5:54 PM     Laboratory, Microbiology, Pathology, Radiology, Cardiology, Medications and Transcriptions reviewed  Scheduled Meds:   famotidine  20 mg Oral BID    insulin lispro  0-8 Units SubCUTAneous TID WC    insulin lispro  0-4 Units SubCUTAneous Nightly    sodium chloride flush  5-40 mL IntraVENous 2 times per day    heparin (porcine)  5,000 Units SubCUTAneous 3 times per day    aspirin EC  81 mg Oral Daily    atorvastatin  40 mg Oral Nightly    b complex-C-folic acid  1 capsule Oral Daily    isosorbide mononitrate  30 mg Oral Daily    metoprolol tartrate  12.5 mg Oral BID    sertraline  50 mg Oral Nightly    melatonin  5 mg Oral Nightly     Continuous Infusions:   dextrose      sodium chloride         Recent Labs     12/25/23 2230 12/25/23 2356 12/26/23  0045 12/26/23 0439 12/27/23  0540   WBC 8.3  --   --  10.3 14.0*   HGB 6.7*   < > 11.0* 10.9* 11.3*   HCT 21.7*  --  33.4* 33.6* 34.2*   .5*  --   --  101.5* 100.6*   PLT 78*  --   --  127* 142    < > = values in this interval not displayed.      Recent Labs     12/25/23 2230 12/25/23 2356 12/26/23 0439 12/27/23  0540   *  --  134* 132*   K 4.5  --  3.9 4.3   CL 89*  --  89* 89*   CO2 27  --  28 27   BUN 23  --  25* 19   CREATININE 6.45* 6.0* 6.98* 5.17*     Recent Labs     12/25/23 2230 12/26/23 0439 12/27/23  0540   AST 36* 29 27   ALT 23 22 20   BILITOT 0.7 0.7 0.9*   ALKPHOS
Statement Selected

## 2023-12-29 ENCOUNTER — TELEPHONE (OUTPATIENT)
Dept: PULMONOLOGY | Age: 63
End: 2023-12-29

## 2023-12-29 VITALS
SYSTOLIC BLOOD PRESSURE: 122 MMHG | TEMPERATURE: 98.3 F | RESPIRATION RATE: 19 BRPM | OXYGEN SATURATION: 96 % | WEIGHT: 214.29 LBS | HEART RATE: 89 BPM | DIASTOLIC BLOOD PRESSURE: 69 MMHG | HEIGHT: 67 IN | BODY MASS INDEX: 33.63 KG/M2

## 2023-12-29 LAB
GLUCOSE BLD-MCNC: 165 MG/DL (ref 70–99)
PERFORMED ON: ABNORMAL

## 2023-12-29 PROCEDURE — 6370000000 HC RX 637 (ALT 250 FOR IP): Performed by: INTERNAL MEDICINE

## 2023-12-29 PROCEDURE — 99232 SBSQ HOSP IP/OBS MODERATE 35: CPT | Performed by: INTERNAL MEDICINE

## 2023-12-29 PROCEDURE — 6370000000 HC RX 637 (ALT 250 FOR IP): Performed by: NURSE PRACTITIONER

## 2023-12-29 PROCEDURE — 2580000003 HC RX 258: Performed by: NURSE PRACTITIONER

## 2023-12-29 PROCEDURE — 6360000002 HC RX W HCPCS: Performed by: NURSE PRACTITIONER

## 2023-12-29 PROCEDURE — 99231 SBSQ HOSP IP/OBS SF/LOW 25: CPT | Performed by: INTERNAL MEDICINE

## 2023-12-29 PROCEDURE — 6370000000 HC RX 637 (ALT 250 FOR IP): Performed by: FAMILY MEDICINE

## 2023-12-29 RX ADMIN — AMOXICILLIN 500 MG: 500 CAPSULE ORAL at 09:35

## 2023-12-29 RX ADMIN — ASPIRIN 81 MG: 81 TABLET, COATED ORAL at 09:34

## 2023-12-29 RX ADMIN — METOPROLOL TARTRATE 12.5 MG: 25 TABLET, FILM COATED ORAL at 09:34

## 2023-12-29 RX ADMIN — SODIUM CHLORIDE, PRESERVATIVE FREE 10 ML: 5 INJECTION INTRAVENOUS at 09:35

## 2023-12-29 RX ADMIN — ONDANSETRON 4 MG: 4 TABLET, ORALLY DISINTEGRATING ORAL at 13:02

## 2023-12-29 RX ADMIN — FAMOTIDINE 20 MG: 20 TABLET ORAL at 09:35

## 2023-12-29 RX ADMIN — ISOSORBIDE MONONITRATE 30 MG: 60 TABLET, EXTENDED RELEASE ORAL at 09:34

## 2023-12-29 RX ADMIN — ONDANSETRON 4 MG: 2 INJECTION INTRAMUSCULAR; INTRAVENOUS at 06:18

## 2023-12-29 RX ADMIN — APIXABAN 2.5 MG: 2.5 TABLET, FILM COATED ORAL at 09:34

## 2023-12-29 RX ADMIN — BACITRACIN ZINC, NEOMYCIN SULFATE, POLYMYXIN B SULFATE 1 G: 3.5; 5000; 4 OINTMENT TOPICAL at 05:54

## 2023-12-29 NOTE — TELEPHONE ENCOUNTER
Can we change her appointment so she can follow-up after being on the machine for few weeks.  She is hospitalized today.  Thanks

## 2023-12-29 NOTE — DISCHARGE SUMMARY
medication, and follow the directions you see here. CONTINUE taking these medications      acetaminophen 325 MG tablet  Commonly known as: TYLENOL  Take 2 tablets by mouth every 4 hours as needed for Pain (For mild to moderate pain (Pain 1-6 out of 10 on pain scale))     albuterol 2.5 MG/0.5ML Nebu nebulizer solution  Commonly known as: PROVENTIL     ARIPiprazole 5 MG tablet  Commonly known as: ABILIFY  TAKE 1 TABLET BY MOUTH ONCE DAILY     aspirin EC 81 MG EC tablet  Take 1 tablet by mouth daily     atorvastatin 40 MG tablet  Commonly known as: LIPITOR  Take 1 tablet by mouth nightly     b complex-C-folic acid 1 MG capsule     Easy Touch Alcohol Prep Medium 70 % Pads  USE AS DIRECTED THREE TIMES A DAY     fluconazole 150 MG tablet  Commonly known as: DIFLUCAN     FreeStyle Joselyn 2 [employer] 1      FreeStyle Joselyn 2 Sensor Misc  Replace every 14 days E11.65     * FreeStyle Lite [place]  1 Device by Does not apply route daily as needed (Diabetes) Use freestyle meter to test blood sugar as needed     * OneTouch Verio w/Device Kit  AS DIRECTED     * OneTouch Verio Reflect w/Device Kit  Give 1 meter dx E11.65     * FREESTYLE LITE strip  Generic drug: blood glucose test strips  1 each by Does not apply route 4 times daily (before meals and nightly) As needed. * OneTouch Verio strip  Generic drug: blood glucose test strips  QID     * blood glucose test strips strip  Commonly known as: ASCENSIA AUTODISC VI;ONE TOUCH ULTRA TEST VI  1 each by In Vitro route daily As needed. * FREESTYLE LITE strip  Generic drug: blood glucose test strips  Use three time daily to test BS E11.65     Handicap Placard Misc  by Does not apply route Expiration in 5 years.      hydrOXYzine HCl 25 MG tablet  Commonly known as: ATARAX     * insulin lispro 100 UNIT/ML injection vial  Commonly known as: HUMALOG     * insulin lispro 100 UNIT/ML injection vial  Commonly known as: HUMALOG     * insulin lispro 100 UNIT/ML

## 2023-12-29 NOTE — CARE COORDINATION
Met with pt at bedside to discuss discharge planning. Pt plans to return home with prior services from [facility] in place and family. Per bedside nurse daughter working till 4pm and can pick pt up after. New home care orders for SN and aide faxed to [facility] with update of pt discharge today.

## 2023-12-30 LAB — BACTERIA SPEC ANAEROBE+AEROBE CULT: NORMAL

## 2023-12-31 LAB
BACTERIA BLD CULT ORG #2: NORMAL
BACTERIA BLD CULT: NORMAL

## 2024-01-03 ENCOUNTER — CLINICAL DOCUMENTATION ONLY (OUTPATIENT)
Facility: CLINIC | Age: 64
End: 2024-01-03

## 2024-01-04 ENCOUNTER — CLINICAL DOCUMENTATION ONLY (OUTPATIENT)
Facility: CLINIC | Age: 64
End: 2024-01-04

## 2024-01-05 ENCOUNTER — TELEPHONE (OUTPATIENT)
Dept: WOUND CARE | Age: 64
End: 2024-01-05

## 2024-01-05 ENCOUNTER — APPOINTMENT (OUTPATIENT)
Dept: ORTHOPEDIC SURGERY | Facility: CLINIC | Age: 64
End: 2024-01-05
Payer: MEDICARE

## 2024-01-09 ENCOUNTER — PREP FOR PROCEDURE (OUTPATIENT)
Dept: GASTROENTEROLOGY | Age: 64
End: 2024-01-09

## 2024-01-10 RX ORDER — SODIUM CHLORIDE 0.9 % (FLUSH) 0.9 %
5-40 SYRINGE (ML) INJECTION PRN
Status: CANCELLED | OUTPATIENT
Start: 2024-01-10

## 2024-01-10 RX ORDER — SODIUM CHLORIDE 9 MG/ML
INJECTION, SOLUTION INTRAVENOUS CONTINUOUS
Status: CANCELLED | OUTPATIENT
Start: 2024-01-10

## 2024-01-10 RX ORDER — SODIUM CHLORIDE 0.9 % (FLUSH) 0.9 %
5-40 SYRINGE (ML) INJECTION EVERY 12 HOURS SCHEDULED
Status: CANCELLED | OUTPATIENT
Start: 2024-01-10

## 2024-01-10 RX ORDER — SODIUM CHLORIDE 9 MG/ML
INJECTION, SOLUTION INTRAVENOUS PRN
Status: CANCELLED | OUTPATIENT
Start: 2024-01-10

## 2024-01-15 ENCOUNTER — TELEPHONE (OUTPATIENT)
Dept: CARDIOLOGY CLINIC | Age: 64
End: 2024-01-15

## 2024-01-15 NOTE — TELEPHONE ENCOUNTER
Pt daughter was in the hospital over christmas she was put on eliqus for a-fib. Pt is on dialysis and having trouble with the two meds together. She is having trouble clotting after dialysis and she is getting a lot of bruises. Pt daughter states when she ends dialysis they can't get her to stop bleeding thru the port.

## 2024-01-22 ENCOUNTER — APPOINTMENT (OUTPATIENT)
Dept: OTOLARYNGOLOGY | Facility: CLINIC | Age: 64
End: 2024-01-22
Payer: MEDICARE

## 2024-01-31 NOTE — TELEPHONE ENCOUNTER
Requesting medication refill. Please approve or deny this request.    Rx requested:  Requested Prescriptions     Pending Prescriptions Disp Refills    apixaban (ELIQUIS) 2.5 MG TABS tablet 60 tablet 0     Sig: Take 1 tablet by mouth 2 times daily         Last Office Visit:   12/18/2023      Next Visit Date:  Future Appointments   Date Time Provider Department Center   2/5/2024  2:15 PM Morteza Avitia MD [facility]   2/7/2024  2:15 PM Carlos Alberto Woods MD [facility]   2/26/2024  1:15 PM Gabriella Almazan MD [facility]   3/4/2024  2:00 PM Andrei Nino MD [facility]   4/22/2024  2:00 PM Morteza Avitia MD [facility]

## 2024-02-02 ENCOUNTER — OFFICE VISIT (OUTPATIENT)
Dept: ORTHOPEDIC SURGERY | Facility: CLINIC | Age: 64
End: 2024-02-02
Payer: MEDICARE

## 2024-02-02 ENCOUNTER — CLINICAL SUPPORT (OUTPATIENT)
Dept: ORTHOPEDIC SURGERY | Facility: CLINIC | Age: 64
End: 2024-02-02
Payer: MEDICARE

## 2024-02-02 DIAGNOSIS — M25.562 LEFT KNEE PAIN, UNSPECIFIED CHRONICITY: Primary | ICD-10-CM

## 2024-02-02 DIAGNOSIS — M25.562 LEFT KNEE PAIN, UNSPECIFIED CHRONICITY: ICD-10-CM

## 2024-02-02 PROCEDURE — 73562 X-RAY EXAM OF KNEE 3: CPT | Mod: LEFT SIDE | Performed by: ORTHOPAEDIC SURGERY

## 2024-02-02 PROCEDURE — 1159F MED LIST DOCD IN RCRD: CPT | Performed by: ORTHOPAEDIC SURGERY

## 2024-02-02 PROCEDURE — 1036F TOBACCO NON-USER: CPT | Performed by: ORTHOPAEDIC SURGERY

## 2024-02-02 PROCEDURE — 1125F AMNT PAIN NOTED PAIN PRSNT: CPT | Performed by: ORTHOPAEDIC SURGERY

## 2024-02-02 PROCEDURE — 99213 OFFICE O/P EST LOW 20 MIN: CPT | Performed by: ORTHOPAEDIC SURGERY

## 2024-02-02 ASSESSMENT — PAIN - FUNCTIONAL ASSESSMENT: PAIN_FUNCTIONAL_ASSESSMENT: 0-10

## 2024-02-02 ASSESSMENT — PAIN SCALES - GENERAL: PAINLEVEL_OUTOF10: 7

## 2024-02-03 NOTE — PROGRESS NOTES
History of present illness    64-year-old female seen previously but have not seen in over a year and a half I did her total knee replacement sounds and she ended up with an infection and had treatment at the [facility] she comes in today complaining of swelling and pain in her left knee she denies any trauma denies any fevers or chills denies any redness or drainage.  She is not a great historian not quite sure what happened with her but I did have the opportunity to review her charts and what was available through the [facility] and sounds like she had a spacer placed and she is on chronic suppressive antibiotics.  She is dialysis dependent at this point      Past medical , Surgical, Family and social history reviewed.      Physical exam  General: No acute distress and breathing comfortably.  Patient is pleasant and cooperative with the examination.    Extremity  Left knee has a well-healed midline incision with moderate swelling as compared to the right knee she has got pretty good range of motion no pain with passive range of motion there is no redness no drainage and not in any warmth calf is nontender Homans' sign is negative    Diagnostics      XR knee left 3 views    Result Date: 2/2/2024  Interpreted By:  Norma Rodriguez, STUDY: XR KNEE LEFT 3 VIEWS;  ;  2/2/2024 10:05 am   INDICATION: Signs/Symptoms:Pain.   ACCESSION NUMBER(S): [id number]   ORDERING CLINICIAN: NORMA RODRIGUEZ   FINDINGS: Left knee three views. 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  The natural  history of the condition and its associated treatment alternatives including surgical and nonsurgical options were discussed with the patient at length.  2.  I reassured her there is nothing emergent going on with her left knee I did review her x-rays she has a cemented stemmed femoral component and what looks like an all poly tibia with cement and rebar on the tibial side and not sure if the plan was to use this as a permanent articulated spacer or if there were plans for taking her back for full revision but I recommend she follow back up with her surgeon at the [facility] and reassured to the structure everything looks okay at this point in time.  3. [   ]  4.  All of the patient's questions were answered.    Orders Placed This Encounter    Referral to Orthopaedic Surgery       This note was prepared using voice recognition software.  The details of this note are correct and have been reviewed, and corrected to the best of my ability.  Some grammatical areas may persist related to the Dragon software    Yuri Rodriguez MD  Senior Attending Physician  [facility]  Orth[facility] [place]    [phone]

## 2024-02-07 ENCOUNTER — OFFICE VISIT (OUTPATIENT)
Dept: INFECTIOUS DISEASES | Age: 64
End: 2024-02-07

## 2024-02-07 VITALS
DIASTOLIC BLOOD PRESSURE: 50 MMHG | SYSTOLIC BLOOD PRESSURE: 88 MMHG | HEIGHT: 67 IN | TEMPERATURE: 97.5 F | RESPIRATION RATE: 16 BRPM | WEIGHT: 217 LBS | BODY MASS INDEX: 34.06 KG/M2 | HEART RATE: 68 BPM

## 2024-02-07 DIAGNOSIS — N18.6 END-STAGE RENAL DISEASE ON HEMODIALYSIS (HCC): ICD-10-CM

## 2024-02-07 DIAGNOSIS — Z99.2 END-STAGE RENAL DISEASE ON HEMODIALYSIS (HCC): ICD-10-CM

## 2024-02-07 DIAGNOSIS — A49.8 ENTEROCOCCUS FAECALIS INFECTION: Primary | ICD-10-CM

## 2024-02-07 DIAGNOSIS — L03.114 CELLULITIS OF LEFT HAND: ICD-10-CM

## 2024-02-07 ASSESSMENT — PATIENT HEALTH QUESTIONNAIRE - PHQ9
1. LITTLE INTEREST OR PLEASURE IN DOING THINGS: 0
SUM OF ALL RESPONSES TO PHQ9 QUESTIONS 1 & 2: 0
SUM OF ALL RESPONSES TO PHQ QUESTIONS 1-9: 0
2. FEELING DOWN, DEPRESSED OR HOPELESS: 0
SUM OF ALL RESPONSES TO PHQ QUESTIONS 1-9: 0

## 2024-02-07 NOTE — PROGRESS NOTES
7/18/22 Dr. White exchanged to 15.5F x 28 cm.      15.5 fr by 23 cm Symetrex dialysis catheter inserted by Dr White    IR TUNNELED CATHETER PLACEMENT GREATER THAN 5 YEARS  07/07/2022    IR TUNNELED CATHETER PLACEMENT GREATER THAN 5 YEARS 7/7/2022 [facility] PROCEDURE    DC ARTHRP KNE CONDYLE&PLATU MEDIAL&LAT COMPARTMENTS Left 06/21/2018    LEFT KNEE TOTAL KNEE ARTHROPLASTY, SHAYNA, NERVE BLOCK performed by Milad Sharma MD at [facility]    PTCA      TOE AMPUTATION Right     TOTAL KNEE ARTHROPLASTY  05/19/2016    Dr Sharma    TUNNELED VENOUS CATHETER PLACEMENT Right 07/01/2020    tunneled HD catheter per Dr White     14 system review is negative other than HPI    Physical Exam  Vitals:    02/07/24 1417   BP: (!) 88/50   Site: Right Upper Arm   Position: Sitting   Cuff Size: Medium Adult   Pulse: 68   Resp: 16   Temp: 97.5 °F (36.4 °C)   Weight: 98.4 kg (217 lb)   Height: 1.702 m (5' 7\")     General Appearance: alert and oriented to person, place and time, well-developed and well-nourished, in no acute distress  Skin: Has multiple skin lesions, left hand with mild erythema, small superficial skin ulcer with  Head: normocephalic and atraumatic  Eyes: pupils equal, round, and reactive to light,extraocular eye movements intact, conjunctivae normal, anicteric sclerae  ENT:  normal mucous membranes. No thrush  Lungs: normal respiratory effort, diminished breath sounds bilateral lung fields  Heart normal S1-S2 well hydrated, no rash   abdomen: soft, positive hepatomegaly  NEUROLOGICAL: alert and oriented x 3, no focal deficits  No leg edema  Left knee is more swollen than right no warmth no redness      DATA:    Lab Results   Component Value Date    WBC 10.5 12/28/2023    HGB 12.3 12/28/2023    HCT 35.6 (L) 12/28/2023    MCV 98.1 (H) 12/28/2023     12/28/2023     Lab Results   Component Value Date    CREATININE 6.82 (HH) 12/28/2023    BUN 31 (H) 12/28/2023     (L) 12/28/2023    K 4.5 12/28/2023

## 2024-02-12 ENCOUNTER — CLINICAL DOCUMENTATION ONLY (OUTPATIENT)
Facility: CLINIC | Age: 64
End: 2024-02-12

## 2024-02-23 ENCOUNTER — OFFICE VISIT (OUTPATIENT)
Dept: OTOLARYNGOLOGY | Facility: CLINIC | Age: 64
End: 2024-02-23
Payer: MEDICARE

## 2024-02-23 ENCOUNTER — CLINICAL SUPPORT (OUTPATIENT)
Dept: AUDIOLOGY | Facility: CLINIC | Age: 64
End: 2024-02-23
Payer: MEDICARE

## 2024-02-23 VITALS — BODY MASS INDEX: 33.94 KG/M2 | WEIGHT: 216.27 LBS | HEIGHT: 67 IN

## 2024-02-23 DIAGNOSIS — H90.3 BILATERAL SENSORINEURAL HEARING LOSS: Primary | ICD-10-CM

## 2024-02-23 DIAGNOSIS — H90.3 BILATERAL SENSORINEURAL HEARING LOSS: ICD-10-CM

## 2024-02-23 DIAGNOSIS — H69.90 DYSFUNCTION OF EUSTACHIAN TUBE, UNSPECIFIED LATERALITY: Primary | ICD-10-CM

## 2024-02-23 DIAGNOSIS — H69.91 DYSFUNCTION OF RIGHT EUSTACHIAN TUBE: ICD-10-CM

## 2024-02-23 PROCEDURE — 92567 TYMPANOMETRY: CPT | Performed by: AUDIOLOGIST

## 2024-02-23 PROCEDURE — 99203 OFFICE O/P NEW LOW 30 MIN: CPT | Performed by: OTOLARYNGOLOGY

## 2024-02-23 PROCEDURE — 1036F TOBACCO NON-USER: CPT | Performed by: OTOLARYNGOLOGY

## 2024-02-23 PROCEDURE — 1125F AMNT PAIN NOTED PAIN PRSNT: CPT | Performed by: OTOLARYNGOLOGY

## 2024-02-23 PROCEDURE — 1159F MED LIST DOCD IN RCRD: CPT | Performed by: OTOLARYNGOLOGY

## 2024-02-23 PROCEDURE — 92557 COMPREHENSIVE HEARING TEST: CPT | Performed by: AUDIOLOGIST

## 2024-02-23 RX ORDER — FLUTICASONE PROPIONATE 50 MCG
2 SPRAY, SUSPENSION (ML) NASAL DAILY
Qty: 15.8 ML | Refills: 1 | Status: SHIPPED | OUTPATIENT
Start: 2024-02-23 | End: 2024-03-24

## 2024-02-23 NOTE — PROGRESS NOTES
Danica So is a 64 y.o. year old female patient with Hearing Loss     The patient presents to the office today for assessment of ears and hearing.  Patient reports that the ears feel full.  She indicates that it feels as if the ears have fluid present.  She does have concern for bilateral hearing loss.  All other ENT issues are negative at this time.      Review of Systems   All other systems reviewed and are negative.        Physical Exam:   General appearance: No acute distress. Normal facies. Symmetric facial movement. No gross lesions of the face are noted.  The external ear structures appear normal. The ear canals patent and the tympanic membranes are intact without evidence of air-fluid levels, retraction, or congenital defects.  Anterior rhinoscopy notes essentially a midline nasal septum. Examination is noted for normal healthy mucosal membranes without any evidence of lesions, polyps, or exudate. The tongue is normally mobile. There are no lesions on the gingiva, buccal, or oral mucosa. There are no oral cavity masses.  The neck is negative for mass lymphadenopathy. The trachea and parotid are clear. The thyroid bed is grossly unremarkable. The salivary gland structures are grossly unremarkable.    Audiogram demonstrates bilateral sensorineural hearing loss with mild to severe sloping loss with word discrimination of 96% right 92% left with eustachian tube dysfunction right.  Assessment/Plan   1.  Bilateral sensorineural hearing loss  2.  Eustachian tube dysfunction    Patient seen in the office today for assessment of ears.  Patient with bilateral sensorineural hearing loss which is symmetric.  Patient does have eustachian tube dysfunction right.  I recommend utilization of nasal steroids.  Will see the patient back as needed.    All questions were answered in this regard accordingly.

## 2024-02-23 NOTE — PROGRESS NOTES
Ms. So, age 64, was seen today for a hearing evaluation during her ENT visit with Dr. Ham.  She reported concern for hearing loss as well as feeling as though there were fluid in her ears.  She states that often she can pop her ears and finds she hears better.    Results:  Otoscopy revealed clear ear canals and tympanic membranes were visualized bilaterally.  Tympanometry revealed a normal, Type A tympanogram in her left ear, indicating normal ear canal volume, peak pressure and compliance and a Type C tympanogram in her right ear, indicating normal ear canal volume and compliance with significantly negative peak pressure.  Audiometric thresholds revealed a mild sloping to severe sensorineural hearing loss bilaterally.  Word recognition scores were excellent bilaterally.    Recommendations:  Follow-up with PCP, Dr. Patel, as medically directed.  Follow-up with ENT, Dr. Ham, as medically directed.  Consider binaural amplification.  Retest hearing annually to monitor.

## 2024-02-26 ENCOUNTER — OFFICE VISIT (OUTPATIENT)
Dept: PULMONOLOGY | Age: 64
End: 2024-02-26
Payer: MEDICARE

## 2024-02-26 VITALS
TEMPERATURE: 97.8 F | OXYGEN SATURATION: 95 % | BODY MASS INDEX: 33.9 KG/M2 | WEIGHT: 216 LBS | DIASTOLIC BLOOD PRESSURE: 68 MMHG | SYSTOLIC BLOOD PRESSURE: 118 MMHG | HEIGHT: 67 IN | HEART RATE: 75 BPM

## 2024-02-26 DIAGNOSIS — J98.4 RESTRICTIVE LUNG DISEASE: ICD-10-CM

## 2024-02-26 DIAGNOSIS — J84.9 ILD (INTERSTITIAL LUNG DISEASE) (HCC): Primary | ICD-10-CM

## 2024-02-26 DIAGNOSIS — E66.9 OBESITY, UNSPECIFIED CLASSIFICATION, UNSPECIFIED OBESITY TYPE, UNSPECIFIED WHETHER SERIOUS COMORBIDITY PRESENT: ICD-10-CM

## 2024-02-26 DIAGNOSIS — G47.33 OSA ON CPAP: ICD-10-CM

## 2024-02-26 DIAGNOSIS — R06.02 SHORTNESS OF BREATH: ICD-10-CM

## 2024-02-26 PROCEDURE — 3017F COLORECTAL CA SCREEN DOC REV: CPT | Performed by: INTERNAL MEDICINE

## 2024-02-26 PROCEDURE — 3074F SYST BP LT 130 MM HG: CPT | Performed by: INTERNAL MEDICINE

## 2024-02-26 PROCEDURE — G8484 FLU IMMUNIZE NO ADMIN: HCPCS | Performed by: INTERNAL MEDICINE

## 2024-02-26 PROCEDURE — G8427 DOCREV CUR MEDS BY ELIG CLIN: HCPCS | Performed by: INTERNAL MEDICINE

## 2024-02-26 PROCEDURE — G8399 PT W/DXA RESULTS DOCUMENT: HCPCS | Performed by: INTERNAL MEDICINE

## 2024-02-26 PROCEDURE — 3078F DIAST BP <80 MM HG: CPT | Performed by: INTERNAL MEDICINE

## 2024-02-26 PROCEDURE — 99213 OFFICE O/P EST LOW 20 MIN: CPT | Performed by: INTERNAL MEDICINE

## 2024-02-26 PROCEDURE — G8417 CALC BMI ABV UP PARAM F/U: HCPCS | Performed by: INTERNAL MEDICINE

## 2024-02-26 PROCEDURE — 1036F TOBACCO NON-USER: CPT | Performed by: INTERNAL MEDICINE

## 2024-02-26 PROCEDURE — 1123F ACP DISCUSS/DSCN MKR DOCD: CPT | Performed by: INTERNAL MEDICINE

## 2024-02-26 PROCEDURE — 1090F PRES/ABSN URINE INCON ASSESS: CPT | Performed by: INTERNAL MEDICINE

## 2024-02-26 NOTE — PROGRESS NOTES
PATIENT VISIT-PULMONARY/SLEEP    2/26/2024        HPI:     Michelle Le is a 64 y.o. female who was referred to pulmonary clinic for evaluation.     She was told in the past that she has COPD but has not been on inhalers.  She has been having shortness of breath mainly with activities but sometimes at rest.  Has been evaluated by cardiology and known to have coronary artery disease.  Has been given nebulizer treatment in the past which he used it years ago but not anymore.  She has some chest tightness but does not remember wheezing.  She has some dry cough intermittently.  Does not smoke but has secondhand smoking.  Snores and wakes up gasping for air.  She started sleepy during the day and she dozes off easily.           12/13/23:    Comes back for follow-up.  Underwent a pulm function test which I reviewed personally and interpreted the results independently.  There is evidence of restrictive defect.  There is significant response to bronchodilators in mid flows.  Total lung capacity is moderately reduced and diffusing capacity is severely reduced.  She had a sleep study that showed severe obstructive sleep apnea.  Subsequently underwent titration.  Final pressure on CPAP study was 13 cm H2O but still had some residual events.  She was started on a maintenance inhaler in last visit.  She felt better with it however she is not taking it daily.  She continues to be short of breath with activities.  She is in a wheelchair and cannot ambulate without a walker to assess 6-minute walk test.        2/26/24:     Comes back for follow-up.  Had a CT chest which I reviewed personally interpreted the results independently.  There is trace pleural effusion.  There is evidence of volume overload.  There is evidence of cardiomegaly.  There is some septal thickening.    She has been prescribed AutoPap with a minimum pressure of 10 and maximum pressure 20 cm H2O.  90th percentile pressure is 15 cm H2O.  Continues

## 2024-03-05 ENCOUNTER — APPOINTMENT (OUTPATIENT)
Dept: CT IMAGING | Age: 64
DRG: 312 | End: 2024-03-05
Payer: MEDICARE

## 2024-03-05 ENCOUNTER — HOSPITAL ENCOUNTER (INPATIENT)
Age: 64
LOS: 4 days | Discharge: NURSING, REHAB OR LONG-TERM CARE | DRG: 312 | End: 2024-03-09
Attending: INTERNAL MEDICINE | Admitting: STUDENT IN AN ORGANIZED HEALTH CARE EDUCATION/TRAINING PROGRAM
Payer: MEDICARE

## 2024-03-05 DIAGNOSIS — Z99.2 DIALYSIS PATIENT (HCC): ICD-10-CM

## 2024-03-05 DIAGNOSIS — R10.9 ABDOMINAL PAIN, UNSPECIFIED ABDOMINAL LOCATION: ICD-10-CM

## 2024-03-05 DIAGNOSIS — T07.XXXA MULTIPLE CONTUSIONS: ICD-10-CM

## 2024-03-05 DIAGNOSIS — I50.9 ACUTE DECOMPENSATED HEART FAILURE (HCC): Primary | ICD-10-CM

## 2024-03-05 DIAGNOSIS — R53.1 GENERAL WEAKNESS: ICD-10-CM

## 2024-03-05 DIAGNOSIS — I95.9 HYPOTENSION, UNSPECIFIED HYPOTENSION TYPE: ICD-10-CM

## 2024-03-05 DIAGNOSIS — R53.1 GENERALIZED WEAKNESS: ICD-10-CM

## 2024-03-05 DIAGNOSIS — R29.6 MULTIPLE FALLS: ICD-10-CM

## 2024-03-05 LAB
ALBUMIN SERPL-MCNC: 4 G/DL (ref 3.5–4.6)
ALP SERPL-CCNC: 89 U/L (ref 40–130)
ALT SERPL-CCNC: 13 U/L (ref 0–33)
ANION GAP SERPL CALCULATED.3IONS-SCNC: 10 MEQ/L (ref 9–15)
APTT PPP: 36.6 SEC (ref 24.4–36.8)
AST SERPL-CCNC: 17 U/L (ref 0–35)
BASOPHILS # BLD: 0 K/UL (ref 0–0.2)
BASOPHILS NFR BLD: 0.4 %
BILIRUB SERPL-MCNC: 1.1 MG/DL (ref 0.2–0.7)
BUN SERPL-MCNC: 10 MG/DL (ref 8–23)
CALCIUM SERPL-MCNC: 9.5 MG/DL (ref 8.5–9.9)
CHLORIDE SERPL-SCNC: 94 MEQ/L (ref 95–107)
CO2 SERPL-SCNC: 31 MEQ/L (ref 20–31)
CREAT SERPL-MCNC: 3.73 MG/DL (ref 0.5–0.9)
EOSINOPHIL # BLD: 0.5 K/UL (ref 0–0.7)
EOSINOPHIL NFR BLD: 4.7 %
ERYTHROCYTE [DISTWIDTH] IN BLOOD BY AUTOMATED COUNT: 15.9 % (ref 11.5–14.5)
ETHANOL PERCENT: NORMAL G/DL
ETHANOLAMINE SERPL-MCNC: <10 MG/DL (ref 0–0.08)
GLOBULIN SER CALC-MCNC: 3.3 G/DL (ref 2.3–3.5)
GLUCOSE SERPL-MCNC: 95 MG/DL (ref 70–99)
HCT VFR BLD AUTO: 35 % (ref 37–47)
HGB BLD-MCNC: 11.5 G/DL (ref 12–16)
INR PPP: 1.5
LYMPHOCYTES # BLD: 1.6 K/UL (ref 1–4.8)
LYMPHOCYTES NFR BLD: 16.7 %
MCH RBC QN AUTO: 33.4 PG (ref 27–31.3)
MCHC RBC AUTO-ENTMCNC: 32.9 % (ref 33–37)
MCV RBC AUTO: 101.7 FL (ref 79.4–94.8)
MONOCYTES # BLD: 0.5 K/UL (ref 0.2–0.8)
MONOCYTES NFR BLD: 5.3 %
NEUTROPHILS # BLD: 6.9 K/UL (ref 1.4–6.5)
NEUTS SEG NFR BLD: 72.6 %
PLATELET # BLD AUTO: 132 K/UL (ref 130–400)
POTASSIUM SERPL-SCNC: 3.5 MEQ/L (ref 3.4–4.9)
PROT SERPL-MCNC: 7.3 G/DL (ref 6.3–8)
PROTHROMBIN TIME: 18.5 SEC (ref 12.3–14.9)
RBC # BLD AUTO: 3.44 M/UL (ref 4.2–5.4)
SODIUM SERPL-SCNC: 135 MEQ/L (ref 135–144)
WBC # BLD AUTO: 9.5 K/UL (ref 4.8–10.8)

## 2024-03-05 PROCEDURE — 72125 CT NECK SPINE W/O DYE: CPT

## 2024-03-05 PROCEDURE — G0378 HOSPITAL OBSERVATION PER HR: HCPCS

## 2024-03-05 PROCEDURE — 6360000002 HC RX W HCPCS: Performed by: INTERNAL MEDICINE

## 2024-03-05 PROCEDURE — 70450 CT HEAD/BRAIN W/O DYE: CPT

## 2024-03-05 PROCEDURE — 71250 CT THORAX DX C-: CPT

## 2024-03-05 PROCEDURE — 72131 CT LUMBAR SPINE W/O DYE: CPT

## 2024-03-05 PROCEDURE — 99285 EMERGENCY DEPT VISIT HI MDM: CPT

## 2024-03-05 PROCEDURE — P9047 ALBUMIN (HUMAN), 25%, 50ML: HCPCS | Performed by: INTERNAL MEDICINE

## 2024-03-05 PROCEDURE — 85610 PROTHROMBIN TIME: CPT

## 2024-03-05 PROCEDURE — 85730 THROMBOPLASTIN TIME PARTIAL: CPT

## 2024-03-05 PROCEDURE — 96361 HYDRATE IV INFUSION ADD-ON: CPT

## 2024-03-05 PROCEDURE — 6370000000 HC RX 637 (ALT 250 FOR IP)

## 2024-03-05 PROCEDURE — 93005 ELECTROCARDIOGRAM TRACING: CPT | Performed by: PHYSICIAN ASSISTANT

## 2024-03-05 PROCEDURE — 72128 CT CHEST SPINE W/O DYE: CPT

## 2024-03-05 PROCEDURE — 6830039001 HC L3 TRAUMA PRIORITY

## 2024-03-05 PROCEDURE — 2580000003 HC RX 258

## 2024-03-05 PROCEDURE — 36415 COLL VENOUS BLD VENIPUNCTURE: CPT

## 2024-03-05 PROCEDURE — 70486 CT MAXILLOFACIAL W/O DYE: CPT

## 2024-03-05 PROCEDURE — 82077 ASSAY SPEC XCP UR&BREATH IA: CPT

## 2024-03-05 PROCEDURE — 96360 HYDRATION IV INFUSION INIT: CPT

## 2024-03-05 PROCEDURE — 74176 CT ABD & PELVIS W/O CONTRAST: CPT

## 2024-03-05 PROCEDURE — 2580000003 HC RX 258: Performed by: INTERNAL MEDICINE

## 2024-03-05 PROCEDURE — 80053 COMPREHEN METABOLIC PANEL: CPT

## 2024-03-05 PROCEDURE — 85025 COMPLETE CBC W/AUTO DIFF WBC: CPT

## 2024-03-05 PROCEDURE — 2580000003 HC RX 258: Performed by: PHYSICIAN ASSISTANT

## 2024-03-05 RX ORDER — SODIUM CHLORIDE 0.9 % (FLUSH) 0.9 %
5-40 SYRINGE (ML) INJECTION EVERY 12 HOURS SCHEDULED
Status: CANCELLED | OUTPATIENT
Start: 2024-03-05

## 2024-03-05 RX ORDER — ACETAMINOPHEN 325 MG/1
650 TABLET ORAL EVERY 6 HOURS PRN
Status: CANCELLED | OUTPATIENT
Start: 2024-03-05

## 2024-03-05 RX ORDER — SODIUM CHLORIDE 0.9 % (FLUSH) 0.9 %
5-40 SYRINGE (ML) INJECTION EVERY 12 HOURS SCHEDULED
Status: DISCONTINUED | OUTPATIENT
Start: 2024-03-05 | End: 2024-03-09 | Stop reason: HOSPADM

## 2024-03-05 RX ORDER — POLYETHYLENE GLYCOL 3350 17 G/17G
17 POWDER, FOR SOLUTION ORAL DAILY PRN
Status: CANCELLED | OUTPATIENT
Start: 2024-03-05

## 2024-03-05 RX ORDER — 0.9 % SODIUM CHLORIDE 0.9 %
500 INTRAVENOUS SOLUTION INTRAVENOUS ONCE
Status: COMPLETED | OUTPATIENT
Start: 2024-03-05 | End: 2024-03-05

## 2024-03-05 RX ORDER — SODIUM CHLORIDE 0.9 % (FLUSH) 0.9 %
5-40 SYRINGE (ML) INJECTION PRN
Status: CANCELLED | OUTPATIENT
Start: 2024-03-05

## 2024-03-05 RX ORDER — ACETAMINOPHEN 650 MG/1
650 SUPPOSITORY RECTAL EVERY 6 HOURS PRN
Status: CANCELLED | OUTPATIENT
Start: 2024-03-05

## 2024-03-05 RX ORDER — POLYETHYLENE GLYCOL 3350 17 G/17G
17 POWDER, FOR SOLUTION ORAL DAILY PRN
Status: DISCONTINUED | OUTPATIENT
Start: 2024-03-05 | End: 2024-03-09 | Stop reason: HOSPADM

## 2024-03-05 RX ORDER — ONDANSETRON 4 MG/1
4 TABLET, ORALLY DISINTEGRATING ORAL EVERY 8 HOURS PRN
Status: CANCELLED | OUTPATIENT
Start: 2024-03-05

## 2024-03-05 RX ORDER — ENOXAPARIN SODIUM 100 MG/ML
30 INJECTION SUBCUTANEOUS DAILY
Status: CANCELLED | OUTPATIENT
Start: 2024-03-05

## 2024-03-05 RX ORDER — ENOXAPARIN SODIUM 100 MG/ML
30 INJECTION SUBCUTANEOUS DAILY
Status: DISCONTINUED | OUTPATIENT
Start: 2024-03-06 | End: 2024-03-06

## 2024-03-05 RX ORDER — MIDODRINE HYDROCHLORIDE 10 MG/1
10 TABLET ORAL
Status: DISCONTINUED | OUTPATIENT
Start: 2024-03-06 | End: 2024-03-07

## 2024-03-05 RX ORDER — ACETAMINOPHEN 325 MG/1
650 TABLET ORAL EVERY 6 HOURS PRN
Status: DISCONTINUED | OUTPATIENT
Start: 2024-03-05 | End: 2024-03-09 | Stop reason: HOSPADM

## 2024-03-05 RX ORDER — ALBUMIN (HUMAN) 12.5 G/50ML
25 SOLUTION INTRAVENOUS ONCE
Status: COMPLETED | OUTPATIENT
Start: 2024-03-05 | End: 2024-03-06

## 2024-03-05 RX ORDER — ONDANSETRON 2 MG/ML
4 INJECTION INTRAMUSCULAR; INTRAVENOUS EVERY 6 HOURS PRN
Status: DISCONTINUED | OUTPATIENT
Start: 2024-03-05 | End: 2024-03-09 | Stop reason: HOSPADM

## 2024-03-05 RX ORDER — SODIUM CHLORIDE 0.9 % (FLUSH) 0.9 %
5-40 SYRINGE (ML) INJECTION PRN
Status: DISCONTINUED | OUTPATIENT
Start: 2024-03-05 | End: 2024-03-09 | Stop reason: HOSPADM

## 2024-03-05 RX ORDER — SODIUM CHLORIDE 9 MG/ML
INJECTION, SOLUTION INTRAVENOUS PRN
Status: DISCONTINUED | OUTPATIENT
Start: 2024-03-05 | End: 2024-03-09 | Stop reason: HOSPADM

## 2024-03-05 RX ORDER — ONDANSETRON 2 MG/ML
4 INJECTION INTRAMUSCULAR; INTRAVENOUS EVERY 6 HOURS PRN
Status: CANCELLED | OUTPATIENT
Start: 2024-03-05

## 2024-03-05 RX ORDER — 0.9 % SODIUM CHLORIDE 0.9 %
500 INTRAVENOUS SOLUTION INTRAVENOUS ONCE
Status: COMPLETED | OUTPATIENT
Start: 2024-03-05 | End: 2024-03-06

## 2024-03-05 RX ORDER — SODIUM CHLORIDE 9 MG/ML
INJECTION, SOLUTION INTRAVENOUS PRN
Status: CANCELLED | OUTPATIENT
Start: 2024-03-05

## 2024-03-05 RX ORDER — SENNOSIDES A AND B 8.6 MG/1
1 TABLET, FILM COATED ORAL DAILY PRN
Status: DISCONTINUED | OUTPATIENT
Start: 2024-03-05 | End: 2024-03-09 | Stop reason: HOSPADM

## 2024-03-05 RX ADMIN — SODIUM CHLORIDE 500 ML: 9 INJECTION, SOLUTION INTRAVENOUS at 16:14

## 2024-03-05 RX ADMIN — ALBUMIN (HUMAN) 25 G: 0.25 INJECTION, SOLUTION INTRAVENOUS at 23:55

## 2024-03-05 RX ADMIN — ACETAMINOPHEN 325MG 650 MG: 325 TABLET ORAL at 22:18

## 2024-03-05 RX ADMIN — SODIUM CHLORIDE 500 ML: 9 INJECTION, SOLUTION INTRAVENOUS at 22:13

## 2024-03-05 RX ADMIN — Medication 10 ML: at 21:42

## 2024-03-05 ASSESSMENT — ENCOUNTER SYMPTOMS
CHOKING: 0
ROS SKIN COMMENTS: MULTIPLE BRUISES
ABDOMINAL PAIN: 0
ABDOMINAL DISTENTION: 0
NAUSEA: 0
SORE THROAT: 0
VOMITING: 0
SHORTNESS OF BREATH: 0
COLOR CHANGE: 1
EYE DISCHARGE: 0
WHEEZING: 0
DIARRHEA: 0
APNEA: 0
VOICE CHANGE: 0
RHINORRHEA: 0
BACK PAIN: 1

## 2024-03-05 ASSESSMENT — PAIN DESCRIPTION - ONSET: ONSET: ON-GOING

## 2024-03-05 ASSESSMENT — PAIN DESCRIPTION - LOCATION
LOCATION: CHEST
LOCATION: BUTTOCKS;BACK

## 2024-03-05 ASSESSMENT — PAIN SCALES - GENERAL
PAINLEVEL_OUTOF10: 7
PAINLEVEL_OUTOF10: 0

## 2024-03-05 ASSESSMENT — PAIN DESCRIPTION - PAIN TYPE: TYPE: ACUTE PAIN

## 2024-03-05 ASSESSMENT — PAIN DESCRIPTION - FREQUENCY: FREQUENCY: CONTINUOUS

## 2024-03-05 ASSESSMENT — PAIN - FUNCTIONAL ASSESSMENT: PAIN_FUNCTIONAL_ASSESSMENT: 0-10

## 2024-03-05 NOTE — H&P
per sliding scale: If 151-200=2u; 201-250-4u; 251-300=6u; 301-350=8u; 351-400=10u. Call MD/NP if >400.    ProviderFelicita MD   insulin lispro (HUMALOG) 100 UNIT/ML injection vial Inject 0-4 Units into the skin nightly Indications: Type 2 Diabetes Inject as per sliding scale: If 201-250=1u; 251-300=2u; 301-350=3u; 351-400=4u.  Call MD/NP if >400.    Provider, Historical, MD   b complex-WELLIGNTON-folic acid (NEPHROCAPS) 1 MG capsule Take 1 capsule by mouth daily Indications: Dialysis Dependent Chronic Kidney Failure    ProviderFelicita MD   pantoprazole (PROTONIX) 20 MG tablet Take 1 tablet by mouth daily Indications: Gastroesophageal Reflux Disease    ProviderFelicita MD   nitroGLYCERIN (NITROSTAT) 0.4 MG SL tablet up to max of 3 total doses. If no relief after 1 dose, call 911. 11/15/22   Morteza Avitia MD   hydrOXYzine HCl (ATARAX) 25 MG tablet Take 1 tablet by mouth 2 times daily 10/19/22   Provider, MD Felicita   SURE COMFORT PEN NEEDLES 30G X 8 MM MISC USE AS DIRECTED FIVE TIMES A DAILY 10/18/22   Andrei Nino MD   LANTUS SOLOSTAR 100 UNIT/ML injection pen INJECT 55 UNITS SUBCUTANEOUSLY AT BEDTIME  Patient taking differently: Inject 55 Units into the skin nightly Indications: Type 2 Diabetes INJECT 55 UNITS SUBCUTANEOUSLY AT BEDTIME 10/3/22   Andrei Nino MD   ARIPiprazole (ABILIFY) 5 MG tablet TAKE 1 TABLET BY MOUTH ONCE DAILY 8/17/22 8/23/23  Yasmine Rodriguez MD   sertraline (ZOLOFT) 50 MG tablet Take 1 tablet by mouth nightly  Patient taking differently: Take 1 tablet by mouth nightly Indications: Depression 7/16/22   Jennifer Joyce DO   atorvastatin (LIPITOR) 40 MG tablet Take 1 tablet by mouth nightly  Patient taking differently: Take 1 tablet by mouth nightly Indications: High Amount of Fats in the Blood 7/16/22   Jennifer Joyce DO   Handicap Placard MISC by Does not apply route Expiration in 5 years. 3/16/22   Deann Castillo MD   blood glucose test strips (ONETOUCH VERIO)  moderate ascites which has increased.  2. No bowel obstruction or free air.  3. Cholelithiasis.    CT Face: No acute facial bone trauma.      ASSESSMENT:  Michelle Le is a 64 y.o. female with a significant PMHx including CHG, NSTEMI, CAD, asthama, [place], CKDS4 on dialysis, sepsis, hepatitis, schizophrenia, and uncontrolled DM. She presented to [facility] ED on 3/5/2024 s/p multiple falls from standing. Over the past 4 days, patient endorses multiple falls from standing with +head strike no LOC and +Eliquis. She states falls are from her \"feeling weak\" and dizzy from when she \"bends down.\"    Trauma workup pending      PLAN:  Trauma workup pending.   Patient signed out to trauma attending, Dr. Melvin Alatorre, JULY  Trauma/Critical Care/General Surgery  [See treatment team sticky note for contact information]     This patient's plan of care was discussed and made in collaboration with Trauma Attending physician, Sadi Charles MD.          Teaching Physician Note:  I saw and evaluated the patient.  I personally obtained the key and critical portions of the history and physical exam.  I reviewed the EFFIE's documentation and discussed the patient with the EFFIE.  I agree with the EFFIE's medical decision making as documented in the EFFIE note.      Imaging reviewed, no acute trauma related findings, further care per ED/Medicine.       Sadi Charles MD

## 2024-03-05 NOTE — H&P
DEPARTMENT OF HOSPITAL MEDICINE    HISTORY AND PHYSICAL EXAM    PATIENT NAME:  Michelle Le    MRN:  [id number]  SERVICE DATE:  3/5/2024   SERVICE TIME:  6:51 PM    Primary Care Physician: Adilene Terry MD     SUBJECTIVE  CHIEF COMPLAINT:  No chief complaint on file.       HPI      Michelle Le is a 64 y.o., White (non-) female, with past medical history of CHF, NSTEMI, CAD s/p percutaneous coronary angioplasty, CKD stage IV on dialysis T/Th/Sat, hepatitis, schizophrenia, DM2 with neuropathy and proteinuria, who presents to the emergency department with complaints of having 3 falls from standing with head strike within the last 4 days. On arrival, patient was hypotensive with BP 90/44.  After administering 500 cc of IV bolus, her BP came up to 105/54.  CT of head/spine/facial bones are negative for acute fractures.  CT of abdomen and pelvis revealed small to moderate ascites which has increased and cholelithiasis.  Patient denies loss of consciousness, headache, nausea, vomiting, but reports feeling weak and dizzy. She also denies fever, chills, abdominal pain and change in appetite. Patient reports having intermittent chest pain \"which comes and goes.\"  Her EKG revealed atrial flutter with 4:1 AV conduction and bifascicular block. Patient does not remember what medications she takes at home.  She states her daughter, who she lives with, manages all her medications.  Walks with roller.  Denies any pain at this time.    Patient will be admitted to the medical floor with telemetry for further evaluation and management.      The primary encounter diagnosis was General weakness. Diagnoses of Hypotension, unspecified hypotension type, Multiple falls, Multiple contusions, and Abdominal pain, unspecified abdominal location were also pertinent to this visit.      PAST MEDICAL HISTORY:    Past Medical History:   Diagnosis Date    Angina at rest 5/24/2020    Anxiety     CAD S/P percutaneous coronary      CT CSpine W/O Contrast    Result Date: 3/5/2024  1. There is no acute compression fracture or subluxation of the cervical spine. 2. Advanced multilevel degenerative disc and degenerative joint disease.     CT Head W/O Contrast    Result Date: 3/5/2024  1.  There is no acute intracranial abnormality.  Specifically, there is no intracranial hemorrhage. 2. Atrophy and periventricular leukomalacia, .      CT Head W/O Contrast   Final Result   1.  There is no acute intracranial abnormality.  Specifically, there is no   intracranial hemorrhage.   2. Atrophy and periventricular leukomalacia,   .         CT CSpine W/O Contrast   Final Result   1. There is no acute compression fracture or subluxation of the cervical   spine.   2. Advanced multilevel degenerative disc and degenerative joint disease.         CT FACIAL BONES WO CONTRAST   Final Result   No acute facial bone trauma.         CT CHEST WO CONTRAST   Final Result   1. Triangular density measuring 2.6 x 2.8 cm between the sternum and SVC to   right atrium with multiple bubbles of gas. This could represent recent   intervention, developing abscess with gas-forming organism, or potentially   iatrogenic intravenous administration of gas.  Consider for further   evaluation with a contrast-enhanced CT.   2. Few bubbles of gas seen in a vein anterior and deep to the right clavicle   supports venous gas.   3. Stable small left pleural effusion with overlying atelectasis.   4. Stable mild subpleural interstitial prominence the periphery of the mid   lower lung zones.   5. Increased volume ascites seen in the upper abdomen.   6. No CT evidence for acute fracture involving the bones of the thorax.   However there is old fractures right lateral 4th through 7th ribs and stable   compression fracture T11.         CT ABDOMEN PELVIS WO CONTRAST Additional Contrast? None   Final Result   1. Small to moderate ascites which has increased.   2. No bowel obstruction or free air.

## 2024-03-05 NOTE — ED PROVIDER NOTES
[facility] ED  EMERGENCY DEPARTMENT ENCOUNTER      Pt Name: Michelle Le  MRN: [id number]  Birthdate 1960  Date of evaluation: 3/5/2024  Provider: Veto Gray PA-C  4:03 PM EST    CHIEF COMPLAINT     No chief complaint on file.        HISTORY OF PRESENT ILLNESS   (Location/Symptom, Timing/Onset, Context/Setting, Quality, Duration, Modifying Factors, Severity)  Note limiting factors.   Michelle Le is a 64 y.o. female who presents to the emergency department patient presents with 4 falls in 3 days most recently last night complains of hitting her head she has shoulder pain hip pain she has bruising evident just came from dialysis 104 systolic blood pressure noted per EMS at dialysis they note they had lower readings and route patient currently 90/44 mmHg on arrival trauma service at bedside patient denies loss of consciousness states she did not come in last night was convinced to go in by Dr. DANUTA Mayo at dialysis.  Symptoms moderate severity worse with touch or motion nothing improves her symptoms.  Patient medication list she is on apixaban.    HPI    Nursing Notes were reviewed.    REVIEW OF SYSTEMS    (2-9 systems for level 4, 10 or more for level 5)     Review of Systems   Constitutional:  Negative for activity change, appetite change and unexpected weight change.   HENT:  Negative for ear discharge, nosebleeds and voice change.    Eyes:  Negative for discharge.   Respiratory:  Negative for apnea.    Cardiovascular:  Negative for chest pain.   Gastrointestinal:  Negative for abdominal distention, abdominal pain, nausea and vomiting.   Musculoskeletal:  Positive for arthralgias and back pain.   Skin:  Positive for color change. Negative for pallor.   Neurological:  Positive for weakness and headaches. Negative for seizures and facial asymmetry.   Hematological:  Bruises/bleeds easily.   Psychiatric/Behavioral:  Negative for behavioral problems, self-injury and sleep disturbance.   Worked at [employer], as a nurse's aide Disabled due to mental illness and back pain    Lived at [facility], was discharged, returned to independent living in 2017 in the daughter's house and has adjusted well    One son and one daughter, live in the same house with patient, Michelle pays the rent    Hobbies reading ([employer])             10/11/2021 SDOH updates; patient lives with her daughter son-in-law and 2 grandchildren and patient's handicapped son.  Per daughter, her brother is blind, MRDD, CP multiple health issues.  Daughter's  is patient's legal guardian.    Patient has hemodialysis Tuesday Thursday and Saturday.  Patient's bedroom is on main floor with a half bath.  Daughter walks patient upstairs once weekly for full bath.  Patient is using her walker in the home.  Patient has a hospital bed in the home.     Social Determinants of Health     Financial Resource Strain: Low Risk  (7/29/2022)    Overall Financial Resource Strain (CARDIA)     Difficulty of Paying Living Expenses: Not hard at all   Food Insecurity: No Food Insecurity (12/26/2023)    Hunger Vital Sign     Worried About Running Out of Food in the Last Year: Never true     Ran Out of Food in the Last Year: Never true   Transportation Needs: No Transportation Needs (12/26/2023)    PRAPARE - Transportation     Lack of Transportation (Medical): No     Lack of Transportation (Non-Medical): No   Physical Activity: Sufficiently Active (5/13/2022)    Exercise Vital Sign     Days of Exercise per Week: 3 days     Minutes of Exercise per Session: 60 min   Housing Stability: Low Risk  (12/26/2023)    Housing Stability Vital Sign     Unable to Pay for Housing in the Last Year: No     Number of Places Lived in the Last Year: 1     Unstable Housing in the Last Year: No       SCREENINGS         [place] Coma Scale  Eye Opening: Spontaneous  Best Verbal Response: Oriented  Best Motor Response: Obeys commands  Michele Coma Scale Score: 15

## 2024-03-05 NOTE — ED NOTES
Per provider, attempt to ambulate the patient. Pt required max assist from supine to sit, unable to transfer self. Assisted to stand with +2 assist, pt took 2 very unsteady small steps, and assisted back to bad.

## 2024-03-05 NOTE — ED TRIAGE NOTES
Pt to ed from dialysis via ems with reports of 4 falls in the past 3 days.  Per ems, pt finished treatment, hypotensive PTA, 90/40s.   PT Aox3, denies any specific new injury.   Pt reports that she lives at home, states that her falls occurred when she was attempting to ambulate with her walker without assistance.  Pt has bruising noted to all extremities, face and back. Pt denies any loc.

## 2024-03-06 ENCOUNTER — APPOINTMENT (OUTPATIENT)
Age: 64
DRG: 312 | End: 2024-03-06
Attending: INTERNAL MEDICINE
Payer: MEDICARE

## 2024-03-06 PROBLEM — R57.9 SHOCK (HCC): Status: ACTIVE | Noted: 2024-03-06

## 2024-03-06 LAB
ANION GAP SERPL CALCULATED.3IONS-SCNC: 14 MEQ/L (ref 9–15)
BASOPHILS # BLD: 0 K/UL (ref 0–0.2)
BASOPHILS NFR BLD: 0.6 %
BUN SERPL-MCNC: 14 MG/DL (ref 8–23)
CALCIUM SERPL-MCNC: 9.2 MG/DL (ref 8.5–9.9)
CHLORIDE SERPL-SCNC: 95 MEQ/L (ref 95–107)
CO2 SERPL-SCNC: 31 MEQ/L (ref 20–31)
CREAT SERPL-MCNC: 4.86 MG/DL (ref 0.5–0.9)
ECHO AV AREA PEAK VELOCITY: 2.6 CM2
ECHO AV AREA VTI: 2.8 CM2
ECHO AV AREA/BSA PEAK VELOCITY: 1.2 CM2/M2
ECHO AV AREA/BSA VTI: 1.3 CM2/M2
ECHO AV CUSP MM: 1.5 CM
ECHO AV MEAN GRADIENT: 5 MMHG
ECHO AV MEAN VELOCITY: 1 M/S
ECHO AV PEAK GRADIENT: 9 MMHG
ECHO AV PEAK VELOCITY: 1.7 M/S
ECHO AV VELOCITY RATIO: 0.82
ECHO AV VTI: 29.4 CM
ECHO BSA: 2.19 M2
ECHO LA DIAMETER INDEX: 2.5 CM/M2
ECHO LA DIAMETER: 5.3 CM
ECHO LA VOL A-L A2C: 81 ML (ref 22–52)
ECHO LA VOL A-L A4C: 68 ML (ref 22–52)
ECHO LA VOL MOD A2C: 78 ML (ref 22–52)
ECHO LA VOL MOD A4C: 66 ML (ref 22–52)
ECHO LA VOLUME AREA LENGTH: 77 ML
ECHO LA VOLUME INDEX A-L A2C: 38 ML/M2 (ref 16–34)
ECHO LA VOLUME INDEX A-L A4C: 32 ML/M2 (ref 16–34)
ECHO LA VOLUME INDEX AREA LENGTH: 36 ML/M2 (ref 16–34)
ECHO LA VOLUME INDEX MOD A2C: 37 ML/M2 (ref 16–34)
ECHO LA VOLUME INDEX MOD A4C: 31 ML/M2 (ref 16–34)
ECHO LV E' LATERAL VELOCITY: 7 CM/S
ECHO LV E' SEPTAL VELOCITY: 6 CM/S
ECHO LV EDV A2C: 42 ML
ECHO LV EDV A4C: 53 ML
ECHO LV EDV BP: 48 ML (ref 56–104)
ECHO LV EDV INDEX A4C: 25 ML/M2
ECHO LV EDV INDEX BP: 23 ML/M2
ECHO LV EDV NDEX A2C: 20 ML/M2
ECHO LV EJECTION FRACTION A2C: 58 %
ECHO LV EJECTION FRACTION A4C: 61 %
ECHO LV EJECTION FRACTION BIPLANE: 60 % (ref 55–100)
ECHO LV ESV A2C: 18 ML
ECHO LV ESV A4C: 21 ML
ECHO LV ESV BP: 19 ML (ref 19–49)
ECHO LV ESV INDEX A2C: 8 ML/M2
ECHO LV ESV INDEX A4C: 10 ML/M2
ECHO LV ESV INDEX BP: 9 ML/M2
ECHO LV FRACTIONAL SHORTENING: 28 % (ref 28–44)
ECHO LV INTERNAL DIMENSION DIASTOLE INDEX: 1.84 CM/M2
ECHO LV INTERNAL DIMENSION DIASTOLIC: 3.9 CM (ref 3.9–5.3)
ECHO LV INTERNAL DIMENSION SYSTOLIC INDEX: 1.32 CM/M2
ECHO LV INTERNAL DIMENSION SYSTOLIC: 2.8 CM
ECHO LV IVSD: 1.7 CM (ref 0.6–0.9)
ECHO LV IVSS: 1.8 CM
ECHO LV MASS 2D: 201.5 G (ref 67–162)
ECHO LV MASS INDEX 2D: 95 G/M2 (ref 43–95)
ECHO LV POSTERIOR WALL DIASTOLIC: 1.1 CM (ref 0.6–0.9)
ECHO LV POSTERIOR WALL SYSTOLIC: 1.7 CM
ECHO LV RELATIVE WALL THICKNESS RATIO: 0.56
ECHO LVOT AREA: 3.1 CM2
ECHO LVOT AV VTI INDEX: 0.93
ECHO LVOT DIAM: 2 CM
ECHO LVOT MEAN GRADIENT: 4 MMHG
ECHO LVOT PEAK GRADIENT: 7 MMHG
ECHO LVOT PEAK VELOCITY: 1.4 M/S
ECHO LVOT STROKE VOLUME INDEX: 40.3 ML/M2
ECHO LVOT SV: 85.4 ML
ECHO LVOT VTI: 27.2 CM
ECHO MV A VELOCITY: 0.88 M/S
ECHO MV AREA PHT: 3.9 CM2
ECHO MV AREA VTI: 3.1 CM2
ECHO MV E DECELERATION TIME (DT): 190.4 MS
ECHO MV E VELOCITY: 1.74 M/S
ECHO MV E/A RATIO: 1.98
ECHO MV E/E' LATERAL: 24.86
ECHO MV E/E' RATIO (AVERAGED): 26.93
ECHO MV LVOT VTI INDEX: 1.02
ECHO MV MAX VELOCITY: 1.6 M/S
ECHO MV MEAN GRADIENT: 4 MMHG
ECHO MV MEAN VELOCITY: 0.8 M/S
ECHO MV PEAK GRADIENT: 12 MMHG
ECHO MV PRESSURE HALF TIME (PHT): 56.4 MS
ECHO MV VTI: 27.8 CM
ECHO PV MAX VELOCITY: 0.8 M/S
ECHO PV PEAK GRADIENT: 3 MMHG
ECHO RV INTERNAL DIMENSION: 3.1 CM
ECHO RV TAPSE: 1.5 CM (ref 1.7–?)
ECHO TV REGURGITANT MAX VELOCITY: 4.06 M/S
ECHO TV REGURGITANT PEAK GRADIENT: 66 MMHG
EKG ATRIAL RATE: 260 BPM
EKG P AXIS: 112 DEGREES
EKG Q-T INTERVAL: 478 MS
EKG QRS DURATION: 132 MS
EKG QTC CALCULATION (BAZETT): 497 MS
EKG R AXIS: -62 DEGREES
EKG T AXIS: 114 DEGREES
EKG VENTRICULAR RATE: 65 BPM
EOSINOPHIL # BLD: 0.3 K/UL (ref 0–0.7)
EOSINOPHIL NFR BLD: 4.9 %
ERYTHROCYTE [DISTWIDTH] IN BLOOD BY AUTOMATED COUNT: 15.9 % (ref 11.5–14.5)
GLUCOSE BLD-MCNC: 192 MG/DL (ref 70–99)
GLUCOSE BLD-MCNC: 209 MG/DL (ref 70–99)
GLUCOSE BLD-MCNC: 264 MG/DL (ref 70–99)
GLUCOSE SERPL-MCNC: 77 MG/DL (ref 70–99)
HCT VFR BLD AUTO: 30.4 % (ref 37–47)
HGB BLD-MCNC: 10.1 G/DL (ref 12–16)
LYMPHOCYTES # BLD: 1.4 K/UL (ref 1–4.8)
LYMPHOCYTES NFR BLD: 21 %
MCH RBC QN AUTO: 33.6 PG (ref 27–31.3)
MCHC RBC AUTO-ENTMCNC: 33.2 % (ref 33–37)
MCV RBC AUTO: 101 FL (ref 79.4–94.8)
MONOCYTES # BLD: 0.6 K/UL (ref 0.2–0.8)
MONOCYTES NFR BLD: 8.5 %
NEUTROPHILS # BLD: 4.3 K/UL (ref 1.4–6.5)
NEUTS SEG NFR BLD: 64.7 %
PERFORMED ON: ABNORMAL
PLATELET # BLD AUTO: 106 K/UL (ref 130–400)
POTASSIUM SERPL-SCNC: 3.7 MEQ/L (ref 3.4–4.9)
PROCALCITONIN SERPL IA-MCNC: 0.26 NG/ML (ref 0–0.15)
RBC # BLD AUTO: 3.01 M/UL (ref 4.2–5.4)
SODIUM SERPL-SCNC: 140 MEQ/L (ref 135–144)
WBC # BLD AUTO: 6.7 K/UL (ref 4.8–10.8)

## 2024-03-06 PROCEDURE — 2580000003 HC RX 258: Performed by: INTERNAL MEDICINE

## 2024-03-06 PROCEDURE — 97166 OT EVAL MOD COMPLEX 45 MIN: CPT

## 2024-03-06 PROCEDURE — 2000000000 HC ICU R&B

## 2024-03-06 PROCEDURE — 85025 COMPLETE CBC W/AUTO DIFF WBC: CPT

## 2024-03-06 PROCEDURE — 2580000003 HC RX 258

## 2024-03-06 PROCEDURE — 6360000002 HC RX W HCPCS: Performed by: INTERNAL MEDICINE

## 2024-03-06 PROCEDURE — 5A1D70Z PERFORMANCE OF URINARY FILTRATION, INTERMITTENT, LESS THAN 6 HOURS PER DAY: ICD-10-PCS | Performed by: STUDENT IN AN ORGANIZED HEALTH CARE EDUCATION/TRAINING PROGRAM

## 2024-03-06 PROCEDURE — 97162 PT EVAL MOD COMPLEX 30 MIN: CPT

## 2024-03-06 PROCEDURE — 96361 HYDRATE IV INFUSION ADD-ON: CPT

## 2024-03-06 PROCEDURE — 2500000003 HC RX 250 WO HCPCS: Performed by: INTERNAL MEDICINE

## 2024-03-06 PROCEDURE — 84145 PROCALCITONIN (PCT): CPT

## 2024-03-06 PROCEDURE — 6360000002 HC RX W HCPCS

## 2024-03-06 PROCEDURE — 93306 TTE W/DOPPLER COMPLETE: CPT | Performed by: INTERNAL MEDICINE

## 2024-03-06 PROCEDURE — 96366 THER/PROPH/DIAG IV INF ADDON: CPT

## 2024-03-06 PROCEDURE — 99291 CRITICAL CARE FIRST HOUR: CPT | Performed by: INTERNAL MEDICINE

## 2024-03-06 PROCEDURE — 93010 ELECTROCARDIOGRAM REPORT: CPT | Performed by: INTERNAL MEDICINE

## 2024-03-06 PROCEDURE — 36415 COLL VENOUS BLD VENIPUNCTURE: CPT

## 2024-03-06 PROCEDURE — 80048 BASIC METABOLIC PNL TOTAL CA: CPT

## 2024-03-06 PROCEDURE — 96365 THER/PROPH/DIAG IV INF INIT: CPT

## 2024-03-06 PROCEDURE — 3E033XZ INTRODUCTION OF VASOPRESSOR INTO PERIPHERAL VEIN, PERCUTANEOUS APPROACH: ICD-10-PCS | Performed by: STUDENT IN AN ORGANIZED HEALTH CARE EDUCATION/TRAINING PROGRAM

## 2024-03-06 PROCEDURE — 6370000000 HC RX 637 (ALT 250 FOR IP): Performed by: NURSE PRACTITIONER

## 2024-03-06 PROCEDURE — 6370000000 HC RX 637 (ALT 250 FOR IP)

## 2024-03-06 PROCEDURE — P9047 ALBUMIN (HUMAN), 25%, 50ML: HCPCS | Performed by: INTERNAL MEDICINE

## 2024-03-06 PROCEDURE — C8929 TTE W OR WO FOL WCON,DOPPLER: HCPCS

## 2024-03-06 RX ORDER — GLUCAGON 1 MG/ML
1 KIT INJECTION PRN
Status: DISCONTINUED | OUTPATIENT
Start: 2024-03-06 | End: 2024-03-09 | Stop reason: HOSPADM

## 2024-03-06 RX ORDER — METOPROLOL SUCCINATE 25 MG/1
25 TABLET, EXTENDED RELEASE ORAL DAILY
Status: DISCONTINUED | OUTPATIENT
Start: 2024-03-06 | End: 2024-03-09 | Stop reason: HOSPADM

## 2024-03-06 RX ORDER — INSULIN LISPRO 100 [IU]/ML
0-4 INJECTION, SOLUTION INTRAVENOUS; SUBCUTANEOUS NIGHTLY
Status: DISCONTINUED | OUTPATIENT
Start: 2024-03-06 | End: 2024-03-06

## 2024-03-06 RX ORDER — DEXTROSE MONOHYDRATE 100 MG/ML
INJECTION, SOLUTION INTRAVENOUS CONTINUOUS PRN
Status: DISCONTINUED | OUTPATIENT
Start: 2024-03-06 | End: 2024-03-09 | Stop reason: HOSPADM

## 2024-03-06 RX ORDER — SERTRALINE HYDROCHLORIDE 25 MG/1
50 TABLET, FILM COATED ORAL NIGHTLY
Status: DISCONTINUED | OUTPATIENT
Start: 2024-03-06 | End: 2024-03-09 | Stop reason: HOSPADM

## 2024-03-06 RX ORDER — ARIPIPRAZOLE 5 MG/1
5 TABLET ORAL DAILY
Status: DISCONTINUED | OUTPATIENT
Start: 2024-03-06 | End: 2024-03-09 | Stop reason: HOSPADM

## 2024-03-06 RX ORDER — INSULIN LISPRO 100 [IU]/ML
0-4 INJECTION, SOLUTION INTRAVENOUS; SUBCUTANEOUS
Status: DISCONTINUED | OUTPATIENT
Start: 2024-03-06 | End: 2024-03-06

## 2024-03-06 RX ORDER — INSULIN LISPRO 100 [IU]/ML
0-16 INJECTION, SOLUTION INTRAVENOUS; SUBCUTANEOUS
Status: DISCONTINUED | OUTPATIENT
Start: 2024-03-06 | End: 2024-03-09 | Stop reason: HOSPADM

## 2024-03-06 RX ORDER — MIDODRINE HYDROCHLORIDE 10 MG/1
10 TABLET ORAL 2 TIMES DAILY
Status: DISCONTINUED | OUTPATIENT
Start: 2024-03-06 | End: 2024-03-06 | Stop reason: SDUPTHER

## 2024-03-06 RX ORDER — ALBUMIN (HUMAN) 12.5 G/50ML
25 SOLUTION INTRAVENOUS ONCE
Status: COMPLETED | OUTPATIENT
Start: 2024-03-06 | End: 2024-03-06

## 2024-03-06 RX ORDER — HYDROXYZINE HYDROCHLORIDE 25 MG/1
25 TABLET, FILM COATED ORAL 2 TIMES DAILY
Status: DISCONTINUED | OUTPATIENT
Start: 2024-03-06 | End: 2024-03-09 | Stop reason: HOSPADM

## 2024-03-06 RX ORDER — NOREPINEPHRINE BITARTRATE 0.06 MG/ML
1-100 INJECTION, SOLUTION INTRAVENOUS CONTINUOUS
Status: DISCONTINUED | OUTPATIENT
Start: 2024-03-06 | End: 2024-03-08

## 2024-03-06 RX ORDER — INSULIN LISPRO 100 [IU]/ML
0-4 INJECTION, SOLUTION INTRAVENOUS; SUBCUTANEOUS NIGHTLY
Status: DISCONTINUED | OUTPATIENT
Start: 2024-03-06 | End: 2024-03-09 | Stop reason: HOSPADM

## 2024-03-06 RX ORDER — 0.9 % SODIUM CHLORIDE 0.9 %
250 INTRAVENOUS SOLUTION INTRAVENOUS ONCE
Status: COMPLETED | OUTPATIENT
Start: 2024-03-06 | End: 2024-03-06

## 2024-03-06 RX ADMIN — ACETAMINOPHEN 325MG 650 MG: 325 TABLET ORAL at 10:34

## 2024-03-06 RX ADMIN — APIXABAN 2.5 MG: 2.5 TABLET, FILM COATED ORAL at 20:07

## 2024-03-06 RX ADMIN — INSULIN LISPRO 8 UNITS: 100 INJECTION, SOLUTION INTRAVENOUS; SUBCUTANEOUS at 16:34

## 2024-03-06 RX ADMIN — Medication 10 ML: at 20:16

## 2024-03-06 RX ADMIN — Medication 5 MCG/MIN: at 06:18

## 2024-03-06 RX ADMIN — MIDODRINE HYDROCHLORIDE 10 MG: 10 TABLET ORAL at 11:32

## 2024-03-06 RX ADMIN — MIDODRINE HYDROCHLORIDE 10 MG: 10 TABLET ORAL at 09:01

## 2024-03-06 RX ADMIN — ARIPIPRAZOLE 5 MG: 5 TABLET ORAL at 10:35

## 2024-03-06 RX ADMIN — APIXABAN 2.5 MG: 2.5 TABLET, FILM COATED ORAL at 09:01

## 2024-03-06 RX ADMIN — HYDROXYZINE HYDROCHLORIDE 25 MG: 25 TABLET, FILM COATED ORAL at 20:08

## 2024-03-06 RX ADMIN — SODIUM CHLORIDE 250 ML: 9 INJECTION, SOLUTION INTRAVENOUS at 03:45

## 2024-03-06 RX ADMIN — HYDROXYZINE HYDROCHLORIDE 25 MG: 25 TABLET, FILM COATED ORAL at 10:35

## 2024-03-06 RX ADMIN — SERTRALINE 50 MG: 50 TABLET, FILM COATED ORAL at 20:07

## 2024-03-06 RX ADMIN — MIDODRINE HYDROCHLORIDE 10 MG: 10 TABLET ORAL at 16:34

## 2024-03-06 RX ADMIN — ONDANSETRON 4 MG: 2 INJECTION INTRAMUSCULAR; INTRAVENOUS at 07:22

## 2024-03-06 RX ADMIN — ALBUMIN (HUMAN) 25 G: 0.25 INJECTION, SOLUTION INTRAVENOUS at 04:26

## 2024-03-06 RX ADMIN — Medication 10 ML: at 09:01

## 2024-03-06 RX ADMIN — ONDANSETRON 4 MG: 2 INJECTION INTRAMUSCULAR; INTRAVENOUS at 16:12

## 2024-03-06 ASSESSMENT — PAIN SCALES - GENERAL
PAINLEVEL_OUTOF10: 0
PAINLEVEL_OUTOF10: 2
PAINLEVEL_OUTOF10: 0
PAINLEVEL_OUTOF10: 6
PAINLEVEL_OUTOF10: 0
PAINLEVEL_OUTOF10: 2

## 2024-03-06 ASSESSMENT — PAIN DESCRIPTION - LOCATION: LOCATION: BACK;BUTTOCKS

## 2024-03-06 ASSESSMENT — PAIN DESCRIPTION - ORIENTATION: ORIENTATION: MID

## 2024-03-06 NOTE — FLOWSHEET NOTE
0800 Assessment complete, see flow sheet. Patient A/O X4, following all commands, moving all extremities well. Offers no complaints of pain and or discomfort at present time.  0940 Seen by Critical Team on morning rounds, update given.  1300 Patient repositioned for comfort. No changes in assessment noted.  1800 patient had a moderate formed brown BM on the bed pan. Patient cleansed, linen changed, patient repositioned for comfort. No changes in assessment noted.

## 2024-03-06 NOTE — PROGRESS NOTES
Spiritual Care Services     Summary of Visit:    Participated in ICU team rounding, patient was alert and oriented, patient has HCPOA, on going spiritual health care        Encounter Summary  Encounter Overview/Reason : Initial Encounter, Interdisciplinary rounds  Service Provided For:: Patient  Referral/Consult From:: Multi-disciplinary team, Rounding  Support System: Children  Complexity of Encounter: Low  Begin Time: 0935  End Time : 0940  Total Time Calculated: 5 min                               Spiritual Assessment/Intervention/Outcomes:    Assessment: Concerns with suffering    Intervention: Sustaining Presence/Ministry of presence    Outcome: Receptive      Care Plan:    Plan and Referrals  Plan/Referrals: Continue to visit, (comment)          Spiritual Care Services   Electronically signed by Chaplain Galen on 3/6/2024 at 12:40 PM.    To reach a  for emotional and spiritual support, place an EPIC consult request.   If a  is needed immediately, dial “0” and ask to page the on-call .

## 2024-03-06 NOTE — PLAN OF CARE
Problem: Pain  Goal: Verbalizes/displays adequate comfort level or baseline comfort level  Outcome: Progressing     Problem: Skin/Tissue Integrity  Goal: Absence of new skin breakdown  Description: 1.  Monitor for areas of redness and/or skin breakdown  2.  Assess vascular access sites hourly  3.  Every 4-6 hours minimum:  Change oxygen saturation probe site  4.  Every 4-6 hours:  If on nasal continuous positive airway pressure, respiratory therapy assess nares and determine need for appliance change or resting period.  Outcome: Progressing     Problem: Safety - Adult  Goal: Free from fall injury  Outcome: Progressing     Problem: ABCDS Injury Assessment  Goal: Absence of physical injury  Outcome: Progressing     Problem: Risk for Elopement  Goal: Patient will not exit the unit/facility without proper excort  Outcome: Progressing  Flowsheets (Taken 3/5/2024 2223)  Nursing Interventions for Elopement Risk: Assist with personal care needs such as toileting, eating, dressing, as needed to reduce the risk of wandering

## 2024-03-06 NOTE — ACP (ADVANCE CARE PLANNING)
Advance Care Planning   Healthcare Decision Maker:    Primary Decision Maker: Angelina Fagan - Child - [phone]    Click here to complete Healthcare Decision Makers including selection of the Healthcare Decision Maker Relationship (ie \"Primary\").  Today we documented Decision Maker(s) consistent with Legal Next of Kin hierarchy.       If the relationship to the patient does NOT follow our state's Next of Kin hierarchy, the patient MUST complete an ACP Document to allow him/her to act on the patient's behalf. :

## 2024-03-06 NOTE — CONSULTS
(36.7 °C) (Oral)   Resp 16   Ht 1.702 m (5' 7\")   Wt 101.2 kg (223 lb)   LMP  (LMP Unknown)   SpO2 100%   BMI 34.93 kg/m²   24HR INTAKE/OUTPUT:    Intake/Output Summary (Last 24 hours) at 3/6/2024 1056  Last data filed at 3/5/2024 1840  Gross per 24 hour   Intake 500 ml   Output --   Net 500 ml     CURRENT PULSE OXIMETRY:  SpO2: 100 %  24HR PULSE OXIMETRY RANGE:  SpO2  Av.9 %  Min: 71 %  Max: 100 %    General appearance - alert, ill appearing, and in mild distress  Mental status - alert, oriented to person, place, and time  Eyes - pupils equal and reactive, bruises around her eyes  Nose - normal and patent, nasal cannula oxygen  Neck - supple, no significant adenopathy  Chest - Diminished to auscultation, no wheezes   Heart - normal rate, regular rhythm, normal S1, S2, no murmurs   Abdomen - soft, nontender, nondistended   Rectal - deferred, not clinically indicated  Neurological - alert, oriented, normal speech, no focal findings   Musculoskeletal - no joint tenderness   Extremities - peripheral pulses normal, present pedal edema   Skin - normal coloration and turgor, no rashes          DATA:    CBC:   Recent Labs     24  1600 24  0517   WBC 9.5 6.7   HGB 11.5* 10.1*   HCT 35.0* 30.4*    106*     BMP:    Recent Labs     24  1600 24  0517    140   K 3.5 3.7   CL 94* 95   CO2 31 31   BUN 10 14   CREATININE 3.73* 4.86*   GLUCOSE 95 77   CALCIUM 9.5 9.2     HEPATIC:   Recent Labs     24  1600   AST 17   ALT 13   BILITOT 1.1*   ALKPHOS 89     LACTATE: No results for input(s): \"LACTA\" in the last 72 hours.  PROCALCITONIN:   Recent Labs     24  0517   PROCAL 0.26*            Radiology Review:    CXR portable: Results for orders placed during the hospital encounter of 23    XR CHEST PORTABLE    Narrative  EXAMINATION:  ONE XRAY VIEW OF THE CHEST    2023 10:49 pm    COMPARISON:  Chest series from 2023    HISTORY:  ORDERING SYSTEM PROVIDED

## 2024-03-06 NOTE — SIGNIFICANT EVENT
Patient BP hypotensive throughout the night.  Initially responded somewhat to conservative 500cc IVF and 25g 25% albumin, but hypotension recurred before morning, failed to respond to additional 250cc IVF bolus and another dose of albumin, with post-infusion MAP of 53.  Transferring to ICU due to need for vasopressor support.    Electronically signed by Roxy Hendrix DO on 3/6/2024 at 5:38 AM

## 2024-03-06 NOTE — CONSULTS
fistula, left arm;  section; coronary angioplasties with stents; tunneled dialysis catheter through the chest in the past for dialysis; third right toe amputated, total right knee replacement.    MEDICATIONS:  At the time of her admission; Eliquis, metoprolol, ProAmatine, but prior to admission was only taking 10 mg 3 times per week, Imdur, Trelegy, insulin coverage, Proventil, Protonix, Nephrocaps, Abilify, Zoloft, and Lipitor.    ALLERGIES:  TO MEDICATIONS, CODEINE AND OXYCONTIN.      REVIEW OF SYSTEMS:  Weakness, but no distress at this time.    PHYSICAL EXAMINATION:  VITAL SIGNS:  5 feet 7 inches, 223 pounds.  Blood pressure is 120/35, heart rate 80, respirations 17, afebrile since admission.  The blood pressure on arrival to the hospital was 90/40.  HEENT: Normocephalic. Pupils reactive to light.  Extraocular muscles intact.  NECK:  Supple.  No JVD or adenopathy.    HEART: Regular with 1/6 systolic murmur.    LUNGS:  Relatively clear.  No wheezing, rales, or rhonchi.  No accessory muscle use.    ABDOMEN:  Soft, overweight, unable to determine visceromegaly.  Bowel sounds are present.  No pulsatile masses.  EXTREMITIES: Lower limbs show no edema.  Skin is warm and dry.  No calf pain.  No cyanosis.    IMPRESSION:    1. Hypotension related to probable poor cardiac function, likelihood of excessive fluid taken off with dialysis.  2. Coronary artery disease with history of heart failure, reduced ejection fraction.  3. Diabetes mellitus type 2.  4. Prior history of hypertension.  5. Schizophrenia.  6. Bladder incontinence.  7. Peripheral neuropathy from diabetes.  8. Anemia.    PLAN:  Dialysis on , , and . ProAmatine to be given twice daily, metoprolol-XL 25 mg daily for prevention of tachyarrhythmias.  Follow labs.  The patient is requesting nursing home placement once discharged. Telemetry in ICU today.  On Levophed and try to wean this off.          OMERO DONNELLY DO      D:   03/06/2024 11:48:57     T:  03/06/2024 16:09:04     JUDITH/JEREMIE  Job #:  [id number]     Doc#:  [id number]

## 2024-03-06 NOTE — PROGRESS NOTES
at all times  Hearing: Within functional limits    Cognition:  Overall Orientation Status: Within Functional Limits  Follows Commands: Within Functional Limits  Overall Cognitive Status: WFL    Observation/Palpation  Observation: Pt alert and attentive, no acute distress. On levo- per RN ok to mobilize within pt tolerance. Pt BP WFL throughout mobility (125/66) and pt asymptomatic. Pt on 2L O2, satting 98% throughout mobility. Bruising noted to R shoulder, R arm, dressing intact to LUE, bruising R cheek and back of head    ROM:  AROM: Generally decreased, functional    Strength:  Strength: Generally decreased, functional    Neuro:  Balance  Sitting - Static: Fair  Sitting - Dynamic: Fair;Poor  Standing - Static: Poor  Standing - Dynamic: Poor                      Tone: Normal    Sensation: Intact    Bed mobility  Rolling to Right: Maximum assistance  Supine to Sit: Maximum assistance;2 Person assistance  Sit to Supine: Maximum assistance  Scooting: Maximal assistance;2 Person assistance  Bed Mobility Comments: pt requires hand over hand assist to complete all transitions. Coaching in utilizing bed rails and sequencing. increaesd time and effort to complete.    Transfers  Sit to Stand: Moderate Assistance;2 Person Assistance  Stand to Sit: Moderate Assistance  Comment: B HHA to complete. LIfting assist to complete.    Ambulation  Surface: Level tile  Device: Hand-Held Assist (B HHA)  Assistance: Moderate assistance;2 Person assistance  Quality of Gait: Heavy bracing with UEs. Shortened step length. Slow pacing.  Distance: 4 side steps  Comments: Deferred further ambulation per RN request d/t Levo                   Activity Tolerance  Activity Tolerance: Patient tolerated treatment well    Patient Education  Education Given To: Patient  Education Provided: Role of Therapy;Plan of Care  Education Method: Verbal  Education Outcome: Verbalized understanding;Continued education needed       ASSESSMENT:   Body Structures,  does not require any physical supervision or assistance from another person for activity completion. Device may be needed.  Stand by assistance = pt requires verbal cues or instructions from another person, close to but not touching, to perform the activity  Minimal assistance= pt performs 75% or more of the activity; assistance is required to complete the activity  Moderate assistance= pt performs 50% of the activity; assistance is required to complete the activity  Maximal assistance = pt performs 25% of the activity; assistance is required to complete the activity  Dependent = pt requires total physical assistance to accomplish the task

## 2024-03-06 NOTE — PROGRESS NOTES
[facility] Hospitalist Progress Note    Admitting Date and Time: 3/5/2024  3:53 PM    Subjective:    Patient transferred to ICU overnight for pressor requirement. Denies any dizziness, lightheadedness, fever, or chills.        insulin lispro  0-4 Units SubCUTAneous TID WC    insulin lispro  0-4 Units SubCUTAneous Nightly    ARIPiprazole  5 mg Oral Daily    hydrOXYzine HCl  25 mg Oral BID    sertraline  50 mg Oral Nightly    apixaban  2.5 mg Oral BID    midodrine  10 mg Oral TID WC    sodium chloride flush  5-40 mL IntraVENous 2 times per day     glucose, 4 tablet, PRN  dextrose bolus, 125 mL, PRN   Or  dextrose bolus, 250 mL, PRN  glucagon (rDNA), 1 mg, PRN  dextrose, , Continuous PRN  sodium chloride flush, 5-40 mL, PRN  sodium chloride, , PRN  ondansetron, 4 mg, Q6H PRN  polyethylene glycol, 17 g, Daily PRN  acetaminophen, 650 mg, Q6H PRN  sodium chloride flush, 5-40 mL, PRN  senna, 1 tablet, Daily PRN         Objective:    BP (!) 111/55   Pulse 91   Temp 98 °F (36.7 °C) (Oral)   Resp 16   Ht 1.702 m (5' 7\")   Wt 101.2 kg (223 lb)   LMP  (LMP Unknown)   SpO2 100%   BMI 34.93 kg/m²     General Appearance: alert and oriented to person, place and time and in no acute distress  Skin: warm and dry  Head: normocephalic and atraumatic  Eyes: pupils equal, round, and reactive to light, extraocular eye movements intact, conjunctivae normal  Neck: neck supple and non tender without mass   Pulmonary/Chest: clear to auscultation bilaterally- no wheezes, rales or rhonchi, normal air movement, no respiratory distress  Cardiovascular: normal rate, normal S1 and S2 and no carotid bruits  Abdomen: soft, non-tender, non-distended, normal bowel sounds, no masses or organomegaly  Extremities: no cyanosis, no clubbing and no edema. Bruising present along L shoulder   Neurologic: no cranial nerve deficit and speech normal        Recent Labs     03/05/24  1600 03/06/24  0517    140   K 3.5 3.7   CL 94* 95   CO2 31  traumatic subluxation by CT imaging.         CT THORACIC SPINE WO CONTRAST   Final Result   1. No acute osseous abnormality involving the thoracic spine.   2. Stable compression fracture involving T11 with loss of height of   approximately 20%.             Assessment/Plan:      Hypotension requiring vasopressors - unclear etiology, largely normal TTE 1yr ago, no recent infectious symptoms. Improved initially w/ fluid resuscitation but transferred to ICU soon after admit for pressor requirement  -Continue Levophed for goal MAP > 65  -Midodrine 15 mg TID added  -Procal milldy elevated, no leukocytosis or fevers  -Intensivist following    Falls  -PT/OT  -Management as above for hypotension    Atrial flutter - noted on admission EKG  -Telemetry reviewed, no atrial flutter at time of my evaluation     ESRD on HD  -Neph following     T2DM w/ polyneuropathy  -SSI     Atrial fibrillation  -Continue Eliquis    Chronic diastolic HF  -Holding any medicines that can decrease blood pressure    Depression  -Continue Zoloft, Abilify       Electronically signed by Chan Rai MD on 3/6/2024 at 11:44 AM

## 2024-03-06 NOTE — PROGRESS NOTES
[facility] OCCUPATIONAL THERAPY EVALUATION - ACUTE     NAME: Michelle Le  : 1960 (64 y.o.)  MRN: [id number]  CODE STATUS: Full Code  [facility]    Date of Service: 3/6/2024    Patient Diagnosis(es): General weakness [R53.1]  Generalized weakness [R53.1]  Multiple contusions [T07.XXXA]  Recurrent falls [R29.6]  Multiple falls [R29.6]  Abdominal pain, unspecified abdominal location [R10.9]  Hypotension, unspecified hypotension type [I95.9]   Patient Active Problem List    Diagnosis Date Noted    Unstable angina (HCC) 2022    Chest pain     Infection of venous access port 2022    Lumbar stenosis with neurogenic claudication 07/15/2022    Falls infrequently 07/15/2022    Cervical stenosis of spinal canal     Ataxic gait     Chronic hepatitis C ([facility]) 07/10/2022    Catheter-related bloodstream infection     Enterococcus faecalis infection     Septicemia (HCC) 2022    Sarcopenia 2022    Fall from standing     DJD (degenerative joint disease), cervical 2022    Closed head injury     MRSA bacteremia 2022    Sepsis due to Enterococcus ([facility])     Local infection due to central venous catheter     Impaired mobility and activities of daily living dt polyneuropathy and reccent fall  2022    Closed T11 fracture ([facility]) 2022    Encephalopathy acute 2022    Unspecified open wound of right upper arm, initial encounter 2022    Hyperkalemia 2022    CKD (chronic kidney disease) stage 4, GFR 15-29 ml/min ([facility]) 2022    Generalized weakness 2024    Cellulitis of left hand 2023    Chronic osteomyelitis of knee ([facility]) 2023    AMS (altered mental status) 2023    Acute decompensated heart failure ([facility]) 2023    Interstitial lung disease ([facility]) 2023    Eczema 10/25/2023    Diarrhea 10/19/2023    Anemia, unspecified 10/19/2023    Dialysis patient ([facility]) 2022    Multiple closed fractures of right lower extremity and

## 2024-03-06 NOTE — PLAN OF CARE
See OT evaluation for all goals and OT POC. Electronically signed by Malou Cochran OTR/L on 3/6/2024 at 11:14 AM

## 2024-03-06 NOTE — CONSULTS
Renal consult dictated  refer to [facility] tomorrow  ECHO today Hx AF controlled with b-Blocker also recent;y changed yesterday as out-patient to proamitine 10mg bid never got started now on levo for BP ESRDx IHD t-Th-S

## 2024-03-06 NOTE — PROGRESS NOTES
PHARMACY NOTE:   Interdisciplinary Rounds Completed     ICU Day #1  Pt diagnosis: weakness    Follow up/Changes:       Home psych meds reordered - Abilify, Zoloft, hydroxyzine   Lopressor removed from home med list - has not taken since 2023  Monitor BG closely - patient on both Lantus and Humalog at home, though unclear on dose based on dispense history/patient report. Currently only on low dose SSI   Home meds in need of review/reorder as appropriate: sevelamer, nephrocaps    Renal:      Recent Labs     24  1600 24  0517   CREATININE 3.73* 4.86*      Estimated Creatinine Clearance: 14 mL/min (A) (based on SCr of 4.86 mg/dL (H)).     Additional Information/Core Measures:      DVT Prophylaxis/Anticoagulant Therapy: Eliquis 2.5 mg BID  Recent Labs     24  1600 24  0517   HGB 11.5* 10.1*    106*     Recent Labs     24  1600   INR 1.5     Stress Ulcer Prophylaxis:  [] Pantoprazole    [] Famotidine   Steroid:   --  Insulin Coverage (goal: 140-180):   Recent Labs     24  1600 24  0517   GLUCOSE 95 77     Lantus: --  SSI: --  Humalo units required in the past 24 hours  Pressors:  [x] Levophed  [] Epinephrine  [] Vasopressin  [] Matteo-Synephrine  Sedation:  [] Precedex  [] Fentanyl  [] Propofol  Fluids:  --  Drips:  --  Antimicrobial Therapy:  Recent Labs     24  1600 24  0517   WBC 9.5 6.7     Recent Labs     24  0517   PROCAL 0.26*     ID on consult: No  Antimicrobial agents:   --  Cultures: none this admission  Bowel Regimen:   [x] Miralax   [x] Senokot  [] Colace   Core measures assessed/met    Margaux Jones, PharmD  3/6/2024 2:18 PM

## 2024-03-06 NOTE — PROGRESS NOTES
Patient's BP was hypotensive, was given a 500 and 250 Bolus of NS, and 2 albumins throughout the night, problem didn't resolve so an order was given from the physician to send her to ICU.

## 2024-03-06 NOTE — CARE COORDINATION
Case Management Assessment  Initial Evaluation    Date/Time of Evaluation: 3/6/2024 8:39 AM  Assessment Completed by: HIPOLITO Rendon    If patient is discharged prior to next notation, then this note serves as note for discharge by case management.    Patient Name: Michelle Le                   YOB: 1960  Diagnosis: General weakness [R53.1]  Generalized weakness [R53.1]  Multiple contusions [T07.XXXA]  Recurrent falls [R29.6]  Multiple falls [R29.6]  Abdominal pain, unspecified abdominal location [R10.9]  Hypotension, unspecified hypotension type [I95.9]                   Date / Time: 3/5/2024  3:53 PM    Patient Admission Status: Inpatient   Readmission Risk (Low < 19, Mod (19-27), High > 27): Readmission Risk Score: 21.6    Current PCP: Adilene Terry MD  PCP verified by CM? Yes    Chart Reviewed: Yes      History Provided by: Patient  Patient Orientation: Alert and Oriented    Patient Cognition: Alert    Hospitalization in the last 30 days (Readmission):  No    If yes, Readmission Assessment in CM Navigator will be completed.    Advance Directives:      Code Status: Full Code   Patient's Primary Decision Maker is: Named in Scanned ACP Document    Primary Decision Maker: HermanAngelina little - Child - [phone]    Discharge Planning:    Patient lives with: Children Type of Home: House  Primary Care Giver: Self  Patient Support Systems include: Children, Family Members   Current Financial resources: Medicare, Medicaid  Current community resources: ECF/Home Care, Other (Comment) ([facility] 56 HOURS OF AID SERVICES)  Current services prior to admission: None            Current DME:              Type of Home Care services:  None    ADLS  Prior functional level: Assistance with the following:, Bathing, Toileting, Dressing, Cooking, Housework, Shopping, Mobility  Current functional level: Assistance with the following:, Bathing, Dressing, Toileting, Cooking, Housework, Shopping,

## 2024-03-07 LAB
ALBUMIN SERPL-MCNC: 4 G/DL (ref 3.5–4.6)
ALP SERPL-CCNC: 85 U/L (ref 40–130)
ALT SERPL-CCNC: 14 U/L (ref 0–33)
ANION GAP SERPL CALCULATED.3IONS-SCNC: 13 MEQ/L (ref 9–15)
AST SERPL-CCNC: 17 U/L (ref 0–35)
BILIRUB SERPL-MCNC: 1.1 MG/DL (ref 0.2–0.7)
BUN SERPL-MCNC: 24 MG/DL (ref 8–23)
CALCIUM SERPL-MCNC: 9.1 MG/DL (ref 8.5–9.9)
CHLORIDE SERPL-SCNC: 91 MEQ/L (ref 95–107)
CO2 SERPL-SCNC: 27 MEQ/L (ref 20–31)
CREAT SERPL-MCNC: 5.81 MG/DL (ref 0.5–0.9)
ERYTHROCYTE [DISTWIDTH] IN BLOOD BY AUTOMATED COUNT: 15.9 % (ref 11.5–14.5)
GLOBULIN SER CALC-MCNC: 2.8 G/DL (ref 2.3–3.5)
GLUCOSE BLD-MCNC: 125 MG/DL (ref 70–99)
GLUCOSE BLD-MCNC: 175 MG/DL (ref 70–99)
GLUCOSE BLD-MCNC: 186 MG/DL (ref 70–99)
GLUCOSE BLD-MCNC: 229 MG/DL (ref 70–99)
GLUCOSE BLD-MCNC: 233 MG/DL (ref 70–99)
GLUCOSE SERPL-MCNC: 202 MG/DL (ref 70–99)
HCT VFR BLD AUTO: 33.2 % (ref 37–47)
HGB BLD-MCNC: 11.2 G/DL (ref 12–16)
MCH RBC QN AUTO: 33.2 PG (ref 27–31.3)
MCHC RBC AUTO-ENTMCNC: 33.7 % (ref 33–37)
MCV RBC AUTO: 98.5 FL (ref 79.4–94.8)
PERFORMED ON: ABNORMAL
PLATELET # BLD AUTO: 135 K/UL (ref 130–400)
POTASSIUM SERPL-SCNC: 4.5 MEQ/L (ref 3.4–4.9)
POTASSIUM SERPL-SCNC: 4.5 MEQ/L (ref 3.4–4.9)
PROT SERPL-MCNC: 6.8 G/DL (ref 6.3–8)
RBC # BLD AUTO: 3.37 M/UL (ref 4.2–5.4)
SODIUM SERPL-SCNC: 131 MEQ/L (ref 135–144)
WBC # BLD AUTO: 9.2 K/UL (ref 4.8–10.8)

## 2024-03-07 PROCEDURE — 6370000000 HC RX 637 (ALT 250 FOR IP): Performed by: NURSE PRACTITIONER

## 2024-03-07 PROCEDURE — 2580000003 HC RX 258

## 2024-03-07 PROCEDURE — 94660 CPAP INITIATION&MGMT: CPT

## 2024-03-07 PROCEDURE — 6370000000 HC RX 637 (ALT 250 FOR IP)

## 2024-03-07 PROCEDURE — 6360000002 HC RX W HCPCS: Performed by: INTERNAL MEDICINE

## 2024-03-07 PROCEDURE — 85027 COMPLETE CBC AUTOMATED: CPT

## 2024-03-07 PROCEDURE — 90935 HEMODIALYSIS ONE EVALUATION: CPT

## 2024-03-07 PROCEDURE — 36415 COLL VENOUS BLD VENIPUNCTURE: CPT

## 2024-03-07 PROCEDURE — 2000000000 HC ICU R&B

## 2024-03-07 PROCEDURE — 2700000000 HC OXYGEN THERAPY PER DAY

## 2024-03-07 PROCEDURE — 5A09357 ASSISTANCE WITH RESPIRATORY VENTILATION, LESS THAN 24 CONSECUTIVE HOURS, CONTINUOUS POSITIVE AIRWAY PRESSURE: ICD-10-PCS | Performed by: FAMILY MEDICINE

## 2024-03-07 PROCEDURE — 6360000002 HC RX W HCPCS

## 2024-03-07 PROCEDURE — 80053 COMPREHEN METABOLIC PANEL: CPT

## 2024-03-07 PROCEDURE — 99291 CRITICAL CARE FIRST HOUR: CPT | Performed by: INTERNAL MEDICINE

## 2024-03-07 RX ORDER — PANTOPRAZOLE SODIUM 40 MG/1
40 TABLET, DELAYED RELEASE ORAL
Status: DISCONTINUED | OUTPATIENT
Start: 2024-03-08 | End: 2024-03-09 | Stop reason: HOSPADM

## 2024-03-07 RX ORDER — BUDESONIDE AND FORMOTEROL FUMARATE DIHYDRATE 160; 4.5 UG/1; UG/1
2 AEROSOL RESPIRATORY (INHALATION)
Status: DISCONTINUED | OUTPATIENT
Start: 2024-03-07 | End: 2024-03-09 | Stop reason: HOSPADM

## 2024-03-07 RX ORDER — METOCLOPRAMIDE HYDROCHLORIDE 5 MG/ML
10 INJECTION INTRAMUSCULAR; INTRAVENOUS ONCE
Status: COMPLETED | OUTPATIENT
Start: 2024-03-07 | End: 2024-03-07

## 2024-03-07 RX ORDER — METOCLOPRAMIDE HYDROCHLORIDE 5 MG/ML
10 INJECTION INTRAMUSCULAR; INTRAVENOUS EVERY 8 HOURS
Status: DISCONTINUED | OUTPATIENT
Start: 2024-03-07 | End: 2024-03-09 | Stop reason: HOSPADM

## 2024-03-07 RX ADMIN — METOCLOPRAMIDE HYDROCHLORIDE 10 MG: 5 INJECTION INTRAMUSCULAR; INTRAVENOUS at 05:03

## 2024-03-07 RX ADMIN — MIDODRINE HYDROCHLORIDE 15 MG: 10 TABLET ORAL at 16:40

## 2024-03-07 RX ADMIN — METOCLOPRAMIDE HYDROCHLORIDE 10 MG: 5 INJECTION INTRAMUSCULAR; INTRAVENOUS at 10:15

## 2024-03-07 RX ADMIN — HYDROXYZINE HYDROCHLORIDE 25 MG: 25 TABLET, FILM COATED ORAL at 20:35

## 2024-03-07 RX ADMIN — ACETAMINOPHEN 325MG 650 MG: 325 TABLET ORAL at 01:50

## 2024-03-07 RX ADMIN — SERTRALINE 50 MG: 50 TABLET, FILM COATED ORAL at 20:35

## 2024-03-07 RX ADMIN — ONDANSETRON 4 MG: 2 INJECTION INTRAMUSCULAR; INTRAVENOUS at 07:10

## 2024-03-07 RX ADMIN — APIXABAN 2.5 MG: 2.5 TABLET, FILM COATED ORAL at 20:35

## 2024-03-07 RX ADMIN — Medication 10 ML: at 20:36

## 2024-03-07 RX ADMIN — APIXABAN 2.5 MG: 2.5 TABLET, FILM COATED ORAL at 08:51

## 2024-03-07 RX ADMIN — MIDODRINE HYDROCHLORIDE 10 MG: 10 TABLET ORAL at 08:50

## 2024-03-07 RX ADMIN — ONDANSETRON 4 MG: 2 INJECTION INTRAMUSCULAR; INTRAVENOUS at 01:58

## 2024-03-07 RX ADMIN — ONDANSETRON 4 MG: 2 INJECTION INTRAMUSCULAR; INTRAVENOUS at 14:35

## 2024-03-07 RX ADMIN — METOCLOPRAMIDE HYDROCHLORIDE 10 MG: 5 INJECTION INTRAMUSCULAR; INTRAVENOUS at 18:00

## 2024-03-07 RX ADMIN — HYDROXYZINE HYDROCHLORIDE 25 MG: 25 TABLET, FILM COATED ORAL at 08:51

## 2024-03-07 RX ADMIN — MIDODRINE HYDROCHLORIDE 15 MG: 10 TABLET ORAL at 11:31

## 2024-03-07 RX ADMIN — ARIPIPRAZOLE 5 MG: 5 TABLET ORAL at 08:51

## 2024-03-07 RX ADMIN — Medication 10 ML: at 08:50

## 2024-03-07 RX ADMIN — ONDANSETRON 4 MG: 2 INJECTION INTRAMUSCULAR; INTRAVENOUS at 20:36

## 2024-03-07 ASSESSMENT — PAIN SCALES - GENERAL
PAINLEVEL_OUTOF10: 0
PAINLEVEL_OUTOF10: 3
PAINLEVEL_OUTOF10: 0

## 2024-03-07 ASSESSMENT — PAIN DESCRIPTION - LOCATION: LOCATION: OTHER (COMMENT)

## 2024-03-07 ASSESSMENT — PAIN DESCRIPTION - DESCRIPTORS: DESCRIPTORS: ACHING

## 2024-03-07 NOTE — PROGRESS NOTES
[facility] Hospitalist Progress Note    Admitting Date and Time: 3/5/2024  3:53 PM    Subjective:    Patient remains on levophed. Denies any dizziness, lightheadedness, fever, or chills.        metoclopramide  10 mg IntraVENous q8h    ARIPiprazole  5 mg Oral Daily    hydrOXYzine HCl  25 mg Oral BID    sertraline  50 mg Oral Nightly    [Held by provider] metoprolol succinate  25 mg Oral Daily    insulin lispro  0-16 Units SubCUTAneous TID WC    insulin lispro  0-4 Units SubCUTAneous Nightly    apixaban  2.5 mg Oral BID    midodrine  10 mg Oral TID WC    sodium chloride flush  5-40 mL IntraVENous 2 times per day     glucose, 4 tablet, PRN  dextrose bolus, 125 mL, PRN   Or  dextrose bolus, 250 mL, PRN  glucagon (rDNA), 1 mg, PRN  dextrose, , Continuous PRN  sodium chloride flush, 5-40 mL, PRN  sodium chloride, , PRN  ondansetron, 4 mg, Q6H PRN  polyethylene glycol, 17 g, Daily PRN  acetaminophen, 650 mg, Q6H PRN  sodium chloride flush, 5-40 mL, PRN  senna, 1 tablet, Daily PRN         Objective:    BP (!) 118/46   Pulse 64   Temp 98.6 °F (37 °C) (Oral)   Resp 19   Ht 1.702 m (5' 7.01\")   Wt 101.2 kg (223 lb)   LMP  (LMP Unknown)   SpO2 96%   BMI 34.92 kg/m²     General Appearance: alert and oriented to person, place and time and in no acute distress  Skin: warm and dry  Head: normocephalic and atraumatic  Eyes: pupils equal, round, and reactive to light, extraocular eye movements intact, conjunctivae normal  Neck: neck supple and non tender without mass   Pulmonary/Chest: clear to auscultation bilaterally- no wheezes, rales or rhonchi, normal air movement, no respiratory distress  Cardiovascular: normal rate, normal S1 and S2 and no carotid bruits  Abdomen: soft, non-tender, non-distended, normal bowel sounds, no masses or organomegaly  Extremities: no cyanosis, no clubbing and no edema. Bruising present along L shoulder   Neurologic: no cranial nerve deficit and speech normal        Recent Labs

## 2024-03-07 NOTE — PROGRESS NOTES
Pulmonary & Critical Care Medicine ICU Progress Note  Chief complaint : Hypotension     Subjunctive/24 hour events :   Patient seen and examined during multidisciplinary rounds with RN, charge nurse, RT, pharmacy, dietitian, and social service.   Patient complaining of nausea overnight,and this am. Zofran does not help her symptoms. She is on room air and O2 sats are 96%. Levophed is at 2 mcg/min, increased during dialysis. No fever overnight and anuric. Last Bm 3/6/2024. Tolerating a po diet.       Social History     Tobacco Use    Smoking status: Never     Passive exposure: Never    Smokeless tobacco: Never   Substance Use Topics    Alcohol use: No     Alcohol/week: 0.0 standard drinks of alcohol         Problem Relation Age of Onset    Cancer Mother 68        survived    Breast Cancer Mother     Hypertension Father     Diabetes Sister     Mental Illness Sister     Colon Cancer Neg Hx        No results for input(s): \"PHART\", \"OBR7FBF\", \"PO2ART\" in the last 72 hours.    MV Settings:     / / /            IV:   norepinephrine 2 mcg/min (03/07/24 0540)    dextrose      sodium chloride         Vitals:  BP (!) 118/46   Pulse 64   Temp 98.6 °F (37 °C) (Oral)   Resp 19   Ht 1.702 m (5' 7.01\")   Wt 101.2 kg (223 lb)   LMP  (LMP Unknown)   SpO2 96%   BMI 34.92 kg/m²    Tmax:       Intake/Output Summary (Last 24 hours) at 3/7/2024 1051  Last data filed at 3/7/2024 0540  Gross per 24 hour   Intake 1494.61 ml   Output --   Net 1494.61 ml       EXAM:    General: alert, fatigued  Head: normocephalic, atraumatic  Eyes:No gross abnormalities.  ENT:  MMM no lesions  Neck:  supple and no masses  Chest : Fair air movement, no rales no wheezes   Heart:: Heart sounds are normal.  Regular rate and rhythm without murmur, gallop or rub.  ABD:  bowel sounds normal, soft, non-tender, nausea   Musculoskeletal : no cyanosis, no clubbing, and no edema  Neuro:  Grossly normal  Skin: No rashes or nodules noted.  Lymph node:  no cervical  reasonably achievable. COMPARISON: None. HISTORY: ORDERING SYSTEM PROVIDED HISTORY: fall with abdominal pain TECHNOLOGIST PROVIDED HISTORY: Reason for exam:->fall with abdominal pain Additional Contrast?->None Decision Support Exception - unselect if not a suspected or confirmed emergency medical condition->Emergency Medical Condition (MA) What reading provider will be dictating this exam?->CRC FINDINGS: There is small to moderate ascites throughout abdomen and pelvis which has increased.  No bowel obstruction or free air.  The appendix is not definitively visualized, however no focal inflammatory changes in its expected location.  Liver is grossly unremarkable.  Calcified gallstones are seen in the gallbladder.  No evidence of acute pancreatitis.  Spleen is at upper limits of normal in size.  Adrenal glands are unremarkable.  No hydronephrosis or perinephric edema.  There is mild to moderate symmetric atrophy involving both kidneys.  No retroperitoneal lymphadenopathy.  Urinary bladder is contracted.  No evidence of pelvis fracture.  No hip fracture or dislocation.  There is a stable chronic compression fracture involving T11.     1. Small to moderate ascites which has increased. 2. No bowel obstruction or free air. 3. Cholelithiasis.     CT LUMBAR SPINE WO CONTRAST    Result Date: 3/5/2024  EXAMINATION: CT OF THE LUMBAR SPINE WITHOUT CONTRAST  3/5/2024 TECHNIQUE: CT of the lumbar spine was performed without the administration of intravenous contrast. Multiplanar reformatted images are provided for review. Adjustment of mA and/or kV according to patient size was utilized.  Automated exposure control, iterative reconstruction, and/or weight based adjustment of the mA/kV was utilized to reduce the radiation dose to as low as reasonably achievable. COMPARISON: None HISTORY: ORDERING SYSTEM PROVIDED HISTORY: fall with back pain TECHNOLOGIST PROVIDED HISTORY: recons Reason for exam:->fall with back pain Decision Support

## 2024-03-07 NOTE — PROGRESS NOTES
PHARMACY NOTE:   Interdisciplinary Rounds Completed     ICU Day #2  Pt diagnosis: weakness    Follow up/Changes:       No changes made during rounds today  Home meds in need of review/reorder as appropriate: sevelamer, nephrocaps    Renal:      Recent Labs     24  1600 24  0524  042   CREATININE 3.73* 4.86* 5.81*      Estimated Creatinine Clearance: 12 mL/min (A) (based on SCr of 5.81 mg/dL (H)).     Additional Information/Core Measures:      DVT Prophylaxis/Anticoagulant Therapy: Eliquis 2.5 mg BID  Recent Labs     24  1600 24  0524   HGB 11.5* 10.1* 11.2*    106* 135     Recent Labs     24  1600   INR 1.5     Stress Ulcer Prophylaxis:  [] Pantoprazole    [] Famotidine   Steroid:   --  Insulin Coverage (goal: 140-180):   Recent Labs     24  1600 24   GLUCOSE 95 77 202*   Lantus: --  SSI: High  Humalo units required in the past 24 hours  Pressors:  [x] Levophed  [] Epinephrine  [] Vasopressin  [] Matteo-Synephrine  Sedation:  [] Precedex  [] Fentanyl  [] Propofol  Fluids:  --  Drips:  --  Antimicrobial Therapy:  Recent Labs     24  1600 24  0524   WBC 9.5 6.7 9.2     Recent Labs     24   PROCAL 0.26*     ID on consult: No  Antimicrobial agents:   --  Cultures: none this admission  Bowel Regimen:   [x] Miralax   [x] Senokot  [] Colace   Core measures assessed/met    Margaux Jones, PharmD  3/7/2024 1:14 PM

## 2024-03-07 NOTE — PROGRESS NOTES
Critical Care Progress Note    3/7/2024 1:18 PM    Subjective:   Admit Date: 3/5/2024  PCP: Adilene Terry MD    No chief complaint on file.    Interval History: Has been on Levophed at 3 mcg.  Currently undergoing dialysis.  Has been nauseous and vomited.       Medications:   Scheduled Meds:   metoclopramide  10 mg IntraVENous q8h    midodrine  15 mg Oral TID WC    [START ON 3/8/2024] pantoprazole  40 mg Oral QAM AC    budesonide-formoterol  2 puff Inhalation BID RT    tiotropium  2 puff Inhalation Daily RT    ARIPiprazole  5 mg Oral Daily    hydrOXYzine HCl  25 mg Oral BID    sertraline  50 mg Oral Nightly    [Held by provider] metoprolol succinate  25 mg Oral Daily    insulin lispro  0-16 Units SubCUTAneous TID WC    insulin lispro  0-4 Units SubCUTAneous Nightly    apixaban  2.5 mg Oral BID    sodium chloride flush  5-40 mL IntraVENous 2 times per day     Continuous Infusions:   norepinephrine 2 mcg/min (24 0540)    dextrose      sodium chloride           Objective:   Vitals:   Temp (24hrs), Av.5 °F (36.9 °C), Min:97.9 °F (36.6 °C), Max:98.9 °F (37.2 °C)    BP (!) 110/48   Pulse 73   Temp 98.3 °F (36.8 °C) (Oral)   Resp 19   Ht 1.702 m (5' 7.01\")   Wt 101.2 kg (223 lb)   LMP  (LMP Unknown)   SpO2 90%   BMI 34.92 kg/m²   I/O:24HR INTAKE/OUTPUT:    Intake/Output Summary (Last 24 hours) at 3/7/2024 1318  Last data filed at 3/7/2024 1109  Gross per 24 hour   Intake 1894.61 ml   Output 1900 ml   Net -5.39 ml        Physical Exam:   General appearance - alert, ill appearing, and in mild distress  Mental status - alert, oriented to person, place, and time  Eyes - pupils equal and reactive, bruises around her eyes  Nose - normal and patent, nasal cannula oxygen  Neck - supple, no significant adenopathy  Chest - Diminished to auscultation, no wheezes   Heart - normal rate, regular rhythm, normal S1, S2, no murmurs   Abdomen - soft, nontender, nondistended   Rectal - deferred, not clinically

## 2024-03-07 NOTE — PROGRESS NOTES
HD X 3.5 HOURS FOR TUF 1.5L. PT TOLERATED TX WELL.POST DIALYSIS REPORT GIVEN TO JAE ANTUNEZ RN. PT STABLE AT THIS TIME.

## 2024-03-07 NOTE — FLOWSHEET NOTE
0800 Assessment complete, see flow sheet. Patient A/O X4, following all commands. Dialysis at bedside at present time, patient resting without complaints.  0950 Seen by Dr Almazan on morning rounds, update given, see orders.  1130 Dialysis complete, patient tolerated well.  1430 Patient vomited a small amount of undigested food, C/O nausea, medicated with prn Zofran, see MAR.  1500 Patient incont of moderate amount of soft brown stool, patient cleansed, linen changed and patient repositioned for comfort. Resting without complaints at present time.  1630 Levo off at present time will cont to monitor. See MAR. No changes in assessment at present time.

## 2024-03-07 NOTE — PROGRESS NOTES
Spiritual Care Services     Summary of Visit:   participated in ICU rounds. Patient undergoing dialysis. No immediate spiritual care noted.    Encounter Summary  Encounter Overview/Reason : Interdisciplinary rounds  Service Provided For:: Patient  Referral/Consult From:: Multi-disciplinary team, Rounding  Support System: Children  Complexity of Encounter: Low  Begin Time: 0935  End Time : 0940  Total Time Calculated: 5 min                               Spiritual Assessment/Intervention/Outcomes:    Assessment: Concerns with suffering    Intervention: Sustaining Presence/Ministry of presence    Outcome: Receptive      Care Plan:    Plan and Referrals  Plan/Referrals: Continue to visit, (comment)     to provide spiritual support as needed or requested      Spiritual Care Services   Electronically signed by Chaplain Pearl on 3/7/2024 at 11:07 AM.    To reach a  for emotional and spiritual support, place an EPIC consult request.   If a  is needed immediately, dial “0” and ask to page the on-call .

## 2024-03-07 NOTE — PROGRESS NOTES
Shift Summary:  Patient able to rest more overnight, levophed weaned throughout night as able to support MAP above 65, patient afebrile, no bowel movement, patient received full bed bath, hair washed, oral care done, linens/gown/cardiac leads and pulse oximeter all changed. Patient received 1 prn dose of zofran and tylenol, tylenol was given after bath as patient stated all the bruising on her body hurts with rolling around with linen changes and it made her nauseous,patient stated zofran helped. Around 0500: patient awoke from sleep feeling nauseous again, this RN reached out to  regarding nausea and not able to give zofran, received order for reglan. Patient responded well to reglan and denied nausea after receiving medication.

## 2024-03-07 NOTE — CARE COORDINATION
Quality round completed with care management team. Pt currently getting dialysis treatment at this time. Pt would like to go to [facility] at [facility].   Referral sent to [facility]. [facility] currently following pt.     DC plan: [facility] (no pre-cert need).     LSW will follow.     Electronically signed by HIPOLITO Rendon on 3/7/2024 at 10:19 AM

## 2024-03-07 NOTE — PROGRESS NOTES
Nephrology Progress Note    Assessment:  ESRDX  Hypotension  DM type-2  ASVD CAD  Schizophrenia history  Anemia      Plan:  dialysis today  alert     Patient Active Problem List:     Atherosclerotic heart disease of native coronary artery with unspecified angina pectoris (HCC)     Schizophrenia, paranoid, chronic     Metabolic syndrome     Vitamin B 12 deficiency     Cerebral microvascular disease     Mixed hyperlipidemia     Other hammer toe (acquired)     Vitamin D insufficiency     Incontinence of urine     Diabetic nephropathy with proteinuria (HCC)     Essential (primary) hypertension     History of type C viral hepatitis     Urinary incontinence due to cognitive impairment     History of seizures     Stented coronary artery-plan is to stay on Plavix indefinately per Dr Walker     Inadequately controlled diabetes mellitus ([facility])     Controlled type 2 diabetes mellitus with diabetic neuropathy, with long-term current use of insulin ([facility])     Hemiparesis, left ([facility])     Angina, class II ([facility])     Pain, unspecified     Tardive dyskinesia     Shortness of breath     Uncontrolled type 2 diabetes mellitus with hyperglycemia ([facility])     Chronic diastolic congestive heart failure ([facility])     [place] (obstructive sleep apnea)     Pulmonary hypertension, unspecified ([facility])     Class 2 severe obesity with serious comorbidity and body mass index (BMI) of 36.0 to 36.9 in adult ([facility])     Edema     Closed supracondylar fracture of right humerus     Other chronic pain     Palliative care patient     Recurrent falls     Renal failure     Difficulty in walking     End-stage renal disease on hemodialysis ([facility])     Weakness     Other seizures ([facility])     Moderate persistent asthma without complication     COVID-19     Post PTCA     Falls frequently     Chest wall contusion, left, initial encounter     Headache, unspecified     Paranoid schizophrenia ([facility])     Compression fracture of spine ([facility])     Closed rib fracture     Depression      rhythm, no murmurs or rubs  Abdomen: soft, non-tender, non-distended  Ext: no cyanosis, no peripheral edema  Neuro: alert and oriented, no gross abnormalities  Psych: normal mood and affect  Skin: no rash      Electronically signed by Jaden Finch DO

## 2024-03-07 NOTE — PROGRESS NOTES
Spiritual Care Services     Summary of Visit:  Rapid response called for pt. Pt intubated and sedated. Pt has a POA form in her EMR listing her dtr Paulina as her POA with Brennen Danielson as a secondary decision maker. No family here presently. Pt is Jain.     Encounter Summary  Encounter Overview/Reason : Crisis  Service Provided For:: Patient  Referral/Consult From:: Multi-disciplinary team  Support System: Children  Last Encounter : 03/06/24  Complexity of Encounter: Low  Begin Time: 1615  End Time : 1630  Total Time Calculated: 15 min     Crisis  Type: Rapid Response        Spiritual Assessment/Intervention/Outcomes:    Assessment: Unable to assess    Intervention: Sustaining Presence/Ministry of presence    Outcome: Did not respond      Care Plan:    Plan and Referrals  Plan/Referrals: Continue to visit, (comment)    Spiritual Care Services   Electronically signed by LUKAS Rosenthal on 3/7/2024 at 4:21 PM.    To reach a  for emotional and spiritual support, place an EPIC consult request.   If a  is needed immediately, dial “0” and ask to page the on-call .

## 2024-03-07 NOTE — PROGRESS NOTES
Physical Therapy Missed Treatment   Facility/Department: [facility] MED SURG IC14/IC14-01    NAME: Michelle eL  Patient Status:   : 1960 (64 y.o.)  MRN: [id number]  Account: [id number]  Gender: female        [] Patient Declines PT Treatment            [x] Patient Unavailable:     Pt going to dialysis at 1130 and is not available for PT tx.   Will attempt PT Treatment again at earliest convenience.        Electronically signed by Kisha Parkinson PTA on 3/7/24 at 11:45 AM EST

## 2024-03-08 ENCOUNTER — APPOINTMENT (OUTPATIENT)
Dept: CT IMAGING | Age: 64
DRG: 312 | End: 2024-03-08
Payer: MEDICARE

## 2024-03-08 LAB
ANION GAP SERPL CALCULATED.3IONS-SCNC: 19 MEQ/L (ref 9–15)
BUN SERPL-MCNC: 20 MG/DL (ref 8–23)
CALCIUM SERPL-MCNC: 9.3 MG/DL (ref 8.5–9.9)
CHLORIDE SERPL-SCNC: 89 MEQ/L (ref 95–107)
CO2 SERPL-SCNC: 25 MEQ/L (ref 20–31)
CREAT SERPL-MCNC: 4.34 MG/DL (ref 0.5–0.9)
ERYTHROCYTE [DISTWIDTH] IN BLOOD BY AUTOMATED COUNT: 16 % (ref 11.5–14.5)
GLUCOSE BLD-MCNC: 212 MG/DL (ref 70–99)
GLUCOSE BLD-MCNC: 227 MG/DL (ref 70–99)
GLUCOSE BLD-MCNC: 256 MG/DL (ref 70–99)
GLUCOSE BLD-MCNC: 420 MG/DL (ref 70–99)
GLUCOSE SERPL-MCNC: 237 MG/DL (ref 70–99)
HCT VFR BLD AUTO: 37.6 % (ref 37–47)
HGB BLD-MCNC: 12.5 G/DL (ref 12–16)
MCH RBC QN AUTO: 33.2 PG (ref 27–31.3)
MCHC RBC AUTO-ENTMCNC: 33.2 % (ref 33–37)
MCV RBC AUTO: 99.7 FL (ref 79.4–94.8)
PERFORMED ON: ABNORMAL
PLATELET # BLD AUTO: 134 K/UL (ref 130–400)
POTASSIUM SERPL-SCNC: 4.6 MEQ/L (ref 3.4–4.9)
RBC # BLD AUTO: 3.77 M/UL (ref 4.2–5.4)
SODIUM SERPL-SCNC: 133 MEQ/L (ref 135–144)
WBC # BLD AUTO: 11.1 K/UL (ref 4.8–10.8)

## 2024-03-08 PROCEDURE — 94761 N-INVAS EAR/PLS OXIMETRY MLT: CPT

## 2024-03-08 PROCEDURE — 80048 BASIC METABOLIC PNL TOTAL CA: CPT

## 2024-03-08 PROCEDURE — 2580000003 HC RX 258

## 2024-03-08 PROCEDURE — 6370000000 HC RX 637 (ALT 250 FOR IP): Performed by: INTERNAL MEDICINE

## 2024-03-08 PROCEDURE — 6360000002 HC RX W HCPCS: Performed by: INTERNAL MEDICINE

## 2024-03-08 PROCEDURE — 94640 AIRWAY INHALATION TREATMENT: CPT

## 2024-03-08 PROCEDURE — 36415 COLL VENOUS BLD VENIPUNCTURE: CPT

## 2024-03-08 PROCEDURE — 71250 CT THORAX DX C-: CPT

## 2024-03-08 PROCEDURE — 6370000000 HC RX 637 (ALT 250 FOR IP): Performed by: NURSE PRACTITIONER

## 2024-03-08 PROCEDURE — 85027 COMPLETE CBC AUTOMATED: CPT

## 2024-03-08 PROCEDURE — 97535 SELF CARE MNGMENT TRAINING: CPT

## 2024-03-08 PROCEDURE — 2060000000 HC ICU INTERMEDIATE R&B

## 2024-03-08 PROCEDURE — 99233 SBSQ HOSP IP/OBS HIGH 50: CPT | Performed by: INTERNAL MEDICINE

## 2024-03-08 PROCEDURE — 6370000000 HC RX 637 (ALT 250 FOR IP)

## 2024-03-08 RX ORDER — INSULIN LISPRO 100 [IU]/ML
10 INJECTION, SOLUTION INTRAVENOUS; SUBCUTANEOUS ONCE
Status: COMPLETED | OUTPATIENT
Start: 2024-03-08 | End: 2024-03-08

## 2024-03-08 RX ADMIN — INSULIN LISPRO 10 UNITS: 100 INJECTION, SOLUTION INTRAVENOUS; SUBCUTANEOUS at 17:48

## 2024-03-08 RX ADMIN — BUDESONIDE AND FORMOTEROL FUMARATE DIHYDRATE 2 PUFF: 160; 4.5 AEROSOL RESPIRATORY (INHALATION) at 20:30

## 2024-03-08 RX ADMIN — INSULIN LISPRO 4 UNITS: 100 INJECTION, SOLUTION INTRAVENOUS; SUBCUTANEOUS at 08:18

## 2024-03-08 RX ADMIN — METOCLOPRAMIDE HYDROCHLORIDE 10 MG: 5 INJECTION INTRAMUSCULAR; INTRAVENOUS at 02:24

## 2024-03-08 RX ADMIN — MIDODRINE HYDROCHLORIDE 15 MG: 10 TABLET ORAL at 08:19

## 2024-03-08 RX ADMIN — METOCLOPRAMIDE HYDROCHLORIDE 10 MG: 5 INJECTION INTRAMUSCULAR; INTRAVENOUS at 17:10

## 2024-03-08 RX ADMIN — INSULIN LISPRO 16 UNITS: 100 INJECTION, SOLUTION INTRAVENOUS; SUBCUTANEOUS at 17:10

## 2024-03-08 RX ADMIN — HYDROXYZINE HYDROCHLORIDE 25 MG: 25 TABLET, FILM COATED ORAL at 08:18

## 2024-03-08 RX ADMIN — METOCLOPRAMIDE HYDROCHLORIDE 10 MG: 5 INJECTION INTRAMUSCULAR; INTRAVENOUS at 11:10

## 2024-03-08 RX ADMIN — Medication 10 ML: at 08:26

## 2024-03-08 RX ADMIN — HYDROXYZINE HYDROCHLORIDE 25 MG: 25 TABLET, FILM COATED ORAL at 21:15

## 2024-03-08 RX ADMIN — BUDESONIDE AND FORMOTEROL FUMARATE DIHYDRATE 2 PUFF: 160; 4.5 AEROSOL RESPIRATORY (INHALATION) at 07:37

## 2024-03-08 RX ADMIN — PANTOPRAZOLE SODIUM 40 MG: 40 TABLET, DELAYED RELEASE ORAL at 06:59

## 2024-03-08 RX ADMIN — APIXABAN 2.5 MG: 2.5 TABLET, FILM COATED ORAL at 08:18

## 2024-03-08 RX ADMIN — SERTRALINE 50 MG: 50 TABLET, FILM COATED ORAL at 21:15

## 2024-03-08 RX ADMIN — APIXABAN 2.5 MG: 2.5 TABLET, FILM COATED ORAL at 21:15

## 2024-03-08 RX ADMIN — INSULIN LISPRO 8 UNITS: 100 INJECTION, SOLUTION INTRAVENOUS; SUBCUTANEOUS at 11:41

## 2024-03-08 RX ADMIN — MIDODRINE HYDROCHLORIDE 15 MG: 10 TABLET ORAL at 17:10

## 2024-03-08 RX ADMIN — ARIPIPRAZOLE 5 MG: 5 TABLET ORAL at 08:19

## 2024-03-08 RX ADMIN — Medication 10 ML: at 21:15

## 2024-03-08 RX ADMIN — TIOTROPIUM BROMIDE INHALATION SPRAY 2 PUFF: 3.12 SPRAY, METERED RESPIRATORY (INHALATION) at 07:38

## 2024-03-08 ASSESSMENT — PAIN SCALES - GENERAL
PAINLEVEL_OUTOF10: 0
PAINLEVEL_OUTOF10: 0

## 2024-03-08 NOTE — PROGRESS NOTES
Assessment completed, pm meds given, pt denies nausea @ this time, did have episode of emesis @ 8pm, medicated with Zofran prn, tolerated meds well, call light in reach, denies further needs    3/8/24 2345  Placed on bipap, call light in reach    3/8/24 0110  Report called to Bib[facility]UD[facility] 1w, pt transferring to room #191    3/8/24 0200  Transferred to 1wt via bed

## 2024-03-08 NOTE — PROGRESS NOTES
7/18/22 Dr. White exchanged to 15.5F x 28 cm.      15.5 fr by 23 cm Symetrex dialysis catheter inserted by Dr White    IR TUNNELED CATHETER PLACEMENT GREATER THAN 5 YEARS  07/07/2022    IR TUNNELED CATHETER PLACEMENT GREATER THAN 5 YEARS 7/7/2022 [facility] SPECIAL PROCEDURE    [place] ARTHRP KNE CONDYLE&PLATU MEDIAL&LAT COMPARTMENTS Left 06/21/2018    LEFT KNEE TOTAL KNEE ARTHROPLASTY, SHAYNA, NERVE BLOCK performed by Milad Sharma MD at [facility]    PTCA      TOE AMPUTATION Right     TOTAL KNEE ARTHROPLASTY  05/19/2016    Dr Sharma    TUNNELED VENOUS CATHETER PLACEMENT Right 07/01/2020    tunneled HD catheter per Dr White            Restrictions:fall risk       SUBJECTIVE:   Subjective: pt agreeable to getting out of bed    Pain  Pain: denies pre and post    OBJECTIVE:   Orientation  Overall Orientation Status: Within Functional Limits  Cognition  Overall Cognitive Status: WFL    Bed Mobility Training  Bed Mobility Training: Yes  Overall Level of Assistance: Moderate assistance;Assist X2  Interventions: Manual cues;Verbal cues;Safety awareness training  Rolling: Stand-by assistance  Supine to Sit: Moderate assistance;Assist X2  Sit to Supine: Stand-by assistance;Minimum assistance    Transfer Training  Transfer Training: Yes  Overall Level of Assistance: Minimum assistance;Assist X2  Interventions: Verbal cues  Sit to Stand: Minimum assistance;Assist X2  Stand to Sit: Minimum assistance;Assist X2                                              ASSESSMENT pt states she feels very off balance when standing and did not want to attempt to take steps. Declined to get up to chair and requested to get back to bed. Pt states she did not sleep well last night.         Discharge Recommendations:  Continue to assess pending progress, Patient would benefit from continued therapy after discharge, Therapy recommended at discharge         Goals  Long Term Goals  Long Term Goal 1: Pt to complete bed mobility with CGA  Long Term Goal

## 2024-03-08 NOTE — PROGRESS NOTES
Physician Progress Note      PATIENT:               VELASQUEZ GARBER  CSN #:                  [id number]  :                       1960  ADMIT DATE:       3/5/2024 3:53 PM  DISCH DATE:  RESPONDING  PROVIDER #:        SCOTT CORONEL          QUERY TEXT:    Pt admitted with hypotension requiring vasopressors. If possible, please   document in the progress notes and discharge summary if you are evaluating   and/or treating any of the following:  {{Distributive shock::This patietn is in Distributive shock.]]    The medical record reflects the following:  Risk Factors: ESRD on HD  Clinical Indicators: weakness, falls, BP 81/18-96/40, MAPS 25-60  Treatment: Nephrology & CC consults, IV Levophed, IVFB 500ml, labs and   monitoring    Thank you,  Analilia Saba   Clinical Documentation Improvement Specialist  W: [phone]  Options provided:  -- Obstructive shock  -- Hypovolemic Shock  -- Other - I will add my own diagnosis  -- Disagree - Not applicable / Not valid  -- Disagree - Clinically unable to determine / Unknown  -- Refer to Clinical Documentation Reviewer    PROVIDER RESPONSE TEXT:    This patient is in hypovolemic shock.    Query created by: Analilia Saba on 3/7/2024 4:28 PM      Electronically signed by:  SCOTT CORONEL 3/8/2024 8:25 AM

## 2024-03-08 NOTE — CARE COORDINATION
LSW spoke with [facility] admissions who said they are able to accept patient when discharged. No pre cert is needed. 7000 completed.

## 2024-03-08 NOTE — PLAN OF CARE
Problem: Pain  Goal: Verbalizes/displays adequate comfort level or baseline comfort level  3/8/2024 0338 by Sabrina Mclean RN  Outcome: Progressing    Problem: Skin/Tissue Integrity  Goal: Absence of new skin breakdown  Description: Monitor for areas of redness and/or skin breakdown  3/8/2024 0338 by Sabrina Mclean RN  Outcome: Progressing     Problem: Safety - Adult  Goal: Free from fall injury  3/8/2024 0338 by Sabrina Mclean RN  Outcome: Progressing       Problem: ABCDS Injury Assessment  Goal: Absence of physical injury  3/8/2024 0338 by Sabrina Mclean RN  Outcome: Progressing    Problem: Risk for Elopement  Goal: Patient will not exit the unit/facility without proper excort  3/8/2024 0338 by Sabrina Mclean RN  Outcome: Progressing    Problem: Discharge Planning  Goal: Discharge to home or other facility with appropriate resources  3/8/2024 0338 by Sabrina Mclean RN  Outcome: Progressing  Flowsheets (Taken 3/8/2024 0230)  Discharge to home or other facility with appropriate resources:   Identify barriers to discharge with patient and caregiver   Arrange for needed discharge resources and transportation as appropriate   Identify discharge learning needs (meds, wound care, etc)     Problem: Chronic Conditions and Co-morbidities  Goal: Patient's chronic conditions and co-morbidity symptoms are monitored and maintained or improved  3/8/2024 0338 by Sabrina Mclean RN  Outcome: Progressing  Flowsheets (Taken 3/8/2024 0230)  Care Plan - Patient's Chronic Conditions and Co-Morbidity Symptoms are Monitored and Maintained or Improved:   Monitor and assess patient's chronic conditions and comorbid symptoms for stability, deterioration, or improvement   Collaborate with multidisciplinary team to address chronic and comorbid conditions and prevent exacerbation or deterioration   Update acute care plan with appropriate goals if chronic or comorbid symptoms are exacerbated and prevent overall improvement and discharge      Problem: Respiratory - Adult  Goal: Achieves optimal ventilation and oxygenation  3/8/2024 0338 by Sabrina Mclean RN  Outcome: Progressing  Flowsheets (Taken 3/8/2024 0230)  Achieves optimal ventilation and oxygenation:   Assess for changes in respiratory status   Assess for changes in mentation and behavior   Position to facilitate oxygenation and minimize respiratory effort   Oxygen supplementation based on oxygen saturation or arterial blood gases   Assess and instruct to report shortness of breath or any respiratory difficulty   Respiratory therapy support as indicated     Problem: Cardiovascular - Adult  Goal: Maintains optimal cardiac output and hemodynamic stability  3/8/2024 0338 by Sabrina Mclean RN  Outcome: Progressing  Flowsheets (Taken 3/8/2024 0230)  Maintains optimal cardiac output and hemodynamic stability:   Monitor blood pressure and heart rate   Monitor urine output and notify Licensed Independent Practitioner for values outside of normal range   Assess for signs of decreased cardiac output  Goal: Absence of cardiac dysrhythmias or at baseline  3/8/2024 0338 by Sabrina Mclean RN  Outcome: Progressing  Flowsheets (Taken 3/8/2024 0230)  Absence of cardiac dysrhythmias or at baseline:   Monitor cardiac rate and rhythm   Assess for signs of decreased cardiac output     Problem: Skin/Tissue Integrity - Adult  Goal: Skin integrity remains intact  3/8/2024 0338 by Sabrina Mclean RN  Outcome: Progressing  Flowsheets (Taken 3/8/2024 0230)  Skin Integrity Remains Intact: Monitor for areas of redness and/or skin breakdown     Problem: Musculoskeletal - Adult  Goal: Return mobility to safest level of function  3/8/2024 0338 by Sabrina Mclean RN  Outcome: Progressing    Problem: Gastrointestinal - Adult  Goal: Minimal or absence of nausea and vomiting  3/8/2024 0338 by Sabrina Mclean RN  Outcome: Progressing  Goal: Maintains or returns to baseline bowel function  3/8/2024 0338 by Sabrina Mclean RN  Outcome:

## 2024-03-08 NOTE — PROGRESS NOTES
Nephrology Progress Note    Assessment:  ESRDX  Hypertension  DM type-2  Schizophrenia  COPD-[place]        Plan: dialysis tomorrow  going to NH  at her wish    Patient Active Problem List:     Atherosclerotic heart disease of native coronary artery with unspecified angina pectoris (HCC)     Schizophrenia, paranoid, chronic     Metabolic syndrome     Vitamin B 12 deficiency     Cerebral microvascular disease     Mixed hyperlipidemia     Other hammer toe (acquired)     Vitamin D insufficiency     Incontinence of urine     Diabetic nephropathy with proteinuria (HCC)     Essential (primary) hypertension     History of type C viral hepatitis     Urinary incontinence due to cognitive impairment     History of seizures     Stented coronary artery-plan is to stay on Plavix indefinately per Dr Walker     Inadequately controlled diabetes mellitus ([facility])     Controlled type 2 diabetes mellitus with diabetic neuropathy, with long-term current use of insulin ([facility])     Hemiparesis, left ([facility])     Angina, class II ([facility])     Pain, unspecified     Tardive dyskinesia     Shortness of breath     Uncontrolled type 2 diabetes mellitus with hyperglycemia ([facility])     Chronic diastolic congestive heart failure ([facility])     [place] (obstructive sleep apnea)     Pulmonary hypertension, unspecified ([facility])     Class 2 severe obesity with serious comorbidity and body mass index (BMI) of 36.0 to 36.9 in adult ([facility])     Edema     Closed supracondylar fracture of right humerus     Other chronic pain     Palliative care patient     Recurrent falls     Renal failure     Difficulty in walking     End-stage renal disease on hemodialysis ([facility])     Weakness     Other seizures ([facility])     Moderate persistent asthma without complication     COVID-19     Post PTCA     Falls frequently     Chest wall contusion, left, initial encounter     Headache, unspecified     Paranoid schizophrenia ([facility])     Compression fracture of spine ([facility])     Closed rib fracture     Depression      distress  HEENT: normocephalic, atraumatic, anicteric  Neck: supple, no mass  Lungs: non-labored respirations, clear to auscultation bilaterally  Heart: regular rate and rhythm, no murmurs or rubs  Abdomen: soft, non-tender, non-distended  Ext: no cyanosis, no peripheral edema fistula   Neuro: alert and oriented, no gross abnormalities  Psych: normal mood and affect  Skin: no rash      Electronically signed by Jaden Finch DO

## 2024-03-08 NOTE — PROGRESS NOTES
Patient arrived to 1 [place] room 191. Patient is alert and oriented x 4. Arrives on RA, and on continuous tele monitor.   She states that she does not wear oxygen at home. Her home cpap is at her bedside, but she says she does not want to wear it tonight.     Has bruising on right side of face. Has kerlix wrap present to left forearm, and per report from ICU nurses, it is covering scabs, and the kerlix is to help keep patient from picking at them.  Patient states prior to being in the hospital she was living at home with her daughter and became ill while at outpatient dialysis.     Patient was oriented to our floor, to her new room, and to use of call button at this time.       Sabrina CORDOBAN, RN, PCCN-[facility]  3/8/2024 2:12 AM

## 2024-03-08 NOTE — PROGRESS NOTES
INPATIENT PROGRESS NOTES    PATIENT NAME: Michelle Le  MRN: [id number]  SERVICE DATE:  March 8, 2024   SERVICE TIME:  10:39 AM      PRIMARY SERVICE: Pulmonary Disease    CHIEF COMPLAINTS: Chronic hypoxia, [place]    INTERVAL HPI: Patient seen and examined at bedside, Interval Notes, orders reviewed. Nursing notes noted, She is on 2 L O2 saturation 98%, no fever    Patient report doing good, has no complaint, no chest pain, no coughing,    New information updated in the note today, rest of the examination did not change compared to yesterday.    Review of system:     GI Abdominal pain No  Skin Rash No    Social History     Tobacco Use    Smoking status: Never     Passive exposure: Never    Smokeless tobacco: Never   Substance Use Topics    Alcohol use: No     Alcohol/week: 0.0 standard drinks of alcohol         Problem Relation Age of Onset    Cancer Mother 68        survived    Breast Cancer Mother     Hypertension Father     Diabetes Sister     Mental Illness Sister     Colon Cancer Neg Hx          OBJECTIVE    Body mass index is 34.92 kg/m².    PHYSICAL EXAM:  Vitals:  BP (!) 137/49   Pulse (!) 46   Temp 98.2 °F (36.8 °C) (Axillary)   Resp 18   Ht 1.702 m (5' 7.01\")   Wt 101.2 kg (223 lb)   LMP  (LMP Unknown)   SpO2 98%   BMI 34.92 kg/m²     General: alert, cooperative, no distress  Head: normocephalic, atraumatic  Eyes:No gross abnormalities.  ENT:  MMM no lesions  Neck:  supple and no masses  Chest : clear to auscultation bilaterally- no wheezes, rales or rhonchi, normal air movement, no respiratory distress  Heart:: Heart sounds are normal.  Regular rate and rhythm without murmur, gallop or rub.  ABD:  symmetric, soft, non-tender  Musculoskeletal : no cyanosis, no clubbing, and no edema  Neuro:  Grossly normal  Skin: No rashes or nodules noted.  Lymph node:  no cervical nodes  Urology: No Reynolds   Psychiatric: appropriate    DATA:   Recent Labs     03/07/24  0429 03/08/24  0530   WBC 9.2 11.1*   HGB

## 2024-03-08 NOTE — PROGRESS NOTES
[facility]  Occupational Therapy        NAME:  Michelle Le  ROOM: W191/W191-01  :  1960  DATE: 3/8/2024    Attempted to see Michelle Le at 1147  on this date for:   []  Initial Evaluation   [x]  Treatment       Patient was unable to be seen due to:   [] Off unit for testing/procedure    [] Patient refused, stating \"    [] Therapy on hold due to     [] Nursing deferred due to    [x] Other:  Pt eating lunch at this time.     Electronically signed by MARY Hill on 3/8/24 at 11:49 AM EST

## 2024-03-08 NOTE — DISCHARGE INSTR - COC
Continuity of Care Form    Patient Name: Michelle Le   :  1960  MRN:  [id number]    Admit date:  3/5/2024  Discharge date:  3/9/2024    Code Status Order: Full Code   Advance Directives:     Admitting Physician:  Chan Rai MD  PCP: Adilene Terry MD    Discharging Nurse: Emilie Skinner RN  Discharging Hospital Unit/Room#: W191/W191-01  Discharging Unit Phone Number: [phone]    Emergency Contact:   Extended Emergency Contact Information  Primary Emergency Contact: Angelina Fagan   [facility]  Home Phone: [phone]  Work Phone: [phone]  Mobile Phone: [phone]  Relation: Child  Secondary Emergency Contact: Brennen Danielson  Mobile Phone: [phone]  Relation: Other    Past Surgical History:  Past Surgical History:   Procedure Laterality Date    AV FISTULA CREATION Left 2023    [facility]     SECTION      x1    COLONOSCOPY  2014    Dr. Bello    CORONARY ANGIOPLASTY WITH STENT PLACEMENT      x1 Dr. Walker, Dr Borja 2018    CORONARY ANGIOPLASTY WITH STENT PLACEMENT  08/10/2021    [facility]    DIAGNOSTIC CARDIAC CATH LAB PROCEDURE  10/02/2019    DIALYSIS CATHETER INSERTION Left 2022    Tunneled Symetrex 15.5F x 23cm hemodialysis catheter inserted by Dr. White    DIALYSIS CATHETER INSERTION Left 2022    [id number] replamcent tunneld HD cath by Dr. White    HYSTERECTOMY, TOTAL ABDOMINAL (CERVIX REMOVED)      one ovary intact, Dr Fabian, menorrhagia    IR THROMBECTOMY PERCUT AVF  2022    IR THROMBECTOMY PERCUT AVF 2022 [facility] SPECIAL PROCEDURE    IR TUNNELED CATHETER PLACEMENT GREATER THAN 5 YEARS  2022    IR TUNNELED CATHETER PLACEMENT GREATER THAN 5 YEARS 6/3/2022 [facility] SPECIAL PROCEDURE    IR TUNNELED CATHETER PLACEMENT GREATER THAN 5 YEARS Left 2022 Dr. White exchanged to 15.5F x 28 cm.      15.5 fr by 23 cm Symetrex dialysis catheter inserted by Dr White    IR TUNNELED CATHETER PLACEMENT GREATER THAN 5

## 2024-03-09 VITALS
WEIGHT: 218.7 LBS | OXYGEN SATURATION: 96 % | SYSTOLIC BLOOD PRESSURE: 136 MMHG | HEART RATE: 82 BPM | BODY MASS INDEX: 34.33 KG/M2 | DIASTOLIC BLOOD PRESSURE: 78 MMHG | HEIGHT: 67 IN | TEMPERATURE: 97.4 F | RESPIRATION RATE: 16 BRPM

## 2024-03-09 LAB
ANION GAP SERPL CALCULATED.3IONS-SCNC: 16 MEQ/L (ref 9–15)
BUN SERPL-MCNC: 28 MG/DL (ref 8–23)
CALCIUM SERPL-MCNC: 9.3 MG/DL (ref 8.5–9.9)
CHLORIDE SERPL-SCNC: 90 MEQ/L (ref 95–107)
CO2 SERPL-SCNC: 25 MEQ/L (ref 20–31)
CREAT SERPL-MCNC: 5.81 MG/DL (ref 0.5–0.9)
ERYTHROCYTE [DISTWIDTH] IN BLOOD BY AUTOMATED COUNT: 15.8 % (ref 11.5–14.5)
GLUCOSE BLD-MCNC: 160 MG/DL (ref 70–99)
GLUCOSE SERPL-MCNC: 190 MG/DL (ref 70–99)
HCT VFR BLD AUTO: 36.2 % (ref 37–47)
HGB BLD-MCNC: 12.2 G/DL (ref 12–16)
MCH RBC QN AUTO: 33.3 PG (ref 27–31.3)
MCHC RBC AUTO-ENTMCNC: 33.7 % (ref 33–37)
MCV RBC AUTO: 98.9 FL (ref 79.4–94.8)
PERFORMED ON: ABNORMAL
PLATELET # BLD AUTO: 154 K/UL (ref 130–400)
POTASSIUM SERPL-SCNC: 4.3 MEQ/L (ref 3.4–4.9)
RBC # BLD AUTO: 3.66 M/UL (ref 4.2–5.4)
SODIUM SERPL-SCNC: 131 MEQ/L (ref 135–144)
WBC # BLD AUTO: 11.2 K/UL (ref 4.8–10.8)

## 2024-03-09 PROCEDURE — 2580000003 HC RX 258

## 2024-03-09 PROCEDURE — 36415 COLL VENOUS BLD VENIPUNCTURE: CPT

## 2024-03-09 PROCEDURE — 99232 SBSQ HOSP IP/OBS MODERATE 35: CPT | Performed by: INTERNAL MEDICINE

## 2024-03-09 PROCEDURE — 2700000000 HC OXYGEN THERAPY PER DAY

## 2024-03-09 PROCEDURE — 6370000000 HC RX 637 (ALT 250 FOR IP)

## 2024-03-09 PROCEDURE — 90935 HEMODIALYSIS ONE EVALUATION: CPT

## 2024-03-09 PROCEDURE — 6370000000 HC RX 637 (ALT 250 FOR IP): Performed by: NURSE PRACTITIONER

## 2024-03-09 PROCEDURE — 85027 COMPLETE CBC AUTOMATED: CPT

## 2024-03-09 PROCEDURE — 80048 BASIC METABOLIC PNL TOTAL CA: CPT

## 2024-03-09 PROCEDURE — 94640 AIRWAY INHALATION TREATMENT: CPT

## 2024-03-09 PROCEDURE — 6360000002 HC RX W HCPCS: Performed by: INTERNAL MEDICINE

## 2024-03-09 PROCEDURE — 94760 N-INVAS EAR/PLS OXIMETRY 1: CPT

## 2024-03-09 RX ORDER — METOPROLOL SUCCINATE 25 MG/1
25 TABLET, EXTENDED RELEASE ORAL DAILY
Qty: 30 TABLET | Refills: 3 | DISCHARGE
Start: 2024-03-10

## 2024-03-09 RX ORDER — MIDODRINE HYDROCHLORIDE 5 MG/1
15 TABLET ORAL
DISCHARGE
Start: 2024-03-09

## 2024-03-09 RX ADMIN — PANTOPRAZOLE SODIUM 40 MG: 40 TABLET, DELAYED RELEASE ORAL at 06:00

## 2024-03-09 RX ADMIN — METOCLOPRAMIDE HYDROCHLORIDE 10 MG: 5 INJECTION INTRAMUSCULAR; INTRAVENOUS at 11:13

## 2024-03-09 RX ADMIN — APIXABAN 2.5 MG: 2.5 TABLET, FILM COATED ORAL at 08:13

## 2024-03-09 RX ADMIN — METOCLOPRAMIDE HYDROCHLORIDE 10 MG: 5 INJECTION INTRAMUSCULAR; INTRAVENOUS at 01:15

## 2024-03-09 RX ADMIN — TIOTROPIUM BROMIDE INHALATION SPRAY 2 PUFF: 3.12 SPRAY, METERED RESPIRATORY (INHALATION) at 07:41

## 2024-03-09 RX ADMIN — MIDODRINE HYDROCHLORIDE 15 MG: 10 TABLET ORAL at 08:13

## 2024-03-09 RX ADMIN — HYDROXYZINE HYDROCHLORIDE 25 MG: 25 TABLET, FILM COATED ORAL at 08:13

## 2024-03-09 RX ADMIN — Medication 10 ML: at 11:13

## 2024-03-09 RX ADMIN — ARIPIPRAZOLE 5 MG: 5 TABLET ORAL at 14:37

## 2024-03-09 RX ADMIN — BUDESONIDE AND FORMOTEROL FUMARATE DIHYDRATE 2 PUFF: 160; 4.5 AEROSOL RESPIRATORY (INHALATION) at 07:41

## 2024-03-09 ASSESSMENT — PAIN SCALES - GENERAL: PAINLEVEL_OUTOF10: 0

## 2024-03-09 NOTE — DISCHARGE SUMMARY
no relief after 1 dose, call 911.  Qty: 25 tablet, Refills: 3      hydrOXYzine HCl (ATARAX) 25 MG tablet Take 1 tablet by mouth 2 times daily      SURE COMFORT PEN NEEDLES 30G X 8 MM MISC USE AS DIRECTED FIVE TIMES A DAILY  Qty: 100 each, Refills: 10      LANTUS SOLOSTAR 100 UNIT/ML injection pen INJECT 55 UNITS SUBCUTANEOUSLY AT BEDTIME  Qty: 15 mL, Refills: 10      ARIPiprazole (ABILIFY) 5 MG tablet TAKE 1 TABLET BY MOUTH ONCE DAILY  Qty: 30 tablet, Refills: 10    Associated Diagnoses: Moderate episode of recurrent major depressive disorder (HCC)      sertraline (ZOLOFT) 50 MG tablet Take 1 tablet by mouth nightly  Qty: 30 tablet, Refills: 5      atorvastatin (LIPITOR) 40 MG tablet Take 1 tablet by mouth nightly  Qty: 30 tablet, Refills: 5      Handicap Placard MISC by Does not apply route Expiration in 5 years.  Qty: 1 each, Refills: 0    Associated Diagnoses: Chronic diastolic congestive heart failure (HCC); Moderate persistent asthma without complication; Difficulty in walking      !! blood glucose test strips (ONETOUCH VERIO) strip QID  Qty: 300 each, Refills: 3      !! OneTouch Delica Lancets 33G MISC QID  Qty: 300 each, Refills: 3      aspirin EC 81 MG EC tablet Take 1 tablet by mouth daily  Qty: 30 tablet, Refills: 12      !! blood glucose test strips (FREESTYLE LITE) strip 1 each by Does not apply route 4 times daily (before meals and nightly) As needed.  Qty: 200 strip, Refills: 5    Comments: **Patient requests 90 days supply**      acetaminophen (TYLENOL) 325 MG tablet Take 2 tablets by mouth every 4 hours as needed for Pain (For mild to moderate pain (Pain 1-6 out of 10 on pain scale))  Qty: 120 tablet, Refills: 0      Blood Glucose Monitoring Suppl (FREESTYLE LITE) CORETTA 1 Device by Does not apply route daily as needed (Diabetes) Use freestyle meter to test blood sugar as needed  Qty: 1 Device, Refills: 0    Associated Diagnoses: Uncontrolled type 2 diabetes mellitus with hyperglycemia (HCC)       !! -  Potential duplicate medications found. Please discuss with provider.        STOP taking these medications       isosorbide mononitrate (IMDUR) 30 MG extended release tablet Comments:   Reason for Stopping:         fluconazole (DIFLUCAN) 150 MG tablet Comments:   Reason for Stopping:         metoprolol tartrate (LOPRESSOR) 25 MG tablet Comments:   Reason for Stopping:             Activity: activity as tolerated  Diet: regular diet  Wound Care: none needed    Follow-up with PCP 1-2 weeks.    DC time 35 minutes    Signed:  Electronically signed by KANIKA DUKE MD on 3/9/2024 at 11:10 AM

## 2024-03-09 NOTE — FLOWSHEET NOTE
Iv and tele removed for discharge. Care plan completed for discharge. Appropriate education addressed in discharge instructions. Instructions completed and reviewed with patient. Verbalize understanding of instructions and follow up. Personal belongings gathered. No complaints. [facility] ambulance present for transport. Report called to [place]. Questions answered and addressed.

## 2024-03-09 NOTE — PLAN OF CARE
Problem: Pain  Goal: Verbalizes/displays adequate comfort level or baseline comfort level  Outcome: Adequate for Discharge     Problem: Skin/Tissue Integrity  Goal: Absence of new skin breakdown  Description: 1.  Monitor for areas of redness and/or skin breakdown  2.  Assess vascular access sites hourly  3.  Every 4-6 hours minimum:  Change oxygen saturation probe site  4.  Every 4-6 hours:  If on nasal continuous positive airway pressure, respiratory therapy assess nares and determine need for appliance change or resting period.  Outcome: Adequate for Discharge     Problem: Safety - Adult  Goal: Free from fall injury  Outcome: Adequate for Discharge     Problem: ABCDS Injury Assessment  Goal: Absence of physical injury  Outcome: Adequate for Discharge     Problem: Risk for Elopement  Goal: Patient will not exit the unit/facility without proper excort  Outcome: Adequate for Discharge     Problem: Discharge Planning  Goal: Discharge to home or other facility with appropriate resources  Outcome: Adequate for Discharge     Problem: Chronic Conditions and Co-morbidities  Goal: Patient's chronic conditions and co-morbidity symptoms are monitored and maintained or improved  Outcome: Adequate for Discharge     Problem: Respiratory - Adult  Goal: Achieves optimal ventilation and oxygenation  Outcome: Adequate for Discharge     Problem: Cardiovascular - Adult  Goal: Maintains optimal cardiac output and hemodynamic stability  Outcome: Adequate for Discharge  Goal: Absence of cardiac dysrhythmias or at baseline  Outcome: Adequate for Discharge     Problem: Skin/Tissue Integrity - Adult  Goal: Skin integrity remains intact  Outcome: Adequate for Discharge  Flowsheets (Taken 3/9/2024 1219)  Skin Integrity Remains Intact: Monitor for areas of redness and/or skin breakdown     Problem: Musculoskeletal - Adult  Goal: Return mobility to safest level of function  Outcome: Adequate for Discharge     Problem: Gastrointestinal -  Adult  Goal: Minimal or absence of nausea and vomiting  Outcome: Adequate for Discharge  Goal: Maintains or returns to baseline bowel function  Outcome: Adequate for Discharge  Goal: Maintains adequate nutritional intake  Outcome: Adequate for Discharge     Problem: Infection - Adult  Goal: Absence of infection at discharge  Outcome: Adequate for Discharge     Problem: Metabolic/Fluid and Electrolytes - Adult  Goal: Electrolytes maintained within normal limits  Outcome: Adequate for Discharge  Goal: Hemodynamic stability and optimal renal function maintained  Outcome: Adequate for Discharge  Goal: Glucose maintained within prescribed range  Outcome: Adequate for Discharge     Problem: Hematologic - Adult  Goal: Maintains hematologic stability  Outcome: Adequate for Discharge

## 2024-03-09 NOTE — CARE COORDINATION
CALL PLACED TO [facility], PT IS ACCEPTED AND CAN TRANSFER TO [place] WHEN MEDICALLY CLEARED.

## 2024-03-09 NOTE — PROGRESS NOTES
INPATIENT PROGRESS NOTES    PATIENT NAME: Michelle Le  MRN: [id number]  SERVICE DATE:  March 9, 2024   SERVICE TIME:  11:31 AM      PRIMARY SERVICE: Pulmonary Disease    CHIEF COMPLAINTS: Chronic hypoxia, [place]    INTERVAL HPI: Patient seen and examined at bedside, Interval Notes, orders reviewed. Nursing notes noted, She is on 2 L O2 saturation 98%, no fever    No complaint, no chest pain, no coughing, no worsening lower extremity edema.  She is on room air saturation 96%.    New information updated in the note today, rest of the examination did not change compared to yesterday.    Review of system:     GI Abdominal pain No  Skin Rash No    Social History     Tobacco Use    Smoking status: Never     Passive exposure: Never    Smokeless tobacco: Never   Substance Use Topics    Alcohol use: No     Alcohol/week: 0.0 standard drinks of alcohol         Problem Relation Age of Onset    Cancer Mother 68        survived    Breast Cancer Mother     Hypertension Father     Diabetes Sister     Mental Illness Sister     Colon Cancer Neg Hx          OBJECTIVE    Body mass index is 34.92 kg/m².    PHYSICAL EXAM:  Vitals:  BP (!) 127/57   Pulse 65   Temp 97.5 °F (36.4 °C) (Oral)   Resp 16   Ht 1.702 m (5' 7.01\")   Wt 101.2 kg (223 lb)   LMP  (LMP Unknown)   SpO2 96%   BMI 34.92 kg/m²     General: alert, cooperative, no distress  Head: normocephalic, atraumatic  Eyes:No gross abnormalities.  ENT:  MMM no lesions  Neck:  supple and no masses  Chest : clear to auscultation bilaterally- no wheezes, rales or rhonchi, normal air movement, no respiratory distress  Heart:: Heart sounds are normal.  Regular rate and rhythm without murmur, gallop or rub.  ABD:  symmetric, soft, non-tender  Musculoskeletal : no cyanosis, no clubbing, and no edema  Neuro:  Grossly normal  Skin: No rashes or nodules noted.  Lymph node:  no cervical nodes  Urology: No Reynolds   Psychiatric: appropriate    DATA:   Recent Labs     03/08/24  0530

## 2024-03-09 NOTE — FLOWSHEET NOTE
03/09/24 1235   Vital Signs   /78   Temp 97.4 °F (36.3 °C)   Pulse 82   Respirations 16   Weight - Scale 99.2 kg (218 lb 11.1 oz)   Percent Weight Change -1.93   Pain Assessment   Pain Assessment None - Denies Pain   Post-Hemodialysis Assessment   Post-Treatment Procedures Blood returned;Access bleeding time > 10 minutes   Machine Disinfection Process Exterior Machine Disinfection   Dialyzer Clearance Moderately streaked   Duration of Treatment (minutes) 210 minutes   Hemodialysis Intake (ml) 400 ml   Hemodialysis Output (ml) 2400 ml   NET Removed (ml) 2000   Patient Response to Treatment Pt tolerated tx well   Bilateral Breath Sounds Diminished   Edema None   Patient Disposition Return to room   Observations & Evaluations   Level of Consciousness 0   Heart Rhythm Regular   Respiratory Quality/Effort Unlabored   O2 Device None (Room air)   Comments Pt stable post dialysis. Fluid balance -2000ml. Needles pulled. Hemostasis achieved. Dressing applied. Pt back to floor per transport

## 2024-03-09 NOTE — PROGRESS NOTES
Renal Progress Note    Assessment and Plan:    1.  Clinically stable   2.  Lab reviewed   3.  No need to change current line of management of the patient holding diagnosis from the kidneys standpoint  Will follow patient renal replacement    Patient Active Problem List:     Atherosclerotic heart disease of native coronary artery with unspecified angina pectoris (HCC)     Schizophrenia, paranoid, chronic     Metabolic syndrome     Vitamin B 12 deficiency     Cerebral microvascular disease     Mixed hyperlipidemia     Other hammer toe (acquired)     Vitamin D insufficiency     Incontinence of urine     Diabetic nephropathy with proteinuria (HCC)     Essential (primary) hypertension     History of type C viral hepatitis     Urinary incontinence due to cognitive impairment     History of seizures     Stented coronary artery-plan is to stay on Plavix indefinately per Dr [place]     Inadequately controlled diabetes mellitus (HCC)     Controlled type 2 diabetes mellitus with diabetic neuropathy, with long-term current use of insulin ([facility])     Hemiparesis, left ([facility])     Angina, class II ([facility])     Pain, unspecified     Tardive dyskinesia     Shortness of breath     Uncontrolled type 2 diabetes mellitus with hyperglycemia ([facility])     Chronic diastolic congestive heart failure ([facility])     [place] (obstructive sleep apnea)     Pulmonary hypertension, unspecified ([facility])     Class 2 severe obesity with serious comorbidity and body mass index (BMI) of 36.0 to 36.9 in adult (HCC)     Edema     Closed supracondylar fracture of right humerus     Other chronic pain     Palliative care patient     Recurrent falls     Renal failure     Difficulty in walking     End-stage renal disease on hemodialysis (HCC)     Weakness     Other seizures (HCC)     Moderate persistent asthma without complication     COVID-19     Post PTCA     Falls frequently     Chest wall contusion, left, initial encounter     Headache, unspecified     Paranoid schizophrenia  metoclopramide  10 mg IntraVENous q8h    midodrine  15 mg Oral TID WC    pantoprazole  40 mg Oral QAM AC    budesonide-formoterol  2 puff Inhalation BID RT    tiotropium  2 puff Inhalation Daily RT    ARIPiprazole  5 mg Oral Daily    hydrOXYzine HCl  25 mg Oral BID    sertraline  50 mg Oral Nightly    metoprolol succinate  25 mg Oral Daily    insulin lispro  0-16 Units SubCUTAneous TID WC    insulin lispro  0-4 Units SubCUTAneous Nightly    apixaban  2.5 mg Oral BID    sodium chloride flush  5-40 mL IntraVENous 2 times per day     Continuous Infusions:   dextrose      sodium chloride         CBC:   Recent Labs     03/08/24 0530 03/09/24  0543   WBC 11.1* 11.2*   HGB 12.5 12.2    154     CMP:    Recent Labs     03/07/24 0429 03/08/24 0530 03/09/24  0543   * 133* 131*   K 4.5  4.5 4.6 4.3   CL 91* 89* 90*   CO2 27 25 25   BUN 24* 20 28*   CREATININE 5.81* 4.34* 5.81*   GLUCOSE 202* 237* 190*   CALCIUM 9.1 9.3 9.3   LABGLOM 7.5* 10.7* 7.5*     Troponin: No results for input(s): \"TROPONINI\" in the last 72 hours.  BNP: No results for input(s): \"BNP\" in the last 72 hours.  INR: No results for input(s): \"INR\" in the last 72 hours.  Lipids: No results for input(s): \"CHOL\", \"LDLDIRECT\", \"TRIG\", \"HDL\", \"AMYLASE\", \"LIPASE\" in the last 72 hours.  Liver:   Recent Labs     03/07/24 0429   AST 17   ALT 14   ALKPHOS 85   PROT 6.8   LABALBU 4.0   BILITOT 1.1*     Iron:  No results for input(s): \"IRONS\", \"FERRITIN\" in the last 72 hours.    Invalid input(s): \"LABIRONS\"  Urinalysis: No results for input(s): \"UA\" in the last 72 hours.    Objective:   Vitals: /78   Pulse 82   Temp 97.4 °F (36.3 °C)   Resp 16   Ht 1.702 m (5' 7.01\")   Wt 99.2 kg (218 lb 11.1 oz)   LMP  (LMP Unknown)   SpO2 96%   BMI 34.24 kg/m²    Wt Readings from Last 3 Encounters:   03/09/24 99.2 kg (218 lb 11.1 oz)   02/26/24 98 kg (216 lb)   02/07/24 98.4 kg (217 lb)      24HR INTAKE/OUTPUT:    Intake/Output Summary (Last 24 hours) at

## 2024-03-10 ENCOUNTER — OFFICE VISIT (OUTPATIENT)
Dept: GERIATRIC MEDICINE | Age: 64
End: 2024-03-10
Payer: MEDICARE

## 2024-03-10 DIAGNOSIS — I48.91 ATRIAL FIBRILLATION, UNSPECIFIED TYPE (HCC): ICD-10-CM

## 2024-03-10 DIAGNOSIS — E78.5 HYPERLIPIDEMIA, UNSPECIFIED HYPERLIPIDEMIA TYPE: ICD-10-CM

## 2024-03-10 DIAGNOSIS — Z99.2 ESRD (END STAGE RENAL DISEASE) ON DIALYSIS (HCC): ICD-10-CM

## 2024-03-10 DIAGNOSIS — N18.6 ESRD (END STAGE RENAL DISEASE) ON DIALYSIS (HCC): ICD-10-CM

## 2024-03-10 DIAGNOSIS — Z79.4 TYPE 2 DIABETES MELLITUS WITH OTHER CIRCULATORY COMPLICATION, WITH LONG-TERM CURRENT USE OF INSULIN (HCC): ICD-10-CM

## 2024-03-10 DIAGNOSIS — F32.A DEPRESSION, UNSPECIFIED DEPRESSION TYPE: ICD-10-CM

## 2024-03-10 DIAGNOSIS — E11.59 TYPE 2 DIABETES MELLITUS WITH OTHER CIRCULATORY COMPLICATION, WITH LONG-TERM CURRENT USE OF INSULIN (HCC): ICD-10-CM

## 2024-03-10 DIAGNOSIS — R53.1 WEAKNESS: Primary | ICD-10-CM

## 2024-03-10 PROCEDURE — 99306 1ST NF CARE HIGH MDM 50: CPT | Performed by: INTERNAL MEDICINE

## 2024-03-10 PROCEDURE — 1123F ACP DISCUSS/DSCN MKR DOCD: CPT | Performed by: INTERNAL MEDICINE

## 2024-03-10 NOTE — PROGRESS NOTES
Physical Therapy  Facility/Department: [facility] MED SURG W191/W191-01  Physical Therapy Discharge      NAME: Michelle Le    : 1960 (64 y.o.)  MRN: [id number]    Account: [id number]  Gender: female      Patient has been discharged from [facility] hospital. DC patient from current PT program.      Electronically signed by Jennifer Gonsalez PT on 3/10/24 at 11:59 AM EDT

## 2024-03-11 ENCOUNTER — OFFICE VISIT (OUTPATIENT)
Dept: GERIATRIC MEDICINE | Age: 64
End: 2024-03-11

## 2024-03-11 DIAGNOSIS — R53.1 WEAKNESS: Primary | ICD-10-CM

## 2024-03-11 DIAGNOSIS — E78.5 HYPERLIPIDEMIA, UNSPECIFIED HYPERLIPIDEMIA TYPE: ICD-10-CM

## 2024-03-11 DIAGNOSIS — F32.A DEPRESSION, UNSPECIFIED DEPRESSION TYPE: ICD-10-CM

## 2024-03-11 DIAGNOSIS — E11.59 TYPE 2 DIABETES MELLITUS WITH OTHER CIRCULATORY COMPLICATION, WITH LONG-TERM CURRENT USE OF INSULIN (HCC): ICD-10-CM

## 2024-03-11 DIAGNOSIS — Z99.2 ESRD (END STAGE RENAL DISEASE) ON DIALYSIS (HCC): ICD-10-CM

## 2024-03-11 DIAGNOSIS — I48.91 ATRIAL FIBRILLATION, UNSPECIFIED TYPE (HCC): ICD-10-CM

## 2024-03-11 DIAGNOSIS — Z79.4 TYPE 2 DIABETES MELLITUS WITH OTHER CIRCULATORY COMPLICATION, WITH LONG-TERM CURRENT USE OF INSULIN (HCC): ICD-10-CM

## 2024-03-11 DIAGNOSIS — N18.6 ESRD (END STAGE RENAL DISEASE) ON DIALYSIS (HCC): ICD-10-CM

## 2024-03-11 NOTE — PROGRESS NOTES
History and Physical      CHIEF COMPLAINT:  Fall     History of Present Illness:      A 64 y.o. female who is being seen at [facility] s/p admit for a fall. Initially she was hypotensive and required pressors in the ICU.  She continues to get hemodialysis. Her BP medications were adjusted.     REVIEW OF SYSTEMS:  A complete 10 Point review of systems was preformed and negative unless previously stated      PMH:  Past Medical History:   Diagnosis Date    Angina at rest 5/24/2020    Anxiety     CAD S/P percutaneous coronary angioplasty 2015, 2018    stents per Huong Sanabria    CHF (congestive heart failure) ([facility])     CKD (chronic kidney disease) stage 4, GFR 15-29 ml/min ([facility]) 2/24/2018    CKD stage 4 due to type 2 diabetes mellitus ([facility])     Contusion of right chest wall 2/16/2021    COPD (chronic obstructive pulmonary disease) ([facility])     Diabetic nephropathy with proteinuria ([facility]) 2014    DJD (degenerative joint disease) of knee     Dr Sharma    GERD (gastroesophageal reflux disease)     Hemiparesis, left ([facility]) 2013    entered Assisted Living ([place])    Hemodialysis patient ([facility])     Hemodialysis-associated hypotension 10/22/2021    History of heart failure     History of seizures     History of type C viral hepatitis     HTN (hypertension)     Hyperlipidemia     Impaired mobility and activities of daily living     Infection of venous access port 7/29/2022    Mediastinal lymphadenopathy 2013    Sangeeta Cummings    Metabolic syndrome     Moderate persistent asthma without complication 9/23/2020    Need for extended care facility 7/7/2021    Neurogenic urinary incontinence 2013    Neuropathy in diabetes ([facility])     Nonrheumatic mitral valve regurgitation 7/7/2021    Nonrheumatic tricuspid valve regurgitation 7/7/2021    Obesity (BMI 30-39.9)     Recurrent UTI     S/P colonoscopy 2014    [facility], focal active colitis    Schizophrenia, paranoid, chronic ([facility])     [facility]    Small vessel disease,

## 2024-03-12 ENCOUNTER — OFFICE VISIT (OUTPATIENT)
Dept: GERIATRIC MEDICINE | Age: 64
End: 2024-03-12

## 2024-03-12 DIAGNOSIS — N18.6 ESRD (END STAGE RENAL DISEASE) ON DIALYSIS (HCC): ICD-10-CM

## 2024-03-12 DIAGNOSIS — E78.5 HYPERLIPIDEMIA, UNSPECIFIED HYPERLIPIDEMIA TYPE: ICD-10-CM

## 2024-03-12 DIAGNOSIS — R53.1 WEAKNESS: Primary | ICD-10-CM

## 2024-03-12 DIAGNOSIS — E11.59 TYPE 2 DIABETES MELLITUS WITH OTHER CIRCULATORY COMPLICATION, WITH LONG-TERM CURRENT USE OF INSULIN (HCC): ICD-10-CM

## 2024-03-12 DIAGNOSIS — M25.562 CHRONIC PAIN OF LEFT KNEE: ICD-10-CM

## 2024-03-12 DIAGNOSIS — Z96.652 S/P REVISION OF TOTAL KNEE, LEFT: ICD-10-CM

## 2024-03-12 DIAGNOSIS — Z79.4 TYPE 2 DIABETES MELLITUS WITH OTHER CIRCULATORY COMPLICATION, WITH LONG-TERM CURRENT USE OF INSULIN (HCC): ICD-10-CM

## 2024-03-12 DIAGNOSIS — I48.91 ATRIAL FIBRILLATION, UNSPECIFIED TYPE (HCC): ICD-10-CM

## 2024-03-12 DIAGNOSIS — N18.6 CKD (CHRONIC KIDNEY DISEASE) REQUIRING CHRONIC DIALYSIS (HCC): ICD-10-CM

## 2024-03-12 DIAGNOSIS — G89.29 CHRONIC PAIN OF LEFT KNEE: ICD-10-CM

## 2024-03-12 DIAGNOSIS — F32.A DEPRESSION, UNSPECIFIED DEPRESSION TYPE: ICD-10-CM

## 2024-03-12 DIAGNOSIS — I27.20 PULMONARY HYPERTENSION, UNSPECIFIED (HCC): ICD-10-CM

## 2024-03-12 DIAGNOSIS — I50.32 CHRONIC DIASTOLIC CONGESTIVE HEART FAILURE (HCC): ICD-10-CM

## 2024-03-12 DIAGNOSIS — Z99.2 CKD (CHRONIC KIDNEY DISEASE) REQUIRING CHRONIC DIALYSIS (HCC): ICD-10-CM

## 2024-03-12 DIAGNOSIS — Z99.2 ESRD (END STAGE RENAL DISEASE) ON DIALYSIS (HCC): ICD-10-CM

## 2024-03-12 LAB
ANION GAP SERPL CALCULATED.3IONS-SCNC: 16 MEQ/L (ref 9–15)
BUN SERPL-MCNC: 30 MG/DL (ref 8–23)
CALCIUM SERPL-MCNC: 9.3 MG/DL (ref 8.5–9.9)
CHLORIDE SERPL-SCNC: 95 MEQ/L (ref 95–107)
CO2 SERPL-SCNC: 27 MEQ/L (ref 20–31)
CREAT SERPL-MCNC: 7.07 MG/DL (ref 0.5–0.9)
ERYTHROCYTE [DISTWIDTH] IN BLOOD BY AUTOMATED COUNT: 16.6 % (ref 11.5–14.5)
GLUCOSE SERPL-MCNC: 36 MG/DL (ref 70–99)
HCT VFR BLD AUTO: 36.2 % (ref 37–47)
HGB BLD-MCNC: 12 G/DL (ref 12–16)
MCH RBC QN AUTO: 33.2 PG (ref 27–31.3)
MCHC RBC AUTO-ENTMCNC: 33.1 % (ref 33–37)
MCV RBC AUTO: 100.3 FL (ref 79.4–94.8)
PLATELET # BLD AUTO: 182 K/UL (ref 130–400)
POTASSIUM SERPL-SCNC: 4.4 MEQ/L (ref 3.4–4.9)
RBC # BLD AUTO: 3.61 M/UL (ref 4.2–5.4)
SODIUM SERPL-SCNC: 138 MEQ/L (ref 135–144)
WBC # BLD AUTO: 10.5 K/UL (ref 4.8–10.8)

## 2024-03-12 RX ORDER — ACETAMINOPHEN 160 MG
2000 TABLET,DISINTEGRATING ORAL DAILY
COMMUNITY

## 2024-03-12 RX ORDER — ONDANSETRON 4 MG/1
4 TABLET, FILM COATED ORAL EVERY 6 HOURS PRN
COMMUNITY

## 2024-03-14 NOTE — PROGRESS NOTES
SNF PROGRESS NOTE      Cc- Fall        Patient is a Michelle Le 64 y.o. female who is being seen at [facility] s/p admit for a fall. Initially she was hypotensive and required pressors in the ICU.  She continues to get hemodialysis. Her BP medications were adjusted.     Patient is laying in bed.She states that she is feeling better.         Past Medical History:   Diagnosis Date    Angina at rest 5/24/2020    Anxiety     CAD S/P percutaneous coronary angioplasty 2015, 2018    stents per Huong Sanabria    CHF (congestive heart failure) ([facility])     CKD (chronic kidney disease) stage 4, GFR 15-29 ml/min ([facility]) 2/24/2018    CKD stage 4 due to type 2 diabetes mellitus ([facility])     Contusion of right chest wall 2/16/2021    COPD (chronic obstructive pulmonary disease) ([facility])     Diabetic nephropathy with proteinuria ([facility]) 2014    DJD (degenerative joint disease) of knee     Dr Sharma    GERD (gastroesophageal reflux disease)     Hemiparesis, left ([facility]) 2013    entered Assisted Living ([place])    Hemodialysis patient ([facility])     Hemodialysis-associated hypotension 10/22/2021    History of heart failure     History of seizures     History of type C viral hepatitis     HTN (hypertension)     Hyperlipidemia     Impaired mobility and activities of daily living     Infection of venous access port 7/29/2022    Mediastinal lymphadenopathy 2013    Snageeta Cummings    Metabolic syndrome     Moderate persistent asthma without complication 9/23/2020    Need for extended care facility 7/7/2021    Neurogenic urinary incontinence 2013    Neuropathy in diabetes ([facility])     Nonrheumatic mitral valve regurgitation 7/7/2021    Nonrheumatic tricuspid valve regurgitation 7/7/2021    Obesity (BMI 30-39.9)     Recurrent UTI     S/P colonoscopy 2014    [facility], focal active colitis    Schizophrenia, paranoid, chronic ([facility])     [facility]    Small vessel disease, cerebrovascular 2013    Status

## 2024-03-15 ENCOUNTER — OFFICE VISIT (OUTPATIENT)
Dept: GERIATRIC MEDICINE | Age: 64
End: 2024-03-15
Payer: MEDICARE

## 2024-03-15 DIAGNOSIS — I25.119 CORONARY ARTERY DISEASE INVOLVING NATIVE CORONARY ARTERY OF NATIVE HEART WITH ANGINA PECTORIS (HCC): ICD-10-CM

## 2024-03-15 DIAGNOSIS — F20.0 PARANOID SCHIZOPHRENIA (HCC): ICD-10-CM

## 2024-03-15 DIAGNOSIS — Z79.4 TYPE 2 DIABETES MELLITUS WITH OTHER CIRCULATORY COMPLICATION, WITH LONG-TERM CURRENT USE OF INSULIN (HCC): ICD-10-CM

## 2024-03-15 DIAGNOSIS — Z99.2 ESRD (END STAGE RENAL DISEASE) ON DIALYSIS (HCC): ICD-10-CM

## 2024-03-15 DIAGNOSIS — R53.1 WEAKNESS: Primary | ICD-10-CM

## 2024-03-15 DIAGNOSIS — E11.59 TYPE 2 DIABETES MELLITUS WITH OTHER CIRCULATORY COMPLICATION, WITH LONG-TERM CURRENT USE OF INSULIN (HCC): ICD-10-CM

## 2024-03-15 DIAGNOSIS — N18.6 ESRD (END STAGE RENAL DISEASE) ON DIALYSIS (HCC): ICD-10-CM

## 2024-03-15 PROCEDURE — 1123F ACP DISCUSS/DSCN MKR DOCD: CPT | Performed by: NURSE PRACTITIONER

## 2024-03-15 PROCEDURE — 3046F HEMOGLOBIN A1C LEVEL >9.0%: CPT | Performed by: NURSE PRACTITIONER

## 2024-03-15 PROCEDURE — 99308 SBSQ NF CARE LOW MDM 20: CPT | Performed by: NURSE PRACTITIONER

## 2024-03-15 PROCEDURE — G8484 FLU IMMUNIZE NO ADMIN: HCPCS | Performed by: NURSE PRACTITIONER

## 2024-03-15 NOTE — PROGRESS NOTES
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 ([facility])     Type 2 diabetes mellitus with renal manifestations, controlled ([facility]) 2015    Insulin dependent, Dr Nino    Uncontrolled type 2 diabetes mellitus with hyperglycemia ([facility])     Urinary incontinence due to cognitive impairment 2013    Vitamin D deficiency 2014     Codeine and Oxycontin [oxycodone hcl]    VS reviewed    Gen- Alert and oriented x 3   Heart- RRR no murmur no LE edema   Lungs- CTA b/l no resp distress RA  oxygen   Abd- bs x 4         Assessment and Plan    Gen Weakness  PT OT   ESRD  Dialysis   Renvela   HTN  Afib   On eliquis   Metoprolol   Orthostatic hypotension   Midodrine   HLD  statin  DM  lantus  Depression/ anxiety  Abilify  sertraline      Manuela Kelley DO, FACOI     Electronically signed by: Manuela Kelley DO on 3/12/2024

## 2024-03-18 ENCOUNTER — OFFICE VISIT (OUTPATIENT)
Dept: GERIATRIC MEDICINE | Age: 64
End: 2024-03-18
Payer: MEDICARE

## 2024-03-18 DIAGNOSIS — Z99.2 ESRD (END STAGE RENAL DISEASE) ON DIALYSIS (HCC): ICD-10-CM

## 2024-03-18 DIAGNOSIS — R53.1 WEAKNESS: Primary | ICD-10-CM

## 2024-03-18 DIAGNOSIS — I48.91 ATRIAL FIBRILLATION, UNSPECIFIED TYPE (HCC): ICD-10-CM

## 2024-03-18 DIAGNOSIS — F32.A DEPRESSION, UNSPECIFIED DEPRESSION TYPE: ICD-10-CM

## 2024-03-18 DIAGNOSIS — Z79.4 TYPE 2 DIABETES MELLITUS WITH OTHER CIRCULATORY COMPLICATION, WITH LONG-TERM CURRENT USE OF INSULIN (HCC): ICD-10-CM

## 2024-03-18 DIAGNOSIS — N18.6 ESRD (END STAGE RENAL DISEASE) ON DIALYSIS (HCC): ICD-10-CM

## 2024-03-18 DIAGNOSIS — E78.5 HYPERLIPIDEMIA, UNSPECIFIED HYPERLIPIDEMIA TYPE: ICD-10-CM

## 2024-03-18 DIAGNOSIS — E11.59 TYPE 2 DIABETES MELLITUS WITH OTHER CIRCULATORY COMPLICATION, WITH LONG-TERM CURRENT USE OF INSULIN (HCC): ICD-10-CM

## 2024-03-18 PROCEDURE — 1123F ACP DISCUSS/DSCN MKR DOCD: CPT | Performed by: INTERNAL MEDICINE

## 2024-03-18 PROCEDURE — 99308 SBSQ NF CARE LOW MDM 20: CPT | Performed by: INTERNAL MEDICINE

## 2024-03-19 ENCOUNTER — OFFICE VISIT (OUTPATIENT)
Dept: GERIATRIC MEDICINE | Age: 64
End: 2024-03-19
Payer: MEDICARE

## 2024-03-19 DIAGNOSIS — F20.0 PARANOID SCHIZOPHRENIA (HCC): ICD-10-CM

## 2024-03-19 DIAGNOSIS — Z99.2 ESRD (END STAGE RENAL DISEASE) ON DIALYSIS (HCC): ICD-10-CM

## 2024-03-19 DIAGNOSIS — N18.6 ESRD (END STAGE RENAL DISEASE) ON DIALYSIS (HCC): ICD-10-CM

## 2024-03-19 DIAGNOSIS — R53.1 WEAKNESS: Primary | ICD-10-CM

## 2024-03-19 LAB
ANION GAP SERPL CALCULATED.3IONS-SCNC: 19 MEQ/L (ref 9–15)
BUN SERPL-MCNC: 36 MG/DL (ref 8–23)
CALCIUM SERPL-MCNC: 8.8 MG/DL (ref 8.5–9.9)
CHLORIDE SERPL-SCNC: 88 MEQ/L (ref 95–107)
CO2 SERPL-SCNC: 23 MEQ/L (ref 20–31)
CREAT SERPL-MCNC: 6.35 MG/DL (ref 0.5–0.9)
ERYTHROCYTE [DISTWIDTH] IN BLOOD BY AUTOMATED COUNT: 16.7 % (ref 11.5–14.5)
GLUCOSE SERPL-MCNC: 121 MG/DL (ref 70–99)
HCT VFR BLD AUTO: 38.2 % (ref 37–47)
HGB BLD-MCNC: 12.2 G/DL (ref 12–16)
MCH RBC QN AUTO: 32.8 PG (ref 27–31.3)
MCHC RBC AUTO-ENTMCNC: 31.9 % (ref 33–37)
MCV RBC AUTO: 102.7 FL (ref 79.4–94.8)
PLATELET # BLD AUTO: 190 K/UL (ref 130–400)
POTASSIUM SERPL-SCNC: 5 MEQ/L (ref 3.4–4.9)
RBC # BLD AUTO: 3.72 M/UL (ref 4.2–5.4)
SODIUM SERPL-SCNC: 130 MEQ/L (ref 135–144)
WBC # BLD AUTO: 13.9 K/UL (ref 4.8–10.8)

## 2024-03-19 PROCEDURE — 99308 SBSQ NF CARE LOW MDM 20: CPT | Performed by: NURSE PRACTITIONER

## 2024-03-19 PROCEDURE — 1123F ACP DISCUSS/DSCN MKR DOCD: CPT | Performed by: NURSE PRACTITIONER

## 2024-03-19 PROCEDURE — G8484 FLU IMMUNIZE NO ADMIN: HCPCS | Performed by: NURSE PRACTITIONER

## 2024-03-19 NOTE — PROGRESS NOTES
Name: Michelle Le   [address] [address] [place]  [address]  Date: 3/15/2024     Subjective:     Chief Complaint   Patient presents with    Follow-up       HPI  Michelle Le is a 64 y.o. female being seen today for evaluation of acute rehab progress status post weakness and falls, paranoid schizophrenia, coronary artery disease, diabetes, and end-stage renal disease.  Blood sugars have been lower, Lantus has been decreased to 35 units. Dialysis 3 days a week.  Discontinue sliding scale insulin.  She fell at home, is weak abnormal gait, has bruising at the right jawline after the fall complains of right hip pain, on Tylenol with good effect.  She is participating in PT and OT, making progress.  She had been complaining of a sore throat, it is improved.  Vital signs stable no fever.    Past Medical History:   Diagnosis Date    Angina at rest 5/24/2020    Anxiety     CAD S/P percutaneous coronary angioplasty 2015, 2018    stents per Huong Sanabria    CHF (congestive heart failure) ([facility])     CKD (chronic kidney disease) stage 4, GFR 15-29 ml/min ([facility]) 2/24/2018    CKD stage 4 due to type 2 diabetes mellitus ([facility])     Contusion of right chest wall 2/16/2021    COPD (chronic obstructive pulmonary disease) ([facility])     Diabetic nephropathy with proteinuria ([facility]) 2014    DJD (degenerative joint disease) of knee     Dr Sharma    GERD (gastroesophageal reflux disease)     Hemiparesis, left ([facility]) 2013    entered Assisted Living ([place])    Hemodialysis patient ([facility])     Hemodialysis-associated hypotension 10/22/2021    History of heart failure     History of seizures     History of type C viral hepatitis     HTN (hypertension)     Hyperlipidemia     Impaired mobility and activities of daily living     Infection of venous access port 7/29/2022    Mediastinal lymphadenopathy 2013    Sangeeta Cummings    Metabolic syndrome     Moderate persistent asthma without complication

## 2024-03-19 NOTE — PROGRESS NOTES
SNF PROGRESS NOTE      Cc- Fall        Patient is a Michelle Le 64 y.o. female who is being seen at [facility] s/p admit for a fall. Initially she was hypotensive and required pressors in the ICU.  She continues to get hemodialysis. Her BP medications were adjusted.        Patient is sitting in the chair in her room. She states that therapy is going well.         Past Medical History:   Diagnosis Date    Angina at rest 5/24/2020    Anxiety     CAD S/P percutaneous coronary angioplasty 2015, 2018    stents per Huong Sanabria    CHF (congestive heart failure) ([facility])     CKD (chronic kidney disease) stage 4, GFR 15-29 ml/min ([facility]) 2/24/2018    CKD stage 4 due to type 2 diabetes mellitus ([facility])     Contusion of right chest wall 2/16/2021    COPD (chronic obstructive pulmonary disease) ([facility])     Diabetic nephropathy with proteinuria ([facility]) 2014    DJD (degenerative joint disease) of knee     Dr Sharma    GERD (gastroesophageal reflux disease)     Hemiparesis, left ([facility]) 2013    entered Assisted Living ([place])    Hemodialysis patient ([facility])     Hemodialysis-associated hypotension 10/22/2021    History of heart failure     History of seizures     History of type C viral hepatitis     HTN (hypertension)     Hyperlipidemia     Impaired mobility and activities of daily living     Infection of venous access port 7/29/2022    Mediastinal lymphadenopathy 2013    Sangeeta Cummings    Metabolic syndrome     Moderate persistent asthma without complication 9/23/2020    Need for extended care facility 7/7/2021    Neurogenic urinary incontinence 2013    Neuropathy in diabetes ([facility])     Nonrheumatic mitral valve regurgitation 7/7/2021    Nonrheumatic tricuspid valve regurgitation 7/7/2021    Obesity (BMI 30-39.9)     Recurrent UTI     S/P colonoscopy 2014    [facility], focal active colitis    Schizophrenia, paranoid, chronic ([facility])     [facility]    Small vessel disease,

## 2024-03-20 ENCOUNTER — OFFICE VISIT (OUTPATIENT)
Dept: GERIATRIC MEDICINE | Age: 64
End: 2024-03-20

## 2024-03-20 DIAGNOSIS — N18.6 ESRD (END STAGE RENAL DISEASE) ON DIALYSIS (HCC): ICD-10-CM

## 2024-03-20 DIAGNOSIS — Z99.2 ESRD (END STAGE RENAL DISEASE) ON DIALYSIS (HCC): ICD-10-CM

## 2024-03-20 DIAGNOSIS — Z79.4 TYPE 2 DIABETES MELLITUS WITH OTHER CIRCULATORY COMPLICATION, WITH LONG-TERM CURRENT USE OF INSULIN (HCC): ICD-10-CM

## 2024-03-20 DIAGNOSIS — R53.1 WEAKNESS: Primary | ICD-10-CM

## 2024-03-20 DIAGNOSIS — I25.119 CORONARY ARTERY DISEASE INVOLVING NATIVE CORONARY ARTERY OF NATIVE HEART WITH ANGINA PECTORIS (HCC): ICD-10-CM

## 2024-03-20 DIAGNOSIS — F32.A DEPRESSION, UNSPECIFIED DEPRESSION TYPE: ICD-10-CM

## 2024-03-20 DIAGNOSIS — I48.91 ATRIAL FIBRILLATION, UNSPECIFIED TYPE (HCC): ICD-10-CM

## 2024-03-20 DIAGNOSIS — F20.0 PARANOID SCHIZOPHRENIA (HCC): ICD-10-CM

## 2024-03-20 DIAGNOSIS — E78.5 HYPERLIPIDEMIA, UNSPECIFIED HYPERLIPIDEMIA TYPE: ICD-10-CM

## 2024-03-20 DIAGNOSIS — E11.59 TYPE 2 DIABETES MELLITUS WITH OTHER CIRCULATORY COMPLICATION, WITH LONG-TERM CURRENT USE OF INSULIN (HCC): ICD-10-CM

## 2024-03-21 ENCOUNTER — OFFICE VISIT (OUTPATIENT)
Dept: GERIATRIC MEDICINE | Age: 64
End: 2024-03-21

## 2024-03-21 DIAGNOSIS — E78.5 HYPERLIPIDEMIA, UNSPECIFIED HYPERLIPIDEMIA TYPE: ICD-10-CM

## 2024-03-21 DIAGNOSIS — E11.59 TYPE 2 DIABETES MELLITUS WITH OTHER CIRCULATORY COMPLICATION, WITH LONG-TERM CURRENT USE OF INSULIN (HCC): ICD-10-CM

## 2024-03-21 DIAGNOSIS — R53.1 WEAKNESS: Primary | ICD-10-CM

## 2024-03-21 DIAGNOSIS — N18.6 ESRD (END STAGE RENAL DISEASE) ON DIALYSIS (HCC): ICD-10-CM

## 2024-03-21 DIAGNOSIS — I25.119 CORONARY ARTERY DISEASE INVOLVING NATIVE CORONARY ARTERY OF NATIVE HEART WITH ANGINA PECTORIS (HCC): ICD-10-CM

## 2024-03-21 DIAGNOSIS — Z79.4 TYPE 2 DIABETES MELLITUS WITH OTHER CIRCULATORY COMPLICATION, WITH LONG-TERM CURRENT USE OF INSULIN (HCC): ICD-10-CM

## 2024-03-21 DIAGNOSIS — F32.A DEPRESSION, UNSPECIFIED DEPRESSION TYPE: ICD-10-CM

## 2024-03-21 DIAGNOSIS — Z99.2 ESRD (END STAGE RENAL DISEASE) ON DIALYSIS (HCC): ICD-10-CM

## 2024-03-21 DIAGNOSIS — I48.91 ATRIAL FIBRILLATION, UNSPECIFIED TYPE (HCC): ICD-10-CM

## 2024-03-21 DIAGNOSIS — F20.0 PARANOID SCHIZOPHRENIA (HCC): ICD-10-CM

## 2024-03-21 NOTE — PROGRESS NOTES
SNF PROGRESS NOTE      Cc- fall      Patient is a Michelle Le 64 y.o. female who is being seen at [facility] s/p admit for a fall. Initially she was hypotensive and required pressors in the ICU.  She continues to get hemodialysis. Her BP medications were adjusted.         Patient is sitting in the chair in her room,. Her weight is up quite a few lbs. She states that she didn't think dialysis could take the fluid off because her BP drops.         Past Medical History:   Diagnosis Date    Angina at rest 5/24/2020    Anxiety     CAD S/P percutaneous coronary angioplasty 2015, 2018    stents per Huong Sanabria    CHF (congestive heart failure) ([facility])     CKD (chronic kidney disease) stage 4, GFR 15-29 ml/min ([facility]) 2/24/2018    CKD stage 4 due to type 2 diabetes mellitus ([facility])     Contusion of right chest wall 2/16/2021    COPD (chronic obstructive pulmonary disease) ([facility])     Diabetic nephropathy with proteinuria ([facility]) 2014    DJD (degenerative joint disease) of knee     Dr Sharma    GERD (gastroesophageal reflux disease)     Hemiparesis, left ([facility]) 2013    entered Assisted Living ([place])    Hemodialysis patient ([facility])     Hemodialysis-associated hypotension 10/22/2021    History of heart failure     History of seizures     History of type C viral hepatitis     HTN (hypertension)     Hyperlipidemia     Impaired mobility and activities of daily living     Infection of venous access port 7/29/2022    Mediastinal lymphadenopathy 2013    Sangeeta Cummings    Metabolic syndrome     Moderate persistent asthma without complication 9/23/2020    Need for extended care facility 7/7/2021    Neurogenic urinary incontinence 2013    Neuropathy in diabetes ([facility])     Nonrheumatic mitral valve regurgitation 7/7/2021    Nonrheumatic tricuspid valve regurgitation 7/7/2021    Obesity (BMI 30-39.9)     Recurrent UTI     S/P colonoscopy 2014    [facility], focal active colitis     none

## 2024-03-24 ENCOUNTER — OFFICE VISIT (OUTPATIENT)
Dept: GERIATRIC MEDICINE | Age: 64
End: 2024-03-24
Payer: MEDICARE

## 2024-03-24 DIAGNOSIS — N18.6 ESRD (END STAGE RENAL DISEASE) ON DIALYSIS (HCC): ICD-10-CM

## 2024-03-24 DIAGNOSIS — Z99.2 ESRD (END STAGE RENAL DISEASE) ON DIALYSIS (HCC): ICD-10-CM

## 2024-03-24 DIAGNOSIS — I48.91 ATRIAL FIBRILLATION, UNSPECIFIED TYPE (HCC): ICD-10-CM

## 2024-03-24 DIAGNOSIS — F20.0 PARANOID SCHIZOPHRENIA (HCC): ICD-10-CM

## 2024-03-24 DIAGNOSIS — F32.A DEPRESSION, UNSPECIFIED DEPRESSION TYPE: ICD-10-CM

## 2024-03-24 DIAGNOSIS — E11.59 TYPE 2 DIABETES MELLITUS WITH OTHER CIRCULATORY COMPLICATION, WITH LONG-TERM CURRENT USE OF INSULIN (HCC): ICD-10-CM

## 2024-03-24 DIAGNOSIS — Z79.4 TYPE 2 DIABETES MELLITUS WITH OTHER CIRCULATORY COMPLICATION, WITH LONG-TERM CURRENT USE OF INSULIN (HCC): ICD-10-CM

## 2024-03-24 DIAGNOSIS — E78.5 HYPERLIPIDEMIA, UNSPECIFIED HYPERLIPIDEMIA TYPE: ICD-10-CM

## 2024-03-24 DIAGNOSIS — R53.1 WEAKNESS: Primary | ICD-10-CM

## 2024-03-24 PROCEDURE — 1123F ACP DISCUSS/DSCN MKR DOCD: CPT | Performed by: INTERNAL MEDICINE

## 2024-03-24 PROCEDURE — G8484 FLU IMMUNIZE NO ADMIN: HCPCS | Performed by: INTERNAL MEDICINE

## 2024-03-24 PROCEDURE — 3046F HEMOGLOBIN A1C LEVEL >9.0%: CPT | Performed by: INTERNAL MEDICINE

## 2024-03-24 PROCEDURE — 99308 SBSQ NF CARE LOW MDM 20: CPT | Performed by: INTERNAL MEDICINE

## 2024-03-25 ENCOUNTER — OFFICE VISIT (OUTPATIENT)
Dept: GERIATRIC MEDICINE | Age: 64
End: 2024-03-25
Payer: MEDICARE

## 2024-03-25 DIAGNOSIS — F32.A DEPRESSION, UNSPECIFIED DEPRESSION TYPE: ICD-10-CM

## 2024-03-25 DIAGNOSIS — I48.91 ATRIAL FIBRILLATION, UNSPECIFIED TYPE (HCC): ICD-10-CM

## 2024-03-25 DIAGNOSIS — E11.59 TYPE 2 DIABETES MELLITUS WITH OTHER CIRCULATORY COMPLICATION, WITH LONG-TERM CURRENT USE OF INSULIN (HCC): ICD-10-CM

## 2024-03-25 DIAGNOSIS — E78.5 HYPERLIPIDEMIA, UNSPECIFIED HYPERLIPIDEMIA TYPE: ICD-10-CM

## 2024-03-25 DIAGNOSIS — N18.6 ESRD (END STAGE RENAL DISEASE) ON DIALYSIS (HCC): ICD-10-CM

## 2024-03-25 DIAGNOSIS — F20.0 PARANOID SCHIZOPHRENIA (HCC): ICD-10-CM

## 2024-03-25 DIAGNOSIS — Z99.2 ESRD (END STAGE RENAL DISEASE) ON DIALYSIS (HCC): ICD-10-CM

## 2024-03-25 DIAGNOSIS — R53.1 WEAKNESS: Primary | ICD-10-CM

## 2024-03-25 DIAGNOSIS — Z79.4 TYPE 2 DIABETES MELLITUS WITH OTHER CIRCULATORY COMPLICATION, WITH LONG-TERM CURRENT USE OF INSULIN (HCC): ICD-10-CM

## 2024-03-25 PROCEDURE — 1123F ACP DISCUSS/DSCN MKR DOCD: CPT | Performed by: INTERNAL MEDICINE

## 2024-03-25 PROCEDURE — 3046F HEMOGLOBIN A1C LEVEL >9.0%: CPT | Performed by: INTERNAL MEDICINE

## 2024-03-25 PROCEDURE — 99308 SBSQ NF CARE LOW MDM 20: CPT | Performed by: INTERNAL MEDICINE

## 2024-03-25 PROCEDURE — G8484 FLU IMMUNIZE NO ADMIN: HCPCS | Performed by: INTERNAL MEDICINE

## 2024-03-26 ENCOUNTER — OFFICE VISIT (OUTPATIENT)
Dept: GERIATRIC MEDICINE | Age: 64
End: 2024-03-26

## 2024-03-26 DIAGNOSIS — Z99.2 ESRD (END STAGE RENAL DISEASE) ON DIALYSIS (HCC): ICD-10-CM

## 2024-03-26 DIAGNOSIS — N18.6 ESRD (END STAGE RENAL DISEASE) ON DIALYSIS (HCC): ICD-10-CM

## 2024-03-26 DIAGNOSIS — R53.1 WEAKNESS: Primary | ICD-10-CM

## 2024-03-26 NOTE — PROGRESS NOTES
Name: Michelle Le   Facility: [facility]  [address] [place]  [address]  Date: 3/19/2024     Subjective:     Chief Complaint   Patient presents with    Follow-up       HPI  Michelle Le is a 64 y.o. female being seen today for evaluation of acute rehab progress status post weakness and falls, paranoid schizophrenia, and end-stage renal disease.  Consultant pharmacist recommending evaluating resident for the need of antipsychotic medications per federal regulation.  She is being followed by by psych and will follow up to determine appropriateness.  On these medications for paranoid schizophrenia.  Dialysis 3 days a week.  Continues to be weak with abnormal gait, has bruising at the right jawline after the fall which is almost gone.  She is participating in PT and OT, making progress.  Vital signs stable no fever.    Past Medical History:   Diagnosis Date    Angina at rest 5/24/2020    Anxiety     CAD S/P percutaneous coronary angioplasty 2015, 2018    stents per Huong Sanabria    CHF (congestive heart failure) ([facility])     CKD (chronic kidney disease) stage 4, GFR 15-29 ml/min ([facility]) 2/24/2018    CKD stage 4 due to type 2 diabetes mellitus ([facility])     Contusion of right chest wall 2/16/2021    COPD (chronic obstructive pulmonary disease) ([facility])     Diabetic nephropathy with proteinuria ([facility]) 2014    DJD (degenerative joint disease) of knee     Dr Sharma    GERD (gastroesophageal reflux disease)     Hemiparesis, left ([facility]) 2013    entered Assisted Living ([place])    Hemodialysis patient ([facility])     Hemodialysis-associated hypotension 10/22/2021    History of heart failure     History of seizures     History of type C viral hepatitis     HTN (hypertension)     Hyperlipidemia     Impaired mobility and activities of daily living     Infection of venous access port 7/29/2022    Mediastinal lymphadenopathy 2013    Sangeeta Cummings    Metabolic syndrome     Moderate persistent

## 2024-03-27 ENCOUNTER — OFFICE VISIT (OUTPATIENT)
Dept: GERIATRIC MEDICINE | Age: 64
End: 2024-03-27

## 2024-03-27 DIAGNOSIS — E78.5 HYPERLIPIDEMIA, UNSPECIFIED HYPERLIPIDEMIA TYPE: ICD-10-CM

## 2024-03-27 DIAGNOSIS — F20.0 PARANOID SCHIZOPHRENIA (HCC): ICD-10-CM

## 2024-03-27 DIAGNOSIS — I48.91 ATRIAL FIBRILLATION, UNSPECIFIED TYPE (HCC): ICD-10-CM

## 2024-03-27 DIAGNOSIS — Z79.4 TYPE 2 DIABETES MELLITUS WITH OTHER CIRCULATORY COMPLICATION, WITH LONG-TERM CURRENT USE OF INSULIN (HCC): ICD-10-CM

## 2024-03-27 DIAGNOSIS — R53.1 WEAKNESS: Primary | ICD-10-CM

## 2024-03-27 DIAGNOSIS — N18.6 ESRD (END STAGE RENAL DISEASE) ON DIALYSIS (HCC): ICD-10-CM

## 2024-03-27 DIAGNOSIS — Z99.2 ESRD (END STAGE RENAL DISEASE) ON DIALYSIS (HCC): ICD-10-CM

## 2024-03-27 DIAGNOSIS — E11.59 TYPE 2 DIABETES MELLITUS WITH OTHER CIRCULATORY COMPLICATION, WITH LONG-TERM CURRENT USE OF INSULIN (HCC): ICD-10-CM

## 2024-03-27 DIAGNOSIS — F32.A DEPRESSION, UNSPECIFIED DEPRESSION TYPE: ICD-10-CM

## 2024-03-27 LAB
ANION GAP SERPL CALCULATED.3IONS-SCNC: 14 MEQ/L (ref 9–15)
BUN SERPL-MCNC: 21 MG/DL (ref 8–23)
CALCIUM SERPL-MCNC: 9 MG/DL (ref 8.5–9.9)
CHLORIDE SERPL-SCNC: 93 MEQ/L (ref 95–107)
CO2 SERPL-SCNC: 28 MEQ/L (ref 20–31)
CREAT SERPL-MCNC: 4.94 MG/DL (ref 0.5–0.9)
ERYTHROCYTE [DISTWIDTH] IN BLOOD BY AUTOMATED COUNT: 16.8 % (ref 11.5–14.5)
GLUCOSE SERPL-MCNC: 90 MG/DL (ref 70–99)
HCT VFR BLD AUTO: 36.6 % (ref 37–47)
HGB BLD-MCNC: 11.7 G/DL (ref 12–16)
MCH RBC QN AUTO: 32.7 PG (ref 27–31.3)
MCHC RBC AUTO-ENTMCNC: 32 % (ref 33–37)
MCV RBC AUTO: 102.2 FL (ref 79.4–94.8)
PLATELET # BLD AUTO: 104 K/UL (ref 130–400)
POTASSIUM SERPL-SCNC: 5.7 MEQ/L (ref 3.4–4.9)
RBC # BLD AUTO: 3.58 M/UL (ref 4.2–5.4)
SODIUM SERPL-SCNC: 135 MEQ/L (ref 135–144)
WBC # BLD AUTO: 8.7 K/UL (ref 4.8–10.8)

## 2024-03-27 NOTE — PROGRESS NOTES
SNF PROGRESS NOTE      Cc- fall         Patient is a Michelle Le 64 y.o. female who is being seen at [facility] s/p admit for a fall. Initially she was hypotensive and required pressors in the ICU.  She continues to get hemodialysis. Her BP medications were adjusted.            Patient is sitting in the chair. She remains up in weight. She doesn't produce urine. She isn't having any sob .          Past Medical History:   Diagnosis Date    Angina at rest 5/24/2020    Anxiety     CAD S/P percutaneous coronary angioplasty 2015, 2018    stents per Huong Sanabria    CHF (congestive heart failure) ([facility])     CKD (chronic kidney disease) stage 4, GFR 15-29 ml/min ([facility]) 2/24/2018    CKD stage 4 due to type 2 diabetes mellitus ([facility])     Contusion of right chest wall 2/16/2021    COPD (chronic obstructive pulmonary disease) ([facility])     Diabetic nephropathy with proteinuria ([facility]) 2014    DJD (degenerative joint disease) of knee     Dr Sharma    GERD (gastroesophageal reflux disease)     Hemiparesis, left ([facility]) 2013    entered Assisted Living ([place])    Hemodialysis patient ([facility])     Hemodialysis-associated hypotension 10/22/2021    History of heart failure     History of seizures     History of type C viral hepatitis     HTN (hypertension)     Hyperlipidemia     Impaired mobility and activities of daily living     Infection of venous access port 7/29/2022    Mediastinal lymphadenopathy 2013    Sangeeta Cummings    Metabolic syndrome     Moderate persistent asthma without complication 9/23/2020    Need for extended care facility 7/7/2021    Neurogenic urinary incontinence 2013    Neuropathy in diabetes ([facility])     Nonrheumatic mitral valve regurgitation 7/7/2021    Nonrheumatic tricuspid valve regurgitation 7/7/2021    Obesity (BMI 30-39.9)     Recurrent UTI     S/P colonoscopy 2014    [facility], focal active colitis    Schizophrenia, paranoid, chronic ([facility])     [facility]

## 2024-03-27 NOTE — PROGRESS NOTES
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 (HCC)     Type 2 diabetes mellitus with renal manifestations, controlled ([facility]) 2015    Insulin dependent, Dr Nino    Uncontrolled type 2 diabetes mellitus with hyperglycemia ([facility])     Urinary incontinence due to cognitive impairment 2013    Vitamin D deficiency 2014     Codeine and Oxycontin [oxycodone hcl]    VS reviewed    Gen- Alert and oriented x 3   Heart- RRR no murmur no LE edema   Lungs- CTA b/l no resp distress RA  oxygen   Abd- bs x 4          Assessment and Plan    Gen Weakness  PT OT   ESRD  Dialysis   Renvela   HTN  Afib   On eliquis   Metoprolol   Orthostatic hypotension   Midodrine   HLD  statin  DM  lantus  Depression/ anxiety  Abilify  sertraline      Manuela Kelley DO, DRU     Electronically signed by: Manuela Kelley DO on 3/24/2024

## 2024-03-28 ENCOUNTER — OFFICE VISIT (OUTPATIENT)
Dept: GERIATRIC MEDICINE | Age: 64
End: 2024-03-28
Payer: MEDICARE

## 2024-03-28 DIAGNOSIS — R53.1 WEAKNESS: Primary | ICD-10-CM

## 2024-03-28 DIAGNOSIS — E11.59 TYPE 2 DIABETES MELLITUS WITH OTHER CIRCULATORY COMPLICATION, WITH LONG-TERM CURRENT USE OF INSULIN (HCC): ICD-10-CM

## 2024-03-28 DIAGNOSIS — F20.0 PARANOID SCHIZOPHRENIA (HCC): ICD-10-CM

## 2024-03-28 DIAGNOSIS — N18.6 ESRD (END STAGE RENAL DISEASE) ON DIALYSIS (HCC): ICD-10-CM

## 2024-03-28 DIAGNOSIS — F32.A DEPRESSION, UNSPECIFIED DEPRESSION TYPE: ICD-10-CM

## 2024-03-28 DIAGNOSIS — I48.91 ATRIAL FIBRILLATION, UNSPECIFIED TYPE (HCC): ICD-10-CM

## 2024-03-28 DIAGNOSIS — Z99.2 ESRD (END STAGE RENAL DISEASE) ON DIALYSIS (HCC): ICD-10-CM

## 2024-03-28 DIAGNOSIS — Z79.4 TYPE 2 DIABETES MELLITUS WITH OTHER CIRCULATORY COMPLICATION, WITH LONG-TERM CURRENT USE OF INSULIN (HCC): ICD-10-CM

## 2024-03-28 DIAGNOSIS — E78.5 HYPERLIPIDEMIA, UNSPECIFIED HYPERLIPIDEMIA TYPE: ICD-10-CM

## 2024-03-28 PROCEDURE — 99308 SBSQ NF CARE LOW MDM 20: CPT | Performed by: INTERNAL MEDICINE

## 2024-03-28 PROCEDURE — G8484 FLU IMMUNIZE NO ADMIN: HCPCS | Performed by: INTERNAL MEDICINE

## 2024-03-28 PROCEDURE — G9692 HOSP RECD BY PT DUR MSMT PER: HCPCS | Performed by: INTERNAL MEDICINE

## 2024-03-28 PROCEDURE — G9687 HOSPICE ANYTIME MSMT PER: HCPCS | Performed by: INTERNAL MEDICINE

## 2024-03-29 ENCOUNTER — HOSPITAL ENCOUNTER (INPATIENT)
Age: 64
LOS: 14 days | Discharge: NURSING, REHAB OR LONG-TERM CARE | End: 2024-04-12
Attending: INTERNAL MEDICINE | Admitting: INTERNAL MEDICINE
Payer: MEDICARE

## 2024-03-29 ENCOUNTER — APPOINTMENT (OUTPATIENT)
Dept: GENERAL RADIOLOGY | Age: 64
End: 2024-03-29
Payer: MEDICARE

## 2024-03-29 ENCOUNTER — OFFICE VISIT (OUTPATIENT)
Dept: GERIATRIC MEDICINE | Age: 64
End: 2024-03-29
Payer: MEDICARE

## 2024-03-29 DIAGNOSIS — Z99.2 ESRD ON DIALYSIS (HCC): ICD-10-CM

## 2024-03-29 DIAGNOSIS — Z79.4 TYPE 2 DIABETES MELLITUS WITH OTHER CIRCULATORY COMPLICATION, WITH LONG-TERM CURRENT USE OF INSULIN (HCC): ICD-10-CM

## 2024-03-29 DIAGNOSIS — I50.9 HEART FAILURE, UNSPECIFIED HF CHRONICITY, UNSPECIFIED HEART FAILURE TYPE (HCC): ICD-10-CM

## 2024-03-29 DIAGNOSIS — E78.5 HYPERLIPIDEMIA, UNSPECIFIED HYPERLIPIDEMIA TYPE: ICD-10-CM

## 2024-03-29 DIAGNOSIS — N18.6 ESRD (END STAGE RENAL DISEASE) ON DIALYSIS (HCC): ICD-10-CM

## 2024-03-29 DIAGNOSIS — F20.0 PARANOID SCHIZOPHRENIA (HCC): ICD-10-CM

## 2024-03-29 DIAGNOSIS — N18.6 ESRD ON DIALYSIS (HCC): ICD-10-CM

## 2024-03-29 DIAGNOSIS — I48.91 ATRIAL FIBRILLATION, UNSPECIFIED TYPE (HCC): ICD-10-CM

## 2024-03-29 DIAGNOSIS — I50.9 ACUTE DECOMPENSATED HEART FAILURE (HCC): Primary | ICD-10-CM

## 2024-03-29 DIAGNOSIS — I95.9 HYPOTENSION, UNSPECIFIED HYPOTENSION TYPE: ICD-10-CM

## 2024-03-29 DIAGNOSIS — Z99.2 ESRD (END STAGE RENAL DISEASE) ON DIALYSIS (HCC): ICD-10-CM

## 2024-03-29 DIAGNOSIS — E11.59 TYPE 2 DIABETES MELLITUS WITH OTHER CIRCULATORY COMPLICATION, WITH LONG-TERM CURRENT USE OF INSULIN (HCC): ICD-10-CM

## 2024-03-29 DIAGNOSIS — F32.A DEPRESSION, UNSPECIFIED DEPRESSION TYPE: ICD-10-CM

## 2024-03-29 DIAGNOSIS — R53.1 WEAKNESS: Primary | ICD-10-CM

## 2024-03-29 PROBLEM — Z71.89 ENCOUNTER FOR HOSPICE CARE DISCUSSION: Status: ACTIVE | Noted: 2024-03-29

## 2024-03-29 PROBLEM — E87.70 FLUID OVERLOAD: Status: ACTIVE | Noted: 2024-03-29

## 2024-03-29 PROBLEM — Z71.89 ADVANCED CARE PLANNING/COUNSELING DISCUSSION: Status: ACTIVE | Noted: 2024-03-29

## 2024-03-29 PROBLEM — Z51.5 PALLIATIVE CARE ENCOUNTER: Status: ACTIVE | Noted: 2024-03-29

## 2024-03-29 PROBLEM — Z71.89 GOALS OF CARE, COUNSELING/DISCUSSION: Status: ACTIVE | Noted: 2024-03-29

## 2024-03-29 LAB
ALBUMIN SERPL-MCNC: 4.2 G/DL (ref 3.5–4.6)
ALP SERPL-CCNC: 97 U/L (ref 40–130)
ALT SERPL-CCNC: 16 U/L (ref 0–33)
ANION GAP SERPL CALCULATED.3IONS-SCNC: 17 MEQ/L (ref 9–15)
APTT PPP: 34.1 SEC (ref 24.4–36.8)
AST SERPL-CCNC: 38 U/L (ref 0–35)
BASE EXCESS ARTERIAL: 6 (ref -3–3)
BASOPHILS # BLD: 0.1 K/UL (ref 0–0.2)
BASOPHILS NFR BLD: 0.6 %
BILIRUB SERPL-MCNC: 1.3 MG/DL (ref 0.2–0.7)
BNP BLD-MCNC: NORMAL PG/ML
BUN SERPL-MCNC: 24 MG/DL (ref 8–23)
CALCIUM IONIZED: 0.95 MMOL/L (ref 1.12–1.32)
CALCIUM SERPL-MCNC: 8.9 MG/DL (ref 8.5–9.9)
CHLORIDE SERPL-SCNC: 91 MEQ/L (ref 95–107)
CO2 SERPL-SCNC: 28 MEQ/L (ref 20–31)
CREAT SERPL-MCNC: 5.15 MG/DL (ref 0.5–0.9)
EOSINOPHIL # BLD: 0.1 K/UL (ref 0–0.7)
EOSINOPHIL NFR BLD: 1.3 %
ERYTHROCYTE [DISTWIDTH] IN BLOOD BY AUTOMATED COUNT: 17.2 % (ref 11.5–14.5)
GLOBULIN SER CALC-MCNC: 3.1 G/DL (ref 2.3–3.5)
GLUCOSE BLD-MCNC: 139 MG/DL (ref 70–99)
GLUCOSE BLD-MCNC: 156 MG/DL (ref 70–99)
GLUCOSE BLD-MCNC: 214 MG/DL (ref 70–99)
GLUCOSE SERPL-MCNC: 155 MG/DL (ref 70–99)
HCO3 ARTERIAL: 29.1 MMOL/L (ref 21–29)
HCT VFR BLD AUTO: 38 % (ref 36–48)
HCT VFR BLD AUTO: 38.1 % (ref 37–47)
HGB BLD CALC-MCNC: 13 GM/DL (ref 12–16)
HGB BLD-MCNC: 12.3 G/DL (ref 12–16)
INFLUENZA A BY PCR: NEGATIVE
INFLUENZA B BY PCR: NEGATIVE
INR PPP: 1.8
LACTATE BLDV-SCNC: 2.9 MMOL/L (ref 0.5–2.2)
LACTATE: 2.54 MMOL/L (ref 0.4–2)
LYMPHOCYTES # BLD: 1.6 K/UL (ref 1–4.8)
LYMPHOCYTES NFR BLD: 18.1 %
MAGNESIUM SERPL-MCNC: 2.1 MG/DL (ref 1.7–2.4)
MCH RBC QN AUTO: 33.6 PG (ref 27–31.3)
MCHC RBC AUTO-ENTMCNC: 32.3 % (ref 33–37)
MCV RBC AUTO: 104.1 FL (ref 79.4–94.8)
MONOCYTES # BLD: 0.9 K/UL (ref 0.2–0.8)
MONOCYTES NFR BLD: 10.2 %
NEUTROPHILS # BLD: 6.2 K/UL (ref 1.4–6.5)
NEUTS SEG NFR BLD: 69.5 %
O2 SAT, ARTERIAL: 91 % (ref 93–100)
PCO2 ARTERIAL: 37 MM HG (ref 35–45)
PERFORMED ON: ABNORMAL
PH ARTERIAL: 7.51 (ref 7.35–7.45)
PLATELET # BLD AUTO: 165 K/UL (ref 130–400)
PO2 ARTERIAL: 55 MM HG (ref 75–108)
POC CHLORIDE: 97 MEQ/L (ref 99–110)
POC CREATININE: 6.5 MG/DL (ref 0.6–1.2)
POC SAMPLE TYPE: ABNORMAL
POTASSIUM SERPL-SCNC: 5 MEQ/L (ref 3.4–4.9)
POTASSIUM SERPL-SCNC: 5.3 MEQ/L (ref 3.5–5.1)
POTASSIUM SERPL-SCNC: 5.7 MEQ/L (ref 3.4–4.9)
PROCALCITONIN SERPL IA-MCNC: 0.27 NG/ML (ref 0–0.15)
PROT SERPL-MCNC: 7.3 G/DL (ref 6.3–8)
PROTHROMBIN TIME: 21.3 SEC (ref 12.3–14.9)
RBC # BLD AUTO: 3.66 M/UL (ref 4.2–5.4)
SARS-COV-2 RDRP RESP QL NAA+PROBE: NOT DETECTED
SODIUM BLD-SCNC: 133 MEQ/L (ref 136–145)
SODIUM SERPL-SCNC: 136 MEQ/L (ref 135–144)
TCO2 ARTERIAL: 30 MMOL/L (ref 21–32)
TROPONIN, HIGH SENSITIVITY: 129 NG/L (ref 0–19)
TROPONIN, HIGH SENSITIVITY: 130 NG/L (ref 0–19)
WBC # BLD AUTO: 9 K/UL (ref 4.8–10.8)

## 2024-03-29 PROCEDURE — 94640 AIRWAY INHALATION TREATMENT: CPT

## 2024-03-29 PROCEDURE — G9692 HOSP RECD BY PT DUR MSMT PER: HCPCS | Performed by: INTERNAL MEDICINE

## 2024-03-29 PROCEDURE — P9047 ALBUMIN (HUMAN), 25%, 50ML: HCPCS

## 2024-03-29 PROCEDURE — 99285 EMERGENCY DEPT VISIT HI MDM: CPT

## 2024-03-29 PROCEDURE — 71045 X-RAY EXAM CHEST 1 VIEW: CPT

## 2024-03-29 PROCEDURE — 94761 N-INVAS EAR/PLS OXIMETRY MLT: CPT

## 2024-03-29 PROCEDURE — 85610 PROTHROMBIN TIME: CPT

## 2024-03-29 PROCEDURE — 36415 COLL VENOUS BLD VENIPUNCTURE: CPT

## 2024-03-29 PROCEDURE — 84484 ASSAY OF TROPONIN QUANT: CPT

## 2024-03-29 PROCEDURE — 83880 ASSAY OF NATRIURETIC PEPTIDE: CPT

## 2024-03-29 PROCEDURE — 6360000002 HC RX W HCPCS: Performed by: INTERNAL MEDICINE

## 2024-03-29 PROCEDURE — 36600 WITHDRAWAL OF ARTERIAL BLOOD: CPT

## 2024-03-29 PROCEDURE — 6370000000 HC RX 637 (ALT 250 FOR IP): Performed by: INTERNAL MEDICINE

## 2024-03-29 PROCEDURE — 82435 ASSAY OF BLOOD CHLORIDE: CPT

## 2024-03-29 PROCEDURE — 84145 PROCALCITONIN (PCT): CPT

## 2024-03-29 PROCEDURE — G9687 HOSPICE ANYTIME MSMT PER: HCPCS | Performed by: INTERNAL MEDICINE

## 2024-03-29 PROCEDURE — 87635 SARS-COV-2 COVID-19 AMP PRB: CPT

## 2024-03-29 PROCEDURE — 87502 INFLUENZA DNA AMP PROBE: CPT

## 2024-03-29 PROCEDURE — 2580000003 HC RX 258

## 2024-03-29 PROCEDURE — 96375 TX/PRO/DX INJ NEW DRUG ADDON: CPT

## 2024-03-29 PROCEDURE — 6360000002 HC RX W HCPCS

## 2024-03-29 PROCEDURE — 80053 COMPREHEN METABOLIC PANEL: CPT

## 2024-03-29 PROCEDURE — 83605 ASSAY OF LACTIC ACID: CPT

## 2024-03-29 PROCEDURE — 82803 BLOOD GASES ANY COMBINATION: CPT

## 2024-03-29 PROCEDURE — 99222 1ST HOSP IP/OBS MODERATE 55: CPT | Performed by: NURSE PRACTITIONER

## 2024-03-29 PROCEDURE — 2580000003 HC RX 258: Performed by: INTERNAL MEDICINE

## 2024-03-29 PROCEDURE — 2060000000 HC ICU INTERMEDIATE R&B

## 2024-03-29 PROCEDURE — 93005 ELECTROCARDIOGRAM TRACING: CPT

## 2024-03-29 PROCEDURE — 85025 COMPLETE CBC W/AUTO DIFF WBC: CPT

## 2024-03-29 PROCEDURE — 85014 HEMATOCRIT: CPT

## 2024-03-29 PROCEDURE — 84295 ASSAY OF SERUM SODIUM: CPT

## 2024-03-29 PROCEDURE — 99309 SBSQ NF CARE MODERATE MDM 30: CPT | Performed by: INTERNAL MEDICINE

## 2024-03-29 PROCEDURE — 84132 ASSAY OF SERUM POTASSIUM: CPT

## 2024-03-29 PROCEDURE — G8484 FLU IMMUNIZE NO ADMIN: HCPCS | Performed by: INTERNAL MEDICINE

## 2024-03-29 PROCEDURE — 2500000003 HC RX 250 WO HCPCS: Performed by: NURSE PRACTITIONER

## 2024-03-29 PROCEDURE — 82565 ASSAY OF CREATININE: CPT

## 2024-03-29 PROCEDURE — 82330 ASSAY OF CALCIUM: CPT

## 2024-03-29 PROCEDURE — 83735 ASSAY OF MAGNESIUM: CPT

## 2024-03-29 PROCEDURE — 85730 THROMBOPLASTIN TIME PARTIAL: CPT

## 2024-03-29 PROCEDURE — 96374 THER/PROPH/DIAG INJ IV PUSH: CPT

## 2024-03-29 RX ORDER — INSULIN LISPRO 100 [IU]/ML
0-4 INJECTION, SOLUTION INTRAVENOUS; SUBCUTANEOUS NIGHTLY
Status: DISCONTINUED | OUTPATIENT
Start: 2024-03-29 | End: 2024-04-01

## 2024-03-29 RX ORDER — ALBUMIN (HUMAN) 12.5 G/50ML
25 SOLUTION INTRAVENOUS DAILY PRN
Status: DISCONTINUED | OUTPATIENT
Start: 2024-03-29 | End: 2024-04-12 | Stop reason: HOSPADM

## 2024-03-29 RX ORDER — GLUCAGON 1 MG/ML
1 KIT INJECTION PRN
Status: DISCONTINUED | OUTPATIENT
Start: 2024-03-29 | End: 2024-04-12 | Stop reason: HOSPADM

## 2024-03-29 RX ORDER — INSULIN LISPRO 100 [IU]/ML
0-8 INJECTION, SOLUTION INTRAVENOUS; SUBCUTANEOUS
Status: DISCONTINUED | OUTPATIENT
Start: 2024-03-29 | End: 2024-04-01

## 2024-03-29 RX ORDER — SODIUM CHLORIDE 0.9 % (FLUSH) 0.9 %
5-40 SYRINGE (ML) INJECTION EVERY 12 HOURS SCHEDULED
Status: DISCONTINUED | OUTPATIENT
Start: 2024-03-29 | End: 2024-04-12 | Stop reason: HOSPADM

## 2024-03-29 RX ORDER — ACETAMINOPHEN 325 MG/1
650 TABLET ORAL EVERY 6 HOURS PRN
Status: DISCONTINUED | OUTPATIENT
Start: 2024-03-29 | End: 2024-04-12 | Stop reason: HOSPADM

## 2024-03-29 RX ORDER — DEXTROSE MONOHYDRATE 100 MG/ML
INJECTION, SOLUTION INTRAVENOUS CONTINUOUS PRN
Status: DISCONTINUED | OUTPATIENT
Start: 2024-03-29 | End: 2024-04-12 | Stop reason: HOSPADM

## 2024-03-29 RX ORDER — ONDANSETRON 4 MG/1
4 TABLET, ORALLY DISINTEGRATING ORAL EVERY 8 HOURS PRN
Status: DISCONTINUED | OUTPATIENT
Start: 2024-03-29 | End: 2024-04-12 | Stop reason: HOSPADM

## 2024-03-29 RX ORDER — ALBUTEROL SULFATE 2.5 MG/3ML
2.5 SOLUTION RESPIRATORY (INHALATION) EVERY 4 HOURS PRN
Status: DISCONTINUED | OUTPATIENT
Start: 2024-03-29 | End: 2024-03-29

## 2024-03-29 RX ORDER — SODIUM CHLORIDE 9 MG/ML
INJECTION, SOLUTION INTRAVENOUS PRN
Status: DISCONTINUED | OUTPATIENT
Start: 2024-03-29 | End: 2024-04-12 | Stop reason: HOSPADM

## 2024-03-29 RX ORDER — HEPARIN SODIUM 5000 [USP'U]/ML
5000 INJECTION, SOLUTION INTRAVENOUS; SUBCUTANEOUS EVERY 8 HOURS SCHEDULED
Status: DISCONTINUED | OUTPATIENT
Start: 2024-03-29 | End: 2024-03-29

## 2024-03-29 RX ORDER — MAGNESIUM SULFATE IN WATER 40 MG/ML
2000 INJECTION, SOLUTION INTRAVENOUS ONCE
Status: COMPLETED | OUTPATIENT
Start: 2024-03-29 | End: 2024-03-29

## 2024-03-29 RX ORDER — ALBUTEROL SULFATE 2.5 MG/3ML
2.5 SOLUTION RESPIRATORY (INHALATION) PRN
Status: DISCONTINUED | OUTPATIENT
Start: 2024-03-29 | End: 2024-03-29 | Stop reason: SDUPTHER

## 2024-03-29 RX ORDER — ALBUTEROL SULFATE 2.5 MG/3ML
2.5 SOLUTION RESPIRATORY (INHALATION)
Status: DISCONTINUED | OUTPATIENT
Start: 2024-03-29 | End: 2024-03-29

## 2024-03-29 RX ORDER — ATORVASTATIN CALCIUM 40 MG/1
40 TABLET, FILM COATED ORAL NIGHTLY
Status: DISCONTINUED | OUTPATIENT
Start: 2024-03-29 | End: 2024-04-12 | Stop reason: HOSPADM

## 2024-03-29 RX ORDER — ASPIRIN 81 MG/1
81 TABLET ORAL DAILY
Status: DISCONTINUED | OUTPATIENT
Start: 2024-03-29 | End: 2024-04-12 | Stop reason: HOSPADM

## 2024-03-29 RX ORDER — MIDODRINE HYDROCHLORIDE 10 MG/1
20 TABLET ORAL
Status: DISCONTINUED | OUTPATIENT
Start: 2024-03-29 | End: 2024-03-30

## 2024-03-29 RX ORDER — ACETAMINOPHEN 650 MG/1
650 SUPPOSITORY RECTAL EVERY 6 HOURS PRN
Status: DISCONTINUED | OUTPATIENT
Start: 2024-03-29 | End: 2024-04-12 | Stop reason: HOSPADM

## 2024-03-29 RX ORDER — SODIUM CHLORIDE 0.9 % (FLUSH) 0.9 %
5-40 SYRINGE (ML) INJECTION PRN
Status: DISCONTINUED | OUTPATIENT
Start: 2024-03-29 | End: 2024-04-12 | Stop reason: HOSPADM

## 2024-03-29 RX ORDER — 0.9 % SODIUM CHLORIDE 0.9 %
500 INTRAVENOUS SOLUTION INTRAVENOUS ONCE
Status: COMPLETED | OUTPATIENT
Start: 2024-03-29 | End: 2024-03-29

## 2024-03-29 RX ORDER — METHYLPREDNISOLONE SODIUM SUCCINATE 125 MG/2ML
125 INJECTION, POWDER, LYOPHILIZED, FOR SOLUTION INTRAMUSCULAR; INTRAVENOUS ONCE
Status: COMPLETED | OUTPATIENT
Start: 2024-03-29 | End: 2024-03-29

## 2024-03-29 RX ORDER — SEVELAMER CARBONATE 800 MG/1
800 TABLET, FILM COATED ORAL 3 TIMES DAILY
Status: DISCONTINUED | OUTPATIENT
Start: 2024-03-29 | End: 2024-04-12 | Stop reason: HOSPADM

## 2024-03-29 RX ORDER — ALBUTEROL SULFATE 2.5 MG/3ML
2.5 SOLUTION RESPIRATORY (INHALATION)
Status: DISCONTINUED | OUTPATIENT
Start: 2024-03-30 | End: 2024-04-12 | Stop reason: HOSPADM

## 2024-03-29 RX ORDER — ALBUTEROL SULFATE 2.5 MG/3ML
2.5 SOLUTION RESPIRATORY (INHALATION) EVERY 4 HOURS PRN
Status: DISCONTINUED | OUTPATIENT
Start: 2024-03-29 | End: 2024-04-12 | Stop reason: HOSPADM

## 2024-03-29 RX ORDER — POLYETHYLENE GLYCOL 3350 17 G/17G
17 POWDER, FOR SOLUTION ORAL DAILY PRN
Status: DISCONTINUED | OUTPATIENT
Start: 2024-03-29 | End: 2024-04-08

## 2024-03-29 RX ORDER — ONDANSETRON 2 MG/ML
4 INJECTION INTRAMUSCULAR; INTRAVENOUS EVERY 6 HOURS PRN
Status: DISCONTINUED | OUTPATIENT
Start: 2024-03-29 | End: 2024-04-12 | Stop reason: HOSPADM

## 2024-03-29 RX ORDER — ALBUMIN (HUMAN) 12.5 G/50ML
25 SOLUTION INTRAVENOUS ONCE
Status: COMPLETED | OUTPATIENT
Start: 2024-03-29 | End: 2024-03-29

## 2024-03-29 RX ORDER — ARIPIPRAZOLE 5 MG/1
5 TABLET ORAL DAILY
Status: DISCONTINUED | OUTPATIENT
Start: 2024-03-29 | End: 2024-04-12 | Stop reason: HOSPADM

## 2024-03-29 RX ORDER — PANTOPRAZOLE SODIUM 20 MG/1
20 TABLET, DELAYED RELEASE ORAL DAILY
Status: DISCONTINUED | OUTPATIENT
Start: 2024-03-29 | End: 2024-04-12 | Stop reason: HOSPADM

## 2024-03-29 RX ADMIN — METHYLPREDNISOLONE SODIUM SUCCINATE 125 MG: 125 INJECTION INTRAMUSCULAR; INTRAVENOUS at 11:37

## 2024-03-29 RX ADMIN — SODIUM CHLORIDE, PRESERVATIVE FREE 10 ML: 5 INJECTION INTRAVENOUS at 21:24

## 2024-03-29 RX ADMIN — PANTOPRAZOLE SODIUM 20 MG: 20 TABLET, DELAYED RELEASE ORAL at 16:25

## 2024-03-29 RX ADMIN — MAGNESIUM SULFATE HEPTAHYDRATE 2000 MG: 40 INJECTION, SOLUTION INTRAVENOUS at 11:37

## 2024-03-29 RX ADMIN — ARIPIPRAZOLE 5 MG: 5 TABLET ORAL at 16:24

## 2024-03-29 RX ADMIN — APIXABAN 2.5 MG: 2.5 TABLET, FILM COATED ORAL at 21:24

## 2024-03-29 RX ADMIN — ALBUTEROL SULFATE 2.5 MG: 2.5 SOLUTION RESPIRATORY (INHALATION) at 19:42

## 2024-03-29 RX ADMIN — ASPIRIN 81 MG: 81 TABLET, COATED ORAL at 16:24

## 2024-03-29 RX ADMIN — ALBUTEROL SULFATE 2.5 MG: 2.5 SOLUTION RESPIRATORY (INHALATION) at 11:18

## 2024-03-29 RX ADMIN — ATORVASTATIN CALCIUM 40 MG: 40 TABLET, FILM COATED ORAL at 21:24

## 2024-03-29 RX ADMIN — ALBUMIN (HUMAN) 25 G: 0.25 INJECTION, SOLUTION INTRAVENOUS at 13:40

## 2024-03-29 RX ADMIN — SEVELAMER CARBONATE 800 MG: 800 TABLET, FILM COATED ORAL at 16:24

## 2024-03-29 RX ADMIN — MIDODRINE HYDROCHLORIDE 15 MG: 10 TABLET ORAL at 14:11

## 2024-03-29 RX ADMIN — SEVELAMER CARBONATE 800 MG: 800 TABLET, FILM COATED ORAL at 21:24

## 2024-03-29 RX ADMIN — MIDODRINE HYDROCHLORIDE 20 MG: 10 TABLET ORAL at 15:28

## 2024-03-29 RX ADMIN — MICONAZOLE NITRATE: 2 POWDER TOPICAL at 23:20

## 2024-03-29 RX ADMIN — SODIUM CHLORIDE 500 ML: 9 INJECTION, SOLUTION INTRAVENOUS at 14:23

## 2024-03-29 RX ADMIN — SERTRALINE HYDROCHLORIDE 50 MG: 50 TABLET ORAL at 21:24

## 2024-03-29 ASSESSMENT — PAIN - FUNCTIONAL ASSESSMENT
PAIN_FUNCTIONAL_ASSESSMENT: NONE - DENIES PAIN

## 2024-03-29 NOTE — H&P
Patient is [age]-year-old woman with past medical history of coronary disease status post stent, end-stage renal disease on dialysis Tuesday Thursday Saturday.  Last dialysis was yesterday.  Comes in here for fluid overload feeling shortness of breath.  Patient is on midodrine 15 mg p.o. 3 times daily.  Patient been only taking it twice a day.  Patient does not use oxygen here but is on oxygen therapy to keep oxygen saturation above 88%.  Patient denies any chest pain.  Denies any cough, denies any nausea vomiting or abdominal pain.    Past Medical History:   Diagnosis Date    Angina at rest 5/24/2020    Anxiety     CAD S/P percutaneous coronary angioplasty 2015, 2018    stents per Huong Sanabria    CHF (congestive heart failure) ([facility])     CKD (chronic kidney disease) stage 4, GFR 15-29 ml/min ([facility]) 2/24/2018    CKD stage 4 due to type 2 diabetes mellitus ([facility])     Contusion of right chest wall 2/16/2021    COPD (chronic obstructive pulmonary disease) ([facility])     Diabetic nephropathy with proteinuria ([facility]) 2014    DJD (degenerative joint disease) of knee     Dr Sharma    GERD (gastroesophageal reflux disease)     Hemiparesis, left ([facility]) 2013    entered Assisted Living ([place])    Hemodialysis patient ([facility])     Hemodialysis-associated hypotension 10/22/2021    History of heart failure     History of seizures     History of type C viral hepatitis     HTN (hypertension)     Hyperlipidemia     Impaired mobility and activities of daily living     Infection of venous access port 7/29/2022    Mediastinal lymphadenopathy 2013    Sangeeta Cummings    Metabolic syndrome     Moderate persistent asthma without complication 9/23/2020    Need for extended care facility 7/7/2021    Neurogenic urinary incontinence 2013    Neuropathy in diabetes ([facility])     Nonrheumatic mitral valve regurgitation 7/7/2021    Nonrheumatic tricuspid valve regurgitation 7/7/2021    Obesity (BMI 30-39.9)     Recurrent UTI     S/P

## 2024-03-29 NOTE — ED TRIAGE NOTES
Pt arrived via squad c/o SOB, possibly due to Fluid overload.She has a Dialysis Fistula in her right arm Tues, Thurs, Sat.

## 2024-03-29 NOTE — PLAN OF CARE
Problem: Discharge Planning  Goal: Discharge to home or other facility with appropriate resources  Outcome: Progressing  Flowsheets (Taken 3/29/2024 1500)  Discharge to home or other facility with appropriate resources: Identify barriers to discharge with patient and caregiver     Problem: Safety - Adult  Goal: Free from fall injury  Outcome: Progressing

## 2024-03-29 NOTE — PROGRESS NOTES
03/29/24 1544   RT Protocol   History Pulmonary Disease 2   Respiratory pattern 0   Breath sounds 6   Cough 0   Indications for Bronchodilator Therapy Wheezing associated with pulm disorder   Bronchodilator Assessment Score 8

## 2024-03-29 NOTE — CARE COORDINATION
Readmission Assessment  Number of Days since last admission?: 8-30 days  Previous Disposition: SNF  Who is being Interviewed: Patient  What was the patient's/caregiver's perception as to why they think they needed to return back to the hospital?: Other (Comment) (states that she \"got short of breath\")  Did you visit your Primary Care Physician after you left the hospital, before you returned this time?: Yes ([facility] NP)  Did you see a specialist, such as Cardiac, Pulmonary, Orthopedic Physician, etc. after you left the hospital?: No  Who advised the patient to return to the hospital?: Self-referral  Does the patient report anything that got in the way of taking their medications?:  (n/a: snf)  In our efforts to provide the best possible care to you and others like you, can you think of anything that we could have done to help you after you left the hospital the first time, so that you might not have needed to return so soon?:  (with response to these questions, the patient stated \"I can't think of anything else\" with regard to d/c planning, pt teaching and preparation. She was d/c to [facility].)  Case Management Assessment  Initial Evaluation    Date/Time of Evaluation: 3/29/2024 2:48 PM  Assessment Completed by: Opal Small RN    If patient is discharged prior to next notation, then this note serves as note for discharge by case management.    Patient Name: Michelle Le                   YOB: 1960  Diagnosis: ESRD on dialysis (HCC) [N18.6, Z99.2]  Fluid overload [E87.70]  Acute decompensated heart failure (HCC) [I50.9]  Hypotension, unspecified hypotension type [I95.9]                   Date / Time: 3/29/2024 10:23 AM    Patient Admission Status: Inpatient   Readmission Risk (Low < 19, Mod (19-27), High > 27): Readmission Risk Score: 22.5    Current PCP: Adilene Terry MD  PCP verified by CM? Yes    Chart Reviewed: Yes      History Provided by: Patient  Patient Orientation: Alert and Oriented,  Person, Place, Situation, Self    Patient Cognition: Alert    Hospitalization in the last 30 days (Readmission):  Yes    If yes, Readmission Assessment in  Navigator will be completed.    Advance Directives:      Code Status: Full Code   Patient's Primary Decision Maker is: Named in Scanned ACP Document    Primary Decision Maker: Angelina Fagan - [phone]    Secondary Decision Maker: One,No - Other - [phone]    Discharge Planning:    Patient lives with: (P) Other (Comment) (snf currently.) Type of Home: (P) Skilled Nursing Facility  Primary Care Giver: Self  Patient Support Systems include: Children   Current Financial resources: Medicare, Medicaid  Current community resources: ECF/Home Care  Current services prior to admission: (P) Skilled Nursing Facility            Current DME:              Type of Home Care services:       ADLS  Prior functional level: Assistance with the following:, Bathing, Dressing, Cooking, Housework, Shopping, Mobility  Current functional level: Assistance with the following:, Bathing, Dressing, Cooking, Housework, Shopping, Mobility, Other (see comment) (supervision with ambulation.)    PT AM-PAC:   /24  OT AM-PAC:   /24    Family can provide assistance at DC: Other (comment) (currently is at [facility] but her daughter is supportive.)  Would you like Case Management to discuss the discharge plan with any other family members/significant others, and if so, who? Yes (daughter Angelina.)  Plans to Return to Present Housing: Yes  Other Identified Issues/Barriers to RETURNING to current housing: none  Potential Assistance needed at discharge: (P) Other (Comment) (pt states she would return to [facility] after d/c.)            Potential DME:    Patient expects to discharge to: (P) Skilled nursing facility  Plan for transportation at discharge:      Financial    Payor: MEDICARE / Plan: MEDICARE PART A AND B / Product Type: *No Product type* /     Does insurance require precert for SNF:

## 2024-03-29 NOTE — FLOWSHEET NOTE
03/29/24 1455   Vital Signs   Temp 98.1 °F (36.7 °C)   Temp Source Oral   Pulse 51   Heart Rate Source Monitor   Respirations 20   BP (!) 87/48   MAP (Calculated) 61   MAP (mmHg) (!) 58   BP Location Right upper arm   BP Method Automatic   Patient Position Sitting   Pain Assessment   Pain Assessment None - Denies Pain   Care Plan - Pain Goals   Verbalizes/displays adequate comfort level or baseline comfort level Encourage patient to monitor pain and request assistance   Opioid-Induced Sedation   POSS Score 1   RASS   Peña Agitation Sedation Scale (RASS) 0   Oxygen Therapy   SpO2 98 %   Pulse Oximetry Type Intermittent   Pulse Oximeter Device Mode Intermittent   Pulse Oximeter Device Location Finger   O2 Device Nasal cannula   O2 Flow Rate (L/min) 5.5 L/min   Height and Weight   Height 1.702 m (5' 7\")   Weight - Scale 72.3 kg (159 lb 6.3 oz)   Weight Method Actual;Bed scale   BSA (Calculated - sq m) 1.85 sq meters   BMI (Calculated) 25       Pt admitted to [facility]. Dr. Cintron aware of pressures, no further orders. Med rec complete, Dr. Cintron made aware. Skin check complete with Shyam RN. Skin tears to bilateral upper arms and bruising noted to sacrum.

## 2024-03-29 NOTE — CONSULTS
Palliative Care Consult Note  Patient: Michelle Le  Gender: female  YOB: 1960  Unit/Bed: W168/W168-01  CodeStatus: Full Code  Inpatient Treatment Team: Treatment Team: Attending Provider: Adali Cintron MD; Consulting Physician: Jinny Suarez, LORETO - CNP; Registered Nurse: Jodie Carter RN  Admit Date:  3/29/2024    Chief Complaint: Shortness of breath    History of Presenting Illness:      Michelle Le is a 64 y.o. female on hospital day 0 with a history of ESRD dialysis TTHS, HTN, afib, HLD, orhtostatic hypotension, DM, Depression, anxiety.    Patient was brought to the emergency room with fluid overload and SOB. Patient was admitted for fluid overload. Patient recently hospitalized 3/5-3/9 for generalized weakness and falls. She was living with her daughter before that admission but had been discharged to [facility] for rehab.     Palliative care was consulted for end stage disease.      Patient normally lives at home with daughter.  However has been at [facility] for rehab since previous admission.  Patient reports she is up with a walker.  Patient denies any weight loss, appetite changes, nausea.    Upon entering room patient is laying in bed she is alert and oriented x 4.  Patient denies any pain, nausea, fever, chills, fatigue.  Patient has complaints of shortness of breath.  Patient is on 5 L nasal cannula.  Patient reports she normally does not wear oxygen.  Patient had dialysis yesterday.  Per nurse patient to have dialysis again tomorrow.    I discussed goals of care and CODE STATUS with patient at bedside.  Patient has power of  who is her daughter.  CODE STATUS is discussed in depth.  Patient wants to remain a full code at this time.  Discussed patient could benefit from palliative care in the outpatient setting.    Most recent notable labs: K 5.0, GFR 7, Creat 6.5, Troponin 130,       Review of Systems:       Review of Systems   Constitutional:  Negative for activity

## 2024-03-29 NOTE — RT PROTOCOL NOTE
same Frequency and PRN reasons based on the score.  If a patient is on this medication at home then do not decrease Frequency below that used at home.    0-3 - enter or revise RT bronchodilator order(s) to equivalent RT Bronchodilator order with Frequency of every 4 hours PRN for wheezing or increased work of breathing using Per Protocol order mode.        4-6 - enter or revise RT Bronchodilator order(s) to two equivalent RT bronchodilator orders with one order with BID Frequency and one order with Frequency of every 4 hours PRN wheezing or increased work of breathing using Per Protocol order mode.        7-10 - enter or revise RT Bronchodilator order(s) to two equivalent RT bronchodilator orders with one order with TID Frequency and one order with Frequency of every 4 hours PRN wheezing or increased work of breathing using Per Protocol order mode.       11-13 - enter or revise RT Bronchodilator order(s) to one equivalent RT bronchodilator order with QID Frequency and an Albuterol order with Frequency of every 4 hours PRN wheezing or increased work of breathing using Per Protocol order mode.      Greater than 13 - enter or revise RT Bronchodilator order(s) to one equivalent RT bronchodilator order with every 4 hours Frequency and an Albuterol order with Frequency of every 2 hours PRN wheezing or increased work of breathing using Per Protocol order mode.     RT to enter RT Home Evaluation for COPD & MDI Assessment order using Per Protocol order mode.    Electronically signed by Sienna De La O RCP on 3/29/2024 at 3:44 PM

## 2024-03-29 NOTE — ED NOTES
IV 25 g of Albumin 25% running to gravity per PA orders. Will run 0.9 %  ml after completion of Albumin. Will continue to monitor.

## 2024-03-29 NOTE — ED PROVIDER NOTES
[facility] TELEMETRY  EMERGENCY DEPARTMENT ENCOUNTER      Pt Name: Michelle Le  MRN: [id number]  Birthdate 1960  Date of evaluation: 3/29/2024  Provider: CITLALLI Lambert  10:53 AM EDT    CHIEF COMPLAINT       Chief Complaint   Patient presents with   • Shortness of Breath     Sob poss. Due to fluid overload         HISTORY OF PRESENT ILLNESS   (Location/Symptom, Timing/Onset, Context/Setting, Quality, Duration, Modifying Factors, Severity)  Note limiting factors.   Michelle Le is a 64 y.o. female whom per chart review has a PMHx of schizophrenia, hypertension, neurogenic urinary incontinence, vitamin D deficiency, CAD s/p PCI, hyperlipidemia, GERD, obesity, neuropathy, COPD, type II DM, anxiety, DJD, ESRD on dialysis (Tuesday, Thursday, Saturday), CHF (LVEF 55 to 60%, 03/05/2024), [place], vitamin D deficiency, NSTEMI presents to ED via EMS from [facility] for evaluation of shortness of breath.  Patient reports that she has become increasingly short of breath over the last week.  Patient reports that the NH is concerned that she may be volume overloaded.  Patient reports that she has been compliant with her dialysis schedule, however reports that secondary to hypotension they are unable to remove large volumes of fluid.  Patient verbalizes no additional complaints.  Denies ill contacts, exposures.  Patient denies chest pain, N/V/D, fever, chills.    HPI    Nursing Notes were reviewed.    REVIEW OF SYSTEMS    (2-9 systems for level 4, 10 or more for level 5)     Review of Systems   Respiratory:  Positive for shortness of breath.    Cardiovascular:  Positive for leg swelling.   Skin:  Positive for color change.   All other systems reviewed and are negative.      Except as noted above the remainder of the review of systems was reviewed and negative.       PAST MEDICAL HISTORY     Past Medical History:   Diagnosis Date   • Angina at rest 5/24/2020   • Anxiety    • CAD S/P percutaneous coronary angioplasty 2015,  for cardiomegaly. No additional acute cardiopulmonary pathology noted. COVID-19, Influenza A/B negative. Labs remarkable for K+ 5.7 (hemolyzed - redraw 5.0), creatinine 5.15, GFR 8.7, BUN 24, procalcitonin 0.27, lactic acid 2.9, troponin 129, BNP 98584. Patient reassessed; updated on results, findings, plan. Patient is clinically volume overloaded and would benefit from diuresis. BP noted to be 85/43 during reevaluation. Patient does have a PMHx of hypotension and is on midodrine, however given volume overload in the setting of hypotension, diuresis in ED held. Patient given 500 mL IV NS, IV albumin at this time given hypotension. Discussed admission with patient. Patient is agreeable to admission given CHF, hypotension. Call placed to medicine. Spoke with Dr. Cintron (medicine) whom is agreeable to admission. Patient admitted to medicine in stable condition.     Problems Addressed:  Acute decompensated heart failure (HCC): acute illness or injury  ESRD on dialysis (HCC): acute illness or injury  Hypotension, unspecified hypotension type: acute illness or injury    Amount and/or Complexity of Data Reviewed  Labs: ordered.  Radiology: ordered.  ECG/medicine tests: ordered.    Risk  Prescription drug management.  Decision regarding hospitalization.      REASSESSMENT      CRITICAL CARE TIME   Total Critical Care time was 0 minutes, excluding separately reportable procedures.  There was a high probability of clinically significant/life threatening deterioration in the patient's condition which required my urgent intervention.      CONSULTS:  IP CONSULT TO NEPHROLOGY  IP CONSULT TO PALLIATIVE CARE  IP CONSULT TO CARDIOLOGY    PROCEDURES:  Unless otherwise noted below, none     Procedures    No LOS Charge filed     FINAL IMPRESSION      1. Acute decompensated heart failure (HCC)    2. Hypotension, unspecified hypotension type    3. ESRD on dialysis (HCC)          DISPOSITION/PLAN   DISPOSITION Admitted 03/29/2024 01:28:51

## 2024-03-30 LAB
ANION GAP SERPL CALCULATED.3IONS-SCNC: 20 MEQ/L (ref 9–15)
BASOPHILS # BLD: 0 K/UL (ref 0–0.2)
BASOPHILS NFR BLD: 0.1 %
BUN SERPL-MCNC: 38 MG/DL (ref 8–23)
CALCIUM SERPL-MCNC: 8.8 MG/DL (ref 8.5–9.9)
CHLORIDE SERPL-SCNC: 90 MEQ/L (ref 95–107)
CO2 SERPL-SCNC: 23 MEQ/L (ref 20–31)
CREAT SERPL-MCNC: 6.11 MG/DL (ref 0.5–0.9)
EOSINOPHIL # BLD: 0 K/UL (ref 0–0.7)
EOSINOPHIL NFR BLD: 0 %
ERYTHROCYTE [DISTWIDTH] IN BLOOD BY AUTOMATED COUNT: 17 % (ref 11.5–14.5)
GLUCOSE BLD-MCNC: 203 MG/DL (ref 70–99)
GLUCOSE BLD-MCNC: 213 MG/DL (ref 70–99)
GLUCOSE BLD-MCNC: 235 MG/DL (ref 70–99)
GLUCOSE BLD-MCNC: 238 MG/DL (ref 70–99)
GLUCOSE SERPL-MCNC: 239 MG/DL (ref 70–99)
HCT VFR BLD AUTO: 40.7 % (ref 37–47)
HGB BLD-MCNC: 13.1 G/DL (ref 12–16)
LACTIC ACID, SEPSIS: 5 MMOL/L (ref 0.5–1.9)
LACTIC ACID, SEPSIS: 5.3 MMOL/L (ref 0.5–1.9)
LACTIC ACID, SEPSIS: 6.4 MMOL/L (ref 0.5–1.9)
LYMPHOCYTES # BLD: 1 K/UL (ref 1–4.8)
LYMPHOCYTES NFR BLD: 10.3 %
MCH RBC QN AUTO: 33 PG (ref 27–31.3)
MCHC RBC AUTO-ENTMCNC: 32.2 % (ref 33–37)
MCV RBC AUTO: 102.5 FL (ref 79.4–94.8)
MONOCYTES # BLD: 0.6 K/UL (ref 0.2–0.8)
MONOCYTES NFR BLD: 6.2 %
NEUTROPHILS # BLD: 7.9 K/UL (ref 1.4–6.5)
NEUTS SEG NFR BLD: 83.1 %
PERFORMED ON: ABNORMAL
PLATELET # BLD AUTO: 176 K/UL (ref 130–400)
POTASSIUM SERPL-SCNC: 6.4 MEQ/L (ref 3.4–4.9)
RBC # BLD AUTO: 3.97 M/UL (ref 4.2–5.4)
SODIUM SERPL-SCNC: 133 MEQ/L (ref 135–144)
WBC # BLD AUTO: 9.5 K/UL (ref 4.8–10.8)

## 2024-03-30 PROCEDURE — 6370000000 HC RX 637 (ALT 250 FOR IP): Performed by: INTERNAL MEDICINE

## 2024-03-30 PROCEDURE — 2000000000 HC ICU R&B

## 2024-03-30 PROCEDURE — 2580000003 HC RX 258: Performed by: INTERNAL MEDICINE

## 2024-03-30 PROCEDURE — 83605 ASSAY OF LACTIC ACID: CPT

## 2024-03-30 PROCEDURE — 85025 COMPLETE CBC W/AUTO DIFF WBC: CPT

## 2024-03-30 PROCEDURE — 2700000000 HC OXYGEN THERAPY PER DAY

## 2024-03-30 PROCEDURE — 3E033XZ INTRODUCTION OF VASOPRESSOR INTO PERIPHERAL VEIN, PERCUTANEOUS APPROACH: ICD-10-PCS | Performed by: INTERNAL MEDICINE

## 2024-03-30 PROCEDURE — 99221 1ST HOSP IP/OBS SF/LOW 40: CPT | Performed by: INTERNAL MEDICINE

## 2024-03-30 PROCEDURE — 2500000003 HC RX 250 WO HCPCS

## 2024-03-30 PROCEDURE — 6360000002 HC RX W HCPCS: Performed by: INTERNAL MEDICINE

## 2024-03-30 PROCEDURE — 94761 N-INVAS EAR/PLS OXIMETRY MLT: CPT

## 2024-03-30 PROCEDURE — 36415 COLL VENOUS BLD VENIPUNCTURE: CPT

## 2024-03-30 PROCEDURE — P9047 ALBUMIN (HUMAN), 25%, 50ML: HCPCS | Performed by: INTERNAL MEDICINE

## 2024-03-30 PROCEDURE — 80048 BASIC METABOLIC PNL TOTAL CA: CPT

## 2024-03-30 PROCEDURE — 94640 AIRWAY INHALATION TREATMENT: CPT

## 2024-03-30 RX ORDER — MIDODRINE HYDROCHLORIDE 10 MG/1
20 TABLET ORAL 4 TIMES DAILY
Status: DISCONTINUED | OUTPATIENT
Start: 2024-03-30 | End: 2024-03-30 | Stop reason: DRUGHIGH

## 2024-03-30 RX ORDER — NOREPINEPHRINE BITARTRATE 0.06 MG/ML
1-100 INJECTION, SOLUTION INTRAVENOUS CONTINUOUS
Status: DISCONTINUED | OUTPATIENT
Start: 2024-03-30 | End: 2024-03-30 | Stop reason: SDUPTHER

## 2024-03-30 RX ORDER — NOREPINEPHRINE BITARTRATE 0.06 MG/ML
INJECTION, SOLUTION INTRAVENOUS
Status: COMPLETED
Start: 2024-03-30 | End: 2024-03-30

## 2024-03-30 RX ORDER — ALBUMIN (HUMAN) 12.5 G/50ML
25 SOLUTION INTRAVENOUS ONCE
Status: COMPLETED | OUTPATIENT
Start: 2024-03-30 | End: 2024-03-30

## 2024-03-30 RX ORDER — 0.9 % SODIUM CHLORIDE 0.9 %
500 INTRAVENOUS SOLUTION INTRAVENOUS ONCE
Status: COMPLETED | OUTPATIENT
Start: 2024-03-30 | End: 2024-03-30

## 2024-03-30 RX ORDER — NOREPINEPHRINE BITARTRATE 0.06 MG/ML
1-100 INJECTION, SOLUTION INTRAVENOUS CONTINUOUS
Status: DISCONTINUED | OUTPATIENT
Start: 2024-03-30 | End: 2024-04-12 | Stop reason: HOSPADM

## 2024-03-30 RX ORDER — MIDODRINE HYDROCHLORIDE 10 MG/1
10 TABLET ORAL
Status: DISCONTINUED | OUTPATIENT
Start: 2024-03-30 | End: 2024-04-01

## 2024-03-30 RX ADMIN — ALBUMIN (HUMAN) 25 G: 0.25 INJECTION, SOLUTION INTRAVENOUS at 08:54

## 2024-03-30 RX ADMIN — ALBUMIN (HUMAN) 25 G: 0.25 INJECTION, SOLUTION INTRAVENOUS at 09:50

## 2024-03-30 RX ADMIN — SODIUM CHLORIDE 500 ML: 9 INJECTION, SOLUTION INTRAVENOUS at 18:00

## 2024-03-30 RX ADMIN — SEVELAMER CARBONATE 800 MG: 800 TABLET, FILM COATED ORAL at 08:18

## 2024-03-30 RX ADMIN — INSULIN LISPRO 2 UNITS: 100 INJECTION, SOLUTION INTRAVENOUS; SUBCUTANEOUS at 17:04

## 2024-03-30 RX ADMIN — ONDANSETRON 4 MG: 2 INJECTION INTRAMUSCULAR; INTRAVENOUS at 03:08

## 2024-03-30 RX ADMIN — INSULIN LISPRO 2 UNITS: 100 INJECTION, SOLUTION INTRAVENOUS; SUBCUTANEOUS at 08:18

## 2024-03-30 RX ADMIN — MIDODRINE HYDROCHLORIDE 20 MG: 10 TABLET ORAL at 08:18

## 2024-03-30 RX ADMIN — SODIUM CHLORIDE, PRESERVATIVE FREE 10 ML: 5 INJECTION INTRAVENOUS at 08:19

## 2024-03-30 RX ADMIN — MIDODRINE HYDROCHLORIDE 20 MG: 10 TABLET ORAL at 11:02

## 2024-03-30 RX ADMIN — MIDODRINE HYDROCHLORIDE 10 MG: 10 TABLET ORAL at 20:07

## 2024-03-30 RX ADMIN — ASPIRIN 81 MG: 81 TABLET, COATED ORAL at 08:18

## 2024-03-30 RX ADMIN — PANTOPRAZOLE SODIUM 20 MG: 20 TABLET, DELAYED RELEASE ORAL at 08:18

## 2024-03-30 RX ADMIN — ATORVASTATIN CALCIUM 40 MG: 40 TABLET, FILM COATED ORAL at 20:06

## 2024-03-30 RX ADMIN — Medication 5 MCG/MIN: at 19:21

## 2024-03-30 RX ADMIN — SEVELAMER CARBONATE 800 MG: 800 TABLET, FILM COATED ORAL at 14:51

## 2024-03-30 RX ADMIN — ALBUMIN (HUMAN) 25 G: 0.25 INJECTION, SOLUTION INTRAVENOUS at 21:17

## 2024-03-30 RX ADMIN — ALBUTEROL SULFATE 2.5 MG: 2.5 SOLUTION RESPIRATORY (INHALATION) at 07:19

## 2024-03-30 RX ADMIN — APIXABAN 2.5 MG: 2.5 TABLET, FILM COATED ORAL at 08:18

## 2024-03-30 RX ADMIN — SODIUM CHLORIDE: 9 INJECTION, SOLUTION INTRAVENOUS at 14:06

## 2024-03-30 RX ADMIN — SEVELAMER CARBONATE 800 MG: 800 TABLET, FILM COATED ORAL at 20:06

## 2024-03-30 RX ADMIN — SERTRALINE HYDROCHLORIDE 50 MG: 50 TABLET ORAL at 20:07

## 2024-03-30 RX ADMIN — APIXABAN 2.5 MG: 2.5 TABLET, FILM COATED ORAL at 20:12

## 2024-03-30 RX ADMIN — MICONAZOLE NITRATE: 2 POWDER TOPICAL at 08:19

## 2024-03-30 RX ADMIN — ARIPIPRAZOLE 5 MG: 5 TABLET ORAL at 08:18

## 2024-03-30 ASSESSMENT — PAIN SCALES - GENERAL: PAINLEVEL_OUTOF10: 0

## 2024-03-30 NOTE — PROGRESS NOTES
Patient transferred to ICU from 1W per Dr. Beltrán. Patient hypotensive on arrival, reports dizziness, shortness of breath and back pain.     Rapid response called, orders given per Dr. Mir, see MAR.

## 2024-03-30 NOTE — ACP (ADVANCE CARE PLANNING)
Advance Care Planning     Advance Care Planning Activator (Inpatient)  Conversation Note      Date of ACP Conversation: 3/29/2024     Conversation Conducted with: Patient with Decision Making Capacity    ACP Activator: Opal Small, RN        Health Care Decision Maker:     Current Designated Health Care Decision Maker:     Primary Decision Maker: Angelina Fagan - Child - [phone]

## 2024-03-30 NOTE — CONSULTS
Inpatient consult to Cardiology  Consult performed by: David Beltrán MD  Consult ordered by: Adali Cintron MD      Patient Name: Michelle Le  Admit Date: 3/29/2024 10:23 AM  MR #: [id number]  : 1960    Attending Physician: Adali Cintron MD  Reason for consult: Hypotension    History of Presenting Illness:      Michelle Le is a 64 y.o. female on hospital day 1 with a history of CAD prior PCI to the LAD, CABG LIMA to LAD and tricuspid valve repair complicated by tamponade and pericardial window, end-stage renal disease on hemodialysis, hypertension, hyperlipidemia, atrial fibrillation on Eliquis admitted to the hospital for shortness of breath. History Obtained From:  patient, electronic medical record    Patient hyperkalemic on admission 6.4  EKG atrial flutter 58 bpm  Troponins flat 130    History:      Past Medical History:   Diagnosis Date    Angina at rest 2020    Anxiety     CAD S/P percutaneous coronary angioplasty ,     stents per Huong Sanabria    CHF (congestive heart failure) ([facility])     CKD (chronic kidney disease) stage 4, GFR 15-29 ml/min ([facility]) 2018    CKD stage 4 due to type 2 diabetes mellitus ([facility])     Contusion of right chest wall 2021    COPD (chronic obstructive pulmonary disease) ([facility])     Diabetic nephropathy with proteinuria ([facility])     DJD (degenerative joint disease) of knee     Dr Sharma    GERD (gastroesophageal reflux disease)     Hemiparesis, left ([facility])     entered Assisted Living ([place])    Hemodialysis patient ([facility])     Hemodialysis-associated hypotension 10/22/2021    History of heart failure     History of seizures     History of type C viral hepatitis     HTN (hypertension)     Hyperlipidemia     Impaired mobility and activities of daily living     Infection of venous access port 2022    Mediastinal lymphadenopathy     Sangeeta Cummings    Metabolic syndrome     Moderate persistent asthma without  exchanged to 15.5F x 28 cm.      15.5 fr by 23 cm Symetrex dialysis catheter inserted by Dr White    IR TUNNELED CATHETER PLACEMENT GREATER THAN 5 YEARS  07/07/2022    IR TUNNELED CATHETER PLACEMENT GREATER THAN 5 YEARS 7/7/2022 [facility] PROCED[facility]    DC ARTHRP KNE CONDYLE&PLATU MEDIAL&LAT COMPARTMENTS Left 06/21/2018    LEFT KNEE TOTAL KNEE ARTHROPLASTY, SHAYNA, NERVE BLOCK performed by Milad Sharma MD at [facility] OR    PTCA      TOE AMPUTATION Right     TOTAL KNEE ARTHROPLASTY  05/19/2016    Dr Sharma    TUNNELED VENOUS CATHETER PLACEMENT Right 07/01/2020    tunneled HD catheter per Dr White     Family History  Family History   Problem Relation Age of Onset    Cancer Mother 68        survived    Breast Cancer Mother     Hypertension Father     Diabetes Sister     Mental Illness Sister     Colon Cancer Neg Hx      [] Unable to obtain due to ventilated and/ or neurologic status  Social History     Socioeconomic History    Marital status:      Spouse name: Not on file    Number of children: 2    Years of education: Not on file    Highest education level: Not on file   Occupational History    Occupation: disabled   Tobacco Use    Smoking status: Never     Passive exposure: Never    Smokeless tobacco: Never   Vaping Use    Vaping Use: Never used   Substance and Sexual Activity    Alcohol use: No     Alcohol/week: 0.0 standard drinks of alcohol    Drug use: No    Sexual activity: Not Currently   Other Topics Concern    Not on file   Social History Narrative    Disabled dt depression and low back pain, lives in [place]-[address]    Dtr Angelina is close by and is her paid caregiver via waiver services    HD via [employer], [facility], Sat.    Son is in the home and has CP and is total care-and is also cared for by a waiver by Paulina.    Pt was born in [place], one of 5-including a twin sister Mechelle Hunter, very ill in 2018, dec 2019    Moved to [place], , 2 children, one son

## 2024-03-30 NOTE — SIGNIFICANT EVENT
Rapid response called. Pt SBP in the 60's. Baseline 90's or so. She returned from HD to ICU. 1 liter was removed. Pt is on 2L/min O2, Saturation at 100%. LE edema noted. Pt has slight dizziness but mentation appropriate.     No cough, no fever, no abd pain. Has back pain, lumbar spine CT noted (negative on 3/5/24). No chest pain or heaviness.     Labs noted. Repeat BP 77 systolic before any intervention.     Will check lactate  Give 500cc bolus now.   Resume midodrine already ordered by primary   No symptoms to suggest sepsis or active infection    Khalif Mir, DO  [facility]

## 2024-03-30 NOTE — PROGRESS NOTES
Progress Note  Date:3/30/2024       Room:[facility]/W168-01  Patient Name:Michelle Le     YOB: 1960     Age:64 y.o.        Subjective    Subjective:  Symptoms:  She reports weakness.  No shortness of breath, malaise, cough, chest pain, headache, chest pressure, anorexia, diarrhea or anxiety.    Diet:  No nausea or vomiting.       Review of Systems   Respiratory:  Negative for cough and shortness of breath.    Cardiovascular:  Negative for chest pain.   Gastrointestinal:  Negative for anorexia, diarrhea, nausea and vomiting.   Neurological:  Positive for weakness.     Objective         Vitals Last 24 Hours:  TEMPERATURE:  Temp  Av.7 °F (36.5 °C)  Min: 97.3 °F (36.3 °C)  Max: 98.4 °F (36.9 °C)  RESPIRATIONS RANGE: Resp  Av.9  Min: 10  Max: 24  PULSE OXIMETRY RANGE: SpO2  Av.9 %  Min: 98 %  Max: 100 %  PULSE RANGE: Pulse  Av.2  Min: 51  Max: 96  BLOOD PRESSURE RANGE: Systolic (24hrs), Av , Min:85 , Max:119   ; Diastolic (24hrs), Av, Min:37, Max:79    I/O (24Hr):    Intake/Output Summary (Last 24 hours) at 3/30/2024 1125  Last data filed at 3/30/2024 0819  Gross per 24 hour   Intake 1139.27 ml   Output --   Net 1139.27 ml     Objective:  General Appearance:  In no acute distress.    Vital signs: (most recent): Blood pressure (!) 89/44, pulse 93, temperature 97.5 °F (36.4 °C), resp. rate 18, height 1.702 m (5' 7\"), weight 72.3 kg (159 lb 6.3 oz), SpO2 100 %, not currently breastfeeding.    HEENT: Normal HEENT exam.    Lungs:  There are decreased breath sounds.    Heart: S1 normal and S2 normal.  No gallop.   Abdomen: Abdomen is soft.  Bowel sounds are normal.   There is no epigastric area or suprapubic area tenderness.     Extremities: There is no deformity.    Neurological: Patient is alert and oriented to person, place and time.    Pupils:  Pupils are equal, round, and reactive to light.    Skin:  Warm.      Labs/Imaging/Diagnostics    Labs:  CBC:  Recent Labs

## 2024-03-30 NOTE — CONSULTS
tablet by mouth daily (Patient taking differently: Take 1 tablet by mouth daily Indications: Thickening and Hardening of Heart Arteries) 30 tablet 12    blood glucose test strips (FREESTYLE LITE) strip 1 each by Does not apply route 4 times daily (before meals and nightly) As needed. 200 strip 5    acetaminophen (TYLENOL) 325 MG tablet Take 2 tablets by mouth every 4 hours as needed for Pain (For mild to moderate pain (Pain 1-6 out of 10 on pain scale)) 120 tablet 0    Blood Glucose Monitoring Suppl (FREESTYLE LITE) CORETTA 1 Device by Does not apply route daily as needed (Diabetes) Use freestyle meter to test blood sugar as needed 1 Device 0       Allergies:  Codeine and Oxycontin [oxycodone hcl]    Social History:    Social History     Socioeconomic History    Marital status:      Spouse name: Not on file    Number of children: 2    Years of education: Not on file    Highest education level: Not on file   Occupational History    Occupation: disabled   Tobacco Use    Smoking status: Never     Passive exposure: Never    Smokeless tobacco: Never   Vaping Use    Vaping Use: Never used   Substance and Sexual Activity    Alcohol use: No     Alcohol/week: 0.0 standard drinks of alcohol    Drug use: No    Sexual activity: Not Currently   Other Topics Concern    Not on file   Social History Narrative    Disabled dt depression and low back pain, lives in [place]-[address]    Dtr Angelina is close by and is her paid caregiver via waiver services    HD via [employer], Tues, Thur, Sat.    Son is in the home and has CP and is total care-and is also cared for by a waiver by Paulina.    Pt was born in [place], one of 5-including a twin sister Mechelle Hunter, very ill in 2018, dec 2019    Moved to [place], , 2 children, one son (disabled with CP) and one daughter Paulina    Worked at [employer], as a nurse's aide Disabled due to mental illness and back pain    Lived at [facility], was discharged,  maintained on large dose midodrine    Plan/  1- HD today as ordered.  Will try to get fluid off however she is not tolerating it well.  2-we will see if cardiology has any recommendations but hard to imagine what could be done differently with a normal ejection fraction and no signs of pericardial effusion  3-continue midodrine  4-follow potassium after dialysis.  Add Lokelma to give us a little bit more room for potassium  5-will discuss with Dr. Finch possible next step.  1 would just be aggressive with fluid removal despite the hypotension given the Crit-Line shows that she has the fluid on her to give.    Thank you for the consultation.  Please do not hesitate to call with questions.    Jorge Puentes MD

## 2024-03-30 NOTE — PROGRESS NOTES
1920- Assumed care of patient for 12 hour shift. Chart and medications reviewed. Patient resting in bed at this time. Bed in lowest position, locked and bed alarm on.  Call light and belongings within reach.     2125- PM assessment completed. Heart sounds irregular. Lung Sounds diminished. 100% 3L NC. Bowel sounds active x4. Pedal pulses palpable bilaterally. No edema noted. Sore to right pinky toe. Fistula in left arm thrill present. Wound prevention to bilateral heels. #20 right AC flushed, saline locked and dressing intact. VS stable. Call light and belongings in reach.     0030- Patient resting in bed. Bed in lowest position and locked with bed alarm on. VS stable. No signs of acute distress at this time.   Call light and belongings remain in reach.    0308- Zofran 4 mg given via IV for c/o nausea.     0415- Patient resting in bed at this time. Bed in lowest position and locked with bed alarm on. VS stable. No signs of acute distress at this time. Call light and belongings remain in reach.    0630- Patient resting in bed at this time. No acute distress noted. Call light and belongings remain within reach.     Electronically signed by Yeimy Curtis RN on 3/30/2024 at 6:33 AM

## 2024-03-30 NOTE — DIALYSIS
Patient Education        After Your Delivery (the Postpartum Period): Care Instructions  Your Care Instructions     Congratulations on the birth of your baby. Like pregnancy, the  period can be a time of excitement, gennaro, and exhaustion. You may look at your wondrous little baby and feel happy. You may also be overwhelmed by your new sleep hours and new responsibilities. At first, babies often sleep during the days and are awake at night. They do not have a pattern or routine. They may make sudden gasps, jerk themselves awake, or look like they have crossed eyes. These are all normal, and they may even make you smile. In these first weeks after delivery, try to take good care of yourself. It may take 4 to 6 weeks to feel like yourself again, and possibly longer if you had a  birth. You will likely feel very tired for several weeks. Your days will be full of ups and downs, but lots of gennaro as well. Follow-up care is a key part of your treatment and safety. Be sure to make and go to all appointments, and call your doctor if you are having problems. It's also a good idea to know your test results and keep a list of the medicines you take. How can you care for yourself at home? Take care of your body after delivery  · Use pads instead of tampons for the bloody flow that may last as long as 2 weeks. · Ease cramps with ibuprofen (Advil, Motrin). · Ease soreness of hemorrhoids and the area between your vagina and rectum with ice compresses or witch hazel pads. · Ease constipation by drinking lots of fluid and eating high-fiber foods. Ask your doctor about over-the-counter stool softeners. · Cleanse yourself with a gentle squeeze of warm water from a bottle instead of wiping with toilet paper. · Take a sitz bath in warm water several times a day. · Wear a good nursing bra. Ease sore and swollen breasts with warm, wet washcloths.   · If you are not breastfeeding, use ice rather than heat for breast Patient did not tolerate dialysis well today. Unable to pull much fluid even though volume overloaded. 2 doses of albumin given, patient received dose of midodrine prior to tx and one during treatment. Patient remained hypotensive throughout treatment. 1000ml total net UF removed. Spoke to Dr Puentes and he is aware of hypotension upon discharge from dialysis suite. AVF functioned without issue. Patieht achieved hemostasis within 15 minutes post treatment. Report called to primary RN. Patient transported back to room via patient transport in stable condition and asymptomatic of hypotension at this time.     soreness. · Your period may not start for several months if you are breastfeeding. You may bleed more, and longer at first, than you did before you got pregnant. · Wait until you are healed (about 4 to 6 weeks) before you have sexual intercourse. Your doctor will tell you when it is okay to have sex. · Try not to travel with your baby for 5 or 6 weeks. If you take a long car trip, make frequent stops to walk around and stretch. Avoid exhaustion  · Rest every day. Try to nap when your baby naps. · Ask another adult to be with you for a few days after delivery. · Plan for  if you have other children. · Stay flexible so you can eat at odd hours and sleep when you need to. Both you and your baby are making new schedules. · Plan small trips to get out of the house. Change can make you feel less tired. · Ask for help with housework, cooking, and shopping. Remind yourself that your job is to care for your baby. Know about help for postpartum depression  · \"Baby blues\" are common for the first 1 to 2 weeks after birth. You may cry or feel sad or irritable for no reason. · Rest whenever you can. Being tired makes it harder to handle your emotions. · Go for walks with your baby. · Talk to your partner, friends, and family about your feelings. · If your symptoms last for more than a few weeks, or if you feel very depressed, ask your doctor for help. · Postpartum depression can be treated. Support groups and counseling can help. Sometimes medicine can also help. Stay healthy  · Eat healthy foods so you have more energy and lose extra baby pounds. · If you breastfeed, avoid drugs. If you quit smoking during pregnancy, try to stay smoke-free. If you choose to have a drink now and then, have only one drink, and limit the number of occasions that you have a drink. Wait to breastfeed at least 2 hours after you have a drink to reduce the amount of alcohol the baby may get in the milk.   · Start daily exercise after 4 to 6 weeks, but rest when you feel tired. · Learn exercises to tone your belly. Do Kegel exercises to regain strength in your pelvic muscles. You can do these exercises while you stand or sit. ? Squeeze the same muscles you would use to stop your urine. Your belly and thighs should not move. ? Hold the squeeze for 3 seconds, and then relax for 3 seconds. ? Start with 3 seconds. Then add 1 second each week until you are able to squeeze for 10 seconds. ? Repeat the exercise 10 to 15 times for each session. Do three or more sessions each day. · Find a class for new mothers and new babies that has an exercise time. · If you had a  birth, give yourself a bit more time before you exercise, and be careful. When should you call for help? Call  911 anytime you think you may need emergency care. For example, call if:    · You have thoughts of harming yourself, your baby, or another person.     · You passed out (lost consciousness).     · You have chest pain, are short of breath, or cough up blood.     · You have a seizure. Call your doctor now or seek immediate medical care if:    · You have severe vaginal bleeding. This means you are passing blood clots and soaking through a pad each hour for 2 or more hours.     · You are dizzy or lightheaded, or you feel like you may faint.     · You have a fever.     · You have new or more belly pain.     · You have signs of a blood clot in your leg (called a deep vein thrombosis), such as:  ? Pain in the calf, back of the knee, thigh, or groin. ? Redness and swelling in your leg or groin.     · You have signs of preeclampsia, such as:  ? Sudden swelling of your face, hands, or feet. ? New vision problems (such as dimness, blurring, or seeing spots). ? A severe headache.    Watch closely for changes in your health, and be sure to contact your doctor if:    · Your vaginal bleeding seems to be getting heavier.     · You have new or worse vaginal discharge.     · You feel sad, anxious, or hopeless for more than a few days.     · You do not get better as expected. Where can you learn more? Go to http://www.[employer].com/  Enter A461 in the search box to learn more about \"After Your Delivery (the Postpartum Period): Care Instructions. \"  Current as of: October 8, 2020               Content Version: 12.8  © 2006-2021 [employer]. Care instructions adapted under license by [employer] (which disclaims liability or warranty for this information). If you have questions about a medical condition or this instruction, always ask your healthcare professional. [address] any warranty or liability for your use of this information.

## 2024-03-30 NOTE — PLAN OF CARE
Problem: Discharge Planning  Goal: Discharge to home or other facility with appropriate resources  Outcome: Progressing  Flowsheets  Taken 3/30/2024 0816 by Nevin Chiu, RN  Discharge to home or other facility with appropriate resources:   Identify barriers to discharge with patient and caregiver   Arrange for needed discharge resources and transportation as appropriate   Identify discharge learning needs (meds, wound care, etc)   Refer to discharge planning if patient needs post-hospital services based on physician order or complex needs related to functional status, cognitive ability or social support system  Taken 3/29/2024 2125 by Yeimy Curtis RN  Discharge to home or other facility with appropriate resources: Identify barriers to discharge with patient and caregiver     Problem: Safety - Adult  Goal: Free from fall injury  Outcome: Progressing     Problem: Skin/Tissue Integrity  Goal: Absence of new skin breakdown  Outcome: Progressing    Electronically signed by Nevin Chiu RN on 3/30/2024 at 8:29 AM

## 2024-03-30 NOTE — PROGRESS NOTES
Assumed care of patient for 12 hour shift.  Chart and medications reviewed.  Bed in lowest position, wheels locked, 2 side rails up and bed alarm on. Personal belongings and call light within reach.       NOTES:  0725: received bedside shift report from Yeimy RN    0806: gave report to Gerald in Dialysis. Sending for pt in 10 minutes    0816: shift assessment and medication administration completed.     0835: transport arrived to take pt to dialysis.     1052: Gerald in dialysis called, requesting pt midodrine be brought down to dialysis. Pt SBP running upper 70s and lower 80s. mario Duarte, already spoke with Dr. Puentes. Dr. Cintron walked down to dialysis with this RN to see pt. Midodrine given at that time.    1406: Pt received fluids for hypotension.     1525: Pt BP 99/40 MAP 54 post bolus.     1613: PCA took pt vitals, BP 80/34 MAP 49. This RN sent message to Dr. Cintron about BP. See new orders.    1632: Called pharmacy to get 20mg Midodrine four times daily verified, per [facility] Pharmacist, this dose exceeds normal limits and pt should likely be on pressers. Sent message to Dr. Beltrán- he stated \"send the pt to icu\". Unable to take order from [facility]. This RN spoke with charge nurse and [facility] nurse supervisor. This RN sent message to Dr. Cintron - Dr. Cintron called this RN, details were explained and this RN received verbal order to transfer pt to ICU. Order placed.    1658: Pt going to ICU bed #5    Electronically signed by Nevin Chiu RN on 3/30/2024 at 5:01 PM

## 2024-03-30 NOTE — PROGRESS NOTES
03/29/24 1942   RT Protocol   History Pulmonary Disease 2   Respiratory pattern 0   Breath sounds 2   Cough 0   Indications for Bronchodilator Therapy On home bronchodilators   Bronchodilator Assessment Score 4

## 2024-03-31 LAB
ANION GAP SERPL CALCULATED.3IONS-SCNC: 21 MEQ/L (ref 9–15)
BASOPHILS # BLD: 0 K/UL (ref 0–0.2)
BASOPHILS NFR BLD: 0.2 %
BUN SERPL-MCNC: 33 MG/DL (ref 8–23)
CALCIUM SERPL-MCNC: 8.5 MG/DL (ref 8.5–9.9)
CHLORIDE SERPL-SCNC: 91 MEQ/L (ref 95–107)
CO2 SERPL-SCNC: 22 MEQ/L (ref 20–31)
CREAT SERPL-MCNC: 4.61 MG/DL (ref 0.5–0.9)
EKG ATRIAL RATE: 227 BPM
EKG P AXIS: 96 DEGREES
EKG Q-T INTERVAL: 496 MS
EKG QRS DURATION: 132 MS
EKG QTC CALCULATION (BAZETT): 486 MS
EKG R AXIS: -76 DEGREES
EKG T AXIS: 104 DEGREES
EKG VENTRICULAR RATE: 58 BPM
EOSINOPHIL # BLD: 0 K/UL (ref 0–0.7)
EOSINOPHIL NFR BLD: 0.2 %
ERYTHROCYTE [DISTWIDTH] IN BLOOD BY AUTOMATED COUNT: 17.6 % (ref 11.5–14.5)
GLUCOSE BLD-MCNC: 250 MG/DL (ref 70–99)
GLUCOSE BLD-MCNC: 259 MG/DL (ref 70–99)
GLUCOSE BLD-MCNC: 271 MG/DL (ref 70–99)
GLUCOSE SERPL-MCNC: 234 MG/DL (ref 70–99)
HCT VFR BLD AUTO: 38.1 % (ref 37–47)
HGB BLD-MCNC: 12.4 G/DL (ref 12–16)
LYMPHOCYTES # BLD: 2.9 K/UL (ref 1–4.8)
LYMPHOCYTES NFR BLD: 23.8 %
MCH RBC QN AUTO: 33.3 PG (ref 27–31.3)
MCHC RBC AUTO-ENTMCNC: 32.5 % (ref 33–37)
MCV RBC AUTO: 102.4 FL (ref 79.4–94.8)
MONOCYTES # BLD: 1.4 K/UL (ref 0.2–0.8)
MONOCYTES NFR BLD: 11.3 %
NEUTROPHILS # BLD: 7.8 K/UL (ref 1.4–6.5)
NEUTS SEG NFR BLD: 64.1 %
PERFORMED ON: ABNORMAL
PLATELET # BLD AUTO: 186 K/UL (ref 130–400)
POTASSIUM SERPL-SCNC: 5.3 MEQ/L (ref 3.4–4.9)
RBC # BLD AUTO: 3.72 M/UL (ref 4.2–5.4)
SODIUM SERPL-SCNC: 134 MEQ/L (ref 135–144)
WBC # BLD AUTO: 12.2 K/UL (ref 4.8–10.8)

## 2024-03-31 PROCEDURE — 6360000002 HC RX W HCPCS: Performed by: INTERNAL MEDICINE

## 2024-03-31 PROCEDURE — 80048 BASIC METABOLIC PNL TOTAL CA: CPT

## 2024-03-31 PROCEDURE — 87641 MR-STAPH DNA AMP PROBE: CPT

## 2024-03-31 PROCEDURE — 2700000000 HC OXYGEN THERAPY PER DAY

## 2024-03-31 PROCEDURE — 94760 N-INVAS EAR/PLS OXIMETRY 1: CPT

## 2024-03-31 PROCEDURE — 94761 N-INVAS EAR/PLS OXIMETRY MLT: CPT

## 2024-03-31 PROCEDURE — 2500000003 HC RX 250 WO HCPCS: Performed by: INTERNAL MEDICINE

## 2024-03-31 PROCEDURE — 6370000000 HC RX 637 (ALT 250 FOR IP): Performed by: INTERNAL MEDICINE

## 2024-03-31 PROCEDURE — 99291 CRITICAL CARE FIRST HOUR: CPT | Performed by: INTERNAL MEDICINE

## 2024-03-31 PROCEDURE — 99233 SBSQ HOSP IP/OBS HIGH 50: CPT | Performed by: INTERNAL MEDICINE

## 2024-03-31 PROCEDURE — 2500000003 HC RX 250 WO HCPCS: Performed by: NURSE PRACTITIONER

## 2024-03-31 PROCEDURE — 36415 COLL VENOUS BLD VENIPUNCTURE: CPT

## 2024-03-31 PROCEDURE — 2580000003 HC RX 258: Performed by: INTERNAL MEDICINE

## 2024-03-31 PROCEDURE — 85025 COMPLETE CBC W/AUTO DIFF WBC: CPT

## 2024-03-31 PROCEDURE — 94640 AIRWAY INHALATION TREATMENT: CPT

## 2024-03-31 PROCEDURE — 2000000000 HC ICU R&B

## 2024-03-31 RX ORDER — HYDROXYZINE HYDROCHLORIDE 25 MG/1
25 TABLET, FILM COATED ORAL 2 TIMES DAILY
Status: DISCONTINUED | OUTPATIENT
Start: 2024-03-31 | End: 2024-04-12 | Stop reason: HOSPADM

## 2024-03-31 RX ADMIN — MICONAZOLE NITRATE: 2 POWDER TOPICAL at 21:16

## 2024-03-31 RX ADMIN — ONDANSETRON 4 MG: 2 INJECTION INTRAMUSCULAR; INTRAVENOUS at 07:05

## 2024-03-31 RX ADMIN — ONDANSETRON 4 MG: 2 INJECTION INTRAMUSCULAR; INTRAVENOUS at 19:50

## 2024-03-31 RX ADMIN — Medication 2 MCG/MIN: at 22:48

## 2024-03-31 RX ADMIN — SEVELAMER CARBONATE 800 MG: 800 TABLET, FILM COATED ORAL at 07:52

## 2024-03-31 RX ADMIN — INSULIN LISPRO 2 UNITS: 100 INJECTION, SOLUTION INTRAVENOUS; SUBCUTANEOUS at 07:53

## 2024-03-31 RX ADMIN — SODIUM CHLORIDE, PRESERVATIVE FREE 10 ML: 5 INJECTION INTRAVENOUS at 21:12

## 2024-03-31 RX ADMIN — ARIPIPRAZOLE 5 MG: 5 TABLET ORAL at 07:53

## 2024-03-31 RX ADMIN — ASPIRIN 81 MG: 81 TABLET, COATED ORAL at 07:53

## 2024-03-31 RX ADMIN — HYDROXYZINE HYDROCHLORIDE 25 MG: 25 TABLET ORAL at 21:11

## 2024-03-31 RX ADMIN — MIDODRINE HYDROCHLORIDE 10 MG: 10 TABLET ORAL at 07:53

## 2024-03-31 RX ADMIN — SEVELAMER CARBONATE 800 MG: 800 TABLET, FILM COATED ORAL at 21:11

## 2024-03-31 RX ADMIN — INSULIN LISPRO 4 UNITS: 100 INJECTION, SOLUTION INTRAVENOUS; SUBCUTANEOUS at 16:33

## 2024-03-31 RX ADMIN — ONDANSETRON 4 MG: 2 INJECTION INTRAMUSCULAR; INTRAVENOUS at 13:26

## 2024-03-31 RX ADMIN — MICONAZOLE NITRATE: 2 POWDER TOPICAL at 07:53

## 2024-03-31 RX ADMIN — CEFAZOLIN 2000 MG: 2 INJECTION, POWDER, FOR SOLUTION INTRAMUSCULAR; INTRAVENOUS at 06:51

## 2024-03-31 RX ADMIN — ALBUTEROL SULFATE 2.5 MG: 2.5 SOLUTION RESPIRATORY (INHALATION) at 15:20

## 2024-03-31 RX ADMIN — SODIUM ZIRCONIUM CYCLOSILICATE 10 G: 10 POWDER, FOR SUSPENSION ORAL at 10:17

## 2024-03-31 RX ADMIN — ALBUTEROL SULFATE 2.5 MG: 2.5 SOLUTION RESPIRATORY (INHALATION) at 03:14

## 2024-03-31 RX ADMIN — MIDODRINE HYDROCHLORIDE 10 MG: 10 TABLET ORAL at 16:33

## 2024-03-31 RX ADMIN — SERTRALINE HYDROCHLORIDE 50 MG: 50 TABLET ORAL at 21:11

## 2024-03-31 RX ADMIN — CEFAZOLIN 2000 MG: 2 INJECTION, POWDER, FOR SOLUTION INTRAMUSCULAR; INTRAVENOUS at 17:21

## 2024-03-31 RX ADMIN — MIDODRINE HYDROCHLORIDE 10 MG: 10 TABLET ORAL at 10:17

## 2024-03-31 RX ADMIN — APIXABAN 2.5 MG: 2.5 TABLET, FILM COATED ORAL at 07:53

## 2024-03-31 RX ADMIN — PANTOPRAZOLE SODIUM 20 MG: 20 TABLET, DELAYED RELEASE ORAL at 07:53

## 2024-03-31 RX ADMIN — ATORVASTATIN CALCIUM 40 MG: 40 TABLET, FILM COATED ORAL at 21:11

## 2024-03-31 ASSESSMENT — PAIN SCALES - GENERAL
PAINLEVEL_OUTOF10: 0

## 2024-03-31 NOTE — PROGRESS NOTES
Cardiology progress note    Patient Name: Michelle Le  Admit Date: 3/29/2024 10:23 AM  MR #: [id number]  : 1960    Attending Physician: Adali Cintron MD  Reason for consult: Hypotension    History of Presenting Illness:      Michelle Le is a 64 y.o. female on hospital day 2 with a history of CAD prior PCI to the LAD, CABG LIMA to LAD and tricuspid valve repair complicated by tamponade and pericardial window, end-stage renal disease on hemodialysis, hypertension, hyperlipidemia, atrial fibrillation on Eliquis admitted to the hospital for shortness of breath. History Obtained From:  patient, electronic medical record    Patient hyperkalemic on admission 6.4  EKG atrial flutter 58 bpm  Troponins flat 130  =================  Hospital course  3/31/2024  Patient now in ICU due to hypotension  Currently patient on Levophed  Patient looks comfortable denies chest pain or shortness of breath  Still hyperkalemic but condition improving    History:      Past Medical History:   Diagnosis Date    Angina at rest 2020    Anxiety     CAD S/P percutaneous coronary angioplasty ,     stents per Huong Sanabria    CHF (congestive heart failure) ([facility])     CKD (chronic kidney disease) stage 4, GFR 15-29 ml/min ([facility]) 2018    CKD stage 4 due to type 2 diabetes mellitus ([facility])     Contusion of right chest wall 2021    COPD (chronic obstructive pulmonary disease) ([facility])     Diabetic nephropathy with proteinuria ([facility])     DJD (degenerative joint disease) of knee     Dr Sharma    GERD (gastroesophageal reflux disease)     Hemiparesis, left ([facility])     entered Assisted Living ([place])    Hemodialysis patient ([facility])     Hemodialysis-associated hypotension 10/22/2021    History of heart failure     History of seizures     History of type C viral hepatitis     HTN (hypertension)     Hyperlipidemia     Impaired mobility and activities of daily living     Infection of venous access port

## 2024-03-31 NOTE — PROGRESS NOTES
Renal Progress Note    Assessment and Plan:   64-year-old lady with end-stage renal disease on hemodialysis Tuesday Thursday Saturday.  Chronic hypotension maintained on large dose midodrine.  Admitted with SOB.  Sig above dry weight.  Doesn't make urine.  EF is normal.  Transferred to ICU with hypotension.      Plan/  1-add lokelma to help keep k down  2. Limit any fluid boluses  3. Will hold eliquis for possible temporary HD cathter placement  4. May end up doing crrt to try and get the fluid off but continuing HD without fluid removal and even getting fluid boluses in pateint who doesn't make urine cannot be maintained.  The hope would be bp would improve with fluid removal.  She was profile a on crit line indicating she is intravascularly fluid overloaded       Patient Active Problem List:     Coronary artery disease involving native coronary artery of native heart with angina pectoris (HCC)     Schizophrenia, paranoid, chronic     Metabolic syndrome     Vitamin B 12 deficiency     Cerebral microvascular disease     Mixed hyperlipidemia     Other hammer toe (acquired)     Vitamin D insufficiency     Incontinence of urine     Diabetic nephropathy with proteinuria (HCC)     Essential (primary) hypertension     History of type C viral hepatitis     Urinary incontinence due to cognitive impairment     History of seizures     Stented coronary artery-plan is to stay on Plavix indefinately per Dr Walker     Diabetes mellitus (HCC)     Controlled type 2 diabetes mellitus with diabetic neuropathy, with long-term current use of insulin ([facility])     Hemiparesis, left (HCC)     Angina, class II ([facility])     Pain, unspecified     Tardive dyskinesia     Shortness of breath     Uncontrolled type 2 diabetes mellitus with hyperglycemia (HCC)     Chronic diastolic congestive heart failure (HCC)     [place] (obstructive sleep apnea)     Pulmonary hypertension, unspecified (HCC)     Class 2 severe obesity with serious comorbidity and body  mass index (BMI) of 36.0 to 36.9 in adult ([facility])     Edema     Closed supracondylar fracture of right humerus     Other chronic pain     Palliative care patient     Recurrent falls     Renal failure     Difficulty in walking     ESRD (end stage renal disease) on dialysis ([facility])     Weakness     Other seizures ([facility])     Moderate persistent asthma without complication     COVID-19     Post PTCA     Falls frequently     Chest wall contusion, left, initial encounter     Headache, unspecified     Paranoid schizophrenia ([facility])     Compression fracture of spine ([facility])     Closed rib fracture     Depression     Chronic obstructive pulmonary disease ([facility])     Critical illness polyneuropathy ([facility])     Multiple closed fractures of ribs of right side     Nonrheumatic mitral valve regurgitation     Nonrheumatic tricuspid valve regurgitation     Need for extended care facility     Chronic pain of both shoulders     Hemodialysis-associated hypotension     Anginal chest pain at rest     Chest pain     Unstable angina ([facility])     NSTEMI (non-ST elevated myocardial infarction) ([facility])     CKD (chronic kidney disease) stage 4, GFR 15-29 ml/min ([facility])     Hyperkalemia     Impaired mobility and activities of daily living dt polyneuropathy and reccent fall      Dialysis patient ([facility])     Unspecified open wound of right upper arm, initial encounter     Multiple closed fractures of right lower extremity and ribs     Closed T11 fracture ([facility])     Encephalopathy acute     MRSA bacteremia     Sepsis due to Enterococcus ([facility])     Local infection due to central venous catheter     DJD (degenerative joint disease), cervical     Closed head injury     Sarcopenia     Fall from standing     Septicemia (HCC)     Chronic hepatitis C (HCC)     Catheter-related bloodstream infection     Enterococcus faecalis infection     Cervical stenosis of spinal canal     Ataxic gait     Lumbar stenosis with neurogenic claudication     Falls infrequently     Infection of

## 2024-03-31 NOTE — PLAN OF CARE
Problem: Discharge Planning  Goal: Discharge to home or other facility with appropriate resources  Outcome: Not Progressing     Problem: Safety - Adult  Goal: Free from fall injury  Outcome: Not Progressing     Problem: Skin/Tissue Integrity  Goal: Absence of new skin breakdown  Description: 1.  Monitor for areas of redness and/or skin breakdown  2.  Assess vascular access sites hourly  3.  Every 4-6 hours minimum:  Change oxygen saturation probe site  4.  Every 4-6 hours:  If on nasal continuous positive airway pressure, respiratory therapy assess nares and determine need for appliance change or resting period.  Outcome: Not Progressing     Problem: Discharge Planning  Goal: Discharge to home or other facility with appropriate resources  Outcome: Not Progressing     Problem: Safety - Adult  Goal: Free from fall injury  Outcome: Not Progressing     Problem: Skin/Tissue Integrity  Goal: Absence of new skin breakdown  Description: 1.  Monitor for areas of redness and/or skin breakdown  2.  Assess vascular access sites hourly  3.  Every 4-6 hours minimum:  Change oxygen saturation probe site  4.  Every 4-6 hours:  If on nasal continuous positive airway pressure, respiratory therapy assess nares and determine need for appliance change or resting period.  Outcome: Not Progressing

## 2024-03-31 NOTE — PROGRESS NOTES
Progress Note  Date:3/31/2024       [facility]  Patient Name:Michelle Le     YOB: 1960     Age:64 y.o.        Subjective    Subjective:  Symptoms:  She reports weakness.  No shortness of breath, malaise, cough, chest pain, headache, chest pressure, anorexia, diarrhea or anxiety.    Diet:  No nausea or vomiting.       Review of Systems   Respiratory:  Negative for cough and shortness of breath.    Cardiovascular:  Negative for chest pain.   Gastrointestinal:  Negative for anorexia, diarrhea, nausea and vomiting.   Neurological:  Positive for weakness.     Objective         Vitals Last 24 Hours:  TEMPERATURE:  Temp  Av.8 °F (36.6 °C)  Min: 97.4 °F (36.3 °C)  Max: 98.4 °F (36.9 °C)  RESPIRATIONS RANGE: Resp  Avg: 15.3  Min: 11  Max: 24  PULSE OXIMETRY RANGE: SpO2  Av.8 %  Min: 88 %  Max: 100 %  PULSE RANGE: Pulse  Av.1  Min: 39  Max: 87  BLOOD PRESSURE RANGE: Systolic (24hrs), Av , Min:60 , Max:117   ; Diastolic (24hrs), Av, Min:14, Max:60    I/O (24Hr):    Intake/Output Summary (Last 24 hours) at 3/31/2024 1111  Last data filed at 3/31/2024 0812  Gross per 24 hour   Intake 1038.81 ml   Output --   Net 1038.81 ml       Objective:  General Appearance:  In no acute distress.    Vital signs: (most recent): Blood pressure (!) 71/15, pulse 79, temperature 97.7 °F (36.5 °C), temperature source Oral, resp. rate 17, height 1.702 m (5' 7\"), weight 72.3 kg (159 lb 6.3 oz), SpO2 93 %, not currently breastfeeding.    HEENT: Normal HEENT exam.    Lungs:  There are decreased breath sounds.    Heart: S1 normal and S2 normal.  No gallop.   Abdomen: Abdomen is soft.  Bowel sounds are normal.   There is no epigastric area or suprapubic area tenderness.     Extremities: There is no deformity.    Neurological: Patient is alert and oriented to person, place and time.    Pupils:  Pupils are equal, round, and reactive to light.    Skin:  Warm.      Labs/Imaging/Diagnostics

## 2024-03-31 NOTE — PLAN OF CARE
Problem: Discharge Planning  Goal: Discharge to home or other facility with appropriate resources  3/31/2024 0329 by Stephani Trujillo RN  Outcome: Progressing  3/30/2024 2239 by Stephani Trujillo RN  Outcome: Not Progressing     Problem: Safety - Adult  Goal: Free from fall injury  3/31/2024 0329 by Stephani Trujillo RN  Outcome: Progressing  3/30/2024 2239 by Stephani Trujillo RN  Outcome: Not Progressing     Problem: Skin/Tissue Integrity  Goal: Absence of new skin breakdown  Description: 1.  Monitor for areas of redness and/or skin breakdown  2.  Assess vascular access sites hourly  3.  Every 4-6 hours minimum:  Change oxygen saturation probe site  4.  Every 4-6 hours:  If on nasal continuous positive airway pressure, respiratory therapy assess nares and determine need for appliance change or resting period.  3/31/2024 0329 by Stephani Trujillo RN  Outcome: Progressing  3/30/2024 2239 by Stephani Trujillo RN  Outcome: Not Progressing     Problem: Discharge Planning  Goal: Discharge to home or other facility with appropriate resources  3/31/2024 0329 by Stephani Trujillo RN  Outcome: Progressing  3/30/2024 2239 by Stephani Trujillo RN  Outcome: Not Progressing     Problem: Safety - Adult  Goal: Free from fall injury  3/31/2024 0329 by Stephani Trujillo RN  Outcome: Progressing  3/30/2024 2239 by Stephani Trujillo RN  Outcome: Not Progressing     Problem: Skin/Tissue Integrity  Goal: Absence of new skin breakdown  Description: 1.  Monitor for areas of redness and/or skin breakdown  2.  Assess vascular access sites hourly  3.  Every 4-6 hours minimum:  Change oxygen saturation probe site  4.  Every 4-6 hours:  If on nasal continuous positive airway pressure, respiratory therapy assess nares and determine need for appliance change or resting period.  3/31/2024 0329 by Stephani Trujillo RN  Outcome: Progressing  3/30/2024 2239 by Stephani Trujillo RN  Outcome: Not Progressing

## 2024-03-31 NOTE — CONSULTS
Critical Care Consult        Admit Date: 3/29/2024    PCP:  Adilene Terry MD       CHIEF COMPLAINT / HPI:                The patient is a 64 y.o. female with significant past medical history of coronary artery disease, CKD on hemodialysis, congestive heart failure, known hypotension on high dose of midodrine.  She presented with weight gain, edema and shortness of breath.  Apparently she gained about 20 pounds.  Has been struggling with dialysis due to hypotension.  She was significantly hypotensive in the ER and was started on Levophed and therefore transferred to the ICU.     Past Medical History:      Diagnosis Date    Angina at rest 5/24/2020    Anxiety     CAD S/P percutaneous coronary angioplasty 2015, 2018    stents per Huong Sanabria    CHF (congestive heart failure) ([facility])     CKD (chronic kidney disease) stage 4, GFR 15-29 ml/min ([facility]) 2/24/2018    CKD stage 4 due to type 2 diabetes mellitus ([facility])     Contusion of right chest wall 2/16/2021    COPD (chronic obstructive pulmonary disease) ([facility])     Diabetic nephropathy with proteinuria ([facility]) 2014    DJD (degenerative joint disease) of knee     Dr Sharma    GERD (gastroesophageal reflux disease)     Hemiparesis, left ([facility]) 2013    entered Assisted Living ([facility])    Hemodialysis patient ([facility])     Hemodialysis-associated hypotension 10/22/2021    History of heart failure     History of seizures     History of type C viral hepatitis     HTN (hypertension)     Hyperlipidemia     Impaired mobility and activities of daily living     Infection of venous access port 7/29/2022    Mediastinal lymphadenopathy 2013    Sangeeta Cummings    Metabolic syndrome     Moderate persistent asthma without complication 9/23/2020    Need for extended care facility 7/7/2021    Neurogenic urinary incontinence 2013    Neuropathy in diabetes ([facility])     Nonrheumatic mitral valve regurgitation 7/7/2021    Nonrheumatic tricuspid valve regurgitation 7/7/2021    Obesity

## 2024-04-01 LAB
ANION GAP SERPL CALCULATED.3IONS-SCNC: 21 MEQ/L (ref 9–15)
BASOPHILS # BLD: 0 K/UL (ref 0–0.2)
BASOPHILS NFR BLD: 0.2 %
BUN SERPL-MCNC: 46 MG/DL (ref 8–23)
CALCIUM SERPL-MCNC: 8.7 MG/DL (ref 8.5–9.9)
CHLORIDE SERPL-SCNC: 93 MEQ/L (ref 95–107)
CO2 SERPL-SCNC: 24 MEQ/L (ref 20–31)
CREAT SERPL-MCNC: 5.56 MG/DL (ref 0.5–0.9)
EOSINOPHIL # BLD: 0 K/UL (ref 0–0.7)
EOSINOPHIL NFR BLD: 0.3 %
ERYTHROCYTE [DISTWIDTH] IN BLOOD BY AUTOMATED COUNT: 17.2 % (ref 11.5–14.5)
GLUCOSE BLD-MCNC: 212 MG/DL (ref 70–99)
GLUCOSE BLD-MCNC: 232 MG/DL (ref 70–99)
GLUCOSE BLD-MCNC: 317 MG/DL (ref 70–99)
GLUCOSE SERPL-MCNC: 272 MG/DL (ref 70–99)
HCT VFR BLD AUTO: 38.2 % (ref 37–47)
HGB BLD-MCNC: 12.4 G/DL (ref 12–16)
LYMPHOCYTES # BLD: 1.3 K/UL (ref 1–4.8)
LYMPHOCYTES NFR BLD: 14.7 %
MCH RBC QN AUTO: 33 PG (ref 27–31.3)
MCHC RBC AUTO-ENTMCNC: 32.5 % (ref 33–37)
MCV RBC AUTO: 101.6 FL (ref 79.4–94.8)
MONOCYTES # BLD: 0.8 K/UL (ref 0.2–0.8)
MONOCYTES NFR BLD: 9 %
MRSA, DNA, NASAL: NEGATIVE
NEUTROPHILS # BLD: 6.7 K/UL (ref 1.4–6.5)
NEUTS SEG NFR BLD: 75.5 %
PERFORMED ON: ABNORMAL
PLATELET # BLD AUTO: 132 K/UL (ref 130–400)
POTASSIUM SERPL-SCNC: 4.7 MEQ/L (ref 3.4–4.9)
RBC # BLD AUTO: 3.76 M/UL (ref 4.2–5.4)
SODIUM SERPL-SCNC: 138 MEQ/L (ref 135–144)
SPECIMEN DESCRIPTION: NORMAL
WBC # BLD AUTO: 8.9 K/UL (ref 4.8–10.8)

## 2024-04-01 PROCEDURE — 99232 SBSQ HOSP IP/OBS MODERATE 35: CPT | Performed by: INTERNAL MEDICINE

## 2024-04-01 PROCEDURE — 6370000000 HC RX 637 (ALT 250 FOR IP): Performed by: INTERNAL MEDICINE

## 2024-04-01 PROCEDURE — 99213 OFFICE O/P EST LOW 20 MIN: CPT

## 2024-04-01 PROCEDURE — 2000000000 HC ICU R&B

## 2024-04-01 PROCEDURE — 6360000002 HC RX W HCPCS: Performed by: INTERNAL MEDICINE

## 2024-04-01 PROCEDURE — 80048 BASIC METABOLIC PNL TOTAL CA: CPT

## 2024-04-01 PROCEDURE — 94640 AIRWAY INHALATION TREATMENT: CPT

## 2024-04-01 PROCEDURE — 36415 COLL VENOUS BLD VENIPUNCTURE: CPT

## 2024-04-01 PROCEDURE — 2580000003 HC RX 258: Performed by: INTERNAL MEDICINE

## 2024-04-01 PROCEDURE — 6370000000 HC RX 637 (ALT 250 FOR IP)

## 2024-04-01 PROCEDURE — 99232 SBSQ HOSP IP/OBS MODERATE 35: CPT | Performed by: NURSE PRACTITIONER

## 2024-04-01 PROCEDURE — 99291 CRITICAL CARE FIRST HOUR: CPT | Performed by: INTERNAL MEDICINE

## 2024-04-01 PROCEDURE — 85025 COMPLETE CBC W/AUTO DIFF WBC: CPT

## 2024-04-01 PROCEDURE — 94761 N-INVAS EAR/PLS OXIMETRY MLT: CPT

## 2024-04-01 PROCEDURE — 99223 1ST HOSP IP/OBS HIGH 75: CPT | Performed by: INTERNAL MEDICINE

## 2024-04-01 PROCEDURE — 93005 ELECTROCARDIOGRAM TRACING: CPT | Performed by: INTERNAL MEDICINE

## 2024-04-01 PROCEDURE — 2500000003 HC RX 250 WO HCPCS: Performed by: INTERNAL MEDICINE

## 2024-04-01 RX ORDER — INSULIN LISPRO 100 [IU]/ML
0-16 INJECTION, SOLUTION INTRAVENOUS; SUBCUTANEOUS
Status: DISCONTINUED | OUTPATIENT
Start: 2024-04-01 | End: 2024-04-09

## 2024-04-01 RX ORDER — MIDODRINE HYDROCHLORIDE 5 MG/1
5 TABLET ORAL ONCE
Status: COMPLETED | OUTPATIENT
Start: 2024-04-01 | End: 2024-04-01

## 2024-04-01 RX ORDER — INSULIN LISPRO 100 [IU]/ML
0-4 INJECTION, SOLUTION INTRAVENOUS; SUBCUTANEOUS NIGHTLY
Status: DISCONTINUED | OUTPATIENT
Start: 2024-04-01 | End: 2024-04-09

## 2024-04-01 RX ADMIN — MIDODRINE HYDROCHLORIDE 15 MG: 10 TABLET ORAL at 11:52

## 2024-04-01 RX ADMIN — ALBUTEROL SULFATE 2.5 MG: 2.5 SOLUTION RESPIRATORY (INHALATION) at 14:50

## 2024-04-01 RX ADMIN — SEVELAMER CARBONATE 800 MG: 800 TABLET, FILM COATED ORAL at 08:42

## 2024-04-01 RX ADMIN — HYDROXYZINE HYDROCHLORIDE 25 MG: 25 TABLET ORAL at 08:42

## 2024-04-01 RX ADMIN — MIDODRINE HYDROCHLORIDE 5 MG: 5 TABLET ORAL at 09:00

## 2024-04-01 RX ADMIN — CEFAZOLIN 2000 MG: 2 INJECTION, POWDER, FOR SOLUTION INTRAMUSCULAR; INTRAVENOUS at 06:55

## 2024-04-01 RX ADMIN — SERTRALINE HYDROCHLORIDE 50 MG: 50 TABLET ORAL at 20:11

## 2024-04-01 RX ADMIN — MICONAZOLE NITRATE: 2 POWDER TOPICAL at 08:56

## 2024-04-01 RX ADMIN — INSULIN LISPRO 4 UNITS: 100 INJECTION, SOLUTION INTRAVENOUS; SUBCUTANEOUS at 20:29

## 2024-04-01 RX ADMIN — SEVELAMER CARBONATE 800 MG: 800 TABLET, FILM COATED ORAL at 20:11

## 2024-04-01 RX ADMIN — PANTOPRAZOLE SODIUM 20 MG: 20 TABLET, DELAYED RELEASE ORAL at 08:42

## 2024-04-01 RX ADMIN — ONDANSETRON 4 MG: 2 INJECTION INTRAMUSCULAR; INTRAVENOUS at 06:56

## 2024-04-01 RX ADMIN — INSULIN LISPRO 4 UNITS: 100 INJECTION, SOLUTION INTRAVENOUS; SUBCUTANEOUS at 11:52

## 2024-04-01 RX ADMIN — ATORVASTATIN CALCIUM 40 MG: 40 TABLET, FILM COATED ORAL at 20:11

## 2024-04-01 RX ADMIN — INSULIN LISPRO 4 UNITS: 100 INJECTION, SOLUTION INTRAVENOUS; SUBCUTANEOUS at 08:43

## 2024-04-01 RX ADMIN — ALBUTEROL SULFATE 2.5 MG: 2.5 SOLUTION RESPIRATORY (INHALATION) at 03:49

## 2024-04-01 RX ADMIN — CEFAZOLIN 2000 MG: 2 INJECTION, POWDER, FOR SOLUTION INTRAMUSCULAR; INTRAVENOUS at 17:52

## 2024-04-01 RX ADMIN — MIDODRINE HYDROCHLORIDE 15 MG: 10 TABLET ORAL at 16:38

## 2024-04-01 RX ADMIN — HYDROXYZINE HYDROCHLORIDE 25 MG: 25 TABLET ORAL at 20:14

## 2024-04-01 RX ADMIN — MICONAZOLE NITRATE: 2 POWDER TOPICAL at 20:13

## 2024-04-01 RX ADMIN — SODIUM CHLORIDE, PRESERVATIVE FREE 10 ML: 5 INJECTION INTRAVENOUS at 08:44

## 2024-04-01 RX ADMIN — SEVELAMER CARBONATE 800 MG: 800 TABLET, FILM COATED ORAL at 14:27

## 2024-04-01 RX ADMIN — ARIPIPRAZOLE 5 MG: 5 TABLET ORAL at 08:42

## 2024-04-01 RX ADMIN — MIDODRINE HYDROCHLORIDE 10 MG: 10 TABLET ORAL at 08:43

## 2024-04-01 RX ADMIN — INSULIN LISPRO 4 UNITS: 100 INJECTION, SOLUTION INTRAVENOUS; SUBCUTANEOUS at 16:38

## 2024-04-01 RX ADMIN — SODIUM CHLORIDE, PRESERVATIVE FREE 10 ML: 5 INJECTION INTRAVENOUS at 20:14

## 2024-04-01 RX ADMIN — ASPIRIN 81 MG: 81 TABLET, COATED ORAL at 08:42

## 2024-04-01 RX ADMIN — MUPIROCIN: 20 OINTMENT TOPICAL at 15:39

## 2024-04-01 RX ADMIN — MUPIROCIN: 20 OINTMENT TOPICAL at 20:12

## 2024-04-01 ASSESSMENT — PAIN SCALES - GENERAL
PAINLEVEL_OUTOF10: 0

## 2024-04-01 NOTE — PROGRESS NOTES
Spiritual Care Services     Summary of Visit:      Participated in ICU team rounding's, Pt was alert and oriented,  patient has HCPOA in Epic, no family present, On going spiritual health care as needed     Encounter Summary  Encounter Overview/Reason : Interdisciplinary rounds  Service Provided For:: Patient  Referral/Consult From:: Estephania, Multi-disciplinary team  Support System: Children  Complexity of Encounter: Low  Begin Time: 0915  End Time : 0925  Total Time Calculated: 10 min        Spiritual/Emotional needs  Type: Spiritual Support, Other (comment)                      Spiritual Assessment/Intervention/Outcomes:    Assessment: Concerns with suffering    Intervention: Sustaining Presence/Ministry of presence    Outcome: Comfort, Receptive      Care Plan:    Plan and Referrals  Plan/Referrals: Continue to visit, (comment)          Spiritual Care Services   Electronically signed by Chaplain Galen on 4/1/2024 at 11:31 AM.    To reach a  for emotional and spiritual support, place an EPIC consult request.   If a  is needed immediately, dial “0” and ask to page the on-call .

## 2024-04-01 NOTE — CARE COORDINATION
Quality round completed with care management team. Pt is alert and oriented x 4. Pt is from [facility] for skilled therapy. Discussed DC plan with pt. Pt would like to return to [facility] at DC. Denies any needs.   AFUAW updated Annie at [facility].   KOV following. No pre-cert is need it to return.     LSW will follow.     Electronically signed by HIPOLITO Rendon on 4/1/2024 at 9:48 AM

## 2024-04-01 NOTE — FLOWSHEET NOTE
0719 Assessment complete see flow sheet. Patient A/O X4 following all commands. Eating breakfast without complaints at present time.  1115 Having dialysis at present BP low, Levo restarted, see MAR for titration.  1300 No changes in assessment noted.  1715 Patient repositioned, bedpad changed, kerry care done. No changes in assessment noted. Uneventful shift.

## 2024-04-01 NOTE — PROGRESS NOTES
PHARMACY NOTE:   ICU Rounds Attended (10-15 minutes in patient room):    Pt diagnosis: fluid overload    IV Fluids: none   Renal:   Recent Labs     03/30/24  0543 03/31/24  0402 04/01/24  0412   CREATININE 6.11* 4.61* 5.56*    Estimated Creatinine Clearance: 12 mL/min (A) (based on SCr of 5.56 mg/dL (H)).        Antimicrobial Therapy:   Day 2/7   Antimicrobial agents: ancef  Cultures mrsa nasal swab pending   ID on consult: yes    Recent Labs     03/29/24  1045 03/30/24  0543 03/31/24  0402 04/01/24  0412   WBC 9.0 9.5 12.2* 8.9         Pressors:   norepinephrine OFF    Insulin Therapy (goal: 140-180): medium SSI  6 units given past 24 hours     Recent Labs     03/30/24  0543 03/31/24  0402 04/01/24  0412   GLUCOSE 239* 234* 272*       Stress Ulcer Prophylaxis:   Pantoprazole    DVT Prophylaxis/Anticoagulant Therapy: eliquis held for HD cath  Recent Labs     03/30/24  0543 03/31/24  0402 04/01/24  0412    186 132     Recent Labs     03/29/24  1045   INR 1.8         Bowel Regimen:   none      Follow up/Changes:   -keep levo on profile possible need during HD session  -possible need for CRRT will monitor  -change to high dose SSI per verbal on rounds  -follow up resume eliquis post HD cath  -follow up bowel meds as needed  -hold lokemla per verbal on rounds K 4.7 today   -home meds reviewed/ reordered  -follow up bowel agents as needed    Leti Freeman, [facility] PharmD

## 2024-04-01 NOTE — DIALYSIS
Patient tolerated treatment well with the use of BP supporting medications. AVF functioned without issue. Patient achieved hemostasis within 10 minutes. 3000ml total net UF removed. Primary RN received report.

## 2024-04-01 NOTE — PROGRESS NOTES
Nephrology Progress Note    Assessment:  Hypotension etilogy? EPi might be better   ESRDX  CAD  Schizophrenia  HFmrEF  DM type-2  Hx Seizures      Plan: dialysis today  may need CRRT  would consider EPI vs norepi  cortrsyn stim test consider    Patient Active Problem List:     Coronary artery disease involving native coronary artery of native heart with angina pectoris (HCC)     Schizophrenia, paranoid, chronic     Metabolic syndrome     Vitamin B 12 deficiency     Cerebral microvascular disease     Mixed hyperlipidemia     Other hammer toe (acquired)     Vitamin D insufficiency     Incontinence of urine     Diabetic nephropathy with proteinuria (HCC)     Essential (primary) hypertension     History of type C viral hepatitis     Urinary incontinence due to cognitive impairment     History of seizures     Stented coronary artery-plan is to stay on Plavix indefinately per Dr Walker     Diabetes mellitus ([facility])     Controlled type 2 diabetes mellitus with diabetic neuropathy, with long-term current use of insulin ([facility])     Hemiparesis, left ([facility])     Angina, class II ([facility])     Pain, unspecified     Tardive dyskinesia     Shortness of breath     Uncontrolled type 2 diabetes mellitus with hyperglycemia ([facility])     Chronic diastolic congestive heart failure ([facility])     [place] (obstructive sleep apnea)     Pulmonary hypertension, unspecified ([facility])     Class 2 severe obesity with serious comorbidity and body mass index (BMI) of 36.0 to 36.9 in adult ([facility])     Edema     Closed supracondylar fracture of right humerus     Other chronic pain     Palliative care patient     Recurrent falls     Renal failure     Difficulty in walking     ESRD (end stage renal disease) on dialysis (HCC)     Weakness     Other seizures ([facility])     Moderate persistent asthma without complication     COVID-19     Post PTCA     Falls frequently     Chest wall contusion, left, initial encounter     Headache, unspecified     Paranoid schizophrenia ([facility])       (LMP Unknown)   SpO2 99%   BMI 35.39 kg/m²    Wt Readings from Last 3 Encounters:   04/01/24 102.5 kg (225 lb 15.5 oz)   03/09/24 99.2 kg (218 lb 11.1 oz)   02/26/24 98 kg (216 lb)      24HR INTAKE/OUTPUT:    Intake/Output Summary (Last 24 hours) at 4/1/2024 0854  Last data filed at 4/1/2024 0518  Gross per 24 hour   Intake 121.34 ml   Output --   Net 121.34 ml       General: alert, in no apparent distress  HEENT: normocephalic, atraumatic, anicteric  Neck: supple, no mass  Lungs: non-labored respirations, clear to auscultation bilaterally  Heart: regular rate and rhythm, no murmurs or rubs  Abdomen: soft, non-tender, non-distended  Ext: no cyanosis, 2+ peripheral edema  Neuro: alert and oriented, no gross abnormalities  Psych: normal mood and affect  Skin: no rash      Electronically signed by Jaden Finch DO

## 2024-04-01 NOTE — PROGRESS NOTES
Palliative Care Progress Note  Patient: Michelle Le  Gender: female  YOB: 1960  Unit/Bed: IC05/IC05-01  CodeStatus: Full Code  Inpatient Treatment Team: Treatment Team: Attending Provider: Adali Cintron MD; Consulting Physician: Jorge Puentes MD; Consulting Physician: David Beltrán MD; Utilization Reviewer: Della Mock RN; Consulting Physician: Tiny Suresh DO; Registered Nurse: Stephani rTujillo RN; Registered Nurse: Lia Isaac RN; Consulting Physician: Gabriella Almazan MD  Admit Date:  3/29/2024    Chief Complaint: Shortness of breath    History of Presenting Illness:      Michelle Le is a 64 y.o. female on hospital day 3 with a history of ESRD dialysis TTHS, HTN, afib on Eliquis, HLD, orhtostatic hypotension, DM, Depression, anxiety, CAD prior PCI to LAD, CABG, tricuspid valve repair complicated by tamponade and pericardial window.    Patient was brought to the emergency room with fluid overload and SOB. Patient was admitted for fluid overload. Patient recently hospitalized 3/5-3/9 for generalized weakness and falls. She was living with her daughter before that admission but had been discharged to [facility] for rehab.     Palliative care was consulted for end stage disease.      Patient normally lives at home with daughter.  However has been at [facility] for rehab since previous admission.  Patient reports she is up with a walker.  Patient denies any weight loss, appetite changes, nausea.    Upon entering room patient is laying in bed she is alert and oriented x 4.  Patient denies any pain, nausea, fever, chills, fatigue.  Patient has complaints of shortness of breath.  Patient is on 5 L nasal cannula.  Patient reports she normally does not wear oxygen.  Patient had dialysis yesterday.  Per nurse patient to have dialysis again tomorrow.    I discussed goals of care and CODE STATUS with patient at bedside.  Patient has power of  who is her daughter.  CODE STATUS is discussed

## 2024-04-01 NOTE — PROGRESS NOTES
Critical Care Progress Note    2024 8:05 AM    Subjective:   Admit Date: 3/29/2024  PCP: Adilene Terry MD    Chief Complaint   Patient presents with    Shortness of Breath     Sob poss. Due to fluid overload     Interval History: Levophed has been turned off during the night but then was placed back on a low-dose.  Doing better overall.  No fever overnight.  Currently on 2 L of oxygen.        Medications:   Scheduled Meds:   ceFAZolin  2,000 mg IntraVENous Q12H    sodium zirconium cyclosilicate  10 g Oral Daily    hydrOXYzine HCl  25 mg Oral BID    midodrine  10 mg Oral TID WC    sodium chloride flush  5-40 mL IntraVENous 2 times per day    ARIPiprazole  5 mg Oral Daily    atorvastatin  40 mg Oral Nightly    pantoprazole  20 mg Oral Daily    sevelamer  800 mg Oral TID    insulin lispro  0-8 Units SubCUTAneous TID WC    insulin lispro  0-4 Units SubCUTAneous Nightly    [Held by provider] apixaban  2.5 mg Oral BID    aspirin EC  81 mg Oral Daily    sertraline  50 mg Oral Nightly    miconazole   Topical BID    albuterol  2.5 mg Nebulization BID RT     Continuous Infusions:   norepinephrine Stopped (24 0354)    sodium chloride Stopped (24 1524)    dextrose           Objective:   Vitals:   Temp (24hrs), Av °F (36.7 °C), Min:97.9 °F (36.6 °C), Max:98.2 °F (36.8 °C)    BP (!) 110/55   Pulse 84   Temp 98.1 °F (36.7 °C) (Oral)   Resp 13   Ht 1.702 m (5' 7\")   Wt 102.5 kg (225 lb 15.5 oz)   LMP  (LMP Unknown)   SpO2 99%   BMI 35.39 kg/m²   I/O:24HR INTAKE/OUTPUT:    Intake/Output Summary (Last 24 hours) at 2024 0805  Last data filed at 2024 0518  Gross per 24 hour   Intake 1160.15 ml   Output --   Net 1160.15 ml      0701 -  0700  In: 1160.2 [I.V.:327.6]  Out: -   CVP:                  Physical Exam:  General appearance - alert, ill appearing, and in mild distress  Mental status - alert, oriented to person, place, and time  Eyes - pupils equal and reactive, bruises around her

## 2024-04-01 NOTE — PROGRESS NOTES
SNF PROGRESS NOTE      Cc- fall         Patient is a Michelle Le 64 y.o. female who is being seen at [facility] s/p admit for a fall. Initially she was hypotensive and required pressors in the ICU.  She continues to get hemodialysis. Her BP medications were adjusted.          Patient is sitting in her chair. She continues to complain of increased fluid in her legs. Her weight is up. She states that she is following her fluid restriction. She denies any SOB.         Past Medical History:   Diagnosis Date    Angina at rest 5/24/2020    Anxiety     CAD S/P percutaneous coronary angioplasty 2015, 2018    stents per Huong Sanabria    CHF (congestive heart failure) ([facility])     CKD (chronic kidney disease) stage 4, GFR 15-29 ml/min ([facility]) 2/24/2018    CKD stage 4 due to type 2 diabetes mellitus ([facility])     Contusion of right chest wall 2/16/2021    COPD (chronic obstructive pulmonary disease) ([facility])     Diabetic nephropathy with proteinuria ([facility]) 2014    DJD (degenerative joint disease) of knee     Dr Sharma    GERD (gastroesophageal reflux disease)     Hemiparesis, left ([facility]) 2013    entered Assisted Living ([place])    Hemodialysis patient ([facility])     Hemodialysis-associated hypotension 10/22/2021    History of heart failure     History of seizures     History of type C viral hepatitis     HTN (hypertension)     Hyperlipidemia     Impaired mobility and activities of daily living     Infection of venous access port 7/29/2022    Mediastinal lymphadenopathy 2013    Sangeeta Cummings    Metabolic syndrome     Moderate persistent asthma without complication 9/23/2020    Need for extended care facility 7/7/2021    Neurogenic urinary incontinence 2013    Neuropathy in diabetes ([facility])     Nonrheumatic mitral valve regurgitation 7/7/2021    Nonrheumatic tricuspid valve regurgitation 7/7/2021    Obesity (BMI 30-39.9)     Recurrent UTI     S/P colonoscopy 2014    [facility], focal

## 2024-04-01 NOTE — PLAN OF CARE
Problem: Discharge Planning  Goal: Discharge to home or other facility with appropriate resources  Outcome: Progressing  Flowsheets (Taken 3/31/2024 2000)  Discharge to home or other facility with appropriate resources: Identify barriers to discharge with patient and caregiver     Problem: Safety - Adult  Goal: Free from fall injury  Outcome: Progressing     Problem: Skin/Tissue Integrity  Goal: Absence of new skin breakdown  Description: 1.  Monitor for areas of redness and/or skin breakdown  2.  Assess vascular access sites hourly  3.  Every 4-6 hours minimum:  Change oxygen saturation probe site  4.  Every 4-6 hours:  If on nasal continuous positive airway pressure, respiratory therapy assess nares and determine need for appliance change or resting period.  Outcome: Progressing     Problem: Chronic Conditions and Co-morbidities  Goal: Patient's chronic conditions and co-morbidity symptoms are monitored and maintained or improved  Outcome: Progressing  Flowsheets (Taken 3/31/2024 2000)  Care Plan - Patient's Chronic Conditions and Co-Morbidity Symptoms are Monitored and Maintained or Improved: Monitor and assess patient's chronic conditions and comorbid symptoms for stability, deterioration, or improvement     Problem: Pain  Goal: Verbalizes/displays adequate comfort level or baseline comfort level  Outcome: Progressing  Flowsheets  Taken 4/1/2024 0000  Verbalizes/displays adequate comfort level or baseline comfort level:   Encourage patient to monitor pain and request assistance   Assess pain using appropriate pain scale  Taken 3/31/2024 2000  Verbalizes/displays adequate comfort level or baseline comfort level:   Encourage patient to monitor pain and request assistance   Assess pain using appropriate pain scale

## 2024-04-01 NOTE — PROGRESS NOTES
Cardiology progress note    Patient Name: Michelle Le  Admit Date: 3/29/2024 10:23 AM  MR #: [id number]  : 1960    Attending Physician: Adali Cintron MD  Reason for consult: Hypotension    History of Presenting Illness:      Michelle Le is a 64 y.o. female on hospital day 3 with a history of CAD prior PCI to the LAD, CABG LIMA to LAD and tricuspid valve repair complicated by tamponade and pericardial window, end-stage renal disease on hemodialysis, hypertension, hyperlipidemia, atrial fibrillation on Eliquis admitted to the hospital for shortness of breath. History Obtained From:  patient, electronic medical record    Patient hyperkalemic on admission 6.4  EKG atrial flutter 58 bpm  Troponins flat 130  =================  Hospital course  3/31/2024  Patient now in ICU due to hypotension  Currently patient on Levophed  Patient looks comfortable denies chest pain or shortness of breath  Still hyperkalemic but condition improving    2024: Patient is resting comfortably in bed and appears to be in no acute distress at this time.  She is on room air.  She is talking to me comfortably without any distress.  Patient went for dialysis today, levo was started during dialysis.  Patient worried about her blood pressures.  Patient denies any chest pain, or other anginal type symptoms.  Reports that her shortness of breath is at baseline and feels a little dizzy during time of examination.  Creatinine 5.56, GFR 7.9, hemoglobin 12.4, potassium within normal range  Chest x-ray from [facility] showed no right or left pleural effusion  Echo from 3/6/2024 showed EF of 55 to 60%.    History:      Past Medical History:   Diagnosis Date    Angina at rest 2020    Anxiety     CAD S/P percutaneous coronary angioplasty ,     stents per Huong Sanabria    CHF (congestive heart failure) ([facility])     CKD (chronic kidney disease) stage 4, GFR 15-29 ml/min ([facility]) 2018    CKD stage 4 due to type 2 diabetes

## 2024-04-01 NOTE — PROGRESS NOTES
Progress Note  Date:2024       [facility]  Patient Name:Michelle Le     YOB: 1960     Age:64 y.o.        Subjective    Subjective:  Symptoms:  She reports weakness.  No shortness of breath, malaise, cough, chest pain, headache, chest pressure, anorexia, diarrhea or anxiety.    Diet:  No nausea or vomiting.       Review of Systems   Respiratory:  Negative for cough and shortness of breath.    Cardiovascular:  Negative for chest pain.   Gastrointestinal:  Negative for anorexia, diarrhea, nausea and vomiting.   Neurological:  Positive for weakness.     Objective         Vitals Last 24 Hours:  TEMPERATURE:  Temp  Av °F (36.7 °C)  Min: 97.9 °F (36.6 °C)  Max: 98.2 °F (36.8 °C)  RESPIRATIONS RANGE: Resp  Av.7  Min: 12  Max: 38  PULSE OXIMETRY RANGE: SpO2  Av.7 %  Min: 92 %  Max: 100 %  PULSE RANGE: Pulse  Av.5  Min: 62  Max: 95  BLOOD PRESSURE RANGE: Systolic (24hrs), Av , Min:85 , Max:142   ; Diastolic (24hrs), Av, Min:31, Max:69    I/O (24Hr):    Intake/Output Summary (Last 24 hours) at 2024 1028  Last data filed at 2024 0518  Gross per 24 hour   Intake 121.34 ml   Output --   Net 121.34 ml       Objective:  General Appearance:  In no acute distress.    Vital signs: (most recent): Blood pressure (!) 110/55, pulse 84, temperature 98.1 °F (36.7 °C), temperature source Oral, resp. rate 13, height 1.702 m (5' 7\"), weight 102.5 kg (225 lb 15.5 oz), SpO2 99 %, not currently breastfeeding.    HEENT: Normal HEENT exam.    Lungs:  There are decreased breath sounds.    Heart: S1 normal and S2 normal.  No gallop.   Abdomen: Abdomen is soft.  Bowel sounds are normal.   There is no epigastric area or suprapubic area tenderness.     Extremities: There is no deformity.    Neurological: Patient is alert and oriented to person, place and time.    Pupils:  Pupils are equal, round, and reactive to light.    Skin:  Warm.      Labs/Imaging/Diagnostics    Labs:  CBC:  Recent  3/29/2024 Yes    Palliative care encounter 3/29/2024 Yes   Luid overload  ESRD  Chronic hypotension  CAD    Assessment & Plan  3/30: Patient getting dialysis for possible 1 L fluid removal.  Continue midodrine.  Spoke with nursing.  Patient is asymptomatic.  Follow cardiology.  No overnight events.  Spoke with nursing, hold BP meds such as lopressors for now, remove fluid as much tolerated TCT 35 min  3/31: pt started on abx by nocturnist, dont recommend doing would cultures, since it will show contamination and false positive results on non infected wound, cw wound care , c/w HD, spoke with nursing, taper down pressors as tolerated, c/w midodrine TCT 35 min  4/1: Patient was seen and evaluated.  Off pressors since 5:00 in the morning. Pt will get  dialysis today.  No overnight events.  Feeling better.  Spoke with nursing about the care.  Continue wound care. TCT 35 min  Electronically signed by Adali Cintron MD on 3/30/24 at 11:25 AM EDT     diminished

## 2024-04-01 NOTE — PROGRESS NOTES
1930: assumed care of pt at RN shift change after receiving bedside report from FLAQUITO Lion. Pt in bed, off levophed at this time. VSS. Pt with no current questions or concerns, white board updated, call light within reach, side rail up.  2030: shift assessment completed. Pt AO4, resps even and unlabored, diminished and slight crackles heard at bases. RT bedside for neb tx. Pt reports continued nausea throughout day, but states Zofran has been working  2200: After multiple BP readings with MAP's <60, levophed restarted at ordered initial rate.  0000: reassessment completed, no changes. Pt given CHG complete bath, all linens changed. Pt given shampoo cap. Pt satisfied with care.   0300: Levophed paused  0400: Pt maintained MAP > 60 for last hour while medication was on delay. Pump shut off at this time. Pt reassessed no changes at this time  0530: Levophed restarted   0600: weight obtained at 102.5 kg. Pt current chart reported much less. Pt asked what her weight usually is and she reporrted 230, which is in relation to current bed scale weight. She stated that previously in chart was not right and she does not know where that info was obtained.

## 2024-04-01 NOTE — PROGRESS NOTES
Wound Ostomy Continence Nurse  Consult Note       NAME:  Michelle Le  MEDICAL RECORD NUMBER:  [id number]  AGE: 64 y.o.   GENDER: female  : 1960  TODAY'S DATE:  2024    Subjective   Reason for [facility] Nurse Evaluation and Assessment: Bilateral Upper extremities       Michelle Le is a 64 y.o. female referred by:   [x] Physician  [] Nursing  [] Other:     Wound Identification:  Wound Type: traumatic  Contributing Factors:  self-inflicted     Wound History: Patient admitted to [facility] with traumatic wounds to bilateral upper extremities and hands from picking and scratching at her skin.  Current Wound Care Treatment: Recommending 1) continue pressure injury prevention interventions 2) Topical antibiotic ointment to upper extremities wounds, apply daily with cover dressing to keep patient from picking. Patient educated on not touching or picking the open areas - not receptive as she continued to touch the open areas thoroughout this encounter    Patient Goal of Care:  [x] Wound Healing  [] Odor Control  [] Palliative Care  [] Pain Control   [] Other:         PAST MEDICAL HISTORY        Diagnosis Date    Angina at rest 2020    Anxiety     CAD S/P percutaneous coronary angioplasty ,     stents per Huong Sanabria    CHF (congestive heart failure) ([facility])     CKD (chronic kidney disease) stage 4, GFR 15-29 ml/min ([facility]) 2018    CKD stage 4 due to type 2 diabetes mellitus ([facility])     Contusion of right chest wall 2021    COPD (chronic obstructive pulmonary disease) ([facility])     Diabetic nephropathy with proteinuria ([facility])     DJD (degenerative joint disease) of knee     Dr Sharma    GERD (gastroesophageal reflux disease)     Hemiparesis, left ([facility])     entered Assisted Living ([place])    Hemodialysis patient ([facility])     Hemodialysis-associated hypotension 10/22/2021    History of heart failure     History of seizures     History of type C viral hepatitis   drainage. The left dorsal hand has some erythema present around the traumatic wounds, no induration and no fluctuance noted.    Plan   Plan of Care:  see above    Specialty Bed Required : N/A   [] Low Air Loss   [] Pressure Redistribution  [] Fluid Immersion  [] Bariatric  [] Other:     Current Diet: ADULT DIET; Regular; Low Potassium (Less than 3000 mg/day); Low Phosphorus (Less than 1000 mg)  Dietician consult:  Yes    Discharge Plan:  Placement for patient upon discharge: skilled nursing    Patient appropriate for Outpatient Wound Care Center: N/A    Referrals:  []   [] Home Health Care  [] Supplies  [] Other    Patient/Caregiver Teaching:  Level of patient/caregiver understanding able to:   [] Indicates understanding       [] Needs reinforcement  [] Unsuccessful      [] Verbal Understanding  [] Demonstrated understanding       [] No evidence of learning  [] Refused teaching         [x] N/A       Electronically signed by ADALID MoratayaN, RN, CWOCN on 4/1/2024 at 2:02 PM

## 2024-04-02 PROBLEM — Z96.659 CHRONIC INFECTION OF PROSTHETIC KNEE (HCC): Status: ACTIVE | Noted: 2024-04-02

## 2024-04-02 PROBLEM — F42.4 SKIN PICKING HABIT: Status: ACTIVE | Noted: 2024-04-02

## 2024-04-02 PROBLEM — K59.00 CONSTIPATION: Status: ACTIVE | Noted: 2024-04-02

## 2024-04-02 PROBLEM — T84.59XA CHRONIC INFECTION OF PROSTHETIC KNEE (HCC): Status: ACTIVE | Noted: 2024-04-02

## 2024-04-02 PROBLEM — T14.8XXA NONHEALING NONSURGICAL WOUND: Status: ACTIVE | Noted: 2024-04-02

## 2024-04-02 PROBLEM — I95.9 HYPOTENSION: Status: ACTIVE | Noted: 2024-04-02

## 2024-04-02 PROBLEM — Z79.2 CHRONIC ANTIBIOTIC SUPPRESSION: Status: ACTIVE | Noted: 2024-04-02

## 2024-04-02 PROBLEM — I50.9 ACUTE DECOMPENSATED HEART FAILURE (HCC): Status: ACTIVE | Noted: 2024-04-02

## 2024-04-02 PROBLEM — Z99.2 ESRD ON DIALYSIS (HCC): Status: ACTIVE | Noted: 2024-04-02

## 2024-04-02 PROBLEM — N18.6 ESRD ON DIALYSIS (HCC): Status: ACTIVE | Noted: 2024-04-02

## 2024-04-02 LAB
ANION GAP SERPL CALCULATED.3IONS-SCNC: 18 MEQ/L (ref 9–15)
BUN SERPL-MCNC: 27 MG/DL (ref 8–23)
CALCIUM SERPL-MCNC: 8.6 MG/DL (ref 8.5–9.9)
CHLORIDE SERPL-SCNC: 90 MEQ/L (ref 95–107)
CO2 SERPL-SCNC: 22 MEQ/L (ref 20–31)
CREAT SERPL-MCNC: 4.6 MG/DL (ref 0.5–0.9)
EKG ATRIAL RATE: 92 BPM
EKG P-R INTERVAL: 168 MS
EKG Q-T INTERVAL: 400 MS
EKG QRS DURATION: 136 MS
EKG QTC CALCULATION (BAZETT): 494 MS
EKG R AXIS: -68 DEGREES
EKG T AXIS: 117 DEGREES
EKG VENTRICULAR RATE: 92 BPM
GLUCOSE BLD-MCNC: 173 MG/DL (ref 70–99)
GLUCOSE BLD-MCNC: 272 MG/DL (ref 70–99)
GLUCOSE BLD-MCNC: 318 MG/DL (ref 70–99)
GLUCOSE BLD-MCNC: 343 MG/DL (ref 70–99)
GLUCOSE SERPL-MCNC: 314 MG/DL (ref 70–99)
PERFORMED ON: ABNORMAL
POTASSIUM SERPL-SCNC: 5.3 MEQ/L (ref 3.4–4.9)
SODIUM SERPL-SCNC: 130 MEQ/L (ref 135–144)

## 2024-04-02 PROCEDURE — 80048 BASIC METABOLIC PNL TOTAL CA: CPT

## 2024-04-02 PROCEDURE — 99232 SBSQ HOSP IP/OBS MODERATE 35: CPT | Performed by: INTERNAL MEDICINE

## 2024-04-02 PROCEDURE — 99291 CRITICAL CARE FIRST HOUR: CPT | Performed by: INTERNAL MEDICINE

## 2024-04-02 PROCEDURE — 2580000003 HC RX 258: Performed by: INTERNAL MEDICINE

## 2024-04-02 PROCEDURE — 6370000000 HC RX 637 (ALT 250 FOR IP): Performed by: INTERNAL MEDICINE

## 2024-04-02 PROCEDURE — 2000000000 HC ICU R&B

## 2024-04-02 PROCEDURE — 6360000002 HC RX W HCPCS: Performed by: INTERNAL MEDICINE

## 2024-04-02 PROCEDURE — 2700000000 HC OXYGEN THERAPY PER DAY

## 2024-04-02 PROCEDURE — 94761 N-INVAS EAR/PLS OXIMETRY MLT: CPT

## 2024-04-02 PROCEDURE — 99232 SBSQ HOSP IP/OBS MODERATE 35: CPT | Performed by: NURSE PRACTITIONER

## 2024-04-02 PROCEDURE — 2500000003 HC RX 250 WO HCPCS: Performed by: INTERNAL MEDICINE

## 2024-04-02 PROCEDURE — 94640 AIRWAY INHALATION TREATMENT: CPT

## 2024-04-02 PROCEDURE — 36415 COLL VENOUS BLD VENIPUNCTURE: CPT

## 2024-04-02 PROCEDURE — 93010 ELECTROCARDIOGRAM REPORT: CPT | Performed by: INTERNAL MEDICINE

## 2024-04-02 RX ORDER — MIDODRINE HYDROCHLORIDE 10 MG/1
20 TABLET ORAL
Status: DISCONTINUED | OUTPATIENT
Start: 2024-04-02 | End: 2024-04-12 | Stop reason: HOSPADM

## 2024-04-02 RX ORDER — SENNA AND DOCUSATE SODIUM 50; 8.6 MG/1; MG/1
2 TABLET, FILM COATED ORAL 2 TIMES DAILY PRN
Status: DISCONTINUED | OUTPATIENT
Start: 2024-04-02 | End: 2024-04-08

## 2024-04-02 RX ADMIN — MIDODRINE HYDROCHLORIDE 20 MG: 10 TABLET ORAL at 16:37

## 2024-04-02 RX ADMIN — SERTRALINE HYDROCHLORIDE 50 MG: 50 TABLET ORAL at 21:22

## 2024-04-02 RX ADMIN — MUPIROCIN: 20 OINTMENT TOPICAL at 21:22

## 2024-04-02 RX ADMIN — INSULIN LISPRO 12 UNITS: 100 INJECTION, SOLUTION INTRAVENOUS; SUBCUTANEOUS at 09:02

## 2024-04-02 RX ADMIN — MICONAZOLE NITRATE: 2 POWDER TOPICAL at 21:24

## 2024-04-02 RX ADMIN — ALBUTEROL SULFATE 2.5 MG: 2.5 SOLUTION RESPIRATORY (INHALATION) at 15:50

## 2024-04-02 RX ADMIN — SODIUM CHLORIDE, PRESERVATIVE FREE 10 ML: 5 INJECTION INTRAVENOUS at 09:05

## 2024-04-02 RX ADMIN — MUPIROCIN: 20 OINTMENT TOPICAL at 14:00

## 2024-04-02 RX ADMIN — Medication 12 MCG/MIN: at 04:36

## 2024-04-02 RX ADMIN — MIDODRINE HYDROCHLORIDE 20 MG: 10 TABLET ORAL at 11:48

## 2024-04-02 RX ADMIN — CEFAZOLIN 2000 MG: 2 INJECTION, POWDER, FOR SOLUTION INTRAMUSCULAR; INTRAVENOUS at 17:44

## 2024-04-02 RX ADMIN — INSULIN LISPRO 12 UNITS: 100 INJECTION, SOLUTION INTRAVENOUS; SUBCUTANEOUS at 16:36

## 2024-04-02 RX ADMIN — SEVELAMER CARBONATE 800 MG: 800 TABLET, FILM COATED ORAL at 09:05

## 2024-04-02 RX ADMIN — MIDODRINE HYDROCHLORIDE 20 MG: 10 TABLET ORAL at 09:05

## 2024-04-02 RX ADMIN — ARIPIPRAZOLE 5 MG: 5 TABLET ORAL at 09:05

## 2024-04-02 RX ADMIN — MICONAZOLE NITRATE: 2 POWDER TOPICAL at 09:03

## 2024-04-02 RX ADMIN — SODIUM CHLORIDE, PRESERVATIVE FREE 10 ML: 5 INJECTION INTRAVENOUS at 21:24

## 2024-04-02 RX ADMIN — ASPIRIN 81 MG: 81 TABLET, COATED ORAL at 09:06

## 2024-04-02 RX ADMIN — ONDANSETRON 4 MG: 2 INJECTION INTRAMUSCULAR; INTRAVENOUS at 16:37

## 2024-04-02 RX ADMIN — HYDROXYZINE HYDROCHLORIDE 25 MG: 25 TABLET ORAL at 09:05

## 2024-04-02 RX ADMIN — ALBUTEROL SULFATE 2.5 MG: 2.5 SOLUTION RESPIRATORY (INHALATION) at 03:58

## 2024-04-02 RX ADMIN — SEVELAMER CARBONATE 800 MG: 800 TABLET, FILM COATED ORAL at 21:22

## 2024-04-02 RX ADMIN — CEFAZOLIN 2000 MG: 2 INJECTION, POWDER, FOR SOLUTION INTRAMUSCULAR; INTRAVENOUS at 06:48

## 2024-04-02 RX ADMIN — PANTOPRAZOLE SODIUM 20 MG: 20 TABLET, DELAYED RELEASE ORAL at 09:05

## 2024-04-02 RX ADMIN — SEVELAMER CARBONATE 800 MG: 800 TABLET, FILM COATED ORAL at 13:45

## 2024-04-02 RX ADMIN — HYDROXYZINE HYDROCHLORIDE 25 MG: 25 TABLET ORAL at 21:22

## 2024-04-02 RX ADMIN — ATORVASTATIN CALCIUM 40 MG: 40 TABLET, FILM COATED ORAL at 21:22

## 2024-04-02 ASSESSMENT — PAIN SCALES - GENERAL
PAINLEVEL_OUTOF10: 0

## 2024-04-02 NOTE — CARE COORDINATION
Team ICU rounds done this am at bedside. Pt is on IV Levo and continues to require ICU care and monitoring. She is getting hemodialysis at present  and Pall care met with her. Pt plan is return to [facility] once medically cleared.

## 2024-04-02 NOTE — PLAN OF CARE
Problem: Discharge Planning  Goal: Discharge to home or other facility with appropriate resources  Outcome: Progressing  Flowsheets (Taken 4/1/2024 2000)  Discharge to home or other facility with appropriate resources:   Identify barriers to discharge with patient and caregiver   Arrange for needed discharge resources and transportation as appropriate   Identify discharge learning needs (meds, wound care, etc)   Arrange for interpreters to assist at discharge as needed   Refer to discharge planning if patient needs post-hospital services based on physician order or complex needs related to functional status, cognitive ability or social support system     Problem: Safety - Adult  Goal: Free from fall injury  Outcome: Progressing     Problem: Skin/Tissue Integrity  Goal: Absence of new skin breakdown  Description: 1.  Monitor for areas of redness and/or skin breakdown  2.  Assess vascular access sites hourly  3.  Every 4-6 hours minimum:  Change oxygen saturation probe site  4.  Every 4-6 hours:  If on nasal continuous positive airway pressure, respiratory therapy assess nares and determine need for appliance change or resting period.  Outcome: Progressing     Problem: Chronic Conditions and Co-morbidities  Goal: Patient's chronic conditions and co-morbidity symptoms are monitored and maintained or improved  Outcome: Progressing  Flowsheets (Taken 4/1/2024 2000)  Care Plan - Patient's Chronic Conditions and Co-Morbidity Symptoms are Monitored and Maintained or Improved:   Monitor and assess patient's chronic conditions and comorbid symptoms for stability, deterioration, or improvement   Update acute care plan with appropriate goals if chronic or comorbid symptoms are exacerbated and prevent overall improvement and discharge   Collaborate with multidisciplinary team to address chronic and comorbid conditions and prevent exacerbation or deterioration     Problem: Pain  Goal: Verbalizes/displays adequate comfort level  or baseline comfort level  Outcome: Progressing  Flowsheets  Taken 4/2/2024 0000  Verbalizes/displays adequate comfort level or baseline comfort level:   Encourage patient to monitor pain and request assistance   Assess pain using appropriate pain scale   Administer analgesics based on type and severity of pain and evaluate response   Consider cultural and social influences on pain and pain management   Implement non-pharmacological measures as appropriate and evaluate response   Notify Licensed Independent Practitioner if interventions unsuccessful or patient reports new pain  Taken 4/1/2024 2000  Verbalizes/displays adequate comfort level or baseline comfort level:   Encourage patient to monitor pain and request assistance   Assess pain using appropriate pain scale   Administer analgesics based on type and severity of pain and evaluate response   Consider cultural and social influences on pain and pain management   Implement non-pharmacological measures as appropriate and evaluate response   Notify Licensed Independent Practitioner if interventions unsuccessful or patient reports new pain

## 2024-04-02 NOTE — PROGRESS NOTES
Spiritual Care Services     Summary of Visit:  Pt receiving dialysis today. Pt is a Palliative Care patient. Able to engage in conversation, feeling okay she said. Pt is Jewish, a quiet part of her life. Pt's dtr Angelina is her POA per her AD in her EMR. No spiritual needs assessed today.     Encounter Summary  Encounter Overview/Reason : Spiritual/Emotional Needs  Service Provided For:: Patient  Referral/Consult From:: [employer]  Support System: [employer]  Last Encounter : 04/01/24  Complexity of Encounter: Moderate  Begin Time: 0930  End Time : 0945  Total Time Calculated: 15 min        Spiritual/Emotional needs  Type: Spiritual Support                      Spiritual Assessment/Intervention/Outcomes:    Assessment: Concerns with suffering    Intervention: Active listening, Discussed relationship with God, Discussed illness injury and it’s impact    Outcome: Receptive      Care Plan:    Plan and Referrals  Plan/Referrals: Continue Support (comment)      Spiritual Care Services   Electronically signed by LUKAS Rosenthal on 4/2/2024 at 2:15 PM.    To reach a  for emotional and spiritual support, place an EPIC consult request.   If a  is needed immediately, dial “0” and ask to page the on-call .

## 2024-04-02 NOTE — PROGRESS NOTES
Pt alert and oriented x4. Pt family at the bedside. Family supportive and calm. Pt rest fair. EKG completed for irregular rhythm. This RN changed dressings on both arms. Pt tolerated well. Pt SpO2 would come down into the high 80's, 2 L nasal cannula added for comfort. Levophed titrated per Orders, see MAR for details. Pt handoff given to Lia HUDDLESTON.

## 2024-04-02 NOTE — PLAN OF CARE
Problem: Discharge Planning  Goal: Discharge to home or other facility with appropriate resources  Recent Flowsheet Documentation  Taken 4/1/2024 2000 by Anuja Hester RN  Discharge to home or other facility with appropriate resources:   Identify barriers to discharge with patient and caregiver   Arrange for needed discharge resources and transportation as appropriate   Identify discharge learning needs (meds, wound care, etc)   Arrange for interpreters to assist at discharge as needed   Refer to discharge planning if patient needs post-hospital services based on physician order or complex needs related to functional status, cognitive ability or social support system     Problem: Chronic Conditions and Co-morbidities  Goal: Patient's chronic conditions and co-morbidity symptoms are monitored and maintained or improved  Recent Flowsheet Documentation  Taken 4/1/2024 2000 by Anuja Hester RN  Care Plan - Patient's Chronic Conditions and Co-Morbidity Symptoms are Monitored and Maintained or Improved:   Monitor and assess patient's chronic conditions and comorbid symptoms for stability, deterioration, or improvement   Update acute care plan with appropriate goals if chronic or comorbid symptoms are exacerbated and prevent overall improvement and discharge   Collaborate with multidisciplinary team to address chronic and comorbid conditions and prevent exacerbation or deterioration     Problem: Pain  Goal: Verbalizes/displays adequate comfort level or baseline comfort level  Recent Flowsheet Documentation  Taken 4/2/2024 0000 by Anuja Hester RN  Verbalizes/displays adequate comfort level or baseline comfort level:   Encourage patient to monitor pain and request assistance   Assess pain using appropriate pain scale   Administer analgesics based on type and severity of pain and evaluate response   Consider cultural and social influences on pain and pain management   Implement non-pharmacological measures as appropriate

## 2024-04-02 NOTE — CONSULTS
Michelle Le  1960  female  Medical Record Number: [id number]    Patient informed that I am an Infectious Disease physician and permission obtained from the patient to speak in front of any visitors prior to any discussion for HIPPA purposes.     HPI:  Patient admitted with shortness of breath.  She is a dialysis patient.  She has been picking at her skin and has multiple self-inflicted wounds.  Noted to be hypoxic with hypotension on admission  Started on cefazolin    On review of medical history, patient has history of chronic left knee osteomyelitis/prosthetic joint infection with Enterococcus faecalis and is supposed to be on chronic suppressive therapy with p.o. amoxicillin.  She also has a history of dialysis catheter related Enterococcus bacteremia.     Patient has been on and off of pressors.  No fevers.  Mild leukocytosis.  Monitoring trend.     Review of Systems: All 14 review of systems were not discussed with the patient.  Seen with nurse at bedside patient is resting    Past Medical History:   Diagnosis Date    Angina at rest ([facility]) 5/24/2020    Anxiety     CAD S/P percutaneous coronary angioplasty 2015, 2018    stents per Huong Sanabria    CHF (congestive heart failure) ([facility])     CKD (chronic kidney disease) stage 4, GFR 15-29 ml/min ([facility]) 2/24/2018    CKD stage 4 due to type 2 diabetes mellitus ([facility])     Contusion of right chest wall 2/16/2021    COPD (chronic obstructive pulmonary disease) ([facility])     Diabetic nephropathy with proteinuria ([facility]) 2014    DJD (degenerative joint disease) of knee     Dr Sharma    GERD (gastroesophageal reflux disease)     Hemiparesis, left ([facility]) 2013    entered Assisted Living ([place] [place])    Hemodialysis patient ([facility])     Hemodialysis-associated hypotension 10/22/2021    History of heart failure     History of seizures     History of type C viral hepatitis     HTN (hypertension)     Hyperlipidemia     Impaired mobility and activities of daily living

## 2024-04-02 NOTE — PROGRESS NOTES
Nephrology Progress Note    Assessment:  ESRDX  Volume overloadHx Hypertension  DM type-2  Schizophrenia  [place]  Hx Seizures      Plan: doing better ay do ulytafiltration today  BP stable levo may try to wean    Patient Active Problem List:     Coronary artery disease involving native coronary artery of native heart with angina pectoris ([facility])     Schizophrenia, paranoid, chronic     Metabolic syndrome     Vitamin B 12 deficiency     Cerebral microvascular disease     Mixed hyperlipidemia     Other hammer toe (acquired)     Vitamin D insufficiency     Incontinence of urine     Diabetic nephropathy with proteinuria (HCC)     Essential (primary) hypertension     History of type C viral hepatitis     Urinary incontinence due to cognitive impairment     History of seizures     Stented coronary artery-plan is to stay on Plavix indefinately per Dr Walker     Diabetes mellitus ([facility])     Controlled type 2 diabetes mellitus with diabetic neuropathy, with long-term current use of insulin ([facility])     Hemiparesis, left ([facility])     Angina, class II ([facility])     Pain, unspecified     Tardive dyskinesia     Shortness of breath     Uncontrolled type 2 diabetes mellitus with hyperglycemia ([facility])     Chronic diastolic congestive heart failure ([facility])     [place] (obstructive sleep apnea)     Pulmonary hypertension, unspecified ([facility])     Class 2 severe obesity with serious comorbidity and body mass index (BMI) of 36.0 to 36.9 in adult ([facility])     Edema     Closed supracondylar fracture of right humerus     Other chronic pain     Palliative care patient     Recurrent falls     Renal failure     Difficulty in walking     ESRD (end stage renal disease) on dialysis ([facility])     Weakness     Other seizures ([facility])     Moderate persistent asthma without complication     COVID-19     Post PTCA     Falls frequently     Chest wall contusion, left, initial encounter     Headache, unspecified     Paranoid schizophrenia ([facility])     Compression fracture of spine ([facility])      decompensated heart failure (HCC)      Subjective:  Admit Date: 3/29/2024    Interval History: alert no distress    Medications:  Scheduled Meds:   midodrine  15 mg Oral TID WC    insulin lispro  0-16 Units SubCUTAneous TID WC    insulin lispro  0-4 Units SubCUTAneous Nightly    mupirocin   Topical BID    ceFAZolin  2,000 mg IntraVENous Q12H    [Held by provider] sodium zirconium cyclosilicate  10 g Oral Daily    hydrOXYzine HCl  25 mg Oral BID    sodium chloride flush  5-40 mL IntraVENous 2 times per day    ARIPiprazole  5 mg Oral Daily    atorvastatin  40 mg Oral Nightly    pantoprazole  20 mg Oral Daily    sevelamer  800 mg Oral TID    [Held by provider] apixaban  2.5 mg Oral BID    aspirin EC  81 mg Oral Daily    sertraline  50 mg Oral Nightly    miconazole   Topical BID    albuterol  2.5 mg Nebulization BID RT     Continuous Infusions:   norepinephrine 12 mcg/min (04/02/24 0436)    sodium chloride Stopped (03/30/24 1524)    dextrose         CBC:   Recent Labs     03/31/24  0402 04/01/24  0412   WBC 12.2* 8.9   HGB 12.4 12.4    132     CMP:    Recent Labs     03/31/24  0402 04/01/24  0412 04/02/24  0325   * 138 130*   K 5.3* 4.7 5.3*   CL 91* 93* 90*   CO2 22 24 22   BUN 33* 46* 27*   CREATININE 4.61* 5.56* 4.60*   GLUCOSE 234* 272* 314*   CALCIUM 8.5 8.7 8.6   LABGLOM 9.9* 7.9* 9.9*     Troponin: No results for input(s): \"TROPONINI\" in the last 72 hours.  BNP: No results for input(s): \"BNP\" in the last 72 hours.  INR: No results for input(s): \"INR\" in the last 72 hours.  Lipids: No results for input(s): \"CHOL\", \"LDLDIRECT\", \"TRIG\", \"HDL\", \"AMYLASE\", \"LIPASE\" in the last 72 hours.  Liver: No results for input(s): \"AST\", \"ALT\", \"ALKPHOS\", \"PROT\", \"LABALBU\", \"BILITOT\" in the last 72 hours.    Invalid input(s): \"BILDIR\"  Iron:  No results for input(s): \"IRONS\", \"FERRITIN\" in the last 72 hours.    Invalid input(s): \"LABIRONS\"  Urinalysis: No results for input(s): \"UA\" in the last 72

## 2024-04-02 NOTE — PROGRESS NOTES
Critical Care Progress Note    2024 8:51 AM    Subjective:   Admit Date: 3/29/2024  PCP: Adilene Terry MD    Chief Complaint   Patient presents with    Shortness of Breath     Sob poss. Due to fluid overload     Interval History: Levophed is on and off.  Plan for dialysis later today..  No fever overnight.      Medications:   Scheduled Meds:   midodrine  20 mg Oral TID WC    insulin lispro  0-16 Units SubCUTAneous TID WC    insulin lispro  0-4 Units SubCUTAneous Nightly    mupirocin   Topical BID    ceFAZolin  2,000 mg IntraVENous Q12H    [Held by provider] sodium zirconium cyclosilicate  10 g Oral Daily    hydrOXYzine HCl  25 mg Oral BID    sodium chloride flush  5-40 mL IntraVENous 2 times per day    ARIPiprazole  5 mg Oral Daily    atorvastatin  40 mg Oral Nightly    pantoprazole  20 mg Oral Daily    sevelamer  800 mg Oral TID    [Held by provider] apixaban  2.5 mg Oral BID    aspirin EC  81 mg Oral Daily    sertraline  50 mg Oral Nightly    miconazole   Topical BID    albuterol  2.5 mg Nebulization BID RT     Continuous Infusions:   norepinephrine 12 mcg/min (24 0436)    sodium chloride Stopped (24 1524)    dextrose           Objective:   Vitals:   Temp (24hrs), Av.1 °F (36.7 °C), Min:97.8 °F (36.6 °C), Max:98.5 °F (36.9 °C)    BP (!) 130/48   Pulse 95   Temp 98.2 °F (36.8 °C) (Oral)   Resp 16   Ht 1.702 m (5' 7\")   Wt 104.6 kg (230 lb 9.6 oz)   LMP  (LMP Unknown)   SpO2 98%   BMI 36.12 kg/m²   I/O:24HR INTAKE/OUTPUT:    Intake/Output Summary (Last 24 hours) at 2024 0851  Last data filed at 2024 2144  Gross per 24 hour   Intake 568.42 ml   Output 3300 ml   Net -2731.58 ml        0701 -  0700  In: 568.4 [I.V.:105.2]  Out: 3300   CVP:                  Physical Exam:  General appearance - alert, ill appearing, and in mild distress  Mental status - alert, oriented to person, place, and time  Eyes - pupils equal and reactive, bruises around her eyes  Nose - normal  record, management of life support systems, review of data including imaging and labs, discussions with other team members, patient's family and physicians at least 31 minutes so far today.        Electronically signed by Gabriella Almazan MD on 4/2/2024 at 8:51 AM

## 2024-04-02 NOTE — FLOWSHEET NOTE
7 AM- 7 PM shift summary    Patient remains A/O X4, following all commands, calm and cooperative. Bilateral wrist dressing changed per order, remains dry and intact. Patient repositioned throughout the day for comfort. Uneventful day.

## 2024-04-02 NOTE — PROGRESS NOTES
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 ([facility])     Type 2 diabetes mellitus with renal manifestations, controlled ([facility]) 2015    Insulin dependent, Dr Nino    Uncontrolled type 2 diabetes mellitus with hyperglycemia ([facility])     Urinary incontinence due to cognitive impairment 2013    Vitamin D deficiency 2014     Codeine and Oxycontin [oxycodone hcl]    VS reviewed    Gen- Alert and oriented x 3   Heart- RRR no murmur no LE edema   Lungs- CTA b/l no resp distress RA  oxygen   Abd- bs x 4          Assessment and Plan    Gen Weakness  PT OT   ESRD  Dialysis   Renvela   Dialysis unit aware of weight gain   Patient anuric.,   HTN  Afib   On eliquis   Metoprolol   Orthostatic hypotension   Midodrine   HLD  statin  DM  lantus  Depression/ anxiety  Abilify  sertraline    Will send to ER for fluid overload and likely need multiple dialysis sessions because they can't pull fluid off since hypotension.       DRU Alvarado DO     Electronically signed by: Manuela Kelley DO on 3/29/2024

## 2024-04-02 NOTE — PROGRESS NOTES
Cardiology progress note    Patient Name: Michelle Le  Admit Date: 3/29/2024 10:23 AM  MR #: [id number]  : 1960    Attending Physician: Adali Cintron MD  Reason for consult: Hypotension    History of Presenting Illness:      Michelle Le is a 64 y.o. female on hospital day 4 with a history of CAD prior PCI to the LAD, CABG LIMA to LAD and tricuspid valve repair complicated by tamponade and pericardial window, end-stage renal disease on hemodialysis, hypertension, hyperlipidemia, atrial fibrillation on Eliquis admitted to the hospital for shortness of breath. History Obtained From:  patient, electronic medical record    Patient hyperkalemic on admission 6.4  EKG atrial flutter 58 bpm  Troponins flat 130  =================  Hospital course  3/31/2024  Patient now in ICU due to hypotension  Currently patient on Levophed  Patient looks comfortable denies chest pain or shortness of breath  Still hyperkalemic but condition improving    2024: Patient is resting comfortably in bed and appears to be in no acute distress at this time.  She is on room air.  She is talking to me comfortably without any distress.  Patient went for dialysis today, levo was started during dialysis.  Patient worried about her blood pressures.  Patient denies any chest pain, or other anginal type symptoms.  Reports that her shortness of breath is at baseline and feels a little dizzy during time of examination.  Creatinine 5.56, GFR 7.9, hemoglobin 12.4, potassium within normal range  Chest x-ray from [facility] showed no right or left pleural effusion  Echo from 3/6/2024 showed EF of 55 to 60%.    2024: Patient is resting comfortably in bed during time of examination.  She is receiving hemodialysis.  She appears to be in no acute distress.  She is talking to me comfortably.  She is on 2 L of oxygen via nasal cannula.  Patient reports that she is worried about her low blood pressures.  Remains on midodrine and levo  Patient reports

## 2024-04-02 NOTE — PROGRESS NOTES
Palliative Care Progress Note  Patient: Michelle Le  Gender: female  YOB: 1960  Unit/Bed: IC05/IC05-01  CodeStatus: Full Code  Inpatient Treatment Team: Treatment Team: Attending Provider: Adali Cintron MD; Consulting Physician: Jorge Puentes MD; Consulting Physician: David Beltrán MD; Utilization Reviewer: Della Mock RN; Consulting Physician: Tiny Suresh DO; Consulting Physician: Gabriella Almazan MD; Wound/Ostomy Nurse: Jacqui Sanchez, RN; Registered Nurse: Lia Isaac RN; : Irais Nath; Utilization Reviewer: Della Dutton RN  Admit Date:  3/29/2024    Chief Complaint: Shortness of breath    History of Presenting Illness:      Michelle Le is a 64 y.o. female on hospital day 4 with a history of ESRD dialysis TTHS, HTN, afib on Eliquis, HLD, orhtostatic hypotension, DM, Depression, anxiety, CAD prior PCI to LAD, CABG, tricuspid valve repair complicated by tamponade and pericardial window.    Patient was brought to the emergency room with fluid overload and SOB. Patient was admitted for fluid overload. Patient recently hospitalized 3/5-3/9 for generalized weakness and falls. She was living with her daughter before that admission but had been discharged to [facility] for rehab.     Palliative care was consulted for end stage disease.      Patient normally lives at home with daughter.  However has been at [facility] for rehab since previous admission.  Patient reports she is up with a walker.  Patient denies any weight loss, appetite changes, nausea.    Upon entering room patient is laying in bed she is alert and oriented x 4.  Patient denies any pain, nausea, fever, chills, fatigue.  Patient has complaints of shortness of breath.  Patient is on 5 L nasal cannula.  Patient reports she normally does not wear oxygen.  Patient had dialysis yesterday.  Per nurse patient to have dialysis again tomorrow.    I discussed goals of care and CODE STATUS with patient at  with current plan of care.  Patient to remain a full code.  Patient/family consents to signing on with palliative care in the outpatient setting. Patient may also benefit from palliative care as an outpatient due to multiple comorbidities, increased risk for rehospitalization, and high risk for symptom burden.  Palliative care will continue to follow while hospitalized.    Hospice: Patient hospice eligible and setting of ESRD on dialysis with multiple comorbidities.  However hospice does not align with patient's current goals of care.      3. Advance Care Planning  Discussed goals of care with patient. Explained in extensive detail nuances between full code, DNR CCA and DNR CC. Patient has made the decision to be full code.    Patients POA is daughter Paulina      4. Goals of Care Discussion:  Disease process and goals of treatment were discussed in basic terms. Michelle's goal is to optimize available comfort care measures and all aggressive treatment. We discussed the palliative care philosophy in light of those goals. We discussed all care options contingent on treatment response and QOL. Much active listening, presence, and emotional support were given.     5. Fungal dermatits  Will order Nystatin powder BID to be applied to affected area.       I have discussed/reviewed the patient's case with nurse.    4/1/24:  Fluid management as per nephrology. No changes in goals of care. Continue with aggressive treatment/medications and full code status. No further symptom management needs at this time. Will continue to follow to monitor course of treatment.     4/2/24    Patients goal to remain a FULL CODE.   Patient has not had BM in 2 days. Will add Senokot- S 2 tabs BID PRN.  Palliative care will follow tentatively while hospitalized.     -Patient is currently being treated for multiple medical conditions by other providers  Fluid overload  ESRD on HD  Chronic hypotension  CAD  Shock, unknown etiology  Tricuspid valve

## 2024-04-02 NOTE — PROGRESS NOTES
Progress Note  Date:2024       [facility]  Patient Name:Michelle Le     YOB: 1960     Age:64 y.o.        Subjective    Subjective:  Symptoms:  She reports weakness.  No shortness of breath, malaise, cough, chest pain, headache, chest pressure, anorexia, diarrhea or anxiety.    Diet:  No nausea or vomiting.       Review of Systems   Respiratory:  Negative for cough and shortness of breath.    Cardiovascular:  Negative for chest pain.   Gastrointestinal:  Negative for anorexia, diarrhea, nausea and vomiting.   Neurological:  Positive for weakness.     Objective         Vitals Last 24 Hours:  TEMPERATURE:  Temp  Av.2 °F (36.8 °C)  Min: 97.8 °F (36.6 °C)  Max: 98.5 °F (36.9 °C)  RESPIRATIONS RANGE: Resp  Av.1  Min: 10  Max: 25  PULSE OXIMETRY RANGE: SpO2  Av.2 %  Min: 87 %  Max: 100 %  PULSE RANGE: Pulse  Av.8  Min: 91  Max: 108  BLOOD PRESSURE RANGE: Systolic (24hrs), Av , Min:75 , Max:130   ; Diastolic (24hrs), Av, Min:15, Max:78    I/O (24Hr):    Intake/Output Summary (Last 24 hours) at 2024 1231  Last data filed at 2024 2144  Gross per 24 hour   Intake 568.42 ml   Output 3300 ml   Net -2731.58 ml       Objective:  General Appearance:  In no acute distress.    Vital signs: (most recent): Blood pressure (!) 105/45, pulse (!) 101, temperature 98.4 °F (36.9 °C), temperature source Oral, resp. rate 10, height 1.702 m (5' 7\"), weight 104.6 kg (230 lb 9.6 oz), SpO2 (!) 88 %, not currently breastfeeding.    HEENT: Normal HEENT exam.    Lungs:  There are decreased breath sounds.    Heart: S1 normal and S2 normal.  No gallop.   Abdomen: Abdomen is soft.  Bowel sounds are normal.   There is no epigastric area or suprapubic area tenderness.     Extremities: There is no deformity.    Neurological: Patient is alert and oriented to person, place and time.    Pupils:  Pupils are equal, round, and reactive to light.    Skin:  Warm.      Labs/Imaging/Diagnostics

## 2024-04-02 NOTE — DIALYSIS
Patient tolerated treatment well and without complications with the use of BP supporting medications. AVF functioned well. 3500ml total net UF removed. Patient achieved hemostasis within 10 minutes post treatment. Primary RN received report.

## 2024-04-02 NOTE — PROGRESS NOTES
Nephrology Progress Note    Assessment:  ESRDX  Fluid overload  Hypotension  Wounds arms  Schizophrenia  Hx CVA  OHDX  CAD      Plan:repeat dialysis K+ and extra fluid on board edematous levo continous    Patient Active Problem List:     Coronary artery disease involving native coronary artery of native heart with angina pectoris ([facility])     Schizophrenia, paranoid, chronic     Metabolic syndrome     Vitamin B 12 deficiency     Cerebral microvascular disease     Mixed hyperlipidemia     Other hammer toe (acquired)     Vitamin D insufficiency     Incontinence of urine     Diabetic nephropathy with proteinuria (HCC)     Essential (primary) hypertension     History of type C viral hepatitis     Urinary incontinence due to cognitive impairment     History of seizures     Stented coronary artery-plan is to stay on Plavix indefinately per Dr Walker     Diabetes mellitus ([facility])     Controlled type 2 diabetes mellitus with diabetic neuropathy, with long-term current use of insulin ([facility])     Hemiparesis, left ([facility])     Angina, class II ([facility])     Pain, unspecified     Tardive dyskinesia     Shortness of breath     Uncontrolled type 2 diabetes mellitus with hyperglycemia ([facility])     Chronic diastolic congestive heart failure ([facility])     [place] (obstructive sleep apnea)     Pulmonary hypertension, unspecified ([facility])     Class 2 severe obesity with serious comorbidity and body mass index (BMI) of 36.0 to 36.9 in adult ([facility])     Edema     Closed supracondylar fracture of right humerus     Other chronic pain     Palliative care patient     Recurrent falls     Renal failure     Difficulty in walking     ESRD (end stage renal disease) on dialysis ([facility])     Weakness     Other seizures ([facility])     Moderate persistent asthma without complication     COVID-19     Post PTCA     Falls frequently     Chest wall contusion, left, initial encounter     Headache, unspecified     Paranoid schizophrenia (HCC)     Compression fracture of spine ([facility])     Closed  heart failure (HCC)      Subjective:  Admit Date: 3/29/2024    Interval History: no issues breathing still with levo    Medications:  Scheduled Meds:   midodrine  15 mg Oral TID WC    insulin lispro  0-16 Units SubCUTAneous TID WC    insulin lispro  0-4 Units SubCUTAneous Nightly    mupirocin   Topical BID    ceFAZolin  2,000 mg IntraVENous Q12H    [Held by provider] sodium zirconium cyclosilicate  10 g Oral Daily    hydrOXYzine HCl  25 mg Oral BID    sodium chloride flush  5-40 mL IntraVENous 2 times per day    ARIPiprazole  5 mg Oral Daily    atorvastatin  40 mg Oral Nightly    pantoprazole  20 mg Oral Daily    sevelamer  800 mg Oral TID    [Held by provider] apixaban  2.5 mg Oral BID    aspirin EC  81 mg Oral Daily    sertraline  50 mg Oral Nightly    miconazole   Topical BID    albuterol  2.5 mg Nebulization BID RT     Continuous Infusions:   norepinephrine 12 mcg/min (04/02/24 0436)    sodium chloride Stopped (03/30/24 1524)    dextrose         CBC:   Recent Labs     03/31/24  0402 04/01/24  0412   WBC 12.2* 8.9   HGB 12.4 12.4    132     CMP:    Recent Labs     03/31/24  0402 04/01/24  0412 04/02/24  0325   * 138 130*   K 5.3* 4.7 5.3*   CL 91* 93* 90*   CO2 22 24 22   BUN 33* 46* 27*   CREATININE 4.61* 5.56* 4.60*   GLUCOSE 234* 272* 314*   CALCIUM 8.5 8.7 8.6   LABGLOM 9.9* 7.9* 9.9*     Troponin: No results for input(s): \"TROPONINI\" in the last 72 hours.  BNP: No results for input(s): \"BNP\" in the last 72 hours.  INR: No results for input(s): \"INR\" in the last 72 hours.  Lipids: No results for input(s): \"CHOL\", \"LDLDIRECT\", \"TRIG\", \"HDL\", \"AMYLASE\", \"LIPASE\" in the last 72 hours.  Liver: No results for input(s): \"AST\", \"ALT\", \"ALKPHOS\", \"PROT\", \"LABALBU\", \"BILITOT\" in the last 72 hours.    Invalid input(s): \"BILDIR\"  Iron:  No results for input(s): \"IRONS\", \"FERRITIN\" in the last 72 hours.    Invalid input(s): \"LABIRONS\"  Urinalysis: No results for input(s): \"UA\" in the last 72

## 2024-04-03 ENCOUNTER — APPOINTMENT (OUTPATIENT)
Dept: GENERAL RADIOLOGY | Age: 64
End: 2024-04-03
Payer: MEDICARE

## 2024-04-03 ENCOUNTER — APPOINTMENT (OUTPATIENT)
Age: 64
End: 2024-04-03
Attending: INTERNAL MEDICINE
Payer: MEDICARE

## 2024-04-03 ENCOUNTER — APPOINTMENT (OUTPATIENT)
Dept: ULTRASOUND IMAGING | Age: 64
End: 2024-04-03
Payer: MEDICARE

## 2024-04-03 LAB
ALBUMIN SERPL-MCNC: 4.1 G/DL (ref 3.5–4.6)
ALP SERPL-CCNC: 88 U/L (ref 40–130)
ALT SERPL-CCNC: <5 U/L (ref 0–33)
ANION GAP SERPL CALCULATED.3IONS-SCNC: 14 MEQ/L (ref 9–15)
AST SERPL-CCNC: 14 U/L (ref 0–35)
BILIRUB SERPL-MCNC: 1.2 MG/DL (ref 0.2–0.7)
BUN SERPL-MCNC: 24 MG/DL (ref 8–23)
CALCIUM SERPL-MCNC: 8.3 MG/DL (ref 8.5–9.9)
CHLORIDE SERPL-SCNC: 92 MEQ/L (ref 95–107)
CO2 SERPL-SCNC: 29 MEQ/L (ref 20–31)
CREAT SERPL-MCNC: 4.6 MG/DL (ref 0.5–0.9)
ECHO BSA: 2.19 M2
ECHO EST RA PRESSURE: 15 MMHG
ECHO LA DIAMETER INDEX: 2.08 CM/M2
ECHO LA DIAMETER: 4.4 CM
ECHO LV EDV A2C: 68 ML
ECHO LV EDV A4C: 74 ML
ECHO LV EDV BP: 72 ML (ref 56–104)
ECHO LV EDV INDEX A4C: 35 ML/M2
ECHO LV EDV INDEX BP: 34 ML/M2
ECHO LV EDV NDEX A2C: 32 ML/M2
ECHO LV EJECTION FRACTION A2C: 85 %
ECHO LV EJECTION FRACTION A4C: 77 %
ECHO LV EJECTION FRACTION BIPLANE: 82 % (ref 55–100)
ECHO LV ESV A2C: 11 ML
ECHO LV ESV A4C: 17 ML
ECHO LV ESV BP: 13 ML (ref 19–49)
ECHO LV ESV INDEX A2C: 5 ML/M2
ECHO LV ESV INDEX A4C: 8 ML/M2
ECHO LV ESV INDEX BP: 6 ML/M2
ECHO LV FRACTIONAL SHORTENING: 33 % (ref 28–44)
ECHO LV INTERNAL DIMENSION DIASTOLE INDEX: 1.56 CM/M2
ECHO LV INTERNAL DIMENSION DIASTOLIC: 3.3 CM (ref 3.9–5.3)
ECHO LV INTERNAL DIMENSION SYSTOLIC INDEX: 1.04 CM/M2
ECHO LV INTERNAL DIMENSION SYSTOLIC: 2.2 CM
ECHO LV IVSD: 1.6 CM (ref 0.6–0.9)
ECHO LV IVSS: 1.7 CM
ECHO LV MASS 2D: 169 G (ref 67–162)
ECHO LV MASS INDEX 2D: 79.7 G/M2 (ref 43–95)
ECHO LV POSTERIOR WALL DIASTOLIC: 1.3 CM (ref 0.6–0.9)
ECHO LV POSTERIOR WALL SYSTOLIC: 1.7 CM
ECHO LV RELATIVE WALL THICKNESS RATIO: 0.79
ECHO PULMONARY ARTERY END DIASTOLIC PRESSURE: 6 MMHG
ECHO PV MAX VELOCITY: 1.1 M/S
ECHO PV PEAK GRADIENT: 5 MMHG
ECHO PV REGURGITANT MAX VELOCITY: 1.2 M/S
ECHO RIGHT VENTRICULAR SYSTOLIC PRESSURE (RVSP): 76 MMHG
ECHO RV INTERNAL DIMENSION: 3.6 CM
ECHO RV TAPSE: 0.7 CM (ref 1.7–?)
ECHO TV REGURGITANT MAX VELOCITY: 3.91 M/S
ECHO TV REGURGITANT PEAK GRADIENT: 61 MMHG
ERYTHROCYTE [DISTWIDTH] IN BLOOD BY AUTOMATED COUNT: 17.2 % (ref 11.5–14.5)
GLOBULIN SER CALC-MCNC: 2.8 G/DL (ref 2.3–3.5)
GLUCOSE BLD-MCNC: 202 MG/DL (ref 70–99)
GLUCOSE BLD-MCNC: 349 MG/DL (ref 70–99)
GLUCOSE BLD-MCNC: 404 MG/DL (ref 70–99)
GLUCOSE BLD-MCNC: >600 MG/DL (ref 70–99)
GLUCOSE SERPL-MCNC: 408 MG/DL (ref 70–99)
HCT VFR BLD AUTO: 38.7 % (ref 37–47)
HGB BLD-MCNC: 12.9 G/DL (ref 12–16)
MCH RBC QN AUTO: 33.5 PG (ref 27–31.3)
MCHC RBC AUTO-ENTMCNC: 33.3 % (ref 33–37)
MCV RBC AUTO: 100.5 FL (ref 79.4–94.8)
PERFORMED ON: ABNORMAL
PLATELET # BLD AUTO: 111 K/UL (ref 130–400)
POTASSIUM SERPL-SCNC: 3.6 MEQ/L (ref 3.4–4.9)
PROT SERPL-MCNC: 6.9 G/DL (ref 6.3–8)
RBC # BLD AUTO: 3.85 M/UL (ref 4.2–5.4)
SODIUM SERPL-SCNC: 135 MEQ/L (ref 135–144)
WBC # BLD AUTO: 9.8 K/UL (ref 4.8–10.8)

## 2024-04-03 PROCEDURE — 80053 COMPREHEN METABOLIC PANEL: CPT

## 2024-04-03 PROCEDURE — 93308 TTE F-UP OR LMTD: CPT

## 2024-04-03 PROCEDURE — 6370000000 HC RX 637 (ALT 250 FOR IP): Performed by: INTERNAL MEDICINE

## 2024-04-03 PROCEDURE — 93321 DOPPLER ECHO F-UP/LMTD STD: CPT | Performed by: INTERNAL MEDICINE

## 2024-04-03 PROCEDURE — 6360000002 HC RX W HCPCS: Performed by: INTERNAL MEDICINE

## 2024-04-03 PROCEDURE — 36415 COLL VENOUS BLD VENIPUNCTURE: CPT

## 2024-04-03 PROCEDURE — 94640 AIRWAY INHALATION TREATMENT: CPT

## 2024-04-03 PROCEDURE — 2700000000 HC OXYGEN THERAPY PER DAY

## 2024-04-03 PROCEDURE — 6370000000 HC RX 637 (ALT 250 FOR IP): Performed by: NURSE PRACTITIONER

## 2024-04-03 PROCEDURE — 99232 SBSQ HOSP IP/OBS MODERATE 35: CPT

## 2024-04-03 PROCEDURE — 93971 EXTREMITY STUDY: CPT

## 2024-04-03 PROCEDURE — 93308 TTE F-UP OR LMTD: CPT | Performed by: INTERNAL MEDICINE

## 2024-04-03 PROCEDURE — 99291 CRITICAL CARE FIRST HOUR: CPT | Performed by: INTERNAL MEDICINE

## 2024-04-03 PROCEDURE — 99232 SBSQ HOSP IP/OBS MODERATE 35: CPT | Performed by: INTERNAL MEDICINE

## 2024-04-03 PROCEDURE — 2580000003 HC RX 258: Performed by: INTERNAL MEDICINE

## 2024-04-03 PROCEDURE — 2500000003 HC RX 250 WO HCPCS: Performed by: INTERNAL MEDICINE

## 2024-04-03 PROCEDURE — 99233 SBSQ HOSP IP/OBS HIGH 50: CPT | Performed by: INTERNAL MEDICINE

## 2024-04-03 PROCEDURE — 94761 N-INVAS EAR/PLS OXIMETRY MLT: CPT

## 2024-04-03 PROCEDURE — 93325 DOPPLER ECHO COLOR FLOW MAPG: CPT | Performed by: INTERNAL MEDICINE

## 2024-04-03 PROCEDURE — 2000000000 HC ICU R&B

## 2024-04-03 PROCEDURE — 74018 RADEX ABDOMEN 1 VIEW: CPT

## 2024-04-03 PROCEDURE — 94760 N-INVAS EAR/PLS OXIMETRY 1: CPT

## 2024-04-03 PROCEDURE — 85027 COMPLETE CBC AUTOMATED: CPT

## 2024-04-03 RX ORDER — METOCLOPRAMIDE HYDROCHLORIDE 5 MG/ML
10 INJECTION INTRAMUSCULAR; INTRAVENOUS ONCE
Status: COMPLETED | OUTPATIENT
Start: 2024-04-03 | End: 2024-04-03

## 2024-04-03 RX ORDER — INSULIN GLARGINE 100 [IU]/ML
10 INJECTION, SOLUTION SUBCUTANEOUS 2 TIMES DAILY
Status: DISCONTINUED | OUTPATIENT
Start: 2024-04-03 | End: 2024-04-04

## 2024-04-03 RX ORDER — BISACODYL 5 MG/1
10 TABLET, DELAYED RELEASE ORAL ONCE
Status: COMPLETED | OUTPATIENT
Start: 2024-04-03 | End: 2024-04-03

## 2024-04-03 RX ORDER — INSULIN LISPRO 100 [IU]/ML
5 INJECTION, SOLUTION INTRAVENOUS; SUBCUTANEOUS ONCE
Status: COMPLETED | OUTPATIENT
Start: 2024-04-03 | End: 2024-04-03

## 2024-04-03 RX ADMIN — Medication 17 MCG/MIN: at 00:19

## 2024-04-03 RX ADMIN — MIDODRINE HYDROCHLORIDE 20 MG: 10 TABLET ORAL at 16:31

## 2024-04-03 RX ADMIN — HYDROXYZINE HYDROCHLORIDE 25 MG: 25 TABLET ORAL at 20:16

## 2024-04-03 RX ADMIN — INSULIN LISPRO 12 UNITS: 100 INJECTION, SOLUTION INTRAVENOUS; SUBCUTANEOUS at 16:28

## 2024-04-03 RX ADMIN — MUPIROCIN: 20 OINTMENT TOPICAL at 20:21

## 2024-04-03 RX ADMIN — CEFAZOLIN 2000 MG: 2 INJECTION, POWDER, FOR SOLUTION INTRAMUSCULAR; INTRAVENOUS at 06:57

## 2024-04-03 RX ADMIN — MICONAZOLE NITRATE: 2 POWDER TOPICAL at 08:16

## 2024-04-03 RX ADMIN — SEVELAMER CARBONATE 800 MG: 800 TABLET, FILM COATED ORAL at 08:07

## 2024-04-03 RX ADMIN — INSULIN LISPRO 16 UNITS: 100 INJECTION, SOLUTION INTRAVENOUS; SUBCUTANEOUS at 08:05

## 2024-04-03 RX ADMIN — ATORVASTATIN CALCIUM 40 MG: 40 TABLET, FILM COATED ORAL at 20:16

## 2024-04-03 RX ADMIN — APIXABAN 2.5 MG: 2.5 TABLET, FILM COATED ORAL at 20:16

## 2024-04-03 RX ADMIN — MIDODRINE HYDROCHLORIDE 20 MG: 10 TABLET ORAL at 08:07

## 2024-04-03 RX ADMIN — METOCLOPRAMIDE HYDROCHLORIDE 10 MG: 5 INJECTION INTRAMUSCULAR; INTRAVENOUS at 19:26

## 2024-04-03 RX ADMIN — SENNOSIDES AND DOCUSATE SODIUM 2 TABLET: 50; 8.6 TABLET ORAL at 11:42

## 2024-04-03 RX ADMIN — MUPIROCIN: 20 OINTMENT TOPICAL at 10:52

## 2024-04-03 RX ADMIN — PANTOPRAZOLE SODIUM 20 MG: 20 TABLET, DELAYED RELEASE ORAL at 08:07

## 2024-04-03 RX ADMIN — HYDROXYZINE HYDROCHLORIDE 25 MG: 25 TABLET ORAL at 08:15

## 2024-04-03 RX ADMIN — POLYETHYLENE GLYCOL 3350 17 G: 17 POWDER, FOR SOLUTION ORAL at 17:47

## 2024-04-03 RX ADMIN — CEFAZOLIN 2000 MG: 2 INJECTION, POWDER, FOR SOLUTION INTRAMUSCULAR; INTRAVENOUS at 17:50

## 2024-04-03 RX ADMIN — ONDANSETRON 4 MG: 2 INJECTION INTRAMUSCULAR; INTRAVENOUS at 13:37

## 2024-04-03 RX ADMIN — INSULIN GLARGINE 10 UNITS: 100 INJECTION, SOLUTION SUBCUTANEOUS at 20:16

## 2024-04-03 RX ADMIN — SEVELAMER CARBONATE 800 MG: 800 TABLET, FILM COATED ORAL at 20:16

## 2024-04-03 RX ADMIN — ASPIRIN 81 MG: 81 TABLET, COATED ORAL at 08:07

## 2024-04-03 RX ADMIN — ALBUTEROL SULFATE 2.5 MG: 2.5 SOLUTION RESPIRATORY (INHALATION) at 04:32

## 2024-04-03 RX ADMIN — BISACODYL 10 MG: 5 TABLET, COATED ORAL at 19:26

## 2024-04-03 RX ADMIN — ALBUTEROL SULFATE 2.5 MG: 2.5 SOLUTION RESPIRATORY (INHALATION) at 15:26

## 2024-04-03 RX ADMIN — ARIPIPRAZOLE 5 MG: 5 TABLET ORAL at 08:07

## 2024-04-03 RX ADMIN — SEVELAMER CARBONATE 800 MG: 800 TABLET, FILM COATED ORAL at 12:28

## 2024-04-03 RX ADMIN — INSULIN LISPRO 16 UNITS: 100 INJECTION, SOLUTION INTRAVENOUS; SUBCUTANEOUS at 11:41

## 2024-04-03 RX ADMIN — INSULIN LISPRO 5 UNITS: 100 INJECTION, SOLUTION INTRAVENOUS; SUBCUTANEOUS at 11:44

## 2024-04-03 RX ADMIN — SERTRALINE HYDROCHLORIDE 50 MG: 50 TABLET ORAL at 20:16

## 2024-04-03 RX ADMIN — SENNOSIDES AND DOCUSATE SODIUM 2 TABLET: 50; 8.6 TABLET ORAL at 22:26

## 2024-04-03 RX ADMIN — SODIUM CHLORIDE, PRESERVATIVE FREE 10 ML: 5 INJECTION INTRAVENOUS at 08:55

## 2024-04-03 RX ADMIN — INSULIN GLARGINE 10 UNITS: 100 INJECTION, SOLUTION SUBCUTANEOUS at 08:53

## 2024-04-03 RX ADMIN — MICONAZOLE NITRATE: 2 POWDER TOPICAL at 20:21

## 2024-04-03 RX ADMIN — MIDODRINE HYDROCHLORIDE 20 MG: 10 TABLET ORAL at 11:42

## 2024-04-03 ASSESSMENT — PAIN SCALES - GENERAL
PAINLEVEL_OUTOF10: 0

## 2024-04-03 NOTE — PROGRESS NOTES
Nephrology Progress Note    Assessment:  ESRDX  HFmrEF  CAD  DM type-2  Hypotension chronic      Plan:    Patient Active Problem List:     Coronary artery disease involving native coronary artery of native heart with angina pectoris ([facility])     Schizophrenia, paranoid, chronic     Metabolic syndrome     Vitamin B 12 deficiency     Cerebral microvascular disease     Mixed hyperlipidemia     Other hammer toe (acquired)     Vitamin D insufficiency     Incontinence of urine     Diabetic nephropathy with proteinuria ([facility])     Essential (primary) hypertension     History of type C viral hepatitis     Urinary incontinence due to cognitive impairment     History of seizures     Stented coronary artery-plan is to stay on Plavix indefinately per Dr Walker     Diabetes mellitus ([facility])     Controlled type 2 diabetes mellitus with diabetic neuropathy, with long-term current use of insulin ([facility])     Hemiparesis, left ([facility])     Angina, class II ([facility])     Pain, unspecified     Tardive dyskinesia     Shortness of breath     Uncontrolled type 2 diabetes mellitus with hyperglycemia ([facility])     Chronic diastolic congestive heart failure ([facility])     [place] (obstructive sleep apnea)     Pulmonary hypertension, unspecified ([facility])     Class 2 severe obesity with serious comorbidity and body mass index (BMI) of 36.0 to 36.9 in adult ([facility])     Edema     Closed supracondylar fracture of right humerus     Other chronic pain     Palliative care patient     Recurrent falls     Renal failure     Difficulty in walking     ESRD (end stage renal disease) on dialysis ([facility])     Weakness     Other seizures ([facility])     Moderate persistent asthma without complication     COVID-19     Post PTCA     Falls frequently     Chest wall contusion, left, initial encounter     Headache, unspecified     Paranoid schizophrenia ([facility])     Compression fracture of spine ([facility])     Closed rib fracture     Depression     Chronic obstructive pulmonary disease ([facility])     Critical illness  prosthetic knee ([facility])      Subjective:  Admit Date: 3/29/2024    Interval History: stable    Medications:  Scheduled Meds:   insulin glargine  10 Units SubCUTAneous BID    midodrine  20 mg Oral TID WC    insulin lispro  0-16 Units SubCUTAneous TID WC    insulin lispro  0-4 Units SubCUTAneous Nightly    mupirocin   Topical BID    ceFAZolin  2,000 mg IntraVENous Q12H    [Held by provider] sodium zirconium cyclosilicate  10 g Oral Daily    hydrOXYzine HCl  25 mg Oral BID    sodium chloride flush  5-40 mL IntraVENous 2 times per day    ARIPiprazole  5 mg Oral Daily    atorvastatin  40 mg Oral Nightly    pantoprazole  20 mg Oral Daily    sevelamer  800 mg Oral TID    [Held by provider] apixaban  2.5 mg Oral BID    aspirin EC  81 mg Oral Daily    sertraline  50 mg Oral Nightly    miconazole   Topical BID    albuterol  2.5 mg Nebulization BID RT     Continuous Infusions:   norepinephrine 11 mcg/min (04/03/24 0808)    sodium chloride Stopped (03/30/24 1524)    dextrose         CBC:   Recent Labs     04/01/24  0412 04/03/24  0748   WBC 8.9 9.8   HGB 12.4 12.9    111*     CMP:    Recent Labs     04/01/24  0412 04/02/24  0325    130*   K 4.7 5.3*   CL 93* 90*   CO2 24 22   BUN 46* 27*   CREATININE 5.56* 4.60*   GLUCOSE 272* 314*   CALCIUM 8.7 8.6   LABGLOM 7.9* 9.9*     Troponin: No results for input(s): \"TROPONINI\" in the last 72 hours.  BNP: No results for input(s): \"BNP\" in the last 72 hours.  INR: No results for input(s): \"INR\" in the last 72 hours.  Lipids: No results for input(s): \"CHOL\", \"LDLDIRECT\", \"TRIG\", \"HDL\", \"AMYLASE\", \"LIPASE\" in the last 72 hours.  Liver: No results for input(s): \"AST\", \"ALT\", \"ALKPHOS\", \"PROT\", \"LABALBU\", \"BILITOT\" in the last 72 hours.    Invalid input(s): \"BILDIR\"  Iron:  No results for input(s): \"IRONS\", \"FERRITIN\" in the last 72 hours.    Invalid input(s): \"LABIRONS\"  Urinalysis: No results for input(s): \"UA\" in the last 72 hours.    Objective:  Vitals: BP (!) 130/57   Pulse

## 2024-04-03 NOTE — CARE COORDINATION
Rounds done with bedside nurse and pt has plan to return to [facility] on dc. Pt on IV Levo still requiring ICU care and monitoring.

## 2024-04-03 NOTE — PROGRESS NOTES
Cardiology progress note    Patient Name: Michelle Le  Admit Date: 3/29/2024 10:23 AM  MR #: [id number]  : 1960    Attending Physician: Adali Cintron MD  Reason for consult: Hypotension    History of Presenting Illness:      Michelle Le is a 64 y.o. female on hospital day 5 with a history of CAD prior PCI to the LAD, CABG LIMA to LAD and tricuspid valve repair complicated by tamponade and pericardial window, end-stage renal disease on hemodialysis, hypertension, hyperlipidemia, atrial fibrillation on Eliquis admitted to the hospital for shortness of breath. History Obtained From:  patient, electronic medical record    Patient hyperkalemic on admission 6.4  EKG atrial flutter 58 bpm  Troponins flat 130  =================  Hospital course  3/31/2024  Patient now in ICU due to hypotension  Currently patient on Levophed  Patient looks comfortable denies chest pain or shortness of breath  Still hyperkalemic but condition improving    2024: Patient is resting comfortably in bed and appears to be in no acute distress at this time.  She is on room air.  She is talking to me comfortably without any distress.  Patient went for dialysis today, levo was started during dialysis.  Patient worried about her blood pressures.  Patient denies any chest pain, or other anginal type symptoms.  Reports that her shortness of breath is at baseline and feels a little dizzy during time of examination.  Creatinine 5.56, GFR 7.9, hemoglobin 12.4, potassium within normal range  Chest x-ray from [facility] showed no right or left pleural effusion  Echo from 3/6/2024 showed EF of 55 to 60%.    2024: Patient is resting comfortably in bed during time of examination.  She is receiving hemodialysis.  She appears to be in no acute distress.  She is talking to me comfortably.  She is on 2 L of oxygen via nasal cannula.  Patient reports that she is worried about her low blood pressures.  Remains on midodrine and levo  Patient reports  differently: Take 1 tablet by mouth nightly Indications: High Amount of Fats in the Blood)  Handicap Placard MISC, by Does not apply route Expiration in 5 years.  blood glucose test strips (ONETOUCH VERIO) strip, QID  OneTouch Delica Lancets 33G MISC, QID  aspirin EC 81 MG EC tablet, Take 1 tablet by mouth daily (Patient taking differently: Take 1 tablet by mouth daily Indications: Thickening and Hardening of Heart Arteries)  blood glucose test strips (FREESTYLE LITE) strip, 1 each by Does not apply route 4 times daily (before meals and nightly) As needed.  acetaminophen (TYLENOL) 325 MG tablet, Take 2 tablets by mouth every 4 hours as needed for Pain (For mild to moderate pain (Pain 1-6 out of 10 on pain scale))  Blood Glucose Monitoring Suppl (FREESTYLE LITE) CORETTA, 1 Device by Does not apply route daily as needed (Diabetes) Use freestyle meter to test blood sugar as needed    Current Hospital Medications:   Scheduled Meds:   midodrine  20 mg Oral TID WC    insulin lispro  0-16 Units SubCUTAneous TID WC    insulin lispro  0-4 Units SubCUTAneous Nightly    mupirocin   Topical BID    ceFAZolin  2,000 mg IntraVENous Q12H    [Held by provider] sodium zirconium cyclosilicate  10 g Oral Daily    hydrOXYzine HCl  25 mg Oral BID    sodium chloride flush  5-40 mL IntraVENous 2 times per day    ARIPiprazole  5 mg Oral Daily    atorvastatin  40 mg Oral Nightly    pantoprazole  20 mg Oral Daily    sevelamer  800 mg Oral TID    [Held by provider] apixaban  2.5 mg Oral BID    aspirin EC  81 mg Oral Daily    sertraline  50 mg Oral Nightly    miconazole   Topical BID    albuterol  2.5 mg Nebulization BID RT     Continuous Infusions:   norepinephrine 17 mcg/min (04/03/24 0019)    sodium chloride Stopped (03/30/24 1524)    dextrose       PRN Meds:.sennosides-docusate sodium, sodium chloride flush, sodium chloride, ondansetron **OR** ondansetron, polyethylene glycol, acetaminophen **OR** acetaminophen, albumin human 25%, glucose,

## 2024-04-03 NOTE — PLAN OF CARE
Problem: Discharge Planning  Goal: Discharge to home or other facility with appropriate resources  Recent Flowsheet Documentation  Taken 4/2/2024 2000 by Anuja Hester RN  Discharge to home or other facility with appropriate resources:   Identify barriers to discharge with patient and caregiver   Arrange for needed discharge resources and transportation as appropriate   Identify discharge learning needs (meds, wound care, etc)   Arrange for interpreters to assist at discharge as needed   Refer to discharge planning if patient needs post-hospital services based on physician order or complex needs related to functional status, cognitive ability or social support system     Problem: Chronic Conditions and Co-morbidities  Goal: Patient's chronic conditions and co-morbidity symptoms are monitored and maintained or improved  Recent Flowsheet Documentation  Taken 4/2/2024 2000 by Anuja Hester RN  Care Plan - Patient's Chronic Conditions and Co-Morbidity Symptoms are Monitored and Maintained or Improved:   Monitor and assess patient's chronic conditions and comorbid symptoms for stability, deterioration, or improvement   Collaborate with multidisciplinary team to address chronic and comorbid conditions and prevent exacerbation or deterioration   Update acute care plan with appropriate goals if chronic or comorbid symptoms are exacerbated and prevent overall improvement and discharge     Problem: Pain  Goal: Verbalizes/displays adequate comfort level or baseline comfort level  Recent Flowsheet Documentation  Taken 4/2/2024 2000 by Anuja Hester RN  Verbalizes/displays adequate comfort level or baseline comfort level:   Encourage patient to monitor pain and request assistance   Assess pain using appropriate pain scale   Administer analgesics based on type and severity of pain and evaluate response   Implement non-pharmacological measures as appropriate and evaluate response   Notify Licensed Independent Practitioner if

## 2024-04-03 NOTE — PROGRESS NOTES
Progress Note  Date:4/3/2024       [facility]  Patient Name:Michelle Le     YOB: 1960     Age:64 y.o.        Subjective    Subjective:  Symptoms:  She reports weakness.  No shortness of breath, malaise, cough, chest pain, headache, chest pressure, anorexia, diarrhea or anxiety.    Diet:  No nausea or vomiting.       Review of Systems   Respiratory:  Negative for cough and shortness of breath.    Cardiovascular:  Negative for chest pain.   Gastrointestinal:  Negative for anorexia, diarrhea, nausea and vomiting.   Neurological:  Positive for weakness.     Objective         Vitals Last 24 Hours:  TEMPERATURE:  Temp  Av.1 °F (36.7 °C)  Min: 97.9 °F (36.6 °C)  Max: 98.4 °F (36.9 °C)  RESPIRATIONS RANGE: Resp  Av  Min: 10  Max: 21  PULSE OXIMETRY RANGE: SpO2  Av.5 %  Min: 56 %  Max: 100 %  PULSE RANGE: Pulse  Av.3  Min: 93  Max: 133  BLOOD PRESSURE RANGE: Systolic (24hrs), Av , Min:60 , Max:138   ; Diastolic (24hrs), Av, Min:19, Max:64    I/O (24Hr):    Intake/Output Summary (Last 24 hours) at 4/3/2024 1048  Last data filed at 4/3/2024 0808  Gross per 24 hour   Intake 4838.73 ml   Output 400 ml   Net 4438.73 ml       Objective:  General Appearance:  In no acute distress.    Vital signs: (most recent): Blood pressure (!) 130/57, pulse 97, temperature 98 °F (36.7 °C), temperature source Axillary, resp. rate 12, height 1.702 m (5' 7\"), weight 101.5 kg (223 lb 12.3 oz), SpO2 97 %, not currently breastfeeding.    HEENT: Normal HEENT exam.    Lungs:  There are decreased breath sounds.    Heart: S1 normal and S2 normal.  No gallop.   Abdomen: Abdomen is soft.  Bowel sounds are normal.   There is no epigastric area or suprapubic area tenderness.     Extremities: There is no deformity.    Neurological: Patient is alert and oriented to person, place and time.    Pupils:  Pupils are equal, round, and reactive to light.    Skin:  Warm.      Labs/Imaging/Diagnostics

## 2024-04-03 NOTE — PROGRESS NOTES
Pt report received from Lia HUDDLESTON. Pt AxO4, follows commands. Pt levophed titrated per order. Pt bathed and full linen change completed. Pt report given to Ashley HUDDLESTON

## 2024-04-03 NOTE — PROGRESS NOTES
Spiritual Care Services     Summary of Visit:    Patient was alert and oriented, patient has HCPOA, on going spiritual health care as needed       Encounter Summary  Encounter Overview/Reason : Spiritual/Emotional Needs, Follow-up  Service Provided For:: Patient  Referral/Consult From:: [facility]  Support System: Children  Last Encounter : 04/02/24  Complexity of Encounter: Moderate  Begin Time: 0800  End Time : 0815  Total Time Calculated: 15 min        Spiritual/Emotional needs  Type: Spiritual Support                      Spiritual Assessment/Intervention/Outcomes:    Assessment: Concerns with suffering    Intervention: Sustaining Presence/Ministry of presence, Active listening    Outcome: Receptive      Care Plan:    Plan and Referrals  Plan/Referrals: Continue to visit, (comment), Continue Support (comment)          Spiritual Care Services   Electronically signed by Chaplain Galen on 4/3/2024 at 2:25 PM.    To reach a  for emotional and spiritual support, place an EPIC consult request.   If a  is needed immediately, dial “0” and ask to page the on-call .

## 2024-04-03 NOTE — PROGRESS NOTES
04/03/2024 07:48 AM    HGB 12.9 04/03/2024 07:48 AM    HCT 38.7 04/03/2024 07:48 AM    .5 04/03/2024 07:48 AM    MCH 33.5 04/03/2024 07:48 AM    MCHC 33.3 04/03/2024 07:48 AM    RDW 17.2 04/03/2024 07:48 AM     04/03/2024 07:48 AM    MPV 10.0 03/05/2020 12:00 AM     CBC with Differential:   Lab Results   Component Value Date/Time    WBC 9.8 04/03/2024 07:48 AM    RBC 3.85 04/03/2024 07:48 AM    HGB 12.9 04/03/2024 07:48 AM    HCT 38.7 04/03/2024 07:48 AM     04/03/2024 07:48 AM    .5 04/03/2024 07:48 AM    MCH 33.5 04/03/2024 07:48 AM    MCHC 33.3 04/03/2024 07:48 AM    RDW 17.2 04/03/2024 07:48 AM    NRBC 0.0 07/30/2021 08:16 AM    BANDSPCT 5 12/25/2023 10:30 PM    LYMPHOPCT 14.7 04/01/2024 04:12 AM    MONOPCT 9.0 04/01/2024 04:12 AM    EOSPCT 3.5 03/05/2020 12:00 AM    BASOPCT 0.2 04/01/2024 04:12 AM    MONOSABS 0.8 04/01/2024 04:12 AM    LYMPHSABS 1.3 04/01/2024 04:12 AM    EOSABS 0.0 04/01/2024 04:12 AM    BASOSABS 0.0 04/01/2024 04:12 AM     CMP:    Lab Results   Component Value Date/Time     04/03/2024 07:48 AM    K 3.6 04/03/2024 07:48 AM    CL 92 04/03/2024 07:48 AM    CO2 29 04/03/2024 07:48 AM    BUN 24 04/03/2024 07:48 AM    CREATININE 4.60 04/03/2024 07:48 AM    GFRAA 17.5 10/06/2022 11:15 PM    LABGLOM 9.9 04/03/2024 07:48 AM    GLUCOSE 408 04/03/2024 07:48 AM    GLUCOSE 90 10/24/2022 10:43 AM    PROT 6.9 04/03/2024 07:48 AM    LABALBU 4.1 04/03/2024 07:48 AM    CALCIUM 8.3 04/03/2024 07:48 AM    BILITOT 1.2 04/03/2024 07:48 AM    ALKPHOS 88 04/03/2024 07:48 AM    AST 14 04/03/2024 07:48 AM    ALT <5 04/03/2024 07:48 AM     BMP:    Lab Results   Component Value Date/Time     04/03/2024 07:48 AM    K 3.6 04/03/2024 07:48 AM    CL 92 04/03/2024 07:48 AM    CO2 29 04/03/2024 07:48 AM    BUN 24 04/03/2024 07:48 AM    LABALBU 4.1 04/03/2024 07:48 AM    CREATININE 4.60 04/03/2024 07:48 AM    CALCIUM 8.3 04/03/2024 07:48 AM    GFRAA 17.5 10/06/2022 11:15 PM    LABGLOM  upper lobe are clear The left lung base is poorly visualized due to the cardiomegaly and overlying left breast tissue as well as secondary to rotation. There is no appreciable right or left pleural effusion.          Assessment and plan:  Palliative care encounter:  Explained the role of palliative care in treating patient. Discussed symptom management related to chronic disease/condition. Provided emotional support and active listening. Patient understands and is agreeable to current plan.  Continue with current plan of care.  Patient to remain a full code.  Patient/family consents to signing on with palliative care in the outpatient setting. Patient may also benefit from palliative care as an outpatient due to multiple comorbidities, increased risk for rehospitalization, and high risk for symptom burden.  Palliative care will continue to follow while hospitalized.    Hospice: Patient hospice eligible and setting of ESRD on dialysis with multiple comorbidities.  However hospice does not align with patient's current goals of care.      3. Advance Care Planning  Discussed goals of care with patient. Explained in extensive detail nuances between full code, DNR CCA and DNR CC. Patient has made the decision to be full code.    Patients POA is daughter Paulina      4. Goals of Care Discussion:  Disease process and goals of treatment were discussed in basic terms. Michelle's goal is to optimize available comfort care measures and all aggressive treatment. We discussed the palliative care philosophy in light of those goals. We discussed all care options contingent on treatment response and QOL. Much active listening, presence, and emotional support were given.     5. Fungal dermatits  Will order Nystatin powder BID to be applied to affected area.       I have discussed/reviewed the patient's case with nurse.    4/1/24:  Fluid management as per nephrology. No changes in goals of care. Continue with aggressive

## 2024-04-03 NOTE — PROGRESS NOTES
Michelle Le  1960  female  Medical Record Number: [id number]    Patient informed that I am an Infectious Disease physician and permission obtained from the patient to speak in front of any visitors prior to any discussion for HIPPA purposes.     HPI:  Patient admitted with shortness of breath.  She is a dialysis patient.  She has been picking at her skin and has multiple self-inflicted wounds.  Noted to be hypoxic with hypotension on admission  Started on cefazolin empirically.  Cultures from her hand have not grown anything.  She tells me that she picks at her skin when she is nervous.  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 Amoxicillin. She also has a history of dialysis catheter related Enterococcus bacteremia.     All cultures have been negative to date.  Per medical record patient is requiring increased dose of Levophed.  Unclear reason    Physical Exam:  No fevers, interacts with me nicely today, asks good questions, no slurred speech  Skin: no new rashes but patient has multiple wounds self-inflicted, no worse.  HEENT: EOMI, MMM, Neck is supple  Heart: S1 S2  Lungs: bilaterally equal expansion  Abdomen: soft, ND, NTTP, +BS  Extrem: No pain in calf region  neuro exam: CN II-XII intact  The excoriations are all in areas that she can easily reach, including her face and scalp    Labs:   I have reviewed all lab results by electronic record, including most recent CBC, metabolic panel, and pertinent abnormalities were addressed from an infectious disease perspective. WBC trends are being monitored.    Lab Results   Component Value Date/Time     04/02/2024 03:25 AM    K 5.3 04/02/2024 03:25 AM    K 4.3 03/09/2024 05:43 AM    CL 90 04/02/2024 03:25 AM    CO2 22 04/02/2024 03:25 AM    BUN 27  04/02/2024 03:25 AM    CREATININE 4.60 04/02/2024 03:25 AM    GLUCOSE 314 04/02/2024 03:25 AM    GLUCOSE 90 10/24/2022 10:43 AM    CALCIUM 8.6 04/02/2024 03:25 AM    LABGLOM 9.9 04/02/2024 03:25 AM      Lab Results   Component Value Date    WBC 8.9 04/01/2024    HGB 12.4 04/01/2024    HCT 38.2 04/01/2024    .6 (H) 04/01/2024     04/01/2024     Echo from 3/6/2024 showed EF of 55 to 60%     CT of the chest  IMPRESSION:  1. Resolution of the previously seen air in the right atrium.  2. Advanced cardiomegaly with small bilateral pleural effusions and slight  lower lung zone interlobular septal thickening.  3. Slight nodularity of the contour of the liver with mild perihepatic and  perisplenic ascitic fluid.  4. Mild compression deformity of an upper lumbar vertebral body.    Radiology:   I have reviewed imaging results per electronic record and most pertinent abnormalities are being addressed from an infectious disease standpoint.       Assessment:  Multiple open traumatic wounds bilateral upper extremities, dorsal hands  Skin picking habit  Infected draining wounds  End-stage renal disease on dialysis  Chronic prosthetic joint infection, left knee, usually on chronic suppression with amoxicillin    Plan:  Currently on a course of cefazolin, can continue this tonight.  Will discuss changing to Unasyn while she is inpatient  Local wound care  Discouraged skin picking, explained that she has a higher chance of developing a bloodstream infection which could lead to complications such as endocarditis if she continues to pick at her skin until it bleeds.  Supportive care        Tiny Suresh D.O.

## 2024-04-03 NOTE — PROGRESS NOTES
1910: Report received at bedside from Ashley HUDDLESTON. Patient is currently on levo.     2000: Assessment completed at this time.     2330: Linens and gown changed at this time. Lights turned off for patient comfort.     0000: Assessment completed at this time.    0400: Assessment completed at this time.     0700: Report given at bedside to RN. Patient remains on levophed.

## 2024-04-03 NOTE — PROGRESS NOTES
0800 am assessment completed as noted.   1142 patient given senokot prn.  1337 zofran given for nausea.  1430 pt had emesis  1747 glycolax given.  1808 notified Dr. Cintron pt wanting something for bowels. Informed him of prn's given and of patient having another emesis. New order given. Electronically signed by Ashley Benjamin RN on 4/3/2024 at 6:38 PM

## 2024-04-03 NOTE — PROGRESS NOTES
results for input(s): \"NITRITE\", \"COLORU\", \"PHUR\", \"LABCAST\", \"WBCUA\", \"RBCUA\", \"MUCUS\", \"TRICHOMONAS\", \"YEAST\", \"BACTERIA\", \"CLARITYU\", \"SPECGRAV\", \"LEUKOCYTESUR\", \"UROBILINOGEN\", \"BILIRUBINUR\", \"BLOODU\", \"GLUCOSEU\", \"AMORPHOUS\" in the last 72 hours.    Invalid input(s): \"KETONESU\"    Cultures:  Negative so far  Films:  CXR films reviewed by me and it showed bilateral pleural effusion      Assessment:  This is a critically ill patient at risk of deterioration / death , needing close ICU monitoring and intervention due to below noted problems   Shock etiology is not clear,?  Septic, rule out obstructive or cardiogenic etiology, recent echo March 6 EF 55%  End-stage renal disease on dialysis  Volume overload  Swelling right lower extremity  A-fib on low-dose Eliquis currently on hold    Recommendation  Continue Levophed target mean arterial pressure 65-70  Continue midodrine  Check procalcitonin  Right lower extremity ultrasound  Limited echo evaluate RV and EF  Resume Eliquis if okay with nephrology  Consider CT angio, will await echo results  Target blood sugar 140-180   Add Lantus 10 units daily      Due to the immediate potential for life-threatening deterioration due to shock I spent 35  minutes providing critical care.  This time is excluding time spent performing procedures.          Electronically signed by Say Marsh MD,  [facility] ,on 4/3/2024 at 1:14 PM

## 2024-04-03 NOTE — DISCHARGE INSTR - COC
Continuity of Care Form    Patient Name: Michelle Le   :  1960  MRN:  [id number]    Admit date:  3/29/2024  Discharge date:  2024      Code Status Order: Full Code   Advance Directives:     Admitting Physician:  Adali Cintron MD  PCP: Adilene Terry MD    Discharging Nurse: Ayad  Discharging Hospital Unit/Room#: IC05/IC05-01  Discharging Unit Phone Number: [phone]    Emergency Contact:   Extended Emergency Contact Information  Primary Emergency Contact: Angelina Fagan   [facility]  Home Phone: [phone]  Work Phone: [phone]  Mobile Phone: [phone]  Relation: Child    Past Surgical History:  Past Surgical History:   Procedure Laterality Date    AV FISTULA CREATION Left 2023    [facility]     SECTION      x1    COLONOSCOPY  2014    Dr. Bello    CORONARY ANGIOPLASTY WITH STENT PLACEMENT      x1 Dr. Walker, Dr Borja 2018    CORONARY ANGIOPLASTY WITH STENT PLACEMENT  08/10/2021    [facility]    DIAGNOSTIC CARDIAC CATH LAB PROCEDURE  10/02/2019    DIALYSIS CATHETER INSERTION Left 2022    Tunneled Symetrex 15.5F x 23cm hemodialysis catheter inserted by Dr. White    DIALYSIS CATHETER INSERTION Left 2022    [id number] replamcent tunneld HD cath by Dr. White    HYSTERECTOMY, TOTAL ABDOMINAL (CERVIX REMOVED)      one ovary intact, Dr Fabian, menorrhagia    IR THROMBECTOMY PERCUT AVF  2022    IR THROMBECTOMY PERCUT AVF 2022 [facility] PROCEDURE    IR TUNNELED CATHETER PLACEMENT GREATER THAN 5 YEARS  2022    IR TUNNELED CATHETER PLACEMENT GREATER THAN 5 YEARS 6/3/2022 [facility] PROCEDURE    IR TUNNELED CATHETER PLACEMENT GREATER THAN 5 YEARS Left 2022 Dr. White exchanged to 15.5F x 28 cm.      15.5 fr by 23 cm Symetrex dialysis catheter inserted by Dr White    IR TUNNELED CATHETER PLACEMENT GREATER THAN 5 YEARS  2022    IR TUNNELED CATHETER PLACEMENT GREATER THAN 5 YEARS 2022 [facility]  Electronically signed by Irais Nath on 4/3/24 at 12:04 PM EDT    PHYSICIAN SECTION    Prognosis: Fair    Condition at Discharge: Stable    Rehab Potential (if transferring to Rehab): Fair    Recommended Labs or Other Treatments After Discharge: continue IV Cefazolin until 4/16    Physician Certification: I certify the above information and transfer of Michelle Le  is necessary for the continuing treatment of the diagnosis listed and that she requires LTAC for {GREATER/LESS:116831530} 30 days.     Update Admission H&P: No change in H&P    PHYSICIAN SIGNATURE:  Electronically signed by WILEY BARROW MD on 4/12/24 at 1:09 PM EDT

## 2024-04-04 ENCOUNTER — ANESTHESIA EVENT (OUTPATIENT)
Dept: OPERATING ROOM | Age: 64
End: 2024-04-04
Payer: MEDICARE

## 2024-04-04 ENCOUNTER — APPOINTMENT (OUTPATIENT)
Dept: GENERAL RADIOLOGY | Age: 64
End: 2024-04-04
Payer: MEDICARE

## 2024-04-04 ENCOUNTER — ANESTHESIA (OUTPATIENT)
Dept: OPERATING ROOM | Age: 64
End: 2024-04-04
Payer: MEDICARE

## 2024-04-04 ENCOUNTER — PREP FOR PROCEDURE (OUTPATIENT)
Dept: SURGERY | Age: 64
End: 2024-04-04

## 2024-04-04 DIAGNOSIS — Z78.9 LACK OF INTRAVENOUS ACCESS: ICD-10-CM

## 2024-04-04 PROBLEM — L89.95: Status: ACTIVE | Noted: 2024-04-04

## 2024-04-04 LAB
ALBUMIN SERPL-MCNC: 4 G/DL (ref 3.5–4.6)
ALP SERPL-CCNC: 84 U/L (ref 40–130)
ALT SERPL-CCNC: <5 U/L (ref 0–33)
ANION GAP SERPL CALCULATED.3IONS-SCNC: 15 MEQ/L (ref 9–15)
AST SERPL-CCNC: 14 U/L (ref 0–35)
BASOPHILS # BLD: 0 K/UL (ref 0–0.2)
BASOPHILS NFR BLD: 0.3 %
BILIRUB SERPL-MCNC: 1.1 MG/DL (ref 0.2–0.7)
BUN SERPL-MCNC: 29 MG/DL (ref 8–23)
CALCIUM SERPL-MCNC: 8.7 MG/DL (ref 8.5–9.9)
CHLORIDE SERPL-SCNC: 92 MEQ/L (ref 95–107)
CO2 SERPL-SCNC: 29 MEQ/L (ref 20–31)
CREAT SERPL-MCNC: 5.79 MG/DL (ref 0.5–0.9)
EOSINOPHIL # BLD: 0.8 K/UL (ref 0–0.7)
EOSINOPHIL NFR BLD: 5.9 %
ERYTHROCYTE [DISTWIDTH] IN BLOOD BY AUTOMATED COUNT: 16.9 % (ref 11.5–14.5)
GLOBULIN SER CALC-MCNC: 2.7 G/DL (ref 2.3–3.5)
GLUCOSE BLD-MCNC: 122 MG/DL (ref 70–99)
GLUCOSE BLD-MCNC: 123 MG/DL (ref 70–99)
GLUCOSE BLD-MCNC: 144 MG/DL (ref 70–99)
GLUCOSE BLD-MCNC: 204 MG/DL (ref 70–99)
GLUCOSE SERPL-MCNC: 137 MG/DL (ref 70–99)
HCT VFR BLD AUTO: 36.9 % (ref 37–47)
HGB BLD-MCNC: 12.5 G/DL (ref 12–16)
LYMPHOCYTES # BLD: 1.3 K/UL (ref 1–4.8)
LYMPHOCYTES NFR BLD: 9.9 %
MAGNESIUM SERPL-MCNC: 2.1 MG/DL (ref 1.7–2.4)
MCH RBC QN AUTO: 33.6 PG (ref 27–31.3)
MCHC RBC AUTO-ENTMCNC: 33.9 % (ref 33–37)
MCV RBC AUTO: 99.2 FL (ref 79.4–94.8)
MONOCYTES # BLD: 1 K/UL (ref 0.2–0.8)
MONOCYTES NFR BLD: 7.9 %
NEUTROPHILS # BLD: 9.8 K/UL (ref 1.4–6.5)
NEUTS SEG NFR BLD: 75.7 %
PERFORMED ON: ABNORMAL
PLATELET # BLD AUTO: 117 K/UL (ref 130–400)
POTASSIUM SERPL-SCNC: 3.1 MEQ/L (ref 3.4–4.9)
PROCALCITONIN SERPL IA-MCNC: 0.31 NG/ML (ref 0–0.15)
PROT SERPL-MCNC: 6.7 G/DL (ref 6.3–8)
RBC # BLD AUTO: 3.72 M/UL (ref 4.2–5.4)
SODIUM SERPL-SCNC: 136 MEQ/L (ref 135–144)
WBC # BLD AUTO: 13 K/UL (ref 4.8–10.8)

## 2024-04-04 PROCEDURE — 2709999900 HC NON-CHARGEABLE SUPPLY: Performed by: COLON & RECTAL SURGERY

## 2024-04-04 PROCEDURE — 84145 PROCALCITONIN (PCT): CPT

## 2024-04-04 PROCEDURE — 2500000003 HC RX 250 WO HCPCS: Performed by: NURSE ANESTHETIST, CERTIFIED REGISTERED

## 2024-04-04 PROCEDURE — 2700000000 HC OXYGEN THERAPY PER DAY

## 2024-04-04 PROCEDURE — 85025 COMPLETE CBC W/AUTO DIFF WBC: CPT

## 2024-04-04 PROCEDURE — 6370000000 HC RX 637 (ALT 250 FOR IP): Performed by: COLON & RECTAL SURGERY

## 2024-04-04 PROCEDURE — 6360000002 HC RX W HCPCS: Performed by: INTERNAL MEDICINE

## 2024-04-04 PROCEDURE — 3700000000 HC ANESTHESIA ATTENDED CARE: Performed by: COLON & RECTAL SURGERY

## 2024-04-04 PROCEDURE — 3600000004 HC SURGERY LEVEL 4 BASE: Performed by: COLON & RECTAL SURGERY

## 2024-04-04 PROCEDURE — 94761 N-INVAS EAR/PLS OXIMETRY MLT: CPT

## 2024-04-04 PROCEDURE — 80053 COMPREHEN METABOLIC PANEL: CPT

## 2024-04-04 PROCEDURE — 2580000003 HC RX 258: Performed by: NURSE ANESTHETIST, CERTIFIED REGISTERED

## 2024-04-04 PROCEDURE — 2000000000 HC ICU R&B

## 2024-04-04 PROCEDURE — 3600000014 HC SURGERY LEVEL 4 ADDTL 15MIN: Performed by: COLON & RECTAL SURGERY

## 2024-04-04 PROCEDURE — A4217 STERILE WATER/SALINE, 500 ML: HCPCS | Performed by: COLON & RECTAL SURGERY

## 2024-04-04 PROCEDURE — 99233 SBSQ HOSP IP/OBS HIGH 50: CPT | Performed by: INTERNAL MEDICINE

## 2024-04-04 PROCEDURE — 99232 SBSQ HOSP IP/OBS MODERATE 35: CPT

## 2024-04-04 PROCEDURE — C1769 GUIDE WIRE: HCPCS | Performed by: COLON & RECTAL SURGERY

## 2024-04-04 PROCEDURE — 76937 US GUIDE VASCULAR ACCESS: CPT | Performed by: COLON & RECTAL SURGERY

## 2024-04-04 PROCEDURE — 77001 FLUOROGUIDE FOR VEIN DEVICE: CPT | Performed by: COLON & RECTAL SURGERY

## 2024-04-04 PROCEDURE — 99291 CRITICAL CARE FIRST HOUR: CPT | Performed by: INTERNAL MEDICINE

## 2024-04-04 PROCEDURE — 71045 X-RAY EXAM CHEST 1 VIEW: CPT

## 2024-04-04 PROCEDURE — 6360000002 HC RX W HCPCS: Performed by: NURSE ANESTHETIST, CERTIFIED REGISTERED

## 2024-04-04 PROCEDURE — 36415 COLL VENOUS BLD VENIPUNCTURE: CPT

## 2024-04-04 PROCEDURE — 2580000003 HC RX 258: Performed by: INTERNAL MEDICINE

## 2024-04-04 PROCEDURE — 2500000003 HC RX 250 WO HCPCS: Performed by: INTERNAL MEDICINE

## 2024-04-04 PROCEDURE — 6360000002 HC RX W HCPCS: Performed by: COLON & RECTAL SURGERY

## 2024-04-04 PROCEDURE — 6370000000 HC RX 637 (ALT 250 FOR IP): Performed by: INTERNAL MEDICINE

## 2024-04-04 PROCEDURE — 83735 ASSAY OF MAGNESIUM: CPT

## 2024-04-04 PROCEDURE — 3700000001 HC ADD 15 MINUTES (ANESTHESIA): Performed by: COLON & RECTAL SURGERY

## 2024-04-04 PROCEDURE — C1894 INTRO/SHEATH, NON-LASER: HCPCS | Performed by: COLON & RECTAL SURGERY

## 2024-04-04 PROCEDURE — 2580000003 HC RX 258: Performed by: COLON & RECTAL SURGERY

## 2024-04-04 PROCEDURE — 94640 AIRWAY INHALATION TREATMENT: CPT

## 2024-04-04 PROCEDURE — 02HV33Z INSERTION OF INFUSION DEVICE INTO SUPERIOR VENA CAVA, PERCUTANEOUS APPROACH: ICD-10-PCS | Performed by: COLON & RECTAL SURGERY

## 2024-04-04 PROCEDURE — C1751 CATH, INF, PER/CENT/MIDLINE: HCPCS | Performed by: COLON & RECTAL SURGERY

## 2024-04-04 PROCEDURE — 36561 INSERT TUNNELED CV CATH: CPT | Performed by: COLON & RECTAL SURGERY

## 2024-04-04 PROCEDURE — 82533 TOTAL CORTISOL: CPT

## 2024-04-04 PROCEDURE — 99232 SBSQ HOSP IP/OBS MODERATE 35: CPT | Performed by: INTERNAL MEDICINE

## 2024-04-04 PROCEDURE — 2500000003 HC RX 250 WO HCPCS: Performed by: COLON & RECTAL SURGERY

## 2024-04-04 DEVICE — POWERPORT CLEARVUE SLIM IMPLANTABLE PORT WITH ATTACHABLE 8F POLYURETHANE OPEN-ENDED SINGLE-LUMEN VENOUS CATHETER INTERMEDIATE KIT
Type: IMPLANTABLE DEVICE | Status: FUNCTIONAL
Brand: POWERPORT CLEARVUE

## 2024-04-04 RX ORDER — SODIUM CHLORIDE 9 MG/ML
INJECTION, SOLUTION INTRAVENOUS PRN
Status: DISCONTINUED | OUTPATIENT
Start: 2024-04-04 | End: 2024-04-04 | Stop reason: HOSPADM

## 2024-04-04 RX ORDER — ONDANSETRON 2 MG/ML
INJECTION INTRAMUSCULAR; INTRAVENOUS PRN
Status: DISCONTINUED | OUTPATIENT
Start: 2024-04-04 | End: 2024-04-04 | Stop reason: SDUPTHER

## 2024-04-04 RX ORDER — HYDROCODONE BITARTRATE AND ACETAMINOPHEN 5; 325 MG/1; MG/1
1 TABLET ORAL EVERY 4 HOURS PRN
Status: DISCONTINUED | OUTPATIENT
Start: 2024-04-04 | End: 2024-04-12 | Stop reason: HOSPADM

## 2024-04-04 RX ORDER — SODIUM CHLORIDE 9 MG/ML
INJECTION, SOLUTION INTRAVENOUS CONTINUOUS PRN
Status: DISCONTINUED | OUTPATIENT
Start: 2024-04-04 | End: 2024-04-04 | Stop reason: SDUPTHER

## 2024-04-04 RX ORDER — LIDOCAINE HYDROCHLORIDE 20 MG/ML
INJECTION, SOLUTION INTRAVENOUS PRN
Status: DISCONTINUED | OUTPATIENT
Start: 2024-04-04 | End: 2024-04-04 | Stop reason: SDUPTHER

## 2024-04-04 RX ORDER — SODIUM CHLORIDE 9 MG/ML
INJECTION, SOLUTION INTRAVENOUS
Status: COMPLETED
Start: 2024-04-04 | End: 2024-04-04

## 2024-04-04 RX ORDER — SUCCINYLCHOLINE/SOD CL,ISO/PF 100 MG/5ML
SYRINGE (ML) INTRAVENOUS PRN
Status: DISCONTINUED | OUTPATIENT
Start: 2024-04-04 | End: 2024-04-04 | Stop reason: SDUPTHER

## 2024-04-04 RX ORDER — SODIUM CHLORIDE 9 MG/ML
INJECTION, SOLUTION INTRAVENOUS PRN
Status: CANCELLED | OUTPATIENT
Start: 2024-04-04

## 2024-04-04 RX ORDER — INSULIN GLARGINE 100 [IU]/ML
15 INJECTION, SOLUTION SUBCUTANEOUS 2 TIMES DAILY
Status: DISCONTINUED | OUTPATIENT
Start: 2024-04-04 | End: 2024-04-09

## 2024-04-04 RX ORDER — FENTANYL CITRATE 50 UG/ML
INJECTION, SOLUTION INTRAMUSCULAR; INTRAVENOUS PRN
Status: DISCONTINUED | OUTPATIENT
Start: 2024-04-04 | End: 2024-04-04 | Stop reason: SDUPTHER

## 2024-04-04 RX ORDER — HEPARIN 100 UNIT/ML
SYRINGE INTRAVENOUS PRN
Status: DISCONTINUED | OUTPATIENT
Start: 2024-04-04 | End: 2024-04-04 | Stop reason: ALTCHOICE

## 2024-04-04 RX ORDER — HYDROCODONE BITARTRATE AND ACETAMINOPHEN 5; 325 MG/1; MG/1
2 TABLET ORAL EVERY 4 HOURS PRN
Status: DISCONTINUED | OUTPATIENT
Start: 2024-04-04 | End: 2024-04-12 | Stop reason: HOSPADM

## 2024-04-04 RX ORDER — SODIUM CHLORIDE 0.9 % (FLUSH) 0.9 %
5-40 SYRINGE (ML) INJECTION PRN
Status: CANCELLED | OUTPATIENT
Start: 2024-04-04

## 2024-04-04 RX ORDER — LIDOCAINE HYDROCHLORIDE AND EPINEPHRINE 10; 10 MG/ML; UG/ML
INJECTION, SOLUTION INFILTRATION; PERINEURAL PRN
Status: DISCONTINUED | OUTPATIENT
Start: 2024-04-04 | End: 2024-04-04 | Stop reason: ALTCHOICE

## 2024-04-04 RX ORDER — MAGNESIUM HYDROXIDE 1200 MG/15ML
LIQUID ORAL CONTINUOUS PRN
Status: COMPLETED | OUTPATIENT
Start: 2024-04-04 | End: 2024-04-04

## 2024-04-04 RX ORDER — SODIUM CHLORIDE 0.9 % (FLUSH) 0.9 %
5-40 SYRINGE (ML) INJECTION PRN
Status: DISCONTINUED | OUTPATIENT
Start: 2024-04-04 | End: 2024-04-04 | Stop reason: HOSPADM

## 2024-04-04 RX ORDER — PROPOFOL 10 MG/ML
INJECTION, EMULSION INTRAVENOUS PRN
Status: DISCONTINUED | OUTPATIENT
Start: 2024-04-04 | End: 2024-04-04 | Stop reason: SDUPTHER

## 2024-04-04 RX ORDER — SODIUM CHLORIDE 0.9 % (FLUSH) 0.9 %
5-40 SYRINGE (ML) INJECTION EVERY 12 HOURS SCHEDULED
Status: DISCONTINUED | OUTPATIENT
Start: 2024-04-04 | End: 2024-04-04 | Stop reason: HOSPADM

## 2024-04-04 RX ORDER — SODIUM CHLORIDE 0.9 % (FLUSH) 0.9 %
5-40 SYRINGE (ML) INJECTION EVERY 12 HOURS SCHEDULED
Status: CANCELLED | OUTPATIENT
Start: 2024-04-04

## 2024-04-04 RX ADMIN — ONDANSETRON 4 MG: 2 INJECTION INTRAMUSCULAR; INTRAVENOUS at 15:54

## 2024-04-04 RX ADMIN — ALBUTEROL SULFATE 2.5 MG: 2.5 SOLUTION RESPIRATORY (INHALATION) at 16:16

## 2024-04-04 RX ADMIN — MICONAZOLE NITRATE: 2 POWDER TOPICAL at 16:31

## 2024-04-04 RX ADMIN — SEVELAMER CARBONATE 800 MG: 800 TABLET, FILM COATED ORAL at 07:54

## 2024-04-04 RX ADMIN — INSULIN GLARGINE 15 UNITS: 100 INJECTION, SOLUTION SUBCUTANEOUS at 20:30

## 2024-04-04 RX ADMIN — FENTANYL CITRATE 25 MCG: 50 INJECTION, SOLUTION INTRAMUSCULAR; INTRAVENOUS at 15:20

## 2024-04-04 RX ADMIN — PROPOFOL 120 MG: 10 INJECTION, EMULSION INTRAVENOUS at 14:48

## 2024-04-04 RX ADMIN — PANTOPRAZOLE SODIUM 20 MG: 20 TABLET, DELAYED RELEASE ORAL at 07:50

## 2024-04-04 RX ADMIN — HYDROXYZINE HYDROCHLORIDE 25 MG: 25 TABLET ORAL at 07:50

## 2024-04-04 RX ADMIN — INSULIN GLARGINE 10 UNITS: 100 INJECTION, SOLUTION SUBCUTANEOUS at 08:05

## 2024-04-04 RX ADMIN — FENTANYL CITRATE 25 MCG: 50 INJECTION, SOLUTION INTRAMUSCULAR; INTRAVENOUS at 15:35

## 2024-04-04 RX ADMIN — ALBUTEROL SULFATE 2.5 MG: 2.5 SOLUTION RESPIRATORY (INHALATION) at 03:08

## 2024-04-04 RX ADMIN — SEVELAMER CARBONATE 800 MG: 800 TABLET, FILM COATED ORAL at 14:20

## 2024-04-04 RX ADMIN — CEFAZOLIN 2000 MG: 2 INJECTION, POWDER, FOR SOLUTION INTRAMUSCULAR; INTRAVENOUS at 06:19

## 2024-04-04 RX ADMIN — MICONAZOLE NITRATE: 2 POWDER TOPICAL at 20:33

## 2024-04-04 RX ADMIN — MIDODRINE HYDROCHLORIDE 20 MG: 10 TABLET ORAL at 14:20

## 2024-04-04 RX ADMIN — PHENYLEPHRINE HYDROCHLORIDE 100 MCG: 10 INJECTION INTRAVENOUS at 15:14

## 2024-04-04 RX ADMIN — Medication 80 MG: at 14:48

## 2024-04-04 RX ADMIN — MUPIROCIN: 20 OINTMENT TOPICAL at 20:33

## 2024-04-04 RX ADMIN — PHENYLEPHRINE HYDROCHLORIDE 100 MCG: 10 INJECTION INTRAVENOUS at 15:07

## 2024-04-04 RX ADMIN — PHENYLEPHRINE HYDROCHLORIDE 100 MCG: 10 INJECTION INTRAVENOUS at 15:26

## 2024-04-04 RX ADMIN — SODIUM CHLORIDE: 9 INJECTION, SOLUTION INTRAVENOUS at 14:48

## 2024-04-04 RX ADMIN — ATORVASTATIN CALCIUM 40 MG: 40 TABLET, FILM COATED ORAL at 20:30

## 2024-04-04 RX ADMIN — MUPIROCIN: 20 OINTMENT TOPICAL at 16:30

## 2024-04-04 RX ADMIN — NOREPINEPHRINE BITARTRATE 7 MCG/MIN: 1 INJECTION, SOLUTION, CONCENTRATE INTRAVENOUS at 14:56

## 2024-04-04 RX ADMIN — SERTRALINE HYDROCHLORIDE 50 MG: 50 TABLET ORAL at 20:30

## 2024-04-04 RX ADMIN — PHENYLEPHRINE HYDROCHLORIDE 100 MCG: 10 INJECTION INTRAVENOUS at 15:13

## 2024-04-04 RX ADMIN — CEFAZOLIN 2000 MG: 2 INJECTION, POWDER, FOR SOLUTION INTRAMUSCULAR; INTRAVENOUS at 15:03

## 2024-04-04 RX ADMIN — LIDOCAINE HYDROCHLORIDE 100 MG: 20 INJECTION, SOLUTION INTRAVENOUS at 14:48

## 2024-04-04 RX ADMIN — ONDANSETRON 4 MG: 2 INJECTION INTRAMUSCULAR; INTRAVENOUS at 08:26

## 2024-04-04 RX ADMIN — SEVELAMER CARBONATE 800 MG: 800 TABLET, FILM COATED ORAL at 20:30

## 2024-04-04 RX ADMIN — Medication 10 MCG/MIN: at 00:23

## 2024-04-04 RX ADMIN — MIDODRINE HYDROCHLORIDE 20 MG: 10 TABLET ORAL at 17:37

## 2024-04-04 RX ADMIN — APIXABAN 2.5 MG: 2.5 TABLET, FILM COATED ORAL at 07:51

## 2024-04-04 RX ADMIN — MIDODRINE HYDROCHLORIDE 20 MG: 10 TABLET ORAL at 07:51

## 2024-04-04 RX ADMIN — ASPIRIN 81 MG: 81 TABLET, COATED ORAL at 07:50

## 2024-04-04 RX ADMIN — PHENYLEPHRINE HYDROCHLORIDE 100 MCG: 10 INJECTION INTRAVENOUS at 15:08

## 2024-04-04 RX ADMIN — HYDROXYZINE HYDROCHLORIDE 25 MG: 25 TABLET ORAL at 20:30

## 2024-04-04 RX ADMIN — ARIPIPRAZOLE 5 MG: 5 TABLET ORAL at 07:54

## 2024-04-04 ASSESSMENT — PAIN SCALES - GENERAL
PAINLEVEL_OUTOF10: 0

## 2024-04-04 NOTE — PLAN OF CARE
Problem: Discharge Planning  Goal: Discharge to home or other facility with appropriate resources  Outcome: Progressing     Problem: Safety - Adult  Goal: Free from fall injury  Outcome: Progressing     Problem: Skin/Tissue Integrity  Goal: Absence of new skin breakdown  Description: 1.  Monitor for areas of redness and/or skin breakdown  2.  Assess vascular access sites hourly  3.  Every 4-6 hours minimum:  Change oxygen saturation probe site  4.  Every 4-6 hours:  If on nasal continuous positive airway pressure, respiratory therapy assess nares and determine need for appliance change or resting period.  Outcome: Progressing     Problem: Chronic Conditions and Co-morbidities  Goal: Patient's chronic conditions and co-morbidity symptoms are monitored and maintained or improved  Outcome: Progressing     Problem: Pain  Goal: Verbalizes/displays adequate comfort level or baseline comfort level  Outcome: Progressing     Problem: Respiratory - Adult  Goal: Achieves optimal ventilation and oxygenation  Outcome: Progressing     Problem: Cardiovascular - Adult  Goal: Maintains optimal cardiac output and hemodynamic stability  Outcome: Progressing  Goal: Absence of cardiac dysrhythmias or at baseline  Outcome: Progressing     Problem: Skin/Tissue Integrity - Adult  Goal: Skin integrity remains intact  Outcome: Progressing  Goal: Incisions, wounds, or drain sites healing without S/S of infection  Outcome: Progressing     Problem: Musculoskeletal - Adult  Goal: Return mobility to safest level of function  Outcome: Progressing     Problem: Gastrointestinal - Adult  Goal: Minimal or absence of nausea and vomiting  Outcome: Progressing  Goal: Maintains or returns to baseline bowel function  Outcome: Progressing  Goal: Maintains adequate nutritional intake  Outcome: Progressing     Problem: Genitourinary - Adult  Goal: Absence of urinary retention  Outcome: Progressing     Problem: Infection - Adult  Goal: Absence of infection

## 2024-04-04 NOTE — BRIEF OP NOTE
Brief Postoperative Note      Patient: Michelle Le  YOB: 1960  MRN: [id number]    Date of Procedure: 4/4/2024    Pre-Op Diagnosis Codes:     * Lack of intravenous access [Z78.9]    Post-Op Diagnosis: Same       Procedure: Tunneled right subclavian dual-lumen Kenyon cath    Surgeon(s):  Joss Denise MD      Anesthesia: General, local    Estimated Blood Loss (mL): 30    Complications: None    Specimens:   * No specimens in log *    Implants:  Implant Name Type Inv. Item Serial No.  Lot No. LRB No. Used Action   PORT IMPL INFUSION 8 FR AIRGUARD VLV INTRO POWERPORT - [id number]  PORT IMPL INFUSION 8 FR AIRGUARD VLV INTRO POWERPORT  [employer]- [id number] Right 1 Implanted         Drains: * No LDAs found *    Findings:  None patent internal jugular system, right subclavian dual-lumen Kenyon catheter placed contralateral to the left sided hemodialysis fistula    Electronically signed by JOSS DENISE MD on 4/4/2024 at 4:04 PM

## 2024-04-04 NOTE — PROGRESS NOTES
Assessments per flowsheets.  Medications per MAR.    AM assessment complete.  Pt A&Ox4.  Pt to receive dialysis today.  Spoke with specials.  Kenyon cannot be placed today due to Dr. White's schedule.  Pt requesting enema.  Small emesis of clear bile noted.    Around 0900 pt had medium soft/loose stool.     Around 10 am spoke with IR.  Dr Maurer unable to place the Kenyon today.  Unsure if able to place tomorrow also due to full schedule.  They suggest general surgery be consulted to see if they can place.      Approx 1130 pt states she is feeling better and would like to eat.  Spoke with Dr Cintron and order to restart previous diet given.    Around 1144 - messaged Dr GOMES regarding Kenyon.       Around 1430 Pt went to surgery for Kenyon.      Approx 1610 pt returned from surgery.  A&Ox4.  Kenyon working well.  Dressings to last arms changed.  Triad placed on coccyx as ordered.      Pt has as 1 medium loose stool and 2 small stools this shift.  Pt is asking to wait on the enema to see if she continues to go on her own.

## 2024-04-04 NOTE — PROGRESS NOTES
Cardiology progress note    Patient Name: Michelle Le  Admit Date: 3/29/2024 10:23 AM  MR #: [id number]  : 1960    Attending Physician: Adali Cintron MD  Reason for consult: Hypotension    History of Presenting Illness:      Michelle Le is a 64 y.o. female on hospital day 6 with a history of CAD prior PCI to the LAD, CABG LIMA to LAD and tricuspid valve repair complicated by tamponade and pericardial window, end-stage renal disease on hemodialysis, hypertension, hyperlipidemia, atrial fibrillation on Eliquis admitted to the hospital for shortness of breath. History Obtained From:  patient, electronic medical record    Patient hyperkalemic on admission 6.4  EKG atrial flutter 58 bpm  Troponins flat 130  =================  Hospital course  3/31/2024  Patient now in ICU due to hypotension  Currently patient on Levophed  Patient looks comfortable denies chest pain or shortness of breath  Still hyperkalemic but condition improving    2024: Patient is resting comfortably in bed and appears to be in no acute distress at this time.  She is on room air.  She is talking to me comfortably without any distress.  Patient went for dialysis today, levo was started during dialysis.  Patient worried about her blood pressures.  Patient denies any chest pain, or other anginal type symptoms.  Reports that her shortness of breath is at baseline and feels a little dizzy during time of examination.  Creatinine 5.56, GFR 7.9, hemoglobin 12.4, potassium within normal range  Chest x[facility] from [facility] showed no right or left pleural effusion  Echo from 3/6/2024 showed EF of 55 to 60%.    2024: Patient is resting comfortably in bed during time of examination.  She is receiving hemodialysis.  She appears to be in no acute distress.  She is talking to me comfortably.  She is on 2 L of oxygen via nasal cannula.  Patient reports that she is worried about her low blood pressures.  Remains on midodrine and levo  Patient reports

## 2024-04-04 NOTE — ANESTHESIA POSTPROCEDURE EVALUATION
Department of Anesthesiology  Postprocedure Note    Patient: Michelle Le  MRN: [id number]  YOB: 1960  Date of evaluation: 4/4/2024    Procedure Summary       Date: 04/04/24 Room / Location: [address]    Anesthesia Start: 1448 Anesthesia Stop: 1612    Procedure: Tunneled central venous catheter ultrasound-guided. Room ICU-05. (Right) Diagnosis:       Lack of intravenous access      (Lack of intravenous access [Z78.9])    Surgeons: Joss Denise MD Responsible Provider: Luda Dennis MD    Anesthesia Type: general ASA Status: 4 - Emergent            Anesthesia Type: No value filed.    Gaby Phase I: Gaby Score: 6    Gaby Phase II:      Anesthesia Post Evaluation    Patient location during evaluation: bedside  Patient participation: complete - patient participated  Level of consciousness: awake and awake and alert  Pain score: 0  Airway patency: patent  Nausea & Vomiting: no nausea and no vomiting  Cardiovascular status: blood pressure returned to baseline and hemodynamically stable  Respiratory status: acceptable  Hydration status: euvolemic  Pain management: adequate        No notable events documented.

## 2024-04-04 NOTE — PROGRESS NOTES
Wound Follow-up Progress Note       NAME:  Michelle Le  MEDICAL RECORD NUMBER:  [id number]  AGE:  64 y.o.   GENDER:  female  :  1960  TODAY'S DATE:  2024    Subjective:   Wound Identification:  Wound Type: pressure  Contributing Factors: chronic pressure, decreased mobility, and obesity        Patient Goal of Care:  [x] Wound Healing  [] Odor Control  [] Palliative Care  [] Pain Control   [] Other:     Objective:    BP (!) 88/38   Pulse 91   Temp 98.1 °F (36.7 °C) (Oral)   Resp 16   Ht 1.7 m (5' 6.93\")   Wt 101.2 kg (223 lb)   LMP  (LMP Unknown)   SpO2 98%   BMI 35.00 kg/m²   Ozzy Risk Score: Ozzy Scale Score: 12  Assessment:   Measurements:  Wound 10/25/23 Arm Left;Lower;Posterior #1 left forearm (Active)   Wound Etiology Other 24 1050   Dressing Status Clean;Dry;Intact;Reinforced dressing 24 0400   Wound Cleansed Not Cleansed 24 0400   Dressing/Treatment Dry dressing 24 0400   Wound Assessment Pink/red 24 0400   Drainage Amount None (dry) 24 0400   Odor None 24 0400   Mely-wound Assessment Warm;Dry/flaky 24 1050   Margins Defined edges 24 0400   Wound Thickness Description not for Pressure Injury Full thickness 24 1050   Number of days: 162       Wound 10/25/23 Arm Lower;Posterior;Right #2 right forearm (Active)   Wound Etiology Other 24 1050   Dressing Status Clean;Dry;Intact 24 0400   Wound Cleansed Other (Comment) 24 0400   Dressing/Treatment Dry dressing 24 0400   Wound Assessment Pink/red 24 0400   Drainage Amount None (dry) 24 0400   Odor None 24 0400   Mely-wound Assessment Warm 24 1050   Margins Defined edges 24 0400   Wound Thickness Description not for Pressure Injury Full thickness 24 1050   Number of days: 162       Wound 23 Hand Left;Posterior (Active)   Wound Etiology Other 24 1050   Dressing Status

## 2024-04-04 NOTE — OP NOTE
[facility]                   [address] [place] [address]                            OPERATIVE REPORT      PATIENT NAME: VELASQUEZ GARBER            : 1960  MED REC NO: [id number]                        ROOM: [facility]  ACCOUNT NO: [id number]                       ADMIT DATE: 2024  PROVIDER: Joss Denise MD      DATE OF PROCEDURE:  2024    SURGEON:  Joss Denise MD    PREOPERATIVE DIAGNOSIS:  Lack of intravenous access.    POSTOPERATIVE DIAGNOSIS:  Lack of intravenous access.    PROCEDURE:  Right subclavian dual lumen Kenyon catheter placement.    ANESTHESIA:    1. General endotracheal anesthesia.  2. Local.    ESTIMATED BLOOD LOSS:  30.    SPECIMEN:  None.    COMPLICATIONS:  Inability to place internal jugular catheter.    INDICATIONS:  This is a 64-year-old female, who is struggling this hospital stay with lack of intravenous access.  Multiple attempts have been made unsuccessfully.  I was asked to place a Kenyon catheter for assistance with IV fluids and further treatment.  Risks and benefits were explained to the patient.  Risks of infection, bleeding, collapsed lungs, and catheter complications were all addressed.    Despite the risks, she wished to proceed.  Consent obtained.    DESCRIPTION OF PROCEDURE:  She was taken to the operating room and placed in the supine position.  General endotracheal anesthesia was administered.  Her right neck and chest were prepped and draped with a ChloraPrep-containing solution.  I stayed on the contralateral side to her current left arm AV fistula.    The ultrasound was used to identify a small internal jugular vein in the right neck.  Using several attempts for access as well as several wires including the Glidewire, there was no way to pass a wire down the internal jugular vein.  It appeared to be either strictured or clotted to the point where it was not accessible.  Given her left-sided AV fistula, I  elected to proceed with a right subclavian dual lumen Kenyon catheter.  The subclavian vein was easily accessed, and a wire was placed without difficulty and confirmed in position with fluoroscopy.  A site on the anterior chest wall was made, and the catheter was tunneled from that site to the exit site of the guidewire.  The catheter was tunneled to the appropriate length.  Using a dilator and sheath, it was inserted over the wire.  The dilator and wire were removed, and the catheter was inserted through the pull away sheath.  The catheter was in good position.  It aspirated on both ports without problems and flushed.  Heparin saline was used as a lock.  It was sutured to the skin and dressings were applied.    The patient was taken back to the intensive care unit for postop monitoring.          NANETTE TRAYLOR MD      D:  04/04/2024 16:11:58     T:  04/04/2024 16:30:54     MIKE/JEREMIE  Job #:  [id number]     Doc#:  [id number]

## 2024-04-04 NOTE — ANESTHESIA PRE PROCEDURE
04/04/24 1200   BP: (!) 92/40 (!) 100/52 (!) 111/41 (!) 88/38   Pulse: 88 91 95 91   Resp: 12 14 21 16   Temp:    98.1 °F (36.7 °C)   TempSrc:    Oral   SpO2: 97% 99% 97% 98%   Weight:       Height:                                                  BP Readings from Last 3 Encounters:   04/04/24 (!) 88/38   03/09/24 136/78   02/26/24 118/68       NPO Status:                                                                                 BMI:   Wt Readings from Last 3 Encounters:   04/03/24 101.2 kg (223 lb)   03/09/24 99.2 kg (218 lb 11.1 oz)   02/26/24 98 kg (216 lb)     Body mass index is 35 kg/m².    CBC:   Lab Results   Component Value Date/Time    WBC 13.0 04/04/2024 04:27 AM    RBC 3.72 04/04/2024 04:27 AM    HGB 12.5 04/04/2024 04:27 AM    HCT 36.9 04/04/2024 04:27 AM    MCV 99.2 04/04/2024 04:27 AM    RDW 16.9 04/04/2024 04:27 AM     04/04/2024 04:27 AM       CMP:   Lab Results   Component Value Date/Time     04/04/2024 04:27 AM    K 3.1 04/04/2024 04:27 AM    K 3.6 04/03/2024 07:48 AM    CL 92 04/04/2024 04:27 AM    CO2 29 04/04/2024 04:27 AM    BUN 29 04/04/2024 04:27 AM    CREATININE 5.79 04/04/2024 04:27 AM    GFRAA 17.5 10/06/2022 11:15 PM    LABGLOM 7.5 04/04/2024 04:27 AM    GLUCOSE 137 04/04/2024 04:27 AM    GLUCOSE 90 10/24/2022 10:43 AM    PROT 6.7 04/04/2024 04:27 AM    CALCIUM 8.7 04/04/2024 04:27 AM    BILITOT 1.1 04/04/2024 04:27 AM    ALKPHOS 84 04/04/2024 04:27 AM    AST 14 04/04/2024 04:27 AM    ALT <5 04/04/2024 04:27 AM       POC Tests:   Recent Labs     04/04/24  1203   POCGLU 144*       Coags:   Lab Results   Component Value Date/Time    PROTIME 21.3 03/29/2024 10:45 AM    INR 1.8 03/29/2024 10:45 AM    APTT 34.1 03/29/2024 10:45 AM       HCG (If Applicable):   Lab Results   Component Value Date    PREGTESTUR hyster 10/08/2017        ABGs:   Lab Results   Component Value Date/Time    PHART 7.506 03/29/2024 11:07 AM    PO2ART 55 03/29/2024 11:07 AM    NVN3BDL 37 03/29/2024

## 2024-04-04 NOTE — PROGRESS NOTES
Renal Progress Note    Assessment and Plan:   64-year-old lady with end-stage renal disease on hemodialysis Tuesday Thursday Saturday.  Chronic hypotension maintained on large dose midodrine.  Admitted with SOB.  Sig above dry weight.  Doesn't make urine.  EF is normal.  Transferred to ICU with hypotension.      Plan/  1-hd today and tomorrow as sig fluid overloaded  2. Try to get off levo after that  3. D/c lokelma.  3k bath  4. Ok for eliquis       Patient Active Problem List:     Coronary artery disease involving native coronary artery of native heart with angina pectoris (HCC)     Schizophrenia, paranoid, chronic     Metabolic syndrome     Vitamin B 12 deficiency     Cerebral microvascular disease     Mixed hyperlipidemia     Other hammer toe (acquired)     Vitamin D insufficiency     Incontinence of urine     Diabetic nephropathy with proteinuria (HCC)     Essential (primary) hypertension     History of type C viral hepatitis     Urinary incontinence due to cognitive impairment     History of seizures     Stented coronary artery-plan is to stay on Plavix indefinately per Dr Walker     Diabetes mellitus ([facility])     Controlled type 2 diabetes mellitus with diabetic neuropathy, with long-term current use of insulin ([facility])     Hemiparesis, left ([facility])     Angina, class II ([facility])     Pain, unspecified     Tardive dyskinesia     Shortness of breath     Uncontrolled type 2 diabetes mellitus with hyperglycemia (HCC)     Chronic diastolic congestive heart failure (HCC)     [place] (obstructive sleep apnea)     Pulmonary hypertension, unspecified ([facility])     Class 2 severe obesity with serious comorbidity and body mass index (BMI) of 36.0 to 36.9 in adult (HCC)     Edema     Closed supracondylar fracture of right humerus     Other chronic pain     Palliative care patient     Recurrent falls     Renal failure     Difficulty in walking     ESRD (end stage renal disease) on dialysis (HCC)     Weakness     Other seizures ([facility])

## 2024-04-04 NOTE — PROGRESS NOTES
Michelle Le  1960  female  Medical Record Number: [id number]    Patient informed that I am an Infectious Disease physician and permission obtained from the patient to speak in front of any visitors prior to any discussion for HIPPA purposes.     HPI:  Patient admitted with shortness of breath.  She is a dialysis patient.  She has been picking at her skin and has multiple self-inflicted wounds.  Noted to be hypoxic with hypotension on admission  Started on cefazolin empirically.  Cultures from her hand have not grown anything.  She tells me that she picks at her skin when she is nervous.  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 Amoxicillin. She also has a history of dialysis catheter related Enterococcus bacteremia.     All cultures have been negative to date.  Per medical record patient is requiring increased dose of Levophed.  Unclear reason    Physical Exam:  NAD, resting when I came in but arousable  Skin: no new rashes but patient has multiple wounds self-inflicted, no worse.  HEENT: EOMI, MMM, Neck is supple  Heart: S1 S2  Lungs: bilaterally equal expansion  Abdomen: soft, ND, NTTP, +BS  Extrem: No pain in calf region  neuro exam: CN II-XII intact  The excoriations overall are better on her face and her arms.  Left hand still healing    Labs:   I have reviewed all lab results by electronic record, including most recent CBC, metabolic panel, and pertinent abnormalities were addressed from an infectious disease perspective. WBC trends are being monitored.    Lab Results   Component Value Date/Time     04/03/2024 07:48 AM    K 3.6 04/03/2024 07:48 AM    CL 92 04/03/2024 07:48 AM    CO2 29 04/03/2024 07:48 AM    BUN 24 04/03/2024 07:48 AM    CREATININE 4.60 04/03/2024 07:48 AM    GLUCOSE 408

## 2024-04-04 NOTE — PROGRESS NOTES
Pulmonary & Critical Care Medicine ICU Progress Note  Chief complaint : Shock    Subjunctive/24 hour events :   Patient seen and examined during multidisciplinary rounds with RN, charge nurse, RT, pharmacy, dietitian, and social service.      Feeling better, denies chest pain, no shortness of breath, she is complains of constipation, she would like to have an enema, no fever, she is on Levophed at 6 mcg/min, she is on room air saturation 93%.  No nausea or vomiting      New information updated in the note today, rest of the examination did not change compared to yesterday.  Social History     Tobacco Use    Smoking status: Never     Passive exposure: Never    Smokeless tobacco: Never   Substance Use Topics    Alcohol use: No     Alcohol/week: 0.0 standard drinks of alcohol         Problem Relation Age of Onset    Cancer Mother 68        survived    Breast Cancer Mother     Hypertension Father     Diabetes Sister     Mental Illness Sister     Colon Cancer Neg Hx        No results for input(s): \"PHART\", \"[id number]\", \"PO2ART\" in the last 72 hours.    MV Settings:     / / /            IV:   norepinephrine 8 mcg/min (04/04/24 0651)    sodium chloride Stopped (03/30/24 1524)    dextrose         Vitals:  BP (!) 125/46   Pulse 91   Temp 99.7 °F (37.6 °C) (Oral)   Resp 16   Ht 1.7 m (5' 6.93\")   Wt 101.2 kg (223 lb)   LMP  (LMP Unknown)   SpO2 98%   BMI 35.00 kg/m²    Tmax:        Intake/Output Summary (Last 24 hours) at 4/4/2024 0815  Last data filed at 4/4/2024 0651  Gross per 24 hour   Intake 997.17 ml   Output 0 ml   Net 997.17 ml         EXAM:  General: alert, cooperative, no distress  Head: normocephalic, atraumatic  Eyes:No gross abnormalities.  ENT:  MMM no lesions  Neck:  supple and no masses  Chest : clear to auscultation bilaterally- no wheezes, rales or rhonchi, normal air movement, no respiratory distress  Heart:: Heart sounds are normal.  Regular rate and rhythm without murmur, gallop or rub.  ABD:   symmetric, soft, non-tender  Musculoskeletal : no cyanosis, no clubbing, and no edema, swelling right lower extremity  Neuro:  Grossly normal  Skin: No rashes or nodules noted.  Lymph node:  no cervical nodes  Urology: No Reynolds   Psychiatric: appropriate    Medications:  Scheduled Meds:   insulin glargine  10 Units SubCUTAneous BID    midodrine  20 mg Oral TID WC    insulin lispro  0-16 Units SubCUTAneous TID WC    insulin lispro  0-4 Units SubCUTAneous Nightly    mupirocin   Topical BID    ceFAZolin  2,000 mg IntraVENous Q12H    [Held by provider] sodium zirconium cyclosilicate  10 g Oral Daily    hydrOXYzine HCl  25 mg Oral BID    sodium chloride flush  5-40 mL IntraVENous 2 times per day    ARIPiprazole  5 mg Oral Daily    atorvastatin  40 mg Oral Nightly    pantoprazole  20 mg Oral Daily    sevelamer  800 mg Oral TID    apixaban  2.5 mg Oral BID    aspirin EC  81 mg Oral Daily    sertraline  50 mg Oral Nightly    miconazole   Topical BID    albuterol  2.5 mg Nebulization BID RT       PRN Meds:  perflutren lipid microspheres, sennosides-docusate sodium, sodium chloride flush, sodium chloride, ondansetron **OR** ondansetron, polyethylene glycol, acetaminophen **OR** acetaminophen, albumin human 25%, glucose, dextrose bolus **OR** dextrose bolus, glucagon (rDNA), dextrose, albuterol    Results: reviewed by me   CBC:   Recent Labs     04/03/24  0748 04/04/24 0427   WBC 9.8 13.0*   HGB 12.9 12.5   HCT 38.7 36.9*   .5* 99.2*   * 117*       BMP:   Recent Labs     04/02/24  0325 04/03/24  0748 04/04/24  0427   * 135 136   K 5.3* 3.6 3.1*   CL 90* 92* 92*   CO2 22 29 29   BUN 27* 24* 29*   CREATININE 4.60* 4.60* 5.79*       LIVER PROFILE:   Recent Labs     04/03/24  0748 04/04/24 0427   AST 14 14   ALT <5 <5   BILITOT 1.2* 1.1*   ALKPHOS 88 84       PT/INR: No results for input(s): \"PROTIME\", \"INR\" in the last 72 hours.  APTT: No results for input(s): \"APTT\" in the last 72 hours.  UA:No results for

## 2024-04-04 NOTE — PROGRESS NOTES
Progress Note  Date:2024       Room:[place] [address]-01  Patient Name:Michelle Le     YOB: 1960     Age:64 y.o.        Subjective    Subjective:  Symptoms:  She reports weakness.  No shortness of breath, malaise, cough, chest pain, headache, chest pressure, anorexia, diarrhea or anxiety.    Diet:  No nausea or vomiting.       Review of Systems   Respiratory:  Negative for cough and shortness of breath.    Cardiovascular:  Negative for chest pain.   Gastrointestinal:  Negative for anorexia, diarrhea, nausea and vomiting.   Neurological:  Positive for weakness.     Objective         Vitals Last 24 Hours:  TEMPERATURE:  Temp  Av.7 °F (37.1 °C)  Min: 97.8 °F (36.6 °C)  Max: 99.7 °F (37.6 °C)  RESPIRATIONS RANGE: Resp  Av.9  Min: 12  Max: 25  PULSE OXIMETRY RANGE: SpO2  Av.3 %  Min: 85 %  Max: 100 %  PULSE RANGE: Pulse  Av.4  Min: 81  Max: 101  BLOOD PRESSURE RANGE: Systolic (24hrs), Av , Min:56 , Max:138   ; Diastolic (24hrs), Av, Min:27, Max:74    I/O (24Hr):    Intake/Output Summary (Last 24 hours) at 2024 1228  Last data filed at 2024 0830  Gross per 24 hour   Intake 722.51 ml   Output 100 ml   Net 622.51 ml       Objective:  General Appearance:  In no acute distress.    Vital signs: (most recent): Blood pressure (!) 88/38, pulse 91, temperature 98.1 °F (36.7 °C), temperature source Oral, resp. rate 16, height 1.7 m (5' 6.93\"), weight 101.2 kg (223 lb), SpO2 98 %, not currently breastfeeding.    HEENT: Normal HEENT exam.    Lungs:  There are decreased breath sounds.    Heart: S1 normal and S2 normal.  No gallop.   Abdomen: Abdomen is soft.  Bowel sounds are normal.   There is no epigastric area or suprapubic area tenderness.     Extremities: There is no deformity.    Neurological: Patient is alert and oriented to person, place and time.    Pupils:  Pupils are equal, round, and reactive to light.    Skin:  Warm.      Labs/Imaging/Diagnostics    Labs:  CBC:  Recent

## 2024-04-04 NOTE — CARE COORDINATION
[facility] rounded with RN. Pt remain on 6 mcg of LEVO.   DC plan return to [facility]. [facility] have accepted. Does not need pre-cert to return. Pall care following.     LSW will follow.     Electronically signed by HIPOLITO Rendon on 4/4/2024 at 9:33 AM

## 2024-04-04 NOTE — DIALYSIS
Patient tolerated treatment well and without complications. Vitals remained stable with the use of BP supporting medications. AVF functioned well. Patient achieved hemostasis within 10 minutes post tx. 3500ml total net UF removed. Primary RN given report.

## 2024-04-04 NOTE — PROGRESS NOTES
Spiritual Care Services     Summary of Visit:    Encounter Summary  Encounter Overview/Reason : Spiritual/Emotional Needs, Follow-up  Service Provided For:: Patient  Referral/Consult From:: Rounding  Support System: Children  Last Encounter : 04/03/24  Complexity of Encounter: Low  Begin Time: 0800  End Time : 0815  Total Time Calculated: 15 min        Spiritual/Emotional needs  Type: Spiritual Support                      Spiritual Assessment/Intervention/Outcomes:    Assessment: Concerns with suffering    Intervention: Sustaining Presence/Ministry of presence, Active listening    Outcome: Receptive      Care Plan:    Plan and Referrals  Plan/Referrals: Continue Support (comment), Continue to visit, (comment)          Spiritual Care Services   Electronically signed by Chaplain Galen on 4/4/2024 at 1:53 PM.    To reach a  for emotional and spiritual support, place an EPIC consult request.   If a  is needed immediately, dial “0” and ask to page the on-call .

## 2024-04-05 ENCOUNTER — APPOINTMENT (OUTPATIENT)
Dept: ULTRASOUND IMAGING | Age: 64
End: 2024-04-05
Payer: MEDICARE

## 2024-04-05 LAB
ALBUMIN SERPL-MCNC: 3.7 G/DL (ref 3.5–4.6)
ALP SERPL-CCNC: 88 U/L (ref 40–130)
ALT SERPL-CCNC: <5 U/L (ref 0–33)
ANION GAP SERPL CALCULATED.3IONS-SCNC: 17 MEQ/L (ref 9–15)
AST SERPL-CCNC: 14 U/L (ref 0–35)
BASOPHILS # BLD: 0 K/UL (ref 0–0.2)
BASOPHILS NFR BLD: 0.3 %
BILIRUB SERPL-MCNC: 1.2 MG/DL (ref 0.2–0.7)
BUN SERPL-MCNC: 21 MG/DL (ref 8–23)
CALCIUM SERPL-MCNC: 8.4 MG/DL (ref 8.5–9.9)
CHLORIDE SERPL-SCNC: 94 MEQ/L (ref 95–107)
CO2 SERPL-SCNC: 26 MEQ/L (ref 20–31)
CORTISOL COLLECTION INFO: NORMAL
CORTISOL: 14.1 UG/DL (ref 2.5–19.5)
CREAT SERPL-MCNC: 4.56 MG/DL (ref 0.5–0.9)
EOSINOPHIL # BLD: 0.6 K/UL (ref 0–0.7)
EOSINOPHIL NFR BLD: 5.1 %
ERYTHROCYTE [DISTWIDTH] IN BLOOD BY AUTOMATED COUNT: 16.9 % (ref 11.5–14.5)
GLOBULIN SER CALC-MCNC: 3 G/DL (ref 2.3–3.5)
GLUCOSE BLD-MCNC: 125 MG/DL (ref 70–99)
GLUCOSE BLD-MCNC: 195 MG/DL (ref 70–99)
GLUCOSE BLD-MCNC: 99 MG/DL (ref 70–99)
GLUCOSE SERPL-MCNC: 223 MG/DL (ref 70–99)
HCT VFR BLD AUTO: 37 % (ref 37–47)
HGB BLD-MCNC: 12.7 G/DL (ref 12–16)
LYMPHOCYTES # BLD: 1 K/UL (ref 1–4.8)
LYMPHOCYTES NFR BLD: 8.7 %
MAGNESIUM SERPL-MCNC: 2.1 MG/DL (ref 1.7–2.4)
MCH RBC QN AUTO: 33.9 PG (ref 27–31.3)
MCHC RBC AUTO-ENTMCNC: 34.3 % (ref 33–37)
MCV RBC AUTO: 98.7 FL (ref 79.4–94.8)
MONOCYTES # BLD: 1 K/UL (ref 0.2–0.8)
MONOCYTES NFR BLD: 8.7 %
NEUTROPHILS # BLD: 8.9 K/UL (ref 1.4–6.5)
NEUTS SEG NFR BLD: 76.8 %
PERFORMED ON: ABNORMAL
PERFORMED ON: ABNORMAL
PERFORMED ON: NORMAL
PLATELET # BLD AUTO: 119 K/UL (ref 130–400)
POTASSIUM SERPL-SCNC: 3.6 MEQ/L (ref 3.4–4.9)
PROT SERPL-MCNC: 6.7 G/DL (ref 6.3–8)
RBC # BLD AUTO: 3.75 M/UL (ref 4.2–5.4)
SODIUM SERPL-SCNC: 137 MEQ/L (ref 135–144)
WBC # BLD AUTO: 11.6 K/UL (ref 4.8–10.8)

## 2024-04-05 PROCEDURE — 83735 ASSAY OF MAGNESIUM: CPT

## 2024-04-05 PROCEDURE — 80053 COMPREHEN METABOLIC PANEL: CPT

## 2024-04-05 PROCEDURE — 99232 SBSQ HOSP IP/OBS MODERATE 35: CPT | Performed by: INTERNAL MEDICINE

## 2024-04-05 PROCEDURE — 2580000003 HC RX 258: Performed by: COLON & RECTAL SURGERY

## 2024-04-05 PROCEDURE — 85025 COMPLETE CBC W/AUTO DIFF WBC: CPT

## 2024-04-05 PROCEDURE — 94640 AIRWAY INHALATION TREATMENT: CPT

## 2024-04-05 PROCEDURE — 2500000003 HC RX 250 WO HCPCS: Performed by: COLON & RECTAL SURGERY

## 2024-04-05 PROCEDURE — 6360000002 HC RX W HCPCS: Performed by: COLON & RECTAL SURGERY

## 2024-04-05 PROCEDURE — 93930 UPPER EXTREMITY STUDY: CPT

## 2024-04-05 PROCEDURE — 99291 CRITICAL CARE FIRST HOUR: CPT | Performed by: INTERNAL MEDICINE

## 2024-04-05 PROCEDURE — 94761 N-INVAS EAR/PLS OXIMETRY MLT: CPT

## 2024-04-05 PROCEDURE — 6370000000 HC RX 637 (ALT 250 FOR IP): Performed by: COLON & RECTAL SURGERY

## 2024-04-05 PROCEDURE — 2000000000 HC ICU R&B

## 2024-04-05 RX ADMIN — ATORVASTATIN CALCIUM 40 MG: 40 TABLET, FILM COATED ORAL at 21:37

## 2024-04-05 RX ADMIN — HYDROXYZINE HYDROCHLORIDE 25 MG: 25 TABLET ORAL at 07:40

## 2024-04-05 RX ADMIN — SEVELAMER CARBONATE 800 MG: 800 TABLET, FILM COATED ORAL at 07:40

## 2024-04-05 RX ADMIN — ALBUTEROL SULFATE 2.5 MG: 2.5 SOLUTION RESPIRATORY (INHALATION) at 14:59

## 2024-04-05 RX ADMIN — INSULIN LISPRO 4 UNITS: 100 INJECTION, SOLUTION INTRAVENOUS; SUBCUTANEOUS at 07:40

## 2024-04-05 RX ADMIN — SEVELAMER CARBONATE 800 MG: 800 TABLET, FILM COATED ORAL at 21:37

## 2024-04-05 RX ADMIN — MIDODRINE HYDROCHLORIDE 20 MG: 10 TABLET ORAL at 21:37

## 2024-04-05 RX ADMIN — MUPIROCIN: 20 OINTMENT TOPICAL at 21:36

## 2024-04-05 RX ADMIN — ASPIRIN 81 MG: 81 TABLET, COATED ORAL at 07:40

## 2024-04-05 RX ADMIN — SERTRALINE HYDROCHLORIDE 50 MG: 50 TABLET ORAL at 21:37

## 2024-04-05 RX ADMIN — CEFAZOLIN 2000 MG: 2 INJECTION, POWDER, FOR SOLUTION INTRAMUSCULAR; INTRAVENOUS at 03:03

## 2024-04-05 RX ADMIN — ALBUTEROL SULFATE 2.5 MG: 2.5 SOLUTION RESPIRATORY (INHALATION) at 03:43

## 2024-04-05 RX ADMIN — ONDANSETRON 4 MG: 2 INJECTION INTRAMUSCULAR; INTRAVENOUS at 22:11

## 2024-04-05 RX ADMIN — MICONAZOLE NITRATE: 2 POWDER TOPICAL at 07:41

## 2024-04-05 RX ADMIN — SODIUM CHLORIDE, PRESERVATIVE FREE 10 ML: 5 INJECTION INTRAVENOUS at 07:42

## 2024-04-05 RX ADMIN — Medication 13 MCG/MIN: at 03:00

## 2024-04-05 RX ADMIN — INSULIN GLARGINE 15 UNITS: 100 INJECTION, SOLUTION SUBCUTANEOUS at 07:40

## 2024-04-05 RX ADMIN — CEFAZOLIN 2000 MG: 2 INJECTION, POWDER, FOR SOLUTION INTRAMUSCULAR; INTRAVENOUS at 15:05

## 2024-04-05 RX ADMIN — SEVELAMER CARBONATE 800 MG: 800 TABLET, FILM COATED ORAL at 15:05

## 2024-04-05 RX ADMIN — MUPIROCIN: 20 OINTMENT TOPICAL at 07:41

## 2024-04-05 RX ADMIN — INSULIN GLARGINE 15 UNITS: 100 INJECTION, SOLUTION SUBCUTANEOUS at 21:37

## 2024-04-05 RX ADMIN — APIXABAN 2.5 MG: 2.5 TABLET, FILM COATED ORAL at 21:37

## 2024-04-05 RX ADMIN — SODIUM CHLORIDE, PRESERVATIVE FREE 10 ML: 5 INJECTION INTRAVENOUS at 21:38

## 2024-04-05 RX ADMIN — PANTOPRAZOLE SODIUM 20 MG: 20 TABLET, DELAYED RELEASE ORAL at 07:40

## 2024-04-05 RX ADMIN — HYDROXYZINE HYDROCHLORIDE 25 MG: 25 TABLET ORAL at 21:37

## 2024-04-05 RX ADMIN — ARIPIPRAZOLE 5 MG: 5 TABLET ORAL at 07:40

## 2024-04-05 RX ADMIN — MIDODRINE HYDROCHLORIDE 20 MG: 10 TABLET ORAL at 07:40

## 2024-04-05 RX ADMIN — MICONAZOLE NITRATE: 2 POWDER TOPICAL at 21:36

## 2024-04-05 RX ADMIN — MIDODRINE HYDROCHLORIDE 20 MG: 10 TABLET ORAL at 15:05

## 2024-04-05 ASSESSMENT — PAIN SCALES - GENERAL
PAINLEVEL_OUTOF10: 0

## 2024-04-05 NOTE — PROGRESS NOTES
Physical Therapy Missed Treatment   Facility/Department: [facility] MED SURG IC05/IC05-01    NAME: Michelle eL    : 1960 (64 y.o.)  MRN: [id number]    Account: [id number]  Gender: female      Pt unable to be seen due to on dialysis currently and awaiting duplex results. Nursing staff notified.      Will follow and attempt PT evaluation again at earliest availability.       Irais Hernandez, PT, 24 at 2:11 PM

## 2024-04-05 NOTE — PROGRESS NOTES
Progress Note  Date:2024       [facility]  Patient Name:Michelle Le     YOB: 1960     Age:64 y.o.        Subjective    Subjective:  Symptoms:  She reports weakness.  No shortness of breath, malaise, cough, chest pain, headache, chest pressure, anorexia, diarrhea or anxiety.    Diet:  No nausea or vomiting.       Review of Systems   Respiratory:  Negative for cough and shortness of breath.    Cardiovascular:  Negative for chest pain.   Gastrointestinal:  Negative for anorexia, diarrhea, nausea and vomiting.   Neurological:  Positive for weakness.     Objective         Vitals Last 24 Hours:  TEMPERATURE:  Temp  Av.5 °F (36.9 °C)  Min: 98.2 °F (36.8 °C)  Max: 98.6 °F (37 °C)  RESPIRATIONS RANGE: Resp  Avg: 15.8  Min: 11  Max: 21  PULSE OXIMETRY RANGE: SpO2  Av.6 %  Min: 86 %  Max: 100 %  PULSE RANGE: Pulse  Av.4  Min: 71  Max: 102  BLOOD PRESSURE RANGE: Systolic (24hrs), Av , Min:80 , Max:142   ; Diastolic (24hrs), Av, Min:23, Max:112    I/O (24Hr):    Intake/Output Summary (Last 24 hours) at 2024 1310  Last data filed at 2024 0800  Gross per 24 hour   Intake 1025.29 ml   Output 3800 ml   Net -2774.71 ml       Objective:  General Appearance:  In no acute distress.    Vital signs: (most recent): Blood pressure (!) 142/61, pulse 72, temperature 98.2 °F (36.8 °C), temperature source Oral, resp. rate 15, height 1.7 m (5' 6.93\"), weight 101.2 kg (223 lb), SpO2 98 %, not currently breastfeeding.    HEENT: Normal HEENT exam.    Lungs:  There are decreased breath sounds.    Heart: S1 normal and S2 normal.  No gallop.   Abdomen: Abdomen is soft.  Bowel sounds are normal.   There is no epigastric area or suprapubic area tenderness.     Extremities: There is no deformity.    Neurological: Patient is alert and oriented to person, place and time.    Pupils:  Pupils are equal, round, and reactive to light.    Skin:  Warm.      Labs/Imaging/Diagnostics   min  4/5: Patient on less pressors than before.  Will have 3 L of fluid removed.  Continue current care.  No overnight events.  Had a bowel movement yesterday.  Feeling better. TCT 35 min  Electronically signed by Adali Cintron MD on 3/30/24 at 11:25 AM EDT

## 2024-04-05 NOTE — PLAN OF CARE
Nutrition Problem #1: Altered nutrition-related lab values  Intervention: Food and/or Nutrient Delivery: Continue Current Diet  Nutritional

## 2024-04-05 NOTE — PROGRESS NOTES
activities of daily living     Infection of venous access port 2022    Mediastinal lymphadenopathy     Sangeeta Cummings    Metabolic syndrome     Moderate persistent asthma without complication 2020    Need for extended care facility 2021    Neurogenic urinary incontinence 2013    Neuropathy in diabetes ([facility])     Nonrheumatic mitral valve regurgitation 2021    Nonrheumatic tricuspid valve regurgitation 2021    Obesity (BMI 30-39.9)     Recurrent UTI     S/P colonoscopy     [facility], focal active colitis    Schizophrenia, paranoid, chronic ([facility])     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 Bello    CORONARY ANGIOPLASTY WITH STENT PLACEMENT      x1 Dr. Walker, Dr Borja 2018    CORONARY ANGIOPLASTY WITH STENT PLACEMENT  08/10/2021    [facility]    DIAGNOSTIC CARDIAC CATH LAB PROCEDURE  10/02/2019    DIALYSIS CATHETER INSERTION Left 2022    Tunneled Symetrex 15.5F x 23cm hemodialysis catheter inserted by Dr. White    DIALYSIS CATHETER INSERTION Left 2022    15.5Jh71de replamcent tunneld HD cath by Dr. White    HYSTERECTOMY, TOTAL ABDOMINAL (CERVIX REMOVED)      one ovary intact, Dr Fabian, menorrhagia    IR THROMBECTOMY PERCUT AVF  2022    IR THROMBECTOMY PERCUT AVF 2022 [facility] SPECIAL PROCEDURE    IR TUNNELED CATHETER PLACEMENT GREATER THAN 5 YEARS   SENSOR) MISC, Replace every 14 days E11.65  Continuous Blood Gluc  (FREESTYLE KAIT 2 READER) CORETTA, Give 1   Blood Glucose Monitoring Suppl (ONETOUCH VERIO REFLECT) w/Device KIT, Give 1 meter dx E11.65  Blood Glucose Monitoring Suppl (ONETOUCH VERIO) w/Device KIT, AS DIRECTED  Alcohol Swabs (EASY TOUCH ALCOHOL PREP MEDIUM) 70 % PADS, USE AS DIRECTED THREE TIMES A DAY  FreeStyle Lancets MISC, Test 4x daily  melatonin 3 MG TABS tablet, Take 10 mg by mouth nightly as needed Indications: Trouble Sleeping  albuterol (PROVENTIL) 2.5 MG/0.5ML NEBU nebulizer solution, Take 0.5 mLs by nebulization every 4 hours as needed for Wheezing or Shortness of Breath  LACTOBACILLUS PO, Take 2 capsules by mouth in the morning and 2 capsules in the evening. Indications: Nutritional Support. (Patient not taking: Reported on 3/29/2024)  insulin lispro (HUMALOG) 100 UNIT/ML injection vial, Inject 12 Units into the skin 3 times daily (before meals) Indications: Type 2 Diabetes (Patient not taking: Reported on 3/29/2024)  insulin lispro (HUMALOG) 100 UNIT/ML injection vial, Inject 0-4 Units into the skin nightly Indications: Type 2 Diabetes Inject as per sliding scale: If 201-250=1u; 251-300=2u; 301-350=3u; 351-400=4u. Call MD/NP if >400.  b complex-C-folic acid (NEPHROCAPS) 1 MG capsule, Take 1 capsule by mouth daily Indications: Dialysis Dependent Chronic Kidney Failure  pantoprazole (PROTONIX) 20 MG tablet, Take 1 tablet by mouth daily Indications: Gastroesophageal Reflux Disease  nitroGLYCERIN (NITROSTAT) 0.4 MG SL tablet, up to max of 3 total doses. If no relief after 1 dose, call 911. (Patient taking differently: Place 1 tablet under the tongue every 5 minutes as needed for Chest pain up to max of 3 total doses. If no relief after 1 dose, call 911.)  hydrOXYzine HCl (ATARAX) 25 MG tablet, Take 1 tablet by mouth 2 times daily Indications: Allergy  SURE COMFORT PEN NEEDLES 30G X 8 MM MISC, USE AS DIRECTED FIVE TIMES A

## 2024-04-05 NOTE — PROGRESS NOTES
PHARMACY NOTE:   ICU Rounds Attended (10-15 minutes in patient room):    Pt diagnosis: fluid overload    Follow up/Changes:    -home meds reviewed/ reordered  -plan HD today and tomorrow  -No changes made       NOTES:    Antimicrobial Therapy: Day 6/7: ancef. No Cultures. ID on consult  Pressors: norepinephrine @ 11  Insulin Therapy (goal: 140-180): high SSI. 4 units given past 24 hours. Lantus 15 BID  Stress Ulcer Prophylaxis: Pantoprazole  DVT Prophylaxis/Anticoagulant Therapy: eliquis held for HD cath  Core measures assessed/met.       Leti Freeman RPH PharmD

## 2024-04-05 NOTE — PROGRESS NOTES
1915: Report received at bedside from Della HUDDLESTON. Skin assessed with Della HUDDLESTON at this time. Patient remains on levophed at this time.     2000: Patient assessed at this time.     2100: Patient bathed, linens changed at this time. Dressing applied to left ear due to skin breakdown.    0000: Patient reassessed. No acute changes noted at this time.     0400: Patient reassessed. No acute changes noted at this time.     0430: Patient placed back on 2L NC due to low O2.     0710: Report given to Della HUDDLESTON at beside. Patient remains on levophed at this time.

## 2024-04-05 NOTE — PLAN OF CARE
Problem: Discharge Planning  Goal: Discharge to home or other facility with appropriate resources  Outcome: Progressing     Problem: Safety - Adult  Goal: Free from fall injury  Outcome: Progressing     Problem: Skin/Tissue Integrity  Goal: Absence of new skin breakdown  Description: 1.  Monitor for areas of redness and/or skin breakdown  2.  Assess vascular access sites hourly  3.  Every 4-6 hours minimum:  Change oxygen saturation probe site  4.  Every 4-6 hours:  If on nasal continuous positive airway pressure, respiratory therapy assess nares and determine need for appliance change or resting period.  Outcome: Progressing     Problem: Chronic Conditions and Co-morbidities  Goal: Patient's chronic conditions and co-morbidity symptoms are monitored and maintained or improved  Outcome: Progressing     Problem: Pain  Goal: Verbalizes/displays adequate comfort level or baseline comfort level  Outcome: Progressing     Problem: Respiratory - Adult  Goal: Achieves optimal ventilation and oxygenation  Outcome: Progressing     Problem: Cardiovascular - Adult  Goal: Maintains optimal cardiac output and hemodynamic stability  Outcome: Progressing  Goal: Absence of cardiac dysrhythmias or at baseline  Outcome: Progressing     Problem: Skin/Tissue Integrity - Adult  Goal: Skin integrity remains intact  Outcome: Progressing  Flowsheets (Taken 4/4/2024 2000)  Skin Integrity Remains Intact: Monitor for areas of redness and/or skin breakdown  Goal: Incisions, wounds, or drain sites healing without S/S of infection  Outcome: Progressing     Problem: Musculoskeletal - Adult  Goal: Return mobility to safest level of function  Outcome: Progressing     Problem: Gastrointestinal - Adult  Goal: Minimal or absence of nausea and vomiting  Outcome: Progressing  Goal: Maintains or returns to baseline bowel function  Outcome: Progressing  Goal: Maintains adequate nutritional intake  Outcome: Progressing     Problem: Genitourinary -  Adult  Goal: Absence of urinary retention  Outcome: Progressing     Problem: Infection - Adult  Goal: Absence of infection at discharge  Outcome: Progressing     Problem: Metabolic/Fluid and Electrolytes - Adult  Goal: Electrolytes maintained within normal limits  Outcome: Progressing  Goal: Hemodynamic stability and optimal renal function maintained  Outcome: Progressing  Goal: Glucose maintained within prescribed range  Outcome: Progressing     Problem: Hematologic - Adult  Goal: Maintains hematologic stability  Outcome: Progressing

## 2024-04-05 NOTE — PROGRESS NOTES
Assessments per flowsheet.  Medications per MAR.    Pt has had uneventful shift.    BP cuff moved to right lower leg per Dr. GOMES.  Pressures in lower leg are running higher than right arm.  Attempting to wean off Levophed.    Dialysis completed with the removal of 3.1 Liters.    US MILLICENT upper extremity arteries complete.  Pt has old fistula in upper right arm.  Pulses are weaker in that area.      Soap suds enema given.  Pt tolerated well and had a large soft stool.  Pt stats she \"feels better\".    Pt has voiced no complaints this shift.  Will continue with current plan of care.

## 2024-04-05 NOTE — PROGRESS NOTES
Michelle Le  1960  female  Medical Record Number: [id number]    Patient informed that I am an Infectious Disease physician and permission obtained from the patient to speak in front of any visitors prior to any discussion for HIPPA purposes.     HPI:  Patient admitted with shortness of breath.  She is a dialysis patient.  She has been picking at her skin and has multiple self-inflicted wounds.  Noted to be hypoxic with hypotension on admission  Started on cefazolin empirically.  Cultures from her hand have not grown anything.  She tells me that she picks at her skin when she is nervous.  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 Amoxicillin. She also has a history of dialysis catheter related Enterococcus bacteremia.     All cultures have been negative to date.  Procalcitonin level was not very impressive.  Remains on Levophed.    Physical Exam:  NAD, resting but arousable  Skin: no new rashes, excoriations on the arms are looking much better HEENT: EOMI, MMM, Neck is supple  Heart: S1 S2  Lungs: bilaterally equal expansion  Abdomen: soft, ND, NTTP, +BS  Extrem: No pain in calf region  neuro exam: CN II-XII intact  Remains on pressors    Labs:   I have reviewed all lab results by electronic record, including most recent CBC, metabolic panel, and pertinent abnormalities were addressed from an infectious disease perspective. WBC trends are being monitored.    Lab Results   Component Value Date/Time     04/04/2024 04:27 AM    K 3.1 04/04/2024 04:27 AM    K 3.6 04/03/2024 07:48 AM    CL 92 04/04/2024 04:27 AM    CO2 29 04/04/2024 04:27 AM    BUN 29 04/04/2024 04:27 AM    CREATININE 5.79 04/04/2024 04:27 AM    GLUCOSE 137 04/04/2024 04:27 AM    GLUCOSE 90 10/24/2022 10:43 AM    CALCIUM 8.7 04/04/2024 04:27

## 2024-04-05 NOTE — INTERDISCIPLINARY ROUNDS
Spiritual Care Services     Summary of Visit:   participated in ICU rounds. No immediate spiritual needs noted.    Encounter Summary  Encounter Overview/Reason : Interdisciplinary rounds  Service Provided For:: Patient  Referral/Consult From:: Rounding  Support System: Children  Last Encounter : 04/03/24  Complexity of Encounter: Low  Begin Time: 0800  End Time : 0815  Total Time Calculated: 15 min        Spiritual/Emotional needs  Type: Spiritual Support                      Spiritual Assessment/Intervention/Outcomes:    Assessment: Concerns with suffering    Intervention: Sustaining Presence/Ministry of presence, Active listening    Outcome: Receptive      Care Plan:    Plan and Referrals  Plan/Referrals: Continue Support (comment), Continue to visit, (comment)     to provide additional spiritual support as needed or requested      Spiritual Care Services   Electronically signed by Chaplain Pearl on 4/5/2024 at 9:55 AM.    To reach a  for emotional and spiritual support, place an EPIC consult request.   If a  is needed immediately, dial “0” and ask to page the on-call .

## 2024-04-05 NOTE — PROGRESS NOTES
Comprehensive Nutrition Assessment    Type and Reason for Visit:  RD Nutrition Re-Screen/LOS, Initial    Nutrition Recommendations/Plan:   Continue current plan of care  Anticipate weight loss as fluid status improves     Malnutrition Assessment:  Malnutrition Status:  No malnutrition (04/05/24 1506)      Nutrition Assessment:    Nutritional status appears adequate at this time, current diet appropriate and well tolerated    Nutrition Related Findings:    \"PMHx of schizophrenia,  CAD ,hyperlipidemia, GERD, obesity, neuropathy, COPD, type II DM, ESRD on dialysis (Tuesday, Thursday, Saturday), CHF  presents to ED via EMS from [facility] for evaluation of shortness of breath.  Patient reports that she has become increasingly short of breath over the last week.  Patient reports that the NH is concerned that she may be volume overloaded.  Patient reports that she has been compliant with her dialysis schedule, however reports that secondary to hypotension they are unable to remove large volumes of fluid... per nephrologyHer dry weight is 97 kg and has been leaving around 105 kg\" anuric., + BM's , no GI & or nutrition related complaints, labs noted meds reviewed, currently on levophed, mild-moderate generalized edema present Wound Type: Deep Tissue Injury, Surgical Incision (coccxy)       Current Nutrition Intake & Therapies:    Average Meal Intake: %  Average Supplements Intake: None Ordered  ADULT DIET; Regular; 4 carb choices (60 gm/meal)    Anthropometric Measures:  Height: 170.2 cm (5' 7\")  Ideal Body Weight (IBW): 135 lbs (61 kg)    Admission Body Weight: 107 kg (236 lb)  Current Body Weight: 101.2 kg (223 lb) (* edema present), 165.2 % IBW. Weight Source: Bed Scale  Current BMI (kg/m2): 34.9  Usual Body Weight: 90.3 kg (199 lb) (( 4/2023), 195# -2022 ** 213# dry wt per nephro)  % Weight Change (Calculated): 12.1                         Estimated Daily Nutrient Needs:  Energy Requirements Based On: Kcal/kg  Weight  Used for Energy Requirements: Current  Energy (kcal/day): 3969-1675 kcals @ 15-18 kcal/kg  Weight Used for Protein Requirements: Ideal  Protein (g/day): 73-85 g protein @ 1.2-1.4 g/kg IBW     Fluid (ml/day): ~1200 ml per day or as per nephrology    Nutrition Diagnosis:   Altered nutrition-related lab values related to endocrine dysfuntion, renal dysfunction as evidenced by lab values    Nutrition Interventions:   Food and/or Nutrient Delivery: Continue Current Diet  Nutrition Education/Counseling: Education not indicated (NH resident)  Coordination of Nutrition Care: Continue to monitor while inpatient       Goals:     Goals: PO intake 75% or greater, by next RD assessment       Nutrition Monitoring and Evaluation:   Behavioral-Environmental Outcomes: None Identified  Food/Nutrient Intake Outcomes: Food and Nutrient Intake  Physical Signs/Symptoms Outcomes: Biochemical Data, Meal Time Behavior, Fluid Status or Edema, Weight, Skin    Discharge Planning:    Continue current diet     NATHAN FUNG, RD, LD

## 2024-04-05 NOTE — PROGRESS NOTES
edema, swelling right lower extremity  Neuro:  Grossly normal  Skin: No rashes or nodules noted.  Lymph node:  no cervical nodes  Urology: No Reynolds   Psychiatric: appropriate    Medications:  Scheduled Meds:   insulin glargine  15 Units SubCUTAneous BID    midodrine  20 mg Oral TID WC    insulin lispro  0-16 Units SubCUTAneous TID WC    insulin lispro  0-4 Units SubCUTAneous Nightly    mupirocin   Topical BID    ceFAZolin  2,000 mg IntraVENous Q12H    hydrOXYzine HCl  25 mg Oral BID    sodium chloride flush  5-40 mL IntraVENous 2 times per day    ARIPiprazole  5 mg Oral Daily    atorvastatin  40 mg Oral Nightly    pantoprazole  20 mg Oral Daily    sevelamer  800 mg Oral TID    [Held by provider] apixaban  2.5 mg Oral BID    aspirin EC  81 mg Oral Daily    sertraline  50 mg Oral Nightly    miconazole   Topical BID    albuterol  2.5 mg Nebulization BID RT       PRN Meds:  HYDROcodone 5 mg - acetaminophen **OR** HYDROcodone 5 mg - acetaminophen, perflutren lipid microspheres, sennosides-docusate sodium, sodium chloride flush, sodium chloride, ondansetron **OR** ondansetron, polyethylene glycol, acetaminophen **OR** acetaminophen, albumin human 25%, glucose, dextrose bolus **OR** dextrose bolus, glucagon (rDNA), dextrose, albuterol    Results: reviewed by me   CBC:   Recent Labs     04/03/24  0748 04/04/24 0427 04/05/24  0554   WBC 9.8 13.0* 11.6*   HGB 12.9 12.5 12.7   HCT 38.7 36.9* 37.0   .5* 99.2* 98.7*   * 117* 119*       BMP:   Recent Labs     04/03/24  0748 04/04/24 0427 04/05/24  0554    136 137   K 3.6 3.1* 3.6   CL 92* 92* 94*   CO2 29 29 26   BUN 24* 29* 21   CREATININE 4.60* 5.79* 4.56*       LIVER PROFILE:   Recent Labs     04/03/24  0748 04/04/24 0427 04/05/24  0554   AST 14 14 14   ALT <5 <5 <5   BILITOT 1.2* 1.1* 1.2*   ALKPHOS 88 84 88       PT/INR: No results for input(s): \"PROTIME\", \"INR\" in the last 72 hours.  APTT: No results for input(s): \"APTT\" in the last 72 hours.  UA:No  results for input(s): \"NITRITE\", \"COLORU\", \"PHUR\", \"LABCAST\", \"WBCUA\", \"RBCUA\", \"MUCUS\", \"TRICHOMONAS\", \"YEAST\", \"BACTERIA\", \"CLARITYU\", \"SPECGRAV\", \"LEUKOCYTESUR\", \"UROBILINOGEN\", \"BILIRUBINUR\", \"BLOODU\", \"GLUCOSEU\", \"AMORPHOUS\" in the last 72 hours.    Invalid input(s): \"KETONESU\"    Cultures:  Negative so far  Films:  CXR films reviewed by me and it showed bilateral pleural effusion      Assessment:  This is a critically ill patient at risk of deterioration / death , needing close ICU monitoring and intervention due to below noted problems   Shock etiology is not clear,?  Septic,    End-stage renal disease on dialysis  Volume overload  Swelling right lower extremity, no DVT  A-fib on low-dose Eliquis currently on hold    Recommendation  Continue Levophed target mean arterial pressure 65-70  Continue midodrine  Bilateral upper extremity arterial ultrasound rule out arterial stenosis  Monitor blood pressure and lower extremities  Continue Eliquis   Target blood sugar 140-180              Due to the immediate potential for life-threatening deterioration due to shock I spent 40  minutes providing critical care.  This time is excluding time spent performing procedures.          Electronically signed by Say Marsh MD,  [facility] ,on 4/5/2024 at 8:53 AM

## 2024-04-05 NOTE — CARE COORDINATION
ICU team rounds done and pt still on IV Levo with plan to wean off. Pt dc plan is KOV when medically cleared.

## 2024-04-05 NOTE — PROGRESS NOTES
Renal Progress Note    Assessment and Plan:   64-year-old lady with end-stage renal disease on hemodialysis Tuesday Thursday Saturday.  Chronic hypotension maintained on large dose midodrine.  Admitted with SOB.  Sig above dry weight.  Doesn't make urine.  EF is normal.  Transferred to ICU with hypotension. Her DW is at 97 kg as outpt.  Had been leaving above that     Plan/  1-hd today and tomorrow as sig fluid overloaded.  Then can go back to [facility]  2. Try to get off levo after that  3. D/c lokelma.  3k bath  4. Ok for eliquis       Patient Active Problem List:     Coronary artery disease involving native coronary artery of native heart with angina pectoris (HCC)     Schizophrenia, paranoid, chronic     Metabolic syndrome     Vitamin B 12 deficiency     Cerebral microvascular disease     Mixed hyperlipidemia     Other hammer toe (acquired)     Vitamin D insufficiency     Incontinence of urine     Diabetic nephropathy with proteinuria (HCC)     Essential (primary) hypertension     History of type C viral hepatitis     Urinary incontinence due to cognitive impairment     History of seizures     Stented coronary artery-plan is to stay on Plavix indefinately per Dr Walker     Diabetes mellitus (HCC)     Controlled type 2 diabetes mellitus with diabetic neuropathy, with long-term current use of insulin (HCC)     Hemiparesis, left (HCC)     Angina, class II (HCC)     Pain, unspecified     Tardive dyskinesia     Shortness of breath     Uncontrolled type 2 diabetes mellitus with hyperglycemia (HCC)     Chronic diastolic congestive heart failure (HCC)     [place] (obstructive sleep apnea)     Pulmonary hypertension, unspecified (HCC)     Class 2 severe obesity with serious comorbidity and body mass index (BMI) of 36.0 to 36.9 in adult (HCC)     Edema     Closed supracondylar fracture of right humerus     Other chronic pain     Palliative care patient     Recurrent falls     Renal failure     Difficulty in walking     ESRD (end  (HCC)     Generalized weakness     Shock (HCC)     Fluid overload     Encounter for hospice care discussion     Advanced care planning/counseling discussion     Goals of care, counseling/discussion     Palliative care encounter      Subjective:   Admit Date: 3/29/2024    Interval History: gomez placed.  On high dose midodrine and levo.  Bp seem better when taking from leg. No cp.  No sob      Medications:   Scheduled Meds:   insulin glargine  15 Units SubCUTAneous BID    midodrine  20 mg Oral TID WC    insulin lispro  0-16 Units SubCUTAneous TID WC    insulin lispro  0-4 Units SubCUTAneous Nightly    mupirocin   Topical BID    ceFAZolin  2,000 mg IntraVENous Q12H    hydrOXYzine HCl  25 mg Oral BID    sodium chloride flush  5-40 mL IntraVENous 2 times per day    ARIPiprazole  5 mg Oral Daily    atorvastatin  40 mg Oral Nightly    pantoprazole  20 mg Oral Daily    sevelamer  800 mg Oral TID    [Held by provider] apixaban  2.5 mg Oral BID    aspirin EC  81 mg Oral Daily    sertraline  50 mg Oral Nightly    miconazole   Topical BID    albuterol  2.5 mg Nebulization BID RT     Continuous Infusions:   norepinephrine 11 mcg/min (04/05/24 0559)    sodium chloride Stopped (03/30/24 1524)    dextrose         CBC:   Recent Labs     04/04/24  0427 04/05/24  0554   WBC 13.0* 11.6*   HGB 12.5 12.7   * 119*       CMP:    Recent Labs     04/03/24  0748 04/04/24  0427 04/05/24  0554    136 137   K 3.6 3.1* 3.6   CL 92* 92* 94*   CO2 29 29 26   BUN 24* 29* 21   CREATININE 4.60* 5.79* 4.56*   GLUCOSE 408* 137* 223*   CALCIUM 8.3* 8.7 8.4*   LABGLOM 9.9* 7.5* 10.0*       Troponin: No results for input(s): \"TROPONINI\" in the last 72 hours.  BNP: No results for input(s): \"BNP\" in the last 72 hours.  INR:   No results for input(s): \"INR\" in the last 72 hours.    Lipids: No results for input(s): \"CHOL\", \"LDLDIRECT\", \"TRIG\", \"HDL\", \"AMYLASE\", \"LIPASE\" in the last 72 hours.  Liver:   Recent Labs     04/05/24  0554   AST 14

## 2024-04-06 PROBLEM — D80.1 HYPOGAMMAGLOBULINEMIA (HCC): Status: ACTIVE | Noted: 2024-04-06

## 2024-04-06 PROBLEM — L08.9 SEPSIS DUE TO SKIN INFECTION (HCC): Status: ACTIVE | Noted: 2022-07-09

## 2024-04-06 LAB
ALBUMIN SERPL-MCNC: 3.8 G/DL (ref 3.5–4.6)
ALP SERPL-CCNC: 83 U/L (ref 40–130)
ALT SERPL-CCNC: <5 U/L (ref 0–33)
ANION GAP SERPL CALCULATED.3IONS-SCNC: 17 MEQ/L (ref 9–15)
AST SERPL-CCNC: 13 U/L (ref 0–35)
BASOPHILS # BLD: 0 K/UL (ref 0–0.2)
BASOPHILS NFR BLD: 0.4 %
BILIRUB SERPL-MCNC: 1 MG/DL (ref 0.2–0.7)
BUN SERPL-MCNC: 17 MG/DL (ref 8–23)
CALCIUM SERPL-MCNC: 8.4 MG/DL (ref 8.5–9.9)
CHLORIDE SERPL-SCNC: 91 MEQ/L (ref 95–107)
CO2 SERPL-SCNC: 28 MEQ/L (ref 20–31)
CREAT SERPL-MCNC: 3.68 MG/DL (ref 0.5–0.9)
EOSINOPHIL # BLD: 0.5 K/UL (ref 0–0.7)
EOSINOPHIL NFR BLD: 4.4 %
ERYTHROCYTE [DISTWIDTH] IN BLOOD BY AUTOMATED COUNT: 16.7 % (ref 11.5–14.5)
GLOBULIN SER CALC-MCNC: 2.9 G/DL (ref 2.3–3.5)
GLUCOSE BLD-MCNC: 154 MG/DL (ref 70–99)
GLUCOSE BLD-MCNC: 156 MG/DL (ref 70–99)
GLUCOSE BLD-MCNC: 184 MG/DL (ref 70–99)
GLUCOSE BLD-MCNC: 185 MG/DL (ref 70–99)
GLUCOSE SERPL-MCNC: 207 MG/DL (ref 70–99)
HCT VFR BLD AUTO: 35.8 % (ref 37–47)
HGB BLD-MCNC: 12 G/DL (ref 12–16)
LYMPHOCYTES # BLD: 1.1 K/UL (ref 1–4.8)
LYMPHOCYTES NFR BLD: 10.6 %
MAGNESIUM SERPL-MCNC: 1.9 MG/DL (ref 1.7–2.4)
MCH RBC QN AUTO: 33.7 PG (ref 27–31.3)
MCHC RBC AUTO-ENTMCNC: 33.5 % (ref 33–37)
MCV RBC AUTO: 100.6 FL (ref 79.4–94.8)
MONOCYTES # BLD: 0.9 K/UL (ref 0.2–0.8)
MONOCYTES NFR BLD: 9 %
NEUTROPHILS # BLD: 7.6 K/UL (ref 1.4–6.5)
NEUTS SEG NFR BLD: 75.1 %
PERFORMED ON: ABNORMAL
PLATELET # BLD AUTO: 106 K/UL (ref 130–400)
POTASSIUM SERPL-SCNC: 3.7 MEQ/L (ref 3.4–4.9)
PROT SERPL-MCNC: 6.7 G/DL (ref 6.3–8)
RBC # BLD AUTO: 3.56 M/UL (ref 4.2–5.4)
SODIUM SERPL-SCNC: 136 MEQ/L (ref 135–144)
WBC # BLD AUTO: 10.1 K/UL (ref 4.8–10.8)

## 2024-04-06 PROCEDURE — 85025 COMPLETE CBC W/AUTO DIFF WBC: CPT

## 2024-04-06 PROCEDURE — 2580000003 HC RX 258: Performed by: INTERNAL MEDICINE

## 2024-04-06 PROCEDURE — 99291 CRITICAL CARE FIRST HOUR: CPT | Performed by: INTERNAL MEDICINE

## 2024-04-06 PROCEDURE — 2000000000 HC ICU R&B

## 2024-04-06 PROCEDURE — 6370000000 HC RX 637 (ALT 250 FOR IP): Performed by: COLON & RECTAL SURGERY

## 2024-04-06 PROCEDURE — 6360000002 HC RX W HCPCS: Performed by: INTERNAL MEDICINE

## 2024-04-06 PROCEDURE — 5A1D70Z PERFORMANCE OF URINARY FILTRATION, INTERMITTENT, LESS THAN 6 HOURS PER DAY: ICD-10-PCS | Performed by: INTERNAL MEDICINE

## 2024-04-06 PROCEDURE — 99233 SBSQ HOSP IP/OBS HIGH 50: CPT | Performed by: INTERNAL MEDICINE

## 2024-04-06 PROCEDURE — 94761 N-INVAS EAR/PLS OXIMETRY MLT: CPT

## 2024-04-06 PROCEDURE — 6370000000 HC RX 637 (ALT 250 FOR IP): Performed by: INTERNAL MEDICINE

## 2024-04-06 PROCEDURE — 6360000002 HC RX W HCPCS: Performed by: COLON & RECTAL SURGERY

## 2024-04-06 PROCEDURE — 94640 AIRWAY INHALATION TREATMENT: CPT

## 2024-04-06 PROCEDURE — 2700000000 HC OXYGEN THERAPY PER DAY

## 2024-04-06 PROCEDURE — 80053 COMPREHEN METABOLIC PANEL: CPT

## 2024-04-06 PROCEDURE — 2580000003 HC RX 258: Performed by: COLON & RECTAL SURGERY

## 2024-04-06 PROCEDURE — 90935 HEMODIALYSIS ONE EVALUATION: CPT

## 2024-04-06 PROCEDURE — 83735 ASSAY OF MAGNESIUM: CPT

## 2024-04-06 PROCEDURE — 36592 COLLECT BLOOD FROM PICC: CPT

## 2024-04-06 RX ORDER — AMOXICILLIN 500 MG/1
500 CAPSULE ORAL DAILY
Status: DISCONTINUED | OUTPATIENT
Start: 2024-04-06 | End: 2024-04-12 | Stop reason: HOSPADM

## 2024-04-06 RX ORDER — FLUDROCORTISONE ACETATE 0.1 MG/1
0.1 TABLET ORAL DAILY
Status: DISCONTINUED | OUTPATIENT
Start: 2024-04-06 | End: 2024-04-10

## 2024-04-06 RX ORDER — ALBUMIN (HUMAN) 12.5 G/50ML
25 SOLUTION INTRAVENOUS
Status: ACTIVE | OUTPATIENT
Start: 2024-04-06 | End: 2024-04-07

## 2024-04-06 RX ORDER — MECLIZINE HCL 12.5 MG/1
25 TABLET ORAL DAILY
Status: DISCONTINUED | OUTPATIENT
Start: 2024-04-06 | End: 2024-04-12 | Stop reason: HOSPADM

## 2024-04-06 RX ADMIN — APIXABAN 2.5 MG: 2.5 TABLET, FILM COATED ORAL at 08:22

## 2024-04-06 RX ADMIN — ONDANSETRON 4 MG: 2 INJECTION INTRAMUSCULAR; INTRAVENOUS at 04:12

## 2024-04-06 RX ADMIN — SEVELAMER CARBONATE 800 MG: 800 TABLET, FILM COATED ORAL at 14:44

## 2024-04-06 RX ADMIN — MIDODRINE HYDROCHLORIDE 20 MG: 10 TABLET ORAL at 16:09

## 2024-04-06 RX ADMIN — CEFAZOLIN 2000 MG: 2 INJECTION, POWDER, FOR SOLUTION INTRAMUSCULAR; INTRAVENOUS at 03:49

## 2024-04-06 RX ADMIN — CEFAZOLIN 2000 MG: 2 INJECTION, POWDER, FOR SOLUTION INTRAMUSCULAR; INTRAVENOUS at 14:55

## 2024-04-06 RX ADMIN — SERTRALINE HYDROCHLORIDE 50 MG: 50 TABLET ORAL at 20:21

## 2024-04-06 RX ADMIN — SODIUM CHLORIDE, PRESERVATIVE FREE 10 ML: 5 INJECTION INTRAVENOUS at 08:27

## 2024-04-06 RX ADMIN — ONDANSETRON 4 MG: 2 INJECTION INTRAMUSCULAR; INTRAVENOUS at 09:40

## 2024-04-06 RX ADMIN — SEVELAMER CARBONATE 800 MG: 800 TABLET, FILM COATED ORAL at 20:21

## 2024-04-06 RX ADMIN — MICONAZOLE NITRATE: 2 POWDER TOPICAL at 20:21

## 2024-04-06 RX ADMIN — SEVELAMER CARBONATE 800 MG: 800 TABLET, FILM COATED ORAL at 08:22

## 2024-04-06 RX ADMIN — PANTOPRAZOLE SODIUM 20 MG: 20 TABLET, DELAYED RELEASE ORAL at 08:22

## 2024-04-06 RX ADMIN — HYDROXYZINE HYDROCHLORIDE 25 MG: 25 TABLET ORAL at 20:21

## 2024-04-06 RX ADMIN — INSULIN GLARGINE 15 UNITS: 100 INJECTION, SOLUTION SUBCUTANEOUS at 08:21

## 2024-04-06 RX ADMIN — INSULIN GLARGINE 15 UNITS: 100 INJECTION, SOLUTION SUBCUTANEOUS at 20:21

## 2024-04-06 RX ADMIN — FLUDROCORTISONE ACETATE 0.1 MG: 0.1 TABLET ORAL at 08:30

## 2024-04-06 RX ADMIN — ASPIRIN 81 MG: 81 TABLET, COATED ORAL at 08:22

## 2024-04-06 RX ADMIN — APIXABAN 2.5 MG: 2.5 TABLET, FILM COATED ORAL at 20:22

## 2024-04-06 RX ADMIN — MUPIROCIN: 20 OINTMENT TOPICAL at 08:22

## 2024-04-06 RX ADMIN — SODIUM CHLORIDE, PRESERVATIVE FREE 10 ML: 5 INJECTION INTRAVENOUS at 20:22

## 2024-04-06 RX ADMIN — ALBUTEROL SULFATE 2.5 MG: 2.5 SOLUTION RESPIRATORY (INHALATION) at 03:27

## 2024-04-06 RX ADMIN — HYDROXYZINE HYDROCHLORIDE 25 MG: 25 TABLET ORAL at 08:22

## 2024-04-06 RX ADMIN — ARIPIPRAZOLE 5 MG: 5 TABLET ORAL at 08:22

## 2024-04-06 RX ADMIN — ONDANSETRON 4 MG: 2 INJECTION INTRAMUSCULAR; INTRAVENOUS at 16:07

## 2024-04-06 RX ADMIN — MIDODRINE HYDROCHLORIDE 20 MG: 10 TABLET ORAL at 08:22

## 2024-04-06 RX ADMIN — ATORVASTATIN CALCIUM 40 MG: 40 TABLET, FILM COATED ORAL at 20:21

## 2024-04-06 RX ADMIN — MUPIROCIN: 20 OINTMENT TOPICAL at 20:20

## 2024-04-06 RX ADMIN — MICONAZOLE NITRATE: 2 POWDER TOPICAL at 08:23

## 2024-04-06 RX ADMIN — MECLIZINE 25 MG: 12.5 TABLET ORAL at 10:58

## 2024-04-06 RX ADMIN — AMOXICILLIN 500 MG: 500 CAPSULE ORAL at 14:52

## 2024-04-06 RX ADMIN — MIDODRINE HYDROCHLORIDE 20 MG: 10 TABLET ORAL at 12:18

## 2024-04-06 ASSESSMENT — PAIN SCALES - GENERAL
PAINLEVEL_OUTOF10: 0

## 2024-04-06 NOTE — DIALYSIS
Patient tolerated treatment well with the use of BP supporting medications. Primary RN at bedside adjusting medications as needed. Patient ended in crit line profile A. 3200ml total net UF removed. AVF functioned without issue. Primary RN received report before leaving room.

## 2024-04-06 NOTE — PROGRESS NOTES
Infectious Diseases Inpatient Progress Note          HISTORY OF PRESENT ILLNESS:  Follow up sepsis/shock in a patient with hypogammaglobulinemia, end-stage renal disease on hemodialysis, infected upper extremity ulcers, diabetes mellitus type 2 on IV Ancef, well tolerated.  Patient is currently weaned down to Levophed at 1 su.  She became hypotensive during hemodialysis and Levophed is currently being titrated up.   Patient reported nausea and abdominal pain.  Left arm ulcer.   No confusion.  Reports good appetite.   No cough.  Positive shortness of breath.   Positive generalized weakness  No fevers or chills  Currently connected to hemodialysis  Positive extensive bruising of upper chest and neck area  No rash or itching  No mouth soreness  Patient remains to be in ICU  Current Medications:     fludrocortisone  0.1 mg Oral Daily    meclizine  25 mg Oral Daily    albumin human 25%  25 g IntraVENous Once in dialysis    insulin glargine  15 Units SubCUTAneous BID    midodrine  20 mg Oral TID WC    insulin lispro  0-16 Units SubCUTAneous TID WC    insulin lispro  0-4 Units SubCUTAneous Nightly    mupirocin   Topical BID    ceFAZolin  2,000 mg IntraVENous Q12H    hydrOXYzine HCl  25 mg Oral BID    sodium chloride flush  5-40 mL IntraVENous 2 times per day    ARIPiprazole  5 mg Oral Daily    atorvastatin  40 mg Oral Nightly    pantoprazole  20 mg Oral Daily    sevelamer  800 mg Oral TID    apixaban  2.5 mg Oral BID    aspirin EC  81 mg Oral Daily    sertraline  50 mg Oral Nightly    miconazole   Topical BID    albuterol  2.5 mg Nebulization BID RT       Allergies:  Codeine and Oxycontin [oxycodone hcl]      Review of Systems  14 system review is negative other than HPI    Physical Exam  Vitals:    04/06/24 1145 04/06/24 1200 04/06/24 1215 04/06/24 1230   BP: (!) 120/43 (!) 119/45 (!) 113/34 (!) 107/41   Pulse: 70 69 70 70   Resp: 20 17 15 14   Temp:  98.4 °F (36.9 °C)     TempSrc:  Oral     SpO2: 98% 99% 97% 99%  syndrome    Vitamin B 12 deficiency    Cerebral microvascular disease    Mixed hyperlipidemia    Other hammer toe (acquired)    Vitamin D insufficiency    Incontinence of urine    Diabetic nephropathy with proteinuria (HCC)    Essential (primary) hypertension    History of type C viral hepatitis    Urinary incontinence due to cognitive impairment    History of seizures    Stented coronary artery-plan is to stay on Plavix indefinately per Dr Walker    Diabetes mellitus ([facility])    Controlled type 2 diabetes mellitus with diabetic neuropathy, with long-term current use of insulin ([facility])    Hemiparesis, left ([facility])    Angina, class II ([facility])    Pain, unspecified    Tardive dyskinesia    Shortness of breath    Uncontrolled type 2 diabetes mellitus with hyperglycemia ([facility])    Chronic diastolic congestive heart failure ([facility])    [place] (obstructive sleep apnea)    Pulmonary hypertension, unspecified ([facility])    Class 2 severe obesity with serious comorbidity and body mass index (BMI) of 36.0 to 36.9 in adult ([facility])    Edema    Closed supracondylar fracture of right humerus    Other chronic pain    Palliative care patient    Recurrent falls    Renal failure    Difficulty in walking    ESRD (end stage renal disease) on dialysis ([facility])    Weakness    Other seizures ([facility])    Moderate persistent asthma without complication    COVID-19    Post PTCA    Falls frequently    Chest wall contusion, left, initial encounter    Headache, unspecified    Paranoid schizophrenia ([facility])    Compression fracture of spine ([facility])    Closed rib fracture    Depression    Chronic obstructive pulmonary disease ([facility])    Critical illness polyneuropathy ([facility])    Multiple closed fractures of ribs of right side    Nonrheumatic mitral valve regurgitation    Nonrheumatic tricuspid valve regurgitation    Need for extended care facility    Chronic pain of both shoulders    Hemodialysis-associated hypotension    Anginal chest pain at rest ([facility])    Chest pain    Unstable angina

## 2024-04-06 NOTE — PROGRESS NOTES
Treatment time: 180 minutes    Net UF: 2800 mL    Pre weight: 96.6 kg  Post weight: 93.8 kg  EDW: 97 kg    Access used: AVF LUE  Access function: good    Medications or blood products given: N/A    Regular outpatient schedule: [facility]    Summary of response to treatment: Patient tolerated treatment well, patient evaluated by Dr. Castellano during treatment, levo titrated by ICU RN, bleeding time <10 minutes from access sites, report given to FLAQUITO Jacinto.    Copy of dialysis treatment record placed in chart, to be scanned into EMR.

## 2024-04-06 NOTE — PROGRESS NOTES
Cardiology progress note    Patient Name: Michelle Le  Admit Date: 3/29/2024 10:23 AM  MR #: [id number]  : 1960    Attending Physician: Adali Cintron MD  Reason for consult: Hypotension    History of Presenting Illness:      Michelle Le is a 64 y.o. female on hospital day 8 with a history of CAD prior PCI to the LAD, CABG LIMA to LAD and tricuspid valve repair complicated by tamponade and pericardial window, end-stage renal disease on hemodialysis, hypertension, hyperlipidemia, atrial fibrillation on Eliquis admitted to the hospital for shortness of breath. History Obtained From:  patient, electronic medical record    Patient hyperkalemic on admission 6.4  EKG atrial flutter 58 bpm  Troponins flat 130  =================  Hospital course  3/31/2024  Patient now in ICU due to hypotension  Currently patient on Levophed  Patient looks comfortable denies chest pain or shortness of breath  Still hyperkalemic but condition improving    2024: Patient is resting comfortably in bed and appears to be in no acute distress at this time.  She is on room air.  She is talking to me comfortably without any distress.  Patient went for dialysis today, levo was started during dialysis.  Patient worried about her blood pressures.  Patient denies any chest pain, or other anginal type symptoms.  Reports that her shortness of breath is at baseline and feels a little dizzy during time of examination.  Creatinine 5.56, GFR 7.9, hemoglobin 12.4, potassium within normal range  Chest x-ray from [facility] showed no right or left pleural effusion  Echo from 3/6/2024 showed EF of 55 to 60%.    2024: Patient is resting comfortably in bed during time of examination.  She is receiving hemodialysis.  She appears to be in no acute distress.  She is talking to me comfortably.  She is on 2 L of oxygen via nasal cannula.  Patient reports that she is worried about her low blood pressures.  Remains on midodrine and levo  Patient reports  strips (ASCENSIA AUTODISC VI;ONE TOUCH ULTRA TEST VI) strip, 1 each by In Vitro route daily As needed.  Continuous Blood Gluc Sensor (FREESTYLE KAIT 2 SENSOR) [employer], Replace every 14 days E11.65  Continuous Blood Gluc  (FREESTYLE KAIT 2 READER) [place], Give 1   Blood Glucose Monitoring Suppl (ONETOUCH VERIO REFLECT) w/Device KIT, Give 1 meter dx E11.65  Blood Glucose Monitoring Suppl (ONETOUCH VERIO) w/Device KIT, AS DIRECTED  Alcohol Swabs (EASY TOUCH ALCOHOL PREP MEDIUM) 70 % PADS, USE AS DIRECTED THREE TIMES A DAY  FreeStyle Lancets MISC, Test 4x daily  melatonin 3 MG TABS tablet, Take 10 mg by mouth nightly as needed Indications: Trouble Sleeping  albuterol (PROVENTIL) 2.5 MG/0.5ML NEBU nebulizer solution, Take 0.5 mLs by nebulization every 4 hours as needed for Wheezing or Shortness of Breath  LACTOBACILLUS PO, Take 2 capsules by mouth in the morning and 2 capsules in the evening. Indications: Nutritional Support. (Patient not taking: Reported on 3/29/2024)  insulin lispro (HUMALOG) 100 UNIT/ML injection vial, Inject 12 Units into the skin 3 times daily (before meals) Indications: Type 2 Diabetes (Patient not taking: Reported on 3/29/2024)  insulin lispro (HUMALOG) 100 UNIT/ML injection vial, Inject 0-4 Units into the skin nightly Indications: Type 2 Diabetes Inject as per sliding scale: If 201-250=1u; 251-300=2u; 301-350=3u; 351-400=4u. Call MD/NP if >400.  b complex-C-folic acid (NEPHROCAPS) 1 MG capsule, Take 1 capsule by mouth daily Indications: Dialysis Dependent Chronic Kidney Failure  pantoprazole (PROTONIX) 20 MG tablet, Take 1 tablet by mouth daily Indications: Gastroesophageal Reflux Disease  nitroGLYCERIN (NITROSTAT) 0.4 MG SL tablet, up to max of 3 total doses. If no relief after 1 dose, call 911. (Patient taking differently: Place 1 tablet under the tongue every 5 minutes as needed for Chest pain up to max of 3 total doses. If no relief after 1 dose, call 911.)  hydrOXYzine HCl

## 2024-04-06 NOTE — PROGRESS NOTES
Labs:  CBC:  Recent Labs     04/04/24 0427 04/05/24 0554 04/06/24 0433   WBC 13.0* 11.6* 10.1   RBC 3.72* 3.75* 3.56*   HGB 12.5 12.7 12.0   HCT 36.9* 37.0 35.8*   MCV 99.2* 98.7* 100.6*   RDW 16.9* 16.9* 16.7*   * 119* 106*       CHEMISTRIES:  Recent Labs     04/04/24 0427 04/05/24 0554 04/06/24 0433    137 136   K 3.1* 3.6 3.7   CL 92* 94* 91*   CO2 29 26 28   BUN 29* 21 17   CREATININE 5.79* 4.56* 3.68*   GLUCOSE 137* 223* 207*   MG 2.1 2.1 1.9       PT/INR:  No results for input(s): \"PROTIME\", \"INR\" in the last 72 hours.    APTT:  No results for input(s): \"APTT\" in the last 72 hours.    LIVER PROFILE:  Recent Labs     04/04/24 0427 04/05/24 0554 04/06/24 0433   AST 14 14 13   ALT <5 <5 <5   BILITOT 1.1* 1.2* 1.0*   ALKPHOS 84 88 83         Imaging Last 24 Hours:  XR CHEST PORTABLE    Result Date: 3/29/2024  EXAMINATION: ONE XRAY VIEW OF THE CHEST 3/29/2024 10:41 am COMPARISON: 12/25/2023 HISTORY: ORDERING SYSTEM PROVIDED HISTORY: SOB TECHNOLOGIST PROVIDED HISTORY: Reason for exam:->SOB What reading provider will be dictating this exam?->CRC FINDINGS: The heart is enlarged.  Postop sternotomy changes are noted The right lung is clear.  Left upper lobe is clear.  The left lung base is poorly visualized due to the overlying left heart border and left breast. There is no pneumothorax.     The right lung left upper lobe are clear The left lung base is poorly visualized due to the cardiomegaly and overlying left breast tissue as well as secondary to rotation. There is no appreciable right or left pleural effusion.     Assessment//Plan           Hospital Problems             Last Modified POA    * (Principal) Fluid overload 3/29/2024 Yes    Heart failure (HCC) 3/31/2024 Yes    Encounter for hospice care discussion 3/29/2024 Yes    Advanced care planning/counseling discussion 3/29/2024 Yes    Goals of care, counseling/discussion 3/29/2024 Yes    Palliative care encounter 3/29/2024 Yes

## 2024-04-06 NOTE — PLAN OF CARE
Problem: Discharge Planning  Goal: Discharge to home or other facility with appropriate resources  Outcome: Progressing  Flowsheets (Taken 4/6/2024 0800 by Ying Gaines, RN)  Discharge to home or other facility with appropriate resources: Identify barriers to discharge with patient and caregiver     Problem: Safety - Adult  Goal: Free from fall injury  Outcome: Progressing     Problem: Skin/Tissue Integrity  Goal: Absence of new skin breakdown  Description: 1.  Monitor for areas of redness and/or skin breakdown  2.  Assess vascular access sites hourly  3.  Every 4-6 hours minimum:  Change oxygen saturation probe site  4.  Every 4-6 hours:  If on nasal continuous positive airway pressure, respiratory therapy assess nares and determine need for appliance change or resting period.  Outcome: Progressing     Problem: Chronic Conditions and Co-morbidities  Goal: Patient's chronic conditions and co-morbidity symptoms are monitored and maintained or improved  Outcome: Progressing  Flowsheets (Taken 4/6/2024 0800 by Ying Gaines, RN)  Care Plan - Patient's Chronic Conditions and Co-Morbidity Symptoms are Monitored and Maintained or Improved: Monitor and assess patient's chronic conditions and comorbid symptoms for stability, deterioration, or improvement     Problem: Pain  Goal: Verbalizes/displays adequate comfort level or baseline comfort level  Outcome: Progressing     Problem: Respiratory - Adult  Goal: Achieves optimal ventilation and oxygenation  Outcome: Progressing  Flowsheets (Taken 4/6/2024 0800 by Ying Gaines, RN)  Achieves optimal ventilation and oxygenation: Assess for changes in respiratory status     Problem: Cardiovascular - Adult  Goal: Maintains optimal cardiac output and hemodynamic stability  Outcome: Progressing  Flowsheets (Taken 4/6/2024 0800 by Ying Gaines, RN)  Maintains optimal cardiac output and hemodynamic stability: Monitor blood pressure and heart rate  Goal: Absence of  cardiac dysrhythmias or at baseline  Outcome: Progressing  Flowsheets (Taken 4/6/2024 0800 by Ying Gaines, RN)  Absence of cardiac dysrhythmias or at baseline: Monitor cardiac rate and rhythm     Problem: Skin/Tissue Integrity - Adult  Goal: Skin integrity remains intact  Outcome: Progressing  Flowsheets (Taken 4/6/2024 0800 by Ying Gaines, RN)  Skin Integrity Remains Intact: Monitor for areas of redness and/or skin breakdown  Goal: Incisions, wounds, or drain sites healing without S/S of infection  Outcome: Progressing  Flowsheets (Taken 4/6/2024 0800 by Ying Gaines, RN)  Incisions, Wounds, or Drain Sites Healing Without Sign and Symptoms of Infection: ADMISSION and DAILY: Assess and document risk factors for pressure ulcer development     Problem: Musculoskeletal - Adult  Goal: Return mobility to safest level of function  Outcome: Progressing  Flowsheets (Taken 4/6/2024 0800 by Ying Gaines, RN)  Return Mobility to Safest Level of Function: Assess patient stability and activity tolerance for standing, transferring and ambulating with or without assistive devices     Problem: Gastrointestinal - Adult  Goal: Minimal or absence of nausea and vomiting  Outcome: Progressing  Flowsheets (Taken 4/6/2024 0800 by Ying Gaines, RN)  Minimal or absence of nausea and vomiting: Administer IV fluids as ordered to ensure adequate hydration  Goal: Maintains or returns to baseline bowel function  Outcome: Progressing  Flowsheets (Taken 4/6/2024 0800 by Ying Gaines, RN)  Maintains or returns to baseline bowel function: Assess bowel function  Goal: Maintains adequate nutritional intake  Outcome: Progressing  Flowsheets (Taken 4/6/2024 0800 by Ying Gaines, RN)  Maintains adequate nutritional intake: Monitor percentage of each meal consumed     Problem: Genitourinary - Adult  Goal: Absence of urinary retention  Outcome: Progressing  Flowsheets (Taken 4/6/2024 0800 by Ying Gaines, RN)  Absence of

## 2024-04-06 NOTE — PROGRESS NOTES
edema, swelling right lower extremity  Neuro:  Grossly normal  Skin: No rashes or nodules noted.  Lymph node:  no cervical nodes  Urology: No Reynolds   Psychiatric: appropriate    Medications:  Scheduled Meds:   insulin glargine  15 Units SubCUTAneous BID    midodrine  20 mg Oral TID WC    insulin lispro  0-16 Units SubCUTAneous TID WC    insulin lispro  0-4 Units SubCUTAneous Nightly    mupirocin   Topical BID    ceFAZolin  2,000 mg IntraVENous Q12H    hydrOXYzine HCl  25 mg Oral BID    sodium chloride flush  5-40 mL IntraVENous 2 times per day    ARIPiprazole  5 mg Oral Daily    atorvastatin  40 mg Oral Nightly    pantoprazole  20 mg Oral Daily    sevelamer  800 mg Oral TID    apixaban  2.5 mg Oral BID    aspirin EC  81 mg Oral Daily    sertraline  50 mg Oral Nightly    miconazole   Topical BID    albuterol  2.5 mg Nebulization BID RT       PRN Meds:  HYDROcodone 5 mg - acetaminophen **OR** HYDROcodone 5 mg - acetaminophen, perflutren lipid microspheres, sennosides-docusate sodium, sodium chloride flush, sodium chloride, ondansetron **OR** ondansetron, polyethylene glycol, acetaminophen **OR** acetaminophen, albumin human 25%, glucose, dextrose bolus **OR** dextrose bolus, glucagon (rDNA), dextrose, albuterol    Results: reviewed by me   CBC:   Recent Labs     04/04/24 0427 04/05/24 0554 04/06/24  0433   WBC 13.0* 11.6* 10.1   HGB 12.5 12.7 12.0   HCT 36.9* 37.0 35.8*   MCV 99.2* 98.7* 100.6*   * 119* 106*       BMP:   Recent Labs     04/04/24 0427 04/05/24  0554 04/06/24  0433    137 136   K 3.1* 3.6 3.7   CL 92* 94* 91*   CO2 29 26 28   BUN 29* 21 17   CREATININE 5.79* 4.56* 3.68*       LIVER PROFILE:   Recent Labs     04/04/24 0427 04/05/24  0554 04/06/24  0433   AST 14 14 13   ALT <5 <5 <5   BILITOT 1.1* 1.2* 1.0*   ALKPHOS 84 88 83       PT/INR: No results for input(s): \"PROTIME\", \"INR\" in the last 72 hours.  APTT: No results for input(s): \"APTT\" in the last 72 hours.  UA:No results for  input(s): \"NITRITE\", \"COLORU\", \"PHUR\", \"LABCAST\", \"WBCUA\", \"RBCUA\", \"MUCUS\", \"TRICHOMONAS\", \"YEAST\", \"BACTERIA\", \"CLARITYU\", \"SPECGRAV\", \"LEUKOCYTESUR\", \"UROBILINOGEN\", \"BILIRUBINUR\", \"BLOODU\", \"GLUCOSEU\", \"AMORPHOUS\" in the last 72 hours.    Invalid input(s): \"KETONESU\"    Cultures:  Negative so far  Films:  CXR films reviewed by me and it showed bilateral pleural effusion    Arterial ultrasound shows no subclavian artery stenosis    Assessment:  This is a critically ill patient at risk of deterioration / death , needing close ICU monitoring and intervention due to below noted problems   Shock etiology is not clear,?  Septic,    End-stage renal disease on dialysis  Volume overload  Swelling right lower extremity, no DVT  A-fib on low-dose Eliquis currently on hold    Recommendation  Continue Levophed target mean arterial pressure 65-70  Continue midodrine  Monitor blood pressure and lower extremities  Continue Eliquis   Target blood sugar 140-180   Add Florinef          Due to the immediate potential for life-threatening deterioration due to shock I spent 35  minutes providing critical care.  This time is excluding time spent performing procedures.          Electronically signed by Say Marsh MD,  [facility] ,on 4/6/2024 at 7:58 AM

## 2024-04-06 NOTE — PROGRESS NOTES
Renal Progress Note  Assessment and Plan:   64-year-old lady with end-stage renal disease on hemodialysis Tuesday Thursday Saturday.  Chronic hypotension maintained on large dose midodrine.  Admitted with SOB.  Sig above dry weight.  Doesn't make urine.  EF is normal.  Transferred to ICU with hypotension. Her DW is at 97 kg as outpt.  Had been leaving above that     Plan/  Change today treatment to [facility] for the next hour  then [facility]  2. Try to get off levo after that  3. D/c lokelma.  3k bath  4. Ok for eliquis    Patient Active Problem List:     Coronary artery disease involving native coronary artery of native heart with angina pectoris (HCC)     Schizophrenia, paranoid, chronic     Metabolic syndrome     Vitamin B 12 deficiency     Cerebral microvascular disease     Mixed hyperlipidemia     Other hammer toe (acquired)     Vitamin D insufficiency     Incontinence of urine     Diabetic nephropathy with proteinuria (HCC)     Essential (primary) hypertension     History of type C viral hepatitis     Urinary incontinence due to cognitive impairment     History of seizures     Stented coronary artery-plan is to stay on Plavix indefinately per Dr Walker     Diabetes mellitus ([facility])     Controlled type 2 diabetes mellitus with diabetic neuropathy, with long-term current use of insulin (HCC)     Hemiparesis, left (HCC)     Angina, class II (HCC)     Pain, unspecified     Tardive dyskinesia     Shortness of breath     Uncontrolled type 2 diabetes mellitus with hyperglycemia (HCC)     Chronic diastolic congestive heart failure (HCC)     [place] (obstructive sleep apnea)     Pulmonary hypertension, unspecified (HCC)     Class 2 severe obesity with serious comorbidity and body mass index (BMI) of 36.0 to 36.9 in adult (HCC)     Edema     Closed supracondylar fracture of right humerus     Other chronic pain     Palliative care patient     Recurrent falls     Renal failure     Difficulty in walking     ESRD (end stage  renal disease) on dialysis ([facility])     Weakness     Other seizures ([facility])     Moderate persistent asthma without complication     COVID-19     Post PTCA     Falls frequently     Chest wall contusion, left, initial encounter     Headache, unspecified     Paranoid schizophrenia ([facility])     Compression fracture of spine ([facility])     Closed rib fracture     Depression     Chronic obstructive pulmonary disease (HCC)     Critical illness polyneuropathy ([facility])     Multiple closed fractures of ribs of right side     Nonrheumatic mitral valve regurgitation     Nonrheumatic tricuspid valve regurgitation     Need for extended care facility     Chronic pain of both shoulders     Hemodialysis-associated hypotension     Anginal chest pain at rest ([facility])     Chest pain     Unstable angina ([facility])     NSTEMI (non-ST elevated myocardial infarction) ([facility])     CKD (chronic kidney disease) stage 4, GFR 15-29 ml/min ([facility])     Hyperkalemia     Impaired mobility and activities of daily living dt polyneuropathy and reccent fall      Dialysis patient ([facility])     Unspecified open wound of right upper arm, initial encounter     Multiple closed fractures of right lower extremity and ribs     Closed T11 fracture ([facility])     Encephalopathy acute     MRSA bacteremia     Sepsis due to Enterococcus ([facility])     Local infection due to central venous catheter     DJD (degenerative joint disease), cervical     Closed head injury     Sarcopenia     Fall from standing     Septicemia ([facility])     Chronic hepatitis C ([facility])     Catheter-related bloodstream infection     Enterococcus faecalis infection     Cervical stenosis of spinal canal     Ataxic gait     Lumbar stenosis with neurogenic claudication     Falls infrequently     Infection of venous access port     Diarrhea     Anemia, unspecified     Eczema     AMS (altered mental status)     Heart failure ([facility])     Interstitial lung disease ([facility])     Cellulitis of left hand     Chronic osteomyelitis of knee ([facility])

## 2024-04-06 NOTE — PLAN OF CARE
Problem: Discharge Planning  Goal: Discharge to home or other facility with appropriate resources  Outcome: Progressing     Problem: Safety - Adult  Goal: Free from fall injury  Outcome: Progressing     Problem: Skin/Tissue Integrity  Goal: Absence of new skin breakdown  Description: 1.  Monitor for areas of redness and/or skin breakdown  2.  Assess vascular access sites hourly  3.  Every 4-6 hours minimum:  Change oxygen saturation probe site  4.  Every 4-6 hours:  If on nasal continuous positive airway pressure, respiratory therapy assess nares and determine need for appliance change or resting period.  Outcome: Progressing     Problem: Chronic Conditions and Co-morbidities  Goal: Patient's chronic conditions and co-morbidity symptoms are monitored and maintained or improved  Outcome: Progressing     Problem: Pain  Goal: Verbalizes/displays adequate comfort level or baseline comfort level  Outcome: Progressing  Flowsheets  Taken 4/6/2024 0000  Verbalizes/displays adequate comfort level or baseline comfort level:   Encourage patient to monitor pain and request assistance   Assess pain using appropriate pain scale  Taken 4/5/2024 2000  Verbalizes/displays adequate comfort level or baseline comfort level:   Encourage patient to monitor pain and request assistance   Assess pain using appropriate pain scale     Problem: Respiratory - Adult  Goal: Achieves optimal ventilation and oxygenation  Outcome: Progressing     Problem: Cardiovascular - Adult  Goal: Maintains optimal cardiac output and hemodynamic stability  Outcome: Progressing  Goal: Absence of cardiac dysrhythmias or at baseline  Outcome: Progressing     Problem: Skin/Tissue Integrity - Adult  Goal: Skin integrity remains intact  Outcome: Progressing  Goal: Incisions, wounds, or drain sites healing without S/S of infection  Outcome: Progressing     Problem: Musculoskeletal - Adult  Goal: Return mobility to safest level of function  Outcome: Progressing      Problem: Gastrointestinal - Adult  Goal: Minimal or absence of nausea and vomiting  Outcome: Progressing  Goal: Maintains or returns to baseline bowel function  Outcome: Progressing  Goal: Maintains adequate nutritional intake  Outcome: Progressing     Problem: Genitourinary - Adult  Goal: Absence of urinary retention  Outcome: Progressing     Problem: Infection - Adult  Goal: Absence of infection at discharge  Outcome: Progressing     Problem: Metabolic/Fluid and Electrolytes - Adult  Goal: Electrolytes maintained within normal limits  Outcome: Progressing  Goal: Hemodynamic stability and optimal renal function maintained  Outcome: Progressing  Goal: Glucose maintained within prescribed range  Outcome: Progressing     Problem: Hematologic - Adult  Goal: Maintains hematologic stability  Outcome: Progressing     Problem: Nutrition Deficit:  Goal: Optimize nutritional status  4/6/2024 0026 by Mariana Mayo RN  Outcome: Progressing  4/5/2024 1507 by Della Kelly RD, LD  Flowsheets (Taken 4/5/2024 1507)  Nutrient intake appropriate for improving, restoring, or maintaining nutritional needs:   Assess nutritional status and recommend course of action   Monitor oral intake, labs, and treatment plans   Recommend appropriate diets, oral nutritional supplements, and vitamin/mineral supplements

## 2024-04-06 NOTE — PROGRESS NOTES
19:00-07:30 Shift summary:      Assessments completed (see flowsheets). Pt A&Ox4, able to make needs and wants known. Pleasant and cooperative with staff.  Positive thrill and bruit noted to LUE AV fistula.  Right subclavian double lumen Kenyon remains in place, dressing C/D/I, no s/s of infection noted. Lumens patent with positive blood return noted. IV drip infusing/titrated per order (see eMAR), pt tolerating well. Pt took PO medications whole with thin liquids w/o difficulty.  Pt c/o nausea PRN medication administered per MD order and pt request with positive effect. Dressing changes completed per orders, pt tolerated well. Labs drawn, labeled and sent to lab. Bed bath and complete linen change provided, pt tolerated well.  Bedside handoff report given to on coming RN. Safety measures in place.

## 2024-04-07 LAB
ALBUMIN SERPL-MCNC: 3.9 G/DL (ref 3.5–4.6)
ALP SERPL-CCNC: 87 U/L (ref 40–130)
ALT SERPL-CCNC: <5 U/L (ref 0–33)
ANION GAP SERPL CALCULATED.3IONS-SCNC: 16 MEQ/L (ref 9–15)
AST SERPL-CCNC: 12 U/L (ref 0–35)
BASOPHILS # BLD: 0.1 K/UL (ref 0–0.2)
BASOPHILS NFR BLD: 0.5 %
BILIRUB SERPL-MCNC: 0.9 MG/DL (ref 0.2–0.7)
BUN SERPL-MCNC: 17 MG/DL (ref 8–23)
CALCIUM SERPL-MCNC: 8.9 MG/DL (ref 8.5–9.9)
CHLORIDE SERPL-SCNC: 93 MEQ/L (ref 95–107)
CO2 SERPL-SCNC: 28 MEQ/L (ref 20–31)
CREAT SERPL-MCNC: 3.95 MG/DL (ref 0.5–0.9)
EOSINOPHIL # BLD: 0.5 K/UL (ref 0–0.7)
EOSINOPHIL NFR BLD: 4.1 %
ERYTHROCYTE [DISTWIDTH] IN BLOOD BY AUTOMATED COUNT: 16.5 % (ref 11.5–14.5)
GLOBULIN SER CALC-MCNC: 3.1 G/DL (ref 2.3–3.5)
GLUCOSE BLD-MCNC: 177 MG/DL (ref 70–99)
GLUCOSE BLD-MCNC: 216 MG/DL (ref 70–99)
GLUCOSE SERPL-MCNC: 161 MG/DL (ref 70–99)
HCT VFR BLD AUTO: 38 % (ref 37–47)
HGB BLD-MCNC: 12.7 G/DL (ref 12–16)
LYMPHOCYTES # BLD: 1.1 K/UL (ref 1–4.8)
LYMPHOCYTES NFR BLD: 9.6 %
MAGNESIUM SERPL-MCNC: 2 MG/DL (ref 1.7–2.4)
MCH RBC QN AUTO: 33.3 PG (ref 27–31.3)
MCHC RBC AUTO-ENTMCNC: 33.4 % (ref 33–37)
MCV RBC AUTO: 99.7 FL (ref 79.4–94.8)
MONOCYTES # BLD: 1.1 K/UL (ref 0.2–0.8)
MONOCYTES NFR BLD: 9.5 %
NEUTROPHILS # BLD: 8.5 K/UL (ref 1.4–6.5)
NEUTS SEG NFR BLD: 75.9 %
PERFORMED ON: ABNORMAL
PERFORMED ON: ABNORMAL
PLATELET # BLD AUTO: 141 K/UL (ref 130–400)
POTASSIUM SERPL-SCNC: 3.7 MEQ/L (ref 3.4–4.9)
PROT SERPL-MCNC: 7 G/DL (ref 6.3–8)
RBC # BLD AUTO: 3.81 M/UL (ref 4.2–5.4)
SODIUM SERPL-SCNC: 137 MEQ/L (ref 135–144)
WBC # BLD AUTO: 11.2 K/UL (ref 4.8–10.8)

## 2024-04-07 PROCEDURE — 6360000002 HC RX W HCPCS: Performed by: COLON & RECTAL SURGERY

## 2024-04-07 PROCEDURE — 80053 COMPREHEN METABOLIC PANEL: CPT

## 2024-04-07 PROCEDURE — P9047 ALBUMIN (HUMAN), 25%, 50ML: HCPCS | Performed by: INTERNAL MEDICINE

## 2024-04-07 PROCEDURE — 94640 AIRWAY INHALATION TREATMENT: CPT

## 2024-04-07 PROCEDURE — 94761 N-INVAS EAR/PLS OXIMETRY MLT: CPT

## 2024-04-07 PROCEDURE — 99233 SBSQ HOSP IP/OBS HIGH 50: CPT | Performed by: INTERNAL MEDICINE

## 2024-04-07 PROCEDURE — 99291 CRITICAL CARE FIRST HOUR: CPT | Performed by: INTERNAL MEDICINE

## 2024-04-07 PROCEDURE — 2500000003 HC RX 250 WO HCPCS: Performed by: COLON & RECTAL SURGERY

## 2024-04-07 PROCEDURE — 6370000000 HC RX 637 (ALT 250 FOR IP): Performed by: COLON & RECTAL SURGERY

## 2024-04-07 PROCEDURE — 6360000002 HC RX W HCPCS: Performed by: INTERNAL MEDICINE

## 2024-04-07 PROCEDURE — 2700000000 HC OXYGEN THERAPY PER DAY

## 2024-04-07 PROCEDURE — 85025 COMPLETE CBC W/AUTO DIFF WBC: CPT

## 2024-04-07 PROCEDURE — 99232 SBSQ HOSP IP/OBS MODERATE 35: CPT | Performed by: INTERNAL MEDICINE

## 2024-04-07 PROCEDURE — 83735 ASSAY OF MAGNESIUM: CPT

## 2024-04-07 PROCEDURE — 6370000000 HC RX 637 (ALT 250 FOR IP): Performed by: INTERNAL MEDICINE

## 2024-04-07 PROCEDURE — 2580000003 HC RX 258: Performed by: INTERNAL MEDICINE

## 2024-04-07 PROCEDURE — 2580000003 HC RX 258: Performed by: COLON & RECTAL SURGERY

## 2024-04-07 PROCEDURE — 97162 PT EVAL MOD COMPLEX 30 MIN: CPT

## 2024-04-07 PROCEDURE — 2000000000 HC ICU R&B

## 2024-04-07 RX ORDER — ALBUMIN (HUMAN) 12.5 G/50ML
25 SOLUTION INTRAVENOUS EVERY 8 HOURS
Status: DISCONTINUED | OUTPATIENT
Start: 2024-04-07 | End: 2024-04-11

## 2024-04-07 RX ADMIN — MICONAZOLE NITRATE: 2 POWDER TOPICAL at 19:45

## 2024-04-07 RX ADMIN — SEVELAMER CARBONATE 800 MG: 800 TABLET, FILM COATED ORAL at 08:12

## 2024-04-07 RX ADMIN — INSULIN GLARGINE 15 UNITS: 100 INJECTION, SOLUTION SUBCUTANEOUS at 08:15

## 2024-04-07 RX ADMIN — APIXABAN 2.5 MG: 2.5 TABLET, FILM COATED ORAL at 08:13

## 2024-04-07 RX ADMIN — INSULIN GLARGINE 15 UNITS: 100 INJECTION, SOLUTION SUBCUTANEOUS at 20:34

## 2024-04-07 RX ADMIN — ALBUTEROL SULFATE 2.5 MG: 2.5 SOLUTION RESPIRATORY (INHALATION) at 15:44

## 2024-04-07 RX ADMIN — SENNOSIDES AND DOCUSATE SODIUM 2 TABLET: 50; 8.6 TABLET ORAL at 14:38

## 2024-04-07 RX ADMIN — HYDROXYZINE HYDROCHLORIDE 25 MG: 25 TABLET ORAL at 19:44

## 2024-04-07 RX ADMIN — AMOXICILLIN 500 MG: 500 CAPSULE ORAL at 08:12

## 2024-04-07 RX ADMIN — ATORVASTATIN CALCIUM 40 MG: 40 TABLET, FILM COATED ORAL at 19:45

## 2024-04-07 RX ADMIN — Medication 7 MCG/MIN: at 06:16

## 2024-04-07 RX ADMIN — SODIUM CHLORIDE, PRESERVATIVE FREE 10 ML: 5 INJECTION INTRAVENOUS at 07:39

## 2024-04-07 RX ADMIN — SERTRALINE HYDROCHLORIDE 50 MG: 50 TABLET ORAL at 19:44

## 2024-04-07 RX ADMIN — PANTOPRAZOLE SODIUM 20 MG: 20 TABLET, DELAYED RELEASE ORAL at 08:13

## 2024-04-07 RX ADMIN — MIDODRINE HYDROCHLORIDE 20 MG: 10 TABLET ORAL at 17:08

## 2024-04-07 RX ADMIN — ALBUMIN (HUMAN) 25 G: 0.25 INJECTION, SOLUTION INTRAVENOUS at 10:53

## 2024-04-07 RX ADMIN — ASPIRIN 81 MG: 81 TABLET, COATED ORAL at 08:13

## 2024-04-07 RX ADMIN — CEFAZOLIN 2000 MG: 2 INJECTION, POWDER, FOR SOLUTION INTRAMUSCULAR; INTRAVENOUS at 15:41

## 2024-04-07 RX ADMIN — ALBUMIN (HUMAN) 25 G: 0.25 INJECTION, SOLUTION INTRAVENOUS at 19:43

## 2024-04-07 RX ADMIN — CEFAZOLIN 2000 MG: 2 INJECTION, POWDER, FOR SOLUTION INTRAMUSCULAR; INTRAVENOUS at 06:12

## 2024-04-07 RX ADMIN — FLUDROCORTISONE ACETATE 0.1 MG: 0.1 TABLET ORAL at 08:12

## 2024-04-07 RX ADMIN — ARIPIPRAZOLE 5 MG: 5 TABLET ORAL at 08:13

## 2024-04-07 RX ADMIN — MIDODRINE HYDROCHLORIDE 20 MG: 10 TABLET ORAL at 11:56

## 2024-04-07 RX ADMIN — MUPIROCIN: 20 OINTMENT TOPICAL at 19:44

## 2024-04-07 RX ADMIN — MICONAZOLE NITRATE: 2 POWDER TOPICAL at 08:56

## 2024-04-07 RX ADMIN — MUPIROCIN: 20 OINTMENT TOPICAL at 12:39

## 2024-04-07 RX ADMIN — APIXABAN 2.5 MG: 2.5 TABLET, FILM COATED ORAL at 20:35

## 2024-04-07 RX ADMIN — SEVELAMER CARBONATE 800 MG: 800 TABLET, FILM COATED ORAL at 14:38

## 2024-04-07 RX ADMIN — SODIUM CHLORIDE, PRESERVATIVE FREE 10 ML: 5 INJECTION INTRAVENOUS at 19:45

## 2024-04-07 RX ADMIN — MIDODRINE HYDROCHLORIDE 20 MG: 10 TABLET ORAL at 08:13

## 2024-04-07 RX ADMIN — SEVELAMER CARBONATE 800 MG: 800 TABLET, FILM COATED ORAL at 19:45

## 2024-04-07 RX ADMIN — HYDROXYZINE HYDROCHLORIDE 25 MG: 25 TABLET ORAL at 07:45

## 2024-04-07 RX ADMIN — ONDANSETRON 4 MG: 2 INJECTION INTRAMUSCULAR; INTRAVENOUS at 20:34

## 2024-04-07 RX ADMIN — MECLIZINE 25 MG: 12.5 TABLET ORAL at 08:12

## 2024-04-07 ASSESSMENT — PAIN SCALES - GENERAL
PAINLEVEL_OUTOF10: 0

## 2024-04-07 NOTE — PROGRESS NOTES
Received report at bedside from FLAQIUTO Grissom. Pt on levo. Pt is on room air. Tolerated oral fluids, no complaints of nausea overnight.Turned pt as documented chart. Titrated levo as documented in EMAR. Pt rested comfortably. Uneventful night

## 2024-04-07 NOTE — PROGRESS NOTES
Renal Progress Note  Assessment and Plan:   64-year-old lady with end-stage renal disease on hemodialysis Tuesday Thursday Saturday.  Chronic hypotension maintained on large dose midodrine.  Admitted with SOB.  Sig above dry weight.  Doesn't make urine.  EF is normal.  Transferred to ICU with hypotension. Her DW is at 97 kg as outpt.  Had been leaving above that     Plan/  Will follow patient renal replacement therapy Tuesday Thursday Saturday   2. Try to get off levo after that  3. D/c lokelma.  3k bath  4. Ok for eliquis    Patient Active Problem List:     Coronary artery disease involving native coronary artery of native heart with angina pectoris (HCC)     Schizophrenia, paranoid, chronic     Metabolic syndrome     Vitamin B 12 deficiency     Cerebral microvascular disease     Mixed hyperlipidemia     Other hammer toe (acquired)     Vitamin D insufficiency     Incontinence of urine     Diabetic nephropathy with proteinuria (HCC)     Essential (primary) hypertension     History of type C viral hepatitis     Urinary incontinence due to cognitive impairment     History of seizures     Stented coronary artery-plan is to stay on Plavix indefinately per Dr Walker     Diabetes mellitus ([facility])     Controlled type 2 diabetes mellitus with diabetic neuropathy, with long-term current use of insulin ([facility])     Hemiparesis, left (HCC)     Angina, class II ([facility])     Pain, unspecified     Tardive dyskinesia     Shortness of breath     Uncontrolled type 2 diabetes mellitus with hyperglycemia ([facility])     Chronic diastolic congestive heart failure (HCC)     [place] (obstructive sleep apnea)     Pulmonary hypertension, unspecified ([facility])     Class 2 severe obesity with serious comorbidity and body mass index (BMI) of 36.0 to 36.9 in adult (HCC)     Edema     Closed supracondylar fracture of right humerus     Other chronic pain     Palliative care patient     Recurrent falls     Renal failure     Difficulty in walking     ESRD (end stage

## 2024-04-07 NOTE — PROGRESS NOTES
rub/gallop. No RV heave.  Lungs: Clear to ascultation, no rales/wheezing/rhonchi. Good chest wall excursion.  Abdomen: Soft non tender non distended   extremities: No clubbing/cyanosis, no edema.   Skin: Warm, dry, normal turgor, no rash, no bruise, no petichiae.  Neuro: No myoclonus or tremor.   Psych: Normal affect    Results/ Medications reviewed 4/7/2024, 10:32 AM     Laboratory, Microbiology, Pathology, Radiology, Cardiology, Medications and Transcriptions reviewed  Scheduled Meds:   albumin human 25%  25 g IntraVENous Q8H    fludrocortisone  0.1 mg Oral Daily    meclizine  25 mg Oral Daily    amoxicillin  500 mg Oral Daily    insulin glargine  15 Units SubCUTAneous BID    midodrine  20 mg Oral TID WC    insulin lispro  0-16 Units SubCUTAneous TID WC    insulin lispro  0-4 Units SubCUTAneous Nightly    mupirocin   Topical BID    ceFAZolin  2,000 mg IntraVENous Q12H    hydrOXYzine HCl  25 mg Oral BID    sodium chloride flush  5-40 mL IntraVENous 2 times per day    ARIPiprazole  5 mg Oral Daily    atorvastatin  40 mg Oral Nightly    pantoprazole  20 mg Oral Daily    sevelamer  800 mg Oral TID    apixaban  2.5 mg Oral BID    aspirin EC  81 mg Oral Daily    sertraline  50 mg Oral Nightly    miconazole   Topical BID    albuterol  2.5 mg Nebulization BID RT     Continuous Infusions:   norepinephrine 7 mcg/min (04/07/24 0643)    sodium chloride Stopped (03/30/24 1524)    dextrose         Recent Labs     04/05/24  0554 04/06/24  0433 04/07/24  0600   WBC 11.6* 10.1 11.2*   HGB 12.7 12.0 12.7   HCT 37.0 35.8* 38.0   MCV 98.7* 100.6* 99.7*   * 106* 141       Recent Labs     04/05/24  0554 04/06/24  0433 04/07/24  0600    136 137   K 3.6 3.7 3.7   CL 94* 91* 93*   CO2 26 28 28   BUN 21 17 17   CREATININE 4.56* 3.68* 3.95*       Recent Labs     04/05/24  0554 04/06/24  0433 04/07/24  0600   PROT 6.7 6.7 7.0       03/05/24    ECHO (TTE) COMPLETE (PRN CONTRAST/BUBBLE/STRAIN/3D) 03/06/2024  3:38 PM  (Final)    Interpretation Summary    Left Ventricle: Normal left ventricular systolic function with a visually estimated EF of 55 - 60%. Left ventricle size is normal. Normal wall thickness. Normal wall motion. Diastolic dysfunction present with normal LV EF.    Right Ventricle: Right ventricle is moderately dilated. Moderately reduced systolic function.    Mitral Valve: Moderately thickened leaflet. Mild annular calcification of the mitral valve. Mild to moderate regurgitation with an eccentrically directed jet and may underestimate severity.    Tricuspid Valve: Moderate to severe regurgitation. Severely elevated RVSP, consistent with severe pulmonary hypertension.    Left Atrium: Left atrium is mildly dilated.    Right Atrium: Right atrium is moderately dilated.    Image quality is technically difficult. Contrast used: Definity. Limited study due to patient's ability to tolerate test and procedure performed with the patient in a supine position.    Signed by: Morteza WOODS MD on 3/6/2024  3:38 PM       Impression:   Shock.  Possible septic unknown etiology  CAD prior PCI to the LAD, CABG LIMA to LAD   Tricuspid valve repair complicated by tamponade and pericardial window,   End-stage renal disease on hemodialysis,   Hypertension,   Hyperlipidemia,   Atrial fibrillation on Eliquis  Hyperkalemia  Hypotension.  Bilateral upper extremity arterial ultrasound to rule out upper extremity stenosis  TTE 3/6/2024 EF 55 to 60%  Plan:     ICU supportive care and management  Continue telemetry  Continue midodrine-wean off as tolerated  Wean off pressors as tolerated  Continue Eliquis 2.5 mg p.o. twice daily  Continue hemodialysis as per nephrology  Continue aspirin and atorvastatin  Please keep potassium between 4 and 5 magnesium above 2  Please keep hemoglobin above 8 and place above 50  Ischemic evaluation was recommended as outpatient    Comments:       Thank you for allowing us to participate in the care of this patient.

## 2024-04-07 NOTE — PROGRESS NOTES
Progress Note  Date:2024       [facility]  Patient Name:Michelle Le     YOB: 1960     Age:64 y.o.        Subjective    Subjective:  Symptoms:  She reports weakness.  No shortness of breath, malaise, cough, chest pain, headache, chest pressure, anorexia, diarrhea or anxiety.    Diet:  No nausea or vomiting.       Review of Systems   Respiratory:  Negative for cough and shortness of breath.    Cardiovascular:  Negative for chest pain.   Gastrointestinal:  Negative for anorexia, diarrhea, nausea and vomiting.   Neurological:  Positive for weakness.     Objective         Vitals Last 24 Hours:  TEMPERATURE:  Temp  Av °F (36.7 °C)  Min: 97.9 °F (36.6 °C)  Max: 98.2 °F (36.8 °C)  RESPIRATIONS RANGE: Resp  Av.9  Min: 12  Max: 29  PULSE OXIMETRY RANGE: SpO2  Av.2 %  Min: 90 %  Max: 100 %  PULSE RANGE: Pulse  Av  Min: 64  Max: 104  BLOOD PRESSURE RANGE: Systolic (24hrs), Av , Min:90 , Max:147   ; Diastolic (24hrs), Av, Min:21, Max:109    I/O (24Hr):    Intake/Output Summary (Last 24 hours) at 2024 1203  Last data filed at 2024 0643  Gross per 24 hour   Intake 644.46 ml   Output 3200 ml   Net -2555.54 ml       Objective:  General Appearance:  In no acute distress.    Vital signs: (most recent): Blood pressure (!) 122/38, pulse 79, temperature 97.9 °F (36.6 °C), temperature source Oral, resp. rate 16, height 1.702 m (5' 7\"), weight 96.6 kg (212 lb 15.4 oz), SpO2 98 %, not currently breastfeeding.    HEENT: Normal HEENT exam.    Lungs:  There are decreased breath sounds.    Heart: S1 normal and S2 normal.  No gallop.   Abdomen: Abdomen is soft.  Bowel sounds are normal.   There is no epigastric area or suprapubic area tenderness.     Extremities: There is no deformity.    Neurological: Patient is alert and oriented to person, place and time.    Pupils:  Pupils are equal, round, and reactive to light.    Skin:  Warm.      Labs/Imaging/Diagnostics

## 2024-04-07 NOTE — PROGRESS NOTES
Pulmonary & Critical Care Medicine ICU Progress Note  Chief complaint : Shock    Subjunctive/24 hour events :   Patient seen and examined during multidisciplinary rounds with RN, charge nurse, RT, pharmacy, dietitian, and social service.      No chest pain, no shortness of breath, no dizziness, no headache.  Still on Levophed at 8 mg/min, no fever, no nausea or vomiting.  She is on room air saturation 97%    New information updated in the note today, rest of the examination did not change compared to yesterday.  Social History     Tobacco Use    Smoking status: Never     Passive exposure: Never    Smokeless tobacco: Never   Substance Use Topics    Alcohol use: No     Alcohol/week: 0.0 standard drinks of alcohol         Problem Relation Age of Onset    Cancer Mother 68        survived    Breast Cancer Mother     Hypertension Father     Diabetes Sister     Mental Illness Sister     Colon Cancer Neg Hx        No results for input(s): \"PHART\", \"YUY4DXE\", \"PO2ART\" in the last 72 hours.    MV Settings:     / / /            IV:   norepinephrine 7 mcg/min (04/07/24 0643)    sodium chloride Stopped (03/30/24 1524)    dextrose         Vitals:  BP (!) 116/24   Pulse 71   Temp 98 °F (36.7 °C) (Oral)   Resp 16   Ht 1.702 m (5' 7\")   Wt 96.6 kg (212 lb 15.4 oz)   LMP  (LMP Unknown)   SpO2 98%   BMI 33.35 kg/m²    Tmax:        Intake/Output Summary (Last 24 hours) at 4/7/2024 0821  Last data filed at 4/7/2024 0643  Gross per 24 hour   Intake 650.82 ml   Output 3200 ml   Net -2549.18 ml         EXAM:  General: alert, cooperative, no distress  Head: normocephalic, atraumatic  Eyes:No gross abnormalities.  ENT:  MMM no lesions  Neck:  supple and no masses  Chest : clear to auscultation bilaterally- no wheezes, rales or rhonchi, normal air movement, no respiratory distress  Heart:: Heart sounds are normal.  Regular rate and rhythm without murmur, gallop or rub.  ABD:  symmetric, soft, non-tender  Musculoskeletal : no cyanosis,  \"PROTIME\", \"INR\" in the last 72 hours.  APTT: No results for input(s): \"APTT\" in the last 72 hours.  UA:No results for input(s): \"NITRITE\", \"COLORU\", \"PHUR\", \"LABCAST\", \"WBCUA\", \"RBCUA\", \"MUCUS\", \"TRICHOMONAS\", \"YEAST\", \"BACTERIA\", \"CLARITYU\", \"SPECGRAV\", \"LEUKOCYTESUR\", \"UROBILINOGEN\", \"BILIRUBINUR\", \"BLOODU\", \"GLUCOSEU\", \"AMORPHOUS\" in the last 72 hours.    Invalid input(s): \"KETONESU\"    Cultures:  Negative so far  Films:  CXR films reviewed by me and it showed bilateral pleural effusion    Arterial ultrasound shows no subclavian artery stenosis    Assessment:  This is a critically ill patient at risk of deterioration / death , needing close ICU monitoring and intervention due to below noted problems   Shock etiology is not clear,?  Septic,    End-stage renal disease on dialysis  Volume overload  Swelling right lower extremity, no DVT  A-fib on low-dose Eliquis currently on hold    Recommendation  Continue Levophed target mean arterial pressure 65-70  Albumin 25 g 3 times daily  Continue midodrine  Continue Florinef  Monitor blood pressure and lower extremities  Continue Eliquis   Target blood sugar 140-180           Due to the immediate potential for life-threatening deterioration due to shock I spent 35  minutes providing critical care.  This time is excluding time spent performing procedures.          Electronically signed by Say Marsh MD,  [facility] ,on 4/7/2024 at 8:21 AM

## 2024-04-07 NOTE — PROGRESS NOTES
Infectious Diseases Inpatient Progress Note          HISTORY OF PRESENT ILLNESS:  Follow up sepsis/shock in a patient with hypogammaglobulinemia, end-stage renal disease on hemodialysis, infected upper extremity ulcers, diabetes mellitus type 2 on IV Ancef, well tolerated.  Patient's  is currently back on Levophed at 5 mics.    Patient reported nausea and abdominal pain.  Left arm ulcers.  Patient concerned about left hand redness and ulcers.  Patient has a tendency to scratch pick scabs from her skin.  Decreased appetite  No confusion.  Reports good appetite.   No cough.  Positive shortness of breath.   Positive generalized weakness  No fevers or chills  Currently connected to hemodialysis  Positive extensive bruising of upper chest and neck area  No rash or itching  No mouth soreness  Patient remains to be in ICU  Current Medications:     albumin human 25%  25 g IntraVENous Q8H    fludrocortisone  0.1 mg Oral Daily    meclizine  25 mg Oral Daily    amoxicillin  500 mg Oral Daily    insulin glargine  15 Units SubCUTAneous BID    midodrine  20 mg Oral TID WC    insulin lispro  0-16 Units SubCUTAneous TID WC    insulin lispro  0-4 Units SubCUTAneous Nightly    mupirocin   Topical BID    ceFAZolin  2,000 mg IntraVENous Q12H    hydrOXYzine HCl  25 mg Oral BID    sodium chloride flush  5-40 mL IntraVENous 2 times per day    ARIPiprazole  5 mg Oral Daily    atorvastatin  40 mg Oral Nightly    pantoprazole  20 mg Oral Daily    sevelamer  800 mg Oral TID    apixaban  2.5 mg Oral BID    aspirin EC  81 mg Oral Daily    sertraline  50 mg Oral Nightly    miconazole   Topical BID    albuterol  2.5 mg Nebulization BID RT       Allergies:  Codeine and Oxycontin [oxycodone hcl]      Review of Systems  14 system review is negative other than HPI    Physical Exam  Vitals:    04/07/24 0800 04/07/24 0900 04/07/24 0930 04/07/24 1000   BP: (!) 145/81 (!) 114/50 (!) 108/22 (!) 122/38   Pulse: (!) 104 97 81 79   Resp: 21 24 16 16   Temp:

## 2024-04-07 NOTE — PROGRESS NOTES
Physical Therapy Med Surg Initial Assessment  Facility/Department: [facility] ICU  Room: [place] [address]       NAME: Michelle Le  : 1960 (64 y.o.)  MRN: [id number]  CODE STATUS: Full Code    Date of Service: 2024    Patient Diagnosis(es): ESRD on dialysis (HCC) [N18.6, Z99.2]  Fluid overload [E87.70]  Acute decompensated heart failure (HCC) [I50.9]  Hypotension, unspecified hypotension type [I95.9]   Chief Complaint   Patient presents with    Shortness of Breath     Sob poss. Due to fluid overload     Patient Active Problem List    Diagnosis Date Noted    Unstable angina ([facility]) 2022    Chest pain     Infection of venous access port 2022    Lumbar stenosis with neurogenic claudication 07/15/2022    Falls infrequently 07/15/2022    Cervical stenosis of spinal canal     Ataxic gait     Chronic hepatitis C ([facility]) 07/10/2022    Catheter-related bloodstream infection     Enterococcus faecalis infection     Sepsis due to skin infection ([facility]) 2022    Sarcopenia 2022    Fall from standing     DJD (degenerative joint disease), cervical 2022    Closed head injury     MRSA bacteremia 2022    Sepsis due to Enterococcus ([facility])     Local infection due to central venous catheter     Impaired mobility and activities of daily living dt polyneuropathy and reccent fall  2022    Closed T11 fracture ([facility]) 2022    Encephalopathy acute 2022    Unspecified open wound of right upper arm, initial encounter 2022    Hyperkalemia 2022    CKD (chronic kidney disease) stage 4, GFR 15-29 ml/min ([facility]) 2022    Hypogammaglobulinemia ([facility]) 2024    Lack of intravenous access 2024    Pressure injury, unstageable, with suspected deep tissue injury ([facility]) 2024    Nonhealing nonsurgical wound 2024    Skin picking habit 2024    Hypotension 2024    ESRD on dialysis ([facility]) 2024    Acute decompensated heart failure (HCC) 2024    Constipation  renal disease) on dialysis ([facility]) 07/03/2020    Other seizures ([facility]) 07/03/2020    Paranoid schizophrenia ([facility]) 07/03/2020    Renal failure 06/28/2020    Recurrent falls 06/16/2020    Edema 12/23/2019    Closed supracondylar fracture of right humerus 12/23/2019    Other chronic pain 12/23/2019    Palliative care patient 12/23/2019    Class 2 severe obesity with serious comorbidity and body mass index (BMI) of 36.0 to 36.9 in adult ([facility]) 12/03/2019    [place] (obstructive sleep apnea) 11/05/2019    Pulmonary hypertension, unspecified ([facility]) 11/05/2019    Chronic diastolic congestive heart failure ([facility]) 10/04/2019    Uncontrolled type 2 diabetes mellitus with hyperglycemia ([facility])     Shortness of breath 10/02/2019    Tardive dyskinesia 05/16/2019    Angina, class II ([facility])     Controlled type 2 diabetes mellitus with diabetic neuropathy, with long-term current use of insulin ([facility]) 02/24/2017    Diabetes mellitus ([facility]) 08/04/2016    Stented coronary artery-plan is to stay on Plavix indefinately per Dr Walker 06/02/2016    History of seizures     Urinary incontinence due to cognitive impairment     History of type C viral hepatitis     Diabetic nephropathy with proteinuria ([facility])     Incontinence of urine 04/07/2014    Vitamin D insufficiency 02/04/2014    Vitamin B 12 deficiency 06/05/2013    Cerebral microvascular disease 06/05/2013    Hemiparesis, left ([facility]) 01/01/2013    Schizophrenia, paranoid, chronic     Metabolic syndrome     Mixed hyperlipidemia 12/09/2010    Other hammer toe (acquired) 12/13/2007        Past Medical History:   Diagnosis Date    Angina at rest ([facility]) 5/24/2020    Anxiety     CAD S/P percutaneous coronary angioplasty 2015, 2018    stents per Huong Sanabria    CHF (congestive heart failure) ([facility])     CKD (chronic kidney disease) stage 4, GFR 15-29 ml/min ([facility]) 2/24/2018    CKD stage 4 due to type 2 diabetes mellitus ([facility])     Contusion of right chest wall 2/16/2021    COPD (chronic obstructive

## 2024-04-08 LAB
ALBUMIN SERPL-MCNC: 4.4 G/DL (ref 3.5–4.6)
ALP SERPL-CCNC: 71 U/L (ref 40–130)
ALT SERPL-CCNC: <5 U/L (ref 0–33)
ANION GAP SERPL CALCULATED.3IONS-SCNC: 17 MEQ/L (ref 9–15)
AST SERPL-CCNC: 10 U/L (ref 0–35)
BASOPHILS # BLD: 0.1 K/UL (ref 0–0.2)
BASOPHILS NFR BLD: 0.6 %
BILIRUB SERPL-MCNC: 0.8 MG/DL (ref 0.2–0.7)
BUN SERPL-MCNC: 26 MG/DL (ref 8–23)
CALCIUM SERPL-MCNC: 9 MG/DL (ref 8.5–9.9)
CHLORIDE SERPL-SCNC: 91 MEQ/L (ref 95–107)
CO2 SERPL-SCNC: 26 MEQ/L (ref 20–31)
CREAT SERPL-MCNC: 5.02 MG/DL (ref 0.5–0.9)
EOSINOPHIL # BLD: 0.4 K/UL (ref 0–0.7)
EOSINOPHIL NFR BLD: 3.7 %
ERYTHROCYTE [DISTWIDTH] IN BLOOD BY AUTOMATED COUNT: 16.6 % (ref 11.5–14.5)
GLOBULIN SER CALC-MCNC: 2.5 G/DL (ref 2.3–3.5)
GLUCOSE BLD-MCNC: 134 MG/DL (ref 70–99)
GLUCOSE BLD-MCNC: 156 MG/DL (ref 70–99)
GLUCOSE BLD-MCNC: 205 MG/DL (ref 70–99)
GLUCOSE BLD-MCNC: 209 MG/DL (ref 70–99)
GLUCOSE SERPL-MCNC: 240 MG/DL (ref 70–99)
HCT VFR BLD AUTO: 34.9 % (ref 37–47)
HGB BLD-MCNC: 11.7 G/DL (ref 12–16)
LYMPHOCYTES # BLD: 1 K/UL (ref 1–4.8)
LYMPHOCYTES NFR BLD: 10.8 %
MAGNESIUM SERPL-MCNC: 2.1 MG/DL (ref 1.7–2.4)
MCH RBC QN AUTO: 33.4 PG (ref 27–31.3)
MCHC RBC AUTO-ENTMCNC: 33.5 % (ref 33–37)
MCV RBC AUTO: 99.7 FL (ref 79.4–94.8)
MONOCYTES # BLD: 1 K/UL (ref 0.2–0.8)
MONOCYTES NFR BLD: 10.4 %
NEUTROPHILS # BLD: 7 K/UL (ref 1.4–6.5)
NEUTS SEG NFR BLD: 73.9 %
PERFORMED ON: ABNORMAL
PLATELET # BLD AUTO: 129 K/UL (ref 130–400)
POTASSIUM SERPL-SCNC: 3.8 MEQ/L (ref 3.4–4.9)
PROT SERPL-MCNC: 6.9 G/DL (ref 6.3–8)
RBC # BLD AUTO: 3.5 M/UL (ref 4.2–5.4)
SODIUM SERPL-SCNC: 134 MEQ/L (ref 135–144)
WBC # BLD AUTO: 9.4 K/UL (ref 4.8–10.8)

## 2024-04-08 PROCEDURE — 6370000000 HC RX 637 (ALT 250 FOR IP): Performed by: COLON & RECTAL SURGERY

## 2024-04-08 PROCEDURE — 2000000000 HC ICU R&B

## 2024-04-08 PROCEDURE — 80053 COMPREHEN METABOLIC PANEL: CPT

## 2024-04-08 PROCEDURE — 99233 SBSQ HOSP IP/OBS HIGH 50: CPT | Performed by: INTERNAL MEDICINE

## 2024-04-08 PROCEDURE — 6370000000 HC RX 637 (ALT 250 FOR IP): Performed by: INTERNAL MEDICINE

## 2024-04-08 PROCEDURE — 2580000003 HC RX 258: Performed by: COLON & RECTAL SURGERY

## 2024-04-08 PROCEDURE — 99291 CRITICAL CARE FIRST HOUR: CPT | Performed by: INTERNAL MEDICINE

## 2024-04-08 PROCEDURE — 94761 N-INVAS EAR/PLS OXIMETRY MLT: CPT

## 2024-04-08 PROCEDURE — 83735 ASSAY OF MAGNESIUM: CPT

## 2024-04-08 PROCEDURE — 6360000002 HC RX W HCPCS: Performed by: COLON & RECTAL SURGERY

## 2024-04-08 PROCEDURE — 97535 SELF CARE MNGMENT TRAINING: CPT

## 2024-04-08 PROCEDURE — 2580000003 HC RX 258: Performed by: INTERNAL MEDICINE

## 2024-04-08 PROCEDURE — 94640 AIRWAY INHALATION TREATMENT: CPT

## 2024-04-08 PROCEDURE — 6360000002 HC RX W HCPCS: Performed by: INTERNAL MEDICINE

## 2024-04-08 PROCEDURE — 97112 NEUROMUSCULAR REEDUCATION: CPT

## 2024-04-08 PROCEDURE — 85025 COMPLETE CBC W/AUTO DIFF WBC: CPT

## 2024-04-08 PROCEDURE — 99232 SBSQ HOSP IP/OBS MODERATE 35: CPT | Performed by: INTERNAL MEDICINE

## 2024-04-08 PROCEDURE — P9047 ALBUMIN (HUMAN), 25%, 50ML: HCPCS | Performed by: INTERNAL MEDICINE

## 2024-04-08 RX ORDER — POLYETHYLENE GLYCOL 3350 17 G/17G
17 POWDER, FOR SOLUTION ORAL DAILY
Status: DISCONTINUED | OUTPATIENT
Start: 2024-04-08 | End: 2024-04-12 | Stop reason: HOSPADM

## 2024-04-08 RX ORDER — DOCUSATE SODIUM 100 MG/1
100 CAPSULE, LIQUID FILLED ORAL 2 TIMES DAILY
Status: DISCONTINUED | OUTPATIENT
Start: 2024-04-08 | End: 2024-04-12 | Stop reason: HOSPADM

## 2024-04-08 RX ORDER — ZOLPIDEM TARTRATE 5 MG/1
2.5 TABLET ORAL NIGHTLY PRN
Status: DISCONTINUED | OUTPATIENT
Start: 2024-04-08 | End: 2024-04-12 | Stop reason: HOSPADM

## 2024-04-08 RX ADMIN — ASPIRIN 81 MG: 81 TABLET, COATED ORAL at 08:33

## 2024-04-08 RX ADMIN — CEFAZOLIN 2000 MG: 2 INJECTION, POWDER, FOR SOLUTION INTRAMUSCULAR; INTRAVENOUS at 03:35

## 2024-04-08 RX ADMIN — FLUDROCORTISONE ACETATE 0.1 MG: 0.1 TABLET ORAL at 08:33

## 2024-04-08 RX ADMIN — ONDANSETRON 4 MG: 2 INJECTION INTRAMUSCULAR; INTRAVENOUS at 21:32

## 2024-04-08 RX ADMIN — SEVELAMER CARBONATE 800 MG: 800 TABLET, FILM COATED ORAL at 14:33

## 2024-04-08 RX ADMIN — MIDODRINE HYDROCHLORIDE 20 MG: 10 TABLET ORAL at 11:39

## 2024-04-08 RX ADMIN — ALBUMIN (HUMAN) 25 G: 0.25 INJECTION, SOLUTION INTRAVENOUS at 11:46

## 2024-04-08 RX ADMIN — MECLIZINE 25 MG: 12.5 TABLET ORAL at 08:33

## 2024-04-08 RX ADMIN — AMOXICILLIN 500 MG: 500 CAPSULE ORAL at 08:33

## 2024-04-08 RX ADMIN — SODIUM CHLORIDE, PRESERVATIVE FREE 10 ML: 5 INJECTION INTRAVENOUS at 08:34

## 2024-04-08 RX ADMIN — ALBUTEROL SULFATE 2.5 MG: 2.5 SOLUTION RESPIRATORY (INHALATION) at 16:25

## 2024-04-08 RX ADMIN — INSULIN GLARGINE 15 UNITS: 100 INJECTION, SOLUTION SUBCUTANEOUS at 08:32

## 2024-04-08 RX ADMIN — SEVELAMER CARBONATE 800 MG: 800 TABLET, FILM COATED ORAL at 21:17

## 2024-04-08 RX ADMIN — ONDANSETRON 4 MG: 2 INJECTION INTRAMUSCULAR; INTRAVENOUS at 16:40

## 2024-04-08 RX ADMIN — ATORVASTATIN CALCIUM 40 MG: 40 TABLET, FILM COATED ORAL at 21:17

## 2024-04-08 RX ADMIN — DOCUSATE SODIUM 100 MG: 100 CAPSULE, LIQUID FILLED ORAL at 21:16

## 2024-04-08 RX ADMIN — DOCUSATE SODIUM 100 MG: 100 CAPSULE, LIQUID FILLED ORAL at 08:33

## 2024-04-08 RX ADMIN — MIDODRINE HYDROCHLORIDE 20 MG: 10 TABLET ORAL at 16:34

## 2024-04-08 RX ADMIN — CEFAZOLIN 2000 MG: 2 INJECTION, POWDER, FOR SOLUTION INTRAMUSCULAR; INTRAVENOUS at 14:37

## 2024-04-08 RX ADMIN — INSULIN GLARGINE 15 UNITS: 100 INJECTION, SOLUTION SUBCUTANEOUS at 21:15

## 2024-04-08 RX ADMIN — MUPIROCIN: 20 OINTMENT TOPICAL at 09:59

## 2024-04-08 RX ADMIN — SERTRALINE HYDROCHLORIDE 50 MG: 50 TABLET ORAL at 21:17

## 2024-04-08 RX ADMIN — INSULIN LISPRO 4 UNITS: 100 INJECTION, SOLUTION INTRAVENOUS; SUBCUTANEOUS at 08:32

## 2024-04-08 RX ADMIN — HYDROXYZINE HYDROCHLORIDE 25 MG: 25 TABLET ORAL at 21:17

## 2024-04-08 RX ADMIN — PANTOPRAZOLE SODIUM 20 MG: 20 TABLET, DELAYED RELEASE ORAL at 08:33

## 2024-04-08 RX ADMIN — APIXABAN 2.5 MG: 2.5 TABLET, FILM COATED ORAL at 08:34

## 2024-04-08 RX ADMIN — MICONAZOLE NITRATE: 2 POWDER TOPICAL at 08:37

## 2024-04-08 RX ADMIN — ONDANSETRON 4 MG: 2 INJECTION INTRAMUSCULAR; INTRAVENOUS at 04:16

## 2024-04-08 RX ADMIN — MIDODRINE HYDROCHLORIDE 20 MG: 10 TABLET ORAL at 08:32

## 2024-04-08 RX ADMIN — INSULIN LISPRO 4 UNITS: 100 INJECTION, SOLUTION INTRAVENOUS; SUBCUTANEOUS at 11:39

## 2024-04-08 RX ADMIN — ALBUMIN (HUMAN) 25 G: 0.25 INJECTION, SOLUTION INTRAVENOUS at 21:23

## 2024-04-08 RX ADMIN — ARIPIPRAZOLE 5 MG: 5 TABLET ORAL at 08:33

## 2024-04-08 RX ADMIN — APIXABAN 2.5 MG: 2.5 TABLET, FILM COATED ORAL at 21:17

## 2024-04-08 RX ADMIN — ALBUTEROL SULFATE 2.5 MG: 2.5 SOLUTION RESPIRATORY (INHALATION) at 04:37

## 2024-04-08 RX ADMIN — SEVELAMER CARBONATE 800 MG: 800 TABLET, FILM COATED ORAL at 08:33

## 2024-04-08 RX ADMIN — POLYETHYLENE GLYCOL 3350 17 G: 17 POWDER, FOR SOLUTION ORAL at 08:32

## 2024-04-08 RX ADMIN — HYDROXYZINE HYDROCHLORIDE 25 MG: 25 TABLET ORAL at 08:33

## 2024-04-08 RX ADMIN — ALBUMIN (HUMAN) 25 G: 0.25 INJECTION, SOLUTION INTRAVENOUS at 03:37

## 2024-04-08 ASSESSMENT — PAIN SCALES - GENERAL
PAINLEVEL_OUTOF10: 0

## 2024-04-08 NOTE — PROGRESS NOTES
(HCC)     Generalized weakness     Shock (HCC)     Fluid overload     Encounter for hospice care discussion     Advanced care planning/counseling discussion     Goals of care, counseling/discussion     Palliative care encounter     Nonhealing nonsurgical wound     Skin picking habit     Hypotension     ESRD on dialysis (HCC)     Acute decompensated heart failure (HCC)     Constipation     Chronic antibiotic suppression     Chronic infection of prosthetic knee (HCC)     Lack of intravenous access     Pressure injury, unstageable, with suspected deep tissue injury (HCC)     Hypogammaglobulinemia (HCC)      Subjective:   Admit Date: 3/29/2024    Interval History: remains on levo      Medications:   Scheduled Meds:   docusate sodium  100 mg Oral BID    polyethylene glycol  17 g Oral Daily    albumin human 25%  25 g IntraVENous Q8H    fludrocortisone  0.1 mg Oral Daily    meclizine  25 mg Oral Daily    amoxicillin  500 mg Oral Daily    insulin glargine  15 Units SubCUTAneous BID    midodrine  20 mg Oral TID WC    insulin lispro  0-16 Units SubCUTAneous TID WC    insulin lispro  0-4 Units SubCUTAneous Nightly    mupirocin   Topical BID    ceFAZolin  2,000 mg IntraVENous Q12H    hydrOXYzine HCl  25 mg Oral BID    sodium chloride flush  5-40 mL IntraVENous 2 times per day    ARIPiprazole  5 mg Oral Daily    atorvastatin  40 mg Oral Nightly    pantoprazole  20 mg Oral Daily    sevelamer  800 mg Oral TID    apixaban  2.5 mg Oral BID    aspirin EC  81 mg Oral Daily    sertraline  50 mg Oral Nightly    miconazole   Topical BID    albuterol  2.5 mg Nebulization BID RT     Continuous Infusions:   norepinephrine 4 mcg/min (04/08/24 0648)    sodium chloride Stopped (03/30/24 1524)    dextrose         CBC:   Recent Labs     04/07/24  0600 04/08/24  0526   WBC 11.2* 9.4   HGB 12.7 11.7*    129*       CMP:    Recent Labs     04/06/24  0433 04/07/24  0600 04/08/24  0526    137 134*   K 3.7 3.7 3.8   CL 91* 93* 91*   CO2 28

## 2024-04-08 NOTE — PROGRESS NOTES
no clubbing, and no edema, swelling right lower extremity  Neuro:  Grossly normal  Skin: No rashes or nodules noted.  Lymph node:  no cervical nodes  Urology: No Reynolds   Psychiatric: appropriate    Medications:  Scheduled Meds:   albumin human 25%  25 g IntraVENous Q8H    fludrocortisone  0.1 mg Oral Daily    meclizine  25 mg Oral Daily    amoxicillin  500 mg Oral Daily    insulin glargine  15 Units SubCUTAneous BID    midodrine  20 mg Oral TID WC    insulin lispro  0-16 Units SubCUTAneous TID WC    insulin lispro  0-4 Units SubCUTAneous Nightly    mupirocin   Topical BID    ceFAZolin  2,000 mg IntraVENous Q12H    hydrOXYzine HCl  25 mg Oral BID    sodium chloride flush  5-40 mL IntraVENous 2 times per day    ARIPiprazole  5 mg Oral Daily    atorvastatin  40 mg Oral Nightly    pantoprazole  20 mg Oral Daily    sevelamer  800 mg Oral TID    apixaban  2.5 mg Oral BID    aspirin EC  81 mg Oral Daily    sertraline  50 mg Oral Nightly    miconazole   Topical BID    albuterol  2.5 mg Nebulization BID RT       PRN Meds:  HYDROcodone 5 mg - acetaminophen **OR** HYDROcodone 5 mg - acetaminophen, perflutren lipid microspheres, sennosides-docusate sodium, sodium chloride flush, sodium chloride, ondansetron **OR** ondansetron, polyethylene glycol, acetaminophen **OR** acetaminophen, albumin human 25%, glucose, dextrose bolus **OR** dextrose bolus, glucagon (rDNA), dextrose, albuterol    Results: reviewed by me   CBC:   Recent Labs     04/06/24 0433 04/07/24 0600 04/08/24  0526   WBC 10.1 11.2* 9.4   HGB 12.0 12.7 11.7*   HCT 35.8* 38.0 34.9*   .6* 99.7* 99.7*   * 141 129*       BMP:   Recent Labs     04/06/24 0433 04/07/24 0600 04/08/24  0526    137 134*   K 3.7 3.7 3.8   CL 91* 93* 91*   CO2 28 28 26   BUN 17 17 26*   CREATININE 3.68* 3.95* 5.02*       LIVER PROFILE:   Recent Labs     04/06/24 0433 04/07/24 0600 04/08/24  0526   AST 13 12 10   ALT <5 <5 <5   BILITOT 1.0* 0.9* 0.8*   ALKPHOS 83 87 71        PT/INR: No results for input(s): \"PROTIME\", \"INR\" in the last 72 hours.  APTT: No results for input(s): \"APTT\" in the last 72 hours.  UA:No results for input(s): \"NITRITE\", \"COLORU\", \"PHUR\", \"LABCAST\", \"WBCUA\", \"RBCUA\", \"MUCUS\", \"TRICHOMONAS\", \"YEAST\", \"BACTERIA\", \"CLARITYU\", \"SPECGRAV\", \"LEUKOCYTESUR\", \"UROBILINOGEN\", \"BILIRUBINUR\", \"BLOODU\", \"GLUCOSEU\", \"AMORPHOUS\" in the last 72 hours.    Invalid input(s): \"KETONESU\"    Cultures:  Negative so far  Films:  CXR films reviewed by me and it showed bilateral pleural effusion    Arterial ultrasound shows no subclavian artery stenosis    Assessment:  This is a critically ill patient at risk of deterioration / death , needing close ICU monitoring and intervention due to below noted problems   Shock etiology is not clear,?  Septic,    End-stage renal disease on dialysis  Volume overload  Swelling right lower extremity, no DVT  A-fib on   Eliquis   Insomnia  Constipation    Recommendation    Levophed target mean arterial pressure 65-70  Continue albumin 25 g 3 times daily  Continue midodrine  Continue Florinef  Monitor blood pressure and lower extremities  Continue Eliquis   Target blood sugar 140-180   Laxatives  Ambien as needed          Due to the immediate potential for life-threatening deterioration due to shock I spent  35  minutes providing critical care.  This time is excluding time spent performing procedures.          Electronically signed by Say Marsh MD,  [facility] ,on 4/8/2024 at 7:31 AM

## 2024-04-08 NOTE — PROGRESS NOTES
Physical Therapy Med Surg Daily Treatment Note  Facility/Department: [facility] ICU  Room: [place] [address]       NAME: Michelle Le  : 1960 (64 y.o.)  MRN: [id number]  CODE STATUS: Full Code    Date of Service: 2024    Patient Diagnosis(es): ESRD on dialysis (HCC) [N18.6, Z99.2]  Fluid overload [E87.70]  Acute decompensated heart failure (HCC) [I50.9]  Hypotension, unspecified hypotension type [I95.9]   Chief Complaint   Patient presents with    Shortness of Breath     Sob poss. Due to fluid overload     Patient Active Problem List    Diagnosis Date Noted    Unstable angina ([facility]) 2022    Chest pain     Infection of venous access port 2022    Lumbar stenosis with neurogenic claudication 07/15/2022    Falls infrequently 07/15/2022    Cervical stenosis of spinal canal     Ataxic gait     Chronic hepatitis C ([facility]) 07/10/2022    Catheter-related bloodstream infection     Enterococcus faecalis infection     Sepsis due to skin infection ([facility]) 2022    Sarcopenia 2022    Fall from standing     DJD (degenerative joint disease), cervical 2022    Closed head injury     MRSA bacteremia 2022    Sepsis due to Enterococcus ([facility])     Local infection due to central venous catheter     Impaired mobility and activities of daily living dt polyneuropathy and reccent fall  2022    Closed T11 fracture ([facility]) 2022    Encephalopathy acute 2022    Unspecified open wound of right upper arm, initial encounter 2022    Hyperkalemia 2022    CKD (chronic kidney disease) stage 4, GFR 15-29 ml/min ([facility]) 2022    Hypogammaglobulinemia ([facility]) 2024    Lack of intravenous access 2024    Pressure injury, unstageable, with suspected deep tissue injury ([facility]) 2024    Nonhealing nonsurgical wound 2024    Skin picking habit 2024    Hypotension 2024    ESRD on dialysis ([facility]) 2024    Acute decompensated heart failure ([facility]) 2024

## 2024-04-08 NOTE — PROGRESS NOTES
Substance and Sexual Activity    Alcohol use: No     Alcohol/week: 0.0 standard drinks of alcohol    Drug use: No    Sexual activity: Not Currently   Other Topics Concern    Not on file   Social History Narrative    Disabled dt depression and low back pain, lives in [place]    Dtr Angelina is close by and is her paid caregiver via waiver services    HD via [facility], Tues, Thur, Sat.    Son is in the home and has CP and is total care-and is also cared for by a waiver by Paulina.    Pt was born in [place], one of 5-including a twin sister Mechelle Hunter, very ill in 2018, dec 2019    Moved to [place], , 2 children, one son (disabled with CP) and one daughter Paulina    Worked at [employer], as a nurse's aide Disabled due to mental illness and back pain    L[facility] at [facility], was discharged, returned to independent living in 2017 in the daughter's house and has adjusted well    One son and one daughter, live in the same house with patient, Michelle pays the rent    [employer] reading ([employer])             10/11/2021 [facility] updates; patient lives with her daughter son-in-law and 2 grandchildren and patient's handicapped son.  Per daughter, her brother is blind, MRDD, CP multiple health issues.  Daughter's  is patient's legal guardian.    Patient has hemodialysis Tuesday Thursday and Saturday.  Patient's bedroom is on main floor with a half bath.  Daughter walks patient upstairs once weekly for full bath.  Patient is using her walker in the home.  Patient has a hospital bed in the home.     Social Determinants of Health     Financial Resource Strain: Low Risk  (7/29/2022)    Overall Financial Resource Strain (CARDIA)     Difficulty of Paying Living Expenses: Not hard at all   Food Insecurity: No Food Insecurity (3/29/2024)    Hunger Vital Sign     Worried About Running Out of Food in the Last Year: Never true     Ran Out of Food in the Last Year: Never true   Transportation  between 4 and 5 magnesium above 2  Please keep hemoglobin above 8 and place above 50  Ischemic evaluation was recommended as outpatient    Comments:       Thank you for allowing us to participate in the care of this patient.     Will continue to follow.    Electronically signed by David Beltrán MD on 4/8/2024 at 8:00 AM

## 2024-04-08 NOTE — PROGRESS NOTES
Infectious Diseases Inpatient Progress Note          HISTORY OF PRESENT ILLNESS:  Follow up sepsis/shock in a patient with hypogammaglobulinemia, end-stage renal disease on hemodialysis, infected upper extremity ulcers, diabetes mellitus type 2 on IV Ancef, well tolerated.  Patient's  is currently back on Levophed at 5 mics.    Patient reported nausea and abdominal pain.  Left arm ulcers.  Patient concerned about left hand redness and ulcers.  Patient has a tendency to scratch pick scabs from her skin.  Decreased appetite  No confusion.  Reports good appetite.   No cough.  Positive shortness of breath.   Positive generalized weakness  No fevers or chills  Currently connected to hemodialysis  Positive extensive bruising of upper chest and neck area  No rash or itching  No mouth soreness  Patient remains to be in ICU  Current Medications:     docusate sodium  100 mg Oral BID    polyethylene glycol  17 g Oral Daily    albumin human 25%  25 g IntraVENous Q8H    fludrocortisone  0.1 mg Oral Daily    meclizine  25 mg Oral Daily    amoxicillin  500 mg Oral Daily    insulin glargine  15 Units SubCUTAneous BID    midodrine  20 mg Oral TID WC    insulin lispro  0-16 Units SubCUTAneous TID WC    insulin lispro  0-4 Units SubCUTAneous Nightly    mupirocin   Topical BID    ceFAZolin  2,000 mg IntraVENous Q12H    hydrOXYzine HCl  25 mg Oral BID    sodium chloride flush  5-40 mL IntraVENous 2 times per day    ARIPiprazole  5 mg Oral Daily    atorvastatin  40 mg Oral Nightly    pantoprazole  20 mg Oral Daily    sevelamer  800 mg Oral TID    apixaban  2.5 mg Oral BID    aspirin EC  81 mg Oral Daily    sertraline  50 mg Oral Nightly    miconazole   Topical BID    albuterol  2.5 mg Nebulization BID RT       Allergies:  Codeine and Oxycontin [oxycodone hcl]      Review of Systems  14 system review is negative other than HPI    Physical Exam  Vitals:    04/08/24 1145 04/08/24 1200 04/08/24 1215 04/08/24 1230   BP: (!) 133/47 (!) 114/37  Pressure injury, unstageable, with suspected deep tissue injury (HCC)    Hypogammaglobulinemia (HCC)       PLAN:  Continue IV Ancef for 1 more week   Continue chronic suppressive therapy with p.o. amoxicillin for coverage for Enterococcus faecalis prosthetic joint infection  Continue local wound care  Patient was instructed to avoid skin popping and picking  Follow-up CBC  Agree with midodrine that was started few days ago  Vasopressor support and weaning per intensivist    Would ask podiatry to take a look at toe, non urgently    KIRSTIN VO MD

## 2024-04-08 NOTE — FLOWSHEET NOTE
0745 Assessment complete, see flow sheet. Patient A/O X4, following all commands. Levo infusing per MAR. Patient set up for breakfast, offers no complaints at present time.  1230 Resting without complaints, no changes in assessment noted.  1640 Patient c/o  Zofran, small emesis, medicated with prn Zofran see MAR.  1730 Patient had an uneventful day today, repositioned from side to side through out the day, otherwise assessment remains unchanged. Resting watching TV at present time without complaints.

## 2024-04-08 NOTE — CARE COORDINATION
Rounded with RN at bedside with pt. Pt remain on 3 mcg of LEVO.   Initial plan is to return to [facility] when medically clear.   [facility] is unable to take pt on LEVO.   LSW discussed about possible LTAC with pt. Pt called daughter, Angelina. Angelina present via phone. Discussed goals of care.   Pt report, \" I don't want to go to Hospice.\"   Pt gave permission for LTAC referral to see if able to accept pt.   [facility] offered. Pt would like [facility].   Referral sent to [facility].   Pending acceptance.   Updated Annie at [facility].     LSW will follow.     Electronically signed by HIPOLITO Rendon on 4/8/2024 at 10:46 AM

## 2024-04-08 NOTE — PROGRESS NOTES
Hospitalist Progress Note      PCP: Adilene Terry MD    Date of Admission: 3/29/2024    Chief Complaint:  no acute events, afebrile, hypotensive requiring Norepinephrine infusion, on RA    Medications:  Reviewed    Infusion Medications    norepinephrine 4 mcg/min (04/08/24 0648)    sodium chloride Stopped (03/30/24 1524)    dextrose       Scheduled Medications    docusate sodium  100 mg Oral BID    polyethylene glycol  17 g Oral Daily    albumin human 25%  25 g IntraVENous Q8H    fludrocortisone  0.1 mg Oral Daily    meclizine  25 mg Oral Daily    amoxicillin  500 mg Oral Daily    insulin glargine  15 Units SubCUTAneous BID    midodrine  20 mg Oral TID WC    insulin lispro  0-16 Units SubCUTAneous TID WC    insulin lispro  0-4 Units SubCUTAneous Nightly    mupirocin   Topical BID    ceFAZolin  2,000 mg IntraVENous Q12H    hydrOXYzine HCl  25 mg Oral BID    sodium chloride flush  5-40 mL IntraVENous 2 times per day    ARIPiprazole  5 mg Oral Daily    atorvastatin  40 mg Oral Nightly    pantoprazole  20 mg Oral Daily    sevelamer  800 mg Oral TID    apixaban  2.5 mg Oral BID    aspirin EC  81 mg Oral Daily    sertraline  50 mg Oral Nightly    miconazole   Topical BID    albuterol  2.5 mg Nebulization BID RT     PRN Meds: zolpidem, HYDROcodone 5 mg - acetaminophen **OR** HYDROcodone 5 mg - acetaminophen, perflutren lipid microspheres, sodium chloride flush, sodium chloride, ondansetron **OR** ondansetron, acetaminophen **OR** acetaminophen, albumin human 25%, glucose, dextrose bolus **OR** dextrose bolus, glucagon (rDNA), dextrose, albuterol      Intake/Output Summary (Last 24 hours) at 4/8/2024 0923  Last data filed at 4/8/2024 0648  Gross per 24 hour   Intake 529 ml   Output --   Net 529 ml       Exam:    BP (!) 149/27   Pulse 92   Temp 98.8 °F (37.1 °C) (Oral)   Resp 18   Ht 1.702 m (5' 7\")   Wt 96.6 kg (212 lb 15.4 oz)   LMP  (LMP Unknown)   SpO2 96%   BMI 33.35 kg/m²     General appearance: appears  stated age and cooperative.  Respiratory:  clear to auscultation bilaterally  Cardiovascular: Regular rate and rhythm, S1/S2  Abdomen: Soft, active bowel sounds.  Musculoskeletal: edema bilaterally.       Labs:   Recent Labs     04/06/24 0433 04/07/24 0600 04/08/24  0526   WBC 10.1 11.2* 9.4   HGB 12.0 12.7 11.7*   HCT 35.8* 38.0 34.9*   * 141 129*     Recent Labs     04/06/24 0433 04/07/24 0600 04/08/24  0526    137 134*   K 3.7 3.7 3.8   CL 91* 93* 91*   CO2 28 28 26   BUN 17 17 26*   CREATININE 3.68* 3.95* 5.02*   CALCIUM 8.4* 8.9 9.0     Recent Labs     04/06/24 0433 04/07/24 0600 04/08/24  0526   AST 13 12 10   ALT <5 <5 <5   BILITOT 1.0* 0.9* 0.8*   ALKPHOS 83 87 71     No results for input(s): \"INR\" in the last 72 hours.  No results for input(s): \"CKTOTAL\", \"TROPONINI\" in the last 72 hours.    Urinalysis:      Lab Results   Component Value Date/Time    NITRU Negative 10/06/2022 11:15 PM    WBCUA >100 10/06/2022 11:15 PM    BACTERIA MANY 10/06/2022 11:15 PM    RBCUA 10-20 10/06/2022 11:15 PM    BLOODU SMALL 10/06/2022 11:15 PM    SPECGRAV 1.023 10/06/2022 11:15 PM    GLUCOSEU 250 10/06/2022 11:15 PM       Radiology:  Vascular duplex upper extremity arteries bilateral   Final Result   1. Patent left-sided fistula.   2. Diminished flow inferior to the failed right-sided fistula.   3. Patent subclavian arteries with good flow.         XR CHEST PORTABLE   Final Result   Interval placement of a right-sided Kenyon catheter with the tip at the   cavoatrial junction. No pneumothorax.         FLUORO FOR SURGICAL PROCEDURES   Final Result   Intraprocedural fluoroscopic spot images as above.  See separate procedure   report for more information.         XR ABDOMEN (KUB) (SINGLE AP VIEW)   Final Result   No evidence of bowel obstruction.      Colonic fecal retention.         Vascular duplex lower extremity venous right   Final Result   No evidence of DVT in the right lower extremity.         XR CHEST

## 2024-04-08 NOTE — PROGRESS NOTES
Spiritual Care Services     Summary of Visit:    Patient was alert, and oriented, pt talked was quite and pleasant to talk with, no family present, on going spiritual health care     Encounter Summary  Encounter Overview/Reason : Follow-up, Spiritual/Emotional Needs  Service Provided For:: Patient  Referral/Consult From:: Rounding  Support System: Children  Last Encounter : 04/05/24  Complexity of Encounter: Low  Begin Time: 1000  End Time : 1015  Total Time Calculated: 15 min        Spiritual/Emotional needs  Type: Spiritual Support     Grief, Loss, and Adjustments  Type: Life Adjustments                Spiritual Assessment/Intervention/Outcomes:    Assessment: Compromised coping    Intervention: Sustaining Presence/Ministry of presence, Active listening    Outcome: Receptive, Comfort      Care Plan:    Plan and Referrals  Plan/Referrals: Continue to visit, (comment)          Spiritual Care Services   Electronically signed by Chaplain Galen on 4/8/2024 at 3:48 PM.    To reach a  for emotional and spiritual support, place an EPIC consult request.   If a  is needed immediately, dial “0” and ask to page the on-call .

## 2024-04-09 ENCOUNTER — APPOINTMENT (OUTPATIENT)
Age: 64
End: 2024-04-09
Payer: MEDICARE

## 2024-04-09 ENCOUNTER — APPOINTMENT (OUTPATIENT)
Dept: GENERAL RADIOLOGY | Age: 64
End: 2024-04-09
Payer: MEDICARE

## 2024-04-09 ENCOUNTER — APPOINTMENT (OUTPATIENT)
Dept: CT IMAGING | Age: 64
End: 2024-04-09
Payer: MEDICARE

## 2024-04-09 PROBLEM — E27.40 ADRENAL INSUFFICIENCY (HCC): Status: ACTIVE | Noted: 2024-04-09

## 2024-04-09 LAB
ALBUMIN SERPL-MCNC: 4.8 G/DL (ref 3.5–4.6)
ALP SERPL-CCNC: 64 U/L (ref 40–130)
ALT SERPL-CCNC: <5 U/L (ref 0–33)
ANION GAP SERPL CALCULATED.3IONS-SCNC: 20 MEQ/L (ref 9–15)
AST SERPL-CCNC: 10 U/L (ref 0–35)
BASE EXCESS ARTERIAL: -7 (ref -3–3)
BASOPHILS # BLD: 0 K/UL (ref 0–0.2)
BASOPHILS NFR BLD: 0.5 %
BILIRUB SERPL-MCNC: 0.9 MG/DL (ref 0.2–0.7)
BUN SERPL-MCNC: 33 MG/DL (ref 8–23)
CALCIUM IONIZED: 2.08 MMOL/L (ref 1.12–1.32)
CALCIUM SERPL-MCNC: 9.5 MG/DL (ref 8.5–9.9)
CHLORIDE SERPL-SCNC: 92 MEQ/L (ref 95–107)
CO2 SERPL-SCNC: 24 MEQ/L (ref 20–31)
CREAT SERPL-MCNC: 6.3 MG/DL (ref 0.5–0.9)
CRP SERPL HS-MCNC: 36.5 MG/L (ref 0–5)
ECHO AV AREA PEAK VELOCITY: 3 CM2
ECHO AV AREA VTI: 3 CM2
ECHO AV AREA/BSA PEAK VELOCITY: 1.4 CM2/M2
ECHO AV AREA/BSA VTI: 1.4 CM2/M2
ECHO AV CUSP MM: 2 CM
ECHO AV MEAN GRADIENT: 2 MMHG
ECHO AV MEAN VELOCITY: 0.6 M/S
ECHO AV PEAK GRADIENT: 6 MMHG
ECHO AV PEAK VELOCITY: 1.2 M/S
ECHO AV VELOCITY RATIO: 0.92
ECHO AV VTI: 21.8 CM
ECHO BSA: 2.14 M2
ECHO EST RA PRESSURE: 3 MMHG
ECHO LA DIAMETER INDEX: 2.6 CM/M2
ECHO LA DIAMETER: 5.4 CM
ECHO LA VOL A-L A2C: 68 ML (ref 22–52)
ECHO LA VOL A-L A4C: 75 ML (ref 22–52)
ECHO LA VOL MOD A2C: 65 ML (ref 22–52)
ECHO LA VOL MOD A4C: 72 ML (ref 22–52)
ECHO LA VOLUME AREA LENGTH: 72 ML
ECHO LA VOLUME INDEX A-L A2C: 33 ML/M2 (ref 16–34)
ECHO LA VOLUME INDEX A-L A4C: 36 ML/M2 (ref 16–34)
ECHO LA VOLUME INDEX AREA LENGTH: 35 ML/M2 (ref 16–34)
ECHO LA VOLUME INDEX MOD A2C: 31 ML/M2 (ref 16–34)
ECHO LA VOLUME INDEX MOD A4C: 35 ML/M2 (ref 16–34)
ECHO LV E' LATERAL VELOCITY: 10 CM/S
ECHO LV E' SEPTAL VELOCITY: 5 CM/S
ECHO LV EDV A2C: 53 ML
ECHO LV EDV A4C: 75 ML
ECHO LV EDV BP: 64 ML (ref 56–104)
ECHO LV EDV INDEX A4C: 36 ML/M2
ECHO LV EDV INDEX BP: 31 ML/M2
ECHO LV EDV NDEX A2C: 25 ML/M2
ECHO LV EJECTION FRACTION A2C: 70 %
ECHO LV EJECTION FRACTION A4C: 76 %
ECHO LV EJECTION FRACTION BIPLANE: 74 % (ref 55–100)
ECHO LV ESV A2C: 16 ML
ECHO LV ESV A4C: 18 ML
ECHO LV ESV BP: 17 ML (ref 19–49)
ECHO LV ESV INDEX A2C: 8 ML/M2
ECHO LV ESV INDEX A4C: 9 ML/M2
ECHO LV ESV INDEX BP: 8 ML/M2
ECHO LV FRACTIONAL SHORTENING: 37 % (ref 28–44)
ECHO LV INTERNAL DIMENSION DIASTOLE INDEX: 1.83 CM/M2
ECHO LV INTERNAL DIMENSION DIASTOLIC: 3.8 CM (ref 3.9–5.3)
ECHO LV INTERNAL DIMENSION SYSTOLIC INDEX: 1.15 CM/M2
ECHO LV INTERNAL DIMENSION SYSTOLIC: 2.4 CM
ECHO LV IVSD: 1.5 CM (ref 0.6–0.9)
ECHO LV IVSS: 1.7 CM
ECHO LV MASS 2D: 163 G (ref 67–162)
ECHO LV MASS INDEX 2D: 78.4 G/M2 (ref 43–95)
ECHO LV POSTERIOR WALL DIASTOLIC: 1 CM (ref 0.6–0.9)
ECHO LV POSTERIOR WALL SYSTOLIC: 1.2 CM
ECHO LV RELATIVE WALL THICKNESS RATIO: 0.53
ECHO LVOT AREA: 3.1 CM2
ECHO LVOT AV VTI INDEX: 0.91
ECHO LVOT DIAM: 2 CM
ECHO LVOT MEAN GRADIENT: 2 MMHG
ECHO LVOT PEAK GRADIENT: 5 MMHG
ECHO LVOT PEAK VELOCITY: 1.1 M/S
ECHO LVOT STROKE VOLUME INDEX: 29.9 ML/M2
ECHO LVOT SV: 62.2 ML
ECHO LVOT VTI: 19.8 CM
ECHO MV A VELOCITY: 0.82 M/S
ECHO MV AREA VTI: 1.3 CM2
ECHO MV E DECELERATION TIME (DT): 243.5 MS
ECHO MV E VELOCITY: 1.7 M/S
ECHO MV E/A RATIO: 2.07
ECHO MV E/E' LATERAL: 17
ECHO MV E/E' RATIO (AVERAGED): 25.5
ECHO MV LVOT VTI INDEX: 2.34
ECHO MV MAX VELOCITY: 1.9 M/S
ECHO MV MEAN GRADIENT: 5 MMHG
ECHO MV MEAN VELOCITY: 1 M/S
ECHO MV PEAK GRADIENT: 14 MMHG
ECHO MV VTI: 46.3 CM
ECHO PV MAX VELOCITY: 0.9 M/S
ECHO PV PEAK GRADIENT: 3 MMHG
ECHO RIGHT VENTRICULAR SYSTOLIC PRESSURE (RVSP): 62 MMHG
ECHO RV INTERNAL DIMENSION: 3.3 CM
ECHO RV TAPSE: 1.7 CM (ref 1.7–?)
ECHO TV REGURGITANT MAX VELOCITY: 3.85 M/S
ECHO TV REGURGITANT PEAK GRADIENT: 59 MMHG
EOSINOPHIL # BLD: 0.3 K/UL (ref 0–0.7)
EOSINOPHIL NFR BLD: 3.2 %
ERYTHROCYTE [DISTWIDTH] IN BLOOD BY AUTOMATED COUNT: 16.5 % (ref 11.5–14.5)
GLOBULIN SER CALC-MCNC: 2.4 G/DL (ref 2.3–3.5)
GLUCOSE BLD-MCNC: 101 MG/DL (ref 70–99)
GLUCOSE BLD-MCNC: 102 MG/DL (ref 70–99)
GLUCOSE BLD-MCNC: 102 MG/DL (ref 70–99)
GLUCOSE BLD-MCNC: 108 MG/DL (ref 70–99)
GLUCOSE BLD-MCNC: 125 MG/DL (ref 70–99)
GLUCOSE BLD-MCNC: 73 MG/DL (ref 70–99)
GLUCOSE BLD-MCNC: 76 MG/DL (ref 70–99)
GLUCOSE SERPL-MCNC: 82 MG/DL (ref 70–99)
HCO3 ARTERIAL: 18.9 MMOL/L (ref 21–29)
HCT VFR BLD AUTO: 33.3 % (ref 37–47)
HCT VFR BLD AUTO: 42 % (ref 36–48)
HGB BLD CALC-MCNC: 14.2 GM/DL (ref 12–16)
HGB BLD-MCNC: 11.1 G/DL (ref 12–16)
LACTATE: 7.66 MMOL/L (ref 0.4–2)
LACTIC ACID, SEPSIS: 1.4 MMOL/L (ref 0.5–1.9)
LYMPHOCYTES # BLD: 0.9 K/UL (ref 1–4.8)
LYMPHOCYTES NFR BLD: 11.1 %
MAGNESIUM SERPL-MCNC: 2.1 MG/DL (ref 1.7–2.4)
MCH RBC QN AUTO: 33.4 PG (ref 27–31.3)
MCHC RBC AUTO-ENTMCNC: 33.3 % (ref 33–37)
MCV RBC AUTO: 100.3 FL (ref 79.4–94.8)
MONOCYTES # BLD: 0.9 K/UL (ref 0.2–0.8)
MONOCYTES NFR BLD: 10.4 %
NEUTROPHILS # BLD: 6.2 K/UL (ref 1.4–6.5)
NEUTS SEG NFR BLD: 74.2 %
O2 SAT, ARTERIAL: 100 % (ref 93–100)
PCO2 ARTERIAL: 37 MM HG (ref 35–45)
PERFORMED ON: ABNORMAL
PERFORMED ON: NORMAL
PERFORMED ON: NORMAL
PH ARTERIAL: 7.32 (ref 7.35–7.45)
PLATELET # BLD AUTO: 127 K/UL (ref 130–400)
PO2 ARTERIAL: 272 MM HG (ref 75–108)
POC CHLORIDE: 101 MEQ/L (ref 99–110)
POC CREATININE: 6.5 MG/DL (ref 0.6–1.2)
POC FIO2: 100
POC SAMPLE TYPE: ABNORMAL
POTASSIUM SERPL-SCNC: 3.6 MEQ/L (ref 3.4–4.9)
POTASSIUM SERPL-SCNC: 4.2 MEQ/L (ref 3.5–5.1)
PROCALCITONIN SERPL IA-MCNC: 0.48 NG/ML (ref 0–0.15)
PROT SERPL-MCNC: 7.2 G/DL (ref 6.3–8)
RBC # BLD AUTO: 3.32 M/UL (ref 4.2–5.4)
SODIUM BLD-SCNC: 133 MEQ/L (ref 136–145)
SODIUM SERPL-SCNC: 136 MEQ/L (ref 135–144)
TCO2 ARTERIAL: 20 MMOL/L (ref 21–32)
WBC # BLD AUTO: 8.4 K/UL (ref 4.8–10.8)

## 2024-04-09 PROCEDURE — 6360000004 HC RX CONTRAST MEDICATION: Performed by: COLON & RECTAL SURGERY

## 2024-04-09 PROCEDURE — 6360000002 HC RX W HCPCS: Performed by: COLON & RECTAL SURGERY

## 2024-04-09 PROCEDURE — 93306 TTE W/DOPPLER COMPLETE: CPT | Performed by: INTERNAL MEDICINE

## 2024-04-09 PROCEDURE — 6370000000 HC RX 637 (ALT 250 FOR IP): Performed by: INTERNAL MEDICINE

## 2024-04-09 PROCEDURE — 82803 BLOOD GASES ANY COMBINATION: CPT

## 2024-04-09 PROCEDURE — 8010000000 HC HEMODIALYSIS ACUTE INPT

## 2024-04-09 PROCEDURE — 82435 ASSAY OF BLOOD CHLORIDE: CPT

## 2024-04-09 PROCEDURE — P9047 ALBUMIN (HUMAN), 25%, 50ML: HCPCS | Performed by: INTERNAL MEDICINE

## 2024-04-09 PROCEDURE — 84132 ASSAY OF SERUM POTASSIUM: CPT

## 2024-04-09 PROCEDURE — 70450 CT HEAD/BRAIN W/O DYE: CPT

## 2024-04-09 PROCEDURE — 83036 HEMOGLOBIN GLYCOSYLATED A1C: CPT

## 2024-04-09 PROCEDURE — 2580000003 HC RX 258: Performed by: INTERNAL MEDICINE

## 2024-04-09 PROCEDURE — 82533 TOTAL CORTISOL: CPT

## 2024-04-09 PROCEDURE — 36600 WITHDRAWAL OF ARTERIAL BLOOD: CPT

## 2024-04-09 PROCEDURE — 99232 SBSQ HOSP IP/OBS MODERATE 35: CPT | Performed by: INTERNAL MEDICINE

## 2024-04-09 PROCEDURE — 84295 ASSAY OF SERUM SODIUM: CPT

## 2024-04-09 PROCEDURE — 83605 ASSAY OF LACTIC ACID: CPT

## 2024-04-09 PROCEDURE — 6360000002 HC RX W HCPCS: Performed by: INTERNAL MEDICINE

## 2024-04-09 PROCEDURE — 2580000003 HC RX 258: Performed by: STUDENT IN AN ORGANIZED HEALTH CARE EDUCATION/TRAINING PROGRAM

## 2024-04-09 PROCEDURE — 5A12012 PERFORMANCE OF CARDIAC OUTPUT, SINGLE, MANUAL: ICD-10-PCS | Performed by: INTERNAL MEDICINE

## 2024-04-09 PROCEDURE — 82330 ASSAY OF CALCIUM: CPT

## 2024-04-09 PROCEDURE — 71275 CT ANGIOGRAPHY CHEST: CPT

## 2024-04-09 PROCEDURE — 99223 1ST HOSP IP/OBS HIGH 75: CPT | Performed by: PSYCHIATRY & NEUROLOGY

## 2024-04-09 PROCEDURE — 80053 COMPREHEN METABOLIC PANEL: CPT

## 2024-04-09 PROCEDURE — 6360000004 HC RX CONTRAST MEDICATION: Performed by: STUDENT IN AN ORGANIZED HEALTH CARE EDUCATION/TRAINING PROGRAM

## 2024-04-09 PROCEDURE — 6370000000 HC RX 637 (ALT 250 FOR IP): Performed by: COLON & RECTAL SURGERY

## 2024-04-09 PROCEDURE — 6360000002 HC RX W HCPCS: Performed by: STUDENT IN AN ORGANIZED HEALTH CARE EDUCATION/TRAINING PROGRAM

## 2024-04-09 PROCEDURE — 2580000003 HC RX 258: Performed by: COLON & RECTAL SURGERY

## 2024-04-09 PROCEDURE — 82565 ASSAY OF CREATININE: CPT

## 2024-04-09 PROCEDURE — 84145 PROCALCITONIN (PCT): CPT

## 2024-04-09 PROCEDURE — 2000000000 HC ICU R&B

## 2024-04-09 PROCEDURE — 92950 HEART/LUNG RESUSCITATION CPR: CPT

## 2024-04-09 PROCEDURE — 85025 COMPLETE CBC W/AUTO DIFF WBC: CPT

## 2024-04-09 PROCEDURE — 82948 REAGENT STRIP/BLOOD GLUCOSE: CPT

## 2024-04-09 PROCEDURE — 71045 X-RAY EXAM CHEST 1 VIEW: CPT

## 2024-04-09 PROCEDURE — 99222 1ST HOSP IP/OBS MODERATE 55: CPT | Performed by: INTERNAL MEDICINE

## 2024-04-09 PROCEDURE — C8929 TTE W OR WO FOL WCON,DOPPLER: HCPCS

## 2024-04-09 PROCEDURE — 94640 AIRWAY INHALATION TREATMENT: CPT

## 2024-04-09 PROCEDURE — 2500000003 HC RX 250 WO HCPCS: Performed by: COLON & RECTAL SURGERY

## 2024-04-09 PROCEDURE — 2500000003 HC RX 250 WO HCPCS: Performed by: STUDENT IN AN ORGANIZED HEALTH CARE EDUCATION/TRAINING PROGRAM

## 2024-04-09 PROCEDURE — 85014 HEMATOCRIT: CPT

## 2024-04-09 PROCEDURE — 94761 N-INVAS EAR/PLS OXIMETRY MLT: CPT

## 2024-04-09 PROCEDURE — 83735 ASSAY OF MAGNESIUM: CPT

## 2024-04-09 PROCEDURE — 95816 EEG AWAKE AND DROWSY: CPT

## 2024-04-09 PROCEDURE — 99291 CRITICAL CARE FIRST HOUR: CPT | Performed by: INTERNAL MEDICINE

## 2024-04-09 PROCEDURE — 86140 C-REACTIVE PROTEIN: CPT

## 2024-04-09 RX ORDER — COSYNTROPIN 0.25 MG/ML
250 INJECTION, POWDER, FOR SOLUTION INTRAMUSCULAR; INTRAVENOUS ONCE
Status: COMPLETED | OUTPATIENT
Start: 2024-04-09 | End: 2024-04-09

## 2024-04-09 RX ORDER — CALCIUM CHLORIDE 100 MG/ML
INJECTION INTRAVENOUS; INTRAVENTRICULAR
Status: COMPLETED | OUTPATIENT
Start: 2024-04-09 | End: 2024-04-09

## 2024-04-09 RX ORDER — SUCCINYLCHOLINE/SOD CL,ISO/PF 100 MG/5ML
100 SYRINGE (ML) INTRAVENOUS ONCE
Status: DISCONTINUED | OUTPATIENT
Start: 2024-04-09 | End: 2024-04-09

## 2024-04-09 RX ORDER — ETOMIDATE 2 MG/ML
0.3 INJECTION INTRAVENOUS ONCE
Status: DISCONTINUED | OUTPATIENT
Start: 2024-04-09 | End: 2024-04-09

## 2024-04-09 RX ORDER — EPINEPHRINE IN SOD CHLOR,ISO 1 MG/10 ML
SYRINGE (ML) INTRAVENOUS
Status: COMPLETED | OUTPATIENT
Start: 2024-04-09 | End: 2024-04-09

## 2024-04-09 RX ADMIN — CEFAZOLIN 2000 MG: 2 INJECTION, POWDER, FOR SOLUTION INTRAMUSCULAR; INTRAVENOUS at 03:40

## 2024-04-09 RX ADMIN — DOCUSATE SODIUM 100 MG: 100 CAPSULE, LIQUID FILLED ORAL at 07:39

## 2024-04-09 RX ADMIN — FLUDROCORTISONE ACETATE 0.1 MG: 0.1 TABLET ORAL at 07:39

## 2024-04-09 RX ADMIN — HYDROXYZINE HYDROCHLORIDE 25 MG: 25 TABLET ORAL at 21:13

## 2024-04-09 RX ADMIN — CEFAZOLIN 2000 MG: 2 INJECTION, POWDER, FOR SOLUTION INTRAMUSCULAR; INTRAVENOUS at 16:14

## 2024-04-09 RX ADMIN — HYDROCODONE BITARTRATE AND ACETAMINOPHEN 1 TABLET: 5; 325 TABLET ORAL at 14:01

## 2024-04-09 RX ADMIN — IOPAMIDOL 75 ML: 755 INJECTION, SOLUTION INTRAVENOUS at 10:14

## 2024-04-09 RX ADMIN — ALBUMIN (HUMAN) 25 G: 0.25 INJECTION, SOLUTION INTRAVENOUS at 04:16

## 2024-04-09 RX ADMIN — MICONAZOLE NITRATE: 2 POWDER TOPICAL at 00:40

## 2024-04-09 RX ADMIN — MICONAZOLE NITRATE: 2 POWDER TOPICAL at 09:44

## 2024-04-09 RX ADMIN — MIDODRINE HYDROCHLORIDE 20 MG: 10 TABLET ORAL at 16:44

## 2024-04-09 RX ADMIN — ONDANSETRON 4 MG: 2 INJECTION INTRAMUSCULAR; INTRAVENOUS at 08:15

## 2024-04-09 RX ADMIN — POLYETHYLENE GLYCOL 3350 17 G: 17 POWDER, FOR SOLUTION ORAL at 07:41

## 2024-04-09 RX ADMIN — ALBUTEROL SULFATE 2.5 MG: 2.5 SOLUTION RESPIRATORY (INHALATION) at 03:56

## 2024-04-09 RX ADMIN — Medication 14 MCG/MIN: at 21:42

## 2024-04-09 RX ADMIN — SODIUM CHLORIDE, PRESERVATIVE FREE 10 ML: 5 INJECTION INTRAVENOUS at 08:52

## 2024-04-09 RX ADMIN — LEVETIRACETAM 1000 MG: 100 INJECTION, SOLUTION INTRAVENOUS at 08:08

## 2024-04-09 RX ADMIN — CALCIUM CHLORIDE 1000 MG: 100 INJECTION INTRAVENOUS; INTRAVENTRICULAR at 07:48

## 2024-04-09 RX ADMIN — SEVELAMER CARBONATE 800 MG: 800 TABLET, FILM COATED ORAL at 16:44

## 2024-04-09 RX ADMIN — PANTOPRAZOLE SODIUM 20 MG: 20 TABLET, DELAYED RELEASE ORAL at 07:39

## 2024-04-09 RX ADMIN — SODIUM CHLORIDE, PRESERVATIVE FREE 10 ML: 5 INJECTION INTRAVENOUS at 20:09

## 2024-04-09 RX ADMIN — SEVELAMER CARBONATE 800 MG: 800 TABLET, FILM COATED ORAL at 21:13

## 2024-04-09 RX ADMIN — MUPIROCIN: 20 OINTMENT TOPICAL at 14:12

## 2024-04-09 RX ADMIN — AMOXICILLIN 500 MG: 500 CAPSULE ORAL at 07:38

## 2024-04-09 RX ADMIN — APIXABAN 2.5 MG: 2.5 TABLET, FILM COATED ORAL at 07:39

## 2024-04-09 RX ADMIN — PERFLUTREN 1.5 ML: 6.52 INJECTION, SUSPENSION INTRAVENOUS at 11:18

## 2024-04-09 RX ADMIN — ALBUMIN (HUMAN) 25 G: 0.25 INJECTION, SOLUTION INTRAVENOUS at 11:48

## 2024-04-09 RX ADMIN — DOCUSATE SODIUM 100 MG: 100 CAPSULE, LIQUID FILLED ORAL at 21:13

## 2024-04-09 RX ADMIN — SERTRALINE HYDROCHLORIDE 50 MG: 50 TABLET ORAL at 21:13

## 2024-04-09 RX ADMIN — HYDROCODONE BITARTRATE AND ACETAMINOPHEN 2 TABLET: 5; 325 TABLET ORAL at 21:44

## 2024-04-09 RX ADMIN — MIDODRINE HYDROCHLORIDE 20 MG: 10 TABLET ORAL at 07:38

## 2024-04-09 RX ADMIN — ALBUMIN (HUMAN) 25 G: 0.25 INJECTION, SOLUTION INTRAVENOUS at 20:09

## 2024-04-09 RX ADMIN — APIXABAN 2.5 MG: 2.5 TABLET, FILM COATED ORAL at 21:13

## 2024-04-09 RX ADMIN — ASPIRIN 81 MG: 81 TABLET, COATED ORAL at 07:39

## 2024-04-09 RX ADMIN — MECLIZINE 25 MG: 12.5 TABLET ORAL at 07:39

## 2024-04-09 RX ADMIN — ARIPIPRAZOLE 5 MG: 5 TABLET ORAL at 07:39

## 2024-04-09 RX ADMIN — ATORVASTATIN CALCIUM 40 MG: 40 TABLET, FILM COATED ORAL at 21:13

## 2024-04-09 RX ADMIN — MUPIROCIN: 20 OINTMENT TOPICAL at 00:40

## 2024-04-09 RX ADMIN — EPINEPHRINE 1 MG: 0.1 INJECTION, SOLUTION ENDOTRACHEAL; INTRACARDIAC; INTRAVENOUS at 07:46

## 2024-04-09 RX ADMIN — ALBUTEROL SULFATE 2.5 MG: 2.5 SOLUTION RESPIRATORY (INHALATION) at 16:08

## 2024-04-09 RX ADMIN — ACETAMINOPHEN 650 MG: 325 TABLET ORAL at 10:30

## 2024-04-09 RX ADMIN — MIDODRINE HYDROCHLORIDE 20 MG: 10 TABLET ORAL at 11:35

## 2024-04-09 RX ADMIN — COSYNTROPIN 250 MCG: 0.25 INJECTION, POWDER, LYOPHILIZED, FOR SOLUTION INTRAMUSCULAR; INTRAVENOUS at 16:05

## 2024-04-09 RX ADMIN — HYDROXYZINE HYDROCHLORIDE 25 MG: 25 TABLET ORAL at 07:39

## 2024-04-09 RX ADMIN — SEVELAMER CARBONATE 800 MG: 800 TABLET, FILM COATED ORAL at 07:38

## 2024-04-09 ASSESSMENT — PAIN DESCRIPTION - DESCRIPTORS
DESCRIPTORS: ACHING

## 2024-04-09 ASSESSMENT — ENCOUNTER SYMPTOMS
PHOTOPHOBIA: 0
TROUBLE SWALLOWING: 0
VOMITING: 0
COLOR CHANGE: 0
BACK PAIN: 0
CHOKING: 0
NAUSEA: 0

## 2024-04-09 ASSESSMENT — PAIN SCALES - GENERAL
PAINLEVEL_OUTOF10: 7
PAINLEVEL_OUTOF10: 0
PAINLEVEL_OUTOF10: 0
PAINLEVEL_OUTOF10: 7
PAINLEVEL_OUTOF10: 0
PAINLEVEL_OUTOF10: 8

## 2024-04-09 ASSESSMENT — PAIN DESCRIPTION - LOCATION
LOCATION: CHEST

## 2024-04-09 ASSESSMENT — PAIN DESCRIPTION - ORIENTATION: ORIENTATION: MID

## 2024-04-09 NOTE — DIALYSIS
Hemodialysis completed as ordered. 3000 ml fluid removed w/treatment, tolerated well. Please see printed record for dialysis treatment details

## 2024-04-09 NOTE — PROGRESS NOTES
PHARMACY NOTE:   ICU Rounds Attended (10-15 minutes in patient room):    Pt diagnosis: fluid overload    Follow up/Changes:    -home meds reviewed/ reordered  -hold lantus for possible hypoglycemic seizure this am per verbal on rounds  -HD today       NOTES:    Antimicrobial Therapy: Day 9/14: ancef. No Cultures. ID on consult. Amoxicillin daily for chronic   Pressors: norepinephrine @ 2  Steroid: florinef 0.1mg daily  Insulin Therapy (goal: 140-180): high SSI. 8 units given past 24 hours. Lantus 15 BID-HELD  Stress Ulcer Prophylaxis: Pantoprazole  DVT Prophylaxis/Anticoagulant Therapy: eliquis   Core measures assessed/met.       Leti Freeman, JULITA PharmD

## 2024-04-09 NOTE — PROGRESS NOTES
Oxycontin [Oxycodone Hcl] Hives      Review of Systems:       [x] CV, Resp, Neuro, , and all other systems reviewed and negative other than listed in HPI.         Objective Findings:     Vitals:   Vitals:    04/09/24 1045 04/09/24 1047 04/09/24 1100 04/09/24 1115   BP: (!) 151/25 (!) 152/38 (!) 145/26 (!) 159/63   Pulse: 71  73 67   Resp: 17  18    Temp:       TempSrc:       SpO2: 97%  99% 99%   Weight:  96.6 kg (212 lb 15.4 oz)     Height:  1.702 m (5' 7.01\")          Physical Examination:  General: Alert oriented x4 no acute distress  HEENT: Normocephalic, No scleral icterus.  Neck: No JVD.  Heart: Regular, no murmur, no rub/gallop. No RV heave.  Lungs: Clear to ascultation, no rales/wheezing/rhonchi. Good chest wall excursion.  Abdomen: Soft non tender non distended   extremities: No clubbing/cyanosis, no edema.   Skin: Warm, dry, normal turgor, no rash, no bruise, no petichiae.  Neuro: No myoclonus or tremor.   Psych: Normal affect    Results/ Medications reviewed 4/9/2024, 11:20 AM     Laboratory, Microbiology, Pathology, Radiology, Cardiology, Medications and Transcriptions reviewed  Scheduled Meds:   etomidate  0.3 mg/kg IntraVENous Once    succinylcholine chloride  100 mg IntraVENous Once    docusate sodium  100 mg Oral BID    polyethylene glycol  17 g Oral Daily    albumin human 25%  25 g IntraVENous Q8H    fludrocortisone  0.1 mg Oral Daily    meclizine  25 mg Oral Daily    amoxicillin  500 mg Oral Daily    [Held by provider] insulin glargine  15 Units SubCUTAneous BID    midodrine  20 mg Oral TID WC    insulin lispro  0-16 Units SubCUTAneous TID WC    insulin lispro  0-4 Units SubCUTAneous Nightly    mupirocin   Topical BID    ceFAZolin  2,000 mg IntraVENous Q12H    hydrOXYzine HCl  25 mg Oral BID    sodium chloride flush  5-40 mL IntraVENous 2 times per day    ARIPiprazole  5 mg Oral Daily    atorvastatin  40 mg Oral Nightly    pantoprazole  20 mg Oral Daily    sevelamer  800 mg Oral TID    apixaban   2.5 mg Oral BID    aspirin EC  81 mg Oral Daily    sertraline  50 mg Oral Nightly    miconazole   Topical BID    albuterol  2.5 mg Nebulization BID RT     Continuous Infusions:   norepinephrine 2 mcg/min (04/08/24 1831)    sodium chloride Stopped (03/30/24 1524)    dextrose         Recent Labs     04/07/24 0600 04/08/24 0526 04/09/24 0600 04/09/24  0754   WBC 11.2* 9.4 8.4  --    HGB 12.7 11.7* 11.1* 14.2   HCT 38.0 34.9* 33.3*  --    MCV 99.7* 99.7* 100.3*  --     129* 127*  --      Recent Labs     04/07/24 0600 04/08/24 0526 04/09/24 0600 04/09/24  0754    134* 136  --    K 3.7 3.8 3.6  --    CL 93* 91* 92*  --    CO2 28 26 24  --    BUN 17 26* 33*  --    CREATININE 3.95* 5.02* 6.30* 6.5*     Recent Labs     04/07/24 0600 04/08/24 0526 04/09/24  0600   PROT 7.0 6.9 7.2     03/05/24    ECHO (TTE) COMPLETE (PRN CONTRAST/BUBBLE/STRAIN/3D) 03/06/2024  3:38 PM (Final)    Interpretation Summary    Left Ventricle: Normal left ventricular systolic function with a visually estimated EF of 55 - 60%. Left ventricle size is normal. Normal wall thickness. Normal wall motion. Diastolic dysfunction present with normal LV EF.    Right Ventricle: Right ventricle is moderately dilated. Moderately reduced systolic function.    Mitral Valve: Moderately thickened leaflet. Mild annular calcification of the mitral valve. Mild to moderate regurgitation with an eccentrically directed jet and may underestimate severity.    Tricuspid Valve: Moderate to severe regurgitation. Severely elevated RVSP, consistent with severe pulmonary hypertension.    Left Atrium: Left atrium is mildly dilated.    Right Atrium: Right atrium is moderately dilated.    Image quality is technically difficult. Contrast used: Definity. Limited study due to patient's ability to tolerate test and procedure performed with the patient in a supine position.    Signed by: Morteza WOODS MD on 3/6/2024  3:38 PM       Impression:   Shock.  Possible septic

## 2024-04-09 NOTE — PROGRESS NOTES
Pulmonary & Critical Care Medicine ICU Progress Note  Chief complaint : Shock    Subjunctive/24 hour events :   Patient seen and examined during multidisciplinary rounds with RN, charge nurse, RT, pharmacy, dietitian, and social service.      Patient had sudden episode of unresponsiveness with seizure-like activity, and became pulseless, requiring CPR, 4 almost 90 seconds, received 1 dose of epinephrine 1 mg, with ROSC, currently awake and alert, slightly confused but otherwise follows commands, and interactive.  No other reported issues or change overnight, she is on Levophed however on 2 mcg/min, she was on room air saturation 98%.  No fever.    New information updated in the note today, rest of the examination did not change compared to yesterday.  Social History     Tobacco Use    Smoking status: Never     Passive exposure: Never    Smokeless tobacco: Never   Substance Use Topics    Alcohol use: No     Alcohol/week: 0.0 standard drinks of alcohol         Problem Relation Age of Onset    Cancer Mother 68        survived    Breast Cancer Mother     Hypertension Father     Diabetes Sister     Mental Illness Sister     Colon Cancer Neg Hx        Recent Labs     04/09/24  0754   PHART 7.317*   [id number] 37   PO2ART 272*       MV Settings:     / / /            IV:   norepinephrine 2 mcg/min (04/08/24 1831)    sodium chloride Stopped (03/30/24 1524)    dextrose         Vitals:  BP (!) 144/40   Pulse 83   Temp 97.8 °F (36.6 °C) (Oral)   Resp 15   Ht 1.702 m (5' 7\")   Wt 96.6 kg (212 lb 15.4 oz)   LMP  (LMP Unknown)   SpO2 98%   BMI 33.35 kg/m²    Tmax:        Intake/Output Summary (Last 24 hours) at 4/9/2024 0842  Last data filed at 4/8/2024 1831  Gross per 24 hour   Intake 199.34 ml   Output --   Net 199.34 ml         EXAM:  General: alert, cooperative, no distress, slightly confused  Head: normocephalic, atraumatic  Eyes:No gross abnormalities.  ENT:  MMM no lesions  Neck:  supple and no masses  Chest : clear to  14.2   HCT 38.0 34.9* 33.3*  --    MCV 99.7* 99.7* 100.3*  --     129* 127*  --        BMP:   Recent Labs     04/07/24 0600 04/08/24 0526 04/09/24 0600 04/09/24  0754    134* 136  --    K 3.7 3.8 3.6  --    CL 93* 91* 92*  --    CO2 28 26 24  --    BUN 17 26* 33*  --    CREATININE 3.95* 5.02* 6.30* 6.5*       LIVER PROFILE:   Recent Labs     04/07/24 0600 04/08/24 0526 04/09/24 0600   AST 12 10 10   ALT <5 <5 <5   BILITOT 0.9* 0.8* 0.9*   ALKPHOS 87 71 64       PT/INR: No results for input(s): \"PROTIME\", \"INR\" in the last 72 hours.  APTT: No results for input(s): \"APTT\" in the last 72 hours.  UA:No results for input(s): \"NITRITE\", \"COLORU\", \"PHUR\", \"LABCAST\", \"WBCUA\", \"RBCUA\", \"MUCUS\", \"TRICHOMONAS\", \"YEAST\", \"BACTERIA\", \"CLARITYU\", \"SPECGRAV\", \"LEUKOCYTESUR\", \"UROBILINOGEN\", \"BILIRUBINUR\", \"BLOODU\", \"GLUCOSEU\", \"AMORPHOUS\" in the last 72 hours.    Invalid input(s): \"KETONESU\"    Cultures:  Negative so far  Films:  CXR films reviewed by me and it showed bilateral pleural effusion    Arterial ultrasound shows no subclavian artery stenosis    Assessment:  This is a critically ill patient at risk of deterioration / death , needing close ICU monitoring and intervention due to below noted problems   Postcardiac arrest, brief, etiology is not clear  Lactic acidosis, postcardiac arrest  Shock etiology is not clear,?  Septic,    End-stage renal disease on dialysis  Volume overload  Swelling right lower extremity, no DVT  A-fib on   Eliquis   Insomnia  Constipation    Recommendation    Levophed target mean arterial pressure 65-70  Continue albumin 25 g 3 times daily  Continue midodrine  Continue Florinef  Monitor blood pressure and lower extremities  Continue Eliquis   Target blood sugar 140-180   Laxatives  Ambien as needed  Echo   On Eliquis, DVT is less likely  CT chest  Repeat lactic acid later today        Discussed with patient daughter    Due to the immediate potential for life-threatening

## 2024-04-09 NOTE — PROGRESS NOTES
Renal Progress Note  Assessment and Plan:   64-year-old lady with end-stage renal disease on hemodialysis Tuesday Thursday Saturday.  Chronic hypotension maintained on large dose midodrine.  Admitted with SOB.  Sig above dry weight.  Doesn't make urine.  EF is normal.  Transferred to ICU with hypotension. Her DW is at 97 kg as outpt.  Had been leaving above that. Code blue 4/9 s/p ROSC following 1 round of CPR. Being seen by neurology     Plan:  - continue IHD TTS      Patient Active Problem List:     Coronary artery disease involving native coronary artery of native heart with angina pectoris (HCC)     Schizophrenia, paranoid, chronic     Metabolic syndrome     Vitamin B 12 deficiency     Cerebral microvascular disease     Mixed hyperlipidemia     Other hammer toe (acquired)     Vitamin D insufficiency     Incontinence of urine     Diabetic nephropathy with proteinuria (HCC)     Essential (primary) hypertension     History of type C viral hepatitis     Urinary incontinence due to cognitive impairment     History of seizures     Stented coronary artery-plan is to stay on Plavix indefinately per Dr Walker     Diabetes mellitus ([facility])     Controlled type 2 diabetes mellitus with diabetic neuropathy, with long-term current use of insulin ([facility])     Hemiparesis, left ([facility])     Angina, class II ([facility])     Pain, unspecified     Tardive dyskinesia     Shortness of breath     Uncontrolled type 2 diabetes mellitus with hyperglycemia ([facility])     Chronic diastolic congestive heart failure (HCC)     [place] (obstructive sleep apnea)     Pulmonary hypertension, unspecified ([facility])     Class 2 severe obesity with serious comorbidity and body mass index (BMI) of 36.0 to 36.9 in adult (HCC)     Edema     Closed supracondylar fracture of right humerus     Other chronic pain     Palliative care patient     Recurrent falls     Renal failure     Difficulty in walking     ESRD (end stage renal disease) on dialysis (HCC)     Weakness     Other  seizures ([facility])     Moderate persistent asthma without complication     COVID-19     Post PTCA     Falls frequently     Chest wall contusion, left, initial encounter     Headache, unspecified     Paranoid schizophrenia ([facility])     Compression fracture of spine (HCC)     Closed rib fracture     Depression     Chronic obstructive pulmonary disease (HCC)     Critical illness polyneuropathy ([facility])     Multiple closed fractures of ribs of right side     Nonrheumatic mitral valve regurgitation     Nonrheumatic tricuspid valve regurgitation     Need for extended care facility     Chronic pain of both shoulders     Hemodialysis-associated hypotension     Anginal chest pain at rest ([facility])     Chest pain     Unstable angina ([facility])     NSTEMI (non-ST elevated myocardial infarction) ([facility])     CKD (chronic kidney disease) stage 4, GFR 15-29 ml/min ([facility])     Hyperkalemia     Impaired mobility and activities of daily living dt polyneuropathy and reccent fall      Dialysis patient ([facility])     Unspecified open wound of right upper arm, initial encounter     Multiple closed fractures of right lower extremity and ribs     Closed T11 fracture ([facility])     Encephalopathy acute     MRSA bacteremia     Sepsis due to Enterococcus ([facility])     Local infection due to central venous catheter     DJD (degenerative joint disease), cervical     Closed head injury     Sarcopenia     Fall from standing     Sepsis due to skin infection ([facility])     Chronic hepatitis C (HCC)     Catheter-related bloodstream infection     Enterococcus faecalis infection     Cervical stenosis of spinal canal     Ataxic gait     Lumbar stenosis with neurogenic claudication     Falls infrequently     Infection of venous access port     Diarrhea     Anemia, unspecified     Eczema     AMS (altered mental status)     Heart failure ([facility])     Interstitial lung disease ([facility])     Cellulitis of left hand     Chronic osteomyelitis of knee (HCC)     Generalized weakness     Shock ([facility])     Fluid

## 2024-04-09 NOTE — SIGNIFICANT EVENT
Code blue called this morning. Per RN, patient was awake and talking normally this am and even making jokes. She acutely became unresponsive then had generalized limb movements concerning for seizure before she went pulseless. Compressions started soon after. On my arrival compressions had been going on for roughly 90 seconds. 1 mg epinephrine had already been administered. Given ESRD 2 g CaCl also was administered soon after my arrival. After 1 round of compressions patient was awake and moving her upper extremities to take mask off. Though she was confused she was able to say some words to us soon after ROSC was achieved. Patient placed on NRB with improvement in O2 sats. Glc taken was 73. ABG taken soon after ROSC was achieved yielded pH 7.32, pO2 272, pCO2 37. Patient was tachycardic in 110s and hypertensive while on 2 mcg/min levophed.     Plan:     -1g IV keppra given  -STAT CT head  -Continue NRB, wean as tolerated  -Neurology consult  -Repeat lactate in 4 hours

## 2024-04-09 NOTE — FLOWSHEET NOTE
2030-  Assumed care of this pt at this time. PM assessment complete see flowsheet for details. Pt resting in bed, no acute distress noted, call light within reach.     [id number]  Pt bathed with HCG soap, medicated, repeatedly repositioned, educated, documented and communicated with by this RN. All questions/ concerns addressed to the best of this RN's ability, pt reports satisfaction with care. No acute distress noted, call light remains within reach.       Electronically signed by Tom Dye RN on 4/9/2024 at 6:28 AM

## 2024-04-09 NOTE — PROGRESS NOTES
0730 am assessment completed as noted. Vss, will continue to monitor. Electronically signed by Ashley Benjamin RN on 4/9/2024 at 9:38 AM  0745 Rapid called pt not responding having seizure activity. No pulse, noted, cpr started, code blue called. Dr. Rai responded. Soon after ROSC was achieved. Pt speaking and following commands.   0900 Pt to ct via bed. Pt c/o chest pain. Pt back to room via bed. EKG obtained. EKG unremarkable. Pt  states chest hurts more when pushed on chest. Electronically signed by Ashley Benjamin RN on 4/9/2024 at 9:43 AM

## 2024-04-09 NOTE — CONSULTS
Subjective:      Patient ID: Michelle Le is a 64 y.o. female who presents today for brief possible seizure-like episode..    HPI 64-year-old right-handed female who has been in the hospital for quite some time in the intensive care unit.  This morning patient had CODE BLUE.  Patient is awake talking normally then had an episode where she had generalized limb movements concerning for seizure and went pulseless.  Compressions were done for about 90 seconds and then it was pulseless during that time.  After 1 round of compression patient awoke moving her upper extremities and she was confused for some time.  Patient has no history of seizures and when I seen her patient appeared to be oriented to self place month and year.  Patient is followed by multiple consultants and events noted patient had colorectal surgery.  Patient also has renal failure and followed by ID as well.  She reports no headache  Review of Systems   Constitutional:  Negative for fever.   HENT:  Negative for ear pain, tinnitus and trouble swallowing.    Eyes:  Negative for photophobia and visual disturbance.   Respiratory:  Negative for choking and shortness of breath.    Cardiovascular:  Negative for chest pain and palpitations.   Gastrointestinal:  Negative for nausea and vomiting.   Musculoskeletal:  Negative for back pain, gait problem, joint swelling, myalgias, neck pain and neck stiffness.   Skin:  Negative for color change.   Allergic/Immunologic: Negative for food allergies.   Neurological:  Positive for weakness. Negative for dizziness, tremors, seizures, syncope, facial asymmetry, speech difficulty, light-headedness, numbness and headaches.   Psychiatric/Behavioral:  Negative for behavioral problems, confusion, hallucinations and sleep disturbance.        Past Medical History:   Diagnosis Date    Angina at rest ([facility]) 5/24/2020    Anxiety     CAD S/P percutaneous coronary angioplasty 2015, 2018    stents per [facility]     Use    Smoking status: Never     Passive exposure: Never    Smokeless tobacco: Never   Vaping Use    Vaping Use: Never used   Substance and Sexual Activity    Alcohol use: No     Alcohol/week: 0.0 standard drinks of alcohol    Drug use: No    Sexual activity: Not Currently   Other Topics Concern    Not on file   Social History Narrative    Disabled dt depression and low back pain, lives in [place]    Dtr Angelina is close by and is her paid caregiver via waiver services    HD via [place] transit, Tues, Thur, Sat.    Son is in the home and has CP and is total care-and is also cared for by a waiver by Paulina.    Pt was born in [place], one of 5-including a twin sister Mcehelle Hunter, very ill in 2018, dec 2019    Moved to [place], , 2 children, one son (disabled with CP) and one daughter Paulina    Worked at [employer], as a nurse's aide Disabled due to mental illness and back pain    Lived at [facility], was discharged, returned to independent living in 2017 in the daughter's house and has adjusted well    One son and one daughter, live in the same house with patient, Michelle pays the rent    [employer] reading ([employer])             10/11/2021 [facility] updates; patient lives with her daughter son-in-law and 2 grandchildren and patient's handicapped son.  Per daughter, her brother is blind, MRDD, CP multiple health issues.  Daughter's  is patient's legal guardian.    Patient has hemodialysis Tuesday Thursday and Saturday.  Patient's bedroom is on main floor with a half bath.  Daughter walks patient upstairs once weekly for full bath.  Patient is using her walker in the home.  Patient has a hospital bed in the home.     Social Determinants of Health     Financial Resource Strain: Low Risk  (7/29/2022)    Overall Financial Resource Strain (CARDIA)     Difficulty of Paying Living Expenses: Not hard at all   Food Insecurity: No Food Insecurity (3/29/2024)    Hunger Vital Sign

## 2024-04-09 NOTE — PROGRESS NOTES
Spiritual Care Services     Summary of Visit:   responded to a Code Blue in ICU. No family were present and  called patient's daughter who came to the hospital. Daughter initially shocked and anxious when she arrived.  provided a supportive presence and seeing that her mother was doing better than she initially thought helped calm the daughter.     Encounter Summary  Encounter Overview/Reason : Crisis  Service Provided For:: Patient and family together  Referral/Consult From:: Multi-disciplinary team  Support System: Children, Family members  Last Encounter : 04/05/24  Complexity of Encounter: High  Begin Time: 0745  End Time : 0830  Total Time Calculated: 45 min     Crisis  Type: Code Blue  Spiritual/Emotional needs  Type: Spiritual Support     Grief, Loss, and Adjustments  Type: Life Adjustments                Spiritual Assessment/Intervention/Outcomes:    Assessment: Anxious, Shock, Tearful    Intervention: Nurtured Hope, Prayer (assurance of)/[place], Sustaining Presence/Ministry of presence    Outcome: Concerns relieved, Coping, Less anxious, Less agitated      Care Plan:    Plan and Referrals  Plan/Referrals: Continue to visit, (comment)    Provide continued support as needed or requested      Spiritual Care Services   Electronically signed by Chaplain Pearl on 4/9/2024 at 10:26 AM.    To reach a  for emotional and spiritual support, place an EPIC consult request.   If a  is needed immediately, dial “0” and ask to page the on-call .

## 2024-04-09 NOTE — PROGRESS NOTES
Hospitalist Progress Note      PCP: Adilene Terry MD    Date of Admission: 3/29/2024    Chief Complaint:  code blue called earlier today after patient was experiencing generalized limb movement concerning for seizure, ROSC obtained after 1 round of CPR/Epi, IV Keppra given, CT head ordered and neurology consulted, is afebrile, hypotensive requiring Norepinephrine infusion, on RA    Medications:  Reviewed    Infusion Medications    norepinephrine 2 mcg/min (04/08/24 1831)    sodium chloride Stopped (03/30/24 1524)    dextrose       Scheduled Medications    etomidate  0.3 mg/kg IntraVENous Once    succinylcholine chloride  100 mg IntraVENous Once    docusate sodium  100 mg Oral BID    polyethylene glycol  17 g Oral Daily    albumin human 25%  25 g IntraVENous Q8H    fludrocortisone  0.1 mg Oral Daily    meclizine  25 mg Oral Daily    amoxicillin  500 mg Oral Daily    [Held by provider] insulin glargine  15 Units SubCUTAneous BID    midodrine  20 mg Oral TID WC    insulin lispro  0-16 Units SubCUTAneous TID WC    insulin lispro  0-4 Units SubCUTAneous Nightly    mupirocin   Topical BID    ceFAZolin  2,000 mg IntraVENous Q12H    hydrOXYzine HCl  25 mg Oral BID    sodium chloride flush  5-40 mL IntraVENous 2 times per day    ARIPiprazole  5 mg Oral Daily    atorvastatin  40 mg Oral Nightly    pantoprazole  20 mg Oral Daily    sevelamer  800 mg Oral TID    apixaban  2.5 mg Oral BID    aspirin EC  81 mg Oral Daily    sertraline  50 mg Oral Nightly    miconazole   Topical BID    albuterol  2.5 mg Nebulization BID RT     PRN Meds: iopamidol, zolpidem, HYDROcodone 5 mg - acetaminophen **OR** HYDROcodone 5 mg - acetaminophen, perflutren lipid microspheres, sodium chloride flush, sodium chloride, ondansetron **OR** ondansetron, acetaminophen **OR** acetaminophen, albumin human 25%, glucose, dextrose bolus **OR** dextrose bolus, glucagon (rDNA), dextrose, albuterol      Intake/Output Summary (Last 24 hours) at 4/9/2024

## 2024-04-09 NOTE — CARE COORDINATION
Team ICU quality rounds done this am at bedside. Patients dtr asking questions and Dr. Marsh answering and dtr may want her mom sent to [facility]. She states her mom wants full measures done and she will discuss with her. Pt also has LTAC following and continues on IV Levo.

## 2024-04-10 LAB
ALBUMIN SERPL-MCNC: 5.1 G/DL (ref 3.5–4.6)
ANION GAP SERPL CALCULATED.3IONS-SCNC: 21 MEQ/L (ref 9–15)
BASOPHILS # BLD: 0.1 K/UL (ref 0–0.2)
BASOPHILS NFR BLD: 0.7 %
BUN SERPL-MCNC: 21 MG/DL (ref 8–23)
CALCIUM SERPL-MCNC: 9.6 MG/DL (ref 8.5–9.9)
CHLORIDE SERPL-SCNC: 93 MEQ/L (ref 95–107)
CO2 SERPL-SCNC: 25 MEQ/L (ref 20–31)
CORTISOL COLLECTION INFO: NORMAL
CORTISOL: 15 UG/DL (ref 2.5–19.5)
CORTISOL: 15.7 UG/DL (ref 2.5–19.5)
CORTISOL: 7.1 UG/DL (ref 2.5–19.5)
CREAT SERPL-MCNC: 4.68 MG/DL (ref 0.5–0.9)
EOSINOPHIL # BLD: 0.3 K/UL (ref 0–0.7)
EOSINOPHIL NFR BLD: 3.3 %
ERYTHROCYTE [DISTWIDTH] IN BLOOD BY AUTOMATED COUNT: 16.7 % (ref 11.5–14.5)
ESTIMATED AVERAGE GLUCOSE: 126 MG/DL
GLUCOSE BLD-MCNC: 253 MG/DL (ref 70–99)
GLUCOSE BLD-MCNC: 294 MG/DL (ref 70–99)
GLUCOSE BLD-MCNC: 322 MG/DL (ref 70–99)
GLUCOSE SERPL-MCNC: 211 MG/DL (ref 70–99)
HBA1C MFR BLD: 6 % (ref 4–6)
HCT VFR BLD AUTO: 35.6 % (ref 37–47)
HGB BLD-MCNC: 11.6 G/DL (ref 12–16)
LYMPHOCYTES # BLD: 0.9 K/UL (ref 1–4.8)
LYMPHOCYTES NFR BLD: 9.1 %
MAGNESIUM SERPL-MCNC: 2 MG/DL (ref 1.7–2.4)
MCH RBC QN AUTO: 33 PG (ref 27–31.3)
MCHC RBC AUTO-ENTMCNC: 32.6 % (ref 33–37)
MCV RBC AUTO: 101.4 FL (ref 79.4–94.8)
MONOCYTES # BLD: 1.2 K/UL (ref 0.2–0.8)
MONOCYTES NFR BLD: 11.5 %
NEUTROPHILS # BLD: 7.5 K/UL (ref 1.4–6.5)
NEUTS SEG NFR BLD: 74.9 %
PERFORMED ON: ABNORMAL
PHOSPHATE SERPL-MCNC: 2.9 MG/DL (ref 2.3–4.8)
PLATELET # BLD AUTO: 160 K/UL (ref 130–400)
POTASSIUM SERPL-SCNC: 3.8 MEQ/L (ref 3.4–4.9)
RBC # BLD AUTO: 3.51 M/UL (ref 4.2–5.4)
SODIUM SERPL-SCNC: 139 MEQ/L (ref 135–144)
WBC # BLD AUTO: 10 K/UL (ref 4.8–10.8)

## 2024-04-10 PROCEDURE — 94640 AIRWAY INHALATION TREATMENT: CPT

## 2024-04-10 PROCEDURE — 6360000002 HC RX W HCPCS: Performed by: INTERNAL MEDICINE

## 2024-04-10 PROCEDURE — 6370000000 HC RX 637 (ALT 250 FOR IP): Performed by: COLON & RECTAL SURGERY

## 2024-04-10 PROCEDURE — 6370000000 HC RX 637 (ALT 250 FOR IP): Performed by: INTERNAL MEDICINE

## 2024-04-10 PROCEDURE — 97162 PT EVAL MOD COMPLEX 30 MIN: CPT

## 2024-04-10 PROCEDURE — 83735 ASSAY OF MAGNESIUM: CPT

## 2024-04-10 PROCEDURE — P9047 ALBUMIN (HUMAN), 25%, 50ML: HCPCS | Performed by: INTERNAL MEDICINE

## 2024-04-10 PROCEDURE — 99291 CRITICAL CARE FIRST HOUR: CPT | Performed by: INTERNAL MEDICINE

## 2024-04-10 PROCEDURE — 80069 RENAL FUNCTION PANEL: CPT

## 2024-04-10 PROCEDURE — 99233 SBSQ HOSP IP/OBS HIGH 50: CPT | Performed by: INTERNAL MEDICINE

## 2024-04-10 PROCEDURE — 2700000000 HC OXYGEN THERAPY PER DAY

## 2024-04-10 PROCEDURE — 2580000003 HC RX 258: Performed by: INTERNAL MEDICINE

## 2024-04-10 PROCEDURE — 6360000002 HC RX W HCPCS: Performed by: COLON & RECTAL SURGERY

## 2024-04-10 PROCEDURE — 97166 OT EVAL MOD COMPLEX 45 MIN: CPT

## 2024-04-10 PROCEDURE — 85025 COMPLETE CBC W/AUTO DIFF WBC: CPT

## 2024-04-10 PROCEDURE — 99232 SBSQ HOSP IP/OBS MODERATE 35: CPT | Performed by: INTERNAL MEDICINE

## 2024-04-10 PROCEDURE — 94761 N-INVAS EAR/PLS OXIMETRY MLT: CPT

## 2024-04-10 PROCEDURE — 2580000003 HC RX 258: Performed by: COLON & RECTAL SURGERY

## 2024-04-10 PROCEDURE — 2000000000 HC ICU R&B

## 2024-04-10 PROCEDURE — 99232 SBSQ HOSP IP/OBS MODERATE 35: CPT | Performed by: PSYCHIATRY & NEUROLOGY

## 2024-04-10 PROCEDURE — 2500000003 HC RX 250 WO HCPCS: Performed by: COLON & RECTAL SURGERY

## 2024-04-10 RX ORDER — INSULIN LISPRO 100 [IU]/ML
4 INJECTION, SOLUTION INTRAVENOUS; SUBCUTANEOUS
Status: DISCONTINUED | OUTPATIENT
Start: 2024-04-10 | End: 2024-04-12 | Stop reason: HOSPADM

## 2024-04-10 RX ORDER — INSULIN LISPRO 100 [IU]/ML
0-8 INJECTION, SOLUTION INTRAVENOUS; SUBCUTANEOUS
Status: DISCONTINUED | OUTPATIENT
Start: 2024-04-10 | End: 2024-04-12 | Stop reason: HOSPADM

## 2024-04-10 RX ORDER — INSULIN GLARGINE 100 [IU]/ML
15 INJECTION, SOLUTION SUBCUTANEOUS 2 TIMES DAILY
Status: DISCONTINUED | OUTPATIENT
Start: 2024-04-10 | End: 2024-04-12

## 2024-04-10 RX ORDER — INSULIN GLARGINE 100 [IU]/ML
10 INJECTION, SOLUTION SUBCUTANEOUS 2 TIMES DAILY
Status: DISCONTINUED | OUTPATIENT
Start: 2024-04-10 | End: 2024-04-10

## 2024-04-10 RX ORDER — INSULIN LISPRO 100 [IU]/ML
0-4 INJECTION, SOLUTION INTRAVENOUS; SUBCUTANEOUS NIGHTLY
Status: DISCONTINUED | OUTPATIENT
Start: 2024-04-10 | End: 2024-04-12 | Stop reason: HOSPADM

## 2024-04-10 RX ADMIN — ARIPIPRAZOLE 5 MG: 5 TABLET ORAL at 07:45

## 2024-04-10 RX ADMIN — POLYETHYLENE GLYCOL 3350 17 G: 17 POWDER, FOR SOLUTION ORAL at 07:46

## 2024-04-10 RX ADMIN — HYDROCORTISONE SODIUM SUCCINATE 50 MG: 100 INJECTION, POWDER, FOR SOLUTION INTRAMUSCULAR; INTRAVENOUS at 20:48

## 2024-04-10 RX ADMIN — SEVELAMER CARBONATE 800 MG: 800 TABLET, FILM COATED ORAL at 16:10

## 2024-04-10 RX ADMIN — APIXABAN 2.5 MG: 2.5 TABLET, FILM COATED ORAL at 07:45

## 2024-04-10 RX ADMIN — SEVELAMER CARBONATE 800 MG: 800 TABLET, FILM COATED ORAL at 07:45

## 2024-04-10 RX ADMIN — ALBUMIN (HUMAN) 25 G: 0.25 INJECTION, SOLUTION INTRAVENOUS at 19:54

## 2024-04-10 RX ADMIN — ALBUMIN (HUMAN) 25 G: 0.25 INJECTION, SOLUTION INTRAVENOUS at 04:07

## 2024-04-10 RX ADMIN — INSULIN GLARGINE 15 UNITS: 100 INJECTION, SOLUTION SUBCUTANEOUS at 20:50

## 2024-04-10 RX ADMIN — INSULIN LISPRO 4 UNITS: 100 INJECTION, SOLUTION INTRAVENOUS; SUBCUTANEOUS at 16:55

## 2024-04-10 RX ADMIN — HYDROCORTISONE SODIUM SUCCINATE 50 MG: 100 INJECTION, POWDER, FOR SOLUTION INTRAMUSCULAR; INTRAVENOUS at 16:08

## 2024-04-10 RX ADMIN — ALBUTEROL SULFATE 2.5 MG: 2.5 SOLUTION RESPIRATORY (INHALATION) at 14:44

## 2024-04-10 RX ADMIN — MICONAZOLE NITRATE: 2 POWDER TOPICAL at 20:59

## 2024-04-10 RX ADMIN — DOCUSATE SODIUM 100 MG: 100 CAPSULE, LIQUID FILLED ORAL at 20:48

## 2024-04-10 RX ADMIN — HYDROXYZINE HYDROCHLORIDE 25 MG: 25 TABLET ORAL at 07:45

## 2024-04-10 RX ADMIN — CEFAZOLIN 2000 MG: 2 INJECTION, POWDER, FOR SOLUTION INTRAMUSCULAR; INTRAVENOUS at 15:42

## 2024-04-10 RX ADMIN — FLUDROCORTISONE ACETATE 0.1 MG: 0.1 TABLET ORAL at 07:46

## 2024-04-10 RX ADMIN — SODIUM CHLORIDE, PRESERVATIVE FREE 10 ML: 5 INJECTION INTRAVENOUS at 07:48

## 2024-04-10 RX ADMIN — INSULIN LISPRO 4 UNITS: 100 INJECTION, SOLUTION INTRAVENOUS; SUBCUTANEOUS at 16:54

## 2024-04-10 RX ADMIN — PANTOPRAZOLE SODIUM 20 MG: 20 TABLET, DELAYED RELEASE ORAL at 07:45

## 2024-04-10 RX ADMIN — MIDODRINE HYDROCHLORIDE 20 MG: 10 TABLET ORAL at 11:28

## 2024-04-10 RX ADMIN — AMOXICILLIN 500 MG: 500 CAPSULE ORAL at 07:46

## 2024-04-10 RX ADMIN — INSULIN LISPRO 6 UNITS: 100 INJECTION, SOLUTION INTRAVENOUS; SUBCUTANEOUS at 11:28

## 2024-04-10 RX ADMIN — ATORVASTATIN CALCIUM 40 MG: 40 TABLET, FILM COATED ORAL at 20:48

## 2024-04-10 RX ADMIN — MIDODRINE HYDROCHLORIDE 20 MG: 10 TABLET ORAL at 07:45

## 2024-04-10 RX ADMIN — MICONAZOLE NITRATE: 2 POWDER TOPICAL at 04:07

## 2024-04-10 RX ADMIN — HYDROCODONE BITARTRATE AND ACETAMINOPHEN 2 TABLET: 5; 325 TABLET ORAL at 07:45

## 2024-04-10 RX ADMIN — HYDROXYZINE HYDROCHLORIDE 25 MG: 25 TABLET ORAL at 20:48

## 2024-04-10 RX ADMIN — Medication 2 MCG/MIN: at 18:53

## 2024-04-10 RX ADMIN — MECLIZINE 25 MG: 12.5 TABLET ORAL at 07:44

## 2024-04-10 RX ADMIN — HYDROCODONE BITARTRATE AND ACETAMINOPHEN 2 TABLET: 5; 325 TABLET ORAL at 16:17

## 2024-04-10 RX ADMIN — SERTRALINE HYDROCHLORIDE 50 MG: 50 TABLET ORAL at 20:48

## 2024-04-10 RX ADMIN — HYDROCORTISONE SODIUM SUCCINATE 50 MG: 100 INJECTION, POWDER, FOR SOLUTION INTRAMUSCULAR; INTRAVENOUS at 08:45

## 2024-04-10 RX ADMIN — ALBUTEROL SULFATE 2.5 MG: 2.5 SOLUTION RESPIRATORY (INHALATION) at 03:42

## 2024-04-10 RX ADMIN — SEVELAMER CARBONATE 800 MG: 800 TABLET, FILM COATED ORAL at 20:48

## 2024-04-10 RX ADMIN — ASPIRIN 81 MG: 81 TABLET, COATED ORAL at 07:44

## 2024-04-10 RX ADMIN — MIDODRINE HYDROCHLORIDE 20 MG: 10 TABLET ORAL at 17:04

## 2024-04-10 RX ADMIN — APIXABAN 2.5 MG: 2.5 TABLET, FILM COATED ORAL at 20:48

## 2024-04-10 RX ADMIN — ALBUMIN (HUMAN) 25 G: 0.25 INJECTION, SOLUTION INTRAVENOUS at 11:33

## 2024-04-10 RX ADMIN — MICONAZOLE NITRATE: 2 POWDER TOPICAL at 16:08

## 2024-04-10 RX ADMIN — DOCUSATE SODIUM 100 MG: 100 CAPSULE, LIQUID FILLED ORAL at 07:45

## 2024-04-10 RX ADMIN — CEFAZOLIN 2000 MG: 2 INJECTION, POWDER, FOR SOLUTION INTRAMUSCULAR; INTRAVENOUS at 03:11

## 2024-04-10 RX ADMIN — INSULIN GLARGINE 10 UNITS: 100 INJECTION, SOLUTION SUBCUTANEOUS at 11:27

## 2024-04-10 RX ADMIN — MUPIROCIN: 20 OINTMENT TOPICAL at 21:24

## 2024-04-10 ASSESSMENT — PAIN SCALES - GENERAL
PAINLEVEL_OUTOF10: 7
PAINLEVEL_OUTOF10: 0
PAINLEVEL_OUTOF10: 8

## 2024-04-10 ASSESSMENT — ENCOUNTER SYMPTOMS: COLOR CHANGE: 1

## 2024-04-10 ASSESSMENT — PAIN DESCRIPTION - LOCATION
LOCATION: CHEST
LOCATION: CHEST

## 2024-04-10 NOTE — INTERDISCIPLINARY ROUNDS
Spiritual Care Services     Summary of Visit:   participated in ICU rounds. Patient coded yesterday but is doing better today.     Encounter Summary  Encounter Overview/Reason : Interdisciplinary rounds  Service Provided For:: Patient  Referral/Consult From:: Multi-disciplinary team  Support System: Children, Family members  Last Encounter : 04/05/24  Complexity of Encounter: High  Begin Time: 0745  End Time : 0830  Total Time Calculated: 45 min     Crisis  Type: Code Blue  Spiritual/Emotional needs  Type: Spiritual Support     Grief, Loss, and Adjustments  Type: Life Adjustments                Spiritual Assessment/Intervention/Outcomes:    Assessment: Anxious, Shock, Tearful    Intervention: Nurtured Hope, Prayer (assurance of)/[place], Sustaining Presence/Ministry of presence    Outcome: Concerns relieved, Coping, Less anxious, Less agitated      Care Plan:    Plan and Referrals  Plan/Referrals: Continue to visit, (comment)    Provide pastoral support as needed or required      Spiritual Care Services   Electronically signed by Chaplain Pearl on 4/10/2024 at 11:02 AM.    To reach a  for emotional and spiritual support, place an EPIC consult request.   If a  is needed immediately, dial “0” and ask to page the on-call .

## 2024-04-10 NOTE — PLAN OF CARE
Problem: Discharge Planning  Goal: Discharge to home or other facility with appropriate resources  Outcome: Progressing     Problem: Safety - Adult  Goal: Free from fall injury  Outcome: Progressing     Problem: Skin/Tissue Integrity  Goal: Absence of new skin breakdown  Description: 1.  Monitor for areas of redness and/or skin breakdown  2.  Assess vascular access sites hourly  3.  Every 4-6 hours minimum:  Change oxygen saturation probe site  4.  Every 4-6 hours:  If on nasal continuous positive airway pressure, respiratory therapy assess nares and determine need for appliance change or resting period.  Outcome: Progressing     Problem: Chronic Conditions and Co-morbidities  Goal: Patient's chronic conditions and co-morbidity symptoms are monitored and maintained or improved  Outcome: Progressing     Problem: Pain  Goal: Verbalizes/displays adequate comfort level or baseline comfort level  Outcome: Progressing     Problem: Respiratory - Adult  Goal: Achieves optimal ventilation and oxygenation  Outcome: Progressing     Problem: Cardiovascular - Adult  Goal: Maintains optimal cardiac output and hemodynamic stability  Outcome: Progressing  Goal: Absence of cardiac dysrhythmias or at baseline  Outcome: Progressing     Problem: Skin/Tissue Integrity - Adult  Goal: Skin integrity remains intact  Outcome: Progressing  Goal: Incisions, wounds, or drain sites healing without S/S of infection  Outcome: Progressing     Problem: Musculoskeletal - Adult  Goal: Return mobility to safest level of function  Outcome: Progressing     Problem: Gastrointestinal - Adult  Goal: Minimal or absence of nausea and vomiting  Outcome: Progressing  Goal: Maintains or returns to baseline bowel function  Outcome: Progressing  Goal: Maintains adequate nutritional intake  Outcome: Progressing     Problem: Genitourinary - Adult  Goal: Absence of urinary retention  Outcome: Progressing     Problem: Infection - Adult  Goal: Absence of infection  at discharge  Outcome: Progressing     Problem: Metabolic/Fluid and Electrolytes - Adult  Goal: Electrolytes maintained within normal limits  Outcome: Progressing  Goal: Hemodynamic stability and optimal renal function maintained  Outcome: Progressing  Goal: Glucose maintained within prescribed range  Outcome: Progressing     Problem: Hematologic - Adult  Goal: Maintains hematologic stability  Outcome: Progressing     Problem: Nutrition Deficit:  Goal: Optimize nutritional status  Outcome: Progressing

## 2024-04-10 NOTE — PROGRESS NOTES
7p-7a narrative    Assessment completed, pt resting with eyes closed, does awaken with verbal stimulation, speech slightly slurred, does become more clear as pt awakens, A&Ox4, ls dim, abd soft and active, pulses palpable, call light in reach, takes pills whole with soda, pt returns to sleep after care completed. Call light on, pt c/o pain to chest, rates pain 7/10, medicated with oral pain medication @2144. O2 found removed, o2 sat decreased to 89%, pt encouraged to keep on, repositioned for comfort. Call light in reach

## 2024-04-10 NOTE — CONSULTS
[facility]                   [address] [place] [address]                              CONSULTATION      PATIENT NAME: VELASQUEZ GARBER            : 1960  MED REC NO: [id number]                        ROOM: [facility]  ACCOUNT NO: [id number]                       ADMIT DATE: 2024  PROVIDER: Andrei Nino MD    ENDOCRINE CONSULT    CONSULT DATE:  2024    REFERRING PHYSICIAN:  David Beltrán MD      REASON FOR CONSULT:  Adrenal insufficiency.    HISTORY OF PRESENT ILLNESS:  The patient is a 64-year-old female known to our endocrine service for management of diabetes with inadequate control, currently in ICU.  The patient also has history of end-stage renal disease on dialysis, was admitted to [facility] after fluid overload and having shortness of breath after missed hemodialysis.  Initial admitting diagnosis was end-stage renal disease, on hemodialysis; fluid overload; chronic hypotension.  The patient also has had lower blood sugars, lowest blood sugars were in the 73.  PO intake has been inadequate.  The patient is seen by multiple specialists including Critical Care, hospitalist, neurologist, and cardiologist.  The patient is seen by Cardiology here for atrial flutter, increased troponins, hyperkalemia.  Neuro consult, impression, generalized seizure today.  The patient's electrolytes were reviewed.  Sodium 136, potassium was 3.6, chloride 92, CO2 was 24, BUN 33, creatinine 6.30.  Blood sugars have been staying between 100-120 range, lowest was 73.  Hemoglobin A1c was 6.4 in December.  Thyroid function test was 1.310.  The patient did have a random cortisol done on the , it was 14.1.  The patient is a poor historian, very drowsy.  Blood pressures have also been on the low side.  Currently, on Florinef and Levophed.    PAST MEDICAL HISTORY:  Significant for type 2 diabetes, end-stage renal disease, coronary artery disease, history of CVA,  hypertension, neuropathy.    SURGICAL HISTORY:  AV fistula placement, C-sections, hysterectomy, thrombectomy, right toe amputation, knee arthroplasty.    FAMILY HISTORY:  Significant for cancer, diabetes, hypertension, breast cancer, mental illness.    SOCIAL HISTORY:  Denies any smoking, substance abuse.    ALLERGIES:  INCLUDE CODEINE, OXYCONTIN.      MEDICATIONS:  Here include Lantus which is on hold, albumin, Proventil, Eliquis, amoxicillin, Abilify, Lipitor, Ancef, Florinef, ProAmatine, nicotine, Bactroban, Protonix, Zoloft, Renvela, Levophed.    REVIEW OF SYSTEMS:  Unable to obtain other than weakness, recent seizures.    PHYSICAL EXAMINATION:  GENERAL:  Patient appears very ill, lying in bed, getting dialysis.  VITAL SIGNS:  Blood pressure was 147/27, pulse rate was 76, respirations 16, temperature 97.7.  HEENT:  Normocephalic.  Face appears plethoric.  NECK:  Supple.  Trachea in midline.    CHEST:  Upper chest reveals significant ecchymosis.  Lungs were showing bilaterally equal air entry.  HEART:  Sounds were normal.  No murmurs or rubs were present.  ABDOMEN:  Soft, moderately obese.  Bowel sounds are present.  No organomegaly or tenderness.  EXTREMITIES:  Lower extremities reveal no edema.  NEUROLOGIC:  Unable to complete, very drowsy.  PSYCH:  Unable to complete.  SKIN:  Significant ecchymosis.  MUSCULOSKELETAL:  No joint swelling.    LABORATORY DATA:  As above.    ASSESSMENT:  Hypotension, etiology unclear.  Could be due to septic shock, on IV antibiotics.  End-stage renal disease, on hemodialysis.  Atrial fibrillation.  Low blood sugars due to end-stage renal disease, nutritional.    PLAN:  We will get ACTH stimulation test to assess peak adrenal reserves.  Continue other medications in the meantime.  Discontinue insulin Humalog coverage for now.  Might consider a PPN low dose if the patient unable to eat.  Reviewed nursing consultant's note.    Total time spent was 60 minutes.    Thank you for the

## 2024-04-10 NOTE — PROGRESS NOTES
Pulmonary & Critical Care Medicine ICU Progress Note  Chief complaint : Shock    Subjunctive/24 hour events :   Patient seen and examined during multidisciplinary rounds with RN, charge nurse, RT, pharmacy, dietitian, and social service.      Doing good, has no complaint, episode of seizure yesterday back to baseline, no chest pain, still under for the department, no fever, no nausea or vomiting.    New information updated in the note today, rest of the examination did not change compared to yesterday.  Social History     Tobacco Use    Smoking status: Never     Passive exposure: Never    Smokeless tobacco: Never   Substance Use Topics    Alcohol use: No     Alcohol/week: 0.0 standard drinks of alcohol         Problem Relation Age of Onset    Cancer Mother 68        survived    Breast Cancer Mother     Hypertension Father     Diabetes Sister     Mental Illness Sister     Colon Cancer Neg Hx        Recent Labs     04/09/24  0754   PHART 7.317*   AZM9CTB 37   PO2ART 272*         MV Settings:     / / /            IV:   norepinephrine 12 mcg/min (04/10/24 0434)    sodium chloride Stopped (03/30/24 1524)    dextrose         Vitals:  BP (!) 120/58   Pulse 69   Temp 97.7 °F (36.5 °C) (Axillary)   Resp 16   Ht 1.702 m (5' 7.01\")   Wt 96.6 kg (212 lb 15.4 oz)   LMP  (LMP Unknown)   SpO2 96%   BMI 33.35 kg/m²    Tmax:        Intake/Output Summary (Last 24 hours) at 4/10/2024 0832  Last data filed at 4/10/2024 0651  Gross per 24 hour   Intake 1237.83 ml   Output --   Net 1237.83 ml         EXAM:  General: alert, cooperative, no distress, slightly confused  Head: normocephalic, atraumatic  Eyes:No gross abnormalities.  ENT:  MMM no lesions  Neck:  supple and no masses  Chest : clear to auscultation bilaterally- no wheezes, rales or rhonchi, normal air movement, no respiratory distress  Heart:: Heart sounds are normal.  Regular rate and rhythm without murmur, gallop or rub.  ABD:  symmetric, soft,  04/08/24  0526 04/09/24  0600   AST 10 10   ALT <5 <5   BILITOT 0.8* 0.9*   ALKPHOS 71 64       PT/INR: No results for input(s): \"PROTIME\", \"INR\" in the last 72 hours.  APTT: No results for input(s): \"APTT\" in the last 72 hours.  UA:No results for input(s): \"NITRITE\", \"COLORU\", \"PHUR\", \"LABCAST\", \"WBCUA\", \"RBCUA\", \"MUCUS\", \"TRICHOMONAS\", \"YEAST\", \"BACTERIA\", \"CLARITYU\", \"SPECGRAV\", \"LEUKOCYTESUR\", \"UROBILINOGEN\", \"BILIRUBINUR\", \"BLOODU\", \"GLUCOSEU\", \"AMORPHOUS\" in the last 72 hours.    Invalid input(s): \"KETONESU\"    Cultures:  Negative so far  Films:  CXR films reviewed by me and it showed bilateral pleural effusion    Arterial ultrasound shows no subclavian artery stenosis    Assessment:  This is a critically ill patient at risk of deterioration / death , needing close ICU monitoring and intervention due to below noted problems   Postcardiac arrest, brief, etiology is not clear  Lactic acidosis, postcardiac arrest  Shock etiology is not clear,?  Septic,    End-stage renal disease on dialysis  Volume overload  Swelling right lower extremity, no DVT  A-fib on   Eliquis   Insomnia  Constipation  Possible insufficiency, cosyntropin study test done yesterday, 60 minutes cortisol 7 point    Recommendation    Levophed target mean arterial pressure 65-70  Continue albumin 25 g 3 times daily  Continue midodrine  Continue Florinef  Monitor blood pressure and lower extremities  Continue Eliquis   Target blood sugar 140-180   Laxatives  Ambien as needed  Solu-Cortef  Continue Florinef  A line            Due to the immediate potential for life-threatening deterioration due to shock I spent  35 minutes providing critical care.  This time is excluding time spent performing procedures.          Electronically signed by Say Marsh MD,  [facility] ,on 4/10/2024 at 8:32 AM

## 2024-04-10 NOTE — PROGRESS NOTES
Hospitalist Progress Note      PCP: Adilene Terry MD    Date of Admission: 3/29/2024    Chief Complaint:  no acute events, is afebrile, still hypotensive requiring Norepinephrine infusion, on 2 liters of NC    Medications:  Reviewed    Infusion Medications    norepinephrine 12 mcg/min (04/10/24 0434)    sodium chloride Stopped (03/30/24 1524)    dextrose       Scheduled Medications    hydrocortisone sodium succinate PF  50 mg IntraVENous Q6H    insulin glargine  10 Units SubCUTAneous BID    insulin lispro  0-8 Units SubCUTAneous TID WC    insulin lispro  0-4 Units SubCUTAneous Nightly    docusate sodium  100 mg Oral BID    polyethylene glycol  17 g Oral Daily    albumin human 25%  25 g IntraVENous Q8H    fludrocortisone  0.1 mg Oral Daily    meclizine  25 mg Oral Daily    amoxicillin  500 mg Oral Daily    midodrine  20 mg Oral TID WC    mupirocin   Topical BID    ceFAZolin  2,000 mg IntraVENous Q12H    hydrOXYzine HCl  25 mg Oral BID    sodium chloride flush  5-40 mL IntraVENous 2 times per day    ARIPiprazole  5 mg Oral Daily    atorvastatin  40 mg Oral Nightly    pantoprazole  20 mg Oral Daily    sevelamer  800 mg Oral TID    apixaban  2.5 mg Oral BID    aspirin EC  81 mg Oral Daily    sertraline  50 mg Oral Nightly    miconazole   Topical BID    albuterol  2.5 mg Nebulization BID RT     PRN Meds: perflutren lipid microspheres, zolpidem, HYDROcodone 5 mg - acetaminophen **OR** HYDROcodone 5 mg - acetaminophen, sodium chloride flush, sodium chloride, ondansetron **OR** ondansetron, acetaminophen **OR** acetaminophen, albumin human 25%, glucose, dextrose bolus **OR** dextrose bolus, glucagon (rDNA), dextrose, albuterol      Intake/Output Summary (Last 24 hours) at 4/10/2024 0947  Last data filed at 4/10/2024 0651  Gross per 24 hour   Intake 1237.83 ml   Output --   Net 1237.83 ml         Exam:    BP (!) 120/58   Pulse 69   Temp 97.7 °F (36.5 °C) (Axillary)   Resp 16   Ht 1.702 m (5' 7.01\")   Wt 96.6 kg (212

## 2024-04-10 NOTE — PROGRESS NOTES
Physical Therapy Med Surg Initial Assessment  [place] [facility]  [place]: [place] [address]       NAME: Michelle Le  : 1960 (64 y.o.)  MRN: [id number]  CODE STATUS: Full Code    Date of Service: 4/10/2024    Patient Diagnosis(es): ESRD on dialysis (HCC) [N18.6, Z99.2]  Fluid overload [E87.70]  Acute decompensated heart failure (HCC) [I50.9]  Hypotension, unspecified hypotension type [I95.9]   Chief Complaint   Patient presents with    Shortness of Breath     Sob poss. Due to fluid overload     Patient Active Problem List    Diagnosis Date Noted    Unstable angina ([facility]) 2022    Chest pain     Infection of venous access port 2022    Lumbar stenosis with neurogenic claudication 07/15/2022    Falls infrequently 07/15/2022    Cervical stenosis of spinal canal     Ataxic gait     Chronic hepatitis C ([facility]) 07/10/2022    Catheter-related bloodstream infection     Enterococcus faecalis infection     Sepsis due to skin infection (HCC) 2022    Sarcopenia 2022    Fall from standing     DJD (degenerative joint disease), cervical 2022    Closed head injury     MRSA bacteremia 2022    Sepsis due to Enterococcus ([facility])     Local infection due to central venous catheter     Impaired mobility and activities of daily living dt polyneuropathy and reccent fall  2022    Closed T11 fracture ([facility]) 2022    Encephalopathy acute 2022    Unspecified open wound of right upper arm, initial encounter 2022    Hyperkalemia 2022    CKD (chronic kidney disease) stage 4, GFR 15-29 ml/min ([facility]) 2022    Adrenal insufficiency ([facility]) 2024    Hypogammaglobulinemia ([facility]) 2024    Lack of intravenous access 2024    Pressure injury, unstageable, with suspected deep tissue injury (HCC) 2024    Nonhealing nonsurgical wound 2024    Skin picking habit 2024    Hypotension 2024    ESRD on dialysis (HCC) 2024    Acute decompensated     DIAGNOSTIC CARDIAC CATH LAB PROCEDURE  10/02/2019    DIALYSIS CATHETER INSERTION Left 06/03/2022    Tunneled Symetrex 15.5F x 23cm hemodialysis catheter inserted by Dr. White    DIALYSIS CATHETER INSERTION Left 07/18/2022    [id number] replamcent tunneld HD cath by Dr. White    HYSTERECTOMY, TOTAL ABDOMINAL (CERVIX REMOVED)      one ovary intact, Dr Fabian, menorrhagia    IR THROMBECTOMY PERCUT AVF  06/06/2022    IR THROMBECTOMY PERCUT AVF 6/6/2022 [facility] PROCEDURE    IR TUNNELED CATHETER PLACEMENT GREATER THAN 5 YEARS  06/03/2022    IR TUNNELED CATHETER PLACEMENT GREATER THAN 5 YEARS 6/3/2022 [facility] PROCEDURE    IR TUNNELED CATHETER PLACEMENT GREATER THAN 5 YEARS Left 07/07/2022 7/18/22 Dr. White exchanged to 15.5F x 28 cm.      15.5 fr by 23 cm Symetrex dialysis catheter inserted by Dr White    IR TUNNELED CATHETER PLACEMENT GREATER THAN 5 YEARS  07/07/2022    IR TUNNELED CATHETER PLACEMENT GREATER THAN 5 YEARS 7/7/2022 [facility] PROCEDURE    NH ARTHRP KNE CONDYLE&PLATU MEDIAL&LAT COMPARTMENTS Left 06/21/2018    LEFT KNEE TOTAL KNEE ARTHROPLASTY, SHAYNA, NERVE BLOCK performed by Milad Sharma MD at [facility] [place]    PTCA      TOE AMPUTATION Right     TOTAL KNEE ARTHROPLASTY  05/19/2016    Dr Sharma    TUNNELED VENOUS CATHETER PLACEMENT Right 07/01/2020    tunneled HD catheter per Dr White    VASCULAR SURGERY Right 4/4/2024    Tunneled central venous catheter ultrasound-guided. Room ICU-05. performed by Joss Denise MD at [facility] [place]       Chart Reviewed: Yes  Patient assessed for rehabilitation services?: Yes  Additional Pertinent Hx: Pt with CODE BLUE 4/9/24  Family / Caregiver Present: No  Diagnosis: Fluid overload  General Comment  Comments: Pt resting in bed - agreeable to PT evaluation    Restrictions:  Restrictions/Precautions: Fall Risk, Up as Tolerated     SUBJECTIVE:   Subjective: \"My chest is sore.\"    Pain  Pain: Chest pain pre and post session 7/10. Recently medicated.

## 2024-04-10 NOTE — PROGRESS NOTES
Renal Progress Note  Assessment and Plan:   64-year-old lady with end-stage renal disease on hemodialysis Tuesday Thursday Saturday.  Chronic hypotension maintained on large dose midodrine.  Admitted with SOB.  Sig above dry weight.  Doesn't make urine.  EF is normal.  Transferred to ICU with hypotension. Her DW is at 97 kg as outpt.  Had been leaving above that. Code blue 4/9 s/p ROSC following 1 round of CPR. Being seen by neurology     Plan:  - continue IHD TTS  - no indication for WONG       Patient Active Problem List:     Coronary artery disease involving native coronary artery of native heart with angina pectoris (HCC)     Schizophrenia, paranoid, chronic     Metabolic syndrome     Vitamin B 12 deficiency     Cerebral microvascular disease     Mixed hyperlipidemia     Other hammer toe (acquired)     Vitamin D insufficiency     Incontinence of urine     Diabetic nephropathy with proteinuria ([facility])     Essential (primary) hypertension     History of type C viral hepatitis     Urinary incontinence due to cognitive impairment     History of seizures     Stented coronary artery-plan is to stay on Plavix indefinately per Dr Walker     Diabetes mellitus ([facility])     Controlled type 2 diabetes mellitus with diabetic neuropathy, with long-term current use of insulin ([facility])     Hemiparesis, left ([facility])     Angina, class II ([facility])     Pain, unspecified     Tardive dyskinesia     Shortness of breath     Uncontrolled type 2 diabetes mellitus with hyperglycemia ([facility])     Chronic diastolic congestive heart failure ([facility])     [place] (obstructive sleep apnea)     Pulmonary hypertension, unspecified ([facility])     Class 2 severe obesity with serious comorbidity and body mass index (BMI) of 36.0 to 36.9 in adult ([facility])     Edema     Closed supracondylar fracture of right humerus     Other chronic pain     Palliative care patient     Recurrent falls     Renal failure     Difficulty in walking     ESRD (end stage renal disease) on dialysis ([facility])

## 2024-04-10 NOTE — PLAN OF CARE
See OT evaluation for all goals and OT POC. Electronically signed by ELDA Tsai/L on 4/10/2024 at 11:17 AM

## 2024-04-10 NOTE — PROGRESS NOTES
Cardiology progress note    Patient Name: Michelle Le  Admit Date: 3/29/2024 10:23 AM  MR #: [id number]  : 1960    Attending Physician: Werner Ortiz MD  Reason for consult: Hypotension    History of Presenting Illness:      Michelle Le is a 64 y.o. female on hospital day 12 with a history of CAD prior PCI to the LAD, CABG LIMA to LAD and tricuspid valve repair complicated by tamponade and pericardial window, end-stage renal disease on hemodialysis, hypertension, hyperlipidemia, atrial fibrillation on Eliquis admitted to the hospital for shortness of breath. History Obtained From:  patient, electronic medical record    Patient hyperkalemic on admission 6.4  EKG atrial flutter 58 bpm  Troponins flat 130  =================  Hospital course  4/10/2024  Patient laying in bed  Reports feeling better smiling  [employer] members at bedside  Patient is now off Levophed  Patient on Solu-Cortef    2024  Patient laying in bed looks comfortable  Undergoing dialysis  This morning patient was noted unresponsive and having a seizure-like activity, no pulse was found.  Patient underwent CPR ROSC achieved after 1 round of CPR  Currently also undergoing echocardiogram which roughly looks with preserved ejection fraction  On Levophed    2024  Patient laying in bed looks comfortable  Still on low-dose Levophed  Telemetry sinus rhythm        3/31/2024  Patient now in ICU due to hypotension  Currently patient on Levophed  Patient looks comfortable denies chest pain or shortness of breath  Still hyperkalemic but condition improving    2024: Patient is resting comfortably in bed and appears to be in no acute distress at this time.  She is on room air.  She is talking to me comfortably without any distress.  Patient went for dialysis today, levo was started during dialysis.  Patient worried about her blood pressures.  Patient denies any chest pain, or other anginal type symptoms.  Reports that her shortness of  adrenal insufficiency  Seizure activity 4/9/2024, cardiac arrest?  CAD prior PCI to the LAD, CABG LIMA to LAD   Tricuspid valve repair complicated by tamponade and pericardial window,   End-stage renal disease on hemodialysis,   Hypertension,   Hyperlipidemia,   Atrial fibrillation on Eliquis  Hyperkalemia  Hypotension.  Bilateral upper extremity arterial ultrasound to rule out upper extremity stenosis  TTE 3/6/2024 EF 55 to 60%  Plan:   From cardiology standpoint patient can be transferred to a telemetry floor bed  Continue telemetry  Continue midodrine-wean off as tolerated  Continue steroids as per endocrinology  Continue Eliquis 2.5 mg p.o. twice daily  Continue hemodialysis as per nephrology  Continue aspirin and atorvastatin  Once patient is more hemodynamically stable start beta-blocker  Please keep potassium between 4 and 5 magnesium above 2  Please keep hemoglobin above 8 and place above 50  Ischemic evaluation was recommended as outpatient  Comments:       Thank you for allowing us to participate in the care of this patient.     Will continue to follow.    Electronically signed by David Beltrán MD on 4/10/2024 at 1:37 PM

## 2024-04-10 NOTE — PROGRESS NOTES
[facility] OCCUPATIONAL THERAPY EVALUATION - ACUTE     NAME: Michelle Le  : 1960 (64 y.o.)  MRN: [id number]  CODE STATUS: Full Code  Room: [address]    Date of Service: 4/10/2024    Patient Diagnosis(es): ESRD on dialysis (HCC) [N18.6, Z99.2]  Fluid overload [E87.70]  Acute decompensated heart failure (HCC) [I50.9]  Hypotension, unspecified hypotension type [I95.9]   Patient Active Problem List    Diagnosis Date Noted    Unstable angina (HCC) 2022    Chest pain     Infection of venous access port 2022    Lumbar stenosis with neurogenic claudication 07/15/2022    Falls infrequently 07/15/2022    Cervical stenosis of spinal canal     Ataxic gait     Chronic hepatitis C ([facility]) 07/10/2022    Catheter-related bloodstream infection     Enterococcus faecalis infection     Sepsis due to skin infection (HCC) 2022    Sarcopenia 2022    Fall from standing     DJD (degenerative joint disease), cervical 2022    Closed head injury     MRSA bacteremia 2022    Sepsis due to Enterococcus ([facility])     Local infection due to central venous catheter     Impaired mobility and activities of daily living dt polyneuropathy and reccent fall  2022    Closed T11 fracture ([facility]) 2022    Encephalopathy acute 2022    Unspecified open wound of right upper arm, initial encounter 2022    Hyperkalemia 2022    CKD (chronic kidney disease) stage 4, GFR 15-29 ml/min ([facility]) 2022    Adrenal insufficiency ([facility]) 2024    Hypogammaglobulinemia ([facility]) 2024    Lack of intravenous access 2024    Pressure injury, unstageable, with suspected deep tissue injury ([facility]) 2024    Nonhealing nonsurgical wound 2024    Skin picking habit 2024    Hypotension 2024    ESRD on dialysis (HCC) 2024    Acute decompensated heart failure (HCC) 2024    Constipation 2024    Chronic antibiotic suppression 2024    Chronic infection of  assistance = Pt. does not perform task at an independent level but does not need physical assistance, requires verbal cues  Minimal, Moderate, Maximal Assistance = Pt. requires physical assistance (25%, 50%, 75% assist from helper) for task but is able to actively participate in task   Dependent = Pt. requires total assistance with task and is not able to actively participate with task completion     Plan:  Occupational Therapy Plan  Times Per Week: 1-4x  Therapy Duration:  (Length of acute stay)  Current Treatment Recommendations: Strengthening, Balance training, Functional mobility training, Endurance training, Pain management, Safety education & training, Patient/Caregiver education & training, Equipment evaluation, education, & procurement, Self-Care / ADL, Home management training    Goals:   Patient will:    - Improve functional endurance to tolerate/complete 30 mins of ADL's  - Be Setup in UB ADLs   - Be Min A in LB ADLs  - Be SBA in ADL transfers without LOB  - Be SBA in toileting tasks  - Improve B UE strength and endurance to 4/5 in order to participate in self-care activities as projected.  - Access appropriate D/C site with as few architectural barriers as possible.  - Sequence self-care tasks with no verbal cues for safety    Patient Goal: Patient goals : \"I want to be able to get strong enough to go back home\"     Discussed and agreed upon: Yes Comments:       Therapy Time:   Individual   Time In 1024   Time Out 1040   Minutes 16     Eval: 16 minutes     Electronically signed by:    CATHY Tsai,   4/10/2024, 11:07 AM

## 2024-04-10 NOTE — PROGRESS NOTES
Pt given tap water enema at that time pt's HR in the 130's, pt holding her breath and desating. Respiratory called and pt briefly placed on non rebreather.  Sat's improving  and Hr now low 110's. Will continue to monitor.

## 2024-04-10 NOTE — PROGRESS NOTES
1915: Report received at beside from Shabana HUDDLESTON. Skin assessed with Shabana HUDDLESTON. Patient restarted on levophed.     2000: Patient assessed at this time.     0000: Patient assessed at this time. No signs of acute distress noted at this time.     0145: Patient bathed, linens changed. Wound dressings changed.     0400: Patient assessed at this time. No acute changes. Patient remains on levophed.     0715: Report given to Nevin HUDDLESTON. Skin assessed at this time. Patient off of levophed at this time.

## 2024-04-10 NOTE — PROGRESS NOTES
Neurology Follow up    SUBJECTIVE: Patient actually asking about her CAT scan and other tests.  She remained stable overnight no seizure activity noted.  CT scan reviewed and is not Sastry of any acute findings EEG is essentially normal.    Current Facility-Administered Medications   Medication Dose Route Frequency Provider Last Rate Last Admin    hydrocortisone sodium succinate PF (SOLU-CORTEF) injection 50 mg  50 mg IntraVENous Q6H Say Marsh MD   50 mg at 04/10/24 0845    insulin glargine (LANTUS) injection vial 10 Units  10 Units SubCUTAneous BID Andrei Nino MD        insulin lispro (HUMALOG) injection vial 0-8 Units  0-8 Units SubCUTAneous TID WC Andrei Nino MD        insulin lispro (HUMALOG) injection vial 0-4 Units  0-4 Units SubCUTAneous Nightly Andrei Nino MD        perflutren lipid microspheres (DEFINITY) injection 1.5 mL  1.5 mL IntraVENous ONCE PRN Milagros Hanna, LORETO - CNP        docusate sodium (COLACE) capsule 100 mg  100 mg Oral BID Say Marsh MD   100 mg at 04/10/24 0745    polyethylene glycol (GLYCOLAX) packet 17 g  17 g Oral Daily Say Marsh MD   17 g at 04/10/24 0746    zolpidem (AMBIEN) tablet 2.5 mg  2.5 mg Oral Nightly PRN Say Marsh MD        albumin human 25% IV solution 25 g  25 g IntraVENous Q8H Say Marsh MD   Stopped at 04/10/24 0518    fludrocortisone (FLORINEF) tablet 0.1 mg  0.1 mg Oral Daily Say Marsh MD   0.1 mg at 04/10/24 0746    meclizine (ANTIVERT) tablet 25 mg  25 mg Oral Daily Say Marsh MD   25 mg at 04/10/24 0744    amoxicillin (AMOXIL) capsule 500 mg  500 mg Oral Daily Autumn Rojo MD   500 mg at 04/10/24 0746    HYDROcodone-acetaminophen (NORCO) 5-325 MG per tablet 1 tablet  1 tablet Oral Q4H PRN Joss Denise MD   1 tablet at 04/09/24 1401    Or    HYDROcodone-acetaminophen (NORCO) 5-325 MG per tablet 2 tablet  2 tablet Oral Q4H PRN Joss Denise MD   2 tablet at 04/10/24 4908    midodrine

## 2024-04-10 NOTE — PROGRESS NOTES
Progress Note  Date:4/10/2024       Room:[place] [address]  Patient Name:Michelle Le     YOB: 1960     Age:64 y.o.    Chief complaint adrenal insufficiency/uncontrolled diabetes    Subjective    Subjective:  Symptoms:  Stable.    Diet:  Poor intake.    Activity level: Impaired due to weakness.    Pain:  She reports no pain.       Review of Systems   Constitutional:  Positive for fatigue.   Cardiovascular: Negative.    Skin:  Positive for color change.     Objective         Vitals Last 24 Hours:  TEMPERATURE:  Temp  Av.7 °F (36.5 °C)  Min: 97.5 °F (36.4 °C)  Max: 97.8 °F (36.6 °C)  RESPIRATIONS RANGE: Resp  Avg: 15.8  Min: 12  Max: 25  PULSE OXIMETRY RANGE: SpO2  Av.2 %  Min: 90 %  Max: 100 %  PULSE RANGE: Pulse  Av  Min: 66  Max: 96  BLOOD PRESSURE RANGE: Systolic (24hrs), Av , Min:109 , Max:165   ; Diastolic (24hrs), Av, Min:12, Max:103    I/O (24Hr):    Intake/Output Summary (Last 24 hours) at 4/10/2024 1338  Last data filed at 4/10/2024 0651  Gross per 24 hour   Intake 1237.83 ml   Output --   Net 1237.83 ml     Objective:  General Appearance:  Ill-appearing.    Vital signs: (most recent): Blood pressure (!) 152/50, pulse 66, temperature 97.8 °F (36.6 °C), temperature source Axillary, resp. rate 14, height 1.702 m (5' 7.01\"), weight 96.6 kg (212 lb 15.4 oz), SpO2 96 %, not currently breastfeeding.  Vital signs are normal.    HEENT: Normal HEENT exam.    Lungs:  Normal effort.    Heart: Normal rate.    Abdomen: Abdomen is soft.    Extremities: Normal range of motion.  There is dependent edema.    Neurological: Patient is alert.    Skin:  There is ecchymosis.      Labs/Imaging/Diagnostics    Labs:  CBC:  Recent Labs     24  0526 24  0600 24  0754 04/10/24  0533   WBC 9.4 8.4  --  10.0   RBC 3.50* 3.32*  --  3.51*   HGB 11.7* 11.1* 14.2 11.6*   HCT 34.9* 33.3*  --  35.6*   MCV 99.7* 100.3*  --  101.4*   RDW 16.6* 16.5*  --  16.7*   * 127*  --  160  note total time spent 35 minutes).       Electronically signed by Andrei Nino MD on 4/10/24 at 1:38 PM EDT

## 2024-04-11 LAB
ALBUMIN SERPL-MCNC: 5.2 G/DL (ref 3.5–4.6)
ANION GAP SERPL CALCULATED.3IONS-SCNC: 19 MEQ/L (ref 9–15)
BASOPHILS # BLD: 0.1 K/UL (ref 0–0.2)
BASOPHILS NFR BLD: 0.6 %
BUN SERPL-MCNC: 30 MG/DL (ref 8–23)
CALCIUM SERPL-MCNC: 9.8 MG/DL (ref 8.5–9.9)
CHLORIDE SERPL-SCNC: 90 MEQ/L (ref 95–107)
CO2 SERPL-SCNC: 25 MEQ/L (ref 20–31)
CREAT SERPL-MCNC: 5.64 MG/DL (ref 0.5–0.9)
CRP SERPL HS-MCNC: 57.2 MG/L (ref 0–5)
EOSINOPHIL # BLD: 0.2 K/UL (ref 0–0.7)
EOSINOPHIL NFR BLD: 1.4 %
ERYTHROCYTE [DISTWIDTH] IN BLOOD BY AUTOMATED COUNT: 16.7 % (ref 11.5–14.5)
GLUCOSE BLD-MCNC: 184 MG/DL (ref 70–99)
GLUCOSE BLD-MCNC: 223 MG/DL (ref 70–99)
GLUCOSE BLD-MCNC: 263 MG/DL (ref 70–99)
GLUCOSE BLD-MCNC: 279 MG/DL (ref 70–99)
GLUCOSE SERPL-MCNC: 248 MG/DL (ref 70–99)
HCT VFR BLD AUTO: 31 % (ref 37–47)
HGB BLD-MCNC: 10.5 G/DL (ref 12–16)
LYMPHOCYTES # BLD: 0.7 K/UL (ref 1–4.8)
LYMPHOCYTES NFR BLD: 5.9 %
MCH RBC QN AUTO: 34.1 PG (ref 27–31.3)
MCHC RBC AUTO-ENTMCNC: 33.9 % (ref 33–37)
MCV RBC AUTO: 100.6 FL (ref 79.4–94.8)
MONOCYTES # BLD: 0.7 K/UL (ref 0.2–0.8)
MONOCYTES NFR BLD: 6.4 %
NEUTROPHILS # BLD: 9.8 K/UL (ref 1.4–6.5)
NEUTS SEG NFR BLD: 85.2 %
PERFORMED ON: ABNORMAL
PHOSPHATE SERPL-MCNC: 2.6 MG/DL (ref 2.3–4.8)
PLATELET # BLD AUTO: 160 K/UL (ref 130–400)
POTASSIUM SERPL-SCNC: 4.2 MEQ/L (ref 3.4–4.9)
PROCALCITONIN SERPL IA-MCNC: 0.62 NG/ML (ref 0–0.15)
RBC # BLD AUTO: 3.08 M/UL (ref 4.2–5.4)
SODIUM SERPL-SCNC: 134 MEQ/L (ref 135–144)
WBC # BLD AUTO: 11.5 K/UL (ref 4.8–10.8)

## 2024-04-11 PROCEDURE — 86140 C-REACTIVE PROTEIN: CPT

## 2024-04-11 PROCEDURE — 80069 RENAL FUNCTION PANEL: CPT

## 2024-04-11 PROCEDURE — 94640 AIRWAY INHALATION TREATMENT: CPT

## 2024-04-11 PROCEDURE — P9047 ALBUMIN (HUMAN), 25%, 50ML: HCPCS | Performed by: INTERNAL MEDICINE

## 2024-04-11 PROCEDURE — 6360000002 HC RX W HCPCS: Performed by: INTERNAL MEDICINE

## 2024-04-11 PROCEDURE — 99233 SBSQ HOSP IP/OBS HIGH 50: CPT | Performed by: INTERNAL MEDICINE

## 2024-04-11 PROCEDURE — 85025 COMPLETE CBC W/AUTO DIFF WBC: CPT

## 2024-04-11 PROCEDURE — 6370000000 HC RX 637 (ALT 250 FOR IP): Performed by: INTERNAL MEDICINE

## 2024-04-11 PROCEDURE — 97112 NEUROMUSCULAR REEDUCATION: CPT

## 2024-04-11 PROCEDURE — 99232 SBSQ HOSP IP/OBS MODERATE 35: CPT | Performed by: PSYCHIATRY & NEUROLOGY

## 2024-04-11 PROCEDURE — 99291 CRITICAL CARE FIRST HOUR: CPT | Performed by: INTERNAL MEDICINE

## 2024-04-11 PROCEDURE — 99232 SBSQ HOSP IP/OBS MODERATE 35: CPT | Performed by: INTERNAL MEDICINE

## 2024-04-11 PROCEDURE — 6370000000 HC RX 637 (ALT 250 FOR IP): Performed by: COLON & RECTAL SURGERY

## 2024-04-11 PROCEDURE — 6360000002 HC RX W HCPCS: Performed by: COLON & RECTAL SURGERY

## 2024-04-11 PROCEDURE — 2580000003 HC RX 258: Performed by: INTERNAL MEDICINE

## 2024-04-11 PROCEDURE — 94761 N-INVAS EAR/PLS OXIMETRY MLT: CPT

## 2024-04-11 PROCEDURE — 84145 PROCALCITONIN (PCT): CPT

## 2024-04-11 PROCEDURE — 36592 COLLECT BLOOD FROM PICC: CPT

## 2024-04-11 PROCEDURE — 2000000000 HC ICU R&B

## 2024-04-11 PROCEDURE — 2700000000 HC OXYGEN THERAPY PER DAY

## 2024-04-11 PROCEDURE — 2580000003 HC RX 258: Performed by: COLON & RECTAL SURGERY

## 2024-04-11 RX ORDER — WATER 10 ML/10ML
INJECTION INTRAMUSCULAR; INTRAVENOUS; SUBCUTANEOUS
Status: DISPENSED
Start: 2024-04-11 | End: 2024-04-11

## 2024-04-11 RX ADMIN — SODIUM CHLORIDE, PRESERVATIVE FREE 10 ML: 5 INJECTION INTRAVENOUS at 08:37

## 2024-04-11 RX ADMIN — INSULIN LISPRO 2 UNITS: 100 INJECTION, SOLUTION INTRAVENOUS; SUBCUTANEOUS at 16:52

## 2024-04-11 RX ADMIN — ARIPIPRAZOLE 5 MG: 5 TABLET ORAL at 08:21

## 2024-04-11 RX ADMIN — POLYETHYLENE GLYCOL 3350 17 G: 17 POWDER, FOR SOLUTION ORAL at 08:15

## 2024-04-11 RX ADMIN — SEVELAMER CARBONATE 800 MG: 800 TABLET, FILM COATED ORAL at 08:20

## 2024-04-11 RX ADMIN — MUPIROCIN: 20 OINTMENT TOPICAL at 21:11

## 2024-04-11 RX ADMIN — ATORVASTATIN CALCIUM 40 MG: 40 TABLET, FILM COATED ORAL at 21:11

## 2024-04-11 RX ADMIN — SODIUM CHLORIDE, PRESERVATIVE FREE 10 ML: 5 INJECTION INTRAVENOUS at 21:10

## 2024-04-11 RX ADMIN — APIXABAN 2.5 MG: 2.5 TABLET, FILM COATED ORAL at 08:21

## 2024-04-11 RX ADMIN — INSULIN LISPRO 4 UNITS: 100 INJECTION, SOLUTION INTRAVENOUS; SUBCUTANEOUS at 08:25

## 2024-04-11 RX ADMIN — SEVELAMER CARBONATE 800 MG: 800 TABLET, FILM COATED ORAL at 16:50

## 2024-04-11 RX ADMIN — INSULIN LISPRO 4 UNITS: 100 INJECTION, SOLUTION INTRAVENOUS; SUBCUTANEOUS at 12:07

## 2024-04-11 RX ADMIN — HYDROCORTISONE SODIUM SUCCINATE 50 MG: 100 INJECTION, POWDER, FOR SOLUTION INTRAMUSCULAR; INTRAVENOUS at 03:00

## 2024-04-11 RX ADMIN — DOCUSATE SODIUM 100 MG: 100 CAPSULE, LIQUID FILLED ORAL at 08:16

## 2024-04-11 RX ADMIN — SERTRALINE HYDROCHLORIDE 50 MG: 50 TABLET ORAL at 21:11

## 2024-04-11 RX ADMIN — ALBUTEROL SULFATE 2.5 MG: 2.5 SOLUTION RESPIRATORY (INHALATION) at 15:38

## 2024-04-11 RX ADMIN — CEFAZOLIN 2000 MG: 2 INJECTION, POWDER, FOR SOLUTION INTRAMUSCULAR; INTRAVENOUS at 03:05

## 2024-04-11 RX ADMIN — HYDROCORTISONE SODIUM SUCCINATE 50 MG: 100 INJECTION, POWDER, FOR SOLUTION INTRAMUSCULAR; INTRAVENOUS at 08:21

## 2024-04-11 RX ADMIN — CEFAZOLIN 2000 MG: 2 INJECTION, POWDER, FOR SOLUTION INTRAMUSCULAR; INTRAVENOUS at 16:52

## 2024-04-11 RX ADMIN — INSULIN LISPRO 4 UNITS: 100 INJECTION, SOLUTION INTRAVENOUS; SUBCUTANEOUS at 16:51

## 2024-04-11 RX ADMIN — APIXABAN 2.5 MG: 2.5 TABLET, FILM COATED ORAL at 21:11

## 2024-04-11 RX ADMIN — INSULIN GLARGINE 15 UNITS: 100 INJECTION, SOLUTION SUBCUTANEOUS at 21:11

## 2024-04-11 RX ADMIN — MICONAZOLE NITRATE: 2 POWDER TOPICAL at 08:17

## 2024-04-11 RX ADMIN — INSULIN GLARGINE 15 UNITS: 100 INJECTION, SOLUTION SUBCUTANEOUS at 08:24

## 2024-04-11 RX ADMIN — ALBUTEROL SULFATE 2.5 MG: 2.5 SOLUTION RESPIRATORY (INHALATION) at 03:37

## 2024-04-11 RX ADMIN — HYDROCORTISONE SODIUM SUCCINATE 50 MG: 100 INJECTION, POWDER, FOR SOLUTION INTRAMUSCULAR; INTRAVENOUS at 16:49

## 2024-04-11 RX ADMIN — HYDROCORTISONE SODIUM SUCCINATE 50 MG: 100 INJECTION, POWDER, FOR SOLUTION INTRAMUSCULAR; INTRAVENOUS at 21:10

## 2024-04-11 RX ADMIN — AMOXICILLIN 500 MG: 500 CAPSULE ORAL at 08:21

## 2024-04-11 RX ADMIN — MIDODRINE HYDROCHLORIDE 20 MG: 10 TABLET ORAL at 12:05

## 2024-04-11 RX ADMIN — ALBUMIN (HUMAN) 25 G: 0.25 INJECTION, SOLUTION INTRAVENOUS at 04:00

## 2024-04-11 RX ADMIN — MIDODRINE HYDROCHLORIDE 20 MG: 10 TABLET ORAL at 08:15

## 2024-04-11 RX ADMIN — SEVELAMER CARBONATE 800 MG: 800 TABLET, FILM COATED ORAL at 21:11

## 2024-04-11 RX ADMIN — MICONAZOLE NITRATE: 2 POWDER TOPICAL at 21:12

## 2024-04-11 RX ADMIN — MIDODRINE HYDROCHLORIDE 20 MG: 10 TABLET ORAL at 16:50

## 2024-04-11 RX ADMIN — PANTOPRAZOLE SODIUM 20 MG: 20 TABLET, DELAYED RELEASE ORAL at 08:21

## 2024-04-11 RX ADMIN — MUPIROCIN: 20 OINTMENT TOPICAL at 08:14

## 2024-04-11 RX ADMIN — HYDROXYZINE HYDROCHLORIDE 25 MG: 25 TABLET ORAL at 08:21

## 2024-04-11 RX ADMIN — DOCUSATE SODIUM 100 MG: 100 CAPSULE, LIQUID FILLED ORAL at 21:11

## 2024-04-11 RX ADMIN — HYDROCODONE BITARTRATE AND ACETAMINOPHEN 1 TABLET: 5; 325 TABLET ORAL at 13:38

## 2024-04-11 RX ADMIN — HYDROXYZINE HYDROCHLORIDE 25 MG: 25 TABLET ORAL at 21:11

## 2024-04-11 RX ADMIN — ASPIRIN 81 MG: 81 TABLET, COATED ORAL at 08:21

## 2024-04-11 RX ADMIN — MECLIZINE 25 MG: 12.5 TABLET ORAL at 08:21

## 2024-04-11 ASSESSMENT — PAIN SCALES - GENERAL
PAINLEVEL_OUTOF10: 0
PAINLEVEL_OUTOF10: 0
PAINLEVEL_OUTOF10: 4
PAINLEVEL_OUTOF10: 0

## 2024-04-11 ASSESSMENT — PAIN DESCRIPTION - LOCATION
LOCATION: CHEST
LOCATION: CHEST

## 2024-04-11 NOTE — PROGRESS NOTES
Physical Therapy Med Surg Daily Treatment Note  [place] [facility]  Room: [place] [address]       NAME: [place] Le  : 1960 (64 y.o.)  MRN: [id number]  CODE STATUS: Full Code    Date of Service: 2024    Patient Diagnosis(es): ESRD on dialysis (HCC) [N18.6, Z99.2]  Fluid overload [E87.70]  Acute decompensated heart failure (HCC) [I50.9]  Hypotension, unspecified hypotension type [I95.9]   Chief Complaint   Patient presents with    Shortness of Breath     Sob poss. Due to fluid overload     Patient Active Problem List    Diagnosis Date Noted    Unstable angina ([facility]) 2022    Chest pain     Infection of venous access port 2022    Lumbar stenosis with neurogenic claudication 07/15/2022    Falls infrequently 07/15/2022    Cervical stenosis of spinal canal     Ataxic gait     Chronic hepatitis C ([facility]) 07/10/2022    Catheter-related bloodstream infection     Enterococcus faecalis infection     Sepsis due to skin infection ([facility]) 2022    Sarcopenia 2022    Fall from standing     DJD (degenerative joint disease), cervical 2022    Closed head injury     MRSA bacteremia 2022    Sepsis due to Enterococcus ([facility])     Local infection due to central venous catheter     Impaired mobility and activities of daily living dt polyneuropathy and reccent fall  2022    Closed T11 fracture ([facility]) 2022    Encephalopathy acute 2022    Unspecified open wound of right upper arm, initial encounter 2022    Hyperkalemia 2022    CKD (chronic kidney disease) stage 4, GFR 15-29 ml/min ([facility]) 2022    Adrenal insufficiency ([facility]) 2024    Hypogammaglobulinemia ([facility]) 2024    Lack of intravenous access 2024    Pressure injury, unstageable, with suspected deep tissue injury ([facility]) 2024    Nonhealing nonsurgical wound 2024    Skin picking habit 2024    Hypotension 2024    ESRD on dialysis ([facility]) 2024    Acute decompensated  Inpatient Mobility Raw Score : 11     Therapy Time   Individual   Time In 1404   Time Out 1416   Minutes 12     Minutes: 12  Transfers: 5  Neuro re-ed: 5  Gait: 2          Ronna Reyna PTA, 04/11/24 at 2:58 PM         Definitions for assistance levels  Independent = pt does not require any physical supervision or assistance from another person for activity completion. Device may be needed.  Stand by assistance = pt requires verbal cues or instructions from another person, close to but not touching, to perform the activity  Minimal assistance= pt performs 75% or more of the activity; assistance is required to complete the activity  Moderate assistance= pt performs 50% of the activity; assistance is required to complete the activity  Maximal assistance = pt performs 25% of the activity; assistance is required to complete the activity  Dependent = pt requires total physical assistance to accomplish the task

## 2024-04-11 NOTE — PLAN OF CARE
Problem: Discharge Planning  Goal: Discharge to home or other facility with appropriate resources  Outcome: Progressing     Problem: Safety - Adult  Goal: Free from fall injury  Outcome: Progressing     Problem: Skin/Tissue Integrity  Goal: Absence of new skin breakdown  Description: 1.  Monitor for areas of redness and/or skin breakdown  2.  Assess vascular access sites hourly  3.  Every 4-6 hours minimum:  Change oxygen saturation probe site  4.  Every 4-6 hours:  If on nasal continuous positive airway pressure, respiratory therapy assess nares and determine need for appliance change or resting period.  Outcome: Progressing     Problem: Chronic Conditions and Co-morbidities  Goal: Patient's chronic conditions and co-morbidity symptoms are monitored and maintained or improved  Outcome: Progressing     Problem: Pain  Goal: Verbalizes/displays adequate comfort level or baseline comfort level  Outcome: Progressing     Problem: Respiratory - Adult  Goal: Achieves optimal ventilation and oxygenation  Outcome: Progressing     Problem: Cardiovascular - Adult  Goal: Maintains optimal cardiac output and hemodynamic stability  Outcome: Progressing  Goal: Absence of cardiac dysrhythmias or at baseline  Outcome: Progressing     Problem: Skin/Tissue Integrity - Adult  Goal: Skin integrity remains intact  Outcome: Progressing  Goal: Incisions, wounds, or drain sites healing without S/S of infection  Outcome: Progressing     Problem: Musculoskeletal - Adult  Goal: Return mobility to safest level of function  Outcome: Progressing     Problem: Gastrointestinal - Adult  Goal: Minimal or absence of nausea and vomiting  Outcome: Progressing  Goal: Maintains or returns to baseline bowel function  Outcome: Progressing  Goal: Maintains adequate nutritional intake  Outcome: Progressing     Problem: Genitourinary - Adult  Goal: Absence of urinary retention  Outcome: Progressing     Problem: Infection - Adult  Goal: Absence of infection  at discharge  Outcome: Progressing     Problem: Metabolic/Fluid and Electrolytes - Adult  Goal: Electrolytes maintained within normal limits  Outcome: Progressing  Goal: Hemodynamic stability and optimal renal function maintained  Outcome: Progressing  Goal: Glucose maintained within prescribed range  Outcome: Progressing     Problem: Hematologic - Adult  Goal: Maintains hematologic stability  Outcome: Progressing     Problem: Nutrition Deficit:  Goal: Optimize nutritional status  Outcome: Progressing     Problem: Physical Therapy - Adult  Goal: By Discharge: Performs mobility at highest level of function for planned discharge setting.  See evaluation for individualized goals.  4/10/2024 1318 by Therese Landry, PT  Outcome: Progressing     Problem: Occupational Therapy - Adult  Goal: By Discharge: Performs self-care activities at highest level of function for planned discharge setting.  See evaluation for individualized goals.  4/10/2024 1117 by Malou Cochran, OTR/L  Outcome: Progressing

## 2024-04-11 NOTE — CARE COORDINATION
Quality round completed with care management team. LSW met with pt at bedside. Discussed DC plan with pt. Pt does not want Hospice at this time. Agreed with [facility] at DC.   LSW messaged Abiodun at [facility]. Waiting to see if able to accept pt today if medically clear.     LSW will follow.     Electronically signed by HIPOLITO Rendon on 4/11/2024 at 10:23 AM    Per Abiodun, unable to accept pt until tomorrow, 4/12/2024.   Notify RN and pt and CM. Pending medical clearance.     LSW will follow.     Electronically signed by HIPOLITO Rendon on 4/11/2024 at 3:25 PM    Per RN, pt is medically cleared to DC tomorrow to [facility].   Transportation set [facility].  time 4/12/2024 @ 10:30AM.   Notify RN, pt, Abiodun, and CM.   Pt report she will call dtr and notify daughter regarding  time.     LSW will follow.     Electronically signed by HIPOLITO Rendon on 4/11/2024 at 4:34 PM

## 2024-04-11 NOTE — PROGRESS NOTES
Pulmonary & Critical Care Medicine ICU Progress Note  Chief complaint : Shock    Subjunctive/24 hour events :   Patient seen and examined during multidisciplinary rounds with RN, charge nurse, RT, pharmacy, dietitian, and social service.      Doing good, has no complaint, no coughing, no chest pain, no fever, she was off Levophed earlier today now back on Levophed.  No urine output, no nausea no vomiting, no abdominal pain.  She is on room air saturation 99%    New information updated in the note today, rest of the examination did not change compared to yesterday.  Social History     Tobacco Use    Smoking status: Never     Passive exposure: Never    Smokeless tobacco: Never   Substance Use Topics    Alcohol use: No     Alcohol/week: 0.0 standard drinks of alcohol         Problem Relation Age of Onset    Cancer Mother 68        survived    Breast Cancer Mother     Hypertension Father     Diabetes Sister     Mental Illness Sister     Colon Cancer Neg Hx        Recent Labs     04/09/24  0754   PHART 7.317*   SXH6QVA 37   PO2ART 272*         MV Settings:     / / /            IV:   norepinephrine 2 mcg/min (04/11/24 0620)    sodium chloride Stopped (03/30/24 1524)    dextrose         Vitals:  BP (!) 142/33   Pulse 88   Temp 97.3 °F (36.3 °C) (Oral)   Resp (!) 31   Ht 1.702 m (5' 7.01\")   Wt 96.6 kg (212 lb 15.4 oz)   LMP  (LMP Unknown)   SpO2 100%   BMI 33.35 kg/m²    Tmax:        Intake/Output Summary (Last 24 hours) at 4/11/2024 0833  Last data filed at 4/11/2024 0620  Gross per 24 hour   Intake 890.24 ml   Output 0 ml   Net 890.24 ml         EXAM:  General: alert, cooperative, no distress, slightly confused  Head: normocephalic, atraumatic  Eyes:No gross abnormalities.  ENT:  MMM no lesions  Neck:  supple and no masses  Chest : clear to auscultation bilaterally- no wheezes, rales or rhonchi, normal air movement, no respiratory distress  Heart:: Heart sounds are normal.  Regular rate and rhythm without murmur,  04/11/24  0539     --  139 134*   K 3.6  --  3.8 4.2   CL 92*  --  93* 90*   CO2 24  --  25 25   PHOS  --   --  2.9 2.6   BUN 33*  --  21 30*   CREATININE 6.30* 6.5* 4.68* 5.64*       LIVER PROFILE:   Recent Labs     04/09/24  0600   AST 10   ALT <5   BILITOT 0.9*   ALKPHOS 64       PT/INR: No results for input(s): \"PROTIME\", \"INR\" in the last 72 hours.  APTT: No results for input(s): \"APTT\" in the last 72 hours.  UA:No results for input(s): \"NITRITE\", \"COLORU\", \"PHUR\", \"LABCAST\", \"WBCUA\", \"RBCUA\", \"MUCUS\", \"TRICHOMONAS\", \"YEAST\", \"BACTERIA\", \"CLARITYU\", \"SPECGRAV\", \"LEUKOCYTESUR\", \"UROBILINOGEN\", \"BILIRUBINUR\", \"BLOODU\", \"GLUCOSEU\", \"AMORPHOUS\" in the last 72 hours.    Invalid input(s): \"KETONESU\"    Cultures:  Negative so far  Films:  CXR films reviewed by me and it showed bilateral pleural effusion    Arterial ultrasound shows no subclavian artery stenosis    Assessment:  This is a critically ill patient at risk of deterioration / death , needing close ICU monitoring and intervention due to below noted problems       Postcardiac arrest, brief, etiology is not clear  Lactic acidosis, postcardiac arrest  Shock etiology is not clear,?  Septic,?  Adrenal insufficiency, back on Levophed today  End-stage renal disease on dialysis  Volume overload  Pulmonary hypertension  Swelling right lower extremity, no DVT  A-fib on   Eliquis   Insomnia  Constipation      Recommendation  Levophed to maintain mean arterial pressure 65-70  DC albumin  Continue midodrine  Continue Solu-Cortef, plan per endocrinology  Monitor blood pressure and lower extremities  Continue Eliquis   Target blood sugar 140-180   Continue laxatives  Ambien as needed         Due to the immediate potential for life-threatening deterioration due to shock, I spent 35 minutes providing critical care.  This time is excluding time spent performing procedures.          Electronically signed by Say Marsh MD,  FCCP ,on 4/11/2024 at 8:33 AM

## 2024-04-11 NOTE — PROGRESS NOTES
Cardiology progress note    Patient Name: Michelle Le  Admit Date: 3/29/2024 10:23 AM  MR #: [id number]  : 1960    Attending Physician: Werner Ortiz MD  Reason for consult: Hypotension    History of Presenting Illness:      Michelle Le is a 64 y.o. female on hospital day 13 with a history of CAD prior PCI to the LAD, CABG LIMA to LAD and tricuspid valve repair complicated by tamponade and pericardial window, end-stage renal disease on hemodialysis, hypertension, hyperlipidemia, atrial fibrillation on Eliquis admitted to the hospital for shortness of breath. History Obtained From:  patient, electronic medical record    Patient hyperkalemic on admission 6.4  EKG atrial flutter 58 bpm  Troponins flat 130  =================  Hospital course  2024  Patient laying in bed sleeping  About to undergo dialysis  Per nursing staff Levophed was restarted as maps went down to the 50s  Currently on low-dose Levophed  Telemetry sinus rhythm    4/10/2024  Patient laying in bed  Reports feeling better smiling  [employer] members at bedside  Patient is now off Levophed  Patient on Solu-Cortef    2024  Patient laying in bed looks comfortable  Undergoing dialysis  This morning patient was noted unresponsive and having a seizure-like activity, no pulse was found.  Patient underwent CPR ROSC achieved after 1 round of CPR  Currently also undergoing echocardiogram which roughly looks with preserved ejection fraction  On Levophed    2024  Patient laying in bed looks comfortable  Still on low-dose Levophed  Telemetry sinus rhythm        3/31/2024  Patient now in ICU due to hypotension  Currently patient on Levophed  Patient looks comfortable denies chest pain or shortness of breath  Still hyperkalemic but condition improving    2024: Patient is resting comfortably in bed and appears to be in no acute distress at this time.  She is on room air.  She is talking to me comfortably without any distress.  Patient  any chest pain.    4-7-24: on levophed 5mcg. NSR on tele. No CP or SOB.     History:      Past Medical History:   Diagnosis Date    Angina at rest ([facility]) 5/24/2020    Anxiety     CAD S/P percutaneous coronary angioplasty 2015, 2018    stents per Huong Sanabria    CHF (congestive heart failure) ([facility])     CKD (chronic kidney disease) stage 4, GFR 15-29 ml/min ([facility]) 2/24/2018    CKD stage 4 due to type 2 diabetes mellitus ([facility])     Contusion of right chest wall 2/16/2021    COPD (chronic obstructive pulmonary disease) ([facility])     Diabetic nephropathy with proteinuria ([facility]) 2014    DJD (degenerative joint disease) of knee     Dr Sharma    GERD (gastroesophageal reflux disease)     Hemiparesis, left ([facility]) 2013    entered Assisted Living ([facility])    Hemodialysis patient ([facility])     Hemodialysis-associated hypotension 10/22/2021    History of heart failure     History of seizures     History of type C viral hepatitis     HTN (hypertension)     Hyperlipidemia     Impaired mobility and activities of daily living     Infection of venous access port 7/29/2022    Mediastinal lymphadenopathy 2013    Sangeeta Cummings    Metabolic syndrome     Moderate persistent asthma without complication 9/23/2020    Need for extended care facility 7/7/2021    Neurogenic urinary incontinence 2013    Neuropathy in diabetes ([facility])     Nonrheumatic mitral valve regurgitation 7/7/2021    Nonrheumatic tricuspid valve regurgitation 7/7/2021    Obesity (BMI 30-39.9)     Recurrent UTI     S/P colonoscopy 2014    [facility], focal active colitis    Schizophrenia, paranoid, chronic ([facility])     [facility]    Small vessel disease, cerebrovascular 2013    Status post total knee replacement, right     Status post total left knee replacement 6/21/2018    Thrush 12/24/2020    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

## 2024-04-11 NOTE — PROGRESS NOTES
Hospitalist Progress Note      PCP: Adilene Terry MD    Date of Admission: 3/29/2024    Chief Complaint:  no acute events, is afebrile, still hypotensive requiring Norepinephrine infusion    Medications:  Reviewed    Infusion Medications    norepinephrine 2 mcg/min (04/11/24 0836)    sodium chloride Stopped (03/30/24 1524)    dextrose       Scheduled Medications    sterile water        hydrocortisone sodium succinate PF  50 mg IntraVENous Q6H    insulin lispro  0-8 Units SubCUTAneous TID WC    insulin lispro  0-4 Units SubCUTAneous Nightly    insulin glargine  15 Units SubCUTAneous BID    insulin lispro  4 Units SubCUTAneous TID WC    docusate sodium  100 mg Oral BID    polyethylene glycol  17 g Oral Daily    meclizine  25 mg Oral Daily    amoxicillin  500 mg Oral Daily    midodrine  20 mg Oral TID WC    mupirocin   Topical BID    ceFAZolin  2,000 mg IntraVENous Q12H    hydrOXYzine HCl  25 mg Oral BID    sodium chloride flush  5-40 mL IntraVENous 2 times per day    ARIPiprazole  5 mg Oral Daily    atorvastatin  40 mg Oral Nightly    pantoprazole  20 mg Oral Daily    sevelamer  800 mg Oral TID    apixaban  2.5 mg Oral BID    aspirin EC  81 mg Oral Daily    sertraline  50 mg Oral Nightly    miconazole   Topical BID    albuterol  2.5 mg Nebulization BID RT     PRN Meds: sterile water, perflutren lipid microspheres, zolpidem, HYDROcodone 5 mg - acetaminophen **OR** HYDROcodone 5 mg - acetaminophen, sodium chloride flush, sodium chloride, ondansetron **OR** ondansetron, acetaminophen **OR** acetaminophen, albumin human 25%, glucose, dextrose bolus **OR** dextrose bolus, glucagon (rDNA), dextrose, albuterol      Intake/Output Summary (Last 24 hours) at 4/11/2024 0940  Last data filed at 4/11/2024 0620  Gross per 24 hour   Intake 890.24 ml   Output 0 ml   Net 890.24 ml         Exam:    BP (!) 142/33   Pulse 88   Temp 97.3 °F (36.3 °C) (Oral)   Resp (!) 31   Ht 1.702 m (5' 7.01\")   Wt 96.6 kg (212 lb 15.4 oz)   LMP    2. Diminished flow inferior to the failed right-sided fistula.   3. Patent subclavian arteries with good flow.         XR CHEST PORTABLE   Final Result   Interval placement of a right-sided Kenyon catheter with the tip at the   cavoatrial junction. No pneumothorax.         FLUORO FOR SURGICAL PROCEDURES   Final Result   Intraprocedural fluoroscopic spot images as above.  See separate procedure   report for more information.         XR ABDOMEN (KUB) (SINGLE AP VIEW)   Final Result   No evidence of bowel obstruction.      Colonic fecal retention.         Vascular duplex lower extremity venous right   Final Result   No evidence of DVT in the right lower extremity.         XR CHEST PORTABLE   Final Result   The right lung left upper lobe are clear      The left lung base is poorly visualized due to the cardiomegaly and overlying   left breast tissue as well as secondary to rotation.      There is no appreciable right or left pleural effusion.                 Assessment/Plan:    64 year-old female with history of CAD s/p CABG/PCI, s/p TV repair, PAF/AFL, CVA with left sided weakness, IDDM2, ESRD on HD, anemia, schizophrenia, COVID-19 pneumonia, obesity, T11 fracture, hypogammaglobulinemia, E.Faecalis BSI in 7/2022, chronic left knee PJI/OM on chronic suppressive therapy with Amoxicillin who presented with :    Cardiac arrest s/p ROSC  - patient was experiencing generalized limb movement concerning for seizure prior to the cardiac arrest,   - ROSC obtained after 1 round of CPR/Epi,   - IV Keppra x 1 given,   - CT head negative for acute findings  - CTA of the chest negative for PE  - TTE showed  LVEF of 60 - 65%, DD, dilated RV, RVSP- 62 mmHg.   - followed by cardiology and neurology      Acute / chronic hypotension  - possibly related to septic shock, due to left hand cellulitis/LUE infected ulcer and adrenal insufficiency  - requiring Norepinephrine, Solucortef,Albumin,Midodrine, Florinef  - on IV Cefazolin and

## 2024-04-11 NOTE — PROGRESS NOTES
PHARMACY NOTE:   ICU Rounds Attended (10-15 minutes in patient room):    Pt diagnosis: fluid overload    Follow up/Changes:    -home meds reviewed/ reordered  -monitor blood glucose and need for increased SSI.  -dc albumin per verbal    NOTES:    Antimicrobial Therapy: Day 11/14: ancef. No Cultures. ID on consult. Amoxicillin daily for chronic   Pressors: norepinephrine @ 2  Steroid: Solu cortef 50mg q6h  Insulin Therapy (goal: 140-180): medium SSI 14 units in past 24 hours. Lantus 15 units BID  Stress Ulcer Prophylaxis: Pantoprazole  DVT Prophylaxis/Anticoagulant Therapy: eliquis   Core measures assessed/met.       Leti Freeman RPH PharmD

## 2024-04-11 NOTE — PROGRESS NOTES
Neurology Follow up    SUBJECTIVE: Patient actually asking about her CAT scan and other tests.  She remained stable overnight no seizure activity noted.  CT scan reviewed and is not sugeest  of any acute findings EEG is essentially normal.    4/11  Patient actually doing much better today and aware of this happenings and surroundings and follows all one-step commands.  She is undergoing dialysis    Current Facility-Administered Medications   Medication Dose Route Frequency Provider Last Rate Last Admin    sterile water injection             hydrocortisone sodium succinate PF (SOLU-CORTEF) injection 50 mg  50 mg IntraVENous Q6H Say Marsh MD   50 mg at 04/11/24 0821    insulin lispro (HUMALOG) injection vial 0-8 Units  0-8 Units SubCUTAneous TID Andrei Almonte MD   4 Units at 04/11/24 0825    insulin lispro (HUMALOG) injection vial 0-4 Units  0-4 Units SubCUTAneous Nightly Andrei Nino MD        insulin glargine (LANTUS) injection vial 15 Units  15 Units SubCUTAneous BID Andrei Nino MD   15 Units at 04/11/24 0824    insulin lispro (HUMALOG) injection vial 4 Units  4 Units SubCUTAneous TID Andrei Almonte MD   4 Units at 04/11/24 0825    perflutren lipid microspheres (DEFINITY) injection 1.5 mL  1.5 mL IntraVENous ONCE PRN Milagros Hanna, LORETO - CNP        docusate sodium (COLACE) capsule 100 mg  100 mg Oral BID Say Marsh MD   100 mg at 04/11/24 0816    polyethylene glycol (GLYCOLAX) packet 17 g  17 g Oral Daily Say Marsh MD   17 g at 04/11/24 0815    zolpidem (AMBIEN) tablet 2.5 mg  2.5 mg Oral Nightly PRN Say Marsh MD        meclizine (ANTIVERT) tablet 25 mg  25 mg Oral Daily Say Marsh MD   25 mg at 04/11/24 0821    amoxicillin (AMOXIL) capsule 500 mg  500 mg Oral Daily Autumn Rojo MD   500 mg at 04/11/24 0821    HYDROcodone-acetaminophen (NORCO) 5-325 MG per tablet 1 tablet  1 tablet Oral Q4H PRN Joss Denise MD   1 tablet at 04/09/24 1401    Or

## 2024-04-11 NOTE — INTERDISCIPLINARY ROUNDS
Spiritual Care Services     Summary of Visit:   participated in ICU rounds. Patient going through dialysis this morning. Patient to possibly go to LTAC later today.    Encounter Summary  Encounter Overview/Reason : Interdisciplinary rounds  Service Provided For:: Patient  Referral/Consult From:: Multi-disciplinary team  Support System: Children, Family members  Last Encounter : 04/05/24  Complexity of Encounter: High  Begin Time: 0745  End Time : 0830  Total Time Calculated: 45 min     Crisis  Type: Code Blue  Spiritual/Emotional needs  Type: Spiritual Support     Grief, Loss, and Adjustments  Type: Life Adjustments                Spiritual Assessment/Intervention/Outcomes:    Assessment: Anxious, Shock, Tearful    Intervention: Nurtured Hope, Prayer (assurance of)/[place], Sustaining Presence/Ministry of presence    Outcome: Concerns relieved, Coping, Less anxious, Less agitated      Care Plan:    Plan and Referrals  Plan/Referrals: Continue to visit, (comment)     will provide additional spiritual support as needed or requested      Spiritual Care Services   Electronically signed by Chaplain Pearl on 4/11/2024 at 10:16 AM.    To reach a  for emotional and spiritual support, place an EPIC consult request.   If a  is needed immediately, dial “0” and ask to page the on-call .

## 2024-04-11 NOTE — PROGRESS NOTES
Physical Therapy Missed Treatment   Facility/Department: [facility] MED SURG IC05/IC05-01    NAME: Michelle Le    : 1960 (64 y.o.)  MRN: [id number]    Account: [id number]  Gender: female    Chart reviewed, attempted PT at 1106. Patient unavailable 2° to:    [] Hold per nsg request    [] Pt declined    [] Nsg notified   [] Other notified    [] Pt.. off floor for test/procedure.     [x] Pt. Unavailable pt getting in room dialysis treatment.       Will attempt PT treatment again at earliest convenience.      Electronically signed by Yovani Cage PTA on 24 at 11:27 AM EDT

## 2024-04-11 NOTE — PROGRESS NOTES
Patient restarted on low dose of levophed for MAP >60. Patient had dialysis today, tolerated well, 3L total fluid removed.     Patient continues to have episodes of picking at her skin, she picked creating an open area to her left thumb, Wound cleansed and dry bandage applied.    Patient worked with PT/OT today, was able to get up to chair +2 assist. Patient sat up in chair for about 4 1/2 hours before returning to bed.     [facility]. Consulted physicians messaged and were amenable to transfer. Patient tentatively scheduled for 1030 am pick-up Friday.

## 2024-04-11 NOTE — DIALYSIS
Patient tolerated treatment well and without complications noted. Patient was given medications to maintain adequate BP. AVG functioned without issue. Patient achieved hemostasis within 10 minutes post treatment. Primary RN given report.    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  Patient noted to have NSTEMI and CHB    8/15/22: Seen at bedside. s/p LHC with non obstructive CAD, TVP placed during procedure    8/16/22:  s/p Bi-V PPM implant, removal of TVP, POD #1. Pt seen lying in bed in NAD.  She c/o diaphragmatic stimulation overnight.  TELE: sinus with biventricular pacing rates 70-90 bpm  EKG: a-sensed, v-paced 74 bpm, OH 166ms, QTc 512ms        MEDICATIONS  (STANDING):  acyclovir   Oral Tab/Cap 400 milliGRAM(s) Oral two times a day  aspirin enteric coated 81 milliGRAM(s) Oral daily  atorvastatin 40 milliGRAM(s) Oral at bedtime  calcium carbonate    500 mG (Tums) Chewable 2 Tablet(s) Chew at bedtime  ferrous    sulfate 325 milliGRAM(s) Oral daily  furosemide   Injectable 20 milliGRAM(s) IV Push two times a day  levothyroxine 100 MICROGram(s) Oral daily  sacubitril 24 mG/valsartan 26 mG 1 Tablet(s) Oral two times a day    MEDICATIONS  (PRN):  acetaminophen     Tablet .. 650 milliGRAM(s) Oral every 6 hours PRN Temp greater or equal to 38C (100.4F), Mild Pain (1 - 3)  ALBUTerol    90 MICROgram(s) HFA Inhaler 2 Puff(s) Inhalation every 6 hours PRN Shortness of Breath and/or Wheezing  aluminum hydroxide/magnesium hydroxide/simethicone Suspension 30 milliLiter(s) Oral every 4 hours PRN Dyspepsia  ondansetron Injectable 4 milliGRAM(s) IV Push every 8 hours PRN Nausea and/or Vomiting    Allergies    No Known Allergies    Intolerances    REVIEW OF SYSTEMS:    CONSTITUTIONAL: No weakness, fevers or chills.  Mild incisional discomfort at left upper chest/shoulder.  EYES/ENT: No visual changes;  No vertigo or throat pain   NECK: No pain or stiffness  RESPIRATORY: No cough, wheezing, hemoptysis; No shortness of breath  CARDIOVASCULAR: No chest pain or palpitations  GASTROINTESTINAL: No abdominal or epigastric pain. No nausea, vomiting, or hematemesis; No diarrhea or constipation. No melena or hematochezia.  GENITOURINARY: No dysuria, frequency or hematuria  NEUROLOGICAL: No numbness or weakness  SKIN: No itching, burning, rashes, or lesions   All other review of systems is negative unless indicated above                          10.2   9.09  )-----------( 195      ( 17 Aug 2022 05:59 )             30.9     08-17    146<H>  |  113<H>  |  43<H>  ----------------------------<  120<H>  3.9   |  27  |  0.85    Ca    9.2      17 Aug 2022 05:59  Phos  2.6     08-16  Mg     2.4     08-16    Serum Pro-Brain Natriuretic Peptide: 42903 pg/mL (08-17-22 @ 05:59)    PHYSICAL EXAM:    General: WN/WD NAD  Neurology: A&Ox3, nonfocal, DAMON x 4  HEENT: NC/AT, neck supple, no JVD, EOMI, PERRL  Respiratory: diminished at bases B/L  CV: RRR, S1S2, + systolic murmur, Gr II/VI, no rubs or gallops  Abdominal: Soft, NT, ND +BS  Extremities: No edema, + peripheral pulses.  Right groin soft, no hematoma, no drainage.  Skin: No rashes.  Left chest wall site C/D/I with Prineo dressing, ecchymosis to left axilla, no hematoma, soft.        < from: TTE Echo Complete w/o Contrast w/ Doppler (08.15.22 @ 08:12) >   Summary     The mitral valve leaflets appear thickened.   Moderate (2+) mitral regurgitation is present.   The aortic valve appears mildly calcified. Valve opening seems to be   restricted.   There are fibrocalcific changes noted to the aortic valve leaflets with   restriction in leaflet excursion. Transaortic gradients are   underestimated due to impaired left ventricle systolic function. Mild to   moderate aortic stenosis is present.   Mild aortic insufficiency noted.   The tricuspid valve leaflets appear mildly thickened and/or calcified,   but   open well.   moderate to severe (3+) eccentric tricuspid valve regurgitation is   present.   Severe pulmonary hypertension.   Normal appearing pulmonic valve structure.   Mild pulmonic valvular regurgitation (1+) is present.   The left atrium is mildly dilated.   Severe hypokinesis of the anteroseptal and inferoseptal walls.   Hypokinesis of the anterior wall is noted.   The apex is severely hypokinetic withsparing of the inferior wall.   Estimated left ventricular ejection fraction is about 40 %.   The right atrium appears mildly dilated.   The right ventricle is mildly dilated.   IVC is dilated and not collapsing with inspiration.    < from: Cardiac Catheterization (08.15.22 @ 10:08) >  Diagnostic Conclusions     Non-obstructive coronary artery disease. Pulmonary hypertension with   likely group 2 physiology given elevated PCWP. Remains in CHB requiring   TVP with threshold of 4.     Recommendations     Recommend aggressive medical management of non-obstructive CAD as per   ACC/AHA guidelines. Maintain TVP at 60 with threshold of 80 for back up   pacing given asymptomatic and no hemodynamic compromise. EP to evaluate   patient for durable PPM.    < from: CT Angio Chest PE Protocol w/ IV Cont (08.13.22 @ 23:53) >  IMPRESSION:    No CT evidence of pulmonary embolism.    Findings suggest pulmonary edema with small bilateral pleural effusions;   correlate clinically for superimposed infection.    Ill-defined subcarinal adenopathy probably related to history of lymphoma.

## 2024-04-11 NOTE — PROGRESS NOTES
--  139 134*   K 3.6  --  3.8 4.2   CL 92*  --  93* 90*   CO2 24  --  25 25   BUN 33*  --  21 30*   CREATININE 6.30* 6.5* 4.68* 5.64*   GLUCOSE 82  --  211* 248*   CALCIUM 9.5  --  9.6 9.8   LABGLOM 6.8* 7* 9.7* 7.8*       Troponin: No results for input(s): \"TROPONINI\" in the last 72 hours.  BNP: No results for input(s): \"BNP\" in the last 72 hours.  INR: No results for input(s): \"INR\" in the last 72 hours.  Lipids: No results for input(s): \"CHOL\", \"LDLDIRECT\", \"TRIG\", \"HDL\", \"AMYLASE\", \"LIPASE\" in the last 72 hours.  Liver:   Recent Labs     04/09/24  0600 04/10/24  0531 04/11/24  0539   AST 10  --   --    ALT <5  --   --    ALKPHOS 64  --   --    PROT 7.2  --   --    LABALBU 4.8*   < > 5.2*   BILITOT 0.9*  --   --     < > = values in this interval not displayed.       Iron:  No results for input(s): \"IRONS\", \"FERRITIN\" in the last 72 hours.    Invalid input(s): \"LABIRONS\"  Urinalysis: No results for input(s): \"UA\" in the last 72 hours.    Objective:   Vitals: BP (!) 142/33   Pulse 88   Temp 97.3 °F (36.3 °C) (Oral)   Resp (!) 31   Ht 1.702 m (5' 7.01\")   Wt 96.6 kg (212 lb 15.4 oz)   LMP  (LMP Unknown)   SpO2 100%   BMI 33.35 kg/m²    Wt Readings from Last 3 Encounters:   04/09/24 96.6 kg (212 lb 15.4 oz)   03/09/24 99.2 kg (218 lb 11.1 oz)   02/26/24 98 kg (216 lb)      24HR INTAKE/OUTPUT:    Intake/Output Summary (Last 24 hours) at 4/11/2024 1045  Last data filed at 4/11/2024 0620  Gross per 24 hour   Intake 890.24 ml   Output 0 ml   Net 890.24 ml       Admission weight: 107 kg (236 lb)     General: alert, in no apparent distress  Lungs: non-labored respirations, clear to auscultation bilaterally  Heart: regular rate and rhythm, no murmurs or rubs  Abdomen: soft, non-tender, non-distended  Ext: no cyanosis, ++ peripheral edema  Neuro: alert, following commands   Skin: scattered ecchymoses on extremities       Electronically signed by Abel Larios MD on 4/11/2024 at 10:45 AM

## 2024-04-11 NOTE — PROGRESS NOTES
Comprehensive Nutrition Assessment    Type and Reason for Visit:  Reassess    Nutrition Recommendations/Plan:   Continue Carb Control 4 diet      Malnutrition Assessment:  Malnutrition Status:  No malnutrition (04/05/24 1506)      Nutrition Assessment:    Nutrition status remains stable at this time, po intakes have been adequate and tolerated.  Will continue to follow    Nutrition Related Findings:    PMHx of schizophrenia, CAD ,hyperlipidemia, GERD, obesity, neuropathy, COPD, type II DM, ESRD on dialysis (Tuesday, Thursday, Saturday), CHF presents to ED via EMS from [facility] for evaluation of shortness of breath. Patient reports that she has become increasingly short of breath over the last week. Patient reports that the NH is concerned that she may be volume overloaded. Patient reports that she has been compliant with her dialysis schedule, however reports that secondary to hypotension they are unable to remove large volumes of fluid... per nephrologyHer dry weight is 97 kg and has been leaving around 105 kg\" anuric., + BM's , no GI & or nutrition related complaints, labs noted meds reviewed, currently on levophed, mild-moderate generalized edema present Wound Type: Deep Tissue Injury, Surgical Incision (coccxy)       Current Nutrition Intake & Therapies:    Average Meal Intake: %  Average Supplements Intake: None Ordered  ADULT DIET; Regular; 4 carb choices (60 gm/meal)    Anthropometric Measures:  Height: 170.2 cm (5' 7.01\")  Ideal Body Weight (IBW): 135 lbs (61 kg)    Admission Body Weight: 107 kg (236 lb)  Current Body Weight: 96.2 kg (212 lb) (4/9, fluid shifts with HD), 165.2 % Weight Source: Bed Scale  Current BMI (kg/m2): 33.2  Usual Body Weight: 90.3 kg (199 lb) (( 4/2023), 195# -2022 ** 213# dry wt per nephro)  % Weight Change (Calculated): 12.1                         Estimated Daily Nutrient Needs:  Energy Requirements Based On: Kcal/kg  Weight Used for Energy Requirements: Current  Energy  (kcal/day): 4387-1785 kcals @ 15-18 kcal/kg  Weight Used for Protein Requirements: Ideal  Protein (g/day): 73-85 g protein @ 1.2-1.4 g/kg IBW     Fluid (ml/day): ~1200 ml per day or as per nephrology    Nutrition Diagnosis:   Altered nutrition-related lab values related to endocrine dysfuntion, renal dysfunction as evidenced by lab values    Nutrition Interventions:   Food and/or Nutrient Delivery: Continue Current Diet (Continue Carb Control 4 diet)  Nutrition Education/Counseling: Education not indicated (NH resident)  Coordination of Nutrition Care: Continue to monitor while inpatient       Goals:  Previous Goal Met: Goal(s) Achieved  Goals: PO intake 75% or greater, by next RD assessment       Nutrition Monitoring and Evaluation:   Behavioral-Environmental Outcomes: None Identified  Food/Nutrient Intake Outcomes: Food and Nutrient Intake  Physical Signs/Symptoms Outcomes: Biochemical Data, Meal Time Behavior, Fluid Status or Edema, Weight, Skin    Discharge Planning:    Continue current diet     Kalyn Baker, RD, LD

## 2024-04-12 ENCOUNTER — HOSPITAL ENCOUNTER (OUTPATIENT)
Facility: CLINIC | Age: 64
Discharge: ANOTHER HOSPITAL | End: 2024-04-16

## 2024-04-12 VITALS
OXYGEN SATURATION: 96 % | TEMPERATURE: 97.6 F | DIASTOLIC BLOOD PRESSURE: 74 MMHG | HEART RATE: 83 BPM | RESPIRATION RATE: 23 BRPM | HEIGHT: 67 IN | BODY MASS INDEX: 33.43 KG/M2 | WEIGHT: 212.96 LBS | SYSTOLIC BLOOD PRESSURE: 98 MMHG

## 2024-04-12 VITALS — WEIGHT: 216.27 LBS | BODY MASS INDEX: 33.87 KG/M2

## 2024-04-12 LAB
ALBUMIN SERPL-MCNC: 5 G/DL (ref 3.5–4.6)
ANION GAP SERPL CALCULATED.3IONS-SCNC: 18 MEQ/L (ref 9–15)
BASOPHILS # BLD: 0 K/UL (ref 0–0.2)
BASOPHILS NFR BLD: 0.2 %
BUN SERPL-MCNC: 22 MG/DL (ref 8–23)
CALCIUM SERPL-MCNC: 9.9 MG/DL (ref 8.5–9.9)
CHLORIDE SERPL-SCNC: 89 MEQ/L (ref 95–107)
CO2 SERPL-SCNC: 26 MEQ/L (ref 20–31)
CREAT SERPL-MCNC: 4.25 MG/DL (ref 0.5–0.9)
EOSINOPHIL # BLD: 0 K/UL (ref 0–0.7)
EOSINOPHIL NFR BLD: 0 %
ERYTHROCYTE [DISTWIDTH] IN BLOOD BY AUTOMATED COUNT: 16.9 % (ref 11.5–14.5)
GLUCOSE SERPL-MCNC: 352 MG/DL (ref 70–99)
HCT VFR BLD AUTO: 32.7 % (ref 37–47)
HGB BLD-MCNC: 10.8 G/DL (ref 12–16)
LYMPHOCYTES # BLD: 0.7 K/UL (ref 1–4.8)
LYMPHOCYTES NFR BLD: 5.3 %
MCH RBC QN AUTO: 33.2 PG (ref 27–31.3)
MCHC RBC AUTO-ENTMCNC: 33 % (ref 33–37)
MCV RBC AUTO: 100.6 FL (ref 79.4–94.8)
MONOCYTES # BLD: 0.9 K/UL (ref 0.2–0.8)
MONOCYTES NFR BLD: 6.5 %
NEUTROPHILS # BLD: 11.8 K/UL (ref 1.4–6.5)
NEUTS SEG NFR BLD: 87.3 %
PHOSPHATE SERPL-MCNC: 2.1 MG/DL (ref 2.3–4.8)
PLATELET # BLD AUTO: 204 K/UL (ref 130–400)
POTASSIUM SERPL-SCNC: 3.9 MEQ/L (ref 3.4–4.9)
RBC # BLD AUTO: 3.25 M/UL (ref 4.2–5.4)
SODIUM SERPL-SCNC: 133 MEQ/L (ref 135–144)
WBC # BLD AUTO: 13.5 K/UL (ref 4.8–10.8)

## 2024-04-12 PROCEDURE — 6370000000 HC RX 637 (ALT 250 FOR IP): Performed by: COLON & RECTAL SURGERY

## 2024-04-12 PROCEDURE — 99232 SBSQ HOSP IP/OBS MODERATE 35: CPT | Performed by: INTERNAL MEDICINE

## 2024-04-12 PROCEDURE — 6360000002 HC RX W HCPCS: Performed by: INTERNAL MEDICINE

## 2024-04-12 PROCEDURE — 80069 RENAL FUNCTION PANEL: CPT

## 2024-04-12 PROCEDURE — 2580000003 HC RX 258: Performed by: INTERNAL MEDICINE

## 2024-04-12 PROCEDURE — 6370000000 HC RX 637 (ALT 250 FOR IP): Performed by: INTERNAL MEDICINE

## 2024-04-12 PROCEDURE — 99291 CRITICAL CARE FIRST HOUR: CPT | Performed by: INTERNAL MEDICINE

## 2024-04-12 PROCEDURE — 85025 COMPLETE CBC W/AUTO DIFF WBC: CPT

## 2024-04-12 PROCEDURE — 94761 N-INVAS EAR/PLS OXIMETRY MLT: CPT

## 2024-04-12 PROCEDURE — 94640 AIRWAY INHALATION TREATMENT: CPT

## 2024-04-12 PROCEDURE — 2700000000 HC OXYGEN THERAPY PER DAY

## 2024-04-12 PROCEDURE — 6360000002 HC RX W HCPCS: Performed by: COLON & RECTAL SURGERY

## 2024-04-12 RX ORDER — INSULIN GLARGINE 100 [IU]/ML
20 INJECTION, SOLUTION SUBCUTANEOUS 2 TIMES DAILY
Status: DISCONTINUED | OUTPATIENT
Start: 2024-04-12 | End: 2024-04-12 | Stop reason: HOSPADM

## 2024-04-12 RX ORDER — NOREPINEPHRINE BITARTRATE/D5W 8 MG/250ML
0-2 PLASTIC BAG, INJECTION (ML) INTRAVENOUS CONTINUOUS
Status: DISCONTINUED | OUTPATIENT
Start: 2024-04-12 | End: 2024-04-12

## 2024-04-12 RX ORDER — NAPROXEN SODIUM 220 MG/1
81 TABLET, FILM COATED ORAL DAILY
Status: DISCONTINUED | OUTPATIENT
Start: 2024-04-12 | End: 2024-04-17 | Stop reason: HOSPADM

## 2024-04-12 RX ORDER — ATROPINE SULFATE 0.1 MG/ML
0.5 INJECTION INTRAVENOUS ONCE AS NEEDED
Status: DISCONTINUED | OUTPATIENT
Start: 2024-04-12 | End: 2024-04-17 | Stop reason: HOSPADM

## 2024-04-12 RX ORDER — ONDANSETRON 4 MG/1
4 TABLET, FILM COATED ORAL EVERY 6 HOURS PRN
Status: DISCONTINUED | OUTPATIENT
Start: 2024-04-12 | End: 2024-04-14

## 2024-04-12 RX ORDER — POLYETHYLENE GLYCOL 3350 17 G/17G
17 POWDER, FOR SOLUTION ORAL DAILY
Status: DISCONTINUED | OUTPATIENT
Start: 2024-04-12 | End: 2024-04-17 | Stop reason: HOSPADM

## 2024-04-12 RX ORDER — BISACODYL 10 MG/1
10 SUPPOSITORY RECTAL DAILY PRN
Status: DISCONTINUED | OUTPATIENT
Start: 2024-04-12 | End: 2024-04-17 | Stop reason: HOSPADM

## 2024-04-12 RX ORDER — HYDROXYZINE HYDROCHLORIDE 25 MG/1
25 TABLET, FILM COATED ORAL 2 TIMES DAILY
Status: DISCONTINUED | OUTPATIENT
Start: 2024-04-12 | End: 2024-04-17 | Stop reason: HOSPADM

## 2024-04-12 RX ORDER — MIDODRINE HYDROCHLORIDE 5 MG/1
5 TABLET ORAL
Status: DISCONTINUED | OUTPATIENT
Start: 2024-04-12 | End: 2024-04-13

## 2024-04-12 RX ORDER — ACETAMINOPHEN 650 MG/1
650 SUPPOSITORY RECTAL EVERY 4 HOURS PRN
Status: DISCONTINUED | OUTPATIENT
Start: 2024-04-12 | End: 2024-04-17 | Stop reason: HOSPADM

## 2024-04-12 RX ORDER — DIPHENHYDRAMINE HCL 25 MG
25 CAPSULE ORAL EVERY 4 HOURS PRN
Status: DISCONTINUED | OUTPATIENT
Start: 2024-04-12 | End: 2024-04-17 | Stop reason: HOSPADM

## 2024-04-12 RX ORDER — HYDROCORTISONE 10 MG/1
20 TABLET ORAL 2 TIMES DAILY
Qty: 20 TABLET | Refills: 0 | Status: ON HOLD | DISCHARGE
Start: 2024-04-12

## 2024-04-12 RX ORDER — CHOLECALCIFEROL (VITAMIN D3) 25 MCG
2000 TABLET ORAL DAILY
Status: DISCONTINUED | OUTPATIENT
Start: 2024-04-12 | End: 2024-04-17 | Stop reason: HOSPADM

## 2024-04-12 RX ORDER — ONDANSETRON HYDROCHLORIDE 2 MG/ML
4 INJECTION, SOLUTION INTRAVENOUS EVERY 4 HOURS PRN
Status: DISCONTINUED | OUTPATIENT
Start: 2024-04-12 | End: 2024-04-17 | Stop reason: HOSPADM

## 2024-04-12 RX ORDER — INSULIN GLARGINE 100 [IU]/ML
55 INJECTION, SOLUTION SUBCUTANEOUS NIGHTLY
Status: DISCONTINUED | OUTPATIENT
Start: 2024-04-12 | End: 2024-04-17 | Stop reason: HOSPADM

## 2024-04-12 RX ORDER — ONDANSETRON 4 MG/1
4 TABLET, FILM COATED ORAL EVERY 4 HOURS PRN
Status: DISCONTINUED | OUTPATIENT
Start: 2024-04-12 | End: 2024-04-17 | Stop reason: HOSPADM

## 2024-04-12 RX ORDER — PSEUDOEPHEDRINE HCL 30 MG
100 TABLET ORAL 2 TIMES DAILY
Status: ON HOLD | DISCHARGE
Start: 2024-04-12

## 2024-04-12 RX ORDER — INSULIN LISPRO 100 [IU]/ML
14 INJECTION, SOLUTION INTRAVENOUS; SUBCUTANEOUS ONCE
Status: DISCONTINUED | OUTPATIENT
Start: 2024-04-12 | End: 2024-04-12 | Stop reason: ENTERED-IN-ERROR

## 2024-04-12 RX ORDER — ARIPIPRAZOLE 10 MG/1
5 TABLET ORAL DAILY
Status: DISCONTINUED | OUTPATIENT
Start: 2024-04-12 | End: 2024-04-17 | Stop reason: HOSPADM

## 2024-04-12 RX ORDER — POLYETHYLENE GLYCOL 3350 17 G/17G
17 POWDER, FOR SOLUTION ORAL DAILY
Qty: 30 PACKET | Refills: 0 | Status: ON HOLD | DISCHARGE
Start: 2024-04-13 | End: 2024-05-13

## 2024-04-12 RX ORDER — SERTRALINE HYDROCHLORIDE 50 MG/1
50 TABLET, FILM COATED ORAL NIGHTLY
Status: DISCONTINUED | OUTPATIENT
Start: 2024-04-12 | End: 2024-04-17 | Stop reason: HOSPADM

## 2024-04-12 RX ORDER — HYDROCORTISONE 5 MG/1
10 TABLET ORAL 2 TIMES DAILY
Status: DISCONTINUED | OUTPATIENT
Start: 2024-04-12 | End: 2024-04-14

## 2024-04-12 RX ORDER — DOCUSATE SODIUM 100 MG/1
100 CAPSULE, LIQUID FILLED ORAL DAILY PRN
Status: DISCONTINUED | OUTPATIENT
Start: 2024-04-12 | End: 2024-04-17 | Stop reason: HOSPADM

## 2024-04-12 RX ORDER — PANTOPRAZOLE SODIUM 40 MG/10ML
40 INJECTION, POWDER, LYOPHILIZED, FOR SOLUTION INTRAVENOUS DAILY
Status: DISCONTINUED | OUTPATIENT
Start: 2024-04-12 | End: 2024-04-14

## 2024-04-12 RX ORDER — TRIAMCINOLONE ACETONIDE 1 MG/G
CREAM TOPICAL 2 TIMES DAILY
Status: DISCONTINUED | OUTPATIENT
Start: 2024-04-12 | End: 2024-04-17 | Stop reason: HOSPADM

## 2024-04-12 RX ORDER — NITROGLYCERIN 0.4 MG/1
0.4 TABLET SUBLINGUAL EVERY 5 MIN PRN
Status: DISCONTINUED | OUTPATIENT
Start: 2024-04-12 | End: 2024-04-17 | Stop reason: HOSPADM

## 2024-04-12 RX ORDER — ALBUTEROL SULFATE 0.83 MG/ML
2.5 SOLUTION RESPIRATORY (INHALATION) EVERY 4 HOURS PRN
Status: DISCONTINUED | OUTPATIENT
Start: 2024-04-12 | End: 2024-04-12 | Stop reason: ALTCHOICE

## 2024-04-12 RX ORDER — ALUMINUM HYDROXIDE, MAGNESIUM HYDROXIDE, AND SIMETHICONE 1200; 120; 1200 MG/30ML; MG/30ML; MG/30ML
30 SUSPENSION ORAL EVERY 4 HOURS PRN
Status: DISCONTINUED | OUTPATIENT
Start: 2024-04-12 | End: 2024-04-17 | Stop reason: HOSPADM

## 2024-04-12 RX ORDER — DOCUSATE SODIUM 100 MG/1
100 CAPSULE, LIQUID FILLED ORAL 2 TIMES DAILY
Status: DISCONTINUED | OUTPATIENT
Start: 2024-04-12 | End: 2024-04-17 | Stop reason: HOSPADM

## 2024-04-12 RX ORDER — ALBUMIN HUMAN 250 G/1000ML
25 SOLUTION INTRAVENOUS ONCE AS NEEDED
Status: DISCONTINUED | OUTPATIENT
Start: 2024-04-12 | End: 2024-04-17 | Stop reason: HOSPADM

## 2024-04-12 RX ORDER — INSULIN LISPRO 100 [IU]/ML
0-4 INJECTION, SOLUTION INTRAVENOUS; SUBCUTANEOUS
Status: DISCONTINUED | OUTPATIENT
Start: 2024-04-12 | End: 2024-04-14

## 2024-04-12 RX ORDER — DIPHENHYDRAMINE HYDROCHLORIDE 50 MG/ML
25 INJECTION INTRAMUSCULAR; INTRAVENOUS EVERY 4 HOURS PRN
Status: DISCONTINUED | OUTPATIENT
Start: 2024-04-12 | End: 2024-04-17 | Stop reason: HOSPADM

## 2024-04-12 RX ORDER — TALC
3 POWDER (GRAM) TOPICAL NIGHTLY
Status: DISCONTINUED | OUTPATIENT
Start: 2024-04-12 | End: 2024-04-17 | Stop reason: HOSPADM

## 2024-04-12 RX ORDER — ATORVASTATIN CALCIUM 20 MG/1
40 TABLET, FILM COATED ORAL NIGHTLY
Status: DISCONTINUED | OUTPATIENT
Start: 2024-04-12 | End: 2024-04-17 | Stop reason: HOSPADM

## 2024-04-12 RX ORDER — ZOLPIDEM TARTRATE 5 MG/1
5 TABLET ORAL AS NEEDED
Status: DISCONTINUED | OUTPATIENT
Start: 2024-04-12 | End: 2024-04-17 | Stop reason: HOSPADM

## 2024-04-12 RX ORDER — INSULIN LISPRO 100 [IU]/ML
14 INJECTION, SOLUTION INTRAVENOUS; SUBCUTANEOUS ONCE
Status: DISCONTINUED | OUTPATIENT
Start: 2024-04-12 | End: 2024-04-14

## 2024-04-12 RX ORDER — IPRATROPIUM BROMIDE AND ALBUTEROL SULFATE 2.5; .5 MG/3ML; MG/3ML
3 SOLUTION RESPIRATORY (INHALATION)
Status: DISCONTINUED | OUTPATIENT
Start: 2024-04-12 | End: 2024-04-17 | Stop reason: HOSPADM

## 2024-04-12 RX ORDER — MUPIROCIN 20 MG/G
OINTMENT TOPICAL 2 TIMES DAILY
Status: DISCONTINUED | OUTPATIENT
Start: 2024-04-12 | End: 2024-04-17 | Stop reason: HOSPADM

## 2024-04-12 RX ORDER — ZINC OXIDE 20 G/100G
1 OINTMENT TOPICAL EVERY 4 HOURS PRN
Status: DISCONTINUED | OUTPATIENT
Start: 2024-04-12 | End: 2024-04-17 | Stop reason: HOSPADM

## 2024-04-12 RX ORDER — ACETAMINOPHEN 325 MG/1
650 TABLET ORAL EVERY 4 HOURS PRN
Status: DISCONTINUED | OUTPATIENT
Start: 2024-04-12 | End: 2024-04-17 | Stop reason: HOSPADM

## 2024-04-12 RX ORDER — IPRATROPIUM BROMIDE AND ALBUTEROL SULFATE 2.5; .5 MG/3ML; MG/3ML
3 SOLUTION RESPIRATORY (INHALATION) 3 TIMES DAILY
Status: DISCONTINUED | OUTPATIENT
Start: 2024-04-12 | End: 2024-04-17 | Stop reason: HOSPADM

## 2024-04-12 RX ORDER — LOPERAMIDE HYDROCHLORIDE 2 MG/1
2 CAPSULE ORAL AS NEEDED
Status: DISCONTINUED | OUTPATIENT
Start: 2024-04-12 | End: 2024-04-17 | Stop reason: HOSPADM

## 2024-04-12 RX ORDER — IBUPROFEN 200 MG
20 TABLET ORAL
Status: DISCONTINUED | OUTPATIENT
Start: 2024-04-12 | End: 2024-04-17 | Stop reason: HOSPADM

## 2024-04-12 RX ORDER — ACETAMINOPHEN 325 MG/1
TABLET ORAL
Status: DISPENSED
Start: 2024-04-12 | End: 2024-04-13

## 2024-04-12 RX ORDER — DEXTROSE 40 %
15 GEL (GRAM) ORAL
Status: DISCONTINUED | OUTPATIENT
Start: 2024-04-12 | End: 2024-04-17 | Stop reason: HOSPADM

## 2024-04-12 RX ORDER — PANTOPRAZOLE SODIUM 20 MG/1
20 TABLET, DELAYED RELEASE ORAL
Status: DISCONTINUED | OUTPATIENT
Start: 2024-04-13 | End: 2024-04-17 | Stop reason: HOSPADM

## 2024-04-12 RX ORDER — DEXTROSE 50 % IN WATER (D50W) INTRAVENOUS SYRINGE
25
Status: DISCONTINUED | OUTPATIENT
Start: 2024-04-12 | End: 2024-04-17 | Stop reason: HOSPADM

## 2024-04-12 RX ORDER — AMOXICILLIN 500 MG/1
500 CAPSULE ORAL DAILY
Qty: 30 CAPSULE | Refills: 0 | DISCHARGE
Start: 2024-04-13 | End: 2024-04-16 | Stop reason: ALTCHOICE

## 2024-04-12 RX ORDER — AMOXICILLIN 250 MG/1
500 CAPSULE ORAL DAILY
Status: DISCONTINUED | OUTPATIENT
Start: 2024-04-12 | End: 2024-04-17 | Stop reason: HOSPADM

## 2024-04-12 RX ORDER — LOPERAMIDE HYDROCHLORIDE 2 MG/1
4 CAPSULE ORAL ONCE AS NEEDED
Status: DISCONTINUED | OUTPATIENT
Start: 2024-04-12 | End: 2024-04-17 | Stop reason: HOSPADM

## 2024-04-12 RX ADMIN — HYDROCORTISONE SODIUM SUCCINATE 50 MG: 100 INJECTION, POWDER, FOR SOLUTION INTRAMUSCULAR; INTRAVENOUS at 03:15

## 2024-04-12 RX ADMIN — MIDODRINE HYDROCHLORIDE 20 MG: 10 TABLET ORAL at 07:45

## 2024-04-12 RX ADMIN — MICONAZOLE NITRATE: 2 POWDER TOPICAL at 07:45

## 2024-04-12 RX ADMIN — DOCUSATE SODIUM 100 MG: 100 CAPSULE, LIQUID FILLED ORAL at 07:45

## 2024-04-12 RX ADMIN — SEVELAMER CARBONATE 800 MG: 800 TABLET, FILM COATED ORAL at 07:45

## 2024-04-12 RX ADMIN — AMOXICILLIN 500 MG: 500 CAPSULE ORAL at 07:43

## 2024-04-12 RX ADMIN — INSULIN GLARGINE 15 UNITS: 100 INJECTION, SOLUTION SUBCUTANEOUS at 07:44

## 2024-04-12 RX ADMIN — POLYETHYLENE GLYCOL 3350 17 G: 17 POWDER, FOR SOLUTION ORAL at 07:44

## 2024-04-12 RX ADMIN — MUPIROCIN: 20 OINTMENT TOPICAL at 07:45

## 2024-04-12 RX ADMIN — HYDROXYZINE HYDROCHLORIDE 25 MG: 25 TABLET ORAL at 07:43

## 2024-04-12 RX ADMIN — ALBUTEROL SULFATE 2.5 MG: 2.5 SOLUTION RESPIRATORY (INHALATION) at 03:26

## 2024-04-12 RX ADMIN — ARIPIPRAZOLE 5 MG: 5 TABLET ORAL at 07:43

## 2024-04-12 RX ADMIN — MECLIZINE 25 MG: 12.5 TABLET ORAL at 07:43

## 2024-04-12 RX ADMIN — HYDROCORTISONE SODIUM SUCCINATE 50 MG: 100 INJECTION, POWDER, FOR SOLUTION INTRAMUSCULAR; INTRAVENOUS at 07:43

## 2024-04-12 RX ADMIN — INSULIN LISPRO 6 UNITS: 100 INJECTION, SOLUTION INTRAVENOUS; SUBCUTANEOUS at 07:44

## 2024-04-12 RX ADMIN — ASPIRIN 81 MG: 81 TABLET, COATED ORAL at 07:44

## 2024-04-12 RX ADMIN — PANTOPRAZOLE SODIUM 20 MG: 20 TABLET, DELAYED RELEASE ORAL at 07:43

## 2024-04-12 RX ADMIN — APIXABAN 2.5 MG: 2.5 TABLET, FILM COATED ORAL at 07:43

## 2024-04-12 RX ADMIN — INSULIN LISPRO 4 UNITS: 100 INJECTION, SOLUTION INTRAVENOUS; SUBCUTANEOUS at 07:44

## 2024-04-12 RX ADMIN — CEFAZOLIN 2000 MG: 2 INJECTION, POWDER, FOR SOLUTION INTRAMUSCULAR; INTRAVENOUS at 03:15

## 2024-04-12 ASSESSMENT — PAIN SCALES - GENERAL
PAINLEVEL_OUTOF10: 0
PAINLEVEL_OUTOF10: 0

## 2024-04-12 NOTE — PROGRESS NOTES
PHARMACY NOTE:   ICU Rounds Attended (10-15 minutes in patient room):    Pt diagnosis: fluid overload    Follow up/Changes:    -home meds reviewed/ reordered  -monitor blood glucose and need for increased basal    NOTES:    Antimicrobial Therapy: Day 12/14: ancef. No Cultures. ID on consult. Amoxicillin daily for chronic   Pressors: norepinephrine @ 4  Steroid: Solu cortef 50mg q6h-day 5  Insulin Therapy (goal: 140-180): medium SSI 18 units in past 24 hours. Lantus 20 units BID  Stress Ulcer Prophylaxis: Pantoprazole  DVT Prophylaxis/Anticoagulant Therapy: eliquis   Core measures assessed/met.       Leti Freeman RPH PharmD

## 2024-04-12 NOTE — PROGRESS NOTES
Renal Progress Note  Assessment and Plan:   64-year-old lady with end-stage renal disease on hemodialysis Tuesday Thursday Saturday.  Chronic hypotension maintained on large dose midodrine.  Admitted with SOB.  Sig above dry weight.  Doesn't make urine.  EF is normal.  Transferred to ICU with hypotension. Her DW is at 97 kg as outpt.  Had been leaving above that. Code blue 4/9 s/p ROSC following 1 round of CPR.      Plan:  - continue IHD TTS  - no indication for WONG   - ok to discharge to LTAC from renal standpoint       Patient Active Problem List:     Coronary artery disease involving native coronary artery of native heart with angina pectoris (HCC)     Schizophrenia, paranoid, chronic     Metabolic syndrome     Vitamin B 12 deficiency     Cerebral microvascular disease     Mixed hyperlipidemia     Other hammer toe (acquired)     Vitamin D insufficiency     Incontinence of urine     Diabetic nephropathy with proteinuria (HCC)     Essential (primary) hypertension     History of type C viral hepatitis     Urinary incontinence due to cognitive impairment     History of seizures     Stented coronary artery-plan is to stay on Plavix indefinately per Dr Walker     Diabetes mellitus ([facility])     Controlled type 2 diabetes mellitus with diabetic neuropathy, with long-term current use of insulin ([facility])     Hemiparesis, left (HCC)     Angina, class II ([facility])     Pain, unspecified     Tardive dyskinesia     Shortness of breath     Uncontrolled type 2 diabetes mellitus with hyperglycemia ([facility])     Chronic diastolic congestive heart failure (HCC)     [place] (obstructive sleep apnea)     Pulmonary hypertension, unspecified ([facility])     Class 2 severe obesity with serious comorbidity and body mass index (BMI) of 36.0 to 36.9 in adult (HCC)     Edema     Closed supracondylar fracture of right humerus     Other chronic pain     Palliative care patient     Recurrent falls     Renal failure     Difficulty in walking     ESRD (end stage renal

## 2024-04-12 NOTE — PROGRESS NOTES
7p-7a narrative    Resting in bed, watching tv, dressing to left thumb changed, pm meds given, pt able to swallow without difficulty, scratch noted to right neck, cleansed with soap and water, dressed with bandage, pt encouraged to not pick at skin, assessment completed, see flowsheet for details, pt snacking on candy and ice, call light in reach, levophed adjusted per orders, no bm noted. Updates given to daughter Paulina.

## 2024-04-12 NOTE — DISCHARGE SUMMARY
Hospital Medicine Discharge Summary    Michelle Le  :  1960  MRN:  [id number]    Admit date:  3/29/2024  Discharge date:  2024    Admitting Physician:  Werner Ortiz MD  Primary Care Physician:  Adilene Terry MD      Discharge Diagnoses:    Principal Problem:    Fluid overload  Active Problems:    Sepsis due to skin infection (HCC)    Heart failure (HCC)    Encounter for hospice care discussion    Advanced care planning/counseling discussion    Goals of care, counseling/discussion    Palliative care encounter    Nonhealing nonsurgical wound    Skin picking habit    Hypotension    ESRD on dialysis (HCC)    Acute decompensated heart failure (HCC)    Constipation    Chronic antibiotic suppression    Chronic infection of prosthetic knee (HCC)    Lack of intravenous access    Pressure injury, unstageable, with suspected deep tissue injury (HCC)    Hypogammaglobulinemia (HCC)    Adrenal insufficiency (HCC)  Resolved Problems:    * No resolved hospital problems. *      Hospital Course:   Michelle Le is a 64 y.o. female that was admitted and treated at [facility] for the following medical issues:     Cardiac arrest s/p ROSC  - patient was experiencing generalized limb movement concerning for seizure prior to the cardiac arrest,   - ROSC obtained after 1 round of CPR/Epi,   - IV Keppra x 1 given,   - CT head negative for acute findings  - CTA of the chest negative for PE  - TTE showed  LVEF of 60 - 65%, DD, dilated RV, RVSP- 62 mmHg.   - followed by cardiology and neurology      Acute / chronic hypotension  - possibly related to septic shock, due to left hand cellulitis/LUE infected ulcer and adrenal insufficiency  - requiring Norepinephrine, Solucortef,Albumin,Midodrine, Florinef  - continue IV Cefazolin until  per ID  - followed by critical care and ID service     ESRD on IHD  - followed by nephrology     PAF/AFL  - continued Apixaban     IDDM with hyperglycemia  - treated  nightly) As needed.     * OneTouch Verio strip  Generic drug: blood glucose test strips  QID     * blood glucose test strips strip  Commonly known as: ASCENSIA AUTODISC VI;ONE TOUCH ULTRA TEST VI  1 each by In Vitro route daily As needed.     * FREESTYLE LITE strip  Generic drug: blood glucose test strips  Use three time daily to test BS E11.65     Handicap Placard Misc  by Does not apply route Expiration in 5 years.     hydrOXYzine HCl 25 MG tablet  Commonly known as: ATARAX     melatonin 3 MG Tabs tablet     midodrine 5 MG tablet  Commonly known as: PROAMATINE  Take 3 tablets by mouth 3 times daily (with meals)     mupirocin 2 % ointment  Commonly known as: BACTROBAN     ondansetron 4 MG tablet  Commonly known as: ZOFRAN     * OneTouch Delica Lancets 33G Misc  QID     * FreeStyle Lancets Misc  Test 4x daily     pantoprazole 20 MG tablet  Commonly known as: PROTONIX     sertraline 50 MG tablet  Commonly known as: ZOLOFT  Take 1 tablet by mouth nightly     sevelamer 800 MG tablet  Commonly known as: RENVELA     Sure Comfort Pen Needles 30G X 8 MM Misc  Generic drug: Insulin Pen Needle  USE AS DIRECTED FIVE TIMES A DAILY     Trelegy Ellipta 100-62.5-25 MCG/ACT Aepb inhaler  Generic drug: fluticasone-umeclidin-vilant  Inhale 1 puff into the lungs daily     triamcinolone 0.1 % cream  Commonly known as: KENALOG  Apply topically 2 times daily.     Vitamin D3 50 MCG (2000 UT) Caps capsule  Generic drug: vitamin D           * This list has 9 medication(s) that are the same as other medications prescribed for you. Read the directions carefully, and ask your doctor or other care provider to review them with you.                STOP taking these medications      LACTOBACILLUS PO     metoprolol succinate 25 MG extended release tablet  Commonly known as: TOPROL XL               Where to Get Your Medications        Information about where to get these medications is not yet available    Ask your nurse or doctor about these

## 2024-04-12 NOTE — PROGRESS NOTES
Nonrheumatic mitral valve regurgitation    Nonrheumatic tricuspid valve regurgitation    Need for extended care facility    Chronic pain of both shoulders    Hemodialysis-associated hypotension    Anginal chest pain at rest (HCC)    Chest pain    Unstable angina (HCC)    NSTEMI (non-ST elevated myocardial infarction) ([facility])    CKD (chronic kidney disease) stage 4, GFR 15-29 ml/min (HCC)    Hyperkalemia    Impaired mobility and activities of daily living dt polyneuropathy and reccent fall     Dialysis patient ([facility])    Unspecified open wound of right upper arm, initial encounter    Multiple closed fractures of right lower extremity and ribs    Closed T11 fracture ([facility])    Encephalopathy acute    MRSA bacteremia    Sepsis due to Enterococcus (HCC)    Local infection due to central venous catheter    DJD (degenerative joint disease), cervical    Closed head injury    Sarcopenia    Fall from standing    Sepsis due to skin infection ([facility])    Chronic hepatitis C ([facility])    Catheter-related bloodstream infection    Enterococcus faecalis infection    Cervical stenosis of spinal canal    Ataxic gait    Lumbar stenosis with neurogenic claudication    Falls infrequently    Infection of venous access port    Diarrhea    Anemia, unspecified    Eczema    AMS (altered mental status)    Heart failure ([facility])    Interstitial lung disease (HCC)    Cellulitis of left hand    Chronic osteomyelitis of knee ([facility])    Generalized weakness    Shock ([facility])    Fluid overload    Encounter for hospice care discussion    Advanced care planning/counseling discussion    Goals of care, counseling/discussion    Palliative care encounter    Nonhealing nonsurgical wound    Skin picking habit    Hypotension    ESRD on dialysis (HCC)    Acute decompensated heart failure (HCC)    Constipation    Chronic antibiotic suppression    Chronic infection of prosthetic knee ([facility])    Lack of intravenous access    Pressure injury, unstageable, with suspected deep  tissue injury (HCC)    Hypogammaglobulinemia (HCC)    Adrenal insufficiency (HCC)       PLAN:  Continue IV Ancef for 5 more days   Continue chronic suppressive therapy with p.o. amoxicillin for coverage for Enterococcus faecalis prosthetic joint infection  Continue local wound care  Patient was instructed to avoid skin popping and picking  Follow-up CBC  Agree with midodrine that was started few days ago  Vasopressor support and weaning per intensivist  Has hyperkeratotic toe lesion, podiatry can evaluate non urgent fashion        KIRSTIN VO MD

## 2024-04-12 NOTE — PROGRESS NOTES
Pulmonary & Critical Care Medicine ICU Progress Note  Chief complaint : Shock    Subjunctive/24 hour events :   Patient seen and examined during multidisciplinary rounds with RN, charge nurse, RT, pharmacy, dietitian, and social service.      Doing good, no complaint, no dizziness, back on Levophed at 2 mg/min, no chest pain, no nausea or vomiting, no diarrhea.  No fever, she is on 2 L O2 saturation 94%, bowels moving    New information updated in the note today, rest of the examination did not change compared to yesterday.  Social History     Tobacco Use    Smoking status: Never     Passive exposure: Never    Smokeless tobacco: Never   Substance Use Topics    Alcohol use: No     Alcohol/week: 0.0 standard drinks of alcohol         Problem Relation Age of Onset    Cancer Mother 68        survived    Breast Cancer Mother     Hypertension Father     Diabetes Sister     Mental Illness Sister     Colon Cancer Neg Hx        No results for input(s): \"PHART\", \"ALJ0ZXL\", \"PO2ART\" in the last 72 hours.      MV Settings:     / / /            IV:   norepinephrine 4 mcg/min (04/12/24 0639)    sodium chloride Stopped (03/30/24 1524)    dextrose         Vitals:  BP (!) 115/49   Pulse 81   Temp 97.6 °F (36.4 °C) (Axillary)   Resp 24   Ht 1.702 m (5' 7.01\")   Wt 96.6 kg (212 lb 15.4 oz)   LMP  (LMP Unknown)   SpO2 94%   BMI 33.35 kg/m²    Tmax:        Intake/Output Summary (Last 24 hours) at 4/12/2024 0843  Last data filed at 4/12/2024 0639  Gross per 24 hour   Intake 763.78 ml   Output 3300 ml   Net -2536.22 ml         EXAM:  General: alert, cooperative, no distress, slightly confused  Head: normocephalic, atraumatic  Eyes:No gross abnormalities.  ENT:  MMM no lesions  Neck:  supple and no masses  Chest : clear to auscultation bilaterally- no wheezes, rales or rhonchi, normal air movement, no respiratory distress  Heart:: Heart sounds are normal.  Regular rate and rhythm without murmur, gallop or rub.  ABD:  symmetric,  PROFILE:   No results for input(s): \"AST\", \"ALT\", \"LIPASE\", \"AMYLASE\", \"ALB\", \"BILIDIR\", \"BILITOT\", \"ALKPHOS\" in the last 72 hours.    PT/INR: No results for input(s): \"PROTIME\", \"INR\" in the last 72 hours.  APTT: No results for input(s): \"APTT\" in the last 72 hours.  UA:No results for input(s): \"NITRITE\", \"COLORU\", \"PHUR\", \"LABCAST\", \"WBCUA\", \"RBCUA\", \"MUCUS\", \"TRICHOMONAS\", \"YEAST\", \"BACTERIA\", \"CLARITYU\", \"SPECGRAV\", \"LEUKOCYTESUR\", \"UROBILINOGEN\", \"BILIRUBINUR\", \"BLOODU\", \"GLUCOSEU\", \"AMORPHOUS\" in the last 72 hours.    Invalid input(s): \"KETONESU\"    Cultures:  Negative so far  Films:  CXR films reviewed by me and it showed bilateral pleural effusion    Arterial ultrasound shows no subclavian artery stenosis    Assessment:  This is a critically ill patient at risk of deterioration / death , needing close ICU monitoring and intervention due to below noted problems       Postcardiac arrest, brief, etiology is not clear, resolved, no neurodeficit  Shock etiology is not clear,?  Septic,?  Adrenal insufficiency, continues to require Levophed  End-stage renal disease on dialysis  Volume overload  Pulmonary hypertension  Swelling right lower extremity, no DVT  A-fib on   Eliquis   Insomnia  Constipation, resolved  Hyperglycemia secondary to steroids      Recommendation  Levophed to maintain mean arterial pressure 65-70  Continue midodrine  Continue Solu-Cortef, plan per endocrinology  Monitor blood pressure and lower extremities  Procalcitonin slightly up today, currently on amoxicillin, antibiotic per ID, monitoring for now  Repeat procalcitonin tomorrow  Continue Eliquis   Target blood sugar 140-180   Increase Lantus to 20 twice daily, Endocrinology following  Continue laxatives  Ambien as needed    Possible transfer to LTAC today       Due to the immediate potential for life-threatening deterioration due to shock, I spent 35 minutes providing critical care.  This time is excluding time spent performing  Age appropriate

## 2024-04-12 NOTE — CARE COORDINATION
PT IS DC TO [facility] TODAY AT 10:30AM  BY [facility].     LSW WILL FOLLOW.     Electronically signed by HIPOLITO Rendon on 4/12/2024 at 7:31 AM

## 2024-04-12 NOTE — PROGRESS NOTES
Report called to [facility]. Spoke with Nurse El. Confirmed 10:30am  with [facility].

## 2024-04-12 NOTE — INTERDISCIPLINARY ROUNDS
Spiritual Care Services     Summary of Visit:  Attended interdisciplinary rounds. Plan for pt to discharge to LTAC today.  Pt has an AD listing her dtr Angelina as her POA, with Brennen Danielson as a secondary decision maker. Pt is [employer].     Encounter Summary  Encounter Overview/Reason : Interdisciplinary rounds  Service Provided For:: Patient  Referral/Consult From:: Multi-disciplinary team  Support System: Children, Family members  Last Encounter : 04/05/24  Complexity of Encounter: High  Begin Time: 0745  End Time : 0830  Total Time Calculated: 45 min     Crisis  Type: Code Blue  Spiritual/Emotional needs  Type: Spiritual Support     Grief, Loss, and Adjustments  Type: Life Adjustments                Spiritual Assessment/Intervention/Outcomes:    Assessment: Anxious, Shock, Tearful    Intervention: Nurtured Hope, Prayer (assurance of)/[place], Sustaining Presence/Ministry of presence    Outcome: Concerns relieved, Coping, Less anxious, Less agitated      Care Plan:    Plan and Referrals  Plan/Referrals: Continue to visit, (comment)      Spiritual Care Services   Electronically signed by LUKAS Rosenthal on 4/12/2024 at 12:54 PM.    To reach a  for emotional and spiritual support, place an EPIC consult request.   If a  is needed immediately, dial “0” and ask to page the on-call .

## 2024-04-12 NOTE — PROGRESS NOTES
Infectious Diseases Inpatient Progress Note          HISTORY OF PRESENT ILLNESS:  Follow up sepsis/shock in a patient with hypogammaglobulinemia, end-stage renal disease on hemodialysis, infected upper extremity ulcers, diabetes mellitus type 2 on IV Ancef, well tolerated.  Patient's  is currently back on Levophed.    Patient reported nausea and abdominal pain.  Left arm ulcers. s.  Patient has a tendency to scratch pick scabs from her skin.  Decreased appetite  No confusion.  Reports good appetite.   No cough.  Positive shortness of breath.   Positive generalized weakness  No fevers or chills  Currently connected to hemodialysis  Positive extensive bruising of upper chest and neck area  No rash or itching  No mouth soreness  Patient remains to be in ICU  Current Medications:     hydrocortisone sodium succinate PF  50 mg IntraVENous Q6H    insulin lispro  0-8 Units SubCUTAneous TID WC    insulin lispro  0-4 Units SubCUTAneous Nightly    insulin glargine  15 Units SubCUTAneous BID    insulin lispro  4 Units SubCUTAneous TID WC    docusate sodium  100 mg Oral BID    polyethylene glycol  17 g Oral Daily    meclizine  25 mg Oral Daily    amoxicillin  500 mg Oral Daily    midodrine  20 mg Oral TID WC    mupirocin   Topical BID    ceFAZolin  2,000 mg IntraVENous Q12H    hydrOXYzine HCl  25 mg Oral BID    sodium chloride flush  5-40 mL IntraVENous 2 times per day    ARIPiprazole  5 mg Oral Daily    atorvastatin  40 mg Oral Nightly    pantoprazole  20 mg Oral Daily    sevelamer  800 mg Oral TID    apixaban  2.5 mg Oral BID    aspirin EC  81 mg Oral Daily    sertraline  50 mg Oral Nightly    miconazole   Topical BID    albuterol  2.5 mg Nebulization BID RT       Allergies:  Codeine and Oxycontin [oxycodone hcl]      Review of Systems  14 system review is negative other than HPI    Physical Exam  Vitals:    04/12/24 0730 04/12/24 0745 04/12/24 0800 04/12/24 0815   BP: (!) 155/51 (!) 155/58 (!) 125/15 (!) 115/49   Pulse: 82 94  of ribs of right side    Nonrheumatic mitral valve regurgitation    Nonrheumatic tricuspid valve regurgitation    Need for extended care facility    Chronic pain of both shoulders    Hemodialysis-associated hypotension    Anginal chest pain at rest (HCC)    Chest pain    Unstable angina ([facility])    NSTEMI (non-ST elevated myocardial infarction) ([facility])    CKD (chronic kidney disease) stage 4, GFR 15-29 ml/min ([facility])    Hyperkalemia    Impaired mobility and activities of daily living dt polyneuropathy and reccent fall     Dialysis patient ([facility])    Unspecified open wound of right upper arm, initial encounter    Multiple closed fractures of right lower extremity and ribs    Closed T11 fracture ([facility])    Encephalopathy acute    MRSA bacteremia    Sepsis due to Enterococcus (HCC)    Local infection due to central venous catheter    DJD (degenerative joint disease), cervical    Closed head injury    Sarcopenia    Fall from standing    Sepsis due to skin infection ([facility])    Chronic hepatitis C ([facility])    Catheter-related bloodstream infection    Enterococcus faecalis infection    Cervical stenosis of spinal canal    Ataxic gait    Lumbar stenosis with neurogenic claudication    Falls infrequently    Infection of venous access port    Diarrhea    Anemia, unspecified    Eczema    AMS (altered mental status)    Heart failure (HCC)    Interstitial lung disease (HCC)    Cellulitis of left hand    Chronic osteomyelitis of knee (HCC)    Generalized weakness    Shock ([facility])    Fluid overload    Encounter for hospice care discussion    Advanced care planning/counseling discussion    Goals of care, counseling/discussion    Palliative care encounter    Nonhealing nonsurgical wound    Skin picking habit    Hypotension    ESRD on dialysis (HCC)    Acute decompensated heart failure (HCC)    Constipation    Chronic antibiotic suppression    Chronic infection of prosthetic knee ([facility])    Lack of intravenous access    Pressure injury, unstageable,

## 2024-04-12 NOTE — PROGRESS NOTES
Indications: High Amount of Fats in the Blood)  Handicap Placard MISC, by Does not apply route Expiration in 5 years.  blood glucose test strips (ONETOUCH VERIO) strip, QID  OneTouch Delica Lancets 33G MISC, QID  aspirin EC 81 MG EC tablet, Take 1 tablet by mouth daily (Patient taking differently: Take 1 tablet by mouth daily Indications: Thickening and Hardening of Heart Arteries)  blood glucose test strips (FREESTYLE LITE) strip, 1 each by Does not apply route 4 times daily (before meals and nightly) As needed.  acetaminophen (TYLENOL) 325 MG tablet, Take 2 tablets by mouth every 4 hours as needed for Pain (For mild to moderate pain (Pain 1-6 out of 10 on pain scale))  Blood Glucose Monitoring Suppl (FREESTYLE LITE) CORETTA, 1 Device by Does not apply route daily as needed (Diabetes) Use freestyle meter to test blood sugar as needed    Current Hospital Medications:   Scheduled Meds:   insulin glargine  20 Units SubCUTAneous BID    hydrocortisone sodium succinate PF  50 mg IntraVENous Q6H    insulin lispro  0-8 Units SubCUTAneous TID WC    insulin lispro  0-4 Units SubCUTAneous Nightly    insulin lispro  4 Units SubCUTAneous TID WC    docusate sodium  100 mg Oral BID    polyethylene glycol  17 g Oral Daily    meclizine  25 mg Oral Daily    amoxicillin  500 mg Oral Daily    midodrine  20 mg Oral TID WC    mupirocin   Topical BID    ceFAZolin  2,000 mg IntraVENous Q12H    hydrOXYzine HCl  25 mg Oral BID    sodium chloride flush  5-40 mL IntraVENous 2 times per day    ARIPiprazole  5 mg Oral Daily    atorvastatin  40 mg Oral Nightly    pantoprazole  20 mg Oral Daily    sevelamer  800 mg Oral TID    apixaban  2.5 mg Oral BID    aspirin EC  81 mg Oral Daily    sertraline  50 mg Oral Nightly    miconazole   Topical BID    albuterol  2.5 mg Nebulization BID RT     Continuous Infusions:   norepinephrine 4 mcg/min (04/12/24 0639)    sodium chloride Stopped (03/30/24 1524)    dextrose       PRN Meds:.perflutren lipid  meclizine  25 mg Oral Daily    amoxicillin  500 mg Oral Daily    midodrine  20 mg Oral TID WC    mupirocin   Topical BID    ceFAZolin  2,000 mg IntraVENous Q12H    hydrOXYzine HCl  25 mg Oral BID    sodium chloride flush  5-40 mL IntraVENous 2 times per day    ARIPiprazole  5 mg Oral Daily    atorvastatin  40 mg Oral Nightly    pantoprazole  20 mg Oral Daily    sevelamer  800 mg Oral TID    apixaban  2.5 mg Oral BID    aspirin EC  81 mg Oral Daily    sertraline  50 mg Oral Nightly    miconazole   Topical BID    albuterol  2.5 mg Nebulization BID RT     Continuous Infusions:   norepinephrine 4 mcg/min (04/12/24 0639)    sodium chloride Stopped (03/30/24 1524)    dextrose         Recent Labs     04/10/24  0533 04/11/24  0538 04/12/24  0536   WBC 10.0 11.5* 13.5*   HGB 11.6* 10.5* 10.8*   HCT 35.6* 31.0* 32.7*   .4* 100.6* 100.6*    160 204     Recent Labs     04/10/24  0531 04/11/24  0539 04/12/24  0535    134* 133*   K 3.8 4.2 3.9   CL 93* 90* 89*   CO2 25 25 26   PHOS 2.9 2.6 2.1*   BUN 21 30* 22   CREATININE 4.68* 5.64* 4.25*     No results for input(s): \"PROT\", \"INR\" in the last 72 hours.    03/05/24    ECHO (TTE) COMPLETE (PRN CONTRAST/BUBBLE/STRAIN/3D) 03/06/2024  3:38 PM (Final)    Interpretation Summary    Left Ventricle: Normal left ventricular systolic function with a visually estimated EF of 55 - 60%. Left ventricle size is normal. Normal wall thickness. Normal wall motion. Diastolic dysfunction present with normal LV EF.    Right Ventricle: Right ventricle is moderately dilated. Moderately reduced systolic function.    Mitral Valve: Moderately thickened leaflet. Mild annular calcification of the mitral valve. Mild to moderate regurgitation with an eccentrically directed jet and may underestimate severity.    Tricuspid Valve: Moderate to severe regurgitation. Severely elevated RVSP, consistent with severe pulmonary hypertension.    Left Atrium: Left atrium is mildly dilated.    Right

## 2024-04-12 NOTE — PROGRESS NOTES
Hospitalist Progress Note      PCP: Adilene Terry MD    Date of Admission: 3/29/2024    Chief Complaint:  no acute events, is afebrile, still hypotensive requiring Norepinephrine infusion    Medications:  Reviewed    Infusion Medications    norepinephrine 4 mcg/min (04/12/24 0639)    sodium chloride Stopped (03/30/24 1524)    dextrose       Scheduled Medications    insulin glargine  20 Units SubCUTAneous BID    hydrocortisone sodium succinate PF  50 mg IntraVENous Q6H    insulin lispro  0-8 Units SubCUTAneous TID WC    insulin lispro  0-4 Units SubCUTAneous Nightly    insulin lispro  4 Units SubCUTAneous TID WC    docusate sodium  100 mg Oral BID    polyethylene glycol  17 g Oral Daily    meclizine  25 mg Oral Daily    amoxicillin  500 mg Oral Daily    midodrine  20 mg Oral TID WC    mupirocin   Topical BID    ceFAZolin  2,000 mg IntraVENous Q12H    hydrOXYzine HCl  25 mg Oral BID    sodium chloride flush  5-40 mL IntraVENous 2 times per day    ARIPiprazole  5 mg Oral Daily    atorvastatin  40 mg Oral Nightly    pantoprazole  20 mg Oral Daily    sevelamer  800 mg Oral TID    apixaban  2.5 mg Oral BID    aspirin EC  81 mg Oral Daily    sertraline  50 mg Oral Nightly    miconazole   Topical BID    albuterol  2.5 mg Nebulization BID RT     PRN Meds: perflutren lipid microspheres, zolpidem, HYDROcodone 5 mg - acetaminophen **OR** HYDROcodone 5 mg - acetaminophen, sodium chloride flush, sodium chloride, ondansetron **OR** ondansetron, acetaminophen **OR** acetaminophen, albumin human 25%, glucose, dextrose bolus **OR** dextrose bolus, glucagon (rDNA), dextrose, albuterol      Intake/Output Summary (Last 24 hours) at 4/12/2024 0950  Last data filed at 4/12/2024 0639  Gross per 24 hour   Intake 763.78 ml   Output 3300 ml   Net -2536.22 ml         Exam:    BP (!) 115/49   Pulse 81   Temp 97.6 °F (36.4 °C) (Axillary)   Resp 24   Ht 1.702 m (5' 7.01\")   Wt 96.6 kg (212 lb 15.4 oz)   LMP  (LMP Unknown)   SpO2 94%    nephrology    PAF/AFL  - TTE showed preserved LVEF,DD  - on Apixaban  - followed by cardiology    Hx of CAD s/p CABG/PCI  - on ASA, Lipitor    IDDM with hyperglycemia  - on ISS, Lantus, premeal coverage    Constipation   - continue bowel regimen     Chronic left knee PJI/OM   - continue chronic suppressive therapy (Amoxicillin)    Diet: ADULT DIET; Regular; 4 carb choices (60 gm/meal)    Code Status: Full Code      Disposition - LTAC today        Electronically signed by WILEY BARROW MD on 4/12/2024 at 9:50 AM

## 2024-04-13 LAB
ALBUMIN SERPL BCP-MCNC: 4.8 G/DL (ref 3.4–5)
ALP SERPL-CCNC: 54 U/L (ref 33–136)
ALT SERPL W P-5'-P-CCNC: 3 U/L (ref 7–45)
ANION GAP SERPL CALC-SCNC: 15 MMOL/L (ref 10–20)
AST SERPL W P-5'-P-CCNC: 25 U/L (ref 9–39)
BILIRUB SERPL-MCNC: 1 MG/DL (ref 0–1.2)
BUN SERPL-MCNC: 16 MG/DL (ref 6–23)
CALCIUM SERPL-MCNC: 10 MG/DL (ref 8.6–10.3)
CHLORIDE SERPL-SCNC: 93 MMOL/L (ref 98–107)
CO2 SERPL-SCNC: 29 MMOL/L (ref 21–32)
CREAT SERPL-MCNC: 3.06 MG/DL (ref 0.5–1.05)
EGFRCR SERPLBLD CKD-EPI 2021: 16 ML/MIN/1.73M*2
EKG ATRIAL RATE: 89 BPM
EKG P AXIS: 133 DEGREES
EKG P-R INTERVAL: 168 MS
EKG Q-T INTERVAL: 420 MS
EKG QRS DURATION: 134 MS
EKG QTC CALCULATION (BAZETT): 511 MS
EKG R AXIS: -67 DEGREES
EKG T AXIS: 121 DEGREES
EKG VENTRICULAR RATE: 89 BPM
ERYTHROCYTE [DISTWIDTH] IN BLOOD BY AUTOMATED COUNT: 16.5 % (ref 11.5–14.5)
GLUCOSE SERPL-MCNC: 173 MG/DL (ref 74–99)
HAV IGM SER QL: NONREACTIVE
HBV CORE IGM SER QL: NONREACTIVE
HBV SURFACE AG SERPL QL IA: NONREACTIVE
HCT VFR BLD AUTO: 31.2 % (ref 36–46)
HCV AB SER QL: REACTIVE
HGB BLD-MCNC: 10.4 G/DL (ref 12–16)
MAGNESIUM SERPL-MCNC: 1.9 MG/DL (ref 1.6–2.4)
MCH RBC QN AUTO: 33.2 PG (ref 26–34)
MCHC RBC AUTO-ENTMCNC: 33.3 G/DL (ref 32–36)
MCV RBC AUTO: 100 FL (ref 80–100)
NRBC BLD-RTO: 0 /100 WBCS (ref 0–0)
PHOSPHATE SERPL-MCNC: 1.4 MG/DL (ref 2.5–4.9)
PLATELET # BLD AUTO: 197 X10*3/UL (ref 150–450)
POTASSIUM SERPL-SCNC: 3.4 MMOL/L (ref 3.5–5.3)
PROT SERPL-MCNC: 7.4 G/DL (ref 6.4–8.2)
RBC # BLD AUTO: 3.13 X10*6/UL (ref 4–5.2)
SODIUM SERPL-SCNC: 134 MMOL/L (ref 136–145)
WBC # BLD AUTO: 12.1 X10*3/UL (ref 4.4–11.3)

## 2024-04-13 PROCEDURE — 83735 ASSAY OF MAGNESIUM: CPT | Performed by: INTERNAL MEDICINE

## 2024-04-13 PROCEDURE — 87522 HEPATITIS C REVRS TRNSCRPJ: CPT | Mod: ELYLAB | Performed by: INTERNAL MEDICINE

## 2024-04-13 PROCEDURE — 84100 ASSAY OF PHOSPHORUS: CPT | Performed by: INTERNAL MEDICINE

## 2024-04-13 PROCEDURE — 80074 ACUTE HEPATITIS PANEL: CPT | Mod: ELYLAB | Performed by: INTERNAL MEDICINE

## 2024-04-13 PROCEDURE — 80053 COMPREHEN METABOLIC PANEL: CPT | Performed by: INTERNAL MEDICINE

## 2024-04-13 PROCEDURE — 85027 COMPLETE CBC AUTOMATED: CPT | Performed by: INTERNAL MEDICINE

## 2024-04-13 RX ORDER — PANTOPRAZOLE SODIUM 20 MG/1
TABLET, DELAYED RELEASE ORAL
Status: DISPENSED
Start: 2024-04-13 | End: 2024-04-13

## 2024-04-13 RX ORDER — ALBUMIN HUMAN 250 G/1000ML
25 SOLUTION INTRAVENOUS DAILY
Status: DISCONTINUED | OUTPATIENT
Start: 2024-04-13 | End: 2024-04-15

## 2024-04-13 RX ORDER — SEVELAMER CARBONATE 800 MG/1
TABLET, FILM COATED ORAL
Status: DISPENSED
Start: 2024-04-13 | End: 2024-04-13

## 2024-04-13 RX ORDER — SODIUM CHLORIDE 9 MG/ML
INJECTION, SOLUTION INTRAVENOUS
Status: DISPENSED
Start: 2024-04-13 | End: 2024-04-13

## 2024-04-13 RX ORDER — NOREPINEPHRINE BITARTRATE/D5W 16MG/250ML
.01-2 PLASTIC BAG, INJECTION (ML) INTRAVENOUS CONTINUOUS
Status: DISCONTINUED | OUTPATIENT
Start: 2024-04-13 | End: 2024-04-15 | Stop reason: ALTCHOICE

## 2024-04-13 RX ORDER — MIDODRINE HYDROCHLORIDE 5 MG/1
15 TABLET ORAL
Status: DISCONTINUED | OUTPATIENT
Start: 2024-04-13 | End: 2024-04-17 | Stop reason: HOSPADM

## 2024-04-13 RX ORDER — CEFAZOLIN 1 G/1
INJECTION, POWDER, FOR SOLUTION INTRAVENOUS
Status: DISPENSED
Start: 2024-04-13 | End: 2024-04-14

## 2024-04-13 NOTE — PROGRESS NOTES
Physical Therapy  Facility/Department: [facility] MED SURG IC05/IC05-01  Physical Therapy Discharge      NAME: Michelle Le    : 1960 (64 y.o.)  MRN: [id number]    Account: [id number]  Gender: female      Patient has been discharged from [facility] hospital. DC patient from current PT program.      Electronically signed by Therese Landry PT on 24 at 1:29 PM EDT

## 2024-04-14 LAB
HCV RNA SERPL NAA+PROBE-ACNC: NOT DETECTED K[IU]/ML
HCV RNA SERPL NAA+PROBE-LOG IU: NORMAL {LOG_IU}/ML

## 2024-04-14 RX ORDER — SEVELAMER CARBONATE 800 MG/1
TABLET, FILM COATED ORAL
Status: DISPENSED
Start: 2024-04-14 | End: 2024-04-14

## 2024-04-14 RX ORDER — SEVELAMER CARBONATE 800 MG/1
800 TABLET, FILM COATED ORAL
Status: DISCONTINUED | OUTPATIENT
Start: 2024-04-14 | End: 2024-04-17 | Stop reason: HOSPADM

## 2024-04-14 RX ORDER — INSULIN LISPRO 100 [IU]/ML
0-4 INJECTION, SOLUTION INTRAVENOUS; SUBCUTANEOUS
Status: DISCONTINUED | OUTPATIENT
Start: 2024-04-14 | End: 2024-04-17 | Stop reason: HOSPADM

## 2024-04-14 RX ORDER — HYDROCORTISONE 5 MG/1
10 TABLET ORAL DAILY
Status: DISCONTINUED | OUTPATIENT
Start: 2024-04-14 | End: 2024-04-17 | Stop reason: HOSPADM

## 2024-04-14 RX ORDER — POTASSIUM CHLORIDE 20 MEQ/1
20 TABLET, EXTENDED RELEASE ORAL ONCE
Status: DISCONTINUED | OUTPATIENT
Start: 2024-04-14 | End: 2024-04-16

## 2024-04-14 RX ORDER — HYDROCORTISONE 5 MG/1
5 TABLET ORAL NIGHTLY
Status: DISCONTINUED | OUTPATIENT
Start: 2024-04-14 | End: 2024-04-17 | Stop reason: HOSPADM

## 2024-04-15 LAB
ANION GAP SERPL CALC-SCNC: 21 MMOL/L (ref 10–20)
BUN SERPL-MCNC: 39 MG/DL (ref 6–23)
CALCIUM SERPL-MCNC: 10.6 MG/DL (ref 8.6–10.3)
CHLORIDE SERPL-SCNC: 91 MMOL/L (ref 98–107)
CO2 SERPL-SCNC: 26 MMOL/L (ref 21–32)
CREAT SERPL-MCNC: 5.15 MG/DL (ref 0.5–1.05)
EGFRCR SERPLBLD CKD-EPI 2021: 9 ML/MIN/1.73M*2
ERYTHROCYTE [DISTWIDTH] IN BLOOD BY AUTOMATED COUNT: 16.9 % (ref 11.5–14.5)
GLUCOSE SERPL-MCNC: 208 MG/DL (ref 74–99)
HCT VFR BLD AUTO: 29.4 % (ref 36–46)
HGB BLD-MCNC: 10.3 G/DL (ref 12–16)
MCH RBC QN AUTO: 34.8 PG (ref 26–34)
MCHC RBC AUTO-ENTMCNC: 35 G/DL (ref 32–36)
MCV RBC AUTO: 99 FL (ref 80–100)
NRBC BLD-RTO: 0 /100 WBCS (ref 0–0)
PLATELET # BLD AUTO: 214 X10*3/UL (ref 150–450)
POTASSIUM SERPL-SCNC: 4.3 MMOL/L (ref 3.5–5.3)
RBC # BLD AUTO: 2.96 X10*6/UL (ref 4–5.2)
SODIUM SERPL-SCNC: 134 MMOL/L (ref 136–145)
WBC # BLD AUTO: 12.3 X10*3/UL (ref 4.4–11.3)

## 2024-04-15 PROCEDURE — 80048 BASIC METABOLIC PNL TOTAL CA: CPT | Performed by: INTERNAL MEDICINE

## 2024-04-15 PROCEDURE — 85027 COMPLETE CBC AUTOMATED: CPT | Performed by: INTERNAL MEDICINE

## 2024-04-15 PROCEDURE — 36415 COLL VENOUS BLD VENIPUNCTURE: CPT | Performed by: INTERNAL MEDICINE

## 2024-04-15 RX ORDER — HYDROCODONE BITARTRATE AND ACETAMINOPHEN 5; 325 MG/1; MG/1
1 TABLET ORAL EVERY 4 HOURS PRN
Status: DISCONTINUED | OUTPATIENT
Start: 2024-04-15 | End: 2024-04-17 | Stop reason: HOSPADM

## 2024-04-15 RX ORDER — INSULIN LISPRO 100 [IU]/ML
14 INJECTION, SOLUTION INTRAVENOUS; SUBCUTANEOUS ONCE
Status: DISCONTINUED | OUTPATIENT
Start: 2024-04-15 | End: 2024-04-16

## 2024-04-16 ENCOUNTER — HOSPITAL ENCOUNTER (INPATIENT)
Age: 64
LOS: 15 days | Discharge: NURSING, REHAB OR LONG-TERM CARE | DRG: 813 | End: 2024-05-01
Attending: EMERGENCY MEDICINE | Admitting: STUDENT IN AN ORGANIZED HEALTH CARE EDUCATION/TRAINING PROGRAM
Payer: MEDICARE

## 2024-04-16 ENCOUNTER — APPOINTMENT (OUTPATIENT)
Dept: CT IMAGING | Age: 64
DRG: 813 | End: 2024-04-16
Payer: MEDICARE

## 2024-04-16 DIAGNOSIS — D68.9 COAGULOPATHY (HCC): ICD-10-CM

## 2024-04-16 DIAGNOSIS — K92.1 GASTROINTESTINAL HEMORRHAGE WITH MELENA: Primary | ICD-10-CM

## 2024-04-16 DIAGNOSIS — R18.8 OTHER ASCITES: ICD-10-CM

## 2024-04-16 PROBLEM — K92.2 GI BLEED: Status: ACTIVE | Noted: 2024-04-16

## 2024-04-16 LAB
ABO + RH BLD: NORMAL
ALBUMIN SERPL-MCNC: 4.5 G/DL (ref 3.5–4.6)
ALP SERPL-CCNC: 75 U/L (ref 40–130)
ALT SERPL-CCNC: <5 U/L (ref 0–33)
ANION GAP SERPL CALCULATED.3IONS-SCNC: 21 MEQ/L (ref 9–15)
ANISOCYTOSIS BLD QL SMEAR: ABNORMAL
AST SERPL-CCNC: 16 U/L (ref 0–35)
BASOPHILS # BLD: 0 K/UL (ref 0–0.2)
BASOPHILS NFR BLD: 0.6 %
BILIRUB SERPL-MCNC: 0.8 MG/DL (ref 0.2–0.7)
BLD GP AB SCN SERPL QL: NORMAL
BUN SERPL-MCNC: 51 MG/DL (ref 8–23)
CALCIUM SERPL-MCNC: 9.6 MG/DL (ref 8.5–9.9)
CHLORIDE SERPL-SCNC: 90 MEQ/L (ref 95–107)
CO2 SERPL-SCNC: 21 MEQ/L (ref 20–31)
CREAT SERPL-MCNC: 5.88 MG/DL (ref 0.5–0.9)
EKG ATRIAL RATE: 84 BPM
EKG P-R INTERVAL: 190 MS
EKG Q-T INTERVAL: 412 MS
EKG QRS DURATION: 146 MS
EKG QTC CALCULATION (BAZETT): 486 MS
EKG R AXIS: -64 DEGREES
EKG T AXIS: 151 DEGREES
EKG VENTRICULAR RATE: 84 BPM
EOSINOPHIL # BLD: 0 K/UL (ref 0–0.7)
EOSINOPHIL NFR BLD: 2.5 %
ERYTHROCYTE [DISTWIDTH] IN BLOOD BY AUTOMATED COUNT: 17.7 % (ref 11.5–14.5)
FIBRINOGEN PPP-MCNC: 250 MG/DL (ref 262–562)
GLOBULIN SER CALC-MCNC: 2.1 G/DL (ref 2.3–3.5)
GLUCOSE BLD-MCNC: 159 MG/DL (ref 70–99)
GLUCOSE BLD-MCNC: 217 MG/DL (ref 70–99)
GLUCOSE BLD-MCNC: 323 MG/DL (ref 70–99)
GLUCOSE SERPL-MCNC: 300 MG/DL (ref 70–99)
HCT VFR BLD AUTO: 26.5 % (ref 37–47)
HCT VFR BLD AUTO: 26.6 % (ref 37–47)
HCT VFR BLD AUTO: 26.9 % (ref 37–47)
HGB BLD-MCNC: 8.7 G/DL (ref 12–16)
HGB BLD-MCNC: 8.7 G/DL (ref 12–16)
HGB BLD-MCNC: 8.8 G/DL (ref 12–16)
INR PPP: 5.2
INR PPP: >10
LYMPHOCYTES # BLD: 0.6 K/UL (ref 1–4.8)
LYMPHOCYTES NFR BLD: 5 %
MACROCYTES BLD QL SMEAR: ABNORMAL
MCH RBC QN AUTO: 33.5 PG (ref 27–31.3)
MCHC RBC AUTO-ENTMCNC: 32.8 % (ref 33–37)
MCV RBC AUTO: 101.9 FL (ref 79.4–94.8)
MONOCYTES # BLD: 0.3 K/UL (ref 0.2–0.8)
MONOCYTES NFR BLD: 2.8 %
NEUTROPHILS # BLD: 10.7 K/UL (ref 1.4–6.5)
NEUTS SEG NFR BLD: 92 %
NRBC BLD-RTO: 1 /100 WBC
PERFORMED ON: ABNORMAL
PLATELET # BLD AUTO: 158 K/UL (ref 130–400)
PLATELET BLD QL SMEAR: ADEQUATE
POIKILOCYTOSIS BLD QL SMEAR: ABNORMAL
POLYCHROMASIA BLD QL SMEAR: ABNORMAL
POTASSIUM SERPL-SCNC: 5.1 MEQ/L (ref 3.4–4.9)
PROT SERPL-MCNC: 6.6 G/DL (ref 6.3–8)
PROTHROMBIN TIME: 47.1 SEC (ref 12.3–14.9)
PROTHROMBIN TIME: >120 SEC (ref 12.3–14.9)
RBC # BLD AUTO: 2.6 M/UL (ref 4.2–5.4)
SMUDGE CELLS BLD QL SMEAR: 2.9
SODIUM SERPL-SCNC: 132 MEQ/L (ref 135–144)
WBC # BLD AUTO: 11.6 K/UL (ref 4.8–10.8)

## 2024-04-16 PROCEDURE — 85025 COMPLETE CBC W/AUTO DIFF WBC: CPT

## 2024-04-16 PROCEDURE — 85014 HEMATOCRIT: CPT

## 2024-04-16 PROCEDURE — A4216 STERILE WATER/SALINE, 10 ML: HCPCS | Performed by: EMERGENCY MEDICINE

## 2024-04-16 PROCEDURE — 86850 RBC ANTIBODY SCREEN: CPT

## 2024-04-16 PROCEDURE — 6360000002 HC RX W HCPCS: Performed by: STUDENT IN AN ORGANIZED HEALTH CARE EDUCATION/TRAINING PROGRAM

## 2024-04-16 PROCEDURE — 2580000003 HC RX 258: Performed by: NURSE PRACTITIONER

## 2024-04-16 PROCEDURE — 85018 HEMOGLOBIN: CPT

## 2024-04-16 PROCEDURE — 85384 FIBRINOGEN ACTIVITY: CPT

## 2024-04-16 PROCEDURE — 6360000002 HC RX W HCPCS: Performed by: EMERGENCY MEDICINE

## 2024-04-16 PROCEDURE — 93005 ELECTROCARDIOGRAM TRACING: CPT | Performed by: EMERGENCY MEDICINE

## 2024-04-16 PROCEDURE — 6370000000 HC RX 637 (ALT 250 FOR IP): Performed by: STUDENT IN AN ORGANIZED HEALTH CARE EDUCATION/TRAINING PROGRAM

## 2024-04-16 PROCEDURE — 85610 PROTHROMBIN TIME: CPT

## 2024-04-16 PROCEDURE — 99285 EMERGENCY DEPT VISIT HI MDM: CPT

## 2024-04-16 PROCEDURE — 99223 1ST HOSP IP/OBS HIGH 75: CPT | Performed by: NURSE PRACTITIONER

## 2024-04-16 PROCEDURE — 94640 AIRWAY INHALATION TREATMENT: CPT

## 2024-04-16 PROCEDURE — 80053 COMPREHEN METABOLIC PANEL: CPT

## 2024-04-16 PROCEDURE — 30283B1 TRANSFUSION OF NONAUTOLOGOUS 4-FACTOR PROTHROMBIN COMPLEX CONCENTRATE INTO VEIN, PERCUTANEOUS APPROACH: ICD-10-PCS | Performed by: STUDENT IN AN ORGANIZED HEALTH CARE EDUCATION/TRAINING PROGRAM

## 2024-04-16 PROCEDURE — 86901 BLOOD TYPING SEROLOGIC RH(D): CPT

## 2024-04-16 PROCEDURE — 2580000003 HC RX 258: Performed by: EMERGENCY MEDICINE

## 2024-04-16 PROCEDURE — 5A1D70Z PERFORMANCE OF URINARY FILTRATION, INTERMITTENT, LESS THAN 6 HOURS PER DAY: ICD-10-PCS | Performed by: INTERNAL MEDICINE

## 2024-04-16 PROCEDURE — 86900 BLOOD TYPING SEROLOGIC ABO: CPT

## 2024-04-16 PROCEDURE — 6370000000 HC RX 637 (ALT 250 FOR IP): Performed by: NURSE PRACTITIONER

## 2024-04-16 PROCEDURE — 74176 CT ABD & PELVIS W/O CONTRAST: CPT

## 2024-04-16 PROCEDURE — C9113 INJ PANTOPRAZOLE SODIUM, VIA: HCPCS | Performed by: EMERGENCY MEDICINE

## 2024-04-16 PROCEDURE — 93010 ELECTROCARDIOGRAM REPORT: CPT | Performed by: INTERNAL MEDICINE

## 2024-04-16 PROCEDURE — 96374 THER/PROPH/DIAG INJ IV PUSH: CPT

## 2024-04-16 PROCEDURE — 2000000000 HC ICU R&B

## 2024-04-16 PROCEDURE — 36415 COLL VENOUS BLD VENIPUNCTURE: CPT

## 2024-04-16 PROCEDURE — 6360000002 HC RX W HCPCS: Performed by: NURSE PRACTITIONER

## 2024-04-16 PROCEDURE — 2580000003 HC RX 258: Performed by: STUDENT IN AN ORGANIZED HEALTH CARE EDUCATION/TRAINING PROGRAM

## 2024-04-16 PROCEDURE — 99291 CRITICAL CARE FIRST HOUR: CPT | Performed by: INTERNAL MEDICINE

## 2024-04-16 PROCEDURE — C9113 INJ PANTOPRAZOLE SODIUM, VIA: HCPCS | Performed by: NURSE PRACTITIONER

## 2024-04-16 RX ORDER — ACETAMINOPHEN 160 MG
2000 TABLET,DISINTEGRATING ORAL DAILY
COMMUNITY

## 2024-04-16 RX ORDER — SODIUM CHLORIDE 0.9 % (FLUSH) 0.9 %
5-40 SYRINGE (ML) INJECTION PRN
Status: DISCONTINUED | OUTPATIENT
Start: 2024-04-16 | End: 2024-05-01 | Stop reason: HOSPADM

## 2024-04-16 RX ORDER — PANTOPRAZOLE SODIUM 20 MG/1
20 TABLET, DELAYED RELEASE ORAL DAILY
COMMUNITY

## 2024-04-16 RX ORDER — ACETAMINOPHEN 325 MG/1
650 TABLET ORAL EVERY 6 HOURS PRN
Status: DISCONTINUED | OUTPATIENT
Start: 2024-04-16 | End: 2024-05-01 | Stop reason: HOSPADM

## 2024-04-16 RX ORDER — 0.9 % SODIUM CHLORIDE 0.9 %
1000 INTRAVENOUS SOLUTION INTRAVENOUS ONCE
Status: COMPLETED | OUTPATIENT
Start: 2024-04-16 | End: 2024-04-16

## 2024-04-16 RX ORDER — ACETAMINOPHEN 650 MG/1
650 SUPPOSITORY RECTAL EVERY 6 HOURS PRN
Status: DISCONTINUED | OUTPATIENT
Start: 2024-04-16 | End: 2024-05-01 | Stop reason: HOSPADM

## 2024-04-16 RX ORDER — ONDANSETRON 2 MG/ML
4 INJECTION INTRAMUSCULAR; INTRAVENOUS EVERY 6 HOURS PRN
Status: DISCONTINUED | OUTPATIENT
Start: 2024-04-16 | End: 2024-05-01 | Stop reason: HOSPADM

## 2024-04-16 RX ORDER — ISOSORBIDE MONONITRATE 30 MG/1
120 TABLET, EXTENDED RELEASE ORAL DAILY
COMMUNITY

## 2024-04-16 RX ORDER — AMOXICILLIN 500 MG/1
500 CAPSULE ORAL DAILY
COMMUNITY

## 2024-04-16 RX ORDER — HYDROXYZINE HYDROCHLORIDE 25 MG/1
25 TABLET, FILM COATED ORAL 2 TIMES DAILY
Status: DISCONTINUED | OUTPATIENT
Start: 2024-04-16 | End: 2024-05-01 | Stop reason: HOSPADM

## 2024-04-16 RX ORDER — SODIUM CHLORIDE 9 MG/ML
50 INJECTION, SOLUTION INTRAVENOUS ONCE
Status: COMPLETED | OUTPATIENT
Start: 2024-04-16 | End: 2024-04-16

## 2024-04-16 RX ORDER — SODIUM CHLORIDE 9 MG/ML
INJECTION, SOLUTION INTRAVENOUS PRN
Status: DISCONTINUED | OUTPATIENT
Start: 2024-04-16 | End: 2024-05-01 | Stop reason: HOSPADM

## 2024-04-16 RX ORDER — GLUCAGON 1 MG/ML
1 KIT INJECTION PRN
Status: DISCONTINUED | OUTPATIENT
Start: 2024-04-16 | End: 2024-05-01 | Stop reason: HOSPADM

## 2024-04-16 RX ORDER — ATORVASTATIN CALCIUM 40 MG/1
40 TABLET, FILM COATED ORAL NIGHTLY
Status: DISCONTINUED | OUTPATIENT
Start: 2024-04-16 | End: 2024-05-01 | Stop reason: HOSPADM

## 2024-04-16 RX ORDER — ONDANSETRON 4 MG/1
4 TABLET, ORALLY DISINTEGRATING ORAL EVERY 8 HOURS PRN
Status: DISCONTINUED | OUTPATIENT
Start: 2024-04-16 | End: 2024-05-01 | Stop reason: HOSPADM

## 2024-04-16 RX ORDER — CEFAZOLIN 1 G/1
1000 INJECTION, POWDER, FOR SOLUTION INTRAVENOUS EVERY 8 HOURS
COMMUNITY

## 2024-04-16 RX ORDER — ALBUMIN (HUMAN) 12.5 G/50ML
25 SOLUTION INTRAVENOUS DAILY PRN
Status: DISCONTINUED | OUTPATIENT
Start: 2024-04-16 | End: 2024-05-01 | Stop reason: HOSPADM

## 2024-04-16 RX ORDER — HYDROCORTISONE 5 MG/1
5 TABLET ORAL NIGHTLY
COMMUNITY

## 2024-04-16 RX ORDER — NYSTATIN 100000 [USP'U]/G
POWDER TOPICAL DAILY PRN
COMMUNITY

## 2024-04-16 RX ORDER — SEVELAMER CARBONATE 800 MG/1
800 TABLET, FILM COATED ORAL 3 TIMES DAILY
Status: DISCONTINUED | OUTPATIENT
Start: 2024-04-16 | End: 2024-04-25 | Stop reason: CLARIF

## 2024-04-16 RX ORDER — BUDESONIDE AND FORMOTEROL FUMARATE DIHYDRATE 160; 4.5 UG/1; UG/1
2 AEROSOL RESPIRATORY (INHALATION)
Status: DISCONTINUED | OUTPATIENT
Start: 2024-04-16 | End: 2024-05-01 | Stop reason: HOSPADM

## 2024-04-16 RX ORDER — ARIPIPRAZOLE 5 MG/1
5 TABLET ORAL DAILY
Status: DISCONTINUED | OUTPATIENT
Start: 2024-04-17 | End: 2024-05-01 | Stop reason: HOSPADM

## 2024-04-16 RX ORDER — AMOXICILLIN 500 MG/1
500 CAPSULE ORAL DAILY
Status: DISCONTINUED | OUTPATIENT
Start: 2024-04-17 | End: 2024-05-01 | Stop reason: HOSPADM

## 2024-04-16 RX ORDER — DEXTROSE MONOHYDRATE 100 MG/ML
INJECTION, SOLUTION INTRAVENOUS CONTINUOUS PRN
Status: DISCONTINUED | OUTPATIENT
Start: 2024-04-16 | End: 2024-05-01 | Stop reason: HOSPADM

## 2024-04-16 RX ORDER — SODIUM CHLORIDE 0.9 % (FLUSH) 0.9 %
5-40 SYRINGE (ML) INJECTION EVERY 12 HOURS SCHEDULED
Status: DISCONTINUED | OUTPATIENT
Start: 2024-04-16 | End: 2024-05-01 | Stop reason: HOSPADM

## 2024-04-16 RX ORDER — LANOLIN ALCOHOL/MO/W.PET/CERES
3 CREAM (GRAM) TOPICAL NIGHTLY PRN
Status: DISCONTINUED | OUTPATIENT
Start: 2024-04-16 | End: 2024-05-01 | Stop reason: HOSPADM

## 2024-04-16 RX ORDER — INSULIN LISPRO 100 [IU]/ML
0-16 INJECTION, SOLUTION INTRAVENOUS; SUBCUTANEOUS EVERY 4 HOURS
Status: DISCONTINUED | OUTPATIENT
Start: 2024-04-16 | End: 2024-04-21

## 2024-04-16 RX ORDER — PHYTONADIONE 10 MG/ML
10 INJECTION, EMULSION INTRAMUSCULAR; INTRAVENOUS; SUBCUTANEOUS ONCE
Status: COMPLETED | OUTPATIENT
Start: 2024-04-16 | End: 2024-04-16

## 2024-04-16 RX ADMIN — MIDODRINE HYDROCHLORIDE 15 MG: 5 TABLET ORAL at 16:05

## 2024-04-16 RX ADMIN — OCTREOTIDE ACETATE 25 MCG/HR: 500 INJECTION, SOLUTION INTRAVENOUS; SUBCUTANEOUS at 16:44

## 2024-04-16 RX ADMIN — ONDANSETRON 4 MG: 2 INJECTION INTRAMUSCULAR; INTRAVENOUS at 22:25

## 2024-04-16 RX ADMIN — Medication 10 ML: at 20:01

## 2024-04-16 RX ADMIN — HYDROCORTISONE SODIUM SUCCINATE 50 MG: 100 INJECTION, POWDER, FOR SOLUTION INTRAMUSCULAR; INTRAVENOUS at 23:07

## 2024-04-16 RX ADMIN — INSULIN LISPRO 4 UNITS: 100 INJECTION, SOLUTION INTRAVENOUS; SUBCUTANEOUS at 20:01

## 2024-04-16 RX ADMIN — SODIUM CHLORIDE 50 ML: 9 INJECTION, SOLUTION INTRAVENOUS at 11:58

## 2024-04-16 RX ADMIN — INSULIN LISPRO 12 UNITS: 100 INJECTION, SOLUTION INTRAVENOUS; SUBCUTANEOUS at 14:52

## 2024-04-16 RX ADMIN — PANTOPRAZOLE SODIUM 8 MG/HR: 40 INJECTION, POWDER, FOR SOLUTION INTRAVENOUS at 16:40

## 2024-04-16 RX ADMIN — PHYTONADIONE 10 MG: 10 INJECTION, EMULSION INTRAMUSCULAR; INTRAVENOUS; SUBCUTANEOUS at 11:43

## 2024-04-16 RX ADMIN — HYDROCORTISONE SODIUM SUCCINATE 50 MG: 100 INJECTION, POWDER, FOR SOLUTION INTRAMUSCULAR; INTRAVENOUS at 16:07

## 2024-04-16 RX ADMIN — PROTHROMBIN, COAGULATION FACTOR VII HUMAN, COAGULATION FACTOR IX HUMAN, COAGULATION FACTOR X HUMAN, PROTEIN C, PROTEIN S HUMAN, AND WATER 2000 UNITS: KIT at 11:42

## 2024-04-16 RX ADMIN — SODIUM CHLORIDE 1000 ML: 9 INJECTION, SOLUTION INTRAVENOUS at 09:21

## 2024-04-16 RX ADMIN — TIOTROPIUM BROMIDE INHALATION SPRAY 2 PUFF: 3.12 SPRAY, METERED RESPIRATORY (INHALATION) at 17:40

## 2024-04-16 RX ADMIN — CEFTRIAXONE SODIUM 1000 MG: 1 INJECTION, POWDER, FOR SOLUTION INTRAMUSCULAR; INTRAVENOUS at 16:11

## 2024-04-16 RX ADMIN — PANTOPRAZOLE SODIUM 40 MG: 40 INJECTION, POWDER, FOR SOLUTION INTRAVENOUS at 09:29

## 2024-04-16 ASSESSMENT — PAIN SCALES - GENERAL
PAINLEVEL_OUTOF10: 0

## 2024-04-16 ASSESSMENT — PAIN - FUNCTIONAL ASSESSMENT: PAIN_FUNCTIONAL_ASSESSMENT: NONE - DENIES PAIN

## 2024-04-16 NOTE — ED NOTES
Covering lunch break for [place].  
Dr. Burton called back at [phone]  
Dr. Pelletier called back at [phone]  
Dressing unwrapped at this time d/t saturation   Wound pin point size with oozing blood   New dressing applied   Dr peterson aware   
GI paged at 1841  
Hosp paged at 9302  
ICU will pull  
Pt emesis blood at this time. States that she has not been vomiting blood up until now   
Pt had large bowel movement at this time   Bowel was dark red in color   Dr. Henderson aware   
Pt stable, awake, a&ox4, skin w/d/pink, pt resting in bed, 0 distress, 0 c/o at this time.  
  melatonin 3 MG TABS tablet Take 1 tablet by mouth nightly as needed Indications: Trouble Sleeping    ProviderFelicita MD   albuterol (PROVENTIL) 2.5 MG/0.5ML NEBU nebulizer solution Take 0.5 mLs by nebulization every 4 hours as needed for Wheezing or Shortness of Breath    ProviderFelicita MD   insulin lispro (HUMALOG) 100 UNIT/ML injection vial Inject 0-4 Units into the skin nightly Indications: Type 2 Diabetes Inject as per sliding scale: If 201-250=1u; 251-300=2u; 301-350=3u; 351-400=4u.  Call MD/NP if >400.    ProviderFelicita MD b complex-C-folic acid (NEPHROCAPS) 1 MG capsule Take 1 capsule by mouth daily Indications: Dialysis Dependent Chronic Kidney Failure    ProviderFelicita MD   pantoprazole (PROTONIX) 20 MG tablet Take 1 tablet by mouth daily Indications: Gastroesophageal Reflux Disease  4/16/24  ProviderFelicita MD   nitroGLYCERIN (NITROSTAT) 0.4 MG SL tablet up to max of 3 total doses. If no relief after 1 dose, call 911.  Patient taking differently: Place 1 tablet under the tongue every 5 minutes as needed for Chest pain up to max of 3 total doses. If no relief after 1 dose, call 911. 11/15/22   Morteza Avitia MD   hydrOXYzine HCl (ATARAX) 25 MG tablet Take 1 tablet by mouth 2 times daily Indications: Allergy 10/19/22   ProviderFelicita MD   SURE COMFORT PEN NEEDLES 30G X 8 MM MISC USE AS DIRECTED FIVE TIMES A DAILY 10/18/22   Andrei Nino MD   LANTUS SOLOSTAR 100 UNIT/ML injection pen INJECT 55 UNITS SUBCUTANEOUSLY AT BEDTIME 10/3/22   Andrei Nino MD   ARIPiprazole (ABILIFY) 5 MG tablet TAKE 1 TABLET BY MOUTH ONCE DAILY  Patient taking differently: Take 1 tablet by mouth daily TAKE 1 TABLET BY MOUTH ONCE DAILY 8/17/22   Yasmine Rodirguez MD   sertraline (ZOLOFT) 50 MG tablet Take 1 tablet by mouth nightly  Patient taking differently: Take 1 tablet by mouth nightly Indications: Depression 7/16/22   Jennifer Joyce DO   atorvastatin (LIPITOR) 40 MG tablet

## 2024-04-16 NOTE — H&P
Not on file   Social History Narrative    Disabled dt depression and low back pain, lives in [place]    Dtr Angelina is close by and is her paid caregiver via waiver services    HD via [place] transit, Tues, Thur, Sat.    Son is in the home and has CP and is total care-and is also cared for by a waiver by Paulina.    Pt was born in [place], one of 5-including a twin sister Mechelle Hunter, very ill in 2018, dec 2019    Moved to [place], , 2 children, one son (disabled with CP) and one daughter Paulina    Worked at [employer], as a nurse's aide Disabled due to mental illness and back pain    Lived at [facility], was discharged, returned to independent living in 2017 in the daughter's house and has adjusted well    One son and one daughter, live in the same house with patient, Michelle pays the rent    [employer] reading ([employer])             10/11/2021 [facility] updates; patient lives with her daughter son-in-law and 2 grandchildren and patient's handicapped son.  Per daughter, her brother is blind, MRDD, CP multiple health issues.  Daughter's  is patient's legal guardian.    Patient has hemodialysis Tuesday Thursday and Saturday.  Patient's bedroom is on main floor with a half bath.  Daughter walks patient upstairs once weekly for full bath.  Patient is using her walker in the home.  Patient has a hospital bed in the home.     Social Determinants of Health     Financial Resource Strain: Low Risk  (7/29/2022)    Overall Financial Resource Strain (CARDIA)     Difficulty of Paying Living Expenses: Not hard at all   Food Insecurity: No Food Insecurity (3/29/2024)    Hunger Vital Sign     Worried About Running Out of Food in the Last Year: Never true     Ran Out of Food in the Last Year: Never true   Transportation Needs: No Transportation Needs (3/29/2024)    PRAPARE - Transportation     Lack of Transportation (Medical): No     Lack of Transportation (Non-Medical): No   Physical

## 2024-04-16 NOTE — ED TRIAGE NOTES
Pt arrived by EMS from [facility].  pt reports GI bleed x1 week.   Pt denies any pain and reports SOB starting now. Placed on 2L for comfort    Pt is a post arrest patient. Arrested on Saturday.   Pt was D/C from [facility] yesterday. Pt has blood in stool.   Pt has uncontrolled bleeding in left arm from needle prick. Pt alert and oriented x4.

## 2024-04-16 NOTE — ED PROVIDER NOTES
[facility] ED  eMERGENCY dEPARTMENT eNCOUnter      Pt Name: Michelle Le  MRN: [id number]  Birthdate 1960  Date of evaluation: 4/16/2024  Provider: Mg Henderson MD    CHIEF COMPLAINT       Chief Complaint   Patient presents with    Rectal Bleeding     X1 week          HISTORY OF PRESENT ILLNESS   (Location/Symptom, Timing/Onset,Context/Setting, Quality, Duration, Modifying Factors, Severity)  Note limiting factors.   Michelle Le is a 64 y.o. female who presents to the emergency department for evaluation of rectal bleeding.  Patient has significant past medical history including cardiac disease, diabetes with diabetic complications, stage IV kidney disease, recent hospitalization with sepsis and cardiac arrest.  Discharged here on 4/12 and now returning on 4/16 from [facility] where she was found to have rectal bleeding the last few days.  No associated abdominal pain.  She has chronic bruising and oozing from a left forearm wound currently on Eliquis.  Mildly hypotensive prehospital with a blood pressure mid 80s responded to fluids.  Upon arrival here she vomited a coffee-ground emesis consistent with blood.  Denies current pain or lightheadedness.  She feels chronically fatigued    HPI    NursingNotes were reviewed.    REVIEW OF SYSTEMS    (2-9 systems for level 4, 10 or more for level 5)     Review of Systems   Constitutional:  Positive for fatigue. Negative for chills and diaphoresis.   HENT:  Negative for congestion, ear pain, mouth sores and sore throat.    Eyes:  Negative for photophobia and discharge.   Respiratory:  Negative for cough, chest tightness, shortness of breath and wheezing.    Cardiovascular:  Negative for chest pain and palpitations.   Gastrointestinal:  Positive for blood in stool, nausea and vomiting. Negative for abdominal distention and abdominal pain.   Endocrine: Negative for cold intolerance.   Genitourinary:  Negative for difficulty urinating.

## 2024-04-17 PROBLEM — Z78.9 FULL CODE STATUS: Status: ACTIVE | Noted: 2024-04-17

## 2024-04-17 PROBLEM — Z71.9 HEALTH EDUCATION/COUNSELING: Status: ACTIVE | Noted: 2024-04-17

## 2024-04-17 LAB
ALBUMIN SERPL-MCNC: 4.6 G/DL (ref 3.5–4.6)
ALP SERPL-CCNC: 79 U/L (ref 40–130)
ALT SERPL-CCNC: <5 U/L (ref 0–33)
ANION GAP SERPL CALCULATED.3IONS-SCNC: 18 MEQ/L (ref 9–15)
ANION GAP SERPL CALCULATED.3IONS-SCNC: 21 MEQ/L (ref 9–15)
APTT PPP: 44.1 SEC (ref 24.4–36.8)
APTT PPP: 52.8 SEC (ref 24.4–36.8)
AST SERPL-CCNC: 15 U/L (ref 0–35)
BASOPHILS # BLD: 0 K/UL (ref 0–0.2)
BASOPHILS NFR BLD: 0.3 %
BILIRUB SERPL-MCNC: 0.9 MG/DL (ref 0.2–0.7)
BUN SERPL-MCNC: 24 MG/DL (ref 8–23)
BUN SERPL-MCNC: 57 MG/DL (ref 8–23)
CALCIUM SERPL-MCNC: 10.1 MG/DL (ref 8.5–9.9)
CALCIUM SERPL-MCNC: 9.9 MG/DL (ref 8.5–9.9)
CHLORIDE SERPL-SCNC: 90 MEQ/L (ref 95–107)
CHLORIDE SERPL-SCNC: 92 MEQ/L (ref 95–107)
CO2 SERPL-SCNC: 21 MEQ/L (ref 20–31)
CO2 SERPL-SCNC: 27 MEQ/L (ref 20–31)
CREAT SERPL-MCNC: 3.64 MG/DL (ref 0.5–0.9)
CREAT SERPL-MCNC: 6.69 MG/DL (ref 0.5–0.9)
EOSINOPHIL # BLD: 0.1 K/UL (ref 0–0.7)
EOSINOPHIL NFR BLD: 0.6 %
ERYTHROCYTE [DISTWIDTH] IN BLOOD BY AUTOMATED COUNT: 17.8 % (ref 11.5–14.5)
FIBRINOGEN PPP-MCNC: 234 MG/DL (ref 262–562)
GLOBULIN SER CALC-MCNC: 2.3 G/DL (ref 2.3–3.5)
GLUCOSE BLD-MCNC: 117 MG/DL (ref 70–99)
GLUCOSE BLD-MCNC: 134 MG/DL (ref 70–99)
GLUCOSE BLD-MCNC: 143 MG/DL (ref 70–99)
GLUCOSE BLD-MCNC: 143 MG/DL (ref 70–99)
GLUCOSE BLD-MCNC: 183 MG/DL (ref 70–99)
GLUCOSE BLD-MCNC: 215 MG/DL (ref 70–99)
GLUCOSE BLD-MCNC: 93 MG/DL (ref 70–99)
GLUCOSE SERPL-MCNC: 177 MG/DL (ref 70–99)
GLUCOSE SERPL-MCNC: 93 MG/DL (ref 70–99)
HCT VFR BLD AUTO: 25.1 % (ref 37–47)
HCT VFR BLD AUTO: 25.8 % (ref 37–47)
HCT VFR BLD AUTO: 26 % (ref 37–47)
HCT VFR BLD AUTO: 26.2 % (ref 37–47)
HCT VFR BLD AUTO: 26.4 % (ref 37–47)
HGB BLD-MCNC: 8.3 G/DL (ref 12–16)
HGB BLD-MCNC: 8.6 G/DL (ref 12–16)
HGB BLD-MCNC: 8.6 G/DL (ref 12–16)
HGB BLD-MCNC: 8.7 G/DL (ref 12–16)
INR PPP: 3.1
INR PPP: 5.3
LYMPHOCYTES # BLD: 1.2 K/UL (ref 1–4.8)
LYMPHOCYTES NFR BLD: 8.8 %
MAGNESIUM SERPL-MCNC: 2.2 MG/DL (ref 1.7–2.4)
MCH RBC QN AUTO: 34 PG (ref 27–31.3)
MCHC RBC AUTO-ENTMCNC: 33.2 % (ref 33–37)
MCV RBC AUTO: 102.3 FL (ref 79.4–94.8)
MONOCYTES # BLD: 0.7 K/UL (ref 0.2–0.8)
MONOCYTES NFR BLD: 4.7 %
NEUTROPHILS # BLD: 11.7 K/UL (ref 1.4–6.5)
NEUTS SEG NFR BLD: 83.9 %
PERFORMED ON: ABNORMAL
PERFORMED ON: NORMAL
PLATELET # BLD AUTO: 182 K/UL (ref 130–400)
POTASSIUM SERPL-SCNC: 4 MEQ/L (ref 3.4–4.9)
POTASSIUM SERPL-SCNC: 5.7 MEQ/L (ref 3.4–4.9)
PROT SERPL-MCNC: 6.9 G/DL (ref 6.3–8)
PROTHROMBIN TIME: 32 SEC (ref 12.3–14.9)
PROTHROMBIN TIME: 47.9 SEC (ref 12.3–14.9)
RBC # BLD AUTO: 2.56 M/UL (ref 4.2–5.4)
RETICS # AUTO: 0.21 M/CUMM (ref 0.02–0.11)
RETICS/RBC NFR: 7.9 % (ref 0.6–2.2)
SODIUM SERPL-SCNC: 134 MEQ/L (ref 135–144)
SODIUM SERPL-SCNC: 135 MEQ/L (ref 135–144)
WBC # BLD AUTO: 14 K/UL (ref 4.8–10.8)

## 2024-04-17 PROCEDURE — 85046 RETICYTE/HGB CONCENTRATE: CPT

## 2024-04-17 PROCEDURE — 82728 ASSAY OF FERRITIN: CPT

## 2024-04-17 PROCEDURE — 6360000002 HC RX W HCPCS: Performed by: NURSE PRACTITIONER

## 2024-04-17 PROCEDURE — 6360000002 HC RX W HCPCS: Performed by: STUDENT IN AN ORGANIZED HEALTH CARE EDUCATION/TRAINING PROGRAM

## 2024-04-17 PROCEDURE — 36415 COLL VENOUS BLD VENIPUNCTURE: CPT

## 2024-04-17 PROCEDURE — 2580000003 HC RX 258: Performed by: STUDENT IN AN ORGANIZED HEALTH CARE EDUCATION/TRAINING PROGRAM

## 2024-04-17 PROCEDURE — 94640 AIRWAY INHALATION TREATMENT: CPT

## 2024-04-17 PROCEDURE — 6370000000 HC RX 637 (ALT 250 FOR IP): Performed by: INTERNAL MEDICINE

## 2024-04-17 PROCEDURE — 2500000003 HC RX 250 WO HCPCS: Performed by: NURSE PRACTITIONER

## 2024-04-17 PROCEDURE — 82607 VITAMIN B-12: CPT

## 2024-04-17 PROCEDURE — 85018 HEMOGLOBIN: CPT

## 2024-04-17 PROCEDURE — 1210000000 HC MED SURG R&B

## 2024-04-17 PROCEDURE — P9047 ALBUMIN (HUMAN), 25%, 50ML: HCPCS | Performed by: INTERNAL MEDICINE

## 2024-04-17 PROCEDURE — 83540 ASSAY OF IRON: CPT

## 2024-04-17 PROCEDURE — 6360000002 HC RX W HCPCS: Performed by: INTERNAL MEDICINE

## 2024-04-17 PROCEDURE — 6370000000 HC RX 637 (ALT 250 FOR IP): Performed by: STUDENT IN AN ORGANIZED HEALTH CARE EDUCATION/TRAINING PROGRAM

## 2024-04-17 PROCEDURE — 85384 FIBRINOGEN ACTIVITY: CPT

## 2024-04-17 PROCEDURE — 94761 N-INVAS EAR/PLS OXIMETRY MLT: CPT

## 2024-04-17 PROCEDURE — 97162 PT EVAL MOD COMPLEX 30 MIN: CPT

## 2024-04-17 PROCEDURE — 6370000000 HC RX 637 (ALT 250 FOR IP): Performed by: NURSE PRACTITIONER

## 2024-04-17 PROCEDURE — 99222 1ST HOSP IP/OBS MODERATE 55: CPT

## 2024-04-17 PROCEDURE — 83550 IRON BINDING TEST: CPT

## 2024-04-17 PROCEDURE — 85730 THROMBOPLASTIN TIME PARTIAL: CPT

## 2024-04-17 PROCEDURE — 85025 COMPLETE CBC W/AUTO DIFF WBC: CPT

## 2024-04-17 PROCEDURE — 99232 SBSQ HOSP IP/OBS MODERATE 35: CPT | Performed by: INTERNAL MEDICINE

## 2024-04-17 PROCEDURE — 85014 HEMATOCRIT: CPT

## 2024-04-17 PROCEDURE — 2580000003 HC RX 258: Performed by: NURSE PRACTITIONER

## 2024-04-17 PROCEDURE — 85610 PROTHROMBIN TIME: CPT

## 2024-04-17 PROCEDURE — 82746 ASSAY OF FOLIC ACID SERUM: CPT

## 2024-04-17 PROCEDURE — 97167 OT EVAL HIGH COMPLEX 60 MIN: CPT

## 2024-04-17 PROCEDURE — 83735 ASSAY OF MAGNESIUM: CPT

## 2024-04-17 PROCEDURE — C9113 INJ PANTOPRAZOLE SODIUM, VIA: HCPCS | Performed by: NURSE PRACTITIONER

## 2024-04-17 PROCEDURE — 80053 COMPREHEN METABOLIC PANEL: CPT

## 2024-04-17 PROCEDURE — 2700000000 HC OXYGEN THERAPY PER DAY

## 2024-04-17 PROCEDURE — 99233 SBSQ HOSP IP/OBS HIGH 50: CPT | Performed by: NURSE PRACTITIONER

## 2024-04-17 RX ORDER — MIDODRINE HYDROCHLORIDE 5 MG/1
5 TABLET ORAL ONCE
Status: COMPLETED | OUTPATIENT
Start: 2024-04-17 | End: 2024-04-17

## 2024-04-17 RX ORDER — MIDODRINE HYDROCHLORIDE 10 MG/1
20 TABLET ORAL
Status: DISCONTINUED | OUTPATIENT
Start: 2024-04-17 | End: 2024-05-01 | Stop reason: HOSPADM

## 2024-04-17 RX ADMIN — ALBUMIN (HUMAN) 25 G: 0.25 INJECTION, SOLUTION INTRAVENOUS at 09:42

## 2024-04-17 RX ADMIN — SEVELAMER CARBONATE 800 MG: 800 TABLET, FILM COATED ORAL at 13:46

## 2024-04-17 RX ADMIN — PANTOPRAZOLE SODIUM 8 MG/HR: 40 INJECTION, POWDER, FOR SOLUTION INTRAVENOUS at 02:43

## 2024-04-17 RX ADMIN — PANTOPRAZOLE SODIUM 8 MG/HR: 40 INJECTION, POWDER, FOR SOLUTION INTRAVENOUS at 23:07

## 2024-04-17 RX ADMIN — ONDANSETRON 4 MG: 2 INJECTION INTRAMUSCULAR; INTRAVENOUS at 08:22

## 2024-04-17 RX ADMIN — OCTREOTIDE ACETATE 25 MCG/HR: 500 INJECTION, SOLUTION INTRAVENOUS; SUBCUTANEOUS at 12:05

## 2024-04-17 RX ADMIN — Medication 10 ML: at 08:42

## 2024-04-17 RX ADMIN — INSULIN LISPRO 4 UNITS: 100 INJECTION, SOLUTION INTRAVENOUS; SUBCUTANEOUS at 01:28

## 2024-04-17 RX ADMIN — HYDROCORTISONE SODIUM SUCCINATE 50 MG: 100 INJECTION, POWDER, FOR SOLUTION INTRAMUSCULAR; INTRAVENOUS at 21:58

## 2024-04-17 RX ADMIN — Medication 10 ML: at 20:22

## 2024-04-17 RX ADMIN — BUDESONIDE AND FORMOTEROL FUMARATE DIHYDRATE 2 PUFF: 160; 4.5 AEROSOL RESPIRATORY (INHALATION) at 16:05

## 2024-04-17 RX ADMIN — ARIPIPRAZOLE 5 MG: 5 TABLET ORAL at 09:02

## 2024-04-17 RX ADMIN — MIDODRINE HYDROCHLORIDE 20 MG: 5 TABLET ORAL at 15:39

## 2024-04-17 RX ADMIN — MIDODRINE HYDROCHLORIDE 20 MG: 5 TABLET ORAL at 09:49

## 2024-04-17 RX ADMIN — HYDROCORTISONE SODIUM SUCCINATE 50 MG: 100 INJECTION, POWDER, FOR SOLUTION INTRAMUSCULAR; INTRAVENOUS at 15:39

## 2024-04-17 RX ADMIN — BUDESONIDE AND FORMOTEROL FUMARATE DIHYDRATE 2 PUFF: 160; 4.5 AEROSOL RESPIRATORY (INHALATION) at 04:00

## 2024-04-17 RX ADMIN — PANTOPRAZOLE SODIUM 8 MG/HR: 40 INJECTION, POWDER, FOR SOLUTION INTRAVENOUS at 12:04

## 2024-04-17 RX ADMIN — MIDODRINE HYDROCHLORIDE 15 MG: 5 TABLET ORAL at 07:14

## 2024-04-17 RX ADMIN — Medication: at 12:45

## 2024-04-17 RX ADMIN — MIDODRINE HYDROCHLORIDE 5 MG: 5 TABLET ORAL at 09:49

## 2024-04-17 RX ADMIN — HYDROCORTISONE SODIUM SUCCINATE 50 MG: 100 INJECTION, POWDER, FOR SOLUTION INTRAMUSCULAR; INTRAVENOUS at 09:02

## 2024-04-17 RX ADMIN — CEFTRIAXONE SODIUM 1000 MG: 1 INJECTION, POWDER, FOR SOLUTION INTRAMUSCULAR; INTRAVENOUS at 15:43

## 2024-04-17 RX ADMIN — TIOTROPIUM BROMIDE INHALATION SPRAY 2 PUFF: 3.12 SPRAY, METERED RESPIRATORY (INHALATION) at 04:00

## 2024-04-17 NOTE — FLOWSHEET NOTE
Tx complete 2300 ml uf removed tolerated well       04/17/24 1045   Vital Signs   BP (!) 88/36   Pulse 86   Respirations 16   SpO2 100 %   Weight - Scale 98.9 kg (218 lb 0.6 oz)   Percent Weight Change -3.23   Post-Hemodialysis Assessment   Post-Treatment Procedures Blood returned;Access bleeding time > 10 minutes   Machine Disinfection Process Acid/Vinegar Clean;Bleach;Exterior Machine Disinfection   Rinseback Volume (ml) 300 ml   Blood Volume Processed (Liters) 79.6 L   Dialyzer Clearance Lightly streaked   Duration of Treatment (minutes) 210 minutes   Heparin Amount Administered During Treatment (mL) 0 mL   Hemodialysis Intake (ml) 800 ml   Hemodialysis Output (ml) 3100 ml   NET Removed (ml) 2300   Tolerated Treatment Good   Bilateral Breath Sounds Diminished   Edema Generalized   Observations & Evaluations   Level of Consciousness 0   Heart Rhythm Regular   Respiratory Quality/Effort Unlabored   O2 Device None (Room air)   Skin Condition/Temp Dry;Warm   Abdomen Inspection Distended   Bowel Sounds (All Quadrants) Present   Comments tx complete

## 2024-04-18 PROBLEM — S61.402A OPEN WOUND OF LEFT HAND WITHOUT FOREIGN BODY: Status: ACTIVE | Noted: 2024-04-18

## 2024-04-18 LAB
ALBUMIN SERPL-MCNC: 4.8 G/DL (ref 3.5–4.6)
ALP SERPL-CCNC: 79 U/L (ref 40–130)
ALT SERPL-CCNC: <5 U/L (ref 0–33)
ANION GAP SERPL CALCULATED.3IONS-SCNC: 24 MEQ/L (ref 9–15)
AST SERPL-CCNC: 12 U/L (ref 0–35)
BASOPHILS # BLD: 0 K/UL (ref 0–0.2)
BASOPHILS NFR BLD: 0.1 %
BILIRUB SERPL-MCNC: 1.1 MG/DL (ref 0.2–0.7)
BUN SERPL-MCNC: 37 MG/DL (ref 8–23)
CALCIUM SERPL-MCNC: 10.2 MG/DL (ref 8.5–9.9)
CHLORIDE SERPL-SCNC: 90 MEQ/L (ref 95–107)
CO2 SERPL-SCNC: 23 MEQ/L (ref 20–31)
CREAT SERPL-MCNC: 4.59 MG/DL (ref 0.5–0.9)
EOSINOPHIL # BLD: 0 K/UL (ref 0–0.7)
EOSINOPHIL NFR BLD: 0.1 %
ERYTHROCYTE [DISTWIDTH] IN BLOOD BY AUTOMATED COUNT: 19 % (ref 11.5–14.5)
FERRITIN: 1331 NG/ML (ref 13–150)
FOLATE: 14 NG/ML (ref 4.8–24.2)
GLOBULIN SER CALC-MCNC: 2.2 G/DL (ref 2.3–3.5)
GLUCOSE BLD-MCNC: 173 MG/DL (ref 70–99)
GLUCOSE BLD-MCNC: 174 MG/DL (ref 70–99)
GLUCOSE BLD-MCNC: 181 MG/DL (ref 70–99)
GLUCOSE BLD-MCNC: 181 MG/DL (ref 70–99)
GLUCOSE BLD-MCNC: 192 MG/DL (ref 70–99)
GLUCOSE BLD-MCNC: 197 MG/DL (ref 70–99)
GLUCOSE BLD-MCNC: 217 MG/DL (ref 70–99)
GLUCOSE SERPL-MCNC: 199 MG/DL (ref 70–99)
HBV SURFACE AG SERPL QL IA: NORMAL
HCT VFR BLD AUTO: 26.1 % (ref 37–47)
HCT VFR BLD AUTO: 26.3 % (ref 37–47)
HCT VFR BLD AUTO: 27 % (ref 37–47)
HGB BLD-MCNC: 8.6 G/DL (ref 12–16)
HGB BLD-MCNC: 8.7 G/DL (ref 12–16)
HGB BLD-MCNC: 8.7 G/DL (ref 12–16)
INR PPP: 2.5
IRON % SATURATION: 88 % (ref 20–55)
IRON: 120 UG/DL (ref 37–145)
LYMPHOCYTES # BLD: 1.3 K/UL (ref 1–4.8)
LYMPHOCYTES NFR BLD: 8.3 %
MAGNESIUM SERPL-MCNC: 2.1 MG/DL (ref 1.7–2.4)
MCH RBC QN AUTO: 34.1 PG (ref 27–31.3)
MCHC RBC AUTO-ENTMCNC: 33.1 % (ref 33–37)
MCV RBC AUTO: 103.1 FL (ref 79.4–94.8)
MONOCYTES # BLD: 1 K/UL (ref 0.2–0.8)
MONOCYTES NFR BLD: 6.8 %
NEUTROPHILS # BLD: 12.7 K/UL (ref 1.4–6.5)
NEUTS SEG NFR BLD: 83.3 %
PERFORMED ON: ABNORMAL
PLATELET # BLD AUTO: 198 K/UL (ref 130–400)
POTASSIUM SERPL-SCNC: 4.7 MEQ/L (ref 3.4–4.9)
PROT SERPL-MCNC: 7 G/DL (ref 6.3–8)
PROTHROMBIN TIME: 26.9 SEC (ref 12.3–14.9)
RBC # BLD AUTO: 2.55 M/UL (ref 4.2–5.4)
SODIUM SERPL-SCNC: 137 MEQ/L (ref 135–144)
TOTAL IRON BINDING CAPACITY: 137 UG/DL (ref 250–450)
UNSATURATED IRON BINDING CAPACITY: 17 UG/DL (ref 112–347)
VITAMIN B-12: 1544 PG/ML (ref 232–1245)
WBC # BLD AUTO: 15.2 K/UL (ref 4.8–10.8)

## 2024-04-18 PROCEDURE — 80053 COMPREHEN METABOLIC PANEL: CPT

## 2024-04-18 PROCEDURE — 6360000002 HC RX W HCPCS: Performed by: STUDENT IN AN ORGANIZED HEALTH CARE EDUCATION/TRAINING PROGRAM

## 2024-04-18 PROCEDURE — 6370000000 HC RX 637 (ALT 250 FOR IP): Performed by: INTERNAL MEDICINE

## 2024-04-18 PROCEDURE — 83735 ASSAY OF MAGNESIUM: CPT

## 2024-04-18 PROCEDURE — 99222 1ST HOSP IP/OBS MODERATE 55: CPT

## 2024-04-18 PROCEDURE — 97110 THERAPEUTIC EXERCISES: CPT

## 2024-04-18 PROCEDURE — 87340 HEPATITIS B SURFACE AG IA: CPT

## 2024-04-18 PROCEDURE — C9113 INJ PANTOPRAZOLE SODIUM, VIA: HCPCS | Performed by: NURSE PRACTITIONER

## 2024-04-18 PROCEDURE — 85025 COMPLETE CBC W/AUTO DIFF WBC: CPT

## 2024-04-18 PROCEDURE — 3E033XZ INTRODUCTION OF VASOPRESSOR INTO PERIPHERAL VEIN, PERCUTANEOUS APPROACH: ICD-10-PCS | Performed by: STUDENT IN AN ORGANIZED HEALTH CARE EDUCATION/TRAINING PROGRAM

## 2024-04-18 PROCEDURE — 85018 HEMOGLOBIN: CPT

## 2024-04-18 PROCEDURE — 6360000002 HC RX W HCPCS: Performed by: NURSE PRACTITIONER

## 2024-04-18 PROCEDURE — 6370000000 HC RX 637 (ALT 250 FOR IP): Performed by: STUDENT IN AN ORGANIZED HEALTH CARE EDUCATION/TRAINING PROGRAM

## 2024-04-18 PROCEDURE — 2580000003 HC RX 258: Performed by: NURSE PRACTITIONER

## 2024-04-18 PROCEDURE — 85014 HEMATOCRIT: CPT

## 2024-04-18 PROCEDURE — 85610 PROTHROMBIN TIME: CPT

## 2024-04-18 PROCEDURE — 99291 CRITICAL CARE FIRST HOUR: CPT | Performed by: INTERNAL MEDICINE

## 2024-04-18 PROCEDURE — 94640 AIRWAY INHALATION TREATMENT: CPT

## 2024-04-18 PROCEDURE — 2000000000 HC ICU R&B

## 2024-04-18 PROCEDURE — 2580000003 HC RX 258: Performed by: STUDENT IN AN ORGANIZED HEALTH CARE EDUCATION/TRAINING PROGRAM

## 2024-04-18 PROCEDURE — 86704 HEP B CORE ANTIBODY TOTAL: CPT

## 2024-04-18 PROCEDURE — 6370000000 HC RX 637 (ALT 250 FOR IP): Performed by: NURSE PRACTITIONER

## 2024-04-18 PROCEDURE — 2500000003 HC RX 250 WO HCPCS: Performed by: INTERNAL MEDICINE

## 2024-04-18 PROCEDURE — 94761 N-INVAS EAR/PLS OXIMETRY MLT: CPT

## 2024-04-18 PROCEDURE — 2580000003 HC RX 258: Performed by: INTERNAL MEDICINE

## 2024-04-18 RX ORDER — 0.9 % SODIUM CHLORIDE 0.9 %
500 INTRAVENOUS SOLUTION INTRAVENOUS ONCE
Status: COMPLETED | OUTPATIENT
Start: 2024-04-18 | End: 2024-04-18

## 2024-04-18 RX ORDER — PHYTONADIONE 5 MG/1
5 TABLET ORAL ONCE
Status: COMPLETED | OUTPATIENT
Start: 2024-04-18 | End: 2024-04-18

## 2024-04-18 RX ORDER — BISACODYL 5 MG/1
5 TABLET, DELAYED RELEASE ORAL DAILY PRN
Status: DISCONTINUED | OUTPATIENT
Start: 2024-04-18 | End: 2024-05-01 | Stop reason: HOSPADM

## 2024-04-18 RX ORDER — SERTRALINE HYDROCHLORIDE 25 MG/1
25 TABLET, FILM COATED ORAL NIGHTLY
Status: DISCONTINUED | OUTPATIENT
Start: 2024-04-18 | End: 2024-05-01 | Stop reason: HOSPADM

## 2024-04-18 RX ORDER — NOREPINEPHRINE BITARTRATE 0.06 MG/ML
1-100 INJECTION, SOLUTION INTRAVENOUS CONTINUOUS
Status: DISCONTINUED | OUTPATIENT
Start: 2024-04-18 | End: 2024-04-22

## 2024-04-18 RX ADMIN — HYDROCORTISONE SODIUM SUCCINATE 50 MG: 100 INJECTION, POWDER, FOR SOLUTION INTRAMUSCULAR; INTRAVENOUS at 16:28

## 2024-04-18 RX ADMIN — PANTOPRAZOLE SODIUM 8 MG/HR: 40 INJECTION, POWDER, FOR SOLUTION INTRAVENOUS at 13:11

## 2024-04-18 RX ADMIN — OCTREOTIDE ACETATE 25 MCG/HR: 500 INJECTION, SOLUTION INTRAVENOUS; SUBCUTANEOUS at 13:12

## 2024-04-18 RX ADMIN — Medication 3 MG: at 20:40

## 2024-04-18 RX ADMIN — MIDODRINE HYDROCHLORIDE 20 MG: 5 TABLET ORAL at 12:22

## 2024-04-18 RX ADMIN — MIDODRINE HYDROCHLORIDE 20 MG: 5 TABLET ORAL at 16:28

## 2024-04-18 RX ADMIN — MIDODRINE HYDROCHLORIDE 20 MG: 5 TABLET ORAL at 09:56

## 2024-04-18 RX ADMIN — BISACODYL 5 MG: 5 TABLET, COATED ORAL at 12:22

## 2024-04-18 RX ADMIN — SEVELAMER CARBONATE 800 MG: 800 TABLET, FILM COATED ORAL at 09:56

## 2024-04-18 RX ADMIN — SODIUM CHLORIDE 500 ML: 9 INJECTION, SOLUTION INTRAVENOUS at 12:31

## 2024-04-18 RX ADMIN — TIOTROPIUM BROMIDE INHALATION SPRAY 2 PUFF: 3.12 SPRAY, METERED RESPIRATORY (INHALATION) at 07:05

## 2024-04-18 RX ADMIN — INSULIN LISPRO 4 UNITS: 100 INJECTION, SOLUTION INTRAVENOUS; SUBCUTANEOUS at 09:56

## 2024-04-18 RX ADMIN — HYDROCORTISONE SODIUM SUCCINATE 50 MG: 100 INJECTION, POWDER, FOR SOLUTION INTRAMUSCULAR; INTRAVENOUS at 09:57

## 2024-04-18 RX ADMIN — Medication 10 ML: at 20:41

## 2024-04-18 RX ADMIN — ATORVASTATIN CALCIUM 40 MG: 40 TABLET, FILM COATED ORAL at 20:40

## 2024-04-18 RX ADMIN — HYDROXYZINE HYDROCHLORIDE 25 MG: 25 TABLET ORAL at 09:56

## 2024-04-18 RX ADMIN — CEFTRIAXONE SODIUM 1000 MG: 1 INJECTION, POWDER, FOR SOLUTION INTRAMUSCULAR; INTRAVENOUS at 16:30

## 2024-04-18 RX ADMIN — HYDROXYZINE HYDROCHLORIDE 25 MG: 25 TABLET ORAL at 20:40

## 2024-04-18 RX ADMIN — PHYTONADIONE 5 MG: 5 TABLET ORAL at 12:22

## 2024-04-18 RX ADMIN — BUDESONIDE AND FORMOTEROL FUMARATE DIHYDRATE 2 PUFF: 160; 4.5 AEROSOL RESPIRATORY (INHALATION) at 16:46

## 2024-04-18 RX ADMIN — Medication 10 ML: at 09:57

## 2024-04-18 RX ADMIN — NOREPINEPHRINE BITARTRATE 5 MCG/MIN: 1 INJECTION, SOLUTION, CONCENTRATE INTRAVENOUS at 16:16

## 2024-04-18 RX ADMIN — BUDESONIDE AND FORMOTEROL FUMARATE DIHYDRATE 2 PUFF: 160; 4.5 AEROSOL RESPIRATORY (INHALATION) at 07:04

## 2024-04-18 RX ADMIN — SEVELAMER CARBONATE 800 MG: 800 TABLET, FILM COATED ORAL at 20:41

## 2024-04-18 RX ADMIN — ARIPIPRAZOLE 5 MG: 5 TABLET ORAL at 09:56

## 2024-04-18 RX ADMIN — SERTRALINE HYDROCHLORIDE 25 MG: 25 TABLET ORAL at 20:41

## 2024-04-18 ASSESSMENT — PAIN SCALES - GENERAL
PAINLEVEL_OUTOF10: 0
PAINLEVEL_OUTOF10: 0

## 2024-04-18 NOTE — SIGNIFICANT EVENT
Rapid called out of concern for hypotension. MAP was 53 on my arrival with patient on RA, HR 57. She was mentating appropriately but did report feeling mildly lightheaded. Confirmed that Hb throughout the morning has been stable. Patient's BP runs low at baseline despite high-dose midodrine, as she recently was transferred from ICU and was even requiring Levophed at her LTAC. 500 cc was given, with minimal improvement in BP. Cautious with IVF in this context as patient has ESRD and already on exam has significant edema. Will start levophed and transfer to ICU. Continue stress dose steroids.       CTC: 32 minutes      Addendum:     Discussed with intensivist Dr. Rojo, who has previously treated patient in ICU. We discussed how options are limited to boost blood pressure given her hx ESRD, bilateral pitting edema, midodrine, and stress dose steroids. Based on her chronically low blood pressure he did not want to take patient to ICU. MAP goal will be > 50 at this time. Bedside RN and RN  made aware

## 2024-04-19 LAB
ALBUMIN SERPL-MCNC: 4.7 G/DL (ref 3.5–4.6)
ALP SERPL-CCNC: 81 U/L (ref 40–130)
ALT SERPL-CCNC: <5 U/L (ref 0–33)
ANION GAP SERPL CALCULATED.3IONS-SCNC: 22 MEQ/L (ref 9–15)
APTT PPP: 40.7 SEC (ref 24.4–36.8)
AST SERPL-CCNC: 14 U/L (ref 0–35)
BASOPHILS # BLD: 0 K/UL (ref 0–0.2)
BASOPHILS NFR BLD: 0.1 %
BILIRUB SERPL-MCNC: 1.1 MG/DL (ref 0.2–0.7)
BUN SERPL-MCNC: 57 MG/DL (ref 8–23)
CALCIUM SERPL-MCNC: 9.8 MG/DL (ref 8.5–9.9)
CHLORIDE SERPL-SCNC: 90 MEQ/L (ref 95–107)
CO2 SERPL-SCNC: 23 MEQ/L (ref 20–31)
CREAT SERPL-MCNC: 5.59 MG/DL (ref 0.5–0.9)
EOSINOPHIL # BLD: 0 K/UL (ref 0–0.7)
EOSINOPHIL NFR BLD: 0.1 %
ERYTHROCYTE [DISTWIDTH] IN BLOOD BY AUTOMATED COUNT: 19.8 % (ref 11.5–14.5)
FIBRINOGEN PPP-MCNC: 234 MG/DL (ref 262–562)
GLOBULIN SER CALC-MCNC: 2.5 G/DL (ref 2.3–3.5)
GLUCOSE BLD-MCNC: 152 MG/DL (ref 70–99)
GLUCOSE BLD-MCNC: 165 MG/DL (ref 70–99)
GLUCOSE BLD-MCNC: 184 MG/DL (ref 70–99)
GLUCOSE BLD-MCNC: 225 MG/DL (ref 70–99)
GLUCOSE SERPL-MCNC: 199 MG/DL (ref 70–99)
HBV CORE AB SERPL QL IA: NEGATIVE
HCT VFR BLD AUTO: 27.3 % (ref 37–47)
HCT VFR BLD AUTO: 27.8 % (ref 37–47)
HCT VFR BLD AUTO: 29 % (ref 37–47)
HCT VFR BLD AUTO: 29.1 % (ref 37–47)
HGB BLD-MCNC: 9.1 G/DL (ref 12–16)
HGB BLD-MCNC: 9.2 G/DL (ref 12–16)
HGB BLD-MCNC: 9.5 G/DL (ref 12–16)
HGB BLD-MCNC: 9.7 G/DL (ref 12–16)
INR PPP: 2.6
LYMPHOCYTES # BLD: 0.4 K/UL (ref 1–4.8)
LYMPHOCYTES NFR BLD: 2 %
MAGNESIUM SERPL-MCNC: 2.1 MG/DL (ref 1.7–2.4)
MCH RBC QN AUTO: 34 PG (ref 27–31.3)
MCHC RBC AUTO-ENTMCNC: 33.3 % (ref 33–37)
MCV RBC AUTO: 101.9 FL (ref 79.4–94.8)
MONOCYTES # BLD: 0.6 K/UL (ref 0.2–0.8)
MONOCYTES NFR BLD: 2.8 %
NEUTROPHILS # BLD: 18.1 K/UL (ref 1.4–6.5)
NEUTS SEG NFR BLD: 95 %
PERFORMED ON: ABNORMAL
PLATELET # BLD AUTO: 247 K/UL (ref 130–400)
PLATELET BLD QL SMEAR: ADEQUATE
POTASSIUM SERPL-SCNC: 4.8 MEQ/L (ref 3.4–4.9)
PROT SERPL-MCNC: 7.2 G/DL (ref 6.3–8)
PROTHROMBIN TIME: 27.9 SEC (ref 12.3–14.9)
RBC # BLD AUTO: 2.68 M/UL (ref 4.2–5.4)
SLIDE REVIEW: ABNORMAL
SODIUM SERPL-SCNC: 135 MEQ/L (ref 135–144)
WBC # BLD AUTO: 19.1 K/UL (ref 4.8–10.8)

## 2024-04-19 PROCEDURE — 6360000002 HC RX W HCPCS: Performed by: INTERNAL MEDICINE

## 2024-04-19 PROCEDURE — 6370000000 HC RX 637 (ALT 250 FOR IP): Performed by: INTERNAL MEDICINE

## 2024-04-19 PROCEDURE — 6370000000 HC RX 637 (ALT 250 FOR IP): Performed by: STUDENT IN AN ORGANIZED HEALTH CARE EDUCATION/TRAINING PROGRAM

## 2024-04-19 PROCEDURE — P9047 ALBUMIN (HUMAN), 25%, 50ML: HCPCS | Performed by: INTERNAL MEDICINE

## 2024-04-19 PROCEDURE — 85025 COMPLETE CBC W/AUTO DIFF WBC: CPT

## 2024-04-19 PROCEDURE — 85014 HEMATOCRIT: CPT

## 2024-04-19 PROCEDURE — 99291 CRITICAL CARE FIRST HOUR: CPT | Performed by: INTERNAL MEDICINE

## 2024-04-19 PROCEDURE — 85730 THROMBOPLASTIN TIME PARTIAL: CPT

## 2024-04-19 PROCEDURE — 85384 FIBRINOGEN ACTIVITY: CPT

## 2024-04-19 PROCEDURE — 97167 OT EVAL HIGH COMPLEX 60 MIN: CPT

## 2024-04-19 PROCEDURE — 90935 HEMODIALYSIS ONE EVALUATION: CPT

## 2024-04-19 PROCEDURE — 80053 COMPREHEN METABOLIC PANEL: CPT

## 2024-04-19 PROCEDURE — 2500000003 HC RX 250 WO HCPCS: Performed by: INTERNAL MEDICINE

## 2024-04-19 PROCEDURE — 97164 PT RE-EVAL EST PLAN CARE: CPT

## 2024-04-19 PROCEDURE — 85018 HEMOGLOBIN: CPT

## 2024-04-19 PROCEDURE — 99231 SBSQ HOSP IP/OBS SF/LOW 25: CPT

## 2024-04-19 PROCEDURE — 99232 SBSQ HOSP IP/OBS MODERATE 35: CPT | Performed by: NURSE PRACTITIONER

## 2024-04-19 PROCEDURE — 6360000002 HC RX W HCPCS: Performed by: STUDENT IN AN ORGANIZED HEALTH CARE EDUCATION/TRAINING PROGRAM

## 2024-04-19 PROCEDURE — 2000000000 HC ICU R&B

## 2024-04-19 PROCEDURE — 6370000000 HC RX 637 (ALT 250 FOR IP): Performed by: NURSE PRACTITIONER

## 2024-04-19 PROCEDURE — 2580000003 HC RX 258: Performed by: NURSE PRACTITIONER

## 2024-04-19 PROCEDURE — 36592 COLLECT BLOOD FROM PICC: CPT

## 2024-04-19 PROCEDURE — 2580000003 HC RX 258: Performed by: STUDENT IN AN ORGANIZED HEALTH CARE EDUCATION/TRAINING PROGRAM

## 2024-04-19 PROCEDURE — 83735 ASSAY OF MAGNESIUM: CPT

## 2024-04-19 PROCEDURE — 85610 PROTHROMBIN TIME: CPT

## 2024-04-19 PROCEDURE — 94640 AIRWAY INHALATION TREATMENT: CPT

## 2024-04-19 PROCEDURE — C9113 INJ PANTOPRAZOLE SODIUM, VIA: HCPCS | Performed by: NURSE PRACTITIONER

## 2024-04-19 PROCEDURE — 6360000002 HC RX W HCPCS: Performed by: NURSE PRACTITIONER

## 2024-04-19 PROCEDURE — 94761 N-INVAS EAR/PLS OXIMETRY MLT: CPT

## 2024-04-19 RX ADMIN — ONDANSETRON 4 MG: 2 INJECTION INTRAMUSCULAR; INTRAVENOUS at 08:21

## 2024-04-19 RX ADMIN — HYDROCORTISONE SODIUM SUCCINATE 50 MG: 100 INJECTION, POWDER, FOR SOLUTION INTRAMUSCULAR; INTRAVENOUS at 05:46

## 2024-04-19 RX ADMIN — OCTREOTIDE ACETATE 25 MCG/HR: 500 INJECTION, SOLUTION INTRAVENOUS; SUBCUTANEOUS at 06:59

## 2024-04-19 RX ADMIN — PANTOPRAZOLE SODIUM 8 MG/HR: 40 INJECTION, POWDER, FOR SOLUTION INTRAVENOUS at 00:15

## 2024-04-19 RX ADMIN — MIDODRINE HYDROCHLORIDE 20 MG: 5 TABLET ORAL at 12:27

## 2024-04-19 RX ADMIN — PANTOPRAZOLE SODIUM 8 MG/HR: 40 INJECTION, POWDER, FOR SOLUTION INTRAVENOUS at 20:32

## 2024-04-19 RX ADMIN — CEFTRIAXONE SODIUM 1000 MG: 1 INJECTION, POWDER, FOR SOLUTION INTRAMUSCULAR; INTRAVENOUS at 16:52

## 2024-04-19 RX ADMIN — HYDROXYZINE HYDROCHLORIDE 25 MG: 25 TABLET ORAL at 20:49

## 2024-04-19 RX ADMIN — BUDESONIDE AND FORMOTEROL FUMARATE DIHYDRATE 2 PUFF: 160; 4.5 AEROSOL RESPIRATORY (INHALATION) at 03:48

## 2024-04-19 RX ADMIN — Medication 10 ML: at 07:43

## 2024-04-19 RX ADMIN — SEVELAMER CARBONATE 800 MG: 800 TABLET, FILM COATED ORAL at 20:49

## 2024-04-19 RX ADMIN — SERTRALINE HYDROCHLORIDE 25 MG: 25 TABLET ORAL at 20:49

## 2024-04-19 RX ADMIN — HYDROCORTISONE SODIUM SUCCINATE 100 MG: 100 INJECTION, POWDER, FOR SOLUTION INTRAMUSCULAR; INTRAVENOUS at 16:47

## 2024-04-19 RX ADMIN — ARIPIPRAZOLE 5 MG: 5 TABLET ORAL at 07:41

## 2024-04-19 RX ADMIN — MIDODRINE HYDROCHLORIDE 20 MG: 5 TABLET ORAL at 05:46

## 2024-04-19 RX ADMIN — Medication 5 MCG/MIN: at 00:30

## 2024-04-19 RX ADMIN — INSULIN LISPRO 4 UNITS: 100 INJECTION, SOLUTION INTRAVENOUS; SUBCUTANEOUS at 00:29

## 2024-04-19 RX ADMIN — BUDESONIDE AND FORMOTEROL FUMARATE DIHYDRATE 2 PUFF: 160; 4.5 AEROSOL RESPIRATORY (INHALATION) at 14:57

## 2024-04-19 RX ADMIN — SEVELAMER CARBONATE 800 MG: 800 TABLET, FILM COATED ORAL at 07:40

## 2024-04-19 RX ADMIN — ONDANSETRON 4 MG: 2 INJECTION INTRAMUSCULAR; INTRAVENOUS at 00:17

## 2024-04-19 RX ADMIN — ALBUMIN (HUMAN) 25 G: 0.25 INJECTION, SOLUTION INTRAVENOUS at 09:08

## 2024-04-19 RX ADMIN — HYDROCORTISONE SODIUM SUCCINATE 50 MG: 100 INJECTION, POWDER, FOR SOLUTION INTRAMUSCULAR; INTRAVENOUS at 00:09

## 2024-04-19 RX ADMIN — SEVELAMER CARBONATE 800 MG: 800 TABLET, FILM COATED ORAL at 13:16

## 2024-04-19 RX ADMIN — PANTOPRAZOLE SODIUM 8 MG/HR: 40 INJECTION, POWDER, FOR SOLUTION INTRAVENOUS at 07:13

## 2024-04-19 RX ADMIN — HYDROXYZINE HYDROCHLORIDE 25 MG: 25 TABLET ORAL at 07:41

## 2024-04-19 RX ADMIN — ATORVASTATIN CALCIUM 40 MG: 40 TABLET, FILM COATED ORAL at 20:49

## 2024-04-19 RX ADMIN — TIOTROPIUM BROMIDE INHALATION SPRAY 2 PUFF: 3.12 SPRAY, METERED RESPIRATORY (INHALATION) at 03:48

## 2024-04-19 RX ADMIN — MIDODRINE HYDROCHLORIDE 20 MG: 5 TABLET ORAL at 16:47

## 2024-04-19 ASSESSMENT — PAIN SCALES - GENERAL
PAINLEVEL_OUTOF10: 0

## 2024-04-19 NOTE — DISCHARGE INSTR - COC
Continuity of Care Form    Patient Name: Michelle Le   :  1960  MRN:  [id number]    Admit date:  2024  Discharge date:  24    Code Status Order: Full Code   Advance Directives:     Admitting Physician:  Chan Rai MD  PCP: Adilene Terry MD    Discharging Nurse: ilir buchanan  [facility]  Discharging Unit Phone Number: [phone]    Emergency Contact:   Extended Emergency Contact Information  Primary Emergency Contact: Angelina Fagan   [facility]  Home Phone: [phone]  Work Phone: [phone]  Mobile Phone: [phone]  Relation: Child    Past Surgical History:  Past Surgical History:   Procedure Laterality Date    AV FISTULA CREATION Left 2023    [facility]     SECTION      x1    COLONOSCOPY  2014    Dr. Bello    CORONARY ANGIOPLASTY WITH STENT PLACEMENT      x1 Dr. Walker, Dr Borja 2018    CORONARY ANGIOPLASTY WITH STENT PLACEMENT  08/10/2021    [facility]    DIAGNOSTIC CARDIAC CATH LAB PROCEDURE  10/02/2019    DIALYSIS CATHETER INSERTION Left 2022    Tunneled Symetrex 15.5F x 23cm hemodialysis catheter inserted by Dr. White    DIALYSIS CATHETER INSERTION Left 2022    [id number] replamcent tunneld HD cath by Dr. White    HYSTERECTOMY, TOTAL ABDOMINAL (CERVIX REMOVED)      one ovary intact, Dr Fabian, menorrhagia    IR THROMBECTOMY PERCUT AVF  2022    IR THROMBECTOMY PERCUT AVF 2022 [facility] PROCEDURE    IR TUNNELED CATHETER PLACEMENT GREATER THAN 5 YEARS  2022    IR TUNNELED CATHETER PLACEMENT GREATER THAN 5 YEARS 6/3/2022 [facility] PROCEDURE    IR TUNNELED CATHETER PLACEMENT GREATER THAN 5 YEARS Left 2022 Dr. White exchanged to 15.5F x 28 cm.      15.5 fr by 23 cm Symetrex dialysis catheter inserted by Dr White    IR TUNNELED CATHETER PLACEMENT GREATER THAN 5 YEARS  2022    IR TUNNELED CATHETER PLACEMENT GREATER THAN 5 YEARS 2022 [facility]

## 2024-04-20 ENCOUNTER — APPOINTMENT (OUTPATIENT)
Dept: GENERAL RADIOLOGY | Age: 64
DRG: 813 | End: 2024-04-20
Payer: MEDICARE

## 2024-04-20 LAB
ALBUMIN SERPL-MCNC: 4.9 G/DL (ref 3.5–4.6)
ALP SERPL-CCNC: 81 U/L (ref 40–130)
ALT SERPL-CCNC: <5 U/L (ref 0–33)
ANION GAP SERPL CALCULATED.3IONS-SCNC: 28 MEQ/L (ref 9–15)
AST SERPL-CCNC: 14 U/L (ref 0–35)
BASOPHILS # BLD: 0 K/UL (ref 0–0.2)
BASOPHILS NFR BLD: 0.2 %
BILIRUB SERPL-MCNC: 1.2 MG/DL (ref 0.2–0.7)
BUN SERPL-MCNC: 46 MG/DL (ref 8–23)
CALCIUM SERPL-MCNC: 10 MG/DL (ref 8.5–9.9)
CHLORIDE SERPL-SCNC: 90 MEQ/L (ref 95–107)
CO2 SERPL-SCNC: 20 MEQ/L (ref 20–31)
CREAT SERPL-MCNC: 4.45 MG/DL (ref 0.5–0.9)
EOSINOPHIL # BLD: 0 K/UL (ref 0–0.7)
EOSINOPHIL NFR BLD: 0 %
ERYTHROCYTE [DISTWIDTH] IN BLOOD BY AUTOMATED COUNT: 20 % (ref 11.5–14.5)
GLOBULIN SER CALC-MCNC: 2.3 G/DL (ref 2.3–3.5)
GLUCOSE BLD-MCNC: 171 MG/DL (ref 70–99)
GLUCOSE BLD-MCNC: 193 MG/DL (ref 70–99)
GLUCOSE BLD-MCNC: 194 MG/DL (ref 70–99)
GLUCOSE BLD-MCNC: 205 MG/DL (ref 70–99)
GLUCOSE BLD-MCNC: 227 MG/DL (ref 70–99)
GLUCOSE SERPL-MCNC: 238 MG/DL (ref 70–99)
HCT VFR BLD AUTO: 27.6 % (ref 37–47)
HCT VFR BLD AUTO: 27.9 % (ref 37–47)
HCT VFR BLD AUTO: 29.4 % (ref 37–47)
HGB BLD-MCNC: 9.1 G/DL (ref 12–16)
HGB BLD-MCNC: 9.1 G/DL (ref 12–16)
HGB BLD-MCNC: 9.5 G/DL (ref 12–16)
INR PPP: 1.9
LYMPHOCYTES # BLD: 1.9 K/UL (ref 1–4.8)
LYMPHOCYTES NFR BLD: 8.5 %
MCH RBC QN AUTO: 33.8 PG (ref 27–31.3)
MCHC RBC AUTO-ENTMCNC: 32.3 % (ref 33–37)
MCV RBC AUTO: 104.6 FL (ref 79.4–94.8)
MONOCYTES # BLD: 2.2 K/UL (ref 0.2–0.8)
MONOCYTES NFR BLD: 10 %
NEUTROPHILS # BLD: 17.4 K/UL (ref 1.4–6.5)
NEUTS SEG NFR BLD: 80.1 %
PERFORMED ON: ABNORMAL
PLATELET # BLD AUTO: 277 K/UL (ref 130–400)
POTASSIUM SERPL-SCNC: 4.4 MEQ/L (ref 3.4–4.9)
PROCALCITONIN SERPL IA-MCNC: 0.88 NG/ML (ref 0–0.15)
PROT SERPL-MCNC: 7.2 G/DL (ref 6.3–8)
PROTHROMBIN TIME: 21.9 SEC (ref 12.3–14.9)
RBC # BLD AUTO: 2.81 M/UL (ref 4.2–5.4)
SODIUM SERPL-SCNC: 138 MEQ/L (ref 135–144)
WBC # BLD AUTO: 21.8 K/UL (ref 4.8–10.8)

## 2024-04-20 PROCEDURE — 94761 N-INVAS EAR/PLS OXIMETRY MLT: CPT

## 2024-04-20 PROCEDURE — 99291 CRITICAL CARE FIRST HOUR: CPT | Performed by: INTERNAL MEDICINE

## 2024-04-20 PROCEDURE — 6360000002 HC RX W HCPCS: Performed by: INTERNAL MEDICINE

## 2024-04-20 PROCEDURE — 71045 X-RAY EXAM CHEST 1 VIEW: CPT

## 2024-04-20 PROCEDURE — 2580000003 HC RX 258: Performed by: NURSE PRACTITIONER

## 2024-04-20 PROCEDURE — 6360000002 HC RX W HCPCS: Performed by: STUDENT IN AN ORGANIZED HEALTH CARE EDUCATION/TRAINING PROGRAM

## 2024-04-20 PROCEDURE — 6370000000 HC RX 637 (ALT 250 FOR IP): Performed by: INTERNAL MEDICINE

## 2024-04-20 PROCEDURE — 6360000002 HC RX W HCPCS: Performed by: NURSE PRACTITIONER

## 2024-04-20 PROCEDURE — 2700000000 HC OXYGEN THERAPY PER DAY

## 2024-04-20 PROCEDURE — 85025 COMPLETE CBC W/AUTO DIFF WBC: CPT

## 2024-04-20 PROCEDURE — 2580000003 HC RX 258: Performed by: STUDENT IN AN ORGANIZED HEALTH CARE EDUCATION/TRAINING PROGRAM

## 2024-04-20 PROCEDURE — 85610 PROTHROMBIN TIME: CPT

## 2024-04-20 PROCEDURE — 36592 COLLECT BLOOD FROM PICC: CPT

## 2024-04-20 PROCEDURE — 85018 HEMOGLOBIN: CPT

## 2024-04-20 PROCEDURE — 80053 COMPREHEN METABOLIC PANEL: CPT

## 2024-04-20 PROCEDURE — 94640 AIRWAY INHALATION TREATMENT: CPT

## 2024-04-20 PROCEDURE — 2000000000 HC ICU R&B

## 2024-04-20 PROCEDURE — 84145 PROCALCITONIN (PCT): CPT

## 2024-04-20 PROCEDURE — 85014 HEMATOCRIT: CPT

## 2024-04-20 PROCEDURE — 2500000003 HC RX 250 WO HCPCS: Performed by: INTERNAL MEDICINE

## 2024-04-20 PROCEDURE — 6370000000 HC RX 637 (ALT 250 FOR IP): Performed by: STUDENT IN AN ORGANIZED HEALTH CARE EDUCATION/TRAINING PROGRAM

## 2024-04-20 PROCEDURE — 87040 BLOOD CULTURE FOR BACTERIA: CPT

## 2024-04-20 PROCEDURE — 97535 SELF CARE MNGMENT TRAINING: CPT

## 2024-04-20 PROCEDURE — C9113 INJ PANTOPRAZOLE SODIUM, VIA: HCPCS | Performed by: NURSE PRACTITIONER

## 2024-04-20 PROCEDURE — 94760 N-INVAS EAR/PLS OXIMETRY 1: CPT

## 2024-04-20 PROCEDURE — 6370000000 HC RX 637 (ALT 250 FOR IP): Performed by: NURSE PRACTITIONER

## 2024-04-20 PROCEDURE — 2580000003 HC RX 258: Performed by: INTERNAL MEDICINE

## 2024-04-20 RX ORDER — BISACODYL 10 MG
10 SUPPOSITORY, RECTAL RECTAL DAILY PRN
Status: DISCONTINUED | OUTPATIENT
Start: 2024-04-20 | End: 2024-05-01 | Stop reason: HOSPADM

## 2024-04-20 RX ORDER — METOCLOPRAMIDE HYDROCHLORIDE 5 MG/ML
10 INJECTION INTRAMUSCULAR; INTRAVENOUS EVERY 6 HOURS
Status: DISCONTINUED | OUTPATIENT
Start: 2024-04-20 | End: 2024-04-29

## 2024-04-20 RX ADMIN — METOCLOPRAMIDE HYDROCHLORIDE 10 MG: 5 INJECTION INTRAMUSCULAR; INTRAVENOUS at 11:38

## 2024-04-20 RX ADMIN — PANTOPRAZOLE SODIUM 8 MG/HR: 40 INJECTION, POWDER, FOR SOLUTION INTRAVENOUS at 18:54

## 2024-04-20 RX ADMIN — Medication 10 ML: at 20:59

## 2024-04-20 RX ADMIN — HYDROCORTISONE SODIUM SUCCINATE 50 MG: 100 INJECTION, POWDER, FOR SOLUTION INTRAMUSCULAR; INTRAVENOUS at 20:35

## 2024-04-20 RX ADMIN — ATORVASTATIN CALCIUM 40 MG: 40 TABLET, FILM COATED ORAL at 20:35

## 2024-04-20 RX ADMIN — ONDANSETRON 4 MG: 2 INJECTION INTRAMUSCULAR; INTRAVENOUS at 03:31

## 2024-04-20 RX ADMIN — INSULIN LISPRO 4 UNITS: 100 INJECTION, SOLUTION INTRAVENOUS; SUBCUTANEOUS at 06:08

## 2024-04-20 RX ADMIN — BISACODYL 10 MG: 10 SUPPOSITORY RECTAL at 16:18

## 2024-04-20 RX ADMIN — TIOTROPIUM BROMIDE INHALATION SPRAY 2 PUFF: 3.12 SPRAY, METERED RESPIRATORY (INHALATION) at 03:30

## 2024-04-20 RX ADMIN — SERTRALINE HYDROCHLORIDE 25 MG: 25 TABLET ORAL at 20:35

## 2024-04-20 RX ADMIN — BUDESONIDE AND FORMOTEROL FUMARATE DIHYDRATE 2 PUFF: 160; 4.5 AEROSOL RESPIRATORY (INHALATION) at 15:45

## 2024-04-20 RX ADMIN — VANCOMYCIN HYDROCHLORIDE 2250 MG: 750 INJECTION, POWDER, LYOPHILIZED, FOR SOLUTION INTRAVENOUS at 10:15

## 2024-04-20 RX ADMIN — SODIUM CHLORIDE, PRESERVATIVE FREE 10 ML: 5 INJECTION INTRAVENOUS at 17:06

## 2024-04-20 RX ADMIN — Medication 4 MCG/MIN: at 13:51

## 2024-04-20 RX ADMIN — BUDESONIDE AND FORMOTEROL FUMARATE DIHYDRATE 2 PUFF: 160; 4.5 AEROSOL RESPIRATORY (INHALATION) at 03:30

## 2024-04-20 RX ADMIN — MIDODRINE HYDROCHLORIDE 20 MG: 5 TABLET ORAL at 17:06

## 2024-04-20 RX ADMIN — Medication 10 ML: at 10:16

## 2024-04-20 RX ADMIN — HYDROCORTISONE SODIUM SUCCINATE 100 MG: 100 INJECTION, POWDER, FOR SOLUTION INTRAMUSCULAR; INTRAVENOUS at 01:47

## 2024-04-20 RX ADMIN — INSULIN LISPRO 4 UNITS: 100 INJECTION, SOLUTION INTRAVENOUS; SUBCUTANEOUS at 20:55

## 2024-04-20 RX ADMIN — METOCLOPRAMIDE HYDROCHLORIDE 10 MG: 5 INJECTION INTRAMUSCULAR; INTRAVENOUS at 17:06

## 2024-04-20 RX ADMIN — PANTOPRAZOLE SODIUM 8 MG/HR: 40 INJECTION, POWDER, FOR SOLUTION INTRAVENOUS at 07:49

## 2024-04-20 RX ADMIN — CEFTRIAXONE SODIUM 1000 MG: 1 INJECTION, POWDER, FOR SOLUTION INTRAMUSCULAR; INTRAVENOUS at 16:18

## 2024-04-20 RX ADMIN — HYDROCORTISONE SODIUM SUCCINATE 50 MG: 100 INJECTION, POWDER, FOR SOLUTION INTRAMUSCULAR; INTRAVENOUS at 13:51

## 2024-04-20 RX ADMIN — ONDANSETRON 4 MG: 2 INJECTION INTRAMUSCULAR; INTRAVENOUS at 09:31

## 2024-04-20 RX ADMIN — SEVELAMER CARBONATE 800 MG: 800 TABLET, FILM COATED ORAL at 20:35

## 2024-04-20 RX ADMIN — HYDROXYZINE HYDROCHLORIDE 25 MG: 25 TABLET ORAL at 20:35

## 2024-04-20 RX ADMIN — MIDODRINE HYDROCHLORIDE 20 MG: 5 TABLET ORAL at 06:08

## 2024-04-20 NOTE — FLOWSHEET NOTE
Shift summary    0715 report at bedside. Pt had two episodes clear emesis with coffee ground appearing sediment in it. Pt reminded of NPO with SIPS only/ice chips. Pt voiced c/o not being able to have a BM since 4/16. Pt dry heaving/hiccuping/burping frequently.     A & Ox4 speech difficult to understand at times. HIGH/follows commands. MP SR/afib/aflutter does flip back and forth. Trace generalized nonpitting edema noted. Pulses palp, weaning levo gtt see MAR.     1000 pt requested to trial room air, sats remain above 92%.     I did speak with ICU team, reglan given with relief- pt stated no further nausea/burping at this time, passing gas. Awaiting GI for plan re: scope. Tolerating ice chips at this time.     1200 pt family at bedside.  at bedside per request to karyn. Electronically signed by Margaux Langford RN on 4/20/2024 at 1:18 PM    1500 pt had a few hours of rest. I spoke with pt RIKA Gallardo, updated. Plan for possible scope on Monday. Questions answered.     1530 okay for clear liquid diet per Dr. Hairston. Pt given diet pop- tolerated with no nausea/vomiting/dyspepsia.  pt reports less nausea however hesitant to take anything orally for her constipation.  Message to hospitalist, see orders.     1620 suppository given per  request.     1700 placed on bedpan per request. Took midodrine with no n/v. Electronically signed by Margaux Langford RN on 4/20/2024 at 5:38 PM    1800 complete chg bath and linen change done. No BM noted. Electronically signed by Margaux Langford RN on 4/20/2024 at 7:03 PM

## 2024-04-21 PROBLEM — R11.2 NAUSEA AND VOMITING: Status: ACTIVE | Noted: 2024-04-21

## 2024-04-21 PROBLEM — K92.0 COFFEE GROUND EMESIS: Status: ACTIVE | Noted: 2024-04-21

## 2024-04-21 LAB
ALBUMIN SERPL-MCNC: 4.6 G/DL (ref 3.5–4.6)
ALP SERPL-CCNC: 81 U/L (ref 40–130)
ALT SERPL-CCNC: <5 U/L (ref 0–33)
ANION GAP SERPL CALCULATED.3IONS-SCNC: 26 MEQ/L (ref 9–15)
APTT PPP: 33.7 SEC (ref 24.4–36.8)
AST SERPL-CCNC: 15 U/L (ref 0–35)
BASOPHILS # BLD: 0 K/UL (ref 0–0.2)
BASOPHILS NFR BLD: 0.1 %
BILIRUB SERPL-MCNC: 1.2 MG/DL (ref 0.2–0.7)
BUN SERPL-MCNC: 61 MG/DL (ref 8–23)
CALCIUM SERPL-MCNC: 9.8 MG/DL (ref 8.5–9.9)
CHLORIDE SERPL-SCNC: 90 MEQ/L (ref 95–107)
CO2 SERPL-SCNC: 19 MEQ/L (ref 20–31)
CREAT SERPL-MCNC: 5.23 MG/DL (ref 0.5–0.9)
EOSINOPHIL # BLD: 0 K/UL (ref 0–0.7)
EOSINOPHIL NFR BLD: 0 %
ERYTHROCYTE [DISTWIDTH] IN BLOOD BY AUTOMATED COUNT: 20.4 % (ref 11.5–14.5)
FIBRINOGEN PPP-MCNC: 261 MG/DL (ref 262–562)
GLOBULIN SER CALC-MCNC: 2.2 G/DL (ref 2.3–3.5)
GLUCOSE BLD-MCNC: 149 MG/DL (ref 70–99)
GLUCOSE BLD-MCNC: 169 MG/DL (ref 70–99)
GLUCOSE BLD-MCNC: 187 MG/DL (ref 70–99)
GLUCOSE BLD-MCNC: 213 MG/DL (ref 70–99)
GLUCOSE BLD-MCNC: 235 MG/DL (ref 70–99)
GLUCOSE BLD-MCNC: 272 MG/DL (ref 70–99)
GLUCOSE SERPL-MCNC: 206 MG/DL (ref 70–99)
HCT VFR BLD AUTO: 27.6 % (ref 37–47)
HCT VFR BLD AUTO: 28.7 % (ref 37–47)
HGB BLD-MCNC: 9.2 G/DL (ref 12–16)
HGB BLD-MCNC: 9.4 G/DL (ref 12–16)
INR PPP: 1.8
LYMPHOCYTES # BLD: 0.3 K/UL (ref 1–4.8)
LYMPHOCYTES NFR BLD: 2 %
MCH RBC QN AUTO: 34.1 PG (ref 27–31.3)
MCHC RBC AUTO-ENTMCNC: 32.8 % (ref 33–37)
MCV RBC AUTO: 104 FL (ref 79.4–94.8)
MONOCYTES # BLD: 0.3 K/UL (ref 0.2–0.8)
MONOCYTES NFR BLD: 1.9 %
NEUTROPHILS # BLD: 14.9 K/UL (ref 1.4–6.5)
NEUTS SEG NFR BLD: 96 %
NRBC BLD-RTO: 2 /100 WBC
PERFORMED ON: ABNORMAL
PLATELET # BLD AUTO: 175 K/UL (ref 130–400)
PLATELET BLD QL SMEAR: ABNORMAL
POTASSIUM SERPL-SCNC: 4.8 MEQ/L (ref 3.4–4.9)
PROT SERPL-MCNC: 6.8 G/DL (ref 6.3–8)
PROTHROMBIN TIME: 20.7 SEC (ref 12.3–14.9)
RBC # BLD AUTO: 2.76 M/UL (ref 4.2–5.4)
SLIDE REVIEW: ABNORMAL
SMUDGE CELLS BLD QL SMEAR: 10.6
SODIUM SERPL-SCNC: 135 MEQ/L (ref 135–144)
WBC # BLD AUTO: 15.5 K/UL (ref 4.8–10.8)

## 2024-04-21 PROCEDURE — 85610 PROTHROMBIN TIME: CPT

## 2024-04-21 PROCEDURE — 2580000003 HC RX 258: Performed by: STUDENT IN AN ORGANIZED HEALTH CARE EDUCATION/TRAINING PROGRAM

## 2024-04-21 PROCEDURE — 85730 THROMBOPLASTIN TIME PARTIAL: CPT

## 2024-04-21 PROCEDURE — 99291 CRITICAL CARE FIRST HOUR: CPT | Performed by: INTERNAL MEDICINE

## 2024-04-21 PROCEDURE — 85018 HEMOGLOBIN: CPT

## 2024-04-21 PROCEDURE — 85014 HEMATOCRIT: CPT

## 2024-04-21 PROCEDURE — 6370000000 HC RX 637 (ALT 250 FOR IP): Performed by: INTERNAL MEDICINE

## 2024-04-21 PROCEDURE — 2000000000 HC ICU R&B

## 2024-04-21 PROCEDURE — 2580000003 HC RX 258: Performed by: NURSE PRACTITIONER

## 2024-04-21 PROCEDURE — 6360000002 HC RX W HCPCS: Performed by: STUDENT IN AN ORGANIZED HEALTH CARE EDUCATION/TRAINING PROGRAM

## 2024-04-21 PROCEDURE — 6360000002 HC RX W HCPCS: Performed by: INTERNAL MEDICINE

## 2024-04-21 PROCEDURE — C9113 INJ PANTOPRAZOLE SODIUM, VIA: HCPCS | Performed by: NURSE PRACTITIONER

## 2024-04-21 PROCEDURE — 6370000000 HC RX 637 (ALT 250 FOR IP): Performed by: STUDENT IN AN ORGANIZED HEALTH CARE EDUCATION/TRAINING PROGRAM

## 2024-04-21 PROCEDURE — 99232 SBSQ HOSP IP/OBS MODERATE 35: CPT | Performed by: INTERNAL MEDICINE

## 2024-04-21 PROCEDURE — 85025 COMPLETE CBC W/AUTO DIFF WBC: CPT

## 2024-04-21 PROCEDURE — 6360000002 HC RX W HCPCS: Performed by: NURSE PRACTITIONER

## 2024-04-21 PROCEDURE — 85384 FIBRINOGEN ACTIVITY: CPT

## 2024-04-21 PROCEDURE — 80053 COMPREHEN METABOLIC PANEL: CPT

## 2024-04-21 PROCEDURE — 36592 COLLECT BLOOD FROM PICC: CPT

## 2024-04-21 PROCEDURE — 2700000000 HC OXYGEN THERAPY PER DAY

## 2024-04-21 PROCEDURE — 94640 AIRWAY INHALATION TREATMENT: CPT

## 2024-04-21 RX ORDER — INSULIN LISPRO 100 [IU]/ML
0-8 INJECTION, SOLUTION INTRAVENOUS; SUBCUTANEOUS
Status: DISCONTINUED | OUTPATIENT
Start: 2024-04-21 | End: 2024-04-22

## 2024-04-21 RX ORDER — POLYETHYLENE GLYCOL 3350 17 G/17G
17 POWDER, FOR SOLUTION ORAL 2 TIMES DAILY
Status: DISCONTINUED | OUTPATIENT
Start: 2024-04-21 | End: 2024-04-29

## 2024-04-21 RX ORDER — INSULIN LISPRO 100 [IU]/ML
0-4 INJECTION, SOLUTION INTRAVENOUS; SUBCUTANEOUS NIGHTLY
Status: DISCONTINUED | OUTPATIENT
Start: 2024-04-21 | End: 2024-04-22

## 2024-04-21 RX ADMIN — METOCLOPRAMIDE HYDROCHLORIDE 10 MG: 5 INJECTION INTRAMUSCULAR; INTRAVENOUS at 00:06

## 2024-04-21 RX ADMIN — Medication 10 ML: at 20:26

## 2024-04-21 RX ADMIN — METOCLOPRAMIDE HYDROCHLORIDE 10 MG: 5 INJECTION INTRAMUSCULAR; INTRAVENOUS at 05:40

## 2024-04-21 RX ADMIN — TIOTROPIUM BROMIDE INHALATION SPRAY 2 PUFF: 3.12 SPRAY, METERED RESPIRATORY (INHALATION) at 04:11

## 2024-04-21 RX ADMIN — INSULIN LISPRO 2 UNITS: 100 INJECTION, SOLUTION INTRAVENOUS; SUBCUTANEOUS at 17:09

## 2024-04-21 RX ADMIN — PANTOPRAZOLE SODIUM 8 MG/HR: 40 INJECTION, POWDER, FOR SOLUTION INTRAVENOUS at 16:52

## 2024-04-21 RX ADMIN — HYDROCORTISONE SODIUM SUCCINATE 50 MG: 100 INJECTION, POWDER, FOR SOLUTION INTRAMUSCULAR; INTRAVENOUS at 20:21

## 2024-04-21 RX ADMIN — MIDODRINE HYDROCHLORIDE 20 MG: 5 TABLET ORAL at 05:40

## 2024-04-21 RX ADMIN — MIDODRINE HYDROCHLORIDE 20 MG: 5 TABLET ORAL at 11:41

## 2024-04-21 RX ADMIN — BUDESONIDE AND FORMOTEROL FUMARATE DIHYDRATE 2 PUFF: 160; 4.5 AEROSOL RESPIRATORY (INHALATION) at 15:29

## 2024-04-21 RX ADMIN — ARIPIPRAZOLE 5 MG: 5 TABLET ORAL at 08:32

## 2024-04-21 RX ADMIN — MIDODRINE HYDROCHLORIDE 20 MG: 5 TABLET ORAL at 16:55

## 2024-04-21 RX ADMIN — METOCLOPRAMIDE HYDROCHLORIDE 10 MG: 5 INJECTION INTRAMUSCULAR; INTRAVENOUS at 16:56

## 2024-04-21 RX ADMIN — SEVELAMER CARBONATE 800 MG: 800 TABLET, FILM COATED ORAL at 20:20

## 2024-04-21 RX ADMIN — HYDROCORTISONE SODIUM SUCCINATE 50 MG: 100 INJECTION, POWDER, FOR SOLUTION INTRAMUSCULAR; INTRAVENOUS at 01:39

## 2024-04-21 RX ADMIN — HYDROXYZINE HYDROCHLORIDE 25 MG: 25 TABLET ORAL at 20:21

## 2024-04-21 RX ADMIN — SEVELAMER CARBONATE 800 MG: 800 TABLET, FILM COATED ORAL at 08:33

## 2024-04-21 RX ADMIN — PANTOPRAZOLE SODIUM 8 MG/HR: 40 INJECTION, POWDER, FOR SOLUTION INTRAVENOUS at 06:00

## 2024-04-21 RX ADMIN — SERTRALINE HYDROCHLORIDE 25 MG: 25 TABLET ORAL at 20:20

## 2024-04-21 RX ADMIN — HYDROXYZINE HYDROCHLORIDE 25 MG: 25 TABLET ORAL at 08:32

## 2024-04-21 RX ADMIN — HYDROCORTISONE SODIUM SUCCINATE 50 MG: 100 INJECTION, POWDER, FOR SOLUTION INTRAMUSCULAR; INTRAVENOUS at 16:56

## 2024-04-21 RX ADMIN — BISACODYL 5 MG: 5 TABLET, COATED ORAL at 08:32

## 2024-04-21 RX ADMIN — Medication 10 ML: at 08:35

## 2024-04-21 RX ADMIN — METOCLOPRAMIDE HYDROCHLORIDE 10 MG: 5 INJECTION INTRAMUSCULAR; INTRAVENOUS at 11:41

## 2024-04-21 RX ADMIN — SEVELAMER CARBONATE 800 MG: 800 TABLET, FILM COATED ORAL at 16:55

## 2024-04-21 RX ADMIN — INSULIN LISPRO 2 UNITS: 100 INJECTION, SOLUTION INTRAVENOUS; SUBCUTANEOUS at 08:47

## 2024-04-21 RX ADMIN — ATORVASTATIN CALCIUM 40 MG: 40 TABLET, FILM COATED ORAL at 20:20

## 2024-04-21 RX ADMIN — POLYETHYLENE GLYCOL 3350 17 G: 17 POWDER, FOR SOLUTION ORAL at 20:23

## 2024-04-21 RX ADMIN — HYDROCORTISONE SODIUM SUCCINATE 50 MG: 100 INJECTION, POWDER, FOR SOLUTION INTRAMUSCULAR; INTRAVENOUS at 08:33

## 2024-04-21 RX ADMIN — BUDESONIDE AND FORMOTEROL FUMARATE DIHYDRATE 2 PUFF: 160; 4.5 AEROSOL RESPIRATORY (INHALATION) at 04:11

## 2024-04-21 RX ADMIN — CEFTRIAXONE SODIUM 1000 MG: 1 INJECTION, POWDER, FOR SOLUTION INTRAMUSCULAR; INTRAVENOUS at 17:03

## 2024-04-21 ASSESSMENT — PAIN SCALES - GENERAL: PAINLEVEL_OUTOF10: 0

## 2024-04-21 NOTE — FLOWSHEET NOTE
Shift summary    A &o X 4. Tolerating clear liquids. Pt denies nausea, no vomiting, no hiccups. Denies abd cramping.      MP aflutter. Generalized edema/anasarca noted, increased compared to yesterday. Bp marginal, on low dose levophed, see MAR.     LS c/dim on RA no cough no SOB, slight SOBOE when flat with no desaturations.     Skin- ecchymosis noted scattered. Pt requested dressing change to scabs on L hand and arm. Small mepliex placed per request. New mepliex placed to coccyx, minimal blanchable redness noted to area. Interdry placed to abd fold and underneath breasts.     Complete CHG bath given, hair washed, complete linen change done. Electronically signed by Margaux Langford RN on 4/21/2024 at 3:07 PM    1730 spoke with Dr. Ron, plan for EGD tomorrow, NPO after 0400. Pt updated.     1845 spoke with Dr. Guerrero, updated on labs per his request. Electronically signed by Margaux Langford RN on 4/21/2024 at 6:45 PM

## 2024-04-22 ENCOUNTER — APPOINTMENT (OUTPATIENT)
Dept: GENERAL RADIOLOGY | Age: 64
DRG: 813 | End: 2024-04-22
Payer: MEDICARE

## 2024-04-22 PROBLEM — A41.9 SEPTIC SHOCK (HCC): Status: ACTIVE | Noted: 2024-03-06

## 2024-04-22 PROBLEM — R65.21 SEPTIC SHOCK (HCC): Status: ACTIVE | Noted: 2024-03-06

## 2024-04-22 LAB
ALBUMIN SERPL-MCNC: 4.5 G/DL (ref 3.5–4.6)
ALP SERPL-CCNC: 84 U/L (ref 40–130)
ALT SERPL-CCNC: <5 U/L (ref 0–33)
ANION GAP SERPL CALCULATED.3IONS-SCNC: 30 MEQ/L (ref 9–15)
AST SERPL-CCNC: 15 U/L (ref 0–35)
B-OH-BUTYR SERPL-SCNC: 30.6 MG/DL (ref 0.2–2.8)
BASE EXCESS ARTERIAL: -7 (ref -3–3)
BASE EXCESS ARTERIAL: -9 (ref -3–3)
BASOPHILS # BLD: 0 K/UL (ref 0–0.2)
BASOPHILS NFR BLD: 0.1 %
BILIRUB SERPL-MCNC: 1.2 MG/DL (ref 0.2–0.7)
BUN SERPL-MCNC: 77 MG/DL (ref 8–23)
CALCIUM IONIZED: 1.05 MMOL/L (ref 1.12–1.32)
CALCIUM IONIZED: 1.07 MMOL/L (ref 1.12–1.32)
CALCIUM SERPL-MCNC: 9.8 MG/DL (ref 8.5–9.9)
CHLORIDE SERPL-SCNC: 85 MEQ/L (ref 95–107)
CO2 SERPL-SCNC: 16 MEQ/L (ref 20–31)
CREAT SERPL-MCNC: 5.92 MG/DL (ref 0.5–0.9)
EOSINOPHIL # BLD: 0 K/UL (ref 0–0.7)
EOSINOPHIL NFR BLD: 0 %
ERYTHROCYTE [DISTWIDTH] IN BLOOD BY AUTOMATED COUNT: 20.5 % (ref 11.5–14.5)
GLOBULIN SER CALC-MCNC: 2.5 G/DL (ref 2.3–3.5)
GLUCOSE BLD-MCNC: 195 MG/DL (ref 70–99)
GLUCOSE BLD-MCNC: 238 MG/DL (ref 70–99)
GLUCOSE BLD-MCNC: 270 MG/DL (ref 70–99)
GLUCOSE BLD-MCNC: 320 MG/DL (ref 70–99)
GLUCOSE SERPL-MCNC: 302 MG/DL (ref 70–99)
HCO3 ARTERIAL: 15.6 MMOL/L (ref 21–29)
HCO3 ARTERIAL: 18.3 MMOL/L (ref 21–29)
HCT VFR BLD AUTO: 29.3 % (ref 37–47)
HCT VFR BLD AUTO: 29.6 % (ref 37–47)
HCT VFR BLD AUTO: 30 % (ref 36–48)
HCT VFR BLD AUTO: 31 % (ref 36–48)
HGB BLD CALC-MCNC: 10.3 GM/DL (ref 12–16)
HGB BLD CALC-MCNC: 10.5 GM/DL (ref 12–16)
HGB BLD-MCNC: 9.7 G/DL (ref 12–16)
HGB BLD-MCNC: 9.8 G/DL (ref 12–16)
INR PPP: 1.7
LACTATE: 1.7 MMOL/L (ref 0.4–2)
LACTATE: 2.18 MMOL/L (ref 0.4–2)
LYMPHOCYTES # BLD: 0.9 K/UL (ref 1–4.8)
LYMPHOCYTES NFR BLD: 5.4 %
MCH RBC QN AUTO: 33.8 PG (ref 27–31.3)
MCHC RBC AUTO-ENTMCNC: 32.8 % (ref 33–37)
MCV RBC AUTO: 103.1 FL (ref 79.4–94.8)
MONOCYTES # BLD: 0.9 K/UL (ref 0.2–0.8)
MONOCYTES NFR BLD: 5 %
NEUTROPHILS # BLD: 15 K/UL (ref 1.4–6.5)
NEUTS SEG NFR BLD: 87.9 %
O2 SAT, ARTERIAL: 100 % (ref 93–100)
O2 SAT, ARTERIAL: 96 % (ref 93–100)
PCO2 ARTERIAL: 26 MM HG (ref 35–45)
PCO2 ARTERIAL: 32 MM HG (ref 35–45)
PERFORMED ON: ABNORMAL
PH ARTERIAL: 7.37 (ref 7.35–7.45)
PH ARTERIAL: 7.39 (ref 7.35–7.45)
PLATELET # BLD AUTO: 198 K/UL (ref 130–400)
PO2 ARTERIAL: 272 MM HG (ref 75–108)
PO2 ARTERIAL: 83 MM HG (ref 75–108)
POC CHLORIDE: 94 MEQ/L (ref 99–110)
POC CHLORIDE: 96 MEQ/L (ref 99–110)
POC CREATININE: 5.9 MG/DL (ref 0.6–1.2)
POC CREATININE: 6 MG/DL (ref 0.6–1.2)
POC FIO2: 50
POC SAMPLE TYPE: ABNORMAL
POC SAMPLE TYPE: ABNORMAL
POTASSIUM SERPL-SCNC: 4.8 MEQ/L (ref 3.5–5.1)
POTASSIUM SERPL-SCNC: 5.1 MEQ/L (ref 3.5–5.1)
POTASSIUM SERPL-SCNC: 5.3 MEQ/L (ref 3.4–4.9)
PROCALCITONIN SERPL IA-MCNC: 0.85 NG/ML (ref 0–0.15)
PROT SERPL-MCNC: 7 G/DL (ref 6.3–8)
PROTHROMBIN TIME: 19.7 SEC (ref 12.3–14.9)
RBC # BLD AUTO: 2.87 M/UL (ref 4.2–5.4)
SODIUM BLD-SCNC: 124 MEQ/L (ref 136–145)
SODIUM BLD-SCNC: 125 MEQ/L (ref 136–145)
SODIUM SERPL-SCNC: 131 MEQ/L (ref 135–144)
TCO2 ARTERIAL: 16 MMOL/L (ref 21–32)
TCO2 ARTERIAL: 19 MMOL/L (ref 21–32)
VANCOMYCIN SERPL-MCNC: 21 UG/ML (ref 10–40)
WBC # BLD AUTO: 17.1 K/UL (ref 4.8–10.8)

## 2024-04-22 PROCEDURE — 03HY32Z INSERTION OF MONITORING DEVICE INTO UPPER ARTERY, PERCUTANEOUS APPROACH: ICD-10-PCS | Performed by: INTERNAL MEDICINE

## 2024-04-22 PROCEDURE — 90935 HEMODIALYSIS ONE EVALUATION: CPT

## 2024-04-22 PROCEDURE — 2709999900 HC NON-CHARGEABLE SUPPLY: Performed by: INTERNAL MEDICINE

## 2024-04-22 PROCEDURE — 84295 ASSAY OF SERUM SODIUM: CPT

## 2024-04-22 PROCEDURE — 99291 CRITICAL CARE FIRST HOUR: CPT | Performed by: INTERNAL MEDICINE

## 2024-04-22 PROCEDURE — 6370000000 HC RX 637 (ALT 250 FOR IP): Performed by: STUDENT IN AN ORGANIZED HEALTH CARE EDUCATION/TRAINING PROGRAM

## 2024-04-22 PROCEDURE — 2000000000 HC ICU R&B

## 2024-04-22 PROCEDURE — 2500000003 HC RX 250 WO HCPCS: Performed by: NURSE PRACTITIONER

## 2024-04-22 PROCEDURE — 6360000002 HC RX W HCPCS: Performed by: INTERNAL MEDICINE

## 2024-04-22 PROCEDURE — 31500 INSERT EMERGENCY AIRWAY: CPT

## 2024-04-22 PROCEDURE — 85025 COMPLETE CBC W/AUTO DIFF WBC: CPT

## 2024-04-22 PROCEDURE — 94761 N-INVAS EAR/PLS OXIMETRY MLT: CPT

## 2024-04-22 PROCEDURE — 2580000003 HC RX 258: Performed by: STUDENT IN AN ORGANIZED HEALTH CARE EDUCATION/TRAINING PROGRAM

## 2024-04-22 PROCEDURE — 2580000003 HC RX 258: Performed by: NURSE PRACTITIONER

## 2024-04-22 PROCEDURE — 2500000003 HC RX 250 WO HCPCS

## 2024-04-22 PROCEDURE — 37799 UNLISTED PX VASCULAR SURGERY: CPT

## 2024-04-22 PROCEDURE — 82330 ASSAY OF CALCIUM: CPT

## 2024-04-22 PROCEDURE — 85610 PROTHROMBIN TIME: CPT

## 2024-04-22 PROCEDURE — 36620 INSERTION CATHETER ARTERY: CPT

## 2024-04-22 PROCEDURE — 94640 AIRWAY INHALATION TREATMENT: CPT

## 2024-04-22 PROCEDURE — 6370000000 HC RX 637 (ALT 250 FOR IP): Performed by: NURSE PRACTITIONER

## 2024-04-22 PROCEDURE — 82010 KETONE BODYS QUAN: CPT

## 2024-04-22 PROCEDURE — 84132 ASSAY OF SERUM POTASSIUM: CPT

## 2024-04-22 PROCEDURE — 2500000003 HC RX 250 WO HCPCS: Performed by: INTERNAL MEDICINE

## 2024-04-22 PROCEDURE — 6360000002 HC RX W HCPCS: Performed by: NURSE PRACTITIONER

## 2024-04-22 PROCEDURE — A4217 STERILE WATER/SALINE, 500 ML: HCPCS | Performed by: INTERNAL MEDICINE

## 2024-04-22 PROCEDURE — 31500 INSERT EMERGENCY AIRWAY: CPT | Performed by: INTERNAL MEDICINE

## 2024-04-22 PROCEDURE — 6370000000 HC RX 637 (ALT 250 FOR IP): Performed by: INTERNAL MEDICINE

## 2024-04-22 PROCEDURE — 6360000002 HC RX W HCPCS

## 2024-04-22 PROCEDURE — 36592 COLLECT BLOOD FROM PICC: CPT

## 2024-04-22 PROCEDURE — 83605 ASSAY OF LACTIC ACID: CPT

## 2024-04-22 PROCEDURE — 82435 ASSAY OF BLOOD CHLORIDE: CPT

## 2024-04-22 PROCEDURE — 84145 PROCALCITONIN (PCT): CPT

## 2024-04-22 PROCEDURE — 82948 REAGENT STRIP/BLOOD GLUCOSE: CPT

## 2024-04-22 PROCEDURE — 94002 VENT MGMT INPAT INIT DAY: CPT

## 2024-04-22 PROCEDURE — 80202 ASSAY OF VANCOMYCIN: CPT

## 2024-04-22 PROCEDURE — 0DJ08ZZ INSPECTION OF UPPER INTESTINAL TRACT, VIA NATURAL OR ARTIFICIAL OPENING ENDOSCOPIC: ICD-10-PCS | Performed by: INTERNAL MEDICINE

## 2024-04-22 PROCEDURE — 80053 COMPREHEN METABOLIC PANEL: CPT

## 2024-04-22 PROCEDURE — 99232 SBSQ HOSP IP/OBS MODERATE 35: CPT | Performed by: NURSE PRACTITIONER

## 2024-04-22 PROCEDURE — 82803 BLOOD GASES ANY COMBINATION: CPT

## 2024-04-22 PROCEDURE — 36415 COLL VENOUS BLD VENIPUNCTURE: CPT

## 2024-04-22 PROCEDURE — 3609017100 HC EGD: Performed by: INTERNAL MEDICINE

## 2024-04-22 PROCEDURE — 36600 WITHDRAWAL OF ARTERIAL BLOOD: CPT

## 2024-04-22 PROCEDURE — 85014 HEMATOCRIT: CPT

## 2024-04-22 PROCEDURE — 85018 HEMOGLOBIN: CPT

## 2024-04-22 PROCEDURE — 2580000003 HC RX 258: Performed by: INTERNAL MEDICINE

## 2024-04-22 PROCEDURE — 6370000000 HC RX 637 (ALT 250 FOR IP)

## 2024-04-22 PROCEDURE — 82565 ASSAY OF CREATININE: CPT

## 2024-04-22 PROCEDURE — 5A1945Z RESPIRATORY VENTILATION, 24-96 CONSECUTIVE HOURS: ICD-10-PCS | Performed by: INTERNAL MEDICINE

## 2024-04-22 PROCEDURE — 0BH17EZ INSERTION OF ENDOTRACHEAL AIRWAY INTO TRACHEA, VIA NATURAL OR ARTIFICIAL OPENING: ICD-10-PCS | Performed by: INTERNAL MEDICINE

## 2024-04-22 PROCEDURE — 36620 INSERTION CATHETER ARTERY: CPT | Performed by: INTERNAL MEDICINE

## 2024-04-22 PROCEDURE — 43235 EGD DIAGNOSTIC BRUSH WASH: CPT | Performed by: INTERNAL MEDICINE

## 2024-04-22 PROCEDURE — C9113 INJ PANTOPRAZOLE SODIUM, VIA: HCPCS | Performed by: NURSE PRACTITIONER

## 2024-04-22 PROCEDURE — 71045 X-RAY EXAM CHEST 1 VIEW: CPT

## 2024-04-22 RX ORDER — NOREPINEPHRINE BITARTRATE 0.06 MG/ML
1-100 INJECTION, SOLUTION INTRAVENOUS CONTINUOUS
Status: DISCONTINUED | OUTPATIENT
Start: 2024-04-22 | End: 2024-04-29

## 2024-04-22 RX ORDER — ROCURONIUM BROMIDE 10 MG/ML
INJECTION, SOLUTION INTRAVENOUS
Status: COMPLETED
Start: 2024-04-22 | End: 2024-04-22

## 2024-04-22 RX ORDER — INSULIN LISPRO 100 [IU]/ML
0-16 INJECTION, SOLUTION INTRAVENOUS; SUBCUTANEOUS EVERY 4 HOURS
Status: DISCONTINUED | OUTPATIENT
Start: 2024-04-22 | End: 2024-04-25

## 2024-04-22 RX ORDER — SODIUM CHLORIDE 9 MG/ML
INJECTION, SOLUTION INTRAVENOUS PRN
Status: DISCONTINUED | OUTPATIENT
Start: 2024-04-22 | End: 2024-05-01 | Stop reason: HOSPADM

## 2024-04-22 RX ORDER — CHLORHEXIDINE GLUCONATE ORAL RINSE 1.2 MG/ML
15 SOLUTION DENTAL 2 TIMES DAILY
Status: DISCONTINUED | OUTPATIENT
Start: 2024-04-22 | End: 2024-05-01 | Stop reason: HOSPADM

## 2024-04-22 RX ORDER — SUCCINYLCHOLINE/SOD CL,ISO/PF 100 MG/5ML
SYRINGE (ML) INTRAVENOUS
Status: DISCONTINUED
Start: 2024-04-22 | End: 2024-04-22 | Stop reason: WASHOUT

## 2024-04-22 RX ORDER — PROPOFOL 10 MG/ML
5-50 INJECTION, EMULSION INTRAVENOUS CONTINUOUS
Status: DISCONTINUED | OUTPATIENT
Start: 2024-04-22 | End: 2024-04-23

## 2024-04-22 RX ORDER — SODIUM CHLORIDE 9 MG/ML
INJECTION, SOLUTION INTRAVENOUS CONTINUOUS
Status: DISCONTINUED | OUTPATIENT
Start: 2024-04-22 | End: 2024-04-25

## 2024-04-22 RX ORDER — SODIUM CHLORIDE 0.9 % (FLUSH) 0.9 %
5-40 SYRINGE (ML) INJECTION PRN
Status: DISCONTINUED | OUTPATIENT
Start: 2024-04-22 | End: 2024-05-01 | Stop reason: HOSPADM

## 2024-04-22 RX ORDER — MAGNESIUM HYDROXIDE 1200 MG/15ML
LIQUID ORAL CONTINUOUS PRN
Status: COMPLETED | OUTPATIENT
Start: 2024-04-22 | End: 2024-04-22

## 2024-04-22 RX ORDER — SODIUM CHLORIDE 0.9 % (FLUSH) 0.9 %
5-40 SYRINGE (ML) INJECTION EVERY 12 HOURS SCHEDULED
Status: DISCONTINUED | OUTPATIENT
Start: 2024-04-22 | End: 2024-05-01 | Stop reason: HOSPADM

## 2024-04-22 RX ORDER — SODIUM CHLORIDE 9 MG/ML
INJECTION, SOLUTION INTRAMUSCULAR; INTRAVENOUS; SUBCUTANEOUS
Status: DISPENSED
Start: 2024-04-22 | End: 2024-04-22

## 2024-04-22 RX ORDER — PHENYLEPHRINE HYDROCHLORIDE 10 MG/ML
INJECTION INTRAVENOUS
Status: DISPENSED
Start: 2024-04-22 | End: 2024-04-22

## 2024-04-22 RX ORDER — ETOMIDATE 2 MG/ML
INJECTION INTRAVENOUS
Status: COMPLETED
Start: 2024-04-22 | End: 2024-04-22

## 2024-04-22 RX ORDER — MIDAZOLAM HYDROCHLORIDE 1 MG/ML
4 INJECTION INTRAMUSCULAR; INTRAVENOUS ONCE
Status: COMPLETED | OUTPATIENT
Start: 2024-04-22 | End: 2024-04-22

## 2024-04-22 RX ORDER — SODIUM CHLORIDE 0.9 % (FLUSH) 0.9 %
5-40 SYRINGE (ML) INJECTION EVERY 12 HOURS SCHEDULED
Status: CANCELLED | OUTPATIENT
Start: 2024-04-22

## 2024-04-22 RX ORDER — FENTANYL CITRATE 50 UG/ML
INJECTION, SOLUTION INTRAMUSCULAR; INTRAVENOUS
Status: COMPLETED
Start: 2024-04-22 | End: 2024-04-22

## 2024-04-22 RX ORDER — SODIUM CHLORIDE 0.9 % (FLUSH) 0.9 %
5-40 SYRINGE (ML) INJECTION PRN
Status: CANCELLED | OUTPATIENT
Start: 2024-04-22

## 2024-04-22 RX ORDER — FENTANYL CITRATE-0.9 % NACL/PF 10 MCG/ML
25-200 PLASTIC BAG, INJECTION (ML) INTRAVENOUS CONTINUOUS
Status: DISCONTINUED | OUTPATIENT
Start: 2024-04-22 | End: 2024-04-23

## 2024-04-22 RX ORDER — FENTANYL CITRATE 50 UG/ML
50 INJECTION, SOLUTION INTRAMUSCULAR; INTRAVENOUS ONCE
Status: COMPLETED | OUTPATIENT
Start: 2024-04-22 | End: 2024-04-22

## 2024-04-22 RX ORDER — ROCURONIUM BROMIDE 10 MG/ML
60 INJECTION, SOLUTION INTRAVENOUS ONCE
Status: COMPLETED | OUTPATIENT
Start: 2024-04-22 | End: 2024-04-22

## 2024-04-22 RX ORDER — SODIUM CHLORIDE 9 MG/ML
INJECTION, SOLUTION INTRAVENOUS PRN
Status: CANCELLED | OUTPATIENT
Start: 2024-04-22

## 2024-04-22 RX ORDER — SODIUM CHLORIDE 9 MG/ML
INJECTION, SOLUTION INTRAVENOUS CONTINUOUS
Status: CANCELLED | OUTPATIENT
Start: 2024-04-22

## 2024-04-22 RX ORDER — ETOMIDATE 2 MG/ML
30 INJECTION INTRAVENOUS ONCE
Status: COMPLETED | OUTPATIENT
Start: 2024-04-22 | End: 2024-04-22

## 2024-04-22 RX ADMIN — PROPOFOL 20 MCG/KG/MIN: 10 INJECTION, EMULSION INTRAVENOUS at 10:31

## 2024-04-22 RX ADMIN — PROPOFOL 35 MCG/KG/MIN: 10 INJECTION, EMULSION INTRAVENOUS at 16:39

## 2024-04-22 RX ADMIN — METOCLOPRAMIDE HYDROCHLORIDE 10 MG: 5 INJECTION INTRAMUSCULAR; INTRAVENOUS at 16:42

## 2024-04-22 RX ADMIN — SODIUM CHLORIDE 1500 MG: 9 INJECTION, SOLUTION INTRAVENOUS at 19:25

## 2024-04-22 RX ADMIN — BUDESONIDE AND FORMOTEROL FUMARATE DIHYDRATE 2 PUFF: 160; 4.5 AEROSOL RESPIRATORY (INHALATION) at 04:02

## 2024-04-22 RX ADMIN — Medication 5 MCG/MIN: at 13:15

## 2024-04-22 RX ADMIN — CHLORHEXIDINE GLUCONATE, 0.12% ORAL RINSE 15 ML: 1.2 SOLUTION DENTAL at 21:12

## 2024-04-22 RX ADMIN — MIDODRINE HYDROCHLORIDE 20 MG: 5 TABLET ORAL at 05:56

## 2024-04-22 RX ADMIN — ROCURONIUM BROMIDE 60 MG: 10 INJECTION, SOLUTION INTRAVENOUS at 10:29

## 2024-04-22 RX ADMIN — Medication 6 MCG/MIN: at 03:07

## 2024-04-22 RX ADMIN — HYDROCORTISONE SODIUM SUCCINATE 50 MG: 100 INJECTION, POWDER, FOR SOLUTION INTRAMUSCULAR; INTRAVENOUS at 16:46

## 2024-04-22 RX ADMIN — HYDROCORTISONE SODIUM SUCCINATE 50 MG: 100 INJECTION, POWDER, FOR SOLUTION INTRAMUSCULAR; INTRAVENOUS at 02:57

## 2024-04-22 RX ADMIN — PANTOPRAZOLE SODIUM 8 MG/HR: 40 INJECTION, POWDER, FOR SOLUTION INTRAVENOUS at 11:02

## 2024-04-22 RX ADMIN — METOCLOPRAMIDE HYDROCHLORIDE 10 MG: 5 INJECTION INTRAMUSCULAR; INTRAVENOUS at 23:25

## 2024-04-22 RX ADMIN — HYDROCORTISONE SODIUM SUCCINATE 50 MG: 100 INJECTION, POWDER, FOR SOLUTION INTRAMUSCULAR; INTRAVENOUS at 21:12

## 2024-04-22 RX ADMIN — INSULIN LISPRO 4 UNITS: 100 INJECTION, SOLUTION INTRAVENOUS; SUBCUTANEOUS at 21:12

## 2024-04-22 RX ADMIN — PROPOFOL 35 MCG/KG/MIN: 10 INJECTION, EMULSION INTRAVENOUS at 22:28

## 2024-04-22 RX ADMIN — Medication 100 MCG/HR: at 22:44

## 2024-04-22 RX ADMIN — PANTOPRAZOLE SODIUM 8 MG/HR: 40 INJECTION, POWDER, FOR SOLUTION INTRAVENOUS at 22:11

## 2024-04-22 RX ADMIN — PANTOPRAZOLE SODIUM 8 MG/HR: 40 INJECTION, POWDER, FOR SOLUTION INTRAVENOUS at 02:56

## 2024-04-22 RX ADMIN — Medication 50 MCG/HR: at 10:31

## 2024-04-22 RX ADMIN — METOCLOPRAMIDE HYDROCHLORIDE 10 MG: 5 INJECTION INTRAMUSCULAR; INTRAVENOUS at 00:11

## 2024-04-22 RX ADMIN — TIOTROPIUM BROMIDE INHALATION SPRAY 2 PUFF: 3.12 SPRAY, METERED RESPIRATORY (INHALATION) at 04:02

## 2024-04-22 RX ADMIN — MEROPENEM 1000 MG: 1 INJECTION, POWDER, FOR SOLUTION INTRAVENOUS at 16:49

## 2024-04-22 RX ADMIN — FENTANYL CITRATE 50 MCG: 50 INJECTION, SOLUTION INTRAMUSCULAR; INTRAVENOUS at 10:18

## 2024-04-22 RX ADMIN — ETOMIDATE 30 MG: 2 INJECTION INTRAVENOUS at 10:19

## 2024-04-22 RX ADMIN — FENTANYL CITRATE 50 MCG: 50 INJECTION INTRAMUSCULAR; INTRAVENOUS at 10:18

## 2024-04-22 RX ADMIN — INSULIN LISPRO 6 UNITS: 100 INJECTION, SOLUTION INTRAVENOUS; SUBCUTANEOUS at 08:14

## 2024-04-22 RX ADMIN — MUPIROCIN: 20 OINTMENT TOPICAL at 23:25

## 2024-04-22 RX ADMIN — MIDAZOLAM 4 MG: 1 INJECTION INTRAMUSCULAR; INTRAVENOUS at 10:18

## 2024-04-22 RX ADMIN — Medication 10 ML: at 21:18

## 2024-04-22 RX ADMIN — METOCLOPRAMIDE HYDROCHLORIDE 10 MG: 5 INJECTION INTRAMUSCULAR; INTRAVENOUS at 05:56

## 2024-04-22 RX ADMIN — ETOMIDATE 30 MG: 2 INJECTION, SOLUTION INTRAVENOUS at 10:19

## 2024-04-22 ASSESSMENT — PULMONARY FUNCTION TESTS
PIF_VALUE: 20
PIF_VALUE: 15
PIF_VALUE: 15
PIF_VALUE: 14
PIF_VALUE: 18
PIF_VALUE: 21
PIF_VALUE: 19
PIF_VALUE: 22
PIF_VALUE: 21
PIF_VALUE: 24
PIF_VALUE: 25
PIF_VALUE: 22
PIF_VALUE: 9
PIF_VALUE: 24
PIF_VALUE: 23
PIF_VALUE: 22
PIF_VALUE: 28
PIF_VALUE: 17
PIF_VALUE: 12
PIF_VALUE: 9
PIF_VALUE: 22
PIF_VALUE: 20
PIF_VALUE: 24
PIF_VALUE: 13
PIF_VALUE: 24
PIF_VALUE: 24
PIF_VALUE: 17
PIF_VALUE: 22
PIF_VALUE: 22
PIF_VALUE: 10
PIF_VALUE: 24
PIF_VALUE: 19
PIF_VALUE: 25
PIF_VALUE: 22
PIF_VALUE: 17
PIF_VALUE: 25
PIF_VALUE: 25
PIF_VALUE: 23
PIF_VALUE: 16
PIF_VALUE: 18
PIF_VALUE: 23
PIF_VALUE: 21
PIF_VALUE: 24
PIF_VALUE: 20
PIF_VALUE: 25
PIF_VALUE: 23
PIF_VALUE: 24
PIF_VALUE: 29

## 2024-04-22 ASSESSMENT — PAIN SCALES - GENERAL
PAINLEVEL_OUTOF10: 0

## 2024-04-22 NOTE — PROCEDURES
PROCEDURE:   Endotracheal intubation.       INDICATIONS:   Acute Respiratory Failure.    Consent   Unable to be obtained due to the emergent nature of this procedure.    PROCEDURE SUMMARY:   Permit was implied secondary to emergent situation. Amac 4 blade  was inserted into the oropharynx at which time the vocal cords were visualized. 7.5 [place]e endotracheal tube was inserted and visualized going through the vocal cords. The stylette was removed. Colorimetric change was visualized on the CO2 meter. Breath sounds were heard in both lung fields equally. The endotracheal tube was placed at 23 cm, measured at the teeth.    COMPLICATIONS:   none    ESTIMATED BLOOD LOSS:   None       Say Marsh MD 10:31 AM 4/22/2024 .    PROCEDURE:   Radial artery line placement. (A-line)  34294    INDICATION:   shock    CONSENT: Unable to be obtained due to the emergent nature of this procedure.nt.      PROCEDURE SUMMARY:   Radial arteries were assessed by palpation and ultrasound localization.  Armand test was done to asses collateral circulation.  The patient was prepped and draped in the usual sterile manner using chlorhexidine scrub. 1% lidocaine was used to numb the region. The right  radial artery was palpated and successfully cannulated on the first pass. Pulsatile, arterial blood was visualized and the artery was then threaded using the Seldinger technique and a catheter was then sutured into place. Good wave-form was obtained. The patient tolerated the procedure well without any immediate complications. The area was cleaned and Tegaderm was applied.     ESTIMATED BLOOD LOSS:   Minimal    COMPLICATIONS:  No immediate complication     Say Marsh MD

## 2024-04-22 NOTE — INTERDISCIPLINARY ROUNDS
Spiritual Care Services     Summary of Visit:  Attended interdisciplinary rounds. Prayed with pt and offered reassurance after rounds, prior to her intubation. Pt not in any pain, but clearly compromised. Pt is Jewish, she asks for daily prayer. Pt has an AD naming her dtr Angelina as her POA.     Encounter Summary  Encounter Overview/Reason : Interdisciplinary rounds, Grief, Loss, and Adjustments  Service Provided For:: Patient  Referral/Consult From:: Rounding  Support System: Children, Family members  Last Encounter : 04/21/24  Complexity of Encounter: Moderate  Begin Time: 1000  End Time : 1020  Total Time Calculated: 20 min        Spiritual/Emotional needs  Type: Spiritual Support     Grief, Loss, and Adjustments  Type: Adjustment to illness                Spiritual Assessment/Intervention/Outcomes:    Assessment: Compromised coping    Intervention: Prayer (assurance of)/[place], Sustaining Presence/Ministry of presence    Outcome: Receptive      Care Plan:    Plan and Referrals  Plan/Referrals: Continue Support (comment)    Spiritual Care Services   Electronically signed by LUKAS Rosenthal on 4/22/2024 at 10:25 AM.    To reach a  for emotional and spiritual support, place an EPIC consult request.   If a  is needed immediately, dial “0” and ask to page the on-call .

## 2024-04-22 NOTE — FLOWSHEET NOTE
04/22/24 1632   Observations & Evaluations   FiO2  35 %   Vital Signs   BP (!) 124/53   Pulse 80   Respirations 15   SpO2 100 %   Hemodialysis Fistula/Graft Arteriovenous fistula Superior Arm   No placement date or time found.   Present on Admission/Arrival: Yes  Access Type: Arteriovenous fistula  Orientation: Superior  Access Location: Arm   Site Assessment Clean, dry & intact   Thrill Present   Bruit Present   Status Accessed   Venous Needle Size 15 G   Arterial Needle Size 15 G   Post-Hemodialysis Assessment   Post-Treatment Procedures Blood returned   Machine Disinfection Process Acid/Vinegar Clean;Heat Disinfect;Exterior Machine Disinfection   Blood Volume Processed (Liters) 78 L   Dialyzer Clearance Lightly streaked   Duration of Treatment (minutes) 200 minutes   Hemodialysis Intake (ml) 400 ml   Hemodialysis Output (ml) 2400 ml   NET Removed (ml) 2000   Tolerated Treatment Good   Dialysis Bath   K+ (Potassium) 2   Ca+ (Calcium) 2.5   Na+ (Sodium) 137   HCO3 (Bicarb) 37

## 2024-04-23 LAB
ALBUMIN SERPL-MCNC: 4.3 G/DL (ref 3.5–4.6)
ALP SERPL-CCNC: 83 U/L (ref 40–130)
ALT SERPL-CCNC: <5 U/L (ref 0–33)
ANION GAP SERPL CALCULATED.3IONS-SCNC: 27 MEQ/L (ref 9–15)
AST SERPL-CCNC: 14 U/L (ref 0–35)
BASOPHILS # BLD: 0 K/UL (ref 0–0.2)
BASOPHILS NFR BLD: 0.1 %
BILIRUB SERPL-MCNC: 1.1 MG/DL (ref 0.2–0.7)
BUN SERPL-MCNC: 51 MG/DL (ref 8–23)
CALCIUM SERPL-MCNC: 9.5 MG/DL (ref 8.5–9.9)
CHLORIDE SERPL-SCNC: 88 MEQ/L (ref 95–107)
CO2 SERPL-SCNC: 18 MEQ/L (ref 20–31)
CREAT SERPL-MCNC: 4.29 MG/DL (ref 0.5–0.9)
EOSINOPHIL # BLD: 0 K/UL (ref 0–0.7)
EOSINOPHIL NFR BLD: 0.1 %
ERYTHROCYTE [DISTWIDTH] IN BLOOD BY AUTOMATED COUNT: 21 % (ref 11.5–14.5)
GLOBULIN SER CALC-MCNC: 2.4 G/DL (ref 2.3–3.5)
GLUCOSE BLD-MCNC: 207 MG/DL (ref 70–99)
GLUCOSE BLD-MCNC: 253 MG/DL (ref 70–99)
GLUCOSE BLD-MCNC: 258 MG/DL (ref 70–99)
GLUCOSE BLD-MCNC: 265 MG/DL (ref 70–99)
GLUCOSE BLD-MCNC: 266 MG/DL (ref 70–99)
GLUCOSE BLD-MCNC: 277 MG/DL (ref 70–99)
GLUCOSE SERPL-MCNC: 266 MG/DL (ref 70–99)
HCT VFR BLD AUTO: 28.2 % (ref 37–47)
HCT VFR BLD AUTO: 29.1 % (ref 37–47)
HGB BLD-MCNC: 9.3 G/DL (ref 12–16)
HGB BLD-MCNC: 9.6 G/DL (ref 12–16)
INR PPP: 1.5
LYMPHOCYTES # BLD: 0.7 K/UL (ref 1–4.8)
LYMPHOCYTES NFR BLD: 3.5 %
MCH RBC QN AUTO: 34.2 PG (ref 27–31.3)
MCHC RBC AUTO-ENTMCNC: 33 % (ref 33–37)
MCV RBC AUTO: 103.6 FL (ref 79.4–94.8)
MONOCYTES # BLD: 0.8 K/UL (ref 0.2–0.8)
MONOCYTES NFR BLD: 4 %
NEUTROPHILS # BLD: 17.4 K/UL (ref 1.4–6.5)
NEUTS SEG NFR BLD: 91.4 %
PERFORMED ON: ABNORMAL
PLATELET # BLD AUTO: 152 K/UL (ref 130–400)
POTASSIUM SERPL-SCNC: 4.1 MEQ/L (ref 3.4–4.9)
PROT SERPL-MCNC: 6.7 G/DL (ref 6.3–8)
PROTHROMBIN TIME: 18.3 SEC (ref 12.3–14.9)
RBC # BLD AUTO: 2.81 M/UL (ref 4.2–5.4)
SODIUM SERPL-SCNC: 133 MEQ/L (ref 135–144)
VANCOMYCIN SERPL-MCNC: 23.3 UG/ML (ref 10–40)
WBC # BLD AUTO: 19.1 K/UL (ref 4.8–10.8)

## 2024-04-23 PROCEDURE — 2500000003 HC RX 250 WO HCPCS: Performed by: INTERNAL MEDICINE

## 2024-04-23 PROCEDURE — 80202 ASSAY OF VANCOMYCIN: CPT

## 2024-04-23 PROCEDURE — C9113 INJ PANTOPRAZOLE SODIUM, VIA: HCPCS | Performed by: INTERNAL MEDICINE

## 2024-04-23 PROCEDURE — 6360000002 HC RX W HCPCS: Performed by: NURSE PRACTITIONER

## 2024-04-23 PROCEDURE — 2000000000 HC ICU R&B

## 2024-04-23 PROCEDURE — 6370000000 HC RX 637 (ALT 250 FOR IP): Performed by: INTERNAL MEDICINE

## 2024-04-23 PROCEDURE — 99232 SBSQ HOSP IP/OBS MODERATE 35: CPT | Performed by: NURSE PRACTITIONER

## 2024-04-23 PROCEDURE — 37799 UNLISTED PX VASCULAR SURGERY: CPT

## 2024-04-23 PROCEDURE — 2580000003 HC RX 258: Performed by: INTERNAL MEDICINE

## 2024-04-23 PROCEDURE — 2500000003 HC RX 250 WO HCPCS: Performed by: NURSE PRACTITIONER

## 2024-04-23 PROCEDURE — 85014 HEMATOCRIT: CPT

## 2024-04-23 PROCEDURE — 99291 CRITICAL CARE FIRST HOUR: CPT | Performed by: INTERNAL MEDICINE

## 2024-04-23 PROCEDURE — 85025 COMPLETE CBC W/AUTO DIFF WBC: CPT

## 2024-04-23 PROCEDURE — 2580000003 HC RX 258: Performed by: STUDENT IN AN ORGANIZED HEALTH CARE EDUCATION/TRAINING PROGRAM

## 2024-04-23 PROCEDURE — 94003 VENT MGMT INPAT SUBQ DAY: CPT

## 2024-04-23 PROCEDURE — 6370000000 HC RX 637 (ALT 250 FOR IP): Performed by: STUDENT IN AN ORGANIZED HEALTH CARE EDUCATION/TRAINING PROGRAM

## 2024-04-23 PROCEDURE — 6360000002 HC RX W HCPCS: Performed by: INTERNAL MEDICINE

## 2024-04-23 PROCEDURE — 80053 COMPREHEN METABOLIC PANEL: CPT

## 2024-04-23 PROCEDURE — 2700000000 HC OXYGEN THERAPY PER DAY

## 2024-04-23 PROCEDURE — 99231 SBSQ HOSP IP/OBS SF/LOW 25: CPT | Performed by: INTERNAL MEDICINE

## 2024-04-23 PROCEDURE — 85610 PROTHROMBIN TIME: CPT

## 2024-04-23 PROCEDURE — 89220 SPUTUM SPECIMEN COLLECTION: CPT

## 2024-04-23 PROCEDURE — 85018 HEMOGLOBIN: CPT

## 2024-04-23 PROCEDURE — 6370000000 HC RX 637 (ALT 250 FOR IP): Performed by: NURSE PRACTITIONER

## 2024-04-23 PROCEDURE — A4216 STERILE WATER/SALINE, 10 ML: HCPCS | Performed by: INTERNAL MEDICINE

## 2024-04-23 RX ADMIN — Medication 125 MCG/HR: at 07:10

## 2024-04-23 RX ADMIN — METOCLOPRAMIDE HYDROCHLORIDE 10 MG: 5 INJECTION INTRAMUSCULAR; INTRAVENOUS at 17:09

## 2024-04-23 RX ADMIN — INSULIN LISPRO 4 UNITS: 100 INJECTION, SOLUTION INTRAVENOUS; SUBCUTANEOUS at 21:48

## 2024-04-23 RX ADMIN — INSULIN LISPRO 8 UNITS: 100 INJECTION, SOLUTION INTRAVENOUS; SUBCUTANEOUS at 01:43

## 2024-04-23 RX ADMIN — Medication 13 MCG/MIN: at 02:59

## 2024-04-23 RX ADMIN — HYDROXYZINE HYDROCHLORIDE 25 MG: 25 TABLET ORAL at 07:49

## 2024-04-23 RX ADMIN — PROPOFOL 40 MCG/KG/MIN: 10 INJECTION, EMULSION INTRAVENOUS at 03:03

## 2024-04-23 RX ADMIN — METOCLOPRAMIDE HYDROCHLORIDE 10 MG: 5 INJECTION INTRAMUSCULAR; INTRAVENOUS at 05:26

## 2024-04-23 RX ADMIN — POLYETHYLENE GLYCOL 3350 17 G: 17 POWDER, FOR SOLUTION ORAL at 07:49

## 2024-04-23 RX ADMIN — HYDROCORTISONE SODIUM SUCCINATE 50 MG: 100 INJECTION, POWDER, FOR SOLUTION INTRAMUSCULAR; INTRAVENOUS at 01:44

## 2024-04-23 RX ADMIN — MUPIROCIN: 20 OINTMENT TOPICAL at 21:52

## 2024-04-23 RX ADMIN — MIDODRINE HYDROCHLORIDE 20 MG: 5 TABLET ORAL at 17:09

## 2024-04-23 RX ADMIN — METOCLOPRAMIDE HYDROCHLORIDE 10 MG: 5 INJECTION INTRAMUSCULAR; INTRAVENOUS at 12:22

## 2024-04-23 RX ADMIN — DOCUSATE SODIUM 100 MG: 50 LIQUID ORAL at 15:27

## 2024-04-23 RX ADMIN — SODIUM CHLORIDE, PRESERVATIVE FREE 40 MG: 5 INJECTION INTRAVENOUS at 21:49

## 2024-04-23 RX ADMIN — SODIUM CHLORIDE, PRESERVATIVE FREE 10 ML: 5 INJECTION INTRAVENOUS at 07:50

## 2024-04-23 RX ADMIN — MIDODRINE HYDROCHLORIDE 20 MG: 5 TABLET ORAL at 12:22

## 2024-04-23 RX ADMIN — Medication 10 ML: at 21:48

## 2024-04-23 RX ADMIN — ATORVASTATIN CALCIUM 40 MG: 40 TABLET, FILM COATED ORAL at 21:49

## 2024-04-23 RX ADMIN — HYDROCORTISONE SODIUM SUCCINATE 50 MG: 100 INJECTION, POWDER, FOR SOLUTION INTRAMUSCULAR; INTRAVENOUS at 07:50

## 2024-04-23 RX ADMIN — SODIUM CHLORIDE, PRESERVATIVE FREE 40 MG: 5 INJECTION INTRAVENOUS at 12:23

## 2024-04-23 RX ADMIN — INSULIN LISPRO 4 UNITS: 100 INJECTION, SOLUTION INTRAVENOUS; SUBCUTANEOUS at 10:26

## 2024-04-23 RX ADMIN — PROPOFOL 45 MCG/KG/MIN: 10 INJECTION, EMULSION INTRAVENOUS at 07:14

## 2024-04-23 RX ADMIN — INSULIN LISPRO 8 UNITS: 100 INJECTION, SOLUTION INTRAVENOUS; SUBCUTANEOUS at 05:26

## 2024-04-23 RX ADMIN — CHLORHEXIDINE GLUCONATE, 0.12% ORAL RINSE 15 ML: 1.2 SOLUTION DENTAL at 21:49

## 2024-04-23 RX ADMIN — HYDROXYZINE HYDROCHLORIDE 25 MG: 25 TABLET ORAL at 21:50

## 2024-04-23 RX ADMIN — ARIPIPRAZOLE 5 MG: 5 TABLET ORAL at 07:49

## 2024-04-23 RX ADMIN — POLYETHYLENE GLYCOL 3350 17 G: 17 POWDER, FOR SOLUTION ORAL at 21:49

## 2024-04-23 RX ADMIN — HYDROCORTISONE SODIUM SUCCINATE 50 MG: 100 INJECTION, POWDER, FOR SOLUTION INTRAMUSCULAR; INTRAVENOUS at 21:49

## 2024-04-23 RX ADMIN — MIDODRINE HYDROCHLORIDE 20 MG: 5 TABLET ORAL at 06:36

## 2024-04-23 RX ADMIN — HYDROCORTISONE SODIUM SUCCINATE 50 MG: 100 INJECTION, POWDER, FOR SOLUTION INTRAMUSCULAR; INTRAVENOUS at 15:27

## 2024-04-23 RX ADMIN — CHLORHEXIDINE GLUCONATE, 0.12% ORAL RINSE 15 ML: 1.2 SOLUTION DENTAL at 07:49

## 2024-04-23 RX ADMIN — INSULIN LISPRO 8 UNITS: 100 INJECTION, SOLUTION INTRAVENOUS; SUBCUTANEOUS at 17:37

## 2024-04-23 RX ADMIN — INSULIN LISPRO 8 UNITS: 100 INJECTION, SOLUTION INTRAVENOUS; SUBCUTANEOUS at 15:27

## 2024-04-23 RX ADMIN — MUPIROCIN: 20 OINTMENT TOPICAL at 15:18

## 2024-04-23 RX ADMIN — SERTRALINE HYDROCHLORIDE 25 MG: 25 TABLET ORAL at 21:50

## 2024-04-23 RX ADMIN — SODIUM CHLORIDE, PRESERVATIVE FREE 10 ML: 5 INJECTION INTRAVENOUS at 21:47

## 2024-04-23 RX ADMIN — DOCUSATE SODIUM 100 MG: 50 LIQUID ORAL at 21:49

## 2024-04-23 RX ADMIN — MEROPENEM 500 MG: 500 INJECTION, POWDER, FOR SOLUTION INTRAVENOUS at 12:33

## 2024-04-23 ASSESSMENT — PULMONARY FUNCTION TESTS
PIF_VALUE: 23
PIF_VALUE: 24
PIF_VALUE: 24
PIF_VALUE: 23
PIF_VALUE: 24
PIF_VALUE: 24
PIF_VALUE: 25
PIF_VALUE: 25
PIF_VALUE: 23
PIF_VALUE: 24
PIF_VALUE: 24
PIF_VALUE: 25
PIF_VALUE: 24
PIF_VALUE: 25
PIF_VALUE: 23
PIF_VALUE: 23
PIF_VALUE: 25
PIF_VALUE: 24
PIF_VALUE: 21
PIF_VALUE: 26
PIF_VALUE: 25
PIF_VALUE: 24
PIF_VALUE: 24
PIF_VALUE: 26
PIF_VALUE: 25
PIF_VALUE: 23
PIF_VALUE: 25
PIF_VALUE: 24
PIF_VALUE: 25
PIF_VALUE: 24
PIF_VALUE: 25
PIF_VALUE: 23
PIF_VALUE: 26
PIF_VALUE: 24
PIF_VALUE: 25
PIF_VALUE: 24
PIF_VALUE: 24
PIF_VALUE: 23
PIF_VALUE: 23
PIF_VALUE: 24
PIF_VALUE: 26
PIF_VALUE: 24
PIF_VALUE: 24
PIF_VALUE: 25
PIF_VALUE: 25
PIF_VALUE: 24
PIF_VALUE: 24
PIF_VALUE: 23
PIF_VALUE: 22
PIF_VALUE: 25
PIF_VALUE: 23
PIF_VALUE: 23
PIF_VALUE: 24
PIF_VALUE: 25
PIF_VALUE: 24
PIF_VALUE: 24
PIF_VALUE: 26
PIF_VALUE: 25
PIF_VALUE: 24
PIF_VALUE: 23
PIF_VALUE: 24
PIF_VALUE: 25
PIF_VALUE: 24
PIF_VALUE: 25
PIF_VALUE: 24
PIF_VALUE: 24
PIF_VALUE: 27
PIF_VALUE: 24
PIF_VALUE: 23
PIF_VALUE: 24
PIF_VALUE: 25
PIF_VALUE: 25
PIF_VALUE: 23
PIF_VALUE: 24
PIF_VALUE: 23
PIF_VALUE: 24
PIF_VALUE: 29
PIF_VALUE: 25
PIF_VALUE: 24
PIF_VALUE: 25

## 2024-04-23 ASSESSMENT — PAIN SCALES - GENERAL
PAINLEVEL_OUTOF10: 0

## 2024-04-23 NOTE — FLOWSHEET NOTE
1230 spoke with Kayleigh HUDDLESTON, will plan on paracentesis on 4/24 when pt may be extubated. Kiya Ramirez CNP made aware.

## 2024-04-24 ENCOUNTER — APPOINTMENT (OUTPATIENT)
Dept: INTERVENTIONAL RADIOLOGY/VASCULAR | Age: 64
DRG: 813 | End: 2024-04-24
Payer: MEDICARE

## 2024-04-24 PROBLEM — J96.00 ACUTE RESPIRATORY FAILURE (HCC): Status: ACTIVE | Noted: 2024-04-24

## 2024-04-24 PROBLEM — R18.8 CIRRHOSIS OF LIVER WITH ASCITES (HCC): Status: ACTIVE | Noted: 2024-04-24

## 2024-04-24 PROBLEM — K74.60 CIRRHOSIS OF LIVER WITH ASCITES (HCC): Status: ACTIVE | Noted: 2024-04-24

## 2024-04-24 PROBLEM — D68.9 COAGULOPATHY (HCC): Status: ACTIVE | Noted: 2024-04-24

## 2024-04-24 PROBLEM — R18.8 OTHER ASCITES: Status: ACTIVE | Noted: 2024-04-24

## 2024-04-24 LAB
ALBUMIN SERPL-MCNC: 4.2 G/DL (ref 3.5–4.6)
ALP SERPL-CCNC: 85 U/L (ref 40–130)
ALT SERPL-CCNC: <5 U/L (ref 0–33)
ANION GAP SERPL CALCULATED.3IONS-SCNC: 24 MEQ/L (ref 9–15)
ANISOCYTOSIS BLD QL SMEAR: ABNORMAL
APPEARANCE FLUID: NORMAL
AST SERPL-CCNC: 13 U/L (ref 0–35)
BASE EXCESS ARTERIAL: -3 (ref -3–3)
BASOPHILS # BLD: 0 K/UL (ref 0–0.2)
BASOPHILS NFR BLD: 0 %
BILIRUB SERPL-MCNC: 1.2 MG/DL (ref 0.2–0.7)
BUN SERPL-MCNC: 65 MG/DL (ref 8–23)
BURR CELLS BLD QL SMEAR: ABNORMAL
BURR CELLS: ABNORMAL
CALCIUM IONIZED: 1.17 MMOL/L (ref 1.12–1.32)
CALCIUM SERPL-MCNC: 9.8 MG/DL (ref 8.5–9.9)
CELL COUNT FLUID TYPE: NORMAL
CHLORIDE SERPL-SCNC: 91 MEQ/L (ref 95–107)
CLOT EVALUATION: NORMAL
CO2 SERPL-SCNC: 19 MEQ/L (ref 20–31)
COLOR FLUID: YELLOW
CREAT SERPL-MCNC: 5.08 MG/DL (ref 0.5–0.9)
EOSINOPHIL # BLD: 0 K/UL (ref 0–0.7)
EOSINOPHIL NFR BLD: 0 %
ERYTHROCYTE [DISTWIDTH] IN BLOOD BY AUTOMATED COUNT: 20.6 % (ref 11.5–14.5)
FLUID PATH CONSULT: YES
FLUID TYPE: NORMAL
GLOBULIN SER CALC-MCNC: 2.5 G/DL (ref 2.3–3.5)
GLUCOSE BLD-MCNC: 221 MG/DL (ref 70–99)
GLUCOSE BLD-MCNC: 221 MG/DL (ref 70–99)
GLUCOSE BLD-MCNC: 236 MG/DL (ref 70–99)
GLUCOSE BLD-MCNC: 241 MG/DL (ref 70–99)
GLUCOSE BLD-MCNC: 248 MG/DL (ref 70–99)
GLUCOSE BLD-MCNC: 258 MG/DL (ref 70–99)
GLUCOSE BLD-MCNC: 259 MG/DL (ref 70–99)
GLUCOSE FLD-MCNC: 267 MG/DL
GLUCOSE SERPL-MCNC: 270 MG/DL (ref 70–99)
HCO3 ARTERIAL: 22.3 MMOL/L (ref 21–29)
HCT VFR BLD AUTO: 30.3 % (ref 37–47)
HCT VFR BLD AUTO: 30.7 % (ref 37–47)
HCT VFR BLD AUTO: 32 % (ref 36–48)
HGB BLD CALC-MCNC: 10.9 GM/DL (ref 12–16)
HGB BLD-MCNC: 10.3 G/DL (ref 12–16)
HGB BLD-MCNC: 9.9 G/DL (ref 12–16)
INR PPP: 1.4
LACTATE: 1.09 MMOL/L (ref 0.4–2)
LYMPHOCYTES # BLD: 0 K/UL (ref 1–4.8)
LYMPHOCYTES NFR BLD: 2.2 %
LYMPHOCYTES, BODY FLUID: 20 %
MACROCYTES BLD QL SMEAR: ABNORMAL
MCH RBC QN AUTO: 34.7 PG (ref 27–31.3)
MCHC RBC AUTO-ENTMCNC: 33.6 % (ref 33–37)
MCV RBC AUTO: 103.4 FL (ref 79.4–94.8)
MONOCYTE, FLUID: 38 %
MONOCYTES # BLD: 0.9 K/UL (ref 0.2–0.8)
MONOCYTES NFR BLD: 3.8 %
NEUTROPHIL, FLUID: 42 %
NEUTROPHILS # BLD: 20.9 K/UL (ref 1.4–6.5)
NEUTS BAND NFR BLD MANUAL: 1 % (ref 5–11)
NEUTS SEG NFR BLD: 95 %
NEUTS VAC BLD QL SMEAR: ABNORMAL
NUCLEATED CELLS FLUID: 327 /CUMM
NUMBER OF CELLS COUNTED FLUID: 100
O2 SAT, ARTERIAL: 100 % (ref 93–100)
OVALOCYTES BLD QL SMEAR: ABNORMAL
PCO2 ARTERIAL: 38 MM HG (ref 35–45)
PERFORMED ON: ABNORMAL
PH ARTERIAL: 7.38 (ref 7.35–7.45)
PLATELET # BLD AUTO: 129 K/UL (ref 130–400)
PLATELET BLD QL SMEAR: ABNORMAL
PO2 ARTERIAL: 164 MM HG (ref 75–108)
POC CHLORIDE: 101 MEQ/L (ref 99–110)
POC CREATININE: 5.4 MG/DL (ref 0.6–1.2)
POC FIO2: 35
POC SAMPLE TYPE: ABNORMAL
POIKILOCYTOSIS BLD QL SMEAR: ABNORMAL
POLYCHROMASIA BLD QL SMEAR: ABNORMAL
POTASSIUM SERPL-SCNC: 4 MEQ/L (ref 3.5–5.1)
POTASSIUM SERPL-SCNC: 4.4 MEQ/L (ref 3.4–4.9)
PROT FLD-MCNC: 3.5 G/DL
PROT SERPL-MCNC: 6.7 G/DL (ref 6.3–8)
PROTHROMBIN TIME: 17.1 SEC (ref 12.3–14.9)
RBC # BLD AUTO: 2.97 M/UL (ref 4.2–5.4)
RBC FLUID: 9000 /CUMM
SLIDE REVIEW: ABNORMAL
SMUDGE CELLS BLD QL SMEAR: 3.8
SODIUM BLD-SCNC: 131 MEQ/L (ref 136–145)
SODIUM SERPL-SCNC: 134 MEQ/L (ref 135–144)
TCO2 ARTERIAL: 23 MMOL/L (ref 21–32)
VANCOMYCIN SERPL-MCNC: 20.4 UG/ML (ref 10–40)
WBC # BLD AUTO: 21.8 K/UL (ref 4.8–10.8)

## 2024-04-24 PROCEDURE — A4216 STERILE WATER/SALINE, 10 ML: HCPCS | Performed by: INTERNAL MEDICINE

## 2024-04-24 PROCEDURE — 6360000002 HC RX W HCPCS: Performed by: INTERNAL MEDICINE

## 2024-04-24 PROCEDURE — 2580000003 HC RX 258: Performed by: NURSE PRACTITIONER

## 2024-04-24 PROCEDURE — 82042 OTHER SOURCE ALBUMIN QUAN EA: CPT

## 2024-04-24 PROCEDURE — 82803 BLOOD GASES ANY COMBINATION: CPT

## 2024-04-24 PROCEDURE — 82435 ASSAY OF BLOOD CHLORIDE: CPT

## 2024-04-24 PROCEDURE — 6360000002 HC RX W HCPCS: Performed by: NURSE PRACTITIONER

## 2024-04-24 PROCEDURE — 37799 UNLISTED PX VASCULAR SURGERY: CPT

## 2024-04-24 PROCEDURE — 0W9G3ZZ DRAINAGE OF PERITONEAL CAVITY, PERCUTANEOUS APPROACH: ICD-10-PCS | Performed by: STUDENT IN AN ORGANIZED HEALTH CARE EDUCATION/TRAINING PROGRAM

## 2024-04-24 PROCEDURE — 82330 ASSAY OF CALCIUM: CPT

## 2024-04-24 PROCEDURE — 99291 CRITICAL CARE FIRST HOUR: CPT | Performed by: INTERNAL MEDICINE

## 2024-04-24 PROCEDURE — 83615 LACTATE (LD) (LDH) ENZYME: CPT

## 2024-04-24 PROCEDURE — C9113 INJ PANTOPRAZOLE SODIUM, VIA: HCPCS | Performed by: INTERNAL MEDICINE

## 2024-04-24 PROCEDURE — 82565 ASSAY OF CREATININE: CPT

## 2024-04-24 PROCEDURE — 84132 ASSAY OF SERUM POTASSIUM: CPT

## 2024-04-24 PROCEDURE — 88305 TISSUE EXAM BY PATHOLOGIST: CPT

## 2024-04-24 PROCEDURE — 49083 ABD PARACENTESIS W/IMAGING: CPT | Performed by: RADIOLOGY

## 2024-04-24 PROCEDURE — 99222 1ST HOSP IP/OBS MODERATE 55: CPT | Performed by: INTERNAL MEDICINE

## 2024-04-24 PROCEDURE — 94660 CPAP INITIATION&MGMT: CPT

## 2024-04-24 PROCEDURE — P9047 ALBUMIN (HUMAN), 25%, 50ML: HCPCS | Performed by: NURSE PRACTITIONER

## 2024-04-24 PROCEDURE — 89051 BODY FLUID CELL COUNT: CPT

## 2024-04-24 PROCEDURE — 82150 ASSAY OF AMYLASE: CPT

## 2024-04-24 PROCEDURE — 88112 CYTOPATH CELL ENHANCE TECH: CPT

## 2024-04-24 PROCEDURE — 2000000000 HC ICU R&B

## 2024-04-24 PROCEDURE — 6370000000 HC RX 637 (ALT 250 FOR IP): Performed by: NURSE PRACTITIONER

## 2024-04-24 PROCEDURE — 99232 SBSQ HOSP IP/OBS MODERATE 35: CPT | Performed by: NURSE PRACTITIONER

## 2024-04-24 PROCEDURE — 6370000000 HC RX 637 (ALT 250 FOR IP): Performed by: INTERNAL MEDICINE

## 2024-04-24 PROCEDURE — 87070 CULTURE OTHR SPECIMN AEROBIC: CPT

## 2024-04-24 PROCEDURE — 6370000000 HC RX 637 (ALT 250 FOR IP): Performed by: STUDENT IN AN ORGANIZED HEALTH CARE EDUCATION/TRAINING PROGRAM

## 2024-04-24 PROCEDURE — 2700000000 HC OXYGEN THERAPY PER DAY

## 2024-04-24 PROCEDURE — 2580000003 HC RX 258: Performed by: INTERNAL MEDICINE

## 2024-04-24 PROCEDURE — 80202 ASSAY OF VANCOMYCIN: CPT

## 2024-04-24 PROCEDURE — 85018 HEMOGLOBIN: CPT

## 2024-04-24 PROCEDURE — 94003 VENT MGMT INPAT SUBQ DAY: CPT

## 2024-04-24 PROCEDURE — 84295 ASSAY OF SERUM SODIUM: CPT

## 2024-04-24 PROCEDURE — 80053 COMPREHEN METABOLIC PANEL: CPT

## 2024-04-24 PROCEDURE — 49083 ABD PARACENTESIS W/IMAGING: CPT

## 2024-04-24 PROCEDURE — 84157 ASSAY OF PROTEIN OTHER: CPT

## 2024-04-24 PROCEDURE — 2500000003 HC RX 250 WO HCPCS: Performed by: NURSE PRACTITIONER

## 2024-04-24 PROCEDURE — 2500000003 HC RX 250 WO HCPCS: Performed by: INTERNAL MEDICINE

## 2024-04-24 PROCEDURE — 82948 REAGENT STRIP/BLOOD GLUCOSE: CPT

## 2024-04-24 PROCEDURE — 85025 COMPLETE CBC W/AUTO DIFF WBC: CPT

## 2024-04-24 PROCEDURE — 83605 ASSAY OF LACTIC ACID: CPT

## 2024-04-24 PROCEDURE — 99223 1ST HOSP IP/OBS HIGH 75: CPT | Performed by: NURSE PRACTITIONER

## 2024-04-24 PROCEDURE — 85014 HEMATOCRIT: CPT

## 2024-04-24 PROCEDURE — 87205 SMEAR GRAM STAIN: CPT

## 2024-04-24 PROCEDURE — 82945 GLUCOSE OTHER FLUID: CPT

## 2024-04-24 PROCEDURE — 85610 PROTHROMBIN TIME: CPT

## 2024-04-24 PROCEDURE — 2709999900 IR US GUIDED PARACENTESIS

## 2024-04-24 PROCEDURE — 2580000003 HC RX 258: Performed by: STUDENT IN AN ORGANIZED HEALTH CARE EDUCATION/TRAINING PROGRAM

## 2024-04-24 RX ORDER — INSULIN LISPRO 100 [IU]/ML
4 INJECTION, SOLUTION INTRAVENOUS; SUBCUTANEOUS 4 TIMES DAILY
Status: DISCONTINUED | OUTPATIENT
Start: 2024-04-24 | End: 2024-04-27

## 2024-04-24 RX ORDER — LIDOCAINE HYDROCHLORIDE 20 MG/ML
INJECTION, SOLUTION INFILTRATION; PERINEURAL PRN
Status: COMPLETED | OUTPATIENT
Start: 2024-04-24 | End: 2024-04-24

## 2024-04-24 RX ORDER — INSULIN GLARGINE 100 [IU]/ML
25 INJECTION, SOLUTION SUBCUTANEOUS NIGHTLY
Status: DISCONTINUED | OUTPATIENT
Start: 2024-04-24 | End: 2024-04-25

## 2024-04-24 RX ORDER — INSULIN GLARGINE 100 [IU]/ML
15 INJECTION, SOLUTION SUBCUTANEOUS NIGHTLY
Status: DISCONTINUED | OUTPATIENT
Start: 2024-04-24 | End: 2024-04-24

## 2024-04-24 RX ORDER — ALBUMIN (HUMAN) 12.5 G/50ML
SOLUTION INTRAVENOUS CONTINUOUS PRN
Status: COMPLETED | OUTPATIENT
Start: 2024-04-24 | End: 2024-04-24

## 2024-04-24 RX ADMIN — Medication 9 MCG/MIN: at 13:16

## 2024-04-24 RX ADMIN — INSULIN LISPRO 4 UNITS: 100 INJECTION, SOLUTION INTRAVENOUS; SUBCUTANEOUS at 13:12

## 2024-04-24 RX ADMIN — SODIUM CHLORIDE, PRESERVATIVE FREE 40 MG: 5 INJECTION INTRAVENOUS at 22:10

## 2024-04-24 RX ADMIN — Medication 10 ML: at 19:57

## 2024-04-24 RX ADMIN — ARIPIPRAZOLE 5 MG: 5 TABLET ORAL at 07:59

## 2024-04-24 RX ADMIN — MUPIROCIN: 20 OINTMENT TOPICAL at 13:24

## 2024-04-24 RX ADMIN — SODIUM CHLORIDE, PRESERVATIVE FREE 40 MG: 5 INJECTION INTRAVENOUS at 10:48

## 2024-04-24 RX ADMIN — MUPIROCIN: 20 OINTMENT TOPICAL at 20:53

## 2024-04-24 RX ADMIN — HYDROXYZINE HYDROCHLORIDE 25 MG: 25 TABLET ORAL at 08:00

## 2024-04-24 RX ADMIN — METOCLOPRAMIDE HYDROCHLORIDE 10 MG: 5 INJECTION INTRAMUSCULAR; INTRAVENOUS at 17:31

## 2024-04-24 RX ADMIN — INSULIN GLARGINE 25 UNITS: 100 INJECTION, SOLUTION SUBCUTANEOUS at 20:48

## 2024-04-24 RX ADMIN — SEVELAMER CARBONATE 800 MG: 800 TABLET, FILM COATED ORAL at 07:59

## 2024-04-24 RX ADMIN — INSULIN LISPRO 4 UNITS: 100 INJECTION, SOLUTION INTRAVENOUS; SUBCUTANEOUS at 00:14

## 2024-04-24 RX ADMIN — ALBUMIN (HUMAN) 25 G: 12.5 SOLUTION INTRAVENOUS at 15:28

## 2024-04-24 RX ADMIN — INSULIN LISPRO 8 UNITS: 100 INJECTION, SOLUTION INTRAVENOUS; SUBCUTANEOUS at 06:10

## 2024-04-24 RX ADMIN — INSULIN LISPRO 4 UNITS: 100 INJECTION, SOLUTION INTRAVENOUS; SUBCUTANEOUS at 17:27

## 2024-04-24 RX ADMIN — HYDROCORTISONE SODIUM SUCCINATE 100 MG: 100 INJECTION, POWDER, FOR SOLUTION INTRAMUSCULAR; INTRAVENOUS at 13:48

## 2024-04-24 RX ADMIN — MIDODRINE HYDROCHLORIDE 20 MG: 5 TABLET ORAL at 05:48

## 2024-04-24 RX ADMIN — DOCUSATE SODIUM 100 MG: 50 LIQUID ORAL at 07:59

## 2024-04-24 RX ADMIN — INSULIN LISPRO 4 UNITS: 100 INJECTION, SOLUTION INTRAVENOUS; SUBCUTANEOUS at 17:24

## 2024-04-24 RX ADMIN — METOCLOPRAMIDE HYDROCHLORIDE 10 MG: 5 INJECTION INTRAMUSCULAR; INTRAVENOUS at 10:52

## 2024-04-24 RX ADMIN — MIDODRINE HYDROCHLORIDE 20 MG: 5 TABLET ORAL at 10:50

## 2024-04-24 RX ADMIN — Medication 10 ML: at 08:13

## 2024-04-24 RX ADMIN — CHLORHEXIDINE GLUCONATE, 0.12% ORAL RINSE 15 ML: 1.2 SOLUTION DENTAL at 07:59

## 2024-04-24 RX ADMIN — INSULIN LISPRO 8 UNITS: 100 INJECTION, SOLUTION INTRAVENOUS; SUBCUTANEOUS at 13:13

## 2024-04-24 RX ADMIN — INSULIN LISPRO 4 UNITS: 100 INJECTION, SOLUTION INTRAVENOUS; SUBCUTANEOUS at 21:01

## 2024-04-24 RX ADMIN — SODIUM CHLORIDE, PRESERVATIVE FREE 10 ML: 5 INJECTION INTRAVENOUS at 08:12

## 2024-04-24 RX ADMIN — INSULIN LISPRO 4 UNITS: 100 INJECTION, SOLUTION INTRAVENOUS; SUBCUTANEOUS at 10:46

## 2024-04-24 RX ADMIN — METOCLOPRAMIDE HYDROCHLORIDE 10 MG: 5 INJECTION INTRAMUSCULAR; INTRAVENOUS at 00:02

## 2024-04-24 RX ADMIN — MEROPENEM 500 MG: 500 INJECTION, POWDER, FOR SOLUTION INTRAVENOUS at 10:58

## 2024-04-24 RX ADMIN — LACTULOSE: 10 SOLUTION ORAL; RECTAL at 11:10

## 2024-04-24 RX ADMIN — METOCLOPRAMIDE HYDROCHLORIDE 10 MG: 5 INJECTION INTRAMUSCULAR; INTRAVENOUS at 05:48

## 2024-04-24 RX ADMIN — LIDOCAINE HYDROCHLORIDE 10 ML: 20 INJECTION, SOLUTION INFILTRATION; PERINEURAL at 14:56

## 2024-04-24 RX ADMIN — INSULIN LISPRO 4 UNITS: 100 INJECTION, SOLUTION INTRAVENOUS; SUBCUTANEOUS at 20:48

## 2024-04-24 RX ADMIN — CHLORHEXIDINE GLUCONATE, 0.12% ORAL RINSE 15 ML: 1.2 SOLUTION DENTAL at 20:48

## 2024-04-24 RX ADMIN — HYDROCORTISONE SODIUM SUCCINATE 100 MG: 100 INJECTION, POWDER, FOR SOLUTION INTRAMUSCULAR; INTRAVENOUS at 19:58

## 2024-04-24 RX ADMIN — METOCLOPRAMIDE HYDROCHLORIDE 10 MG: 5 INJECTION INTRAMUSCULAR; INTRAVENOUS at 22:43

## 2024-04-24 RX ADMIN — SODIUM CHLORIDE, PRESERVATIVE FREE 10 ML: 5 INJECTION INTRAVENOUS at 19:57

## 2024-04-24 RX ADMIN — HYDROCORTISONE SODIUM SUCCINATE 50 MG: 100 INJECTION, POWDER, FOR SOLUTION INTRAMUSCULAR; INTRAVENOUS at 08:11

## 2024-04-24 RX ADMIN — POLYETHYLENE GLYCOL 3350 17 G: 17 POWDER, FOR SOLUTION ORAL at 07:59

## 2024-04-24 RX ADMIN — HYDROCORTISONE SODIUM SUCCINATE 50 MG: 100 INJECTION, POWDER, FOR SOLUTION INTRAMUSCULAR; INTRAVENOUS at 00:02

## 2024-04-24 ASSESSMENT — PULMONARY FUNCTION TESTS
PIF_VALUE: 22
PIF_VALUE: 23
PIF_VALUE: 20
PIF_VALUE: 24
PIF_VALUE: 30
PIF_VALUE: 21
PIF_VALUE: 24
PIF_VALUE: 26
PIF_VALUE: 25
PIF_VALUE: 20
PIF_VALUE: 10
PIF_VALUE: 24
PIF_VALUE: 18
PIF_VALUE: 23
PIF_VALUE: 22
PIF_VALUE: 24
PIF_VALUE: 28
PIF_VALUE: 26
PIF_VALUE: 23
PIF_VALUE: 25
PIF_VALUE: 19
PIF_VALUE: 24
PIF_VALUE: 25
PIF_VALUE: 25
PIF_VALUE: 24
PIF_VALUE: 24

## 2024-04-24 ASSESSMENT — PAIN SCALES - GENERAL
PAINLEVEL_OUTOF10: 0

## 2024-04-24 NOTE — INTERDISCIPLINARY ROUNDS
Spiritual Care Services     Summary of Visit:   participated in ICU rounds. Patient intubated and undergoing a breathing tial this morning. Patient to possibly have a procedure later today.    Encounter Summary  Encounter Overview/Reason : Interdisciplinary rounds  Service Provided For:: Patient  Referral/Consult From:: Rounding  Support System: Children, Family members  Last Encounter : 04/21/24  Complexity of Encounter: Moderate  Begin Time: 1000  End Time : 1020  Total Time Calculated: 20 min        Spiritual/Emotional needs  Type: Spiritual Support     Grief, Loss, and Adjustments  Type: Adjustment to illness                Spiritual Assessment/Intervention/Outcomes:    Assessment: Compromised coping    Intervention: Prayer (assurance of)/[place], Sustaining Presence/Ministry of presence    Outcome: Receptive      Care Plan:    Plan and Referrals  Plan/Referrals: Continue Support (comment)    Provide continued spiritual support as needed or requested      Spiritual Care Services   Electronically signed by Chaplain Pearl on 4/24/2024 at 10:03 AM.    To reach a  for emotional and spiritual support, place an EPIC consult request.   If a  is needed immediately, dial “0” and ask to page the on-call .

## 2024-04-24 NOTE — CONSULTS
Wound Care Consult Note       NAME:  Michelle Le  MEDICAL RECORD NUMBER:  [id number]  AGE: 64 y.o.   GENDER: female  : 1960  TODAY'S DATE:  2024    Subjective   Reason for [facility] Nurse Evaluation and Assessment: left hand      Michelle Le is a 64 y.o. female referred by:   [x] Physician  [] Nursing  [] Other:     Wound Identification:  Wound Type: traumatic  Contributing Factors: diabetes    Wound History: Patient admitted with traumatic hand of left posterior hand. Patient is known to wound care from previous admissions. Patient states she picks her arms and has to keep them covered. Patient states she picked a scab off her hand.   Current Wound Care Treatment:  Recommending 1) Recommending 1) continue pressure injury interventions including low air-loss mattress and border foam dressings for prevention as needed 2) Cleanse wound with saline 3) Apply mupirocin to wound 4) Cover with 4x4 and roll gauze. Cover left forearm as well to keep her from picking stable scabs. Patient to follow up in wound center if not healed by discharge.     Patient Goal of Care:  [x] Wound Healing  [] Odor Control  [] Palliative Care  [] Pain Control   [] Other:         PAST MEDICAL HISTORY        Diagnosis Date    Angina at rest ([facility]) 2020    Anxiety     CAD S/P percutaneous coronary angioplasty ,     stents per Huong Sanabria    CHF (congestive heart failure) ([facility])     CKD (chronic kidney disease) stage 4, GFR 15-29 ml/min ([facility]) 2018    CKD stage 4 due to type 2 diabetes mellitus ([facility])     Contusion of right chest wall 2021    COPD (chronic obstructive pulmonary disease) ([facility])     Diabetic nephropathy with proteinuria ([facility])     DJD (degenerative joint disease) of knee     Dr Sharma    GERD (gastroesophageal reflux disease)     Hemiparesis, left ([facility])     entered Assisted Living ([facility] [place] [facility])    Hemodialysis patient ([facility])     Hemodialysis-associated hypotension 10/22/2021 
Pulmonary and Critical Care Medicine  Consult Note  Encounter Date: 2024 12:30 PM    Ms. Michelle Le is a 64 y.o. female  : 1960  Requesting Provider: Stephani Burton MD   Reason for request: ICU management            HISTORY OF PRESENT ILLNESS:    Michelle Le is a 64 y.o., female who has a past medical history of  has a past medical history of Angina at rest ([facility]), Anxiety, CAD S/P percutaneous coronary angioplasty, CHF (congestive heart failure) ([facility]), CKD (chronic kidney disease) stage 4, GFR 15-29 ml/min (HCC), CKD stage 4 due to type 2 diabetes mellitus ([facility]), Contusion of right chest wall, COPD (chronic obstructive pulmonary disease) ([facility]), Diabetic nephropathy with proteinuria ([facility]), DJD (degenerative joint disease) of knee, GERD (gastroesophageal reflux disease), Hemiparesis, left ([facility]), Hemodialysis patient ([facility]), Hemodialysis-associated hypotension, History of heart failure, History of seizures, History of type C viral hepatitis, HTN (hypertension), Hyperlipidemia, Impaired mobility and activities of daily living, Infection of venous access port, Mediastinal lymphadenopathy, Metabolic syndrome, Moderate persistent asthma without complication, Need for extended care facility, Neurogenic urinary incontinence, Neuropathy in diabetes ([facility]), Nonrheumatic mitral valve regurgitation, Nonrheumatic tricuspid valve regurgitation, Obesity (BMI 30-39.9), Recurrent UTI, S/P colonoscopy, Schizophrenia, paranoid, chronic ([facility]), Small vessel disease, cerebrovascular, Status post total knee replacement, right, Status post total left knee replacement, Thrush, 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 that is hospitalized for GI bleed    HPI   Patient was brought into the ER from 
Renal Progress Note    Assessment and Plan:    64-year-old lady with end-stage renal disease on hemodialysis Tuesday Thursday Saturday.  Chronic hypotension maintained on large dose midodrine.  Admitted with SOB last time and was sig above dry weight.  Transferred to [facility] on levo.  Doesn't make urine.  EF is normal.   Her DW is at 97 kg as outpt.  Transferred back with bleeding.  On eliquis.  INR > 10.      Plan/  1-hold off on hd today with having to stick fistula and INR > 10.  Would rather wait til tomrrow with no urgent indication today.  Got vit k  2. Levo as needed         Patient Active Problem List:     Coronary artery disease involving native coronary artery of native heart with angina pectoris (HCC)     Schizophrenia, paranoid, chronic     Metabolic syndrome     Vitamin B 12 deficiency     Cerebral microvascular disease     Mixed hyperlipidemia     Other hammer toe (acquired)     Vitamin D insufficiency     Incontinence of urine     Diabetic nephropathy with proteinuria (HCC)     Essential (primary) hypertension     History of type C viral hepatitis     Urinary incontinence due to cognitive impairment     History of seizures     Stented coronary artery-plan is to stay on Plavix indefinately per Dr Walker     Diabetes mellitus (HCC)     Controlled type 2 diabetes mellitus with diabetic neuropathy, with long-term current use of insulin (HCC)     Hemiparesis, left (HCC)     Angina, class II (HCC)     Pain, unspecified     Tardive dyskinesia     Shortness of breath     Uncontrolled type 2 diabetes mellitus with hyperglycemia (HCC)     Chronic diastolic congestive heart failure (HCC)     [place] (obstructive sleep apnea)     Pulmonary hypertension, unspecified (HCC)     Class 2 severe obesity with serious comorbidity and body mass index (BMI) of 36.0 to 36.9 in adult (HCC)     Edema     Closed supracondylar fracture of right humerus     Other chronic pain     Palliative care patient     Recurrent falls     Renal 
treatments, medications, patient's current condition and answered all questions.  Encouraged patient that if she wishes to return home she will need to continue to work with therapy and make small progress of steps while making healthy lifestyle changes.  Reviewed benefits of palliative care in the outpatient setting if patient is able to return home or to a nursing facility.  Patient and patient's family appreciate information given and time spent.    Most recent notable labs: Chloride 90, BUN 24, creatinine 3.64, anion gap 18, estimated GFR 13.2, total bilirubin 0.9, WBC 14.0, RBC 2.56, hemoglobin 8.6, hematocrit 25.8, PTT 47.9, INR 5.3, APTT 52.8      Review of Systems:       Review of Systems   Constitutional:  Negative for activity change, appetite change, fatigue, fever and unexpected weight change.   HENT:  Negative for trouble swallowing.    Respiratory:  Positive for cough and shortness of breath.    Cardiovascular:  Positive for leg swelling. Negative for chest pain.   Gastrointestinal:  Negative for constipation and nausea.   Musculoskeletal:  Positive for gait problem. Negative for back pain.   Skin:  Positive for color change and wound. Negative for rash.   Neurological:  Positive for weakness. Negative for dizziness and headaches.   Psychiatric/Behavioral:  Negative for dysphoric mood and sleep disturbance. The patient is not nervous/anxious.        Physical Examination:       BP (!) 95/54   Pulse 87   Temp 98.8 °F (37.1 °C) (Oral)   Resp 20   Wt 98.9 kg (218 lb 0.6 oz)   LMP  (LMP Unknown)   SpO2 100%   BMI 34.14 kg/m²      Physical Exam  Constitutional:       General: She is awake. She is not in acute distress.     Appearance: She is obese. She is ill-appearing.   HENT:      Head: Normocephalic and atraumatic.      Jaw: There is normal jaw occlusion.      Right Ear: External ear normal.      Left Ear: External ear normal.      Nose: Nose normal. No congestion.      Mouth/Throat:      Lips: 
use of insulin ([facility])    Hemiparesis, left ([facility])    Angina, class II ([facility])    Pain, unspecified    Tardive dyskinesia    Shortness of breath    Uncontrolled type 2 diabetes mellitus with hyperglycemia ([facility])    Chronic diastolic congestive heart failure ([facility])    [place] (obstructive sleep apnea)    Pulmonary hypertension, unspecified ([facility])    Class 2 severe obesity with serious comorbidity and body mass index (BMI) of 36.0 to 36.9 in adult ([facility])    Edema    Closed supracondylar fracture of right humerus    Other chronic pain    Palliative care patient    Recurrent falls    Renal failure    Difficulty in walking    ESRD (end stage renal disease) on dialysis ([facility])    Weakness    Other seizures ([facility])    Moderate persistent asthma without complication    COVID-19    Post PTCA    Falls frequently    Chest wall contusion, left, initial encounter    Headache, unspecified    Paranoid schizophrenia ([facility])    Compression fracture of spine ([facility])    Closed rib fracture    Depression    Chronic obstructive pulmonary disease ([facility])    Critical illness polyneuropathy ([facility])    Multiple closed fractures of ribs of right side    Nonrheumatic mitral valve regurgitation    Nonrheumatic tricuspid valve regurgitation    Need for extended care facility    Chronic pain of both shoulders    Hemodialysis-associated hypotension    Anginal chest pain at rest ([facility])    Chest pain    Unstable angina ([facility])    NSTEMI (non-ST elevated myocardial infarction) ([facility])    CKD (chronic kidney disease) stage 4, GFR 15-29 ml/min ([facility])    Hyperkalemia    Impaired mobility and activities of daily living dt polyneuropathy and reccent fall     Dialysis patient ([facility])    Unspecified open wound of right upper arm, initial encounter    Multiple closed fractures of right lower extremity and ribs    Closed T11 fracture ([facility])    Encephalopathy acute    MRSA bacteremia    Sepsis due to Enterococcus (HCC)    Local infection due to central venous catheter    DJD (degenerative 
MD      D:  04/24/2024 13:05:37     T:  04/24/2024 17:15:33     GENO/JEREMIE  Job #:  [id number]     Doc#:  [id number]     
123/50   Pulse 87   Temp 97.6 °F (36.4 °C) (Axillary)   Resp 22   Ht 1.702 m (5' 7\")   Wt 97.8 kg (215 lb 9.8 oz)   LMP  (LMP Unknown)   SpO2 99%   BMI 33.77 kg/m²      Physical Examination:      Physical Exam  Constitutional:       General: She is not in acute distress.     Appearance: Normal appearance. She is obese. She is ill-appearing.   HENT:      Head: Normocephalic and atraumatic.      Nose: Nose normal.      Mouth/Throat:      Mouth: Mucous membranes are moist.   Eyes:      General: No scleral icterus.     Conjunctiva/sclera: Conjunctivae normal.   Cardiovascular:      Rate and Rhythm: Normal rate and regular rhythm.      Pulses: Normal pulses.      Heart sounds: Normal heart sounds.   Pulmonary:      Effort: Pulmonary effort is normal. No respiratory distress.      Breath sounds: Normal breath sounds.   Abdominal:      General: Bowel sounds are normal. There is distension (grade 2 ascites, not tense).      Palpations: Abdomen is soft.      Comments: +central obesity   Musculoskeletal:         General: Normal range of motion.      Cervical back: Normal range of motion and neck supple.   Lymphadenopathy:      Cervical: No cervical adenopathy.   Skin:     General: Skin is warm and dry.      Coloration: Skin is not jaundiced.   Neurological:      General: No focal deficit present.      Mental Status: She is alert and oriented to person, place, and time. Mental status is at baseline.   Psychiatric:         Mood and Affect: Mood normal.         Behavior: Behavior normal.         Thought Content: Thought content normal.         Judgment: Judgment normal.       Lab Results   Component Value Date    WBC 21.8 (H) 04/24/2024    HGB 10.9 (L) 04/24/2024    HCT 30.7 (L) 04/24/2024    .4 (H) 04/24/2024     (L) 04/24/2024     Lab Results   Component Value Date     (L) 04/24/2024    K 4.4 04/24/2024    CL 91 (L) 04/24/2024    CO2 19 (L) 04/24/2024    BUN 65 (H) 04/24/2024    CREATININE 5.4 (HH) 
dialysis (HCC)    Weakness    Other seizures ([facility])    Moderate persistent asthma without complication    COVID-19    Post PTCA    Falls frequently    Chest wall contusion, left, initial encounter    Headache, unspecified    Paranoid schizophrenia (HCC)    Compression fracture of spine (HCC)    Closed rib fracture    Depression    Chronic obstructive pulmonary disease (HCC)    Critical illness polyneuropathy (HCC)    Multiple closed fractures of ribs of right side    Nonrheumatic mitral valve regurgitation    Nonrheumatic tricuspid valve regurgitation    Need for extended care facility    Chronic pain of both shoulders    Hemodialysis-associated hypotension    Anginal chest pain at rest (HCC)    Chest pain    Unstable angina ([facility])    NSTEMI (non-ST elevated myocardial infarction) ([facility])    CKD (chronic kidney disease) stage 4, GFR 15-29 ml/min ([facility])    Hyperkalemia    Impaired mobility and activities of daily living dt polyneuropathy and reccent fall     Dialysis patient ([facility])    Unspecified open wound of right upper arm, initial encounter    Multiple closed fractures of right lower extremity and ribs    Closed T11 fracture ([facility])    Encephalopathy acute    MRSA bacteremia    Sepsis due to Enterococcus ([facility])    Local infection due to central venous catheter    DJD (degenerative joint disease), cervical    Closed head injury    Sarcopenia    Fall from standing    Sepsis due to skin infection (HCC)    Chronic hepatitis C (HCC)    Catheter-related bloodstream infection    Enterococcus faecalis infection    Cervical stenosis of spinal canal    Ataxic gait    Lumbar stenosis with neurogenic claudication    Falls infrequently    Infection of venous access port    Diarrhea    Anemia, unspecified    Eczema    AMS (altered mental status)    Heart failure ([facility])    Interstitial lung disease ([facility])    Cellulitis of left hand    Chronic osteomyelitis of knee (HCC)    Generalized weakness    Septic shock ([facility])    Fluid overload    
or tremor.   Psych: Normal affect    Results/ Medications reviewed 4/16/2024, 1:37 PM     Laboratory, Microbiology, Pathology, Radiology, Cardiology, Medications and Transcriptions reviewed  Scheduled Meds:   sodium chloride flush  5-40 mL IntraVENous 2 times per day    pantoprazole (PROTONIX) 40 mg in sodium chloride (PF) 0.9 % 10 mL injection  40 mg IntraVENous Q12H    insulin lispro  0-16 Units SubCUTAneous Q4H     Continuous Infusions:   sodium chloride      dextrose         Recent Labs     04/16/24 0915   WBC 11.6*   HGB 8.7*   HCT 26.5*   .9*        Recent Labs     04/16/24 0915   *   K 5.1*   CL 90*   CO2 21   BUN 51*   CREATININE 5.88*     Recent Labs     04/16/24 0915   AST 16   ALT <5   BILITOT 0.8*   ALKPHOS 75     No results for input(s): \"LIPASE\", \"AMYLASE\" in the last 72 hours.  Recent Labs     04/16/24 0915   PROT 6.6   INR >10.0*     CT ABDOMEN PELVIS WO CONTRAST Additional Contrast? None    Result Date: 4/16/2024  EXAMINATION: CT OF THE ABDOMEN AND PELVIS WITHOUT CONTRAST 4/16/2024 12:10 pm TECHNIQUE: CT of the abdomen and pelvis was performed without the administration of intravenous contrast. Multiplanar reformatted images are provided for review. Automated exposure control, iterative reconstruction, and/or weight based adjustment of the mA/kV was utilized to reduce the radiation dose to as low as reasonably achievable. COMPARISON: None. HISTORY: ORDERING SYSTEM PROVIDED HISTORY: evaluate for retroperitoneal bleed TECHNOLOGIST PROVIDED HISTORY: Reason for exam:->evaluate for retroperitoneal bleed Additional Contrast?->None What reading provider will be dictating this exam?->CRC FINDINGS: Lower Chest: Cardiomegaly seen with small effusions and mild chronic reticulation of lung markings Organs: Slightly nodular contour of the liver.  Small gallstone.  Advanced pancreatic atrophy.  The spleen is normal in size.  There is advanced bilateral renal atrophy. GI/Bowel: Small hiatal

## 2024-04-24 NOTE — OR NURSING
PARACENTESIS - NO SEDATION    1446  - Patient arrived to hospitalss via bed, from ICU, accompanied by RN & transport. Patient lethargic, doesn't verbally respond to questioning. Positioned lying propped to left side using pillow wedge. Initial images obtained prior to start of procedure using U/S. ICU monitor maintained, ART line present. VSS, on 4L NC. Consent confirmed - obtained via phone conversation with daughter.       1452 - Timeout completed for Ultrasound Guided Paracentesis. Left side of abdomen cleansed with Chloraprep. After 3 minute dry time, covered with sterile drape and towels.    1456 - Site numbed with 2% lidocaine, see eMAR.  [id number] Centesis 5F catheter placed using Ultrasound guidance.  Fluid appears \"rust colored / foul odor present.\" Sample of fluid obtained and placed into specimen containers. Catheter tubing then connected to [employer] machine to remove fluid.    1529 - Pt tolerated well. Total 5280 ml removed. Catheter removed, pressure held and bandaid applied. Verbal and tactile reassurance given throughout. Report given to FLAQUITO Ramirez in department. Transport requested to return patient to her room. Electronically signed by Della Lopez RN on 4/24/2024 at 3:38 PM

## 2024-04-25 ENCOUNTER — APPOINTMENT (OUTPATIENT)
Dept: GENERAL RADIOLOGY | Age: 64
DRG: 813 | End: 2024-04-25
Payer: MEDICARE

## 2024-04-25 ENCOUNTER — APPOINTMENT (OUTPATIENT)
Dept: ULTRASOUND IMAGING | Age: 64
DRG: 813 | End: 2024-04-25
Payer: MEDICARE

## 2024-04-25 LAB
ALBUMIN FLUID: 2.6 G/DL
ALBUMIN SERPL-MCNC: 4.1 G/DL (ref 3.5–4.6)
ALP SERPL-CCNC: 74 U/L (ref 40–130)
ALT SERPL-CCNC: <5 U/L (ref 0–33)
AMYLASE FLUID: 13 U/L
ANION GAP SERPL CALCULATED.3IONS-SCNC: 20 MEQ/L (ref 9–15)
AST SERPL-CCNC: 12 U/L (ref 0–35)
BACTERIA BLD CULT ORG #2: NORMAL
BACTERIA BLD CULT: NORMAL
BASE EXCESS ARTERIAL: -1 (ref -3–3)
BASE EXCESS ARTERIAL: -2 (ref -3–3)
BASOPHILS # BLD: 0 K/UL (ref 0–0.2)
BASOPHILS NFR BLD: 0.1 %
BILIRUB SERPL-MCNC: 1.1 MG/DL (ref 0.2–0.7)
BUN SERPL-MCNC: 76 MG/DL (ref 8–23)
CALCIUM IONIZED: 1.15 MMOL/L (ref 1.12–1.32)
CALCIUM IONIZED: 1.18 MMOL/L (ref 1.12–1.32)
CALCIUM SERPL-MCNC: 9.6 MG/DL (ref 8.5–9.9)
CHLORIDE SERPL-SCNC: 94 MEQ/L (ref 95–107)
CO2 SERPL-SCNC: 23 MEQ/L (ref 20–31)
CREAT SERPL-MCNC: 5.75 MG/DL (ref 0.5–0.9)
EOSINOPHIL # BLD: 0 K/UL (ref 0–0.7)
EOSINOPHIL NFR BLD: 0 %
ERYTHROCYTE [DISTWIDTH] IN BLOOD BY AUTOMATED COUNT: 20.3 % (ref 11.5–14.5)
GLOBULIN SER CALC-MCNC: 2 G/DL (ref 2.3–3.5)
GLUCOSE BLD-MCNC: 106 MG/DL (ref 70–99)
GLUCOSE BLD-MCNC: 116 MG/DL (ref 70–99)
GLUCOSE BLD-MCNC: 126 MG/DL (ref 70–99)
GLUCOSE BLD-MCNC: 130 MG/DL (ref 70–99)
GLUCOSE BLD-MCNC: 140 MG/DL (ref 70–99)
GLUCOSE BLD-MCNC: 149 MG/DL (ref 70–99)
GLUCOSE BLD-MCNC: 153 MG/DL (ref 70–99)
GLUCOSE BLD-MCNC: 241 MG/DL (ref 70–99)
GLUCOSE SERPL-MCNC: 189 MG/DL (ref 70–99)
HCO3 ARTERIAL: 23.2 MMOL/L (ref 21–29)
HCO3 ARTERIAL: 24.8 MMOL/L (ref 21–29)
HCT VFR BLD AUTO: 29.7 % (ref 37–47)
HCT VFR BLD AUTO: 31 % (ref 36–48)
HCT VFR BLD AUTO: 32 % (ref 36–48)
HGB BLD CALC-MCNC: 10.4 GM/DL (ref 12–16)
HGB BLD CALC-MCNC: 10.8 GM/DL (ref 12–16)
HGB BLD-MCNC: 9.8 G/DL (ref 12–16)
INR PPP: 1.3
LACTATE DEHYDROGENASE, FLUID: 91 U/L
LACTATE: 0.74 MMOL/L (ref 0.4–2)
LACTATE: 0.76 MMOL/L (ref 0.4–2)
LYMPHOCYTES # BLD: 0.5 K/UL (ref 1–4.8)
LYMPHOCYTES NFR BLD: 2.4 %
MCH RBC QN AUTO: 34.5 PG (ref 27–31.3)
MCHC RBC AUTO-ENTMCNC: 33 % (ref 33–37)
MCV RBC AUTO: 104.6 FL (ref 79.4–94.8)
MONOCYTES # BLD: 0.7 K/UL (ref 0.2–0.8)
MONOCYTES NFR BLD: 3.4 %
NEUTROPHILS # BLD: 17.9 K/UL (ref 1.4–6.5)
NEUTS SEG NFR BLD: 93.4 %
O2 SAT, ARTERIAL: 100 % (ref 93–100)
O2 SAT, ARTERIAL: 99 % (ref 93–100)
PATH CONSULT FLUID: NORMAL
PCO2 ARTERIAL: 39 MM HG (ref 35–45)
PCO2 ARTERIAL: 45 MM HG (ref 35–45)
PERFORMED ON: ABNORMAL
PH ARTERIAL: 7.35 (ref 7.35–7.45)
PH ARTERIAL: 7.39 (ref 7.35–7.45)
PLATELET # BLD AUTO: 108 K/UL (ref 130–400)
PO2 ARTERIAL: 140 MM HG (ref 75–108)
PO2 ARTERIAL: 176 MM HG (ref 75–108)
POC CHLORIDE: 104 MEQ/L (ref 99–110)
POC CHLORIDE: 98 MEQ/L (ref 99–110)
POC CREATININE: 5.5 MG/DL (ref 0.6–1.2)
POC CREATININE: 5.7 MG/DL (ref 0.6–1.2)
POC FIO2: 2
POC SAMPLE TYPE: ABNORMAL
POC SAMPLE TYPE: ABNORMAL
POTASSIUM SERPL-SCNC: 4.1 MEQ/L (ref 3.5–5.1)
POTASSIUM SERPL-SCNC: 4.1 MEQ/L (ref 3.5–5.1)
POTASSIUM SERPL-SCNC: 4.4 MEQ/L (ref 3.4–4.9)
PROT SERPL-MCNC: 6.1 G/DL (ref 6.3–8)
PROTHROMBIN TIME: 16.8 SEC (ref 12.3–14.9)
RBC # BLD AUTO: 2.84 M/UL (ref 4.2–5.4)
SODIUM BLD-SCNC: 133 MEQ/L (ref 136–145)
SODIUM BLD-SCNC: 134 MEQ/L (ref 136–145)
SODIUM SERPL-SCNC: 137 MEQ/L (ref 135–144)
SPECIMEN TYPE: NORMAL
TCO2 ARTERIAL: 24 MMOL/L (ref 21–32)
TCO2 ARTERIAL: 26 MMOL/L (ref 21–32)
WBC # BLD AUTO: 19.1 K/UL (ref 4.8–10.8)

## 2024-04-25 PROCEDURE — 6370000000 HC RX 637 (ALT 250 FOR IP): Performed by: INTERNAL MEDICINE

## 2024-04-25 PROCEDURE — 83605 ASSAY OF LACTIC ACID: CPT

## 2024-04-25 PROCEDURE — 37799 UNLISTED PX VASCULAR SURGERY: CPT

## 2024-04-25 PROCEDURE — 80053 COMPREHEN METABOLIC PANEL: CPT

## 2024-04-25 PROCEDURE — 6370000000 HC RX 637 (ALT 250 FOR IP): Performed by: STUDENT IN AN ORGANIZED HEALTH CARE EDUCATION/TRAINING PROGRAM

## 2024-04-25 PROCEDURE — 6370000000 HC RX 637 (ALT 250 FOR IP): Performed by: NURSE PRACTITIONER

## 2024-04-25 PROCEDURE — 82565 ASSAY OF CREATININE: CPT

## 2024-04-25 PROCEDURE — 94761 N-INVAS EAR/PLS OXIMETRY MLT: CPT

## 2024-04-25 PROCEDURE — 99232 SBSQ HOSP IP/OBS MODERATE 35: CPT | Performed by: INTERNAL MEDICINE

## 2024-04-25 PROCEDURE — 84132 ASSAY OF SERUM POTASSIUM: CPT

## 2024-04-25 PROCEDURE — 2500000003 HC RX 250 WO HCPCS: Performed by: INTERNAL MEDICINE

## 2024-04-25 PROCEDURE — 2000000000 HC ICU R&B

## 2024-04-25 PROCEDURE — 76705 ECHO EXAM OF ABDOMEN: CPT

## 2024-04-25 PROCEDURE — C9113 INJ PANTOPRAZOLE SODIUM, VIA: HCPCS | Performed by: INTERNAL MEDICINE

## 2024-04-25 PROCEDURE — 85014 HEMATOCRIT: CPT

## 2024-04-25 PROCEDURE — 82435 ASSAY OF BLOOD CHLORIDE: CPT

## 2024-04-25 PROCEDURE — 84295 ASSAY OF SERUM SODIUM: CPT

## 2024-04-25 PROCEDURE — 82948 REAGENT STRIP/BLOOD GLUCOSE: CPT

## 2024-04-25 PROCEDURE — 8010000000 HC HEMODIALYSIS ACUTE INPT

## 2024-04-25 PROCEDURE — 85025 COMPLETE CBC W/AUTO DIFF WBC: CPT

## 2024-04-25 PROCEDURE — 2700000000 HC OXYGEN THERAPY PER DAY

## 2024-04-25 PROCEDURE — 6360000002 HC RX W HCPCS: Performed by: INTERNAL MEDICINE

## 2024-04-25 PROCEDURE — 99291 CRITICAL CARE FIRST HOUR: CPT | Performed by: INTERNAL MEDICINE

## 2024-04-25 PROCEDURE — 71045 X-RAY EXAM CHEST 1 VIEW: CPT

## 2024-04-25 PROCEDURE — 2580000003 HC RX 258: Performed by: STUDENT IN AN ORGANIZED HEALTH CARE EDUCATION/TRAINING PROGRAM

## 2024-04-25 PROCEDURE — 85610 PROTHROMBIN TIME: CPT

## 2024-04-25 PROCEDURE — 82330 ASSAY OF CALCIUM: CPT

## 2024-04-25 PROCEDURE — A4216 STERILE WATER/SALINE, 10 ML: HCPCS | Performed by: INTERNAL MEDICINE

## 2024-04-25 PROCEDURE — 99232 SBSQ HOSP IP/OBS MODERATE 35: CPT | Performed by: NURSE PRACTITIONER

## 2024-04-25 PROCEDURE — 2580000003 HC RX 258: Performed by: INTERNAL MEDICINE

## 2024-04-25 PROCEDURE — 82803 BLOOD GASES ANY COMBINATION: CPT

## 2024-04-25 RX ORDER — INSULIN LISPRO 100 [IU]/ML
0-4 INJECTION, SOLUTION INTRAVENOUS; SUBCUTANEOUS 4 TIMES DAILY
Status: DISCONTINUED | OUTPATIENT
Start: 2024-04-25 | End: 2024-04-27

## 2024-04-25 RX ORDER — SEVELAMER CARBONATE FOR ORAL SUSPENSION 800 MG/1
0.8 POWDER, FOR SUSPENSION ORAL 3 TIMES DAILY
Status: DISCONTINUED | OUTPATIENT
Start: 2024-04-25 | End: 2024-05-01 | Stop reason: HOSPADM

## 2024-04-25 RX ORDER — MODAFINIL 200 MG/1
100 TABLET ORAL DAILY
Status: DISCONTINUED | OUTPATIENT
Start: 2024-04-25 | End: 2024-05-01 | Stop reason: HOSPADM

## 2024-04-25 RX ORDER — SEVELAMER CARBONATE 800 MG/1
800 TABLET, FILM COATED ORAL 3 TIMES DAILY
Status: DISCONTINUED | OUTPATIENT
Start: 2024-04-25 | End: 2024-04-25 | Stop reason: CLARIF

## 2024-04-25 RX ORDER — INSULIN GLARGINE 100 [IU]/ML
14 INJECTION, SOLUTION SUBCUTANEOUS NIGHTLY
Status: DISCONTINUED | OUTPATIENT
Start: 2024-04-25 | End: 2024-04-27

## 2024-04-25 RX ADMIN — SERTRALINE HYDROCHLORIDE 25 MG: 25 TABLET ORAL at 19:44

## 2024-04-25 RX ADMIN — INSULIN LISPRO 4 UNITS: 100 INJECTION, SOLUTION INTRAVENOUS; SUBCUTANEOUS at 08:07

## 2024-04-25 RX ADMIN — MIDODRINE HYDROCHLORIDE 20 MG: 5 TABLET ORAL at 16:57

## 2024-04-25 RX ADMIN — SODIUM CHLORIDE, PRESERVATIVE FREE 10 ML: 5 INJECTION INTRAVENOUS at 21:43

## 2024-04-25 RX ADMIN — INSULIN LISPRO 4 UNITS: 100 INJECTION, SOLUTION INTRAVENOUS; SUBCUTANEOUS at 01:29

## 2024-04-25 RX ADMIN — MUPIROCIN: 20 OINTMENT TOPICAL at 21:44

## 2024-04-25 RX ADMIN — HYDROCORTISONE SODIUM SUCCINATE 100 MG: 100 INJECTION, POWDER, FOR SOLUTION INTRAMUSCULAR; INTRAVENOUS at 15:33

## 2024-04-25 RX ADMIN — SODIUM CHLORIDE, PRESERVATIVE FREE 40 MG: 5 INJECTION INTRAVENOUS at 15:32

## 2024-04-25 RX ADMIN — CHLORHEXIDINE GLUCONATE, 0.12% ORAL RINSE 15 ML: 1.2 SOLUTION DENTAL at 07:48

## 2024-04-25 RX ADMIN — SEVELAMER CARBONATE 800 MG: 800 TABLET, FILM COATED ORAL at 16:57

## 2024-04-25 RX ADMIN — Medication 4 MCG/MIN: at 08:13

## 2024-04-25 RX ADMIN — SODIUM CHLORIDE, PRESERVATIVE FREE 10 ML: 5 INJECTION INTRAVENOUS at 07:48

## 2024-04-25 RX ADMIN — METOCLOPRAMIDE HYDROCHLORIDE 10 MG: 5 INJECTION INTRAMUSCULAR; INTRAVENOUS at 18:05

## 2024-04-25 RX ADMIN — METOCLOPRAMIDE HYDROCHLORIDE 10 MG: 5 INJECTION INTRAMUSCULAR; INTRAVENOUS at 23:33

## 2024-04-25 RX ADMIN — DOCUSATE SODIUM 100 MG: 50 LIQUID ORAL at 19:44

## 2024-04-25 RX ADMIN — METOCLOPRAMIDE HYDROCHLORIDE 10 MG: 5 INJECTION INTRAMUSCULAR; INTRAVENOUS at 06:05

## 2024-04-25 RX ADMIN — HYDROXYZINE HYDROCHLORIDE 25 MG: 25 TABLET ORAL at 19:44

## 2024-04-25 RX ADMIN — SEVELAMER CARBONATE FOR ORAL SUSPENSION 0.8 G: 800 POWDER, FOR SUSPENSION ORAL at 21:42

## 2024-04-25 RX ADMIN — HYDROCORTISONE SODIUM SUCCINATE 100 MG: 100 INJECTION, POWDER, FOR SOLUTION INTRAMUSCULAR; INTRAVENOUS at 07:47

## 2024-04-25 RX ADMIN — HYDROCORTISONE SODIUM SUCCINATE 100 MG: 100 INJECTION, POWDER, FOR SOLUTION INTRAMUSCULAR; INTRAVENOUS at 01:52

## 2024-04-25 RX ADMIN — HYDROCORTISONE SODIUM SUCCINATE 100 MG: 100 INJECTION, POWDER, FOR SOLUTION INTRAMUSCULAR; INTRAVENOUS at 19:43

## 2024-04-25 RX ADMIN — MODAFINIL 100 MG: 200 TABLET ORAL at 16:57

## 2024-04-25 RX ADMIN — Medication 10 ML: at 07:48

## 2024-04-25 RX ADMIN — CHLORHEXIDINE GLUCONATE, 0.12% ORAL RINSE 15 ML: 1.2 SOLUTION DENTAL at 19:44

## 2024-04-25 RX ADMIN — ARIPIPRAZOLE 5 MG: 5 TABLET ORAL at 16:57

## 2024-04-25 RX ADMIN — MEROPENEM 500 MG: 500 INJECTION, POWDER, FOR SOLUTION INTRAVENOUS at 15:53

## 2024-04-25 RX ADMIN — ATORVASTATIN CALCIUM 40 MG: 40 TABLET, FILM COATED ORAL at 19:44

## 2024-04-25 RX ADMIN — POLYETHYLENE GLYCOL 3350 17 G: 17 POWDER, FOR SOLUTION ORAL at 19:44

## 2024-04-25 ASSESSMENT — PAIN SCALES - GENERAL
PAINLEVEL_OUTOF10: 0
PAINLEVEL_OUTOF10: 0

## 2024-04-25 NOTE — INTERDISCIPLINARY ROUNDS
Spiritual Care Services     Summary of Visit:  Attended interdisciplinary rounds. Pt is on NC, no family present at this time. Pt dozing, appears weak.   Spiritual Health remains available for support. Prayer ongoing, pt's dtr Paulina is her POA.     Encounter Summary  Encounter Overview/Reason: Interdisciplinary rounds  Service Provided For: Patient  Referral/Consult From: Rounding  Support System: Children, Family members  Last Encounter : 04/22/24  Complexity of Encounter: Moderate  Begin Time: 1000  End Time : 1020  Total Time Calculated: 20 min        Spiritual/Emotional needs  Type: Spiritual Support     Grief, Loss, and Adjustments  Type: Adjustment to illness                Spiritual Assessment/Intervention/Outcomes:    Assessment: Compromised coping    Intervention: Prayer (assurance of)/[place], Sustaining Presence/Ministry of presence    Outcome: Receptive      Care Plan:    Plan and Referrals  Plan/Referrals: Continue Support (comment)      Spiritual Care Services   Electronically signed by LUKAS Rosenthal on 4/25/2024 at 1:23 PM.    To reach a  for emotional and spiritual support, place an EPIC consult request.   If a  is needed immediately, dial “0” and ask to page the on-call .

## 2024-04-25 NOTE — DIALYSIS
Hemodialysis completed as ordered. 3000 ml removed with treatment. Tolerated well. Please see hard copy of record for treatment details

## 2024-04-26 LAB
ALBUMIN SERPL-MCNC: 3.9 G/DL (ref 3.5–4.6)
ANION GAP SERPL CALCULATED.3IONS-SCNC: 18 MEQ/L (ref 9–15)
ANISOCYTOSIS BLD QL SMEAR: ABNORMAL
APTT PPP: 29.9 SEC (ref 24.4–36.8)
BASOPHILS # BLD: 0 K/UL (ref 0–0.2)
BASOPHILS NFR BLD: 0.1 %
BUN SERPL-MCNC: 44 MG/DL (ref 8–23)
BURR CELLS: ABNORMAL
CALCIUM SERPL-MCNC: 9.5 MG/DL (ref 8.5–9.9)
CHLORIDE SERPL-SCNC: 91 MEQ/L (ref 95–107)
CO2 SERPL-SCNC: 26 MEQ/L (ref 20–31)
CREAT SERPL-MCNC: 4.07 MG/DL (ref 0.5–0.9)
EOSINOPHIL # BLD: 0 K/UL (ref 0–0.7)
EOSINOPHIL NFR BLD: 0 %
ERYTHROCYTE [DISTWIDTH] IN BLOOD BY AUTOMATED COUNT: 20 % (ref 11.5–14.5)
FIBRINOGEN PPP-MCNC: 179 MG/DL (ref 262–562)
GLUCOSE BLD-MCNC: 184 MG/DL (ref 70–99)
GLUCOSE BLD-MCNC: 253 MG/DL (ref 70–99)
GLUCOSE BLD-MCNC: 274 MG/DL (ref 70–99)
GLUCOSE BLD-MCNC: 305 MG/DL (ref 70–99)
GLUCOSE BLD-MCNC: 316 MG/DL (ref 70–99)
GLUCOSE SERPL-MCNC: 240 MG/DL (ref 70–99)
HCT VFR BLD AUTO: 32.1 % (ref 37–47)
HGB BLD-MCNC: 10.5 G/DL (ref 12–16)
INR PPP: 1.4
LYMPHOCYTES # BLD: 0.2 K/UL (ref 1–4.8)
LYMPHOCYTES NFR BLD: 1 %
MACROCYTES BLD QL SMEAR: ABNORMAL
MCH RBC QN AUTO: 34 PG (ref 27–31.3)
MCHC RBC AUTO-ENTMCNC: 32.7 % (ref 33–37)
MCV RBC AUTO: 103.9 FL (ref 79.4–94.8)
MONOCYTES # BLD: 0.2 K/UL (ref 0.2–0.8)
MONOCYTES NFR BLD: 1 %
NEUTROPHILS # BLD: 21.3 K/UL (ref 1.4–6.5)
NEUTS SEG NFR BLD: 98 %
PAPPENHEIMER BOD BLD QL SMEAR: ABNORMAL
PERFORMED ON: ABNORMAL
PHOSPHATE SERPL-MCNC: 6.6 MG/DL (ref 2.3–4.8)
PLATELET # BLD AUTO: 83 K/UL (ref 130–400)
PLATELET # BLD AUTO: 84 K/UL (ref 130–400)
PLATELET BLD QL SMEAR: ABNORMAL
POIKILOCYTOSIS BLD QL SMEAR: ABNORMAL
POLYCHROMASIA BLD QL SMEAR: ABNORMAL
POTASSIUM SERPL-SCNC: 3.9 MEQ/L (ref 3.4–4.9)
PROTHROMBIN TIME: 16.8 SEC (ref 12.3–14.9)
RBC # BLD AUTO: 3.09 M/UL (ref 4.2–5.4)
SLIDE REVIEW: ABNORMAL
SMUDGE CELLS BLD QL SMEAR: 3.8
SODIUM SERPL-SCNC: 135 MEQ/L (ref 135–144)
TARGETS BLD QL SMEAR: ABNORMAL
WBC # BLD AUTO: 21.7 K/UL (ref 4.8–10.8)

## 2024-04-26 PROCEDURE — 85384 FIBRINOGEN ACTIVITY: CPT

## 2024-04-26 PROCEDURE — 85049 AUTOMATED PLATELET COUNT: CPT

## 2024-04-26 PROCEDURE — 99232 SBSQ HOSP IP/OBS MODERATE 35: CPT | Performed by: INTERNAL MEDICINE

## 2024-04-26 PROCEDURE — 80069 RENAL FUNCTION PANEL: CPT

## 2024-04-26 PROCEDURE — 2000000000 HC ICU R&B

## 2024-04-26 PROCEDURE — 6360000002 HC RX W HCPCS: Performed by: INTERNAL MEDICINE

## 2024-04-26 PROCEDURE — 99232 SBSQ HOSP IP/OBS MODERATE 35: CPT | Performed by: NURSE PRACTITIONER

## 2024-04-26 PROCEDURE — 85610 PROTHROMBIN TIME: CPT

## 2024-04-26 PROCEDURE — A4216 STERILE WATER/SALINE, 10 ML: HCPCS | Performed by: INTERNAL MEDICINE

## 2024-04-26 PROCEDURE — 94640 AIRWAY INHALATION TREATMENT: CPT

## 2024-04-26 PROCEDURE — 85730 THROMBOPLASTIN TIME PARTIAL: CPT

## 2024-04-26 PROCEDURE — 2700000000 HC OXYGEN THERAPY PER DAY

## 2024-04-26 PROCEDURE — 2580000003 HC RX 258: Performed by: INTERNAL MEDICINE

## 2024-04-26 PROCEDURE — 2580000003 HC RX 258: Performed by: STUDENT IN AN ORGANIZED HEALTH CARE EDUCATION/TRAINING PROGRAM

## 2024-04-26 PROCEDURE — 94761 N-INVAS EAR/PLS OXIMETRY MLT: CPT

## 2024-04-26 PROCEDURE — 94760 N-INVAS EAR/PLS OXIMETRY 1: CPT

## 2024-04-26 PROCEDURE — 85025 COMPLETE CBC W/AUTO DIFF WBC: CPT

## 2024-04-26 PROCEDURE — 6370000000 HC RX 637 (ALT 250 FOR IP): Performed by: INTERNAL MEDICINE

## 2024-04-26 PROCEDURE — 92610 EVALUATE SWALLOWING FUNCTION: CPT

## 2024-04-26 PROCEDURE — 6370000000 HC RX 637 (ALT 250 FOR IP): Performed by: NURSE PRACTITIONER

## 2024-04-26 PROCEDURE — 2500000003 HC RX 250 WO HCPCS: Performed by: INTERNAL MEDICINE

## 2024-04-26 PROCEDURE — 97167 OT EVAL HIGH COMPLEX 60 MIN: CPT

## 2024-04-26 PROCEDURE — 99291 CRITICAL CARE FIRST HOUR: CPT | Performed by: INTERNAL MEDICINE

## 2024-04-26 PROCEDURE — 97163 PT EVAL HIGH COMPLEX 45 MIN: CPT

## 2024-04-26 PROCEDURE — 6370000000 HC RX 637 (ALT 250 FOR IP): Performed by: STUDENT IN AN ORGANIZED HEALTH CARE EDUCATION/TRAINING PROGRAM

## 2024-04-26 PROCEDURE — C9113 INJ PANTOPRAZOLE SODIUM, VIA: HCPCS | Performed by: INTERNAL MEDICINE

## 2024-04-26 RX ADMIN — DOCUSATE SODIUM 100 MG: 50 LIQUID ORAL at 21:11

## 2024-04-26 RX ADMIN — MIDODRINE HYDROCHLORIDE 20 MG: 5 TABLET ORAL at 09:56

## 2024-04-26 RX ADMIN — INSULIN LISPRO 4 UNITS: 100 INJECTION, SOLUTION INTRAVENOUS; SUBCUTANEOUS at 21:09

## 2024-04-26 RX ADMIN — SERTRALINE HYDROCHLORIDE 25 MG: 25 TABLET ORAL at 21:07

## 2024-04-26 RX ADMIN — INSULIN GLARGINE 14 UNITS: 100 INJECTION, SOLUTION SUBCUTANEOUS at 21:08

## 2024-04-26 RX ADMIN — INSULIN LISPRO 3 UNITS: 100 INJECTION, SOLUTION INTRAVENOUS; SUBCUTANEOUS at 21:08

## 2024-04-26 RX ADMIN — HYDROXYZINE HYDROCHLORIDE 25 MG: 25 TABLET ORAL at 21:07

## 2024-04-26 RX ADMIN — MUPIROCIN: 20 OINTMENT TOPICAL at 21:00

## 2024-04-26 RX ADMIN — INSULIN LISPRO 2 UNITS: 100 INJECTION, SOLUTION INTRAVENOUS; SUBCUTANEOUS at 13:22

## 2024-04-26 RX ADMIN — MUPIROCIN: 20 OINTMENT TOPICAL at 09:57

## 2024-04-26 RX ADMIN — SODIUM CHLORIDE, PRESERVATIVE FREE 40 MG: 5 INJECTION INTRAVENOUS at 18:09

## 2024-04-26 RX ADMIN — HYDROCORTISONE SODIUM SUCCINATE 100 MG: 100 INJECTION, POWDER, FOR SOLUTION INTRAMUSCULAR; INTRAVENOUS at 21:08

## 2024-04-26 RX ADMIN — METOCLOPRAMIDE HYDROCHLORIDE 10 MG: 5 INJECTION INTRAMUSCULAR; INTRAVENOUS at 05:43

## 2024-04-26 RX ADMIN — INSULIN LISPRO 4 UNITS: 100 INJECTION, SOLUTION INTRAVENOUS; SUBCUTANEOUS at 10:19

## 2024-04-26 RX ADMIN — MIDODRINE HYDROCHLORIDE 20 MG: 5 TABLET ORAL at 18:08

## 2024-04-26 RX ADMIN — INSULIN LISPRO 3 UNITS: 100 INJECTION, SOLUTION INTRAVENOUS; SUBCUTANEOUS at 18:09

## 2024-04-26 RX ADMIN — HYDROCORTISONE SODIUM SUCCINATE 100 MG: 100 INJECTION, POWDER, FOR SOLUTION INTRAMUSCULAR; INTRAVENOUS at 13:34

## 2024-04-26 RX ADMIN — Medication 6 MCG/MIN: at 23:25

## 2024-04-26 RX ADMIN — Medication 10 ML: at 09:57

## 2024-04-26 RX ADMIN — BUDESONIDE AND FORMOTEROL FUMARATE DIHYDRATE 2 PUFF: 160; 4.5 AEROSOL RESPIRATORY (INHALATION) at 16:13

## 2024-04-26 RX ADMIN — MIDODRINE HYDROCHLORIDE 20 MG: 5 TABLET ORAL at 13:15

## 2024-04-26 RX ADMIN — HYDROCORTISONE SODIUM SUCCINATE 100 MG: 100 INJECTION, POWDER, FOR SOLUTION INTRAMUSCULAR; INTRAVENOUS at 09:56

## 2024-04-26 RX ADMIN — SEVELAMER CARBONATE FOR ORAL SUSPENSION 0.8 G: 800 POWDER, FOR SUSPENSION ORAL at 09:56

## 2024-04-26 RX ADMIN — HYDROCORTISONE SODIUM SUCCINATE 100 MG: 100 INJECTION, POWDER, FOR SOLUTION INTRAMUSCULAR; INTRAVENOUS at 01:31

## 2024-04-26 RX ADMIN — Medication 3 MG: at 21:07

## 2024-04-26 RX ADMIN — CHLORHEXIDINE GLUCONATE, 0.12% ORAL RINSE 15 ML: 1.2 SOLUTION DENTAL at 09:56

## 2024-04-26 RX ADMIN — SEVELAMER CARBONATE FOR ORAL SUSPENSION 0.8 G: 800 POWDER, FOR SUSPENSION ORAL at 21:08

## 2024-04-26 RX ADMIN — ARIPIPRAZOLE 5 MG: 5 TABLET ORAL at 09:56

## 2024-04-26 RX ADMIN — INSULIN LISPRO 2 UNITS: 100 INJECTION, SOLUTION INTRAVENOUS; SUBCUTANEOUS at 10:20

## 2024-04-26 RX ADMIN — INSULIN LISPRO 4 UNITS: 100 INJECTION, SOLUTION INTRAVENOUS; SUBCUTANEOUS at 13:23

## 2024-04-26 RX ADMIN — SODIUM CHLORIDE, PRESERVATIVE FREE 10 ML: 5 INJECTION INTRAVENOUS at 21:10

## 2024-04-26 RX ADMIN — INSULIN LISPRO 4 UNITS: 100 INJECTION, SOLUTION INTRAVENOUS; SUBCUTANEOUS at 18:09

## 2024-04-26 RX ADMIN — Medication 10 ML: at 21:10

## 2024-04-26 RX ADMIN — ATORVASTATIN CALCIUM 40 MG: 40 TABLET, FILM COATED ORAL at 21:07

## 2024-04-26 RX ADMIN — CHLORHEXIDINE GLUCONATE, 0.12% ORAL RINSE 15 ML: 1.2 SOLUTION DENTAL at 21:09

## 2024-04-26 RX ADMIN — POLYETHYLENE GLYCOL 3350 17 G: 17 POWDER, FOR SOLUTION ORAL at 21:10

## 2024-04-26 RX ADMIN — SODIUM CHLORIDE, PRESERVATIVE FREE 10 ML: 5 INJECTION INTRAVENOUS at 09:57

## 2024-04-26 RX ADMIN — HYDROXYZINE HYDROCHLORIDE 25 MG: 25 TABLET ORAL at 09:56

## 2024-04-26 RX ADMIN — METOCLOPRAMIDE HYDROCHLORIDE 10 MG: 5 INJECTION INTRAMUSCULAR; INTRAVENOUS at 11:29

## 2024-04-26 RX ADMIN — MEROPENEM 500 MG: 500 INJECTION, POWDER, FOR SOLUTION INTRAVENOUS at 18:12

## 2024-04-26 RX ADMIN — SODIUM CHLORIDE, PRESERVATIVE FREE 40 MG: 5 INJECTION INTRAVENOUS at 03:48

## 2024-04-26 RX ADMIN — METOCLOPRAMIDE HYDROCHLORIDE 10 MG: 5 INJECTION INTRAMUSCULAR; INTRAVENOUS at 18:09

## 2024-04-26 RX ADMIN — METOCLOPRAMIDE HYDROCHLORIDE 10 MG: 5 INJECTION INTRAMUSCULAR; INTRAVENOUS at 23:35

## 2024-04-26 RX ADMIN — SEVELAMER CARBONATE FOR ORAL SUSPENSION 0.8 G: 800 POWDER, FOR SUSPENSION ORAL at 13:36

## 2024-04-26 RX ADMIN — MODAFINIL 100 MG: 200 TABLET ORAL at 09:56

## 2024-04-26 ASSESSMENT — PAIN SCALES - GENERAL: PAINLEVEL_OUTOF10: 0

## 2024-04-26 NOTE — INTERDISCIPLINARY ROUNDS
Spiritual Care Services     Summary of Visit:   participated in ICU rounds. No immediate spiritual needs noted and no family present.    Encounter Summary  Encounter Overview/Reason: Interdisciplinary rounds  Service Provided For: Patient  Referral/Consult From: Rounding  Support System: Children, Family members  Last Encounter : 04/22/24  Complexity of Encounter: Moderate  Begin Time: 1000  End Time : 1020  Total Time Calculated: 20 min        Spiritual/Emotional needs  Type: Spiritual Support     Grief, Loss, and Adjustments  Type: Adjustment to illness                Spiritual Assessment/Intervention/Outcomes:    Assessment: Compromised coping    Intervention: Prayer (assurance of)/[place], Sustaining Presence/Ministry of presence    Outcome: Receptive      Care Plan:    Plan and Referrals  Plan/Referrals: Continue Support (comment)     to provide support as needed or requested      Spiritual Care Services   Electronically signed by Chaplain Pearl on 4/26/2024 at 10:49 AM.    To reach a  for emotional and spiritual support, place an EPIC consult request.   If a  is needed immediately, dial “0” and ask to page the on-call .

## 2024-04-27 LAB
ALBUMIN SERPL-MCNC: 4 G/DL (ref 3.5–4.6)
ANION GAP SERPL CALCULATED.3IONS-SCNC: 20 MEQ/L (ref 9–15)
APTT PPP: 29.1 SEC (ref 24.4–36.8)
BASOPHILS # BLD: 0 K/UL (ref 0–0.2)
BASOPHILS NFR BLD: 0 %
BUN SERPL-MCNC: 67 MG/DL (ref 8–23)
CALCIUM SERPL-MCNC: 9 MG/DL (ref 8.5–9.9)
CHLORIDE SERPL-SCNC: 91 MEQ/L (ref 95–107)
CO2 SERPL-SCNC: 23 MEQ/L (ref 20–31)
CREAT SERPL-MCNC: 4.93 MG/DL (ref 0.5–0.9)
EOSINOPHIL # BLD: 0 K/UL (ref 0–0.7)
EOSINOPHIL NFR BLD: 0 %
ERYTHROCYTE [DISTWIDTH] IN BLOOD BY AUTOMATED COUNT: 19.3 % (ref 11.5–14.5)
FIBRINOGEN PPP-MCNC: 162 MG/DL (ref 262–562)
GLUCOSE BLD-MCNC: 257 MG/DL (ref 70–99)
GLUCOSE BLD-MCNC: 258 MG/DL (ref 70–99)
GLUCOSE BLD-MCNC: 258 MG/DL (ref 70–99)
GLUCOSE BLD-MCNC: 297 MG/DL (ref 70–99)
GLUCOSE BLD-MCNC: 426 MG/DL (ref 70–99)
GLUCOSE SERPL-MCNC: 384 MG/DL (ref 70–99)
HCT VFR BLD AUTO: 32 % (ref 37–47)
HGB BLD-MCNC: 10.4 G/DL (ref 12–16)
INR PPP: 1.5
LYMPHOCYTES # BLD: 0.3 K/UL (ref 1–4.8)
LYMPHOCYTES NFR BLD: 1.5 %
MCH RBC QN AUTO: 33.8 PG (ref 27–31.3)
MCHC RBC AUTO-ENTMCNC: 32.5 % (ref 33–37)
MCV RBC AUTO: 103.9 FL (ref 79.4–94.8)
MONOCYTES # BLD: 0.7 K/UL (ref 0.2–0.8)
MONOCYTES NFR BLD: 3.5 %
NEUTROPHILS # BLD: 18.9 K/UL (ref 1.4–6.5)
NEUTS SEG NFR BLD: 94.2 %
PERFORMED ON: ABNORMAL
PHOSPHATE SERPL-MCNC: 7.8 MG/DL (ref 2.3–4.8)
PLATELET # BLD AUTO: 86 K/UL (ref 130–400)
POTASSIUM SERPL-SCNC: 4.5 MEQ/L (ref 3.4–4.9)
PROTHROMBIN TIME: 18.1 SEC (ref 12.3–14.9)
RBC # BLD AUTO: 3.08 M/UL (ref 4.2–5.4)
SODIUM SERPL-SCNC: 134 MEQ/L (ref 135–144)
WBC # BLD AUTO: 20.1 K/UL (ref 4.8–10.8)

## 2024-04-27 PROCEDURE — 6370000000 HC RX 637 (ALT 250 FOR IP): Performed by: NURSE PRACTITIONER

## 2024-04-27 PROCEDURE — 99291 CRITICAL CARE FIRST HOUR: CPT | Performed by: INTERNAL MEDICINE

## 2024-04-27 PROCEDURE — 6360000002 HC RX W HCPCS: Performed by: INTERNAL MEDICINE

## 2024-04-27 PROCEDURE — 6370000000 HC RX 637 (ALT 250 FOR IP): Performed by: INTERNAL MEDICINE

## 2024-04-27 PROCEDURE — P9047 ALBUMIN (HUMAN), 25%, 50ML: HCPCS | Performed by: INTERNAL MEDICINE

## 2024-04-27 PROCEDURE — 85025 COMPLETE CBC W/AUTO DIFF WBC: CPT

## 2024-04-27 PROCEDURE — A4216 STERILE WATER/SALINE, 10 ML: HCPCS | Performed by: INTERNAL MEDICINE

## 2024-04-27 PROCEDURE — 94640 AIRWAY INHALATION TREATMENT: CPT

## 2024-04-27 PROCEDURE — 36592 COLLECT BLOOD FROM PICC: CPT

## 2024-04-27 PROCEDURE — C9113 INJ PANTOPRAZOLE SODIUM, VIA: HCPCS | Performed by: INTERNAL MEDICINE

## 2024-04-27 PROCEDURE — 85730 THROMBOPLASTIN TIME PARTIAL: CPT

## 2024-04-27 PROCEDURE — 2580000003 HC RX 258: Performed by: STUDENT IN AN ORGANIZED HEALTH CARE EDUCATION/TRAINING PROGRAM

## 2024-04-27 PROCEDURE — 94761 N-INVAS EAR/PLS OXIMETRY MLT: CPT

## 2024-04-27 PROCEDURE — 6370000000 HC RX 637 (ALT 250 FOR IP): Performed by: STUDENT IN AN ORGANIZED HEALTH CARE EDUCATION/TRAINING PROGRAM

## 2024-04-27 PROCEDURE — 2000000000 HC ICU R&B

## 2024-04-27 PROCEDURE — 2580000003 HC RX 258: Performed by: INTERNAL MEDICINE

## 2024-04-27 PROCEDURE — 80069 RENAL FUNCTION PANEL: CPT

## 2024-04-27 PROCEDURE — 85384 FIBRINOGEN ACTIVITY: CPT

## 2024-04-27 PROCEDURE — 2700000000 HC OXYGEN THERAPY PER DAY

## 2024-04-27 PROCEDURE — 90935 HEMODIALYSIS ONE EVALUATION: CPT

## 2024-04-27 PROCEDURE — 85610 PROTHROMBIN TIME: CPT

## 2024-04-27 RX ORDER — INSULIN LISPRO 100 [IU]/ML
0-4 INJECTION, SOLUTION INTRAVENOUS; SUBCUTANEOUS EVERY 4 HOURS
Status: DISCONTINUED | OUTPATIENT
Start: 2024-04-27 | End: 2024-04-27

## 2024-04-27 RX ORDER — INSULIN GLARGINE 100 [IU]/ML
30 INJECTION, SOLUTION SUBCUTANEOUS NIGHTLY
Status: DISCONTINUED | OUTPATIENT
Start: 2024-04-27 | End: 2024-04-28

## 2024-04-27 RX ORDER — FENTANYL CITRATE 50 UG/ML
INJECTION, SOLUTION INTRAMUSCULAR; INTRAVENOUS
Status: DISCONTINUED
Start: 2024-04-27 | End: 2024-04-27 | Stop reason: WASHOUT

## 2024-04-27 RX ORDER — INSULIN LISPRO 100 [IU]/ML
0-8 INJECTION, SOLUTION INTRAVENOUS; SUBCUTANEOUS 4 TIMES DAILY
Status: DISCONTINUED | OUTPATIENT
Start: 2024-04-27 | End: 2024-04-29

## 2024-04-27 RX ORDER — INSULIN LISPRO 100 [IU]/ML
4 INJECTION, SOLUTION INTRAVENOUS; SUBCUTANEOUS 4 TIMES DAILY
Status: DISCONTINUED | OUTPATIENT
Start: 2024-04-27 | End: 2024-04-28

## 2024-04-27 RX ADMIN — SODIUM CHLORIDE, PRESERVATIVE FREE 10 ML: 5 INJECTION INTRAVENOUS at 21:19

## 2024-04-27 RX ADMIN — CHLORHEXIDINE GLUCONATE, 0.12% ORAL RINSE 15 ML: 1.2 SOLUTION DENTAL at 21:14

## 2024-04-27 RX ADMIN — INSULIN LISPRO 4 UNITS: 100 INJECTION, SOLUTION INTRAVENOUS; SUBCUTANEOUS at 21:16

## 2024-04-27 RX ADMIN — ALBUMIN (HUMAN) 25 G: 0.25 INJECTION, SOLUTION INTRAVENOUS at 10:03

## 2024-04-27 RX ADMIN — CHLORHEXIDINE GLUCONATE, 0.12% ORAL RINSE 15 ML: 1.2 SOLUTION DENTAL at 09:54

## 2024-04-27 RX ADMIN — SERTRALINE HYDROCHLORIDE 25 MG: 25 TABLET ORAL at 21:15

## 2024-04-27 RX ADMIN — POLYETHYLENE GLYCOL 3350 17 G: 17 POWDER, FOR SOLUTION ORAL at 21:17

## 2024-04-27 RX ADMIN — METOCLOPRAMIDE HYDROCHLORIDE 10 MG: 5 INJECTION INTRAMUSCULAR; INTRAVENOUS at 18:21

## 2024-04-27 RX ADMIN — HYDROXYZINE HYDROCHLORIDE 25 MG: 25 TABLET ORAL at 09:53

## 2024-04-27 RX ADMIN — HYDROCORTISONE SODIUM SUCCINATE 25 MG: 100 INJECTION, POWDER, FOR SOLUTION INTRAMUSCULAR; INTRAVENOUS at 18:13

## 2024-04-27 RX ADMIN — HYDROXYZINE HYDROCHLORIDE 25 MG: 25 TABLET ORAL at 21:15

## 2024-04-27 RX ADMIN — INSULIN LISPRO 4 UNITS: 100 INJECTION, SOLUTION INTRAVENOUS; SUBCUTANEOUS at 09:15

## 2024-04-27 RX ADMIN — SODIUM CHLORIDE, PRESERVATIVE FREE 10 ML: 5 INJECTION INTRAVENOUS at 10:14

## 2024-04-27 RX ADMIN — MODAFINIL 100 MG: 200 TABLET ORAL at 09:53

## 2024-04-27 RX ADMIN — INSULIN GLARGINE 30 UNITS: 100 INJECTION, SOLUTION SUBCUTANEOUS at 21:15

## 2024-04-27 RX ADMIN — MUPIROCIN: 20 OINTMENT TOPICAL at 09:54

## 2024-04-27 RX ADMIN — METOCLOPRAMIDE HYDROCHLORIDE 10 MG: 5 INJECTION INTRAMUSCULAR; INTRAVENOUS at 05:02

## 2024-04-27 RX ADMIN — ATORVASTATIN CALCIUM 40 MG: 40 TABLET, FILM COATED ORAL at 21:14

## 2024-04-27 RX ADMIN — TIOTROPIUM BROMIDE INHALATION SPRAY 2 PUFF: 3.12 SPRAY, METERED RESPIRATORY (INHALATION) at 04:39

## 2024-04-27 RX ADMIN — INSULIN LISPRO 4 UNITS: 100 INJECTION, SOLUTION INTRAVENOUS; SUBCUTANEOUS at 13:39

## 2024-04-27 RX ADMIN — DOCUSATE SODIUM 100 MG: 50 LIQUID ORAL at 21:15

## 2024-04-27 RX ADMIN — INSULIN LISPRO 2 UNITS: 100 INJECTION, SOLUTION INTRAVENOUS; SUBCUTANEOUS at 03:27

## 2024-04-27 RX ADMIN — HYDROCORTISONE SODIUM SUCCINATE 100 MG: 100 INJECTION, POWDER, FOR SOLUTION INTRAMUSCULAR; INTRAVENOUS at 08:00

## 2024-04-27 RX ADMIN — ARIPIPRAZOLE 5 MG: 5 TABLET ORAL at 09:53

## 2024-04-27 RX ADMIN — MIDODRINE HYDROCHLORIDE 20 MG: 5 TABLET ORAL at 18:13

## 2024-04-27 RX ADMIN — SODIUM CHLORIDE, PRESERVATIVE FREE 40 MG: 5 INJECTION INTRAVENOUS at 03:28

## 2024-04-27 RX ADMIN — SEVELAMER CARBONATE FOR ORAL SUSPENSION 0.8 G: 800 POWDER, FOR SUSPENSION ORAL at 09:53

## 2024-04-27 RX ADMIN — MIDODRINE HYDROCHLORIDE 20 MG: 5 TABLET ORAL at 09:53

## 2024-04-27 RX ADMIN — MIDODRINE HYDROCHLORIDE 20 MG: 5 TABLET ORAL at 13:40

## 2024-04-27 RX ADMIN — MUPIROCIN: 20 OINTMENT TOPICAL at 21:18

## 2024-04-27 RX ADMIN — INSULIN LISPRO 4 UNITS: 100 INJECTION, SOLUTION INTRAVENOUS; SUBCUTANEOUS at 18:21

## 2024-04-27 RX ADMIN — BUDESONIDE AND FORMOTEROL FUMARATE DIHYDRATE 2 PUFF: 160; 4.5 AEROSOL RESPIRATORY (INHALATION) at 04:39

## 2024-04-27 RX ADMIN — Medication 10 ML: at 21:20

## 2024-04-27 RX ADMIN — SEVELAMER CARBONATE FOR ORAL SUSPENSION 0.8 G: 800 POWDER, FOR SUSPENSION ORAL at 21:15

## 2024-04-27 RX ADMIN — METOCLOPRAMIDE HYDROCHLORIDE 10 MG: 5 INJECTION INTRAMUSCULAR; INTRAVENOUS at 13:40

## 2024-04-27 RX ADMIN — BUDESONIDE AND FORMOTEROL FUMARATE DIHYDRATE 2 PUFF: 160; 4.5 AEROSOL RESPIRATORY (INHALATION) at 16:12

## 2024-04-27 RX ADMIN — MEROPENEM 500 MG: 500 INJECTION, POWDER, FOR SOLUTION INTRAVENOUS at 18:40

## 2024-04-27 RX ADMIN — HYDROCORTISONE SODIUM SUCCINATE 100 MG: 100 INJECTION, POWDER, FOR SOLUTION INTRAMUSCULAR; INTRAVENOUS at 02:00

## 2024-04-27 RX ADMIN — SEVELAMER CARBONATE FOR ORAL SUSPENSION 0.8 G: 800 POWDER, FOR SUSPENSION ORAL at 13:40

## 2024-04-27 RX ADMIN — SODIUM CHLORIDE, PRESERVATIVE FREE 40 MG: 5 INJECTION INTRAVENOUS at 18:21

## 2024-04-27 ASSESSMENT — PAIN SCALES - GENERAL
PAINLEVEL_OUTOF10: 0

## 2024-04-27 NOTE — FLOWSHEET NOTE
Patient lethargic. Patient wakes up to voice command. Patient alert to self and place. Occasionally follows commands.   Patient to have HD this morning.   Patient BS elevated. 426. Messaged endocrinology. Aware. Will look at orders and change medication coverage.  HD arrived and started by 0900.   Daughter called and updated at 0945. Aware of HD. Patient resting.   Medications passed.   Post administration of provigil, patient on call bell often. Talking and chatting.   HD finished around 1300. 3 L off. 1 dose of albumin given.   Patient still on levophed gtt. Unable to wean.   BS came down for lunchtime.   Patient resting.

## 2024-04-28 LAB
ALBUMIN SERPL-MCNC: 4.3 G/DL (ref 3.5–4.6)
ANION GAP SERPL CALCULATED.3IONS-SCNC: 15 MEQ/L (ref 9–15)
APTT PPP: 28.3 SEC (ref 24.4–36.8)
BASOPHILS # BLD: 0 K/UL (ref 0–0.2)
BASOPHILS NFR BLD: 0.1 %
BUN SERPL-MCNC: 49 MG/DL (ref 8–23)
CALCIUM SERPL-MCNC: 9.9 MG/DL (ref 8.5–9.9)
CHLORIDE SERPL-SCNC: 93 MEQ/L (ref 95–107)
CO2 SERPL-SCNC: 29 MEQ/L (ref 20–31)
CREAT SERPL-MCNC: 3.67 MG/DL (ref 0.5–0.9)
EOSINOPHIL # BLD: 0 K/UL (ref 0–0.7)
EOSINOPHIL NFR BLD: 0 %
ERYTHROCYTE [DISTWIDTH] IN BLOOD BY AUTOMATED COUNT: 18.7 % (ref 11.5–14.5)
FIBRINOGEN PPP-MCNC: 146 MG/DL (ref 262–562)
GLUCOSE BLD-MCNC: 246 MG/DL (ref 70–99)
GLUCOSE BLD-MCNC: 294 MG/DL (ref 70–99)
GLUCOSE BLD-MCNC: 307 MG/DL (ref 70–99)
GLUCOSE BLD-MCNC: 346 MG/DL (ref 70–99)
GLUCOSE BLD-MCNC: 349 MG/DL (ref 70–99)
GLUCOSE SERPL-MCNC: 295 MG/DL (ref 70–99)
HCT VFR BLD AUTO: 31 % (ref 37–47)
HGB BLD-MCNC: 10.2 G/DL (ref 12–16)
INR PPP: 1.4
LYMPHOCYTES # BLD: 0.2 K/UL (ref 1–4.8)
LYMPHOCYTES NFR BLD: 1 %
MCH RBC QN AUTO: 34 PG (ref 27–31.3)
MCHC RBC AUTO-ENTMCNC: 32.9 % (ref 33–37)
MCV RBC AUTO: 103.3 FL (ref 79.4–94.8)
MONOCYTES # BLD: 0.2 K/UL (ref 0.2–0.8)
MONOCYTES NFR BLD: 1 %
NEUTROPHILS # BLD: 21.8 K/UL (ref 1.4–6.5)
NEUTS SEG NFR BLD: 98 %
PERFORMED ON: ABNORMAL
PHOSPHATE SERPL-MCNC: 4.9 MG/DL (ref 2.3–4.8)
PLATELET # BLD AUTO: 75 K/UL (ref 130–400)
PLATELET BLD QL SMEAR: ABNORMAL
POTASSIUM SERPL-SCNC: 4 MEQ/L (ref 3.4–4.9)
PROTHROMBIN TIME: 16.9 SEC (ref 12.3–14.9)
RBC # BLD AUTO: 3 M/UL (ref 4.2–5.4)
SLIDE REVIEW: ABNORMAL
SODIUM SERPL-SCNC: 137 MEQ/L (ref 135–144)
WBC # BLD AUTO: 22.2 K/UL (ref 4.8–10.8)

## 2024-04-28 PROCEDURE — 85610 PROTHROMBIN TIME: CPT

## 2024-04-28 PROCEDURE — 36592 COLLECT BLOOD FROM PICC: CPT

## 2024-04-28 PROCEDURE — 6360000002 HC RX W HCPCS: Performed by: INTERNAL MEDICINE

## 2024-04-28 PROCEDURE — 6370000000 HC RX 637 (ALT 250 FOR IP): Performed by: STUDENT IN AN ORGANIZED HEALTH CARE EDUCATION/TRAINING PROGRAM

## 2024-04-28 PROCEDURE — 6370000000 HC RX 637 (ALT 250 FOR IP): Performed by: INTERNAL MEDICINE

## 2024-04-28 PROCEDURE — 80069 RENAL FUNCTION PANEL: CPT

## 2024-04-28 PROCEDURE — 85384 FIBRINOGEN ACTIVITY: CPT

## 2024-04-28 PROCEDURE — 99291 CRITICAL CARE FIRST HOUR: CPT | Performed by: INTERNAL MEDICINE

## 2024-04-28 PROCEDURE — 2580000003 HC RX 258: Performed by: INTERNAL MEDICINE

## 2024-04-28 PROCEDURE — 85730 THROMBOPLASTIN TIME PARTIAL: CPT

## 2024-04-28 PROCEDURE — A4216 STERILE WATER/SALINE, 10 ML: HCPCS | Performed by: INTERNAL MEDICINE

## 2024-04-28 PROCEDURE — 99232 SBSQ HOSP IP/OBS MODERATE 35: CPT | Performed by: INTERNAL MEDICINE

## 2024-04-28 PROCEDURE — 94761 N-INVAS EAR/PLS OXIMETRY MLT: CPT

## 2024-04-28 PROCEDURE — 6370000000 HC RX 637 (ALT 250 FOR IP)

## 2024-04-28 PROCEDURE — C9113 INJ PANTOPRAZOLE SODIUM, VIA: HCPCS | Performed by: INTERNAL MEDICINE

## 2024-04-28 PROCEDURE — 6370000000 HC RX 637 (ALT 250 FOR IP): Performed by: NURSE PRACTITIONER

## 2024-04-28 PROCEDURE — 2700000000 HC OXYGEN THERAPY PER DAY

## 2024-04-28 PROCEDURE — 2000000000 HC ICU R&B

## 2024-04-28 PROCEDURE — 37799 UNLISTED PX VASCULAR SURGERY: CPT

## 2024-04-28 PROCEDURE — 2580000003 HC RX 258: Performed by: STUDENT IN AN ORGANIZED HEALTH CARE EDUCATION/TRAINING PROGRAM

## 2024-04-28 PROCEDURE — 36620 INSERTION CATHETER ARTERY: CPT

## 2024-04-28 PROCEDURE — 94640 AIRWAY INHALATION TREATMENT: CPT

## 2024-04-28 PROCEDURE — 85025 COMPLETE CBC W/AUTO DIFF WBC: CPT

## 2024-04-28 PROCEDURE — 2500000003 HC RX 250 WO HCPCS: Performed by: INTERNAL MEDICINE

## 2024-04-28 PROCEDURE — 6370000000 HC RX 637 (ALT 250 FOR IP): Performed by: PHYSICIAN ASSISTANT

## 2024-04-28 PROCEDURE — 99232 SBSQ HOSP IP/OBS MODERATE 35: CPT | Performed by: PHYSICIAN ASSISTANT

## 2024-04-28 RX ORDER — INSULIN GLARGINE 100 [IU]/ML
36 INJECTION, SOLUTION SUBCUTANEOUS NIGHTLY
Status: DISCONTINUED | OUTPATIENT
Start: 2024-04-28 | End: 2024-04-29

## 2024-04-28 RX ORDER — INSULIN LISPRO 100 [IU]/ML
6 INJECTION, SOLUTION INTRAVENOUS; SUBCUTANEOUS 4 TIMES DAILY
Status: DISCONTINUED | OUTPATIENT
Start: 2024-04-28 | End: 2024-04-29

## 2024-04-28 RX ADMIN — HYDROCORTISONE SODIUM SUCCINATE 25 MG: 100 INJECTION, POWDER, FOR SOLUTION INTRAMUSCULAR; INTRAVENOUS at 08:23

## 2024-04-28 RX ADMIN — INSULIN LISPRO 6 UNITS: 100 INJECTION, SOLUTION INTRAVENOUS; SUBCUTANEOUS at 17:03

## 2024-04-28 RX ADMIN — POLYETHYLENE GLYCOL 3350 17 G: 17 POWDER, FOR SOLUTION ORAL at 08:23

## 2024-04-28 RX ADMIN — METOCLOPRAMIDE HYDROCHLORIDE 10 MG: 5 INJECTION INTRAMUSCULAR; INTRAVENOUS at 22:07

## 2024-04-28 RX ADMIN — INSULIN LISPRO 2 UNITS: 100 INJECTION, SOLUTION INTRAVENOUS; SUBCUTANEOUS at 02:54

## 2024-04-28 RX ADMIN — INSULIN LISPRO 4 UNITS: 100 INJECTION, SOLUTION INTRAVENOUS; SUBCUTANEOUS at 05:13

## 2024-04-28 RX ADMIN — SERTRALINE HYDROCHLORIDE 25 MG: 25 TABLET ORAL at 22:07

## 2024-04-28 RX ADMIN — INSULIN LISPRO 6 UNITS: 100 INJECTION, SOLUTION INTRAVENOUS; SUBCUTANEOUS at 13:12

## 2024-04-28 RX ADMIN — BUDESONIDE AND FORMOTEROL FUMARATE DIHYDRATE 2 PUFF: 160; 4.5 AEROSOL RESPIRATORY (INHALATION) at 15:31

## 2024-04-28 RX ADMIN — INSULIN LISPRO 6 UNITS: 100 INJECTION, SOLUTION INTRAVENOUS; SUBCUTANEOUS at 22:06

## 2024-04-28 RX ADMIN — INSULIN LISPRO 6 UNITS: 100 INJECTION, SOLUTION INTRAVENOUS; SUBCUTANEOUS at 22:05

## 2024-04-28 RX ADMIN — HYDROXYZINE HYDROCHLORIDE 25 MG: 25 TABLET ORAL at 22:07

## 2024-04-28 RX ADMIN — Medication 10 ML: at 22:06

## 2024-04-28 RX ADMIN — DOCUSATE SODIUM 100 MG: 50 LIQUID ORAL at 08:22

## 2024-04-28 RX ADMIN — SODIUM CHLORIDE, PRESERVATIVE FREE 40 MG: 5 INJECTION INTRAVENOUS at 05:14

## 2024-04-28 RX ADMIN — Medication 10 ML: at 08:24

## 2024-04-28 RX ADMIN — DOCUSATE SODIUM 100 MG: 50 LIQUID ORAL at 22:07

## 2024-04-28 RX ADMIN — MODAFINIL 100 MG: 200 TABLET ORAL at 08:23

## 2024-04-28 RX ADMIN — INSULIN GLARGINE 36 UNITS: 100 INJECTION, SOLUTION SUBCUTANEOUS at 21:57

## 2024-04-28 RX ADMIN — SODIUM CHLORIDE, PRESERVATIVE FREE 10 ML: 5 INJECTION INTRAVENOUS at 08:24

## 2024-04-28 RX ADMIN — METOCLOPRAMIDE HYDROCHLORIDE 10 MG: 5 INJECTION INTRAMUSCULAR; INTRAVENOUS at 16:55

## 2024-04-28 RX ADMIN — MUPIROCIN: 20 OINTMENT TOPICAL at 11:09

## 2024-04-28 RX ADMIN — SODIUM CHLORIDE, PRESERVATIVE FREE 40 MG: 5 INJECTION INTRAVENOUS at 16:46

## 2024-04-28 RX ADMIN — BUDESONIDE AND FORMOTEROL FUMARATE DIHYDRATE 2 PUFF: 160; 4.5 AEROSOL RESPIRATORY (INHALATION) at 03:19

## 2024-04-28 RX ADMIN — MUPIROCIN: 20 OINTMENT TOPICAL at 22:28

## 2024-04-28 RX ADMIN — MIDODRINE HYDROCHLORIDE 20 MG: 5 TABLET ORAL at 10:54

## 2024-04-28 RX ADMIN — HYDROXYZINE HYDROCHLORIDE 25 MG: 25 TABLET ORAL at 08:23

## 2024-04-28 RX ADMIN — SODIUM CHLORIDE, PRESERVATIVE FREE 10 ML: 5 INJECTION INTRAVENOUS at 22:06

## 2024-04-28 RX ADMIN — SEVELAMER CARBONATE FOR ORAL SUSPENSION 0.8 G: 800 POWDER, FOR SUSPENSION ORAL at 16:55

## 2024-04-28 RX ADMIN — CHLORHEXIDINE GLUCONATE, 0.12% ORAL RINSE 15 ML: 1.2 SOLUTION DENTAL at 22:07

## 2024-04-28 RX ADMIN — MIDODRINE HYDROCHLORIDE 20 MG: 5 TABLET ORAL at 05:11

## 2024-04-28 RX ADMIN — METOCLOPRAMIDE HYDROCHLORIDE 10 MG: 5 INJECTION INTRAMUSCULAR; INTRAVENOUS at 05:14

## 2024-04-28 RX ADMIN — HYDROCORTISONE SODIUM SUCCINATE 25 MG: 100 INJECTION, POWDER, FOR SOLUTION INTRAMUSCULAR; INTRAVENOUS at 01:21

## 2024-04-28 RX ADMIN — SEVELAMER CARBONATE FOR ORAL SUSPENSION 0.8 G: 800 POWDER, FOR SUSPENSION ORAL at 08:23

## 2024-04-28 RX ADMIN — MIDODRINE HYDROCHLORIDE 20 MG: 5 TABLET ORAL at 16:54

## 2024-04-28 RX ADMIN — ATORVASTATIN CALCIUM 40 MG: 40 TABLET, FILM COATED ORAL at 22:07

## 2024-04-28 RX ADMIN — METOCLOPRAMIDE HYDROCHLORIDE 10 MG: 5 INJECTION INTRAMUSCULAR; INTRAVENOUS at 10:55

## 2024-04-28 RX ADMIN — POLYETHYLENE GLYCOL 3350 17 G: 17 POWDER, FOR SOLUTION ORAL at 22:07

## 2024-04-28 RX ADMIN — TIOTROPIUM BROMIDE INHALATION SPRAY 2 PUFF: 3.12 SPRAY, METERED RESPIRATORY (INHALATION) at 03:19

## 2024-04-28 RX ADMIN — ARIPIPRAZOLE 5 MG: 5 TABLET ORAL at 08:25

## 2024-04-28 RX ADMIN — METOCLOPRAMIDE HYDROCHLORIDE 10 MG: 5 INJECTION INTRAMUSCULAR; INTRAVENOUS at 01:21

## 2024-04-28 RX ADMIN — SEVELAMER CARBONATE FOR ORAL SUSPENSION 0.8 G: 800 POWDER, FOR SUSPENSION ORAL at 22:07

## 2024-04-28 RX ADMIN — HYDROCORTISONE SODIUM SUCCINATE 25 MG: 100 INJECTION, POWDER, FOR SOLUTION INTRAMUSCULAR; INTRAVENOUS at 16:55

## 2024-04-28 RX ADMIN — CHLORHEXIDINE GLUCONATE, 0.12% ORAL RINSE 15 ML: 1.2 SOLUTION DENTAL at 08:25

## 2024-04-28 RX ADMIN — Medication 4 MCG/MIN: at 16:45

## 2024-04-28 RX ADMIN — INSULIN LISPRO 4 UNITS: 100 INJECTION, SOLUTION INTRAVENOUS; SUBCUTANEOUS at 08:22

## 2024-04-28 RX ADMIN — INSULIN LISPRO 4 UNITS: 100 INJECTION, SOLUTION INTRAVENOUS; SUBCUTANEOUS at 08:23

## 2024-04-28 RX ADMIN — MEROPENEM 500 MG: 500 INJECTION, POWDER, FOR SOLUTION INTRAVENOUS at 16:53

## 2024-04-28 ASSESSMENT — PAIN SCALES - GENERAL
PAINLEVEL_OUTOF10: 0

## 2024-04-28 ASSESSMENT — PAIN DESCRIPTION - DESCRIPTORS
DESCRIPTORS: ACHING
DESCRIPTORS: ACHING

## 2024-04-28 ASSESSMENT — PAIN DESCRIPTION - LOCATION
LOCATION: CHEST
LOCATION: CHEST

## 2024-04-28 ASSESSMENT — PAIN DESCRIPTION - ORIENTATION
ORIENTATION: MID
ORIENTATION: MID

## 2024-04-29 PROBLEM — D69.6 THROMBOCYTOPENIA (HCC): Status: ACTIVE | Noted: 2024-04-29

## 2024-04-29 LAB
ALBUMIN SERPL-MCNC: 4 G/DL (ref 3.5–4.6)
ANION GAP SERPL CALCULATED.3IONS-SCNC: 18 MEQ/L (ref 9–15)
ANISOCYTOSIS BLD QL SMEAR: ABNORMAL
APTT PPP: 29.2 SEC (ref 24.4–36.8)
BASOPHILS # BLD: 0 K/UL (ref 0–0.2)
BASOPHILS NFR BLD: 0.1 %
BUN SERPL-MCNC: 68 MG/DL (ref 8–23)
BURR CELLS BLD QL SMEAR: ABNORMAL
CALCIUM SERPL-MCNC: 9.8 MG/DL (ref 8.5–9.9)
CHLORIDE SERPL-SCNC: 91 MEQ/L (ref 95–107)
CO2 SERPL-SCNC: 25 MEQ/L (ref 20–31)
CREAT SERPL-MCNC: 4.55 MG/DL (ref 0.5–0.9)
EOSINOPHIL # BLD: 0 K/UL (ref 0–0.7)
EOSINOPHIL NFR BLD: 0 %
ERYTHROCYTE [DISTWIDTH] IN BLOOD BY AUTOMATED COUNT: 18.2 % (ref 11.5–14.5)
FIBRINOGEN PPP-MCNC: 175 MG/DL (ref 262–562)
GLUCOSE BLD-MCNC: 324 MG/DL (ref 70–99)
GLUCOSE BLD-MCNC: 370 MG/DL (ref 70–99)
GLUCOSE BLD-MCNC: 375 MG/DL (ref 70–99)
GLUCOSE BLD-MCNC: 444 MG/DL (ref 70–99)
GLUCOSE BLD-MCNC: 445 MG/DL (ref 70–99)
GLUCOSE SERPL-MCNC: 382 MG/DL (ref 70–99)
HCT VFR BLD AUTO: 29.6 % (ref 37–47)
HCT VFR BLD AUTO: 29.7 % (ref 37–47)
HGB BLD-MCNC: 9.8 G/DL (ref 12–16)
HGB BLD-MCNC: 9.8 G/DL (ref 12–16)
INR PPP: 1.3
LYMPHOCYTES # BLD: 0 K/UL (ref 1–4.8)
LYMPHOCYTES NFR BLD: 1.6 %
MACROCYTES BLD QL SMEAR: ABNORMAL
MCH RBC QN AUTO: 34.4 PG (ref 27–31.3)
MCHC RBC AUTO-ENTMCNC: 33 % (ref 33–37)
MCV RBC AUTO: 104.2 FL (ref 79.4–94.8)
MONOCYTES # BLD: 0.2 K/UL (ref 0.2–0.8)
MONOCYTES NFR BLD: 1 %
NEUTROPHILS # BLD: 21.4 K/UL (ref 1.4–6.5)
NEUTS SEG NFR BLD: 99 %
OVALOCYTES BLD QL SMEAR: ABNORMAL
PERFORMED ON: ABNORMAL
PHOSPHATE SERPL-MCNC: 4.6 MG/DL (ref 2.3–4.8)
PLATELET # BLD AUTO: 65 K/UL (ref 130–400)
PLATELET BLD QL SMEAR: ABNORMAL
POIKILOCYTOSIS BLD QL SMEAR: ABNORMAL
POTASSIUM SERPL-SCNC: 4.8 MEQ/L (ref 3.4–4.9)
PROTHROMBIN TIME: 16.4 SEC (ref 12.3–14.9)
RBC # BLD AUTO: 2.85 M/UL (ref 4.2–5.4)
SLIDE REVIEW: ABNORMAL
SMUDGE CELLS BLD QL SMEAR: 4.8
SODIUM SERPL-SCNC: 134 MEQ/L (ref 135–144)
WBC # BLD AUTO: 21.6 K/UL (ref 4.8–10.8)

## 2024-04-29 PROCEDURE — 92526 ORAL FUNCTION THERAPY: CPT

## 2024-04-29 PROCEDURE — 6370000000 HC RX 637 (ALT 250 FOR IP): Performed by: INTERNAL MEDICINE

## 2024-04-29 PROCEDURE — 6360000002 HC RX W HCPCS: Performed by: INTERNAL MEDICINE

## 2024-04-29 PROCEDURE — 2580000003 HC RX 258: Performed by: STUDENT IN AN ORGANIZED HEALTH CARE EDUCATION/TRAINING PROGRAM

## 2024-04-29 PROCEDURE — 36592 COLLECT BLOOD FROM PICC: CPT

## 2024-04-29 PROCEDURE — 85384 FIBRINOGEN ACTIVITY: CPT

## 2024-04-29 PROCEDURE — 6370000000 HC RX 637 (ALT 250 FOR IP): Performed by: PHYSICIAN ASSISTANT

## 2024-04-29 PROCEDURE — 2580000003 HC RX 258: Performed by: INTERNAL MEDICINE

## 2024-04-29 PROCEDURE — 2000000000 HC ICU R&B

## 2024-04-29 PROCEDURE — 99233 SBSQ HOSP IP/OBS HIGH 50: CPT | Performed by: INTERNAL MEDICINE

## 2024-04-29 PROCEDURE — 80069 RENAL FUNCTION PANEL: CPT

## 2024-04-29 PROCEDURE — 6370000000 HC RX 637 (ALT 250 FOR IP): Performed by: NURSE PRACTITIONER

## 2024-04-29 PROCEDURE — 85730 THROMBOPLASTIN TIME PARTIAL: CPT

## 2024-04-29 PROCEDURE — 94761 N-INVAS EAR/PLS OXIMETRY MLT: CPT

## 2024-04-29 PROCEDURE — A4216 STERILE WATER/SALINE, 10 ML: HCPCS | Performed by: INTERNAL MEDICINE

## 2024-04-29 PROCEDURE — 94640 AIRWAY INHALATION TREATMENT: CPT

## 2024-04-29 PROCEDURE — 85610 PROTHROMBIN TIME: CPT

## 2024-04-29 PROCEDURE — 85018 HEMOGLOBIN: CPT

## 2024-04-29 PROCEDURE — 6370000000 HC RX 637 (ALT 250 FOR IP): Performed by: STUDENT IN AN ORGANIZED HEALTH CARE EDUCATION/TRAINING PROGRAM

## 2024-04-29 PROCEDURE — 97535 SELF CARE MNGMENT TRAINING: CPT

## 2024-04-29 PROCEDURE — 99291 CRITICAL CARE FIRST HOUR: CPT | Performed by: INTERNAL MEDICINE

## 2024-04-29 PROCEDURE — 85014 HEMATOCRIT: CPT

## 2024-04-29 PROCEDURE — C9113 INJ PANTOPRAZOLE SODIUM, VIA: HCPCS | Performed by: INTERNAL MEDICINE

## 2024-04-29 PROCEDURE — 85025 COMPLETE CBC W/AUTO DIFF WBC: CPT

## 2024-04-29 PROCEDURE — 99232 SBSQ HOSP IP/OBS MODERATE 35: CPT | Performed by: INTERNAL MEDICINE

## 2024-04-29 PROCEDURE — 2700000000 HC OXYGEN THERAPY PER DAY

## 2024-04-29 RX ORDER — INSULIN LISPRO 100 [IU]/ML
0-16 INJECTION, SOLUTION INTRAVENOUS; SUBCUTANEOUS
Status: DISCONTINUED | OUTPATIENT
Start: 2024-04-29 | End: 2024-04-29

## 2024-04-29 RX ORDER — POLYETHYLENE GLYCOL 3350 17 G/17G
17 POWDER, FOR SOLUTION ORAL DAILY
Status: DISCONTINUED | OUTPATIENT
Start: 2024-04-30 | End: 2024-05-01 | Stop reason: HOSPADM

## 2024-04-29 RX ORDER — HYDROCORTISONE 10 MG/1
20 TABLET ORAL 2 TIMES DAILY
Status: DISCONTINUED | OUTPATIENT
Start: 2024-04-29 | End: 2024-05-01 | Stop reason: HOSPADM

## 2024-04-29 RX ORDER — INSULIN LISPRO 100 [IU]/ML
10 INJECTION, SOLUTION INTRAVENOUS; SUBCUTANEOUS ONCE
Status: DISCONTINUED | OUTPATIENT
Start: 2024-04-29 | End: 2024-04-29

## 2024-04-29 RX ORDER — NOREPINEPHRINE BITARTRATE 0.06 MG/ML
1-100 INJECTION, SOLUTION INTRAVENOUS CONTINUOUS
Status: DISCONTINUED | OUTPATIENT
Start: 2024-04-29 | End: 2024-05-01 | Stop reason: HOSPADM

## 2024-04-29 RX ORDER — INSULIN LISPRO 100 [IU]/ML
0-8 INJECTION, SOLUTION INTRAVENOUS; SUBCUTANEOUS EVERY 4 HOURS
Status: DISCONTINUED | OUTPATIENT
Start: 2024-04-29 | End: 2024-04-29

## 2024-04-29 RX ORDER — INSULIN LISPRO 100 [IU]/ML
0-4 INJECTION, SOLUTION INTRAVENOUS; SUBCUTANEOUS NIGHTLY
Status: DISCONTINUED | OUTPATIENT
Start: 2024-04-29 | End: 2024-04-29

## 2024-04-29 RX ORDER — INSULIN LISPRO 100 [IU]/ML
10 INJECTION, SOLUTION INTRAVENOUS; SUBCUTANEOUS ONCE
Status: COMPLETED | OUTPATIENT
Start: 2024-04-29 | End: 2024-04-29

## 2024-04-29 RX ORDER — INSULIN GLARGINE 100 [IU]/ML
40 INJECTION, SOLUTION SUBCUTANEOUS 2 TIMES DAILY
Status: DISCONTINUED | OUTPATIENT
Start: 2024-04-29 | End: 2024-04-30

## 2024-04-29 RX ORDER — INSULIN LISPRO 100 [IU]/ML
0-16 INJECTION, SOLUTION INTRAVENOUS; SUBCUTANEOUS EVERY 4 HOURS
Status: DISCONTINUED | OUTPATIENT
Start: 2024-04-29 | End: 2024-04-29

## 2024-04-29 RX ORDER — INSULIN LISPRO 100 [IU]/ML
6 INJECTION, SOLUTION INTRAVENOUS; SUBCUTANEOUS
Status: DISCONTINUED | OUTPATIENT
Start: 2024-04-30 | End: 2024-04-30

## 2024-04-29 RX ORDER — INSULIN LISPRO 100 [IU]/ML
0-16 INJECTION, SOLUTION INTRAVENOUS; SUBCUTANEOUS EVERY 4 HOURS
Status: DISCONTINUED | OUTPATIENT
Start: 2024-04-29 | End: 2024-04-30

## 2024-04-29 RX ADMIN — SERTRALINE HYDROCHLORIDE 25 MG: 25 TABLET ORAL at 21:42

## 2024-04-29 RX ADMIN — INSULIN LISPRO 16 UNITS: 100 INJECTION, SOLUTION INTRAVENOUS; SUBCUTANEOUS at 11:30

## 2024-04-29 RX ADMIN — MEROPENEM 500 MG: 500 INJECTION, POWDER, FOR SOLUTION INTRAVENOUS at 15:55

## 2024-04-29 RX ADMIN — METOCLOPRAMIDE HYDROCHLORIDE 10 MG: 5 INJECTION INTRAMUSCULAR; INTRAVENOUS at 04:37

## 2024-04-29 RX ADMIN — DOCUSATE SODIUM 100 MG: 50 LIQUID ORAL at 08:06

## 2024-04-29 RX ADMIN — BUDESONIDE AND FORMOTEROL FUMARATE DIHYDRATE 2 PUFF: 160; 4.5 AEROSOL RESPIRATORY (INHALATION) at 04:02

## 2024-04-29 RX ADMIN — INSULIN GLARGINE 40 UNITS: 100 INJECTION, SOLUTION SUBCUTANEOUS at 17:41

## 2024-04-29 RX ADMIN — MODAFINIL 100 MG: 200 TABLET ORAL at 08:06

## 2024-04-29 RX ADMIN — HYDROCORTISONE 20 MG: 10 TABLET ORAL at 22:31

## 2024-04-29 RX ADMIN — SEVELAMER CARBONATE FOR ORAL SUSPENSION 0.8 G: 800 POWDER, FOR SUSPENSION ORAL at 08:06

## 2024-04-29 RX ADMIN — CHLORHEXIDINE GLUCONATE, 0.12% ORAL RINSE 15 ML: 1.2 SOLUTION DENTAL at 08:05

## 2024-04-29 RX ADMIN — TIOTROPIUM BROMIDE INHALATION SPRAY 2 PUFF: 3.12 SPRAY, METERED RESPIRATORY (INHALATION) at 04:03

## 2024-04-29 RX ADMIN — MIDODRINE HYDROCHLORIDE 20 MG: 5 TABLET ORAL at 15:56

## 2024-04-29 RX ADMIN — Medication 10 ML: at 08:07

## 2024-04-29 RX ADMIN — MUPIROCIN: 20 OINTMENT TOPICAL at 08:08

## 2024-04-29 RX ADMIN — POLYETHYLENE GLYCOL 3350 17 G: 17 POWDER, FOR SOLUTION ORAL at 08:06

## 2024-04-29 RX ADMIN — Medication 10 ML: at 22:34

## 2024-04-29 RX ADMIN — SODIUM CHLORIDE, PRESERVATIVE FREE 40 MG: 5 INJECTION INTRAVENOUS at 04:37

## 2024-04-29 RX ADMIN — HYDROXYZINE HYDROCHLORIDE 25 MG: 25 TABLET ORAL at 21:42

## 2024-04-29 RX ADMIN — INSULIN LISPRO 10 UNITS: 100 INJECTION, SOLUTION INTRAVENOUS; SUBCUTANEOUS at 18:07

## 2024-04-29 RX ADMIN — MIDODRINE HYDROCHLORIDE 20 MG: 5 TABLET ORAL at 08:06

## 2024-04-29 RX ADMIN — BUDESONIDE AND FORMOTEROL FUMARATE DIHYDRATE 2 PUFF: 160; 4.5 AEROSOL RESPIRATORY (INHALATION) at 16:05

## 2024-04-29 RX ADMIN — HYDROCORTISONE SODIUM SUCCINATE 25 MG: 100 INJECTION, POWDER, FOR SOLUTION INTRAMUSCULAR; INTRAVENOUS at 00:01

## 2024-04-29 RX ADMIN — INSULIN LISPRO 12 UNITS: 100 INJECTION, SOLUTION INTRAVENOUS; SUBCUTANEOUS at 21:36

## 2024-04-29 RX ADMIN — ATORVASTATIN CALCIUM 40 MG: 40 TABLET, FILM COATED ORAL at 21:42

## 2024-04-29 RX ADMIN — SEVELAMER CARBONATE FOR ORAL SUSPENSION 0.8 G: 800 POWDER, FOR SUSPENSION ORAL at 13:46

## 2024-04-29 RX ADMIN — ARIPIPRAZOLE 5 MG: 5 TABLET ORAL at 08:05

## 2024-04-29 RX ADMIN — CHLORHEXIDINE GLUCONATE, 0.12% ORAL RINSE 15 ML: 1.2 SOLUTION DENTAL at 22:31

## 2024-04-29 RX ADMIN — MUPIROCIN: 20 OINTMENT TOPICAL at 22:31

## 2024-04-29 RX ADMIN — SODIUM CHLORIDE, PRESERVATIVE FREE 10 ML: 5 INJECTION INTRAVENOUS at 08:06

## 2024-04-29 RX ADMIN — HYDROCORTISONE SODIUM SUCCINATE 25 MG: 100 INJECTION, POWDER, FOR SOLUTION INTRAMUSCULAR; INTRAVENOUS at 15:55

## 2024-04-29 RX ADMIN — SODIUM CHLORIDE, PRESERVATIVE FREE 10 ML: 5 INJECTION INTRAVENOUS at 22:34

## 2024-04-29 RX ADMIN — HYDROXYZINE HYDROCHLORIDE 25 MG: 25 TABLET ORAL at 08:07

## 2024-04-29 RX ADMIN — SEVELAMER CARBONATE FOR ORAL SUSPENSION 0.8 G: 800 POWDER, FOR SUSPENSION ORAL at 21:43

## 2024-04-29 RX ADMIN — INSULIN LISPRO 8 UNITS: 100 INJECTION, SOLUTION INTRAVENOUS; SUBCUTANEOUS at 06:08

## 2024-04-29 RX ADMIN — SODIUM CHLORIDE, PRESERVATIVE FREE 40 MG: 5 INJECTION INTRAVENOUS at 15:56

## 2024-04-29 RX ADMIN — INSULIN LISPRO 16 UNITS: 100 INJECTION, SOLUTION INTRAVENOUS; SUBCUTANEOUS at 16:42

## 2024-04-29 RX ADMIN — MIDODRINE HYDROCHLORIDE 20 MG: 5 TABLET ORAL at 11:30

## 2024-04-29 RX ADMIN — HYDROCORTISONE SODIUM SUCCINATE 25 MG: 100 INJECTION, POWDER, FOR SOLUTION INTRAMUSCULAR; INTRAVENOUS at 08:06

## 2024-04-29 RX ADMIN — ACETAMINOPHEN 650 MG: 325 TABLET ORAL at 16:30

## 2024-04-29 RX ADMIN — INSULIN LISPRO 16 UNITS: 100 INJECTION, SOLUTION INTRAVENOUS; SUBCUTANEOUS at 13:46

## 2024-04-29 RX ADMIN — METOCLOPRAMIDE HYDROCHLORIDE 10 MG: 5 INJECTION INTRAMUSCULAR; INTRAVENOUS at 11:30

## 2024-04-29 ASSESSMENT — PAIN SCALES - GENERAL
PAINLEVEL_OUTOF10: 0
PAINLEVEL_OUTOF10: 0
PAINLEVEL_OUTOF10: 8

## 2024-04-29 ASSESSMENT — PAIN DESCRIPTION - ORIENTATION: ORIENTATION: RIGHT;LEFT

## 2024-04-29 ASSESSMENT — PAIN DESCRIPTION - DESCRIPTORS: DESCRIPTORS: ACHING

## 2024-04-29 ASSESSMENT — PAIN DESCRIPTION - LOCATION: LOCATION: LEG

## 2024-04-29 NOTE — INTERDISCIPLINARY ROUNDS
Spiritual Care Services     Summary of Visit:  Attended ICU interdisciplinary rounds. Pt is awake, on NC. Family not present. AD in EMR, dtr is POA. Pat Tradition is Oriental orthodox, important.   Spiritual Health remains available for the patient and family.   Encounter Summary  Encounter Overview/Reason: Interdisciplinary rounds  Service Provided For: Patient  Referral/Consult From: Rounding  Support System: Children, Family members  Last Encounter : 04/22/24  Complexity of Encounter: Moderate  Begin Time: 1000  End Time : 1020  Total Time Calculated: 20 min        Spiritual/Emotional needs  Type: Spiritual Support     Grief, Loss, and Adjustments  Type: Adjustment to illness                Spiritual Assessment/Intervention/Outcomes:    Assessment: Compromised coping    Intervention: Prayer (assurance of)/[place], Sustaining Presence/Ministry of presence    Outcome: Receptive      Care Plan:    Plan and Referrals  Plan/Referrals: Continue Support (comment)      Spiritual Care Services   Electronically signed by LUKAS Rosenthal on 4/29/2024 at 11:03 AM.    To reach a  for emotional and spiritual support, place an EPIC consult request.   If a  is needed immediately, dial “0” and ask to page the on-call .

## 2024-04-29 NOTE — FLOWSHEET NOTE
0800: Report received from adriel Licea care of patient. Patient assessments completed, patient black tarry soft stools, occult blood completed, positive. Reported to Milagros Warren NP.  Patient provided with incontinence care, zinc applied to buttocks, turned and repositioned. .Electronically signed by Portia Germain RN on 4/29/2024 at 1:28 PM  1030 Patient multiply black soft stools, request for H&H, drawn and sent to lab.   1100: Patient HgB 9.8, no change from daily lab. NP notified. Patient assist with all meals, tolerating Oral diet at this time, remains on tube feeding, via [place], patient tolerating well. Call light in place.    1300: Patient subclavian CVC no blood return, power flushed double lumens, blood return now present.   1333: Enteral feeding discontinued a this time, will monitor oral intake overnight. .Electronically signed by Portia Germain RN on 4/29/2024 at 1:34 PM  1645: Perfect serve sent to Dr Nino in regards to elevated blood sugar, request to review medications, pending any new orders. .Electronically signed by Portia Germain RN on 4/29/2024 at 4:45 PM

## 2024-04-30 LAB
ALBUMIN SERPL-MCNC: 4 G/DL (ref 3.5–4.6)
ANION GAP SERPL CALCULATED.3IONS-SCNC: 17 MEQ/L (ref 9–15)
BASOPHILS # BLD: 0 K/UL (ref 0–0.2)
BASOPHILS NFR BLD: 0.1 %
BUN SERPL-MCNC: 86 MG/DL (ref 8–23)
CALCIUM SERPL-MCNC: 10.1 MG/DL (ref 8.5–9.9)
CHLORIDE SERPL-SCNC: 91 MEQ/L (ref 95–107)
CO2 SERPL-SCNC: 26 MEQ/L (ref 20–31)
CREAT SERPL-MCNC: 4.95 MG/DL (ref 0.5–0.9)
EOSINOPHIL # BLD: 0.1 K/UL (ref 0–0.7)
EOSINOPHIL NFR BLD: 0.5 %
ERYTHROCYTE [DISTWIDTH] IN BLOOD BY AUTOMATED COUNT: 17.8 % (ref 11.5–14.5)
GLUCOSE BLD-MCNC: 111 MG/DL (ref 70–99)
GLUCOSE BLD-MCNC: 140 MG/DL (ref 70–99)
GLUCOSE BLD-MCNC: 156 MG/DL (ref 70–99)
GLUCOSE BLD-MCNC: 163 MG/DL (ref 70–99)
GLUCOSE BLD-MCNC: 222 MG/DL (ref 70–99)
GLUCOSE BLD-MCNC: 248 MG/DL (ref 70–99)
GLUCOSE BLD-MCNC: 89 MG/DL (ref 70–99)
GLUCOSE SERPL-MCNC: 156 MG/DL (ref 70–99)
HCT VFR BLD AUTO: 30 % (ref 37–47)
HGB BLD-MCNC: 9.6 G/DL (ref 12–16)
INR PPP: 1.2
LYMPHOCYTES # BLD: 0.9 K/UL (ref 1–4.8)
LYMPHOCYTES NFR BLD: 4.6 %
MCH RBC QN AUTO: 33.6 PG (ref 27–31.3)
MCHC RBC AUTO-ENTMCNC: 32 % (ref 33–37)
MCV RBC AUTO: 104.9 FL (ref 79.4–94.8)
MONOCYTES # BLD: 1.4 K/UL (ref 0.2–0.8)
MONOCYTES NFR BLD: 6.8 %
NEUTROPHILS # BLD: 17.4 K/UL (ref 1.4–6.5)
NEUTS SEG NFR BLD: 87.3 %
PERFORMED ON: ABNORMAL
PERFORMED ON: NORMAL
PHOSPHATE SERPL-MCNC: 3.9 MG/DL (ref 2.3–4.8)
PLATELET # BLD AUTO: 69 K/UL (ref 130–400)
POTASSIUM SERPL-SCNC: 5.1 MEQ/L (ref 3.4–4.9)
PROTHROMBIN TIME: 15.5 SEC (ref 12.3–14.9)
RBC # BLD AUTO: 2.86 M/UL (ref 4.2–5.4)
SODIUM SERPL-SCNC: 134 MEQ/L (ref 135–144)
WBC # BLD AUTO: 19.9 K/UL (ref 4.8–10.8)

## 2024-04-30 PROCEDURE — 94761 N-INVAS EAR/PLS OXIMETRY MLT: CPT

## 2024-04-30 PROCEDURE — 2580000003 HC RX 258: Performed by: INTERNAL MEDICINE

## 2024-04-30 PROCEDURE — 92526 ORAL FUNCTION THERAPY: CPT

## 2024-04-30 PROCEDURE — 6370000000 HC RX 637 (ALT 250 FOR IP): Performed by: NURSE PRACTITIONER

## 2024-04-30 PROCEDURE — 6370000000 HC RX 637 (ALT 250 FOR IP): Performed by: INTERNAL MEDICINE

## 2024-04-30 PROCEDURE — 99291 CRITICAL CARE FIRST HOUR: CPT | Performed by: INTERNAL MEDICINE

## 2024-04-30 PROCEDURE — 94640 AIRWAY INHALATION TREATMENT: CPT

## 2024-04-30 PROCEDURE — 6370000000 HC RX 637 (ALT 250 FOR IP): Performed by: PHYSICIAN ASSISTANT

## 2024-04-30 PROCEDURE — 85025 COMPLETE CBC W/AUTO DIFF WBC: CPT

## 2024-04-30 PROCEDURE — 97535 SELF CARE MNGMENT TRAINING: CPT

## 2024-04-30 PROCEDURE — 6360000002 HC RX W HCPCS: Performed by: INTERNAL MEDICINE

## 2024-04-30 PROCEDURE — C9113 INJ PANTOPRAZOLE SODIUM, VIA: HCPCS | Performed by: INTERNAL MEDICINE

## 2024-04-30 PROCEDURE — 99232 SBSQ HOSP IP/OBS MODERATE 35: CPT | Performed by: INTERNAL MEDICINE

## 2024-04-30 PROCEDURE — 90935 HEMODIALYSIS ONE EVALUATION: CPT

## 2024-04-30 PROCEDURE — A4216 STERILE WATER/SALINE, 10 ML: HCPCS | Performed by: INTERNAL MEDICINE

## 2024-04-30 PROCEDURE — 6370000000 HC RX 637 (ALT 250 FOR IP): Performed by: STUDENT IN AN ORGANIZED HEALTH CARE EDUCATION/TRAINING PROGRAM

## 2024-04-30 PROCEDURE — 2000000000 HC ICU R&B

## 2024-04-30 PROCEDURE — 2700000000 HC OXYGEN THERAPY PER DAY

## 2024-04-30 PROCEDURE — 85610 PROTHROMBIN TIME: CPT

## 2024-04-30 PROCEDURE — 99232 SBSQ HOSP IP/OBS MODERATE 35: CPT

## 2024-04-30 PROCEDURE — 97112 NEUROMUSCULAR REEDUCATION: CPT

## 2024-04-30 PROCEDURE — P9047 ALBUMIN (HUMAN), 25%, 50ML: HCPCS | Performed by: INTERNAL MEDICINE

## 2024-04-30 PROCEDURE — 80069 RENAL FUNCTION PANEL: CPT

## 2024-04-30 PROCEDURE — 99222 1ST HOSP IP/OBS MODERATE 55: CPT

## 2024-04-30 RX ORDER — INSULIN LISPRO 100 [IU]/ML
0-16 INJECTION, SOLUTION INTRAVENOUS; SUBCUTANEOUS
Status: DISCONTINUED | OUTPATIENT
Start: 2024-04-30 | End: 2024-04-30

## 2024-04-30 RX ORDER — INSULIN LISPRO 100 [IU]/ML
4 INJECTION, SOLUTION INTRAVENOUS; SUBCUTANEOUS
Status: DISCONTINUED | OUTPATIENT
Start: 2024-04-30 | End: 2024-04-30

## 2024-04-30 RX ORDER — INSULIN GLARGINE 100 [IU]/ML
25 INJECTION, SOLUTION SUBCUTANEOUS 2 TIMES DAILY
Status: DISCONTINUED | OUTPATIENT
Start: 2024-04-30 | End: 2024-04-30

## 2024-04-30 RX ORDER — INSULIN GLARGINE 100 [IU]/ML
20 INJECTION, SOLUTION SUBCUTANEOUS 2 TIMES DAILY
Status: DISCONTINUED | OUTPATIENT
Start: 2024-04-30 | End: 2024-04-30

## 2024-04-30 RX ORDER — INSULIN LISPRO 100 [IU]/ML
0-8 INJECTION, SOLUTION INTRAVENOUS; SUBCUTANEOUS
Status: DISCONTINUED | OUTPATIENT
Start: 2024-04-30 | End: 2024-04-30

## 2024-04-30 RX ORDER — INSULIN LISPRO 100 [IU]/ML
0-4 INJECTION, SOLUTION INTRAVENOUS; SUBCUTANEOUS NIGHTLY
Status: DISCONTINUED | OUTPATIENT
Start: 2024-04-30 | End: 2024-05-01 | Stop reason: HOSPADM

## 2024-04-30 RX ORDER — INSULIN GLARGINE 100 [IU]/ML
12 INJECTION, SOLUTION SUBCUTANEOUS 2 TIMES DAILY
Status: DISCONTINUED | OUTPATIENT
Start: 2024-05-01 | End: 2024-05-01 | Stop reason: HOSPADM

## 2024-04-30 RX ORDER — INSULIN LISPRO 100 [IU]/ML
0-4 INJECTION, SOLUTION INTRAVENOUS; SUBCUTANEOUS NIGHTLY
Status: DISCONTINUED | OUTPATIENT
Start: 2024-04-30 | End: 2024-04-30

## 2024-04-30 RX ORDER — ALPRAZOLAM 0.5 MG/1
0.25 TABLET ORAL 2 TIMES DAILY PRN
Status: DISCONTINUED | OUTPATIENT
Start: 2024-04-30 | End: 2024-05-01 | Stop reason: HOSPADM

## 2024-04-30 RX ORDER — INSULIN LISPRO 100 [IU]/ML
0-4 INJECTION, SOLUTION INTRAVENOUS; SUBCUTANEOUS
Status: DISCONTINUED | OUTPATIENT
Start: 2024-05-01 | End: 2024-05-01 | Stop reason: HOSPADM

## 2024-04-30 RX ORDER — INSULIN LISPRO 100 [IU]/ML
3 INJECTION, SOLUTION INTRAVENOUS; SUBCUTANEOUS
Status: DISCONTINUED | OUTPATIENT
Start: 2024-05-01 | End: 2024-05-01 | Stop reason: HOSPADM

## 2024-04-30 RX ADMIN — MIDODRINE HYDROCHLORIDE 20 MG: 5 TABLET ORAL at 07:49

## 2024-04-30 RX ADMIN — SERTRALINE HYDROCHLORIDE 25 MG: 25 TABLET ORAL at 21:08

## 2024-04-30 RX ADMIN — ALBUMIN (HUMAN) 25 G: 0.25 INJECTION, SOLUTION INTRAVENOUS at 10:26

## 2024-04-30 RX ADMIN — BUDESONIDE AND FORMOTEROL FUMARATE DIHYDRATE 2 PUFF: 160; 4.5 AEROSOL RESPIRATORY (INHALATION) at 15:26

## 2024-04-30 RX ADMIN — HYDROXYZINE HYDROCHLORIDE 25 MG: 25 TABLET ORAL at 21:09

## 2024-04-30 RX ADMIN — MIDODRINE HYDROCHLORIDE 20 MG: 5 TABLET ORAL at 11:57

## 2024-04-30 RX ADMIN — ARIPIPRAZOLE 5 MG: 5 TABLET ORAL at 07:56

## 2024-04-30 RX ADMIN — ACETAMINOPHEN 650 MG: 325 TABLET ORAL at 16:52

## 2024-04-30 RX ADMIN — SEVELAMER CARBONATE FOR ORAL SUSPENSION 0.8 G: 800 POWDER, FOR SUSPENSION ORAL at 21:07

## 2024-04-30 RX ADMIN — SODIUM CHLORIDE, PRESERVATIVE FREE 10 ML: 5 INJECTION INTRAVENOUS at 08:03

## 2024-04-30 RX ADMIN — BUDESONIDE AND FORMOTEROL FUMARATE DIHYDRATE 2 PUFF: 160; 4.5 AEROSOL RESPIRATORY (INHALATION) at 03:31

## 2024-04-30 RX ADMIN — HYDROCORTISONE 20 MG: 10 TABLET ORAL at 07:49

## 2024-04-30 RX ADMIN — MEROPENEM 500 MG: 500 INJECTION, POWDER, FOR SOLUTION INTRAVENOUS at 16:05

## 2024-04-30 RX ADMIN — HYDROCORTISONE 20 MG: 10 TABLET ORAL at 21:08

## 2024-04-30 RX ADMIN — MIDODRINE HYDROCHLORIDE 20 MG: 5 TABLET ORAL at 15:56

## 2024-04-30 RX ADMIN — SODIUM CHLORIDE, PRESERVATIVE FREE 40 MG: 5 INJECTION INTRAVENOUS at 04:54

## 2024-04-30 RX ADMIN — ATORVASTATIN CALCIUM 40 MG: 40 TABLET, FILM COATED ORAL at 21:08

## 2024-04-30 RX ADMIN — SEVELAMER CARBONATE FOR ORAL SUSPENSION 0.8 G: 800 POWDER, FOR SUSPENSION ORAL at 07:56

## 2024-04-30 RX ADMIN — SODIUM CHLORIDE, PRESERVATIVE FREE 40 MG: 5 INJECTION INTRAVENOUS at 16:01

## 2024-04-30 RX ADMIN — ALPRAZOLAM 0.25 MG: 0.5 TABLET ORAL at 14:47

## 2024-04-30 RX ADMIN — CHLORHEXIDINE GLUCONATE, 0.12% ORAL RINSE 15 ML: 1.2 SOLUTION DENTAL at 08:54

## 2024-04-30 RX ADMIN — HYDROXYZINE HYDROCHLORIDE 25 MG: 25 TABLET ORAL at 07:49

## 2024-04-30 RX ADMIN — SEVELAMER CARBONATE FOR ORAL SUSPENSION 0.8 G: 800 POWDER, FOR SUSPENSION ORAL at 16:09

## 2024-04-30 RX ADMIN — INSULIN LISPRO 4 UNITS: 100 INJECTION, SOLUTION INTRAVENOUS; SUBCUTANEOUS at 00:27

## 2024-04-30 RX ADMIN — INSULIN GLARGINE 20 UNITS: 100 INJECTION, SOLUTION SUBCUTANEOUS at 21:07

## 2024-04-30 RX ADMIN — MODAFINIL 100 MG: 200 TABLET ORAL at 07:49

## 2024-04-30 RX ADMIN — MUPIROCIN: 20 OINTMENT TOPICAL at 08:54

## 2024-04-30 RX ADMIN — TIOTROPIUM BROMIDE INHALATION SPRAY 2 PUFF: 3.12 SPRAY, METERED RESPIRATORY (INHALATION) at 03:31

## 2024-04-30 ASSESSMENT — ENCOUNTER SYMPTOMS
SHORTNESS OF BREATH: 0
BACK PAIN: 0

## 2024-04-30 ASSESSMENT — PAIN SCALES - GENERAL
PAINLEVEL_OUTOF10: 0
PAINLEVEL_OUTOF10: 7

## 2024-04-30 ASSESSMENT — PAIN DESCRIPTION - LOCATION: LOCATION: FOOT

## 2024-04-30 ASSESSMENT — PAIN DESCRIPTION - DESCRIPTORS: DESCRIPTORS: ACHING

## 2024-04-30 ASSESSMENT — PAIN DESCRIPTION - ORIENTATION: ORIENTATION: RIGHT

## 2024-04-30 NOTE — PLAN OF CARE
Problem: Discharge Planning  Goal: Discharge to home or other facility with appropriate resources  4/28/2024 1040 by Supriya Alvarenga RN  Outcome: Progressing  Flowsheets (Taken 4/28/2024 0700)  Discharge to home or other facility with appropriate resources: Identify barriers to discharge with patient and caregiver     Problem: Skin/Tissue Integrity  Goal: Absence of new skin breakdown  Description: 1.  Monitor for areas of redness and/or skin breakdown  2.  Assess vascular access sites hourly  3.  Every 4-6 hours minimum:  Change oxygen saturation probe site  4.  Every 4-6 hours:  If on nasal continuous positive airway pressure, respiratory therapy assess nares and determine need for appliance change or resting period.  4/28/2024 1040 by Supriya Alvarenga RN  Outcome: Progressing     Problem: Safety - Adult  Goal: Free from fall injury  4/28/2024 1040 by Supriya Alvarenga RN  Outcome: Progressing     Problem: Chronic Conditions and Co-morbidities  Goal: Patient's chronic conditions and co-morbidity symptoms are monitored and maintained or improved  4/28/2024 1040 by Supriya Alvarenga RN  Outcome: Progressing  Flowsheets (Taken 4/28/2024 0700)  Care Plan - Patient's Chronic Conditions and Co-Morbidity Symptoms are Monitored and Maintained or Improved: Monitor and assess patient's chronic conditions and comorbid symptoms for stability, deterioration, or improvement     Problem: Respiratory - Adult  Goal: Achieves optimal ventilation and oxygenation  4/28/2024 1040 by Supriya Alvarenga RN  Outcome: Progressing  Flowsheets (Taken 4/28/2024 0700)  Achieves optimal ventilation and oxygenation: Assess for changes in respiratory status     Problem: Cardiovascular - Adult  Goal: Maintains optimal cardiac output and hemodynamic stability  4/28/2024 1040 by Supriya Alvarenga RN  Outcome: Progressing  Flowsheets (Taken 4/28/2024 0700)  Maintains optimal cardiac output and hemodynamic stability: Monitor blood pressure and heart rate   
  Problem: Discharge Planning  Goal: Discharge to home or other facility with appropriate resources  Outcome: Progressing     Problem: Skin/Tissue Integrity  Goal: Absence of new skin breakdown  Description: 1.  Monitor for areas of redness and/or skin breakdown  2.  Assess vascular access sites hourly  3.  Every 4-6 hours minimum:  Change oxygen saturation probe site  4.  Every 4-6 hours:  If on nasal continuous positive airway pressure, respiratory therapy assess nares and determine need for appliance change or resting period.  Outcome: Progressing     Problem: Safety - Adult  Goal: Free from fall injury  Outcome: Progressing     Problem: Chronic Conditions and Co-morbidities  Goal: Patient's chronic conditions and co-morbidity symptoms are monitored and maintained or improved  Outcome: Progressing     Problem: Neurosensory - Adult  Goal: Achieves stable or improved neurological status  Outcome: Progressing     Problem: Respiratory - Adult  Goal: Achieves optimal ventilation and oxygenation  Outcome: Progressing     Problem: Cardiovascular - Adult  Goal: Maintains optimal cardiac output and hemodynamic stability  Outcome: Progressing  Goal: Absence of cardiac dysrhythmias or at baseline  Outcome: Progressing     Problem: Skin/Tissue Integrity - Adult  Goal: Skin integrity remains intact  Outcome: Progressing  Goal: Incisions, wounds, or drain sites healing without S/S of infection  Outcome: Progressing     Problem: Musculoskeletal - Adult  Goal: Return mobility to safest level of function  Outcome: Progressing     Problem: Gastrointestinal - Adult  Goal: Maintains or returns to baseline bowel function  Outcome: Progressing  Goal: Maintains adequate nutritional intake  Outcome: Progressing     Problem: Genitourinary - Adult  Goal: Absence of urinary retention  Outcome: Progressing     Problem: Hematologic - Adult  Goal: Maintains hematologic stability  Outcome: Progressing     Problem: Pain  Goal: 
  Problem: Discharge Planning  Goal: Discharge to home or other facility with appropriate resources  Outcome: Progressing  Flowsheets (Taken 4/16/2024 2000)  Discharge to home or other facility with appropriate resources: Identify barriers to discharge with patient and caregiver     Problem: Skin/Tissue Integrity  Goal: Absence of new skin breakdown  Description: 1.  Monitor for areas of redness and/or skin breakdown  2.  Assess vascular access sites hourly  3.  Every 4-6 hours minimum:  Change oxygen saturation probe site  4.  Every 4-6 hours:  If on nasal continuous positive airway pressure, respiratory therapy assess nares and determine need for appliance change or resting period.  Outcome: Progressing     Problem: Safety - Adult  Goal: Free from fall injury  Outcome: Progressing     Problem: Chronic Conditions and Co-morbidities  Goal: Patient's chronic conditions and co-morbidity symptoms are monitored and maintained or improved  Outcome: Progressing  Flowsheets (Taken 4/16/2024 2000)  Care Plan - Patient's Chronic Conditions and Co-Morbidity Symptoms are Monitored and Maintained or Improved: Monitor and assess patient's chronic conditions and comorbid symptoms for stability, deterioration, or improvement     Problem: Neurosensory - Adult  Goal: Achieves stable or improved neurological status  Outcome: Progressing  Flowsheets (Taken 4/16/2024 2000)  Achieves stable or improved neurological status: Assess for and report changes in neurological status     Problem: Respiratory - Adult  Goal: Achieves optimal ventilation and oxygenation  Outcome: Progressing  Flowsheets (Taken 4/16/2024 2000)  Achieves optimal ventilation and oxygenation: Assess for changes in respiratory status     Problem: Cardiovascular - Adult  Goal: Maintains optimal cardiac output and hemodynamic stability  Outcome: Progressing  Flowsheets (Taken 4/16/2024 2000)  Maintains optimal cardiac output and hemodynamic stability: Monitor blood 
  Problem: Discharge Planning  Goal: Discharge to home or other facility with appropriate resources  Outcome: Progressing  Flowsheets (Taken 4/17/2024 2000 by Nevin Cage, RN)  Discharge to home or other facility with appropriate resources: Identify barriers to discharge with patient and caregiver     Problem: Skin/Tissue Integrity  Goal: Absence of new skin breakdown  Description: 1.  Monitor for areas of redness and/or skin breakdown  2.  Assess vascular access sites hourly  3.  Every 4-6 hours minimum:  Change oxygen saturation probe site  4.  Every 4-6 hours:  If on nasal continuous positive airway pressure, respiratory therapy assess nares and determine need for appliance change or resting period.  Outcome: Progressing     Problem: Safety - Adult  Goal: Free from fall injury  Outcome: Progressing     Problem: Chronic Conditions and Co-morbidities  Goal: Patient's chronic conditions and co-morbidity symptoms are monitored and maintained or improved  Outcome: Progressing  Flowsheets (Taken 4/17/2024 2000 by Nevin Cage, RN)  Care Plan - Patient's Chronic Conditions and Co-Morbidity Symptoms are Monitored and Maintained or Improved: Monitor and assess patient's chronic conditions and comorbid symptoms for stability, deterioration, or improvement     Problem: Neurosensory - Adult  Goal: Achieves stable or improved neurological status  Outcome: Progressing     Problem: Respiratory - Adult  Goal: Achieves optimal ventilation and oxygenation  Outcome: Progressing     Problem: Cardiovascular - Adult  Goal: Maintains optimal cardiac output and hemodynamic stability  Outcome: Progressing  Flowsheets (Taken 4/17/2024 2000 by Nevin Cage, RN)  Maintains optimal cardiac output and hemodynamic stability: Monitor blood pressure and heart rate  Goal: Absence of cardiac dysrhythmias or at baseline  Outcome: Progressing  Flowsheets (Taken 4/17/2024 2000 by Nevin Cage, RN)  Absence of cardiac dysrhythmias or 
  Problem: Discharge Planning  Goal: Discharge to home or other facility with appropriate resources  Outcome: Progressing  Flowsheets (Taken 4/26/2024 2000)  Discharge to home or other facility with appropriate resources: Identify barriers to discharge with patient and caregiver     Problem: Skin/Tissue Integrity  Goal: Absence of new skin breakdown  Description: 1.  Monitor for areas of redness and/or skin breakdown  2.  Assess vascular access sites hourly  3.  Every 4-6 hours minimum:  Change oxygen saturation probe site  4.  Every 4-6 hours:  If on nasal continuous positive airway pressure, respiratory therapy assess nares and determine need for appliance change or resting period.  Outcome: Progressing     Problem: Safety - Adult  Goal: Free from fall injury  Outcome: Progressing     Problem: Chronic Conditions and Co-morbidities  Goal: Patient's chronic conditions and co-morbidity symptoms are monitored and maintained or improved  Outcome: Progressing  Flowsheets (Taken 4/26/2024 2000)  Care Plan - Patient's Chronic Conditions and Co-Morbidity Symptoms are Monitored and Maintained or Improved: Monitor and assess patient's chronic conditions and comorbid symptoms for stability, deterioration, or improvement     Problem: Neurosensory - Adult  Goal: Achieves stable or improved neurological status  Outcome: Progressing  Flowsheets (Taken 4/26/2024 2000)  Achieves stable or improved neurological status:   Assess for and report changes in neurological status   Initiate measures to prevent increased intracranial pressure   Maintain blood pressure and fluid volume within ordered parameters to optimize cerebral perfusion and minimize risk of hemorrhage   Monitor temperature, glucose, and sodium. Initiate appropriate interventions as ordered     Problem: Respiratory - Adult  Goal: Achieves optimal ventilation and oxygenation  Outcome: Progressing  Flowsheets (Taken 4/26/2024 2000)  Achieves optimal ventilation and 
  Problem: Discharge Planning  Goal: Discharge to home or other facility with appropriate resources  Outcome: Progressing  Flowsheets (Taken 4/27/2024 2000)  Discharge to home or other facility with appropriate resources:   Identify barriers to discharge with patient and caregiver   Arrange for needed discharge resources and transportation as appropriate   Identify discharge learning needs (meds, wound care, etc)   Arrange for interpreters to assist at discharge as needed     Problem: Skin/Tissue Integrity  Goal: Absence of new skin breakdown  Description: 1.  Monitor for areas of redness and/or skin breakdown  2.  Assess vascular access sites hourly  3.  Every 4-6 hours minimum:  Change oxygen saturation probe site  4.  Every 4-6 hours:  If on nasal continuous positive airway pressure, respiratory therapy assess nares and determine need for appliance change or resting period.  Outcome: Progressing     Problem: Safety - Adult  Goal: Free from fall injury  Outcome: Progressing     Problem: Chronic Conditions and Co-morbidities  Goal: Patient's chronic conditions and co-morbidity symptoms are monitored and maintained or improved  Outcome: Progressing  Flowsheets (Taken 4/27/2024 2000)  Care Plan - Patient's Chronic Conditions and Co-Morbidity Symptoms are Monitored and Maintained or Improved:   Monitor and assess patient's chronic conditions and comorbid symptoms for stability, deterioration, or improvement   Collaborate with multidisciplinary team to address chronic and comorbid conditions and prevent exacerbation or deterioration   Update acute care plan with appropriate goals if chronic or comorbid symptoms are exacerbated and prevent overall improvement and discharge     Problem: Neurosensory - Adult  Goal: Achieves stable or improved neurological status  Outcome: Progressing  Flowsheets (Taken 4/27/2024 2000)  Achieves stable or improved neurological status:   Assess for and report changes in neurological 
  Problem: Discharge Planning  Goal: Discharge to home or other facility with appropriate resources  Outcome: Progressing  Flowsheets (Taken 4/29/2024 2000)  Discharge to home or other facility with appropriate resources:   Identify barriers to discharge with patient and caregiver   Arrange for needed discharge resources and transportation as appropriate   Identify discharge learning needs (meds, wound care, etc)   Arrange for interpreters to assist at discharge as needed   Refer to discharge planning if patient needs post-hospital services based on physician order or complex needs related to functional status, cognitive ability or social support system     Problem: Skin/Tissue Integrity  Goal: Absence of new skin breakdown  Description: 1.  Monitor for areas of redness and/or skin breakdown  2.  Assess vascular access sites hourly  3.  Every 4-6 hours minimum:  Change oxygen saturation probe site  4.  Every 4-6 hours:  If on nasal continuous positive airway pressure, respiratory therapy assess nares and determine need for appliance change or resting period.  Outcome: Progressing     Problem: Safety - Adult  Goal: Free from fall injury  Outcome: Progressing     Problem: Chronic Conditions and Co-morbidities  Goal: Patient's chronic conditions and co-morbidity symptoms are monitored and maintained or improved  Outcome: Progressing  Flowsheets (Taken 4/29/2024 2000)  Care Plan - Patient's Chronic Conditions and Co-Morbidity Symptoms are Monitored and Maintained or Improved:   Monitor and assess patient's chronic conditions and comorbid symptoms for stability, deterioration, or improvement   Collaborate with multidisciplinary team to address chronic and comorbid conditions and prevent exacerbation or deterioration   Update acute care plan with appropriate goals if chronic or comorbid symptoms are exacerbated and prevent overall improvement and discharge     Problem: Neurosensory - Adult  Goal: Achieves stable or 
BSE completed       
Nutrition Problem #1: Inadequate oral intake  Intervention: Food and/or Nutrient Delivery: Modify Tube Feeding  Nutritional      Problem: Nutrition Deficit:  Goal: Optimize nutritional status  Outcome: Not Progressing  Flowsheets (Taken 4/26/2024 1019)  Nutrient intake appropriate for improving, restoring, or maintaining nutritional needs:   Monitor oral intake, labs, and treatment plans   Assess nutritional status and recommend course of action   Recommend appropriate diets, oral nutritional supplements, and vitamin/mineral supplements   Recommend, monitor, and adjust tube feedings and TPN/PPN based on assessed needs     
Nutrition Problem #1: Swallowing difficulty  Intervention: Food and/or Nutrient Delivery: Continue Current Diet, Start Oral Nutrition Supplement (Continue diet dysphagia pureed with mildly thick liquids  Remove NGT if po remains > 50% today  Begin a thickened oral nutrition supplement BID)      
See OT evaluation for all goals and OT POC. Electronically signed by Malou Cochran OTR/L on 4/17/2024 at 4:15 PM    
Gastrointestinal - Adult  Goal: Maintains or returns to baseline bowel function  Outcome: Progressing  Flowsheets (Taken 4/25/2024 0800)  Maintains or returns to baseline bowel function: Assess bowel function  Goal: Maintains adequate nutritional intake  Outcome: Progressing  Flowsheets (Taken 4/25/2024 0800)  Maintains adequate nutritional intake: Monitor intake and output, weight and lab values     Problem: Genitourinary - Adult  Goal: Absence of urinary retention  Outcome: Progressing  Flowsheets (Taken 4/25/2024 0800)  Absence of urinary retention: Monitor intake/output and perform bladder scan as needed     Problem: Hematologic - Adult  Goal: Maintains hematologic stability  Outcome: Progressing     Problem: Pain  Goal: Verbalizes/displays adequate comfort level or baseline comfort level  Outcome: Progressing     Problem: Safety - Medical Restraint  Goal: Remains free of injury from restraints (Restraint for Interference with Medical Device)  Description: INTERVENTIONS:  1. Determine that other, less restrictive measures have been tried or would not be effective before applying the restraint  2. Evaluate the patient's condition at the time of restraint application  3. Inform patient/family regarding the reason for restraint  4. Q2H: Monitor safety, psychosocial status, comfort, nutrition and hydration  Outcome: Completed     Flowsheets (Taken 4/25/2024 0800)  Skin Integrity Remains Intact: Monitor for areas of redness and/or skin breakdown  Goal: Incisions, wounds, or drain sites healing without S/S of infection  Outcome: Progressing  Flowsheets (Taken 4/25/2024 0800)  Incisions, Wounds, or Drain Sites Healing Without Sign and Symptoms of Infection: TWICE DAILY: Assess and document skin integrity     
Hematologic - Adult  Goal: Maintains hematologic stability  Outcome: Progressing     Problem: Pain  Goal: Verbalizes/displays adequate comfort level or baseline comfort level  Outcome: Progressing     Problem: Safety - Medical Restraint  Goal: Remains free of injury from restraints (Restraint for Interference with Medical Device)  Description: INTERVENTIONS:  1. Determine that other, less restrictive measures have been tried or would not be effective before applying the restraint  2. Evaluate the patient's condition at the time of restraint application  3. Inform patient/family regarding the reason for restraint  4. Q2H: Monitor safety, psychosocial status, comfort, nutrition and hydration  Outcome: Progressing  Flowsheets  Taken 4/22/2024 1800 by Kayleigh Mackey RN  Remains free of injury from restraints (restraint for interference with medical device): Every 2 hours: Monitor safety, psychosocial status, comfort, nutrition and hydration  Taken 4/22/2024 1600 by Kayleigh Mackey RN  Remains free of injury from restraints (restraint for interference with medical device): Every 2 hours: Monitor safety, psychosocial status, comfort, nutrition and hydration  Taken 4/22/2024 1400 by Kayleigh Mackey RN  Remains free of injury from restraints (restraint for interference with medical device): Every 2 hours: Monitor safety, psychosocial status, comfort, nutrition and hydration  Taken 4/22/2024 1200 by Kayleigh Mackey RN  Remains free of injury from restraints (restraint for interference with medical device): Every 2 hours: Monitor safety, psychosocial status, comfort, nutrition and hydration  Taken 4/22/2024 1046 by Kayleigh Mackey RN  Remains free of injury from restraints (restraint for interference with medical device): Every 2 hours: Monitor safety, psychosocial status, comfort, nutrition and hydration     Problem: Discharge Planning  Goal: Discharge to home or other facility with appropriate resources  Outcome: Not 
  Problem: Skin/Tissue Integrity - Adult  Goal: Skin integrity remains intact  Outcome: Progressing  Flowsheets (Taken 4/28/2024 2000)  Skin Integrity Remains Intact:   Monitor for areas of redness and/or skin breakdown   Every 4-6 hours minimum: Change oxygen saturation probe site   Every 4-6 hours: If on nasal continuous positive airway pressure, respiratory therapy assesses nares and determine need for appliance change or resting period   Assess vascular access sites hourly  Goal: Incisions, wounds, or drain sites healing without S/S of infection  Outcome: Progressing  Flowsheets (Taken 4/28/2024 2000)  Incisions, Wounds, or Drain Sites Healing Without Sign and Symptoms of Infection: ADMISSION and DAILY: Assess and document risk factors for pressure ulcer development     Problem: Musculoskeletal - Adult  Goal: Return mobility to safest level of function  Outcome: Progressing  Flowsheets (Taken 4/28/2024 2000)  Return Mobility to Safest Level of Function:   Assess patient stability and activity tolerance for standing, transferring and ambulating with or without assistive devices   Assist with transfers and ambulation using safe patient handling equipment as needed   Ensure adequate protection for wounds/incisions during mobilization   Obtain physical therapy/occupational therapy consults as needed   Apply continuous passive motion per provider or physical therapy orders to increase flexion toward goal   Instruct patient/family in ordered activity level     Problem: Gastrointestinal - Adult  Goal: Maintains or returns to baseline bowel function  Outcome: Progressing  Flowsheets (Taken 4/28/2024 2000)  Maintains or returns to baseline bowel function:   Assess bowel function   Encourage oral fluids to ensure adequate hydration   Administer IV fluids as ordered to ensure adequate hydration   Administer ordered medications as needed   Encourage mobilization and activity   Nutrition consult to assist patient with 
Key, Kayleigh Alonso, RN  Remains free of injury from restraints (restraint for interference with medical device): Every 2 hours: Monitor safety, psychosocial status, comfort, nutrition and hydration  Taken 4/23/2024 1200 by Kayleigh Mackey RN  Remains free of injury from restraints (restraint for interference with medical device): Every 2 hours: Monitor safety, psychosocial status, comfort, nutrition and hydration     
  Problem: Hematologic - Adult  Goal: Maintains hematologic stability  4/29/2024 1122 by Portia Germain RN  Outcome: Progressing  4/29/2024 0800 by Anuja Hester RN  Outcome: Progressing  Flowsheets  Taken 4/29/2024 0800 by Portia Germain RN  Maintains hematologic stability: Assess for signs and symptoms of bleeding or hemorrhage  Taken 4/28/2024 2000 by Anuja Hester RN  Maintains hematologic stability:   Assess for signs and symptoms of bleeding or hemorrhage   Monitor labs for bleeding or clotting disorders   Administer blood products/factors as ordered     Problem: Pain  Goal: Verbalizes/displays adequate comfort level or baseline comfort level  4/29/2024 1122 by Portia Germain RN  Outcome: Progressing  4/29/2024 0800 by Anuja Hester RN  Outcome: Progressing     Problem: Nutrition Deficit:  Goal: Optimize nutritional status  4/29/2024 1122 by Portia Germain RN  Outcome: Progressing  4/29/2024 0800 by Anuja Hester RN  Outcome: Progressing     Problem: Decision Making  Goal: Pt/Family able to effectively weigh alternatives and participate in decision making related to treatment and care  Description: INTERVENTIONS:  1. Determine when there are differences between patient's view, family's view, and healthcare provider's view of condition  2. Facilitate patient and family articulation of goals for care  3. Help patient and family identify pros/cons of alternative solutions  4. Provide information as requested by patient/family  5. Respect patient/family right to receive or not to receive information  6. Serve as a liaison between patient and family and health care team  7. Initiate Consults from Ethics, Palliative Care or initiate Family Care Conference as is appropriate  Outcome: Progressing

## 2024-05-01 VITALS
RESPIRATION RATE: 27 BRPM | TEMPERATURE: 99.6 F | HEART RATE: 74 BPM | WEIGHT: 209.44 LBS | DIASTOLIC BLOOD PRESSURE: 57 MMHG | HEIGHT: 67 IN | OXYGEN SATURATION: 95 % | BODY MASS INDEX: 32.87 KG/M2 | SYSTOLIC BLOOD PRESSURE: 131 MMHG

## 2024-05-01 PROBLEM — K92.2 GI BLEED: Status: RESOLVED | Noted: 2024-04-16 | Resolved: 2024-05-01

## 2024-05-01 PROBLEM — K92.0 COFFEE GROUND EMESIS: Status: RESOLVED | Noted: 2024-04-21 | Resolved: 2024-05-01

## 2024-05-01 PROBLEM — Z78.9 FULL CODE STATUS: Status: RESOLVED | Noted: 2024-04-17 | Resolved: 2024-05-01

## 2024-05-01 PROBLEM — Z71.9 HEALTH EDUCATION/COUNSELING: Status: RESOLVED | Noted: 2024-04-17 | Resolved: 2024-05-01

## 2024-05-01 PROBLEM — J96.00 ACUTE RESPIRATORY FAILURE (HCC): Status: RESOLVED | Noted: 2024-04-24 | Resolved: 2024-05-01

## 2024-05-01 PROBLEM — D68.9 COAGULOPATHY (HCC): Status: RESOLVED | Noted: 2024-04-24 | Resolved: 2024-05-01

## 2024-05-01 PROBLEM — R11.2 NAUSEA AND VOMITING: Status: RESOLVED | Noted: 2024-04-21 | Resolved: 2024-05-01

## 2024-05-01 LAB
ALBUMIN SERPL-MCNC: 4.2 G/DL (ref 3.5–4.6)
ANION GAP SERPL CALCULATED.3IONS-SCNC: 18 MEQ/L (ref 9–15)
BACTERIA FLD AEROBE CULT: NORMAL
BASOPHILS # BLD: 0 K/UL (ref 0–0.2)
BASOPHILS NFR BLD: 0.1 %
BUN SERPL-MCNC: 56 MG/DL (ref 8–23)
CALCIUM SERPL-MCNC: 9.7 MG/DL (ref 8.5–9.9)
CHLORIDE SERPL-SCNC: 93 MEQ/L (ref 95–107)
CO2 SERPL-SCNC: 27 MEQ/L (ref 20–31)
CREAT SERPL-MCNC: 3.77 MG/DL (ref 0.5–0.9)
EOSINOPHIL # BLD: 0.4 K/UL (ref 0–0.7)
EOSINOPHIL NFR BLD: 1.9 %
ERYTHROCYTE [DISTWIDTH] IN BLOOD BY AUTOMATED COUNT: 18 % (ref 11.5–14.5)
GLUCOSE SERPL-MCNC: 191 MG/DL (ref 70–99)
HCT VFR BLD AUTO: 31.8 % (ref 37–47)
HGB BLD-MCNC: 10.3 G/DL (ref 12–16)
LYMPHOCYTES # BLD: 0.9 K/UL (ref 1–4.8)
LYMPHOCYTES NFR BLD: 4.9 %
MCH RBC QN AUTO: 34.1 PG (ref 27–31.3)
MCHC RBC AUTO-ENTMCNC: 32.4 % (ref 33–37)
MCV RBC AUTO: 105.3 FL (ref 79.4–94.8)
MONOCYTES # BLD: 1.4 K/UL (ref 0.2–0.8)
MONOCYTES NFR BLD: 7.8 %
NEUTROPHILS # BLD: 15.4 K/UL (ref 1.4–6.5)
NEUTS SEG NFR BLD: 84.7 %
PHOSPHATE SERPL-MCNC: 3.9 MG/DL (ref 2.3–4.8)
PLATELET # BLD AUTO: 86 K/UL (ref 130–400)
POTASSIUM SERPL-SCNC: 4.7 MEQ/L (ref 3.4–4.9)
RBC # BLD AUTO: 3.02 M/UL (ref 4.2–5.4)
SODIUM SERPL-SCNC: 138 MEQ/L (ref 135–144)
WBC # BLD AUTO: 18.2 K/UL (ref 4.8–10.8)

## 2024-05-01 PROCEDURE — 6370000000 HC RX 637 (ALT 250 FOR IP): Performed by: INTERNAL MEDICINE

## 2024-05-01 PROCEDURE — 2500000003 HC RX 250 WO HCPCS: Performed by: NURSE PRACTITIONER

## 2024-05-01 PROCEDURE — A4216 STERILE WATER/SALINE, 10 ML: HCPCS | Performed by: INTERNAL MEDICINE

## 2024-05-01 PROCEDURE — 2580000003 HC RX 258: Performed by: INTERNAL MEDICINE

## 2024-05-01 PROCEDURE — 85025 COMPLETE CBC W/AUTO DIFF WBC: CPT

## 2024-05-01 PROCEDURE — C9113 INJ PANTOPRAZOLE SODIUM, VIA: HCPCS | Performed by: INTERNAL MEDICINE

## 2024-05-01 PROCEDURE — 6360000002 HC RX W HCPCS: Performed by: INTERNAL MEDICINE

## 2024-05-01 PROCEDURE — 6370000000 HC RX 637 (ALT 250 FOR IP): Performed by: STUDENT IN AN ORGANIZED HEALTH CARE EDUCATION/TRAINING PROGRAM

## 2024-05-01 PROCEDURE — 2580000003 HC RX 258: Performed by: STUDENT IN AN ORGANIZED HEALTH CARE EDUCATION/TRAINING PROGRAM

## 2024-05-01 PROCEDURE — 80069 RENAL FUNCTION PANEL: CPT

## 2024-05-01 PROCEDURE — 99291 CRITICAL CARE FIRST HOUR: CPT | Performed by: INTERNAL MEDICINE

## 2024-05-01 PROCEDURE — 6370000000 HC RX 637 (ALT 250 FOR IP): Performed by: PHYSICIAN ASSISTANT

## 2024-05-01 PROCEDURE — 2700000000 HC OXYGEN THERAPY PER DAY

## 2024-05-01 RX ADMIN — Medication 10 ML: at 08:50

## 2024-05-01 RX ADMIN — MODAFINIL 100 MG: 200 TABLET ORAL at 08:47

## 2024-05-01 RX ADMIN — Medication 5 MCG/MIN: at 10:42

## 2024-05-01 RX ADMIN — HYDROXYZINE HYDROCHLORIDE 25 MG: 25 TABLET ORAL at 08:47

## 2024-05-01 RX ADMIN — SEVELAMER CARBONATE FOR ORAL SUSPENSION 0.8 G: 800 POWDER, FOR SUSPENSION ORAL at 08:43

## 2024-05-01 RX ADMIN — MIDODRINE HYDROCHLORIDE 20 MG: 5 TABLET ORAL at 08:43

## 2024-05-01 RX ADMIN — INSULIN LISPRO 3 UNITS: 100 INJECTION, SOLUTION INTRAVENOUS; SUBCUTANEOUS at 08:47

## 2024-05-01 RX ADMIN — SODIUM CHLORIDE, PRESERVATIVE FREE 40 MG: 5 INJECTION INTRAVENOUS at 05:51

## 2024-05-01 RX ADMIN — SODIUM CHLORIDE, PRESERVATIVE FREE 10 ML: 5 INJECTION INTRAVENOUS at 08:50

## 2024-05-01 RX ADMIN — INSULIN GLARGINE 12 UNITS: 100 INJECTION, SOLUTION SUBCUTANEOUS at 08:43

## 2024-05-01 RX ADMIN — HYDROCORTISONE 20 MG: 10 TABLET ORAL at 08:48

## 2024-05-01 RX ADMIN — ARIPIPRAZOLE 5 MG: 5 TABLET ORAL at 08:43

## 2024-05-01 ASSESSMENT — PAIN SCALES - GENERAL
PAINLEVEL_OUTOF10: 0
PAINLEVEL_OUTOF10: 0

## 2024-05-01 NOTE — DISCHARGE SUMMARY
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 received Kcentra in the ED.  INR was above 10.  Patient is on Eliquis and aspirin at home.  -Seen by GI, EGD negative. Continue IV PPI BID.  Octreotide stopped.  -Hematologist is following for coagulopathy. Believes this severe elevation is due to combination of ESRD, hepatic dysfunction, and her anticoagulant.  -No further episodes of melena, hgb remains stable, will continue to monitor with CBC in AM     Shock - multifactorial as patient has had recent bleeding, BP also runs low at baseline. Adrenal insufficiency also may be playing role  -Transferred back to ICU  for pressor requirement, remains on low dose of levophed, continuing to wean  -Management as above for hemorrhage  -Management as below for adrenal insufficiency  -Continue meropenem for one more day then transition back to suppressive amoxicillin. ID following  -On Cortef, endocrine following- steroids and insulin adjusted   -Continue midodrine 20mg TID     ESRD c/b hyperkalemia  - BUN 51, K 5.1 on arrival  - nephrology following, appreciate their assistance with HD     Abdominal distention-improved  -Status post 5 L paracentesis   -f/u peritoneal fluid analysis.  Culture remain negative.  ID is following.     Acute hypoxic respiratory failure  -Intubated for airway protection   -extubated   -Stable and doing better. Now on RA.

## 2024-05-01 NOTE — CARE COORDINATION
AFUAW met with pt at bedside. Pt is alert and oriented x4. Discussed goals of care with pt. Pt denies hospice at this time. Would like to be remain full code at this time.     If remain on LEVO, pt agreed with DC plan to LTAC. If not on LEVO, pt agreed with DC plan to [facility].     Updated Annie at [facility].     HIPOLITO will follow.     Electronically signed by HIPOLITO Rendon on 4/18/2024 at 5:31 PM    
Advance Care Planning   Healthcare Decision Maker:    Primary Decision Maker: Angelina Fagan - Child - [phone]    Click here to complete Healthcare Decision Makers including selection of the Healthcare Decision Maker Relationship (ie \"Primary\").          Electronically signed by HIPOLITO Mortensen on 4/17/24 at 4:23 PM EDT   
HIPOLITO NOTIFIED LYNDA AT [facility]  TIME 10 AM. PROVIDED REPORT NUMBER TO RN. PT AWARE OF TX TIME.     LSW WILL FOLLOW.     Electronically signed by HIPOLITO Rendon on 5/1/2024 at 8:47 AM    
Pt has plan for [facility] today. I set her up with [facility] for 10am .  Pt to go on monitor with IV Levo and they are aware. I messaged dr. Burton to let her know and she will put dc order in. I called Angelina dawson dtr to let her know time of dc set up. I left a VMM for her with time of  and to let her know. I left my number if she has any questions. She was notified yesterday that pt would be going there today.  
Quality ICU rounds completed.  No family at bedside.  Patient was receiving dialysis.  Patient was seen by Palliative Care.  Spoke with Abiodun from LTAC today and he stated that patient does not need a pre-cert.  They are ready for patient to return once she is medically stable.  Spoke with both patient and daughter Paulina, they are in agreement to return to LTAC at DC, then once DC from LTAC, go to [facility].    HIPOLITO/CM will follow.  Electronically signed by HIPOLITO Mortensen on 4/17/24 at 3:36 PM EDT    
Quality round completed with care management team. No family at bedside. Pt very quiet and on 4 L O2 at this time.   AFUAW met with pt at bedside to discuss goal of care. Pt just keep saying,\" call my daughter.\"     LSW called pt's daughter. No answer. Pall care following.   DC plan: return to LTAC ? Pallc are following. Pending medical progress.     LSW will continue to follow.     Electronically signed by HIPOLITO Rendon on 4/25/2024 at 10:38 AM    
Quality round completed with care management team. No family present during round.   Pall care following.   Pt remain on LEVO. LTAC following.    DC Plan: TBD; pend medical progress. [facility] vs return to LTAC.     LSW will continue to follow.     Electronically signed by HIPOLITO Rendon on 4/26/2024 at 10:06 AM    
Quality round completed with care management team. Pt is agreeable to LTAC if need. If LTAC is not need it when [facility].   Annie at [facility] is aware.     CM updated RAMAN at LTAC.     LSW will follow.     Electronically signed by HIPOLITO Rendon on 4/19/2024 at 10:18 AM    LSW received a call from Pt's daughter, Paulina and her . Very concerned about pt getting kicked out of the hospital.  LSW explained that hospital will not kick pt out of the hospital unless pt decides to sign out AMA herself.   Answered other questions regarding insurances.   LSW directed pt's daughter to call pt's insurance company regarding more information. LSW does not have access to pt's insurance policy and what they will pay and not pay.     LSW will follow.     Electronically signed by HIPOLITO Rendon on 4/19/2024 at 11:07 AM    
Quality round completed with care management team. Pt remain on LEVO. Currently getting HD.   LSW met with pt and pt's dtr at bedside on 4/29 around 5:20 pm. Daughter would still like [facility] (have moved to [facility]). [facility].     Checked with [facility] to see if able to accept pt at this time. Waiting for response.   Notify LIZBETH Luke manager this AM.     Referral was sent to [facility] with dtr's permission.   Arielle at [facility] is currently reviewing.     LSW will follow.     Electronically signed by HIPOLITO Rendon on 4/30/2024 at 10:37 AM    [facility] IS ABLE TO ACCEPT PT TOMORROW 5/1/2024.  NOTIFY FLAQUITO NIEVES.     WAITING FOR DR. BURGER'S APPROVAL FOR MEDICAL CLEARANCE PIROR TO NOTIFY PT'S DTR.     Electronically signed by HIPOLITO Rendon on 4/30/2024 at 2:03 PM    PER LIZBETH. DR BURGER IS GOOD WITH PT DC TOMORROW TO [facility].   NOTIFY RADHA HOWELL AND PT.   LIZBETH LUKE MANAGER WILL NOTIFY PT'S DTR.     NUMBER FOR REPORT: [id number].     Electronically signed by HIPOLITO Rendon on 4/30/2024 at 2:15 PM    
Quality round completed with care management team. Pt remain on LEVO. Currently on 4mcg LEVO. Updated pt, LTAC is currently not accepting pt at this time.   Explained Regen to pt. Pt agreed with [facility] and would like LSW to call pt's daughter.   LSW called pt's daughterAngelina. Daughter agreed with DC plan to [facility].   Referral sent to Arielle at [facility].   Pending acceptance.     LSW will follow.     Electronically signed by HIPOLITO Rendon on 4/29/2024 at 11:38 AM    
Quality round completed with care management team. Scheduled EGD and HD today. Possible intubation today?  DC plan: LTAC vs [facility]. Pall care following     Pending medical progress.     LSW will follow.     Electronically signed by HIPOLITO Rendon on 4/22/2024 at 10:25 AM    
Sent message to Dr. Burton to let her know that [facility] can take pt tomorrow. Dr. Burton did say pt can go from her standpoint and she signed WILIAN.   
Spoke to Abiodun hamm: patient going to LTAC and she would be accepted today or Sat Sun. They will not be able to take Monday as that is their move day. Pt able to go once cleared by physicians if LTAC is the plan.   
Spoke with Paulina, patient's daughter/POA. Discussed Specialty is still currently closed, but [facility] in [place] does have a bed available. Daughter is agreeable with plan to transfer to [facility] tomorrow.   Ti Gray MSN, RN Mgr Case Mgmt.    
TEAM ICU ROUNDS COMPLETE. NO FAMILY @ BEDSIDE. PATIENT AWAKE/ALERT IN BED, ON DIALYSIS. PATIENT REQUESTING PT/OT. PLAN REMAINS [facility] VS [facility].   
Team ICU rounds done at bedside and pt to be intubated and per report having EGD today. She has been NPO and still on IV Levo.Per report pt to have Maritza and further ICU care and monitoring required. Plan TBD and Pall care on consult.   
Team ICU rounds done this am at bedside. Per report pt to have Paracentesis today and weaning trial. She is also for HD today. She will have ID and Endo consults too. Pt still on IV Levo and ICU care and monitoring to continue. Her plan is TBD and Pall care following.   
Team ICU rounds done this am at bedside. Pt on vent since yesterday and will have SBT's. Pt also on IV Levo and requiring ICU care and monitoring at present. Pall care following.  
This LSW visited patient at bedside this am.   I notified patient that I spoke with Abiodun at [facility] and he is reviewing her referral for transfer back to [facility]. If patient does not require LTACH transfer, patient is requesting to have her referral sent to [facility]. Patients second choice is Counts include 234 beds at the [facility]. I have sent referral to Annie at [facility]. Awaiting a response at this time.  Electronically signed by HIPOLITO Mccann on 4/18/24 at 10:06 AM EDT    This LSW received call from Annie at [facility], per Annie patients referral has been accepted.  Patient will transfer to [facility] upon discharge. Patient will not require insurance authorization prior to transfer.  LSW/LIZBETH to follow.  Electronically signed by HIPOLITO Mccann on 4/18/24 at 10:42 AM EDT       
(Comment) (LTAC STATUS)  Who advised the patient to return to the hospital?: Other (Comment) (LTAC)  Does the patient report anything that got in the way of taking their medications?: No  In our efforts to provide the best possible care to you and others like you, can you think of anything that we could have done to help you after you left the hospital the first time, so that you might not have needed to return so soon?: Other (Comment) (UNAVOIDABLE ADMISSION DEVELOPED RECTAL BLEEDING ON ELIQUIS W MULTIPLE CO-MORBIDITIES)         Advance Directives:      Code Status: Prior   Patient's Primary Decision Maker is: Named in Scanned ACP Document    Primary Decision Maker: HermansidneyAngelina - Child - [phone]    Discharge Planning:    Patient lives with:   Type of Home: Other (Comment) (LTAC VS SNF)  Primary Care Giver: Other (Comment) (FROM [facility])  Patient Support Systems include: Children   Current Financial resources:    Current community resources: Other (Comment) (FROM [facility])  Current services prior to admission: Oxygen Therapy, Other (Comment) (FROM [facility])            Current DME:              Type of Home Care services:       ADLS  Prior functional level: Other (see comment) (FROM [facility] REQUIRES ASSITANCE W ALL ADLS)  Current functional level: Other (see comment) (FROM [facility] REQUIRES ASSITANCE W ALL ADLS)      Family can provide assistance at DC: Other (comment) (UNKNOWN)  Would you like Case Management to discuss the discharge plan with any other family members/significant others, and if so, who? Yes (HER DTR RODRIGUEZ)  Plans to Return to Present Housing: Unknown at present  Other Identified Issues/Barriers to RETURNING to current housing: CORRECT SHOCK & COAGULATION. STABALIZE PT, TRANFUSE BLOOD MONITOR LABS   Potential Assistance needed at discharge: Other (Comment) (TBD)            Potential DME:  TBD  Patient expects to discharge to: Other (comment) (SNF VS LTAC)  Plan for

## 2024-05-01 NOTE — PROGRESS NOTES
Wound Follow-up Progress Note       NAME:  Michelle Le  MEDICAL RECORD NUMBER:  [id number]  AGE:  64 y.o.   GENDER:  female  :  1960  TODAY'S DATE:  2024    Subjective:   Wound Identification:  Wound Type: traumatic  Contributing Factors: diabetes        Patient Goal of Care:  [] Wound Healing  [] Odor Control  [] Palliative Care  [] Pain Control   [] Other:     Objective:    BP (!) 98/48   Pulse 86   Temp 97.3 °F (36.3 °C) (Oral)   Resp 18   Ht 1.702 m (5' 7\")   Wt 99.3 kg (218 lb 14.7 oz)   LMP  (LMP Unknown)   SpO2 96%   BMI 34.29 kg/m²   Ozzy Risk Score: Ozzy Scale Score: 14  Assessment:   Measurements:  Wound 10/25/23 Arm Left;Lower;Posterior #1 left forearm (Active)   Wound Etiology Traumatic 24 1600   Dressing Status Clean;Dry;Intact 24 1600   Wound Cleansed Cleansed with saline 24 1600   Dressing/Treatment Dry dressing;Roll gauze;Antibacterial ointment 24 1600   Dressing Change Due 04/09/24 04/10/24 0715   Wound Assessment Pink/red 24 1600   Drainage Amount None (dry) 24 1600   Odor None 24 1600   Mely-wound Assessment Dry/flaky 24 1600   Margins Defined edges 24 1600   Wound Thickness Description not for Pressure Injury Full thickness 04/10/24 0715   Number of days: 175       Wound 10/25/23 Arm Lower;Posterior;Right #2 right forearm (Active)   Wound Etiology Traumatic 24 1600   Dressing Status Dry;Clean;Intact 24 1600   Wound Cleansed Cleansed with saline 24 1600   Dressing/Treatment Dry dressing;Roll gauze;Antibacterial ointment 24 1600   Dressing Change Due 04/09/24 04/10/24 0715   Wound Assessment Pink/red 24 1600   Drainage Amount None (dry) 24 1600   Odor None 24 1600   Mely-wound Assessment Warm 24 1600   Margins Defined edges 24 1600   Wound Thickness Description not for Pressure Injury Full thickness 04/10/24 0715   Number 
                                         Wound Follow-up Progress Note       NAME:  Michelle Le  MEDICAL RECORD NUMBER:  [id number]  AGE:  64 y.o.   GENDER:  female  :  1960  TODAY'S DATE:  2024    Subjective:   Wound Identification:  Wound Type: traumatic  Contributing Factors:  shear force         Patient Goal of Care:  [x] Wound Healing  [] Odor Control  [] Palliative Care  [] Pain Control   [] Other:     Objective:    BP (!) 131/57   Pulse 98   Temp 98 °F (36.7 °C) (Axillary)   Resp 21   Ht 1.702 m (5' 7\")   Wt 94.9 kg (209 lb 3.5 oz)   LMP  (LMP Unknown)   SpO2 96%   BMI 32.77 kg/m²   Ozzy Risk Score: Ozzy Scale Score: 13  Assessment:   Measurements:  Wound 10/25/23 Arm Left;Lower;Posterior #1 left forearm (Active)   Wound Etiology Other 24 0400   Dressing Status Clean;Dry;Intact 24 0400   Wound Cleansed Not Cleansed 24 0400   Dressing/Treatment Foam 24 0400   Dressing Change Due 24 1500   Wound Assessment Dry 24 0400   Drainage Amount None (dry) 24 0400   Odor None 24 0400   Mely-wound Assessment Dry/flaky;Ecchymosis 24 0400   Margins Defined edges 24 0400   Wound Thickness Description not for Pressure Injury Full thickness 24 0400   Number of days: 187       Wound 10/25/23 Arm Lower;Posterior;Right #2 right forearm (Active)   Wound Etiology Other 24 0400   Dressing Status Clean;Dry;Intact 24 0400   Wound Cleansed Not Cleansed 24 0400   Dressing/Treatment Foam 24 0400   Dressing Change Due 24 1500   Wound Assessment Dry 24 0400   Drainage Amount None (dry) 24 0400   Odor None 24 0400   Mely-wound Assessment Warm 24 0400   Margins Defined edges 24 0400   Wound Thickness Description not for Pressure Injury Full thickness 24 0400   Number of days: 187       Wound 23 Hand Left;Posterior (Active)   Wound Etiology Traumatic 24 
       [facility] Hospitalist Progress Note    Admitting Date and Time: 4/16/2024  8:56 AM  Admit Dx: Coagulopathy (HCC) [D68.9]  GI bleed [K92.2]  Gastrointestinal hemorrhage with melena [K92.1]    Subjective:    No acute events overnight. Patient this morning with soft BP. She reported mild lightheadedness though this is a chronic issue. She also reported persistent abdominal discomfort. Refer to significant event note regarding rapid response from this afternoon.     ROS: denies fever, chills, cp, sob, n/v, HA unless stated above.      sertraline  25 mg Oral Nightly    hydrocortisone sodium succinate PF  25 mg IntraVENous Q8H    mupirocin   Topical BID    sodium chloride  500 mL IntraVENous Once    sodium zirconium cyclosilicate  10 g Oral Once    midodrine  20 mg Oral TID AC    sodium chloride flush  5-40 mL IntraVENous 2 times per day    insulin lispro  0-16 Units SubCUTAneous Q4H    ARIPiprazole  5 mg Oral Daily    atorvastatin  40 mg Oral Nightly    sevelamer  800 mg Oral TID    hydrOXYzine HCl  25 mg Oral BID    [Held by provider] amoxicillin  500 mg Oral Daily    budesonide-formoterol  2 puff Inhalation BID RT    And    tiotropium  2 puff Inhalation Daily RT    cefTRIAXone (ROCEPHIN) IV  1,000 mg IntraVENous Q24H     bisacodyl, 5 mg, Daily PRN  chitosan, , PRN  albumin human 25%, 25 g, Daily PRN  sodium chloride flush, 5-40 mL, PRN  sodium chloride, , PRN  ondansetron, 4 mg, Q8H PRN   Or  ondansetron, 4 mg, Q6H PRN  acetaminophen, 650 mg, Q6H PRN   Or  acetaminophen, 650 mg, Q6H PRN  glucose, 4 tablet, PRN  dextrose bolus, 125 mL, PRN   Or  dextrose bolus, 250 mL, PRN  glucagon (rDNA), 1 mg, PRN  dextrose, , Continuous PRN  melatonin, 3 mg, Nightly PRN         Objective:    BP (!) 88/41   Pulse 87   Temp 97.3 °F (36.3 °C) (Oral)   Resp 20   Ht 1.702 m (5' 7\")   Wt 99.3 kg (218 lb 14.7 oz)   LMP  (LMP Unknown)   SpO2 98%   BMI 34.29 kg/m²     General Appearance: alert and oriented to person, place 
       [facility] Hospitalist Progress Note    Admitting Date and Time: 4/16/2024  8:56 AM  Admit Dx: Coagulopathy (HCC) [D68.9]  GI bleed [K92.2]  Gastrointestinal hemorrhage with melena [K92.1]    Subjective:    No acute events overnight. Patient remains on levophed. She has no symptoms to report upon questioning.     ROS: denies fever, chills, cp, sob, n/v, HA unless stated above.      hydrocortisone sodium succinate PF  50 mg IntraVENous Q6H    vancomycin (VANCOCIN) intermittent dosing (placeholder)   Other RX Placeholder    metoclopramide  10 mg IntraVENous Q6H    sertraline  25 mg Oral Nightly    mupirocin   Topical BID    midodrine  20 mg Oral TID AC    sodium chloride flush  5-40 mL IntraVENous 2 times per day    insulin lispro  0-16 Units SubCUTAneous Q4H    ARIPiprazole  5 mg Oral Daily    atorvastatin  40 mg Oral Nightly    sevelamer  800 mg Oral TID    hydrOXYzine HCl  25 mg Oral BID    [Held by provider] amoxicillin  500 mg Oral Daily    budesonide-formoterol  2 puff Inhalation BID RT    And    tiotropium  2 puff Inhalation Daily RT    cefTRIAXone (ROCEPHIN) IV  1,000 mg IntraVENous Q24H     bisacodyl, 5 mg, Daily PRN  chitosan, , PRN  albumin human 25%, 25 g, Daily PRN  sodium chloride flush, 5-40 mL, PRN  sodium chloride, , PRN  ondansetron, 4 mg, Q8H PRN   Or  ondansetron, 4 mg, Q6H PRN  acetaminophen, 650 mg, Q6H PRN   Or  acetaminophen, 650 mg, Q6H PRN  glucose, 4 tablet, PRN  dextrose bolus, 125 mL, PRN   Or  dextrose bolus, 250 mL, PRN  glucagon (rDNA), 1 mg, PRN  dextrose, , Continuous PRN  melatonin, 3 mg, Nightly PRN         Objective:    BP (!) 93/38   Pulse 78   Temp 98.5 °F (36.9 °C) (Oral)   Resp 22   Ht 1.702 m (5' 7\")   Wt 96.9 kg (213 lb 10 oz)   LMP  (LMP Unknown)   SpO2 95%   BMI 33.46 kg/m²     General Appearance: alert and oriented to person, place and time and in no acute distress  Skin: scattered ecchymoses around chest/arms  Head: normocephalic and atraumatic  Eyes: 
       [facility] Hospitalist Progress Note    Admitting Date and Time: 4/16/2024  8:56 AM  Admit Dx: Coagulopathy (HCC) [D68.9]  GI bleed [K92.2]  Gastrointestinal hemorrhage with melena [K92.1]    Subjective:    No new symptoms.  No new symptoms.       hydrocortisone sodium succinate PF  25 mg IntraVENous Q8H    insulin glargine  30 Units SubCUTAneous Nightly    insulin lispro  4 Units SubCUTAneous 4x Daily    insulin lispro  0-8 Units SubCUTAneous 4x Daily    modafinil  100 mg Oral Daily    meropenem  500 mg IntraVENous Q24H    sevelamer  0.8 g Oral TID    docusate  100 mg Oral BID    pantoprazole (PROTONIX) 40 mg in sodium chloride (PF) 0.9 % 10 mL injection  40 mg IntraVENous Q12H    chlorhexidine  15 mL Mouth/Throat BID    sodium chloride flush  5-40 mL IntraVENous 2 times per day    polyethylene glycol  17 g Oral BID    metoclopramide  10 mg IntraVENous Q6H    sertraline  25 mg Oral Nightly    mupirocin   Topical BID    midodrine  20 mg Oral TID AC    sodium chloride flush  5-40 mL IntraVENous 2 times per day    ARIPiprazole  5 mg Oral Daily    atorvastatin  40 mg Oral Nightly    hydrOXYzine HCl  25 mg Oral BID    [Held by provider] amoxicillin  500 mg Oral Daily    budesonide-formoterol  2 puff Inhalation BID RT    And    tiotropium  2 puff Inhalation Daily RT     sodium chloride flush, 5-40 mL, PRN  sodium chloride, , PRN  bisacodyl, 10 mg, Daily PRN  bisacodyl, 5 mg, Daily PRN  chitosan, , PRN  albumin human 25%, 25 g, Daily PRN  sodium chloride flush, 5-40 mL, PRN  sodium chloride, , PRN  ondansetron, 4 mg, Q8H PRN   Or  ondansetron, 4 mg, Q6H PRN  acetaminophen, 650 mg, Q6H PRN   Or  acetaminophen, 650 mg, Q6H PRN  glucose, 4 tablet, PRN  dextrose bolus, 125 mL, PRN   Or  dextrose bolus, 250 mL, PRN  glucagon (rDNA), 1 mg, PRN  dextrose, , Continuous PRN  melatonin, 3 mg, Nightly PRN         Objective:    /70   Pulse 96   Temp 98.1 °F (36.7 °C) (Axillary)   Resp 16   Ht 1.702 m (5' 7\")   Wt 
       [facility] Hospitalist Progress Note    Admitting Date and Time: 4/16/2024  8:56 AM  Admit Dx: Coagulopathy (HCC) [D68.9]  GI bleed [K92.2]  Gastrointestinal hemorrhage with melena [K92.1]    Subjective:    Overnight reports of melanotic stool. Patient this morning reports abdominal tenderness. Otherwise, no acute complaints.     ROS: denies fever, chills, cp, sob, n/v, HA unless stated above.      sodium zirconium cyclosilicate  10 g Oral Once    midodrine  20 mg Oral TID AC    sodium chloride flush  5-40 mL IntraVENous 2 times per day    insulin lispro  0-16 Units SubCUTAneous Q4H    ARIPiprazole  5 mg Oral Daily    atorvastatin  40 mg Oral Nightly    sertraline  50 mg Oral Nightly    sevelamer  800 mg Oral TID    hydrOXYzine HCl  25 mg Oral BID    [Held by provider] amoxicillin  500 mg Oral Daily    hydrocortisone sodium succinate PF  50 mg IntraVENous Q8H    budesonide-formoterol  2 puff Inhalation BID RT    And    tiotropium  2 puff Inhalation Daily RT    cefTRIAXone (ROCEPHIN) IV  1,000 mg IntraVENous Q24H     chitosan, , PRN  albumin human 25%, 25 g, Daily PRN  sodium chloride flush, 5-40 mL, PRN  sodium chloride, , PRN  ondansetron, 4 mg, Q8H PRN   Or  ondansetron, 4 mg, Q6H PRN  acetaminophen, 650 mg, Q6H PRN   Or  acetaminophen, 650 mg, Q6H PRN  glucose, 4 tablet, PRN  dextrose bolus, 125 mL, PRN   Or  dextrose bolus, 250 mL, PRN  glucagon (rDNA), 1 mg, PRN  dextrose, , Continuous PRN  melatonin, 3 mg, Nightly PRN         Objective:    BP (!) 90/40   Pulse 84   Temp 98.9 °F (37.2 °C) (Oral)   Resp 16   Wt 98.9 kg (218 lb 0.6 oz)   LMP  (LMP Unknown)   SpO2 100%   BMI 34.14 kg/m²     General Appearance: alert and oriented to person, place and time and in no acute distress  Skin: scattered ecchymoses around chest/arms  Head: normocephalic and atraumatic  Eyes: pupils equal, round, and reactive to light, extraocular eye movements intact, conjunctivae normal  Neck: neck supple and non tender 
       [facility] Hospitalist Progress Note    Admitting Date and Time: 4/16/2024  8:56 AM  Admit Dx: Coagulopathy (HCC) [D68.9]  GI bleed [K92.2]  Gastrointestinal hemorrhage with melena [K92.1]    Subjective:    No acute events overnight. Patient remains on levophed. She feels mildly lightheaded though no significant change from some of her chronic episodes of lightheadedness. Mentation is stable. Tolerating diet well     ROS: denies fever, chills, cp, sob, n/v, HA unless stated above.      insulin lispro  0-8 Units SubCUTAneous TID WC    insulin lispro  0-4 Units SubCUTAneous Nightly    hydrocortisone sodium succinate PF  50 mg IntraVENous Q6H    vancomycin (VANCOCIN) intermittent dosing (placeholder)   Other RX Placeholder    metoclopramide  10 mg IntraVENous Q6H    sertraline  25 mg Oral Nightly    mupirocin   Topical BID    midodrine  20 mg Oral TID AC    sodium chloride flush  5-40 mL IntraVENous 2 times per day    ARIPiprazole  5 mg Oral Daily    atorvastatin  40 mg Oral Nightly    sevelamer  800 mg Oral TID    hydrOXYzine HCl  25 mg Oral BID    [Held by provider] amoxicillin  500 mg Oral Daily    budesonide-formoterol  2 puff Inhalation BID RT    And    tiotropium  2 puff Inhalation Daily RT    cefTRIAXone (ROCEPHIN) IV  1,000 mg IntraVENous Q24H     bisacodyl, 10 mg, Daily PRN  bisacodyl, 5 mg, Daily PRN  chitosan, , PRN  albumin human 25%, 25 g, Daily PRN  sodium chloride flush, 5-40 mL, PRN  sodium chloride, , PRN  ondansetron, 4 mg, Q8H PRN   Or  ondansetron, 4 mg, Q6H PRN  acetaminophen, 650 mg, Q6H PRN   Or  acetaminophen, 650 mg, Q6H PRN  glucose, 4 tablet, PRN  dextrose bolus, 125 mL, PRN   Or  dextrose bolus, 250 mL, PRN  glucagon (rDNA), 1 mg, PRN  dextrose, , Continuous PRN  melatonin, 3 mg, Nightly PRN         Objective:    BP (!) 90/36   Pulse 84   Temp 97 °F (36.1 °C) (Oral)   Resp 18   Ht 1.702 m (5' 7\")   Wt 96.9 kg (213 lb 10 oz)   LMP  (LMP Unknown)   SpO2 97%   BMI 33.46 kg/m² 
       [facility] Hospitalist Progress Note    Admitting Date and Time: 4/16/2024  8:56 AM  Admit Dx: Coagulopathy (HCC) [D68.9]  GI bleed [K92.2]  Gastrointestinal hemorrhage with melena [K92.1]    Subjective:    No new symptoms.  Plan for paracentesis today.  She is on CPAP trial.  She is following commands she is off of sedation, she is on Levophed 9 mcg per minute.  Her vitals are stable.     insulin glargine  15 Units SubCUTAneous Nightly    meropenem  500 mg IntraVENous Q24H    lactulose enema   Rectal Once    docusate  100 mg Oral BID    pantoprazole (PROTONIX) 40 mg in sodium chloride (PF) 0.9 % 10 mL injection  40 mg IntraVENous Q12H    insulin lispro  0-16 Units SubCUTAneous Q4H    chlorhexidine  15 mL Mouth/Throat BID    sodium chloride flush  5-40 mL IntraVENous 2 times per day    polyethylene glycol  17 g Oral BID    hydrocortisone sodium succinate PF  50 mg IntraVENous Q6H    metoclopramide  10 mg IntraVENous Q6H    sertraline  25 mg Oral Nightly    mupirocin   Topical BID    midodrine  20 mg Oral TID AC    sodium chloride flush  5-40 mL IntraVENous 2 times per day    ARIPiprazole  5 mg Oral Daily    atorvastatin  40 mg Oral Nightly    sevelamer  800 mg Oral TID    hydrOXYzine HCl  25 mg Oral BID    [Held by provider] amoxicillin  500 mg Oral Daily    [Held by provider] budesonide-formoterol  2 puff Inhalation BID RT    And    [Held by provider] tiotropium  2 puff Inhalation Daily RT     sodium chloride flush, 5-40 mL, PRN  sodium chloride, , PRN  bisacodyl, 10 mg, Daily PRN  bisacodyl, 5 mg, Daily PRN  chitosan, , PRN  albumin human 25%, 25 g, Daily PRN  sodium chloride flush, 5-40 mL, PRN  sodium chloride, , PRN  ondansetron, 4 mg, Q8H PRN   Or  ondansetron, 4 mg, Q6H PRN  acetaminophen, 650 mg, Q6H PRN   Or  acetaminophen, 650 mg, Q6H PRN  glucose, 4 tablet, PRN  dextrose bolus, 125 mL, PRN   Or  dextrose bolus, 250 mL, PRN  glucagon (rDNA), 1 mg, PRN  dextrose, , Continuous PRN  melatonin, 3 mg, 
       [facility] Hospitalist Progress Note    Admitting Date and Time: 4/16/2024  8:56 AM  Admit Dx: Coagulopathy (HCC) [D68.9]  GI bleed [K92.2]  Gastrointestinal hemorrhage with melena [K92.1]    Subjective:    No new symptoms.       modafinil  100 mg Oral Daily    meropenem  500 mg IntraVENous Q24H    hydrocortisone sodium succinate PF  100 mg IntraVENous Q6H    insulin glargine  25 Units SubCUTAneous Nightly    insulin lispro  4 Units SubCUTAneous 4x Daily    docusate  100 mg Oral BID    pantoprazole (PROTONIX) 40 mg in sodium chloride (PF) 0.9 % 10 mL injection  40 mg IntraVENous Q12H    insulin lispro  0-16 Units SubCUTAneous Q4H    chlorhexidine  15 mL Mouth/Throat BID    sodium chloride flush  5-40 mL IntraVENous 2 times per day    polyethylene glycol  17 g Oral BID    metoclopramide  10 mg IntraVENous Q6H    sertraline  25 mg Oral Nightly    mupirocin   Topical BID    midodrine  20 mg Oral TID AC    sodium chloride flush  5-40 mL IntraVENous 2 times per day    ARIPiprazole  5 mg Oral Daily    atorvastatin  40 mg Oral Nightly    sevelamer  800 mg Oral TID    hydrOXYzine HCl  25 mg Oral BID    [Held by provider] amoxicillin  500 mg Oral Daily    [Held by provider] budesonide-formoterol  2 puff Inhalation BID RT    And    [Held by provider] tiotropium  2 puff Inhalation Daily RT     sodium chloride flush, 5-40 mL, PRN  sodium chloride, , PRN  bisacodyl, 10 mg, Daily PRN  bisacodyl, 5 mg, Daily PRN  chitosan, , PRN  albumin human 25%, 25 g, Daily PRN  sodium chloride flush, 5-40 mL, PRN  sodium chloride, , PRN  ondansetron, 4 mg, Q8H PRN   Or  ondansetron, 4 mg, Q6H PRN  acetaminophen, 650 mg, Q6H PRN   Or  acetaminophen, 650 mg, Q6H PRN  glucose, 4 tablet, PRN  dextrose bolus, 125 mL, PRN   Or  dextrose bolus, 250 mL, PRN  glucagon (rDNA), 1 mg, PRN  dextrose, , Continuous PRN  melatonin, 3 mg, Nightly PRN         Objective:    BP (!) 113/47   Pulse 87   Temp 97.5 °F (36.4 °C) (Oral)   Resp 16   Ht 
       [facility] Hospitalist Progress Note    Admitting Date and Time: 4/16/2024  8:56 AM  Admit Dx: Coagulopathy (HCC) [D68.9]  GI bleed [K92.2]  Gastrointestinal hemorrhage with melena [K92.1]    Subjective:    No acute events overnight. Patient this morning slightly more lethargic, though remained AAOx3. She remains on pressors. I was told by RN during my evaluation that providers were preparing for intubation out of concerns for mental status and tachypnea. ABG reviewed showing low pCO2. Patient confirmed that she is ok with intubation    ROS: denies fever, chills, cp, sob, n/v, HA unless stated above.      phenylephrine        sodium chloride (PF)        vancomycin  15 mg/kg IntraVENous Once    insulin lispro  0-16 Units SubCUTAneous Q4H    meropenem  1,000 mg IntraVENous Once    Followed by    meropenem  1,000 mg IntraVENous Q12H    polyethylene glycol  17 g Oral BID    hydrocortisone sodium succinate PF  50 mg IntraVENous Q6H    vancomycin (VANCOCIN) intermittent dosing (placeholder)   Other RX Placeholder    metoclopramide  10 mg IntraVENous Q6H    sertraline  25 mg Oral Nightly    mupirocin   Topical BID    midodrine  20 mg Oral TID AC    sodium chloride flush  5-40 mL IntraVENous 2 times per day    ARIPiprazole  5 mg Oral Daily    atorvastatin  40 mg Oral Nightly    sevelamer  800 mg Oral TID    hydrOXYzine HCl  25 mg Oral BID    [Held by provider] amoxicillin  500 mg Oral Daily    budesonide-formoterol  2 puff Inhalation BID RT    And    tiotropium  2 puff Inhalation Daily RT     phenylephrine, ,   sodium chloride (PF), ,   bisacodyl, 10 mg, Daily PRN  bisacodyl, 5 mg, Daily PRN  chitosan, , PRN  albumin human 25%, 25 g, Daily PRN  sodium chloride flush, 5-40 mL, PRN  sodium chloride, , PRN  ondansetron, 4 mg, Q8H PRN   Or  ondansetron, 4 mg, Q6H PRN  acetaminophen, 650 mg, Q6H PRN   Or  acetaminophen, 650 mg, Q6H PRN  glucose, 4 tablet, PRN  dextrose bolus, 125 mL, PRN   Or  dextrose bolus, 250 mL, 
       [facility] Hospitalist Progress Note    Admitting Date and Time: 4/16/2024  8:56 AM  Admit Dx: Coagulopathy (HCC) [D68.9]  GI bleed [K92.2]  Gastrointestinal hemorrhage with melena [K92.1]    Subjective:  No acute events overnight. Pt resting comfortably in bed. Denies any acute complaints at this time including chest pain or shortness of breath. Per bedside RN, pt had 2 episodes of dark, tarry stools today and heme occult was positive.      [START ON 4/30/2024] polyethylene glycol  17 g Oral Daily    insulin lispro  0-16 Units SubCUTAneous Q4H    insulin glargine  36 Units SubCUTAneous Nightly    hydrocortisone sodium succinate PF  25 mg IntraVENous Q8H    modafinil  100 mg Oral Daily    meropenem  500 mg IntraVENous Q24H    sevelamer  0.8 g Oral TID    docusate  100 mg Oral BID    pantoprazole (PROTONIX) 40 mg in sodium chloride (PF) 0.9 % 10 mL injection  40 mg IntraVENous Q12H    chlorhexidine  15 mL Mouth/Throat BID    sodium chloride flush  5-40 mL IntraVENous 2 times per day    sertraline  25 mg Oral Nightly    mupirocin   Topical BID    midodrine  20 mg Oral TID AC    sodium chloride flush  5-40 mL IntraVENous 2 times per day    ARIPiprazole  5 mg Oral Daily    atorvastatin  40 mg Oral Nightly    hydrOXYzine HCl  25 mg Oral BID    [Held by provider] amoxicillin  500 mg Oral Daily    budesonide-formoterol  2 puff Inhalation BID RT    And    tiotropium  2 puff Inhalation Daily RT     sodium chloride flush, 5-40 mL, PRN  sodium chloride, , PRN  bisacodyl, 10 mg, Daily PRN  bisacodyl, 5 mg, Daily PRN  chitosan, , PRN  albumin human 25%, 25 g, Daily PRN  sodium chloride flush, 5-40 mL, PRN  sodium chloride, , PRN  ondansetron, 4 mg, Q8H PRN   Or  ondansetron, 4 mg, Q6H PRN  acetaminophen, 650 mg, Q6H PRN   Or  acetaminophen, 650 mg, Q6H PRN  glucose, 4 tablet, PRN  dextrose bolus, 125 mL, PRN   Or  dextrose bolus, 250 mL, PRN  glucagon (rDNA), 1 mg, PRN  dextrose, , Continuous PRN  melatonin, 3 mg, 
     Nutrition Note    Pt remains intubated. Gastroparesis notes, on reglan 4 times daily. OK to start trophic TF per discussion in rounds     Current Tube Feeding (TF) Orders:   Feeding Route: Orogastric  Formula: Peptide Based (Vital 1.2)  Schedule: Continuous  Additives/Modulars: None  Water Flushes: 30 ml q 6 hrs (180 ml)  Current TF & Flush Orders Provides: 576 kcal, 36 gm protein ~570 ml free water  Goal TF & Flush Orders Provides: 576 kcal, 36 gm protein ~ 570 ml free water      Electronically signed by Kalyn Baker RD, LD on 4/23/24 at 1:27 PM EDT        
  Critical Care Progress Note    2024 10:19 AM    Subjective:   Admit Date: 2024  PCP: Adilene Terry MD    Chief Complaint   Patient presents with    Rectal Bleeding     X1 week      Interval History: Continues to be in the ICU.  Blood pressure is marginal.  Intermittently Levophed.  Currently is on hemodialysis.  Mental status is about the same.  Had some nausea this morning.      Medications:   Scheduled Meds:   sertraline  25 mg Oral Nightly    mupirocin   Topical BID    hydrocortisone sodium succinate PF  50 mg IntraVENous Q8H    midodrine  20 mg Oral TID AC    sodium chloride flush  5-40 mL IntraVENous 2 times per day    insulin lispro  0-16 Units SubCUTAneous Q4H    ARIPiprazole  5 mg Oral Daily    atorvastatin  40 mg Oral Nightly    sevelamer  800 mg Oral TID    hydrOXYzine HCl  25 mg Oral BID    [Held by provider] amoxicillin  500 mg Oral Daily    budesonide-formoterol  2 puff Inhalation BID RT    And    tiotropium  2 puff Inhalation Daily RT    cefTRIAXone (ROCEPHIN) IV  1,000 mg IntraVENous Q24H     Continuous Infusions:   norepinephrine 5 mcg/min (24)    sodium chloride      dextrose      pantoprazole (PROTONIX) 80 mg in sodium chloride 0.9 % 100 mL infusion 8 mg/hr (24)    octreotide (SANDOSTATIN) 500 mcg in sodium chloride 0.9 % 100 mL infusion 25 mcg/hr (24)         Objective:   Vitals:   Temp (24hrs), Av.8 °F (36.6 °C), Min:97.3 °F (36.3 °C), Max:98.4 °F (36.9 °C)    BP (!) 109/42   Pulse 88   Temp 98 °F (36.7 °C) (Oral)   Resp 24   Ht 1.702 m (5' 7\")   Wt 96.9 kg (213 lb 10 oz)   LMP  (LMP Unknown)   SpO2 (!) 84%   BMI 33.46 kg/m²   I/O:24HR INTAKE/OUTPUT:    Intake/Output Summary (Last 24 hours) at 2024 1019  Last data filed at 2024 0952  Gross per 24 hour   Intake 1215.74 ml   Output --   Net 1215.74 ml              Physical Exam:    General appearance - alert, ill appearing  Mental status - alert, mildly encephalopathic  Eyes - 
  Critical Care Progress Note    2024 10:58 PM    Subjective:   Admit Date: 2024  PCP: Adilene Terry MD    Chief Complaint   Patient presents with    Rectal Bleeding     X1 week      Interval History: 2 rapid response called today due to hypotension.  MAP has been running between 40s and 50s.  No change in mental status.  Transferred to the ICU for Levophed.      Medications:   Scheduled Meds:   sertraline  25 mg Oral Nightly    mupirocin   Topical BID    hydrocortisone sodium succinate PF  50 mg IntraVENous Q8H    sodium zirconium cyclosilicate  10 g Oral Once    midodrine  20 mg Oral TID AC    sodium chloride flush  5-40 mL IntraVENous 2 times per day    insulin lispro  0-16 Units SubCUTAneous Q4H    ARIPiprazole  5 mg Oral Daily    atorvastatin  40 mg Oral Nightly    sevelamer  800 mg Oral TID    hydrOXYzine HCl  25 mg Oral BID    [Held by provider] amoxicillin  500 mg Oral Daily    budesonide-formoterol  2 puff Inhalation BID RT    And    tiotropium  2 puff Inhalation Daily RT    cefTRIAXone (ROCEPHIN) IV  1,000 mg IntraVENous Q24H     Continuous Infusions:   norepinephrine      sodium chloride      dextrose      pantoprazole (PROTONIX) 80 mg in sodium chloride 0.9 % 100 mL infusion 8 mg/hr (24)    octreotide (SANDOSTATIN) 500 mcg in sodium chloride 0.9 % 100 mL infusion 25 mcg/hr (24)         Objective:   Vitals:   Temp (24hrs), Av.3 °F (36.3 °C), Min:97.3 °F (36.3 °C), Max:97.5 °F (36.4 °C)    BP (!) 91/33   Pulse 86   Temp 97.5 °F (36.4 °C) (Oral)   Resp 14   Ht 1.702 m (5' 7\")   Wt 99.3 kg (218 lb 14.7 oz)   LMP  (LMP Unknown)   SpO2 96%   BMI 34.29 kg/m²   I/O:24HR INTAKE/OUTPUT:    Intake/Output Summary (Last 24 hours) at 2024 2258  Last data filed at 2024 1746  Gross per 24 hour   Intake 414.97 ml   Output 150 ml   Net 264.97 ml            Physical Exam:        BMP:    Recent Labs     24  0310 24  1218 24  0546   * 135 137 
  HEMODIALYSIS PRE-TREATMENT NOTE    Patient Identifiers prior to treatment: Name,     Isolation Required: No                      Isolation Type: N/A       (please document if patient is being managed as a PUI/COVID-19 patient)        Hepatitis status:                           Date Drawn                             Result  Hepatitis B Surface Antigen 24     Neg                     Hepatitis B Surface Antibody 23 Immune        Hepatitis B Core Antibody N/A N/A          How was Hepatitis Status verified: Epic     Was a copy of the labs you documented provided to facility for the patient's chart: Epic    Hemodialysis orders verified: Yes, with Dr. Finch    Access Within normal limits ( I.e. s/s of infection,...): WNL, avis, regla present     Pre-Assessment completed: Yes    Pre-dialysis report received from: FLAQUITO Chavira                      Time: 08:55    
  HEMODIALYSIS PRE-TREATMENT NOTE    Patient Identifiers prior to treatment: Name,     Isolation Required: No                      Isolation Type: No       (please document if patient is being managed as a PUI/COVID-19 patient)        Hepatitis status: Immune                          Date Drawn                             Result  Hepatitis B Surface Antigen 24 Neg        Hepatitis B Surface Antibody 23 Immune        Hepatitis B Core Antibody N/A N/A          How was Hepatitis Status verified: Epic     Was a copy of the labs you documented provided to facility for the patient's chart: Epic    Hemodialysis orders verified: Yes, orders reviewed with Dr. Puentes    Access Within normal limits ( I.e. s/s of infection,...): WNL, bruit, heribertoill present     Pre-Assessment completed: Yes    Pre-dialysis report received from: FLAQUITO Victor                      Time: 08:15    
  HISTORY OF PRESENT ILLNESS:         This is a 64 y.o. female with past medical history of end-stage renal disease on hemodialysis diabetes mellitus type 2 coronary artery disease COPD atrial fibrillation on chronic anticoagulation with Eliquis, well-known to my service from frequent hospitalization, [place] [facility] [place]ochezia, grossly bloody bowel movements, coffee-ground emesis, highly elevated INR of greater than 10, hypotension and intermittent need for Levophed.  Patient was hospitalized recently at [facility] ICU from March 29 to April 12 for fluid overload, cardiac arrest.  Patient has chronic osteomyelitis of left knee and is on chronic suppressive therapy with amoxicillin.   Currently on IV vancomycin and meropenem with worsening leukocytosis  Patient has no fevers  Currently on Levophed low-dose, getting weaned off  Patient was intubated for acute respiratory failure, currently extubated.     CT of abdomen and pelvis during this admission with large ascites, nodular liver manage, cardiomegaly with small effusions    Current Medications:     modafinil  100 mg Oral Daily    meropenem  500 mg IntraVENous Q24H    hydrocortisone sodium succinate PF  100 mg IntraVENous Q6H    insulin glargine  25 Units SubCUTAneous Nightly    insulin lispro  4 Units SubCUTAneous 4x Daily    docusate  100 mg Oral BID    pantoprazole (PROTONIX) 40 mg in sodium chloride (PF) 0.9 % 10 mL injection  40 mg IntraVENous Q12H    insulin lispro  0-16 Units SubCUTAneous Q4H    chlorhexidine  15 mL Mouth/Throat BID    sodium chloride flush  5-40 mL IntraVENous 2 times per day    polyethylene glycol  17 g Oral BID    metoclopramide  10 mg IntraVENous Q6H    sertraline  25 mg Oral Nightly    mupirocin   Topical BID    midodrine  20 mg Oral TID AC    sodium chloride flush  5-40 mL IntraVENous 2 times per day    ARIPiprazole  5 mg Oral Daily    atorvastatin  40 mg Oral Nightly    sevelamer  800 mg Oral TID    
  HISTORY OF PRESENT ILLNESS:         This is a 64 y.o. female with past medical history of end-stage renal disease on hemodialysis diabetes mellitus type 2 coronary artery disease COPD atrial fibrillation on chronic anticoagulation with Eliquis, well-known to my service from frequent hospitalization, was transferred from [facility] on April 16 with hematochezia, grossly bloody bowel movements, coffee-ground emesis, highly elevated INR of greater than 10, hypotension and intermittent need for Levophed.  Patient was hospitalized recently at [facility] ICU from March 29 to April 12 for fluid overload, cardiac arrest.  Patient has chronic osteomyelitis of left knee and is on chronic suppressive therapy with amoxicillin.   Currently on IV vancomycin and meropenem with worsening le  Patient has no fevers  Currently on Levophed low-dose, g  Patient was intubated for acute respiratory failure, currently extubated.     CT of abdomen and pelvis during this admission with large ascites, nodular liver manage, cardiomegaly with small effusions  Physical Exam:  General Appearance: Lethargic, opens eyes briefly to verbal stimulation, in no acute distress  On 4 L nasal cannula  Skin: Extensive extremities ecchymosis  Left hand full-thickness skin ulcer, no drainage or surrounding erythema  Head: normocephalic and atraumatic  Eyes: conjunctivae normal, anicteric sclerae  ENT:  normal mucous membranes. No thrush  Lungs: normal respiratory effort, no rhonchi, no crackles, no wheezes  Heart: nl S1/S2, no murmur.  Distant heart sounds  Abdomen: Distended with large volume ascites  NEUROLOGICAL: Opens eyes to verbal stimulation but does not follow significant commands  Positive generalized edema, 2-3+  No erythema, no warmth  Left knee with healed incision.  No erythema warmth or swelling  Intact right chest Vas-Cath     DATA:           DATA:    Lab Results   Component Value Date    WBC 21.7 (H) 04/26/2024    HGB 10.5 (L) 04/26/2024    HCT 32.1 
  Pharmacy Vancomycin Consult     Current Dosing: HD dosin post HD      Recent Labs     24  0600 24  0600 24  0820   BUN 61* 77*  --    CREATININE 5.23* 5.92* 6.0*   WBC 15.5* 17.1*  --        Intake/Output Summary (Last 24 hours) at 2024 0846  Last data filed at 2024 0611  Gross per 24 hour   Intake 2558.04 ml   Output --   Net 2558.04 ml     Cultures  Recent Labs     24  0824 24  1106   BC No Growth to date.  Any change in status will be called.  --    BLOODCULT2 No Growth to date.  Any change in status will be called.  --    COVID19  --  Not Detected     Height: 170.2 cm (5' 7\"), Weight - Scale: 96.9 kg (213 lb 10 oz), Body mass index is 33.46 kg/m².    Estimated Creatinine Clearance: 11 mL/min (A) (based on SCr of 6 mg/dL (HH)).Hemodialysis Intake (ml): 500 mlDialyzer Clearance: Lightly streaked.    Trough:  Recent Labs     24  0600   VANCORANDOM 21.0      Assessment/Plan:  Pre-HD level therapeutic for goal. Will give maintenance dose of 1500 x1 post HD today. Given unclear HD schedule will draw level tomorrow am in case of HD Tuesday per notes. Timing of future trough levels may be adjusted based on culture results, renal function, and clinical response.    Thank you,  Leti Freeman, [facility] PharmD  
0700AM Report from Yanci HUDDLESTON. Alert and oreinted. VSS MP-AF Levo and Protonix infusing via gomez catheter. Dressing changed on Gomez catheter, site without redness or swelling. No complaints  07:30am DR Marsh in and updated on status  08:40am Dilaysis here for dialysis  0900am Critical care rounds with Dr Marsh and team. Dialysis not started at this time per Dr Marsh. Daughter called to obtain consents for procedures (EGD, ArtLIne intubation).  10:22am Intubated per Milagros NP. Sedated with Propofol and Fentanyl. Art LIne inserted to R radial per Milagros NP, Tolerated well.   11:29am Hemosphere on  12 noon Remains vented and sedated. Dialysis inprogress  15;15pm EGD per DR Ron. NO issues during procedure. VSS remained stable. Sedation increased per protocol for procedure.  MP-SR                         
0700am Report from Mariana HUDDLESTON. Alert and oriented. BP in the 90's afebrile. Octreotide and protonix infusing via gomez R subclavian. AV fistula L upper arm. +thrill bruit. Dressing to L arm dry and intact. Bruising generalized. Anuric. For dialysis today  0800am DR Puentes in and updated on status  08:30am Dilaysis here for dialysis  0900am Critical care rounds with Dr Almazan and team. Dialysis in progress. Ronna KAMARA for GI in to see patient and updated on status  10:50am Daughter called and updated via phone. Daughter transferred to [facility]   11:45am Ronna KAMARA GI called and udpated on status.orders  12:30pm Dilaysis finished. Tolerated fair  
0700am Report from Marixa HUDDLESTON. Vented and sedated. Hemisphere. MP-SR.  Anuric. Propofol Fentanyl Levo infusing via double CVC R chest. Bruising all over. No complaints.  0900am Critical care rounds with Dr Salma Linares NP and team  Propofol stopped.   10:40am Fentanyl off. Able to follow commands and open eyes on command.   12:30pm 02 sat down to 55% suctioned patient and sats increased to %. No resp distress noted  1400pm Daughter updated on status  1600pm Remains off sedation. Vented hemisphere. VSS levo 5 meqs  16:30pm Dr Marsh updated on status  
0800 Am assessment completed as noted. Pt follows commands and nods appropriately. Vss, will continue to monitor. Electronically signed by Ashley Benjamin RN on 4/24/2024 at 2:49 PM  0907 Pt on cpap for sbt.  1110 Pt given Lactulose enema. Pt had small amount of soft brown formed bloody stool that was bright. Milagros Hanna notified.  1240 Pt extubated to 4 liters nc. Vss, will continue to monitor. Electronically signed by Ashley Benjamin RN on 4/24/2024 at 2:50 PM  1300 Pt arouses when spoken to and speaking and following commands. Vss, will continue to monitor. Daughter at bedside visiting. Pt cleaned up of incontinent small amount of gel like liquid with blood. Milagros Hanna notified.  1445 Pt to specials via bed. Electronically signed by Ashley Benjamin RN on 4/24/2024 at 2:51 PM    
0800 am assessment completed as noted. Vss, will continue to monitor.   0900 Diaysis at bedside for treatment. Electronically signed by Ashley Benjamin RN on 4/30/2024 at 11:13 AM    
1000 - Shift assessment complete. A&Ox4. Patient is calm and cooperative. Denies feeling SOB on exertion. Complaining of generalized abdominal pain 6/10. No nausea present. Lung sounds are diminished. On room air. SR on tele. Abdomen is rotund, distended. Bowel sounds are present. Patient states she has not had a BM in 2 days: PRN bisacodyl ordered and given. Patient passing gas. NPO (ICE chips, sips okay). Medications given per MAR. Skin intact. Bruising to BUE, abdomen, Feet. Abrasions to elbows, redness to skin folds. Patient bathed and repositioned. Linen changed. Mepilex on heels. Fistula to LUE. CVC line intact: Protonix and Sandostatin infusing per order. Bed in lowest position. Call light in reach. No needs at this time. Care ongoing.    1230 - Rapid response called for BP 84/36. Team and attending at bedside. 500 cc bolus ordered and given  - BP 88/41     Bolus completed. BP 93/43. Attending notified in person     1330 - Dr Almazan to call attending regarding ICU bed placement       1615 - Rapid response called for blood pressure 80/34, 90/33. Team at bedside. Dr Burton and Gurmeet at bedside  - Transfer to ICU order placed  - Levo ordered and initiated.  - [place] placed on patient.     Electronically signed by Rogerio Riley RN on 4/18/2024 at 5:08 PM      
12:33pm Received from ER via cart to CCU 10. Alert and oriented. VSS MP-AF. L AV Fistula +thrill and bruit.  Wound to L arm wrapped in cling.  Kenyon Catherer to R chest. Dual lumen. Both port flushed and patent. Skin Check with Ashley HUDDLESTON Bruising all over. Meplex on coccyx and heels.  Anuric. Dr Puentes in and updated on patient  14:50pm Vero NP for GI in to see patient. Informed about patient having bloody stool small amount. Medium red in color  1600pm Dr Guerrero in and updated on status. Messaged Dr Arteaga to update and given lab results. No orders  1700pm Small dark red emesis noted  1800pm Resting with eyes closed. No complaints. Sandostatin and protonix gtts infusing via Kenyon catheter. anuric  
1900:  Assumed care of pt at RN shift change after receiving bedside report from FLAQUITO Chavira. Pt in no acute distress at this time, side rails up x 2, call light within reach, and white board updated. Pt with no immediate questions or concerns at this time    2030: assessment completed, see flow sheet. Pt followed some commands but not all. She has moderate dysarthria with delayed responses. Pt does tend to call out and is frequently reminded to utilize call light which is within reach. She opens her eyes to speech, she is lethargic and weak. Pt is alert to self and place. She did stop participating jail through assessment. Pt still with Levophed infusing, Tube feed increased per order   2200: Pt in bed, eyes closed. Call light and pt cell phone with pt.   0000: reassessment completed, no changes at this time, pt remains lethargic. Daughter, Paulina, called for update.   0200: message to provider regarding accucheck orders and no orders for insulin administration. Levophed turned down to 5 mcg. New tube feeding tubing hung and started   0400: no change on pt reassessment at this time. Pt given CHG bath, complete bed linen change and gown change.   0700: bedside report given to FLAQUITO Chavira   
19:00-07:30 Shift summary:     Assessments completed (see flowsheets). Pt A&Ox4, able to make needs and wants known. Positive thrill and bruit noted to LUE AV fistula. Right subclavian double lumen Kenyon remains in place, dressing C/D/I, no s/s of infection noted. Lumens patent with positive blood return noted.  IV drips infusing per orders (see eMAR), pt tolerating well. Levophed restarted and titrated off per orders (see eMAR). Pt took PO medications whole with thin liquids w/o difficulty.  Pt c/o nausea PRN medication administered per MD order and pt request with positive effect. Labs drawn, labeled and sent to lab. Bed bath and complete linen change provided, pt tolerated well.  Bedside handoff report given to on coming RN. Safety measures in place.       
2250: Report received on patient.     2300: H&H collected and sent.    0400: Patient reassessed at this time.    0445: US IV placed into R AC. 22G IV removed from R shoulder.     0630: OG placed. Placement verified by ausculation with 2 RN's.     0700: Bedside report given to Kayleigh HUDDLESTON.   
Attempted cpap trial, patient's tidal volumes never reached 100 mL.  Will give her a little more time to wake up and attempt again.    
Attempted to call [facility] with two different numbers and both numbers did not work, had the  dial them as well and still did not work. SW is having [facility] call [facility] for report.     Patient bathed before going to [facility] and all linens/ gown changed.   
BRIEF PHARMACY NOTE:   Interdisciplinary Rounds Attended    Recommendations/changes made today by Pharmacy:   Initiated Colace per verbal  Follow up ID consult for Merrem per policy     Additional information:   DVT px: None; GIB  SUP: Protonix drip - follow up de escalation  Steroids: Solu-cortef 50 mg q6h  Insulin coverage: HD SSI with Humalog, utilized 26 units per sliding scale   Pressors: Levo, midodrine  Sedation: Propofol, Fentanyl  Antimicrobial therapy:   Vancomycin, currently on day # 4   Merrem, currently on day # 2       Thank you,    Mariela Manley, PharmD  PGY1 Pharmacy Resident  4/23/2024 10:10 AM        
BRIEF PHARMACY NOTE:   Interdisciplinary Rounds Attended    Recommendations/changes made today by Pharmacy:   Orders placed per verbal on rounds  Changed to HDSSI q4h  Follow up with ID: alternative antibiotic in the setting of thrombocytopenia  PSM sent to ID provider     Additional information:   DVT px: None; GIB  SUP: Protonix  Steroids: Solu-cortef 100 mg q6h  Insulin coverage: MD SSI with Humalog, utilized 52 units per sliding scale. Lantus 14 units. Humalog 36 units HS  Pressors: Levophed, midodrine 20 TID  Antimicrobial therapy: Merrem, currently on day # 7. ID on consult-Cultures no growth      Thank you,  Mariela Manley, PharmD  PGY1 Pharmacy Resident  4/29/2024 11:22 AM        
BRIEF PHARMACY NOTE:   Interdisciplinary Rounds Attended    Recommendations/changes made today by Pharmacy:   Orders placed per verbal on rounds  Changed to HDSSI to AC and HS     Additional information:   DVT px: None; GIB  SUP: Protonix  Steroids: hydrocortisone 20 mg po BID  Insulin coverage: HDSSI with Humalog. Humalog 6 units TID WC. Lantus 40 units HS.   Pressors: Levophed, midodrine 20 TID  Antimicrobial therapy: Merrem, currently on day #8. ID on consult-Cultures no growth      Thank you,  Mariela Manley, PharmD  PGY1 Pharmacy Resident  4/30/2024 11:23 AM    
BRIEF PHARMACY NOTE:   Interdisciplinary Rounds Attended    Recommendations/changes made today by Pharmacy:   Orders placed per verbal on rounds  Discontinued vancomycin  Initiated Lantus 15 units nightly  Follow up steroid LOT     Additional information:   DVT px: None; GIB  SUP: Protonix  Steroids: Solu-cortef 50 mg q6h  Insulin coverage: HD SSI with Humalog, utilized 26 units per sliding scale   Pressors: Levo, midodrine  Antimicrobial therapy: Merrem, currently on day # 3       Thank you,    Mariela Manley, PharmD  PGY1 Pharmacy Resident  4/24/2024 10:06 AM            
BRIEF PHARMACY NOTE:   Interdisciplinary Rounds Attended    Recommendations/changes made today by Pharmacy:   Orders placed per verbal on rounds  Provigil 100mg daily   Follow up BG and need for additional lantus   Follow up ID-merrem re-order per protocol     Additional information:   DVT px: None; GIB  SUP: Protonix Q12  Steroids: Solu-cortef 100 mg q6h  Insulin coverage: HD SSI with Humalog, utilized 36 units per sliding scale . Lantus 25 units. Insulin 4 units QID per endrocrine  Pressors: Levo @ 5, midodrine 20 TID  Antimicrobial therapy: Merrem, currently on day # 4. ID on consult-notation to continue merrem-will call for re order.  Cultures no growth      Thank you,    Leti Freeman, [facility] PharmD              
BRIEF PHARMACY NOTE:   Interdisciplinary Rounds Attended    Recommendations/changes made today by Pharmacy:   Orders placed per verbal on rounds  Unheld inhalers  Follow up BG (Endocrine following)   Follow up merrem LOT-reordered by ID      Additional information:   DVT px: None; GIB  SUP: Protonix Q12  Steroids: Solu-cortef 100 mg q6h  Insulin coverage: HD SSI with Humalog, utilized 4 units per sliding scale. Lantus 14 units. Humalog 4 6h  Pressors: Levo @ 4, midodrine 20 TID  Antimicrobial therapy: Merrem, currently on day # 5. ID on consult-Cultures no growth      Thank you,    Leti Freeman, [facility] PharmD    
Comprehensive Nutrition Assessment    Type and Reason for Visit:  Initial, NPO/Clear Liquid (mechanical ventilation)    Nutrition Recommendations/Plan:   Recommend start TPN  2 in 1 custom TPN (no lipids)   95 gm amino acids, 150 gm dextrose in 1000 ml (42 ml/hr)   This will provide 889 kcal (+ 306 kcal from current rate of propofol) and 95 gm protein      Malnutrition Assessment:  Malnutrition Status:  At risk for malnutrition (Comment) (04/22/24 1148)    Context:  Chronic Illness     Findings of the 6 clinical characteristics of malnutrition:  Energy Intake:  Mild decrease in energy intake (Comment) (6 days NPO)  Weight Loss:  Unable to assess (significant edema noted)     Body Fat Loss:  No significant body fat loss     Muscle Mass Loss:  No significant muscle mass loss    Fluid Accumulation:  Unable to assess     Strength:  Not Performed    Nutrition Assessment:    Pt is at risk for malnutrition, has been NPO since admission (6 days) due to GIB.  Intubated this morning, .  recommend start TPN, recommendations provided    Nutrition Related Findings:    PMHx of schizophrenia, CAD ,hyperlipidemia, GERD, obesity, neuropathy, COPD, type II DM, ESRD on dialysis. Recently discharged from here on 4/12. Presented to the emergency department for evaluation of rectal bleeding.  Intubated 4/22.  Labs:  hyponatremia, hyperkalemia, elevated BUN/Cr, elevated lactate, glucose > 300, meds include reglan, glycolax, octreotide. Propofol @ 11.6 ml/hr, BM (4/21). trace-3_ gen edema noted, CVC line Wound Type: Multiple, Pressure Injury (coccyx)       Current Nutrition Intake & Therapies:    Average Meal Intake: NPO  Average Supplements Intake: NPO  Diet NPO Exceptions are: Ice Chips, Sips of Water with Meds    Anthropometric Measures:  Height: 170.2 cm (5' 7\")  Ideal Body Weight (IBW): 135 lbs (61 kg)    Admission Body Weight: 98 kg (216 lb) (bed scale)  Current Body Weight: 96.6 kg (213 lb) (4/19),  Weight Source: Bed 
Comprehensive Nutrition Assessment    Type and Reason for Visit:  Reassess    Nutrition Recommendations/Plan:   Continue diet dysphagia pureed with mildly thick liquids   Recommend remove NGT if po remains > 50% today   Begin a thickened oral nutrition supplement BID      Malnutrition Assessment:  Malnutrition Status:  At risk for malnutrition (Comment) (04/22/24 1148)    Context:  Chronic Illness     Findings of the 6 clinical characteristics of malnutrition:  Energy Intake:  Mild decrease in energy intake (Comment) (6 days NPO)  Weight Loss:  Unable to assess (significant edema noted)     Body Fat Loss:  No significant body fat loss     Muscle Mass Loss:  No significant muscle mass loss    Fluid Accumulation:  Unable to assess     Strength:  Not Performed    Nutrition Assessment:    Pt passed her BSE on 4/26 and a dysphagia diet started.  PO intakes have been fair to good per discussion with RN.  TF has been on hold since yesterday to allow for po intake, recommend remove NGT and discontinue TF if po intakes continues to be adequate today.  Will continue to monitor    Nutrition Related Findings:    PMHx includes : of schizophrenia, CAD , GERD,COPD, type II DM, ESRD on dialysis ( anuric) , gastoparesis admitted for rectal bleeding. Intubated 4/22-4/24 with trophic rate TF. Labs acceptable for ESRD, glucose variable ( 116-240), Meds include reglan, glycolax, renvela + BM's, significant generalized edema per nursing, BSE 4/26=dysphagia pureed with mildly thick liquids.  NGT remains in but TF on hold to allow for po.  Fair to good intake per nsg. Wound Type: Multiple, Pressure Injury (coccyx)       Current Nutrition Intake & Therapies:    Average Meal Intake: %, 51-75%  Average Supplements Intake: None Ordered  ADULT DIET; Dysphagia - Pureed; Mildly Thick (Nectar); No Drinking Straws    Anthropometric Measures:  Height: 170.2 cm (5' 7\")  Ideal Body Weight (IBW): 135 lbs (61 kg)    Admission Body Weight: 98 
Comprehensive Nutrition Assessment    Type and Reason for Visit:  Reassess    Nutrition Recommendations/Plan:   Continue with Peptide based TF ( Vital 1.2) @ this time, this formula has lower osmolarity and is peptide based or easier digestion/absorption versus renal tube feeding  Begin slow progression to goal rate of 50 ml/hr via CorPak  Continue water flushed 30 ml every 6 hrs  Monitor for ability to advance po diet versus need for long term nutrition support     Malnutrition Assessment:  Malnutrition Status:  At risk for malnutrition (Comment) (04/22/24 1148)    Context:  Chronic Illness     Findings of the 6 clinical characteristics of malnutrition:  Energy Intake:  Mild decrease in energy intake (Comment) (6 days NPO)  Weight Loss:  Unable to assess (significant edema noted)     Body Fat Loss:  No significant body fat loss     Muscle Mass Loss:  No significant muscle mass loss    Fluid Accumulation:  Unable to assess     Strength:  Not Performed    Nutrition Assessment:    LOS # 10, intubated 4/22-24, required corPak placement for TF post-extubation for lethargy/dysphagia, day 4 trophic rate tube feeding, will begin slow progression of TF to goal, notes hx of severe gastroparesis, will hold of on transitioning to bolus schedule for now, per GI notes, if pt to require long term enteral support access, will need J-tube    Nutrition Related Findings:    PMHx  includes : of schizophrenia, CAD , GERD,COPD, type II DM, ESRD on dialysis ( anuric) , gastoparesis admitted for  rectal bleeding. Intubated 4/22-4/24 with trophic rate TF. Labs acceptable for ESRD, glucose variable ( 116-240), Meds include reglan, glycolax, renvela + BM's, significant generalized edema per nursing Wound Type: Multiple, Pressure Injury (coccyx)       Current Nutrition Intake & Therapies:    Average Meal Intake: NPO  Average Supplements Intake: NPO  Diet NPO Exceptions are: Sips of Water with Meds  ADULT TUBE FEEDING; Orogastric; Peptide 
[facility]  Occupational Therapy        NAME:  Michelle Le  ROOM: [id number]/W477-01  :  1960  DATE: 2024    Attempted to see Michelle Le at 1550 on this date for:   []  Initial Evaluation   [x]  Treatment       Patient was unable to be seen due to:   [] Off unit for testing/procedure    [] Patient refused, stating \"    [] Therapy on hold due to     [] Nursing deferred due to    [x] Other: pt receiving bedside care at time of attempt    Pt additionally with RR called prior to submitting missed note after having code called earlier in the day.    Will re-attempt when appropriate.    Electronically signed by Mechelle Martinez OT on 24 at 4:12 PM EDT  
[facility]  Occupational Therapy        NAME:  Michelle Le  ROOM: IC10/IC10-01  :  1960  DATE: 2024    Attempted to see Michelle Le at 1030 on this date for:   [x]  Initial Evaluation   []  Treatment       Patient was unable to be seen due to:   [] Off unit for testing/procedure    [] Patient refused, stating \"    [] Therapy on hold due to     [] Nursing deferred due to    [x] Other:  Pt receiving bedside dialysis    Electronically signed by CATHY Tsai on 24 at 10:33 AM EDT  
Gastroenterology Progress Note    Michelle Le is a 64 y.o. female patient.  Hospitalization Day:1    Chief C/O: GIB    SUBJECTIVE: Seen in the intensive care unit, hemoglobin 8.6, reportedly had 1 melanotic stool overnight, no further hematemesis,INR 5.3.     ROS:  Gastrointestinal ROS: no abdominal pain or change in bowel habits, reported black stool overnight    Physical    VITALS:  BP (!) 93/39   Pulse 87   Temp 98.9 °F (37.2 °C) (Oral)   Resp 16   Wt 102.2 kg (225 lb 5 oz)   LMP  (LMP Unknown)   SpO2 100%   BMI 35.28 kg/m²   TEMPERATURE:  Current - Temp: 98.9 °F (37.2 °C); Max - Temp  Av.7 °F (37.1 °C)  Min: 97.6 °F (36.4 °C)  Max: 99.2 °F (37.3 °C)    General:  Alert and oriented,  No apparent distress  Skin- without jaundice  Eyes: anicteric sclera  Cardiac: RRR, Nl s1s2, without murmurs  Lungs CTA Bilaterally, normal effort  Abdomen soft, ND, NT, no HSM, Bowel sounds normal  Ext: without edema  Neuro: no asterixis     Data    Data Review:    Recent Labs     24  0915 24  1447 24  2100 24  0310 24  0900   WBC 11.6*  --   --  14.0*  --    HGB 8.7*   < > 8.7* 8.7* 8.6*   HCT 26.5*   < > 26.9* 26.2* 25.8*   .9*  --   --  102.3*  --      --   --  182  --     < > = values in this interval not displayed.     Recent Labs     24  0915 24  0310   * 134*   K 5.1* 5.7*   CL 90* 92*   CO2 21 21   BUN 51* 57*   CREATININE 5.88* 6.69*     Recent Labs     24  0915 24  0310   AST 16 15   ALT <5 <5   BILITOT 0.8* 0.9*   ALKPHOS 75 79     No results for input(s): \"LIPASE\", \"AMYLASE\" in the last 72 hours.  Recent Labs     24  0915 24  1447 24  0310   PROTIME >120.0* 47.1* 47.9*   INR >10.0* 5.2* 5.3*           ASSESSMENT:  63 y/o female admitted from nursing home reportedly had several days of dark red stool, in the emergency department had episode of coffee-ground emesis.  Hemoglobin is 8.8, baseline is 11.6 in the 
Gastroenterology Progress Note    Michelle Le is a 64 y.o. female patient.  Hospitalization Day:3    Chief C/O: nausea/vomiting    SUBJECTIVE: Seen and examined, overnight events noted was transferred to ICU yesterday for hypotension initially required Levophed this has been weaned off, she is continued on PPI and octreotide infusion, had 2 episodes of dark emesis overnight with no drop in hemoglobin has associated epigastric pain.  No melena.  INR 2.6 despite vitamin K, Currently has hemodialysis going.    ROS:  Gastrointestinal ROS: has abdominal pain, no change in bowel habits, or black or bloody stools    Physical    VITALS:  BP (!) 123/38   Pulse 90   Temp 98 °F (36.7 °C) (Oral)   Resp 24   Ht 1.702 m (5' 7\")   Wt 96.9 kg (213 lb 10 oz)   LMP  (LMP Unknown)   SpO2 (!) 84%   BMI 33.46 kg/m²   TEMPERATURE:  Current - Temp: 98 °F (36.7 °C); Max - Temp  Av.8 °F (36.6 °C)  Min: 97.3 °F (36.3 °C)  Max: 98.4 °F (36.9 °C)    General:  Alert and oriented,  No apparent distress  Skin- without jaundice  Eyes: anicteric sclera  Cardiac: RRR, Nl s1s2, without murmurs  Lungs CTA Bilaterally, normal effort  Abdomen soft, ND, NT, no HSM, Bowel sounds normal  Ext: without edema  Neuro: no asterixis     Data    Data Review:    Recent Labs     24  0310 24  0900 24  0550 24  1030 24  0300 24  0333   WBC 14.0*  --  15.2*  --   --  19.1*   HGB 8.7*   < > 8.7* 8.6* 9.2* 9.1*   HCT 26.2*   < > 26.3* 26.1* 27.8* 27.3*   .3*  --  103.1*  --   --  101.9*     --  198  --   --  247    < > = values in this interval not displayed.     Recent Labs     24  1218 24  0546 24  0333    137 135   K 4.0 4.7 4.8   CL 90* 90* 90*   CO2 27 23 23   BUN 24* 37* 57*   CREATININE 3.64* 4.59* 5.59*     Recent Labs     24  0310 24  0546 24  0333   AST 15 12 14   ALT <5 <5 <5   BILITOT 0.9* 1.1* 1.1*   ALKPHOS 79 79 81     No results for input(s): 
Gastroenterology Progress Note    Michelle Le is a 64 y.o. female patient.  Hospitalization Day:5    Chief C/O: Nausea/vomiting    SUBJECTIVE: Patient  had 3 episodes of coffee ground emesis yesterday.  Hemoglobin remained stable at 9.4.  Patient has not received any blood transfusions during this admission.  Patient remains on Levophed in ICU.  Patient was started on Reglan yesterday, denies any nausea vomiting nor abdominal pain since.  Patient tolerating clear liquid diet without difficulty. Patient reports no bowel movement x 4 days. Per nursing staff patient had  medium sized bowel movement overnight with blood noted in stool.       ROS:  Gastrointestinal ROS: no abdominal pain, change in bowel habits, or black or bloody stools    Physical    VITALS:  BP (!) 90/36   Pulse 84   Temp 97 °F (36.1 °C) (Oral)   Resp 18   Ht 1.702 m (5' 7\")   Wt 96.9 kg (213 lb 10 oz)   LMP  (LMP Unknown)   SpO2 97%   BMI 33.46 kg/m²   TEMPERATURE:  Current - Temp: 97 °F (36.1 °C); Max - Temp  Av.6 °F (36.4 °C)  Min: 97 °F (36.1 °C)  Max: 98.4 °F (36.9 °C)    General -no acute distress  Eyes -no icterus, no pallor  Cardiovascular - RRR, no murmur  Lungs -clear to auscultation bilaterally  Abdomen -non-distended, non-tender, no organomegaly, Bowel sounds normal  Extremities -no edema  Skin -warm and dry  Neuro: No asterixis     Data    Data Review:    Recent Labs     24  0333 24  1100 24  0600 24  1026 24  2100 24  0600   WBC 19.1*  --  21.8*  --   --  15.5*   HGB 9.1*   < > 9.5* 9.1* 9.1* 9.4*   HCT 27.3*   < > 29.4* 27.6* 27.9* 28.7*   .9*  --  104.6*  --   --  104.0*     --  277  --   --  175    < > = values in this interval not displayed.     Recent Labs     24  0333 24  0600 24  0600    138 135   K 4.8 4.4 4.8   CL 90* 90* 90*   CO2 23  19*   BUN 57* 46* 61*   CREATININE 5.59* 4.45* 5.23*     Recent Labs     24  0333 
Hematology/Oncology   Progress Note        CHIEF COMPLAINT/HPI:  Follow up of anemia and thrombocytopenia[place] at [address], hemoglobin at 9.6. Coagulopathy has resolved.    REVIEW OF SYSTEMS:    Unremarkable except for symptoms mentioned in HPI.    Current Inpatient Medications:    Current Facility-Administered Medications   Medication Dose Route Frequency Provider Last Rate Last Admin    insulin lispro (HUMALOG) injection vial 0-8 Units  0-8 Units SubCUTAneous TID  Vinnie Smith PA        insulin lispro (HUMALOG) injection vial 0-4 Units  0-4 Units SubCUTAneous Nightly Vinnie Smith PA        insulin glargine (LANTUS) injection vial 20 Units  20 Units SubCUTAneous BID Andrei Nino MD        insulin lispro (HUMALOG) injection vial 4 Units  4 Units SubCUTAneous TID  Andrei Nino MD        ALPRAZolam (XANAX) tablet 0.25 mg  0.25 mg Oral BID PRN Milagros Hanna APRN - CNP   0.25 mg at 04/30/24 1447    norepinephrine (LEVOPHED) 16 mg in sodium chloride 0.9 % 250 mL infusion  1-100 mcg/min IntraVENous Continuous Milagros Hanna APRN - CNP 3.8 mL/hr at 04/29/24 1222 4 mcg/min at 04/29/24 1222    polyethylene glycol (GLYCOLAX) packet 17 g  17 g Oral Daily Milagros Hanna APRN - CNP        hydrocortisone (CORTEF) tablet 20 mg  20 mg Oral BID Vinnie Smith PA   20 mg at 04/30/24 0749    modafinil (PROVIGIL) tablet 100 mg  100 mg Oral Daily Say Marsh MD   100 mg at 04/30/24 0749    meropenem (MERREM) 500 mg in sodium chloride 0.9 % 100 mL IVPB (mini-bag)  500 mg IntraVENous Q24H Autumn Rojo MD 33.3 mL/hr at 04/30/24 1605 500 mg at 04/30/24 1605    sevelamer (RENVELA) packet 0.8 g  0.8 g Oral TID Stephani Burton MD   0.8 g at 04/30/24 1609    docusate (COLACE) 50 MG/5ML liquid 100 mg  100 mg Oral BID Milagros Hanna APRN - CNP   100 mg at 04/29/24 0806    pantoprazole (PROTONIX) 40 mg in sodium chloride (PF) 0.9 % 10 mL injection  40 mg IntraVENous Q12H Giovani Ron MD   40 mg at 04/30/24 
Hematology/Oncology  Attending Progress Note        CHIEF COMPLAINT/HPI:  No gross active bleeding. Pt had mild dizziness earlier today which resolved  --no abd pain    REVIEW OF SYSTEMS:    Unremarkable except for symptoms mentioned in HPI.    Current Inpatient Medications:    Current Facility-Administered Medications: insulin lispro (HUMALOG) injection vial 0-8 Units, 0-8 Units, SubCUTAneous, TID WC  insulin lispro (HUMALOG) injection vial 0-4 Units, 0-4 Units, SubCUTAneous, Nightly  hydrocortisone sodium succinate PF (SOLU-CORTEF) injection 50 mg, 50 mg, IntraVENous, Q6H  vancomycin (VANCOCIN) intermittent dosing (placeholder), , Other, RX Placeholder  metoclopramide (REGLAN) injection 10 mg, 10 mg, IntraVENous, Q6H  bisacodyl (DULCOLAX) suppository 10 mg, 10 mg, Rectal, Daily PRN  sertraline (ZOLOFT) tablet 25 mg, 25 mg, Oral, Nightly  bisacodyl (DULCOLAX) EC tablet 5 mg, 5 mg, Oral, Daily PRN  mupirocin (BACTROBAN) 2 % ointment, , Topical, BID  norepinephrine (LEVOPHED) 16 mg in sodium chloride 0.9 % 250 mL infusion, 1-100 mcg/min, IntraVENous, Continuous  midodrine (PROAMATINE) tablet 20 mg, 20 mg, Oral, TID AC  chitosan (CELOX) powder, , Apply externally, PRN  albumin human 25% IV solution 25 g, 25 g, IntraVENous, Daily PRN  sodium chloride flush 0.9 % injection 5-40 mL, 5-40 mL, IntraVENous, 2 times per day  sodium chloride flush 0.9 % injection 5-40 mL, 5-40 mL, IntraVENous, PRN  0.9 % sodium chloride infusion, , IntraVENous, PRN  ondansetron (ZOFRAN-ODT) disintegrating tablet 4 mg, 4 mg, Oral, Q8H PRN **OR** ondansetron (ZOFRAN) injection 4 mg, 4 mg, IntraVENous, Q6H PRN  acetaminophen (TYLENOL) tablet 650 mg, 650 mg, Oral, Q6H PRN **OR** acetaminophen (TYLENOL) suppository 650 mg, 650 mg, Rectal, Q6H PRN  glucose chewable tablet 16 g, 4 tablet, Oral, PRN  dextrose bolus 10% 125 mL, 125 mL, IntraVENous, PRN **OR** dextrose bolus 10% 250 mL, 250 mL, IntraVENous, PRN  glucagon injection 1 mg, 1 mg, 
Hematology/Oncology  Attending Progress Note        CHIEF COMPLAINT/HPI:  No gross bleeding      REVIEW OF SYSTEMS:    Unremarkable except for symptoms mentioned in HPI.    Current Inpatient Medications:    Current Facility-Administered Medications: sodium zirconium cyclosilicate (LOKELMA) oral suspension 10 g, 10 g, Oral, Once  midodrine (PROAMATINE) tablet 20 mg, 20 mg, Oral, TID AC  chitosan (CELOX) powder, , Apply externally, PRN  albumin human 25% IV solution 25 g, 25 g, IntraVENous, Daily PRN  sodium chloride flush 0.9 % injection 5-40 mL, 5-40 mL, IntraVENous, 2 times per day  sodium chloride flush 0.9 % injection 5-40 mL, 5-40 mL, IntraVENous, PRN  0.9 % sodium chloride infusion, , IntraVENous, PRN  ondansetron (ZOFRAN-ODT) disintegrating tablet 4 mg, 4 mg, Oral, Q8H PRN **OR** ondansetron (ZOFRAN) injection 4 mg, 4 mg, IntraVENous, Q6H PRN  acetaminophen (TYLENOL) tablet 650 mg, 650 mg, Oral, Q6H PRN **OR** acetaminophen (TYLENOL) suppository 650 mg, 650 mg, Rectal, Q6H PRN  glucose chewable tablet 16 g, 4 tablet, Oral, PRN  dextrose bolus 10% 125 mL, 125 mL, IntraVENous, PRN **OR** dextrose bolus 10% 250 mL, 250 mL, IntraVENous, PRN  glucagon injection 1 mg, 1 mg, SubCUTAneous, PRN  dextrose 10 % infusion, , IntraVENous, Continuous PRN  insulin lispro (HUMALOG) injection vial 0-16 Units, 0-16 Units, SubCUTAneous, Q4H  ARIPiprazole (ABILIFY) tablet 5 mg, 5 mg, Oral, Daily  atorvastatin (LIPITOR) tablet 40 mg, 40 mg, Oral, Nightly  melatonin tablet 3 mg, 3 mg, Oral, Nightly PRN  sertraline (ZOLOFT) tablet 50 mg, 50 mg, Oral, Nightly  sevelamer (RENVELA) tablet 800 mg, 800 mg, Oral, TID  hydrOXYzine HCl (ATARAX) tablet 25 mg, 25 mg, Oral, BID  [Held by provider] amoxicillin (AMOXIL) capsule 500 mg, 500 mg, Oral, Daily  hydrocortisone sodium succinate PF (SOLU-CORTEF) injection 50 mg, 50 mg, IntraVENous, Q8H  budesonide-formoterol (SYMBICORT) 160-4.5 MCG/ACT inhaler 2 puff, 2 puff, Inhalation, BID RT **AND** 
Hematology/Oncology  Attending Progress Note        CHIEF COMPLAINT/HPI:  No gross bleeding    REVIEW OF SYSTEMS:    Unremarkable except for symptoms mentioned in HPI.    Current Inpatient Medications:    Current Facility-Administered Medications: modafinil (PROVIGIL) tablet 100 mg, 100 mg, Oral, Daily  [COMPLETED] meropenem (MERREM) 1,000 mg in sodium chloride 0.9 % 100 mL IVPB (mini-bag), 1,000 mg, IntraVENous, Once **FOLLOWED BY** meropenem (MERREM) 500 mg in sodium chloride 0.9 % 100 mL IVPB (mini-bag), 500 mg, IntraVENous, Q24H  hydrocortisone sodium succinate PF (SOLU-CORTEF) injection 100 mg, 100 mg, IntraVENous, Q6H  insulin glargine (LANTUS) injection vial 25 Units, 25 Units, SubCUTAneous, Nightly  insulin lispro (HUMALOG) injection vial 4 Units, 4 Units, SubCUTAneous, 4x Daily  docusate (COLACE) 50 MG/5ML liquid 100 mg, 100 mg, Oral, BID  pantoprazole (PROTONIX) 40 mg in sodium chloride (PF) 0.9 % 10 mL injection, 40 mg, IntraVENous, Q12H  norepinephrine (LEVOPHED) 16 mg in sodium chloride 0.9 % 250 mL infusion, 1-100 mcg/min, IntraVENous, Continuous  insulin lispro (HUMALOG) injection vial 0-16 Units, 0-16 Units, SubCUTAneous, Q4H  chlorhexidine (PERIDEX) 0.12 % solution 15 mL, 15 mL, Mouth/Throat, BID  sodium chloride flush 0.9 % injection 5-40 mL, 5-40 mL, IntraVENous, 2 times per day  sodium chloride flush 0.9 % injection 5-40 mL, 5-40 mL, IntraVENous, PRN  0.9 % sodium chloride infusion, , IntraVENous, PRN  polyethylene glycol (GLYCOLAX) packet 17 g, 17 g, Oral, BID  metoclopramide (REGLAN) injection 10 mg, 10 mg, IntraVENous, Q6H  bisacodyl (DULCOLAX) suppository 10 mg, 10 mg, Rectal, Daily PRN  sertraline (ZOLOFT) tablet 25 mg, 25 mg, Oral, Nightly  bisacodyl (DULCOLAX) EC tablet 5 mg, 5 mg, Oral, Daily PRN  mupirocin (BACTROBAN) 2 % ointment, , Topical, BID  midodrine (PROAMATINE) tablet 20 mg, 20 mg, Oral, TID AC  chitosan (CELOX) powder, , Apply externally, PRN  albumin human 25% IV solution 25 
Hematology/Oncology  Attending Progress Note        CHIEF COMPLAINT/HPI:  No gross bleeding;  --Pt intubated  because of altered  mental status (lethargy) and  tachypnea  --EGD done today by  (results as reported below)    REVIEW OF SYSTEMS:    Unremarkable except for symptoms mentioned in HPI.    Current Inpatient Medications:    Current Facility-Administered Medications: phenylephrine (VAZCULEP) 10 MG/ML injection, , ,   sodium chloride (PF) 0.9 % injection, , ,   vancomycin (VANCOCIN) 1,500 mg in sodium chloride 0.9 % 500 mL IVPB (ADDAVIAL), 15 mg/kg, IntraVENous, Once  norepinephrine (LEVOPHED) 16 mg in sodium chloride 0.9 % 250 mL infusion, 1-100 mcg/min, IntraVENous, Continuous  insulin lispro (HUMALOG) injection vial 0-16 Units, 0-16 Units, SubCUTAneous, Q4H  propofol infusion, 5-50 mcg/kg/min, IntraVENous, Continuous  fentaNYL (SUBLIMAZE) 1,000 mcg in sodium chloride 0.9% 100 mL infusion,  mcg/hr, IntraVENous, Continuous  chlorhexidine (PERIDEX) 0.12 % solution 15 mL, 15 mL, Mouth/Throat, BID  [COMPLETED] meropenem (MERREM) 1,000 mg in sodium chloride 0.9 % 100 mL IVPB (mini-bag), 1,000 mg, IntraVENous, Once **FOLLOWED BY** [START ON 4/23/2024] meropenem (MERREM) 500 mg in sodium chloride 0.9 % 100 mL IVPB (mini-bag), 500 mg, IntraVENous, Q24H  polyethylene glycol (GLYCOLAX) packet 17 g, 17 g, Oral, BID  hydrocortisone sodium succinate PF (SOLU-CORTEF) injection 50 mg, 50 mg, IntraVENous, Q6H  vancomycin (VANCOCIN) intermittent dosing (placeholder), , Other, RX Placeholder  metoclopramide (REGLAN) injection 10 mg, 10 mg, IntraVENous, Q6H  bisacodyl (DULCOLAX) suppository 10 mg, 10 mg, Rectal, Daily PRN  sertraline (ZOLOFT) tablet 25 mg, 25 mg, Oral, Nightly  bisacodyl (DULCOLAX) EC tablet 5 mg, 5 mg, Oral, Daily PRN  mupirocin (BACTROBAN) 2 % ointment, , Topical, BID  midodrine (PROAMATINE) tablet 20 mg, 20 mg, Oral, TID AC  chitosan (CELOX) powder, , Apply externally, PRN  albumin human 25% IV 
Hematology/Oncology  Attending Progress Note        CHIEF COMPLAINT/HPI:  No gross bleeding; extubated    REVIEW OF SYSTEMS:    Unremarkable except for symptoms mentioned in HPI.    Current Inpatient Medications:    Current Facility-Administered Medications: modafinil (PROVIGIL) tablet 100 mg, 100 mg, Oral, Daily  [COMPLETED] meropenem (MERREM) 1,000 mg in sodium chloride 0.9 % 100 mL IVPB (mini-bag), 1,000 mg, IntraVENous, Once **FOLLOWED BY** meropenem (MERREM) 500 mg in sodium chloride 0.9 % 100 mL IVPB (mini-bag), 500 mg, IntraVENous, Q24H  insulin glargine (LANTUS) injection vial 14 Units, 14 Units, SubCUTAneous, Nightly  insulin lispro (HUMALOG) injection vial 0-4 Units, 0-4 Units, SubCUTAneous, 4x Daily  sevelamer (RENVELA) packet 0.8 g, 0.8 g, Oral, TID  hydrocortisone sodium succinate PF (SOLU-CORTEF) injection 100 mg, 100 mg, IntraVENous, Q6H  insulin lispro (HUMALOG) injection vial 4 Units, 4 Units, SubCUTAneous, 4x Daily  docusate (COLACE) 50 MG/5ML liquid 100 mg, 100 mg, Oral, BID  pantoprazole (PROTONIX) 40 mg in sodium chloride (PF) 0.9 % 10 mL injection, 40 mg, IntraVENous, Q12H  norepinephrine (LEVOPHED) 16 mg in sodium chloride 0.9 % 250 mL infusion, 1-100 mcg/min, IntraVENous, Continuous  chlorhexidine (PERIDEX) 0.12 % solution 15 mL, 15 mL, Mouth/Throat, BID  sodium chloride flush 0.9 % injection 5-40 mL, 5-40 mL, IntraVENous, 2 times per day  sodium chloride flush 0.9 % injection 5-40 mL, 5-40 mL, IntraVENous, PRN  0.9 % sodium chloride infusion, , IntraVENous, PRN  polyethylene glycol (GLYCOLAX) packet 17 g, 17 g, Oral, BID  metoclopramide (REGLAN) injection 10 mg, 10 mg, IntraVENous, Q6H  bisacodyl (DULCOLAX) suppository 10 mg, 10 mg, Rectal, Daily PRN  sertraline (ZOLOFT) tablet 25 mg, 25 mg, Oral, Nightly  bisacodyl (DULCOLAX) EC tablet 5 mg, 5 mg, Oral, Daily PRN  mupirocin (BACTROBAN) 2 % ointment, , Topical, BID  midodrine (PROAMATINE) tablet 20 mg, 20 mg, Oral, TID AC  chitosan (CELOX) 
Hematology/Oncology  Attending Progress Note        CHIEF COMPLAINT/HPI:  No gross bleeding; intubated; on vent; recently taken off sedation    REVIEW OF SYSTEMS:    Unremarkable except for symptoms mentioned in HPI.    Current Inpatient Medications:    Current Facility-Administered Medications: docusate (COLACE) 50 MG/5ML liquid 100 mg, 100 mg, Oral, BID  pantoprazole (PROTONIX) 40 mg in sodium chloride (PF) 0.9 % 10 mL injection, 40 mg, IntraVENous, Q12H  norepinephrine (LEVOPHED) 16 mg in sodium chloride 0.9 % 250 mL infusion, 1-100 mcg/min, IntraVENous, Continuous  insulin lispro (HUMALOG) injection vial 0-16 Units, 0-16 Units, SubCUTAneous, Q4H  propofol infusion, 5-50 mcg/kg/min, IntraVENous, Continuous  fentaNYL (SUBLIMAZE) 1,000 mcg in sodium chloride 0.9% 100 mL infusion,  mcg/hr, IntraVENous, Continuous  chlorhexidine (PERIDEX) 0.12 % solution 15 mL, 15 mL, Mouth/Throat, BID  [COMPLETED] meropenem (MERREM) 1,000 mg in sodium chloride 0.9 % 100 mL IVPB (mini-bag), 1,000 mg, IntraVENous, Once **FOLLOWED BY** meropenem (MERREM) 500 mg in sodium chloride 0.9 % 100 mL IVPB (mini-bag), 500 mg, IntraVENous, Q24H  0.9 % sodium chloride infusion, , IntraVENous, Continuous  sodium chloride flush 0.9 % injection 5-40 mL, 5-40 mL, IntraVENous, 2 times per day  sodium chloride flush 0.9 % injection 5-40 mL, 5-40 mL, IntraVENous, PRN  0.9 % sodium chloride infusion, , IntraVENous, PRN  polyethylene glycol (GLYCOLAX) packet 17 g, 17 g, Oral, BID  hydrocortisone sodium succinate PF (SOLU-CORTEF) injection 50 mg, 50 mg, IntraVENous, Q6H  vancomycin (VANCOCIN) intermittent dosing (placeholder), , Other, RX Placeholder  metoclopramide (REGLAN) injection 10 mg, 10 mg, IntraVENous, Q6H  bisacodyl (DULCOLAX) suppository 10 mg, 10 mg, Rectal, Daily PRN  sertraline (ZOLOFT) tablet 25 mg, 25 mg, Oral, Nightly  bisacodyl (DULCOLAX) EC tablet 5 mg, 5 mg, Oral, Daily PRN  mupirocin (BACTROBAN) 2 % ointment, , Topical, 
Hematology/Oncology  Attending Progress Note        CHIEF COMPLAINT/HPI:  No gross bleeding; more awake; able to verbalize a little better; able to follow some simple commands    REVIEW OF SYSTEMS:    Unremarkable except for symptoms mentioned in HPI.    Current Inpatient Medications:    Current Facility-Administered Medications: hydrocortisone sodium succinate PF (SOLU-CORTEF) injection 25 mg, 25 mg, IntraVENous, Q8H  insulin glargine (LANTUS) injection vial 30 Units, 30 Units, SubCUTAneous, Nightly  insulin lispro (HUMALOG) injection vial 4 Units, 4 Units, SubCUTAneous, 4x Daily  insulin lispro (HUMALOG) injection vial 0-8 Units, 0-8 Units, SubCUTAneous, 4x Daily  modafinil (PROVIGIL) tablet 100 mg, 100 mg, Oral, Daily  [COMPLETED] meropenem (MERREM) 1,000 mg in sodium chloride 0.9 % 100 mL IVPB (mini-bag), 1,000 mg, IntraVENous, Once **FOLLOWED BY** meropenem (MERREM) 500 mg in sodium chloride 0.9 % 100 mL IVPB (mini-bag), 500 mg, IntraVENous, Q24H  sevelamer (RENVELA) packet 0.8 g, 0.8 g, Oral, TID  docusate (COLACE) 50 MG/5ML liquid 100 mg, 100 mg, Oral, BID  pantoprazole (PROTONIX) 40 mg in sodium chloride (PF) 0.9 % 10 mL injection, 40 mg, IntraVENous, Q12H  norepinephrine (LEVOPHED) 16 mg in sodium chloride 0.9 % 250 mL infusion, 1-100 mcg/min, IntraVENous, Continuous  chlorhexidine (PERIDEX) 0.12 % solution 15 mL, 15 mL, Mouth/Throat, BID  sodium chloride flush 0.9 % injection 5-40 mL, 5-40 mL, IntraVENous, 2 times per day  sodium chloride flush 0.9 % injection 5-40 mL, 5-40 mL, IntraVENous, PRN  0.9 % sodium chloride infusion, , IntraVENous, PRN  polyethylene glycol (GLYCOLAX) packet 17 g, 17 g, Oral, BID  metoclopramide (REGLAN) injection 10 mg, 10 mg, IntraVENous, Q6H  bisacodyl (DULCOLAX) suppository 10 mg, 10 mg, Rectal, Daily PRN  sertraline (ZOLOFT) tablet 25 mg, 25 mg, Oral, Nightly  bisacodyl (DULCOLAX) EC tablet 5 mg, 5 mg, Oral, Daily PRN  mupirocin (BACTROBAN) 2 % ointment, , Topical, 
Infectious Diseases Inpatient Progress Note          HISTORY OF PRESENT ILLNESS:  Follow up shock, acute respiratory failure, GI bleed, liver cirrhosis with ascites on IV meropenem, well tolerated.  Patient remains to be in ICU.  No confusion.  Denies any specific complaints.   Remains to be very weak.   Was started on a diet today.   Corpak is still in place.   Positive Hemoccult stools noted today.  Remains to be on Levophed at 3 mics  No fevers  Positive ecchymosis without any wounds  End-stage renal disease on hemodialysis with upper extremity AV fistula  Current Medications:     insulin lispro  0-16 Units SubCUTAneous Q4H    [START ON 4/30/2024] polyethylene glycol  17 g Oral Daily    insulin glargine  36 Units SubCUTAneous Nightly    hydrocortisone sodium succinate PF  25 mg IntraVENous Q8H    modafinil  100 mg Oral Daily    meropenem  500 mg IntraVENous Q24H    sevelamer  0.8 g Oral TID    docusate  100 mg Oral BID    pantoprazole (PROTONIX) 40 mg in sodium chloride (PF) 0.9 % 10 mL injection  40 mg IntraVENous Q12H    chlorhexidine  15 mL Mouth/Throat BID    sodium chloride flush  5-40 mL IntraVENous 2 times per day    sertraline  25 mg Oral Nightly    mupirocin   Topical BID    midodrine  20 mg Oral TID AC    sodium chloride flush  5-40 mL IntraVENous 2 times per day    ARIPiprazole  5 mg Oral Daily    atorvastatin  40 mg Oral Nightly    hydrOXYzine HCl  25 mg Oral BID    [Held by provider] amoxicillin  500 mg Oral Daily    budesonide-formoterol  2 puff Inhalation BID RT    And    tiotropium  2 puff Inhalation Daily RT       Allergies:  Codeine and Oxycontin [oxycodone hcl]      Review of Systems  Limited review of system because of acute illness.  Denies any pain.  No cough or shortness of breath.  Was found to have positive Hemoccult stools.   No fevers  Positive severe generalized weakness  Had 4 bowel movements today  Was started on a diet today  Corpak is still in place    Physical Exam  Vitals:    
Infectious Diseases Inpatient Progress Note      HISTORY OF PRESENT ILLNESS:  Follow up shock, acute respiratory failure, GI bleed, liver cirrhosis with ascites on IV meropenem, well tolerated.  Patient remains to be in ICU.  No confusion.  Denies any specific complaints.   Remains to be very weak.   Tolerating diet.   Corpak is still in place.  To be removed once dietitian issues enough calorie intake  Remains to be on low-dose Levophed   No fevers  Positive ecchymosis without any wounds  End-stage renal disease on hemodialysis with upper extremity AV fistula  Current Medications:     insulin lispro  0-8 Units SubCUTAneous TID WC    insulin lispro  0-4 Units SubCUTAneous Nightly    insulin glargine  20 Units SubCUTAneous BID    insulin lispro  4 Units SubCUTAneous TID WC    polyethylene glycol  17 g Oral Daily    hydrocortisone  20 mg Oral BID    modafinil  100 mg Oral Daily    meropenem  500 mg IntraVENous Q24H    sevelamer  0.8 g Oral TID    docusate  100 mg Oral BID    pantoprazole (PROTONIX) 40 mg in sodium chloride (PF) 0.9 % 10 mL injection  40 mg IntraVENous Q12H    chlorhexidine  15 mL Mouth/Throat BID    sodium chloride flush  5-40 mL IntraVENous 2 times per day    sertraline  25 mg Oral Nightly    midodrine  20 mg Oral TID AC    sodium chloride flush  5-40 mL IntraVENous 2 times per day    ARIPiprazole  5 mg Oral Daily    atorvastatin  40 mg Oral Nightly    hydrOXYzine HCl  25 mg Oral BID    [Held by provider] amoxicillin  500 mg Oral Daily    budesonide-formoterol  2 puff Inhalation BID RT    And    tiotropium  2 puff Inhalation Daily RT       Allergies:  Codeine and Oxycontin [oxycodone hcl]      Review of Systems  Limited review of system because of acute illness.  Denies any pain.  No cough or shortness of breath.  No diarrhea  No pressure sores  No fevers  Positive severe generalized weakness  No dysphagia   was started on a diet today  Corpak is still in place    Physical Exam  Vitals:    04/30/24 
[facility] OCCUPATIONAL THERAPY EVALUATION - ACUTE     NAME: Michelle Le  : 1960 (64 y.o.)  MRN: [id number]  CODE STATUS: Full Code  Room: [place] [address]    Date of Service: 2024    Patient Diagnosis(es): Coagulopathy (HCC) [D68.9]  GI bleed [K92.2]  Gastrointestinal hemorrhage with melena [K92.1]   Patient Active Problem List    Diagnosis Date Noted    Unstable angina (HCC) 2022    Chest pain     Infection of venous access port 2022    Lumbar stenosis with neurogenic claudication 07/15/2022    Falls infrequently 07/15/2022    Cervical stenosis of spinal canal     Ataxic gait     Chronic hepatitis C (HCC) 07/10/2022    Catheter-related bloodstream infection     Enterococcus faecalis infection     Sepsis due to skin infection (HCC) 2022    Sarcopenia 2022    Fall from standing     DJD (degenerative joint disease), cervical 2022    Closed head injury     MRSA bacteremia 2022    Sepsis due to Enterococcus ([facility])     Local infection due to central venous catheter     Impaired mobility and activities of daily living dt polyneuropathy and reccent fall  2022    Closed T11 fracture ([facility]) 2022    Encephalopathy acute 2022    Unspecified open wound of right upper arm, initial encounter 2022    Hyperkalemia 2022    CKD (chronic kidney disease) stage 4, GFR 15-29 ml/min ([facility]) 2022    Open wound of left hand without foreign body 2024    Health education/counseling 2024    Full code status 2024    GI bleed 2024    Adrenal insufficiency (HCC) 2024    Hypogammaglobulinemia ([facility]) 2024    Lack of intravenous access 2024    Pressure injury, unstageable, with suspected deep tissue injury (HCC) 2024    Nonhealing nonsurgical wound 2024    Skin picking habit 2024    Hypotension 2024    ESRD on dialysis (HCC) 2024    Acute decompensated heart failure (HCC) 2024    
[facility]CCUPATIONAL THERAPY EVALUATION - ACUTE     NAME: Michelle Le  : 1960 (64 y.o.)  MRN: [id number]  CODE STATUS: Full Code  Room: [place] [address]    Date of Service: 2024    Patient Diagnosis(es): Coagulopathy (HCC) [D68.9]  GI bleed [K92.2]  Gastrointestinal hemorrhage with melena [K92.1]   Patient Active Problem List    Diagnosis Date Noted    Unstable angina (HCC) 2022    Chest pain     Infection of venous access port 2022    Lumbar stenosis with neurogenic claudication 07/15/2022    Falls infrequently 07/15/2022    Cervical stenosis of spinal canal     Ataxic gait     Chronic hepatitis C ([facility]) 07/10/2022    Catheter-related bloodstream infection     Enterococcus faecalis infection     Sepsis due to skin infection (HCC) 2022    Sarcopenia 2022    Fall from standing     DJD (degenerative joint disease), cervical 2022    Closed head injury     MRSA bacteremia 2022    Sepsis due to Enterococcus ([facility])     Local infection due to central venous catheter     Impaired mobility and activities of daily living dt polyneuropathy and reccent fall  2022    Closed T11 fracture ([facility]) 2022    Encephalopathy acute 2022    Unspecified open wound of right upper arm, initial encounter 2022    Hyperkalemia 2022    CKD (chronic kidney disease) stage 4, GFR 15-29 ml/min ([facility]) 2022    Health education/counseling 2024    Full code status 2024    GI bleed 2024    Adrenal insufficiency ([facility]) 2024    Hypogammaglobulinemia ([facility]) 2024    Lack of intravenous access 2024    Pressure injury, unstageable, with suspected deep tissue injury ([facility]) 2024    Nonhealing nonsurgical wound 2024    Skin picking habit 2024    Hypotension 2024    ESRD on dialysis (HCC) 2024    Acute decompensated heart failure (HCC) 2024    Constipation 2024    Chronic antibiotic suppression 2024 
[facility]   Facility/Department: [facility] ICU  Speech Language Pathology    Michelle Le  1960  IC02/IC02-01    Date: 4/25/2024      Speech Therapy attempted to see Michelle BECERRA Le on this date for a/an:    Clinical Bedside Swallow Evaluation    Pt was unable to be seen due to:   Nursing deferred: NPO for potential procedure this AM. Re-attempt PM as able.         Electronically signed by ANN MARIE Allred on 4/25/24 at 10:22 AM EDT   
[facility]  Facility/Department: [facility] ICU  Speech Language Pathology   Treatment Note      Michelle Le  1960  IC11/IC11-01  [x]   confirmed      Date: 2024    Coagulopathy (HCC) [D68.9]  GI bleed [K92.2]  Gastrointestinal hemorrhage with melena [K92.1]    Restrictions/Precautions: Fall Risk, NPO    ADULT DIET; Dysphagia - Pureed; Mildly Thick (Nectar); No Drinking Straws     Respiratory Status:O2 Flow Rate (L/min): 2 L/min (24 0331) 2L  No active isolations      Subjective:  Alert, Cooperative, and Pleasant        Interventions used this date:  Dysphagia Treatment      Objective/Assessment:  Patient progressing towards goals:  Short-term Goals  Timeframe for Short-term Goals: 1-2 weeks  Goal 1: SLP to continue to assess need for MBS study in order to more objectively assess pharyngeal phase of swallow and determine least restrictive diet consistency.--Not recommended this date.    Goal 2: Pt will tolerate the recommended diet level without clinical s/s of aspiration.--  Pt trialed NTL 10 x.  Pt demonstrated immediate cough following 1 trials after large gulp.  No overt s/s of aspiration observed following small sips from spoon.      Pt trialed puree 8x with no overt s/s of aspiration.    Continue current diet.    Goal 3: Pt will complete oral motor ROM and strengthening exercises x10 each, given cues as needed, in order to strengthen lingual/labial/buccal musculature to promote safety and efficiency of oral phase of swallow and decrease risk for pocketing.--pt completed labial/buccal exercises 10x following moderate models and verbal cues.    Pt completed lingual lateralization 10x following moderate models, verbal cues and tactile cues provided.    Unable to follow direction for lingual press this date.    Goal 4: Pt will complete pharyngeal strengthening exercises (such as the Lisbet, Effortful swallow, etc) x10 each, in order to strengthen and establish and  more effective swallow.--pt 
[facility]   Facility/Department: [facility]  Speech Language Pathology    Michelle Le  1960  IC02/IC02-01    Date: 4/25/2024      Speech Therapy attempted to see Michelle Le on this date for a/an:    Clinical Bedside Swallow Evaluation    Pt was unable to be seen due to:   Nursing deferred: NP and RN report pt continues to be lethargic. All in agreement to re-attempt 4/26/24.         Electronically signed by ANN MARIE Allred on 4/25/24 at 3:43 PM EDT   
[facility]  Change in Status Communication Form      To the referring physician:    Due to an acute change in this patient's medical status, new Occupational Therapy Eval and Treatment orders are required.  Pt has been re-intubated, will require new orders at this time . Please reorder when pt. is medically stable to resume OOB activity, as appropriate.    We thank you for your referral.     Regards,   Sakina Walker OT Department.     Electronically signed by ELDA Tsai/L on 4/22/24 at 11:54 AM EDT  
[facility]   Facility/Department: [facility]  Speech Language Pathology  Clinical Bedside Swallow Evaluation    NAME:Michelle Le  : 1960 (64 y.o.)   [x]   confirmed    MRN: [id number]  ROOM: [place] [address]  ADMISSION DATE: 2024  PATIENT DIAGNOSIS(ES): Coagulopathy (HCC) [D68.9]  GI bleed [K92.2]  Gastrointestinal hemorrhage with melena [K92.1]  Chief Complaint   Patient presents with    Rectal Bleeding     X1 week      Patient Active Problem List    Diagnosis Date Noted    Unstable angina (HCC) 2022    Chest pain     Infection of venous access port 2022    Lumbar stenosis with neurogenic claudication 07/15/2022    Falls infrequently 07/15/2022    Cervical stenosis of spinal canal     Ataxic gait     Chronic hepatitis C (HCC) 07/10/2022    Catheter-related bloodstream infection     Enterococcus faecalis infection     Sepsis due to skin infection (HCC) 2022    Sarcopenia 2022    Fall from standing     DJD (degenerative joint disease), cervical 2022    Closed head injury     MRSA bacteremia 2022    Sepsis due to Enterococcus (HCC)     Local infection due to central venous catheter     Impaired mobility and activities of daily living dt polyneuropathy and reccent fall  2022    Closed T11 fracture (HCC) 2022    Encephalopathy acute 2022    Unspecified open wound of right upper arm, initial encounter 2022    Hyperkalemia 2022    CKD (chronic kidney disease) stage 4, GFR 15-29 ml/min (HCC) 2022    Other ascites 2024    Acute respiratory failure (HCC) 2024    Cirrhosis of liver with ascites (HCC) 2024    Coagulopathy (HCC) 2024    Coffee ground emesis 2024    Nausea and vomiting 2024    Open wound of left hand without foreign body 2024    Health education/counseling 2024    Full code status 2024    GI bleed 2024    Adrenal insufficiency (HCC) 2024    
Nephrology Progress Note    Assessment:  ESRDX  Anema  Hx CVA  OHDX known CAD  Schizophrenia  Leucocytosis-relayed to steroids  DM type-2  Asymptomatic BP low  INR better- liver issue will review X-ray with radiology          Plan: continue IHD  check proclacitonin blood cultures today    Patient Active Problem List:     Coronary artery disease involving native coronary artery of native heart with angina pectoris (HCC)     Schizophrenia, paranoid, chronic     Metabolic syndrome     Vitamin B 12 deficiency     Cerebral microvascular disease     Mixed hyperlipidemia     Other hammer toe (acquired)     Vitamin D insufficiency     Incontinence of urine     Diabetic nephropathy with proteinuria (HCC)     Essential (primary) hypertension     History of type C viral hepatitis     Urinary incontinence due to cognitive impairment     History of seizures     Stented coronary artery-plan is to stay on Plavix indefinately per Dr Walker     Diabetes mellitus ([facility])     Controlled type 2 diabetes mellitus with diabetic neuropathy, with long-term current use of insulin (HCC)     Hemiparesis, left (HCC)     Angina, class II (HCC)     Pain, unspecified     Tardive dyskinesia     Shortness of breath     Uncontrolled type 2 diabetes mellitus with hyperglycemia (HCC)     Chronic diastolic congestive heart failure (HCC)     [place] (obstructive sleep apnea)     Pulmonary hypertension, unspecified (HCC)     Class 2 severe obesity with serious comorbidity and body mass index (BMI) of 36.0 to 36.9 in adult (HCC)     Edema     Closed supracondylar fracture of right humerus     Other chronic pain     Palliative care patient     Recurrent falls     Renal failure     Difficulty in walking     ESRD (end stage renal disease) on dialysis (HCC)     Weakness     Other seizures (HCC)     Moderate persistent asthma without complication     COVID-19     Post PTCA     Falls frequently     Chest wall contusion, left, initial encounter     Headache, 
Nephrology Progress Note    Assessment:  ESRDX  CAD  DM type-2  Hypotension low dose levophed  COPD  Leucocytosis        Plan: maintain dialysis treatments 3x week     Patient Active Problem List:     Coronary artery disease involving native coronary artery of native heart with angina pectoris (HCC)     Schizophrenia, paranoid, chronic     Metabolic syndrome     Vitamin B 12 deficiency     Cerebral microvascular disease     Mixed hyperlipidemia     Other hammer toe (acquired)     Vitamin D insufficiency     Incontinence of urine     Diabetic nephropathy with proteinuria (HCC)     Essential (primary) hypertension     History of type C viral hepatitis     Urinary incontinence due to cognitive impairment     History of seizures     Stented coronary artery-plan is to stay on Plavix indefinately per Dr Walker     Diabetes mellitus ([facility])     Controlled type 2 diabetes mellitus with diabetic neuropathy, with long-term current use of insulin ([facility])     Hemiparesis, left ([facility])     Angina, class II ([facility])     Pain, unspecified     Tardive dyskinesia     Shortness of breath     Poorly controlled diabetes mellitus ([facility])     Chronic diastolic congestive heart failure ([facility])     [place] (obstructive sleep apnea)     Pulmonary hypertension, unspecified ([facility])     Class 2 severe obesity with serious comorbidity and body mass index (BMI) of 36.0 to 36.9 in adult ([facility])     Edema     Closed supracondylar fracture of right humerus     Other chronic pain     Palliative care patient     Recurrent falls     Renal failure     Difficulty in walking     ESRD (end stage renal disease) on dialysis ([facility])     Weakness     Other seizures ([facility])     Moderate persistent asthma without complication     COVID-19     Post PTCA     Falls frequently     Chest wall contusion, left, initial encounter     Headache, unspecified     Paranoid schizophrenia (HCC)     Compression fracture of spine (HCC)     Closed rib fracture     Depression     Chronic obstructive 
Nephrology Progress Note    Assessment:  ESRDX  DM type-2  Hypertension  Hx Hepatitis-C  COPD  OHDx CAD        Plan:no new issues  dialysis today    Patient Active Problem List:     Coronary artery disease involving native coronary artery of native heart with angina pectoris (HCC)     Schizophrenia, paranoid, chronic     Metabolic syndrome     Vitamin B 12 deficiency     Cerebral microvascular disease     Mixed hyperlipidemia     Other hammer toe (acquired)     Vitamin D insufficiency     Incontinence of urine     Diabetic nephropathy with proteinuria (HCC)     Essential (primary) hypertension     History of type C viral hepatitis     Urinary incontinence due to cognitive impairment     History of seizures     Stented coronary artery-plan is to stay on Plavix indefinately per Dr Walker     Diabetes mellitus ([facility])     Controlled type 2 diabetes mellitus with diabetic neuropathy, with long-term current use of insulin ([facility])     Hemiparesis, left ([facility])     Angina, class II ([facility])     Pain, unspecified     Tardive dyskinesia     Shortness of breath     Uncontrolled type 2 diabetes mellitus with hyperglycemia ([facility])     Chronic diastolic congestive heart failure ([facility])     [place] (obstructive sleep apnea)     Pulmonary hypertension, unspecified ([facility])     Class 2 severe obesity with serious comorbidity and body mass index (BMI) of 36.0 to 36.9 in adult ([facility])     Edema     Closed supracondylar fracture of right humerus     Other chronic pain     Palliative care patient     Recurrent falls     Renal failure     Difficulty in walking     ESRD (end stage renal disease) on dialysis (HCC)     Weakness     Other seizures ([facility])     Moderate persistent asthma without complication     COVID-19     Post PTCA     Falls frequently     Chest wall contusion, left, initial encounter     Headache, unspecified     Paranoid schizophrenia (HCC)     Compression fracture of spine (HCC)     Closed rib fracture     Depression     Chronic obstructive 
Nephrology Progress Note    Assessment:  ESRDX  Hematemesis  Prolonged INR    3/29/2024  1.8 noted  Hx CVA  DM type-2  Hx Seizures  OHDx CAD      Plan: trying to find reason coumadin / on eliquis no warfarin  dialysis today  and EGD  give Vitamin -K 1mg oral or / 5mg sub-q ? Liver dusease    Patient Active Problem List:     Coronary artery disease involving native coronary artery of native heart with angina pectoris (HCC)     Schizophrenia, paranoid, chronic     Metabolic syndrome     Vitamin B 12 deficiency     Cerebral microvascular disease     Mixed hyperlipidemia     Other hammer toe (acquired)     Vitamin D insufficiency     Incontinence of urine     Diabetic nephropathy with proteinuria (HCC)     Essential (primary) hypertension     History of type C viral hepatitis     Urinary incontinence due to cognitive impairment     History of seizures     Stented coronary artery-plan is to stay on Plavix indefinately per Dr Walker     Diabetes mellitus (HCC)     Controlled type 2 diabetes mellitus with diabetic neuropathy, with long-term current use of insulin (HCC)     Hemiparesis, left (HCC)     Angina, class II (HCC)     Pain, unspecified     Tardive dyskinesia     Shortness of breath     Uncontrolled type 2 diabetes mellitus with hyperglycemia (HCC)     Chronic diastolic congestive heart failure (HCC)     [place] (obstructive sleep apnea)     Pulmonary hypertension, unspecified (HCC)     Class 2 severe obesity with serious comorbidity and body mass index (BMI) of 36.0 to 36.9 in adult (HCC)     Edema     Closed supracondylar fracture of right humerus     Other chronic pain     Palliative care patient     Recurrent falls     Renal failure     Difficulty in walking     ESRD (end stage renal disease) on dialysis (HCC)     Weakness     Other seizures (HCC)     Moderate persistent asthma without complication     COVID-19     Post PTCA     Falls frequently     Chest wall contusion, left, initial encounter     Headache, 
Nephrology Progress Note    Assessment:  ESRDX  Hypotesniod asymptomatic  Cirrhosis  DM type-2  Schizophrenia  [id number]      Plan: dialysis tuesday    Patient Active Problem List:     Coronary artery disease involving native coronary artery of native heart with angina pectoris (HCC)     Schizophrenia, paranoid, chronic     Metabolic syndrome     Vitamin B 12 deficiency     Cerebral microvascular disease     Mixed hyperlipidemia     Other hammer toe (acquired)     Vitamin D insufficiency     Incontinence of urine     Diabetic nephropathy with proteinuria (HCC)     Essential (primary) hypertension     History of type C viral hepatitis     Urinary incontinence due to cognitive impairment     History of seizures     Stented coronary artery-plan is to stay on Plavix indefinately per Dr Walker     Diabetes mellitus ([facility])     Controlled type 2 diabetes mellitus with diabetic neuropathy, with long-term current use of insulin ([facility])     Hemiparesis, left ([facility])     Angina, class II ([facility])     Pain, unspecified     Tardive dyskinesia     Shortness of breath     Uncontrolled type 2 diabetes mellitus with hyperglycemia ([facility])     Chronic diastolic congestive heart failure ([facility])     [place] (obstructive sleep apnea)     Pulmonary hypertension, unspecified ([facility])     Class 2 severe obesity with serious comorbidity and body mass index (BMI) of 36.0 to 36.9 in adult ([facility])     Edema     Closed supracondylar fracture of right humerus     Other chronic pain     Palliative care patient     Recurrent falls     Renal failure     Difficulty in walking     ESRD (end stage renal disease) on dialysis (HCC)     Weakness     Other seizures ([facility])     Moderate persistent asthma without complication     COVID-19     Post PTCA     Falls frequently     Chest wall contusion, left, initial encounter     Headache, unspecified     Paranoid schizophrenia (HCC)     Compression fracture of spine (HCC)     Closed rib fracture     Depression     Chronic 
Nephrology Progress Note    Assessment:  ESRDX  OHDx  CAD  Hypotension  Adrenal insufficiency  DM type-2  Hx Seizures  Hypoxia noted this AM  Thrombocytopenia/ Leucocytosis same        Plan:dialysis today  increase O2 delivery still with levo low dose     Patient Active Problem List:     Coronary artery disease involving native coronary artery of native heart with angina pectoris ([facility])     Schizophrenia, paranoid, chronic     Metabolic syndrome     Vitamin B 12 deficiency     Cerebral microvascular disease     Mixed hyperlipidemia     Other hammer toe (acquired)     Vitamin D insufficiency     Incontinence of urine     Diabetic nephropathy with proteinuria (HCC)     Essential (primary) hypertension     History of type C viral hepatitis     Urinary incontinence due to cognitive impairment     History of seizures     Stented coronary artery-plan is to stay on Plavix indefinately per Dr Walker     Diabetes mellitus ([facility])     Controlled type 2 diabetes mellitus with diabetic neuropathy, with long-term current use of insulin ([facility])     Hemiparesis, left ([facility])     Angina, class II ([facility])     Pain, unspecified     Tardive dyskinesia     Shortness of breath     Poorly controlled diabetes mellitus ([facility])     Chronic diastolic congestive heart failure ([facility])     [place] (obstructive sleep apnea)     Pulmonary hypertension, unspecified ([facility])     Class 2 severe obesity with serious comorbidity and body mass index (BMI) of 36.0 to 36.9 in adult ([facility])     Edema     Closed supracondylar fracture of right humerus     Other chronic pain     Palliative care patient     Recurrent falls     Renal failure     Difficulty in walking     ESRD (end stage renal disease) on dialysis ([facility])     Weakness     Other seizures ([facility])     Moderate persistent asthma without complication     COVID-19     Post PTCA     Falls frequently     Chest wall contusion, left, initial encounter     Headache, unspecified     Paranoid schizophrenia ([facility])     Compression 
Nephrology Progress Note    Assessment:  ESRDX  OHDx CAD HF  DM type-2  Hx prior CVA  Adrenal insufficiency  Hx Seizures      Plan: BP for her stable  O2 saturation perfect  had pericentesis     Patient Active Problem List:     Coronary artery disease involving native coronary artery of native heart with angina pectoris ([facility])     Schizophrenia, paranoid, chronic     Metabolic syndrome     Vitamin B 12 deficiency     Cerebral microvascular disease     Mixed hyperlipidemia     Other hammer toe (acquired)     Vitamin D insufficiency     Incontinence of urine     Diabetic nephropathy with proteinuria (HCC)     Essential (primary) hypertension     History of type C viral hepatitis     Urinary incontinence due to cognitive impairment     History of seizures     Stented coronary artery-plan is to stay on Plavix indefinately per Dr Walker     Diabetes mellitus ([facility])     Controlled type 2 diabetes mellitus with diabetic neuropathy, with long-term current use of insulin ([facility])     Hemiparesis, left ([facility])     Angina, class II ([facility])     Pain, unspecified     Tardive dyskinesia     Shortness of breath     Poorly controlled diabetes mellitus ([facility])     Chronic diastolic congestive heart failure ([facility])     [place] (obstructive sleep apnea)     Pulmonary hypertension, unspecified ([facility])     Class 2 severe obesity with serious comorbidity and body mass index (BMI) of 36.0 to 36.9 in adult ([facility])     Edema     Closed supracondylar fracture of right humerus     Other chronic pain     Palliative care patient     Recurrent falls     Renal failure     Difficulty in walking     ESRD (end stage renal disease) on dialysis ([facility])     Weakness     Other seizures ([facility])     Moderate persistent asthma without complication     COVID-19     Post PTCA     Falls frequently     Chest wall contusion, left, initial encounter     Headache, unspecified     Paranoid schizophrenia (HCC)     Compression fracture of spine ([facility])     Closed rib fracture     Depression    
PHARMACY NOTE  Michelle Le was ordered Trelegy Ellipta. Per the [facility] Formulary Committee Policy, this medication is non-formulary and was substituted with Budesonide-Formoterol 160/4.5 mcg 2 puffs BID + Spiriva Respimat 2.5 mcg 2 puffs daily.  
PHARMACY NOTE:   ICU Rounds Attended (10-15 minutes in patient room):    Pt diagnosis: Coagulopathy (HCC) [D68.9]  GI bleed [K92.2]  Gastrointestinal hemorrhage with melena [K92.1]    Follow up/changes:  Change(s) made per verbal from provider:  Increased midodrine to 20 mg TID WC  Added a one time dose of midodrine 5 mg to morning dose of 15 mg administered for a total morning dose of 20 mg  Follow up: Protonix and octreotide drip duration of therapy     Renal:   Recent Labs     04/17/24  0310   CREATININE 6.69*   Estimated Creatinine Clearance: 10 mL/min (A) (based on SCr of 6.69 mg/dL ()).      DVT Prophylaxis/Anticoagulant Therapy: None; GIB  Recent Labs     04/17/24 0310 04/17/24  0900   HGB 8.7* 8.6*   HCT 26.2* 25.8*     --    INR 5.3*  --        Stress Ulcer Prophylaxis:  [x] Pantoprazole  [] Famotidine    Steroid Therapy:   Day: 2   [] Solu-medrol    [x] Solu-Cortef    [] Prednisone   [] Decadron    Insulin Coverage (goal: 140-180):   HDSSI q4h  Recent Labs     04/16/24  0915 04/17/24  0310   GLUCOSE 300* 177*     Bowel Regimen: None  [] MiraLAX   [] Colace     Pressors:  [] Levophed  [] Epinephrine  [] Vasopressin  [] Matteo-Synephrine  [x] Midodrine 20 TID WC  Sedation:  [] Precedex  [] Fentanyl  [] Propofol  Drips:  [x] Protonix  [x] Octreotide     Antimicrobial Therapy:  ID on consult: NO  Day: 2  Antimicrobial agents:  Rocephin  Recent Labs     04/17/24  0310   WBC 14.0*     Recent Labs     03/29/24  1106   COVID19 Not Detected         Core measures assessed/met      Thank you,    Mariela Manley, PharmD  PGY1 Pharmacy Resident  4/17/2024 10:28 AM      
PHARMACY NOTE:   ICU Rounds Attended (10-15 minutes in patient room):    Pt diagnosis: GI bleed    IV Fluids: none   Renal:   Recent Labs     04/21/24  0600 04/22/24  0600 04/22/24  0820   CREATININE 5.23* 5.92* 6.0*    Estimated Creatinine Clearance: 11 mL/min (A) (based on SCr of 6 mg/dL (HH)).        Antimicrobial Therapy:   Day 3: vanco no LOT. Day7/7 rocephin-dc'd. Merrem added per below   Cultures blood cultures no growth   ID on consult: no    Recent Labs     04/20/24  0600 04/21/24  0600 04/22/24  0600   WBC 21.8* 15.5* 17.1*           Pressors:   norepinephrine @ 10 mcg/min     Insulin Therapy (goal: 140-180): medium SSI   4 units given past 24 hours     Recent Labs     04/20/24  0600 04/21/24  0600 04/22/24  0600   GLUCOSE 238* 206* 302*       Steroid Therapy: solu cortef 50mg q6h day 3  Stress Ulcer Prophylaxis:   Pantoprazole Drip    DVT Prophylaxis/Anticoagulant Therapy: none  Recent Labs     04/20/24  0600 04/21/24  0600 04/22/24  0600    175 198     Recent Labs     04/20/24  1026 04/21/24  0600 04/22/24  0600   INR 1.9 1.8 1.7       Bowel Regimen:   Bisacodyl PRN  Miralax BID  Reglan q6h    Follow up/Changes:   -intubation meds added per verbal: versed 4mg, fentanyl 50 mcg, etomidate 30mg, dottie 60mg, fentanyl and propofol drips  -changed levo MAP to 60-65 per verbal  -plan EGD today and HD today  -changed SSI to high SSI per verbal and ordered q4h interval  -dc rocephin change to merrem per verbal. 72 hour window added  -monitor solu-cortef LOT  -home meds reviewed/renewed     Leti Freeman [facility] PharmD  
Palliative Care Progress Note  Patient: Michelle Le  Gender: female  YOB: 1960  Unit/Bed: IC02/IC02-01  CodeStatus: Full Code  Inpatient Treatment Team: Treatment Team: Attending Provider: Chan Rai MD; Consulting Physician: Gabriella Almazan MD; Consulting Physician: Jorge Puentes MD; Consulting Physician: Carmen Pelletier MD; Consulting Physician: Kalia Guerrero MD; Consulting Physician: Ying Brice APRN - CNP; Consulting Physician: Say Marsh MD; Advanced Practice Nurse: Milagros Hanna APRN - CNP; Surgeon: Giovani Ron MD; : Irais Nath; Registered Nurse: Kayleigh Mackey RN; Utilization Reviewer: Della Mock RN  Admit Date:  4/16/2024    Chief Complaint: Rectal bleeding    History of Presenting Illness:      Michelle Le is a 64 y.o. female on hospital day 6 with a history of ESRD dialysis T/TH/S, HTN, afib on Eliquis, HLD, orhtostatic hypotension, cardiac arrest with ROSC, COPD, T2DM, Depression, anxiety, CAD prior PCI to LAD, CABG, tricuspid valve repair complicated by tamponade and pericardial window, CVA, seizures.    Patient was brought to the emergency room with rectal bleeding. Patient was admitted in the setting of GI bleed with acute blood loss in the setting of severely elevated INR with history of ESRD on HD.    Palliative care was consulted by provider Milagros Hanna CNP for goals of care.     Upon entering the room I find the patient A&O X4 with patient's daughter.  Patient was previously at LTAC.  Patient was not able to previously establish with Palliative Care as she was only in LTAC for approximately 3 days.  Per conversation with patient and patient's family patient has been previously nonadherent to medical treatment/recommendations and been increasingly sedentary while eating favorable foods.  Reviewed patient's current condition, hospice eligibility and answered all questions during interaction.  Reviewed concern for 
Palliative Care Progress Note  Patient: Michelle Le  Gender: female  YOB: 1960  Unit/Bed: IC02/IC02-01  CodeStatus: Full Code  Inpatient Treatment Team: Treatment Team: Attending Provider: Khalif Mir DO; Consulting Physician: Gabriella Almazan MD; Consulting Physician: Jorge Puentes MD; Consulting Physician: Carmen Pelletier MD; Consulting Physician: Kalia Guerrero MD; Consulting Physician: Ying Brice APRN - CNP; Consulting Physician: Say Marsh MD; Advanced Practice Nurse: Milagros Hanna APRN - CNP; Surgeon: Giovani Ron MD; Utilization Reviewer: Della Mock RN; : Bibiana Parham; : Irais Nath; Registered Nurse: Kayleigh Mackey RN  Admit Date:  4/16/2024    Chief Complaint: Rectal bleeding    History of Presenting Illness:      Michelle Le is a 64 y.o. female on hospital day 7 with a history of ESRD dialysis T/TH/S, HTN, afib on Eliquis, HLD, orhtostatic hypotension, cardiac arrest with ROSC, COPD, T2DM, Depression, anxiety, CAD prior PCI to LAD, CABG, tricuspid valve repair complicated by tamponade and pericardial window, CVA, seizures.    Patient was brought to the emergency room with rectal bleeding. Patient was admitted in the setting of GI bleed with acute blood loss in the setting of severely elevated INR with history of ESRD on HD.    Palliative care was consulted by provider Milagros Hanna CNP for goals of care.     Upon entering the room I find the patient A&O X4 with patient's daughter.  Patient was previously at LTAC.  Patient was not able to previously establish with Palliative Care as she was only in LTAC for approximately 3 days.  Per conversation with patient and patient's family patient has been previously nonadherent to medical treatment/recommendations and been increasingly sedentary while eating favorable foods.  Reviewed patient's current condition, hospice eligibility and answered all questions during interaction.  
Palliative Care Progress Note  Patient: Michelle Le  Gender: female  YOB: 1960  Unit/Bed: IC02/IC02-01  CodeStatus: Full Code  Inpatient Treatment Team: Treatment Team: Attending Provider: Khalif Mir DO; Consulting Physician: Gabriella Almazan MD; Consulting Physician: Jorge Puentes MD; Consulting Physician: Kalia Guerrero MD; Consulting Physician: Ying Brice APRN - CNP; Consulting Physician: Say Marsh MD; Advanced Practice Nurse: Milagros Hanna APRN - CNP; : Bibiana Parham; Consulting Physician: Andrei Nino MD; Consulting Physician: Autumn Rojo MD; Registered Nurse: Linda Cox, FLAQUITO; : Irais Nath; Utilization Reviewer: Analilia Lugo RN  Admit Date:  4/16/2024    Chief Complaint: Rectal bleeding    History of Presenting Illness:      Michelle Le is a 64 y.o. female on hospital day 9 with a history of ESRD dialysis T/TH/S, HTN, afib on Eliquis, HLD, orhtostatic hypotension, cardiac arrest with ROSC, COPD, T2DM, Depression, anxiety, CAD prior PCI to LAD, CABG, tricuspid valve repair complicated by tamponade and pericardial window, CVA, seizures.    Patient was brought to the emergency room with rectal bleeding. Patient was admitted in the setting of GI bleed with acute blood loss in the setting of severely elevated INR with history of ESRD on HD.    Palliative care was consulted by provider Milagros Hanna CNP for goals of care.     Upon entering the room I find the patient A&O X4 with patient's daughter.  Patient was previously at LTAC.  Patient was not able to previously establish with Palliative Care as she was only in LTAC for approximately 3 days.  Per conversation with patient and patient's family patient has been previously nonadherent to medical treatment/recommendations and been increasingly sedentary while eating favorable foods.  Reviewed patient's current condition, hospice eligibility and answered all questions during 
Palliative Care Progress Note  Patient: Michelle Le  Gender: female  YOB: 1960  Unit/Bed: IC11/IC11-01  CodeStatus: Full Code  Inpatient Treatment Team: Treatment Team: Attending Provider: Stephani Burton MD; Consulting Physician: Gabriella Almazan MD; Consulting Physician: Jorge Puentes MD; Consulting Physician: Kalia Guerrero MD; Consulting Physician: Ying Brice APRN - CNP; Consulting Physician: Say Marsh MD; Advanced Practice Nurse: Milagros Hanna APRN - CNP; : Bibiana Parham; Consulting Physician: Andrei Nino MD; Consulting Physician: Autumn Rojo MD; Wound/Ostomy Nurse: Riley Tom, RN; Registered Nurse: Ashley Benjamin, RN; Registered Nurse: Nevin Epps, RN  Admit Date:  4/16/2024    Chief Complaint: Rectal bleeding    History of Presenting Illness:      Michelle Le is a 64 y.o. female on hospital day 14 with a history of ESRD dialysis T/TH/S, HTN, afib on Eliquis, HLD, orhtostatic hypotension, cardiac arrest with ROSC, COPD, T2DM, Depression, anxiety, CAD prior PCI to LAD, CABG, tricuspid valve repair complicated by tamponade and pericardial window, CVA, seizures.    Patient was brought to the emergency room with rectal bleeding. Patient was admitted in the setting of GI bleed with acute blood loss in the setting of severely elevated INR with history of ESRD on HD.    Palliative care was consulted by provider Milagros Hanna CNP for goals of care.     Upon entering the room I find the patient A&O X4 with patient's daughter.  Patient was previously at LTAC.  Patient was not able to previously establish with Palliative Care as she was only in LTAC for approximately 3 days.  Per conversation with patient and patient's family patient has been previously nonadherent to medical treatment/recommendations and been increasingly sedentary while eating favorable foods.  Reviewed patient's current condition, hospice eligibility and answered all questions during 
Patient labs came back.   ferritin: 1331  iron: 120   TIBC:137  iron % saturation: 88  UIBC:17    Dr. Guerrero made aware via perfect serve per his request.     Electronically signed by Saranya Garrett RN on 4/18/2024 at 2:06 AM    
Patient more awake and oriented than previous shift.  Patient was able to answer name, birthday,  the day of the week, current month and year.  Patient was also able to tell this nurse who the current [place] president is.  Turn schedule maintained to protect skin integrity.  Patient POC Glucose performed as per orders with corresponding sliding scale insulin coverage as needed.  Episode x1 of decrease in B/P as Levo held for blood draw.  Levo titrated accordingly.  See flowsheets.    
Physical Therapy   Facility/Department: [facility] MED SURG IC02/IC02-01    NAME: Michelle Le    : 1960 (64 y.o.)  MRN: [id number]    Account: [id number]  Gender: female    Hold PT treatment on this date d/t patient had a change in medical status. Pt has been intubated.     Change in medical status discussed with supervising PT on this date.    Please re-consult physical therapy when appropriate.    Note written to attending hospitalist  via \"sticky note\" requesting updated Physical Therapy eval and treat orders when pt is appropriate for out of bed activity.      Regards,  [facility] Physical Therapy Department      Electronically signed by Kisha Parkinson PTA on 24 at 11:45 AM EDT          
Physical Therapy   Facility/Department: [facility] SURG IC02/IC02-01    NAME: Michelle Le    : 1960 (64 y.o.)  MRN: [id number]    Account: [id number]  Gender: female    Hold PT treatment on this date d/t patient had a change in medical status. Pt moved from W277 to IC02.    Change in medical status discussed with supervising PT on this date.    Please re-consult physical therapy when appropriate.    Note written to attending hospitalist  via \"sticky note\" requesting updated Physical Therapy eval and treat orders when pt is appropriate for out of bed activity.      Regards,  [facility] Physical Therapy Department      Electronically signed by Yovani Cage PTA on 24 at 7:34 AM EDT          
Physical Therapy  Facility/Department: [facility] MED SURG IC02/IC02-01  Physical Therapy Discharge      NAME: Michelle Le    : 1960 (64 y.o.)  MRN: [id number]    Account: [id number]  Gender: female      Patient has been discharged from 4 WT to ICU.  DC patient from current PT program.    New orders will be needed    Electronically signed by Irais Hernandez PT on 24 at 7:47 AM EDT  
Physical Therapy Med Surg Daily Treatment Note  Facility/Department: [address] MED SURG UNIT  Room: [place] [address]       NAME: Michelle Le  : 1960 (64 y.o.)  MRN: [id number]  CODE STATUS: Full Code    Date of Service: 2024    Patient Diagnosis(es): Coagulopathy (HCC) [D68.9]  GI bleed [K92.2]  Gastrointestinal hemorrhage with melena [K92.1]   Chief Complaint   Patient presents with    Rectal Bleeding     X1 week      Patient Active Problem List    Diagnosis Date Noted    Unstable angina (HCC) 2022    Chest pain     Infection of venous access port 2022    Lumbar stenosis with neurogenic claudication 07/15/2022    Falls infrequently 07/15/2022    Cervical stenosis of spinal canal     Ataxic gait     Chronic hepatitis C (HCC) 07/10/2022    Catheter-related bloodstream infection     Enterococcus faecalis infection     Sepsis due to skin infection (HCC) 2022    Sarcopenia 2022    Fall from standing     DJD (degenerative joint disease), cervical 2022    Closed head injury     MRSA bacteremia 2022    Sepsis due to Enterococcus ([facility])     Local infection due to central venous catheter     Impaired mobility and activities of daily living dt polyneuropathy and reccent fall  2022    Closed T11 fracture ([facility]) 2022    Encephalopathy acute 2022    Unspecified open wound of right upper arm, initial encounter 2022    Hyperkalemia 2022    CKD (chronic kidney disease) stage 4, GFR 15-29 ml/min ([facility]) 2022    Open wound of left hand without foreign body 2024    Health education/counseling 2024    Full code status 2024    GI bleed 2024    Adrenal insufficiency (HCC) 2024    Hypogammaglobulinemia (HCC) 2024    Lack of intravenous access 2024    Pressure injury, unstageable, with suspected deep tissue injury (HCC) 2024    Nonhealing nonsurgical wound 2024    Skin picking habit 2024    Hypotension 
Physical Therapy Med Surg Daily Treatment Note  [place] [facility]  Room: [place] [address]       NAME: Michelle Le  : 1960 (64 y.o.)  MRN: [id number]  CODE STATUS: Full Code    Date of Service: 2024    Patient Diagnosis(es): Coagulopathy (HCC) [D68.9]  GI bleed [K92.2]  Gastrointestinal hemorrhage with melena [K92.1]   Chief Complaint   Patient presents with    Rectal Bleeding     X1 week      Patient Active Problem List    Diagnosis Date Noted    Unstable angina (HCC) 2022    Chest pain     Infection of venous access port 2022    Lumbar stenosis with neurogenic claudication 07/15/2022    Falls infrequently 07/15/2022    Cervical stenosis of spinal canal     Ataxic gait     Chronic hepatitis C (HCC) 07/10/2022    Catheter-related bloodstream infection     Enterococcus faecalis infection     Sepsis due to skin infection (HCC) 2022    Sarcopenia 2022    Fall from standing     DJD (degenerative joint disease), cervical 2022    Closed head injury     MRSA bacteremia 2022    Sepsis due to Enterococcus (HCC)     Local infection due to central venous catheter     Impaired mobility and activities of daily living dt polyneuropathy and reccent fall  2022    Closed T11 fracture (HCC) 2022    Encephalopathy acute 2022    Unspecified open wound of right upper arm, initial encounter 2022    Hyperkalemia 2022    CKD (chronic kidney disease) stage 4, GFR 15-29 ml/min (HCC) 2022    Thrombocytopenia (HCC) 2024    Other ascites 2024    Acute respiratory failure (HCC) 2024    Cirrhosis of liver with ascites (HCC) 2024    Coagulopathy (HCC) 2024    Coffee ground emesis 2024    Nausea and vomiting 2024    Open wound of left hand without foreign body 2024    Health education/counseling 2024    Full code status 2024    GI bleed 2024    Adrenal insufficiency (HCC) 2024    
Physical Therapy Med Surg Daily Treatment Note  Facility/Department[place] [facility]  Room: [place] [address]       NAME: Michelle Le  : 1960 (64 y.o.)  MRN: [id number]  CODE STATUS: Full Code    Date of Service: 2024    Patient Diagnosis(es): Coagulopathy (HCC) [D68.9]  GI bleed [K92.2]  Gastrointestinal hemorrhage with melena [K92.1]   Chief Complaint   Patient presents with    Rectal Bleeding     X1 week      Patient Active Problem List    Diagnosis Date Noted    Unstable angina (HCC) 2022    Chest pain     Infection of venous access port 2022    Lumbar stenosis with neurogenic claudication 07/15/2022    Falls infrequently 07/15/2022    Cervical stenosis of spinal canal     Ataxic gait     Chronic hepatitis C (HCC) 07/10/2022    Catheter-related bloodstream infection     Enterococcus faecalis infection     Sepsis due to skin infection (HCC) 2022    Sarcopenia 2022    Fall from standing     DJD (degenerative joint disease), cervical 2022    Closed head injury     MRSA bacteremia 2022    Sepsis due to Enterococcus (HCC)     Local infection due to central venous catheter     Impaired mobility and activities of daily living dt polyneuropathy and reccent fall  2022    Closed T11 fracture (HCC) 2022    Encephalopathy acute 2022    Unspecified open wound of right upper arm, initial encounter 2022    Hyperkalemia 2022    CKD (chronic kidney disease) stage 4, GFR 15-29 ml/min (HCC) 2022    Thrombocytopenia (HCC) 2024    Other ascites 2024    Acute respiratory failure (HCC) 2024    Cirrhosis of liver with ascites (HCC) 2024    Coagulopathy (HCC) 2024    Coffee ground emesis 2024    Nausea and vomiting 2024    Open wound of left hand without foreign body 2024    Health education/counseling 2024    Full code status 2024    GI bleed 2024    Adrenal insufficiency (HCC) 2024    
Physical Therapy Med Surg Daily Treatment Note  [place] [facility]  Room: 02/02[facility]       NAME: Michelle Le  : 1960 (64 y.o.)  MRN: [id number]  CODE STATUS: Full Code    Date of Service: 2024    Patient Diagnosis(es): Coagulopathy (HCC) [D68.9]  GI bleed [K92.2]  Gastrointestinal hemorrhage with melena [K92.1]   Chief Complaint   Patient presents with    Rectal Bleeding     X1 week      Patient Active Problem List    Diagnosis Date Noted    Unstable angina (HCC) 2022    Chest pain     Infection of venous access port 2022    Lumbar stenosis with neurogenic claudication 07/15/2022    Falls infrequently 07/15/2022    Cervical stenosis of spinal canal     Ataxic gait     Chronic hepatitis C (HCC) 07/10/2022    Catheter-related bloodstream infection     Enterococcus faecalis infection     Sepsis due to skin infection (HCC) 2022    Sarcopenia 2022    Fall from standing     DJD (degenerative joint disease), cervical 2022    Closed head injury     MRSA bacteremia 2022    Sepsis due to Enterococcus ([facility])     Local infection due to central venous catheter     Impaired mobility and activities of daily living dt polyneuropathy and reccent fall  2022    Closed T11 fracture (HCC) 2022    Encephalopathy acute 2022    Unspecified open wound of right upper arm, initial encounter 2022    Hyperkalemia 2022    CKD (chronic kidney disease) stage 4, GFR 15-29 ml/min ([facility]) 2022    Open wound of left hand without foreign body 2024    Health education/counseling 2024    Full code status 2024    GI bleed 2024    Adrenal insufficiency (HCC) 2024    Hypogammaglobulinemia (HCC) 2024    Lack of intravenous access 2024    Pressure injury, unstageable, with suspected deep tissue injury (HCC) 2024    Nonhealing nonsurgical wound 2024    Skin picking habit 2024    Hypotension 2024    
Physical Therapy Med Surg Initial Assessment  Facility/Department: [facility]  Room: 02[facility]02[facility]       NAME: Michelle Le  : 1960 (64 y.o.)  MRN: [id number]  CODE STATUS: Full Code    Date of Service: 2024    Patient Diagnosis(es): Coagulopathy (HCC) [D68.9]  GI bleed [K92.2]  Gastrointestinal hemorrhage with melena [K92.1]   Chief Complaint   Patient presents with    Rectal Bleeding     X1 week      Patient Active Problem List    Diagnosis Date Noted    Unstable angina (HCC) 2022    Chest pain     Infection of venous access port 2022    Lumbar stenosis with neurogenic claudication 07/15/2022    Falls infrequently 07/15/2022    Cervical stenosis of spinal canal     Ataxic gait     Chronic hepatitis C (HCC) 07/10/2022    Catheter-related bloodstream infection     Enterococcus faecalis infection     Sepsis due to skin infection (HCC) 2022    Sarcopenia 2022    Fall from standing     DJD (degenerative joint disease), cervical 2022    Closed head injury     MRSA bacteremia 2022    Sepsis due to Enterococcus (HCC)     Local infection due to central venous catheter     Impaired mobility and activities of daily living dt polyneuropathy and reccent fall  2022    Closed T11 fracture (HCC) 2022    Encephalopathy acute 2022    Unspecified open wound of right upper arm, initial encounter 2022    Hyperkalemia 2022    CKD (chronic kidney disease) stage 4, GFR 15-29 ml/min (HCC) 2022    Other ascites 2024    Acute respiratory failure (HCC) 2024    Cirrhosis of liver with ascites (HCC) 2024    Coagulopathy (HCC) 2024    Coffee ground emesis 2024    Nausea and vomiting 2024    Open wound of left hand without foreign body 2024    Health education/counseling 2024    Full code status 2024    GI bleed 2024    Adrenal insufficiency (HCC) 2024    Hypogammaglobulinemia (HCC) 2024    
Physical Therapy Med Surg Initial Assessment  Facility/Department: [facility] [place] [facility]  Room: [place] [address]       NAME: Michelle Le  : 1960 (64 y.o.)  MRN: [id number]  CODE STATUS: Full Code    Date of Service: 2024    Patient Diagnosis(es): Coagulopathy (HCC) [D68.9]  GI bleed [K92.2]  Gastrointestinal hemorrhage with melena [K92.1]   Chief Complaint   Patient presents with    Rectal Bleeding     X1 week      Patient Active Problem List    Diagnosis Date Noted    Unstable angina (HCC) 2022    Chest pain     Infection of venous access port 2022    Lumbar stenosis with neurogenic claudication 07/15/2022    Falls infrequently 07/15/2022    Cervical stenosis of spinal canal     Ataxic gait     Chronic hepatitis C ([facility]) 07/10/2022    Catheter-related bloodstream infection     Enterococcus faecalis infection     Sepsis due to skin infection (HCC) 2022    Sarcopenia 2022    Fall from standing     DJD (degenerative joint disease), cervical 2022    Closed head injury     MRSA bacteremia 2022    Sepsis due to Enterococcus ([facility])     Local infection due to central venous catheter     Impaired mobility and activities of daily living dt polyneuropathy and reccent fall  2022    Closed T11 fracture ([facility]) 2022    Encephalopathy acute 2022    Unspecified open wound of right upper arm, initial encounter 2022    Hyperkalemia 2022    CKD (chronic kidney disease) stage 4, GFR 15-29 ml/min ([facility]) 2022    Health education/counseling 2024    Full code status 2024    GI bleed 2024    Adrenal insufficiency ([facility]) 2024    Hypogammaglobulinemia ([facility]) 2024    Lack of intravenous access 2024    Pressure injury, unstageable, with suspected deep tissue injury ([facility]) 2024    Nonhealing nonsurgical wound 2024    Skin picking habit 2024    Hypotension 2024    ESRD on dialysis (HCC) 2024    Acute decompensated 
Physical Therapy Missed Treatment   Facility/Department: [facility] MED SURG IC10/IC10-01    NAME: Michelle Le    : 1960 (64 y.o.)  MRN: [id number]    Account: [id number]  Gender: female      PT evaluation and treatment orders received. Chart reviewed. PT evaluation attempted. Pt having bedside hemodialysis. Nursing staff aware.      Will follow and attempt PT evaluation again at earliest availability.       Therese Landry, PT, 24 at 11:35 AM    
Physical Therapy Missed Treatment   Facility/Department: [facility] MED SURG IC02/IC02-01    NAME: Michelle Le    : 1960 (64 y.o.)  MRN: [id number]    Account: [id number]  Gender: female      Attempted to see pt for PT evaluation. Pt getting dialysis at this time.       Will follow and attempt PT evaluation again at earliest availability.       Steph Carrion, SPT, 24 at 2:03 PM  Direct supervision provided by Irais Hernandez, PT    
Progress Note  Date:2024       Room:[place] [address]  Patient Name:Michelle Le     YOB: 1960     Age:64 y.o.    Chief complaint adrenal insufficiency/uncontrolled diabetes    Subjective    Subjective:  Symptoms:  Stable.    Diet:  Poor intake.    Activity level: Impaired due to weakness.       Review of Systems  Objective         Vitals Last 24 Hours:  TEMPERATURE:  Temp  Av.8 °F (37.1 °C)  Min: 98.3 °F (36.8 °C)  Max: 99.2 °F (37.3 °C)  RESPIRATIONS RANGE: Resp  Av.6  Min: 10  Max: 36  PULSE OXIMETRY RANGE: SpO2  Av.7 %  Min: 74 %  Max: 100 %  PULSE RANGE: Pulse  Av.4  Min: 71  Max: 114  BLOOD PRESSURE RANGE: Systolic (24hrs), Av , Min:131 , Max:131   ; Diastolic (24hrs), Av, Min:57, Max:57    I/O (24Hr):    Intake/Output Summary (Last 24 hours) at 2024 2114  Last data filed at 2024 1651  Gross per 24 hour   Intake 2538.47 ml   Output 0 ml   Net 2538.47 ml     Objective:  General Appearance:  Ill-appearing.    Vital signs: (most recent): Blood pressure (!) 131/57, pulse 75, temperature 98.3 °F (36.8 °C), temperature source Axillary, resp. rate 30, height 1.702 m (5' 7\"), weight 94.4 kg (208 lb 1.8 oz), SpO2 (!) 81 %, not currently breastfeeding.  Vital signs are normal.    HEENT: Normal HEENT exam.    Lungs:  Normal effort.    Heart: Normal rate.    Abdomen: Abdomen is soft.    Extremities: Normal range of motion.  There is dependent edema.    Neurological: Patient is alert.    Skin:  There is ecchymosis.      Labs/Imaging/Diagnostics    Labs:  CBC:  Recent Labs     24  0516 24  0600 24  0511 24  1100   WBC 20.1* 22.2* 21.6*  --    RBC 3.08* 3.00* 2.85*  --    HGB 10.4* 10.2* 9.8* 9.8*   HCT 32.0* 31.0* 29.7* 29.6*   .9* 103.3* 104.2*  --    RDW 19.3* 18.7* 18.2*  --    PLT 86* 75* 65*  --      CHEMISTRIES:  Recent Labs     24  0515 24  0600 24  0510   * 137 134*   K 4.5 4.0 4.8   CL 91* 93* 91*   CO2 
Progress Note  Date:2024       [facility]  Patient Name:Michelle Le     YOB: 1960     Age:64 y.o.    Chief complaint adrenal insufficiency uncontrolled diabetes    Subjective    Subjective:  Symptoms:  Stable.  She reports malaise and weakness.    Diet:  Poor intake.    Activity level: Impaired due to weakness.    Pain:  She reports no pain.       Review of Systems   Neurological:  Positive for weakness.     Objective         Vitals Last 24 Hours:  TEMPERATURE:  Temp  Av.9 °F (36.6 °C)  Min: 97.6 °F (36.4 °C)  Max: 98.2 °F (36.8 °C)  RESPIRATIONS RANGE: Resp  Av.4  Min: 7  Max: 35  PULSE OXIMETRY RANGE: SpO2  Av.2 %  Min: 82 %  Max: 100 %  PULSE RANGE: Pulse  Av.7  Min: 75  Max: 95  BLOOD PRESSURE RANGE: Systolic (24hrs), Av , Min:99 , Max:115   ; Diastolic (24hrs), Av, Min:41, Max:44    I/O (24Hr):    Intake/Output Summary (Last 24 hours) at 2024 0946  Last data filed at 2024 0400  Gross per 24 hour   Intake 849.29 ml   Output 0 ml   Net 849.29 ml     Objective:  General Appearance:  Ill-appearing.    Vital signs: (most recent): Blood pressure (!) 115/42, pulse 92, temperature 98 °F (36.7 °C), temperature source Axillary, resp. rate 17, height 1.702 m (5' 7\"), weight 97.8 kg (215 lb 9.8 oz), SpO2 100 %, not currently breastfeeding.  Vital signs are normal.    HEENT: Normal HEENT exam.  (NG tube in place)    Heart: Normal rate.    Abdomen: Abdomen is soft.    Extremities: Normal range of motion.    Neurological: Patient is alert.    Skin:  There is ecchymosis.      Labs/Imaging/Diagnostics    Labs:  CBC:  Recent Labs     24  0423 24  0608 24  1044 24  0446 24  1211 24  0516   WBC 19.1*  --   --  21.7*  --  20.1*   RBC 2.84*  --   --  3.09*  --  3.08*   HGB 9.8*   < > 10.4* 10.5*  --  10.4*   HCT 29.7*  --   --  32.1*  --  32.0*   .6*  --   --  103.9*  --  103.9*   RDW 20.3*  --   --  20.0*  --  19.3* 
Progress Note  Date:2024       [facility]  Patient Name:Michelle Le     YOB: 1960     Age:64 y.o.    Chief complaint adrenal insufficiency/uncontrolled diabetes    Subjective    Subjective:  Symptoms:  Stable.  She reports weakness.    Diet:  Poor intake.    Activity level: Returning to normal.    Pain:  She reports no pain.       Review of Systems   Constitutional:  Positive for fatigue.   Neurological:  Positive for weakness.   All other systems reviewed and are negative.    Objective         Vitals Last 24 Hours:  TEMPERATURE:  Temp  Av.8 °F (36.6 °C)  Min: 97.5 °F (36.4 °C)  Max: 98.4 °F (36.9 °C)  RESPIRATIONS RANGE: Resp  Avg: 15.7  Min: 8  Max: 27  PULSE OXIMETRY RANGE: SpO2  Av.2 %  Min: 78 %  Max: 100 %  PULSE RANGE: Pulse  Av.2  Min: 70  Max: 96  BLOOD PRESSURE RANGE: Systolic (24hrs), Av , Min:113 , Max:144   ; Diastolic (24hrs), Av, Min:47, Max:65    I/O (24Hr):    Intake/Output Summary (Last 24 hours) at 2024 1928  Last data filed at 2024 1900  Gross per 24 hour   Intake 702.27 ml   Output 3400 ml   Net -2697.73 ml     Objective:  General Appearance:  Ill-appearing.    Vital signs: (most recent): Blood pressure (!) 144/65, pulse 95, temperature 97.7 °F (36.5 °C), temperature source Oral, resp. rate 21, height 1.702 m (5' 7\"), weight 97.8 kg (215 lb 9.8 oz), SpO2 97 %, not currently breastfeeding.  Vital signs are normal.    HEENT: Normal HEENT exam.    Lungs:  Normal effort.    Heart: Normal rate.    Abdomen: Abdomen is soft.    Extremities: Decreased range of motion.    Neurological: (Drowsy but following commands).    Skin:  There is ecchymosis.      Labs/Imaging/Diagnostics    Labs:  CBC:  Recent Labs     24  0559 24  1700 24  0513 24  1217 24  1614 24  0423 24  0608 24  1044   WBC 19.1*  --  21.8*  --   --  19.1*  --   --    RBC 2.81*  --  2.97*  --   --  2.84*  --   --    HGB 9.6*   < > 
Pt alert and calm, oriented to self, can tell me the date, the hospital she is in and sometimes why she is here. She understands why she has a corpak. Follows commands but poor safety awareness. Attempts to remove the corpak. Calls frequently using call bell. Partial linen change, skin assessed, no new wounds noted. Pt denies pain. Pt blood glucose trended down overnight. Pt handoff given to oncoming RNAshley RN  
Pt report received from Supriya HUDDLESTON. Pt alert and awake in bed. Pt seems tired but is interactive in her care. Calls appropriately.2100 medications given. This RN spoke with Dr. Suresh on the unit about recommendations from Dr. Guerrero. No new orders. Central line dressings changed. Pt tolerated well. Pt  linens changed. Medium formed bowel movement recorded.   
Pt transferred to ICU after rapid response for hypotension. Levo started on floor, patient on 9mcg/min upon arrival. Per Dr. Almazan, titrate to MAP goal 50 and/or SBP 90. Levo weaned off shortly after arrival. Otherwise stable with no complaints.   
Pulmonary & Critical Care Medicine ICU Progress Note    Subjective:     No overnight events reported.  She is currently resting comfortably in bed.  She states that she is trying to have a bowel movement.  She was asking for an enema or suppository to help her have a bowel movement.  She does remain nauseated and is having some emesis per report of the bedside staff.  She does remain on Levophed.  She denies any other complaints at this time.    EXAM:  General: No acute distress, resting comfortably in bed  HEENT: Normocephalic, atraumatic, pupils equal round reactive light  Lungs : Clear to auscultation anteriorly, no wheezes, no rales, no rhonchi, no respiratory distress  Heart: Tachycardic  ABD: Obese, positive bowel sounds, soft, nontender to palpation  Extremities : Warm, dry  Neuro: Awake, alert, oriented x 3  Skin: No rashes appreciated, multiple areas of ecchymosis noted, hemodialysis port in right chest      IV:   norepinephrine 12 mcg/min (04/20/24 0404)    sodium chloride      dextrose      pantoprazole (PROTONIX) 80 mg in sodium chloride 0.9 % 100 mL infusion 8 mg/hr (04/20/24 0404)       Vitals:  BP (!) 100/27   Pulse (!) 105   Temp 98.3 °F (36.8 °C) (Oral)   Resp 17   Ht 1.702 m (5' 7\")   Wt 96.9 kg (213 lb 10 oz)   LMP  (LMP Unknown)   SpO2 96%   BMI 33.46 kg/m²          Intake/Output Summary (Last 24 hours) at 4/20/2024 0773  Last data filed at 4/20/2024 0404  Gross per 24 hour   Intake 1022.05 ml   Output 2000 ml   Net -977.95 ml       Medications:  Scheduled Meds:   hydrocortisone sodium succinate PF  100 mg IntraVENous Q8H    sertraline  25 mg Oral Nightly    mupirocin   Topical BID    midodrine  20 mg Oral TID AC    sodium chloride flush  5-40 mL IntraVENous 2 times per day    insulin lispro  0-16 Units SubCUTAneous Q4H    ARIPiprazole  5 mg Oral Daily    atorvastatin  40 mg Oral Nightly    sevelamer  800 mg Oral TID    hydrOXYzine HCl  25 mg Oral BID    [Held by provider] amoxicillin  
Pulmonary & Critical Care Medicine ICU Progress Note    Subjective:     She did have a bowel movement yesterday.  This was noted to have blood in it per report of bedside staff.  She does remain on Levophed.  This is currently a 5.  She is without any other complaints this morning.  She no longer has any complaints of nausea.    EXAM:  General: No acute distress, resting comfortably in bed  HEENT: Normocephalic, atraumatic, pupils equal round reactive light  Lungs : Clear to auscultation anteriorly, no wheezes, no rales, no rhonchi, no respiratory distress  Heart: Regular rate  ABD: Obese, positive bowel sounds, soft, nontender to palpation  Extremities : Warm, dry  Neuro: Awake, alert, oriented x 3  Skin: No rashes appreciated, multiple areas of ecchymosis noted, hemodialysis port in right chest      IV:   norepinephrine 5 mcg/min (04/21/24 0605)    sodium chloride      dextrose      pantoprazole (PROTONIX) 80 mg in sodium chloride 0.9 % 100 mL infusion 8 mg/hr (04/21/24 0605)       Vitals:  BP (!) 110/35   Pulse 84   Temp 97 °F (36.1 °C) (Oral)   Resp 17   Ht 1.702 m (5' 7\")   Wt 96.9 kg (213 lb 10 oz)   LMP  (LMP Unknown)   SpO2 98%   BMI 33.46 kg/m²          Intake/Output Summary (Last 24 hours) at 4/21/2024 0732  Last data filed at 4/21/2024 0605  Gross per 24 hour   Intake 1694.48 ml   Output 50 ml   Net 1644.48 ml       Medications:  Scheduled Meds:   insulin lispro  0-8 Units SubCUTAneous TID WC    insulin lispro  0-4 Units SubCUTAneous Nightly    hydrocortisone sodium succinate PF  50 mg IntraVENous Q6H    vancomycin (VANCOCIN) intermittent dosing (placeholder)   Other RX Placeholder    metoclopramide  10 mg IntraVENous Q6H    sertraline  25 mg Oral Nightly    mupirocin   Topical BID    midodrine  20 mg Oral TID AC    sodium chloride flush  5-40 mL IntraVENous 2 times per day    ARIPiprazole  5 mg Oral Daily    atorvastatin  40 mg Oral Nightly    sevelamer  800 mg Oral TID    hydrOXYzine HCl  25 mg 
Pulmonary & Critical Care Medicine ICU Progress Note  Chief complaint : Shock    Subjunctive/24 hour events :   Patient seen and examined during multidisciplinary rounds with RN, charge nurse, RT, pharmacy, dietitian, and social service.        Sedated on vent, currently on Levophed at 8 mg/min, no fever, she is on 40% FiO2 saturation 100%, no urine output dialysis output 2400 cc, +1.7 L.no Bowel movement, tolerates tube feed, no vomiting        New information updated in the note today, rest of the examination did not change compared to yesterday.  Social History     Tobacco Use    Smoking status: Never     Passive exposure: Never    Smokeless tobacco: Never   Substance Use Topics    Alcohol use: No     Alcohol/week: 0.0 standard drinks of alcohol         Problem Relation Age of Onset    Cancer Mother 68        survived    Breast Cancer Mother     Hypertension Father     Diabetes Sister     Mental Illness Sister     Colon Cancer Neg Hx        Recent Labs     04/22/24  0820 04/22/24  1140   PHART 7.389 7.373   [id number] 26* 32*   PO2ART 83* 272*       MV Settings:  Vent Mode: AC/VC Resp Rate (Set): 14 bpm/Vt (Set, mL): 370 mL/ /FiO2 : 35 %           IV:   norepinephrine 8 mcg/min (04/23/24 0758)    propofol 40 mcg/kg/min (04/23/24 0758)    fentaNYL 100 mcg/hr (04/23/24 0758)    sodium chloride      sodium chloride      sodium chloride      dextrose      pantoprazole (PROTONIX) 80 mg in sodium chloride 0.9 % 100 mL infusion 8 mg/hr (04/23/24 0758)       Vitals:  BP (!) 91/48   Pulse 92   Temp 97 °F (36.1 °C) (Axillary)   Resp (!) 41   Ht 1.702 m (5' 7\")   Wt 96.9 kg (213 lb 10 oz)   LMP  (LMP Unknown)   SpO2 100%   BMI 33.46 kg/m²    Tmax:        Intake/Output Summary (Last 24 hours) at 4/23/2024 0843  Last data filed at 4/23/2024 0758  Gross per 24 hour   Intake 2181.57 ml   Output 2400 ml   Net -218.43 ml         EXAM:  General: Sedated on vent  Head: normocephalic, atraumatic  Eyes:No gross 
Pulmonary & Critical Care Medicine ICU Progress Note  Chief complaint : Shock    Subjunctive/24 hour events :   Patient seen and examined during multidisciplinary rounds with RN, charge nurse, RT, pharmacy, dietitian, and social service.      Awake, weak but answers questions, she is on Levophed at 5 mg/min, no fever, no,+ bowel movement pain, no nausea or vomiting, she is on 35% FiO2 saturation 99%    New information updated in the note today, rest of the examination did not change compared to yesterday.  Social History     Tobacco Use    Smoking status: Never     Passive exposure: Never    Smokeless tobacco: Never   Substance Use Topics    Alcohol use: No     Alcohol/week: 0.0 standard drinks of alcohol         Problem Relation Age of Onset    Cancer Mother 68        survived    Breast Cancer Mother     Hypertension Father     Diabetes Sister     Mental Illness Sister     Colon Cancer Neg Hx        Recent Labs     04/24/24  1217 04/25/24  0608   PHART 7.378 7.350   [id number] 38 45   PO2ART 164* 176*         MV Settings:  Vent Mode: CPAP Resp Rate (Set): 14 bpm/Vt (Set, mL): 370 mL/ /FiO2 : 35 %           IV:   norepinephrine 5 mcg/min (04/25/24 0838)    sodium chloride      sodium chloride      sodium chloride      dextrose         Vitals:  BP (!) 113/47   Pulse 91   Temp 97.5 °F (36.4 °C) (Oral)   Resp 17   Ht 1.702 m (5' 7\")   Wt 97.8 kg (215 lb 9.8 oz)   LMP  (LMP Unknown)   SpO2 99%   BMI 33.77 kg/m²    Tmax:        Intake/Output Summary (Last 24 hours) at 4/25/2024 0856  Last data filed at 4/25/2024 0838  Gross per 24 hour   Intake 332.24 ml   Output --   Net 332.24 ml         EXAM:  General: Awake, no distress  Head: normocephalic, atraumatic  Eyes:No gross abnormalities.  ENT:  MMM no lesions  Neck:  supple and no masses  Chest : Diminished air movement, no wheezing, minimal basal rales, nontender, tympanic  Heart:: Heart sounds are normal.  Regular rate and rhythm without murmur, gallop or rub.  ABD: 
Pulmonary & Critical Care Medicine ICU Progress Note  Chief complaint : Shock    Subjunctive/24 hour events :   Patient seen and examined during multidisciplinary rounds with RN, charge nurse, RT, pharmacy, dietitian, and social service.      More awake and interactive, no fever, she is on 8 mg/min of Levophed,  she is on 3 L O2 saturation 99%.  No nausea or vomiting.  No abdominal pain    New information updated in the note today, rest of the examination did not change compared to yesterday.  Social History     Tobacco Use    Smoking status: Never     Passive exposure: Never    Smokeless tobacco: Never   Substance Use Topics    Alcohol use: No     Alcohol/week: 0.0 standard drinks of alcohol         Problem Relation Age of Onset    Cancer Mother 68        survived    Breast Cancer Mother     Hypertension Father     Diabetes Sister     Mental Illness Sister     Colon Cancer Neg Hx        Recent Labs     04/25/24  1044   PHART 7.387   [id number] 39   PO2ART 140*         MV Settings:  Vent Mode: CPAP Resp Rate (Set): 14 bpm/Vt (Set, mL): 370 mL/ /FiO2 : 35 %           IV:   norepinephrine 8 mcg/min (04/28/24 0709)    sodium chloride      sodium chloride      dextrose         Vitals:  /70   Pulse 95   Temp 99.4 °F (37.4 °C) (Oral)   Resp 11   Ht 1.702 m (5' 7\")   Wt 97.8 kg (215 lb 9.8 oz)   LMP  (LMP Unknown)   SpO2 98%   BMI 33.77 kg/m²    Tmax:        Intake/Output Summary (Last 24 hours) at 4/28/2024 0927  Last data filed at 4/28/2024 0709  Gross per 24 hour   Intake 1275.86 ml   Output 3500 ml   Net -2224.14 ml         EXAM:  General: Awake, no distress  Head: normocephalic, atraumatic  Eyes:No gross abnormalities.  ENT:  MMM no lesions  Neck:  supple and no masses  Chest : Diminished air movement, no wheezing, minimal basal rales, nontender, tympanic  Heart:: Heart sounds are normal.  Regular rate and rhythm without murmur, gallop or rub.  ABD:  symmetric, soft, non-tender, no guarding or 
Pulmonary & Critical Care Medicine ICU Progress Note  Chief complaint : Shock    Subjunctive/24 hour events :   Patient seen and examined during multidisciplinary rounds with RN, charge nurse, RT, pharmacy, dietitian, and social service.      On vent, open her eyes, follows simple commands, she is on 9 mcg/min of Levophed, no fever, on 35% FiO2 saturation 99%.no Bowel movement, no vomiting      New information updated in the note today, rest of the examination did not change compared to yesterday.  Social History     Tobacco Use    Smoking status: Never     Passive exposure: Never    Smokeless tobacco: Never   Substance Use Topics    Alcohol use: No     Alcohol/week: 0.0 standard drinks of alcohol         Problem Relation Age of Onset    Cancer Mother 68        survived    Breast Cancer Mother     Hypertension Father     Diabetes Sister     Mental Illness Sister     Colon Cancer Neg Hx        Recent Labs     04/22/24  0820 04/22/24  1140   PHART 7.389 7.373   [id number] 26* 32*   PO2ART 83* 272*         MV Settings:  Vent Mode: AC/VC Resp Rate (Set): 14 bpm/Vt (Set, mL): 370 mL/ /FiO2 : 35 %           IV:   norepinephrine 9 mcg/min (04/24/24 0800)    sodium chloride      sodium chloride      sodium chloride      dextrose         Vitals:  BP (!) 123/50   Pulse 91   Temp 97.6 °F (36.4 °C) (Axillary)   Resp 21   Ht 1.702 m (5' 7\")   Wt 97.8 kg (215 lb 9.8 oz)   LMP  (LMP Unknown)   SpO2 99%   BMI 33.77 kg/m²    Tmax:        Intake/Output Summary (Last 24 hours) at 4/24/2024 0847  Last data filed at 4/24/2024 0800  Gross per 24 hour   Intake 504.42 ml   Output --   Net 504.42 ml         EXAM:  General: Sedated on vent, opens her eyes, follows simple commands  Head: normocephalic, atraumatic  Eyes:No gross abnormalities.  ENT:  MMM no lesions  Neck:  supple and no masses  Chest : clear to auscultation bilaterally- no wheezes, rales or rhonchi, normal air movement, no respiratory distress  Heart:: Heart sounds are 
Pulmonary & Critical Care Medicine ICU Progress Note  Chief complaint : Shock    Subjunctive/24 hour events :   Patient seen and examined during multidisciplinary rounds with RN, charge nurse, RT, pharmacy, dietitian, and social service.      Sleepy, wakes up on, follows simple command, less interactive, no fever, she is on 6 L O2 saturation 100%, she is on Levophed at 6 mg/min, no vomiting, bowels moving.    New information updated in the note today, rest of the examination did not change compared to yesterday.  Social History     Tobacco Use    Smoking status: Never     Passive exposure: Never    Smokeless tobacco: Never   Substance Use Topics    Alcohol use: No     Alcohol/week: 0.0 standard drinks of alcohol         Problem Relation Age of Onset    Cancer Mother 68        survived    Breast Cancer Mother     Hypertension Father     Diabetes Sister     Mental Illness Sister     Colon Cancer Neg Hx        Recent Labs     04/25/24  0608 04/25/24  1044   PHART 7.350 7.387   RCW8SHP 45 39   PO2ART 176* 140*         MV Settings:  Vent Mode: CPAP Resp Rate (Set): 14 bpm/Vt (Set, mL): 370 mL/ /FiO2 : 35 %           IV:   norepinephrine 6 mcg/min (04/26/24 6305)    sodium chloride      sodium chloride      dextrose         Vitals:  BP (!) 115/42   Pulse 92   Temp 98 °F (36.7 °C) (Axillary)   Resp 17   Ht 1.702 m (5' 7\")   Wt 97.8 kg (215 lb 9.8 oz)   LMP  (LMP Unknown)   SpO2 100%   BMI 33.77 kg/m²    Tmax:        Intake/Output Summary (Last 24 hours) at 4/27/2024 0805  Last data filed at 4/27/2024 0400  Gross per 24 hour   Intake 849.29 ml   Output 0 ml   Net 849.29 ml         EXAM:  General: Awake, no distress  Head: normocephalic, atraumatic  Eyes:No gross abnormalities.  ENT:  MMM no lesions  Neck:  supple and no masses  Chest : Diminished air movement, no wheezing, minimal basal rales, nontender, tympanic  Heart:: Heart sounds are normal.  Regular rate and rhythm without murmur, gallop or rub.  ABD:  
Pulmonary & Critical Care Medicine ICU Progress Note  Chief complaint : Shock    Subjunctive/24 hour events :   Patient seen and examined during multidisciplinary rounds with RN, charge nurse, RT, pharmacy, dietitian, and social service.   Patient encephalopathic, she opens her eyes, did not answer questions, appears weak and tired, she is on Levophed at 6 mcg/min, MAP in the 50s, no fever, no reported vomiting, no active bleed, fluid balance +2 L.        New information updated in the note today, rest of the examination did not change compared to yesterday.  Social History     Tobacco Use    Smoking status: Never     Passive exposure: Never    Smokeless tobacco: Never   Substance Use Topics    Alcohol use: No     Alcohol/week: 0.0 standard drinks of alcohol         Problem Relation Age of Onset    Cancer Mother 68        survived    Breast Cancer Mother     Hypertension Father     Diabetes Sister     Mental Illness Sister     Colon Cancer Neg Hx        No results for input(s): \"PHART\", \"EIN8LTC\", \"PO2ART\" in the last 72 hours.    MV Settings:     / / /            IV:   norepinephrine 6 mcg/min (04/22/24 0611)    sodium chloride      dextrose      pantoprazole (PROTONIX) 80 mg in sodium chloride 0.9 % 100 mL infusion 8 mg/hr (04/22/24 0611)       Vitals:  BP (!) 105/23   Pulse 79   Temp 98 °F (36.7 °C) (Oral)   Resp 15   Ht 1.702 m (5' 7\")   Wt 96.9 kg (213 lb 10 oz)   LMP  (LMP Unknown)   SpO2 99%   BMI 33.46 kg/m²    Tmax:        Intake/Output Summary (Last 24 hours) at 4/22/2024 0804  Last data filed at 4/22/2024 0611  Gross per 24 hour   Intake 2558.04 ml   Output --   Net 2558.04 ml       EXAM:  General: Encephalopathic, weak, no distress  Head: normocephalic, atraumatic  Eyes:No gross abnormalities.  ENT:  MMM no lesions  Neck:  supple and no masses  Chest : clear to auscultation bilaterally- no wheezes, rales or rhonchi, normal air movement, no respiratory distress  Heart:: Heart sounds are normal.  
Pulmonary & Critical Care Medicine ICU Progress Note  Chief complaint : hypotension     Subjunctive/24 hour events :   Patient seen and examined during multidisciplinary rounds with RN, charge nurse, RT, pharmacy, dietitian, and social service.   Patient had an uneventful night. She is currently on 2 liters nasal canula and O2 sats are 97%. Levophed is infusing at 2 mcg/min. No fever overnight and anuric, dialysis tues, thurs and Saturday. She is tolerating a po diet and bowels are moving.       Social History     Tobacco Use    Smoking status: Never     Passive exposure: Never    Smokeless tobacco: Never   Substance Use Topics    Alcohol use: No     Alcohol/week: 0.0 standard drinks of alcohol         Problem Relation Age of Onset    Cancer Mother 68        survived    Breast Cancer Mother     Hypertension Father     Diabetes Sister     Mental Illness Sister     Colon Cancer Neg Hx        No results for input(s): \"PHART\", \"XXO8CRZ\", \"PO2ART\" in the last 72 hours.    MV Settings:  Vent Mode: CPAP Resp Rate (Set): 14 bpm/Vt (Set, mL): 370 mL/ /FiO2 : 35 %           IV:   norepinephrine 4 mcg/min (04/29/24 1222)    sodium chloride      sodium chloride      dextrose         Vitals:  BP (!) 131/57   Pulse 97   Temp 98 °F (36.7 °C) (Axillary)   Resp 27   Ht 1.702 m (5' 7\")   Wt 94.9 kg (209 lb 3.5 oz)   LMP  (LMP Unknown)   SpO2 97%   BMI 32.77 kg/m²    Tmax:       Intake/Output Summary (Last 24 hours) at 4/30/2024 1029  Last data filed at 4/30/2024 0200  Gross per 24 hour   Intake 887.95 ml   Output 0 ml   Net 887.95 ml         EXAM:    General: alert, fatigued  Head: normocephalic, atraumatic  Eyes:No gross abnormalities.  ENT:  MMM no lesions  Neck:  supple and no masses  Chest : Fair air movement no rales no wheezes   Heart:: Heart sounds are normal.  Regular rate and rhythm without murmur, gallop or rub.  ABD:  bowel sounds normal, soft, non-tender  Musculoskeletal : no cyanosis, no clubbing, and no 
Pulmonary & Critical Care Medicine ICU Progress Note  Chief complaint : hypotension     Subjunctive/24 hour events :   Patient seen and examined during multidisciplinary rounds with RN, charge nurse, RT, pharmacy, dietitian, and social service.   Patient had an uneventful night. She is currently on 3 liters nasal canula and O2 sats are 96%. Levophed is infusing at 4 mcg/min. No fever overnight and anuric, dialysis tues, thurs and Saturday. She is able to have a po diet but still requiring tube feeding. Bowels are moving.       Social History     Tobacco Use    Smoking status: Never     Passive exposure: Never    Smokeless tobacco: Never   Substance Use Topics    Alcohol use: No     Alcohol/week: 0.0 standard drinks of alcohol         Problem Relation Age of Onset    Cancer Mother 68        survived    Breast Cancer Mother     Hypertension Father     Diabetes Sister     Mental Illness Sister     Colon Cancer Neg Hx        No results for input(s): \"PHART\", \"UKO1FGV\", \"PO2ART\" in the last 72 hours.    MV Settings:  Vent Mode: CPAP Resp Rate (Set): 14 bpm/Vt (Set, mL): 370 mL/ /FiO2 : 35 %           IV:   norepinephrine      sodium chloride      sodium chloride      dextrose         Vitals:  BP (!) 131/57   Pulse 92   Temp 98.9 °F (37.2 °C) (Oral)   Resp 20   Ht 1.702 m (5' 7\")   Wt 94.4 kg (208 lb 1.8 oz)   LMP  (LMP Unknown)   SpO2 96%   BMI 32.60 kg/m²    Tmax:       Intake/Output Summary (Last 24 hours) at 4/29/2024 1114  Last data filed at 4/29/2024 0803  Gross per 24 hour   Intake 2007.86 ml   Output 0 ml   Net 2007.86 ml       EXAM:    General: alert, fatigued  Head: normocephalic, atraumatic  Eyes:No gross abnormalities.  ENT:  MMM no lesions  Neck:  supple and no masses  Chest : Fair air movement no rales no wheezes   Heart:: Heart sounds are normal.  Regular rate and rhythm without murmur, gallop or rub.  ABD:  bowel sounds normal, soft, non-tender  Musculoskeletal : no cyanosis, no clubbing, and no 
RT DECREASED OXYGEN FROM 6L BUBBLER TO REGULAR NASAL CANNULA 6L-SPO2 100%  
Renal Progress Note    Assessment and Plan:      64-year-old lady with end-stage renal disease on hemodialysis Tuesday Thursday Saturday.  Chronic hypotension maintained on large dose midodrine.  Admitted with SOB last time and was sig above dry weight.  Transferred to [facility] on levo.  Doesn't make urine.  EF is normal.   Her DW is at 97 kg as outpt.  Transferred back with bleeding.  On eliquis.  INR > 10.      Plan/  1-HD TTHSa for now.  UF as tolerated.  Wean levo as tolerated       Patient Active Problem List:     Coronary artery disease involving native coronary artery of native heart with angina pectoris (HCC)     Schizophrenia, paranoid, chronic     Metabolic syndrome     Vitamin B 12 deficiency     Cerebral microvascular disease     Mixed hyperlipidemia     Other hammer toe (acquired)     Vitamin D insufficiency     Incontinence of urine     Diabetic nephropathy with proteinuria (HCC)     Essential (primary) hypertension     History of type C viral hepatitis     Urinary incontinence due to cognitive impairment     History of seizures     Stented coronary artery-plan is to stay on Plavix indefinately per Dr Walker     Diabetes mellitus ([facility])     Controlled type 2 diabetes mellitus with diabetic neuropathy, with long-term current use of insulin ([facility])     Hemiparesis, left ([facility])     Angina, class II (HCC)     Pain, unspecified     Tardive dyskinesia     Shortness of breath     Uncontrolled type 2 diabetes mellitus with hyperglycemia ([facility])     Chronic diastolic congestive heart failure ([facility])     [place] (obstructive sleep apnea)     Pulmonary hypertension, unspecified ([facility])     Class 2 severe obesity with serious comorbidity and body mass index (BMI) of 36.0 to 36.9 in adult ([facility])     Edema     Closed supracondylar fracture of right humerus     Other chronic pain     Palliative care patient     Recurrent falls     Renal failure     Difficulty in walking     ESRD (end stage renal disease) on dialysis (HCC)     Weakness    
Renal Progress Note    Assessment and Plan:      64-year-old lady with end-stage renal disease on hemodialysis Tuesday Thursday Saturday.  Chronic hypotension maintained on large dose midodrine.  Admitted with SOB last time and was sig above dry weight.  Transferred to [facility] on levo.  Doesn't make urine.  EF is normal.   Her DW is at 97 kg as outpt.  Transferred back with bleeding.  On eliquis.  INR > 10.      Plan/  1-her HD schedule was thrown off this week.  Got HD wed and to get today.  Can do HD Monday and then T/Sa next week.    2. Her fluid status looks better.  About at dry weight.  Bp is an issue.  Try for 3 liters today  3. Renvela  4. D/c lokelma       Patient Active Problem List:     Coronary artery disease involving native coronary artery of native heart with angina pectoris ([facility])     Schizophrenia, paranoid, chronic     Metabolic syndrome     Vitamin B 12 deficiency     Cerebral microvascular disease     Mixed hyperlipidemia     Other hammer toe (acquired)     Vitamin D insufficiency     Incontinence of urine     Diabetic nephropathy with proteinuria ([facility])     Essential (primary) hypertension     History of type C viral hepatitis     Urinary incontinence due to cognitive impairment     History of seizures     Stented coronary artery-plan is to stay on Plavix indefinately per Dr Walker     Diabetes mellitus ([facility])     Controlled type 2 diabetes mellitus with diabetic neuropathy, with long-term current use of insulin ([facility])     Hemiparesis, left ([facility])     Angina, class II ([facility])     Pain, unspecified     Tardive dyskinesia     Shortness of breath     Uncontrolled type 2 diabetes mellitus with hyperglycemia ([facility])     Chronic diastolic congestive heart failure ([facility])     [place] (obstructive sleep apnea)     Pulmonary hypertension, unspecified ([facility])     Class 2 severe obesity with serious comorbidity and body mass index (BMI) of 36.0 to 36.9 in adult ([facility])     Edema     Closed supracondylar fracture of right 
Shift Summary     Patient had uneventful night. NPO after 4am for procedure. Anuric. Took fluids well prior to NPO status. No nausea or emesis during night. Laxative given with HS meds. No Bowel movement during this shift. See flow sheets for assessments and MAR for meds. Side rails up times 2 bed alarm on call bell within reach.   
Shift Summary     Patient had uneventful night. See flow sheets for assessments and MAR for meds. Patient remains on levophed gtt for bp. Patient has Right Subclavian double lumen gomez cath. Dressing clean dry and intact. IV fluid infusing per blue port. Am labs drawn Both lumens with good blood return. At 0330 Patient had small emesis, medicated with Zofran 4mg IVP. Patient is anuric. Patient is NPO Patient can have ice chips and sips of water with meds. Patient took po meds whole with sip of water without problem.  
Shift Summary    Patient had uneventful night. Patient had Bowel movement per bed pan. Stool was loose and red in color. Patient anuric. Patient tolerated clear liquid diet during night No episodes of emesis. See flow sheets for assessment and MAR for meds. Patient remains a low dose of levophed. Side rails up times 2 call bell within reach.  
Shift Summary:  [id number]  Bedside report received from Ashley Benjamin Rn. Patient on 4 liters/min nasal canula, patient opens eyes to voice, follows simple commands, patient is weak in all 4 extremities, patient alert to year, place, and self.   PO medications held overnight patient not alert enough for PO,  made aware.  0445:This Rn notified that patient remains, sleepy/lethargic throughout assessment Blood gas ordered by .  0600:Blood gas complete,  aware of labs.  0700:Bedside report given to Linda Cox RN.    
Shift Summary:  Patient remains lethargic, more interactive overnight, oriented to time self, and place. Patient tolerated TF overnight with no complaints of nausea, 1 small brown soft bowel movement overnight, afebrile. Patient wanted this RN to call daughter Angelina to see if she is visiting her in the AM, this RN spoke with daughter over the phone who confirmed she will be in to see patient. Patient hair washed overnight, frequent oral care done due to TF and NPO status, patient requesting ice and water at times but reminded due to corpak in place not yet and speech will see her in the morning to evaluate.   0615:Spoke with  over the phone regarding patient's down trending platelet level, order for APTT and fibrinogen level to be run, and to draw a platelet level around 1pm.   0700: Bedside handoff report given to Linda Cox RN, patient had medium brown bowel movement at this time, incontinence care complete and linens changed.  
Shift summary:    1900 Bedside report received from Kayleigh HUDDLESTON.     2000 Shift assessment completed, see flowsheets. Pt is alert and oriented x4 and able to follow commands. Pt currently receiving Sandostatin and Protonix continuously IV.     2100 Pt BP= 98/41, MAP= 58. Notified Dr. Hendrix. No new orders at this time.    2100 Notified Dr. Guerrero of pt's H/H of 8.7 and 26.9 No new orders at this time.     0400 Notified Dr. Hendrix of pt's elevated INR of 5.3 and K= 5.7 No new orders at this time. Pt was bathed with CHG solution, linens and electrodes changed. Pt tolerated it well.     Electronically signed by Nevin Cage RN on 4/17/2024 at 6:36 AM   
Shift summary:    1900 Bedside report received from Ying HUDDLESTON.    2230 New order placed for pt to transfer to [address] [place] [address]. Report called to RN. Belongings gathered. Telemetry monitor placed and rhythm confirmed with monitor room tech.     Electronically signed by Nevin Cage RN on 4/17/2024 at 11:16 PM   
Shift summary:  Pt. Remains lethargic, wakes up more after receiving Provigil, but still sleepy throughout day. Pt. Wakes up and follows commands. Oriented to self and place, speech remains garbled at times. Pt. More awake when family is at bedside. Pt. Advanced to pureed and thickened liquid diet when awake, needs total assist. TF to remain infusing per orders through corpak. Tolerating TF, no c/o nausea and no episodes of emesis. Pt. Had multiple loose stools today. Pt. On nasal cannula throughout day, initially on 2L NC, was satting 100% so trialed on room air. Pt. Desatted into mid 80's, placed on 6L high flow and weaned back down to 3L NC. Pt. Denied any SOB. Pt. Remains on levophed. Family updated and questions answered today, family also spoke with palliative care, per pt.'s wishes pt. To remain full code at this time.   
Shift summary:  This morning patient was very lethargic. Pt. Woke to voice and followed commands, but fell back asleep within seconds. Pt. Was able to state her name and that [facility] was at [facility]\", but would not answer other questions and go back to sleep, or answered with garbled/incomprehensible speech. As the day went on, pt. Did wake up more, but overall remained lethargic and weak. Pt. Too lethargic for swallow screen so was unable to give oral medications in the morning. Pt. Received dialysis today and tolerated well. Medications held during dialysis and given after dialysis was finished. Since pt. Remained npo, corpak was placed for medications and tube feedings. After placement confirmed, was able to give medications. Pt. Does have history of N/V, but plan per Dr. Marsh is to start tube feedings and see how patient tolerates. Pt. Does receive scheduled Reglan. Pt. Had one small, soft BM, maroon in color. Pt. Given full bed bath and linen change. Pt. On levo throughout shift, see MAR for titrations.   Handoff report given at bedside.   
Spiritual Care Services     Summary of Visit:     attended multidisciplinary team rounding, patient was a rapid response on the floor and came back to ICU from yesterday evening, Patient requested to see the , pt requested prayer and God's help, prayed for the patient and offered spiritual support,         Encounter Summary  Encounter Overview/Reason : Interdisciplinary rounds  Service Provided For:: Patient  Referral/Consult From:: Multi-disciplinary team, Rounding  Support System: Children, Family members  Complexity of Encounter: Moderate  Begin Time: 1350  End Time : 1405  Total Time Calculated: 15 min        Spiritual/Emotional needs  Type: Spiritual Support     Grief, Loss, and Adjustments  Type: Life Adjustments                Spiritual Assessment/Intervention/Outcomes:    Assessment: Concerns with suffering, Compromised coping    Intervention: Sustaining Presence/Ministry of presence, Active listening, Explored/Affirmed feelings, thoughts, concerns, Discussed meaning/purpose, Prayer (assurance of)/[place]    Outcome: Receptive      Care Plan:    Plan and Referrals  Plan/Referrals: Continue to visit, (comment), Continue Support (comment)          Spiritual Care Services   Electronically signed by Chaplain Galen on 4/19/2024 at 2:20 PM.    To reach a  for emotional and spiritual support, place an EPIC consult request.   If a  is needed immediately, dial “0” and ask to page the on-call .   
Spiritual Care Services     Summary of Visit:   responded to a Rapid Response and provided pastoral support.    Encounter Summary  Encounter Overview/Reason : Crisis  Service Provided For:: Patient  Referral/Consult From:: Multi-disciplinary team  Support System: Children, Family members  Complexity of Encounter: High  Begin Time: 1230  End Time : 1245  Total Time Calculated: 15 min        Spiritual/Emotional needs  Type: Spiritual Support                      Spiritual Assessment/Intervention/Outcomes:    Assessment: Unable to assess    Intervention: Sustaining Presence/Ministry of presence    Outcome: Did not respond      Care Plan:    Plan and Referrals  Plan/Referrals: Continue Support (comment)          Spiritual Care Services   Electronically signed by Chaplain Pearl on 4/18/2024 at 1:14 PM.    To reach a  for emotional and spiritual support, place an EPIC consult request.   If a  is needed immediately, dial “0” and ask to page the on-call .   
Spiritual Care Services     Summary of Visit:   visited patient in ICU. Patient undergoing dialysis this morning and is receptive to spiritual support as needed.    Encounter Summary  Encounter Overview/Reason : Initial Encounter  Service Provided For:: Patient  Referral/Consult From:: Rounding  Support System: Children, Family members  Complexity of Encounter: Low  Begin Time: 1115  End Time : 1130  Total Time Calculated: 15 min        Spiritual/Emotional needs  Type: Spiritual Support                      Spiritual Assessment/Intervention/Outcomes:    Assessment: Calm, Peaceful    Intervention: Sustaining Presence/Ministry of presence    Outcome: Receptive      Care Plan:    Plan and Referrals  Plan/Referrals: Continue Support (comment)          Spiritual Care Services   Electronically signed by Chaplain Pearl on 4/17/2024 at 11:36 AM.    To reach a  for emotional and spiritual support, place an EPIC consult request.   If a  is needed immediately, dial “0” and ask to page the on-call .   
Spiritual Care Services     Summary of Visit:  Attended ICU interdisciplinary team rounding, pt was alert, pt desires to be moved to [facility], on going spiritual health care   Encounter Summary  Encounter Overview/Reason: Interdisciplinary rounds  Service Provided For: Patient  Referral/Consult From: Multi-disciplinary team, Rounding  Support System: Children, Family members  Last Encounter : 04/29/24  Complexity of Encounter: Low  Begin Time: 0935  End Time : 0940  Total Time Calculated: 5 min        Spiritual/Emotional needs  Type: Spiritual Support     Grief, Loss, and Adjustments  Type: Adjustment to illness                Spiritual Assessment/Intervention/Outcomes:    Assessment: Compromised coping    Intervention: Sustaining Presence/Ministry of presence    Outcome: Receptive      Care Plan:    Plan and Referrals  Plan/Referrals: Continue to visit, (comment)          Spiritual Care Services   Electronically signed by Chaplain Galen on 4/30/2024 at 4:06 PM.    To reach a  for emotional and spiritual support, place an EPIC consult request.   If a  is needed immediately, dial “0” and ask to page the on-call .   
Spiritual Care Services     Summary of Visit:  Pt resting comfortably this morning, no family present. Prayer provided for pt per family and pt request made yesterday.     Encounter Summary  Encounter Overview/Reason : Spiritual/Emotional Needs  Service Provided For:: Patient  Referral/Consult From:: Estephania  Support System: Children, Family members  Last Encounter : 04/20/24  Complexity of Encounter: Low  Begin Time: 0830  End Time : 0845  Total Time Calculated: 15 min        Spiritual/Emotional needs  Type: Spiritual Support     Grief, Loss, and Adjustments  Type: Life Adjustments                Spiritual Assessment/Intervention/Outcomes:    Assessment: Unable to assess    Intervention: Prayer (assurance of)/[place]    Outcome: Did not respond      Care Plan:    Plan and Referrals  Plan/Referrals: Continue Support (comment)    Spiritual Care Services   Electronically signed by LUKAS Rosenthal on 4/21/2024 at 12:01 PM.    To reach a  for emotional and spiritual support, place an EPIC consult request.   If a  is needed immediately, dial “0” and ask to page the on-call .   
Spiritual Care Services     Summary of Visit:  Request for prayer with patient and family, who were at bedside. Pt appeared to be compromised, uncomfortable, despite being very well tended. Able to respond to questions, a bit difficult to understand. Prayer and emotional support provided. Pt is Jainism, important to her. Pt has an AD naming her dtr Angelina.     Encounter Summary  Encounter Overview/Reason : Initial Encounter  Service Provided For:: Patient and family together  Referral/Consult From:: Nurse, Family  Support System: Children, Family members  Last Encounter : 04/19/24  Complexity of Encounter: Moderate  Begin Time: 1230  End Time : 1250  Total Time Calculated: 20 min        Spiritual/Emotional needs  Type: Spiritual Support     Grief, Loss, and Adjustments  Type: Life Adjustments                Spiritual Assessment/Intervention/Outcomes:    Assessment: Compromised coping    Intervention: Prayer (assurance of)/[place]    Outcome: Comfort      Care Plan:    Plan and Referrals  Plan/Referrals: Continue to visit, (comment)    Spiritual Care Services   Electronically signed by LUKAS Rosenthal on 4/20/2024 at 1:18 PM.    To reach a  for emotional and spiritual support, place an EPIC consult request.   If a  is needed immediately, dial “0” and ask to page the on-call .    
Spoke to Linda HUDDLESTON to follow up after yesterday's paracentesis. Band-hemanth C, D, I. No drainage present. No concerns voiced at this time.   
Treatment time: 210 minutes    Net UF: 1500 mL    Pre weight: 96.9 kg  Post weight: 95.4 kg  EDW: 97 kg    Access used: AVG LUE  Access function: good, no issues noted    Medications or blood products given: 25 g albumin, levo titrated by ICU, RN    Regular outpatient schedule: TTS [facility]    Summary of response to treatment: Patient tolerated treatment fairly, levo restarted during HD and titrated to 10 mcgs, Dr. Puentes updated during treatment and goal reduced to 1.5-2 L due to hypotension, bleeding time <10 minutes at access sites, report given to FLAQUITO Victor.    Copy of dialysis treatment record placed in chart, to be scanned into EMR.  
Treatment time: 210 minutes    Net UF: 3000 mL    Pre weight: 94.9 kg  Post weight: 91.9 kg  EDW: 97 kg    Access used: AVF LUE  Access function: good    Medications or blood products given: 25 g albumin    Regular outpatient schedule: TTS     Summary of response to treatment: Patient tolerated treatment well, albumin given for hypotension, levo titrated by ICU, RN during HD, bleeding time <10 minutes from access sites, report given to FLAQUITO Ramirez.    Copy of dialysis treatment record placed in chart, to be scanned into EMR.  
Treatment time: 210 minutes    Net UF: 3000 mL    Pre weight: 97.8 kg  Post weight: 94.8 kg  EDW: 97 kg    Access used: AVG LUE  Access function: good    Medications or blood products given: albumin 25 g    [facility]    Summary of response to treatment: Patient tolerated treatment well, albumin given for hypotension, orders reviewed with Dr. Finch prior to treatment, bleeding from access sites <10 minutes, report given to FLAQUITO Chavira.    Copy of dialysis treatment record placed in chart, to be scanned into EMR.  
[facility]  Occupational Therapy        NAME:  Michelle Le  ROOM: IC02/IC02-01  :  1960  DATE: 2024    Attempted to see Michelle Le at 1340 on this date for:   [x]  Initial Evaluation   []  Treatment       Patient was unable to be seen due to:   [] Off unit for testing/procedure    [] Patient refused, stating \"    [] Therapy on hold due to     [] Nursing deferred due to    [x] Other:  receiving dialysis at bedside.    Will re-attempt at earliest convenience    Electronically signed by Mechelle Martinez OT on 24 at 1:49 PM EDT  
[facility]   Pharmacy Pharmacokinetic Monitoring Service - Vancomycin    Michelle Le is a 64 y.o. female starting on vancomycin therapy for sepsis. Pharmacy consulted by Dr. Reynolds for monitoring and adjustment.    Target Concentration: Pre-Dialysis Concentration 15-20 mg/L  Additional Antimicrobials: rocephin     Pertinent Laboratory Values:   Wt Readings from Last 1 Encounters:   04/19/24 96.9 kg (213 lb 10 oz)     Temp Readings from Last 1 Encounters:   04/20/24 98.3 °F (36.8 °C) (Oral)     Recent Labs     04/19/24  0333 04/20/24  0600   CREATININE 5.59* 4.45*   BUN 57* 46*   WBC 19.1* 21.8*       Pertinent Cultures:  Culture Date Source Results   4/20 Blood Pending   MRSA Nasal Swab: N/A. Non-respiratory infection.    Plan:  Concentration-guided dosing due to renal impairment and intermittent hemodialysis   Start vancomycin 2250 mg x1 dose (~23.2 mg/kg)  HD schedule typically T/Th/Sat, currently off from her usual. Next session tentatively planned for 4/22, no orders placed at this time    Vancomycin concentration ordered for 4/22 @ 0600, prior to HD session   Pharmacy will continue to monitor patient and adjust therapy as indicated    Thank you for the consult,    Margaux Jones, PharmD  4/20/2024 9:10 AM  
[facility]   Pharmacy Dose Adjustment Per Protocol:  Meropenem Extended Interval Interchange    Michelle Le is a 64 y.o. female.     The following ordered dose of Meropenem has been changed to optimize its pharmacodynamic profile per [facility] pharmacy policy approved by [facility].    Recent Labs     04/21/24  0600 04/22/24  0600 04/22/24  0820   CREATININE 5.23* 5.92* 6.0*   BUN 61* 77*  --    WBC 15.5* 17.1*  --    Hemodialysis Intake (ml): 500 mlDialyzer Clearance: Lightly streaked.  Height: 170.2 cm (5' 7\"), Weight - Scale: 96.9 kg (213 lb 10 oz), Body mass index is 33.46 kg/m².  Estimated Creatinine Clearance: 11 mL/min (A) (based on SCr of 6 mg/dL (HH)).      Meropenem - Extended Infusion (3-hour infusion) - Preferred Dosing Strategy    Renal function (CrCl mL/min)  ? 50  26 - 49  10 - 25   < 10, HD, PD  CRRT    All indications - Loading dose of 0662-6292 milligrams x 1 over 30 minutes or via IV push (based on indication). Maintenance dose should begin at the next regularly scheduled dosing interval based on indication/renal function.    Maintenance dosing for all indications except as outlined below  1000mg q8h ¨ 1000mg q12h ¨ 500 mg q12h ¨ 500 mg q24h ¨ 1000mg q12h^ ¨   CNS infections, Cystic fibrosis, DIYA > 4  2000mg q8h ¨ 2000mg q12h ¨ 1000mg q12h ¨ 1000mg q24h ¨ 2000mg q12h† ¨    ^Consider 1000mg q8h for CRRT effluent rates > 3L/h   †Consider 2000mg q8h for CRRT effluent rates ? 3L/h     Will monitor for ID consult/extended LOT    Pharmacists should be contacted for issues concerning drug compatibility with multiple IV medications.  All doses will be prepared using 100ml bag to be infused over 3-hours at a rate of 33.3 ml/hr.    Thank You,  Leti Freeman [facility]  4/22/2024 11:45 AM    
[facility]   Pharmacy Pharmacokinetic Monitoring Service - Vancomycin    Consulting Provider: Dr. Reynolds   Indication: sepsis  Target Concentration: Pre-Dialysis Concentration 15-20 mg/L  Day of Therapy: 2  Additional Antimicrobials: ceftriaxone    Pertinent Laboratory Values:   Wt Readings from Last 1 Encounters:   04/19/24 96.9 kg (213 lb 10 oz)     Temp Readings from Last 1 Encounters:   04/21/24 97 °F (36.1 °C) (Oral)     Recent Labs     04/20/24  0600 04/21/24  0600   CREATININE 4.45* 5.23*   BUN 46* 61*   WBC 21.8* 15.5*       Pertinent Cultures:  Culture Date Source Results   4/20 Blood Pending   MRSA Nasal Swab: N/A. Non-respiratory infection.    Recent vancomycin administrations                     vancomycin (VANCOCIN) 2,250 mg in sodium chloride 0.9 % 500 mL IVPB (mg) 2,250 mg New Bag 04/20/24 1015                    Assessment/Plan:  Concentration-guided dosing due to renal impairment and intermittent hemodialysis   Patient received vancomycin 2250 mg x1 loading dose yesterday  Unclear what inpatient HD schedule is going to be  Vancomycin concentration tentatively ordered for 4/22 @ 0600, prior to HD session   Pharmacy will continue to monitor patient and adjust therapy as indicated    Thank you,    Margaux Jones, PharmD  4/21/2024 8:52 AM  
[facility]   Pharmacy Pharmacokinetic Monitoring Service - Vancomycin    Consulting Provider: Dr. Reynolds   Indication: Sepsis  Target Concentration: Pre-Dialysis Concentration of 15-20 mg/dL  Day of Therapy: 4  Additional Antimicrobials: Merrem    Pertinent Laboratory Values:   Wt Readings from Last 1 Encounters:   04/19/24 96.9 kg (213 lb 10 oz)     Temp Readings from Last 1 Encounters:   04/23/24 97 °F (36.1 °C) (Axillary)     Recent Labs     04/22/24  0600 04/22/24  0820 04/22/24  1140 04/23/24  0554 04/23/24  0559   CREATININE 5.92*   < > 5.9* 4.29*  --    BUN 77*  --   --  51*  --    WBC 17.1*  --   --   --  19.1*    < > = values in this interval not displayed.     Procalcitonin: 0.885    Pertinent Cultures:  Recent Labs     04/20/24  0824 03/29/24  1106   BC No Growth to date.  Any change in status will be called.  --    BLOODCULT2 No Growth to date.  Any change in status will be called.  --    COVID19  --  Not Detected     Recent vancomycin administrations                     vancomycin (VANCOCIN) 1,500 mg in sodium chloride 0.9 % 500 mL IVPB (ADDAVIAL) (mg) 1,500 mg New Bag 04/22/24 1925             Assessment:  Recent Labs     04/23/24  0554   VANCORANDOM 23.3       Plan:  Concentration-guided dosing due to renal impairment and intermittent hemodialysis   Current dosing regimen is supra-therapeutic  Will hold vancomycin dose today an d plan to re-dose after next HD session.  Vancomycin concentration ordered for 04/24 @ 0600, prior to HD session   Pharmacy will continue to monitor patient and adjust therapy as indicated    Thank you for the consult,    Mariela Manley, PharmD  PGY1 Pharmacy Resident  4/23/2024 10:38 AM      
  --  152    < > = values in this interval not displayed.     Recent Labs     04/21/24  0600 04/22/24  0600 04/22/24  0820 04/22/24  1140 04/23/24  0554    131*  --   --  133*   K 4.8 5.3*  --   --  4.1   CL 90* 85*  --   --  88*   CO2 19* 16*  --   --  18*   BUN 61* 77*  --   --  51*   CREATININE 5.23* 5.92* 6.0* 5.9* 4.29*     Recent Labs     04/21/24  0600 04/22/24  0600 04/23/24  0554   AST 15 15 14   ALT <5 <5 <5   BILITOT 1.2* 1.2* 1.1*   ALKPHOS 81 84 83     No results for input(s): \"LIPASE\", \"AMYLASE\" in the last 72 hours.  Recent Labs     04/21/24  0600 04/22/24  0600 04/23/24  0559   PROTIME 20.7* 19.7* 18.3*   INR 1.8 1.7 1.5       CT Result (most recent):  CT ABDOMEN PELVIS WO IV CONTRAST 04/16/2024    Narrative  EXAMINATION:  CT OF THE ABDOMEN AND PELVIS WITHOUT CONTRAST 4/16/2024 12:10 pm  FINDINGS:  Lower Chest: Cardiomegaly seen with small effusions and mild chronic  reticulation of lung markings    Organs: Slightly nodular contour of the liver.  Small gallstone.  Advanced  pancreatic atrophy.  The spleen is normal in size.  There is advanced  bilateral renal atrophy.    GI/Bowel: Small hiatal hernia.    Pelvis: No pelvic mass, adenopathy, or fluid collection.    Peritoneum/Retroperitoneum: There is no lymphadenopathy.  Portal venous and  venous structures are unremarkable.  Arterial calcifications are observed.  Moderate to large amount of perihepatic, perisplenic, and lower abdominal  homogeneous fluid attenuation ascites    Bones/Soft Tissues: Mild anasarca.  Muscular atrophy observed.  Degenerative  changes of the osseous structures.  Likely chronic fracture deformities in  the lower thoracic spine.    Impression  1. Moderate to large amount of perihepatic, perisplenic, and lower abdominal  ascites.  No evidence of retroperitoneal bleed  2. Slightly nodular contour of the liver.  3. Cardiomegaly with small effusions and mild chronic reticulation of lung  markings.    ASSESSMENT:  Patient is 
(HCC)     Compression fracture of spine (HCC)     Closed rib fracture     Depression     Chronic obstructive pulmonary disease (HCC)     Critical illness polyneuropathy (HCC)     Multiple closed fractures of ribs of right side     Nonrheumatic mitral valve regurgitation     Nonrheumatic tricuspid valve regurgitation     Need for extended care facility     Chronic pain of both shoulders     Hemodialysis-associated hypotension     Anginal chest pain at rest (HCC)     Chest pain     Unstable angina (HCC)     NSTEMI (non-ST elevated myocardial infarction) (HCC)     CKD (chronic kidney disease) stage 4, GFR 15-29 ml/min (HCC)     Hyperkalemia     Impaired mobility and activities of daily living dt polyneuropathy and reccent fall      Dialysis patient (HCC)     Unspecified open wound of right upper arm, initial encounter     Multiple closed fractures of right lower extremity and ribs     Closed T11 fracture (HCC)     Encephalopathy acute     MRSA bacteremia     Sepsis due to Enterococcus (HCC)     Local infection due to central venous catheter     DJD (degenerative joint disease), cervical     Closed head injury     Sarcopenia     Fall from standing     Sepsis due to skin infection (HCC)     Chronic hepatitis C (HCC)     Catheter-related bloodstream infection     Enterococcus faecalis infection     Cervical stenosis of spinal canal     Ataxic gait     Lumbar stenosis with neurogenic claudication     Falls infrequently     Infection of venous access port     Diarrhea     Anemia, unspecified     Eczema     AMS (altered mental status)     Heart failure (HCC)     Interstitial lung disease (HCC)     Cellulitis of left hand     Chronic osteomyelitis of knee (HCC)     Generalized weakness     Shock (HCC)     Fluid overload     Encounter for hospice care discussion     Advanced care planning/counseling discussion     Goals of care, counseling/discussion     Palliative care encounter     Nonhealing nonsurgical wound     Skin 
04/28/24  0600 04/29/24  0510 04/30/24  0519    134* 134*   K 4.0 4.8 5.1*   CL 93* 91* 91*   CO2 29 25 26   BUN 49* 68* 86*   CREATININE 3.67* 4.55* 4.95*   GLUCOSE 295* 382* 156*   PHOS 4.9* 4.6 3.9     PT/INR:  Recent Labs     04/28/24  0600 04/29/24  0511 04/30/24  0520   PROTIME 16.9* 16.4* 15.5*   INR 1.4 1.3 1.2     APTT:  Recent Labs     04/28/24  0600 04/29/24  0511   APTT 28.3 29.2     LIVER PROFILE:No results for input(s): \"AST\", \"ALT\", \"BILIDIR\", \"BILITOT\", \"ALKPHOS\" in the last 72 hours.    Imaging Last 24 Hours:  No results found.  Assessment//Plan           Hospital Problems             Last Modified POA    * (Principal) GI bleed 4/16/2024 Yes    Poorly controlled diabetes mellitus (HCC) 4/24/2024 Yes    Shock (HCC) 4/26/2024 Yes    Health education/counseling 4/17/2024 Yes    Full code status 4/17/2024 Yes    Open wound of left hand without foreign body 4/18/2024 Yes    Coffee ground emesis 4/21/2024 Yes    Nausea and vomiting 4/21/2024 Yes    Other ascites 4/24/2024 Yes    Acute respiratory failure (HCC) 4/24/2024 Yes    Cirrhosis of liver with ascites (HCC) 4/24/2024 Yes    Coagulopathy (HCC) 4/24/2024 Yes    Thrombocytopenia (HCC) 4/29/2024 Yes     Assessment:    Condition: In stable condition.  Unchanged.   (Uncontrolled diabetes with fluctuating glucose levels variable p.o. intake  Adrenal insufficiency changed to p.o. hydrocortisone  End-stage renal disease on hemodialysis  Generalized weakness deconditioning  GI bleed).     Plan:    (Continue hydrocortisone 20 mg twice daily  Change Lantus to 12 units twice a day Humalog 3 units with each meals plus low-dose Humalog coverage  Avoid hypoglycemia  Total time spent 35 minutes).       Electronically signed by Andrei Nino MD on 4/30/24 at 9:53 PM EDT    
disease (HCC)     Critical illness polyneuropathy (HCC)     Multiple closed fractures of ribs of right side     Nonrheumatic mitral valve regurgitation     Nonrheumatic tricuspid valve regurgitation     Need for extended care facility     Chronic pain of both shoulders     Hemodialysis-associated hypotension     Anginal chest pain at rest (HCC)     Chest pain     Unstable angina (HCC)     NSTEMI (non-ST elevated myocardial infarction) (HCC)     CKD (chronic kidney disease) stage 4, GFR 15-29 ml/min (HCC)     Hyperkalemia     Impaired mobility and activities of daily living dt polyneuropathy and reccent fall      Dialysis patient ([facility])     Unspecified open wound of right upper arm, initial encounter     Multiple closed fractures of right lower extremity and ribs     Closed T11 fracture (HCC)     Encephalopathy acute     MRSA bacteremia     Sepsis due to Enterococcus (HCC)     Local infection due to central venous catheter     DJD (degenerative joint disease), cervical     Closed head injury     Sarcopenia     Fall from standing     Sepsis due to skin infection (HCC)     Chronic hepatitis C (HCC)     Catheter-related bloodstream infection     Enterococcus faecalis infection     Cervical stenosis of spinal canal     Ataxic gait     Lumbar stenosis with neurogenic claudication     Falls infrequently     Infection of venous access port     Diarrhea     Anemia, unspecified     Eczema     AMS (altered mental status)     Heart failure (HCC)     Interstitial lung disease (HCC)     Cellulitis of left hand     Chronic osteomyelitis of knee (HCC)     Generalized weakness     Shock (HCC)     Fluid overload     Encounter for hospice care discussion     Advanced care planning/counseling discussion     Goals of care, counseling/discussion     Palliative care encounter     Nonhealing nonsurgical wound     Skin picking habit     Hypotension     ESRD on dialysis (HCC)     Acute decompensated heart failure (HCC)     Constipation     
intact, conjunctivae normal  Neck: neck supple and non tender without mass   Pulmonary/Chest: clear to auscultation bilaterally- no wheezes, rales or rhonchi, normal air movement, no respiratory distress  Cardiovascular: normal rate, normal S1 and S2 and no carotid bruits  Abdomen: soft, diffusely tender, distended  Extremities: no cyanosis, no clubbing, trace edema bilaterally   Neurologic: no cranial nerve deficit and speech normal        Recent Labs     04/17/24  1218 04/18/24  0546 04/19/24  0333    137 135   K 4.0 4.7 4.8   CL 90* 90* 90*   CO2 27 23 23   BUN 24* 37* 57*   CREATININE 3.64* 4.59* 5.59*   GLUCOSE 93 199* 199*   CALCIUM 9.9 10.2* 9.8       Recent Labs     04/17/24  0310 04/17/24  0900 04/18/24  0550 04/18/24  1030 04/19/24  0300 04/19/24  0333   WBC 14.0*  --  15.2*  --   --  19.1*   RBC 2.56*  --  2.55*  --   --  2.68*   HGB 8.7*   < > 8.7* 8.6* 9.2* 9.1*   HCT 26.2*   < > 26.3* 26.1* 27.8* 27.3*   .3*  --  103.1*  --   --  101.9*   MCH 34.0*  --  34.1*  --   --  34.0*   MCHC 33.2  --  33.1  --   --  33.3   RDW 17.8*  --  19.0*  --   --  19.8*     --  198  --   --  247    < > = values in this interval not displayed.       Radiology:   CT ABDOMEN PELVIS WO CONTRAST Additional Contrast? None   Final Result   1. Moderate to large amount of perihepatic, perisplenic, and lower abdominal   ascites.  No evidence of retroperitoneal bleed   2. Slightly nodular contour of the liver.   3. Cardiomegaly with small effusions and mild chronic reticulation of lung   markings.             Assessment/Plan:          Acute blood loss anemia 2/2 GI bleed in the setting of severely elevated INR (coagulopathy) and possible cardiac cirrhosis  - reports multiple days of bloody stools, episode of coffee ground emesis and large melonic stool while in the ED  - hgb 8.7 on arrival, was 10.8 on 4/12, as high as 13.1 within the last 2 weeks  - INR >10 on arrival, pt is on eliquis and aspirin but discussed 
pulmonary disease (HCC)     Critical illness polyneuropathy (HCC)     Multiple closed fractures of ribs of right side     Nonrheumatic mitral valve regurgitation     Nonrheumatic tricuspid valve regurgitation     Need for extended care facility     Chronic pain of both shoulders     Hemodialysis-associated hypotension     Anginal chest pain at rest (HCC)     Chest pain     Unstable angina (HCC)     NSTEMI (non-ST elevated myocardial infarction) (HCC)     CKD (chronic kidney disease) stage 4, GFR 15-29 ml/min (HCC)     Hyperkalemia     Impaired mobility and activities of daily living dt polyneuropathy and reccent fall      Dialysis patient (HCC)     Unspecified open wound of right upper arm, initial encounter     Multiple closed fractures of right lower extremity and ribs     Closed T11 fracture (HCC)     Encephalopathy acute     MRSA bacteremia     Sepsis due to Enterococcus (HCC)     Local infection due to central venous catheter     DJD (degenerative joint disease), cervical     Closed head injury     Sarcopenia     Fall from standing     Sepsis due to skin infection (HCC)     Chronic hepatitis C (HCC)     Catheter-related bloodstream infection     Enterococcus faecalis infection     Cervical stenosis of spinal canal     Ataxic gait     Lumbar stenosis with neurogenic claudication     Falls infrequently     Infection of venous access port     Diarrhea     Anemia, unspecified     Eczema     AMS (altered mental status)     Heart failure (HCC)     Interstitial lung disease (HCC)     Cellulitis of left hand     Chronic osteomyelitis of knee (HCC)     Generalized weakness     Shock (HCC)     Fluid overload     Encounter for hospice care discussion     Advanced care planning/counseling discussion     Goals of care, counseling/discussion     Palliative care encounter     Nonhealing nonsurgical wound     Skin picking habit     Hypotension     ESRD on dialysis (HCC)     Acute decompensated heart failure (HCC)     
swallow    Treatment/Activity Tolerance:  Patient limited by fatigue    Plan:  Continue per POC    Pain Assessment:  Patient does not c/o pain.    Pain Re-assessment:  Patient does not c/o pain.    Patient/Caregiver Education:  Patient educated on session and progression towards goals.  Education needs reinforcement.    Safety Devices:  All fall risk precautions in place      Therapy Time  SLP Individual Minutes  Time In: 1100  Time Out: 1113  Minutes: 13            Signature: Electronically signed by ANN MARIE Burleson on 4/29/2024 at 11:24 AM           
5280cc, of ascites fluid were aspirated and sent for cytology, and pathology.  The needle was removed, and hemostasis was obtained with pressure.  A Band-Aid was placed.     Post procedure images did not demonstrate hemorrhage at the target site. The patient tolerated the procedure well.   The patient left the department in good condition. A radiology nurse was in presence monitoring vital signs, assisting throughout the procedure.               XR CHEST PORTABLE   Final Result   No acute process.  Status post intubation, there is an NG tube in good   position.         XR CHEST PORTABLE   Final Result   1. Bronchial wall thickening and interstitial coarsening.  Some of this is   probably chronic or related to subsegmental atelectasis, though there could   be a superimposed component of edema, bronchitis or atypical infection.   2.  Trace bilateral pleural effusions/thickening.  Bibasilar subsegmental   atelectasis or scarring.   3. Enlarged cardiac silhouette.   4. Low lung volumes.         CT ABDOMEN PELVIS WO CONTRAST Additional Contrast? None   Final Result   1. Moderate to large amount of perihepatic, perisplenic, and lower abdominal   ascites.  No evidence of retroperitoneal bleed   2. Slightly nodular contour of the liver.   3. Cardiomegaly with small effusions and mild chronic reticulation of lung   markings.             Assessment/Plan:          Acute blood loss anemia 2/2 GI bleed in the setting of severely elevated INR (coagulopathy) and possible cardiac cirrhosis  - reports multiple days of bloody stools, episode of coffee ground emesis and large melonic stool while in the ED  - hgb 8.7 on arrival, was 10.8 on 4/12, as high as 13.1 within the last 2 weeks  - INR >10 on arrival, pt is on eliquis and aspirin but discussed with ED pharmacist and pt has no medications that should cause that significant of a coagulopathy, received vit K and Kcentra in the ED  Plan:  - admit to the ICU for close hemodynamic 
Trace perihepatic fluid.         IR US GUIDED PARACENTESIS   Final Result   1.Status post technically successful ultrasound-guided paracentesis.        CLINICAL HISTORY:VELASQUEZ GARBER is a Female of 64 years age, referred for Ultrasound Guided Paracentesis.     PROCEDURE: Survey of the abdomen showed large amount of ascites fluid.   After obtaining informed consent, the patient was positioned supine on the sonography table. Using ultrasound, the skin over the left hemiabdomen was locally anesthetized with 1% lidocaine.  Following that, a Yueh needle was advanced into the fluid    pocket using ultrasound visualization.  5280cc, of ascites fluid were aspirated and sent for cytology, and pathology.  The needle was removed, and hemostasis was obtained with pressure.  A Band-Aid was placed.     Post procedure images did not demonstrate hemorrhage at the target site. The patient tolerated the procedure well.   The patient left the department in good condition. A radiology nurse was in presence monitoring vital signs, assisting throughout the procedure.               XR CHEST PORTABLE   Final Result   No acute process.  Status post intubation, there is an NG tube in good   position.         XR CHEST PORTABLE   Final Result   1. Bronchial wall thickening and interstitial coarsening.  Some of this is   probably chronic or related to subsegmental atelectasis, though there could   be a superimposed component of edema, bronchitis or atypical infection.   2.  Trace bilateral pleural effusions/thickening.  Bibasilar subsegmental   atelectasis or scarring.   3. Enlarged cardiac silhouette.   4. Low lung volumes.         CT ABDOMEN PELVIS WO CONTRAST Additional Contrast? None   Final Result   1. Moderate to large amount of perihepatic, perisplenic, and lower abdominal   ascites.  No evidence of retroperitoneal bleed   2. Slightly nodular contour of the liver.   3. Cardiomegaly with small effusions and mild chronic reticulation 
left hemiabdomen was locally anesthetized with 1% lidocaine.  Following that, a Yueh needle was advanced into the fluid    pocket using ultrasound visualization.  5280cc, of ascites fluid were aspirated and sent for cytology, and pathology.  The needle was removed, and hemostasis was obtained with pressure.  A Band-Aid was placed.     Post procedure images did not demonstrate hemorrhage at the target site. The patient tolerated the procedure well.   The patient left the department in good condition. A radiology nurse was in presence monitoring vital signs, assisting throughout the procedure.               XR CHEST PORTABLE   Final Result   No acute process.  Status post intubation, there is an NG tube in good   position.         XR CHEST PORTABLE   Final Result   1. Bronchial wall thickening and interstitial coarsening.  Some of this is   probably chronic or related to subsegmental atelectasis, though there could   be a superimposed component of edema, bronchitis or atypical infection.   2.  Trace bilateral pleural effusions/thickening.  Bibasilar subsegmental   atelectasis or scarring.   3. Enlarged cardiac silhouette.   4. Low lung volumes.         CT ABDOMEN PELVIS WO CONTRAST Additional Contrast? None   Final Result   1. Moderate to large amount of perihepatic, perisplenic, and lower abdominal   ascites.  No evidence of retroperitoneal bleed   2. Slightly nodular contour of the liver.   3. Cardiomegaly with small effusions and mild chronic reticulation of lung   markings.           Assessment/Plan:     Acute blood loss anemia 2/2 GI bleed in the setting of severely elevated INR (coagulopathy) and possible cardiac cirrhosis-- reports multiple days of bloody stools, episode of coffee ground emesis and large melonic stool while in the ED. received Kcentra in the ED.  INR was above 10.  Patient is on Eliquis and aspirin at home.  -Seen by GI, EGD negative. Continue IV PPI BID.  Octreotide stopped.  -Hematologist is 
underlying heart disease, prognosis in patients with congestive hepatopathy is predicted mostly by the severity of the underlying heart disease. Liver disease rarely contributes significantly to morbidity or mortality.  2 - Moderate Ascites by imaging  ESRD on HD 3 x wk  Diagnostic/therapeutic paracentesis when amenable  3- Rectal bleeding, hematemesis:    Further hematemesis, reported 1 melanotic stool overnight, hgb 8.6, baseline is 11.6 in the setting of Eliquis.  Noted INR on arrival was greater than 10, she has been given Vitamin K and Kcentra INR is 5.3  -PPI infusion, octreotide infusion, ceftriaxone 1 gm IV daily   -Endoscopic timing dependent on clinical course and optimization, INR needs to be less than 2.0  -keep NPO   4 - HCC screening:   Recent imaging Negative for liver mass  5-  No overt Hepatic encephalopathy    Thank you for allowing me to participate in the care of your patient.  Please feel free to contact me with any concerns.    Ronna Aquino, APRN - CNP     
walking 07/27/2020    Coronary artery disease involving native coronary artery of native heart with angina pectoris ([facility]) 07/03/2020    Essential (primary) hypertension 07/03/2020    Pain, unspecified 07/03/2020    ESRD (end stage renal disease) on dialysis ([facility]) 07/03/2020    Other seizures ([facility]) 07/03/2020    Paranoid schizophrenia ([facility]) 07/03/2020    Renal failure 06/28/2020    Recurrent falls 06/16/2020    Edema 12/23/2019    Closed supracondylar fracture of right humerus 12/23/2019    Other chronic pain 12/23/2019    Palliative care patient 12/23/2019    Class 2 severe obesity with serious comorbidity and body mass index (BMI) of 36.0 to 36.9 in adult ([facility]) 12/03/2019    [place] (obstructive sleep apnea) 11/05/2019    Pulmonary hypertension, unspecified ([facility]) 11/05/2019    Chronic diastolic congestive heart failure ([facility]) 10/04/2019    Poorly controlled diabetes mellitus ([facility])     Shortness of breath 10/02/2019    Tardive dyskinesia 05/16/2019    Angina, class II ([facility])     Controlled type 2 diabetes mellitus with diabetic neuropathy, with long-term current use of insulin ([facility]) 02/24/2017    Diabetes mellitus ([facility]) 08/04/2016    Stented coronary artery-plan is to stay on Plavix indefinately per Dr Walker 06/02/2016    History of seizures     Urinary incontinence due to cognitive impairment     History of type C viral hepatitis     Diabetic nephropathy with proteinuria ([facility])     Incontinence of urine 04/07/2014    Vitamin D insufficiency 02/04/2014    Vitamin B 12 deficiency 06/05/2013    Cerebral microvascular disease 06/05/2013    Hemiparesis, left ([facility]) 01/01/2013    Schizophrenia, paranoid, chronic     Metabolic syndrome     Mixed hyperlipidemia 12/09/2010    Other hammer toe (acquired) 12/13/2007        Past Medical History:   Diagnosis Date    Angina at rest ([facility]) 5/24/2020    Anxiety     CAD S/P percutaneous coronary angioplasty 2015, 2018    stents per Huong Sanabria    CHF (congestive heart failure) 
  WBC 22.2* 21.6*   HGB 10.2* 9.8*   PLT 75* 65*     CMP:    Recent Labs     04/27/24  0515 04/28/24  0600 04/29/24  0510   * 137 134*   K 4.5 4.0 4.8   CL 91* 93* 91*   CO2 23 29 25   BUN 67* 49* 68*   CREATININE 4.93* 3.67* 4.55*   GLUCOSE 384* 295* 382*   CALCIUM 9.0 9.9 9.8   LABGLOM 9.1* 13.0* 10.1*     Troponin: No results for input(s): \"TROPONINI\" in the last 72 hours.  BNP: No results for input(s): \"BNP\" in the last 72 hours.  INR:   Recent Labs     04/29/24  0511   INR 1.3     Lipids: No results for input(s): \"CHOL\", \"LDLDIRECT\", \"TRIG\", \"HDL\", \"AMYLASE\", \"LIPASE\" in the last 72 hours.  Liver: No results for input(s): \"AST\", \"ALT\", \"ALKPHOS\", \"PROT\", \"LABALBU\", \"BILITOT\" in the last 72 hours.    Invalid input(s): \"BILDIR\"  Iron:  No results for input(s): \"IRONS\", \"FERRITIN\" in the last 72 hours.    Invalid input(s): \"LABIRONS\"  Urinalysis: No results for input(s): \"UA\" in the last 72 hours.    Objective:  Vitals: BP (!) 131/57   Pulse 92   Temp 98.9 °F (37.2 °C) (Oral)   Resp 20   Ht 1.702 m (5' 7\")   Wt 94.4 kg (208 lb 1.8 oz)   LMP  (LMP Unknown)   SpO2 96%   BMI 32.60 kg/m²    Wt Readings from Last 3 Encounters:   04/29/24 94.4 kg (208 lb 1.8 oz)   04/09/24 96.6 kg (212 lb 15.4 oz)   03/09/24 99.2 kg (218 lb 11.1 oz)      24HR INTAKE/OUTPUT:    Intake/Output Summary (Last 24 hours) at 4/29/2024 0903  Last data filed at 4/29/2024 0803  Gross per 24 hour   Intake 2007.86 ml   Output 0 ml   Net 2007.86 ml       General: alert, in no apparent distress  HEENT: normocephalic, atraumatic, anicteric feeding tube  Neck: supple, no mass  Lungs: non-labored respirations, clear to auscultation bilaterally  Heart: regular rate and rhythm, no murmurs or rubs  Abdomen: soft, non-tender, non-distended  Ext: no cyanosis, no peripheral edema  Neuro: alert and oriented, no gross abnormalities  Psych: normal mood and affect  Skin: no rash      Electronically signed by Jaden Finch DO              
04/22/24  1140 04/23/24  0554 04/24/24  0512   *  --   --  133* 134*   K 5.3*  --   --  4.1 4.4   CL 85*  --   --  88* 91*   CO2 16*  --   --  18* 19*   BUN 77*  --   --  51* 65*   CREATININE 5.92*   < > 5.9* 4.29* 5.08*   GLUCOSE 302*  --   --  266* 270*   CALCIUM 9.8  --   --  9.5 9.8   LABGLOM 7.3*   < > 7* 10.8* 8.8*    < > = values in this interval not displayed.     Troponin: No results for input(s): \"TROPONINI\" in the last 72 hours.  BNP: No results for input(s): \"BNP\" in the last 72 hours.  INR:   Recent Labs     04/24/24  0513   INR 1.4     Lipids: No results for input(s): \"CHOL\", \"LDLDIRECT\", \"TRIG\", \"HDL\", \"AMYLASE\", \"LIPASE\" in the last 72 hours.  Liver:   Recent Labs     04/24/24  0512   AST 13   ALT <5   ALKPHOS 85   PROT 6.7   BILITOT 1.2*     Iron:  No results for input(s): \"IRONS\", \"FERRITIN\" in the last 72 hours.    Invalid input(s): \"LABIRONS\"  Urinalysis: No results for input(s): \"UA\" in the last 72 hours.    Objective:  Vitals: BP (!) 123/50   Pulse 91   Temp 97.6 °F (36.4 °C) (Axillary)   Resp 21   Ht 1.702 m (5' 7\")   Wt 97.8 kg (215 lb 9.8 oz)   LMP  (LMP Unknown)   SpO2 99%   BMI 33.77 kg/m²    Wt Readings from Last 3 Encounters:   04/24/24 97.8 kg (215 lb 9.8 oz)   04/09/24 96.6 kg (212 lb 15.4 oz)   03/09/24 99.2 kg (218 lb 11.1 oz)      24HR INTAKE/OUTPUT:    Intake/Output Summary (Last 24 hours) at 4/24/2024 0801  Last data filed at 4/24/2024 0800  Gross per 24 hour   Intake 504.42 ml   Output --   Net 504.42 ml       General: alert, in no apparent distress  HEENT: normocephalic, atraumatic, anicteric  Neck: supple, no mass  Lungs: non-labored respirations, clear to auscultation bilaterally  Heart: regular rate and rhythm, no murmurs or rubs  Abdomen: soft, non-tender, non-distended  Ext: no cyanosis, no peripheral edema  Neuro: alert and oriented, no gross abnormalities  Psych: normal mood and affect  Skin: no rash      Electronically signed by Jaden Finch, 
ascites    Acute respiratory failure (HCC)    Cirrhosis of liver with ascites (HCC)    Coagulopathy (HCC)             Imaging and labs were reviewed per medical records and any ID pertinent labs were also addressed        Tiny Suresh DO     
therapy  Pulmonary hygiene   TV on, natural light, avoid benzos, pain control, early mobility, and family engagement  PUD prophylaxis  DVT prophylaxis  Speech therapy   NG tube and resume tube feed  Target blood sugar 140-180  Levophed to maintain MAP ~65-70  Gallbladder ultrasound   Monitor and replace electrolyte as needed  Continue Reglan  Continue Solu-Cortef  Continue Provigil  Watch closely, reintubate if unable to protect her airways or worsening hypercapnia      Due to the immediate potential for life-threatening deterioration due to acute encephalopathy and shock I spent 35  minutes providing critical care.  This time is excluding time spent performing procedures.          Electronically signed by Say Marsh MD,  FCCP ,on 4/26/2024 at 7:21 AM    
to reduce the radiation dose to as low as reasonably achievable. COMPARISON: None. HISTORY: ORDERING SYSTEM PROVIDED HISTORY: evaluate for retroperitoneal bleed TECHNOLOGIST PROVIDED HISTORY: Reason for exam:->evaluate for retroperitoneal bleed Additional Contrast?->None What reading provider will be dictating this exam?->CRC FINDINGS: Lower Chest: Cardiomegaly seen with small effusions and mild chronic reticulation of lung markings Organs: Slightly nodular contour of the liver.  Small gallstone.  Advanced pancreatic atrophy.  The spleen is normal in size.  There is advanced bilateral renal atrophy. GI/Bowel: Small hiatal hernia. Pelvis: No pelvic mass, adenopathy, or fluid collection. Peritoneum/Retroperitoneum: There is no lymphadenopathy.  Portal venous and venous structures are unremarkable.  Arterial calcifications are observed. Moderate to large amount of perihepatic, perisplenic, and lower abdominal homogeneous fluid attenuation ascites Bones/Soft Tissues: Mild anasarca.  Muscular atrophy observed.  Degenerative changes of the osseous structures.  Likely chronic fracture deformities in the lower thoracic spine.     1. Moderate to large amount of perihepatic, perisplenic, and lower abdominal ascites.  No evidence of retroperitoneal bleed 2. Slightly nodular contour of the liver. 3. Cardiomegaly with small effusions and mild chronic reticulation of lung markings.     Echo (TTE) complete (PRN contrast/bubble/strain/3D)    Result Date: 4/9/2024    Left Ventricle: Normal left ventricular systolic function with a visually estimated EF of 60 - 65%. Left ventricle size is normal. Moderate septal thickening. Normal wall motion. Diastolic dysfunction present with normal LV EF.   Right Ventricle: Right ventricle is moderately dilated.   Mitral Valve: Mildly calcified leaflet, at the anterior leaflet. Moderate annular calcification at the anterior leaflet of the mitral valve. Mild stenosis noted.   Tricuspid Valve: Mild 
unspecified     Paranoid schizophrenia (HCC)     Compression fracture of spine (HCC)     Closed rib fracture     Depression     Chronic obstructive pulmonary disease (HCC)     Critical illness polyneuropathy (HCC)     Multiple closed fractures of ribs of right side     Nonrheumatic mitral valve regurgitation     Nonrheumatic tricuspid valve regurgitation     Need for extended care facility     Chronic pain of both shoulders     Hemodialysis-associated hypotension     Anginal chest pain at rest (HCC)     Chest pain     Unstable angina (HCC)     NSTEMI (non-ST elevated myocardial infarction) ([facility])     CKD (chronic kidney disease) stage 4, GFR 15-29 ml/min ([facility])     Hyperkalemia     Impaired mobility and activities of daily living dt polyneuropathy and reccent fall      Dialysis patient ([facility])     Unspecified open wound of right upper arm, initial encounter     Multiple closed fractures of right lower extremity and ribs     Closed T11 fracture ([facility])     Encephalopathy acute     MRSA bacteremia     Sepsis due to Enterococcus (HCC)     Local infection due to central venous catheter     DJD (degenerative joint disease), cervical     Closed head injury     Sarcopenia     Fall from standing     Sepsis due to skin infection (HCC)     Chronic hepatitis C (HCC)     Catheter-related bloodstream infection     Enterococcus faecalis infection     Cervical stenosis of spinal canal     Ataxic gait     Lumbar stenosis with neurogenic claudication     Falls infrequently     Infection of venous access port     Diarrhea     Anemia, unspecified     Eczema     AMS (altered mental status)     Heart failure (HCC)     Interstitial lung disease (HCC)     Cellulitis of left hand     Chronic osteomyelitis of knee (HCC)     Generalized weakness     Shock (HCC)     Fluid overload     Encounter for hospice care discussion     Advanced care planning/counseling discussion     Goals of care, counseling/discussion     Palliative care encounter     
vent management    Shock - multifactorial as patient has had recent bleeding, BP also runs low at baseline. Adrenal insufficiency also may be playing role  -Transferred back to ICU 4/18 for pressor requirement  -Management as above for hemorrhage  -Management as below for adrenal insufficiency  -Continue rocephin, vancomycin added given persistent leukocytosis and elevated procal.      Hyponatremia  - may be related to mild adrenal crisis or ESRD  - stress dose steroids as below  - hemodialysis per nephrology  - will continue to monitor, repeat BMP in AM     Afib on eliquis  - holding eliquis in the setting of GI bleed     T2DM  - FSBG per protocol with high dose SSI, will adjust as needed     Adrenal insufficiency  - Na 132, K 5.1, borderline hypotensive on admission  - continue stress dose steroids     VTE Prophylaxis: holding in the setting of GI bleed     Diet: NPO    Electronically signed by Khalif Mir DO on 4/23/2024 at 11:51 AM    
level, 1/2 bath on main level, Bed/Bath upstairs (12 steps to second floor for shower, pt primarily stays on 1st floor, SBA for stairs)  Home Access: Ramped entrance  Bathroom Shower/Tub: Tub/Shower unit  Bathroom Equipment: Toilet raiser  Home Equipment: Walker, rolling, Rollator, Wheelchair-manual  ADL Assistance:  (Daughter assists)  Homemaking Assistance:  (Daughter)  Ambulation Assistance: Independent (Rollator)  Active : No  Additional Comments: Pt has been at [place]    OBJECTIVE:   Vision  Vision: Impaired  Vision Exceptions: Wears glasses at all times  Hearing: Exceptions to [facility]    Cognition:  Overall Orientation Status: Within Functional Limits  Orientation Level: Oriented to person, Oriented to situation  Follows Commands: Within Functional Limits  Overall Cognitive Status: Exceptions  Cognition Comment: Increased time for problem solving and sequencing         ROM:  AROM: Generally decreased, functional    Strength:  Strength: Grossly decreased, non-functional    Neuro:  Balance  Sitting - Static: Poor (Pt unable to maintain seated position EOB without assistance despite VCs for hand placement and activation of core musculature)  Sitting - Dynamic: Poor                      Tone: Normal  Coordination: Generally decreased, functional         Bed mobility  Rolling to Left: Moderate assistance  Rolling to Right: Moderate assistance  Supine to Sit: 2 Person assistance;Maximum assistance  Sit to Supine: 2 Person assistance;Maximum assistance  Scooting: Dependent/Total;2 Person assistance  Bed Mobility Comments: VCs for hand placement and sequencing throughout. Pt would attempt but was unable to generate force to assist    Transfers  Comment: Not safe to perform at this time dt gross strength deficits and height of bed                        Activity Tolerance  Activity Tolerance: Patient tolerated evaluation without incident    Patient Education  Education Given To: Patient  Education Provided: Role of 
moderately dilated.   Right Atrium: Right atrium is mildly dilated.   Image quality is technically difficult. Contrast used: Definity. Technically difficult study and procedure performed with the patient in a supine position.     CT HEAD WO CONTRAST    Result Date: 4/9/2024  EXAMINATION: CT OF THE HEAD WITHOUT CONTRAST  4/9/2024 10:14 am TECHNIQUE: CT of the head was performed without the administration of intravenous contrast. Automated exposure control, iterative reconstruction, and/or weight based adjustment of the mA/kV was utilized to reduce the radiation dose to as low as reasonably achievable. COMPARISON: None. HISTORY: ORDERING SYSTEM PROVIDED HISTORY: new seizure TECHNOLOGIST PROVIDED HISTORY: Reason for exam:->new seizure Has a \"code stroke\" or \"stroke alert\" been called?->No What reading provider will be dictating this exam?->CRC FINDINGS: BRAIN/VENTRICLES: Mild generalized atrophy of the brain.  Some low-attenuation areas seen in the periventricular and subcortical white matter to suggest chronic small vessel ischemic change.  Dystrophic calcifications seen within the midline.  There is no acute intracranial hemorrhage, mass effect or midline shift.  No abnormal extra-axial fluid collection.  The gray-white differentiation is maintained without evidence of an acute infarct.  There is no evidence of hydrocephalus. ORBITS: The visualized portion of the orbits demonstrate no acute abnormality. SINUSES: The visualized paranasal sinuses and mastoid air cells demonstrate no acute abnormality.  Mild mucosal thickening involving the maxillary sinuses and ethmoid air cells to suggest a chronic sinusitis. SOFT TISSUES/SKULL:  No acute abnormality of the visualized skull or soft tissues.     Atrophy and chronic changes seen within the brain with no acute intracranial abnormality.     CTA CHEST W WO CONTRAST    Result Date: 4/9/2024  EXAMINATION: CTA OF THE CHEST WITH AND WITHOUT CONTRAST 4/9/2024 10:14 am 
pain at rest (HCC)    Chest pain    Unstable angina (HCC)    NSTEMI (non-ST elevated myocardial infarction) (HCC)    CKD (chronic kidney disease) stage 4, GFR 15-29 ml/min (HCC)    Hyperkalemia    Impaired mobility and activities of daily living dt polyneuropathy and reccent fall     Dialysis patient (HCC)    Unspecified open wound of right upper arm, initial encounter    Multiple closed fractures of right lower extremity and ribs    Closed T11 fracture (HCC)    Encephalopathy acute    MRSA bacteremia    Sepsis due to Enterococcus (HCC)    Local infection due to central venous catheter    DJD (degenerative joint disease), cervical    Closed head injury    Sarcopenia    Fall from standing    Sepsis due to skin infection (HCC)    Chronic hepatitis C (HCC)    Catheter-related bloodstream infection    Enterococcus faecalis infection    Cervical stenosis of spinal canal    Ataxic gait    Lumbar stenosis with neurogenic claudication    Falls infrequently    Infection of venous access port    Diarrhea    Anemia, unspecified    Eczema    AMS (altered mental status)    Heart failure (HCC)    Interstitial lung disease (HCC)    Cellulitis of left hand    Chronic osteomyelitis of knee (HCC)    Generalized weakness    Shock (HCC)    Fluid overload    Encounter for hospice care discussion    Advanced care planning/counseling discussion    Goals of care, counseling/discussion    Palliative care encounter    Nonhealing nonsurgical wound    Skin picking habit    Hypotension    ESRD on dialysis (HCC)    Acute decompensated heart failure (HCC)    Constipation    Chronic antibiotic suppression    Chronic infection of prosthetic knee (HCC)    Lack of intravenous access    Pressure injury, unstageable, with suspected deep tissue injury (HCC)    Hypogammaglobulinemia (HCC)    Adrenal insufficiency (HCC)    GI bleed    Health education/counseling    Full code status    Open wound of left hand without foreign body    Coffee ground emesis    
procedure. Radiologist was not present during examination. FLUOROSCOPY DOSE AND TYPE: Radiation Exposure Index: Kerma: 33.64 MGy, Images: 1 cine fluoro image Fluoro time: 168.5 seconds COMPARISON: None HISTORY: ORDERING SYSTEM PROVIDED HISTORY: pain TECHNOLOGIST PROVIDED HISTORY: Reason for exam:->pain What reading provider will be dictating this exam?->CRC Intraprocedural imaging. FINDINGS: Spot intraoperative images are obtained demonstrating placement of a right central venous catheter.     Intraprocedural fluoroscopic spot images as above.  See separate procedure report for more information.     XR ABDOMEN (KUB) (SINGLE AP VIEW)    Result Date: 4/3/2024  EXAMINATION: ONE SUPINE XRAY VIEW(S) OF THE ABDOMEN 4/3/2024 7:07 pm COMPARISON: None. HISTORY: ORDERING SYSTEM PROVIDED HISTORY: nausea and vomiting TECHNOLOGIST PROVIDED HISTORY: Reason for exam:->nausea and vomiting What reading provider will be dictating this exam?->CRC FINDINGS: Nonspecific bowel gas pattern without evidence of obstruction. No abnormal calcifications. No acute osseous abnormality.  Colonic fecal retention.     No evidence of bowel obstruction. Colonic fecal retention.     Echo (TTE) limited (PRN contrast/bubble/strain/3D)    Result Date: 4/3/2024    Left Ventricle: Normal left ventricular systolic function with a visually estimated EF of 60 - 65%. Left ventricle size is normal. Normal wall thickness. Normal wall motion. Diastolic dysfunction present with normal LV EF.   Right Ventricle: Right ventricle is mildly dilated.   Aortic Valve: Mild sclerosis of the aortic valve cusp.   Mitral Valve: Mildly thickened leaflet.   Tricuspid Valve: Severely elevated RVSP, consistent with severe pulmonary hypertension. The estimated RVSP is 76 mmHg.   Right Atrium: Right atrium is mildly dilated.   Image quality is technically difficult. Technically difficult study due to patient's body habitus and procedure performed with the patient in a supine 
Risk  (7/29/2022)    Overall Financial Resource Strain (CARDIA)     Difficulty of Paying Living Expenses: Not hard at all   Food Insecurity: No Food Insecurity (4/19/2024)    Hunger Vital Sign     Worried About Running Out of Food in the Last Year: Never true     Ran Out of Food in the Last Year: Never true   Transportation Needs: No Transportation Needs (4/19/2024)    PRAPARE - Transportation     Lack of Transportation (Medical): No     Lack of Transportation (Non-Medical): No   Physical Activity: Sufficiently Active (5/13/2022)    Exercise Vital Sign     Days of Exercise per Week: 3 days     Minutes of Exercise per Session: 60 min   Housing Stability: Low Risk  (4/19/2024)    Housing Stability Vital Sign     Unable to Pay for Housing in the Last Year: No     Number of Places Lived in the Last Year: 1     Unstable Housing in the Last Year: No       Family Hx:  Family History   Problem Relation Age of Onset    Cancer Mother 68        survived    Breast Cancer Mother     Hypertension Father     Diabetes Sister     Mental Illness Sister     Colon Cancer Neg Hx        LABS: Reviewed     CBC:  Lab Results   Component Value Date/Time    WBC 21.7 04/26/2024 04:46 AM    RBC 3.09 04/26/2024 04:46 AM    HGB 10.5 04/26/2024 04:46 AM    HCT 32.1 04/26/2024 04:46 AM    .9 04/26/2024 04:46 AM    MCH 34.0 04/26/2024 04:46 AM    MCHC 32.7 04/26/2024 04:46 AM    RDW 20.0 04/26/2024 04:46 AM    PLT 83 04/26/2024 04:46 AM    MPV 10.0 03/05/2020 12:00 AM     CBC with Differential:   Lab Results   Component Value Date/Time    WBC 21.7 04/26/2024 04:46 AM    RBC 3.09 04/26/2024 04:46 AM    HGB 10.5 04/26/2024 04:46 AM    HCT 32.1 04/26/2024 04:46 AM    PLT 83 04/26/2024 04:46 AM    .9 04/26/2024 04:46 AM    MCH 34.0 04/26/2024 04:46 AM    MCHC 32.7 04/26/2024 04:46 AM    RDW 20.0 04/26/2024 04:46 AM    NRBC 2 04/21/2024 06:00 AM    BANDSPCT 1 04/24/2024 05:13 AM    LYMPHOPCT 1.0 04/26/2024 04:46 AM    MONOPCT 1.0 
of pulmonary venous congestion. Chronic changes are noted in the left lower thorax. There is a dialysis catheter in place. There is a valve prosthesis in place. There are small  pleural effusions or blunting of both costophrenic angles.    Impression  NO SIGNIFICANT CHANGE SINCE THE PREVIOUS EXAM. THERE HAS BEEN PLACEMENT OF A DIALYSIS CATHETER SINCE THAT EXAM.      Results for orders placed during the hospital encounter of 04/20/22    XR CHEST (2 VW)    Narrative  EXAMINATION: XR CHEST (2 VW)    CLINICAL HISTORY: ABDOMINAL PAIN    COMPARISONS: CHEST RADIOGRAPH APRIL 8, 2022    FINDINGS: Median sternotomy.. Cardiopericardial silhouette upper limits normal, unchanged. Pulmonary vasculature normal. Right lung clear. Blunting left costophrenic angle again identified. Ill-defined area increased opacity lateral left lower chest  decreased since prior study.    Impression  BORDERLINE CARDIOMEGALY. LEFT PLEURAL EFFUSION VERSUS THICKENING. LEFT LOWER LUNG SCARRING VERSUS ATELECTASIS/PNEUMONIA.       Portable: Results for orders placed during the hospital encounter of 03/29/24    XR CHEST PORTABLE    Narrative  EXAMINATION:  ONE XRAY VIEW OF THE CHEST    4/9/2024 8:47 am    COMPARISON:  April 4th    HISTORY:  ORDERING SYSTEM PROVIDED HISTORY: resp failure  TECHNOLOGIST PROVIDED HISTORY:  Reason for exam:->resp failure  What reading provider will be dictating this exam?->CRC    FINDINGS:  A right-sided central venous catheter is seen, tip in the expected location  of the cavoatrial junction.  Slight undulation is seen in the mid loop of the  catheter, unchanged    The cardiac silhouette is enlarged.  Increased central pulmonary venous  markings are seen.  There is a small left pleural effusion    Impression  Cardiomegaly with increased central pulmonary venous markings and small left  pleural effusion.      Echo No results found for this or any previous visit.        Assessment:  This is a critically ill patient at risk of 
shoulders bilaterally.     1. No evidence of pulmonary embolism. 2. Small bilateral pleural effusions with mild interstitial edema. 3. Cardiomegaly with coronary artery calcification. 4. Ascites. 5. Chronic anterior wedging seen of T12 vertebral body level.     XR CHEST PORTABLE    Result Date: 4/9/2024  EXAMINATION: ONE XRAY VIEW OF THE CHEST 4/9/2024 8:47 am COMPARISON: April 4th HISTORY: ORDERING SYSTEM PROVIDED HISTORY: resp failure TECHNOLOGIST PROVIDED HISTORY: Reason for exam:->resp failure What reading provider will be dictating this exam?->CRC FINDINGS: A right-sided central venous catheter is seen, tip in the expected location of the cavoatrial junction.  Slight undulation is seen in the mid loop of the catheter, unchanged The cardiac silhouette is enlarged.  Increased central pulmonary venous markings are seen.  There is a small left pleural effusion     Cardiomegaly with increased central pulmonary venous markings and small left pleural effusion.     Vascular duplex upper extremity arteries bilateral    Result Date: 4/5/2024  EXAMINATION: DUPLEX ARTERAL ULTRASOUND OF THE BILATERAL UPPER EXTREMITIES  4/5/2024 3:38 pm TECHNIQUE: Duplex ultrasound using B-mode/gray scaled imaging, Doppler spectral analysis and color flow Doppler was obtained of the bilateral arterial upper extremities. COMPARISON: None. HISTORY: ORDERING SYSTEM PROVIDED HISTORY: Diminished pulses or claudication, arm FINDINGS: The subclavian arteries are patent bilaterally.  There is good flow involving the right subclavian artery, axillary artery, and brachial artery.  There is diminished flow inferior to the failed right-sided fistula.  There is flow identified within the right radial artery.  There is good flow involving the left subclavian and left axillary arteries.  The left-sided fistula is patent. Right: Flow velocities were measured as follows: Subclavian   198 cm/sec Axillary        160 cm/sec Brachial       120 cm/sec Radial      
structures.  Likely chronic fracture deformities in the lower thoracic spine.     1. Moderate to large amount of perihepatic, perisplenic, and lower abdominal ascites.  No evidence of retroperitoneal bleed 2. Slightly nodular contour of the liver. 3. Cardiomegaly with small effusions and mild chronic reticulation of lung markings.          Assessment and plan:  Palliative care encounter: -Explained the role of palliative care in treating patient. Discussed symptom management related to chronic disease/condition. Provided emotional support and active listening. Patient understands and is agreeable to current plan.  Patient is acutely ill and is being cared for at LTAC facility.  Patient would benefit from palliative care once patient was well enough to be discharged to nursing facility and/or home.  Palliative care will continue to tentatively follow-up patient is hospitalized.    Hospice: Patient is hospice care appropriate, however, hospice care does not currently align with patient's goals of care at this time.    Advance Care Planning: Discussed goals of care with patient. Explained in extensive detail nuances between full code, DNR CCA and DNR CC. Patient has made the decision to be FULL CODE.  Patient has previously elected her daughter Paulina as HCPOA,, copy currently on file (10/9/2019).  Updated patient contacts as previous secondary healthcare power of /her  is .    Goals of Care Discussion: Disease process and goals of treatment were discussed in basic terms. Michelle's goal is to optimize available comfort care measures and continue with current plan of care. We discussed the palliative care philosophy in light of those goals. We discussed all care options contingent on treatment response and QOL. Much active listening, presence, and emotional support were given.     Health education/counseling: Patient wishes to remain a FULL CODE with aggressive treatment options after reviewing 
utilized to reduce the radiation dose to as low as reasonably achievable. COMPARISON: None. HISTORY: ORDERING SYSTEM PROVIDED HISTORY: evaluate for retroperitoneal bleed TECHNOLOGIST PROVIDED HISTORY: Reason for exam:->evaluate for retroperitoneal bleed Additional Contrast?->None What reading provider will be dictating this exam?->CRC FINDINGS: Lower Chest: Cardiomegaly seen with small effusions and mild chronic reticulation of lung markings Organs: Slightly nodular contour of the liver.  Small gallstone.  Advanced pancreatic atrophy.  The spleen is normal in size.  There is advanced bilateral renal atrophy. GI/Bowel: Small hiatal hernia. Pelvis: No pelvic mass, adenopathy, or fluid collection. Peritoneum/Retroperitoneum: There is no lymphadenopathy.  Portal venous and venous structures are unremarkable.  Arterial calcifications are observed. Moderate to large amount of perihepatic, perisplenic, and lower abdominal homogeneous fluid attenuation ascites Bones/Soft Tissues: Mild anasarca.  Muscular atrophy observed.  Degenerative changes of the osseous structures.  Likely chronic fracture deformities in the lower thoracic spine.     1. Moderate to large amount of perihepatic, perisplenic, and lower abdominal ascites.  No evidence of retroperitoneal bleed 2. Slightly nodular contour of the liver. 3. Cardiomegaly with small effusions and mild chronic reticulation of lung markings.          Assessment and plan:  Palliative care encounter: -Explained the role of palliative care in treating patient. Discussed symptom management related to chronic disease/condition. Provided emotional support and active listening. Patient understands and is agreeable to current plan.  Patient is acutely ill and is being cared for at LTAC facility.  Patient would benefit from palliative care once patient was well enough to be discharged to nursing facility and/or home.  Palliative care will continue to tentatively follow-up patient is 
SYSTEM PROVIDED HISTORY: Follow-up, air in  right atrium,  TECHNOLOGIST PROVIDED HISTORY:  Reason for exam:->Follow-up, air in  right atrium,  What reading provider will be dictating this exam?->CRC    FINDINGS:  Mediastinum: There is resolution of the previously seen air in the right  atrium.  Advanced cardiomegaly.  No lymphadenopathy.  Median thoracotomy  change.    Lungs/pleura: Small bilateral pleural effusions are seen.  There is slight  lower lung zone interlobular septal thickening.  There is no alveolar  consolidation.    Upper Abdomen: Slight nodularity of the contour of the liver.  There is mild  perihepatic and perisplenic ascitic fluid.    Soft Tissues/Bones: There is mild compression deformity of an upper lumbar  vertebral body.    Impression  1. Resolution of the previously seen air in the right atrium.  2. Advanced cardiomegaly with small bilateral pleural effusions and slight  lower lung zone interlobular septal thickening.  3. Slight nodularity of the contour of the liver with mild perihepatic and  perisplenic ascitic fluid.  4. Mild compression deformity of an upper lumbar vertebral body.      CXR      2-view: Results for orders placed during the hospital encounter of 06/30/22    XR CHEST (2 VW)    Narrative  EXAMINATION:  CHEST.    CLINICAL HISTORY:  CONFUSION.    COMPARISONS:  6/2/2022.    TECHNIQUE:  AP and lateral.    FINDINGS: There is mild to moderate cardiomegaly. No definite evidence of pulmonary venous congestion. Chronic changes are noted in the left lower thorax. There is a dialysis catheter in place. There is a valve prosthesis in place. There are small  pleural effusions or blunting of both costophrenic angles.    Impression  NO SIGNIFICANT CHANGE SINCE THE PREVIOUS EXAM. THERE HAS BEEN PLACEMENT OF A DIALYSIS CATHETER SINCE THAT EXAM.      Results for orders placed during the hospital encounter of 04/20/22    XR CHEST (2 VW)    Narrative  EXAMINATION: XR CHEST (2 VW)    CLINICAL

## 2024-06-17 RX ORDER — ISOSORBIDE MONONITRATE 30 MG/1
120 TABLET, EXTENDED RELEASE ORAL DAILY
Qty: 120 TABLET | Refills: 10 | OUTPATIENT
Start: 2024-06-17

## 2024-06-24 ENCOUNTER — TELEPHONE (OUTPATIENT)
Dept: ENDOCRINOLOGY | Age: 64
End: 2024-06-24

## 2024-06-24 RX ORDER — INSULIN GLARGINE 100 [IU]/ML
INJECTION, SOLUTION SUBCUTANEOUS
Qty: 15 ADJUSTABLE DOSE PRE-FILLED PEN SYRINGE | Refills: 3 | Status: SHIPPED | OUTPATIENT
Start: 2024-06-24

## 2024-06-24 RX ORDER — INSULIN ASPART 100 [IU]/ML
INJECTION, SOLUTION INTRAVENOUS; SUBCUTANEOUS
Qty: 5 ADJUSTABLE DOSE PRE-FILLED PEN SYRINGE | Refills: 3 | Status: SHIPPED | OUTPATIENT
Start: 2024-06-24

## 2024-06-24 NOTE — TELEPHONE ENCOUNTER
Pt needs a refill on both of her insulins.  She was in the hospital for a long time and not sure the dose she is supposed to be on. Please send in for her with the doses you want her to be on.

## 2024-06-30 ENCOUNTER — HOSPITAL ENCOUNTER (EMERGENCY)
Age: 64
Discharge: HOME | End: 2024-07-01
Payer: MEDICARE

## 2024-06-30 ENCOUNTER — APPOINTMENT (OUTPATIENT)
Dept: CT IMAGING | Age: 64
End: 2024-06-30
Payer: MEDICARE

## 2024-06-30 DIAGNOSIS — N18.6 STAGE 5 CHRONIC KIDNEY DISEASE ON CHRONIC DIALYSIS (HCC): ICD-10-CM

## 2024-06-30 DIAGNOSIS — K80.20 CALCULUS OF GALLBLADDER WITHOUT CHOLECYSTITIS WITHOUT OBSTRUCTION: ICD-10-CM

## 2024-06-30 DIAGNOSIS — E11.65 TYPE 2 DIABETES MELLITUS WITH HYPERGLYCEMIA, WITH LONG-TERM CURRENT USE OF INSULIN (HCC): ICD-10-CM

## 2024-06-30 DIAGNOSIS — R18.8 OTHER ASCITES: Primary | ICD-10-CM

## 2024-06-30 DIAGNOSIS — Z99.2 STAGE 5 CHRONIC KIDNEY DISEASE ON CHRONIC DIALYSIS (HCC): ICD-10-CM

## 2024-06-30 DIAGNOSIS — Z79.4 TYPE 2 DIABETES MELLITUS WITH HYPERGLYCEMIA, WITH LONG-TERM CURRENT USE OF INSULIN (HCC): ICD-10-CM

## 2024-06-30 DIAGNOSIS — R10.32 LEFT LOWER QUADRANT ABDOMINAL PAIN: ICD-10-CM

## 2024-06-30 LAB
ALBUMIN SERPL-MCNC: 3.9 G/DL (ref 3.5–4.6)
ALP SERPL-CCNC: 107 U/L (ref 40–130)
ALT SERPL-CCNC: 8 U/L (ref 0–33)
ANION GAP SERPL CALCULATED.3IONS-SCNC: 13 MEQ/L (ref 9–15)
AST SERPL-CCNC: 12 U/L (ref 0–35)
BASOPHILS # BLD: 0 K/UL (ref 0–0.2)
BASOPHILS NFR BLD: 0.3 %
BILIRUB SERPL-MCNC: 0.8 MG/DL (ref 0.2–0.7)
BUN SERPL-MCNC: 21 MG/DL (ref 8–23)
CALCIUM SERPL-MCNC: 10.5 MG/DL (ref 8.5–9.9)
CHLORIDE SERPL-SCNC: 93 MEQ/L (ref 95–107)
CO2 SERPL-SCNC: 30 MEQ/L (ref 20–31)
CREAT SERPL-MCNC: 3.73 MG/DL (ref 0.5–0.9)
EOSINOPHIL # BLD: 0.1 K/UL (ref 0–0.7)
EOSINOPHIL NFR BLD: 0.7 %
ERYTHROCYTE [DISTWIDTH] IN BLOOD BY AUTOMATED COUNT: 15 % (ref 11.5–14.5)
GLOBULIN SER CALC-MCNC: 3.2 G/DL (ref 2.3–3.5)
GLUCOSE SERPL-MCNC: 204 MG/DL (ref 70–99)
HCT VFR BLD AUTO: 31.8 % (ref 37–47)
HGB BLD-MCNC: 10.1 G/DL (ref 12–16)
LACTATE BLDV-SCNC: 2.1 MMOL/L (ref 0.5–2.2)
LIPASE SERPL-CCNC: 13 U/L (ref 12–95)
LYMPHOCYTES # BLD: 1.5 K/UL (ref 1–4.8)
LYMPHOCYTES NFR BLD: 11 %
MCH RBC QN AUTO: 29.9 PG (ref 27–31.3)
MCHC RBC AUTO-ENTMCNC: 31.8 % (ref 33–37)
MCV RBC AUTO: 94.1 FL (ref 79.4–94.8)
MONOCYTES # BLD: 1 K/UL (ref 0.2–0.8)
MONOCYTES NFR BLD: 7.2 %
NEUTROPHILS # BLD: 10.9 K/UL (ref 1.4–6.5)
NEUTS SEG NFR BLD: 80.5 %
PLATELET # BLD AUTO: 229 K/UL (ref 130–400)
POTASSIUM SERPL-SCNC: 3.3 MEQ/L (ref 3.4–4.9)
PROT SERPL-MCNC: 7.1 G/DL (ref 6.3–8)
RBC # BLD AUTO: 3.38 M/UL (ref 4.2–5.4)
SODIUM SERPL-SCNC: 136 MEQ/L (ref 135–144)
WBC # BLD AUTO: 13.5 K/UL (ref 4.8–10.8)

## 2024-06-30 PROCEDURE — 80053 COMPREHEN METABOLIC PANEL: CPT

## 2024-06-30 PROCEDURE — 83605 ASSAY OF LACTIC ACID: CPT

## 2024-06-30 PROCEDURE — 85025 COMPLETE CBC W/AUTO DIFF WBC: CPT

## 2024-06-30 PROCEDURE — 6360000002 HC RX W HCPCS: Performed by: PHYSICIAN ASSISTANT

## 2024-06-30 PROCEDURE — 36415 COLL VENOUS BLD VENIPUNCTURE: CPT

## 2024-06-30 PROCEDURE — 83690 ASSAY OF LIPASE: CPT

## 2024-06-30 PROCEDURE — 6370000000 HC RX 637 (ALT 250 FOR IP): Performed by: PHYSICIAN ASSISTANT

## 2024-06-30 PROCEDURE — 99284 EMERGENCY DEPT VISIT MOD MDM: CPT

## 2024-06-30 PROCEDURE — 96374 THER/PROPH/DIAG INJ IV PUSH: CPT

## 2024-06-30 RX ORDER — ONDANSETRON 2 MG/ML
4 INJECTION INTRAMUSCULAR; INTRAVENOUS ONCE
Status: COMPLETED | OUTPATIENT
Start: 2024-06-30 | End: 2024-06-30

## 2024-06-30 RX ORDER — MIDODRINE HYDROCHLORIDE 5 MG/1
5 TABLET ORAL ONCE
Status: COMPLETED | OUTPATIENT
Start: 2024-06-30 | End: 2024-06-30

## 2024-06-30 RX ADMIN — ONDANSETRON 4 MG: 2 INJECTION INTRAMUSCULAR; INTRAVENOUS at 23:40

## 2024-06-30 RX ADMIN — MIDODRINE HYDROCHLORIDE 5 MG: 5 TABLET ORAL at 23:40

## 2024-06-30 ASSESSMENT — PAIN - FUNCTIONAL ASSESSMENT: PAIN_FUNCTIONAL_ASSESSMENT: 0-10

## 2024-06-30 ASSESSMENT — PAIN DESCRIPTION - DESCRIPTORS: DESCRIPTORS: ACHING

## 2024-06-30 ASSESSMENT — PAIN DESCRIPTION - LOCATION: LOCATION: ABDOMEN

## 2024-06-30 ASSESSMENT — PAIN DESCRIPTION - PAIN TYPE: TYPE: ACUTE PAIN

## 2024-06-30 ASSESSMENT — PAIN DESCRIPTION - ORIENTATION: ORIENTATION: MID;LOWER

## 2024-06-30 ASSESSMENT — PAIN DESCRIPTION - FREQUENCY: FREQUENCY: INTERMITTENT

## 2024-06-30 ASSESSMENT — PAIN SCALES - GENERAL: PAINLEVEL_OUTOF10: 5

## 2024-07-01 ENCOUNTER — APPOINTMENT (OUTPATIENT)
Dept: CT IMAGING | Age: 64
End: 2024-07-01
Payer: MEDICARE

## 2024-07-01 VITALS
OXYGEN SATURATION: 95 % | TEMPERATURE: 98.1 F | HEIGHT: 67 IN | WEIGHT: 270 LBS | DIASTOLIC BLOOD PRESSURE: 60 MMHG | SYSTOLIC BLOOD PRESSURE: 124 MMHG | HEART RATE: 89 BPM | BODY MASS INDEX: 42.38 KG/M2 | RESPIRATION RATE: 20 BRPM

## 2024-07-01 DIAGNOSIS — R18.8 OTHER ASCITES: Primary | ICD-10-CM

## 2024-07-01 LAB
APTT PPP: 31.4 SEC (ref 24.4–36.8)
INR PPP: 1.3
PROCALCITONIN SERPL IA-MCNC: 0.25 NG/ML (ref 0–0.15)
PROTHROMBIN TIME: 16.7 SEC (ref 12.3–14.9)

## 2024-07-01 PROCEDURE — 74176 CT ABD & PELVIS W/O CONTRAST: CPT

## 2024-07-01 PROCEDURE — 85610 PROTHROMBIN TIME: CPT

## 2024-07-01 PROCEDURE — 84145 PROCALCITONIN (PCT): CPT

## 2024-07-01 PROCEDURE — 36415 COLL VENOUS BLD VENIPUNCTURE: CPT

## 2024-07-01 PROCEDURE — 85730 THROMBOPLASTIN TIME PARTIAL: CPT

## 2024-07-01 RX ORDER — INSULIN LISPRO 100 [IU]/ML
INJECTION, SOLUTION INTRAVENOUS; SUBCUTANEOUS
Qty: 30 ML | Refills: 3 | Status: SHIPPED | OUTPATIENT
Start: 2024-07-01

## 2024-07-01 NOTE — ED TRIAGE NOTES
Patient to ER with abdominal pain x1 month. Worsening x1 day with increased fluid retention on abdomen and in public area. Last dialysis yesterday, does not produce urine. No emesis, has nausea.

## 2024-07-01 NOTE — ED PROVIDER NOTES
[facility] ED  EMERGENCY DEPARTMENT ENCOUNTER      Pt Name: Michelle Le  MRN: [id number]  Birthdate 1960  Date of evaluation: 6/30/2024  Provider: Veto Gray PA-C  11:55 PM EDT    CHIEF COMPLAINT       Chief Complaint   Patient presents with    Abdominal Pain         HISTORY OF PRESENT ILLNESS   (Location/Symptom, Timing/Onset, Context/Setting, Quality, Duration, Modifying Factors, Severity)  Note limiting factors.   Michelle Le is a 64 y.o. female who presents to the emergency department patient has abdominal pain and bloating started 2 weeks ago.  Patient has abdominal pain which also started 2 weeks ago she does take Brilinta for blood thinner.  She has a history of CKD close dialysis Tuesday Thursday Saturday went to dialysis yesterday.  Patient has history of GI bleeding.  Was just released from nursing home 2 weeks ago.  She does have history of low blood pressure takes midodrine she is history of single paracentesis done while in hospital.  She does have stool output for 5 times a day but takes stool softeners.  Symptoms moderate severity worse with touch or motion nothing improves her symptoms    HPI    Nursing Notes were reviewed.    REVIEW OF SYSTEMS    (2-9 systems for level 4, 10 or more for level 5)     Review of Systems   Constitutional:  Negative for chills and fever.   HENT:  Negative for ear discharge, nosebleeds and voice change.    Eyes:  Negative for discharge.   Respiratory:  Negative for apnea and shortness of breath.    Cardiovascular:  Positive for leg swelling. Negative for chest pain.   Gastrointestinal:  Positive for abdominal pain. Negative for abdominal distention, anal bleeding, blood in stool, constipation and vomiting.   Genitourinary:         Patient does not make urine secondary to CKD   Musculoskeletal:  Negative for joint swelling.   Skin:  Negative for pallor.   Neurological:  Negative for seizures and facial asymmetry.   Psychiatric/Behavioral:

## 2024-07-01 NOTE — ED NOTES
Physicians here to transport patient home.   D/C instructions given to patient no questions ask.Patient verbalized understanding.

## 2024-07-05 ENCOUNTER — TELEPHONE (OUTPATIENT)
Dept: INTERVENTIONAL RADIOLOGY/VASCULAR | Age: 64
End: 2024-07-05

## 2024-07-05 NOTE — TELEPHONE ENCOUNTER
Pre procedure phone call made to patient re:paracentesis scheduled for 7/8/24. The patient was given the following instructions:    Arrive at diagnostic imaging check in desk at 1400 on 7/8/24.  Do not take Eliquis 7/6 or 7/7.   Pt verbalized understanding and all questions answered.

## 2024-07-08 ENCOUNTER — HOSPITAL ENCOUNTER (OUTPATIENT)
Dept: INTERVENTIONAL RADIOLOGY/VASCULAR | Age: 64
Discharge: HOME | End: 2024-07-10
Payer: MEDICARE

## 2024-07-08 VITALS — HEART RATE: 89 BPM | OXYGEN SATURATION: 96 % | DIASTOLIC BLOOD PRESSURE: 55 MMHG | SYSTOLIC BLOOD PRESSURE: 133 MMHG

## 2024-07-08 DIAGNOSIS — R18.8 OTHER ASCITES: ICD-10-CM

## 2024-07-08 PROCEDURE — 6360000002 HC RX W HCPCS: Performed by: RADIOLOGY

## 2024-07-08 PROCEDURE — 2709999900 IR US GUIDED PARACENTESIS

## 2024-07-08 PROCEDURE — P9047 ALBUMIN (HUMAN), 25%, 50ML: HCPCS | Performed by: RADIOLOGY

## 2024-07-08 PROCEDURE — 49083 ABD PARACENTESIS W/IMAGING: CPT | Performed by: RADIOLOGY

## 2024-07-08 PROCEDURE — 96365 THER/PROPH/DIAG IV INF INIT: CPT

## 2024-07-08 PROCEDURE — 2500000003 HC RX 250 WO HCPCS: Performed by: RADIOLOGY

## 2024-07-08 PROCEDURE — 49083 ABD PARACENTESIS W/IMAGING: CPT

## 2024-07-08 RX ORDER — LIDOCAINE HYDROCHLORIDE 20 MG/ML
INJECTION, SOLUTION INFILTRATION; PERINEURAL PRN
Status: COMPLETED | OUTPATIENT
Start: 2024-07-08 | End: 2024-07-08

## 2024-07-08 RX ORDER — ALBUMIN (HUMAN) 12.5 G/50ML
SOLUTION INTRAVENOUS CONTINUOUS PRN
Status: COMPLETED | OUTPATIENT
Start: 2024-07-08 | End: 2024-07-08

## 2024-07-08 RX ADMIN — LIDOCAINE HYDROCHLORIDE 9 ML: 20 INJECTION, SOLUTION INFILTRATION; PERINEURAL at 14:34

## 2024-07-08 RX ADMIN — ALBUMIN (HUMAN) 50 G: 12.5 SOLUTION INTRAVENOUS at 15:00

## 2024-07-08 NOTE — DISCHARGE INSTRUCTIONS
Ultrasound Guided Paracentesis    Activity:  Rest, avoid strenuous activity such as lifting heavy objects, and bending, stretching or pulling.     Diet:  Resume usual diet    Medication:  * Resume home medication unless otherwise instructed by your physician.  * (If Applicable) If taking any blood thinners, speak with your physician before restarting them.  * May take mild pain reliever, as needed, such as Acetaminophen or Ibuprofen.    INSTRUCTIONS OR COMMENTS RELATIVE TO SPECIFIC EXAM PERFORMED:    - Some tenderness at site of paracentesis will be normal for a few days.  - May remove band aid after 48 hours.   - Monitor the site for the next 24 - 48 hours.  - If you experience any drainage or bleeding at the site, hold pressure for 30 minutes and lay on side opposite of the procedure site (move fluid away from the   area of leakage).  If drainage or bleeding does not stop and seems excessive, call your physician or go to the ER for evaluation.   - If any signs of infection (redness, swelling, drainage/pus) at the site, go to ER for evaluation.   - Please keep all follow-up appointments with your Hepatologist. Schedule follow-up appointment for repeat paracentesis if necessary.       IF YOU DEVELOP ANY OF THE FOLLOWING SYMPTOMS, CONTACT PHYSICIAN LISTED BELOW:  Physician performing procedure: DR. White - [phone] or [phone] and ask to be put in contact with the RADIOLOGY RN. After hours contact ER.  * Extreme pain that increases after leaving the hospital  * Active bleeding (refer to above instructions)  * Chills, fever above 100 degrees Farenheit and fatigue.  * Weakness, dizziness, lightheadedness or fainting.    If you are unable to contact any of the above physician and you feel you have a major problem, please go to the Emergency Room for treatment.    TOTAL REMOVED WITH PARACENTESIS TODAY: 7600 ml removed

## 2024-07-09 ENCOUNTER — POST-OP TELEPHONE (OUTPATIENT)
Dept: INTERVENTIONAL RADIOLOGY/VASCULAR | Age: 64
End: 2024-07-09

## 2024-07-09 NOTE — FLOWSHEET NOTE
Follow up call to patient to follow up post paracentesis. Pt states that her stomach is cramping a little. Pt informed that this can be normal finding post paracentesis and that if it does not resolve to call her PCP or ER. Pts daughter states that patient has been nauseated since last night - again, pt instructed that if nausea does not resolve to seek medical care at ER, as that is not a common finding post paracentesis. Pt states that her bandage to paracentesis site was leaking last night, a new bandage was applied and leaking has resolved. Electronically signed by Radhika Anaya RN on 7/9/2024 at 9:42 AM

## 2024-07-10 RX ORDER — AMOXICILLIN 500 MG/1
CAPSULE ORAL
Qty: 60 CAPSULE | Refills: 1 | Status: SHIPPED | OUTPATIENT
Start: 2024-07-10

## 2024-07-15 ENCOUNTER — HOSPITAL ENCOUNTER (EMERGENCY)
Age: 64
Discharge: HOME | End: 2024-07-16
Payer: MEDICARE

## 2024-07-15 DIAGNOSIS — T82.838A HEMORRHAGE OF ARTERIOVENOUS FISTULA, INITIAL ENCOUNTER (HCC): Primary | ICD-10-CM

## 2024-07-15 LAB
ALBUMIN SERPL-MCNC: 3.2 G/DL (ref 3.5–4.6)
ALP SERPL-CCNC: 115 U/L (ref 40–130)
ALT SERPL-CCNC: 10 U/L (ref 0–33)
ANION GAP SERPL CALCULATED.3IONS-SCNC: 17 MEQ/L (ref 9–15)
AST SERPL-CCNC: 20 U/L (ref 0–35)
BASOPHILS # BLD: 0.1 K/UL (ref 0–0.2)
BASOPHILS NFR BLD: 0.4 %
BILIRUB SERPL-MCNC: 0.8 MG/DL (ref 0.2–0.7)
BUN SERPL-MCNC: 41 MG/DL (ref 8–23)
CALCIUM SERPL-MCNC: 9.8 MG/DL (ref 8.5–9.9)
CHLORIDE SERPL-SCNC: 94 MEQ/L (ref 95–107)
CO2 SERPL-SCNC: 24 MEQ/L (ref 20–31)
CREAT SERPL-MCNC: 6.24 MG/DL (ref 0.5–0.9)
EOSINOPHIL # BLD: 0.3 K/UL (ref 0–0.7)
EOSINOPHIL NFR BLD: 2.3 %
ERYTHROCYTE [DISTWIDTH] IN BLOOD BY AUTOMATED COUNT: 15.2 % (ref 11.5–14.5)
GLOBULIN SER CALC-MCNC: 2.8 G/DL (ref 2.3–3.5)
GLUCOSE SERPL-MCNC: 105 MG/DL (ref 70–99)
HCT VFR BLD AUTO: 33.6 % (ref 37–47)
HGB BLD-MCNC: 10.8 G/DL (ref 12–16)
LYMPHOCYTES # BLD: 2 K/UL (ref 1–4.8)
LYMPHOCYTES NFR BLD: 14.5 %
MCH RBC QN AUTO: 29 PG (ref 27–31.3)
MCHC RBC AUTO-ENTMCNC: 32.1 % (ref 33–37)
MCV RBC AUTO: 90.1 FL (ref 79.4–94.8)
MONOCYTES # BLD: 1.1 K/UL (ref 0.2–0.8)
MONOCYTES NFR BLD: 8.4 %
NEUTROPHILS # BLD: 10.1 K/UL (ref 1.4–6.5)
NEUTS SEG NFR BLD: 73.8 %
PLATELET # BLD AUTO: 202 K/UL (ref 130–400)
POTASSIUM SERPL-SCNC: 5.1 MEQ/L (ref 3.4–4.9)
PROT SERPL-MCNC: 6 G/DL (ref 6.3–8)
RBC # BLD AUTO: 3.73 M/UL (ref 4.2–5.4)
SODIUM SERPL-SCNC: 135 MEQ/L (ref 135–144)
WBC # BLD AUTO: 13.6 K/UL (ref 4.8–10.8)

## 2024-07-15 PROCEDURE — 85025 COMPLETE CBC W/AUTO DIFF WBC: CPT

## 2024-07-15 PROCEDURE — 99284 EMERGENCY DEPT VISIT MOD MDM: CPT

## 2024-07-15 PROCEDURE — 6370000000 HC RX 637 (ALT 250 FOR IP): Performed by: PHYSICIAN ASSISTANT

## 2024-07-15 PROCEDURE — 80053 COMPREHEN METABOLIC PANEL: CPT

## 2024-07-15 PROCEDURE — 36415 COLL VENOUS BLD VENIPUNCTURE: CPT

## 2024-07-15 RX ORDER — ONDANSETRON 4 MG/1
4 TABLET, ORALLY DISINTEGRATING ORAL ONCE
Status: COMPLETED | OUTPATIENT
Start: 2024-07-15 | End: 2024-07-15

## 2024-07-15 RX ORDER — 0.9 % SODIUM CHLORIDE 0.9 %
250 INTRAVENOUS SOLUTION INTRAVENOUS ONCE
Status: DISCONTINUED | OUTPATIENT
Start: 2024-07-15 | End: 2024-07-16 | Stop reason: HOSPADM

## 2024-07-15 RX ORDER — MIDODRINE HYDROCHLORIDE 10 MG/1
20 TABLET ORAL ONCE
Status: COMPLETED | OUTPATIENT
Start: 2024-07-15 | End: 2024-07-15

## 2024-07-15 RX ADMIN — MIDODRINE HYDROCHLORIDE 20 MG: 10 TABLET ORAL at 23:25

## 2024-07-15 RX ADMIN — ONDANSETRON 4 MG: 4 TABLET, ORALLY DISINTEGRATING ORAL at 23:25

## 2024-07-15 ASSESSMENT — PAIN - FUNCTIONAL ASSESSMENT
PAIN_FUNCTIONAL_ASSESSMENT: NONE - DENIES PAIN
PAIN_FUNCTIONAL_ASSESSMENT: NONE - DENIES PAIN

## 2024-07-15 ASSESSMENT — ENCOUNTER SYMPTOMS
VOMITING: 0
NAUSEA: 0
COUGH: 0
VOICE CHANGE: 0
ABDOMINAL PAIN: 0
EYE DISCHARGE: 0

## 2024-07-16 VITALS
BODY MASS INDEX: 33.99 KG/M2 | WEIGHT: 217 LBS | TEMPERATURE: 97.6 F | RESPIRATION RATE: 17 BRPM | OXYGEN SATURATION: 100 % | SYSTOLIC BLOOD PRESSURE: 102 MMHG | DIASTOLIC BLOOD PRESSURE: 54 MMHG | HEART RATE: 74 BPM

## 2024-07-16 ASSESSMENT — PAIN - FUNCTIONAL ASSESSMENT
PAIN_FUNCTIONAL_ASSESSMENT: NONE - DENIES PAIN
PAIN_FUNCTIONAL_ASSESSMENT: NONE - DENIES PAIN

## 2024-07-16 NOTE — ED TRIAGE NOTES
Pt to ER per EMS from home with C/O bleeding from fistula sight. Pt reports she had a fistulagram with angioplasty today to open up fistula. On arrival bleeding minimal. Area cleaned and pressure dressing applied. Pt has bruising on arms and legs et reports normal for her. Pt alert and oriented x4. Pt cleaned up and new gown on.

## 2024-07-16 NOTE — ED PROVIDER NOTES
[facility] ED  EMERGENCY DEPARTMENT ENCOUNTER      Pt Name: Michelle Le  MRN: [id number]  Birthdate 1960  Date of evaluation: 7/15/2024  Provider: Veto Gray PA-C  9:08 PM EDT    CHIEF COMPLAINT       Chief Complaint   Patient presents with    Coagulation Disorder         HISTORY OF PRESENT ILLNESS   (Location/Symptom, Timing/Onset, Context/Setting, Quality, Duration, Modifying Factors, Severity)  Note limiting factors.   Michelle Le is a 64 y.o. female who presents to the emergency department per nursing report patient had anticoagulant placed and fistula today had bleeding afterwards.  Family is concerned patient had too much bleeding.  Patient denies fever chills nausea vomiting Dr. Luther evaluated patient bleeding currently stopped    HPI    Nursing Notes were reviewed.    REVIEW OF SYSTEMS    (2-9 systems for level 4, 10 or more for level 5)     Review of Systems   Constitutional:  Negative for activity change, appetite change and unexpected weight change.   HENT:  Negative for ear discharge, nosebleeds and voice change.    Eyes:  Negative for discharge.   Respiratory:  Negative for cough.    Cardiovascular:  Negative for chest pain.   Gastrointestinal:  Negative for abdominal pain, nausea and vomiting.   Musculoskeletal:  Negative for joint swelling.   Skin:  Positive for wound. Negative for pallor.   Neurological:  Negative for seizures and facial asymmetry.   Psychiatric/Behavioral:  Negative for behavioral problems, self-injury and sleep disturbance.    All other systems reviewed and are negative.      Except as noted above the remainder of the review of systems was reviewed and negative.       PAST MEDICAL HISTORY     Past Medical History:   Diagnosis Date    Angina at rest ([facility]) 5/24/2020    Anxiety     CAD S/P percutaneous coronary angioplasty 2015, 2018    stents per [facility]    CHF (congestive heart failure) ([facility])     CKD (chronic kidney disease) stage

## 2024-07-16 NOTE — ED NOTES
Pt and daughter requesting night time dose of midorine 20 mg PO instead of fluids. Pt was unable to have dialysis treatment yesterday and they are afraid of fluid overload especially if fistula is not working.

## 2024-07-16 NOTE — ED NOTES
Pt C/O nausea and some dizziness and requesting her PRN dose of zofran. PA notifed et see new order.

## 2024-07-16 NOTE — ED NOTES
Pt incontinent of stool. Pericare provided. New dressing applied to left lower arm where skin tear bleed through pts bandaid.

## 2024-07-21 RX ORDER — MIDODRINE HYDROCHLORIDE 10 MG/1
20 TABLET ORAL
Qty: 180 TABLET | Refills: 3 | Status: SHIPPED | OUTPATIENT
Start: 2024-07-21

## 2024-07-24 ENCOUNTER — OFFICE VISIT (OUTPATIENT)
Dept: ENDOCRINOLOGY | Age: 64
End: 2024-07-24
Payer: MEDICARE

## 2024-07-24 VITALS
HEIGHT: 67 IN | OXYGEN SATURATION: 90 % | WEIGHT: 217 LBS | SYSTOLIC BLOOD PRESSURE: 105 MMHG | DIASTOLIC BLOOD PRESSURE: 67 MMHG | BODY MASS INDEX: 34.06 KG/M2 | HEART RATE: 84 BPM

## 2024-07-24 DIAGNOSIS — E11.65 UNCONTROLLED TYPE 2 DIABETES MELLITUS WITH HYPERGLYCEMIA (HCC): Primary | ICD-10-CM

## 2024-07-24 LAB
CHP ED QC CHECK: NORMAL
GLUCOSE BLD-MCNC: 165 MG/DL
HBA1C MFR BLD: 5.7 %

## 2024-07-24 PROCEDURE — G9687 HOSPICE ANYTIME MSMT PER: HCPCS | Performed by: PHYSICIAN ASSISTANT

## 2024-07-24 PROCEDURE — 1036F TOBACCO NON-USER: CPT | Performed by: PHYSICIAN ASSISTANT

## 2024-07-24 PROCEDURE — G9692 HOSP RECD BY PT DUR MSMT PER: HCPCS | Performed by: PHYSICIAN ASSISTANT

## 2024-07-24 PROCEDURE — G8427 DOCREV CUR MEDS BY ELIG CLIN: HCPCS | Performed by: PHYSICIAN ASSISTANT

## 2024-07-24 PROCEDURE — G9714 PT IS W/HOSP DURING MSMT PER: HCPCS | Performed by: PHYSICIAN ASSISTANT

## 2024-07-24 PROCEDURE — G9709 HOSP SRV USED PT IN MSMT PER: HCPCS | Performed by: PHYSICIAN ASSISTANT

## 2024-07-24 PROCEDURE — G8417 CALC BMI ABV UP PARAM F/U: HCPCS | Performed by: PHYSICIAN ASSISTANT

## 2024-07-24 PROCEDURE — 99214 OFFICE O/P EST MOD 30 MIN: CPT | Performed by: PHYSICIAN ASSISTANT

## 2024-07-24 PROCEDURE — G9710 PT PROV HOSP SRV MSMT PER: HCPCS | Performed by: PHYSICIAN ASSISTANT

## 2024-07-24 PROCEDURE — 82962 GLUCOSE BLOOD TEST: CPT | Performed by: PHYSICIAN ASSISTANT

## 2024-07-24 PROCEDURE — 83036 HEMOGLOBIN GLYCOSYLATED A1C: CPT | Performed by: PHYSICIAN ASSISTANT

## 2024-07-24 PROCEDURE — 3074F SYST BP LT 130 MM HG: CPT | Performed by: PHYSICIAN ASSISTANT

## 2024-07-24 PROCEDURE — 3078F DIAST BP <80 MM HG: CPT | Performed by: PHYSICIAN ASSISTANT

## 2024-07-24 RX ORDER — ACYCLOVIR 400 MG/1
1 TABLET ORAL
Qty: 12 EACH | Refills: 10 | Status: ON HOLD | OUTPATIENT
Start: 2024-07-24

## 2024-07-24 RX ORDER — UBIQUINOL 100 MG
1 CAPSULE ORAL
Qty: 200 EACH | Refills: 1 | Status: ON HOLD | OUTPATIENT
Start: 2024-07-24

## 2024-07-24 RX ORDER — LANCETS 28 GAUGE
EACH MISCELLANEOUS
Qty: 150 EACH | Refills: 3 | Status: ON HOLD | OUTPATIENT
Start: 2024-07-24

## 2024-07-24 RX ORDER — INSULIN ASPART 100 [IU]/ML
INJECTION, SOLUTION INTRAVENOUS; SUBCUTANEOUS
Qty: 5 ADJUSTABLE DOSE PRE-FILLED PEN SYRINGE | Refills: 3 | Status: CANCELLED | OUTPATIENT
Start: 2024-07-24

## 2024-07-24 RX ORDER — INSULIN GLARGINE 100 [IU]/ML
INJECTION, SOLUTION SUBCUTANEOUS
Qty: 15 ML | Refills: 10 | Status: ON HOLD | OUTPATIENT
Start: 2024-07-24

## 2024-07-24 RX ORDER — GLUCOSAMINE HCL/CHONDROITIN SU 500-400 MG
1 CAPSULE ORAL
Qty: 150 STRIP | Refills: 3 | Status: ON HOLD | OUTPATIENT
Start: 2024-07-24

## 2024-07-24 RX ORDER — INSULIN GLARGINE 100 [IU]/ML
INJECTION, SOLUTION SUBCUTANEOUS
Qty: 15 ADJUSTABLE DOSE PRE-FILLED PEN SYRINGE | Refills: 3 | Status: CANCELLED | OUTPATIENT
Start: 2024-07-24

## 2024-07-24 RX ORDER — INSULIN LISPRO 100 [IU]/ML
INJECTION, SOLUTION INTRAVENOUS; SUBCUTANEOUS
Qty: 30 ML | Refills: 3 | Status: ON HOLD | OUTPATIENT
Start: 2024-07-24

## 2024-07-24 ASSESSMENT — ENCOUNTER SYMPTOMS
COUGH: 0
SORE THROAT: 0
NAUSEA: 0
SHORTNESS OF BREATH: 0
RHINORRHEA: 0
ROS SKIN COMMENTS: PRURITUS
ABDOMINAL PAIN: 0
WHEEZING: 0
SINUS PRESSURE: 0
VOMITING: 0
DIARRHEA: 0

## 2024-07-24 NOTE — PROGRESS NOTES
medications, activities and diet.      Side effects, adverse effects of the medication prescribed today, as well as treatment plan/ rationale and result expectations have been discussed with the patient who expresses understanding and desires to proceed.     Close follow up to evaluate treatment results and for coordination of care.  I have reviewed the patient's medical history in detail and updated the computerized patient record.

## 2024-07-24 NOTE — PATIENT INSTRUCTIONS
Endocrinology-    Check your blood sugars 4 times a day, before meals and at night  Document these numbers in a blood glucose log and bring them with you to your follow-up appointment.  If you are prescribed insulin, Do not take your mealtime insulin if your blood sugars less than 120   Call our office if you have blood sugars less than 80 or greater then 300 on two or more occasions  Call our office if you have any questions regarding your blood sugars or insulin dosing regiment  Signs of low blood sugar may include tremors, feeling shaky, sweating, dizziness, confusion and weakness. Check your blood sugar immediatly if you have any of these symptoms.     The plan as discussed at your appointment-   Decrease Lantus inject 35 units nightly  Humalog, inject 3 times daily before meals- 120-140 2 units, 141-160 3 units, 161-180 4 units, 181-200 5 units, 201-220 6 units, 221-240 7 units, 241-260 8 units, 261-280 9 units, 281-300 10 units, 301 or higher 11 units.    Dexcom G7 [facility] [employer] [facility]   Hydrocortisone 10 mg by mouth daily, 5 mg nightly  Repeat labs 1 week before your next appointment  Follow-up with me in 3 months    You have been prescribed the Dexcom G7 continuous glucose monitor (CGM).  This device reads your glucose levels in the interstitial fluid under your skin. This is not a blood glucose monitor.  The concentration of glucose in your blood is sometimes slightly higher than the concentration of glucose in the fluid under your skin.  This is a normal variation and is usually only a 5-15 difference.  You may notice a greater difference in the numbers between the blood and the device immediately after eating or activity.  This again is a normal variance and the levels will usually equal out over time.  You can use the number on the Dexcom CGM to monitor your glucose and take your medications or insulin.  If, for example, you get a low or high reading on the Dexcom and are asymptomatic, do a

## 2024-07-27 ENCOUNTER — HOSPITAL ENCOUNTER (INPATIENT)
Age: 64
LOS: 12 days | Discharge: HOSPICE | End: 2024-08-08
Attending: EMERGENCY MEDICINE
Payer: MEDICARE

## 2024-07-27 DIAGNOSIS — D50.0 BLOOD LOSS ANEMIA: ICD-10-CM

## 2024-07-27 DIAGNOSIS — I50.9 ACUTE DECOMPENSATED HEART FAILURE (HCC): ICD-10-CM

## 2024-07-27 DIAGNOSIS — N18.6 ESRD ON DIALYSIS (HCC): ICD-10-CM

## 2024-07-27 DIAGNOSIS — Z99.2 ESRD ON DIALYSIS (HCC): ICD-10-CM

## 2024-07-27 DIAGNOSIS — E86.1 HYPOTENSION DUE TO HYPOVOLEMIA: Primary | ICD-10-CM

## 2024-07-27 DIAGNOSIS — R18.8 OTHER ASCITES: ICD-10-CM

## 2024-07-27 DIAGNOSIS — N18.9 CHRONIC RENAL FAILURE, UNSPECIFIED CKD STAGE: ICD-10-CM

## 2024-07-27 DIAGNOSIS — R06.02 SHORTNESS OF BREATH: ICD-10-CM

## 2024-07-27 DIAGNOSIS — R41.89 UNRESPONSIVENESS: ICD-10-CM

## 2024-07-27 DIAGNOSIS — T82.838A HEMORRHAGE OF ARTERIOVENOUS FISTULA, INITIAL ENCOUNTER (HCC): ICD-10-CM

## 2024-07-27 PROBLEM — D64.9 ACUTE ANEMIA: Status: ACTIVE | Noted: 2024-07-27

## 2024-07-27 LAB
ABO + RH BLD: NORMAL
ALBUMIN SERPL-MCNC: 2.6 G/DL (ref 3.5–4.6)
ALP SERPL-CCNC: 101 U/L (ref 40–130)
ALT SERPL-CCNC: 7 U/L (ref 0–33)
ANION GAP SERPL CALCULATED.3IONS-SCNC: 12 MEQ/L (ref 9–15)
APTT PPP: 34.4 SEC (ref 24.4–36.8)
AST SERPL-CCNC: 11 U/L (ref 0–35)
BASOPHILS # BLD: 0 K/UL (ref 0–0.2)
BASOPHILS NFR BLD: 0.3 %
BILIRUB SERPL-MCNC: 0.5 MG/DL (ref 0.2–0.7)
BLD GP AB SCN SERPL QL: NORMAL
BLOOD BANK DISPENSE STATUS: NORMAL
BLOOD BANK PRODUCT CODE: NORMAL
BPU ID: NORMAL
BUN SERPL-MCNC: 29 MG/DL (ref 8–23)
CALCIUM SERPL-MCNC: 9.4 MG/DL (ref 8.5–9.9)
CHLORIDE SERPL-SCNC: 101 MEQ/L (ref 95–107)
CO2 SERPL-SCNC: 28 MEQ/L (ref 20–31)
CREAT SERPL-MCNC: 4.2 MG/DL (ref 0.5–0.9)
DESCRIPTION BLOOD BANK: NORMAL
EOSINOPHIL # BLD: 0.6 K/UL (ref 0–0.7)
EOSINOPHIL NFR BLD: 4.8 %
ERYTHROCYTE [DISTWIDTH] IN BLOOD BY AUTOMATED COUNT: 17.5 % (ref 11.5–14.5)
ESTIMATED AVERAGE GLUCOSE: 126 MG/DL
GLOBULIN SER CALC-MCNC: 2.5 G/DL (ref 2.3–3.5)
GLUCOSE BLD-MCNC: 171 MG/DL (ref 70–99)
GLUCOSE BLD-MCNC: 182 MG/DL (ref 70–99)
GLUCOSE BLD-MCNC: 304 MG/DL (ref 70–99)
GLUCOSE SERPL-MCNC: 211 MG/DL (ref 70–99)
HBA1C MFR BLD: 6 % (ref 4–6)
HCT VFR BLD AUTO: 22.2 % (ref 37–47)
HCT VFR BLD AUTO: 29.6 % (ref 37–47)
HGB BLD-MCNC: 6.9 G/DL (ref 12–16)
HGB BLD-MCNC: 9.2 G/DL (ref 12–16)
INR PPP: 1.7
LYMPHOCYTES # BLD: 2.7 K/UL (ref 1–4.8)
LYMPHOCYTES NFR BLD: 22.9 %
MCH RBC QN AUTO: 30.1 PG (ref 27–31.3)
MCHC RBC AUTO-ENTMCNC: 31.1 % (ref 33–37)
MCV RBC AUTO: 96.9 FL (ref 79.4–94.8)
MONOCYTES # BLD: 1 K/UL (ref 0.2–0.8)
MONOCYTES NFR BLD: 8.2 %
NEUTROPHILS # BLD: 7.3 K/UL (ref 1.4–6.5)
NEUTS SEG NFR BLD: 63.4 %
PERFORMED ON: ABNORMAL
PLATELET # BLD AUTO: 170 K/UL (ref 130–400)
POTASSIUM SERPL-SCNC: 3.6 MEQ/L (ref 3.4–4.9)
PROT SERPL-MCNC: 5.1 G/DL (ref 6.3–8)
PROTHROMBIN TIME: 19.8 SEC (ref 12.3–14.9)
RBC # BLD AUTO: 2.29 M/UL (ref 4.2–5.4)
SODIUM SERPL-SCNC: 141 MEQ/L (ref 135–144)
WBC # BLD AUTO: 11.6 K/UL (ref 4.8–10.8)

## 2024-07-27 PROCEDURE — P9047 ALBUMIN (HUMAN), 25%, 50ML: HCPCS | Performed by: INTERNAL MEDICINE

## 2024-07-27 PROCEDURE — 80053 COMPREHEN METABOLIC PANEL: CPT

## 2024-07-27 PROCEDURE — 2580000003 HC RX 258: Performed by: FAMILY MEDICINE

## 2024-07-27 PROCEDURE — 99285 EMERGENCY DEPT VISIT HI MDM: CPT

## 2024-07-27 PROCEDURE — 86901 BLOOD TYPING SEROLOGIC RH(D): CPT

## 2024-07-27 PROCEDURE — 6360000002 HC RX W HCPCS: Performed by: INTERNAL MEDICINE

## 2024-07-27 PROCEDURE — 6370000000 HC RX 637 (ALT 250 FOR IP): Performed by: FAMILY MEDICINE

## 2024-07-27 PROCEDURE — 2580000003 HC RX 258: Performed by: EMERGENCY MEDICINE

## 2024-07-27 PROCEDURE — 6360000002 HC RX W HCPCS: Performed by: EMERGENCY MEDICINE

## 2024-07-27 PROCEDURE — 36415 COLL VENOUS BLD VENIPUNCTURE: CPT

## 2024-07-27 PROCEDURE — 2000000000 HC ICU R&B

## 2024-07-27 PROCEDURE — 83036 HEMOGLOBIN GLYCOSYLATED A1C: CPT

## 2024-07-27 PROCEDURE — 3E033XZ INTRODUCTION OF VASOPRESSOR INTO PERIPHERAL VEIN, PERCUTANEOUS APPROACH: ICD-10-PCS | Performed by: STUDENT IN AN ORGANIZED HEALTH CARE EDUCATION/TRAINING PROGRAM

## 2024-07-27 PROCEDURE — 3E033XZ INTRODUCTION OF VASOPRESSOR INTO PERIPHERAL VEIN, PERCUTANEOUS APPROACH: ICD-10-PCS

## 2024-07-27 PROCEDURE — 86900 BLOOD TYPING SEROLOGIC ABO: CPT

## 2024-07-27 PROCEDURE — 86923 COMPATIBILITY TEST ELECTRIC: CPT

## 2024-07-27 PROCEDURE — 85730 THROMBOPLASTIN TIME PARTIAL: CPT

## 2024-07-27 PROCEDURE — 2500000003 HC RX 250 WO HCPCS: Performed by: INTERNAL MEDICINE

## 2024-07-27 PROCEDURE — 6370000000 HC RX 637 (ALT 250 FOR IP): Performed by: EMERGENCY MEDICINE

## 2024-07-27 PROCEDURE — P9016 RBC LEUKOCYTES REDUCED: HCPCS

## 2024-07-27 PROCEDURE — 86850 RBC ANTIBODY SCREEN: CPT

## 2024-07-27 PROCEDURE — 85018 HEMOGLOBIN: CPT

## 2024-07-27 PROCEDURE — 93005 ELECTROCARDIOGRAM TRACING: CPT | Performed by: EMERGENCY MEDICINE

## 2024-07-27 PROCEDURE — 85025 COMPLETE CBC W/AUTO DIFF WBC: CPT

## 2024-07-27 PROCEDURE — 96374 THER/PROPH/DIAG INJ IV PUSH: CPT

## 2024-07-27 PROCEDURE — 85610 PROTHROMBIN TIME: CPT

## 2024-07-27 PROCEDURE — 85014 HEMATOCRIT: CPT

## 2024-07-27 PROCEDURE — 30233N1 TRANSFUSION OF NONAUTOLOGOUS RED BLOOD CELLS INTO PERIPHERAL VEIN, PERCUTANEOUS APPROACH: ICD-10-PCS

## 2024-07-27 PROCEDURE — 30233N1 TRANSFUSION OF NONAUTOLOGOUS RED BLOOD CELLS INTO PERIPHERAL VEIN, PERCUTANEOUS APPROACH: ICD-10-PCS | Performed by: STUDENT IN AN ORGANIZED HEALTH CARE EDUCATION/TRAINING PROGRAM

## 2024-07-27 RX ORDER — ONDANSETRON 4 MG/1
4 TABLET, ORALLY DISINTEGRATING ORAL EVERY 8 HOURS PRN
Status: DISCONTINUED | OUTPATIENT
Start: 2024-07-27 | End: 2024-08-08 | Stop reason: HOSPADM

## 2024-07-27 RX ORDER — SODIUM CHLORIDE 9 MG/ML
INJECTION, SOLUTION INTRAVENOUS ONCE
Status: COMPLETED | OUTPATIENT
Start: 2024-07-27 | End: 2024-07-27

## 2024-07-27 RX ORDER — 0.9 % SODIUM CHLORIDE 0.9 %
500 INTRAVENOUS SOLUTION INTRAVENOUS ONCE
Status: COMPLETED | OUTPATIENT
Start: 2024-07-27 | End: 2024-07-27

## 2024-07-27 RX ORDER — ACETAMINOPHEN 80 MG
TABLET,CHEWABLE ORAL ONCE
Status: DISCONTINUED | OUTPATIENT
Start: 2024-07-27 | End: 2024-08-08 | Stop reason: HOSPADM

## 2024-07-27 RX ORDER — ACETAMINOPHEN 650 MG/1
650 SUPPOSITORY RECTAL EVERY 6 HOURS PRN
Status: DISCONTINUED | OUTPATIENT
Start: 2024-07-27 | End: 2024-08-08 | Stop reason: HOSPADM

## 2024-07-27 RX ORDER — SODIUM CHLORIDE 9 MG/ML
INJECTION, SOLUTION INTRAVENOUS CONTINUOUS
Status: ACTIVE | OUTPATIENT
Start: 2024-07-27 | End: 2024-07-27

## 2024-07-27 RX ORDER — NOREPINEPHRINE BITARTRATE 0.06 MG/ML
1-100 INJECTION, SOLUTION INTRAVENOUS CONTINUOUS
Status: DISCONTINUED | OUTPATIENT
Start: 2024-07-27 | End: 2024-07-27

## 2024-07-27 RX ORDER — MIDODRINE HYDROCHLORIDE 10 MG/1
20 TABLET ORAL ONCE
Status: COMPLETED | OUTPATIENT
Start: 2024-07-27 | End: 2024-07-27

## 2024-07-27 RX ORDER — NOREPINEPHRINE BITARTRATE 0.06 MG/ML
1-100 INJECTION, SOLUTION INTRAVENOUS CONTINUOUS
Status: DISCONTINUED | OUTPATIENT
Start: 2024-07-28 | End: 2024-07-28

## 2024-07-27 RX ORDER — MIDODRINE HYDROCHLORIDE 10 MG/1
20 TABLET ORAL
Status: DISCONTINUED | OUTPATIENT
Start: 2024-07-27 | End: 2024-08-08 | Stop reason: HOSPADM

## 2024-07-27 RX ORDER — BUDESONIDE AND FORMOTEROL FUMARATE DIHYDRATE 80; 4.5 UG/1; UG/1
2 AEROSOL RESPIRATORY (INHALATION)
Status: DISCONTINUED | OUTPATIENT
Start: 2024-07-27 | End: 2024-08-08 | Stop reason: HOSPADM

## 2024-07-27 RX ORDER — GLUCAGON 1 MG/ML
1 KIT INJECTION PRN
Status: DISCONTINUED | OUTPATIENT
Start: 2024-07-27 | End: 2024-08-08 | Stop reason: HOSPADM

## 2024-07-27 RX ORDER — SODIUM CHLORIDE 9 MG/ML
INJECTION, SOLUTION INTRAVENOUS PRN
Status: DISCONTINUED | OUTPATIENT
Start: 2024-07-27 | End: 2024-08-08 | Stop reason: HOSPADM

## 2024-07-27 RX ORDER — SODIUM CHLORIDE 9 MG/ML
INJECTION, SOLUTION INTRAVENOUS PRN
Status: COMPLETED | OUTPATIENT
Start: 2024-07-27 | End: 2024-07-27

## 2024-07-27 RX ORDER — ALBUMIN (HUMAN) 12.5 G/50ML
50 SOLUTION INTRAVENOUS ONCE
Status: COMPLETED | OUTPATIENT
Start: 2024-07-27 | End: 2024-07-27

## 2024-07-27 RX ORDER — HYDROCORTISONE 10 MG/1
5 TABLET ORAL NIGHTLY
Status: DISCONTINUED | OUTPATIENT
Start: 2024-07-27 | End: 2024-08-02

## 2024-07-27 RX ORDER — ONDANSETRON 2 MG/ML
4 INJECTION INTRAMUSCULAR; INTRAVENOUS ONCE
Status: COMPLETED | OUTPATIENT
Start: 2024-07-27 | End: 2024-07-27

## 2024-07-27 RX ORDER — DILTIAZEM HYDROCHLORIDE 5 MG/ML
10 INJECTION INTRAVENOUS ONCE
Status: DISCONTINUED | OUTPATIENT
Start: 2024-07-27 | End: 2024-07-27

## 2024-07-27 RX ORDER — INSULIN LISPRO 100 [IU]/ML
0-8 INJECTION, SOLUTION INTRAVENOUS; SUBCUTANEOUS
Status: DISCONTINUED | OUTPATIENT
Start: 2024-07-27 | End: 2024-07-30

## 2024-07-27 RX ORDER — INSULIN LISPRO 100 [IU]/ML
0-4 INJECTION, SOLUTION INTRAVENOUS; SUBCUTANEOUS NIGHTLY
Status: DISCONTINUED | OUTPATIENT
Start: 2024-07-27 | End: 2024-07-30

## 2024-07-27 RX ORDER — SODIUM CHLORIDE 0.9 % (FLUSH) 0.9 %
5-40 SYRINGE (ML) INJECTION PRN
Status: DISCONTINUED | OUTPATIENT
Start: 2024-07-27 | End: 2024-08-08 | Stop reason: HOSPADM

## 2024-07-27 RX ORDER — INSULIN GLARGINE 100 [IU]/ML
35 INJECTION, SOLUTION SUBCUTANEOUS NIGHTLY
Status: DISCONTINUED | OUTPATIENT
Start: 2024-07-27 | End: 2024-08-08 | Stop reason: HOSPADM

## 2024-07-27 RX ORDER — ACETAMINOPHEN 325 MG/1
650 TABLET ORAL EVERY 6 HOURS PRN
Status: DISCONTINUED | OUTPATIENT
Start: 2024-07-27 | End: 2024-08-08 | Stop reason: HOSPADM

## 2024-07-27 RX ORDER — SODIUM CHLORIDE 0.9 % (FLUSH) 0.9 %
5-40 SYRINGE (ML) INJECTION EVERY 12 HOURS SCHEDULED
Status: DISCONTINUED | OUTPATIENT
Start: 2024-07-27 | End: 2024-08-08 | Stop reason: HOSPADM

## 2024-07-27 RX ORDER — HYDROCORTISONE 10 MG/1
10 TABLET ORAL DAILY
Status: DISCONTINUED | OUTPATIENT
Start: 2024-07-27 | End: 2024-08-02

## 2024-07-27 RX ORDER — ACETAMINOPHEN 500 MG
1000 TABLET ORAL ONCE
Status: COMPLETED | OUTPATIENT
Start: 2024-07-27 | End: 2024-07-27

## 2024-07-27 RX ORDER — ONDANSETRON 2 MG/ML
4 INJECTION INTRAMUSCULAR; INTRAVENOUS EVERY 6 HOURS PRN
Status: DISCONTINUED | OUTPATIENT
Start: 2024-07-27 | End: 2024-08-08 | Stop reason: HOSPADM

## 2024-07-27 RX ORDER — POLYETHYLENE GLYCOL 3350 17 G/17G
17 POWDER, FOR SOLUTION ORAL DAILY PRN
Status: DISCONTINUED | OUTPATIENT
Start: 2024-07-27 | End: 2024-08-08 | Stop reason: HOSPADM

## 2024-07-27 RX ORDER — DEXTROSE MONOHYDRATE 100 MG/ML
INJECTION, SOLUTION INTRAVENOUS CONTINUOUS PRN
Status: DISCONTINUED | OUTPATIENT
Start: 2024-07-27 | End: 2024-08-08 | Stop reason: HOSPADM

## 2024-07-27 RX ADMIN — MIDODRINE HYDROCHLORIDE 20 MG: 10 TABLET ORAL at 10:54

## 2024-07-27 RX ADMIN — HYDROCORTISONE 5 MG: 10 TABLET ORAL at 20:11

## 2024-07-27 RX ADMIN — Medication 10 MCG/MIN: at 14:41

## 2024-07-27 RX ADMIN — ONDANSETRON 4 MG: 2 INJECTION INTRAMUSCULAR; INTRAVENOUS at 10:00

## 2024-07-27 RX ADMIN — SODIUM CHLORIDE: 9 INJECTION, SOLUTION INTRAVENOUS at 10:08

## 2024-07-27 RX ADMIN — SODIUM CHLORIDE 500 ML: 9 INJECTION, SOLUTION INTRAVENOUS at 09:50

## 2024-07-27 RX ADMIN — ACETAMINOPHEN 1000 MG: 500 TABLET ORAL at 12:55

## 2024-07-27 RX ADMIN — INSULIN LISPRO 4 UNITS: 100 INJECTION, SOLUTION INTRAVENOUS; SUBCUTANEOUS at 20:11

## 2024-07-27 RX ADMIN — MIDODRINE HYDROCHLORIDE 20 MG: 10 TABLET ORAL at 16:32

## 2024-07-27 RX ADMIN — INSULIN GLARGINE 35 UNITS: 100 INJECTION, SOLUTION SUBCUTANEOUS at 20:10

## 2024-07-27 RX ADMIN — SODIUM CHLORIDE: 9 INJECTION, SOLUTION INTRAVENOUS at 10:55

## 2024-07-27 RX ADMIN — ACETAMINOPHEN 325MG 650 MG: 325 TABLET ORAL at 20:11

## 2024-07-27 RX ADMIN — SODIUM CHLORIDE 500 ML: 0.9 INJECTION, SOLUTION INTRAVENOUS at 09:28

## 2024-07-27 RX ADMIN — HYDROCORTISONE 10 MG: 10 TABLET ORAL at 16:32

## 2024-07-27 RX ADMIN — Medication 10 ML: at 13:35

## 2024-07-27 RX ADMIN — ALBUMIN (HUMAN) 50 G: 0.25 INJECTION, SOLUTION INTRAVENOUS at 15:02

## 2024-07-27 ASSESSMENT — PAIN DESCRIPTION - ORIENTATION: ORIENTATION: LEFT

## 2024-07-27 ASSESSMENT — PAIN SCALES - GENERAL
PAINLEVEL_OUTOF10: 7
PAINLEVEL_OUTOF10: 0
PAINLEVEL_OUTOF10: 0
PAINLEVEL_OUTOF10: 8
PAINLEVEL_OUTOF10: 7

## 2024-07-27 ASSESSMENT — PAIN DESCRIPTION - DESCRIPTORS
DESCRIPTORS: ACHING
DESCRIPTORS: SORE

## 2024-07-27 ASSESSMENT — PAIN DESCRIPTION - LOCATION
LOCATION: ARM
LOCATION: ARM

## 2024-07-27 NOTE — CONSENT
Informed Consent for Blood Component Transfusion Note    I have discussed with the patient the rationale for blood component transfusion; its benefits in treating or preventing fatigue, organ damage, or death; and its risk which includes mild transfusion reactions, rare risk of blood borne infection, or more serious but rare reactions. I have discussed the alternatives to transfusion, including the risk and consequences of not receiving transfusion. The patient had an opportunity to ask questions and had agreed to proceed with transfusion of blood components.    Electronically signed by Melquiades French MD on 7/27/24 at 9:56 AM EDT

## 2024-07-27 NOTE — ED PROVIDER NOTES
[facility] ED  eMERGENCY dEPARTMENT eNCOUnter      Pt Name: Michelle Le  MRN: [id number]  Birthdate 1960  Date of evaluation: 7/27/2024  Provider: Melquiades French MD    CHIEF COMPLAINT       Chief Complaint   Patient presents with    Hypotension     Picked at HD site and started bleeding, hypotensive when EMS arrived         HISTORY OF PRESENT ILLNESS   (Location/Symptom, Timing/Onset,Context/Setting, Quality, Duration, Modifying Factors, Severity)  Note limiting factors.   Michelle Le is a 64 y.o. female who presents to the emergency department with complaint of hemorrhage.  Patient admits that she did take off her bandage off her fistula.  Patient admits that she pulled the bandage off a layer of skin seem to come off with that and the patient began bleeding briskly.  She took off her daughter who attended to her and noted there was blood squirting aggressively out of her fistula.    This kind patient arrives to the emergency department with a systolic blood pressure approximately 50, missing her morning dose of Midodrin, and with active control of bleeding site.    This kind patient arrives alert awake and oriented, fully aware of situation and hand.  Patient denies active chest pain, acute shortness of breath, but does admit to feeling some nausea and some dizziness.    HPI    NursingNotes were reviewed.    REVIEW OF SYSTEMS    (2-9 systems for level 4, 10 or more for level 5)     Review of Systems   Constitutional:  Positive for activity change. Negative for chills and fever.   HENT:  Negative for congestion, ear pain, rhinorrhea and trouble swallowing.    Eyes: Negative.    Respiratory:  Negative for shortness of breath and wheezing.    Cardiovascular:  Negative for chest pain and leg swelling.   Gastrointestinal:  Positive for nausea. Negative for abdominal pain and vomiting.   Endocrine: Negative.    Genitourinary:  Negative for dysuria, frequency and hematuria.   Musculoskeletal:   Negative for gait problem and neck pain.   Skin:  Positive for wound.   Allergic/Immunologic: Negative.    Neurological:  Positive for dizziness and weakness. Negative for seizures, syncope, speech difficulty and light-headedness.   Hematological: Negative.    Psychiatric/Behavioral:  Negative for hallucinations, self-injury and suicidal ideas.        Except as noted above the remainder of the review of systems was reviewed and negative.       PAST MEDICAL HISTORY     Past Medical History:   Diagnosis Date    Angina at rest ([facility]) 5/24/2020    Anxiety     CAD S/P percutaneous coronary angioplasty 2015, 2018    stents per Huong Sanabria    CHF (congestive heart failure) ([facility])     CKD (chronic kidney disease) stage 4, GFR 15-29 ml/min ([facility]) 2/24/2018    CKD stage 4 due to type 2 diabetes mellitus ([facility])     Contusion of right chest wall 2/16/2021    COPD (chronic obstructive pulmonary disease) ([facility])     Diabetic nephropathy with proteinuria ([facility]) 2014    DJD (degenerative joint disease) of knee     Dr Sharma    GERD (gastroesophageal reflux disease)     Hemiparesis, left ([facility]) 2013    entered Assisted Living ([place])    Hemodialysis patient ([facility])     Hemodialysis-associated hypotension 10/22/2021    History of heart failure     History of seizures     History of type C viral hepatitis     HTN (hypertension)     Hyperlipidemia     Impaired mobility and activities of daily living     Infection of venous access port 7/29/2022    Mediastinal lymphadenopathy 2013    Sangeeta Cummings    Metabolic syndrome     Moderate persistent asthma without complication 9/23/2020    Need for extended care facility 7/7/2021    Neurogenic urinary incontinence 2013    Neuropathy in diabetes ([facility])     Nonrheumatic mitral valve regurgitation 7/7/2021    Nonrheumatic tricuspid valve regurgitation 7/7/2021    Obesity (BMI 30-39.9)     Recurrent UTI     S/P colonoscopy 2014    [facility], focal active colitis    Schizophrenia,  meter to test blood sugar as needed    BLOOD GLUCOSE MONITORING SUPPL (ONETOUCH VERIO REFLECT) W/DEVICE KIT    Give 1 meter dx E11.65    BLOOD GLUCOSE MONITORING SUPPL (ONETOUCH VERIO) W/DEVICE KIT    AS DIRECTED    BLOOD GLUCOSE TEST STRIPS (ASCENSIA AUTODISC VI;ONE TOUCH ULTRA TEST VI) STRIP    1 each by In Vitro route daily As needed.    BLOOD GLUCOSE TEST STRIPS (FREESTYLE LITE) STRIP    1 each by Does not apply route 4 times daily (before meals and nightly) As needed.    BLOOD GLUCOSE TEST STRIPS (FREESTYLE LITE) STRIP    Use three time daily to test BS E11.65    BLOOD GLUCOSE TEST STRIPS (ONETOUCH VERIO) STRIP    QID    CONTINUOUS GLUCOSE SENSOR (DEXCOM G7 SENSOR) MISC    1 Device by Does not apply route every 10 days    DOCUSATE SODIUM (COLACE, DULCOLAX) 100 MG CAPS    Take 100 mg by mouth 2 times daily    FREESTYLE LANCETS MISC    Test 4x daily    HANDICAP PLACARD MISC    by Does not apply route Expiration in 5 years.    HYDROCORTISONE (CORTEF) 10 MG TABLET    Take 2 tablets by mouth 2 times daily    HYDROCORTISONE (CORTEF) 5 MG TABLET    Take 1 tablet by mouth at bedtime    HYDROXYZINE HCL (ATARAX) 25 MG TABLET    Take 1 tablet by mouth 2 times daily Indications: Allergy    INSULIN LISPRO, 1 UNIT DIAL, (HUMALOG KWIKPEN) 100 UNIT/ML SOPN    inject 3 times daily before meals- 120-140 2 units, 141-160 3 units, 161-180 4 units, 181-200 5 units, 201-220 6 units, 221-240 7 units, 241-260 8 units, 261-280 9 units, 281-300 10 units, 301 or higher 11 units.    INSULIN PEN NEEDLE 32G X 6 MM MISC    1 Device by Does not apply route 4 times daily (before meals and nightly)    LANTUS SOLOSTAR 100 UNIT/ML INJECTION PEN    INJECT 35 UNITS SUBCUTANEOUSLY AT BEDTIME    MELATONIN 3 MG TABS TABLET    Take 1 tablet by mouth nightly as needed Indications: Trouble Sleeping    MIDODRINE (PROAMATINE) 10 MG TABLET    Take 2 tablets by mouth 3 times daily (with meals)    MUPIROCIN (BACTROBAN) 2 % OINTMENT    Apply topically 2 times  10/11/2021 [facility] updates; patient lives with her daughter son-in-law and 2 grandchildren and patient's handicapped son.  Per daughter, her brother is blind, MRDD, CP multiple health issues.  Daughter's  is patient's legal guardian.    Patient has hemodialysis Tuesday Thursday and Saturday.  Patient's bedroom is on main floor with a half bath.  Daughter walks patient upstairs once weekly for full bath.  Patient is using her walker in the home.  Patient has a hospital bed in the home.     Social Determinants of Health     Financial Resource Strain: Low Risk  (7/29/2022)    Overall Financial Resource Strain (CARDIA)     Difficulty of Paying Living Expenses: Not hard at all   Food Insecurity: No Food Insecurity (4/19/2024)    Hunger Vital Sign     Worried About Running Out of Food in the Last Year: Never true     Ran Out of Food in the Last Year: Never true   Transportation Needs: No Transportation Needs (4/19/2024)    PRAPARE - Transportation     Lack of Transportation (Medical): No     Lack of Transportation (Non-Medical): No   Physical Activity: Sufficiently Active (5/13/2022)    Exercise Vital Sign     Days of Exercise per Week: 3 days     Minutes of Exercise per Session: 60 min   Housing Stability: Low Risk  (4/19/2024)    Housing Stability Vital Sign     Unable to Pay for Housing in the Last Year: No     Number of Places Lived in the Last Year: 1     Unstable Housing in the Last Year: No       SCREENINGS             PHYSICAL EXAM    (up to 7 for level 4, 8 or more for level 5)     ED Triage Vitals   BP Temp Temp Source Pulse Respirations SpO2 Height Weight - Scale   07/27/24 0915 07/27/24 0927 07/27/24 1000 07/27/24 0915 07/27/24 0915 07/27/24 0915 07/27/24 0927 07/27/24 0927   (!) 60/33 98 °F (36.7 °C) Oral 88 24 100 % 1.727 m (5' 8\") 89.4 kg (197 lb)       Physical Exam  Vitals and nursing note reviewed.   Constitutional:       Appearance: Normal appearance. She is well-developed.   HENT:      Head:

## 2024-07-27 NOTE — ED NOTES
Patient and her daughter updated that she will be going to ICU. Patient and daughter in agreement. Awaiting bed assignment.   Patient's daughter, Paulina, is going home but can be reached by phone at [phone].
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
## 2024-07-27 NOTE — ED NOTES
Patient presents to the ER via EMS with complaints blood loss from HD site and hypotension.  Per EMS, patient is a \"\" and picks at wounds and had significant blood loss at home.  Family \"made a tourniquet\" to stop the bleeding prior to EMS arrival.  Pts BP was 60/40 for EMS.  Pt arrives, hypotensive, bleeding from site has stopped, dried blood on bilateral arms.  Pt complains of being lightheaded.  A&Ox4.  Pt is a HD patient, T/Th/Sat and follows with Dr Finch.  Pt is supposed to go to dialysis today at 11am.  Pt states that her BP is normally low.  Denies SOB.  Denies CP. Resp even and unlabored at this time.  Pt speaking in full sentences, no distress noted.

## 2024-07-27 NOTE — H&P
Hospital Medicine  History and Physical    Patient:  Michelle Le  MRN: [id number]    CHIEF COMPLAINT:    Chief Complaint   Patient presents with    Hypotension     Picked at HD site and started bleeding, hypotensive when EMS arrived       History Obtained From:  patient  Primary Care Physician: Adilene Terry MD    HISTORY OF PRESENT ILLNESS:   The patient is a 64 y.o. female who presents with a hx of dm2, gerd, htn, hld, gerd, copd, who presents from home after pulling bandage off dialysis site and had significant bleeding from the site.  She was transported to [facility] and found to have a blood pressure 50s/30s.  She received fluid and improved, however bp remained very low.  She currently denies cp, sob, fever, chills, dizziness.    Past Medical History:      Diagnosis Date    Angina at rest ([facility]) 5/24/2020    Anxiety     CAD S/P percutaneous coronary angioplasty 2015, 2018    stents per Huong Sanabrai    CHF (congestive heart failure) ([facility])     CKD (chronic kidney disease) stage 4, GFR 15-29 ml/min ([facility]) 2/24/2018    CKD stage 4 due to type 2 diabetes mellitus ([facility])     Contusion of right chest wall 2/16/2021    COPD (chronic obstructive pulmonary disease) ([facility])     Diabetic nephropathy with proteinuria ([facility]) 2014    DJD (degenerative joint disease) of knee     Dr Sharma    GERD (gastroesophageal reflux disease)     Hemiparesis, left ([facility]) 2013    entered Assisted Living ([place])    Hemodialysis patient ([facility])     Hemodialysis-associated hypotension 10/22/2021    History of heart failure     History of seizures     History of type C viral hepatitis     HTN (hypertension)     Hyperlipidemia     Impaired mobility and activities of daily living     Infection of venous access port 7/29/2022    Mediastinal lymphadenopathy 2013    Sangeeta Cummings    Metabolic syndrome     Moderate persistent asthma without complication 9/23/2020    Need for extended care facility 7/7/2021    Neurogenic  Closed rib fracture S22.39XA    Depression F32.A    Chronic obstructive pulmonary disease ([facility]) J44.9    Critical illness polyneuropathy ([facility]) G62.81    Multiple closed fractures of ribs of right side S22.41XA    Nonrheumatic mitral valve regurgitation I34.0    Nonrheumatic tricuspid valve regurgitation I36.1    Need for extended care facility Z78.9    Chronic pain of both shoulders M25.511, G89.29, M25.512    Gastrointestinal hemorrhage with melena K92.1    Hemodialysis-associated hypotension I95.3    Anginal chest pain at rest ([facility]) I20.89    Chest pain R07.9    Unstable angina ([facility]) I20.0    NSTEMI (non-ST elevated myocardial infarction) ([facility]) I21.4    CKD (chronic kidney disease) stage 4, GFR 15-29 ml/min ([facility]) N18.4    Hyperkalemia E87.5    Impaired mobility and activities of daily living dt polyneuropathy and reccent fall  Z74.09, Z78.9    Dialysis patient ([facility]) Z99.2    Unspecified open wound of right upper arm, initial encounter S41.101A    Multiple closed fractures of right lower extremity and ribs S82.91XA, S22.41XA    Closed T11 fracture ([facility]) S22.089A    Encephalopathy acute G93.40    MRSA bacteremia R78.81, B95.62    Sepsis due to Enterococcus ([facility]) A41.81    Local infection due to central venous catheter T80.212A    DJD (degenerative joint disease), cervical M47.812    Closed head injury S09.90XA    Sarcopenia M62.84    Fall from standing W19.XXXA    Sepsis due to skin infection ([facility]) A41.9, L08.9    Chronic hepatitis C ([facility]) B18.2    Catheter-related bloodstream infection T80.211A    Enterococcus faecalis infection A49.8    Cervical stenosis of spinal canal M48.02    Ataxic gait R26.0    Lumbar stenosis with neurogenic claudication M48.062    Falls infrequently Z91.81    Infection of venous access port T80.219A    Diarrhea R19.7    Anemia, unspecified D64.9    Eczema L30.9    AMS (altered mental status) R41.82    Heart failure ([facility]) I50.9    Interstitial lung disease ([facility]) J84.9    Cellulitis of left

## 2024-07-27 NOTE — ACP (ADVANCE CARE PLANNING)
Advance Care Planning     Advance Care Planning Activator (Inpatient)  Conversation Note      Date of ACP Conversation: 7/27/2024     Conversation Conducted with: Patient with Decision Making Capacity    ACP Activator: Opal Small, RN        Health Care Decision Maker:     Current Designated Health Care Decision Maker:     Primary Decision Maker: Angelina Fagan - Child - [phone]

## 2024-07-27 NOTE — CARE COORDINATION
Case Management Assessment  Initial Evaluation    Date/Time of Evaluation: 7/27/2024 4:40 PM  Assessment Completed by: Opal Small RN    If patient is discharged prior to next notation, then this note serves as note for discharge by case management.    Patient Name: Michelle Le                   YOB: 1960  Diagnosis: Blood loss anemia [D50.0]  Chronic renal failure, unspecified CKD stage [N18.9]  Acute anemia [D64.9]  Hypotension due to hypovolemia [E86.1]                   Date / Time: 7/27/2024  9:22 AM    Patient Admission Status: Inpatient   Readmission Risk (Low < 19, Mod (19-27), High > 27): Readmission Risk Score: 28.7    Current PCP: Adilene Terry MD  PCP verified by CM? (P) Yes ([facility]    Chart Reviewed: Yes      History Provided by: Patient, Child/Family  Patient Orientation: Alert and Oriented, Person, Place, Situation, Self    Patient Cognition: Alert    Hospitalization in the last 30 days (Readmission):  No    If yes, Readmission Assessment in CM Navigator will be completed.    Advance Directives:      Code Status: Full Code   Patient's Primary Decision Maker is: (P) Named in Scanned ACP Document    Primary Decision Maker: Angelina Fagan - Child - [phone]    Discharge Planning:    Patient lives with: (P) Children Type of Home: (P) House  Primary Care Giver: Family  Patient Support Systems include: Children   Current Financial resources: (P) Medicare  Current community resources: (P) Other (Comment) ([facility], PT arranged through them comes twice a week.)  Current services prior to admission: (P) Other (Comment), Durable Medical Equipment (visiting , PT.)            Current DME: (P) Wheelchair, Other (Comment) (criss lift.)            Type of Home Care services:  None    ADLS  Prior functional level: (P) Assistance with the following:, Bathing, Dressing, Toileting, Cooking, Housework, Shopping, Mobility  Current functional level: (P) Mobility,  bedbound. She has a wheelchair and criss lift. She has a cpap machine. She is not on home O2 but receives O2 during her dialysis treatments.  She receives HD T/Th/Sat at [facility] with a chair time of 1230. If she would require rehab her daughter states that she would choose [facility]. She receives PT and Dr visits [facility].     The Plan for Transition of Care is related to the following treatment goals of Blood loss anemia [D50.0]  Chronic renal failure, unspecified CKD stage [N18.9]  Acute anemia [D64.9]  Hypotension due to hypovolemia [E86.1]    IF APPLICABLE: The Patient and/or patient representative Michelle and her family were provided with a choice of provider and agrees with the discharge plan. Freedom of choice list with basic dialogue that supports the patient's individualized plan of care/goals and shares the quality data associated with the providers was provided to:     Patient Representative Name:       The Patient and/or Patient Representative Agree with the Discharge Plan?      Opal Small RN  Case Management Department

## 2024-07-27 NOTE — ED NOTES
Call received from FLAQUITO Hughes with concern that patient is not appropriate for floor. She will call Nursing Supervisor. Pito, Charge RN aware.

## 2024-07-27 NOTE — ED NOTES
The following labs were labeled with appropriate pt sticker and tubed to lab:     [x] Blue     [x] Lavender   [] on ice  [x] Green/yellow  [] Green/black [] on ice  [] Grey  [] on ice  [] Yellow  [] Red  [] Pink  [x] Type/ Screen  [] ABG  [] VBG    [] COVID-19 swab    [] Rapid  [] PCR  [] Flu swab  [] Peds Viral Panel     [] Urine Sample  [] Fecal Sample  [] Pelvic Cultures  [] Blood Cultures  [] X 2  [] STREP Cultures  [] Wound Cultures

## 2024-07-28 LAB
ANION GAP SERPL CALCULATED.3IONS-SCNC: 16 MEQ/L (ref 9–15)
BASOPHILS # BLD: 0.1 K/UL (ref 0–0.2)
BASOPHILS NFR BLD: 0.5 %
BUN SERPL-MCNC: 31 MG/DL (ref 8–23)
CALCIUM SERPL-MCNC: 10 MG/DL (ref 8.5–9.9)
CHLORIDE SERPL-SCNC: 98 MEQ/L (ref 95–107)
CO2 SERPL-SCNC: 26 MEQ/L (ref 20–31)
CREAT SERPL-MCNC: 4.73 MG/DL (ref 0.5–0.9)
EOSINOPHIL # BLD: 0.5 K/UL (ref 0–0.7)
EOSINOPHIL NFR BLD: 3.6 %
ERYTHROCYTE [DISTWIDTH] IN BLOOD BY AUTOMATED COUNT: 18.7 % (ref 11.5–14.5)
GLUCOSE BLD-MCNC: 149 MG/DL (ref 70–99)
GLUCOSE BLD-MCNC: 182 MG/DL (ref 70–99)
GLUCOSE BLD-MCNC: 192 MG/DL (ref 70–99)
GLUCOSE BLD-MCNC: 275 MG/DL (ref 70–99)
GLUCOSE SERPL-MCNC: 277 MG/DL (ref 70–99)
HCT VFR BLD AUTO: 24 % (ref 37–47)
HCT VFR BLD AUTO: 24.6 % (ref 37–47)
HGB BLD-MCNC: 7.7 G/DL (ref 12–16)
LYMPHOCYTES # BLD: 2.2 K/UL (ref 1–4.8)
LYMPHOCYTES NFR BLD: 15.3 %
MCH RBC QN AUTO: 28 PG (ref 27–31.3)
MCHC RBC AUTO-ENTMCNC: 31.3 % (ref 33–37)
MCV RBC AUTO: 89.5 FL (ref 79.4–94.8)
MONOCYTES # BLD: 1.1 K/UL (ref 0.2–0.8)
MONOCYTES NFR BLD: 7.4 %
NEUTROPHILS # BLD: 10.5 K/UL (ref 1.4–6.5)
NEUTS SEG NFR BLD: 72.5 %
PERFORMED ON: ABNORMAL
PLATELET # BLD AUTO: 187 K/UL (ref 130–400)
POTASSIUM SERPL-SCNC: 3.8 MEQ/L (ref 3.4–4.9)
RBC # BLD AUTO: 2.75 M/UL (ref 4.2–5.4)
RETICS # AUTO: 0.13 M/CUMM (ref 0.02–0.11)
RETICS/RBC NFR: 4.7 % (ref 0.6–2.2)
SODIUM SERPL-SCNC: 140 MEQ/L (ref 135–144)
WBC # BLD AUTO: 14.5 K/UL (ref 4.8–10.8)

## 2024-07-28 PROCEDURE — 36415 COLL VENOUS BLD VENIPUNCTURE: CPT

## 2024-07-28 PROCEDURE — 85046 RETICYTE/HGB CONCENTRATE: CPT

## 2024-07-28 PROCEDURE — 2500000003 HC RX 250 WO HCPCS: Performed by: NURSE PRACTITIONER

## 2024-07-28 PROCEDURE — 2700000000 HC OXYGEN THERAPY PER DAY

## 2024-07-28 PROCEDURE — 6370000000 HC RX 637 (ALT 250 FOR IP): Performed by: FAMILY MEDICINE

## 2024-07-28 PROCEDURE — 2000000000 HC ICU R&B

## 2024-07-28 PROCEDURE — 85025 COMPLETE CBC W/AUTO DIFF WBC: CPT

## 2024-07-28 PROCEDURE — 80048 BASIC METABOLIC PNL TOTAL CA: CPT

## 2024-07-28 PROCEDURE — 92610 EVALUATE SWALLOWING FUNCTION: CPT

## 2024-07-28 PROCEDURE — 99291 CRITICAL CARE FIRST HOUR: CPT | Performed by: INTERNAL MEDICINE

## 2024-07-28 PROCEDURE — 2500000003 HC RX 250 WO HCPCS: Performed by: INTERNAL MEDICINE

## 2024-07-28 PROCEDURE — 6370000000 HC RX 637 (ALT 250 FOR IP): Performed by: INTERNAL MEDICINE

## 2024-07-28 PROCEDURE — 5A1D70Z PERFORMANCE OF URINARY FILTRATION, INTERMITTENT, LESS THAN 6 HOURS PER DAY: ICD-10-PCS | Performed by: INTERNAL MEDICINE

## 2024-07-28 RX ORDER — NOREPINEPHRINE BITARTRATE 0.06 MG/ML
1-100 INJECTION, SOLUTION INTRAVENOUS CONTINUOUS
Status: DISCONTINUED | OUTPATIENT
Start: 2024-07-28 | End: 2024-08-02

## 2024-07-28 RX ORDER — MECOBALAMIN 5000 MCG
5 TABLET,DISINTEGRATING ORAL NIGHTLY PRN
Status: DISCONTINUED | OUTPATIENT
Start: 2024-07-28 | End: 2024-08-08 | Stop reason: HOSPADM

## 2024-07-28 RX ADMIN — INSULIN LISPRO 4 UNITS: 100 INJECTION, SOLUTION INTRAVENOUS; SUBCUTANEOUS at 07:31

## 2024-07-28 RX ADMIN — Medication 3 MCG/MIN: at 11:36

## 2024-07-28 RX ADMIN — MIDODRINE HYDROCHLORIDE 20 MG: 10 TABLET ORAL at 07:28

## 2024-07-28 RX ADMIN — ACETAMINOPHEN 325MG 650 MG: 325 TABLET ORAL at 09:03

## 2024-07-28 RX ADMIN — Medication 7 MCG/MIN: at 00:08

## 2024-07-28 RX ADMIN — Medication 5 MG: at 21:28

## 2024-07-28 RX ADMIN — HYDROCORTISONE 10 MG: 10 TABLET ORAL at 07:28

## 2024-07-28 RX ADMIN — HYDROCORTISONE 5 MG: 10 TABLET ORAL at 21:13

## 2024-07-28 RX ADMIN — INSULIN GLARGINE 35 UNITS: 100 INJECTION, SOLUTION SUBCUTANEOUS at 21:13

## 2024-07-28 RX ADMIN — MIDODRINE HYDROCHLORIDE 20 MG: 10 TABLET ORAL at 16:20

## 2024-07-28 RX ADMIN — MICONAZOLE NITRATE: 2 POWDER TOPICAL at 21:13

## 2024-07-28 RX ADMIN — MICONAZOLE NITRATE: 2 POWDER TOPICAL at 07:29

## 2024-07-28 RX ADMIN — MIDODRINE HYDROCHLORIDE 20 MG: 10 TABLET ORAL at 11:35

## 2024-07-28 ASSESSMENT — PAIN SCALES - GENERAL: PAINLEVEL_OUTOF10: 3

## 2024-07-28 NOTE — FLOWSHEET NOTE
Patient needing to be redirected while eating to take smaller bites, swallow food before taking another bite, and to take sips between bites to moisten her mouth while eating dry foods such as waffles, muffins, and toast. She does follow commands but tends to eat too fast with large bites. She does not wear dentures. She has no teeth but eats a regular diet most of the time.

## 2024-07-28 NOTE — CONSULTS
Inpatient consult to Critical Care  Consult performed by: Gabriella Almazan MD  Consult ordered by: Donnie De La Vega MD            Admit Date: 7/27/2024    PCP:  Adilene Terry MD         CHIEF COMPLAINT / HPI:                The patient is a 64 y.o. female with significant past medical history of end-stage renal disease, coronary artery disease, CHF, known hypotension on midodrine who presented with hypotension from dialysis center the setting of bleeding from the hemodialysis access.  This is a recurrent issue.  She denies any fever or chills.  Blood pressure was really low in the ER with minimal initial mental status.  She was started on Levophed and transferred to the ICU.  He has been transfused blood.          Past Medical History:      Diagnosis Date    Angina at rest ([facility]) 5/24/2020    Anxiety     CAD S/P percutaneous coronary angioplasty 2015, 2018    stents per Huong Sanabria    CHF (congestive heart failure) ([facility])     CKD (chronic kidney disease) stage 4, GFR 15-29 ml/min ([facility]) 2/24/2018    CKD stage 4 due to type 2 diabetes mellitus ([facility])     Contusion of right chest wall 2/16/2021    COPD (chronic obstructive pulmonary disease) ([facility])     Diabetic nephropathy with proteinuria ([facility]) 2014    DJD (degenerative joint disease) of knee     Dr Sharma    GERD (gastroesophageal reflux disease)     Hemiparesis, left ([facility]) 2013    entered Assisted Living ([facility])    Hemodialysis patient ([facility])     Hemodialysis-associated hypotension 10/22/2021    History of heart failure     History of seizures     History of type C viral hepatitis     HTN (hypertension)     Hyperlipidemia     Impaired mobility and activities of daily living     Infection of venous access port 7/29/2022    Mediastinal lymphadenopathy 2013    Sangeeta Cummings    Metabolic syndrome     Moderate persistent asthma without complication 9/23/2020    Need for extended care facility 7/7/2021    Neurogenic urinary incontinence 2013       Musculoskeletal - no joint tenderness, deformity or swelling  Extremities - peripheral pulses normal, no pedal edema  Skin - normal coloration and turgor, no rashes         DATA:    CBC:   Recent Labs     07/27/24  0930 07/27/24  1521 07/28/24  0516   WBC 11.6*  --  14.5*   HGB 6.9* 9.2* 7.7*   HCT 22.2* 29.6* 24.6*     --  187     BMP:    Recent Labs     07/27/24  0945 07/28/24  0516    140   K 3.6 3.8    98   CO2 28 26   BUN 29* 31*   CREATININE 4.20* 4.73*   GLUCOSE 211* 277*   CALCIUM 9.4 10.0*     HEPATIC:   Recent Labs     07/27/24  0945   AST 11   ALT 7   BILITOT 0.5   ALKPHOS 101        INR:   Recent Labs     07/27/24  0930   INR 1.7          Radiology Review:    CXR portable: Results for orders placed during the hospital encounter of 04/16/24    XR CHEST PORTABLE    Narrative  EXAMINATION:  ONE XRAY VIEW OF THE CHEST    4/22/2024 10:27 am    COMPARISON:  Chest x-ray from April 20, 2024    HISTORY:  ORDERING SYSTEM PROVIDED HISTORY: intubation/OG tube placement  TECHNOLOGIST PROVIDED HISTORY:  Reason for exam:->intubation/OG tube placement  What reading provider will be dictating this exam?->CRC    FINDINGS:  No change in the arm right subclavian central line.    Status post intubation with the tip of the endotracheal tube approximately 3  cm from the tabitha.    There is an NG tube coursing to the stomach in good position.    The lungs and pleural spaces are without acute focal process.  Low  inspiratory volume study.    The cardiomediastinal silhouette is enlarged but unchanged.    There is no evidence of pneumothorax.    The osseous structures are without acute process.    Impression  No acute process.  Status post intubation, there is an NG tube in good  position.    Echo:    Assessment/Plan         Impression:     - Hypotension.  Multifactorial.  Admitted to the ICU for further care.  Requiring vasopressors.  -Acute on chronic anemia.  This is due to blood loss  - Acute blood loss  anemia due to #2.  -Mild coagulopathy.  -Leukocytosis.  Likely reactive.  -End-stage renal disease on hemodialysis.  - Hypotension on midodrine at home.     Recommendations:     - Admitted to the ICU for hemodynamic and airway monitoring.  - Transfuse for hemoglobin below 7.  - no AC  -Levophed for MAP <60, or SBP<90  - Strict intake and output measurement  -Sepsis workup  -Tight glucose CONTROL.          Full Code    Excluding procedures, the total critical care time caring for this patient with life threatening, unstable organ failure, including direct patient contact, review of medical record, management of life support systems, review of data including imaging and labs, discussions with other team members, patient's family and physicians at least 32 minutes so far today.     Electronically signed by Gabriella Almazan MD on 7/28/2024 at 10:26 AM

## 2024-07-28 NOTE — PROGRESS NOTES
1900: Bedside report obtained from Sandee HUDDLESTON, after which this RN assumed care of pt.     2000-2030: Shift assessments completed and documented, see flowsheets. A&OX4, follows commands. Pt on continuous titratable levophed. Medications administered per MAR. Bed alarm on. Call light within reach. No further needs verbalized by pt at this time.    [id number]: Pt removed own IV, no complications at site. Pt does not recall removing IV. This RN educated pt on the importance of IV access and the levophed drip r/t BP 60/20 and needing drip. Pt verbalized understanding. Levophed drip switched to remaining IV site.

## 2024-07-28 NOTE — PROGRESS NOTES
Hospitalist Daily Progress Note  Name: Michelle Le  Age: 64 y.o.  Gender: female  CodeStatus: Full Code  Allergies: Codeine  Oxycontin [Oxycodone Hcl]    Chief Complaint:Hypotension (Picked at HD site and started bleeding, hypotensive when EMS arrived)      Primary Care Provider: Adilene Terry MD    InpatientTreatment Team: Treatment Team: Attending Provider: Donnie De La Vega MD; Consulting Physician: Vinh Castellano MD; Consulting Physician: Gabriella Almazan MD; Registered Nurse: Lory Purdy RN; : Ronna Anaya RN; Utilization Reviewer: Della Dutton RN    Admission Date: 7/27/2024      Subjective: No chest pain, sob, nausea.  Resting in bed, sleeping.    Physical Exam  Vitals and nursing note reviewed.   Constitutional:       Appearance: Normal appearance.   Cardiovascular:      Rate and Rhythm: Normal rate and regular rhythm.   Pulmonary:      Effort: Pulmonary effort is normal.      Breath sounds: Normal breath sounds.   Abdominal:      General: Bowel sounds are normal.      Palpations: Abdomen is soft.   Musculoskeletal:         General: Normal range of motion.   Skin:     General: Skin is warm and dry.   Neurological:      General: No focal deficit present.      Mental Status: She is alert. Mental status is at baseline.         Medications:  Reviewed    Infusion Medications:    sodium chloride      dextrose      norepinephrine 3 mcg/min (07/28/24 0853)     Scheduled Medications:    miconazole   Topical BID    sodium chloride flush  5-40 mL IntraVENous 2 times per day    insulin glargine  35 Units SubCUTAneous Nightly    insulin lispro  0-8 Units SubCUTAneous TID WC    insulin lispro  0-4 Units SubCUTAneous Nightly    hydrocortisone  5 mg Oral Nightly    hydrocortisone  10 mg Oral Daily    midodrine  20 mg Oral TID     budesonide-formoterol  2 puff Inhalation BID RT    And    tiotropium  2 puff Inhalation Daily RT    pill splitter   Does not apply Once     PRN Meds: sodium  no displaced fractures, significant pulmonary contusion, evidence of great vessel injury, significant hematoma, pneumothorax, pleural or pericardial effusion.    Mild subpleural interstitial lung disease of the mid to lower lung fields has mildly progressed from 12/16/2014. Mild mediastinal and hilar lymphadenopathy is probably reactive.      ABDOMEN AND PELVIS CT FINDINGS:    There is no evidence of solid organ injury, displaced fractures, or significant hematoma.    Borderline wall thickening and/or fluid is noted around an otherwise unremarkable.    The liver, spleen, pancreas, adrenal glands, kidneys, great vessels, unopacified bowel loops, urinary bladder, and additional images of pelvis are unremarkable.      THORACIC SPINE CT FINDINGS:    An old mild to moderate compression fractures of T11 and mild degenerative changes of the thoracic spine are present.    There are no acute fractures, dislocation or acute paraspinous soft tissue abnormalities identified.      LUMBAR SPINE CT FINDINGS:    There is no fracture, dislocation, or acute paraspinous soft tissue abnormalities identified.    Mild degenerative changes are noted.    Impression  FINAL IMPRESSION:    NO ACUTE FRACTURES OR POSTTRAUMATIC COMPLICATION IDENTIFIED.    BORDERLINE WALL THICKENING AND/OR FLUID AROUND AN OTHERWISE UNREMARKABLE.    CHRONIC FINDINGS, AS NOTED.       WITHOUT CONTRAST: Results for orders placed during the hospital encounter of 03/05/24    CT CHEST WO CONTRAST    Narrative  EXAMINATION:  CT OF THE CHEST WITHOUT CONTRAST 3/8/2024 1:16 pm    TECHNIQUE:  CT of the chest was performed without the administration of intravenous  contrast. Multiplanar reformatted images are provided for review. Automated  exposure control, iterative reconstruction, and/or weight based adjustment of  the mA/kV was utilized to reduce the radiation dose to as low as reasonably  achievable.    COMPARISON:  March 5th    HISTORY:  ORDERING SYSTEM PROVIDED  HISTORY: Follow-up, air in  right atrium,  TECHNOLOGIST PROVIDED HISTORY:  Reason for exam:->Follow-up, air in  right atrium,  What reading provider will be dictating this exam?->CRC    FINDINGS:  Mediastinum: There is resolution of the previously seen air in the right  atrium.  Advanced cardiomegaly.  No lymphadenopathy.  Median thoracotomy  change.    Lungs/pleura: Small bilateral pleural effusions are seen.  There is slight  lower lung zone interlobular septal thickening.  There is no alveolar  consolidation.    Upper Abdomen: Slight nodularity of the contour of the liver.  There is mild  perihepatic and perisplenic ascitic fluid.    Soft Tissues/Bones: There is mild compression deformity of an upper lumbar  vertebral body.    Impression  1. Resolution of the previously seen air in the right atrium.  2. Advanced cardiomegaly with small bilateral pleural effusions and slight  lower lung zone interlobular septal thickening.  3. Slight nodularity of the contour of the liver with mild perihepatic and  perisplenic ascitic fluid.  4. Mild compression deformity of an upper lumbar vertebral body.      CXR      2-view: Results for orders placed during the hospital encounter of 06/30/22    XR CHEST (2 VW)    Narrative  EXAMINATION:  CHEST.    CLINICAL HISTORY:  CONFUSION.    COMPARISONS:  6/2/2022.    TECHNIQUE:  AP and lateral.    FINDINGS: There is mild to moderate cardiomegaly. No definite evidence of pulmonary venous congestion. Chronic changes are noted in the left lower thorax. There is a dialysis catheter in place. There is a valve prosthesis in place. There are small  pleural effusions or blunting of both costophrenic angles.    Impression  NO SIGNIFICANT CHANGE SINCE THE PREVIOUS EXAM. THERE HAS BEEN PLACEMENT OF A DIALYSIS CATHETER SINCE THAT EXAM.      Results for orders placed during the hospital encounter of 04/20/22    XR CHEST (2 VW)    Narrative  EXAMINATION: XR CHEST (2 VW)    CLINICAL HISTORY: ABDOMINAL

## 2024-07-28 NOTE — CONSULTS
[facility] Nephrology  Consult Note           Reason for Consult: End-stage renal disease on maintenance hemodialysis Monday Wednesday Friday  Requesting Physician:  Donnie Lofton MD      Chief Complaint: Bleeding AV fistula  History Obtained From:  patient, electronic medical record    History of Present Ilness:    64 y.o. year old female who presented to the emergency department for further evaluation and management of sudden onset of AV fistula bleed associated with hypotension with a blood pressure in the 50s admitted to critical care bed for further management though it did take place as per patient it seems like it did take place after pulling the bandage of the dialysis site  This clinical presentation did take place in a patient with past medical history of diabetes hypertension GERD COPD and she is on dialysis Monday Wednesday Friday anemia of chronic kidney disease secondary hyperparathyroidism and hyperphosphatemia  Past Medical History:        Diagnosis Date    Angina at rest ([facility]) 5/24/2020    Anxiety     CAD S/P percutaneous coronary angioplasty 2015, 2018    stents per Huong Sanabria    CHF (congestive heart failure) ([facility])     CKD (chronic kidney disease) stage 4, GFR 15-29 ml/min ([facility]) 2/24/2018    CKD stage 4 due to type 2 diabetes mellitus ([facility])     Contusion of right chest wall 2/16/2021    COPD (chronic obstructive pulmonary disease) ([facility])     Diabetic nephropathy with proteinuria ([facility]) 2014    DJD (degenerative joint disease) of knee     Dr Sharma    GERD (gastroesophageal reflux disease)     Hemiparesis, left ([facility]) 2013    entered Assisted Living ([facility])    Hemodialysis patient ([facility])     Hemodialysis-associated hypotension 10/22/2021    History of heart failure     History of seizures     History of type C viral hepatitis     HTN (hypertension)     Hyperlipidemia     Impaired mobility and activities of daily living     Infection of venous access port 7/29/2022     PADS 1 Device by Does not apply route 4 times daily (before meals and nightly) 200 each 1    Insulin Pen Needle 32G X 6 MM MISC 1 Device by Does not apply route 4 times daily (before meals and nightly) 150 each 3    blood glucose monitor strips 1 strip by Other route 4 times daily (before meals and nightly) Pt test 4x daily Dx E11.65.  May substitute for generic or insurance covered product 150 strip 3    blood glucose monitor kit and supplies 1 kit by Other route 4 times daily (before meals and nightly) Pt test 4x daily Dx E11.65.  May substitute for generic or insurance covered product 1 kit 0    midodrine (PROAMATINE) 10 MG tablet Take 2 tablets by mouth 3 times daily (with meals) 180 tablet 3    amoxicillin (AMOXIL) 500 MG capsule TAKE ONE (1) CAPSULE BY MOUTH TWICE DAILY 60 capsule 1    mupirocin (BACTROBAN) 2 % ointment Apply topically 2 times daily Apply to AA topically 2 times daily.      pantoprazole (PROTONIX) 20 MG tablet Take 1 tablet by mouth daily      vitamin D (VITAMIN D3) 50 MCG (2000 UT) CAPS capsule Take 1 capsule by mouth daily      hydrocortisone (CORTEF) 5 MG tablet Take 1 tablet by mouth at bedtime      docusate sodium (COLACE, DULCOLAX) 100 MG CAPS Take 100 mg by mouth 2 times daily      hydrocortisone (CORTEF) 10 MG tablet Take 2 tablets by mouth 2 times daily (Patient taking differently: Take 1 tablet by mouth daily) 20 tablet 0    apixaban (ELIQUIS) 2.5 MG TABS tablet Take 1 tablet by mouth 2 times daily (Patient taking differently: Take 1 tablet by mouth 2 times daily Indications: Preventative Treatment) 180 tablet 3    TRELEGY ELLIPTA 100-62.5-25 MCG/ACT AEPB inhaler Inhale 1 puff into the lungs daily 1 each 0    blood glucose test strips (FREESTYLE LITE) strip Use three time daily to test BS E11.65 100 each 3    blood glucose test strips (ASCENSIA AUTODISC VI;ONE TOUCH ULTRA TEST VI) strip 1 each by In Vitro route daily As needed. 100 each 3    Blood Glucose Monitoring Suppl (ONETOUCH  AM     BMP:    Lab Results   Component Value Date/Time     07/28/2024 05:16 AM    K 3.8 07/28/2024 05:16 AM    CL 98 07/28/2024 05:16 AM    CO2 26 07/28/2024 05:16 AM    BUN 31 07/28/2024 05:16 AM    CREATININE 4.73 07/28/2024 05:16 AM    CALCIUM 10.0 07/28/2024 05:16 AM    GFRAA 17.5 10/06/2022 11:15 PM    LABGLOM 9.6 07/28/2024 05:16 AM    LABGLOM 9.1 04/30/2024 05:19 AM    GLUCOSE 277 07/28/2024 05:16 AM    GLUCOSE 90 10/24/2022 10:43 AM     IR US GUIDED PARACENTESIS    Result Date: 7/9/2024  1.  Status post technically successful ultrasound-guided paracentesis.  CLINICAL HISTORY:VELASQUEZ GARBER is a Female of 64 years age, referred for Ultrasound Guided Paracentesis.  PROCEDURE: Survey of the abdomen showed large amount of ascites fluid. After obtaining informed consent, the patient was positioned supine on the sonography table. Using ultrasound, the skin over the left hemiabdomen was locally anesthetized with 1% lidocaine.  Following that, a Yueh needle was advanced into the fluid pocket using ultrasound visualization. 7600 cc, of clear yellow fluid were aspirated and sent for cytology, and pathology.  The needle was removed, and hemostasis was obtained with pressure.  A Band-Aid was placed. Post procedure images did not demonstrate hemorrhage at the target site. The patient tolerated the procedure well. The patient left the department in good condition. A radiology nurse was in presence monitoring vital signs, assisting throughout the procedure. Electronically signed by Eze White    CT ABDOMEN PELVIS WO CONTRAST Additional Contrast? None    Result Date: 7/1/2024  EXAMINATION: CT OF THE ABDOMEN AND PELVIS WITHOUT CONTRAST 7/1/2024 12:40 am TECHNIQUE: CT of the abdomen and pelvis was performed without the administration of intravenous contrast. Multiplanar reformatted images are provided for review. Automated exposure control, iterative reconstruction, and/or weight based adjustment of the mA/kV was  evidence of active bleeding patient did improve her hemoglobin after packed RBCs transfusion      Plan/  1-we will follow patient renal replacement therapy hemodialysis will be ordered for tomorrow      Thank you for the consultation.  Please do not hesitate to call with questions.    Vinh Castellano MD

## 2024-07-28 NOTE — PROGRESS NOTES
[facility]   Facility/Department: [facility] ICU  Speech Language Pathology  Clinical Bedside Swallow Evaluation    NAME:Michelle Le  : 1960 (64 y.o.)   [x]   confirmed    MRN: [id number]  ROOM: [place] [address]  ADMISSION DATE: 2024  PATIENT DIAGNOSIS(ES): Blood loss anemia [D50.0]  Chronic renal failure, unspecified CKD stage [N18.9]  Acute anemia [D64.9]  Hypotension due to hypovolemia [E86.1]  Chief Complaint   Patient presents with    Hypotension     Picked at HD site and started bleeding, hypotensive when EMS arrived     Patient Active Problem List    Diagnosis Date Noted    Unstable angina (HCC) 2022    Chest pain     Infection of venous access port 2022    Lumbar stenosis with neurogenic claudication 07/15/2022    Falls infrequently 07/15/2022    Cervical stenosis of spinal canal     Ataxic gait     Chronic hepatitis C (HCC) 07/10/2022    Catheter-related bloodstream infection     Enterococcus faecalis infection     Sepsis due to skin infection (HCC) 2022    Sarcopenia 2022    Fall from standing     DJD (degenerative joint disease), cervical 2022    Closed head injury     MRSA bacteremia 2022    Sepsis due to Enterococcus (HCC)     Local infection due to central venous catheter     Impaired mobility and activities of daily living dt polyneuropathy and reccent fall  2022    Closed T11 fracture (HCC) 2022    Encephalopathy acute 2022    Unspecified open wound of right upper arm, initial encounter 2022    Hyperkalemia 2022    CKD (chronic kidney disease) stage 4, GFR 15-29 ml/min (HCC) 2022    Acute anemia 2024    Thrombocytopenia (HCC) 2024    Other ascites 2024    Cirrhosis of liver with ascites (HCC) 2024    Open wound of left hand without foreign body 2024    Adrenal insufficiency (HCC) 2024    Hypogammaglobulinemia (HCC) 2024    Lack of intravenous access 2024    Pressure  2  Consistency Presented: Regular  How Presented: Self-fed/presented  How much presented: 1 (cracker)  Bolus Acceptance: No impairment  Bolus Formation/Control: Impaired  Type of Impairment: Mastication  Propulsion: No impairment  Oral Residue: Less than 10% of bolus;Lingual  Initiation of Swallow: No impairment  Aspiration Signs/Symptoms: None  Pharyngeal Phase Characteristics: No impairment, issues, or problems      PO Trials 3  Consistency Presented: Thin  How Presented: Self-fed/presented  How much presented: 3 (ounces)  Bolus Acceptance: No impairment  Bolus Formation/Control: No impairment  Propulsion: No impairment  Oral Residue: None  Initiation of Swallow: No impairment  Aspiration Signs/Symptoms: Throat clear  Effective Modifications: Small sips and bites  Cues for Modifications: Minimal    Dysphagia Diagnosis  Dysphagia Diagnosis: Mild oral stage dysphagia;Mild pharyngeal stage dysphagia  Oral Phase - Comment: Patient presents with mild oral dysphagia characterized by decreased labial seal, incoordinates labial movements, and impaired mastication due to edentulous status.  Pharyngeal Phase Comment: Patient presents with suspected mild pharyngeal dysphagia characterized by immediate throat clears following consecutive sips of thin liquids. No overt s/s of aspiration were noted with single sips.  Dysphagia Outcome Severity Scale: Level 5: Mild dysphagia- Distant supervision. May need one diet consistency restricted     Recommendations  Requires SLP Intervention: Yes  Therapeutic Interventions: Diet tolerance monitoring;Oral motor exercises;Pharyngeal exercises  Duration of Treatment: 1-2 weeks for LOS or until goals met  Frequency of Treatment: 1-3x's/week    Prognosis  Speech Therapy Prognosis  Prognosis: Good  Prognosis Considerations: Previous Level of Function    Education  Patient Education: Patient educated on results of BSE, recommended strategies, and plan of care  Patient Education Response:  Verbalizes understanding  Individuals consulted  Consulted and agree with results and recommendations: Patient;RN  RN Name: Ester    Treatment/Goals  Short-term Goals  Timeframe for Short-term Goals: 1-2 weeks  Goal 1: Patient will tolerate soft and bite size and thin liquid diet with adequate mastication, oral clearance, and no s/s of aspiration in 10/10 trials.  Goal 2: Patient will implement recommended strategies (e.g. upright positioning, small bites/sips, alternating solids/liquids) while consuming meal in 10/10 trials.  Goal 3: Patient will participate in therapeutic trials of regular solids with adequate mastication and oral clearance in 10/10 trials.  Goal 4: Pt will complete tongue press, tongue pullback, and/or effortful swallow exercise with 80% accuracy, given cues as needed, to decrease residue on the base of tongue.  Goal 5: Pt will complete pharyngeal exercises (e.g. chin tuck against resistance, Shaker head lift, falsetto & effortful pitch glide) with 80% accuracy, given cues as needed, to increase pharyngeal function.  Long-term Goals  Timeframe for Long-term Goals: 1-2 weeks for LOS or until goals met  Goal 1: Patient will tolerate least restrictive diet with adequate mastication, oral clearance, and no s/s of aspiration in 10/10 trials.    Safety Devices  Safety Devices  Safety Devices in place: Yes  Type of devices: Bed alarm in place;Call light within reach  Restraints Initially in Place: No    Pain Assessment  Patient does not c/o pain.    Pain Re-assessment  Patient does not c/o pain.    Dysphagia Outcomes Severity Scale  Dysphagia Outcome Severity Scale: Level 5: Mild dysphagia- Distant supervision. May need one diet consistency restricted    Therapy Time  SLP Individual Minutes  Time In: 0953  Time Out: 1008  Minutes: 15          Signature: Electronically signed by ANN MARIE Lynn on 7/28/2024 at 10:28 AM

## 2024-07-29 ENCOUNTER — APPOINTMENT (OUTPATIENT)
Dept: CT IMAGING | Age: 64
End: 2024-07-29
Payer: MEDICARE

## 2024-07-29 ENCOUNTER — PREP FOR PROCEDURE (OUTPATIENT)
Dept: SURGERY | Age: 64
End: 2024-07-29

## 2024-07-29 LAB
ALBUMIN SERPL-MCNC: 3.3 G/DL (ref 3.5–4.6)
ANION GAP SERPL CALCULATED.3IONS-SCNC: 13 MEQ/L (ref 9–15)
BASOPHILS # BLD: 0.1 K/UL (ref 0–0.2)
BASOPHILS NFR BLD: 0.5 %
BUN SERPL-MCNC: 34 MG/DL (ref 8–23)
CALCIUM SERPL-MCNC: 9.9 MG/DL (ref 8.5–9.9)
CHLORIDE SERPL-SCNC: 95 MEQ/L (ref 95–107)
CO2 SERPL-SCNC: 26 MEQ/L (ref 20–31)
CREAT SERPL-MCNC: 5.25 MG/DL (ref 0.5–0.9)
EKG ATRIAL RATE: 264 BPM
EKG Q-T INTERVAL: 440 MS
EKG QRS DURATION: 138 MS
EKG QTC CALCULATION (BAZETT): 504 MS
EKG R AXIS: -66 DEGREES
EKG T AXIS: 114 DEGREES
EKG VENTRICULAR RATE: 79 BPM
EOSINOPHIL # BLD: 0.5 K/UL (ref 0–0.7)
EOSINOPHIL NFR BLD: 4.4 %
ERYTHROCYTE [DISTWIDTH] IN BLOOD BY AUTOMATED COUNT: 18.6 % (ref 11.5–14.5)
GLUCOSE BLD-MCNC: 148 MG/DL (ref 70–99)
GLUCOSE BLD-MCNC: 252 MG/DL (ref 70–99)
GLUCOSE SERPL-MCNC: 130 MG/DL (ref 70–99)
HCT VFR BLD AUTO: 23.8 % (ref 37–47)
HGB BLD-MCNC: 7.7 G/DL (ref 12–16)
LYMPHOCYTES # BLD: 1.9 K/UL (ref 1–4.8)
LYMPHOCYTES NFR BLD: 15.6 %
MCH RBC QN AUTO: 28.7 PG (ref 27–31.3)
MCHC RBC AUTO-ENTMCNC: 32.4 % (ref 33–37)
MCV RBC AUTO: 88.8 FL (ref 79.4–94.8)
MONOCYTES # BLD: 1 K/UL (ref 0.2–0.8)
MONOCYTES NFR BLD: 8.2 %
NEUTROPHILS # BLD: 8.6 K/UL (ref 1.4–6.5)
NEUTS SEG NFR BLD: 70.6 %
PERFORMED ON: ABNORMAL
PERFORMED ON: ABNORMAL
PHOSPHATE SERPL-MCNC: 4.1 MG/DL (ref 2.3–4.8)
PLATELET # BLD AUTO: 172 K/UL (ref 130–400)
POTASSIUM SERPL-SCNC: 4.1 MEQ/L (ref 3.4–4.9)
RBC # BLD AUTO: 2.68 M/UL (ref 4.2–5.4)
SODIUM SERPL-SCNC: 134 MEQ/L (ref 135–144)
WBC # BLD AUTO: 12.2 K/UL (ref 4.8–10.8)

## 2024-07-29 PROCEDURE — 2500000003 HC RX 250 WO HCPCS: Performed by: NURSE PRACTITIONER

## 2024-07-29 PROCEDURE — 99291 CRITICAL CARE FIRST HOUR: CPT | Performed by: INTERNAL MEDICINE

## 2024-07-29 PROCEDURE — 2000000000 HC ICU R&B

## 2024-07-29 PROCEDURE — 92526 ORAL FUNCTION THERAPY: CPT

## 2024-07-29 PROCEDURE — 6370000000 HC RX 637 (ALT 250 FOR IP): Performed by: NURSE PRACTITIONER

## 2024-07-29 PROCEDURE — 36415 COLL VENOUS BLD VENIPUNCTURE: CPT

## 2024-07-29 PROCEDURE — 6370000000 HC RX 637 (ALT 250 FOR IP): Performed by: INTERNAL MEDICINE

## 2024-07-29 PROCEDURE — 80069 RENAL FUNCTION PANEL: CPT

## 2024-07-29 PROCEDURE — 2580000003 HC RX 258: Performed by: FAMILY MEDICINE

## 2024-07-29 PROCEDURE — 85025 COMPLETE CBC W/AUTO DIFF WBC: CPT

## 2024-07-29 PROCEDURE — 74176 CT ABD & PELVIS W/O CONTRAST: CPT

## 2024-07-29 PROCEDURE — 6370000000 HC RX 637 (ALT 250 FOR IP): Performed by: FAMILY MEDICINE

## 2024-07-29 PROCEDURE — 99213 OFFICE O/P EST LOW 20 MIN: CPT

## 2024-07-29 RX ORDER — SODIUM CHLORIDE 0.9 % (FLUSH) 0.9 %
5-40 SYRINGE (ML) INJECTION PRN
Status: CANCELLED | OUTPATIENT
Start: 2024-07-30

## 2024-07-29 RX ORDER — ATORVASTATIN CALCIUM 40 MG/1
40 TABLET, FILM COATED ORAL DAILY
COMMUNITY

## 2024-07-29 RX ORDER — DOCUSATE SODIUM 100 MG/1
100 CAPSULE, LIQUID FILLED ORAL 2 TIMES DAILY
Status: DISCONTINUED | OUTPATIENT
Start: 2024-07-29 | End: 2024-07-31

## 2024-07-29 RX ORDER — ARIPIPRAZOLE 2 MG/1
5 TABLET ORAL DAILY
Status: DISCONTINUED | OUTPATIENT
Start: 2024-07-29 | End: 2024-08-08 | Stop reason: HOSPADM

## 2024-07-29 RX ORDER — SODIUM CHLORIDE 9 MG/ML
INJECTION, SOLUTION INTRAVENOUS PRN
Status: CANCELLED | OUTPATIENT
Start: 2024-07-30

## 2024-07-29 RX ORDER — SODIUM CHLORIDE 0.9 % (FLUSH) 0.9 %
5-40 SYRINGE (ML) INJECTION EVERY 12 HOURS SCHEDULED
Status: CANCELLED | OUTPATIENT
Start: 2024-07-30

## 2024-07-29 RX ORDER — ALBUTEROL SULFATE 0.83 MG/ML
2.5 SOLUTION RESPIRATORY (INHALATION) EVERY 4 HOURS PRN
Status: DISCONTINUED | OUTPATIENT
Start: 2024-07-29 | End: 2024-08-08 | Stop reason: HOSPADM

## 2024-07-29 RX ORDER — NEPHROCAP 1 MG
1 CAP ORAL DAILY
Status: DISCONTINUED | OUTPATIENT
Start: 2024-07-29 | End: 2024-08-08 | Stop reason: HOSPADM

## 2024-07-29 RX ORDER — ARIPIPRAZOLE 5 MG/1
5 TABLET ORAL DAILY
COMMUNITY

## 2024-07-29 RX ORDER — ARIPIPRAZOLE 10 MG/1
5 TABLET ORAL DAILY
Status: ON HOLD | COMMUNITY
End: 2024-07-29

## 2024-07-29 RX ORDER — AMOXICILLIN 500 MG/1
500 CAPSULE ORAL EVERY 12 HOURS SCHEDULED
Status: DISCONTINUED | OUTPATIENT
Start: 2024-07-29 | End: 2024-08-02

## 2024-07-29 RX ORDER — FLUDROCORTISONE ACETATE 0.1 MG/1
0.1 TABLET ORAL 2 TIMES DAILY
Status: DISCONTINUED | OUTPATIENT
Start: 2024-07-29 | End: 2024-08-08 | Stop reason: HOSPADM

## 2024-07-29 RX ORDER — PANTOPRAZOLE SODIUM 20 MG/1
20 TABLET, DELAYED RELEASE ORAL DAILY
Status: DISCONTINUED | OUTPATIENT
Start: 2024-07-29 | End: 2024-07-31

## 2024-07-29 RX ORDER — SERTRALINE HYDROCHLORIDE 100 MG/1
100 TABLET, FILM COATED ORAL DAILY
Status: ON HOLD | COMMUNITY
End: 2024-07-29

## 2024-07-29 RX ORDER — VITAMIN B COMPLEX
2000 TABLET ORAL DAILY
Status: DISCONTINUED | OUTPATIENT
Start: 2024-07-29 | End: 2024-08-08 | Stop reason: HOSPADM

## 2024-07-29 RX ORDER — FLUDROCORTISONE ACETATE 0.1 MG/1
0.1 TABLET ORAL 2 TIMES DAILY
COMMUNITY

## 2024-07-29 RX ADMIN — HYDROCORTISONE 5 MG: 10 TABLET ORAL at 20:59

## 2024-07-29 RX ADMIN — MICONAZOLE NITRATE: 2 POWDER TOPICAL at 08:31

## 2024-07-29 RX ADMIN — MIDODRINE HYDROCHLORIDE 20 MG: 10 TABLET ORAL at 07:55

## 2024-07-29 RX ADMIN — MIDODRINE HYDROCHLORIDE 20 MG: 10 TABLET ORAL at 11:31

## 2024-07-29 RX ADMIN — SERTRALINE HYDROCHLORIDE 50 MG: 50 TABLET ORAL at 11:31

## 2024-07-29 RX ADMIN — HYDROCORTISONE 10 MG: 10 TABLET ORAL at 07:54

## 2024-07-29 RX ADMIN — DOCUSATE SODIUM 100 MG: 100 CAPSULE, LIQUID FILLED ORAL at 20:59

## 2024-07-29 RX ADMIN — MICONAZOLE NITRATE: 2 POWDER TOPICAL at 21:02

## 2024-07-29 RX ADMIN — AMOXICILLIN 500 MG: 500 CAPSULE ORAL at 20:59

## 2024-07-29 RX ADMIN — DOCUSATE SODIUM 100 MG: 100 CAPSULE, LIQUID FILLED ORAL at 10:50

## 2024-07-29 RX ADMIN — FLUDROCORTISONE ACETATE 0.1 MG: 0.1 TABLET ORAL at 20:59

## 2024-07-29 RX ADMIN — PANTOPRAZOLE SODIUM 20 MG: 20 TABLET, DELAYED RELEASE ORAL at 10:50

## 2024-07-29 RX ADMIN — INSULIN GLARGINE 35 UNITS: 100 INJECTION, SOLUTION SUBCUTANEOUS at 20:59

## 2024-07-29 RX ADMIN — FLUDROCORTISONE ACETATE 0.1 MG: 0.1 TABLET ORAL at 11:31

## 2024-07-29 RX ADMIN — MIDODRINE HYDROCHLORIDE 20 MG: 10 TABLET ORAL at 17:11

## 2024-07-29 RX ADMIN — Medication 5 MG: at 20:58

## 2024-07-29 RX ADMIN — ARIPIPRAZOLE 5 MG: 2 TABLET ORAL at 11:31

## 2024-07-29 RX ADMIN — Medication 10 MCG/MIN: at 18:15

## 2024-07-29 RX ADMIN — Medication 10 ML: at 10:54

## 2024-07-29 RX ADMIN — Medication 2000 UNITS: at 10:50

## 2024-07-29 RX ADMIN — NEPHROCAP 1 MG: 1 CAP ORAL at 11:31

## 2024-07-29 RX ADMIN — AMOXICILLIN 500 MG: 500 CAPSULE ORAL at 10:50

## 2024-07-29 ASSESSMENT — PAIN SCALES - GENERAL
PAINLEVEL_OUTOF10: 0
PAINLEVEL_OUTOF10: 0
PAINLEVEL_OUTOF10: 2

## 2024-07-29 NOTE — CARE COORDINATION
Quality round completed with care management team. Pt is currently on 1 mcg of LEVO per report.   NO family at bedside.   Discussed DC plan with pt. Pt would like to return home with daughter and [facility].     LSW discussed Hospice and comfort care with pt. Pt refuse hospice and comfort care at this time. Would like to continue current treatment.     [facility] @ [phone].   LSW spoke with staff at [facility]. Per staff, they are [facility] medical group. Pt it active with them. But they are a PCP group go to patient's house to visit them instead of pt going to see them in office.     [facility] group would like to be notified on DC and AVS fax to [phone].     LSW went and double check everything with pt. Pt report that is the only services she have at home. Daughter helps out.     LSW will follow.     Electronically signed by HIPOLITO Rendon on 7/29/2024 at 11:06 AM

## 2024-07-29 NOTE — PROGRESS NOTES
Hospitalist Progress Note      PCP: Adilene Terry MD    Date of Admission: 7/27/2024    Chief Complaint:  no acute events, afebrile, is hypotensive requiring Norepinephrine support,     Medications:  Reviewed    Infusion Medications    norepinephrine 4 mcg/min (07/28/24 1814)    sodium chloride      dextrose       Scheduled Medications    miconazole   Topical BID    epoetin chadwick-epbx  8,000 Units SubCUTAneous Once per day on Mon Wed Fri    sodium chloride flush  5-40 mL IntraVENous 2 times per day    insulin glargine  35 Units SubCUTAneous Nightly    insulin lispro  0-8 Units SubCUTAneous TID WC    insulin lispro  0-4 Units SubCUTAneous Nightly    hydrocortisone  5 mg Oral Nightly    hydrocortisone  10 mg Oral Daily    midodrine  20 mg Oral TID WC    budesonide-formoterol  2 puff Inhalation BID RT    And    tiotropium  2 puff Inhalation Daily RT    pill splitter   Does not apply Once     PRN Meds: melatonin, sodium chloride flush, sodium chloride, ondansetron **OR** ondansetron, polyethylene glycol, acetaminophen **OR** acetaminophen, glucose, dextrose bolus **OR** dextrose bolus, glucagon (rDNA), dextrose      Intake/Output Summary (Last 24 hours) at 7/29/2024 0717  Last data filed at 7/29/2024 0611  Gross per 24 hour   Intake 552.8 ml   Output --   Net 552.8 ml       Exam:    BP (!) 97/22   Pulse 78   Temp 98.1 °F (36.7 °C) (Oral)   Resp 16   Ht 1.727 m (5' 8\")   Wt 89.4 kg (197 lb)   LMP  (LMP Unknown)   SpO2 96%   BMI 29.95 kg/m²     General appearance: awake, cooperative.  Respiratory:  clear to auscultation bilaterally  Cardiovascular: Regular rate and rhythm, S1/S2  Abdomen: Soft, active bowel sounds.  Musculoskeletal: edema bilaterally.       Labs:   Recent Labs     07/27/24  0930 07/27/24  1521 07/28/24  0516 07/28/24  1635 07/29/24  0525   WBC 11.6*  --  14.5*  --  12.2*   HGB 6.9* 9.2* 7.7*  --  7.7*   HCT 22.2* 29.6* 24.6* 24.0* 23.8*     --  187  --  172     Recent Labs      07/27/24  0945 07/28/24  0516 07/29/24  0525    140 134*   K 3.6 3.8 4.1    98 95   CO2 28 26 26   BUN 29* 31* 34*   CREATININE 4.20* 4.73* 5.25*   CALCIUM 9.4 10.0* 9.9   PHOS  --   --  4.1     Recent Labs     07/27/24  0945   AST 11   ALT 7   BILITOT 0.5   ALKPHOS 101     Recent Labs     07/27/24  0930   INR 1.7     No results for input(s): \"CKTOTAL\", \"TROPONINI\" in the last 72 hours.    Urinalysis:      Lab Results   Component Value Date/Time    NITRU Negative 10/06/2022 11:15 PM    WBCUA >100 10/06/2022 11:15 PM    BACTERIA MANY 10/06/2022 11:15 PM    RBCUA 10-20 10/06/2022 11:15 PM    BLOODU SMALL 10/06/2022 11:15 PM    SPECGRAV 1.030 09/17/2021 12:49 PM    GLUCOSEU 250 10/06/2022 11:15 PM       Radiology:  No orders to display           Assessment/Plan:    65 y/o female with history of CAD s/p CABG/PCI, s/p TV repair, PAF/AFL, CVA with left sided weakness, IDDM2, ESRD on HD, anemia, schizophrenia, COVID-19 pneumonia, obesity, T11 fracture, hypogammaglobulinemia, E.Faecalis BSI in 7/2022, chronic left knee PJI/OM on chronic suppressive therapy with Amoxicillin, recently managed at [facility] from 3/29-4/12 for cardiac arrest s/p ROSC, acute / chronic hypotension due to septic shock and adrenal insufficiency and 4/16-5/1 for acute blood loss anemia due GI bleed in the setting of severely elevated INR , shock,  abdominal distention requiring large volume paracentesis, who presented with :      Acute blood loss anemia  - due to bleeding at   - Hb improved s/p transfusion of PRBCs  - on Retacrit  - monitor H/H closely    Acute / chronic hypotension  - in the setting of blood loss, adrenal insufficiency  - on Norepinephrine infusion, Midodrine, Hydrocortisone  - followed by critical care service     ESRD on IHD  - followed by nephrology     PAF/AFL  - holding Apixaban due to bleeding  - monitor on telemetry     Hx of CAD s/p CABG/PCI  - resume ASA when able     IDDM with hyperglycemia  - on ISS,

## 2024-07-29 NOTE — PROGRESS NOTES
1900: Bedside report obtained from Sandee HUDDLESTON, after which this RN assumed care of pt.     2000-2130: Shift assessments completed and documented, see flowsheets. A&OX4, denies pain. Pt on continuous titratable levophed. Medications administered per MAR. Bed alarm on. Call light within reach. No further needs verbalized by pt at this time.    0600: Pt had uneventful shift. Pt continues to be on levophed, see MAR for titrations and medications administered throughout shift.

## 2024-07-29 NOTE — PROGRESS NOTES
[facility]  Facility/Department: [facility]  Speech Language Pathology   Treatment Note      Michelle Le  1960  [id number]/IC16-01  [x]   confirmed      Date: 2024    Blood loss anemia [D50.0]  Chronic renal failure, unspecified CKD stage [N18.9]  Acute anemia [D64.9]  Hypotension due to hypovolemia [E86.1]         ADULT DIET; Dysphagia - Soft and Bite Sized; No Concentrated sweets     Respiratory Status:O2 Flow Rate (L/min): 2 L/min (24)   No active isolations      Subjective:  Alert and Cooperative        Interventions used this date:  Dysphagia Treatment      Objective/Assessment:  Patient progressing towards goals:  Short-term Goals  Timeframe for Short-term Goals: 1-2 weeks  Goal 1: Patient will tolerate soft and bite size and thin liquid diet with adequate mastication, oral clearance, and no s/s of aspiration in 10/10 trials.  Pt reports tolerance of soft and bite sized diet  Goal 2: Patient will implement recommended strategies (e.g. upright positioning, small bites/sips, alternating solids/liquids) while consuming meal in 10/10 trials.  Goal 3: Patient will participate in therapeutic trials of regular solids with adequate mastication and oral clearance in 10/10 trials.  Goal 4: Pt will complete tongue press, tongue pullback, and/or effortful swallow exercise with 80% accuracy, given cues as needed, to decrease residue on the base of tongue.  Pt completed lingual press x10 with visual cues from SLP for tongue placement  Pt completed effortful swallow x6 with drinks after 2 repetitions  Goal 5: Pt will complete pharyngeal exercises (e.g. chin tuck against resistance, Shaker head lift, falsetto & effortful pitch glide) with 80% accuracy, given cues as needed, to increase pharyngeal function.  Pt attempted falsetto exercise with moderate SLP modeling. Pt had strained vocal quality and exercise was stopped    Treatment/Activity Tolerance:  Patient tolerated treatment

## 2024-07-29 NOTE — INTERDISCIPLINARY ROUNDS
Spiritual Care Services     Summary of Visit:  Attended interdisciplinary rounds. Pt is awake, on room air. Pt has an AD in the EMR, her dtr Paulina is her POA. Pt is Rastafarian.          Encounter Summary  Encounter Overview/Reason: Interdisciplinary rounds  Service Provided For: Patient  Referral/Consult From: Rounding  Support System: Children       Spiritual Care Services   Electronically signed by LUKAS Rosenthal on 7/29/2024 at 10:43 AM.    To reach a  for emotional and spiritual support, place an EPIC consult request.   If a  is needed immediately, dial “0” and ask to page the on-call .

## 2024-07-29 NOTE — PROGRESS NOTES
Wound Ostomy Continence Nurse  Consult Note       NAME:  Michelle Le  MEDICAL RECORD NUMBER:  [id number]  AGE: 64 y.o.   GENDER: female  : 1960  TODAY'S DATE:  2024    Subjective   Reason for [facility] Nurse Evaluation and Assessment: Scabs and open areas to bilateral upper extremities.       Michelle Le is a 64 y.o. female referred by:   [x] Physician  [] Nursing  [] Other:     Wound Identification:  Wound Type: traumatic  Contributing Factors: decreased tissue oxygenation, CKD,DM    Wound History: Patient admitted to [facility] from home with bleeding to HD catheter from picking at site. Patient noted to have multiple scabs to BUE.   Current Wound Care Treatment:  Recommending 1) continue all pressure injury prevention interventions. 2) for skin tears to lower left forearm with bloody drainage. Cover with xeroform guaze and secure with roll guaze. For all other dry scabbed areas to left upper arm and bilateral hands cover with foam dressings.     Patient Goal of Care:  [x] Wound Healing  [] Odor Control  [] Palliative Care  [] Pain Control   [] Other:         PAST MEDICAL HISTORY        Diagnosis Date    Angina at rest ([facility]) 2020    Anxiety     CAD S/P percutaneous coronary angioplasty ,     stents per Huong Sanabria    CHF (congestive heart failure) ([facility])     CKD (chronic kidney disease) stage 4, GFR 15-29 ml/min ([facility]) 2018    CKD stage 4 due to type 2 diabetes mellitus ([facility])     Contusion of right chest wall 2021    COPD (chronic obstructive pulmonary disease) ([facility])     Diabetic nephropathy with proteinuria ([facility])     DJD (degenerative joint disease) of knee     Dr Sharma    GERD (gastroesophageal reflux disease)     Hemiparesis, left ([facility])     entered Assisted Living ([place])    Hemodialysis patient ([facility])     Hemodialysis-associated hypotension 10/22/2021    History of heart failure     History of seizures     History of  joint disease), cervical    Closed head injury    Sarcopenia    Fall from standing    Sepsis due to skin infection (HCC)    Chronic hepatitis C (HCC)    Catheter-related bloodstream infection    Enterococcus faecalis infection    Cervical stenosis of spinal canal    Ataxic gait    Lumbar stenosis with neurogenic claudication    Falls infrequently    Infection of venous access port    Diarrhea    Anemia, unspecified    Eczema    AMS (altered mental status)    Heart failure (HCC)    Interstitial lung disease (HCC)    Cellulitis of left hand    Chronic osteomyelitis of knee (HCC)    Generalized weakness    Shock (HCC)    Fluid overload    Encounter for hospice care discussion    Advanced care planning/counseling discussion    Goals of care, counseling/discussion    Palliative care encounter    Nonhealing nonsurgical wound    Skin picking habit    Hypotension    ESRD on dialysis (HCC)    Acute decompensated heart failure (HCC)    Constipation    Chronic antibiotic suppression    Chronic infection of prosthetic knee (HCC)    Lack of intravenous access    Pressure injury, unstageable, with suspected deep tissue injury (HCC)    Hypogammaglobulinemia (HCC)    Adrenal insufficiency (HCC)    Open wound of left hand without foreign body    Other ascites    Cirrhosis of liver with ascites (HCC)    Thrombocytopenia (HCC)    Acute anemia       Measurements:  Wound 10/25/23 Arm Left;Lower;Posterior #1 left forearm (Active)   Number of days: 277       Wound 10/25/23 Arm Lower;Posterior;Right #2 right forearm (Active)   Number of days: 277       Wound 12/28/23 Hand Left;Posterior (Active)   Number of days: 213       Wound 04/03/24 Coccyx (Active)   Number of days: 117       Wound 04/09/24 Hand Right (Active)   Number of days: 110         Assessment: Patient awake, resting in bed. Bilateral upper extremities with frail ecchymotic skin. Former dressings removed to left arm and right hand. Noted three skin tears to left lower arm,

## 2024-07-29 NOTE — PROGRESS NOTES
PHARMACY NOTE:   ICU Rounds Attended (10-15 minutes in patient room):    Pt diagnosis: anemia    IV Fluids: none   Renal:   Recent Labs     07/27/24  0945 07/28/24  0516 07/29/24  0525   CREATININE 4.20* 4.73* 5.25*    Estimated Creatinine Clearance: 12 mL/min (A) (based on SCr of 5.25 mg/dL (H)).        Antimicrobial Therapy:    Antimicrobial agents: chronic amoxicillin     Recent Labs     07/27/24  0930 07/28/24  0516 07/29/24  0525   WBC 11.6* 14.5* 12.2*         Pressors:   norepinephrine @ 4 mcg/min    Insulin Therapy (goal: 140-180): medium SSI  4 units given past 24 hours   Lantus 35 units   Recent Labs     07/27/24  0945 07/28/24  0516 07/29/24  0525   GLUCOSE 211* 277* 130*     Steroid Therapy: hydrocortisone, fludrocortisone added per below    Stress Ulcer Prophylaxis:   Pantoprazole     DVT Prophylaxis/Anticoagulant Therapy: none  Recent Labs     07/27/24  0930 07/28/24  0516 07/29/24  0525    187 172     Recent Labs     07/27/24  0930   INR 1.7       Bowel Regimen:   Colace BID  Miralax daily    Follow up/Changes:   -resume home aripiprazole and sertraline per verbal on rounds  -home meds reviewed follow up resume as appropriate; eliquis, atorvastatin, hydroxyzine  -resume home fludrocortisone per verbal on rounds, continue cortef  -monitor qtc       Leti Freeman [facility] PharmD

## 2024-07-29 NOTE — PROGRESS NOTES
Spiritual Health Assessment/Progress Note  [facility]    Initial Encounter,  ,  ,      Name: Michelle Le MRN: [id number]    Age: 64 y.o.     Sex: female   Language: English   Mosque: Catholic   Acute anemia     Pt experiencing a loss of autonomy as she is bedbound, and concerned about becoming a burden to her dtr. Pt is Catholic, and draws upon her pat to cope. Pt has an AD, listing her dtr Paulina. Pt hopes to regain a minimum of functional ability.       Date: 7/29/2024            Total Time Calculated: 20 min              Spiritual Assessment [place] in [facility]        Referral/Consult From: Rounding   Encounter Overview/Reason: Initial Encounter  Service Provided For: Patient    Pat, Belief, Meaning:   Patient is connected with a pat tradition or spiritual practice  Family/Friends No family/friends present      Importance and Influence:  Patient has spiritual/personal beliefs that influence decisions regarding their health  Family/Friends no family/friends present    Community:  Patient has been unable to attend Mandaeism for some time. Private prayer is of comfort to her.       Assessment and Plan of Care:     Patient Interventions include:   Family/Friends Interventions include:     Patient Plan of Care:   Family/Friends Plan of Care:     Electronically signed by Bibiana Parham  on 7/29/2024 at 12:10 PM

## 2024-07-29 NOTE — PROGRESS NOTES
Pulmonary & Critical Care Medicine ICU Progress Note  Chief complaint : Hypotension     Subjunctive/24 hour events :   Patient seen and examined during multidisciplinary rounds with RN, charge nurse, RT, pharmacy, dietitian, and social service.   Patient had an uneventful night. She is on room air and O2 sats are 100%. Remains on levophed at 2 mcg/min. She is sitting up eating breakfast. No fever overnight and she is anuric. No bm noted.       Social History     Tobacco Use    Smoking status: Never     Passive exposure: Never    Smokeless tobacco: Never   Substance Use Topics    Alcohol use: No     Alcohol/week: 0.0 standard drinks of alcohol         Problem Relation Age of Onset    Cancer Mother 68        survived    Breast Cancer Mother     Hypertension Father     Diabetes Sister     Mental Illness Sister     Colon Cancer Neg Hx        No results for input(s): \"PHART\", \"QKS3NEU\", \"PO2ART\" in the last 72 hours.    MV Settings:     / / /            IV:   norepinephrine 4 mcg/min (07/28/24 1814)    sodium chloride      dextrose         Vitals:  BP 99/70   Pulse 84   Temp 98.1 °F (36.7 °C) (Oral)   Resp 19   Ht 1.727 m (5' 8\")   Wt 89.4 kg (197 lb)   LMP  (LMP Unknown)   SpO2 100%   BMI 29.95 kg/m²    Tmax:       Intake/Output Summary (Last 24 hours) at 7/29/2024 0846  Last data filed at 7/29/2024 0611  Gross per 24 hour   Intake 534.65 ml   Output --   Net 534.65 ml       EXAM:    General: alert, cooperative  Head: normocephalic, atraumatic  Eyes:No gross abnormalities.  ENT:  MMM no lesions  Neck:  supple and no masses  Chest : Fair air movement no rales no wheezes   Heart:: Heart sounds are normal.  Regular rate and rhythm without murmur, gallop or rub.  ABD:  bowel sounds normal, soft, non-tender  Musculoskeletal : no cyanosis, no clubbing, and no edema  Neuro:  Grossly normal  Skin: No rashes or nodules noted.  Lymph node:  no cervical nodes  Urology: No Reynolds   Psychiatric:    Monitor urine output and avoid overload              Electronically signed by LORETO Ang - CNP,  FCCP ,on 7/29/2024 at 8:46 AM

## 2024-07-29 NOTE — PLAN OF CARE
Problem: Discharge Planning  Goal: Discharge to home or other facility with appropriate resources  Outcome: Progressing  Flowsheets (Taken 7/28/2024 2000)  Discharge to home or other facility with appropriate resources: Identify barriers to discharge with patient and caregiver     Problem: Skin/Tissue Integrity  Goal: Absence of new skin breakdown  Description: 1.  Monitor for areas of redness and/or skin breakdown  2.  Assess vascular access sites hourly  3.  Every 4-6 hours minimum:  Change oxygen saturation probe site  4.  Every 4-6 hours:  If on nasal continuous positive airway pressure, respiratory therapy assess nares and determine need for appliance change or resting period.  Outcome: Progressing     Problem: Safety - Adult  Goal: Free from fall injury  Outcome: Progressing     Problem: ABCDS Injury Assessment  Goal: Absence of physical injury  Outcome: Progressing     Problem: Pain  Goal: Verbalizes/displays adequate comfort level or baseline comfort level  Outcome: Progressing

## 2024-07-29 NOTE — PROGRESS NOTES
Nephrology Progress Note    Assessment:  64 y.o. year old female who presented to the emergency department for further evaluation and management of sudden onset of AV fistula bleed associated with hypotension with a blood pressure in the 50s admitted to critical care bed for further management though it did take place as per patient it seems like it did take place after pulling the bandage of the dialysis site  This clinical presentation did take place in a patient with past medical history of diabetes hypertension GERD COPD and she is on dialysis Monday Wednesday Friday anemia of chronic kidney disease secondary hyperparathyroidism and hyperphosphatemia     Patient still hypotensive with a blood pressure in the 99/38 no significant tachycardia no clinical or laboratory indication for dialysis  Careful examination of the patient AV fistula is functional left upper arm with felt thrill and heard bruit no evidence of active bleeding patient did improve her hemoglobin after packed RBCs transfusion        Plan:  - hold on IHD till tomorrow due to low BP  - will see how BP is following addition of florinef        Patient Active Problem List:     Coronary artery disease involving native coronary artery of native heart with angina pectoris (HCC)     Schizophrenia, paranoid, chronic     Metabolic syndrome     Vitamin B 12 deficiency     Cerebral microvascular disease     Mixed hyperlipidemia     Other hammer toe (acquired)     Vitamin D insufficiency     Incontinence of urine     Diabetic nephropathy with proteinuria (HCC)     Essential (primary) hypertension     History of type C viral hepatitis     Urinary incontinence due to cognitive impairment     History of seizures     Stented coronary artery-plan is to stay on Plavix indefinately per Dr Walker     Diabetes mellitus ([facility])     Controlled type 2 diabetes mellitus with diabetic neuropathy, with long-term current use of insulin ([facility])     Hemiparesis, left (HCC)      Angina, class II ([facility])     Pain, unspecified     Tardive dyskinesia     Shortness of breath     Poorly controlled diabetes mellitus ([facility])     Chronic diastolic congestive heart failure ([facility])     [place] (obstructive sleep apnea)     Pulmonary hypertension, unspecified ([facility])     Class 2 severe obesity with serious comorbidity and body mass index (BMI) of 36.0 to 36.9 in adult ([facility])     Edema     Closed supracondylar fracture of right humerus     Other chronic pain     Palliative care patient     Recurrent falls     Renal failure     Difficulty in walking     ESRD (end stage renal disease) on dialysis ([facility])     Weakness     Other seizures ([facility])     Moderate persistent asthma without complication     COVID-19     Post PTCA     Falls frequently     Chest wall contusion, left, initial encounter     Headache, unspecified     Paranoid schizophrenia ([facility])     Compression fracture of spine ([facility])     Closed rib fracture     Depression     Chronic obstructive pulmonary disease ([facility])     Critical illness polyneuropathy ([facility])     Multiple closed fractures of ribs of right side     Nonrheumatic mitral valve regurgitation     Nonrheumatic tricuspid valve regurgitation     Need for extended care facility     Chronic pain of both shoulders     Gastrointestinal hemorrhage with melena     Hemodialysis-associated hypotension     Anginal chest pain at rest ([facility])     Chest pain     Unstable angina ([facility])     NSTEMI (non-ST elevated myocardial infarction) ([facility])     CKD (chronic kidney disease) stage 4, GFR 15-29 ml/min ([facility])     Hyperkalemia     Impaired mobility and activities of daily living dt polyneuropathy and reccent fall      Dialysis patient ([facility])     Unspecified open wound of right upper arm, initial encounter     Multiple closed fractures of right lower extremity and ribs     Closed T11 fracture ([facility])     Encephalopathy acute     MRSA bacteremia     Sepsis due to Enterococcus ([facility])     Local infection due to central venous catheter      DJD (degenerative joint disease), cervical     Closed head injury     Sarcopenia     Fall from standing     Sepsis due to skin infection (HCC)     Chronic hepatitis C (HCC)     Catheter-related bloodstream infection     Enterococcus faecalis infection     Cervical stenosis of spinal canal     Ataxic gait     Lumbar stenosis with neurogenic claudication     Falls infrequently     Infection of venous access port     Diarrhea     Anemia, unspecified     Eczema     AMS (altered mental status)     Heart failure (HCC)     Interstitial lung disease (HCC)     Cellulitis of left hand     Chronic osteomyelitis of knee (HCC)     Generalized weakness     Shock (HCC)     Fluid overload     Encounter for hospice care discussion     Advanced care planning/counseling discussion     Goals of care, counseling/discussion     Palliative care encounter     Nonhealing nonsurgical wound     Skin picking habit     Hypotension     ESRD on dialysis (HCC)     Acute decompensated heart failure (HCC)     Constipation     Chronic antibiotic suppression     Chronic infection of prosthetic knee (HCC)     Lack of intravenous access     Pressure injury, unstageable, with suspected deep tissue injury (HCC)     Hypogammaglobulinemia (HCC)     Adrenal insufficiency (HCC)     Open wound of left hand without foreign body     Other ascites     Cirrhosis of liver with ascites (HCC)     Thrombocytopenia (HCC)     Acute anemia      Subjective:  Admit Date: 7/27/2024    Interval History: remains on pressors, pt feeling ok    Medications:  Scheduled Meds:   amoxicillin  500 mg Oral 2 times per day    Virt-Caps  1 capsule Oral Daily    docusate sodium  100 mg Oral BID    pantoprazole  20 mg Oral Daily    Vitamin D  2,000 Units Oral Daily    ARIPiprazole  5 mg Oral Daily    fludrocortisone  0.1 mg Oral BID    sertraline  50 mg Oral Daily    miconazole   Topical BID    epoetin chadwick-epbx  8,000 Units SubCUTAneous Once per day on Mon Wed Fri    sodium chloride

## 2024-07-29 NOTE — PROGRESS NOTES
I was asked to see this patient regarding a Kenyon catheter placement for assistance with IV access and blood draws    Patient is in agreement.  She is scheduled for 730 tomorrow morning for Kenyon catheter placement.  N.p.o. after midnight.  Permission obtained

## 2024-07-30 ENCOUNTER — APPOINTMENT (OUTPATIENT)
Dept: GENERAL RADIOLOGY | Age: 64
End: 2024-07-30
Payer: MEDICARE

## 2024-07-30 ENCOUNTER — ANESTHESIA (OUTPATIENT)
Dept: OPERATING ROOM | Age: 64
End: 2024-07-30
Payer: MEDICARE

## 2024-07-30 ENCOUNTER — ANESTHESIA EVENT (OUTPATIENT)
Dept: OPERATING ROOM | Age: 64
End: 2024-07-30
Payer: MEDICARE

## 2024-07-30 LAB
ALBUMIN FLUID: 2 G/DL
ALBUMIN SERPL-MCNC: 3.4 G/DL (ref 3.5–4.6)
AMYLASE FLUID: 13 U/L
ANION GAP SERPL CALCULATED.3IONS-SCNC: 17 MEQ/L (ref 9–15)
APPEARANCE FLUID: CLEAR
BASOPHILS # BLD: 0.1 K/UL (ref 0–0.2)
BASOPHILS NFR BLD: 0.5 %
BUN SERPL-MCNC: 40 MG/DL (ref 8–23)
CALCIUM SERPL-MCNC: 10 MG/DL (ref 8.5–9.9)
CELL COUNT FLUID TYPE: NORMAL
CHLORIDE SERPL-SCNC: 91 MEQ/L (ref 95–107)
CLOT EVALUATION: NORMAL
CO2 SERPL-SCNC: 23 MEQ/L (ref 20–31)
COLOR FLUID: YELLOW
CREAT SERPL-MCNC: 6.18 MG/DL (ref 0.5–0.9)
EOSINOPHIL # BLD: 0.4 K/UL (ref 0–0.7)
EOSINOPHIL NFR BLD: 2.8 %
ERYTHROCYTE [DISTWIDTH] IN BLOOD BY AUTOMATED COUNT: 19.2 % (ref 11.5–14.5)
FERRITIN: 1897 NG/ML (ref 13–150)
FLUID PATH CONSULT: YES
FLUID TYPE: NORMAL
FOLATE: >20 NG/ML (ref 4.8–24.2)
GLUCOSE BLD-MCNC: 190 MG/DL (ref 70–99)
GLUCOSE BLD-MCNC: 195 MG/DL (ref 70–99)
GLUCOSE BLD-MCNC: 233 MG/DL (ref 70–99)
GLUCOSE BLD-MCNC: 269 MG/DL (ref 70–99)
GLUCOSE FLD-MCNC: 209 MG/DL
GLUCOSE SERPL-MCNC: 215 MG/DL (ref 70–99)
HCT VFR BLD AUTO: 23 % (ref 37–47)
HGB BLD-MCNC: 7.3 G/DL (ref 12–16)
IRON % SATURATION: 51 % (ref 20–55)
IRON: 68 UG/DL (ref 37–145)
LACTATE DEHYDROGENASE, FLUID: 80 U/L
LYMPHOCYTES # BLD: 2.2 K/UL (ref 1–4.8)
LYMPHOCYTES NFR BLD: 16.5 %
LYMPHOCYTES, BODY FLUID: 82 %
MAGNESIUM SERPL-MCNC: 1.9 MG/DL (ref 1.7–2.4)
MCH RBC QN AUTO: 28.3 PG (ref 27–31.3)
MCHC RBC AUTO-ENTMCNC: 31.7 % (ref 33–37)
MCV RBC AUTO: 89.1 FL (ref 79.4–94.8)
MONOCYTES # BLD: 1 K/UL (ref 0.2–0.8)
MONOCYTES NFR BLD: 7.8 %
NEUTROPHIL, FLUID: 18 %
NEUTROPHILS # BLD: 9.6 K/UL (ref 1.4–6.5)
NEUTS SEG NFR BLD: 71.9 %
NUCLEATED CELLS FLUID: 74 /CUMM
NUMBER OF CELLS COUNTED FLUID: 100
PERFORMED ON: ABNORMAL
PHOSPHATE SERPL-MCNC: 4.8 MG/DL (ref 2.3–4.8)
PLATELET # BLD AUTO: 216 K/UL (ref 130–400)
POTASSIUM SERPL-SCNC: 4.4 MEQ/L (ref 3.4–4.9)
PROT FLD-MCNC: 2.9 G/DL
RBC # BLD AUTO: 2.58 M/UL (ref 4.2–5.4)
RBC FLUID: <2000 /CUMM
SODIUM SERPL-SCNC: 131 MEQ/L (ref 135–144)
SPECIMEN TYPE: NORMAL
TOTAL IRON BINDING CAPACITY: 134 UG/DL (ref 250–450)
UNSATURATED IRON BINDING CAPACITY: 66 UG/DL (ref 112–347)
VITAMIN B-12: 1149 PG/ML (ref 232–1245)
VITAMIN D 25-HYDROXY: 39.3 NG/ML (ref 30–100)
WBC # BLD AUTO: 13.3 K/UL (ref 4.8–10.8)

## 2024-07-30 PROCEDURE — 2580000003 HC RX 258: Performed by: NURSE PRACTITIONER

## 2024-07-30 PROCEDURE — 82150 ASSAY OF AMYLASE: CPT

## 2024-07-30 PROCEDURE — 3600000014 HC SURGERY LEVEL 4 ADDTL 15MIN: Performed by: COLON & RECTAL SURGERY

## 2024-07-30 PROCEDURE — 36415 COLL VENOUS BLD VENIPUNCTURE: CPT

## 2024-07-30 PROCEDURE — 2580000003 HC RX 258: Performed by: COLON & RECTAL SURGERY

## 2024-07-30 PROCEDURE — 99291 CRITICAL CARE FIRST HOUR: CPT | Performed by: INTERNAL MEDICINE

## 2024-07-30 PROCEDURE — 83540 ASSAY OF IRON: CPT

## 2024-07-30 PROCEDURE — 82607 VITAMIN B-12: CPT

## 2024-07-30 PROCEDURE — 0JH63XZ INSERTION OF TUNNELED VASCULAR ACCESS DEVICE INTO CHEST SUBCUTANEOUS TISSUE AND FASCIA, PERCUTANEOUS APPROACH: ICD-10-PCS | Performed by: COLON & RECTAL SURGERY

## 2024-07-30 PROCEDURE — 82945 GLUCOSE OTHER FLUID: CPT

## 2024-07-30 PROCEDURE — 3600000004 HC SURGERY LEVEL 4 BASE: Performed by: COLON & RECTAL SURGERY

## 2024-07-30 PROCEDURE — 6370000000 HC RX 637 (ALT 250 FOR IP): Performed by: COLON & RECTAL SURGERY

## 2024-07-30 PROCEDURE — 0W9G3ZX DRAINAGE OF PERITONEAL CAVITY, PERCUTANEOUS APPROACH, DIAGNOSTIC: ICD-10-PCS | Performed by: INTERNAL MEDICINE

## 2024-07-30 PROCEDURE — 76937 US GUIDE VASCULAR ACCESS: CPT | Performed by: COLON & RECTAL SURGERY

## 2024-07-30 PROCEDURE — 2580000003 HC RX 258: Performed by: ANESTHESIOLOGY

## 2024-07-30 PROCEDURE — 36561 INSERT TUNNELED CV CATH: CPT | Performed by: COLON & RECTAL SURGERY

## 2024-07-30 PROCEDURE — 82728 ASSAY OF FERRITIN: CPT

## 2024-07-30 PROCEDURE — C1751 CATH, INF, PER/CENT/MIDLINE: HCPCS | Performed by: COLON & RECTAL SURGERY

## 2024-07-30 PROCEDURE — 82042 OTHER SOURCE ALBUMIN QUAN EA: CPT

## 2024-07-30 PROCEDURE — 6360000002 HC RX W HCPCS: Performed by: INTERNAL MEDICINE

## 2024-07-30 PROCEDURE — 49083 ABD PARACENTESIS W/IMAGING: CPT | Performed by: INTERNAL MEDICINE

## 2024-07-30 PROCEDURE — 85025 COMPLETE CBC W/AUTO DIFF WBC: CPT

## 2024-07-30 PROCEDURE — 83735 ASSAY OF MAGNESIUM: CPT

## 2024-07-30 PROCEDURE — 6360000002 HC RX W HCPCS: Performed by: COLON & RECTAL SURGERY

## 2024-07-30 PROCEDURE — 05HN33Z INSERTION OF INFUSION DEVICE INTO LEFT INTERNAL JUGULAR VEIN, PERCUTANEOUS APPROACH: ICD-10-PCS | Performed by: COLON & RECTAL SURGERY

## 2024-07-30 PROCEDURE — 80069 RENAL FUNCTION PANEL: CPT

## 2024-07-30 PROCEDURE — 3700000001 HC ADD 15 MINUTES (ANESTHESIA): Performed by: COLON & RECTAL SURGERY

## 2024-07-30 PROCEDURE — P9047 ALBUMIN (HUMAN), 25%, 50ML: HCPCS | Performed by: INTERNAL MEDICINE

## 2024-07-30 PROCEDURE — 6360000002 HC RX W HCPCS: Performed by: NURSE ANESTHETIST, CERTIFIED REGISTERED

## 2024-07-30 PROCEDURE — A4217 STERILE WATER/SALINE, 500 ML: HCPCS | Performed by: COLON & RECTAL SURGERY

## 2024-07-30 PROCEDURE — 83615 LACTATE (LD) (LDH) ENZYME: CPT

## 2024-07-30 PROCEDURE — 89051 BODY FLUID CELL COUNT: CPT

## 2024-07-30 PROCEDURE — 2709999900 HC NON-CHARGEABLE SUPPLY: Performed by: COLON & RECTAL SURGERY

## 2024-07-30 PROCEDURE — 82746 ASSAY OF FOLIC ACID SERUM: CPT

## 2024-07-30 PROCEDURE — 3700000000 HC ANESTHESIA ATTENDED CARE: Performed by: COLON & RECTAL SURGERY

## 2024-07-30 PROCEDURE — 2000000000 HC ICU R&B

## 2024-07-30 PROCEDURE — C1769 GUIDE WIRE: HCPCS | Performed by: COLON & RECTAL SURGERY

## 2024-07-30 PROCEDURE — 2500000003 HC RX 250 WO HCPCS: Performed by: COLON & RECTAL SURGERY

## 2024-07-30 PROCEDURE — 77001 FLUOROGUIDE FOR VEIN DEVICE: CPT | Performed by: COLON & RECTAL SURGERY

## 2024-07-30 PROCEDURE — 03JY3ZZ INSPECTION OF UPPER ARTERY, PERCUTANEOUS APPROACH: ICD-10-PCS | Performed by: COLON & RECTAL SURGERY

## 2024-07-30 PROCEDURE — 0W9G3ZZ DRAINAGE OF PERITONEAL CAVITY, PERCUTANEOUS APPROACH: ICD-10-PCS | Performed by: INTERNAL MEDICINE

## 2024-07-30 PROCEDURE — 83550 IRON BINDING TEST: CPT

## 2024-07-30 PROCEDURE — 2500000003 HC RX 250 WO HCPCS: Performed by: NURSE PRACTITIONER

## 2024-07-30 PROCEDURE — 87070 CULTURE OTHR SPECIMN AEROBIC: CPT

## 2024-07-30 PROCEDURE — 84157 ASSAY OF PROTEIN OTHER: CPT

## 2024-07-30 PROCEDURE — 82306 VITAMIN D 25 HYDROXY: CPT

## 2024-07-30 PROCEDURE — 71045 X-RAY EXAM CHEST 1 VIEW: CPT

## 2024-07-30 RX ORDER — INSULIN LISPRO 100 [IU]/ML
0-16 INJECTION, SOLUTION INTRAVENOUS; SUBCUTANEOUS
Status: DISCONTINUED | OUTPATIENT
Start: 2024-07-30 | End: 2024-07-31

## 2024-07-30 RX ORDER — SODIUM CHLORIDE 0.9 % (FLUSH) 0.9 %
5-40 SYRINGE (ML) INJECTION EVERY 12 HOURS SCHEDULED
Status: DISCONTINUED | OUTPATIENT
Start: 2024-07-30 | End: 2024-07-30 | Stop reason: HOSPADM

## 2024-07-30 RX ORDER — BUPIVACAINE HYDROCHLORIDE AND EPINEPHRINE 5; 5 MG/ML; UG/ML
INJECTION, SOLUTION EPIDURAL; INTRACAUDAL; PERINEURAL PRN
Status: DISCONTINUED | OUTPATIENT
Start: 2024-07-30 | End: 2024-07-30 | Stop reason: HOSPADM

## 2024-07-30 RX ORDER — DIPHENHYDRAMINE HYDROCHLORIDE 50 MG/ML
12.5 INJECTION INTRAMUSCULAR; INTRAVENOUS
Status: DISCONTINUED | OUTPATIENT
Start: 2024-07-30 | End: 2024-07-31

## 2024-07-30 RX ORDER — INSULIN LISPRO 100 [IU]/ML
0-4 INJECTION, SOLUTION INTRAVENOUS; SUBCUTANEOUS NIGHTLY
Status: DISCONTINUED | OUTPATIENT
Start: 2024-07-30 | End: 2024-07-31

## 2024-07-30 RX ORDER — MIDAZOLAM HYDROCHLORIDE 1 MG/ML
INJECTION INTRAMUSCULAR; INTRAVENOUS PRN
Status: DISCONTINUED | OUTPATIENT
Start: 2024-07-30 | End: 2024-07-30 | Stop reason: SDUPTHER

## 2024-07-30 RX ORDER — METOCLOPRAMIDE HYDROCHLORIDE 5 MG/ML
10 INJECTION INTRAMUSCULAR; INTRAVENOUS
Status: DISCONTINUED | OUTPATIENT
Start: 2024-07-30 | End: 2024-07-31

## 2024-07-30 RX ORDER — SODIUM CHLORIDE 0.9 % (FLUSH) 0.9 %
5-40 SYRINGE (ML) INJECTION EVERY 12 HOURS SCHEDULED
Status: DISCONTINUED | OUTPATIENT
Start: 2024-07-30 | End: 2024-08-08 | Stop reason: HOSPADM

## 2024-07-30 RX ORDER — SODIUM CHLORIDE 9 MG/ML
INJECTION, SOLUTION INTRAVENOUS PRN
Status: DISCONTINUED | OUTPATIENT
Start: 2024-07-30 | End: 2024-08-08 | Stop reason: HOSPADM

## 2024-07-30 RX ORDER — SODIUM CHLORIDE 0.9 % (FLUSH) 0.9 %
5-40 SYRINGE (ML) INJECTION PRN
Status: DISCONTINUED | OUTPATIENT
Start: 2024-07-30 | End: 2024-08-08 | Stop reason: HOSPADM

## 2024-07-30 RX ORDER — MEPERIDINE HYDROCHLORIDE 25 MG/ML
12.5 INJECTION INTRAMUSCULAR; INTRAVENOUS; SUBCUTANEOUS
Status: DISCONTINUED | OUTPATIENT
Start: 2024-07-30 | End: 2024-07-31

## 2024-07-30 RX ORDER — FENTANYL CITRATE 0.05 MG/ML
50 INJECTION, SOLUTION INTRAMUSCULAR; INTRAVENOUS EVERY 10 MIN PRN
Status: DISCONTINUED | OUTPATIENT
Start: 2024-07-30 | End: 2024-08-08 | Stop reason: HOSPADM

## 2024-07-30 RX ORDER — SODIUM CHLORIDE 9 MG/ML
INJECTION, SOLUTION INTRAVENOUS PRN
Status: DISCONTINUED | OUTPATIENT
Start: 2024-07-30 | End: 2024-07-30 | Stop reason: HOSPADM

## 2024-07-30 RX ORDER — ALBUMIN (HUMAN) 12.5 G/50ML
50 SOLUTION INTRAVENOUS 3 TIMES DAILY
Status: COMPLETED | OUTPATIENT
Start: 2024-07-30 | End: 2024-07-30

## 2024-07-30 RX ORDER — HEPARIN 100 UNIT/ML
SYRINGE INTRAVENOUS PRN
Status: DISCONTINUED | OUTPATIENT
Start: 2024-07-30 | End: 2024-07-30 | Stop reason: HOSPADM

## 2024-07-30 RX ORDER — PROPOFOL 10 MG/ML
INJECTION, EMULSION INTRAVENOUS PRN
Status: DISCONTINUED | OUTPATIENT
Start: 2024-07-30 | End: 2024-07-30 | Stop reason: SDUPTHER

## 2024-07-30 RX ORDER — ONDANSETRON 2 MG/ML
4 INJECTION INTRAMUSCULAR; INTRAVENOUS
Status: DISCONTINUED | OUTPATIENT
Start: 2024-07-30 | End: 2024-07-31

## 2024-07-30 RX ORDER — NALOXONE HYDROCHLORIDE 0.4 MG/ML
INJECTION, SOLUTION INTRAMUSCULAR; INTRAVENOUS; SUBCUTANEOUS PRN
Status: DISCONTINUED | OUTPATIENT
Start: 2024-07-30 | End: 2024-08-08 | Stop reason: HOSPADM

## 2024-07-30 RX ORDER — SODIUM CHLORIDE 0.9 % (FLUSH) 0.9 %
5-40 SYRINGE (ML) INJECTION PRN
Status: DISCONTINUED | OUTPATIENT
Start: 2024-07-30 | End: 2024-07-30 | Stop reason: HOSPADM

## 2024-07-30 RX ORDER — MAGNESIUM HYDROXIDE 1200 MG/15ML
LIQUID ORAL CONTINUOUS PRN
Status: COMPLETED | OUTPATIENT
Start: 2024-07-30 | End: 2024-07-30

## 2024-07-30 RX ADMIN — AMOXICILLIN 500 MG: 500 CAPSULE ORAL at 20:28

## 2024-07-30 RX ADMIN — DOCUSATE SODIUM 100 MG: 100 CAPSULE, LIQUID FILLED ORAL at 11:53

## 2024-07-30 RX ADMIN — Medication 2000 UNITS: at 11:53

## 2024-07-30 RX ADMIN — Medication 10 ML: at 11:54

## 2024-07-30 RX ADMIN — Medication 10 ML: at 09:14

## 2024-07-30 RX ADMIN — INSULIN GLARGINE 35 UNITS: 100 INJECTION, SOLUTION SUBCUTANEOUS at 20:34

## 2024-07-30 RX ADMIN — MIDODRINE HYDROCHLORIDE 20 MG: 10 TABLET ORAL at 11:52

## 2024-07-30 RX ADMIN — Medication 5 MG: at 20:28

## 2024-07-30 RX ADMIN — HYDROCORTISONE 10 MG: 10 TABLET ORAL at 11:52

## 2024-07-30 RX ADMIN — FLUDROCORTISONE ACETATE 0.1 MG: 0.1 TABLET ORAL at 11:53

## 2024-07-30 RX ADMIN — Medication: at 11:52

## 2024-07-30 RX ADMIN — MIDODRINE HYDROCHLORIDE 20 MG: 10 TABLET ORAL at 18:23

## 2024-07-30 RX ADMIN — Medication 10 ML: at 21:52

## 2024-07-30 RX ADMIN — NEPHROCAP 1 MG: 1 CAP ORAL at 11:53

## 2024-07-30 RX ADMIN — DOCUSATE SODIUM 100 MG: 100 CAPSULE, LIQUID FILLED ORAL at 20:28

## 2024-07-30 RX ADMIN — PROPOFOL 75 MCG/KG/MIN: 10 INJECTION, EMULSION INTRAVENOUS at 07:31

## 2024-07-30 RX ADMIN — ALBUMIN (HUMAN) 50 G: 0.25 INJECTION, SOLUTION INTRAVENOUS at 14:30

## 2024-07-30 RX ADMIN — SERTRALINE HYDROCHLORIDE 50 MG: 50 TABLET ORAL at 11:53

## 2024-07-30 RX ADMIN — HYDROCORTISONE 5 MG: 10 TABLET ORAL at 20:28

## 2024-07-30 RX ADMIN — ALBUMIN (HUMAN) 50 G: 0.25 INJECTION, SOLUTION INTRAVENOUS at 10:14

## 2024-07-30 RX ADMIN — MIDAZOLAM HYDROCHLORIDE 2 MG: 1 INJECTION, SOLUTION INTRAMUSCULAR; INTRAVENOUS at 07:27

## 2024-07-30 RX ADMIN — ALBUMIN (HUMAN) 50 G: 0.25 INJECTION, SOLUTION INTRAVENOUS at 20:47

## 2024-07-30 RX ADMIN — MICONAZOLE NITRATE: 2 POWDER TOPICAL at 21:52

## 2024-07-30 RX ADMIN — FLUDROCORTISONE ACETATE 0.1 MG: 0.1 TABLET ORAL at 20:28

## 2024-07-30 RX ADMIN — PANTOPRAZOLE SODIUM 20 MG: 20 TABLET, DELAYED RELEASE ORAL at 11:53

## 2024-07-30 RX ADMIN — MICONAZOLE NITRATE: 2 POWDER TOPICAL at 09:15

## 2024-07-30 RX ADMIN — SODIUM CHLORIDE: 9 INJECTION, SOLUTION INTRAVENOUS at 07:02

## 2024-07-30 RX ADMIN — PROPOFOL 50 MG: 10 INJECTION, EMULSION INTRAVENOUS at 07:30

## 2024-07-30 RX ADMIN — ARIPIPRAZOLE 5 MG: 2 TABLET ORAL at 11:53

## 2024-07-30 RX ADMIN — AMOXICILLIN 500 MG: 500 CAPSULE ORAL at 11:52

## 2024-07-30 ASSESSMENT — PAIN SCALES - GENERAL
PAINLEVEL_OUTOF10: 0

## 2024-07-30 NOTE — PROCEDURES
PROCEDURE NOTE  Date: 7/30/2024   Name: Michelle Le  YOB: 1960    Paracentesis    Date/Time: 7/30/2024 11:03 AM    Performed by: Say Marsh MD  Authorized by: Milagros Hanna APRN - CNP  Consent: Verbal consent obtained.  Consent given by: patient  Patient understanding: patient states understanding of the procedure being performed  Patient consent: the patient's understanding of the procedure matches consent given  Procedure consent: procedure consent matches procedure scheduled  Relevant documents: relevant documents present and verified  Test results: test results available and properly labeled  Site marked: the operative site was marked  Imaging studies: imaging studies available  Required items: required blood products, implants, devices, and special equipment available  Patient identity confirmed: arm band  Time out: Immediately prior to procedure a \"time out\" was called to verify the correct patient, procedure, equipment, support staff and site/side marked as required.  Initial or subsequent exam: initial  Procedure purpose: therapeutic and diagnostic  Indications: abdominal discomfort secondary to ascites and suspected peritonitis  Anesthesia: local infiltration    Anesthesia:  Local Anesthetic: lidocaine 2% with epinephrine    Sedation:  Patient sedated: no    Preparation: Patient was prepped and draped in the usual sterile fashion.  Needle gauge: 20  Ultrasound guidance: yes  Puncture site: right lower quadrant  Fluid removed: 4000(ml)  Fluid appearance: clear  Dressing: 4x4 sterile gauze  Comments: PROCEDURE:  Diagnostic & Therapeutic Paracentesis , U/S guided.  [id number]    DIAGNOSES:  INDICATION:  Ascites.  Suspected SBP.          PROCEDURE SUMMARY:  A time-out was performed. The area of the  right abdomen was prepped and draped in a sterile fashion  using chlorhexidine scrub. 1% lidocaine was used to numb the region. The skin was incised 1.5 mm using a 10 blade  scalpel. The

## 2024-07-30 NOTE — PROGRESS NOTES
Pulmonary & Critical Care Medicine ICU Progress Note  Chief complaint : Hypotension     Subjunctive/24 hour events :   Patient seen and examined during multidisciplinary rounds with RN, charge nurse, RT, pharmacy, dietitian, and social service.   Patient had an uneventful night. She is on room air and O2 sats are 100%. Remains on levophed at 8 mcg/min. She had a gomez catheter placed today, currently she is still groggy. No fever overnight and anuric. Tolerating a po diet and  no Bm noted.       Social History     Tobacco Use    Smoking status: Never     Passive exposure: Never    Smokeless tobacco: Never   Substance Use Topics    Alcohol use: No     Alcohol/week: 0.0 standard drinks of alcohol         Problem Relation Age of Onset    Cancer Mother 68        survived    Breast Cancer Mother     Hypertension Father     Diabetes Sister     Mental Illness Sister     Colon Cancer Neg Hx        No results for input(s): \"PHART\", \"OHO5WTH\", \"PO2ART\" in the last 72 hours.    MV Settings:     / / /            IV:   sodium chloride      norepinephrine 8 mcg/min (07/30/24 0727)    sodium chloride      dextrose         Vitals:  BP (!) 112/42   Pulse 87   Temp 97.4 °F (36.3 °C) (Temporal)   Resp 20   Ht 1.727 m (5' 8\")   Wt 97 kg (213 lb 14.4 oz)   LMP  (LMP Unknown)   SpO2 99%   BMI 32.52 kg/m²    Tmax:       Intake/Output Summary (Last 24 hours) at 7/30/2024 0935  Last data filed at 7/30/2024 0848  Gross per 24 hour   Intake 684.15 ml   Output --   Net 684.15 ml         EXAM:    General: alert, cooperative  Head: normocephalic, atraumatic  Eyes:No gross abnormalities.  ENT:  MMM no lesions  Neck:  supple and no masses  Chest : Fair air movement no rales no wheezes   Heart:: Heart sounds are normal.  Regular rate and rhythm without murmur, gallop or rub.  ABD:  bowel sounds normal, soft, non-tender  Musculoskeletal : no cyanosis, no clubbing, and no edema  Neuro:  Grossly normal  Skin: No rashes or nodules  EXAM.      Results for orders placed during the hospital encounter of 04/20/22    XR CHEST (2 VW)    Narrative  EXAMINATION: XR CHEST (2 VW)    CLINICAL HISTORY: ABDOMINAL PAIN    COMPARISONS: CHEST RADIOGRAPH APRIL 8, 2022    FINDINGS: Median sternotomy.. Cardiopericardial silhouette upper limits normal, unchanged. Pulmonary vasculature normal. Right lung clear. Blunting left costophrenic angle again identified. Ill-defined area increased opacity lateral left lower chest  decreased since prior study.    Impression  BORDERLINE CARDIOMEGALY. LEFT PLEURAL EFFUSION VERSUS THICKENING. LEFT LOWER LUNG SCARRING VERSUS ATELECTASIS/PNEUMONIA.       Portable: Results for orders placed during the hospital encounter of 04/16/24    XR CHEST PORTABLE    Narrative  EXAMINATION:  ONE XRAY VIEW OF THE CHEST    4/22/2024 10:27 am    COMPARISON:  Chest x-ray from April 20, 2024    HISTORY:  ORDERING SYSTEM PROVIDED HISTORY: intubation/OG tube placement  TECHNOLOGIST PROVIDED HISTORY:  Reason for exam:->intubation/OG tube placement  What reading provider will be dictating this exam?->CRC    FINDINGS:  No change in the arm right subclavian central line.    Status post intubation with the tip of the endotracheal tube approximately 3  cm from the tabitha.    There is an NG tube coursing to the stomach in good position.    The lungs and pleural spaces are without acute focal process.  Low  inspiratory volume study.    The cardiomediastinal silhouette is enlarged but unchanged.    There is no evidence of pneumothorax.    The osseous structures are without acute process.    Impression  No acute process.  Status post intubation, there is an NG tube in good  position.      Echo No results found for this or any previous visit.        Assessment:  This is a critically ill patient at risk of deterioration / death , needing close ICU monitoring and intervention due to below noted problems   Acute blood loss anemia, bleeding fistula site  Chronic  anemia baseline 10.0   ESRD dialysis M-W-F   Hypotension, ongoing during dialysis and d/t anemia    Adrenal insuffiencey, on hydrocortisone at home   Recommendations   Nephrology is following, dialysis M-W-F, dialysis today    Wean levophed for a SBP >90   Continue Midodrine   Continue hydrocortisone   Continue florinef   Albumin 50 grams TID today   Paracentesis today at the bedside   General surgery for gomez today   DVT Prophylaxis   PUD Prophylaxis   Maintain blood sugar 140-180  Monitor electrolytes and replace as needed   Monitor urine output and avoid overload              Electronically signed by LORETO Ang - CNP,  FCCP ,on 7/30/2024 at 9:35 AM

## 2024-07-30 NOTE — PLAN OF CARE
Problem: Discharge Planning  Goal: Discharge to home or other facility with appropriate resources  Outcome: Progressing  Flowsheets (Taken 7/29/2024 2128)  Discharge to home or other facility with appropriate resources: Identify barriers to discharge with patient and caregiver     Problem: Safety - Adult  Goal: Free from fall injury  Outcome: Progressing     Problem: Pain  Goal: Verbalizes/displays adequate comfort level or baseline comfort level  Outcome: Progressing     Problem: Chronic Conditions and Co-morbidities  Goal: Patient's chronic conditions and co-morbidity symptoms are monitored and maintained or improved  Recent Flowsheet Documentation  Taken 7/29/2024 2128 by Vamsi Rmaos, RN  Care Plan - Patient's Chronic Conditions and Co-Morbidity Symptoms are Monitored and Maintained or Improved: Collaborate with multidisciplinary team to address chronic and comorbid conditions and prevent exacerbation or deterioration      Yes

## 2024-07-30 NOTE — CARE COORDINATION
Team ICU rounds done this am. I called dtr Angelina to clarify pt services and she states pt has [facility] for PCP and has P.T. 2 x week. Dtr Angelina is her aide through [employer] and she used to have 8 hours/day. Pt just got her [employer] reinstated dtr states she was in a facility from March 5th until June 20th and had lost her Medicaid and now it is reinstated. She states it will be up to pt re:SNF but [facility] or [facility] would be ok if she does want SNF prior to home. Dtr very concerned about the bleeding from AVF and states that is what caused last 2 admits and she wants to make sure that will not happen again. Pt had Chantale done today also.

## 2024-07-30 NOTE — INTERDISCIPLINARY ROUNDS
Spiritual Care Services     Summary of Visit:  Attended interdisciplinary rounds. Pt is recovering, resting after a procedure. No family present. Pt has an AD, naming her dtr Paulina as POA. Pt is Jain.       Encounter Summary  Encounter Overview/Reason: Interdisciplinary rounds  Service Provided For: Patient  [facility]  Support System: [employer]  Last Encounter : 07/29/24  Complexity of Encounter: Moderate  Begin Time: 1145  End Time : 1205  Total Time Calculated: 20 min                               Spiritual Assessment/Intervention/Outcomes:    Assessment: Compromised coping    Intervention: Active listening    Outcome: Comfort      Care Plan:    Plan and Referrals  Plan/Referrals: Continue to visit, (comment)      Spiritual Care Services   Electronically signed by LUKAS Rosenthal on 7/30/2024 at 10:56 AM.    To reach a  for emotional and spiritual support, place an EPIC consult request.   If a  is needed immediately, dial “0” and ask to page the on-call .

## 2024-07-30 NOTE — PROGRESS NOTES
PHARMACY NOTE:   ICU Rounds Attended (10-15 minutes in patient room):    Pt diagnosis: anemia    IV Fluids: none   Renal:   Recent Labs     07/28/24  0516 07/29/24  0525 07/30/24  0500   CREATININE 4.73* 5.25* 6.18*    Estimated Creatinine Clearance: 11 mL/min (A) (based on SCr of 6.18 mg/dL (HH)).        Antimicrobial Therapy:    Antimicrobial agents: chronic amoxicillin     Recent Labs     07/28/24  0516 07/29/24  0525 07/30/24  0500   WBC 14.5* 12.2* 13.3*         Pressors:   norepinephrine @ 8 mcg/min    Insulin Therapy (goal: 140-180): medium SSI-changed to high   0 units given past 24 hours   Lantus 35 units   Recent Labs     07/28/24  0516 07/29/24  0525 07/30/24  0500   GLUCOSE 277* 130* 215*     Steroid Therapy: hydrocortisone, fludrocortisone     Stress Ulcer Prophylaxis:   Pantoprazole     DVT Prophylaxis/Anticoagulant Therapy: none-home eliquis  Recent Labs     07/28/24  0516 07/29/24  0525 07/30/24  0500    172 216     No results for input(s): \"INR\" in the last 72 hours.      Bowel Regimen:   Colace BID  Miralax daily    Follow up/Changes:   -albumin 50g TID for 3 doses today per verbal on rounds  -SSI changed to high per verbal on rounds  -celox x 1 dose per verbal on rounds for gomez site  -home meds reviewed follow up resume as appropriate; eliquis, atorvastatin, hydroxyzine  -monitor qtc       Leti Freeman [facility] PharmD

## 2024-07-30 NOTE — DIALYSIS
Cleaning access arm and removing old gauze and tape and access began to bleed profusely. Floor nurse in the room to assist. Floor nurse gave us wound seal to get the bleeding under control. Held sites for greater than 20 min. Dr. Larios notified. Treatment cancelled for today, and nephrologist will order a fistulogram. Report given to floor nurse.

## 2024-07-30 NOTE — ANESTHESIA PRE PROCEDURE
04/25/2024 10:44 AM    PO2ART 140 04/25/2024 10:44 AM    WVR2XVJ 39 04/25/2024 10:44 AM    BSB5XRH 23.2 04/25/2024 10:44 AM    BEART -2 04/25/2024 10:44 AM    Z1HKBSPX 99 04/25/2024 10:44 AM        Type & Screen (If Applicable):  No results found for: \"LABABO\"    Drug/Infectious Status (If Applicable):  No results found for: \"HIV\", \"HEPCAB\"    COVID-19 Screening (If Applicable):   Lab Results   Component Value Date/Time    COVID19 Not Detected 03/29/2024 11:06 AM    COVID19 Not Detected 12/26/2023 12:58 AM           Anesthesia Evaluation  Patient summary reviewed and Nursing notes reviewed   no history of anesthetic complications:   Airway: Mallampati: II  TM distance: >3 FB   Neck ROM: full  Mouth opening: > = 3 FB   Dental: normal exam         Pulmonary:normal exam    (+)  COPD:    sleep apnea:       asthma:                            Cardiovascular:    (+) hypertension:, angina:, past MI:, CAD:, CHF:                  Neuro/Psych:   (+) neuromuscular disease:            GI/Hepatic/Renal:   (+) GERD:, hepatitis: C, liver disease:          Endo/Other:    (+) Diabetes.                 Abdominal:   (+) obese          Vascular:          Other Findings:       Anesthesia Plan      general     ASA 4       Induction: intravenous.    MIPS: Prophylactic antiemetics administered.  Anesthetic plan and risks discussed with patient.      Plan discussed with CRNA.    Attending anesthesiologist reviewed and agrees with Preprocedure content            Donnie Perez MD   7/30/2024

## 2024-07-30 NOTE — PROGRESS NOTES
Hospitalist Progress Note      PCP: Adilene Terry MD    Date of Admission: 7/27/2024    Chief Complaint:  no acute events, afebrile, remains hypotensive requiring Norepinephrine support,     Medications:  Reviewed    Infusion Medications    sodium chloride 50 mL/hr at 07/30/24 0702    norepinephrine 8 mcg/min (07/30/24 0023)    sodium chloride      dextrose       Scheduled Medications    sodium chloride flush  5-40 mL IntraVENous 2 times per day    amoxicillin  500 mg Oral 2 times per day    Virt-Caps  1 capsule Oral Daily    docusate sodium  100 mg Oral BID    pantoprazole  20 mg Oral Daily    Vitamin D  2,000 Units Oral Daily    ARIPiprazole  5 mg Oral Daily    fludrocortisone  0.1 mg Oral BID    sertraline  50 mg Oral Daily    miconazole   Topical BID    epoetin chadwick-epbx  8,000 Units SubCUTAneous Once per day on Mon Wed Fri    sodium chloride flush  5-40 mL IntraVENous 2 times per day    insulin glargine  35 Units SubCUTAneous Nightly    insulin lispro  0-8 Units SubCUTAneous TID WC    insulin lispro  0-4 Units SubCUTAneous Nightly    hydrocortisone  5 mg Oral Nightly    hydrocortisone  10 mg Oral Daily    midodrine  20 mg Oral TID WC    budesonide-formoterol  2 puff Inhalation BID RT    And    tiotropium  2 puff Inhalation Daily RT    pill splitter   Does not apply Once     PRN Meds: sodium chloride flush, sodium chloride, albuterol, melatonin, sodium chloride flush, sodium chloride, ondansetron **OR** ondansetron, polyethylene glycol, acetaminophen **OR** acetaminophen, glucose, dextrose bolus **OR** dextrose bolus, glucagon (rDNA), dextrose      Intake/Output Summary (Last 24 hours) at 7/30/2024 0722  Last data filed at 7/30/2024 0023  Gross per 24 hour   Intake 84.15 ml   Output --   Net 84.15 ml         Exam:    BP (!) 112/42   Pulse 87   Temp 97.4 °F (36.3 °C) (Temporal)   Resp 20   Ht 1.727 m (5' 8\")   Wt 97 kg (213 lb 14.4 oz)   LMP  (LMP Unknown)   SpO2 99%   BMI 32.52 kg/m²     General  with Amoxicillin, recently managed at [facility] from 3/29-4/12 for cardiac arrest s/p ROSC, acute / chronic hypotension due to septic shock and adrenal insufficiency and 4/16-5/1 for acute blood loss anemia due GI bleed in the setting of severely elevated INR , shock,  abdominal distention requiring large volume paracentesis, who presented with :      Acute blood loss anemia  - due to bleeding at dialysis catheter site  - Hb improved s/p transfusion of PRBCs  - on Retacrit  - monitor H/H closely    Acute / chronic hypotension  - in the setting of blood loss, adrenal insufficiency  - on Norepinephrine infusion, Midodrine, Hydrocortisone, Florinef  - followed by critical care service    Ascites   - requiring large volume paracentesis x 2 in 4/24 and 7/8  - CT of abd/pelvis showed large volume ascites, small bilateral pleural effusions and anasarca   - s/p paracentesis today with drainage of 4 liters of fluid  - fluid cell count not suggestive of SBP     ESRD on IHD  - followed by nephrology     PAF/AFL  - holding Apixaban due to bleeding  - monitor on telemetry     Hx of CAD s/p CABG/PCI, CVA  - resume ASA when able     IDDM with hyperglycemia  - on ISS, Lantus     Chronic left knee PJI/OM   - continue chronic suppressive therapy (Amoxicillin)    Diet: Diet NPO Exceptions are: Sips of Water with Meds    Code Status: Full Code          Electronically signed by WILEY BARROW MD on 7/30/2024 at 7:22 AM

## 2024-07-30 NOTE — BRIEF OP NOTE
Brief Postoperative Note      Patient: Michelle Le  YOB: 1960  MRN: [id number]    Date of Procedure: 7/30/2024    Pre-Op Diagnosis Codes:     * Lack of intravenous access [Z78.9]    Post-Op Diagnosis: Same       Procedure: Ultrasound-guided left internal jugular tunneled Kenyon catheter placement    Surgeon(s):  Joss Denise MD    Assistant:  First Assistant: Karyn Beaulieu    Anesthesia: MAC, local    Estimated Blood Loss (mL): 30    Complications: Other: Inability to place right sided catheter, left internal jugular catheter placed    Specimens:   * No specimens in log *    Implants:  * No implants in log *      Drains: * No LDAs found *    Findings:  Infection Present At Time Of Surgery (PATOS) (choose all levels that have infection present):  No infection present      Electronically signed by JOSS DENISE MD on 7/30/2024 at 8:41 AM

## 2024-07-30 NOTE — PROGRESS NOTES
Nephrology Progress Note    Assessment:  64 y.o. year old female who presented to the emergency department for further evaluation and management of sudden onset of AV fistula bleed associated with hypotension with a blood pressure in the 50s admitted to critical care bed for further management though it did take place as per patient it seems like it did take place after pulling the bandage of the dialysis site  This clinical presentation did take place in a patient with past medical history of diabetes hypertension GERD COPD and she is on dialysis Monday Wednesday Friday anemia of chronic kidney disease secondary hyperparathyroidism and hyperphosphatemia     Patient still hypotensive with a blood pressure in the 99/38 no significant tachycardia no clinical or laboratory indication for dialysis  Careful examination of the patient AV fistula is functional left upper arm with felt thrill and heard bruit no evidence of active bleeding patient did improve her hemoglobin after packed RBCs transfusion        Plan:  - remains on pressors however trying to avoid placement of dialysis catheter for CRRT so will attempt short HD session today and reassess tomorrow        Patient Active Problem List:     Coronary artery disease involving native coronary artery of native heart with angina pectoris (HCC)     Schizophrenia, paranoid, chronic     Metabolic syndrome     Vitamin B 12 deficiency     Cerebral microvascular disease     Mixed hyperlipidemia     Other hammer toe (acquired)     Vitamin D insufficiency     Incontinence of urine     Diabetic nephropathy with proteinuria (HCC)     Essential (primary) hypertension     History of type C viral hepatitis     Urinary incontinence due to cognitive impairment     History of seizures     Stented coronary artery-plan is to stay on Plavix indefinately per Dr Walker     Diabetes mellitus (HCC)     Controlled type 2 diabetes mellitus with diabetic neuropathy, with long-term current use of  insulin ([facility])     Hemiparesis, left ([facility])     Angina, class II ([facility])     Pain, unspecified     Tardive dyskinesia     Shortness of breath     Poorly controlled diabetes mellitus ([facility])     Chronic diastolic congestive heart failure ([facility])     [place] (obstructive sleep apnea)     Pulmonary hypertension, unspecified ([facility])     Class 2 severe obesity with serious comorbidity and body mass index (BMI) of 36.0 to 36.9 in adult ([facility])     Edema     Closed supracondylar fracture of right humerus     Other chronic pain     Palliative care patient     Recurrent falls     Renal failure     Difficulty in walking     ESRD (end stage renal disease) on dialysis ([facility])     Weakness     Other seizures ([facility])     Moderate persistent asthma without complication     COVID-19     Post PTCA     Falls frequently     Chest wall contusion, left, initial encounter     Headache, unspecified     Paranoid schizophrenia ([facility])     Compression fracture of spine ([facility])     Closed rib fracture     Depression     Chronic obstructive pulmonary disease ([facility])     Critical illness polyneuropathy ([facility])     Multiple closed fractures of ribs of right side     Nonrheumatic mitral valve regurgitation     Nonrheumatic tricuspid valve regurgitation     Need for extended care facility     Chronic pain of both shoulders     Gastrointestinal hemorrhage with melena     Hemodialysis-associated hypotension     Anginal chest pain at rest ([facility])     Chest pain     Unstable angina ([facility])     NSTEMI (non-ST elevated myocardial infarction) ([facility])     CKD (chronic kidney disease) stage 4, GFR 15-29 ml/min ([facility])     Hyperkalemia     Impaired mobility and activities of daily living dt polyneuropathy and reccent fall      Dialysis patient ([facility])     Unspecified open wound of right upper arm, initial encounter     Multiple closed fractures of right lower extremity and ribs     Closed T11 fracture ([facility])     Encephalopathy acute     MRSA bacteremia     Sepsis due to Enterococcus ([facility])      Local infection due to central venous catheter     DJD (degenerative joint disease), cervical     Closed head injury     Sarcopenia     Fall from standing     Sepsis due to skin infection (HCC)     Chronic hepatitis C (HCC)     Catheter-related bloodstream infection     Enterococcus faecalis infection     Cervical stenosis of spinal canal     Ataxic gait     Lumbar stenosis with neurogenic claudication     Falls infrequently     Infection of venous access port     Diarrhea     Anemia, unspecified     Eczema     AMS (altered mental status)     Heart failure (HCC)     Interstitial lung disease (HCC)     Cellulitis of left hand     Chronic osteomyelitis of knee (HCC)     Generalized weakness     Shock (HCC)     Fluid overload     Encounter for hospice care discussion     Advanced care planning/counseling discussion     Goals of care, counseling/discussion     Palliative care encounter     Nonhealing nonsurgical wound     Skin picking habit     Hypotension     ESRD on dialysis (HCC)     Acute decompensated heart failure (HCC)     Constipation     Chronic antibiotic suppression     Chronic infection of prosthetic knee (HCC)     Lack of intravenous access     Pressure injury, unstageable, with suspected deep tissue injury (HCC)     Hypogammaglobulinemia (HCC)     Adrenal insufficiency (HCC)     Open wound of left hand without foreign body     Other ascites     Cirrhosis of liver with ascites (HCC)     Thrombocytopenia (HCC)     Acute anemia      Subjective:  Admit Date: 7/27/2024    Interval History: remains on pressors, higher requirements today, now w/ gomez catheter     Medications:  Scheduled Meds:   sodium chloride flush  5-40 mL IntraVENous 2 times per day    albumin human 25%  50 g IntraVENous TID    insulin lispro  0-16 Units SubCUTAneous TID WC    insulin lispro  0-4 Units SubCUTAneous Nightly    chitosan   Apply externally Once    sodium chloride flush  5-40 mL IntraVENous 2 times per day    amoxicillin  500

## 2024-07-30 NOTE — OP NOTE
[facility]                   [address] [place] [address]                            OPERATIVE REPORT      PATIENT NAME: VELASQUEZ GARBER            : 1960  MED REC NO: [id number]                        ROOM: [facility] OR Pool  ACCOUNT NO: [id number]                       ADMIT DATE: 2024  PROVIDER: Joss Denise MD      DATE OF PROCEDURE:  2024    SURGEON:  Joss Denise MD    ASSISTANT:  Ms. Beaulieu.    PREOPERATIVE DIAGNOSIS:  Lack of IV access.    POSTOPERATIVE DIAGNOSIS:  Lack of IV access.    PROCEDURES:    1. Attempted right-sided Kenyon catheter, unsuccessfully.  2. Ultrasound-guided left internal jugular tunneled Kenyon catheter placement.    ANESTHESIA:    1. MAC.  2. Local.    ESTIMATED BLOOD LOSS:  30.    SPECIMEN:  None.    COMPLICATIONS:  Inability to access right-sided internal jugular or subclavian.    INDICATIONS:  A 64-year-old female with significant lack of IV access.  Risks and benefits of Kenyon catheter ultrasound-guided right versus left described yesterday.  Risks of surgery including infection, bleeding, catheter complications, and pneumothorax outlined.  Despite the risks, she wished to proceed.  Consent obtained.    DESCRIPTION OF PROCEDURE:  She was taken to the operating room, placed in the supine position.  MAC anesthesia was administered.  The right and left neck were prepped and draped with a ChloraPrep-containing solution.  She received antibiotics preoperatively.  A time-out was taken for appropriate verification.    An ultrasound was used to identify the internal jugular vein on the right.  Multiple attempts to access the vein, but I was unable to advance any wire to place any type of sustainable catheter.    I started with the right subclavian vein to stay away from the side with her fistula.  Again, the wire problems persisted and I was unable to advance a wire to allow a sustainable catheter to be  placed.    The left side was then evaluated on ultrasound and the internal jugular was identified and accessed without issues.  A wire was inserted and confirmed on fluoroscopy.    A double-lumen Kenyon catheter was tunneled subcutaneously from the anterior chest wall to the exit site of the guidewire.  It was tailored to the appropriate length.  A dilator followed by dilator and sheath were inserted over the wire under fluoroscopy.  The dilator and wire were removed.  The catheter was advanced through as the sheath was pulled away.    Both ports aspirated venous blood and flushed without problems.  Hep-Lock saline was used for flush.  It was sutured to the skin and secured.  Both ports again aspirated venous blood and were hep-locked.  The catheter was in good position on fluoroscopy.  0.25% Marcaine was injected at all sites for local analgesia during the operation.    The lap, needle, instrument counts were all correct.    The patient was returned back to the intensive care unit for postop monitoring.          NANETTE TRAYLOR MD      D:  07/30/2024 09:20:08     T:  07/30/2024 09:41:27     [facility]EM[facility]  Job #:  [id number]     Doc#:  [id number]

## 2024-07-30 NOTE — FLOWSHEET NOTE
2000: Report received from day shift nurse. Responsibility assumed by this nurse and precepting ICU nurse. Initial encounter assessment completed and documented in the flowsheet. All safety checks completed, IV lines and gtt verified.     0215: the patient was bathed and linen changed. All signed and held orders have been released for AM procedures. Consents have been signed and witnessed. Consents placed in the patient's chart.     0552: Surgery called for patient report. Report given and awaiting transport arrival.     0615: Surgery arrived to transport patient. The patient has been educated about the procedure and expectations. The nurse accompanied the surgical RN to the PACU for pre-op.     0652: Dialysis called for the patient's AM procedure. The nurse notified the dialysis RN that the patient is currently in the PACU for procedure. The dialysis nurse acknowledged and stated that she would call back to the unit once the surgery is completed.     0703: the nurse notified the day shift RN of the patient's procedure and expected time of arrival back onto the unit.

## 2024-07-30 NOTE — ANESTHESIA POSTPROCEDURE EVALUATION
Department of Anesthesiology  Postprocedure Note    Patient: Michelle Le  MRN: [id number]  YOB: 1960  Date of evaluation: 7/30/2024    Procedure Summary       Date: 07/30/24 Room / Location: [address]    Anesthesia Start: 0727 Anesthesia Stop:     Procedure: Ultrasound-guided right internal jugular Kenyon catheter placement (Right: Neck) Diagnosis:       Lack of intravenous access      (Lack of intravenous access [Z78.9])    Surgeons: Joss Denise MD Responsible Provider: Donnie Perez MD    Anesthesia Type: general ASA Status: 4            Anesthesia Type: No value filed.    Gaby Phase I: Gaby Score: 10    Gaby Phase II:      Anesthesia Post Evaluation    Patient location during evaluation: ICU  Patient participation: complete - patient cannot participate  Level of consciousness: awake  Pain score: 0  Airway patency: patent  Nausea & Vomiting: no vomiting and no nausea  Cardiovascular status: blood pressure returned to baseline  Respiratory status: acceptable  Hydration status: stable  Pain management: adequate        No notable events documented.

## 2024-07-31 ENCOUNTER — APPOINTMENT (OUTPATIENT)
Age: 64
End: 2024-07-31
Payer: MEDICARE

## 2024-07-31 ENCOUNTER — TELEPHONE (OUTPATIENT)
Dept: INFECTIOUS DISEASES | Age: 64
End: 2024-07-31

## 2024-07-31 ENCOUNTER — APPOINTMENT (OUTPATIENT)
Dept: ULTRASOUND IMAGING | Age: 64
End: 2024-07-31
Payer: MEDICARE

## 2024-07-31 ENCOUNTER — APPOINTMENT (OUTPATIENT)
Dept: CT IMAGING | Age: 64
End: 2024-07-31
Payer: MEDICARE

## 2024-07-31 ENCOUNTER — APPOINTMENT (OUTPATIENT)
Dept: GENERAL RADIOLOGY | Age: 64
End: 2024-07-31
Payer: MEDICARE

## 2024-07-31 ENCOUNTER — APPOINTMENT (OUTPATIENT)
Dept: INTERVENTIONAL RADIOLOGY/VASCULAR | Age: 64
End: 2024-07-31
Payer: MEDICARE

## 2024-07-31 PROBLEM — N18.9 CHRONIC RENAL FAILURE: Status: ACTIVE | Noted: 2020-06-28

## 2024-07-31 PROBLEM — T82.838A HEMORRHAGE OF ARTERIOVENOUS FISTULA (HCC): Status: ACTIVE | Noted: 2024-07-31

## 2024-07-31 PROBLEM — D50.0 BLOOD LOSS ANEMIA: Status: ACTIVE | Noted: 2024-07-27

## 2024-07-31 PROBLEM — K76.1 CARDIAC CIRRHOSIS: Status: ACTIVE | Noted: 2024-04-24

## 2024-07-31 LAB
ABO + RH BLD: NORMAL
ALBUMIN SERPL-MCNC: 4.1 G/DL (ref 3.5–4.6)
ALBUMIN SERPL-MCNC: 4.2 G/DL (ref 3.5–4.6)
ALP SERPL-CCNC: 72 U/L (ref 40–130)
ALT SERPL-CCNC: 7 U/L (ref 0–33)
ANION GAP SERPL CALCULATED.3IONS-SCNC: 19 MEQ/L (ref 9–15)
ANION GAP SERPL CALCULATED.3IONS-SCNC: 22 MEQ/L (ref 9–15)
ANION GAP SERPL CALCULATED.3IONS-SCNC: 24 MEQ/L (ref 9–15)
APTT PPP: 45.8 SEC (ref 24.4–36.8)
AST SERPL-CCNC: 14 U/L (ref 0–35)
BASE EXCESS ARTERIAL: -10 (ref -3–3)
BASE EXCESS ARTERIAL: -3 (ref -3–3)
BASOPHILS # BLD: 0.1 K/UL (ref 0–0.2)
BASOPHILS NFR BLD: 0.5 %
BILIRUB DIRECT SERPL-MCNC: 0.9 MG/DL (ref 0–0.4)
BILIRUB INDIRECT SERPL-MCNC: 0.5 MG/DL (ref 0–0.6)
BILIRUB SERPL-MCNC: 1.4 MG/DL (ref 0.2–0.7)
BLD GP AB SCN SERPL QL: NORMAL
BUN SERPL-MCNC: 39 MG/DL (ref 8–23)
BUN SERPL-MCNC: 41 MG/DL (ref 8–23)
BUN SERPL-MCNC: 46 MG/DL (ref 8–23)
CALCIUM IONIZED: 1.18 MMOL/L (ref 1.12–1.32)
CALCIUM IONIZED: 1.3 MMOL/L (ref 1.12–1.32)
CALCIUM SERPL-MCNC: 10.1 MG/DL (ref 8.5–9.9)
CALCIUM SERPL-MCNC: 10.1 MG/DL (ref 8.5–9.9)
CALCIUM SERPL-MCNC: 9.6 MG/DL (ref 8.5–9.9)
CHLORIDE SERPL-SCNC: 89 MEQ/L (ref 95–107)
CHLORIDE SERPL-SCNC: 90 MEQ/L (ref 95–107)
CHLORIDE SERPL-SCNC: 92 MEQ/L (ref 95–107)
CO2 SERPL-SCNC: 17 MEQ/L (ref 20–31)
CO2 SERPL-SCNC: 19 MEQ/L (ref 20–31)
CO2 SERPL-SCNC: 19 MEQ/L (ref 20–31)
CREAT SERPL-MCNC: 5.62 MG/DL (ref 0.5–0.9)
CREAT SERPL-MCNC: 6.55 MG/DL (ref 0.5–0.9)
CREAT SERPL-MCNC: 6.82 MG/DL (ref 0.5–0.9)
EOSINOPHIL # BLD: 0.5 K/UL (ref 0–0.7)
EOSINOPHIL NFR BLD: 4.2 %
ERYTHROCYTE [DISTWIDTH] IN BLOOD BY AUTOMATED COUNT: 19.9 % (ref 11.5–14.5)
GLUCOSE BLD-MCNC: 146 MG/DL (ref 70–99)
GLUCOSE BLD-MCNC: 162 MG/DL (ref 70–99)
GLUCOSE BLD-MCNC: 172 MG/DL (ref 70–99)
GLUCOSE BLD-MCNC: 192 MG/DL (ref 70–99)
GLUCOSE BLD-MCNC: 197 MG/DL (ref 70–99)
GLUCOSE SERPL-MCNC: 153 MG/DL (ref 70–99)
GLUCOSE SERPL-MCNC: 170 MG/DL (ref 70–99)
GLUCOSE SERPL-MCNC: 192 MG/DL (ref 70–99)
HCO3 ARTERIAL: 16 MMOL/L (ref 21–29)
HCO3 ARTERIAL: 20.1 MMOL/L (ref 21–29)
HCT VFR BLD AUTO: 20 % (ref 37–47)
HCT VFR BLD AUTO: 22.1 % (ref 37–47)
HCT VFR BLD AUTO: 23 % (ref 36–48)
HCT VFR BLD AUTO: 25 % (ref 36–48)
HCT VFR BLD AUTO: 26 % (ref 37–47)
HGB BLD CALC-MCNC: 7.7 GM/DL (ref 12–16)
HGB BLD CALC-MCNC: 8.6 GM/DL (ref 12–16)
HGB BLD-MCNC: 6.5 G/DL (ref 12–16)
HGB BLD-MCNC: 7.5 G/DL (ref 12–16)
HGB BLD-MCNC: 8.9 G/DL (ref 12–16)
INR PPP: 2.2
LACTATE BLDV-SCNC: 1.7 MMOL/L (ref 0.5–2.2)
LACTATE: 1.35 MMOL/L (ref 0.4–2)
LACTATE: 6.88 MMOL/L (ref 0.4–2)
LYMPHOCYTES # BLD: 1.9 K/UL (ref 1–4.8)
LYMPHOCYTES NFR BLD: 17 %
MAGNESIUM SERPL-MCNC: 1.8 MG/DL (ref 1.7–2.4)
MAGNESIUM SERPL-MCNC: 1.9 MG/DL (ref 1.7–2.4)
MAGNESIUM SERPL-MCNC: 1.9 MG/DL (ref 1.7–2.4)
MCH RBC QN AUTO: 29.7 PG (ref 27–31.3)
MCHC RBC AUTO-ENTMCNC: 32.5 % (ref 33–37)
MCV RBC AUTO: 91.3 FL (ref 79.4–94.8)
MONOCYTES # BLD: 0.9 K/UL (ref 0.2–0.8)
MONOCYTES NFR BLD: 8 %
NEUTROPHILS # BLD: 7.6 K/UL (ref 1.4–6.5)
NEUTS SEG NFR BLD: 69.6 %
O2 SAT, ARTERIAL: 100 % (ref 93–100)
O2 SAT, ARTERIAL: 100 % (ref 93–100)
PATH CONSULT FLUID: NORMAL
PCO2 ARTERIAL: 26 MM HG (ref 35–45)
PCO2 ARTERIAL: 32 MM HG (ref 35–45)
PERFORMED ON: ABNORMAL
PH ARTERIAL: 7.31 (ref 7.35–7.45)
PH ARTERIAL: 7.5 (ref 7.35–7.45)
PHOSPHATE SERPL-MCNC: 3.7 MG/DL (ref 2.3–4.8)
PHOSPHATE SERPL-MCNC: 5.3 MG/DL (ref 2.3–4.8)
PHOSPHATE SERPL-MCNC: 5.3 MG/DL (ref 2.3–4.8)
PLATELET # BLD AUTO: 170 K/UL (ref 130–400)
PO2 ARTERIAL: 236 MM HG (ref 75–108)
PO2 ARTERIAL: 302 MM HG (ref 75–108)
POC CHLORIDE: 94 MEQ/L (ref 99–110)
POC CHLORIDE: 97 MEQ/L (ref 99–110)
POC CREATININE: 4.9 MG/DL (ref 0.6–1.2)
POC CREATININE: 6.2 MG/DL (ref 0.6–1.2)
POC FIO2: 100
POC FIO2: 50
POC SAMPLE TYPE: ABNORMAL
POC SAMPLE TYPE: ABNORMAL
POTASSIUM SERPL-SCNC: 3.8 MEQ/L (ref 3.5–5.1)
POTASSIUM SERPL-SCNC: 4.2 MEQ/L (ref 3.4–4.9)
POTASSIUM SERPL-SCNC: 4.7 MEQ/L (ref 3.4–4.9)
POTASSIUM SERPL-SCNC: 4.8 MEQ/L (ref 3.4–4.9)
POTASSIUM SERPL-SCNC: 5.2 MEQ/L (ref 3.5–5.1)
PROT SERPL-MCNC: 5.7 G/DL (ref 6.3–8)
PROTHROMBIN TIME: 24.1 SEC (ref 12.3–14.9)
RBC # BLD AUTO: 2.19 M/UL (ref 4.2–5.4)
SODIUM BLD-SCNC: 124 MEQ/L (ref 136–145)
SODIUM BLD-SCNC: 127 MEQ/L (ref 136–145)
SODIUM SERPL-SCNC: 130 MEQ/L (ref 135–144)
SODIUM SERPL-SCNC: 130 MEQ/L (ref 135–144)
SODIUM SERPL-SCNC: 131 MEQ/L (ref 135–144)
TCO2 ARTERIAL: 17 MMOL/L (ref 21–32)
TCO2 ARTERIAL: 21 MMOL/L (ref 21–32)
TRIGL SERPL-MCNC: 151 MG/DL (ref 0–150)
WBC # BLD AUTO: 11 K/UL (ref 4.8–10.8)

## 2024-07-31 PROCEDURE — 80076 HEPATIC FUNCTION PANEL: CPT

## 2024-07-31 PROCEDURE — 2580000003 HC RX 258: Performed by: STUDENT IN AN ORGANIZED HEALTH CARE EDUCATION/TRAINING PROGRAM

## 2024-07-31 PROCEDURE — 36556 INSERT NON-TUNNEL CV CATH: CPT | Performed by: INTERNAL MEDICINE

## 2024-07-31 PROCEDURE — 6370000000 HC RX 637 (ALT 250 FOR IP): Performed by: INTERNAL MEDICINE

## 2024-07-31 PROCEDURE — 99223 1ST HOSP IP/OBS HIGH 75: CPT | Performed by: NURSE PRACTITIONER

## 2024-07-31 PROCEDURE — 84478 ASSAY OF TRIGLYCERIDES: CPT

## 2024-07-31 PROCEDURE — 2000000000 HC ICU R&B

## 2024-07-31 PROCEDURE — 36907 BALO ANGIOP CTR DIALYSIS SEG: CPT

## 2024-07-31 PROCEDURE — 36905 THRMBC/NFS DIALYSIS CIRCUIT: CPT

## 2024-07-31 PROCEDURE — 2500000003 HC RX 250 WO HCPCS: Performed by: NURSE PRACTITIONER

## 2024-07-31 PROCEDURE — 6360000002 HC RX W HCPCS: Performed by: STUDENT IN AN ORGANIZED HEALTH CARE EDUCATION/TRAINING PROGRAM

## 2024-07-31 PROCEDURE — 36556 INSERT NON-TUNNEL CV CATH: CPT

## 2024-07-31 PROCEDURE — 2500000003 HC RX 250 WO HCPCS: Performed by: COLON & RECTAL SURGERY

## 2024-07-31 PROCEDURE — 31500 INSERT EMERGENCY AIRWAY: CPT | Performed by: INTERNAL MEDICINE

## 2024-07-31 PROCEDURE — 93306 TTE W/DOPPLER COMPLETE: CPT

## 2024-07-31 PROCEDURE — 82948 REAGENT STRIP/BLOOD GLUCOSE: CPT

## 2024-07-31 PROCEDURE — 85730 THROMBOPLASTIN TIME PARTIAL: CPT

## 2024-07-31 PROCEDURE — 83735 ASSAY OF MAGNESIUM: CPT

## 2024-07-31 PROCEDURE — 85610 PROTHROMBIN TIME: CPT

## 2024-07-31 PROCEDURE — A4217 STERILE WATER/SALINE, 500 ML: HCPCS | Performed by: RADIOLOGY

## 2024-07-31 PROCEDURE — 5A1955Z RESPIRATORY VENTILATION, GREATER THAN 96 CONSECUTIVE HOURS: ICD-10-PCS | Performed by: STUDENT IN AN ORGANIZED HEALTH CARE EDUCATION/TRAINING PROGRAM

## 2024-07-31 PROCEDURE — 82330 ASSAY OF CALCIUM: CPT

## 2024-07-31 PROCEDURE — 90945 DIALYSIS ONE EVALUATION: CPT

## 2024-07-31 PROCEDURE — P9016 RBC LEUKOCYTES REDUCED: HCPCS

## 2024-07-31 PROCEDURE — 86923 COMPATIBILITY TEST ELECTRIC: CPT

## 2024-07-31 PROCEDURE — B51W1ZZ FLUOROSCOPY OF DIALYSIS SHUNT/FISTULA USING LOW OSMOLAR CONTRAST: ICD-10-PCS | Performed by: RADIOLOGY

## 2024-07-31 PROCEDURE — 86900 BLOOD TYPING SEROLOGIC ABO: CPT

## 2024-07-31 PROCEDURE — 70450 CT HEAD/BRAIN W/O DYE: CPT

## 2024-07-31 PROCEDURE — 5A1955Z RESPIRATORY VENTILATION, GREATER THAN 96 CONSECUTIVE HOURS: ICD-10-PCS

## 2024-07-31 PROCEDURE — 6370000000 HC RX 637 (ALT 250 FOR IP): Performed by: COLON & RECTAL SURGERY

## 2024-07-31 PROCEDURE — 85018 HEMOGLOBIN: CPT

## 2024-07-31 PROCEDURE — 37799 UNLISTED PX VASCULAR SURGERY: CPT

## 2024-07-31 PROCEDURE — 93990 DOPPLER FLOW TESTING: CPT

## 2024-07-31 PROCEDURE — 36415 COLL VENOUS BLD VENIPUNCTURE: CPT

## 2024-07-31 PROCEDURE — 06HY33Z INSERTION OF INFUSION DEVICE INTO LOWER VEIN, PERCUTANEOUS APPROACH: ICD-10-PCS

## 2024-07-31 PROCEDURE — 36620 INSERTION CATHETER ARTERY: CPT | Performed by: INTERNAL MEDICINE

## 2024-07-31 PROCEDURE — 82435 ASSAY OF BLOOD CHLORIDE: CPT

## 2024-07-31 PROCEDURE — 6360000002 HC RX W HCPCS: Performed by: INTERNAL MEDICINE

## 2024-07-31 PROCEDURE — 06HY33Z INSERTION OF INFUSION DEVICE INTO LOWER VEIN, PERCUTANEOUS APPROACH: ICD-10-PCS | Performed by: INTERNAL MEDICINE

## 2024-07-31 PROCEDURE — 36620 INSERTION CATHETER ARTERY: CPT

## 2024-07-31 PROCEDURE — 6360000002 HC RX W HCPCS: Performed by: RADIOLOGY

## 2024-07-31 PROCEDURE — 2580000003 HC RX 258: Performed by: COLON & RECTAL SURGERY

## 2024-07-31 PROCEDURE — 36430 TRANSFUSION BLD/BLD COMPNT: CPT

## 2024-07-31 PROCEDURE — 76937 US GUIDE VASCULAR ACCESS: CPT | Performed by: INTERNAL MEDICINE

## 2024-07-31 PROCEDURE — 2580000003 HC RX 258: Performed by: ANESTHESIOLOGY

## 2024-07-31 PROCEDURE — 85025 COMPLETE CBC W/AUTO DIFF WBC: CPT

## 2024-07-31 PROCEDURE — 99291 CRITICAL CARE FIRST HOUR: CPT | Performed by: INTERNAL MEDICINE

## 2024-07-31 PROCEDURE — 94002 VENT MGMT INPAT INIT DAY: CPT

## 2024-07-31 PROCEDURE — 31500 INSERT EMERGENCY AIRWAY: CPT

## 2024-07-31 PROCEDURE — 82803 BLOOD GASES ANY COMBINATION: CPT

## 2024-07-31 PROCEDURE — 83605 ASSAY OF LACTIC ACID: CPT

## 2024-07-31 PROCEDURE — 6360000002 HC RX W HCPCS: Performed by: COLON & RECTAL SURGERY

## 2024-07-31 PROCEDURE — 2580000003 HC RX 258: Performed by: NURSE PRACTITIONER

## 2024-07-31 PROCEDURE — 86850 RBC ANTIBODY SCREEN: CPT

## 2024-07-31 PROCEDURE — 84295 ASSAY OF SERUM SODIUM: CPT

## 2024-07-31 PROCEDURE — 80069 RENAL FUNCTION PANEL: CPT

## 2024-07-31 PROCEDURE — 2709999900 IR FISTULAGRAM

## 2024-07-31 PROCEDURE — 71045 X-RAY EXAM CHEST 1 VIEW: CPT

## 2024-07-31 PROCEDURE — 6360000002 HC RX W HCPCS: Performed by: NURSE PRACTITIONER

## 2024-07-31 PROCEDURE — 84100 ASSAY OF PHOSPHORUS: CPT

## 2024-07-31 PROCEDURE — 0BH17EZ INSERTION OF ENDOTRACHEAL AIRWAY INTO TRACHEA, VIA NATURAL OR ARTIFICIAL OPENING: ICD-10-PCS | Performed by: INTERNAL MEDICINE

## 2024-07-31 PROCEDURE — 2580000003 HC RX 258: Performed by: RADIOLOGY

## 2024-07-31 PROCEDURE — 93005 ELECTROCARDIOGRAM TRACING: CPT | Performed by: INTERNAL MEDICINE

## 2024-07-31 PROCEDURE — 84132 ASSAY OF SERUM POTASSIUM: CPT

## 2024-07-31 PROCEDURE — 86901 BLOOD TYPING SEROLOGIC RH(D): CPT

## 2024-07-31 PROCEDURE — 82565 ASSAY OF CREATININE: CPT

## 2024-07-31 PROCEDURE — 36600 WITHDRAWAL OF ARTERIAL BLOOD: CPT

## 2024-07-31 PROCEDURE — 85014 HEMATOCRIT: CPT

## 2024-07-31 RX ORDER — MAGNESIUM HYDROXIDE 1200 MG/15ML
LIQUID ORAL CONTINUOUS PRN
Status: COMPLETED | OUTPATIENT
Start: 2024-07-31 | End: 2024-07-31

## 2024-07-31 RX ORDER — SODIUM CHLORIDE 9 MG/ML
INJECTION, SOLUTION INTRAVENOUS
Status: DISPENSED
Start: 2024-07-31 | End: 2024-07-31

## 2024-07-31 RX ORDER — SODIUM CHLORIDE 9 MG/ML
INJECTION, SOLUTION INTRAVENOUS PRN
Status: DISCONTINUED | OUTPATIENT
Start: 2024-07-31 | End: 2024-08-03

## 2024-07-31 RX ORDER — ALBUMIN (HUMAN) 12.5 G/50ML
50 SOLUTION INTRAVENOUS EVERY 8 HOURS
Status: DISCONTINUED | OUTPATIENT
Start: 2024-07-31 | End: 2024-07-31

## 2024-07-31 RX ORDER — ONDANSETRON 4 MG/1
4 TABLET, ORALLY DISINTEGRATING ORAL EVERY 8 HOURS PRN
Status: DISCONTINUED | OUTPATIENT
Start: 2024-07-31 | End: 2024-07-31

## 2024-07-31 RX ORDER — HEPARIN SODIUM 1000 [USP'U]/ML
INJECTION, SOLUTION INTRAVENOUS; SUBCUTANEOUS PRN
Status: COMPLETED | OUTPATIENT
Start: 2024-07-31 | End: 2024-07-31

## 2024-07-31 RX ORDER — INSULIN LISPRO 100 [IU]/ML
0-16 INJECTION, SOLUTION INTRAVENOUS; SUBCUTANEOUS EVERY 4 HOURS
Status: DISCONTINUED | OUTPATIENT
Start: 2024-07-31 | End: 2024-08-08 | Stop reason: HOSPADM

## 2024-07-31 RX ORDER — CHLORHEXIDINE GLUCONATE ORAL RINSE 1.2 MG/ML
15 SOLUTION DENTAL 2 TIMES DAILY
Status: DISCONTINUED | OUTPATIENT
Start: 2024-07-31 | End: 2024-08-08 | Stop reason: HOSPADM

## 2024-07-31 RX ORDER — ONDANSETRON 2 MG/ML
4 INJECTION INTRAMUSCULAR; INTRAVENOUS EVERY 6 HOURS PRN
Status: DISCONTINUED | OUTPATIENT
Start: 2024-07-31 | End: 2024-07-31

## 2024-07-31 RX ORDER — 0.9 % SODIUM CHLORIDE 0.9 %
INTRAVENOUS SOLUTION INTRAVENOUS CONTINUOUS PRN
Status: COMPLETED | OUTPATIENT
Start: 2024-07-31 | End: 2024-07-31

## 2024-07-31 RX ORDER — FENTANYL CITRATE-0.9 % NACL/PF 20 MCG/2ML
50 SYRINGE (ML) INTRAVENOUS EVERY 30 MIN PRN
Status: DISCONTINUED | OUTPATIENT
Start: 2024-07-31 | End: 2024-08-04

## 2024-07-31 RX ORDER — FENTANYL CITRATE-0.9 % NACL/PF 10 MCG/ML
25-300 PLASTIC BAG, INJECTION (ML) INTRAVENOUS CONTINUOUS
Status: DISCONTINUED | OUTPATIENT
Start: 2024-07-31 | End: 2024-08-04

## 2024-07-31 RX ORDER — FENTANYL CITRATE 50 UG/ML
INJECTION, SOLUTION INTRAMUSCULAR; INTRAVENOUS PRN
Status: COMPLETED | OUTPATIENT
Start: 2024-07-31 | End: 2024-07-31

## 2024-07-31 RX ORDER — EPINEPHRINE IN SOD CHLOR,ISO 1 MG/10 ML
SYRINGE (ML) INTRAVENOUS
Status: COMPLETED | OUTPATIENT
Start: 2024-07-31 | End: 2024-07-31

## 2024-07-31 RX ORDER — PROPOFOL 10 MG/ML
INJECTION, EMULSION INTRAVENOUS CONTINUOUS PRN
Status: COMPLETED | OUTPATIENT
Start: 2024-07-31 | End: 2024-07-31

## 2024-07-31 RX ORDER — HEPARIN SODIUM 5000 [USP'U]/ML
2500 INJECTION, SOLUTION INTRAVENOUS; SUBCUTANEOUS PRN
Status: DISCONTINUED | OUTPATIENT
Start: 2024-07-31 | End: 2024-08-04

## 2024-07-31 RX ORDER — HEPARIN SODIUM 1000 [USP'U]/ML
INJECTION, SOLUTION INTRAVENOUS; SUBCUTANEOUS PRN
Status: DISCONTINUED | OUTPATIENT
Start: 2024-07-31 | End: 2024-08-04

## 2024-07-31 RX ORDER — PROPOFOL 10 MG/ML
5-50 INJECTION, EMULSION INTRAVENOUS CONTINUOUS
Status: DISCONTINUED | OUTPATIENT
Start: 2024-07-31 | End: 2024-08-04

## 2024-07-31 RX ORDER — IPRATROPIUM BROMIDE AND ALBUTEROL SULFATE 2.5; .5 MG/3ML; MG/3ML
1 SOLUTION RESPIRATORY (INHALATION) EVERY 4 HOURS PRN
Status: DISCONTINUED | OUTPATIENT
Start: 2024-07-31 | End: 2024-08-08 | Stop reason: HOSPADM

## 2024-07-31 RX ADMIN — ARIPIPRAZOLE 5 MG: 2 TABLET ORAL at 08:42

## 2024-07-31 RX ADMIN — Medication 10 ML: at 08:49

## 2024-07-31 RX ADMIN — DOCUSATE SODIUM 100 MG: 100 CAPSULE, LIQUID FILLED ORAL at 08:46

## 2024-07-31 RX ADMIN — Medication: at 23:45

## 2024-07-31 RX ADMIN — Medication: at 18:59

## 2024-07-31 RX ADMIN — SERTRALINE HYDROCHLORIDE 50 MG: 50 TABLET ORAL at 08:43

## 2024-07-31 RX ADMIN — HYDROCORTISONE 5 MG: 10 TABLET ORAL at 19:55

## 2024-07-31 RX ADMIN — ONDANSETRON 4 MG: 2 INJECTION INTRAMUSCULAR; INTRAVENOUS at 08:07

## 2024-07-31 RX ADMIN — FLUDROCORTISONE ACETATE 0.1 MG: 0.1 TABLET ORAL at 19:56

## 2024-07-31 RX ADMIN — AMOXICILLIN 500 MG: 500 CAPSULE ORAL at 20:47

## 2024-07-31 RX ADMIN — AMOXICILLIN 500 MG: 500 CAPSULE ORAL at 08:43

## 2024-07-31 RX ADMIN — MICONAZOLE NITRATE: 2 POWDER TOPICAL at 20:44

## 2024-07-31 RX ADMIN — CHLORHEXIDINE GLUCONATE 15 ML: 1.2 RINSE ORAL at 19:54

## 2024-07-31 RX ADMIN — MIDODRINE HYDROCHLORIDE 20 MG: 10 TABLET ORAL at 19:55

## 2024-07-31 RX ADMIN — PROPOFOL 1940 MCG: 10 INJECTION, EMULSION INTRAVENOUS at 14:28

## 2024-07-31 RX ADMIN — SODIUM CHLORIDE 1000 ML: 900 IRRIGANT IRRIGATION at 13:30

## 2024-07-31 RX ADMIN — EPINEPHRINE 1 MG: 0.1 INJECTION INTRACARDIAC; INTRAVENOUS at 14:22

## 2024-07-31 RX ADMIN — Medication 10 ML: at 20:45

## 2024-07-31 RX ADMIN — MIDODRINE HYDROCHLORIDE 20 MG: 10 TABLET ORAL at 08:40

## 2024-07-31 RX ADMIN — HEPARIN SODIUM 1300 UNITS: 1000 INJECTION INTRAVENOUS; SUBCUTANEOUS at 16:58

## 2024-07-31 RX ADMIN — HEPARIN SODIUM 1000 UNITS: 1000 INJECTION INTRAVENOUS; SUBCUTANEOUS at 13:30

## 2024-07-31 RX ADMIN — SODIUM CHLORIDE 250 ML: 9 INJECTION, SOLUTION INTRAVENOUS at 13:22

## 2024-07-31 RX ADMIN — EPOETIN ALFA-EPBX 8000 UNITS: 4000 INJECTION, SOLUTION INTRAVENOUS; SUBCUTANEOUS at 20:03

## 2024-07-31 RX ADMIN — HYDROCORTISONE 10 MG: 10 TABLET ORAL at 08:42

## 2024-07-31 RX ADMIN — PROPOFOL 45 MCG/KG/MIN: 10 INJECTION, EMULSION INTRAVENOUS at 22:47

## 2024-07-31 RX ADMIN — INSULIN GLARGINE 35 UNITS: 100 INJECTION, SOLUTION SUBCUTANEOUS at 20:43

## 2024-07-31 RX ADMIN — Medication 2000 UNITS: at 08:46

## 2024-07-31 RX ADMIN — PROPOFOL 40 MCG/KG/MIN: 10 INJECTION, EMULSION INTRAVENOUS at 17:34

## 2024-07-31 RX ADMIN — ACETAMINOPHEN 325MG 650 MG: 325 TABLET ORAL at 00:41

## 2024-07-31 RX ADMIN — FLUDROCORTISONE ACETATE 0.1 MG: 0.1 TABLET ORAL at 08:41

## 2024-07-31 RX ADMIN — Medication 6 MCG/MIN: at 01:38

## 2024-07-31 RX ADMIN — Medication: at 23:51

## 2024-07-31 RX ADMIN — HEPARIN SODIUM 1200 UNITS: 1000 INJECTION INTRAVENOUS; SUBCUTANEOUS at 16:55

## 2024-07-31 RX ADMIN — NEPHROCAP 1 MG: 1 CAP ORAL at 08:46

## 2024-07-31 RX ADMIN — FENTANYL CITRATE 25 MCG: 50 INJECTION, SOLUTION INTRAMUSCULAR; INTRAVENOUS at 13:57

## 2024-07-31 RX ADMIN — PANTOPRAZOLE SODIUM 20 MG: 20 TABLET, DELAYED RELEASE ORAL at 08:46

## 2024-07-31 RX ADMIN — MICONAZOLE NITRATE: 2 POWDER TOPICAL at 08:49

## 2024-07-31 RX ADMIN — Medication 50 MCG/HR: at 16:45

## 2024-07-31 ASSESSMENT — PULMONARY FUNCTION TESTS
PIF_VALUE: 32
PIF_VALUE: 21
PIF_VALUE: 30
PIF_VALUE: 32
PIF_VALUE: 30
PIF_VALUE: 30
PIF_VALUE: 33
PIF_VALUE: 29
PIF_VALUE: 33
PIF_VALUE: 35
PIF_VALUE: 36
PIF_VALUE: 29
PIF_VALUE: 29
PIF_VALUE: 33
PIF_VALUE: 28
PIF_VALUE: 31
PIF_VALUE: 30
PIF_VALUE: 39
PIF_VALUE: 29
PIF_VALUE: 31
PIF_VALUE: 36
PIF_VALUE: 33
PIF_VALUE: 29
PIF_VALUE: 26

## 2024-07-31 ASSESSMENT — PAIN SCALES - GENERAL
PAINLEVEL_OUTOF10: 5
PAINLEVEL_OUTOF10: 0
PAINLEVEL_OUTOF10: 0

## 2024-07-31 ASSESSMENT — PAIN SCALES - WONG BAKER: WONGBAKER_NUMERICALRESPONSE: NO HURT

## 2024-07-31 ASSESSMENT — PAIN DESCRIPTION - LOCATION: LOCATION: ARM

## 2024-07-31 ASSESSMENT — PAIN DESCRIPTION - DESCRIPTORS: DESCRIPTORS: ACHING

## 2024-07-31 NOTE — PROGRESS NOTES
LUE Fistulogram.      Prior to procedure a pursestring suture was placed at what had the appearance of ulcerated access site which was actively bleeding, spurting when dressing removed,  when patient came into room ,  2-0 prolene suture placed and hemostasis achieved.    Venous outflow occlusion seen on fistulagram.  & mm angio was performed at point of occlusion when patients BP and HR and Sat decreased. Procedure stopped.  Code initiated .  1 round of compressions performed and pt was intubated .  ROSC achieved and Sat returned to 90 and 's .  Pt returned to ICU.

## 2024-07-31 NOTE — FLOWSHEET NOTE
2030: Assumed responsibility of the patient, SBAR report given by Ying HUDDLESTON. Safety checks performed, central line and gtt verified by the RN. Patient needs addressed at this time.     2100: Initial encounter assessment completed. The patient is drowsy due to anesthesia given during her procedure today. The patient had a double lumen Kenyon catheter placed. The nurse assessed the site, the site is dry and clean. There are no signs of bleeding or displacement. There are multiple failed sites used during the procedure which are ecchymotic but show no indications of active bleeding. The patient has a skin tear on her left forearm. The nurse assessed and cleaned the site. The nurse applied xeroform, gauze, and kerlix. The patient is now resting in bed.     2256: the nurse adjusted the patient's levophed gtt to 6 mcg/min per administration protocol.     0044: tylenol given for complaints of pain in the arms bilaterally.     0145: the nurse started a new bag of levophed at 6 mcg/min per protocol. The patient's SBP is maintaining between 100 - 120. The nurse will continue to monitor.     0159: the nurse titrated levophed down by 1 mcg/min per protocol. BP currently 128/54 MAP 74. The nurse will continue to monitor.     0401: The nurse titrated the levophed gtt down to 4 mcg/min per protocol. The patient's BP is 118/54 MAP 74. The nurse will continue to monitor the patient.     0600: Labs drawn by the nurse and sent to lab. Awaiting results.     0630: Lab results came back critical for H&H at 6.5 and 20 respectively. The nurse notified Dr. Stoner and Dr. Anderson. Dr. Anderson will review the patient's chart.

## 2024-07-31 NOTE — PROCEDURES
PROCEDURE NOTE  Date: 7/31/2024   Name: Michelle Le  YOB: 1960    Intubation    Date/Time: 7/31/2024 3:11 PM    Performed by: Khalif Mir DO  Authorized by: Khalif Mir DO  Consent: Verbal consent not obtained. Written consent not obtained.  Indications: respiratory distress and airway protection  Intubation method: video-assisted  Patient status: unconscious  Preoxygenation: nonrebreather mask  Pretreatment medications: none  Paralytic: none  Tube size: 7.5 mm  Tube type: cuffed  Number of attempts: 2  Ventilation between attempts: BVM  Cricoid pressure: no  Cords visualized: yes  Post-procedure assessment: chest rise, ETCO2 monitor and CO2 detector  Breath sounds: equal  Cuff inflated: yes  Tube secured with: ETT brantley  Chest x-ray interpreted by me.  Chest x-ray findings: endotracheal tube in appropriate position

## 2024-07-31 NOTE — PROGRESS NOTES
Pulmonary & Critical Care Medicine ICU Progress Note  Chief complaint : Hypotension     Subjunctive/24 hour events :   Patient seen and examined during multidisciplinary rounds with RN, charge nurse, RT, pharmacy, dietitian, and social service.   Patient had an uneventful night. She is on room air and O2 sats are 100%. Remains on levophed at 4 mcg/min. Received dialysis yesterday with no fluid removal. Paracentesis at the bedside and 4.5 liters was removed. No fever overnight and anuric. Tolerating a po diet and  last BM 7/29/2024.        Social History     Tobacco Use    Smoking status: Never     Passive exposure: Never    Smokeless tobacco: Never   Substance Use Topics    Alcohol use: No     Alcohol/week: 0.0 standard drinks of alcohol         Problem Relation Age of Onset    Cancer Mother 68        survived    Breast Cancer Mother     Hypertension Father     Diabetes Sister     Mental Illness Sister     Colon Cancer Neg Hx        No results for input(s): \"PHART\", \"EIZ4QTG\", \"PO2ART\" in the last 72 hours.    MV Settings:     / / /            IV:   sodium chloride      sodium chloride      sodium chloride      norepinephrine 4 mcg/min (07/31/24 0850)    sodium chloride      dextrose         Vitals:  BP (!) 93/53   Pulse 80   Temp 97.8 °F (36.6 °C) (Oral)   Resp 16   Ht 1.727 m (5' 8\")   Wt 97 kg (213 lb 14.4 oz)   LMP  (LMP Unknown)   SpO2 96%   BMI 32.52 kg/m²    Tmax:       Intake/Output Summary (Last 24 hours) at 7/31/2024 0854  Last data filed at 7/31/2024 0850  Gross per 24 hour   Intake 752.31 ml   Output --   Net 752.31 ml         EXAM:    General: alert, cooperative  Head: normocephalic, atraumatic  Eyes:No gross abnormalities.  ENT:  MMM no lesions  Neck:  supple and no masses  Chest : Fair air movement no rales no wheezes   Heart:: Heart sounds are normal.  Regular rate and rhythm without murmur, gallop or rub.  ABD:  bowel sounds normal, soft, non-tender  Musculoskeletal : no cyanosis, no  SIGNIFICANT CHANGE SINCE THE PREVIOUS EXAM. THERE HAS BEEN PLACEMENT OF A DIALYSIS CATHETER SINCE THAT EXAM.      Results for orders placed during the hospital encounter of 04/20/22    XR CHEST (2 VW)    Narrative  EXAMINATION: XR CHEST (2 VW)    CLINICAL HISTORY: ABDOMINAL PAIN    COMPARISONS: CHEST RADIOGRAPH APRIL 8, 2022    FINDINGS: Median sternotomy.. Cardiopericardial silhouette upper limits normal, unchanged. Pulmonary vasculature normal. Right lung clear. Blunting left costophrenic angle again identified. Ill-defined area increased opacity lateral left lower chest  decreased since prior study.    Impression  BORDERLINE CARDIOMEGALY. LEFT PLEURAL EFFUSION VERSUS THICKENING. LEFT LOWER LUNG SCARRING VERSUS ATELECTASIS/PNEUMONIA.       Portable: Results for orders placed during the hospital encounter of 04/16/24    XR CHEST PORTABLE    Narrative  EXAMINATION:  ONE XRAY VIEW OF THE CHEST    4/22/2024 10:27 am    COMPARISON:  Chest x-ray from April 20, 2024    HISTORY:  ORDERING SYSTEM PROVIDED HISTORY: intubation/OG tube placement  TECHNOLOGIST PROVIDED HISTORY:  Reason for exam:->intubation/OG tube placement  What reading provider will be dictating this exam?->CRC    FINDINGS:  No change in the arm right subclavian central line.    Status post intubation with the tip of the endotracheal tube approximately 3  cm from the tabitha.    There is an NG tube coursing to the stomach in good position.    The lungs and pleural spaces are without acute focal process.  Low  inspiratory volume study.    The cardiomediastinal silhouette is enlarged but unchanged.    There is no evidence of pneumothorax.    The osseous structures are without acute process.    Impression  No acute process.  Status post intubation, there is an NG tube in good  position.      Echo No results found for this or any previous visit.        Assessment:  This is a critically ill patient at risk of deterioration / death , needing close ICU monitoring  and intervention due to below noted problems   Acute blood loss anemia, bleeding fistula site  Chronic anemia baseline 10.0   ESRD dialysis M-W-F, bleeding fistula   Hypotension, ongoing during dialysis and d/t anemia    Adrenal insuffiencey, on hydrocortisone at home  Ascites   Hyponatremia    Recommendations   Nephrology is following, dialysis M-W-F, unable to dialysis yesterday d/t bleeding fistula   Wean levophed for a SBP >90   Continue Midodrine   Continue hydrocortisone   Continue florinef   Paracentesis yesterday 4.5 liters removed  Fistula gram today with IR and tunneled dialysis cath   Monitor H/H and transfuse if less than 7.0 , receiving one unit of PRBC   DVT Prophylaxis   PUD Prophylaxis   Maintain blood sugar 140-180  Monitor electrolytes and replace as needed   Monitor urine output and avoid overload              Electronically signed by LORETO Ang - CNP,  [facility] ,on 7/31/2024 at 8:54 AM

## 2024-07-31 NOTE — PROGRESS NOTES
CRRT INITIATED USING M150 AS PRESCRIBED. ORDERS AND MACHINE SETTINGS REVIEWED WITH YOMI CORTES RN. PT STABLE AT THIS TIME.

## 2024-07-31 NOTE — DISCHARGE INSTR - COC
Continuity of Care Form    Patient Name: Michelle Le   :  1960  MRN:  [id number]    Admit date:  2024  Discharge date:  ***    Code Status Order: Full Code   Advance Directives:   Advance Care Flowsheet Documentation       Date/Time Healthcare Directive Type of Healthcare Directive Copy in Chart Healthcare Agent Appointed Healthcare Agent's Name Healthcare Agent's Phone Number    [phone] Yes, patient has an advance directive for healthcare treatment Durable power of  for health care -- Adult Children -- --            Admitting Physician:  Donnie De La Vega MD  PCP: Adilene Terry MD    Discharging Nurse: ***  Discharging Hospital Unit/Room#: IC16/IC16-01  Discharging Unit Phone Number: ***    Emergency Contact:   Extended Emergency Contact Information  Primary Emergency Contact: Angelina Fagan   [facility]  Home Phone: [phone]  Work Phone: [phone]  Mobile Phone: [phone]  Relation: Child    Past Surgical History:  Past Surgical History:   Procedure Laterality Date    AV FISTULA CREATION Left 2023    [facility]     SECTION      x1    COLONOSCOPY  2014    Dr. Bello    CORONARY ANGIOPLASTY WITH STENT PLACEMENT      x1 Dr. Walker, Dr Borja 2018    CORONARY ANGIOPLASTY WITH STENT PLACEMENT  08/10/2021    [facility]    DIAGNOSTIC CARDIAC CATH LAB PROCEDURE  10/02/2019    DIALYSIS CATHETER INSERTION Left 2022    Tunneled Symetrex 15.5F x 23cm hemodialysis catheter inserted by Dr. White    DIALYSIS CATHETER INSERTION Left 2022    [id number] replamcent tunneld HD cath by Dr. White    HYSTERECTOMY, TOTAL ABDOMINAL (CERVIX REMOVED)      one ovary intact, Dr Fabian, menorrhagia    IR THROMBECTOMY PERCUT AVF  2022    IR THROMBECTOMY PERCUT AVF 2022 [facility] SPECIAL PROCEDURE    IR TUNNELED CATHETER PLACEMENT GREATER THAN 5 YEARS  2022    IR TUNNELED CATHETER PLACEMENT GREATER THAN 5 YEARS 6/3/2022 [facility] SPECIAL PROCEDURE

## 2024-07-31 NOTE — OR NURSING
SEDATION ADMINISTERED    1309 - Pt arrived to cath lab 1 via bed from inpatient room - ICU #[id number], accompanied by transporter and RN Linda. Manual pressure being held at left arm fistula site - reports it started bleeding on the transport to cath lab. Pt transferred onto procedure table with staff assist x4 and in supine position with minimal assistance, while manual pressure is continuing to be held at fistula site. 2L NC/end tidal CO2 applied. VSS, . Reassurance and support given. Procedure explained and consent signed (by 2 witnessing staff members as patient is unable to sign at this time) for fistulogram with intervention if needed. Left arm positioned out laterally on board.     1322 - Dr. Carmona arrived and visually assessing left arm fistula. Manual pressure released and dressing removed. Area cleansed with Chloraprep.     1328 - Lidocaine 2% administered at site by Dr. Carmona.     1330 - Suture placed at fistula site to temporarily control bleeding.     1332 - Fistula continues to bleed, manual pressure resumed. Additional sutures placed.     1348 - Left arm prepped circumferentially with large tinted chloraprep from wrist to chest per [facility]. After 3 minute dry time, , Rad tech draped site using full body drape in sterile fashion.    Dr. Carmona evaluating LEFT arm using Ultrasound guidance and marked access site.     1356 - Timeout for procedure performed.     1357 - Fentanyl IV administered by RH-RN, independent observer, see eMar, per verbal order.    1358 - Dr. Carmona, using U/S guidance, numbed site using lidocaine 2%, see eMar.    Dr. Carmona, using U/S guidance, accessed site and maintained access using 5F mp introducer set.     1401 - Dr. Carmona then hand injected contrast via transition sheath and fluoro images taken and reviewed.     Dr. Carmona then inserted Angled stiff glidewire with fluoro imaging.    Dr. Carmona exchanged transition sheath for 6F brite tip sheath,  blood aspirated and flushed well.    1405 - Conquest PTA dilatation catheter 7mm x 40mm used with indeflator for one minute intervals at area of stenosis.      1407 - Contrast hand injected and fluoro images obtained.     1409 - Conquest PTA dilatation catheter 7mm x 40mm again used with indeflator for one minute intervals and mechanical thrombectomy at area of stenosis.      1416 - BP and SPO2 dropped, patient became unresponsive. Rapid response called. Refer to code documentation.     1432 - Purse string sutures being placed and sheath removed.     1438 - Transferred to bed and taken to CT for stat scan, then will return to ICU.

## 2024-07-31 NOTE — PROGRESS NOTES
Nephrology Progress Note    Assessment:  64 y.o. year old female who presented to the emergency department for further evaluation and management of sudden onset of AV fistula bleed associated with hypotension with a blood pressure in the 50s admitted to critical care bed for further management though it did take place as per patient it seems like it did take place after pulling the bandage of the dialysis site  This clinical presentation did take place in a patient with past medical history of diabetes hypertension GERD COPD and she is on dialysis Monday Wednesday Friday anemia of chronic kidney disease secondary hyperparathyroidism and hyperphosphatemia     Patient still hypotensive with a blood pressure in the 99/38 no significant tachycardia no clinical or laboratory indication for dialysis  Careful examination of the patient AV fistula is functional left upper arm with felt thrill and heard bruit no evidence of active bleeding patient did improve her hemoglobin after packed RBCs transfusion        Plan:  - getting fistulagram today  - will plan for IHD thereafter as long as remains on low dose levo        Patient Active Problem List:     Coronary artery disease involving native coronary artery of native heart with angina pectoris (HCC)     Schizophrenia, paranoid, chronic     Metabolic syndrome     Vitamin B 12 deficiency     Cerebral microvascular disease     Mixed hyperlipidemia     Other hammer toe (acquired)     Vitamin D insufficiency     Incontinence of urine     Diabetic nephropathy with proteinuria (HCC)     Essential (primary) hypertension     History of type C viral hepatitis     Urinary incontinence due to cognitive impairment     History of seizures     Stented coronary artery-plan is to stay on Plavix indefinately per Dr Walker     Diabetes mellitus (HCC)     Controlled type 2 diabetes mellitus with diabetic neuropathy, with long-term current use of insulin (HCC)     Hemiparesis, left (HCC)      20 mg Oral Daily    Vitamin D  2,000 Units Oral Daily    ARIPiprazole  5 mg Oral Daily    fludrocortisone  0.1 mg Oral BID    sertraline  50 mg Oral Daily    miconazole   Topical BID    epoetin chadwick-epbx  8,000 Units SubCUTAneous Once per day on Mon Wed Fri    sodium chloride flush  5-40 mL IntraVENous 2 times per day    insulin glargine  35 Units SubCUTAneous Nightly    hydrocortisone  5 mg Oral Nightly    hydrocortisone  10 mg Oral Daily    midodrine  20 mg Oral TID     budesonide-formoterol  2 puff Inhalation BID RT    And    tiotropium  2 puff Inhalation Daily RT    pill splitter   Does not apply Once     Continuous Infusions:   sodium chloride      sodium chloride      sodium chloride      sodium chloride      norepinephrine 2 mcg/min (07/31/24 1108)    sodium chloride      dextrose         CBC:   Recent Labs     07/30/24  0500 07/31/24  0600   WBC 13.3* 11.0*   HGB 7.3* 6.5*    170       CMP:    Recent Labs     07/29/24  0525 07/30/24  0500 07/31/24  0600   * 131* 130*   K 4.1 4.4 4.8   CL 95 91* 89*   CO2 26 23 19*   BUN 34* 40* 41*   CREATININE 5.25* 6.18* 6.55*   GLUCOSE 130* 215* 192*   CALCIUM 9.9 10.0* 10.1*   LABGLOM 8.5* 7.0* 6.5*       Troponin: No results for input(s): \"TROPONINI\" in the last 72 hours.  BNP: No results for input(s): \"BNP\" in the last 72 hours.  INR:   No results for input(s): \"INR\" in the last 72 hours.    Lipids: No results for input(s): \"CHOL\", \"LDLDIRECT\", \"TRIG\", \"HDL\", \"AMYLASE\", \"LIPASE\" in the last 72 hours.  Liver:   Recent Labs     07/31/24  1044   AST 14   ALT 7   ALKPHOS 72   BILITOT 1.4*       Iron:    Recent Labs     07/30/24  0500   FERRITIN 1,897*     Urinalysis: No results for input(s): \"UA\" in the last 72 hours.    Objective:  Vitals: BP (!) 121/50   Pulse 77   Temp 97.6 °F (36.4 °C) (Oral)   Resp 19   Ht 1.727 m (5' 8\")   Wt 97 kg (213 lb 14.4 oz)   LMP  (LMP Unknown)   SpO2 97%   BMI 32.52 kg/m²    Wt Readings from Last 3 Encounters:    07/30/24 97 kg (213 lb 14.4 oz)   07/24/24 98.4 kg (217 lb)   07/15/24 98.4 kg (217 lb)      24HR INTAKE/OUTPUT:    Intake/Output Summary (Last 24 hours) at 7/31/2024 1117  Last data filed at 7/31/2024 1108  Gross per 24 hour   Intake 759.99 ml   Output --   Net 759.99 ml         General: alert, in no apparent distress  HEENT: normocephalic, atraumatic, anicteric  Lungs: non-labored respirations, clear to auscultation bilaterally  Heart: regular rate and rhythm, no murmurs or rubs  Abdomen: soft, non-tender, non-distended  Ext: no cyanosis, no peripheral edema  Neuro: alert, follows commands         Electronically signed by Abel Larios MD

## 2024-07-31 NOTE — SIGNIFICANT EVENT
Rapid response called and Cath Lab.  Patient was getting a fistulogram by IR.  She was noted to be hypoxic and hypotensive.  About arrival to Cath Lab her saturation was noted to be in the 60s.  Patient was unresponsive.  Her blood pressure however has improved to 120s or so.  Patient lost her pulse and CPR was started.  She received 1 round of epi.  She got her pulse back.  She was intubated during CPR.  Stat chest x-ray, CT head ABG ordered.  Primary attending updated.  Discussed with ICU NP.  She already communicated with intensivist.  1 unit packed RBC ordered due to concern for hypertension and ongoing bleeding.  Her hemoglobin this morning was 6.5 and she received 1 unit packed RBC.  Her potassium was 5.2.  ABG showed normal pH, pCO2 in the 30s and PaO2 around 300.      Cct 35 min    ,Khalif Mir [facility] Medicine

## 2024-07-31 NOTE — PLAN OF CARE
Problem: Discharge Planning  Goal: Discharge to home or other facility with appropriate resources  Outcome: Progressing  Flowsheets  Taken 7/30/2024 2022 by Vamsi Ramos RN  Discharge to home or other facility with appropriate resources: Identify barriers to discharge with patient and caregiver  Taken 7/30/2024 1100 by Ying Gaines, RN  Discharge to home or other facility with appropriate resources: Identify barriers to discharge with patient and caregiver     Problem: Safety - Adult  Goal: Free from fall injury  Outcome: Progressing  Flowsheets (Taken 7/30/2024 2216)  Free From Fall Injury: Instruct family/caregiver on patient safety     Problem: Pain  Goal: Verbalizes/displays adequate comfort level or baseline comfort level  Outcome: Progressing  Flowsheets (Taken 7/30/2024 2216)  Verbalizes/displays adequate comfort level or baseline comfort level:   Encourage patient to monitor pain and request assistance   Assess pain using appropriate pain scale   Implement non-pharmacological measures as appropriate and evaluate response   Consider cultural and social influences on pain and pain management   Notify Licensed Independent Practitioner if interventions unsuccessful or patient reports new pain   Administer analgesics based on type and severity of pain and evaluate response     Problem: Chronic Conditions and Co-morbidities  Goal: Patient's chronic conditions and co-morbidity symptoms are monitored and maintained or improved  Recent Flowsheet Documentation  Taken 7/30/2024 2022 by Vamsi Ramos RN  Care Plan - Patient's Chronic Conditions and Co-Morbidity Symptoms are Monitored and Maintained or Improved: Monitor and assess patient's chronic conditions and comorbid symptoms for stability, deterioration, or improvement  Taken 7/30/2024 1100 by Ying Gaines, RN  Care Plan - Patient's Chronic Conditions and Co-Morbidity Symptoms are Monitored and Maintained or Improved: Monitor and assess

## 2024-07-31 NOTE — CARE COORDINATION
Received call from Ninoska paris just getting an update to check on pt. I let them know she may go to SNF but still in ICU on IV Levo. They will call back in few days for update.

## 2024-07-31 NOTE — TELEPHONE ENCOUNTER
Called patient LMOVM to return my call in the office to reschedule an Appointment (8-4-24) Provider not Available.

## 2024-07-31 NOTE — PROGRESS NOTES
Patient on small dose of levophed, weaning down as pt. Tolerates, see MAR for titrations. Hgb this am 6.5, pt. Received 1 unit PRBC, Hgb came up to 7.5. This unit of blood also seemed to help with BP, as levo able to be weaned down more after blood infused. Pt. Tolerated blood infusion well, no s/s transfusion reaction. Pt. Is A+Ox4, denies pain, on room air, SR on monitor. Pt. Made npo this am for fistulagram this afternoon. Have been in contact with IR as well as dialysis RN to coordinate times for care.     Pt. Has left chest Kenyon (charted on LDA as Left IJ CVC). Line originally had gauze dressing/tegaderm due to bleeding yesterday. This dressing was changed to central line dressing (CHG). No bleeding from line with removal of gauze dressing, or cleaning/application of new dressing. Dressing remains clean, dry, intact. Pt. Was noted to be scratching at the dressing. Told pt. Not to scratch/touch dressing, and informed her of infection risk.     Pt. Has gauze wraps/foam dressings to BUE/hands to prevent from picking.     Approx. 1300 pt. Taken down to cath lab for fistulagram. After exiting elevator, pt.'s LUE around fistula started bleeding spontanesouly, large amount. Pressure immediately held to site. Pt. Transferred to cath lab bed. Stitch placed by IR doctor. Bleeding under control at this time. IR to continue with fistulagram.    About 1416pm, rapid reponse was called. This RN responded to rapid.   Pt. Was found to be unresponsive, hypotensive, and hypoxic. Levophed was increased. Pt. Was being bagged by RT. Unable to palpate pulse, PEA arrest. CPR was started. Pt. Received 1 round of CPR and 1 epi. ROSC obtained. Pt. Was intubated during code.   Unit of blood ordered.   Pt. Taken to CT scan and then returned to ICU room.  Hgb on ABG read 8.6, still give unit of blood per Milagros KAMARA.   Post transfusion H+H (2 units total today PRBCs), Hgb up to 8.9.     Lines placed by Milagros KAMARA.   Pt. Was rigid and  fighting vent. Sedation increased per verbal orders.   Pt. Not following commands. Pupils were nonreactive to light. Arctic sun started.     Pt. Heart rhythm on bedside monitor noted to be irregular. P wave visible at times. Pt. Also bradycardic into 40's, lowest observed was 45.   Called Dr. Marsh to inform him of irregular HR/bradycardic. Ordered to lower propofol to see if pt. Needs switched to versed. EKG also ordered.   HR improved after lowered propofol.   EKG done reading afib. Daughter at bedside states pt. Does not have history of afib.  Sent message to Dr. Ortiz to inform him of new onset afib.   Also called Dr. Marsh to inform him of improved HR but new onset afib.   Echo was also done at bedside this afternoon.     CRRT started at 1821.     1900-handoff report given at bedside to Casimiro RN/Taiwo RN.   Reviewed CRRT. Assessed pt.'s lines, no bleeding at this time.

## 2024-07-31 NOTE — PROGRESS NOTES
Hospitalist Progress Note      PCP: Adilene Terry MD    Date of Admission: 7/27/2024    Chief Complaint:  no acute events overnight, had bleeding from dialysis access site yesterday, dialysis was cancelled, Hb down to 6.5, 1 unit of PRBCs ordered, is afebrile, remains hypotensive requiring Norepinephrine support,     --- patient started bleeding again from the AV fistula site today while being transported to  for fistulogram requiring manual pressure and sutures, became unresponsive after BP and SPO2 dropped during the procedure, rapid response was called, lost her pulse, rhythm was PEA, CPR started, ROSC was obtained after 1 round of CPR, intubated, CT head was negative, returned to ICU        Medications:  Reviewed    Infusion Medications    sodium chloride      sodium chloride      sodium chloride      sodium chloride      norepinephrine 4 mcg/min (07/31/24 0850)    sodium chloride      dextrose       Scheduled Medications    sodium chloride flush  5-40 mL IntraVENous 2 times per day    insulin lispro  0-16 Units SubCUTAneous TID WC    insulin lispro  0-4 Units SubCUTAneous Nightly    sodium chloride flush  5-40 mL IntraVENous 2 times per day    amoxicillin  500 mg Oral 2 times per day    Virt-Caps  1 capsule Oral Daily    docusate sodium  100 mg Oral BID    pantoprazole  20 mg Oral Daily    Vitamin D  2,000 Units Oral Daily    ARIPiprazole  5 mg Oral Daily    fludrocortisone  0.1 mg Oral BID    sertraline  50 mg Oral Daily    miconazole   Topical BID    epoetin chadwick-epbx  8,000 Units SubCUTAneous Once per day on Mon Wed Fri    sodium chloride flush  5-40 mL IntraVENous 2 times per day    insulin glargine  35 Units SubCUTAneous Nightly    hydrocortisone  5 mg Oral Nightly    hydrocortisone  10 mg Oral Daily    midodrine  20 mg Oral TID WC    budesonide-formoterol  2 puff Inhalation BID RT    And    tiotropium  2 puff Inhalation Daily RT    pill splitter   Does not apply Once     PRN Meds: sodium chloride,  sodium chloride, naloxone 0.4 mg in 10 mL sodium chloride syringe, sodium chloride flush, sodium chloride, fentanNYL, HYDROmorphone, sodium chloride flush, sodium chloride, albuterol, melatonin, sodium chloride flush, sodium chloride, ondansetron **OR** ondansetron, polyethylene glycol, acetaminophen **OR** acetaminophen, glucose, dextrose bolus **OR** dextrose bolus, glucagon (rDNA), dextrose      Intake/Output Summary (Last 24 hours) at 7/31/2024 1015  Last data filed at 7/31/2024 0850  Gross per 24 hour   Intake 752.31 ml   Output --   Net 752.31 ml         Exam:    BP (!) 111/34   Pulse 83   Temp 97.6 °F (36.4 °C) (Oral)   Resp 20   Ht 1.727 m (5' 8\")   Wt 97 kg (213 lb 14.4 oz)   LMP  (LMP Unknown)   SpO2 98%   BMI 32.52 kg/m²     General appearance: intubated.  Respiratory:  clear to auscultation bilaterally  Cardiovascular: Regular rate and rhythm, S1/S2  Abdomen: Soft, active bowel sounds.  Musculoskeletal: edema bilaterally.       Labs:   Recent Labs     07/29/24  0525 07/30/24  0500 07/31/24  0600   WBC 12.2* 13.3* 11.0*   HGB 7.7* 7.3* 6.5*   HCT 23.8* 23.0* 20.0*    216 170       Recent Labs     07/29/24  0525 07/30/24  0500 07/31/24  0600   * 131* 130*   K 4.1 4.4 4.8   CL 95 91* 89*   CO2 26 23 19*   BUN 34* 40* 41*   CREATININE 5.25* 6.18* 6.55*   CALCIUM 9.9 10.0* 10.1*   PHOS 4.1 4.8 5.3*       No results for input(s): \"AST\", \"ALT\", \"BILIDIR\", \"BILITOT\", \"ALKPHOS\" in the last 72 hours.    No results for input(s): \"INR\" in the last 72 hours.    No results for input(s): \"CKTOTAL\", \"TROPONINI\" in the last 72 hours.    Urinalysis:      Lab Results   Component Value Date/Time    NITRU Negative 10/06/2022 11:15 PM    WBCUA >100 10/06/2022 11:15 PM    BACTERIA MANY 10/06/2022 11:15 PM    RBCUA 10-20 10/06/2022 11:15 PM    BLOODU SMALL 10/06/2022 11:15 PM    SPECGRAV 1.030 09/17/2021 12:49 PM    GLUCOSEU 250 10/06/2022 11:15 PM       Radiology:  XR CHEST (SINGLE VIEW FRONTAL)   Final  Result   1. Left neck central venous catheter, tip in the expected location of the   brachiocephalic/SVC junction.   2. Cardiomegaly with mild pulmonary venous congestion.   3. Possible small right pleural effusion.         FLUORO FOR SURGICAL PROCEDURES   Final Result   Intraprocedural fluoroscopic spot images as above.  See separate procedure   report for more information.         CT ABDOMEN PELVIS WO CONTRAST Additional Contrast? None   Final Result   1. Large volume ascites similar and unchanged when compared to the prior   examination.   2. Small bilateral pleural effusions.   3. Cardiomegaly with mild interstitial edema at the lung bases.   4. Cholelithiasis.   5. Anasarca seen throughout the soft tissues similar and unchanged when   compared to the prior examination.         IR DIALYSIS FISTULAGRAM EVAL    (Results Pending)           Assessment/Plan:    63 y/o female with history of CAD s/p CABG/PCI, s/p TV repair, PAF/AFL, CVA with left sided weakness, IDDM2, ESRD on HD, anemia, schizophrenia, COVID-19 pneumonia, obesity, T11 fracture, hypogammaglobulinemia, E.Faecalis BSI in 7/2022, chronic left knee PJI/OM on chronic suppressive therapy with Amoxicillin, recently managed at [facility] from 3/29-4/12 for cardiac arrest s/p ROSC, acute / chronic hypotension due to septic shock and adrenal insufficiency and 4/16-5/1 for acute blood loss anemia due GI bleed in the setting of severely elevated INR , shock,  abdominal distention requiring large volume paracentesis, who presented with :      Cardiac arrest s/p ROSC  - patient started bleeding again from the AV fistula site today while being transported to IR for fistulogram requiring manual pressure and sutures,   - became unresponsive after BP and SPO2 dropped during the procedure,   - lost her pulse, rhythm was PEA, CPR started,   - ROSC was obtained after 1 round of CPR,   - intubated,   - followed by critical care service    Acute blood loss anemia  - due

## 2024-07-31 NOTE — PROGRESS NOTES
Spiritual Care Services     Summary of Visit:   responded to a Cod Blue in the Cath Lab. No family present during the code.  provided a supportive presence during the code and until the patient was stabilized by medical staff and brought back to the ICU.  then called patient's daughter and updated her and arranged for medical staff to provide further information to the daughter.    Encounter Summary  Encounter Overview/Reason: Crisis  Service Provided For: Patient and family together  Referral/Consult From: Multi-disciplinary team  Support System: Children  Last Encounter : 07/29/24  Complexity of Encounter: High  Begin Time: 1420  End Time : 1510  Total Time Calculated: 50 min     Crisis  Type: Code Blue                         Spiritual Assessment/Intervention/Outcomes:    Assessment: Unable to assess    Intervention: Sustaining Presence/Ministry of presence    Outcome: Did not respond      Care Plan:    Plan and Referrals  Plan/Referrals: Continue to visit, (comment)    Provide continued pastoral support top patient and family as needed or requested      Spiritual Care Services   Electronically signed by Chaplain Pearl on 7/31/2024 at 3:12 PM.    To reach a  for emotional and spiritual support, place an EPIC consult request.   If a  is needed immediately, dial “0” and ask to page the on-call .

## 2024-07-31 NOTE — PROCEDURES
PROCEDURE NOTE  Date: 7/31/2024   Name: Michelle Le  YOB: 1960    CENTRAL LINE    Date/Time: 7/31/2024 3:57 PM    Performed by: Say Marsh MD  Authorized by: Say Marsh MD  Consent: Verbal consent not obtained.  Consent given by: daughter.  Procedure consent: procedure consent matches procedure scheduled  Relevant documents: relevant documents present and verified  Test results: test results available and properly labeled  Site marked: the operative site was marked  Imaging studies: imaging studies available  Required items: required blood products, implants, devices, and special equipment available  Patient identity confirmed: arm band  Time out: Immediately prior to procedure a \"time out\" was called to verify the correct patient, procedure, equipment, support staff and site/side marked as required.  Indications: vascular access (dialysis CRRT)  Anesthesia: local infiltration    Anesthesia:  Local Anesthetic: lidocaine 2% with epinephrine  Anesthetic total: 5 mL  Preparation: skin prepped with 2% chlorhexidine  Skin prep agent dried: skin prep agent completely dried prior to procedure  Sterile barriers: all five maximum sterile barriers used - cap, mask, sterile gown, sterile gloves, and large sterile sheet  Hand hygiene: hand hygiene performed prior to central venous catheter insertion  Location details: left femoral  Site selection rationale: Poor vascular access  Patient position: Flat on ventilator.  Catheter type: double lumen  Catheter size: 12 Fr  Pre-procedure: landmarks identified  Ultrasound guidance: yes  Sterile ultrasound techniques: sterile gel and sterile probe covers were used  Number of attempts: 1  Successful placement: yes  Post-procedure: line sutured (Guidewire removed and confirmed with nurse)  Patient tolerance: patient tolerated the procedure well with no immediate complications          PROCEDURE:   Radial artery line placement.

## 2024-07-31 NOTE — CODE DOCUMENTATION
Staff responding to Rapid Response:     Dr. Clarke Mckeon - ICU RN  Hannah Pennington - 1west  Storm Omer - Nursing supervisor   Ying Alexander - Respiratory   Milagros Billingsley - ICU   Spiritual Care   Cardiac Cath lab staff (multiple)

## 2024-07-31 NOTE — CONSULTS
Inpatient consult to GI  Consult performed by: Ronna Aquino, APRN - CNP  Consult ordered by: Werner Ortiz MD          Patient Name: Michelle Le  Admit Date: 2024  9:22 AM  MR #: [id number]  : 1960    Attending Physician: Werner Ortiz MD  Reason for consult: Recurrent large volume ascites    History of Presenting Illness:      Michelle Le is a 64 y.o. female on hospital day 4 with a history of end stage renal disease on hemodialysis, CHF, COPD, hypertension, hyperlipidemia, seizures, diabetes, CVA, MI, MRSA bacteremia, chronic left knee osteomyelitis.  Past surgical history of coronary angioplasty with stents, CABG, tricuspid valve repair complicated by tamponade and pericardial window, IR tunneled dialysis catheter, AV fistula, , total knee arthroplasty, toe amputation, and GI endoscopic procedures.  Family history is negative for GI malignancy.  Social history denies nicotine, EtOH or illicit drug use history Obtained From:  patient, electronic medical record    GI consult for recurrent large volume ascites-patient was admitted with acute blood loss anemia from AV fistula.  GI was asked to evaluate recurrent large volume ascites.  Patient carries a diagnosis of cirrhosis, she requires large-volume paracentesis as needed, she has a history of end-stage renal disease and is on hemodialysis 3 days a week.  Had recent paracentesis with ascitic fluid notable for SAAG 2.1 with total protein of 2.9, indicative of cardiac origin.  Has plan for fistulagram and has repeat echo planned for today.  Ascitic fluid is negative for SBP, patient has no history of SBP.  Noted patient has a significant cardiac history with most recent echocardiogram in April notable for severely elevated RVSP, severe pulmonary hypertension and estimated RVSP of 76 mmHg.     History:      Past Medical History:   Diagnosis Date    Angina at rest (HCC) 2020    Anxiety     CAD S/P percutaneous coronary  obvious obstruction or obstructing lesion. Pelvis: The bladder is incompletely distended.  The uterus has been surgically removed. Peritoneum/Retroperitoneum: No abdominal retroperitoneal lymphadenopathy. Vascular calcifications seen within the abdominal aorta and iliac vessels. Atherosclerotic disease seen within the mesenteric vessels.  Large volume ascites.  Similar and unchanged when compared to the prior study.  No extraluminal air. Bones/Soft Tissues: Bony structures reveal multilevel degenerative changes seen within the spine.  There is anasarca seen throughout the soft tissues similar and unchanged when compared to the prior examination.     1. Large volume ascites similar and unchanged when compared to the prior examination. 2. Small bilateral pleural effusions. 3. Cardiomegaly with mild interstitial edema at the lung bases. 4. Cholelithiasis. 5. Anasarca seen throughout the soft tissues similar and unchanged when compared to the prior examination.     IR US GUIDED PARACENTESIS    1.  Status post technically successful ultrasound-guided paracentesis.  CLINICAL HISTORY:VELASQUEZ GARBER is a Female of 64 years age, referred for Ultrasound Guided Paracentesis.  PROCEDURE: Survey of the abdomen showed large amount of ascites fluid. After obtaining informed consent, the patient was positioned supine on the sonography table. Using ultrasound, the skin over the left hemiabdomen was locally anesthetized with 1% lidocaine.  Following that, a Yueh needle was advanced into the fluid pocket using ultrasound visualization. 7600 cc, of clear yellow fluid were aspirated and sent for cytology, and pathology.  The needle was removed, and hemostasis was obtained with pressure.  A Band-Aid was placed. Post procedure images did not demonstrate hemorrhage at the target site. The patient tolerated the procedure well. The patient left the department in good condition. A radiology nurse was in presence monitoring vital signs,  hepatopathy is treatment of the underlying heart disease, prognosis in patients with congestive hepatopathy is predicted mostly by the severity of the underlying heart disease. Liver disease rarely contributes significantly to morbidity or mortality.  2 - Grade 3 Ascites:   SAAG 2.1, TP 2.9 indicative of cardiac origin  History of end-stage renal disease on hemodialysis 3 times a week  Ascitic fluid is negative for SBP, No Hx of SBP noted  Not candidate for elective TIPS at this time  -cardiac optimization  -therapeutic LVP as needed  3- EGD for Variceal surveillance:    Had EGD 4/24 no endoscopic evidence of varices, bleeding, ulceration, mass or erythema or inflammatory changes suggestive of gastritis in the entire examined stomach.  Hgb 7.5 no overt GIB  4 - HCC screening:   Recent imaging Negative for liver mass    Comments:     Thank you for allowing us to participate in the care of this patient.     Will continue to follow.    Please call if questions or concerns arise.    Electronically signed by LORETO Mcfadden CNP on 7/31/2024 at 1:02 PM    Please note this report has been partially produced using speech recognition software and may cause contain errors related to that system including grammar, punctuation and spelling as well as words and phrases that may seem inappropriate. If there are questions or concerns please feel free to contact me to clarify.

## 2024-07-31 NOTE — CONSULTS
Vascular Medicine and Interventional Radiology    Date of Consultation:2024    Michelle Le  : 1960  MR #: [id number]    Consultant:LORETO Anglin - ETHAN  Consulting Physician: Dr. Eze White, Vascular Medicine and Interventional Radiology     Consult Ordered By: Dr. Larios            PCP:  Adilene Terry MD     Attending Physician: Werner Ortiz MD     Date of Admission: 2024  9:22 AM     Chief Complaint:   Chief Complaint   Patient presents with    Hypotension     Picked at HD site and started bleeding, hypotensive when EMS arrived      Reason for Consultation: Bleeding from fistula    History of Present Illness: 64-year-old female with history of ESRD on HD (T-Th-Sat) admitted 3 days ago with bleeding from left upper extremity brachiobasilic AV fistula.  Patient reports she was at home with her daughter, her daughter took her bandage off after her last dialysis session and it began bleeding profusely so she was brought to the ER, found to be hypotensive in the context of acute blood loss anemia and she was given blood transfusion, transferred to ICU. Patient denies scratching or picking at her fistula. Per nursing and patient, yesterday an attempt at resuming dialysis was made, however when the bandage was taken off, the fistula began bleeding again so pressure was held, Celox applied and hemostasis achieved. Eliquis on home medication list, although has not received any per MAR since admit. IR consulted for fistulagram. She is known to IR services from prior paracentesis and fistulagram of right upper extremity in the past.  Did have bedside paracentesis in ICU 2024 with 4.5 L removed.  Today, Hgb down to 6.5 this morning, currently receiving a unit of PRBCs. She remains on Levophed via left upper chest gomez catheter placed by surgery 24.  She denies shortness of breath, chest pain, abdominal pain.    Past Medical History:   has a past medical history of Angina  Comments: Left Kenyon dressing clean/dry/intact. Left upper extremity AV fistula dressings clean/dry/intact with Celox noted underneath the dressing. No active bleeding.   Pulmonary:      Effort: Pulmonary effort is normal. No respiratory distress.      Breath sounds: Normal breath sounds.   Abdominal:      General: Bowel sounds are normal.      Palpations: Abdomen is soft.   Musculoskeletal:         General: Normal range of motion.      Cervical back: Normal range of motion and neck supple.   Lymphadenopathy:      Cervical: No cervical adenopathy.   Skin:     General: Skin is warm and dry.      Coloration: Skin is not jaundiced.   Neurological:      General: No focal deficit present.      Mental Status: She is alert and oriented to person, place, and time. Mental status is at baseline.   Psychiatric:         Mood and Affect: Mood normal.         Behavior: Behavior normal.         Thought Content: Thought content normal.         Judgment: Judgment normal.       Lab Results   Component Value Date    WBC 11.0 (H) 07/31/2024    HGB 7.5 (L) 07/31/2024    HCT 22.1 (L) 07/31/2024    MCV 91.3 07/31/2024     07/31/2024     Lab Results   Component Value Date     (L) 07/31/2024    K 4.8 07/31/2024    CL 89 (L) 07/31/2024    CO2 19 (L) 07/31/2024    BUN 41 (H) 07/31/2024    CREATININE 6.55 (HH) 07/31/2024    GLUCOSE 192 (H) 07/31/2024    CALCIUM 10.1 (H) 07/31/2024    BILITOT 1.4 (H) 07/31/2024    ALKPHOS 72 07/31/2024    AST 14 07/31/2024    ALT 7 07/31/2024    LABGLOM 6.5 (L) 07/31/2024    GFRAA 17.5 (L) 10/06/2022    GLOB 2.5 07/27/2024       Lab Results   Component Value Date    INR 1.7 07/27/2024    INR 1.3 07/01/2024    INR 1.2 04/30/2024    PROTIME 19.8 (H) 07/27/2024    PROTIME 16.7 (H) 07/01/2024    PROTIME 15.5 (H) 04/30/2024     Xray Result (most recent):  XR CHEST (SINGLE VIEW FRONTAL) 07/30/2024    Narrative  EXAMINATION:  ONE XRAY VIEW OF THE CHEST    7/30/2024 9:11 am    COMPARISON:  April

## 2024-07-31 NOTE — CARE COORDINATION
Team ICU rounds done this am at bedside and pt to have Fistulagram and new HD access done today. She also is on IV Levo and getting blood today. I met with her after rounds to discuss her dc plan. She would like to go to [facility] before going home with dtr. I spoke to dtr yest and she had said whatever her mom wanted she was good with. I will send Qiana referral for [facility].

## 2024-08-01 LAB
ANION GAP SERPL CALCULATED.3IONS-SCNC: 14 MEQ/L (ref 9–15)
ANION GAP SERPL CALCULATED.3IONS-SCNC: 15 MEQ/L (ref 9–15)
ANION GAP SERPL CALCULATED.3IONS-SCNC: 15 MEQ/L (ref 9–15)
ANION GAP SERPL CALCULATED.3IONS-SCNC: 16 MEQ/L (ref 9–15)
BASE EXCESS ARTERIAL: -1 (ref -3–3)
BASE EXCESS ARTERIAL: -2 (ref -3–3)
BASE EXCESS ARTERIAL: -2 (ref -3–3)
BASE EXCESS ARTERIAL: -3 (ref -3–3)
BASOPHILS # BLD: 0.1 K/UL (ref 0–0.2)
BASOPHILS NFR BLD: 0.5 %
BLOOD BANK DISPENSE STATUS: NORMAL
BLOOD BANK PRODUCT CODE: NORMAL
BPU ID: NORMAL
BUN SERPL-MCNC: 25 MG/DL (ref 8–23)
BUN SERPL-MCNC: 26 MG/DL (ref 8–23)
BUN SERPL-MCNC: 28 MG/DL (ref 8–23)
BUN SERPL-MCNC: 32 MG/DL (ref 8–23)
CALCIUM IONIZED: 1.2 MMOL/L (ref 1.12–1.32)
CALCIUM IONIZED: 1.21 MMOL/L (ref 1.12–1.32)
CALCIUM IONIZED: 1.23 MMOL/L (ref 1.12–1.32)
CALCIUM IONIZED: 1.24 MMOL/L (ref 1.12–1.32)
CALCIUM IONIZED: 1.24 MMOL/L (ref 1.12–1.32)
CALCIUM IONIZED: 1.25 MMOL/L (ref 1.12–1.32)
CALCIUM SERPL-MCNC: 9.3 MG/DL (ref 8.5–9.9)
CALCIUM SERPL-MCNC: 9.5 MG/DL (ref 8.5–9.9)
CALCIUM SERPL-MCNC: 9.6 MG/DL (ref 8.5–9.9)
CALCIUM SERPL-MCNC: 9.6 MG/DL (ref 8.5–9.9)
CHLORIDE SERPL-SCNC: 92 MEQ/L (ref 95–107)
CHLORIDE SERPL-SCNC: 95 MEQ/L (ref 95–107)
CO2 SERPL-SCNC: 20 MEQ/L (ref 20–31)
CO2 SERPL-SCNC: 21 MEQ/L (ref 20–31)
CREAT SERPL-MCNC: 4.03 MG/DL (ref 0.5–0.9)
CREAT SERPL-MCNC: 4.17 MG/DL (ref 0.5–0.9)
CREAT SERPL-MCNC: 4.29 MG/DL (ref 0.5–0.9)
CREAT SERPL-MCNC: 4.69 MG/DL (ref 0.5–0.9)
DESCRIPTION BLOOD BANK: NORMAL
ECHO AO ROOT DIAM: 3.2 CM
ECHO AO ROOT INDEX: 1.52 CM/M2
ECHO AV AREA PEAK VELOCITY: 3.1 CM2
ECHO AV AREA/BSA PEAK VELOCITY: 1.5 CM2/M2
ECHO AV CUSP MM: 1.5 CM
ECHO AV PEAK GRADIENT: 3 MMHG
ECHO AV PEAK VELOCITY: 0.9 M/S
ECHO AV VELOCITY RATIO: 1.11
ECHO BSA: 2.15 M2
ECHO EST RA PRESSURE: 3 MMHG
ECHO LA DIAMETER INDEX: 1.86 CM/M2
ECHO LA DIAMETER: 3.9 CM
ECHO LA TO AORTIC ROOT RATIO: 1.22
ECHO LA VOL A-L A2C: 61 ML (ref 22–52)
ECHO LA VOL A-L A4C: 92 ML (ref 22–52)
ECHO LA VOL MOD A2C: 59 ML (ref 22–52)
ECHO LA VOL MOD A4C: 88 ML (ref 22–52)
ECHO LA VOLUME AREA LENGTH: 76 ML
ECHO LA VOLUME INDEX A-L A2C: 29 ML/M2 (ref 16–34)
ECHO LA VOLUME INDEX A-L A4C: 44 ML/M2 (ref 16–34)
ECHO LA VOLUME INDEX AREA LENGTH: 36 ML/M2 (ref 16–34)
ECHO LA VOLUME INDEX MOD A2C: 28 ML/M2 (ref 16–34)
ECHO LA VOLUME INDEX MOD A4C: 42 ML/M2 (ref 16–34)
ECHO LV EDV A2C: 70 ML
ECHO LV EDV A4C: 70 ML
ECHO LV EDV BP: 70 ML (ref 56–104)
ECHO LV EDV INDEX A4C: 33 ML/M2
ECHO LV EDV INDEX BP: 33 ML/M2
ECHO LV EDV NDEX A2C: 33 ML/M2
ECHO LV EJECTION FRACTION A2C: 71 %
ECHO LV EJECTION FRACTION A4C: 61 %
ECHO LV EJECTION FRACTION BIPLANE: 65 % (ref 55–100)
ECHO LV ESV A2C: 20 ML
ECHO LV ESV A4C: 27 ML
ECHO LV ESV BP: 24 ML (ref 19–49)
ECHO LV ESV INDEX A2C: 10 ML/M2
ECHO LV ESV INDEX A4C: 13 ML/M2
ECHO LV ESV INDEX BP: 11 ML/M2
ECHO LV FRACTIONAL SHORTENING: 26 % (ref 28–44)
ECHO LV INTERNAL DIMENSION DIASTOLE INDEX: 1.48 CM/M2
ECHO LV INTERNAL DIMENSION DIASTOLIC: 3.1 CM (ref 3.9–5.3)
ECHO LV INTERNAL DIMENSION SYSTOLIC INDEX: 1.1 CM/M2
ECHO LV INTERNAL DIMENSION SYSTOLIC: 2.3 CM
ECHO LV IVSD: 1.6 CM (ref 0.6–0.9)
ECHO LV IVSS: 1.7 CM
ECHO LV MASS 2D: 237.4 G (ref 67–162)
ECHO LV MASS INDEX 2D: 113.1 G/M2 (ref 43–95)
ECHO LV POSTERIOR WALL DIASTOLIC: 2.1 CM (ref 0.6–0.9)
ECHO LV POSTERIOR WALL SYSTOLIC: 1.7 CM
ECHO LV RELATIVE WALL THICKNESS RATIO: 1.35
ECHO LVOT AREA: 2.5 CM2
ECHO LVOT DIAM: 1.8 CM
ECHO LVOT PEAK GRADIENT: 4 MMHG
ECHO LVOT PEAK VELOCITY: 1 M/S
ECHO MV A VELOCITY: 0.04 M/S
ECHO MV AREA PHT: 2.8 CM2
ECHO MV E DECELERATION TIME (DT): 277.4 MS
ECHO MV E VELOCITY: 1.45 M/S
ECHO MV E/A RATIO: 36.25
ECHO MV MAX VELOCITY: 1.6 M/S
ECHO MV MEAN GRADIENT: 5 MMHG
ECHO MV MEAN VELOCITY: 1 M/S
ECHO MV PEAK GRADIENT: 10 MMHG
ECHO MV PRESSURE HALF TIME (PHT): 79.7 MS
ECHO MV VTI: 43.6 CM
ECHO PULMONARY ARTERY END DIASTOLIC PRESSURE: 4 MMHG
ECHO PV MAX VELOCITY: 0.9 M/S
ECHO PV PEAK GRADIENT: 3 MMHG
ECHO PV REGURGITANT MAX VELOCITY: 1 M/S
ECHO RIGHT VENTRICULAR SYSTOLIC PRESSURE (RVSP): 42 MMHG
ECHO RV INTERNAL DIMENSION: 3.1 CM
ECHO RV TAPSE: 0.7 CM (ref 1.7–?)
ECHO RVOT PEAK GRADIENT: 2 MMHG
ECHO RVOT PEAK VELOCITY: 0.8 M/S
ECHO TV REGURGITANT MAX VELOCITY: 3.13 M/S
ECHO TV REGURGITANT PEAK GRADIENT: 39 MMHG
EKG ATRIAL RATE: 96 BPM
EKG Q-T INTERVAL: 498 MS
EKG QRS DURATION: 140 MS
EKG QTC CALCULATION (BAZETT): 488 MS
EKG R AXIS: -56 DEGREES
EKG T AXIS: 144 DEGREES
EKG VENTRICULAR RATE: 58 BPM
EOSINOPHIL # BLD: 0.6 K/UL (ref 0–0.7)
EOSINOPHIL NFR BLD: 4.8 %
ERYTHROCYTE [DISTWIDTH] IN BLOOD BY AUTOMATED COUNT: 18.8 % (ref 11.5–14.5)
GLUCOSE BLD-MCNC: 107 MG/DL (ref 70–99)
GLUCOSE BLD-MCNC: 109 MG/DL (ref 70–99)
GLUCOSE BLD-MCNC: 146 MG/DL (ref 70–99)
GLUCOSE BLD-MCNC: 84 MG/DL (ref 70–99)
GLUCOSE BLD-MCNC: 87 MG/DL (ref 70–99)
GLUCOSE BLD-MCNC: 87 MG/DL (ref 70–99)
GLUCOSE BLD-MCNC: 88 MG/DL (ref 70–99)
GLUCOSE BLD-MCNC: 89 MG/DL (ref 70–99)
GLUCOSE BLD-MCNC: 92 MG/DL (ref 70–99)
GLUCOSE BLD-MCNC: 93 MG/DL (ref 70–99)
GLUCOSE SERPL-MCNC: 107 MG/DL (ref 70–99)
GLUCOSE SERPL-MCNC: 90 MG/DL (ref 70–99)
GLUCOSE SERPL-MCNC: 92 MG/DL (ref 70–99)
GLUCOSE SERPL-MCNC: 92 MG/DL (ref 70–99)
HCO3 ARTERIAL: 20.3 MMOL/L (ref 21–29)
HCO3 ARTERIAL: 21 MMOL/L (ref 21–29)
HCO3 ARTERIAL: 21.4 MMOL/L (ref 21–29)
HCO3 ARTERIAL: 21.7 MMOL/L (ref 21–29)
HCO3 ARTERIAL: 21.8 MMOL/L (ref 21–29)
HCO3 ARTERIAL: 22.5 MMOL/L (ref 21–29)
HCT VFR BLD AUTO: 24 % (ref 36–48)
HCT VFR BLD AUTO: 25 % (ref 36–48)
HCT VFR BLD AUTO: 25.1 % (ref 37–47)
HCT VFR BLD AUTO: 26 % (ref 36–48)
HCT VFR BLD AUTO: 27 % (ref 36–48)
HCT VFR BLD AUTO: 27.8 % (ref 37–47)
HGB BLD CALC-MCNC: 8.2 GM/DL (ref 12–16)
HGB BLD CALC-MCNC: 8.5 GM/DL (ref 12–16)
HGB BLD CALC-MCNC: 8.8 GM/DL (ref 12–16)
HGB BLD CALC-MCNC: 8.8 GM/DL (ref 12–16)
HGB BLD CALC-MCNC: 9 GM/DL (ref 12–16)
HGB BLD CALC-MCNC: 9.2 GM/DL (ref 12–16)
HGB BLD-MCNC: 9 G/DL (ref 12–16)
HGB BLD-MCNC: 9.5 G/DL (ref 12–16)
LACTATE BLDV-SCNC: 1.2 MMOL/L (ref 0.5–2.2)
LACTATE BLDV-SCNC: 1.3 MMOL/L (ref 0.5–2.2)
LACTATE BLDV-SCNC: 1.3 MMOL/L (ref 0.5–2.2)
LACTATE: 1.05 MMOL/L (ref 0.4–2)
LACTATE: 1.09 MMOL/L (ref 0.4–2)
LACTATE: 1.09 MMOL/L (ref 0.4–2)
LACTATE: 1.21 MMOL/L (ref 0.4–2)
LACTATE: 1.21 MMOL/L (ref 0.4–2)
LACTATE: 1.26 MMOL/L (ref 0.4–2)
LYMPHOCYTES # BLD: 1.3 K/UL (ref 1–4.8)
LYMPHOCYTES NFR BLD: 9.5 %
MAGNESIUM SERPL-MCNC: 1.9 MG/DL (ref 1.7–2.4)
MCH RBC QN AUTO: 30.2 PG (ref 27–31.3)
MCHC RBC AUTO-ENTMCNC: 35.9 % (ref 33–37)
MCV RBC AUTO: 84.2 FL (ref 79.4–94.8)
MONOCYTES # BLD: 0.6 K/UL (ref 0.2–0.8)
MONOCYTES NFR BLD: 4.4 %
NEUTROPHILS # BLD: 10.5 K/UL (ref 1.4–6.5)
NEUTS SEG NFR BLD: 80.1 %
O2 SAT, ARTERIAL: 96 % (ref 93–100)
O2 SAT, ARTERIAL: 98 % (ref 93–100)
PCO2 ARTERIAL: 25 MM HG (ref 35–45)
PCO2 ARTERIAL: 25 MM HG (ref 35–45)
PCO2 ARTERIAL: 26 MM HG (ref 35–45)
PCO2 ARTERIAL: 28 MM HG (ref 35–45)
PERFORMED ON: ABNORMAL
PERFORMED ON: NORMAL
PH ARTERIAL: 7.49 (ref 7.35–7.45)
PH ARTERIAL: 7.5 (ref 7.35–7.45)
PH ARTERIAL: 7.51 (ref 7.35–7.45)
PH ARTERIAL: 7.51 (ref 7.35–7.45)
PH ARTERIAL: 7.54 (ref 7.35–7.45)
PH ARTERIAL: 7.54 (ref 7.35–7.45)
PHOSPHATE SERPL-MCNC: 2.6 MG/DL (ref 2.3–4.8)
PHOSPHATE SERPL-MCNC: 2.6 MG/DL (ref 2.3–4.8)
PHOSPHATE SERPL-MCNC: 2.8 MG/DL (ref 2.3–4.8)
PHOSPHATE SERPL-MCNC: 3.2 MG/DL (ref 2.3–4.8)
PLATELET # BLD AUTO: 144 K/UL (ref 130–400)
PO2 ARTERIAL: 70 MM HG (ref 75–108)
PO2 ARTERIAL: 92 MM HG (ref 75–108)
PO2 ARTERIAL: 93 MM HG (ref 75–108)
PO2 ARTERIAL: 94 MM HG (ref 75–108)
PO2 ARTERIAL: 95 MM HG (ref 75–108)
PO2 ARTERIAL: 98 MM HG (ref 75–108)
POC CHLORIDE: 97 MEQ/L (ref 99–110)
POC CHLORIDE: 98 MEQ/L (ref 99–110)
POC CHLORIDE: 99 MEQ/L (ref 99–110)
POC CHLORIDE: 99 MEQ/L (ref 99–110)
POC CREATININE: 3.7 MG/DL (ref 0.6–1.2)
POC CREATININE: 4.1 MG/DL (ref 0.6–1.2)
POC CREATININE: 4.1 MG/DL (ref 0.6–1.2)
POC CREATININE: 4.2 MG/DL (ref 0.6–1.2)
POC CREATININE: 4.3 MG/DL (ref 0.6–1.2)
POC CREATININE: 4.6 MG/DL (ref 0.6–1.2)
POC FIO2: 30
POC SAMPLE TYPE: ABNORMAL
POTASSIUM SERPL-SCNC: 3.5 MEQ/L (ref 3.5–5.1)
POTASSIUM SERPL-SCNC: 3.5 MEQ/L (ref 3.5–5.1)
POTASSIUM SERPL-SCNC: 3.6 MEQ/L (ref 3.4–4.9)
POTASSIUM SERPL-SCNC: 3.6 MEQ/L (ref 3.5–5.1)
POTASSIUM SERPL-SCNC: 3.6 MEQ/L (ref 3.5–5.1)
POTASSIUM SERPL-SCNC: 3.7 MEQ/L (ref 3.4–4.9)
POTASSIUM SERPL-SCNC: 3.7 MEQ/L (ref 3.5–5.1)
POTASSIUM SERPL-SCNC: 3.8 MEQ/L (ref 3.4–4.9)
POTASSIUM SERPL-SCNC: 3.8 MEQ/L (ref 3.5–5.1)
POTASSIUM SERPL-SCNC: 3.9 MEQ/L (ref 3.4–4.9)
RBC # BLD AUTO: 2.98 M/UL (ref 4.2–5.4)
SODIUM BLD-SCNC: 129 MEQ/L (ref 136–145)
SODIUM BLD-SCNC: 131 MEQ/L (ref 136–145)
SODIUM SERPL-SCNC: 129 MEQ/L (ref 135–144)
SODIUM SERPL-SCNC: 130 MEQ/L (ref 135–144)
SODIUM SERPL-SCNC: 130 MEQ/L (ref 135–144)
SODIUM SERPL-SCNC: 131 MEQ/L (ref 135–144)
TCO2 ARTERIAL: 21 MMOL/L (ref 21–32)
TCO2 ARTERIAL: 22 MMOL/L (ref 21–32)
TCO2 ARTERIAL: 23 MMOL/L (ref 21–32)
TCO2 ARTERIAL: 23 MMOL/L (ref 21–32)
WBC # BLD AUTO: 13.1 K/UL (ref 4.8–10.8)

## 2024-08-01 PROCEDURE — 2700000000 HC OXYGEN THERAPY PER DAY

## 2024-08-01 PROCEDURE — 90945 DIALYSIS ONE EVALUATION: CPT

## 2024-08-01 PROCEDURE — 82565 ASSAY OF CREATININE: CPT

## 2024-08-01 PROCEDURE — 99291 CRITICAL CARE FIRST HOUR: CPT | Performed by: INTERNAL MEDICINE

## 2024-08-01 PROCEDURE — 82435 ASSAY OF BLOOD CHLORIDE: CPT

## 2024-08-01 PROCEDURE — 94003 VENT MGMT INPAT SUBQ DAY: CPT

## 2024-08-01 PROCEDURE — 84132 ASSAY OF SERUM POTASSIUM: CPT

## 2024-08-01 PROCEDURE — 6370000000 HC RX 637 (ALT 250 FOR IP): Performed by: NURSE PRACTITIONER

## 2024-08-01 PROCEDURE — 93010 ELECTROCARDIOGRAM REPORT: CPT | Performed by: INTERNAL MEDICINE

## 2024-08-01 PROCEDURE — 2500000003 HC RX 250 WO HCPCS: Performed by: INTERNAL MEDICINE

## 2024-08-01 PROCEDURE — 37799 UNLISTED PX VASCULAR SURGERY: CPT

## 2024-08-01 PROCEDURE — 5A12012 PERFORMANCE OF CARDIAC OUTPUT, SINGLE, MANUAL: ICD-10-PCS

## 2024-08-01 PROCEDURE — 99233 SBSQ HOSP IP/OBS HIGH 50: CPT | Performed by: NURSE PRACTITIONER

## 2024-08-01 PROCEDURE — 85025 COMPLETE CBC W/AUTO DIFF WBC: CPT

## 2024-08-01 PROCEDURE — 93306 TTE W/DOPPLER COMPLETE: CPT | Performed by: INTERNAL MEDICINE

## 2024-08-01 PROCEDURE — 2580000003 HC RX 258: Performed by: INTERNAL MEDICINE

## 2024-08-01 PROCEDURE — 6360000002 HC RX W HCPCS: Performed by: STUDENT IN AN ORGANIZED HEALTH CARE EDUCATION/TRAINING PROGRAM

## 2024-08-01 PROCEDURE — 99223 1ST HOSP IP/OBS HIGH 75: CPT | Performed by: INTERNAL MEDICINE

## 2024-08-01 PROCEDURE — 2500000003 HC RX 250 WO HCPCS: Performed by: COLON & RECTAL SURGERY

## 2024-08-01 PROCEDURE — 6360000002 HC RX W HCPCS: Performed by: INTERNAL MEDICINE

## 2024-08-01 PROCEDURE — 2580000003 HC RX 258: Performed by: COLON & RECTAL SURGERY

## 2024-08-01 PROCEDURE — 2580000003 HC RX 258: Performed by: ANESTHESIOLOGY

## 2024-08-01 PROCEDURE — 6370000000 HC RX 637 (ALT 250 FOR IP): Performed by: COLON & RECTAL SURGERY

## 2024-08-01 PROCEDURE — 2500000003 HC RX 250 WO HCPCS: Performed by: NURSE PRACTITIONER

## 2024-08-01 PROCEDURE — 85018 HEMOGLOBIN: CPT

## 2024-08-01 PROCEDURE — 83605 ASSAY OF LACTIC ACID: CPT

## 2024-08-01 PROCEDURE — 84100 ASSAY OF PHOSPHORUS: CPT

## 2024-08-01 PROCEDURE — 80048 BASIC METABOLIC PNL TOTAL CA: CPT

## 2024-08-01 PROCEDURE — 2000000000 HC ICU R&B

## 2024-08-01 PROCEDURE — 82803 BLOOD GASES ANY COMBINATION: CPT

## 2024-08-01 PROCEDURE — 2580000003 HC RX 258: Performed by: NURSE PRACTITIONER

## 2024-08-01 PROCEDURE — 85014 HEMATOCRIT: CPT

## 2024-08-01 PROCEDURE — 6360000002 HC RX W HCPCS: Performed by: NURSE PRACTITIONER

## 2024-08-01 PROCEDURE — 5A12012 PERFORMANCE OF CARDIAC OUTPUT, SINGLE, MANUAL: ICD-10-PCS | Performed by: STUDENT IN AN ORGANIZED HEALTH CARE EDUCATION/TRAINING PROGRAM

## 2024-08-01 PROCEDURE — 82948 REAGENT STRIP/BLOOD GLUCOSE: CPT

## 2024-08-01 PROCEDURE — 6370000000 HC RX 637 (ALT 250 FOR IP): Performed by: INTERNAL MEDICINE

## 2024-08-01 PROCEDURE — 82330 ASSAY OF CALCIUM: CPT

## 2024-08-01 PROCEDURE — 2580000003 HC RX 258: Performed by: STUDENT IN AN ORGANIZED HEALTH CARE EDUCATION/TRAINING PROGRAM

## 2024-08-01 PROCEDURE — 84295 ASSAY OF SERUM SODIUM: CPT

## 2024-08-01 PROCEDURE — 83735 ASSAY OF MAGNESIUM: CPT

## 2024-08-01 RX ORDER — HEPARIN SODIUM 1000 [USP'U]/ML
INJECTION, SOLUTION INTRAVENOUS; SUBCUTANEOUS PRN
Status: DISCONTINUED | OUTPATIENT
Start: 2024-08-01 | End: 2024-08-04

## 2024-08-01 RX ORDER — MICONAZOLE NITRATE 20 MG/G
CREAM TOPICAL 2 TIMES DAILY
Status: DISCONTINUED | OUTPATIENT
Start: 2024-08-01 | End: 2024-08-08 | Stop reason: HOSPADM

## 2024-08-01 RX ADMIN — CHLORHEXIDINE GLUCONATE 15 ML: 1.2 RINSE ORAL at 21:30

## 2024-08-01 RX ADMIN — Medication: at 11:55

## 2024-08-01 RX ADMIN — Medication 10 ML: at 21:31

## 2024-08-01 RX ADMIN — PROPOFOL 50 MCG/KG/MIN: 10 INJECTION, EMULSION INTRAVENOUS at 20:17

## 2024-08-01 RX ADMIN — Medication 1000 ML/HR: at 23:12

## 2024-08-01 RX ADMIN — HEPARIN SODIUM 1300 UNITS: 1000 INJECTION INTRAVENOUS; SUBCUTANEOUS at 04:49

## 2024-08-01 RX ADMIN — Medication 10 ML: at 07:49

## 2024-08-01 RX ADMIN — MIDODRINE HYDROCHLORIDE 20 MG: 10 TABLET ORAL at 16:21

## 2024-08-01 RX ADMIN — Medication 275 MCG/HR: at 09:05

## 2024-08-01 RX ADMIN — Medication 275 MCG/HR: at 05:05

## 2024-08-01 RX ADMIN — PROPOFOL 50 MCG/KG/MIN: 10 INJECTION, EMULSION INTRAVENOUS at 04:22

## 2024-08-01 RX ADMIN — CHLORHEXIDINE GLUCONATE 15 ML: 1.2 RINSE ORAL at 07:48

## 2024-08-01 RX ADMIN — HYDROCORTISONE 10 MG: 10 TABLET ORAL at 07:48

## 2024-08-01 RX ADMIN — SERTRALINE HYDROCHLORIDE 50 MG: 50 TABLET ORAL at 07:48

## 2024-08-01 RX ADMIN — MICONAZOLE NITRATE: 2 POWDER TOPICAL at 07:50

## 2024-08-01 RX ADMIN — Medication 300 MCG/HR: at 15:41

## 2024-08-01 RX ADMIN — FLUDROCORTISONE ACETATE 0.1 MG: 0.1 TABLET ORAL at 21:30

## 2024-08-01 RX ADMIN — PROPOFOL 50 MCG/KG/MIN: 10 INJECTION, EMULSION INTRAVENOUS at 08:36

## 2024-08-01 RX ADMIN — Medication 2000 UNITS: at 07:48

## 2024-08-01 RX ADMIN — Medication 175 MCG/HR: at 00:26

## 2024-08-01 RX ADMIN — HYDROCORTISONE 5 MG: 10 TABLET ORAL at 21:30

## 2024-08-01 RX ADMIN — PROPOFOL 50 MCG/KG/MIN: 10 INJECTION, EMULSION INTRAVENOUS at 10:18

## 2024-08-01 RX ADMIN — Medication 300 MCG/HR: at 22:23

## 2024-08-01 RX ADMIN — PROPOFOL 50 MCG/KG/MIN: 10 INJECTION, EMULSION INTRAVENOUS at 13:49

## 2024-08-01 RX ADMIN — FLUDROCORTISONE ACETATE 0.1 MG: 0.1 TABLET ORAL at 07:48

## 2024-08-01 RX ADMIN — MIDODRINE HYDROCHLORIDE 20 MG: 10 TABLET ORAL at 11:23

## 2024-08-01 RX ADMIN — Medication: at 11:58

## 2024-08-01 RX ADMIN — AMOXICILLIN 500 MG: 500 CAPSULE ORAL at 07:55

## 2024-08-01 RX ADMIN — MICONAZOLE NITRATE: 20 CREAM TOPICAL at 11:16

## 2024-08-01 RX ADMIN — MICONAZOLE NITRATE: 20 CREAM TOPICAL at 21:43

## 2024-08-01 RX ADMIN — HEPARIN SODIUM 1300 UNITS: 1000 INJECTION INTRAVENOUS; SUBCUTANEOUS at 14:22

## 2024-08-01 RX ADMIN — SODIUM CHLORIDE, PRESERVATIVE FREE 40 MG: 5 INJECTION INTRAVENOUS at 07:48

## 2024-08-01 RX ADMIN — Medication 300 MCG/HR: at 19:09

## 2024-08-01 RX ADMIN — MIDODRINE HYDROCHLORIDE 20 MG: 10 TABLET ORAL at 07:48

## 2024-08-01 RX ADMIN — Medication 10 ML: at 07:50

## 2024-08-01 RX ADMIN — Medication 200 ML/HR: at 23:12

## 2024-08-01 RX ADMIN — Medication 300 MCG/HR: at 11:57

## 2024-08-01 RX ADMIN — MIDAZOLAM HYDROCHLORIDE 2 MG/HR: 5 INJECTION, SOLUTION INTRAMUSCULAR; INTRAVENOUS at 18:45

## 2024-08-01 RX ADMIN — MICONAZOLE NITRATE: 2 POWDER TOPICAL at 21:42

## 2024-08-01 RX ADMIN — Medication 16 MCG/MIN: at 12:51

## 2024-08-01 RX ADMIN — AMOXICILLIN 500 MG: 500 CAPSULE ORAL at 21:30

## 2024-08-01 RX ADMIN — ARIPIPRAZOLE 5 MG: 2 TABLET ORAL at 07:48

## 2024-08-01 RX ADMIN — PROPOFOL 50 MCG/KG/MIN: 10 INJECTION, EMULSION INTRAVENOUS at 17:03

## 2024-08-01 RX ADMIN — HEPARIN SODIUM 1200 UNITS: 1000 INJECTION INTRAVENOUS; SUBCUTANEOUS at 14:19

## 2024-08-01 RX ADMIN — HEPARIN SODIUM 1200 UNITS: 1000 INJECTION INTRAVENOUS; SUBCUTANEOUS at 04:48

## 2024-08-01 RX ADMIN — PROPOFOL 50 MCG/KG/MIN: 10 INJECTION, EMULSION INTRAVENOUS at 01:32

## 2024-08-01 ASSESSMENT — PULMONARY FUNCTION TESTS
PIF_VALUE: 48
PIF_VALUE: 28
PIF_VALUE: 32
PIF_VALUE: 26
PIF_VALUE: 28
PIF_VALUE: 30
PIF_VALUE: 30
PIF_VALUE: 31
PIF_VALUE: 53
PIF_VALUE: 25
PIF_VALUE: 30
PIF_VALUE: 26
PIF_VALUE: 26
PIF_VALUE: 27
PIF_VALUE: 25
PIF_VALUE: 28
PIF_VALUE: 25
PIF_VALUE: 25
PIF_VALUE: 29
PIF_VALUE: 28
PIF_VALUE: 28
PIF_VALUE: 25
PIF_VALUE: 25
PIF_VALUE: 26
PIF_VALUE: 25
PIF_VALUE: 26
PIF_VALUE: 26
PIF_VALUE: 25
PIF_VALUE: 27
PIF_VALUE: 26
PIF_VALUE: 26
PIF_VALUE: 33
PIF_VALUE: 27
PIF_VALUE: 25
PIF_VALUE: 26
PIF_VALUE: 25
PIF_VALUE: 25
PIF_VALUE: 27
PIF_VALUE: 26
PIF_VALUE: 26
PIF_VALUE: 35
PIF_VALUE: 30
PIF_VALUE: 28
PIF_VALUE: 30
PIF_VALUE: 25
PIF_VALUE: 34
PIF_VALUE: 28
PIF_VALUE: 26
PIF_VALUE: 25
PIF_VALUE: 27
PIF_VALUE: 28
PIF_VALUE: 25
PIF_VALUE: 26
PIF_VALUE: 27
PIF_VALUE: 28
PIF_VALUE: 25
PIF_VALUE: 28
PIF_VALUE: 28
PIF_VALUE: 25
PIF_VALUE: 26
PIF_VALUE: 26
PIF_VALUE: 30
PIF_VALUE: 31
PIF_VALUE: 27
PIF_VALUE: 24
PIF_VALUE: 28
PIF_VALUE: 26
PIF_VALUE: 25
PIF_VALUE: 31
PIF_VALUE: 25
PIF_VALUE: 25
PIF_VALUE: 30
PIF_VALUE: 29

## 2024-08-01 ASSESSMENT — PAIN SCALES - GENERAL
PAINLEVEL_OUTOF10: 0

## 2024-08-01 NOTE — CONSULTS
Palliative Care Consult Note  Patient: Michelle Le  Gender: female  YOB: 1960  Unit/Bed: IC16/IC16-01  CodeStatus: Full Code  Inpatient Treatment Team: Treatment Team: Attending Provider: Werner Ortiz MD; Consulting Physician: Vinh Castellano MD; Consulting Physician: Gabriella Almazan MD; Consulting Physician: Eze White MD; Consulting Physician: Carmen Pelletier MD; Consulting Physician: Virgilio Howell DO; Registered Nurse: Linda Cox RN; : Ronna Santamaria; Utilization Reviewer: Della Mock RN; Consulting Physician: Rodger Holcomb MD  Admit Date:  7/27/2024    Chief Complaint: Blood loss anemia    History of Presenting Illness:      Michelle Le is a 64 y.o. female on hospital day 5 with a history of CAD s/p CABG/PCI, s/p TV repair, PAF/AFL, CVA with left sided weakness, IDDM2, ESRD on HD, anemia, schizophrenia, COVID-19 pneumonia, obesity, T11 fracture, hypogammaglobulinemia, E.Faecalis BSI in 7/2022, chronic left knee PJI/OM on chronic suppressive therapy with Amoxicillin.    Patient was brought to the emergency room via EMS from home for HD site bleeding. Patient hypotensive upon arrival. Patient was admitted in the setting of esrd, acute blood loss anemia, acute/chronic hypotension, ascites,     Palliative care was consulted by Dr. Ortiz for goals of care.     Patient is intubated and sedated, CRRT running. Patient was found to be in PEA, rosc achieved after 1 round compression performed. Required units of blood. Daughter currently at bedside with patient. Patient's previous functional status is criss lift per daughter at bedside. Patient has had overall functional decline in the last several months, multiple hospitalizations.  Patient had lengthy stay at [facility] went to [facility]. [facility] sent patient to [facility] and then patient was recently discharged home. Daughter provides 24/7 care. Daughter has been on dialysis for 1-2 years, has had frequent  intravenously for moderate sedation monitored under my direction.  Total intraservice time of sedation was 30 minutes.  The patient's vital signs were monitored throughout the procedure and recorded in the patient's medical record by the nurse. Mallapati score 2 ASA 3 FLUOROSCOPY DOSE AND TYPE: Radiation Exposure Index: 25.1 mGy, fluoro time 3.4 minutes DESCRIPTION OF PROCEDURE: Informed consent was obtained after a detailed explanation of the procedure including risks, benefits, and alternatives.  Universal protocol was observed. Sterile gowns, masks, hats and gloves utilized for maximal sterile barrier. When patient presented to angio suite she was actively bleeding for what appeared to be ulcerated wound overlying the fistula with bleeding that was sometimes pulsatile in nature.  The left arm was quickly prepped in sterile fashion using chlorhexidine maximum sterile barrier.  1% lidocaine was infiltrated around the bleeding wound as pressure was held on the fistula to decrease bleeding.  A 2-0 prolene suture was then placed in a pursestring fashion around the bleeding wound and hemostasis was achieved. Next ultrasound evaluation of the fistula was performed showing occlusion near the venous anastomosis.  No pseudoaneurysm was seen.  1% lidocaine was infiltrated and micropuncture access in antegrade direction was obtained in the mid fistula.  Micro sheath was inserted.  Through this a fistulogram was performed showing complete occlusion of the fistula at the venous anastomosis.  A 0.35 guidewire was then advanced over which a 6 Slovenian sheath was inserted.  Guidewire was able to be advanced through the occlusion into the subclavian vein.  Over wire through the sheath a 7 mm balloon was advanced and used to angioplasty the venous anastomosis where she had previous a stenosis.  During the angioplasty it was noted that the patient has O2 saturation, blood pressure and heart rate began to decrease.  The procedure was  markings are observed     1. Endotracheal tube 24.8 cm above the tabitha. 2. Left central venous catheter tip in the expected location of the left brachiocephalic vein. 3. Cardiomegaly with pulmonary vascular congestion.     CT HEAD WO CONTRAST    Result Date: 7/31/2024  EXAMINATION: CT OF THE HEAD WITHOUT CONTRAST  7/31/2024 2:32 pm TECHNIQUE: CT of the head was performed without the administration of intravenous contrast. Automated exposure control, iterative reconstruction, and/or weight based adjustment of the mA/kV was utilized to reduce the radiation dose to as low as reasonably achievable. COMPARISON: None. HISTORY: ORDERING SYSTEM PROVIDED HISTORY: Code blue TECHNOLOGIST PROVIDED HISTORY: Reason for exam:->Code blue Has a \"code stroke\" or \"stroke alert\" been called?->No What reading provider will be dictating this exam?->CRC FINDINGS: BRAIN/VENTRICLES: There is no acute intracranial hemorrhage, mass effect or midline shift.  No abnormal extra-axial fluid collection.  The gray-white differentiation is maintained without evidence of an acute infarct.  There is no evidence of hydrocephalus. ORBITS: The visualized portion of the orbits demonstrate no acute abnormality. SINUSES: The visualized paranasal sinuses and mastoid air cells demonstrate no acute abnormality. SOFT TISSUES/SKULL:  No acute abnormality of the visualized skull or soft tissues.     No acute intracranial abnormality.          Assessment and plan:  Palliative care encounter:   Explained the role of palliative care in treating patient. Discussed symptom management related to chronic disease/condition. Provided emotional support and active listening. Patient understands and is agreeable to current plan.   Lengthy discussion with Paulina, daughter/CÉSAR. Paulina reports that she previously has encouraged code status change and hospice philosophy with patient previously and patient was adamantly against it. Patient has met with hospice before. Daughter  wants to honor patient's wishes but is also aware of overall poor prognosis.   Palliative care will continue to follow during hospitalization.   Hospice:   -Patient is hospice eligible in the setting of ESRD, declining functional status with PPS at 20 percent, unintentional weight loss with hypoalbuminemia at 4 and multiple comorbidities. However, this not consistent with patient's goals of care.      Advance Care Planning: Discussed goals of care with patient. Explained in extensive detail nuances between full code, DNR CCA and DNR CC. Patient has made the decision to be FULL CODE  Patient has previously elected Angelina Fagan as HCPOA.  [phone]    Goals of Care Discussion: Disease process and goals of treatment were discussed in basic terms. Michelle's goal is to optimize available comfort care measures. We discussed the palliative care philosophy in light of those goals. We discussed all care options contingent on treatment response and QOL. Much active listening, presence, and emotional support were given.    I have discussed/reviewed the patient's case with nursing .    -Patient is currently being treated for multiple medical conditions by other providers  Pea cardiac arrest  Acute blood loss anemia  DM2  ESRD    MDM: HIGH    Electronically signed by LORETO Alvarado CNP on 8/1/2024 at 10:42 AM    Collaborating physician: Dr. Holland     Please note this report is partially produced by using speech recognition hardware.  It may contain errors related to the system, including grammar, punctuation and spelling as well as words and phrases that may seem inaccurate.  For any questions or concerns feel free to contact me for clarification.    Thanks for the opportunity you have allowed us to provide palliative care to Michelle Le. We will be in touch as care progresses. Please feel free to reach out to us should you have any questions or requests.

## 2024-08-01 NOTE — CONSULTS
Vascular Consult      Name: Michelle Le  MRN: [id number]       Physician Requesting Consult:      Milagros Hanna APRN - CNP       Reason for Consult:   non functioning avg    Chief Complaint:     Nonfunctioning AV graft    History of Present Illness:      Michelle Le is a 64 y.o.  female who presents with anemia and a nonfunctioning AV graft.  Patient had fistulogram with intervention with interventional radiology yesterday.  During the procedure she had a cardiopulmonary arrest and was intubated and sent to the ICU.  She currently is being dialyzed through a temporary right femoral dialysis catheter.  She is sedated due to severe agitation with nonpurposeful movements.    Patient was initially admitted on 7/28/2024 due to hypotension and bleeding from her dialysis site after she pulled the bandages off her access site.  She was transfused packed red blood cells and resuscitated with IV fluids.  Despite this she remained hypotensive and is chronically on midodrine for her heart failure.    Given the patient's mental status the history is gathered from the chart as well as the review of systems.    Past Medical History:     Past Medical History:   Diagnosis Date    Angina at rest ([facility]) 5/24/2020    Anxiety     CAD S/P percutaneous coronary angioplasty 2015, 2018    stents per Huong Sanabria    CHF (congestive heart failure) ([facility])     CKD (chronic kidney disease) stage 4, GFR 15-29 ml/min ([facility]) 2/24/2018    CKD stage 4 due to type 2 diabetes mellitus ([facility])     Contusion of right chest wall 2/16/2021    COPD (chronic obstructive pulmonary disease) ([facility])     Diabetic nephropathy with proteinuria ([facility]) 2014    DJD (degenerative joint disease) of knee     Dr Sharma    GERD (gastroesophageal reflux disease)     Hemiparesis, left ([facility]) 2013    entered Assisted Living ([place])    Hemodialysis patient ([facility])     Hemodialysis-associated hypotension 10/22/2021    History of heart failure      MD   pantoprazole (PROTONIX) 20 MG tablet Take 1 tablet by mouth daily    Felicita Bland MD   vitamin D (VITAMIN D3) 50 MCG (2000 UT) CAPS capsule Take 1 capsule by mouth daily    Felicita Bland MD   hydrocortisone (CORTEF) 5 MG tablet Take 1 tablet by mouth at bedtime    Felicita Bland MD   docusate sodium (COLACE, DULCOLAX) 100 MG CAPS Take 100 mg by mouth 2 times daily 4/12/24   Werner Ortiz MD   hydrocortisone (CORTEF) 10 MG tablet Take 2 tablets by mouth 2 times daily  Patient taking differently: Take 1 tablet by mouth daily 4/12/24   Werner Ortiz MD   apixaban (ELIQUIS) 2.5 MG TABS tablet Take 1 tablet by mouth 2 times daily  Patient taking differently: Take 1 tablet by mouth 2 times daily Indications: Preventative Treatment 1/31/24   Morteza Avitia MD   TREJOANNGY ELLIPTA 100-62.5-25 MCG/ACT AEPB inhaler Inhale 1 puff into the lungs daily 8/23/23   Gabriella Almazan MD   blood glucose test strips (FREESTYLE LITE) strip Use three time daily to test BS E11.65 7/26/23   Andrei Nino MD   blood glucose test strips (ASCENSIA AUTODISC VI;ONE TOUCH ULTRA TEST VI) strip 1 each by In Vitro route daily As needed. 7/14/23   Andrei Nino MD   Blood Glucose Monitoring Suppl (ONETOUCH VERIO REFLECT) w/Device KIT Give 1 meter dx E11.65 4/7/23   Andrei Nino MD   Blood Glucose Monitoring Suppl (ONETOUCH VERIO) w/Device KIT AS DIRECTED 4/5/23   Andrei Nino MD   Alcohol Swabs (EASY TOUCH ALCOHOL PREP MEDIUM) 70 % PADS USE AS DIRECTED THREE TIMES A DAY 4/5/23   Andrei Nino MD   melatonin 3 MG TABS tablet Take 1 tablet by mouth nightly as needed Indications: Trouble Sleeping    Felicita Bland MD   albuterol (PROVENTIL) 2.5 MG/0.5ML NEBU nebulizer solution Take 0.5 mLs by nebulization every 4 hours as needed for Wheezing or Shortness of Breath    Felicita Bland MD   b complex-C-folic acid (NEPHROCAPS) 1 MG capsule Take 1 capsule by mouth daily Indications: Dialysis Dependent Chronic Kidney  Failure    Provider, MD Felicita   hydrOXYzine HCl (ATARAX) 25 MG tablet Take 1 tablet by mouth 2 times daily Indications: Allergy 10/19/22   ProviderFelicita MD   Handicap Placard MISC by Does not apply route Expiration in 5 years. 3/16/22   Deann Castillo MD   blood glucose test strips (ONETOUCH VERIO) strip QID 12/13/21   Andrei Nino MD   OneTouch Delica Lancets 33G MISC QID 12/13/21   Andrei Nino MD   blood glucose test strips (FREESTYLE LITE) strip 1 each by Does not apply route 4 times daily (before meals and nightly) As needed. 3/12/21   Vinnie Smith PA   Blood Glucose Monitoring Suppl (FREESTYLE LITE) CORETTA 1 Device by Does not apply route daily as needed (Diabetes) Use freestyle meter to test blood sugar as needed 12/29/20   Vinnie Smith PA        Allergies:       Codeine and Oxycontin [oxycodone hcl]    Social History:     Tobacco:    reports that she has never smoked. She has never been exposed to tobacco smoke. She has never used smokeless tobacco.  Alcohol:      reports no history of alcohol use.  Drug Use:  reports no history of drug use.    Family History:     Family History   Problem Relation Age of Onset    Cancer Mother 68        survived    Breast Cancer Mother     Hypertension Father     Diabetes Sister     Mental Illness Sister     Colon Cancer Neg Hx        Review of Systems:     Positive and Negative as described in HPI          Physical Exam:     Vitals:  BP (!) 110/45   Pulse 68   Temp (!) 96.6 °F (35.9 °C) (Rectal)   Resp 18   Ht 1.727 m (5' 8\")   Wt 96.8 kg (213 lb 8 oz)   LMP  (LMP Unknown)   SpO2 95%   BMI 32.46 kg/m²       General appearance -intubated and sedated morbidly obese  Neck - supple, no carotid bruits, thyroid not palpable, no JVD  Chest -coarse breath sounds bilaterally  Heart - normal rate, regular rhythm  Abdomen - soft, non-tender, non-distended, bowel sounds present all four quadrants, no masses, hepatomegaly, splenomegaly or aortic

## 2024-08-01 NOTE — PROGRESS NOTES
[facility]   Facility/Department: [facility] ICU  Speech Language Pathology    NAME:Michelle Le  : 1960  ROOM: IC16/IC16-01      DATE: 2024      This patient was being seen by Speech Therapy for:  Dysphagia Treatment      However, due to this patient's change in medical status (pt currently intubated), Speech Therapy will require new orders to continue to follow the patient. Please re-order, as needed.   Discussed with Linda HUDDLESTON.      Thank you,  Randi Zavaleta, SLP, CCC-SLP, Date: 2024, Time: 9:58 AM

## 2024-08-01 NOTE — PLAN OF CARE
Problem: Discharge Planning  Goal: Discharge to home or other facility with appropriate resources  Outcome: Progressing  Flowsheets (Taken 8/1/2024 0730)  Discharge to home or other facility with appropriate resources: Identify barriers to discharge with patient and caregiver     Problem: Skin/Tissue Integrity  Goal: Absence of new skin breakdown  Description: 1.  Monitor for areas of redness and/or skin breakdown  2.  Assess vascular access sites hourly  3.  Every 4-6 hours minimum:  Change oxygen saturation probe site  4.  Every 4-6 hours:  If on nasal continuous positive airway pressure, respiratory therapy assess nares and determine need for appliance change or resting period.  Outcome: Not Progressing     Problem: Safety - Adult  Goal: Free from fall injury  Outcome: Progressing     Problem: ABCDS Injury Assessment  Goal: Absence of physical injury  Outcome: Progressing     Problem: Pain  Goal: Verbalizes/displays adequate comfort level or baseline comfort level  Outcome: Progressing     Problem: Chronic Conditions and Co-morbidities  Goal: Patient's chronic conditions and co-morbidity symptoms are monitored and maintained or improved  Outcome: Progressing  Flowsheets (Taken 8/1/2024 0730)  Care Plan - Patient's Chronic Conditions and Co-Morbidity Symptoms are Monitored and Maintained or Improved: Monitor and assess patient's chronic conditions and comorbid symptoms for stability, deterioration, or improvement     Problem: Safety - Medical Restraint  Goal: Remains free of injury from restraints (Restraint for Interference with Medical Device)  Description: INTERVENTIONS:  1. Determine that other, less restrictive measures have been tried or would not be effective before applying the restraint  2. Evaluate the patient's condition at the time of restraint application  3. Inform patient/family regarding the reason for restraint  4. Q2H: Monitor safety, psychosocial status, comfort, nutrition and  intake/output and perform bladder scan as needed     Problem: Infection - Adult  Goal: Absence of infection at discharge  Outcome: Progressing  Flowsheets (Taken 8/1/2024 0730)  Absence of infection at discharge: Assess and monitor for signs and symptoms of infection     Problem: Metabolic/Fluid and Electrolytes - Adult  Goal: Electrolytes maintained within normal limits  Outcome: Progressing  Flowsheets (Taken 8/1/2024 0730)  Electrolytes maintained within normal limits: Monitor labs and assess patient for signs and symptoms of electrolyte imbalances  Goal: Hemodynamic stability and optimal renal function maintained  Outcome: Not Progressing  Flowsheets (Taken 8/1/2024 0730)  Hemodynamic stability and optimal renal function maintained: Monitor labs and assess for signs and symptoms of volume excess or deficit  Goal: Glucose maintained within prescribed range  Outcome: Progressing  Flowsheets (Taken 8/1/2024 0730)  Glucose maintained within prescribed range: Monitor blood glucose as ordered     Problem: Hematologic - Adult  Goal: Maintains hematologic stability  Outcome: Progressing  Flowsheets (Taken 8/1/2024 0730)  Maintains hematologic stability: Assess for signs and symptoms of bleeding or hemorrhage     Problem: Skin/Tissue Integrity  Goal: Absence of new skin breakdown  Description: 1.  Monitor for areas of redness and/or skin breakdown  2.  Assess vascular access sites hourly  3.  Every 4-6 hours minimum:  Change oxygen saturation probe site  4.  Every 4-6 hours:  If on nasal continuous positive airway pressure, respiratory therapy assess nares and determine need for appliance change or resting period.  Outcome: Not Progressing     Problem: Skin/Tissue Integrity - Adult  Goal: Skin integrity remains intact  Outcome: Not Progressing  Flowsheets (Taken 8/1/2024 0730)  Skin Integrity Remains Intact: Monitor for areas of redness and/or skin breakdown     Problem: Metabolic/Fluid and Electrolytes - Adult  Goal:  Hemodynamic stability and optimal renal function maintained  Outcome: Not Progressing  Flowsheets (Taken 8/1/2024 2580)  Hemodynamic stability and optimal renal function maintained: Monitor labs and assess for signs and symptoms of volume excess or deficit

## 2024-08-01 NOTE — CARE COORDINATION
This LSW reviewed patients chart.  Patient was intubated after a code yesterday.    AFUAW/CM will follow  Electronically signed by HIPOLITO Mortensen on 8/1/24 at 2:50 PM EDT

## 2024-08-01 NOTE — PROGRESS NOTES
Nephrology Progress Note    Assessment:  64 y.o. year old female who presented to the emergency department for further evaluation and management of sudden onset of AV fistula bleed associated with hypotension with a blood pressure in the 50s admitted to critical care bed for further management though it did take place as per patient it seems like it did take place after pulling the bandage of the dialysis site  This clinical presentation did take place in a patient with past medical history of diabetes hypertension GERD COPD and she is on dialysis Monday Wednesday Friday anemia of chronic kidney disease secondary hyperparathyroidism and hyperphosphatemia     Patient still hypotensive with a blood pressure in the 99/38 no significant tachycardia no clinical or laboratory indication for dialysis  Careful examination of the patient AV fistula is functional left upper arm with felt thrill and heard bruit no evidence of active bleeding patient did improve her hemoglobin after packed RBCs transfusion    Now s/p cardiac arrest w/ ROSC following 1 round of CPR which occurred during fistulagram, venous outflow stenosis noted however procedure stopped due to code, started on CRRT 7/31 via temp catheter         Plan:  - continue CRRT, increasing UF to net negative 50 ml/hr, will increase as tolerated as pt is fluid overloaded        Patient Active Problem List:     Coronary artery disease involving native coronary artery of native heart with angina pectoris (HCC)     Schizophrenia, paranoid, chronic     Metabolic syndrome     Vitamin B 12 deficiency     Cerebral microvascular disease     Mixed hyperlipidemia     Other hammer toe (acquired)     Vitamin D insufficiency     Incontinence of urine     Diabetic nephropathy with proteinuria (HCC)     Essential (primary) hypertension     History of type C viral hepatitis     Urinary incontinence due to cognitive impairment     History of seizures     Stented coronary artery-plan is to

## 2024-08-01 NOTE — INTERDISCIPLINARY ROUNDS
Spiritual Care Services     Summary of Visit:   participated in ICU rounds. Patient intubated after a code yesterday. No family present.    Encounter Summary  Encounter Overview/Reason: Interdisciplinary rounds  Service Provided For: Patient  Referral/Consult From: Multi-disciplinary team  Support System: Children  Last Encounter : 07/29/24  Complexity of Encounter: High  Begin Time: 1420  End Time : 1510  Total Time Calculated: 50 min     Crisis  Type: Code Blue                         Spiritual Assessment/Intervention/Outcomes:    Assessment: Unable to assess    Intervention: Sustaining Presence/Ministry of presence    Outcome: Did not respond      Care Plan:    Plan and Referrals  Plan/Referrals: Continue to visit, (comment)    Provide additional spiritual support as needed or requested      Spiritual Care Services   Electronically signed by Chaplain Pearl on 8/1/2024 at 2:11 PM.    To reach a  for emotional and spiritual support, place an EPIC consult request.   If a  is needed immediately, dial “0” and ask to page the on-call .

## 2024-08-01 NOTE — PLAN OF CARE
Problem: Discharge Planning  Goal: Discharge to home or other facility with appropriate resources  8/1/2024 1954 by Amaya Castaneda RN  Outcome: Progressing  8/1/2024 1243 by Linda Cox RN  Outcome: Progressing  Flowsheets (Taken 8/1/2024 0730)  Discharge to home or other facility with appropriate resources: Identify barriers to discharge with patient and caregiver     Problem: Skin/Tissue Integrity  Goal: Absence of new skin breakdown  Description: 1.  Monitor for areas of redness and/or skin breakdown  2.  Assess vascular access sites hourly  3.  Every 4-6 hours minimum:  Change oxygen saturation probe site  4.  Every 4-6 hours:  If on nasal continuous positive airway pressure, respiratory therapy assess nares and determine need for appliance change or resting period.  8/1/2024 1954 by Amaya Castaneda RN  Outcome: Progressing  8/1/2024 1243 by Linda Cox RN  Outcome: Not Progressing     Problem: Safety - Adult  Goal: Free from fall injury  8/1/2024 1954 by Amaya Castaneda RN  Outcome: Progressing  8/1/2024 1243 by Linda Cox RN  Outcome: Progressing     Problem: ABCDS Injury Assessment  Goal: Absence of physical injury  8/1/2024 1954 by Amaya Castaneda RN  Outcome: Progressing  8/1/2024 1243 by Linda Cox RN  Outcome: Progressing     Problem: Pain  Goal: Verbalizes/displays adequate comfort level or baseline comfort level  8/1/2024 1954 by Amaya Castaneda RN  Outcome: Progressing  8/1/2024 1243 by Linda Cox RN  Outcome: Progressing     Problem: Chronic Conditions and Co-morbidities  Goal: Patient's chronic conditions and co-morbidity symptoms are monitored and maintained or improved  8/1/2024 1954 by Amaya Castaneda RN  Outcome: Progressing  8/1/2024 1243 by Linda Cox RN  Outcome: Progressing  Flowsheets (Taken 8/1/2024 0730)  Care Plan - Patient's Chronic Conditions and Co-Morbidity Symptoms are Monitored and Maintained or Improved: Monitor and assess patient's  Adult  Goal: Skin integrity remains intact  8/1/2024 1954 by Amaya Castaneda RN  Outcome: Progressing  8/1/2024 1243 by Linda Cox RN  Outcome: Not Progressing  Flowsheets (Taken 8/1/2024 0730)  Skin Integrity Remains Intact: Monitor for areas of redness and/or skin breakdown     Problem: Musculoskeletal - Adult  Goal: Return mobility to safest level of function  8/1/2024 1954 by Amaya Castaneda RN  Outcome: Progressing  8/1/2024 1243 by Linda Cox RN  Outcome: Progressing     Problem: Gastrointestinal - Adult  Goal: Minimal or absence of nausea and vomiting  8/1/2024 1954 by Amaya Castaneda RN  Outcome: Progressing  8/1/2024 1243 by Linda Cox RN  Outcome: Progressing  Flowsheets (Taken 8/1/2024 0730)  Minimal or absence of nausea and vomiting: Nasogastric tube to low intermittent suction as ordered  Goal: Maintains or returns to baseline bowel function  8/1/2024 1954 by Amaya Castaneda RN  Outcome: Progressing  8/1/2024 1243 by Linda Cox RN  Outcome: Progressing  Flowsheets (Taken 8/1/2024 0730)  Maintains or returns to baseline bowel function: Assess bowel function  Goal: Maintains adequate nutritional intake  8/1/2024 1954 by Amaya Castaneda RN  Outcome: Progressing  8/1/2024 1243 by Linda Cox RN  Outcome: Progressing  Flowsheets (Taken 8/1/2024 0730)  Maintains adequate nutritional intake: Monitor intake and output, weight and lab values     Problem: Genitourinary - Adult  Goal: Absence of urinary retention  8/1/2024 1954 by Amaya Castaneda RN  Outcome: Progressing  8/1/2024 1243 by Linda Cox RN  Outcome: Progressing  Flowsheets (Taken 8/1/2024 0730)  Absence of urinary retention: Monitor intake/output and perform bladder scan as needed     Problem: Infection - Adult  Goal: Absence of infection at discharge  8/1/2024 1954 by Amaya Castaneda RN  Outcome: Progressing  8/1/2024 1243 by Linda Cox RN  Outcome: Progressing  Flowsheets (Taken 8/1/2024 0730)  Absence  RN  Outcome: Not Progressing     Problem: Skin/Tissue Integrity - Adult  Goal: Skin integrity remains intact  8/1/2024 1954 by Amaya Castaneda RN  Outcome: Progressing  8/1/2024 1243 by Linda Cox RN  Outcome: Not Progressing  Flowsheets (Taken 8/1/2024 0730)  Skin Integrity Remains Intact: Monitor for areas of redness and/or skin breakdown     Problem: Metabolic/Fluid and Electrolytes - Adult  Goal: Hemodynamic stability and optimal renal function maintained  8/1/2024 1954 by Amaya Castaneda RN  Outcome: Progressing  8/1/2024 1243 by Linda Cox RN  Outcome: Not Progressing  Flowsheets (Taken 8/1/2024 0730)  Hemodynamic stability and optimal renal function maintained: Monitor labs and assess for signs and symptoms of volume excess or deficit

## 2024-08-01 NOTE — PROGRESS NOTES
Pulmonary & Critical Care Medicine ICU Progress Note  Chief complaint : Hypotension     Subjunctive/24 hour events :   Patient seen and examined during multidisciplinary rounds with RN, charge nurse, RT, pharmacy, dietitian, and social service.   Patient was a code blue yesterday afternoon during her fistulagram. Stated that she went into PEA and was intubated, one round of CPR and one epi given. ROSC was achieved. She is currently on 30% with peep of 5 and O2 sats are 94%. Sedated with fentanyl and propofol. Requiring levophed at 14 mcg/min. CRRT was started yesterday. No fever overnight and anuric. NPO and last BM 7.29/2024.       Social History     Tobacco Use    Smoking status: Never     Passive exposure: Never    Smokeless tobacco: Never   Substance Use Topics    Alcohol use: No     Alcohol/week: 0.0 standard drinks of alcohol         Problem Relation Age of Onset    Cancer Mother 68        survived    Breast Cancer Mother     Hypertension Father     Diabetes Sister     Mental Illness Sister     Colon Cancer Neg Hx        Recent Labs     08/01/24  0342 08/01/24  0503   PHART 7.538* 7.510*   [id number] 25* 28*   PO2ART 94 98       MV Settings:  Vent Mode: AC/VC Resp Rate (Set): 16 bpm/Vt (Set, mL): 350 mL/ /FiO2 : 30 %           IV:   sodium chloride      sodium chloride      fentaNYL 275 mcg/hr (08/01/24 0915)    propofol 50 mcg/kg/min (08/01/24 0915)    prismaSATE BGK 4/2.5 1,000 mL/hr at 07/31/24 2351    prismaSATE BGK 4/2.5 1,000 mL/hr at 07/31/24 2345    sodium chloride      sodium chloride      norepinephrine 14 mcg/min (08/01/24 0915)    sodium chloride      dextrose         Vitals:  BP (!) 97/39   Pulse 70   Temp (!) 96.6 °F (35.9 °C) (Rectal)   Resp 23   Ht 1.727 m (5' 7.99\")   Wt 96.8 kg (213 lb 8 oz)   LMP  (LMP Unknown)   SpO2 96%   BMI 32.47 kg/m²    Tmax:       Intake/Output Summary (Last 24 hours) at 8/1/2024 0948  Last data filed at 8/1/2024 0915  Gross per 24 hour   Intake 1535.68 ml    Output 527 ml   Net 1008.68 ml         EXAM:    General: sedated   Head: normocephalic, atraumatic  Eyes:No gross abnormalities.  ENT:  MMM no lesions  Neck:  supple and no masses  Chest : Fair air movement occ rales no wheezes   Heart:: Heart sounds are normal.  Regular rate and rhythm without murmur, gallop or rub.  ABD:  bowel sounds normal, soft, non-tender  Musculoskeletal : no cyanosis, no clubbing, and trace edema  Neuro:  sedated   Skin: No rashes or nodules noted.  Lymph node:  no cervical nodes  Urology: No Reynolds   Psychiatric: calm     Medications:  Scheduled Meds:   miconazole   Topical BID    chlorhexidine  15 mL Mouth/Throat BID    insulin lispro  0-16 Units SubCUTAneous Q4H    pantoprazole (PROTONIX) 40 mg in sodium chloride (PF) 0.9 % 10 mL injection  40 mg IntraVENous Daily    sodium chloride flush  5-40 mL IntraVENous 2 times per day    sodium chloride flush  5-40 mL IntraVENous 2 times per day    amoxicillin  500 mg Oral 2 times per day    Virt-Caps  1 capsule Oral Daily    Vitamin D  2,000 Units Oral Daily    ARIPiprazole  5 mg Oral Daily    fludrocortisone  0.1 mg Oral BID    sertraline  50 mg Oral Daily    miconazole   Topical BID    epoetin chadwick-epbx  8,000 Units SubCUTAneous Once per day on Mon Wed Fri    sodium chloride flush  5-40 mL IntraVENous 2 times per day    insulin glargine  35 Units SubCUTAneous Nightly    hydrocortisone  5 mg Oral Nightly    hydrocortisone  10 mg Oral Daily    midodrine  20 mg Oral TID WC    [Held by provider] budesonide-formoterol  2 puff Inhalation BID RT    And    [Held by provider] tiotropium  2 puff Inhalation Daily RT    pill splitter   Does not apply Once       PRN Meds:  sodium chloride, perflutren lipid microspheres, sodium chloride, fentaNYL **AND** fentaNYL, ipratropium 0.5 mg-albuterol 2.5 mg, heparin (porcine), heparin (porcine) **AND** heparin (porcine), naloxone 0.4 mg in 10 mL sodium chloride syringe, sodium chloride flush, sodium chloride,  ascitic fluid.    Soft Tissues/Bones: There is mild compression deformity of an upper lumbar  vertebral body.    Impression  1. Resolution of the previously seen air in the right atrium.  2. Advanced cardiomegaly with small bilateral pleural effusions and slight  lower lung zone interlobular septal thickening.  3. Slight nodularity of the contour of the liver with mild perihepatic and  perisplenic ascitic fluid.  4. Mild compression deformity of an upper lumbar vertebral body.      CXR      2-view: Results for orders placed during the hospital encounter of 06/30/22    XR CHEST (2 VW)    Narrative  EXAMINATION:  CHEST.    CLINICAL HISTORY:  CONFUSION.    COMPARISONS:  6/2/2022.    TECHNIQUE:  AP and lateral.    FINDINGS: There is mild to moderate cardiomegaly. No definite evidence of pulmonary venous congestion. Chronic changes are noted in the left lower thorax. There is a dialysis catheter in place. There is a valve prosthesis in place. There are small  pleural effusions or blunting of both costophrenic angles.    Impression  NO SIGNIFICANT CHANGE SINCE THE PREVIOUS EXAM. THERE HAS BEEN PLACEMENT OF A DIALYSIS CATHETER SINCE THAT EXAM.      Results for orders placed during the hospital encounter of 04/20/22    XR CHEST (2 VW)    Narrative  EXAMINATION: XR CHEST (2 VW)    CLINICAL HISTORY: ABDOMINAL PAIN    COMPARISONS: CHEST RADIOGRAPH APRIL 8, 2022    FINDINGS: Median sternotomy.. Cardiopericardial silhouette upper limits normal, unchanged. Pulmonary vasculature normal. Right lung clear. Blunting left costophrenic angle again identified. Ill-defined area increased opacity lateral left lower chest  decreased since prior study.    Impression  BORDERLINE CARDIOMEGALY. LEFT PLEURAL EFFUSION VERSUS THICKENING. LEFT LOWER LUNG SCARRING VERSUS ATELECTASIS/PNEUMONIA.       Portable: Results for orders placed during the hospital encounter of 04/16/24    XR CHEST PORTABLE    Narrative  EXAMINATION:  ONE XRAY VIEW OF THE  8/1/2024 at 9:48 AM

## 2024-08-01 NOTE — PROGRESS NOTES
Comprehensive Nutrition Assessment    Type and Reason for Visit:  Initial, Consult (tube feeding order and manage- new vent)    Nutrition Recommendations/Plan:   Begin trophic rate tube feeding  Peptide based formula ( Vital 1.2) @ 20 ml/hr x 24 hrs, via OG  Water flushes 50 ml every 8 hrs, until hyponatremia improves     Malnutrition Assessment:  Malnutrition Status:  At risk for malnutrition (Comment) (08/01/24 1102)    Context:  Chronic Illness     Findings of the 6 clinical characteristics of malnutrition:  Energy Intake:  Mild decrease in energy intake (Comment)  Weight Loss:  Unable to assess (due to edema)     Body Fat Loss:  No significant body fat loss     Muscle Mass Loss:  Unable to assess    Fluid Accumulation:  Mild Extremities, Ascites   Strength:  Not Performed    Nutrition Assessment:     Unable to determine nutritional status PTA, based on available information, pt now intubated, will begin trophic tube feeding and monitor medical plan of care    Nutrition Related Findings:    \" hx includes :dm2, copd, ESRD (hemodialysis-anuric), cirrhosis..who presents from home after pulling bandage off dialysis site and had significant bleeding from the site..GI consult for recurrent large volume ascites Patient carries a diagnosis of cirrhosis, she requires large-volume paracentesis as needed ( 7/8/24 = 7600 ml removed), required intubation ( 7/31),after cardiac arrest; OG placed, sedated with fentanyl & propofol @ 29.1 ml/hr ( ~770 kcals, ) Levophed @ 15 ml/hr, No IVF, appears anuric, currently on CRRT, ( +4 liters since admit) with moderate-severe generalized edema present, last BM 7/29, Abnormal labs noted ( + hyponatremia, ), meds reviewed, oral intake was poor pta Wound Type: None       Current Nutrition Intake & Therapies:    Average Meal Intake: NPO  Average Supplements Intake: NPO  ADULT TUBE FEEDING; Nasogastric; Peptide Based; Continuous; 20; No; 50; Q 4 hours  Current Tube Feeding (TF)  Orders:  Feeding Route: Orogastric  Formula: Peptide Based (Vital1.2)  Schedule: Continuous  Feeding Regimen: 20 ml/hr = 480 ml  Additives/Modulars: None  Water Flushes: 50 every 8 ( 150)  Current TF & Flush Orders Provides: 576 kcals ( + propofol ) 36 g protein , ~540 ml free water  Goal TF & Flush Orders Provides: 576 kcals ( + propofol ) 36 g protein , ~540 ml free water    Anthropometric Measures:  Height: 172.7 cm (5' 8\")  Ideal Body Weight (IBW): 140 lbs (64 kg)    Admission Body Weight: 89.4 kg (197 lb)  Current Body Weight: 96.6 kg (213 lb) (* edema), 152.1 % IBW. Weight Source: Bed Scale  Current BMI (kg/m2): 32.4  Usual Body Weight: 98.4 kg (217 lb) (( 2/2024), wt varies 190-220's dependent on fluid status)  % Weight Change (Calculated): -1.8                    BMI Categories: Obese Class 1 (BMI 30.0-34.9)    Estimated Daily Nutrient Needs:  Energy Requirements Based On: Kcal/kg  Weight Used for Energy Requirements: Admission  Energy (kcal/day): 0029-5629 kcals @ 20-22 kcal/kg  Weight Used for Protein Requirements: Ideal  Protein (g/day): ~ 96 g protein  Method Used for Fluid Requirements: ml/Kg  Fluid (ml/day): ~1600 ml @ 25 ml/kg IBW- or per physician    Nutrition Diagnosis:   Inadequate oral intake related to impaired respiratory function as evidenced by intubation  Altered nutrition-related lab values related to endocrine dysfuntion, renal dysfunction as evidenced by lab values    Nutrition Interventions:   Food and/or Nutrient Delivery: Start Tube Feeding  Nutrition Education/Counseling: Education not indicated  Coordination of Nutrition Care: Continue to monitor while inpatient       Goals:     Goals: Initiate nutrition support, by next RD assessment       Nutrition Monitoring and Evaluation:   Behavioral-Environmental Outcomes: None Identified  Food/Nutrient Intake Outcomes: Diet Advancement/Tolerance, Food and Nutrient Intake, Enteral Nutrition Intake/Tolerance  Physical Signs/Symptoms Outcomes:

## 2024-08-01 NOTE — PROGRESS NOTES
0730- Patient intubated and sedated. On propofol and fentanyl for sedation, see MAR. Pt. Does respond to pain, becomes restless with stimulation/patient care, requiring increase in sedation. During these episodes, pt. Has slight non-purposeful movement of extremities, coughs against ETT, desats to 80's, BP drops temporarily, and alarms high pressures on CRRT. Pt.'s pupils equal but nonreactive. Does have cough and gag present.   Pt. On levophed, on higher dose compared to yesterday. Pt. Needing increase in levo as more fluid is removed. Pt. Previously net 0cc fluid removal, ordered to increase fluid removal to 50cc net hourly, per Dr. Larios.   No sign of bleeding from fistula or lines.   Pt. Currently sinus rhythm on bedside monitor. Rate is regular, but occasionally will be briefly irregular.   Pt. On arctic sun. Currently being cooled, goal of 36 degrees.     1130- pt. Remains intubated/sedated. Pupils slightly react to light, very sluggish. TF started.     Approx. 1400, possibly due to patient positioning, CRRT alarming high pressures. After repositioning pt. And restarting machine, machine clotted off.   [facility] was called by unit secretary for new setup.   Also sent message to Dr. Larios to inform him of CRRT clotted off and waiting for [facility] for new setup. Confirmed m150 filter with him. Stated he will order pre blood pump. See new orders.     1600- pt. Finished cooling of 36 degrees for 24 hours, pt. To begin rewarming, to 37 degrees over 4 hours.  ABG's reported by RT to Dr. Marsh.   Vent changes made by Dr. Marsh.     About 1800 repeat ABG done, 2 hours after vent changes.   Called Dr. Marsh to report ABG. Versed drip and vent changes ordered. ABG to be done again in 2 hours.     Daughter at bedside voicing concern regarding increased sedation. Discussed with her that multiple vent changes have been made to try to improve blood gases, last night and today. Pt. Continues to breathe  over vent. Daughter states \"she will never wake up\" and states that last time pt. Was intubated, it took her \"3 days\" to wake up. Also discussed with her that pt.'s condition is different at this time than during previous intubation of another admission. Discussed benefits of increased sedation at this time for prioritizing needs of patient's health. Daughter states she understands, but is still concerned and would like to speak with physician tomorrow.     Versed drip started.     1900- handoff report given at bedside. Dialysis RN also at bedside to restart CRRT.

## 2024-08-01 NOTE — PROGRESS NOTES
Hospitalist Progress Note      PCP: Adilene Terry MD    Date of Admission: 7/27/2024    Chief Complaint:  patient suffered cardiac arrest yesterday while having fistulogram done by IR, rhythm was PEA, CPR started, ROSC was obtained after 1 round of CPR and Epinephrine, intubated, CT head was negative, returned to ICU, started on targeted temperature  protocol and CRRT, remains intubated, sedated with Propofol and Fentanyl, hypotensive requiring Norepinephrine support    Medications:  Reviewed    Infusion Medications    sodium chloride      sodium chloride      fentaNYL 275 mcg/hr (08/01/24 0915)    propofol 50 mcg/kg/min (08/01/24 0915)    prismaSATE BGK 4/2.5 1,000 mL/hr at 07/31/24 2351    prismaSATE BGK 4/2.5 1,000 mL/hr at 07/31/24 2345    sodium chloride      sodium chloride      norepinephrine 14 mcg/min (08/01/24 0915)    sodium chloride      dextrose       Scheduled Medications    miconazole   Topical BID    chlorhexidine  15 mL Mouth/Throat BID    insulin lispro  0-16 Units SubCUTAneous Q4H    pantoprazole (PROTONIX) 40 mg in sodium chloride (PF) 0.9 % 10 mL injection  40 mg IntraVENous Daily    sodium chloride flush  5-40 mL IntraVENous 2 times per day    sodium chloride flush  5-40 mL IntraVENous 2 times per day    amoxicillin  500 mg Oral 2 times per day    Virt-Caps  1 capsule Oral Daily    Vitamin D  2,000 Units Oral Daily    ARIPiprazole  5 mg Oral Daily    fludrocortisone  0.1 mg Oral BID    sertraline  50 mg Oral Daily    miconazole   Topical BID    epoetin chadwick-epbx  8,000 Units SubCUTAneous Once per day on Mon Wed Fri    sodium chloride flush  5-40 mL IntraVENous 2 times per day    insulin glargine  35 Units SubCUTAneous Nightly    hydrocortisone  5 mg Oral Nightly    hydrocortisone  10 mg Oral Daily    midodrine  20 mg Oral TID WC    [Held by provider] budesonide-formoterol  2 puff Inhalation BID RT    And    [Held by provider] tiotropium  2 puff Inhalation Daily RT    pill splitter   Does not  infusion, Midodrine, Hydrocortisone, Florinef  - followed by critical care service    Ascites   - requiring large volume paracentesis x 2 in 4/24 and 7/8  - CT of abd/pelvis showed large volume ascites, small bilateral pleural effusions and anasarca   - s/p paracentesis on 7/30 with drainage of 4 liters of fluid  - fluid cell count not suggestive of SBP  - likely due to cardiac etiology per GI service     ESRD on IHD  - fistulogram not completed due to cardiac arrest  - started on CRRT  - followed by nephrology     PAF/AFL  - holding Apixaban due to bleeding  - monitor on telemetry     Hx of CAD s/p CABG/PCI, CVA  - no ASA due to bleeding     IDDM with hyperglycemia  - on ISS, Lantus     Chronic left knee PJI/OM   - continue chronic suppressive therapy (Amoxicillin)    Diet: ADULT TUBE FEEDING; Nasogastric; Peptide Based; Continuous; 20; No; 50; Q 4 hours    Code Status: Full Code          Electronically signed by WILEY BARROW MD on 8/1/2024 at 10:00 AM

## 2024-08-01 NOTE — PROGRESS NOTES
Vascular Medicine and Interventional Radiology:    PROGRESS NOTE:       Michelle Le  : 1960  MR #: [id number]       PCP:  Adilene Terry MD     Attending Physician: Werner Ortiz MD     Date of Admission: 2024  9:22 AM     Chief Complaint:   Chief Complaint   Patient presents with    Hypotension     Picked at HD site and started bleeding, hypotensive when EMS arrived      SUBJECTIVE:   Seen and examined in ICU. S/P fistulagram with Dr. Carmona 2024. Patient was a code blue during fistulagram therefore procedure was aborted, she was intubated, received 1 dose of epi and 1 round of compressions, ROSC achieved and she was transferred back to the ICU. Temporary femoral line placed and she was started on CRRT. She remains intubated, sedated, on mechanical ventilation.    Past Medical History:   has a past medical history of Angina at rest ([facility]), Anxiety, CAD S/P percutaneous coronary angioplasty, CHF (congestive heart failure) ([facility]), CKD (chronic kidney disease) stage 4, GFR 15-29 ml/min ([facility]), CKD stage 4 due to type 2 diabetes mellitus ([facility]), Contusion of right chest wall, COPD (chronic obstructive pulmonary disease) ([facility]), Diabetic nephropathy with proteinuria ([facility]), DJD (degenerative joint disease) of knee, GERD (gastroesophageal reflux disease), Hemiparesis, left ([facility]), Hemodialysis patient ([facility]), Hemodialysis-associated hypotension, History of heart failure, History of seizures, History of type C viral hepatitis, HTN (hypertension), Hyperlipidemia, Impaired mobility and activities of daily living, Infection of venous access port, Mediastinal lymphadenopathy, Metabolic syndrome, Moderate persistent asthma without complication, Need for extended care facility, Neurogenic urinary incontinence, Neuropathy in diabetes ([facility]), Nonrheumatic mitral valve regurgitation, Nonrheumatic tricuspid valve regurgitation, Obesity (BMI 30-39.9), Recurrent UTI, S/P colonoscopy, Schizophrenia, paranoid,   Yes ProviderFelicita MD   fludrocortisone (FLORINEF) 0.1 MG tablet Take 1 tablet by mouth 2 times daily    Felicita Bland MD   LANCHANDAUS SOLOSTAR 100 UNIT/ML injection pen INJECT 35 UNITS SUBCUTANEOUSLY AT BEDTIME 7/24/24   Vinnie Smith PA   insulin lispro, 1 Unit Dial, (HUMALOG KWIKPEN) 100 UNIT/ML SOPN inject 3 times daily before meals- 120-140 2 units, 141-160 3 units, 161-180 4 units, 181-200 5 units, 201-220 6 units, 221-240 7 units, 241-260 8 units, 261-280 9 units, 281-300 10 units, 301 or higher 11 units. 7/24/24   Vinnie Smith PA   FreeStyle Lancets MISC Test 4x daily 7/24/24   Vinnie Smith PA   Continuous Glucose Sensor (DEXCOM G7 SENSOR) MISC 1 Device by Does not apply route every 10 days 7/24/24   Vinnie Smith PA   Alcohol Swabs (ALCOHOL PREP) 70 % PADS 1 Device by Does not apply route 4 times daily (before meals and nightly) 7/24/24   Vinnie Smith PA   Insulin Pen Needle 32G X 6 MM MISC 1 Device by Does not apply route 4 times daily (before meals and nightly) 7/24/24   Vinnie Smith PA   blood glucose monitor strips 1 strip by Other route 4 times daily (before meals and nightly) Pt test 4x daily Dx E11.65.  May substitute for generic or insurance covered product 7/24/24   Vinnie Smith PA   blood glucose monitor kit and supplies 1 kit by Other route 4 times daily (before meals and nightly) Pt test 4x daily Dx E11.65.  May substitute for generic or insurance covered product 7/24/24   Vinnie Smith PA   midodrine (PROAMATINE) 10 MG tablet Take 2 tablets by mouth 3 times daily (with meals) 7/21/24   Virgilio Howell DO   amoxicillin (AMOXIL) 500 MG capsule TAKE ONE (1) CAPSULE BY MOUTH TWICE DAILY 7/10/24   Autumn Rojo MD   mupirocin (BACTROBAN) 2 % ointment Apply topically 2 times daily Apply to AA topically 2 times daily.    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  Per daughter, her brother is blind, MRDD, CP multiple health issues.  Daughter's  is patient's legal guardian.    Patient has hemodialysis Tuesday Thursday and Saturday.  Patient's bedroom is on main floor with a half bath.  Daughter walks patient upstairs once weekly for full bath.  Patient is using her walker in the home.  Patient has a hospital bed in the home.     Social Determinants of Health     Financial Resource Strain: Low Risk  (7/29/2022)    Overall Financial Resource Strain (CARDIA)     Difficulty of Paying Living Expenses: Not hard at all   Food Insecurity: No Food Insecurity (7/27/2024)    Hunger Vital Sign     Worried About Running Out of Food in the Last Year: Never true     Ran Out of Food in the Last Year: Never true   Transportation Needs: No Transportation Needs (7/27/2024)    PRAPARE - Transportation     Lack of Transportation (Medical): No     Lack of Transportation (Non-Medical): No   Physical Activity: Sufficiently Active (5/13/2022)    Exercise Vital Sign     Days of Exercise per Week: 3 days     Minutes of Exercise per Session: 60 min   Stress: Not on file   Social Connections: Not on file   Intimate Partner Violence: Not on file   Housing Stability: Low Risk  (7/27/2024)    Housing Stability Vital Sign     Unable to Pay for Housing in the Last Year: No     Number of Places Lived in the Last Year: 1     Unstable Housing in the Last Year: No        ROS:   Review of

## 2024-08-01 NOTE — PROGRESS NOTES
Gastroenterology Progress Note    Michelle Le is a 64 y.o. female patient.  Hospitalization Day:5    Chief C/O: ascites, cirrhosis    SUBJECTIVE: Seen and examined in the intensive care unit, now intubated sedated on pressors and CRRT, plan of care discussed with nursing, hemoglobin 9.0, no overt bleeding.    ROS:  Gastrointestinal ROS: deferred    Physical    VITALS:  BP (!) 97/39   Pulse 73   Temp (!) 96.6 °F (35.9 °C) (Rectal)   Resp 20   Ht 1.727 m (5' 7.99\")   Wt 96.8 kg (213 lb 8 oz)   LMP  (LMP Unknown)   SpO2 93%   BMI 32.47 kg/m²   TEMPERATURE:  Current - Temp: (!) 96.6 °F (35.9 °C); Max - Temp  Av.9 °F (36.1 °C)  Min: 96.3 °F (35.7 °C)  Max: 97.8 °F (36.6 °C)    General: Intubated and sedated  Skin- without jaundice  Eyes: anicteric sclera  Cardiac: RRR, Nl s1s2, without murmurs  Lungs intubated and ventilated, CTA Bilaterally, normal effort  Abdomen soft, ND, NT, no HSM, Bowel sounds normal  Ext: without edema  Neuro: Sedated    Data    Data Review:    Recent Labs     24  0500 24  0600 24  1224 24  1442 24  1752 24  2157 24  0352 24  0503 24  0952   WBC 13.3* 11.0*  --   --   --   --  13.1*  --   --    HGB 7.3* 6.5* 7.5*   < > 8.9*   < > 9.0* 8.2* 9.0*   HCT 23.0* 20.0* 22.1*  --  26.0*  --  25.1*  --   --    MCV 89.1 91.3  --   --   --   --  84.2  --   --     170  --   --   --   --  144  --   --     < > = values in this interval not displayed.     Recent Labs     24  1545 24  2145 24  2157 24  0345 24  0503 24  0952   * 130*  --  129*  --   --    K 4.7 4.2  --  3.8  --   --    CL 90* 92*  --  92*  --   --    CO2 17* 19*  --  21  --   --    PHOS 5.3* 3.7  --  2.8  --   --    BUN 46* 39*  --  32*  --   --    CREATININE 6.82* 5.62*   < > 4.69* 4.6* 4.1*    < > = values in this interval not displayed.     Recent Labs     24  1044   AST 14   ALT 7   BILIDIR 0.9*   BILITOT 1.4*  disease on CRRT  Ascitic fluid is negative for SBP, No Hx of SBP noted  Not candidate for elective TIPS at this time  -cardiac optimization  -therapeutic LVP as needed  3- EGD for Variceal surveillance:    Had EGD 4/24 no endoscopic evidence of varices, bleeding, ulceration, mass or erythema or inflammatory changes suggestive of gastritis in the entire examined stomach.  Hgb 9.0 no overt GIB  4 - HCC screening:   Recent imaging Negative for liver mass    Thank you for allowing me to participate in the care of your patient.  After 5 pm today Dr Hairston is covering hospital call for GI/hepatology, please feel free to reach out to the answering service for any related questions or concerns   Ronna Aquino, APRN - CNP

## 2024-08-01 NOTE — CONSULTS
DATE OF CONSULTATION  8/1/2024    CONSULTANT  Virgilio Howell DO     REQUESTING PHYSICIAN  Werner Ortiz MD         REASON FOR CONSULTATION  Chief Complaint   Patient presents with    Hypotension     Picked at HD site and started bleeding, hypotensive when EMS arrived       Hospital Day: 5       Patient is a 64 y.o. female who presents with a chief complaint of hypotension. Patient is followed on a regular basis by Adilene Tom MD. patient with multiple medical problems who presented with hypotension from dialysis center in the setting of acute bleeding from hemodialysis access she is currently on the vent and sedated.  Most of the information was obtained from the staff as well as the chart.  Patient apparently had a PEA arrest during IR intervention.  Had 1 round of CPR and 1 mg of epinephrine given  Patient with history of paroxysmal atrial fibrillation.  Had brief episode of A-fib however currently she is in normal sinus rhythm on telemetry    Past Medical History:   Diagnosis Date    Angina at rest ([facility]) 5/24/2020    Anxiety     CAD S/P percutaneous coronary angioplasty 2015, 2018    stents per Huong Sanabria    CHF (congestive heart failure) ([facility])     CKD (chronic kidney disease) stage 4, GFR 15-29 ml/min ([facility]) 2/24/2018    CKD stage 4 due to type 2 diabetes mellitus ([facility])     Contusion of right chest wall 2/16/2021    COPD (chronic obstructive pulmonary disease) ([facility])     Diabetic nephropathy with proteinuria ([facility]) 2014    DJD (degenerative joint disease) of knee     Dr Sharma    GERD (gastroesophageal reflux disease)     Hemiparesis, left ([facility]) 2013    entered Assisted Living ([facility])    Hemodialysis patient ([facility])     Hemodialysis-associated hypotension 10/22/2021    History of heart failure     History of seizures     History of type C viral hepatitis     HTN (hypertension)     Hyperlipidemia     Impaired mobility and activities of daily living     Infection of venous access  diabetes mellitus ([facility])    Chronic diastolic congestive heart failure ([facility])    AL[place]RIA (obstructive sleep apnea)    Pulmonary hypertension, unspecified ([facility])    Class 2 severe obesity with serious comorbidity and body mass index (BMI) of 36.0 to 36.9 in adult ([facility])    Edema    Closed supracondylar fracture of right humerus    Other chronic pain    Palliative care patient    Recurrent falls    Chronic renal failure    Difficulty in walking    ESRD (end stage renal disease) on dialysis ([facility])    Weakness    Other seizures ([facility])    Moderate persistent asthma without complication    COVID-19    Post PTCA    Falls frequently    Chest wall contusion, left, initial encounter    Headache, unspecified    Paranoid schizophrenia ([facility])    Compression fracture of spine ([facility])    Closed rib fracture    Depression    Chronic obstructive pulmonary disease ([facility])    Critical illness polyneuropathy ([facility])    Multiple closed fractures of ribs of right side    Nonrheumatic mitral valve regurgitation    Nonrheumatic tricuspid valve regurgitation    Need for extended care facility    Chronic pain of both shoulders    Gastrointestinal hemorrhage with melena    Hemodialysis-associated hypotension    Anginal chest pain at rest ([facility])    Chest pain    Unstable angina ([facility])    NSTEMI (non-ST elevated myocardial infarction) ([facility])    CKD (chronic kidney disease) stage 4, GFR 15-29 ml/min ([facility])    Hyperkalemia    Impaired mobility and activities of daily living dt polyneuropathy and reccent fall     Dialysis patient ([facility])    Unspecified open wound of right upper arm, initial encounter    Multiple closed fractures of right lower extremity and ribs    Closed T11 fracture ([facility])    Encephalopathy acute    MRSA bacteremia    Sepsis due to Enterococcus ([facility])    Local infection due to central venous catheter    DJD (degenerative joint disease), cervical    Closed head injury    Sarcopenia    Fall from standing    Sepsis due to skin infection ([facility])    Chronic  Never     Passive exposure: Never    Smokeless tobacco: Never   Vaping Use    Vaping Use: Never used   Substance and Sexual Activity    Alcohol use: No     Alcohol/week: 0.0 standard drinks of alcohol    Drug use: No    Sexual activity: Not Currently   Social History Narrative    Disabled dt depression and low back pain, lives in [place]    Dtr Angelina is close by and is her paid caregiver via waiver services    HD via [place] transit, Tues, Thur, Sat.    Son is in the home and has CP and is total care-and is also cared for by a waiver by Paulina.    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  Per daughter, her brother is blind, MRDD, CP multiple health issues.  Daughter's  is patient's legal guardian.    Patient has hemodialysis Tuesday Thursday and Saturday.  Patient's bedroom is on main floor with a half bath.  Daughter walks patient upstairs once weekly for full bath.  Patient is using her walker in the home.  Patient has a hospital bed in the home.     Social Determinants of Health     Financial Resource Strain: Low Risk  (7/29/2022)    Overall Financial Resource Strain (CARDIA)     Difficulty of Paying Living Expenses: Not hard at all   Food Insecurity: No Food Insecurity (7/27/2024)    Hunger Vital Sign     Worried About Running Out of Food in the Last Year: Never true     Large volume ascites.  Similar and unchanged when compared to the prior study.  No extraluminal air. Bones/Soft Tissues: Bony structures reveal multilevel degenerative changes seen within the spine.  There is anasarca seen throughout the soft tissues similar and unchanged when compared to the prior examination.     1. Large volume ascites similar and unchanged when compared to the prior examination. 2. Small bilateral pleural effusions. 3. Cardiomegaly with mild interstitial edema at the lung bases. 4. Cholelithiasis. 5. Anasarca seen throughout the soft tissues similar and unchanged when compared to the prior examination.     IR US GUIDED PARACENTESIS    1.  Status post technically successful ultrasound-guided paracentesis.  CLINICAL HISTORY:VELASQUEZ GARBER is a Female of 64 years age, referred for Ultrasound Guided Paracentesis.  PROCEDURE: Survey of the abdomen showed large amount of ascites fluid. After obtaining informed consent, the patient was positioned supine on the sonography table. Using ultrasound, the skin over the left hemiabdomen was locally anesthetized with 1% lidocaine.  Following that, a Yueh needle was advanced into the fluid pocket using ultrasound visualization. 7600 cc, of clear yellow fluid were aspirated and sent for cytology, and pathology.  The needle was removed, and hemostasis was obtained with pressure.  A Band-Aid was placed. Post procedure images did not demonstrate hemorrhage at the target site. The patient tolerated the procedure well. The patient left the department in good condition. A radiology nurse was in presence monitoring vital signs, assisting throughout the procedure. Electronically signed by Eze White      EKG: normal sinus rhythm, RBBB      2D Echo:     07/27/24    ECHO (TTE) COMPLETE (PRN CONTRAST/BUBBLE/STRAIN/3D) 08/01/2024  8:14 AM (Final)    Interpretation Summary    Left Ventricle: Normal left ventricular systolic function with a visually estimated EF of 55 -  respiratory failure  Hypovolemic shock.  History of CAD status post PCI to LAD, history of CABG with LIMA to the LAD as well as tricuspid valve repair.  Surgery complicated by tamponade status post pericardial window  End-stage renal disease hemodialysis dependent  Hypertension  Hyperlipidemia  History of atrial fibrillation-currently in normal sinus rhythm  EF of 55 to 60% by echo on 7/31/2024  Mild pulmonary hypertension RVSP of 42 mm pete  Mild mitral stenosis    PLAN:   As always, aggressive risk factor modification is strongly recommended. We should adhere to the JNC VIII guidelines for HTN management and the NCEPATP III guidelines for LDL-C management.  ICU supportive care and management  Wean vent off as tolerated.   Maximize cardiac medications  Wean pressors off as tolerated  No anticoagulation for now given acute blood loss anemia/hemodialysis access site bleed.  Resume when okay with team, hemoglobin is stable  Continue to monitor on telemetry  GI/DVT prophylaxis  Continue test between 4-5 magnesium greater than 2  Further recs to follow        Thank you for allowing me to participate in the care of your patient, please don't hesitate to contact me if you have any further questions.      Electronically signed by Virgilio Howell DO on 8/1/2024 at 10:43 AM

## 2024-08-01 NOTE — PROGRESS NOTES
Shift summary:  1900:Bedside report received from Linda HUDDLESTON, CRRT reviewed, lines and fistula assessed with handoff.  2000:Head to toe assessment, patient has cough and gag, pupils equal yet sluggish, OG 60cm, residual tested with PH strip result of 3, positive air bolus with syringe and auscultation. (See flow sheet head to toe)  2215 Called  regarding repeat ABG, ordered this RN to change RR on vent to 18.  0110:  on unit, this RN notified him that patient Respiratory rate despite max sedation, is anywhere from 24-28, Verbal order to increase fentanyl to 250mcg, and modify fentanyl gtt order for max of 300mcg.  0139: Call placed to dialysis hotline spoke with dialysis nurse regarding CRRT filter alarming for potential clotting of line, she notified this RN she is in the middle of a dialysis set up and will call back soon for an eta of arrival to hospital.  0204: Received call from [facility] dialysis nurse, notified this RN she is still in the middle of an emergent treatment at another hospital and will give eta soon.  0349: Repeat abg complete, Called  regarding abg, and updated him on increase of sedation for RR,  gave order to change tidal volume to 350.  0440:CRRT machine alarming more frequently for high pressure on the TMP and Filter, blood returned at this time, lines heparanized,  notified and given am lab values, No change in CRRT orders,  okay with restarting at 0700 when dialysis nurse arrives.  0500:Daily ABG complete, improvement in PH and CO2.  0700:CRRT restarted. Bedside handoff report given to Linda Cox RN.

## 2024-08-02 ENCOUNTER — APPOINTMENT (OUTPATIENT)
Age: 64
End: 2024-08-02
Payer: MEDICARE

## 2024-08-02 LAB
ACANTHOCYTES BLD QL SMEAR: ABNORMAL
ALBUMIN SERPL-MCNC: 3.2 G/DL (ref 3.5–4.6)
ALBUMIN SERPL-MCNC: 3.2 G/DL (ref 3.5–4.6)
ALP SERPL-CCNC: 108 U/L (ref 40–130)
ALT SERPL-CCNC: ABNORMAL U/L (ref 0–33)
ANION GAP SERPL CALCULATED.3IONS-SCNC: 12 MEQ/L (ref 9–15)
ANISOCYTOSIS BLD QL SMEAR: ABNORMAL
APTT PPP: 35.8 SEC (ref 24.4–36.8)
AST SERPL-CCNC: ABNORMAL U/L (ref 0–35)
BASE EXCESS ARTERIAL: -1 (ref -3–3)
BASOPHILS # BLD: 0 K/UL (ref 0–0.2)
BASOPHILS NFR BLD: 0.2 %
BILIRUB DIRECT SERPL-MCNC: ABNORMAL MG/DL (ref 0–0.4)
BILIRUB INDIRECT SERPL-MCNC: 0.9 MG/DL (ref 0–0.6)
BILIRUB SERPL-MCNC: 2.4 MG/DL (ref 0.2–0.7)
BUN SERPL-MCNC: 20 MG/DL (ref 8–23)
BURR CELLS: ABNORMAL
CA-I ADJ PH7.4 SERPL-SCNC: 1.14 MMOL/L (ref 1.09–1.3)
CA-I ADJ PH7.4 SERPL-SCNC: 1.32 MMOL/L (ref 1.09–1.3)
CA-I SERPL ISE-SCNC: 1.2 MMOL/L (ref 1.09–1.3)
CA-I SERPL ISE-SCNC: 1.22 MMOL/L (ref 1.09–1.3)
CALCIUM IONIZED: 1.26 MMOL/L (ref 1.12–1.32)
CALCIUM SERPL-MCNC: 9.1 MG/DL (ref 8.5–9.9)
CHLORIDE SERPL-SCNC: 99 MEQ/L (ref 95–107)
CO2 SERPL-SCNC: 21 MEQ/L (ref 20–31)
CREAT SERPL-MCNC: 3.27 MG/DL (ref 0.5–0.9)
ECHO BSA: 2.15 M2
ECHO LA DIAMETER INDEX: 2.14 CM/M2
ECHO LA DIAMETER: 4.5 CM
ECHO LA VOL A-L A2C: 83 ML (ref 22–52)
ECHO LA VOL A-L A4C: 91 ML (ref 22–52)
ECHO LA VOL MOD A2C: 78 ML (ref 22–52)
ECHO LA VOL MOD A4C: 86 ML (ref 22–52)
ECHO LA VOLUME AREA LENGTH: 88 ML
ECHO LA VOLUME INDEX A-L A2C: 40 ML/M2 (ref 16–34)
ECHO LA VOLUME INDEX A-L A4C: 43 ML/M2 (ref 16–34)
ECHO LA VOLUME INDEX AREA LENGTH: 42 ML/M2 (ref 16–34)
ECHO LA VOLUME INDEX MOD A2C: 37 ML/M2 (ref 16–34)
ECHO LA VOLUME INDEX MOD A4C: 41 ML/M2 (ref 16–34)
ECHO LV EDV A2C: 63 ML
ECHO LV EDV A4C: 86 ML
ECHO LV EDV BP: 74 ML (ref 56–104)
ECHO LV EDV INDEX A4C: 41 ML/M2
ECHO LV EDV INDEX BP: 35 ML/M2
ECHO LV EDV NDEX A2C: 30 ML/M2
ECHO LV EJECTION FRACTION A2C: 46 %
ECHO LV EJECTION FRACTION A4C: 51 %
ECHO LV EJECTION FRACTION BIPLANE: 49 % (ref 55–100)
ECHO LV ESV A2C: 34 ML
ECHO LV ESV A4C: 42 ML
ECHO LV ESV BP: 38 ML (ref 19–49)
ECHO LV ESV INDEX A2C: 16 ML/M2
ECHO LV ESV INDEX A4C: 20 ML/M2
ECHO LV ESV INDEX BP: 18 ML/M2
ECHO LV FRACTIONAL SHORTENING: 17 % (ref 28–44)
ECHO LV INTERNAL DIMENSION DIASTOLE INDEX: 1.38 CM/M2
ECHO LV INTERNAL DIMENSION DIASTOLIC: 2.9 CM (ref 3.9–5.3)
ECHO LV INTERNAL DIMENSION SYSTOLIC INDEX: 1.14 CM/M2
ECHO LV INTERNAL DIMENSION SYSTOLIC: 2.4 CM
ECHO LV IVSD: 1.5 CM (ref 0.6–0.9)
ECHO LV IVSS: 1.7 CM
ECHO LV MASS 2D: 126.4 G (ref 67–162)
ECHO LV MASS INDEX 2D: 60.2 G/M2 (ref 43–95)
ECHO LV POSTERIOR WALL DIASTOLIC: 1.2 CM (ref 0.6–0.9)
ECHO LV POSTERIOR WALL SYSTOLIC: 1.2 CM
ECHO LV RELATIVE WALL THICKNESS RATIO: 0.83
ECHO MV A VELOCITY: 1.03 M/S
ECHO MV E DECELERATION TIME (DT): 280.8 MS
ECHO MV E VELOCITY: 1.45 M/S
ECHO MV E/A RATIO: 1.41
ECHO RV INTERNAL DIMENSION: 3.1 CM
ECHO RV TAPSE: 1 CM (ref 1.7–?)
EOSINOPHIL # BLD: 1.2 K/UL (ref 0–0.7)
EOSINOPHIL NFR BLD: 7 %
ERYTHROCYTE [DISTWIDTH] IN BLOOD BY AUTOMATED COUNT: 20 % (ref 11.5–14.5)
FIBRINOGEN PPP-MCNC: 296 MG/DL (ref 262–562)
GLUCOSE BLD-MCNC: 112 MG/DL (ref 70–99)
GLUCOSE BLD-MCNC: 120 MG/DL (ref 70–99)
GLUCOSE BLD-MCNC: 129 MG/DL (ref 70–99)
GLUCOSE BLD-MCNC: 154 MG/DL (ref 70–99)
GLUCOSE SERPL-MCNC: 110 MG/DL (ref 70–99)
HCO3 ARTERIAL: 23 MMOL/L (ref 21–29)
HCT VFR BLD AUTO: 28 % (ref 36–48)
HCT VFR BLD AUTO: 28.2 % (ref 37–47)
HGB BLD CALC-MCNC: 9.4 GM/DL (ref 12–16)
HGB BLD-MCNC: 9.7 G/DL (ref 12–16)
INR PPP: 1.4
INR PPP: 1.6
LACTATE: 1.27 MMOL/L (ref 0.4–2)
LYMPHOCYTES # BLD: 0.5 K/UL (ref 1–4.8)
LYMPHOCYTES NFR BLD: 3 %
MCH RBC QN AUTO: 29.8 PG (ref 27–31.3)
MCHC RBC AUTO-ENTMCNC: 34.4 % (ref 33–37)
MCV RBC AUTO: 86.8 FL (ref 79.4–94.8)
MONOCYTES # BLD: 0.2 K/UL (ref 0.2–0.8)
MONOCYTES NFR BLD: 1 %
NEUTROPHILS # BLD: 15.1 K/UL (ref 1.4–6.5)
NEUTS SEG NFR BLD: 89 %
O2 SAT, ARTERIAL: 96 % (ref 93–100)
PCO2 ARTERIAL: 35 MM HG (ref 35–45)
PERFORMED ON: ABNORMAL
PH ARTERIAL: 7.43 (ref 7.35–7.45)
PHOSPHATE SERPL-MCNC: 2.8 MG/DL (ref 2.3–4.8)
PLATELET # BLD AUTO: 97 K/UL (ref 130–400)
PLATELET BLD QL SMEAR: ABNORMAL
PO2 ARTERIAL: 76 MM HG (ref 75–108)
POC CHLORIDE: 99 MEQ/L (ref 99–110)
POC CREATININE: 3.2 MG/DL (ref 0.6–1.2)
POC FIO2: 30
POC SAMPLE TYPE: ABNORMAL
POIKILOCYTOSIS BLD QL SMEAR: ABNORMAL
POLYCHROMASIA BLD QL SMEAR: ABNORMAL
POTASSIUM SERPL-SCNC: 3.6 MEQ/L (ref 3.4–4.9)
POTASSIUM SERPL-SCNC: 3.8 MEQ/L (ref 3.5–5.1)
PROCALCITONIN SERPL IA-MCNC: 0.98 NG/ML (ref 0–0.15)
PROT SERPL-MCNC: 5.3 G/DL (ref 6.3–8)
PROTHROMBIN TIME: 17.3 SEC (ref 12.3–14.9)
PROTHROMBIN TIME: 19.6 SEC (ref 12.3–14.9)
RBC # BLD AUTO: 3.25 M/UL (ref 4.2–5.4)
SODIUM BLD-SCNC: 132 MEQ/L (ref 136–145)
SODIUM SERPL-SCNC: 132 MEQ/L (ref 135–144)
TCO2 ARTERIAL: 24 MMOL/L (ref 21–32)
WBC # BLD AUTO: 17 K/UL (ref 4.8–10.8)

## 2024-08-02 PROCEDURE — 85730 THROMBOPLASTIN TIME PARTIAL: CPT

## 2024-08-02 PROCEDURE — 2700000000 HC OXYGEN THERAPY PER DAY

## 2024-08-02 PROCEDURE — 83010 ASSAY OF HAPTOGLOBIN QUANT: CPT

## 2024-08-02 PROCEDURE — 85014 HEMATOCRIT: CPT

## 2024-08-02 PROCEDURE — 6370000000 HC RX 637 (ALT 250 FOR IP): Performed by: COLON & RECTAL SURGERY

## 2024-08-02 PROCEDURE — 2580000003 HC RX 258: Performed by: INTERNAL MEDICINE

## 2024-08-02 PROCEDURE — 2500000003 HC RX 250 WO HCPCS: Performed by: INTERNAL MEDICINE

## 2024-08-02 PROCEDURE — 2580000003 HC RX 258: Performed by: NURSE PRACTITIONER

## 2024-08-02 PROCEDURE — 2580000003 HC RX 258: Performed by: COLON & RECTAL SURGERY

## 2024-08-02 PROCEDURE — 82948 REAGENT STRIP/BLOOD GLUCOSE: CPT

## 2024-08-02 PROCEDURE — 6370000000 HC RX 637 (ALT 250 FOR IP): Performed by: INTERNAL MEDICINE

## 2024-08-02 PROCEDURE — 2500000003 HC RX 250 WO HCPCS: Performed by: COLON & RECTAL SURGERY

## 2024-08-02 PROCEDURE — 37799 UNLISTED PX VASCULAR SURGERY: CPT

## 2024-08-02 PROCEDURE — 6360000002 HC RX W HCPCS: Performed by: NURSE PRACTITIONER

## 2024-08-02 PROCEDURE — 84295 ASSAY OF SERUM SODIUM: CPT

## 2024-08-02 PROCEDURE — 6360000002 HC RX W HCPCS: Performed by: STUDENT IN AN ORGANIZED HEALTH CARE EDUCATION/TRAINING PROGRAM

## 2024-08-02 PROCEDURE — 84145 PROCALCITONIN (PCT): CPT

## 2024-08-02 PROCEDURE — 86038 ANTINUCLEAR ANTIBODIES: CPT

## 2024-08-02 PROCEDURE — 82330 ASSAY OF CALCIUM: CPT

## 2024-08-02 PROCEDURE — 86036 ANCA SCREEN EACH ANTIBODY: CPT

## 2024-08-02 PROCEDURE — 82435 ASSAY OF BLOOD CHLORIDE: CPT

## 2024-08-02 PROCEDURE — 80076 HEPATIC FUNCTION PANEL: CPT

## 2024-08-02 PROCEDURE — 85384 FIBRINOGEN ACTIVITY: CPT

## 2024-08-02 PROCEDURE — 6360000002 HC RX W HCPCS: Performed by: INTERNAL MEDICINE

## 2024-08-02 PROCEDURE — 93321 DOPPLER ECHO F-UP/LMTD STD: CPT | Performed by: INTERNAL MEDICINE

## 2024-08-02 PROCEDURE — 2000000000 HC ICU R&B

## 2024-08-02 PROCEDURE — 82565 ASSAY OF CREATININE: CPT

## 2024-08-02 PROCEDURE — 86039 ANTINUCLEAR ANTIBODIES (ANA): CPT

## 2024-08-02 PROCEDURE — 2500000003 HC RX 250 WO HCPCS: Performed by: NURSE PRACTITIONER

## 2024-08-02 PROCEDURE — 83516 IMMUNOASSAY NONANTIBODY: CPT

## 2024-08-02 PROCEDURE — 82803 BLOOD GASES ANY COMBINATION: CPT

## 2024-08-02 PROCEDURE — 93308 TTE F-UP OR LMTD: CPT

## 2024-08-02 PROCEDURE — 93325 DOPPLER ECHO COLOR FLOW MAPG: CPT | Performed by: INTERNAL MEDICINE

## 2024-08-02 PROCEDURE — 85610 PROTHROMBIN TIME: CPT

## 2024-08-02 PROCEDURE — 84132 ASSAY OF SERUM POTASSIUM: CPT

## 2024-08-02 PROCEDURE — 94003 VENT MGMT INPAT SUBQ DAY: CPT

## 2024-08-02 PROCEDURE — 99233 SBSQ HOSP IP/OBS HIGH 50: CPT

## 2024-08-02 PROCEDURE — 2580000003 HC RX 258: Performed by: STUDENT IN AN ORGANIZED HEALTH CARE EDUCATION/TRAINING PROGRAM

## 2024-08-02 PROCEDURE — 93308 TTE F-UP OR LMTD: CPT | Performed by: INTERNAL MEDICINE

## 2024-08-02 PROCEDURE — 36415 COLL VENOUS BLD VENIPUNCTURE: CPT

## 2024-08-02 PROCEDURE — 99233 SBSQ HOSP IP/OBS HIGH 50: CPT | Performed by: INTERNAL MEDICINE

## 2024-08-02 PROCEDURE — 6370000000 HC RX 637 (ALT 250 FOR IP): Performed by: NURSE PRACTITIONER

## 2024-08-02 PROCEDURE — 85025 COMPLETE CBC W/AUTO DIFF WBC: CPT

## 2024-08-02 PROCEDURE — 99291 CRITICAL CARE FIRST HOUR: CPT | Performed by: INTERNAL MEDICINE

## 2024-08-02 PROCEDURE — 80069 RENAL FUNCTION PANEL: CPT

## 2024-08-02 PROCEDURE — 83605 ASSAY OF LACTIC ACID: CPT

## 2024-08-02 PROCEDURE — 6360000002 HC RX W HCPCS: Performed by: COLON & RECTAL SURGERY

## 2024-08-02 PROCEDURE — 2580000003 HC RX 258: Performed by: ANESTHESIOLOGY

## 2024-08-02 RX ORDER — NOREPINEPHRINE BITARTRATE 0.06 MG/ML
1-100 INJECTION, SOLUTION INTRAVENOUS CONTINUOUS
Status: DISCONTINUED | OUTPATIENT
Start: 2024-08-02 | End: 2024-08-08 | Stop reason: HOSPADM

## 2024-08-02 RX ORDER — LACTULOSE 10 G/15ML
20 SOLUTION ORAL 3 TIMES DAILY
Status: DISCONTINUED | OUTPATIENT
Start: 2024-08-02 | End: 2024-08-08 | Stop reason: HOSPADM

## 2024-08-02 RX ORDER — POLYETHYLENE GLYCOL 3350 17 G/17G
17 POWDER, FOR SOLUTION ORAL DAILY
Status: DISCONTINUED | OUTPATIENT
Start: 2024-08-02 | End: 2024-08-08 | Stop reason: HOSPADM

## 2024-08-02 RX ORDER — SODIUM FERRIC GLUCONATE COMPLEX IN SUCROSE 12.5 MG/ML
125 INJECTION INTRAVENOUS DAILY
Status: DISCONTINUED | OUTPATIENT
Start: 2024-08-02 | End: 2024-08-02

## 2024-08-02 RX ORDER — METOCLOPRAMIDE HYDROCHLORIDE 5 MG/ML
10 INJECTION INTRAMUSCULAR; INTRAVENOUS EVERY 6 HOURS
Status: COMPLETED | OUTPATIENT
Start: 2024-08-02 | End: 2024-08-02

## 2024-08-02 RX ADMIN — MICONAZOLE NITRATE: 2 POWDER TOPICAL at 10:48

## 2024-08-02 RX ADMIN — HYDROCORTISONE SODIUM SUCCINATE 100 MG: 100 INJECTION, POWDER, FOR SOLUTION INTRAMUSCULAR; INTRAVENOUS at 17:35

## 2024-08-02 RX ADMIN — Medication 250 MCG/HR: at 22:45

## 2024-08-02 RX ADMIN — PROPOFOL 50 MCG/KG/MIN: 10 INJECTION, EMULSION INTRAVENOUS at 16:27

## 2024-08-02 RX ADMIN — Medication 300 MCG/HR: at 18:46

## 2024-08-02 RX ADMIN — SODIUM CHLORIDE 125 MG: 9 INJECTION, SOLUTION INTRAVENOUS at 19:30

## 2024-08-02 RX ADMIN — ARIPIPRAZOLE 5 MG: 2 TABLET ORAL at 10:45

## 2024-08-02 RX ADMIN — CHLORHEXIDINE GLUCONATE 15 ML: 1.2 RINSE ORAL at 20:18

## 2024-08-02 RX ADMIN — Medication 300 MCG/HR: at 15:11

## 2024-08-02 RX ADMIN — METOCLOPRAMIDE HYDROCHLORIDE 10 MG: 5 INJECTION INTRAMUSCULAR; INTRAVENOUS at 09:58

## 2024-08-02 RX ADMIN — VASOPRESSIN 0.03 UNITS/MIN: 20 INJECTION INTRAVENOUS at 07:57

## 2024-08-02 RX ADMIN — LACTULOSE 20 G: 20 SOLUTION ORAL at 20:18

## 2024-08-02 RX ADMIN — Medication 23 MCG/MIN: at 01:12

## 2024-08-02 RX ADMIN — Medication: at 04:29

## 2024-08-02 RX ADMIN — PROPOFOL 50 MCG/KG/MIN: 10 INJECTION, EMULSION INTRAVENOUS at 00:08

## 2024-08-02 RX ADMIN — Medication 300 MCG/HR: at 11:48

## 2024-08-02 RX ADMIN — VANCOMYCIN HYDROCHLORIDE 750 MG: 750 INJECTION, POWDER, LYOPHILIZED, FOR SOLUTION INTRAVENOUS at 22:27

## 2024-08-02 RX ADMIN — LACTULOSE 20 G: 20 SOLUTION ORAL at 15:55

## 2024-08-02 RX ADMIN — Medication 300 MCG/HR: at 08:35

## 2024-08-02 RX ADMIN — FLUDROCORTISONE ACETATE 0.1 MG: 0.1 TABLET ORAL at 20:18

## 2024-08-02 RX ADMIN — VANCOMYCIN HYDROCHLORIDE 2000 MG: 1 INJECTION, POWDER, LYOPHILIZED, FOR SOLUTION INTRAVENOUS at 11:54

## 2024-08-02 RX ADMIN — PIPERACILLIN AND TAZOBACTAM 3375 MG: 3; .375 INJECTION, POWDER, LYOPHILIZED, FOR SOLUTION INTRAVENOUS at 16:34

## 2024-08-02 RX ADMIN — LACTULOSE 20 G: 20 SOLUTION ORAL at 10:49

## 2024-08-02 RX ADMIN — Medication: at 04:33

## 2024-08-02 RX ADMIN — SERTRALINE HYDROCHLORIDE 50 MG: 50 TABLET ORAL at 10:45

## 2024-08-02 RX ADMIN — PROPOFOL 50 MCG/KG/MIN: 10 INJECTION, EMULSION INTRAVENOUS at 09:47

## 2024-08-02 RX ADMIN — MICONAZOLE NITRATE: 20 CREAM TOPICAL at 10:47

## 2024-08-02 RX ADMIN — PROPOFOL 50 MCG/KG/MIN: 10 INJECTION, EMULSION INTRAVENOUS at 19:32

## 2024-08-02 RX ADMIN — MIDODRINE HYDROCHLORIDE 20 MG: 10 TABLET ORAL at 10:44

## 2024-08-02 RX ADMIN — METOCLOPRAMIDE HYDROCHLORIDE 10 MG: 5 INJECTION INTRAMUSCULAR; INTRAVENOUS at 15:55

## 2024-08-02 RX ADMIN — METOCLOPRAMIDE HYDROCHLORIDE 10 MG: 5 INJECTION INTRAMUSCULAR; INTRAVENOUS at 20:18

## 2024-08-02 RX ADMIN — Medication: at 09:50

## 2024-08-02 RX ADMIN — POLYETHYLENE GLYCOL 3350 17 G: 17 POWDER, FOR SOLUTION ORAL at 10:45

## 2024-08-02 RX ADMIN — Medication 35 MCG/MIN: at 09:45

## 2024-08-02 RX ADMIN — PROPOFOL 50 MCG/KG/MIN: 10 INJECTION, EMULSION INTRAVENOUS at 13:16

## 2024-08-02 RX ADMIN — EPOETIN ALFA-EPBX 8000 UNITS: 4000 INJECTION, SOLUTION INTRAVENOUS; SUBCUTANEOUS at 15:59

## 2024-08-02 RX ADMIN — Medication 300 MCG/HR: at 05:27

## 2024-08-02 RX ADMIN — PROPOFOL 45 MCG/KG/MIN: 10 INJECTION, EMULSION INTRAVENOUS at 23:54

## 2024-08-02 RX ADMIN — Medication 2000 UNITS: at 10:44

## 2024-08-02 RX ADMIN — DOCUSATE SODIUM 100 MG: 50 LIQUID ORAL at 20:18

## 2024-08-02 RX ADMIN — DOCUSATE SODIUM 100 MG: 50 LIQUID ORAL at 10:45

## 2024-08-02 RX ADMIN — MICONAZOLE NITRATE: 20 CREAM TOPICAL at 20:19

## 2024-08-02 RX ADMIN — VASOPRESSIN 0.03 UNITS/MIN: 20 INJECTION INTRAVENOUS at 16:23

## 2024-08-02 RX ADMIN — PROPOFOL 50 MCG/KG/MIN: 10 INJECTION, EMULSION INTRAVENOUS at 03:06

## 2024-08-02 RX ADMIN — CHLORHEXIDINE GLUCONATE 15 ML: 1.2 RINSE ORAL at 10:45

## 2024-08-02 RX ADMIN — Medication: at 09:52

## 2024-08-02 RX ADMIN — HEPARIN SODIUM 1200 UNITS: 1000 INJECTION INTRAVENOUS; SUBCUTANEOUS at 11:09

## 2024-08-02 RX ADMIN — Medication 300 MCG/HR: at 01:46

## 2024-08-02 RX ADMIN — Medication 10 ML: at 10:17

## 2024-08-02 RX ADMIN — HEPARIN SODIUM 1300 UNITS: 1000 INJECTION INTRAVENOUS; SUBCUTANEOUS at 11:10

## 2024-08-02 RX ADMIN — HYDROCORTISONE SODIUM SUCCINATE 100 MG: 100 INJECTION, POWDER, FOR SOLUTION INTRAMUSCULAR; INTRAVENOUS at 09:57

## 2024-08-02 RX ADMIN — NEPHROCAP 1 MG: 1 CAP ORAL at 10:44

## 2024-08-02 RX ADMIN — PROPOFOL 50 MCG/KG/MIN: 10 INJECTION, EMULSION INTRAVENOUS at 06:46

## 2024-08-02 RX ADMIN — Medication 10 ML: at 20:24

## 2024-08-02 RX ADMIN — FLUDROCORTISONE ACETATE 0.1 MG: 0.1 TABLET ORAL at 10:45

## 2024-08-02 RX ADMIN — Medication 32 MCG/MIN: at 17:53

## 2024-08-02 RX ADMIN — Medication 10 ML: at 20:19

## 2024-08-02 RX ADMIN — SODIUM CHLORIDE, PRESERVATIVE FREE 40 MG: 5 INJECTION INTRAVENOUS at 09:59

## 2024-08-02 RX ADMIN — INSULIN GLARGINE 35 UNITS: 100 INJECTION, SOLUTION SUBCUTANEOUS at 20:30

## 2024-08-02 RX ADMIN — MIDODRINE HYDROCHLORIDE 20 MG: 10 TABLET ORAL at 15:55

## 2024-08-02 RX ADMIN — MICONAZOLE NITRATE: 2 POWDER TOPICAL at 20:23

## 2024-08-02 RX ADMIN — PIPERACILLIN AND TAZOBACTAM 3375 MG: 3; .375 INJECTION, POWDER, LYOPHILIZED, FOR SOLUTION INTRAVENOUS at 10:16

## 2024-08-02 ASSESSMENT — PULMONARY FUNCTION TESTS
PIF_VALUE: 31
PIF_VALUE: 30
PIF_VALUE: 32
PIF_VALUE: 29
PIF_VALUE: 30
PIF_VALUE: 30
PIF_VALUE: 31
PIF_VALUE: 29
PIF_VALUE: 31
PIF_VALUE: 29
PIF_VALUE: 31
PIF_VALUE: 33
PIF_VALUE: 28
PIF_VALUE: 29
PIF_VALUE: 30
PIF_VALUE: 30
PIF_VALUE: 33
PIF_VALUE: 30
PIF_VALUE: 35
PIF_VALUE: 31
PIF_VALUE: 32
PIF_VALUE: 30
PIF_VALUE: 31
PIF_VALUE: 30
PIF_VALUE: 30
PIF_VALUE: 29
PIF_VALUE: 32
PIF_VALUE: 31
PIF_VALUE: 30
PIF_VALUE: 30
PIF_VALUE: 28
PIF_VALUE: 31
PIF_VALUE: 30
PIF_VALUE: 35
PIF_VALUE: 28
PIF_VALUE: 31
PIF_VALUE: 30
PIF_VALUE: 29
PIF_VALUE: 31
PIF_VALUE: 30
PIF_VALUE: 28
PIF_VALUE: 29
PIF_VALUE: 31
PIF_VALUE: 31
PIF_VALUE: 30

## 2024-08-02 ASSESSMENT — PAIN SCALES - WONG BAKER
WONGBAKER_NUMERICALRESPONSE: NO HURT

## 2024-08-02 ASSESSMENT — PAIN SCALES - GENERAL
PAINLEVEL_OUTOF10: 0
PAINLEVEL_OUTOF10: 0

## 2024-08-02 NOTE — PROGRESS NOTES
[facility]   Pharmacy Pharmacokinetic Monitoring Service - Vancomycin    Michelle Le is a 64 y.o. female starting on vancomycin therapy for sepsis. Pharmacy consulted by Milagros Hanna NP for monitoring and adjustment.    Target Concentration:  CRRT dosing trough < 15  Additional Antimicrobials: zosyn    Pertinent Laboratory Values:   Wt Readings from Last 1 Encounters:   08/01/24 96.8 kg (213 lb 8 oz)     Temp Readings from Last 1 Encounters:   08/02/24 (!) 94.8 °F (34.9 °C) (Rectal)     Recent Labs     08/01/24  0352 08/01/24  0503 08/01/24  2145 08/02/24  0444 08/02/24  0458 08/02/24  0459   CREATININE  --    < > 4.17* 3.2*  --  3.27*   BUN  --    < > 26*  --   --  20   WBC 13.1*  --   --   --  17.0*  --     < > = values in this interval not displayed.         Pertinent Cultures:  Culture Date Source Results   08/02 blood pending   MRSA Nasal Swab: N/A. Non-respiratory infection.    Plan:  Concentration-guided dosing due to renal impairment and CRRT  Start vancomycin load with 2000 mg x 1 then 750 mg q 12 hours per CRRT protocol  Vancomycin concentration ordered for 08/03 @ 1000, post first maintenance dose per CRRT protocol *Note only 1 maintenance dose entered in EPIC to ensure trough < 15 prior to next dose  Pharmacy will continue to monitor patient and adjust therapy as indicated    Thank you for the consult,  Bibiana Barron RPH  8/2/2024 9:22 AM

## 2024-08-02 NOTE — PROGRESS NOTES
Spiritual Health Assessment/Progress Note  [facility]    (P) Rituals, Rites and Sacraments, Code Blue,  ,      Name: Michelle Le MRN: [id number]    Age: 64 y.o.     Sex: female   Language: English   Temple: Scientologist   Blood loss anemia     Date: 8/2/2024            Total Time Calculated: (P) 9 min              Spiritual Assessment continued in [facility] ICU        Referral/Consult From: Rounding   Encounter Overview/Reason: (P) Rituals, Rites and Sacraments  Service Provided For: (P) Patient    Pat, Belief, Meaning:   Patient Other: daughter says Pt is connected to Scientologist community  Family/Friends No family/friends present      Importance and Influence:  Patient unable to communicate at this time  Family/Friends no family/friends present    Community:  Patient is connected with a spiritual community and feels well-supported. Support system includes: Children and Pat Community  Family/Friends feel well-supported. Support system includes: Children    Assessment and Plan of Care:     Patient Interventions include: Provided sacramental/Roman Catholic ritual  Family/Friends Interventions include: Other: NA    Patient Plan of Care: Spiritual Care available upon further referral  Family/Friends Plan of Care: Spiritual Care available upon further referral    Electronically signed by Chaplain David on 8/2/2024 at 1:53 PM

## 2024-08-02 NOTE — CONSULTS
Hematology/Oncology Consult  Encounter Date: 2024 5:36 PM    Ms. Michelle Le is a 64 y.o. female  : 1960  MRN: [id number]  Acct Number: [id number]  Requesting Provider: Werner Ortiz MD    Reason for request: elevated eosinophils.      CONSULTANT: Bc Shipley MD    HPI: Michelle elizabeth admitted for anemia. Hemoglobin at 6 after she had bleeding when bandage was taken off. She receive blood transfusion on 2024 and again on 7/3/1/2024.  Eosinophil level has been at 4-7. This is non specific and can be seen in renal failure or chronic renal disease, as well as in allergic, infectious or inflammatory conditions.     Patient Active Problem List   Diagnosis    Coronary artery disease involving native coronary artery of native heart with angina pectoris (HCC)    Schizophrenia, paranoid, chronic    Metabolic syndrome    Vitamin B 12 deficiency    Cerebral microvascular disease    Mixed hyperlipidemia    Other hammer toe (acquired)    Vitamin D insufficiency    Incontinence of urine    Diabetic nephropathy with proteinuria (HCC)    Essential (primary) hypertension    History of type C viral hepatitis    Urinary incontinence due to cognitive impairment    History of seizures    Stented coronary artery-plan is to stay on Plavix indefinately per Dr Walker    Diabetes mellitus ([facility])    Controlled type 2 diabetes mellitus with diabetic neuropathy, with long-term current use of insulin ([facility])    Hemiparesis, left ([facility])    Angina, class II ([facility])    Pain, unspecified    Tardive dyskinesia    Shortness of breath    Poorly controlled diabetes mellitus ([facility])    Chronic diastolic congestive heart failure (HCC)    [place] (obstructive sleep apnea)    Pulmonary hypertension, unspecified ([facility])    Class 2 severe obesity with serious comorbidity and body mass index (BMI) of 36.0 to 36.9 in adult (HCC)    Edema    Closed supracondylar fracture of right humerus    Other chronic pain    Palliative care patient    Recurrent  Cellulitis of left hand    Chronic osteomyelitis of knee (HCC)    Generalized weakness    Shock (HCC)    Fluid overload    Encounter for hospice care discussion    Advanced care planning/counseling discussion    Goals of care, counseling/discussion    Palliative care encounter    Nonhealing nonsurgical wound    Skin picking habit    Hypotension    ESRD on dialysis (HCC)    Acute decompensated heart failure (HCC)    Constipation    Chronic antibiotic suppression    Chronic infection of prosthetic knee (HCC)    Lack of intravenous access    Pressure injury, unstageable, with suspected deep tissue injury (HCC)    Hypogammaglobulinemia (HCC)    Adrenal insufficiency (HCC)    Open wound of left hand without foreign body    Other ascites    Cirrhosis of liver with ascites (HCC)    Thrombocytopenia (HCC)    Blood loss anemia    Hemorrhage of arteriovenous fistula (HCC)     Past Medical History:   Diagnosis Date    Angina at rest (HCC) 5/24/2020    Anxiety     CAD S/P percutaneous coronary angioplasty 2015, 2018    stents per Huong Sanabria    CHF (congestive heart failure) (HCC)     CKD (chronic kidney disease) stage 4, GFR 15-29 ml/min (HCC) 2/24/2018    CKD stage 4 due to type 2 diabetes mellitus (HCC)     Contusion of right chest wall 2/16/2021    COPD (chronic obstructive pulmonary disease) (HCC)     Diabetic nephropathy with proteinuria (HCC) 2014    DJD (degenerative joint disease) of knee     Dr Sharma    GERD (gastroesophageal reflux disease)     Hemiparesis, left (HCC) 2013    entered Assisted Living ([place])    Hemodialysis patient (HCC)     Hemodialysis-associated hypotension 10/22/2021    History of heart failure     History of seizures     History of type C viral hepatitis     HTN (hypertension)     Hyperlipidemia     Impaired mobility and activities of daily living     Infection of venous access port 7/29/2022    Mediastinal lymphadenopathy 2013    Sangeeta Cummings    Metabolic syndrome   stroke\" or \"stroke alert\" been called?->No What reading provider will be dictating this exam?->CRC FINDINGS: BRAIN/VENTRICLES: There is no acute intracranial hemorrhage, mass effect or midline shift.  No abnormal extra-axial fluid collection.  The gray-white differentiation is maintained without evidence of an acute infarct.  There is no evidence of hydrocephalus. ORBITS: The visualized portion of the orbits demonstrate no acute abnormality. SINUSES: The visualized paranasal sinuses and mastoid air cells demonstrate no acute abnormality. SOFT TISSUES/SKULL:  No acute abnormality of the visualized skull or soft tissues.     No acute intracranial abnormality.     XR CHEST (SINGLE VIEW FRONTAL)    Result Date: 7/30/2024  EXAMINATION: ONE XRAY VIEW OF THE CHEST 7/30/2024 9:11 am COMPARISON: April 2024 HISTORY: ORDERING SYSTEM PROVIDED HISTORY: Kenyon catheter placement TECHNOLOGIST PROVIDED HISTORY: Reason for exam:->Kenyon catheter placement What reading provider will be dictating this exam?->CRC FINDINGS: And left neck central venous catheter is seen, tip in the expected location of the brachiocephalic/SVC junction.  The cardiac silhouette is enlarged. Mildly increased central pulmonary venous markings are seen.  There may be a small right pleural effusion     1. Left neck central venous catheter, tip in the expected location of the brachiocephalic/SVC junction. 2. Cardiomegaly with mild pulmonary venous congestion. 3. Possible small right pleural effusion.     FLUORO FOR SURGICAL PROCEDURES    Result Date: 7/30/2024  EXAMINATION: SPOT FLUOROSCOPIC IMAGES 7/30/2024 6:39 am TECHNIQUE: Fluoroscopy was provided by the radiology department for procedure. Radiologist was not present during examination. FLUOROSCOPY DOSE AND TYPE: Radiation Exposure Index: Kerma mGy, 38.6 COMPARISON: None HISTORY: ORDERING SYSTEM PROVIDED HISTORY: pain TECHNOLOGIST PROVIDED HISTORY: Reason for exam:->pain What reading provider will be

## 2024-08-02 NOTE — PROGRESS NOTES
Shift Summary [id number]    0700 Handoff of care report received from Noé HUDDLESTON. Pt continues with ventilation and sedation is adequate at this time.  Any adjustments to vent settings per RT.      0800 CRRT continues without issue.  Pt continues to need an increase in pressure support.  Vasopressin added.  Dr. Espinoza rounded and CRRT orders changed.  Pt to remain at a removal rate of 160/hour.      0900 Unit rounds completed.  Daughter, Paulina, at bedside and updated.  Discussed code status and would like patient to stay Full Code at this time.     1055 - CRRT stopped at this time due to increase in pressor requirements.  Blood returned to patient and port heparinized.  Family at bedside and updated.    1400 - Spoke with Milagros regarding level of sedation.  Will begin to wean off Midazolam.  Pt remains on pressors.     1515 Residual checked - 235 cc green bile pulled out and returned.  Tube feed restarted per protocol.  Pt is on Reglan.    1838 Pt continues to tolerated sedation.  Have been unable to wean levophed at this time.  Family updated.

## 2024-08-02 NOTE — CARE COORDINATION
This AFUAW completed rounds with the ICU team this am.  Patient remains intubated and sedated.  Patients daughter was at bedside.  Patients daughter is aware of the situation, and is calling her grandmother(patient's mother) in [place] to see if she wants to come up from [place] to see patient before she makes any decisions on her mothers care.    HIPOLITO/LIZBETH to follow.  Electronically signed by HIPOLITO Mortensen on 8/2/24 at 12:17 PM EDT

## 2024-08-02 NOTE — PROGRESS NOTES
Hospitalist Progress Note      PCP: Adilene Terry MD    Date of Admission: 7/27/2024    Chief Complaint:  remains intubated, sedated with Propofol and Fentanyl, hypotensive requiring Norepinephrine and Vasopressin support    Medications:  Reviewed    Infusion Medications    VASOpressin 20 Units in sodium chloride 0.9 % 100 mL infusion 0.03 Units/min (08/02/24 1249)    norepinephrine 33 mcg/min (08/02/24 1249)    prismaSATE BGK 4/2.5 200 mL/hr (08/01/24 2312)    midazolam 3 mg/hr (08/02/24 1249)    sodium chloride      sodium chloride      fentaNYL 300 mcg/hr (08/02/24 1249)    propofol 50 mcg/kg/min (08/02/24 1316)    prismaSATE BGK 4/2.5 1,000 mL/hr at 08/02/24 0952    prismaSATE BGK 4/2.5 1,000 mL/hr at 08/02/24 0950    sodium chloride      sodium chloride      sodium chloride      dextrose       Scheduled Medications    hydrocortisone sodium succinate PF  100 mg IntraVENous Q8H    miconazole   Topical BID    chlorhexidine  15 mL Mouth/Throat BID    insulin lispro  0-16 Units SubCUTAneous Q4H    pantoprazole (PROTONIX) 40 mg in sodium chloride (PF) 0.9 % 10 mL injection  40 mg IntraVENous Daily    sodium chloride flush  5-40 mL IntraVENous 2 times per day    sodium chloride flush  5-40 mL IntraVENous 2 times per day    amoxicillin  500 mg Oral 2 times per day    Virt-Caps  1 capsule Oral Daily    Vitamin D  2,000 Units Oral Daily    ARIPiprazole  5 mg Oral Daily    fludrocortisone  0.1 mg Oral BID    sertraline  50 mg Oral Daily    miconazole   Topical BID    epoetin chadwick-epbx  8,000 Units SubCUTAneous Once per day on Mon Wed Fri    sodium chloride flush  5-40 mL IntraVENous 2 times per day    insulin glargine  35 Units SubCUTAneous Nightly    midodrine  20 mg Oral TID WC    [Held by provider] budesonide-formoterol  2 puff Inhalation BID RT    And    [Held by provider] tiotropium  2 puff Inhalation Daily RT    pill splitter   Does not apply Once     PRN Meds: heparin (porcine) **AND** heparin (porcine), sodium  chloride, perflutren lipid microspheres, sodium chloride, fentaNYL **AND** fentaNYL, ipratropium 0.5 mg-albuterol 2.5 mg, heparin (porcine), heparin (porcine) **AND** heparin (porcine), naloxone 0.4 mg in 10 mL sodium chloride syringe, sodium chloride flush, sodium chloride, fentanNYL, HYDROmorphone, sodium chloride flush, sodium chloride, albuterol, melatonin, sodium chloride flush, sodium chloride, ondansetron **OR** ondansetron, polyethylene glycol, acetaminophen **OR** acetaminophen, glucose, dextrose bolus **OR** dextrose bolus, glucagon (rDNA), dextrose      Intake/Output Summary (Last 24 hours) at 8/2/2024 0821  Last data filed at 8/2/2024 0800  Gross per 24 hour   Intake 2226.37 ml   Output 1742 ml   Net 484.37 ml         Exam:    BP (!) 123/54   Pulse 97   Temp (!) 94.8 °F (34.9 °C) (Rectal)   Resp (!) 32   Ht 1.727 m (5' 8\")   Wt 96.8 kg (213 lb 8 oz)   LMP  (LMP Unknown)   SpO2 94%   BMI 32.46 kg/m²     General appearance: intubated.  Respiratory:  clear to auscultation bilaterally  Cardiovascular: Regular rate and rhythm, S1/S2  Abdomen: Soft, active bowel sounds.  Musculoskeletal: edema bilaterally.       Labs:   Recent Labs     07/31/24  0600 07/31/24  1224 08/01/24  0352 08/01/24  0503 08/01/24  1456 08/01/24  1601 08/01/24  2034 08/02/24  0444 08/02/24  0458   WBC 11.0*  --  13.1*  --   --   --   --   --  17.0*   HGB 6.5*   < > 9.0*   < > 9.5*   < > 9.2* 9.4* 9.7*   HCT 20.0*   < > 25.1*  --  27.8*  --   --   --  28.2*     --  144  --   --   --   --   --  97*    < > = values in this interval not displayed.       Recent Labs     08/01/24  1456 08/01/24  1601 08/01/24  2145 08/02/24  0444 08/02/24  0459   *  --  130*  --  132*   K 3.7  --  3.9  --  3.6   CL 95  --  95  --  99   CO2 21  --  20  --  21   BUN 25*  --  26*  --  20   CREATININE 4.03*   < > 4.17* 3.2* 3.27*   CALCIUM 9.5  --  9.6  --  9.1   PHOS 2.6  --  3.2  --  2.8    < > = values in this interval not displayed.  Zosyn/Vancomycin  - followed by critical care service    Ascites   - requiring large volume paracentesis x 2 in 4/24 and 7/8  - CT of abd/pelvis showed large volume ascites, small bilateral pleural effusions and anasarca   - s/p paracentesis on 7/30 with drainage of 4 liters of fluid  - fluid cell count not suggestive of SBP  - likely due to cardiac etiology per GI service     ESRD on IHD  - fistulogram not completed due to cardiac arrest  - started on CRRT  - followed by nephrology     PAF/AFL  - holding Apixaban due to bleeding  - monitor on telemetry     Hx of CAD s/p CABG/PCI, CVA  - no ASA due to bleeding     IDDM with hyperglycemia  - on ISS, Lantus     Chronic left knee PJI/OM   - on chronic suppressive therapy (Amoxicillin)    Diet: ADULT TUBE FEEDING; Orogastric; Peptide Based; Continuous; 20; No; 50; Q 8 hours    Code Status: Full Code          Electronically signed by WILEY BARROW MD on 8/2/2024 at 8:21 AM

## 2024-08-02 NOTE — PROGRESS NOTES
Nephrology Progress Note    Assessment:  64 y.o. year old female who presented to the emergency department for further evaluation and management of sudden onset of AV fistula bleed associated with hypotension with a blood pressure in the 50s admitted to critical care bed for further management though it did take place as per patient it seems like it did take place after pulling the bandage of the dialysis site  This clinical presentation did take place in a patient with past medical history of diabetes hypertension GERD COPD and she is on dialysis Monday Wednesday Friday anemia of chronic kidney disease secondary hyperparathyroidism and hyperphosphatemia     Patient still hypotensive with a blood pressure in the 99/38 no significant tachycardia no clinical or laboratory indication for dialysis  Careful examination of the patient AV fistula is functional left upper arm with felt thrill and heard bruit no evidence of active bleeding patient did improve her hemoglobin after packed RBCs transfusion    Now s/p cardiac arrest w/ ROSC following 1 round of CPR which occurred during fistulagram, venous outflow stenosis noted however procedure stopped due to code, started on CRRT 7/31 via temp catheter         Plan:  - ok to holding CRRT for now due to increased pressor requirements, will restart when able        Patient Active Problem List:     Coronary artery disease involving native coronary artery of native heart with angina pectoris (HCC)     Schizophrenia, paranoid, chronic     Metabolic syndrome     Vitamin B 12 deficiency     Cerebral microvascular disease     Mixed hyperlipidemia     Other hammer toe (acquired)     Vitamin D insufficiency     Incontinence of urine     Diabetic nephropathy with proteinuria (HCC)     Essential (primary) hypertension     History of type C viral hepatitis     Urinary incontinence due to cognitive impairment     History of seizures     Stented coronary artery-plan is to stay on Plavix     piperacillin-tazobactam  3,375 mg IntraVENous Q8H    vancomycin  25 mg/kg (Adjusted) IntraVENous Once    vancomycin (VANCOCIN) intermittent dosing (placeholder)   Other RX Placeholder    vancomycin  10 mg/kg (Adjusted) IntraVENous Q12H    metoclopramide  10 mg IntraVENous Q6H    lactulose  20 g Oral TID    miconazole   Topical BID    chlorhexidine  15 mL Mouth/Throat BID    insulin lispro  0-16 Units SubCUTAneous Q4H    pantoprazole (PROTONIX) 40 mg in sodium chloride (PF) 0.9 % 10 mL injection  40 mg IntraVENous Daily    sodium chloride flush  5-40 mL IntraVENous 2 times per day    sodium chloride flush  5-40 mL IntraVENous 2 times per day    Virt-Caps  1 capsule Oral Daily    Vitamin D  2,000 Units Oral Daily    ARIPiprazole  5 mg Oral Daily    fludrocortisone  0.1 mg Oral BID    sertraline  50 mg Oral Daily    miconazole   Topical BID    epoetin chadwick-epbx  8,000 Units SubCUTAneous Once per day on Mon Wed Fri    sodium chloride flush  5-40 mL IntraVENous 2 times per day    insulin glargine  35 Units SubCUTAneous Nightly    midodrine  20 mg Oral TID WC    [Held by provider] budesonide-formoterol  2 puff Inhalation BID RT    And    [Held by provider] tiotropium  2 puff Inhalation Daily RT    pill splitter   Does not apply Once     Continuous Infusions:   VASOpressin 20 Units in sodium chloride 0.9 % 100 mL infusion 0.03 Units/min (08/02/24 0757)    norepinephrine 35 mcg/min (08/02/24 0945)    prismaSATE BGK 4/2.5 200 mL/hr (08/01/24 2312)    midazolam 3 mg/hr (08/02/24 0725)    sodium chloride      sodium chloride      fentaNYL 300 mcg/hr (08/02/24 0835)    propofol 50 mcg/kg/min (08/02/24 0947)    prismaSATE BGK 4/2.5 1,000 mL/hr at 08/02/24 0952    prismaSATE BGK 4/2.5 1,000 mL/hr at 08/02/24 0950    sodium chloride      sodium chloride      sodium chloride      dextrose         CBC:   Recent Labs     08/01/24  0352 08/01/24  0503 08/02/24  0444 08/02/24  0458   WBC 13.1*  --   --  17.0*   HGB 9.0*   < > 9.4*  9.7*     --   --  97*    < > = values in this interval not displayed.       CMP:    Recent Labs     08/01/24  1456 08/01/24  1601 08/01/24  2145 08/02/24  0444 08/02/24  0459   *  --  130*  --  132*   K 3.7  --  3.9  --  3.6   CL 95  --  95  --  99   CO2 21  --  20  --  21   BUN 25*  --  26*  --  20   CREATININE 4.03*   < > 4.17* 3.2* 3.27*   GLUCOSE 92  --  92  --  110*   CALCIUM 9.5  --  9.6  --  9.1   LABGLOM 11.6*   < > 11.2* 15* 14.9*    < > = values in this interval not displayed.       Troponin: No results for input(s): \"TROPONINI\" in the last 72 hours.  BNP: No results for input(s): \"BNP\" in the last 72 hours.  INR:   Recent Labs     08/02/24  0830   INR 1.4       Lipids:   Recent Labs     07/31/24  1545   TRIG 151*       Liver:   Recent Labs     07/31/24  1044   AST 14   ALT 7   ALKPHOS 72   BILITOT 1.4*       Iron:  No results for input(s): \"FERRITIN\" in the last 72 hours.    Invalid input(s): \"IRONS\", \"LABIRONS\"    Urinalysis: No results for input(s): \"UA\" in the last 72 hours.    Objective:  Vitals: BP (!) 123/54   Pulse 85   Temp (!) 95.4 °F (35.2 °C) (Rectal)   Resp 20   Ht 1.727 m (5' 8\")   Wt 96.8 kg (213 lb 8 oz)   LMP  (LMP Unknown)   SpO2 97%   BMI 32.46 kg/m²    Wt Readings from Last 3 Encounters:   08/01/24 96.8 kg (213 lb 8 oz)   07/24/24 98.4 kg (217 lb)   07/15/24 98.4 kg (217 lb)      24HR INTAKE/OUTPUT:    Intake/Output Summary (Last 24 hours) at 8/2/2024 1116  Last data filed at 8/2/2024 1000  Gross per 24 hour   Intake 1861.98 ml   Output 1733 ml   Net 128.98 ml         General: intubated, sedated  HEENT: normocephalic, atraumatic, ETT in place  Neck: supple, no mass  Lungs: intubated, on vent, coarse breath sounds  Heart: regular rate and rhythm, no murmurs or rubs  Abdomen: soft, non-tender, non-distended  Ext: +++peripheral edema  Neuro: sedated        Electronically signed by Abel Larios MD

## 2024-08-02 NOTE — PROGRESS NOTES
CONSULTANT  Virgilio Howell DO      REQUESTING PHYSICIAN  Werner Ortiz MD                REASON FOR CONSULTATION       Chief Complaint   Patient presents with    Hypotension       Picked at HD site and started bleeding, hypotensive when EMS arrived         Hospital Day: 5         Patient is a 64 y.o. female who presents with a chief complaint of hypotension. Patient is followed on a regular basis by Adilene Tom MD. patient with multiple medical problems who presented with hypotension from dialysis center in the setting of acute bleeding from hemodialysis access she is currently on the vent and sedated.  Most of the information was obtained from the staff as well as the chart.  Patient apparently had a PEA arrest during IR intervention.  Had 1 round of CPR and 1 mg of epinephrine given  Patient with history of paroxysmal atrial fibrillation.  Had brief episode of A-fib however currently she is in normal sinus rhythm on telemetry    8-2-24: Remains on vent and sedated.  On Levophed and about to initiate vasopressin.  Patient on CRRT.  Also on normothermia protocol.  Appears to be in A-fib on telemetry.  Status post echocardiogram yesterday with ejection fraction of 55 to 60%     Past Medical History        Past Medical History:   Diagnosis Date    Angina at rest ([facility]) 5/24/2020    Anxiety      CAD S/P percutaneous coronary angioplasty 2015, 2018     stents per Huong Sanabria    CHF (congestive heart failure) ([facility])      CKD (chronic kidney disease) stage 4, GFR 15-29 ml/min ([facility]) 2/24/2018    CKD stage 4 due to type 2 diabetes mellitus ([facility])      Contusion of right chest wall 2/16/2021    COPD (chronic obstructive pulmonary disease) ([facility])      Diabetic nephropathy with proteinuria ([facility]) 2014    DJD (degenerative joint disease) of knee       Dr Sharma    GERD (gastroesophageal reflux disease)      Hemiparesis, left ([facility]) 2013     entered Assisted Living ([facility])    Hemodialysis patient ([facility])  (HCC)    Encephalopathy acute    MRSA bacteremia    Sepsis due to Enterococcus (HCC)    Local infection due to central venous catheter    DJD (degenerative joint disease), cervical    Closed head injury    Sarcopenia    Fall from standing    Sepsis due to skin infection (HCC)    Chronic hepatitis C (HCC)    Catheter-related bloodstream infection    Enterococcus faecalis infection    Cervical stenosis of spinal canal    Ataxic gait    Lumbar stenosis with neurogenic claudication    Falls infrequently    Infection of venous access port    Diarrhea    Acute anemia    Eczema    AMS (altered mental status)    Heart failure (HCC)    Interstitial lung disease (HCC)    Cellulitis of left hand    Chronic osteomyelitis of knee (HCC)    Generalized weakness    Shock (HCC)    Fluid overload    Encounter for hospice care discussion    Advanced care planning/counseling discussion    Goals of care, counseling/discussion    Palliative care encounter    Nonhealing nonsurgical wound    Skin picking habit    Hypotension    ESRD on dialysis (HCC)    Acute decompensated heart failure (HCC)    Constipation    Chronic antibiotic suppression    Chronic infection of prosthetic knee (HCC)    Lack of intravenous access    Pressure injury, unstageable, with suspected deep tissue injury (HCC)    Hypogammaglobulinemia (HCC)    Adrenal insufficiency (HCC)    Open wound of left hand without foreign body    Other ascites    Cirrhosis of liver with ascites (HCC)    Thrombocytopenia (HCC)    Blood loss anemia    Hemorrhage of arteriovenous fistula (HCC)         Past Surgical History         Past Surgical History:   Procedure Laterality Date    AV FISTULA CREATION Left 2023     [facility]     SECTION         x1    COLONOSCOPY   2014     Dr. Bello    CORONARY ANGIOPLASTY WITH STENT PLACEMENT         x1 Dr. Walker, Dr Borja 2018    CORONARY ANGIOPLASTY WITH STENT PLACEMENT   08/10/2021     [facility]    DIAGNOSTIC CARDIAC CATH  regurgitation with a centrally directed jet.    Mitral Valve: Moderately thickened leaflets. Mild regurgitation with a centrally directed jet. Mild stenosis noted.    Tricuspid Valve: Mild regurgitation with a centrally directed jet. Mildly elevated RVSP, consistent with mild pulmonary hypertension. The estimated RVSP is 42 mmHg.    Left Atrium: Left atrium is severely dilated.    Image quality is adequate. Technically difficult study due to patient's body habitus and procedure performed with the patient in a supine position.        No results found for this or any previous visit from the past 365 days.              ASSESSMENT:     Vent dependent respiratory failure  Hypovolemic shock.  From hemodialysis access site bleeding/acute blood loss  Status post brief PEA arrest  EF of 55 to 60% by echo on 8/1/2024  History of CAD status post PCI to LAD, history of CABG with LIMA to the LAD as well as tricuspid valve repair.  Surgery complicated by tamponade status post pericardial window  End-stage renal disease hemodialysis dependent  Hypertension  Hyperlipidemia  History of paroxysmal atrial fibrillation-appears to be in atrial fibrillation today  EF of 55 to 60% by echo on 7/31/2024  Mild pulmonary hypertension RVSP of 42 mm pete  Mild mitral stenosis  Ascites  Diabetes mellitus     PLAN:   As always, aggressive risk factor modification is strongly recommended. We should adhere to the JNC VIII guidelines for HTN management and the NCEPATP III guidelines for LDL-C management.  ICU supportive care and management  Wean vent off as tolerated.   Maximize cardiac medications  Wean pressors off as tolerated  No anticoagulation for now given acute blood loss anemia/hemodialysis access site bleed.  Resume when okay with team, hemoglobin is stable  Continue to monitor on telemetry  GI/DVT prophylaxis  Continue test between 4-5 magnesium greater than 2  Nephrology recommendations  Will continue to follow          Thank you for  allowing me to participate in the care of your patient, please don't hesitate to contact me if you have any further questions.

## 2024-08-02 NOTE — INTERDISCIPLINARY ROUNDS
Spiritual Care Services     Summary of Visit:  Pt. Intubated, on dialysis, and unresponsive.  assisted pt's daughter discuss her emotions and spiritual feelings. No advance directives on file but daughter is caretaker. Daughter is pensive, unsure about damion, but seems to be coping.  recommends continued visitation.   Encounter Summary  Encounter Overview/Reason: Initial Encounter, Interdisciplinary rounds, Family Care  Service Provided For: Patient and family together (patient intubated and unresponsive)  [facility]  Support System: Children  Last Encounter : 08/02/24  Complexity of Encounter: Moderate  Begin Time: 1021  End Time : 1042  Total Time Calculated: 21 min     Crisis  Type: Code Blue                         Spiritual Assessment/Intervention/Outcomes:    Assessment: Coping, Questioning damion, Stress overload, Tearful, Sad (for family.)    Intervention: Active listening, Confronted/Challenged, Discussed belief system/Islam practices/damion, Discussed relationship with God, Explored Coping Skills/Resources, Life review/Legacy (for family.)    Outcome: Comfort, Coping, Encouraged, Engaged in conversation, Expressed feelings, needs, and concerns, Peace, Venting emotion (for family.)      Care Plan:    Plan and Referrals  Plan/Referrals: Continue to visit, (comment) (if requested by family.)    Continue to visit pt. And family as needed. Prayer requested.      Spiritual Care Services   Electronically signed by Chaplain David on 8/2/2024 at 11:53 AM.    To reach a  for emotional and spiritual support, place an EPIC consult request.   If a  is needed immediately, dial “0” and ask to page the on-call .

## 2024-08-02 NOTE — PROGRESS NOTES
19:00-07:30 Shift Summary   Assessments completed (see emar). Pt remains on vent. IV medications titrated/infusing per orders (see emar). T/F infusing via OG per orders. Placement verified via respiratory rate, external length of 60cm, and gastric content. Residuals noted to be >400mL, per Dr. Stoner, place OG to LIWS and hold T/F. Arctic sun remains in place to keep pt normothermic. Bed bath and complete linen change provided. Mepilex changed to sacrum and heels. Labs drawn, labeled, and sent to lab. Call received from pts Angelina fernandez; update provided and understanding of all information provided. Bedside handoff report given to oncoming RN. Safety measures in place.

## 2024-08-02 NOTE — PROGRESS NOTES
Pulmonary & Critical Care Medicine ICU Progress Note  Chief complaint : Hypotension     Subjunctive/24 hour events :   Patient seen and examined during multidisciplinary rounds with RN, charge nurse, RT, pharmacy, dietitian, and social service.   Patient continues on the vent, FiO2 is 30% and peep of 5, O2 sats are 93%. She is sedated with fentanyl, propofol and versed. She is requiring levophed at 40 mcg/min and vasopressin 0.03 units/min. No fever overnight and anuric. Tolerating CRRT 1670 removed. Tolerating tube feeding and last BM 7/29/2024.     Social History     Tobacco Use    Smoking status: Never     Passive exposure: Never    Smokeless tobacco: Never   Substance Use Topics    Alcohol use: No     Alcohol/week: 0.0 standard drinks of alcohol         Problem Relation Age of Onset    Cancer Mother 68        survived    Breast Cancer Mother     Hypertension Father     Diabetes Sister     Mental Illness Sister     Colon Cancer Neg Hx        Recent Labs     08/01/24 2034 08/02/24  0444   PHART 7.485* 7.428   UYH3VQV 28* 35   PO2ART 95 76         MV Settings:  Vent Mode: AC/VC Resp Rate (Set): 26 bpm/Vt (Set, mL): 320 mL/ /FiO2 : 30 %           IV:   VASOpressin 20 Units in sodium chloride 0.9 % 100 mL infusion 0.03 Units/min (08/02/24 0757)    norepinephrine      prismaSATE BGK 4/2.5 200 mL/hr (08/01/24 2312)    midazolam 3 mg/hr (08/02/24 0725)    sodium chloride      sodium chloride      fentaNYL 300 mcg/hr (08/02/24 0835)    propofol 50 mcg/kg/min (08/02/24 0725)    prismaSATE BGK 4/2.5 1,000 mL/hr at 08/02/24 0433    prismaSATE BGK 4/2.5 1,000 mL/hr at 08/02/24 0429    sodium chloride      sodium chloride      sodium chloride      dextrose         Vitals:  BP (!) 123/54   Pulse 94   Temp (!) 94.8 °F (34.9 °C) (Rectal)   Resp (!) 32   Ht 1.727 m (5' 8\")   Wt 96.8 kg (213 lb 8 oz)   LMP  (LMP Unknown)   SpO2 93%   BMI 32.46 kg/m²    Tmax:       Intake/Output Summary (Last 24 hours) at 8/2/2024  heparin (porcine), naloxone 0.4 mg in 10 mL sodium chloride syringe, sodium chloride flush, sodium chloride, fentanNYL, HYDROmorphone, sodium chloride flush, sodium chloride, albuterol, melatonin, sodium chloride flush, sodium chloride, ondansetron **OR** ondansetron, polyethylene glycol, acetaminophen **OR** acetaminophen, glucose, dextrose bolus **OR** dextrose bolus, glucagon (rDNA), dextrose    Results: reviewed by me   CBC:   Recent Labs     07/31/24  0600 07/31/24  1224 08/01/24  0352 08/01/24  0503 08/01/24  1456 08/01/24  1601 08/01/24  2034 08/02/24  0444 08/02/24  0458   WBC 11.0*  --  13.1*  --   --   --   --   --  17.0*   HGB 6.5*   < > 9.0*   < > 9.5*   < > 9.2* 9.4* 9.7*   HCT 20.0*   < > 25.1*  --  27.8*  --   --   --  28.2*   MCV 91.3  --  84.2  --   --   --   --   --  86.8     --  144  --   --   --   --   --  97*    < > = values in this interval not displayed.       BMP:   Recent Labs     08/01/24  1456 08/01/24  1601 08/01/24  2145 08/02/24  0444 08/02/24  0459   *  --  130*  --  132*   K 3.7  --  3.9  --  3.6   CL 95  --  95  --  99   CO2 21  --  20  --  21   PHOS 2.6  --  3.2  --  2.8   BUN 25*  --  26*  --  20   CREATININE 4.03*   < > 4.17* 3.2* 3.27*    < > = values in this interval not displayed.       LIVER PROFILE:   Recent Labs     07/31/24  1044   AST 14   ALT 7   BILIDIR 0.9*   BILITOT 1.4*   ALKPHOS 72       PT/INR:   Recent Labs     07/31/24  1545   PROTIME 24.1*   INR 2.2       APTT:   Recent Labs     07/31/24  1545   APTT 45.8*       UA:No results for input(s): \"NITRITE\", \"COLORU\", \"PHUR\", \"LABCAST\", \"WBCUA\", \"RBCUA\", \"MUCUS\", \"TRICHOMONAS\", \"YEAST\", \"BACTERIA\", \"CLARITYU\", \"SPECGRAV\", \"LEUKOCYTESUR\", \"UROBILINOGEN\", \"BILIRUBINUR\", \"BLOODU\", \"GLUCOSEU\", \"AMORPHOUS\" in the last 72 hours.    Invalid input(s): \"KETONESU\"    Cultures:    IR US GUIDED PARACENTESIS    Result Date: 7/9/2024  1.  Status post technically successful ultrasound-guided paracentesis.  CLINICAL  HISTORY:VELASQUEZ GARBER is a Female of 64 years age, referred for Ultrasound Guided Paracentesis.  PROCEDURE: Survey of the abdomen showed large amount of ascites fluid. After obtaining informed consent, the patient was positioned supine on the sonography table. Using ultrasound, the skin over the left hemiabdomen was locally anesthetized with 1% lidocaine.  Following that, a Yueh needle was advanced into the fluid pocket using ultrasound visualization. 7600 cc, of clear yellow fluid were aspirated and sent for cytology, and pathology.  The needle was removed, and hemostasis was obtained with pressure.  A Band-Aid was placed. Post procedure images did not demonstrate hemorrhage at the target site. The patient tolerated the procedure well. The patient left the department in good condition. A radiology nurse was in presence monitoring vital signs, assisting throughout the procedure. Electronically signed by Eze White    CT ABDOMEN PELVIS WO CONTRAST Additional Contrast? None    Result Date: 7/1/2024  EXAMINATION: CT OF THE ABDOMEN AND PELVIS WITHOUT CONTRAST 7/1/2024 12:40 am TECHNIQUE: CT of the abdomen and pelvis was performed without the administration of intravenous contrast. Multiplanar reformatted images are provided for review. Automated exposure control, iterative reconstruction, and/or weight based adjustment of the mA/kV was utilized to reduce the radiation dose to as low as reasonably achievable. COMPARISON: 04/16/2024 HISTORY: ORDERING SYSTEM PROVIDED HISTORY: llq/luq pain TECHNOLOGIST PROVIDED HISTORY: Reason for exam:->llq/luq pain Additional Contrast?->None Decision Support Exception - unselect if not a suspected or confirmed emergency medical condition->Emergency Medical Condition (MA) What reading provider will be dictating this exam?->CRC FINDINGS: Lower Chest:   Cardiomegaly. Organs:   Cirrhotic appearance the liver.  Spleen, adrenal glands, pancreas are normal.  Bilateral renal atrophy.

## 2024-08-02 NOTE — PROGRESS NOTES
Palliative Care Progress Note  Patient: Michelle Le  Gender: female  YOB: 1960  Unit/Bed: IC16/IC16-01  CodeStatus: Full Code  Inpatient Treatment Team: Treatment Team: Attending Provider: Werner Ortiz MD; Consulting Physician: Vinh Castellano MD; Consulting Physician: Gabriella Almazan MD; Consulting Physician: Eze White MD; Consulting Physician: Carmen Pelletier MD; Consulting Physician: Virgilio Howell DO; Utilization Reviewer: Della Mock RN; Consulting Physician: Rodger Holcomb MD; Registered Nurse: Della Osullivan RN; : Ronna Santamaria; Consulting Physician: Bc Shipley MD  Admit Date:  7/27/2024    Chief Complaint: Blood loss anemia    History of Presenting Illness:      Michelle Le is a 64 y.o. female on hospital day 6 with a history of CAD s/p CABG/PCI, s/p TV repair, PAF/AFL, CVA with left sided weakness, IDDM2, ESRD on HD, anemia, schizophrenia, COVID-19 pneumonia, obesity, T11 fracture, hypogammaglobulinemia, E.Faecalis BSI in 7/2022, chronic left knee PJI/OM on chronic suppressive therapy with Amoxicillin.    Patient was brought to the emergency room via EMS from home for HD site bleeding. Patient hypotensive upon arrival. Patient was admitted in the setting of esrd, acute blood loss anemia, acute/chronic hypotension, ascites,     Palliative care was consulted by Dr. Ortiz for goals of care.     Patient is intubated and sedated, CRRT running. Patient was found to be in PEA, rosc achieved after 1 round compression performed. Required units of blood. Daughter currently at bedside with patient. Patient's previous functional status is criss lift per daughter at bedside. Patient has had overall functional decline in the last several months, multiple hospitalizations.  Patient had lengthy stay at [facility] went to [facility]. [facility] sent patient to [facility] and then patient was recently discharged home. Daughter provides 24/7 care. Daughter has been on  suture.  Patient's vital signs and stabilized and she was transported back to ICU. FINDINGS: Initial fistulogram showed complete occlusion of the brachial artery to basilic vein fistula graft at the venous anastomosis.  Injection of contrast past the occlusion showed very sluggish venous flow and stasis in the dilated area of outflow vein.  Arterial anastomosis appeared widely patent.     Thrombosed fistula with complete occlusion at the venous anastomosis.  Due to deterioration patient's condition the procedure was aborted and not completed.     IR THROMBECTOMY AVF PERC    Result Date: 8/1/2024  PROCEDURE: IR FISTULAGRAM; SP THROMBECTOMY PERCUT AVF; ULTRASOUND OF THE UPPER EXTREMITY FOR VEIN MAPPING MODERATE CONSCIOUS SEDATION 7/31/2024 HISTORY: ORDERING SYSTEM PROVIDED HISTORY: Blood loss anemia, Chronic renal failure, unspecified CKD stage TECHNOLOGIST PROVIDED HISTORY: Reason for exam:->Bleeding from fistula What reading provider will be dictating this exam?->CRC; ORDERING SYSTEM PROVIDED HISTORY: Blood loss anemia, Chronic renal failure, unspecified CKD stage TECHNOLOGIST PROVIDED HISTORY: Reason for exam:->bleeding fistula What reading provider will be dictating this exam?->CRC; ORDERING SYSTEM PROVIDED HISTORY: ESRD TECHNIQUE: Duplex ultrasound using B-mode/gray scaled imaging, Doppler spectral analysis and color flow Doppler was obtained of the unilateral venous upper extremity. CONTRAST: 35 mL iodinated cond SEDATION: 0versed and 25 mcg fentanyl were titrated intravenously for moderate sedation monitored under my direction.  Total intraservice time of sedation was 30 minutes.  The patient's vital signs were monitored throughout the procedure and recorded in the patient's medical record by the nurse. Mallapati score 2 ASA 3 FLUOROSCOPY DOSE AND TYPE: Radiation Exposure Index: 25.1 mGy, fluoro time 3.4 minutes DESCRIPTION OF PROCEDURE: Informed consent was obtained after a detailed explanation of the  addressed goals of care and wishes for family/patient, palliative care will sign off but available for further assistance if needed.      -Patient is currently being treated for multiple medical conditions by other providers  Pea cardiac arrest  Acute blood loss anemia  DM2  ESRD    MDM: HIGH    Electronically signed by LORETO Alvarado CNP on 8/2/2024 at 1:58 PM    Collaborating physician: Dr. Holland     Please note this report is partially produced by using speech recognition hardware.  It may contain errors related to the system, including grammar, punctuation and spelling as well as words and phrases that may seem inaccurate.  For any questions or concerns feel free to contact me for clarification.    Thanks for the opportunity you have allowed us to provide palliative care to Michelle Le. We will be in touch as care progresses. Please feel free to reach out to us should you have any questions or requests.

## 2024-08-03 ENCOUNTER — APPOINTMENT (OUTPATIENT)
Dept: CT IMAGING | Age: 64
End: 2024-08-03
Payer: MEDICARE

## 2024-08-03 LAB
ALBUMIN SERPL-MCNC: 3 G/DL (ref 3.5–4.6)
ANION GAP SERPL CALCULATED.3IONS-SCNC: 18 MEQ/L (ref 9–15)
BASE EXCESS ARTERIAL: -7 (ref -3–3)
BASE EXCESS ARTERIAL: -8 (ref -3–3)
BASOPHILS # BLD: 0.1 K/UL (ref 0–0.2)
BASOPHILS NFR BLD: 0.3 %
BUN SERPL-MCNC: 21 MG/DL (ref 8–23)
CA-I ADJ PH7.4 SERPL-SCNC: 1.3 MMOL/L (ref 1.09–1.3)
CA-I SERPL ISE-SCNC: 1.24 MMOL/L (ref 1.09–1.3)
CALCIUM IONIZED: 1.32 MMOL/L (ref 1.12–1.32)
CALCIUM IONIZED: 1.35 MMOL/L (ref 1.12–1.32)
CALCIUM SERPL-MCNC: 9.7 MG/DL (ref 8.5–9.9)
CHLORIDE SERPL-SCNC: 93 MEQ/L (ref 95–107)
CO2 SERPL-SCNC: 17 MEQ/L (ref 20–31)
CREAT SERPL-MCNC: 3.39 MG/DL (ref 0.5–0.9)
EOSINOPHIL # BLD: 0.1 K/UL (ref 0–0.7)
EOSINOPHIL NFR BLD: 0.2 %
ERYTHROCYTE [DISTWIDTH] IN BLOOD BY AUTOMATED COUNT: 21.1 % (ref 11.5–14.5)
GLUCOSE BLD-MCNC: 162 MG/DL (ref 70–99)
GLUCOSE BLD-MCNC: 192 MG/DL (ref 70–99)
GLUCOSE BLD-MCNC: 208 MG/DL (ref 70–99)
GLUCOSE BLD-MCNC: 211 MG/DL (ref 70–99)
GLUCOSE BLD-MCNC: 214 MG/DL (ref 70–99)
GLUCOSE BLD-MCNC: 219 MG/DL (ref 70–99)
GLUCOSE SERPL-MCNC: 190 MG/DL (ref 70–99)
HCO3 ARTERIAL: 18.5 MMOL/L (ref 21–29)
HCO3 ARTERIAL: 19.7 MMOL/L (ref 21–29)
HCT VFR BLD AUTO: 29 % (ref 36–48)
HCT VFR BLD AUTO: 29.9 % (ref 37–47)
HCT VFR BLD AUTO: 31 % (ref 36–48)
HGB BLD CALC-MCNC: 10.5 GM/DL (ref 12–16)
HGB BLD CALC-MCNC: 9.8 GM/DL (ref 12–16)
HGB BLD-MCNC: 10.5 G/DL (ref 12–16)
LACTATE: 2.67 MMOL/L (ref 0.4–2)
LACTATE: 2.86 MMOL/L (ref 0.4–2)
LYMPHOCYTES # BLD: 0.8 K/UL (ref 1–4.8)
LYMPHOCYTES NFR BLD: 2.4 %
MCH RBC QN AUTO: 32.4 PG (ref 27–31.3)
MCHC RBC AUTO-ENTMCNC: 35.1 % (ref 33–37)
MCV RBC AUTO: 92.3 FL (ref 79.4–94.8)
MONOCYTES # BLD: 0.9 K/UL (ref 0.2–0.8)
MONOCYTES NFR BLD: 2.6 %
NEUTROPHILS # BLD: 30.8 K/UL (ref 1.4–6.5)
NEUTS SEG NFR BLD: 92.5 %
O2 SAT, ARTERIAL: 98 % (ref 93–100)
O2 SAT, ARTERIAL: 99 % (ref 93–100)
PATH INTERP BLD-IMP: YES
PCO2 ARTERIAL: 34 MM HG (ref 35–45)
PCO2 ARTERIAL: 51 MM HG (ref 35–45)
PERFORMED ON: ABNORMAL
PH ARTERIAL: 7.2 (ref 7.35–7.45)
PH ARTERIAL: 7.34 (ref 7.35–7.45)
PHOSPHATE SERPL-MCNC: 6.1 MG/DL (ref 2.3–4.8)
PLATELET # BLD AUTO: 95 K/UL (ref 130–400)
PLATELET BLD QL SMEAR: ABNORMAL
PO2 ARTERIAL: 103 MM HG (ref 75–108)
PO2 ARTERIAL: 147 MM HG (ref 75–108)
POC CHLORIDE: 101 MEQ/L (ref 99–110)
POC CHLORIDE: 101 MEQ/L (ref 99–110)
POC CREATININE: 3.5 MG/DL (ref 0.6–1.2)
POC CREATININE: 3.6 MG/DL (ref 0.6–1.2)
POC FIO2: 50
POC FIO2: 70
POC SAMPLE TYPE: ABNORMAL
POC SAMPLE TYPE: ABNORMAL
POTASSIUM SERPL-SCNC: 5.3 MEQ/L (ref 3.4–4.9)
POTASSIUM SERPL-SCNC: 5.3 MEQ/L (ref 3.5–5.1)
POTASSIUM SERPL-SCNC: 5.4 MEQ/L (ref 3.5–5.1)
PROCALCITONIN SERPL IA-MCNC: 6.18 NG/ML (ref 0–0.15)
RBC # BLD AUTO: 3.24 M/UL (ref 4.2–5.4)
SLIDE REVIEW: ABNORMAL
SODIUM BLD-SCNC: 130 MEQ/L (ref 136–145)
SODIUM BLD-SCNC: 130 MEQ/L (ref 136–145)
SODIUM SERPL-SCNC: 128 MEQ/L (ref 135–144)
TCO2 ARTERIAL: 20 MMOL/L (ref 21–32)
TCO2 ARTERIAL: 21 MMOL/L (ref 21–32)
TRIGL SERPL-MCNC: 410 MG/DL (ref 0–150)
VANCOMYCIN TROUGH SERPL-MCNC: 18.2 UG/ML (ref 10–20)
WBC # BLD AUTO: 33.2 K/UL (ref 4.8–10.8)

## 2024-08-03 PROCEDURE — 2500000003 HC RX 250 WO HCPCS: Performed by: NURSE PRACTITIONER

## 2024-08-03 PROCEDURE — 2700000000 HC OXYGEN THERAPY PER DAY

## 2024-08-03 PROCEDURE — 84145 PROCALCITONIN (PCT): CPT

## 2024-08-03 PROCEDURE — 2000000000 HC ICU R&B

## 2024-08-03 PROCEDURE — 80069 RENAL FUNCTION PANEL: CPT

## 2024-08-03 PROCEDURE — 6370000000 HC RX 637 (ALT 250 FOR IP): Performed by: NURSE PRACTITIONER

## 2024-08-03 PROCEDURE — 2580000003 HC RX 258: Performed by: ANESTHESIOLOGY

## 2024-08-03 PROCEDURE — 99233 SBSQ HOSP IP/OBS HIGH 50: CPT | Performed by: INTERNAL MEDICINE

## 2024-08-03 PROCEDURE — 37799 UNLISTED PX VASCULAR SURGERY: CPT

## 2024-08-03 PROCEDURE — 6360000002 HC RX W HCPCS: Performed by: INTERNAL MEDICINE

## 2024-08-03 PROCEDURE — 82803 BLOOD GASES ANY COMBINATION: CPT

## 2024-08-03 PROCEDURE — 2580000003 HC RX 258: Performed by: COLON & RECTAL SURGERY

## 2024-08-03 PROCEDURE — 82330 ASSAY OF CALCIUM: CPT

## 2024-08-03 PROCEDURE — 6370000000 HC RX 637 (ALT 250 FOR IP): Performed by: COLON & RECTAL SURGERY

## 2024-08-03 PROCEDURE — 83605 ASSAY OF LACTIC ACID: CPT

## 2024-08-03 PROCEDURE — 94003 VENT MGMT INPAT SUBQ DAY: CPT

## 2024-08-03 PROCEDURE — 2580000003 HC RX 258: Performed by: INTERNAL MEDICINE

## 2024-08-03 PROCEDURE — 84295 ASSAY OF SERUM SODIUM: CPT

## 2024-08-03 PROCEDURE — 87186 SC STD MICRODIL/AGAR DIL: CPT

## 2024-08-03 PROCEDURE — 6370000000 HC RX 637 (ALT 250 FOR IP): Performed by: INTERNAL MEDICINE

## 2024-08-03 PROCEDURE — 6360000002 HC RX W HCPCS: Performed by: NURSE PRACTITIONER

## 2024-08-03 PROCEDURE — 82565 ASSAY OF CREATININE: CPT

## 2024-08-03 PROCEDURE — 87077 CULTURE AEROBIC IDENTIFY: CPT

## 2024-08-03 PROCEDURE — 99291 CRITICAL CARE FIRST HOUR: CPT | Performed by: INTERNAL MEDICINE

## 2024-08-03 PROCEDURE — 85025 COMPLETE CBC W/AUTO DIFF WBC: CPT

## 2024-08-03 PROCEDURE — 2580000003 HC RX 258: Performed by: NURSE PRACTITIONER

## 2024-08-03 PROCEDURE — 80202 ASSAY OF VANCOMYCIN: CPT

## 2024-08-03 PROCEDURE — 74176 CT ABD & PELVIS W/O CONTRAST: CPT

## 2024-08-03 PROCEDURE — 84132 ASSAY OF SERUM POTASSIUM: CPT

## 2024-08-03 PROCEDURE — 84478 ASSAY OF TRIGLYCERIDES: CPT

## 2024-08-03 PROCEDURE — 2500000003 HC RX 250 WO HCPCS: Performed by: INTERNAL MEDICINE

## 2024-08-03 PROCEDURE — 85014 HEMATOCRIT: CPT

## 2024-08-03 PROCEDURE — 87070 CULTURE OTHR SPECIMN AEROBIC: CPT

## 2024-08-03 PROCEDURE — 87040 BLOOD CULTURE FOR BACTERIA: CPT

## 2024-08-03 PROCEDURE — 82435 ASSAY OF BLOOD CHLORIDE: CPT

## 2024-08-03 RX ORDER — ALBUMIN (HUMAN) 12.5 G/50ML
25 SOLUTION INTRAVENOUS ONCE
Status: DISCONTINUED | OUTPATIENT
Start: 2024-08-03 | End: 2024-08-03

## 2024-08-03 RX ADMIN — SERTRALINE HYDROCHLORIDE 50 MG: 50 TABLET ORAL at 09:02

## 2024-08-03 RX ADMIN — ARIPIPRAZOLE 5 MG: 2 TABLET ORAL at 09:11

## 2024-08-03 RX ADMIN — HYDROCORTISONE SODIUM SUCCINATE 100 MG: 100 INJECTION, POWDER, FOR SOLUTION INTRAMUSCULAR; INTRAVENOUS at 01:10

## 2024-08-03 RX ADMIN — INSULIN LISPRO 4 UNITS: 100 INJECTION, SOLUTION INTRAVENOUS; SUBCUTANEOUS at 16:25

## 2024-08-03 RX ADMIN — Medication 2000 UNITS: at 09:02

## 2024-08-03 RX ADMIN — CHLORHEXIDINE GLUCONATE 15 ML: 1.2 RINSE ORAL at 21:26

## 2024-08-03 RX ADMIN — VASOPRESSIN 0.03 UNITS/MIN: 20 INJECTION INTRAVENOUS at 16:32

## 2024-08-03 RX ADMIN — Medication 29 MCG/MIN: at 02:24

## 2024-08-03 RX ADMIN — Medication 75 MCG/HR: at 08:20

## 2024-08-03 RX ADMIN — INSULIN LISPRO 4 UNITS: 100 INJECTION, SOLUTION INTRAVENOUS; SUBCUTANEOUS at 08:48

## 2024-08-03 RX ADMIN — Medication 300 MCG/HR: at 02:35

## 2024-08-03 RX ADMIN — MEROPENEM 500 MG: 500 INJECTION, POWDER, FOR SOLUTION INTRAVENOUS at 11:34

## 2024-08-03 RX ADMIN — Medication 10 ML: at 21:35

## 2024-08-03 RX ADMIN — LACTULOSE 20 G: 20 SOLUTION ORAL at 21:26

## 2024-08-03 RX ADMIN — CHLORHEXIDINE GLUCONATE 15 ML: 1.2 RINSE ORAL at 09:13

## 2024-08-03 RX ADMIN — NEPHROCAP 1 MG: 1 CAP ORAL at 09:02

## 2024-08-03 RX ADMIN — PROPOFOL 35 MCG/KG/MIN: 10 INJECTION, EMULSION INTRAVENOUS at 06:53

## 2024-08-03 RX ADMIN — VASOPRESSIN 0.03 UNITS/MIN: 20 INJECTION INTRAVENOUS at 03:47

## 2024-08-03 RX ADMIN — MIDODRINE HYDROCHLORIDE 20 MG: 10 TABLET ORAL at 16:16

## 2024-08-03 RX ADMIN — Medication 10 ML: at 09:03

## 2024-08-03 RX ADMIN — MIDODRINE HYDROCHLORIDE 20 MG: 10 TABLET ORAL at 12:29

## 2024-08-03 RX ADMIN — FLUDROCORTISONE ACETATE 0.1 MG: 0.1 TABLET ORAL at 09:02

## 2024-08-03 RX ADMIN — LACTULOSE 20 G: 20 SOLUTION ORAL at 09:01

## 2024-08-03 RX ADMIN — Medication 32 MCG/MIN: at 11:37

## 2024-08-03 RX ADMIN — FLUDROCORTISONE ACETATE 0.1 MG: 0.1 TABLET ORAL at 21:26

## 2024-08-03 RX ADMIN — PIPERACILLIN AND TAZOBACTAM 3375 MG: 3; .375 INJECTION, POWDER, LYOPHILIZED, FOR SOLUTION INTRAVENOUS at 01:13

## 2024-08-03 RX ADMIN — MICONAZOLE NITRATE: 2 POWDER TOPICAL at 21:36

## 2024-08-03 RX ADMIN — INSULIN LISPRO 4 UNITS: 100 INJECTION, SOLUTION INTRAVENOUS; SUBCUTANEOUS at 05:25

## 2024-08-03 RX ADMIN — DOCUSATE SODIUM 100 MG: 50 LIQUID ORAL at 21:26

## 2024-08-03 RX ADMIN — DOCUSATE SODIUM 100 MG: 50 LIQUID ORAL at 09:01

## 2024-08-03 RX ADMIN — LACTULOSE 20 G: 20 SOLUTION ORAL at 16:16

## 2024-08-03 RX ADMIN — PROPOFOL 45 MCG/KG/MIN: 10 INJECTION, EMULSION INTRAVENOUS at 04:08

## 2024-08-03 RX ADMIN — HYDROCORTISONE SODIUM SUCCINATE 100 MG: 100 INJECTION, POWDER, FOR SOLUTION INTRAMUSCULAR; INTRAVENOUS at 08:58

## 2024-08-03 RX ADMIN — POLYETHYLENE GLYCOL 3350 17 G: 17 POWDER, FOR SOLUTION ORAL at 09:02

## 2024-08-03 RX ADMIN — MIDODRINE HYDROCHLORIDE 20 MG: 10 TABLET ORAL at 09:01

## 2024-08-03 RX ADMIN — PIPERACILLIN AND TAZOBACTAM 3375 MG: 3; .375 INJECTION, POWDER, LYOPHILIZED, FOR SOLUTION INTRAVENOUS at 09:05

## 2024-08-03 RX ADMIN — HYDROCORTISONE SODIUM SUCCINATE 100 MG: 100 INJECTION, POWDER, FOR SOLUTION INTRAMUSCULAR; INTRAVENOUS at 17:53

## 2024-08-03 RX ADMIN — INSULIN LISPRO 4 UNITS: 100 INJECTION, SOLUTION INTRAVENOUS; SUBCUTANEOUS at 21:26

## 2024-08-03 RX ADMIN — SODIUM CHLORIDE, PRESERVATIVE FREE 40 MG: 5 INJECTION INTRAVENOUS at 08:58

## 2024-08-03 RX ADMIN — Medication 10 ML: at 21:34

## 2024-08-03 RX ADMIN — MICONAZOLE NITRATE: 20 CREAM TOPICAL at 21:36

## 2024-08-03 RX ADMIN — INSULIN GLARGINE 35 UNITS: 100 INJECTION, SOLUTION SUBCUTANEOUS at 21:25

## 2024-08-03 ASSESSMENT — PULMONARY FUNCTION TESTS
PIF_VALUE: 28
PIF_VALUE: 27
PIF_VALUE: 31
PIF_VALUE: 29
PIF_VALUE: 28
PIF_VALUE: 27
PIF_VALUE: 27
PIF_VALUE: 28
PIF_VALUE: 28
PIF_VALUE: 27
PIF_VALUE: 28
PIF_VALUE: 25
PIF_VALUE: 30
PIF_VALUE: 28

## 2024-08-03 NOTE — PROGRESS NOTES
Pulmonary & Critical Care Medicine ICU Progress Note  Chief complaint : Shock    Subjunctive/24 hour events :   Patient seen and examined during multidisciplinary rounds with RN, charge nurse, RT, pharmacy, dietitian, and social service.   Unresponsive on vent, no fever, she is on Levophed at 28 mg/min, and vasopressin 0.03 units/min.  She is off CRRT due to worsening shock, currently Levophed requirement decreased, no bowel movement, and no urine output.  She is +7 L.        New information updated in the note today, rest of the examination did not change compared to yesterday.  Social History     Tobacco Use    Smoking status: Never     Passive exposure: Never    Smokeless tobacco: Never   Substance Use Topics    Alcohol use: No     Alcohol/week: 0.0 standard drinks of alcohol         Problem Relation Age of Onset    Cancer Mother 68        survived    Breast Cancer Mother     Hypertension Father     Diabetes Sister     Mental Illness Sister     Colon Cancer Neg Hx        Recent Labs     08/03/24  0443 08/03/24  0802   PHART 7.196* 7.344*   [id number] 51* 34*   PO2ART 147* 103       MV Settings:  Vent Mode: AC/VC Resp Rate (Set): 30 bpm/Vt (Set, mL): 350 mL/ /FiO2 : 50 %           IV:   VASOpressin 20 Units in sodium chloride 0.9 % 100 mL infusion 0.03 Units/min (08/03/24 0650)    norepinephrine 28 mcg/min (08/03/24 0650)    prismaSATE BGK 4/2.5 200 mL/hr (08/01/24 2312)    midazolam 1 mg/hr (08/03/24 0248)    fentaNYL 75 mcg/hr (08/03/24 0820)    propofol 35 mcg/kg/min (08/03/24 0653)    prismaSATE BGK 4/2.5 1,000 mL/hr at 08/02/24 0952    prismaSATE BGK 4/2.5 1,000 mL/hr at 08/02/24 0950    sodium chloride      sodium chloride      sodium chloride      dextrose         Vitals:  BP (!) 118/51   Pulse 86   Temp 99 °F (37.2 °C)   Resp 30   Ht 1.727 m (5' 8\")   Wt 96.8 kg (213 lb 8 oz)   LMP  (LMP Unknown)   SpO2 97%   BMI 32.46 kg/m²    Tmax:        Intake/Output Summary (Last 24 hours) at 8/3/2024 0930  Last  \"RBCUA\", \"MUCUS\", \"TRICHOMONAS\", \"YEAST\", \"BACTERIA\", \"CLARITYU\", \"SPECGRAV\", \"LEUKOCYTESUR\", \"UROBILINOGEN\", \"BILIRUBINUR\", \"BLOODU\", \"GLUCOSEU\", \"AMORPHOUS\" in the last 72 hours.    Invalid input(s): \"KETONESU\"    Cultures:  Negative so far  Films:  CXR films reviewed by me and it showed no acute infiltrate      Assessment:  This is a critically ill patient at risk of deterioration / death , needing close ICU monitoring and intervention due to below noted problems     Postcardiac arrest  Septic shock  Acute encephalopathy postcardiac arrest  Acute respiratory insufficiency  Hypoadrenalism  Anemia, secondary to blood loss from the fistula  End-stage renal disease on CRRT  Adrenal insufficiency  Ascites status post paracentesis 7/30/2020,  4 L removed  Bleed from dialysis site  A-fib, cannot anticoagulate due to recurrent bleed and blood transfusion  Hypereosinophilia     Recommendation  Vent support lung protective strategy  head of the bed 30°  Daily sedation holidays and breathing trials  Sedation with combination propofol and fentanyl target R ASS of 0 to -1  Watch for ICU delirium: TV on, natural light, avoid benzos, pain control, early mobility, and family engagement  PUD prophylaxis  DVT prophylaxis  Start tube feed  Target blood sugar 140-180  Levophed to maintain MAP ~65-70  Vasopressin  Monitor and replace electrolyte as needed  Hold CRRT for now  Change antibiotic to meropenem  CT abdomen pelvis  Hold Abilify and Zoloft for now  Respiratory and blood cultures       Due to the immediate potential for life-threatening deterioration due to septic shock I spent 40  minutes providing critical care.  This time is excluding time spent performing procedures.          Electronically signed by Say Marsh MD,  [facility] ,on 8/3/2024 at 9:30 AM

## 2024-08-03 NOTE — PROGRESS NOTES
[facility]   Pharmacy Dose Adjustment Per Protocol:  Meropenem Extended Interval Interchange    Michelle Le is a 64 y.o. female.     The following ordered dose of Meropenem has been changed to optimize its pharmacodynamic profile per [facility] pharmacy policy approved by [facility].    Recent Labs     08/02/24  0458 08/02/24  0459 08/03/24  0443 08/03/24  0500 08/03/24  0802   CREATININE  --  3.27*   < > 3.39* 3.6*   BUN  --  20  --  21  --    WBC 17.0*  --   --  33.2*  --     < > = values in this interval not displayed.     .  Height: 172.7 cm (5' 8\"), Weight - Scale: 96.8 kg (213 lb 8 oz), Body mass index is 32.46 kg/m².  Estimated Creatinine Clearance: 19 mL/min (A) (based on SCr of 3.6 mg/dL (H)).    Ordered Dose    _x_ 500 mg IV every 8 hrs       New Dose    ID needs consulted for therapy to extend beyond 72 hr    Meropenem - Extended Infusion (3-hour infusion) - Preferred Dosing Strategy    Renal function (CrCl mL/min)  ? 50  26 - 49  10 - 25   < 10, HD, PD  CRRT    All indications - Loading dose of 4920-7593 milligrams x 1 over 30 minutes or via IV push (based on indication). Maintenance dose should begin at the next regularly scheduled dosing interval based on indication/renal function.    Maintenance dosing for all indications except as outlined below  1000mg q8h ¨ 1000mg q12h ¨ 500 mg q12h ¨ 500 mg q24h ¨ 1000mg q12h^ x   CNS infections, Cystic fibrosis, DIYA > 4  2000mg q8h ¨ 2000mg q12h ¨ 1000mg q12h ¨ 1000mg q24h ¨ 2000mg q12h† ¨    ^Consider 1000mg q8h for CRRT effluent rates > 3L/h   †Consider 2000mg q8h for CRRT effluent rates ? 3L/h       Pharmacists should be contacted for issues concerning drug compatibility with multiple IV medications.  All doses will be prepared using 100ml bag to be infused over 3-hours at a rate of 33.3 ml/hr.    Thank You,  Myriam Caputo [facility]  8/3/2024 9:52 AM

## 2024-08-03 NOTE — PROGRESS NOTES
Hospitalist Progress Note      PCP: Adilene Terry MD    Date of Admission: 7/27/2024    Chief Complaint:  remains intubated, sedated with Propofol and Fentanyl, hypotensive requiring Norepinephrine and Vasopressin support    Medications:  Reviewed    Infusion Medications    VASOpressin 20 Units in sodium chloride 0.9 % 100 mL infusion 0.03 Units/min (08/03/24 0650)    norepinephrine 28 mcg/min (08/03/24 0650)    prismaSATE BGK 4/2.5 200 mL/hr (08/01/24 2312)    midazolam 1 mg/hr (08/03/24 0248)    fentaNYL 75 mcg/hr (08/03/24 0820)    propofol 35 mcg/kg/min (08/03/24 0653)    prismaSATE BGK 4/2.5 1,000 mL/hr at 08/02/24 0952    prismaSATE BGK 4/2.5 1,000 mL/hr at 08/02/24 0950    sodium chloride      sodium chloride      sodium chloride      dextrose       Scheduled Medications    hydrocortisone sodium succinate PF  100 mg IntraVENous Q8H    docusate  100 mg Oral BID    polyethylene glycol  17 g Oral Daily    piperacillin-tazobactam  3,375 mg IntraVENous Q8H    vancomycin (VANCOCIN) intermittent dosing (placeholder)   Other RX Placeholder    lactulose  20 g Oral TID    miconazole   Topical BID    chlorhexidine  15 mL Mouth/Throat BID    insulin lispro  0-16 Units SubCUTAneous Q4H    pantoprazole (PROTONIX) 40 mg in sodium chloride (PF) 0.9 % 10 mL injection  40 mg IntraVENous Daily    sodium chloride flush  5-40 mL IntraVENous 2 times per day    sodium chloride flush  5-40 mL IntraVENous 2 times per day    Virt-Caps  1 capsule Oral Daily    Vitamin D  2,000 Units Oral Daily    ARIPiprazole  5 mg Oral Daily    fludrocortisone  0.1 mg Oral BID    sertraline  50 mg Oral Daily    miconazole   Topical BID    epoetin chadwick-epbx  8,000 Units SubCUTAneous Once per day on Mon Wed Fri    sodium chloride flush  5-40 mL IntraVENous 2 times per day    insulin glargine  35 Units SubCUTAneous Nightly    midodrine  20 mg Oral TID WC    [Held by provider] budesonide-formoterol  2 puff Inhalation BID RT    And    [Held by provider]  in the setting of blood loss, adrenal insufficiency  - on Norepinephrine/Vasopressin infusion, Midodrine, Hydrocortisone, Florinef  - started on Zosyn/Vancomycin  - followed by critical care service    Ascites   - requiring large volume paracentesis x 2 in 4/24 and 7/8  - CT of abd/pelvis showed large volume ascites, small bilateral pleural effusions and anasarca   - s/p paracentesis on 7/30 with drainage of 4 liters of fluid  - fluid cell count not suggestive of SBP  - likely due to cardiac etiology per GI service     ESRD on IHD  - fistulogram not completed due to cardiac arrest  - started on CRRT  - followed by nephrology    Thrombocytopenia, coagulopathy  - likely related to acute illness  - PT/PTT improving  - follow platelets     PAF/AFL  - holding Apixaban due to bleeding  - monitor on telemetry     Hx of CAD s/p CABG/PCI, CVA  - no ASA due to bleeding     IDDM with hyperglycemia  - on ISS, Lantus     Chronic left knee PJI/OM   - on chronic suppressive therapy (Amoxicillin)    Diet: ADULT TUBE FEEDING; Orogastric; Peptide Based; Continuous; 20; No; 50; Q 8 hours    Code Status: Full Code, followed by palliative care          Electronically signed by WILEY BARROW MD on 8/3/2024 at 8:23 AM

## 2024-08-03 NOTE — PROGRESS NOTES
[facility]   Pharmacy Pharmacokinetic Monitoring Service - Vancomycin    Michelle Le is a 64 y.o. female starting on vancomycin therapy for sepsis. Pharmacy consulted by Milagros Hanna NP for monitoring and adjustment.    Target Concentration: CRRT dosing trough < 15  Additional Antimicrobials: merrem    Pertinent Laboratory Values:   Wt Readings from Last 1 Encounters:   08/01/24 96.8 kg (213 lb 8 oz)     Temp Readings from Last 1 Encounters:   08/03/24 99 °F (37.2 °C)     Recent Labs     08/02/24  0458 08/02/24  0459 08/03/24  0443 08/03/24  0500 08/03/24  0802   CREATININE  --  3.27*   < > 3.39* 3.6*   BUN  --  20  --  21  --    WBC 17.0*  --   --  33.2*  --     < > = values in this interval not displayed.           Plan:  Concentration-guided dosing due to renal impairment and CRRT -currently on HOLD to be re-assessed 08/4/24  Current trough 18.2 --HOLD further vanco until CRRT re-started  Vancomycin concentration to be ordered when further CRRT orders clarified  Pharmacy will continue to monitor patient and adjust therapy as indicated    Thank you for the consult,  Bibiana Barron RPH  8/3/2024 11:08 AM

## 2024-08-03 NOTE — PROGRESS NOTES
Nephrology Progress Note    Assessment:  ESRDX-anasarca  Hypotension-Levo  Hyponatremia fluid overload  Ventilator assistance  Schizophrenia  Anemia  DM type-2    Plan:will not restart CRRT once back  from CT abdomen  prognosis poor discussed with with daughter poor prognosis  Following BP  chronic low    Patient Active Problem List:     Coronary artery disease involving native coronary artery of native heart with angina pectoris (HCC)     Schizophrenia, paranoid, chronic     Metabolic syndrome     Vitamin B 12 deficiency     Cerebral microvascular disease     Mixed hyperlipidemia     Other hammer toe (acquired)     Vitamin D insufficiency     Incontinence of urine     Diabetic nephropathy with proteinuria (HCC)     Essential (primary) hypertension     History of type C viral hepatitis     Urinary incontinence due to cognitive impairment     History of seizures     Stented coronary artery-plan is to stay on Plavix indefinately per Dr Walker     Diabetes mellitus ([facility])     Controlled type 2 diabetes mellitus with diabetic neuropathy, with long-term current use of insulin ([facility])     Hemiparesis, left ([facility])     Angina, class II ([facility])     Pain, unspecified     Tardive dyskinesia     Shortness of breath     Poorly controlled diabetes mellitus ([facility])     Chronic diastolic congestive heart failure ([facility])     [place] (obstructive sleep apnea)     Pulmonary hypertension, unspecified ([facility])     Class 2 severe obesity with serious comorbidity and body mass index (BMI) of 36.0 to 36.9 in adult (HCC)     Edema     Closed supracondylar fracture of right humerus     Other chronic pain     Palliative care patient     Recurrent falls     Chronic renal failure     Difficulty in walking     ESRD (end stage renal disease) on dialysis (HCC)     Weakness     Other seizures ([facility])     Moderate persistent asthma without complication     COVID-19     Post PTCA     Falls frequently     Chest wall contusion, left, initial encounter     Headache,  tiotropium  2 puff Inhalation Daily RT    pill splitter   Does not apply Once     Continuous Infusions:   VASOpressin 20 Units in sodium chloride 0.9 % 100 mL infusion 0.03 Units/min (08/03/24 0650)    norepinephrine 28 mcg/min (08/03/24 0650)    prismaSATE BGK 4/2.5 200 mL/hr (08/01/24 2312)    midazolam 1 mg/hr (08/03/24 0248)    fentaNYL 75 mcg/hr (08/03/24 0820)    propofol 35 mcg/kg/min (08/03/24 0653)    prismaSATE BGK 4/2.5 1,000 mL/hr at 08/02/24 0952    prismaSATE BGK 4/2.5 1,000 mL/hr at 08/02/24 0950    sodium chloride      sodium chloride      sodium chloride      dextrose         CBC:   Recent Labs     08/02/24  0458 08/03/24  0443 08/03/24  0500 08/03/24  0802   WBC 17.0*  --  33.2*  --    HGB 9.7*   < > 10.5* 9.8*   PLT 97*  --  95*  --     < > = values in this interval not displayed.     CMP:    Recent Labs     08/01/24  2145 08/02/24  0444 08/02/24  0459 08/03/24  0443 08/03/24  0500 08/03/24  0802   *  --  132*  --  128*  --    K 3.9  --  3.6  --  5.3*  --    CL 95  --  99  --  93*  --    CO2 20  --  21  --  17*  --    BUN 26*  --  20  --  21  --    CREATININE 4.17*   < > 3.27* 3.5* 3.39* 3.6*   GLUCOSE 92  --  110*  --  190*  --    CALCIUM 9.6  --  9.1  --  9.7  --    LABGLOM 11.2*   < > 14.9* 14* 14.3* 13*    < > = values in this interval not displayed.     Troponin: No results for input(s): \"TROPONINI\" in the last 72 hours.  BNP: No results for input(s): \"BNP\" in the last 72 hours.  INR:   Recent Labs     08/02/24  1202   INR 1.6     Lipids:   Recent Labs     07/31/24  1545   TRIG 151*     Liver:   Recent Labs     08/02/24  1202   AST NOT DONE   ALT NOT DONE   ALKPHOS 108   BILITOT 2.4*     Iron:  No results for input(s): \"FERRITIN\" in the last 72 hours.    Invalid input(s): \"IRONS\", \"LABIRONS\"  Urinalysis: No results for input(s): \"UA\" in the last 72 hours.    Objective:  Vitals: BP (!) 118/51   Pulse 86   Temp 99 °F (37.2 °C)   Resp 30   Ht 1.727 m (5' 8\")   Wt 96.8 kg (213 lb 8 oz)

## 2024-08-03 NOTE — PROGRESS NOTES
Hematology/Oncology   Progress Note        CHIEF COMPLAINT/HPI:  Follow up of anemia. Hemoglobin at 9.8. Had ferlecet yesterday and today. Still on a vent.     REVIEW OF SYSTEMS:    Unremarkable except for symptoms mentioned in HPI.    Current Inpatient Medications:    Current Facility-Administered Medications   Medication Dose Route Frequency Provider Last Rate Last Admin    meropenem (MERREM) 500 mg in sodium chloride 0.9 % 100 mL IVPB (mini-bag)  500 mg IntraVENous Q12H Say Marsh MD   Stopped at 08/03/24 1439    hydrocortisone sodium succinate PF (SOLU-CORTEF) injection 100 mg  100 mg IntraVENous Q8H Say Marsh MD   100 mg at 08/03/24 0858    VASOpressin 20 Units in sodium chloride 0.9 % 100 mL infusion  0.01-0.03 Units/min IntraVENous Continuous Say Marsh MD 9 mL/hr at 08/03/24 1508 0.03 Units/min at 08/03/24 1508    norepinephrine (LEVOPHED) 16 mg in sodium chloride 0.9 % 250 mL infusion  1-100 mcg/min IntraVENous Continuous Milagros Hanna APRN - CNP 18.8 mL/hr at 08/03/24 1508 20 mcg/min at 08/03/24 1508    docusate (COLACE) 50 MG/5ML liquid 100 mg  100 mg Oral BID Milagros Hanna APRN - CNP   100 mg at 08/03/24 0901    polyethylene glycol (GLYCOLAX) packet 17 g  17 g Oral Daily Milagros Hanna APRN - CNP   17 g at 08/03/24 0902    perflutren lipid microspheres (DEFINITY) injection 1.5 mL  1.5 mL IntraVENous ONCE PRN Milagros Hanna APRN - CNP        vancomycin (VANCOCIN) intermittent dosing (placeholder)   Other RX Placeholder Milagros Hanna APRN - CNP        lactulose (CHRONULAC) 10 GM/15ML solution 20 g  20 g Oral TID Milagros Hanna APRN - CNP   20 g at 08/03/24 0901    miconazole (MICOTIN) 2 % cream   Topical BID Milagros Hanna APRN - CNP   Given at 08/02/24 2019    prismaSATE BGK 4/2.5 dialysis solution   Dialysis Continuous Abel Larios  mL/hr at 08/01/24 2312 200 mL/hr at 08/01/24 2312    heparin (porcine) injection 1,100-1,900 Units  1,100-1,900 Units  08:02 AM    HCT 29.9 08/03/2024 05:00 AM    MCV 92.3 08/03/2024 05:00 AM    RDW 21.1 08/03/2024 05:00 AM    PLT 95 08/03/2024 05:00 AM     DIFF:    Lab Results   Component Value Date/Time    MCV 92.3 08/03/2024 05:00 AM    RDW 21.1 08/03/2024 05:00 AM      LDH:    Lab Results   Component Value Date/Time     12/26/2020 07:22 AM     Uric Acid:  No components found for: \"URIC\"    EKG Reviewed  Appropriate Radiology Reviewed      Pathology: Reviewed where indicated      ASSESSMENT:  Principal Problem:    Blood loss anemia  Active Problems:    Chronic renal failure    Acute anemia    Cirrhosis of liver with ascites ([facility])    Hemorrhage of arteriovenous fistula ([facility])  Resolved Problems:    * No resolved hospital problems. *    Patient Active Problem List   Diagnosis    Coronary artery disease involving native coronary artery of native heart with angina pectoris ([facility])    Schizophrenia, paranoid, chronic    Metabolic syndrome    Vitamin B 12 deficiency    Cerebral microvascular disease    Mixed hyperlipidemia    Other hammer toe (acquired)    Vitamin D insufficiency    Incontinence of urine    Diabetic nephropathy with proteinuria (HCC)    Essential (primary) hypertension    History of type C viral hepatitis    Urinary incontinence due to cognitive impairment    History of seizures    Stented coronary artery-plan is to stay on Plavix indefinately per Dr Walker    Diabetes mellitus ([facility])    Controlled type 2 diabetes mellitus with diabetic neuropathy, with long-term current use of insulin ([facility])    Hemiparesis, left ([facility])    Angina, class II ([facility])    Pain, unspecified    Tardive dyskinesia    Shortness of breath    Poorly controlled diabetes mellitus ([facility])    Chronic diastolic congestive heart failure ([facility])    [place] (obstructive sleep apnea)    Pulmonary hypertension, unspecified ([facility])    Class 2 severe obesity with serious comorbidity and body mass index (BMI) of 36.0 to 36.9 in adult ([facility])    Edema    Closed  supracondylar fracture of right humerus    Other chronic pain    Palliative care patient    Recurrent falls    Chronic renal failure    Difficulty in walking    ESRD (end stage renal disease) on dialysis ([facility])    Weakness    Other seizures ([facility])    Moderate persistent asthma without complication    COVID-19    Post PTCA    Falls frequently    Chest wall contusion, left, initial encounter    Headache, unspecified    Paranoid schizophrenia ([facility])    Compression fracture of spine (HCC)    Closed rib fracture    Depression    Chronic obstructive pulmonary disease (HCC)    Critical illness polyneuropathy ([facility])    Multiple closed fractures of ribs of right side    Nonrheumatic mitral valve regurgitation    Nonrheumatic tricuspid valve regurgitation    Need for extended care facility    Chronic pain of both shoulders    Gastrointestinal hemorrhage with melena    Hemodialysis-associated hypotension    Anginal chest pain at rest (HCC)    Chest pain    Unstable angina ([facility])    NSTEMI (non-ST elevated myocardial infarction) ([facility])    CKD (chronic kidney disease) stage 4, GFR 15-29 ml/min ([facility])    Hyperkalemia    Impaired mobility and activities of daily living dt polyneuropathy and reccent fall     Dialysis patient (formerly [facility])    Unspecified open wound of right upper arm, initial encounter    Multiple closed fractures of right lower extremity and ribs    Closed T11 fracture ([facility])    Encephalopathy acute    MRSA bacteremia    Sepsis due to Enterococcus (HCC)    Local infection due to central venous catheter    DJD (degenerative joint disease), cervical    Closed head injury    Sarcopenia    Fall from standing    Sepsis due to skin infection (HCC)    Chronic hepatitis C (HCC)    Catheter-related bloodstream infection    Enterococcus faecalis infection    Cervical stenosis of spinal canal    Ataxic gait    Lumbar stenosis with neurogenic claudication    Falls infrequently    Infection of venous access port    Diarrhea    Acute anemia     Eczema    AMS (altered mental status)    Heart failure (HCC)    Interstitial lung disease (HCC)    Cellulitis of left hand    Chronic osteomyelitis of knee (HCC)    Generalized weakness    Shock (HCC)    Fluid overload    Encounter for hospice care discussion    Advanced care planning/counseling discussion    Goals of care, counseling/discussion    Palliative care encounter    Nonhealing nonsurgical wound    Skin picking habit    Hypotension    ESRD on dialysis (HCC)    Acute decompensated heart failure (HCC)    Constipation    Chronic antibiotic suppression    Chronic infection of prosthetic knee (HCC)    Lack of intravenous access    Pressure injury, unstageable, with suspected deep tissue injury (HCC)    Hypogammaglobulinemia (HCC)    Adrenal insufficiency (HCC)    Open wound of left hand without foreign body    Other ascites    Cirrhosis of liver with ascites (HCC)    Thrombocytopenia (HCC)    Blood loss anemia    Hemorrhage of arteriovenous fistula (HCC)       PLAN:  On ferlecet.          Electronically signed by Bc Shipley MD on 8/3/24 at 3:19 PM EDT

## 2024-08-03 NOTE — FLOWSHEET NOTE
Shift summary    0715 report at bedside. RASS -5, pupils 3/nonreactive at this time. No gag, no pain response. + cough with suctioning. Weaning sedation- see MAR for titrations.     0800 Assessment complete see flowsheet. MP SR BP labile on levo and vaso. LS dim with rhonchi, no secretions noted with suctioning. Anasarca noted- weeping on R forearm, 2 fluid filled blisters noted to R thigh.  Arctic sun removed as pt normothermic. Abd rounded/distended/soft. BS hypoactive. Restraints removed at this time.     0905 sedation stopped at this time.     1000 to CT and back without event.     1100 pt daughter in, updated.     1200 Dr. Finch called and spoke with daughter re: plan of care. Pt daughter tearful but appreciative that \"things weren't sugarcoated\". Support given. Had long discussion re: pt and her wishes. No changes to assessment except one thigh blister popped. Drsg to LFA changed.     1400 aly hugger placed as pt temp dropping slightly.     1600 responding to bairhugger well.     1800  I/o's done. Daughter back at bedside- updated. Electronically signed by Margaux Langford RN on 8/3/2024 at 6:38 PM

## 2024-08-03 NOTE — PROGRESS NOTES
Renal Adjustment Per Protocol: Merrem 1000 mg q 12 changed to 500 mg q 12 based on  CRRT currently on hold- to be readdressed 08/04  Recent Labs     08/03/24  0802   CREATININE 3.6*   Estimated Creatinine Clearance: 19 mL/min (A) (based on SCr of 3.6 mg/dL (H)).  .

## 2024-08-03 NOTE — PROGRESS NOTES
CONSULTANT  Virgilio Howell DO      REQUESTING PHYSICIAN  Werner Ortiz MD                REASON FOR CONSULTATION       Chief Complaint   Patient presents with    Hypotension       Picked at HD site and started bleeding, hypotensive when EMS arrived         Hospital Day: 5         Patient is a 64 y.o. female who presents with a chief complaint of hypotension. Patient is followed on a regular basis by Adilene Tom MD. patient with multiple medical problems who presented with hypotension from dialysis center in the setting of acute bleeding from hemodialysis access she is currently on the vent and sedated.  Most of the information was obtained from the staff as well as the chart.  Patient apparently had a PEA arrest during IR intervention.  Had 1 round of CPR and 1 mg of epinephrine given  Patient with history of paroxysmal atrial fibrillation.  Had brief episode of A-fib however currently she is in normal sinus rhythm on telemetry    8-2-24: Remains on vent and sedated.  On Levophed and about to initiate vasopressin.  Patient on CRRT.  Also on normothermia protocol.  Appears to be in A-fib on telemetry.  Status post echocardiogram yesterday with ejection fraction of 55 to 60%    8/3/24 covering Dr. Howell.  Increased WBC. Tele SR. On both Levo and Vaso. Reamins vented on 50% FIO2.      Past Medical History        Past Medical History:   Diagnosis Date    Angina at rest ([facility]) 5/24/2020    Anxiety      CAD S/P percutaneous coronary angioplasty 2015, 2018     stents per Huong Sanabria    CHF (congestive heart failure) ([facility])      CKD (chronic kidney disease) stage 4, GFR 15-29 ml/min ([facility]) 2/24/2018    CKD stage 4 due to type 2 diabetes mellitus ([facility])      Contusion of right chest wall 2/16/2021    COPD (chronic obstructive pulmonary disease) ([facility])      Diabetic nephropathy with proteinuria ([facility]) 2014    DJD (degenerative joint disease) of knee       Dr Sharma    GERD (gastroesophageal reflux  vein.  Arterial anastomosis appeared widely patent.      Thrombosed fistula with complete occlusion at the venous anastomosis.  Due to deterioration patient's condition the procedure was aborted and not completed.      Echo (TTE) complete (PRN contrast/bubble/strain/3D)     Result Date: 8/1/2024    Left Ventricle: Normal left ventricular systolic function with a visually estimated EF of 55 - 60%. Left ventricle size is normal. Normal wall thickness. Normal wall motion. Diastolic dysfunction present with increased LAP with normal LV EF.   Aortic Valve: Moderately thickened cusps. Moderate sclerosis of the aortic valve cusps. Mild regurgitation with a centrally directed jet.   Mitral Valve: Moderately thickened leaflets. Mild regurgitation with a centrally directed jet. Mild stenosis noted.   Tricuspid Valve: Mild regurgitation with a centrally directed jet. Mildly elevated RVSP, consistent with mild pulmonary hypertension. The estimated RVSP is 42 mmHg.   Left Atrium: Left atrium is severely dilated.   Image quality is adequate. Technically difficult study due to patient's body habitus and procedure performed with the patient in a supine position.      XR CHEST PORTABLE     Result Date: 7/31/2024 JULY 30TH EXAMINATION: ONE XRAY VIEW OF THE CHEST 7/31/2024 2:28 pm COMPARISON: None. HISTORY: ORDERING SYSTEM PROVIDED HISTORY: post intubation TECHNOLOGIST PROVIDED HISTORY: Reason for exam:->post intubation What reading provider will be dictating this exam?->CRC FINDINGS: CT 24.8 cm above the tabitha.  Left central venous catheter tip in the expected location of the left brachiocephalic vein.  A right the cardiac silhouette is enlarged.  Increased central interstitial and pulmonary venous markings are observed      1. Endotracheal tube 24.8 cm above the tabitha. 2. Left central venous catheter tip in the expected location of the left brachiocephalic vein. 3. Cardiomegaly with pulmonary vascular congestion.      CT HEAD WO  of clear yellow fluid were aspirated and sent for cytology, and pathology.  The needle was removed, and hemostasis was obtained with pressure.  A Band-Aid was placed. Post procedure images did not demonstrate hemorrhage at the target site. The patient tolerated the procedure well. The patient left the department in good condition. A radiology nurse was in presence monitoring vital signs, assisting throughout the procedure. Electronically signed by Eze White        EKG: normal sinus rhythm, RBBB        2D Echo:      07/27/24     ECHO (TTE) COMPLETE (PRN CONTRAST/BUBBLE/STRAIN/3D) 08/01/2024  8:14 AM (Final)     Interpretation Summary    Left Ventricle: Normal left ventricular systolic function with a visually estimated EF of 55 - 60%. Left ventricle size is normal. Normal wall thickness. Normal wall motion. Diastolic dysfunction present with increased LAP with normal LV EF.    Aortic Valve: Moderately thickened cusps. Moderate sclerosis of the aortic valve cusps. Mild regurgitation with a centrally directed jet.    Mitral Valve: Moderately thickened leaflets. Mild regurgitation with a centrally directed jet. Mild stenosis noted.    Tricuspid Valve: Mild regurgitation with a centrally directed jet. Mildly elevated RVSP, consistent with mild pulmonary hypertension. The estimated RVSP is 42 mmHg.    Left Atrium: Left atrium is severely dilated.    Image quality is adequate. Technically difficult study due to patient's body habitus and procedure performed with the patient in a supine position.     Signed by: Morteza WOODS MD on 8/1/2024  8:14 AM        Stress Test:      No results found for this or any previous visit.  Echo (TTE) complete 07/31/2024     Interpretation Summary    Left Ventricle: Normal left ventricular systolic function with a visually estimated EF of 55 - 60%. Left ventricle size is normal. Normal wall thickness. Normal wall motion. Diastolic dysfunction present with increased LAP with normal LV

## 2024-08-03 NOTE — PLAN OF CARE
Problem: Discharge Planning  Goal: Discharge to home or other facility with appropriate resources  Outcome: Progressing     Problem: Skin/Tissue Integrity  Goal: Absence of new skin breakdown  Description: 1.  Monitor for areas of redness and/or skin breakdown  2.  Assess vascular access sites hourly  3.  Every 4-6 hours minimum:  Change oxygen saturation probe site  4.  Every 4-6 hours:  If on nasal continuous positive airway pressure, respiratory therapy assess nares and determine need for appliance change or resting period.  Outcome: Progressing     Problem: Safety - Adult  Goal: Free from fall injury  Outcome: Progressing     Problem: ABCDS Injury Assessment  Goal: Absence of physical injury  Outcome: Progressing     Problem: Pain  Goal: Verbalizes/displays adequate comfort level or baseline comfort level  Outcome: Progressing     Problem: Chronic Conditions and Co-morbidities  Goal: Patient's chronic conditions and co-morbidity symptoms are monitored and maintained or improved  Outcome: Progressing     Problem: Safety - Medical Restraint  Goal: Remains free of injury from restraints (Restraint for Interference with Medical Device)  Description: INTERVENTIONS:  1. Determine that other, less restrictive measures have been tried or would not be effective before applying the restraint  2. Evaluate the patient's condition at the time of restraint application  3. Inform patient/family regarding the reason for restraint  4. Q2H: Monitor safety, psychosocial status, comfort, nutrition and hydration  Outcome: Progressing     Problem: Neurosensory - Adult  Goal: Achieves stable or improved neurological status  Outcome: Progressing     Problem: Respiratory - Adult  Goal: Achieves optimal ventilation and oxygenation  Outcome: Progressing     Problem: Cardiovascular - Adult  Goal: Maintains optimal cardiac output and hemodynamic stability  Outcome: Progressing  Goal: Absence of cardiac dysrhythmias or at  baseline  Outcome: Progressing     Problem: Skin/Tissue Integrity - Adult  Goal: Skin integrity remains intact  Outcome: Progressing     Problem: Musculoskeletal - Adult  Goal: Return mobility to safest level of function  Outcome: Progressing     Problem: Gastrointestinal - Adult  Goal: Minimal or absence of nausea and vomiting  Outcome: Progressing  Goal: Maintains or returns to baseline bowel function  Outcome: Progressing  Goal: Maintains adequate nutritional intake  Outcome: Progressing     Problem: Genitourinary - Adult  Goal: Absence of urinary retention  Outcome: Progressing     Problem: Infection - Adult  Goal: Absence of infection at discharge  Outcome: Progressing     Problem: Metabolic/Fluid and Electrolytes - Adult  Goal: Electrolytes maintained within normal limits  Outcome: Progressing  Goal: Hemodynamic stability and optimal renal function maintained  Outcome: Progressing  Goal: Glucose maintained within prescribed range  Outcome: Progressing     Problem: Hematologic - Adult  Goal: Maintains hematologic stability  Outcome: Progressing

## 2024-08-04 ENCOUNTER — APPOINTMENT (OUTPATIENT)
Dept: CT IMAGING | Age: 64
End: 2024-08-04
Payer: MEDICARE

## 2024-08-04 LAB
ALBUMIN SERPL-MCNC: 3 G/DL (ref 3.5–4.6)
ANION GAP SERPL CALCULATED.3IONS-SCNC: 16 MEQ/L (ref 9–15)
BACTERIA FLD AEROBE CULT: NORMAL
BASOPHILS # BLD: 0 K/UL (ref 0–0.2)
BASOPHILS NFR BLD: 0.2 %
BUN SERPL-MCNC: 35 MG/DL (ref 8–23)
CALCIUM SERPL-MCNC: 9.3 MG/DL (ref 8.5–9.9)
CHLORIDE SERPL-SCNC: 99 MEQ/L (ref 95–107)
CO2 SERPL-SCNC: 18 MEQ/L (ref 20–31)
CREAT SERPL-MCNC: 4.28 MG/DL (ref 0.5–0.9)
CRP SERPL HS-MCNC: 315.9 MG/L (ref 0–5)
EOSINOPHIL # BLD: 0 K/UL (ref 0–0.7)
EOSINOPHIL NFR BLD: 0 %
ERYTHROCYTE [DISTWIDTH] IN BLOOD BY AUTOMATED COUNT: 21.5 % (ref 11.5–14.5)
GLUCOSE BLD-MCNC: 183 MG/DL (ref 70–99)
GLUCOSE BLD-MCNC: 190 MG/DL (ref 70–99)
GLUCOSE BLD-MCNC: 204 MG/DL (ref 70–99)
GLUCOSE BLD-MCNC: 210 MG/DL (ref 70–99)
GLUCOSE SERPL-MCNC: 193 MG/DL (ref 70–99)
HAPTOGLOB SERPL-MCNC: 45 MG/DL (ref 30–200)
HCT VFR BLD AUTO: 26.7 % (ref 37–47)
HGB BLD-MCNC: 9 G/DL (ref 12–16)
LYMPHOCYTES # BLD: 0.8 K/UL (ref 1–4.8)
LYMPHOCYTES NFR BLD: 3.2 %
MCH RBC QN AUTO: 29.8 PG (ref 27–31.3)
MCHC RBC AUTO-ENTMCNC: 33.7 % (ref 33–37)
MCV RBC AUTO: 88.4 FL (ref 79.4–94.8)
MONOCYTES # BLD: 0.7 K/UL (ref 0.2–0.8)
MONOCYTES NFR BLD: 3.1 %
NEUTROPHILS # BLD: 21.4 K/UL (ref 1.4–6.5)
NEUTS SEG NFR BLD: 92.6 %
NUCLEAR IGG SER QL IA: DETECTED
PERFORMED ON: ABNORMAL
PHOSPHATE SERPL-MCNC: 6.6 MG/DL (ref 2.3–4.8)
PLATELET # BLD AUTO: 100 K/UL (ref 130–400)
POTASSIUM SERPL-SCNC: 5.3 MEQ/L (ref 3.4–4.9)
PROCALCITONIN SERPL IA-MCNC: 6.97 NG/ML (ref 0–0.15)
RBC # BLD AUTO: 3.02 M/UL (ref 4.2–5.4)
SODIUM SERPL-SCNC: 133 MEQ/L (ref 135–144)
WBC # BLD AUTO: 23.1 K/UL (ref 4.8–10.8)

## 2024-08-04 PROCEDURE — 6360000002 HC RX W HCPCS: Performed by: INTERNAL MEDICINE

## 2024-08-04 PROCEDURE — 84145 PROCALCITONIN (PCT): CPT

## 2024-08-04 PROCEDURE — 6360000002 HC RX W HCPCS: Performed by: NURSE PRACTITIONER

## 2024-08-04 PROCEDURE — 70450 CT HEAD/BRAIN W/O DYE: CPT

## 2024-08-04 PROCEDURE — 6370000000 HC RX 637 (ALT 250 FOR IP): Performed by: INTERNAL MEDICINE

## 2024-08-04 PROCEDURE — 2700000000 HC OXYGEN THERAPY PER DAY

## 2024-08-04 PROCEDURE — 85025 COMPLETE CBC W/AUTO DIFF WBC: CPT

## 2024-08-04 PROCEDURE — 2580000003 HC RX 258: Performed by: COLON & RECTAL SURGERY

## 2024-08-04 PROCEDURE — 99291 CRITICAL CARE FIRST HOUR: CPT | Performed by: INTERNAL MEDICINE

## 2024-08-04 PROCEDURE — 2580000003 HC RX 258: Performed by: ANESTHESIOLOGY

## 2024-08-04 PROCEDURE — 3E03317 INTRODUCTION OF OTHER THROMBOLYTIC INTO PERIPHERAL VEIN, PERCUTANEOUS APPROACH: ICD-10-PCS | Performed by: STUDENT IN AN ORGANIZED HEALTH CARE EDUCATION/TRAINING PROGRAM

## 2024-08-04 PROCEDURE — 2000000000 HC ICU R&B

## 2024-08-04 PROCEDURE — 2580000003 HC RX 258: Performed by: INTERNAL MEDICINE

## 2024-08-04 PROCEDURE — 80069 RENAL FUNCTION PANEL: CPT

## 2024-08-04 PROCEDURE — 86140 C-REACTIVE PROTEIN: CPT

## 2024-08-04 PROCEDURE — 6370000000 HC RX 637 (ALT 250 FOR IP): Performed by: COLON & RECTAL SURGERY

## 2024-08-04 PROCEDURE — 2580000003 HC RX 258: Performed by: NURSE PRACTITIONER

## 2024-08-04 PROCEDURE — 90945 DIALYSIS ONE EVALUATION: CPT

## 2024-08-04 PROCEDURE — 2500000003 HC RX 250 WO HCPCS: Performed by: NURSE PRACTITIONER

## 2024-08-04 PROCEDURE — 37799 UNLISTED PX VASCULAR SURGERY: CPT

## 2024-08-04 PROCEDURE — 99223 1ST HOSP IP/OBS HIGH 75: CPT | Performed by: PSYCHIATRY & NEUROLOGY

## 2024-08-04 PROCEDURE — 36556 INSERT NON-TUNNEL CV CATH: CPT

## 2024-08-04 PROCEDURE — 99233 SBSQ HOSP IP/OBS HIGH 50: CPT | Performed by: INTERNAL MEDICINE

## 2024-08-04 PROCEDURE — 94003 VENT MGMT INPAT SUBQ DAY: CPT

## 2024-08-04 PROCEDURE — 6370000000 HC RX 637 (ALT 250 FOR IP): Performed by: NURSE PRACTITIONER

## 2024-08-04 RX ORDER — SODIUM CHLORIDE 0.9 % (FLUSH) 0.9 %
1.1-1.9 SYRINGE (ML) INJECTION PRN
Status: DISCONTINUED | OUTPATIENT
Start: 2024-08-04 | End: 2024-08-08 | Stop reason: HOSPADM

## 2024-08-04 RX ORDER — HEPARIN SODIUM 1000 [USP'U]/ML
2500 INJECTION, SOLUTION INTRAVENOUS; SUBCUTANEOUS PRN
Status: DISCONTINUED | OUTPATIENT
Start: 2024-08-04 | End: 2024-08-08 | Stop reason: HOSPADM

## 2024-08-04 RX ADMIN — Medication 1000 ML/HR: at 14:12

## 2024-08-04 RX ADMIN — INSULIN GLARGINE 35 UNITS: 100 INJECTION, SOLUTION SUBCUTANEOUS at 21:21

## 2024-08-04 RX ADMIN — MEROPENEM 500 MG: 500 INJECTION, POWDER, FOR SOLUTION INTRAVENOUS at 23:52

## 2024-08-04 RX ADMIN — INSULIN LISPRO 4 UNITS: 100 INJECTION, SOLUTION INTRAVENOUS; SUBCUTANEOUS at 12:03

## 2024-08-04 RX ADMIN — POLYETHYLENE GLYCOL 3350 17 G: 17 POWDER, FOR SOLUTION ORAL at 07:30

## 2024-08-04 RX ADMIN — VASOPRESSIN 0.03 UNITS/MIN: 20 INJECTION INTRAVENOUS at 17:04

## 2024-08-04 RX ADMIN — CHLORHEXIDINE GLUCONATE 15 ML: 1.2 RINSE ORAL at 07:48

## 2024-08-04 RX ADMIN — NEPHROCAP 1 MG: 1 CAP ORAL at 07:30

## 2024-08-04 RX ADMIN — FLUDROCORTISONE ACETATE 0.1 MG: 0.1 TABLET ORAL at 07:33

## 2024-08-04 RX ADMIN — LACTULOSE 20 G: 20 SOLUTION ORAL at 14:35

## 2024-08-04 RX ADMIN — HYDROCORTISONE SODIUM SUCCINATE 100 MG: 100 INJECTION, POWDER, FOR SOLUTION INTRAMUSCULAR; INTRAVENOUS at 00:13

## 2024-08-04 RX ADMIN — MICONAZOLE NITRATE: 2 POWDER TOPICAL at 21:22

## 2024-08-04 RX ADMIN — Medication 2000 UNITS: at 07:30

## 2024-08-04 RX ADMIN — Medication 22 MCG/MIN: at 00:09

## 2024-08-04 RX ADMIN — VANCOMYCIN HYDROCHLORIDE 750 MG: 750 INJECTION, POWDER, LYOPHILIZED, FOR SOLUTION INTRAVENOUS at 15:35

## 2024-08-04 RX ADMIN — CHLORHEXIDINE GLUCONATE 15 ML: 1.2 RINSE ORAL at 21:21

## 2024-08-04 RX ADMIN — Medication 10 ML: at 07:31

## 2024-08-04 RX ADMIN — MIDODRINE HYDROCHLORIDE 20 MG: 10 TABLET ORAL at 07:30

## 2024-08-04 RX ADMIN — LACTULOSE 20 G: 20 SOLUTION ORAL at 07:30

## 2024-08-04 RX ADMIN — Medication 10 ML: at 21:00

## 2024-08-04 RX ADMIN — Medication 7 MCG/MIN: at 23:50

## 2024-08-04 RX ADMIN — MIDODRINE HYDROCHLORIDE 20 MG: 10 TABLET ORAL at 11:55

## 2024-08-04 RX ADMIN — HEPARIN SODIUM 2500 UNITS: 1000 INJECTION INTRAVENOUS; SUBCUTANEOUS at 16:55

## 2024-08-04 RX ADMIN — Medication 10 ML: at 21:20

## 2024-08-04 RX ADMIN — MEROPENEM 500 MG: 500 INJECTION, POWDER, FOR SOLUTION INTRAVENOUS at 11:17

## 2024-08-04 RX ADMIN — HYDROCORTISONE SODIUM SUCCINATE 100 MG: 100 INJECTION, POWDER, FOR SOLUTION INTRAMUSCULAR; INTRAVENOUS at 16:59

## 2024-08-04 RX ADMIN — Medication 1000 ML/HR: at 14:11

## 2024-08-04 RX ADMIN — LACTULOSE 20 G: 20 SOLUTION ORAL at 21:21

## 2024-08-04 RX ADMIN — MICONAZOLE NITRATE: 2 POWDER TOPICAL at 07:45

## 2024-08-04 RX ADMIN — FLUDROCORTISONE ACETATE 0.1 MG: 0.1 TABLET ORAL at 21:23

## 2024-08-04 RX ADMIN — DOCUSATE SODIUM 100 MG: 50 LIQUID ORAL at 07:30

## 2024-08-04 RX ADMIN — MIDODRINE HYDROCHLORIDE 20 MG: 10 TABLET ORAL at 16:59

## 2024-08-04 RX ADMIN — SODIUM CHLORIDE, PRESERVATIVE FREE 40 MG: 5 INJECTION INTRAVENOUS at 07:31

## 2024-08-04 RX ADMIN — VASOPRESSIN 0.03 UNITS/MIN: 20 INJECTION INTRAVENOUS at 04:59

## 2024-08-04 RX ADMIN — MICONAZOLE NITRATE: 20 CREAM TOPICAL at 07:45

## 2024-08-04 RX ADMIN — MEROPENEM 500 MG: 500 INJECTION, POWDER, FOR SOLUTION INTRAVENOUS at 00:05

## 2024-08-04 RX ADMIN — ALTEPLASE 4 MG: 2.2 INJECTION, POWDER, LYOPHILIZED, FOR SOLUTION INTRAVENOUS at 12:07

## 2024-08-04 RX ADMIN — INSULIN LISPRO 4 UNITS: 100 INJECTION, SOLUTION INTRAVENOUS; SUBCUTANEOUS at 00:12

## 2024-08-04 RX ADMIN — MICONAZOLE NITRATE: 20 CREAM TOPICAL at 21:00

## 2024-08-04 RX ADMIN — HYDROCORTISONE SODIUM SUCCINATE 100 MG: 100 INJECTION, POWDER, FOR SOLUTION INTRAMUSCULAR; INTRAVENOUS at 09:56

## 2024-08-04 ASSESSMENT — PULMONARY FUNCTION TESTS
PIF_VALUE: 28
PIF_VALUE: 29
PIF_VALUE: 28
PIF_VALUE: 29
PIF_VALUE: 28
PIF_VALUE: 29
PIF_VALUE: 29
PIF_VALUE: 28
PIF_VALUE: 28
PIF_VALUE: 29
PIF_VALUE: 28
PIF_VALUE: 27
PIF_VALUE: 28
PIF_VALUE: 28
PIF_VALUE: 29
PIF_VALUE: 28

## 2024-08-04 ASSESSMENT — PAIN SCALES - GENERAL
PAINLEVEL_OUTOF10: 0
PAINLEVEL_OUTOF10: 0

## 2024-08-04 NOTE — PROGRESS NOTES
Hematology/Oncology   Progress Note        CHIEF COMPLAINT/HPI:  Follow up of anemia in CRD. Still on  a vent. Hemoglobin at 9. Had previous ferlecet.     REVIEW OF SYSTEMS:    Unremarkable except for symptoms mentioned in HPI.    Current Inpatient Medications:    Current Facility-Administered Medications   Medication Dose Route Frequency Provider Last Rate Last Admin    prismaSATE BGK 4/2.5 dialysis solution   Dialysis Continuous BesJaden mobley, DO 1,000 mL/hr at 08/04/24 1412 1,000 mL/hr at 08/04/24 1412    prismaSATE BGK 4/2.5 dialysis solution   Dialysis Continuous BesJaden mobley, DO 1,000 mL/hr at 08/04/24 1412 1,000 mL/hr at 08/04/24 1412    prismaSATE BGK 4/2.5 dialysis solution   Dialysis Continuous Jaden Finch, DO 1,000 mL/hr at 08/04/24 1411 1,000 mL/hr at 08/04/24 1411    sodium chloride flush 0.9 % injection 1.1-1.9 mL  1.1-1.9 mL IntraCATHeter PRN Jaden Finch DO        And    sodium chloride flush 0.9 % injection 1.1-1.9 mL  1.1-1.9 mL IntraCATHeter PRN Jaden Finch, DO        meropenem (MERREM) 500 mg in sodium chloride 0.9 % 100 mL IVPB (mini-bag)  500 mg IntraVENous Q12H Say Marsh MD        vancomycin (VANCOCIN) 750 mg in sodium chloride 0.9 % 250 mL IVPB  750 mg IntraVENous Once Milagros Hanna APRN - CNP        hydrocortisone sodium succinate PF (SOLU-CORTEF) injection 100 mg  100 mg IntraVENous Q8H Say Marsh MD   100 mg at 08/04/24 0956    VASOpressin 20 Units in sodium chloride 0.9 % 100 mL infusion  0.01-0.03 Units/min IntraVENous Continuous Say Marsh MD 9 mL/hr at 08/04/24 1229 0.03 Units/min at 08/04/24 1229    norepinephrine (LEVOPHED) 16 mg in sodium chloride 0.9 % 250 mL infusion  1-100 mcg/min IntraVENous Continuous Milagros Hanna APRN - CNP 11.3 mL/hr at 08/04/24 1229 12 mcg/min at 08/04/24 1229    docusate (COLACE) 50 MG/5ML liquid 100 mg  100 mg Oral BID Milagros Hanna APRN - CNP   100 mg at 08/04/24 0730    polyethylene glycol (GLYCOLAX)  pain of both shoulders    Gastrointestinal hemorrhage with melena    Hemodialysis-associated hypotension    Anginal chest pain at rest (HCC)    Chest pain    Unstable angina (HCC)    NSTEMI (non-ST elevated myocardial infarction) ([facility])    CKD (chronic kidney disease) stage 4, GFR 15-29 ml/min (HCC)    Hyperkalemia    Impaired mobility and activities of daily living dt polyneuropathy and reccent fall     Dialysis patient ([facility])    Unspecified open wound of right upper arm, initial encounter    Multiple closed fractures of right lower extremity and ribs    Closed T11 fracture ([facility])    Encephalopathy acute    MRSA bacteremia    Sepsis due to Enterococcus (HCC)    Local infection due to central venous catheter    DJD (degenerative joint disease), cervical    Closed head injury    Sarcopenia    Fall from standing    Sepsis due to skin infection (HCC)    Chronic hepatitis C (HCC)    Catheter-related bloodstream infection    Enterococcus faecalis infection    Cervical stenosis of spinal canal    Ataxic gait    Lumbar stenosis with neurogenic claudication    Falls infrequently    Infection of venous access port    Diarrhea    Acute anemia    Eczema    AMS (altered mental status)    Heart failure (HCC)    Interstitial lung disease (HCC)    Cellulitis of left hand    Chronic osteomyelitis of knee (HCC)    Generalized weakness    Shock ([facility])    Fluid overload    Encounter for hospice care discussion    Advanced care planning/counseling discussion    Goals of care, counseling/discussion    Palliative care encounter    Nonhealing nonsurgical wound    Skin picking habit    Hypotension    ESRD on dialysis (HCC)    Acute decompensated heart failure (HCC)    Constipation    Chronic antibiotic suppression    Chronic infection of prosthetic knee (HCC)    Lack of intravenous access    Pressure injury, unstageable, with suspected deep tissue injury (HCC)    Hypogammaglobulinemia (HCC)    Adrenal insufficiency (HCC)    Open wound of left

## 2024-08-04 NOTE — PROGRESS NOTES
Nephrology Progress Note    Assessment:  ESRDX  Hypotension on levo  CAD      Plan:discussed with daughter yesterday poor prognosis  Will do a trial CRRT  on levo  patient chronicaly hypotensive    Patient Active Problem List:     Coronary artery disease involving native coronary artery of native heart with angina pectoris ([facility])     Schizophrenia, paranoid, chronic     Metabolic syndrome     Vitamin B 12 deficiency     Cerebral microvascular disease     Mixed hyperlipidemia     Other hammer toe (acquired)     Vitamin D insufficiency     Incontinence of urine     Diabetic nephropathy with proteinuria (HCC)     Essential (primary) hypertension     History of type C viral hepatitis     Urinary incontinence due to cognitive impairment     History of seizures     Stented coronary artery-plan is to stay on Plavix indefinately per Dr Walker     Diabetes mellitus ([facility])     Controlled type 2 diabetes mellitus with diabetic neuropathy, with long-term current use of insulin ([facility])     Hemiparesis, left ([facility])     Angina, class II ([facility])     Pain, unspecified     Tardive dyskinesia     Shortness of breath     Poorly controlled diabetes mellitus ([facility])     Chronic diastolic congestive heart failure ([facility])     [place] (obstructive sleep apnea)     Pulmonary hypertension, unspecified ([facility])     Class 2 severe obesity with serious comorbidity and body mass index (BMI) of 36.0 to 36.9 in adult ([facility])     Edema     Closed supracondylar fracture of right humerus     Other chronic pain     Palliative care patient     Recurrent falls     Chronic renal failure     Difficulty in walking     ESRD (end stage renal disease) on dialysis ([facility])     Weakness     Other seizures ([facility])     Moderate persistent asthma without complication     COVID-19     Post PTCA     Falls frequently     Chest wall contusion, left, initial encounter     Headache, unspecified     Paranoid schizophrenia ([facility])     Compression fracture of spine ([facility])     Closed rib fracture      20 Units in sodium chloride 0.9 % 100 mL infusion 0.03 Units/min (08/04/24 0711)    norepinephrine 14 mcg/min (08/04/24 0711)    prismaSATE BGK 4/2.5 200 mL/hr (08/01/24 2312)    midazolam 1 mg/hr (08/03/24 0248)    fentaNYL Stopped (08/03/24 0905)    propofol Stopped (08/03/24 0903)    prismaSATE BGK 4/2.5 1,000 mL/hr at 08/02/24 0952    prismaSATE BGK 4/2.5 1,000 mL/hr at 08/02/24 0950    sodium chloride      sodium chloride      sodium chloride      dextrose         CBC:   Recent Labs     08/03/24  0500 08/03/24  0802 08/04/24  0552   WBC 33.2*  --  23.1*   HGB 10.5* 9.8* 9.0*   PLT 95*  --  100*     CMP:    Recent Labs     08/02/24  0459 08/03/24  0443 08/03/24  0500 08/03/24  0802 08/04/24  0553   *  --  128*  --  133*   K 3.6  --  5.3*  --  5.3*   CL 99  --  93*  --  99   CO2 21  --  17*  --  18*   BUN 20  --  21  --  35*   CREATININE 3.27*   < > 3.39* 3.6* 4.28*   GLUCOSE 110*  --  190*  --  193*   CALCIUM 9.1  --  9.7  --  9.3   LABGLOM 14.9*   < > 14.3* 13* 10.8*    < > = values in this interval not displayed.     Troponin: No results for input(s): \"TROPONINI\" in the last 72 hours.  BNP: No results for input(s): \"BNP\" in the last 72 hours.  INR:   Recent Labs     08/02/24  1202   INR 1.6     Lipids:   Recent Labs     08/03/24  1815   TRIG 410*     Liver:   Recent Labs     08/02/24  1202   AST NOT DONE   ALT NOT DONE   ALKPHOS 108   BILITOT 2.4*     Iron:  No results for input(s): \"FERRITIN\" in the last 72 hours.    Invalid input(s): \"IRONS\", \"LABIRONS\"  Urinalysis: No results for input(s): \"UA\" in the last 72 hours.    Objective:  Vitals: BP (!) 107/49   Pulse 75   Temp 98.2 °F (36.8 °C)   Resp 30   Ht 1.727 m (5' 8\")   Wt 96.8 kg (213 lb 8 oz)   LMP  (LMP Unknown)   SpO2 99%   BMI 32.46 kg/m²    Wt Readings from Last 3 Encounters:   08/01/24 96.8 kg (213 lb 8 oz)   07/24/24 98.4 kg (217 lb)   07/15/24 98.4 kg (217 lb)      24HR INTAKE/OUTPUT:    Intake/Output Summary (Last 24 hours) at

## 2024-08-04 NOTE — FLOWSHEET NOTE
CVVHDF started without difficult.  Cathflo dwelled for 2 hrs and both lines sluggish after dwell.  Lines reversed. Bescak aware.

## 2024-08-04 NOTE — PLAN OF CARE
Problem: Discharge Planning  Goal: Discharge to home or other facility with appropriate resources  Outcome: Not Progressing TBD d/t neuro status change     Problem: Skin/Tissue Integrity  Goal: Absence of new skin breakdown  Description: 1.  Monitor for areas of redness and/or skin breakdown  2.  Assess vascular access sites hourly  3.  Every 4-6 hours minimum:  Change oxygen saturation probe site  4.  Every 4-6 hours:  If on nasal continuous positive airway pressure, respiratory therapy assess nares and determine need for appliance change or resting period.  Outcome: Not Progressing-weeping due to anasarca     Problem: Neurosensory - Adult  Goal: Achieves stable or improved neurological status  Outcome: Not Progressing new change GCS 3 RASS -5 off sedation >30 hours with no change. EEG in AM     Problem: Skin/Tissue Integrity - Adult  Goal: Skin integrity remains intact  Outcome: Not Progressing- weeping and blisters due to anasarca     Problem: Musculoskeletal - Adult  Goal: Return mobility to safest level of function  Outcome: Not Progressing GCS 3 no pain response extremities flaccid.      Problem: Gastrointestinal - Adult  Goal: Minimal or absence of nausea and vomiting  Outcome: Not Progressing   Goal: Maintains or returns to baseline bowel function  Outcome: Not Progressing NO BM since 7/29 despite meds.  Goal: Maintains adequate nutritional intake  Outcome: Not Progressing remains NPO with NG to LIWS d/t high residuals.      Problem: Metabolic/Fluid and Electrolytes - Adult  Goal: Electrolytes maintained within normal limits  Outcome: Not Progressing not on HD, needs new catheter placed, anasarca noted with weeping.

## 2024-08-04 NOTE — FLOWSHEET NOTE
Shift summary    0715 update received.     [id number] assessment complete see flowsheet. No gag, + weak cough with suctioning, no pain response noted- pupils 3/ nonreactive. No corneal reflex. Extremities flaccid.     Generalized anasarca/pititng edema noted. Weeping to R arm. MP SR. Skin jaundiced/ecchymotic.     LS dim with faint upper rhonchi- suctioned thick clear secretions from mouth.     Bs hypoactive. Abd rounded. OG to LIWS. TF remains on hold. Last BM 7/29.     Skin- drsg to L arm CDI. R thigh blister- one intact, one popped. Scattered bruising and scabs.     L arm AVF- drsg changed -2 stitches noted intact-  old drainage noted. No thrill noted.     To CT and back without event.     Spoke with daughter this AM, updated. Electronically signed by Margaux Langford RN on 8/4/2024 at 9:46 AM      I did speak with Dr. Guadarrama re: CT scan. Plan for EEG in AM.     1130 HD FLAQUITO Reaves here, updated. When she went to start CRRT- no blood flow noted in either lumen of temporary HD cath. Order received for cathflow. Per Dr. GOMES- if cathflow unsuccessful- will hold off on CRRT today and place permacath tomorrow.     1145 pt daughter here, updated. Pt tearful, emotional support given. Long discussion re: pt wishes and options going forward. Pt daughter stated \"maybe this is God telling me something\" re: HD cath not working.     1207 cathflow instilled. Per HD FLAQUITO Reaves- will be back in 2 hours to reassess. No changes to assessment.     1225 call to [facility] as pt off of sedation > 25 hours and no change in neuro status. Spoke with Meron, referral initiated. Electronically signed by Margaux Langford RN on 8/4/2024 at 12:26 PM    1240 received call from Samanta from [employer]- updated. Will be on site this afternoon. Electronically signed by Margaux Langford RN on 8/4/2024 at 12:49 PM    1340 received call from "[employer]- pt is not eligible as donor. To call with cardiac TOD if applicable. Electronically signed by Margaux KEY  FLAQUITO Langford on 8/4/2024 at 1:53 PM    1407 CRRT restarted. Electronically signed by Margaux Langford RN on 8/4/2024 at 2:21 PM    1430 blood returned- line not functioning- giving high access pressures despite lines being switched and repositioning patient numerous ways.     1522 trial off of vaso- pt BP dipped significantly. Restarted.     1600 no change to assessment. Electronically signed by Margaux Langford RN on 8/4/2024 at 4:05 PM    1618 Spoke with Dr. LARON SKINNER cath to remain in groin even though non-functional, in hopes it can be exchanged for a permacath. Drsg changed and site heparinized. Electronically signed by Margaux Langford RN on 8/4/2024 at 5:21 PM    1745 pt daughter here, updated. Electronically signed by Margaux Langford RN on 8/4/2024 at 5:57 PM

## 2024-08-04 NOTE — PLAN OF CARE
Problem: Discharge Planning  Goal: Discharge to home or other facility with appropriate resources  Outcome: Not Progressing     Problem: Skin/Tissue Integrity  Goal: Absence of new skin breakdown  Description: 1.  Monitor for areas of redness and/or skin breakdown  2.  Assess vascular access sites hourly  3.  Every 4-6 hours minimum:  Change oxygen saturation probe site  4.  Every 4-6 hours:  If on nasal continuous positive airway pressure, respiratory therapy assess nares and determine need for appliance change or resting period.  Outcome: Not Progressing     Problem: Safety - Adult  Goal: Free from fall injury  Outcome: Not Progressing     Problem: ABCDS Injury Assessment  Goal: Absence of physical injury  Outcome: Not Progressing     Problem: Pain  Goal: Verbalizes/displays adequate comfort level or baseline comfort level  Outcome: Not Progressing  Flowsheets  Taken 8/4/2024 0000  Verbalizes/displays adequate comfort level or baseline comfort level:   Assess pain using appropriate pain scale   Administer analgesics based on type and severity of pain and evaluate response  Taken 8/3/2024 2000  Verbalizes/displays adequate comfort level or baseline comfort level:   Assess pain using appropriate pain scale   Administer analgesics based on type and severity of pain and evaluate response     Problem: Chronic Conditions and Co-morbidities  Goal: Patient's chronic conditions and co-morbidity symptoms are monitored and maintained or improved  Outcome: Not Progressing     Problem: Safety - Medical Restraint  Goal: Remains free of injury from restraints (Restraint for Interference with Medical Device)  Description: INTERVENTIONS:  1. Determine that other, less restrictive measures have been tried or would not be effective before applying the restraint  2. Evaluate the patient's condition at the time of restraint application  3. Inform patient/family regarding the reason for restraint  4. Q2H: Monitor safety, psychosocial  status, comfort, nutrition and hydration  Outcome: Not Progressing     Problem: Neurosensory - Adult  Goal: Achieves stable or improved neurological status  Outcome: Not Progressing     Problem: Respiratory - Adult  Goal: Achieves optimal ventilation and oxygenation  Outcome: Not Progressing     Problem: Cardiovascular - Adult  Goal: Maintains optimal cardiac output and hemodynamic stability  Outcome: Not Progressing  Goal: Absence of cardiac dysrhythmias or at baseline  Outcome: Not Progressing     Problem: Skin/Tissue Integrity - Adult  Goal: Skin integrity remains intact  Outcome: Not Progressing     Problem: Musculoskeletal - Adult  Goal: Return mobility to safest level of function  Outcome: Not Progressing     Problem: Gastrointestinal - Adult  Goal: Minimal or absence of nausea and vomiting  Outcome: Not Progressing  Goal: Maintains or returns to baseline bowel function  Outcome: Not Progressing  Goal: Maintains adequate nutritional intake  Outcome: Not Progressing     Problem: Genitourinary - Adult  Goal: Absence of urinary retention  Outcome: Not Progressing     Problem: Infection - Adult  Goal: Absence of infection at discharge  Outcome: Not Progressing     Problem: Metabolic/Fluid and Electrolytes - Adult  Goal: Electrolytes maintained within normal limits  Outcome: Not Progressing  Goal: Hemodynamic stability and optimal renal function maintained  Outcome: Not Progressing  Goal: Glucose maintained within prescribed range  Outcome: Not Progressing     Problem: Hematologic - Adult  Goal: Maintains hematologic stability  Outcome: Not Progressing

## 2024-08-04 NOTE — PROGRESS NOTES
Pharmacy Vancomycin Consult     Vancomycin Day: 3  Current Dosing: held d/t CRRT holding    Recent Labs     08/03/24  0500 08/03/24  0802 08/04/24  0552 08/04/24  0553   BUN 21  --   --  35*   CREATININE 3.39* 3.6*  --  4.28*   WBC 33.2*  --  23.1*  --        Intake/Output Summary (Last 24 hours) at 8/4/2024 1431  Last data filed at 8/4/2024 1229  Gross per 24 hour   Intake 1090.76 ml   Output 1100 ml   Net -9.24 ml     Cultures  Recent Labs     08/03/24  1040 08/03/24  1029   BC  --  No Growth to date.  Any change in status will be called.   BLOODCULT2 No Growth to date.  Any change in status will be called.  --      Height: 172.7 cm (5' 8\"), Weight - Scale: 96.8 kg (213 lb 8 oz), Body mass index is 32.46 kg/m².    Estimated Creatinine Clearance: 16 mL/min (A) (based on SCr of 4.28 mg/dL (H)).  .    Trough:  Recent Labs     08/03/24  0936   VANCOTROUGH 18.2      Assessment/Plan:  CRRT clotted off unable to use. Will revert to level based monitoring. Will give 750mg dose x 1 with repeat level 24 hours following to assess continued dosing. Timing of future trough levels may be adjusted based on culture results, renal function, and clinical response.    Thank you,  Leti Freeman, [facility] PharmD

## 2024-08-04 NOTE — CONSULTS
Subjective:      Patient ID: Michelle Le is a 64 y.o. female who presents today for cardiorespiratory arrest.    HPI 64-year right-handed female who presented with sequent anemia and a nonfunctioning AV graft.  Patient had fistulogram with intervention and with interventional radiology yesterday and during the procedure patient had cardiorespiratory arrest and was intubated in the ICU.  Patient unresponsive.  Patient initially treated with sedation which has been discontinued 24 hours and patient does not appear to be awakening.    Patient has extensive medical history including angina coronary artery disease with angioplasty is congestive heart failure CKD and patient on hemodialysis.  There appears to be some report of his seizure though we have not seen any documentation.  Patient has other risk factors for cerebrovascular disease including hypertension diabetes with nonrheumatic mitral valve regurgitation and a history of schizophrenia and has had multiple other surgical interventions including knee replacements    Review of Systems   Unable to perform ROS: Intubated       Past Medical History:   Diagnosis Date    Angina at rest ([facility]) 5/24/2020    Anxiety     CAD S/P percutaneous coronary angioplasty 2015, 2018    stents per Huong Sanabria    CHF (congestive heart failure) ([facility])     CKD (chronic kidney disease) stage 4, GFR 15-29 ml/min ([facility]) 2/24/2018    CKD stage 4 due to type 2 diabetes mellitus ([facility])     Contusion of right chest wall 2/16/2021    COPD (chronic obstructive pulmonary disease) ([facility])     Diabetic nephropathy with proteinuria ([facility]) 2014    DJD (degenerative joint disease) of knee     Dr Sharma    GERD (gastroesophageal reflux disease)     Hemiparesis, left ([facility]) 2013    entered Assisted Living ([place])    Hemodialysis patient ([facility])     Hemodialysis-associated hypotension 10/22/2021    History of heart failure     History of seizures     History of type C viral hepatitis   08/02/2024 12:02 PM    INR 1.6 08/02/2024 12:02 PM     Lab Results   Component Value Date/Time    TSH 1.310 12/25/2023 10:30 PM    [id number] 1149 07/30/2024 05:00 AM    FOLATE >20.0 07/30/2024 05:00 AM    FERRITIN 1,897 07/30/2024 05:00 AM    IRON 68 07/30/2024 05:00 AM    TIBC 134 07/30/2024 05:00 AM     Lab Results   Component Value Date/Time    TRIG 410 08/03/2024 06:15 PM    HDL 33 04/09/2022 03:22 AM     Lab Results   Component Value Date/Time    BARBSCNU Neg 11/22/2019 08:30 PM    LABBENZ Neg 11/22/2019 08:30 PM    LABMETH Neg 11/22/2019 08:30 PM    ETOH <10 03/05/2024 04:00 PM     No results found for: \"LITHIUM\", \"DILFRTOT\", \"VALPROATE\"    Assessment:   Hypoxic ischemic encephalopathy secondary cardiorespiratory arrest with quick resuscitation though patient remained hypotensive for a prolonged period of time.  Given the timeframe of events the outcome can be guarded.  CT scan reviewed and reported a possibility of a watershed infarct but I do not think anything is changed at least on the CT scan.  More details can be only assessed with an MRI which we will pursue if needed.  For now we will arrange for an EEG to see if there are any responses.  Patient should continue dialysis for better prognosis in terms of neurological examination    Medical decision making was high complexity due to patient's Multiple symptoms advancing disease extensive historical data and medical records COVID diagnostic difficulties as well as patient's extensive relation of her events at the code    Darren Guadarrama MD, TACOS  Diplomate, American Board of Psychiatry & Neurology  Board Certified in Vascular Neurology  Board Certified in Neuromuscular Medicine  Certified in Neurorehabilitation         Plan:

## 2024-08-04 NOTE — PROGRESS NOTES
Pulmonary & Critical Care Medicine ICU Progress Note  Chief complaint : Shock    Subjunctive/24 hour events :   Patient seen and examined during multidisciplinary rounds with RN, charge nurse, RT, pharmacy, dietitian, and social service.   Responsive, on vent, sedation turned off this morning, she is on Levophed at 14 mcg/min and vasopressin 0.03 units/min, she is off CRRT for now, no fever, she is on 50% FiO2 saturation 100%, NG output 1400 cc, +7 L    New information updated in the note today, rest of the examination did not change compared to yesterday.  Social History     Tobacco Use    Smoking status: Never     Passive exposure: Never    Smokeless tobacco: Never   Substance Use Topics    Alcohol use: No     Alcohol/week: 0.0 standard drinks of alcohol         Problem Relation Age of Onset    Cancer Mother 68        survived    Breast Cancer Mother     Hypertension Father     Diabetes Sister     Mental Illness Sister     Colon Cancer Neg Hx        Recent Labs     08/03/24  0443 08/03/24  0802   PHART 7.196* 7.344*   IPH2EIM 51* 34*   PO2ART 147* 103         MV Settings:  Vent Mode: AC/VC Resp Rate (Set): 30 bpm/Vt (Set, mL): 350 mL/ /FiO2 : 50 %           IV:   prismaSATE BGK 4/2.5      prismaSATE BGK 4/2.5      prismaSATE BGK 4/2.5      VASOpressin 20 Units in sodium chloride 0.9 % 100 mL infusion 0.03 Units/min (08/04/24 0711)    norepinephrine 14 mcg/min (08/04/24 0711)    midazolam 1 mg/hr (08/03/24 0248)    fentaNYL Stopped (08/03/24 0905)    propofol Stopped (08/03/24 0903)    sodium chloride      sodium chloride      sodium chloride      dextrose         Vitals:  BP (!) 107/49   Pulse 74   Temp 98.2 °F (36.8 °C)   Resp 30   Ht 1.727 m (5' 8\")   Wt 96.8 kg (213 lb 8 oz)   LMP  (LMP Unknown)   SpO2 100%   BMI 32.46 kg/m²    Tmax:        Intake/Output Summary (Last 24 hours) at 8/4/2024 0944  Last data filed at 8/4/2024 0800  Gross per 24 hour   Intake 1219.49 ml   Output 1100 ml   Net 119.49 ml  a large volume of ascites within the abdomen and pelvis.  2. Cirrhotic appearing liver.  3. Cholelithiasis.  4. Bilateral pleural effusions with bibasilar consolidative airspace disease  concerning for pneumonia and/or atelectasis.         Assessment:  This is a critically ill patient at risk of deterioration / death , needing close ICU monitoring and intervention due to below noted problems     Postcardiac arrest  Septic shock  Acute encephalopathy postcardiac arrest  Acute respiratory insufficiency  Hypoadrenalism  Anemia, secondary to blood loss from the fistula  End-stage renal disease on CRRT  Adrenal insufficiency  Ascites status post paracentesis 7/30/2020,  4 L removed  Bleed from dialysis site  A-fib, cannot anticoagulate due to recurrent bleed and blood transfusion  Hypereosinophilia     Recommendation  Vent support lung protective strategy  head of the bed 30°  Daily sedation holidays and breathing trials  Sedation with combination propofol and fentanyl target R ASS of 0 to -1  Watch for ICU delirium: TV on, natural light, avoid benzos, pain control, early mobility, and family engagement  PUD prophylaxis  DVT prophylaxis  Start tube feed  Target blood sugar 140-180  Levophed to maintain MAP ~65-70  Vasopressin  Monitor and replace electrolyte as needed  Resume dialysis if okay with nephrology  Continue meropenem  CT head  Hold Abilify and Zoloft for now    DW  Dr Finch      Due to the immediate potential for life-threatening deterioration due to septic shock I spent 35   minutes providing critical care.  This time is excluding time spent performing procedures.          Electronically signed by Say Marsh MD,  [facility] ,on 8/4/2024 at 9:44 AM

## 2024-08-04 NOTE — PROGRESS NOTES
CONSULTANT  Virgilio Howell DO      REQUESTING PHYSICIAN  Werner Ortiz MD                REASON FOR CONSULTATION       Chief Complaint   Patient presents with    Hypotension       Picked at HD site and started bleeding, hypotensive when EMS arrived         Hospital Day: 5         Patient is a 64 y.o. female who presents with a chief complaint of hypotension. Patient is followed on a regular basis by Adilene Tom MD. patient with multiple medical problems who presented with hypotension from dialysis center in the setting of acute bleeding from hemodialysis access she is currently on the vent and sedated.  Most of the information was obtained from the staff as well as the chart.  Patient apparently had a PEA arrest during IR intervention.  Had 1 round of CPR and 1 mg of epinephrine given  Patient with history of paroxysmal atrial fibrillation.  Had brief episode of A-fib however currently she is in normal sinus rhythm on telemetry    8-2-24: Remains on vent and sedated.  On Levophed and about to initiate vasopressin.  Patient on CRRT.  Also on normothermia protocol.  Appears to be in A-fib on telemetry.  Status post echocardiogram yesterday with ejection fraction of 55 to 60%    8/3/24 covering Dr. Howell.  Increased WBC. Tele SR. On both Levo and Vaso. Reamins vented on 50% FIO2.     8/4/24 Tele 77. Remains vented 40% FIO2. Off sedation but unresponsive. No Gag reflex. Minimal cough reflex to suctioning. DaughterAngelina, in room. Discussed poor Prognosis.      Past Medical History        Past Medical History:   Diagnosis Date    Angina at rest ([facility]) 5/24/2020    Anxiety      CAD S/P percutaneous coronary angioplasty 2015, 2018     stents per Huong Sanabria    CHF (congestive heart failure) ([facility])      CKD (chronic kidney disease) stage 4, GFR 15-29 ml/min ([facility]) 2/24/2018    CKD stage 4 due to type 2 diabetes mellitus ([facility])      Contusion of right chest wall 2/16/2021    COPD (chronic obstructive  record by the nurse. Mallapati score 2 ASA 3 FLUOROSCOPY DOSE AND TYPE: Radiation Exposure Index: 25.1 mGy, fluoro time 3.4 minutes DESCRIPTION OF PROCEDURE: Informed consent was obtained after a detailed explanation of the procedure including risks, benefits, and alternatives.  Universal protocol was observed. Sterile gowns, masks, hats and gloves utilized for maximal sterile barrier. When patient presented to angio suite she was actively bleeding for what appeared to be ulcerated wound overlying the fistula with bleeding that was sometimes pulsatile in nature.  The left arm was quickly prepped in sterile fashion using chlorhexidine maximum sterile barrier.  1% lidocaine was infiltrated around the bleeding wound as pressure was held on the fistula to decrease bleeding.  A 2-0 prolene suture was then placed in a pursestring fashion around the bleeding wound and hemostasis was achieved. Next ultrasound evaluation of the fistula was performed showing occlusion near the venous anastomosis.  No pseudoaneurysm was seen.  1% lidocaine was infiltrated and micropuncture access in antegrade direction was obtained in the mid fistula.  Micro sheath was inserted.  Through this a fistulogram was performed showing complete occlusion of the fistula at the venous anastomosis.  A 0.35 guidewire was then advanced over which a 6 Danish sheath was inserted.  Guidewire was able to be advanced through the occlusion into the subclavian vein.  Over wire through the sheath a 7 mm balloon was advanced and used to angioplasty the venous anastomosis where she had previous a stenosis.  During the angioplasty it was noted that the patient has O2 saturation, blood pressure and heart rate began to decrease.  The procedure was halted and a cold was immediately called.  CPR was initiated, epinephrine was given patient was connected to defibrillator and then intubated by ICU physician.  Sheath in the arm was removed and hemostasis was achieved by  seen.  There may be a small right pleural effusion      1. Left neck central venous catheter, tip in the expected location of the brachiocephalic/SVC junction. 2. Cardiomegaly with mild pulmonary venous congestion. 3. Possible small right pleural effusion.      FLUORO FOR SURGICAL PROCEDURES     Result Date: 7/30/2024  EXAMINATION: SPOT FLUOROSCOPIC IMAGES 7/30/2024 6:39 am TECHNIQUE: Fluoroscopy was provided by the radiology department for procedure. Radiologist was not present during examination. FLUOROSCOPY DOSE AND TYPE: Radiation Exposure Index: Kerma mGy, 38.6 COMPARISON: None HISTORY: ORDERING SYSTEM PROVIDED HISTORY: pain TECHNOLOGIST PROVIDED HISTORY: Reason for exam:->pain What reading provider will be dictating this exam?->CRC Intraprocedural imaging. FINDINGS: Spot intraoperative images are obtained demonstrating a left sided MediPort catheter.      Intraprocedural fluoroscopic spot images as above.  See separate procedure report for more information.      CT ABDOMEN PELVIS WO CONTRAST Additional Contrast? None     Result Date: 7/29/2024  EXAMINATION: CT OF THE ABDOMEN AND PELVIS WITHOUT CONTRAST 7/29/2024 10:39 am TECHNIQUE: CT of the abdomen and pelvis was performed without the administration of intravenous contrast. Multiplanar reformatted images are provided for review. Automated exposure control, iterative reconstruction, and/or weight based adjustment of the mA/kV was utilized to reduce the radiation dose to as low as reasonably achievable. COMPARISON: 07/01/2024 HISTORY: ORDERING SYSTEM PROVIDED HISTORY: R/O bleed TECHNOLOGIST PROVIDED HISTORY: Reason for exam:->R/O bleed Additional Contrast?->None What reading provider will be dictating this exam?->CRC FINDINGS: Lower Chest: Small bilateral pleural effusions.  There is cardiac chamber enlargement.  Interseptal thickening and ground-glass opacity with bronchial wall thickening to suggest mild interstitial edema at the lung bases. Replacement of

## 2024-08-04 NOTE — PROGRESS NOTES
Hospitalist Progress Note      PCP: Adilene Terry MD    Date of Admission: 7/27/2024    Chief Complaint:  remains intubated, remains unresponsive off sedation per nursing staff, CT head pending, hypotensive requiring Norepinephrine and Vasopressin support    Medications:  Reviewed    Infusion Medications    VASOpressin 20 Units in sodium chloride 0.9 % 100 mL infusion 0.03 Units/min (08/04/24 0711)    norepinephrine 14 mcg/min (08/04/24 0711)    prismaSATE BGK 4/2.5 200 mL/hr (08/01/24 2312)    midazolam 1 mg/hr (08/03/24 0248)    fentaNYL Stopped (08/03/24 0905)    propofol Stopped (08/03/24 0903)    prismaSATE BGK 4/2.5 1,000 mL/hr at 08/02/24 0952    prismaSATE BGK 4/2.5 1,000 mL/hr at 08/02/24 0950    sodium chloride      sodium chloride      sodium chloride      dextrose       Scheduled Medications    meropenem  500 mg IntraVENous Q12H    hydrocortisone sodium succinate PF  100 mg IntraVENous Q8H    docusate  100 mg Oral BID    polyethylene glycol  17 g Oral Daily    vancomycin (VANCOCIN) intermittent dosing (placeholder)   Other RX Placeholder    lactulose  20 g Oral TID    miconazole   Topical BID    chlorhexidine  15 mL Mouth/Throat BID    insulin lispro  0-16 Units SubCUTAneous Q4H    pantoprazole (PROTONIX) 40 mg in sodium chloride (PF) 0.9 % 10 mL injection  40 mg IntraVENous Daily    sodium chloride flush  5-40 mL IntraVENous 2 times per day    sodium chloride flush  5-40 mL IntraVENous 2 times per day    Virt-Caps  1 capsule Oral Daily    Vitamin D  2,000 Units Oral Daily    [Held by provider] ARIPiprazole  5 mg Oral Daily    fludrocortisone  0.1 mg Oral BID    [Held by provider] sertraline  50 mg Oral Daily    miconazole   Topical BID    epoetin chadwick-epbx  8,000 Units SubCUTAneous Once per day on Mon Wed Fri    sodium chloride flush  5-40 mL IntraVENous 2 times per day    insulin glargine  35 Units SubCUTAneous Nightly    midodrine  20 mg Oral TID WC    [Held by provider] budesonide-formoterol  2  puff Inhalation BID RT    And    [Held by provider] tiotropium  2 puff Inhalation Daily RT    pill splitter   Does not apply Once     PRN Meds: perflutren lipid microspheres, heparin (porcine) **AND** heparin (porcine), perflutren lipid microspheres, fentaNYL **AND** fentaNYL, ipratropium 0.5 mg-albuterol 2.5 mg, heparin (porcine), heparin (porcine) **AND** heparin (porcine), naloxone 0.4 mg in 10 mL sodium chloride syringe, sodium chloride flush, sodium chloride, fentanNYL, HYDROmorphone, sodium chloride flush, sodium chloride, albuterol, melatonin, sodium chloride flush, sodium chloride, ondansetron **OR** ondansetron, polyethylene glycol, acetaminophen **OR** acetaminophen, glucose, dextrose bolus **OR** dextrose bolus, glucagon (rDNA), dextrose      Intake/Output Summary (Last 24 hours) at 8/4/2024 0824  Last data filed at 8/4/2024 0800  Gross per 24 hour   Intake 1219.49 ml   Output 1100 ml   Net 119.49 ml         Exam:    BP (!) 107/49   Pulse 74   Temp 98.2 °F (36.8 °C)   Resp 30   Ht 1.727 m (5' 8\")   Wt 96.8 kg (213 lb 8 oz)   LMP  (LMP Unknown)   SpO2 99%   BMI 32.46 kg/m²     General appearance: intubated, unresponsive.  Respiratory:  clear to auscultation bilaterally  Cardiovascular: Regular rate and rhythm, S1/S2  Abdomen: Soft, active bowel sounds.  Musculoskeletal: edema bilaterally.       Labs:   Recent Labs     08/02/24 0458 08/03/24 0443 08/03/24  0500 08/03/24  0802 08/04/24  0552   WBC 17.0*  --  33.2*  --  23.1*   HGB 9.7*   < > 10.5* 9.8* 9.0*   HCT 28.2*  --  29.9*  --  26.7*   PLT 97*  --  95*  --  100*    < > = values in this interval not displayed.       Recent Labs     08/02/24  0459 08/03/24  0443 08/03/24  0500 08/03/24  0802 08/04/24  0553   *  --  128*  --  133*   K 3.6  --  5.3*  --  5.3*   CL 99  --  93*  --  99   CO2 21  --  17*  --  18*   BUN 20  --  21  --  35*   CREATININE 3.27*   < > 3.39* 3.6* 4.28*   CALCIUM 9.1  --  9.7  --  9.3   PHOS 2.8  --  6.1*  --  6.6*     < > = values in this interval not displayed.       Recent Labs     08/02/24  1202   AST NOT DONE   ALT NOT DONE   BILIDIR NOT DONE   BILITOT 2.4*   ALKPHOS 108       Recent Labs     08/02/24  0830 08/02/24  1202   INR 1.4 1.6       No results for input(s): \"CKTOTAL\", \"TROPONINI\" in the last 72 hours.    Urinalysis:      Lab Results   Component Value Date/Time    NITRU Negative 10/06/2022 11:15 PM    WBCUA >100 10/06/2022 11:15 PM    BACTERIA MANY 10/06/2022 11:15 PM    RBCUA 10-20 10/06/2022 11:15 PM    BLOODU SMALL 10/06/2022 11:15 PM    SPECGRAV 1.030 09/17/2021 12:49 PM    GLUCOSEU 250 10/06/2022 11:15 PM       Radiology:  CT ABDOMEN PELVIS WO CONTRAST Additional Contrast? None   Final Result   1. There is a large volume of ascites within the abdomen and pelvis.   2. Cirrhotic appearing liver.   3. Cholelithiasis.   4. Bilateral pleural effusions with bibasilar consolidative airspace disease   concerning for pneumonia and/or atelectasis.         Vascular duplex hemodialysis access left   Final Result   Thrombosed fistula with complete occlusion at the venous anastomosis.  Due to   deterioration patient's condition the procedure was aborted and not completed.         IR FISTULAGRAM   Final Result   Thrombosed fistula with complete occlusion at the venous anastomosis.  Due to   deterioration patient's condition the procedure was aborted and not completed.         IR THROMBECTOMY AVF PERC   Final Result   Thrombosed fistula with complete occlusion at the venous anastomosis.  Due to   deterioration patient's condition the procedure was aborted and not completed.         XR CHEST PORTABLE   Final Result   1. Endotracheal tube 24.8 cm above the tabitha.   2. Left central venous catheter tip in the expected location of the left   brachiocephalic vein.   3. Cardiomegaly with pulmonary vascular congestion.         CT HEAD WO CONTRAST   Final Result   No acute intracranial abnormality.         XR CHEST (SINGLE VIEW

## 2024-08-04 NOTE — PROGRESS NOTES
[facility]   Pharmacy Dose Adjustment Per Protocol:  Meropenem Extended Interval Interchange    Michelle Le is a 64 y.o. female.     The following ordered dose of Meropenem has been changed to optimize its pharmacodynamic profile per [facility] pharmacy policy approved by [facility].    Recent Labs     08/03/24  0500 08/03/24  0802 08/04/24  0552 08/04/24  0553   CREATININE 3.39* 3.6*  --  4.28*   BUN 21  --   --  35*   WBC 33.2*  --  23.1*  --      .  Height: 172.7 cm (5' 8\"), Weight - Scale: 96.8 kg (213 lb 8 oz), Body mass index is 32.46 kg/m².  Estimated Creatinine Clearance: 16 mL/min (A) (based on SCr of 4.28 mg/dL (H)).      New Dose    ID needs consulted for therapy to extend beyond 72 hr    Meropenem - Extended Infusion (3-hour infusion) - Preferred Dosing Strategy    Renal function (CrCl mL/min)  ? 50  26 - 49  10 - 25   < 10, HD, PD  CRRT    All indications - Loading dose of 3270-4558 milligrams x 1 over 30 minutes or via IV push (based on indication). Maintenance dose should begin at the next regularly scheduled dosing interval based on indication/renal function.    Maintenance dosing for all indications except as outlined below  1000mg q8h ¨ 1000mg q12h ¨ 500 mg q12h ¨ 500 mg q24h ¨ 1000mg q12h^ x   CNS infections, Cystic fibrosis, DIYA > 4  2000mg q8h ¨ 2000mg q12h ¨ 1000mg q12h ¨ 1000mg q24h ¨ 2000mg q12h† ¨    ^Consider 1000mg q8h for CRRT effluent rates > 3L/h   †Consider 2000mg q8h for CRRT effluent rates ? 3L/h     Patient CRRT clot off. Will revert to 500mg IV q12h as it has been stopped at this time    Pharmacists should be contacted for issues concerning drug compatibility with multiple IV medications.  All doses will be prepared using 100ml bag to be infused over 3-hours at a rate of 33.3 ml/hr.    Thank You,  Leti [facility], [facility] PharmD

## 2024-08-05 PROBLEM — J15.0 PNEUMONIA OF BOTH LUNGS DUE TO KLEBSIELLA PNEUMONIAE (HCC): Status: ACTIVE | Noted: 2024-08-05

## 2024-08-05 PROBLEM — I46.9 CARDIOPULMONARY ARREST (HCC): Status: ACTIVE | Noted: 2024-08-05

## 2024-08-05 LAB
ALBUMIN SERPL-MCNC: 2.9 G/DL (ref 3.5–4.6)
ANCA IFA PATTERN: NORMAL
ANCA IFA TITER: NORMAL
ANION GAP SERPL CALCULATED.3IONS-SCNC: 21 MEQ/L (ref 9–15)
BASOPHILS # BLD: 0 K/UL (ref 0–0.2)
BASOPHILS NFR BLD: 0.1 %
BUN SERPL-MCNC: 50 MG/DL (ref 8–23)
CALCIUM SERPL-MCNC: 9.5 MG/DL (ref 8.5–9.9)
CHLORIDE SERPL-SCNC: 102 MEQ/L (ref 95–107)
CO2 SERPL-SCNC: 14 MEQ/L (ref 20–31)
CREAT SERPL-MCNC: 4.71 MG/DL (ref 0.5–0.9)
CRP SERPL HS-MCNC: 161.1 MG/L (ref 0–5)
EOSINOPHIL # BLD: 0 K/UL (ref 0–0.7)
EOSINOPHIL NFR BLD: 0 %
ERYTHROCYTE [DISTWIDTH] IN BLOOD BY AUTOMATED COUNT: 21.9 % (ref 11.5–14.5)
GLUCOSE BLD-MCNC: 133 MG/DL (ref 70–99)
GLUCOSE BLD-MCNC: 155 MG/DL (ref 70–99)
GLUCOSE BLD-MCNC: 166 MG/DL (ref 70–99)
GLUCOSE BLD-MCNC: 184 MG/DL (ref 70–99)
GLUCOSE BLD-MCNC: 207 MG/DL (ref 70–99)
GLUCOSE BLD-MCNC: 208 MG/DL (ref 70–99)
GLUCOSE SERPL-MCNC: 208 MG/DL (ref 70–99)
HCT VFR BLD AUTO: 24.7 % (ref 37–47)
HGB BLD-MCNC: 8.3 G/DL (ref 12–16)
LYMPHOCYTES # BLD: 0.9 K/UL (ref 1–4.8)
LYMPHOCYTES NFR BLD: 3.9 %
MCH RBC QN AUTO: 30.1 PG (ref 27–31.3)
MCHC RBC AUTO-ENTMCNC: 33.6 % (ref 33–37)
MCV RBC AUTO: 89.5 FL (ref 79.4–94.8)
MONOCYTES # BLD: 0.8 K/UL (ref 0.2–0.8)
MONOCYTES NFR BLD: 3.4 %
MYELOPEROXIDASE AB SER-ACNC: 0 AU/ML (ref 0–19)
NEUTROPHILS # BLD: 20.9 K/UL (ref 1.4–6.5)
NEUTS SEG NFR BLD: 91 %
PATH INTERP BLD-IMP: NORMAL
PERFORMED ON: ABNORMAL
PHOSPHATE SERPL-MCNC: 7.1 MG/DL (ref 2.3–4.8)
PLATELET # BLD AUTO: 87 K/UL (ref 130–400)
POTASSIUM SERPL-SCNC: 5.5 MEQ/L (ref 3.4–4.9)
PROCALCITONIN SERPL IA-MCNC: 5.34 NG/ML (ref 0–0.15)
PROTEINASE3 AB SER-ACNC: 0 AU/ML (ref 0–19)
RBC # BLD AUTO: 2.76 M/UL (ref 4.2–5.4)
SODIUM SERPL-SCNC: 137 MEQ/L (ref 135–144)
VANCOMYCIN TROUGH SERPL-MCNC: 26.4 UG/ML (ref 10–20)
WBC # BLD AUTO: 22.5 K/UL (ref 4.8–10.8)

## 2024-08-05 PROCEDURE — 86140 C-REACTIVE PROTEIN: CPT

## 2024-08-05 PROCEDURE — 6360000002 HC RX W HCPCS: Performed by: INTERNAL MEDICINE

## 2024-08-05 PROCEDURE — 2580000003 HC RX 258: Performed by: NURSE PRACTITIONER

## 2024-08-05 PROCEDURE — 80202 ASSAY OF VANCOMYCIN: CPT

## 2024-08-05 PROCEDURE — 85025 COMPLETE CBC W/AUTO DIFF WBC: CPT

## 2024-08-05 PROCEDURE — 2580000003 HC RX 258: Performed by: INTERNAL MEDICINE

## 2024-08-05 PROCEDURE — 90945 DIALYSIS ONE EVALUATION: CPT

## 2024-08-05 PROCEDURE — 99233 SBSQ HOSP IP/OBS HIGH 50: CPT | Performed by: PSYCHIATRY & NEUROLOGY

## 2024-08-05 PROCEDURE — 6360000002 HC RX W HCPCS: Performed by: COLON & RECTAL SURGERY

## 2024-08-05 PROCEDURE — 99233 SBSQ HOSP IP/OBS HIGH 50: CPT | Performed by: INTERNAL MEDICINE

## 2024-08-05 PROCEDURE — 2580000003 HC RX 258: Performed by: COLON & RECTAL SURGERY

## 2024-08-05 PROCEDURE — 6370000000 HC RX 637 (ALT 250 FOR IP): Performed by: NURSE PRACTITIONER

## 2024-08-05 PROCEDURE — 2500000003 HC RX 250 WO HCPCS: Performed by: NURSE PRACTITIONER

## 2024-08-05 PROCEDURE — 2580000003 HC RX 258: Performed by: ANESTHESIOLOGY

## 2024-08-05 PROCEDURE — 2580000003 HC RX 258

## 2024-08-05 PROCEDURE — 2700000000 HC OXYGEN THERAPY PER DAY

## 2024-08-05 PROCEDURE — 6370000000 HC RX 637 (ALT 250 FOR IP): Performed by: COLON & RECTAL SURGERY

## 2024-08-05 PROCEDURE — 99291 CRITICAL CARE FIRST HOUR: CPT | Performed by: INTERNAL MEDICINE

## 2024-08-05 PROCEDURE — 6370000000 HC RX 637 (ALT 250 FOR IP): Performed by: INTERNAL MEDICINE

## 2024-08-05 PROCEDURE — 94003 VENT MGMT INPAT SUBQ DAY: CPT

## 2024-08-05 PROCEDURE — 80069 RENAL FUNCTION PANEL: CPT

## 2024-08-05 PROCEDURE — 6360000002 HC RX W HCPCS: Performed by: NURSE PRACTITIONER

## 2024-08-05 PROCEDURE — 99223 1ST HOSP IP/OBS HIGH 75: CPT | Performed by: INTERNAL MEDICINE

## 2024-08-05 PROCEDURE — 2000000000 HC ICU R&B

## 2024-08-05 PROCEDURE — 84145 PROCALCITONIN (PCT): CPT

## 2024-08-05 PROCEDURE — 95816 EEG AWAKE AND DROWSY: CPT

## 2024-08-05 RX ORDER — WATER 10 ML/10ML
INJECTION INTRAMUSCULAR; INTRAVENOUS; SUBCUTANEOUS
Status: COMPLETED
Start: 2024-08-05 | End: 2024-08-05

## 2024-08-05 RX ADMIN — INSULIN LISPRO 4 UNITS: 100 INJECTION, SOLUTION INTRAVENOUS; SUBCUTANEOUS at 05:05

## 2024-08-05 RX ADMIN — Medication 12 MCG/MIN: at 17:31

## 2024-08-05 RX ADMIN — HYDROCORTISONE SODIUM SUCCINATE 100 MG: 100 INJECTION, POWDER, FOR SOLUTION INTRAMUSCULAR; INTRAVENOUS at 15:49

## 2024-08-05 RX ADMIN — MICONAZOLE NITRATE: 20 CREAM TOPICAL at 21:14

## 2024-08-05 RX ADMIN — WATER 10 ML: 1 INJECTION INTRAMUSCULAR; INTRAVENOUS; SUBCUTANEOUS at 15:49

## 2024-08-05 RX ADMIN — FLUDROCORTISONE ACETATE 0.1 MG: 0.1 TABLET ORAL at 21:14

## 2024-08-05 RX ADMIN — Medication 10 ML: at 09:00

## 2024-08-05 RX ADMIN — Medication 1000 ML/HR: at 23:59

## 2024-08-05 RX ADMIN — HYDROCORTISONE SODIUM SUCCINATE 100 MG: 100 INJECTION, POWDER, FOR SOLUTION INTRAMUSCULAR; INTRAVENOUS at 02:46

## 2024-08-05 RX ADMIN — HEPARIN SODIUM 2500 UNITS: 1000 INJECTION INTRAVENOUS; SUBCUTANEOUS at 14:35

## 2024-08-05 RX ADMIN — VASOPRESSIN 0.02 UNITS/MIN: 20 INJECTION INTRAVENOUS at 07:48

## 2024-08-05 RX ADMIN — MICONAZOLE NITRATE: 2 POWDER TOPICAL at 08:34

## 2024-08-05 RX ADMIN — Medication 1000 ML/HR: at 18:42

## 2024-08-05 RX ADMIN — CHLORHEXIDINE GLUCONATE 15 ML: 1.2 RINSE ORAL at 08:33

## 2024-08-05 RX ADMIN — LACTULOSE 20 G: 20 SOLUTION ORAL at 15:49

## 2024-08-05 RX ADMIN — MEROPENEM 500 MG: 500 INJECTION, POWDER, FOR SOLUTION INTRAVENOUS at 11:24

## 2024-08-05 RX ADMIN — Medication 1000 ML/HR: at 18:43

## 2024-08-05 RX ADMIN — LACTULOSE 20 G: 20 SOLUTION ORAL at 21:14

## 2024-08-05 RX ADMIN — Medication: at 18:19

## 2024-08-05 RX ADMIN — MIDODRINE HYDROCHLORIDE 20 MG: 10 TABLET ORAL at 17:01

## 2024-08-05 RX ADMIN — CHLORHEXIDINE GLUCONATE 15 ML: 1.2 RINSE ORAL at 21:14

## 2024-08-05 RX ADMIN — MEROPENEM 500 MG: 500 INJECTION, POWDER, FOR SOLUTION INTRAVENOUS at 23:50

## 2024-08-05 RX ADMIN — MIDODRINE HYDROCHLORIDE 20 MG: 10 TABLET ORAL at 08:33

## 2024-08-05 RX ADMIN — SODIUM CHLORIDE, PRESERVATIVE FREE 40 MG: 5 INJECTION INTRAVENOUS at 08:33

## 2024-08-05 RX ADMIN — LACTULOSE: 10 SOLUTION ORAL; RECTAL at 14:53

## 2024-08-05 RX ADMIN — DOCUSATE SODIUM 100 MG: 50 LIQUID ORAL at 21:14

## 2024-08-05 RX ADMIN — MICONAZOLE NITRATE: 2 POWDER TOPICAL at 21:15

## 2024-08-05 RX ADMIN — Medication 2000 UNITS: at 08:33

## 2024-08-05 RX ADMIN — Medication 1000 ML/HR: at 23:58

## 2024-08-05 RX ADMIN — NEPHROCAP 1 MG: 1 CAP ORAL at 09:00

## 2024-08-05 RX ADMIN — MIDODRINE HYDROCHLORIDE 20 MG: 10 TABLET ORAL at 11:18

## 2024-08-05 RX ADMIN — ALTEPLASE 2 MG: 2.2 INJECTION, POWDER, LYOPHILIZED, FOR SOLUTION INTRAVENOUS at 12:50

## 2024-08-05 RX ADMIN — FLUDROCORTISONE ACETATE 0.1 MG: 0.1 TABLET ORAL at 08:33

## 2024-08-05 RX ADMIN — EPOETIN ALFA-EPBX 8000 UNITS: 4000 INJECTION, SOLUTION INTRAVENOUS; SUBCUTANEOUS at 17:00

## 2024-08-05 ASSESSMENT — PULMONARY FUNCTION TESTS
PIF_VALUE: 29
PIF_VALUE: 28
PIF_VALUE: 35
PIF_VALUE: 29
PIF_VALUE: 29
PIF_VALUE: 28
PIF_VALUE: 29
PIF_VALUE: 30
PIF_VALUE: 31
PIF_VALUE: 29
PIF_VALUE: 27
PIF_VALUE: 29
PIF_VALUE: 28
PIF_VALUE: 29
PIF_VALUE: 28
PIF_VALUE: 29
PIF_VALUE: 30
PIF_VALUE: 32
PIF_VALUE: 29
PIF_VALUE: 30
PIF_VALUE: 28
PIF_VALUE: 28
PIF_VALUE: 29
PIF_VALUE: 30
PIF_VALUE: 29
PIF_VALUE: 28
PIF_VALUE: 29
PIF_VALUE: 27
PIF_VALUE: 31
PIF_VALUE: 27
PIF_VALUE: 29
PIF_VALUE: 28
PIF_VALUE: 28
PIF_VALUE: 30
PIF_VALUE: 28
PIF_VALUE: 30
PIF_VALUE: 28
PIF_VALUE: 24
PIF_VALUE: 33
PIF_VALUE: 29
PIF_VALUE: 28
PIF_VALUE: 29
PIF_VALUE: 33
PIF_VALUE: 29
PIF_VALUE: 31
PIF_VALUE: 28
PIF_VALUE: 29
PIF_VALUE: 29
PIF_VALUE: 28
PIF_VALUE: 30
PIF_VALUE: 30
PIF_VALUE: 29
PIF_VALUE: 28
PIF_VALUE: 28
PIF_VALUE: 30
PIF_VALUE: 28
PIF_VALUE: 33
PIF_VALUE: 27
PIF_VALUE: 30
PIF_VALUE: 29
PIF_VALUE: 29
PIF_VALUE: 30
PIF_VALUE: 29
PIF_VALUE: 29
PIF_VALUE: 31
PIF_VALUE: 29
PIF_VALUE: 28
PIF_VALUE: 34
PIF_VALUE: 29
PIF_VALUE: 28
PIF_VALUE: 29
PIF_VALUE: 28
PIF_VALUE: 33
PIF_VALUE: 29
PIF_VALUE: 30
PIF_VALUE: 29
PIF_VALUE: 33
PIF_VALUE: 30
PIF_VALUE: 28
PIF_VALUE: 32
PIF_VALUE: 29
PIF_VALUE: 31
PIF_VALUE: 29

## 2024-08-05 ASSESSMENT — PAIN SCALES - GENERAL
PAINLEVEL_OUTOF10: 0

## 2024-08-05 NOTE — PLAN OF CARE
Nutrition Problem #1: Inadequate oral intake  Intervention: Food and/or Nutrient Delivery: Start Tube Feeding  Nutritional

## 2024-08-05 NOTE — PROGRESS NOTES
[facility]   Pharmacy Pharmacokinetic Monitoring Service - Vancomycin    Consulting Provider: Milagros Hanna   Indication: Sepsis  Target Concentration: Pre-Dialysis Concentration 21-24 mg/L  Day of Therapy: 4  Additional Antimicrobials: merrem-     Pertinent Laboratory Values:   Wt Readings from Last 1 Encounters:   08/05/24 98.8 kg (217 lb 13 oz)     Temp Readings from Last 1 Encounters:   08/05/24 (!) 95.5 °F (35.3 °C) (Esophageal)     Recent Labs     08/04/24  0552 08/04/24  0553 08/05/24  0539 08/05/24  0543   CREATININE  --  4.28* 4.71*  --    BUN  --  35* 50*  --    WBC 23.1*  --   --  22.5*     Recent vancomycin administrations                     vancomycin (VANCOCIN) 750 mg in sodium chloride 0.9 % 250 mL IVPB (mg) 750 mg New Bag 08/04/24 1535    vancomycin (VANCOCIN) 750 mg in sodium chloride 0.9 % 250 mL IVPB (mg) 750 mg New Bag 08/02/24 2227                    Assessment:  Date/Time Current Dose Concentration Dialysis Session   08/05/24 @1620 Vanco 750 mg  26.4 CRRT to resume tonight     Plan:  Concentration-guided dosing due to renal impairment and CRRT to begin this evening  Current dosing regimen of 750mg IV daily is supra-therapeutic   Hold dose for tonight   Vancomycin concentration ordered for 08/06/24 @ 0600  Pharmacy will continue to monitor patient and adjust therapy as indicated    Thank you for the consult,  Allyson Salguero [facility]  8/5/2024 4:23 PM

## 2024-08-05 NOTE — PROGRESS NOTES
Spiritual Health Assessment/Progress Note  [facility]    Interdisciplinary rounds, Grief, Loss, and Adjustments, Code Blue,  ,      Name: Michelle Le MRN: [id number]    Age: 64 y.o.     Sex: female   Language: English   Sikhism: Mandaen   Blood loss anemia     Date: 8/5/2024            Total Time Calculated: 20 min         Pt remains intubated and unresponsive. Pt's dtr Paulina (POA) and her  at bedside. Paulina experiencing some decisional conflict, desires to be fully informed about her mother's condition and the possibility of a meaningful recovery prior to engaging in decision making about a trach/compassionate wean/hospice. She does state that her mother last said to her that she wanted all possible life saving measures. Paulina hesitant in the face of possible irreversible brain damage. Pt is Mandaen, Paulina is spiritual but not Pentecostalism.        Spiritual Assessment continued in [facility]        Referral/Consult From: Rounding   Encounter Overview/Reason: Interdisciplinary rounds, Grief, Loss, and Adjustments  Service Provided For: Patient and family together    Pat, Belief, Meaning:   Patient is connected with a pat tradition or spiritual practice  Family/Friends identify as spiritual      Importance and Influence:  Patient has spiritual/personal beliefs that influence decisions regarding their health  Family/Friends have spiritual/personal beliefs that influence decisions regarding the patient's health    Community:  Patient feels well-supported. Support system includes: Children  Family/Friends feel well-supported. Support system includes: Spouse/Partner    Assessment and Plan of Care:     Patient Interventions include: Other: intubated.   Family/Friends Interventions include: Explored spiritual coping/struggle/distress and theological reflection, Affirmed coping skills/support systems, and Other: supported decision making.     Patient Plan of Care: Spiritual Care available  upon further referral  Family/Friends Plan of Care: Spiritual Care available upon further referral    Electronically signed by LUKAS Rosenthal on 8/5/2024 at 11:53 AM

## 2024-08-05 NOTE — PROGRESS NOTES
Nephrology Progress Note    Assessment:  ESRDX  Hypotension  DM type-2  Hx CVA  Known Hepatitis-C      Plan: needs permanent catheter and will start CRRT  prognosis poor  family aware     Patient Active Problem List:     Coronary artery disease involving native coronary artery of native heart with angina pectoris ([facility])     Schizophrenia, paranoid, chronic     Metabolic syndrome     Vitamin B 12 deficiency     Cerebral microvascular disease     Mixed hyperlipidemia     Other hammer toe (acquired)     Vitamin D insufficiency     Incontinence of urine     Diabetic nephropathy with proteinuria (HCC)     Essential (primary) hypertension     History of type C viral hepatitis     Urinary incontinence due to cognitive impairment     History of seizures     Stented coronary artery-plan is to stay on Plavix indefinately per Dr Walker     Diabetes mellitus ([facility])     Controlled type 2 diabetes mellitus with diabetic neuropathy, with long-term current use of insulin ([facility])     Hemiparesis, left ([facility])     Angina, class II ([facility])     Pain, unspecified     Tardive dyskinesia     Shortness of breath     Poorly controlled diabetes mellitus ([facility])     Chronic diastolic congestive heart failure ([facility])     [place] (obstructive sleep apnea)     Pulmonary hypertension, unspecified ([facility])     Class 2 severe obesity with serious comorbidity and body mass index (BMI) of 36.0 to 36.9 in adult ([facility])     Edema     Closed supracondylar fracture of right humerus     Other chronic pain     Palliative care patient     Recurrent falls     Chronic renal failure     Difficulty in walking     ESRD (end stage renal disease) on dialysis ([facility])     Weakness     Other seizures ([facility])     Moderate persistent asthma without complication     COVID-19     Post PTCA     Falls frequently     Chest wall contusion, left, initial encounter     Headache, unspecified     Paranoid schizophrenia (HCC)     Compression fracture of spine ([facility])     Closed rib fracture     Depression    (HCC)     Acute decompensated heart failure (HCC)     Constipation     Chronic antibiotic suppression     Chronic infection of prosthetic knee (HCC)     Lack of intravenous access     Pressure injury, unstageable, with suspected deep tissue injury (HCC)     Hypogammaglobulinemia (HCC)     Adrenal insufficiency (HCC)     Open wound of left hand without foreign body     Other ascites     Cirrhosis of liver with ascites (HCC)     Thrombocytopenia (HCC)     Blood loss anemia     Hemorrhage of arteriovenous fistula (HCC)      Subjective:  Admit Date: 7/27/2024    Interval History: no responce    Medications:  Scheduled Meds:   meropenem  500 mg IntraVENous Q12H    hydrocortisone sodium succinate PF  100 mg IntraVENous Q8H    docusate  100 mg Oral BID    polyethylene glycol  17 g Oral Daily    vancomycin (VANCOCIN) intermittent dosing (placeholder)   Other RX Placeholder    lactulose  20 g Oral TID    miconazole   Topical BID    chlorhexidine  15 mL Mouth/Throat BID    insulin lispro  0-16 Units SubCUTAneous Q4H    pantoprazole (PROTONIX) 40 mg in sodium chloride (PF) 0.9 % 10 mL injection  40 mg IntraVENous Daily    sodium chloride flush  5-40 mL IntraVENous 2 times per day    sodium chloride flush  5-40 mL IntraVENous 2 times per day    Virt-Caps  1 capsule Oral Daily    Vitamin D  2,000 Units Oral Daily    [Held by provider] ARIPiprazole  5 mg Oral Daily    fludrocortisone  0.1 mg Oral BID    [Held by provider] sertraline  50 mg Oral Daily    miconazole   Topical BID    epoetin chadwick-epbx  8,000 Units SubCUTAneous Once per day on Mon Wed Fri    sodium chloride flush  5-40 mL IntraVENous 2 times per day    insulin glargine  35 Units SubCUTAneous Nightly    midodrine  20 mg Oral TID WC    [Held by provider] budesonide-formoterol  2 puff Inhalation BID RT    And    [Held by provider] tiotropium  2 puff Inhalation Daily RT    pill splitter   Does not apply Once     Continuous Infusions:   prismaSATE BGK 4/2.5 Stopped  clear to auscultation bilaterally  Heart: regular rate and rhythm, no murmurs or rubs  Abdomen: soft, non-tender, non-distended  Ext: no cyanosis, 2+ peripheral edema  Neuro: alert and oriented, no gross abnormalities  Psych: normal mood and affect  Skin: no rash      Electronically signed by Jaden Finch DO

## 2024-08-05 NOTE — PROGRESS NOTES
Physician Progress Note      PATIENT:               VELASQUEZ GARBER  CSN #:                  [id number]  :                       1960  ADMIT DATE:       2024 9:22 AM  DISCH DATE:  RESPONDING  PROVIDER #:        Say Marsh MD          QUERY TEXT:    Pt admitted with bleeding at AVF site. Pt developed PEA cardiac arrest during   IR fistulagram requiring intubation/mechanical ventilation. Acute respiratory   insufficiency noted per Dr. Marsh. Cardiology notes vent dependent   respiratory failure. If possible, please document in progress notes and   discharge summary if you are evaluation and/or treating any of the following:    The medical record reflects the following:  Risk Factors: PEA cardiac arrest  Clinical Indicators:  Dr. Mir intubation note \"Indications:   respiratory distress and airway protection\"  Significant even note \"She   was noted to be hypoxic and hypotensive.\" \"ABG showed normal pH, pCO2 in the   30s and PaO2 around 300\".   Dr. Marsh \"Acute respiratory insufficiency\" -2 Dr. Howell \"Vent   dependent respiratory failure\".  24 1442 ABGs showed pH 7.309 pCO2 32 pO2 302 HCO3 16 on 100% O2 vent.  Treatment: mechanical ventilation, ABG, ICU monitoring    Vijaya Fisher RN BSN [facility]  [phone]  Options provided:  -- Intubated for Acute Respiratory Failure as evidenced by, Please document   evidence.  -- Intubated for respiratory arrest and airway protection, Respiratory failure   ruled out after study  -- Intubated for respiratory insufficiency and airway protection, Respiratory   Failure ruled out after study  -- Other - I will add my own diagnosis  -- Disagree - Not applicable / Not valid  -- Disagree - Clinically unable to determine / Unknown  -- Refer to Clinical Documentation Reviewer    PROVIDER RESPONSE TEXT:    Intubated for respiratory arrest and airway protection, Respiratory failure   ruled out after study    Query created by: Jed Fisher on  8/2/2024 10:24 AM      Electronically signed by:  Say Marsh MD 8/5/2024 7:49 AM

## 2024-08-05 NOTE — PROGRESS NOTES
Hospitalist Progress Note      PCP: Adilene Terry MD    Date of Admission: 7/27/2024    Chief Complaint:  remains intubated, remains unresponsive off sedation per nursing staff, hypotensive requiring Norepinephrine support. Updated family on POC and poor prognosis     Medications:  Reviewed    Infusion Medications    prismaSATE BGK 4/2.5 Stopped (08/04/24 1430)    prismaSATE BGK 4/2.5 Stopped (08/04/24 1430)    prismaSATE BGK 4/2.5 Stopped (08/04/24 1430)    VASOpressin 20 Units in sodium chloride 0.9 % 100 mL infusion 0.02 Units/min (08/05/24 0748)    norepinephrine 7 mcg/min (08/04/24 2350)    sodium chloride      sodium chloride      sodium chloride      dextrose       Scheduled Medications    lactulose enema   Rectal Once    hydrocortisone sodium succinate PF  100 mg IntraVENous Q12H    Followed by    [START ON 8/7/2024] hydrocortisone sodium succinate PF  50 mg IntraVENous Q12H    Followed by    [START ON 8/9/2024] hydrocortisone sodium succinate PF  50 mg IntraVENous Daily    ALTEplase  2 mg IntraCATHeter Once    meropenem  500 mg IntraVENous Q12H    docusate  100 mg Oral BID    polyethylene glycol  17 g Oral Daily    vancomycin (VANCOCIN) intermittent dosing (placeholder)   Other RX Placeholder    lactulose  20 g Oral TID    miconazole   Topical BID    chlorhexidine  15 mL Mouth/Throat BID    insulin lispro  0-16 Units SubCUTAneous Q4H    pantoprazole (PROTONIX) 40 mg in sodium chloride (PF) 0.9 % 10 mL injection  40 mg IntraVENous Daily    sodium chloride flush  5-40 mL IntraVENous 2 times per day    sodium chloride flush  5-40 mL IntraVENous 2 times per day    Virt-Caps  1 capsule Oral Daily    Vitamin D  2,000 Units Oral Daily    [Held by provider] ARIPiprazole  5 mg Oral Daily    fludrocortisone  0.1 mg Oral BID    [Held by provider] sertraline  50 mg Oral Daily    miconazole   Topical BID    epoetin chadwick-epbx  8,000 Units SubCUTAneous Once per day on Mon Wed Fri    sodium chloride flush  5-40 mL  08/02/24  1202   AST NOT DONE   ALT NOT DONE   BILIDIR NOT DONE   BILITOT 2.4*   ALKPHOS 108       Recent Labs     08/02/24  1202   INR 1.6       No results for input(s): \"CKTOTAL\", \"TROPONINI\" in the last 72 hours.    Urinalysis:      Lab Results   Component Value Date/Time    NITRU Negative 10/06/2022 11:15 PM    WBCUA >100 10/06/2022 11:15 PM    BACTERIA MANY 10/06/2022 11:15 PM    RBCUA 10-20 10/06/2022 11:15 PM    BLOODU SMALL 10/06/2022 11:15 PM    SPECGRAV 1.030 09/17/2021 12:49 PM    GLUCOSEU 250 10/06/2022 11:15 PM       Radiology:  CT HEAD WO CONTRAST   Final Result   Stable exam.  No intracranial hemorrhage or acute intracranial disease.         CT ABDOMEN PELVIS WO CONTRAST Additional Contrast? None   Final Result   1. There is a large volume of ascites within the abdomen and pelvis.   2. Cirrhotic appearing liver.   3. Cholelithiasis.   4. Bilateral pleural effusions with bibasilar consolidative airspace disease   concerning for pneumonia and/or atelectasis.         Vascular duplex hemodialysis access left   Final Result   Thrombosed fistula with complete occlusion at the venous anastomosis.  Due to   deterioration patient's condition the procedure was aborted and not completed.         IR FISTULAGRAM   Final Result   Thrombosed fistula with complete occlusion at the venous anastomosis.  Due to   deterioration patient's condition the procedure was aborted and not completed.         IR THROMBECTOMY AVF PERC   Final Result   Thrombosed fistula with complete occlusion at the venous anastomosis.  Due to   deterioration patient's condition the procedure was aborted and not completed.         XR CHEST PORTABLE   Final Result   1. Endotracheal tube 24.8 cm above the tabitha.   2. Left central venous catheter tip in the expected location of the left   brachiocephalic vein.   3. Cardiomegaly with pulmonary vascular congestion.         CT HEAD WO CONTRAST   Final Result   No acute intracranial abnormality.          XR CHEST (SINGLE VIEW FRONTAL)   Final Result   1. Left neck central venous catheter, tip in the expected location of the   brachiocephalic/SVC junction.   2. Cardiomegaly with mild pulmonary venous congestion.   3. Possible small right pleural effusion.         FLUORO FOR SURGICAL PROCEDURES   Final Result   Intraprocedural fluoroscopic spot images as above.  See separate procedure   report for more information.         CT ABDOMEN PELVIS WO CONTRAST Additional Contrast? None   Final Result   1. Large volume ascites similar and unchanged when compared to the prior   examination.   2. Small bilateral pleural effusions.   3. Cardiomegaly with mild interstitial edema at the lung bases.   4. Cholelithiasis.   5. Anasarca seen throughout the soft tissues similar and unchanged when   compared to the prior examination.         IR PTA VENOUS PERC    (Results Pending)           Assessment/Plan:    63 y/o female with history of CAD s/p CABG/PCI, s/p TV repair, PAF/AFL, CVA with left sided weakness, IDDM2, ESRD on HD, anemia, schizophrenia, COVID-19 pneumonia, obesity, T11 fracture, hypogammaglobulinemia, E.Faecalis BSI in 7/2022, chronic left knee PJI/OM on chronic suppressive therapy with Amoxicillin, recently managed at [facility] from 3/29-4/12 for cardiac arrest s/p ROSC, acute / chronic hypotension due to septic shock and adrenal insufficiency and 4/16-5/1 for acute blood loss anemia due GI bleed in the setting of severely elevated INR , shock,  abdominal distention requiring large volume paracentesis, who presented with :      PEA cardiac arrest s/p ROSC  -  while having fistulogram done by IR on 7/31  - ROSC was obtained after 1 round of CPR and Epinephrine x1  - requiring intubation and mechanical ventilation  - completed targeted temperature  protocol   - TTE showed  LVEF of 55 - 60%, DD, mild MR/AR/TR, RVSP of 42 mmHg, severely dilated LA   - remains hypotensive requiring Norepinephrine/Vasopressin infusion,

## 2024-08-05 NOTE — PROGRESS NOTES
CVVH started without difficulty.  Left femoral dialysis line with good push and pull.  Treatment running without problems at this time.

## 2024-08-05 NOTE — CARE COORDINATION
Quality round completed with care management team. Pt on vent. Family at bedside. Pending Neuro Prognosis and family decision.   Possible restarting CRRT.     DC plan: TBD: on vent.       LSW will follow.     Electronically signed by HIPOLITO Rendon on 8/5/2024 at 10:51 AM

## 2024-08-05 NOTE — PROGRESS NOTES
Comprehensive Nutrition Assessment    Type and Reason for Visit:  Reassess    Nutrition Recommendations/Plan:   Resume tube feeding  when medically appropriate  Peptide based tube feeding ( Vital 1.2) @ 20 ml/hr via OG with water flushes 50 ml every 8 hrs  Continue to monitor medical plan of care     Malnutrition Assessment:  Malnutrition Status:  At risk for malnutrition (Comment) (08/01/24 1102)    Context:  Acute Illness     Findings of the 6 clinical characteristics of malnutrition:  Energy Intake:  50% or less of estimated energy requirements for 5 or more days  Weight Loss:  Unable to assess     Body Fat Loss:  No significant body fat loss     Muscle Mass Loss:  Unable to assess    Fluid Accumulation:  Moderate to Severe Generalized   Strength:  Not Performed    Nutrition Assessment:    No progress towards nutriton related goals, pt remains critically ill, unresponsive on vent, family awaiting EEG to determine goals of care. Tube feeding off at present for possible dialysis catheter palcement, will continue to monitor    Nutrition Related Findings:    \" hx includes :dm2, copd, ESRD (anuric), cirrhosis.. requires large-volume paracentesis as needed ( 7/8/24 = 7600 ml removed), required intubation ( 7/31),after cardiac arrest; OG placed,.trophic tube feeding ( 8/1), no sedation at present, Levophed @ 6 ml/hr + vasopressin @ 6 ml/hr,  + weeping generalzied anasarca reported by nursing, did recieve CRRT, needs new access placed, last BM 7/29 ( on colace, glycolax lactulose ( 8/2); lactulose enema ordered ( 8/5), Abnormal labs noted ( +hyperkalemia, elevated BUN/creat, glucose acceptable) Wound Type: None       Current Nutrition Intake & Therapies:    Average Meal Intake: NPO  Average Supplements Intake: NPO  No diet orders on file    Anthropometric Measures:  Height: 172.7 cm (5' 8\")  Ideal Body Weight (IBW): 140 lbs (64 kg)    Admission Body Weight: 89.4 kg (197 lb)  Current Body Weight: 98.4 kg (217 lb) (*  edema), 152.1 % IBW. Weight Source: Bed Scale  Current BMI (kg/m2): 33  Usual Body Weight: 98.4 kg (217 lb) (( 2/2024), wt varies 190-220's dependent on fluid status)  % Weight Change (Calculated): -1.8                    BMI Categories: Obese Class 1 (BMI 30.0-34.9)    Estimated Daily Nutrient Needs:  Energy Requirements Based On: Kcal/kg  Weight Used for Energy Requirements: Admission  Energy (kcal/day): 3819-4348 kcals @ 20-22 kcal/kg  Weight Used for Protein Requirements: Ideal  Protein (g/day): ~ 96 g protein  Method Used for Fluid Requirements: ml/Kg  Fluid (ml/day): ~1600 ml @ 25 ml/kg IBW- or per physician    Nutrition Diagnosis:   Inadequate oral intake related to impaired respiratory function as evidenced by intubation  Altered nutrition-related lab values related to endocrine dysfuntion, renal dysfunction as evidenced by lab values    Nutrition Interventions:   Food and/or Nutrient Delivery: Start Tube Feeding  Nutrition Education/Counseling: Education not indicated  Coordination of Nutrition Care: Continue to monitor while inpatient       Goals:  Previous Goal Met: No Progress toward Goal(s)  Goals: Initiate nutrition support, by next RD assessment       Nutrition Monitoring and Evaluation:   Behavioral-Environmental Outcomes: None Identified  Food/Nutrient Intake Outcomes: Diet Advancement/Tolerance, Food and Nutrient Intake, Enteral Nutrition Intake/Tolerance  Physical Signs/Symptoms Outcomes: Fluid Status or Edema, GI Status, Biochemical Data    Discharge Planning:    Too soon to determine     NATHAN FUNG RD, LD

## 2024-08-05 NOTE — PROGRESS NOTES
the patient's medical record by the nurse. Mallapati score 2 ASA 3 FLUOROSCOPY DOSE AND TYPE: Radiation Exposure Index: 25.1 mGy, fluoro time 3.4 minutes DESCRIPTION OF PROCEDURE: Informed consent was obtained after a detailed explanation of the procedure including risks, benefits, and alternatives.  Universal protocol was observed. Sterile gowns, masks, hats and gloves utilized for maximal sterile barrier. When patient presented to angio suite she was actively bleeding for what appeared to be ulcerated wound overlying the fistula with bleeding that was sometimes pulsatile in nature.  The left arm was quickly prepped in sterile fashion using chlorhexidine maximum sterile barrier.  1% lidocaine was infiltrated around the bleeding wound as pressure was held on the fistula to decrease bleeding.  A 2-0 prolene suture was then placed in a pursestring fashion around the bleeding wound and hemostasis was achieved. Next ultrasound evaluation of the fistula was performed showing occlusion near the venous anastomosis.  No pseudoaneurysm was seen.  1% lidocaine was infiltrated and micropuncture access in antegrade direction was obtained in the mid fistula.  Micro sheath was inserted.  Through this a fistulogram was performed showing complete occlusion of the fistula at the venous anastomosis.  A 0.35 guidewire was then advanced over which a 6 Spanish sheath was inserted.  Guidewire was able to be advanced through the occlusion into the subclavian vein.  Over wire through the sheath a 7 mm balloon was advanced and used to angioplasty the venous anastomosis where she had previous a stenosis.  During the angioplasty it was noted that the patient has O2 saturation, blood pressure and heart rate began to decrease.  The procedure was halted and a cold was immediately called.  CPR was initiated, epinephrine was given patient was connected to defibrillator and then intubated by ICU physician.  Sheath in the arm was removed and  present with increased LAP with normal LV EF.    Aortic Valve: Moderately thickened cusps. Moderate sclerosis of the aortic valve cusps. Mild regurgitation with a centrally directed jet.    Mitral Valve: Moderately thickened leaflets. Mild regurgitation with a centrally directed jet. Mild stenosis noted.    Tricuspid Valve: Mild regurgitation with a centrally directed jet. Mildly elevated RVSP, consistent with mild pulmonary hypertension. The estimated RVSP is 42 mmHg.    Left Atrium: Left atrium is severely dilated.    Image quality is adequate. Technically difficult study due to patient's body habitus and procedure performed with the patient in a supine position.        No results found for this or any previous visit from the past 365 days.              ASSESSMENT:     Vent dependent respiratory failure  Hypovolemic shock.  From hemodialysis access site bleeding/acute blood loss  Status post brief PEA arrest  EF of 55 to 60% by echo on 8/1/2024  History of CAD status post PCI to LAD, history of CABG with LIMA to the LAD as well as tricuspid valve repair.  Surgery complicated by tamponade status post pericardial window  End-stage renal disease hemodialysis dependent  Hypertension  Hyperlipidemia  History of paroxysmal atrial fibrillation-normal sinus rhythm on telemetry  EF of 55 to 60% by echo on 7/31/2024  Mild pulmonary hypertension RVSP of 42 mm pete  Mild mitral stenosis  Ascites  Diabetes mellitus     PLAN:   As always, aggressive risk factor modification is strongly recommended. We should adhere to the JNC VIII guidelines for HTN management and the NCEPATP III guidelines for LDL-C management.  ICU supportive care and management  Wean vent off as tolerated.   Maximize cardiac medications  Wean pressors off as tolerated  No anticoagulation for now given acute blood loss anemia/hemodialysis access site bleed.  Resume when okay with team, hemoglobin is stable  Continue to monitor on telemetry  GI/DVT  prophylaxis  Continue test between 4-5 magnesium greater than 2  Nephrology recommendations  Prognosis is very poor     Thank you for allowing me to participate in the care of your patient, please don't hesitate to contact me if you have any further questions.

## 2024-08-05 NOTE — PROGRESS NOTES
Neurology Follow up    SUBJECTIVE: Intubated unresponsive not sedated.  Daughter at the bedside.  Current Facility-Administered Medications   Medication Dose Route Frequency Provider Last Rate Last Admin    prismaSATE BGK 4/2.5 dialysis solution   Dialysis Continuous Jaden Finch DO   Stopped at 08/04/24 1430    prismaSATE BGK 4/2.5 dialysis solution   Dialysis Continuous Jaden Finch DO   Stopped at 08/04/24 1430    prismaSATE BGK 4/2.5 dialysis solution   Dialysis Continuous Jaden Finch DO   Stopped at 08/04/24 1430    sodium chloride flush 0.9 % injection 1.1-1.9 mL  1.1-1.9 mL IntraCATHeter PRN Jaden Finch DO        And    sodium chloride flush 0.9 % injection 1.1-1.9 mL  1.1-1.9 mL IntraCATHeter PRN Jaden Finch DO        meropenem (MERREM) 500 mg in sodium chloride 0.9 % 100 mL IVPB (mini-bag)  500 mg IntraVENous Q12H Say Marsh MD   Stopped at 08/05/24 0250    heparin (porcine) injection 2,500 Units  2,500 Units IntraCATHeter PRN Say Marsh MD   2,500 Units at 08/04/24 1655    hydrocortisone sodium succinate PF (SOLU-CORTEF) injection 100 mg  100 mg IntraVENous Q8H Say Marsh MD   100 mg at 08/05/24 0246    VASOpressin 20 Units in sodium chloride 0.9 % 100 mL infusion  0.01-0.03 Units/min IntraVENous Continuous Say Marsh MD 6 mL/hr at 08/05/24 0748 0.02 Units/min at 08/05/24 0748    norepinephrine (LEVOPHED) 16 mg in sodium chloride 0.9 % 250 mL infusion  1-100 mcg/min IntraVENous Continuous Milagros Hanna APRN - CNP 6.6 mL/hr at 08/04/24 2350 7 mcg/min at 08/04/24 2350    docusate (COLACE) 50 MG/5ML liquid 100 mg  100 mg Oral BID Milagros Hanna APRN - CNP   100 mg at 08/04/24 0730    polyethylene glycol (GLYCOLAX) packet 17 g  17 g Oral Daily Milagros Hanna APRN - CNP   17 g at 08/04/24 0730    perflutren lipid microspheres (DEFINITY) injection 1.5 mL  1.5 mL IntraVENous ONCE PRN Milagros Hanna APRN - CNP        vancomycin (VANCOCIN) intermittent  MG/3ML) 0.083% nebulizer solution 2.5 mg  2.5 mg Nebulization Q4H PRN Joss Denise MD        Virt-Caps 1 mg  1 capsule Oral Daily Joss Denise MD   1 mg at 08/04/24 0730    Vitamin D (CHOLECALCIFEROL) tablet 2,000 Units  2,000 Units Oral Daily Joss Denise MD   2,000 Units at 08/05/24 0833    [Held by provider] ARIPiprazole (ABILIFY) tablet 5 mg  5 mg Oral Daily Joss Denise MD   5 mg at 08/03/24 0911    fludrocortisone (FLORINEF) tablet 0.1 mg  0.1 mg Oral BID Joss Denise MD   0.1 mg at 08/05/24 0833    [Held by provider] sertraline (ZOLOFT) tablet 50 mg  50 mg Oral Daily Joss Denise MD   50 mg at 08/03/24 0902    miconazole (MICOTIN) 2 % powder   Topical BID Joss Denise MD   Given at 08/05/24 0834    epoetin chadwick-epbx (RETACRIT) injection 8,000 Units  8,000 Units SubCUTAneous Once per day on Mon Wed Fri Joss Denise MD   8,000 Units at 08/02/24 1559    melatonin disintegrating tablet 5 mg  5 mg Oral Nightly PRN Joss Denise MD   5 mg at 07/30/24 2028    sodium chloride flush 0.9 % injection 5-40 mL  5-40 mL IntraVENous 2 times per day Joss Denise MD   10 mL at 08/04/24 2120    sodium chloride flush 0.9 % injection 5-40 mL  5-40 mL IntraVENous PRN Joss Denise MD        0.9 % sodium chloride infusion   IntraVENous PRN Joss Denise MD        ondansetron (ZOFRAN-ODT) disintegrating tablet 4 mg  4 mg Oral Q8H PRN Joss Denise MD        Or    ondansetron (ZOFRAN) injection 4 mg  4 mg IntraVENous Q6H PRN Joss Denise MD   4 mg at 07/31/24 0807    polyethylene glycol (GLYCOLAX) packet 17 g  17 g Oral Daily PRN Joss Denise MD        acetaminophen (TYLENOL) tablet 650 mg  650 mg Oral Q6H PRN Joss Denise MD   650 mg at 07/31/24 0041    Or    acetaminophen (TYLENOL) suppository 650 mg  650 mg Rectal Q6H PRN Joss Denise MD        glucose chewable tablet 16 g  4 tablet  extensive surgical data and extensive discussion with the daughter.    Darren Guadarrama MD, FASAUL  Diplomate, American Board of Psychiatry & Neurology  Board Certified in Vascular Neurology  Board Certified in Neuromuscular Medicine  Certified in Neurorehabilitation

## 2024-08-05 NOTE — CONSULTS
Infectious Diseases Inpatient Consult Note      Reason for Consult:   Septic shock  Requesting Physician:   SHELLEY Hanna CNP  Primary Care Physician:  Adilene Terry MD  History Obtained From:   Pt, EPIC    Admit Date: 7/27/2024  Hospital Day: 10      HISTORY OF PRESENT ILLNESS:  This is a 64 y.o. female who is familiar to me from previous hospitalization with end-stage renal disease on hemodialysis, diabetes mellitus 2, liver cirrhosis, chronic osteomyelitis of left knee with prosthetic joint infection on chronic suppressive therapy with oral amoxicillin, coronary artery disease, CHF, COPD, seizures, hypertension, hyperlipidemia, was admitted on July 27 with hypotension and active bleeding from left upper extremity AV fistula.  Patient was in shock on presentation.  Received multiple blood transfusions during this hospitalization.  Went into cardiopulmonary arrest after she had a Kenyon catheter placed on July 30.  Currently in ICU intubated.  Has been off sedation for few days with lack of responsiveness.  Had EEG done with pending results.  Patient has left femoral catheter that clotted and currently on clotted with a plan to resume CRRT.  I was consulted because of a sputum culture showing Klebsiella.  Was started on IV vancomycin and meropenem 3 days ago.  Patient remains to be on Levophed at 11 mics.  Vasopressin was weaned off.  Patient is receiving lactulose enemas.  Has no endotracheal secretions being suctioned.  Had high residuals from tube feeding, currently n.p.o..       Past medical surgical and social history were reviewed and are as detailed in my note  Past Medical History:   Diagnosis Date    Angina at rest ([facility]) 5/24/2020    Anxiety     CAD S/P percutaneous coronary angioplasty 2015, 2018    stents per Huong Sanabria    CHF (congestive heart failure) ([facility])     CKD (chronic kidney disease) stage 4, GFR 15-29 ml/min ([facility]) 2/24/2018    CKD stage 4 due to type 2 diabetes mellitus ([facility])         HGB 8.3 (L) 08/05/2024    HCT 24.7 (L) 08/05/2024    MCV 89.5 08/05/2024    PLT 87 (L) 08/05/2024     Lab Results   Component Value Date    CREATININE 4.71 (H) 08/05/2024    BUN 50 (H) 08/05/2024     08/05/2024    K 5.5 (H) 08/05/2024     08/05/2024    CO2 14 (L) 08/05/2024       Hepatic Function Panel:   Lab Results   Component Value Date/Time    ALKPHOS 108 08/02/2024 12:02 PM    ALT NOT DONE 08/02/2024 12:02 PM    AST NOT DONE 08/02/2024 12:02 PM    BILITOT 2.4 08/02/2024 12:02 PM    BILIDIR NOT DONE 08/02/2024 12:02 PM    IBILI 0.9 08/02/2024 12:02 PM       Microbiology:   Recent Labs     08/03/24  1029   BC No Growth to date.  Any change in status will be called.     Recent Labs     08/03/24  1040   BLOODCULT2 No Growth to date.  Any change in status will be called.     No results for input(s): \"LABURIN\" in the last 72 hours.  No results for input(s): \"WNDABS\" in the last 72 hours.  Recent Labs     08/03/24  1633   CULTRESP Direct Exam:  >25 NEUTROPHILS/LPF  Direct Exam:  < 10 EPITHELIAL CELLS/LPF  Direct Exam:  PREDOMINANT ORGANISM: GRAM NEGATIVE COCCOBACILLI  Direct Exam:  MIXED BACTERIAL MORPHOTYPES ALSO PRESENT ON GRAM STAIN.  Performed at [address] [address]  ([phone]  *  LIGHT GROWTH  Identification by MALDI-TOF       CT of abdomen and pelvis  Large volume of ascites with cirrhotic liver  Cholelithiasis, bilateral pleural effusion and bibasilar consolidative airspace disease  Sputum culture with Klebsiella pneumonia and pending susceptibility  IMPRESSION:    Septic and hypovolemic shock with hypovolemic shock related to active bleeding from left upper extremity AV fistula, he remains to be on Levophed  Gram-negative pneumonia status post cardiopulmonary arrest and resuscitation, currently intubated on assist-control 40%  Acute encephalopathy status postcardiac arrest, probably hypoxic  Chronic antibiotic suppressive therapy on p.o. amoxicillin for left  knee prosthetic joint infection and chronic osteomyelitis with Enterococcus  End-stage renal disease on hemodialysis and hypogammaglobulinemia    Patient Active Problem List   Diagnosis    Coronary artery disease involving native coronary artery of native heart with angina pectoris ([facility])    Schizophrenia, paranoid, chronic    Metabolic syndrome    Vitamin B 12 deficiency    Cerebral microvascular disease    Mixed hyperlipidemia    Other hammer toe (acquired)    Vitamin D insufficiency    Incontinence of urine    Diabetic nephropathy with proteinuria ([facility])    Essential (primary) hypertension    History of type C viral hepatitis    Urinary incontinence due to cognitive impairment    History of seizures    Stented coronary artery-plan is to stay on Plavix indefinately per Dr Walker    Diabetes mellitus ([facility])    Controlled type 2 diabetes mellitus with diabetic neuropathy, with long-term current use of insulin ([facility])    Hemiparesis, left ([facility])    Angina, class II ([facility])    Pain, unspecified    Tardive dyskinesia    Shortness of breath    Poorly controlled diabetes mellitus ([facility])    Chronic diastolic congestive heart failure ([facility])    [place] (obstructive sleep apnea)    Pulmonary hypertension, unspecified ([facility])    Class 2 severe obesity with serious comorbidity and body mass index (BMI) of 36.0 to 36.9 in adult ([facility])    Edema    Closed supracondylar fracture of right humerus    Other chronic pain    Palliative care patient    Recurrent falls    Chronic renal failure    Difficulty in walking    ESRD (end stage renal disease) on dialysis ([facility])    Weakness    Other seizures ([facility])    Moderate persistent asthma without complication    COVID-19    Post PTCA    Falls frequently    Chest wall contusion, left, initial encounter    Headache, unspecified    Paranoid schizophrenia ([facility])    Compression fracture of spine ([facility])    Closed rib fracture    Depression    Chronic obstructive pulmonary disease ([facility])    Critical illness

## 2024-08-05 NOTE — PROGRESS NOTES
0500AM  Received from Stephani HUDDLESTON Intubated Levo Vasopressin infusing vai gomez. Skin weeping. Nonresponsive.  Trialysis in L groin. Og to low suction. Skin weeping. Anuric Repositioned to L alanis. R radial art line  0800am Remains unresponsive. VSS at this time. MP-SR. Levo @15meqs Vasopressin @0.02. anuric. Bilateral arms weeping. L upper chest Gomez. AV Fistula L upper arm no thrill bruit. Vented OG to low suction.  0900AM DR Howell in and updated on status  09:45am Critical care rounds with Dr Marsh and team. Family at bedside. Dr Guadarrama in to speak with family  10:30am Dr Rai in and spoke with Patients daughter at bedside  10:55am Dr Lynn in and updated on status  12 noon Remains unresponsive VSS Levo 15 meqs.  R arm remains weeping  1500pm Lactulose enema given per order. Joe NP updated on status. R radial A-line dressing changed. Site with out redness ir or swelling. Hard stool removed with enema  1800pm Dialysis here to set up CRRT

## 2024-08-05 NOTE — PROGRESS NOTES
1900: Assumed care of pt at RN shift change after receiving bedside report from FLAQUITO Damico  Pt intubated, no sedation currently on. Vent settings reviewed and verified. IV medications verified. Pt in no acute distress noted at this time, side rails up x 2, call light within reach, and white board updated. This RN will be precepting Taiwo throughout shift     0445: report to FLAQUITO Victor

## 2024-08-05 NOTE — PROGRESS NOTES
Pulmonary & Critical Care Medicine ICU Progress Note  Chief complaint : Shock    Subjunctive/24 hour events :   Patient seen and examined during multidisciplinary rounds with RN, charge nurse, RT, pharmacy, dietitian, and social service.      Sedated on vent, she is on Levophed at 7 mcg/min and vasopressin 0.02 units/min, no fever, she is on 40% FiO2 saturation 96%.  She is off CRRT line did not work yesterday, she is +7.5 Lno bowel movement      New information updated in the note today, rest of the examination did not change compared to yesterday.  Social History     Tobacco Use    Smoking status: Never     Passive exposure: Never    Smokeless tobacco: Never   Substance Use Topics    Alcohol use: No     Alcohol/week: 0.0 standard drinks of alcohol         Problem Relation Age of Onset    Cancer Mother 68        survived    Breast Cancer Mother     Hypertension Father     Diabetes Sister     Mental Illness Sister     Colon Cancer Neg Hx        Recent Labs     08/03/24  0443 08/03/24  0802   PHART 7.196* 7.344*   HPA0QMI 51* 34*   PO2ART 147* 103         MV Settings:  Vent Mode: AC/VC Resp Rate (Set): 30 bpm/Vt (Set, mL): 350 mL/ /FiO2 : 40 %           IV:   prismaSATE BGK 4/2.5 Stopped (08/04/24 1430)    prismaSATE BGK 4/2.5 Stopped (08/04/24 1430)    prismaSATE BGK 4/2.5 Stopped (08/04/24 1430)    VASOpressin 20 Units in sodium chloride 0.9 % 100 mL infusion 0.02 Units/min (08/05/24 0748)    norepinephrine 7 mcg/min (08/04/24 2350)    sodium chloride      sodium chloride      sodium chloride      dextrose         Vitals:  BP (!) 110/53   Pulse 83   Temp 99.1 °F (37.3 °C) (Esophageal)   Resp (!) 31   Ht 1.727 m (5' 8\")   Wt 96.8 kg (213 lb 8 oz)   LMP  (LMP Unknown)   SpO2 96%   BMI 32.46 kg/m²    Tmax:        Intake/Output Summary (Last 24 hours) at 8/5/2024 0846  Last data filed at 8/4/2024 1851  Gross per 24 hour   Intake 580.2 ml   Output 400 ml   Net 180.2 ml         EXAM:  General: Unresponsive on  microspheres, perflutren lipid microspheres, ipratropium 0.5 mg-albuterol 2.5 mg, naloxone 0.4 mg in 10 mL sodium chloride syringe, sodium chloride flush, sodium chloride, fentanNYL, HYDROmorphone, sodium chloride flush, sodium chloride, albuterol, melatonin, sodium chloride flush, sodium chloride, ondansetron **OR** ondansetron, polyethylene glycol, acetaminophen **OR** acetaminophen, glucose, dextrose bolus **OR** dextrose bolus, glucagon (rDNA), dextrose    Results: reviewed by me   CBC:   Recent Labs     08/03/24  0500 08/03/24  0802 08/04/24  0552 08/05/24  0543   WBC 33.2*  --  23.1* 22.5*   HGB 10.5* 9.8* 9.0* 8.3*   HCT 29.9*  --  26.7* 24.7*   MCV 92.3  --  88.4 89.5   PLT 95*  --  100* 87*       BMP:   Recent Labs     08/03/24  0500 08/03/24  0802 08/04/24  0553 08/05/24  0539   *  --  133* 137   K 5.3*  --  5.3* 5.5*   CL 93*  --  99 102   CO2 17*  --  18* 14*   PHOS 6.1*  --  6.6* 7.1*   BUN 21  --  35* 50*   CREATININE 3.39* 3.6* 4.28* 4.71*       LIVER PROFILE:   Recent Labs     08/02/24  1202   AST NOT DONE   ALT NOT DONE   BILIDIR NOT DONE   BILITOT 2.4*   ALKPHOS 108       PT/INR:   Recent Labs     08/02/24  1202   PROTIME 19.6*   INR 1.6       APTT:   No results for input(s): \"APTT\" in the last 72 hours.    UA:No results for input(s): \"NITRITE\", \"COLORU\", \"PHUR\", \"LABCAST\", \"WBCUA\", \"RBCUA\", \"MUCUS\", \"TRICHOMONAS\", \"YEAST\", \"BACTERIA\", \"CLARITYU\", \"SPECGRAV\", \"LEUKOCYTESUR\", \"UROBILINOGEN\", \"BILIRUBINUR\", \"BLOODU\", \"GLUCOSEU\", \"AMORPHOUS\" in the last 72 hours.    Invalid input(s): \"KETONESU\"    Cultures:  Negative so far  Films:  CXR films reviewed by me and it showed no acute infiltrate    CT abdomen  IMPRESSION:  1. There is a large volume of ascites within the abdomen and pelvis.  2. Cirrhotic appearing liver.  3. Cholelithiasis.  4. Bilateral pleural effusions with bibasilar consolidative airspace disease  concerning for pneumonia and/or atelectasis.     CT head no acute

## 2024-08-06 LAB
ALBUMIN SERPL-MCNC: 3.3 G/DL (ref 3.5–4.6)
ANA PAT SER IF-IMP: NORMAL
ANION GAP SERPL CALCULATED.3IONS-SCNC: 18 MEQ/L (ref 9–15)
ANISOCYTOSIS BLD QL SMEAR: ABNORMAL
BACTERIA SPEC RESP CULT: ABNORMAL
BACTERIA SPEC RESP CULT: ABNORMAL
BASE EXCESS ARTERIAL: -1 (ref -3–3)
BASE EXCESS ARTERIAL: 0 (ref -3–3)
BASE EXCESS ARTERIAL: 1 (ref -3–3)
BASOPHILS # BLD: 0 K/UL (ref 0–0.2)
BASOPHILS NFR BLD: 0.1 %
BUN SERPL-MCNC: 38 MG/DL (ref 8–23)
BURR CELLS: ABNORMAL
CALCIUM IONIZED: 1.28 MMOL/L (ref 1.12–1.32)
CALCIUM IONIZED: 1.34 MMOL/L (ref 1.12–1.32)
CALCIUM IONIZED: 1.35 MMOL/L (ref 1.12–1.32)
CALCIUM SERPL-MCNC: 9.6 MG/DL (ref 8.5–9.9)
CHLORIDE SERPL-SCNC: 99 MEQ/L (ref 95–107)
CO2 SERPL-SCNC: 18 MEQ/L (ref 20–31)
CREAT SERPL-MCNC: 3.13 MG/DL (ref 0.5–0.9)
CRP SERPL HS-MCNC: 88.1 MG/L (ref 0–5)
EOSINOPHIL # BLD: 0 K/UL (ref 0–0.7)
EOSINOPHIL NFR BLD: 0 %
ERYTHROCYTE [DISTWIDTH] IN BLOOD BY AUTOMATED COUNT: 22.3 % (ref 11.5–14.5)
GLUCOSE BLD-MCNC: 129 MG/DL (ref 70–99)
GLUCOSE BLD-MCNC: 136 MG/DL (ref 70–99)
GLUCOSE BLD-MCNC: 136 MG/DL (ref 70–99)
GLUCOSE BLD-MCNC: 146 MG/DL (ref 70–99)
GLUCOSE BLD-MCNC: 154 MG/DL (ref 70–99)
GLUCOSE BLD-MCNC: 204 MG/DL (ref 70–99)
GLUCOSE SERPL-MCNC: 127 MG/DL (ref 70–99)
HCO3 ARTERIAL: 22.1 MMOL/L (ref 21–29)
HCO3 ARTERIAL: 22.9 MMOL/L (ref 21–29)
HCO3 ARTERIAL: 23.6 MMOL/L (ref 21–29)
HCT VFR BLD AUTO: 25 % (ref 36–48)
HCT VFR BLD AUTO: 26 % (ref 36–48)
HCT VFR BLD AUTO: 26 % (ref 36–48)
HCT VFR BLD AUTO: 26.6 % (ref 37–47)
HGB BLD CALC-MCNC: 8.6 GM/DL (ref 12–16)
HGB BLD CALC-MCNC: 8.7 GM/DL (ref 12–16)
HGB BLD CALC-MCNC: 8.8 GM/DL (ref 12–16)
HGB BLD-MCNC: 8.9 G/DL (ref 12–16)
LACTATE: 0.96 MMOL/L (ref 0.4–2)
LACTATE: 1 MMOL/L (ref 0.4–2)
LACTATE: 1.06 MMOL/L (ref 0.4–2)
LYMPHOCYTES # BLD: 1.2 K/UL (ref 1–4.8)
LYMPHOCYTES NFR BLD: 5.3 %
MCH RBC QN AUTO: 29.3 PG (ref 27–31.3)
MCHC RBC AUTO-ENTMCNC: 33.5 % (ref 33–37)
MCV RBC AUTO: 87.5 FL (ref 79.4–94.8)
MONOCYTES # BLD: 1.2 K/UL (ref 0.2–0.8)
MONOCYTES NFR BLD: 5.3 %
NEUTROPHILS # BLD: 20.1 K/UL (ref 1.4–6.5)
NEUTS SEG NFR BLD: 87.2 %
NUCLEAR IGG SER QL IF: NORMAL
O2 SAT, ARTERIAL: 100 % (ref 93–100)
ORGANISM: ABNORMAL
OVALOCYTES BLD QL SMEAR: ABNORMAL
PCO2 ARTERIAL: 24 MM HG (ref 35–45)
PCO2 ARTERIAL: 29 MM HG (ref 35–45)
PCO2 ARTERIAL: 34 MM HG (ref 35–45)
PERFORMED ON: ABNORMAL
PH ARTERIAL: 7.43 (ref 7.35–7.45)
PH ARTERIAL: 7.52 (ref 7.35–7.45)
PH ARTERIAL: 7.58 (ref 7.35–7.45)
PHOSPHATE SERPL-MCNC: 4.1 MG/DL (ref 2.3–4.8)
PLATELET # BLD AUTO: 78 K/UL (ref 130–400)
PLATELET BLD QL SMEAR: ABNORMAL
PO2 ARTERIAL: 168 MM HG (ref 75–108)
PO2 ARTERIAL: 178 MM HG (ref 75–108)
PO2 ARTERIAL: 185 MM HG (ref 75–108)
POC CHLORIDE: 105 MEQ/L (ref 99–110)
POC CHLORIDE: 105 MEQ/L (ref 99–110)
POC CHLORIDE: 107 MEQ/L (ref 99–110)
POC CREATININE: 3 MG/DL (ref 0.6–1.2)
POC CREATININE: 3 MG/DL (ref 0.6–1.2)
POC CREATININE: 3.2 MG/DL (ref 0.6–1.2)
POC FIO2: 40
POC SAMPLE TYPE: ABNORMAL
POIKILOCYTOSIS BLD QL SMEAR: ABNORMAL
POLYCHROMASIA BLD QL SMEAR: ABNORMAL
POTASSIUM SERPL-SCNC: 4.6 MEQ/L (ref 3.5–5.1)
POTASSIUM SERPL-SCNC: 4.8 MEQ/L (ref 3.4–4.9)
POTASSIUM SERPL-SCNC: 4.8 MEQ/L (ref 3.5–5.1)
POTASSIUM SERPL-SCNC: 4.9 MEQ/L (ref 3.5–5.1)
PROCALCITONIN SERPL IA-MCNC: 1.78 NG/ML (ref 0–0.15)
RBC # BLD AUTO: 3.04 M/UL (ref 4.2–5.4)
SODIUM BLD-SCNC: 137 MEQ/L (ref 136–145)
SODIUM BLD-SCNC: 138 MEQ/L (ref 136–145)
SODIUM BLD-SCNC: 139 MEQ/L (ref 136–145)
SODIUM SERPL-SCNC: 135 MEQ/L (ref 135–144)
TCO2 ARTERIAL: 23 MMOL/L (ref 21–32)
TCO2 ARTERIAL: 24 MMOL/L (ref 21–32)
TCO2 ARTERIAL: 24 MMOL/L (ref 21–32)
VANCOMYCIN SERPL-MCNC: 17.2 UG/ML (ref 10–40)
WBC # BLD AUTO: 23 K/UL (ref 4.8–10.8)

## 2024-08-06 PROCEDURE — 6370000000 HC RX 637 (ALT 250 FOR IP): Performed by: COLON & RECTAL SURGERY

## 2024-08-06 PROCEDURE — 83605 ASSAY OF LACTIC ACID: CPT

## 2024-08-06 PROCEDURE — 6360000002 HC RX W HCPCS: Performed by: NURSE PRACTITIONER

## 2024-08-06 PROCEDURE — 82565 ASSAY OF CREATININE: CPT

## 2024-08-06 PROCEDURE — 2580000003 HC RX 258: Performed by: INTERNAL MEDICINE

## 2024-08-06 PROCEDURE — 6370000000 HC RX 637 (ALT 250 FOR IP): Performed by: INTERNAL MEDICINE

## 2024-08-06 PROCEDURE — 82330 ASSAY OF CALCIUM: CPT

## 2024-08-06 PROCEDURE — 2000000000 HC ICU R&B

## 2024-08-06 PROCEDURE — 6370000000 HC RX 637 (ALT 250 FOR IP): Performed by: NURSE PRACTITIONER

## 2024-08-06 PROCEDURE — 99232 SBSQ HOSP IP/OBS MODERATE 35: CPT | Performed by: PSYCHIATRY & NEUROLOGY

## 2024-08-06 PROCEDURE — 85014 HEMATOCRIT: CPT

## 2024-08-06 PROCEDURE — 6360000002 HC RX W HCPCS: Performed by: INTERNAL MEDICINE

## 2024-08-06 PROCEDURE — 2580000003 HC RX 258: Performed by: ANESTHESIOLOGY

## 2024-08-06 PROCEDURE — 2580000003 HC RX 258: Performed by: NURSE PRACTITIONER

## 2024-08-06 PROCEDURE — 86140 C-REACTIVE PROTEIN: CPT

## 2024-08-06 PROCEDURE — 2700000000 HC OXYGEN THERAPY PER DAY

## 2024-08-06 PROCEDURE — 37799 UNLISTED PX VASCULAR SURGERY: CPT

## 2024-08-06 PROCEDURE — 94003 VENT MGMT INPAT SUBQ DAY: CPT

## 2024-08-06 PROCEDURE — 84295 ASSAY OF SERUM SODIUM: CPT

## 2024-08-06 PROCEDURE — 84132 ASSAY OF SERUM POTASSIUM: CPT

## 2024-08-06 PROCEDURE — 80202 ASSAY OF VANCOMYCIN: CPT

## 2024-08-06 PROCEDURE — 85025 COMPLETE CBC W/AUTO DIFF WBC: CPT

## 2024-08-06 PROCEDURE — 84145 PROCALCITONIN (PCT): CPT

## 2024-08-06 PROCEDURE — 36600 WITHDRAWAL OF ARTERIAL BLOOD: CPT

## 2024-08-06 PROCEDURE — 82948 REAGENT STRIP/BLOOD GLUCOSE: CPT

## 2024-08-06 PROCEDURE — 2500000003 HC RX 250 WO HCPCS: Performed by: NURSE PRACTITIONER

## 2024-08-06 PROCEDURE — 82803 BLOOD GASES ANY COMBINATION: CPT

## 2024-08-06 PROCEDURE — 99291 CRITICAL CARE FIRST HOUR: CPT | Performed by: INTERNAL MEDICINE

## 2024-08-06 PROCEDURE — 80069 RENAL FUNCTION PANEL: CPT

## 2024-08-06 PROCEDURE — 99233 SBSQ HOSP IP/OBS HIGH 50: CPT | Performed by: INTERNAL MEDICINE

## 2024-08-06 PROCEDURE — 2580000003 HC RX 258: Performed by: COLON & RECTAL SURGERY

## 2024-08-06 PROCEDURE — 82435 ASSAY OF BLOOD CHLORIDE: CPT

## 2024-08-06 RX ADMIN — FLUDROCORTISONE ACETATE 0.1 MG: 0.1 TABLET ORAL at 08:21

## 2024-08-06 RX ADMIN — Medication 1000 ML/HR: at 05:09

## 2024-08-06 RX ADMIN — Medication 10 ML: at 09:00

## 2024-08-06 RX ADMIN — FLUDROCORTISONE ACETATE 0.1 MG: 0.1 TABLET ORAL at 20:25

## 2024-08-06 RX ADMIN — CHLORHEXIDINE GLUCONATE 15 ML: 1.2 RINSE ORAL at 07:42

## 2024-08-06 RX ADMIN — LACTULOSE 20 G: 20 SOLUTION ORAL at 20:25

## 2024-08-06 RX ADMIN — Medication 10 ML: at 20:26

## 2024-08-06 RX ADMIN — HYDROCORTISONE SODIUM SUCCINATE 100 MG: 100 INJECTION, POWDER, FOR SOLUTION INTRAMUSCULAR; INTRAVENOUS at 10:36

## 2024-08-06 RX ADMIN — MICONAZOLE NITRATE: 2 POWDER TOPICAL at 07:42

## 2024-08-06 RX ADMIN — Medication 2000 UNITS: at 08:05

## 2024-08-06 RX ADMIN — MIDODRINE HYDROCHLORIDE 20 MG: 10 TABLET ORAL at 17:57

## 2024-08-06 RX ADMIN — NEPHROCAP 1 MG: 1 CAP ORAL at 08:05

## 2024-08-06 RX ADMIN — LACTULOSE 20 G: 20 SOLUTION ORAL at 13:38

## 2024-08-06 RX ADMIN — INSULIN GLARGINE 35 UNITS: 100 INJECTION, SOLUTION SUBCUTANEOUS at 20:24

## 2024-08-06 RX ADMIN — SODIUM CHLORIDE, PRESERVATIVE FREE 40 MG: 5 INJECTION INTRAVENOUS at 08:05

## 2024-08-06 RX ADMIN — POLYETHYLENE GLYCOL 3350 17 G: 17 POWDER, FOR SOLUTION ORAL at 08:06

## 2024-08-06 RX ADMIN — INSULIN LISPRO 4 UNITS: 100 INJECTION, SOLUTION INTRAVENOUS; SUBCUTANEOUS at 20:24

## 2024-08-06 RX ADMIN — HYDROCORTISONE SODIUM SUCCINATE 100 MG: 100 INJECTION, POWDER, FOR SOLUTION INTRAMUSCULAR; INTRAVENOUS at 04:09

## 2024-08-06 RX ADMIN — DOCUSATE SODIUM 100 MG: 50 LIQUID ORAL at 20:25

## 2024-08-06 RX ADMIN — CHLORHEXIDINE GLUCONATE 15 ML: 1.2 RINSE ORAL at 20:25

## 2024-08-06 RX ADMIN — MEROPENEM 500 MG: 500 INJECTION, POWDER, FOR SOLUTION INTRAVENOUS at 22:39

## 2024-08-06 RX ADMIN — MICONAZOLE NITRATE: 20 CREAM TOPICAL at 07:42

## 2024-08-06 RX ADMIN — HYDROCORTISONE SODIUM SUCCINATE 100 MG: 100 INJECTION, POWDER, FOR SOLUTION INTRAMUSCULAR; INTRAVENOUS at 17:57

## 2024-08-06 RX ADMIN — MIDODRINE HYDROCHLORIDE 20 MG: 10 TABLET ORAL at 12:11

## 2024-08-06 RX ADMIN — Medication 13 MCG/MIN: at 18:06

## 2024-08-06 RX ADMIN — MICONAZOLE NITRATE: 20 CREAM TOPICAL at 20:25

## 2024-08-06 RX ADMIN — MIDODRINE HYDROCHLORIDE 20 MG: 10 TABLET ORAL at 07:29

## 2024-08-06 RX ADMIN — WATER: 100 IRRIGANT IRRIGATION at 11:47

## 2024-08-06 RX ADMIN — MEROPENEM 500 MG: 500 INJECTION, POWDER, FOR SOLUTION INTRAVENOUS at 10:43

## 2024-08-06 RX ADMIN — MICONAZOLE NITRATE: 2 POWDER TOPICAL at 20:24

## 2024-08-06 RX ADMIN — LACTULOSE 20 G: 20 SOLUTION ORAL at 08:05

## 2024-08-06 RX ADMIN — DOCUSATE SODIUM 100 MG: 50 LIQUID ORAL at 08:05

## 2024-08-06 ASSESSMENT — PULMONARY FUNCTION TESTS
PIF_VALUE: 30
PIF_VALUE: 31
PIF_VALUE: 30
PIF_VALUE: 29
PIF_VALUE: 30
PIF_VALUE: 33
PIF_VALUE: 29
PIF_VALUE: 27
PIF_VALUE: 30
PIF_VALUE: 34
PIF_VALUE: 33
PIF_VALUE: 28
PIF_VALUE: 28
PIF_VALUE: 29
PIF_VALUE: 34
PIF_VALUE: 31
PIF_VALUE: 31
PIF_VALUE: 29
PIF_VALUE: 35
PIF_VALUE: 28
PIF_VALUE: 30
PIF_VALUE: 31
PIF_VALUE: 29
PIF_VALUE: 30
PIF_VALUE: 29
PIF_VALUE: 31
PIF_VALUE: 28
PIF_VALUE: 33
PIF_VALUE: 30
PIF_VALUE: 29
PIF_VALUE: 30
PIF_VALUE: 30
PIF_VALUE: 34
PIF_VALUE: 29
PIF_VALUE: 29
PIF_VALUE: 31
PIF_VALUE: 31
PIF_VALUE: 29
PIF_VALUE: 30
PIF_VALUE: 31
PIF_VALUE: 29
PIF_VALUE: 30
PIF_VALUE: 28
PIF_VALUE: 30
PIF_VALUE: 30
PIF_VALUE: 28
PIF_VALUE: 31
PIF_VALUE: 30
PIF_VALUE: 35
PIF_VALUE: 34
PIF_VALUE: 29
PIF_VALUE: 33
PIF_VALUE: 30
PIF_VALUE: 33
PIF_VALUE: 30
PIF_VALUE: 34
PIF_VALUE: 29
PIF_VALUE: 30
PIF_VALUE: 28
PIF_VALUE: 30
PIF_VALUE: 29
PIF_VALUE: 30
PIF_VALUE: 28
PIF_VALUE: 30
PIF_VALUE: 31
PIF_VALUE: 30
PIF_VALUE: 30
PIF_VALUE: 31
PIF_VALUE: 31
PIF_VALUE: 33
PIF_VALUE: 34
PIF_VALUE: 31
PIF_VALUE: 29
PIF_VALUE: 30
PIF_VALUE: 28
PIF_VALUE: 30
PIF_VALUE: 30
PIF_VALUE: 31
PIF_VALUE: 30
PIF_VALUE: 31
PIF_VALUE: 29
PIF_VALUE: 35
PIF_VALUE: 30
PIF_VALUE: 29
PIF_VALUE: 28

## 2024-08-06 ASSESSMENT — PAIN SCALES - GENERAL
PAINLEVEL_OUTOF10: 0

## 2024-08-06 ASSESSMENT — PAIN DESCRIPTION - LOCATION: LOCATION: ARM

## 2024-08-06 ASSESSMENT — PAIN DESCRIPTION - ORIENTATION: ORIENTATION: LEFT

## 2024-08-06 ASSESSMENT — PAIN SCALES - WONG BAKER
WONGBAKER_NUMERICALRESPONSE: NO HURT
WONGBAKER_NUMERICALRESPONSE: NO HURT

## 2024-08-06 NOTE — PROGRESS NOTES
Infectious Diseases Inpatient Progress Note          HISTORY OF PRESENT ILLNESS:  Follow up septic and hypovolemic shock in a patient who was admitted with severe bleeding from left upper extremity AV fistula, requiring 2 units of packed red blood cells since admission, currently with hypoxic encephalopathy following cardiac arrest with lack of responsiveness despite being off sedation for 96 hours, end-stage renal disease on hemodialysis, currently intubated, on Levophed at 9 mics, but with Klebsiella pneumonia on assist-control 40% on IV vancomycin and meropenem, well tolerated.   Positive hypothermia  No fevers  Tolerating tube feeding  EEG consistent with hypoxic ischemic encephalopathy per Dr. Guadarrama's note  Current Medications:     hydrocortisone sodium succinate PF  100 mg IntraVENous Q8H    meropenem  500 mg IntraVENous Q12H    docusate  100 mg Oral BID    polyethylene glycol  17 g Oral Daily    vancomycin (VANCOCIN) intermittent dosing (placeholder)   Other RX Placeholder    lactulose  20 g Oral TID    miconazole   Topical BID    chlorhexidine  15 mL Mouth/Throat BID    insulin lispro  0-16 Units SubCUTAneous Q4H    pantoprazole (PROTONIX) 40 mg in sodium chloride (PF) 0.9 % 10 mL injection  40 mg IntraVENous Daily    sodium chloride flush  5-40 mL IntraVENous 2 times per day    sodium chloride flush  5-40 mL IntraVENous 2 times per day    Virt-Caps  1 capsule Oral Daily    Vitamin D  2,000 Units Oral Daily    [Held by provider] ARIPiprazole  5 mg Oral Daily    fludrocortisone  0.1 mg Oral BID    [Held by provider] sertraline  50 mg Oral Daily    miconazole   Topical BID    epoetin chadwick-epbx  8,000 Units SubCUTAneous Once per day on Mon Wed Fri    sodium chloride flush  5-40 mL IntraVENous 2 times per day    insulin glargine  35 Units SubCUTAneous Nightly    midodrine  20 mg Oral TID WC    [Held by provider] budesonide-formoterol  2 puff Inhalation BID RT    And    [Held by provider] tiotropium  2 puff  Inhalation Daily RT    pill splitter   Does not apply Once       Allergies:  Codeine and Oxycontin [oxycodone hcl]      Review of Systems  unable to provide ROS because of altered mentation      Physical Exam  Vitals:    08/06/24 1230 08/06/24 1245 08/06/24 1300 08/06/24 1315   BP:       Pulse: 71 76 74 77   Resp: 20 20 20 20   Temp: (!) 96.4 °F (35.8 °C) 96.8 °F (36 °C) 97 °F (36.1 °C) 97.2 °F (36.2 °C)   TempSrc:   Esophageal    SpO2: 98% 99% 99% 98%   Weight:       Height:         General Appearance:   Intubated and unresponsive to verbal stimulation   On assist-control 40%   OG is connected to tube feed   Intact ET  Positive generalized edema  skin: warm and dry, no rash.   Head: normocephalic and atraumatic  Eyes: Positive icteric sclerae  Lungs: Assisted ventilation.  No rhonchi or wheezing  Heart normal S1-S2  Abdomen: Soft, mild distention, no rigidity  Positive ecchymotic areas of the upper extremities  Right thigh blister  No erythema  Intact left femoral dialysis catheter  Intact left chest Kenyon catheter      DATA:    Lab Results   Component Value Date    WBC 23.0 (H) 08/06/2024    HGB 8.8 (L) 08/06/2024    HCT 26.6 (L) 08/06/2024    MCV 87.5 08/06/2024    PLT 78 (L) 08/06/2024     Lab Results   Component Value Date    CREATININE 3.0 (H) 08/06/2024    BUN 38 (H) 08/06/2024     08/06/2024    K 4.8 08/06/2024    CL 99 08/06/2024    CO2 18 (L) 08/06/2024       Hepatic Function Panel:  Lab Results   Component Value Date/Time    ALKPHOS 108 08/02/2024 12:02 PM    ALT NOT DONE 08/02/2024 12:02 PM    AST NOT DONE 08/02/2024 12:02 PM    BILITOT 2.4 08/02/2024 12:02 PM    BILIDIR NOT DONE 08/02/2024 12:02 PM    IBILI 0.9 08/02/2024 12:02 PM       Microbiology:     Recent Labs     08/03/24  1633   CULTRESP Direct Exam:  >25 NEUTROPHILS/LPF  Direct Exam:  < 10 EPITHELIAL CELLS/LPF  Direct Exam:  PREDOMINANT ORGANISM: GRAM NEGATIVE COCCOBACILLI  Direct Exam:  MIXED BACTERIAL MORPHOTYPES ALSO PRESENT ON GRAM  STAIN.  Cult,Respiratory:  NO NORMAL FLORENTINO  Performed at [facility] [place] [address]  ([phone]  *  LIGHT GROWTH  Identification by MALDI-TOF     Sputum culture with Klebsiella that is sensitive to all antibiotics tested except for ampicillin    IMPRESSION:    Septic and hypovolemic shock with hypovolemic shock related to active bleeding from left upper extremity AV fistula, remains to be on Levophed 9 mics  Gram-negative/Klebsiella pneumonia status post cardiopulmonary arrest and resuscitation, currently intubated on assist-control 40%  Acute encephalopathy status postcardiac arrest, probably hypoxic  Chronic antibiotic suppressive therapy on p.o. amoxicillin for left knee prosthetic joint infection and chronic osteomyelitis with Enterococcus  End-stage renal disease on hemodialysis and hypogammaglobulinemia        PLAN:  DC IV vancomycin   Continue IV meropenem to cover for pneumonia and left knee chronic osteomyelitis.  Will start p.o. amoxicillin once done with IV meropenem  Follow-up blood culture results  Follow-up CBC  Vent and vasopressor support and weaning per intensivist team  Follow-up clinically    Discussed with the pharmacist    Autumn Rojo MD

## 2024-08-06 NOTE — DIALYSIS
Attempted to restart CRRT. Unable to restart due to poorly functioning access. Difficult to aspirate from either lumen.both lumen flushed & attempted to reverse lines without success. Primary RN Kayleigh notified

## 2024-08-06 NOTE — PROGRESS NOTES
Shift summary:[id number]  Assumed care of patient, head to toe completed- see flowsheet. Meds given per eMAR. Patient is hypothermic on the aly hugger, CRRT started.   Initiated blood warmer on CRRT and warm blankets applied to help with patient cool core temperature.     Patient had a rapid decline in vital signs,  at bedside, gtts titrated per his verbal order. Rate of CRRT turned down so patient tolerates to continue treatment. Gtts titrated back down as vitals recovered.     Bed bath provided, linen and gown changed.     Handoff report given to Kayleigh HUDDLESTON

## 2024-08-06 NOTE — PROGRESS NOTES
Hospitalist Progress Note      PCP: Adilene Terry MD    Date of Admission: 7/27/2024    Chief Complaint:  remains intubated, remains unresponsive off sedation per nursing staff, hypotensive requiring Norepinephrine support.     Medications:  Reviewed    Infusion Medications    prismaSATE BGK 4/2.5 1,000 mL/hr (08/05/24 1843)    prismaSATE BGK 4/2.5 1,000 mL/hr (08/06/24 0509)    prismaSATE BGK 4/2.5 1,000 mL/hr (08/06/24 0509)    VASOpressin 20 Units in sodium chloride 0.9 % 100 mL infusion Stopped (08/05/24 0937)    norepinephrine 10 mcg/min (08/06/24 0600)    sodium chloride      sodium chloride      sodium chloride      dextrose       Scheduled Medications    hydrocortisone sodium succinate PF  100 mg IntraVENous Q8H    meropenem  500 mg IntraVENous Q12H    docusate  100 mg Oral BID    polyethylene glycol  17 g Oral Daily    vancomycin (VANCOCIN) intermittent dosing (placeholder)   Other RX Placeholder    lactulose  20 g Oral TID    miconazole   Topical BID    chlorhexidine  15 mL Mouth/Throat BID    insulin lispro  0-16 Units SubCUTAneous Q4H    pantoprazole (PROTONIX) 40 mg in sodium chloride (PF) 0.9 % 10 mL injection  40 mg IntraVENous Daily    sodium chloride flush  5-40 mL IntraVENous 2 times per day    sodium chloride flush  5-40 mL IntraVENous 2 times per day    Virt-Caps  1 capsule Oral Daily    Vitamin D  2,000 Units Oral Daily    [Held by provider] ARIPiprazole  5 mg Oral Daily    fludrocortisone  0.1 mg Oral BID    [Held by provider] sertraline  50 mg Oral Daily    miconazole   Topical BID    epoetin chadwick-epbx  8,000 Units SubCUTAneous Once per day on Mon Wed Fri    sodium chloride flush  5-40 mL IntraVENous 2 times per day    insulin glargine  35 Units SubCUTAneous Nightly    midodrine  20 mg Oral TID WC    [Held by provider] budesonide-formoterol  2 puff Inhalation BID RT    And    [Held by provider] tiotropium  2 puff Inhalation Daily RT    pill splitter   Does not apply Once     PRN Meds:

## 2024-08-06 NOTE — CARE COORDINATION
This AFUAW completed rounds with ICU team.  Patient is sedated and intubated.  Family awaiting results from the EEG done yesterday prior to making any decisions.    HIPOLITO/LIZBETH to follow.  Electronically signed by HIPOLITO Mortensen on 8/6/24 at 3:46 PM EDT

## 2024-08-06 NOTE — PROGRESS NOTES
Nephrology Progress Note  Labs stable  Assessment:  ESRD  Chronic hypotension On levo  Non responsive off sedation  Leucocytosis with CRP on duel antibiotics  DM type-2  Hx Sezures  Plan: continue CRRT    EEG today  Poor prognosis  Antibiotic on board    Patient Active Problem List:     Coronary artery disease involving native coronary artery of native heart with angina pectoris (Formerly [facility])     Schizophrenia, paranoid, chronic     Metabolic syndrome     Vitamin B 12 deficiency     Cerebral microvascular disease     Mixed hyperlipidemia     Other hammer toe (acquired)     Vitamin D insufficiency     Incontinence of urine     Diabetic nephropathy with proteinuria (HCC)     Essential (primary) hypertension     History of type C viral hepatitis     Urinary incontinence due to cognitive impairment     History of seizures     Stented coronary artery-plan is to stay on Plavix indefinately per Dr Walker     Diabetes mellitus (Formerly [facility])     Controlled type 2 diabetes mellitus with diabetic neuropathy, with long-term current use of insulin (Formerly [facility])     Hemiparesis, left ([facility]ly [facility])     Angina, class II (Formerly [facility])     Pain, unspecified     Tardive dyskinesia     Shortness of breath     Poorly controlled diabetes mellitus ([facility]ly [facility])     Chronic diastolic congestive heart failure ([facility]ly [facility])     [place] (obstructive sleep apnea)     Pulmonary hypertension, unspecified (Formerly [facility])     Class 2 severe obesity with serious comorbidity and body mass index (BMI) of 36.0 to 36.9 in adult (Formerly [facility])     Edema     Closed supracondylar fracture of right humerus     Other chronic pain     Palliative care patient     Recurrent falls     Chronic renal failure     Difficulty in walking     ESRD (end stage renal disease) on dialysis (Formerly [facility])     Weakness     Other seizures ([facility]ly [facility])     Moderate persistent asthma without complication     COVID-19     Post PTCA     Falls frequently     Chest wall contusion, left, initial encounter     Headache, unspecified     Paranoid schizophrenia ([facility]ly [facility])      chloride flush  5-40 mL IntraVENous 2 times per day    insulin glargine  35 Units SubCUTAneous Nightly    midodrine  20 mg Oral TID WC    [Held by provider] budesonide-formoterol  2 puff Inhalation BID RT    And    [Held by provider] tiotropium  2 puff Inhalation Daily RT    pill splitter   Does not apply Once     Continuous Infusions:   prismaSATE BGK 4/2.5 1,000 mL/hr (08/05/24 1843)    prismaSATE BGK 4/2.5 1,000 mL/hr (08/06/24 0509)    prismaSATE BGK 4/2.5 1,000 mL/hr (08/06/24 0509)    VASOpressin 20 Units in sodium chloride 0.9 % 100 mL infusion Stopped (08/05/24 0937)    norepinephrine 10 mcg/min (08/06/24 0600)    sodium chloride      sodium chloride      sodium chloride      dextrose         CBC:   Recent Labs     08/05/24  0543 08/06/24  0546   WBC 22.5* 23.0*   HGB 8.3* 8.9*   PLT 87* 78*     CMP:    Recent Labs     08/04/24  0553 08/05/24  0539 08/06/24  0541   * 137 135   K 5.3* 5.5* 4.8   CL 99 102 99   CO2 18* 14* 18*   BUN 35* 50* 38*   CREATININE 4.28* 4.71* 3.13*   GLUCOSE 193* 208* 127*   CALCIUM 9.3 9.5 9.6   LABGLOM 10.8* 9.6* 15.7*     Troponin: No results for input(s): \"TROPONINI\" in the last 72 hours.  BNP: No results for input(s): \"BNP\" in the last 72 hours.  INR: No results for input(s): \"INR\" in the last 72 hours.  Lipids:   Recent Labs     08/03/24  1815   TRIG 410*     Liver: No results for input(s): \"AST\", \"ALT\", \"ALKPHOS\", \"LABALBU\", \"BILITOT\" in the last 72 hours.    Invalid input(s): \"PROT\", \"BILDIR\"  Iron:  No results for input(s): \"FERRITIN\" in the last 72 hours.    Invalid input(s): \"IRONS\", \"LABIRONS\"  Urinalysis: No results for input(s): \"UA\" in the last 72 hours.    Objective:  Vitals: BP (!) 110/53   Pulse 76   Temp (!) 93.7 °F (34.3 °C)   Resp 30   Ht 1.727 m (5' 8\")   Wt 99 kg (218 lb 4.1 oz)   LMP  (LMP Unknown)   SpO2 99%   BMI 33.19 kg/m²    Wt Readings from Last 3 Encounters:   08/06/24 99 kg (218 lb 4.1 oz)   07/24/24 98.4 kg (217 lb)   07/15/24 98.4 kg  (217 lb)      24HR INTAKE/OUTPUT:    Intake/Output Summary (Last 24 hours) at 8/6/2024 0854  Last data filed at 8/6/2024 0800  Gross per 24 hour   Intake 765.24 ml   Output 1578 ml   Net -812.76 ml       General:not alert, in no apparent distress  HEENT: normocephalic, atraumatic, anicteric  Neck: supple, no mass  Lungs: non-labored respirations, clear to auscultation bilaterally  Heart: regular rate and rhythm, no murmurs or rubs  Abdomen: soft, non-tender, non-distended  Ext: no cyanosis, no peripheral edema  Neuro: alert and oriented, no gross abnormalities  Psych: normal mood and affect  Skin: no rash      Electronically signed by Jaden Finch DO

## 2024-08-06 NOTE — PROGRESS NOTES
Pulmonary & Critical Care Medicine ICU Progress Note  Chief complaint : Shock    Subjunctive/24 hour events :   Patient seen and examined during multidisciplinary rounds with RN, charge nurse, RT, pharmacy, dietitian, and social service.       On vent, unresponsive, she is off sedation, she is on Levophed at 10 mcg/min, low temp 93.7 °F, no apparent distress, CRRT is working, urine output none, CRRT output 1400 cc,no bowel movement, +6.8 L per        New information updated in the note today, rest of the examination did not change compared to yesterday.  Social History     Tobacco Use    Smoking status: Never     Passive exposure: Never    Smokeless tobacco: Never   Substance Use Topics    Alcohol use: No     Alcohol/week: 0.0 standard drinks of alcohol         Problem Relation Age of Onset    Cancer Mother 68        survived    Breast Cancer Mother     Hypertension Father     Diabetes Sister     Mental Illness Sister     Colon Cancer Neg Hx        No results for input(s): \"PHART\", \"IIO1KHN\", \"PO2ART\" in the last 72 hours.      MV Settings:  Vent Mode: AC/VC Resp Rate (Set): 30 bpm/Vt (Set, mL): 350 mL/ /FiO2 : 40 %           IV:   prismaSATE BGK 4/2.5 1,000 mL/hr (08/05/24 1843)    prismaSATE BGK 4/2.5 1,000 mL/hr (08/06/24 0509)    prismaSATE BGK 4/2.5 1,000 mL/hr (08/06/24 0509)    VASOpressin 20 Units in sodium chloride 0.9 % 100 mL infusion Stopped (08/05/24 0937)    norepinephrine 10 mcg/min (08/06/24 0600)    sodium chloride      sodium chloride      sodium chloride      dextrose         Vitals:  BP (!) 110/53   Pulse 69   Temp (!) 93.9 °F (34.4 °C)   Resp 30   Ht 1.727 m (5' 8\")   Wt 99 kg (218 lb 4.1 oz)   LMP  (LMP Unknown)   SpO2 98%   BMI 33.19 kg/m²    Tmax:        Intake/Output Summary (Last 24 hours) at 8/6/2024 0840  Last data filed at 8/6/2024 0800  Gross per 24 hour   Intake 765.24 ml   Output 1578 ml   Net -812.76 ml         EXAM:  General: Unresponsive on vent  Head: normocephalic,  puff Inhalation Daily RT    pill splitter   Does not apply Once       PRN Meds:  sodium chloride flush **AND** sodium chloride flush, heparin (porcine), perflutren lipid microspheres, perflutren lipid microspheres, ipratropium 0.5 mg-albuterol 2.5 mg, naloxone 0.4 mg in 10 mL sodium chloride syringe, sodium chloride flush, sodium chloride, fentanNYL, HYDROmorphone, sodium chloride flush, sodium chloride, albuterol, melatonin, sodium chloride flush, sodium chloride, ondansetron **OR** ondansetron, polyethylene glycol, acetaminophen **OR** acetaminophen, glucose, dextrose bolus **OR** dextrose bolus, glucagon (rDNA), dextrose    Results: reviewed by me   CBC:   Recent Labs     08/04/24  0552 08/05/24  0543 08/06/24  0546   WBC 23.1* 22.5* 23.0*   HGB 9.0* 8.3* 8.9*   HCT 26.7* 24.7* 26.6*   MCV 88.4 89.5 87.5   * 87* 78*       BMP:   Recent Labs     08/04/24  0553 08/05/24  0539 08/06/24  0541   * 137 135   K 5.3* 5.5* 4.8   CL 99 102 99   CO2 18* 14* 18*   PHOS 6.6* 7.1* 4.1   BUN 35* 50* 38*   CREATININE 4.28* 4.71* 3.13*       LIVER PROFILE:   No results for input(s): \"AST\", \"ALT\", \"LIPASE\", \"AMYLASE\", \"BILIDIR\", \"BILITOT\", \"ALKPHOS\" in the last 72 hours.    Invalid input(s): \"ALB\"    PT/INR:   No results for input(s): \"PROTIME\", \"INR\" in the last 72 hours.    APTT:   No results for input(s): \"APTT\" in the last 72 hours.    UA:No results for input(s): \"NITRITE\", \"COLORU\", \"PHUR\", \"LABCAST\", \"WBCUA\", \"RBCUA\", \"MUCUS\", \"TRICHOMONAS\", \"YEAST\", \"BACTERIA\", \"CLARITYU\", \"SPECGRAV\", \"LEUKOCYTESUR\", \"UROBILINOGEN\", \"BILIRUBINUR\", \"BLOODU\", \"GLUCOSEU\", \"AMORPHOUS\" in the last 72 hours.    Invalid input(s): \"KETONESU\"    Cultures:  Negative so far  Films:  CXR films reviewed by me and it showed no acute infiltrate    CT abdomen  IMPRESSION:  1. There is a large volume of ascites within the abdomen and pelvis.  2. Cirrhotic appearing liver.  3. Cholelithiasis.  4. Bilateral pleural effusions with bibasilar  consolidative airspace disease  concerning for pneumonia and/or atelectasis.     CT head no acute finding    Assessment:  This is a critically ill patient at risk of deterioration / death , needing close ICU monitoring and intervention due to below noted problems     Postcardiac arrest  Septic shock  Acute encephalopathy postcardiac arrest  Acute respiratory insufficiency  Hypoadrenalism  Anemia, secondary to blood loss from the fistula  End-stage renal disease on CRRT  Adrenal insufficiency  Ascites status post paracentesis 7/30/2020,  4 L removed  Bleed from dialysis site  A-fib, cannot anticoagulate due to recurrent bleed and blood transfusion  Hypereosinophilia       Recommendation  Vent support lung protective strategy  head of the bed 30°  Daily sedation holidays and breathing trials  Sedation with combination propofol and fentanyl target R ASS of 0 to -1  Watch for ICU delirium: TV on, natural light, avoid benzos, pain control, early mobility, and family engagement  PUD prophylaxis  DVT prophylaxis   tube feed  Target blood sugar 140-180  Levophed to maintain MAP ~65-70  Monitor and replace electrolyte as needed  Tunneled dialysis catheter and resume dialysis later today   Continue meropenem  Hold Abilify and Zoloft for now  Repeat ABG   Follow-up EEG result  Lactulose enema         Discussed with Dr. Denise    Discussed with Dr. Guadarrama    Discussed with patient daughter      Due to the immediate potential for life-threatening deterioration due to septic shock I spent 35    minutes providing critical care.  This time is excluding time spent performing procedures.          Electronically signed by Say Marsh MD,  [facility] ,on 8/6/2024 at 8:40 AM

## 2024-08-06 NOTE — PROGRESS NOTES
0700am Report from Amanda HUDDLESTON. Vented No sedation. Remains unresponsive. Pupils nonreactive. CRRT in progress. Levo @ 10 meqs. Milagros NP updated on status  0740am Daughter called and updated on status  08:55am DR Finch in and updated on status  09:40am Critical care rounds with DR Marsh and team. Daughter present during rounds. CRRT clotted off and Dialysis called will be up to restart.   11:50am Lactulose enema given per order. BP dropped to 70's levo increased.   12:45pm BP stable. Levo dec to 8 meqs. Tube feeding started per order  12:54pm Daughter called and updated on status  13:50pm Dialysis here to Re-set up CRRT  1400pm Dialysis not able to restart CRRT. Dr Finch notified and no dialysis at this time  1600pm Remains unresponsive VSS MP-SR  19:20pm Inc of small amount of stool. Report to Jodi HUDDLESTON

## 2024-08-06 NOTE — PROGRESS NOTES
Neurology Follow up    SUBJECTIVE: Intubated unresponsive not sedated.  Daughter at the bedside.    8/6  Patient not sedated for at least 96 hours patient unresponsive no response noted.  Pupils are not reactive doll's eyes are absent corneal response absent gag response aspirin and no pain response  Current Facility-Administered Medications   Medication Dose Route Frequency Provider Last Rate Last Admin    hydrocortisone sodium succinate PF (SOLU-CORTEF) injection 100 mg  100 mg IntraVENous Q8H Milagros Hanna APRN - CNP   100 mg at 08/06/24 1036    prismaSATE BGK 4/2.5 dialysis solution   Dialysis Continuous Jaden Finch, DO 1,000 mL/hr at 08/05/24 1843 1,000 mL/hr at 08/05/24 1843    prismaSATE BGK 4/2.5 dialysis solution   Dialysis Continuous Jaden Finch, DO 1,000 mL/hr at 08/06/24 0509 1,000 mL/hr at 08/06/24 0509    prismaSATE BGK 4/2.5 dialysis solution   Dialysis Continuous Jaden Finch DO 1,000 mL/hr at 08/06/24 0509 1,000 mL/hr at 08/06/24 0509    sodium chloride flush 0.9 % injection 1.1-1.9 mL  1.1-1.9 mL IntraCATHeter PRN Jaden Finch DO        And    sodium chloride flush 0.9 % injection 1.1-1.9 mL  1.1-1.9 mL IntraCATHeter PRN Jaden Finch DO        meropenem (MERREM) 500 mg in sodium chloride 0.9 % 100 mL IVPB (mini-bag)  500 mg IntraVENous Q12H Autumn Rojo MD 33.3 mL/hr at 08/06/24 1043 500 mg at 08/06/24 1043    heparin (porcine) injection 2,500 Units  2,500 Units IntraCATHeter PRN Say Marsh MD   2,500 Units at 08/05/24 1435    VASOpressin 20 Units in sodium chloride 0.9 % 100 mL infusion  0.01-0.03 Units/min IntraVENous Continuous Say Marsh MD   Stopped at 08/05/24 0937    norepinephrine (LEVOPHED) 16 mg in sodium chloride 0.9 % 250 mL infusion  1-100 mcg/min IntraVENous Continuous Milagros Hanna APRN - CNP 9.4 mL/hr at 08/06/24 0600 10 mcg/min at 08/06/24 0600    docusate (COLACE) 50 MG/5ML liquid 100 mg  100 mg Oral BID Milagros Hanna APRN - CNP   100  Daily PRN Joss Denise MD        acetaminophen (TYLENOL) tablet 650 mg  650 mg Oral Q6H PRN Joss Denise MD   650 mg at 07/31/24 0041    Or    acetaminophen (TYLENOL) suppository 650 mg  650 mg Rectal Q6H PRN Joss Denise MD        glucose chewable tablet 16 g  4 tablet Oral PRN Joss Denise MD        dextrose bolus 10% 125 mL  125 mL IntraVENous PRN Joss Denise MD        Or    dextrose bolus 10% 250 mL  250 mL IntraVENous PRN Joss Denise MD        glucagon injection 1 mg  1 mg SubCUTAneous PRN Joss Denise MD        dextrose 10 % infusion   IntraVENous Continuous PRN Joss Denise MD        insulin glargine (LANTUS) injection vial 35 Units  35 Units SubCUTAneous Nightly Joss Denise MD   35 Units at 08/04/24 2121    midodrine (PROAMATINE) tablet 20 mg  20 mg Oral TID WC Joss Denise MD   20 mg at 08/06/24 1211    [Held by provider] budesonide-formoterol (SYMBICORT) 80-4.5 MCG/ACT inhaler 2 puff  2 puff Inhalation BID RT Joss Deinse MD        And    [Held by provider] tiotropium (SPIRIVA RESPIMAT) 2.5 MCG/ACT inhaler 2 puff  2 puff Inhalation Daily RT Joss Denise MD        pill splitter   Does not apply Once Joss Denise MD           PHYSICAL EXAM:    BP (!) 110/53   Pulse 76   Temp 96.8 °F (36 °C)   Resp 20   Ht 1.727 m (5' 8\")   Wt 99 kg (218 lb 4.1 oz)   LMP  (LMP Unknown)   SpO2 99%   BMI 33.19 kg/m²    General Appearance:      Skin:  normal  CVS - Normal sounds, No murmurs , No carotid Bruits  RS -CTA  Abdomen Soft, BS present  Review of Systems   Unable to perform ROS: Intubated      Mental Status Exam: Unresponsive with no pupillary or any brainstem or cortical responses.                Funduscopic Exam:     Cranial Nerves            Cranial nerve III           Pupils:  equal, round, nonreactive to light      Cranial nerves III, IV, VI           Extraocular Movements: intact/absent  dolls      Absent corneal response Motor:    No response to pain at all.          Sensory: Unable to do        Pinprick                      Vibration                         Touch            Proprioception                 Coordination: Unable to perform                  Gait:                       Casual: Deferred          Reflexes:             Deep Tendon Reflexes:             Reflexes are flaccid areflexic           Plantar response: Unresponsive               Right:  downgoing               Left:  downgoing    Vascular:  Cardiac Exam:  normal         No results found.    Recent Labs     08/04/24  0552 08/05/24  0543 08/06/24  0546 08/06/24  1003 08/06/24  1155   WBC 23.1* 22.5* 23.0*  --   --    HGB 9.0* 8.3* 8.9* 8.6* 8.8*   * 87* 78*  --   --        Recent Labs     08/04/24  0553 08/05/24  0539 08/06/24  0541 08/06/24  1003 08/06/24  1155   * 137 135  --   --    K 5.3* 5.5* 4.8  --   --    CL 99 102 99  --   --    CO2 18* 14* 18*  --   --    BUN 35* 50* 38*  --   --    CREATININE 4.28* 4.71* 3.13* 3.0* 3.0*   GLUCOSE 193* 208* 127*  --   --        No results for input(s): \"BILITOT\", \"ALKPHOS\", \"AST\", \"ALT\" in the last 72 hours.    Lab Results   Component Value Date/Time    PROTIME 19.6 08/02/2024 12:02 PM    INR 1.6 08/02/2024 12:02 PM     No results found for: \"LITHIUM\", \"DILFRTOT\", \"VALPROATE\"    ASSESSMENT AND PLAN  Hypoxic ischemic encephalopathy or metabolic encephalopathy.  Patient has been off sedation for 48 hours.  No response are noted.  Patient does not have any pupillary response and doll's eyes are absent and no corneal response present.  She had a faint gag response.  Patient does not respond to pain.  Daughter at the bedside findings discussed with the family that we will first obtain an EEG and wait another day or 2 until the sedation wears off as she is also on dialysis.  At this time we are not seeing much in the way of cortical or brainstem responses.  The CT scan that reported

## 2024-08-07 LAB
ALBUMIN SERPL-MCNC: 3.1 G/DL (ref 3.5–4.6)
ANION GAP SERPL CALCULATED.3IONS-SCNC: 17 MEQ/L (ref 9–15)
ANISOCYTOSIS BLD QL SMEAR: ABNORMAL
BASOPHILS # BLD: 0 K/UL (ref 0–0.2)
BASOPHILS NFR BLD: 0.2 %
BUN SERPL-MCNC: 48 MG/DL (ref 8–23)
CALCIUM SERPL-MCNC: 10.3 MG/DL (ref 8.5–9.9)
CHLORIDE SERPL-SCNC: 103 MEQ/L (ref 95–107)
CO2 SERPL-SCNC: 20 MEQ/L (ref 20–31)
CREAT SERPL-MCNC: 3.63 MG/DL (ref 0.5–0.9)
CRP SERPL HS-MCNC: 54.1 MG/L (ref 0–5)
EOSINOPHIL # BLD: 0 K/UL (ref 0–0.7)
EOSINOPHIL NFR BLD: 0 %
ERYTHROCYTE [DISTWIDTH] IN BLOOD BY AUTOMATED COUNT: 23 % (ref 11.5–14.5)
GLUCOSE BLD-MCNC: 122 MG/DL (ref 70–99)
GLUCOSE BLD-MCNC: 161 MG/DL (ref 70–99)
GLUCOSE BLD-MCNC: 168 MG/DL (ref 70–99)
GLUCOSE BLD-MCNC: 91 MG/DL (ref 70–99)
GLUCOSE BLD-MCNC: 94 MG/DL (ref 70–99)
GLUCOSE BLD-MCNC: 98 MG/DL (ref 70–99)
GLUCOSE SERPL-MCNC: 134 MG/DL (ref 70–99)
HCT VFR BLD AUTO: 27.4 % (ref 37–47)
HGB BLD-MCNC: 8.9 G/DL (ref 12–16)
LYMPHOCYTES # BLD: 1.2 K/UL (ref 1–4.8)
LYMPHOCYTES NFR BLD: 5 %
MACROCYTES BLD QL SMEAR: ABNORMAL
MCH RBC QN AUTO: 29.8 PG (ref 27–31.3)
MCHC RBC AUTO-ENTMCNC: 32.5 % (ref 33–37)
MCV RBC AUTO: 91.6 FL (ref 79.4–94.8)
MONOCYTES # BLD: 1.7 K/UL (ref 0.2–0.8)
MONOCYTES NFR BLD: 6.7 %
NEUTROPHILS # BLD: 21.4 K/UL (ref 1.4–6.5)
NEUTS SEG NFR BLD: 89 %
OVALOCYTES BLD QL SMEAR: ABNORMAL
PERFORMED ON: ABNORMAL
PERFORMED ON: NORMAL
PHOSPHATE SERPL-MCNC: 5.9 MG/DL (ref 2.3–4.8)
PLATELET # BLD AUTO: 103 K/UL (ref 130–400)
PLATELET BLD QL SMEAR: ABNORMAL
POIKILOCYTOSIS BLD QL SMEAR: ABNORMAL
POLYCHROMASIA BLD QL SMEAR: ABNORMAL
POTASSIUM SERPL-SCNC: 5.1 MEQ/L (ref 3.4–4.9)
RBC # BLD AUTO: 2.99 M/UL (ref 4.2–5.4)
SCHISTOCYTES BLD QL SMEAR: ABNORMAL
SLIDE REVIEW: ABNORMAL
SODIUM SERPL-SCNC: 140 MEQ/L (ref 135–144)
WBC # BLD AUTO: 24 K/UL (ref 4.8–10.8)

## 2024-08-07 PROCEDURE — 6360000002 HC RX W HCPCS: Performed by: NURSE PRACTITIONER

## 2024-08-07 PROCEDURE — 6370000000 HC RX 637 (ALT 250 FOR IP): Performed by: COLON & RECTAL SURGERY

## 2024-08-07 PROCEDURE — 6360000002 HC RX W HCPCS: Performed by: INTERNAL MEDICINE

## 2024-08-07 PROCEDURE — 99232 SBSQ HOSP IP/OBS MODERATE 35: CPT | Performed by: INTERNAL MEDICINE

## 2024-08-07 PROCEDURE — 80069 RENAL FUNCTION PANEL: CPT

## 2024-08-07 PROCEDURE — 94003 VENT MGMT INPAT SUBQ DAY: CPT

## 2024-08-07 PROCEDURE — 99291 CRITICAL CARE FIRST HOUR: CPT | Performed by: INTERNAL MEDICINE

## 2024-08-07 PROCEDURE — 6370000000 HC RX 637 (ALT 250 FOR IP): Performed by: INTERNAL MEDICINE

## 2024-08-07 PROCEDURE — 6370000000 HC RX 637 (ALT 250 FOR IP): Performed by: NURSE PRACTITIONER

## 2024-08-07 PROCEDURE — 6360000002 HC RX W HCPCS: Performed by: COLON & RECTAL SURGERY

## 2024-08-07 PROCEDURE — 99233 SBSQ HOSP IP/OBS HIGH 50: CPT | Performed by: PSYCHIATRY & NEUROLOGY

## 2024-08-07 PROCEDURE — 2700000000 HC OXYGEN THERAPY PER DAY

## 2024-08-07 PROCEDURE — 85025 COMPLETE CBC W/AUTO DIFF WBC: CPT

## 2024-08-07 PROCEDURE — 99233 SBSQ HOSP IP/OBS HIGH 50: CPT

## 2024-08-07 PROCEDURE — 2580000003 HC RX 258: Performed by: COLON & RECTAL SURGERY

## 2024-08-07 PROCEDURE — 2580000003 HC RX 258: Performed by: ANESTHESIOLOGY

## 2024-08-07 PROCEDURE — 2580000003 HC RX 258: Performed by: INTERNAL MEDICINE

## 2024-08-07 PROCEDURE — 2000000000 HC ICU R&B

## 2024-08-07 PROCEDURE — 86140 C-REACTIVE PROTEIN: CPT

## 2024-08-07 PROCEDURE — 2500000003 HC RX 250 WO HCPCS: Performed by: NURSE PRACTITIONER

## 2024-08-07 PROCEDURE — 2580000003 HC RX 258: Performed by: NURSE PRACTITIONER

## 2024-08-07 RX ADMIN — Medication 6 MCG/MIN: at 19:03

## 2024-08-07 RX ADMIN — MIDODRINE HYDROCHLORIDE 20 MG: 10 TABLET ORAL at 18:32

## 2024-08-07 RX ADMIN — ACETAMINOPHEN 325MG 650 MG: 325 TABLET ORAL at 16:45

## 2024-08-07 RX ADMIN — HYDROCORTISONE SODIUM SUCCINATE 100 MG: 100 INJECTION, POWDER, FOR SOLUTION INTRAMUSCULAR; INTRAVENOUS at 10:09

## 2024-08-07 RX ADMIN — HYDROCORTISONE SODIUM SUCCINATE 100 MG: 100 INJECTION, POWDER, FOR SOLUTION INTRAMUSCULAR; INTRAVENOUS at 18:32

## 2024-08-07 RX ADMIN — FLUDROCORTISONE ACETATE 0.1 MG: 0.1 TABLET ORAL at 21:16

## 2024-08-07 RX ADMIN — MEROPENEM 500 MG: 500 INJECTION, POWDER, FOR SOLUTION INTRAVENOUS at 13:04

## 2024-08-07 RX ADMIN — DOCUSATE SODIUM 100 MG: 50 LIQUID ORAL at 21:15

## 2024-08-07 RX ADMIN — FLUDROCORTISONE ACETATE 0.1 MG: 0.1 TABLET ORAL at 10:16

## 2024-08-07 RX ADMIN — MIDODRINE HYDROCHLORIDE 20 MG: 10 TABLET ORAL at 13:06

## 2024-08-07 RX ADMIN — Medication 10 ML: at 10:17

## 2024-08-07 RX ADMIN — MICONAZOLE NITRATE: 20 CREAM TOPICAL at 21:19

## 2024-08-07 RX ADMIN — NEPHROCAP 1 MG: 1 CAP ORAL at 10:09

## 2024-08-07 RX ADMIN — Medication 2000 UNITS: at 10:09

## 2024-08-07 RX ADMIN — HYDROCORTISONE SODIUM SUCCINATE 100 MG: 100 INJECTION, POWDER, FOR SOLUTION INTRAMUSCULAR; INTRAVENOUS at 00:21

## 2024-08-07 RX ADMIN — EPOETIN ALFA-EPBX 8000 UNITS: 4000 INJECTION, SOLUTION INTRAVENOUS; SUBCUTANEOUS at 23:52

## 2024-08-07 RX ADMIN — Medication 10 ML: at 10:18

## 2024-08-07 RX ADMIN — MEROPENEM 500 MG: 500 INJECTION, POWDER, FOR SOLUTION INTRAVENOUS at 23:39

## 2024-08-07 RX ADMIN — MICONAZOLE NITRATE: 2 POWDER TOPICAL at 21:19

## 2024-08-07 RX ADMIN — CHLORHEXIDINE GLUCONATE 15 ML: 1.2 RINSE ORAL at 10:09

## 2024-08-07 RX ADMIN — ACETAMINOPHEN 325MG 650 MG: 325 TABLET ORAL at 23:42

## 2024-08-07 RX ADMIN — LACTULOSE 20 G: 20 SOLUTION ORAL at 21:15

## 2024-08-07 RX ADMIN — MICONAZOLE NITRATE: 2 POWDER TOPICAL at 10:09

## 2024-08-07 RX ADMIN — Medication 10 ML: at 21:19

## 2024-08-07 RX ADMIN — MIDODRINE HYDROCHLORIDE 20 MG: 10 TABLET ORAL at 10:09

## 2024-08-07 RX ADMIN — Medication 10 ML: at 21:10

## 2024-08-07 RX ADMIN — DOCUSATE SODIUM 100 MG: 50 LIQUID ORAL at 10:09

## 2024-08-07 RX ADMIN — CHLORHEXIDINE GLUCONATE 15 ML: 1.2 RINSE ORAL at 21:15

## 2024-08-07 RX ADMIN — SODIUM CHLORIDE, PRESERVATIVE FREE 40 MG: 5 INJECTION INTRAVENOUS at 10:09

## 2024-08-07 RX ADMIN — MICONAZOLE NITRATE: 20 CREAM TOPICAL at 10:10

## 2024-08-07 ASSESSMENT — PULMONARY FUNCTION TESTS
PIF_VALUE: 26
PIF_VALUE: 27
PIF_VALUE: 26
PIF_VALUE: 28
PIF_VALUE: 33
PIF_VALUE: 27
PIF_VALUE: 26
PIF_VALUE: 27
PIF_VALUE: 25
PIF_VALUE: 32
PIF_VALUE: 27
PIF_VALUE: 32
PIF_VALUE: 31
PIF_VALUE: 32
PIF_VALUE: 33
PIF_VALUE: 26
PIF_VALUE: 28
PIF_VALUE: 27
PIF_VALUE: 26
PIF_VALUE: 28
PIF_VALUE: 25
PIF_VALUE: 27
PIF_VALUE: 26
PIF_VALUE: 25
PIF_VALUE: 26
PIF_VALUE: 33
PIF_VALUE: 27
PIF_VALUE: 26
PIF_VALUE: 27
PIF_VALUE: 25
PIF_VALUE: 27
PIF_VALUE: 26
PIF_VALUE: 26
PIF_VALUE: 27
PIF_VALUE: 26
PIF_VALUE: 33
PIF_VALUE: 27
PIF_VALUE: 28
PIF_VALUE: 27
PIF_VALUE: 29
PIF_VALUE: 27
PIF_VALUE: 25
PIF_VALUE: 28
PIF_VALUE: 32
PIF_VALUE: 25
PIF_VALUE: 25
PIF_VALUE: 28
PIF_VALUE: 27
PIF_VALUE: 27
PIF_VALUE: 26
PIF_VALUE: 28
PIF_VALUE: 28
PIF_VALUE: 25
PIF_VALUE: 26
PIF_VALUE: 40
PIF_VALUE: 27
PIF_VALUE: 26
PIF_VALUE: 32
PIF_VALUE: 25
PIF_VALUE: 26
PIF_VALUE: 25
PIF_VALUE: 27
PIF_VALUE: 28
PIF_VALUE: 27
PIF_VALUE: 27
PIF_VALUE: 32
PIF_VALUE: 30
PIF_VALUE: 28
PIF_VALUE: 27
PIF_VALUE: 28
PIF_VALUE: 32
PIF_VALUE: 26
PIF_VALUE: 27
PIF_VALUE: 27
PIF_VALUE: 26
PIF_VALUE: 27
PIF_VALUE: 26
PIF_VALUE: 25
PIF_VALUE: 32
PIF_VALUE: 26
PIF_VALUE: 27

## 2024-08-07 ASSESSMENT — PAIN SCALES - GENERAL
PAINLEVEL_OUTOF10: 1
PAINLEVEL_OUTOF10: 0

## 2024-08-07 ASSESSMENT — PAIN SCALES - WONG BAKER
WONGBAKER_NUMERICALRESPONSE: NO HURT
WONGBAKER_NUMERICALRESPONSE: NO HURT

## 2024-08-07 NOTE — CARE COORDINATION
Spoke to  re: pt and her status. I met with dtr Angelina at bedside and we discussed plan. Angelina states dialysis is stopped and she wants to gather family to \"say goodbye\" and wants to meet with hospice. Dr. Finch did order hospice and I called referral to them and had to leave [facility] for them to call Angelina and set up a meeting.

## 2024-08-07 NOTE — PROGRESS NOTES
Pulmonary & Critical Care Medicine ICU Progress Note  Chief complaint : Hypotension     Subjunctive/24 hour events :   Patient seen and examined during multidisciplinary rounds with RN, charge nurse, RT, pharmacy, dietitian, and social service.   Patient continues on the ventilator FiO2 is 40% with 5 of PEEP O2 sats 98%.  She continues on Levophed at 13 mcg/min.  No sedation for approximately 4 days.  She is an uric and unable to do dialysis at this time.  She is not following any commands.  She is tolerating tube feed and bowels are moving.  Family is at bedside, they spoke with neurology yesterday regarding EEG results prognosis is poor patient likely in a coma state.  Social History     Tobacco Use    Smoking status: Never     Passive exposure: Never    Smokeless tobacco: Never   Substance Use Topics    Alcohol use: No     Alcohol/week: 0.0 standard drinks of alcohol         Problem Relation Age of Onset    Cancer Mother 68        survived    Breast Cancer Mother     Hypertension Father     Diabetes Sister     Mental Illness Sister     Colon Cancer Neg Hx        Recent Labs     08/06/24  1155 08/06/24  1420   PHART 7.524* 7.431   [id number] 29* 34*   PO2ART 185* 178*         MV Settings:  Vent Mode: AC/VC Resp Rate (Set): 20 bpm/Vt (Set, mL): 350 mL/ /FiO2 : 40 %           IV:   prismaSATE BGK 4/2.5 1,000 mL/hr (08/05/24 1843)    prismaSATE BGK 4/2.5 1,000 mL/hr (08/06/24 0509)    prismaSATE BGK 4/2.5 1,000 mL/hr (08/06/24 0509)    VASOpressin 20 Units in sodium chloride 0.9 % 100 mL infusion Stopped (08/05/24 0937)    norepinephrine 13 mcg/min (08/06/24 1806)    sodium chloride      sodium chloride      sodium chloride      dextrose         Vitals:  BP (!) 110/53   Pulse 73   Temp 97.5 °F (36.4 °C)   Resp 23   Ht 1.727 m (5' 8\")   Wt 96.7 kg (213 lb 3 oz)   LMP  (LMP Unknown)   SpO2 98%   BMI 32.41 kg/m²    Tmax:       Intake/Output Summary (Last 24 hours) at 8/7/2024 1030  Last data filed at 8/6/2024  Slight nodularity of the contour of the liver.  There is mild  perihepatic and perisplenic ascitic fluid.    Soft Tissues/Bones: There is mild compression deformity of an upper lumbar  vertebral body.    Impression  1. Resolution of the previously seen air in the right atrium.  2. Advanced cardiomegaly with small bilateral pleural effusions and slight  lower lung zone interlobular septal thickening.  3. Slight nodularity of the contour of the liver with mild perihepatic and  perisplenic ascitic fluid.  4. Mild compression deformity of an upper lumbar vertebral body.      CXR      2-view: Results for orders placed during the hospital encounter of 06/30/22    XR CHEST (2 VW)    Narrative  EXAMINATION:  CHEST.    CLINICAL HISTORY:  CONFUSION.    COMPARISONS:  6/2/2022.    TECHNIQUE:  AP and lateral.    FINDINGS: There is mild to moderate cardiomegaly. No definite evidence of pulmonary venous congestion. Chronic changes are noted in the left lower thorax. There is a dialysis catheter in place. There is a valve prosthesis in place. There are small  pleural effusions or blunting of both costophrenic angles.    Impression  NO SIGNIFICANT CHANGE SINCE THE PREVIOUS EXAM. THERE HAS BEEN PLACEMENT OF A DIALYSIS CATHETER SINCE THAT EXAM.      Results for orders placed during the hospital encounter of 04/20/22    XR CHEST (2 VW)    Narrative  EXAMINATION: XR CHEST (2 VW)    CLINICAL HISTORY: ABDOMINAL PAIN    COMPARISONS: CHEST RADIOGRAPH APRIL 8, 2022    FINDINGS: Median sternotomy.. Cardiopericardial silhouette upper limits normal, unchanged. Pulmonary vasculature normal. Right lung clear. Blunting left costophrenic angle again identified. Ill-defined area increased opacity lateral left lower chest  decreased since prior study.    Impression  BORDERLINE CARDIOMEGALY. LEFT PLEURAL EFFUSION VERSUS THICKENING. LEFT LOWER LUNG SCARRING VERSUS ATELECTASIS/PNEUMONIA.       Portable: Results for orders placed during the hospital  placed  Infectious diseases following continue antibiotics  Palliative care is following  DVT Prophylaxis   PUD Prophylaxis   Maintain blood sugar 140-180  Monitor electrolytes and replace as needed   Monitor urine output and avoid overload              Electronically signed by LORETO Ang - CNP,  [facility] ,on 8/7/2024 at 10:30 AM

## 2024-08-07 NOTE — PROGRESS NOTES
Infectious Diseases Inpatient Progress Note          HISTORY OF PRESENT ILLNESS:  Follow up septic and hypovolemic shock in a patient who was admitted with severe bleeding from left upper extremity AV fistula, requiring 2 units of packed red blood cells since admission, currently with hypoxic encephalopathy following cardiac arrest with lack of responsiveness despite being off sedation for several days, end-stage renal disease on hemodialysis, currently intubated, on Levophed at 11 mics, on treatment for Klebsiella pneumonia on assist-control 40% on IV meropenem, well tolerated.   No fevers  Patient remains to be responsive to painful stimulation  Blood status was changed to DNR limited.  There is a plan for hospice meeting  Patient is not able to get dialysis done because of lack of functional access  Remains visualized edema  No significant endotracheal secretions  Current Medications:     hydrocortisone sodium succinate PF  100 mg IntraVENous Q8H    meropenem  500 mg IntraVENous Q12H    docusate  100 mg Oral BID    polyethylene glycol  17 g Oral Daily    lactulose  20 g Oral TID    miconazole   Topical BID    chlorhexidine  15 mL Mouth/Throat BID    insulin lispro  0-16 Units SubCUTAneous Q4H    pantoprazole (PROTONIX) 40 mg in sodium chloride (PF) 0.9 % 10 mL injection  40 mg IntraVENous Daily    sodium chloride flush  5-40 mL IntraVENous 2 times per day    sodium chloride flush  5-40 mL IntraVENous 2 times per day    Virt-Caps  1 capsule Oral Daily    Vitamin D  2,000 Units Oral Daily    [Held by provider] ARIPiprazole  5 mg Oral Daily    fludrocortisone  0.1 mg Oral BID    [Held by provider] sertraline  50 mg Oral Daily    miconazole   Topical BID    epoetin chadwick-epbx  8,000 Units SubCUTAneous Once per day on Mon Wed Fri    sodium chloride flush  5-40 mL IntraVENous 2 times per day    insulin glargine  35 Units SubCUTAneous Nightly    midodrine  20 mg Oral TID WC    [Held by provider] budesonide-formoterol  2  puff Inhalation BID RT    And    [Held by provider] tiotropium  2 puff Inhalation Daily RT    pill splitter   Does not apply Once       Allergies:  Codeine and Oxycontin [oxycodone hcl]      Review of Systems  unable to provide ROS because of altered mentation      Physical Exam  Vitals:    08/07/24 0915 08/07/24 0930 08/07/24 0945 08/07/24 1000   BP:       Pulse: 74 76 82 73   Resp: 23 23 23 23   Temp: 97.5 °F (36.4 °C) 97.5 °F (36.4 °C) 97.5 °F (36.4 °C) 97.5 °F (36.4 °C)   TempSrc:       SpO2: 98% 98% 98% 98%   Weight:       Height:         General Appearance:   Intubated and unresponsive to verbal stimulation   On assist-control 40%   Intact ET and OG  Positive generalized edema  skin: warm and dry, no rash.   Head: normocephalic and atraumatic  Eyes: Positive icteric sclerae  Lungs: Assisted ventilation.  Fine rales bilaterally  Heart normal S1-S2  Abdomen: Soft, mild distention, no rigidity  Positive ecchymotic areas of the upper extremities  No erythema  Intact left femoral dialysis catheter  Intact left chest Kenyon catheter      DATA:    Lab Results   Component Value Date    WBC 24.0 (H) 08/07/2024    HGB 8.9 (L) 08/07/2024    HCT 27.4 (L) 08/07/2024    MCV 91.6 08/07/2024     (L) 08/07/2024     Lab Results   Component Value Date    CREATININE 3.63 (H) 08/07/2024    BUN 48 (H) 08/07/2024     08/07/2024    K 5.1 (H) 08/07/2024     08/07/2024    CO2 20 08/07/2024       Hepatic Function Panel:  Lab Results   Component Value Date/Time    ALKPHOS 108 08/02/2024 12:02 PM    ALT NOT DONE 08/02/2024 12:02 PM    AST NOT DONE 08/02/2024 12:02 PM    BILITOT 2.4 08/02/2024 12:02 PM    BILIDIR NOT DONE 08/02/2024 12:02 PM    IBILI 0.9 08/02/2024 12:02 PM       Microbiology:     No results for input(s): \"CULTRESP\" in the last 72 hours.  Sputum culture with Klebsiella that is sensitive to all antibiotics tested except for ampicillin    IMPRESSION:    Septic and hypovolemic shock with hypovolemic shock  related to active bleeding from left upper extremity AV fistula, remains to be on Levophed 11 mics with difficulty weaning  Gram-negative/Klebsiella pneumonia status post cardiopulmonary arrest and resuscitation, currently intubated on assist-control 40%  Acute encephalopathy status postcardiac arrest, probably hypoxic  Chronic antibiotic suppressive therapy on p.o. amoxicillin for left knee prosthetic joint infection and chronic osteomyelitis with Enterococcus  End-stage renal disease on hemodialysis with difficulty getting dialysis because of clotting dialysis access        PLAN:  Continue IV meropenem to cover for pneumonia and left knee chronic osteomyelitis.  Will start p.o. amoxicillin once done with IV meropenem  Awaiting final family decision  Agree with hospice consult  Follow-up blood culture results  Follow-up CBC  Vent and vasopressor support and weaning per intensivist team  Follow-up clinically    Discussed with FLAQUITO Rojo MD

## 2024-08-07 NOTE — PROGRESS NOTES
Hospitalist Progress Note      PCP: Adilene Terry MD    Date of Admission: 7/27/2024    Chief Complaint:  remains intubated, remains unresponsive off sedation, hypotensive requiring Norepinephrine support. Updated daughter and grandchild at bedside    Medications:  Reviewed    Infusion Medications    VASOpressin 20 Units in sodium chloride 0.9 % 100 mL infusion Stopped (08/05/24 0937)    norepinephrine 13 mcg/min (08/06/24 1806)    sodium chloride      sodium chloride      sodium chloride      dextrose       Scheduled Medications    hydrocortisone sodium succinate PF  100 mg IntraVENous Q8H    meropenem  500 mg IntraVENous Q12H    docusate  100 mg Oral BID    polyethylene glycol  17 g Oral Daily    lactulose  20 g Oral TID    miconazole   Topical BID    chlorhexidine  15 mL Mouth/Throat BID    insulin lispro  0-16 Units SubCUTAneous Q4H    pantoprazole (PROTONIX) 40 mg in sodium chloride (PF) 0.9 % 10 mL injection  40 mg IntraVENous Daily    sodium chloride flush  5-40 mL IntraVENous 2 times per day    sodium chloride flush  5-40 mL IntraVENous 2 times per day    Virt-Caps  1 capsule Oral Daily    Vitamin D  2,000 Units Oral Daily    [Held by provider] ARIPiprazole  5 mg Oral Daily    fludrocortisone  0.1 mg Oral BID    [Held by provider] sertraline  50 mg Oral Daily    miconazole   Topical BID    epoetin chadwick-epbx  8,000 Units SubCUTAneous Once per day on Mon Wed Fri    sodium chloride flush  5-40 mL IntraVENous 2 times per day    insulin glargine  35 Units SubCUTAneous Nightly    midodrine  20 mg Oral TID WC    [Held by provider] budesonide-formoterol  2 puff Inhalation BID RT    And    [Held by provider] tiotropium  2 puff Inhalation Daily RT    pill splitter   Does not apply Once     PRN Meds: sodium chloride flush **AND** sodium chloride flush, heparin (porcine), perflutren lipid microspheres, perflutren lipid microspheres, ipratropium 0.5 mg-albuterol 2.5 mg, naloxone 0.4 mg in 10 mL sodium chloride

## 2024-08-07 NOTE — PROGRESS NOTES
patient was connected to defibrillator and then intubated by ICU physician.  Sheath in the arm was removed and hemostasis was achieved by placement of a 2nd 2 0 Prolene pursestring suture.  Patient's vital signs and stabilized and she was transported back to ICU. FINDINGS: Initial fistulogram showed complete occlusion of the brachial artery to basilic vein fistula graft at the venous anastomosis.  Injection of contrast past the occlusion showed very sluggish venous flow and stasis in the dilated area of outflow vein.  Arterial anastomosis appeared widely patent.      Thrombosed fistula with complete occlusion at the venous anastomosis.  Due to deterioration patient's condition the procedure was aborted and not completed.      Vascular duplex hemodialysis access left     Result Date: 8/1/2024  PROCEDURE: IR FISTULAGRAM; SP THROMBECTOMY PERCUT AVF; ULTRASOUND OF THE UPPER EXTREMITY FOR VEIN MAPPING MODERATE CONSCIOUS SEDATION 7/31/2024 HISTORY: ORDERING SYSTEM PROVIDED HISTORY: Blood loss anemia, Chronic renal failure, unspecified CKD stage TECHNOLOGIST PROVIDED HISTORY: Reason for exam:->Bleeding from fistula What reading provider will be dictating this exam?->CRC; ORDERING SYSTEM PROVIDED HISTORY: Blood loss anemia, Chronic renal failure, unspecified CKD stage TECHNOLOGIST PROVIDED HISTORY: Reason for exam:->bleeding fistula What reading provider will be dictating this exam?->CRC; ORDERING SYSTEM PROVIDED HISTORY: ESRD TECHNIQUE: Duplex ultrasound using B-mode/gray scaled imaging, Doppler spectral analysis and color flow Doppler was obtained of the unilateral venous upper extremity. CONTRAST: 35 mL iodinated cond SEDATION: 0versed and 25 mcg fentanyl were titrated intravenously for moderate sedation monitored under my direction.  Total intraservice time of sedation was 30 minutes.  The patient's vital signs were monitored throughout the procedure and recorded in the patient's medical record by the nurseMinh Landry  effusion. Organs: Liver is homogeneous in appearance.  Stones in the gallbladder.  The pancreas is homogeneous.  The spleen is unremarkable.  Both adrenal glands are within normal limits.  Symmetric appearance of the kidneys.  No stones or distension seen in the renal collecting system. GI/Bowel: The stomach is mildly distended with air-fluid level present.  No wall thickening.  The small bowel is within normal limits.  No mucosal abnormality.  Some stool seen scattered diffusely throughout the colon.  The appendix is normal.  No signs of obvious obstruction or obstructing lesion. Pelvis: The bladder is incompletely distended.  The uterus has been surgically removed. Peritoneum/Retroperitoneum: No abdominal retroperitoneal lymphadenopathy. Vascular calcifications seen within the abdominal aorta and iliac vessels. Atherosclerotic disease seen within the mesenteric vessels.  Large volume ascites.  Similar and unchanged when compared to the prior study.  No extraluminal air. Bones/Soft Tissues: Bony structures reveal multilevel degenerative changes seen within the spine.  There is anasarca seen throughout the soft tissues similar and unchanged when compared to the prior examination.      1. Large volume ascites similar and unchanged when compared to the prior examination. 2. Small bilateral pleural effusions. 3. Cardiomegaly with mild interstitial edema at the lung bases. 4. Cholelithiasis. 5. Anasarca seen throughout the soft tissues similar and unchanged when compared to the prior examination.      IR US GUIDED PARACENTESIS     1.  Status post technically successful ultrasound-guided paracentesis.  CLINICAL HISTORY:VELASQUEZ GARBER is a Female of 64 years age, referred for Ultrasound Guided Paracentesis.  PROCEDURE: Survey of the abdomen showed large amount of ascites fluid. After obtaining informed consent, the patient was positioned supine on the sonography table. Using ultrasound, the skin over the left  hemiabdomen was locally anesthetized with 1% lidocaine.  Following that, a Yueh needle was advanced into the fluid pocket using ultrasound visualization. 7600 cc, of clear yellow fluid were aspirated and sent for cytology, and pathology.  The needle was removed, and hemostasis was obtained with pressure.  A Band-Aid was placed. Post procedure images did not demonstrate hemorrhage at the target site. The patient tolerated the procedure well. The patient left the department in good condition. A radiology nurse was in presence monitoring vital signs, assisting throughout the procedure. Electronically signed by Eze White        EKG: normal sinus rhythm, RBBB        2D Echo:      07/27/24     ECHO (TTE) COMPLETE (PRN CONTRAST/BUBBLE/STRAIN/3D) 08/01/2024  8:14 AM (Final)     Interpretation Summary    Left Ventricle: Normal left ventricular systolic function with a visually estimated EF of 55 - 60%. Left ventricle size is normal. Normal wall thickness. Normal wall motion. Diastolic dysfunction present with increased LAP with normal LV EF.    Aortic Valve: Moderately thickened cusps. Moderate sclerosis of the aortic valve cusps. Mild regurgitation with a centrally directed jet.    Mitral Valve: Moderately thickened leaflets. Mild regurgitation with a centrally directed jet. Mild stenosis noted.    Tricuspid Valve: Mild regurgitation with a centrally directed jet. Mildly elevated RVSP, consistent with mild pulmonary hypertension. The estimated RVSP is 42 mmHg.    Left Atrium: Left atrium is severely dilated.    Image quality is adequate. Technically difficult study due to patient's body habitus and procedure performed with the patient in a supine position.     Signed by: Morteza WOODS MD on 8/1/2024  8:14 AM        Stress Test:      No results found for this or any previous visit.  Echo (TTE) complete 07/31/2024     Interpretation Summary    Left Ventricle: Normal left ventricular systolic function with a visually  access site bleed.  Resume when okay with team, hemoglobin is stable  Continue to monitor on telemetry  GI/DVT prophylaxis  Continue test between 4-5 magnesium greater than 2  Nephrology recommendations  Prognosis is very poor  Palliative care/hospice recommendationS     Thank you for allowing me to participate in the care of your patient, please don't hesitate to contact me if you have any further questions.      Electronically signed by Virgilio Howell DO on 8/7/2024 at 10:57 AM
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
## 2024-08-07 NOTE — PROGRESS NOTES
Nephrology Progress Note    Assessment:  ESRDX  further dialysis on hold  Hypotension  on pressors  Hx Seizures  unresponsive      Plan:no further dialysis  will sign off case    Patient Active Problem List:     Coronary artery disease involving native coronary artery of native heart with angina pectoris (HCC)     Schizophrenia, paranoid, chronic     Metabolic syndrome     Vitamin B 12 deficiency     Cerebral microvascular disease     Mixed hyperlipidemia     Other hammer toe (acquired)     Vitamin D insufficiency     Incontinence of urine     Diabetic nephropathy with proteinuria (HCC)     Essential (primary) hypertension     History of type C viral hepatitis     Urinary incontinence due to cognitive impairment     History of seizures     Stented coronary artery-plan is to stay on Plavix indefinately per Dr Walker     Diabetes mellitus ([facility])     Controlled type 2 diabetes mellitus with diabetic neuropathy, with long-term current use of insulin ([facility])     Hemiparesis, left ([facility])     Angina, class II ([facility])     Pain, unspecified     Tardive dyskinesia     Shortness of breath     Poorly controlled diabetes mellitus ([facility])     Chronic diastolic congestive heart failure ([facility])     [place] (obstructive sleep apnea)     Pulmonary hypertension, unspecified ([facility])     Class 2 severe obesity with serious comorbidity and body mass index (BMI) of 36.0 to 36.9 in adult ([facility])     Edema     Closed supracondylar fracture of right humerus     Other chronic pain     Palliative care patient     Recurrent falls     Chronic renal failure     Difficulty in walking     ESRD (end stage renal disease) on dialysis ([facility])     Weakness     Other seizures ([facility])     Moderate persistent asthma without complication     COVID-19     Post PTCA     Falls frequently     Chest wall contusion, left, initial encounter     Headache, unspecified     Paranoid schizophrenia (HCC)     Compression fracture of spine ([facility])     Closed rib fracture     Depression      1,000 mL/hr (08/05/24 1843)    prismaSATE BGK 4/2.5 1,000 mL/hr (08/06/24 0509)    prismaSATE BGK 4/2.5 1,000 mL/hr (08/06/24 0509)    VASOpressin 20 Units in sodium chloride 0.9 % 100 mL infusion Stopped (08/05/24 0937)    norepinephrine 13 mcg/min (08/06/24 1806)    sodium chloride      sodium chloride      sodium chloride      dextrose         CBC:   Recent Labs     08/06/24  0546 08/06/24  1003 08/06/24  1420 08/07/24  0543   WBC 23.0*  --   --  24.0*   HGB 8.9*   < > 8.7* 8.9*   PLT 78*  --   --  103*    < > = values in this interval not displayed.     CMP:    Recent Labs     08/05/24  0539 08/06/24  0541 08/06/24  1003 08/06/24  1155 08/06/24  1420 08/07/24  0549    135  --   --   --  140   K 5.5* 4.8  --   --   --  5.1*    99  --   --   --  103   CO2 14* 18*  --   --   --  20   BUN 50* 38*  --   --   --  48*   CREATININE 4.71* 3.13*   < > 3.0* 3.2* 3.63*   GLUCOSE 208* 127*  --   --   --  134*   CALCIUM 9.5 9.6  --   --   --  10.3*   LABGLOM 9.6* 15.7*   < > 17* 15* 13.2*    < > = values in this interval not displayed.     Troponin: No results for input(s): \"TROPONINI\" in the last 72 hours.  BNP: No results for input(s): \"BNP\" in the last 72 hours.  INR: No results for input(s): \"INR\" in the last 72 hours.  Lipids: No results for input(s): \"CHOL\", \"LDLDIRECT\", \"TRIG\", \"HDL\", \"AMYLASE\", \"LIPASE\" in the last 72 hours.  Liver: No results for input(s): \"AST\", \"ALT\", \"ALKPHOS\", \"LABALBU\", \"BILITOT\" in the last 72 hours.    Invalid input(s): \"PROT\", \"BILDIR\"  Iron:  No results for input(s): \"FERRITIN\" in the last 72 hours.    Invalid input(s): \"IRONS\", \"LABIRONS\"  Urinalysis: No results for input(s): \"UA\" in the last 72 hours.    Objective:  Vitals: BP (!) 110/53   Pulse 72   Temp 97.5 °F (36.4 °C)   Resp 23   Ht 1.727 m (5' 8\")   Wt 96.7 kg (213 lb 3 oz)   LMP  (LMP Unknown)   SpO2 98%   BMI 32.41 kg/m²    Wt Readings from Last 3 Encounters:   08/07/24 96.7 kg (213 lb 3 oz)   07/24/24 98.4 kg  (217 lb)   07/15/24 98.4 kg (217 lb)      24HR INTAKE/OUTPUT:    Intake/Output Summary (Last 24 hours) at 8/7/2024 0909  Last data filed at 8/6/2024 2000  Gross per 24 hour   Intake 168.08 ml   Output 150 ml   Net 18.08 ml       General: not alert, in no apparent distress  HEENT: normocephalic, atraumatic, anicteric  Neck: supple, no mass  Lungs: non-labored respirations, clear to auscultation bilaterally  Heart: regular rate and rhythm, no murmurs or rubs  Abdomen: soft, non-tender, non-distended  Ext: no cyanosis, no peripheral edema  Neuro: alert and oriented, no gross abnormalities  Psych: normal mood and affect  Skin: no rash      Electronically signed by Jaden Finch DO

## 2024-08-07 NOTE — PLAN OF CARE
Problem: Cardiovascular - Adult  Goal: Maintains optimal cardiac output and hemodynamic stability  Recent Flowsheet Documentation  Taken 8/6/2024 2000 by Jodi Parker RN  Maintains optimal cardiac output and hemodynamic stability:   Monitor blood pressure and heart rate   Monitor urine output and notify Licensed Independent Practitioner for values outside of normal range   Assess for signs of decreased cardiac output   Administer fluid and/or volume expanders as ordered   Administer vasoactive medications as ordered     Problem: Skin/Tissue Integrity - Adult  Goal: Skin integrity remains intact  Recent Flowsheet Documentation  Taken 8/6/2024 2000 by Jodi Parker RN  Skin Integrity Remains Intact: Monitor for areas of redness and/or skin breakdown     Problem: Neurosensory - Adult  Goal: Achieves stable or improved neurological status  Recent Flowsheet Documentation  Taken 8/6/2024 2000 by Jodi Parker RN  Achieves stable or improved neurological status:   Assess for and report changes in neurological status   Initiate measures to prevent increased intracranial pressure   Maintain blood pressure and fluid volume within ordered parameters to optimize cerebral perfusion and minimize risk of hemorrhage     Problem: Discharge Planning  Goal: Discharge to home or other facility with appropriate resources  Outcome: Not Progressing  Flowsheets (Taken 8/6/2024 2000)  Discharge to home or other facility with appropriate resources: Identify barriers to discharge with patient and caregiver     Problem: Skin/Tissue Integrity  Goal: Absence of new skin breakdown  Description: 1.  Monitor for areas of redness and/or skin breakdown  2.  Assess vascular access sites hourly  3.  Every 4-6 hours minimum:  Change oxygen saturation probe site  4.  Every 4-6 hours:  If on nasal continuous positive airway pressure, respiratory therapy assess nares and determine need for appliance change or resting period.  Outcome: Not  Progressing

## 2024-08-07 NOTE — PROGRESS NOTES
Palliative Care Progress Note  Patient: Michelle Le  Gender: female  YOB: 1960  Unit/Bed: IC16/IC16-01  CodeStatus: Full Code  Inpatient Treatment Team: Treatment Team: Attending Provider: Chan Rai MD; Consulting Physician: Gabriella Almazan MD; Consulting Physician: Eze White MD; Consulting Physician: Carmen Pelletier MD; Consulting Physician: Virgilio Howell DO; Utilization Reviewer: Sonu Moreno RN; Consulting Physician: Darren Guadarrama MD; Consulting Physician: Say Marsh MD; Advanced Practice Nurse: Milagros Hanna APRN - CNP; Utilization Reviewer: Della Mock RN; Consulting Physician: Autumn Rojo MD; Consulting Physician: Jaden Finch DO; Registered Nurse: Catrina Johnson RN; : Irais Nath  Admit Date:  7/27/2024    Chief Complaint: Blood loss anemia    History of Presenting Illness:      Michelle Le is a 64 y.o. female on hospital day 11 with a history of CAD s/p CABG/PCI, s/p TV repair, PAF/AFL, CVA with left sided weakness, IDDM2, ESRD on HD, anemia, schizophrenia, COVID-19 pneumonia, obesity, T11 fracture, hypogammaglobulinemia, E.Faecalis BSI in 7/2022, chronic left knee PJI/OM on chronic suppressive therapy with Amoxicillin.    Patient was brought to the emergency room via EMS from home for HD site bleeding. Patient hypotensive upon arrival. Patient was admitted in the setting of esrd, acute blood loss anemia, acute/chronic hypotension, ascites,     Palliative care was consulted by Dr. Ortiz for goals of care.     Patient is intubated and sedated, CRRT running. Patient was found to be in PEA, rosc achieved after 1 round compression performed. Required units of blood. Daughter currently at bedside with patient. Patient's previous functional status is criss lift per daughter at bedside. Patient has had overall functional decline in the last several months, multiple hospitalizations.  Patient had lengthy stay at [facility] went  in sterile fashion using chlorhexidine maximum sterile barrier.  1% lidocaine was infiltrated around the bleeding wound as pressure was held on the fistula to decrease bleeding.  A 2-0 prolene suture was then placed in a pursestring fashion around the bleeding wound and hemostasis was achieved. Next ultrasound evaluation of the fistula was performed showing occlusion near the venous anastomosis.  No pseudoaneurysm was seen.  1% lidocaine was infiltrated and micropuncture access in antegrade direction was obtained in the mid fistula.  Micro sheath was inserted.  Through this a fistulogram was performed showing complete occlusion of the fistula at the venous anastomosis.  A 0.35 guidewire was then advanced over which a 6 [place] sheath was inserted.  Guidewire was able to be advanced through the occlusion into the subclavian vein.  Over wire through the sheath a 7 mm balloon was advanced and used to angioplasty the venous anastomosis where she had previous a stenosis.  During the angioplasty it was noted that the patient has O2 saturation, blood pressure and heart rate began to decrease.  The procedure was halted and a cold was immediately called.  CPR was initiated, epinephrine was given patient was connected to defibrillator and then intubated by ICU physician.  Sheath in the arm was removed and hemostasis was achieved by placement of a 2nd 2 0 Prolene pursestring suture.  Patient's vital signs and stabilized and she was transported back to ICU. FINDINGS: Initial fistulogram showed complete occlusion of the brachial artery to basilic vein fistula graft at the venous anastomosis.  Injection of contrast past the occlusion showed very sluggish venous flow and stasis in the dilated area of outflow vein.  Arterial anastomosis appeared widely patent.     Thrombosed fistula with complete occlusion at the venous anastomosis.  Due to deterioration patient's condition the procedure was aborted and not completed.     IR

## 2024-08-08 VITALS
WEIGHT: 213.19 LBS | BODY MASS INDEX: 32.31 KG/M2 | SYSTOLIC BLOOD PRESSURE: 118 MMHG | OXYGEN SATURATION: 99 % | HEIGHT: 68 IN | RESPIRATION RATE: 20 BRPM | HEART RATE: 65 BPM | DIASTOLIC BLOOD PRESSURE: 22 MMHG | TEMPERATURE: 97.2 F

## 2024-08-08 PROBLEM — N18.6 ESRD (END STAGE RENAL DISEASE) (HCC): Status: ACTIVE | Noted: 2024-08-08

## 2024-08-08 LAB
ALBUMIN SERPL-MCNC: 2.5 G/DL (ref 3.5–4.6)
ANION GAP SERPL CALCULATED.3IONS-SCNC: 15 MEQ/L (ref 9–15)
BACTERIA BLD CULT ORG #2: NORMAL
BACTERIA BLD CULT: NORMAL
BASOPHILS # BLD: 0 K/UL (ref 0–0.2)
BASOPHILS NFR BLD: 0.1 %
BUN SERPL-MCNC: 57 MG/DL (ref 8–23)
CALCIUM SERPL-MCNC: 9.6 MG/DL (ref 8.5–9.9)
CHLORIDE SERPL-SCNC: 104 MEQ/L (ref 95–107)
CO2 SERPL-SCNC: 20 MEQ/L (ref 20–31)
CREAT SERPL-MCNC: 3.96 MG/DL (ref 0.5–0.9)
EOSINOPHIL # BLD: 0 K/UL (ref 0–0.7)
EOSINOPHIL NFR BLD: 0 %
ERYTHROCYTE [DISTWIDTH] IN BLOOD BY AUTOMATED COUNT: 23.3 % (ref 11.5–14.5)
GLUCOSE BLD-MCNC: 103 MG/DL (ref 70–99)
GLUCOSE BLD-MCNC: 126 MG/DL (ref 70–99)
GLUCOSE BLD-MCNC: 139 MG/DL (ref 70–99)
GLUCOSE BLD-MCNC: 188 MG/DL (ref 70–99)
GLUCOSE SERPL-MCNC: 130 MG/DL (ref 70–99)
HCT VFR BLD AUTO: 24.9 % (ref 37–47)
HGB BLD-MCNC: 7.8 G/DL (ref 12–16)
LYMPHOCYTES # BLD: 1.5 K/UL (ref 1–4.8)
LYMPHOCYTES NFR BLD: 9.4 %
MCH RBC QN AUTO: 29.3 PG (ref 27–31.3)
MCHC RBC AUTO-ENTMCNC: 31.3 % (ref 33–37)
MCV RBC AUTO: 93.6 FL (ref 79.4–94.8)
MONOCYTES # BLD: 0.8 K/UL (ref 0.2–0.8)
MONOCYTES NFR BLD: 5.1 %
NEUTROPHILS # BLD: 12.8 K/UL (ref 1.4–6.5)
NEUTS SEG NFR BLD: 81.3 %
PERFORMED ON: ABNORMAL
PHOSPHATE SERPL-MCNC: 6.5 MG/DL (ref 2.3–4.8)
PLATELET # BLD AUTO: 69 K/UL (ref 130–400)
POTASSIUM SERPL-SCNC: 5.6 MEQ/L (ref 3.4–4.9)
RBC # BLD AUTO: 2.66 M/UL (ref 4.2–5.4)
SODIUM SERPL-SCNC: 139 MEQ/L (ref 135–144)
WBC # BLD AUTO: 15.8 K/UL (ref 4.8–10.8)

## 2024-08-08 PROCEDURE — 2580000003 HC RX 258: Performed by: ANESTHESIOLOGY

## 2024-08-08 PROCEDURE — 85025 COMPLETE CBC W/AUTO DIFF WBC: CPT

## 2024-08-08 PROCEDURE — 6360000002 HC RX W HCPCS: Performed by: NURSE PRACTITIONER

## 2024-08-08 PROCEDURE — 6360000002 HC RX W HCPCS: Performed by: STUDENT IN AN ORGANIZED HEALTH CARE EDUCATION/TRAINING PROGRAM

## 2024-08-08 PROCEDURE — 6370000000 HC RX 637 (ALT 250 FOR IP): Performed by: COLON & RECTAL SURGERY

## 2024-08-08 PROCEDURE — 99291 CRITICAL CARE FIRST HOUR: CPT | Performed by: INTERNAL MEDICINE

## 2024-08-08 PROCEDURE — 94003 VENT MGMT INPAT SUBQ DAY: CPT

## 2024-08-08 PROCEDURE — 36415 COLL VENOUS BLD VENIPUNCTURE: CPT

## 2024-08-08 PROCEDURE — 89220 SPUTUM SPECIMEN COLLECTION: CPT

## 2024-08-08 PROCEDURE — 2580000003 HC RX 258: Performed by: COLON & RECTAL SURGERY

## 2024-08-08 PROCEDURE — 6370000000 HC RX 637 (ALT 250 FOR IP): Performed by: NURSE PRACTITIONER

## 2024-08-08 PROCEDURE — 2580000003 HC RX 258: Performed by: INTERNAL MEDICINE

## 2024-08-08 PROCEDURE — 99233 SBSQ HOSP IP/OBS HIGH 50: CPT | Performed by: INTERNAL MEDICINE

## 2024-08-08 PROCEDURE — 6360000002 HC RX W HCPCS: Performed by: INTERNAL MEDICINE

## 2024-08-08 PROCEDURE — 6370000000 HC RX 637 (ALT 250 FOR IP): Performed by: INTERNAL MEDICINE

## 2024-08-08 PROCEDURE — 80069 RENAL FUNCTION PANEL: CPT

## 2024-08-08 PROCEDURE — 95816 EEG AWAKE AND DROWSY: CPT | Performed by: PSYCHIATRY & NEUROLOGY

## 2024-08-08 RX ORDER — POLYETHYLENE GLYCOL 3350 17 G/17G
17 POWDER, FOR SOLUTION ORAL DAILY
OUTPATIENT
Start: 2024-08-09

## 2024-08-08 RX ORDER — ACETAMINOPHEN 325 MG/1
650 TABLET ORAL EVERY 6 HOURS PRN
OUTPATIENT
Start: 2024-08-08

## 2024-08-08 RX ORDER — ALBUTEROL SULFATE 0.83 MG/ML
2.5 SOLUTION RESPIRATORY (INHALATION) EVERY 4 HOURS PRN
OUTPATIENT
Start: 2024-08-08

## 2024-08-08 RX ORDER — FLUDROCORTISONE ACETATE 0.1 MG/1
0.1 TABLET ORAL 2 TIMES DAILY
OUTPATIENT
Start: 2024-08-08

## 2024-08-08 RX ORDER — HYDROMORPHONE HYDROCHLORIDE 1 MG/ML
1 INJECTION, SOLUTION INTRAMUSCULAR; INTRAVENOUS; SUBCUTANEOUS EVERY 4 HOURS PRN
OUTPATIENT
Start: 2024-08-08

## 2024-08-08 RX ORDER — CHLORHEXIDINE GLUCONATE ORAL RINSE 1.2 MG/ML
15 SOLUTION DENTAL 2 TIMES DAILY
OUTPATIENT
Start: 2024-08-08

## 2024-08-08 RX ORDER — MICONAZOLE NITRATE 20 MG/G
CREAM TOPICAL 2 TIMES DAILY
OUTPATIENT
Start: 2024-08-08 | End: 2024-08-15

## 2024-08-08 RX ORDER — INSULIN LISPRO 100 [IU]/ML
0-16 INJECTION, SOLUTION INTRAVENOUS; SUBCUTANEOUS EVERY 4 HOURS
OUTPATIENT
Start: 2024-08-08

## 2024-08-08 RX ORDER — ACETAMINOPHEN 650 MG/1
650 SUPPOSITORY RECTAL EVERY 6 HOURS PRN
OUTPATIENT
Start: 2024-08-08

## 2024-08-08 RX ORDER — INSULIN GLARGINE 100 [IU]/ML
35 INJECTION, SOLUTION SUBCUTANEOUS NIGHTLY
OUTPATIENT
Start: 2024-08-08

## 2024-08-08 RX ORDER — MIDODRINE HYDROCHLORIDE 10 MG/1
20 TABLET ORAL
OUTPATIENT
Start: 2024-08-08

## 2024-08-08 RX ORDER — IPRATROPIUM BROMIDE AND ALBUTEROL SULFATE 2.5; .5 MG/3ML; MG/3ML
1 SOLUTION RESPIRATORY (INHALATION) EVERY 4 HOURS PRN
OUTPATIENT
Start: 2024-08-08

## 2024-08-08 RX ORDER — LACTULOSE 10 G/15ML
20 SOLUTION ORAL 3 TIMES DAILY
OUTPATIENT
Start: 2024-08-08

## 2024-08-08 RX ORDER — HYDROMORPHONE HYDROCHLORIDE 1 MG/ML
1 INJECTION, SOLUTION INTRAMUSCULAR; INTRAVENOUS; SUBCUTANEOUS EVERY 4 HOURS PRN
Status: DISCONTINUED | OUTPATIENT
Start: 2024-08-08 | End: 2024-08-08 | Stop reason: HOSPADM

## 2024-08-08 RX ADMIN — NEPHROCAP 1 MG: 1 CAP ORAL at 08:42

## 2024-08-08 RX ADMIN — Medication 10 ML: at 08:45

## 2024-08-08 RX ADMIN — HYDROCORTISONE SODIUM SUCCINATE 100 MG: 100 INJECTION, POWDER, FOR SOLUTION INTRAMUSCULAR; INTRAVENOUS at 12:21

## 2024-08-08 RX ADMIN — MICONAZOLE NITRATE: 2 POWDER TOPICAL at 08:42

## 2024-08-08 RX ADMIN — HYDROCORTISONE SODIUM SUCCINATE 100 MG: 100 INJECTION, POWDER, FOR SOLUTION INTRAMUSCULAR; INTRAVENOUS at 04:43

## 2024-08-08 RX ADMIN — HYDROMORPHONE HYDROCHLORIDE 1 MG: 1 INJECTION, SOLUTION INTRAMUSCULAR; INTRAVENOUS; SUBCUTANEOUS at 12:19

## 2024-08-08 RX ADMIN — MIDODRINE HYDROCHLORIDE 20 MG: 10 TABLET ORAL at 08:41

## 2024-08-08 RX ADMIN — ACETAMINOPHEN 325MG 650 MG: 325 TABLET ORAL at 10:39

## 2024-08-08 RX ADMIN — DOCUSATE SODIUM 100 MG: 50 LIQUID ORAL at 08:42

## 2024-08-08 RX ADMIN — SODIUM ZIRCONIUM CYCLOSILICATE 10 G: 10 POWDER, FOR SUSPENSION ORAL at 09:11

## 2024-08-08 RX ADMIN — MIDODRINE HYDROCHLORIDE 20 MG: 10 TABLET ORAL at 12:32

## 2024-08-08 RX ADMIN — SODIUM CHLORIDE, PRESERVATIVE FREE 40 MG: 5 INJECTION INTRAVENOUS at 08:41

## 2024-08-08 RX ADMIN — LACTULOSE 20 G: 20 SOLUTION ORAL at 08:44

## 2024-08-08 RX ADMIN — CHLORHEXIDINE GLUCONATE 15 ML: 1.2 RINSE ORAL at 08:43

## 2024-08-08 RX ADMIN — MEROPENEM 500 MG: 500 INJECTION, POWDER, FOR SOLUTION INTRAVENOUS at 12:24

## 2024-08-08 RX ADMIN — FLUDROCORTISONE ACETATE 0.1 MG: 0.1 TABLET ORAL at 08:42

## 2024-08-08 RX ADMIN — Medication 2000 UNITS: at 08:41

## 2024-08-08 RX ADMIN — MICONAZOLE NITRATE: 20 CREAM TOPICAL at 08:42

## 2024-08-08 ASSESSMENT — PULMONARY FUNCTION TESTS
PIF_VALUE: 26
PIF_VALUE: 24
PIF_VALUE: 26
PIF_VALUE: 25
PIF_VALUE: 25
PIF_VALUE: 27
PIF_VALUE: 28
PIF_VALUE: 26
PIF_VALUE: 27
PIF_VALUE: 26
PIF_VALUE: 25
PIF_VALUE: 26
PIF_VALUE: 27
PIF_VALUE: 25
PIF_VALUE: 25
PIF_VALUE: 27
PIF_VALUE: 26
PIF_VALUE: 26
PIF_VALUE: 27
PIF_VALUE: 25
PIF_VALUE: 27
PIF_VALUE: 27
PIF_VALUE: 26
PIF_VALUE: 25
PIF_VALUE: 26
PIF_VALUE: 25
PIF_VALUE: 28
PIF_VALUE: 26
PIF_VALUE: 27
PIF_VALUE: 25
PIF_VALUE: 28
PIF_VALUE: 26
PIF_VALUE: 27
PIF_VALUE: 25
PIF_VALUE: 25
PIF_VALUE: 26
PIF_VALUE: 25
PIF_VALUE: 27
PIF_VALUE: 26
PIF_VALUE: 25
PIF_VALUE: 27
PIF_VALUE: 26

## 2024-08-08 ASSESSMENT — PAIN SCALES - WONG BAKER
WONGBAKER_NUMERICALRESPONSE: NO HURT
WONGBAKER_NUMERICALRESPONSE: NO HURT

## 2024-08-08 ASSESSMENT — PAIN SCALES - GENERAL
PAINLEVEL_OUTOF10: 0

## 2024-08-08 NOTE — CARE COORDINATION
Team ICU rounds done this am and dtr met with [facility]. Pt to tx to [facility] center for compassion wean once set up.

## 2024-08-08 NOTE — CONSULTS
[facility] met with pt's family. Consents were signed with transport arranged for 1500 today [facility]. Plan for pt to be compassionately weaned on Saturday 8/10/24 at [id number]

## 2024-08-08 NOTE — PROGRESS NOTES
Pt blood pressure decreased when moved to cart for transport to [facility].Titrated levo for b/p. Family at bedside and aware of drop in blood pressure. Last b/p 102/46 [id number] O2 sats 99% and heart rate 72 SR. Pt transported to unit with family at side.

## 2024-08-08 NOTE — PROGRESS NOTES
Pulmonary & Critical Care Medicine ICU Progress Note  Chief complaint : Hypotension     Subjunctive/24 hour events :   Patient seen and examined during multidisciplinary rounds with RN, charge nurse, RT, pharmacy, dietitian, and social service.   Patient continues on the ventilator FiO2 is 40% with 5 of PEEP O2 sats 98%.  She continues on Levophed at 2 mcg/min.  No sedation for approximately 5 days.  She is an uric and unable to do dialysis at this time.  She is not following any commands.  She is tolerating tube feed and bowels are moving.  Family is at bedside, speaking with hospice.  Social History     Tobacco Use    Smoking status: Never     Passive exposure: Never    Smokeless tobacco: Never   Substance Use Topics    Alcohol use: No     Alcohol/week: 0.0 standard drinks of alcohol         Problem Relation Age of Onset    Cancer Mother 68        survived    Breast Cancer Mother     Hypertension Father     Diabetes Sister     Mental Illness Sister     Colon Cancer Neg Hx        Recent Labs     08/06/24  1155 08/06/24  1420   PHART 7.524* 7.431   VCW0GSE 29* 34*   PO2ART 185* 178*         MV Settings:  Vent Mode: AC/VC Resp Rate (Set): 20 bpm/Vt (Set, mL): 350 mL/ /FiO2 : 40 %           IV:   VASOpressin 20 Units in sodium chloride 0.9 % 100 mL infusion Stopped (08/05/24 0937)    norepinephrine 2 mcg/min (08/08/24 0606)    sodium chloride      sodium chloride      sodium chloride      dextrose         Vitals:  BP (!) 112/46   Pulse 63   Temp (!) 95.7 °F (35.4 °C)   Resp 20   Ht 1.727 m (5' 8\")   Wt 96.7 kg (213 lb 3 oz)   LMP  (LMP Unknown)   SpO2 98%   BMI 32.41 kg/m²    Tmax:       Intake/Output Summary (Last 24 hours) at 8/8/2024 1052  Last data filed at 8/8/2024 0606  Gross per 24 hour   Intake 979.24 ml   Output --   Net 979.24 ml         EXAM:    General: Unresponsive  Head: normocephalic, atraumatic  Eyes:No gross abnormalities.  ENT:  MMM no lesions  Neck:  supple and no masses  Chest : Fair air  for exam:->intubation/OG tube placement  What reading provider will be dictating this exam?->CRC    FINDINGS:  No change in the arm right subclavian central line.    Status post intubation with the tip of the endotracheal tube approximately 3  cm from the tabitha.    There is an NG tube coursing to the stomach in good position.    The lungs and pleural spaces are without acute focal process.  Low  inspiratory volume study.    The cardiomediastinal silhouette is enlarged but unchanged.    There is no evidence of pneumothorax.    The osseous structures are without acute process.    Impression  No acute process.  Status post intubation, there is an NG tube in good  position.      Echo No results found for this or any previous visit.        Assessment:  This is a critically ill patient at risk of deterioration / death , needing close ICU monitoring and intervention due to below noted problems   Acute blood loss anemia, bleeding fistula site, resolved   S/P cardiac arrest, emergently intubated   Septic shock  Acute encephalopathy  Chronic anemia baseline 10.0   ESRD dialysis M-W-F, bleeding fistula    Adrenal insuffiencey, on hydrocortisone at home  Ascites   Hyponatremia, resolved  Hyperkalemia  Recommendations   Vent support lung protective strategy  head of the bed 30°  Watch for ICU delirium: TV on, natural light, avoid benzos, pain control, early mobility, and family engagement  Neurology is following EEG completed, poor prognosis  Wean Levophed for MAP greater than 65  Continue tube feeding  Nephrology is following left AV fistula nonfunctioning, unable to obtain access for dialysis family is aware multiple temporary lines have been placed  Infectious diseases following continue antibiotics  Palliative care is following  DVT Prophylaxis   PUD Prophylaxis   Maintain blood sugar 140-180  Monitor electrolytes and replace as needed   Monitor urine output and avoid overload    Hospice meeting

## 2024-08-08 NOTE — PROGRESS NOTES
Spiritual Health Assessment/Progress Note  [facility]    (P) Follow-up, Spiritual/Emotional Needs, Code Blue,  ,      Name: Michelle Le MRN: [id number]    Age: 64 y.o.     Sex: female   Language: English   Evangelical: [employer]   Blood loss anemia     Date: 8/8/2024            Total Time Calculated: (P) 7 min              was requested to pray with friends, for pt. And her peace.     Spiritual Assessment continued in [facility] ICU        Referral/Consult From: (P) Friend   Encounter Overview/Reason: (P) Follow-up, Spiritual/Emotional Needs  Service Provided For: (P) Patient and family together    Pat, Belief, Meaning:   Patient unable to communicate at this time  Family/Friends identify as spiritual and are connected with a pat tradition or spiritual practice      Importance and Influence:  Patient unable to communicate at this time  Family/Friends have spiritual/personal beliefs that influence decisions regarding the patient's health    Community:  Patient is connected with a spiritual community and feels well-supported. Support system includes: Pat Community  Family/Friends are connected with a spiritual community: and feel well-supported. Support system includes: Pat Community and Friends    Assessment and Plan of Care:     Patient Interventions include: Other: NA  Family/Friends Interventions include: Family/Friends Interventions and Explored spiritual coping/struggle/distress and theological reflection    Patient Plan of Care: Spiritual Care available upon further referral  Family/Friends Plan of Care: Spiritual Care available upon further referral    Electronically signed by Chaplain David on 8/8/2024 at 2:50 PM

## 2024-08-08 NOTE — PROGRESS NOTES
CONSULTANT  Virgilio Howell DO      REQUESTING PHYSICIAN  Werner Ortiz MD                REASON FOR CONSULTATION       Chief Complaint   Patient presents with    Hypotension       Picked at HD site and started bleeding, hypotensive when EMS arrived         Hospital Day: 5         Patient is a 64 y.o. female who presents with a chief complaint of hypotension. Patient is followed on a regular basis by Adilene Tom MD. patient with multiple medical problems who presented with hypotension from dialysis center in the setting of acute bleeding from hemodialysis access she is currently on the vent and sedated.  Most of the information was obtained from the staff as well as the chart.  Patient apparently had a PEA arrest during IR intervention.  Had 1 round of CPR and 1 mg of epinephrine given  Patient with history of paroxysmal atrial fibrillation.  Had brief episode of A-fib however currently she is in normal sinus rhythm on telemetry    8-2-24: Remains on vent and sedated.  On Levophed and about to initiate vasopressin.  Patient on CRRT.  Also on normothermia protocol.  Appears to be in A-fib on telemetry.  Status post echocardiogram yesterday with ejection fraction of 55 to 60%    8/5/2024: Patient remains on vent.  No purposeful movements.  On Levophed as well as vasopressin.  Normal sinus rhythm on telemetry    8-7-24: Patient on vent.  Discussed with neurology.  Recommended hospice.  Normal sinus rhythm inflammatory.  On Levophed    8/8/2024: Remains on Levophed.  On vent.  Normal sinus rhythm on telemetry     Past Medical History        Past Medical History:   Diagnosis Date    Angina at rest ([facility]) 5/24/2020    Anxiety      CAD S/P percutaneous coronary angioplasty 2015, 2018     stents per Huong Sanabria    CHF (congestive heart failure) ([facility])      CKD (chronic kidney disease) stage 4, GFR 15-29 ml/min ([facility]) 2/24/2018    CKD stage 4 due to type 2 diabetes mellitus ([facility])      Contusion of right  Other hammer toe (acquired)    Vitamin D insufficiency    Incontinence of urine    Diabetic nephropathy with proteinuria ([facility])    Essential (primary) hypertension    History of type C viral hepatitis    Urinary incontinence due to cognitive impairment    History of seizures    Stented coronary artery-plan is to stay on Plavix indefinately per Dr Walker    Diabetes mellitus ([facility])    Controlled type 2 diabetes mellitus with diabetic neuropathy, with long-term current use of insulin ([facility])    Hemiparesis, left ([facility])    Angina, class II ([facility])    Pain, unspecified    Tardive dyskinesia    Shortness of breath    Poorly controlled diabetes mellitus ([facility])    Chronic diastolic congestive heart failure ([facility])    [place] (obstructive sleep apnea)    Pulmonary hypertension, unspecified ([facility])    Class 2 severe obesity with serious comorbidity and body mass index (BMI) of 36.0 to 36.9 in adult ([facility])    Edema    Closed supracondylar fracture of right humerus    Other chronic pain    Palliative care patient    Recurrent falls    Chronic renal failure    Difficulty in walking    ESRD (end stage renal disease) on dialysis ([facility])    Weakness    Other seizures ([facility])    Moderate persistent asthma without complication    COVID-19    Post PTCA    Falls frequently    Chest wall contusion, left, initial encounter    Headache, unspecified    Paranoid schizophrenia ([facility])    Compression fracture of spine ([facility])    Closed rib fracture    Depression    Chronic obstructive pulmonary disease ([facility])    Critical illness polyneuropathy ([facility])    Multiple closed fractures of ribs of right side    Nonrheumatic mitral valve regurgitation    Nonrheumatic tricuspid valve regurgitation    Need for extended care facility    Chronic pain of both shoulders    Gastrointestinal hemorrhage with melena    Hemodialysis-associated hypotension    Anginal chest pain at rest ([facility])    Chest pain    Unstable angina ([facility])    NSTEMI (non-ST elevated myocardial infarction) ([facility])     central pulmonary venous markings are seen.  There may be a small right pleural effusion      1. Left neck central venous catheter, tip in the expected location of the brachiocephalic/SVC junction. 2. Cardiomegaly with mild pulmonary venous congestion. 3. Possible small right pleural effusion.      FLUORO FOR SURGICAL PROCEDURES     Result Date: 7/30/2024  EXAMINATION: SPOT FLUOROSCOPIC IMAGES 7/30/2024 6:39 am TECHNIQUE: Fluoroscopy was provided by the radiology department for procedure. Radiologist was not present during examination. FLUOROSCOPY DOSE AND TYPE: Radiation Exposure Index: Kerma mGy, 38.6 COMPARISON: None HISTORY: ORDERING SYSTEM PROVIDED HISTORY: pain TECHNOLOGIST PROVIDED HISTORY: Reason for exam:->pain What reading provider will be dictating this exam?->CRC Intraprocedural imaging. FINDINGS: Spot intraoperative images are obtained demonstrating a left sided MediPort catheter.      Intraprocedural fluoroscopic spot images as above.  See separate procedure report for more information.      CT ABDOMEN PELVIS WO CONTRAST Additional Contrast? None     Result Date: 7/29/2024  EXAMINATION: CT OF THE ABDOMEN AND PELVIS WITHOUT CONTRAST 7/29/2024 10:39 am TECHNIQUE: CT of the abdomen and pelvis was performed without the administration of intravenous contrast. Multiplanar reformatted images are provided for review. Automated exposure control, iterative reconstruction, and/or weight based adjustment of the mA/kV was utilized to reduce the radiation dose to as low as reasonably achievable. COMPARISON: 07/01/2024 HISTORY: ORDERING SYSTEM PROVIDED HISTORY: R/O bleed TECHNOLOGIST PROVIDED HISTORY: Reason for exam:->R/O bleed Additional Contrast?->None What reading provider will be dictating this exam?->CRC FINDINGS: Lower Chest: Small bilateral pleural effusions.  There is cardiac chamber enlargement.  Interseptal thickening and ground-glass opacity with bronchial wall thickening to suggest mild interstitial

## 2024-08-08 NOTE — PLAN OF CARE
Problem: Discharge Planning  Goal: Discharge to home or other facility with appropriate resources  Outcome: Not Progressing  Flowsheets (Taken 8/7/2024 2015)  Discharge to home or other facility with appropriate resources: Identify barriers to discharge with patient and caregiver     Problem: Skin/Tissue Integrity  Goal: Absence of new skin breakdown  Description: 1.  Monitor for areas of redness and/or skin breakdown  2.  Assess vascular access sites hourly  3.  Every 4-6 hours minimum:  Change oxygen saturation probe site  4.  Every 4-6 hours:  If on nasal continuous positive airway pressure, respiratory therapy assess nares and determine need for appliance change or resting period.  Outcome: Progressing     Problem: ABCDS Injury Assessment  Goal: Absence of physical injury  Outcome: Not Progressing     Problem: Pain  Goal: Verbalizes/displays adequate comfort level or baseline comfort level  Outcome: Progressing  Flowsheets  Taken 8/8/2024 0000  Verbalizes/displays adequate comfort level or baseline comfort level:   Encourage patient to monitor pain and request assistance   Assess pain using appropriate pain scale   Administer analgesics based on type and severity of pain and evaluate response  Taken 8/7/2024 2000  Verbalizes/displays adequate comfort level or baseline comfort level: Assess pain using appropriate pain scale     Problem: Discharge Planning  Goal: Discharge to home or other facility with appropriate resources  Outcome: Not Progressing  Flowsheets (Taken 8/7/2024 2015)  Discharge to home or other facility with appropriate resources: Identify barriers to discharge with patient and caregiver     Problem: ABCDS Injury Assessment  Goal: Absence of physical injury  Outcome: Not Progressing

## 2024-08-08 NOTE — PROGRESS NOTES
Nephrology Progress Note    Assessment:  Unresponsive  Hyperkalemia  ESRDX  Hypotension  Hx CVAHx                                             Hepatitis-C  DM type-2      Plan: angel one dose   hospice family to see tomorrow than remove ventilator    Patient Active Problem List:     Coronary artery disease involving native coronary artery of native heart with angina pectoris (HCC)     Schizophrenia, paranoid, chronic     Metabolic syndrome     Vitamin B 12 deficiency     Cerebral microvascular disease     Mixed hyperlipidemia     Other hammer toe (acquired)     Vitamin D insufficiency     Incontinence of urine     Diabetic nephropathy with proteinuria (HCC)     Essential (primary) hypertension     History of type C viral hepatitis     Urinary incontinence due to cognitive impairment     History of seizures     Stented coronary artery-plan is to stay on Plavix indefinately per Dr Walker     Diabetes mellitus ([facility])     Controlled type 2 diabetes mellitus with diabetic neuropathy, with long-term current use of insulin ([facility])     Hemiparesis, left ([facility])     Angina, class II ([facility])     Pain, unspecified     Tardive dyskinesia     Shortness of breath     Poorly controlled diabetes mellitus ([facility])     Chronic diastolic congestive heart failure (HCC)     [place] (obstructive sleep apnea)     Pulmonary hypertension, unspecified ([facility])     Class 2 severe obesity with serious comorbidity and body mass index (BMI) of 36.0 to 36.9 in adult (HCC)     Edema     Closed supracondylar fracture of right humerus     Other chronic pain     Palliative care patient     Recurrent falls     Chronic renal failure     Difficulty in walking     ESRD (end stage renal disease) on dialysis (HCC)     Weakness     Other seizures ([facility])     Moderate persistent asthma without complication     COVID-19     Post PTCA     Falls frequently     Chest wall contusion, left, initial encounter     Headache, unspecified     Paranoid schizophrenia (HCC)     Compression  splitter   Does not apply Once     Continuous Infusions:   VASOpressin 20 Units in sodium chloride 0.9 % 100 mL infusion Stopped (08/05/24 0937)    norepinephrine 2 mcg/min (08/08/24 0606)    sodium chloride      sodium chloride      sodium chloride      dextrose         CBC:   Recent Labs     08/07/24  0543 08/08/24 0524   WBC 24.0* 15.8*   HGB 8.9* 7.8*   * 69*     CMP:    Recent Labs     08/06/24  0541 08/06/24  1003 08/06/24  1420 08/07/24  0549 08/08/24 0524     --   --  140 139   K 4.8  --   --  5.1* 5.6*   CL 99  --   --  103 104   CO2 18*  --   --  20 20   BUN 38*  --   --  48* 57*   CREATININE 3.13*   < > 3.2* 3.63* 3.96*   GLUCOSE 127*  --   --  134* 130*   CALCIUM 9.6  --   --  10.3* 9.6   LABGLOM 15.7*   < > 15* 13.2* 11.9*    < > = values in this interval not displayed.     Troponin: No results for input(s): \"TROPONINI\" in the last 72 hours.  BNP: No results for input(s): \"BNP\" in the last 72 hours.  INR: No results for input(s): \"INR\" in the last 72 hours.  Lipids: No results for input(s): \"CHOL\", \"LDLDIRECT\", \"TRIG\", \"HDL\", \"AMYLASE\", \"LIPASE\" in the last 72 hours.  Liver: No results for input(s): \"AST\", \"ALT\", \"ALKPHOS\", \"LABALBU\", \"BILITOT\" in the last 72 hours.    Invalid input(s): \"PROT\", \"BILDIR\"  Iron:  No results for input(s): \"FERRITIN\" in the last 72 hours.    Invalid input(s): \"IRONS\", \"LABIRONS\"  Urinalysis: No results for input(s): \"UA\" in the last 72 hours.    Objective:  Vitals: BP (!) 130/36   Pulse 68   Temp (!) 96.1 °F (35.6 °C)   Resp 20   Ht 1.727 m (5' 8\")   Wt 96.7 kg (213 lb 3 oz)   LMP  (LMP Unknown)   SpO2 98%   BMI 32.41 kg/m²    Wt Readings from Last 3 Encounters:   08/07/24 96.7 kg (213 lb 3 oz)   07/24/24 98.4 kg (217 lb)   07/15/24 98.4 kg (217 lb)      24HR INTAKE/OUTPUT:    Intake/Output Summary (Last 24 hours) at 8/8/2024 0824  Last data filed at 8/8/2024 0606  Gross per 24 hour   Intake 979.24 ml   Output --   Net 979.24 ml       General: alert,

## 2024-08-08 NOTE — PROGRESS NOTES
Pt remains on the ventilator, no response to tactile or verbal stimuli. No gag noted with oral care. Pt repositioned .

## 2024-08-08 NOTE — DISCHARGE SUMMARY
Hospital Medicine Discharge Summary    Michelle Le  :  1960  MRN:  [id number]    Admit date:  2024  Discharge date:  2024    Admitting Physician:  No admitting provider for patient encounter.  Primary Care Physician:  Adilene Terry MD      Chief Complaint   Patient presents with    Hypotension     Picked at HD site and started bleeding, hypotensive when EMS arrived     Hospital Course:     64 y.o. female who presents with a hx of dm2, gerd, htn, hld, gerd, copd, who presents from home after pulling bandage off dialysis site and had significant bleeding from the site. She was found to be in s hock and ultimately had to go to ICU after being intubated and requiring pressor support. After complicated course including diagnosis of hypoxic ischemic encephalopathy, family ultimately decided to dc to hospice. Management while inpatient as described below:       PEA cardiac arrest s/p ROSC  -  while having fistulogram done by IR on   - ROSC was obtained after 1 round of CPR and Epinephrine x1  - requiring intubation and mechanical ventilation  - completed targeted temperature  protocol   - TTE showed  LVEF of 55 - 60%, DD, mild MR/AR/TR, RVSP of 42 mmHg, severely dilated LA   - remains hypotensive requiring Norepinephrine infusion, Solucortef, Midodrine  - started on Zosyn/Vancomycin empirically  - switched from Zosyn to Meropenem due to worsening leukocytosis and rising procalcitonin  - followed by critical care and cardiology service  -Family opting for hospice care     Acute encephalopathy  - remains unresponsive off sedation per nursing staff,   - CT head  unrevealing  -EEG showing signs of hypoxic ischemic encephalopathy  - hospice      Acute blood loss anemia  - due to recurrent bleeding at dialysis catheter site  - studies showed inflammatory pattern  - Hb improved after transfusion of PRBCs     Ascites   - requiring large volume paracentesis x 2 in  and   - no hx of liver  VI;ONE TOUCH ULTRA TEST VI  1 each by In Vitro route daily As needed.     * FREESTYLE LITE strip  Generic drug: blood glucose test strips  Use three time daily to test BS E11.65     * blood glucose test strips  1 strip by Other route 4 times daily (before meals and nightly) Pt test 4x daily Dx E11.65.  May substitute for generic or insurance covered product     * hydrocortisone 5 MG tablet  Commonly known as: CORTEF     * hydrocortisone 10 MG tablet  Commonly known as: Cortef  Take 2 tablets by mouth 2 times daily     hydrOXYzine HCl 25 MG tablet  Commonly known as: ATARAX     insulin lispro (1 Unit Dial) 100 UNIT/ML Sopn  Commonly known as: HumaLOG KwikPen  inject 3 times daily before meals- 120-140 2 units, 141-160 3 units, 161-180 4 units, 181-200 5 units, 201-220 6 units, 221-240 7 units, 241-260 8 units, 261-280 9 units, 281-300 10 units, 301 or higher 11 units.     Lantus SoloStar 100 UNIT/ML injection pen  Generic drug: insulin glargine  INJECT 35 UNITS SUBCUTANEOUSLY AT BEDTIME     melatonin 3 MG Tabs tablet     midodrine 10 MG tablet  Commonly known as: PROAMATINE  Take 2 tablets by mouth 3 times daily (with meals)     mupirocin 2 % ointment  Commonly known as: BACTROBAN     pantoprazole 20 MG tablet  Commonly known as: PROTONIX     sertraline 50 MG tablet  Commonly known as: ZOLOFT  Ask about: Which instructions should I use?     Trelegy Ellipta 100-62.5-25 MCG/ACT Aepb inhaler  Generic drug: fluticasone-umeclidin-vilant  Inhale 1 puff into the lungs daily     Vitamin D3 50 MCG (2000 UT) Caps           * This list has 7 medication(s) that are the same as other medications prescribed for you. Read the directions carefully, and ask your doctor or other care provider to review them with you.                      Total time taken for discharging this patient: 40 minutes. Greater than 70% of time was spent focused exclusively on this patient. Time was taken to review chart, discuss plans with consultants,

## 2024-08-08 NOTE — PROCEDURES
[facility]                   [address] [place] [address]                          ELECTROENCEPHALOGRAM      PATIENT NAME: VELASQUEZ GARBER            : 1960  MED REC NO: [id number]                        ROOM: [facility]  ACCOUNT NO: [id number]                       ADMIT DATE: 2024  PROVIDER: Geronimo Rosas MD       DATE OF :  2024    MEDICATIONS:  Solu-Cortef, meropenem, heparin, Levophed, vancomycin, Protonix.  No sedation on board.    DESCRIPTION:  The background of this EEG *** slowing.  There appears to be suggestion of some initial suppressed patterns with rhythms seen.  It then eventually turns into normal alpha rhythms seen in between.  A few occasional sharp waves are notable, which are bisynchronous.  The record otherwise remains symmetrical without any other asymmetries.  As the record proceeds, definite underlying alpha rhythms are seen in a patient who is comatose.  Photic stimulation does not reveal any photic responses.  EKG was monitored.  The patient is tachycardic.    IMPRESSION:  This is an abnormal EEG recording by the virtue of suppressed patterns seen throughout with alpha rhythm seen in a comatose patient consistent with alpha coma.  No definite seizures noted.  Clinical correlation recommended.          GERONIMO ROSAS MD      D:  2024 16:34:46     T:  2024 17:28:20     MENA/JEREMIE  Job #:  [id number]     Doc#:  [id number]

## 2024-10-31 NOTE — PROGRESS NOTES
[facility] Occupational Therapy      Date: 2022  Patient Name: Contreras Shelley        MRN: [id number]  Account: [de-identified]   : 1960  (57 y.o.)  Room: [place] [address]    Chart reviewed, attempted OT at [phone] for OT ASAP treatment. Patient not seen 2° to:    Pt. off floor for test/procedure- dialysis, then NOELLE    Spoke to FLAQUITO Cochran RN aware. Will attempt again when able.     Electronically signed by MARY Wilson on 2022 at 1:28 PM #subacute R temporal hematoma

## 2025-01-17 NOTE — OR NURSING
Copied from CRM #[id number]. Topic: MW Medication/Rx - MW Patient Calling for RX Needs  >> Jan 17, 2025  2:55 PM Francisco ANGEL wrote:  víctor jose called with Question about medication during working hrs.   Selected 'Wrap Up CRM' and created new Telephone Encounter after clicking 'Convert to Clinical Call'. Selected reason for call 'Medication Issue'. Sent Med template and routed as routine priority per Care Site Dept KB page for Med Refill Working Hrs support to appropriate clinical pool. Readback full message.    -- DO NOT REPLY / DO NOT REPLY ALL --  -- This inbox is not monitored. If this was sent to the wrong provider or department, reroute message to P ECO Reroute pool. --  -- Message is from Engagement Center Operations (ECO) --      Message Type:  Medication Question or Concern      Medication Question:  Patient says she was previously taking escitalopram (LEXAPRO) 20 MG tablet with buPROPion (WELLBUTRIN) 75 MG tablet but the pharmacy told her the doctor canceled escitalopram (LEXAPRO) 20 MG tablet.  The patient wants to know if she should continue taking escitalopram (LEXAPRO) 20 MG tablet with the buPROPion (WELLBUTRIN) 75 MG tablet because she is completely out of escitalopram (LEXAPRO) 20 MG tablet at this time and she's not having any side affects and she's happy with both medications.  Patient asked for a detailed  voice message left on her phone if she does not answer.  Please advise.     Preferred pharmacy verified and selected.   [facility] [address] [address] [facility]    Patient has been advised the message will be addressed within 2-3 business days.               NO SEDATION    1400 Pt arrived to Specials room 6 via . Pt moved via electronic lift with 2 RN assist. Pt denies pain at this time. Consent obtained for ultrasound guided paracentesis. Pt confirms she has been off eliquis x 2 days. Allergies reviewed.     1433 U/S image obtained. Timeout completed.  Area cleansed with large tinted chloraprep. After 3 minute dry time, draped with fenestrated drape and sterile towels by Dr. White.     1434 Using U/S guidance, Dr. White numbed site with 2% lidocaine, see eMar.     1435 Using U/S guidance, access obtained with [id number] centesis 5F catheter with return of fluid that appears clear yellow. Catheter connected to tubing and Vianey machine with suction at 200 mmHG and draining well. Pt tolerating well. Pt hypotensive, Dr. White aware. Pt states that she is not symptomatic with her blood pressure. She also reports taking her midodrine today.     1500 Iv established, albumin infusing without complication.     1530 Drainage complete. Total 7600 ml removed. Centesis catheter removed and digital pressure held to site for 2 minutes.  Pt blood pressure improved.  LLQ site soft, no drainage, large bandaid applied. VSS.     1539 Call to patients daughter to notify pt ready for discharge. Discharge instructions reviewed with patient. Pts IV removed and bandage applied.      1602 Pt assisted back into WC via lift. Pt tolerated well. Pt taken to discharge exit, pts daughter to drive her home.

## 2025-01-23 NOTE — TELEPHONE ENCOUNTER
ENDOCRINE MEDICAL ASSISTANT ROOMING NOTE  Is the patient here for diabetes mellitus: YES     Rooming documentation of social history includes tobacco screening only.  Medication list reviewed and updated.    PCP: Elvia Victor MD    No Diabetes Eye Exam Date Documented.  If no diabetes eye exam documented, when was last eye exam reported by patient: 2022    Date of Last Foot Exam: 3/20/2023  Patient declined to remove shoes for a diabetic foot exam today    Patient checked blood glucose at home with a reading of 144 mg/dL. This was a Fasting blood glucose.     Did the patient bring a glucose monitoring device to today's visit: NO   Device used at home: Blood glucose meter (fingerstick)   Is the patient on Insulin: Yes  Does the patient currently use an insulin pump or smart pen: InPen    Patient would like communication of their results via: [employer]    Refills Needed: No    Per LOV, patient is not taking Imdur anymore. Attempted to call patient to clarify. No answer. No refills given.     Requesting medication refill. Please approve or deny this request.    Rx requested:  Requested Prescriptions     Pending Prescriptions Disp Refills    isosorbide mononitrate (IMDUR) 30 MG extended release tablet [Pharmacy Med Name: ISOSORBIDE [place] ER 30MG *MONO 30 Tablet] 120 tablet 10     Sig: TAKE 4 TABLETS BY MOUTH DAILY         Last Office Visit:   12/18/2023      Next Visit Date:  Future Appointments   Date Time Provider Department Center   8/8/2024  1:00 PM Autumn Rojo MD [facility]

## 2025-02-12 NOTE — ED NOTES
Pt states 7/10 pain in chest and back. Pt medicated for pain.       Sanjeev Ramey  06/22/22 [address]  06/22/22 1013 No

## 2025-02-18 NOTE — PROGRESS NOTES
Can we up her dose?  COLONOSCOPY  1/9/2014    Dr. Martina Corona      x1 Dr. Kerry yNe, TOTAL ABDOMINAL      one ovary intact, Dr Juan Antonio Jeong, menorrhagia    TOE AMPUTATION Right     TOTAL KNEE ARTHROPLASTY  05/19/16    Dr Kaity Ugalde     Precautions:  Falls       Evaluation and Pt. rights have been reviewed: [x]Yes   [] No   If no why not:   Falls safety interventions in place  [x]Yes   [] No    Comments:     Subjective: \"I feel okay\"    Prior living arrangement:      Support contact: Daughter is her aide and assists with ADLs    Pt lives: [x] Alone   [] With spouse   [] Other   Comment:    Home: [x] Single level   []  Two level   []  Split level     []  Apartment:     Entrance:  Stairs: 0 Hand rails 0,    Inside: Stairs: 0 Hand rails: 0  Bathroom: [] Bath tub   [x] Tub/Shower combo   []  Shower stall    Location: 1st floor    DME: [x] W/W   [] Carrie Ly   [] Rollator   []  W/C   [] Grab Bars   [x] Shower Chair   [] [facility]  [] Dressing  AE  [] Other:      Previous Functional Status:  Assistance with LE dressing, bathing, toilets independently.     Pain:   Start of session: 0/10  Description: NA  Location: NA  End of session: 0/10  Action: [x] No Action Necessary    [] Patient reports pain at acceptable level for treatment  [] Nursing notified    [] Other      Objective:  Observation:  Pt. alert and attentive    Orientation: Oriented to  [x] Person   [x] Place  [x]Time    Vision:   [x]  [facility]   [] Impaired  Comments: Reading glasses     Hearing:  [x] [facility]   [] Impaired  Comments:    Sensation:   [x] WFL   []  Impaired   Comments:      Cognition:   [x] WFL   [] Impaired  Comments:    Communication:   [x] WFL   [] Impaired  Comments: Raspy voice, no dentures, no teeth    Range of Motion:  R UE AROM/PROM: [x]  WFL [] Impaired  Comments:   L UE AROM/PROM:  [x]  WFL [] Impaired  Comments:     Strength:   R UE Strength: []1    [] 2   [] 2+   [x] 3   [] 3+   [] 4   [] 4+  [] 5  Comments:   L UE Strength: []1    [] 2   [] 2+   [x] 3   [] 3+  [] 4   [] 4+   [] 5  Comments:     Quality of Movement:  [] Good   [x] Fair   [] Poor     Coordination:  Gross motor: [] WFL   [x] Impaired   Fine motor: [] WFL   [x] Impaired   Increased time for full ROM, pt. reports difficulty with fine motor tasks at home. Functional Mobility:  Toilet Transfers:  SBA (per pt report)  Bed Transfer:  SBA  Sit to stand: SBA  Bed to Chair: NT; pt. returned to bed. Seated Balance:      Static: [x] Good  [] Fair   [] Poor   Dynamic: []  Good  [x] Fair   [] Poor     Standing Balance:     Static: [] Good   [x] Fair  [] Poor   Dynamic: [] Good   [x] Fair   [] Poor     Functional Endurance: [] Good  [x] Fair  [] Poor     ADLs  Feeding:   Independent  UE Dressing:  Setup  LB Dressing:  Max A  Bathing: Mod A  Toileting: SBA  Grooming: Setup    Treatment Plan will consist of:   [x] ADL Training   [x] Strengthening   [x] Endurance   [] Transfer Training   [x]  DME ed      [x] HEP  [] Manual Therapy   [] AROM/PROM    [x] Coordination   [] Cognitive Training   []Safety training   [] Other :    Goals:   Patient will:   [x]  Improve functional endurance to tolerate/complete 60 mins of ADL's   [x]  Be Setup in UB ADLs    [x]  Be Min A in LB ADLs   [x]  Be SBA in ADL transfers without LOB   [x]  Be Mod I in toileting tasks   [x]  Improve B hand fine motor coordination to [facility] in order to manage clothing fasteners/self-care containers in a timely lori   [x]  Improve B UE strength and endurance to 4-/5 in order to participate in self-care activities as projected. [x]  Access appropriate D/C site with as few architectural barriers as possible. Patient Goal: \"To be more independent with my dressing\"  Discussed and agreed upon: [] Yes   [] No         Comments:     Assessment/Discharge Disposition:  Assessment: Pt. demonstrates decreased balance, endurance and strengthening which impacts her ability to safely perform ADL tasks.    Performance deficits /

## 2025-07-14 NOTE — CARE COORDINATION
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   Discharge Date: 20 RARS: Readmission Risk Score: [facility] Update    Care Transitions Interventions     DME Assistance:  Completed   Post Acute Facility:  [facility] Update Statement Selected

## (undated) PROCEDURE — 0W9G3ZZ DRAINAGE OF PERITONEAL CAVITY, PERCUTANEOUS APPROACH: ICD-10-PCS

## (undated) PROCEDURE — 5A1D70Z PERFORMANCE OF URINARY FILTRATION, INTERMITTENT, LESS THAN 6 HOURS PER DAY: ICD-10-PCS

## (undated) PROCEDURE — 3E03317 INTRODUCTION OF OTHER THROMBOLYTIC INTO PERIPHERAL VEIN, PERCUTANEOUS APPROACH: ICD-10-PCS

## (undated) PROCEDURE — 0BH17EZ INSERTION OF ENDOTRACHEAL AIRWAY INTO TRACHEA, VIA NATURAL OR ARTIFICIAL OPENING: ICD-10-PCS

## (undated) DEVICE — C-ARM: Brand: UNBRANDED

## (undated) DEVICE — SPONGE GZ W4XL4IN RAYON POLY CVR W/NONWOVEN FAB STRL 2/PK

## (undated) DEVICE — 3M™ PICC/ CVC SECUREMENT DEVICE + TEGADERM™ CHG DRESSING 1877-2100: Brand: TEGADERM™

## (undated) DEVICE — GLOVE ORANGE PI 7 1/2   MSG9075

## (undated) DEVICE — PACK,LAPAROTOMY,NO GOWNS: Brand: MEDLINE

## (undated) DEVICE — 16F (5.4MM ID) X 14CM16F (5.4MM VALVED PEELABLE INTRODUCERVALVED: Brand: GENERATION II VALVED TEARAWAY INTRODUCERGENERATION II VALVED TEARAWAY INTRODUCER

## (undated) DEVICE — RADIFOCUS GLIDEWIRE: Brand: GLIDEWIRE

## (undated) DEVICE — PROVE COVER: Brand: UNBRANDED

## (undated) DEVICE — GLOVE ORANGE PI 8   MSG9080

## (undated) DEVICE — GOWN,AURORA,NONREINFORCED,LARGE: Brand: MEDLINE

## (undated) DEVICE — STERILE PATIENT PROTECTIVE PAD FOR IMP® KNEE POSITIONERS & COHESIVE WRAP (10 / CASE): Brand: DE MAYO KNEE POSITIONER®

## (undated) DEVICE — CHLORAPREP 26ML ORANGE

## (undated) DEVICE — GOWN,AURORA,NONRNF,XL,30/CS: Brand: MEDLINE

## (undated) DEVICE — CONMED SCOPE SAVER BITE BLOCK, 20X27 MM: Brand: SCOPE SAVER

## (undated) DEVICE — PACK,EENT,TURBAN DRAPE,PK II: Brand: MEDLINE

## (undated) DEVICE — BLADE REPROCESS SAW PATELLA REAMER 29MM

## (undated) DEVICE — ELECTRODE PT RET AD L9FT HI MOIST COND ADH HYDRGEL CORDED

## (undated) DEVICE — SINGLE PORT MANIFOLD: Brand: NEPTUNE 2

## (undated) DEVICE — SUTURE MONOCRYL SZ 4-0 L27IN ABSRB UD L19MM PS-2 1/2 CIR PRIM Y426H

## (undated) DEVICE — HYPODERMIC SAFETY NEEDLE: Brand: MAGELLAN

## (undated) DEVICE — TOWEL,OR,DSP,ST,BLUE,STD,4/PK,20PK/CS: Brand: MEDLINE

## (undated) DEVICE — SPLINT KNEE UNIV FOR LESS THAN 36IN L20IN FOAM LAM E CNTCT

## (undated) DEVICE — SUTURE NONABSORBABLE MONOFILAMENT 3-0 PS-1 18 IN BLK ETHILON 1663H

## (undated) DEVICE — LABEL MED MINI W/ MARKER

## (undated) DEVICE — TIBURON SPLIT SHEET: Brand: CONVERTORS

## (undated) DEVICE — COUNTER NDL 40 COUNT HLD 70 FOAM BLK ADH W/ MAG

## (undated) DEVICE — Device: Brand: ENDO SMARTCAP

## (undated) DEVICE — PACK PROCEDURE SURG TOTAL KNEE PACK

## (undated) DEVICE — APPLICATOR MEDICATED 10.5 CC SOLUTION HI LT ORNG CHLORAPREP

## (undated) DEVICE — SYRINGE MED 10ML LUERLOCK TIP W/O SFTY DISP

## (undated) DEVICE — BRUSH ENDO CLN L90.5IN SHTH DIA1.7MM BRIST DIA5-7MM 2-6MM

## (undated) DEVICE — POWERHICKMAN 9.5F DUAL-LUMEN POLYURETHANE CATHETER WITH SURECUFF INGROWTH CUFF (5CM) MICROINTRODUCER KIT: Brand: POWERHICKMAN

## (undated) DEVICE — BANDAGE ADH W0.75XL3IN UNIV WVN FAB NAT GEN USE STRP N ADH

## (undated) DEVICE — TUBE SET 96 MM 64 MM H2O PERISTALTIC STD AUX CHANNEL

## (undated) DEVICE — BLADE SAW W125XL70MM THK064MM CUT THK094MM REPL RECIP DBL

## (undated) DEVICE — TIBURON TOP SHEET: Brand: CONVERTORS

## (undated) DEVICE — UNDERCAST PADDING: Brand: DEROYAL

## (undated) DEVICE — SUTURE VCRL SZ 1 L36IN ABSRB UD L36MM CT-1 1/2 CIR J947H

## (undated) DEVICE — MARKER SURG SKIN GENTIAN VLT REG TIP W/ 6IN RUL DYNJSM01

## (undated) DEVICE — T4 HOOD

## (undated) DEVICE — RESTRAINT EXTREMITY LIMB HOLDER SFT FOAM SINGLE STRP DISP

## (undated) DEVICE — GOWN,SIRUS,NONRNF,SETINSLV,XL,20/CS: Brand: MEDLINE

## (undated) DEVICE — GEL US 20GM NONIRRITATING OVERWRAPPED FILE PCH TRNSMIT

## (undated) DEVICE — POWERHICKMAN 9.5F DUAL-LUMEN POLYURETHANE CATHETER WITH SURECUFF INGROWTH CUFF (5CM) INTERMEDIATE KIT: Brand: POWERHICKMAN

## (undated) DEVICE — NEEDLE HYPO 25GA L1.5IN BLU POLYPR HUB S STL REG BVL STR

## (undated) DEVICE — TUBING, SUCTION, 1/4" X 10', STRAIGHT: Brand: MEDLINE

## (undated) DEVICE — ULTRAMIX BOWL (NEW) KNEE

## (undated) DEVICE — SYRINGE MED 50ML LUERLOCK TIP

## (undated) DEVICE — USAGE FEE F/OSTEOTOME

## (undated) DEVICE — SIPS DUAL 2 MINUTE TIP

## (undated) DEVICE — COVER LT HNDL BLU PLAS

## (undated) DEVICE — SUTURE MCRYL SZ 4-0 L27IN ABSRB UD L19MM PS-2 1/2 CIR PRIM Y426H

## (undated) DEVICE — SUTURE MCRYL + SZ 3-0 L36IN ABSRB UD CT-1 L36MM 1/2 CIR MCP944H

## (undated) DEVICE — MINOR ENT: Brand: MEDLINE INDUSTRIES, INC.

## (undated) DEVICE — ENDO CARRY-ON PROCEDURE KIT: Brand: ENDO CARRY-ON PROCEDURE KIT

## (undated) DEVICE — Device: Brand: STABLECUT®

## (undated) DEVICE — Z DISCONTINUED PER MEDLINE USE 2741944 DRESSING AQUACEL 12 IN SURG W9XL30CM SIL CVR WTRPRF VIR BACT BARR ANTIMIC

## (undated) DEVICE — ELASTIC BANDAGE: Brand: DEROYAL

## (undated) DEVICE — BLADE RMR L32MM PAT W/ PILOT H

## (undated) DEVICE — 3M™ STERI-DRAPE™ U-DRAPE 1015: Brand: STERI-DRAPE™